# Patient Record
Sex: FEMALE | Race: WHITE | NOT HISPANIC OR LATINO | Employment: OTHER | URBAN - NONMETROPOLITAN AREA
[De-identification: names, ages, dates, MRNs, and addresses within clinical notes are randomized per-mention and may not be internally consistent; named-entity substitution may affect disease eponyms.]

---

## 2017-01-16 ENCOUNTER — APPOINTMENT (OUTPATIENT)
Dept: GENERAL RADIOLOGY | Facility: HOSPITAL | Age: 47
End: 2017-01-16
Attending: FAMILY MEDICINE

## 2017-01-16 PROCEDURE — 71020 HC CHEST PA AND LATERAL: CPT

## 2017-05-17 ENCOUNTER — HOSPITAL ENCOUNTER (INPATIENT)
Age: 47
LOS: 10 days | Discharge: HOME | DRG: 682 | End: 2017-05-27
Attending: EMERGENCY MEDICINE | Admitting: HOSPITALIST
Payer: MEDICARE

## 2017-05-17 ENCOUNTER — APPOINTMENT (OUTPATIENT)
Dept: GENERAL RADIOLOGY | Age: 47
DRG: 682 | End: 2017-05-17
Payer: MEDICARE

## 2017-05-17 DIAGNOSIS — E16.2 HYPOGLYCEMIA: ICD-10-CM

## 2017-05-17 DIAGNOSIS — T68.XXXA HYPOTHERMIA, INITIAL ENCOUNTER: ICD-10-CM

## 2017-05-17 DIAGNOSIS — N18.6 ESRD (END STAGE RENAL DISEASE) (HCC): Primary | ICD-10-CM

## 2017-05-17 PROBLEM — N17.0 ACUTE RENAL FAILURE WITH TUBULAR NECROSIS (HCC): Status: ACTIVE | Noted: 2017-05-17

## 2017-05-17 LAB
ALBUMIN SERPL-MCNC: 3.9 G/DL (ref 3.5–5.2)
ALP BLD-CCNC: 99 U/L (ref 35–104)
ALT SERPL-CCNC: 27 U/L (ref 5–33)
ANION GAP SERPL CALCULATED.3IONS-SCNC: 17 MMOL/L (ref 7–19)
AST SERPL-CCNC: 36 U/L (ref 5–32)
BACTERIA: NEGATIVE /HPF
BASOPHILS ABSOLUTE: 0.1 K/UL (ref 0–0.2)
BASOPHILS RELATIVE PERCENT: 1.6 % (ref 0–1)
BILIRUB SERPL-MCNC: <0.2 MG/DL (ref 0.2–1.2)
BILIRUBIN URINE: NEGATIVE
BLOOD, URINE: NEGATIVE
BUN BLDV-MCNC: 75 MG/DL (ref 6–20)
CALCIUM SERPL-MCNC: 8.8 MG/DL (ref 8.6–10)
CHLORIDE BLD-SCNC: 103 MMOL/L (ref 98–111)
CHP ED QC CHECK: YES
CLARITY: CLEAR
CO2: 19 MMOL/L (ref 22–29)
COLOR: YELLOW
CREAT SERPL-MCNC: 8.5 MG/DL (ref 0.5–0.9)
EOSINOPHILS ABSOLUTE: 0.1 K/UL (ref 0–0.6)
EOSINOPHILS RELATIVE PERCENT: 3.1 % (ref 0–5)
EPITHELIAL CELLS, UA: 1 /HPF (ref 0–5)
GFR NON-AFRICAN AMERICAN: 5
GLUCOSE BLD-MCNC: 109 MG/DL (ref 70–99)
GLUCOSE BLD-MCNC: 115 MG/DL (ref 74–109)
GLUCOSE BLD-MCNC: 117 MG/DL (ref 70–99)
GLUCOSE BLD-MCNC: 136 MG/DL (ref 70–99)
GLUCOSE BLD-MCNC: 152 MG/DL (ref 70–99)
GLUCOSE BLD-MCNC: 59 MG/DL
GLUCOSE BLD-MCNC: 59 MG/DL (ref 70–99)
GLUCOSE URINE: NEGATIVE MG/DL
HCT VFR BLD CALC: 30.1 % (ref 37–47)
HEMOGLOBIN: 9.3 G/DL (ref 12–16)
HYALINE CASTS: 1 /HPF (ref 0–8)
KETONES, URINE: NEGATIVE MG/DL
LEUKOCYTE ESTERASE, URINE: NEGATIVE
LYMPHOCYTES ABSOLUTE: 1.1 K/UL (ref 1.1–4.5)
LYMPHOCYTES RELATIVE PERCENT: 24.5 % (ref 20–40)
MCH RBC QN AUTO: 28.5 PG (ref 27–31)
MCHC RBC AUTO-ENTMCNC: 30.9 G/DL (ref 33–37)
MCV RBC AUTO: 92.3 FL (ref 81–99)
MONOCYTES ABSOLUTE: 0.3 K/UL (ref 0–0.9)
MONOCYTES RELATIVE PERCENT: 6.1 % (ref 0–10)
NEUTROPHILS ABSOLUTE: 2.9 K/UL (ref 1.5–7.5)
NEUTROPHILS RELATIVE PERCENT: 64.5 % (ref 50–65)
NITRITE, URINE: NEGATIVE
PDW BLD-RTO: 13.6 % (ref 11.5–14.5)
PERFORMED ON: ABNORMAL
PERFORMED ON: NORMAL
PH UA: 5.5
PLATELET # BLD: 171 K/UL (ref 130–400)
PMV BLD AUTO: 9.4 FL (ref 7.4–10.4)
POC TROPONIN I: 0.01 NG/ML (ref 0–0.08)
POTASSIUM SERPL-SCNC: 5.2 MMOL/L (ref 3.5–5)
PROTEIN UA: 300 MG/DL
RBC # BLD: 3.26 M/UL (ref 4.2–5.4)
RBC UA: 6 /HPF (ref 0–4)
SODIUM BLD-SCNC: 139 MMOL/L (ref 136–145)
SPECIFIC GRAVITY UA: 1.01
TOTAL PROTEIN: 6.9 G/DL (ref 6.6–8.7)
UROBILINOGEN, URINE: 0.2 E.U./DL
WBC # BLD: 4.5 K/UL (ref 4.8–10.8)
WBC UA: 3 /HPF (ref 0–5)

## 2017-05-17 PROCEDURE — 2580000003 HC RX 258: Performed by: HOSPITALIST

## 2017-05-17 PROCEDURE — 2500000003 HC RX 250 WO HCPCS: Performed by: HOSPITALIST

## 2017-05-17 PROCEDURE — 99285 EMERGENCY DEPT VISIT HI MDM: CPT

## 2017-05-17 PROCEDURE — 6370000000 HC RX 637 (ALT 250 FOR IP): Performed by: EMERGENCY MEDICINE

## 2017-05-17 PROCEDURE — 2000000000 HC ICU R&B

## 2017-05-17 PROCEDURE — 85025 COMPLETE CBC W/AUTO DIFF WBC: CPT

## 2017-05-17 PROCEDURE — 36415 COLL VENOUS BLD VENIPUNCTURE: CPT

## 2017-05-17 PROCEDURE — 81001 URINALYSIS AUTO W/SCOPE: CPT

## 2017-05-17 PROCEDURE — 6360000002 HC RX W HCPCS: Performed by: HOSPITALIST

## 2017-05-17 PROCEDURE — 2700000000 HC OXYGEN THERAPY PER DAY

## 2017-05-17 PROCEDURE — 82948 REAGENT STRIP/BLOOD GLUCOSE: CPT

## 2017-05-17 PROCEDURE — 80053 COMPREHEN METABOLIC PANEL: CPT

## 2017-05-17 PROCEDURE — 99223 1ST HOSP IP/OBS HIGH 75: CPT | Performed by: HOSPITALIST

## 2017-05-17 PROCEDURE — 99284 EMERGENCY DEPT VISIT MOD MDM: CPT | Performed by: EMERGENCY MEDICINE

## 2017-05-17 PROCEDURE — 2580000003 HC RX 258: Performed by: EMERGENCY MEDICINE

## 2017-05-17 PROCEDURE — 93005 ELECTROCARDIOGRAM TRACING: CPT

## 2017-05-17 PROCEDURE — 6370000000 HC RX 637 (ALT 250 FOR IP): Performed by: HOSPITALIST

## 2017-05-17 PROCEDURE — 84484 ASSAY OF TROPONIN QUANT: CPT

## 2017-05-17 PROCEDURE — 71010 XR CHEST PORTABLE: CPT

## 2017-05-17 PROCEDURE — 87070 CULTURE OTHR SPECIMN AEROBIC: CPT

## 2017-05-17 RX ORDER — DEXTROSE MONOHYDRATE 25 G/50ML
25 INJECTION, SOLUTION INTRAVENOUS ONCE
Status: COMPLETED | OUTPATIENT
Start: 2017-05-17 | End: 2017-05-17

## 2017-05-17 RX ORDER — NIFEDIPINE 60 MG/1
60 TABLET, EXTENDED RELEASE ORAL DAILY
Status: DISCONTINUED | OUTPATIENT
Start: 2017-05-17 | End: 2017-05-21

## 2017-05-17 RX ORDER — ATENOLOL 50 MG/1
100 TABLET ORAL 2 TIMES DAILY
Status: DISCONTINUED | OUTPATIENT
Start: 2017-05-17 | End: 2017-05-27 | Stop reason: HOSPADM

## 2017-05-17 RX ORDER — LOSARTAN POTASSIUM 50 MG/1
50 TABLET ORAL 2 TIMES DAILY
Status: DISCONTINUED | OUTPATIENT
Start: 2017-05-17 | End: 2017-05-19

## 2017-05-17 RX ORDER — ENALAPRILAT 2.5 MG/2ML
1.25 INJECTION INTRAVENOUS EVERY 4 HOURS PRN
Status: DISCONTINUED | OUTPATIENT
Start: 2017-05-17 | End: 2017-05-19

## 2017-05-17 RX ORDER — ONDANSETRON 2 MG/ML
4 INJECTION INTRAMUSCULAR; INTRAVENOUS EVERY 4 HOURS PRN
Status: DISCONTINUED | OUTPATIENT
Start: 2017-05-17 | End: 2017-05-27 | Stop reason: HOSPADM

## 2017-05-17 RX ORDER — SIMVASTATIN 40 MG
40 TABLET ORAL DAILY
Status: DISCONTINUED | OUTPATIENT
Start: 2017-05-17 | End: 2017-05-27 | Stop reason: HOSPADM

## 2017-05-17 RX ORDER — LEVOTHYROXINE SODIUM 137 UG/1
137 TABLET ORAL DAILY
Status: DISCONTINUED | OUTPATIENT
Start: 2017-05-17 | End: 2017-05-27 | Stop reason: HOSPADM

## 2017-05-17 RX ORDER — DULOXETIN HYDROCHLORIDE 60 MG/1
60 CAPSULE, DELAYED RELEASE ORAL DAILY
Status: DISCONTINUED | OUTPATIENT
Start: 2017-05-17 | End: 2017-05-27 | Stop reason: HOSPADM

## 2017-05-17 RX ORDER — METOCLOPRAMIDE 5 MG/1
5 TABLET ORAL 4 TIMES DAILY
Status: DISCONTINUED | OUTPATIENT
Start: 2017-05-17 | End: 2017-05-27 | Stop reason: HOSPADM

## 2017-05-17 RX ORDER — HYDRALAZINE HYDROCHLORIDE 20 MG/ML
10 INJECTION INTRAMUSCULAR; INTRAVENOUS EVERY 4 HOURS PRN
Status: DISCONTINUED | OUTPATIENT
Start: 2017-05-17 | End: 2017-05-18

## 2017-05-17 RX ORDER — DEXTROSE MONOHYDRATE 25 G/50ML
12.5 INJECTION, SOLUTION INTRAVENOUS PRN
Status: DISCONTINUED | OUTPATIENT
Start: 2017-05-17 | End: 2017-05-27 | Stop reason: HOSPADM

## 2017-05-17 RX ORDER — ROPINIROLE 2 MG/1
2 TABLET, FILM COATED ORAL NIGHTLY
Status: DISCONTINUED | OUTPATIENT
Start: 2017-05-17 | End: 2017-05-27 | Stop reason: HOSPADM

## 2017-05-17 RX ORDER — FAMOTIDINE 20 MG/1
20 TABLET, FILM COATED ORAL DAILY
Status: DISCONTINUED | OUTPATIENT
Start: 2017-05-17 | End: 2017-05-27 | Stop reason: HOSPADM

## 2017-05-17 RX ORDER — HYDRALAZINE HYDROCHLORIDE 50 MG/1
50 TABLET, FILM COATED ORAL 3 TIMES DAILY
Status: DISCONTINUED | OUTPATIENT
Start: 2017-05-17 | End: 2017-05-20

## 2017-05-17 RX ORDER — ENALAPRILAT 2.5 MG/2ML
1.25 INJECTION INTRAVENOUS ONCE
Status: COMPLETED | OUTPATIENT
Start: 2017-05-17 | End: 2017-05-17

## 2017-05-17 RX ADMIN — SODIUM BICARBONATE: 84 INJECTION INTRAVENOUS at 19:17

## 2017-05-17 RX ADMIN — DULOXETINE HYDROCHLORIDE 60 MG: 60 CAPSULE, DELAYED RELEASE ORAL at 19:14

## 2017-05-17 RX ADMIN — ENALAPRILAT 1.25 MG: 1.25 INJECTION INTRAVENOUS at 17:11

## 2017-05-17 RX ADMIN — HYDRALAZINE HYDROCHLORIDE 50 MG: 50 TABLET, FILM COATED ORAL at 19:42

## 2017-05-17 RX ADMIN — ROPINIROLE HYDROCHLORIDE 2 MG: 2 TABLET, FILM COATED ORAL at 19:41

## 2017-05-17 RX ADMIN — LEVOTHYROXINE SODIUM 137 MCG: 137 TABLET ORAL at 19:14

## 2017-05-17 RX ADMIN — ENALAPRILAT 1.25 MG: 1.25 INJECTION INTRAVENOUS at 18:18

## 2017-05-17 RX ADMIN — ATENOLOL 100 MG: 50 TABLET ORAL at 19:42

## 2017-05-17 RX ADMIN — HYDRALAZINE HYDROCHLORIDE 10 MG: 20 INJECTION INTRAMUSCULAR; INTRAVENOUS at 17:35

## 2017-05-17 RX ADMIN — HYDRALAZINE HYDROCHLORIDE 10 MG: 20 INJECTION INTRAMUSCULAR; INTRAVENOUS at 21:42

## 2017-05-17 RX ADMIN — SIMVASTATIN 40 MG: 40 TABLET, FILM COATED ORAL at 19:14

## 2017-05-17 RX ADMIN — FAMOTIDINE 20 MG: 20 TABLET ORAL at 19:14

## 2017-05-17 RX ADMIN — DEXTROSE MONOHYDRATE 25 G: 25 INJECTION, SOLUTION INTRAVENOUS at 13:54

## 2017-05-17 RX ADMIN — LOSARTAN POTASSIUM 50 MG: 50 TABLET ORAL at 19:42

## 2017-05-17 RX ADMIN — ENALAPRILAT 1.25 MG: 1.25 INJECTION INTRAVENOUS at 23:05

## 2017-05-17 RX ADMIN — Medication 30 ML: at 12:27

## 2017-05-17 RX ADMIN — METOCLOPRAMIDE 5 MG: 5 TABLET ORAL at 19:42

## 2017-05-17 ASSESSMENT — PAIN SCALES - GENERAL
PAINLEVEL_OUTOF10: 0
PAINLEVEL_OUTOF10: 0

## 2017-05-17 ASSESSMENT — ENCOUNTER SYMPTOMS
SHORTNESS OF BREATH: 0
VOMITING: 0

## 2017-05-18 LAB
ALBUMIN SERPL-MCNC: 3 G/DL (ref 3.5–5.2)
ALP BLD-CCNC: 80 U/L (ref 35–104)
ALT SERPL-CCNC: 19 U/L (ref 5–33)
ANION GAP SERPL CALCULATED.3IONS-SCNC: 20 MMOL/L (ref 7–19)
AST SERPL-CCNC: 19 U/L (ref 5–32)
BASOPHILS ABSOLUTE: 0.1 K/UL (ref 0–0.2)
BASOPHILS RELATIVE PERCENT: 1 % (ref 0–1)
BILIRUB SERPL-MCNC: <0.2 MG/DL (ref 0.2–1.2)
BUN BLDV-MCNC: 71 MG/DL (ref 6–20)
CALCIUM SERPL-MCNC: 8.2 MG/DL (ref 8.6–10)
CHLORIDE BLD-SCNC: 101 MMOL/L (ref 98–111)
CHOLESTEROL, TOTAL: 160 MG/DL (ref 160–199)
CO2: 15 MMOL/L (ref 22–29)
CREAT SERPL-MCNC: 8 MG/DL (ref 0.5–0.9)
EOSINOPHILS ABSOLUTE: 0 K/UL (ref 0–0.6)
EOSINOPHILS RELATIVE PERCENT: 0.7 % (ref 0–5)
FERRITIN: 131.9 NG/ML (ref 13–150)
FOLATE: 18.6 NG/ML (ref 4.8–37.3)
GFR NON-AFRICAN AMERICAN: 5
GLUCOSE BLD-MCNC: 165 MG/DL (ref 74–109)
GLUCOSE BLD-MCNC: 171 MG/DL (ref 70–99)
GLUCOSE BLD-MCNC: 175 MG/DL (ref 70–99)
GLUCOSE BLD-MCNC: 223 MG/DL (ref 70–99)
GLUCOSE BLD-MCNC: 294 MG/DL (ref 70–99)
HBA1C MFR BLD: 8.8 %
HCT VFR BLD CALC: 26.4 % (ref 37–47)
HDLC SERPL-MCNC: 48 MG/DL (ref 65–121)
HEMOGLOBIN: 8.4 G/DL (ref 12–16)
IRON SATURATION: 12 % (ref 14–50)
IRON: 24 UG/DL (ref 37–145)
LDL CHOLESTEROL CALCULATED: 94 MG/DL
LYMPHOCYTES ABSOLUTE: 1.1 K/UL (ref 1.1–4.5)
LYMPHOCYTES RELATIVE PERCENT: 19.7 % (ref 20–40)
MAGNESIUM: 2.4 MG/DL (ref 1.6–2.6)
MCH RBC QN AUTO: 28.5 PG (ref 27–31)
MCHC RBC AUTO-ENTMCNC: 31.8 G/DL (ref 33–37)
MCV RBC AUTO: 89.5 FL (ref 81–99)
MONOCYTES ABSOLUTE: 0.4 K/UL (ref 0–0.9)
MONOCYTES RELATIVE PERCENT: 7.6 % (ref 0–10)
NEUTROPHILS ABSOLUTE: 4.1 K/UL (ref 1.5–7.5)
NEUTROPHILS RELATIVE PERCENT: 70.7 % (ref 50–65)
PARATHYROID HORMONE INTACT: 141.5 PG/ML (ref 15–65)
PDW BLD-RTO: 13.6 % (ref 11.5–14.5)
PERFORMED ON: ABNORMAL
PHOSPHORUS: 6.8 MG/DL (ref 2.5–4.5)
PLATELET # BLD: 165 K/UL (ref 130–400)
PMV BLD AUTO: 9.5 FL (ref 7.4–10.4)
POTASSIUM SERPL-SCNC: 5.2 MMOL/L (ref 3.5–5)
RBC # BLD: 2.95 M/UL (ref 4.2–5.4)
RETICULOCYTE ABSOLUTE COUNT: 0.04 M/UL (ref 0.03–0.12)
RETICULOCYTE COUNT PCT: 1.19 % (ref 0.5–1.5)
SODIUM BLD-SCNC: 136 MMOL/L (ref 136–145)
TOTAL IRON BINDING CAPACITY: 195 UG/DL (ref 250–400)
TOTAL PROTEIN: 6 G/DL (ref 6.6–8.7)
TRIGL SERPL-MCNC: 91 MG/DL (ref 150–199)
TSH SERPL DL<=0.05 MIU/L-ACNC: 7.24 UIU/ML (ref 0.27–4.2)
URIC ACID, SERUM: 8.5 MG/DL (ref 2.4–5.7)
VITAMIN B-12: 355 PG/ML (ref 211–946)
VITAMIN D 25-HYDROXY: 9.7 NG/ML
WBC # BLD: 5.8 K/UL (ref 4.8–10.8)

## 2017-05-18 PROCEDURE — 2580000003 HC RX 258: Performed by: HOSPITALIST

## 2017-05-18 PROCEDURE — 83970 ASSAY OF PARATHORMONE: CPT

## 2017-05-18 PROCEDURE — 82948 REAGENT STRIP/BLOOD GLUCOSE: CPT

## 2017-05-18 PROCEDURE — 2500000003 HC RX 250 WO HCPCS: Performed by: HOSPITALIST

## 2017-05-18 PROCEDURE — 36415 COLL VENOUS BLD VENIPUNCTURE: CPT

## 2017-05-18 PROCEDURE — 82607 VITAMIN B-12: CPT

## 2017-05-18 PROCEDURE — 80061 LIPID PANEL: CPT

## 2017-05-18 PROCEDURE — 2000000000 HC ICU R&B

## 2017-05-18 PROCEDURE — 6370000000 HC RX 637 (ALT 250 FOR IP): Performed by: HOSPITALIST

## 2017-05-18 PROCEDURE — 83540 ASSAY OF IRON: CPT

## 2017-05-18 PROCEDURE — 80074 ACUTE HEPATITIS PANEL: CPT

## 2017-05-18 PROCEDURE — 8010000000 HC HEMODIALYSIS ACUTE INPT

## 2017-05-18 PROCEDURE — 6360000002 HC RX W HCPCS: Performed by: HOSPITALIST

## 2017-05-18 PROCEDURE — 83036 HEMOGLOBIN GLYCOSYLATED A1C: CPT

## 2017-05-18 PROCEDURE — 84100 ASSAY OF PHOSPHORUS: CPT

## 2017-05-18 PROCEDURE — 2580000003 HC RX 258: Performed by: INTERNAL MEDICINE

## 2017-05-18 PROCEDURE — 83735 ASSAY OF MAGNESIUM: CPT

## 2017-05-18 PROCEDURE — 99233 SBSQ HOSP IP/OBS HIGH 50: CPT | Performed by: HOSPITALIST

## 2017-05-18 PROCEDURE — 85045 AUTOMATED RETICULOCYTE COUNT: CPT

## 2017-05-18 PROCEDURE — 86706 HEP B SURFACE ANTIBODY: CPT

## 2017-05-18 PROCEDURE — 80053 COMPREHEN METABOLIC PANEL: CPT

## 2017-05-18 PROCEDURE — 82746 ASSAY OF FOLIC ACID SERUM: CPT

## 2017-05-18 PROCEDURE — 85025 COMPLETE CBC W/AUTO DIFF WBC: CPT

## 2017-05-18 PROCEDURE — 5A1D60Z PERFORMANCE OF URINARY FILTRATION, MULTIPLE: ICD-10-PCS | Performed by: INTERNAL MEDICINE

## 2017-05-18 PROCEDURE — 82728 ASSAY OF FERRITIN: CPT

## 2017-05-18 PROCEDURE — 2500000003 HC RX 250 WO HCPCS: Performed by: INTERNAL MEDICINE

## 2017-05-18 PROCEDURE — 82306 VITAMIN D 25 HYDROXY: CPT

## 2017-05-18 PROCEDURE — 83550 IRON BINDING TEST: CPT

## 2017-05-18 PROCEDURE — 84550 ASSAY OF BLOOD/URIC ACID: CPT

## 2017-05-18 PROCEDURE — 2700000000 HC OXYGEN THERAPY PER DAY

## 2017-05-18 PROCEDURE — 84443 ASSAY THYROID STIM HORMONE: CPT

## 2017-05-18 RX ORDER — LIDOCAINE 40 MG/G
CREAM TOPICAL PRN
Status: DISCONTINUED | OUTPATIENT
Start: 2017-05-18 | End: 2017-05-27 | Stop reason: HOSPADM

## 2017-05-18 RX ORDER — HYDRALAZINE HYDROCHLORIDE 20 MG/ML
10 INJECTION INTRAMUSCULAR; INTRAVENOUS ONCE
Status: COMPLETED | OUTPATIENT
Start: 2017-05-18 | End: 2017-05-18

## 2017-05-18 RX ORDER — ERGOCALCIFEROL 1.25 MG/1
50000 CAPSULE ORAL WEEKLY
Status: DISCONTINUED | OUTPATIENT
Start: 2017-05-18 | End: 2017-05-19

## 2017-05-18 RX ORDER — HYDRALAZINE HYDROCHLORIDE 20 MG/ML
20 INJECTION INTRAMUSCULAR; INTRAVENOUS EVERY 4 HOURS PRN
Status: DISCONTINUED | OUTPATIENT
Start: 2017-05-18 | End: 2017-05-27 | Stop reason: HOSPADM

## 2017-05-18 RX ORDER — LABETALOL HYDROCHLORIDE 5 MG/ML
20 INJECTION, SOLUTION INTRAVENOUS EVERY 4 HOURS PRN
Status: DISCONTINUED | OUTPATIENT
Start: 2017-05-18 | End: 2017-05-27 | Stop reason: HOSPADM

## 2017-05-18 RX ADMIN — ATENOLOL 100 MG: 50 TABLET ORAL at 20:57

## 2017-05-18 RX ADMIN — HYDRALAZINE HYDROCHLORIDE 10 MG: 20 INJECTION INTRAMUSCULAR; INTRAVENOUS at 05:35

## 2017-05-18 RX ADMIN — ENALAPRILAT 1.25 MG: 1.25 INJECTION INTRAVENOUS at 11:37

## 2017-05-18 RX ADMIN — ONDANSETRON 4 MG: 2 INJECTION INTRAMUSCULAR; INTRAVENOUS at 07:38

## 2017-05-18 RX ADMIN — FAMOTIDINE 20 MG: 20 TABLET ORAL at 08:46

## 2017-05-18 RX ADMIN — HYDRALAZINE HYDROCHLORIDE 50 MG: 50 TABLET, FILM COATED ORAL at 20:57

## 2017-05-18 RX ADMIN — HYDRALAZINE HYDROCHLORIDE 10 MG: 20 INJECTION INTRAMUSCULAR; INTRAVENOUS at 01:38

## 2017-05-18 RX ADMIN — METOCLOPRAMIDE 5 MG: 5 TABLET ORAL at 08:46

## 2017-05-18 RX ADMIN — LOSARTAN POTASSIUM 50 MG: 50 TABLET ORAL at 08:46

## 2017-05-18 RX ADMIN — ATENOLOL 100 MG: 50 TABLET ORAL at 08:46

## 2017-05-18 RX ADMIN — ROPINIROLE HYDROCHLORIDE 2 MG: 2 TABLET, FILM COATED ORAL at 20:57

## 2017-05-18 RX ADMIN — METOCLOPRAMIDE 5 MG: 5 TABLET ORAL at 20:57

## 2017-05-18 RX ADMIN — HYDRALAZINE HYDROCHLORIDE 20 MG: 20 INJECTION INTRAMUSCULAR; INTRAVENOUS at 11:53

## 2017-05-18 RX ADMIN — INSULIN LISPRO 3 UNITS: 100 INJECTION, SOLUTION INTRAVENOUS; SUBCUTANEOUS at 20:58

## 2017-05-18 RX ADMIN — HYDRALAZINE HYDROCHLORIDE 10 MG: 20 INJECTION INTRAMUSCULAR; INTRAVENOUS at 09:41

## 2017-05-18 RX ADMIN — DEXTROSE MONOHYDRATE 5 MG/HR: 50 INJECTION, SOLUTION INTRAVENOUS at 12:30

## 2017-05-18 RX ADMIN — SODIUM BICARBONATE: 84 INJECTION INTRAVENOUS at 17:12

## 2017-05-18 RX ADMIN — DULOXETINE HYDROCHLORIDE 60 MG: 60 CAPSULE, DELAYED RELEASE ORAL at 08:46

## 2017-05-18 RX ADMIN — SODIUM BICARBONATE: 84 INJECTION INTRAVENOUS at 05:35

## 2017-05-18 RX ADMIN — LABETALOL HYDROCHLORIDE 20 MG: 5 INJECTION, SOLUTION INTRAVENOUS at 10:45

## 2017-05-18 RX ADMIN — ENALAPRILAT 1.25 MG: 1.25 INJECTION INTRAVENOUS at 03:05

## 2017-05-18 RX ADMIN — LABETALOL HYDROCHLORIDE 20 MG: 5 INJECTION, SOLUTION INTRAVENOUS at 22:39

## 2017-05-18 RX ADMIN — INSULIN LISPRO 5 UNITS: 100 INJECTION, SOLUTION INTRAVENOUS; SUBCUTANEOUS at 09:28

## 2017-05-18 RX ADMIN — ONDANSETRON 4 MG: 2 INJECTION INTRAMUSCULAR; INTRAVENOUS at 14:03

## 2017-05-18 RX ADMIN — SIMVASTATIN 40 MG: 40 TABLET, FILM COATED ORAL at 08:46

## 2017-05-18 RX ADMIN — HYDRALAZINE HYDROCHLORIDE 10 MG: 20 INJECTION INTRAMUSCULAR; INTRAVENOUS at 09:59

## 2017-05-18 RX ADMIN — ENALAPRILAT 1.25 MG: 1.25 INJECTION INTRAVENOUS at 07:26

## 2017-05-18 RX ADMIN — HYDRALAZINE HYDROCHLORIDE 50 MG: 50 TABLET, FILM COATED ORAL at 08:45

## 2017-05-18 RX ADMIN — LOSARTAN POTASSIUM 50 MG: 50 TABLET ORAL at 20:57

## 2017-05-18 ASSESSMENT — PAIN SCALES - GENERAL
PAINLEVEL_OUTOF10: 0

## 2017-05-19 LAB
ANION GAP SERPL CALCULATED.3IONS-SCNC: 23 MMOL/L (ref 7–19)
BACTERIA: NEGATIVE /HPF
BILIRUBIN URINE: NEGATIVE
BLOOD, URINE: NEGATIVE
BUN BLDV-MCNC: 50 MG/DL (ref 6–20)
CALCIUM SERPL-MCNC: 7.8 MG/DL (ref 8.6–10)
CHLORIDE BLD-SCNC: 94 MMOL/L (ref 98–111)
CLARITY: ABNORMAL
CO2: 18 MMOL/L (ref 22–29)
COLOR: YELLOW
CREAT SERPL-MCNC: 7.1 MG/DL (ref 0.5–0.9)
EPITHELIAL CELLS, UA: 4 /HPF (ref 0–5)
GFR NON-AFRICAN AMERICAN: 6
GLUCOSE BLD-MCNC: 144 MG/DL (ref 70–99)
GLUCOSE BLD-MCNC: 165 MG/DL (ref 70–99)
GLUCOSE BLD-MCNC: 233 MG/DL (ref 74–109)
GLUCOSE BLD-MCNC: 295 MG/DL (ref 70–99)
GLUCOSE BLD-MCNC: 298 MG/DL (ref 70–99)
GLUCOSE URINE: 250 MG/DL
HAV IGM SER IA-ACNC: NORMAL
HBV SURFACE AB TITR SER: NORMAL MIU/ML
HCT VFR BLD CALC: 24.7 % (ref 37–47)
HEMOGLOBIN: 7.7 G/DL (ref 12–16)
HEPATITIS B CORE IGM ANTIBODY: NORMAL
HEPATITIS B SURFACE ANTIGEN INTERPRETATION: NORMAL
HEPATITIS C ANTIBODY INTERPRETATION: NORMAL
HYALINE CASTS: 11 /HPF (ref 0–8)
KETONES, URINE: ABNORMAL MG/DL
LEUKOCYTE ESTERASE, URINE: ABNORMAL
MCH RBC QN AUTO: 28.1 PG (ref 27–31)
MCHC RBC AUTO-ENTMCNC: 31.2 G/DL (ref 33–37)
MCV RBC AUTO: 90.1 FL (ref 81–99)
MRSA CULTURE ONLY: NORMAL
NITRITE, URINE: NEGATIVE
PDW BLD-RTO: 13.5 % (ref 11.5–14.5)
PERFORMED ON: ABNORMAL
PH UA: 5.5
PLATELET # BLD: 148 K/UL (ref 130–400)
PMV BLD AUTO: 9.3 FL (ref 7.4–10.4)
POTASSIUM SERPL-SCNC: 4.4 MMOL/L (ref 3.5–5)
PROTEIN UA: 300 MG/DL
RBC # BLD: 2.74 M/UL (ref 4.2–5.4)
RBC UA: 6 /HPF (ref 0–4)
SODIUM BLD-SCNC: 135 MMOL/L (ref 136–145)
SPECIFIC GRAVITY UA: 1.02
T3 FREE: 1.2 PG/ML (ref 2–4.4)
T4 FREE: 1.3 NG/ML (ref 0.9–1.7)
TSH SERPL DL<=0.05 MIU/L-ACNC: 13.1 UIU/ML (ref 0.27–4.2)
UROBILINOGEN, URINE: 0.2 E.U./DL
WBC # BLD: 6.4 K/UL (ref 4.8–10.8)
WBC UA: 55 /HPF (ref 0–5)

## 2017-05-19 PROCEDURE — 2500000003 HC RX 250 WO HCPCS: Performed by: HOSPITALIST

## 2017-05-19 PROCEDURE — 84439 ASSAY OF FREE THYROXINE: CPT

## 2017-05-19 PROCEDURE — 6370000000 HC RX 637 (ALT 250 FOR IP): Performed by: INTERNAL MEDICINE

## 2017-05-19 PROCEDURE — 6360000002 HC RX W HCPCS: Performed by: HOSPITALIST

## 2017-05-19 PROCEDURE — 81001 URINALYSIS AUTO W/SCOPE: CPT

## 2017-05-19 PROCEDURE — 1210000000 HC MED SURG R&B

## 2017-05-19 PROCEDURE — 84481 FREE ASSAY (FT-3): CPT

## 2017-05-19 PROCEDURE — 6370000000 HC RX 637 (ALT 250 FOR IP): Performed by: HOSPITALIST

## 2017-05-19 PROCEDURE — 99999 PR OFFICE/OUTPT VISIT,PROCEDURE ONLY: CPT | Performed by: INTERNAL MEDICINE

## 2017-05-19 PROCEDURE — 2580000003 HC RX 258: Performed by: HOSPITALIST

## 2017-05-19 PROCEDURE — 80048 BASIC METABOLIC PNL TOTAL CA: CPT

## 2017-05-19 PROCEDURE — 85027 COMPLETE CBC AUTOMATED: CPT

## 2017-05-19 PROCEDURE — 82948 REAGENT STRIP/BLOOD GLUCOSE: CPT

## 2017-05-19 PROCEDURE — 87040 BLOOD CULTURE FOR BACTERIA: CPT

## 2017-05-19 PROCEDURE — 8010000000 HC HEMODIALYSIS ACUTE INPT

## 2017-05-19 PROCEDURE — 87086 URINE CULTURE/COLONY COUNT: CPT

## 2017-05-19 PROCEDURE — 36415 COLL VENOUS BLD VENIPUNCTURE: CPT

## 2017-05-19 PROCEDURE — 84443 ASSAY THYROID STIM HORMONE: CPT

## 2017-05-19 RX ORDER — ACETAMINOPHEN 325 MG/1
650 TABLET ORAL EVERY 4 HOURS PRN
Status: DISCONTINUED | OUTPATIENT
Start: 2017-05-19 | End: 2017-05-27 | Stop reason: HOSPADM

## 2017-05-19 RX ORDER — HYDRALAZINE HYDROCHLORIDE 25 MG/1
25 TABLET, FILM COATED ORAL EVERY 8 HOURS SCHEDULED
Status: DISCONTINUED | OUTPATIENT
Start: 2017-05-19 | End: 2017-05-20

## 2017-05-19 RX ORDER — ISOSORBIDE MONONITRATE 30 MG/1
30 TABLET, EXTENDED RELEASE ORAL DAILY
Status: DISCONTINUED | OUTPATIENT
Start: 2017-05-19 | End: 2017-05-20

## 2017-05-19 RX ADMIN — HYDRALAZINE HYDROCHLORIDE 25 MG: 50 TABLET, FILM COATED ORAL at 23:58

## 2017-05-19 RX ADMIN — LABETALOL HYDROCHLORIDE 20 MG: 5 INJECTION, SOLUTION INTRAVENOUS at 04:24

## 2017-05-19 RX ADMIN — HYDRALAZINE HYDROCHLORIDE 50 MG: 50 TABLET, FILM COATED ORAL at 08:19

## 2017-05-19 RX ADMIN — LEVOTHYROXINE SODIUM 137 MCG: 137 TABLET ORAL at 05:44

## 2017-05-19 RX ADMIN — METOCLOPRAMIDE 5 MG: 5 TABLET ORAL at 16:59

## 2017-05-19 RX ADMIN — HYDRALAZINE HYDROCHLORIDE 50 MG: 50 TABLET, FILM COATED ORAL at 14:20

## 2017-05-19 RX ADMIN — ROPINIROLE HYDROCHLORIDE 2 MG: 2 TABLET, FILM COATED ORAL at 22:01

## 2017-05-19 RX ADMIN — INSULIN LISPRO 2 UNITS: 100 INJECTION, SOLUTION INTRAVENOUS; SUBCUTANEOUS at 17:03

## 2017-05-19 RX ADMIN — ISOSORBIDE MONONITRATE 30 MG: 30 TABLET, EXTENDED RELEASE ORAL at 11:37

## 2017-05-19 RX ADMIN — SIMVASTATIN 40 MG: 40 TABLET, FILM COATED ORAL at 08:19

## 2017-05-19 RX ADMIN — SODIUM BICARBONATE: 84 INJECTION INTRAVENOUS at 05:44

## 2017-05-19 RX ADMIN — HYDRALAZINE HYDROCHLORIDE 50 MG: 50 TABLET, FILM COATED ORAL at 22:02

## 2017-05-19 RX ADMIN — ACETAMINOPHEN 650 MG: 325 TABLET, FILM COATED ORAL at 00:44

## 2017-05-19 RX ADMIN — ENALAPRILAT 1.25 MG: 1.25 INJECTION INTRAVENOUS at 09:54

## 2017-05-19 RX ADMIN — INSULIN LISPRO 1 UNITS: 100 INJECTION, SOLUTION INTRAVENOUS; SUBCUTANEOUS at 22:05

## 2017-05-19 RX ADMIN — LABETALOL HYDROCHLORIDE 20 MG: 5 INJECTION, SOLUTION INTRAVENOUS at 08:20

## 2017-05-19 RX ADMIN — NIFEDIPINE 60 MG: 60 TABLET, FILM COATED, EXTENDED RELEASE ORAL at 09:30

## 2017-05-19 RX ADMIN — LOSARTAN POTASSIUM 50 MG: 50 TABLET ORAL at 08:25

## 2017-05-19 RX ADMIN — FAMOTIDINE 20 MG: 20 TABLET ORAL at 08:19

## 2017-05-19 RX ADMIN — ATENOLOL 100 MG: 50 TABLET ORAL at 08:19

## 2017-05-19 RX ADMIN — ATENOLOL 100 MG: 50 TABLET ORAL at 22:01

## 2017-05-19 RX ADMIN — METOCLOPRAMIDE 5 MG: 5 TABLET ORAL at 08:19

## 2017-05-19 RX ADMIN — METOCLOPRAMIDE 5 MG: 5 TABLET ORAL at 22:01

## 2017-05-19 RX ADMIN — SODIUM BICARBONATE: 84 INJECTION INTRAVENOUS at 17:26

## 2017-05-19 RX ADMIN — HYDRALAZINE HYDROCHLORIDE 20 MG: 20 INJECTION INTRAMUSCULAR; INTRAVENOUS at 11:40

## 2017-05-19 RX ADMIN — DULOXETINE HYDROCHLORIDE 60 MG: 60 CAPSULE, DELAYED RELEASE ORAL at 08:19

## 2017-05-19 RX ADMIN — METOCLOPRAMIDE 5 MG: 5 TABLET ORAL at 12:37

## 2017-05-19 RX ADMIN — INSULIN LISPRO 12 UNITS: 100 INJECTION, SOLUTION INTRAVENOUS; SUBCUTANEOUS at 11:37

## 2017-05-19 RX ADMIN — INSULIN LISPRO 6 UNITS: 100 INJECTION, SOLUTION INTRAVENOUS; SUBCUTANEOUS at 08:19

## 2017-05-19 ASSESSMENT — PAIN SCALES - GENERAL
PAINLEVEL_OUTOF10: 0

## 2017-05-20 LAB
ABO/RH: NORMAL
ALBUMIN SERPL-MCNC: 2.7 G/DL (ref 3.5–5.2)
ALP BLD-CCNC: 62 U/L (ref 35–104)
ALT SERPL-CCNC: 11 U/L (ref 5–33)
ANION GAP SERPL CALCULATED.3IONS-SCNC: 14 MMOL/L (ref 7–19)
ANTIBODY SCREEN: NORMAL
AST SERPL-CCNC: 11 U/L (ref 5–32)
BASOPHILS ABSOLUTE: 0 K/UL (ref 0–0.2)
BASOPHILS RELATIVE PERCENT: 0.7 % (ref 0–1)
BILIRUB SERPL-MCNC: <0.2 MG/DL (ref 0.2–1.2)
BLOOD BANK DISPENSE STATUS: NORMAL
BLOOD BANK DISPENSE STATUS: NORMAL
BLOOD BANK PRODUCT CODE: NORMAL
BLOOD BANK PRODUCT CODE: NORMAL
BPU ID: NORMAL
BPU ID: NORMAL
BUN BLDV-MCNC: 56 MG/DL (ref 6–20)
CALCIUM SERPL-MCNC: 7.6 MG/DL (ref 8.6–10)
CHLORIDE BLD-SCNC: 94 MMOL/L (ref 98–111)
CO2: 26 MMOL/L (ref 22–29)
CREAT SERPL-MCNC: 7.3 MG/DL (ref 0.5–0.9)
DESCRIPTION BLOOD BANK: NORMAL
DESCRIPTION BLOOD BANK: NORMAL
EOSINOPHILS ABSOLUTE: 0.1 K/UL (ref 0–0.6)
EOSINOPHILS RELATIVE PERCENT: 2.6 % (ref 0–5)
GFR NON-AFRICAN AMERICAN: 6
GLUCOSE BLD-MCNC: 255 MG/DL (ref 70–99)
GLUCOSE BLD-MCNC: 284 MG/DL (ref 70–99)
GLUCOSE BLD-MCNC: 339 MG/DL (ref 70–99)
GLUCOSE BLD-MCNC: 363 MG/DL (ref 74–109)
HCT VFR BLD CALC: 21.6 % (ref 37–47)
HEMOGLOBIN: 6.8 G/DL (ref 12–16)
LYMPHOCYTES ABSOLUTE: 1.5 K/UL (ref 1.1–4.5)
LYMPHOCYTES RELATIVE PERCENT: 27.6 % (ref 20–40)
MAGNESIUM: 2.1 MG/DL (ref 1.6–2.6)
MCH RBC QN AUTO: 28.3 PG (ref 27–31)
MCHC RBC AUTO-ENTMCNC: 31.5 G/DL (ref 33–37)
MCV RBC AUTO: 90 FL (ref 81–99)
MONOCYTES ABSOLUTE: 0.6 K/UL (ref 0–0.9)
MONOCYTES RELATIVE PERCENT: 10.7 % (ref 0–10)
NEUTROPHILS ABSOLUTE: 3.2 K/UL (ref 1.5–7.5)
NEUTROPHILS RELATIVE PERCENT: 58.2 % (ref 50–65)
PDW BLD-RTO: 13.2 % (ref 11.5–14.5)
PERFORMED ON: ABNORMAL
PHOSPHORUS: 5.2 MG/DL (ref 2.5–4.5)
PLATELET # BLD: 132 K/UL (ref 130–400)
PMV BLD AUTO: 9.7 FL (ref 7.4–10.4)
POTASSIUM SERPL-SCNC: 4.6 MMOL/L (ref 3.5–5)
RBC # BLD: 2.4 M/UL (ref 4.2–5.4)
SODIUM BLD-SCNC: 134 MMOL/L (ref 136–145)
TOTAL PROTEIN: 5.3 G/DL (ref 6.6–8.7)
WBC # BLD: 5.4 K/UL (ref 4.8–10.8)

## 2017-05-20 PROCEDURE — 6370000000 HC RX 637 (ALT 250 FOR IP): Performed by: NURSE PRACTITIONER

## 2017-05-20 PROCEDURE — 80053 COMPREHEN METABOLIC PANEL: CPT

## 2017-05-20 PROCEDURE — 86900 BLOOD TYPING SEROLOGIC ABO: CPT

## 2017-05-20 PROCEDURE — 2580000003 HC RX 258: Performed by: HOSPITALIST

## 2017-05-20 PROCEDURE — 8010000000 HC HEMODIALYSIS ACUTE INPT

## 2017-05-20 PROCEDURE — 6370000000 HC RX 637 (ALT 250 FOR IP): Performed by: HOSPITALIST

## 2017-05-20 PROCEDURE — 6370000000 HC RX 637 (ALT 250 FOR IP): Performed by: INTERNAL MEDICINE

## 2017-05-20 PROCEDURE — 83735 ASSAY OF MAGNESIUM: CPT

## 2017-05-20 PROCEDURE — 2500000003 HC RX 250 WO HCPCS: Performed by: HOSPITALIST

## 2017-05-20 PROCEDURE — 85025 COMPLETE CBC W/AUTO DIFF WBC: CPT

## 2017-05-20 PROCEDURE — 1210000000 HC MED SURG R&B

## 2017-05-20 PROCEDURE — 99999 PR OFFICE/OUTPT VISIT,PROCEDURE ONLY: CPT | Performed by: INTERNAL MEDICINE

## 2017-05-20 PROCEDURE — 86850 RBC ANTIBODY SCREEN: CPT

## 2017-05-20 PROCEDURE — 36415 COLL VENOUS BLD VENIPUNCTURE: CPT

## 2017-05-20 PROCEDURE — 86901 BLOOD TYPING SEROLOGIC RH(D): CPT

## 2017-05-20 PROCEDURE — P9016 RBC LEUKOCYTES REDUCED: HCPCS

## 2017-05-20 PROCEDURE — 82948 REAGENT STRIP/BLOOD GLUCOSE: CPT

## 2017-05-20 PROCEDURE — 84100 ASSAY OF PHOSPHORUS: CPT

## 2017-05-20 PROCEDURE — 36430 TRANSFUSION BLD/BLD COMPNT: CPT

## 2017-05-20 RX ORDER — ISOSORBIDE MONONITRATE 60 MG/1
60 TABLET, EXTENDED RELEASE ORAL DAILY
Status: DISCONTINUED | OUTPATIENT
Start: 2017-05-21 | End: 2017-05-21

## 2017-05-20 RX ORDER — ISOSORBIDE MONONITRATE 60 MG/1
30 TABLET, EXTENDED RELEASE ORAL ONCE
Status: COMPLETED | OUTPATIENT
Start: 2017-05-20 | End: 2017-05-20

## 2017-05-20 RX ORDER — 0.9 % SODIUM CHLORIDE 0.9 %
250 INTRAVENOUS SOLUTION INTRAVENOUS ONCE
Status: DISCONTINUED | OUTPATIENT
Start: 2017-05-20 | End: 2017-05-27 | Stop reason: HOSPADM

## 2017-05-20 RX ADMIN — ISOSORBIDE MONONITRATE 30 MG: 30 TABLET, EXTENDED RELEASE ORAL at 10:39

## 2017-05-20 RX ADMIN — SODIUM BICARBONATE: 84 INJECTION INTRAVENOUS at 05:48

## 2017-05-20 RX ADMIN — DULOXETINE HYDROCHLORIDE 60 MG: 60 CAPSULE, DELAYED RELEASE ORAL at 10:39

## 2017-05-20 RX ADMIN — SIMVASTATIN 40 MG: 40 TABLET, FILM COATED ORAL at 10:40

## 2017-05-20 RX ADMIN — ATENOLOL 100 MG: 50 TABLET ORAL at 20:53

## 2017-05-20 RX ADMIN — INSULIN LISPRO 6 UNITS: 100 INJECTION, SOLUTION INTRAVENOUS; SUBCUTANEOUS at 11:10

## 2017-05-20 RX ADMIN — INSULIN LISPRO 8 UNITS: 100 INJECTION, SOLUTION INTRAVENOUS; SUBCUTANEOUS at 16:42

## 2017-05-20 RX ADMIN — ROPINIROLE HYDROCHLORIDE 2 MG: 2 TABLET, FILM COATED ORAL at 20:53

## 2017-05-20 RX ADMIN — METOCLOPRAMIDE 5 MG: 5 TABLET ORAL at 20:53

## 2017-05-20 RX ADMIN — HYDRALAZINE HYDROCHLORIDE 25 MG: 50 TABLET, FILM COATED ORAL at 13:07

## 2017-05-20 RX ADMIN — ISOSORBIDE MONONITRATE 30 MG: 60 TABLET, EXTENDED RELEASE ORAL at 18:40

## 2017-05-20 RX ADMIN — NIFEDIPINE 60 MG: 60 TABLET, FILM COATED, EXTENDED RELEASE ORAL at 10:40

## 2017-05-20 RX ADMIN — INSULIN LISPRO 3 UNITS: 100 INJECTION, SOLUTION INTRAVENOUS; SUBCUTANEOUS at 20:59

## 2017-05-20 RX ADMIN — HYDRALAZINE HYDROCHLORIDE 50 MG: 50 TABLET, FILM COATED ORAL at 13:07

## 2017-05-20 RX ADMIN — FAMOTIDINE 20 MG: 20 TABLET ORAL at 10:40

## 2017-05-20 RX ADMIN — LEVOTHYROXINE SODIUM 137 MCG: 137 TABLET ORAL at 10:40

## 2017-05-20 RX ADMIN — METOCLOPRAMIDE 5 MG: 5 TABLET ORAL at 10:39

## 2017-05-20 RX ADMIN — HYDRALAZINE HYDROCHLORIDE 75 MG: 50 TABLET, FILM COATED ORAL at 20:52

## 2017-05-20 RX ADMIN — HYDRALAZINE HYDROCHLORIDE 50 MG: 50 TABLET, FILM COATED ORAL at 10:43

## 2017-05-20 RX ADMIN — METOCLOPRAMIDE 5 MG: 5 TABLET ORAL at 13:06

## 2017-05-20 RX ADMIN — METOCLOPRAMIDE 5 MG: 5 TABLET ORAL at 16:42

## 2017-05-21 ENCOUNTER — APPOINTMENT (OUTPATIENT)
Dept: CT IMAGING | Age: 47
DRG: 682 | End: 2017-05-21
Payer: MEDICARE

## 2017-05-21 LAB
ALBUMIN SERPL-MCNC: 3 G/DL (ref 3.5–5.2)
ALP BLD-CCNC: 68 U/L (ref 35–104)
ALT SERPL-CCNC: 12 U/L (ref 5–33)
ANION GAP SERPL CALCULATED.3IONS-SCNC: 17 MMOL/L (ref 7–19)
AST SERPL-CCNC: 17 U/L (ref 5–32)
BASOPHILS ABSOLUTE: 0.1 K/UL (ref 0–0.2)
BASOPHILS RELATIVE PERCENT: 1.1 % (ref 0–1)
BILIRUB SERPL-MCNC: 0.7 MG/DL (ref 0.2–1.2)
BUN BLDV-MCNC: 42 MG/DL (ref 6–20)
CALCIUM SERPL-MCNC: 8.4 MG/DL (ref 8.6–10)
CHLORIDE BLD-SCNC: 95 MMOL/L (ref 98–111)
CO2: 26 MMOL/L (ref 22–29)
CREAT SERPL-MCNC: 5.5 MG/DL (ref 0.5–0.9)
EOSINOPHILS ABSOLUTE: 0.2 K/UL (ref 0–0.6)
EOSINOPHILS RELATIVE PERCENT: 3 % (ref 0–5)
GFR NON-AFRICAN AMERICAN: 8
GLUCOSE BLD-MCNC: 196 MG/DL (ref 74–109)
GLUCOSE BLD-MCNC: 256 MG/DL (ref 70–99)
GLUCOSE BLD-MCNC: 288 MG/DL (ref 70–99)
GLUCOSE BLD-MCNC: 299 MG/DL (ref 70–99)
GLUCOSE BLD-MCNC: 341 MG/DL (ref 70–99)
HCT VFR BLD CALC: 27.8 % (ref 37–47)
HEMOGLOBIN: 9.1 G/DL (ref 12–16)
LYMPHOCYTES ABSOLUTE: 1.8 K/UL (ref 1.1–4.5)
LYMPHOCYTES RELATIVE PERCENT: 28.8 % (ref 20–40)
MAGNESIUM: 2 MG/DL (ref 1.6–2.6)
MCH RBC QN AUTO: 29 PG (ref 27–31)
MCHC RBC AUTO-ENTMCNC: 32.7 G/DL (ref 33–37)
MCV RBC AUTO: 88.5 FL (ref 81–99)
MONOCYTES ABSOLUTE: 0.7 K/UL (ref 0–0.9)
MONOCYTES RELATIVE PERCENT: 11 % (ref 0–10)
NEUTROPHILS ABSOLUTE: 3.5 K/UL (ref 1.5–7.5)
NEUTROPHILS RELATIVE PERCENT: 55.8 % (ref 50–65)
PDW BLD-RTO: 13.6 % (ref 11.5–14.5)
PERFORMED ON: ABNORMAL
PHOSPHORUS: 4.2 MG/DL (ref 2.5–4.5)
PLATELET # BLD: 100 K/UL (ref 130–400)
PMV BLD AUTO: 9.4 FL (ref 7.4–10.4)
POTASSIUM SERPL-SCNC: 4.6 MMOL/L (ref 3.5–5)
RBC # BLD: 3.14 M/UL (ref 4.2–5.4)
SODIUM BLD-SCNC: 138 MMOL/L (ref 136–145)
TOTAL PROTEIN: 5.8 G/DL (ref 6.6–8.7)
URINE CULTURE, ROUTINE: NORMAL
WBC # BLD: 6.3 K/UL (ref 4.8–10.8)

## 2017-05-21 PROCEDURE — 82948 REAGENT STRIP/BLOOD GLUCOSE: CPT

## 2017-05-21 PROCEDURE — 99232 SBSQ HOSP IP/OBS MODERATE 35: CPT | Performed by: INTERNAL MEDICINE

## 2017-05-21 PROCEDURE — 6360000002 HC RX W HCPCS: Performed by: HOSPITALIST

## 2017-05-21 PROCEDURE — 6370000000 HC RX 637 (ALT 250 FOR IP): Performed by: HOSPITALIST

## 2017-05-21 PROCEDURE — 70450 CT HEAD/BRAIN W/O DYE: CPT

## 2017-05-21 PROCEDURE — 2580000003 HC RX 258: Performed by: INTERNAL MEDICINE

## 2017-05-21 PROCEDURE — 2100000000 HC CCU R&B

## 2017-05-21 PROCEDURE — 85025 COMPLETE CBC W/AUTO DIFF WBC: CPT

## 2017-05-21 PROCEDURE — 80053 COMPREHEN METABOLIC PANEL: CPT

## 2017-05-21 PROCEDURE — 36415 COLL VENOUS BLD VENIPUNCTURE: CPT

## 2017-05-21 PROCEDURE — 83735 ASSAY OF MAGNESIUM: CPT

## 2017-05-21 PROCEDURE — 2500000003 HC RX 250 WO HCPCS: Performed by: INTERNAL MEDICINE

## 2017-05-21 PROCEDURE — 84100 ASSAY OF PHOSPHORUS: CPT

## 2017-05-21 PROCEDURE — 6370000000 HC RX 637 (ALT 250 FOR IP): Performed by: INTERNAL MEDICINE

## 2017-05-21 RX ORDER — ISOSORBIDE MONONITRATE 60 MG/1
120 TABLET, EXTENDED RELEASE ORAL DAILY
Status: DISCONTINUED | OUTPATIENT
Start: 2017-05-21 | End: 2017-05-27 | Stop reason: HOSPADM

## 2017-05-21 RX ADMIN — ROPINIROLE HYDROCHLORIDE 2 MG: 2 TABLET, FILM COATED ORAL at 20:58

## 2017-05-21 RX ADMIN — ONDANSETRON 4 MG: 2 INJECTION INTRAMUSCULAR; INTRAVENOUS at 11:36

## 2017-05-21 RX ADMIN — LEVOTHYROXINE SODIUM 137 MCG: 137 TABLET ORAL at 05:51

## 2017-05-21 RX ADMIN — ISOSORBIDE MONONITRATE 120 MG: 60 TABLET, EXTENDED RELEASE ORAL at 13:38

## 2017-05-21 RX ADMIN — ONDANSETRON 4 MG: 2 INJECTION INTRAMUSCULAR; INTRAVENOUS at 04:00

## 2017-05-21 RX ADMIN — ATENOLOL 100 MG: 50 TABLET ORAL at 20:58

## 2017-05-21 RX ADMIN — INSULIN LISPRO 6 UNITS: 100 INJECTION, SOLUTION INTRAVENOUS; SUBCUTANEOUS at 11:40

## 2017-05-21 RX ADMIN — HYDRALAZINE HYDROCHLORIDE 20 MG: 20 INJECTION INTRAMUSCULAR; INTRAVENOUS at 07:20

## 2017-05-21 RX ADMIN — INSULIN LISPRO 8 UNITS: 100 INJECTION, SOLUTION INTRAVENOUS; SUBCUTANEOUS at 16:50

## 2017-05-21 RX ADMIN — HYDRALAZINE HYDROCHLORIDE 75 MG: 50 TABLET, FILM COATED ORAL at 20:58

## 2017-05-21 RX ADMIN — LABETALOL HYDROCHLORIDE 20 MG: 5 INJECTION, SOLUTION INTRAVENOUS at 22:42

## 2017-05-21 RX ADMIN — INSULIN LISPRO 3 UNITS: 100 INJECTION, SOLUTION INTRAVENOUS; SUBCUTANEOUS at 20:59

## 2017-05-21 RX ADMIN — DEXTROSE MONOHYDRATE 5 MG/HR: 50 INJECTION, SOLUTION INTRAVENOUS at 09:20

## 2017-05-21 RX ADMIN — FAMOTIDINE 20 MG: 20 TABLET ORAL at 11:36

## 2017-05-21 RX ADMIN — ATENOLOL 100 MG: 50 TABLET ORAL at 11:36

## 2017-05-21 RX ADMIN — METOCLOPRAMIDE 5 MG: 5 TABLET ORAL at 20:59

## 2017-05-21 RX ADMIN — HYDRALAZINE HYDROCHLORIDE 75 MG: 50 TABLET, FILM COATED ORAL at 13:38

## 2017-05-21 ASSESSMENT — PAIN SCALES - GENERAL
PAINLEVEL_OUTOF10: 0
PAINLEVEL_OUTOF10: 0

## 2017-05-22 PROBLEM — E16.2 HYPOGLYCEMIA: Status: ACTIVE | Noted: 2017-05-22

## 2017-05-22 LAB
ALBUMIN SERPL-MCNC: 3 G/DL (ref 3.5–5.2)
ALP BLD-CCNC: 65 U/L (ref 35–104)
ALT SERPL-CCNC: 11 U/L (ref 5–33)
ANION GAP SERPL CALCULATED.3IONS-SCNC: 17 MMOL/L (ref 7–19)
AST SERPL-CCNC: 15 U/L (ref 5–32)
BASOPHILS ABSOLUTE: 0.1 K/UL (ref 0–0.2)
BASOPHILS RELATIVE PERCENT: 0.9 % (ref 0–1)
BILIRUB SERPL-MCNC: 0.3 MG/DL (ref 0.2–1.2)
BUN BLDV-MCNC: 48 MG/DL (ref 6–20)
CALCIUM SERPL-MCNC: 8.6 MG/DL (ref 8.6–10)
CHLORIDE BLD-SCNC: 95 MMOL/L (ref 98–111)
CO2: 26 MMOL/L (ref 22–29)
CREAT SERPL-MCNC: 6.7 MG/DL (ref 0.5–0.9)
EKG P AXIS: 84 DEGREES
EKG P-R INTERVAL: 184 MS
EKG Q-T INTERVAL: 566 MS
EKG QRS DURATION: 106 MS
EKG QTC CALCULATION (BAZETT): 544 MS
EKG T AXIS: 53 DEGREES
EOSINOPHILS ABSOLUTE: 0.2 K/UL (ref 0–0.6)
EOSINOPHILS RELATIVE PERCENT: 2.9 % (ref 0–5)
GFR NON-AFRICAN AMERICAN: 7
GLUCOSE BLD-MCNC: 204 MG/DL (ref 70–99)
GLUCOSE BLD-MCNC: 215 MG/DL (ref 74–109)
GLUCOSE BLD-MCNC: 224 MG/DL (ref 70–99)
GLUCOSE BLD-MCNC: 260 MG/DL (ref 70–99)
GLUCOSE BLD-MCNC: 277 MG/DL (ref 70–99)
GLUCOSE BLD-MCNC: 307 MG/DL (ref 70–99)
HCT VFR BLD CALC: 27.6 % (ref 37–47)
HEMOGLOBIN: 8.8 G/DL (ref 12–16)
LYMPHOCYTES ABSOLUTE: 1.5 K/UL (ref 1.1–4.5)
LYMPHOCYTES RELATIVE PERCENT: 26.2 % (ref 20–40)
MAGNESIUM: 1.9 MG/DL (ref 1.6–2.6)
MCH RBC QN AUTO: 28.8 PG (ref 27–31)
MCHC RBC AUTO-ENTMCNC: 31.9 G/DL (ref 33–37)
MCV RBC AUTO: 90.2 FL (ref 81–99)
MONOCYTES ABSOLUTE: 0.6 K/UL (ref 0–0.9)
MONOCYTES RELATIVE PERCENT: 10.4 % (ref 0–10)
NEUTROPHILS ABSOLUTE: 3.5 K/UL (ref 1.5–7.5)
NEUTROPHILS RELATIVE PERCENT: 59.4 % (ref 50–65)
PDW BLD-RTO: 13.6 % (ref 11.5–14.5)
PERFORMED ON: ABNORMAL
PHOSPHORUS: 4.9 MG/DL (ref 2.5–4.5)
PLATELET # BLD: 109 K/UL (ref 130–400)
PMV BLD AUTO: 9.8 FL (ref 7.4–10.4)
POTASSIUM SERPL-SCNC: 4.6 MMOL/L (ref 3.5–5)
RBC # BLD: 3.06 M/UL (ref 4.2–5.4)
SODIUM BLD-SCNC: 138 MMOL/L (ref 136–145)
TOTAL PROTEIN: 5.7 G/DL (ref 6.6–8.7)
WBC # BLD: 5.9 K/UL (ref 4.8–10.8)

## 2017-05-22 PROCEDURE — 6360000002 HC RX W HCPCS: Performed by: HOSPITALIST

## 2017-05-22 PROCEDURE — 85025 COMPLETE CBC W/AUTO DIFF WBC: CPT

## 2017-05-22 PROCEDURE — 80053 COMPREHEN METABOLIC PANEL: CPT

## 2017-05-22 PROCEDURE — 83735 ASSAY OF MAGNESIUM: CPT

## 2017-05-22 PROCEDURE — 36415 COLL VENOUS BLD VENIPUNCTURE: CPT

## 2017-05-22 PROCEDURE — 6370000000 HC RX 637 (ALT 250 FOR IP): Performed by: INTERNAL MEDICINE

## 2017-05-22 PROCEDURE — 6370000000 HC RX 637 (ALT 250 FOR IP): Performed by: HOSPITALIST

## 2017-05-22 PROCEDURE — 82948 REAGENT STRIP/BLOOD GLUCOSE: CPT

## 2017-05-22 PROCEDURE — 84100 ASSAY OF PHOSPHORUS: CPT

## 2017-05-22 PROCEDURE — 86022 PLATELET ANTIBODIES: CPT

## 2017-05-22 PROCEDURE — C9113 INJ PANTOPRAZOLE SODIUM, VIA: HCPCS | Performed by: HOSPITALIST

## 2017-05-22 PROCEDURE — 2100000000 HC CCU R&B

## 2017-05-22 PROCEDURE — 99233 SBSQ HOSP IP/OBS HIGH 50: CPT | Performed by: HOSPITALIST

## 2017-05-22 PROCEDURE — 8010000000 HC HEMODIALYSIS ACUTE INPT

## 2017-05-22 RX ORDER — PANTOPRAZOLE SODIUM 40 MG/10ML
40 INJECTION, POWDER, LYOPHILIZED, FOR SOLUTION INTRAVENOUS DAILY
Status: DISCONTINUED | OUTPATIENT
Start: 2017-05-22 | End: 2017-05-25 | Stop reason: CLARIF

## 2017-05-22 RX ORDER — SUCRALFATE ORAL 1 G/10ML
1 SUSPENSION ORAL
Status: DISCONTINUED | OUTPATIENT
Start: 2017-05-22 | End: 2017-05-27 | Stop reason: HOSPADM

## 2017-05-22 RX ORDER — HEPARIN SODIUM 5000 [USP'U]/ML
5000 INJECTION, SOLUTION INTRAVENOUS; SUBCUTANEOUS EVERY 8 HOURS SCHEDULED
Status: DISCONTINUED | OUTPATIENT
Start: 2017-05-22 | End: 2017-05-27 | Stop reason: HOSPADM

## 2017-05-22 RX ORDER — HYDRALAZINE HYDROCHLORIDE 50 MG/1
100 TABLET, FILM COATED ORAL EVERY 8 HOURS SCHEDULED
Status: DISCONTINUED | OUTPATIENT
Start: 2017-05-22 | End: 2017-05-27 | Stop reason: HOSPADM

## 2017-05-22 RX ADMIN — SIMVASTATIN 40 MG: 40 TABLET, FILM COATED ORAL at 08:33

## 2017-05-22 RX ADMIN — LEVOTHYROXINE SODIUM 137 MCG: 137 TABLET ORAL at 06:04

## 2017-05-22 RX ADMIN — LABETALOL HYDROCHLORIDE 20 MG: 5 INJECTION, SOLUTION INTRAVENOUS at 10:57

## 2017-05-22 RX ADMIN — DULOXETINE HYDROCHLORIDE 60 MG: 60 CAPSULE, DELAYED RELEASE ORAL at 08:35

## 2017-05-22 RX ADMIN — INSULIN LISPRO 8 UNITS: 100 INJECTION, SOLUTION INTRAVENOUS; SUBCUTANEOUS at 17:53

## 2017-05-22 RX ADMIN — HYDRALAZINE HYDROCHLORIDE 100 MG: 50 TABLET, FILM COATED ORAL at 15:40

## 2017-05-22 RX ADMIN — HYDRALAZINE HYDROCHLORIDE 100 MG: 50 TABLET, FILM COATED ORAL at 21:36

## 2017-05-22 RX ADMIN — HYDRALAZINE HYDROCHLORIDE 20 MG: 20 INJECTION INTRAMUSCULAR; INTRAVENOUS at 00:00

## 2017-05-22 RX ADMIN — INSULIN LISPRO 6 UNITS: 100 INJECTION, SOLUTION INTRAVENOUS; SUBCUTANEOUS at 08:14

## 2017-05-22 RX ADMIN — SUCRALFATE 1 G: 1 SUSPENSION ORAL at 17:09

## 2017-05-22 RX ADMIN — HEPARIN SODIUM 5000 UNITS: 5000 INJECTION, SOLUTION INTRAVENOUS; SUBCUTANEOUS at 21:36

## 2017-05-22 RX ADMIN — HYDRALAZINE HYDROCHLORIDE 20 MG: 20 INJECTION INTRAMUSCULAR; INTRAVENOUS at 04:13

## 2017-05-22 RX ADMIN — FAMOTIDINE 20 MG: 20 TABLET ORAL at 08:33

## 2017-05-22 RX ADMIN — METOCLOPRAMIDE 5 MG: 5 TABLET ORAL at 17:09

## 2017-05-22 RX ADMIN — INSULIN LISPRO 10 UNITS: 100 INJECTION, SOLUTION INTRAVENOUS; SUBCUTANEOUS at 20:17

## 2017-05-22 RX ADMIN — LABETALOL HYDROCHLORIDE 20 MG: 5 INJECTION, SOLUTION INTRAVENOUS at 03:10

## 2017-05-22 RX ADMIN — ATENOLOL 100 MG: 50 TABLET ORAL at 20:17

## 2017-05-22 RX ADMIN — INSULIN LISPRO 5 UNITS: 100 INJECTION, SOLUTION INTRAVENOUS; SUBCUTANEOUS at 15:39

## 2017-05-22 RX ADMIN — METOCLOPRAMIDE 5 MG: 5 TABLET ORAL at 20:17

## 2017-05-22 RX ADMIN — ROPINIROLE HYDROCHLORIDE 2 MG: 2 TABLET, FILM COATED ORAL at 20:17

## 2017-05-22 RX ADMIN — PANTOPRAZOLE SODIUM 40 MG: 40 INJECTION, POWDER, FOR SOLUTION INTRAVENOUS at 08:33

## 2017-05-22 RX ADMIN — HYDRALAZINE HYDROCHLORIDE 75 MG: 50 TABLET, FILM COATED ORAL at 06:04

## 2017-05-22 RX ADMIN — METOCLOPRAMIDE 5 MG: 5 TABLET ORAL at 08:35

## 2017-05-22 RX ADMIN — INSULIN LISPRO 6 UNITS: 100 INJECTION, SOLUTION INTRAVENOUS; SUBCUTANEOUS at 04:22

## 2017-05-22 RX ADMIN — SUCRALFATE 1 G: 1 SUSPENSION ORAL at 20:17

## 2017-05-22 RX ADMIN — ATENOLOL 100 MG: 50 TABLET ORAL at 08:33

## 2017-05-22 RX ADMIN — ISOSORBIDE MONONITRATE 120 MG: 60 TABLET, EXTENDED RELEASE ORAL at 11:36

## 2017-05-22 ASSESSMENT — PAIN SCALES - GENERAL
PAINLEVEL_OUTOF10: 0

## 2017-05-23 PROBLEM — D63.1 ANEMIA IN CHRONIC KIDNEY DISEASE (CKD): Status: ACTIVE | Noted: 2017-05-23

## 2017-05-23 PROBLEM — N18.6 ESRD ON HEMODIALYSIS (HCC): Status: ACTIVE | Noted: 2017-05-23

## 2017-05-23 PROBLEM — Z99.2 ESRD ON HEMODIALYSIS (HCC): Status: ACTIVE | Noted: 2017-05-23

## 2017-05-23 PROBLEM — N18.9 ANEMIA IN CHRONIC KIDNEY DISEASE (CKD): Status: ACTIVE | Noted: 2017-05-23

## 2017-05-23 PROBLEM — E87.5 HYPERKALEMIA: Status: ACTIVE | Noted: 2017-05-23

## 2017-05-23 PROBLEM — E61.1 IRON DEFICIENCY: Status: ACTIVE | Noted: 2017-05-23

## 2017-05-23 LAB
ALBUMIN SERPL-MCNC: 2.8 G/DL (ref 3.5–5.2)
ALP BLD-CCNC: 64 U/L (ref 35–104)
ALT SERPL-CCNC: 11 U/L (ref 5–33)
ANION GAP SERPL CALCULATED.3IONS-SCNC: 13 MMOL/L (ref 7–19)
AST SERPL-CCNC: 16 U/L (ref 5–32)
BASOPHILS ABSOLUTE: 0.1 K/UL (ref 0–0.2)
BASOPHILS RELATIVE PERCENT: 0.9 % (ref 0–1)
BILIRUB SERPL-MCNC: <0.2 MG/DL (ref 0.2–1.2)
BUN BLDV-MCNC: 32 MG/DL (ref 6–20)
CALCIUM SERPL-MCNC: 8.4 MG/DL (ref 8.6–10)
CHLORIDE BLD-SCNC: 97 MMOL/L (ref 98–111)
CO2: 27 MMOL/L (ref 22–29)
CREAT SERPL-MCNC: 4.7 MG/DL (ref 0.5–0.9)
EOSINOPHILS ABSOLUTE: 0.2 K/UL (ref 0–0.6)
EOSINOPHILS RELATIVE PERCENT: 3 % (ref 0–5)
GFR NON-AFRICAN AMERICAN: 10
GLUCOSE BLD-MCNC: 149 MG/DL (ref 74–109)
GLUCOSE BLD-MCNC: 181 MG/DL (ref 70–99)
GLUCOSE BLD-MCNC: 283 MG/DL (ref 70–99)
GLUCOSE BLD-MCNC: 293 MG/DL (ref 70–99)
GLUCOSE BLD-MCNC: 367 MG/DL (ref 70–99)
HCT VFR BLD CALC: 26.1 % (ref 37–47)
HEMOGLOBIN: 8.4 G/DL (ref 12–16)
LYMPHOCYTES ABSOLUTE: 1.6 K/UL (ref 1.1–4.5)
LYMPHOCYTES RELATIVE PERCENT: 27.5 % (ref 20–40)
MAGNESIUM: 1.9 MG/DL (ref 1.6–2.6)
MCH RBC QN AUTO: 29.6 PG (ref 27–31)
MCHC RBC AUTO-ENTMCNC: 32.2 G/DL (ref 33–37)
MCV RBC AUTO: 91.9 FL (ref 81–99)
MONOCYTES ABSOLUTE: 0.7 K/UL (ref 0–0.9)
MONOCYTES RELATIVE PERCENT: 12.3 % (ref 0–10)
NEUTROPHILS ABSOLUTE: 3.2 K/UL (ref 1.5–7.5)
NEUTROPHILS RELATIVE PERCENT: 56.1 % (ref 50–65)
PDW BLD-RTO: 13.2 % (ref 11.5–14.5)
PERFORMED ON: ABNORMAL
PHOSPHORUS: 3.6 MG/DL (ref 2.5–4.5)
PLATELET # BLD: 103 K/UL (ref 130–400)
PMV BLD AUTO: 9.6 FL (ref 7.4–10.4)
POTASSIUM SERPL-SCNC: 4.7 MMOL/L (ref 3.5–5)
RBC # BLD: 2.84 M/UL (ref 4.2–5.4)
SODIUM BLD-SCNC: 137 MMOL/L (ref 136–145)
TOTAL PROTEIN: 5.5 G/DL (ref 6.6–8.7)
WBC # BLD: 5.7 K/UL (ref 4.8–10.8)

## 2017-05-23 PROCEDURE — C9113 INJ PANTOPRAZOLE SODIUM, VIA: HCPCS | Performed by: HOSPITALIST

## 2017-05-23 PROCEDURE — 6360000002 HC RX W HCPCS: Performed by: HOSPITALIST

## 2017-05-23 PROCEDURE — 6370000000 HC RX 637 (ALT 250 FOR IP): Performed by: INTERNAL MEDICINE

## 2017-05-23 PROCEDURE — 6370000000 HC RX 637 (ALT 250 FOR IP): Performed by: HOSPITALIST

## 2017-05-23 PROCEDURE — 36415 COLL VENOUS BLD VENIPUNCTURE: CPT

## 2017-05-23 PROCEDURE — 85025 COMPLETE CBC W/AUTO DIFF WBC: CPT

## 2017-05-23 PROCEDURE — 80053 COMPREHEN METABOLIC PANEL: CPT

## 2017-05-23 PROCEDURE — 82948 REAGENT STRIP/BLOOD GLUCOSE: CPT

## 2017-05-23 PROCEDURE — 2580000003 HC RX 258: Performed by: INTERNAL MEDICINE

## 2017-05-23 PROCEDURE — 83735 ASSAY OF MAGNESIUM: CPT

## 2017-05-23 PROCEDURE — 2500000003 HC RX 250 WO HCPCS: Performed by: INTERNAL MEDICINE

## 2017-05-23 PROCEDURE — 84100 ASSAY OF PHOSPHORUS: CPT

## 2017-05-23 PROCEDURE — 2100000000 HC CCU R&B

## 2017-05-23 PROCEDURE — 99233 SBSQ HOSP IP/OBS HIGH 50: CPT | Performed by: HOSPITALIST

## 2017-05-23 RX ADMIN — ATENOLOL 100 MG: 50 TABLET ORAL at 08:40

## 2017-05-23 RX ADMIN — DEXTROSE MONOHYDRATE 5 MG/HR: 50 INJECTION, SOLUTION INTRAVENOUS at 04:39

## 2017-05-23 RX ADMIN — ISOSORBIDE MONONITRATE 120 MG: 60 TABLET, EXTENDED RELEASE ORAL at 08:39

## 2017-05-23 RX ADMIN — METOCLOPRAMIDE 5 MG: 5 TABLET ORAL at 11:44

## 2017-05-23 RX ADMIN — INSULIN LISPRO 4 UNITS: 100 INJECTION, SOLUTION INTRAVENOUS; SUBCUTANEOUS at 21:02

## 2017-05-23 RX ADMIN — SUCRALFATE 1 G: 1 SUSPENSION ORAL at 16:52

## 2017-05-23 RX ADMIN — DEXTROSE MONOHYDRATE 5 MG/HR: 50 INJECTION, SOLUTION INTRAVENOUS at 22:06

## 2017-05-23 RX ADMIN — FAMOTIDINE 20 MG: 20 TABLET ORAL at 08:39

## 2017-05-23 RX ADMIN — DULOXETINE HYDROCHLORIDE 60 MG: 60 CAPSULE, DELAYED RELEASE ORAL at 08:40

## 2017-05-23 RX ADMIN — LABETALOL HYDROCHLORIDE 20 MG: 5 INJECTION, SOLUTION INTRAVENOUS at 01:34

## 2017-05-23 RX ADMIN — HEPARIN SODIUM 5000 UNITS: 5000 INJECTION, SOLUTION INTRAVENOUS; SUBCUTANEOUS at 16:53

## 2017-05-23 RX ADMIN — METOCLOPRAMIDE 5 MG: 5 TABLET ORAL at 21:02

## 2017-05-23 RX ADMIN — LEVOTHYROXINE SODIUM 137 MCG: 137 TABLET ORAL at 05:35

## 2017-05-23 RX ADMIN — SUCRALFATE 1 G: 1 SUSPENSION ORAL at 21:02

## 2017-05-23 RX ADMIN — HEPARIN SODIUM 5000 UNITS: 5000 INJECTION, SOLUTION INTRAVENOUS; SUBCUTANEOUS at 08:43

## 2017-05-23 RX ADMIN — SUCRALFATE 1 G: 1 SUSPENSION ORAL at 11:44

## 2017-05-23 RX ADMIN — METOCLOPRAMIDE 5 MG: 5 TABLET ORAL at 08:39

## 2017-05-23 RX ADMIN — DEXTROSE MONOHYDRATE 5 MG/HR: 50 INJECTION, SOLUTION INTRAVENOUS at 17:06

## 2017-05-23 RX ADMIN — INSULIN LISPRO 13 UNITS: 100 INJECTION, SOLUTION INTRAVENOUS; SUBCUTANEOUS at 11:43

## 2017-05-23 RX ADMIN — ONDANSETRON 4 MG: 2 INJECTION INTRAMUSCULAR; INTRAVENOUS at 04:06

## 2017-05-23 RX ADMIN — SIMVASTATIN 40 MG: 40 TABLET, FILM COATED ORAL at 08:39

## 2017-05-23 RX ADMIN — ROPINIROLE HYDROCHLORIDE 2 MG: 2 TABLET, FILM COATED ORAL at 21:02

## 2017-05-23 RX ADMIN — INSULIN LISPRO 10 UNITS: 100 INJECTION, SOLUTION INTRAVENOUS; SUBCUTANEOUS at 16:52

## 2017-05-23 RX ADMIN — SUCRALFATE 1 G: 1 SUSPENSION ORAL at 05:35

## 2017-05-23 RX ADMIN — HEPARIN SODIUM 5000 UNITS: 5000 INJECTION, SOLUTION INTRAVENOUS; SUBCUTANEOUS at 22:06

## 2017-05-23 RX ADMIN — HYDRALAZINE HYDROCHLORIDE 20 MG: 20 INJECTION INTRAMUSCULAR; INTRAVENOUS at 03:36

## 2017-05-23 RX ADMIN — HYDRALAZINE HYDROCHLORIDE 100 MG: 50 TABLET, FILM COATED ORAL at 05:35

## 2017-05-23 RX ADMIN — HYDRALAZINE HYDROCHLORIDE 100 MG: 50 TABLET, FILM COATED ORAL at 22:06

## 2017-05-23 RX ADMIN — PANTOPRAZOLE SODIUM 40 MG: 40 INJECTION, POWDER, FOR SOLUTION INTRAVENOUS at 08:39

## 2017-05-23 RX ADMIN — ATENOLOL 100 MG: 50 TABLET ORAL at 21:02

## 2017-05-23 RX ADMIN — METOCLOPRAMIDE 5 MG: 5 TABLET ORAL at 16:52

## 2017-05-23 RX ADMIN — INSULIN LISPRO 9 UNITS: 100 INJECTION, SOLUTION INTRAVENOUS; SUBCUTANEOUS at 05:35

## 2017-05-23 RX ADMIN — HYDRALAZINE HYDROCHLORIDE 100 MG: 50 TABLET, FILM COATED ORAL at 16:52

## 2017-05-23 ASSESSMENT — PAIN SCALES - GENERAL
PAINLEVEL_OUTOF10: 0

## 2017-05-24 LAB
ANION GAP SERPL CALCULATED.3IONS-SCNC: 15 MMOL/L (ref 7–19)
BASOPHILS ABSOLUTE: 0.1 K/UL (ref 0–0.2)
BASOPHILS RELATIVE PERCENT: 0.8 % (ref 0–1)
BLOOD CULTURE, ROUTINE: NORMAL
BUN BLDV-MCNC: 43 MG/DL (ref 6–20)
CALCIUM SERPL-MCNC: 8.5 MG/DL (ref 8.6–10)
CHLORIDE BLD-SCNC: 94 MMOL/L (ref 98–111)
CO2: 25 MMOL/L (ref 22–29)
CREAT SERPL-MCNC: 6 MG/DL (ref 0.5–0.9)
CULTURE, BLOOD 2: NORMAL
EOSINOPHILS ABSOLUTE: 0.3 K/UL (ref 0–0.6)
EOSINOPHILS RELATIVE PERCENT: 3.9 % (ref 0–5)
GFR NON-AFRICAN AMERICAN: 7
GLUCOSE BLD-MCNC: 190 MG/DL (ref 74–109)
GLUCOSE BLD-MCNC: 268 MG/DL (ref 70–99)
GLUCOSE BLD-MCNC: 298 MG/DL (ref 70–99)
GLUCOSE BLD-MCNC: 332 MG/DL (ref 70–99)
HCT VFR BLD CALC: 27.4 % (ref 37–47)
HEMOGLOBIN: 8.7 G/DL (ref 12–16)
LYMPHOCYTES ABSOLUTE: 1.6 K/UL (ref 1.1–4.5)
LYMPHOCYTES RELATIVE PERCENT: 24.9 % (ref 20–40)
MAGNESIUM: 2 MG/DL (ref 1.6–2.6)
MCH RBC QN AUTO: 29.3 PG (ref 27–31)
MCHC RBC AUTO-ENTMCNC: 31.8 G/DL (ref 33–37)
MCV RBC AUTO: 92.3 FL (ref 81–99)
MONOCYTES ABSOLUTE: 0.7 K/UL (ref 0–0.9)
MONOCYTES RELATIVE PERCENT: 10.8 % (ref 0–10)
NEUTROPHILS ABSOLUTE: 3.8 K/UL (ref 1.5–7.5)
NEUTROPHILS RELATIVE PERCENT: 59.3 % (ref 50–65)
PDW BLD-RTO: 13.1 % (ref 11.5–14.5)
PERFORMED ON: ABNORMAL
PHOSPHORUS: 4.1 MG/DL (ref 2.5–4.5)
PLATELET # BLD: 123 K/UL (ref 130–400)
PMV BLD AUTO: 9.7 FL (ref 7.4–10.4)
POTASSIUM SERPL-SCNC: 5.2 MMOL/L (ref 3.5–5)
RBC # BLD: 2.97 M/UL (ref 4.2–5.4)
SODIUM BLD-SCNC: 134 MMOL/L (ref 136–145)
WBC # BLD: 6.4 K/UL (ref 4.8–10.8)

## 2017-05-24 PROCEDURE — 84100 ASSAY OF PHOSPHORUS: CPT

## 2017-05-24 PROCEDURE — 6360000002 HC RX W HCPCS: Performed by: HOSPITALIST

## 2017-05-24 PROCEDURE — 6370000000 HC RX 637 (ALT 250 FOR IP): Performed by: HOSPITALIST

## 2017-05-24 PROCEDURE — 2500000003 HC RX 250 WO HCPCS: Performed by: INTERNAL MEDICINE

## 2017-05-24 PROCEDURE — C9113 INJ PANTOPRAZOLE SODIUM, VIA: HCPCS | Performed by: HOSPITALIST

## 2017-05-24 PROCEDURE — 99233 SBSQ HOSP IP/OBS HIGH 50: CPT | Performed by: HOSPITALIST

## 2017-05-24 PROCEDURE — 85025 COMPLETE CBC W/AUTO DIFF WBC: CPT

## 2017-05-24 PROCEDURE — 6370000000 HC RX 637 (ALT 250 FOR IP): Performed by: INTERNAL MEDICINE

## 2017-05-24 PROCEDURE — 2580000003 HC RX 258: Performed by: INTERNAL MEDICINE

## 2017-05-24 PROCEDURE — 83735 ASSAY OF MAGNESIUM: CPT

## 2017-05-24 PROCEDURE — 82948 REAGENT STRIP/BLOOD GLUCOSE: CPT

## 2017-05-24 PROCEDURE — 80048 BASIC METABOLIC PNL TOTAL CA: CPT

## 2017-05-24 PROCEDURE — 36415 COLL VENOUS BLD VENIPUNCTURE: CPT

## 2017-05-24 PROCEDURE — 2100000000 HC CCU R&B

## 2017-05-24 RX ORDER — INSULIN GLARGINE 100 [IU]/ML
20 INJECTION, SOLUTION SUBCUTANEOUS NIGHTLY
Status: DISCONTINUED | OUTPATIENT
Start: 2017-05-24 | End: 2017-05-25

## 2017-05-24 RX ORDER — CLONIDINE HYDROCHLORIDE 0.1 MG/1
0.1 TABLET ORAL EVERY 4 HOURS PRN
Status: DISCONTINUED | OUTPATIENT
Start: 2017-05-24 | End: 2017-05-26 | Stop reason: SDUPTHER

## 2017-05-24 RX ADMIN — INSULIN LISPRO 10 UNITS: 100 INJECTION, SOLUTION INTRAVENOUS; SUBCUTANEOUS at 21:01

## 2017-05-24 RX ADMIN — FAMOTIDINE 20 MG: 20 TABLET ORAL at 08:17

## 2017-05-24 RX ADMIN — CLONIDINE HYDROCHLORIDE 0.1 MG: 0.1 TABLET ORAL at 22:05

## 2017-05-24 RX ADMIN — ATENOLOL 100 MG: 50 TABLET ORAL at 21:01

## 2017-05-24 RX ADMIN — INSULIN LISPRO 8 UNITS: 100 INJECTION, SOLUTION INTRAVENOUS; SUBCUTANEOUS at 05:16

## 2017-05-24 RX ADMIN — HYDRALAZINE HYDROCHLORIDE 100 MG: 50 TABLET, FILM COATED ORAL at 05:09

## 2017-05-24 RX ADMIN — DULOXETINE HYDROCHLORIDE 60 MG: 60 CAPSULE, DELAYED RELEASE ORAL at 08:17

## 2017-05-24 RX ADMIN — HYDRALAZINE HYDROCHLORIDE 100 MG: 50 TABLET, FILM COATED ORAL at 16:01

## 2017-05-24 RX ADMIN — METOCLOPRAMIDE 5 MG: 5 TABLET ORAL at 18:20

## 2017-05-24 RX ADMIN — CLONIDINE HYDROCHLORIDE 0.1 MG: 0.1 TABLET ORAL at 18:13

## 2017-05-24 RX ADMIN — DEXTROSE MONOHYDRATE 5 MG/HR: 50 INJECTION, SOLUTION INTRAVENOUS at 02:56

## 2017-05-24 RX ADMIN — HEPARIN SODIUM 5000 UNITS: 5000 INJECTION, SOLUTION INTRAVENOUS; SUBCUTANEOUS at 22:04

## 2017-05-24 RX ADMIN — INSULIN GLARGINE 20 UNITS: 100 INJECTION, SOLUTION SUBCUTANEOUS at 21:04

## 2017-05-24 RX ADMIN — SUCRALFATE 1 G: 1 SUSPENSION ORAL at 18:20

## 2017-05-24 RX ADMIN — HYDRALAZINE HYDROCHLORIDE 100 MG: 50 TABLET, FILM COATED ORAL at 22:05

## 2017-05-24 RX ADMIN — METOCLOPRAMIDE 5 MG: 5 TABLET ORAL at 08:17

## 2017-05-24 RX ADMIN — ISOSORBIDE MONONITRATE 120 MG: 60 TABLET, EXTENDED RELEASE ORAL at 08:16

## 2017-05-24 RX ADMIN — METOCLOPRAMIDE 5 MG: 5 TABLET ORAL at 21:01

## 2017-05-24 RX ADMIN — ATENOLOL 100 MG: 50 TABLET ORAL at 08:16

## 2017-05-24 RX ADMIN — HEPARIN SODIUM 5000 UNITS: 5000 INJECTION, SOLUTION INTRAVENOUS; SUBCUTANEOUS at 05:10

## 2017-05-24 RX ADMIN — ONDANSETRON 4 MG: 2 INJECTION INTRAMUSCULAR; INTRAVENOUS at 05:05

## 2017-05-24 RX ADMIN — SIMVASTATIN 40 MG: 40 TABLET, FILM COATED ORAL at 08:16

## 2017-05-24 RX ADMIN — SUCRALFATE 1 G: 1 SUSPENSION ORAL at 05:09

## 2017-05-24 RX ADMIN — PANTOPRAZOLE SODIUM 40 MG: 40 INJECTION, POWDER, FOR SOLUTION INTRAVENOUS at 08:16

## 2017-05-24 RX ADMIN — LEVOTHYROXINE SODIUM 137 MCG: 137 TABLET ORAL at 05:09

## 2017-05-24 RX ADMIN — ROPINIROLE HYDROCHLORIDE 2 MG: 2 TABLET, FILM COATED ORAL at 21:04

## 2017-05-24 RX ADMIN — INSULIN LISPRO 11 UNITS: 100 INJECTION, SOLUTION INTRAVENOUS; SUBCUTANEOUS at 18:20

## 2017-05-24 RX ADMIN — SUCRALFATE 1 G: 1 SUSPENSION ORAL at 21:01

## 2017-05-24 ASSESSMENT — PAIN SCALES - GENERAL
PAINLEVEL_OUTOF10: 0

## 2017-05-25 LAB
ANION GAP SERPL CALCULATED.3IONS-SCNC: 13 MMOL/L (ref 7–19)
BASOPHILS ABSOLUTE: 0.1 K/UL (ref 0–0.2)
BASOPHILS RELATIVE PERCENT: 1 % (ref 0–1)
BUN BLDV-MCNC: 28 MG/DL (ref 6–20)
CALCIUM SERPL-MCNC: 8.5 MG/DL (ref 8.6–10)
CHLORIDE BLD-SCNC: 99 MMOL/L (ref 98–111)
CO2: 27 MMOL/L (ref 22–29)
CREAT SERPL-MCNC: 5 MG/DL (ref 0.5–0.9)
EOSINOPHILS ABSOLUTE: 0.2 K/UL (ref 0–0.6)
EOSINOPHILS RELATIVE PERCENT: 3.9 % (ref 0–5)
GFR NON-AFRICAN AMERICAN: 9
GLUCOSE BLD-MCNC: 147 MG/DL (ref 70–99)
GLUCOSE BLD-MCNC: 149 MG/DL (ref 70–99)
GLUCOSE BLD-MCNC: 237 MG/DL (ref 70–99)
GLUCOSE BLD-MCNC: 35 MG/DL (ref 70–99)
GLUCOSE BLD-MCNC: 36 MG/DL (ref 74–109)
GLUCOSE BLD-MCNC: 62 MG/DL (ref 70–99)
GLUCOSE BLD-MCNC: 80 MG/DL (ref 70–99)
GLUCOSE BLD-MCNC: 94 MG/DL (ref 70–99)
HCT VFR BLD CALC: 26.5 % (ref 37–47)
HEMOGLOBIN: 8.2 G/DL (ref 12–16)
HEPARIN PF4 ANTIBODY: 0.07 OD
LYMPHOCYTES ABSOLUTE: 1.5 K/UL (ref 1.1–4.5)
LYMPHOCYTES RELATIVE PERCENT: 30.2 % (ref 20–40)
MAGNESIUM: 2 MG/DL (ref 1.6–2.6)
MCH RBC QN AUTO: 29 PG (ref 27–31)
MCHC RBC AUTO-ENTMCNC: 30.9 G/DL (ref 33–37)
MCV RBC AUTO: 93.6 FL (ref 81–99)
MONOCYTES ABSOLUTE: 0.6 K/UL (ref 0–0.9)
MONOCYTES RELATIVE PERCENT: 11.8 % (ref 0–10)
NEUTROPHILS ABSOLUTE: 2.6 K/UL (ref 1.5–7.5)
NEUTROPHILS RELATIVE PERCENT: 52.9 % (ref 50–65)
PDW BLD-RTO: 13 % (ref 11.5–14.5)
PERFORMED ON: ABNORMAL
PERFORMED ON: NORMAL
PERFORMED ON: NORMAL
PHOSPHORUS: 3.7 MG/DL (ref 2.5–4.5)
PLATELET # BLD: 111 K/UL (ref 130–400)
PMV BLD AUTO: 9.8 FL (ref 7.4–10.4)
POTASSIUM SERPL-SCNC: 4.8 MMOL/L (ref 3.5–5)
RBC # BLD: 2.83 M/UL (ref 4.2–5.4)
SODIUM BLD-SCNC: 139 MMOL/L (ref 136–145)
WBC # BLD: 4.8 K/UL (ref 4.8–10.8)

## 2017-05-25 PROCEDURE — 6370000000 HC RX 637 (ALT 250 FOR IP): Performed by: HOSPITALIST

## 2017-05-25 PROCEDURE — G8989 SELF CARE D/C STATUS: HCPCS

## 2017-05-25 PROCEDURE — 84100 ASSAY OF PHOSPHORUS: CPT

## 2017-05-25 PROCEDURE — 83735 ASSAY OF MAGNESIUM: CPT

## 2017-05-25 PROCEDURE — G8979 MOBILITY GOAL STATUS: HCPCS

## 2017-05-25 PROCEDURE — C9113 INJ PANTOPRAZOLE SODIUM, VIA: HCPCS | Performed by: HOSPITALIST

## 2017-05-25 PROCEDURE — 99233 SBSQ HOSP IP/OBS HIGH 50: CPT | Performed by: HOSPITALIST

## 2017-05-25 PROCEDURE — 82948 REAGENT STRIP/BLOOD GLUCOSE: CPT

## 2017-05-25 PROCEDURE — 6370000000 HC RX 637 (ALT 250 FOR IP): Performed by: INTERNAL MEDICINE

## 2017-05-25 PROCEDURE — 6360000002 HC RX W HCPCS: Performed by: HOSPITALIST

## 2017-05-25 PROCEDURE — 80048 BASIC METABOLIC PNL TOTAL CA: CPT

## 2017-05-25 PROCEDURE — 97162 PT EVAL MOD COMPLEX 30 MIN: CPT

## 2017-05-25 PROCEDURE — G8980 MOBILITY D/C STATUS: HCPCS

## 2017-05-25 PROCEDURE — G8987 SELF CARE CURRENT STATUS: HCPCS

## 2017-05-25 PROCEDURE — G8988 SELF CARE GOAL STATUS: HCPCS

## 2017-05-25 PROCEDURE — 97165 OT EVAL LOW COMPLEX 30 MIN: CPT

## 2017-05-25 PROCEDURE — G8978 MOBILITY CURRENT STATUS: HCPCS

## 2017-05-25 PROCEDURE — 36415 COLL VENOUS BLD VENIPUNCTURE: CPT

## 2017-05-25 PROCEDURE — 85025 COMPLETE CBC W/AUTO DIFF WBC: CPT

## 2017-05-25 PROCEDURE — 1210000000 HC MED SURG R&B

## 2017-05-25 RX ORDER — INSULIN GLARGINE 100 [IU]/ML
5 INJECTION, SOLUTION SUBCUTANEOUS NIGHTLY
Status: DISCONTINUED | OUTPATIENT
Start: 2017-05-25 | End: 2017-05-27 | Stop reason: HOSPADM

## 2017-05-25 RX ORDER — AMLODIPINE BESYLATE 5 MG/1
5 TABLET ORAL DAILY
Status: DISCONTINUED | OUTPATIENT
Start: 2017-05-25 | End: 2017-05-27 | Stop reason: HOSPADM

## 2017-05-25 RX ORDER — NICOTINE POLACRILEX 4 MG
15 LOZENGE BUCCAL PRN
Status: DISCONTINUED | OUTPATIENT
Start: 2017-05-25 | End: 2017-05-27 | Stop reason: HOSPADM

## 2017-05-25 RX ORDER — DEXTROSE MONOHYDRATE 50 MG/ML
100 INJECTION, SOLUTION INTRAVENOUS PRN
Status: DISCONTINUED | OUTPATIENT
Start: 2017-05-25 | End: 2017-05-26

## 2017-05-25 RX ORDER — DEXTROSE MONOHYDRATE 25 G/50ML
12.5 INJECTION, SOLUTION INTRAVENOUS PRN
Status: DISCONTINUED | OUTPATIENT
Start: 2017-05-25 | End: 2017-05-27 | Stop reason: HOSPADM

## 2017-05-25 RX ORDER — PANTOPRAZOLE SODIUM 40 MG/1
40 TABLET, DELAYED RELEASE ORAL
Status: DISCONTINUED | OUTPATIENT
Start: 2017-05-26 | End: 2017-05-27 | Stop reason: HOSPADM

## 2017-05-25 RX ADMIN — INSULIN LISPRO 9 UNITS: 100 INJECTION, SOLUTION INTRAVENOUS; SUBCUTANEOUS at 11:48

## 2017-05-25 RX ADMIN — HEPARIN SODIUM 5000 UNITS: 5000 INJECTION, SOLUTION INTRAVENOUS; SUBCUTANEOUS at 05:57

## 2017-05-25 RX ADMIN — HYDRALAZINE HYDROCHLORIDE 100 MG: 50 TABLET, FILM COATED ORAL at 22:56

## 2017-05-25 RX ADMIN — HEPARIN SODIUM 5000 UNITS: 5000 INJECTION, SOLUTION INTRAVENOUS; SUBCUTANEOUS at 22:55

## 2017-05-25 RX ADMIN — SUCRALFATE 1 G: 1 SUSPENSION ORAL at 20:22

## 2017-05-25 RX ADMIN — FAMOTIDINE 20 MG: 20 TABLET ORAL at 08:30

## 2017-05-25 RX ADMIN — HYDRALAZINE HYDROCHLORIDE 100 MG: 50 TABLET, FILM COATED ORAL at 05:57

## 2017-05-25 RX ADMIN — LEVOTHYROXINE SODIUM 137 MCG: 137 TABLET ORAL at 05:57

## 2017-05-25 RX ADMIN — ROPINIROLE HYDROCHLORIDE 2 MG: 2 TABLET, FILM COATED ORAL at 20:22

## 2017-05-25 RX ADMIN — METOCLOPRAMIDE 5 MG: 5 TABLET ORAL at 20:22

## 2017-05-25 RX ADMIN — HEPARIN SODIUM 5000 UNITS: 5000 INJECTION, SOLUTION INTRAVENOUS; SUBCUTANEOUS at 14:35

## 2017-05-25 RX ADMIN — DULOXETINE HYDROCHLORIDE 60 MG: 60 CAPSULE, DELAYED RELEASE ORAL at 08:30

## 2017-05-25 RX ADMIN — INSULIN GLARGINE 5 UNITS: 100 INJECTION, SOLUTION SUBCUTANEOUS at 20:23

## 2017-05-25 RX ADMIN — SUCRALFATE 1 G: 1 SUSPENSION ORAL at 17:44

## 2017-05-25 RX ADMIN — ISOSORBIDE MONONITRATE 120 MG: 60 TABLET, EXTENDED RELEASE ORAL at 08:29

## 2017-05-25 RX ADMIN — CLONIDINE HYDROCHLORIDE 0.1 MG: 0.1 TABLET ORAL at 03:07

## 2017-05-25 RX ADMIN — METOCLOPRAMIDE 5 MG: 5 TABLET ORAL at 08:29

## 2017-05-25 RX ADMIN — ATENOLOL 100 MG: 50 TABLET ORAL at 08:30

## 2017-05-25 RX ADMIN — AMLODIPINE BESYLATE 5 MG: 5 TABLET ORAL at 11:47

## 2017-05-25 RX ADMIN — SIMVASTATIN 40 MG: 40 TABLET, FILM COATED ORAL at 08:29

## 2017-05-25 RX ADMIN — SUCRALFATE 1 G: 1 SUSPENSION ORAL at 11:45

## 2017-05-25 RX ADMIN — ATENOLOL 100 MG: 50 TABLET ORAL at 20:22

## 2017-05-25 RX ADMIN — SUCRALFATE 1 G: 1 SUSPENSION ORAL at 05:57

## 2017-05-25 RX ADMIN — METOCLOPRAMIDE 5 MG: 5 TABLET ORAL at 11:44

## 2017-05-25 RX ADMIN — METOCLOPRAMIDE 5 MG: 5 TABLET ORAL at 17:44

## 2017-05-25 RX ADMIN — PANTOPRAZOLE SODIUM 40 MG: 40 INJECTION, POWDER, FOR SOLUTION INTRAVENOUS at 08:30

## 2017-05-25 RX ADMIN — HYDRALAZINE HYDROCHLORIDE 100 MG: 50 TABLET, FILM COATED ORAL at 14:34

## 2017-05-25 ASSESSMENT — PAIN SCALES - GENERAL
PAINLEVEL_OUTOF10: 0

## 2017-05-26 LAB
BASOPHILS ABSOLUTE: 0 K/UL (ref 0–0.2)
BASOPHILS RELATIVE PERCENT: 0.9 % (ref 0–1)
EOSINOPHILS ABSOLUTE: 0.2 K/UL (ref 0–0.6)
EOSINOPHILS RELATIVE PERCENT: 3.5 % (ref 0–5)
GLUCOSE BLD-MCNC: 101 MG/DL (ref 70–99)
GLUCOSE BLD-MCNC: 234 MG/DL (ref 70–99)
GLUCOSE BLD-MCNC: 285 MG/DL (ref 70–99)
GLUCOSE BLD-MCNC: 306 MG/DL (ref 70–99)
HCT VFR BLD CALC: 25.8 % (ref 37–47)
HEMOGLOBIN: 8 G/DL (ref 12–16)
LYMPHOCYTES ABSOLUTE: 1.5 K/UL (ref 1.1–4.5)
LYMPHOCYTES RELATIVE PERCENT: 32.1 % (ref 20–40)
MAGNESIUM: 2.1 MG/DL (ref 1.6–2.6)
MCH RBC QN AUTO: 28.6 PG (ref 27–31)
MCHC RBC AUTO-ENTMCNC: 31 G/DL (ref 33–37)
MCV RBC AUTO: 92.1 FL (ref 81–99)
MONOCYTES ABSOLUTE: 0.5 K/UL (ref 0–0.9)
MONOCYTES RELATIVE PERCENT: 10.2 % (ref 0–10)
NEUTROPHILS ABSOLUTE: 2.5 K/UL (ref 1.5–7.5)
NEUTROPHILS RELATIVE PERCENT: 53.3 % (ref 50–65)
PDW BLD-RTO: 13.1 % (ref 11.5–14.5)
PERFORMED ON: ABNORMAL
PHOSPHORUS: 4.4 MG/DL (ref 2.5–4.5)
PLATELET # BLD: 110 K/UL (ref 130–400)
PMV BLD AUTO: 10 FL (ref 7.4–10.4)
RBC # BLD: 2.8 M/UL (ref 4.2–5.4)
WBC # BLD: 4.6 K/UL (ref 4.8–10.8)

## 2017-05-26 PROCEDURE — 99232 SBSQ HOSP IP/OBS MODERATE 35: CPT | Performed by: INTERNAL MEDICINE

## 2017-05-26 PROCEDURE — 8010000000 HC HEMODIALYSIS ACUTE INPT

## 2017-05-26 PROCEDURE — 82948 REAGENT STRIP/BLOOD GLUCOSE: CPT

## 2017-05-26 PROCEDURE — 6370000000 HC RX 637 (ALT 250 FOR IP): Performed by: HOSPITALIST

## 2017-05-26 PROCEDURE — 85025 COMPLETE CBC W/AUTO DIFF WBC: CPT

## 2017-05-26 PROCEDURE — 84100 ASSAY OF PHOSPHORUS: CPT

## 2017-05-26 PROCEDURE — 1210000000 HC MED SURG R&B

## 2017-05-26 PROCEDURE — 36415 COLL VENOUS BLD VENIPUNCTURE: CPT

## 2017-05-26 PROCEDURE — 83735 ASSAY OF MAGNESIUM: CPT

## 2017-05-26 PROCEDURE — 6370000000 HC RX 637 (ALT 250 FOR IP): Performed by: INTERNAL MEDICINE

## 2017-05-26 PROCEDURE — 6360000002 HC RX W HCPCS: Performed by: HOSPITALIST

## 2017-05-26 RX ORDER — LISINOPRIL 10 MG/1
10 TABLET ORAL DAILY
Status: DISCONTINUED | OUTPATIENT
Start: 2017-05-26 | End: 2017-05-27 | Stop reason: HOSPADM

## 2017-05-26 RX ORDER — CLONIDINE HYDROCHLORIDE 0.1 MG/1
0.1 TABLET ORAL EVERY 4 HOURS PRN
Status: DISCONTINUED | OUTPATIENT
Start: 2017-05-26 | End: 2017-05-27 | Stop reason: HOSPADM

## 2017-05-26 RX ADMIN — METOCLOPRAMIDE 5 MG: 5 TABLET ORAL at 11:05

## 2017-05-26 RX ADMIN — SUCRALFATE 1 G: 1 SUSPENSION ORAL at 06:00

## 2017-05-26 RX ADMIN — LEVOTHYROXINE SODIUM 137 MCG: 137 TABLET ORAL at 06:00

## 2017-05-26 RX ADMIN — ISOSORBIDE MONONITRATE 120 MG: 60 TABLET, EXTENDED RELEASE ORAL at 11:05

## 2017-05-26 RX ADMIN — HEPARIN SODIUM 5000 UNITS: 5000 INJECTION, SOLUTION INTRAVENOUS; SUBCUTANEOUS at 14:30

## 2017-05-26 RX ADMIN — SUCRALFATE 1 G: 1 SUSPENSION ORAL at 17:24

## 2017-05-26 RX ADMIN — HEPARIN SODIUM 5000 UNITS: 5000 INJECTION, SOLUTION INTRAVENOUS; SUBCUTANEOUS at 21:02

## 2017-05-26 RX ADMIN — HEPARIN SODIUM 5000 UNITS: 5000 INJECTION, SOLUTION INTRAVENOUS; SUBCUTANEOUS at 05:52

## 2017-05-26 RX ADMIN — AMLODIPINE BESYLATE 5 MG: 5 TABLET ORAL at 11:04

## 2017-05-26 RX ADMIN — PANTOPRAZOLE SODIUM 40 MG: 40 TABLET, DELAYED RELEASE ORAL at 06:39

## 2017-05-26 RX ADMIN — LISINOPRIL 10 MG: 10 TABLET ORAL at 11:05

## 2017-05-26 RX ADMIN — ATENOLOL 100 MG: 50 TABLET ORAL at 20:49

## 2017-05-26 RX ADMIN — HYDRALAZINE HYDROCHLORIDE 100 MG: 50 TABLET, FILM COATED ORAL at 05:53

## 2017-05-26 RX ADMIN — HYDRALAZINE HYDROCHLORIDE 100 MG: 50 TABLET, FILM COATED ORAL at 20:49

## 2017-05-26 RX ADMIN — INSULIN LISPRO 5 UNITS: 100 INJECTION, SOLUTION INTRAVENOUS; SUBCUTANEOUS at 17:24

## 2017-05-26 RX ADMIN — METOCLOPRAMIDE 5 MG: 5 TABLET ORAL at 16:30

## 2017-05-26 RX ADMIN — DULOXETINE HYDROCHLORIDE 60 MG: 60 CAPSULE, DELAYED RELEASE ORAL at 11:04

## 2017-05-26 RX ADMIN — SUCRALFATE 1 G: 1 SUSPENSION ORAL at 20:48

## 2017-05-26 RX ADMIN — SUCRALFATE 1 G: 1 SUSPENSION ORAL at 11:14

## 2017-05-26 RX ADMIN — HYDRALAZINE HYDROCHLORIDE 100 MG: 50 TABLET, FILM COATED ORAL at 14:30

## 2017-05-26 RX ADMIN — INSULIN LISPRO 5 UNITS: 100 INJECTION, SOLUTION INTRAVENOUS; SUBCUTANEOUS at 12:09

## 2017-05-26 RX ADMIN — INSULIN GLARGINE 5 UNITS: 100 INJECTION, SOLUTION SUBCUTANEOUS at 20:49

## 2017-05-26 RX ADMIN — ROPINIROLE HYDROCHLORIDE 2 MG: 2 TABLET, FILM COATED ORAL at 20:49

## 2017-05-26 RX ADMIN — CLONIDINE HYDROCHLORIDE 0.1 MG: 0.1 TABLET ORAL at 08:50

## 2017-05-26 RX ADMIN — METOCLOPRAMIDE 5 MG: 5 TABLET ORAL at 20:49

## 2017-05-26 RX ADMIN — ATENOLOL 100 MG: 50 TABLET ORAL at 11:04

## 2017-05-26 RX ADMIN — SIMVASTATIN 40 MG: 40 TABLET, FILM COATED ORAL at 11:04

## 2017-05-26 RX ADMIN — FAMOTIDINE 20 MG: 20 TABLET ORAL at 11:05

## 2017-05-27 VITALS
RESPIRATION RATE: 16 BRPM | OXYGEN SATURATION: 98 % | BODY MASS INDEX: 29.29 KG/M2 | WEIGHT: 182.25 LBS | DIASTOLIC BLOOD PRESSURE: 66 MMHG | TEMPERATURE: 97.6 F | HEIGHT: 66 IN | SYSTOLIC BLOOD PRESSURE: 119 MMHG | HEART RATE: 58 BPM

## 2017-05-27 LAB
ALBUMIN SERPL-MCNC: 3.2 G/DL (ref 3.5–5.2)
ALP BLD-CCNC: 63 U/L (ref 35–104)
ALT SERPL-CCNC: 23 U/L (ref 5–33)
ANION GAP SERPL CALCULATED.3IONS-SCNC: 12 MMOL/L (ref 7–19)
AST SERPL-CCNC: 37 U/L (ref 5–32)
BASOPHILS ABSOLUTE: 0.1 K/UL (ref 0–0.2)
BASOPHILS RELATIVE PERCENT: 1.1 % (ref 0–1)
BILIRUB SERPL-MCNC: <0.2 MG/DL (ref 0.2–1.2)
BUN BLDV-MCNC: 25 MG/DL (ref 6–20)
CALCIUM SERPL-MCNC: 8.6 MG/DL (ref 8.6–10)
CHLORIDE BLD-SCNC: 96 MMOL/L (ref 98–111)
CO2: 28 MMOL/L (ref 22–29)
CREAT SERPL-MCNC: 4.4 MG/DL (ref 0.5–0.9)
EOSINOPHILS ABSOLUTE: 0.2 K/UL (ref 0–0.6)
EOSINOPHILS RELATIVE PERCENT: 3.2 % (ref 0–5)
GFR NON-AFRICAN AMERICAN: 11
GLUCOSE BLD-MCNC: 180 MG/DL (ref 74–109)
GLUCOSE BLD-MCNC: 194 MG/DL (ref 70–99)
GLUCOSE BLD-MCNC: 242 MG/DL (ref 70–99)
HCT VFR BLD CALC: 26 % (ref 37–47)
HEMOGLOBIN: 8 G/DL (ref 12–16)
LYMPHOCYTES ABSOLUTE: 1.4 K/UL (ref 1.1–4.5)
LYMPHOCYTES RELATIVE PERCENT: 29.6 % (ref 20–40)
MCH RBC QN AUTO: 28.7 PG (ref 27–31)
MCHC RBC AUTO-ENTMCNC: 30.8 G/DL (ref 33–37)
MCV RBC AUTO: 93.2 FL (ref 81–99)
MONOCYTES ABSOLUTE: 0.6 K/UL (ref 0–0.9)
MONOCYTES RELATIVE PERCENT: 12.7 % (ref 0–10)
NEUTROPHILS ABSOLUTE: 2.5 K/UL (ref 1.5–7.5)
NEUTROPHILS RELATIVE PERCENT: 53.2 % (ref 50–65)
PDW BLD-RTO: 12.9 % (ref 11.5–14.5)
PERFORMED ON: ABNORMAL
PERFORMED ON: ABNORMAL
PLATELET # BLD: 109 K/UL (ref 130–400)
PMV BLD AUTO: 10.2 FL (ref 7.4–10.4)
POTASSIUM SERPL-SCNC: 4.7 MMOL/L (ref 3.5–5)
RBC # BLD: 2.79 M/UL (ref 4.2–5.4)
SODIUM BLD-SCNC: 136 MMOL/L (ref 136–145)
TOTAL PROTEIN: 5.7 G/DL (ref 6.6–8.7)
WBC # BLD: 4.7 K/UL (ref 4.8–10.8)

## 2017-05-27 PROCEDURE — 6370000000 HC RX 637 (ALT 250 FOR IP): Performed by: HOSPITALIST

## 2017-05-27 PROCEDURE — 6360000002 HC RX W HCPCS: Performed by: HOSPITALIST

## 2017-05-27 PROCEDURE — 6370000000 HC RX 637 (ALT 250 FOR IP): Performed by: INTERNAL MEDICINE

## 2017-05-27 PROCEDURE — 80053 COMPREHEN METABOLIC PANEL: CPT

## 2017-05-27 PROCEDURE — 36415 COLL VENOUS BLD VENIPUNCTURE: CPT

## 2017-05-27 PROCEDURE — 85025 COMPLETE CBC W/AUTO DIFF WBC: CPT

## 2017-05-27 PROCEDURE — 82948 REAGENT STRIP/BLOOD GLUCOSE: CPT

## 2017-05-27 PROCEDURE — 99238 HOSP IP/OBS DSCHRG MGMT 30/<: CPT | Performed by: INTERNAL MEDICINE

## 2017-05-27 RX ORDER — HYDRALAZINE HYDROCHLORIDE 100 MG/1
100 TABLET, FILM COATED ORAL 3 TIMES DAILY
Qty: 90 TABLET | Refills: 0 | Status: SHIPPED | OUTPATIENT
Start: 2017-05-27 | End: 2018-08-08

## 2017-05-27 RX ORDER — ISOSORBIDE MONONITRATE 120 MG/1
120 TABLET, EXTENDED RELEASE ORAL DAILY
Qty: 30 TABLET | Refills: 0 | Status: ON HOLD | OUTPATIENT
Start: 2017-05-27 | End: 2021-11-25 | Stop reason: HOSPADM

## 2017-05-27 RX ORDER — AMLODIPINE BESYLATE 5 MG/1
5 TABLET ORAL DAILY
Qty: 30 TABLET | Refills: 0 | Status: ON HOLD | OUTPATIENT
Start: 2017-05-27 | End: 2018-03-08 | Stop reason: HOSPADM

## 2017-05-27 RX ORDER — LISINOPRIL 10 MG/1
10 TABLET ORAL DAILY
Qty: 30 TABLET | Refills: 0 | Status: SHIPPED | OUTPATIENT
Start: 2017-05-27 | End: 2017-08-14 | Stop reason: CLARIF

## 2017-05-27 RX ADMIN — AMLODIPINE BESYLATE 5 MG: 5 TABLET ORAL at 09:15

## 2017-05-27 RX ADMIN — SIMVASTATIN 40 MG: 40 TABLET, FILM COATED ORAL at 09:15

## 2017-05-27 RX ADMIN — LISINOPRIL 10 MG: 10 TABLET ORAL at 09:16

## 2017-05-27 RX ADMIN — HEPARIN SODIUM 5000 UNITS: 5000 INJECTION, SOLUTION INTRAVENOUS; SUBCUTANEOUS at 05:39

## 2017-05-27 RX ADMIN — ISOSORBIDE MONONITRATE 120 MG: 60 TABLET, EXTENDED RELEASE ORAL at 09:15

## 2017-05-27 RX ADMIN — METOCLOPRAMIDE 5 MG: 5 TABLET ORAL at 09:15

## 2017-05-27 RX ADMIN — HYDRALAZINE HYDROCHLORIDE 100 MG: 50 TABLET, FILM COATED ORAL at 05:39

## 2017-05-27 RX ADMIN — ATENOLOL 100 MG: 50 TABLET ORAL at 09:15

## 2017-05-27 RX ADMIN — SUCRALFATE 1 G: 1 SUSPENSION ORAL at 05:39

## 2017-05-27 RX ADMIN — INSULIN LISPRO 5 UNITS: 100 INJECTION, SOLUTION INTRAVENOUS; SUBCUTANEOUS at 09:17

## 2017-05-27 RX ADMIN — DULOXETINE HYDROCHLORIDE 60 MG: 60 CAPSULE, DELAYED RELEASE ORAL at 09:15

## 2017-05-27 RX ADMIN — LEVOTHYROXINE SODIUM 137 MCG: 137 TABLET ORAL at 05:39

## 2017-05-27 RX ADMIN — FAMOTIDINE 20 MG: 20 TABLET ORAL at 09:15

## 2017-05-27 RX ADMIN — PANTOPRAZOLE SODIUM 40 MG: 40 TABLET, DELAYED RELEASE ORAL at 05:39

## 2017-06-08 ENCOUNTER — HOSPITAL ENCOUNTER (OUTPATIENT)
Age: 47
Setting detail: SPECIMEN
Discharge: HOME | End: 2017-06-08
Payer: COMMERCIAL

## 2017-07-11 ENCOUNTER — HOSPITAL ENCOUNTER (OUTPATIENT)
Age: 47
Setting detail: SPECIMEN
Discharge: HOME | End: 2017-07-11
Payer: COMMERCIAL

## 2017-07-11 LAB — HEMOGLOBIN: 7.4 G/DL (ref 12–16)

## 2017-07-11 PROCEDURE — 36415 COLL VENOUS BLD VENIPUNCTURE: CPT

## 2017-07-11 PROCEDURE — 85018 HEMOGLOBIN: CPT

## 2017-07-12 ENCOUNTER — HOSPITAL ENCOUNTER (OUTPATIENT)
Dept: INFUSION THERAPY | Age: 47
Setting detail: INFUSION SERIES
Discharge: HOME | End: 2017-07-12
Payer: MEDICARE

## 2017-07-12 VITALS
SYSTOLIC BLOOD PRESSURE: 152 MMHG | RESPIRATION RATE: 1 BRPM | WEIGHT: 181.22 LBS | HEIGHT: 66 IN | TEMPERATURE: 97.6 F | BODY MASS INDEX: 29.12 KG/M2 | DIASTOLIC BLOOD PRESSURE: 74 MMHG | HEART RATE: 61 BPM

## 2017-07-12 LAB
ABO/RH: NORMAL
ANTIBODY SCREEN: NORMAL
BLOOD BANK DISPENSE STATUS: NORMAL
BLOOD BANK DISPENSE STATUS: NORMAL
BLOOD BANK PRODUCT CODE: NORMAL
BLOOD BANK PRODUCT CODE: NORMAL
BPU ID: NORMAL
BPU ID: NORMAL
DESCRIPTION BLOOD BANK: NORMAL
DESCRIPTION BLOOD BANK: NORMAL

## 2017-07-12 PROCEDURE — 36430 TRANSFUSION BLD/BLD COMPNT: CPT

## 2017-07-12 PROCEDURE — 6360000002 HC RX W HCPCS: Performed by: CLINICAL NURSE SPECIALIST

## 2017-07-12 PROCEDURE — P9016 RBC LEUKOCYTES REDUCED: HCPCS

## 2017-07-12 PROCEDURE — 86901 BLOOD TYPING SEROLOGIC RH(D): CPT

## 2017-07-12 PROCEDURE — 96375 TX/PRO/DX INJ NEW DRUG ADDON: CPT

## 2017-07-12 PROCEDURE — 86900 BLOOD TYPING SEROLOGIC ABO: CPT

## 2017-07-12 PROCEDURE — 2580000003 HC RX 258: Performed by: CLINICAL NURSE SPECIALIST

## 2017-07-12 PROCEDURE — 96374 THER/PROPH/DIAG INJ IV PUSH: CPT

## 2017-07-12 PROCEDURE — 86850 RBC ANTIBODY SCREEN: CPT

## 2017-07-12 PROCEDURE — 6370000000 HC RX 637 (ALT 250 FOR IP): Performed by: CLINICAL NURSE SPECIALIST

## 2017-07-12 RX ORDER — ACETAMINOPHEN 325 MG/1
650 TABLET ORAL ONCE
Status: COMPLETED | OUTPATIENT
Start: 2017-07-12 | End: 2017-07-12

## 2017-07-12 RX ORDER — 0.9 % SODIUM CHLORIDE 0.9 %
250 INTRAVENOUS SOLUTION INTRAVENOUS ONCE
Status: COMPLETED | OUTPATIENT
Start: 2017-07-12 | End: 2017-07-12

## 2017-07-12 RX ORDER — DIPHENHYDRAMINE HYDROCHLORIDE 50 MG/ML
25 INJECTION INTRAMUSCULAR; INTRAVENOUS ONCE
Status: COMPLETED | OUTPATIENT
Start: 2017-07-12 | End: 2017-07-12

## 2017-07-12 RX ADMIN — DIPHENHYDRAMINE HYDROCHLORIDE 25 MG: 50 INJECTION, SOLUTION INTRAMUSCULAR; INTRAVENOUS at 08:37

## 2017-07-12 RX ADMIN — HYDROCORTISONE SODIUM SUCCINATE 100 MG: 100 INJECTION, POWDER, FOR SOLUTION INTRAMUSCULAR; INTRAVENOUS at 08:36

## 2017-07-12 RX ADMIN — ACETAMINOPHEN 650 MG: 325 TABLET, FILM COATED ORAL at 08:37

## 2017-07-12 RX ADMIN — SODIUM CHLORIDE 250 ML: 9 INJECTION, SOLUTION INTRAVENOUS at 08:37

## 2017-07-12 ASSESSMENT — PAIN SCALES - GENERAL: PAINLEVEL_OUTOF10: 0

## 2017-08-14 ENCOUNTER — OFFICE VISIT (OUTPATIENT)
Dept: OBGYN | Age: 47
End: 2017-08-14
Payer: MEDICARE

## 2017-08-14 VITALS
HEIGHT: 66 IN | WEIGHT: 174 LBS | SYSTOLIC BLOOD PRESSURE: 124 MMHG | DIASTOLIC BLOOD PRESSURE: 70 MMHG | BODY MASS INDEX: 27.97 KG/M2

## 2017-08-14 DIAGNOSIS — N93.9 ABNORMAL VAGINAL BLEEDING: Primary | ICD-10-CM

## 2017-08-14 DIAGNOSIS — Z12.4 SCREENING FOR CERVICAL CANCER: ICD-10-CM

## 2017-08-14 DIAGNOSIS — R10.2 PERINEAL PAIN IN FEMALE: ICD-10-CM

## 2017-08-14 DIAGNOSIS — R10.2 PELVIC PAIN IN FEMALE: ICD-10-CM

## 2017-08-14 DIAGNOSIS — D64.9 ANEMIA, UNSPECIFIED TYPE: ICD-10-CM

## 2017-08-14 DIAGNOSIS — Z80.41 FAMILY HISTORY OF OVARIAN CANCER: ICD-10-CM

## 2017-08-14 PROCEDURE — 4004F PT TOBACCO SCREEN RCVD TLK: CPT | Performed by: NURSE PRACTITIONER

## 2017-08-14 PROCEDURE — G8427 DOCREV CUR MEDS BY ELIG CLIN: HCPCS | Performed by: NURSE PRACTITIONER

## 2017-08-14 PROCEDURE — G0101 CA SCREEN;PELVIC/BREAST EXAM: HCPCS | Performed by: NURSE PRACTITIONER

## 2017-08-14 PROCEDURE — G8419 CALC BMI OUT NRM PARAM NOF/U: HCPCS | Performed by: NURSE PRACTITIONER

## 2017-08-14 PROCEDURE — 99214 OFFICE O/P EST MOD 30 MIN: CPT | Performed by: NURSE PRACTITIONER

## 2017-08-14 RX ORDER — NIFEDIPINE 60 MG/1
60 TABLET, FILM COATED, EXTENDED RELEASE ORAL DAILY
Status: ON HOLD | COMMUNITY
End: 2021-11-25 | Stop reason: HOSPADM

## 2017-08-14 RX ORDER — METOPROLOL TARTRATE 100 MG/1
100 TABLET ORAL 2 TIMES DAILY
Status: ON HOLD | COMMUNITY
End: 2019-04-23 | Stop reason: HOSPADM

## 2017-08-14 RX ORDER — VALSARTAN 320 MG/1
320 TABLET ORAL NIGHTLY
COMMUNITY
End: 2019-01-07 | Stop reason: ALTCHOICE

## 2017-08-14 ASSESSMENT — ENCOUNTER SYMPTOMS
DIARRHEA: 0
SHORTNESS OF BREATH: 0
NAUSEA: 0
TROUBLE SWALLOWING: 0
ABDOMINAL PAIN: 0
WHEEZING: 0
APNEA: 0
CONSTIPATION: 0
SORE THROAT: 0

## 2017-08-16 ENCOUNTER — HOSPITAL ENCOUNTER (OUTPATIENT)
Dept: ULTRASOUND IMAGING | Age: 47
Discharge: HOME | End: 2017-08-16
Payer: MEDICARE

## 2017-08-16 DIAGNOSIS — R10.2 PELVIC PAIN IN FEMALE: ICD-10-CM

## 2017-08-16 DIAGNOSIS — N93.9 ABNORMAL VAGINAL BLEEDING: ICD-10-CM

## 2017-08-16 DIAGNOSIS — R10.2 PERINEAL PAIN IN FEMALE: ICD-10-CM

## 2017-08-16 PROCEDURE — 76817 TRANSVAGINAL US OBSTETRIC: CPT

## 2017-08-22 DIAGNOSIS — Z12.4 SCREENING FOR CERVICAL CANCER: ICD-10-CM

## 2017-08-22 DIAGNOSIS — N93.9 ABNORMAL VAGINAL BLEEDING: ICD-10-CM

## 2017-09-07 ENCOUNTER — HOSPITAL ENCOUNTER (EMERGENCY)
Age: 47
Discharge: HOME | End: 2017-09-07
Attending: EMERGENCY MEDICINE
Payer: MEDICARE

## 2017-09-07 VITALS
WEIGHT: 173 LBS | HEIGHT: 66 IN | DIASTOLIC BLOOD PRESSURE: 84 MMHG | RESPIRATION RATE: 18 BRPM | TEMPERATURE: 98.8 F | SYSTOLIC BLOOD PRESSURE: 152 MMHG | BODY MASS INDEX: 27.8 KG/M2 | HEART RATE: 64 BPM | OXYGEN SATURATION: 94 %

## 2017-09-07 DIAGNOSIS — T82.848A PAIN FROM A/V FISTULA (HCC): Primary | ICD-10-CM

## 2017-09-07 PROCEDURE — 99284 EMERGENCY DEPT VISIT MOD MDM: CPT | Performed by: EMERGENCY MEDICINE

## 2017-09-07 PROCEDURE — 93931 UPPER EXTREMITY STUDY: CPT

## 2017-09-07 PROCEDURE — 99284 EMERGENCY DEPT VISIT MOD MDM: CPT

## 2017-09-07 RX ORDER — HYDROCODONE BITARTRATE AND ACETAMINOPHEN 5; 325 MG/1; MG/1
1 TABLET ORAL ONCE
Status: DISCONTINUED | OUTPATIENT
Start: 2017-09-07 | End: 2017-09-07 | Stop reason: HOSPADM

## 2017-09-07 RX ORDER — HYDROCODONE BITARTRATE AND ACETAMINOPHEN 5; 325 MG/1; MG/1
1 TABLET ORAL EVERY 6 HOURS PRN
Qty: 15 TABLET | Refills: 0 | Status: SHIPPED | OUTPATIENT
Start: 2017-09-07 | End: 2017-09-07

## 2017-09-07 RX ORDER — HYDROCODONE BITARTRATE AND ACETAMINOPHEN 5; 325 MG/1; MG/1
1 TABLET ORAL EVERY 6 HOURS PRN
Qty: 5 TABLET | Refills: 0 | Status: SHIPPED | OUTPATIENT
Start: 2017-09-07 | End: 2017-09-14

## 2017-09-07 ASSESSMENT — ENCOUNTER SYMPTOMS
BACK PAIN: 0
VOMITING: 0
NAUSEA: 0
SORE THROAT: 0
DIARRHEA: 0
ABDOMINAL PAIN: 0
RHINORRHEA: 0
SHORTNESS OF BREATH: 0

## 2017-09-07 ASSESSMENT — PAIN DESCRIPTION - PAIN TYPE: TYPE: ACUTE PAIN

## 2017-09-07 ASSESSMENT — PAIN SCALES - GENERAL
PAINLEVEL_OUTOF10: 8
PAINLEVEL_OUTOF10: 0

## 2017-09-12 ENCOUNTER — OFFICE VISIT (OUTPATIENT)
Dept: GASTROENTEROLOGY | Age: 47
End: 2017-09-12
Payer: MEDICARE

## 2017-09-12 VITALS
DIASTOLIC BLOOD PRESSURE: 62 MMHG | RESPIRATION RATE: 18 BRPM | HEIGHT: 66 IN | OXYGEN SATURATION: 98 % | BODY MASS INDEX: 26.84 KG/M2 | WEIGHT: 167 LBS | HEART RATE: 71 BPM | SYSTOLIC BLOOD PRESSURE: 110 MMHG

## 2017-09-12 DIAGNOSIS — R11.2 NAUSEA AND VOMITING, INTRACTABILITY OF VOMITING NOT SPECIFIED, UNSPECIFIED VOMITING TYPE: ICD-10-CM

## 2017-09-12 DIAGNOSIS — R12 HEARTBURN: Primary | ICD-10-CM

## 2017-09-12 DIAGNOSIS — R13.10 DYSPHAGIA, UNSPECIFIED TYPE: ICD-10-CM

## 2017-09-12 DIAGNOSIS — R10.33 PERIUMBILICAL ABDOMINAL PAIN: ICD-10-CM

## 2017-09-12 DIAGNOSIS — R63.4 WEIGHT LOSS: ICD-10-CM

## 2017-09-12 DIAGNOSIS — K59.00 CONSTIPATION, UNSPECIFIED CONSTIPATION TYPE: ICD-10-CM

## 2017-09-12 RX ORDER — LEVOTHYROXINE SODIUM 0.15 MG/1
150 TABLET ORAL DAILY
COMMUNITY

## 2017-09-12 RX ORDER — CLONIDINE HYDROCHLORIDE 0.1 MG/1
0.1 TABLET ORAL 2 TIMES DAILY PRN
Status: ON HOLD | COMMUNITY
End: 2019-04-02 | Stop reason: HOSPADM

## 2017-09-12 RX ORDER — SENNOSIDES 8.6 MG
TABLET ORAL
COMMUNITY
End: 2019-01-07 | Stop reason: ALTCHOICE

## 2017-09-12 ASSESSMENT — ENCOUNTER SYMPTOMS
RECTAL PAIN: 0
VOICE CHANGE: 0
CONSTIPATION: 1
NAUSEA: 1
DIARRHEA: 0
SORE THROAT: 0
BACK PAIN: 1
ABDOMINAL DISTENTION: 0
COUGH: 0
ABDOMINAL PAIN: 1
SHORTNESS OF BREATH: 0
CHEST TIGHTNESS: 0
VOMITING: 1
TROUBLE SWALLOWING: 1
BLOOD IN STOOL: 0

## 2017-09-25 ENCOUNTER — OFFICE VISIT (OUTPATIENT)
Dept: VASCULAR SURGERY | Age: 47
End: 2017-09-25
Payer: MEDICARE

## 2017-09-25 VITALS
RESPIRATION RATE: 18 BRPM | DIASTOLIC BLOOD PRESSURE: 86 MMHG | HEIGHT: 66 IN | SYSTOLIC BLOOD PRESSURE: 189 MMHG | WEIGHT: 170 LBS | BODY MASS INDEX: 27.32 KG/M2

## 2017-09-25 DIAGNOSIS — Z99.2 ESRD ON HEMODIALYSIS (HCC): Primary | ICD-10-CM

## 2017-09-25 DIAGNOSIS — N18.6 ESRD ON HEMODIALYSIS (HCC): Primary | ICD-10-CM

## 2017-09-25 PROCEDURE — 99203 OFFICE O/P NEW LOW 30 MIN: CPT | Performed by: PHYSICIAN ASSISTANT

## 2017-09-25 PROCEDURE — 4004F PT TOBACCO SCREEN RCVD TLK: CPT | Performed by: PHYSICIAN ASSISTANT

## 2017-09-25 PROCEDURE — G8427 DOCREV CUR MEDS BY ELIG CLIN: HCPCS | Performed by: PHYSICIAN ASSISTANT

## 2017-09-25 PROCEDURE — G8419 CALC BMI OUT NRM PARAM NOF/U: HCPCS | Performed by: PHYSICIAN ASSISTANT

## 2017-09-29 ENCOUNTER — TELEPHONE (OUTPATIENT)
Dept: VASCULAR SURGERY | Age: 47
End: 2017-09-29

## 2017-09-29 ENCOUNTER — PREP FOR PROCEDURE (OUTPATIENT)
Dept: VASCULAR SURGERY | Age: 47
End: 2017-09-29

## 2017-09-29 RX ORDER — ONDANSETRON 2 MG/ML
4 INJECTION INTRAMUSCULAR; INTRAVENOUS EVERY 6 HOURS PRN
Status: CANCELLED | OUTPATIENT
Start: 2017-09-29

## 2017-09-29 RX ORDER — SODIUM CHLORIDE 0.9 % (FLUSH) 0.9 %
10 SYRINGE (ML) INJECTION PRN
Status: CANCELLED | OUTPATIENT
Start: 2017-09-29

## 2017-09-29 RX ORDER — SODIUM CHLORIDE 9 MG/ML
INJECTION, SOLUTION INTRAVENOUS CONTINUOUS
Status: CANCELLED | OUTPATIENT
Start: 2017-09-29

## 2017-09-29 RX ORDER — VANCOMYCIN HYDROCHLORIDE 1 G/200ML
1000 INJECTION, SOLUTION INTRAVENOUS
Status: CANCELLED | OUTPATIENT
Start: 2017-09-29 | End: 2017-09-29

## 2017-09-29 RX ORDER — ASPIRIN 81 MG/1
81 TABLET ORAL ONCE
Status: CANCELLED | OUTPATIENT
Start: 2017-09-29 | End: 2017-09-29

## 2017-10-02 ENCOUNTER — HOSPITAL ENCOUNTER (OUTPATIENT)
Dept: INTERVENTIONAL RADIOLOGY/VASCULAR | Age: 47
Discharge: HOME | End: 2017-10-02
Payer: MEDICARE

## 2017-10-02 VITALS
HEIGHT: 66 IN | SYSTOLIC BLOOD PRESSURE: 150 MMHG | OXYGEN SATURATION: 96 % | RESPIRATION RATE: 12 BRPM | DIASTOLIC BLOOD PRESSURE: 75 MMHG | BODY MASS INDEX: 27.32 KG/M2 | WEIGHT: 170 LBS | TEMPERATURE: 99 F | HEART RATE: 74 BPM

## 2017-10-02 DIAGNOSIS — N18.6 ESRD (END STAGE RENAL DISEASE) (HCC): ICD-10-CM

## 2017-10-02 PROCEDURE — 36902 INTRO CATH DIALYSIS CIRCUIT: CPT | Performed by: SURGERY

## 2017-10-02 PROCEDURE — C1725 CATH, TRANSLUMIN NON-LASER: HCPCS

## 2017-10-02 PROCEDURE — 6370000000 HC RX 637 (ALT 250 FOR IP): Performed by: SURGERY

## 2017-10-02 PROCEDURE — 6360000002 HC RX W HCPCS: Performed by: SURGERY

## 2017-10-02 PROCEDURE — 2500000003 HC RX 250 WO HCPCS: Performed by: SURGERY

## 2017-10-02 PROCEDURE — 6360000004 HC RX CONTRAST MEDICATION: Performed by: SURGERY

## 2017-10-02 PROCEDURE — 2580000003 HC RX 258: Performed by: SURGERY

## 2017-10-02 RX ORDER — ASPIRIN 81 MG/1
81 TABLET ORAL ONCE
Status: COMPLETED | OUTPATIENT
Start: 2017-10-02 | End: 2017-10-02

## 2017-10-02 RX ORDER — VANCOMYCIN HYDROCHLORIDE 1 G/200ML
1000 INJECTION, SOLUTION INTRAVENOUS
Status: COMPLETED | OUTPATIENT
Start: 2017-10-02 | End: 2017-10-02

## 2017-10-02 RX ORDER — ONDANSETRON 2 MG/ML
4 INJECTION INTRAMUSCULAR; INTRAVENOUS EVERY 8 HOURS PRN
Status: DISCONTINUED | OUTPATIENT
Start: 2017-10-02 | End: 2017-10-04 | Stop reason: HOSPADM

## 2017-10-02 RX ORDER — ONDANSETRON 2 MG/ML
4 INJECTION INTRAMUSCULAR; INTRAVENOUS EVERY 6 HOURS PRN
Status: DISCONTINUED | OUTPATIENT
Start: 2017-10-02 | End: 2017-10-02 | Stop reason: SDUPTHER

## 2017-10-02 RX ORDER — SODIUM CHLORIDE 9 MG/ML
INJECTION, SOLUTION INTRAVENOUS CONTINUOUS
Status: DISCONTINUED | OUTPATIENT
Start: 2017-10-02 | End: 2017-10-04 | Stop reason: HOSPADM

## 2017-10-02 RX ORDER — MIDAZOLAM HYDROCHLORIDE 1 MG/ML
INJECTION INTRAMUSCULAR; INTRAVENOUS
Status: COMPLETED | OUTPATIENT
Start: 2017-10-02 | End: 2017-10-02

## 2017-10-02 RX ORDER — ACETAMINOPHEN 325 MG/1
650 TABLET ORAL EVERY 4 HOURS PRN
Status: DISCONTINUED | OUTPATIENT
Start: 2017-10-02 | End: 2017-10-04 | Stop reason: HOSPADM

## 2017-10-02 RX ORDER — HEPARIN SODIUM 5000 [USP'U]/ML
INJECTION, SOLUTION INTRAVENOUS; SUBCUTANEOUS
Status: COMPLETED | OUTPATIENT
Start: 2017-10-02 | End: 2017-10-02

## 2017-10-02 RX ORDER — SODIUM CHLORIDE 0.9 % (FLUSH) 0.9 %
10 SYRINGE (ML) INJECTION PRN
Status: DISCONTINUED | OUTPATIENT
Start: 2017-10-02 | End: 2017-10-04 | Stop reason: HOSPADM

## 2017-10-02 RX ORDER — HYDROCODONE BITARTRATE AND ACETAMINOPHEN 5; 325 MG/1; MG/1
2 TABLET ORAL EVERY 4 HOURS PRN
Status: DISCONTINUED | OUTPATIENT
Start: 2017-10-02 | End: 2017-10-04 | Stop reason: HOSPADM

## 2017-10-02 RX ORDER — FENTANYL CITRATE 50 UG/ML
INJECTION, SOLUTION INTRAMUSCULAR; INTRAVENOUS
Status: COMPLETED | OUTPATIENT
Start: 2017-10-02 | End: 2017-10-02

## 2017-10-02 RX ORDER — LIDOCAINE HYDROCHLORIDE 20 MG/ML
INJECTION, SOLUTION INFILTRATION; PERINEURAL
Status: COMPLETED | OUTPATIENT
Start: 2017-10-02 | End: 2017-10-02

## 2017-10-02 RX ORDER — HYDROCODONE BITARTRATE AND ACETAMINOPHEN 5; 325 MG/1; MG/1
1 TABLET ORAL EVERY 4 HOURS PRN
Status: DISCONTINUED | OUTPATIENT
Start: 2017-10-02 | End: 2017-10-04 | Stop reason: HOSPADM

## 2017-10-02 RX ADMIN — MIDAZOLAM HYDROCHLORIDE 1 MG: 1 INJECTION, SOLUTION INTRAMUSCULAR; INTRAVENOUS at 10:49

## 2017-10-02 RX ADMIN — VANCOMYCIN HYDROCHLORIDE 1000 MG: 1 INJECTION, SOLUTION INTRAVENOUS at 10:19

## 2017-10-02 RX ADMIN — ONDANSETRON 4 MG: 2 INJECTION INTRAMUSCULAR; INTRAVENOUS at 09:35

## 2017-10-02 RX ADMIN — IOVERSOL 25 ML: 678 INJECTION INTRA-ARTERIAL; INTRAVENOUS at 10:57

## 2017-10-02 RX ADMIN — SODIUM CHLORIDE: 9 INJECTION, SOLUTION INTRAVENOUS at 09:23

## 2017-10-02 RX ADMIN — FENTANYL CITRATE 25 MCG: 50 INJECTION INTRAMUSCULAR; INTRAVENOUS at 10:49

## 2017-10-02 RX ADMIN — ASPIRIN 81 MG: 81 TABLET, COATED ORAL at 09:36

## 2017-10-02 RX ADMIN — FENTANYL CITRATE 50 MCG: 50 INJECTION INTRAMUSCULAR; INTRAVENOUS at 10:52

## 2017-10-02 RX ADMIN — HEPARIN SODIUM 5000 UNITS: 5000 INJECTION, SOLUTION INTRAVENOUS; SUBCUTANEOUS at 10:43

## 2017-10-02 RX ADMIN — FENTANYL CITRATE 25 MCG: 50 INJECTION INTRAMUSCULAR; INTRAVENOUS at 10:43

## 2017-10-02 RX ADMIN — LIDOCAINE HYDROCHLORIDE 10 ML: 20 INJECTION, SOLUTION INFILTRATION; PERINEURAL at 10:44

## 2017-10-02 RX ADMIN — MIDAZOLAM HYDROCHLORIDE 1 MG: 1 INJECTION, SOLUTION INTRAMUSCULAR; INTRAVENOUS at 10:43

## 2017-10-02 NOTE — IP AVS SNAPSHOT
After Visit Summary  (Discharge Instructions)    Medication List for Home    Based on the information you provided to us as well as any changes during this visit, the following is your updated medication list.  Compare this with your prescription bottles at home. If you have any questions or concerns, contact your primary care physician's office.              Daily Medication List (This medication list can be shared with any healthcare provider who is helping you manage your medications)      These are medications you told us you were taking at home, CONTINUE taking them after you leave the hospital        Last Dose    Next Dose Due AM NOON PM NIGHT    amLODIPine 5 MG tablet   Commonly known as:  NORVASC   Take 1 tablet by mouth daily                  Take today's dose when patient gets home                          cloNIDine 0.1 MG tablet   Commonly known as:  CATAPRES   Take 0.1 mg by mouth                  Take today after discharge                          DULoxetine 60 MG extended release capsule   Commonly known as:  CYMBALTA   Take 60 mg by mouth daily                  Take when home today                       GLUCOSE MANAGEMENT Tabs   Take by mouth                                         hydrALAZINE 100 MG tablet   Commonly known as:  APRESOLINE   Take 1 tablet by mouth 3 times daily                  Resume afternoon and evening doses today                          insulin detemir 100 UNIT/ML injection pen   Commonly known as:  LEVEMIR FLEXTOUCH   Inject 5 Units into the skin nightly                  Take tonight                          isosorbide mononitrate 120 MG extended release tablet   Commonly known as:  IMDUR   Take 1 tablet by mouth daily                  Take today when home                          levothyroxine 150 MCG tablet   Commonly known as:  SYNTHROID   Take 150 mcg by mouth Daily                  Take today when home if haven't already taken it this morning includes details about your hospital/visit stay along with steps you should take to help with your recovery once you leave the hospital.  In this packet, you will find information about the topics listed below:    · Instructions about your medications including a list of your home medications  · A summary of your hospital visit  · Follow-up appointments once you have left the hospital  · Your care plan at home      You may receive a survey regarding the care you received during your stay. Your input is valuable to us. We encourage you to complete and return your survey in the envelope provided. We hope you will choose us in the future for your healthcare needs. Patient Information     Patient Name KLAUS Gonsalez Nurse 1970      Care Provided at:     Name Address Phone       [address] [phone]            Your Visit    Here you will find information about your visit, including the reason for your visit. Please take this sheet with you when you visit your doctor or other health care provider in the future. It will help determine the best possible medical care for you at that time. If you have any questions once you leave the hospital, please call the department phone number listed below. Why you were here     Your primary diagnosis was:  Not on File      Visit Information     Date & Time Department Dept. Phone    10/2/2017 [facility] [phone]       Follow-up Appointments    Below is a list of your follow-up and future appointments. This may not be a complete list as you may have made appointments directly with providers that we are not aware of or your providers may have made some for you. Please call your providers to confirm appointments. It is important to keep your appointments.  Please bring your current insurance card, photo ID, co-pay, and all medication bottles to your ? Review your discharge instructions provided by your caregivers at discharge    Certain functionality such as prescription refills, scheduling appointments or sending messages to your provider are not activated if your provider does not use [facility] in his/her office    For questions regarding your MyChart account call [phone]. If you have a clinical question, please call your doctor's office. The information on all pages of the After Visit Summary has been reviewed with me, the patient and/or responsible adult, by my health care provider(s). I had the opportunity to ask questions regarding this information. I understand I should dispose of my armband safely at home to protect my health information. A complete copy of the After Visit Summary has been given to me, the patient and/or responsible adult.            Patient Signature/Responsible Adult:____________________    Clinician Signature:_____________________    Date:_____________________    Time:_____________________

## 2017-10-02 NOTE — BRIEF OP NOTE
Brief Postoperative Note    Anisha Apgar  YOB: 1970  [id number]    Pre-operative Diagnosis: ESRD, Increased pressure left brachial cephalic fistula    Post-operative Diagnosis: Same    Procedure: Left upper fistulograms/venograms, Balloon angioplasty cephalic vein arch 47I48 conquest    Anesthesia: MAC and local     Surgeons/Assistants: SHAYNA LOZANO    Estimated Blood Loss: less than 50     Complications: None    Specimens: Was Not Obtained    Findings: Proximal cephalic vein stenosis    Electronically signed by Lesleigh Lesches, MD on 10/2/2017 at 10:59 AM

## 2017-10-02 NOTE — IP AVS SNAPSHOT
Patient Information     Patient Name KLAUS    [facility] 1970         This is your updated medication list to keep with you all times      TAKE these medications     amLODIPine 5 MG tablet   Commonly known as:  NORVASC   Take 1 tablet by mouth daily       cloNIDine 0.1 MG tablet   Commonly known as:  CATAPRES       DULoxetine 60 MG extended release capsule   Commonly known as:  CYMBALTA       GLUCOSE MANAGEMENT Tabs       hydrALAZINE 100 MG tablet   Commonly known as:  APRESOLINE   Take 1 tablet by mouth 3 times daily       insulin detemir 100 UNIT/ML injection pen   Commonly known as:  LEVEMIR FLEXTOUCH   Inject 5 Units into the skin nightly       isosorbide mononitrate 120 MG extended release tablet   Commonly known as:  IMDUR   Take 1 tablet by mouth daily       levothyroxine 150 MCG tablet   Commonly known as:  SYNTHROID       metoprolol 100 MG tablet   Commonly known as:  LOPRESSOR       NIFEdipine 60 MG extended release tablet   Commonly known as:  ADALAT CC       NOVOLOG FLEXPEN 100 UNIT/ML injection pen   Generic drug:  insulin aspart       ondansetron 4 MG tablet   Commonly known as:  ZOFRAN       rOPINIRole 2 MG tablet   Commonly known as:  REQUIP       senna 8.6 MG Tabs tablet   Commonly known as:  SENOKOT       simvastatin 40 MG tablet   Commonly known as:  ZOCOR       STOOL SOFTENER 100 MG capsule   Generic drug:  docusate sodium       valsartan 320 MG tablet   Commonly known as:  DIOVAN       Vitamin D3 57591 units Caps

## 2017-10-20 ENCOUNTER — ANESTHESIA (OUTPATIENT)
Dept: ENDOSCOPY | Age: 47
End: 2017-10-20
Payer: MEDICARE

## 2017-10-20 ENCOUNTER — HOSPITAL ENCOUNTER (OUTPATIENT)
Age: 47
Setting detail: OUTPATIENT SURGERY
Discharge: HOME | End: 2017-10-20
Attending: INTERNAL MEDICINE | Admitting: INTERNAL MEDICINE
Payer: MEDICARE

## 2017-10-20 ENCOUNTER — ANESTHESIA EVENT (OUTPATIENT)
Dept: ENDOSCOPY | Age: 47
End: 2017-10-20
Payer: MEDICARE

## 2017-10-20 VITALS
TEMPERATURE: 98.4 F | RESPIRATION RATE: 18 BRPM | HEART RATE: 61 BPM | DIASTOLIC BLOOD PRESSURE: 86 MMHG | BODY MASS INDEX: 27.32 KG/M2 | HEIGHT: 66 IN | OXYGEN SATURATION: 98 % | SYSTOLIC BLOOD PRESSURE: 183 MMHG | WEIGHT: 170 LBS

## 2017-10-20 VITALS
OXYGEN SATURATION: 99 % | SYSTOLIC BLOOD PRESSURE: 192 MMHG | DIASTOLIC BLOOD PRESSURE: 78 MMHG | RESPIRATION RATE: 18 BRPM

## 2017-10-20 PROBLEM — Z80.0 FAMILY HISTORY OF COLON CANCER: Status: ACTIVE | Noted: 2017-10-20

## 2017-10-20 PROBLEM — R63.4 UNINTENTIONAL WEIGHT LOSS: Status: ACTIVE | Noted: 2017-10-20

## 2017-10-20 LAB
ANION GAP SERPL CALCULATED.3IONS-SCNC: 16 MMOL/L (ref 7–19)
BUN BLDV-MCNC: 31 MG/DL (ref 6–20)
CALCIUM SERPL-MCNC: 9.7 MG/DL (ref 8.6–10)
CHLORIDE BLD-SCNC: 92 MMOL/L (ref 98–111)
CO2: 29 MMOL/L (ref 22–29)
CREAT SERPL-MCNC: 7.3 MG/DL (ref 0.5–0.9)
GFR NON-AFRICAN AMERICAN: 6
GLUCOSE BLD-MCNC: 107 MG/DL (ref 70–99)
GLUCOSE BLD-MCNC: 63 MG/DL (ref 74–109)
GLUCOSE BLD-MCNC: 86 MG/DL (ref 70–99)
PERFORMED ON: ABNORMAL
PERFORMED ON: NORMAL
POTASSIUM SERPL-SCNC: 3.1 MMOL/L (ref 3.5–5)
SODIUM BLD-SCNC: 137 MMOL/L (ref 136–145)

## 2017-10-20 PROCEDURE — 2500000003 HC RX 250 WO HCPCS: Performed by: INTERNAL MEDICINE

## 2017-10-20 PROCEDURE — 80048 BASIC METABOLIC PNL TOTAL CA: CPT

## 2017-10-20 PROCEDURE — 45385 COLONOSCOPY W/LESION REMOVAL: CPT | Performed by: INTERNAL MEDICINE

## 2017-10-20 PROCEDURE — 45380 COLONOSCOPY AND BIOPSY: CPT | Performed by: INTERNAL MEDICINE

## 2017-10-20 PROCEDURE — 2580000003 HC RX 258

## 2017-10-20 PROCEDURE — 3609012400 HC EGD TRANSORAL BIOPSY SINGLE/MULTIPLE: Performed by: INTERNAL MEDICINE

## 2017-10-20 PROCEDURE — 43239 EGD BIOPSY SINGLE/MULTIPLE: CPT | Performed by: INTERNAL MEDICINE

## 2017-10-20 PROCEDURE — 82948 REAGENT STRIP/BLOOD GLUCOSE: CPT

## 2017-10-20 PROCEDURE — 3700000000 HC ANESTHESIA ATTENDED CARE: Performed by: INTERNAL MEDICINE

## 2017-10-20 PROCEDURE — 36415 COLL VENOUS BLD VENIPUNCTURE: CPT

## 2017-10-20 PROCEDURE — 2500000003 HC RX 250 WO HCPCS: Performed by: NURSE ANESTHETIST, CERTIFIED REGISTERED

## 2017-10-20 PROCEDURE — 6360000002 HC RX W HCPCS: Performed by: NURSE ANESTHETIST, CERTIFIED REGISTERED

## 2017-10-20 PROCEDURE — 3609010300 HC COLONOSCOPY W/BIOPSY SINGLE/MULTIPLE: Performed by: INTERNAL MEDICINE

## 2017-10-20 PROCEDURE — C1773 RET DEV, INSERTABLE: HCPCS | Performed by: INTERNAL MEDICINE

## 2017-10-20 PROCEDURE — 88305 TISSUE EXAM BY PATHOLOGIST: CPT

## 2017-10-20 PROCEDURE — 7100000010 HC PHASE II RECOVERY - FIRST 15 MIN: Performed by: INTERNAL MEDICINE

## 2017-10-20 PROCEDURE — 7100000011 HC PHASE II RECOVERY - ADDTL 15 MIN: Performed by: INTERNAL MEDICINE

## 2017-10-20 PROCEDURE — 87077 CULTURE AEROBIC IDENTIFY: CPT

## 2017-10-20 PROCEDURE — 3700000001 HC ADD 15 MINUTES (ANESTHESIA): Performed by: INTERNAL MEDICINE

## 2017-10-20 PROCEDURE — 2580000003 HC RX 258: Performed by: INTERNAL MEDICINE

## 2017-10-20 RX ORDER — BETHANECHOL CHLORIDE 25 MG/1
25 TABLET ORAL
Qty: 120 TABLET | Refills: 3 | Status: SHIPPED | OUTPATIENT
Start: 2017-10-20 | End: 2018-02-06 | Stop reason: ALTCHOICE

## 2017-10-20 RX ORDER — LIDOCAINE HYDROCHLORIDE 10 MG/ML
1 INJECTION, SOLUTION EPIDURAL; INFILTRATION; INTRACAUDAL; PERINEURAL ONCE
Status: COMPLETED | OUTPATIENT
Start: 2017-10-20 | End: 2017-10-20

## 2017-10-20 RX ORDER — SODIUM CHLORIDE 450 MG/100ML
INJECTION, SOLUTION INTRAVENOUS CONTINUOUS
Status: DISCONTINUED | OUTPATIENT
Start: 2017-10-20 | End: 2017-10-20 | Stop reason: HOSPADM

## 2017-10-20 RX ORDER — LIDOCAINE HYDROCHLORIDE 10 MG/ML
INJECTION, SOLUTION EPIDURAL; INFILTRATION; INTRACAUDAL; PERINEURAL PRN
Status: DISCONTINUED | OUTPATIENT
Start: 2017-10-20 | End: 2017-10-20 | Stop reason: SDUPTHER

## 2017-10-20 RX ORDER — DEXTROSE MONOHYDRATE 25 G/50ML
INJECTION, SOLUTION INTRAVENOUS
Status: COMPLETED
Start: 2017-10-20 | End: 2017-10-20

## 2017-10-20 RX ORDER — SODIUM CHLORIDE, SODIUM LACTATE, POTASSIUM CHLORIDE, CALCIUM CHLORIDE 600; 310; 30; 20 MG/100ML; MG/100ML; MG/100ML; MG/100ML
INJECTION, SOLUTION INTRAVENOUS CONTINUOUS
Status: DISCONTINUED | OUTPATIENT
Start: 2017-10-20 | End: 2017-10-20

## 2017-10-20 RX ORDER — DEXTROSE MONOHYDRATE 25 G/50ML
25 INJECTION, SOLUTION INTRAVENOUS PRN
Status: DISCONTINUED | OUTPATIENT
Start: 2017-10-20 | End: 2017-10-20 | Stop reason: HOSPADM

## 2017-10-20 RX ORDER — PROPOFOL 10 MG/ML
INJECTION, EMULSION INTRAVENOUS PRN
Status: DISCONTINUED | OUTPATIENT
Start: 2017-10-20 | End: 2017-10-20 | Stop reason: SDUPTHER

## 2017-10-20 RX ADMIN — PROPOFOL 440 MG: 10 INJECTION, EMULSION INTRAVENOUS at 08:36

## 2017-10-20 RX ADMIN — DEXTROSE MONOHYDRATE 12.5 G: 500 INJECTION PARENTERAL at 07:44

## 2017-10-20 RX ADMIN — LIDOCAINE HYDROCHLORIDE 5 ML: 10 INJECTION, SOLUTION EPIDURAL; INFILTRATION; INTRACAUDAL; PERINEURAL at 08:36

## 2017-10-20 RX ADMIN — SODIUM CHLORIDE: 4.5 INJECTION, SOLUTION INTRAVENOUS at 07:31

## 2017-10-20 RX ADMIN — LIDOCAINE HYDROCHLORIDE 1 ML: 10 INJECTION, SOLUTION EPIDURAL; INFILTRATION; INTRACAUDAL; PERINEURAL at 07:31

## 2017-10-20 RX ADMIN — DEXTROSE MONOHYDRATE 12.5 G: 25 INJECTION, SOLUTION INTRAVENOUS at 07:44

## 2017-10-20 ASSESSMENT — PAIN SCALES - GENERAL
PAINLEVEL_OUTOF10: 0

## 2017-10-20 ASSESSMENT — PAIN - FUNCTIONAL ASSESSMENT: PAIN_FUNCTIONAL_ASSESSMENT: 0-10

## 2017-10-20 NOTE — PROGRESS NOTES
1030- Pt states, \"My sister is working, so we need to call my brother-in-law to pick me up. \"  (The pre-procedure nurse was told by the pt that her sister would be available.)  I called the brother-in-law who says he is at a doctor's appointment and can pick her up after the appointment. The pt has been told that she cannot take the cab home. I have left a message with another person that the brother-in-law told me about and left a message for him. The patient has called another person she knows who is not willing to pick her up. 1059- I reached the sister who says that she has arranged someone to  the pt.

## 2017-10-20 NOTE — ANESTHESIA POSTPROCEDURE EVALUATION
Department of Anesthesiology  Postprocedure Note    Patient: Caren Baugh  MRN: [id number]  YOB: 1970  Date of evaluation: 10/20/2017  Time:  9:06 AM     Procedure Summary     Date:  10/20/17 Room / Location:  [facility] 11 / [facility]    Anesthesia Start:  0827 Anesthesia Stop:      Procedures:       COLONOSCOPY WITH BIOPSY (N/A Abdomen)      EGD BIOPSY (N/A ) Diagnosis:  (CONSTIPATION, WL ,N/V, HEARTBURN)    Surgeon:  Amado Uriarte DO Responsible Provider:  Krzysztof Luis CRNA    Anesthesia Type:  general ASA Status:  4          Anesthesia Type: general    Jane Phase I: Jane Score: 10    Jane Phase II:      Last vitals: Reviewed and per EMR flowsheets.        Anesthesia Post Evaluation    Patient location during evaluation: bedside  Patient participation: complete - patient participated  Level of consciousness: sleepy but conscious  Pain score: 0  Airway patency: patent  Nausea & Vomiting: no nausea and no vomiting  Complications: no  Cardiovascular status: hemodynamically stable and blood pressure returned to baseline  Respiratory status: acceptable and nasal cannula  Hydration status: stable

## 2017-10-20 NOTE — OP NOTE
Post Procedure Note    Name of surgeon / : Clif Hay DO    Date of Service: 10/20/17    Withdrawal Time: >6min    Prep Quality: fair     Pre-operative Diagnosis:   Active Hospital Problems    Diagnosis Date Noted    Family history of colon cancer [Z80.0] 10/20/2017    Unintentional weight loss [R63.4] 10/20/2017    Gastroparesis [K31.84]     Non-intractable vomiting with nausea [R11.2] 11/04/2016    Chronic constipation [K59.09] 11/04/2016       Post-operative Diagnosis/Findings: colon polyps    Procedure: Procedure(s):  COLONOSCOPY WITH  Cold BIOPSY polypectomy and cold snare polypectomy  EGD BIOPSY     Anesthesia: Monitor Anesthesia Care    Surgeons/Assistants: Clif Hay DO    Referring Physician: Maria De Jesus Morejon DO    Procedure Note:  After informed consent was obtained, the patient was placed in the left lateral decubitus position and sedated per MAC. A rectal exam was done which was normal.  The colonoscope was inserted into the rectum and retroflexed which was normal.  The colonoscope was then advanced to the cecum under direct visualization. A polyp was seen in the ascending colon. It was small in size. It was removed via removed by cold snare and retrieved for pathology. The appendiceal orifice and ileocecal valve were identified. The colonoscope was withdrawn. A polyp was seen in the transverse colon. It was dimunitive in size. It was removed via removed by cold biopsy and sent for pathology. .  No abnormalities were discovered. The colonoscope was completely withdrawn. The patient tolerated the procedure. The patient was then positioned for an upper endoscopy. The endoscope was advanced down the oropharynx, down the esophagus, through the gastric lumen, into the duodenum. The small bowel appeared normal.  The gastric antrum was normal.  Antral biopsies were obtained for h. Pylori. The gastric body was normal.  Retroflexion was done which showed a normal fundus. The scope was withdrawn. The GE junction was at 38 cm. It was normal.  The remaining esophagus was normal.  The scope was withdrawn. Estimated Blood Loss: Minimal    Complications: None    Specimens:   ID Type Source Tests Collected by Time Destination   1 : SELENA Tissue Stomach SELENA TEST Kimberli Green, DO 10/20/2017 1195    A : ascending colon polyp Tissue Colon SURGICAL PATHOLOGY Kimberli Green, DO 10/20/2017 5776    B : transverse colon polyp Tissue Colon SURGICAL PATHOLOGY Mercedez Helene, DO 10/20/2017 7680        Discussion: The patient had colon polyps and a fair prep. I would recommend repeat colonoscopy in 3 years. Regarding her EGD, if she is positive for h. Pylori, we will treat. I will start her on bethanechol to improve gastric motility for her n/v.  F/u in clinic in 3-4 weeks.     Mercedez Narayan DO  10/20/17  9:10 AM

## 2017-10-20 NOTE — H&P
Consent:  The risks and benefits of the procedure have been discussed with either the patient or if they cannot consent, their representative. Assessment:  Patient examined and appropriate for planned sedation and procedure. Plan:  Proceed with planned sedation and procedure as above.     Pillo Green DO  8:30 AM

## 2017-10-21 LAB — CLOTEST: NEGATIVE

## 2017-10-23 ENCOUNTER — APPOINTMENT (OUTPATIENT)
Dept: GENERAL RADIOLOGY | Age: 47
End: 2017-10-23
Payer: MEDICARE

## 2017-10-23 ENCOUNTER — HOSPITAL ENCOUNTER (EMERGENCY)
Age: 47
Discharge: HOME | End: 2017-10-23
Payer: MEDICARE

## 2017-10-23 ENCOUNTER — APPOINTMENT (OUTPATIENT)
Dept: CT IMAGING | Age: 47
End: 2017-10-23
Payer: MEDICARE

## 2017-10-23 VITALS
WEIGHT: 170 LBS | OXYGEN SATURATION: 99 % | TEMPERATURE: 99.1 F | RESPIRATION RATE: 18 BRPM | SYSTOLIC BLOOD PRESSURE: 183 MMHG | DIASTOLIC BLOOD PRESSURE: 79 MMHG | HEART RATE: 67 BPM | BODY MASS INDEX: 27.32 KG/M2 | HEIGHT: 66 IN

## 2017-10-23 DIAGNOSIS — R19.7 DIARRHEA, UNSPECIFIED TYPE: ICD-10-CM

## 2017-10-23 DIAGNOSIS — Z98.890 S/P COLONOSCOPY WITH POLYPECTOMY: ICD-10-CM

## 2017-10-23 DIAGNOSIS — R10.84 GENERALIZED ABDOMINAL PAIN: Primary | ICD-10-CM

## 2017-10-23 DIAGNOSIS — N18.6 STAGE 5 CHRONIC KIDNEY DISEASE ON CHRONIC DIALYSIS (HCC): ICD-10-CM

## 2017-10-23 DIAGNOSIS — Z99.2 STAGE 5 CHRONIC KIDNEY DISEASE ON CHRONIC DIALYSIS (HCC): ICD-10-CM

## 2017-10-23 DIAGNOSIS — R11.2 NAUSEA AND VOMITING, INTRACTABILITY OF VOMITING NOT SPECIFIED, UNSPECIFIED VOMITING TYPE: ICD-10-CM

## 2017-10-23 LAB
ALBUMIN SERPL-MCNC: 4.1 G/DL (ref 3.5–5.2)
ALP BLD-CCNC: 98 U/L (ref 35–104)
ALT SERPL-CCNC: 12 U/L (ref 5–33)
ANION GAP SERPL CALCULATED.3IONS-SCNC: 20 MMOL/L (ref 7–19)
AST SERPL-CCNC: 18 U/L (ref 5–32)
BACTERIA: ABNORMAL /HPF
BILIRUB SERPL-MCNC: 0.4 MG/DL (ref 0.2–1.2)
BILIRUBIN URINE: NEGATIVE
BLOOD, URINE: ABNORMAL
BUN BLDV-MCNC: 29 MG/DL (ref 6–20)
CALCIUM SERPL-MCNC: 10 MG/DL (ref 8.6–10)
CHLORIDE BLD-SCNC: 88 MMOL/L (ref 98–111)
CLARITY: ABNORMAL
CO2: 30 MMOL/L (ref 22–29)
COLOR: YELLOW
CREAT SERPL-MCNC: 7.4 MG/DL (ref 0.5–0.9)
EPITHELIAL CELLS, UA: 21 /HPF (ref 0–5)
GFR NON-AFRICAN AMERICAN: 6
GLUCOSE BLD-MCNC: 144 MG/DL (ref 74–109)
GLUCOSE URINE: 250 MG/DL
HCT VFR BLD CALC: 40.7 % (ref 37–47)
HEMOGLOBIN: 13 G/DL (ref 12–16)
HYALINE CASTS: 4 /HPF (ref 0–8)
KETONES, URINE: ABNORMAL MG/DL
LEUKOCYTE ESTERASE, URINE: NEGATIVE
MCH RBC QN AUTO: 30.4 PG (ref 27–31)
MCHC RBC AUTO-ENTMCNC: 31.9 G/DL (ref 33–37)
MCV RBC AUTO: 95.3 FL (ref 81–99)
NITRITE, URINE: NEGATIVE
PDW BLD-RTO: 14.2 % (ref 11.5–14.5)
PH UA: 7.5
PLATELET # BLD: 167 K/UL (ref 130–400)
PMV BLD AUTO: 9.3 FL (ref 9.4–12.3)
POTASSIUM SERPL-SCNC: 3.9 MMOL/L (ref 3.5–5)
PROTEIN UA: >=1000 MG/DL
RBC # BLD: 4.27 M/UL (ref 4.2–5.4)
RBC UA: 13 /HPF (ref 0–4)
SODIUM BLD-SCNC: 138 MMOL/L (ref 136–145)
SPECIFIC GRAVITY UA: 1.02
TOTAL PROTEIN: 7.8 G/DL (ref 6.6–8.7)
UROBILINOGEN, URINE: 0.2 E.U./DL
WBC # BLD: 7.1 K/UL (ref 4.8–10.8)
WBC UA: 13 /HPF (ref 0–5)

## 2017-10-23 PROCEDURE — 85027 COMPLETE CBC AUTOMATED: CPT

## 2017-10-23 PROCEDURE — 81001 URINALYSIS AUTO W/SCOPE: CPT

## 2017-10-23 PROCEDURE — 71010 XR CHEST PORTABLE: CPT

## 2017-10-23 PROCEDURE — 87040 BLOOD CULTURE FOR BACTERIA: CPT

## 2017-10-23 PROCEDURE — 80053 COMPREHEN METABOLIC PANEL: CPT

## 2017-10-23 PROCEDURE — 36415 COLL VENOUS BLD VENIPUNCTURE: CPT

## 2017-10-23 PROCEDURE — 96374 THER/PROPH/DIAG INJ IV PUSH: CPT

## 2017-10-23 PROCEDURE — 96375 TX/PRO/DX INJ NEW DRUG ADDON: CPT

## 2017-10-23 PROCEDURE — 99284 EMERGENCY DEPT VISIT MOD MDM: CPT

## 2017-10-23 PROCEDURE — 96376 TX/PRO/DX INJ SAME DRUG ADON: CPT

## 2017-10-23 PROCEDURE — 74176 CT ABD & PELVIS W/O CONTRAST: CPT

## 2017-10-23 PROCEDURE — 6360000002 HC RX W HCPCS: Performed by: NURSE PRACTITIONER

## 2017-10-23 PROCEDURE — 99284 EMERGENCY DEPT VISIT MOD MDM: CPT | Performed by: FAMILY MEDICINE

## 2017-10-23 RX ORDER — PROMETHAZINE HYDROCHLORIDE 25 MG/1
25 SUPPOSITORY RECTAL EVERY 6 HOURS PRN
Qty: 10 SUPPOSITORY | Refills: 0 | Status: SHIPPED | OUTPATIENT
Start: 2017-10-23 | End: 2017-10-30

## 2017-10-23 RX ORDER — ONDANSETRON 4 MG/1
4 TABLET, ORALLY DISINTEGRATING ORAL ONCE
Status: COMPLETED | OUTPATIENT
Start: 2017-10-23 | End: 2017-10-23

## 2017-10-23 RX ORDER — PROMETHAZINE HYDROCHLORIDE 25 MG/ML
12.5 INJECTION, SOLUTION INTRAMUSCULAR; INTRAVENOUS ONCE
Status: COMPLETED | OUTPATIENT
Start: 2017-10-23 | End: 2017-10-23

## 2017-10-23 RX ADMIN — HYDROMORPHONE HYDROCHLORIDE 1 MG: 1 INJECTION, SOLUTION INTRAMUSCULAR; INTRAVENOUS; SUBCUTANEOUS at 18:17

## 2017-10-23 RX ADMIN — ONDANSETRON 4 MG: 4 TABLET, ORALLY DISINTEGRATING ORAL at 19:24

## 2017-10-23 RX ADMIN — HYDROMORPHONE HYDROCHLORIDE 0.5 MG: 1 INJECTION, SOLUTION INTRAMUSCULAR; INTRAVENOUS; SUBCUTANEOUS at 19:24

## 2017-10-23 RX ADMIN — PROMETHAZINE HYDROCHLORIDE 12.5 MG: 25 INJECTION INTRAMUSCULAR; INTRAVENOUS at 19:57

## 2017-10-23 ASSESSMENT — ENCOUNTER SYMPTOMS
VOMITING: 1
ABDOMINAL PAIN: 1
SHORTNESS OF BREATH: 0
HEMATOCHEZIA: 1
COUGH: 0
HEMATEMESIS: 0
DIARRHEA: 1

## 2017-10-23 ASSESSMENT — PAIN SCALES - GENERAL
PAINLEVEL_OUTOF10: 2
PAINLEVEL_OUTOF10: 6
PAINLEVEL_OUTOF10: 10
PAINLEVEL_OUTOF10: 8

## 2017-10-23 ASSESSMENT — PAIN DESCRIPTION - LOCATION
LOCATION: ABDOMEN
LOCATION: ABDOMEN

## 2017-10-23 ASSESSMENT — PAIN DESCRIPTION - PAIN TYPE
TYPE: ACUTE PAIN
TYPE: ACUTE PAIN

## 2017-10-24 ENCOUNTER — APPOINTMENT (OUTPATIENT)
Dept: GENERAL RADIOLOGY | Age: 47
End: 2017-10-24
Payer: MEDICARE

## 2017-10-24 ENCOUNTER — HOSPITAL ENCOUNTER (EMERGENCY)
Age: 47
Discharge: HOME | End: 2017-10-24
Payer: MEDICARE

## 2017-10-24 VITALS
TEMPERATURE: 98.9 F | SYSTOLIC BLOOD PRESSURE: 173 MMHG | HEART RATE: 74 BPM | OXYGEN SATURATION: 94 % | RESPIRATION RATE: 18 BRPM | DIASTOLIC BLOOD PRESSURE: 77 MMHG

## 2017-10-24 DIAGNOSIS — R89.9 ABNORMAL LABORATORY TEST: Primary | ICD-10-CM

## 2017-10-24 LAB
ALBUMIN SERPL-MCNC: 3.9 G/DL (ref 3.5–5.2)
ALP BLD-CCNC: 97 U/L (ref 35–104)
ALT SERPL-CCNC: 12 U/L (ref 5–33)
ANION GAP SERPL CALCULATED.3IONS-SCNC: 13 MMOL/L (ref 7–19)
AST SERPL-CCNC: 21 U/L (ref 5–32)
BACTERIA: ABNORMAL /HPF
BASOPHILS ABSOLUTE: 0.1 K/UL (ref 0–0.2)
BASOPHILS RELATIVE PERCENT: 1.4 % (ref 0–1)
BILIRUB SERPL-MCNC: 0.4 MG/DL (ref 0.2–1.2)
BILIRUBIN URINE: NEGATIVE
BLOOD, URINE: NEGATIVE
BUN BLDV-MCNC: 9 MG/DL (ref 6–20)
CALCIUM SERPL-MCNC: 8.8 MG/DL (ref 8.6–10)
CASTS: ABNORMAL /LPF
CHLORIDE BLD-SCNC: 91 MMOL/L (ref 98–111)
CLARITY: CLEAR
CO2: 33 MMOL/L (ref 22–29)
COLOR: YELLOW
CREAT SERPL-MCNC: 3.5 MG/DL (ref 0.5–0.9)
EOSINOPHILS ABSOLUTE: 0.1 K/UL (ref 0–0.6)
EOSINOPHILS RELATIVE PERCENT: 2.1 % (ref 0–5)
EPITHELIAL CELLS, UA: 9 /HPF (ref 0–5)
EPITHELIAL CELLS, UA: ABNORMAL /HPF
GFR NON-AFRICAN AMERICAN: 14
GLUCOSE BLD-MCNC: 402 MG/DL (ref 74–109)
GLUCOSE URINE: >=1000 MG/DL
HCT VFR BLD CALC: 35.4 % (ref 37–47)
HEMOGLOBIN: 11.6 G/DL (ref 12–16)
HYALINE CASTS: 5 /HPF (ref 0–8)
KETONES, URINE: NEGATIVE MG/DL
LACTIC ACID: 1 MMOL/L (ref 0.5–1.9)
LEUKOCYTE ESTERASE, URINE: NEGATIVE
LIPASE: 37 U/L (ref 13–60)
LYMPHOCYTES ABSOLUTE: 1.1 K/UL (ref 1.1–4.5)
LYMPHOCYTES RELATIVE PERCENT: 19.3 % (ref 20–40)
MCH RBC QN AUTO: 30.9 PG (ref 27–31)
MCHC RBC AUTO-ENTMCNC: 32.8 G/DL (ref 33–37)
MCV RBC AUTO: 94.4 FL (ref 81–99)
MONOCYTES ABSOLUTE: 0.5 K/UL (ref 0–0.9)
MONOCYTES RELATIVE PERCENT: 8.5 % (ref 0–10)
NEUTROPHILS ABSOLUTE: 4 K/UL (ref 1.5–7.5)
NEUTROPHILS RELATIVE PERCENT: 68.4 % (ref 50–65)
NITRITE, URINE: NEGATIVE
PDW BLD-RTO: 14 % (ref 11.5–14.5)
PH UA: 8
PLATELET # BLD: 164 K/UL (ref 130–400)
PMV BLD AUTO: 9.3 FL (ref 9.4–12.3)
POTASSIUM SERPL-SCNC: 3.4 MMOL/L (ref 3.5–5)
PROTEIN UA: >=1000 MG/DL
RBC # BLD: 3.75 M/UL (ref 4.2–5.4)
RBC UA: 10 /HPF (ref 0–4)
SODIUM BLD-SCNC: 137 MMOL/L (ref 136–145)
SPECIFIC GRAVITY UA: 1.02
TOTAL PROTEIN: 7.3 G/DL (ref 6.6–8.7)
UROBILINOGEN, URINE: 0.2 E.U./DL
WBC # BLD: 5.9 K/UL (ref 4.8–10.8)
WBC UA: 7 /HPF (ref 0–5)
WBC UA: ABNORMAL /HPF (ref 0–5)

## 2017-10-24 PROCEDURE — 71020 XR CHEST STANDARD TWO VW: CPT

## 2017-10-24 PROCEDURE — 99283 EMERGENCY DEPT VISIT LOW MDM: CPT

## 2017-10-24 PROCEDURE — 83690 ASSAY OF LIPASE: CPT

## 2017-10-24 PROCEDURE — 99283 EMERGENCY DEPT VISIT LOW MDM: CPT | Performed by: NURSE PRACTITIONER

## 2017-10-24 PROCEDURE — 80053 COMPREHEN METABOLIC PANEL: CPT

## 2017-10-24 PROCEDURE — 36415 COLL VENOUS BLD VENIPUNCTURE: CPT

## 2017-10-24 PROCEDURE — 85025 COMPLETE CBC W/AUTO DIFF WBC: CPT

## 2017-10-24 PROCEDURE — 83605 ASSAY OF LACTIC ACID: CPT

## 2017-10-24 PROCEDURE — 87086 URINE CULTURE/COLONY COUNT: CPT

## 2017-10-24 PROCEDURE — 87040 BLOOD CULTURE FOR BACTERIA: CPT

## 2017-10-24 PROCEDURE — 81001 URINALYSIS AUTO W/SCOPE: CPT

## 2017-10-24 ASSESSMENT — ENCOUNTER SYMPTOMS
VOMITING: 1
NAUSEA: 1
DIARRHEA: 0
BACK PAIN: 0
ABDOMINAL PAIN: 1
SHORTNESS OF BREATH: 0
WHEEZING: 0
COUGH: 0
SORE THROAT: 0
EYE DISCHARGE: 0

## 2017-10-24 NOTE — ED PROVIDER NOTES
[facility]  eMERGENCY dEPARTMENT eNCOUnter      Pt Name: Lynnette Richards  MRN: [id number]  [place] 1970  Date of evaluation: 10/23/2017  Provider: Ayesha Gruber, [address]       Chief Complaint   Patient presents with    Abdominal Pain     PT to ER complaining of abd pain, diarrhea, nausea, vomiting, rectal bleeding and fever since yesterday. Had endo and colonoscopy 3 days ago. HISTORY OF PRESENT ILLNESS   (Location/Symptom, Timing/Onset, Context/Setting, Quality, Duration, Modifying Factors, Severity)  Note limiting factors. Lynnette Richards is a 49 y.o. female who presents to the emergency department with abd pain and chills. She has also been vomiting. Pt had a colonsocopy and endoscopy on Friday (2 days ago). Pt is a dialysis pt and last has dialysis on sat. She is due for it on tues. Pt also states today she started having bright red blood in her stool        Abdominal Pain   Pain location:  Generalized  Pain quality: aching    Pain severity:  Mild  Onset quality:  Sudden  Duration:  2 days  Timing:  Constant  Context: previous surgery and retching    Associated symptoms: chills, diarrhea, fever, hematochezia and vomiting    Associated symptoms: no chest pain, no cough, no dysuria, no hematemesis and no shortness of breath    Risk factors comment:  Dialysis pt      Nursing Notes were reviewed. REVIEW OF SYSTEMS    (2-9 systems for level 4, 10 or more for level 5)     Review of Systems   Constitutional: Positive for chills, diaphoresis and fever. Respiratory: Negative for cough and shortness of breath. Cardiovascular: Negative for chest pain and leg swelling. Gastrointestinal: Positive for abdominal pain, diarrhea, hematochezia and vomiting. Negative for hematemesis. Genitourinary: Negative for dysuria. Neurological: Positive for weakness. Except as noted above the remainder of the review of systems was reviewed and negative.        PAST MEDICAL HISTORY     Past Medical History:   Diagnosis Date    Arthritis     Chronic kidney disease, stage 5, kidney failure ([facility] [address]     DM (diabetes mellitus) type II controlled with renal manifestation ([facility] [address] 06/1993    Gastroparesis     GERD (gastroesophageal reflux disease)     Hypertension     Hypothyroid     Restless leg syndrome          SURGICAL HISTORY       Past Surgical History:   Procedure Laterality Date    BREAST SURGERY      infected milk gland removed    CHOLECYSTECTOMY      COLONOSCOPY      ENDOMETRIAL ABLATION  2012    GALLBLADDER SURGERY      CA COLONOSCOPY W/BIOPSY SINGLE/MULTIPLE N/A 10/20/2017    Dr Whitaker Mediate prep    CA EGD TRANSORAL BIOPSY SINGLE/MULTIPLE N/A 12/27/2016    Dr Mandi Mcfarlane EGD TRANSORAL BIOPSY SINGLE/MULTIPLE N/A 10/20/2017    Dr Sol Mendoza (-)    TUBAL LIGATION      VASCULAR SURGERY Left 2016    fistula by Dr Bebeto Silvestre at [address]  10/02/2017    [facility]. Left upper fistulograms/venograms, balloon angioplasty cephalic vein arch 38J91 conquest.         CURRENT MEDICATIONS       Discharge Medication List as of 10/23/2017  8:23 PM      CONTINUE these medications which have NOT CHANGED    Details   bethanechol (URECHOLINE) 25 MG tablet Take 1 tablet by mouth 4 times daily (with meals and nightly), Disp-120 tablet, R-3Normal      cloNIDine (CATAPRES) 0.1 MG tablet Take 0.1 mg by mouthHistorical Med      Nutritional Supplements (GLUCOSE MANAGEMENT) TABS Take by mouthHistorical Med      senna (SENOKOT) 8.6 MG TABS tablet Take by mouthHistorical Med      levothyroxine (SYNTHROID) 150 MCG tablet Take 150 mcg by mouth DailyHistorical Med      valsartan (DIOVAN) 320 MG tablet Take 320 mg by mouth dailyHistorical Med      metoprolol (LOPRESSOR) 100 MG tablet Take 100 mg by mouth 2 times dailyHistorical Med      NIFEdipine (ADALAT CC) 60 MG extended release tablet Take 60 mg by mouth dailyHistorical Med      isosorbide mononitrate (IMDUR) 120 MG extended release tablet Take 1 tablet by mouth daily, Disp-30 tablet, R-0Normal      hydrALAZINE (APRESOLINE) 100 MG tablet Take 1 tablet by mouth 3 times daily, Disp-90 tablet, R-0Normal      amLODIPine (NORVASC) 5 MG tablet Take 1 tablet by mouth daily, Disp-30 tablet, R-0Normal      insulin detemir (LEVEMIR FLEXTOUCH) 100 UNIT/ML injection pen Inject 5 Units into the skin nightly, Disp-5 Pen, R-3Normal      ondansetron (ZOFRAN) 4 MG tablet Take 4 mg by mouth every 8 hours as needed for Nausea or Vomiting      DULoxetine (CYMBALTA) 60 MG extended release capsule Take 60 mg by mouth daily      rOPINIRole (REQUIP) 2 MG tablet Take 2 mg by mouth nightly      docusate sodium (STOOL SOFTENER) 100 MG capsule Take 300 mg by mouth nightly Historical Med      simvastatin (ZOCOR) 40 MG tablet Take 40 mg by mouth daily      Cholecalciferol (VITAMIN D3) 50210 UNITS CAPS Take 1 capsule by mouth once a week On Historical Med      insulin aspart (NOVOLOG FLEXPEN) 100 UNIT/ML injection pen Inject 5 Units into the skin 3 times daily             ALLERGIES     Lamisil advanced [terbinafine hcl] and Penicillins    FAMILY HISTORY       Family History   Problem Relation Age of Onset    Colon Cancer Mother       at 61    Lung Cancer Father       at 66    Stomach Cancer Sister     Breast Cancer Sister     Depression Daughter 25     committed suicide          SOCIAL HISTORY       Social History     Social History    Marital status:      Spouse name: N/A    Number of children: 2    Years of education: N/A     Social History Main Topics    Smoking status: Current Every Day Smoker     Packs/day: 0.50     Years: 25.00     Types: Cigarettes     Last attempt to quit: 2017    Smokeless tobacco: Never Used    Alcohol use No    Drug use: No    Sexual activity: Not Asked     Other Topics Concern    None     Social History Narrative    None       SCREENINGS    [place] Coma Scale  Eye Opening: Spontaneous  Best Verbal Response: Oriented  Best Motor Response: Obeys commands  [place] Coma Scale Score: 15        PHYSICAL EXAM    (up to 7 for level 4, 8 or more for level 5)     ED Triage Vitals [10/23/17 1345]   BP Temp Temp Source Pulse Resp SpO2 Height Weight   (!) 197/85 97.4 °F (36.3 °C) Oral 61 18 100 % 5' 6\" (1.676 m) 170 lb (77.1 kg)       Physical Exam   Constitutional: Vital signs are normal. She appears well-developed and well-nourished. She has a sickly appearance. HENT:   Head: Normocephalic and atraumatic. Eyes: Right eye exhibits no discharge. Left eye exhibits no discharge. No scleral icterus. Neck: Normal range of motion. Neck supple. Cardiovascular: Normal rate, S1 normal, S2 normal and normal heart sounds. Pulmonary/Chest: No respiratory distress. Abdominal: Soft. Bowel sounds are normal. She exhibits mass. She exhibits no distension. There is no hepatosplenomegaly. There is generalized tenderness. There is no rigidity and no guarding. No hernia. Genitourinary: Rectal exam shows no external hemorrhoid, no fissure, no mass, no tenderness, anal tone normal and guaiac negative stool. Genitourinary Comments: Thin green stool present guaic neg   Neurological: She is alert. Psychiatric: She has a normal mood and affect. Her behavior is normal.   Nursing note and vitals reviewed. DIAGNOSTIC RESULTS     EKG: All EKG's are interpreted by the Emergency Department Physician who either signs or Co-signs this chart in the absence of a cardiologist.        RADIOLOGY:   Non-plain film images such as CT, Ultrasound and MRI are read by the radiologist. Plain radiographic images are visualized and preliminarily interpreted by the emergency physician with the below findings:      Interpretation per the Radiologist below, if available at the time of this note:    CT ABDOMEN PELVIS WO IV CONTRAST Additional Contrast? None   Final Result   1. Diffuse body wall edema, and mild anasarca changes.  Question third 1. Generalized abdominal pain    2. Nausea and vomiting, intractability of vomiting not specified, unspecified vomiting type    3. Diarrhea, unspecified type    4. Stage 5 chronic kidney disease on chronic dialysis ([facility] [address]    5.  S/P colonoscopy with polypectomy          DISPOSITION/PLAN   DISPOSITION     PATIENT REFERRED TO:  ROMINA Chavis  [address]  [phone]            DISCHARGE MEDICATIONS:  Discharge Medication List as of 10/23/2017  8:23 PM      START taking these medications    Details   promethazine (PHENERGAN) 25 MG suppository Place 1 suppository rectally every 6 hours as needed for Nausea, Disp-10 suppository, R-0Print                (Please note that portions of this note were completed with a voice recognition program.  Efforts were made to edit the dictations but occasionally words are mis-transcribed.)    ROMINA Baer (electronically signed)         ROMINA Baer  10/23/17 8343

## 2017-10-24 NOTE — ED PROVIDER NOTES
Attending Supervising [de-identified] Attestation Statement  The patient is a 49 y.o. female. I have performed a history and physical examination of the patient. I discussed the case with the nurse practitioner. I reviewed the patient's Past Medical History, Past Surgical History, Medications, and Allergies. Physical Exam:  Vitals:    10/23/17 1345 10/23/17 1559 10/23/17 1916   BP: (!) 197/85 (!) 210/82 (!) 201/73   Pulse: 61 63 66   Resp: 18 20 18   Temp: 97.4 °F (36.3 °C) 97.6 °F (36.4 °C)    TempSrc: Oral Temporal    SpO2: 100% 94% 95%   Weight: 170 lb (77.1 kg)     Height: 5' 6\" (1.676 m)         Physical Exam   Constitutional: She is well-developed, well-nourished, and in no distress. Cardiovascular: Normal rate and normal heart sounds. Pulmonary/Chest: Effort normal and breath sounds normal.   Abdominal: Soft. She exhibits no distension and no mass. Bowel sounds are hyperactive. There is tenderness in the epigastric area. There is no rigidity, no rebound, no guarding and no CVA tenderness. Impression/Plan  I reviewed and agree with the findings and plan documented in her note .     Electronically signed by Rema Woods MD on 10/23/17 at 8:07 PM        Kiersten Jim MD  10/23/17 7446

## 2017-10-24 NOTE — ED NOTES
[facility] given [facility] [address], [facility]  10/24/17 1925

## 2017-10-25 LAB
BLOOD CULTURE, ROUTINE: ABNORMAL
BLOOD CULTURE, ROUTINE: ABNORMAL
CULTURE, BLOOD 2: ABNORMAL
CULTURE, BLOOD 2: ABNORMAL
ORGANISM: ABNORMAL
ORGANISM: ABNORMAL

## 2017-10-25 NOTE — ED PROVIDER NOTES
[facility]  eMERGENCY dEPARTMENT eNCOUnter      Pt Name: Rody Martinez  MRN: [id number]  [place] 1970  Date of evaluation: 10/24/2017  Provider: ROMINA Vásquez    CHIEF COMPLAINT     No chief complaint on file. HISTORY OF PRESENT ILLNESS  (Location/Symptom, Timing/Onset, Context/Setting, Quality, Duration, Modifying Factors, Severity.)   Rody Martinez is a 49 y.o. female who presents to the emergency department With chief complaint of abnormal blood cultures. Patient states she was notified by Rupal Ye to return back to the ED for further evaluation she had abnormal blood cultures that were drawn yesterday. Patient tells me she presented to the ED yesterday as she has had continued vomiting, abdominal pain, rectal bleeding, fever and chills. Patient states her abdominal pain along with vomiting has been progressing however this is been present for about 3-4 weeks now. Patient states her fevers have been ongoing for a week however she had chills and rigors yesterday. Patient states she had a recent colonoscopy a couple of days ago and she had some associated rectal bleeding last night as this is what mainly prompted her to present to the ED. Patient has a past medical history hemodialysis she dialyzes on Tuesday, Thursday and Saturday. States she has been on dialysis for about five months related to her diabetes. The history is provided by the patient. Nursing Notes were reviewed and I agree. REVIEW OF SYSTEMS    (2-9 systems for level 4, 10 or more for level 5)     Review of Systems   Constitutional: Positive for activity change, appetite change, chills and fever. HENT: Negative for congestion, ear pain and sore throat. Eyes: Negative for discharge. Respiratory: Negative for cough, shortness of breath and wheezing. Cardiovascular: Negative for chest pain and palpitations. Gastrointestinal: Positive for abdominal pain, nausea and vomiting. Negative for diarrhea. Genitourinary: Negative for dysuria, frequency, hematuria and urgency. Musculoskeletal: Negative for back pain and neck pain. Skin: Negative for rash. Neurological: Negative for dizziness and headaches. Except as noted above the remainder of the review of systems was reviewed and negative. PAST MEDICAL HISTORY     Past Medical History:   Diagnosis Date    Arthritis     Chronic kidney disease, stage 5, kidney failure ([facility] [address]     DM (diabetes mellitus) type II controlled with renal manifestation ([facility] [address] 06/1993    Gastroparesis     GERD (gastroesophageal reflux disease)     Hypertension     Hypothyroid     Restless leg syndrome          SURGICAL HISTORY       Past Surgical History:   Procedure Laterality Date    BREAST SURGERY      infected milk gland removed    CHOLECYSTECTOMY      COLONOSCOPY      ENDOMETRIAL ABLATION  2012    GALLBLADDER SURGERY      [place] COLONOSCOPY W/BIOPSY SINGLE/MULTIPLE N/A 10/20/2017    Dr Calvin Hobbs prep-Tubular AP (-) dysplasia x 2--3 yr recall    [place] EGD TRANSORAL BIOPSY SINGLE/MULTIPLE N/A 12/27/2016    Dr Suzanna Grubbs EGD TRANSORAL BIOPSY SINGLE/MULTIPLE N/A 10/20/2017    Dr Anthony Garcia (-)    TUBAL LIGATION      VASCULAR SURGERY Left 2016    fistula by Dr Crow Watters at [address]  10/02/2017    [facility]. Left upper fistulograms/venograms, balloon angioplasty cephalic vein arch 18P48 conquest.         CURRENT MEDICATIONS       Discharge Medication List as of 10/24/2017  9:15 PM      CONTINUE these medications which have NOT CHANGED    Details   promethazine (PHENERGAN) 25 MG suppository Place 1 suppository rectally every 6 hours as needed for Nausea, Disp-10 suppository, R-0Print      bethanechol (URECHOLINE) 25 MG tablet Take 1 tablet by mouth 4 times daily (with meals and nightly), Disp-120 tablet, R-3Normal      cloNIDine (CATAPRES) 0.1 MG tablet Take 0.1 mg by mouthHistorical Med      Nutritional Supplements (GLUCOSE MANAGEMENT) TABS Take by mouthHistorical Med      senna (SENOKOT) 8.6 MG TABS tablet Take by mouthHistorical Med      levothyroxine (SYNTHROID) 150 MCG tablet Take 150 mcg by mouth DailyHistorical Med      valsartan (DIOVAN) 320 MG tablet Take 320 mg by mouth dailyHistorical Med      metoprolol (LOPRESSOR) 100 MG tablet Take 100 mg by mouth 2 times dailyHistorical Med      NIFEdipine (ADALAT CC) 60 MG extended release tablet Take 60 mg by mouth dailyHistorical Med      isosorbide mononitrate (IMDUR) 120 MG extended release tablet Take 1 tablet by mouth daily, Disp-30 tablet, R-0Normal      hydrALAZINE (APRESOLINE) 100 MG tablet Take 1 tablet by mouth 3 times daily, Disp-90 tablet, R-0Normal      amLODIPine (NORVASC) 5 MG tablet Take 1 tablet by mouth daily, Disp-30 tablet, R-0Normal      insulin detemir (LEVEMIR FLEXTOUCH) 100 UNIT/ML injection pen Inject 5 Units into the skin nightly, Disp-5 Pen, R-3Normal      ondansetron (ZOFRAN) 4 MG tablet Take 4 mg by mouth every 8 hours as needed for Nausea or Vomiting      DULoxetine (CYMBALTA) 60 MG extended release capsule Take 60 mg by mouth daily      rOPINIRole (REQUIP) 2 MG tablet Take 2 mg by mouth nightly      docusate sodium (STOOL SOFTENER) 100 MG capsule Take 300 mg by mouth nightly Historical Med      simvastatin (ZOCOR) 40 MG tablet Take 40 mg by mouth daily      Cholecalciferol (VITAMIN D3) 25303 UNITS CAPS Take 1 capsule by mouth once a week On Historical Med      insulin aspart (NOVOLOG FLEXPEN) 100 UNIT/ML injection pen Inject 5 Units into the skin 3 times daily             ALLERGIES     Lamisil advanced [terbinafine hcl] and Penicillins    FAMILY HISTORY       Family History   Problem Relation Age of Onset    Colon Cancer Mother       at 61    Lung Cancer Father       at 66    Stomach Cancer Sister     Breast Cancer Sister     Depression Daughter 25     committed suicide          SOCIAL HISTORY       Social History     Social History    Marital status:      Spouse name: N/A    Number of children: 2    Years of education: N/A     Social History Main Topics    Smoking status: Current Every Day Smoker     Packs/day: 0.50     Years: 25.00     Types: Cigarettes     Last attempt to quit: 5/12/2017    Smokeless tobacco: Never Used    Alcohol use No    Drug use: No    Sexual activity: Not Asked     Other Topics Concern    None     Social History Narrative    None       SCREENINGS    [place] Coma Scale  Eye Opening: Spontaneous  Best Verbal Response: Oriented  Best Motor Response: Obeys commands  [place]uf Coma Scale Score: 15      PHYSICAL EXAM    (up to 7 for level 4, 8 or more for level 5)     ED Triage Vitals [10/24/17 1825]   BP Temp Temp Source Pulse Resp SpO2 Height Weight   (!) 162/78 97.5 °F (36.4 °C) Oral 76 20 96 % -- --       Physical Exam   Constitutional: She is oriented to person, place, and time. She appears well-developed and well-nourished. No distress. HENT:   Head: Normocephalic and atraumatic. Eyes: Conjunctivae and EOM are normal. Pupils are equal, round, and reactive to light. Left eye exhibits no discharge. Neck: Normal range of motion. Neck supple. Cardiovascular: Normal rate and regular rhythm. No murmur heard. Pulmonary/Chest: Effort normal and breath sounds normal. No respiratory distress. She has no wheezes. Abdominal: Soft. Bowel sounds are normal. She exhibits no distension. There is tenderness (Generalized). Musculoskeletal: Normal range of motion. Arms:  Neurological: She is alert and oriented to person, place, and time. No cranial nerve deficit. Coordination normal.   Skin: Skin is warm and dry. No rash noted. She is not diaphoretic. No erythema. No concerns for any abscess or skin breakdown. Psychiatric: She has a normal mood and affect. Nursing note and vitals reviewed.         DIAGNOSTIC RESULTS     RADIOLOGY:   Non-plain film images such as CT, Ultrasound and MRI are read by the radiologist. some abnormal labs. The patient's blood cultures both showed staph I performed a diagnostic workup as I discussed the case with the ED attending Dr. Yeni Crooks as he actually saw her the night before and examined her. Dr. Yeni Crooks feels that this could possibly be a contaminant as her labs today are umremarkable. Her vital signs are stable she does not appear toxic or septic. Her lactate is normal she does not have an elevated white count her urine is better today than it was yesterday. I discussed with the patient that we have repeated her cultures and we will contact her tomorrow regarding her blood cultures if these are not a contaminant she will need to come in for IV antibiotics and we will need to identify a source if these are contaminant this will explain why she looks so good and we have two positive blood cultures. The patient is agreeable with being discharged tonight as she states she feels fine and she tells me the only reason she came to the ER she was told to come back by one of our providers. The patient tells me about 2-3 weeks ago she had a procedure with her AV fistula as there is some sort of a stenosis and she had a balloon procedure over the fistula looks good there is no erythema however if her blood cultures are positive this could be a source. We will follow up with the blood cultures tomorrow and act accordingly. PROCEDURES:    Procedures      FINAL IMPRESSION      1.  Abnormal laboratory test          DISPOSITION/PLAN   DISPOSITION Decision to Discharge    PATIENT REFERRED TO:  ROMINA [facility]  [phone]      If symptoms worsen    [facility] EMERGENCY DEPT  [facility]  [phone]    If symptoms worsen      DISCHARGE MEDICATIONS:  Discharge Medication List as of 10/24/2017  9:15 PM          (Please note that portions of this note were completed with a voice recognition program.  Efforts were made to edit the

## 2017-10-26 LAB — URINE CULTURE, ROUTINE: NORMAL

## 2017-10-29 LAB
BLOOD CULTURE, ROUTINE: NORMAL
CULTURE, BLOOD 2: NORMAL

## 2017-11-04 ENCOUNTER — APPOINTMENT (OUTPATIENT)
Dept: CT IMAGING | Age: 47
DRG: 304 | End: 2017-11-04
Payer: MEDICARE

## 2017-11-04 ENCOUNTER — APPOINTMENT (OUTPATIENT)
Dept: GENERAL RADIOLOGY | Age: 47
DRG: 304 | End: 2017-11-04
Payer: MEDICARE

## 2017-11-04 ENCOUNTER — HOSPITAL ENCOUNTER (INPATIENT)
Age: 47
LOS: 6 days | Discharge: HOME | DRG: 304 | End: 2017-11-10
Attending: EMERGENCY MEDICINE | Admitting: HOSPITALIST
Payer: MEDICARE

## 2017-11-04 DIAGNOSIS — I16.0 HYPERTENSIVE URGENCY: Primary | ICD-10-CM

## 2017-11-04 DIAGNOSIS — Z99.2 ESRD ON HEMODIALYSIS (HCC): ICD-10-CM

## 2017-11-04 DIAGNOSIS — I10 HYPERTENSION, UNSPECIFIED TYPE: ICD-10-CM

## 2017-11-04 DIAGNOSIS — N18.6 ESRD ON HEMODIALYSIS (HCC): ICD-10-CM

## 2017-11-04 LAB
ALBUMIN SERPL-MCNC: 3.4 G/DL (ref 3.5–5.2)
ALP BLD-CCNC: 97 U/L (ref 35–104)
ALT SERPL-CCNC: 24 U/L (ref 5–33)
ANION GAP SERPL CALCULATED.3IONS-SCNC: 8 MMOL/L (ref 7–19)
AST SERPL-CCNC: 23 U/L (ref 5–32)
BASOPHILS ABSOLUTE: 0.1 K/UL (ref 0–0.2)
BASOPHILS RELATIVE PERCENT: 1.1 % (ref 0–1)
BILIRUB SERPL-MCNC: 0.4 MG/DL (ref 0.2–1.2)
BUN BLDV-MCNC: 11 MG/DL (ref 6–20)
CALCIUM SERPL-MCNC: 9.2 MG/DL (ref 8.6–10)
CHLORIDE BLD-SCNC: 90 MMOL/L (ref 98–111)
CO2: 38 MMOL/L (ref 22–29)
CREAT SERPL-MCNC: 2.5 MG/DL (ref 0.5–0.9)
EOSINOPHILS ABSOLUTE: 0.1 K/UL (ref 0–0.6)
EOSINOPHILS RELATIVE PERCENT: 1.7 % (ref 0–5)
GFR NON-AFRICAN AMERICAN: 20
GLUCOSE BLD-MCNC: 78 MG/DL (ref 74–109)
HCT VFR BLD CALC: 28.4 % (ref 37–47)
HEMOGLOBIN: 9.8 G/DL (ref 12–16)
INR BLD: 1 (ref 0.88–1.18)
LYMPHOCYTES ABSOLUTE: 1.4 K/UL (ref 1.1–4.5)
LYMPHOCYTES RELATIVE PERCENT: 26.7 % (ref 20–40)
MCH RBC QN AUTO: 30.6 PG (ref 27–31)
MCHC RBC AUTO-ENTMCNC: 34.5 G/DL (ref 33–37)
MCV RBC AUTO: 88.8 FL (ref 81–99)
MONOCYTES ABSOLUTE: 0.4 K/UL (ref 0–0.9)
MONOCYTES RELATIVE PERCENT: 6.9 % (ref 0–10)
NEUTROPHILS ABSOLUTE: 3.4 K/UL (ref 1.5–7.5)
NEUTROPHILS RELATIVE PERCENT: 63.4 % (ref 50–65)
PDW BLD-RTO: 12.8 % (ref 11.5–14.5)
PLATELET # BLD: 134 K/UL (ref 130–400)
PMV BLD AUTO: 9.5 FL (ref 9.4–12.3)
POTASSIUM SERPL-SCNC: 3.8 MMOL/L (ref 3.5–5)
PRO-BNP: ABNORMAL PG/ML (ref 0–450)
PROTHROMBIN TIME: 13.1 SEC (ref 12–14.6)
RBC # BLD: 3.2 M/UL (ref 4.2–5.4)
SODIUM BLD-SCNC: 136 MMOL/L (ref 136–145)
TOTAL PROTEIN: 6.7 G/DL (ref 6.6–8.7)
TROPONIN: <0.01 NG/ML (ref 0–0.03)
WBC # BLD: 5.4 K/UL (ref 4.8–10.8)

## 2017-11-04 PROCEDURE — 6370000000 HC RX 637 (ALT 250 FOR IP): Performed by: EMERGENCY MEDICINE

## 2017-11-04 PROCEDURE — 70450 CT HEAD/BRAIN W/O DYE: CPT

## 2017-11-04 PROCEDURE — 6370000000 HC RX 637 (ALT 250 FOR IP): Performed by: INTERNAL MEDICINE

## 2017-11-04 PROCEDURE — 80053 COMPREHEN METABOLIC PANEL: CPT

## 2017-11-04 PROCEDURE — 2000000000 HC ICU R&B

## 2017-11-04 PROCEDURE — 85610 PROTHROMBIN TIME: CPT

## 2017-11-04 PROCEDURE — 2500000003 HC RX 250 WO HCPCS: Performed by: EMERGENCY MEDICINE

## 2017-11-04 PROCEDURE — 2580000003 HC RX 258: Performed by: EMERGENCY MEDICINE

## 2017-11-04 PROCEDURE — 36415 COLL VENOUS BLD VENIPUNCTURE: CPT

## 2017-11-04 PROCEDURE — 6360000002 HC RX W HCPCS: Performed by: INTERNAL MEDICINE

## 2017-11-04 PROCEDURE — 6360000002 HC RX W HCPCS: Performed by: EMERGENCY MEDICINE

## 2017-11-04 PROCEDURE — 84484 ASSAY OF TROPONIN QUANT: CPT

## 2017-11-04 PROCEDURE — 83880 ASSAY OF NATRIURETIC PEPTIDE: CPT

## 2017-11-04 PROCEDURE — 99285 EMERGENCY DEPT VISIT HI MDM: CPT

## 2017-11-04 PROCEDURE — 71020 XR CHEST STANDARD TWO VW: CPT

## 2017-11-04 PROCEDURE — 85025 COMPLETE CBC W/AUTO DIFF WBC: CPT

## 2017-11-04 PROCEDURE — 99291 CRITICAL CARE FIRST HOUR: CPT | Performed by: EMERGENCY MEDICINE

## 2017-11-04 PROCEDURE — 93005 ELECTROCARDIOGRAM TRACING: CPT

## 2017-11-04 PROCEDURE — 99223 1ST HOSP IP/OBS HIGH 75: CPT | Performed by: INTERNAL MEDICINE

## 2017-11-04 RX ORDER — MORPHINE SULFATE 10 MG/ML
4 INJECTION, SOLUTION INTRAMUSCULAR; INTRAVENOUS ONCE
Status: COMPLETED | OUTPATIENT
Start: 2017-11-04 | End: 2017-11-04

## 2017-11-04 RX ORDER — LABETALOL HYDROCHLORIDE 5 MG/ML
10 INJECTION, SOLUTION INTRAVENOUS ONCE
Status: COMPLETED | OUTPATIENT
Start: 2017-11-04 | End: 2017-11-04

## 2017-11-04 RX ORDER — ONDANSETRON 2 MG/ML
4 INJECTION INTRAMUSCULAR; INTRAVENOUS ONCE
Status: COMPLETED | OUTPATIENT
Start: 2017-11-04 | End: 2017-11-04

## 2017-11-04 RX ORDER — ONDANSETRON 2 MG/ML
4 INJECTION INTRAMUSCULAR; INTRAVENOUS EVERY 6 HOURS PRN
Status: DISCONTINUED | OUTPATIENT
Start: 2017-11-04 | End: 2017-11-10 | Stop reason: HOSPADM

## 2017-11-04 RX ORDER — SODIUM CHLORIDE 0.9 % (FLUSH) 0.9 %
10 SYRINGE (ML) INJECTION PRN
Status: DISCONTINUED | OUTPATIENT
Start: 2017-11-04 | End: 2017-11-10 | Stop reason: HOSPADM

## 2017-11-04 RX ORDER — NICOTINE 21 MG/24HR
1 PATCH, TRANSDERMAL 24 HOURS TRANSDERMAL DAILY
Status: DISCONTINUED | OUTPATIENT
Start: 2017-11-04 | End: 2017-11-10 | Stop reason: HOSPADM

## 2017-11-04 RX ORDER — ACETAMINOPHEN 325 MG/1
650 TABLET ORAL EVERY 4 HOURS PRN
Status: DISCONTINUED | OUTPATIENT
Start: 2017-11-04 | End: 2017-11-10 | Stop reason: HOSPADM

## 2017-11-04 RX ORDER — ACETAMINOPHEN 325 MG/1
650 TABLET ORAL ONCE
Status: COMPLETED | OUTPATIENT
Start: 2017-11-04 | End: 2017-11-04

## 2017-11-04 RX ORDER — SODIUM CHLORIDE 0.9 % (FLUSH) 0.9 %
10 SYRINGE (ML) INJECTION EVERY 12 HOURS SCHEDULED
Status: DISCONTINUED | OUTPATIENT
Start: 2017-11-04 | End: 2017-11-10 | Stop reason: HOSPADM

## 2017-11-04 RX ORDER — NITROGLYCERIN 0.4 MG/1
0.4 TABLET SUBLINGUAL EVERY 5 MIN PRN
Status: DISCONTINUED | OUTPATIENT
Start: 2017-11-04 | End: 2017-11-10 | Stop reason: HOSPADM

## 2017-11-04 RX ORDER — PROMETHAZINE HYDROCHLORIDE 25 MG/ML
12.5 INJECTION, SOLUTION INTRAMUSCULAR; INTRAVENOUS ONCE
Status: COMPLETED | OUTPATIENT
Start: 2017-11-04 | End: 2017-11-04

## 2017-11-04 RX ADMIN — LABETALOL HYDROCHLORIDE 10 MG: 5 INJECTION INTRAVENOUS at 14:23

## 2017-11-04 RX ADMIN — PROMETHAZINE HYDROCHLORIDE 12.5 MG: 25 INJECTION INTRAMUSCULAR; INTRAVENOUS at 14:41

## 2017-11-04 RX ADMIN — DEXTROSE MONOHYDRATE 3 MG/HR: 50 INJECTION, SOLUTION INTRAVENOUS at 14:59

## 2017-11-04 RX ADMIN — ENOXAPARIN SODIUM 30 MG: 100 INJECTION SUBCUTANEOUS at 18:09

## 2017-11-04 RX ADMIN — ONDANSETRON 4 MG: 2 INJECTION INTRAMUSCULAR; INTRAVENOUS at 13:48

## 2017-11-04 RX ADMIN — NITROGLYCERIN 0.4 MG: 0.4 TABLET SUBLINGUAL at 13:15

## 2017-11-04 RX ADMIN — MORPHINE SULFATE 4 MG: 10 INJECTION, SOLUTION INTRAMUSCULAR; INTRAVENOUS at 13:48

## 2017-11-04 RX ADMIN — MORPHINE SULFATE 4 MG: 10 INJECTION, SOLUTION INTRAMUSCULAR; INTRAVENOUS at 15:38

## 2017-11-04 RX ADMIN — ACETAMINOPHEN 650 MG: 325 TABLET, FILM COATED ORAL at 13:48

## 2017-11-04 ASSESSMENT — PAIN DESCRIPTION - LOCATION
LOCATION: HEAD
LOCATION: HEAD

## 2017-11-04 ASSESSMENT — PAIN SCALES - GENERAL
PAINLEVEL_OUTOF10: 3
PAINLEVEL_OUTOF10: 10
PAINLEVEL_OUTOF10: 8
PAINLEVEL_OUTOF10: 8

## 2017-11-04 ASSESSMENT — ENCOUNTER SYMPTOMS
NAUSEA: 1
VOMITING: 1
COUGH: 0
SHORTNESS OF BREATH: 0
ABDOMINAL PAIN: 0

## 2017-11-04 ASSESSMENT — PAIN DESCRIPTION - PAIN TYPE
TYPE: ACUTE PAIN
TYPE: ACUTE PAIN

## 2017-11-04 NOTE — H&P
November 4, 2017   1700    History and physical    Chief complaint: Headache, high blood pressure    HPI: The patient is a 40-year-old female who was sent to the emergency room today from dialysis unit because of headache and elevated blood pressure. Patient underwent her usual hemodialysis today which was completed with removal of approximately 5 kg fluid. The patient was noted to have high blood pressure and she complained of a severe occipital headache. In the emergency room blood pressure was as high as 228/95. A CT of the head was performed which showed no acute abnormalities. The patient denies any other symptoms such as chest pain or confusion. She did feel little shortness of breath earlier but none now. She has had no focal numbness or weakness. She has no other complaints. In the emergency room patient was started on Cardene drip and current blood pressure is    Past medical history: End-stage renal disease, hypertension, diabetes mellitus type II, restless leg syndrome, GERD    Past surgical history: Breast surgery for mastoiditis, cholecystectomy, colonoscopy, endometrial ablation, EGD, tubal ligation, fistula formation for hemodialysis    Social history: . Smokes one half pack cigarettes per day. No alcohol or illicit drug use.     Family history: Colon cancer, lung cancer, stomach cancer, breast cancer, depression    Review of systems:  Gen.: No recent fever, chills, weight loss or night sweats  HENT: No recent headaches, no recent ear pain or change in hearing, no recent epistaxis or sinus congestion, no recent sores in mouth/ throat or difficulty swallowing  Eyes: No recent eye pain or change in vision  Neck: No swelling or tenderness  Pulmonary: No recent shortness of breath or productive cough  Cardiac: No recent chest pains, palpitations or peripheral edema  Gastrointestinal: No recent abdominal pain, nausea, vomiting, or diarrhea  Neurologic: No recent focal numbness or weakness  Endocrine: No recent polyuria, polydipsia or heat/cold intolerance  Musculoskeletal: No recent joint inflammation. No new arthralgia or myalgia  Psychiatric: No recent feelings of depression or anxiety  Dermatologic: No recent rashes. No nonhealing lesions  Genitourinary: No recent dysuria or change in urine stream.    Physical exam:  VS: T98.0, RR12, P76, EP190/89, O2 saturation 100% on 2 L  Gen. : Female  in bed. In no distress  HENT: Normocephalic. External ears normal clear canals. External nose normal with clear nares. Oral throat mucosa normal  Eyes: Pupils equal, round and reactive to light. Conjunctiva and lids normal.  Neck: Supple. No jugular venous distention or thyromegaly. Pulmonary: Lungs clear bilaterally. Nonlabored breathing. Cardiovascular: Heart regular without murmur. No peripheral edema. Palpable pulses throughout. Gastrointestinal: Abdomen is soft, nondistended, nontender and without organomegaly or masses. Lymphatics: No adenopathy of the axilla or groins  Skin: Warm and dry. No significant lesions. Musculoskeletal: No joint inflammation. No significant muscle atrophy. Psychiatric: Calm with normal behavior  Neurologic: Alert. Cranial nerves III through XII are intact. No focal motor deficits. No focal deficit of light touch. Genitourinary: Deferred due to patient's condition    Laboratories: Electrolytes normal. Serum CO2 38. Pro BNP 35,000. Troponin less than 0.01. Glucose 78. White blood cell count 5400. In the globe and 9.8. Chest x-ray no active disease    Assessment and plan:    Hypertensive urgency: With peak blood pressure apparently being 228/95 with mean arterial pressure of 139 short-term goal will be to lower the blood pressure to around 180/75 with mean arterial pressure of 100-110. Over the next 24 hours we will attempt to control the patient's blood pressure further.  Patient will be continued on her usual antihypertensive use Cardene drip will be

## 2017-11-04 NOTE — ED NOTES
Assessment:  Airway intact. Pt respirations even and unlabored, skin color within normal limits. Skin is warm and dry. Capillary refill less than 2 seconds. Bowel sounds within normal limits. Abdomen soft and nontender. Alert and oriented x 4. Pt is in no acute distress. C/o HA, PERRLA    Call light within reach, pt gowned. Pt on monitor and alarms on.      Sue Jain RN  11/04/17 2509

## 2017-11-04 NOTE — ED PROVIDER NOTES
CLONIDINE (CATAPRES) 0.1 MG TABLET    Take 0.1 mg by mouth    DOCUSATE SODIUM (STOOL SOFTENER) 100 MG CAPSULE    Take 300 mg by mouth nightly     DULOXETINE (CYMBALTA) 60 MG EXTENDED RELEASE CAPSULE    Take 60 mg by mouth daily    HYDRALAZINE (APRESOLINE) 100 MG TABLET    Take 1 tablet by mouth 3 times daily    INSULIN ASPART (NOVOLOG FLEXPEN) 100 UNIT/ML INJECTION PEN    Inject 5 Units into the skin 3 times daily    INSULIN DETEMIR (LEVEMIR FLEXTOUCH) 100 UNIT/ML INJECTION PEN    Inject 5 Units into the skin nightly    ISOSORBIDE MONONITRATE (IMDUR) 120 MG EXTENDED RELEASE TABLET    Take 1 tablet by mouth daily    LEVOTHYROXINE (SYNTHROID) 150 MCG TABLET    Take 150 mcg by mouth Daily    METOPROLOL (LOPRESSOR) 100 MG TABLET    Take 100 mg by mouth 2 times daily    NIFEDIPINE (ADALAT CC) 60 MG EXTENDED RELEASE TABLET    Take 60 mg by mouth daily    NUTRITIONAL SUPPLEMENTS (GLUCOSE MANAGEMENT) TABS    Take by mouth    ONDANSETRON (ZOFRAN) 4 MG TABLET    Take 4 mg by mouth every 8 hours as needed for Nausea or Vomiting    ROPINIROLE (REQUIP) 2 MG TABLET    Take 2 mg by mouth nightly    SENNA (SENOKOT) 8.6 MG TABS TABLET    Take by mouth    SIMVASTATIN (ZOCOR) 40 MG TABLET    Take 40 mg by mouth daily    VALSARTAN (DIOVAN) 320 MG TABLET    Take 320 mg by mouth daily       ALLERGIES     Lamisil advanced [terbinafine hcl] and Penicillins    FAMILY HISTORY       Family History   Problem Relation Age of Onset    Colon Cancer Mother       at 61    Lung Cancer Father       at 66    Stomach Cancer Sister     Breast Cancer Sister     Depression Daughter 25     committed suicide          SOCIAL HISTORY       Social History     Social History    Marital status:      Spouse name: N/A    Number of children: 2    Years of education: N/A     Social History Main Topics    Smoking status: Current Every Day Smoker     Packs/day: 0.50     Years: 25.00     Types: Cigarettes     Last attempt to quit: 2017    Smokeless tobacco: Never Used    Alcohol use No    Drug use: No    Sexual activity: Not Asked     Other Topics Concern    None     Social History Narrative    None       SCREENINGS             PHYSICAL EXAM    (up to 7 for level 4, 8 or more for level 5)     ED Triage Vitals [11/04/17 1149]   BP Temp Temp Source Pulse Resp SpO2 Height Weight   (!) 190/85 98.4 °F (36.9 °C) Oral 71 20 98 % 5' 6\" (1.676 m) 170 lb (77.1 kg)       Physical Exam   Constitutional: She is oriented to person, place, and time. She appears well-developed and well-nourished. Uncomfortable nauseated and vomited once   HENT:   Head: Normocephalic and atraumatic. Eyes: EOM are normal. Pupils are equal, round, and reactive to light. Neck: Normal range of motion. Neck supple. Cardiovascular: Normal rate, regular rhythm and normal heart sounds. Pulmonary/Chest: Effort normal and breath sounds normal. No respiratory distress. Abdominal: Soft. There is no tenderness. Musculoskeletal: She exhibits no tenderness or deformity. Neurological: She is alert and oriented to person, place, and time. No cranial nerve deficit or sensory deficit. She exhibits normal muscle tone. GCS eye subscore is 4. GCS verbal subscore is 5. GCS motor subscore is 6. Skin: Skin is warm and dry. Psychiatric: She has a normal mood and affect. Her behavior is normal.   Nursing note and vitals reviewed. DIAGNOSTIC RESULTS     EKG: All EKG's are interpreted by the Emergency Department Physician who either signs or Co-signs this chart in the absence of a cardiologist.    EKG sinus rhythm no acute changes. RADIOLOGY:   Non-plain film images such as CT, Ultrasound and MRI are read by the radiologist. Plain radiographic images are visualized and preliminarily interpreted by the emergency physician with the below findings:        Interpretation per the Radiologist below, if available at the time of this note:    XR CHEST STANDARD (2 VW)   Final Result   1. Prescriptions    No medications on file          (Please note that portions of this note were completed with a voice recognition program.  Efforts were made to edit the dictations but occasionally words are mis-transcribed.)    Delma Chow MD (electronically signed)  Attending Emergency Physician         Delma Chow MD  17 Professor Craig Womack MD  [phone]

## 2017-11-05 LAB
ANION GAP SERPL CALCULATED.3IONS-SCNC: 9 MMOL/L (ref 7–19)
BASOPHILS ABSOLUTE: 0.1 K/UL (ref 0–0.2)
BASOPHILS RELATIVE PERCENT: 1.1 % (ref 0–1)
BUN BLDV-MCNC: 14 MG/DL (ref 6–20)
CALCIUM SERPL-MCNC: 8.9 MG/DL (ref 8.6–10)
CHLORIDE BLD-SCNC: 87 MMOL/L (ref 98–111)
CO2: 37 MMOL/L (ref 22–29)
CREAT SERPL-MCNC: 4 MG/DL (ref 0.5–0.9)
EOSINOPHILS ABSOLUTE: 0.1 K/UL (ref 0–0.6)
EOSINOPHILS RELATIVE PERCENT: 3 % (ref 0–5)
GFR NON-AFRICAN AMERICAN: 12
GLUCOSE BLD-MCNC: 193 MG/DL (ref 70–99)
GLUCOSE BLD-MCNC: 237 MG/DL (ref 70–99)
GLUCOSE BLD-MCNC: 240 MG/DL (ref 70–99)
GLUCOSE BLD-MCNC: 359 MG/DL (ref 74–109)
GLUCOSE BLD-MCNC: 369 MG/DL (ref 70–99)
GLUCOSE BLD-MCNC: 376 MG/DL (ref 70–99)
HCT VFR BLD CALC: 29.3 % (ref 37–47)
HEMOGLOBIN: 9.6 G/DL (ref 12–16)
LYMPHOCYTES ABSOLUTE: 1.4 K/UL (ref 1.1–4.5)
LYMPHOCYTES RELATIVE PERCENT: 29.9 % (ref 20–40)
MCH RBC QN AUTO: 31.1 PG (ref 27–31)
MCHC RBC AUTO-ENTMCNC: 32.8 G/DL (ref 33–37)
MCV RBC AUTO: 94.8 FL (ref 81–99)
MONOCYTES ABSOLUTE: 0.3 K/UL (ref 0–0.9)
MONOCYTES RELATIVE PERCENT: 6.6 % (ref 0–10)
NEUTROPHILS ABSOLUTE: 2.8 K/UL (ref 1.5–7.5)
NEUTROPHILS RELATIVE PERCENT: 59.2 % (ref 50–65)
PDW BLD-RTO: 13.3 % (ref 11.5–14.5)
PERFORMED ON: ABNORMAL
PLATELET # BLD: 117 K/UL (ref 130–400)
PMV BLD AUTO: 9.2 FL (ref 9.4–12.3)
POTASSIUM SERPL-SCNC: 4.8 MMOL/L (ref 3.5–5)
RBC # BLD: 3.09 M/UL (ref 4.2–5.4)
SODIUM BLD-SCNC: 133 MMOL/L (ref 136–145)
WBC # BLD: 4.7 K/UL (ref 4.8–10.8)

## 2017-11-05 PROCEDURE — 80048 BASIC METABOLIC PNL TOTAL CA: CPT

## 2017-11-05 PROCEDURE — 85025 COMPLETE CBC W/AUTO DIFF WBC: CPT

## 2017-11-05 PROCEDURE — 6370000000 HC RX 637 (ALT 250 FOR IP): Performed by: INTERNAL MEDICINE

## 2017-11-05 PROCEDURE — 82948 REAGENT STRIP/BLOOD GLUCOSE: CPT

## 2017-11-05 PROCEDURE — 36415 COLL VENOUS BLD VENIPUNCTURE: CPT

## 2017-11-05 PROCEDURE — 2500000003 HC RX 250 WO HCPCS: Performed by: INTERNAL MEDICINE

## 2017-11-05 PROCEDURE — 99232 SBSQ HOSP IP/OBS MODERATE 35: CPT | Performed by: INTERNAL MEDICINE

## 2017-11-05 PROCEDURE — 2000000000 HC ICU R&B

## 2017-11-05 PROCEDURE — 2580000003 HC RX 258: Performed by: INTERNAL MEDICINE

## 2017-11-05 PROCEDURE — 6360000002 HC RX W HCPCS: Performed by: INTERNAL MEDICINE

## 2017-11-05 RX ORDER — HYDRALAZINE HYDROCHLORIDE 50 MG/1
100 TABLET, FILM COATED ORAL 3 TIMES DAILY
Status: DISCONTINUED | OUTPATIENT
Start: 2017-11-05 | End: 2017-11-08

## 2017-11-05 RX ORDER — BETHANECHOL CHLORIDE 25 MG/1
25 TABLET ORAL
Status: DISCONTINUED | OUTPATIENT
Start: 2017-11-05 | End: 2017-11-06

## 2017-11-05 RX ORDER — VALSARTAN 160 MG/1
320 TABLET ORAL DAILY
Status: DISCONTINUED | OUTPATIENT
Start: 2017-11-05 | End: 2017-11-08

## 2017-11-05 RX ORDER — METOPROLOL TARTRATE 50 MG/1
100 TABLET, FILM COATED ORAL 2 TIMES DAILY
Status: DISCONTINUED | OUTPATIENT
Start: 2017-11-05 | End: 2017-11-08

## 2017-11-05 RX ORDER — FENTANYL CITRATE 50 UG/ML
25 INJECTION, SOLUTION INTRAMUSCULAR; INTRAVENOUS ONCE
Status: COMPLETED | OUTPATIENT
Start: 2017-11-05 | End: 2017-11-05

## 2017-11-05 RX ORDER — DOCUSATE SODIUM 100 MG/1
300 CAPSULE, LIQUID FILLED ORAL NIGHTLY
Status: DISCONTINUED | OUTPATIENT
Start: 2017-11-05 | End: 2017-11-08

## 2017-11-05 RX ORDER — ISOSORBIDE MONONITRATE 60 MG/1
120 TABLET, EXTENDED RELEASE ORAL DAILY
Status: DISCONTINUED | OUTPATIENT
Start: 2017-11-05 | End: 2017-11-08

## 2017-11-05 RX ORDER — NIFEDIPINE 60 MG/1
60 TABLET, EXTENDED RELEASE ORAL DAILY
Status: DISCONTINUED | OUTPATIENT
Start: 2017-11-05 | End: 2017-11-08

## 2017-11-05 RX ORDER — CLONIDINE HYDROCHLORIDE 0.1 MG/1
0.1 TABLET ORAL 2 TIMES DAILY
Status: DISCONTINUED | OUTPATIENT
Start: 2017-11-05 | End: 2017-11-08

## 2017-11-05 RX ORDER — INSULIN GLARGINE 100 [IU]/ML
5 INJECTION, SOLUTION SUBCUTANEOUS NIGHTLY
Status: DISCONTINUED | OUTPATIENT
Start: 2017-11-05 | End: 2017-11-08

## 2017-11-05 RX ORDER — NIFEDIPINE 60 MG/1
60 TABLET, FILM COATED, EXTENDED RELEASE ORAL DAILY
Status: DISCONTINUED | OUTPATIENT
Start: 2017-11-05 | End: 2017-11-05

## 2017-11-05 RX ORDER — AMLODIPINE BESYLATE 5 MG/1
5 TABLET ORAL DAILY
Status: DISCONTINUED | OUTPATIENT
Start: 2017-11-05 | End: 2017-11-06

## 2017-11-05 RX ORDER — SENNA PLUS 8.6 MG/1
1 TABLET ORAL DAILY
Status: DISCONTINUED | OUTPATIENT
Start: 2017-11-05 | End: 2017-11-08

## 2017-11-05 RX ORDER — ONDANSETRON 4 MG/1
4 TABLET, FILM COATED ORAL EVERY 8 HOURS PRN
Status: DISCONTINUED | OUTPATIENT
Start: 2017-11-05 | End: 2017-11-10 | Stop reason: HOSPADM

## 2017-11-05 RX ORDER — ROPINIROLE 1 MG/1
2 TABLET, FILM COATED ORAL NIGHTLY
Status: DISCONTINUED | OUTPATIENT
Start: 2017-11-05 | End: 2017-11-08

## 2017-11-05 RX ORDER — SIMVASTATIN 40 MG
40 TABLET ORAL DAILY
Status: DISCONTINUED | OUTPATIENT
Start: 2017-11-05 | End: 2017-11-08

## 2017-11-05 RX ORDER — DULOXETIN HYDROCHLORIDE 60 MG/1
60 CAPSULE, DELAYED RELEASE ORAL DAILY
Status: DISCONTINUED | OUTPATIENT
Start: 2017-11-05 | End: 2017-11-08

## 2017-11-05 RX ORDER — LEVOTHYROXINE SODIUM 0.1 MG/1
150 TABLET ORAL DAILY
Status: DISCONTINUED | OUTPATIENT
Start: 2017-11-05 | End: 2017-11-08

## 2017-11-05 RX ORDER — ERGOCALCIFEROL 1.25 MG/1
50000 CAPSULE ORAL WEEKLY
Status: DISCONTINUED | OUTPATIENT
Start: 2017-11-05 | End: 2017-11-08

## 2017-11-05 RX ADMIN — DOCUSATE SODIUM 300 MG: 100 CAPSULE, LIQUID FILLED ORAL at 20:40

## 2017-11-05 RX ADMIN — ENOXAPARIN SODIUM 30 MG: 100 INJECTION SUBCUTANEOUS at 17:18

## 2017-11-05 RX ADMIN — INSULIN LISPRO 5 UNITS: 100 INJECTION, SOLUTION INTRAVENOUS; SUBCUTANEOUS at 11:18

## 2017-11-05 RX ADMIN — ACETAMINOPHEN 650 MG: 325 TABLET, FILM COATED ORAL at 02:48

## 2017-11-05 RX ADMIN — ISOSORBIDE MONONITRATE 120 MG: 60 TABLET, EXTENDED RELEASE ORAL at 09:54

## 2017-11-05 RX ADMIN — METOPROLOL TARTRATE 100 MG: 50 TABLET ORAL at 20:42

## 2017-11-05 RX ADMIN — SENNOSIDES 8.6 MG: 8.6 TABLET, FILM COATED ORAL at 10:08

## 2017-11-05 RX ADMIN — ROPINIROLE HYDROCHLORIDE 2 MG: 1 TABLET, FILM COATED ORAL at 20:39

## 2017-11-05 RX ADMIN — ACETAMINOPHEN 650 MG: 325 TABLET, FILM COATED ORAL at 20:50

## 2017-11-05 RX ADMIN — METOPROLOL TARTRATE 100 MG: 50 TABLET ORAL at 09:54

## 2017-11-05 RX ADMIN — AMLODIPINE BESYLATE 5 MG: 5 TABLET ORAL at 09:54

## 2017-11-05 RX ADMIN — FENTANYL CITRATE 25 MCG: 50 INJECTION, SOLUTION INTRAMUSCULAR; INTRAVENOUS at 22:44

## 2017-11-05 RX ADMIN — LEVOTHYROXINE SODIUM 150 MCG: 100 TABLET ORAL at 09:54

## 2017-11-05 RX ADMIN — HYDRALAZINE HYDROCHLORIDE 100 MG: 50 TABLET, FILM COATED ORAL at 09:54

## 2017-11-05 RX ADMIN — ONDANSETRON 4 MG: 2 INJECTION INTRAMUSCULAR; INTRAVENOUS at 22:45

## 2017-11-05 RX ADMIN — CLONIDINE HYDROCHLORIDE 0.1 MG: 0.1 TABLET ORAL at 20:41

## 2017-11-05 RX ADMIN — NIFEDIPINE 60 MG: 60 TABLET, FILM COATED, EXTENDED RELEASE ORAL at 10:16

## 2017-11-05 RX ADMIN — Medication 10 ML: at 09:55

## 2017-11-05 RX ADMIN — ONDANSETRON 4 MG: 2 INJECTION INTRAMUSCULAR; INTRAVENOUS at 18:56

## 2017-11-05 RX ADMIN — DULOXETINE HYDROCHLORIDE 60 MG: 60 CAPSULE, DELAYED RELEASE ORAL at 10:08

## 2017-11-05 RX ADMIN — HYDRALAZINE HYDROCHLORIDE 100 MG: 50 TABLET, FILM COATED ORAL at 13:43

## 2017-11-05 RX ADMIN — ONDANSETRON 4 MG: 2 INJECTION INTRAMUSCULAR; INTRAVENOUS at 12:42

## 2017-11-05 RX ADMIN — CLONIDINE HYDROCHLORIDE 0.1 MG: 0.1 TABLET ORAL at 09:54

## 2017-11-05 RX ADMIN — SIMVASTATIN 40 MG: 40 TABLET, FILM COATED ORAL at 10:08

## 2017-11-05 RX ADMIN — ACETAMINOPHEN 650 MG: 325 TABLET, FILM COATED ORAL at 16:00

## 2017-11-05 RX ADMIN — DEXTROSE MONOHYDRATE 1.5 MG/HR: 50 INJECTION, SOLUTION INTRAVENOUS at 04:49

## 2017-11-05 RX ADMIN — ERGOCALCIFEROL 50000 UNITS: 1.25 CAPSULE ORAL at 10:08

## 2017-11-05 RX ADMIN — INSULIN GLARGINE 5 UNITS: 100 INJECTION, SOLUTION SUBCUTANEOUS at 20:45

## 2017-11-05 RX ADMIN — INSULIN LISPRO 5 UNITS: 100 INJECTION, SOLUTION INTRAVENOUS; SUBCUTANEOUS at 16:46

## 2017-11-05 RX ADMIN — HYDRALAZINE HYDROCHLORIDE 100 MG: 50 TABLET, FILM COATED ORAL at 20:40

## 2017-11-05 RX ADMIN — VALSARTAN 320 MG: 160 TABLET ORAL at 10:09

## 2017-11-05 ASSESSMENT — PAIN DESCRIPTION - LOCATION: LOCATION: HEAD

## 2017-11-05 ASSESSMENT — PAIN DESCRIPTION - PAIN TYPE: TYPE: ACUTE PAIN

## 2017-11-05 ASSESSMENT — PAIN SCALES - GENERAL
PAINLEVEL_OUTOF10: 4
PAINLEVEL_OUTOF10: 4
PAINLEVEL_OUTOF10: 6
PAINLEVEL_OUTOF10: 7
PAINLEVEL_OUTOF10: 6
PAINLEVEL_OUTOF10: 5
PAINLEVEL_OUTOF10: 8

## 2017-11-06 LAB
ANION GAP SERPL CALCULATED.3IONS-SCNC: 7 MMOL/L (ref 7–19)
BASOPHILS ABSOLUTE: 0.1 K/UL (ref 0–0.2)
BASOPHILS RELATIVE PERCENT: 1 % (ref 0–1)
BUN BLDV-MCNC: 30 MG/DL (ref 6–20)
CALCIUM SERPL-MCNC: 9 MG/DL (ref 8.6–10)
CHLORIDE BLD-SCNC: 90 MMOL/L (ref 98–111)
CO2: 36 MMOL/L (ref 22–29)
CREAT SERPL-MCNC: 6.5 MG/DL (ref 0.5–0.9)
EOSINOPHILS ABSOLUTE: 0.1 K/UL (ref 0–0.6)
EOSINOPHILS RELATIVE PERCENT: 2 % (ref 0–5)
GFR NON-AFRICAN AMERICAN: 7
GLUCOSE BLD-MCNC: 122 MG/DL (ref 70–99)
GLUCOSE BLD-MCNC: 147 MG/DL (ref 70–99)
GLUCOSE BLD-MCNC: 212 MG/DL (ref 70–99)
GLUCOSE BLD-MCNC: 49 MG/DL (ref 74–109)
GLUCOSE BLD-MCNC: 52 MG/DL (ref 70–99)
HCT VFR BLD CALC: 30.1 % (ref 37–47)
HEMOGLOBIN: 9.9 G/DL (ref 12–16)
LYMPHOCYTES ABSOLUTE: 1.2 K/UL (ref 1.1–4.5)
LYMPHOCYTES RELATIVE PERCENT: 20.5 % (ref 20–40)
MCH RBC QN AUTO: 31.3 PG (ref 27–31)
MCHC RBC AUTO-ENTMCNC: 32.9 G/DL (ref 33–37)
MCV RBC AUTO: 95.3 FL (ref 81–99)
MONOCYTES ABSOLUTE: 0.6 K/UL (ref 0–0.9)
MONOCYTES RELATIVE PERCENT: 9.3 % (ref 0–10)
NEUTROPHILS ABSOLUTE: 4 K/UL (ref 1.5–7.5)
NEUTROPHILS RELATIVE PERCENT: 67 % (ref 50–65)
PDW BLD-RTO: 13.1 % (ref 11.5–14.5)
PERFORMED ON: ABNORMAL
PLATELET # BLD: 133 K/UL (ref 130–400)
PMV BLD AUTO: 9.1 FL (ref 9.4–12.3)
POTASSIUM SERPL-SCNC: 5 MMOL/L (ref 3.5–5)
RBC # BLD: 3.16 M/UL (ref 4.2–5.4)
SODIUM BLD-SCNC: 133 MMOL/L (ref 136–145)
WBC # BLD: 6 K/UL (ref 4.8–10.8)

## 2017-11-06 PROCEDURE — 82948 REAGENT STRIP/BLOOD GLUCOSE: CPT

## 2017-11-06 PROCEDURE — 36415 COLL VENOUS BLD VENIPUNCTURE: CPT

## 2017-11-06 PROCEDURE — 6370000000 HC RX 637 (ALT 250 FOR IP): Performed by: INTERNAL MEDICINE

## 2017-11-06 PROCEDURE — 85025 COMPLETE CBC W/AUTO DIFF WBC: CPT

## 2017-11-06 PROCEDURE — 6370000000 HC RX 637 (ALT 250 FOR IP): Performed by: FAMILY MEDICINE

## 2017-11-06 PROCEDURE — 1210000000 HC MED SURG R&B

## 2017-11-06 PROCEDURE — 80048 BASIC METABOLIC PNL TOTAL CA: CPT

## 2017-11-06 PROCEDURE — 2580000003 HC RX 258: Performed by: INTERNAL MEDICINE

## 2017-11-06 PROCEDURE — 99232 SBSQ HOSP IP/OBS MODERATE 35: CPT | Performed by: FAMILY MEDICINE

## 2017-11-06 PROCEDURE — 6360000002 HC RX W HCPCS: Performed by: FAMILY MEDICINE

## 2017-11-06 PROCEDURE — 6360000002 HC RX W HCPCS: Performed by: INTERNAL MEDICINE

## 2017-11-06 RX ORDER — HYDRALAZINE HYDROCHLORIDE 20 MG/ML
10 INJECTION INTRAMUSCULAR; INTRAVENOUS EVERY 6 HOURS PRN
Status: DISCONTINUED | OUTPATIENT
Start: 2017-11-06 | End: 2017-11-10 | Stop reason: HOSPADM

## 2017-11-06 RX ORDER — NIFEDIPINE 60 MG/1
60 TABLET, EXTENDED RELEASE ORAL DAILY
Status: DISCONTINUED | OUTPATIENT
Start: 2017-11-06 | End: 2017-11-06

## 2017-11-06 RX ORDER — DEXTROSE MONOHYDRATE 50 MG/ML
100 INJECTION, SOLUTION INTRAVENOUS PRN
Status: DISCONTINUED | OUTPATIENT
Start: 2017-11-06 | End: 2017-11-10 | Stop reason: HOSPADM

## 2017-11-06 RX ORDER — HYDROCODONE BITARTRATE AND ACETAMINOPHEN 10; 325 MG/1; MG/1
1 TABLET ORAL ONCE
Status: COMPLETED | OUTPATIENT
Start: 2017-11-06 | End: 2017-11-06

## 2017-11-06 RX ORDER — NICOTINE POLACRILEX 4 MG
15 LOZENGE BUCCAL PRN
Status: DISCONTINUED | OUTPATIENT
Start: 2017-11-06 | End: 2017-11-10 | Stop reason: HOSPADM

## 2017-11-06 RX ORDER — DEXTROSE MONOHYDRATE 25 G/50ML
12.5 INJECTION, SOLUTION INTRAVENOUS PRN
Status: DISCONTINUED | OUTPATIENT
Start: 2017-11-06 | End: 2017-11-10 | Stop reason: HOSPADM

## 2017-11-06 RX ORDER — BUMETANIDE 1 MG/1
2 TABLET ORAL DAILY
Status: DISCONTINUED | OUTPATIENT
Start: 2017-11-06 | End: 2017-11-08

## 2017-11-06 RX ORDER — CHOLECALCIFEROL (VITAMIN D3) 10 MCG
1 TABLET ORAL DAILY
Status: DISCONTINUED | OUTPATIENT
Start: 2017-11-06 | End: 2017-11-08

## 2017-11-06 RX ORDER — AMLODIPINE BESYLATE 5 MG/1
5 TABLET ORAL 2 TIMES DAILY
Status: DISCONTINUED | OUTPATIENT
Start: 2017-11-06 | End: 2017-11-08

## 2017-11-06 RX ADMIN — SENNOSIDES 8.6 MG: 8.6 TABLET, FILM COATED ORAL at 07:52

## 2017-11-06 RX ADMIN — ACETAMINOPHEN 650 MG: 325 TABLET, FILM COATED ORAL at 17:34

## 2017-11-06 RX ADMIN — ENOXAPARIN SODIUM 30 MG: 100 INJECTION SUBCUTANEOUS at 17:30

## 2017-11-06 RX ADMIN — CLONIDINE HYDROCHLORIDE 0.1 MG: 0.1 TABLET ORAL at 09:00

## 2017-11-06 RX ADMIN — LEVOTHYROXINE SODIUM 150 MCG: 100 TABLET ORAL at 07:50

## 2017-11-06 RX ADMIN — HYDRALAZINE HYDROCHLORIDE 100 MG: 50 TABLET, FILM COATED ORAL at 21:31

## 2017-11-06 RX ADMIN — VALSARTAN 320 MG: 160 TABLET ORAL at 07:52

## 2017-11-06 RX ADMIN — HYDRALAZINE HYDROCHLORIDE 100 MG: 50 TABLET, FILM COATED ORAL at 08:00

## 2017-11-06 RX ADMIN — NEPHROCAP 1 MG: 1 CAP ORAL at 18:15

## 2017-11-06 RX ADMIN — NIFEDIPINE 60 MG: 60 TABLET, FILM COATED, EXTENDED RELEASE ORAL at 07:53

## 2017-11-06 RX ADMIN — Medication 10 ML: at 21:37

## 2017-11-06 RX ADMIN — DULOXETINE HYDROCHLORIDE 60 MG: 60 CAPSULE, DELAYED RELEASE ORAL at 07:53

## 2017-11-06 RX ADMIN — AMLODIPINE BESYLATE 5 MG: 5 TABLET ORAL at 07:55

## 2017-11-06 RX ADMIN — SIMVASTATIN 40 MG: 40 TABLET, FILM COATED ORAL at 07:55

## 2017-11-06 RX ADMIN — BUMETANIDE 2 MG: 1 TABLET ORAL at 18:15

## 2017-11-06 RX ADMIN — METOPROLOL TARTRATE 100 MG: 50 TABLET ORAL at 21:30

## 2017-11-06 RX ADMIN — BETHANECHOL CHLORIDE 25 MG: 25 TABLET ORAL at 07:52

## 2017-11-06 RX ADMIN — CLONIDINE HYDROCHLORIDE 0.1 MG: 0.1 TABLET ORAL at 21:30

## 2017-11-06 RX ADMIN — ACETAMINOPHEN 650 MG: 325 TABLET, FILM COATED ORAL at 21:32

## 2017-11-06 RX ADMIN — HYDRALAZINE HYDROCHLORIDE 100 MG: 50 TABLET, FILM COATED ORAL at 15:00

## 2017-11-06 RX ADMIN — ONDANSETRON 4 MG: 2 INJECTION INTRAMUSCULAR; INTRAVENOUS at 21:33

## 2017-11-06 RX ADMIN — HYDROCODONE BITARTRATE AND ACETAMINOPHEN 1 TABLET: 10; 325 TABLET ORAL at 02:00

## 2017-11-06 RX ADMIN — ROPINIROLE HYDROCHLORIDE 2 MG: 1 TABLET, FILM COATED ORAL at 21:31

## 2017-11-06 RX ADMIN — ONDANSETRON 4 MG: 2 INJECTION INTRAMUSCULAR; INTRAVENOUS at 15:26

## 2017-11-06 RX ADMIN — AMLODIPINE BESYLATE 5 MG: 5 TABLET ORAL at 21:30

## 2017-11-06 RX ADMIN — INSULIN LISPRO 5 UNITS: 100 INJECTION, SOLUTION INTRAVENOUS; SUBCUTANEOUS at 07:38

## 2017-11-06 RX ADMIN — ISOSORBIDE MONONITRATE 120 MG: 60 TABLET, EXTENDED RELEASE ORAL at 07:51

## 2017-11-06 RX ADMIN — ONDANSETRON 4 MG: 2 INJECTION INTRAMUSCULAR; INTRAVENOUS at 08:33

## 2017-11-06 RX ADMIN — HYDRALAZINE HYDROCHLORIDE 10 MG: 20 INJECTION INTRAMUSCULAR; INTRAVENOUS at 15:26

## 2017-11-06 RX ADMIN — METOPROLOL TARTRATE 100 MG: 50 TABLET ORAL at 08:00

## 2017-11-06 RX ADMIN — DOCUSATE SODIUM 300 MG: 100 CAPSULE, LIQUID FILLED ORAL at 21:29

## 2017-11-06 ASSESSMENT — PAIN SCALES - GENERAL
PAINLEVEL_OUTOF10: 0
PAINLEVEL_OUTOF10: 4
PAINLEVEL_OUTOF10: 8
PAINLEVEL_OUTOF10: 0
PAINLEVEL_OUTOF10: 0
PAINLEVEL_OUTOF10: 7
PAINLEVEL_OUTOF10: 5

## 2017-11-06 NOTE — PROGRESS NOTES
flush  10 mL Intravenous 2 times per day    enoxaparin  30 mg Subcutaneous Daily    nicotine  1 patch Transdermal Daily     hydrALAZINE, ondansetron, nitroGLYCERIN, sodium chloride flush, acetaminophen, magnesium hydroxide, ondansetron  DIET CARB CONTROL; Carb Control: 4 carbs/meal (approximate 1800 kcals/day); Renal     DVT Prophylaxis: Lovenox. Lab and other Data:     Recent Labs      11/04/17 1347 11/05/17   0009   WBC  5.4  4.7*   HGB  9.8*  9.6*   PLT  134  117*     Recent Labs      11/04/17   1347  11/05/17   0009   NA  136  133*   K  3.8  4.8   CL  90*  87*   CO2  38*  37*   BUN  11  14   CREATININE  2.5*  4.0*   GLUCOSE  78  359*     Recent Labs      11/04/17 1347   AST  23   ALT  24   BILITOT  0.4   ALKPHOS  97     Troponin T:   Recent Labs      11/04/17 1347   TROPONINI  <0.01     Pro-BNP: No results for input(s): BNP in the last 72 hours. INR:   Recent Labs      11/04/17 1347   INR  1.00     RAD:   XR CHEST STANDARD (2 VW) [[id number]] Resulted: 11/04/17 1451   1. Borderline cardiomegaly with mild prominence of the pulmonary  vasculature. No interstitial or carie pulmonary edema. 2. No focal lung infiltrates. CT Head WO Contrast [[id number]] Resulted: 11/04/17 1349      Impression:       1. No acute intracranial process.      Patient Active Problem List    Diagnosis Date Noted    Acute renal failure with tubular necrosis ([facility] [address] 05/17/2017     Priority: High     Class: Acute    Family history of colon cancer 10/20/2017    Unintentional weight loss 10/20/2017    ESRD (end stage renal disease) ([facility] [address]     ESRD on hemodialysis ([facility] [address] 05/23/2017    Hyperkalemia 05/23/2017    Anemia in chronic kidney disease (CKD) 05/23/2017    Iron deficiency 05/23/2017    Hypoglycemia 05/22/2017    Chronic kidney disease, stage 5, kidney failure (HCC)     Gastroparesis     GERD (gastroesophageal reflux disease)     Hypertensive emergency     Hypothyroid     Non-intractable vomiting with nausea

## 2017-11-06 NOTE — PROGRESS NOTES
Nutrition Assessment    Type and Reason for Visit: Initial, Positive Nutrition Screen    Nutrition Recommendations: continue current POC    Malnutrition Assessment:  · Malnutrition Status: No malnutrition    Nutrition Diagnosis:   · Problem: Overweight/Obese  · Etiology: related to  (related to excess calories for activity level)     Signs and symptoms:  as evidenced by BMI    Nutrition Assessment:  · Subjective Assessment: Positive nutrition screen for decreased po intake and decreased weight. Patient's appetite has improved with intake now %. Weight in 8/2017 174. · Nutrition-Focused Physical Findings:    · Wound Type: None  · Current Nutrition Therapies:  · Oral Diet Orders: Carb Control 4 Carbs/Meal, Renal   · Oral Diet intake: %  · Oral Nutrition Supplement (ONS) Orders: None    · Anthropometric Measures:  · Ht: 5' 6\" (167.6 cm)   · Current Body Wt: 171 lb 3 oz (77.7 kg)  · Admission Body Wt: 170 lb (77.1 kg)  · Ideal Body Wt: 130 lb (59 kg),   · BMI Classification: BMI 25.0 - 29.9 Overweight    Estimated Intake vs Estimated Needs: Intake Improving    Nutrition Risk Level: Moderate    Nutrition Interventions:   Continue current diet  Continued Inpatient Monitoring    Nutrition Evaluation:   · Evaluation: Goals set   · Goals: meet nutritional needs through po intake. Weight stable or decreased 1-3# per week    · Monitoring: Meal Intake, Diet Tolerance, Skin Integrity, Fluid Balance, Weight, Pertinent Labs    See Adult Nutrition Doc Flowsheet for more detail.      Electronically signed by Josi Fuller MS, RD, LD on 11/6/17 at 12:37 PM    Contact Number: [phone]

## 2017-11-07 ENCOUNTER — APPOINTMENT (OUTPATIENT)
Dept: NUCLEAR MEDICINE | Age: 47
DRG: 304 | End: 2017-11-07
Payer: MEDICARE

## 2017-11-07 LAB
ANION GAP SERPL CALCULATED.3IONS-SCNC: 15 MMOL/L (ref 7–19)
BASOPHILS ABSOLUTE: 0 K/UL (ref 0–0.2)
BASOPHILS RELATIVE PERCENT: 0.9 % (ref 0–1)
BUN BLDV-MCNC: 39 MG/DL (ref 6–20)
CALCIUM SERPL-MCNC: 8.7 MG/DL (ref 8.6–10)
CHLORIDE BLD-SCNC: 87 MMOL/L (ref 98–111)
CO2: 30 MMOL/L (ref 22–29)
CREAT SERPL-MCNC: 7.8 MG/DL (ref 0.5–0.9)
EOSINOPHILS ABSOLUTE: 0.1 K/UL (ref 0–0.6)
EOSINOPHILS RELATIVE PERCENT: 2.7 % (ref 0–5)
GFR NON-AFRICAN AMERICAN: 6
GLUCOSE BLD-MCNC: 221 MG/DL (ref 74–109)
GLUCOSE BLD-MCNC: 243 MG/DL (ref 70–99)
GLUCOSE BLD-MCNC: 269 MG/DL (ref 70–99)
GLUCOSE BLD-MCNC: 283 MG/DL (ref 70–99)
HCT VFR BLD CALC: 30.2 % (ref 37–47)
HEMOGLOBIN: 9.7 G/DL (ref 12–16)
LYMPHOCYTES ABSOLUTE: 1.2 K/UL (ref 1.1–4.5)
LYMPHOCYTES RELATIVE PERCENT: 27.4 % (ref 20–40)
MCH RBC QN AUTO: 30.6 PG (ref 27–31)
MCHC RBC AUTO-ENTMCNC: 32.1 G/DL (ref 33–37)
MCV RBC AUTO: 95.3 FL (ref 81–99)
MONOCYTES ABSOLUTE: 0.4 K/UL (ref 0–0.9)
MONOCYTES RELATIVE PERCENT: 8.4 % (ref 0–10)
NEUTROPHILS ABSOLUTE: 2.7 K/UL (ref 1.5–7.5)
NEUTROPHILS RELATIVE PERCENT: 60.4 % (ref 50–65)
PDW BLD-RTO: 13.2 % (ref 11.5–14.5)
PERFORMED ON: ABNORMAL
PLATELET # BLD: 133 K/UL (ref 130–400)
PMV BLD AUTO: 9.8 FL (ref 9.4–12.3)
POTASSIUM SERPL-SCNC: 6.3 MMOL/L (ref 3.5–5)
RBC # BLD: 3.17 M/UL (ref 4.2–5.4)
SODIUM BLD-SCNC: 132 MMOL/L (ref 136–145)
WBC # BLD: 4.4 K/UL (ref 4.8–10.8)

## 2017-11-07 PROCEDURE — 80048 BASIC METABOLIC PNL TOTAL CA: CPT

## 2017-11-07 PROCEDURE — 1210000000 HC MED SURG R&B

## 2017-11-07 PROCEDURE — 6370000000 HC RX 637 (ALT 250 FOR IP): Performed by: INTERNAL MEDICINE

## 2017-11-07 PROCEDURE — 36415 COLL VENOUS BLD VENIPUNCTURE: CPT

## 2017-11-07 PROCEDURE — 80074 ACUTE HEPATITIS PANEL: CPT

## 2017-11-07 PROCEDURE — 6360000002 HC RX W HCPCS: Performed by: INTERNAL MEDICINE

## 2017-11-07 PROCEDURE — A9541 TC99M SULFUR COLLOID: HCPCS | Performed by: INTERNAL MEDICINE

## 2017-11-07 PROCEDURE — 82948 REAGENT STRIP/BLOOD GLUCOSE: CPT

## 2017-11-07 PROCEDURE — 6370000000 HC RX 637 (ALT 250 FOR IP): Performed by: FAMILY MEDICINE

## 2017-11-07 PROCEDURE — 5A1D70Z PERFORMANCE OF URINARY FILTRATION, INTERMITTENT, LESS THAN 6 HOURS PER DAY: ICD-10-PCS | Performed by: INTERNAL MEDICINE

## 2017-11-07 PROCEDURE — 85025 COMPLETE CBC W/AUTO DIFF WBC: CPT

## 2017-11-07 PROCEDURE — 6360000002 HC RX W HCPCS: Performed by: FAMILY MEDICINE

## 2017-11-07 PROCEDURE — 99232 SBSQ HOSP IP/OBS MODERATE 35: CPT | Performed by: FAMILY MEDICINE

## 2017-11-07 PROCEDURE — 78264 GASTRIC EMPTYING IMG STUDY: CPT

## 2017-11-07 PROCEDURE — 2580000003 HC RX 258: Performed by: INTERNAL MEDICINE

## 2017-11-07 PROCEDURE — 8010000000 HC HEMODIALYSIS ACUTE INPT

## 2017-11-07 PROCEDURE — 3430000000 HC RX DIAGNOSTIC RADIOPHARMACEUTICAL: Performed by: INTERNAL MEDICINE

## 2017-11-07 RX ORDER — MINOXIDIL 2.5 MG/1
2.5 TABLET ORAL DAILY
Status: DISCONTINUED | OUTPATIENT
Start: 2017-11-07 | End: 2017-11-08

## 2017-11-07 RX ORDER — ERYTHROMYCIN ETHYLSUCCINATE 400 MG/1
400 TABLET ORAL
Status: DISCONTINUED | OUTPATIENT
Start: 2017-11-07 | End: 2017-11-08

## 2017-11-07 RX ORDER — SODIUM POLYSTYRENE SULFONATE 15 G/60ML
15 SUSPENSION ORAL; RECTAL ONCE
Status: COMPLETED | OUTPATIENT
Start: 2017-11-07 | End: 2017-11-07

## 2017-11-07 RX ADMIN — NIFEDIPINE 60 MG: 60 TABLET, FILM COATED, EXTENDED RELEASE ORAL at 12:20

## 2017-11-07 RX ADMIN — BUMETANIDE 2 MG: 1 TABLET ORAL at 12:21

## 2017-11-07 RX ADMIN — METOPROLOL TARTRATE 100 MG: 50 TABLET ORAL at 12:21

## 2017-11-07 RX ADMIN — HYDRALAZINE HYDROCHLORIDE 10 MG: 20 INJECTION INTRAMUSCULAR; INTRAVENOUS at 01:28

## 2017-11-07 RX ADMIN — ROPINIROLE HYDROCHLORIDE 2 MG: 1 TABLET, FILM COATED ORAL at 21:07

## 2017-11-07 RX ADMIN — INSULIN LISPRO 5 UNITS: 100 INJECTION, SOLUTION INTRAVENOUS; SUBCUTANEOUS at 17:35

## 2017-11-07 RX ADMIN — MINOXIDIL 2.5 MG: 2.5 TABLET ORAL at 20:22

## 2017-11-07 RX ADMIN — AMLODIPINE BESYLATE 5 MG: 5 TABLET ORAL at 20:22

## 2017-11-07 RX ADMIN — NEPHROCAP 1 MG: 1 CAP ORAL at 12:20

## 2017-11-07 RX ADMIN — INSULIN GLARGINE 5 UNITS: 100 INJECTION, SOLUTION SUBCUTANEOUS at 20:23

## 2017-11-07 RX ADMIN — CLONIDINE HYDROCHLORIDE 0.1 MG: 0.1 TABLET ORAL at 12:20

## 2017-11-07 RX ADMIN — Medication 10 ML: at 20:27

## 2017-11-07 RX ADMIN — SENNOSIDES 8.6 MG: 8.6 TABLET, FILM COATED ORAL at 12:20

## 2017-11-07 RX ADMIN — CLONIDINE HYDROCHLORIDE 0.1 MG: 0.1 TABLET ORAL at 20:23

## 2017-11-07 RX ADMIN — HYDRALAZINE HYDROCHLORIDE 100 MG: 50 TABLET, FILM COATED ORAL at 20:21

## 2017-11-07 RX ADMIN — INSULIN LISPRO 3 UNITS: 100 INJECTION, SOLUTION INTRAVENOUS; SUBCUTANEOUS at 20:26

## 2017-11-07 RX ADMIN — Medication 2 MILLICURIE: at 15:09

## 2017-11-07 RX ADMIN — ONDANSETRON 4 MG: 2 INJECTION INTRAMUSCULAR; INTRAVENOUS at 08:20

## 2017-11-07 RX ADMIN — ACETAMINOPHEN 650 MG: 325 TABLET, FILM COATED ORAL at 21:15

## 2017-11-07 RX ADMIN — Medication 10 ML: at 01:28

## 2017-11-07 RX ADMIN — INSULIN LISPRO 4 UNITS: 100 INJECTION, SOLUTION INTRAVENOUS; SUBCUTANEOUS at 17:35

## 2017-11-07 RX ADMIN — SIMVASTATIN 40 MG: 40 TABLET, FILM COATED ORAL at 12:21

## 2017-11-07 RX ADMIN — ENOXAPARIN SODIUM 30 MG: 100 INJECTION SUBCUTANEOUS at 17:35

## 2017-11-07 RX ADMIN — AMLODIPINE BESYLATE 5 MG: 5 TABLET ORAL at 12:20

## 2017-11-07 RX ADMIN — HYDRALAZINE HYDROCHLORIDE 100 MG: 50 TABLET, FILM COATED ORAL at 12:20

## 2017-11-07 RX ADMIN — LEVOTHYROXINE SODIUM 150 MCG: 100 TABLET ORAL at 06:01

## 2017-11-07 RX ADMIN — VALSARTAN 320 MG: 160 TABLET ORAL at 12:20

## 2017-11-07 RX ADMIN — DULOXETINE HYDROCHLORIDE 60 MG: 60 CAPSULE, DELAYED RELEASE ORAL at 12:21

## 2017-11-07 RX ADMIN — METOPROLOL TARTRATE 100 MG: 50 TABLET ORAL at 20:22

## 2017-11-07 RX ADMIN — SODIUM POLYSTYRENE SULFONATE 15 G: 15 SUSPENSION ORAL; RECTAL at 12:19

## 2017-11-07 RX ADMIN — ERYTHROMYCIN ETHYLSUCCINATE 400 MG: 400 TABLET ORAL at 22:13

## 2017-11-07 RX ADMIN — DOCUSATE SODIUM 300 MG: 100 CAPSULE, LIQUID FILLED ORAL at 20:23

## 2017-11-07 RX ADMIN — ISOSORBIDE MONONITRATE 120 MG: 60 TABLET, EXTENDED RELEASE ORAL at 12:20

## 2017-11-07 ASSESSMENT — PAIN SCALES - GENERAL
PAINLEVEL_OUTOF10: 3
PAINLEVEL_OUTOF10: 8

## 2017-11-07 NOTE — PROGRESS NOTES
60 mg  60 mg Oral Daily Geovanny Estevez MD   60 mg at 11/06/17 0753    nitroGLYCERIN (NITROSTAT) SL tablet 0.4 mg  0.4 mg Sublingual Q5 Min PRN Sera Tomlin MD   0.4 mg at 11/04/17 1315    sodium chloride flush 0.9 % injection 10 mL  10 mL Intravenous 2 times per day Geovanny Estevez MD   10 mL at 11/06/17 2137    sodium chloride flush 0.9 % injection 10 mL  10 mL Intravenous PRN Geovanny Estevez MD   10 mL at 11/07/17 0128    acetaminophen (TYLENOL) tablet 650 mg  650 mg Oral Q4H PRN Geovanny Estevez MD   650 mg at 11/06/17 2132    magnesium hydroxide (MILK OF MAGNESIA) 400 MG/5ML suspension 30 mL  30 mL Oral Daily PRN Geovanny Estevez MD        [facility]etron [facility]) injection 4 mg  4 mg Intravenous Q6H PRN Geovanny Estevez MD   4 mg at 11/07/17 0820    enoxaparin (LOVENOX) injection 30 mg  30 mg Subcutaneous Daily Geovanny Estevez MD   30 mg at 11/06/17 1730    nicotine (NICODERM CQ) 14 MG/24HR 1 patch  1 patch Transdermal Daily Geovanny Estevez MD   1 patch at 11/06/17 1800       Past Medical History:  Past Medical History:   Diagnosis Date    Arthritis     Chronic kidney disease, stage 5, kidney failure ([facility] [address]     DM (diabetes mellitus) type II controlled with renal manifestation ([facility] [address] 06/1993    Gastroparesis     GERD (gastroesophageal reflux disease)     Hypertension     Hypothyroid     Restless leg syndrome        Past Surgical History:  Past Surgical History:   Procedure Laterality Date    BREAST SURGERY      infected milk gland removed    CHOLECYSTECTOMY      COLONOSCOPY      ENDOMETRIAL ABLATION  2012    GALLBLADDER SURGERY      [place] COLONOSCOPY W/BIOPSY SINGLE/MULTIPLE N/A 10/20/2017    Dr Brooke Portillo prep-Tubular AP (-) dysplasia x 2--3 yr recall    [place] EGD TRANSORAL BIOPSY SINGLE/MULTIPLE N/A 12/27/2016    Dr Jose Harp EGD TRANSORAL BIOPSY SINGLE/MULTIPLE N/A 10/20/2017    Dr Therese Manuel (-)    TUBAL LIGATION      VASCULAR SURGERY Left 2016    fistula by  30.1*  30.2*   MCV  94.8  95.3  95.3   PLT  117*  133  133     LIVER PROFILE:   Recent Labs      11/04/17   1347   AST  23   ALT  24   BILITOT  0.4   ALKPHOS  97     PT/INR:   Recent Labs      11/04/17   1347   PROTIME  13.1   INR  1.00     APTT: No results for input(s): APTT in the last 72 hours. BNP:  No results for input(s): BNP in the last 72 hours. Ionized Calcium:No results for input(s): IONCA in the last 72 hours. Magnesium:No results for input(s): MG in the last 72 hours. Phosphorus:No results for input(s): PHOS in the last 72 hours. HgbA1C: No results for input(s): LABA1C in the last 72 hours. Hepatic: Recent Labs      11/04/17   1347   ALKPHOS  97   ALT  24   AST  23   PROT  6.7   BILITOT  0.4   LABALBU  3.4*     Lactic Acid: No results for input(s): LACTA in the last 72 hours. Troponin: No results for input(s): CKTOTAL, CKMB, TROPONINT in the last 72 hours. ABGs: No results for input(s): PH, PCO2, PO2, HCO3, O2SAT in the last 72 hours. CRP:  No results for input(s): CRP in the last 72 hours. Sed Rate:  No results for input(s): SEDRATE in the last 72 hours. Cultures:   No results for input(s): CULTURE in the last 72 hours. No results for input(s): BC, Rodríguez Agreste in the last 72 hours. No results for input(s): CXSURG in the last 72 hours. Radiology reports as per the Radiologist  Radiology: Xr Chest Standard (2 Vw)    Result Date: 11/4/2017  XR CHEST STANDARD TWO VW 11/4/2017 11:30 AM HISTORY: Shortness of breath COMPARISON: Exam dated 10/23/2017. FINDINGS: The lungs are clear. Upper limit of normal for heart size. There is mild prominence of the pulmonary vasculature. No evidence of interstitial or carie pulmonary edema. The osseous structures and surrounding soft tissues demonstrate no acute abnormality. 1. Borderline cardiomegaly with mild prominence of the pulmonary vasculature. No interstitial or carie pulmonary edema. 2. No focal lung infiltrates.  Signed by Dr Arya Bowen on 11/4/2017 1:51 PM    Ct Head Wo Contrast    Result Date: 11/4/2017  CT HEAD WO CONTRAST 11/4/2017 11:15 AM HISTORY: Severe headache COMPARISON: None DOSE LENGTH PRODUCT: 880 mGy cm TECHNIQUE: Helical tomographic images of the brain were obtained without the use of intravenous contrast. FINDINGS: There is no evidence of evolving large vascular territory infarct. No visualized intra-axial or extra-axial hemorrhage. No mass lesion is identified. Normal size and configuration of the ventricular system. The basal cisterns are symmetric. Posterior fossa structures are unremarkable. The included orbits and their contents are unremarkable. The visualized paranasal sinuses, mastoid air cells and middle ear cavities are clear. The visualized osseous structures and overlying soft tissues of the skull and face are unremarkable. 1. No acute intracranial process. Signed by Dr Halle Saba on 11/4/2017 12:49 PM       Assessment     -ESRD  -Type 2 DM with renal disease  -Benign HTN with hypertensive crisis  -Anemia of ckd  -Nausea-likely gastroparesis  -Hyperkalemia    Plan:  Patient likely has diabetic gastroparesis, and delayed gastric emptying causing delay in meds delivery and absorption along with nausea and vomiting. I will add minoxidil 2.5 mg po daily to take along other meds. Will also get a gastric emptying test. 2K bath with dialysis.      Miguel Nelson MD  11/07/17  9:07 AM

## 2017-11-07 NOTE — PLAN OF CARE
Problem: Nausea/Vomiting:  Goal: Absence of nausea/vomiting  Absence of nausea/vomiting  Outcome: Ongoing    Goal: Able to drink  Able to drink  Outcome: Ongoing    Goal: Able to eat  Able to eat  Outcome: Ongoing    Goal: Ability to achieve adequate nutritional intake will improve  Ability to achieve adequate nutritional intake will improve  Outcome: Ongoing

## 2017-11-07 NOTE — PROGRESS NOTES
[facility] Hospitalists      Patient:  Lidia Marin  YOB: 1970  Date of Service: 11/7/2017  MRN: [id number]   Acct: [de-identified]   Primary Care Physician: ROMINA Alex  Advance Directive: Full Code  Admit Date: 11/4/2017       Hospital Day: 3    CHIEF COMPLAINT Nausea    SUBJECTIVE: Ms. Bernardo Palumbo complains of nausea and mild posterior headache. She states headache has been constant since admission but is improved overall. Cumulative Hospital Course:   Ms. Bernardo Palumbo is a 49year old female who was admitted to Hospitalist service after she presented to the emergency department with headache and hypertensive emergency. CT head was unremarkable. She was started on a Cardene drip and Nephrology was consulted for known ESRD. Cardene was weaned off and her home medications were resumed. Review of Systems:   Constitutional / general:  Denies fever / chills / sweats  Head:  + headache /Denies neck stiffness / trauma / visual change  Eyes:  Denies blurry vision / acute visual change or loss / itching / redness  ENT: Denies sore throat / hoarseness / nasal drainage / ear pain  CV:  Denies chest pain / palpitations/ orthopnea   Respiratory:  Denies cough / shortness of breath / sputum / hemoptysis  GI: + nausea / Denies vomiting / abdominal pain / diarrhea / + constipation  :  Denies dysuria / hesitancy / urgency / hematuria   Neuro: Denies paralysis / syncope / seizure / dysphagia / headache / paresthesias  Musculoskeletal:  Denies muscle weakness /joint stiffness / pain  Vascular: Denies edema / claudication / varicosities  Heme / endocrine: Denies easy bruising / bleeding / excessive sweating / heat or cold intolerance  Psychiatric:  Denies depression / anxiety / insomnia / mood changes  Skin:  Denies new rashes / lesions / skin hair or nail changes    14 point review of systems is negative except as specifically addressed above.     Objective:   VITALS:  BP (!) 174/82 Comment: manual  Pulse 70 Temp 98.5 °F (36.9 °C) (Temporal)   Resp 16   Ht 5' 6\" (1.676 m)   Wt 171 lb 3.2 oz (77.7 kg)   SpO2 93%   BMI 27.63 kg/m²   24HR INTAKE/OUTPUT:      Intake/Output Summary (Last 24 hours) at 11/07/17 1057  Last data filed at 11/06/17 1604   Gross per 24 hour   Intake                0 ml   Output              450 ml   Net             -450 ml     General appearance: alert, appears stated age, cooperative and no distress, tolerating dialysis treatment  Head: Normocephalic, without obvious abnormality, atraumatic  Eyes: conjunctivae/corneas clear. PERRL, EOM's intact. Ears: normal external ears and nose, throat without exudate  Neck: no adenopathy, no carotid bruit, no JVD, supple, symmetrical, trachea midline and thyroid not enlarged, symmetric, no tenderness/mass/nodules  Lungs: clear to auscultation bilaterally,no rales or wheezes   Heart: regular rate and rhythm, S1, S2 normal, no murmur  Abdomen:soft, non-tender; non-distended, sluggish bowel sounds no masses, no organomegaly  Extremities:No lower extremity edema,  No erythema, no tenderness to palpation  Skin: Skin color, texture, turgor normal. No rashes or lesions  Lymphatic: No palpable lymph node enlargment  Neurologic: Alert and oriented X 3, normal strength and tone. Normal symmetric reflexes.  Mental status: Alert, oriented, thought content appropriate  Cranial nerves:II-XII Grossly intact Sensory: normal Motor:grossly normal  Psychiatric: Alert and oriented, thought content appropriate, normal insight, mood appropriate    Medications:      dextrose        sodium polystyrene  15 g Oral Once    minoxidil  2.5 mg Oral Daily    amLODIPine  5 mg Oral BID    insulin lispro  0-12 Units Subcutaneous TID WC    insulin lispro  0-6 Units Subcutaneous Nightly    bumetanide  2 mg Oral Daily    b complex-C-folic acid  1 capsule Oral Daily    vitamin D  50,000 Units Oral Weekly    cloNIDine  0.1 mg Oral BID    docusate sodium  300 mg Oral Nightly    DULoxetine  60 mg Oral Daily    hydrALAZINE  100 mg Oral TID    insulin lispro  5 Units Subcutaneous TID     isosorbide mononitrate  120 mg Oral Daily    levothyroxine  150 mcg Oral Daily    metoprolol  100 mg Oral BID    rOPINIRole  2 mg Oral Nightly    senna  1 tablet Oral Daily    simvastatin  40 mg Oral Daily    valsartan  320 mg Oral Daily    insulin glargine  5 Units Subcutaneous Nightly    NIFEdipine  60 mg Oral Daily    sodium chloride flush  10 mL Intravenous 2 times per day    enoxaparin  30 mg Subcutaneous Daily    nicotine  1 patch Transdermal Daily     hydrALAZINE, glucose, dextrose, glucagon (rDNA), dextrose, ondansetron, nitroGLYCERIN, sodium chloride flush, acetaminophen, magnesium hydroxide, ondansetron  Diet NPO Effective Now     Lab and other Data:     Recent Labs      11/05/17   0009  11/06/17   1053  11/07/17   0401   WBC  4.7*  6.0  4.4*   HGB  9.6*  9.9*  9.7*   PLT  117*  133  133     Recent Labs      11/05/17   0009  11/06/17   1053  11/07/17   0401   NA  133*  133*  132*   K  4.8  5.0  6.3*   CL  87*  90*  87*   CO2  37*  36*  30*   BUN  14  30*  39*   CREATININE  4.0*  6.5*  7.8*   GLUCOSE  359*  49*  221*     Recent Labs      11/04/17   1347   AST  23   ALT  24   BILITOT  0.4   ALKPHOS  97     Troponin T:   Recent Labs      11/04/17   1347   TROPONINI  <0.01     INR:   Recent Labs      11/04/17   1347   INR  1.00     RAD:   CXR 11/04/2017  1. Borderline cardiomegaly with mild prominence of the pulmonary vasculature. No interstitial or carie pulmonary edema. 2. No focal lung infiltrates. CT Head 11/04/2017  1. No acute intracranial process.     Assessment/Plan   Principal Problem:    Hypertensive emergency-now weaned off Cardene, Norvasc, Bumex, Hydralazine, Imdur, Lopressor, Nifedipine, Diovan continued     Active Problems:    Gastroparesis-chronic with non-intractable nausea    Hyperkalemia-suspect improvement after dialysis    DM (diabetes mellitus) type II controlled

## 2017-11-08 LAB
ANION GAP SERPL CALCULATED.3IONS-SCNC: 22 MMOL/L (ref 7–19)
BASOPHILS ABSOLUTE: 0 K/UL (ref 0–0.2)
BASOPHILS RELATIVE PERCENT: 0.9 % (ref 0–1)
BUN BLDV-MCNC: 25 MG/DL (ref 6–20)
CALCIUM SERPL-MCNC: 9 MG/DL (ref 8.6–10)
CHLORIDE BLD-SCNC: 90 MMOL/L (ref 98–111)
CO2: 24 MMOL/L (ref 22–29)
CREAT SERPL-MCNC: 5.2 MG/DL (ref 0.5–0.9)
EOSINOPHILS ABSOLUTE: 0.1 K/UL (ref 0–0.6)
EOSINOPHILS RELATIVE PERCENT: 1.6 % (ref 0–5)
GFR NON-AFRICAN AMERICAN: 9
GLUCOSE BLD-MCNC: 139 MG/DL (ref 70–99)
GLUCOSE BLD-MCNC: 177 MG/DL (ref 74–109)
GLUCOSE BLD-MCNC: 200 MG/DL (ref 70–99)
GLUCOSE BLD-MCNC: 218 MG/DL (ref 70–99)
GLUCOSE BLD-MCNC: 329 MG/DL (ref 70–99)
HAV IGM SER IA-ACNC: NORMAL
HCT VFR BLD CALC: 33.4 % (ref 37–47)
HEMOGLOBIN: 10.8 G/DL (ref 12–16)
HEPATITIS B CORE IGM ANTIBODY: NORMAL
HEPATITIS B SURFACE ANTIGEN INTERPRETATION: NORMAL
HEPATITIS C ANTIBODY INTERPRETATION: NORMAL
LYMPHOCYTES ABSOLUTE: 1 K/UL (ref 1.1–4.5)
LYMPHOCYTES RELATIVE PERCENT: 22.9 % (ref 20–40)
MCH RBC QN AUTO: 30.6 PG (ref 27–31)
MCHC RBC AUTO-ENTMCNC: 32.3 G/DL (ref 33–37)
MCV RBC AUTO: 94.6 FL (ref 81–99)
MONOCYTES ABSOLUTE: 0.4 K/UL (ref 0–0.9)
MONOCYTES RELATIVE PERCENT: 9.7 % (ref 0–10)
NEUTROPHILS ABSOLUTE: 2.9 K/UL (ref 1.5–7.5)
NEUTROPHILS RELATIVE PERCENT: 64.7 % (ref 50–65)
PDW BLD-RTO: 13.3 % (ref 11.5–14.5)
PERFORMED ON: ABNORMAL
PLATELET # BLD: 153 K/UL (ref 130–400)
PMV BLD AUTO: 9.6 FL (ref 9.4–12.3)
POTASSIUM SERPL-SCNC: 4.6 MMOL/L (ref 3.5–5)
RBC # BLD: 3.53 M/UL (ref 4.2–5.4)
SODIUM BLD-SCNC: 136 MMOL/L (ref 136–145)
WBC # BLD: 4.4 K/UL (ref 4.8–10.8)

## 2017-11-08 PROCEDURE — 2580000003 HC RX 258: Performed by: INTERNAL MEDICINE

## 2017-11-08 PROCEDURE — 6370000000 HC RX 637 (ALT 250 FOR IP): Performed by: FAMILY MEDICINE

## 2017-11-08 PROCEDURE — 6360000002 HC RX W HCPCS: Performed by: INTERNAL MEDICINE

## 2017-11-08 PROCEDURE — 36415 COLL VENOUS BLD VENIPUNCTURE: CPT

## 2017-11-08 PROCEDURE — 85025 COMPLETE CBC W/AUTO DIFF WBC: CPT

## 2017-11-08 PROCEDURE — 1210000000 HC MED SURG R&B

## 2017-11-08 PROCEDURE — 82948 REAGENT STRIP/BLOOD GLUCOSE: CPT

## 2017-11-08 PROCEDURE — 6370000000 HC RX 637 (ALT 250 FOR IP): Performed by: INTERNAL MEDICINE

## 2017-11-08 PROCEDURE — 6360000002 HC RX W HCPCS: Performed by: FAMILY MEDICINE

## 2017-11-08 PROCEDURE — 2580000003 HC RX 258: Performed by: PHYSICIAN ASSISTANT

## 2017-11-08 PROCEDURE — C9113 INJ PANTOPRAZOLE SODIUM, VIA: HCPCS | Performed by: INTERNAL MEDICINE

## 2017-11-08 PROCEDURE — 80048 BASIC METABOLIC PNL TOTAL CA: CPT

## 2017-11-08 PROCEDURE — 99222 1ST HOSP IP/OBS MODERATE 55: CPT | Performed by: INTERNAL MEDICINE

## 2017-11-08 PROCEDURE — 6360000002 HC RX W HCPCS: Performed by: PHYSICIAN ASSISTANT

## 2017-11-08 PROCEDURE — 99232 SBSQ HOSP IP/OBS MODERATE 35: CPT | Performed by: FAMILY MEDICINE

## 2017-11-08 PROCEDURE — 2500000003 HC RX 250 WO HCPCS: Performed by: PHYSICIAN ASSISTANT

## 2017-11-08 RX ORDER — METOCLOPRAMIDE HYDROCHLORIDE 5 MG/ML
5 INJECTION INTRAMUSCULAR; INTRAVENOUS EVERY 6 HOURS SCHEDULED
Status: DISCONTINUED | OUTPATIENT
Start: 2017-11-08 | End: 2017-11-10

## 2017-11-08 RX ORDER — HYDRALAZINE HYDROCHLORIDE 20 MG/ML
10 INJECTION INTRAMUSCULAR; INTRAVENOUS EVERY 6 HOURS
Status: DISCONTINUED | OUTPATIENT
Start: 2017-11-08 | End: 2017-11-09

## 2017-11-08 RX ORDER — 0.9 % SODIUM CHLORIDE 0.9 %
10 VIAL (ML) INJECTION DAILY
Status: DISCONTINUED | OUTPATIENT
Start: 2017-11-08 | End: 2017-11-10 | Stop reason: HOSPADM

## 2017-11-08 RX ORDER — DIPHENHYDRAMINE HYDROCHLORIDE 50 MG/ML
25 INJECTION INTRAMUSCULAR; INTRAVENOUS EVERY 6 HOURS PRN
Status: DISCONTINUED | OUTPATIENT
Start: 2017-11-08 | End: 2017-11-10 | Stop reason: HOSPADM

## 2017-11-08 RX ORDER — METOPROLOL TARTRATE 5 MG/5ML
5 INJECTION INTRAVENOUS EVERY 6 HOURS PRN
Status: DISCONTINUED | OUTPATIENT
Start: 2017-11-08 | End: 2017-11-10 | Stop reason: HOSPADM

## 2017-11-08 RX ORDER — PANTOPRAZOLE SODIUM 40 MG/10ML
40 INJECTION, POWDER, LYOPHILIZED, FOR SOLUTION INTRAVENOUS 2 TIMES DAILY
Status: DISCONTINUED | OUTPATIENT
Start: 2017-11-08 | End: 2017-11-10 | Stop reason: HOSPADM

## 2017-11-08 RX ORDER — METOCLOPRAMIDE HYDROCHLORIDE 5 MG/ML
10 INJECTION INTRAMUSCULAR; INTRAVENOUS EVERY 6 HOURS
Status: DISCONTINUED | OUTPATIENT
Start: 2017-11-08 | End: 2017-11-08

## 2017-11-08 RX ADMIN — Medication 10 ML: at 01:26

## 2017-11-08 RX ADMIN — HYDRALAZINE HYDROCHLORIDE 10 MG: 20 INJECTION INTRAMUSCULAR; INTRAVENOUS at 13:35

## 2017-11-08 RX ADMIN — INSULIN LISPRO 4 UNITS: 100 INJECTION, SOLUTION INTRAVENOUS; SUBCUTANEOUS at 12:22

## 2017-11-08 RX ADMIN — ERYTHROMYCIN LACTOBIONATE 250 MG: 500 INJECTION, POWDER, LYOPHILIZED, FOR SOLUTION INTRAVENOUS at 12:21

## 2017-11-08 RX ADMIN — METOCLOPRAMIDE 5 MG: 5 INJECTION, SOLUTION INTRAMUSCULAR; INTRAVENOUS at 17:06

## 2017-11-08 RX ADMIN — ONDANSETRON 4 MG: 2 INJECTION INTRAMUSCULAR; INTRAVENOUS at 01:26

## 2017-11-08 RX ADMIN — Medication 10 ML: at 20:38

## 2017-11-08 RX ADMIN — ONDANSETRON 4 MG: 2 INJECTION INTRAMUSCULAR; INTRAVENOUS at 09:32

## 2017-11-08 RX ADMIN — INSULIN LISPRO 5 UNITS: 100 INJECTION, SOLUTION INTRAVENOUS; SUBCUTANEOUS at 09:25

## 2017-11-08 RX ADMIN — METOCLOPRAMIDE 10 MG: 5 INJECTION, SOLUTION INTRAMUSCULAR; INTRAVENOUS at 10:59

## 2017-11-08 RX ADMIN — ERYTHROMYCIN LACTOBIONATE 250 MG: 500 INJECTION, POWDER, LYOPHILIZED, FOR SOLUTION INTRAVENOUS at 20:33

## 2017-11-08 RX ADMIN — ENOXAPARIN SODIUM 30 MG: 100 INJECTION SUBCUTANEOUS at 17:06

## 2017-11-08 RX ADMIN — METOPROLOL TARTRATE 5 MG: 5 INJECTION INTRAVENOUS at 23:40

## 2017-11-08 RX ADMIN — Medication 10 ML: at 06:03

## 2017-11-08 RX ADMIN — PANTOPRAZOLE SODIUM 40 MG: 40 INJECTION, POWDER, FOR SOLUTION INTRAVENOUS at 10:59

## 2017-11-08 RX ADMIN — HYDRALAZINE HYDROCHLORIDE 10 MG: 20 INJECTION INTRAMUSCULAR; INTRAVENOUS at 20:32

## 2017-11-08 RX ADMIN — HYDRALAZINE HYDROCHLORIDE 10 MG: 20 INJECTION INTRAMUSCULAR; INTRAVENOUS at 06:02

## 2017-11-08 RX ADMIN — METOPROLOL TARTRATE 5 MG: 5 INJECTION INTRAVENOUS at 09:33

## 2017-11-08 RX ADMIN — Medication 10 ML: at 10:59

## 2017-11-08 RX ADMIN — PANTOPRAZOLE SODIUM 40 MG: 40 INJECTION, POWDER, FOR SOLUTION INTRAVENOUS at 20:32

## 2017-11-08 RX ADMIN — INSULIN LISPRO 4 UNITS: 100 INJECTION, SOLUTION INTRAVENOUS; SUBCUTANEOUS at 09:24

## 2017-11-08 RX ADMIN — INSULIN LISPRO 4 UNITS: 100 INJECTION, SOLUTION INTRAVENOUS; SUBCUTANEOUS at 20:32

## 2017-11-08 NOTE — PROGRESS NOTES
Pharmacy Renal Adjustment    Ana An is a 49 y.o. female. Pharmacy has renally adjusted Reglan per protocol. Recent Labs      11/07/17   0401  11/08/17   0404   BUN  39*  25*       Recent Labs      11/07/17   0401  11/08/17   0404   CREATININE  7.8*  5.2*       Estimated Creatinine Clearance: 14 mL/min (based on SCr of 5.2 mg/dL).     Height:   Ht Readings from Last 1 Encounters:   11/04/17 5' 6\" (1.676 m)     Weight:  Wt Readings from Last 1 Encounters:   11/07/17 166 lb (75.3 kg)       CKD stage:  ERSD    Plan: Changed Reglan 10 mg IV q6h to 5 mg IV q6h    Electronically signed by Ace Helm RPH on 11/8/2017 at 1:59 PM

## 2017-11-08 NOTE — PROGRESS NOTES
[facility] Hospitalists      Patient:  Moi Dorantes  YOB: 1970  Date of Service: 11/8/2017  MRN: [id number]   Acct: [de-identified]   Primary Care Physician: ROMINA Vidal  Advance Directive: Full Code  Admit Date: 11/4/2017       Hospital Day: 4    CHIEF COMPLAINT Nausea and vomiting    SUBJECTIVE: Ms. Palmira Lazaro states her headache is mildly improved but she complains of intractable nausea and vomiting since last evening, has been constant, worsened with eating/drinking associated with epigastric abdominal pain and continued constipation. She denies hematemesis. Cumulative Hospital Course:   Ms. Palmira Lazaro is a 49year old female who was admitted to Hospitalist service after she presented to the emergency department with headache and hypertensive emergency. CT head was unremarkable. She was started on a Cardene drip and Nephrology was consulted for known ESRD. Cardene was weaned off and her home medications were resumed with addition of Minoxidil. A gastric emptying study was concerning for gastroparesis vs gastric outlet obstruction on 11/07/2017. Erythromycin was added to her medication regimen. She appeared cachectic on 11/07/2017 and had complaint of intractable nausea and vomiting. Gastroenterology was consulted, erythromycin was changed to IV and nursing communication given to hold all oral medications and that she be made NPO for now.      Review of Systems:   Constitutional / general:  Denies fever / chills / sweats  Head:  + headache /Denies neck stiffness / trauma / visual change  Eyes:  Denies blurry vision / acute visual change or loss / itching / redness  ENT: Denies sore throat / hoarseness / nasal drainage / ear pain  CV:  Denies chest pain / palpitations/ orthopnea   Respiratory:  Denies cough / shortness of breath / sputum / hemoptysis  GI: + nausea / + vomiting / +abdominal pain / Denies diarrhea / + constipation  :  Denies dysuria / hesitancy / urgency / hematuria   Neuro: Denies paralysis / syncope / seizure / dysphagia / headache / paresthesias  Musculoskeletal: + muscle weakness /Denies joint stiffness / pain  Vascular: Denies edema / claudication / varicosities  Heme / endocrine: Denies easy bruising / bleeding / excessive sweating / heat or cold intolerance  Psychiatric:  Denies depression / anxiety / insomnia / mood changes  Skin:  Denies new rashes / lesions / skin hair or nail changes    14 point review of systems is negative except as specifically addressed above. Objective:   VITALS:  BP (!) 156/77   Pulse 72   Temp 98.9 °F (37.2 °C) (Temporal)   Resp 16   Ht 5' 6\" (1.676 m)   Wt 166 lb (75.3 kg)   SpO2 95%   BMI 26.79 kg/m²   24HR INTAKE/OUTPUT:      Intake/Output Summary (Last 24 hours) at 11/08/17 1017  Last data filed at 11/08/17 0936   Gross per 24 hour   Intake              360 ml   Output              550 ml   Net             -190 ml     General appearance: alert, appears stated age, cooperative, cachectic appearance, no acute distress  Head: Normocephalic, without obvious abnormality, atraumatic  Eyes: conjunctivae/corneas clear. PERRL, EOM's intact. Ears: normal external ears and nose, throat without exudate  Neck: no adenopathy, no carotid bruit, no JVD, supple, symmetrical, trachea midline and thyroid not enlarged, symmetric, no tenderness/mass/nodules  Lungs: clear to auscultation bilaterally,no rales or wheezes   Heart: regular rate and rhythm, S1, S2 normal, I/VI systolic murmur  Abdomen:soft, mild epigastric tenderness; non-distended, faint, sluggish bowel sounds no masses, no organomegaly  Extremities:No lower extremity edema,  No erythema, no tenderness to palpation  Skin: Skin color, texture, turgor normal. No rashes or lesions  Lymphatic: No palpable lymph node enlargment  Neurologic: Alert and oriented X 3, generalized weakness and normal tone. Normal symmetric reflexes.  Mental status: Alert, oriented, thought content appropriate  Cranial nerves:II-XII Grossly intact Sensory: normal Motor:grossly normal  Psychiatric: Alert and oriented, thought content appropriate, normal insight, mood appropriate    Medications:      dextrose        erythromycin  100 mg Intravenous Q8H    metoclopramide  10 mg Intravenous Q6H    pantoprazole  40 mg Intravenous BID    And    sodium chloride (PF)  10 mL Intravenous Daily    minoxidil  2.5 mg Oral Daily    amLODIPine  5 mg Oral BID    insulin lispro  0-12 Units Subcutaneous TID WC    insulin lispro  0-6 Units Subcutaneous Nightly    bumetanide  2 mg Oral Daily    b complex-C-folic acid  1 capsule Oral Daily    vitamin D  50,000 Units Oral Weekly    cloNIDine  0.1 mg Oral BID    docusate sodium  300 mg Oral Nightly    DULoxetine  60 mg Oral Daily    hydrALAZINE  100 mg Oral TID    insulin lispro  5 Units Subcutaneous TID WC    isosorbide mononitrate  120 mg Oral Daily    levothyroxine  150 mcg Oral Daily    metoprolol  100 mg Oral BID    rOPINIRole  2 mg Oral Nightly    senna  1 tablet Oral Daily    simvastatin  40 mg Oral Daily    valsartan  320 mg Oral Daily    insulin glargine  5 Units Subcutaneous Nightly    NIFEdipine  60 mg Oral Daily    sodium chloride flush  10 mL Intravenous 2 times per day    enoxaparin  30 mg Subcutaneous Daily    nicotine  1 patch Transdermal Daily     metoprolol, technetium sulfur colloid, hydrALAZINE, glucose, dextrose, glucagon (rDNA), dextrose, ondansetron, nitroGLYCERIN, sodium chloride flush, acetaminophen, magnesium hydroxide, ondansetron  Diet NPO Effective Now     Lab and other Data:     Recent Labs      11/06/17   1053  11/07/17   0401  11/08/17   0404   WBC  6.0  4.4*  4.4*   HGB  9.9*  9.7*  10.8*   PLT  133  133  153     Recent Labs      11/06/17   1053  11/07/17   0401  11/08/17   0404   NA  133*  132*  136   K  5.0  6.3*  4.6   CL  90*  87*  90*   CO2  36*  30*  24   BUN  30*  39*  25*   CREATININE  6.5*  7.8*  5.2*   GLUCOSE  49*  221*  177* RAD:   CXR 11/04/2017  1. Borderline cardiomegaly with mild prominence of the pulmonary vasculature. No interstitial or carie pulmonary edema. 2. No focal lung infiltrates. CT Head 11/04/2017  1. No acute intracranial process. Assessment/Plan   Principal Problem:    Hypertensive emergency-now weaned off Cardene, Norvasc 5 BID, Bumex 2 mg daily, Hydralazine 100 TID, Imdur 120 daily, Lopressor 100 BID, Nifedipine 60 daily, Catapres 0.1 mg BID,  Diovan 320 daily, Minoxidil 2.5 did have better control, will hold PO meds for now. Add IV Hydralazine, Lopressor. Active Problems:    Gastroparesis-chronic with intractable nausea and vomiting, change erythromycin to IV check baseline EKG to monitor QT and continue telemetry, consult GI    Hyperkalemia-resolved    DM (diabetes mellitus) type II controlled with renal manifestation (HCC)-SSI, hold Lantus while NPO     Hypothyroid-synthroid to be resumed when taking oral meds, will also check TSH    Anemia in chronic kidney disease (CKD)-stable, monitor     ESRD (end stage renal disease) (HCC)-nephrology following     Hold PO meds for now, IV Hydralazine/Lopressor prn-will monitor closely, consult GI, change erythromycin to IV, check baseline EKG and continue telemetry. Stop Lantus and meal time insulin since she is NPO    DVT Prophylaxis: Lovenox    Vernon Soto PA-C     Patient was examined independently. Chart reviewed and events noted.     No new complaints other than stated above.     Vitals noted. Blood pressure remains elevated. Chest CTAB. S1 S2 RRR. GI bening. Neuro no new deficits. Integumentary no rash.     Labs noted.      Nephrology input noted and agreed. Trial of Erythromycin, nausea is worsening. GI input.     Agree with above assessment and plan.  Will continue to follow.     Tarun Caruso MD  Hospitalist service  11/8/2017

## 2017-11-08 NOTE — PROGRESS NOTES
Nephrology ([facility]) Progress Note    Patient:  Rody Martinez  YOB: 1970  Date of Service: 11/8/2017  MRN: [id number]   Acct: [de-identified]   Primary Care Physician: ROMINA Logan  Advance Directive: Full Code  Admit Date: 11/4/2017       Hospital Day: 4  Referring Provider: Keerthi Dickinson MD    Patient Seen, Chart, Consults notes, Labs, Radiology studies reviewed. Subjective:  Rody Martinez is a 49 y.o. female  whom we were consulted for ESRD management. Patient is admitted with hypertensive crisis, nausea and headache. She is s/p dialysis yesterday. Her BPs have improved some after Minoxidil was added as well as her headaches. Today, she was very nauseated and was vomiting. She appeared very ill.      Allergies:  Lamisil advanced [terbinafine hcl] and Penicillins    Medicines:  Current Facility-Administered Medications   Medication Dose Route Frequency Provider Last Rate Last Dose    minoxidil (LONITEN) tablet 2.5 mg  2.5 mg Oral Daily Chapo Glez MD   2.5 mg at 11/07/17 2022    technetium sulfur colloid (NYCOMED-SC) solution 5 millicurie  5 millicurie Intravenous ONCE PRN Chapo Glez MD        erythromycin ethylsuccinate (EES) tablet 400 mg  400 mg Oral 4x Daily AC & HS Danny Fuller MD   400 mg at 11/07/17 2213    amLODIPine (NORVASC) tablet 5 mg  5 mg Oral BID Danny Fuller MD   5 mg at 11/07/17 2022    hydrALAZINE (APRESOLINE) injection 10 mg  10 mg Intravenous Q6H PRN Danny Fuller MD   10 mg at 11/08/17 0602    glucose (GLUTOSE) 40 % oral gel 15 g  15 g Oral PRN Danny Fuller MD        dextrose 50 % solution 12.5 g  12.5 g Intravenous PRN Danny Fuller MD        glucagon (rDNA) injection 1 mg  1 mg Intramuscular PRN Danny Fuller MD        dextrose 5 % solution  100 mL/hr Intravenous PRN Danny Fuller MD        insulin lispro (HUMALOG) injection vial 0-12 Units  0-12 Units Subcutaneous TID GIANNA Greenfield Aline Jane MD   4 Units at 11/07/17 1735    insulin lispro (HUMALOG) injection vial 0-6 Units  0-6 Units Subcutaneous Nightly Oleksandr Ellis MD   3 Units at 11/07/17 2026    bumetanide (BUMEX) tablet 2 mg  2 mg Oral Daily Stacey Ayoub MD   2 mg at 11/07/17 1221    b complex-C-folic acid (NEPHROCAPS) capsule 1 mg  1 capsule Oral Daily Stacey Ayoub MD   1 mg at 11/07/17 1220    vitamin D (ERGOCALCIFEROL) capsule 50,000 Units  50,000 Units Oral Weekly Edmundo Ramirez MD   50,000 Units at 11/05/17 1008    cloNIDine (CATAPRES) tablet 0.1 mg  0.1 mg Oral BID Edmundo Ramirez MD   0.1 mg at 11/07/17 2023    docusate sodium (COLACE) capsule 300 mg  300 mg Oral Nightly Edmundo Ramirez MD   300 mg at 11/07/17 2023    DULoxetine (CYMBALTA) extended release capsule 60 mg  60 mg Oral Daily Edmundo Ramirez MD   60 mg at 11/07/17 1221    hydrALAZINE (APRESOLINE) tablet 100 mg  100 mg Oral TID Edmundo Ramirez MD   100 mg at 11/07/17 2021    insulin lispro (HUMALOG) injection vial 5 Units  5 Units Subcutaneous TID WC Edmundo Ramirez MD   5 Units at 11/07/17 1735    isosorbide mononitrate (IMDUR) extended release tablet 120 mg  120 mg Oral Daily Edmundo Ramirez MD   120 mg at 11/07/17 1220    levothyroxine (SYNTHROID) tablet 150 mcg  150 mcg Oral Daily Edmundo Ramirez MD   150 mcg at 11/07/17 0601    metoprolol tartrate (LOPRESSOR) tablet 100 mg  100 mg Oral BID Edmundo Ramirez MD   100 mg at 11/07/17 2022    ondansetron (ZOFRAN) tablet 4 mg  4 mg Oral Q8H PRN Edmundo Ramirez MD        rOPINIRole (REQUIP) tablet 2 mg  2 mg Oral Nightly Edmundo Ramirez MD   2 mg at 11/07/17 2107    senna (SENOKOT) tablet 8.6 mg  1 tablet Oral Daily Edmundo Ramirez MD   8.6 mg at 11/07/17 1220    simvastatin (ZOCOR) tablet 40 mg  40 mg Oral Daily Edmundo Ramirez MD   40 mg at 11/07/17 1221    valsartan (DIOVAN) tablet 320 mg  320 mg Oral Daily Edmundo Ramirez MD   320 mg at 11/07/17 1220    oriented x3   Chest:  clear to auscultation bilaterally without respiratory distress  CVS: regular rate and rhythm  Abdominal: soft, non tender to palpation  Extremities: no cyanosis or edema  Skin: warm and dry without rash    Labs:  BMP: Recent Labs      11/06/17   1053  11/07/17   0401  11/08/17   0404   NA  133*  132*  136   K  5.0  6.3*  4.6   CL  90*  87*  90*   CO2  36*  30*  24   BUN  30*  39*  25*   CREATININE  6.5*  7.8*  5.2*   CALCIUM  9.0  8.7  9.0     CBC: Recent Labs      11/06/17   1053  11/07/17   0401  11/08/17   0404   WBC  6.0  4.4*  4.4*   HGB  9.9*  9.7*  10.8*   HCT  30.1*  30.2*  33.4*   MCV  95.3  95.3  94.6   PLT  133  133  153     LIVER PROFILE: No results for input(s): AST, ALT, LIPASE, BILIDIR, BILITOT, ALKPHOS in the last 72 hours. Invalid input(s): AMYLASE,  ALB  PT/INR: No results for input(s): PROTIME, INR in the last 72 hours. APTT: No results for input(s): APTT in the last 72 hours. BNP:  No results for input(s): BNP in the last 72 hours. Ionized Calcium:No results for input(s): IONCA in the last 72 hours. Magnesium:No results for input(s): MG in the last 72 hours. Phosphorus:No results for input(s): PHOS in the last 72 hours. HgbA1C: No results for input(s): LABA1C in the last 72 hours. Hepatic: No results for input(s): ALKPHOS, ALT, AST, PROT, BILITOT, BILIDIR, LABALBU in the last 72 hours. Lactic Acid: No results for input(s): LACTA in the last 72 hours. Troponin: No results for input(s): CKTOTAL, CKMB, TROPONINT in the last 72 hours. ABGs: No results for input(s): PH, PCO2, PO2, HCO3, O2SAT in the last 72 hours. CRP:  No results for input(s): CRP in the last 72 hours. Sed Rate:  No results for input(s): SEDRATE in the last 72 hours. Cultures:   No results for input(s): CULTURE in the last 72 hours.     Radiology reports as per the Radiologist  Radiology: Xr Chest Standard (2 Vw)    Result Date: 11/4/2017  XR CHEST STANDARD TWO VW 11/4/2017 11:30 AM HISTORY:

## 2017-11-09 LAB
ANION GAP SERPL CALCULATED.3IONS-SCNC: 17 MMOL/L (ref 7–19)
BASOPHILS ABSOLUTE: 0.1 K/UL (ref 0–0.2)
BASOPHILS RELATIVE PERCENT: 1.1 % (ref 0–1)
BUN BLDV-MCNC: 34 MG/DL (ref 6–20)
CALCIUM SERPL-MCNC: 8.8 MG/DL (ref 8.6–10)
CHLORIDE BLD-SCNC: 89 MMOL/L (ref 98–111)
CO2: 28 MMOL/L (ref 22–29)
CREAT SERPL-MCNC: 6.9 MG/DL (ref 0.5–0.9)
EKG P AXIS: 72 DEGREES
EKG P AXIS: 73 DEGREES
EKG P-R INTERVAL: 162 MS
EKG P-R INTERVAL: 164 MS
EKG Q-T INTERVAL: 418 MS
EKG Q-T INTERVAL: 432 MS
EKG QRS DURATION: 90 MS
EKG QRS DURATION: 90 MS
EKG QTC CALCULATION (BAZETT): 442 MS
EKG QTC CALCULATION (BAZETT): 453 MS
EKG T AXIS: 72 DEGREES
EKG T AXIS: 80 DEGREES
EOSINOPHILS ABSOLUTE: 0.1 K/UL (ref 0–0.6)
EOSINOPHILS RELATIVE PERCENT: 1.8 % (ref 0–5)
GFR NON-AFRICAN AMERICAN: 6
GLUCOSE BLD-MCNC: 190 MG/DL (ref 70–99)
GLUCOSE BLD-MCNC: 273 MG/DL (ref 74–109)
GLUCOSE BLD-MCNC: 291 MG/DL (ref 70–99)
GLUCOSE BLD-MCNC: 409 MG/DL (ref 70–99)
GLUCOSE BLD-MCNC: 478 MG/DL (ref 70–99)
HCT VFR BLD CALC: 30.3 % (ref 37–47)
HEMOGLOBIN: 9.9 G/DL (ref 12–16)
LYMPHOCYTES ABSOLUTE: 1.3 K/UL (ref 1.1–4.5)
LYMPHOCYTES RELATIVE PERCENT: 28 % (ref 20–40)
MAGNESIUM: 2.3 MG/DL (ref 1.6–2.6)
MCH RBC QN AUTO: 30.8 PG (ref 27–31)
MCHC RBC AUTO-ENTMCNC: 32.7 G/DL (ref 33–37)
MCV RBC AUTO: 94.4 FL (ref 81–99)
MONOCYTES ABSOLUTE: 0.5 K/UL (ref 0–0.9)
MONOCYTES RELATIVE PERCENT: 10.5 % (ref 0–10)
NEUTROPHILS ABSOLUTE: 2.7 K/UL (ref 1.5–7.5)
NEUTROPHILS RELATIVE PERCENT: 58.6 % (ref 50–65)
PDW BLD-RTO: 13.2 % (ref 11.5–14.5)
PERFORMED ON: ABNORMAL
PHOSPHORUS: 5 MG/DL (ref 2.5–4.5)
PLATELET # BLD: 150 K/UL (ref 130–400)
PMV BLD AUTO: 9.4 FL (ref 9.4–12.3)
POTASSIUM SERPL-SCNC: 4 MMOL/L (ref 3.5–5)
RBC # BLD: 3.21 M/UL (ref 4.2–5.4)
SODIUM BLD-SCNC: 134 MMOL/L (ref 136–145)
TSH SERPL DL<=0.05 MIU/L-ACNC: 18.94 UIU/ML (ref 0.27–4.2)
WBC # BLD: 4.6 K/UL (ref 4.8–10.8)

## 2017-11-09 PROCEDURE — 2500000003 HC RX 250 WO HCPCS: Performed by: PHYSICIAN ASSISTANT

## 2017-11-09 PROCEDURE — 6360000002 HC RX W HCPCS: Performed by: PHYSICIAN ASSISTANT

## 2017-11-09 PROCEDURE — 6370000000 HC RX 637 (ALT 250 FOR IP): Performed by: PHYSICIAN ASSISTANT

## 2017-11-09 PROCEDURE — 8010000000 HC HEMODIALYSIS ACUTE INPT

## 2017-11-09 PROCEDURE — 85025 COMPLETE CBC W/AUTO DIFF WBC: CPT

## 2017-11-09 PROCEDURE — 84443 ASSAY THYROID STIM HORMONE: CPT

## 2017-11-09 PROCEDURE — 6370000000 HC RX 637 (ALT 250 FOR IP): Performed by: FAMILY MEDICINE

## 2017-11-09 PROCEDURE — 84100 ASSAY OF PHOSPHORUS: CPT

## 2017-11-09 PROCEDURE — 99232 SBSQ HOSP IP/OBS MODERATE 35: CPT | Performed by: FAMILY MEDICINE

## 2017-11-09 PROCEDURE — 6360000002 HC RX W HCPCS: Performed by: INTERNAL MEDICINE

## 2017-11-09 PROCEDURE — 6360000002 HC RX W HCPCS: Performed by: FAMILY MEDICINE

## 2017-11-09 PROCEDURE — 2580000003 HC RX 258: Performed by: INTERNAL MEDICINE

## 2017-11-09 PROCEDURE — 36415 COLL VENOUS BLD VENIPUNCTURE: CPT

## 2017-11-09 PROCEDURE — 82948 REAGENT STRIP/BLOOD GLUCOSE: CPT

## 2017-11-09 PROCEDURE — C9113 INJ PANTOPRAZOLE SODIUM, VIA: HCPCS | Performed by: INTERNAL MEDICINE

## 2017-11-09 PROCEDURE — 6370000000 HC RX 637 (ALT 250 FOR IP): Performed by: INTERNAL MEDICINE

## 2017-11-09 PROCEDURE — 1210000000 HC MED SURG R&B

## 2017-11-09 PROCEDURE — 83735 ASSAY OF MAGNESIUM: CPT

## 2017-11-09 PROCEDURE — 2580000003 HC RX 258: Performed by: PHYSICIAN ASSISTANT

## 2017-11-09 PROCEDURE — 80048 BASIC METABOLIC PNL TOTAL CA: CPT

## 2017-11-09 RX ORDER — NIFEDIPINE 60 MG/1
60 TABLET, EXTENDED RELEASE ORAL DAILY
Status: DISCONTINUED | OUTPATIENT
Start: 2017-11-09 | End: 2017-11-10 | Stop reason: HOSPADM

## 2017-11-09 RX ORDER — ROPINIROLE 1 MG/1
2 TABLET, FILM COATED ORAL ONCE
Status: COMPLETED | OUTPATIENT
Start: 2017-11-09 | End: 2017-11-09

## 2017-11-09 RX ORDER — HYDRALAZINE HYDROCHLORIDE 20 MG/ML
10 INJECTION INTRAMUSCULAR; INTRAVENOUS EVERY 4 HOURS
Status: DISCONTINUED | OUTPATIENT
Start: 2017-11-09 | End: 2017-11-10

## 2017-11-09 RX ORDER — LEVOTHYROXINE SODIUM ANHYDROUS 100 UG/5ML
75 INJECTION, POWDER, LYOPHILIZED, FOR SOLUTION INTRAVENOUS
Status: DISCONTINUED | OUTPATIENT
Start: 2017-11-09 | End: 2017-11-10

## 2017-11-09 RX ORDER — CLONIDINE HYDROCHLORIDE 0.1 MG/1
0.1 TABLET ORAL 3 TIMES DAILY
Status: DISCONTINUED | OUTPATIENT
Start: 2017-11-09 | End: 2017-11-10 | Stop reason: HOSPADM

## 2017-11-09 RX ORDER — HYDRALAZINE HYDROCHLORIDE 20 MG/ML
10 INJECTION INTRAMUSCULAR; INTRAVENOUS ONCE
Status: COMPLETED | OUTPATIENT
Start: 2017-11-09 | End: 2017-11-09

## 2017-11-09 RX ADMIN — METOCLOPRAMIDE 5 MG: 5 INJECTION, SOLUTION INTRAMUSCULAR; INTRAVENOUS at 00:26

## 2017-11-09 RX ADMIN — PANTOPRAZOLE SODIUM 40 MG: 40 INJECTION, POWDER, FOR SOLUTION INTRAVENOUS at 11:18

## 2017-11-09 RX ADMIN — NIFEDIPINE 60 MG: 60 TABLET, FILM COATED, EXTENDED RELEASE ORAL at 16:48

## 2017-11-09 RX ADMIN — Medication 10 ML: at 11:20

## 2017-11-09 RX ADMIN — ERYTHROMYCIN LACTOBIONATE 250 MG: 500 INJECTION, POWDER, LYOPHILIZED, FOR SOLUTION INTRAVENOUS at 11:17

## 2017-11-09 RX ADMIN — ERYTHROMYCIN LACTOBIONATE 250 MG: 500 INJECTION, POWDER, LYOPHILIZED, FOR SOLUTION INTRAVENOUS at 04:06

## 2017-11-09 RX ADMIN — METOCLOPRAMIDE 5 MG: 5 INJECTION, SOLUTION INTRAMUSCULAR; INTRAVENOUS at 11:18

## 2017-11-09 RX ADMIN — HYDRALAZINE HYDROCHLORIDE 10 MG: 20 INJECTION INTRAMUSCULAR; INTRAVENOUS at 02:27

## 2017-11-09 RX ADMIN — ROPINIROLE HYDROCHLORIDE 2 MG: 1 TABLET, FILM COATED ORAL at 00:51

## 2017-11-09 RX ADMIN — HYDRALAZINE HYDROCHLORIDE 10 MG: 20 INJECTION INTRAMUSCULAR; INTRAVENOUS at 21:47

## 2017-11-09 RX ADMIN — LEVOTHYROXINE SODIUM ANHYDROUS 75 MCG: 100 INJECTION, POWDER, LYOPHILIZED, FOR SOLUTION INTRAVENOUS at 11:19

## 2017-11-09 RX ADMIN — METOCLOPRAMIDE 5 MG: 5 INJECTION, SOLUTION INTRAMUSCULAR; INTRAVENOUS at 05:53

## 2017-11-09 RX ADMIN — HYDRALAZINE HYDROCHLORIDE 10 MG: 20 INJECTION INTRAMUSCULAR; INTRAVENOUS at 05:53

## 2017-11-09 RX ADMIN — HYDRALAZINE HYDROCHLORIDE 10 MG: 20 INJECTION INTRAMUSCULAR; INTRAVENOUS at 14:10

## 2017-11-09 RX ADMIN — CLONIDINE HYDROCHLORIDE 0.1 MG: 0.1 TABLET ORAL at 12:18

## 2017-11-09 RX ADMIN — METOCLOPRAMIDE 5 MG: 5 INJECTION, SOLUTION INTRAMUSCULAR; INTRAVENOUS at 16:38

## 2017-11-09 RX ADMIN — INSULIN LISPRO 2 UNITS: 100 INJECTION, SOLUTION INTRAVENOUS; SUBCUTANEOUS at 11:17

## 2017-11-09 RX ADMIN — HYDRALAZINE HYDROCHLORIDE 10 MG: 20 INJECTION INTRAMUSCULAR; INTRAVENOUS at 16:48

## 2017-11-09 RX ADMIN — HYDRALAZINE HYDROCHLORIDE 10 MG: 20 INJECTION INTRAMUSCULAR; INTRAVENOUS at 11:18

## 2017-11-09 RX ADMIN — Medication 10 ML: at 21:49

## 2017-11-09 RX ADMIN — INSULIN LISPRO 12 UNITS: 100 INJECTION, SOLUTION INTRAVENOUS; SUBCUTANEOUS at 16:38

## 2017-11-09 RX ADMIN — ENOXAPARIN SODIUM 30 MG: 100 INJECTION SUBCUTANEOUS at 16:39

## 2017-11-09 RX ADMIN — INSULIN LISPRO 6 UNITS: 100 INJECTION, SOLUTION INTRAVENOUS; SUBCUTANEOUS at 21:48

## 2017-11-09 RX ADMIN — METOCLOPRAMIDE 5 MG: 5 INJECTION, SOLUTION INTRAMUSCULAR; INTRAVENOUS at 21:47

## 2017-11-09 RX ADMIN — METOPROLOL TARTRATE 5 MG: 5 INJECTION INTRAVENOUS at 11:53

## 2017-11-09 RX ADMIN — CLONIDINE HYDROCHLORIDE 0.1 MG: 0.1 TABLET ORAL at 21:48

## 2017-11-09 RX ADMIN — PANTOPRAZOLE SODIUM 40 MG: 40 INJECTION, POWDER, FOR SOLUTION INTRAVENOUS at 21:47

## 2017-11-09 RX ADMIN — ERYTHROMYCIN LACTOBIONATE 250 MG: 500 INJECTION, POWDER, LYOPHILIZED, FOR SOLUTION INTRAVENOUS at 21:46

## 2017-11-09 NOTE — CONSULTS
[facility] [address]                                   CONSULTATION    PATIENT NAME: Bridget Pedro                     :        1970  MED REC NO:   [id number]                              ROOM:       [facility]  ACCOUNT NO:   [de-identified]                           ADMIT DATE: 2017  PROVIDER:     Hernando Seals DO      CONSULT DATE:  2017    HISTORY OF PRESENT ILLNESS:  The patient is a 79-year-old white female with  intractable nausea and vomiting, she has got a history of gastroparesis,  such has been longstanding. She had recent EGD and colonoscopy both  performed on 10/20, done by Dr. Cameron Cortez. The colonoscopy revealed colon  polyps. She did have H. pylori on her EGD. She was tried on bethanechol  to control her nausea and vomiting. She was originally admitted to the  hospital with a hypertensive crisis and headache. He has had recent  gastroparesis _____ diagnosed by a gastric emptying study done yesterday,  it showed no evidence of gastric emptying. Past medical, past surgical, social history, allergies, family history,  medications, laboratory studies, x-ray studies, vital signs were all  reviewed and available on [employer]. REVIEW OF SYSTEMS:  CONSTITUTIONAL:  Denies night sweats or appetite loss. EYES:  Denies eye pain or double vision. ENT:  Denies frequent nose bleeds or tinnitus. CARDIOVASCULAR:  Denies chest pain or tachyarrhythmia. RESPIRATORY:  Denies hemoptysis or shortness of breath. GASTROINTESTINAL:  Denies abdominal ischemia or vomiting blood. GENITOURINARY:  Denies dysuria or pyuria. MUSCULOSKELETAL:  Denies arthritis or movement difficulties. INTEGUMENTARY:  Denies pruritus or easy bruising. NEUROLOGICAL:  Denies total loss of taste or numbness. PSYCHIATRIC:  Denies episodic change in personality or expansive  personality.   ENDOCRINE:  There is a history of diabetes with gastric
Suzy Sam MD, 60 mg at 11/06/17 0753    hydrALAZINE (APRESOLINE) tablet 100 mg, 100 mg, Oral, TID, Carly Cottrell MD, 100 mg at 11/06/17 1500    insulin lispro (HUMALOG) injection vial 5 Units, 5 Units, Subcutaneous, TID WC, Carly Cottrell MD, 5 Units at 11/06/17 0200    isosorbide mononitrate (IMDUR) extended release tablet 120 mg, 120 mg, Oral, Daily, Carly Cottrell MD, 120 mg at 11/06/17 0751    levothyroxine (SYNTHROID) tablet 150 mcg, 150 mcg, Oral, Daily, Carly Cottrell MD, 150 mcg at 11/06/17 0750    metoprolol tartrate (LOPRESSOR) tablet 100 mg, 100 mg, Oral, BID, Carly Cottrell MD, 100 mg at 11/06/17 0800    ondansetron (ZOFRAN) tablet 4 mg, 4 mg, Oral, Q8H PRN, Carly Cottrell MD    rOPINIRole (REQUIP) tablet 2 mg, 2 mg, Oral, Nightly, Carly Cottrell MD, 2 mg at 11/05/17 2039    senna (SENOKOT) tablet 8.6 mg, 1 tablet, Oral, Daily, Carly Cottrell MD, 8.6 mg at 11/06/17 8874    simvastatin (ZOCOR) tablet 40 mg, 40 mg, Oral, Daily, Carly Cottrell MD, 40 mg at 11/06/17 0755    valsartan (DIOVAN) tablet 320 mg, 320 mg, Oral, Daily, Carly Cottrell MD, 320 mg at 11/06/17 0752    insulin glargine (LANTUS) injection vial 5 Units, 5 Units, Subcutaneous, Nightly, Carly Cottrell MD, 5 Units at 11/05/17 2045    NIFEdipine (PROCARDIA XL) extended release tablet 60 mg, 60 mg, Oral, Daily, Carly Cottrell MD, 60 mg at 11/06/17 0753    nitroGLYCERIN (NITROSTAT) SL tablet 0.4 mg, 0.4 mg, Sublingual, Q5 Min PRN, Donnell Wright MD, 0.4 mg at 11/04/17 1315    sodium chloride flush 0.9 % injection 10 mL, 10 mL, Intravenous, 2 times per day, Carly Cottrell MD, 10 mL at 11/05/17 0955    sodium chloride flush 0.9 % injection 10 mL, 10 mL, Intravenous, PRN, Carly Cottrell MD    acetaminophen (TYLENOL) tablet 650 mg, 650 mg, Oral, Q4H PRN, Carly Cottrell MD, 650 mg at 11/05/17 2050    magnesium hydroxide (MILK OF MAGNESIA) 400 MG/5ML suspension 30 mL, 30 mL, Oral, Daily

## 2017-11-09 NOTE — PROGRESS NOTES
Nephrology ([facility]) Progress Note    Patient:  Leonarda Apgar  YOB: 1970  Date of Service: 2017  MRN: [id number]   Acct: [de-identified]   Primary Care Physician: ROMINA Blanco  Advance Directive: Full Code  Admit Date: 2017       Hospital Day: 5  Referring Provider: Lamar Mendez MD    Patient Seen, Chart, Consults, Notes, Labs, Radiology studies reviewed. Subjective:  Leonarda Apgar is a 49 y.o. female  whom we were consulted for ESRD management. Patient was admitted for hypertensive crisis. She was having intractable nausea and vomiting and it was due to her severe gastroparesis. She was seen by GI and started on IV protonix and Reglan. Today, she was seen and examined on dialysis. She was doing better, mostly denied nausea.         Dialysis   Pt was seen on RRT  Modality: Hemodialysis  Access: Arterial Venous Fistula  Location: left upper  QB: 500  QD: 600  UF: 3 liters    Allergies:  Lamisil advanced [terbinafine hcl] and Penicillins    Medicines:  Current Facility-Administered Medications   Medication Dose Route Frequency Provider Last Rate Last Dose    metoprolol (LOPRESSOR) injection 5 mg  5 mg Intravenous Q6H PRN Lisandro Henderson PA-C   5 mg at 17 2340    pantoprazole (PROTONIX) injection 40 mg  40 mg Intravenous BID Eder Johnson DO   40 mg at 172    And    sodium chloride (PF) 0.9 % injection 10 mL  10 mL Intravenous Daily Eder Johnson DO   10 mL at 17 1059    erythromycin (ERYTHROCIN) 250 mg in sodium chloride 0.9 % 100 mL IVPB  250 mg Intravenous Q8H Lisandro Henderson PA-C   Stopped at 17 0451    hydrALAZINE (APRESOLINE) injection 10 mg  10 mg Intravenous Q6H MICHELLE PrescottC   10 mg at 17 0227    diphenhydrAMINE (BENADRYL) injection 25 mg  25 mg Intravenous Q6H PRN Lisandro Henderson PA-C        metoclopramide (REGLAN) injection 5 mg  5 mg Intravenous 4 times per day Eder Johnson DO   5 mg at 11/09/17 0553    technetium sulfur colloid (NYCOMED-SC) solution 5 millicurie  5 millicurie Intravenous ONCE PRN Jess Nichole MD        hydrALAZINE (APRESOLINE) injection 10 mg  10 mg Intravenous Q6H PRN Agueda Brady MD   10 mg at 11/08/17 0602    glucose (GLUTOSE) 40 % oral gel 15 g  15 g Oral PRN Agueda Brady MD        dextrose 50 % solution 12.5 g  12.5 g Intravenous PRN Agueda Brady MD        glucagon (rDNA) injection 1 mg  1 mg Intramuscular PRN Agueda Brady MD        dextrose 5 % solution  100 mL/hr Intravenous PRN Agueda Brady MD        insulin lispro (HUMALOG) injection vial 0-12 Units  0-12 Units Subcutaneous TID WC Agueda Brady MD   4 Units at 11/08/17 1222    insulin lispro (HUMALOG) injection vial 0-6 Units  0-6 Units Subcutaneous Nightly Agueda Brady MD   4 Units at 11/08/17 2032    ondansetron (ZOFRAN) tablet 4 mg  4 mg Oral Q8H PRN Ladan Ridley MD        nitroGLYCERIN (NITROSTAT) SL tablet 0.4 mg  0.4 mg Sublingual Q5 Min PRN Jorje Lucas MD   0.4 mg at 11/04/17 1315    sodium chloride flush 0.9 % injection 10 mL  10 mL Intravenous 2 times per day Ladan Ridley MD   10 mL at 11/08/17 2038    sodium chloride flush 0.9 % injection 10 mL  10 mL Intravenous PRN Ladan Ridley MD   10 mL at 11/08/17 0603    acetaminophen (TYLENOL) tablet 650 mg  650 mg Oral Q4H PRN Ladan Ridley MD   650 mg at 11/07/17 2115    magnesium hydroxide (MILK OF MAGNESIA) 400 MG/5ML suspension 30 mL  30 mL Oral Daily PRN Ladan Ridley MD        ondansetron [facility]) injection 4 mg  4 mg Intravenous Q6H PRN Ladan Ridley MD   4 mg at 11/08/17 0932    enoxaparin (LOVENOX) injection 30 mg  30 mg Subcutaneous Daily Ladan Ridley MD   30 mg at 11/08/17 1706    nicotine (NICODERM CQ) 14 MG/24HR 1 patch  1 patch Transdermal Daily Ladan Ridley MD   1 patch at 11/07/17 1228       Past Medical History:  Past Medical History:   Diagnosis Date above.      Objective:  /89   HR 85    Intake/Output Summary (Last 24 hours) at 11/09/17 0910  Last data filed at 11/09/17 0839   Gross per 24 hour   Intake              103 ml   Output              300 ml   Net             -197 ml     General: awake/alert   Chest:  clear to auscultation bilaterally without respiratory distress  CVS: regular rate and rhythm  Abdominal: soft, nontender, normal bowel sounds  Extremities: no cyanosis or edema  Skin: warm and dry without rash    Labs:  BMP: Recent Labs      11/07/17   0401 11/08/17 0404 11/09/17 0125   NA  132*  136  134*   K  6.3*  4.6  4.0   CL  87*  90*  89*   CO2  30*  24  28   PHOS   --    --   5.0*   BUN  39*  25*  34*   CREATININE  7.8*  5.2*  6.9*   CALCIUM  8.7  9.0  8.8     CBC: Recent Labs      11/07/17   0401 11/08/17 0404 11/09/17 0125   WBC  4.4*  4.4*  4.6*   HGB  9.7*  10.8*  9.9*   HCT  30.2*  33.4*  30.3*   MCV  95.3  94.6  94.4   PLT  133  153  150     LIVER PROFILE: No results for input(s): AST, ALT, LIPASE, BILIDIR, BILITOT, ALKPHOS in the last 72 hours. Invalid input(s): AMYLASE,  ALB  PT/INR: No results for input(s): PROTIME, INR in the last 72 hours. APTT: No results for input(s): APTT in the last 72 hours. BNP:  No results for input(s): BNP in the last 72 hours. Ionized Calcium:No results for input(s): IONCA in the last 72 hours. Magnesium:  Recent Labs      11/09/17 0125   MG  2.3     Phosphorus:  Recent Labs      11/09/17 0125   PHOS  5.0*     HgbA1C: No results for input(s): LABA1C in the last 72 hours. Hepatic: No results for input(s): ALKPHOS, ALT, AST, PROT, BILITOT, BILIDIR, LABALBU in the last 72 hours. Lactic Acid: No results for input(s): LACTA in the last 72 hours. Troponin: No results for input(s): CKTOTAL, CKMB, TROPONINT in the last 72 hours. ABGs: No results for input(s): PH, PCO2, PO2, HCO3, O2SAT in the last 72 hours. CRP:  No results for input(s): CRP in the last 72 hours.   Sed Rate:  No

## 2017-11-09 NOTE — PROGRESS NOTES
[facility]      Patient:    Lara Robin  YOB: 1970  Date of Service:  11/9/2017  MRN:    [id number]    Room:   [address] [place] [address]   PCP:    ROMINA Francis  Advance Directive:  Full Code  Admit Date:   11/4/2017       Hospital Day:  5    Chief Complaint:  Nausea and vomiting    Subjective:  Patient states she is feeling much better this morning. She is seen in HD and is tolerating well. No vomiting since yesterday morning. She states was on Reglan previously but began having falls. Cumulative Hospital Course:              Ms. Tiana Wetzel is a 49year old female who was admitted to Hospitalist service after she presented to the emergency department with headache and hypertensive emergency. CT head was unremarkable. She was started on a Cardene drip and Nephrology was consulted for known ESRD. Cardene was weaned off and her home medications were resumed with addition of Minoxidil. A gastric emptying study was concerning for gastroparesis vs gastric outlet obstruction on 11/07/2017. Erythromycin was added to her medication regimen. She appeared cachectic on 11/07/2017 and had complaint of intractable nausea and vomiting. Gastroenterology was consulted, erythromycin was changed to IV and nursing communication given to hold all oral medications and that she be made NPO for now. Dr. Bhatt May added IV protonix and IV reglan to medication regimen. The patient had significant improvement in nausea and vomiting. TSH found to be 18.94. She states that it has been weeks since she has been able to digest her medications, frequently vomiting all of her pills. Synthroid IV is ordered. Review of Systems:   Constitutional / general:  Denies fever / chills / sweats  Head:  + Headache, improved today.   Denies neck stiffness / trauma / visual change  Eyes:  Denies blurry vision / acute visual change or loss / itching / redness  ENT: Denies sore throat / hoarseness / nasal drainage / ear enlargment  Neurologic: Generalized weakness, no focal deficits noted. Psychiatric: Alert and oriented, thought content appropriate, normal insight, mood appropriate      Medications:      dextrose        levothyroxine  75 mcg Intravenous QAM AC    pantoprazole  40 mg Intravenous BID    And    sodium chloride (PF)  10 mL Intravenous Daily    erythromycin  250 mg Intravenous Q8H    hydrALAZINE  10 mg Intravenous Q6H    metoclopramide  5 mg Intravenous 4 times per day    insulin lispro  0-12 Units Subcutaneous TID WC    insulin lispro  0-6 Units Subcutaneous Nightly    sodium chloride flush  10 mL Intravenous 2 times per day    enoxaparin  30 mg Subcutaneous Daily    nicotine  1 patch Transdermal Daily     metoprolol, diphenhydrAMINE, technetium sulfur colloid, hydrALAZINE, glucose, dextrose, glucagon (rDNA), dextrose, ondansetron, nitroGLYCERIN, sodium chloride flush, acetaminophen, magnesium hydroxide, ondansetron  Diet NPO Effective Now     Lab and other Data:     Recent Labs      11/07/17   0401  11/08/17   0404  11/09/17   0125   WBC  4.4*  4.4*  4.6*   HGB  9.7*  10.8*  9.9*   PLT  133  153  150     Recent Labs      11/07/17   0401  11/08/17   0404  11/09/17   0125   NA  132*  136  134*   K  6.3*  4.6  4.0   CL  87*  90*  89*   CO2  30*  24  28   BUN  39*  25*  34*   CREATININE  7.8*  5.2*  6.9*   GLUCOSE  221*  177*  273*       RAD:   Gastric Emptying Study - 11/7/17  An abnormal gastric emptying study suggesting a gastric   outlet obstruction or gastroparesis.    Signed by Dr Sol Ochoa on 11/7/2017 3:19 PM       Problem List:     Patient Active Problem List    Diagnosis Date Noted    Acute renal failure with tubular necrosis ([address] 05/17/2017     Priority: High     Class: Acute    Family history of colon cancer 10/20/2017    Unintentional weight loss 10/20/2017    ESRD (end stage renal disease) ([address]     ESRD on hemodialysis ([address] 05/23/2017    Hyperkalemia 05/23/2017    Anemia in chronic kidney disease (CKD) 05/23/2017    Iron deficiency 05/23/2017    Hypoglycemia 05/22/2017    Chronic kidney disease, stage 5, kidney failure (HCC)     Gastroparesis     GERD (gastroesophageal reflux disease)     Hypertensive emergency     Hypothyroid     Non-intractable vomiting with nausea 11/04/2016    Chronic constipation 11/04/2016    DM (diabetes mellitus) type II controlled with renal manifestation ([facility] 06/01/1993       Assessment and Plan:     Principal Problem:    Hypertensive emergency - Was on Cardene. Now on scheduled IV hydralazine and prn IV lopressor. If tolerating oral, resume oral antihypertensives and wean IV. Active Problems:    Gastroparesis - IV reglan per Dr. Dilma Almonte. Chronic constipation - Home medications of colace and Senna. DM (diabetes mellitus) type II controlled with renal manifestation (HCC)    Hypothyroid - TSH 18.94. Has not been able to keep down oral synthroid at home. IV Synthroid until resuming po. Hyperkalemia - Resolved. Anemia in chronic kidney disease (CKD) - Follow H/H. Stable. ESRD (end stage renal disease) ([facility] - Continue routine HD. Start clear liquids. Resume oral medications when tolerating po. AM labs. DVT Prophylaxis:  Lovenox  GI prophylaxis:  Protonix    Derick Del Rio PA-C  11/9/2017     Patient was examined independently. Chart reviewed and events noted.     No new complaints other than stated above.     Vitals noted. Blood pressure remains elevated. Chest CTAB. S1 S2 RRR. GI bening. Neuro no new deficits. Integumentary no rash.     Labs noted.      Nephrology input noted and agreed. Trial of Erythromycin failed. GI input noted. Reglan started with good results. Probably home tomorrow.     Agree with above assessment and plan.  Will continue to follow.     Aracely Buenrostro MD  Hospitalist service  11/9/2017

## 2017-11-09 NOTE — PROGRESS NOTES
Patient's blood pressure elevated all during night. Dr. Violetta Casarez notified and another dose of iv Hydralazine ordered. Patient has been NPO since midnight for possible EGD this am. Bed in low position. Call light within reach.

## 2017-11-09 NOTE — PROGRESS NOTES
Nutrition Assessment    Type and Reason for Visit: Reassess    Nutrition Recommendations: meet nutritional needs through po intake     Malnutrition Assessment:  · Malnutrition Status: At risk for malnutrition    Nutrition Diagnosis:   · Problem: Inadequate oral intake  · Etiology: related to Insufficient energy/nutrient consumption     Signs and symptoms:  as evidenced by NPO status due to medical condition    Nutrition Assessment:  · Subjective Assessment: Pt was carb/renal, now NPO due to possible EGD today. report of difficulty chewing. · Nutrition-Focused Physical Findings:    · Wound Type: None  · Current Nutrition Therapies:  · Oral Diet Orders: NPO   · Oral Diet intake: NPO  · Oral Nutrition Supplement (ONS) Orders: None  · ONS intake:    · Anthropometric Measures:  · Ht: 5' 6\" (167.6 cm)   · Current Body Wt: 161 lb (73 kg)  · Admission Body Wt: 170 lb (77.1 kg)  · Usual Body Wt:    · % Weight Change:  ,     · Ideal Body Wt: 130 lb (59 kg),   · BMI Classification: BMI 25.0 - 29.9 Overweight  · Comparative Standards (Estimated Nutrition Needs):  · Estimated Daily Total Kcal:    · Estimated Daily Protein (g):      Estimated Intake vs Estimated Needs: Intake Less Than Needs    Nutrition Risk Level: Moderate    Nutrition Interventions:   Continue current diet  Continued Inpatient Monitoring    Nutrition Evaluation:   · Evaluation: No progress toward goals   · Goals: meet nutritional needs through po intake    · Monitoring: NPO Status, Chewing/Swallowing, Fluid Balance, Weight, Pertinent Labs    See Adult Nutrition Doc Flowsheet for more detail.      Electronically signed by Tod Macias MS, RD, LD on 11/9/17 at 10:59 AM    Contact Number: [phone]

## 2017-11-10 VITALS
DIASTOLIC BLOOD PRESSURE: 78 MMHG | TEMPERATURE: 97.1 F | WEIGHT: 160.4 LBS | OXYGEN SATURATION: 97 % | BODY MASS INDEX: 25.78 KG/M2 | SYSTOLIC BLOOD PRESSURE: 134 MMHG | RESPIRATION RATE: 16 BRPM | HEIGHT: 66 IN | HEART RATE: 69 BPM

## 2017-11-10 LAB
ANION GAP SERPL CALCULATED.3IONS-SCNC: 14 MMOL/L (ref 7–19)
BASOPHILS ABSOLUTE: 0.1 K/UL (ref 0–0.2)
BASOPHILS RELATIVE PERCENT: 1.2 % (ref 0–1)
BUN BLDV-MCNC: 18 MG/DL (ref 6–20)
CALCIUM SERPL-MCNC: 8.9 MG/DL (ref 8.6–10)
CHLORIDE BLD-SCNC: 93 MMOL/L (ref 98–111)
CO2: 28 MMOL/L (ref 22–29)
CREAT SERPL-MCNC: 4.6 MG/DL (ref 0.5–0.9)
EOSINOPHILS ABSOLUTE: 0.1 K/UL (ref 0–0.6)
EOSINOPHILS RELATIVE PERCENT: 3.2 % (ref 0–5)
GFR NON-AFRICAN AMERICAN: 10
GLUCOSE BLD-MCNC: 180 MG/DL (ref 70–99)
GLUCOSE BLD-MCNC: 204 MG/DL (ref 74–109)
GLUCOSE BLD-MCNC: 221 MG/DL (ref 70–99)
GLUCOSE BLD-MCNC: 303 MG/DL (ref 70–99)
HCT VFR BLD CALC: 30.3 % (ref 37–47)
HEMOGLOBIN: 10 G/DL (ref 12–16)
LYMPHOCYTES ABSOLUTE: 1.3 K/UL (ref 1.1–4.5)
LYMPHOCYTES RELATIVE PERCENT: 29 % (ref 20–40)
MCH RBC QN AUTO: 30.7 PG (ref 27–31)
MCHC RBC AUTO-ENTMCNC: 33 G/DL (ref 33–37)
MCV RBC AUTO: 92.9 FL (ref 81–99)
MONOCYTES ABSOLUTE: 0.5 K/UL (ref 0–0.9)
MONOCYTES RELATIVE PERCENT: 11.4 % (ref 0–10)
NEUTROPHILS ABSOLUTE: 2.4 K/UL (ref 1.5–7.5)
NEUTROPHILS RELATIVE PERCENT: 55 % (ref 50–65)
PDW BLD-RTO: 13.2 % (ref 11.5–14.5)
PERFORMED ON: ABNORMAL
PLATELET # BLD: 158 K/UL (ref 130–400)
PMV BLD AUTO: 9.3 FL (ref 9.4–12.3)
POTASSIUM SERPL-SCNC: 4.1 MMOL/L (ref 3.5–5)
RBC # BLD: 3.26 M/UL (ref 4.2–5.4)
SODIUM BLD-SCNC: 135 MMOL/L (ref 136–145)
WBC # BLD: 4.3 K/UL (ref 4.8–10.8)

## 2017-11-10 PROCEDURE — 6360000002 HC RX W HCPCS: Performed by: PHYSICIAN ASSISTANT

## 2017-11-10 PROCEDURE — C9113 INJ PANTOPRAZOLE SODIUM, VIA: HCPCS | Performed by: INTERNAL MEDICINE

## 2017-11-10 PROCEDURE — 36415 COLL VENOUS BLD VENIPUNCTURE: CPT

## 2017-11-10 PROCEDURE — 85025 COMPLETE CBC W/AUTO DIFF WBC: CPT

## 2017-11-10 PROCEDURE — 2580000003 HC RX 258: Performed by: INTERNAL MEDICINE

## 2017-11-10 PROCEDURE — 6360000002 HC RX W HCPCS: Performed by: INTERNAL MEDICINE

## 2017-11-10 PROCEDURE — 6370000000 HC RX 637 (ALT 250 FOR IP): Performed by: PHYSICIAN ASSISTANT

## 2017-11-10 PROCEDURE — 6370000000 HC RX 637 (ALT 250 FOR IP): Performed by: FAMILY MEDICINE

## 2017-11-10 PROCEDURE — 82948 REAGENT STRIP/BLOOD GLUCOSE: CPT

## 2017-11-10 PROCEDURE — 80048 BASIC METABOLIC PNL TOTAL CA: CPT

## 2017-11-10 PROCEDURE — 99239 HOSP IP/OBS DSCHRG MGMT >30: CPT | Performed by: FAMILY MEDICINE

## 2017-11-10 PROCEDURE — 2500000003 HC RX 250 WO HCPCS: Performed by: PHYSICIAN ASSISTANT

## 2017-11-10 PROCEDURE — 2580000003 HC RX 258: Performed by: PHYSICIAN ASSISTANT

## 2017-11-10 RX ORDER — METOPROLOL TARTRATE 50 MG/1
100 TABLET, FILM COATED ORAL 2 TIMES DAILY
Status: DISCONTINUED | OUTPATIENT
Start: 2017-11-10 | End: 2017-11-10 | Stop reason: HOSPADM

## 2017-11-10 RX ORDER — METOCLOPRAMIDE 10 MG/1
5 TABLET ORAL
Status: DISCONTINUED | OUTPATIENT
Start: 2017-11-10 | End: 2017-11-10 | Stop reason: HOSPADM

## 2017-11-10 RX ORDER — METOCLOPRAMIDE 5 MG/1
5 TABLET ORAL
Qty: 120 TABLET | Refills: 0 | Status: SHIPPED | OUTPATIENT
Start: 2017-11-10 | End: 2018-01-02 | Stop reason: SDUPTHER

## 2017-11-10 RX ORDER — INSULIN GLARGINE 100 [IU]/ML
5 INJECTION, SOLUTION SUBCUTANEOUS NIGHTLY
Status: DISCONTINUED | OUTPATIENT
Start: 2017-11-10 | End: 2017-11-10 | Stop reason: HOSPADM

## 2017-11-10 RX ORDER — LEVOTHYROXINE SODIUM 0.07 MG/1
150 TABLET ORAL DAILY
Status: DISCONTINUED | OUTPATIENT
Start: 2017-11-11 | End: 2017-11-10 | Stop reason: HOSPADM

## 2017-11-10 RX ADMIN — NIFEDIPINE 60 MG: 60 TABLET, FILM COATED, EXTENDED RELEASE ORAL at 08:15

## 2017-11-10 RX ADMIN — METOCLOPRAMIDE 5 MG: 5 INJECTION, SOLUTION INTRAMUSCULAR; INTRAVENOUS at 06:17

## 2017-11-10 RX ADMIN — ERYTHROMYCIN LACTOBIONATE 250 MG: 500 INJECTION, POWDER, LYOPHILIZED, FOR SOLUTION INTRAVENOUS at 02:06

## 2017-11-10 RX ADMIN — INSULIN LISPRO 8 UNITS: 100 INJECTION, SOLUTION INTRAVENOUS; SUBCUTANEOUS at 12:07

## 2017-11-10 RX ADMIN — CLONIDINE HYDROCHLORIDE 0.1 MG: 0.1 TABLET ORAL at 16:27

## 2017-11-10 RX ADMIN — Medication 10 ML: at 08:17

## 2017-11-10 RX ADMIN — PANTOPRAZOLE SODIUM 40 MG: 40 INJECTION, POWDER, FOR SOLUTION INTRAVENOUS at 08:15

## 2017-11-10 RX ADMIN — INSULIN LISPRO 4 UNITS: 100 INJECTION, SOLUTION INTRAVENOUS; SUBCUTANEOUS at 08:13

## 2017-11-10 RX ADMIN — CLONIDINE HYDROCHLORIDE 0.1 MG: 0.1 TABLET ORAL at 08:15

## 2017-11-10 RX ADMIN — INSULIN LISPRO 5 UNITS: 100 INJECTION, SOLUTION INTRAVENOUS; SUBCUTANEOUS at 08:14

## 2017-11-10 RX ADMIN — LEVOTHYROXINE SODIUM ANHYDROUS 75 MCG: 100 INJECTION, POWDER, LYOPHILIZED, FOR SOLUTION INTRAVENOUS at 06:17

## 2017-11-10 RX ADMIN — METOCLOPRAMIDE 5 MG: 5 INJECTION, SOLUTION INTRAMUSCULAR; INTRAVENOUS at 02:07

## 2017-11-10 RX ADMIN — METOPROLOL TARTRATE 100 MG: 50 TABLET ORAL at 08:15

## 2017-11-10 RX ADMIN — ERYTHROMYCIN LACTOBIONATE 250 MG: 500 INJECTION, POWDER, LYOPHILIZED, FOR SOLUTION INTRAVENOUS at 12:06

## 2017-11-10 RX ADMIN — HYDRALAZINE HYDROCHLORIDE 10 MG: 20 INJECTION INTRAMUSCULAR; INTRAVENOUS at 02:06

## 2017-11-10 RX ADMIN — HYDRALAZINE HYDROCHLORIDE 10 MG: 20 INJECTION INTRAMUSCULAR; INTRAVENOUS at 06:17

## 2017-11-10 RX ADMIN — INSULIN LISPRO 5 UNITS: 100 INJECTION, SOLUTION INTRAVENOUS; SUBCUTANEOUS at 12:07

## 2017-11-10 RX ADMIN — METOCLOPRAMIDE HYDROCHLORIDE 5 MG: 10 TABLET ORAL at 12:06

## 2017-11-10 ASSESSMENT — PAIN SCALES - GENERAL: PAINLEVEL_OUTOF10: 0

## 2017-11-10 NOTE — DISCHARGE SUMMARY
[facility]      Hospitalist Discharge Summary    Trent Gracia  :  1970  MRN:  [id number]    Admit date:  2017  Discharge date:  11/10/17    Admitting Physician:  Jono Ron MD    Primary Care Physician:  ROMINA Polk    Consultants:  Dr. Dm Irving, Nephrology; Dr. Dilma Almonte, Gastroenterology    Discharge Diagnoses:  Principal Problem:    Hypertensive emergency  Active Problems:    Chronic constipation    DM (diabetes mellitus) type II controlled with renal manifestation (HCC)    Gastroparesis    Hypothyroid    Hyperkalemia    Anemia in chronic kidney disease (CKD)    ESRD (end stage renal disease) [facility])      Hospital Course: The patient is a pleasant 49year old female with a PMH significant for ESRD on HD, HTN, DM2 with gastroparesis who presented to the hospital with a headache and elevated blood pressure. CT of the head was obtained and was unremarkable. She was admitted to the ICU with hypertensive emergency and placed on Cardene drip. Nephrology was consulted for continuation of hemodialysis. The patient was weaned off of Cardene. Home antihypertensives were resumed. She reports difficulty with nausea and vomiting at home and inability to hold down her oral medications. She reports vomiting undigested pills for several weeks at home. A gastric emptying study was obtained and concerning for gastroparesis vs gastric outlet obstruction. Gastroenterology was consulted. The patient was noted to have intractable nausea and vomiting. Medications were transitioned to IV. The patient was placed on scheduled IV reglan with resolution of nausea and vomiting. She has transitioned to oral reglan and is tolerating well. Home medications were resumed. TSH was obtained and found to be elevated at 18. This is likely elevated due to patient being unable to keep her medications down without vomiting over the last several weeks or more.   She will continue on her current synthroid dose with a repeat TSH in 6 weeks with her PCP. This was discussed with the patient. The patient is tolerating her current diet without difficulty. She is anxious for discharge. Her blood pressure is significantly resumed. Vital signs are stable. She lives with her boyfriend and her son. Home health was discussed, but she declined. She will be discharged today with follow up as instructed. Physical Examination:  VITALS:  /78   Pulse 69   Temp 97.1 °F (36.2 °C) (Temporal)   Resp 16   Ht 5' 6\" (1.676 m)   Wt 160 lb 6.4 oz (72.8 kg)   SpO2 97%   BMI 25.89 kg/m²   24HR INTAKE/OUTPUT:      Intake/Output Summary (Last 24 hours) at 11/10/17 1724  Last data filed at 11/10/17 1404   Gross per 24 hour   Intake             2160 ml   Output              300 ml   Net             1860 ml     Constitutional: 48 y/o female, Well-developed and well-nourished. No distress. HENT:    Head: Normocephalic and atraumatic.    Mouth/Throat: Oropharynx is clear and moist. No oropharyngeal exudate. Eyes: Conjunctivae and EOM are normal. Pupils are equal, round, and reactive to light. No scleral icterus. Neck: Normal range of motion. Neck supple. No JVD present. No tracheal deviation present. No thyromegaly present. Cardiovascular: Normal rate, regular rhythm and normal heart sounds.  Exam reveals no gallop and no friction rub.   No murmur heard. Pulmonary/Chest: Effort normal and breath sounds normal. No stridor. No respiratory distress. No wheezes. No rales. Abdominal: Soft. Bowel sounds are normal. No distension and no mass. There is no tenderness. There is no rebound and no guarding. Musculoskeletal: Moves all extremities. There is no edema or tenderness. Lymphadenopathy: No cervical adenopathy. Neurological: CN II-XII grossly intact. No focal deficits. Skin: Skin is warm and dry. Not diaphoretic. Psychiatric: Normal mood and affect.  Behavior is normal. Judgment and thought content normal.     Significant Diagnostic Studies:    Gastric Emptying - 11/7/17  An abnormal gastric emptying study suggesting a gastric   outlet obstruction or gastroparesis. Signed by Dr Siomara Adhikari on 11/7/2017 3:19 PM     Chest Xray - 11/4/17  1. Borderline cardiomegaly with mild prominence of the pulmonary   vasculature. No interstitial or carie pulmonary edema. 2. No focal lung infiltrates. Signed by Dr Gabriela Camarena on 11/4/2017 1:51 PM     CT Head - 11/4/17  1. No acute intracranial process.    Signed by Dr Gabriela Camarena on 11/4/2017 12:49 PM     Lab Results   Component Value Date    WBC 4.3 (L) 11/10/2017    HGB 10.0 (L) 11/10/2017    HCT 30.3 (L) 11/10/2017    MCV 92.9 11/10/2017     11/10/2017     Lab Results   Component Value Date     11/10/2017    K 4.1 11/10/2017    CL 93 11/10/2017    CO2 28 11/10/2017    BUN 18 11/10/2017    CREATININE 4.6 11/10/2017    GLUCOSE 204 11/10/2017    CALCIUM 8.9 11/10/2017        Discharge Medications:       Ema Tinoco   Home Medication Instructions [id number]    Printed on:11/10/17 7055   Medication Information                      amLODIPine (NORVASC) 5 MG tablet  Take 1 tablet by mouth daily             bethanechol (URECHOLINE) 25 MG tablet  Take 1 tablet by mouth 4 times daily (with meals and nightly)             Cholecalciferol (VITAMIN D3) 45843 UNITS CAPS  Take 1 capsule by mouth once a week On Mondays             cloNIDine (CATAPRES) 0.1 MG tablet  Take 0.1 mg by mouth             docusate sodium (STOOL SOFTENER) 100 MG capsule  Take 300 mg by mouth nightly              DULoxetine (CYMBALTA) 60 MG extended release capsule  Take 60 mg by mouth daily             hydrALAZINE (APRESOLINE) 100 MG tablet  Take 1 tablet by mouth 3 times daily             insulin aspart (NOVOLOG FLEXPEN) 100 UNIT/ML injection pen  Inject 5 Units into the skin 3 times daily             insulin detemir (LEVEMIR FLEXTOUCH) 100 UNIT/ML injection pen  Inject 5 Units into the skin nightly             isosorbide mononitrate (IMDUR) 120 MG extended release tablet  Take 1 tablet by mouth daily             levothyroxine (SYNTHROID) 150 MCG tablet  Take 150 mcg by mouth Daily             metoclopramide (REGLAN) 5 MG tablet  Take 1 tablet by mouth 4 times daily (before meals and nightly)             metoprolol (LOPRESSOR) 100 MG tablet  Take 100 mg by mouth 2 times daily             NIFEdipine (ADALAT CC) 60 MG extended release tablet  Take 60 mg by mouth daily             Nutritional Supplements (GLUCOSE MANAGEMENT) TABS  Take by mouth             ondansetron (ZOFRAN) 4 MG tablet  Take 4 mg by mouth every 8 hours as needed for Nausea or Vomiting             rOPINIRole (REQUIP) 2 MG tablet  Take 2 mg by mouth nightly             senna (SENOKOT) 8.6 MG TABS tablet  Take by mouth             simvastatin (ZOCOR) 40 MG tablet  Take 40 mg by mouth daily             valsartan (DIOVAN) 320 MG tablet  Take 320 mg by mouth daily                 Disposition:   Home    Diet:  Carb Control; Renal Diet    Follow Up Instructions: Follow up with ROMINA Polk in 2-3 days. Follow up with Nephrology at HD clinic as scheduled. Follow up with Dr. Leanne Coates in 3-4 weeks. Signed:  Juan Omer PA-C  11/10/2017     Patient was examined and interviewed independently. Chart reviewed and events noted. No new complaints other than stated above. Nausea resolved with Reglan, tolerated well without any noticeable side effect. Vitals noted. Chest CTAB. S1 S2 RRR. GI benign. Neuro no new deficits. Labs noted. Follow BMP/CBC outpatient per PCP. HD as previously scheduled, blood pressure follow up with PCP. Agree with above assessment and plan. Discharge to home  Discharge time 49 minutes, including chart review, counseling, medication reconciliation, case discussion, patient exam, follow up and discharge planning.      Aracely Buenrostro MD  Hospitalist service  11/10/2017  5:36 PM

## 2017-11-10 NOTE — PROGRESS NOTES
Patient discharged home. Home medication given back to patient. Prescription x 1 given for reglan. Patient to follow up with primary. IV access discontinued/Telemetry discontinued.

## 2017-11-10 NOTE — PROGRESS NOTES
Nephrology ([facility]) Progress Note    Patient:  Ayesha Johnson  YOB: 1970  Date of Service: 11/10/2017  MRN: [id number]   Acct: [de-identified]   Primary Care Physician: ROMINA Cruz  Advance Directive: Full Code  Admit Date: 11/4/2017       Hospital Day: 6  Referring Provider: Melia Nolasco MD    Patient Seen, Chart, Consults notes, Labs, Radiology studies reviewed. Subjective:  Ayesha Johnson is a 49 y.o. female  whom we were consulted for ESRD management. Patient was admitted for hypertensive crisis. She was having intractable nausea and vomiting and it was due to her severe gastroparesis. She was seen by GI and started on IV protonix and Reglan. Today, she was doing better with no nausea or headache and was restarted on her oral BP meds.     Allergies:  Lamisil advanced [terbinafine hcl] and Penicillins    Medicines:  Current Facility-Administered Medications   Medication Dose Route Frequency Provider Last Rate Last Dose    metoprolol tartrate (LOPRESSOR) tablet 100 mg  100 mg Oral BID HECTOR Gonzalez-C   100 mg at 11/10/17 0815    metoclopramide (REGLAN) tablet 5 mg  5 mg Oral 4x Daily AC & HS Tomer Mccarthy PA-C        insulin glargine (LANTUS) injection vial 5 Units  5 Units Subcutaneous Nightly Tomer Mccarthy PA-C        insulin lispro (HUMALOG) injection vial 5 Units  5 Units Subcutaneous TID WC Tomer Mccarthy PA-C   5 Units at 11/10/17 1557    levothyroxine (SYNTHROID) injection 75 mcg  75 mcg Intravenous QAM AC Tomer Mccarthy PA-C   75 mcg at 11/10/17 0382    cloNIDine (CATAPRES) tablet 0.1 mg  0.1 mg Oral TID HECTOR Gonzalez-C   0.1 mg at 11/10/17 0815    NIFEdipine (PROCARDIA XL) extended release tablet 60 mg  60 mg Oral Daily HECTOR Gonzalez-C   60 mg at 11/10/17 0815    metoprolol (LOPRESSOR) injection 5 mg  5 mg Intravenous Q6H PRN HECTOR Christine-C   5 mg at 11/09/17 1153    pantoprazole (PROTONIX) injection 40 mg  40 mg Intravenous BID Neil  Angles, DO   40 mg at 11/10/17 0815    And    sodium chloride (PF) 0.9 % injection 10 mL  10 mL Intravenous Daily Neil  Angles, DO   10 mL at 11/10/17 0817    erythromycin (ERYTHROCIN) 250 mg in sodium chloride 0.9 % 100 mL IVPB  250 mg Intravenous Q8H Josselyn July, SHA   Stopped at 11/10/17 0251    diphenhydrAMINE (BENADRYL) injection 25 mg  25 mg Intravenous Q6H PRN Josselyn JulySHA        technetium sulfur colloid (NYCOMED-SC) solution 5 millicurie  5 millicurie Intravenous ONCE PRN Edu Callaway MD        hydrALAZINE (APRESOLINE) injection 10 mg  10 mg Intravenous Q6H PRN Kelly Alcantara MD   10 mg at 11/09/17 1410    glucose (GLUTOSE) 40 % oral gel 15 g  15 g Oral PRN Kelly Alcantara MD        dextrose 50 % solution 12.5 g  12.5 g Intravenous PRN Kelly Alcantara MD        glucagon (rDNA) injection 1 mg  1 mg Intramuscular PRN Kelly Alcantara MD        dextrose 5 % solution  100 mL/hr Intravenous PRN Kelly Alcantara MD        insulin lispro (HUMALOG) injection vial 0-12 Units  0-12 Units Subcutaneous TID WC Kelly Alcantara MD   4 Units at 11/10/17 0813    insulin lispro (HUMALOG) injection vial 0-6 Units  0-6 Units Subcutaneous Nightly Kelly Alcantara MD   6 Units at 11/09/17 2148    ondansetron (ZOFRAN) tablet 4 mg  4 mg Oral Q8H PRN Silviano Garza MD        nitroGLYCERIN (NITROSTAT) SL tablet 0.4 mg  0.4 mg Sublingual Q5 Min PRN Jake Powell MD   0.4 mg at 11/04/17 1315    sodium chloride flush 0.9 % injection 10 mL  10 mL Intravenous 2 times per day Silviano Garza MD   10 mL at 11/10/17 0817    sodium chloride flush 0.9 % injection 10 mL  10 mL Intravenous PRN Silviano Garza MD   10 mL at 11/08/17 0603    acetaminophen (TYLENOL) tablet 650 mg  650 mg Oral Q4H PRN Silviano Garza MD   650 mg at 11/07/17 2115    magnesium hydroxide (MILK OF MAGNESIA) 400 MG/5ML suspension 30 mL  30 mL Oral Daily PRN Silviano Garza MD  ondansetron (ZOFRAN) injection 4 mg  4 mg Intravenous Q6H PRN Tyrone Partida MD   4 mg at 17 0932    enoxaparin (LOVENOX) injection 30 mg  30 mg Subcutaneous Daily Tyrone Partida MD   30 mg at 17 1639    nicotine (NICODERM CQ) 14 MG/24HR 1 patch  1 patch Transdermal Daily Tyrone Partida MD   1 patch at 17 1639       Past Medical History:  Past Medical History:   Diagnosis Date    Arthritis     [facility] kidney disease, stage 5, [facility] failure [facility] [address]     DM (diabetes mellitus) type II controlled with renal manifestation ([facility] [address] 1993    Gastroparesis     GERD (gastroesophageal reflux disease)     Hypertension     Hypothyroid     Restless leg syndrome        Past Surgical History:  Past Surgical History:   Procedure Laterality Date    BREAST SURGERY      infected milk gland removed    CHOLECYSTECTOMY      COLONOSCOPY      ENDOMETRIAL ABLATION      GALLBLADDER SURGERY      MN COLONOSCOPY W/BIOPSY SINGLE/MULTIPLE N/A 10/20/2017    Dr Ritchie Officer prep-Tubular AP (-) dysplasia x 2--3 yr recall    MN EGD TRANSORAL BIOPSY SINGLE/MULTIPLE N/A 2016    Dr Paula Humphrey EGD TRANSORAL BIOPSY SINGLE/MULTIPLE N/A 10/20/2017    Dr Luis Enrique Henderson (-)    TUBAL LIGATION      VASCULAR SURGERY Left 2016    fistula by Dr Alexey White at [address]  10/02/2017    [facility]. Left upper fistulograms/venograms, balloon angioplasty cephalic vein arch [id number] conquest.       Family History  Family History   Problem Relation Age of Onset    Colon Cancer Mother       at 63    Lung Cancer Father       at 66    Stomach Cancer Sister     Breast Cancer Sister     Depression Daughter 25     committed suicide       Social History  Social History     Social History    Marital status:      Spouse name: N/A    Number of children: 2    Years of education: N/A     Occupational History    Not on file.      Social History Main Topics    Smoking status: Current Every Day Smoker     Packs/day: 0.50     Years: 25.00     Types: Cigarettes     Last attempt to quit: 5/12/2017    Smokeless tobacco: Never Used    Alcohol use No    Drug use: No    Sexual activity: Not on file     Other Topics Concern    Not on file     Social History Narrative    No narrative on file         Review of Systems:  History obtained from chart review and the patient  General ROS: No fever or chills  Respiratory ROS: No cough, shortness of breath, wheezing  Cardiovascular ROS: no chest pain or dyspnea on exertion  Gastrointestinal ROS: No abdominal pain or melena  Genito-Urinary ROS: No dysuria or hematuria  Musculoskeletal ROS: No joint pain or swelling         Objective:  Blood pressure (!) 143/77, pulse 89, temperature 98.2 °F (36.8 °C), temperature source Temporal, resp. rate 16, height 5' 6\" (1.676 m), weight 160 lb 6.4 oz (72.8 kg), SpO2 95 %. Intake/Output Summary (Last 24 hours) at 11/10/17 1004  Last data filed at 11/10/17 0828   Gross per 24 hour   Intake             2620 ml   Output              500 ml   Net             2120 ml     General: alert and oriented x3   Chest:  clear to auscultation bilaterally without respiratory distress  CVS: regular rate and rhythm  Abdominal: soft, nontender, normal bowel sounds  Extremities: no cyanosis or edema  Skin: warm and dry without rash    Labs:  BMP: Recent Labs      11/08/17   0404  11/09/17   0125  11/10/17   0412   NA  136  134*  135*   K  4.6  4.0  4.1   CL  90*  89*  93*   CO2  24  28  28   PHOS   --   5.0*   --    BUN  25*  34*  18   CREATININE  5.2*  6.9*  4.6*   CALCIUM  9.0  8.8  8.9     CBC: Recent Labs      11/08/17   0404  11/09/17   0125  11/10/17   0412   WBC  4.4*  4.6*  4.3*   HGB  10.8*  9.9*  10.0*   HCT  33.4*  30.3*  30.3*   MCV  94.6  94.4  92.9   PLT  153  150  158     LIVER PROFILE: No results for input(s): AST, ALT, LIPASE, BILIDIR, BILITOT, ALKPHOS in the last 72 hours. Invalid input(s):   AMYLASE,  ALB  PT/INR: No results

## 2017-11-10 NOTE — PLAN OF CARE
Problem: Risk for Impaired Skin Integrity  Goal: Tissue integrity - skin and mucous membranes  Structural intactness and normal physiological function of skin and  mucous membranes.    Outcome: Ongoing      Problem: Falls - Risk of  Goal: Absence of falls  Outcome: Ongoing      Problem: Nausea/Vomiting:  Goal: Absence of nausea/vomiting  Absence of nausea/vomiting   Outcome: Ongoing    Goal: Able to drink  Able to drink   Outcome: Ongoing    Goal: Able to eat  Able to eat   Outcome: Ongoing    Goal: Ability to achieve adequate nutritional intake will improve  Ability to achieve adequate nutritional intake will improve   Outcome: Ongoing

## 2017-11-22 ENCOUNTER — OFFICE VISIT (OUTPATIENT)
Dept: GASTROENTEROLOGY | Age: 47
End: 2017-11-22
Payer: MEDICARE

## 2017-11-22 VITALS
HEART RATE: 70 BPM | BODY MASS INDEX: 27.48 KG/M2 | OXYGEN SATURATION: 98 % | DIASTOLIC BLOOD PRESSURE: 80 MMHG | HEIGHT: 66 IN | WEIGHT: 171 LBS | SYSTOLIC BLOOD PRESSURE: 136 MMHG

## 2017-11-22 DIAGNOSIS — R10.13 EPIGASTRIC PAIN: ICD-10-CM

## 2017-11-22 DIAGNOSIS — K31.84 GASTROPARESIS: Primary | ICD-10-CM

## 2017-11-22 DIAGNOSIS — R11.2 NAUSEA AND VOMITING, INTRACTABILITY OF VOMITING NOT SPECIFIED, UNSPECIFIED VOMITING TYPE: ICD-10-CM

## 2017-11-22 PROCEDURE — 99213 OFFICE O/P EST LOW 20 MIN: CPT | Performed by: NURSE PRACTITIONER

## 2017-11-22 PROCEDURE — G8482 FLU IMMUNIZE ORDER/ADMIN: HCPCS | Performed by: NURSE PRACTITIONER

## 2017-11-22 PROCEDURE — 1111F DSCHRG MED/CURRENT MED MERGE: CPT | Performed by: NURSE PRACTITIONER

## 2017-11-22 PROCEDURE — G8419 CALC BMI OUT NRM PARAM NOF/U: HCPCS | Performed by: NURSE PRACTITIONER

## 2017-11-22 PROCEDURE — G8427 DOCREV CUR MEDS BY ELIG CLIN: HCPCS | Performed by: NURSE PRACTITIONER

## 2017-11-22 PROCEDURE — 4004F PT TOBACCO SCREEN RCVD TLK: CPT | Performed by: NURSE PRACTITIONER

## 2017-11-22 ASSESSMENT — ENCOUNTER SYMPTOMS
ABDOMINAL DISTENTION: 0
ABDOMINAL PAIN: 1
CONSTIPATION: 0
VOMITING: 1
BLOOD IN STOOL: 0
TROUBLE SWALLOWING: 0
NAUSEA: 1
COUGH: 1
DIARRHEA: 0
VOICE CHANGE: 0
RECTAL PAIN: 0
SORE THROAT: 1
CHOKING: 0
SHORTNESS OF BREATH: 1

## 2017-11-22 NOTE — PROGRESS NOTES
Sally Ferreira is a 49 y.o. female  Primary Care Provider ROMINA Echeverria  Referral Source No ref. provider found    Chief Complaint:  Chief Complaint   Patient presents with    Follow-up     3wk post EGD/CLN           HPI   S/P colon/EGD-Peter on 10/20/17-Colon polyps 3 year recall EGD-Normal-SELENA-negative. Prescribed Bethanechol 25 mg po AC and HS-patient reports that it worsened her abdominal pain and nausea, stopped 2 days after prescribed. Recently admitted to the hospital for 7 days for hypertensive urgency. GES ordered-Questionable gastric outlet vs gastroparesis. Most likely gastroparesis due to recent EGD/Colon per Angles on GI consult note. She was discharged on Reglan 5 mg po AC and HS, which she states has improved her symptoms to only vomiting 4 times since discharge home on 11/10/17. Does have daily nausea. Worsened by Hydralazine, which she takes TID. Associated symptoms include:heartburn and epigastric pain. She has been able to tolerate some foods and small meals. Gain 4 pounds since clinic visit 9/17. Poor appetite continues. No longer on acid suppression/heartburn relief due to renal disease. Denies melena. Symptoms are currently \"managable\" Continues hemodialysis 3 days weekly. Tolerating well, followed closely by nephrology. Chronic constipation continues to be well controlled with Senna and Colace. She is having daily, regular BM's without difficulty. 3 year colon recall-due 2020.     Pertinent Medical history:  Poorly controlled DM Type II  CKD stage 5 with hemodialysis  Hypothyroidism  Hypertension      Previous Diagnostic Tests:     Op Note  Date of Service: 10/20/2017 8:35 AM  [facility]   Gastroenterology      []Hide copied text  []Evangelist for attribution information  Post Procedure Note     Name of surgeon / : [employer]     Date of Service: 10/20/17     Withdrawal Time: >6min     Prep Quality: fair     Pre-operative Diagnosis:        C/Tila Lema 1106 Problems     Diagnosis Date Noted    Family history of colon cancer [Z80.0] 10/20/2017    Unintentional weight loss [R63.4] 10/20/2017    Gastroparesis [K31.84]      Non-intractable vomiting with nausea [R11.2] 11/04/2016    Chronic constipation [K59.09] 11/04/2016         Post-operative Diagnosis/Findings: colon polyps     Procedure: Procedure(s):  COLONOSCOPY WITH  Cold BIOPSY polypectomy and cold snare polypectomy  EGD BIOPSY      Anesthesia: Monitor Anesthesia Care     Surgeons/Assistants: Carolynn Park DO     Referring Physician: Santiago Griffiths DO     Procedure Note:  After informed consent was obtained, the patient was placed in the left lateral decubitus position and sedated per MAC. A rectal exam was done which was normal.  The colonoscope was inserted into the rectum and retroflexed which was normal.  The colonoscope was then advanced to the cecum under direct visualization. A polyp was seen in the ascending colon. It was small in size. It was removed via removed by cold snare and retrieved for pathology. The appendiceal orifice and ileocecal valve were identified. The colonoscope was withdrawn. A polyp was seen in the transverse colon. It was dimunitive in size. It was removed via removed by cold biopsy and sent for pathology. .  No abnormalities were discovered. The colonoscope was completely withdrawn. The patient tolerated the procedure.      The patient was then positioned for an upper endoscopy. The endoscope was advanced down the oropharynx, down the esophagus, through the gastric lumen, into the duodenum. The small bowel appeared normal.  The gastric antrum was normal.  Antral biopsies were obtained for h. Pylori. The gastric body was normal.  Retroflexion was done which showed a normal fundus. The scope was withdrawn. The GE junction was at 38 cm.   It was normal.  The remaining esophagus was normal.  The scope was withdrawn.          Estimated Blood Loss: - 5.0 % Final 11/10/2017  4:12 AM [facility]   Basophils % 1.2   0.0 - 1.0 % Final 11/10/2017  4:12 AM [facility]   Neutrophils # 2.4  1.5 - 7.5 K/uL Final 11/10/2017  4:12 AM [facility]   Lymphocytes # 1.3  1.1 - 4.5 K/uL Final 11/10/2017  4:12 AM [facility]   Monocytes # 0.50  0.00 - 0.90 K/uL Final 11/10/2017  4:12 AM [facility]   Eosinophils # 0.10  0.00 - 0.60 K/uL Final 11/10/2017  4:12 AM [facility]   Basophils # 0.10  0.00 - 0.20 K/uL Final 11/10/2017  4:12 AM [facility]   Testing Performed By     Argentina James Name Director Address Valid Date Range   [facility] [address] LAB Montez Rebolledo  [facility],[address]  [address] [place] [address] 08/30/17 1233-Present   Lab and Collection     CBC Auto Differential on 11/9/2017     11/10/2017  5:44 AM - Cindy Cardona Incoming Lab Results From [facility]     Component Results     Component Value Ref Range & Units Status Collected Lab   Sodium 135   136 - 145 mmol/L Final 11/10/2017  4:12 AM [facility]   Potassium 4.1  3.5 - 5.0 mmol/L Final 11/10/2017  4:12 AM [facility]   Chloride 93   98 - 111 mmol/L Final 11/10/2017  4:12 AM [facility]   CO2 28  22 - 29 mmol/L Final 11/10/2017  4:12 AM [facility]   Anion Gap 14  7 - 19 mmol/L Final 11/10/2017  4:12 AM [facility]   Glucose 204   74 - 109 mg/dL Final 11/10/2017  4:12 AM [facility]   BUN 18  6 - 20 mg/dL Final 11/10/2017  4:12 AM [facility]   CREATININE 4.6   0.5 - 0.9 mg/dL Final 11/10/2017  4:12 AM [facility]   GFR Non-African American 10   >60 Final 11/10/2017  4:12 AM [facility]   This calculation may be inaccurate for patients under the age of 25 years.    For ages 25 and older, a GFR >60 mL/min/1.73m2 (not corrected for weight) is   valid for GERD (gastroesophageal reflux disease)     Hypertension     Hypothyroid     Restless leg syndrome       Past Surgical History:   Procedure Laterality Date    BREAST SURGERY      infected milk gland removed    CHOLECYSTECTOMY      COLONOSCOPY      ENDOMETRIAL ABLATION      GALLBLADDER SURGERY      VT COLONOSCOPY W/BIOPSY SINGLE/MULTIPLE N/A 10/20/2017    Dr David Mcdonough prep-Tubular AP (-) dysplasia x 2--3 yr recall    VT EGD TRANSORAL BIOPSY SINGLE/MULTIPLE N/A 2016    Dr Libra Griffith EGD TRANSORAL BIOPSY SINGLE/MULTIPLE N/A 10/20/2017    Dr Raymundo Florian (-)    TUBAL LIGATION      VASCULAR SURGERY Left 2016    fistula by Dr Lynell Collet at [address]  10/02/2017    [facility]. Left upper fistulograms/venograms, balloon angioplasty cephalic vein arch [id number] conquest.      Family History   Problem Relation Age of Onset    Colon Cancer Mother       at 63    Lung Cancer Father       at 66    Stomach Cancer Sister     Breast Cancer Sister     Depression Daughter 25     committed suicide      Current Outpatient Prescriptions   Medication Sig Dispense Refill    metoclopramide (REGLAN) 5 MG tablet Take 1 tablet by mouth 4 times daily (before meals and nightly) 120 tablet 0    cloNIDine (CATAPRES) 0.1 MG tablet Take 0.1 mg by mouth      Nutritional Supplements (GLUCOSE MANAGEMENT) TABS Take by mouth      senna (SENOKOT) 8.6 MG TABS tablet Take by mouth      levothyroxine (SYNTHROID) 150 MCG tablet Take 150 mcg by mouth Daily      valsartan (DIOVAN) 320 MG tablet Take 320 mg by mouth daily      metoprolol (LOPRESSOR) 100 MG tablet Take 100 mg by mouth 2 times daily      NIFEdipine (ADALAT CC) 60 MG extended release tablet Take 60 mg by mouth daily      isosorbide mononitrate (IMDUR) 120 MG extended release tablet Take 1 tablet by mouth daily 30 tablet 0    hydrALAZINE (APRESOLINE) 100 MG tablet Take 1 tablet by mouth 3 times daily 90 tablet 0    amLODIPine (NORVASC) 5 MG tablet Take 1 tablet by mouth daily 30 tablet 0    insulin detemir (LEVEMIR FLEXTOUCH) 100 UNIT/ML injection pen Inject 5 Units into the skin nightly 5 Pen 3    ondansetron (ZOFRAN) 4 MG tablet Take 4 mg by mouth every 8 hours as needed for Nausea or Vomiting      DULoxetine (CYMBALTA) 60 MG extended release capsule Take 60 mg by mouth daily      rOPINIRole (REQUIP) 2 MG tablet Take 2 mg by mouth nightly      docusate sodium (STOOL SOFTENER) 100 MG capsule Take 300 mg by mouth nightly       simvastatin (ZOCOR) 40 MG tablet Take 40 mg by mouth daily      Cholecalciferol (VITAMIN D3) 63726 UNITS CAPS Take 1 capsule by mouth once a week On Mondays      insulin aspart (NOVOLOG FLEXPEN) 100 UNIT/ML injection pen Inject 5 Units into the skin 3 times daily      bethanechol (URECHOLINE) 25 MG tablet Take 1 tablet by mouth 4 times daily (with meals and nightly) 120 tablet 3     No current facility-administered medications for this visit. Allergies   Allergen Reactions    Lamisil Advanced [Terbinafine Hcl]     Penicillins       Social History   Substance Use Topics    Smoking status: Current Every Day Smoker     Packs/day: 0.50     Years: 25.00     Types: Cigarettes     Last attempt to quit: 5/12/2017    Smokeless tobacco: Never Used    Alcohol use No         Review of Systems   Constitutional: Positive for appetite change. Negative for fever and unexpected weight change. HENT: Positive for sore throat. Negative for trouble swallowing and voice change. Respiratory: Positive for cough and shortness of breath. Negative for choking. Cardiovascular: Negative for chest pain and palpitations. Gastrointestinal: Positive for abdominal pain, nausea and vomiting. Negative for abdominal distention, blood in stool, constipation, diarrhea and rectal pain. Heartburn   Musculoskeletal: Negative for arthralgias and joint swelling. Skin: Negative for pallor, rash and wound.    Neurological: Negative for weakness and light-headedness. Hematological: Negative for adenopathy. Does not bruise/bleed easily. Physical Exam   Constitutional: She is oriented to person, place, and time. She appears well-developed and well-nourished. No distress. /80   Pulse 70   Ht 5' 6\" (1.676 m)   Wt 171 lb (77.6 kg)   SpO2 98%   BMI 27.60 kg/m²    HENT:   Mouth/Throat: Mucous membranes are normal. Abnormal dentition (Poor dentition). Cardiovascular: Normal rate and regular rhythm. No murmur heard. Pulmonary/Chest: Effort normal. No respiratory distress. She has wheezes (Throughout lung fields to inspiration). She has rales (upon expiration throughout). Abdominal: Soft. Normal appearance. She exhibits no distension and no mass. Bowel sounds are decreased. There is no tenderness. There is no rebound and no guarding. Neurological: She is alert and oriented to person, place, and time. Skin: Skin is warm, dry and intact. No pallor. Vitals reviewed. Assessment   1. Gastroparesis  Amb External Referral To Gastroenterology   2. Nausea and vomiting, intractability of vomiting not specified, unspecified vomiting type  Amb External Referral To Gastroenterology   3. Epigastric pain  Amb External Referral To Gastroenterology       Plan  -Refer to [address] for gastroparesis-2nd opinion per pt request-possible gastric stimulator  -Okay to stop Bethanechol due to worsening of symptoms  -Continue Reglan 5 mg po AC and HS  -Reglan contract signed and scanned into chart. Side effects discussed, including tardive dyskinesia, tremors, etc which may not resolve after medication discontinued. She verbalizes understanding.   -FU in 2 months to discuss symptoms  -Call office with question or refills.  -Discuss possible alternative with nephrology for Hydralazine due to nausea, however, do not DC without instruction.  -Encouraged to see PCP if cough and congestion continue

## 2017-11-22 NOTE — PATIENT INSTRUCTIONS
Patient Education        Gastroparesis: Care Instructions  Your Care Instructions    When you have gastroparesis, your stomach takes a lot longer to empty. This delay can cause belly pain, bloating, and belching. It also can cause hiccups, heartburn, nausea or vomiting. You may not feel like eating. These symptoms may come and go. They most often occur during and after meals. You may feel full after only a few bites of food. This condition occurs when the nerves to the stomach don't work properly. Diabetes is the most common cause of this nerve damage. Gastroparesis can make it harder to control your blood sugar levels. But keeping your blood sugar levels under control may help with your symptoms. Parkinson's disease, stroke, and some medicines can also cause this condition. Home treatment can often help. Follow-up care is a key part of your treatment and safety. Be sure to make and go to all appointments, and call your doctor if you are having problems. It's also a good idea to know your test results and keep a list of the medicines you take. How can you care for yourself at home? · Eat several small meals each day rather than three large meals. · Eat foods that are low in fiber and fat. · If your doctor suggests it, take medicines that help the stomach empty more quickly. These are called motility agents. When should you call for help? Call your doctor now or seek immediate medical care if:  · You have new or worse belly pain. · Your belly pain lasts longer than 24 hours and is not getting better. Watch closely for changes in your health, and be sure to contact your doctor if:  · You have digestive problems that get worse or occur more often. · You are losing weight. Where can you learn more? Go to [email or web address].[email or web address]. org and sign in to your [employer] account. Enter M106 in the [employer] box to learn more about \"Gastroparesis: Care Instructions. \"     If you do not have

## 2017-11-26 ENCOUNTER — APPOINTMENT (OUTPATIENT)
Dept: CT IMAGING | Age: 47
End: 2017-11-26
Payer: MEDICARE

## 2017-11-26 ENCOUNTER — HOSPITAL ENCOUNTER (EMERGENCY)
Age: 47
Discharge: HOME | End: 2017-11-26
Payer: MEDICARE

## 2017-11-26 ENCOUNTER — APPOINTMENT (OUTPATIENT)
Dept: GENERAL RADIOLOGY | Age: 47
End: 2017-11-26
Payer: MEDICARE

## 2017-11-26 VITALS
DIASTOLIC BLOOD PRESSURE: 81 MMHG | HEART RATE: 71 BPM | RESPIRATION RATE: 13 BRPM | OXYGEN SATURATION: 93 % | HEIGHT: 66 IN | SYSTOLIC BLOOD PRESSURE: 158 MMHG | WEIGHT: 160.94 LBS | BODY MASS INDEX: 25.86 KG/M2 | TEMPERATURE: 98.8 F

## 2017-11-26 DIAGNOSIS — I16.0 HYPERTENSIVE URGENCY: Primary | ICD-10-CM

## 2017-11-26 LAB
ALBUMIN SERPL-MCNC: 4.1 G/DL (ref 3.5–5.2)
ALP BLD-CCNC: 94 U/L (ref 35–104)
ALT SERPL-CCNC: 10 U/L (ref 5–33)
ANION GAP SERPL CALCULATED.3IONS-SCNC: 16 MMOL/L (ref 7–19)
AST SERPL-CCNC: 16 U/L (ref 5–32)
BACTERIA: NEGATIVE /HPF
BASOPHILS ABSOLUTE: 0 K/UL (ref 0–0.2)
BASOPHILS RELATIVE PERCENT: 0.7 % (ref 0–1)
BILIRUB SERPL-MCNC: 0.4 MG/DL (ref 0.2–1.2)
BILIRUBIN URINE: NEGATIVE
BLOOD, URINE: ABNORMAL
BUN BLDV-MCNC: 28 MG/DL (ref 6–20)
CALCIUM SERPL-MCNC: 9.2 MG/DL (ref 8.6–10)
CHLORIDE BLD-SCNC: 84 MMOL/L (ref 98–111)
CLARITY: ABNORMAL
CO2: 32 MMOL/L (ref 22–29)
COLOR: YELLOW
CREAT SERPL-MCNC: 5.2 MG/DL (ref 0.5–0.9)
EOSINOPHILS ABSOLUTE: 0.1 K/UL (ref 0–0.6)
EOSINOPHILS RELATIVE PERCENT: 1.4 % (ref 0–5)
EPITHELIAL CELLS, UA: ABNORMAL /HPF (ref 0–5)
GFR NON-AFRICAN AMERICAN: 9
GLUCOSE BLD-MCNC: 252 MG/DL (ref 74–109)
GLUCOSE URINE: 500 MG/DL
HCT VFR BLD CALC: 31.3 % (ref 37–47)
HEMOGLOBIN: 10.7 G/DL (ref 12–16)
HYALINE CASTS: 3 /HPF (ref 0–8)
KETONES, URINE: NEGATIVE MG/DL
LEUKOCYTE ESTERASE, URINE: NEGATIVE
LYMPHOCYTES ABSOLUTE: 1.2 K/UL (ref 1.1–4.5)
LYMPHOCYTES RELATIVE PERCENT: 20.1 % (ref 20–40)
MCH RBC QN AUTO: 31.6 PG (ref 27–31)
MCHC RBC AUTO-ENTMCNC: 34.2 G/DL (ref 33–37)
MCV RBC AUTO: 92.3 FL (ref 81–99)
MONOCYTES ABSOLUTE: 0.6 K/UL (ref 0–0.9)
MONOCYTES RELATIVE PERCENT: 9.8 % (ref 0–10)
NEUTROPHILS ABSOLUTE: 3.9 K/UL (ref 1.5–7.5)
NEUTROPHILS RELATIVE PERCENT: 67.8 % (ref 50–65)
NITRITE, URINE: NEGATIVE
PDW BLD-RTO: 14.3 % (ref 11.5–14.5)
PH UA: 7.5
PLATELET # BLD: 165 K/UL (ref 130–400)
PMV BLD AUTO: 9.4 FL (ref 9.4–12.3)
POTASSIUM SERPL-SCNC: 3.4 MMOL/L (ref 3.5–5)
PROTEIN UA: 300 MG/DL
RBC # BLD: 3.39 M/UL (ref 4.2–5.4)
RBC UA: 13 /HPF (ref 0–4)
SODIUM BLD-SCNC: 132 MMOL/L (ref 136–145)
SPECIFIC GRAVITY UA: 1.01
TOTAL PROTEIN: 7.3 G/DL (ref 6.6–8.7)
UROBILINOGEN, URINE: 0.2 E.U./DL
WBC # BLD: 5.7 K/UL (ref 4.8–10.8)
WBC UA: 15 /HPF (ref 0–5)

## 2017-11-26 PROCEDURE — 99283 EMERGENCY DEPT VISIT LOW MDM: CPT | Performed by: PHYSICIAN ASSISTANT

## 2017-11-26 PROCEDURE — 36415 COLL VENOUS BLD VENIPUNCTURE: CPT

## 2017-11-26 PROCEDURE — 81001 URINALYSIS AUTO W/SCOPE: CPT

## 2017-11-26 PROCEDURE — 93005 ELECTROCARDIOGRAM TRACING: CPT

## 2017-11-26 PROCEDURE — 6360000002 HC RX W HCPCS: Performed by: PHYSICIAN ASSISTANT

## 2017-11-26 PROCEDURE — 85025 COMPLETE CBC W/AUTO DIFF WBC: CPT

## 2017-11-26 PROCEDURE — 99284 EMERGENCY DEPT VISIT MOD MDM: CPT

## 2017-11-26 PROCEDURE — 70450 CT HEAD/BRAIN W/O DYE: CPT

## 2017-11-26 PROCEDURE — 80053 COMPREHEN METABOLIC PANEL: CPT

## 2017-11-26 PROCEDURE — 96374 THER/PROPH/DIAG INJ IV PUSH: CPT

## 2017-11-26 PROCEDURE — 71010 XR CHEST PORTABLE: CPT

## 2017-11-26 PROCEDURE — 6370000000 HC RX 637 (ALT 250 FOR IP): Performed by: PHYSICIAN ASSISTANT

## 2017-11-26 PROCEDURE — 96375 TX/PRO/DX INJ NEW DRUG ADDON: CPT

## 2017-11-26 RX ORDER — LABETALOL HYDROCHLORIDE 5 MG/ML
10 INJECTION, SOLUTION INTRAVENOUS ONCE
Status: DISCONTINUED | OUTPATIENT
Start: 2017-11-26 | End: 2017-11-26

## 2017-11-26 RX ORDER — PROMETHAZINE HYDROCHLORIDE 25 MG/ML
25 INJECTION, SOLUTION INTRAMUSCULAR; INTRAVENOUS ONCE
Status: COMPLETED | OUTPATIENT
Start: 2017-11-26 | End: 2017-11-26

## 2017-11-26 RX ORDER — CLONIDINE HYDROCHLORIDE 0.1 MG/1
0.1 TABLET ORAL ONCE
Status: COMPLETED | OUTPATIENT
Start: 2017-11-26 | End: 2017-11-26

## 2017-11-26 RX ORDER — MORPHINE SULFATE 4 MG/ML
4 INJECTION, SOLUTION INTRAMUSCULAR; INTRAVENOUS ONCE
Status: COMPLETED | OUTPATIENT
Start: 2017-11-26 | End: 2017-11-26

## 2017-11-26 RX ORDER — HYDRALAZINE HYDROCHLORIDE 20 MG/ML
20 INJECTION INTRAMUSCULAR; INTRAVENOUS ONCE
Status: COMPLETED | OUTPATIENT
Start: 2017-11-26 | End: 2017-11-26

## 2017-11-26 RX ADMIN — HYDRALAZINE HYDROCHLORIDE 20 MG: 20 INJECTION INTRAMUSCULAR; INTRAVENOUS at 12:29

## 2017-11-26 RX ADMIN — CLONIDINE HYDROCHLORIDE 0.1 MG: 0.1 TABLET ORAL at 15:04

## 2017-11-26 RX ADMIN — MORPHINE SULFATE 4 MG: 4 INJECTION, SOLUTION INTRAMUSCULAR; INTRAVENOUS at 13:46

## 2017-11-26 RX ADMIN — PROMETHAZINE HYDROCHLORIDE 25 MG: 25 INJECTION INTRAMUSCULAR; INTRAVENOUS at 12:29

## 2017-11-26 ASSESSMENT — PAIN SCALES - GENERAL: PAINLEVEL_OUTOF10: 9

## 2017-11-26 ASSESSMENT — PAIN DESCRIPTION - LOCATION: LOCATION: CHEST

## 2017-11-26 ASSESSMENT — PAIN DESCRIPTION - DESCRIPTORS: DESCRIPTORS: TIGHTNESS

## 2017-11-26 NOTE — ED PROVIDER NOTES
eMERGENCY dEPARTMENT eNCOUnter      Pt Name: Matt Chen  MRN: [id number]  [place] 1970  Date of evaluation: 11/26/2017  Provider: Sina Ventura Dr       Chief Complaint   Patient presents with    Emesis    Hypertension         HISTORY OF PRESENT ILLNESS  (Location/Symptom, Timing/Onset, Context/Setting, Quality, Duration, Modifying Factors, Severity.)   Matt Chen is a 49 y.o. female who presents to the emergency department With nausea vomiting and hypertensive crisis. Patient was seen and evaluated in our ED earlier this month for similar symptoms. She is a dialysis patient and suffers from hypertension. States that she dialyzed as scheduled on Saturday, they removed 5.3 L off. This is normal for her. States that last night she began experiencing a headache. States that she noted her blood pressure was very high. She took all her blood pressure medications as directed this morning however continues to be high and her headache has increased in severity. She is nauseated and has been vomiting. Nursing Notes were reviewed and I agree. REVIEW OF SYSTEMS    (2-9 systems for level 4, 10 or more for level 5)     Review of Systems   Cardiovascular:        Hypertension   Neurological: Positive for headaches. Except as noted above the remainder of the review of systems was reviewed and negative.        PAST MEDICAL HISTORY     Past Medical History:   Diagnosis Date    Arthritis     Chronic kidney disease, stage 5, kidney failure ([address]     DM (diabetes mellitus) type II controlled with renal manifestation ([address] 06/1993    Gastroparesis     GERD (gastroesophageal reflux disease)     Hypertension     Hypothyroid     Restless leg syndrome          SURGICAL HISTORY       Past Surgical History:   Procedure Laterality Date    BREAST SURGERY      infected milk gland removed    CHOLECYSTECTOMY      COLONOSCOPY      ENDOMETRIAL ABLATION  2012    GALLBLADDER SURGERY      AL COLONOSCOPY W/BIOPSY SINGLE/MULTIPLE N/A 10/20/2017    Dr Yessi Bernal prep-Tubular AP (-) dysplasia x 2--3 yr recall    AL EGD TRANSORAL BIOPSY SINGLE/MULTIPLE N/A 12/27/2016    Dr Brielle Barksdale EGD TRANSORAL BIOPSY SINGLE/MULTIPLE N/A 10/20/2017    Dr Prabhakar Ochoa (-)    TUBAL LIGATION      VASCULAR SURGERY Left 2016    fistula by Dr Charmayne Flemings at [address]  10/02/2017    [facility]. Left upper fistulograms/venograms, balloon angioplasty cephalic vein arch [id number] conquest.         CURRENT MEDICATIONS       Discharge Medication List as of 11/26/2017  2:36 PM      CONTINUE these medications which have NOT CHANGED    Details   metoclopramide (REGLAN) 5 MG tablet Take 1 tablet by mouth 4 times daily (before meals and nightly), Disp-120 tablet, R-0Print      bethanechol (URECHOLINE) 25 MG tablet Take 1 tablet by mouth 4 times daily (with meals and nightly), Disp-120 tablet, R-3Normal      cloNIDine (CATAPRES) 0.1 MG tablet Take 0.1 mg by mouthHistorical Med      Nutritional Supplements (GLUCOSE MANAGEMENT) TABS Take by mouthHistorical Med      senna (SENOKOT) 8.6 MG TABS tablet Take by mouthHistorical Med      levothyroxine (SYNTHROID) 150 MCG tablet Take 150 mcg by mouth DailyHistorical Med      valsartan (DIOVAN) 320 MG tablet Take 320 mg by mouth dailyHistorical Med      metoprolol (LOPRESSOR) 100 MG tablet Take 100 mg by mouth 2 times dailyHistorical Med      NIFEdipine (ADALAT CC) 60 MG extended release tablet Take 60 mg by mouth dailyHistorical Med      isosorbide mononitrate (IMDUR) 120 MG extended release tablet Take 1 tablet by mouth daily, Disp-30 tablet, R-0Normal      hydrALAZINE (APRESOLINE) 100 MG tablet Take 1 tablet by mouth 3 times daily, Disp-90 tablet, R-0Normal      amLODIPine (NORVASC) 5 MG tablet Take 1 tablet by mouth daily, Disp-30 tablet, R-0Normal      insulin detemir (LEVEMIR FLEXTOUCH) 100 UNIT/ML injection pen Inject 5 Units into the skin nightly, Disp-5 Pen, R-3Normal      ondansetron (ZOFRAN) 4 MG tablet Take 4 mg by mouth every 8 hours as needed for Nausea or Vomiting      DULoxetine (CYMBALTA) 60 MG extended release capsule Take 60 mg by mouth daily      rOPINIRole (REQUIP) 2 MG tablet Take 2 mg by mouth nightly      docusate sodium (STOOL SOFTENER) 100 MG capsule Take 300 mg by mouth nightly Historical Med      simvastatin (ZOCOR) 40 MG tablet Take 40 mg by mouth daily      Cholecalciferol (VITAMIN D3) 53841 UNITS CAPS Take 1 capsule by mouth once a week On Historical Med      insulin aspart (NOVOLOG FLEXPEN) 100 UNIT/ML injection pen Inject 5 Units into the skin 3 times daily             ALLERGIES     Lamisil advanced [terbinafine hcl] and Penicillins    FAMILY HISTORY       Family History   Problem Relation Age of Onset    Colon Cancer Mother       at 61    Lung Cancer Father       at 66    Stomach Cancer Sister     Breast Cancer Sister     Depression Daughter 25     committed suicide          SOCIAL HISTORY       Social History     Social History    Marital status:      Spouse name: N/A    Number of children: 2    Years of education: N/A     Social History Main Topics    Smoking status: Current Every Day Smoker     Packs/day: 0.50     Years: 25.00     Types: Cigarettes     Last attempt to quit: 2017    Smokeless tobacco: Never Used    Alcohol use No    Drug use: No    Sexual activity: Not Asked     Other Topics Concern    None     Social History Narrative    None       SCREENINGS           PHYSICAL EXAM    (up to 7 for level 4, 8 or more for level 5)     ED Triage Vitals [17 1147]   BP Temp Temp Source Pulse Resp SpO2 Height Weight   (!) 221/78 99.5 °F (37.5 °C) Oral 66 18 96 % 5' 6\" (1.676 m) 160 lb 15 oz (73 kg)       Physical Exam   Constitutional: She is oriented to person, place, and time. She appears well-developed and well-nourished. No distress.    Appears fatigued   HENT:   Head: Normocephalic and following:     Sodium 132 (*)     Potassium 3.4 (*)     Chloride 84 (*)     CO2 32 (*)     Glucose 252 (*)     BUN 28 (*)     CREATININE 5.2 (*)     GFR Non- 9 (*)     All other components within normal limits   URINALYSIS - Abnormal; Notable for the following:     Clarity, UA CLOUDY (*)     Glucose, Ur 500 (*)     Blood, Urine TRACE (*)     All other components within normal limits   MICROSCOPIC URINALYSIS - Abnormal; Notable for the following:     WBC, UA 15 (*)     RBC, UA 13 (*)     Epi Cells TNTC (*)     All other components within normal limits       All other labs were within normal range or not returned as of this dictation. EMERGENCY DEPARTMENT COURSE and DIFFERENTIAL DIAGNOSIS/MDM:   Vitals:    Vitals:    11/26/17 1432 11/26/17 1448 11/26/17 1503 11/26/17 1526   BP: (!) 183/83 (!) 188/83 (!) 161/78 (!) 158/81   Pulse: 72 73 73 71   Resp:   14 13   Temp:    98.8 °F (37.1 °C)   TempSrc:       SpO2: 94% 96% 92% 93%   Weight:       Height:               MDM  Number of Diagnoses or Management Options  Hypertensive urgency:   Diagnosis management comments: Blood pressure is currently 158/81. Headache has resolved. CT of the head was unremarkable. No neurological deficits on examination. I stressed the importance of the patient following up with her primary care doctor tomorrow to discuss her hypertension medication regimen. She is understanding and compliant with care plan. Stable ready for discharge. PROCEDURES:    Procedures      FINAL IMPRESSION      1. Hypertensive urgency          DISPOSITION/PLAN   DISPOSITION Decision to Discharge    Patient was told that if symptoms worsen or new symptoms develop they are to return to the emergency department immediately. Patient was educated on diagnosis and treatment plan. All of patient's questions were answered, and the patient understands the discharge plan. I do not feel the patient has a life-threatening condition at this time. Patient is to be discharged.        PATIENT REFERRED TO:  ROMINA Mendoza  [address]  [phone]    Schedule an appointment as soon as possible for a visit in 1 day  Follow up tomorrow for hypertension control      DISCHARGE MEDICATIONS:  Discharge Medication List as of 11/26/2017  2:36 PM          (Please note that portions of this note were completed with a voice recognition program.  Efforts were made to edit the dictations but occasionally words are mis-transcribed.)    Randal Khalil, [address] [place] [address]  11/26/17 1532

## 2017-11-26 NOTE — ED TRIAGE NOTES
Pt had Dialysis as scheduled on Saturday, per pt yesterday she began to have hypertension and emesis. She also states she has not peed today.

## 2017-11-28 ENCOUNTER — TELEPHONE (OUTPATIENT)
Dept: GASTROENTEROLOGY | Age: 47
End: 2017-11-28

## 2017-11-28 LAB
EKG P AXIS: 72 DEGREES
EKG P-R INTERVAL: 170 MS
EKG Q-T INTERVAL: 456 MS
EKG QRS DURATION: 88 MS
EKG QTC CALCULATION (BAZETT): 459 MS
EKG T AXIS: 83 DEGREES

## 2017-11-28 NOTE — TELEPHONE ENCOUNTER
----- Message from [facility] sent at 11/22/2017  4:34 PM CST -----  KJ CHECK OUT NOTE  Refer to [address] for gastroparesis

## 2017-11-29 NOTE — TELEPHONE ENCOUNTER
I have called and scheduled the patient with Dr. Alesia Oppenheim at [address] for 2/28/18 at 10:20. The patient has been notified of appointment date and time.    All information has been faxed to [place] at [phone]

## 2017-12-08 ENCOUNTER — HOSPITAL ENCOUNTER (INPATIENT)
Age: 47
LOS: 3 days | Discharge: HOME | DRG: 682 | End: 2017-12-12
Attending: EMERGENCY MEDICINE | Admitting: INTERNAL MEDICINE
Payer: MEDICARE

## 2017-12-08 ENCOUNTER — APPOINTMENT (OUTPATIENT)
Dept: CT IMAGING | Age: 47
DRG: 682 | End: 2017-12-08
Payer: MEDICARE

## 2017-12-08 DIAGNOSIS — I16.0 HYPERTENSIVE URGENCY, MALIGNANT: Primary | ICD-10-CM

## 2017-12-08 DIAGNOSIS — G93.40 ACUTE ENCEPHALOPATHY: ICD-10-CM

## 2017-12-08 DIAGNOSIS — N18.5 CHRONIC RENAL FAILURE, STAGE 5 (HCC): ICD-10-CM

## 2017-12-08 DIAGNOSIS — E11.10 DIABETIC KETOACIDOSIS WITHOUT COMA ASSOCIATED WITH TYPE 2 DIABETES MELLITUS (HCC): ICD-10-CM

## 2017-12-08 DIAGNOSIS — Z91.15 NONCOMPLIANCE OF PATIENT WITH RENAL DIALYSIS (HCC): ICD-10-CM

## 2017-12-08 LAB
ACETONE BLOOD: ABNORMAL
ALBUMIN SERPL-MCNC: 3.7 G/DL (ref 3.5–5.2)
ALP BLD-CCNC: 108 U/L (ref 35–104)
ALT SERPL-CCNC: 10 U/L (ref 5–33)
ANION GAP SERPL CALCULATED.3IONS-SCNC: 27 MMOL/L (ref 7–19)
AST SERPL-CCNC: 12 U/L (ref 5–32)
BASE EXCESS ARTERIAL: -7.7 MMOL/L (ref -2–2)
BASOPHILS ABSOLUTE: 0.1 K/UL (ref 0–0.2)
BASOPHILS RELATIVE PERCENT: 1.2 % (ref 0–1)
BILIRUB SERPL-MCNC: 0.3 MG/DL (ref 0.2–1.2)
BUN BLDV-MCNC: 63 MG/DL (ref 6–20)
CALCIUM SERPL-MCNC: 8.7 MG/DL (ref 8.6–10)
CARBOXYHEMOGLOBIN ARTERIAL: 2.1 % (ref 0–5)
CHLORIDE BLD-SCNC: 77 MMOL/L (ref 98–111)
CO2: 17 MMOL/L (ref 22–29)
CREAT SERPL-MCNC: 8.3 MG/DL (ref 0.5–0.9)
EOSINOPHILS ABSOLUTE: 0.1 K/UL (ref 0–0.6)
EOSINOPHILS RELATIVE PERCENT: 1.1 % (ref 0–5)
ETHANOL: <10 MG/DL (ref 0–0.08)
GFR NON-AFRICAN AMERICAN: 5
GLUCOSE BLD-MCNC: 834 MG/DL (ref 74–109)
HCO3 ARTERIAL: 18.8 MMOL/L (ref 22–26)
HCT VFR BLD CALC: 32.7 % (ref 37–47)
HEMOGLOBIN, ART, EXTENDED: 11.3 G/DL (ref 12–16)
HEMOGLOBIN: 11.1 G/DL (ref 12–16)
LYMPHOCYTES ABSOLUTE: 2.2 K/UL (ref 1.1–4.5)
LYMPHOCYTES RELATIVE PERCENT: 29.1 % (ref 20–40)
MAGNESIUM: 2.5 MG/DL (ref 1.6–2.6)
MCH RBC QN AUTO: 31.4 PG (ref 27–31)
MCHC RBC AUTO-ENTMCNC: 33.9 G/DL (ref 33–37)
MCV RBC AUTO: 92.4 FL (ref 81–99)
METHEMOGLOBIN ARTERIAL: 0.7 %
MONOCYTES ABSOLUTE: 0.5 K/UL (ref 0–0.9)
MONOCYTES RELATIVE PERCENT: 7 % (ref 0–10)
NEUTROPHILS ABSOLUTE: 4.6 K/UL (ref 1.5–7.5)
NEUTROPHILS RELATIVE PERCENT: 61.1 % (ref 50–65)
O2 CONTENT ARTERIAL: 14.4 ML/DL
O2 SAT, ARTERIAL: 90.6 %
O2 THERAPY: ABNORMAL
PCO2 ARTERIAL: 41 MMHG (ref 35–45)
PDW BLD-RTO: 13.7 % (ref 11.5–14.5)
PH ARTERIAL: 7.27 (ref 7.35–7.45)
PLATELET # BLD: 256 K/UL (ref 130–400)
PMV BLD AUTO: 9.3 FL (ref 9.4–12.3)
PO2 ARTERIAL: 64 MMHG (ref 80–100)
POTASSIUM SERPL-SCNC: 4.4 MMOL/L (ref 3.5–5)
POTASSIUM, WHOLE BLOOD: 4.1
RBC # BLD: 3.54 M/UL (ref 4.2–5.4)
SODIUM BLD-SCNC: 121 MMOL/L (ref 136–145)
TOTAL PROTEIN: 7 G/DL (ref 6.6–8.7)
WBC # BLD: 7.6 K/UL (ref 4.8–10.8)

## 2017-12-08 PROCEDURE — 2580000003 HC RX 258: Performed by: EMERGENCY MEDICINE

## 2017-12-08 PROCEDURE — 36600 WITHDRAWAL OF ARTERIAL BLOOD: CPT

## 2017-12-08 PROCEDURE — 6370000000 HC RX 637 (ALT 250 FOR IP): Performed by: EMERGENCY MEDICINE

## 2017-12-08 PROCEDURE — 36415 COLL VENOUS BLD VENIPUNCTURE: CPT

## 2017-12-08 PROCEDURE — 2500000003 HC RX 250 WO HCPCS: Performed by: EMERGENCY MEDICINE

## 2017-12-08 PROCEDURE — 80053 COMPREHEN METABOLIC PANEL: CPT

## 2017-12-08 PROCEDURE — 83735 ASSAY OF MAGNESIUM: CPT

## 2017-12-08 PROCEDURE — 85025 COMPLETE CBC W/AUTO DIFF WBC: CPT

## 2017-12-08 PROCEDURE — 93005 ELECTROCARDIOGRAM TRACING: CPT

## 2017-12-08 PROCEDURE — 84132 ASSAY OF SERUM POTASSIUM: CPT

## 2017-12-08 PROCEDURE — 70450 CT HEAD/BRAIN W/O DYE: CPT

## 2017-12-08 PROCEDURE — 82009 KETONE BODYS QUAL: CPT

## 2017-12-08 PROCEDURE — 82803 BLOOD GASES ANY COMBINATION: CPT

## 2017-12-08 PROCEDURE — G0480 DRUG TEST DEF 1-7 CLASSES: HCPCS

## 2017-12-08 PROCEDURE — 99291 CRITICAL CARE FIRST HOUR: CPT

## 2017-12-08 RX ORDER — 0.9 % SODIUM CHLORIDE 0.9 %
1000 INTRAVENOUS SOLUTION INTRAVENOUS ONCE
Status: COMPLETED | OUTPATIENT
Start: 2017-12-08 | End: 2017-12-09

## 2017-12-08 RX ADMIN — SODIUM CHLORIDE 1000 ML: 9 INJECTION, SOLUTION INTRAVENOUS at 23:03

## 2017-12-08 RX ADMIN — SODIUM CHLORIDE 8.85 UNITS/HR: 9 INJECTION, SOLUTION INTRAVENOUS at 23:34

## 2017-12-08 NOTE — Clinical Note
Patient Class: Inpatient [101]   REQUIRED: Diagnosis: DKA, type 2, not at goal [facility]) [[id number]]   Estimated Length of Stay: Estimated stay of more than 2 midnights   Future Attending Provider: Joni Kimbrough [[id number]]   Admitting Provider: Mimi Baker [[id number]]   Telemetry Bed Required?: Yes

## 2017-12-09 PROBLEM — E13.00 HYPEROSMOLARITY DUE TO SECONDARY DIABETES (HCC): Status: ACTIVE | Noted: 2017-12-09

## 2017-12-09 PROBLEM — E11.10 DKA, TYPE 2, NOT AT GOAL (HCC): Status: ACTIVE | Noted: 2017-12-09

## 2017-12-09 LAB
AMPHETAMINE SCREEN, URINE: NEGATIVE
ANION GAP SERPL CALCULATED.3IONS-SCNC: 18 MMOL/L (ref 7–19)
ANION GAP SERPL CALCULATED.3IONS-SCNC: 19 MMOL/L (ref 7–19)
ANION GAP SERPL CALCULATED.3IONS-SCNC: 24 MMOL/L (ref 7–19)
BACTERIA: NEGATIVE /HPF
BARBITURATE SCREEN URINE: NEGATIVE
BENZODIAZEPINE SCREEN, URINE: NEGATIVE
BILIRUBIN URINE: NEGATIVE
BLOOD, URINE: ABNORMAL
BUN BLDV-MCNC: 25 MG/DL (ref 6–20)
BUN BLDV-MCNC: 64 MG/DL (ref 6–20)
BUN BLDV-MCNC: 67 MG/DL (ref 6–20)
CALCIUM SERPL-MCNC: 8.4 MG/DL (ref 8.6–10)
CALCIUM SERPL-MCNC: 8.6 MG/DL (ref 8.6–10)
CALCIUM SERPL-MCNC: 9.2 MG/DL (ref 8.6–10)
CANNABINOID SCREEN URINE: NEGATIVE
CHLORIDE BLD-SCNC: 81 MMOL/L (ref 98–111)
CHLORIDE BLD-SCNC: 87 MMOL/L (ref 98–111)
CHLORIDE BLD-SCNC: 91 MMOL/L (ref 98–111)
CLARITY: CLEAR
CO2: 20 MMOL/L (ref 22–29)
CO2: 24 MMOL/L (ref 22–29)
CO2: 24 MMOL/L (ref 22–29)
COCAINE METABOLITE SCREEN URINE: NEGATIVE
COLOR: YELLOW
CREAT SERPL-MCNC: 4.9 MG/DL (ref 0.5–0.9)
CREAT SERPL-MCNC: 7.9 MG/DL (ref 0.5–0.9)
CREAT SERPL-MCNC: 8.3 MG/DL (ref 0.5–0.9)
EPITHELIAL CELLS, UA: 1 /HPF (ref 0–5)
GFR NON-AFRICAN AMERICAN: 5
GFR NON-AFRICAN AMERICAN: 5
GFR NON-AFRICAN AMERICAN: 9
GLUCOSE BLD-MCNC: 120 MG/DL (ref 70–99)
GLUCOSE BLD-MCNC: 125 MG/DL (ref 70–99)
GLUCOSE BLD-MCNC: 188 MG/DL (ref 70–99)
GLUCOSE BLD-MCNC: 233 MG/DL (ref 74–109)
GLUCOSE BLD-MCNC: 283 MG/DL (ref 70–99)
GLUCOSE BLD-MCNC: 287 MG/DL (ref 70–99)
GLUCOSE BLD-MCNC: 387 MG/DL (ref 70–99)
GLUCOSE BLD-MCNC: 437 MG/DL (ref 70–99)
GLUCOSE BLD-MCNC: 480 MG/DL (ref 70–99)
GLUCOSE BLD-MCNC: 547 MG/DL (ref 74–109)
GLUCOSE BLD-MCNC: 710 MG/DL (ref 74–109)
GLUCOSE BLD-MCNC: 76 MG/DL (ref 74–109)
GLUCOSE BLD-MCNC: 84 MG/DL (ref 70–99)
GLUCOSE BLD-MCNC: >550 MG/DL (ref 70–99)
GLUCOSE URINE: >=1000 MG/DL
HBA1C MFR BLD: 9.9 %
HYALINE CASTS: 1 /HPF (ref 0–8)
KETONES, URINE: ABNORMAL MG/DL
LACTIC ACID: 2.3 MMOL/L (ref 0.5–1.9)
LACTIC ACID: 3.8 MMOL/L (ref 0.5–1.9)
LEUKOCYTE ESTERASE, URINE: NEGATIVE
Lab: NORMAL
NITRITE, URINE: NEGATIVE
OPIATE SCREEN URINE: NEGATIVE
OSMOLALITY: 317 MOSM/KG (ref 275–300)
PERFORMED ON: ABNORMAL
PERFORMED ON: NORMAL
PH UA: 6.5
POTASSIUM SERPL-SCNC: 3.5 MMOL/L (ref 3.5–5)
POTASSIUM SERPL-SCNC: 3.7 MMOL/L (ref 3.5–5)
POTASSIUM SERPL-SCNC: 4.1 MMOL/L (ref 3.5–5)
PROTEIN UA: 300 MG/DL
RBC UA: 9 /HPF (ref 0–4)
SODIUM BLD-SCNC: 125 MMOL/L (ref 136–145)
SODIUM BLD-SCNC: 129 MMOL/L (ref 136–145)
SODIUM BLD-SCNC: 134 MMOL/L (ref 136–145)
SPECIFIC GRAVITY UA: 1.02
TROPONIN: 0.02 NG/ML (ref 0–0.03)
TROPONIN: 0.02 NG/ML (ref 0–0.03)
UROBILINOGEN, URINE: 0.2 E.U./DL
WBC UA: 3 /HPF (ref 0–5)

## 2017-12-09 PROCEDURE — 83930 ASSAY OF BLOOD OSMOLALITY: CPT

## 2017-12-09 PROCEDURE — 87086 URINE CULTURE/COLONY COUNT: CPT

## 2017-12-09 PROCEDURE — 87040 BLOOD CULTURE FOR BACTERIA: CPT

## 2017-12-09 PROCEDURE — 80074 ACUTE HEPATITIS PANEL: CPT

## 2017-12-09 PROCEDURE — 80307 DRUG TEST PRSMV CHEM ANLYZR: CPT

## 2017-12-09 PROCEDURE — 2580000003 HC RX 258: Performed by: INTERNAL MEDICINE

## 2017-12-09 PROCEDURE — 99291 CRITICAL CARE FIRST HOUR: CPT | Performed by: INTERNAL MEDICINE

## 2017-12-09 PROCEDURE — 84484 ASSAY OF TROPONIN QUANT: CPT

## 2017-12-09 PROCEDURE — 6370000000 HC RX 637 (ALT 250 FOR IP): Performed by: INTERNAL MEDICINE

## 2017-12-09 PROCEDURE — 80048 BASIC METABOLIC PNL TOTAL CA: CPT

## 2017-12-09 PROCEDURE — 83605 ASSAY OF LACTIC ACID: CPT

## 2017-12-09 PROCEDURE — 2500000003 HC RX 250 WO HCPCS: Performed by: EMERGENCY MEDICINE

## 2017-12-09 PROCEDURE — 8010000000 HC HEMODIALYSIS ACUTE INPT

## 2017-12-09 PROCEDURE — 6370000000 HC RX 637 (ALT 250 FOR IP): Performed by: FAMILY MEDICINE

## 2017-12-09 PROCEDURE — 82948 REAGENT STRIP/BLOOD GLUCOSE: CPT

## 2017-12-09 PROCEDURE — 87070 CULTURE OTHR SPECIMN AEROBIC: CPT

## 2017-12-09 PROCEDURE — 82947 ASSAY GLUCOSE BLOOD QUANT: CPT

## 2017-12-09 PROCEDURE — 83036 HEMOGLOBIN GLYCOSYLATED A1C: CPT

## 2017-12-09 PROCEDURE — 6360000002 HC RX W HCPCS: Performed by: INTERNAL MEDICINE

## 2017-12-09 PROCEDURE — 81001 URINALYSIS AUTO W/SCOPE: CPT

## 2017-12-09 PROCEDURE — 5A1D70Z PERFORMANCE OF URINARY FILTRATION, INTERMITTENT, LESS THAN 6 HOURS PER DAY: ICD-10-PCS | Performed by: INTERNAL MEDICINE

## 2017-12-09 PROCEDURE — 36415 COLL VENOUS BLD VENIPUNCTURE: CPT

## 2017-12-09 PROCEDURE — 2700000000 HC OXYGEN THERAPY PER DAY

## 2017-12-09 PROCEDURE — 2580000003 HC RX 258: Performed by: EMERGENCY MEDICINE

## 2017-12-09 PROCEDURE — 2000000000 HC ICU R&B

## 2017-12-09 PROCEDURE — 99291 CRITICAL CARE FIRST HOUR: CPT | Performed by: EMERGENCY MEDICINE

## 2017-12-09 PROCEDURE — 86706 HEP B SURFACE ANTIBODY: CPT

## 2017-12-09 RX ORDER — AMLODIPINE BESYLATE 5 MG/1
5 TABLET ORAL DAILY
Status: DISCONTINUED | OUTPATIENT
Start: 2017-12-09 | End: 2017-12-12 | Stop reason: HOSPADM

## 2017-12-09 RX ORDER — BISACODYL 10 MG
10 SUPPOSITORY, RECTAL RECTAL DAILY PRN
Status: DISCONTINUED | OUTPATIENT
Start: 2017-12-09 | End: 2017-12-12 | Stop reason: HOSPADM

## 2017-12-09 RX ORDER — ROPINIROLE 1 MG/1
2 TABLET, FILM COATED ORAL NIGHTLY
Status: DISCONTINUED | OUTPATIENT
Start: 2017-12-09 | End: 2017-12-12 | Stop reason: HOSPADM

## 2017-12-09 RX ORDER — METOCLOPRAMIDE 10 MG/1
5 TABLET ORAL
Status: DISCONTINUED | OUTPATIENT
Start: 2017-12-09 | End: 2017-12-12 | Stop reason: HOSPADM

## 2017-12-09 RX ORDER — SODIUM CHLORIDE 450 MG/100ML
INJECTION, SOLUTION INTRAVENOUS CONTINUOUS
Status: DISCONTINUED | OUTPATIENT
Start: 2017-12-09 | End: 2017-12-09

## 2017-12-09 RX ORDER — DEXTROSE MONOHYDRATE 50 MG/ML
100 INJECTION, SOLUTION INTRAVENOUS PRN
Status: DISCONTINUED | OUTPATIENT
Start: 2017-12-09 | End: 2017-12-12 | Stop reason: HOSPADM

## 2017-12-09 RX ORDER — METOPROLOL TARTRATE 50 MG/1
100 TABLET, FILM COATED ORAL 2 TIMES DAILY
Status: DISCONTINUED | OUTPATIENT
Start: 2017-12-09 | End: 2017-12-12 | Stop reason: HOSPADM

## 2017-12-09 RX ORDER — ACETAMINOPHEN 325 MG/1
650 TABLET ORAL EVERY 4 HOURS PRN
Status: DISCONTINUED | OUTPATIENT
Start: 2017-12-09 | End: 2017-12-12 | Stop reason: HOSPADM

## 2017-12-09 RX ORDER — HEPARIN SODIUM 5000 [USP'U]/ML
5000 INJECTION, SOLUTION INTRAVENOUS; SUBCUTANEOUS EVERY 8 HOURS SCHEDULED
Status: DISCONTINUED | OUTPATIENT
Start: 2017-12-09 | End: 2017-12-12 | Stop reason: HOSPADM

## 2017-12-09 RX ORDER — NICOTINE POLACRILEX 4 MG
15 LOZENGE BUCCAL PRN
Status: DISCONTINUED | OUTPATIENT
Start: 2017-12-09 | End: 2017-12-09 | Stop reason: SDUPTHER

## 2017-12-09 RX ORDER — INSULIN GLARGINE 100 [IU]/ML
5 INJECTION, SOLUTION SUBCUTANEOUS NIGHTLY
Status: DISCONTINUED | OUTPATIENT
Start: 2017-12-09 | End: 2017-12-10

## 2017-12-09 RX ORDER — DEXTROSE MONOHYDRATE 25 G/50ML
12.5 INJECTION, SOLUTION INTRAVENOUS PRN
Status: DISCONTINUED | OUTPATIENT
Start: 2017-12-09 | End: 2017-12-09 | Stop reason: SDUPTHER

## 2017-12-09 RX ORDER — ONDANSETRON 4 MG/1
4 TABLET, FILM COATED ORAL EVERY 8 HOURS PRN
Status: DISCONTINUED | OUTPATIENT
Start: 2017-12-09 | End: 2017-12-12 | Stop reason: HOSPADM

## 2017-12-09 RX ORDER — DULOXETIN HYDROCHLORIDE 60 MG/1
60 CAPSULE, DELAYED RELEASE ORAL DAILY
Status: DISCONTINUED | OUTPATIENT
Start: 2017-12-09 | End: 2017-12-12 | Stop reason: HOSPADM

## 2017-12-09 RX ORDER — ISOSORBIDE MONONITRATE 60 MG/1
120 TABLET, EXTENDED RELEASE ORAL DAILY
Status: DISCONTINUED | OUTPATIENT
Start: 2017-12-09 | End: 2017-12-12 | Stop reason: HOSPADM

## 2017-12-09 RX ORDER — HYDRALAZINE HYDROCHLORIDE 50 MG/1
100 TABLET, FILM COATED ORAL 3 TIMES DAILY
Status: DISCONTINUED | OUTPATIENT
Start: 2017-12-09 | End: 2017-12-12 | Stop reason: HOSPADM

## 2017-12-09 RX ORDER — 0.9 % SODIUM CHLORIDE 0.9 %
250 INTRAVENOUS SOLUTION INTRAVENOUS ONCE
Status: COMPLETED | OUTPATIENT
Start: 2017-12-09 | End: 2017-12-09

## 2017-12-09 RX ORDER — DEXTROSE MONOHYDRATE 25 G/50ML
12.5 INJECTION, SOLUTION INTRAVENOUS PRN
Status: DISCONTINUED | OUTPATIENT
Start: 2017-12-09 | End: 2017-12-12 | Stop reason: HOSPADM

## 2017-12-09 RX ORDER — DEXTROSE MONOHYDRATE 50 MG/ML
100 INJECTION, SOLUTION INTRAVENOUS PRN
Status: DISCONTINUED | OUTPATIENT
Start: 2017-12-09 | End: 2017-12-10

## 2017-12-09 RX ORDER — ONDANSETRON 2 MG/ML
4 INJECTION INTRAMUSCULAR; INTRAVENOUS EVERY 6 HOURS PRN
Status: DISCONTINUED | OUTPATIENT
Start: 2017-12-09 | End: 2017-12-12 | Stop reason: HOSPADM

## 2017-12-09 RX ORDER — SODIUM CHLORIDE 0.9 % (FLUSH) 0.9 %
10 SYRINGE (ML) INJECTION EVERY 12 HOURS SCHEDULED
Status: DISCONTINUED | OUTPATIENT
Start: 2017-12-09 | End: 2017-12-12 | Stop reason: HOSPADM

## 2017-12-09 RX ORDER — NIFEDIPINE 60 MG/1
60 TABLET, EXTENDED RELEASE ORAL DAILY
Status: DISCONTINUED | OUTPATIENT
Start: 2017-12-09 | End: 2017-12-12 | Stop reason: HOSPADM

## 2017-12-09 RX ORDER — SODIUM CHLORIDE 0.9 % (FLUSH) 0.9 %
10 SYRINGE (ML) INJECTION PRN
Status: DISCONTINUED | OUTPATIENT
Start: 2017-12-09 | End: 2017-12-12 | Stop reason: HOSPADM

## 2017-12-09 RX ORDER — SIMVASTATIN 40 MG
40 TABLET ORAL NIGHTLY
Status: DISCONTINUED | OUTPATIENT
Start: 2017-12-09 | End: 2017-12-12 | Stop reason: HOSPADM

## 2017-12-09 RX ORDER — NICOTINE POLACRILEX 4 MG
15 LOZENGE BUCCAL PRN
Status: DISCONTINUED | OUTPATIENT
Start: 2017-12-09 | End: 2017-12-12 | Stop reason: HOSPADM

## 2017-12-09 RX ORDER — SODIUM CHLORIDE 9 MG/ML
INJECTION, SOLUTION INTRAVENOUS CONTINUOUS
Status: DISCONTINUED | OUTPATIENT
Start: 2017-12-09 | End: 2017-12-10

## 2017-12-09 RX ORDER — DOCUSATE SODIUM 100 MG/1
100 CAPSULE, LIQUID FILLED ORAL 2 TIMES DAILY
Status: DISCONTINUED | OUTPATIENT
Start: 2017-12-09 | End: 2017-12-09

## 2017-12-09 RX ORDER — LEVOTHYROXINE SODIUM 0.07 MG/1
150 TABLET ORAL DAILY
Status: DISCONTINUED | OUTPATIENT
Start: 2017-12-09 | End: 2017-12-12 | Stop reason: HOSPADM

## 2017-12-09 RX ORDER — VALSARTAN 160 MG/1
320 TABLET ORAL DAILY
Status: DISCONTINUED | OUTPATIENT
Start: 2017-12-09 | End: 2017-12-12 | Stop reason: HOSPADM

## 2017-12-09 RX ADMIN — HYDRALAZINE HYDROCHLORIDE 100 MG: 50 TABLET, FILM COATED ORAL at 16:46

## 2017-12-09 RX ADMIN — HEPARIN SODIUM 5000 UNITS: 5000 INJECTION INTRAVENOUS; SUBCUTANEOUS at 16:47

## 2017-12-09 RX ADMIN — METOCLOPRAMIDE 5 MG: 10 TABLET ORAL at 20:35

## 2017-12-09 RX ADMIN — METOCLOPRAMIDE 5 MG: 10 TABLET ORAL at 16:46

## 2017-12-09 RX ADMIN — ONDANSETRON 4 MG: 2 INJECTION INTRAMUSCULAR; INTRAVENOUS at 19:57

## 2017-12-09 RX ADMIN — NIFEDIPINE 60 MG: 60 TABLET, FILM COATED, EXTENDED RELEASE ORAL at 16:46

## 2017-12-09 RX ADMIN — SODIUM CHLORIDE 250 ML: 9 INJECTION, SOLUTION INTRAVENOUS at 03:05

## 2017-12-09 RX ADMIN — HEPARIN SODIUM 5000 UNITS: 5000 INJECTION INTRAVENOUS; SUBCUTANEOUS at 06:10

## 2017-12-09 RX ADMIN — ACETAMINOPHEN 650 MG: 325 TABLET ORAL at 23:23

## 2017-12-09 RX ADMIN — INSULIN LISPRO 3 UNITS: 100 INJECTION, SOLUTION INTRAVENOUS; SUBCUTANEOUS at 20:36

## 2017-12-09 RX ADMIN — INSULIN GLARGINE 5 UNITS: 100 INJECTION, SOLUTION SUBCUTANEOUS at 20:35

## 2017-12-09 RX ADMIN — DEXTROSE MONOHYDRATE 5 MG/HR: 50 INJECTION, SOLUTION INTRAVENOUS at 04:21

## 2017-12-09 RX ADMIN — METOPROLOL TARTRATE 100 MG: 50 TABLET ORAL at 20:35

## 2017-12-09 RX ADMIN — Medication 10 ML: at 09:04

## 2017-12-09 RX ADMIN — SODIUM CHLORIDE 9 UNITS/HR: 9 INJECTION, SOLUTION INTRAVENOUS at 09:07

## 2017-12-09 RX ADMIN — SODIUM CHLORIDE: 9 INJECTION, SOLUTION INTRAVENOUS at 18:36

## 2017-12-09 RX ADMIN — VALSARTAN 320 MG: 160 TABLET ORAL at 16:47

## 2017-12-09 RX ADMIN — ROPINIROLE HYDROCHLORIDE 2 MG: 1 TABLET, FILM COATED ORAL at 20:35

## 2017-12-09 RX ADMIN — DEXTROSE MONOHYDRATE 5 MG/HR: 50 INJECTION, SOLUTION INTRAVENOUS at 16:36

## 2017-12-09 RX ADMIN — AMLODIPINE BESYLATE 5 MG: 5 TABLET ORAL at 16:46

## 2017-12-09 RX ADMIN — ACETAMINOPHEN 650 MG: 325 TABLET ORAL at 19:02

## 2017-12-09 RX ADMIN — ISOSORBIDE MONONITRATE 120 MG: 60 TABLET, EXTENDED RELEASE ORAL at 16:46

## 2017-12-09 RX ADMIN — SIMVASTATIN 40 MG: 40 TABLET, FILM COATED ORAL at 20:35

## 2017-12-09 RX ADMIN — Medication 10 ML: at 21:05

## 2017-12-09 RX ADMIN — HYDRALAZINE HYDROCHLORIDE 100 MG: 50 TABLET, FILM COATED ORAL at 20:35

## 2017-12-09 RX ADMIN — DULOXETINE HYDROCHLORIDE 60 MG: 60 CAPSULE, DELAYED RELEASE ORAL at 16:46

## 2017-12-09 RX ADMIN — DEXTROSE MONOHYDRATE 5 MG/HR: 50 INJECTION, SOLUTION INTRAVENOUS at 21:06

## 2017-12-09 RX ADMIN — SODIUM CHLORIDE: 9 INJECTION, SOLUTION INTRAVENOUS at 03:21

## 2017-12-09 RX ADMIN — HEPARIN SODIUM 5000 UNITS: 5000 INJECTION INTRAVENOUS; SUBCUTANEOUS at 22:19

## 2017-12-09 RX ADMIN — LEVOTHYROXINE SODIUM 150 MCG: 75 TABLET ORAL at 16:47

## 2017-12-09 ASSESSMENT — PAIN DESCRIPTION - PAIN TYPE
TYPE: ACUTE PAIN

## 2017-12-09 ASSESSMENT — PAIN SCALES - GENERAL
PAINLEVEL_OUTOF10: 0
PAINLEVEL_OUTOF10: 8
PAINLEVEL_OUTOF10: 3
PAINLEVEL_OUTOF10: 0
PAINLEVEL_OUTOF10: 9
PAINLEVEL_OUTOF10: 0
PAINLEVEL_OUTOF10: 0

## 2017-12-09 ASSESSMENT — PAIN DESCRIPTION - DESCRIPTORS
DESCRIPTORS: ACHING

## 2017-12-09 ASSESSMENT — PAIN DESCRIPTION - ORIENTATION
ORIENTATION: ANTERIOR

## 2017-12-09 ASSESSMENT — PAIN DESCRIPTION - LOCATION
LOCATION: HEAD

## 2017-12-09 NOTE — ED PROVIDER NOTES
[facility]  eMERGENCY dEPARTMENT eNCOUnter      Pt Name: Rody Martinez  MRN: [id number]  [place] 1970  Date of evaluation: 12/8/2017  Provider: Kimberly Barone MD    CHIEF COMPLAINT       Chief Complaint   Patient presents with    Hyperglycemia     too high to read on monitor. responsive to pain on scene per ems. missed dialysis yesterday         HISTORY OF PRESENT ILLNESS   (Location/Symptom, Timing/Onset, Context/Setting, Quality, Duration, Modifying Factors, Severity)  Note limiting factors. Rody Martinez is a 49 y.o. female who presents to the emergency department Via EMS secondary to altered mental status hyperglycemia. Patient's family reports that she has been confused throughout the course of the day, missed her dialysis yesterday as she did not feel well. EMS reported that patient's Accu-Chek was too high to read. On arrival to the ED only minimal history is able to be obtained. Patient is confused and not able to provide a comprehensive history. She denies any pain. I reviewed patient's previous inpatient hospitalization records. It seems that she has a previous history of hypertensive urgency with a recent inpatient hospitalization within the last 1 month for similar events. [facility]    Nursing Notes were reviewed.     REVIEW OF SYSTEMS    (2-9 systems for level 4, 10 or more for level 5)     Review of Systems   Unable to perform ROS: Mental status change            PAST MEDICAL HISTORY     Past Medical History:   Diagnosis Date    Arthritis     Chronic kidney disease, stage 5, kidney failure ([address]     DM (diabetes mellitus) type II controlled with renal manifestation ([address] 06/1993    Gastroparesis     GERD (gastroesophageal reflux disease)     Hypertension     Hypothyroid     Restless leg syndrome          SURGICAL HISTORY       Past Surgical History:   Procedure Laterality Date    BREAST SURGERY      infected milk gland removed    CHOLECYSTECTOMY      COLONOSCOPY      ENDOMETRIAL ABLATION  2012    GALLBLADDER SURGERY      TX COLONOSCOPY W/BIOPSY SINGLE/MULTIPLE N/A 10/20/2017    Dr Bishop Vail prep-Tubular AP (-) dysplasia x 2--3 yr recall    TX EGD TRANSORAL BIOPSY SINGLE/MULTIPLE N/A 12/27/2016    Dr Claudia Ahn EGD TRANSORAL BIOPSY SINGLE/MULTIPLE N/A 10/20/2017    Dr Grecia Aiken (-)    TUBAL LIGATION      VASCULAR SURGERY Left 2016    fistula by Dr David Mckinnon at [address]  10/02/2017    [facility]. Left upper fistulograms/venograms, balloon angioplasty cephalic vein arch [id number] conquest.         CURRENT MEDICATIONS       Previous Medications    AMLODIPINE (NORVASC) 5 MG TABLET    Take 1 tablet by mouth daily    BETHANECHOL (URECHOLINE) 25 MG TABLET    Take 1 tablet by mouth 4 times daily (with meals and nightly)    CHOLECALCIFEROL (VITAMIN D3) 50674 UNITS CAPS    Take 1 capsule by mouth once a week On Mondays    CLONIDINE (CATAPRES) 0.1 MG TABLET    Take 0.1 mg by mouth    DOCUSATE SODIUM (STOOL SOFTENER) 100 MG CAPSULE    Take 300 mg by mouth nightly     DULOXETINE (CYMBALTA) 60 MG EXTENDED RELEASE CAPSULE    Take 60 mg by mouth daily    HYDRALAZINE (APRESOLINE) 100 MG TABLET    Take 1 tablet by mouth 3 times daily    INSULIN ASPART (NOVOLOG FLEXPEN) 100 UNIT/ML INJECTION PEN    Inject 5 Units into the skin 3 times daily    INSULIN DETEMIR (LEVEMIR FLEXTOUCH) 100 UNIT/ML INJECTION PEN    Inject 5 Units into the skin nightly    ISOSORBIDE MONONITRATE (IMDUR) 120 MG EXTENDED RELEASE TABLET    Take 1 tablet by mouth daily    LEVOTHYROXINE (SYNTHROID) 150 MCG TABLET    Take 150 mcg by mouth Daily    METOCLOPRAMIDE (REGLAN) 5 MG TABLET    Take 1 tablet by mouth 4 times daily (before meals and nightly)    METOPROLOL (LOPRESSOR) 100 MG TABLET    Take 100 mg by mouth 2 times daily    NIFEDIPINE (ADALAT CC) 60 MG EXTENDED RELEASE TABLET    Take 60 mg by mouth daily    NUTRITIONAL SUPPLEMENTS (GLUCOSE MANAGEMENT) TABS    Take by mouth    ONDANSETRON (ZOFRAN) 4 MG distress. Abdominal: Soft. She exhibits no distension. There is no tenderness. There is no guarding. Musculoskeletal: She exhibits no edema, tenderness or deformity. Neurological: She is easily aroused. She has normal strength. GCS eye subscore is 3. GCS verbal subscore is 4. GCS motor subscore is 5. A complete and comprehensive neurologic exam is unable to perform secondary to patient's altered mental status. She does seem to be moving upper and lower extremities bilaterally. Skin: No rash noted. No pallor. Capillary refill 3-4 seconds. Nursing note and vitals reviewed. DIAGNOSTIC RESULTS     EKG: All EKG's are interpreted by the Emergency Department Physician who either signs or Co-signs this chart in the absence of a cardiologist.    [phone]: NSR @ 78, no ST changes    RADIOLOGY:   Non-plain film images such as CT, Ultrasound and MRI are read by the radiologist. Plain radiographic images are visualized and preliminarily interpreted by the emergency physician with the below findings:        CT Head WO Contrast    (Results Pending)     CT Brain: No acute intracranial process    CT scan interpretation has been performed by [facility] on a preliminary basis. Medical decision making along with patient disposition has occurred utilizing the above interpretation available to me at the time of patient's ED encounter.         LABS:  Labs Reviewed   BLOOD GAS, ARTERIAL - Abnormal; Notable for the following:        Result Value    pH, Arterial 7.270 (*)     pO2, Arterial 64.0 (*)     HCO3, Arterial 18.8 (*)     Base Excess, Arterial -7.7 (*)     Hemoglobin, Art, Extended 11.3 (*)     All other components within normal limits    Narrative:     CALL  Randy Radford RN ER, 12/08/2017 22:21, by Kajal Radford   COMPREHENSIVE METABOLIC PANEL - Abnormal; Notable for the following:     Sodium 121 (*)     Chloride 77 (*)     CO2 17 (*)     Anion Gap 27 (*)     Glucose 834 (*)     BUN 63 (*)     CREATININE 8.3 (*) GFR Non-African American 5 (*)     Alkaline Phosphatase 108 (*)     All other components within normal limits    Narrative:     Poncho Herrera tel. ,  Chemistry results called to and read back by ROSA M RN ER, 12/08/2017 22:58, by  [facility]  Chemistry results called to and read back by ROSA M RN ER, 12/08/2017 22:58, by  [facility]   CBC WITH AUTO DIFFERENTIAL - Abnormal; Notable for the following:     RBC 3.54 (*)     Hemoglobin 11.1 (*)     Hematocrit 32.7 (*)     MCH 31.4 (*)     MPV 9.3 (*)     Basophils % 1.2 (*)     All other components within normal limits   ACETONE - Abnormal; Notable for the following:     Acetone, Bld Small (*)     All other components within normal limits   URINE CULTURE   POTASSIUM, WHOLE BLOOD   ETHANOL   MAGNESIUM   LACTIC ACID, PLASMA   URINALYSIS   URINE DRUG SCREEN       All other labs were within normal range or not returned as of this dictation.     EMERGENCY DEPARTMENT COURSE and DIFFERENTIAL DIAGNOSIS/MDM:   Vitals:    Vitals:    12/08/17 2212 12/08/17 2219 12/08/17 2233 12/08/17 2246   BP:       Pulse:       Resp:    18   Temp:       TempSrc:       SpO2: 90% (!) 88% 91%    Weight:       Height:           MDM  Number of Diagnoses or Management Options  Acute encephalopathy: new and requires workup  Chronic renal failure, stage 5 ([address]: new and requires workup  Diabetic ketoacidosis without coma associated with type 2 diabetes mellitus ([address]: new and requires workup  Hypertensive urgency, malignant: new and requires workup  Noncompliance of patient with renal dialysis [facility]): new and requires workup     Amount and/or Complexity of Data Reviewed  Clinical lab tests: ordered and reviewed  Tests in the radiology section of CPT®: ordered and reviewed  Tests in the medicine section of CPT®: reviewed and ordered  Discuss the patient with other providers: yes  Independent visualization of images, tracings, or specimens: yes    Risk of Complications, Morbidity, and/or Mortality  Presenting

## 2017-12-09 NOTE — H&P
and nightly)  bethanechol (URECHOLINE) 25 MG tablet, Take 1 tablet by mouth 4 times daily (with meals and nightly)  cloNIDine (CATAPRES) 0.1 MG tablet, Take 0.1 mg by mouth  Nutritional Supplements (GLUCOSE MANAGEMENT) TABS, Take by mouth  senna (SENOKOT) 8.6 MG TABS tablet, Take by mouth  levothyroxine (SYNTHROID) 150 MCG tablet, Take 150 mcg by mouth Daily  valsartan (DIOVAN) 320 MG tablet, Take 320 mg by mouth daily  metoprolol (LOPRESSOR) 100 MG tablet, Take 100 mg by mouth 2 times daily  NIFEdipine (ADALAT CC) 60 MG extended release tablet, Take 60 mg by mouth daily  isosorbide mononitrate (IMDUR) 120 MG extended release tablet, Take 1 tablet by mouth daily  hydrALAZINE (APRESOLINE) 100 MG tablet, Take 1 tablet by mouth 3 times daily  amLODIPine (NORVASC) 5 MG tablet, Take 1 tablet by mouth daily  insulin detemir (LEVEMIR FLEXTOUCH) 100 UNIT/ML injection pen, Inject 5 Units into the skin nightly  ondansetron (ZOFRAN) 4 MG tablet, Take 4 mg by mouth every 8 hours as needed for Nausea or Vomiting  DULoxetine (CYMBALTA) 60 MG extended release capsule, Take 60 mg by mouth daily  rOPINIRole (REQUIP) 2 MG tablet, Take 2 mg by mouth nightly  docusate sodium (STOOL SOFTENER) 100 MG capsule, Take 300 mg by mouth nightly   simvastatin (ZOCOR) 40 MG tablet, Take 40 mg by mouth daily  Cholecalciferol (VITAMIN D3) 00934 UNITS CAPS, Take 1 capsule by mouth once a week On   insulin aspart (NOVOLOG FLEXPEN) 100 UNIT/ML injection pen, Inject 5 Units into the skin 3 times daily    Allergies:  Lamisil advanced [terbinafine hcl] and Penicillins    Social History:   TOBACCO:   reports that she has been smoking Cigarettes. She has a 12.50 pack-year smoking history. She has never used smokeless tobacco.  ETOH:   reports that she does not drink alcohol.   Patient currently lives with family     Family History:       Problem Relation Age of Onset    Colon Cancer Mother       at 63    Lung Cancer Father       at 79    12/08/2017 22:21, by Millie Stevenson   COMPREHENSIVE METABOLIC PANEL - Abnormal; Notable for the following:     Sodium 121 (*)     Chloride 77 (*)     CO2 17 (*)     Anion Gap 27 (*)     Glucose 834 (*)     BUN 63 (*)     CREATININE 8.3 (*)     GFR Non-African American 5 (*)     Alkaline Phosphatase 108 (*)     All other components within normal limits    Narrative:     CALL  [place]  [facility] tel. ,  Chemistry results called to and read back by ROSA M SCHULZ ER, 12/08/2017 22:58, by  [facility]  Chemistry results called to and read back by ROSA M SCHULZ ER, 12/08/2017 22:58, by  [facility]   CBC WITH AUTO DIFFERENTIAL - Abnormal; Notable for the following:     RBC 3.54 (*)     Hemoglobin 11.1 (*)     Hematocrit 32.7 (*)     MCH 31.4 (*)     MPV 9.3 (*)     Basophils % 1.2 (*)     All other components within normal limits   ACETONE - Abnormal; Notable for the following:     Acetone, Bld Small (*)     All other components within normal limits   LACTIC ACID, PLASMA - Abnormal; Notable for the following:     Lactic Acid 3.8 (*)     All other components within normal limits    Narrative:     Katie Guillory tel. ,  Chemistry results called to and read back by Wiley Benson RN ICU, 12/09/2017  04:07, by [facility]  Collection has been rescheduled by AIME at 12/9/17 01:46.  Reason:   Failed   attempt at venipuncture   URINALYSIS - Abnormal; Notable for the following:     Glucose, Ur >=1000 (*)     Ketones, Urine TRACE (*)     Blood, Urine SMALL (*)     All other components within normal limits   MICROSCOPIC URINALYSIS - Abnormal; Notable for the following:     RBC, UA 9 (*)     All other components within normal limits   GLUCOSE - Abnormal; Notable for the following:     Glucose 710 (*)     All other components within normal limits    Narrative:     CALL  [place]  CRISTIBETTINAU tel. ,  Chemistry results called to and read back by Anahi Conte RN ICU, 12/09/2017  01:36, by [facility]   OSMOLALITY, SERUM - Abnormal; Notable for the following:     Osmolality 317 (*)     All other

## 2017-12-09 NOTE — PROGRESS NOTES
BLOOD GAS, ARTERIAL [[id number]] (Abnormal) Collected: 12/08/17 2220     Specimen: Blood gases Updated: 12/08/17 2221      pH, Arterial 7.270 (LL)      pCO2, Arterial 41.0 mmHg       pO2, Arterial 64.0 (L) mmHg       HCO3, Arterial 18.8 (L) mmol/L       Base Excess, Arterial -7.7 (L) mmol/L       Hemoglobin, Art, Extended 11.3 (L) g/dL       O2 Sat, Arterial 90.6 %       Carboxyhgb, Arterial 2.1 %       Methemoglobin, Arterial 0.7 %       O2 Content, Arterial 14.4 mL/dL      Pt on room air  resp rate 16  Right radial puncture AT+

## 2017-12-09 NOTE — CONSULTS
Nephrology ([address]) Consult Note      Patient:  Varinder Stanton  YOB: 1970  Date of Service: 12/9/2017  MRN: [id number]   Acct: [de-identified]   Primary Care Physician: ROMINA Zarco  Advance Directive: Full Code  Admit Date: 12/8/2017       Hospital Day: 0  Referring Provider: Twila Yarbrough MD    Patient independently seen and examined, Chart, Consults, Notes, Operative notes, Labs, Cardiology, and Radiology studies reviewed as able. Subjective:  Varinder Stanton is a 49 y.o. female  whom we were consulted for ESRD. MWF HD pt. Missed HD 12/8. Presented to ED with hyperglycemia/AMS/HTN. Slowly improving per RN. No n/v/d/cp.     Allergies:  Lamisil advanced [terbinafine hcl] and Penicillins    Medicines:  Current Facility-Administered Medications   Medication Dose Route Frequency Provider Last Rate Last Dose    sodium chloride flush 0.9 % injection 10 mL  10 mL Intravenous 2 times per day Daniella Agee MD   10 mL at 12/09/17 0904    sodium chloride flush 0.9 % injection 10 mL  10 mL Intravenous PRN Daniella Agee MD        acetaminophen (TYLENOL) tablet 650 mg  650 mg Oral Q4H PRN Daniella Agee MD        bisacodyl (DULCOLAX) suppository 10 mg  10 mg Rectal Daily PRN Daniella Agee MD        ondansetron (ZOFRAN) injection 4 mg  4 mg Intravenous Q6H PRN Daniel Jacobson MD        heparin (porcine) injection 5,000 Units  5,000 Units Subcutaneous 3 times per day Daniella Agee MD   5,000 Units at 12/09/17 0610    insulin regular (HUMULIN R;NOVOLIN R) 100 Units in sodium chloride 0.9 % 100 mL infusion  0.5 Units/hr Intravenous Continuous Daniella Agee MD   Stopped at 12/09/17 1123    glucose (GLUTOSE) 40 % oral gel 15 g  15 g Oral PRN Daniel Jacobson MD        dextrose 50 % solution 12.5 g  12.5 g Intravenous PRN Daniel Jacobson MD        glucagon (rDNA) injection 1 mg  1 mg Intramuscular PRN Daniel Jacobson MD        dextrose 5 % solution  100 mL/hr Intravenous PRN Priscilla Sanchez MD        0.9 % sodium chloride infusion   Intravenous Continuous Rosa Elena Jacobson MD 50 mL/hr at 17 0321      niCARdipine (CARDENE) 25 mg in dextrose 5 % 250 mL infusion  5 mg/hr Intravenous Continuous Portillo Montenegro .5 mL/hr at 17 1037 5 mcg/kg/min at 17 1037       Past Medical History:  Past Medical History:   Diagnosis Date    Arthritis     Chronic kidney disease, stage 5, kidney failure ([address]     DM (diabetes mellitus) type II controlled with renal manifestation ([address] 1993    Gastroparesis     GERD (gastroesophageal reflux disease)     Hypertension     Hypothyroid     Restless leg syndrome        Past Surgical History:  Past Surgical History:   Procedure Laterality Date    BREAST SURGERY      infected milk gland removed    CHOLECYSTECTOMY      COLONOSCOPY      ENDOMETRIAL ABLATION      GALLBLADDER SURGERY      MO COLONOSCOPY W/BIOPSY SINGLE/MULTIPLE N/A 10/20/2017    Dr Bishop Vail prep-Tubular AP (-) dysplasia x 2--3 yr recall    MO EGD TRANSORAL BIOPSY SINGLE/MULTIPLE N/A 2016    Dr Claudia Ahn EGD TRANSORAL BIOPSY SINGLE/MULTIPLE N/A 10/20/2017    Dr Grecia Aiken (-)    TUBAL LIGATION      VASCULAR SURGERY Left 2016    fistula by Dr David Mckinnon at [address]  10/02/2017    [facility]. Left upper fistulograms/venograms, balloon angioplasty cephalic vein arch 48B22 conquest.       Family History  Family History   Problem Relation Age of Onset    Colon Cancer Mother       at 63    Lung Cancer Father       at 66    Stomach Cancer Sister     Breast Cancer Sister     Depression Daughter 25     committed suicide       Social History  Social History     Social History    Marital status:      Spouse name: N/A    Number of children: 2    Years of education: N/A     Occupational History    Not on file.      Social History Main Topics    Smoking status: Current Every Day Smoker hours.  APTT: No results for input(s): APTT in the last 72 hours. BNP:  No results for input(s): BNP in the last 72 hours. Ionized Calcium:No results for input(s): IONCA in the last 72 hours. Magnesium:  Recent Labs      12/08/17   2214   MG  2.5     Phosphorus:No results for input(s): PHOS in the last 72 hours. HgbA1C:   Recent Labs      12/09/17   0336   LABA1C  9.9*     Hepatic: Recent Labs      12/08/17   2214   ALKPHOS  108*   ALT  10   AST  12   PROT  7.0   BILITOT  0.3   LABALBU  3.7     Lactic Acid:   Recent Labs      12/09/17   0742   LACTA  2.3*     Troponin: No results for input(s): CKTOTAL, CKMB, TROPONINT in the last 72 hours. ABGs: No results for input(s): PH, PCO2, PO2, HCO3, O2SAT in the last 72 hours. CRP:  No results for input(s): CRP in the last 72 hours. Sed Rate:  No results for input(s): SEDRATE in the last 72 hours. Cultures:   No results for input(s): CULTURE in the last 72 hours. No results for input(s): BC, Shyanne Luciano in the last 72 hours. No results for input(s): CXSURG in the last 72 hours. Radiology reports as per the Radiologist  Radiology: Ct Head Wo Contrast    Result Date: 12/9/2017  CT HEAD WO CONTRAST 12/8/2017 10:00 PM History: Decreased alertness Comparisons: 11/26/2017 Technique: Radiation dose equals DLP 1514 mGy cm. AUTOMATED EXPOSURE CONTROL dose reduction technique was implemented CT evaluation of the head without intravenous contrast. 5 mm transaxial images were obtained. 2-D sagittal and coronal reconstruction images were generated. Findings: Examination was sent to statrad for preliminary interpretation There is no intra or extra-axial hemorrhage. There is no mass effect or midline shift. There is no hydrocephalus. There is mild central and cortical atrophy. Mild decreased attenuation in the periventricular white matter particularly in the frontal horn regions question suggesting mild chronic ischemic changes.  Bone window imaging reveals no calvarial

## 2017-12-09 NOTE — ED NOTES
Pt repositioned in bed. She is saying \"what happened, I need help\" repeatedly.  Reoriented pt     Nery Castro RN  12/09/17 2394

## 2017-12-10 LAB
ALBUMIN SERPL-MCNC: 2.5 G/DL (ref 3.5–5.2)
ALP BLD-CCNC: 75 U/L (ref 35–104)
ALT SERPL-CCNC: 7 U/L (ref 5–33)
ANION GAP SERPL CALCULATED.3IONS-SCNC: 16 MMOL/L (ref 7–19)
AST SERPL-CCNC: 13 U/L (ref 5–32)
BASOPHILS ABSOLUTE: 0.1 K/UL (ref 0–0.2)
BASOPHILS RELATIVE PERCENT: 1.2 % (ref 0–1)
BILIRUB SERPL-MCNC: <0.2 MG/DL (ref 0.2–1.2)
BUN BLDV-MCNC: 26 MG/DL (ref 6–20)
CALCIUM SERPL-MCNC: 8.3 MG/DL (ref 8.6–10)
CHLORIDE BLD-SCNC: 93 MMOL/L (ref 98–111)
CO2: 23 MMOL/L (ref 22–29)
CREAT SERPL-MCNC: 5.4 MG/DL (ref 0.5–0.9)
EOSINOPHILS ABSOLUTE: 0.1 K/UL (ref 0–0.6)
EOSINOPHILS RELATIVE PERCENT: 1.3 % (ref 0–5)
GFR NON-AFRICAN AMERICAN: 8
GLUCOSE BLD-MCNC: 102 MG/DL (ref 70–99)
GLUCOSE BLD-MCNC: 129 MG/DL (ref 70–99)
GLUCOSE BLD-MCNC: 202 MG/DL (ref 70–99)
GLUCOSE BLD-MCNC: 227 MG/DL (ref 74–109)
GLUCOSE BLD-MCNC: 329 MG/DL (ref 70–99)
HCT VFR BLD CALC: 27.3 % (ref 37–47)
HEMOGLOBIN: 9.1 G/DL (ref 12–16)
LYMPHOCYTES ABSOLUTE: 1.7 K/UL (ref 1.1–4.5)
LYMPHOCYTES RELATIVE PERCENT: 19.3 % (ref 20–40)
MCH RBC QN AUTO: 31.5 PG (ref 27–31)
MCHC RBC AUTO-ENTMCNC: 33.3 G/DL (ref 33–37)
MCV RBC AUTO: 94.5 FL (ref 81–99)
MONOCYTES ABSOLUTE: 0.8 K/UL (ref 0–0.9)
MONOCYTES RELATIVE PERCENT: 9.3 % (ref 0–10)
MRSA CULTURE ONLY: NORMAL
NEUTROPHILS ABSOLUTE: 5.9 K/UL (ref 1.5–7.5)
NEUTROPHILS RELATIVE PERCENT: 68.6 % (ref 50–65)
PDW BLD-RTO: 14.3 % (ref 11.5–14.5)
PERFORMED ON: ABNORMAL
PLATELET # BLD: 178 K/UL (ref 130–400)
PMV BLD AUTO: 9.1 FL (ref 9.4–12.3)
POTASSIUM SERPL-SCNC: 4.3 MMOL/L (ref 3.5–5)
RBC # BLD: 2.89 M/UL (ref 4.2–5.4)
SODIUM BLD-SCNC: 132 MMOL/L (ref 136–145)
TOTAL PROTEIN: 5.5 G/DL (ref 6.6–8.7)
WBC # BLD: 8.6 K/UL (ref 4.8–10.8)

## 2017-12-10 PROCEDURE — 99233 SBSQ HOSP IP/OBS HIGH 50: CPT | Performed by: FAMILY MEDICINE

## 2017-12-10 PROCEDURE — 2700000000 HC OXYGEN THERAPY PER DAY

## 2017-12-10 PROCEDURE — 1210000000 HC MED SURG R&B

## 2017-12-10 PROCEDURE — 82948 REAGENT STRIP/BLOOD GLUCOSE: CPT

## 2017-12-10 PROCEDURE — 6360000002 HC RX W HCPCS: Performed by: INTERNAL MEDICINE

## 2017-12-10 PROCEDURE — 80053 COMPREHEN METABOLIC PANEL: CPT

## 2017-12-10 PROCEDURE — 6370000000 HC RX 637 (ALT 250 FOR IP): Performed by: INTERNAL MEDICINE

## 2017-12-10 PROCEDURE — 85025 COMPLETE CBC W/AUTO DIFF WBC: CPT

## 2017-12-10 PROCEDURE — 2580000003 HC RX 258: Performed by: INTERNAL MEDICINE

## 2017-12-10 PROCEDURE — 36415 COLL VENOUS BLD VENIPUNCTURE: CPT

## 2017-12-10 PROCEDURE — 6370000000 HC RX 637 (ALT 250 FOR IP): Performed by: FAMILY MEDICINE

## 2017-12-10 RX ORDER — CLONIDINE HYDROCHLORIDE 0.2 MG/1
0.2 TABLET ORAL 3 TIMES DAILY
Status: DISCONTINUED | OUTPATIENT
Start: 2017-12-10 | End: 2017-12-12 | Stop reason: HOSPADM

## 2017-12-10 RX ORDER — INSULIN GLARGINE 100 [IU]/ML
10 INJECTION, SOLUTION SUBCUTANEOUS NIGHTLY
Status: DISCONTINUED | OUTPATIENT
Start: 2017-12-10 | End: 2017-12-12 | Stop reason: HOSPADM

## 2017-12-10 RX ORDER — DOCUSATE SODIUM 100 MG/1
300 CAPSULE, LIQUID FILLED ORAL NIGHTLY
Status: DISCONTINUED | OUTPATIENT
Start: 2017-12-10 | End: 2017-12-12 | Stop reason: HOSPADM

## 2017-12-10 RX ORDER — INSULIN GLARGINE 100 [IU]/ML
10 INJECTION, SOLUTION SUBCUTANEOUS ONCE
Status: COMPLETED | OUTPATIENT
Start: 2017-12-10 | End: 2017-12-10

## 2017-12-10 RX ORDER — HYDRALAZINE HYDROCHLORIDE 20 MG/ML
10 INJECTION INTRAMUSCULAR; INTRAVENOUS EVERY 6 HOURS PRN
Status: DISCONTINUED | OUTPATIENT
Start: 2017-12-10 | End: 2017-12-12 | Stop reason: HOSPADM

## 2017-12-10 RX ADMIN — METOCLOPRAMIDE 5 MG: 10 TABLET ORAL at 22:18

## 2017-12-10 RX ADMIN — CLONIDINE HYDROCHLORIDE 0.2 MG: 0.2 TABLET ORAL at 14:18

## 2017-12-10 RX ADMIN — ROPINIROLE HYDROCHLORIDE 2 MG: 1 TABLET, FILM COATED ORAL at 22:17

## 2017-12-10 RX ADMIN — Medication 10 ML: at 08:24

## 2017-12-10 RX ADMIN — HYDRALAZINE HYDROCHLORIDE 100 MG: 50 TABLET, FILM COATED ORAL at 08:20

## 2017-12-10 RX ADMIN — HEPARIN SODIUM 5000 UNITS: 5000 INJECTION INTRAVENOUS; SUBCUTANEOUS at 22:17

## 2017-12-10 RX ADMIN — DOCUSATE SODIUM 300 MG: 100 CAPSULE, LIQUID FILLED ORAL at 22:17

## 2017-12-10 RX ADMIN — CLONIDINE HYDROCHLORIDE 0.2 MG: 0.2 TABLET ORAL at 22:18

## 2017-12-10 RX ADMIN — METOPROLOL TARTRATE 100 MG: 50 TABLET ORAL at 08:21

## 2017-12-10 RX ADMIN — AMLODIPINE BESYLATE 5 MG: 5 TABLET ORAL at 08:22

## 2017-12-10 RX ADMIN — INSULIN LISPRO 4 UNITS: 100 INJECTION, SOLUTION INTRAVENOUS; SUBCUTANEOUS at 12:39

## 2017-12-10 RX ADMIN — ISOSORBIDE MONONITRATE 120 MG: 60 TABLET, EXTENDED RELEASE ORAL at 08:20

## 2017-12-10 RX ADMIN — HYDRALAZINE HYDROCHLORIDE 100 MG: 50 TABLET, FILM COATED ORAL at 14:18

## 2017-12-10 RX ADMIN — HEPARIN SODIUM 5000 UNITS: 5000 INJECTION INTRAVENOUS; SUBCUTANEOUS at 14:19

## 2017-12-10 RX ADMIN — VALSARTAN 320 MG: 160 TABLET ORAL at 08:20

## 2017-12-10 RX ADMIN — HEPARIN SODIUM 5000 UNITS: 5000 INJECTION INTRAVENOUS; SUBCUTANEOUS at 06:06

## 2017-12-10 RX ADMIN — DULOXETINE HYDROCHLORIDE 60 MG: 60 CAPSULE, DELAYED RELEASE ORAL at 08:21

## 2017-12-10 RX ADMIN — INSULIN GLARGINE 10 UNITS: 100 INJECTION, SOLUTION SUBCUTANEOUS at 22:19

## 2017-12-10 RX ADMIN — NIFEDIPINE 60 MG: 60 TABLET, FILM COATED, EXTENDED RELEASE ORAL at 08:21

## 2017-12-10 RX ADMIN — HYDRALAZINE HYDROCHLORIDE 100 MG: 50 TABLET, FILM COATED ORAL at 22:17

## 2017-12-10 RX ADMIN — METOCLOPRAMIDE 5 MG: 10 TABLET ORAL at 11:47

## 2017-12-10 RX ADMIN — INSULIN LISPRO 8 UNITS: 100 INJECTION, SOLUTION INTRAVENOUS; SUBCUTANEOUS at 07:32

## 2017-12-10 RX ADMIN — METOCLOPRAMIDE 5 MG: 10 TABLET ORAL at 17:24

## 2017-12-10 RX ADMIN — CLONIDINE HYDROCHLORIDE 0.2 MG: 0.2 TABLET ORAL at 11:58

## 2017-12-10 RX ADMIN — INSULIN GLARGINE 10 UNITS: 100 INJECTION, SOLUTION SUBCUTANEOUS at 14:45

## 2017-12-10 RX ADMIN — LEVOTHYROXINE SODIUM 150 MCG: 75 TABLET ORAL at 07:34

## 2017-12-10 RX ADMIN — METOCLOPRAMIDE 5 MG: 10 TABLET ORAL at 07:33

## 2017-12-10 RX ADMIN — METOPROLOL TARTRATE 100 MG: 50 TABLET ORAL at 22:18

## 2017-12-10 RX ADMIN — SIMVASTATIN 40 MG: 40 TABLET, FILM COATED ORAL at 22:18

## 2017-12-10 ASSESSMENT — PAIN SCALES - GENERAL
PAINLEVEL_OUTOF10: 0

## 2017-12-10 NOTE — PROGRESS NOTES
12 Hr chart check done. On coming nurse informed of new SSI orders with insulin drip off today. Pt now wide awake & appropriate. Cardene drip continues.

## 2017-12-10 NOTE — PROGRESS NOTES
Nephrology ([facility]) Progress Note    Patient:  Princess Riley  YOB: 1970  Date of Service: 12/10/2017  MRN: [id number]   Acct: [de-identified]   Primary Care Physician: ROMINA Bales  Advance Directive: Full Code  Admit Date: 12/8/2017       Hospital Day: 1  Referring Provider: Noelle Wright MD    Patient independently seen and examined, Chart, Consults, Notes, Operative notes, Labs, Cardiology, and Radiology studies reviewed as able. Subjective:  Princess Riley is a 49 y.o. female  whom we were consulted for ESRD. MWF HD pt. Missed HD 12/8. Presented to ED with hyperglycemia/AMS/HTN. Much more alert today. No n/v/d/cp. Recalls taking clonidine 0.2mg at home (not on med list).        Allergies:  Lamisil advanced [terbinafine hcl] and Penicillins    Medicines:  Current Facility-Administered Medications   Medication Dose Route Frequency Provider Last Rate Last Dose    cloNIDine (CATAPRES) tablet 0.2 mg  0.2 mg Oral TID Shanika Barnhart MD        sodium chloride flush 0.9 % injection 10 mL  10 mL Intravenous 2 times per day Lyric Harris MD   10 mL at 12/10/17 0824    sodium chloride flush 0.9 % injection 10 mL  10 mL Intravenous PRN Lyric Harris MD        acetaminophen (TYLENOL) tablet 650 mg  650 mg Oral Q4H PRN Lyric Harris MD   650 mg at 12/09/17 2323    bisacodyl (DULCOLAX) suppository 10 mg  10 mg Rectal Daily PRN Lyric Harris MD        ondansetron (ZOFRAN) injection 4 mg  4 mg Intravenous Q6H PRN Daniel Jacobson MD   4 mg at 12/09/17 1957    heparin (porcine) injection 5,000 Units  5,000 Units Subcutaneous 3 times per day Lyric Harris MD   5,000 Units at 12/10/17 0606    dextrose 5 % solution  100 mL/hr Intravenous PRN Lyric Harris MD        0.9 % sodium chloride infusion   Intravenous Continuous Lyric Harris MD   Stopped at 12/10/17 0826    DULoxetine (CYMBALTA) extended release capsule 60 mg  60 mg Oral Daily aNgi Aguilar MD   60 mg at 12/10/17 5747    hydrALAZINE (APRESOLINE) tablet 100 mg  100 mg Oral TID Nagi Aguilra MD   100 mg at 12/10/17 0820    insulin glargine (LANTUS) injection vial 5 Units  5 Units Subcutaneous Nightly Nagi Aguilar MD   5 Units at 12/09/17 2035    levothyroxine (SYNTHROID) tablet 150 mcg  150 mcg Oral Daily Nagi Aguilar MD   150 mcg at 12/10/17 0734    metoclopramide (REGLAN) tablet 5 mg  5 mg Oral 4x Daily AC & HS Nagi Aguilar MD   5 mg at 12/10/17 1147    rOPINIRole (REQUIP) tablet 2 mg  2 mg Oral Nightly Nagi Aguilar MD   2 mg at 12/09/17 2035    amLODIPine (NORVASC) tablet 5 mg  5 mg Oral Daily Nagi Aguilar MD   5 mg at 12/10/17 9333    isosorbide mononitrate (IMDUR) extended release tablet 120 mg  120 mg Oral Daily Nagi Aguilar MD   120 mg at 12/10/17 0820    metoprolol tartrate (LOPRESSOR) tablet 100 mg  100 mg Oral BID Nagi Aguilar MD   100 mg at 12/10/17 2621    NIFEdipine (PROCARDIA XL) extended release tablet 60 mg  60 mg Oral Daily Nagi Aguilar MD   60 mg at 12/10/17 0821    ondansetron (ZOFRAN) tablet 4 mg  4 mg Oral Q8H PRN Nagi Aguilar MD        valsartan (DIOVAN) tablet 320 mg  320 mg Oral Daily Nagi Aguilar MD   320 mg at 12/10/17 0820    simvastatin (ZOCOR) tablet 40 mg  40 mg Oral Nightly Nagi Aguilar MD   40 mg at 12/09/17 2035    glucose (GLUTOSE) 40 % oral gel 15 g  15 g Oral PRN Nagi Aguilar MD        dextrose 50 % solution 12.5 g  12.5 g Intravenous PRN Nagi Aguilar MD        glucagon (rDNA) injection 1 mg  1 mg Intramuscular PRN Nagi Aguilar MD        dextrose 5 % solution  100 mL/hr Intravenous PRN Nagi Aguilar MD        insulin lispro (HUMALOG) injection vial 0-12 Units  0-12 Units Subcutaneous TID WC Nagi Aguilar MD   8 Units at 12/10/17 0732    insulin lispro (HUMALOG) injection vial 0-6 Units  0-6 Units Subcutaneous Nightly Nagi Aguilar MD   3 Units at 17    niCARdipine (CARDENE) 25 mg in dextrose 5 % 250 mL infusion  5 mg/hr Intravenous Continuous Kimberly Barone MD   Stopped at 12/10/17 0302       Past Medical History:  Past Medical History:   Diagnosis Date    Arthritis     Chronic kidney disease, stage 5, kidney failure ([facility] [address]     DM (diabetes mellitus) type II controlled with renal manifestation ([facility] [address] 1993    Gastroparesis     GERD (gastroesophageal reflux disease)     Hypertension     Hypothyroid     Restless leg syndrome        Past Surgical History:  Past Surgical History:   Procedure Laterality Date    BREAST SURGERY      infected milk gland removed    CHOLECYSTECTOMY      COLONOSCOPY      ENDOMETRIAL ABLATION      GALLBLADDER SURGERY      ME COLONOSCOPY W/BIOPSY SINGLE/MULTIPLE N/A 10/20/2017    Dr Shine Lat prep-Tubular AP (-) dysplasia x 2--3 yr recall    ME EGD TRANSORAL BIOPSY SINGLE/MULTIPLE N/A 2016    Dr Partha Meeks EGD TRANSORAL BIOPSY SINGLE/MULTIPLE N/A 10/20/2017    Dr Orozco Squibb (-)    TUBAL LIGATION      VASCULAR SURGERY Left 2016    fistula by Dr Villalobos Back at [address]  10/02/2017    [facility]. Left upper fistulograms/venograms, balloon angioplasty cephalic vein arch [id number] conquest.       Family History  Family History   Problem Relation Age of Onset    Colon Cancer Mother       at 63    Lung Cancer Father       at 66    Stomach Cancer Sister     Breast Cancer Sister     Depression Daughter 25     committed suicide       Social History  Social History     Social History    Marital status:      Spouse name: N/A    Number of children: 2    Years of education: N/A     Occupational History    Not on file.      Social History Main Topics    Smoking status: Current Every Day Smoker     Packs/day: 0.50     Years: 25.00     Types: Cigarettes     Last attempt to quit: 2017    Smokeless tobacco: Never Used    Alcohol use No    Drug use: No    Sexual activity: Not on file     Other Topics Concern    Not on file     Social History Narrative    No narrative on file         Review of Systems:  History obtained from chart review and the patient  General ROS: No fever or chills  Respiratory ROS: No cough, shortness of breath, wheezing  Cardiovascular ROS: no chest pain or dyspnea on exertion  Gastrointestinal ROS: No abdominal pain or melena  Genito-Urinary ROS: No dysuria or hematuria  Musculoskeletal ROS: No joint pain or swelling         Objective:  Blood pressure (!) 190/66, pulse 61, temperature 98.4 °F (36.9 °C), temperature source Temporal, resp. rate 16, height 5' 8\" (1.727 m), weight 169 lb 14.4 oz (77.1 kg), SpO2 (!) 89 %. Intake/Output Summary (Last 24 hours) at 12/10/17 1155  Last data filed at 12/10/17 0850   Gross per 24 hour   Intake           2774.6 ml   Output               70 ml   Net           2704.6 ml     General: awake/alert   Chest:  clear to auscultation bilaterally without respiratory distress  CVS: regular rate and rhythm  Abdominal: soft, nontender, normal bowel sounds  Extremities: no cyanosis or edema  Skin: warm and dry without rash    Labs:  BMP: Recent Labs      12/09/17   1115  12/09/17   1905  12/10/17   0124   NA  129*  134*  132*   K  3.7  4.1  4.3   CL  87*  91*  93*   CO2  24  24  23   BUN  67*  25*  26*   CREATININE  8.3*  4.9*  5.4*   CALCIUM  8.6  8.4*  8.3*     CBC: Recent Labs      12/08/17   2214  12/10/17   0124   WBC  7.6  8.6   HGB  11.1*  9.1*   HCT  32.7*  27.3*   MCV  92.4  94.5   PLT  256  178     LIVER PROFILE:   Recent Labs      12/08/17   2214  12/10/17   0124   AST  12  13   ALT  10  7   BILITOT  0.3  <0.2   ALKPHOS  108*  75     PT/INR: No results for input(s): PROTIME, INR in the last 72 hours. APTT: No results for input(s): APTT in the last 72 hours. BNP:  No results for input(s): BNP in the last 72 hours. Ionized Calcium:No results for input(s): IONCA in the last 72 hours.   Magnesium:  Recent Labs 12/08/17   2214   MG  2.5     Phosphorus:No results for input(s): PHOS in the last 72 hours. HgbA1C:   Recent Labs      12/09/17   0336   LABA1C  9.9*     Hepatic: Recent Labs      12/08/17   2214  12/10/17   0124   ALKPHOS  108*  75   ALT  10  7   AST  12  13   PROT  7.0  5.5*   BILITOT  0.3  <0.2   LABALBU  3.7  2.5*     Lactic Acid:   Recent Labs      12/09/17   0742   LACTA  2.3*     Troponin: No results for input(s): CKTOTAL, CKMB, TROPONINT in the last 72 hours. ABGs: Lab Results   Component Value Date    PHART 7.270 12/08/2017    PO2ART 64.0 12/08/2017    SNT4AGQ 41.0 12/08/2017     CRP:  No results for input(s): CRP in the last 72 hours. Sed Rate:  No results for input(s): SEDRATE in the last 72 hours. Culture Results:   Blood Culture Recent: No results for input(s): BC in the last 720 hours. Urine Culture Recent : Recent Labs      12/08/17   2350   LABURIN  No growth       Radiology reports as per the Radiologist  Radiology: Ct Head Wo Contrast    Result Date: 12/9/2017  CT HEAD WO CONTRAST 12/8/2017 10:00 PM History: Decreased alertness Comparisons: 11/26/2017 Technique: Radiation dose equals DLP 1514 mGy cm. AUTOMATED EXPOSURE CONTROL dose reduction technique was implemented CT evaluation of the head without intravenous contrast. 5 mm transaxial images were obtained. 2-D sagittal and coronal reconstruction images were generated. Findings: Examination was sent to statrad for preliminary interpretation There is no intra or extra-axial hemorrhage. There is no mass effect or midline shift. There is no hydrocephalus. There is mild central and cortical atrophy. Mild decreased attenuation in the periventricular white matter particularly in the frontal horn regions question suggesting mild chronic ischemic changes. Bone window imaging reveals no calvarial abnormality. Paranasal sinuses imaged part are grossly clear. CT appearance of the head is unchanged. Impression: 1.  No CT evidence of an acute intracranial process. Signed by Dr Diana Mejia on 12/9/2017 8:21 AM    Ct Head Wo Contrast    Result Date: 11/26/2017  CT BRAIN without contrast dated 11/26/2017 11:15 AM HISTORY: Hypertensive crisis COMPARISON: November 4, 2017 DOSE LENGTH PRODUCT: 1023 mGy cm TECHNIQUE: Serial axial tomographic images of the brain were obtained without the use of intravenous contrast. FINDINGS: The midline structures are nondisplaced. The ventricles and basilar cisterns are normal in size and configuration. There is no evidence of intracranial hemorrhage or mass-effect. The gray-white matter differentiation is maintained. The sulci have a normal configuration, and there are no abnormal extra-axial fluid collections. The structures of the posterior fossa are unremarkable. The included orbits and their contents are unremarkable. The visualized paranasal sinuses, mastoid air cells and middle ear cavities are clear. The visualized osseous structures and overlying soft tissues of the skull and face are unremarkable. 1. No acute intracranial process. Signed by Dr Aniya Basilio on 11/26/2017 1:31 PM    Xr Chest Portable    Result Date: 11/26/2017  XR CHEST PORTABLE 11/26/2017 11:15 AM HISTORY: Hypertensive crisis COMPARISON: November 4, 2017. FINDINGS: The lungs are clear. Cardiac silhouettes upper limits of normal to mildly enlarged. The osseous structures and surrounding soft tissues demonstrate no acute abnormality. 1. Mild cardiomegaly no evidence of pulmonary vascular congestion. Signed by Dr Aniya Basilio on 11/26/2017 12:39 PM       Assessment   ESRD  DM2 nephropathy on Insulin, uncontrolled  Anemia CKD  HTN, accelerated     Plan:  Try to keeep off cardene drip  HD 12/9 then MWF  restart clonidine  D/w RN.     Rohit Frausto MD  12/10/17  11:55 AM

## 2017-12-10 NOTE — PROGRESS NOTES
deficiency 05/23/2017    Hypoglycemia 05/22/2017    Chronic kidney disease, stage 5, kidney failure (HCC)     Gastroparesis     GERD (gastroesophageal reflux disease)     Hypertensive emergency     Hypothyroid     Non-intractable vomiting with nausea 11/04/2016    Chronic constipation 11/04/2016    DM (diabetes mellitus) type II controlled with renal manifestation ([facility] [address] 06/01/1993       Assessment/plan: Active Problems:    Gastroparesis    Hypothyroid    Anemia in chronic kidney disease (CKD)    DKA, type 2, not at goal [facility])    Hyperosmolarity due to secondary diabetes ([facility] [address]    Hypertensive urgency, malignant    Acute encephalopathy    Chronic renal failure, stage 5 ([facility] [address]    Noncompliance of patient with renal dialysis ([facility] [address]      Plan:     1. Continue care, see above and orders. 2. Resuming oral BP medications. She is tolerating intake, does suffer from severe gastroparesis, Reglan continues to help. 3. Nephrology input noted, continue HD  4. BS out of critical range. Resumed Lantus and sliding scale. 5. Prognosis fair  6. Disposition to medical floor with remote telemetry. Discharge disposition home when BP/BS controlled.       Sammi Shahid MD  Hospitalist Service  12/10/2017  1:15 PM

## 2017-12-11 LAB
GLUCOSE BLD-MCNC: 118 MG/DL (ref 70–99)
GLUCOSE BLD-MCNC: 203 MG/DL (ref 70–99)
GLUCOSE BLD-MCNC: 250 MG/DL (ref 70–99)
GLUCOSE BLD-MCNC: 292 MG/DL (ref 70–99)
GLUCOSE BLD-MCNC: 40 MG/DL (ref 70–99)
GLUCOSE BLD-MCNC: 45 MG/DL (ref 70–99)
HAV IGM SER IA-ACNC: NORMAL
HBV SURFACE AB TITR SER: NORMAL {TITER}
HEPATITIS B CORE IGM ANTIBODY: NORMAL
HEPATITIS B SURFACE ANTIGEN INTERPRETATION: NORMAL
HEPATITIS C ANTIBODY INTERPRETATION: NORMAL
PERFORMED ON: ABNORMAL
URINE CULTURE, ROUTINE: NORMAL

## 2017-12-11 PROCEDURE — 6370000000 HC RX 637 (ALT 250 FOR IP): Performed by: FAMILY MEDICINE

## 2017-12-11 PROCEDURE — 2580000003 HC RX 258: Performed by: FAMILY MEDICINE

## 2017-12-11 PROCEDURE — 2580000003 HC RX 258: Performed by: INTERNAL MEDICINE

## 2017-12-11 PROCEDURE — 82948 REAGENT STRIP/BLOOD GLUCOSE: CPT

## 2017-12-11 PROCEDURE — 6370000000 HC RX 637 (ALT 250 FOR IP): Performed by: INTERNAL MEDICINE

## 2017-12-11 PROCEDURE — 6360000002 HC RX W HCPCS: Performed by: INTERNAL MEDICINE

## 2017-12-11 PROCEDURE — 1210000000 HC MED SURG R&B

## 2017-12-11 PROCEDURE — 99232 SBSQ HOSP IP/OBS MODERATE 35: CPT | Performed by: FAMILY MEDICINE

## 2017-12-11 RX ADMIN — ROPINIROLE HYDROCHLORIDE 2 MG: 1 TABLET, FILM COATED ORAL at 20:11

## 2017-12-11 RX ADMIN — METOCLOPRAMIDE 5 MG: 10 TABLET ORAL at 20:12

## 2017-12-11 RX ADMIN — Medication 10 ML: at 07:45

## 2017-12-11 RX ADMIN — AMLODIPINE BESYLATE 5 MG: 5 TABLET ORAL at 10:52

## 2017-12-11 RX ADMIN — HYDRALAZINE HYDROCHLORIDE 100 MG: 50 TABLET, FILM COATED ORAL at 20:11

## 2017-12-11 RX ADMIN — METOPROLOL TARTRATE 100 MG: 50 TABLET ORAL at 20:11

## 2017-12-11 RX ADMIN — METOCLOPRAMIDE 5 MG: 10 TABLET ORAL at 06:39

## 2017-12-11 RX ADMIN — DOCUSATE SODIUM 300 MG: 100 CAPSULE, LIQUID FILLED ORAL at 20:11

## 2017-12-11 RX ADMIN — LEVOTHYROXINE SODIUM 150 MCG: 75 TABLET ORAL at 06:39

## 2017-12-11 RX ADMIN — CLONIDINE HYDROCHLORIDE 0.2 MG: 0.2 TABLET ORAL at 20:12

## 2017-12-11 RX ADMIN — HYDRALAZINE HYDROCHLORIDE 100 MG: 50 TABLET, FILM COATED ORAL at 14:07

## 2017-12-11 RX ADMIN — NIFEDIPINE 60 MG: 60 TABLET, FILM COATED, EXTENDED RELEASE ORAL at 10:52

## 2017-12-11 RX ADMIN — METOCLOPRAMIDE 5 MG: 10 TABLET ORAL at 14:07

## 2017-12-11 RX ADMIN — INSULIN GLARGINE 10 UNITS: 100 INJECTION, SOLUTION SUBCUTANEOUS at 20:16

## 2017-12-11 RX ADMIN — SIMVASTATIN 40 MG: 40 TABLET, FILM COATED ORAL at 20:12

## 2017-12-11 RX ADMIN — HEPARIN SODIUM 5000 UNITS: 5000 INJECTION INTRAVENOUS; SUBCUTANEOUS at 20:17

## 2017-12-11 RX ADMIN — DEXTROSE MONOHYDRATE 12.5 G: 25 INJECTION, SOLUTION INTRAVENOUS at 07:58

## 2017-12-11 RX ADMIN — VALSARTAN 320 MG: 160 TABLET ORAL at 10:52

## 2017-12-11 RX ADMIN — HEPARIN SODIUM 5000 UNITS: 5000 INJECTION INTRAVENOUS; SUBCUTANEOUS at 14:07

## 2017-12-11 RX ADMIN — METOCLOPRAMIDE 5 MG: 10 TABLET ORAL at 18:54

## 2017-12-11 RX ADMIN — HEPARIN SODIUM 5000 UNITS: 5000 INJECTION INTRAVENOUS; SUBCUTANEOUS at 06:44

## 2017-12-11 RX ADMIN — METOPROLOL TARTRATE 100 MG: 50 TABLET ORAL at 10:52

## 2017-12-11 RX ADMIN — Medication 10 ML: at 20:16

## 2017-12-11 RX ADMIN — INSULIN LISPRO 3 UNITS: 100 INJECTION, SOLUTION INTRAVENOUS; SUBCUTANEOUS at 20:16

## 2017-12-11 RX ADMIN — CLONIDINE HYDROCHLORIDE 0.2 MG: 0.2 TABLET ORAL at 14:07

## 2017-12-11 RX ADMIN — ISOSORBIDE MONONITRATE 120 MG: 60 TABLET, EXTENDED RELEASE ORAL at 10:51

## 2017-12-11 RX ADMIN — DULOXETINE HYDROCHLORIDE 60 MG: 60 CAPSULE, DELAYED RELEASE ORAL at 10:51

## 2017-12-11 RX ADMIN — CLONIDINE HYDROCHLORIDE 0.2 MG: 0.2 TABLET ORAL at 10:51

## 2017-12-11 ASSESSMENT — PAIN DESCRIPTION - PAIN TYPE
TYPE: ACUTE PAIN
TYPE: ACUTE PAIN

## 2017-12-11 ASSESSMENT — PAIN DESCRIPTION - LOCATION: LOCATION: HEAD

## 2017-12-11 ASSESSMENT — PAIN SCALES - GENERAL
PAINLEVEL_OUTOF10: 0
PAINLEVEL_OUTOF10: 0

## 2017-12-11 ASSESSMENT — PAIN DESCRIPTION - DESCRIPTORS: DESCRIPTORS: ACHING

## 2017-12-11 ASSESSMENT — PAIN DESCRIPTION - FREQUENCY: FREQUENCY: INTERMITTENT

## 2017-12-11 NOTE — PROGRESS NOTES
Pt's blood glucose 40. Juice was given. Rechecked to be 45. D50 was pushed by Kristel Young RN. Rechecked to be 118. Pt remained awake, alert and oriented.

## 2017-12-11 NOTE — CARE COORDINATION
SW attempted to visit with Pt; sleeping soundly with lights off.     Electronically signed by RUBEN Law on 12/11/2017 at 5:10 PM

## 2017-12-11 NOTE — PROGRESS NOTES
Nephrology ([facility]) progress Note      Patient:  Rachael Lyman  YOB: 1970  Date of Service: 12/11/2017  MRN: [id number]   Acct: [de-identified]   Primary Care Physician: ROMINA Rowe  Advance Directive: Full Code  Admit Date: 12/8/2017       Hospital Day: 2  Referring Provider: Joselyn Beckman MD    Patient independently seen and examined, Chart, Consults, Notes, Operative notes, Labs, Cardiology, and Radiology studies reviewed as able. Subjective:  Rachael Lyman is a 49 y.o. female  whom we were consulted for end-stage renal disease. Patient has end-stage renal disease secondary to diabetic nephropathy. Patient was presenting to the hospital with severe hypoglycemia and altered mental status. She was treated with intravenous glucose. This morning she is looking more alert and awake. She has no major complaint at this time.     Allergies:  Lamisil advanced [terbinafine hcl] and Penicillins    Medicines:  Current Facility-Administered Medications   Medication Dose Route Frequency Provider Last Rate Last Dose    cloNIDine (CATAPRES) tablet 0.2 mg  0.2 mg Oral TID Petey Millan MD   0.2 mg at 12/10/17 2218    insulin glargine (LANTUS) injection vial 10 Units  10 Units Subcutaneous Nightly Aileen Mobley MD   10 Units at 12/10/17 2219    docusate sodium (COLACE) capsule 300 mg  300 mg Oral Nightly Aileen Mobley MD   300 mg at 12/10/17 2217    hydrALAZINE (APRESOLINE) injection 10 mg  10 mg Intravenous Q6H PRN Aileen Mobley MD        sodium chloride flush 0.9 % injection 10 mL  10 mL Intravenous 2 times per day Brissa Souza MD   10 mL at 12/10/17 0824    sodium chloride flush 0.9 % injection 10 mL  10 mL Intravenous PRN Brissa Souza MD        acetaminophen (TYLENOL) tablet 650 mg  650 mg Oral Q4H PRN Brissa Souza MD   650 mg at 12/09/17 2323    bisacodyl (DULCOLAX) suppository 10 mg  10 mg Rectal Daily PRN Brissa Souza MD  ondansetron (ZOFRAN) injection 4 mg  4 mg Intravenous Q6H PRN Kathey Boeck, MD   4 mg at 12/09/17 1957    heparin (porcine) injection 5,000 Units  5,000 Units Subcutaneous 3 times per day Kathey Boeck, MD   5,000 Units at 12/11/17 0644    DULoxetine (CYMBALTA) extended release capsule 60 mg  60 mg Oral Daily Edilma Emmanuel MD   60 mg at 12/10/17 6170    hydrALAZINE (APRESOLINE) tablet 100 mg  100 mg Oral TID Edilma Emmanuel MD   100 mg at 12/10/17 2217    levothyroxine (SYNTHROID) tablet 150 mcg  150 mcg Oral Daily Edilma Emmanuel MD   150 mcg at 12/11/17 9235    metoclopramide (REGLAN) tablet 5 mg  5 mg Oral 4x Daily AC & HS Edilma Emmanuel MD   5 mg at 12/11/17 4828    rOPINIRole (REQUIP) tablet 2 mg  2 mg Oral Nightly Edilma Emmanuel MD   2 mg at 12/10/17 2217    amLODIPine (NORVASC) tablet 5 mg  5 mg Oral Daily Edilma Emmanuel MD   5 mg at 12/10/17 7759    isosorbide mononitrate (IMDUR) extended release tablet 120 mg  120 mg Oral Daily Edilma Emmanuel MD   120 mg at 12/10/17 0820    metoprolol tartrate (LOPRESSOR) tablet 100 mg  100 mg Oral BID Edilma Emmanuel MD   100 mg at 12/10/17 2218    NIFEdipine (PROCARDIA XL) extended release tablet 60 mg  60 mg Oral Daily Edilma Emmanuel MD   60 mg at 12/10/17 0821    ondansetron (ZOFRAN) tablet 4 mg  4 mg Oral Q8H PRN Edilma Emmanuel MD        valsartan (DIOVAN) tablet 320 mg  320 mg Oral Daily Edilma Emmanuel MD   320 mg at 12/10/17 0820    simvastatin (ZOCOR) tablet 40 mg  40 mg Oral Nightly Edilma Emmanuel MD   40 mg at 12/10/17 2218    glucose (GLUTOSE) 40 % oral gel 15 g  15 g Oral PRN Edilma Emmanuel MD        dextrose 50 % solution 12.5 g  12.5 g Intravenous PRN Edilma Emmanuel MD   12.5 g at 12/11/17 0758    glucagon (rDNA) injection 1 mg  1 mg Intramuscular PRN Edilma Emmanuel MD        dextrose 5 % solution  100 mL/hr Intravenous PRN Edilma Emmanuel MD        insulin lispro (HUMALOG) injection vial 0-12 Units  0-12 Units Subcutaneous TID WC Bobby Cantor MD   4 Units at 12/10/17 1239    insulin lispro (HUMALOG) injection vial 0-6 Units  0-6 Units Subcutaneous Nightly Bobby Cantor MD   3 Units at 17       Past Medical History:  Past Medical History:   Diagnosis Date    Arthritis     Chronic kidney disease, stage 5, kidney failure ([facility] [address]     DM (diabetes mellitus) type II controlled with renal manifestation ([facility] [address] 1993    Gastroparesis     GERD (gastroesophageal reflux disease)     Hypertension     Hypothyroid     Restless leg syndrome        Past Surgical History:  Past Surgical History:   Procedure Laterality Date    BREAST SURGERY      infected milk gland removed    CHOLECYSTECTOMY      COLONOSCOPY      ENDOMETRIAL ABLATION      GALLBLADDER SURGERY      [place] COLONOSCOPY W/BIOPSY SINGLE/MULTIPLE N/A 10/20/2017    Dr Hari Wynn prep-Tubular AP (-) dysplasia x 2--3 yr recall    [place] EGD TRANSORAL BIOPSY SINGLE/MULTIPLE N/A 2016    Dr Lilian Clark EGD TRANSORAL BIOPSY SINGLE/MULTIPLE N/A 10/20/2017    Dr Aide Higgins (-)    TUBAL LIGATION      VASCULAR SURGERY Left 2016    fistula by Dr Haze Duane at [address]  10/02/2017    [facility]. Left upper fistulograms/venograms, balloon angioplasty cephalic vein arch [id number] conquest.       Family History  Family History   Problem Relation Age of Onset    Colon Cancer Mother       at 63    Lung Cancer Father       at 66    Stomach Cancer Sister     Breast Cancer Sister     Depression Daughter 25     committed suicide       Social History  Social History     Social History    Marital status:      Spouse name: N/A    Number of children: 2    Years of education: N/A     Occupational History    Not on file.      Social History Main Topics    Smoking status: Current Every Day Smoker     Packs/day: 0.50     Years: 25.00     Types: Cigarettes     Last attempt to quit: 2017    Smokeless tobacco: Never Used    Alcohol use No    Drug use: No    Sexual activity: Not on file     Other Topics Concern    Not on file     Social History Narrative    No narrative on file         Review of Systems:  History obtained from chart review and the patient  General ROS: No fever or chills  Respiratory ROS: No cough, shortness of breath, wheezing  Cardiovascular ROS: no chest pain or dyspnea on exertion  Gastrointestinal ROS: No abdominal pain or melena  Genito-Urinary ROS: No dysuria or hematuria  Musculoskeletal ROS: No joint pain or swelling   14 point ROS reviewed with the patient and negative except as noted above and in the HPI unless unable to obtain. Objective:  Blood pressure 136/70, pulse 60, temperature 98.5 °F (36.9 °C), temperature source Temporal, resp. rate 16, height 5' 8\" (1.727 m), weight 169 lb 13.9 oz (77.1 kg), SpO2 93 %. Intake/Output Summary (Last 24 hours) at 12/11/17 0913  Last data filed at 12/10/17 1300   Gross per 24 hour   Intake              225 ml   Output                0 ml   Net              225 ml     General: awake/alert    HEENT: Normocephalic atraumatic head  Neck: Supple with no JVD or carotid bruits.   Chest:  clear to auscultation bilaterally without respiratory distress  CVS: regular rate and rhythm  Abdominal: soft, nontender, normal bowel sounds  Extremities: no cyanosis or edema  Skin: warm and dry without rash      Labs:  BMP: Recent Labs      12/09/17   1115  12/09/17   1905  12/10/17   0124   NA  129*  134*  132*   K  3.7  4.1  4.3   CL  87*  91*  93*   CO2  24  24  23   BUN  67*  25*  26*   CREATININE  8.3*  4.9*  5.4*   CALCIUM  8.6  8.4*  8.3*     CBC: Recent Labs      12/08/17   2214  12/10/17   0124   WBC  7.6  8.6   HGB  11.1*  9.1*   HCT  32.7*  27.3*   MCV  92.4  94.5   PLT  256  178     LIVER PROFILE:   Recent Labs      12/08/17   2214  12/10/17   0124   AST  12  13   ALT  10  7   BILITOT  0.3  <0.2   ALKPHOS  108*  75     PT/INR: No results for input(s): PROTIME, INR in the last 72 hours. APTT: No results for input(s): APTT in the last 72 hours. BNP:  No results for input(s): BNP in the last 72 hours. Ionized Calcium:No results for input(s): IONCA in the last 72 hours. Magnesium:  Recent Labs      12/08/17   2214   MG  2.5     Phosphorus:No results for input(s): PHOS in the last 72 hours. HgbA1C:   Recent Labs      12/09/17   0336   LABA1C  9.9*     Hepatic: Recent Labs      12/08/17   2214  12/10/17   0124   ALKPHOS  108*  75   ALT  10  7   AST  12  13   PROT  7.0  5.5*   BILITOT  0.3  <0.2   LABALBU  3.7  2.5*     Lactic Acid:   Recent Labs      12/09/17   0742   LACTA  2.3*     Troponin: No results for input(s): CKTOTAL, CKMB, TROPONINT in the last 72 hours. ABGs: No results for input(s): PH, PCO2, PO2, HCO3, O2SAT in the last 72 hours. CRP:  No results for input(s): CRP in the last 72 hours. Sed Rate:  No results for input(s): SEDRATE in the last 72 hours. Cultures:   No results for input(s): CULTURE in the last 72 hours. Recent Labs      12/09/17   0742  12/09/17   1239   BC   --   No Growth to date. Any change in status will be called. BLOODCULT2  No Growth to date. Any change in status will be called. --      No results for input(s): CXSURG in the last 72 hours. Radiology reports as per the Radiologist  Radiology: Ct Head Wo Contrast    Result Date: 12/9/2017  CT HEAD WO CONTRAST 12/8/2017 10:00 PM History: Decreased alertness Comparisons: 11/26/2017 Technique: Radiation dose equals DLP 1514 mGy cm. AUTOMATED EXPOSURE CONTROL dose reduction technique was implemented CT evaluation of the head without intravenous contrast. 5 mm transaxial images were obtained. 2-D sagittal and coronal reconstruction images were generated. Findings: Examination was sent to statrad for preliminary interpretation There is no intra or extra-axial hemorrhage. There is no mass effect or midline shift. There is no hydrocephalus.  There is mild central and cortical atrophy. Mild decreased attenuation in the periventricular white matter particularly in the frontal horn regions question suggesting mild chronic ischemic changes. Bone window imaging reveals no calvarial abnormality. Paranasal sinuses imaged part are grossly clear. CT appearance of the head is unchanged. Impression: 1. No CT evidence of an acute intracranial process. Signed by Dr Jose Alberto Gong on 12/9/2017 8:21 AM       Assessment   1. End-stage renal disease on dialysis Tuesday Thursday and Saturday. 2. Diabetic nephropathy. 3. Insulin-dependent type II diabetes. 4. Anemia of chronic kidney disease. 5. Secondary hyperparathyroidism. 6. Hypoglycemia with altered mental status, resolving    Plan:  1. Resume hemodialysis treatment in the morning   2. Agree with decreasing insulin dose.       Ashtyn Brady MD  12/11/17  9:13 AM

## 2017-12-12 VITALS
RESPIRATION RATE: 16 BRPM | WEIGHT: 174 LBS | TEMPERATURE: 97.6 F | BODY MASS INDEX: 26.37 KG/M2 | SYSTOLIC BLOOD PRESSURE: 184 MMHG | OXYGEN SATURATION: 95 % | HEART RATE: 68 BPM | HEIGHT: 68 IN | DIASTOLIC BLOOD PRESSURE: 72 MMHG

## 2017-12-12 PROBLEM — Z99.2 TYPE 2 DIABETES MELLITUS WITH CHRONIC KIDNEY DISEASE ON CHRONIC DIALYSIS, WITH LONG-TERM CURRENT USE OF INSULIN (HCC): Chronic | Status: ACTIVE | Noted: 2017-12-09

## 2017-12-12 PROBLEM — E87.5 HYPERKALEMIA: Status: RESOLVED | Noted: 2017-05-23 | Resolved: 2017-12-12

## 2017-12-12 PROBLEM — N18.6 TYPE 2 DIABETES MELLITUS WITH CHRONIC KIDNEY DISEASE ON CHRONIC DIALYSIS, WITH LONG-TERM CURRENT USE OF INSULIN (HCC): Chronic | Status: ACTIVE | Noted: 2017-12-09

## 2017-12-12 PROBLEM — N18.9 ANEMIA IN CHRONIC KIDNEY DISEASE (CKD): Chronic | Status: ACTIVE | Noted: 2017-05-23

## 2017-12-12 PROBLEM — D63.1 ANEMIA IN CHRONIC KIDNEY DISEASE (CKD): Chronic | Status: ACTIVE | Noted: 2017-05-23

## 2017-12-12 PROBLEM — E16.2 HYPOGLYCEMIA: Status: RESOLVED | Noted: 2017-05-22 | Resolved: 2017-12-12

## 2017-12-12 PROBLEM — E13.00 HYPEROSMOLARITY DUE TO SECONDARY DIABETES (HCC): Status: RESOLVED | Noted: 2017-12-09 | Resolved: 2017-12-12

## 2017-12-12 PROBLEM — Z79.4 TYPE 2 DIABETES MELLITUS WITH CHRONIC KIDNEY DISEASE ON CHRONIC DIALYSIS, WITH LONG-TERM CURRENT USE OF INSULIN (HCC): Chronic | Status: ACTIVE | Noted: 2017-12-09

## 2017-12-12 PROBLEM — E11.22 TYPE 2 DIABETES MELLITUS WITH CHRONIC KIDNEY DISEASE ON CHRONIC DIALYSIS, WITH LONG-TERM CURRENT USE OF INSULIN (HCC): Chronic | Status: ACTIVE | Noted: 2017-12-09

## 2017-12-12 LAB
ALBUMIN SERPL-MCNC: 2.8 G/DL (ref 3.5–5.2)
ALP BLD-CCNC: 72 U/L (ref 35–104)
ALT SERPL-CCNC: 6 U/L (ref 5–33)
ANION GAP SERPL CALCULATED.3IONS-SCNC: 16 MMOL/L (ref 7–19)
AST SERPL-CCNC: 11 U/L (ref 5–32)
BILIRUB SERPL-MCNC: <0.2 MG/DL (ref 0.2–1.2)
BUN BLDV-MCNC: 46 MG/DL (ref 6–20)
CALCIUM SERPL-MCNC: 8 MG/DL (ref 8.6–10)
CHLORIDE BLD-SCNC: 94 MMOL/L (ref 98–111)
CO2: 24 MMOL/L (ref 22–29)
CREAT SERPL-MCNC: 8.9 MG/DL (ref 0.5–0.9)
GFR NON-AFRICAN AMERICAN: 5
GLUCOSE BLD-MCNC: 145 MG/DL (ref 70–99)
GLUCOSE BLD-MCNC: 145 MG/DL (ref 74–109)
GLUCOSE BLD-MCNC: 211 MG/DL (ref 70–99)
GLUCOSE BLD-MCNC: 89 MG/DL (ref 70–99)
HCT VFR BLD CALC: 26.3 % (ref 37–47)
HEMOGLOBIN: 8.8 G/DL (ref 12–16)
MCH RBC QN AUTO: 31.7 PG (ref 27–31)
MCHC RBC AUTO-ENTMCNC: 33.5 G/DL (ref 33–37)
MCV RBC AUTO: 94.6 FL (ref 81–99)
PDW BLD-RTO: 14.1 % (ref 11.5–14.5)
PERFORMED ON: ABNORMAL
PERFORMED ON: ABNORMAL
PERFORMED ON: NORMAL
PLATELET # BLD: 156 K/UL (ref 130–400)
PMV BLD AUTO: 10 FL (ref 9.4–12.3)
POTASSIUM SERPL-SCNC: 5.9 MMOL/L (ref 3.5–5)
RBC # BLD: 2.78 M/UL (ref 4.2–5.4)
SODIUM BLD-SCNC: 134 MMOL/L (ref 136–145)
TOTAL PROTEIN: 5 G/DL (ref 6.6–8.7)
WBC # BLD: 6.4 K/UL (ref 4.8–10.8)

## 2017-12-12 PROCEDURE — 8010000000 HC HEMODIALYSIS ACUTE INPT

## 2017-12-12 PROCEDURE — 6370000000 HC RX 637 (ALT 250 FOR IP): Performed by: INTERNAL MEDICINE

## 2017-12-12 PROCEDURE — 82948 REAGENT STRIP/BLOOD GLUCOSE: CPT

## 2017-12-12 PROCEDURE — 6360000002 HC RX W HCPCS: Performed by: INTERNAL MEDICINE

## 2017-12-12 PROCEDURE — 85027 COMPLETE CBC AUTOMATED: CPT

## 2017-12-12 PROCEDURE — 6370000000 HC RX 637 (ALT 250 FOR IP): Performed by: FAMILY MEDICINE

## 2017-12-12 PROCEDURE — 99239 HOSP IP/OBS DSCHRG MGMT >30: CPT | Performed by: FAMILY MEDICINE

## 2017-12-12 PROCEDURE — 6360000002 HC RX W HCPCS: Performed by: FAMILY MEDICINE

## 2017-12-12 PROCEDURE — 36415 COLL VENOUS BLD VENIPUNCTURE: CPT

## 2017-12-12 PROCEDURE — 80053 COMPREHEN METABOLIC PANEL: CPT

## 2017-12-12 RX ADMIN — METOCLOPRAMIDE 5 MG: 10 TABLET ORAL at 06:02

## 2017-12-12 RX ADMIN — ISOSORBIDE MONONITRATE 120 MG: 60 TABLET, EXTENDED RELEASE ORAL at 13:28

## 2017-12-12 RX ADMIN — AMLODIPINE BESYLATE 5 MG: 5 TABLET ORAL at 13:29

## 2017-12-12 RX ADMIN — VALSARTAN 320 MG: 160 TABLET ORAL at 13:34

## 2017-12-12 RX ADMIN — LEVOTHYROXINE SODIUM 150 MCG: 75 TABLET ORAL at 06:02

## 2017-12-12 RX ADMIN — METOPROLOL TARTRATE 100 MG: 50 TABLET ORAL at 13:28

## 2017-12-12 RX ADMIN — CLONIDINE HYDROCHLORIDE 0.2 MG: 0.2 TABLET ORAL at 13:27

## 2017-12-12 RX ADMIN — HYDRALAZINE HYDROCHLORIDE 100 MG: 50 TABLET, FILM COATED ORAL at 13:28

## 2017-12-12 RX ADMIN — METOCLOPRAMIDE 5 MG: 10 TABLET ORAL at 13:29

## 2017-12-12 RX ADMIN — DULOXETINE HYDROCHLORIDE 60 MG: 60 CAPSULE, DELAYED RELEASE ORAL at 13:28

## 2017-12-12 RX ADMIN — HEPARIN SODIUM 5000 UNITS: 5000 INJECTION INTRAVENOUS; SUBCUTANEOUS at 06:02

## 2017-12-12 RX ADMIN — HEPARIN SODIUM 5000 UNITS: 5000 INJECTION INTRAVENOUS; SUBCUTANEOUS at 13:30

## 2017-12-12 RX ADMIN — NIFEDIPINE 60 MG: 60 TABLET, FILM COATED, EXTENDED RELEASE ORAL at 13:28

## 2017-12-12 RX ADMIN — HYDRALAZINE HYDROCHLORIDE 10 MG: 20 INJECTION INTRAMUSCULAR; INTRAVENOUS at 17:56

## 2017-12-12 ASSESSMENT — PAIN SCALES - GENERAL: PAINLEVEL_OUTOF10: 0

## 2017-12-12 NOTE — PROGRESS NOTES
Nephrology ([facility]) Progress Note      Patient:  Agatha Millan  YOB: 1970  Date of Service: 12/12/2017  MRN: [id number]   Acct: [de-identified]   Primary Care Physician: ROMINA Tay  Advance Directive: Full Code  Admit Date: 12/8/2017       Hospital Day: 3  Referring Provider: Tio Medina MD    Patient independently seen and examined, Chart, Consults, Notes, Operative notes, Labs, Cardiology, and Radiology studies reviewed as able. Subjective:  Agatha Millan is a 49 y.o. female  whom we were consulted for end-stage renal disease. Patient has end-stage renal disease secondary to diabetic nephropathy. She presented with severe hypoglycemia associated with mental status change. She was treated with dextrose 50%. She has significant improvement of serum glucose level. Currently seen on hemodialysis  Hemodialysis access: AV fistula  Hemodialysis: 4 hour  Ultrafiltration: 3000 mL  2K bath.     Allergies:  Lamisil advanced [terbinafine hcl] and Penicillins    Medicines:  Current Facility-Administered Medications   Medication Dose Route Frequency Provider Last Rate Last Dose    cloNIDine (CATAPRES) tablet 0.2 mg  0.2 mg Oral TID Emy Chamberlain MD   0.2 mg at 12/11/17 2012    insulin glargine (LANTUS) injection vial 10 Units  10 Units Subcutaneous Nightly Desiree Altamirano MD   10 Units at 12/11/17 2016    docusate sodium (COLACE) capsule 300 mg  300 mg Oral Nightly Desiree Altamirano MD   300 mg at 12/11/17 2011    hydrALAZINE (APRESOLINE) injection 10 mg  10 mg Intravenous Q6H PRN Desiree Alatmirano MD        sodium chloride flush 0.9 % injection 10 mL  10 mL Intravenous 2 times per day Mallory Girard MD   10 mL at 12/11/17 2016    sodium chloride flush 0.9 % injection 10 mL  10 mL Intravenous PRN Mallory Girard MD        acetaminophen (TYLENOL) tablet 650 mg  650 mg Oral Q4H PRN Mallory Girard MD   650 mg at 12/09/17 2323    bisacodyl 25.00     Types: Cigarettes     Last attempt to quit: 5/12/2017    Smokeless tobacco: Never Used    Alcohol use No    Drug use: No    Sexual activity: Not on file     Other Topics Concern    Not on file     Social History Narrative    No narrative on file         Review of Systems:  History obtained from chart review and the patient  General ROS: No fever or chills  Respiratory ROS: No cough, shortness of breath, wheezing  Cardiovascular ROS: no chest pain or dyspnea on exertion  Gastrointestinal ROS: No abdominal pain or melena  Genito-Urinary ROS: No dysuria or hematuria  Musculoskeletal ROS: No joint pain or swelling   14 point ROS reviewed with the patient and negative except as noted above and in the HPI unless unable to obtain. Objective:  Blood pressure (!) 153/76, pulse 60, temperature 97.2 °F (36.2 °C), resp. rate 18, height 5' 8\" (1.727 m), weight 174 lb (78.9 kg), SpO2 96 %.     Intake/Output Summary (Last 24 hours) at 12/12/17 0946  Last data filed at 12/11/17 1311   Gross per 24 hour   Intake              480 ml   Output                0 ml   Net              480 ml     General: awake/alert    HEENT: Normocephalic atraumatic head  Chest:  clear to auscultation bilaterally without respiratory distress  CVS: regular rate and rhythm  Abdominal: soft, nontender, normal bowel sounds  Extremities: no cyanosis or edema  Skin: warm and dry without rash      Labs:  BMP: Recent Labs      12/09/17   1905  12/10/17   0124  12/12/17   0426   NA  134*  132*  134*   K  4.1  4.3  5.9*   CL  91*  93*  94*   CO2  24  23  24   BUN  25*  26*  46*   CREATININE  4.9*  5.4*  8.9*   CALCIUM  8.4*  8.3*  8.0*     CBC: Recent Labs      12/10/17   0124 12/12/17 0426   WBC  8.6  6.4   HGB  9.1*  8.8*   HCT  27.3*  26.3*   MCV  94.5  94.6   PLT  178  156     LIVER PROFILE:   Recent Labs      12/10/17   0124  12/12/17   0426   AST  13  11   ALT  7  6   BILITOT  <0.2  <0.2   ALKPHOS  75  72     PT/INR: No results for particularly in the frontal horn regions question suggesting mild chronic ischemic changes. Bone window imaging reveals no calvarial abnormality. Paranasal sinuses imaged part are grossly clear. CT appearance of the head is unchanged. Impression: 1. No CT evidence of an acute intracranial process. Signed by Dr Tonio Anders on 12/9/2017 8:21 AM       Assessment   1. End-stage renal disease, currently seen on hemodialysis  2. Diabetic nephropathy  3. Hypoglycemia. 4. Type II insulin-dependent diabetes. 5. Anemia of chronic kidney disease. Plan:  1. Patient is tolerating hemodialysis very well. 2. Agree with decreasing doses of insulin. 3. Possible discharge to home today.         Eloisa Panchal MD  12/12/17  9:46 AM

## 2017-12-12 NOTE — DISCHARGE SUMMARY
Discharge Summary    Patient ID: Trent Gracia      Patient's PCP: ROMINA Polk    Admit Date: 12/8/2017     Discharge Date:   12/12/2017    Admitting Physician: Dylon To MD     Discharge Physician: Dr. Opal Morris    Consultants:   1. Dr. Frank Thomas, Nephrology    Discharge Diagnoses:    Principal Problem:    Hypertensive urgency, malignant - Resolved with Hemodialysis and Antihypertensive Medications     Active Problems:    Hyperosmolarity due to secondary diabetes ([address] - Resolved    Acute encephalopathy 2' to Uncontrolled DM - Resolved    Type 2 diabetes mellitus with chronic kidney disease on chronic dialysis, with long-term current use of insulin ([address] Uncontrolled with HgbA1C 9.9     ESRD on hemodialysis ([address] with Past History Noncompliance of patient with renal dialysis ([address] - Stressed Importance of Compliance    Gastroparesis 2' to Uncontrolled DM    Gastroesophageal reflux disease without esophagitis - PPI    Acquired hypothyroidism - On Replacement    Anemia in chronic kidney disease (CKD) - Hgb/Hct Stable         Hospital Course:   Ms. Rose Mary Montalvo is a 49 y. o. female who presented to the emergency room via EMS due to altered mental status and hyperglycemia family says she was confused almost all day she missed dialysis yesterday because she didn't feel good to go.     She was found to have significant elevation in blood pressure and significant elevation in blood sugar around 800 consistent with a hyperosmolar state the patient was admitted to the intensive care unit for further evaluation and workup, and treatment for blood pressure and blood sugar with cardene and insulin drip and fluids respectively.     Blood pressure improved and antihypertensive drips were discontinued. She has tolerated hemodialysis.  She was able to be transferred to the medical floor on 12/10/2017.     Extensive education and importance of strict blood sugar monitoring and diet control discussed and given

## 2017-12-12 NOTE — PROGRESS NOTES
Hospitalist Progress Note  12/11/2017 10:30 AM  Subjective:   Admit Date: 12/8/2017  PCP: ROMINA Logan    Chief Complaint: F/U Decreased LOC    Subjective: Resting. Awakened from sleep. Denies any complaints. Cumulative Hospital Course: The patient is a 49 y. o. female who presented to the emergency room via EMS due to altered mental status and hyperglycemia family says she was confused almost all day she missed dialysis yesterday because she didn't feel good to go. She was found to have significant elevation in blood pressure and significant elevation in blood sugar around 800 consistent with a hyperosmolar state the patient was admitted to the intensive care unit for further evaluation and workup, and treatment for blood pressure and blood sugar with cardene and insulin drip and fluids respectively.     Blood pressure improved and antihypertensive drips were discontinued. She has tolerated hemodialysis. She was able to be transferred to the medical floor on 12/10/2017. Extensive education and importance of strict blood sugar monitoring and diet control discussed and given to patient. She has receptive of information. Review of Systems:   Constitutional: Denies fever or chills. Weak. HEENT: Denies headaches or visual disturbances. Denies difficulty swallowing or sore throat. Respiratory: Denies cough or hoarseness. Denies shortness of breath. Cardiovascular: Denies chest pain or pressure. Denies palpitations. Denies presyncope/syncope. Denies orthopnea/PND. Denies lower extremity edema. Gastrointestinal: Denies abdominal pain. Denies nausea/vomiting. Denies change in bowel habits or history of recent GI tract blood loss. Genitourinary: Denies urinary urgency or frequency. Denies dysuria, hematuria. Musculoskeletal: Denies pain or swelling in joints. Neurological: Denies paresthesias. Denies headache. Denies seizure or stroke symptoms.   Behavioral/Psych: Denies problems with anxiety or depression. All other ROS negative except where stated above. DIET CARB CONTROL;     Intake/Output Summary (Last 24 hours) at 12/12/17 1126  Last data filed at 12/11/17 1311   Gross per 24 hour   Intake              480 ml   Output                0 ml   Net              480 ml     Medications:   dextrose       Current Facility-Administered Medications   Medication Dose Route Frequency Provider Last Rate Last Dose    cloNIDine (CATAPRES) tablet 0.2 mg  0.2 mg Oral TID Petey Millan MD   0.2 mg at 12/11/17 2012    insulin glargine (LANTUS) injection vial 10 Units  10 Units Subcutaneous Nightly Aileen Mobley MD   10 Units at 12/11/17 2016    docusate sodium (COLACE) capsule 300 mg  300 mg Oral Nightly Aileen Mobley MD   300 mg at 12/11/17 2011    hydrALAZINE (APRESOLINE) injection 10 mg  10 mg Intravenous Q6H PRN Aileen Mobley MD        sodium chloride flush 0.9 % injection 10 mL  10 mL Intravenous 2 times per day Brissa Souza MD   10 mL at 12/11/17 2016    sodium chloride flush 0.9 % injection 10 mL  10 mL Intravenous PRN Brissa Souza MD        acetaminophen (TYLENOL) tablet 650 mg  650 mg Oral Q4H PRN Brissa Souza MD   650 mg at 12/09/17 2323    bisacodyl (DULCOLAX) suppository 10 mg  10 mg Rectal Daily PRN Brissa Souza MD        ondansetron (ZOFRAN) injection 4 mg  4 mg Intravenous Q6H PRN Daniel Jacobson MD   4 mg at 12/09/17 1957    heparin (porcine) injection 5,000 Units  5,000 Units Subcutaneous 3 times per day Brissa Souza MD   5,000 Units at 12/12/17 0602    DULoxetine (CYMBALTA) extended release capsule 60 mg  60 mg Oral Daily Aileen Mobley MD   60 mg at 12/11/17 1051    hydrALAZINE (APRESOLINE) tablet 100 mg  100 mg Oral TID Aileen Mobley MD   100 mg at 12/11/17 2011    levothyroxine (SYNTHROID) tablet 150 mcg  150 mcg Oral Daily Aileen Mobley MD   150 mcg at 12/12/17 0602    metoclopramide (REGLAN) tablet 5 mg  5 mg Oral 4x Daily AC & HS Nagi Aguilar MD   5 mg at 12/12/17 0602    rOPINIRole (REQUIP) tablet 2 mg  2 mg Oral Nightly Nagi Aguilar MD   2 mg at 12/11/17 2011    amLODIPine (NORVASC) tablet 5 mg  5 mg Oral Daily Nagi Aguilar MD   5 mg at 12/11/17 1052    isosorbide mononitrate (IMDUR) extended release tablet 120 mg  120 mg Oral Daily Nagi Aguilar MD   120 mg at 12/11/17 1051    metoprolol tartrate (LOPRESSOR) tablet 100 mg  100 mg Oral BID Nagi Aguilar MD   100 mg at 12/11/17 2011    NIFEdipine (PROCARDIA XL) extended release tablet 60 mg  60 mg Oral Daily Nagi Aguilar MD   60 mg at 12/11/17 1052    ondansetron (ZOFRAN) tablet 4 mg  4 mg Oral Q8H PRN Nagi Aguilar MD        valsartan (DIOVAN) tablet 320 mg  320 mg Oral Daily Nagi Aguilar MD   320 mg at 12/11/17 1052    simvastatin (ZOCOR) tablet 40 mg  40 mg Oral Nightly Nagi Aguilar MD   40 mg at 12/11/17 2012    glucose (GLUTOSE) 40 % oral gel 15 g  15 g Oral PRN Nagi Aguilar MD        dextrose 50 % solution 12.5 g  12.5 g Intravenous PRN Nagi Aguilar MD   12.5 g at 12/11/17 0758    glucagon (rDNA) injection 1 mg  1 mg Intramuscular PRN Nagi Aguilar MD        dextrose 5 % solution  100 mL/hr Intravenous PRN Nagi Aguilar MD        insulin lispro (HUMALOG) injection vial 0-12 Units  0-12 Units Subcutaneous TID WC Nagi Aguilar MD   4 Units at 12/10/17 1239    insulin lispro (HUMALOG) injection vial 0-6 Units  0-6 Units Subcutaneous Nightly Nagi Aguilar MD   3 Units at 12/11/17 2016        Labs:     Recent Labs      12/10/17   0124  12/12/17   0426   WBC  8.6  6.4   RBC  2.89*  2.78*   HGB  9.1*  8.8*   HCT  27.3*  26.3*   MCV  94.5  94.6   MCH  31.5*  31.7*   MCHC  33.3  33.5   PLT  178  156     Recent Labs      12/09/17   1905  12/10/17   0124  12/12/17   0426   NA  134*  132*  134*   K  4.1  4.3  5.9*   ANIONGAP  19  16  16   CL  91*  93*  94*   CO2  24  23  24   BUN  25*  26*  46*

## 2017-12-13 LAB
EKG P AXIS: 70 DEGREES
EKG P-R INTERVAL: 164 MS
EKG Q-T INTERVAL: 408 MS
EKG QRS DURATION: 100 MS
EKG QTC CALCULATION (BAZETT): 439 MS
EKG T AXIS: 70 DEGREES

## 2017-12-14 LAB
BLOOD CULTURE, ROUTINE: NORMAL
CULTURE, BLOOD 2: NORMAL

## 2018-01-02 RX ORDER — METOCLOPRAMIDE 5 MG/1
5 TABLET ORAL
Qty: 120 TABLET | Refills: 0 | Status: SHIPPED | OUTPATIENT
Start: 2018-01-02 | End: 2018-02-06 | Stop reason: SDUPTHER

## 2018-02-06 ENCOUNTER — OFFICE VISIT (OUTPATIENT)
Dept: GASTROENTEROLOGY | Age: 48
End: 2018-02-06
Payer: MEDICARE

## 2018-02-06 VITALS
SYSTOLIC BLOOD PRESSURE: 138 MMHG | HEART RATE: 66 BPM | WEIGHT: 158 LBS | OXYGEN SATURATION: 97 % | BODY MASS INDEX: 25.39 KG/M2 | DIASTOLIC BLOOD PRESSURE: 62 MMHG | HEIGHT: 66 IN

## 2018-02-06 DIAGNOSIS — R12 HEARTBURN: ICD-10-CM

## 2018-02-06 DIAGNOSIS — K31.84 GASTROPARESIS DUE TO DM (HCC): Primary | ICD-10-CM

## 2018-02-06 DIAGNOSIS — K59.00 CONSTIPATION, UNSPECIFIED CONSTIPATION TYPE: ICD-10-CM

## 2018-02-06 DIAGNOSIS — E11.43 GASTROPARESIS DUE TO DM (HCC): Primary | ICD-10-CM

## 2018-02-06 PROCEDURE — 99213 OFFICE O/P EST LOW 20 MIN: CPT | Performed by: NURSE PRACTITIONER

## 2018-02-06 PROCEDURE — G8427 DOCREV CUR MEDS BY ELIG CLIN: HCPCS | Performed by: NURSE PRACTITIONER

## 2018-02-06 PROCEDURE — G8419 CALC BMI OUT NRM PARAM NOF/U: HCPCS | Performed by: NURSE PRACTITIONER

## 2018-02-06 PROCEDURE — 4004F PT TOBACCO SCREEN RCVD TLK: CPT | Performed by: NURSE PRACTITIONER

## 2018-02-06 PROCEDURE — 3046F HEMOGLOBIN A1C LEVEL >9.0%: CPT | Performed by: NURSE PRACTITIONER

## 2018-02-06 PROCEDURE — G8482 FLU IMMUNIZE ORDER/ADMIN: HCPCS | Performed by: NURSE PRACTITIONER

## 2018-02-06 RX ORDER — METOCLOPRAMIDE 5 MG/1
5 TABLET ORAL
Qty: 120 TABLET | Refills: 3 | Status: ON HOLD | OUTPATIENT
Start: 2018-02-06 | End: 2018-03-08 | Stop reason: HOSPADM

## 2018-02-06 ASSESSMENT — ENCOUNTER SYMPTOMS
CHOKING: 0
VOMITING: 1
SHORTNESS OF BREATH: 0
VOICE CHANGE: 0
ABDOMINAL DISTENTION: 0
BLOOD IN STOOL: 0
RECTAL PAIN: 0
NAUSEA: 1
COUGH: 0
ABDOMINAL PAIN: 0
TROUBLE SWALLOWING: 0
CONSTIPATION: 1
DIARRHEA: 0

## 2018-02-06 NOTE — PROGRESS NOTES
pallor, rash and wound. Neurological: Negative for weakness and light-headedness. Hematological: Negative for adenopathy. Does not bruise/bleed easily. Physical Exam   Constitutional: She is oriented to person, place, and time. She appears well-developed and well-nourished. No distress. /62   Pulse 66   Ht 5' 6\" (1.676 m)   Wt 158 lb (71.7 kg)   SpO2 97%   BMI 25.50 kg/m²    HENT:   Mouth/Throat: Mucous membranes are normal.   Cardiovascular: Normal rate and regular rhythm. Exam reveals gallop. No murmur heard. Pulmonary/Chest: Effort normal and breath sounds normal. No respiratory distress. Abdominal: Soft. Normal appearance and bowel sounds are normal. She exhibits no distension and no mass. There is no tenderness. There is no rebound and no guarding. Neurological: She is alert and oriented to person, place, and time. Skin: Skin is warm, dry and intact. No pallor. Nursing note and vitals reviewed. Assessment   1. Gastroparesis due to DM ([address]     2. Heartburn     3. Constipation, unspecified constipation type           Plan    Return in about 2 months (around 4/6/2018). -Risks of continued use of Reglan including tarditive dyskinesia, anxiety, and muscular spasticity, especially with use longer than 3 months discussed. She continues to verbalize understanding. (Consent signed in 11/17) She states that the risks are worth the quality of life that it has given her. We will continue Reglan 5 mg po 4 times daily and await opinion from [address] [place] [address] and [place] daily.  -Smoking cessation highly encouraged  -Call office with any signs of neurological symptoms due to Reglan and discontinue  -FU in 2 months.

## 2018-02-06 NOTE — PATIENT INSTRUCTIONS
Patient Education          metoclopramide  Pronunciation:  MET oh Melia stevens  Brand:  Metozolv ODT, Reglan  What is the most important information I should know about metoclopramide? NEVER TAKE METOCLOPRAMIDE IN LARGER AMOUNTS THAN RECOMMENDED, OR FOR LONGER THAN 12 WEEKS. High doses or long-term use of metoclopramide can cause a serious movement disorder that may not be reversible. Symptoms of this disorder include uncontrollable muscle movements of your lips, tongue, eyes, face, arms, or legs. The longer you take metoclopramide, the more likely you are to develop a serious movement disorder. The risk of this side effect is higher in women, diabetics, and older adults. You should not take this medication if you are allergic to metoclopramide, or if you have bleeding or blockage in your stomach or intestines, epilepsy or other seizure disorder, or an adrenal gland tumor (pheochromocytoma). Before you take metoclopramide, tell your doctor if you have kidney or liver disease, congestive heart failure, high blood pressure, diabetes, Parkinson's disease, or a history of depression. Do not drink alcohol. It can increase some of the side effects of metoclopramide. There are many other medicines that can interact with metoclopramide. Tell your doctor about all medications you use. This includes prescription, over-the-counter, vitamin, and herbal products. Do not start a new medication without telling your doctor. Keep a list of all your medicines and show it to any healthcare provider who treats you. Stop using metoclopramide and call your doctor at once if you have tremors or uncontrolled muscle movements, fever, stiff muscles, confusion, sweating, fast or uneven heartbeats, rapid breathing, depressed mood, thoughts of suicide or hurting yourself, hallucinations, anxiety, agitation, seizure, or jaundice (yellowing of your skin or eyes). What is metoclopramide?   Metoclopramide increases muscle contractions in (yellowing of your skin or eyes); or  · seizure (convulsions). Less serious side effects may include:  · feeling restless, drowsy, tired, or dizzy;  · headache, sleep problems (insomnia);  · nausea, vomiting, diarrhea;  · breast tenderness or swelling;  · changes in your menstrual periods; or  · urinating more than usual.  This is not a complete list of side effects and others may occur. Call your doctor for medical advice about side effects. You may report side effects to FDA at 2-158-FDA-3513. What other drugs will affect metoclopramide? Before using metoclopramide, tell your doctor if you regularly use other medicines that make you sleepy (such as cold or allergy medicine, sedatives, narcotic pain medicine, sleeping pills, muscle relaxers, and medicine for seizures, depression, or anxiety). They can add to sleepiness caused by metoclopramide.   Tell your doctor about all other medications you use, especially:  · acetaminophen (Tylenol);  · cyclosporine (Gengraf, Neoral, Sandimmune);  · digoxin (digitalis, Lanoxin);  · glycopyrrolate (Robinul);  · insulin;  · levodopa (Larodopa, Atamet, Parcopa, Sinemet);  · mepenzolate (Cantil);  · tetracycline (Ala-Tet, Brodspec, Panmycin, Sumycin, Tetracap);  · atropine (Donnatal, and others), benztropine (Cogentin), dimenhydrinate (Dramamine), methscopolamine (Pamine), or scopolamine (Transderm-Scop);  · bladder or urinary medications such as darifenacin (Enablex), flavoxate (Urispas), oxybutynin (Ditropan, Oxytrol), tolterodine (Detrol), or solifenacin (Vesicare);  · blood pressure medications;  · bronchodilators such as ipratroprium (Atrovent) or tiotropium (Spiriva);  · irritable bowel medications such as dicyclomine (Bentyl), hyoscyamine (Anaspaz, Cystospaz, Levsin), or propantheline (Pro-Banthine);  · an MAO inhibitor such as furazolidone (Furoxone), isocarboxazid (Marplan), phenelzine (Nardil), rasagiline (Azilect), selegiline (Eldepryl, Emsam, Zelapar), or

## 2018-03-05 ENCOUNTER — HOSPITAL ENCOUNTER (INPATIENT)
Age: 48
LOS: 3 days | Discharge: HOME | DRG: 189 | End: 2018-03-08
Attending: EMERGENCY MEDICINE | Admitting: INTERNAL MEDICINE
Payer: MEDICARE

## 2018-03-05 ENCOUNTER — APPOINTMENT (OUTPATIENT)
Dept: GENERAL RADIOLOGY | Age: 48
DRG: 189 | End: 2018-03-05
Payer: MEDICARE

## 2018-03-05 DIAGNOSIS — J81.0 FLASH PULMONARY EDEMA (HCC): ICD-10-CM

## 2018-03-05 DIAGNOSIS — J96.01 ACUTE RESPIRATORY FAILURE WITH HYPOXIA (HCC): Primary | ICD-10-CM

## 2018-03-05 DIAGNOSIS — N18.6 ESRD ON HEMODIALYSIS (HCC): ICD-10-CM

## 2018-03-05 DIAGNOSIS — I16.1 HYPERTENSIVE EMERGENCY: ICD-10-CM

## 2018-03-05 DIAGNOSIS — Z99.2 ESRD ON HEMODIALYSIS (HCC): ICD-10-CM

## 2018-03-05 PROBLEM — J96.90 RESPIRATORY FAILURE (HCC): Status: ACTIVE | Noted: 2018-03-05

## 2018-03-05 LAB
ALBUMIN SERPL-MCNC: 4.3 G/DL (ref 3.5–5.2)
ALP BLD-CCNC: 129 U/L (ref 35–104)
ALT SERPL-CCNC: 24 U/L (ref 5–33)
ANION GAP SERPL CALCULATED.3IONS-SCNC: 21 MMOL/L (ref 7–19)
AST SERPL-CCNC: 31 U/L (ref 5–32)
BACTERIA: ABNORMAL /HPF
BASE EXCESS ARTERIAL: -5.1 MMOL/L (ref -2–2)
BASE EXCESS ARTERIAL: -6 MMOL/L (ref -2–2)
BASOPHILS ABSOLUTE: 0.1 K/UL (ref 0–0.2)
BASOPHILS RELATIVE PERCENT: 1 % (ref 0–1)
BILIRUB SERPL-MCNC: 0.6 MG/DL (ref 0.2–1.2)
BILIRUBIN URINE: NEGATIVE
BLOOD, URINE: ABNORMAL
BUN BLDV-MCNC: 55 MG/DL (ref 6–20)
CALCIUM SERPL-MCNC: 8.5 MG/DL (ref 8.6–10)
CARBOXYHEMOGLOBIN ARTERIAL: 3.1 % (ref 0–5)
CARBOXYHEMOGLOBIN ARTERIAL: 3.8 % (ref 0–5)
CHLORIDE BLD-SCNC: 72 MMOL/L (ref 98–111)
CLARITY: CLEAR
CO2: 21 MMOL/L (ref 22–29)
COLOR: YELLOW
CREAT SERPL-MCNC: 5.2 MG/DL (ref 0.5–0.9)
EOSINOPHILS ABSOLUTE: 0 K/UL (ref 0–0.6)
EOSINOPHILS RELATIVE PERCENT: 0.1 % (ref 0–5)
GFR NON-AFRICAN AMERICAN: 9
GLUCOSE BLD-MCNC: 1323 MG/DL (ref 74–109)
GLUCOSE BLD-MCNC: 1343 MG/DL (ref 74–109)
GLUCOSE BLD-MCNC: 140 MG/DL (ref 70–99)
GLUCOSE BLD-MCNC: 216 MG/DL (ref 70–99)
GLUCOSE BLD-MCNC: 282 MG/DL (ref 70–99)
GLUCOSE BLD-MCNC: 337 MG/DL (ref 70–99)
GLUCOSE BLD-MCNC: 375 MG/DL (ref 70–99)
GLUCOSE BLD-MCNC: 73 MG/DL (ref 70–99)
GLUCOSE BLD-MCNC: 95 MG/DL (ref 70–99)
GLUCOSE BLD-MCNC: >550 MG/DL (ref 70–99)
GLUCOSE URINE: >=1000 MG/DL
HAV IGM SER IA-ACNC: NORMAL
HBA1C MFR BLD: 10.3 %
HCO3 ARTERIAL: 22.7 MMOL/L (ref 22–26)
HCO3 ARTERIAL: 23.5 MMOL/L (ref 22–26)
HCT VFR BLD CALC: 34.9 % (ref 37–47)
HEMOGLOBIN, ART, EXTENDED: 11.3 G/DL (ref 12–16)
HEMOGLOBIN, ART, EXTENDED: 12.5 G/DL (ref 12–16)
HEMOGLOBIN: 11.1 G/DL (ref 12–16)
HEPATITIS B CORE IGM ANTIBODY: NORMAL
HEPATITIS B SURFACE ANTIGEN INTERPRETATION: NORMAL
HEPATITIS C ANTIBODY INTERPRETATION: NORMAL
INR BLD: 1.05 (ref 0.88–1.18)
KETONES, URINE: NEGATIVE MG/DL
LACTIC ACID: 1.3 MMOL/L (ref 0.5–1.9)
LEUKOCYTE ESTERASE, URINE: NEGATIVE
LIPASE: 80 U/L (ref 13–60)
LYMPHOCYTES ABSOLUTE: 0.9 K/UL (ref 1.1–4.5)
LYMPHOCYTES RELATIVE PERCENT: 10.8 % (ref 20–40)
MCH RBC QN AUTO: 30.2 PG (ref 27–31)
MCHC RBC AUTO-ENTMCNC: 31.8 G/DL (ref 33–37)
MCV RBC AUTO: 95.1 FL (ref 81–99)
METHEMOGLOBIN ARTERIAL: 0.4 %
METHEMOGLOBIN ARTERIAL: 0.7 %
MONOCYTES ABSOLUTE: 0.3 K/UL (ref 0–0.9)
MONOCYTES RELATIVE PERCENT: 3.8 % (ref 0–10)
NEUTROPHILS ABSOLUTE: 7.2 K/UL (ref 1.5–7.5)
NEUTROPHILS RELATIVE PERCENT: 83.8 % (ref 50–65)
NITRITE, URINE: NEGATIVE
O2 CONTENT ARTERIAL: 14.1 ML/DL
O2 CONTENT ARTERIAL: 14.3 ML/DL
O2 SAT, ARTERIAL: 81.6 %
O2 SAT, ARTERIAL: 88.7 %
O2 THERAPY: ABNORMAL
O2 THERAPY: ABNORMAL
PCO2 ARTERIAL: 58 MMHG (ref 35–45)
PCO2 ARTERIAL: 60 MMHG (ref 35–45)
PDW BLD-RTO: 13.5 % (ref 11.5–14.5)
PERFORMED ON: ABNORMAL
PERFORMED ON: NORMAL
PERFORMED ON: NORMAL
PH ARTERIAL: 7.2 (ref 7.35–7.45)
PH ARTERIAL: 7.2 (ref 7.35–7.45)
PH UA: 7
PLATELET # BLD: 173 K/UL (ref 130–400)
PMV BLD AUTO: 10 FL (ref 9.4–12.3)
PO2 ARTERIAL: 50 MMHG (ref 80–100)
PO2 ARTERIAL: 68 MMHG (ref 80–100)
POTASSIUM SERPL-SCNC: 5.5 MMOL/L (ref 3.5–5)
POTASSIUM, WHOLE BLOOD: 5.5
POTASSIUM, WHOLE BLOOD: 6.1
PRO-BNP: ABNORMAL PG/ML (ref 0–450)
PROTEIN UA: >=300 MG/DL
PROTHROMBIN TIME: 13.6 SEC (ref 12–14.6)
RBC # BLD: 3.67 M/UL (ref 4.2–5.4)
RBC UA: ABNORMAL /HPF (ref 0–2)
SODIUM BLD-SCNC: 114 MMOL/L (ref 136–145)
SPECIFIC GRAVITY UA: 1.02
TOTAL PROTEIN: 7.8 G/DL (ref 6.6–8.7)
TROPONIN: 0.02 NG/ML (ref 0–0.03)
UROBILINOGEN, URINE: 0.2 E.U./DL
WBC # BLD: 8.6 K/UL (ref 4.8–10.8)
WBC UA: ABNORMAL /HPF (ref 0–5)

## 2018-03-05 PROCEDURE — 6360000002 HC RX W HCPCS

## 2018-03-05 PROCEDURE — 71045 X-RAY EXAM CHEST 1 VIEW: CPT

## 2018-03-05 PROCEDURE — 36415 COLL VENOUS BLD VENIPUNCTURE: CPT

## 2018-03-05 PROCEDURE — 81001 URINALYSIS AUTO W/SCOPE: CPT

## 2018-03-05 PROCEDURE — 2700000000 HC OXYGEN THERAPY PER DAY

## 2018-03-05 PROCEDURE — 2500000003 HC RX 250 WO HCPCS: Performed by: EMERGENCY MEDICINE

## 2018-03-05 PROCEDURE — 2580000003 HC RX 258: Performed by: EMERGENCY MEDICINE

## 2018-03-05 PROCEDURE — 94660 CPAP INITIATION&MGMT: CPT

## 2018-03-05 PROCEDURE — 99291 CRITICAL CARE FIRST HOUR: CPT | Performed by: EMERGENCY MEDICINE

## 2018-03-05 PROCEDURE — 83880 ASSAY OF NATRIURETIC PEPTIDE: CPT

## 2018-03-05 PROCEDURE — 80074 ACUTE HEPATITIS PANEL: CPT

## 2018-03-05 PROCEDURE — 83036 HEMOGLOBIN GLYCOSYLATED A1C: CPT

## 2018-03-05 PROCEDURE — 36600 WITHDRAWAL OF ARTERIAL BLOOD: CPT

## 2018-03-05 PROCEDURE — 82947 ASSAY GLUCOSE BLOOD QUANT: CPT

## 2018-03-05 PROCEDURE — 80053 COMPREHEN METABOLIC PANEL: CPT

## 2018-03-05 PROCEDURE — 85025 COMPLETE CBC W/AUTO DIFF WBC: CPT

## 2018-03-05 PROCEDURE — 99291 CRITICAL CARE FIRST HOUR: CPT | Performed by: INTERNAL MEDICINE

## 2018-03-05 PROCEDURE — 6370000000 HC RX 637 (ALT 250 FOR IP): Performed by: INTERNAL MEDICINE

## 2018-03-05 PROCEDURE — 83690 ASSAY OF LIPASE: CPT

## 2018-03-05 PROCEDURE — 93005 ELECTROCARDIOGRAM TRACING: CPT

## 2018-03-05 PROCEDURE — 99291 CRITICAL CARE FIRST HOUR: CPT

## 2018-03-05 PROCEDURE — 82948 REAGENT STRIP/BLOOD GLUCOSE: CPT

## 2018-03-05 PROCEDURE — 82803 BLOOD GASES ANY COMBINATION: CPT

## 2018-03-05 PROCEDURE — 5A1D70Z PERFORMANCE OF URINARY FILTRATION, INTERMITTENT, LESS THAN 6 HOURS PER DAY: ICD-10-PCS | Performed by: INTERNAL MEDICINE

## 2018-03-05 PROCEDURE — 84132 ASSAY OF SERUM POTASSIUM: CPT

## 2018-03-05 PROCEDURE — 84484 ASSAY OF TROPONIN QUANT: CPT

## 2018-03-05 PROCEDURE — 2580000003 HC RX 258: Performed by: INTERNAL MEDICINE

## 2018-03-05 PROCEDURE — 2000000000 HC ICU R&B

## 2018-03-05 PROCEDURE — 85610 PROTHROMBIN TIME: CPT

## 2018-03-05 PROCEDURE — 6360000002 HC RX W HCPCS: Performed by: EMERGENCY MEDICINE

## 2018-03-05 PROCEDURE — 83605 ASSAY OF LACTIC ACID: CPT

## 2018-03-05 PROCEDURE — 6370000000 HC RX 637 (ALT 250 FOR IP)

## 2018-03-05 RX ORDER — NICOTINE POLACRILEX 4 MG
15 LOZENGE BUCCAL PRN
Status: DISCONTINUED | OUTPATIENT
Start: 2018-03-05 | End: 2018-03-06 | Stop reason: SDUPTHER

## 2018-03-05 RX ORDER — DEXTROSE MONOHYDRATE 25 G/50ML
12.5 INJECTION, SOLUTION INTRAVENOUS PRN
Status: DISCONTINUED | OUTPATIENT
Start: 2018-03-05 | End: 2018-03-06 | Stop reason: SDUPTHER

## 2018-03-05 RX ORDER — LABETALOL HYDROCHLORIDE 5 MG/ML
10 INJECTION, SOLUTION INTRAVENOUS ONCE
Status: COMPLETED | OUTPATIENT
Start: 2018-03-05 | End: 2018-03-05

## 2018-03-05 RX ORDER — FUROSEMIDE 10 MG/ML
80 INJECTION INTRAMUSCULAR; INTRAVENOUS ONCE
Status: COMPLETED | OUTPATIENT
Start: 2018-03-05 | End: 2018-03-05

## 2018-03-05 RX ORDER — ROPINIROLE 2 MG/1
2 TABLET, FILM COATED ORAL NIGHTLY
Status: DISCONTINUED | OUTPATIENT
Start: 2018-03-05 | End: 2018-03-08 | Stop reason: HOSPADM

## 2018-03-05 RX ORDER — HYDRALAZINE HYDROCHLORIDE 50 MG/1
100 TABLET, FILM COATED ORAL 3 TIMES DAILY
Status: DISCONTINUED | OUTPATIENT
Start: 2018-03-05 | End: 2018-03-06

## 2018-03-05 RX ORDER — MIDAZOLAM HYDROCHLORIDE 1 MG/ML
INJECTION INTRAMUSCULAR; INTRAVENOUS
Status: COMPLETED
Start: 2018-03-05 | End: 2018-03-05

## 2018-03-05 RX ORDER — SODIUM CHLORIDE 0.9 % (FLUSH) 0.9 %
10 SYRINGE (ML) INJECTION PRN
Status: DISCONTINUED | OUTPATIENT
Start: 2018-03-05 | End: 2018-03-08 | Stop reason: HOSPADM

## 2018-03-05 RX ORDER — VALSARTAN 160 MG/1
320 TABLET ORAL DAILY
Status: DISCONTINUED | OUTPATIENT
Start: 2018-03-05 | End: 2018-03-08 | Stop reason: HOSPADM

## 2018-03-05 RX ORDER — SIMVASTATIN 40 MG
40 TABLET ORAL DAILY
Status: DISCONTINUED | OUTPATIENT
Start: 2018-03-05 | End: 2018-03-08 | Stop reason: HOSPADM

## 2018-03-05 RX ORDER — DEXTROSE MONOHYDRATE 50 MG/ML
100 INJECTION, SOLUTION INTRAVENOUS PRN
Status: DISCONTINUED | OUTPATIENT
Start: 2018-03-05 | End: 2018-03-06 | Stop reason: SDUPTHER

## 2018-03-05 RX ORDER — DULOXETIN HYDROCHLORIDE 60 MG/1
60 CAPSULE, DELAYED RELEASE ORAL DAILY
Status: DISCONTINUED | OUTPATIENT
Start: 2018-03-05 | End: 2018-03-08 | Stop reason: HOSPADM

## 2018-03-05 RX ORDER — CLONIDINE HYDROCHLORIDE 0.1 MG/1
0.1 TABLET ORAL 2 TIMES DAILY
Status: DISCONTINUED | OUTPATIENT
Start: 2018-03-05 | End: 2018-03-08 | Stop reason: HOSPADM

## 2018-03-05 RX ORDER — NIFEDIPINE 60 MG/1
60 TABLET, EXTENDED RELEASE ORAL DAILY
Status: DISCONTINUED | OUTPATIENT
Start: 2018-03-05 | End: 2018-03-08 | Stop reason: HOSPADM

## 2018-03-05 RX ORDER — MORPHINE SULFATE 4 MG/ML
2 INJECTION, SOLUTION INTRAMUSCULAR; INTRAVENOUS ONCE
Status: COMPLETED | OUTPATIENT
Start: 2018-03-05 | End: 2018-03-05

## 2018-03-05 RX ORDER — MIDAZOLAM HYDROCHLORIDE 1 MG/ML
0.5 INJECTION INTRAMUSCULAR; INTRAVENOUS ONCE
Status: COMPLETED | OUTPATIENT
Start: 2018-03-05 | End: 2018-03-05

## 2018-03-05 RX ORDER — LEVOTHYROXINE SODIUM 0.07 MG/1
150 TABLET ORAL DAILY
Status: DISCONTINUED | OUTPATIENT
Start: 2018-03-05 | End: 2018-03-08 | Stop reason: HOSPADM

## 2018-03-05 RX ORDER — ONDANSETRON 2 MG/ML
INJECTION INTRAMUSCULAR; INTRAVENOUS
Status: COMPLETED
Start: 2018-03-05 | End: 2018-03-05

## 2018-03-05 RX ORDER — ONDANSETRON 2 MG/ML
4 INJECTION INTRAMUSCULAR; INTRAVENOUS EVERY 6 HOURS PRN
Status: DISCONTINUED | OUTPATIENT
Start: 2018-03-05 | End: 2018-03-08 | Stop reason: HOSPADM

## 2018-03-05 RX ORDER — NITROGLYCERIN 0.4 MG/1
0.4 TABLET SUBLINGUAL EVERY 5 MIN PRN
Status: DISCONTINUED | OUTPATIENT
Start: 2018-03-05 | End: 2018-03-08 | Stop reason: HOSPADM

## 2018-03-05 RX ORDER — ISOSORBIDE MONONITRATE 60 MG/1
120 TABLET, EXTENDED RELEASE ORAL DAILY
Status: DISCONTINUED | OUTPATIENT
Start: 2018-03-05 | End: 2018-03-08 | Stop reason: HOSPADM

## 2018-03-05 RX ORDER — NITROGLYCERIN 0.4 MG/1
TABLET SUBLINGUAL
Status: COMPLETED
Start: 2018-03-05 | End: 2018-03-05

## 2018-03-05 RX ORDER — SODIUM CHLORIDE 0.9 % (FLUSH) 0.9 %
10 SYRINGE (ML) INJECTION EVERY 12 HOURS SCHEDULED
Status: DISCONTINUED | OUTPATIENT
Start: 2018-03-05 | End: 2018-03-08 | Stop reason: HOSPADM

## 2018-03-05 RX ORDER — METOPROLOL TARTRATE 50 MG/1
100 TABLET, FILM COATED ORAL 2 TIMES DAILY
Status: DISCONTINUED | OUTPATIENT
Start: 2018-03-05 | End: 2018-03-08 | Stop reason: HOSPADM

## 2018-03-05 RX ORDER — ONDANSETRON 2 MG/ML
4 INJECTION INTRAMUSCULAR; INTRAVENOUS ONCE
Status: COMPLETED | OUTPATIENT
Start: 2018-03-05 | End: 2018-03-05

## 2018-03-05 RX ADMIN — LEVOTHYROXINE SODIUM 150 MCG: 75 TABLET ORAL at 21:07

## 2018-03-05 RX ADMIN — ONDANSETRON 4 MG: 2 INJECTION INTRAMUSCULAR; INTRAVENOUS at 12:06

## 2018-03-05 RX ADMIN — INSULIN HUMAN 20 UNITS: 100 INJECTION, SOLUTION PARENTERAL at 17:35

## 2018-03-05 RX ADMIN — HYDRALAZINE HYDROCHLORIDE 100 MG: 50 TABLET, FILM COATED ORAL at 21:08

## 2018-03-05 RX ADMIN — SODIUM CHLORIDE 14 UNITS/HR: 9 INJECTION, SOLUTION INTRAVENOUS at 19:04

## 2018-03-05 RX ADMIN — DEXTROSE MONOHYDRATE 3 MG/HR: 50 INJECTION, SOLUTION INTRAVENOUS at 11:44

## 2018-03-05 RX ADMIN — SODIUM CHLORIDE 20 UNITS/HR: 9 INJECTION, SOLUTION INTRAVENOUS at 15:18

## 2018-03-05 RX ADMIN — LABETALOL HYDROCHLORIDE 10 MG: 5 INJECTION INTRAVENOUS at 11:10

## 2018-03-05 RX ADMIN — NITROGLYCERIN 0.4 MG: 0.4 TABLET SUBLINGUAL at 11:45

## 2018-03-05 RX ADMIN — Medication 10 ML: at 21:07

## 2018-03-05 RX ADMIN — MIDAZOLAM 0.5 MG: 1 INJECTION INTRAMUSCULAR; INTRAVENOUS at 11:26

## 2018-03-05 RX ADMIN — SIMVASTATIN 40 MG: 40 TABLET, FILM COATED ORAL at 21:08

## 2018-03-05 RX ADMIN — ISOSORBIDE MONONITRATE 120 MG: 60 TABLET, EXTENDED RELEASE ORAL at 21:06

## 2018-03-05 RX ADMIN — METOPROLOL TARTRATE 100 MG: 50 TABLET ORAL at 21:08

## 2018-03-05 RX ADMIN — DULOXETINE HYDROCHLORIDE 60 MG: 60 CAPSULE, DELAYED RELEASE ORAL at 21:07

## 2018-03-05 RX ADMIN — MIDAZOLAM 0.5 MG: 1 INJECTION INTRAMUSCULAR; INTRAVENOUS at 11:10

## 2018-03-05 RX ADMIN — ROPINIROLE HYDROCHLORIDE 2 MG: 2 TABLET, FILM COATED ORAL at 21:08

## 2018-03-05 RX ADMIN — FUROSEMIDE 80 MG: 10 INJECTION, SOLUTION INTRAMUSCULAR; INTRAVENOUS at 11:10

## 2018-03-05 RX ADMIN — VALSARTAN 320 MG: 160 TABLET ORAL at 21:06

## 2018-03-05 RX ADMIN — MIDAZOLAM HYDROCHLORIDE 0.5 MG: 1 INJECTION, SOLUTION INTRAMUSCULAR; INTRAVENOUS at 11:46

## 2018-03-05 RX ADMIN — Medication 2 MG: at 11:10

## 2018-03-05 RX ADMIN — CLONIDINE HYDROCHLORIDE 0.1 MG: 0.1 TABLET ORAL at 21:08

## 2018-03-05 RX ADMIN — NIFEDIPINE 60 MG: 60 TABLET, FILM COATED, EXTENDED RELEASE ORAL at 21:07

## 2018-03-05 ASSESSMENT — ENCOUNTER SYMPTOMS
SHORTNESS OF BREATH: 1
COUGH: 0

## 2018-03-05 ASSESSMENT — PAIN SCALES - GENERAL: PAINLEVEL_OUTOF10: 0

## 2018-03-05 NOTE — PROGRESS NOTES
Blood Gas, Arterial [[id number]] (Abnormal) Collected: 03/05/18 1106     Specimen: Blood gases Updated: 03/05/18 1108      pH, Arterial 7.200 (LL)      pCO2, Arterial 58.0 (H) mmHg       pO2, Arterial 50.0 (LL) mmHg       HCO3, Arterial 22.7 mmol/L       Base Excess, Arterial -6.0 (L) mmol/L       Hemoglobin, Art, Extended 12.5 g/dL       O2 Sat, Arterial 81.6 (LL) %       Carboxyhgb, Arterial 3.8 %       Methemoglobin, Arterial 0.4 %       O2 Content, Arterial 14.3 mL/dL       O2 Therapy Unknown     Narrative:       CALL  Randy Verma RN, 03/05/2018 11:08, by Margo Mann         Pt on NRB  ABG at RR,   +AT

## 2018-03-05 NOTE — CONSULTS
Inpatient consult to Nephrology  Consult performed by: Rui FOLEY  Consult ordered by: Charlotte Souza  Assessment/Recommendations: Nephrology [facility]) Consult Note      Patient:  Danya Molina  YOB: 1970  Date of Service: 3/5/2018  MRN: [id number]   Acct: [de-identified]   Primary Care Physician: ROMINA Elizabeth  Advance Directive: Prior  Admit Date: 3/5/2018       Hospital Day: 0  Referring Provider: Leslie Jasso MD    Patient independently seen and examined, Chart, Consults, Notes, Operative notes, Labs, Cardiology, and Radiology studies reviewed as able. Result consultation: Pulmonary venous/severe hyponatremia/hyperkalemia and volume overloaded. Subjective:  Danya Molina is a 51 y.o. female  whom we were consulted for severe hyponatremia/pulmonary edema. Patient has end-stage renal disease secondary to diabetic nephropathy. Apparently she has received routine hemodialysis treatment on Saturday. She presented to the emergency room complaining of increasing shortness of breath. She was found to have blood sugar of 1320, serum sodium was 115, potassium of 5.5 with BNP is 35,000. Patient is currently admitted to ICU on BiPAP with 30 L of oxygen. Chest x-ray shows florid pulmonary edema. Her blood pressure is 190/100 mmHg. She is confused and disoriented.     Allergies:  Lamisil advanced (terbinafine hcl) and Penicillins    Medicines:  Current Facility-Administered Medications:  nitroGLYCERIN (NITROSTAT) SL tablet 0.4 mg, 0.4 mg, Sublingual, Q5 Min PRN, Shane Castelan MD, 0.4 mg at 03/05/18 1145  niCARdipine (CARDENE) 25 mg in dextrose 5 % 250 mL infusion, 3 mg/hr, Intravenous, Continuous, Shane Castelan MD, Last Rate: 30 mL/hr at 03/05/18 1144, 3 mg/hr at 03/05/18 1144        Past Medical History:  Past Medical History:  No date: Arthritis  No date: Chronic kidney disease, stage 5, kidney failur*  06/1993: DM (diabetes mellitus) type II controlled

## 2018-03-05 NOTE — ED PROVIDER NOTES
(REGLAN) 5 MG TABLET    Take 1 tablet by mouth 4 times daily (before meals and nightly)    METOPROLOL (LOPRESSOR) 100 MG TABLET    Take 100 mg by mouth 2 times daily    NIFEDIPINE (ADALAT CC) 60 MG EXTENDED RELEASE TABLET    Take 60 mg by mouth daily    NUTRITIONAL SUPPLEMENTS (GLUCOSE MANAGEMENT) TABS    Take by mouth    ONDANSETRON (ZOFRAN) 4 MG TABLET    Take 4 mg by mouth every 8 hours as needed for Nausea or Vomiting    ROPINIROLE (REQUIP) 2 MG TABLET    Take 2 mg by mouth nightly    SENNA (SENOKOT) 8.6 MG TABS TABLET    Take by mouth    SIMVASTATIN (ZOCOR) 40 MG TABLET    Take 40 mg by mouth daily    VALSARTAN (DIOVAN) 320 MG TABLET    Take 320 mg by mouth daily       ALLERGIES     Lamisil advanced [terbinafine hcl] and Penicillins    FAMILY HISTORY       Family History   Problem Relation Age of Onset    Colon Cancer Mother       at 61    Colon Polyps Mother     Lung Cancer Father       at 66    Colon Polyps Father     Stomach Cancer Sister     Breast Cancer Sister     Depression Daughter 25     committed suicide    Liver Cancer Neg Hx     Liver Disease Neg Hx     Esophageal Cancer Neg Hx     Rectal Cancer Neg Hx           SOCIAL HISTORY       Social History     Social History    Marital status:      Spouse name: N/A    Number of children: 2    Years of education: N/A     Social History Main Topics    Smoking status: Current Every Day Smoker     Packs/day: 0.50     Years: 25.00     Types: Cigarettes     Last attempt to quit: 2017    Smokeless tobacco: Never Used    Alcohol use No    Drug use: No    Sexual activity: Not on file     Other Topics Concern    Not on file     Social History Narrative    No narrative on file       SCREENINGS             PHYSICAL EXAM    (up to 7 for level 4, 8 or more for level 5)     ED Triage Vitals   BP Temp Temp Source Pulse Resp SpO2 Height Weight   18 1100 18 1103 18 1103 18 1100 18 1103 18 1100 Narrative:     Gaby Medrano tel. ,  Taina Young RN, 03/05/2018 11:08, by Filippo Russell   CBC WITH AUTO DIFFERENTIAL   COMPREHENSIVE METABOLIC PANEL   LACTIC ACID, PLASMA   LIPASE   BRAIN NATRIURETIC PEPTIDE   PROTIME-INR   TROPONIN       All other labs were within normal range or not returned as of this dictation. EMERGENCY DEPARTMENT COURSE and DIFFERENTIAL DIAGNOSIS/MDM:   Vitals:    Vitals:    03/05/18 1100 03/05/18 1102 03/05/18 1103 03/05/18 1121   BP: (!) 206/103 (!) 224/112 (!) 228/96 (!) 201/121   Pulse: 84 90 102 85   Resp:   30    Temp:   98.2 °F (36.8 °C)    TempSrc:   Oral    SpO2: (!) 87% (!) 80% (!) 83% (!) 87%   Weight:   157 lb (71.2 kg)    Height:   5' 6\" (1.676 m)        MDM  Number of Diagnoses or Management Options  Acute respiratory failure with hypoxia ([facility] [address]: new and requires workup  ESRD on hemodialysis [facility]): new and requires workup  Flash pulmonary edema [facility]): new and requires workup  Hypertensive emergency: new and requires workup  Diagnosis management comments: Patient found to be in respiratory distress in extremis on arrival. Patient with clinical picture of volume overload as well as hypertensive emergency with flash pulmonary edema. Patient end-stage renal disease on hemodialysis. Patient did not tolerate BiPAP well initially she was placed on nonrebreather mask. Patient required some slight aliquots of sedation including Versed multiple times and morphine to help her relax and tolerate the BiPAP. When she tolerated the BiPAP her oxygenation and sat came up into the 90s. The patient had a Sommer catheter placed 80 mg Lasix IV was given. The patient makes urine. The patient has not missed dialysis in the last week. She needs emergent dialysis I discussed this with Dr. Mk Porter they will arrange for this in ICU. The patient also has hypertensive emergency with flash pulmonary edema. While some of this is secondary to her volume overload I do think she needs her blood pressure reduced.  She

## 2018-03-05 NOTE — H&P
Hospital Medicine History & Physical      PCP: ROMINA Andrade    Date of Admission: 3/5/2018    Date of Service: Pt seen/examined on 03/05/2018 and Admitted to Inpatient with expected LOS greater than two midnights due to medical therapy. Chief Complaint:  SOB      History Of Present Illness:    51 y.o. female who presented to ER with worsening SOB today. Stated she did go to dialysis as scheduled on Saturday and had 5 L pulled off. Denies eating any foods high in salt or sugars. States she only ate yogurt this weekend. Found to be hypoxic and worsening respiratory rate. No fevers or chills. No N/V. No chest pain. Also found to be in hypertensive urgency as well. She was started on nicardipine drip in ER and has responded well. Currently undergoing emergent dialysis in ICU. Past Medical History:          Diagnosis Date    Arthritis     Chronic kidney disease, stage 5, kidney failure ([facility] [address]     DM (diabetes mellitus) type II controlled with renal manifestation ([facility] [address] 06/1993    Gastroparesis     GERD (gastroesophageal reflux disease)     Hypertension     Hypothyroid     Restless leg syndrome        Past Surgical History:          Procedure Laterality Date    BREAST SURGERY      infected milk gland removed    CHOLECYSTECTOMY      COLONOSCOPY      ENDOMETRIAL ABLATION  2012    GALLBLADDER SURGERY      WI COLONOSCOPY W/BIOPSY SINGLE/MULTIPLE N/A 10/20/2017    Dr Aleksey Cha prep-Tubular AP (-) dysplasia x 2--3 yr recall    [place] EGD TRANSORAL BIOPSY SINGLE/MULTIPLE N/A 12/27/2016    Dr Roma Astudillo EGD TRANSORAL BIOPSY SINGLE/MULTIPLE N/A 10/20/2017    Dr Sarah Gruber (-)    TUBAL LIGATION      VASCULAR SURGERY Left 2016    fistula by Dr Hari Du at [address]  10/02/2017    [facility]. Left upper fistulograms/venograms, balloon angioplasty cephalic vein arch [id number] conquest.       Medications Prior to Admission:      Prior to Admission medications    Medication deformity. Skin: Skin color, texture, turgor normal.  No rashes or lesions. Neurologic:  Neurovascularly intact without any focal sensory/motor deficits. Cranial nerves: II-XII intact, grossly non-focal.  Generalized weakness. Psychiatric: Alert and oriented, thought content appropriate, normal insight        CXR:   Narrative   Exam:   XR CHEST PORTABLE     Date:  3/5/2018    History:  Female, age  52 years; shortness of breath   COMPARISON:  Chest x-ray dated 11/26/2017. Findings : Moderate cardiomegaly. Bilateral central dense opacities, confluent,   most concerning for pulmonary edema in this clinical setting. . No   definite pleural effusion. No measurable pneumothorax.  The bones show   no acute pathology.         Impression   Impression:   Findings of fluid overload.    Signed by Dr Rachel Whitney on 3/5/2018 11:10 AM         Labs:     Recent Labs      03/05/18   1131   WBC  8.6   HGB  11.1*   HCT  34.9*   PLT  173     Recent Labs      03/05/18   1106  03/05/18   1130  03/05/18   1201   NA   --   114*   --    K  5.5  5.5*  6.1   CL   --   72*   --    CO2   --   21*   --    BUN   --   55*   --    CREATININE   --   5.2*   --    CALCIUM   --   8.5*   --      Recent Labs      03/05/18   1130   AST  31   ALT  24   BILITOT  0.6   ALKPHOS  129*     Recent Labs      03/05/18   1130   INR  1.05     Recent Labs      03/05/18   1130   TROPONINI  0.02       Urinalysis:      Lab Results   Component Value Date    NITRU Negative 12/08/2017    WBCUA 3 12/08/2017    BACTERIA NEGATIVE 12/08/2017    RBCUA 9 12/08/2017    BLOODU SMALL 12/08/2017    SPECGRAV 1.017 12/08/2017    GLUCOSEU >=1000 12/08/2017         ASSESSMENT:    Active Hospital Problems    Diagnosis Date Noted    [facility] [J96.90] 03/05/2018    Type 2 diabetes mellitus with chronic kidney disease on chronic dialysis, with long-term current use of insulin ([facility] [address] [E11.22, N18.6, Z99.2, Z79.4] 12/09/2017    ESRD on hemodialysis ([facility] [address] [N18.6,

## 2018-03-06 ENCOUNTER — APPOINTMENT (OUTPATIENT)
Dept: GENERAL RADIOLOGY | Age: 48
DRG: 189 | End: 2018-03-06
Payer: MEDICARE

## 2018-03-06 LAB
ALBUMIN SERPL-MCNC: 3.4 G/DL (ref 3.5–5.2)
ALP BLD-CCNC: 90 U/L (ref 35–104)
ALT SERPL-CCNC: 17 U/L (ref 5–33)
ANION GAP SERPL CALCULATED.3IONS-SCNC: 15 MMOL/L (ref 7–19)
AST SERPL-CCNC: 19 U/L (ref 5–32)
BASOPHILS ABSOLUTE: 0 K/UL (ref 0–0.2)
BASOPHILS RELATIVE PERCENT: 0.4 % (ref 0–1)
BILIRUB SERPL-MCNC: 0.4 MG/DL (ref 0.2–1.2)
BUN BLDV-MCNC: 29 MG/DL (ref 6–20)
CALCIUM SERPL-MCNC: 9 MG/DL (ref 8.6–10)
CHLORIDE BLD-SCNC: 93 MMOL/L (ref 98–111)
CHOLESTEROL, TOTAL: 159 MG/DL (ref 160–199)
CO2: 28 MMOL/L (ref 22–29)
CREAT SERPL-MCNC: 3.8 MG/DL (ref 0.5–0.9)
EOSINOPHILS ABSOLUTE: 0 K/UL (ref 0–0.6)
EOSINOPHILS RELATIVE PERCENT: 0.3 % (ref 0–5)
GFR NON-AFRICAN AMERICAN: 13
GLUCOSE BLD-MCNC: 101 MG/DL (ref 70–99)
GLUCOSE BLD-MCNC: 113 MG/DL (ref 74–109)
GLUCOSE BLD-MCNC: 125 MG/DL (ref 70–99)
GLUCOSE BLD-MCNC: 138 MG/DL (ref 70–99)
GLUCOSE BLD-MCNC: 213 MG/DL (ref 70–99)
GLUCOSE BLD-MCNC: 241 MG/DL (ref 70–99)
GLUCOSE BLD-MCNC: 248 MG/DL (ref 70–99)
GLUCOSE BLD-MCNC: 253 MG/DL (ref 70–99)
GLUCOSE BLD-MCNC: 61 MG/DL (ref 70–99)
HBA1C MFR BLD: 10.1 %
HCT VFR BLD CALC: 30.9 % (ref 37–47)
HDLC SERPL-MCNC: 92 MG/DL (ref 65–121)
HEMOGLOBIN: 10.3 G/DL (ref 12–16)
LDL CHOLESTEROL CALCULATED: 59 MG/DL
LYMPHOCYTES ABSOLUTE: 1.3 K/UL (ref 1.1–4.5)
LYMPHOCYTES RELATIVE PERCENT: 13.4 % (ref 20–40)
MCH RBC QN AUTO: 30.3 PG (ref 27–31)
MCHC RBC AUTO-ENTMCNC: 33.3 G/DL (ref 33–37)
MCV RBC AUTO: 90.9 FL (ref 81–99)
MONOCYTES ABSOLUTE: 0.9 K/UL (ref 0–0.9)
MONOCYTES RELATIVE PERCENT: 9 % (ref 0–10)
NEUTROPHILS ABSOLUTE: 7.4 K/UL (ref 1.5–7.5)
NEUTROPHILS RELATIVE PERCENT: 76.6 % (ref 50–65)
PDW BLD-RTO: 13 % (ref 11.5–14.5)
PERFORMED ON: ABNORMAL
PLATELET # BLD: 139 K/UL (ref 130–400)
PMV BLD AUTO: 9.7 FL (ref 9.4–12.3)
POTASSIUM SERPL-SCNC: 3.9 MMOL/L (ref 3.5–5)
RBC # BLD: 3.4 M/UL (ref 4.2–5.4)
SODIUM BLD-SCNC: 136 MMOL/L (ref 136–145)
TOTAL PROTEIN: 6.2 G/DL (ref 6.6–8.7)
TRIGL SERPL-MCNC: 39 MG/DL (ref 0–149)
TSH SERPL DL<=0.05 MIU/L-ACNC: 11.25 UIU/ML (ref 0.27–4.2)
WBC # BLD: 9.6 K/UL (ref 4.8–10.8)

## 2018-03-06 PROCEDURE — 1210000000 HC MED SURG R&B

## 2018-03-06 PROCEDURE — 8010000000 HC HEMODIALYSIS ACUTE INPT

## 2018-03-06 PROCEDURE — 80061 LIPID PANEL: CPT

## 2018-03-06 PROCEDURE — 6370000000 HC RX 637 (ALT 250 FOR IP): Performed by: INTERNAL MEDICINE

## 2018-03-06 PROCEDURE — 94664 DEMO&/EVAL PT USE INHALER: CPT

## 2018-03-06 PROCEDURE — 99232 SBSQ HOSP IP/OBS MODERATE 35: CPT | Performed by: INTERNAL MEDICINE

## 2018-03-06 PROCEDURE — 71045 X-RAY EXAM CHEST 1 VIEW: CPT

## 2018-03-06 PROCEDURE — 36415 COLL VENOUS BLD VENIPUNCTURE: CPT

## 2018-03-06 PROCEDURE — 84443 ASSAY THYROID STIM HORMONE: CPT

## 2018-03-06 PROCEDURE — 82948 REAGENT STRIP/BLOOD GLUCOSE: CPT

## 2018-03-06 PROCEDURE — 2580000003 HC RX 258: Performed by: INTERNAL MEDICINE

## 2018-03-06 PROCEDURE — 80053 COMPREHEN METABOLIC PANEL: CPT

## 2018-03-06 PROCEDURE — 83036 HEMOGLOBIN GLYCOSYLATED A1C: CPT

## 2018-03-06 PROCEDURE — 6360000002 HC RX W HCPCS: Performed by: INTERNAL MEDICINE

## 2018-03-06 PROCEDURE — 85025 COMPLETE CBC W/AUTO DIFF WBC: CPT

## 2018-03-06 RX ORDER — NICOTINE POLACRILEX 4 MG
15 LOZENGE BUCCAL PRN
Status: DISCONTINUED | OUTPATIENT
Start: 2018-03-06 | End: 2018-03-08 | Stop reason: HOSPADM

## 2018-03-06 RX ORDER — ACETAMINOPHEN 325 MG/1
650 TABLET ORAL EVERY 4 HOURS PRN
Status: DISCONTINUED | OUTPATIENT
Start: 2018-03-06 | End: 2018-03-08 | Stop reason: HOSPADM

## 2018-03-06 RX ORDER — HYDRALAZINE HYDROCHLORIDE 50 MG/1
50 TABLET, FILM COATED ORAL 3 TIMES DAILY
Status: DISCONTINUED | OUTPATIENT
Start: 2018-03-06 | End: 2018-03-08 | Stop reason: HOSPADM

## 2018-03-06 RX ORDER — DEXTROSE MONOHYDRATE 50 MG/ML
100 INJECTION, SOLUTION INTRAVENOUS PRN
Status: DISCONTINUED | OUTPATIENT
Start: 2018-03-06 | End: 2018-03-08 | Stop reason: HOSPADM

## 2018-03-06 RX ORDER — DEXTROSE MONOHYDRATE 25 G/50ML
12.5 INJECTION, SOLUTION INTRAVENOUS PRN
Status: DISCONTINUED | OUTPATIENT
Start: 2018-03-06 | End: 2018-03-08 | Stop reason: HOSPADM

## 2018-03-06 RX ADMIN — SIMVASTATIN 40 MG: 40 TABLET, FILM COATED ORAL at 09:18

## 2018-03-06 RX ADMIN — NIFEDIPINE 60 MG: 60 TABLET, FILM COATED, EXTENDED RELEASE ORAL at 11:54

## 2018-03-06 RX ADMIN — ACETAMINOPHEN 650 MG: 325 TABLET, FILM COATED ORAL at 14:19

## 2018-03-06 RX ADMIN — Medication 10 ML: at 21:35

## 2018-03-06 RX ADMIN — INSULIN LISPRO 4 UNITS: 100 INJECTION, SOLUTION INTRAVENOUS; SUBCUTANEOUS at 07:30

## 2018-03-06 RX ADMIN — HYDRALAZINE HYDROCHLORIDE 50 MG: 50 TABLET ORAL at 21:35

## 2018-03-06 RX ADMIN — INSULIN LISPRO 2 UNITS: 100 INJECTION, SOLUTION INTRAVENOUS; SUBCUTANEOUS at 21:37

## 2018-03-06 RX ADMIN — LEVOTHYROXINE SODIUM 150 MCG: 75 TABLET ORAL at 06:30

## 2018-03-06 RX ADMIN — ISOSORBIDE MONONITRATE 120 MG: 60 TABLET, EXTENDED RELEASE ORAL at 11:53

## 2018-03-06 RX ADMIN — VALSARTAN 320 MG: 160 TABLET ORAL at 09:19

## 2018-03-06 RX ADMIN — DEXTROSE MONOHYDRATE 12.5 G: 25 INJECTION, SOLUTION INTRAVENOUS at 01:25

## 2018-03-06 RX ADMIN — INSULIN LISPRO 6 UNITS: 100 INJECTION, SOLUTION INTRAVENOUS; SUBCUTANEOUS at 18:05

## 2018-03-06 RX ADMIN — CLONIDINE HYDROCHLORIDE 0.1 MG: 0.1 TABLET ORAL at 11:54

## 2018-03-06 RX ADMIN — METOPROLOL TARTRATE 100 MG: 50 TABLET ORAL at 11:53

## 2018-03-06 RX ADMIN — ROPINIROLE HYDROCHLORIDE 2 MG: 2 TABLET, FILM COATED ORAL at 21:35

## 2018-03-06 RX ADMIN — ONDANSETRON 4 MG: 2 INJECTION INTRAMUSCULAR; INTRAVENOUS at 05:28

## 2018-03-06 RX ADMIN — HYDRALAZINE HYDROCHLORIDE 50 MG: 50 TABLET ORAL at 13:08

## 2018-03-06 RX ADMIN — CLONIDINE HYDROCHLORIDE 0.1 MG: 0.1 TABLET ORAL at 21:35

## 2018-03-06 RX ADMIN — METOPROLOL TARTRATE 100 MG: 50 TABLET ORAL at 21:35

## 2018-03-06 RX ADMIN — DULOXETINE HYDROCHLORIDE 60 MG: 60 CAPSULE, DELAYED RELEASE ORAL at 09:18

## 2018-03-06 ASSESSMENT — PAIN SCALES - GENERAL
PAINLEVEL_OUTOF10: 5
PAINLEVEL_OUTOF10: 3
PAINLEVEL_OUTOF10: 0

## 2018-03-06 NOTE — PROGRESS NOTES
Occupational Therapy    Attempted to see pt x2 this am, pt gone to dialysis will re-attempt OT evaluation as able.      Electronically signed by Maria Ines Brown OT on 3/6/2018 at 11:52 AM

## 2018-03-06 NOTE — PROGRESS NOTES
BILIRUBINUR  Negative   BLOODU  SMALL*   GLUCOSEU  >=1000*       Cultures:  None pending    Films:  CXR:  mpression   Impression:   Decreasing findings of fluid overload. Signed by Dr Rachel Whitney on 3/6/2018 7:23 AM       CXR:  mpression   Impression:   Findings of fluid overload. Signed by Dr Rachel Whitney on 3/5/2018 11:10 AM         Assessment:  Active Hospital Problems    Diagnosis Date Noted    Respiratory failure [facility]) [J96.90] 03/05/2018    Type 2 diabetes mellitus with chronic kidney disease on chronic dialysis, with long-term current use of insulin (HCC) [E11.22, N18.6, Z99.2, Z79.4] 12/09/2017    ESRD on hemodialysis ([facility] [address] [N18.6, Z99.2]     Anemia in chronic kidney disease (CKD) [N18.9, D63.1] 05/23/2017    Gastroparesis [K31.84]     Gastroesophageal reflux disease without esophagitis [K21.9]     Acquired hypothyroidism [E03.9]            Plan:  Blood glucose much improved. SSI, POCT glucose checks, adjust insulin as needed. HD per nephrology. Restart home medications as clinically indicated. Strict I/Os. Monitor BP closely. Ok to floor. Repeat labs in am.  See all orders. Further recommendations per clinical course.             Wayne Aquino DO

## 2018-03-07 PROBLEM — J96.01 ACUTE RESPIRATORY FAILURE WITH HYPOXIA AND HYPERCAPNIA (HCC): Status: ACTIVE | Noted: 2018-03-07

## 2018-03-07 PROBLEM — J96.02 ACUTE RESPIRATORY FAILURE WITH HYPOXIA AND HYPERCAPNIA (HCC): Status: ACTIVE | Noted: 2018-03-07

## 2018-03-07 LAB
ALBUMIN SERPL-MCNC: 3 G/DL (ref 3.5–5.2)
ALP BLD-CCNC: 85 U/L (ref 35–104)
ALT SERPL-CCNC: 17 U/L (ref 5–33)
ANION GAP SERPL CALCULATED.3IONS-SCNC: 18 MMOL/L (ref 7–19)
AST SERPL-CCNC: 27 U/L (ref 5–32)
BILIRUB SERPL-MCNC: 0.4 MG/DL (ref 0.2–1.2)
BUN BLDV-MCNC: 33 MG/DL (ref 6–20)
CALCIUM SERPL-MCNC: 8.8 MG/DL (ref 8.6–10)
CHLORIDE BLD-SCNC: 89 MMOL/L (ref 98–111)
CO2: 22 MMOL/L (ref 22–29)
CREAT SERPL-MCNC: 4.5 MG/DL (ref 0.5–0.9)
GFR NON-AFRICAN AMERICAN: 10
GLUCOSE BLD-MCNC: 224 MG/DL (ref 70–99)
GLUCOSE BLD-MCNC: 229 MG/DL (ref 70–99)
GLUCOSE BLD-MCNC: 307 MG/DL (ref 70–99)
GLUCOSE BLD-MCNC: 408 MG/DL (ref 70–99)
GLUCOSE BLD-MCNC: 423 MG/DL (ref 74–109)
HCT VFR BLD CALC: 30.1 % (ref 37–47)
HEMOGLOBIN: 9.7 G/DL (ref 12–16)
MCH RBC QN AUTO: 30.4 PG (ref 27–31)
MCHC RBC AUTO-ENTMCNC: 32.2 G/DL (ref 33–37)
MCV RBC AUTO: 94.4 FL (ref 81–99)
PDW BLD-RTO: 13.4 % (ref 11.5–14.5)
PERFORMED ON: ABNORMAL
PLATELET # BLD: 130 K/UL (ref 130–400)
PMV BLD AUTO: 10.2 FL (ref 9.4–12.3)
POTASSIUM SERPL-SCNC: 5.5 MMOL/L (ref 3.5–5)
RBC # BLD: 3.19 M/UL (ref 4.2–5.4)
SODIUM BLD-SCNC: 129 MMOL/L (ref 136–145)
TOTAL PROTEIN: 6.2 G/DL (ref 6.6–8.7)
WBC # BLD: 6.2 K/UL (ref 4.8–10.8)

## 2018-03-07 PROCEDURE — G8989 SELF CARE D/C STATUS: HCPCS

## 2018-03-07 PROCEDURE — 99232 SBSQ HOSP IP/OBS MODERATE 35: CPT | Performed by: INTERNAL MEDICINE

## 2018-03-07 PROCEDURE — 6370000000 HC RX 637 (ALT 250 FOR IP): Performed by: INTERNAL MEDICINE

## 2018-03-07 PROCEDURE — 6360000002 HC RX W HCPCS: Performed by: INTERNAL MEDICINE

## 2018-03-07 PROCEDURE — 1210000000 HC MED SURG R&B

## 2018-03-07 PROCEDURE — 82948 REAGENT STRIP/BLOOD GLUCOSE: CPT

## 2018-03-07 PROCEDURE — G8978 MOBILITY CURRENT STATUS: HCPCS

## 2018-03-07 PROCEDURE — G8987 SELF CARE CURRENT STATUS: HCPCS

## 2018-03-07 PROCEDURE — 97164 PT RE-EVAL EST PLAN CARE: CPT

## 2018-03-07 PROCEDURE — G8979 MOBILITY GOAL STATUS: HCPCS

## 2018-03-07 PROCEDURE — 36415 COLL VENOUS BLD VENIPUNCTURE: CPT

## 2018-03-07 PROCEDURE — 80053 COMPREHEN METABOLIC PANEL: CPT

## 2018-03-07 PROCEDURE — 97165 OT EVAL LOW COMPLEX 30 MIN: CPT

## 2018-03-07 PROCEDURE — 97163 PT EVAL HIGH COMPLEX 45 MIN: CPT

## 2018-03-07 PROCEDURE — G8988 SELF CARE GOAL STATUS: HCPCS

## 2018-03-07 PROCEDURE — 85027 COMPLETE CBC AUTOMATED: CPT

## 2018-03-07 PROCEDURE — 2580000003 HC RX 258: Performed by: INTERNAL MEDICINE

## 2018-03-07 RX ORDER — HYDRALAZINE HYDROCHLORIDE 20 MG/ML
10 INJECTION INTRAMUSCULAR; INTRAVENOUS EVERY 6 HOURS PRN
Status: DISCONTINUED | OUTPATIENT
Start: 2018-03-07 | End: 2018-03-08 | Stop reason: HOSPADM

## 2018-03-07 RX ORDER — SODIUM POLYSTYRENE SULFONATE 15 G/60ML
15 SUSPENSION ORAL; RECTAL ONCE
Status: COMPLETED | OUTPATIENT
Start: 2018-03-07 | End: 2018-03-07

## 2018-03-07 RX ORDER — INSULIN GLARGINE 100 [IU]/ML
15 INJECTION, SOLUTION SUBCUTANEOUS NIGHTLY
Status: DISCONTINUED | OUTPATIENT
Start: 2018-03-07 | End: 2018-03-08 | Stop reason: HOSPADM

## 2018-03-07 RX ADMIN — ISOSORBIDE MONONITRATE 120 MG: 60 TABLET, EXTENDED RELEASE ORAL at 08:16

## 2018-03-07 RX ADMIN — INSULIN LISPRO 8 UNITS: 100 INJECTION, SOLUTION INTRAVENOUS; SUBCUTANEOUS at 12:10

## 2018-03-07 RX ADMIN — INSULIN LISPRO 3 UNITS: 100 INJECTION, SOLUTION INTRAVENOUS; SUBCUTANEOUS at 21:26

## 2018-03-07 RX ADMIN — HYDRALAZINE HYDROCHLORIDE 50 MG: 50 TABLET ORAL at 08:46

## 2018-03-07 RX ADMIN — METOPROLOL TARTRATE 100 MG: 50 TABLET ORAL at 08:15

## 2018-03-07 RX ADMIN — ONDANSETRON 4 MG: 2 INJECTION INTRAMUSCULAR; INTRAVENOUS at 00:17

## 2018-03-07 RX ADMIN — HYDRALAZINE HYDROCHLORIDE 10 MG: 20 INJECTION INTRAMUSCULAR; INTRAVENOUS at 00:54

## 2018-03-07 RX ADMIN — CLONIDINE HYDROCHLORIDE 0.1 MG: 0.1 TABLET ORAL at 08:16

## 2018-03-07 RX ADMIN — INSULIN GLARGINE 15 UNITS: 100 INJECTION, SOLUTION SUBCUTANEOUS at 21:27

## 2018-03-07 RX ADMIN — INSULIN LISPRO 6 UNITS: 100 INJECTION, SOLUTION INTRAVENOUS; SUBCUTANEOUS at 17:58

## 2018-03-07 RX ADMIN — NIFEDIPINE 60 MG: 60 TABLET, FILM COATED, EXTENDED RELEASE ORAL at 08:15

## 2018-03-07 RX ADMIN — CLONIDINE HYDROCHLORIDE 0.1 MG: 0.1 TABLET ORAL at 21:24

## 2018-03-07 RX ADMIN — SODIUM POLYSTYRENE SULFONATE 15 G: 15 SUSPENSION ORAL; RECTAL at 12:09

## 2018-03-07 RX ADMIN — VALSARTAN 320 MG: 160 TABLET ORAL at 08:15

## 2018-03-07 RX ADMIN — SIMVASTATIN 40 MG: 40 TABLET, FILM COATED ORAL at 08:15

## 2018-03-07 RX ADMIN — ROPINIROLE HYDROCHLORIDE 2 MG: 2 TABLET, FILM COATED ORAL at 21:24

## 2018-03-07 RX ADMIN — HYDRALAZINE HYDROCHLORIDE 50 MG: 50 TABLET ORAL at 14:11

## 2018-03-07 RX ADMIN — Medication 10 ML: at 21:25

## 2018-03-07 RX ADMIN — LEVOTHYROXINE SODIUM 150 MCG: 75 TABLET ORAL at 06:15

## 2018-03-07 RX ADMIN — METOPROLOL TARTRATE 100 MG: 50 TABLET ORAL at 21:24

## 2018-03-07 RX ADMIN — Medication 10 ML: at 08:45

## 2018-03-07 RX ADMIN — HYDRALAZINE HYDROCHLORIDE 50 MG: 50 TABLET ORAL at 21:24

## 2018-03-07 RX ADMIN — INSULIN LISPRO 12 UNITS: 100 INJECTION, SOLUTION INTRAVENOUS; SUBCUTANEOUS at 08:16

## 2018-03-07 RX ADMIN — DULOXETINE HYDROCHLORIDE 60 MG: 60 CAPSULE, DELAYED RELEASE ORAL at 08:16

## 2018-03-07 ASSESSMENT — PAIN SCALES - GENERAL
PAINLEVEL_OUTOF10: 0
PAINLEVEL_OUTOF10: 0

## 2018-03-07 NOTE — PROGRESS NOTES
Physical Therapy    Facility/Department: [facility] 3 PARUL/VAS/MED  Initial Assessment    NAME: Trevor Perla  : 1970  MRN: [id number]    Date of Service: 3/7/2018    Patient Diagnosis(es): The primary encounter diagnosis was Acute respiratory failure with hypoxia ([facility] [address] Diagnoses of Flash pulmonary edema ([facility] [address] Hypertensive emergency, and ESRD on hemodialysis [facility] were also pertinent to this visit. has a past medical history of Arthritis; Chronic kidney disease, stage 5, kidney failure ([facility] [address] DM (diabetes mellitus) type II controlled with renal manifestation ([facility] [address] Gastroparesis; GERD (gastroesophageal reflux disease); Hypertension; Hypothyroid; and Restless leg syndrome. has a past surgical history that includes Gallbladder surgery; Tubal ligation; pr egd transoral biopsy single/multiple (N/A, 2016); Breast surgery; Cholecystectomy; Colonoscopy; vascular surgery (Left, ); Endometrial ablation (); vascular surgery (10/02/2017); pr colonoscopy w/biopsy single/multiple (N/A, 10/20/2017); and pr egd transoral biopsy single/multiple (N/A, 10/20/2017). Restrictions  Restrictions/Precautions  Restrictions/Precautions: Fall Risk  Required Braces or Orthoses?: No  Position Activity Restriction  Other position/activity restrictions: HD MWF   Vision/Hearing        Subjective  General  Chart Reviewed: Yes  Patient assessed for rehabilitation services?: Yes  Additional Pertinent Hx: pt. with chest pain and shortness of breath  Response To Previous Treatment: Not applicable  Family / Caregiver Present: No  Referring Practitioner: Dr. Eli Bruce  Referral Date : 18  Diagnosis: Acute Respiratory Failure with hypoxia, flash pulmonary edema, hypertensive emergency, ESRD on HD, volume overload, hyperglycemia, hyponatremia  Follows Commands: Within Functional Limits  General Comment  Comments: RNDannie PT. Subjective  Subjective: Pt. willing to get up and walk.   Pain Screening  Patient Currently in

## 2018-03-07 NOTE — PROGRESS NOTES
edema bilaterally. Full range of motion without deformity. Skin: Skin color, texture, turgor normal.  No rashes or lesions. Neurologic:  Neurovascularly intact without any focal sensory/motor deficits. Cranial nerves: II-XII intact, grossly non-focal.  Generalized weakness. Psychiatric: Alert and oriented, thought content appropriate, normal insight        Medications:  Scheduled Meds:   insulin lispro  0-12 Units Subcutaneous TID WC    insulin lispro  0-6 Units Subcutaneous Nightly    hydrALAZINE  50 mg Oral TID    cloNIDine  0.1 mg Oral BID    DULoxetine  60 mg Oral Daily    isosorbide mononitrate  120 mg Oral Daily    levothyroxine  150 mcg Oral Daily    metoprolol  100 mg Oral BID    NIFEdipine  60 mg Oral Daily    rOPINIRole  2 mg Oral Nightly    simvastatin  40 mg Oral Daily    valsartan  320 mg Oral Daily    sodium chloride flush  10 mL Intravenous 2 times per day         PRN Meds:  hydrALAZINE, glucose, dextrose, glucagon (rDNA), dextrose, acetaminophen, nitroGLYCERIN, sodium chloride flush, ondansetron      Results:  CBC:   Recent Labs      03/05/18   1131  03/06/18   0138   WBC  8.6  9.6   HGB  11.1*  10.3*   HCT  34.9*  30.9*   MCV  95.1  90.9   PLT  173  139     BMP:   Recent Labs      03/05/18   1130  03/05/18   1201  03/06/18   0138  03/07/18   0527   NA  114*   --   136  129*   K  5.5*  6.1  3.9  5.5*   CL  72*   --   93*  89*   CO2  21*   --   28  22   BUN  55*   --   29*  33*   CREATININE  5.2*   --   3.8*  4.5*     LIVER PROFILE:   Recent Labs      03/05/18   1130  03/06/18   0138  03/07/18   0527   AST  31  19  27   ALT  24  17  17   LIPASE  80*   --    --    BILITOT  0.6  0.4  0.4   ALKPHOS  129*  90  85     PT/INR:   Recent Labs      03/05/18   1130   PROTIME  13.6   INR  1.05     APTT: No results for input(s): APTT in the last 72 hours.   UA:  Recent Labs      03/05/18   1350   COLORU  Yellow   PHUR  7.0   WBCUA  0-1   RBCUA  3-5*   BACTERIA  trace   CLARITYU  Clear   SPECGRAV

## 2018-03-07 NOTE — PROGRESS NOTES
APTT: No results for input(s): APTT in the last 72 hours. BNP:  No results for input(s): BNP in the last 72 hours. Ionized Calcium:No results for input(s): IONCA in the last 72 hours. Magnesium:No results for input(s): MG in the last 72 hours. Phosphorus:No results for input(s): PHOS in the last 72 hours. HgbA1C:   Recent Labs      03/05/18   1130  03/06/18   0138   LABA1C  10.3*  10.1*     Hepatic:   Recent Labs      03/05/18   1130  03/06/18   0138  03/07/18   0527   ALKPHOS  129*  90  85   ALT  24  17  17   AST  31  19  27   PROT  7.8  6.2*  6.2*   BILITOT  0.6  0.4  0.4   LABALBU  4.3  3.4*  3.0*     Lactic Acid:   Recent Labs      03/05/18   1130   LACTA  1.3     Troponin: No results for input(s): CKTOTAL, CKMB, TROPONINT in the last 72 hours. ABGs: No results for input(s): PH, PCO2, PO2, HCO3, O2SAT in the last 72 hours. CRP:  No results for input(s): CRP in the last 72 hours. Sed Rate:  No results for input(s): SEDRATE in the last 72 hours. Cultures:   No results for input(s): CULTURE in the last 72 hours. No results for input(s): BC, Loura Lasso in the last 72 hours. No results for input(s): CXSURG in the last 72 hours. Radiology reports as per the Radiologist  Radiology: Xr Chest Portable    Result Date: 3/6/2018  Exam:   XR CHEST PORTABLE  Date:  3/6/2018 History:  Female, age  51 years; pulmonary edema COMPARISON:  Chest x-ray dated 3/5/2018. Findings : Myocardial megaly. Findings of fluid overload have decreased with now interstitial edema. No pleural effusion. No measurable pneumothorax The bones show no acute pathology. Impression: Decreasing findings of fluid overload. Signed by Dr Jayda Ho on 3/6/2018 7:23 AM    Xr Chest Portable    Result Date: 3/5/2018  Exam:   XR CHEST PORTABLE  Date:  3/5/2018 History:  Female, age  51 years; shortness of breath COMPARISON:  Chest x-ray dated 11/26/2017. Findings : Moderate cardiomegaly.  Bilateral central dense opacities, confluent, most concerning for pulmonary edema in this clinical setting. . No definite pleural effusion. No measurable pneumothorax. The bones show no acute pathology. Impression: Findings of fluid overload. Signed by Dr Daniele Khalil on 3/5/2018 11:10 AM       Assessment   1. End-stage renal disease. 2. Hyponatremia  3. Severe volume overload/pulmonary edema now resolving. 4. Severe hyperglycemia now resolving. 5. Diabetic nephropathy. 6. Insulin-dependent type II diabetes. 7. Severe systolic and diastolic hypertension  8. Hyperkalemia    Plan:  1. Routine hemodialysis treatment in the morning. 2. Counseling about fluid restriction in between dialysis. 3. Kayexalate 15 g by mouth today.           Santiago Wood MD  03/07/18  8:53 AM

## 2018-03-07 NOTE — PLAN OF CARE
Problem: Discharge Planning:  Goal: Participates in care planning  Participates in care planning   Outcome: Ongoing    Goal: Discharged to appropriate level of care  Discharged to appropriate level of care   Outcome: Ongoing      Problem: Anxiety/Stress:  Goal: Level of anxiety will decrease  Level of anxiety will decrease   Outcome: Ongoing      Problem: Aspiration:  Goal: Absence of aspiration  Absence of aspiration   Outcome: Ongoing      Problem: Fluid Volume - Imbalance:  Goal: Absence of imbalanced fluid volume signs and symptoms  Absence of imbalanced fluid volume signs and symptoms   Outcome: Ongoing      Problem: Mental Status - Impaired:  Goal: Mental status will be restored to baseline  Mental status will be restored to baseline   Outcome: Ongoing      Problem: Nutrition Deficit:  Goal: Ability to achieve adequate nutritional intake will improve  Ability to achieve adequate nutritional intake will improve   Outcome: Ongoing      Problem: Pain:  Goal: Pain level will decrease  Pain level will decrease   Outcome: Ongoing    Goal: Recognizes and communicates pain  Recognizes and communicates pain   Outcome: Ongoing    Goal: Control of acute pain  Control of acute pain   Outcome: Ongoing    Goal: Control of chronic pain  Control of chronic pain   Outcome: Ongoing      Problem: Serum Glucose Level - Abnormal:  Goal: Ability to maintain appropriate glucose levels will improve to within specified parameters  Ability to maintain appropriate glucose levels will improve to within specified parameters   Outcome: Ongoing      Problem: Skin Integrity - Impaired:  Goal: Will show no infection signs and symptoms  Will show no infection signs and symptoms   Outcome: Ongoing    Goal: Absence of new skin breakdown  Absence of new skin breakdown   Outcome: Ongoing      Problem: Risk for Impaired Skin Integrity  Goal: Tissue integrity - skin and mucous membranes  Structural intactness and normal physiological function of skin and  mucous membranes.    Outcome: Ongoing      Problem: Falls - Risk of  Goal: Absence of falls  Outcome: Ongoing

## 2018-03-07 NOTE — PLAN OF CARE
Problem: Discharge Planning:  Goal: Participates in care planning  Participates in care planning   Outcome: Ongoing    Goal: Discharged to appropriate level of care  Discharged to appropriate level of care   Outcome: Ongoing      Problem: Anxiety/Stress:  Goal: Level of anxiety will decrease  Level of anxiety will decrease   Outcome: Ongoing      Problem: Aspiration:  Goal: Absence of aspiration  Absence of aspiration   Outcome: Ongoing      Problem: Fluid Volume - Imbalance:  Goal: Absence of imbalanced fluid volume signs and symptoms  Absence of imbalanced fluid volume signs and symptoms   Outcome: Ongoing      Problem: Mental Status - Impaired:  Goal: Mental status will be restored to baseline  Mental status will be restored to baseline   Outcome: Ongoing      Problem: Nutrition Deficit:  Goal: Ability to achieve adequate nutritional intake will improve  Ability to achieve adequate nutritional intake will improve   Outcome: Ongoing      Problem: Pain:  Goal: Pain level will decrease  Pain level will decrease   Outcome: Ongoing    Goal: Recognizes and communicates pain  Recognizes and communicates pain   Outcome: Ongoing    Goal: Control of acute pain  Control of acute pain   Outcome: Ongoing    Goal: Control of chronic pain  Control of chronic pain   Outcome: Ongoing      Problem: Serum Glucose Level - Abnormal:  Goal: Ability to maintain appropriate glucose levels will improve to within specified parameters  Ability to maintain appropriate glucose levels will improve to within specified parameters   Outcome: Ongoing      Problem: Skin Integrity - Impaired:  Goal: Will show no infection signs and symptoms  Will show no infection signs and symptoms   Outcome: Ongoing    Goal: Absence of new skin breakdown  Absence of new skin breakdown   Outcome: Ongoing      Problem: Risk for Impaired Skin Integrity  Goal: Tissue integrity - skin and mucous membranes  Structural intactness and normal physiological function of

## 2018-03-07 NOTE — PROGRESS NOTES
Occupational Therapy   Occupational Therapy Initial Assessment  Date: 3/7/2018   Patient Name: Prince Drake  MRN: [id number]     : 1970    Patient Diagnosis(es): The primary encounter diagnosis was Acute respiratory failure with hypoxia ([facility] [address] Diagnoses of Flash pulmonary edema ([facility] [address] Hypertensive emergency, and ESRD on hemodialysis [facility]) were also pertinent to this visit. has a past medical history of Arthritis; Chronic kidney disease, stage 5, kidney failure ([facility] [address] DM (diabetes mellitus) type II controlled with renal manifestation ([facility] [address] Gastroparesis; GERD (gastroesophageal reflux disease); Hypertension; Hypothyroid; and Restless leg syndrome. has a past surgical history that includes Gallbladder surgery; Tubal ligation; pr egd transoral biopsy single/multiple (N/A, 2016); Breast surgery; Cholecystectomy; Colonoscopy; vascular surgery (Left, ); Endometrial ablation (); vascular surgery (10/02/2017); pr colonoscopy w/biopsy single/multiple (N/A, 10/20/2017); and pr egd transoral biopsy single/multiple (N/A, 10/20/2017).            Restrictions  Restrictions/Precautions  Restrictions/Precautions: Fall Risk  Position Activity Restriction  Other position/activity restrictions: HD MWF     Subjective   General  Patient assessed for rehabilitation services?: Yes  Additional Pertinent Hx: ED for SOB and severe pulmonary edema   Referring Practitioner: Dr. Alex Hidalgo   Diagnosis: Respiratory Failure   Subjective  Subjective: Pt pleasant and cooperative throughout evaluation   Pain Assessment  Patient Currently in Pain: No  Pain Assessment: 0-10  Pain Level: 0  Pain Intervention(s): Medication (see eMar), Repositioned, Ambulation/Increased activity  Pre Treatment Pain Screening  Pain at present: 0  Scale Used: Numeric Score  Social/Functional History  Social/Functional History  Lives With: Significant other  Type of Home: Trailer  Home Layout: One level  Home Access: Stairs to enter with rails  Entrance

## 2018-03-07 NOTE — PROGRESS NOTES
motion without deformity. Skin: Skin color, texture, turgor normal.  No rashes or lesions. Neurologic:  Neurovascularly intact without any focal sensory/motor deficits. Cranial nerves: II-XII intact, grossly non-focal.  Generalized weakness. Psychiatric: Alert and oriented, thought content appropriate, normal insight        Medications:  Scheduled Meds:   insulin lispro  0-12 Units Subcutaneous TID WC    insulin lispro  0-6 Units Subcutaneous Nightly    hydrALAZINE  50 mg Oral TID    cloNIDine  0.1 mg Oral BID    DULoxetine  60 mg Oral Daily    isosorbide mononitrate  120 mg Oral Daily    levothyroxine  150 mcg Oral Daily    metoprolol  100 mg Oral BID    NIFEdipine  60 mg Oral Daily    rOPINIRole  2 mg Oral Nightly    simvastatin  40 mg Oral Daily    valsartan  320 mg Oral Daily    sodium chloride flush  10 mL Intravenous 2 times per day         PRN Meds:  hydrALAZINE, glucose, dextrose, glucagon (rDNA), dextrose, acetaminophen, nitroGLYCERIN, sodium chloride flush, ondansetron      Results:  CBC:   Recent Labs      03/05/18   1131  03/06/18   0138  03/07/18   0633   WBC  8.6  9.6  6.2   HGB  11.1*  10.3*  9.7*   HCT  34.9*  30.9*  30.1*   MCV  95.1  90.9  94.4   PLT  173  139  130     BMP:   Recent Labs      03/05/18   1130  03/05/18   1201  03/06/18   0138  03/07/18   0527   NA  114*   --   136  129*   K  5.5*  6.1  3.9  5.5*   CL  72*   --   93*  89*   CO2  21*   --   28  22   BUN  55*   --   29*  33*   CREATININE  5.2*   --   3.8*  4.5*     LIVER PROFILE:   Recent Labs      03/05/18   1130  03/06/18   0138  03/07/18   0527   AST  31  19  27   ALT  24  17  17   LIPASE  80*   --    --    BILITOT  0.6  0.4  0.4   ALKPHOS  129*  90  85     PT/INR:   Recent Labs      03/05/18   1130   PROTIME  13.6   INR  1.05     APTT: No results for input(s): APTT in the last 72 hours.   UA:  Recent Labs      03/05/18   1350   COLORU  Yellow   PHUR  7.0   WBCUA  0-1   RBCUA  3-5*   BACTERIA  trace   CLARITYU  Clear

## 2018-03-08 VITALS
DIASTOLIC BLOOD PRESSURE: 80 MMHG | TEMPERATURE: 98.1 F | OXYGEN SATURATION: 95 % | BODY MASS INDEX: 26.03 KG/M2 | RESPIRATION RATE: 17 BRPM | WEIGHT: 162 LBS | SYSTOLIC BLOOD PRESSURE: 175 MMHG | HEART RATE: 63 BPM | HEIGHT: 66 IN

## 2018-03-08 PROBLEM — E87.1 HYPONATREMIA: Status: ACTIVE | Noted: 2018-03-08

## 2018-03-08 LAB
ALBUMIN SERPL-MCNC: 3 G/DL (ref 3.5–5.2)
ALP BLD-CCNC: 77 U/L (ref 35–104)
ALT SERPL-CCNC: 14 U/L (ref 5–33)
ANION GAP SERPL CALCULATED.3IONS-SCNC: 16 MMOL/L (ref 7–19)
AST SERPL-CCNC: 19 U/L (ref 5–32)
BILIRUB SERPL-MCNC: 0.3 MG/DL (ref 0.2–1.2)
BUN BLDV-MCNC: 51 MG/DL (ref 6–20)
CALCIUM SERPL-MCNC: 8.8 MG/DL (ref 8.6–10)
CHLORIDE BLD-SCNC: 89 MMOL/L (ref 98–111)
CO2: 26 MMOL/L (ref 22–29)
CREAT SERPL-MCNC: 6.1 MG/DL (ref 0.5–0.9)
GFR NON-AFRICAN AMERICAN: 7
GLUCOSE BLD-MCNC: 141 MG/DL (ref 70–99)
GLUCOSE BLD-MCNC: 151 MG/DL (ref 74–109)
GLUCOSE BLD-MCNC: 155 MG/DL (ref 70–99)
HCT VFR BLD CALC: 29 % (ref 37–47)
HEMOGLOBIN: 9.5 G/DL (ref 12–16)
MCH RBC QN AUTO: 30.6 PG (ref 27–31)
MCHC RBC AUTO-ENTMCNC: 32.8 G/DL (ref 33–37)
MCV RBC AUTO: 93.5 FL (ref 81–99)
PDW BLD-RTO: 13.2 % (ref 11.5–14.5)
PERFORMED ON: ABNORMAL
PERFORMED ON: ABNORMAL
PLATELET # BLD: 135 K/UL (ref 130–400)
PMV BLD AUTO: 9.8 FL (ref 9.4–12.3)
POTASSIUM SERPL-SCNC: 4.2 MMOL/L (ref 3.5–5)
RBC # BLD: 3.1 M/UL (ref 4.2–5.4)
SODIUM BLD-SCNC: 131 MMOL/L (ref 136–145)
TOTAL PROTEIN: 5.7 G/DL (ref 6.6–8.7)
WBC # BLD: 5.8 K/UL (ref 4.8–10.8)

## 2018-03-08 PROCEDURE — 85027 COMPLETE CBC AUTOMATED: CPT

## 2018-03-08 PROCEDURE — 8010000000 HC HEMODIALYSIS ACUTE INPT

## 2018-03-08 PROCEDURE — APPSS45 APP SPLIT SHARED TIME 31-45 MINUTES: Performed by: NURSE PRACTITIONER

## 2018-03-08 PROCEDURE — 6370000000 HC RX 637 (ALT 250 FOR IP): Performed by: INTERNAL MEDICINE

## 2018-03-08 PROCEDURE — 80053 COMPREHEN METABOLIC PANEL: CPT

## 2018-03-08 PROCEDURE — 36415 COLL VENOUS BLD VENIPUNCTURE: CPT

## 2018-03-08 PROCEDURE — 82948 REAGENT STRIP/BLOOD GLUCOSE: CPT

## 2018-03-08 PROCEDURE — 2580000003 HC RX 258: Performed by: INTERNAL MEDICINE

## 2018-03-08 PROCEDURE — 99239 HOSP IP/OBS DSCHRG MGMT >30: CPT | Performed by: INTERNAL MEDICINE

## 2018-03-08 RX ADMIN — HYDRALAZINE HYDROCHLORIDE 50 MG: 50 TABLET ORAL at 14:14

## 2018-03-08 RX ADMIN — LEVOTHYROXINE SODIUM 150 MCG: 75 TABLET ORAL at 05:55

## 2018-03-08 RX ADMIN — METOPROLOL TARTRATE 100 MG: 50 TABLET ORAL at 14:14

## 2018-03-08 RX ADMIN — NIFEDIPINE 60 MG: 60 TABLET, FILM COATED, EXTENDED RELEASE ORAL at 14:13

## 2018-03-08 RX ADMIN — ISOSORBIDE MONONITRATE 120 MG: 60 TABLET, EXTENDED RELEASE ORAL at 14:13

## 2018-03-08 RX ADMIN — SIMVASTATIN 40 MG: 40 TABLET, FILM COATED ORAL at 14:14

## 2018-03-08 RX ADMIN — Medication 10 ML: at 14:14

## 2018-03-08 RX ADMIN — VALSARTAN 320 MG: 160 TABLET ORAL at 14:14

## 2018-03-08 RX ADMIN — DULOXETINE HYDROCHLORIDE 60 MG: 60 CAPSULE, DELAYED RELEASE ORAL at 14:14

## 2018-03-08 RX ADMIN — CLONIDINE HYDROCHLORIDE 0.1 MG: 0.1 TABLET ORAL at 14:14

## 2018-03-08 ASSESSMENT — PAIN SCALES - GENERAL: PAINLEVEL_OUTOF10: 0

## 2018-03-08 NOTE — CARE COORDINATION
Pt home alone; has family support.    Electronically signed by RUBEN Franklin on 3/8/2018 at 9:14 AM

## 2018-03-08 NOTE — PROGRESS NOTES
 Arthritis     Chronic kidney disease, stage 5, kidney failure (HCC)     DM (diabetes mellitus) type II controlled with renal manifestation (HCC) 1993    Gastroparesis     GERD (gastroesophageal reflux disease)     Hypertension     Hypothyroid     Restless leg syndrome        Past Surgical History:  Past Surgical History:   Procedure Laterality Date    BREAST SURGERY      infected milk gland removed    CHOLECYSTECTOMY      COLONOSCOPY      ENDOMETRIAL ABLATION      GALLBLADDER SURGERY      [place] COLONOSCOPY W/BIOPSY SINGLE/MULTIPLE N/A 10/20/2017    Dr Brooklyn Irvin prep-Tubular AP (-) dysplasia x 2--3 yr recall    [place] EGD TRANSORAL BIOPSY SINGLE/MULTIPLE N/A 2016    Dr Emi Harry EGD TRANSORAL BIOPSY SINGLE/MULTIPLE N/A 10/20/2017    Dr Marie Vera (-)    TUBAL LIGATION      VASCULAR SURGERY Left 2016    fistula by Dr Prasad Talavera at [address]  10/02/2017    [facility]. Left upper fistulograms/venograms, balloon angioplasty cephalic vein arch 43D45 conquest.       Family History  Family History   Problem Relation Age of Onset    Colon Cancer Mother       at 63    Colon Polyps Mother     Lung Cancer Father       at 66    Colon Polyps Father     Stomach Cancer Sister     Breast Cancer Sister     Depression Daughter 25     committed suicide    Liver Cancer Neg Hx     Liver Disease Neg Hx     Esophageal Cancer Neg Hx     Rectal Cancer Neg Hx        Social History  Social History     Social History    Marital status:      Spouse name: N/A    Number of children: 2    Years of education: N/A     Occupational History    Not on file.      Social History Main Topics    Smoking status: Current Every Day Smoker     Packs/day: 0.50     Years: 25.00     Types: Cigarettes     Last attempt to quit: 2017    Smokeless tobacco: Never Used    Alcohol use No    Drug use: No    Sexual activity: Not on file     Other Topics Concern    Not on file     Social History Narrative    No narrative on file         Review of Systems:  History obtained from chart review and the patient  General ROS: No fever or chills  Respiratory ROS: positive for - shortness of breath  Cardiovascular ROS: positive for - shortness of breath  Gastrointestinal ROS: No abdominal pain or melena  Genito-Urinary ROS: No dysuria or hematuria  Musculoskeletal ROS: No joint pain or swelling   14 point ROS reviewed with the patient and negative except as noted above and in the HPI unless unable to obtain. Objective:  Blood pressure (!) 175/79, pulse 65, temperature 98.2 °F (36.8 °C), temperature source Temporal, resp. rate 16, height 5' 6\" (1.676 m), weight 162 lb (73.5 kg), SpO2 94 %.     Intake/Output Summary (Last 24 hours) at 03/08/18 0840  Last data filed at 03/07/18 1637   Gross per 24 hour   Intake             1680 ml   Output                0 ml   Net             1680 ml     General: awake/alert   HEENT: Normocephalic atraumatic head  Neck: Supple with no JVD or carotid bruits  Chest:  decreased breath sounds noted- paloma  CVS: regular rate and rhythm  Abdominal: soft, nontender, normal bowel sounds  Extremities: no cyanosis or edema  Skin: warm and dry without rash      Labs:  BMP:   Recent Labs      03/06/18   0138 03/07/18   0527 03/08/18   0452   NA  136  129*  131*   K  3.9  5.5*  4.2   CL  93*  89*  89*   CO2  28  22  26   BUN  29*  33*  51*   CREATININE  3.8*  4.5*  6.1*   CALCIUM  9.0  8.8  8.8     CBC:   Recent Labs      03/06/18 0138 03/07/18   0633  03/08/18   0452   WBC  9.6  6.2  5.8   HGB  10.3*  9.7*  9.5*   HCT  30.9*  30.1*  29.0*   MCV  90.9  94.4  93.5   PLT  139  130  135     LIVER PROFILE:   Recent Labs      03/05/18   1130  03/06/18 0138 03/07/18   0527  03/08/18   0452   AST  31  19  27  19   ALT  24  17  17  14   LIPASE  80*   --    --    --    BILITOT  0.6  0.4  0.4  0.3   ALKPHOS  129*  90  85  77     PT/INR:   Recent Labs      03/05/18   1130   PROTIME  13.6

## 2018-03-08 NOTE — DISCHARGE SUMMARY
Discharge Summary    Patient ID: Jefm Goes      Patient's PCP: ROMINA Stephenson    Admit Date: 3/5/2018     Discharge Date:   03/08/2018    Admitting Physician: Gabe Mosquera MD     Discharge Physician: Dr. Contreras Hem    Consultants:   1. Dr. Santiago Wood, Nephrology    Discharge Diagnoses:    1. Acute Respiratory Failure with Hypoxia and Hypercapnia - Resolved  2. Fluid Overload 2' to Non-Compliance with Hemodialysis  3. Hyponatremia 2' to Fluid Overload  4. ESRD on Hemodialysis  5. DM, Type 2    6. Hypertensive Urgency with Underlying Essential HTN  7. GERD  8. Acquired Hypothyroidism  9. Gastroparesis  10. Anemia of Chronic Disease       Hospital Course:   Ms. Fabiana Weldon is a 51year old diabetic female with ESRD on hemodialysis who presented to the ER with worsening shortness of breath. She was found to be hypoxic, along with hypertensive urgency. She was started on nicardipine drip, admitted to the unit under the hospitalist service. Nephrology was consulted for management of hemodialysis. Condition improved after dialysis, along with hyponatremia. She was transferred to regular floor on 03/06/2018. No further complications, felt stable for discharge on 03/08/2018 with continuation of regularly scheduled hemodialysis. Exam:   Vital Signs: BP (!) 181/84   Pulse 69   Temp 98.9 °F (37.2 °C) (Temporal)   Resp 16   Ht 5' 6\" (1.676 m)   Wt 162 lb (73.5 kg)   SpO2 95%   BMI 26.15 kg/m²   General appearance: No apparent distress, appears stated age and cooperative. HEENT: Normocephalic. Atraumatic. MONE. Neck: Supple. No JVD. Respiratory:  Normal respiratory effort. Clear to auscultation bilaterally without rales, wheezes, or rhonchi. Cardiovascular: Regular rate and rhythm with normal heart tones without murmurs, rubs or gallops. Abdomen: Soft, non-tender, non-distended with normal bowel sounds. Extremities: No clubbing, cyanosis or edema bilaterally.   Full range of motion without deformity. Neurologic:  Grossly intact. Psychiatric: Alert and oriented, thought content appropriate, normal insight. Activity: Activity as tolerated    Diet: Renal    Labs:  For convenience and continuity at follow-up the following most recent labs are provided:    CBC:   Recent Labs      03/06/18 0138 03/07/18 0633 03/08/18   0452   WBC  9.6  6.2  5.8   HGB  10.3*  9.7*  9.5*   HCT  30.9*  30.1*  29.0*   MCV  90.9  94.4  93.5   PLT  139  130  135     BMP:    Recent Labs      03/06/18 0138 03/07/18   0527 03/08/18   0452   NA  136  129*  131*   K  3.9  5.5*  4.2   CL  93*  89*  89*   CO2  28  22  26   BUN  29*  33*  51*   CREATININE  3.8*  4.5*  6.1*     LIVER PROFILE:   Recent Labs      03/06/18 0138 03/07/18 0527 03/08/18   0452   AST  19  27  19   ALT  17  17  14   BILITOT  0.4  0.4  0.3   ALKPHOS  90  85  77           Discharge Medications:     Current Discharge Medication List           Details   cloNIDine (CATAPRES) 0.1 MG tablet Take 0.1 mg by mouth      senna (SENOKOT) 8.6 MG TABS tablet Take by mouth      levothyroxine (SYNTHROID) 150 MCG tablet Take 150 mcg by mouth Daily      valsartan (DIOVAN) 320 MG tablet Take 320 mg by mouth daily      metoprolol (LOPRESSOR) 100 MG tablet Take 100 mg by mouth 2 times daily      NIFEdipine (ADALAT CC) 60 MG extended release tablet Take 60 mg by mouth daily      isosorbide mononitrate (IMDUR) 120 MG extended release tablet Take 1 tablet by mouth daily  Qty: 30 tablet, Refills: 0      hydrALAZINE (APRESOLINE) 100 MG tablet Take 1 tablet by mouth 3 times daily  Qty: 90 tablet, Refills: 0      insulin detemir (LEVEMIR FLEXTOUCH) 100 UNIT/ML injection pen Inject 5 Units into the skin nightly  Qty: 5 Pen, Refills: 3      ondansetron (ZOFRAN) 4 MG tablet Take 4 mg by mouth every 8 hours as needed for Nausea or Vomiting      DULoxetine (CYMBALTA) 60 MG extended release capsule Take 60 mg by mouth daily      rOPINIRole (REQUIP) 2 MG tablet Take 2 mg by mouth nightly      docusate sodium (STOOL SOFTENER) 100 MG capsule Take 300 mg by mouth nightly       simvastatin (ZOCOR) 40 MG tablet Take 40 mg by mouth daily      Cholecalciferol (VITAMIN D3) 13052 UNITS CAPS Take 1 capsule by mouth once a week On Mondays      insulin aspart (NOVOLOG FLEXPEN) 100 UNIT/ML injection pen Inject 5 Units into the skin 3 times daily               Disposition:  Home    Discharge Instructions/Follow-up:    1. Hospital Follow-up with ROMINA Kam in 1 week. Time Spent on discharge is 32 minutes in the examination, evaluation, counseling and review of medications and discharge plan. ROMINA George    Thank you ROMINA Rhodes for the opportunity to be involved in this patient's care. If you have any questions or concerns please feel free to contact me at [phone]. PHYSICIAN ADDENDUM  Pt seen and examined. Agree with above note as written. Discharge today is appropriate.  Instructed pt to take an additional 0.1 mg clonidine if systolic BP >887 up to twice a day     Electronically signed by Raj Carlson MD on 3/8/18 at 5:43 PM      Electronically signed by Raj Carlson MD on 3/8/18 at 5:43 PM

## 2018-03-09 LAB
EKG P AXIS: 68 DEGREES
EKG P-R INTERVAL: 190 MS
EKG Q-T INTERVAL: 386 MS
EKG QRS DURATION: 100 MS
EKG QTC CALCULATION (BAZETT): 429 MS
EKG T AXIS: 66 DEGREES

## 2018-04-06 ENCOUNTER — OFFICE VISIT (OUTPATIENT)
Dept: GASTROENTEROLOGY | Age: 48
End: 2018-04-06
Payer: MEDICARE

## 2018-04-06 VITALS
WEIGHT: 163 LBS | OXYGEN SATURATION: 96 % | HEIGHT: 66 IN | DIASTOLIC BLOOD PRESSURE: 60 MMHG | HEART RATE: 65 BPM | SYSTOLIC BLOOD PRESSURE: 126 MMHG | BODY MASS INDEX: 26.2 KG/M2

## 2018-04-06 DIAGNOSIS — K59.00 CONSTIPATION, UNSPECIFIED CONSTIPATION TYPE: Primary | ICD-10-CM

## 2018-04-06 DIAGNOSIS — R11.2 NAUSEA AND VOMITING, INTRACTABILITY OF VOMITING NOT SPECIFIED, UNSPECIFIED VOMITING TYPE: ICD-10-CM

## 2018-04-06 DIAGNOSIS — R10.32 LLQ ABDOMINAL PAIN: ICD-10-CM

## 2018-04-06 DIAGNOSIS — K31.84 GASTROPARESIS: ICD-10-CM

## 2018-04-06 PROCEDURE — 99213 OFFICE O/P EST LOW 20 MIN: CPT | Performed by: NURSE PRACTITIONER

## 2018-04-06 PROCEDURE — G8427 DOCREV CUR MEDS BY ELIG CLIN: HCPCS | Performed by: NURSE PRACTITIONER

## 2018-04-06 PROCEDURE — 4004F PT TOBACCO SCREEN RCVD TLK: CPT | Performed by: NURSE PRACTITIONER

## 2018-04-06 PROCEDURE — G8419 CALC BMI OUT NRM PARAM NOF/U: HCPCS | Performed by: NURSE PRACTITIONER

## 2018-04-06 PROCEDURE — 1111F DSCHRG MED/CURRENT MED MERGE: CPT | Performed by: NURSE PRACTITIONER

## 2018-04-06 RX ORDER — METOCLOPRAMIDE 5 MG/1
5 TABLET ORAL 3 TIMES DAILY
Qty: 120 TABLET | Refills: 4 | Status: SHIPPED | OUTPATIENT
Start: 2018-04-06 | End: 2018-12-19 | Stop reason: ALTCHOICE

## 2018-04-11 ASSESSMENT — ENCOUNTER SYMPTOMS
ABDOMINAL DISTENTION: 0
TROUBLE SWALLOWING: 0
BLOOD IN STOOL: 0
COUGH: 0
ABDOMINAL PAIN: 1
RECTAL PAIN: 0
CHOKING: 0
CONSTIPATION: 1
NAUSEA: 1
SHORTNESS OF BREATH: 0
VOMITING: 1
VOICE CHANGE: 0
DIARRHEA: 0

## 2018-04-17 ENCOUNTER — TELEPHONE (OUTPATIENT)
Dept: GASTROENTEROLOGY | Age: 48
End: 2018-04-17

## 2018-06-01 ENCOUNTER — TELEPHONE (OUTPATIENT)
Dept: VASCULAR SURGERY | Age: 48
End: 2018-06-01

## 2018-06-26 ENCOUNTER — PREP FOR PROCEDURE (OUTPATIENT)
Dept: VASCULAR SURGERY | Age: 48
End: 2018-06-26

## 2018-06-26 RX ORDER — VANCOMYCIN HYDROCHLORIDE 1 G/200ML
1000 INJECTION, SOLUTION INTRAVENOUS
Status: CANCELLED | OUTPATIENT
Start: 2018-06-26 | End: 2018-06-26

## 2018-06-26 RX ORDER — SODIUM CHLORIDE 0.9 % (FLUSH) 0.9 %
10 SYRINGE (ML) INJECTION PRN
Status: CANCELLED | OUTPATIENT
Start: 2018-06-26 | End: 2019-06-26

## 2018-06-26 RX ORDER — SODIUM CHLORIDE 9 MG/ML
INJECTION, SOLUTION INTRAVENOUS CONTINUOUS
Status: CANCELLED | OUTPATIENT
Start: 2018-06-26 | End: 2019-06-26

## 2018-06-26 RX ORDER — CLONIDINE HYDROCHLORIDE 0.1 MG/1
0.1 TABLET ORAL PRN
Status: CANCELLED | OUTPATIENT
Start: 2018-06-26 | End: 2019-06-26

## 2018-06-26 RX ORDER — ASPIRIN 81 MG/1
81 TABLET ORAL ONCE
Status: CANCELLED | OUTPATIENT
Start: 2018-06-26 | End: 2018-06-26

## 2018-06-27 ENCOUNTER — HOSPITAL ENCOUNTER (OUTPATIENT)
Dept: INTERVENTIONAL RADIOLOGY/VASCULAR | Age: 48
Discharge: HOME | End: 2018-06-27
Payer: MEDICARE

## 2018-06-27 VITALS
TEMPERATURE: 97.2 F | WEIGHT: 157 LBS | OXYGEN SATURATION: 95 % | SYSTOLIC BLOOD PRESSURE: 136 MMHG | DIASTOLIC BLOOD PRESSURE: 72 MMHG | HEIGHT: 66 IN | RESPIRATION RATE: 13 BRPM | HEART RATE: 62 BPM | BODY MASS INDEX: 25.23 KG/M2

## 2018-06-27 DIAGNOSIS — N18.6 ESRD ON HEMODIALYSIS (HCC): ICD-10-CM

## 2018-06-27 DIAGNOSIS — T82.590A MALFUNCTION OF ARTERIOVENOUS DIALYSIS FISTULA, INITIAL ENCOUNTER (HCC): ICD-10-CM

## 2018-06-27 DIAGNOSIS — Z99.2 ESRD ON HEMODIALYSIS (HCC): ICD-10-CM

## 2018-06-27 PROCEDURE — 2580000003 HC RX 258: Performed by: NURSE PRACTITIONER

## 2018-06-27 PROCEDURE — 6360000002 HC RX W HCPCS: Performed by: SURGERY

## 2018-06-27 PROCEDURE — 36903 INTRO CATH DIALYSIS CIRCUIT: CPT | Performed by: SURGERY

## 2018-06-27 PROCEDURE — 6360000002 HC RX W HCPCS: Performed by: NURSE PRACTITIONER

## 2018-06-27 PROCEDURE — C1894 INTRO/SHEATH, NON-LASER: HCPCS

## 2018-06-27 PROCEDURE — 99999 PR OFFICE/OUTPT VISIT,PROCEDURE ONLY: CPT | Performed by: SURGERY

## 2018-06-27 PROCEDURE — 99153 MOD SED SAME PHYS/QHP EA: CPT | Performed by: SURGERY

## 2018-06-27 PROCEDURE — 99152 MOD SED SAME PHYS/QHP 5/>YRS: CPT | Performed by: SURGERY

## 2018-06-27 PROCEDURE — 36908 STENT PLMT CTR DIALYSIS SEG: CPT | Performed by: SURGERY

## 2018-06-27 PROCEDURE — 6370000000 HC RX 637 (ALT 250 FOR IP): Performed by: NURSE PRACTITIONER

## 2018-06-27 PROCEDURE — 2500000003 HC RX 250 WO HCPCS: Performed by: SURGERY

## 2018-06-27 PROCEDURE — 6360000004 HC RX CONTRAST MEDICATION: Performed by: SURGERY

## 2018-06-27 RX ORDER — ASPIRIN 81 MG/1
81 TABLET ORAL ONCE
Status: COMPLETED | OUTPATIENT
Start: 2018-06-27 | End: 2018-06-27

## 2018-06-27 RX ORDER — FENTANYL CITRATE 50 UG/ML
INJECTION, SOLUTION INTRAMUSCULAR; INTRAVENOUS
Status: COMPLETED | OUTPATIENT
Start: 2018-06-27 | End: 2018-06-27

## 2018-06-27 RX ORDER — CLONIDINE HYDROCHLORIDE 0.1 MG/1
0.1 TABLET ORAL PRN
Status: DISCONTINUED | OUTPATIENT
Start: 2018-06-27 | End: 2018-06-29 | Stop reason: HOSPADM

## 2018-06-27 RX ORDER — SODIUM CHLORIDE 0.9 % (FLUSH) 0.9 %
10 SYRINGE (ML) INJECTION PRN
Status: DISCONTINUED | OUTPATIENT
Start: 2018-06-27 | End: 2018-06-29 | Stop reason: HOSPADM

## 2018-06-27 RX ORDER — HEPARIN SODIUM 5000 [USP'U]/ML
INJECTION, SOLUTION INTRAVENOUS; SUBCUTANEOUS
Status: COMPLETED | OUTPATIENT
Start: 2018-06-27 | End: 2018-06-27

## 2018-06-27 RX ORDER — ONDANSETRON 2 MG/ML
4 INJECTION INTRAMUSCULAR; INTRAVENOUS EVERY 8 HOURS PRN
Status: DISCONTINUED | OUTPATIENT
Start: 2018-06-27 | End: 2018-06-29 | Stop reason: HOSPADM

## 2018-06-27 RX ORDER — HYDROCODONE BITARTRATE AND ACETAMINOPHEN 5; 325 MG/1; MG/1
2 TABLET ORAL EVERY 4 HOURS PRN
Status: DISCONTINUED | OUTPATIENT
Start: 2018-06-27 | End: 2018-06-29 | Stop reason: HOSPADM

## 2018-06-27 RX ORDER — SODIUM CHLORIDE 9 MG/ML
INJECTION, SOLUTION INTRAVENOUS CONTINUOUS
Status: DISCONTINUED | OUTPATIENT
Start: 2018-06-27 | End: 2018-06-29 | Stop reason: HOSPADM

## 2018-06-27 RX ORDER — LIDOCAINE HYDROCHLORIDE 20 MG/ML
INJECTION, SOLUTION INFILTRATION; PERINEURAL
Status: COMPLETED | OUTPATIENT
Start: 2018-06-27 | End: 2018-06-27

## 2018-06-27 RX ORDER — HYDROCODONE BITARTRATE AND ACETAMINOPHEN 5; 325 MG/1; MG/1
1 TABLET ORAL EVERY 4 HOURS PRN
Status: DISCONTINUED | OUTPATIENT
Start: 2018-06-27 | End: 2018-06-29 | Stop reason: HOSPADM

## 2018-06-27 RX ORDER — VANCOMYCIN HYDROCHLORIDE 1 G/200ML
1000 INJECTION, SOLUTION INTRAVENOUS
Status: COMPLETED | OUTPATIENT
Start: 2018-06-27 | End: 2018-06-27

## 2018-06-27 RX ORDER — MIDAZOLAM HYDROCHLORIDE 1 MG/ML
INJECTION INTRAMUSCULAR; INTRAVENOUS
Status: COMPLETED | OUTPATIENT
Start: 2018-06-27 | End: 2018-06-27

## 2018-06-27 RX ORDER — ACETAMINOPHEN 325 MG/1
650 TABLET ORAL EVERY 4 HOURS PRN
Status: DISCONTINUED | OUTPATIENT
Start: 2018-06-27 | End: 2018-06-29 | Stop reason: HOSPADM

## 2018-06-27 RX ADMIN — FENTANYL CITRATE 25 MCG: 50 INJECTION, SOLUTION INTRAMUSCULAR; INTRAVENOUS at 08:19

## 2018-06-27 RX ADMIN — SODIUM CHLORIDE: 9 INJECTION, SOLUTION INTRAVENOUS at 07:25

## 2018-06-27 RX ADMIN — MIDAZOLAM HYDROCHLORIDE 1 MG: 1 INJECTION, SOLUTION INTRAMUSCULAR; INTRAVENOUS at 08:19

## 2018-06-27 RX ADMIN — HEPARIN SODIUM 5000 UNITS: 5000 INJECTION, SOLUTION INTRAVENOUS; SUBCUTANEOUS at 08:09

## 2018-06-27 RX ADMIN — LIDOCAINE HYDROCHLORIDE 10 ML: 20 INJECTION, SOLUTION INFILTRATION; PERINEURAL at 08:09

## 2018-06-27 RX ADMIN — VANCOMYCIN HYDROCHLORIDE 1000 MG: 1 INJECTION, SOLUTION INTRAVENOUS at 07:57

## 2018-06-27 RX ADMIN — MIDAZOLAM HYDROCHLORIDE 1 MG: 1 INJECTION, SOLUTION INTRAMUSCULAR; INTRAVENOUS at 08:08

## 2018-06-27 RX ADMIN — IOPAMIDOL 50 ML: 612 INJECTION, SOLUTION INTRATHECAL at 08:29

## 2018-06-27 RX ADMIN — ASPIRIN 81 MG: 81 TABLET, COATED ORAL at 07:25

## 2018-06-27 RX ADMIN — FENTANYL CITRATE 25 MCG: 50 INJECTION, SOLUTION INTRAMUSCULAR; INTRAVENOUS at 08:08

## 2018-08-08 ENCOUNTER — OFFICE VISIT (OUTPATIENT)
Dept: GASTROENTEROLOGY | Age: 48
End: 2018-08-08
Payer: MEDICARE

## 2018-08-08 VITALS
WEIGHT: 158.6 LBS | SYSTOLIC BLOOD PRESSURE: 140 MMHG | DIASTOLIC BLOOD PRESSURE: 60 MMHG | HEART RATE: 66 BPM | HEIGHT: 66 IN | OXYGEN SATURATION: 94 % | BODY MASS INDEX: 25.49 KG/M2

## 2018-08-08 DIAGNOSIS — K31.84 GASTROPARESIS: Primary | ICD-10-CM

## 2018-08-08 DIAGNOSIS — K59.09 CHRONIC CONSTIPATION: ICD-10-CM

## 2018-08-08 DIAGNOSIS — R11.2 NAUSEA AND VOMITING, INTRACTABILITY OF VOMITING NOT SPECIFIED, UNSPECIFIED VOMITING TYPE: ICD-10-CM

## 2018-08-08 PROCEDURE — G8427 DOCREV CUR MEDS BY ELIG CLIN: HCPCS | Performed by: NURSE PRACTITIONER

## 2018-08-08 PROCEDURE — 4004F PT TOBACCO SCREEN RCVD TLK: CPT | Performed by: NURSE PRACTITIONER

## 2018-08-08 PROCEDURE — 3046F HEMOGLOBIN A1C LEVEL >9.0%: CPT | Performed by: NURSE PRACTITIONER

## 2018-08-08 PROCEDURE — G8419 CALC BMI OUT NRM PARAM NOF/U: HCPCS | Performed by: NURSE PRACTITIONER

## 2018-08-08 PROCEDURE — 99214 OFFICE O/P EST MOD 30 MIN: CPT | Performed by: NURSE PRACTITIONER

## 2018-08-08 PROCEDURE — 2022F DILAT RTA XM EVC RTNOPTHY: CPT | Performed by: NURSE PRACTITIONER

## 2018-08-08 RX ORDER — BETHANECHOL CHLORIDE 25 MG/1
25 TABLET ORAL 4 TIMES DAILY
Qty: 120 TABLET | Refills: 11 | Status: SHIPPED | OUTPATIENT
Start: 2018-08-08 | End: 2018-12-19 | Stop reason: ALTCHOICE

## 2018-08-08 RX ORDER — MINOXIDIL 2.5 MG/1
5 TABLET ORAL NIGHTLY
COMMUNITY
End: 2018-12-19 | Stop reason: ALTCHOICE

## 2018-08-08 ASSESSMENT — ENCOUNTER SYMPTOMS
TROUBLE SWALLOWING: 0
CONSTIPATION: 1
ABDOMINAL DISTENTION: 0
SORE THROAT: 0
COUGH: 0
DIARRHEA: 0
VOICE CHANGE: 0
VOMITING: 1
BACK PAIN: 0
ABDOMINAL PAIN: 1
NAUSEA: 1
ANAL BLEEDING: 0
RECTAL PAIN: 0
SHORTNESS OF BREATH: 0
BLOOD IN STOOL: 0

## 2018-08-08 NOTE — PROGRESS NOTES
previous study for comparison. The review of the source images show radioactivity retained in the   stomach throughout the study is no evidence of radiopharmaceutical in   the duodenum aren't the proximal small bowel. The images obtained up   to 90 minutes show no activity in the duodenum or small bowel. The   time/activity curve is flat suggesting no evidence of gastric   emptying. Impression   An abnormal gastric emptying study suggesting a gastric   outlet obstruction or gastroparesis. EGD 10/2017 ([facility])- (n/v, wt loss, gastroparesis)- normal. Start bethanechol   (1) tomi negative    Colonoscopy 10/2017 ([facility])- (const/wt loss)- polyps, fair prep, 3 yr recall  (1) colon, ascending polyp, polypectomy:  Tubular adenoma. Negative for high-grade dysplasia. (2)  Colon, transverse polyp, polypectomy:  Tubular adenoma. Negative for high-grade dysplasia. Family HX:  Sister had gastric cancer, mother had colon cancer, father had colon polyps  Pt denies family hx of inflammatory bowel dx and esophageal CA.     Past Medical History:   Diagnosis Date    Arthritis     Chronic kidney disease, stage 5, kidney failure ([address]     DM (diabetes mellitus) type II controlled with renal manifestation ([address] 06/1993    Gastroparesis     GERD (gastroesophageal reflux disease)     Hypertension     Hypothyroid     Restless leg syndrome        Past Surgical History:   Procedure Laterality Date    BREAST SURGERY      infected milk gland removed    CHOLECYSTECTOMY      COLONOSCOPY      ENDOMETRIAL ABLATION  2012    GALLBLADDER SURGERY      [place] COLONOSCOPY W/BIOPSY SINGLE/MULTIPLE N/A 10/20/2017    Dr Nel Herrera prep-Tubular AP (-) dysplasia x 2--3 yr recall    [place] EGD TRANSORAL BIOPSY SINGLE/MULTIPLE N/A 12/27/2016    Dr Emerita Luu EGD TRANSORAL BIOPSY SINGLE/MULTIPLE N/A 10/20/2017    Dr Nolia Leventhal (-)    TUBAL LIGATION      VASCULAR SURGERY Left 2016    [facility] Rita Clemente [facility]  10/02/2017    [facility]. Left upper fistulograms/venograms, balloon angioplasty cephalic vein arch 85B12 conquest.       Social History     Social History    Marital status:      Spouse name: N/A    Number of children: 2    Years of education: N/A     Social History Main Topics    Smoking status: Current Every Day Smoker     Packs/day: 0.50     Years: 25.00     Types: Cigarettes     Last attempt to quit: 5/12/2017    Smokeless tobacco: Never Used    Alcohol use No    Drug use: No    Sexual activity: Not Asked     Other Topics Concern    None     Social History Narrative    None       Allergies   Allergen Reactions    Lamisil Advanced [Terbinafine Hcl]     Penicillins     Stadol [Butorphanol] Nausea And Vomiting       Current Outpatient Prescriptions   Medication Sig Dispense Refill    minoxidil (LONITEN) 2.5 MG tablet Take 5 mg by mouth daily      bethanechol (URECHOLINE) 25 MG tablet Take 1 tablet by mouth 4 times daily Take 30 minutes before each meal and at bedtime 120 tablet 11    metoclopramide (REGLAN) 5 MG tablet Take 1 tablet by mouth 3 times daily 120 tablet 4    cloNIDine (CATAPRES) 0.1 MG tablet Take 0.1 mg by mouth      senna (SENOKOT) 8.6 MG TABS tablet Take by mouth      levothyroxine (SYNTHROID) 150 MCG tablet Take 150 mcg by mouth Daily      valsartan (DIOVAN) 320 MG tablet Take 320 mg by mouth daily      metoprolol (LOPRESSOR) 100 MG tablet Take 100 mg by mouth 2 times daily      NIFEdipine (ADALAT CC) 60 MG extended release tablet Take 60 mg by mouth daily      isosorbide mononitrate (IMDUR) 120 MG extended release tablet Take 1 tablet by mouth daily 30 tablet 0    insulin detemir (LEVEMIR FLEXTOUCH) 100 UNIT/ML injection pen Inject 5 Units into the skin nightly 5 Pen 3    ondansetron (ZOFRAN) 4 MG tablet Take 4 mg by mouth every 8 hours as needed for Nausea or Vomiting      DULoxetine (CYMBALTA) 60 MG extended release capsule Take 60 mg by mouth daily  rOPINIRole (REQUIP) 2 MG tablet Take 2 mg by mouth nightly      docusate sodium (STOOL SOFTENER) 100 MG capsule Take 300 mg by mouth nightly       simvastatin (ZOCOR) 40 MG tablet Take 40 mg by mouth daily      insulin aspart (NOVOLOG FLEXPEN) 100 UNIT/ML injection pen Inject 5 Units into the skin 3 times daily       No current facility-administered medications for this visit. Review of Systems   Constitutional: Negative for appetite change, fatigue, fever and unexpected weight change. HENT: Negative for sore throat, trouble swallowing and voice change. Respiratory: Negative for cough and shortness of breath. Cardiovascular: Negative for chest pain, palpitations and leg swelling. Gastrointestinal: Positive for abdominal pain (pressure), constipation (chr/controlled), nausea and vomiting. Negative for abdominal distention, anal bleeding, blood in stool, diarrhea and rectal pain. Genitourinary: Negative for hematuria. Musculoskeletal: Positive for arthralgias. Negative for back pain and neck pain. Neurological: Negative for dizziness, weakness, light-headedness and headaches. Psychiatric/Behavioral: Positive for dysphoric mood (chr/stable per pt). Negative for sleep disturbance. The patient is not nervous/anxious. All other systems reviewed and are negative. Objective:     Physical Exam   Constitutional: She is oriented to person, place, and time. She appears well-developed and well-nourished. BP (!) 140/60   Pulse 66   Ht 5' 6\" (1.676 m)   Wt 158 lb 9.6 oz (71.9 kg)   SpO2 94%   BMI 25.60 kg/m²    Eyes: Conjunctivae and EOM are normal. Pupils are equal, round, and reactive to light. No scleral icterus. Neck: Normal range of motion. Neck supple. No thyromegaly present. Cardiovascular: Normal rate and regular rhythm. Exam reveals no gallop and no friction rub. Murmur (3/6 early systolic murmur auscultated) heard.   Pulmonary/Chest: Effort normal and breath sounds normal. No respiratory distress. Abdominal: Soft. Bowel sounds are normal. She exhibits no distension. There is no tenderness. There is no rebound. Musculoskeletal: Normal range of motion. She exhibits no edema or deformity. Neurological: She is alert and oriented to person, place, and time. No cranial nerve deficit. Psychiatric: She has a normal mood and affect. Judgment normal.   Nursing note and vitals reviewed. Assessment:       Diagnosis Orders   1. Gastroparesis  CT Enterography W Contrast    bethanechol (URECHOLINE) 25 MG tablet   2. Nausea and vomiting, intractability of vomiting not specified, unspecified vomiting type  CT Enterography W Contrast    bethanechol (URECHOLINE) 25 MG tablet   3. Chronic constipation     4. Uncontrolled type 2 diabetes mellitus with complication, with long-term current use of insulin ([address]           Plan:      1. Wean off reglan, she was given instructions on how to do this. Start bethanechol to see if this helps, use it 4x daily. If this doesn't work there isnt any other medicinal regimens to tx her n/v. Instructed her on the importance of good BG control  2. Lynette White notified to follow up on what happened to her referral to Dr [place] Finch and make sure this gets taken care of  3. CTE to assure no small bowel pathology-- will call her with results.   4. F/u in 1 year for med refills, sooner if needed

## 2018-08-08 NOTE — PATIENT INSTRUCTIONS
You need to wean off of your REGLAN. Decrease the reglan to 2x daily starting tomorrow. Then in 1 week decrease to once daily. Then stop it all together a week after that. You may go ahead and start the bethanechol today.

## 2018-08-15 ENCOUNTER — TELEPHONE (OUTPATIENT)
Dept: GASTROENTEROLOGY | Age: 48
End: 2018-08-15

## 2018-08-15 ENCOUNTER — HOSPITAL ENCOUNTER (OUTPATIENT)
Dept: CT IMAGING | Age: 48
Discharge: HOME | End: 2018-08-15
Payer: MEDICARE

## 2018-08-15 DIAGNOSIS — K31.84 GASTROPARESIS: ICD-10-CM

## 2018-08-15 DIAGNOSIS — R11.2 NAUSEA AND VOMITING, INTRACTABILITY OF VOMITING NOT SPECIFIED, UNSPECIFIED VOMITING TYPE: ICD-10-CM

## 2018-08-15 DIAGNOSIS — R93.2 ABNORMAL CT OF LIVER: Primary | ICD-10-CM

## 2018-08-15 DIAGNOSIS — R18.8 OTHER ASCITES: ICD-10-CM

## 2018-08-15 PROCEDURE — 6360000004 HC RX CONTRAST MEDICATION: Performed by: NURSE PRACTITIONER

## 2018-08-15 PROCEDURE — 74177 CT ABD & PELVIS W/CONTRAST: CPT

## 2018-08-15 RX ADMIN — IOPAMIDOL 50 ML: 755 INJECTION, SOLUTION INTRAVENOUS at 10:29

## 2018-08-17 ENCOUNTER — HOSPITAL ENCOUNTER (OUTPATIENT)
Dept: ULTRASOUND IMAGING | Age: 48
Discharge: HOME | End: 2018-08-17
Payer: MEDICARE

## 2018-08-17 VITALS
SYSTOLIC BLOOD PRESSURE: 171 MMHG | DIASTOLIC BLOOD PRESSURE: 78 MMHG | OXYGEN SATURATION: 94 % | RESPIRATION RATE: 17 BRPM | HEART RATE: 72 BPM

## 2018-08-17 VITALS
HEART RATE: 70 BPM | WEIGHT: 158 LBS | TEMPERATURE: 98.2 F | OXYGEN SATURATION: 94 % | DIASTOLIC BLOOD PRESSURE: 76 MMHG | RESPIRATION RATE: 16 BRPM | BODY MASS INDEX: 25.39 KG/M2 | SYSTOLIC BLOOD PRESSURE: 142 MMHG | HEIGHT: 66 IN

## 2018-08-17 DIAGNOSIS — R93.2 ABNORMAL CT OF LIVER: ICD-10-CM

## 2018-08-17 DIAGNOSIS — R18.8 OTHER ASCITES: ICD-10-CM

## 2018-08-17 LAB
ALBUMIN SERPL-MCNC: 4.2 G/DL (ref 3.5–5.2)
ALP BLD-CCNC: 101 U/L (ref 35–104)
ALT SERPL-CCNC: 9 U/L (ref 5–33)
APTT: 25.3 SEC (ref 26–36.2)
AST SERPL-CCNC: 14 U/L (ref 5–32)
BILIRUB SERPL-MCNC: 0.4 MG/DL (ref 0.2–1.2)
BILIRUBIN DIRECT: 0.2 MG/DL (ref 0–0.3)
BILIRUBIN, INDIRECT: 0.2 MG/DL (ref 0.1–1)
INR BLD: 0.96 (ref 0.88–1.18)
PLATELET # BLD: 154 K/UL (ref 130–400)
PROTHROMBIN TIME: 12.7 SEC (ref 12–14.6)
TOTAL PROTEIN: 7.6 G/DL (ref 6.6–8.7)

## 2018-08-17 PROCEDURE — 85049 AUTOMATED PLATELET COUNT: CPT

## 2018-08-17 PROCEDURE — 85730 THROMBOPLASTIN TIME PARTIAL: CPT

## 2018-08-17 PROCEDURE — 2720000000 US BIOPSY LIVER PERCUTANEOUS

## 2018-08-17 PROCEDURE — 88307 TISSUE EXAM BY PATHOLOGIST: CPT

## 2018-08-17 PROCEDURE — 85610 PROTHROMBIN TIME: CPT

## 2018-08-17 PROCEDURE — 88313 SPECIAL STAINS GROUP 2: CPT

## 2018-08-17 PROCEDURE — 80076 HEPATIC FUNCTION PANEL: CPT

## 2018-08-17 ASSESSMENT — PAIN SCALES - GENERAL
PAINLEVEL_OUTOF10: 3
PAINLEVEL_OUTOF10: 2
PAINLEVEL_OUTOF10: 2
PAINLEVEL_OUTOF10: 3
PAINLEVEL_OUTOF10: 4

## 2018-08-17 ASSESSMENT — PAIN - FUNCTIONAL ASSESSMENT
PAIN_FUNCTIONAL_ASSESSMENT: 0-10
PAIN_FUNCTIONAL_ASSESSMENT: 0-10

## 2018-08-20 ENCOUNTER — TELEPHONE (OUTPATIENT)
Dept: GASTROENTEROLOGY | Age: 48
End: 2018-08-20

## 2018-08-21 NOTE — TELEPHONE ENCOUNTER
Please make her an appt in few weeks to discuss results of liver bx and paracentesis. Also, please call radiology, I dont see any results of fluid , was the paracentesis done? Or was there not enough fluid? I dont see any notes from radiology for this. Thanks so much!

## 2018-10-09 ENCOUNTER — TELEPHONE (OUTPATIENT)
Dept: GASTROENTEROLOGY | Age: 48
End: 2018-10-09

## 2018-10-17 ENCOUNTER — OFFICE VISIT (OUTPATIENT)
Dept: VASCULAR SURGERY | Facility: CLINIC | Age: 48
End: 2018-10-17

## 2018-10-17 ENCOUNTER — TELEPHONE (OUTPATIENT)
Dept: VASCULAR SURGERY | Facility: CLINIC | Age: 48
End: 2018-10-17

## 2018-10-17 VITALS
BODY MASS INDEX: 25.23 KG/M2 | SYSTOLIC BLOOD PRESSURE: 118 MMHG | DIASTOLIC BLOOD PRESSURE: 70 MMHG | WEIGHT: 157 LBS | HEART RATE: 73 BPM | OXYGEN SATURATION: 99 % | HEIGHT: 66 IN

## 2018-10-17 DIAGNOSIS — N18.6 END STAGE KIDNEY DISEASE (HCC): Primary | ICD-10-CM

## 2018-10-17 DIAGNOSIS — I15.9 SECONDARY HYPERTENSION: ICD-10-CM

## 2018-10-17 PROCEDURE — 99213 OFFICE O/P EST LOW 20 MIN: CPT | Performed by: NURSE PRACTITIONER

## 2018-10-17 NOTE — TELEPHONE ENCOUNTER
Left message letting Ms Upton know that Georgette had spoke with Dr Castaneda and he would like for her to continue with Dr Uribe since she has been following with Dr Uribe and hasn't seen us in two years. Also advised if she had any questions to please call the office at [phone].    Georgette notified Darcy at [facility] [phone] and let them know she had been following with Dr Uribe and that is who Dr Castaneda would like her to continue to follow. Darcy verbalized understanding and stated they would get her set up with Dr Uribe.

## 2018-10-18 NOTE — PROGRESS NOTES
"10/17/2018      Norm Wiggins, APRN  [address] [place] [address]        Ena A Won  1970    Chief Complaint   Patient presents with   • Follow-up     Per Darcy at Dialysis need Fistula in left arm  looked at. Patient denies any stroke like symptoms.        Dear Norm Wiggins, APRN:    HPI     I had the pleasure of seeing you patient in the office today for follow up.  As you recall, the patient is a 48 y.o. female who we has seen in the past after a creation of left upper extremity AV fistula on 06/29/2016.  She has not been seen in our office since 07/19/2016.  Dialysis called wanting patient to be seen due to difficult access.  Her fistula is large with a thrill noted throughout however is some pulsatility noted mid-fistula.  She has not followed in our office however has had 2 different interventions with [facility], Dr. Uribe.  First was in September 2017 and most recently June 2018.  In June 2018 she did have a fistulogram with a a 5 on stent graft in the proximal cephalic vein extending to the subclavian vein.     /70 (BP Location: Right arm, Patient Position: Sitting, Cuff Size: Adult)   Pulse 73   Ht 167.6 cm (66\")   Wt 71.2 kg (157 lb)   SpO2 99%   BMI 25.34 kg/m²   Physical Exam   Constitutional: She is oriented to person, place, and time. She appears well-developed and well-nourished. No distress.   HENT:   Head: Normocephalic and atraumatic.   Mouth/Throat: Oropharynx is clear and moist.   Eyes: Pupils are equal, round, and reactive to light. No scleral icterus.   Neck: Normal range of motion. Neck supple. No JVD present. Carotid bruit is not present. No thyromegaly present.   Cardiovascular: Normal rate, regular rhythm, S2 normal, normal heart sounds and normal pulses.  Exam reveals no gallop and no friction rub.    No murmur heard.  Left upper extremity AV fistula with thrill noted however some pulsatility mid fistula   Pulmonary/Chest: Effort normal and " breath sounds normal.   Abdominal: Soft. Normal aorta and bowel sounds are normal. There is no hepatosplenomegaly.   Musculoskeletal: Normal range of motion.   Neurological: She is alert and oriented to person, place, and time. No cranial nerve deficit.   Skin: Skin is warm and dry. She is not diaphoretic.   Psychiatric: She has a normal mood and affect. Her behavior is normal. Judgment and thought content normal.   Nursing note and vitals reviewed.      DIAGNOSTIC DATA:    No results found.    Patient Active Problem List   Diagnosis   • CKD stage 4 due to type 2 diabetes mellitus (CMS/HCC)   • Non-ketotic hyperosmolar coma (CMS/HCC)   • HTN (hypertension)   • Intractable nausea and vomiting   • Constipation   • Gastroparesis due to DM (CMS/HCC)   • Hyperglycemia   • Seizure (CMS/HCC)   • End stage kidney disease (CMS/HCC)         ICD-10-CM ICD-9-CM   1. End stage kidney disease (CMS/HCC) N18.6 585.6   2. Secondary hypertension I15.9 405.99       Lab Frequency Next Occurrence       PLAN: After thoroughly evaluating nEa BHATTI Upton, I believe the best course of action is to refer her back to Dr. Uribe.  We did create her access however she has not followed in our office since 2016.  She has had 2 different interventions with [facility].  I did discuss with Dr. Castaneda as well.  I did speak with Darcy at dialysis who is aware to obtain an appointment with Dr. Uribe.  The patient is to continue taking their medications as previously discussed.   This was all discussed in full with complete understanding.  Thank you for allowing me to participate in the care of your patient.  Please do not hesitate to call with any questions or concerns.  We will keep you aware of any further encounters with Ena Upton.      Sincerely Yours,      GAEL Jiang

## 2018-10-19 PROBLEM — N18.6 END STAGE KIDNEY DISEASE (HCC): Status: ACTIVE | Noted: 2018-10-19

## 2018-10-23 ENCOUNTER — TELEPHONE (OUTPATIENT)
Dept: VASCULAR SURGERY | Age: 48
End: 2018-10-23

## 2018-10-26 ENCOUNTER — PREP FOR PROCEDURE (OUTPATIENT)
Dept: VASCULAR SURGERY | Age: 48
End: 2018-10-26

## 2018-10-26 RX ORDER — CLONIDINE HYDROCHLORIDE 0.1 MG/1
0.1 TABLET ORAL PRN
Status: CANCELLED | OUTPATIENT
Start: 2018-10-26

## 2018-10-26 RX ORDER — SODIUM CHLORIDE 9 MG/ML
INJECTION, SOLUTION INTRAVENOUS CONTINUOUS
Status: CANCELLED | OUTPATIENT
Start: 2018-10-26

## 2018-10-26 RX ORDER — SODIUM CHLORIDE 0.9 % (FLUSH) 0.9 %
10 SYRINGE (ML) INJECTION PRN
Status: CANCELLED | OUTPATIENT
Start: 2018-10-26

## 2018-10-26 RX ORDER — ASPIRIN 81 MG/1
81 TABLET ORAL ONCE
Status: CANCELLED | OUTPATIENT
Start: 2018-10-26 | End: 2018-10-26

## 2018-10-29 ENCOUNTER — HOSPITAL ENCOUNTER (OUTPATIENT)
Dept: INTERVENTIONAL RADIOLOGY/VASCULAR | Age: 48
Discharge: HOME | End: 2018-10-29
Payer: MEDICARE

## 2018-10-29 VITALS
BODY MASS INDEX: 25.23 KG/M2 | HEART RATE: 64 BPM | DIASTOLIC BLOOD PRESSURE: 71 MMHG | OXYGEN SATURATION: 100 % | HEIGHT: 66 IN | SYSTOLIC BLOOD PRESSURE: 129 MMHG | TEMPERATURE: 98.3 F | RESPIRATION RATE: 17 BRPM | WEIGHT: 157 LBS

## 2018-10-29 DIAGNOSIS — N18.6 ESRD ON DIALYSIS (HCC): ICD-10-CM

## 2018-10-29 DIAGNOSIS — Z99.2 ESRD ON DIALYSIS (HCC): ICD-10-CM

## 2018-10-29 PROCEDURE — 6360000004 HC RX CONTRAST MEDICATION: Performed by: SURGERY

## 2018-10-29 PROCEDURE — 99152 MOD SED SAME PHYS/QHP 5/>YRS: CPT | Performed by: SURGERY

## 2018-10-29 PROCEDURE — 36901 INTRO CATH DIALYSIS CIRCUIT: CPT | Performed by: SURGERY

## 2018-10-29 PROCEDURE — 2580000003 HC RX 258: Performed by: NURSE PRACTITIONER

## 2018-10-29 PROCEDURE — C1894 INTRO/SHEATH, NON-LASER: HCPCS

## 2018-10-29 PROCEDURE — 6370000000 HC RX 637 (ALT 250 FOR IP): Performed by: NURSE PRACTITIONER

## 2018-10-29 PROCEDURE — 6360000002 HC RX W HCPCS: Performed by: SURGERY

## 2018-10-29 PROCEDURE — 2500000003 HC RX 250 WO HCPCS: Performed by: SURGERY

## 2018-10-29 PROCEDURE — 99153 MOD SED SAME PHYS/QHP EA: CPT | Performed by: SURGERY

## 2018-10-29 RX ORDER — HYDROCODONE BITARTRATE AND ACETAMINOPHEN 5; 325 MG/1; MG/1
2 TABLET ORAL EVERY 4 HOURS PRN
Status: DISCONTINUED | OUTPATIENT
Start: 2018-10-29 | End: 2018-10-31 | Stop reason: HOSPADM

## 2018-10-29 RX ORDER — CEFAZOLIN SODIUM 1 G/50ML
1 INJECTION, SOLUTION INTRAVENOUS
Status: DISCONTINUED | OUTPATIENT
Start: 2018-10-29 | End: 2018-10-29

## 2018-10-29 RX ORDER — HYDROCODONE BITARTRATE AND ACETAMINOPHEN 5; 325 MG/1; MG/1
1 TABLET ORAL EVERY 4 HOURS PRN
Status: DISCONTINUED | OUTPATIENT
Start: 2018-10-29 | End: 2018-10-31 | Stop reason: HOSPADM

## 2018-10-29 RX ORDER — MIDAZOLAM HYDROCHLORIDE 1 MG/ML
INJECTION INTRAMUSCULAR; INTRAVENOUS
Status: COMPLETED | OUTPATIENT
Start: 2018-10-29 | End: 2018-10-29

## 2018-10-29 RX ORDER — HEPARIN SODIUM 5000 [USP'U]/ML
INJECTION, SOLUTION INTRAVENOUS; SUBCUTANEOUS
Status: COMPLETED | OUTPATIENT
Start: 2018-10-29 | End: 2018-10-29

## 2018-10-29 RX ORDER — SODIUM CHLORIDE 0.9 % (FLUSH) 0.9 %
10 SYRINGE (ML) INJECTION PRN
Status: DISCONTINUED | OUTPATIENT
Start: 2018-10-29 | End: 2018-10-31 | Stop reason: HOSPADM

## 2018-10-29 RX ORDER — SODIUM CHLORIDE 9 MG/ML
INJECTION, SOLUTION INTRAVENOUS CONTINUOUS
Status: DISCONTINUED | OUTPATIENT
Start: 2018-10-29 | End: 2018-10-31 | Stop reason: HOSPADM

## 2018-10-29 RX ORDER — CLONIDINE HYDROCHLORIDE 0.1 MG/1
0.1 TABLET ORAL PRN
Status: DISCONTINUED | OUTPATIENT
Start: 2018-10-29 | End: 2018-10-31 | Stop reason: HOSPADM

## 2018-10-29 RX ORDER — ONDANSETRON 2 MG/ML
4 INJECTION INTRAMUSCULAR; INTRAVENOUS EVERY 8 HOURS PRN
Status: DISCONTINUED | OUTPATIENT
Start: 2018-10-29 | End: 2018-10-31 | Stop reason: HOSPADM

## 2018-10-29 RX ORDER — LIDOCAINE HYDROCHLORIDE 20 MG/ML
INJECTION, SOLUTION INFILTRATION; PERINEURAL
Status: COMPLETED | OUTPATIENT
Start: 2018-10-29 | End: 2018-10-29

## 2018-10-29 RX ORDER — ASPIRIN 81 MG/1
81 TABLET ORAL ONCE
Status: COMPLETED | OUTPATIENT
Start: 2018-10-29 | End: 2018-10-29

## 2018-10-29 RX ORDER — FENTANYL CITRATE 50 UG/ML
INJECTION, SOLUTION INTRAMUSCULAR; INTRAVENOUS
Status: COMPLETED | OUTPATIENT
Start: 2018-10-29 | End: 2018-10-29

## 2018-10-29 RX ORDER — VANCOMYCIN HYDROCHLORIDE 1 G/200ML
1000 INJECTION, SOLUTION INTRAVENOUS ONCE
Status: COMPLETED | OUTPATIENT
Start: 2018-10-29 | End: 2018-10-29

## 2018-10-29 RX ORDER — ACETAMINOPHEN 325 MG/1
650 TABLET ORAL EVERY 4 HOURS PRN
Status: DISCONTINUED | OUTPATIENT
Start: 2018-10-29 | End: 2018-10-31 | Stop reason: HOSPADM

## 2018-10-29 RX ADMIN — FENTANYL CITRATE 25 MCG: 50 INJECTION INTRAMUSCULAR; INTRAVENOUS at 13:35

## 2018-10-29 RX ADMIN — MIDAZOLAM 1 MG: 1 INJECTION INTRAMUSCULAR; INTRAVENOUS at 13:35

## 2018-10-29 RX ADMIN — ASPIRIN 81 MG: 81 TABLET ORAL at 13:01

## 2018-10-29 RX ADMIN — VANCOMYCIN HYDROCHLORIDE 1000 MG: 1 INJECTION, SOLUTION INTRAVENOUS at 13:24

## 2018-10-29 RX ADMIN — IOPAMIDOL 15 ML: 612 INJECTION, SOLUTION INTRATHECAL at 13:50

## 2018-10-29 RX ADMIN — SODIUM CHLORIDE: 9 INJECTION, SOLUTION INTRAVENOUS at 13:08

## 2018-10-29 RX ADMIN — HEPARIN SODIUM 5000 UNITS: 5000 INJECTION, SOLUTION INTRAVENOUS; SUBCUTANEOUS at 13:38

## 2018-10-29 RX ADMIN — LIDOCAINE HYDROCHLORIDE 5 ML: 20 INJECTION, SOLUTION INFILTRATION; PERINEURAL at 13:38

## 2018-10-29 NOTE — H&P (VIEW-ONLY)
EGD TRANSORAL BIOPSY SINGLE/MULTIPLE N/A 2016    Dr Chrissie Lutz EGD TRANSORAL BIOPSY SINGLE/MULTIPLE N/A 10/20/2017    Dr Kristina Walker (-)    TUBAL LIGATION      VASCULAR SURGERY Left 2016    fistula by Dr Mirna Gao at [address]  10/02/2017    SJS. Left upper fistulograms/venograms, balloon angioplasty cephalic vein arch 46I57 conquest.      Family History   Problem Relation Age of Onset    Colon Cancer Mother          at 63    Colon Polyps Mother     Lung Cancer Father          at 79    Colon Polyps Father     Stomach Cancer Sister     Breast Cancer Sister     Depression Daughter 25        committed suicide    Liver Cancer Neg Hx     Liver Disease Neg Hx     Esophageal Cancer Neg Hx     Rectal Cancer Neg Hx       Social History   Substance Use Topics    Smoking status: Current Every Day Smoker     Packs/day: 0.50     Years: 25.00     Types: Cigarettes     Last attempt to quit: 2017    Smokeless tobacco: Never Used    Alcohol use No       Permit for fistulogram with possible angioplasty or stent    Risks have been reviewed with the patient including but not limited to heart attack, death, CVA, bleeding, nerve injury, infection, steal, pain, and need for further surgery.       _______________________________________________________________________  Signature     Date    Time

## 2018-10-29 NOTE — PROGRESS NOTES
DR. Tasneem Albert HERE TO TALK WITH PT REGARDING FISTULOGRAM RESULTS. PT VOICED UNDERSTANDINGS. PT OOB WITH RN @ SIDE. PT AMBULATED IN ROOM WITHOUT DIFFICULTY. DENIES PAIN.

## 2018-11-01 NOTE — OP NOTE
subcutaneous tissues in the distal upper arm were anesthetized  with lidocaine. Micropuncture needle was used to cannulate the cephalic  vein without difficulty. Seldinger technique was utilized to place a  4-Pashto Glidesheath. Left upper extremity fistulograms including venography to the superior  vena cava were performed with the above findings. The sheath was removed and pressure applied for hemostasis. The patient  tolerated the procedure well.         Sony Zarate MD    D: 11/01/2018 11:07:41      T: 11/01/2018 12:28:55     [id number]  Job#: [id number]     Doc#: [id number]    CC:  Lizet Eden CNP       [facility], MD Mallory Martinez MD

## 2018-11-12 ENCOUNTER — PREP FOR PROCEDURE (OUTPATIENT)
Dept: VASCULAR SURGERY | Age: 48
End: 2018-11-12

## 2018-11-12 ENCOUNTER — OFFICE VISIT (OUTPATIENT)
Dept: VASCULAR SURGERY | Age: 48
End: 2018-11-12
Payer: MEDICARE

## 2018-11-12 VITALS — SYSTOLIC BLOOD PRESSURE: 138 MMHG | HEART RATE: 72 BPM | DIASTOLIC BLOOD PRESSURE: 78 MMHG | TEMPERATURE: 98 F

## 2018-11-12 DIAGNOSIS — Z99.2 ESRD ON DIALYSIS (HCC): Primary | ICD-10-CM

## 2018-11-12 DIAGNOSIS — N18.6 ESRD ON DIALYSIS (HCC): Primary | ICD-10-CM

## 2018-11-12 PROCEDURE — G8484 FLU IMMUNIZE NO ADMIN: HCPCS | Performed by: NURSE PRACTITIONER

## 2018-11-12 PROCEDURE — 4004F PT TOBACCO SCREEN RCVD TLK: CPT | Performed by: NURSE PRACTITIONER

## 2018-11-12 PROCEDURE — G8419 CALC BMI OUT NRM PARAM NOF/U: HCPCS | Performed by: NURSE PRACTITIONER

## 2018-11-12 PROCEDURE — G8427 DOCREV CUR MEDS BY ELIG CLIN: HCPCS | Performed by: NURSE PRACTITIONER

## 2018-11-12 PROCEDURE — 99213 OFFICE O/P EST LOW 20 MIN: CPT | Performed by: NURSE PRACTITIONER

## 2018-11-12 RX ORDER — SODIUM CHLORIDE 0.9 % (FLUSH) 0.9 %
10 SYRINGE (ML) INJECTION PRN
Status: CANCELLED | OUTPATIENT
Start: 2018-11-12

## 2018-11-12 RX ORDER — CLONIDINE HYDROCHLORIDE 0.1 MG/1
0.1 TABLET ORAL PRN
Status: CANCELLED | OUTPATIENT
Start: 2018-11-12

## 2018-11-12 RX ORDER — ASPIRIN 81 MG/1
81 TABLET ORAL ONCE
Status: CANCELLED | OUTPATIENT
Start: 2018-11-12 | End: 2018-11-12

## 2018-11-12 RX ORDER — SODIUM CHLORIDE 9 MG/ML
INJECTION, SOLUTION INTRAVENOUS CONTINUOUS
Status: CANCELLED | OUTPATIENT
Start: 2018-11-12

## 2018-11-12 NOTE — H&P
disease)     Hypertension     Hypothyroid     Restless leg syndrome      Past Surgical History:   Procedure Laterality Date    BREAST SURGERY      infected milk gland removed    CHOLECYSTECTOMY      COLONOSCOPY      ENDOMETRIAL ABLATION      GALLBLADDER SURGERY      HYSTERECTOMY      PARTIAL HYSTERECTOMY    [place] COLONOSCOPY W/BIOPSY SINGLE/MULTIPLE N/A 10/20/2017    Dr Cheri Gonsalves prep-Tubular AP (-) dysplasia x 2--3 yr recall    [place] EGD TRANSORAL BIOPSY SINGLE/MULTIPLE N/A 2016    Dr Sanchez Narayan EGD TRANSORAL BIOPSY SINGLE/MULTIPLE N/A 10/20/2017    Dr Becki Ribera (-)    TUBAL LIGATION      VASCULAR SURGERY Left 2016    fistula by Dr July Smith at [address]  10/02/2017    [facility]. Left upper fistulograms/venograms, balloon angioplasty cephalic vein arch 48I33 conquest.    VASCULAR SURGERY  2018    FISTULOGRAM    VASCULAR SURGERY  10/29/2018    SJS. Left upper fistulograms/venograms     Family History   Problem Relation Age of Onset    Colon Cancer Mother          at 63    Colon Polyps Mother     Lung Cancer Father          at 66    Colon Polyps Father     Stomach Cancer Sister     Breast Cancer Sister     Depression Daughter 25        committed suicide    Liver Cancer Neg Hx     Liver Disease Neg Hx     Esophageal Cancer Neg Hx     Rectal Cancer Neg Hx      Social History   Substance Use Topics    Smoking status: Current Every Day Smoker     Packs/day: 0.50     Years: 25.00     Types: Cigarettes     Last attempt to quit: 2017    Smokeless tobacco: Never Used    Alcohol use No         Review of Systems    Constitutional - no significant activity change, appetite change, or unexpected weight change. No fever or chills. No diaphoresis or significant fatigue. HENT - no significant rhinorrhea or epistaxis. No tinnitus or significant hearing loss. Eyes - no sudden vision change or amaurosis.   Respiratory - no significant shortness of breath, wheezing, or stridor. No apnea, cough, or chest tightness associated with shortness of breath. Cardiovascular - no chest pain, syncope, or significant dizziness. No palpitations or significant leg swelling. No claudication. Has possible symptoms of steal.  Gastrointestinal - no abdominal swelling or pain. No blood in stool. No severe constipation, diarrhea, nausea, or vomiting. Genitourinary - No dysuria, frequency, or urgency. No flank pain or hematuria. Musculoskeletal - no back pain, gait disturbance, or myalgia. Skin - no color change, rash, pallor, or new wound. Neurologic - no dizziness, facial asymmetry, or light headedness. No seizures. No speech difficulty or lateralizing weakness. History of   Hematologic - no easy bruising or excessive bleeding. Psychiatric - no severe anxiety or nervousness. No confusion. All other review of systems are negative. Physical Exam    /78 (Site: Right Upper Arm)   Pulse 72   Temp 98 °F (36.7 °C)     Constitutional - well developed, well nourished. No diaphoresis or acute distress. HENT - head normocephalic. Right external ear canal appears normal.  Left external ear canal appears normal.  Septum appears midline. Eyes - conjunctiva normal.  EOMS normal.  No exudate. No icterus. Neck- ROM appears normal, no tracheal deviation. Cardiovascular - Regular rate and rhythm. Heart sounds are normal.  No murmur, rub, or gallop. Carotid pulses are 2+ to palpation bilaterally without bruit. Extremities -  No cyanosis, clubbing, or significant edema. No signs atheroembolic event. Bruit audible - yes, thrill palpalbe - yes. Pulmonary - effort appears normal.  No respiratory distress. Lungs - Breath sounds normal. No wheezes or rales. GI - Abdomen - soft, non tender, bowel sounds X 4 quadrants. No guarding or rebound tenderness. No distension or palpable mass. Genitourinary - deferred.   Musculoskeletal - ROM appears normal.  No significant edema. Neurologic - alert and oriented X 3. Physiologic. Skin - warm, dry, and intact. No rash, erythema, or pallor. Psychiatric - mood, affect, and behavior appear normal.  Judgment and thought processes appear normal.      Options have been discussed with the patient including continued medical management and fistulogram with possible a'plasty. Patient has opted to proceed with fistulogram with possible angioplasty. Risks have been discussed with the patient including MI, death, stroke, nerve injury, infection, bleed and steal phenomenon. Assessment    1.  ESRD on dialysis [facility])          Plan    Fistulogram/UE angiogram with possible angioplasty/stent      Patient is ready to proceed

## 2018-11-12 NOTE — PROGRESS NOTES
disease)     Hypertension     Hypothyroid     Restless leg syndrome      Past Surgical History:   Procedure Laterality Date    BREAST SURGERY      infected milk gland removed    CHOLECYSTECTOMY      COLONOSCOPY      ENDOMETRIAL ABLATION      GALLBLADDER SURGERY      HYSTERECTOMY      PARTIAL HYSTERECTOMY    [place] COLONOSCOPY W/BIOPSY SINGLE/MULTIPLE N/A 10/20/2017    Dr Anne Flores prep-Tubular AP (-) dysplasia x 2--3 yr recall    [place] EGD TRANSORAL BIOPSY SINGLE/MULTIPLE N/A 2016    Dr Carole Xiong EGD TRANSORAL BIOPSY SINGLE/MULTIPLE N/A 10/20/2017    Dr Tri Temple (-)    TUBAL LIGATION      VASCULAR SURGERY Left 2016    fistula by Dr Yosef Gonzalez at [address]  10/02/2017    SJS. Left upper fistulograms/venograms, balloon angioplasty cephalic vein arch 73Q15 conquest.    VASCULAR SURGERY  2018    FISTULOGRAM    VASCULAR SURGERY  10/29/2018    SJS. Left upper fistulograms/venograms     Family History   Problem Relation Age of Onset    Colon Cancer Mother          at 63    Colon Polyps Mother     Lung Cancer Father          at 66    Colon Polyps Father     Stomach Cancer Sister     Breast Cancer Sister     Depression Daughter 25        committed suicide    Liver Cancer Neg Hx     Liver Disease Neg Hx     Esophageal Cancer Neg Hx     Rectal Cancer Neg Hx      Social History   Substance Use Topics    Smoking status: Current Every Day Smoker     Packs/day: 0.50     Years: 25.00     Types: Cigarettes     Last attempt to quit: 2017    Smokeless tobacco: Never Used    Alcohol use No         Review of Systems    Constitutional - no significant activity change, appetite change, or unexpected weight change. No fever or chills. No diaphoresis or significant fatigue. HENT - no significant rhinorrhea or epistaxis. No tinnitus or significant hearing loss. Eyes - no sudden vision change or amaurosis.   Respiratory - no significant shortness of breath, wheezing,

## 2018-11-13 ENCOUNTER — TELEPHONE (OUTPATIENT)
Dept: VASCULAR SURGERY | Age: 48
End: 2018-11-13

## 2018-12-05 ENCOUNTER — OFFICE VISIT (OUTPATIENT)
Dept: GASTROENTEROLOGY | Age: 48
End: 2018-12-05
Payer: MEDICARE

## 2018-12-05 VITALS
OXYGEN SATURATION: 98 % | WEIGHT: 158 LBS | HEART RATE: 60 BPM | SYSTOLIC BLOOD PRESSURE: 150 MMHG | DIASTOLIC BLOOD PRESSURE: 60 MMHG | BODY MASS INDEX: 25.39 KG/M2 | HEIGHT: 66 IN

## 2018-12-05 DIAGNOSIS — E83.19 HIGH HEPATIC IRON CONCENTRATION DETERMINED BY BIOPSY OF LIVER: ICD-10-CM

## 2018-12-05 DIAGNOSIS — K31.84 GASTROPARESIS: ICD-10-CM

## 2018-12-05 DIAGNOSIS — R11.2 NAUSEA AND VOMITING, INTRACTABILITY OF VOMITING NOT SPECIFIED, UNSPECIFIED VOMITING TYPE: ICD-10-CM

## 2018-12-05 DIAGNOSIS — E83.19 HIGH HEPATIC IRON CONCENTRATION DETERMINED BY BIOPSY OF LIVER: Primary | ICD-10-CM

## 2018-12-05 DIAGNOSIS — R19.7 DIARRHEA, UNSPECIFIED TYPE: ICD-10-CM

## 2018-12-05 LAB
ALBUMIN SERPL-MCNC: 4.1 G/DL (ref 3.5–5.2)
ALP BLD-CCNC: 130 U/L (ref 35–104)
ALT SERPL-CCNC: 22 U/L (ref 5–33)
AST SERPL-CCNC: 19 U/L (ref 5–32)
BILIRUB SERPL-MCNC: 0.4 MG/DL (ref 0.2–1.2)
BILIRUBIN DIRECT: 0.1 MG/DL (ref 0–0.3)
BILIRUBIN, INDIRECT: 0.3 MG/DL (ref 0.1–1)
FERRITIN: 1054 NG/ML (ref 13–150)
IRON SATURATION: 31 % (ref 14–50)
IRON: 77 UG/DL (ref 37–145)
TOTAL IRON BINDING CAPACITY: 249 UG/DL (ref 250–400)
TOTAL PROTEIN: 8.1 G/DL (ref 6.6–8.7)

## 2018-12-05 PROCEDURE — G8419 CALC BMI OUT NRM PARAM NOF/U: HCPCS | Performed by: NURSE PRACTITIONER

## 2018-12-05 PROCEDURE — G8427 DOCREV CUR MEDS BY ELIG CLIN: HCPCS | Performed by: NURSE PRACTITIONER

## 2018-12-05 PROCEDURE — 4004F PT TOBACCO SCREEN RCVD TLK: CPT | Performed by: NURSE PRACTITIONER

## 2018-12-05 PROCEDURE — G8484 FLU IMMUNIZE NO ADMIN: HCPCS | Performed by: NURSE PRACTITIONER

## 2018-12-05 PROCEDURE — 99214 OFFICE O/P EST MOD 30 MIN: CPT | Performed by: NURSE PRACTITIONER

## 2018-12-05 ASSESSMENT — ENCOUNTER SYMPTOMS
ABDOMINAL PAIN: 0
COUGH: 0
BACK PAIN: 0
VOMITING: 1
DIARRHEA: 1
CONSTIPATION: 0
NAUSEA: 1
ABDOMINAL DISTENTION: 0
ANAL BLEEDING: 0
TROUBLE SWALLOWING: 0
SHORTNESS OF BREATH: 0
RECTAL PAIN: 0
BLOOD IN STOOL: 0
VOICE CHANGE: 0
SORE THROAT: 0

## 2018-12-05 NOTE — PROGRESS NOTES
SINGLE/MULTIPLE N/A 12/27/2016    Dr Ramila Giraldo EGD TRANSORAL BIOPSY SINGLE/MULTIPLE N/A 10/20/2017    Dr Valentín Ball (-)    TUBAL LIGATION      VASCULAR SURGERY Left 2016    fistula by Dr Bailon Quiet at [address]  10/02/2017    SJS. Left upper fistulograms/venograms, balloon angioplasty cephalic vein arch 40X12 conquest.    VASCULAR SURGERY  08/2018    FISTULOGRAM    VASCULAR SURGERY  10/29/2018    SJS. Left upper fistulograms/venograms       Social History     Social History    Marital status:      Spouse name: N/A    Number of children: 2    Years of education: N/A     Social History Main Topics    Smoking status: Current Every Day Smoker     Packs/day: 0.50     Years: 25.00     Types: Cigarettes     Last attempt to quit: 5/12/2017    Smokeless tobacco: Never Used    Alcohol use No    Drug use: No    Sexual activity: Not Currently     Partners: Male     Other Topics Concern    None     Social History Narrative    None       Allergies   Allergen Reactions    Lamisil Advanced [Terbinafine]     Penicillins     Stadol [Butorphanol] Nausea And Vomiting       Current Outpatient Prescriptions   Medication Sig Dispense Refill    minoxidil (LONITEN) 2.5 MG tablet Take 5 mg by mouth nightly       bethanechol (URECHOLINE) 25 MG tablet Take 1 tablet by mouth 4 times daily Take 30 minutes before each meal and at bedtime 120 tablet 11    metoclopramide (REGLAN) 5 MG tablet Take 1 tablet by mouth 3 times daily 120 tablet 4    cloNIDine (CATAPRES) 0.1 MG tablet Take 0.1 mg by mouth      senna (SENOKOT) 8.6 MG TABS tablet Take by mouth      levothyroxine (SYNTHROID) 150 MCG tablet Take 150 mcg by mouth Daily      valsartan (DIOVAN) 320 MG tablet Take 320 mg by mouth nightly       metoprolol (LOPRESSOR) 100 MG tablet Take 100 mg by mouth 2 times daily      NIFEdipine (ADALAT CC) 60 MG extended release tablet Take 60 mg by mouth daily      isosorbide mononitrate (IMDUR) 120 MG extended release tablet Take 1 tablet by mouth daily 30 tablet 0    insulin detemir (LEVEMIR FLEXTOUCH) 100 UNIT/ML injection pen Inject 5 Units into the skin nightly (Patient taking differently: Inject 15 Units into the skin nightly ) 5 Pen 3    ondansetron (ZOFRAN) 4 MG tablet Take 4 mg by mouth every 8 hours as needed for Nausea or Vomiting      DULoxetine (CYMBALTA) 60 MG extended release capsule Take 60 mg by mouth daily      rOPINIRole (REQUIP) 2 MG tablet Take 2 mg by mouth nightly      docusate sodium (STOOL SOFTENER) 100 MG capsule Take 300 mg by mouth nightly       simvastatin (ZOCOR) 40 MG tablet Take 40 mg by mouth daily      insulin aspart (NOVOLOG FLEXPEN) 100 UNIT/ML injection pen Inject 5 Units into the skin 3 times daily       No current facility-administered medications for this visit. Review of Systems   Constitutional: Negative for appetite change, fatigue, fever and unexpected weight change. HENT: Negative for sore throat, trouble swallowing and voice change. Respiratory: Negative for cough and shortness of breath. Cardiovascular: Negative for chest pain, palpitations and leg swelling. Gastrointestinal: Positive for diarrhea, nausea and vomiting. Negative for abdominal distention, abdominal pain, anal bleeding, blood in stool, constipation and rectal pain. Genitourinary: Negative for hematuria. Musculoskeletal: Negative for arthralgias, back pain and neck pain. Neurological: Negative for dizziness, weakness, light-headedness and headaches. Psychiatric/Behavioral: Negative for dysphoric mood and sleep disturbance. The patient is not nervous/anxious. All other systems reviewed and are negative. Objective:     Physical Exam   Constitutional: She is oriented to person, place, and time. She appears well-developed and well-nourished.    BP (!) 150/60   Pulse 60   Ht 5' 6\" (1.676 m)   Wt 158 lb (71.7 kg)   SpO2 98%   BMI 25.50 kg/m²    Eyes: Pupils are equal,

## 2018-12-10 ENCOUNTER — TELEPHONE (OUTPATIENT)
Dept: VASCULAR SURGERY | Age: 48
End: 2018-12-10

## 2018-12-10 ENCOUNTER — TELEPHONE (OUTPATIENT)
Dept: GASTROENTEROLOGY | Age: 48
End: 2018-12-10

## 2018-12-11 LAB
C282Y HEMOCHROMATOSIS MUT: NEGATIVE
H63D HEMOCHROMATOSIS MUT: NORMAL
HEMOCHROMATOSIS GENE ANALYSIS: NORMAL
HFE PCR SPECIMEN: NORMAL
S65C HEMOCHROMATOSIS MUT: NEGATIVE

## 2018-12-12 ENCOUNTER — TELEPHONE (OUTPATIENT)
Dept: GASTROENTEROLOGY | Age: 48
End: 2018-12-12

## 2018-12-12 DIAGNOSIS — E83.19 HYPERFERRITINEMIA CATARACT SYNDROME: Primary | ICD-10-CM

## 2018-12-12 DIAGNOSIS — Q12.0 HYPERFERRITINEMIA CATARACT SYNDROME: Primary | ICD-10-CM

## 2018-12-12 DIAGNOSIS — E83.19 HIGH HEPATIC IRON CONCENTRATION DETERMINED BY BIOPSY OF LIVER: ICD-10-CM

## 2018-12-18 NOTE — TELEPHONE ENCOUNTER
12-18-18 1:45 PM    This patient called today from [phone], she left a VM , it seems she tried to schedule her own OV with 's office and they would not schedule with the patient per the patient? I see where Carin Huston has faxed a referral so I am a little confused here, I will send to [facility] and have her check on this and then contact the patient back to discuss.  [facility]

## 2018-12-18 NOTE — TELEPHONE ENCOUNTER
12/18/18 2:40pm   Called [facility] office s/w Sonja Radford, made Alexandria Fletcher an apt on 12/27/18 @ 2:45 w/Aníbal GARCIA. I called Alexandria Fletcher and told her the time and day of the appt. She voiced understand.  Reed Samaniego

## 2018-12-19 ENCOUNTER — HOSPITAL ENCOUNTER (OUTPATIENT)
Dept: INTERVENTIONAL RADIOLOGY/VASCULAR | Age: 48
Discharge: HOME | End: 2018-12-19
Payer: MEDICARE

## 2018-12-19 VITALS
OXYGEN SATURATION: 94 % | HEART RATE: 63 BPM | HEIGHT: 66 IN | SYSTOLIC BLOOD PRESSURE: 116 MMHG | DIASTOLIC BLOOD PRESSURE: 60 MMHG | BODY MASS INDEX: 24.43 KG/M2 | WEIGHT: 152 LBS | TEMPERATURE: 97 F | RESPIRATION RATE: 19 BRPM

## 2018-12-19 DIAGNOSIS — I73.9 PVD (PERIPHERAL VASCULAR DISEASE) (HCC): ICD-10-CM

## 2018-12-19 DIAGNOSIS — T82.898A STEAL SYNDROME AS COMPLICATION OF DIALYSIS ACCESS, INITIAL ENCOUNTER (HCC): ICD-10-CM

## 2018-12-19 PROCEDURE — 36901 INTRO CATH DIALYSIS CIRCUIT: CPT | Performed by: SURGERY

## 2018-12-19 PROCEDURE — 2500000003 HC RX 250 WO HCPCS: Performed by: SURGERY

## 2018-12-19 PROCEDURE — 2709999900 IR AORTAGRAM ABDOMINAL SERIALOGRAM

## 2018-12-19 PROCEDURE — 2580000003 HC RX 258: Performed by: NURSE PRACTITIONER

## 2018-12-19 PROCEDURE — 6360000004 HC RX CONTRAST MEDICATION: Performed by: SURGERY

## 2018-12-19 PROCEDURE — 6370000000 HC RX 637 (ALT 250 FOR IP): Performed by: NURSE PRACTITIONER

## 2018-12-19 PROCEDURE — 99152 MOD SED SAME PHYS/QHP 5/>YRS: CPT | Performed by: SURGERY

## 2018-12-19 PROCEDURE — 36902 INTRO CATH DIALYSIS CIRCUIT: CPT | Performed by: SURGERY

## 2018-12-19 PROCEDURE — 36215 PLACE CATHETER IN ARTERY: CPT | Performed by: SURGERY

## 2018-12-19 PROCEDURE — 99153 MOD SED SAME PHYS/QHP EA: CPT | Performed by: SURGERY

## 2018-12-19 PROCEDURE — 6360000002 HC RX W HCPCS: Performed by: SURGERY

## 2018-12-19 PROCEDURE — 75710 ARTERY X-RAYS ARM/LEG: CPT | Performed by: SURGERY

## 2018-12-19 RX ORDER — HEPARIN SODIUM 5000 [USP'U]/ML
INJECTION, SOLUTION INTRAVENOUS; SUBCUTANEOUS
Status: COMPLETED | OUTPATIENT
Start: 2018-12-19 | End: 2018-12-19

## 2018-12-19 RX ORDER — LEVOFLOXACIN 500 MG/1
500 TABLET, FILM COATED ORAL DAILY
COMMUNITY
Start: 2017-01-16 | End: 2019-01-07 | Stop reason: ALTCHOICE

## 2018-12-19 RX ORDER — ONDANSETRON 2 MG/ML
4 INJECTION INTRAMUSCULAR; INTRAVENOUS EVERY 8 HOURS PRN
Status: DISCONTINUED | OUTPATIENT
Start: 2018-12-19 | End: 2018-12-21 | Stop reason: HOSPADM

## 2018-12-19 RX ORDER — ASPIRIN 81 MG/1
81 TABLET ORAL ONCE
Status: COMPLETED | OUTPATIENT
Start: 2018-12-19 | End: 2018-12-19

## 2018-12-19 RX ORDER — SODIUM CHLORIDE 9 MG/ML
INJECTION, SOLUTION INTRAVENOUS CONTINUOUS
Status: DISCONTINUED | OUTPATIENT
Start: 2018-12-19 | End: 2018-12-21 | Stop reason: HOSPADM

## 2018-12-19 RX ORDER — LIDOCAINE HYDROCHLORIDE 20 MG/ML
INJECTION, SOLUTION INFILTRATION; PERINEURAL
Status: COMPLETED | OUTPATIENT
Start: 2018-12-19 | End: 2018-12-19

## 2018-12-19 RX ORDER — SODIUM CHLORIDE 0.9 % (FLUSH) 0.9 %
10 SYRINGE (ML) INJECTION PRN
Status: DISCONTINUED | OUTPATIENT
Start: 2018-12-19 | End: 2018-12-21 | Stop reason: HOSPADM

## 2018-12-19 RX ORDER — HYDROCODONE BITARTRATE AND ACETAMINOPHEN 5; 325 MG/1; MG/1
1 TABLET ORAL EVERY 4 HOURS PRN
Status: DISCONTINUED | OUTPATIENT
Start: 2018-12-19 | End: 2018-12-21 | Stop reason: HOSPADM

## 2018-12-19 RX ORDER — FENTANYL CITRATE 50 UG/ML
INJECTION, SOLUTION INTRAMUSCULAR; INTRAVENOUS
Status: COMPLETED | OUTPATIENT
Start: 2018-12-19 | End: 2018-12-19

## 2018-12-19 RX ORDER — ACETAMINOPHEN 325 MG/1
650 TABLET ORAL EVERY 4 HOURS PRN
Status: DISCONTINUED | OUTPATIENT
Start: 2018-12-19 | End: 2018-12-21 | Stop reason: HOSPADM

## 2018-12-19 RX ORDER — MIDAZOLAM HYDROCHLORIDE 1 MG/ML
INJECTION INTRAMUSCULAR; INTRAVENOUS
Status: COMPLETED | OUTPATIENT
Start: 2018-12-19 | End: 2018-12-19

## 2018-12-19 RX ORDER — VANCOMYCIN HYDROCHLORIDE 1 G/200ML
1000 INJECTION, SOLUTION INTRAVENOUS
Status: DISCONTINUED | OUTPATIENT
Start: 2018-12-19 | End: 2018-12-19

## 2018-12-19 RX ORDER — CEFAZOLIN SODIUM 1 G/50ML
1 INJECTION, SOLUTION INTRAVENOUS
Status: DISCONTINUED | OUTPATIENT
Start: 2018-12-19 | End: 2018-12-19

## 2018-12-19 RX ORDER — LOSARTAN POTASSIUM 25 MG/1
25 TABLET ORAL DAILY
Status: ON HOLD | COMMUNITY
End: 2019-04-02 | Stop reason: HOSPADM

## 2018-12-19 RX ORDER — CLONIDINE HYDROCHLORIDE 0.1 MG/1
0.1 TABLET ORAL PRN
Status: DISCONTINUED | OUTPATIENT
Start: 2018-12-19 | End: 2018-12-21 | Stop reason: HOSPADM

## 2018-12-19 RX ORDER — HYDROCODONE BITARTRATE AND ACETAMINOPHEN 5; 325 MG/1; MG/1
2 TABLET ORAL EVERY 4 HOURS PRN
Status: DISCONTINUED | OUTPATIENT
Start: 2018-12-19 | End: 2018-12-21 | Stop reason: HOSPADM

## 2018-12-19 RX ORDER — ALBUTEROL SULFATE 90 UG/1
2 AEROSOL, METERED RESPIRATORY (INHALATION) EVERY 4 HOURS PRN
COMMUNITY

## 2018-12-19 RX ADMIN — IOPAMIDOL 95 ML: 612 INJECTION, SOLUTION INTRATHECAL at 11:51

## 2018-12-19 RX ADMIN — MIDAZOLAM 0.5 MG: 1 INJECTION INTRAMUSCULAR; INTRAVENOUS at 11:18

## 2018-12-19 RX ADMIN — SODIUM CHLORIDE: 9 INJECTION, SOLUTION INTRAVENOUS at 10:13

## 2018-12-19 RX ADMIN — ASPIRIN 81 MG: 81 TABLET, COATED ORAL at 10:25

## 2018-12-19 RX ADMIN — HEPARIN SODIUM 5000 UNITS: 5000 INJECTION, SOLUTION INTRAVENOUS; SUBCUTANEOUS at 11:24

## 2018-12-19 RX ADMIN — LIDOCAINE HYDROCHLORIDE 10 ML: 20 INJECTION, SOLUTION INFILTRATION; PERINEURAL at 11:24

## 2018-12-19 RX ADMIN — FENTANYL CITRATE 25 MCG: 50 INJECTION INTRAMUSCULAR; INTRAVENOUS at 11:18

## 2018-12-19 NOTE — H&P
Gastroesophageal reflux disease without esophagitis      Acquired hypothyroidism      Chronic constipation 11/04/2016             Current Outpatient Prescriptions   Medication Sig Dispense Refill    minoxidil (LONITEN) 2.5 MG tablet Take 5 mg by mouth nightly         bethanechol (URECHOLINE) 25 MG tablet Take 1 tablet by mouth 4 times daily Take 30 minutes before each meal and at bedtime 120 tablet 11    metoclopramide (REGLAN) 5 MG tablet Take 1 tablet by mouth 3 times daily 120 tablet 4    cloNIDine (CATAPRES) 0.1 MG tablet Take 0.1 mg by mouth        senna (SENOKOT) 8.6 MG TABS tablet Take by mouth        levothyroxine (SYNTHROID) 150 MCG tablet Take 150 mcg by mouth Daily        valsartan (DIOVAN) 320 MG tablet Take 320 mg by mouth nightly         metoprolol (LOPRESSOR) 100 MG tablet Take 100 mg by mouth 2 times daily        NIFEdipine (ADALAT CC) 60 MG extended release tablet Take 60 mg by mouth daily        isosorbide mononitrate (IMDUR) 120 MG extended release tablet Take 1 tablet by mouth daily 30 tablet 0    insulin detemir (LEVEMIR FLEXTOUCH) 100 UNIT/ML injection pen Inject 5 Units into the skin nightly (Patient taking differently: Inject 15 Units into the skin nightly ) 5 Pen 3    ondansetron (ZOFRAN) 4 MG tablet Take 4 mg by mouth every 8 hours as needed for Nausea or Vomiting        DULoxetine (CYMBALTA) 60 MG extended release capsule Take 60 mg by mouth daily        rOPINIRole (REQUIP) 2 MG tablet Take 2 mg by mouth nightly        docusate sodium (STOOL SOFTENER) 100 MG capsule Take 300 mg by mouth nightly         simvastatin (ZOCOR) 40 MG tablet Take 40 mg by mouth daily        insulin aspart (NOVOLOG FLEXPEN) 100 UNIT/ML injection pen Inject 5 Units into the skin 3 times daily          No current facility-administered medications for this visit.       Allergies: Lamisil advanced [terbinafine hcl];  Penicillins; and Stadol [butorphanol]       Past Medical History:   Diagnosis Date

## 2018-12-19 NOTE — BRIEF OP NOTE
Brief Postoperative Note    Brenda Fay  YOB: 1970  [id number]    Pre-operative Diagnosis: ESRD, Steal left upper av fistula    Post-operative Diagnosis: Same    Procedure: Arch aortogram, Left upper arteriograms    Anesthesia: local  and moderate sedation    Surgeons/Assistants: SHAYNA LOZANO    Estimated Blood Loss: less than 50     Complications: None    Specimens: Was Not Obtained    Findings: Steal, no obvious arterial stenosis    Electronically signed by Isabelle Armijo MD on 12/19/2018 at 11:51 AM

## 2018-12-19 NOTE — PROGRESS NOTES
Returned post fistulogram.  Awake and alert. Puncture site to left upper arm clear with band aide on place. Denies any c/o pain. Son at bedside.

## 2019-01-07 ENCOUNTER — OFFICE VISIT (OUTPATIENT)
Dept: VASCULAR SURGERY | Age: 49
End: 2019-01-07
Payer: MEDICARE

## 2019-01-07 ENCOUNTER — HOSPITAL ENCOUNTER (OUTPATIENT)
Dept: GENERAL RADIOLOGY | Age: 49
Discharge: HOME | End: 2019-01-07
Payer: MEDICARE

## 2019-01-07 ENCOUNTER — HOSPITAL ENCOUNTER (OUTPATIENT)
Dept: PREADMISSION TESTING | Age: 49
Discharge: HOME | End: 2019-01-11
Payer: MEDICARE

## 2019-01-07 ENCOUNTER — TELEPHONE (OUTPATIENT)
Dept: VASCULAR SURGERY | Age: 49
End: 2019-01-07

## 2019-01-07 VITALS — HEART RATE: 88 BPM | TEMPERATURE: 98 F | SYSTOLIC BLOOD PRESSURE: 138 MMHG | DIASTOLIC BLOOD PRESSURE: 72 MMHG

## 2019-01-07 VITALS — HEIGHT: 66 IN | BODY MASS INDEX: 24.59 KG/M2 | WEIGHT: 153 LBS

## 2019-01-07 DIAGNOSIS — T82.898D STEAL SYNDROME AS COMPLICATION OF DIALYSIS ACCESS, SUBSEQUENT ENCOUNTER: Primary | ICD-10-CM

## 2019-01-07 DIAGNOSIS — Z01.818 PRE-OP TESTING: Primary | ICD-10-CM

## 2019-01-07 DIAGNOSIS — Z01.818 PRE-OP TESTING: ICD-10-CM

## 2019-01-07 PROCEDURE — 87081 CULTURE SCREEN ONLY: CPT

## 2019-01-07 PROCEDURE — G8427 DOCREV CUR MEDS BY ELIG CLIN: HCPCS | Performed by: NURSE PRACTITIONER

## 2019-01-07 PROCEDURE — 4004F PT TOBACCO SCREEN RCVD TLK: CPT | Performed by: NURSE PRACTITIONER

## 2019-01-07 PROCEDURE — G8484 FLU IMMUNIZE NO ADMIN: HCPCS | Performed by: NURSE PRACTITIONER

## 2019-01-07 PROCEDURE — G8420 CALC BMI NORM PARAMETERS: HCPCS | Performed by: NURSE PRACTITIONER

## 2019-01-07 PROCEDURE — 93005 ELECTROCARDIOGRAM TRACING: CPT

## 2019-01-07 PROCEDURE — 71046 X-RAY EXAM CHEST 2 VIEWS: CPT

## 2019-01-07 PROCEDURE — 99213 OFFICE O/P EST LOW 20 MIN: CPT | Performed by: NURSE PRACTITIONER

## 2019-01-08 ENCOUNTER — PREP FOR PROCEDURE (OUTPATIENT)
Dept: VASCULAR SURGERY | Age: 49
End: 2019-01-08

## 2019-01-08 LAB
EKG P AXIS: 71 DEGREES
EKG P-R INTERVAL: 172 MS
EKG Q-T INTERVAL: 420 MS
EKG QRS DURATION: 88 MS
EKG QTC CALCULATION (BAZETT): 434 MS
EKG T AXIS: 61 DEGREES
MRSA CULTURE ONLY: NORMAL

## 2019-01-08 RX ORDER — VANCOMYCIN HYDROCHLORIDE 1 G/200ML
1000 INJECTION, SOLUTION INTRAVENOUS
Status: CANCELLED | OUTPATIENT
Start: 2019-01-08 | End: 2019-01-08

## 2019-01-08 RX ORDER — SODIUM CHLORIDE 0.9 % (FLUSH) 0.9 %
10 SYRINGE (ML) INJECTION PRN
Status: CANCELLED | OUTPATIENT
Start: 2019-01-08

## 2019-01-08 RX ORDER — ASPIRIN 81 MG/1
81 TABLET ORAL ONCE
Status: CANCELLED | OUTPATIENT
Start: 2019-01-08 | End: 2019-01-08

## 2019-01-08 RX ORDER — SODIUM CHLORIDE 0.9 % (FLUSH) 0.9 %
10 SYRINGE (ML) INJECTION EVERY 12 HOURS SCHEDULED
Status: CANCELLED | OUTPATIENT
Start: 2019-01-08

## 2019-01-25 ENCOUNTER — APPOINTMENT (OUTPATIENT)
Dept: INTERVENTIONAL RADIOLOGY/VASCULAR | Age: 49
DRG: 252 | End: 2019-01-25
Attending: SURGERY
Payer: MEDICARE

## 2019-01-25 ENCOUNTER — ANESTHESIA (OUTPATIENT)
Dept: OPERATING ROOM | Age: 49
DRG: 252 | End: 2019-01-25
Payer: MEDICARE

## 2019-01-25 ENCOUNTER — HOSPITAL ENCOUNTER (INPATIENT)
Age: 49
LOS: 2 days | Discharge: HOME | DRG: 252 | End: 2019-01-27
Attending: SURGERY | Admitting: SURGERY
Payer: MEDICARE

## 2019-01-25 ENCOUNTER — ANESTHESIA EVENT (OUTPATIENT)
Dept: OPERATING ROOM | Age: 49
DRG: 252 | End: 2019-01-25
Payer: MEDICARE

## 2019-01-25 VITALS
SYSTOLIC BLOOD PRESSURE: 113 MMHG | RESPIRATION RATE: 17 BRPM | TEMPERATURE: 98.8 F | OXYGEN SATURATION: 100 % | DIASTOLIC BLOOD PRESSURE: 67 MMHG

## 2019-01-25 DIAGNOSIS — T82.590A MALFUNCTION OF ARTERIOVENOUS DIALYSIS FISTULA, INITIAL ENCOUNTER (HCC): Primary | ICD-10-CM

## 2019-01-25 DIAGNOSIS — T82.590D MALFUNCTION OF ARTERIOVENOUS DIALYSIS FISTULA, SUBSEQUENT ENCOUNTER: ICD-10-CM

## 2019-01-25 DIAGNOSIS — T82.898D STEAL SYNDROME AS COMPLICATION OF DIALYSIS ACCESS, SUBSEQUENT ENCOUNTER: ICD-10-CM

## 2019-01-25 PROBLEM — Z99.2 ESRD ON HEMODIALYSIS (HCC): Status: ACTIVE | Noted: 2019-01-25

## 2019-01-25 PROBLEM — N18.6 ESRD ON HEMODIALYSIS (HCC): Status: ACTIVE | Noted: 2019-01-25

## 2019-01-25 LAB
ANION GAP SERPL CALCULATED.3IONS-SCNC: 13 MMOL/L (ref 7–19)
APTT: 25 SEC (ref 26–36.2)
BUN BLDV-MCNC: 35 MG/DL (ref 6–20)
CALCIUM SERPL-MCNC: 9.4 MG/DL (ref 8.6–10)
CHLORIDE BLD-SCNC: 89 MMOL/L (ref 98–111)
CO2: 32 MMOL/L (ref 22–29)
CREAT SERPL-MCNC: 4.2 MG/DL (ref 0.5–0.9)
GFR NON-AFRICAN AMERICAN: 11
GLUCOSE BLD-MCNC: 246 MG/DL (ref 70–99)
GLUCOSE BLD-MCNC: 271 MG/DL (ref 74–109)
GLUCOSE BLD-MCNC: 425 MG/DL (ref 70–99)
HBA1C MFR BLD: 11.2 % (ref 4–6)
HCG(URINE) PREGNANCY TEST: NEGATIVE
HCT VFR BLD CALC: 35.6 % (ref 37–47)
HEMOGLOBIN: 11.5 G/DL (ref 12–16)
INR BLD: 1.03 (ref 0.88–1.18)
MCH RBC QN AUTO: 30.9 PG (ref 27–31)
MCHC RBC AUTO-ENTMCNC: 32.3 G/DL (ref 33–37)
MCV RBC AUTO: 95.7 FL (ref 81–99)
PDW BLD-RTO: 13.6 % (ref 11.5–14.5)
PERFORMED ON: ABNORMAL
PERFORMED ON: ABNORMAL
PLATELET # BLD: 141 K/UL (ref 130–400)
PMV BLD AUTO: 9.5 FL (ref 9.4–12.3)
POTASSIUM REFLEX MAGNESIUM: 4.5 MMOL/L (ref 3.5–4.9)
PROTHROMBIN TIME: 12.9 SEC (ref 12–14.6)
RBC # BLD: 3.72 M/UL (ref 4.2–5.4)
SODIUM BLD-SCNC: 134 MMOL/L (ref 136–145)
WBC # BLD: 5 K/UL (ref 4.8–10.8)

## 2019-01-25 PROCEDURE — 6370000000 HC RX 637 (ALT 250 FOR IP): Performed by: SURGERY

## 2019-01-25 PROCEDURE — 2580000003 HC RX 258: Performed by: NURSE ANESTHETIST, CERTIFIED REGISTERED

## 2019-01-25 PROCEDURE — 2580000003 HC RX 258: Performed by: SURGERY

## 2019-01-25 PROCEDURE — 36415 COLL VENOUS BLD VENIPUNCTURE: CPT

## 2019-01-25 PROCEDURE — 85610 PROTHROMBIN TIME: CPT

## 2019-01-25 PROCEDURE — 7100000000 HC PACU RECOVERY - FIRST 15 MIN: Performed by: SURGERY

## 2019-01-25 PROCEDURE — 0JH63XZ INSERTION OF TUNNELED VASCULAR ACCESS DEVICE INTO CHEST SUBCUTANEOUS TISSUE AND FASCIA, PERCUTANEOUS APPROACH: ICD-10-PCS | Performed by: SURGERY

## 2019-01-25 PROCEDURE — 76937 US GUIDE VASCULAR ACCESS: CPT | Performed by: SURGERY

## 2019-01-25 PROCEDURE — 2500000003 HC RX 250 WO HCPCS: Performed by: NURSE ANESTHETIST, CERTIFIED REGISTERED

## 2019-01-25 PROCEDURE — 6370000000 HC RX 637 (ALT 250 FOR IP): Performed by: NURSE PRACTITIONER

## 2019-01-25 PROCEDURE — 051F0KY BYPASS LEFT CEPHALIC VEIN TO UPPER VEIN WITH NONAUTOLOGOUS TISSUE SUBSTITUTE, OPEN APPROACH: ICD-10-PCS | Performed by: SURGERY

## 2019-01-25 PROCEDURE — 85730 THROMBOPLASTIN TIME PARTIAL: CPT

## 2019-01-25 PROCEDURE — 6360000002 HC RX W HCPCS: Performed by: SURGERY

## 2019-01-25 PROCEDURE — 02HV33Z INSERTION OF INFUSION DEVICE INTO SUPERIOR VENA CAVA, PERCUTANEOUS APPROACH: ICD-10-PCS | Performed by: SURGERY

## 2019-01-25 PROCEDURE — 6360000002 HC RX W HCPCS

## 2019-01-25 PROCEDURE — 80048 BASIC METABOLIC PNL TOTAL CA: CPT

## 2019-01-25 PROCEDURE — 05WY07Z REVISION OF AUTOLOGOUS TISSUE SUBSTITUTE IN UPPER VEIN, OPEN APPROACH: ICD-10-PCS | Performed by: SURGERY

## 2019-01-25 PROCEDURE — 2709999900 HC NON-CHARGEABLE SUPPLY: Performed by: SURGERY

## 2019-01-25 PROCEDURE — 2700000000 HC OXYGEN THERAPY PER DAY

## 2019-01-25 PROCEDURE — 85027 COMPLETE CBC AUTOMATED: CPT

## 2019-01-25 PROCEDURE — 36558 INSERT TUNNELED CV CATH: CPT | Performed by: SURGERY

## 2019-01-25 PROCEDURE — C1750 CATH, HEMODIALYSIS,LONG-TERM: HCPCS | Performed by: SURGERY

## 2019-01-25 PROCEDURE — 94664 DEMO&/EVAL PT USE INHALER: CPT

## 2019-01-25 PROCEDURE — 6360000002 HC RX W HCPCS: Performed by: NURSE ANESTHETIST, CERTIFIED REGISTERED

## 2019-01-25 PROCEDURE — 84703 CHORIONIC GONADOTROPIN ASSAY: CPT

## 2019-01-25 PROCEDURE — 6360000002 HC RX W HCPCS: Performed by: NURSE PRACTITIONER

## 2019-01-25 PROCEDURE — 82948 REAGENT STRIP/BLOOD GLUCOSE: CPT

## 2019-01-25 PROCEDURE — 3700000000 HC ANESTHESIA ATTENDED CARE: Performed by: SURGERY

## 2019-01-25 PROCEDURE — 7100000001 HC PACU RECOVERY - ADDTL 15 MIN: Performed by: SURGERY

## 2019-01-25 PROCEDURE — C1760 CLOSURE DEV, VASC: HCPCS | Performed by: SURGERY

## 2019-01-25 PROCEDURE — 3600000004 HC SURGERY LEVEL 4 BASE: Performed by: SURGERY

## 2019-01-25 PROCEDURE — 83036 HEMOGLOBIN GLYCOSYLATED A1C: CPT

## 2019-01-25 PROCEDURE — C1768 GRAFT, VASCULAR: HCPCS | Performed by: SURGERY

## 2019-01-25 PROCEDURE — 3600000014 HC SURGERY LEVEL 4 ADDTL 15MIN: Performed by: SURGERY

## 2019-01-25 PROCEDURE — 1210000000 HC MED SURG R&B

## 2019-01-25 PROCEDURE — 36832 AV FISTULA REVISION OPEN: CPT | Performed by: SURGERY

## 2019-01-25 PROCEDURE — 3700000001 HC ADD 15 MINUTES (ANESTHESIA): Performed by: SURGERY

## 2019-01-25 PROCEDURE — 77001 FLUOROGUIDE FOR VEIN DEVICE: CPT | Performed by: SURGERY

## 2019-01-25 DEVICE — ANASTOCLIP GC CLOSURE SYSTEM, MEDIUM (M)
Type: IMPLANTABLE DEVICE | Site: ARM | Status: FUNCTIONAL
Brand: ANASTOCLIP GC CLOSURE SYSTEM

## 2019-01-25 DEVICE — GRAFT VASC L30CM ID8MM BOV CAR ART: Type: IMPLANTABLE DEVICE | Site: ARM | Status: FUNCTIONAL

## 2019-01-25 RX ORDER — SODIUM CHLORIDE 0.9 % (FLUSH) 0.9 %
10 SYRINGE (ML) INJECTION EVERY 12 HOURS SCHEDULED
Status: DISCONTINUED | OUTPATIENT
Start: 2019-01-25 | End: 2019-01-25 | Stop reason: HOSPADM

## 2019-01-25 RX ORDER — LABETALOL HYDROCHLORIDE 5 MG/ML
5 INJECTION, SOLUTION INTRAVENOUS EVERY 10 MIN PRN
Status: DISCONTINUED | OUTPATIENT
Start: 2019-01-25 | End: 2019-01-25 | Stop reason: HOSPADM

## 2019-01-25 RX ORDER — MIDAZOLAM HYDROCHLORIDE 1 MG/ML
2 INJECTION INTRAMUSCULAR; INTRAVENOUS
Status: DISCONTINUED | OUTPATIENT
Start: 2019-01-25 | End: 2019-01-25 | Stop reason: HOSPADM

## 2019-01-25 RX ORDER — PROMETHAZINE HYDROCHLORIDE 25 MG/ML
6.25 INJECTION, SOLUTION INTRAMUSCULAR; INTRAVENOUS
Status: DISCONTINUED | OUTPATIENT
Start: 2019-01-25 | End: 2019-01-25 | Stop reason: HOSPADM

## 2019-01-25 RX ORDER — HYDROCODONE BITARTRATE AND ACETAMINOPHEN 5; 325 MG/1; MG/1
2 TABLET ORAL EVERY 4 HOURS PRN
Status: DISCONTINUED | OUTPATIENT
Start: 2019-01-25 | End: 2019-01-27 | Stop reason: HOSPADM

## 2019-01-25 RX ORDER — MORPHINE SULFATE/0.9% NACL/PF 1 MG/ML
4 SYRINGE (ML) INJECTION EVERY 5 MIN PRN
Status: DISCONTINUED | OUTPATIENT
Start: 2019-01-25 | End: 2019-01-25 | Stop reason: HOSPADM

## 2019-01-25 RX ORDER — ENALAPRILAT 2.5 MG/2ML
0.62 INJECTION INTRAVENOUS
Status: DISCONTINUED | OUTPATIENT
Start: 2019-01-25 | End: 2019-01-25 | Stop reason: HOSPADM

## 2019-01-25 RX ORDER — ONDANSETRON 4 MG/1
4 TABLET, FILM COATED ORAL EVERY 8 HOURS PRN
Status: DISCONTINUED | OUTPATIENT
Start: 2019-01-25 | End: 2019-01-27 | Stop reason: HOSPADM

## 2019-01-25 RX ORDER — SIMVASTATIN 40 MG
40 TABLET ORAL NIGHTLY
Status: DISCONTINUED | OUTPATIENT
Start: 2019-01-25 | End: 2019-01-27 | Stop reason: HOSPADM

## 2019-01-25 RX ORDER — HYDRALAZINE HYDROCHLORIDE 20 MG/ML
5 INJECTION INTRAMUSCULAR; INTRAVENOUS EVERY 10 MIN PRN
Status: DISCONTINUED | OUTPATIENT
Start: 2019-01-25 | End: 2019-01-25 | Stop reason: HOSPADM

## 2019-01-25 RX ORDER — EPHEDRINE SULFATE 50 MG/ML
INJECTION, SOLUTION INTRAVENOUS PRN
Status: DISCONTINUED | OUTPATIENT
Start: 2019-01-25 | End: 2019-01-25 | Stop reason: SDUPTHER

## 2019-01-25 RX ORDER — DEXAMETHASONE SODIUM PHOSPHATE 10 MG/ML
INJECTION INTRAMUSCULAR; INTRAVENOUS PRN
Status: DISCONTINUED | OUTPATIENT
Start: 2019-01-25 | End: 2019-01-25 | Stop reason: SDUPTHER

## 2019-01-25 RX ORDER — LOSARTAN POTASSIUM 25 MG/1
25 TABLET ORAL DAILY
Status: DISCONTINUED | OUTPATIENT
Start: 2019-01-26 | End: 2019-01-27

## 2019-01-25 RX ORDER — DEXTROSE MONOHYDRATE 25 G/50ML
12.5 INJECTION, SOLUTION INTRAVENOUS PRN
Status: DISCONTINUED | OUTPATIENT
Start: 2019-01-25 | End: 2019-01-27 | Stop reason: HOSPADM

## 2019-01-25 RX ORDER — LABETALOL HYDROCHLORIDE 5 MG/ML
INJECTION, SOLUTION INTRAVENOUS PRN
Status: DISCONTINUED | OUTPATIENT
Start: 2019-01-25 | End: 2019-01-25 | Stop reason: SDUPTHER

## 2019-01-25 RX ORDER — CLONIDINE HYDROCHLORIDE 0.1 MG/1
0.1 TABLET ORAL 2 TIMES DAILY PRN
Status: DISCONTINUED | OUTPATIENT
Start: 2019-01-25 | End: 2019-01-27 | Stop reason: HOSPADM

## 2019-01-25 RX ORDER — FENTANYL CITRATE 50 UG/ML
25 INJECTION, SOLUTION INTRAMUSCULAR; INTRAVENOUS
Status: DISCONTINUED | OUTPATIENT
Start: 2019-01-25 | End: 2019-01-25 | Stop reason: HOSPADM

## 2019-01-25 RX ORDER — DIPHENHYDRAMINE HYDROCHLORIDE 50 MG/ML
12.5 INJECTION INTRAMUSCULAR; INTRAVENOUS
Status: DISCONTINUED | OUTPATIENT
Start: 2019-01-25 | End: 2019-01-25 | Stop reason: HOSPADM

## 2019-01-25 RX ORDER — SODIUM CHLORIDE 0.9 % (FLUSH) 0.9 %
10 SYRINGE (ML) INJECTION PRN
Status: DISCONTINUED | OUTPATIENT
Start: 2019-01-25 | End: 2019-01-25 | Stop reason: HOSPADM

## 2019-01-25 RX ORDER — METOCLOPRAMIDE HYDROCHLORIDE 5 MG/ML
10 INJECTION INTRAMUSCULAR; INTRAVENOUS ONCE
Status: COMPLETED | OUTPATIENT
Start: 2019-01-25 | End: 2019-01-25

## 2019-01-25 RX ORDER — LIDOCAINE HYDROCHLORIDE 10 MG/ML
INJECTION, SOLUTION EPIDURAL; INFILTRATION; INTRACAUDAL; PERINEURAL PRN
Status: DISCONTINUED | OUTPATIENT
Start: 2019-01-25 | End: 2019-01-25 | Stop reason: SDUPTHER

## 2019-01-25 RX ORDER — ACETAMINOPHEN 325 MG/1
650 TABLET ORAL EVERY 4 HOURS PRN
Status: DISCONTINUED | OUTPATIENT
Start: 2019-01-25 | End: 2019-01-27 | Stop reason: HOSPADM

## 2019-01-25 RX ORDER — SODIUM CHLORIDE 9 MG/ML
INJECTION, SOLUTION INTRAVENOUS CONTINUOUS
Status: DISCONTINUED | OUTPATIENT
Start: 2019-01-25 | End: 2019-01-25

## 2019-01-25 RX ORDER — PROTAMINE SULFATE 10 MG/ML
INJECTION, SOLUTION INTRAVENOUS PRN
Status: DISCONTINUED | OUTPATIENT
Start: 2019-01-25 | End: 2019-01-25 | Stop reason: SDUPTHER

## 2019-01-25 RX ORDER — ONDANSETRON 2 MG/ML
INJECTION INTRAMUSCULAR; INTRAVENOUS PRN
Status: DISCONTINUED | OUTPATIENT
Start: 2019-01-25 | End: 2019-01-25 | Stop reason: SDUPTHER

## 2019-01-25 RX ORDER — MORPHINE SULFATE/0.9% NACL/PF 1 MG/ML
2 SYRINGE (ML) INJECTION EVERY 5 MIN PRN
Status: DISCONTINUED | OUTPATIENT
Start: 2019-01-25 | End: 2019-01-25 | Stop reason: HOSPADM

## 2019-01-25 RX ORDER — METOPROLOL TARTRATE 50 MG/1
100 TABLET, FILM COATED ORAL 2 TIMES DAILY
Status: DISCONTINUED | OUTPATIENT
Start: 2019-01-25 | End: 2019-01-27 | Stop reason: HOSPADM

## 2019-01-25 RX ORDER — ASPIRIN 81 MG/1
81 TABLET ORAL ONCE
Status: COMPLETED | OUTPATIENT
Start: 2019-01-25 | End: 2019-01-25

## 2019-01-25 RX ORDER — FENTANYL CITRATE 50 UG/ML
50 INJECTION, SOLUTION INTRAMUSCULAR; INTRAVENOUS
Status: DISCONTINUED | OUTPATIENT
Start: 2019-01-25 | End: 2019-01-25 | Stop reason: HOSPADM

## 2019-01-25 RX ORDER — SODIUM CHLORIDE 450 MG/100ML
INJECTION, SOLUTION INTRAVENOUS ONCE
Status: COMPLETED | OUTPATIENT
Start: 2019-01-25 | End: 2019-01-25

## 2019-01-25 RX ORDER — METOCLOPRAMIDE HYDROCHLORIDE 5 MG/ML
INJECTION INTRAMUSCULAR; INTRAVENOUS
Status: COMPLETED
Start: 2019-01-25 | End: 2019-01-25

## 2019-01-25 RX ORDER — DULOXETIN HYDROCHLORIDE 60 MG/1
60 CAPSULE, DELAYED RELEASE ORAL NIGHTLY
Status: DISCONTINUED | OUTPATIENT
Start: 2019-01-25 | End: 2019-01-27 | Stop reason: HOSPADM

## 2019-01-25 RX ORDER — SODIUM CHLORIDE, SODIUM LACTATE, POTASSIUM CHLORIDE, CALCIUM CHLORIDE 600; 310; 30; 20 MG/100ML; MG/100ML; MG/100ML; MG/100ML
INJECTION, SOLUTION INTRAVENOUS CONTINUOUS
Status: DISCONTINUED | OUTPATIENT
Start: 2019-01-25 | End: 2019-01-25

## 2019-01-25 RX ORDER — SODIUM CHLORIDE 450 MG/100ML
INJECTION, SOLUTION INTRAVENOUS CONTINUOUS PRN
Status: DISCONTINUED | OUTPATIENT
Start: 2019-01-25 | End: 2019-01-25 | Stop reason: SDUPTHER

## 2019-01-25 RX ORDER — PROPOFOL 10 MG/ML
INJECTION, EMULSION INTRAVENOUS PRN
Status: DISCONTINUED | OUTPATIENT
Start: 2019-01-25 | End: 2019-01-25 | Stop reason: SDUPTHER

## 2019-01-25 RX ORDER — NICOTINE POLACRILEX 4 MG
15 LOZENGE BUCCAL PRN
Status: DISCONTINUED | OUTPATIENT
Start: 2019-01-25 | End: 2019-01-27 | Stop reason: HOSPADM

## 2019-01-25 RX ORDER — ISOSORBIDE MONONITRATE 60 MG/1
120 TABLET, EXTENDED RELEASE ORAL DAILY
Status: DISCONTINUED | OUTPATIENT
Start: 2019-01-26 | End: 2019-01-27 | Stop reason: HOSPADM

## 2019-01-25 RX ORDER — NIFEDIPINE 60 MG/1
60 TABLET, EXTENDED RELEASE ORAL DAILY
Status: DISCONTINUED | OUTPATIENT
Start: 2019-01-26 | End: 2019-01-27 | Stop reason: HOSPADM

## 2019-01-25 RX ORDER — METOCLOPRAMIDE HYDROCHLORIDE 5 MG/ML
10 INJECTION INTRAMUSCULAR; INTRAVENOUS
Status: COMPLETED | OUTPATIENT
Start: 2019-01-25 | End: 2019-01-25

## 2019-01-25 RX ORDER — ONDANSETRON 2 MG/ML
INJECTION INTRAMUSCULAR; INTRAVENOUS
Status: COMPLETED
Start: 2019-01-25 | End: 2019-01-25

## 2019-01-25 RX ORDER — HEPARIN SODIUM 1000 [USP'U]/ML
INJECTION, SOLUTION INTRAVENOUS; SUBCUTANEOUS PRN
Status: DISCONTINUED | OUTPATIENT
Start: 2019-01-25 | End: 2019-01-25 | Stop reason: SDUPTHER

## 2019-01-25 RX ORDER — SUCCINYLCHOLINE CHLORIDE 20 MG/ML
INJECTION INTRAMUSCULAR; INTRAVENOUS PRN
Status: DISCONTINUED | OUTPATIENT
Start: 2019-01-25 | End: 2019-01-25 | Stop reason: SDUPTHER

## 2019-01-25 RX ORDER — MEPERIDINE HYDROCHLORIDE 50 MG/ML
12.5 INJECTION INTRAMUSCULAR; INTRAVENOUS; SUBCUTANEOUS EVERY 5 MIN PRN
Status: DISCONTINUED | OUTPATIENT
Start: 2019-01-25 | End: 2019-01-25 | Stop reason: HOSPADM

## 2019-01-25 RX ORDER — ALBUTEROL SULFATE 90 UG/1
2 AEROSOL, METERED RESPIRATORY (INHALATION) EVERY 6 HOURS PRN
Status: DISCONTINUED | OUTPATIENT
Start: 2019-01-25 | End: 2019-01-27 | Stop reason: HOSPADM

## 2019-01-25 RX ORDER — SODIUM CHLORIDE 0.9 % (FLUSH) 0.9 %
10 SYRINGE (ML) INJECTION PRN
Status: DISCONTINUED | OUTPATIENT
Start: 2019-01-25 | End: 2019-01-27 | Stop reason: HOSPADM

## 2019-01-25 RX ORDER — ROPINIROLE 1 MG/1
2 TABLET, FILM COATED ORAL NIGHTLY
Status: DISCONTINUED | OUTPATIENT
Start: 2019-01-25 | End: 2019-01-27 | Stop reason: HOSPADM

## 2019-01-25 RX ORDER — CLONIDINE HYDROCHLORIDE 0.1 MG/1
0.1 TABLET ORAL
Status: DISCONTINUED | OUTPATIENT
Start: 2019-01-25 | End: 2019-01-27 | Stop reason: HOSPADM

## 2019-01-25 RX ORDER — HYDROCODONE BITARTRATE AND ACETAMINOPHEN 5; 325 MG/1; MG/1
1 TABLET ORAL EVERY 4 HOURS PRN
Status: DISCONTINUED | OUTPATIENT
Start: 2019-01-25 | End: 2019-01-27 | Stop reason: HOSPADM

## 2019-01-25 RX ORDER — MIDAZOLAM HYDROCHLORIDE 1 MG/ML
INJECTION INTRAMUSCULAR; INTRAVENOUS PRN
Status: DISCONTINUED | OUTPATIENT
Start: 2019-01-25 | End: 2019-01-25 | Stop reason: SDUPTHER

## 2019-01-25 RX ORDER — LEVOTHYROXINE SODIUM 0.05 MG/1
150 TABLET ORAL DAILY
Status: DISCONTINUED | OUTPATIENT
Start: 2019-01-26 | End: 2019-01-27 | Stop reason: HOSPADM

## 2019-01-25 RX ORDER — LIDOCAINE HYDROCHLORIDE 10 MG/ML
1 INJECTION, SOLUTION EPIDURAL; INFILTRATION; INTRACAUDAL; PERINEURAL
Status: DISCONTINUED | OUTPATIENT
Start: 2019-01-25 | End: 2019-01-25 | Stop reason: HOSPADM

## 2019-01-25 RX ORDER — HYDRALAZINE HYDROCHLORIDE 20 MG/ML
10 INJECTION INTRAMUSCULAR; INTRAVENOUS
Status: DISCONTINUED | OUTPATIENT
Start: 2019-01-25 | End: 2019-01-27 | Stop reason: HOSPADM

## 2019-01-25 RX ORDER — VANCOMYCIN HYDROCHLORIDE 1 G/200ML
1000 INJECTION, SOLUTION INTRAVENOUS
Status: COMPLETED | OUTPATIENT
Start: 2019-01-25 | End: 2019-01-25

## 2019-01-25 RX ORDER — SODIUM CHLORIDE 0.9 % (FLUSH) 0.9 %
10 SYRINGE (ML) INJECTION EVERY 12 HOURS SCHEDULED
Status: DISCONTINUED | OUTPATIENT
Start: 2019-01-25 | End: 2019-01-27 | Stop reason: HOSPADM

## 2019-01-25 RX ORDER — FENTANYL CITRATE 50 UG/ML
INJECTION, SOLUTION INTRAMUSCULAR; INTRAVENOUS PRN
Status: DISCONTINUED | OUTPATIENT
Start: 2019-01-25 | End: 2019-01-25 | Stop reason: SDUPTHER

## 2019-01-25 RX ORDER — DEXTROSE MONOHYDRATE 50 MG/ML
100 INJECTION, SOLUTION INTRAVENOUS PRN
Status: DISCONTINUED | OUTPATIENT
Start: 2019-01-25 | End: 2019-01-27 | Stop reason: HOSPADM

## 2019-01-25 RX ORDER — ONDANSETRON 2 MG/ML
4 INJECTION INTRAMUSCULAR; INTRAVENOUS EVERY 6 HOURS PRN
Status: DISCONTINUED | OUTPATIENT
Start: 2019-01-25 | End: 2019-01-27 | Stop reason: HOSPADM

## 2019-01-25 RX ORDER — ROCURONIUM BROMIDE 10 MG/ML
INJECTION, SOLUTION INTRAVENOUS PRN
Status: DISCONTINUED | OUTPATIENT
Start: 2019-01-25 | End: 2019-01-25 | Stop reason: SDUPTHER

## 2019-01-25 RX ADMIN — SODIUM CHLORIDE: 4.5 INJECTION, SOLUTION INTRAVENOUS at 10:18

## 2019-01-25 RX ADMIN — SUCCINYLCHOLINE CHLORIDE 100 MG: 20 INJECTION, SOLUTION INTRAMUSCULAR; INTRAVENOUS; PARENTERAL at 13:13

## 2019-01-25 RX ADMIN — SODIUM CHLORIDE: 4.5 INJECTION, SOLUTION INTRAVENOUS at 15:05

## 2019-01-25 RX ADMIN — ONDANSETRON 4 MG: 2 INJECTION INTRAMUSCULAR; INTRAVENOUS at 18:17

## 2019-01-25 RX ADMIN — LABETALOL 20 MG/4 ML (5 MG/ML) INTRAVENOUS SYRINGE 5 MG: at 14:40

## 2019-01-25 RX ADMIN — LABETALOL 20 MG/4 ML (5 MG/ML) INTRAVENOUS SYRINGE 5 MG: at 14:04

## 2019-01-25 RX ADMIN — Medication 10 ML: at 21:20

## 2019-01-25 RX ADMIN — PROPOFOL 150 MG: 10 INJECTION, EMULSION INTRAVENOUS at 13:13

## 2019-01-25 RX ADMIN — HYDROMORPHONE HYDROCHLORIDE 0.5 MG: 1 INJECTION, SOLUTION INTRAMUSCULAR; INTRAVENOUS; SUBCUTANEOUS at 14:45

## 2019-01-25 RX ADMIN — ONDANSETRON HYDROCHLORIDE 4 MG: 2 INJECTION, SOLUTION INTRAMUSCULAR; INTRAVENOUS at 14:50

## 2019-01-25 RX ADMIN — SODIUM CHLORIDE: 4.5 INJECTION, SOLUTION INTRAVENOUS at 13:09

## 2019-01-25 RX ADMIN — HYDROMORPHONE HYDROCHLORIDE 0.5 MG: 1 INJECTION, SOLUTION INTRAMUSCULAR; INTRAVENOUS; SUBCUTANEOUS at 15:55

## 2019-01-25 RX ADMIN — HYDRALAZINE HYDROCHLORIDE 10 MG: 20 INJECTION INTRAMUSCULAR; INTRAVENOUS at 21:18

## 2019-01-25 RX ADMIN — DULOXETINE HYDROCHLORIDE 60 MG: 60 CAPSULE, DELAYED RELEASE ORAL at 21:20

## 2019-01-25 RX ADMIN — LABETALOL 20 MG/4 ML (5 MG/ML) INTRAVENOUS SYRINGE 5 MG: at 13:52

## 2019-01-25 RX ADMIN — METOPROLOL TARTRATE 100 MG: 50 TABLET ORAL at 21:19

## 2019-01-25 RX ADMIN — HYDROMORPHONE HYDROCHLORIDE 0.5 MG: 1 INJECTION, SOLUTION INTRAMUSCULAR; INTRAVENOUS; SUBCUTANEOUS at 16:10

## 2019-01-25 RX ADMIN — FENTANYL CITRATE 50 MCG: 50 INJECTION INTRAMUSCULAR; INTRAVENOUS at 13:13

## 2019-01-25 RX ADMIN — MIDAZOLAM 2 MG: 1 INJECTION INTRAMUSCULAR; INTRAVENOUS at 13:07

## 2019-01-25 RX ADMIN — EPHEDRINE SULFATE 5 MG: 50 INJECTION, SOLUTION INTRAMUSCULAR; INTRAVENOUS; SUBCUTANEOUS at 15:41

## 2019-01-25 RX ADMIN — ROCURONIUM BROMIDE 30 MG: 10 INJECTION INTRAVENOUS at 13:38

## 2019-01-25 RX ADMIN — ASPIRIN 81 MG: 81 TABLET, COATED ORAL at 10:17

## 2019-01-25 RX ADMIN — HYDRALAZINE HYDROCHLORIDE 5 MG: 20 INJECTION INTRAMUSCULAR; INTRAVENOUS at 17:02

## 2019-01-25 RX ADMIN — LABETALOL 20 MG/4 ML (5 MG/ML) INTRAVENOUS SYRINGE 5 MG: at 14:16

## 2019-01-25 RX ADMIN — VANCOMYCIN HYDROCHLORIDE 1000 MG: 1 INJECTION, SOLUTION INTRAVENOUS at 11:52

## 2019-01-25 RX ADMIN — LIDOCAINE HYDROCHLORIDE 50 MG: 10 INJECTION, SOLUTION EPIDURAL; INFILTRATION; INTRACAUDAL; PERINEURAL at 13:13

## 2019-01-25 RX ADMIN — SIMVASTATIN 40 MG: 40 TABLET, FILM COATED ORAL at 21:20

## 2019-01-25 RX ADMIN — PROTAMINE SULFATE 30 MG: 10 INJECTION, SOLUTION INTRAVENOUS at 14:55

## 2019-01-25 RX ADMIN — ROCURONIUM BROMIDE 10 MG: 10 INJECTION INTRAVENOUS at 14:40

## 2019-01-25 RX ADMIN — HYDROCODONE BITARTRATE AND ACETAMINOPHEN 2 TABLET: 5; 325 TABLET ORAL at 21:19

## 2019-01-25 RX ADMIN — DEXAMETHASONE SODIUM PHOSPHATE 10 MG: 10 INJECTION INTRAMUSCULAR; INTRAVENOUS at 13:22

## 2019-01-25 RX ADMIN — METOCLOPRAMIDE 10 MG: 5 INJECTION, SOLUTION INTRAMUSCULAR; INTRAVENOUS at 10:58

## 2019-01-25 RX ADMIN — HYDROMORPHONE HYDROCHLORIDE 0.5 MG: 1 INJECTION, SOLUTION INTRAMUSCULAR; INTRAVENOUS; SUBCUTANEOUS at 15:16

## 2019-01-25 RX ADMIN — METOCLOPRAMIDE HYDROCHLORIDE 10 MG: 5 INJECTION INTRAMUSCULAR; INTRAVENOUS at 10:58

## 2019-01-25 RX ADMIN — ROPINIROLE HYDROCHLORIDE 2 MG: 1 TABLET, FILM COATED ORAL at 21:33

## 2019-01-25 RX ADMIN — LABETALOL 20 MG/4 ML (5 MG/ML) INTRAVENOUS SYRINGE 5 MG: at 15:10

## 2019-01-25 RX ADMIN — HEPARIN SODIUM 3000 UNITS: 1000 INJECTION, SOLUTION INTRAVENOUS; SUBCUTANEOUS at 14:24

## 2019-01-25 RX ADMIN — INSULIN LISPRO 6 UNITS: 100 INJECTION, SOLUTION INTRAVENOUS; SUBCUTANEOUS at 21:21

## 2019-01-25 RX ADMIN — FENTANYL CITRATE 50 MCG: 50 INJECTION INTRAMUSCULAR; INTRAVENOUS at 13:49

## 2019-01-25 RX ADMIN — ROCURONIUM BROMIDE 5 MG: 10 INJECTION INTRAVENOUS at 13:13

## 2019-01-25 RX ADMIN — METOCLOPRAMIDE 10 MG: 5 INJECTION, SOLUTION INTRAMUSCULAR; INTRAVENOUS at 17:29

## 2019-01-25 ASSESSMENT — PAIN DESCRIPTION - PROGRESSION
CLINICAL_PROGRESSION: GRADUALLY WORSENING
CLINICAL_PROGRESSION: GRADUALLY IMPROVING

## 2019-01-25 ASSESSMENT — ENCOUNTER SYMPTOMS: SHORTNESS OF BREATH: 0

## 2019-01-25 ASSESSMENT — PAIN DESCRIPTION - ONSET: ONSET: ON-GOING

## 2019-01-25 ASSESSMENT — PAIN DESCRIPTION - ORIENTATION: ORIENTATION: LEFT

## 2019-01-25 ASSESSMENT — PAIN DESCRIPTION - FREQUENCY: FREQUENCY: CONTINUOUS

## 2019-01-25 ASSESSMENT — PAIN SCALES - GENERAL
PAINLEVEL_OUTOF10: 0
PAINLEVEL_OUTOF10: 3
PAINLEVEL_OUTOF10: 7

## 2019-01-25 ASSESSMENT — LIFESTYLE VARIABLES: SMOKING_STATUS: 1

## 2019-01-25 ASSESSMENT — PAIN DESCRIPTION - PAIN TYPE: TYPE: ACUTE PAIN;SURGICAL PAIN

## 2019-01-25 ASSESSMENT — PAIN DESCRIPTION - LOCATION: LOCATION: ARM

## 2019-01-26 PROBLEM — D64.9 NORMOCYTIC ANEMIA: Status: ACTIVE | Noted: 2019-01-26

## 2019-01-26 PROBLEM — E11.65 HYPERGLYCEMIA DUE TO TYPE 2 DIABETES MELLITUS (HCC): Status: ACTIVE | Noted: 2019-01-26

## 2019-01-26 PROBLEM — E87.1 HYPONATREMIA: Status: ACTIVE | Noted: 2019-01-26

## 2019-01-26 LAB
ANION GAP SERPL CALCULATED.3IONS-SCNC: 14 MMOL/L (ref 7–19)
BUN BLDV-MCNC: 46 MG/DL (ref 6–20)
CALCIUM SERPL-MCNC: 8.7 MG/DL (ref 8.6–10)
CHLORIDE BLD-SCNC: 83 MMOL/L (ref 98–111)
CO2: 26 MMOL/L (ref 22–29)
CREAT SERPL-MCNC: 5.3 MG/DL (ref 0.5–0.9)
GFR NON-AFRICAN AMERICAN: 9
GLUCOSE BLD-MCNC: 169 MG/DL (ref 70–99)
GLUCOSE BLD-MCNC: 178 MG/DL (ref 70–99)
GLUCOSE BLD-MCNC: 418 MG/DL (ref 70–99)
GLUCOSE BLD-MCNC: 447 MG/DL (ref 70–99)
GLUCOSE BLD-MCNC: 527 MG/DL (ref 70–99)
GLUCOSE BLD-MCNC: 540 MG/DL (ref 74–109)
HCT VFR BLD CALC: 31.3 % (ref 37–47)
HEMOGLOBIN: 9.9 G/DL (ref 12–16)
MCH RBC QN AUTO: 30.6 PG (ref 27–31)
MCHC RBC AUTO-ENTMCNC: 31.6 G/DL (ref 33–37)
MCV RBC AUTO: 96.6 FL (ref 81–99)
PDW BLD-RTO: 13.6 % (ref 11.5–14.5)
PERFORMED ON: ABNORMAL
PLATELET # BLD: 144 K/UL (ref 130–400)
PMV BLD AUTO: 10.3 FL (ref 9.4–12.3)
POTASSIUM SERPL-SCNC: 6.3 MMOL/L (ref 3.5–5)
RBC # BLD: 3.24 M/UL (ref 4.2–5.4)
SODIUM BLD-SCNC: 123 MMOL/L (ref 136–145)
WBC # BLD: 6.7 K/UL (ref 4.8–10.8)

## 2019-01-26 PROCEDURE — 1210000000 HC MED SURG R&B

## 2019-01-26 PROCEDURE — 99024 POSTOP FOLLOW-UP VISIT: CPT | Performed by: SURGERY

## 2019-01-26 PROCEDURE — 85027 COMPLETE CBC AUTOMATED: CPT

## 2019-01-26 PROCEDURE — 80048 BASIC METABOLIC PNL TOTAL CA: CPT

## 2019-01-26 PROCEDURE — 6360000002 HC RX W HCPCS: Performed by: SURGERY

## 2019-01-26 PROCEDURE — 6370000000 HC RX 637 (ALT 250 FOR IP): Performed by: CLINICAL NURSE SPECIALIST

## 2019-01-26 PROCEDURE — 6370000000 HC RX 637 (ALT 250 FOR IP): Performed by: FAMILY MEDICINE

## 2019-01-26 PROCEDURE — 8010000000 HC HEMODIALYSIS ACUTE INPT

## 2019-01-26 PROCEDURE — 6360000002 HC RX W HCPCS: Performed by: INTERNAL MEDICINE

## 2019-01-26 PROCEDURE — 5A1D70Z PERFORMANCE OF URINARY FILTRATION, INTERMITTENT, LESS THAN 6 HOURS PER DAY: ICD-10-PCS | Performed by: INTERNAL MEDICINE

## 2019-01-26 PROCEDURE — 36415 COLL VENOUS BLD VENIPUNCTURE: CPT

## 2019-01-26 PROCEDURE — 82948 REAGENT STRIP/BLOOD GLUCOSE: CPT

## 2019-01-26 PROCEDURE — 2580000003 HC RX 258: Performed by: SURGERY

## 2019-01-26 PROCEDURE — 2700000000 HC OXYGEN THERAPY PER DAY

## 2019-01-26 PROCEDURE — 99222 1ST HOSP IP/OBS MODERATE 55: CPT | Performed by: FAMILY MEDICINE

## 2019-01-26 PROCEDURE — 6370000000 HC RX 637 (ALT 250 FOR IP): Performed by: SURGERY

## 2019-01-26 RX ORDER — DEXTROSE MONOHYDRATE 25 G/50ML
12.5 INJECTION, SOLUTION INTRAVENOUS PRN
Status: DISCONTINUED | OUTPATIENT
Start: 2019-01-26 | End: 2019-01-27 | Stop reason: HOSPADM

## 2019-01-26 RX ORDER — INSULIN GLARGINE 100 [IU]/ML
15 INJECTION, SOLUTION SUBCUTANEOUS ONCE
Status: COMPLETED | OUTPATIENT
Start: 2019-01-26 | End: 2019-01-26

## 2019-01-26 RX ORDER — NICOTINE POLACRILEX 4 MG
15 LOZENGE BUCCAL PRN
Status: DISCONTINUED | OUTPATIENT
Start: 2019-01-26 | End: 2019-01-27 | Stop reason: HOSPADM

## 2019-01-26 RX ORDER — DEXTROSE MONOHYDRATE 25 G/50ML
25 INJECTION, SOLUTION INTRAVENOUS ONCE
Status: DISCONTINUED | OUTPATIENT
Start: 2019-01-26 | End: 2019-01-27 | Stop reason: HOSPADM

## 2019-01-26 RX ORDER — HEPARIN SODIUM 1000 [USP'U]/ML
1600 INJECTION, SOLUTION INTRAVENOUS; SUBCUTANEOUS PRN
Status: DISCONTINUED | OUTPATIENT
Start: 2019-01-26 | End: 2019-01-27 | Stop reason: HOSPADM

## 2019-01-26 RX ORDER — DEXTROSE MONOHYDRATE 50 MG/ML
100 INJECTION, SOLUTION INTRAVENOUS PRN
Status: DISCONTINUED | OUTPATIENT
Start: 2019-01-26 | End: 2019-01-27 | Stop reason: HOSPADM

## 2019-01-26 RX ORDER — SODIUM POLYSTYRENE SULFONATE 15 G/60ML
30 SUSPENSION ORAL; RECTAL ONCE
Status: COMPLETED | OUTPATIENT
Start: 2019-01-26 | End: 2019-01-26

## 2019-01-26 RX ORDER — INSULIN GLARGINE 100 [IU]/ML
15 INJECTION, SOLUTION SUBCUTANEOUS NIGHTLY
Status: DISCONTINUED | OUTPATIENT
Start: 2019-01-26 | End: 2019-01-27 | Stop reason: HOSPADM

## 2019-01-26 RX ORDER — HEPARIN SODIUM 5000 [USP'U]/ML
5000 INJECTION, SOLUTION INTRAVENOUS; SUBCUTANEOUS EVERY 8 HOURS SCHEDULED
Status: DISCONTINUED | OUTPATIENT
Start: 2019-01-26 | End: 2019-01-27 | Stop reason: HOSPADM

## 2019-01-26 RX ADMIN — HEPARIN SODIUM 1600 UNITS: 1000 INJECTION, SOLUTION INTRAVENOUS; SUBCUTANEOUS at 15:45

## 2019-01-26 RX ADMIN — Medication 30 G: at 09:08

## 2019-01-26 RX ADMIN — METOPROLOL TARTRATE 100 MG: 50 TABLET ORAL at 21:41

## 2019-01-26 RX ADMIN — ROPINIROLE HYDROCHLORIDE 2 MG: 1 TABLET, FILM COATED ORAL at 21:41

## 2019-01-26 RX ADMIN — INSULIN LISPRO 12 UNITS: 100 INJECTION, SOLUTION INTRAVENOUS; SUBCUTANEOUS at 06:43

## 2019-01-26 RX ADMIN — HYDROCODONE BITARTRATE AND ACETAMINOPHEN 2 TABLET: 5; 325 TABLET ORAL at 03:32

## 2019-01-26 RX ADMIN — HYDRALAZINE HYDROCHLORIDE 10 MG: 20 INJECTION INTRAMUSCULAR; INTRAVENOUS at 05:56

## 2019-01-26 RX ADMIN — Medication 10 ML: at 09:42

## 2019-01-26 RX ADMIN — DULOXETINE HYDROCHLORIDE 60 MG: 60 CAPSULE, DELAYED RELEASE ORAL at 21:41

## 2019-01-26 RX ADMIN — ONDANSETRON 4 MG: 2 INJECTION INTRAMUSCULAR; INTRAVENOUS at 18:57

## 2019-01-26 RX ADMIN — NIFEDIPINE 60 MG: 60 TABLET, FILM COATED, EXTENDED RELEASE ORAL at 16:20

## 2019-01-26 RX ADMIN — SIMVASTATIN 40 MG: 40 TABLET, FILM COATED ORAL at 21:41

## 2019-01-26 RX ADMIN — INSULIN GLARGINE 15 UNITS: 100 INJECTION, SOLUTION SUBCUTANEOUS at 09:41

## 2019-01-26 RX ADMIN — Medication 10 ML: at 19:00

## 2019-01-26 RX ADMIN — ISOSORBIDE MONONITRATE 120 MG: 60 TABLET, EXTENDED RELEASE ORAL at 16:20

## 2019-01-26 RX ADMIN — LOSARTAN POTASSIUM 25 MG: 25 TABLET ORAL at 16:20

## 2019-01-26 RX ADMIN — HEPARIN SODIUM 1600 UNITS: 1000 INJECTION, SOLUTION INTRAVENOUS; SUBCUTANEOUS at 15:46

## 2019-01-26 RX ADMIN — Medication 0.5 MG: at 18:57

## 2019-01-26 RX ADMIN — CLONIDINE HYDROCHLORIDE 0.1 MG: 0.1 TABLET ORAL at 03:32

## 2019-01-26 RX ADMIN — METOPROLOL TARTRATE 100 MG: 50 TABLET ORAL at 16:20

## 2019-01-26 RX ADMIN — HEPARIN SODIUM 5000 UNITS: 5000 INJECTION INTRAVENOUS; SUBCUTANEOUS at 21:40

## 2019-01-26 RX ADMIN — HYDROCODONE BITARTRATE AND ACETAMINOPHEN 1 TABLET: 5; 325 TABLET ORAL at 21:48

## 2019-01-26 RX ADMIN — HYDROCODONE BITARTRATE AND ACETAMINOPHEN 2 TABLET: 5; 325 TABLET ORAL at 16:20

## 2019-01-26 RX ADMIN — INSULIN LISPRO 3 UNITS: 100 INJECTION, SOLUTION INTRAVENOUS; SUBCUTANEOUS at 18:35

## 2019-01-26 ASSESSMENT — PAIN DESCRIPTION - PAIN TYPE
TYPE: ACUTE PAIN;SURGICAL PAIN
TYPE: ACUTE PAIN
TYPE: ACUTE PAIN
TYPE: ACUTE PAIN;SURGICAL PAIN

## 2019-01-26 ASSESSMENT — PAIN SCALES - GENERAL
PAINLEVEL_OUTOF10: 0
PAINLEVEL_OUTOF10: 10
PAINLEVEL_OUTOF10: 2
PAINLEVEL_OUTOF10: 8
PAINLEVEL_OUTOF10: 6
PAINLEVEL_OUTOF10: 7
PAINLEVEL_OUTOF10: 5
PAINLEVEL_OUTOF10: 0

## 2019-01-26 ASSESSMENT — PAIN DESCRIPTION - ORIENTATION
ORIENTATION: LEFT

## 2019-01-26 ASSESSMENT — PAIN DESCRIPTION - LOCATION
LOCATION: ARM

## 2019-01-27 VITALS
OXYGEN SATURATION: 96 % | BODY MASS INDEX: 25 KG/M2 | DIASTOLIC BLOOD PRESSURE: 73 MMHG | WEIGHT: 155.56 LBS | RESPIRATION RATE: 16 BRPM | HEIGHT: 66 IN | HEART RATE: 68 BPM | TEMPERATURE: 99.1 F | SYSTOLIC BLOOD PRESSURE: 179 MMHG

## 2019-01-27 LAB
ANION GAP SERPL CALCULATED.3IONS-SCNC: 14 MMOL/L (ref 7–19)
BUN BLDV-MCNC: 22 MG/DL (ref 6–20)
CALCIUM SERPL-MCNC: 8.7 MG/DL (ref 8.6–10)
CHLORIDE BLD-SCNC: 96 MMOL/L (ref 98–111)
CO2: 27 MMOL/L (ref 22–29)
CREAT SERPL-MCNC: 3.8 MG/DL (ref 0.5–0.9)
GFR NON-AFRICAN AMERICAN: 13
GLUCOSE BLD-MCNC: 167 MG/DL (ref 74–109)
GLUCOSE BLD-MCNC: 168 MG/DL (ref 70–99)
GLUCOSE BLD-MCNC: 282 MG/DL (ref 70–99)
MAGNESIUM: 2.2 MG/DL (ref 1.6–2.6)
PARATHYROID HORMONE INTACT: 206.4 PG/ML (ref 15–65)
PERFORMED ON: ABNORMAL
PERFORMED ON: ABNORMAL
PHOSPHORUS: 5 MG/DL (ref 2.5–4.5)
POTASSIUM SERPL-SCNC: 4.3 MMOL/L (ref 3.5–5)
SODIUM BLD-SCNC: 137 MMOL/L (ref 136–145)
URIC ACID, SERUM: 3.4 MG/DL (ref 2.4–5.7)
VITAMIN D 25-HYDROXY: 20 NG/ML

## 2019-01-27 PROCEDURE — 82948 REAGENT STRIP/BLOOD GLUCOSE: CPT

## 2019-01-27 PROCEDURE — 84550 ASSAY OF BLOOD/URIC ACID: CPT

## 2019-01-27 PROCEDURE — 6360000002 HC RX W HCPCS: Performed by: SURGERY

## 2019-01-27 PROCEDURE — 99024 POSTOP FOLLOW-UP VISIT: CPT | Performed by: SURGERY

## 2019-01-27 PROCEDURE — 6370000000 HC RX 637 (ALT 250 FOR IP): Performed by: FAMILY MEDICINE

## 2019-01-27 PROCEDURE — 82306 VITAMIN D 25 HYDROXY: CPT

## 2019-01-27 PROCEDURE — 84100 ASSAY OF PHOSPHORUS: CPT

## 2019-01-27 PROCEDURE — 80048 BASIC METABOLIC PNL TOTAL CA: CPT

## 2019-01-27 PROCEDURE — 36415 COLL VENOUS BLD VENIPUNCTURE: CPT

## 2019-01-27 PROCEDURE — 83970 ASSAY OF PARATHORMONE: CPT

## 2019-01-27 PROCEDURE — 99238 HOSP IP/OBS DSCHRG MGMT 30/<: CPT | Performed by: FAMILY MEDICINE

## 2019-01-27 PROCEDURE — 6370000000 HC RX 637 (ALT 250 FOR IP): Performed by: SURGERY

## 2019-01-27 PROCEDURE — 83735 ASSAY OF MAGNESIUM: CPT

## 2019-01-27 RX ORDER — HYDROCODONE BITARTRATE AND ACETAMINOPHEN 5; 325 MG/1; MG/1
2 TABLET ORAL EVERY 6 HOURS PRN
Qty: 30 TABLET | Refills: 0
Start: 2019-01-27 | End: 2019-01-30

## 2019-01-27 RX ORDER — LOSARTAN POTASSIUM 50 MG/1
50 TABLET ORAL DAILY
Status: DISCONTINUED | OUTPATIENT
Start: 2019-01-28 | End: 2019-01-27 | Stop reason: HOSPADM

## 2019-01-27 RX ADMIN — HYDROCODONE BITARTRATE AND ACETAMINOPHEN 2 TABLET: 5; 325 TABLET ORAL at 06:22

## 2019-01-27 RX ADMIN — ISOSORBIDE MONONITRATE 120 MG: 60 TABLET, EXTENDED RELEASE ORAL at 09:26

## 2019-01-27 RX ADMIN — HEPARIN SODIUM 5000 UNITS: 5000 INJECTION INTRAVENOUS; SUBCUTANEOUS at 06:22

## 2019-01-27 RX ADMIN — NIFEDIPINE 60 MG: 60 TABLET, FILM COATED, EXTENDED RELEASE ORAL at 09:26

## 2019-01-27 RX ADMIN — LOSARTAN POTASSIUM 25 MG: 25 TABLET ORAL at 09:26

## 2019-01-27 RX ADMIN — METOPROLOL TARTRATE 100 MG: 50 TABLET ORAL at 09:26

## 2019-01-27 RX ADMIN — INSULIN LISPRO 3 UNITS: 100 INJECTION, SOLUTION INTRAVENOUS; SUBCUTANEOUS at 09:27

## 2019-01-27 RX ADMIN — LEVOTHYROXINE SODIUM 150 MCG: 50 TABLET ORAL at 06:22

## 2019-01-27 ASSESSMENT — PAIN SCALES - GENERAL
PAINLEVEL_OUTOF10: 3
PAINLEVEL_OUTOF10: 5
PAINLEVEL_OUTOF10: 7

## 2019-01-27 ASSESSMENT — PAIN DESCRIPTION - ORIENTATION: ORIENTATION: LEFT

## 2019-01-27 ASSESSMENT — PAIN DESCRIPTION - LOCATION: LOCATION: ARM

## 2019-01-27 ASSESSMENT — PAIN DESCRIPTION - PAIN TYPE
TYPE: ACUTE PAIN
TYPE: ACUTE PAIN

## 2019-02-11 ENCOUNTER — OFFICE VISIT (OUTPATIENT)
Dept: VASCULAR SURGERY | Age: 49
End: 2019-02-11

## 2019-02-11 ENCOUNTER — HOSPITAL ENCOUNTER (OUTPATIENT)
Dept: CT IMAGING | Age: 49
Discharge: HOME | End: 2019-02-11
Payer: MEDICARE

## 2019-02-11 VITALS
DIASTOLIC BLOOD PRESSURE: 77 MMHG | TEMPERATURE: 98 F | SYSTOLIC BLOOD PRESSURE: 139 MMHG | RESPIRATION RATE: 18 BRPM | HEART RATE: 66 BPM

## 2019-02-11 DIAGNOSIS — R91.8 LUNG MASS: ICD-10-CM

## 2019-02-11 DIAGNOSIS — N18.6 ESRD ON HEMODIALYSIS (HCC): Primary | ICD-10-CM

## 2019-02-11 DIAGNOSIS — Z99.2 ESRD ON HEMODIALYSIS (HCC): Primary | ICD-10-CM

## 2019-02-11 PROCEDURE — 99024 POSTOP FOLLOW-UP VISIT: CPT | Performed by: NURSE PRACTITIONER

## 2019-02-11 PROCEDURE — 71250 CT THORAX DX C-: CPT

## 2019-02-28 ENCOUNTER — TELEPHONE (OUTPATIENT)
Dept: VASCULAR SURGERY | Age: 49
End: 2019-02-28

## 2019-03-05 ENCOUNTER — PREP FOR PROCEDURE (OUTPATIENT)
Dept: VASCULAR SURGERY | Age: 49
End: 2019-03-05

## 2019-03-05 RX ORDER — LIDOCAINE HYDROCHLORIDE 10 MG/ML
20 INJECTION, SOLUTION EPIDURAL; INFILTRATION; INTRACAUDAL; PERINEURAL ONCE
Status: CANCELLED | OUTPATIENT
Start: 2019-03-05 | End: 2019-03-05

## 2019-03-06 ENCOUNTER — HOSPITAL ENCOUNTER (OUTPATIENT)
Dept: OTHER | Age: 49
Discharge: HOME | End: 2019-03-06
Payer: MEDICARE

## 2019-03-06 VITALS
SYSTOLIC BLOOD PRESSURE: 143 MMHG | HEIGHT: 66 IN | BODY MASS INDEX: 24.11 KG/M2 | DIASTOLIC BLOOD PRESSURE: 65 MMHG | WEIGHT: 150 LBS | TEMPERATURE: 96.1 F | RESPIRATION RATE: 18 BRPM | OXYGEN SATURATION: 97 % | HEART RATE: 62 BPM

## 2019-03-06 PROCEDURE — 36589 REMOVAL TUNNELED CV CATH: CPT | Performed by: SURGERY

## 2019-03-06 PROCEDURE — 6370000000 HC RX 637 (ALT 250 FOR IP): Performed by: SURGERY

## 2019-03-06 PROCEDURE — 2500000003 HC RX 250 WO HCPCS: Performed by: SURGERY

## 2019-03-06 RX ORDER — ACETAMINOPHEN 325 MG/1
650 TABLET ORAL EVERY 4 HOURS PRN
Status: DISCONTINUED | OUTPATIENT
Start: 2019-03-06 | End: 2019-03-08 | Stop reason: HOSPADM

## 2019-03-06 RX ORDER — ONDANSETRON 2 MG/ML
4 INJECTION INTRAMUSCULAR; INTRAVENOUS EVERY 8 HOURS PRN
Status: DISCONTINUED | OUTPATIENT
Start: 2019-03-06 | End: 2019-03-08 | Stop reason: HOSPADM

## 2019-03-06 RX ORDER — LIDOCAINE HYDROCHLORIDE 10 MG/ML
20 INJECTION, SOLUTION EPIDURAL; INFILTRATION; INTRACAUDAL; PERINEURAL ONCE
Status: DISCONTINUED | OUTPATIENT
Start: 2019-03-06 | End: 2019-03-06

## 2019-03-06 RX ORDER — HYDROCODONE BITARTRATE AND ACETAMINOPHEN 5; 325 MG/1; MG/1
1 TABLET ORAL EVERY 4 HOURS PRN
Status: DISCONTINUED | OUTPATIENT
Start: 2019-03-06 | End: 2019-03-08 | Stop reason: HOSPADM

## 2019-03-06 RX ORDER — LIDOCAINE HYDROCHLORIDE 10 MG/ML
30 INJECTION, SOLUTION EPIDURAL; INFILTRATION; INTRACAUDAL; PERINEURAL ONCE
Status: COMPLETED | OUTPATIENT
Start: 2019-03-06 | End: 2019-03-06

## 2019-03-06 RX ORDER — HYDROCODONE BITARTRATE AND ACETAMINOPHEN 5; 325 MG/1; MG/1
2 TABLET ORAL EVERY 4 HOURS PRN
Status: DISCONTINUED | OUTPATIENT
Start: 2019-03-06 | End: 2019-03-08 | Stop reason: HOSPADM

## 2019-03-06 RX ADMIN — HYDROCODONE BITARTRATE AND ACETAMINOPHEN 2 TABLET: 5; 325 TABLET ORAL at 08:16

## 2019-03-06 RX ADMIN — LIDOCAINE HYDROCHLORIDE 10 ML: 10 INJECTION, SOLUTION EPIDURAL; INFILTRATION; INTRACAUDAL; PERINEURAL at 07:30

## 2019-03-06 ASSESSMENT — PAIN SCALES - GENERAL: PAINLEVEL_OUTOF10: 8

## 2019-03-12 ENCOUNTER — TELEPHONE (OUTPATIENT)
Dept: GASTROENTEROLOGY | Age: 49
End: 2019-03-12

## 2019-03-24 ENCOUNTER — APPOINTMENT (OUTPATIENT)
Dept: GENERAL RADIOLOGY | Age: 49
DRG: 640 | End: 2019-03-24
Payer: MEDICARE

## 2019-03-24 ENCOUNTER — HOSPITAL ENCOUNTER (EMERGENCY)
Age: 49
Discharge: HOME | DRG: 640 | End: 2019-03-25
Attending: EMERGENCY MEDICINE
Payer: MEDICARE

## 2019-03-24 VITALS
DIASTOLIC BLOOD PRESSURE: 88 MMHG | HEART RATE: 71 BPM | TEMPERATURE: 99.2 F | OXYGEN SATURATION: 94 % | SYSTOLIC BLOOD PRESSURE: 186 MMHG | RESPIRATION RATE: 20 BRPM

## 2019-03-24 DIAGNOSIS — I10 ESSENTIAL HYPERTENSION: ICD-10-CM

## 2019-03-24 DIAGNOSIS — N18.6 ESRD (END STAGE RENAL DISEASE) (HCC): Primary | ICD-10-CM

## 2019-03-24 DIAGNOSIS — F41.1 ANXIETY STATE: ICD-10-CM

## 2019-03-24 LAB
ALBUMIN SERPL-MCNC: 4.4 G/DL (ref 3.5–5.2)
ALP BLD-CCNC: 171 U/L (ref 35–104)
ALT SERPL-CCNC: 40 U/L (ref 5–33)
ANION GAP SERPL CALCULATED.3IONS-SCNC: 21 MMOL/L (ref 7–19)
AST SERPL-CCNC: 41 U/L (ref 5–32)
BASOPHILS ABSOLUTE: 0.1 K/UL (ref 0–0.2)
BASOPHILS RELATIVE PERCENT: 1.5 % (ref 0–1)
BILIRUB SERPL-MCNC: 0.6 MG/DL (ref 0.2–1.2)
BUN BLDV-MCNC: 42 MG/DL (ref 6–20)
CALCIUM SERPL-MCNC: 10.2 MG/DL (ref 8.6–10)
CHLORIDE BLD-SCNC: 86 MMOL/L (ref 98–111)
CO2: 23 MMOL/L (ref 22–29)
CREAT SERPL-MCNC: 4.9 MG/DL (ref 0.5–0.9)
D DIMER: 0.72 UG/ML FEU (ref 0–0.48)
EOSINOPHILS ABSOLUTE: 0.1 K/UL (ref 0–0.6)
EOSINOPHILS RELATIVE PERCENT: 1.9 % (ref 0–5)
GFR NON-AFRICAN AMERICAN: 9
GLUCOSE BLD-MCNC: 274 MG/DL (ref 74–109)
HCT VFR BLD CALC: 38.9 % (ref 37–47)
HEMOGLOBIN: 12.7 G/DL (ref 12–16)
LYMPHOCYTES ABSOLUTE: 1.8 K/UL (ref 1.1–4.5)
LYMPHOCYTES RELATIVE PERCENT: 28.4 % (ref 20–40)
MCH RBC QN AUTO: 29.9 PG (ref 27–31)
MCHC RBC AUTO-ENTMCNC: 32.6 G/DL (ref 33–37)
MCV RBC AUTO: 91.5 FL (ref 81–99)
MONOCYTES ABSOLUTE: 0.4 K/UL (ref 0–0.9)
MONOCYTES RELATIVE PERCENT: 5.7 % (ref 0–10)
NEUTROPHILS ABSOLUTE: 3.9 K/UL (ref 1.5–7.5)
NEUTROPHILS RELATIVE PERCENT: 62.3 % (ref 50–65)
PDW BLD-RTO: 13.3 % (ref 11.5–14.5)
PLATELET # BLD: 170 K/UL (ref 130–400)
PMV BLD AUTO: 10.6 FL (ref 9.4–12.3)
POTASSIUM SERPL-SCNC: 4.5 MMOL/L (ref 3.5–5)
PRO-BNP: ABNORMAL PG/ML (ref 0–900)
RBC # BLD: 4.25 M/UL (ref 4.2–5.4)
SODIUM BLD-SCNC: 130 MMOL/L (ref 136–145)
TOTAL PROTEIN: 8 G/DL (ref 6.6–8.7)
TROPONIN: 0.02 NG/ML (ref 0–0.03)
TROPONIN: 0.02 NG/ML (ref 0–0.03)
WBC # BLD: 6.2 K/UL (ref 4.8–10.8)

## 2019-03-24 PROCEDURE — 99285 EMERGENCY DEPT VISIT HI MDM: CPT

## 2019-03-24 PROCEDURE — 6370000000 HC RX 637 (ALT 250 FOR IP): Performed by: EMERGENCY MEDICINE

## 2019-03-24 PROCEDURE — 71045 X-RAY EXAM CHEST 1 VIEW: CPT

## 2019-03-24 PROCEDURE — 36415 COLL VENOUS BLD VENIPUNCTURE: CPT

## 2019-03-24 PROCEDURE — 2500000003 HC RX 250 WO HCPCS: Performed by: EMERGENCY MEDICINE

## 2019-03-24 PROCEDURE — 6370000000 HC RX 637 (ALT 250 FOR IP)

## 2019-03-24 PROCEDURE — 84484 ASSAY OF TROPONIN QUANT: CPT

## 2019-03-24 PROCEDURE — 96375 TX/PRO/DX INJ NEW DRUG ADDON: CPT

## 2019-03-24 PROCEDURE — 83880 ASSAY OF NATRIURETIC PEPTIDE: CPT

## 2019-03-24 PROCEDURE — 6360000002 HC RX W HCPCS: Performed by: EMERGENCY MEDICINE

## 2019-03-24 PROCEDURE — 85025 COMPLETE CBC W/AUTO DIFF WBC: CPT

## 2019-03-24 PROCEDURE — 85379 FIBRIN DEGRADATION QUANT: CPT

## 2019-03-24 PROCEDURE — 93005 ELECTROCARDIOGRAM TRACING: CPT

## 2019-03-24 PROCEDURE — 96374 THER/PROPH/DIAG INJ IV PUSH: CPT

## 2019-03-24 PROCEDURE — 80053 COMPREHEN METABOLIC PANEL: CPT

## 2019-03-24 RX ORDER — ACETAMINOPHEN 325 MG/1
TABLET ORAL
Status: COMPLETED
Start: 2019-03-24 | End: 2019-03-24

## 2019-03-24 RX ORDER — LABETALOL HYDROCHLORIDE 5 MG/ML
20 INJECTION, SOLUTION INTRAVENOUS ONCE
Status: COMPLETED | OUTPATIENT
Start: 2019-03-24 | End: 2019-03-24

## 2019-03-24 RX ORDER — CLONIDINE HYDROCHLORIDE 0.1 MG/1
0.1 TABLET ORAL ONCE
Status: COMPLETED | OUTPATIENT
Start: 2019-03-24 | End: 2019-03-24

## 2019-03-24 RX ORDER — ACETAMINOPHEN 325 MG/1
650 TABLET ORAL ONCE
Status: COMPLETED | OUTPATIENT
Start: 2019-03-24 | End: 2019-03-24

## 2019-03-24 RX ORDER — LORAZEPAM 2 MG/ML
1 INJECTION INTRAMUSCULAR ONCE
Status: COMPLETED | OUTPATIENT
Start: 2019-03-24 | End: 2019-03-24

## 2019-03-24 RX ADMIN — ACETAMINOPHEN 650 MG: 325 TABLET ORAL at 20:41

## 2019-03-24 RX ADMIN — LORAZEPAM 1 MG: 2 INJECTION INTRAMUSCULAR; INTRAVENOUS at 21:40

## 2019-03-24 RX ADMIN — LABETALOL 20 MG/4 ML (5 MG/ML) INTRAVENOUS SYRINGE 20 MG: at 23:44

## 2019-03-24 RX ADMIN — CLONIDINE HYDROCHLORIDE 0.1 MG: 0.1 TABLET ORAL at 21:39

## 2019-03-24 ASSESSMENT — PAIN SCALES - GENERAL: PAINLEVEL_OUTOF10: 9

## 2019-03-24 NOTE — ED NOTES
Bed: 05  Expected date:   Expected time:   Means of arrival:   Comments:  EMS     Shaheen Augustine RN  03/24/19 [id number]

## 2019-03-25 ENCOUNTER — HOSPITAL ENCOUNTER (EMERGENCY)
Facility: HOSPITAL | Age: 49
Discharge: HOME | End: 2019-03-26
Attending: EMERGENCY MEDICINE | Admitting: EMERGENCY MEDICINE

## 2019-03-25 DIAGNOSIS — R51.9 NONINTRACTABLE HEADACHE, UNSPECIFIED CHRONICITY PATTERN, UNSPECIFIED HEADACHE TYPE: Primary | ICD-10-CM

## 2019-03-25 PROCEDURE — 99284 EMERGENCY DEPT VISIT MOD MDM: CPT

## 2019-03-25 PROCEDURE — 99284 EMERGENCY DEPT VISIT MOD MDM: CPT | Performed by: EMERGENCY MEDICINE

## 2019-03-25 RX ORDER — METOCLOPRAMIDE HYDROCHLORIDE 5 MG/ML
10 INJECTION INTRAMUSCULAR; INTRAVENOUS ONCE
Status: COMPLETED | OUTPATIENT
Start: 2019-03-25 | End: 2019-03-26

## 2019-03-25 RX ORDER — DIPHENHYDRAMINE HYDROCHLORIDE 50 MG/ML
25 INJECTION INTRAMUSCULAR; INTRAVENOUS ONCE
Status: COMPLETED | OUTPATIENT
Start: 2019-03-25 | End: 2019-03-26

## 2019-03-25 ASSESSMENT — ENCOUNTER SYMPTOMS
COUGH: 1
NAUSEA: 0
VOMITING: 0
SHORTNESS OF BREATH: 1
ABDOMINAL PAIN: 0

## 2019-03-25 NOTE — ED PROVIDER NOTES
dialysis on tues, thur, and sat at [facility]    Hypertension     Hypothyroid     Nasal congestion     recent    Restless leg syndrome          SURGICAL HISTORY       Past Surgical History:   Procedure Laterality Date    BREAST SURGERY      infected milk gland removed    CHOLECYSTECTOMY      COLONOSCOPY      DIALYSIS FISTULA CREATION Left 1/25/2019    REVISION LEFT UPPER EXTREMITY AV ACCESS WITH LEFT BRACHIAL ARTERY TO LEFT AXILLARY VEIN WITH  INTERPOSITIONAL ARTEGRAFT   performed by Celeste Parks MD at [address]  2012    GALLBLADDER SURGERY      HC DIALYSIS CATHETER Right 1/25/2019    INSERTION OF RIGHT INTERNAL JUGULAR HEMODIALYSIS CATHETER performed by Celeste Parks MD at [facility]      pt denies hyst; states ablation    [place] COLONOSCOPY W/BIOPSY SINGLE/MULTIPLE N/A 10/20/2017    Dr Cynthia Oliveira prep-Tubular AP (-) dysplasia x 2--3 yr recall    [place] EGD TRANSORAL BIOPSY SINGLE/MULTIPLE N/A 12/27/2016    Dr Magdi Asencio EGD TRANSORAL BIOPSY SINGLE/MULTIPLE N/A 10/20/2017    Dr Ritchie Barrera (-)    TUBAL LIGATION      VASCULAR SURGERY Left 2016    fistula by Dr Zakiya Patel at [address]  10/02/2017    SJS. Left upper fistulograms/venograms, balloon angioplasty cephalic vein arch 62B23 conquest.    VASCULAR SURGERY  08/2018    FISTULOGRAM    VASCULAR SURGERY  10/29/2018    SJS. Left upper fistulograms/venograms    VASCULAR SURGERY  12/19/2018    SJS. Arch aortogram,left upper arteriograms.  VASCULAR SURGERY  03/06/2019    SJS. Removal of tunneled dialyis catheter right internal jugular vein.          CURRENT MEDICATIONS     Previous Medications    ALBUTEROL SULFATE  (90 BASE) MCG/ACT INHALER    Inhale 2 puffs into the lungs every 4 hours as needed    CLONIDINE (CATAPRES) 0.1 MG TABLET    Take 0.1 mg by mouth 2 times daily as needed     DULOXETINE (CYMBALTA) 60 MG EXTENDED RELEASE CAPSULE    Take 60 mg by mouth daily    INSULIN ASPART quittin.8    Smokeless tobacco: Never Used   Substance and Sexual Activity    Alcohol use: No    Drug use: No    Sexual activity: Not Currently     Partners: Male   Lifestyle    Physical activity:     Days per week: None     Minutes per session: None    Stress: None   Relationships    Social connections:     Talks on phone: None     Gets together: None     Attends Nondenominational service: None     Active member of club or organization: None     Attends meetings of clubs or organizations: None     Relationship status: None    Intimate partner violence:     Fear of current or ex partner: None     Emotionally abused: None     Physically abused: None     Forced sexual activity: None   Other Topics Concern    None   Social History Narrative    None       SCREENINGS    [place] Coma Scale  Eye Opening: Spontaneous  Best Verbal Response: Oriented  Best Motor Response: Obeys commands  [place] Coma Scale Score: 15        PHYSICAL EXAM    (up to 7 for level 4, 8 or more for level 5)     ED Triage Vitals   BP Temp Temp Source Pulse Resp SpO2 Height Weight   19 1733 19 1733 19 1733 19 1733 19 2041 19 1733 -- --   139/65 97.6 °F (36.4 °C) Oral 66 17 (!) 88 %         Physical Exam   Constitutional: She is oriented to person, place, and time. She is cooperative. HENT:   Head: Atraumatic. Mouth/Throat: Oropharynx is clear and moist. Mucous membranes are not dry. No posterior oropharyngeal erythema. Eyes: Pupils are equal, round, and reactive to light. No scleral icterus. Neck: No tracheal deviation present. Cardiovascular: Normal rate, regular rhythm, normal heart sounds and intact distal pulses. No murmur heard. Pulmonary/Chest: Effort normal and breath sounds normal. No stridor. No respiratory distress. Abdominal: Soft. She exhibits no distension. There is no tenderness. There is no guarding. Neurological: She is alert and oriented to person, place, and time.  She has normal strength. Skin: Capillary refill takes less than 2 seconds. No rash noted. No pallor. Nursing note and vitals reviewed. DIAGNOSTIC RESULTS     EKG: All EKG's areinterpreted by the Emergency Department Physician who either signs or Co-signs this chart in the absence of a cardiologist.    2040: NSR @ 68, no St elevation noted    RADIOLOGY:  Non-plain film images such as CT, Ultrasound and MRI are read by the radiologist. Plain radiographic images are visualized and preliminarily interpreted bythe emergency physician with the below findings:        XR CHEST PORTABLE   Final Result   Suspect tiny left pleural effusion. No overt edema/overload. Signed by Dr Chiqui Jasso on 3/24/2019 7:51 PM              LABS:  Labs Reviewed   CBC WITH AUTO DIFFERENTIAL - Abnormal; Notable for the following components:       Result Value    MCHC 32.6 (*)     Basophils % 1.5 (*)     All other components within normal limits   COMPREHENSIVE METABOLIC PANEL - Abnormal; Notable for the following components:    Sodium 130 (*)     Chloride 86 (*)     Anion Gap 21 (*)     Glucose 274 (*)     BUN 42 (*)     CREATININE 4.9 (*)     GFR Non-African American 9 (*)     Calcium 10.2 (*)     Alkaline Phosphatase 171 (*)     ALT 40 (*)     AST 41 (*)     All other components within normal limits   BRAIN NATRIURETIC PEPTIDE - Abnormal; Notable for the following components:    Pro-BNP 35,000 (*)     All other components within normal limits   D-DIMER, QUANTITATIVE - Abnormal; Notable for the following components:    D-Dimer, Quant 0.72 (*)     All other components within normal limits   TROPONIN   TROPONIN       All other labs were within normal range or not returned as of this dictation.     EMERGENCY DEPARTMENT COURSE and DIFFERENTIAL DIAGNOSIS/MDM:   Vitals:    Vitals:    03/24/19 2135 03/24/19 2215 03/24/19 2248 03/24/19 2347   BP: (!) 195/89 (!) 224/100 (!) 220/90 (!) 186/88   Pulse: 67 69 67 71   Resp: 15  20 20   Temp:   99.2 °F (37.3

## 2019-03-26 ENCOUNTER — APPOINTMENT (OUTPATIENT)
Dept: GENERAL RADIOLOGY | Facility: HOSPITAL | Age: 49
End: 2019-03-26

## 2019-03-26 ENCOUNTER — APPOINTMENT (OUTPATIENT)
Dept: CT IMAGING | Facility: HOSPITAL | Age: 49
End: 2019-03-26

## 2019-03-26 VITALS
TEMPERATURE: 99.4 F | HEART RATE: 71 BPM | SYSTOLIC BLOOD PRESSURE: 168 MMHG | RESPIRATION RATE: 16 BRPM | HEIGHT: 66 IN | WEIGHT: 150 LBS | DIASTOLIC BLOOD PRESSURE: 78 MMHG | OXYGEN SATURATION: 100 % | BODY MASS INDEX: 24.11 KG/M2

## 2019-03-26 LAB
ANION GAP SERPL CALCULATED.3IONS-SCNC: 21 MMOL/L (ref 4–13)
APTT PPP: 27.1 SECONDS (ref 24.1–34.8)
ATMOSPHERIC PRESS: 758 MMHG
BASE EXCESS BLDV CALC-SCNC: 1.2 MMOL/L (ref 0–2)
BASOPHILS # BLD AUTO: 0.1 10*3/MM3 (ref 0–0.2)
BASOPHILS NFR BLD AUTO: 1.9 % (ref 0–2)
BDY SITE: NORMAL
BODY TEMPERATURE: 37 C
BUN BLD-MCNC: 60 MG/DL (ref 5–21)
BUN/CREAT SERPL: 7.8 (ref 7–25)
CALCIUM SPEC-SCNC: 9 MG/DL (ref 8.4–10.4)
CHLORIDE SERPL-SCNC: 89 MMOL/L (ref 98–110)
CO2 SERPL-SCNC: 23 MMOL/L (ref 24–31)
CREAT BLD-MCNC: 7.65 MG/DL (ref 0.5–1.4)
DEPRECATED RDW RBC AUTO: 45.2 FL (ref 40–54)
EKG P AXIS: 72 DEGREES
EKG P AXIS: 83 DEGREES
EKG P-R INTERVAL: 160 MS
EKG P-R INTERVAL: 170 MS
EKG Q-T INTERVAL: 448 MS
EKG Q-T INTERVAL: 482 MS
EKG QRS DURATION: 92 MS
EKG QRS DURATION: 98 MS
EKG QTC CALCULATION (BAZETT): 460 MS
EKG QTC CALCULATION (BAZETT): 489 MS
EKG T AXIS: 82 DEGREES
EKG T AXIS: 94 DEGREES
EOSINOPHIL # BLD AUTO: 0.11 10*3/MM3 (ref 0–0.7)
EOSINOPHIL NFR BLD AUTO: 2.1 % (ref 0–4)
ERYTHROCYTE [DISTWIDTH] IN BLOOD BY AUTOMATED COUNT: 13.6 % (ref 12–15)
FLUAV AG NPH QL: NEGATIVE
FLUBV AG NPH QL IA: NEGATIVE
GFR SERPL CREATININE-BSD FRML MDRD: 6 ML/MIN/1.73
GFR SERPL CREATININE-BSD FRML MDRD: ABNORMAL ML/MIN/1.73
GLUCOSE BLD-MCNC: 245 MG/DL (ref 70–100)
HCO3 BLDV-SCNC: 26.7 MMOL/L (ref 22–28)
HCT VFR BLD AUTO: 32.6 % (ref 37–47)
HGB BLD-MCNC: 11 G/DL (ref 12–16)
IMM GRANULOCYTES # BLD AUTO: 0.01 10*3/MM3 (ref 0–0.05)
IMM GRANULOCYTES NFR BLD AUTO: 0.2 % (ref 0–5)
INR PPP: 0.93 (ref 0.91–1.09)
LYMPHOCYTES # BLD AUTO: 1.21 10*3/MM3 (ref 0.72–4.86)
LYMPHOCYTES NFR BLD AUTO: 22.7 % (ref 15–45)
Lab: NORMAL
MCH RBC QN AUTO: 30.3 PG (ref 28–32)
MCHC RBC AUTO-ENTMCNC: 33.7 G/DL (ref 33–36)
MCV RBC AUTO: 89.8 FL (ref 82–98)
MODALITY: NORMAL
MONOCYTES # BLD AUTO: 0.43 10*3/MM3 (ref 0.19–1.3)
MONOCYTES NFR BLD AUTO: 8.1 % (ref 4–12)
NEUTROPHILS # BLD AUTO: 3.47 10*3/MM3 (ref 1.87–8.4)
NEUTROPHILS NFR BLD AUTO: 65 % (ref 39–78)
NRBC BLD AUTO-RTO: 0 /100 WBC (ref 0–0)
PCO2 BLDV: 45 MM HG (ref 41–51)
PH BLDV: 7.38 PH UNITS (ref 7.32–7.42)
PLATELET # BLD AUTO: 134 10*3/MM3 (ref 130–400)
PMV BLD AUTO: 10.3 FL (ref 6–12)
PO2 BLDV: 43.1 MM HG (ref 27–53)
POTASSIUM BLD-SCNC: 4.3 MMOL/L (ref 3.5–5.3)
PROTHROMBIN TIME: 12.7 SECONDS (ref 11.9–14.6)
RBC # BLD AUTO: 3.63 10*6/MM3 (ref 4.2–5.4)
SAO2 % BLDCOV: 75 % (ref 45–75)
SODIUM BLD-SCNC: 133 MMOL/L (ref 135–145)
VENTILATOR MODE: NORMAL
WBC NRBC COR # BLD: 5.33 10*3/MM3 (ref 4.8–10.8)

## 2019-03-26 PROCEDURE — 96374 THER/PROPH/DIAG INJ IV PUSH: CPT

## 2019-03-26 PROCEDURE — 85730 THROMBOPLASTIN TIME PARTIAL: CPT | Performed by: EMERGENCY MEDICINE

## 2019-03-26 PROCEDURE — 25010000002 METOCLOPRAMIDE PER 10 MG: Performed by: EMERGENCY MEDICINE

## 2019-03-26 PROCEDURE — 80048 BASIC METABOLIC PNL TOTAL CA: CPT | Performed by: EMERGENCY MEDICINE

## 2019-03-26 PROCEDURE — 82803 BLOOD GASES ANY COMBINATION: CPT

## 2019-03-26 PROCEDURE — 85610 PROTHROMBIN TIME: CPT | Performed by: EMERGENCY MEDICINE

## 2019-03-26 PROCEDURE — 70450 CT HEAD/BRAIN W/O DYE: CPT

## 2019-03-26 PROCEDURE — 71045 X-RAY EXAM CHEST 1 VIEW: CPT

## 2019-03-26 PROCEDURE — 87804 INFLUENZA ASSAY W/OPTIC: CPT | Performed by: EMERGENCY MEDICINE

## 2019-03-26 PROCEDURE — 85025 COMPLETE CBC W/AUTO DIFF WBC: CPT | Performed by: EMERGENCY MEDICINE

## 2019-03-26 PROCEDURE — 96375 TX/PRO/DX INJ NEW DRUG ADDON: CPT

## 2019-03-26 PROCEDURE — 25010000002 DIPHENHYDRAMINE PER 50 MG: Performed by: EMERGENCY MEDICINE

## 2019-03-26 RX ORDER — ACETAMINOPHEN 500 MG
1000 TABLET ORAL ONCE
Status: COMPLETED | OUTPATIENT
Start: 2019-03-26 | End: 2019-03-26

## 2019-03-26 RX ADMIN — DIPHENHYDRAMINE HYDROCHLORIDE 25 MG: 50 INJECTION INTRAMUSCULAR; INTRAVENOUS at 00:20

## 2019-03-26 RX ADMIN — METOCLOPRAMIDE 10 MG: 5 INJECTION, SOLUTION INTRAMUSCULAR; INTRAVENOUS at 00:18

## 2019-03-26 RX ADMIN — ACETAMINOPHEN 1000 MG: 500 TABLET ORAL at 04:01

## 2019-03-27 ENCOUNTER — APPOINTMENT (OUTPATIENT)
Dept: CT IMAGING | Age: 49
DRG: 640 | End: 2019-03-27
Payer: MEDICARE

## 2019-03-27 ENCOUNTER — APPOINTMENT (OUTPATIENT)
Dept: GENERAL RADIOLOGY | Age: 49
DRG: 640 | End: 2019-03-27
Payer: MEDICARE

## 2019-03-27 ENCOUNTER — HOSPITAL ENCOUNTER (INPATIENT)
Age: 49
LOS: 6 days | Discharge: HOME | DRG: 640 | End: 2019-04-02
Attending: EMERGENCY MEDICINE | Admitting: INTERNAL MEDICINE
Payer: MEDICARE

## 2019-03-27 DIAGNOSIS — R73.9 HYPERGLYCEMIA: ICD-10-CM

## 2019-03-27 DIAGNOSIS — I16.1 HYPERTENSIVE EMERGENCY: ICD-10-CM

## 2019-03-27 DIAGNOSIS — N18.9 CHRONIC RENAL FAILURE, UNSPECIFIED CKD STAGE: ICD-10-CM

## 2019-03-27 DIAGNOSIS — E87.1 HYPONATREMIA: ICD-10-CM

## 2019-03-27 DIAGNOSIS — J81.0 ACUTE PULMONARY EDEMA (HCC): Primary | ICD-10-CM

## 2019-03-27 PROBLEM — E87.70 VOLUME OVERLOAD: Status: ACTIVE | Noted: 2019-03-27

## 2019-03-27 LAB
ALBUMIN SERPL-MCNC: 4.2 G/DL (ref 3.5–5.2)
ALP BLD-CCNC: 172 U/L (ref 35–104)
ALT SERPL-CCNC: 67 U/L (ref 5–33)
ANION GAP SERPL CALCULATED.3IONS-SCNC: 13 MMOL/L (ref 7–19)
ANION GAP SERPL CALCULATED.3IONS-SCNC: 14 MMOL/L (ref 7–19)
AST SERPL-CCNC: 91 U/L (ref 5–32)
BILIRUB SERPL-MCNC: 0.6 MG/DL (ref 0.2–1.2)
BUN BLDV-MCNC: 21 MG/DL (ref 6–20)
BUN BLDV-MCNC: 31 MG/DL (ref 6–20)
CALCIUM SERPL-MCNC: 8.6 MG/DL (ref 8.6–10)
CALCIUM SERPL-MCNC: 8.9 MG/DL (ref 8.6–10)
CHLORIDE BLD-SCNC: 84 MMOL/L (ref 98–111)
CHLORIDE BLD-SCNC: 91 MMOL/L (ref 98–111)
CO2: 28 MMOL/L (ref 22–29)
CO2: 29 MMOL/L (ref 22–29)
CREAT SERPL-MCNC: 3.1 MG/DL (ref 0.5–0.9)
CREAT SERPL-MCNC: 4.4 MG/DL (ref 0.5–0.9)
D DIMER: 1 UG/ML FEU (ref 0–0.48)
GFR NON-AFRICAN AMERICAN: 11
GFR NON-AFRICAN AMERICAN: 16
GLUCOSE BLD-MCNC: 179 MG/DL (ref 70–99)
GLUCOSE BLD-MCNC: 225 MG/DL (ref 74–109)
GLUCOSE BLD-MCNC: 337 MG/DL (ref 74–109)
GLUCOSE BLD-MCNC: 370 MG/DL (ref 70–99)
HCT VFR BLD CALC: 32.6 % (ref 37–47)
HEMOGLOBIN: 10.7 G/DL (ref 12–16)
LIPASE: 65 U/L (ref 13–60)
LV EF: 57 %
LVEF MODALITY: NORMAL
MCH RBC QN AUTO: 30.4 PG (ref 27–31)
MCHC RBC AUTO-ENTMCNC: 32.8 G/DL (ref 33–37)
MCV RBC AUTO: 92.6 FL (ref 81–99)
PDW BLD-RTO: 13.5 % (ref 11.5–14.5)
PERFORMED ON: ABNORMAL
PERFORMED ON: ABNORMAL
PLATELET # BLD: 114 K/UL (ref 130–400)
PMV BLD AUTO: 10.7 FL (ref 9.4–12.3)
POTASSIUM SERPL-SCNC: 3.1 MMOL/L (ref 3.5–5)
POTASSIUM SERPL-SCNC: 3.6 MMOL/L (ref 3.5–5)
PRO-BNP: ABNORMAL PG/ML (ref 0–900)
RAPID INFLUENZA  B AGN: NEGATIVE
RAPID INFLUENZA A AGN: NEGATIVE
RBC # BLD: 3.52 M/UL (ref 4.2–5.4)
SODIUM BLD-SCNC: 118 MMOL/L (ref 136–145)
SODIUM BLD-SCNC: 127 MMOL/L (ref 136–145)
SODIUM BLD-SCNC: 132 MMOL/L (ref 136–145)
TOTAL PROTEIN: 7.5 G/DL (ref 6.6–8.7)
TROPONIN: 0.02 NG/ML (ref 0–0.03)
TROPONIN: 0.02 NG/ML (ref 0–0.03)
TROPONIN: 0.03 NG/ML (ref 0–0.03)
WBC # BLD: 4.7 K/UL (ref 4.8–10.8)

## 2019-03-27 PROCEDURE — 8010000000 HC HEMODIALYSIS ACUTE INPT

## 2019-03-27 PROCEDURE — 99291 CRITICAL CARE FIRST HOUR: CPT | Performed by: EMERGENCY MEDICINE

## 2019-03-27 PROCEDURE — 96374 THER/PROPH/DIAG INJ IV PUSH: CPT

## 2019-03-27 PROCEDURE — 6360000004 HC RX CONTRAST MEDICATION: Performed by: EMERGENCY MEDICINE

## 2019-03-27 PROCEDURE — 6370000000 HC RX 637 (ALT 250 FOR IP): Performed by: INTERNAL MEDICINE

## 2019-03-27 PROCEDURE — 2500000003 HC RX 250 WO HCPCS: Performed by: EMERGENCY MEDICINE

## 2019-03-27 PROCEDURE — 84484 ASSAY OF TROPONIN QUANT: CPT

## 2019-03-27 PROCEDURE — 2580000003 HC RX 258: Performed by: EMERGENCY MEDICINE

## 2019-03-27 PROCEDURE — 85379 FIBRIN DEGRADATION QUANT: CPT

## 2019-03-27 PROCEDURE — 99223 1ST HOSP IP/OBS HIGH 75: CPT | Performed by: INTERNAL MEDICINE

## 2019-03-27 PROCEDURE — 80053 COMPREHEN METABOLIC PANEL: CPT

## 2019-03-27 PROCEDURE — 84295 ASSAY OF SERUM SODIUM: CPT

## 2019-03-27 PROCEDURE — 71275 CT ANGIOGRAPHY CHEST: CPT

## 2019-03-27 PROCEDURE — 2580000003 HC RX 258: Performed by: INTERNAL MEDICINE

## 2019-03-27 PROCEDURE — 5A1D70Z PERFORMANCE OF URINARY FILTRATION, INTERMITTENT, LESS THAN 6 HOURS PER DAY: ICD-10-PCS | Performed by: INTERNAL MEDICINE

## 2019-03-27 PROCEDURE — 36415 COLL VENOUS BLD VENIPUNCTURE: CPT

## 2019-03-27 PROCEDURE — 93306 TTE W/DOPPLER COMPLETE: CPT

## 2019-03-27 PROCEDURE — 2700000000 HC OXYGEN THERAPY PER DAY

## 2019-03-27 PROCEDURE — 82948 REAGENT STRIP/BLOOD GLUCOSE: CPT

## 2019-03-27 PROCEDURE — 83880 ASSAY OF NATRIURETIC PEPTIDE: CPT

## 2019-03-27 PROCEDURE — 85027 COMPLETE CBC AUTOMATED: CPT

## 2019-03-27 PROCEDURE — 6360000002 HC RX W HCPCS: Performed by: EMERGENCY MEDICINE

## 2019-03-27 PROCEDURE — 93005 ELECTROCARDIOGRAM TRACING: CPT

## 2019-03-27 PROCEDURE — 71046 X-RAY EXAM CHEST 2 VIEWS: CPT

## 2019-03-27 PROCEDURE — 87804 INFLUENZA ASSAY W/OPTIC: CPT

## 2019-03-27 PROCEDURE — 2000000000 HC ICU R&B

## 2019-03-27 PROCEDURE — 83690 ASSAY OF LIPASE: CPT

## 2019-03-27 PROCEDURE — 99285 EMERGENCY DEPT VISIT HI MDM: CPT

## 2019-03-27 RX ORDER — ROPINIROLE 2 MG/1
2 TABLET, FILM COATED ORAL NIGHTLY
Status: DISCONTINUED | OUTPATIENT
Start: 2019-03-27 | End: 2019-04-02 | Stop reason: HOSPADM

## 2019-03-27 RX ORDER — ACETAMINOPHEN 325 MG/1
650 TABLET ORAL EVERY 6 HOURS PRN
Status: DISCONTINUED | OUTPATIENT
Start: 2019-03-27 | End: 2019-04-02 | Stop reason: HOSPADM

## 2019-03-27 RX ORDER — SIMVASTATIN 40 MG
40 TABLET ORAL NIGHTLY
Status: DISCONTINUED | OUTPATIENT
Start: 2019-03-27 | End: 2019-04-02 | Stop reason: HOSPADM

## 2019-03-27 RX ORDER — NIFEDIPINE 60 MG/1
60 TABLET, EXTENDED RELEASE ORAL DAILY
Status: DISCONTINUED | OUTPATIENT
Start: 2019-03-27 | End: 2019-03-27

## 2019-03-27 RX ORDER — ONDANSETRON 2 MG/ML
4 INJECTION INTRAMUSCULAR; INTRAVENOUS EVERY 6 HOURS PRN
Status: DISCONTINUED | OUTPATIENT
Start: 2019-03-27 | End: 2019-04-02 | Stop reason: HOSPADM

## 2019-03-27 RX ORDER — LEVOTHYROXINE SODIUM 0.07 MG/1
150 TABLET ORAL DAILY
Status: DISCONTINUED | OUTPATIENT
Start: 2019-03-28 | End: 2019-04-02 | Stop reason: HOSPADM

## 2019-03-27 RX ORDER — SODIUM CHLORIDE 0.9 % (FLUSH) 0.9 %
10 SYRINGE (ML) INJECTION PRN
Status: DISCONTINUED | OUTPATIENT
Start: 2019-03-27 | End: 2019-04-02 | Stop reason: HOSPADM

## 2019-03-27 RX ORDER — INSULIN GLARGINE 100 [IU]/ML
10 INJECTION, SOLUTION SUBCUTANEOUS NIGHTLY
Status: DISCONTINUED | OUTPATIENT
Start: 2019-03-27 | End: 2019-04-02 | Stop reason: HOSPADM

## 2019-03-27 RX ORDER — METOPROLOL TARTRATE 50 MG/1
100 TABLET, FILM COATED ORAL 2 TIMES DAILY
Status: DISCONTINUED | OUTPATIENT
Start: 2019-03-27 | End: 2019-04-02 | Stop reason: HOSPADM

## 2019-03-27 RX ORDER — CLONIDINE HYDROCHLORIDE 0.1 MG/1
0.1 TABLET ORAL 2 TIMES DAILY
Status: DISCONTINUED | OUTPATIENT
Start: 2019-03-27 | End: 2019-03-28

## 2019-03-27 RX ORDER — NICOTINE POLACRILEX 4 MG
15 LOZENGE BUCCAL PRN
Status: DISCONTINUED | OUTPATIENT
Start: 2019-03-27 | End: 2019-04-02 | Stop reason: HOSPADM

## 2019-03-27 RX ORDER — LOSARTAN POTASSIUM 25 MG/1
25 TABLET ORAL DAILY
Status: DISCONTINUED | OUTPATIENT
Start: 2019-03-27 | End: 2019-03-27

## 2019-03-27 RX ORDER — DEXTROSE MONOHYDRATE 25 G/50ML
12.5 INJECTION, SOLUTION INTRAVENOUS PRN
Status: DISCONTINUED | OUTPATIENT
Start: 2019-03-27 | End: 2019-04-02 | Stop reason: HOSPADM

## 2019-03-27 RX ORDER — LISINOPRIL 20 MG/1
20 TABLET ORAL DAILY
Status: DISCONTINUED | OUTPATIENT
Start: 2019-03-27 | End: 2019-04-02

## 2019-03-27 RX ORDER — SODIUM CHLORIDE 0.9 % (FLUSH) 0.9 %
10 SYRINGE (ML) INJECTION EVERY 12 HOURS SCHEDULED
Status: DISCONTINUED | OUTPATIENT
Start: 2019-03-27 | End: 2019-04-02 | Stop reason: HOSPADM

## 2019-03-27 RX ORDER — DEXTROSE MONOHYDRATE 50 MG/ML
100 INJECTION, SOLUTION INTRAVENOUS PRN
Status: DISCONTINUED | OUTPATIENT
Start: 2019-03-27 | End: 2019-04-02 | Stop reason: HOSPADM

## 2019-03-27 RX ORDER — ISOSORBIDE MONONITRATE 60 MG/1
120 TABLET, EXTENDED RELEASE ORAL DAILY
Status: DISCONTINUED | OUTPATIENT
Start: 2019-03-27 | End: 2019-04-02 | Stop reason: HOSPADM

## 2019-03-27 RX ORDER — ONDANSETRON 2 MG/ML
4 INJECTION INTRAMUSCULAR; INTRAVENOUS ONCE
Status: COMPLETED | OUTPATIENT
Start: 2019-03-27 | End: 2019-03-27

## 2019-03-27 RX ADMIN — INSULIN LISPRO 1 UNITS: 100 INJECTION, SOLUTION INTRAVENOUS; SUBCUTANEOUS at 20:40

## 2019-03-27 RX ADMIN — ACETAMINOPHEN 650 MG: 325 TABLET, FILM COATED ORAL at 21:30

## 2019-03-27 RX ADMIN — INSULIN LISPRO 10 UNITS: 100 INJECTION, SOLUTION INTRAVENOUS; SUBCUTANEOUS at 16:48

## 2019-03-27 RX ADMIN — METOPROLOL TARTRATE 100 MG: 50 TABLET, FILM COATED ORAL at 20:40

## 2019-03-27 RX ADMIN — LISINOPRIL 20 MG: 20 TABLET ORAL at 18:30

## 2019-03-27 RX ADMIN — DEXTROSE MONOHYDRATE 5 MG/HR: 50 INJECTION, SOLUTION INTRAVENOUS at 11:44

## 2019-03-27 RX ADMIN — SIMVASTATIN 40 MG: 40 TABLET, FILM COATED ORAL at 20:39

## 2019-03-27 RX ADMIN — ONDANSETRON 4 MG: 2 INJECTION INTRAMUSCULAR; INTRAVENOUS at 12:16

## 2019-03-27 RX ADMIN — INSULIN GLARGINE 10 UNITS: 100 INJECTION, SOLUTION SUBCUTANEOUS at 20:40

## 2019-03-27 RX ADMIN — CLONIDINE HYDROCHLORIDE 0.1 MG: 0.1 TABLET ORAL at 20:40

## 2019-03-27 RX ADMIN — ISOSORBIDE MONONITRATE 120 MG: 60 TABLET, EXTENDED RELEASE ORAL at 15:57

## 2019-03-27 RX ADMIN — DEXTROSE MONOHYDRATE 5 MG/HR: 50 INJECTION, SOLUTION INTRAVENOUS at 17:02

## 2019-03-27 RX ADMIN — DEXTROSE MONOHYDRATE 2.5 MG/HR: 50 INJECTION, SOLUTION INTRAVENOUS at 22:45

## 2019-03-27 RX ADMIN — Medication 10 ML: at 20:40

## 2019-03-27 RX ADMIN — IOPAMIDOL 90 ML: 755 INJECTION, SOLUTION INTRAVENOUS at 12:25

## 2019-03-27 RX ADMIN — ROPINIROLE HYDROCHLORIDE 2 MG: 2 TABLET, FILM COATED ORAL at 20:40

## 2019-03-27 RX ADMIN — ACETAMINOPHEN 650 MG: 325 TABLET, FILM COATED ORAL at 15:57

## 2019-03-27 RX ADMIN — LOSARTAN POTASSIUM 25 MG: 25 TABLET ORAL at 15:57

## 2019-03-27 RX ADMIN — DEXTROSE MONOHYDRATE 2.5 MG/HR: 50 INJECTION, SOLUTION INTRAVENOUS at 22:46

## 2019-03-27 ASSESSMENT — PAIN DESCRIPTION - LOCATION: LOCATION: HEAD

## 2019-03-27 ASSESSMENT — PAIN SCALES - GENERAL
PAINLEVEL_OUTOF10: 8
PAINLEVEL_OUTOF10: 8
PAINLEVEL_OUTOF10: 0
PAINLEVEL_OUTOF10: 5

## 2019-03-27 ASSESSMENT — ENCOUNTER SYMPTOMS
DIARRHEA: 0
VOMITING: 1
SHORTNESS OF BREATH: 1
EYE PAIN: 0
ABDOMINAL PAIN: 0

## 2019-03-27 ASSESSMENT — PAIN DESCRIPTION - PAIN TYPE: TYPE: ACUTE PAIN

## 2019-03-27 ASSESSMENT — PAIN DESCRIPTION - PROGRESSION: CLINICAL_PROGRESSION: GRADUALLY IMPROVING

## 2019-03-27 NOTE — CONSULTS
Inpatient consult to Nephrology  Consult performed by: Glenny Bush MD  Consult ordered by: Rosy White MD  Assessment/Recommendations: Nephrology [facility]) Consult Note      Patient:  Eileen Morales  YOB: 1970  Date of Service: 3/27/2019  MRN: [id number]   Acct: [de-identified]   Primary Care Physician: ROMINA Edwards  Advance Directive: Full Code  Admit Date: 3/27/2019       Hospital Day: 0  Referring Provider: Racheal Su MD    Patient independently seen and examined, Chart, Consults, Notes, Operative notes, Labs, Cardiology, and Radiology studies reviewed as able. Chief complaint: Shortness of breath. Subjective:  Eileen Morales is a 47 y.o. female  whom we were consulted for end-stage renal disease secondary to diabetic nephropathy. She goes to [facility] clinic on Tuesday Thursday Saturday and has received full treatment yesterday but only removed 1000 mL of fluid. She presented with increasing shortness breath, dyspnea on exertion. She was also found to have severe systolic and diastolic hypertension, currently on Cardene and now admitted to ICU. She denies any excessive fluid intake. She has insulin-dependent type II diabetes and congestive heart failure. She is currently getting 2-D echocardiogram. Chest x-ray shows pulmonary edema. Allergies:  Penicillins;  Lamisil advanced (terbinafine); and Stadol (butorphanol)    Medicines:  Current Facility-Administered Medications:  niCARdipine (CARDENE) 25 mg in dextrose 5 % 250 mL infusion, 5 mg/hr, Intravenous, Continuous, Rosy White MD, Last Rate: 50 mL/hr at 03/27/19 1144, 5 mg/hr at 03/27/19 1144  sodium chloride flush 0.9 % injection 10 mL, 10 mL, Intravenous, 2 times per day, Racheal Su MD  sodium chloride flush 0.9 % injection 10 mL, 10 mL, Intravenous, PRN, Racheal Su MD  magnesium hydroxide (MILK OF MAGNESIA) 400 MG/5ML suspension 30 mL, 30 mL, Oral, Daily PRN, Bill Nobles MD  ondansetron [facility]) injection 4 mg, 4 mg, Intravenous, Q6H PRN, Bill Nobles MD  cloNIDine (CATAPRES) tablet 0.1 mg, 0.1 mg, Oral, BID, Bill Nobles MD   insulin glargine (LANTUS) injection vial 10 Units, 10 Units, Subcutaneous, Nightly, Bill Nobles MD  isosorbide mononitrate (IMDUR) extended release tablet 120 mg, 120 mg, Oral, Daily, Bill Nobles MD, 120 mg at 03/27/19 1557  (START ON 3/28/2019) levothyroxine (SYNTHROID) tablet 150 mcg, 150 mcg, Oral, Daily, Bill Nobles MD  losartan (COZAAR) tablet 25 mg, 25 mg, Oral, Daily, Bill Nobles MD, 25 mg at 03/27/19 1557  metoprolol tartrate (LOPRESSOR) tablet 100 mg, 100 mg, Oral, BID, Bill Nobles MD  rOPINIRole (REQUIP) tablet 2 mg, 2 mg, Oral, Nightly, Bill Nobles MD  simvastatin (ZOCOR) tablet 40 mg, 40 mg, Oral, Nightly, Bill Nobles MD  glucose (GLUTOSE) 40 % oral gel 15 g, 15 g, Oral, PRN, Bill Nobles MD   dextrose 50 % solution 12.5 g, 12.5 g, Intravenous, PRN, Bill Nobles MD  glucagon (rDNA) injection 1 mg, 1 mg, Intramuscular, PRN, Bill Nobles MD  dextrose 5 % solution, 100 mL/hr, Intravenous, PRN, Bill Nobles MD  insulin lispro (HUMALOG) injection vial 0-12 Units, 0-12 Units, Subcutaneous, TID WC, Bill Nobles MD, 10 Units at 03/27/19 1648  insulin lispro (HUMALOG) injection vial 0-6 Units, 0-6 Units, Subcutaneous, Nightly, Bill Nobles MD  acetaminophen (TYLENOL) tablet 650 mg, 650 mg, Oral, Q6H PRN, Bill Nobles MD, 650 mg at 03/27/19 1557        Past Medical History:  Past Medical History:  No date: Arthritis  No date: [facility] kidney disease, stage 5[facility]  06/1993: DM (diabetes mellitus) type II controlled with renal   manifestation (HCC)  No date: Gastroparesis  No date: GERD (gastroesophageal reflux disease)  No date: Hemodialysis patient [facility])      Comment:  dialysis on tues, thur, and [facility]  No date: Hypertension  No date: Hypothyroid  No date: Nasal congestion      Comment:  recent  No date: Restless leg syndrome    Past Surgical History:  Past Surgical History:  No date: BREAST SURGERY      Comment:  infected milk gland removed  No date: CHOLECYSTECTOMY  No date: COLONOSCOPY  1/25/2019: DIALYSIS FISTULA CREATION; Left      Comment:  REVISION LEFT UPPER EXTREMITY AV ACCESS WITH LEFT                BRACHIAL ARTERY TO LEFT AXILLARY VEIN WITH                 INTERPOSITIONAL ARTEGRAFT   performed by Landry Toledo MD at [address]  2012: ENDOMETRIAL ABLATION  No date: GALLBLADDER SURGERY  1/25/2019: [address]; Right      Comment:  INSERTION OF RIGHT INTERNAL JUGULAR HEMODIALYSIS                CATHETER performed by Landry Toledo MD at [address]  No date: HYSTERECTOMY      Comment:  pt denies hyst; states ablation  10/20/2017: [place] COLONOSCOPY W/BIOPSY SINGLE/MULTIPLE; N/A      Comment:  Dr Mackenzie Fritz prep-Tubular AP (-) dysplasia x 2--3 yr                recall  12/27/2016: [place] EGD TRANSORAL BIOPSY SINGLE/MULTIPLE; N/A      Comment:  Dr Navarro Martel  10/20/2017: [place] EGD TRANSORAL BIOPSY SINGLE/MULTIPLE; N/A      Comment:  Dr Danielle Beck (-)  No date: TUBAL LIGATION  2016: VASCULAR SURGERY; Left      Comment:  fistula by Dr Jonathan Muse at [facility]  10/02/2017: VASCULAR SURGERY      Comment:  JEN. Left upper fistulograms/venograms, balloon                angioplasty cephalic vein arch 46Q37 conquest.  08/2018: VASCULAR SURGERY      Comment:  FISTULOGRAM  10/29/2018: VASCULAR SURGERY      Comment:  SJS. Left upper fistulograms/venograms  12/19/2018: VASCULAR SURGERY      Comment:  JEN. Arch aortogram,left upper arteriograms. 03/06/2019: VASCULAR SURGERY      Comment:  SJS. Removal of tunneled dialyis catheter right internal                jugular vein.     Family History  Review of patient's family history indicates:  Problem: Colon Cancer      Relation: Mother          Age of Onset: (Not Specified)          Comment:  at 61  Problem: Colon Polyps      Relation: Mother          Age of Onset: (Not Specified)  Problem: Lung Cancer      Relation: Father          Age of Onset: (Not Specified)          Comment:  at 66  Problem: Colon Polyps      Relation: Father          Age of Onset: (Not Specified)  Problem: Stomach Cancer      Relation: Sister          Age of Onset: (Not Specified)  Problem: Breast Cancer      Relation: Sister          Age of Onset: (Not Specified)  Problem: Depression      Relation: Daughter          Age of Onset: 25          Comment: committed suicide  Problem: Liver Cancer      Relation: Neg Hx          Age of Onset: (Not Specified)  Problem: Liver Disease      Relation: Neg Hx          Age of Onset: (Not Specified)  Problem: Esophageal Cancer      Relation: Neg Hx          Age of Onset: (Not Specified)  Problem: Rectal Cancer      Relation: Neg Hx          Age of Onset: (Not Specified)      Social History  Social History    Socioeconomic History      Marital status:       Spouse name: Not on file      Number of children: 2      Years of education: Not on file      Highest education level: Not on file    Occupational History      Not on file    Social Needs      Financial resource strain: Not on file      Food insecurity:        Worry: Not on file        Inability: Not on file      Transportation needs:        Medical: Not on file        Non-medical: Not on file    Tobacco Use      Smoking status: Current Every Day Smoker        Packs/day: 0.50        Years: 25.00        Pack years: 12.5        Types: Cigarettes        Quit date: 2017        Years since quittin.8      Smokeless tobacco: Never Used    Substance and Sexual Activity      Alcohol use: No      Drug use: No      Sexual activity: Not Currently        Partners: Male    Lifestyle      Physical activity:        Days per week: Not on file        Minutes per session: Not on file      Stress: Not on Weight:150 lb (68 kg)  Intake/Output Summary (Last 24 hours) at 03/27/19 1658  Last data filed at 03/27/19 1500          Gross per 24 hour  Intake:           163.33 ml  Output:                   --  Net   :           163.33 ml  General: awake/alert   HEENT: Normocephalic atraumatic. Neck: Supple with no JVD or carotid bruits. Chest:  rales present- at the bases of both lung fields. CVS: regular rate and rhythm  Abdominal: soft, nontender, normal bowel sounds  Extremities: no cyanosis or edema  Skin: warm and dry without rash      Labs:  BMP:                 03/24/19 03/27/19                       1739          1032          NA           130*         127*          K            4.5          3.6           CL           86*          84*           CO2          23           29            BUN          42*          31*           CREATININE   4.9*         4.4*          CALCIUM      10.2*        8.9           CBC:                 03/24/19 03/27/19                       1739          1032          WBC          6.2          4.7*          HGB          12.7         10.7*         HCT          38.9         32.6*         MCV          91.5         92.6          PLT          170          114*          LIVER PROFILE:                 03/24/19 03/27/19                       1739          1032          AST          41*          91*           ALT          40*          67*           LIPASE        --          65*           BILITOT      0.6          0.6           ALKPHOS      171*         172*          PT/INR: No results for input(s): PROTIME, INR in the last 72 hours. APTT: No results for input(s): APTT in the last 72 hours. BNP:  No results for input(s): BNP in the last 72 hours. Ionized Calcium:No results for input(s): IONCA in the last 72 hours. Magnesium:No results for input(s): MG in the last 72 hours. Phosphorus:No results for input(s): PHOS in the last 72 hours.   HgbA1C: No results for input(s): LABA1C in the last 72 hours. Hepatic:                 03/24/19 03/27/19                       1739          1032          ALKPHOS      171*         172*          ALT          40*          67*           AST          41*          91*           PROT         8.0          7.5           BILITOT      0.6          0.6           LABALBU      4.4          4.2           Lactic Acid: No results for input(s): LACTA in the last 72 hours. Troponin: No results for input(s): CKTOTAL, CKMB, TROPONINT in the last 72 hours. ABGs: No results for input(s): PH, PCO2, PO2, HCO3, O2SAT in the last 72 hours. CRP:  No results for input(s): CRP in the last 72 hours. Sed Rate:  No results for input(s): SEDRATE in the last 72 hours. Cultures:   No results for input(s): CULTURE in the last 72 hours. No results for input(s): BC, José Magic in the last 72 hours. No results for input(s): CXSURG in the last 72 hours. Radiology reports as per the Radiologist  Radiology: Xr Chest Standard (2 Vw)    Result Date: 3/27/2019  History: 43-year-old with dyspnea. Reference: Chest radiograph March 24, 2019. Findings: Frontal and lateral chest radiographs performed. Heart mildly enlarged. From 3 days ago there is new interstitial edema predominantly at the bases. No significant pleural effusion. No pneumothorax. Left subclavian vascular stent. No acute osseous abnormalities. Development of interstitial pulmonary edema compared to 3 days prior. Signed by Dr Ghazala Romeo on 3/27/2019 12:08 PM    Cta Pulmonary W Contrast    Result Date: 3/27/2019  CTA PULMONARY W CONTRAST 3/27/2019 10:45 AM HISTORY: Dyspnea, clinical concerns for pulmonary embolus Comparison: 2/11/2019 chest CT Technique: CT evaluation of the chest was performed with intravenous contrast. 2 mm transaxial images were obtained. Maximum intensity projection reconstruction angiographic images were generated .  Two-dimensional Coronal and sagittal reconstruction images of the pulmonary arteries, aorta Pulmonary edema. 4. Insulin-dependent type II diabetes. 5. Severe systolic and diastolic hypertension  6. Anemia of chronic kidney disease. Plan:  1. Patient has received routine hemodialysis treatment yesterday  2. I will get short hemodialysis treatment to remove at least 2 L of fluid  3. Try to wean off Cardene. 4. She will get full hemodialysis treatment tomorrow. Thank you for the consult, we appreciate the opportunity to provide care to your patients. Feel free to contact me if I can be of any further assistance.       Bailey Hughes MD  03/27/19  4:58 PM

## 2019-03-27 NOTE — ED PROVIDER NOTES
Hemodialysis patient [facility])     dialysis on tues, thur, and sat at [facility] clinic    Hypertension     Hypothyroid     Nasal congestion     recent    Restless leg syndrome          SURGICAL HISTORY       Past Surgical History:   Procedure Laterality Date    BREAST SURGERY      infected milk gland removed    CHOLECYSTECTOMY      COLONOSCOPY      DIALYSIS FISTULA CREATION Left 1/25/2019    REVISION LEFT UPPER EXTREMITY AV ACCESS WITH LEFT BRACHIAL ARTERY TO LEFT AXILLARY VEIN WITH  INTERPOSITIONAL ARTEGRAFT   performed by Aleena Murray MD at [address]  2012    GALLBLADDER SURGERY      HC DIALYSIS CATHETER Right 1/25/2019    INSERTION OF RIGHT INTERNAL JUGULAR HEMODIALYSIS CATHETER performed by [facility]ena [facility] MD at [facility]      pt denies hyst; states ablation    [place] COLONOSCOPY W/BIOPSY SINGLE/MULTIPLE N/A 10/20/2017    Dr Michele Babb prep-Tubular AP (-) dysplasia x 2--3 yr recall    [place] EGD TRANSORAL BIOPSY SINGLE/MULTIPLE N/A 12/27/2016    Dr Andrae Bolton EGD TRANSORAL BIOPSY SINGLE/MULTIPLE N/A 10/20/2017    Dr Ana Adkins (-)    TUBAL LIGATION      VASCULAR SURGERY Left 2016    fistula by Dr Dena Hale at [address]  10/02/2017    SJS. Left upper fistulograms/venograms, balloon angioplasty cephalic vein arch 83M36 conquest.    VASCULAR SURGERY  08/2018    FISTULOGRAM    VASCULAR SURGERY  10/29/2018    SJS. Left upper fistulograms/venograms    VASCULAR SURGERY  12/19/2018    SJS. Arch aortogram,left upper arteriograms.  VASCULAR SURGERY  03/06/2019    SJS. Removal of tunneled dialyis catheter right internal jugular vein.          CURRENT MEDICATIONS       Previous Medications    ALBUTEROL SULFATE  (90 BASE) MCG/ACT INHALER    Inhale 2 puffs into the lungs every 4 hours as needed    CLONIDINE (CATAPRES) 0.1 MG TABLET    Take 0.1 mg by mouth 2 times daily as needed     DULOXETINE (CYMBALTA) 60 MG EXTENDED RELEASE CAPSULE    Take 60 mg by mouth daily    INSULIN ASPART (NOVOLOG FLEXPEN) 100 UNIT/ML INJECTION PEN    Inject 5 Units into the skin 3 times daily     INSULIN DETEMIR (LEVEMIR) 100 UNIT/ML INJECTION VIAL    Inject 15 Units into the skin nightly    ISOSORBIDE MONONITRATE (IMDUR) 120 MG EXTENDED RELEASE TABLET    Take 1 tablet by mouth daily    LEVOTHYROXINE (SYNTHROID) 150 MCG TABLET    Take 150 mcg by mouth Daily    LOSARTAN (COZAAR) 25 MG TABLET    Take 25 mg by mouth daily     METOPROLOL (LOPRESSOR) 100 MG TABLET    Take 100 mg by mouth 2 times daily     NIFEDIPINE (ADALAT CC) 60 MG EXTENDED RELEASE TABLET    Take 60 mg by mouth daily    ONDANSETRON (ZOFRAN) 4 MG TABLET    Take 4 mg by mouth every 8 hours as needed for Nausea or Vomiting    ROPINIROLE (REQUIP) 2 MG TABLET    Take 2 mg by mouth nightly     SIMVASTATIN (ZOCOR) 40 MG TABLET    Take 40 mg by mouth nightly        ALLERGIES     Penicillins;  Lamisil advanced [terbinafine]; and Stadol [butorphanol]    FAMILY HISTORY       Family History   Problem Relation Age of Onset    Colon Cancer Mother          at 63    Colon Polyps Mother     Lung Cancer Father          at 66    Colon Polyps Father     Stomach Cancer Sister     Breast Cancer Sister     Depression Daughter 25        committed suicide    Liver Cancer Neg Hx     Liver Disease Neg Hx     Esophageal Cancer Neg Hx     Rectal Cancer Neg Hx           SOCIAL HISTORY       Social History     Socioeconomic History    Marital status:      Spouse name: None    Number of children: 2    Years of education: None    Highest education level: None   Occupational History    None   Social Needs    Financial resource strain: None    Food insecurity:     Worry: None     Inability: None    Transportation needs:     Medical: None     Non-medical: None   Tobacco Use    Smoking status: Current Every Day Smoker     Packs/day: 0.50     Years: 25.00     Pack years: 12.50     Types: Cigarettes     Last attempt to quit: 2017     Years since quittin.8    Smokeless tobacco: Never Used   Substance and Sexual Activity    Alcohol use: No    Drug use: No    Sexual activity: Not Currently     Partners: Male   Lifestyle    Physical activity:     Days per week: None     Minutes per session: None    Stress: None   Relationships    Social connections:     Talks on phone: None     Gets together: None     Attends Mandaen service: None     Active member of club or organization: None     Attends meetings of clubs or organizations: None     Relationship status: None    Intimate partner violence:     Fear of current or ex partner: None     Emotionally abused: None     Physically abused: None     Forced sexual activity: None   Other Topics Concern    None   Social History Narrative    None       SCREENINGS    [place] Coma Scale  Eye Opening: Spontaneous  Best Verbal Response: Oriented  Best Motor Response: Obeys commands  [place]uf Coma Scale Score: 15        PHYSICAL EXAM    (up to 7 for level 4, 8 or more for level 5)     ED Triage Vitals [19 1024]   BP Temp Temp Source Pulse Resp SpO2 Height Weight   (!) 238/86 98 °F (36.7 °C) Oral 65 18 93 % 5' 6\" (1.676 m) 150 lb (68 kg)       Physical Exam   Constitutional: She is oriented to person, place, and time. She appears well-developed. No distress. HENT:   Head: Normocephalic and atraumatic. Eyes: Pupils are equal, round, and reactive to light. No scleral icterus. Neck: Normal range of motion. Neck supple. No JVD present. Cardiovascular: Normal rate, regular rhythm, normal heart sounds and intact distal pulses. Pulmonary/Chest: Effort normal. No respiratory distress. She has rales. Abdominal: Soft. She exhibits no distension. There is no tenderness. Musculoskeletal: She exhibits no edema or tenderness. Neurological: She is alert and oriented to person, place, and time. Skin: Skin is warm and dry. Psychiatric: She has a normal mood and affect.  Her behavior is normal.   Vitals reviewed. DIAGNOSTIC RESULTS     EKG: All EKG's are interpreted by the Emergency Department Physician who either signs or Co-signs this chart in the absence of a cardiologist.    Normal sinus rhythm. Normal QT interval. Borderline T-wave changes in several leads. RADIOLOGY:   Non-plain film images such as CT, Ultrasound and MRI are read by the radiologist. Carie Burrows images are visualized and preliminarily interpreted by the emergency physician with the below findings:    Interpretation per the Radiologist below, if available at the time of this note:    CTA PULMONARY W CONTRAST   Final Result   Impression:   1. No CT angiographic evidence of pulmonary embolus or acute aortic   pathology. 2. Small to moderate-sized pleural effusions with reticular   interstitial/peribronchial thickening with diffuse patchy groundglass   opacities. Cardiomegaly with subcutaneous edema/anasarca change. This   finding suggest pulmonary edema, congestive heart failure/volume   overload, correlate with patient presentation. Signed by Dr Tigist Hayes on 3/27/2019 1:27 PM      XR CHEST STANDARD (2 VW)   Final Result   Development of interstitial pulmonary edema compared to 3 days prior.    Signed by Dr Bethanie Wood on 3/27/2019 12:08 PM          LABS:  Labs Reviewed   CBC - Abnormal; Notable for the following components:       Result Value    WBC 4.7 (*)     RBC 3.52 (*)     Hemoglobin 10.7 (*)     Hematocrit 32.6 (*)     MCHC 32.8 (*)     Platelets 766 (*)     All other components within normal limits   COMPREHENSIVE METABOLIC PANEL - Abnormal; Notable for the following components:    Sodium 127 (*)     Chloride 84 (*)     Glucose 337 (*)     BUN 31 (*)     CREATININE 4.4 (*)     GFR Non-African American 11 (*)     Alkaline Phosphatase 172 (*)     ALT 67 (*)     AST 91 (*)     All other components within normal limits   LIPASE - Abnormal; Notable for the following components:    Lipase 65 (*)     All other components within normal limits   D-DIMER, QUANTITATIVE - Abnormal; Notable for the following components:    D-Dimer, Quant 1.00 (*)     All other components within normal limits   BRAIN NATRIURETIC PEPTIDE - Abnormal; Notable for the following components:    Pro-BNP 35,000 (*)     All other components within normal limits   TROPONIN       All other labs were within normal range or not returned as of this dictation. EMERGENCY DEPARTMENT COURSE and DIFFERENTIALDIAGNOSIS/MDM:   Vitals:    Vitals:    03/27/19 1130 03/27/19 1217 03/27/19 1239 03/27/19 1300   BP: (!) 249/88  (!) 178/78 (!) 170/81   Pulse: 68  69 70   Resp: 17 18 18 18   Temp:       TempSrc:       SpO2: 90%  100% 90%   Weight:       Height:           MDM  Number of Diagnoses or Management Options  Acute pulmonary edema (HCC):   Chronic renal failure, unspecified CKD stage:   Hypertensive emergency:   Critical Care  Total time providing critical care: 30-74 minutes    Patient Progress  Patient progress: improved    Patient's nontoxic on exam and in no distress at this time. She has been started on a Cardene drip and her blood pressure is improving. Currently 171/78 which is much better. Case discussed with hospitalist, Dr. Mariana Reece, who is agreeable with plan of care and admission to ICU. Consult also placed to nephrology with plans for dialysis. CONSULTS:  IP CONSULT TO NEPHROLOGY    PROCEDURES:  Unless otherwise notedbelow, none     Procedures    FINAL IMPRESSION     1. Acute pulmonary edema (HCC)    2. Chronic renal failure, unspecified CKD stage    3. Hypertensive emergency    4. Hyperglycemia    5.  Hyponatremia          DISPOSITION/PLAN   DISPOSITION        PATIENT REFERRED TO:  @FUP@    DISCHARGE MEDICATIONS:  New Prescriptions    No medications on file          (Please note that portions of this note were completed with a voice recognition program.  Efforts were made to edit the dictations butoccasionally words are mis-transcribed.)    Ayleen Patel MD (electronically signed)  AttendingEmergency Physician        Ayleen Patel MD  03/27/19 4084

## 2019-03-27 NOTE — PROGRESS NOTES
Lane Dahl arrived to room # 146. Presented with: Fluid overload  Mental Status: Patient is alert and oriented. Vitals:    03/27/19 1500   BP: (!) 152/64   Pulse: 71   Resp: 16   Temp: 99.2 °F (37.3 °C)   SpO2: 91%     Patient safety contract and falls prevention contract reviewed with patient Yes. Oriented Patient to room. Call light within reach. Yes.   Needs, issues or concerns expressed at this time: no.      Electronically signed by Germain De Oliveira RN on 3/27/2019 at 3:25 PM

## 2019-03-27 NOTE — ED NOTES
Bed: 06  Expected date:   Expected time:   Means of arrival:   Comments:  ems     Darnell Huggins RN  03/27/19 1024

## 2019-03-27 NOTE — H&P
Hospital Medicine  History and Physical    Patient:  Celestino Lai  MRN: [id number]    CHIEF COMPLAINT:  SOB    History Obtained From: Patient    PCP: RMOINA Huerta    HISTORY OF PRESENT ILLNESS:  Ms. Conrad Bobby is a 46 yo female with significant PMH ESRD, IDDM, HTN, hypothyroid, that presents to ER for SOB over the past few days which acutely worsened last night and this AM.  She states that it is worse when laying flat. The patient has not missed any dialysis. She endorses having chills , nausea, diarrhea over the past few days as well and chest pain, substernal, 8/10, substernal, does not radiate, pressure that started today. In the ER, patient was found to have vascular congestion on CXR and SBP > 200. She was started on cardene and transferred to ICU for further care. REVIEW OF SYSTEMS:  14 systems reviewed and negative except as noted above.     Past Medical History:      Diagnosis Date    Arthritis     Chronic kidney disease, stage 5, kidney failure (HCC)     DM (diabetes mellitus) type II controlled with renal manifestation (HCC) 06/1993    Gastroparesis     GERD (gastroesophageal reflux disease)     Hemodialysis patient ([facility] [address]     dialysis on tues, thur, and sat at [facility] clinic    Hypertension     Hypothyroid     Nasal congestion     recent    Restless leg syndrome        Past Surgical History:      Procedure Laterality Date    BREAST SURGERY      infected milk gland removed    CHOLECYSTECTOMY      COLONOSCOPY      DIALYSIS FISTULA CREATION Left 1/25/2019    REVISION LEFT UPPER EXTREMITY AV ACCESS WITH LEFT BRACHIAL ARTERY TO LEFT AXILLARY VEIN WITH  INTERPOSITIONAL ARTEGRAFT   performed by Emely Oliveira MD at [address]  2012    GALLBLADDER SURGERY      [address] Right 1/25/2019    INSERTION OF RIGHT INTERNAL JUGULAR HEMODIALYSIS CATHETER performed by [facility]ely [facility]ira MD at [facility]      pt denies hyst; states ablation    FL COLONOSCOPY W/BIOPSY SINGLE/MULTIPLE N/A 10/20/2017    Dr Ana Langston prep-Tubular AP (-) dysplasia x 2--3 yr recall    NM EGD TRANSORAL BIOPSY SINGLE/MULTIPLE N/A 12/27/2016    Dr Fausto Jo EGD TRANSORAL BIOPSY SINGLE/MULTIPLE N/A 10/20/2017    Dr Ada Messina (-)    TUBAL LIGATION      VASCULAR SURGERY Left 2016    fistula by Dr Sosa Deshpande at [address]  10/02/2017    [facility]. Left upper fistulograms/venograms, balloon angioplasty cephalic vein arch 13F33 conquest.    VASCULAR SURGERY  08/2018    FISTULOGRAM    VASCULAR SURGERY  10/29/2018    [facility]. Left upper fistulograms/venograms    VASCULAR SURGERY  12/19/2018    [facility]. Arch aortogram,left upper arteriograms.  VASCULAR SURGERY  03/06/2019    [facility]. Removal of tunneled dialyis catheter right internal jugular vein. Medications Prior to Admission:    Prior to Admission medications    Medication Sig Start Date End Date Taking?  Authorizing Provider   insulin detemir (LEVEMIR) 100 UNIT/ML injection vial Inject 15 Units into the skin nightly   Yes Historical Provider, MD   losartan (COZAAR) 25 MG tablet Take 25 mg by mouth daily    Yes Historical Provider, MD   albuterol sulfate  (90 Base) MCG/ACT inhaler Inhale 2 puffs into the lungs every 4 hours as needed   Yes Historical Provider, MD   cloNIDine (CATAPRES) 0.1 MG tablet Take 0.1 mg by mouth 2 times daily as needed    Yes Historical Provider, MD   levothyroxine (SYNTHROID) 150 MCG tablet Take 150 mcg by mouth Daily   Yes Historical Provider, MD   metoprolol (LOPRESSOR) 100 MG tablet Take 100 mg by mouth 2 times daily    Yes Historical Provider, MD   NIFEdipine (ADALAT CC) 60 MG extended release tablet Take 60 mg by mouth daily   Yes Historical Provider, MD   isosorbide mononitrate (IMDUR) 120 MG extended release tablet Take 1 tablet by mouth daily 5/27/17  Yes Wayne Aquino,    ondansetron (ZOFRAN) 4 MG tablet Take 4 mg by mouth every 8 hours as needed for Nausea or Vomiting   Yes clubbing, atraumatic, no cyanosis or edema  Neurologic: No focal neurologic deficits, normal sensation, alert and oriented, affect and mood appropriate. Skin: no rashes, nodules. CBC:   Recent Labs     03/24/19 1739 03/27/19  1032   WBC 6.2 4.7*   HGB 12.7 10.7*    114*     BMP:    Recent Labs     03/24/19 1739 03/27/19  1032   * 127*   K 4.5 3.6   CL 86* 84*   CO2 23 29   BUN 42* 31*   CREATININE 4.9* 4.4*   GLUCOSE 274* 337*     Hepatic:   Recent Labs     03/24/19 1739 03/27/19  1032   AST 41* 91*   ALT 40* 67*   BILITOT 0.6 0.6   ALKPHOS 171* 172*     Troponin T:   Recent Labs     03/24/19  2040 03/27/19  1032 03/27/19  1514   TROPONINI 0.02 0.02 0.03     Pro-BNP: No results for input(s): BNP in the last 72 hours. Lipids: No results for input(s): CHOL, HDL in the last 72 hours. Invalid input(s): LDLCALCU  ABGs: No results for input(s): PHART, NXU1JTD, PO2ART, OCI3GEM, BEART, HGBAE, T9OFCDOG, CARBOXHGBART, 02THERAPY in the last 72 hours. INR: No results for input(s): INR in the last 72 hours. A1c:Invalid input(s):  HEMOGLOBIN A1C      Assessment and Plan   # HTN urgency with pulmonary edema  - continue cardene  - resume home meds and wean cardene as able  - nephro consult for fluid management  - Strict Is and Os  - check TTE  - trend trop  - monitor on tele    # Hyponatremia  - monitor serial labs  - nephro consult    # IDDM  - on 15 units levemir and 5 units short acting with meals at home  - will give 10 units long acting and place on SSI  - monitor requirements and adjust as needed    # hypothyroid  - resume home synthroid    # RLS  - resume home Jerrye Cowden, MD  Admitting Hospitalist

## 2019-03-27 NOTE — ED TRIAGE NOTES
Dialysis yesterday begin to feel Sob around 2330 yesterday.  Pt states \"I feel like Im still full of fluid\"

## 2019-03-27 NOTE — ED NOTES
Pt placed on cardiac monitor, nibp, cont o2 sat and warm blanket provided       Zafar Rhodes RN  03/27/19 7689

## 2019-03-28 LAB
EKG P AXIS: 67 DEGREES
EKG P-R INTERVAL: 174 MS
EKG Q-T INTERVAL: 440 MS
EKG QRS DURATION: 90 MS
EKG QTC CALCULATION (BAZETT): 450 MS
EKG T AXIS: 94 DEGREES
GLUCOSE BLD-MCNC: 181 MG/DL (ref 70–99)
GLUCOSE BLD-MCNC: 189 MG/DL (ref 70–99)
GLUCOSE BLD-MCNC: 210 MG/DL (ref 70–99)
GLUCOSE BLD-MCNC: 388 MG/DL (ref 70–99)
GLUCOSE BLD-MCNC: 74 MG/DL (ref 70–99)
HCT VFR BLD CALC: 31.9 % (ref 37–47)
HEMOGLOBIN: 10.1 G/DL (ref 12–16)
LACTIC ACID: 1.1 MMOL/L (ref 0.5–1.9)
MAGNESIUM: 2.5 MG/DL (ref 1.6–2.6)
MCH RBC QN AUTO: 30.6 PG (ref 27–31)
MCHC RBC AUTO-ENTMCNC: 31.7 G/DL (ref 33–37)
MCV RBC AUTO: 96.7 FL (ref 81–99)
PDW BLD-RTO: 13.4 % (ref 11.5–14.5)
PERFORMED ON: ABNORMAL
PERFORMED ON: NORMAL
PLATELET # BLD: 115 K/UL (ref 130–400)
PMV BLD AUTO: 10.5 FL (ref 9.4–12.3)
POTASSIUM REFLEX MAGNESIUM: 5.4 MMOL/L (ref 3.5–5)
RBC # BLD: 3.3 M/UL (ref 4.2–5.4)
SODIUM BLD-SCNC: 133 MMOL/L (ref 136–145)
WBC # BLD: 4.6 K/UL (ref 4.8–10.8)

## 2019-03-28 PROCEDURE — 82948 REAGENT STRIP/BLOOD GLUCOSE: CPT

## 2019-03-28 PROCEDURE — 6370000000 HC RX 637 (ALT 250 FOR IP): Performed by: INTERNAL MEDICINE

## 2019-03-28 PROCEDURE — 6360000002 HC RX W HCPCS: Performed by: INTERNAL MEDICINE

## 2019-03-28 PROCEDURE — 2580000003 HC RX 258: Performed by: INTERNAL MEDICINE

## 2019-03-28 PROCEDURE — 99233 SBSQ HOSP IP/OBS HIGH 50: CPT | Performed by: INTERNAL MEDICINE

## 2019-03-28 PROCEDURE — 83735 ASSAY OF MAGNESIUM: CPT

## 2019-03-28 PROCEDURE — 84295 ASSAY OF SERUM SODIUM: CPT

## 2019-03-28 PROCEDURE — 83605 ASSAY OF LACTIC ACID: CPT

## 2019-03-28 PROCEDURE — 2580000003 HC RX 258: Performed by: EMERGENCY MEDICINE

## 2019-03-28 PROCEDURE — 1210000000 HC MED SURG R&B

## 2019-03-28 PROCEDURE — 6370000000 HC RX 637 (ALT 250 FOR IP)

## 2019-03-28 PROCEDURE — 8010000000 HC HEMODIALYSIS ACUTE INPT

## 2019-03-28 PROCEDURE — 85027 COMPLETE CBC AUTOMATED: CPT

## 2019-03-28 PROCEDURE — 2700000000 HC OXYGEN THERAPY PER DAY

## 2019-03-28 PROCEDURE — 80053 COMPREHEN METABOLIC PANEL: CPT

## 2019-03-28 PROCEDURE — 36415 COLL VENOUS BLD VENIPUNCTURE: CPT

## 2019-03-28 PROCEDURE — 2500000003 HC RX 250 WO HCPCS: Performed by: EMERGENCY MEDICINE

## 2019-03-28 RX ORDER — OXYCODONE HYDROCHLORIDE AND ACETAMINOPHEN 5; 325 MG/1; MG/1
1 TABLET ORAL EVERY 6 HOURS PRN
Status: DISCONTINUED | OUTPATIENT
Start: 2019-03-28 | End: 2019-04-02 | Stop reason: HOSPADM

## 2019-03-28 RX ORDER — HYDRALAZINE HYDROCHLORIDE 20 MG/ML
10 INJECTION INTRAMUSCULAR; INTRAVENOUS EVERY 6 HOURS PRN
Status: DISCONTINUED | OUTPATIENT
Start: 2019-03-28 | End: 2019-04-02 | Stop reason: HOSPADM

## 2019-03-28 RX ORDER — HEPARIN SODIUM 5000 [USP'U]/ML
5000 INJECTION, SOLUTION INTRAVENOUS; SUBCUTANEOUS 2 TIMES DAILY
Status: DISCONTINUED | OUTPATIENT
Start: 2019-03-28 | End: 2019-04-02 | Stop reason: HOSPADM

## 2019-03-28 RX ORDER — NIFEDIPINE 60 MG/1
60 TABLET, EXTENDED RELEASE ORAL DAILY
Status: DISCONTINUED | OUTPATIENT
Start: 2019-03-28 | End: 2019-04-02 | Stop reason: HOSPADM

## 2019-03-28 RX ORDER — FLUTICASONE PROPIONATE 50 MCG
1 SPRAY, SUSPENSION (ML) NASAL DAILY
Status: DISCONTINUED | OUTPATIENT
Start: 2019-03-28 | End: 2019-04-02 | Stop reason: HOSPADM

## 2019-03-28 RX ORDER — CLONIDINE HYDROCHLORIDE 0.1 MG/1
0.1 TABLET ORAL 3 TIMES DAILY
Status: DISCONTINUED | OUTPATIENT
Start: 2019-03-28 | End: 2019-03-30

## 2019-03-28 RX ORDER — CLONIDINE HYDROCHLORIDE 0.1 MG/1
TABLET ORAL
Status: COMPLETED
Start: 2019-03-28 | End: 2019-03-28

## 2019-03-28 RX ORDER — LABETALOL HYDROCHLORIDE 5 MG/ML
10 INJECTION, SOLUTION INTRAVENOUS EVERY 6 HOURS PRN
Status: DISCONTINUED | OUTPATIENT
Start: 2019-03-28 | End: 2019-03-28

## 2019-03-28 RX ADMIN — OXYCODONE AND ACETAMINOPHEN 1 TABLET: 5; 325 TABLET ORAL at 14:20

## 2019-03-28 RX ADMIN — INSULIN LISPRO 2 UNITS: 100 INJECTION, SOLUTION INTRAVENOUS; SUBCUTANEOUS at 19:47

## 2019-03-28 RX ADMIN — OXYCODONE AND ACETAMINOPHEN 1 TABLET: 5; 325 TABLET ORAL at 07:51

## 2019-03-28 RX ADMIN — CLONIDINE HYDROCHLORIDE 0.1 MG: 0.1 TABLET ORAL at 11:38

## 2019-03-28 RX ADMIN — ACETAMINOPHEN 650 MG: 325 TABLET, FILM COATED ORAL at 13:14

## 2019-03-28 RX ADMIN — CLONIDINE HYDROCHLORIDE 0.1 MG: 0.1 TABLET ORAL at 07:36

## 2019-03-28 RX ADMIN — NIFEDIPINE 60 MG: 60 TABLET, FILM COATED, EXTENDED RELEASE ORAL at 07:51

## 2019-03-28 RX ADMIN — HEPARIN SODIUM 5000 UNITS: 5000 INJECTION INTRAVENOUS; SUBCUTANEOUS at 19:47

## 2019-03-28 RX ADMIN — SIMVASTATIN 40 MG: 40 TABLET, FILM COATED ORAL at 19:46

## 2019-03-28 RX ADMIN — FLUTICASONE PROPIONATE 1 SPRAY: 50 SPRAY, METERED NASAL at 11:39

## 2019-03-28 RX ADMIN — CLONIDINE HYDROCHLORIDE 0.1 MG: 0.1 TABLET ORAL at 19:46

## 2019-03-28 RX ADMIN — INSULIN LISPRO 3 UNITS: 100 INJECTION, SOLUTION INTRAVENOUS; SUBCUTANEOUS at 13:25

## 2019-03-28 RX ADMIN — LEVOTHYROXINE SODIUM 150 MCG: 75 TABLET ORAL at 05:41

## 2019-03-28 RX ADMIN — ACETAMINOPHEN 650 MG: 325 TABLET, FILM COATED ORAL at 07:36

## 2019-03-28 RX ADMIN — Medication 10 ML: at 19:46

## 2019-03-28 RX ADMIN — ROPINIROLE HYDROCHLORIDE 2 MG: 2 TABLET, FILM COATED ORAL at 19:46

## 2019-03-28 RX ADMIN — METOPROLOL TARTRATE 100 MG: 50 TABLET, FILM COATED ORAL at 19:54

## 2019-03-28 RX ADMIN — HEPARIN SODIUM 5000 UNITS: 5000 INJECTION INTRAVENOUS; SUBCUTANEOUS at 07:51

## 2019-03-28 RX ADMIN — INSULIN LISPRO 2 UNITS: 100 INJECTION, SOLUTION INTRAVENOUS; SUBCUTANEOUS at 17:44

## 2019-03-28 RX ADMIN — INSULIN LISPRO 10 UNITS: 100 INJECTION, SOLUTION INTRAVENOUS; SUBCUTANEOUS at 07:51

## 2019-03-28 RX ADMIN — ACETAMINOPHEN 650 MG: 325 TABLET, FILM COATED ORAL at 19:46

## 2019-03-28 RX ADMIN — HYDRALAZINE HYDROCHLORIDE 10 MG: 20 INJECTION INTRAMUSCULAR; INTRAVENOUS at 09:06

## 2019-03-28 RX ADMIN — INSULIN LISPRO 3 UNITS: 100 INJECTION, SOLUTION INTRAVENOUS; SUBCUTANEOUS at 17:45

## 2019-03-28 RX ADMIN — CLONIDINE HYDROCHLORIDE 0.1 MG: 0.1 TABLET ORAL at 14:05

## 2019-03-28 RX ADMIN — LISINOPRIL 20 MG: 20 TABLET ORAL at 07:36

## 2019-03-28 RX ADMIN — HYDRALAZINE HYDROCHLORIDE 10 MG: 20 INJECTION INTRAMUSCULAR; INTRAVENOUS at 16:01

## 2019-03-28 RX ADMIN — ISOSORBIDE MONONITRATE 120 MG: 60 TABLET, EXTENDED RELEASE ORAL at 07:36

## 2019-03-28 RX ADMIN — METOPROLOL TARTRATE 100 MG: 50 TABLET, FILM COATED ORAL at 07:36

## 2019-03-28 RX ADMIN — OXYCODONE AND ACETAMINOPHEN 1 TABLET: 5; 325 TABLET ORAL at 21:30

## 2019-03-28 RX ADMIN — DEXTROSE MONOHYDRATE 5 MG/HR: 50 INJECTION, SOLUTION INTRAVENOUS at 05:40

## 2019-03-28 RX ADMIN — INSULIN GLARGINE 10 UNITS: 100 INJECTION, SOLUTION SUBCUTANEOUS at 21:28

## 2019-03-28 ASSESSMENT — PAIN SCALES - GENERAL
PAINLEVEL_OUTOF10: 3
PAINLEVEL_OUTOF10: 7
PAINLEVEL_OUTOF10: 9
PAINLEVEL_OUTOF10: 8
PAINLEVEL_OUTOF10: 3
PAINLEVEL_OUTOF10: 5
PAINLEVEL_OUTOF10: 0
PAINLEVEL_OUTOF10: 8
PAINLEVEL_OUTOF10: 3
PAINLEVEL_OUTOF10: 5
PAINLEVEL_OUTOF10: 6
PAINLEVEL_OUTOF10: 9
PAINLEVEL_OUTOF10: 9

## 2019-03-28 ASSESSMENT — PAIN DESCRIPTION - LOCATION
LOCATION: HEAD
LOCATION: HEAD

## 2019-03-28 ASSESSMENT — PAIN DESCRIPTION - FREQUENCY: FREQUENCY: CONTINUOUS

## 2019-03-28 ASSESSMENT — PAIN - FUNCTIONAL ASSESSMENT: PAIN_FUNCTIONAL_ASSESSMENT: ACTIVITIES ARE NOT PREVENTED

## 2019-03-28 ASSESSMENT — PAIN DESCRIPTION - DESCRIPTORS
DESCRIPTORS: HEADACHE
DESCRIPTORS: HEADACHE

## 2019-03-28 ASSESSMENT — PAIN DESCRIPTION - PAIN TYPE
TYPE: ACUTE PAIN
TYPE: ACUTE PAIN

## 2019-03-28 ASSESSMENT — PAIN DESCRIPTION - ONSET: ONSET: ON-GOING

## 2019-03-28 ASSESSMENT — PAIN DESCRIPTION - PROGRESSION: CLINICAL_PROGRESSION: GRADUALLY WORSENING

## 2019-03-28 NOTE — PROGRESS NOTES
Nephrology ([facility]) progress Note      Patient:  Hai Elizabeth  YOB: 1970  Date of Service: 3/28/2019  MRN: [id number]   Acct: [de-identified]   Primary Care Physician: ROMINA Tijerina  Advance Directive: Full Code  Admit Date: 3/27/2019       Hospital Day: 1  Referring Provider: Jluis Quiros MD    Patient independently seen and examined, Chart, Consults, Notes, Operative notes, Labs, Cardiology, and Radiology studies reviewed as able. Chief complaint: End-stage renal disease/volume overload. Subjective:  Hai Elizabeth is a 47 y.o. female  whom we were consulted for end-stage renal disease. She patient has end-stage renal disease secondary to diabetic nephropathy and goes to [facility] clinic on Tuesday Thursday and Saturday. She presented yesterday with severe shortness of breath/pulmonary edema and severe hypertension. Patient was admitted to ICU, needing emergent dialysis for correction of pulmonary edema. She was also treated with intravenous Cardene which has been weaned off. Currently seen in ICU on dialysis. Hemodialysis access: AV fistula  Hemodialysis: 3 hour 45 minute  Ultrafiltration: 3000 mL  2K bath  Blood flow rate is 450 mL/m. Allergies:  Penicillins;  Lamisil advanced [terbinafine]; and Stadol [butorphanol]    Medicines:  Current Facility-Administered Medications   Medication Dose Route Frequency Provider Last Rate Last Dose    NIFEdipine (PROCARDIA XL) extended release tablet 60 mg  60 mg Oral Daily Julis Quiros MD   60 mg at 03/28/19 0751    oxyCODONE-acetaminophen (PERCOCET) 5-325 MG per tablet 1 tablet  1 tablet Oral Q6H PRN Jluis Quiros MD   1 tablet at 03/28/19 0751    heparin (porcine) injection 5,000 Units  5,000 Units Subcutaneous BID Jluis Quiros MD   5,000 Units at 03/28/19 0751    hydrALAZINE (APRESOLINE) injection 10 mg  10 mg Intravenous Q6H PRN Jluis Quiros MD   10 mg at 03/28/19 0906    insulin lispro (HUMALOG) injection vial 3 Units  3 Units Subcutaneous TID WC Shawanda Thompson MD        niCARdipine (CARDENE) 25 mg in dextrose 5 % 250 mL infusion  5 mg/hr Intravenous Continuous Lawyer Cecilio MD   Stopped at 03/28/19 0847    sodium chloride flush 0.9 % injection 10 mL  10 mL Intravenous 2 times per day Shawanda Thompson MD   10 mL at 03/27/19 2040    sodium chloride flush 0.9 % injection 10 mL  10 mL Intravenous PRN Shawanda Thompson MD        magnesium hydroxide (MILK OF MAGNESIA) 400 MG/5ML suspension 30 mL  30 mL Oral Daily PRN Shawanda Thompson MD        ondansetron [facility]) injection 4 mg  4 mg Intravenous Q6H PRN Shawanda Thompson MD        cloNIDine (CATAPRES) tablet 0.1 mg  0.1 mg Oral BID Shawanda Thompson MD   0.1 mg at 03/28/19 0736    insulin glargine (LANTUS) injection vial 10 Units  10 Units Subcutaneous Nightly Shawanda Thompson MD   10 Units at 03/27/19 2040    isosorbide mononitrate (IMDUR) extended release tablet 120 mg  120 mg Oral Daily Shawanda Thompson MD   120 mg at 03/28/19 0736    levothyroxine (SYNTHROID) tablet 150 mcg  150 mcg Oral Daily Shawanda Thompson MD   150 mcg at 03/28/19 0541    metoprolol tartrate (LOPRESSOR) tablet 100 mg  100 mg Oral BID Shawanda Thompson MD   100 mg at 03/28/19 0736    rOPINIRole (REQUIP) tablet 2 mg  2 mg Oral Nightly Shawanda Thompson MD   2 mg at 03/27/19 2040    simvastatin (ZOCOR) tablet 40 mg  40 mg Oral Nightly Shawanda Thompson MD   40 mg at 03/27/19 2039    glucose (GLUTOSE) 40 % oral gel 15 g  15 g Oral PRN Shawanda Thompson MD        dextrose 50 % solution 12.5 g  12.5 g Intravenous PRN Shawanda Thompson MD        glucagon (rDNA) injection 1 mg  1 mg Intramuscular PRN Shawanda Thompson MD        dextrose 5 % solution  100 mL/hr Intravenous PRN Shawanda Thompson MD        insulin lispro (HUMALOG) injection vial 0-12 Units  0-12 Units Subcutaneous TID [facility] Shawanda Thompson MD   10 Units at 03/28/19 8631    insulin lispro (HUMALOG) injection vial 0-6 Units  0-6 Units Subcutaneous Nightly Julieta Girard MD   1 Units at 03/27/19 2040    acetaminophen (TYLENOL) tablet 650 mg  650 mg Oral Q6H PRN Julieta Girard MD   650 mg at 03/28/19 0736    lisinopril (PRINIVIL;ZESTRIL) tablet 20 mg  20 mg Oral Daily Fatou Hancock MD   20 mg at 03/28/19 9730       Past Medical History:  Past Medical History:   Diagnosis Date    Arthritis     Chronic kidney disease, stage 5, kidney failure (HCC)     DM (diabetes mellitus) type II controlled with renal manifestation (HCC) 06/1993    Gastroparesis     GERD (gastroesophageal reflux disease)     Hemodialysis patient ([facility] [address]     dialysis on tues, thur, and sat at [facility]    Hypertension     Hypothyroid     Nasal congestion     recent    Restless leg syndrome        Past Surgical History:  Past Surgical History:   Procedure Laterality Date    BREAST SURGERY      infected milk gland removed    CHOLECYSTECTOMY      COLONOSCOPY      DIALYSIS FISTULA CREATION Left 1/25/2019    REVISION LEFT UPPER EXTREMITY AV ACCESS WITH LEFT BRACHIAL ARTERY TO LEFT AXILLARY VEIN WITH  INTERPOSITIONAL ARTEGRAFT   performed by Rosa Maria Altman MD at [address]  2012    GALLBLADDER SURGERY      HC DIALYSIS CATHETER Right 1/25/2019    INSERTION OF RIGHT INTERNAL JUGULAR HEMODIALYSIS CATHETER performed [facility] at [facility] denies hyst; states ablation    MS COLONOSCOPY W/BIOPSY SINGLE/MULTIPLE N/A 10/20/2017    Dr Jemal Hernandes prep-Tubular AP (-) dysplasia x 2--3 yr recall    MS EGD TRANSORAL BIOPSY SINGLE/MULTIPLE N/A 12/27/2016    Dr Natalie Delgadillo EGD TRANSORAL BIOPSY SINGLE/MULTIPLE N/A 10/20/2017    Dr Shraddha Valles (-)    TUBAL LIGATION      VASCULAR SURGERY Left 2016    fistula by Dr Loc Childers at [address]  10/02/2017    [facility]. Left upper fistulograms/venograms, balloon angioplasty cephalic vein arch 10x80 conquest.    VASCULAR SURGERY  2018    FISTULOGRAM    VASCULAR SURGERY  10/29/2018    [facility]. Left upper fistulograms/venograms    VASCULAR SURGERY  2018    [facility]. Arch aortogram,left upper arteriograms.  VASCULAR SURGERY  2019    [facility]. Removal of tunneled dialyis catheter right internal jugular vein.        Family History  Family History   Problem Relation Age of Onset    Colon Cancer Mother          at 63    Colon Polyps Mother     Lung Cancer Father          at 66    Colon Polyps Father     Stomach Cancer Sister     Breast Cancer Sister     Depression Daughter 25        committed suicide    Liver Cancer Neg Hx     Liver Disease Neg Hx     Esophageal Cancer Neg Hx     Rectal Cancer Neg Hx        Social History  Social History     Socioeconomic History    Marital status:      Spouse name: Not on file    Number of children: 2    Years of education: Not on file    Highest education level: Not on file   Occupational History    Not on file   Social Needs    Financial resource strain: Not on file    Food insecurity:     Worry: Not on file     Inability: Not on file    Transportation needs:     Medical: Not on file     Non-medical: Not on file   Tobacco Use    Smoking status: Current Every Day Smoker     Packs/day: 0.50     Years: 25.00     Pack years: 12.50     Types: Cigarettes     Last attempt to quit: 2017     Years since quittin.8    Smokeless tobacco: Never Used   Substance and Sexual Activity    Alcohol use: No    Drug use: No    Sexual activity: Not Currently     Partners: Male   Lifestyle    Physical activity:     Days per week: Not on file     Minutes per session: Not on file    Stress: Not on file   Relationships    Social connections:     Talks on phone: Not on file     Gets together: Not on file     Attends Buddhism service: Not on file     Active member of club or organization: Not on file     Attends meetings of clubs or -- --   03/27/19 1130 (!) 249/88 -- -- 68 17 90 % -- --   03/27/19 1100 (!) 233/88 -- -- 71 16 95 % -- --   03/27/19 1030 (!) 239/88 -- -- 66 18 94 % -- --   03/27/19 1024 (!) 238/86 98 °F (36.7 °C) Oral 65 18 93 % 5' 6\" (1.676 m) 150 lb (68 kg)       Intake/Output Summary (Last 24 hours) at 3/28/2019 0957  Last data filed at 3/28/2019 0800  Gross per 24 hour   Intake 1446.67 ml   Output 0 ml   Net 1446.67 ml     General: awake/alert   HEENT: Normocephalic atraumatic head  Neck: Supple with no JVD or carotid bruits. Chest:  clear to auscultation bilaterally without respiratory distress  CVS: regular rate and rhythm  Abdominal: soft, nontender, normal bowel sounds  Extremities: no cyanosis or edema  Skin: warm and dry without rash      Labs:  BMP:   Recent Labs     03/27/19  1032 03/27/19  1808 03/27/19  1912 03/28/19  0125 03/28/19  0652   * 118* 132* 133*  --    K 3.6  --  3.1*  --  5.4*   CL 84*  --  91*  --   --    CO2 29  --  28  --   --    BUN 31*  --  21*  --   --    CREATININE 4.4*  --  3.1*  --   --    CALCIUM 8.9  --  8.6  --   --      CBC:   Recent Labs     03/27/19  1032 03/28/19  0125   WBC 4.7* 4.6*   HGB 10.7* 10.1*   HCT 32.6* 31.9*   MCV 92.6 96.7   * 115*     LIVER PROFILE:   Recent Labs     03/27/19  1032   AST 91*   ALT 67*   LIPASE 65*   BILITOT 0.6   ALKPHOS 172*     PT/INR: No results for input(s): PROTIME, INR in the last 72 hours. APTT: No results for input(s): APTT in the last 72 hours. BNP:  No results for input(s): BNP in the last 72 hours. Ionized Calcium:No results for input(s): IONCA in the last 72 hours. Magnesium:  Recent Labs     03/28/19  0652   MG 2.5     Phosphorus:No results for input(s): PHOS in the last 72 hours. HgbA1C: No results for input(s): LABA1C in the last 72 hours.   Hepatic:   Recent Labs     03/27/19  1032   ALKPHOS 172*   ALT 67*   AST 91*   PROT 7.5   BILITOT 0.6   LABALBU 4.2     Lactic Acid:   Recent Labs     03/28/19  0125   LACTA 1.1

## 2019-03-28 NOTE — PROGRESS NOTES
Eli Walton received from 146 to room # 318 . Mental Status: Patient is oriented, alert, coherent, logical and thought processes intact. Vitals:    03/28/19 1601   BP: (!) 158/77   Pulse:    Resp:    Temp:    SpO2:      Placed on cardiac monitor: Yes. Box # [id number]. Belongings: Clothing with patient  Family at bedside No.  Oriented Patient to room. Call light within reach. Yes. Transfer was: Well tolerated by patient. .    Electronically signed by Mirian Lopez RN on 3/28/2019 at 5:09 PM

## 2019-03-28 NOTE — PLAN OF CARE
Problem: Falls - Risk of:  Goal: Will remain free from falls  Description  Will remain free from falls  Outcome: Ongoing  Goal: Absence of physical injury  Description  Absence of physical injury  Outcome: Ongoing     Problem: Pain:  Goal: Pain level will decrease  Description  Pain level will decrease  Outcome: Ongoing  Goal: Control of acute pain  Description  Control of acute pain  Outcome: Ongoing  Goal: Control of chronic pain  Description  Control of chronic pain  Outcome: Ongoing     Problem: Fluid Volume:  Goal: Hemodynamic stability will improve  Description  Hemodynamic stability will improve  Outcome: Ongoing  Goal: Ability to maintain a balanced intake and output will improve  Description  Ability to maintain a balanced intake and output will improve  Outcome: Ongoing     Problem: Respiratory:  Goal: Respiratory status will improve  Description  Respiratory status will improve  Outcome: Ongoing

## 2019-03-28 NOTE — PROGRESS NOTES
4 Eyes Skin Assessment    Sincere Skinner is being assessed upon: Transfer to New Unit    I agree that Hira Branham, along with Jordi Pelayo (either 2 RN's or 1 LPN and 1 RN) have performed a thorough Head to Toe Skin Assessment on the patient. ALL assessment sites listed below have been assessed. Areas assessed by both nurses:     [x]   Head, Face, and Ears   [x]   Shoulders, Back, and Chest  [x]   Arms, Elbows, and Hands   [x]   Coccyx, Sacrum, and Ischium  [x]   Legs, Feet, and Heels    Does the Patient have Skin Breakdown?  No    Marek Prevention initiated: No  Wound Care Orders initiated: No    WOC nurse consulted for Pressure Injury (Stage 3,4, Unstageable, DTI, NWPT, and Complex wounds) and New or Established Ostomies: No        Primary Nurse eSignature: Nancy Ordonez RN on 3/28/2019 at 5:11 PM      Co-Signer eSignature: Electronically signed by Jordi Pelayo RN on 3/29/19 at 8:22 AM

## 2019-03-28 NOTE — PROGRESS NOTES
Zac Pride transferred to 318 from 146 via wheelchair. Reason for transfer: lower level of care   Explained reason for transfer to Patient. Belongings: None with patient at bedside . Soft chart transferred with patient: Yes. Telemetry box number  transferred with patient: yes. Report given to: Mitchell Armenta, via telephone.       Electronically signed by Kaci Queen RN on 3/28/2019 at 5:20 PM

## 2019-03-28 NOTE — PROGRESS NOTES
Hospitalist Progress Note  3/28/2019 7:57 AM  Subjective:   Admit Date: 3/27/2019  PCP: ROMINA Belcher    Chief Complaint: SOB    Subjective: Patient seen and examined. Complains of HA. States breathing has improved. Denies CP. Place back on cardene this AM.    Cumulative Hospital History:   Ms. Galina Turner is a 46 yo female with significant PMH ESRD, IDDM, HTN, hypothyroid, that presents to ER for SOB over the past few days which acutely worsened last night and this AM.  She states that it is worse when laying flat. The patient has not missed any dialysis. She endorses having chills , nausea, diarrhea over the past few days as well and chest pain, substernal, 8/10, substernal, does not radiate, pressure that started today. In the ER, patient was found to have vascular congestion on CXR and SBP > 200. She was started on cardene and transferred to ICU for further care. 03/28: Received HD yesterday and planned again this AM.  Was placed back on cardene this AM.  Will add back home nifedipine and PRN hydralazine to attempt to wean cardene. Add percocet for pain. Possibly transfer to floor after HD if BP stable. Awaiting TTE results. ROS: 14 point review of systems is negative except as specifically addressed above.     DIET CARDIAC; Carb Control: 4 carb choices (60 gms)/meal; No Caffeine, Renal    Intake/Output Summary (Last 24 hours) at 3/28/2019 0757  Last data filed at 3/28/2019 0600  Gross per 24 hour   Intake 1250.5 ml   Output --   Net 1250.5 ml     Medications:   niCARdipine 4 mg/hr (03/28/19 0737)    dextrose       Current Facility-Administered Medications   Medication Dose Route Frequency Provider Last Rate Last Dose    NIFEdipine (PROCARDIA XL) extended release tablet 60 mg  60 mg Oral Daily Jessie Moon MD   60 mg at 03/28/19 0751    oxyCODONE-acetaminophen (PERCOCET) 5-325 MG per tablet 1 tablet  1 tablet Oral Q6H PRN Jessie Moon MD   1 tablet at 03/28/19 0751    heparin (porcine) injection 5,000 Units  5,000 Units Subcutaneous BID Opal Ferrell MD   5,000 Units at 03/28/19 0751    hydrALAZINE (APRESOLINE) injection 10 mg  10 mg Intravenous Q6H PRN Opal Ferrell MD        insulin lispro (HUMALOG) injection vial 3 Units  3 Units Subcutaneous TID WC Opal Ferrell MD        niCARdipine (CARDENE) 25 mg in dextrose 5 % 250 mL infusion  5 mg/hr Intravenous Continuous Ayden Lopez MD 40 mL/hr at 03/28/19 0737 4 mg/hr at 03/28/19 0737    sodium chloride flush 0.9 % injection 10 mL  10 mL Intravenous 2 times per day Opal Ferrell MD   10 mL at 03/27/19 2040    sodium chloride flush 0.9 % injection 10 mL  10 mL Intravenous PRN Opal Ferrell MD        magnesium hydroxide (MILK OF MAGNESIA) 400 MG/5ML suspension 30 mL  30 mL Oral Daily PRN Opal Ferrell MD        ondansetron [facility]) injection 4 mg  4 mg Intravenous Q6H PRN Opal Ferrell MD        cloNIDine (CATAPRES) tablet 0.1 mg  0.1 mg Oral BID Opal Ferrell MD   0.1 mg at 03/28/19 0736    insulin glargine (LANTUS) injection vial 10 Units  10 Units Subcutaneous Nightly Opal Ferrell MD   10 Units at 03/27/19 2040    isosorbide mononitrate (IMDUR) extended release tablet 120 mg  120 mg Oral Daily Opal Ferrell MD   120 mg at 03/28/19 0736    levothyroxine (SYNTHROID) tablet 150 mcg  150 mcg Oral Daily Opal Ferrell MD   150 mcg at 03/28/19 0541    metoprolol tartrate (LOPRESSOR) tablet 100 mg  100 mg Oral BID Opal Ferrell MD   100 mg at 03/28/19 0736    rOPINIRole (REQUIP) tablet 2 mg  2 mg Oral Nightly Opal Ferrell MD   2 mg at 03/27/19 2040    simvastatin (ZOCOR) tablet 40 mg  40 mg Oral Nightly Opal Ferrell MD   40 mg at 03/27/19 2039    glucose (GLUTOSE) 40 % oral gel 15 g  15 g Oral PRN Opal Ferrell MD        dextrose 50 % solution 12.5 g  12.5 g Intravenous PRN Opal Ferrell MD        glucagon (rDNA) injection 1 mg  1 mg Intramuscular PRN Anabell Denson MD        dextrose 5 % solution  100 mL/hr Intravenous PRN Anabell Denson MD        insulin lispro (HUMALOG) injection vial 0-12 Units  0-12 Units Subcutaneous TID [facility] Anabell Denson MD   10 Units at 03/28/19 0751    insulin lispro (HUMALOG) injection vial 0-6 Units  0-6 Units Subcutaneous Nightly Anabell Denson MD   1 Units at 03/27/19 2040    acetaminophen (TYLENOL) tablet 650 mg  650 mg Oral Q6H PRN Anabell Denson MD   650 mg at 03/28/19 0736    lisinopril (PRINIVIL;ZESTRIL) tablet 20 mg  20 mg Oral Daily Bart Johnston MD   20 mg at 03/28/19 0736        Labs:     Recent Labs     03/27/19  1032 03/28/19  0125   WBC 4.7* 4.6*   RBC 3.52* 3.30*   HGB 10.7* 10.1*   HCT 32.6* 31.9*   MCV 92.6 96.7   MCH 30.4 30.6   MCHC 32.8* 31.7*   * 115*     Recent Labs     03/27/19  1032  03/27/19  1912 03/28/19  0125 03/28/19  0652   *   < > 132* 133* 133*   K 3.6  --  3.1*  --  5.4*   ANIONGAP 14  --  13  --  14   CL 84*  --  91*  --  93*   CO2 29  --  28  --  26   BUN 31*  --  21*  --  34*   CREATININE 4.4*  --  3.1*  --  4.7*   GLUCOSE 337*  --  225*  --  379*   CALCIUM 8.9  --  8.6  --  9.0    < > = values in this interval not displayed. Recent Labs     03/28/19  0652   MG 2.5     Recent Labs     03/27/19  1032 03/28/19  0652   AST 91* 42*   ALT 67* 44*   BILITOT 0.6 0.4   ALKPHOS 172* 143*     ABGs:No results for input(s): PHART, QIP8QQF, PO2ART, JBQ3MLE, BEART, HGBAE, E9FAHNZE, CARBOXHGBART, 02THERAPY in the last 72 hours. HgBA1c: No results for input(s): LABA1C in the last 72 hours. FLP:    Lab Results   Component Value Date    TRIG 39 03/06/2018    HDL 92 03/06/2018    LDLCALC 59 03/06/2018     TSH:    Lab Results   Component Value Date    TSH 11.250 03/06/2018     Troponin T:   Recent Labs     03/27/19  1032 03/27/19  1514 03/27/19  1808   TROPONINI 0.02 0.03 0.02     INR: No results for input(s): INR in the last 72 hours.     Objective:   Vitals: BP (!) 151/65   Pulse 66   Temp 99.2 °F (37.3 °C) (Temporal)   Resp 12   Ht 5' 6\" (1.676 m)   Wt 150 lb (68 kg)   SpO2 99%   BMI 24.21 kg/m²   24HR INTAKE/OUTPUT:      Intake/Output Summary (Last 24 hours) at 3/28/2019 0757  Last data filed at 3/28/2019 0600  Gross per 24 hour   Intake 1250.5 ml   Output --   Net 1250.5 ml     General appearance: alert and cooperative with exam  HEENT: atraumatic, eyes with clear conjunctiva and normal lids, pupils and irises normal, external ears and nose are normal, lips normal.  Neck without masses, lympadenopathy, bruit, thyroid normal  Lungs: no increased work of breathing, diminished breath sounds bilaterally  Heart: regular rate and rhythm and S1, S2 normal  Abdomen: soft, non-tender; bowel sounds normal; no masses,  no organomegaly  Extremities: extremities normal, atraumatic, no cyanosis or edema  Neurologic: No focal neurologic deficits, normal sensation, alert and oriented, affect and mood appropriate. Skin: no rashes, nodules.     Assessment and Plan:   # HTN urgency with pulmonary edema  - continue cardene PRN, wean as tolerated  - resume home nifedipine  - PRN hydralazine  - nephro consult for fluid management  - Strict Is and Os  - check TTE, pending  - trend trop, WNL  - monitor on tele     # Hyponatremia: improved  - monitor serial labs  - nephro consult     # IDDM  - on 15 units levemir and 5 units short acting with meals at home  - Continue 10 units long acting, add 3 units premeal and cover on SSI  - monitor requirements and adjust as needed     # hypothyroid  - resume home synthroid     # RLS  - resume home requip          Advance Directive: Full Code    DVT prophylaxis: heparin subq    Discharge planning: TBD      Michael Grijalva MD  [facility] Hospitalist

## 2019-03-29 LAB
ALBUMIN SERPL-MCNC: 3.3 G/DL (ref 3.5–5.2)
ALP BLD-CCNC: 121 U/L (ref 35–104)
ALT SERPL-CCNC: 31 U/L (ref 5–33)
ANION GAP SERPL CALCULATED.3IONS-SCNC: 12 MMOL/L (ref 7–19)
AST SERPL-CCNC: 24 U/L (ref 5–32)
BASOPHILS ABSOLUTE: 0.1 K/UL (ref 0–0.2)
BASOPHILS RELATIVE PERCENT: 1.8 % (ref 0–1)
BILIRUB SERPL-MCNC: <0.2 MG/DL (ref 0.2–1.2)
BUN BLDV-MCNC: 27 MG/DL (ref 6–20)
CALCIUM SERPL-MCNC: 8.7 MG/DL (ref 8.6–10)
CHLORIDE BLD-SCNC: 93 MMOL/L (ref 98–111)
CO2: 27 MMOL/L (ref 22–29)
CREAT SERPL-MCNC: 4 MG/DL (ref 0.5–0.9)
EOSINOPHILS ABSOLUTE: 0.2 K/UL (ref 0–0.6)
EOSINOPHILS RELATIVE PERCENT: 5 % (ref 0–5)
GFR NON-AFRICAN AMERICAN: 12
GLUCOSE BLD-MCNC: 105 MG/DL (ref 70–99)
GLUCOSE BLD-MCNC: 240 MG/DL (ref 70–99)
GLUCOSE BLD-MCNC: 283 MG/DL (ref 70–99)
GLUCOSE BLD-MCNC: 291 MG/DL (ref 74–109)
GLUCOSE BLD-MCNC: 315 MG/DL (ref 70–99)
HCT VFR BLD CALC: 31 % (ref 37–47)
HEMOGLOBIN: 9.7 G/DL (ref 12–16)
LYMPHOCYTES ABSOLUTE: 1.2 K/UL (ref 1.1–4.5)
LYMPHOCYTES RELATIVE PERCENT: 36.4 % (ref 20–40)
MCH RBC QN AUTO: 29.9 PG (ref 27–31)
MCHC RBC AUTO-ENTMCNC: 31.3 G/DL (ref 33–37)
MCV RBC AUTO: 95.7 FL (ref 81–99)
MONOCYTES ABSOLUTE: 0.3 K/UL (ref 0–0.9)
MONOCYTES RELATIVE PERCENT: 10.1 % (ref 0–10)
NEUTROPHILS ABSOLUTE: 1.6 K/UL (ref 1.5–7.5)
NEUTROPHILS RELATIVE PERCENT: 46.4 % (ref 50–65)
PDW BLD-RTO: 13.7 % (ref 11.5–14.5)
PERFORMED ON: ABNORMAL
PLATELET # BLD: 102 K/UL (ref 130–400)
PMV BLD AUTO: 10.8 FL (ref 9.4–12.3)
POTASSIUM SERPL-SCNC: 4.9 MMOL/L (ref 3.5–5)
RBC # BLD: 3.24 M/UL (ref 4.2–5.4)
SODIUM BLD-SCNC: 132 MMOL/L (ref 136–145)
TOTAL PROTEIN: 5.9 G/DL (ref 6.6–8.7)
WBC # BLD: 3.4 K/UL (ref 4.8–10.8)

## 2019-03-29 PROCEDURE — 6360000002 HC RX W HCPCS: Performed by: INTERNAL MEDICINE

## 2019-03-29 PROCEDURE — 1210000000 HC MED SURG R&B

## 2019-03-29 PROCEDURE — 80053 COMPREHEN METABOLIC PANEL: CPT

## 2019-03-29 PROCEDURE — 6370000000 HC RX 637 (ALT 250 FOR IP): Performed by: INTERNAL MEDICINE

## 2019-03-29 PROCEDURE — 2580000003 HC RX 258: Performed by: INTERNAL MEDICINE

## 2019-03-29 PROCEDURE — 36415 COLL VENOUS BLD VENIPUNCTURE: CPT

## 2019-03-29 PROCEDURE — 82948 REAGENT STRIP/BLOOD GLUCOSE: CPT

## 2019-03-29 PROCEDURE — 2700000000 HC OXYGEN THERAPY PER DAY

## 2019-03-29 PROCEDURE — 85025 COMPLETE CBC W/AUTO DIFF WBC: CPT

## 2019-03-29 PROCEDURE — 99233 SBSQ HOSP IP/OBS HIGH 50: CPT | Performed by: INTERNAL MEDICINE

## 2019-03-29 RX ADMIN — Medication 10 ML: at 08:05

## 2019-03-29 RX ADMIN — CLONIDINE HYDROCHLORIDE 0.1 MG: 0.1 TABLET ORAL at 08:05

## 2019-03-29 RX ADMIN — CLONIDINE HYDROCHLORIDE 0.1 MG: 0.1 TABLET ORAL at 21:59

## 2019-03-29 RX ADMIN — INSULIN LISPRO 3 UNITS: 100 INJECTION, SOLUTION INTRAVENOUS; SUBCUTANEOUS at 08:10

## 2019-03-29 RX ADMIN — INSULIN LISPRO 6 UNITS: 100 INJECTION, SOLUTION INTRAVENOUS; SUBCUTANEOUS at 17:47

## 2019-03-29 RX ADMIN — ROPINIROLE HYDROCHLORIDE 2 MG: 2 TABLET, FILM COATED ORAL at 21:59

## 2019-03-29 RX ADMIN — METOPROLOL TARTRATE 100 MG: 50 TABLET, FILM COATED ORAL at 21:54

## 2019-03-29 RX ADMIN — HYDRALAZINE HYDROCHLORIDE 10 MG: 20 INJECTION INTRAMUSCULAR; INTRAVENOUS at 19:06

## 2019-03-29 RX ADMIN — ISOSORBIDE MONONITRATE 120 MG: 60 TABLET, EXTENDED RELEASE ORAL at 08:05

## 2019-03-29 RX ADMIN — INSULIN LISPRO 2 UNITS: 100 INJECTION, SOLUTION INTRAVENOUS; SUBCUTANEOUS at 22:00

## 2019-03-29 RX ADMIN — INSULIN GLARGINE 10 UNITS: 100 INJECTION, SOLUTION SUBCUTANEOUS at 21:59

## 2019-03-29 RX ADMIN — HEPARIN SODIUM 5000 UNITS: 5000 INJECTION INTRAVENOUS; SUBCUTANEOUS at 13:52

## 2019-03-29 RX ADMIN — HYDRALAZINE HYDROCHLORIDE 10 MG: 20 INJECTION INTRAMUSCULAR; INTRAVENOUS at 12:25

## 2019-03-29 RX ADMIN — OXYCODONE AND ACETAMINOPHEN 1 TABLET: 5; 325 TABLET ORAL at 08:04

## 2019-03-29 RX ADMIN — METOPROLOL TARTRATE 100 MG: 50 TABLET, FILM COATED ORAL at 08:05

## 2019-03-29 RX ADMIN — NIFEDIPINE 60 MG: 60 TABLET, FILM COATED, EXTENDED RELEASE ORAL at 08:04

## 2019-03-29 RX ADMIN — CLONIDINE HYDROCHLORIDE 0.1 MG: 0.1 TABLET ORAL at 13:54

## 2019-03-29 RX ADMIN — OXYCODONE AND ACETAMINOPHEN 1 TABLET: 5; 325 TABLET ORAL at 19:17

## 2019-03-29 RX ADMIN — INSULIN LISPRO 8 UNITS: 100 INJECTION, SOLUTION INTRAVENOUS; SUBCUTANEOUS at 08:10

## 2019-03-29 RX ADMIN — SIMVASTATIN 40 MG: 40 TABLET, FILM COATED ORAL at 21:59

## 2019-03-29 RX ADMIN — LISINOPRIL 20 MG: 20 TABLET ORAL at 08:05

## 2019-03-29 RX ADMIN — INSULIN LISPRO 3 UNITS: 100 INJECTION, SOLUTION INTRAVENOUS; SUBCUTANEOUS at 17:47

## 2019-03-29 RX ADMIN — LEVOTHYROXINE SODIUM 150 MCG: 75 TABLET ORAL at 06:54

## 2019-03-29 RX ADMIN — FLUTICASONE PROPIONATE 1 SPRAY: 50 SPRAY, METERED NASAL at 08:04

## 2019-03-29 RX ADMIN — Medication 10 ML: at 19:06

## 2019-03-29 RX ADMIN — ONDANSETRON 4 MG: 2 INJECTION INTRAMUSCULAR; INTRAVENOUS at 12:25

## 2019-03-29 RX ADMIN — HEPARIN SODIUM 5000 UNITS: 5000 INJECTION INTRAVENOUS; SUBCUTANEOUS at 21:58

## 2019-03-29 ASSESSMENT — PAIN DESCRIPTION - PROGRESSION: CLINICAL_PROGRESSION: GRADUALLY IMPROVING

## 2019-03-29 ASSESSMENT — PAIN DESCRIPTION - DESCRIPTORS: DESCRIPTORS: HEADACHE

## 2019-03-29 ASSESSMENT — PAIN DESCRIPTION - PAIN TYPE: TYPE: ACUTE PAIN

## 2019-03-29 ASSESSMENT — PAIN SCALES - GENERAL
PAINLEVEL_OUTOF10: 4
PAINLEVEL_OUTOF10: 4
PAINLEVEL_OUTOF10: 8
PAINLEVEL_OUTOF10: 8

## 2019-03-29 ASSESSMENT — PAIN DESCRIPTION - LOCATION: LOCATION: HEAD

## 2019-03-29 NOTE — PROGRESS NOTES
MD        [facility]) [facility] 4 mg  4 mg Intravenous Q6H PRN Kinsey Henley MD        insulin glargine (LANTUS) injection vial 10 Units  10 Units Subcutaneous Nightly Kinsey Henley MD   10 Units at 03/28/19 2128    isosorbide mononitrate (IMDUR) extended release tablet 120 mg  120 mg Oral Daily Kinsey Henley MD   120 mg at 03/29/19 0805    levothyroxine (SYNTHROID) tablet 150 mcg  150 mcg Oral Daily Kinsey Henley MD   150 mcg at 03/29/19 0654    metoprolol tartrate (LOPRESSOR) tablet 100 mg  100 mg Oral BID Kinsey Henley MD   100 mg at 03/29/19 0805    rOPINIRole (REQUIP) tablet 2 mg  2 mg Oral Nightly Kinsey Henley MD   2 mg at 03/28/19 1946    simvastatin (ZOCOR) tablet 40 mg  40 mg Oral Nightly Kinsey Henley MD   40 mg at 03/28/19 1946    glucose (GLUTOSE) 40 % oral gel 15 g  15 g Oral PRN Kinsey Henley MD        dextrose 50 % solution 12.5 g  12.5 g Intravenous PRN Kinsey Henley MD        glucagon (rDNA) injection 1 mg  1 mg Intramuscular PRN Kinsey Henley MD        dextrose 5 % solution  100 mL/hr Intravenous PRN Kinsey Henley MD        insulin lispro (HUMALOG) injection vial 0-12 Units  0-12 Units Subcutaneous TID [facility] Kinsey Henley MD   8 Units at 03/29/19 0810    insulin lispro (HUMALOG) injection vial 0-6 Units  0-6 Units Subcutaneous Nightly Kinsey Henley MD   2 Units at 03/28/19 1947    acetaminophen (TYLENOL) tablet 650 mg  650 mg Oral Q6H PRN Kinsey Henley MD   650 mg at 03/28/19 1946    lisinopril (PRINIVIL;ZESTRIL) tablet 20 mg  20 mg Oral Daily Kinsey Henley MD   20 mg at 03/29/19 0805        Labs:     Recent Labs     03/27/19  1032 03/28/19  0125 03/29/19  0419   WBC 4.7* 4.6* 3.4*   RBC 3.52* 3.30* 3.24*   HGB 10.7* 10.1* 9.7*   HCT 32.6* 31.9* 31.0*   MCV 92.6 96.7 95.7   MCH 30.4 30.6 29.9   MCHC 32.8* 31.7* 31.3*   * 115* 102*     Recent Labs     03/27/19  1032  03/27/19  1912 03/28/19  0125 low-attenuation changes  without definite lymphadenopathy. Peribronchial thickening noted in  the hilar regions without significant lymph node enlargement. Calcified left hilar lymph nodes appreciated. There is mild reflux of contrast material into the inferior vena cava  and hepatic veins suggesting elevated right heart pressures. The pulmonary arteries opacify without iodinated contrast without  intraluminal filling defects or CT angiographic evidence of pulmonary  embolus. Aorta is normal in caliber, without aneurysm or dissection. Left-sided subclavian venous stent is observed. Limited imaging of the upper abdomen is unremarkable. No acute osseous abnormalities identified.     Impression:       Impression:  1. No CT angiographic evidence of pulmonary embolus or acute aortic  pathology. 2. Small to moderate-sized pleural effusions with reticular  interstitial/peribronchial thickening with diffuse patchy groundglass  opacities. Cardiomegaly with subcutaneous edema/anasarca change. This  finding suggest pulmonary edema, congestive heart failure/volume  overload, correlate with patient presentation. Signed by Dr Shahbaz Reich on 3/27/2019 1:27 PM     XR CHEST STANDARD (2 VW) [[id number]] Resulted: 03/27/19 1208     Order Status: Completed Specimen: Chest Updated: 03/27/19 1210     Narrative:       History:  47year-old with dyspnea. Reference:  Chest radiograph March 24, 2019. Findings:  Frontal and lateral chest radiographs performed. Heart mildly enlarged. From 3 days ago there is new interstitial edema  predominantly at the bases. No significant pleural effusion. No  pneumothorax. Left subclavian vascular stent. No acute osseous  abnormalities.     Impression:       Development of interstitial pulmonary edema compared to 3 days prior.   Signed by Dr Ling Charlton on 3/27/2019 12:08 PM           Objective:   Vitals: BP (!) 187/75   Pulse 62   Temp 98.3 °F (36.8 °C) (Temporal)   Resp 18   Ht 5' 6\" (1.676

## 2019-03-29 NOTE — PROGRESS NOTES
lispro (HUMALOG) injection vial 3 Units  3 Units Subcutaneous TID WC Prasanna Lomas MD   3 Units at 03/29/19 0810    fluticasone (FLONASE) 50 MCG/ACT nasal spray 1 spray  1 spray Each Nare Daily Prasanna Lomas MD   1 spray at 03/29/19 0804    cloNIDine (CATAPRES) tablet 0.1 mg  0.1 mg Oral TID Prasanna Lomas MD   0.1 mg at 03/29/19 0805    sodium chloride flush 0.9 % injection 10 mL  10 mL Intravenous 2 times per day Prasanna Lomas MD   10 mL at 03/29/19 0805    sodium chloride flush 0.9 % injection 10 mL  10 mL Intravenous PRN Prasanna Lomas MD        magnesium hydroxide (MILK OF MAGNESIA) 400 MG/5ML suspension 30 mL  30 mL Oral Daily PRN Prasanna Lomas MD        ondansetron [facility]) injection 4 mg  4 mg Intravenous Q6H PRN Prasanna Lomas MD        insulin glargine (LANTUS) injection vial 10 Units  10 Units Subcutaneous Nightly Prasanna Lomas MD   10 Units at 03/28/19 2128    isosorbide mononitrate (IMDUR) extended release tablet 120 mg  120 mg Oral Daily Prasanna Lomas MD   120 mg at 03/29/19 0805    levothyroxine (SYNTHROID) tablet 150 mcg  150 mcg Oral Daily Prasanna Lomas MD   150 mcg at 03/29/19 0654    metoprolol tartrate (LOPRESSOR) tablet 100 mg  100 mg Oral BID Prasanna Lomas MD   100 mg at 03/29/19 0805    rOPINIRole (REQUIP) tablet 2 mg  2 mg Oral Nightly Prasanna Lomas MD   2 mg at 03/28/19 1946    simvastatin (ZOCOR) tablet 40 mg  40 mg Oral Nightly Prasanna Lomas MD   40 mg at 03/28/19 1946    glucose (GLUTOSE) 40 % oral gel 15 g  15 g Oral PRN Prasanna Lomas MD        dextrose 50 % solution 12.5 g  12.5 g Intravenous PRN Prasanna Lomas MD        glucagon (rDNA) injection 1 mg  1 mg Intramuscular PRN Prasanna Lomas MD        dextrose 5 % solution  100 mL/hr Intravenous PRN Prasanna Lomas MD        insulin lispro (HUMALOG) injection vial 0-12 Units  0-12 Units Subcutaneous TID  Prasanna Lomas MD   8 Units at 03/29/19 0810    insulin lispro (HUMALOG) injection vial 0-6 Units  0-6 Units Subcutaneous Nightly Claude Chirinos MD   2 Units at 03/28/19 1947    acetaminophen (TYLENOL) tablet 650 mg  650 mg Oral Q6H PRN Claude Chirinos MD   650 mg at 03/28/19 1946    lisinopril (PRINIVIL;ZESTRIL) tablet 20 mg  20 mg Oral Daily Claude Chirinos MD   20 mg at 03/29/19 0805       Past Medical History:  Past Medical History:   Diagnosis Date    Arthritis     [facility] kidney disease, stage 5, [facility] failure [facility] [address]     DM (diabetes mellitus) type II controlled with renal manifestation ([facility] [address] 06/1993    Gastroparesis     GERD (gastroesophageal reflux disease)     Hemodialysis patient ([facility] [address]     dialysis on tues, thur, and sat at [facility]    Hypertension     Hypothyroid     Nasal congestion     recent    Restless leg syndrome        Past Surgical History:  Past Surgical History:   Procedure Laterality Date    BREAST SURGERY      infected milk gland removed    CHOLECYSTECTOMY      COLONOSCOPY      DIALYSIS FISTULA CREATION Left 1/25/2019    REVISION LEFT UPPER EXTREMITY AV ACCESS WITH LEFT BRACHIAL ARTERY TO LEFT AXILLARY VEIN WITH  INTERPOSITIONAL ARTEGRAFT   performed by Meri Contreras MD at [address]  2012    GALLBLADDER SURGERY      HC DIALYSIS CATHETER Right 1/25/2019    INSERTION OF RIGHT INTERNAL JUGULAR HEMODIALYSIS CATHETER [facility] states [facility]    AR COLONOSCOPY W/BIOPSY SINGLE/MULTIPLE N/A 10/20/2017    Dr Mathieu Beckwith prep-Tubular AP (-) dysplasia x 2--3 yr recall    AR EGD TRANSORAL BIOPSY SINGLE/MULTIPLE N/A 12/27/2016    Dr Jaylon Duran EGD TRANSORAL BIOPSY SINGLE/MULTIPLE N/A 10/20/2017    Dr Maru Oliveira (-)    TUBAL LIGATION      VASCULAR SURGERY Left 2016    fistula by Dr Korene Phoenix at [address]  10/02/2017    [facility]. Left upper fistulograms/venograms, balloon angioplasty cephalic vein arch 05W70 conquest.    VASCULAR SURGERY  2018    FISTULOGRAM    VASCULAR SURGERY  10/29/2018    [facility]. Left upper fistulograms/venograms    VASCULAR SURGERY  2018    [facility]. Arch aortogram,left upper arteriograms.  VASCULAR SURGERY  2019    [facility]. Removal of tunneled dialyis catheter right internal jugular vein.        Family History  Family History   Problem Relation Age of Onset    Colon Cancer Mother          at 63    Colon Polyps Mother     Lung Cancer Father          at 66    Colon Polyps Father     Stomach Cancer Sister     Breast Cancer Sister     Depression Daughter 25        committed suicide    Liver Cancer Neg Hx     Liver Disease Neg Hx     Esophageal Cancer Neg Hx     Rectal Cancer Neg Hx        Social History  Social History     Socioeconomic History    Marital status:      Spouse name: Not on file    Number of children: 2    Years of education: Not on file    Highest education level: Not on file   Occupational History    Not on file   Social Needs    Financial resource strain: Not on file    Food insecurity:     Worry: Not on file     Inability: Not on file    Transportation needs:     Medical: Not on file     Non-medical: Not on file   Tobacco Use    Smoking status: Current Every Day Smoker     Packs/day: 0.50     Years: 25.00     Pack years: 12.50     Types: Cigarettes     Last attempt to quit: 2017     Years since quittin.8    Smokeless tobacco: Never Used   Substance and Sexual Activity    Alcohol use: No    Drug use: No    Sexual activity: Not Currently     Partners: Male   Lifestyle    Physical activity:     Days per week: Not on file     Minutes per session: Not on file    Stress: Not on file   Relationships    Social connections:     Talks on phone: Not on file     Gets together: Not on file     Attends [employer] service: Not on file     Active member of club or organization: Not on file     Attends meetings of clubs HEENT: Normocephalic atraumatic head  Neck: Supple with no JVD or carotid bruits. Chest:  clear to auscultation bilaterally without respiratory distress  CVS: regular rate and rhythm  Abdominal: soft, nontender, normal bowel sounds  Extremities: no cyanosis or edema  Skin: warm and dry without rash      Labs:  BMP:   Recent Labs     03/27/19  1032  03/27/19  1912 03/28/19  0125 03/28/19  0652 03/29/19  0419   *   < > 132* 133*  --  132*   K 3.6  --  3.1*  --  5.4* 4.9   CL 84*  --  91*  --   --  93*   CO2 29  --  28  --   --  27   BUN 31*  --  21*  --   --  27*   CREATININE 4.4*  --  3.1*  --   --  4.0*   CALCIUM 8.9  --  8.6  --   --  8.7    < > = values in this interval not displayed. CBC:   Recent Labs     03/27/19 1032 03/28/19  0125 03/29/19  0419   WBC 4.7* 4.6* 3.4*   HGB 10.7* 10.1* 9.7*   HCT 32.6* 31.9* 31.0*   MCV 92.6 96.7 95.7   * 115* 102*     LIVER PROFILE:   Recent Labs     03/27/19 1032 03/29/19  0419   AST 91* 24   ALT 67* 31   LIPASE 65*  --    BILITOT 0.6 <0.2   ALKPHOS 172* 121*     PT/INR: No results for input(s): PROTIME, INR in the last 72 hours. APTT: No results for input(s): APTT in the last 72 hours. BNP:  No results for input(s): BNP in the last 72 hours. Ionized Calcium:No results for input(s): IONCA in the last 72 hours. Magnesium:  Recent Labs     03/28/19  0652   MG 2.5     Phosphorus:No results for input(s): PHOS in the last 72 hours. HgbA1C: No results for input(s): LABA1C in the last 72 hours. Hepatic:   Recent Labs     03/27/19  1032 03/29/19  0419   ALKPHOS 172* 121*   ALT 67* 31   AST 91* 24   PROT 7.5 5.9*   BILITOT 0.6 <0.2   LABALBU 4.2 3.3*     Lactic Acid:   Recent Labs     03/28/19  0125   LACTA 1.1     Troponin: No results for input(s): CKTOTAL, CKMB, TROPONINT in the last 72 hours. ABGs: No results for input(s): PH, PCO2, PO2, HCO3, O2SAT in the last 72 hours. CRP:  No results for input(s): CRP in the last 72 hours.   Sed Rate:  No results for input(s): SEDRATE in the last 72 hours. Cultures:   No results for input(s): CULTURE in the last 72 hours. No results for input(s): BC, Jesus Lama in the last 72 hours. No results for input(s): CXSURG in the last 72 hours. Radiology reports as per the Radiologist  Radiology: Xr Chest Standard (2 Vw)    Result Date: 3/27/2019  History: 58-year-old with dyspnea. Reference: Chest radiograph March 24, 2019. Findings: Frontal and lateral chest radiographs performed. Heart mildly enlarged. From 3 days ago there is new interstitial edema predominantly at the bases. No significant pleural effusion. No pneumothorax. Left subclavian vascular stent. No acute osseous abnormalities. Development of interstitial pulmonary edema compared to 3 days prior. Signed by Dr Evan Casas on 3/27/2019 12:08 PM    Cta Pulmonary W Contrast    Result Date: 3/27/2019  CTA PULMONARY W CONTRAST 3/27/2019 10:45 AM HISTORY: Dyspnea, clinical concerns for pulmonary embolus Comparison: 2/11/2019 chest CT Technique: CT evaluation of the chest was performed with intravenous contrast. 2 mm transaxial images were obtained. Maximum intensity projection reconstruction angiographic images were generated . Two-dimensional Coronal and sagittal reconstruction images of the pulmonary arteries, aorta and great vessels were also generated. .Radiation dose equals  mGy cm. AUTOMATED EXPOSURE CONTROL dose reduction technique was implemented Findings: There are small to moderate-sized bilateral pleural effusions, layering posteriorly. There is reticular nodular interstitial changes diffusely in the lungs with peribronchial cuffing. There are patchy groundglass opacities in the upper lobes with more confluent groundglass opacities in the lower lobes. There is cardiomegaly with panchamber enlargement. There is diffuse body wall edema with anasarca change. Differential considerations include pulmonary edema/congestive heart failure-volume overload. The mediastinum appears edematous with diffuse low-attenuation changes without definite lymphadenopathy. Peribronchial thickening noted in the hilar regions without significant lymph node enlargement. Calcified left hilar lymph nodes appreciated. There is mild reflux of contrast material into the inferior vena cava and hepatic veins suggesting elevated right heart pressures. The pulmonary arteries opacify without iodinated contrast without intraluminal filling defects or CT angiographic evidence of pulmonary embolus. Aorta is normal in caliber, without aneurysm or dissection. Left-sided subclavian venous stent is observed. Limited imaging of the upper abdomen is unremarkable. No acute osseous abnormalities identified. Impression: 1. No CT angiographic evidence of pulmonary embolus or acute aortic pathology. 2. Small to moderate-sized pleural effusions with reticular interstitial/peribronchial thickening with diffuse patchy groundglass opacities. Cardiomegaly with subcutaneous edema/anasarca change. This finding suggest pulmonary edema, congestive heart failure/volume overload, correlate with patient presentation. Signed by Dr Lourdes Monk on 3/27/2019 1:27 PM       Assessment   1. End-stage renal disease  2. Diabetic nephropathy. 3. Insulin-dependent type II diabetes. 4. Pulmonary edema now resolved. 5. Congestive heart failure exacerbation. 6. Hyponatremia. 7. Pancytopenia. 8. Secondary hyperparathyroidism. 9. Chronic obstructive pulmonary disease/active tobacco use      Plan:  1. Routine hemodialysis treatment in the morning. 2. Counseling about fluid restriction in between treatments.         Tej Carr MD  03/29/19  10:06 AM

## 2019-03-29 NOTE — PROGRESS NOTES
Patient c/o trouble breathing and a headache, she describes as having a hard time getting a \"deep breath\", she appears like she is anxious, but denies any feelings of anxiety. Vitals:    NSR 66 per tele      Temp (°F) 98.2  Pulse 64  Resp 16   /64   SpO2 (%) 100 on 2L via NC    Lung sounds clear    Stated she was very hungry, gave patient sandwich. Provided fan.   Will continue to monitor and notify hospitalist.    Currently sleeping

## 2019-03-30 LAB
ALBUMIN SERPL-MCNC: 3.4 G/DL (ref 3.5–5.2)
ALP BLD-CCNC: 130 U/L (ref 35–104)
ALT SERPL-CCNC: 28 U/L (ref 5–33)
ANION GAP SERPL CALCULATED.3IONS-SCNC: 15 MMOL/L (ref 7–19)
AST SERPL-CCNC: 24 U/L (ref 5–32)
BASOPHILS ABSOLUTE: 0.1 K/UL (ref 0–0.2)
BASOPHILS RELATIVE PERCENT: 1.7 % (ref 0–1)
BILIRUB SERPL-MCNC: 0.3 MG/DL (ref 0.2–1.2)
BUN BLDV-MCNC: 47 MG/DL (ref 6–20)
CALCIUM SERPL-MCNC: 9.2 MG/DL (ref 8.6–10)
CHLORIDE BLD-SCNC: 91 MMOL/L (ref 98–111)
CO2: 23 MMOL/L (ref 22–29)
CREAT SERPL-MCNC: 5.5 MG/DL (ref 0.5–0.9)
EOSINOPHILS ABSOLUTE: 0.2 K/UL (ref 0–0.6)
EOSINOPHILS RELATIVE PERCENT: 5.9 % (ref 0–5)
GFR NON-AFRICAN AMERICAN: 8
GLUCOSE BLD-MCNC: 149 MG/DL (ref 74–109)
GLUCOSE BLD-MCNC: 233 MG/DL (ref 70–99)
GLUCOSE BLD-MCNC: 362 MG/DL (ref 70–99)
HCT VFR BLD CALC: 31.8 % (ref 37–47)
HEMOGLOBIN: 9.9 G/DL (ref 12–16)
LYMPHOCYTES ABSOLUTE: 1.3 K/UL (ref 1.1–4.5)
LYMPHOCYTES RELATIVE PERCENT: 31.7 % (ref 20–40)
MCH RBC QN AUTO: 30.1 PG (ref 27–31)
MCHC RBC AUTO-ENTMCNC: 31.1 G/DL (ref 33–37)
MCV RBC AUTO: 96.7 FL (ref 81–99)
MONOCYTES ABSOLUTE: 0.4 K/UL (ref 0–0.9)
MONOCYTES RELATIVE PERCENT: 8.8 % (ref 0–10)
NEUTROPHILS ABSOLUTE: 2.1 K/UL (ref 1.5–7.5)
NEUTROPHILS RELATIVE PERCENT: 51.7 % (ref 50–65)
PDW BLD-RTO: 13.6 % (ref 11.5–14.5)
PERFORMED ON: ABNORMAL
PERFORMED ON: ABNORMAL
PLATELET # BLD: 106 K/UL (ref 130–400)
PMV BLD AUTO: 10.8 FL (ref 9.4–12.3)
POTASSIUM REFLEX MAGNESIUM: 5.2 MMOL/L (ref 3.5–5)
POTASSIUM SERPL-SCNC: 5.2 MMOL/L (ref 3.5–5)
RBC # BLD: 3.29 M/UL (ref 4.2–5.4)
SODIUM BLD-SCNC: 129 MMOL/L (ref 136–145)
TOTAL PROTEIN: 6.2 G/DL (ref 6.6–8.7)
WBC # BLD: 4.1 K/UL (ref 4.8–10.8)

## 2019-03-30 PROCEDURE — 6370000000 HC RX 637 (ALT 250 FOR IP): Performed by: INTERNAL MEDICINE

## 2019-03-30 PROCEDURE — 80053 COMPREHEN METABOLIC PANEL: CPT

## 2019-03-30 PROCEDURE — 36415 COLL VENOUS BLD VENIPUNCTURE: CPT

## 2019-03-30 PROCEDURE — 85025 COMPLETE CBC W/AUTO DIFF WBC: CPT

## 2019-03-30 PROCEDURE — 8010000000 HC HEMODIALYSIS ACUTE INPT

## 2019-03-30 PROCEDURE — 99233 SBSQ HOSP IP/OBS HIGH 50: CPT | Performed by: INTERNAL MEDICINE

## 2019-03-30 PROCEDURE — 2580000003 HC RX 258: Performed by: INTERNAL MEDICINE

## 2019-03-30 PROCEDURE — 6360000002 HC RX W HCPCS: Performed by: INTERNAL MEDICINE

## 2019-03-30 PROCEDURE — 82948 REAGENT STRIP/BLOOD GLUCOSE: CPT

## 2019-03-30 PROCEDURE — 1210000000 HC MED SURG R&B

## 2019-03-30 RX ORDER — CLONIDINE HYDROCHLORIDE 0.1 MG/1
0.2 TABLET ORAL 3 TIMES DAILY
Status: DISCONTINUED | OUTPATIENT
Start: 2019-03-30 | End: 2019-04-02 | Stop reason: HOSPADM

## 2019-03-30 RX ADMIN — HYDRALAZINE HYDROCHLORIDE 10 MG: 20 INJECTION INTRAMUSCULAR; INTRAVENOUS at 11:46

## 2019-03-30 RX ADMIN — INSULIN GLARGINE 10 UNITS: 100 INJECTION, SOLUTION SUBCUTANEOUS at 21:28

## 2019-03-30 RX ADMIN — HYDRALAZINE HYDROCHLORIDE 10 MG: 20 INJECTION INTRAMUSCULAR; INTRAVENOUS at 01:56

## 2019-03-30 RX ADMIN — ROPINIROLE HYDROCHLORIDE 2 MG: 2 TABLET, FILM COATED ORAL at 21:28

## 2019-03-30 RX ADMIN — INSULIN LISPRO 10 UNITS: 100 INJECTION, SOLUTION INTRAVENOUS; SUBCUTANEOUS at 16:46

## 2019-03-30 RX ADMIN — ONDANSETRON 4 MG: 2 INJECTION INTRAMUSCULAR; INTRAVENOUS at 11:46

## 2019-03-30 RX ADMIN — Medication 10 ML: at 11:47

## 2019-03-30 RX ADMIN — OXYCODONE AND ACETAMINOPHEN 1 TABLET: 5; 325 TABLET ORAL at 01:56

## 2019-03-30 RX ADMIN — CLONIDINE HYDROCHLORIDE 0.2 MG: 0.1 TABLET ORAL at 11:46

## 2019-03-30 RX ADMIN — LISINOPRIL 20 MG: 20 TABLET ORAL at 11:47

## 2019-03-30 RX ADMIN — METOPROLOL TARTRATE 100 MG: 50 TABLET, FILM COATED ORAL at 21:28

## 2019-03-30 RX ADMIN — INSULIN LISPRO 3 UNITS: 100 INJECTION, SOLUTION INTRAVENOUS; SUBCUTANEOUS at 16:48

## 2019-03-30 RX ADMIN — METOPROLOL TARTRATE 100 MG: 50 TABLET, FILM COATED ORAL at 11:46

## 2019-03-30 RX ADMIN — ISOSORBIDE MONONITRATE 120 MG: 60 TABLET, EXTENDED RELEASE ORAL at 11:47

## 2019-03-30 RX ADMIN — CLONIDINE HYDROCHLORIDE 0.2 MG: 0.1 TABLET ORAL at 21:28

## 2019-03-30 RX ADMIN — HEPARIN SODIUM 5000 UNITS: 5000 INJECTION INTRAVENOUS; SUBCUTANEOUS at 21:28

## 2019-03-30 RX ADMIN — INSULIN LISPRO 2 UNITS: 100 INJECTION, SOLUTION INTRAVENOUS; SUBCUTANEOUS at 21:29

## 2019-03-30 RX ADMIN — SIMVASTATIN 40 MG: 40 TABLET, FILM COATED ORAL at 21:28

## 2019-03-30 RX ADMIN — NIFEDIPINE 60 MG: 60 TABLET, FILM COATED, EXTENDED RELEASE ORAL at 11:47

## 2019-03-30 RX ADMIN — Medication 10 ML: at 21:29

## 2019-03-30 ASSESSMENT — PAIN DESCRIPTION - PAIN TYPE: TYPE: ACUTE PAIN

## 2019-03-30 ASSESSMENT — PAIN SCALES - GENERAL
PAINLEVEL_OUTOF10: 8
PAINLEVEL_OUTOF10: 3

## 2019-03-30 ASSESSMENT — PAIN DESCRIPTION - PROGRESSION: CLINICAL_PROGRESSION: GRADUALLY IMPROVING

## 2019-03-30 ASSESSMENT — PAIN DESCRIPTION - DESCRIPTORS: DESCRIPTORS: HEADACHE

## 2019-03-30 NOTE — PROGRESS NOTES
tablet 60 mg  60 mg Oral Daily Jeffry Nunez MD   60 mg at 03/29/19 0804    oxyCODONE-acetaminophen (PERCOCET) 5-325 MG per tablet 1 tablet  1 tablet Oral Q6H PRN Jeffry Nunez MD   1 tablet at 03/30/19 0156    heparin (porcine) injection 5,000 Units  5,000 Units Subcutaneous BID Jeffry Nunez MD   5,000 Units at 03/29/19 2158    hydrALAZINE (APRESOLINE) injection 10 mg  10 mg Intravenous Q6H PRN Jeffry Nunez MD   10 mg at 03/30/19 0156    insulin lispro (HUMALOG) injection vial 3 Units  3 Units Subcutaneous TID WC Jeffry Nunez MD   3 Units at 03/29/19 1747    fluticasone (FLONASE) 50 MCG/ACT nasal spray 1 spray  1 spray Each Nare Daily Jeffry Nunez MD   1 spray at 03/29/19 0804    sodium chloride flush 0.9 % injection 10 mL  10 mL Intravenous 2 times per day Jeffry Nunez MD   10 mL at 03/29/19 1906    sodium chloride flush 0.9 % injection 10 mL  10 mL Intravenous PRN Jeffry Nunez MD        magnesium hydroxide (MILK OF MAGNESIA) 400 MG/5ML suspension 30 mL  30 mL Oral Daily PRN Jeffry Nunez MD        ondansetron [facility]) injection 4 mg  4 mg Intravenous Q6H PRN Jeffry Nunez MD   4 mg at 03/29/19 1225    insulin glargine (LANTUS) injection vial 10 Units  10 Units Subcutaneous Nightly Jeffry Nunez MD   10 Units at 03/29/19 2159    isosorbide mononitrate (IMDUR) extended release tablet 120 mg  120 mg Oral Daily Jeffry Nunez MD   120 mg at 03/29/19 0805    levothyroxine (SYNTHROID) tablet 150 mcg  150 mcg Oral Daily Jeffry Nunez MD   150 mcg at 03/29/19 0654    metoprolol tartrate (LOPRESSOR) tablet 100 mg  100 mg Oral BID Jeffry Nunez MD   100 mg at 03/29/19 2154    rOPINIRole (REQUIP) tablet 2 mg  2 mg Oral Nightly Jeffry Nunez MD   2 mg at 03/29/19 2159    simvastatin (ZOCOR) tablet 40 mg  40 mg Oral Nightly Mickeal MD Enrique   40 mg at 03/29/19 2159    glucose (GLUTOSE) 40 % oral gel 15 g  15 g Oral PRN Alonso Wiley Marlo Bailey MD        dextrose 50 % solution 12.5 g  12.5 g Intravenous PRN Raphael Forbes MD        glucagon (rDNA) injection 1 mg  1 mg Intramuscular PRN Raphael Forbes MD        dextrose 5 % solution  100 mL/hr Intravenous PRN Raphael Forbes MD        insulin lispro (HUMALOG) injection vial 0-12 Units  0-12 Units Subcutaneous TID [facility] Raphael Forbes MD   6 Units at 03/29/19 1747    insulin lispro (HUMALOG) injection vial 0-6 Units  0-6 Units Subcutaneous Nightly Raphael Forbes MD   2 Units at 03/29/19 2200    acetaminophen (TYLENOL) tablet 650 mg  650 mg Oral Q6H PRN Raphael Forbes MD   650 mg at 03/28/19 1946    lisinopril (PRINIVIL;ZESTRIL) tablet 20 mg  20 mg Oral Daily Raphael Forbes MD   20 mg at 03/29/19 0805        Labs:     Recent Labs     03/28/19  0125 03/29/19  0419 03/30/19  0350   WBC 4.6* 3.4* 4.1*   RBC 3.30* 3.24* 3.29*   HGB 10.1* 9.7* 9.9*   HCT 31.9* 31.0* 31.8*   MCV 96.7 95.7 96.7   MCH 30.6 29.9 30.1   MCHC 31.7* 31.3* 31.1*   * 102* 106*     Recent Labs     03/27/19  1912 03/28/19  0125  03/29/19  0419 03/30/19  0350   * 133*  --  132* 129*   K 3.1*  --    < > 4.9 5.2*  5.2*   ANIONGAP 13  --   --  12 15   CL 91*  --   --  93* 91*   CO2 28  --   --  27 23   BUN 21*  --   --  27* 47*   CREATININE 3.1*  --   --  4.0* 5.5*   GLUCOSE 225*  --   --  291* 149*   CALCIUM 8.6  --   --  8.7 9.2    < > = values in this interval not displayed. Recent Labs     03/28/19  0652   MG 2.5     Recent Labs     03/27/19  1032 03/29/19  0419 03/30/19  0350   AST 91* 24 24   ALT 67* 31 28   BILITOT 0.6 <0.2 0.3   ALKPHOS 172* 121* 130*     ABGs:No results for input(s): PHART, NHG9TDG, PO2ART, UVN7NGY, BEART, HGBAE, C4SGEUKV, CARBOXHGBART, 02THERAPY in the last 72 hours. HgBA1c: No results for input(s): LABA1C in the last 72 hours.   FLP:    Lab Results   Component Value Date    TRIG 39 03/06/2018    HDL 92 03/06/2018    LDLCALC 59 03/06/2018     TSH:    Lab Results   Component Value Date    TSH 11.250 03/06/2018     Troponin T:   Recent Labs     03/27/19  1032 03/27/19  1514 03/27/19  1808   TROPONINI 0.02 0.03 0.02     INR: No results for input(s): INR in the last 72 hours. Objective:   Vitals: BP (!) 188/80   Pulse 60   Temp 97 °F (36.1 °C) (Temporal)   Resp 16   Ht 5' 6\" (1.676 m)   Wt 156 lb 4 oz (70.9 kg)   SpO2 99%   BMI 25.22 kg/m²   24HR INTAKE/OUTPUT:      Intake/Output Summary (Last 24 hours) at 3/30/2019 0757  Last data filed at 3/29/2019 2179  Gross per 24 hour   Intake 980 ml   Output --   Net 980 ml     General appearance: alert and cooperative with exam, age appropriate  HEENT: atraumatic, eyes with clear conjunctiva and normal lids, pupils and irises normal, external ears and nose are normal, lips normal.  Neck without masses, lympadenopathy, bruit, thyroid normal  Lungs: no increased work of breathing, diminished breath sounds bilaterally  Heart: regular rate and rhythm and S1, S2 normal  Abdomen: soft, non-tender; bowel sounds normal; no masses,  no organomegaly  Extremities: extremities normal, atraumatic, no cyanosis or edema  Neurologic: No focal neurologic deficits, normal sensation, alert and oriented, affect and mood appropriate. Skin: no rashes, nodules.     Assessment and Plan:   # HTN urgency with pulmonary edema  - weaned off cardene  - continue home meds, clonidine increased  - PRN hydralazine  - nephro consult for fluid management  - Strict Is and Os  - check TTE, as below  - trend trop, WNL  - monitor on tele     # Hyponatremia: improved  - monitor serial labs  - nephro consult     # IDDM  - on 15 units levemir and 5 units short acting with meals at home  - Continue 10 units long acting, 3 units premeal and cover on SSI  - monitor requirements and adjust as needed     # hypothyroid  - resume home synthroid     # RLS  - resume home requip    TTE:  Summary   Mitral valve leaflets are mildly thickened with preserved leaflet   mobility.   Trace mitral regurgitation.   Aortic valve leaflets are moderately thickened.   Aortic valve appears to be tricuspid.   Moderate aortic stenosis.   The aortic valve area is 1.61 cm2 with a maximum gradient of 38 mmHg and a   mean gradient of 21 mmHg.   Mild tricuspid regurgitation.   Severely dilated left atrium.   Normal left ventricular size and function.   Mild concentric left ventricular hypertrophy.   Left ventricular ejection fraction is estimated at 57%.   Markedly enlarged right atrium size.   Moderately enlarged right ventricle cavity.  IVC dilated.       Advance Directive: Full Code    DVT prophylaxis: heparin subq    Discharge planning: Possibly home tomorrow if remains afebrile and BP better controlled      Michael Grijalva MD  [facility] Hospitalist

## 2019-03-30 NOTE — PROGRESS NOTES
angioplasty cephalic vein arch [id number] conquest.    VASCULAR SURGERY  2018    FISTULOGRAM    VASCULAR SURGERY  10/29/2018    [facility]. Left upper fistulograms/venograms    VASCULAR SURGERY  2018    [facility]. Arch aortogram,left upper arteriograms.  VASCULAR SURGERY  2019    [facility]. Removal of tunneled dialyis catheter right internal jugular vein.        Family History  Family History   Problem Relation Age of Onset    Colon Cancer Mother          at 63    Colon Polyps Mother     Lung Cancer Father          at 66    Colon Polyps Father     Stomach Cancer Sister     Breast Cancer Sister     Depression Daughter 25        committed suicide    Liver Cancer Neg Hx     Liver Disease Neg Hx     Esophageal Cancer Neg Hx     Rectal Cancer Neg Hx        Social History  Social History     Socioeconomic History    Marital status:      Spouse name: Not on file    Number of children: 2    Years of education: Not on file    Highest education level: Not on file   Occupational History    Not on file   Social Needs    Financial resource strain: Not on file    Food insecurity:     Worry: Not on file     Inability: Not on file    Transportation needs:     Medical: Not on file     Non-medical: Not on file   Tobacco Use    Smoking status: Current Every Day Smoker     Packs/day: 0.50     Years: 25.00     Pack years: 12.50     Types: Cigarettes     Last attempt to quit: 2017     Years since quittin.8    Smokeless tobacco: Never Used   Substance and Sexual Activity    Alcohol use: No    Drug use: No    Sexual activity: Not Currently     Partners: Male   Lifestyle    Physical activity:     Days per week: Not on file     Minutes per session: Not on file    Stress: Not on file   Relationships    Social connections:     Talks on phone: Not on file     Gets together: Not on file     Attends Shinto service: Not on file     Active member of club or organization: Not on file     Attends --  93* 91*   CO2 28  --   --  27 23   BUN 21*  --   --  27* 47*   CREATININE 3.1*  --   --  4.0* 5.5*   CALCIUM 8.6  --   --  8.7 9.2    < > = values in this interval not displayed. CBC:   Recent Labs     03/28/19  0125 03/29/19  0419 03/30/19  0350   WBC 4.6* 3.4* 4.1*   HGB 10.1* 9.7* 9.9*   HCT 31.9* 31.0* 31.8*   MCV 96.7 95.7 96.7   * 102* 106*     LIVER PROFILE:   Recent Labs     03/27/19  1032 03/29/19  0419 03/30/19  0350   AST 91* 24 24   ALT 67* 31 28   LIPASE 65*  --   --    BILITOT 0.6 <0.2 0.3   ALKPHOS 172* 121* 130*     PT/INR: No results for input(s): PROTIME, INR in the last 72 hours. APTT: No results for input(s): APTT in the last 72 hours. BNP:  No results for input(s): BNP in the last 72 hours. Ionized Calcium:No results for input(s): IONCA in the last 72 hours. Magnesium:  Recent Labs     03/28/19  0652   MG 2.5     Phosphorus:No results for input(s): PHOS in the last 72 hours. HgbA1C: No results for input(s): LABA1C in the last 72 hours. Hepatic:   Recent Labs     03/27/19  1032 03/29/19  0419 03/30/19  0350   ALKPHOS 172* 121* 130*   ALT 67* 31 28   AST 91* 24 24   PROT 7.5 5.9* 6.2*   BILITOT 0.6 <0.2 0.3   LABALBU 4.2 3.3* 3.4*     Lactic Acid:   Recent Labs     03/28/19  0125   LACTA 1.1     Troponin: No results for input(s): CKTOTAL, CKMB, TROPONINT in the last 72 hours. ABGs:   Lab Results   Component Value Date    PHART 7.200 03/05/2018    PO2ART 68.0 03/05/2018    YER1KIE 60.0 03/05/2018     CRP:  No results for input(s): CRP in the last 72 hours. Sed Rate:  No results for input(s): SEDRATE in the last 72 hours. Culture Results:   Blood Culture Recent: No results for input(s): BC in the last 720 hours. Urine Culture Recent : No results for input(s): LABURIN in the last 720 hours. Radiology reports as per the Radiologist  Radiology: Xr Chest Standard (2 Vw)    Result Date: 3/27/2019  History: 79-year-old with dyspnea.  Reference: Chest radiograph March 24, 2019. Findings: Frontal and lateral chest radiographs performed. Heart mildly enlarged. From 3 days ago there is new interstitial edema predominantly at the bases. No significant pleural effusion. No pneumothorax. Left subclavian vascular stent. No acute osseous abnormalities. Development of interstitial pulmonary edema compared to 3 days prior. Signed by Dr Yany Champion on 3/27/2019 12:08 PM    Xr Chest Portable    Result Date: 3/24/2019  History: 39-year-old with shortness of breath. Reference: Chest radiograph January 2019. Findings: Frontal chest radiograph performed. Mild cardiomegaly. The lungs are clear. I suspect tiny left pleural effusion. There is a left subclavian vascular stent. No acute osseous findings. Suspect tiny left pleural effusion. No overt edema/overload. Signed by Dr Yany Champion on 3/24/2019 7:51 PM    Cta Pulmonary W Contrast    Result Date: 3/27/2019  CTA PULMONARY W CONTRAST 3/27/2019 10:45 AM HISTORY: Dyspnea, clinical concerns for pulmonary embolus Comparison: 2/11/2019 chest CT Technique: CT evaluation of the chest was performed with intravenous contrast. 2 mm transaxial images were obtained. Maximum intensity projection reconstruction angiographic images were generated . Two-dimensional Coronal and sagittal reconstruction images of the pulmonary arteries, aorta and great vessels were also generated. .Radiation dose equals  mGy cm. AUTOMATED EXPOSURE CONTROL dose reduction technique was implemented Findings: There are small to moderate-sized bilateral pleural effusions, layering posteriorly. There is reticular nodular interstitial changes diffusely in the lungs with peribronchial cuffing. There are patchy groundglass opacities in the upper lobes with more confluent groundglass opacities in the lower lobes. There is cardiomegaly with panchamber enlargement. There is diffuse body wall edema with anasarca change.  Differential considerations include pulmonary edema/congestive heart failure-volume overload. The mediastinum appears edematous with diffuse low-attenuation changes without definite lymphadenopathy. Peribronchial thickening noted in the hilar regions without significant lymph node enlargement. Calcified left hilar lymph nodes appreciated. There is mild reflux of contrast material into the inferior vena cava and hepatic veins suggesting elevated right heart pressures. The pulmonary arteries opacify without iodinated contrast without intraluminal filling defects or CT angiographic evidence of pulmonary embolus. Aorta is normal in caliber, without aneurysm or dissection. Left-sided subclavian venous stent is observed. Limited imaging of the upper abdomen is unremarkable. No acute osseous abnormalities identified. Impression: 1. No CT angiographic evidence of pulmonary embolus or acute aortic pathology. 2. Small to moderate-sized pleural effusions with reticular interstitial/peribronchial thickening with diffuse patchy groundglass opacities. Cardiomegaly with subcutaneous edema/anasarca change. This finding suggest pulmonary edema, congestive heart failure/volume overload, correlate with patient presentation.  Signed by Dr Lourdes Monk on 3/27/2019 1:27 PM       Assessment   ESRD  DM2 with nephropathy on insulin  HTN  Hyponatremia  Secondary Hyperparathyroidism  Anemia CKD      Plan:  HD TTS  Fluid counseling   bp meds adjusted    Amy Hartley MD  03/30/19  9:55 AM

## 2019-03-31 LAB
ANION GAP SERPL CALCULATED.3IONS-SCNC: 15 MMOL/L (ref 7–19)
BASOPHILS ABSOLUTE: 0.1 K/UL (ref 0–0.2)
BASOPHILS RELATIVE PERCENT: 2.3 % (ref 0–1)
BUN BLDV-MCNC: 32 MG/DL (ref 6–20)
CALCIUM SERPL-MCNC: 8.9 MG/DL (ref 8.6–10)
CHLORIDE BLD-SCNC: 92 MMOL/L (ref 98–111)
CO2: 24 MMOL/L (ref 22–29)
CREAT SERPL-MCNC: 4.6 MG/DL (ref 0.5–0.9)
EOSINOPHILS ABSOLUTE: 0.2 K/UL (ref 0–0.6)
EOSINOPHILS RELATIVE PERCENT: 5.4 % (ref 0–5)
GFR NON-AFRICAN AMERICAN: 10
GLUCOSE BLD-MCNC: 171 MG/DL (ref 70–99)
GLUCOSE BLD-MCNC: 178 MG/DL (ref 70–99)
GLUCOSE BLD-MCNC: 256 MG/DL (ref 70–99)
GLUCOSE BLD-MCNC: 297 MG/DL (ref 70–99)
GLUCOSE BLD-MCNC: 340 MG/DL (ref 74–109)
GLUCOSE BLD-MCNC: 344 MG/DL (ref 70–99)
HCT VFR BLD CALC: 31.3 % (ref 37–47)
HEMOGLOBIN: 9.8 G/DL (ref 12–16)
LYMPHOCYTES ABSOLUTE: 1.4 K/UL (ref 1.1–4.5)
LYMPHOCYTES RELATIVE PERCENT: 35 % (ref 20–40)
MCH RBC QN AUTO: 30.2 PG (ref 27–31)
MCHC RBC AUTO-ENTMCNC: 31.3 G/DL (ref 33–37)
MCV RBC AUTO: 96.6 FL (ref 81–99)
MONOCYTES ABSOLUTE: 0.4 K/UL (ref 0–0.9)
MONOCYTES RELATIVE PERCENT: 10.6 % (ref 0–10)
NEUTROPHILS ABSOLUTE: 1.8 K/UL (ref 1.5–7.5)
NEUTROPHILS RELATIVE PERCENT: 46.7 % (ref 50–65)
PDW BLD-RTO: 13.5 % (ref 11.5–14.5)
PERFORMED ON: ABNORMAL
PLATELET # BLD: 114 K/UL (ref 130–400)
PMV BLD AUTO: 10.8 FL (ref 9.4–12.3)
POTASSIUM REFLEX MAGNESIUM: 5.1 MMOL/L (ref 3.5–5)
RBC # BLD: 3.24 M/UL (ref 4.2–5.4)
SODIUM BLD-SCNC: 131 MMOL/L (ref 136–145)
WBC # BLD: 3.9 K/UL (ref 4.8–10.8)

## 2019-03-31 PROCEDURE — 85025 COMPLETE CBC W/AUTO DIFF WBC: CPT

## 2019-03-31 PROCEDURE — 99233 SBSQ HOSP IP/OBS HIGH 50: CPT | Performed by: INTERNAL MEDICINE

## 2019-03-31 PROCEDURE — 6370000000 HC RX 637 (ALT 250 FOR IP): Performed by: INTERNAL MEDICINE

## 2019-03-31 PROCEDURE — 36415 COLL VENOUS BLD VENIPUNCTURE: CPT

## 2019-03-31 PROCEDURE — 82948 REAGENT STRIP/BLOOD GLUCOSE: CPT

## 2019-03-31 PROCEDURE — 1210000000 HC MED SURG R&B

## 2019-03-31 PROCEDURE — 80048 BASIC METABOLIC PNL TOTAL CA: CPT

## 2019-03-31 PROCEDURE — 2580000003 HC RX 258: Performed by: INTERNAL MEDICINE

## 2019-03-31 PROCEDURE — 6360000002 HC RX W HCPCS: Performed by: INTERNAL MEDICINE

## 2019-03-31 RX ORDER — HYDRALAZINE HYDROCHLORIDE 50 MG/1
50 TABLET, FILM COATED ORAL EVERY 8 HOURS SCHEDULED
Status: DISCONTINUED | OUTPATIENT
Start: 2019-03-31 | End: 2019-04-02 | Stop reason: HOSPADM

## 2019-03-31 RX ADMIN — ONDANSETRON 4 MG: 2 INJECTION INTRAMUSCULAR; INTRAVENOUS at 03:47

## 2019-03-31 RX ADMIN — FLUTICASONE PROPIONATE 1 SPRAY: 50 SPRAY, METERED NASAL at 09:31

## 2019-03-31 RX ADMIN — CLONIDINE HYDROCHLORIDE 0.2 MG: 0.1 TABLET ORAL at 14:36

## 2019-03-31 RX ADMIN — Medication 10 ML: at 09:31

## 2019-03-31 RX ADMIN — INSULIN LISPRO 3 UNITS: 100 INJECTION, SOLUTION INTRAVENOUS; SUBCUTANEOUS at 21:51

## 2019-03-31 RX ADMIN — Medication 10 ML: at 21:53

## 2019-03-31 RX ADMIN — INSULIN LISPRO 2 UNITS: 100 INJECTION, SOLUTION INTRAVENOUS; SUBCUTANEOUS at 12:03

## 2019-03-31 RX ADMIN — MAGNESIUM HYDROXIDE 30 ML: 400 SUSPENSION ORAL at 22:06

## 2019-03-31 RX ADMIN — INSULIN LISPRO 3 UNITS: 100 INJECTION, SOLUTION INTRAVENOUS; SUBCUTANEOUS at 09:36

## 2019-03-31 RX ADMIN — NIFEDIPINE 60 MG: 60 TABLET, FILM COATED, EXTENDED RELEASE ORAL at 09:30

## 2019-03-31 RX ADMIN — INSULIN LISPRO 2 UNITS: 100 INJECTION, SOLUTION INTRAVENOUS; SUBCUTANEOUS at 17:34

## 2019-03-31 RX ADMIN — METOPROLOL TARTRATE 100 MG: 50 TABLET, FILM COATED ORAL at 09:30

## 2019-03-31 RX ADMIN — INSULIN LISPRO 8 UNITS: 100 INJECTION, SOLUTION INTRAVENOUS; SUBCUTANEOUS at 09:32

## 2019-03-31 RX ADMIN — HEPARIN SODIUM 5000 UNITS: 5000 INJECTION INTRAVENOUS; SUBCUTANEOUS at 21:51

## 2019-03-31 RX ADMIN — CLONIDINE HYDROCHLORIDE 0.2 MG: 0.1 TABLET ORAL at 09:30

## 2019-03-31 RX ADMIN — LEVOTHYROXINE SODIUM 150 MCG: 75 TABLET ORAL at 09:37

## 2019-03-31 RX ADMIN — LISINOPRIL 20 MG: 20 TABLET ORAL at 09:30

## 2019-03-31 RX ADMIN — ROPINIROLE HYDROCHLORIDE 2 MG: 2 TABLET, FILM COATED ORAL at 21:52

## 2019-03-31 RX ADMIN — INSULIN LISPRO 3 UNITS: 100 INJECTION, SOLUTION INTRAVENOUS; SUBCUTANEOUS at 12:03

## 2019-03-31 RX ADMIN — HYDRALAZINE HYDROCHLORIDE 50 MG: 50 TABLET, FILM COATED ORAL at 21:53

## 2019-03-31 RX ADMIN — HYDRALAZINE HYDROCHLORIDE 10 MG: 20 INJECTION INTRAMUSCULAR; INTRAVENOUS at 11:16

## 2019-03-31 RX ADMIN — SIMVASTATIN 40 MG: 40 TABLET, FILM COATED ORAL at 21:53

## 2019-03-31 RX ADMIN — HEPARIN SODIUM 5000 UNITS: 5000 INJECTION INTRAVENOUS; SUBCUTANEOUS at 09:31

## 2019-03-31 RX ADMIN — HYDRALAZINE HYDROCHLORIDE 50 MG: 50 TABLET, FILM COATED ORAL at 14:36

## 2019-03-31 RX ADMIN — INSULIN GLARGINE 10 UNITS: 100 INJECTION, SOLUTION SUBCUTANEOUS at 21:51

## 2019-03-31 RX ADMIN — ISOSORBIDE MONONITRATE 120 MG: 60 TABLET, EXTENDED RELEASE ORAL at 09:30

## 2019-03-31 RX ADMIN — CLONIDINE HYDROCHLORIDE 0.2 MG: 0.1 TABLET ORAL at 21:53

## 2019-03-31 RX ADMIN — METOPROLOL TARTRATE 100 MG: 50 TABLET, FILM COATED ORAL at 21:52

## 2019-03-31 RX ADMIN — INSULIN LISPRO 3 UNITS: 100 INJECTION, SOLUTION INTRAVENOUS; SUBCUTANEOUS at 17:35

## 2019-03-31 ASSESSMENT — PAIN SCALES - GENERAL
PAINLEVEL_OUTOF10: 0
PAINLEVEL_OUTOF10: 0

## 2019-03-31 ASSESSMENT — PAIN DESCRIPTION - PROGRESSION: CLINICAL_PROGRESSION: GRADUALLY IMPROVING

## 2019-03-31 NOTE — PROGRESS NOTES
Nephrology ([address] [facility]) Progress Note    Patient:  Celestino Lai  YOB: 1970  Date of Service: 3/31/2019  MRN: [id number]   Acct: [de-identified]   Primary Care Physician: ROMINA Huerta  Advance Directive: Full Code  Admit Date: 3/27/2019       Hospital Day: 4  Referring Provider: Nati Hay DO    Patient independently seen and examined, Chart, Consults, Notes, Operative notes, Labs, Cardiology, and Radiology studies reviewed as able. Subjective:  Celestino Lai is a 47 y.o. female  whom we were consulted for end-stage renal disease. She has end-stage renal disease secondary to diabetic nephropathy and goes to the [facility] on Tuesday, Thursday, and Saturday. She presented  with severe shortness of breath, pulmonary edema, and severe hypertension. Patient was admitted to ICU needing emergent dialysis for correction of pulmonary edema. She was also treated with intravenous Cardene which has been weaned off. She was moved from ICU to the regular floor.      Today, breathing much better. Tolerating HD so far. No n/v. Asking for PT. Allergies:  Penicillins;  Lamisil advanced [terbinafine]; and Stadol [butorphanol]    Medicines:  Current Facility-Administered Medications   Medication Dose Route Frequency Provider Last Rate Last Dose    hydrALAZINE (APRESOLINE) tablet 50 mg  50 mg Oral 3 times per day Nati Hay DO        cloNIDine (CATAPRES) tablet 0.2 mg  0.2 mg Oral TID Deyanira Glass MD   0.2 mg at 03/31/19 0930    NIFEdipine (PROCARDIA XL) extended release tablet 60 mg  60 mg Oral Daily Deyanira Glass MD   60 mg at 03/31/19 0930    oxyCODONE-acetaminophen (PERCOCET) 5-325 MG per tablet 1 tablet  1 tablet Oral Q6H PRN Deyanira Glass MD   1 tablet at 03/30/19 0156    heparin (porcine) injection 5,000 Units  5,000 Units Subcutaneous BID Deyanira Glass MD   5,000 Units at 03/31/19 0931    hydrALAZINE (APRESOLINE) 8 Units at 03/31/19 0932    insulin lispro (HUMALOG) injection vial 0-6 Units  0-6 Units Subcutaneous Nightly Minh Alvarez MD   2 Units at 03/30/19 2129    acetaminophen (TYLENOL) tablet 650 mg  650 mg Oral Q6H PRN Minh Alvarez MD   650 mg at 03/28/19 1946    lisinopril (PRINIVIL;ZESTRIL) tablet 20 mg  20 mg Oral Daily Minh Alvarez MD   20 mg at 03/31/19 0930       Past Medical History:  Past Medical History:   Diagnosis Date    Arthritis     Chronic kidney disease, stage 5, kidney failure [facility] [address]     DM (diabetes mellitus) type II controlled with renal manifestation ([facility] [address] 06/1993    Gastroparesis     GERD (gastroesophageal reflux disease)     Hemodialysis patient ([facility] [address]     dialysis on tues, thur, and sat at [facility]    Hypertension     Hypothyroid     Nasal congestion     recent    Restless leg syndrome        Past Surgical History:  Past Surgical History:   Procedure Laterality Date    BREAST SURGERY      infected milk gland removed    CHOLECYSTECTOMY      COLONOSCOPY      DIALYSIS FISTULA CREATION Left 1/25/2019    REVISION LEFT UPPER EXTREMITY AV ACCESS WITH LEFT BRACHIAL ARTERY TO LEFT AXILLARY VEIN WITH  INTERPOSITIONAL ARTEGRAFT   performed by Celeste Parks MD at [address]  2012    GALLBLADDER SURGERY      HC DIALYSIS CATHETER Right 1/25/2019    INSERTION OF RIGHT INTERNAL JUGULAR HEMODIALYSIS CATHETER performed by Celeste [facility] at [facility] denies hyst; states ablation    RI COLONOSCOPY W/BIOPSY SINGLE/MULTIPLE N/A 10/20/2017    Dr Cynthia Oliveira prep-Tubular AP (-) dysplasia x 2--3 yr recall    RI EGD TRANSORAL BIOPSY SINGLE/MULTIPLE N/A 12/27/2016    Dr Magdi Asencio EGD TRANSORAL BIOPSY SINGLE/MULTIPLE N/A 10/20/2017    Dr Ritchie Barrera (-)    TUBAL LIGATION      VASCULAR SURGERY Left 2016    fistula by Dr Zakiya Patel at [address]  10/02/2017    [facility]. Left upper fistulograms/venograms, balloon angioplasty cephalic vein arch 14I21 conquest.    VASCULAR SURGERY  2018    FISTULOGRAM    VASCULAR SURGERY  10/29/2018    [facility]. Left upper fistulograms/venograms    VASCULAR SURGERY  2018    [facility]. Arch aortogram,left upper arteriograms.  VASCULAR SURGERY  2019    [facility]. Removal of tunneled dialyis catheter right internal jugular vein.        Family History  Family History   Problem Relation Age of Onset    Colon Cancer Mother          at 63    Colon Polyps Mother     Lung Cancer Father          at 66    Colon Polyps Father     Stomach Cancer Sister     Breast Cancer Sister     Depression Daughter 25        committed suicide    Liver Cancer Neg Hx     Liver Disease Neg Hx     Esophageal Cancer Neg Hx     Rectal Cancer Neg Hx        Social History  Social History     Socioeconomic History    Marital status:      Spouse name: Not on file    Number of children: 2    Years of education: Not on file    Highest education level: Not on file   Occupational History    Not on file   Social Needs    Financial resource strain: Not on file    Food insecurity:     Worry: Not on file     Inability: Not on file    Transportation needs:     Medical: Not on file     Non-medical: Not on file   Tobacco Use    Smoking status: Current Every Day Smoker     Packs/day: 0.50     Years: 25.00     Pack years: 12.50     Types: Cigarettes     Last attempt to quit: 2017     Years since quittin.8    Smokeless tobacco: Never Used   Substance and Sexual Activity    Alcohol use: No    Drug use: No    Sexual activity: Not Currently     Partners: Male   Lifestyle    Physical activity:     Days per week: Not on file     Minutes per session: Not on file    Stress: Not on file   Relationships    Social connections:     Talks on phone: Not on file     Gets together: Not on file     Attends [employer] service: Not on file     Active member of club or organization: Not on file     Attends meetings of clubs or organizations: Not on file     Relationship status: Not on file    Intimate partner violence:     Fear of current or ex partner: Not on file     Emotionally abused: Not on file     Physically abused: Not on file     Forced sexual activity: Not on file   Other Topics Concern    Not on file   Social History Narrative    Not on file         Review of Systems:  History obtained from chart review and the patient  General ROS: No fever or chills  Respiratory ROS: No cough, shortness of breath, wheezing  Cardiovascular ROS: No chest pain or palpitations  Gastrointestinal ROS: No abdominal pain or melena  Genito-Urinary ROS: No dysuria or hematuria  Musculoskeletal ROS: No joint pain or swelling         Objective:  Patient Vitals for the past 24 hrs:   BP Temp Temp src Pulse Resp SpO2 Weight   03/31/19 1155 139/62 99 °F (37.2 °C) -- 60 16 99 % --   03/31/19 1039 (!) 180/72 -- -- -- -- -- --   03/31/19 0707 (!) 173/71 98.2 °F (36.8 °C) Temporal 53 17 98 % --   03/31/19 0538 -- -- -- -- -- -- 150 lb 9 oz (68.3 kg)   03/30/19 1920 136/70 97.6 °F (36.4 °C) Temporal 59 14 99 % --       Intake/Output Summary (Last 24 hours) at 3/31/2019 1157  Last data filed at 3/31/2019 0939  Gross per 24 hour   Intake 480 ml   Output 75 ml   Net 405 ml     General: awake/alert   Chest:  clear to auscultation bilaterally without respiratory distress  CVS: regular rate and rhythm  Abdominal: soft, nontender, normal bowel sounds  Extremities: no cyanosis or edema  Skin: warm and dry without rash    Labs:  BMP:   Recent Labs     03/29/19 0419 03/30/19  0350 03/31/19  0327   * 129* 131*   K 4.9 5.2*  5.2* 5.1*   CL 93* 91* 92*   CO2 27 23 24   BUN 27* 47* 32*   CREATININE 4.0* 5.5* 4.6*   CALCIUM 8.7 9.2 8.9     CBC:   Recent Labs     03/29/19 0419 03/30/19  0350 03/31/19  0327   WBC 3.4* 4.1* 3.9*   HGB 9.7* 9.9* 9.8*   HCT 31.0* 31.8* 31.3*   MCV 95.7 96.7 96.6   * 106* 114*     LIVER PROFILE:   Recent Labs 03/29/19 0419 03/30/19 0350   AST 24 24   ALT 31 28   BILITOT <0.2 0.3   ALKPHOS 121* 130*     PT/INR: No results for input(s): PROTIME, INR in the last 72 hours. APTT: No results for input(s): APTT in the last 72 hours. BNP:  No results for input(s): BNP in the last 72 hours. Ionized Calcium:No results for input(s): IONCA in the last 72 hours. Magnesium:  No results for input(s): MG in the last 72 hours. Phosphorus:No results for input(s): PHOS in the last 72 hours. HgbA1C: No results for input(s): LABA1C in the last 72 hours. Hepatic:   Recent Labs     03/29/19 0419 03/30/19 0350   ALKPHOS 121* 130*   ALT 31 28   AST 24 24   PROT 5.9* 6.2*   BILITOT <0.2 0.3   LABALBU 3.3* 3.4*     Lactic Acid:   No results for input(s): LACTA in the last 72 hours. Troponin: No results for input(s): CKTOTAL, CKMB, TROPONINT in the last 72 hours. ABGs:   Lab Results   Component Value Date    PHART 7.200 03/05/2018    PO2ART 68.0 03/05/2018    FLF3BHH 60.0 03/05/2018     CRP:  No results for input(s): CRP in the last 72 hours. Sed Rate:  No results for input(s): SEDRATE in the last 72 hours. Culture Results:   Blood Culture Recent: No results for input(s): BC in the last 720 hours. Urine Culture Recent : No results for input(s): LABURIN in the last 720 hours. Radiology reports as per the Radiologist  Radiology: Xr Chest Standard (2 Vw)    Result Date: 3/27/2019  History: 58-year-old with dyspnea. Reference: Chest radiograph March 24, 2019. Findings: Frontal and lateral chest radiographs performed. Heart mildly enlarged. From 3 days ago there is new interstitial edema predominantly at the bases. No significant pleural effusion. No pneumothorax. Left subclavian vascular stent. No acute osseous abnormalities. Development of interstitial pulmonary edema compared to 3 days prior.  Signed by Dr Maurice Shepherd on 3/27/2019 12:08 PM    Xr Chest Portable    Result Date: 3/24/2019  History: 58-year-old with shortness of breath. Reference: Chest radiograph January 2019. Findings: Frontal chest radiograph performed. Mild cardiomegaly. The lungs are clear. I suspect tiny left pleural effusion. There is a left subclavian vascular stent. No acute osseous findings. Suspect tiny left pleural effusion. No overt edema/overload. Signed by Dr Paulette Hodges on 3/24/2019 7:51 PM    Cta Pulmonary W Contrast    Result Date: 3/27/2019  CTA PULMONARY W CONTRAST 3/27/2019 10:45 AM HISTORY: Dyspnea, clinical concerns for pulmonary embolus Comparison: 2/11/2019 chest CT Technique: CT evaluation of the chest was performed with intravenous contrast. 2 mm transaxial images were obtained. Maximum intensity projection reconstruction angiographic images were generated . Two-dimensional Coronal and sagittal reconstruction images of the pulmonary arteries, aorta and great vessels were also generated. .Radiation dose equals  mGy cm. AUTOMATED EXPOSURE CONTROL dose reduction technique was implemented Findings: There are small to moderate-sized bilateral pleural effusions, layering posteriorly. There is reticular nodular interstitial changes diffusely in the lungs with peribronchial cuffing. There are patchy groundglass opacities in the upper lobes with more confluent groundglass opacities in the lower lobes. There is cardiomegaly with panchamber enlargement. There is diffuse body wall edema with anasarca change. Differential considerations include pulmonary edema/congestive heart failure-volume overload. The mediastinum appears edematous with diffuse low-attenuation changes without definite lymphadenopathy. Peribronchial thickening noted in the hilar regions without significant lymph node enlargement. Calcified left hilar lymph nodes appreciated. There is mild reflux of contrast material into the inferior vena cava and hepatic veins suggesting elevated right heart pressures.  The pulmonary arteries opacify without iodinated contrast without intraluminal filling defects or CT angiographic evidence of pulmonary embolus. Aorta is normal in caliber, without aneurysm or dissection. Left-sided subclavian venous stent is observed. Limited imaging of the upper abdomen is unremarkable. No acute osseous abnormalities identified. Impression: 1. No CT angiographic evidence of pulmonary embolus or acute aortic pathology. 2. Small to moderate-sized pleural effusions with reticular interstitial/peribronchial thickening with diffuse patchy groundglass opacities. Cardiomegaly with subcutaneous edema/anasarca change. This finding suggest pulmonary edema, congestive heart failure/volume overload, correlate with patient presentation.  Signed by Dr Odilia Junior on 3/27/2019 1:27 PM       Assessment   ESRD  DM2 with nephropathy on insulin  HTN  Hyponatremia  Secondary Hyperparathyroidism  Anemia CKD      Plan:  HD TTS  Fluid counseling   bp meds adjusted again  D/w RN/IM    Maddie Croft MD  03/31/19  11:57 AM

## 2019-03-31 NOTE — PROGRESS NOTES
Progress Note  3/31/2019 10:41 AM  Subjective:   Admit Date: 3/27/2019  PCP: ROMINA Wright    CC: sob    Subjective: pt seen and examined, says overall she feels better but it still hard for her to breathe at times. Denies any chest pain. No n/v.     ROS: 14 point review of systems is negative except as specifically addressed above.     DIET CARDIAC; Carb Control: 4 carb choices (60 gms)/meal; No Caffeine, Renal    Intake/Output Summary (Last 24 hours) at 3/31/2019 1041  Last data filed at 3/31/2019 6658  Gross per 24 hour   Intake 480 ml   Output 75 ml   Net 405 ml     Medications:   dextrose       Current Facility-Administered Medications   Medication Dose Route Frequency Provider Last Rate Last Dose    hydrALAZINE (APRESOLINE) tablet 50 mg  50 mg Oral 3 times per day Nba Kyle DO        cloNIDine (CATAPRES) tablet 0.2 mg  0.2 mg Oral TID Stephany Hatfield MD   0.2 mg at 03/31/19 0930    NIFEdipine (PROCARDIA XL) extended release tablet 60 mg  60 mg Oral Daily Stephany Hatfield MD   60 mg at 03/31/19 0930    oxyCODONE-acetaminophen (PERCOCET) 5-325 MG per tablet 1 tablet  1 tablet Oral Q6H PRN Stephany Hatfield MD   1 tablet at 03/30/19 0156    heparin (porcine) injection 5,000 Units  5,000 Units Subcutaneous BID Stephany Hatfield MD   5,000 Units at 03/31/19 0931    hydrALAZINE (APRESOLINE) injection 10 mg  10 mg Intravenous Q6H PRN Stephany Hatfield MD   10 mg at 03/30/19 1146    insulin lispro (HUMALOG) injection vial 3 Units  3 Units Subcutaneous TID WC Stephany Hatfield MD   3 Units at 03/31/19 0936    fluticasone (FLONASE) 50 MCG/ACT nasal spray 1 spray  1 spray Each Nare Daily Stephany Hatfield MD   1 spray at 03/31/19 0931    sodium chloride flush 0.9 % injection 10 mL  10 mL Intravenous 2 times per day Stephany Hatfield MD   10 mL at 03/31/19 0931    sodium chloride flush 0.9 % injection 10 mL  10 mL Intravenous PRN Stephany Hatfield MD        magnesium hydroxide (MILK OF MAGNESIA) 400 MG/5ML suspension 30 mL  30 mL Oral Daily PRN Mago Vallejo MD        [facility]dansetron [facility] injection 4 mg  4 mg Intravenous Q6H PRN Mago Vallejo MD   4 mg at 03/31/19 0347    insulin glargine (LANTUS) injection vial 10 Units  10 Units Subcutaneous Nightly Mago Vallejo MD   10 Units at 03/30/19 2128    isosorbide mononitrate (IMDUR) extended release tablet 120 mg  120 mg Oral Daily Mago Vallejo MD   120 mg at 03/31/19 0930    levothyroxine (SYNTHROID) tablet 150 mcg  150 mcg Oral Daily Mago Vallejo MD   150 mcg at 03/31/19 7310    metoprolol tartrate (LOPRESSOR) tablet 100 mg  100 mg Oral BID Mago Vallejo MD   100 mg at 03/31/19 0930    rOPINIRole (REQUIP) tablet 2 mg  2 mg Oral Nightly Mago Vallejo MD   2 mg at 03/30/19 2128    simvastatin (ZOCOR) tablet 40 mg  40 mg Oral Nightly Mago Vallejo MD   40 mg at 03/30/19 2128    glucose (GLUTOSE) 40 % oral gel 15 g  15 g Oral PRN Mago Vallejo MD        dextrose 50 % solution 12.5 g  12.5 g Intravenous PRN Mago Vallejo MD        glucagon (rDNA) injection 1 mg  1 mg Intramuscular PRN Mago Vallejo MD        dextrose 5 % solution  100 mL/hr Intravenous PRN Mago Vallejo MD        insulin lispro (HUMALOG) injection vial 0-12 Units  0-12 Units Subcutaneous TID [facility] Mago Vallejo MD   8 Units at 03/31/19 0932    insulin lispro (HUMALOG) injection vial 0-6 Units  0-6 Units Subcutaneous Nightly Mago Vallejo MD   2 Units at 03/30/19 2129    acetaminophen (TYLENOL) tablet 650 mg  650 mg Oral Q6H PRN Mago Vallejo MD   650 mg at 03/28/19 1946    lisinopril (PRINIVIL;ZESTRIL) tablet 20 mg  20 mg Oral Daily Mago Vallejo MD   20 mg at 03/31/19 0930        Labs:     Recent Labs     03/29/19  0419 03/30/19  0350 03/31/19  0327   WBC 3.4* 4.1* 3.9*   RBC 3.24* 3.29* 3.24*   HGB 9.7* 9.9* 9.8*   HCT 31.0* 31.8* 31.3*   MCV 95.7 96.7 96.6   MCH 29.9 30.1 30.2   MCHC 31.3* 31.1* 31.3*   * 106* 114*     Recent Labs     03/29/19 0419 03/30/19  0350 03/31/19  0327   * 129* 131*   K 4.9 5.2*  5.2* 5.1*   ANIONGAP 12 15 15   CL 93* 91* 92*   CO2 27 23 24   BUN 27* 47* 32*   CREATININE 4.0* 5.5* 4.6*   GLUCOSE 291* 149* 340*   CALCIUM 8.7 9.2 8.9     No results for input(s): MG, PHOS in the last 72 hours. Recent Labs     03/29/19 0419 03/30/19  0350   AST 24 24   ALT 31 28   BILITOT <0.2 0.3   ALKPHOS 121* 130*     Troponin T:   No results for input(s): TROPONINI in the last 72 hours. CTA PULMONARY W CONTRAST [[id number]] Resulted: 03/27/19 1327      Order Status: Completed Updated: 03/27/19 1329     Narrative:       CTA PULMONARY W CONTRAST 3/27/2019 10:45 AM  HISTORY: Dyspnea, clinical concerns for pulmonary embolus  Comparison: 2/11/2019 chest CT  Technique:  CT evaluation of the chest was performed with intravenous contrast. 2  mm transaxial images were obtained. Maximum intensity projection  reconstruction angiographic images were generated . Two-dimensional  Coronal and sagittal reconstruction images of the pulmonary arteries,  aorta and great vessels were also generated. .Radiation dose equals  mGy cm. AUTOMATED EXPOSURE CONTROL dose  reduction technique was implemented  Findings: There are small to moderate-sized bilateral pleural effusions,  layering posteriorly. There is reticular nodular interstitial changes diffusely in the lungs  with peribronchial cuffing. There are patchy groundglass opacities in  the upper lobes with more confluent groundglass opacities in the lower  lobes. There is cardiomegaly with panchamber enlargement. There is  diffuse body wall edema with anasarca change. Differential  considerations include pulmonary edema/congestive heart failure-volume  overload. The mediastinum appears edematous with diffuse low-attenuation changes  without definite lymphadenopathy.  Peribronchial thickening noted in  the hilar regions without significant lymph node enlargement. Calcified left hilar lymph nodes appreciated. There is mild reflux of contrast material into the inferior vena cava  and hepatic veins suggesting elevated right heart pressures. The pulmonary arteries opacify without iodinated contrast without  intraluminal filling defects or CT angiographic evidence of pulmonary  embolus. Aorta is normal in caliber, without aneurysm or dissection. Left-sided subclavian venous stent is observed. Limited imaging of the upper abdomen is unremarkable. No acute osseous abnormalities identified.     Impression:       Impression:  1. No CT angiographic evidence of pulmonary embolus or acute aortic  pathology. 2. Small to moderate-sized pleural effusions with reticular  interstitial/peribronchial thickening with diffuse patchy groundglass  opacities. Cardiomegaly with subcutaneous edema/anasarca change. This  finding suggest pulmonary edema, congestive heart failure/volume  overload, correlate with patient presentation. Signed by Dr Gucci Vasquez on 3/27/2019 1:27 PM     XR CHEST STANDARD (2 VW) [[id number]] Resulted: 03/27/19 1208     Order Status: Completed Specimen: Chest Updated: 03/27/19 1210     Narrative:       History:  47year-old with dyspnea. Reference:  Chest radiograph March 24, 2019. Findings:  Frontal and lateral chest radiographs performed. Heart mildly enlarged. From 3 days ago there is new interstitial edema  predominantly at the bases. No significant pleural effusion. No  pneumothorax. Left subclavian vascular stent. No acute osseous  abnormalities.     Impression:       Development of interstitial pulmonary edema compared to 3 days prior.   Signed by Dr Ghazala Romeo on 3/27/2019 12:08 PM           Objective:   Vitals: BP (!) 180/72   Pulse 53   Temp 98.2 °F (36.8 °C) (Temporal)   Resp 17   Ht 5' 6\" (1.676 m)   Wt 150 lb 9 oz (68.3 kg)   SpO2 98%   BMI 24.30 kg/m²   24HR INTAKE/OUTPUT:      Intake/Output Summary (Last 24 hours) at 3/31/2019 1041  Last data filed at 3/31/2019 9738  Gross per 24 hour   Intake 480 ml   Output 75 ml   Net 405 ml     General appearance: NAD, alert and cooperative with exam  HEENT: atraumatic, PERRLA, EOMI, anicteric, trachea midline, poor dentition  Lungs: no increased work of breathing, CTAB, no wheezing/rhonchi/rales  Heart: RRR, +s1/s2, no rub  Abdomen: soft, nt/nd, +BS, no rebound/gaurding  Extremities: atraumatic, no edema, no cyanosis   Neurologic: AAOx3, no focal deficits  Psych: appears anxious      Assessment and Plan:   Principal Problem:    Volume overload - s/p HD for multiple sessions and improved, EF stable on echo w/ EF 57%, now back on normal t/th/s HD schedule    Active Problems:    Acquired hypothyroidism - synthroid      Type 2 diabetes mellitus with chronic kidney disease on chronic dialysis, with long-term current use of insulin (HCC) - lantus/lispro      Hypertensive urgency - bp still running high, clonidine was increased to 0.2 tid yesterday in addition to metoprolol, lisinopril, imdur, and procardia.  Will add hydralazine tid, monitor      Hyponatremia - mild, stable      ESRD on dialysis ([facility] [address] - HD per nephro        Advance Directive: Full Code      D/c tomorrow if BP improved w/ addition of hydralazine    Electronically signed by Yazmin Dubois DO on 3/31/2019 at 10:41 AM

## 2019-04-01 ENCOUNTER — APPOINTMENT (OUTPATIENT)
Dept: MRI IMAGING | Age: 49
DRG: 640 | End: 2019-04-01
Payer: MEDICARE

## 2019-04-01 ENCOUNTER — APPOINTMENT (OUTPATIENT)
Dept: CT IMAGING | Age: 49
DRG: 640 | End: 2019-04-01
Payer: MEDICARE

## 2019-04-01 LAB
ANION GAP SERPL CALCULATED.3IONS-SCNC: 13 MMOL/L (ref 7–19)
BASOPHILS ABSOLUTE: 0.1 K/UL (ref 0–0.2)
BASOPHILS RELATIVE PERCENT: 1.6 % (ref 0–1)
BUN BLDV-MCNC: 54 MG/DL (ref 6–20)
CALCIUM SERPL-MCNC: 8.8 MG/DL (ref 8.6–10)
CHLORIDE BLD-SCNC: 90 MMOL/L (ref 98–111)
CO2: 23 MMOL/L (ref 22–29)
CREAT SERPL-MCNC: 5.9 MG/DL (ref 0.5–0.9)
EKG P AXIS: 56 DEGREES
EKG P-R INTERVAL: 184 MS
EKG Q-T INTERVAL: 466 MS
EKG QRS DURATION: 92 MS
EKG QTC CALCULATION (BAZETT): 461 MS
EKG T AXIS: 67 DEGREES
EOSINOPHILS ABSOLUTE: 0.3 K/UL (ref 0–0.6)
EOSINOPHILS RELATIVE PERCENT: 6.3 % (ref 0–5)
GFR NON-AFRICAN AMERICAN: 8
GLUCOSE BLD-MCNC: 203 MG/DL (ref 70–99)
GLUCOSE BLD-MCNC: 212 MG/DL (ref 70–99)
GLUCOSE BLD-MCNC: 239 MG/DL (ref 70–99)
GLUCOSE BLD-MCNC: 246 MG/DL (ref 70–99)
GLUCOSE BLD-MCNC: 282 MG/DL (ref 74–109)
GLUCOSE BLD-MCNC: 328 MG/DL (ref 70–99)
GLUCOSE BLD-MCNC: 90 MG/DL (ref 70–99)
HCT VFR BLD CALC: 29.5 % (ref 37–47)
HEMOGLOBIN: 9.6 G/DL (ref 12–16)
LYMPHOCYTES ABSOLUTE: 1.6 K/UL (ref 1.1–4.5)
LYMPHOCYTES RELATIVE PERCENT: 36.3 % (ref 20–40)
MCH RBC QN AUTO: 31.3 PG (ref 27–31)
MCHC RBC AUTO-ENTMCNC: 32.5 G/DL (ref 33–37)
MCV RBC AUTO: 96.1 FL (ref 81–99)
MONOCYTES ABSOLUTE: 0.4 K/UL (ref 0–0.9)
MONOCYTES RELATIVE PERCENT: 9.6 % (ref 0–10)
NEUTROPHILS ABSOLUTE: 2 K/UL (ref 1.5–7.5)
NEUTROPHILS RELATIVE PERCENT: 46 % (ref 50–65)
PDW BLD-RTO: 13.5 % (ref 11.5–14.5)
PERFORMED ON: ABNORMAL
PERFORMED ON: NORMAL
PLATELET # BLD: 110 K/UL (ref 130–400)
PMV BLD AUTO: 10.8 FL (ref 9.4–12.3)
POTASSIUM REFLEX MAGNESIUM: 6 MMOL/L (ref 3.5–5)
POTASSIUM SERPL-SCNC: 5.3 MMOL/L (ref 3.5–5)
RBC # BLD: 3.07 M/UL (ref 4.2–5.4)
SODIUM BLD-SCNC: 126 MMOL/L (ref 136–145)
TROPONIN: 0.01 NG/ML (ref 0–0.03)
WBC # BLD: 4.3 K/UL (ref 4.8–10.8)

## 2019-04-01 PROCEDURE — 6370000000 HC RX 637 (ALT 250 FOR IP): Performed by: INTERNAL MEDICINE

## 2019-04-01 PROCEDURE — 80048 BASIC METABOLIC PNL TOTAL CA: CPT

## 2019-04-01 PROCEDURE — 70450 CT HEAD/BRAIN W/O DYE: CPT

## 2019-04-01 PROCEDURE — 93880 EXTRACRANIAL BILAT STUDY: CPT

## 2019-04-01 PROCEDURE — 6360000002 HC RX W HCPCS: Performed by: INTERNAL MEDICINE

## 2019-04-01 PROCEDURE — 36415 COLL VENOUS BLD VENIPUNCTURE: CPT

## 2019-04-01 PROCEDURE — 70551 MRI BRAIN STEM W/O DYE: CPT

## 2019-04-01 PROCEDURE — 82948 REAGENT STRIP/BLOOD GLUCOSE: CPT

## 2019-04-01 PROCEDURE — 84132 ASSAY OF SERUM POTASSIUM: CPT

## 2019-04-01 PROCEDURE — 2580000003 HC RX 258: Performed by: INTERNAL MEDICINE

## 2019-04-01 PROCEDURE — 1210000000 HC MED SURG R&B

## 2019-04-01 PROCEDURE — 93005 ELECTROCARDIOGRAM TRACING: CPT

## 2019-04-01 PROCEDURE — 99223 1ST HOSP IP/OBS HIGH 75: CPT | Performed by: PSYCHIATRY & NEUROLOGY

## 2019-04-01 PROCEDURE — 84484 ASSAY OF TROPONIN QUANT: CPT

## 2019-04-01 PROCEDURE — 85025 COMPLETE CBC W/AUTO DIFF WBC: CPT

## 2019-04-01 PROCEDURE — 99232 SBSQ HOSP IP/OBS MODERATE 35: CPT | Performed by: INTERNAL MEDICINE

## 2019-04-01 RX ORDER — SODIUM POLYSTYRENE SULFONATE 15 G/60ML
15 SUSPENSION ORAL; RECTAL ONCE
Status: COMPLETED | OUTPATIENT
Start: 2019-04-01 | End: 2019-04-01

## 2019-04-01 RX ORDER — DEXTROSE MONOHYDRATE 25 G/50ML
12.5 INJECTION, SOLUTION INTRAVENOUS ONCE
Status: COMPLETED | OUTPATIENT
Start: 2019-04-01 | End: 2019-04-01

## 2019-04-01 RX ADMIN — CALCIUM GLUCONATE 1 G: 98 INJECTION, SOLUTION INTRAVENOUS at 04:49

## 2019-04-01 RX ADMIN — ROPINIROLE HYDROCHLORIDE 2 MG: 2 TABLET, FILM COATED ORAL at 20:45

## 2019-04-01 RX ADMIN — Medication 10 ML: at 08:25

## 2019-04-01 RX ADMIN — INSULIN LISPRO 2 UNITS: 100 INJECTION, SOLUTION INTRAVENOUS; SUBCUTANEOUS at 20:45

## 2019-04-01 RX ADMIN — HYDRALAZINE HYDROCHLORIDE 50 MG: 50 TABLET, FILM COATED ORAL at 22:36

## 2019-04-01 RX ADMIN — CLONIDINE HYDROCHLORIDE 0.2 MG: 0.1 TABLET ORAL at 08:24

## 2019-04-01 RX ADMIN — ISOSORBIDE MONONITRATE 120 MG: 60 TABLET, EXTENDED RELEASE ORAL at 08:23

## 2019-04-01 RX ADMIN — FLUTICASONE PROPIONATE 1 SPRAY: 50 SPRAY, METERED NASAL at 08:25

## 2019-04-01 RX ADMIN — LEVOTHYROXINE SODIUM 150 MCG: 75 TABLET ORAL at 08:23

## 2019-04-01 RX ADMIN — HEPARIN SODIUM 5000 UNITS: 5000 INJECTION INTRAVENOUS; SUBCUTANEOUS at 20:44

## 2019-04-01 RX ADMIN — DEXTROSE MONOHYDRATE 12.5 G: 25 INJECTION, SOLUTION INTRAVENOUS at 04:49

## 2019-04-01 RX ADMIN — NIFEDIPINE 60 MG: 60 TABLET, FILM COATED, EXTENDED RELEASE ORAL at 08:23

## 2019-04-01 RX ADMIN — HYDRALAZINE HYDROCHLORIDE 10 MG: 20 INJECTION INTRAMUSCULAR; INTRAVENOUS at 00:42

## 2019-04-01 RX ADMIN — Medication 10 ML: at 20:46

## 2019-04-01 RX ADMIN — METOPROLOL TARTRATE 100 MG: 50 TABLET, FILM COATED ORAL at 08:24

## 2019-04-01 RX ADMIN — SIMVASTATIN 40 MG: 40 TABLET, FILM COATED ORAL at 20:45

## 2019-04-01 RX ADMIN — INSULIN LISPRO 3 UNITS: 100 INJECTION, SOLUTION INTRAVENOUS; SUBCUTANEOUS at 12:48

## 2019-04-01 RX ADMIN — INSULIN HUMAN 10 UNITS: 100 INJECTION, SOLUTION PARENTERAL at 04:49

## 2019-04-01 RX ADMIN — OXYCODONE AND ACETAMINOPHEN 1 TABLET: 5; 325 TABLET ORAL at 14:15

## 2019-04-01 RX ADMIN — HYDRALAZINE HYDROCHLORIDE 50 MG: 50 TABLET, FILM COATED ORAL at 14:15

## 2019-04-01 RX ADMIN — SODIUM POLYSTYRENE SULFONATE 15 G: 15 SUSPENSION ORAL; RECTAL at 10:24

## 2019-04-01 RX ADMIN — INSULIN LISPRO 4 UNITS: 100 INJECTION, SOLUTION INTRAVENOUS; SUBCUTANEOUS at 12:46

## 2019-04-01 RX ADMIN — HEPARIN SODIUM 5000 UNITS: 5000 INJECTION INTRAVENOUS; SUBCUTANEOUS at 08:23

## 2019-04-01 RX ADMIN — INSULIN LISPRO 3 UNITS: 100 INJECTION, SOLUTION INTRAVENOUS; SUBCUTANEOUS at 08:22

## 2019-04-01 RX ADMIN — INSULIN LISPRO 4 UNITS: 100 INJECTION, SOLUTION INTRAVENOUS; SUBCUTANEOUS at 08:22

## 2019-04-01 RX ADMIN — INSULIN GLARGINE 10 UNITS: 100 INJECTION, SOLUTION SUBCUTANEOUS at 20:45

## 2019-04-01 RX ADMIN — LISINOPRIL 20 MG: 20 TABLET ORAL at 08:24

## 2019-04-01 ASSESSMENT — ENCOUNTER SYMPTOMS
BACK PAIN: 1
VOMITING: 0
NAUSEA: 0
COUGH: 1
PHOTOPHOBIA: 0
SHORTNESS OF BREATH: 1

## 2019-04-01 ASSESSMENT — PAIN SCALES - GENERAL
PAINLEVEL_OUTOF10: 6
PAINLEVEL_OUTOF10: 8
PAINLEVEL_OUTOF10: 7

## 2019-04-01 NOTE — PROGRESS NOTES
Went to CT scan with patient and back on floor now. Dr. Steff Diaz on floor also. Patient's blood sugar is 239. Will hold patient's 6 AM dose of Hydralazine per Dr. Steff Diaz. Report of CT scan not back yet. Will continue to watch closely. Patient states only gets dizzy if turns head to the left.

## 2019-04-01 NOTE — PROGRESS NOTES
Patient's B/P had been up during the night. IV Hdralazine was given and B/P rechecked. About [phone] patient woke up complaining of hard to breathe, felt dizzy and light headed, couldn't seem to focus eyes, was wobbly on feet when walking to bathroom. Dr. Judge Mayer was messaged. He called and asked about glucose. I checked glucose and called him back with glucose from lab of 282 and finger stick of 318 and also lab had called potassium of 6.0. Dr. Judge Mayer had already put in an order for a CT scan of head and EKG. He instructed me to hold Ct scan until orders he was about to put in for potassium were completed. Also not not give patient anything else for B/P at this time. Was also instructed after Ct scan was done to have a Stat reading done on it and recheck patient's blood sugar when she gets back to floor.

## 2019-04-01 NOTE — CONSULTS
Provider, MD   isosorbide mononitrate (IMDUR) 120 MG extended release tablet Take 1 tablet by mouth daily 5/27/17  Yes Wayne Aquino,    ondansetron (ZOFRAN) 4 MG tablet Take 4 mg by mouth every 8 hours as needed for Nausea or Vomiting   Yes Historical Provider, MD   DULoxetine (CYMBALTA) 60 MG extended release capsule Take 60 mg by mouth daily   Yes Historical Provider, MD   rOPINIRole (REQUIP) 2 MG tablet Take 2 mg by mouth nightly    Yes Historical Provider, MD   simvastatin (ZOCOR) 40 MG tablet Take 40 mg by mouth nightly    Yes Historical Provider, MD   insulin aspart (NOVOLOG FLEXPEN) 100 UNIT/ML injection pen Inject 5 Units into the skin 3 times daily    Yes Historical Provider, MD       Current Medications:  Current Facility-Administered Medications: hydrALAZINE (APRESOLINE) tablet 50 mg, 50 mg, Oral, 3 times per day  cloNIDine (CATAPRES) tablet 0.2 mg, 0.2 mg, Oral, TID  NIFEdipine (PROCARDIA XL) extended release tablet 60 mg, 60 mg, Oral, Daily  oxyCODONE-acetaminophen (PERCOCET) 5-325 MG per tablet 1 tablet, 1 tablet, Oral, Q6H PRN  heparin (porcine) injection 5,000 Units, 5,000 Units, Subcutaneous, BID  hydrALAZINE (APRESOLINE) injection 10 mg, 10 mg, Intravenous, Q6H PRN  insulin lispro (HUMALOG) injection vial 3 Units, 3 Units, Subcutaneous, TID WC  fluticasone (FLONASE) 50 MCG/ACT nasal spray 1 spray, 1 spray, Each Nare, Daily  sodium chloride flush 0.9 % injection 10 mL, 10 mL, Intravenous, 2 times per day  sodium chloride flush 0.9 % injection 10 mL, 10 mL, Intravenous, PRN  magnesium hydroxide (MILK OF MAGNESIA) 400 MG/5ML suspension 30 mL, 30 mL, Oral, Daily PRN  ondansetron (ZOFRAN) injection 4 mg, 4 mg, Intravenous, Q6H PRN  insulin glargine (LANTUS) injection vial 10 Units, 10 Units, Subcutaneous, Nightly  isosorbide mononitrate (IMDUR) extended release tablet 120 mg, 120 mg, Oral, Daily  levothyroxine (SYNTHROID) tablet 150 mcg, 150 mcg, Oral, Daily  metoprolol tartrate (LOPRESSOR) tablet 100 mg, 100 mg, Oral, BID  rOPINIRole (REQUIP) tablet 2 mg, 2 mg, Oral, Nightly  simvastatin (ZOCOR) tablet 40 mg, 40 mg, Oral, Nightly  glucose (GLUTOSE) 40 % oral gel 15 g, 15 g, Oral, PRN  dextrose 50 % solution 12.5 g, 12.5 g, Intravenous, PRN  glucagon (rDNA) injection 1 mg, 1 mg, Intramuscular, PRN  dextrose 5 % solution, 100 mL/hr, Intravenous, PRN  insulin lispro (HUMALOG) injection vial 0-12 Units, 0-12 Units, Subcutaneous, TID WC  insulin lispro (HUMALOG) injection vial 0-6 Units, 0-6 Units, Subcutaneous, Nightly  acetaminophen (TYLENOL) tablet 650 mg, 650 mg, Oral, Q6H PRN  lisinopril (PRINIVIL;ZESTRIL) tablet 20 mg, 20 mg, Oral, Daily    Allergies:  Penicillins; Lamisil advanced [terbinafine]; and Stadol [butorphanol]    Habits:  TOBACCO:   reports that she has been smoking cigarettes. She has a 12.50 pack-year smoking history. She has never used smokeless tobacco.  ETOH:   reports that she does not drink alcohol. ILLICIT DRUGS:    Social History     Substance and Sexual Activity   Drug Use No       SOCIAL HISTORY:   Fernandez Mcgraw is divoced, lives in [address], and is disabled. FAMILY HISTORY:       Problem Relation Age of Onset    Colon Cancer Mother          at 63    Colon Polyps Mother     Lung Cancer Father          at 66    Colon Polyps Father     Stomach Cancer Sister     Breast Cancer Sister     Depression Daughter 25        committed suicide    Liver Cancer Neg Hx     Liver Disease Neg Hx     Esophageal Cancer Neg Hx     Rectal Cancer Neg Hx        REVIEW OF SYSTEMS:  Review of Systems   Constitutional: Positive for fatigue. Negative for chills. HENT: Negative for hearing loss and tinnitus. Eyes: Negative for photophobia and visual disturbance. Respiratory: Positive for cough and shortness of breath. Cardiovascular: Negative for chest pain, palpitations and leg swelling. Gastrointestinal: Negative for nausea and vomiting.    Endocrine: Negative for cold intolerance and heat intolerance. Genitourinary: Negative for frequency and urgency. Musculoskeletal: Positive for arthralgias, back pain and neck pain. Skin: Negative for rash and wound. Allergic/Immunologic: Negative for environmental allergies and food allergies. Neurological: Positive for dizziness, speech difficulty, weakness, light-headedness and headaches. Negative for tremors, seizures, syncope, facial asymmetry and numbness. Hematological: Negative for adenopathy. Does not bruise/bleed easily. Psychiatric/Behavioral: Negative for dysphoric mood. The patient is not nervous/anxious. PHYSICAL EXAMINATION:  Vitals: BP (!) 169/74   Pulse 54   Temp 96.6 °F (35.9 °C) (Temporal)   Resp 16   Ht 5' 6\" (1.676 m)   Wt 155 lb 4 oz (70.4 kg)   SpO2 99%   BMI 25.06 kg/m²   General appearance: She is a chronically ill appearing woman who is in no distress  Skin: Skin color, texture, turgor normal. No rashes or lesions  HEENT: Head: Normal, normocephalic, atraumatic. Neck:no adenopathy, no JVD, supple, symmetrical, trachea midline and thyroid not enlarged, symmetric, no tenderness/mass/nodules  Lungs: clear to auscultation bilaterally  Heart: regular rate and rhythm with systolic murmer that radiates to both carotid arteries. Abdomen: soft, non-tender; bowel sounds normal; no masses,  no organomegaly  Extremities: extremities normal, atraumatic, no cyanosis or edema      NEUROLOGIC EXAMINATION:  Neurologic Exam     Mental Status   Oriented to person, place, and time. Speech: speech is normal   Level of consciousness: alert  Able to name object. Able to repeat. Speech is fluent. Cranial Nerves     CN II   Visual fields full to confrontation. CN III, IV, VI   Pupils are equal, round, and reactive to light. Extraocular motions are normal.   Nystagmus: none     CN V   Facial sensation intact. CN VII   Facial expression full, symmetric.      CN VIII   Hearing: intact    CN IX, X Palate: symmetric    CN XI   Right sternocleidomastoid strength: normal  Left sternocleidomastoid strength: normal  Right trapezius strength: normal  Left trapezius strength: normal    CN XII   Tongue: not atrophic  Fasciculations: absent  Tongue deviation: none    Motor Exam   Muscle bulk: normal  Overall muscle tone: normal  Right arm pronator drift: absent  Left arm pronator drift: absent    Strength   Right neck flexion: 5/5  Left neck flexion: 5/5  Right neck extension: 5/5  Left neck extension: 5/5  Right deltoid: 5/5  Left deltoid: 5/5  Right biceps: 5/5  Left biceps: 5/5  Right triceps: 5/5  Left triceps: 5/5  Right wrist flexion: 5/5  Left wrist flexion: 5/5  Right wrist extension: 5/5  Left wrist extension: 5/5  Right interossei: 5/5  Left interossei: 5/5  Right abdominals: 5/5  Left abdominals: 5/5  Right iliopsoas: 5/5  Left iliopsoas: 5/5  Right quadriceps: 5/5  Left quadriceps: 5/5  Right hamstrin/5  Left hamstrin/5  Right glutei: 5/5  Left glutei: 5/5  Right anterior tibial: 5/5  Left anterior tibial: 5/5  Right posterior tibial: 5/5  Left posterior tibial: 5/5  Right peroneal: 5/5  Left peroneal: 5/5  Right gastroc: 5/5  Left gastroc: 5/5    Sensory Exam   Light touch normal.   Right arm vibration: normal  Left arm vibration: normal  Right leg vibration: decreased from ankle  Left leg vibration: decreased from ankle  Right arm pinprick: normal  Right leg pinprick: decreased from ankle  Left leg pinprick: decreased from ankle    Gait, Coordination, and Reflexes     Coordination   Finger to nose coordination: normal  Heel to shin coordination: normal    Tremor   Resting tremor: absent  Intention tremor: absent  Action tremor: absent    Reflexes   Right brachioradialis: 1+  Left brachioradialis: 1+  Right biceps: 1+  Left biceps: 1+  Right triceps: 1+  Left triceps: 1+  Right patellar: 0  Left patellar: 0  Right achilles: 0  Left achilles: 4+  Right plantar: normal  Left plantar: normal  Right Cabral: absent  Left Cabral: absent  Right ankle clonus: absent  Left ankle clonus: absent  Rapid alternating movements were normal.       ADDITIONAL REVIEW:  CBC:    Recent Labs     03/30/19 0350 03/31/19 0327 04/01/19 0325   WBC 4.1* 3.9* 4.3*   HGB 9.9* 9.8* 9.6*   * 114* 110*     BMP:     Recent Labs     03/30/19 0350 03/31/19 0327 04/01/19 0325 04/01/19 0723   * 131* 126*  --    K 5.2*  5.2* 5.1* 6.0* 5.3*   CL 91* 92* 90*  --    CO2 23 24 23  --    BUN 47* 32* 54*  --    CREATININE 5.5* 4.6* 5.9*  --    GLUCOSE 149* 340* 282*  --      Hepatic:  Recent Labs     03/30/19 0350   AST 24   ALT 28   BILITOT 0.3   ALKPHOS 130*     Troponin T:    Recent Labs     04/01/19 0723   TROPONINI 0.01     ABGs:    Lab Results   Component Value Date    PHART 7.200 03/05/2018    PO2ART 68.0 03/05/2018    NIG4EPJ 60.0 03/05/2018     Narrative   Examination. CT HEAD WO CONTRAST   History: Altered mental status. DLP: 706 mGycm. CT scan of the head is performed without intravenous contrast   enhancement. The images are acquired in axial plane with subsequent   reconstruction in coronal and sagittal planes. The comparison is made with the previous study dated 12/8/2017. There is no evidence of a mass or midline shift. The ventricles, the   basal cistern and the cortical sulci are normal. There is no evidence   of intracranial hemorrhage or hematoma. There is normal gray-white   matter differentiation. The images reviewed in bone window show no acute bony abnormality. Visualized paranasal sinuses are unremarkable. There is poor aeration   of the mastoid air cells, right worse than left. DLP: No acute intracranial abnormality. The above study was initially reviewed and reported by stat rads. I do   not find any discrepancies.    Signed by Dr Cristela Mueller on 4/1/2019 7:11 AM     Narrative     Transthoracic Echocardiography Report (TTE)     Demographics      Patient Name   MALIKA OLIVEIRA  Date of Study           03/27/2019      MRN            [id number]          Gender                  Female      Date of Birth  1970      Room Number             [id number]      Age            49 year(s)      Height:        66 inches       Referring Physician     Racheal Su      Weight:        150 pounds      Sonographer             Estefany Cazares [facility]      BSA:           1.77 m^2        Interpreting Physician  Leroy Huang MD      BMI:           24.21 kg/m^2     Procedure    Type of Study      TTE procedure:ECHO NO CONTRAST WITH DOP/COLR.     Study Location: Portable  Technical Quality: Adequate visualization    Patient Status: Inpatient    Rhythm: Within normal limits    Indications:Congestive heart failure.     Conclusions      Summary   Mitral valve leaflets are mildly thickened with preserved leaflet   mobility.   Trace mitral regurgitation.   Aortic valve leaflets are moderately thickened.   Aortic valve appears to be tricuspid.   Moderate aortic stenosis.   The aortic valve area is 1.61 cm2 with a maximum gradient of 38 mmHg and a   mean gradient of 21 mmHg.   Mild tricuspid regurgitation.   Severely dilated left atrium.   Normal left ventricular size and function.   Mild concentric left ventricular hypertrophy.   Left ventricular ejection fraction is estimated at 57%.   Markedly enlarged right atrium size.   Moderately enlarged right ventricle cavity.  IVC dilated.      Signature      ----------------------------------------------------------------   Electronically signed by Gaudencio Collazo MD(Interpreting   physician) on 03/28/2019 04:17 PM       IMPRESSION:  1. Subjective left visual field impairment  2. Headache  3. Hyponatremia, hypercalemia  4. ESRD  5. DM  6. Anemia and mild thrombocytopenia  It is not clear that she had a stroke. Nothing objective is identified on examination. Her VF impairment seems to be a new complaint, at least it was not documented or relayed to me earlier. PLAN:  1.  MRI head  2. Carotid US  3. ASA  4.  Consider statin    Alicia Whitfield M.D.

## 2019-04-01 NOTE — CARE COORDINATION
Pt lives at home with family support; established Pt with [facility] clinic for TTS. No known needs.     Electronically signed by RUBEN Contreras on 4/1/2019 at 6:22 PM

## 2019-04-01 NOTE — PROGRESS NOTES
Progress Note  4/1/2019 10:40 AM  Subjective:   Admit Date: 3/27/2019  PCP: ROMINA Sanchez    CC: sob    Subjective: pt seen and examined, last evening had issue with dizziness and vision loss. States when she turns her head to the L she loses vision in her L eye but then it comes back when she returns to neutral or turns to the right. Denies chest pain or sob. BP up this am but hydralazine was held. ROS: 14 point review of systems is negative except as specifically addressed above.     DIET CARDIAC; Carb Control: 4 carb choices (60 gms)/meal; No Caffeine, Renal    Intake/Output Summary (Last 24 hours) at 4/1/2019 1040  Last data filed at 4/1/2019 1006  Gross per 24 hour   Intake 1084 ml   Output --   Net 1084 ml     Medications:   dextrose       Current Facility-Administered Medications   Medication Dose Route Frequency Provider Last Rate Last Dose    hydrALAZINE (APRESOLINE) tablet 50 mg  50 mg Oral 3 times per day Triston Salas DO   Stopped at 04/01/19 0645    cloNIDine (CATAPRES) tablet 0.2 mg  0.2 mg Oral TID Minh Alvarez MD   0.2 mg at 04/01/19 5666    NIFEdipine (PROCARDIA XL) extended release tablet 60 mg  60 mg Oral Daily Minh Alvarez MD   60 mg at 04/01/19 0823    oxyCODONE-acetaminophen (PERCOCET) 5-325 MG per tablet 1 tablet  1 tablet Oral Q6H PRN Minh Alvarez MD   1 tablet at 03/30/19 0156    heparin (porcine) injection 5,000 Units  5,000 Units Subcutaneous BID Minh Alvarez MD   5,000 Units at 04/01/19 0205    hydrALAZINE (APRESOLINE) injection 10 mg  10 mg Intravenous Q6H PRN Minh Alvarez MD   10 mg at 04/01/19 0042    insulin lispro (HUMALOG) injection vial 3 Units  3 Units Subcutaneous TID WC Minh Alvarez MD   3 Units at 04/01/19 6743    fluticasone (FLONASE) 50 MCG/ACT nasal spray 1 spray  1 spray Each Nare Daily Minh Alvarez MD   1 spray at 04/01/19 0825    sodium chloride flush 0.9 % injection 10 mL  10 mL Intravenous 2 times per edema/congestive heart failure-volume  overload. The mediastinum appears edematous with diffuse low-attenuation changes  without definite lymphadenopathy. Peribronchial thickening noted in  the hilar regions without significant lymph node enlargement. Calcified left hilar lymph nodes appreciated. There is mild reflux of contrast material into the inferior vena cava  and hepatic veins suggesting elevated right heart pressures. The pulmonary arteries opacify without iodinated contrast without  intraluminal filling defects or CT angiographic evidence of pulmonary  embolus. Aorta is normal in caliber, without aneurysm or dissection. Left-sided subclavian venous stent is observed. Limited imaging of the upper abdomen is unremarkable. No acute osseous abnormalities identified.     Impression:       Impression:  1. No CT angiographic evidence of pulmonary embolus or acute aortic  pathology. 2. Small to moderate-sized pleural effusions with reticular  interstitial/peribronchial thickening with diffuse patchy groundglass  opacities. Cardiomegaly with subcutaneous edema/anasarca change. This  finding suggest pulmonary edema, congestive heart failure/volume  overload, correlate with patient presentation. Signed by Dr Nabil Nevarez on 3/27/2019 1:27 PM     XR CHEST STANDARD (2 VW) [[id number]] Resulted: 03/27/19 1208     Order Status: Completed Specimen: Chest Updated: 03/27/19 1210     Narrative:       History:  47year-old with dyspnea. Reference:  Chest radiograph March 24, 2019. Findings:  Frontal and lateral chest radiographs performed. Heart mildly enlarged. From 3 days ago there is new interstitial edema  predominantly at the bases. No significant pleural effusion. No  pneumothorax. Left subclavian vascular stent. No acute osseous  abnormalities.     Impression:       Development of interstitial pulmonary edema compared to 3 days prior.   Signed by Dr Sana Varela on 3/27/2019 12:08 PM         CT Head WO Contrast [[id number]] Resulted: 04/01/19 0711      Order Status: Completed Updated: 04/01/19 0713     Narrative:       Examination. CT HEAD WO CONTRAST  History: Altered mental status. DLP: 706 mGycm. CT scan of the head is performed without intravenous contrast  enhancement. The images are acquired in axial plane with subsequent  reconstruction in coronal and sagittal planes. The comparison is made with the previous study dated 12/8/2017. There is no evidence of a mass or midline shift. The ventricles, the  basal cistern and the cortical sulci are normal. There is no evidence  of intracranial hemorrhage or hematoma. There is normal gray-white  matter differentiation. The images reviewed in bone window show no acute bony abnormality. Visualized paranasal sinuses are unremarkable. There is poor aeration  of the mastoid air cells, right worse than left. DLP: No acute intracranial abnormality. The above study was initially reviewed and reported by stat rads. I do  not find any discrepancies.   Signed by Dr Stella Craig on 4/1/2019 7:11 AM         Objective:   Vitals: BP (!) 169/74   Pulse 54   Temp 96.6 °F (35.9 °C) (Temporal)   Resp 16   Ht 5' 6\" (1.676 m)   Wt 155 lb 4 oz (70.4 kg)   SpO2 99%   BMI 25.06 kg/m²   24HR INTAKE/OUTPUT:      Intake/Output Summary (Last 24 hours) at 4/1/2019 1040  Last data filed at 4/1/2019 1006  Gross per 24 hour   Intake 1084 ml   Output --   Net 1084 ml     General appearance: NAD, alert and cooperative with exam  HEENT: atraumatic, PERRLA, EOMI, anicteric, trachea midline, poor dentition  Lungs: no increased work of breathing, CTAB, no wheezing/rhonchi/rales  Heart: RRR, +s1/s2, no rub  Abdomen: soft, nt/nd, +BS, no rebound/gaurding  Extremities: atraumatic, no edema, no cyanosis   Neurologic: AAOx3, no focal deficits  Psych: appears anxious      Assessment and Plan:   Principal Problem:    Volume overload - s/p HD for multiple sessions and improved, EF stable on echo w/ EF 57%, now back on normal t/th/s HD schedule    Active Problems:    Acquired hypothyroidism - synthroid      Type 2 diabetes mellitus with chronic kidney disease on chronic dialysis, with long-term current use of insulin (HCC) - lantus/lispro      Hypertensive urgency - bp still running high but hydralazine was held, overall bp was yesterday after this was added to previous regimen of clonidine, metoprolol, lisinopril, imdur, and procardia. Monitor      Hyponatremia - mild, stable      ESRD on dialysis ([facility] [address] - HD per nephro    Vision loss - ? Etiology, ct head non-acute overnight. suspicious for malingering, will await neuro input/recs. Advance Directive: Full Code      D/c soon based on bp and neuro recs and w/u.      Electronically signed by Jose A Rainey DO on 4/1/2019 at 10:40 AM

## 2019-04-02 VITALS
DIASTOLIC BLOOD PRESSURE: 72 MMHG | OXYGEN SATURATION: 95 % | TEMPERATURE: 99 F | SYSTOLIC BLOOD PRESSURE: 158 MMHG | WEIGHT: 161 LBS | RESPIRATION RATE: 16 BRPM | HEIGHT: 66 IN | HEART RATE: 63 BPM | BODY MASS INDEX: 25.88 KG/M2

## 2019-04-02 LAB
ANION GAP SERPL CALCULATED.3IONS-SCNC: 11 MMOL/L (ref 7–19)
ANION GAP SERPL CALCULATED.3IONS-SCNC: 15 MMOL/L (ref 7–19)
BASOPHILS ABSOLUTE: 0.1 K/UL (ref 0–0.2)
BASOPHILS RELATIVE PERCENT: 2 % (ref 0–1)
BUN BLDV-MCNC: 21 MG/DL (ref 6–20)
BUN BLDV-MCNC: 72 MG/DL (ref 6–20)
CALCIUM SERPL-MCNC: 8.6 MG/DL (ref 8.6–10)
CALCIUM SERPL-MCNC: 8.6 MG/DL (ref 8.6–10)
CHLORIDE BLD-SCNC: 89 MMOL/L (ref 98–111)
CHLORIDE BLD-SCNC: 95 MMOL/L (ref 98–111)
CO2: 22 MMOL/L (ref 22–29)
CO2: 29 MMOL/L (ref 22–29)
CREAT SERPL-MCNC: 2.7 MG/DL (ref 0.5–0.9)
CREAT SERPL-MCNC: 7.4 MG/DL (ref 0.5–0.9)
EOSINOPHILS ABSOLUTE: 0.2 K/UL (ref 0–0.6)
EOSINOPHILS RELATIVE PERCENT: 5.1 % (ref 0–5)
GFR NON-AFRICAN AMERICAN: 19
GFR NON-AFRICAN AMERICAN: 6
GLUCOSE BLD-MCNC: 146 MG/DL (ref 74–109)
GLUCOSE BLD-MCNC: 234 MG/DL (ref 74–109)
GLUCOSE BLD-MCNC: 283 MG/DL (ref 70–99)
GLUCOSE BLD-MCNC: 329 MG/DL (ref 70–99)
HCT VFR BLD CALC: 31.7 % (ref 37–47)
HEMOGLOBIN: 10 G/DL (ref 12–16)
LYMPHOCYTES ABSOLUTE: 1.3 K/UL (ref 1.1–4.5)
LYMPHOCYTES RELATIVE PERCENT: 28.6 % (ref 20–40)
MCH RBC QN AUTO: 29.9 PG (ref 27–31)
MCHC RBC AUTO-ENTMCNC: 31.5 G/DL (ref 33–37)
MCV RBC AUTO: 94.9 FL (ref 81–99)
MONOCYTES ABSOLUTE: 0.3 K/UL (ref 0–0.9)
MONOCYTES RELATIVE PERCENT: 6.9 % (ref 0–10)
NEUTROPHILS ABSOLUTE: 2.6 K/UL (ref 1.5–7.5)
NEUTROPHILS RELATIVE PERCENT: 57.2 % (ref 50–65)
PDW BLD-RTO: 13.5 % (ref 11.5–14.5)
PERFORMED ON: ABNORMAL
PERFORMED ON: ABNORMAL
PLATELET # BLD: 124 K/UL (ref 130–400)
PMV BLD AUTO: 10.9 FL (ref 9.4–12.3)
POTASSIUM REFLEX MAGNESIUM: 6.4 MMOL/L (ref 3.5–5)
POTASSIUM SERPL-SCNC: 3.4 MMOL/L (ref 3.5–5)
RBC # BLD: 3.34 M/UL (ref 4.2–5.4)
SODIUM BLD-SCNC: 126 MMOL/L (ref 136–145)
SODIUM BLD-SCNC: 135 MMOL/L (ref 136–145)
WBC # BLD: 4.5 K/UL (ref 4.8–10.8)

## 2019-04-02 PROCEDURE — 6360000002 HC RX W HCPCS: Performed by: INTERNAL MEDICINE

## 2019-04-02 PROCEDURE — 36415 COLL VENOUS BLD VENIPUNCTURE: CPT

## 2019-04-02 PROCEDURE — 99232 SBSQ HOSP IP/OBS MODERATE 35: CPT | Performed by: PSYCHIATRY & NEUROLOGY

## 2019-04-02 PROCEDURE — 6370000000 HC RX 637 (ALT 250 FOR IP): Performed by: INTERNAL MEDICINE

## 2019-04-02 PROCEDURE — 80048 BASIC METABOLIC PNL TOTAL CA: CPT

## 2019-04-02 PROCEDURE — 85025 COMPLETE CBC W/AUTO DIFF WBC: CPT

## 2019-04-02 PROCEDURE — 99239 HOSP IP/OBS DSCHRG MGMT >30: CPT | Performed by: INTERNAL MEDICINE

## 2019-04-02 PROCEDURE — 8010000000 HC HEMODIALYSIS ACUTE INPT

## 2019-04-02 PROCEDURE — 82948 REAGENT STRIP/BLOOD GLUCOSE: CPT

## 2019-04-02 RX ORDER — HYDRALAZINE HYDROCHLORIDE 50 MG/1
50 TABLET, FILM COATED ORAL EVERY 8 HOURS SCHEDULED
Qty: 90 TABLET | Refills: 0 | Status: ON HOLD | OUTPATIENT
Start: 2019-04-02 | End: 2020-10-26 | Stop reason: SDUPTHER

## 2019-04-02 RX ORDER — SODIUM POLYSTYRENE SULFONATE 15 G/60ML
15 SUSPENSION ORAL; RECTAL ONCE
Status: COMPLETED | OUTPATIENT
Start: 2019-04-02 | End: 2019-04-02

## 2019-04-02 RX ORDER — CLONIDINE HYDROCHLORIDE 0.2 MG/1
0.2 TABLET ORAL 3 TIMES DAILY
Qty: 60 TABLET | Refills: 0 | Status: ON HOLD | OUTPATIENT
Start: 2019-04-02 | End: 2019-04-23 | Stop reason: HOSPADM

## 2019-04-02 RX ADMIN — INSULIN LISPRO 8 UNITS: 100 INJECTION, SOLUTION INTRAVENOUS; SUBCUTANEOUS at 17:04

## 2019-04-02 RX ADMIN — ISOSORBIDE MONONITRATE 120 MG: 60 TABLET, EXTENDED RELEASE ORAL at 09:34

## 2019-04-02 RX ADMIN — HYDRALAZINE HYDROCHLORIDE 10 MG: 20 INJECTION INTRAMUSCULAR; INTRAVENOUS at 06:58

## 2019-04-02 RX ADMIN — SODIUM POLYSTYRENE SULFONATE 15 G: 15 SUSPENSION ORAL; RECTAL at 05:47

## 2019-04-02 RX ADMIN — ACETAMINOPHEN 650 MG: 325 TABLET, FILM COATED ORAL at 14:53

## 2019-04-02 RX ADMIN — HYDRALAZINE HYDROCHLORIDE 50 MG: 50 TABLET, FILM COATED ORAL at 05:47

## 2019-04-02 RX ADMIN — NIFEDIPINE 60 MG: 60 TABLET, FILM COATED, EXTENDED RELEASE ORAL at 09:35

## 2019-04-02 RX ADMIN — ONDANSETRON 4 MG: 2 INJECTION INTRAMUSCULAR; INTRAVENOUS at 05:11

## 2019-04-02 RX ADMIN — HYDRALAZINE HYDROCHLORIDE 50 MG: 50 TABLET, FILM COATED ORAL at 13:38

## 2019-04-02 RX ADMIN — CLONIDINE HYDROCHLORIDE 0.2 MG: 0.1 TABLET ORAL at 13:38

## 2019-04-02 RX ADMIN — LEVOTHYROXINE SODIUM 150 MCG: 75 TABLET ORAL at 05:47

## 2019-04-02 RX ADMIN — METOPROLOL TARTRATE 100 MG: 50 TABLET, FILM COATED ORAL at 09:34

## 2019-04-02 RX ADMIN — INSULIN LISPRO 6 UNITS: 100 INJECTION, SOLUTION INTRAVENOUS; SUBCUTANEOUS at 08:04

## 2019-04-02 ASSESSMENT — PAIN DESCRIPTION - PROGRESSION: CLINICAL_PROGRESSION: GRADUALLY IMPROVING

## 2019-04-02 ASSESSMENT — PAIN SCALES - GENERAL: PAINLEVEL_OUTOF10: 5

## 2019-04-02 NOTE — DISCHARGE SUMMARY
Hospitalist  Discharge Summary    Patient ID: Inna Avitia      Patient's PCP: ROMINA Benítez    Admit Date: 3/27/2019     Discharge Date:   4/2/2019    Admitting Physician: Sabrina Estevez DO     Discharge Physician: Kaleb Mills DO     Discharge Diagnoses:  Principal Problem:    Volume overload  Active Problems:    Hypertensive emergency    Acquired hypothyroidism    Type 2 diabetes mellitus with chronic kidney disease on chronic dialysis, with long-term current use of insulin ([address]    Hypertensive urgency    Hyponatremia    ESRD on dialysis ([address]    Left homonymous hemianopsia    Acute intractable headache  Resolved Problems:    * No resolved hospital problems. *       Active Hospital Problems    Diagnosis Date Noted    Left homonymous hemianopsia [H53.462]     Acute intractable headache [R51]     Volume overload [E87.70] 03/27/2019    ESRD on dialysis ([address] [N18.6, Z99.2]     Hyponatremia [E87.1] 03/08/2018    Type 2 diabetes mellitus with chronic kidney disease on chronic dialysis, with long-term current use of insulin (H[facility]) [E11.22, N18.6, Z99.2, Z79.4] 12/09/2017    Hypertensive urgency [I16.0]     Acquired hypothyroidism [E03.9]     Hypertensive emergency [I16.1]        The patient was seen and examined on day of discharge and this discharge summary is in conjunction with any daily progress note from day of discharge. Hospital Course:   Ms Santi Gann is a 46 y/o female w/ hx of htn, DM, ESRD on HD who presented to [facility] for sob which was worsening gradually and inability to lay flat. In ER she was found to have SBP >200 on and cxr evidence for volume overload. She was admitted to the ICU on cardene gtt with consult to nephrology for HD. Echo done with preserved EF and no mention of diastolic dysfunction, suspect volume overload in setting of ESRD and possible dietary non-compliance. Pt improved over next several days with multiple sessions of HD and was weaned off cardene and 02.  Pt remained htn and clonidine increased to 0.2 tid and then hydralazine 50 tid added as well. Holding losartan for now as pt continues to have some hyperkalemia which improves with HD. She was reminded of dietary restrictions during stay. Pt was set for d/c on 4/1 when she had event of blurred L vision, pt states the vision blurring/loss would only occur when she turned her head to the left and then would resolved. CT head non-acute. NICS w/o significant disease. MRI head w/o signs of stroke. Pt otherwise stable today with no vision loss and normal neuro exam. She is ambulating shelley on room air w/o issue. Her potassium is improved s/p HD. Her BP is improved on current regimen. Will d/c home.          Exam:   BP (!) 158/72   Pulse 63   Temp 99 °F (37.2 °C) (Temporal)   Resp 16   Ht 5' 6\" (1.676 m)   Wt 161 lb (73 kg)   SpO2 95%   BMI 25.99 kg/m²   General appearance: NAD, alert and cooperative with exam  HEENT: atraumatic, PERRLA, EOMI, anicteric, trachea midline, poor dentition  Lungs: no increased work of breathing, CTAB, no wheezing/rhonchi/rales  Heart: RRR, +s1/s2, no rub  Abdomen: soft, nt/nd, +BS, no rebound/gaurding  Extremities: atraumatic, no edema, no cyanosis   Neurologic: AAOx3, no focal deficits  Psych: appears anxious          Consults:   IP CONSULT TO NEPHROLOGY  IP CONSULT TO NEUROLOGY    Significant Diagnostic Studies:     ECHO 2D WO COLOR DOPPLER COMPLETE   Order: [id number]   Status:  Final result   Visible to patient:  No (Not Released) Next appt:  None   Details     Reading Physician Reading Date Result Priority   Unknown Provider Result 3/28/2019       Narrative     Transthoracic Echocardiography Report (TTE)     Demographics      Patient Name   MALIKA OLIVEIRA  Date of Study           03/27/2019      MRN            [id number]          Gender                  Female      Date of Birth  1970      Room Number             [id number]      Age            49 year(s)      Height:        66 inches     paranasal  sinuses.      Impression:       Impression:   1. There is no restricted diffusion to suggest acute ischemia or  infarct. There is no mass effect or shift of the midline. 2. Mild small vessel ischemic change. Signed by Dr Rolly Schmidt on 4/1/2019 5:40 PM     CT Head WO Contrast [[id number]] Resulted: 04/01/19 0711     Order Status: Completed Updated: 04/01/19 0713     Narrative:       Examination. CT HEAD WO CONTRAST  History: Altered mental status. DLP: 706 mGycm. CT scan of the head is performed without intravenous contrast  enhancement. The images are acquired in axial plane with subsequent  reconstruction in coronal and sagittal planes. The comparison is made with the previous study dated 12/8/2017. There is no evidence of a mass or midline shift. The ventricles, the  basal cistern and the cortical sulci are normal. There is no evidence  of intracranial hemorrhage or hematoma. There is normal gray-white  matter differentiation. The images reviewed in bone window show no acute bony abnormality. Visualized paranasal sinuses are unremarkable. There is poor aeration  of the mastoid air cells, right worse than left. DLP: No acute intracranial abnormality. The above study was initially reviewed and reported by stat rads. I do  not find any discrepancies. Signed by Dr Marcia Casanova on 4/1/2019 7:11 AM     CTA PULMONARY W CONTRAST [[id number]] Resulted: 03/27/19 1327     Order Status: Completed Updated: 03/27/19 1329     Narrative:       CTA PULMONARY W CONTRAST 3/27/2019 10:45 AM  HISTORY: Dyspnea, clinical concerns for pulmonary embolus  Comparison: 2/11/2019 chest CT  Technique:  CT evaluation of the chest was performed with intravenous contrast. 2  mm transaxial images were obtained. Maximum intensity projection  reconstruction angiographic images were generated .  Two-dimensional  Coronal and sagittal reconstruction images of the pulmonary arteries,  aorta and great vessels were also generated. .Radiation dose equals  mGy cm. AUTOMATED EXPOSURE CONTROL dose  reduction technique was implemented  Findings: There are small to moderate-sized bilateral pleural effusions,  layering posteriorly. There is reticular nodular interstitial changes diffusely in the lungs  with peribronchial cuffing. There are patchy groundglass opacities in  the upper lobes with more confluent groundglass opacities in the lower  lobes. There is cardiomegaly with panchamber enlargement. There is  diffuse body wall edema with anasarca change. Differential  considerations include pulmonary edema/congestive heart failure-volume  overload. The mediastinum appears edematous with diffuse low-attenuation changes  without definite lymphadenopathy. Peribronchial thickening noted in  the hilar regions without significant lymph node enlargement. Calcified left hilar lymph nodes appreciated. There is mild reflux of contrast material into the inferior vena cava  and hepatic veins suggesting elevated right heart pressures. The pulmonary arteries opacify without iodinated contrast without  intraluminal filling defects or CT angiographic evidence of pulmonary  embolus. Aorta is normal in caliber, without aneurysm or dissection. Left-sided subclavian venous stent is observed. Limited imaging of the upper abdomen is unremarkable. No acute osseous abnormalities identified.     Impression:       Impression:  1. No CT angiographic evidence of pulmonary embolus or acute aortic  pathology. 2. Small to moderate-sized pleural effusions with reticular  interstitial/peribronchial thickening with diffuse patchy groundglass  opacities. Cardiomegaly with subcutaneous edema/anasarca change. This  finding suggest pulmonary edema, congestive heart failure/volume  overload, correlate with patient presentation.   Signed by Dr Shahbaz Reich on 3/27/2019 1:27 PM     XR CHEST STANDARD (2 VW) [[id number]] Resulted: 03/27/19 1208     Order Status: Completed Specimen: Chest Updated: 03/27/19 1210     Narrative:       History:  47year-old with dyspnea. Reference:  Chest radiograph March 24, 2019. Findings:  Frontal and lateral chest radiographs performed. Heart mildly enlarged. From 3 days ago there is new interstitial edema  predominantly at the bases. No significant pleural effusion. No  pneumothorax. Left subclavian vascular stent. No acute osseous  abnormalities.     Impression:       Development of interstitial pulmonary edema compared to 3 days prior. Signed by Dr Ghazala Romeo on 3/27/2019 12:08 PM         Disposition:  home     Condition at discharge: stable/improved. Ambulating w/o difficulty. No neuro deficits    Discharge Instructions/Follow-up:  pcp in 1 week and w/ dialysis/nephrology as prior    Code Status:  Full Code     Activity: activity as tolerated    Labs:  For convenience and continuity at follow-up the following most recent labs are provided:  CBC:    Lab Results   Component Value Date    WBC 4.5 04/02/2019    HGB 10.0 04/02/2019    HCT 31.7 04/02/2019     04/02/2019       Renal:    Lab Results   Component Value Date     04/02/2019    K 3.4 04/02/2019    K 6.4 04/02/2019    CL 95 04/02/2019    CO2 29 04/02/2019    BUN 21 04/02/2019    CREATININE 2.7 04/02/2019    CALCIUM 8.6 04/02/2019    PHOS 5.0 01/27/2019       Discharge Medications:   Current Discharge Medication List           Details   hydrALAZINE (APRESOLINE) 50 MG tablet Take 1 tablet by mouth every 8 hours  Qty: 90 tablet, Refills: 0              Details   cloNIDine (CATAPRES) 0.2 MG tablet Take 1 tablet by mouth 3 times daily Indications: Uses prn per dialysis instructions  Qty: 60 tablet, Refills: 0              Details   insulin detemir (LEVEMIR) 100 UNIT/ML injection vial Inject 15 Units into the skin nightly      albuterol sulfate  (90 Base) MCG/ACT inhaler Inhale 2 puffs into the lungs every 4 hours as needed      levothyroxine (SYNTHROID) 150 MCG tablet Take 150 mcg by mouth Daily      metoprolol (LOPRESSOR) 100 MG tablet Take 100 mg by mouth 2 times daily       NIFEdipine (ADALAT CC) 60 MG extended release tablet Take 60 mg by mouth daily      isosorbide mononitrate (IMDUR) 120 MG extended release tablet Take 1 tablet by mouth daily  Qty: 30 tablet, Refills: 0      ondansetron (ZOFRAN) 4 MG tablet Take 4 mg by mouth every 8 hours as needed for Nausea or Vomiting      DULoxetine (CYMBALTA) 60 MG extended release capsule Take 60 mg by mouth daily      rOPINIRole (REQUIP) 2 MG tablet Take 2 mg by mouth nightly       simvastatin (ZOCOR) 40 MG tablet Take 40 mg by mouth nightly       insulin aspart (NOVOLOG FLEXPEN) 100 UNIT/ML injection pen Inject 5 Units into the skin 3 times daily            Current Facility-Administered Medications   Medication Dose Route Frequency Provider Last Rate Last Dose    hydrALAZINE (APRESOLINE) tablet 50 mg  50 mg Oral 3 times per day Diego Hill DO   50 mg at 04/02/19 1338    cloNIDine (CATAPRES) tablet 0.2 mg  0.2 mg Oral TID Carlos Conway MD   0.2 mg at 04/02/19 1338    NIFEdipine (PROCARDIA XL) extended release tablet 60 mg  60 mg Oral Daily Carlos Conway MD   60 mg at 04/02/19 0935    oxyCODONE-acetaminophen (PERCOCET) 5-325 MG per tablet 1 tablet  1 tablet Oral Q6H PRN Carlos Conway MD   1 tablet at 04/01/19 1415    heparin (porcine) injection 5,000 Units  5,000 Units Subcutaneous BID Carlos Conway MD   5,000 Units at 04/01/19 2044    hydrALAZINE (APRESOLINE) injection 10 mg  10 mg Intravenous Q6H PRN Carlos Conway MD   10 mg at 04/02/19 0658    insulin lispro (HUMALOG) injection vial 3 Units  3 Units Subcutaneous TID WC Carlos Conway MD   3 Units at 04/01/19 1248    fluticasone (FLONASE) 50 MCG/ACT nasal spray 1 spray  1 spray Each Nare Daily Carlos Conway MD   Stopped at 04/02/19 0933    sodium chloride flush 0.9 % injection 10 mL  10 mL Intravenous 2 times per day Montserrat Walton Yajaira Stevenson MD   10 mL at 04/01/19 2046    sodium chloride flush 0.9 % injection 10 mL  10 mL Intravenous PRN John Gamboa MD        magnesium hydroxide (MILK OF MAGNESIA) 400 MG/5ML suspension 30 mL  30 mL Oral Daily PRN John Gamboa MD   30 mL at 03/31/19 2206    ondansetron (ZOFRAN) injection 4 mg  4 mg Intravenous Q6H PRN John Gamboa MD   4 mg at 04/02/19 0511    insulin glargine (LANTUS) injection vial 10 Units  10 Units Subcutaneous Nightly John Gamboa MD   10 Units at 04/01/19 2045    isosorbide mononitrate (IMDUR) extended release tablet 120 mg  120 mg Oral Daily John Gamboa MD   120 mg at 04/02/19 1380    levothyroxine (SYNTHROID) tablet 150 mcg  150 mcg Oral Daily John Gamboa MD   150 mcg at 04/02/19 0547    metoprolol tartrate (LOPRESSOR) tablet 100 mg  100 mg Oral BID John Gamboa MD   100 mg at 04/02/19 0934    rOPINIRole (REQUIP) tablet 2 mg  2 mg Oral Nightly John Gamboa MD   2 mg at 04/01/19 2045    simvastatin (ZOCOR) tablet 40 mg  40 mg Oral Nightly John Gamboa MD   40 mg at 04/01/19 2045    glucose (GLUTOSE) 40 % oral gel 15 g  15 g Oral PRN John Gamboa MD        dextrose 50 % solution 12.5 g  12.5 g Intravenous PRN John Gamboa MD        glucagon (rDNA) injection 1 mg  1 mg Intramuscular PRN John Gamboa MD        dextrose 5 % solution  100 mL/hr Intravenous ANUPN John Gamboa MD        insulin lispro (HUMALOG) injection vial 0-12 Units  0-12 Units Subcutaneous TID [facility] John Gamboa MD   6 Units at 04/02/19 0804    insulin lispro (HUMALOG) injection vial 0-6 Units  0-6 Units Subcutaneous Nightly John Gamboa MD   2 Units at 04/01/19 2045    acetaminophen (TYLENOL) tablet 650 mg  650 mg Oral Q6H PRN John Gamboa MD   650 mg at 04/02/19 1453       Time Spent on discharge is more than 35 minutes in the examination, evaluation, counseling and review of medications and discharge plan.    Hemal Judd D.O. Internal Medicine Hospitalist    Thank you ROMINA Sahu for the opportunity to be involved in this patient's care.  If you have any questions or concerns please feel free to contact me at [phone]

## 2019-04-02 NOTE — PROGRESS NOTES
Nutrition Assessment    Type and Reason for Visit: Initial    Nutrition Recommendations: continue current POC    Nutrition Assessment: Following for LOS x 6 days. PO intake has improved and is now %. Sending evening snack per pt request.  Not at nutritional risk at this time. Malnutrition Assessment:  · Malnutrition Status: No malnutrition  · Context: Acute illness or injury  · Findings of the 6 clinical characteristics of malnutrition (Minimum of 2 out of 6 clinical characteristics is required to make the diagnosis of moderate or severe Protein Calorie Malnutrition based on AND/ASPEN Guidelines):  1. Energy Intake- ,      2. Weight Loss-No significant weight loss,    3. Fat Loss-No significant subcutaneous fat loss,    4. Muscle Loss-No significant muscle mass loss,    5. Fluid Accumulation-No significant fluid accumulation, Extremities  6.  Strength-Not measured    Nutrition Risk Level: Low    Nutrition Diagnosis:   · Problem: Altered nutrition-related lab values  · Etiology: related to Renal dysfunction     Signs and symptoms:  as evidenced by Lab values    Objective Information:  · Nutrition-Focused Physical Findings: well nourished appearing female--missing front teeth, has plate  · Wound Type: None  · Current Nutrition Therapies:  · Oral Diet Orders: Carb Control 4 Carbs/Meal, Cardiac, Renal(No caffeine)   · Oral Diet intake: %  · Oral Nutrition Supplement (ONS) Orders: None    · Anthropometric Measures:  · Ht: 5' 6\" (167.6 cm)   · Current Body Wt: 161 lb (73 kg)  · Admission Body Wt: 150 lb (68 kg)(stated)  · Ideal Body Wt: 130 lb (59 kg), % Ideal Body 123.85%  · BMI Classification: BMI 25.0 - 29.9 Overweight    Nutrition Interventions:   Continue current diet, Start ONS  Continued Inpatient Monitoring    Nutrition Evaluation:   · Evaluation: Goals set   · Goals: Meet nutritional needs with po intake. Accuchek's under 200.     · Monitoring: Meal Intake, Diet Tolerance, Skin Integrity, I&O, Weight, Pertinent Labs      Electronically signed by Jaimie Domínguez, MS, RD, LD on 4/2/19 at 10:38 AM    Contact Number: [phone]

## 2019-04-02 NOTE — PROGRESS NOTES
Nephrology ([facility]) Progress Note    Patient:  Bia Vernon  YOB: 1970  Date of Service: 4/2/2019  MRN: [id number]   Acct: [de-identified]   Primary Care Physician: ROMINA Rodriguez  Advance Directive: Full Code  Admit Date: 3/27/2019       Hospital Day: 6  Referring Provider: Dayla Mode, DO    Patient independently seen and examined, Chart, Consults, Notes, Operative notes, Labs, Cardiology, and Radiology studies reviewed as able. Subjective:  Bia Vernon is a 47 y.o. female  whom we were consulted for end-stage renal disease. She has end-stage renal disease secondary to diabetic nephropathy and goes to the [facility] on Tuesday, Thursday, and Saturday. She presented  with severe shortness of breath, pulmonary edema, and severe hypertension. Patient was admitted to ICU needing emergent dialysis for correction of pulmonary edema. She was also treated with intravenous Cardene which has been weaned off. She was moved from ICU to the regular floor.      Today, denies high K diet. Tolerating HD so far. No n/v.      Dialysis   Pt was seen on RRT  Modality: Hemodialysis  Access: Arterial Venous Fistula  Location: left upper  QB: 450  QD: 700  UF: 1140              Allergies:  Penicillins;  Lamisil advanced [terbinafine]; and Stadol [butorphanol]    Medicines:  Current Facility-Administered Medications   Medication Dose Route Frequency Provider Last Rate Last Dose    hydrALAZINE (APRESOLINE) tablet 50 mg  50 mg Oral 3 times per day Dayla Mode, DO   50 mg at 04/02/19 0547    cloNIDine (CATAPRES) tablet 0.2 mg  0.2 mg Oral TID Qasim Fischer MD   Stopped at 04/01/19 2031    NIFEdipine (PROCARDIA XL) extended release tablet 60 mg  60 mg Oral Daily Qasim Fischer MD   Stopped at 04/02/19 0935    oxyCODONE-acetaminophen (PERCOCET) 5-325 MG per tablet 1 tablet  1 tablet Oral Q6H PRN Qasim Fischer MD   1 tablet at 04/01/19 1415    heparin (porcine) injection 5,000 Units  5,000 Units Subcutaneous BID Kinsey Henley MD   5,000 Units at 04/01/19 2044    hydrALAZINE (APRESOLINE) injection 10 mg  10 mg Intravenous Q6H PRN Kinsey Henley MD   10 mg at 04/02/19 0658    insulin lispro (HUMALOG) injection vial 3 Units  3 Units Subcutaneous TID WC Kinsey Helney MD   3 Units at 04/01/19 1248    fluticasone (FLONASE) 50 MCG/ACT nasal spray 1 spray  1 spray Each Nare Daily Kinsey Henley MD   Stopped at 04/02/19 0933    sodium chloride flush 0.9 % injection 10 mL  10 mL Intravenous 2 times per day Kinsey Henley MD   10 mL at 04/01/19 2046    sodium chloride flush 0.9 % injection 10 mL  10 mL Intravenous PRN Kinsey Henley MD        magnesium hydroxide (MILK OF MAGNESIA) 400 MG/5ML suspension 30 mL  30 mL Oral Daily PRN Kinsey Henley MD   30 mL at 03/31/19 2206    ondansetron (ZOFRAN) injection 4 mg  4 mg Intravenous Q6H PRN Kinsey Henley MD   4 mg at 04/02/19 0511    insulin glargine (LANTUS) injection vial 10 Units  10 Units Subcutaneous Nightly Kinsey Henley MD   10 Units at 04/01/19 2045    isosorbide mononitrate (IMDUR) extended release tablet 120 mg  120 mg Oral Daily Kinsey Henley MD   Stopped at 04/02/19 3781    levothyroxine (SYNTHROID) tablet 150 mcg  150 mcg Oral Daily Kinsey Henley MD   150 mcg at 04/02/19 0547    metoprolol tartrate (LOPRESSOR) tablet 100 mg  100 mg Oral BID Kinsey Henley MD   Stopped at 04/01/19 2031    rOPINIRole (REQUIP) tablet 2 mg  2 mg Oral Nightly Kinsey Henley MD   2 mg at 04/01/19 2045    simvastatin (ZOCOR) tablet 40 mg  40 mg Oral Nightly Kinsey Henley MD   40 mg at 04/01/19 2045    glucose (GLUTOSE) 40 % oral gel 15 g  15 g Oral PRN Kinsey Henley MD        dextrose 50 % solution 12.5 g  12.5 g Intravenous PRN Kinsey Henley MD        glucagon (rDNA) injection 1 mg  1 mg Intramuscular PRN Kinsey Henley MD        dextrose 5 % solution  100 mL/hr Intravenous PRN Anabell Denson MD        insulin lispro (HUMALOG) injection vial 0-12 Units  0-12 Units Subcutaneous TID [facility] Anabell Denson MD   6 Units at 04/02/19 0804    insulin lispro (HUMALOG) injection vial 0-6 Units  0-6 Units Subcutaneous Nightly Anabell Denson MD   2 Units at 04/01/19 2045    acetaminophen (TYLENOL) tablet 650 mg  650 mg Oral Q6H PRN Anabell Denson MD   650 mg at 03/28/19 1946       Past Medical History:  Past Medical History:   Diagnosis Date    Arthritis     [facility] kidney [facility], stage 5[facility] failure [address]     DM (diabetes mellitus) type II controlled with renal manifestation ([address] 06/1993    Gastroparesis     GERD (gastroesophageal reflux disease)     Hemodialysis patient ([address]     dialysis on tues, thur, and sat at [facility]    Hypertension     Hypothyroid     Nasal congestion     recent    Restless leg syndrome        Past Surgical History:  Past Surgical History:   Procedure Laterality Date    BREAST SURGERY      infected milk gland removed    CHOLECYSTECTOMY      COLONOSCOPY      DIALYSIS FISTULA CREATION Left 1/25/2019    REVISION LEFT UPPER EXTREMITY AV ACCESS WITH LEFT BRACHIAL ARTERY TO LEFT AXILLARY VEIN WITH  INTERPOSITIONAL ARTEGRAFT   performed by Oz Champion MD at [address]  2012    GALLBLADDER SURGERY      HC DIALYSIS CATHETER Right 1/25/2019    INSERTION OF RIGHT INTERNAL JUGULAR HEMODIALYSIS CATHETER performed by Oz Champion MD at [address]      pt denies hyst; states ablation    DC COLONOSCOPY W/BIOPSY SINGLE/MULTIPLE N/A 10/20/2017    Dr Nasreen Morel prep-Tubular AP (-) dysplasia x 2--3 yr recall    DC EGD TRANSORAL BIOPSY SINGLE/MULTIPLE N/A 12/27/2016    Dr Marco Villaseñor    DC EGD TRANSORAL BIOPSY SINGLE/MULTIPLE N/A 10/20/2017    Dr Olinda Rizvi (-)    TUBAL LIGATION      VASCULAR SURGERY Left 2016    fistula by Dr Eamon Hartman at [address] 10/02/2017    [facility]. Left upper fistulograms/venograms, balloon angioplasty cephalic vein arch 40I43 conquest.    VASCULAR SURGERY  2018    FISTULOGRAM    VASCULAR SURGERY  10/29/2018    [facility]. Left upper fistulograms/venograms    VASCULAR SURGERY  2018    [facility]. Arch aortogram,left upper arteriograms.  VASCULAR SURGERY  2019    [facility]. Removal of tunneled dialyis catheter right internal jugular vein.        Family History  Family History   Problem Relation Age of Onset    Colon Cancer Mother          at 63    Colon Polyps Mother     Lung Cancer Father          at 66    Colon Polyps Father     Stomach Cancer Sister     Breast Cancer Sister     Depression Daughter 25        committed suicide    Liver Cancer Neg Hx     Liver Disease Neg Hx     Esophageal Cancer Neg Hx     Rectal Cancer Neg Hx        Social History  Social History     Socioeconomic History    Marital status:      Spouse name: Not on file    Number of children: 2    Years of education: Not on file    Highest education level: Not on file   Occupational History    Not on file   Social Needs    Financial resource strain: Not on file    Food insecurity:     Worry: Not on file     Inability: Not on file    Transportation needs:     Medical: Not on file     Non-medical: Not on file   Tobacco Use    Smoking status: Current Every Day Smoker     Packs/day: 0.50     Years: 25.00     Pack years: 12.50     Types: Cigarettes     Last attempt to quit: 2017     Years since quittin.8    Smokeless tobacco: Never Used   Substance and Sexual Activity    Alcohol use: No    Drug use: No    Sexual activity: Not Currently     Partners: Male   Lifestyle    Physical activity:     Days per week: Not on file     Minutes per session: Not on file    Stress: Not on file   Relationships    Social connections:     Talks on phone: Not on file     Gets together: Not on file     Attends Rastafarian service: Not on file     Active member of club or organization: Not on file     Attends meetings of clubs or organizations: Not on file     Relationship status: Not on file    Intimate partner violence:     Fear of current or ex partner: Not on file     Emotionally abused: Not on file     Physically abused: Not on file     Forced sexual activity: Not on file   Other Topics Concern    Not on file   Social History Narrative    Not on file         Review of Systems:  History obtained from chart review and the patient  General ROS: No fever or chills  Respiratory ROS: No cough, shortness of breath, wheezing  Cardiovascular ROS: No chest pain or palpitations  Gastrointestinal ROS: No abdominal pain or melena  Genito-Urinary ROS: No dysuria or hematuria  Musculoskeletal ROS: No joint pain or swelling         Objective:  Patient Vitals for the past 24 hrs:   BP Temp Temp src Pulse Resp SpO2 Weight   04/02/19 0631 (!) 172/68 98.2 °F (36.8 °C) Temporal 53 18 95 % --   04/02/19 0403 (!) 157/69 98.1 °F (36.7 °C) Temporal 53 18 98 % 161 lb (73 kg)   04/01/19 2216 117/64 -- -- 51 18 98 % --   04/01/19 2015 (!) 108/58 -- -- 54 -- -- --   04/01/19 1951 -- 97.8 °F (36.6 °C) -- 51 18 -- --   04/01/19 1950 (!) 107/58 97.8 °F (36.6 °C) Temporal 50 16 99 % --   04/01/19 1352 122/62 98.5 °F (36.9 °C) Temporal 52 16 99 % --       Intake/Output Summary (Last 24 hours) at 4/2/2019 1145  Last data filed at 4/2/2019 0917  Gross per 24 hour   Intake 475 ml   Output --   Net 475 ml     General: awake/alert   Chest:  clear to auscultation bilaterally without respiratory distress  CVS: regular rate and rhythm  Abdominal: soft, nontender, normal bowel sounds  Extremities: no cyanosis or edema  Skin: warm and dry without rash    Labs:  BMP:   Recent Labs     03/31/19  0327 04/01/19  0325 04/01/19  0723 04/02/19  0354   * 126*  --  126*   K 5.1* 6.0* 5.3* 6.4*   CL 92* 90*  --  89*   CO2 24 23  --  22   BUN 32* 54*  --  72*   CREATININE 4.6* 5.9*  --  7.4*   CALCIUM 8.9 abnormalities. Development of interstitial pulmonary edema compared to 3 days prior. Signed by Dr Mariana Carmona on 3/27/2019 12:08 PM    Xr Chest Portable    Result Date: 3/24/2019  History: 79-year-old with shortness of breath. Reference: Chest radiograph January 2019. Findings: Frontal chest radiograph performed. Mild cardiomegaly. The lungs are clear. I suspect tiny left pleural effusion. There is a left subclavian vascular stent. No acute osseous findings. Suspect tiny left pleural effusion. No overt edema/overload. Signed by Dr Mariana Carmona on 3/24/2019 7:51 PM    Cta Pulmonary W Contrast    Result Date: 3/27/2019  CTA PULMONARY W CONTRAST 3/27/2019 10:45 AM HISTORY: Dyspnea, clinical concerns for pulmonary embolus Comparison: 2/11/2019 chest CT Technique: CT evaluation of the chest was performed with intravenous contrast. 2 mm transaxial images were obtained. Maximum intensity projection reconstruction angiographic images were generated . Two-dimensional Coronal and sagittal reconstruction images of the pulmonary arteries, aorta and great vessels were also generated. .Radiation dose equals  mGy cm. AUTOMATED EXPOSURE CONTROL dose reduction technique was implemented Findings: There are small to moderate-sized bilateral pleural effusions, layering posteriorly. There is reticular nodular interstitial changes diffusely in the lungs with peribronchial cuffing. There are patchy groundglass opacities in the upper lobes with more confluent groundglass opacities in the lower lobes. There is cardiomegaly with panchamber enlargement. There is diffuse body wall edema with anasarca change. Differential considerations include pulmonary edema/congestive heart failure-volume overload. The mediastinum appears edematous with diffuse low-attenuation changes without definite lymphadenopathy. Peribronchial thickening noted in the hilar regions without significant lymph node enlargement.  Calcified left hilar lymph nodes

## 2019-04-02 NOTE — PROGRESS NOTES
[facility] Neurology  [address] [place] [address] Drive, [address]  [place], [place] [address]  Phone [phone]     Neurology Progress Note  2019 9:26 AM  Information:   Patient Name: Elliot Montalvo  :   1970  Age:   47 y.o. MRN:   [id number]  Account #:  [de-identified]  Admit Date:   3/27/2019  Today:  19     ADMIT DX:   Volume overload    Subjective:     Elliot Montalvo is a 47y.o. year old woman with a history of diabetes, ESRD, on hemodialysis who was admitted 3/27 with fluid overload and hyperkalemia and had a spell yesterday with headache and visual impairment. Interval History:   No further headache. She says she still cannot see off to the right. She does relate a history of diabetic retinopathy and has had to have injections in the past.  Her ophthalmologist is Dr. Tulio Du. She complains of generalized weakness and gets lightheaded when she stands.     Objective:     Past Medical History:  Past Medical History:   Diagnosis Date    Arthritis     Chronic kidney disease, stage 5, [facility] failure [address]     DM (diabetes mellitus) type II controlled with renal manifestation ([facility] [facility] 1993    Gastroparesis     GERD (gastroesophageal reflux disease)     Hemodialysis patient ([facility] [facility]     dialysis on , and sat at [facility]    Hypertension     Hypothyroid     Nasal congestion     recent    Restless leg syndrome        Past Surgical History:   Procedure Laterality Date    BREAST SURGERY      infected milk gland removed    CHOLECYSTECTOMY      COLONOSCOPY      DIALYSIS FISTULA CREATION Left 2019    REVISION LEFT UPPER EXTREMITY AV ACCESS WITH LEFT BRACHIAL ARTERY TO LEFT AXILLARY VEIN WITH  INTERPOSITIONAL ARTEGRAFT   performed by Jayla Barney MD at [address]      GALLBLADDER SURGERY      [address] Right 2019    INSERTION OF RIGHT INTERNAL JUGULAR HEMODIALYSIS CATHETER performed by [facility]la [facility]ney MD at [facility] denies hyst; states ablation    TN COLONOSCOPY W/BIOPSY SINGLE/MULTIPLE N/A 10/20/2017    Dr Dieter Quiñonez prep-Tubular AP (-) dysplasia x 2--3 yr recall    TN EGD TRANSORAL BIOPSY SINGLE/MULTIPLE N/A 2016    Dr Shala Joe EGD TRANSORAL BIOPSY SINGLE/MULTIPLE N/A 10/20/2017    Dr Hinton Dust (-)    TUBAL LIGATION      VASCULAR SURGERY Left     fistula by Dr Mary Ellen Porter at [address]  10/02/2017    [facility]. Left upper fistulograms/venograms, balloon angioplasty cephalic vein arch 28J56 conquest.    VASCULAR SURGERY  2018    FISTULOGRAM    VASCULAR SURGERY  10/29/2018    [facility]. Left upper fistulograms/venograms    VASCULAR SURGERY  2018    [facility]. Arch aortogram,left upper arteriograms.  VASCULAR SURGERY  2019    [facility]. Removal of tunneled dialyis catheter right internal jugular vein.        Family History   Problem Relation Age of Onset    Colon Cancer Mother          at 63    Colon Polyps Mother     Lung Cancer Father          at 66    Colon Polyps Father     Stomach Cancer Sister     Breast Cancer Sister     Depression Daughter 25        committed suicide    Liver Cancer Neg Hx     Liver Disease Neg Hx     Esophageal Cancer Neg Hx     Rectal Cancer Neg Hx        Social History     Socioeconomic History    Marital status:      Spouse name: Not on file    Number of children: 2    Years of education: Not on file    Highest education level: Not on file   Occupational History    Not on file   Social Needs    Financial resource strain: Not on file    Food insecurity:     Worry: Not on file     Inability: Not on file    Transportation needs:     Medical: Not on file     Non-medical: Not on file   Tobacco Use    Smoking status: Current Every Day Smoker     Packs/day: 0.50     Years: 25.00     Pack years: 12.50     Types: Cigarettes     Last attempt to quit: 2017     Years since quittin.8    Smokeless tobacco: Never Used   Substance and Sexual Activity    Glucose 212 (H) 70 - 99 mg/dl    Performed on [employer]    CBC Auto Differential    Collection Time: 04/02/19  3:54 AM   Result Value Ref Range    WBC 4.5 (L) 4.8 - 10.8 K/uL    RBC 3.34 (L) 4.20 - 5.40 M/uL    Hemoglobin 10.0 (L) 12.0 - 16.0 g/dL    Hematocrit 31.7 (L) 37.0 - 47.0 %    MCV 94.9 81.0 - 99.0 fL    MCH 29.9 27.0 - 31.0 pg    MCHC 31.5 (L) 33.0 - 37.0 g/dL    RDW 13.5 11.5 - 14.5 %    Platelets 704 (L) 766 - 400 K/uL    MPV 10.9 9.4 - 12.3 fL    Neutrophils % 57.2 50.0 - 65.0 %    Lymphocytes % 28.6 20.0 - 40.0 %    Monocytes % 6.9 0.0 - 10.0 %    Eosinophils % 5.1 (H) 0.0 - 5.0 %    Basophils % 2.0 (H) 0.0 - 1.0 %    Neutrophils # 2.6 1.5 - 7.5 K/uL    Lymphocytes # 1.3 1.1 - 4.5 K/uL    Monocytes # 0.30 0.00 - 0.90 K/uL    Eosinophils # 0.20 0.00 - 0.60 K/uL    Basophils # 0.10 0.00 - 0.20 K/uL   Basic Metabolic Panel w/ Reflex to MG    Collection Time: 04/02/19  3:54 AM   Result Value Ref Range    Sodium 126 (L) 136 - 145 mmol/L    Potassium reflex Magnesium 6.4 (HH) 3.5 - 5.0 mmol/L    Chloride 89 (L) 98 - 111 mmol/L    CO2 22 22 - 29 mmol/L    Anion Gap 15 7 - 19 mmol/L    Glucose 234 (H) 74 - 109 mg/dL    BUN 72 (H) 6 - 20 mg/dL    CREATININE 7.4 (H) 0.5 - 0.9 mg/dL    GFR Non- 6 (A) >60    Calcium 8.6 8.6 - 10.0 mg/dL   POCT Glucose    Collection Time: 04/02/19  6:31 AM   Result Value Ref Range    POC Glucose 283 (H) 70 - 99 mg/dl    Performed on [employer]      Narrative   MRI BRAIN WO CONTRAST 4/1/2019 12:15 PM   HISTORY: Stroke symptoms. Left eye visual changes and generalized   weakness. Comparison: None.     Technique: Multiplanar imaging of the brain was performed in a routine   fashion. Findings:    Midline structures are nondisplaced. Ventricular system is normal.   There is no significant mass effect or hydrocephalus. Basilar cisterns   are preserved.  Mild small vessel ischemic changes are present with   increased T2 signal involving the periventricular and higher white   matter tracts, particularly in the region of the atrial trigone. . No   abnormal extra axial fluid collections are noted. No restriction of   diffusion is present. Proximal cervical spinal cord, brainstem, and cerebellum are   unremarkable. Normal cerebrovascular flow voids are seen. Bilateral   globes and orbits are normal in appearance. No abnormal signal is noted in the mastoid air cells or paranasal   sinuses.        Impression   Impression:    1. There is no restricted diffusion to suggest acute ischemia or   infarct. There is no mass effect or shift of the midline. 2. Mild small vessel ischemic change.     Signed by Dr Shyam Negron on 4/1/2019 5:40 PM     Narrative   Vascular Carotid Procedure        Demographics        Patient Name     MALIKA OLIVEIRA Age                    49        Patient Number   [id number]         Gender                 Female        Visit Number     [id number]      Interpreting Physician Derick Allred MD        Date of Birth    1970     Referring Physician   Pat Luther        Accession Number [id number]      Sonographer            ELZA Hayes       Procedure   Type of Study:        Cerebral:Carotid, VL CAROTID BILATERAL.       Indications for Study:Stroke like symptoms and Bruit.       Risk Factors         - The patient's risk factor(s) include: diabetes mellitus and arterial       hypertension.         - The patient has a current/recent (within 1 year) tobacco history.        Impression        There is smooth plaque visualized in the right internal carotid    artery(ies).    There is no stenosis in the right internal carotid artery.   Astrid Last is heterogeneous plaque visualized in the left internal carotid    artery(ies).    There is less than 50% stenosis of the left internal carotid artery.    There is normal antegrade flow in the bilateral vertebral artery(ies).        Signature        ----------------------------------------------------------------  Electronically signed by Shemar Jamil MD(Interpreting    physician) on 04/02/2019 07:06 AM       Diet:  DIET CARDIAC; Carb Control: 4 carb choices (60 gms)/meal; No Caffeine, Renal    Examination:  Vitals: BP (!) 172/68   Pulse 53   Temp 98.2 °F (36.8 °C) (Temporal)   Resp 18   Ht 5' 6\" (1.676 m)   Wt 161 lb (73 kg)   SpO2 95%   BMI 25.99 kg/m²      Intake/Output Summary (Last 24 hours) at 4/2/2019 0926  Last data filed at 4/2/2019 6532  Gross per 24 hour   Intake 725 ml   Output --   Net 725 ml     General appearance: She is a chronically ill appearing woman who is in no distress  Skin: Skin color, texture, turgor normal. No rashes or lesions  HEENT: Head: Normal, normocephalic, atraumatic. Neck:no adenopathy, no JVD, supple, symmetrical, trachea midline and thyroid not enlarged, symmetric, no tenderness/mass/nodules  Lungs: clear to auscultation bilaterally  Heart: regular rate and rhythm with systolic murmer that radiates to both carotid arteries. Abdomen: soft, non-tender; bowel sounds normal; no masses,  no organomegaly  Extremities: extremities normal, atraumatic, no cyanosis or edema  Neurologic:  Alert, oriented. No dysarthria. Speech is fluent. EOMI, PERRL. VFs show some impairment in left field of both eyes. No facial weakness. Limb strength is normal.  No pronator drift. Rapid alternating movements are normal.  Finger to nose testing shows no dysmetria. Assessment:   1. Subjective left visual field impairment  2. Headache  3. Hyponatremia, hyperkalemia  4. ESRD  5. DM  6. Anemia and mild thrombocytopenia  Nothing objective to indicate stroke    Plan:   1. ASA 81 mg chris  2. FU with Dr. Dimitry Diaz for VF testing and dilated eye examination  3. Signing off. Neuro FU PRN       Discharge planning:   Home    Reviewed treatment plans with the patient and/or family. 25 minutes spent in face to face interaction and coordination of care.

## 2019-04-02 NOTE — PLAN OF CARE
Problem: Falls - Risk of:  Goal: Will remain free from falls  Description  Will remain free from falls  4/2/2019 0037 by Shawnee Schlatter, RN  Outcome: Ongoing  4/1/2019 1826 by Gage Davidson RN  Outcome: Ongoing  Goal: Absence of physical injury  Description  Absence of physical injury  4/2/2019 0037 by Shawnee Schlatter, RN  Outcome: Ongoing  4/1/2019 1826 by Gage Davidson RN  Outcome: Ongoing     Problem: Pain:  Goal: Pain level will decrease  Description  Pain level will decrease  4/2/2019 0037 by Shawnee Schlatter, RN  Outcome: Ongoing  4/1/2019 1826 by Gage Davidson RN  Outcome: Ongoing  Goal: Control of acute pain  Description  Control of acute pain  4/2/2019 0037 by Shawnee Schlatter, RN  Outcome: Ongoing  4/1/2019 1826 by Gage Davidson RN  Outcome: Ongoing  Goal: Control of chronic pain  Description  Control of chronic pain  4/2/2019 0037 by Shawnee Schlatter, RN  Outcome: Ongoing  4/1/2019 1826 by Gage Davidson RN  Outcome: Ongoing     Problem: Fluid Volume:  Goal: Hemodynamic stability will improve  Description  Hemodynamic stability will improve  4/2/2019 0037 by Shawnee Schlatter, RN  Outcome: Ongoing  4/1/2019 1826 by Gage Davidson RN  Outcome: Ongoing  Goal: Ability to maintain a balanced intake and output will improve  Description  Ability to maintain a balanced intake and output will improve  4/2/2019 0037 by Shawnee Schlatter, RN  Outcome: Ongoing  4/1/2019 1826 by Gage Davidson RN  Outcome: Ongoing     Problem: Respiratory:  Goal: Respiratory status will improve  Description  Respiratory status will improve  4/2/2019 0037 by Shawnee Schlatter, RN  Outcome: Ongoing  4/1/2019 1826 by Gage Davidson RN  Outcome: Ongoing   Electronically signed by Shawnee Schlatter, RN on 4/2/2019 at 12:38 AM

## 2019-04-19 ENCOUNTER — APPOINTMENT (OUTPATIENT)
Dept: GENERAL RADIOLOGY | Age: 49
DRG: 077 | End: 2019-04-19
Payer: MEDICARE

## 2019-04-19 ENCOUNTER — HOSPITAL ENCOUNTER (INPATIENT)
Age: 49
LOS: 3 days | Discharge: HOME | DRG: 077 | End: 2019-04-23
Attending: EMERGENCY MEDICINE | Admitting: FAMILY MEDICINE
Payer: MEDICARE

## 2019-04-19 DIAGNOSIS — E87.20 LACTIC ACIDOSIS: ICD-10-CM

## 2019-04-19 DIAGNOSIS — R40.0 SOMNOLENCE: Primary | ICD-10-CM

## 2019-04-19 DIAGNOSIS — R10.84 GENERALIZED ABDOMINAL PAIN: ICD-10-CM

## 2019-04-19 DIAGNOSIS — R73.9 HYPERGLYCEMIA: ICD-10-CM

## 2019-04-19 DIAGNOSIS — Z99.2 STAGE 5 CHRONIC KIDNEY DISEASE ON CHRONIC DIALYSIS (HCC): ICD-10-CM

## 2019-04-19 DIAGNOSIS — K56.41 FECAL IMPACTION IN RECTUM (HCC): ICD-10-CM

## 2019-04-19 DIAGNOSIS — I16.1 HYPERTENSIVE EMERGENCY: ICD-10-CM

## 2019-04-19 DIAGNOSIS — N18.6 STAGE 5 CHRONIC KIDNEY DISEASE ON CHRONIC DIALYSIS (HCC): ICD-10-CM

## 2019-04-19 LAB
ALBUMIN SERPL-MCNC: 4.4 G/DL (ref 3.5–5.2)
ALP BLD-CCNC: 154 U/L (ref 35–104)
ALT SERPL-CCNC: 15 U/L (ref 5–33)
AMMONIA: 23 UMOL/L (ref 11–51)
ANION GAP SERPL CALCULATED.3IONS-SCNC: 25 MMOL/L (ref 7–19)
AST SERPL-CCNC: 21 U/L (ref 5–32)
BASE EXCESS ARTERIAL: 2.9 MMOL/L (ref -2–2)
BASOPHILS ABSOLUTE: 0.1 K/UL (ref 0–0.2)
BASOPHILS RELATIVE PERCENT: 1.5 % (ref 0–1)
BILIRUB SERPL-MCNC: 0.6 MG/DL (ref 0.2–1.2)
BUN BLDV-MCNC: 41 MG/DL (ref 6–20)
CALCIUM SERPL-MCNC: 9.8 MG/DL (ref 8.6–10)
CARBOXYHEMOGLOBIN ARTERIAL: 1.2 % (ref 0–5)
CHLORIDE BLD-SCNC: 85 MMOL/L (ref 98–111)
CO2: 22 MMOL/L (ref 22–29)
CREAT SERPL-MCNC: 5.1 MG/DL (ref 0.5–0.9)
EOSINOPHILS ABSOLUTE: 0.1 K/UL (ref 0–0.6)
EOSINOPHILS RELATIVE PERCENT: 1.8 % (ref 0–5)
ETHANOL: <10 MG/DL (ref 0–0.08)
GFR NON-AFRICAN AMERICAN: 9
GLUCOSE BLD-MCNC: 331 MG/DL (ref 74–109)
HCO3 ARTERIAL: 21.4 MMOL/L (ref 22–26)
HCT VFR BLD CALC: 39.2 % (ref 37–47)
HEMOGLOBIN, ART, EXTENDED: 13.3 G/DL (ref 12–16)
HEMOGLOBIN: 12.9 G/DL (ref 12–16)
LACTIC ACID: 3.7 MMOL/L (ref 0.5–1.9)
LIPASE: 48 U/L (ref 13–60)
LYMPHOCYTES ABSOLUTE: 1.5 K/UL (ref 1.1–4.5)
LYMPHOCYTES RELATIVE PERCENT: 22 % (ref 20–40)
MCH RBC QN AUTO: 30.9 PG (ref 27–31)
MCHC RBC AUTO-ENTMCNC: 32.9 G/DL (ref 33–37)
MCV RBC AUTO: 93.8 FL (ref 81–99)
METHEMOGLOBIN ARTERIAL: 1 %
MONOCYTES ABSOLUTE: 0.7 K/UL (ref 0–0.9)
MONOCYTES RELATIVE PERCENT: 10.5 % (ref 0–10)
NEUTROPHILS ABSOLUTE: 4.3 K/UL (ref 1.5–7.5)
NEUTROPHILS RELATIVE PERCENT: 63.9 % (ref 50–65)
O2 CONTENT ARTERIAL: 17.7 ML/DL
O2 SAT, ARTERIAL: 94.6 %
O2 THERAPY: ABNORMAL
PCO2 ARTERIAL: 19 MMHG (ref 35–45)
PDW BLD-RTO: 13.6 % (ref 11.5–14.5)
PH ARTERIAL: 7.66 (ref 7.35–7.45)
PLATELET # BLD: 176 K/UL (ref 130–400)
PMV BLD AUTO: 10.3 FL (ref 9.4–12.3)
PO2 ARTERIAL: 64 MMHG (ref 80–100)
POTASSIUM SERPL-SCNC: 4.5 MMOL/L (ref 3.5–5)
POTASSIUM, WHOLE BLOOD: 4.2
RBC # BLD: 4.18 M/UL (ref 4.2–5.4)
SODIUM BLD-SCNC: 132 MMOL/L (ref 136–145)
TOTAL PROTEIN: 7.4 G/DL (ref 6.6–8.7)
WBC # BLD: 6.7 K/UL (ref 4.8–10.8)

## 2019-04-19 PROCEDURE — 2580000003 HC RX 258: Performed by: EMERGENCY MEDICINE

## 2019-04-19 PROCEDURE — 36600 WITHDRAWAL OF ARTERIAL BLOOD: CPT

## 2019-04-19 PROCEDURE — 93005 ELECTROCARDIOGRAM TRACING: CPT

## 2019-04-19 PROCEDURE — 99285 EMERGENCY DEPT VISIT HI MDM: CPT

## 2019-04-19 PROCEDURE — 71045 X-RAY EXAM CHEST 1 VIEW: CPT

## 2019-04-19 RX ORDER — SODIUM CHLORIDE, SODIUM LACTATE, POTASSIUM CHLORIDE, AND CALCIUM CHLORIDE .6; .31; .03; .02 G/100ML; G/100ML; G/100ML; G/100ML
1000 INJECTION, SOLUTION INTRAVENOUS ONCE
Status: COMPLETED | OUTPATIENT
Start: 2019-04-19 | End: 2019-04-20

## 2019-04-19 RX ADMIN — SODIUM CHLORIDE, POTASSIUM CHLORIDE, SODIUM LACTATE AND CALCIUM CHLORIDE 1000 ML: 600; 310; 30; 20 INJECTION, SOLUTION INTRAVENOUS at 23:55

## 2019-04-20 ENCOUNTER — APPOINTMENT (OUTPATIENT)
Dept: CT IMAGING | Age: 49
DRG: 077 | End: 2019-04-20
Payer: MEDICARE

## 2019-04-20 PROBLEM — R41.0 DELIRIUM: Status: ACTIVE | Noted: 2019-04-20

## 2019-04-20 PROBLEM — E11.10 DIABETIC KETOACIDOSIS WITHOUT COMA ASSOCIATED WITH TYPE 2 DIABETES MELLITUS (HCC): Status: ACTIVE | Noted: 2019-04-20

## 2019-04-20 PROBLEM — I10 ACCELERATED HYPERTENSION: Status: ACTIVE | Noted: 2019-04-20

## 2019-04-20 PROBLEM — I67.4 HYPERTENSIVE ENCEPHALOPATHY: Status: ACTIVE | Noted: 2019-04-20

## 2019-04-20 PROBLEM — Z91.199 MEDICALLY NONCOMPLIANT: Chronic | Status: ACTIVE | Noted: 2019-04-20

## 2019-04-20 LAB
ACETONE BLOOD: ABNORMAL
AMPHETAMINE SCREEN, URINE: NEGATIVE
ANION GAP SERPL CALCULATED.3IONS-SCNC: 15 MMOL/L (ref 7–19)
ANION GAP SERPL CALCULATED.3IONS-SCNC: 17 MMOL/L (ref 7–19)
BACTERIA: NEGATIVE /HPF
BARBITURATE SCREEN URINE: NEGATIVE
BENZODIAZEPINE SCREEN, URINE: NEGATIVE
BILIRUBIN URINE: NEGATIVE
BLOOD, URINE: ABNORMAL
BUN BLDV-MCNC: 18 MG/DL (ref 6–20)
BUN BLDV-MCNC: 49 MG/DL (ref 6–20)
CALCIUM SERPL-MCNC: 10 MG/DL (ref 8.6–10)
CALCIUM SERPL-MCNC: 9.1 MG/DL (ref 8.6–10)
CANNABINOID SCREEN URINE: NEGATIVE
CHLORIDE BLD-SCNC: 86 MMOL/L (ref 98–111)
CHLORIDE BLD-SCNC: 93 MMOL/L (ref 98–111)
CLARITY: CLEAR
CO2: 26 MMOL/L (ref 22–29)
CO2: 27 MMOL/L (ref 22–29)
COCAINE METABOLITE SCREEN URINE: NEGATIVE
COLOR: YELLOW
CREAT SERPL-MCNC: 3.1 MG/DL (ref 0.5–0.9)
CREAT SERPL-MCNC: 6.5 MG/DL (ref 0.5–0.9)
EPITHELIAL CELLS, UA: 1 /HPF (ref 0–5)
GFR NON-AFRICAN AMERICAN: 16
GFR NON-AFRICAN AMERICAN: 7
GLUCOSE BLD-MCNC: 193 MG/DL (ref 70–99)
GLUCOSE BLD-MCNC: 215 MG/DL (ref 70–99)
GLUCOSE BLD-MCNC: 215 MG/DL (ref 70–99)
GLUCOSE BLD-MCNC: 260 MG/DL (ref 74–109)
GLUCOSE BLD-MCNC: 291 MG/DL (ref 70–99)
GLUCOSE BLD-MCNC: 293 MG/DL (ref 70–99)
GLUCOSE BLD-MCNC: 321 MG/DL (ref 74–109)
GLUCOSE BLD-MCNC: 388 MG/DL (ref 70–99)
GLUCOSE BLD-MCNC: 409 MG/DL (ref 70–99)
GLUCOSE BLD-MCNC: 436 MG/DL (ref 70–99)
GLUCOSE BLD-MCNC: 508 MG/DL (ref 70–99)
GLUCOSE BLD-MCNC: 537 MG/DL (ref 70–99)
GLUCOSE BLD-MCNC: 545 MG/DL (ref 70–99)
GLUCOSE URINE: 250 MG/DL
HBA1C MFR BLD: 10.7 % (ref 4–6)
HYALINE CASTS: 1 /HPF (ref 0–8)
KETONES, URINE: NEGATIVE MG/DL
LACTIC ACID: 2.3 MMOL/L (ref 0.5–1.9)
LACTIC ACID: 3 MMOL/L (ref 0.5–1.9)
LEUKOCYTE ESTERASE, URINE: NEGATIVE
Lab: NORMAL
MAGNESIUM: 2 MG/DL (ref 1.6–2.6)
MAGNESIUM: 2.2 MG/DL (ref 1.6–2.6)
NITRITE, URINE: NEGATIVE
OPIATE SCREEN URINE: NEGATIVE
PERFORMED ON: ABNORMAL
PH UA: 8 (ref 5–8)
PHOSPHORUS: 3.1 MG/DL (ref 2.5–4.5)
POTASSIUM SERPL-SCNC: 3.9 MMOL/L (ref 3.5–5)
POTASSIUM SERPL-SCNC: 4.2 MMOL/L (ref 3.5–5)
PROTEIN UA: 300 MG/DL
RBC UA: 10 /HPF (ref 0–4)
SODIUM BLD-SCNC: 128 MMOL/L (ref 136–145)
SODIUM BLD-SCNC: 136 MMOL/L (ref 136–145)
SPECIFIC GRAVITY UA: 1.01 (ref 1–1.03)
TOTAL CK: 39 U/L (ref 26–192)
TROPONIN: 0.02 NG/ML (ref 0–0.03)
URINE REFLEX TO CULTURE: YES
UROBILINOGEN, URINE: 0.2 E.U./DL
WBC UA: 1 /HPF (ref 0–5)

## 2019-04-20 PROCEDURE — 81001 URINALYSIS AUTO W/SCOPE: CPT

## 2019-04-20 PROCEDURE — 74176 CT ABD & PELVIS W/O CONTRAST: CPT

## 2019-04-20 PROCEDURE — 2500000003 HC RX 250 WO HCPCS: Performed by: EMERGENCY MEDICINE

## 2019-04-20 PROCEDURE — 84100 ASSAY OF PHOSPHORUS: CPT

## 2019-04-20 PROCEDURE — 83036 HEMOGLOBIN GLYCOSYLATED A1C: CPT

## 2019-04-20 PROCEDURE — 6360000002 HC RX W HCPCS: Performed by: EMERGENCY MEDICINE

## 2019-04-20 PROCEDURE — 36415 COLL VENOUS BLD VENIPUNCTURE: CPT

## 2019-04-20 PROCEDURE — 2580000003 HC RX 258: Performed by: INTERNAL MEDICINE

## 2019-04-20 PROCEDURE — 80307 DRUG TEST PRSMV CHEM ANLYZR: CPT

## 2019-04-20 PROCEDURE — 96375 TX/PRO/DX INJ NEW DRUG ADDON: CPT

## 2019-04-20 PROCEDURE — 2580000003 HC RX 258: Performed by: EMERGENCY MEDICINE

## 2019-04-20 PROCEDURE — 2700000000 HC OXYGEN THERAPY PER DAY

## 2019-04-20 PROCEDURE — 6360000002 HC RX W HCPCS: Performed by: INTERNAL MEDICINE

## 2019-04-20 PROCEDURE — 2500000003 HC RX 250 WO HCPCS: Performed by: HOSPITALIST

## 2019-04-20 PROCEDURE — 6370000000 HC RX 637 (ALT 250 FOR IP): Performed by: EMERGENCY MEDICINE

## 2019-04-20 PROCEDURE — 83605 ASSAY OF LACTIC ACID: CPT

## 2019-04-20 PROCEDURE — 6370000000 HC RX 637 (ALT 250 FOR IP): Performed by: HOSPITALIST

## 2019-04-20 PROCEDURE — 83735 ASSAY OF MAGNESIUM: CPT

## 2019-04-20 PROCEDURE — 84132 ASSAY OF SERUM POTASSIUM: CPT

## 2019-04-20 PROCEDURE — 82803 BLOOD GASES ANY COMBINATION: CPT

## 2019-04-20 PROCEDURE — 8010000000 HC HEMODIALYSIS ACUTE INPT

## 2019-04-20 PROCEDURE — 96374 THER/PROPH/DIAG INJ IV PUSH: CPT

## 2019-04-20 PROCEDURE — 70450 CT HEAD/BRAIN W/O DYE: CPT

## 2019-04-20 PROCEDURE — 96376 TX/PRO/DX INJ SAME DRUG ADON: CPT

## 2019-04-20 PROCEDURE — 82948 REAGENT STRIP/BLOOD GLUCOSE: CPT

## 2019-04-20 PROCEDURE — 2000000000 HC ICU R&B

## 2019-04-20 PROCEDURE — 6360000002 HC RX W HCPCS: Performed by: HOSPITALIST

## 2019-04-20 PROCEDURE — 99291 CRITICAL CARE FIRST HOUR: CPT | Performed by: EMERGENCY MEDICINE

## 2019-04-20 PROCEDURE — 2580000003 HC RX 258: Performed by: HOSPITALIST

## 2019-04-20 PROCEDURE — 82140 ASSAY OF AMMONIA: CPT

## 2019-04-20 PROCEDURE — 85025 COMPLETE CBC W/AUTO DIFF WBC: CPT

## 2019-04-20 PROCEDURE — G0480 DRUG TEST DEF 1-7 CLASSES: HCPCS

## 2019-04-20 PROCEDURE — 99291 CRITICAL CARE FIRST HOUR: CPT | Performed by: HOSPITALIST

## 2019-04-20 PROCEDURE — 82009 KETONE BODYS QUAL: CPT

## 2019-04-20 PROCEDURE — 80053 COMPREHEN METABOLIC PANEL: CPT

## 2019-04-20 PROCEDURE — 82550 ASSAY OF CK (CPK): CPT

## 2019-04-20 PROCEDURE — 87081 CULTURE SCREEN ONLY: CPT

## 2019-04-20 PROCEDURE — 5A1D70Z PERFORMANCE OF URINARY FILTRATION, INTERMITTENT, LESS THAN 6 HOURS PER DAY: ICD-10-PCS | Performed by: INTERNAL MEDICINE

## 2019-04-20 PROCEDURE — 84484 ASSAY OF TROPONIN QUANT: CPT

## 2019-04-20 PROCEDURE — 99292 CRITICAL CARE ADDL 30 MIN: CPT | Performed by: HOSPITALIST

## 2019-04-20 PROCEDURE — 83690 ASSAY OF LIPASE: CPT

## 2019-04-20 PROCEDURE — 6370000000 HC RX 637 (ALT 250 FOR IP): Performed by: INTERNAL MEDICINE

## 2019-04-20 RX ORDER — ISOSORBIDE MONONITRATE 60 MG/1
120 TABLET, EXTENDED RELEASE ORAL DAILY
Status: DISCONTINUED | OUTPATIENT
Start: 2019-04-20 | End: 2019-04-20

## 2019-04-20 RX ORDER — ACETAMINOPHEN 325 MG/1
650 TABLET ORAL EVERY 8 HOURS PRN
Status: DISCONTINUED | OUTPATIENT
Start: 2019-04-20 | End: 2019-04-20

## 2019-04-20 RX ORDER — DEXTROSE MONOHYDRATE 50 MG/ML
100 INJECTION, SOLUTION INTRAVENOUS PRN
Status: DISCONTINUED | OUTPATIENT
Start: 2019-04-20 | End: 2019-04-23 | Stop reason: HOSPADM

## 2019-04-20 RX ORDER — DEXTROSE MONOHYDRATE 25 G/50ML
12.5 INJECTION, SOLUTION INTRAVENOUS PRN
Status: DISCONTINUED | OUTPATIENT
Start: 2019-04-20 | End: 2019-04-23 | Stop reason: HOSPADM

## 2019-04-20 RX ORDER — HYDRALAZINE HYDROCHLORIDE 50 MG/1
50 TABLET, FILM COATED ORAL EVERY 8 HOURS SCHEDULED
Status: DISCONTINUED | OUTPATIENT
Start: 2019-04-20 | End: 2019-04-20

## 2019-04-20 RX ORDER — ISOSORBIDE DINITRATE 10 MG/1
40 TABLET ORAL 3 TIMES DAILY
Status: DISCONTINUED | OUTPATIENT
Start: 2019-04-20 | End: 2019-04-21

## 2019-04-20 RX ORDER — SIMVASTATIN 40 MG
40 TABLET ORAL NIGHTLY
Status: DISCONTINUED | OUTPATIENT
Start: 2019-04-20 | End: 2019-04-23 | Stop reason: HOSPADM

## 2019-04-20 RX ORDER — NIFEDIPINE 10 MG/1
20 CAPSULE ORAL EVERY 8 HOURS SCHEDULED
Status: DISCONTINUED | OUTPATIENT
Start: 2019-04-20 | End: 2019-04-21

## 2019-04-20 RX ORDER — DEXTROSE AND SODIUM CHLORIDE 5; .45 G/100ML; G/100ML
INJECTION, SOLUTION INTRAVENOUS CONTINUOUS PRN
Status: DISCONTINUED | OUTPATIENT
Start: 2019-04-20 | End: 2019-04-23 | Stop reason: HOSPADM

## 2019-04-20 RX ORDER — MAGNESIUM SULFATE 1 G/100ML
1 INJECTION INTRAVENOUS PRN
Status: DISCONTINUED | OUTPATIENT
Start: 2019-04-20 | End: 2019-04-21

## 2019-04-20 RX ORDER — SODIUM CHLORIDE 0.9 % (FLUSH) 0.9 %
10 SYRINGE (ML) INJECTION EVERY 12 HOURS SCHEDULED
Status: DISCONTINUED | OUTPATIENT
Start: 2019-04-20 | End: 2019-04-23 | Stop reason: HOSPADM

## 2019-04-20 RX ORDER — HYDRALAZINE HYDROCHLORIDE 20 MG/ML
25 INJECTION INTRAMUSCULAR; INTRAVENOUS EVERY 8 HOURS SCHEDULED
Status: DISCONTINUED | OUTPATIENT
Start: 2019-04-20 | End: 2019-04-21

## 2019-04-20 RX ORDER — SODIUM CHLORIDE 0.9 % (FLUSH) 0.9 %
10 SYRINGE (ML) INJECTION PRN
Status: DISCONTINUED | OUTPATIENT
Start: 2019-04-20 | End: 2019-04-23 | Stop reason: HOSPADM

## 2019-04-20 RX ORDER — DULOXETIN HYDROCHLORIDE 60 MG/1
60 CAPSULE, DELAYED RELEASE ORAL DAILY
Status: DISCONTINUED | OUTPATIENT
Start: 2019-04-20 | End: 2019-04-23 | Stop reason: HOSPADM

## 2019-04-20 RX ORDER — NIFEDIPINE 60 MG/1
60 TABLET, EXTENDED RELEASE ORAL DAILY
Status: DISCONTINUED | OUTPATIENT
Start: 2019-04-20 | End: 2019-04-20

## 2019-04-20 RX ORDER — ACETAMINOPHEN 325 MG/1
650 TABLET ORAL EVERY 8 HOURS PRN
Status: DISCONTINUED | OUTPATIENT
Start: 2019-04-20 | End: 2019-04-23 | Stop reason: HOSPADM

## 2019-04-20 RX ORDER — 0.9 % SODIUM CHLORIDE 0.9 %
15 INTRAVENOUS SOLUTION INTRAVENOUS ONCE
Status: DISCONTINUED | OUTPATIENT
Start: 2019-04-20 | End: 2019-04-20

## 2019-04-20 RX ORDER — SODIUM CHLORIDE 450 MG/100ML
INJECTION, SOLUTION INTRAVENOUS CONTINUOUS
Status: DISCONTINUED | OUTPATIENT
Start: 2019-04-20 | End: 2019-04-20

## 2019-04-20 RX ORDER — ROPINIROLE 2 MG/1
2 TABLET, FILM COATED ORAL NIGHTLY
Status: DISCONTINUED | OUTPATIENT
Start: 2019-04-20 | End: 2019-04-23 | Stop reason: HOSPADM

## 2019-04-20 RX ORDER — INSULIN GLARGINE 100 [IU]/ML
15 INJECTION, SOLUTION SUBCUTANEOUS NIGHTLY
Status: DISCONTINUED | OUTPATIENT
Start: 2019-04-20 | End: 2019-04-23 | Stop reason: HOSPADM

## 2019-04-20 RX ORDER — ONDANSETRON 2 MG/ML
4 INJECTION INTRAMUSCULAR; INTRAVENOUS EVERY 6 HOURS PRN
Status: DISCONTINUED | OUTPATIENT
Start: 2019-04-20 | End: 2019-04-23 | Stop reason: HOSPADM

## 2019-04-20 RX ORDER — LEVOTHYROXINE SODIUM 0.07 MG/1
150 TABLET ORAL DAILY
Status: DISCONTINUED | OUTPATIENT
Start: 2019-04-20 | End: 2019-04-23 | Stop reason: HOSPADM

## 2019-04-20 RX ORDER — METOPROLOL TARTRATE 5 MG/5ML
10 INJECTION INTRAVENOUS EVERY 6 HOURS
Status: DISCONTINUED | OUTPATIENT
Start: 2019-04-20 | End: 2019-04-21

## 2019-04-20 RX ORDER — ONDANSETRON 2 MG/ML
4 INJECTION INTRAMUSCULAR; INTRAVENOUS ONCE
Status: COMPLETED | OUTPATIENT
Start: 2019-04-20 | End: 2019-04-20

## 2019-04-20 RX ORDER — HEPARIN SODIUM 5000 [USP'U]/ML
5000 INJECTION, SOLUTION INTRAVENOUS; SUBCUTANEOUS EVERY 8 HOURS SCHEDULED
Status: DISCONTINUED | OUTPATIENT
Start: 2019-04-20 | End: 2019-04-23 | Stop reason: HOSPADM

## 2019-04-20 RX ORDER — LORAZEPAM 2 MG/ML
1 INJECTION INTRAMUSCULAR ONCE
Status: COMPLETED | OUTPATIENT
Start: 2019-04-20 | End: 2019-04-20

## 2019-04-20 RX ORDER — NICOTINE POLACRILEX 4 MG
15 LOZENGE BUCCAL PRN
Status: DISCONTINUED | OUTPATIENT
Start: 2019-04-20 | End: 2019-04-23 | Stop reason: HOSPADM

## 2019-04-20 RX ORDER — METOCLOPRAMIDE HYDROCHLORIDE 5 MG/ML
10 INJECTION INTRAMUSCULAR; INTRAVENOUS EVERY 6 HOURS PRN
Status: DISCONTINUED | OUTPATIENT
Start: 2019-04-20 | End: 2019-04-21 | Stop reason: DRUGHIGH

## 2019-04-20 RX ORDER — 0.9 % SODIUM CHLORIDE 0.9 %
1 INTRAVENOUS SOLUTION INTRAVENOUS ONCE
Status: COMPLETED | OUTPATIENT
Start: 2019-04-20 | End: 2019-04-20

## 2019-04-20 RX ORDER — POTASSIUM CHLORIDE 7.45 MG/ML
10 INJECTION INTRAVENOUS PRN
Status: DISCONTINUED | OUTPATIENT
Start: 2019-04-20 | End: 2019-04-21

## 2019-04-20 RX ORDER — CLONIDINE HYDROCHLORIDE 0.2 MG/1
0.2 TABLET ORAL 3 TIMES DAILY
Status: DISCONTINUED | OUTPATIENT
Start: 2019-04-20 | End: 2019-04-23 | Stop reason: HOSPADM

## 2019-04-20 RX ORDER — ALBUTEROL SULFATE 2.5 MG/3ML
2.5 SOLUTION RESPIRATORY (INHALATION)
Status: DISCONTINUED | OUTPATIENT
Start: 2019-04-20 | End: 2019-04-23 | Stop reason: HOSPADM

## 2019-04-20 RX ORDER — SODIUM CHLORIDE, SODIUM LACTATE, POTASSIUM CHLORIDE, CALCIUM CHLORIDE 600; 310; 30; 20 MG/100ML; MG/100ML; MG/100ML; MG/100ML
INJECTION, SOLUTION INTRAVENOUS CONTINUOUS
Status: DISCONTINUED | OUTPATIENT
Start: 2019-04-20 | End: 2019-04-20

## 2019-04-20 RX ADMIN — SODIUM CHLORIDE: 4.5 INJECTION, SOLUTION INTRAVENOUS at 11:15

## 2019-04-20 RX ADMIN — INSULIN LISPRO 2 UNITS: 100 INJECTION, SOLUTION INTRAVENOUS; SUBCUTANEOUS at 23:54

## 2019-04-20 RX ADMIN — INSULIN HUMAN 10 UNITS: 100 INJECTION, SOLUTION PARENTERAL at 05:34

## 2019-04-20 RX ADMIN — ISOSORBIDE DINITRATE 40 MG: 10 TABLET ORAL at 20:39

## 2019-04-20 RX ADMIN — SIMVASTATIN 40 MG: 40 TABLET, FILM COATED ORAL at 20:39

## 2019-04-20 RX ADMIN — HYDRALAZINE HYDROCHLORIDE 25 MG: 20 INJECTION INTRAMUSCULAR; INTRAVENOUS at 14:47

## 2019-04-20 RX ADMIN — INSULIN LISPRO 3 UNITS: 100 INJECTION, SOLUTION INTRAVENOUS; SUBCUTANEOUS at 20:42

## 2019-04-20 RX ADMIN — DEXTROSE MONOHYDRATE 7.5 MG/HR: 50 INJECTION, SOLUTION INTRAVENOUS at 07:15

## 2019-04-20 RX ADMIN — ONDANSETRON 4 MG: 2 INJECTION INTRAMUSCULAR; INTRAVENOUS at 14:46

## 2019-04-20 RX ADMIN — INSULIN LISPRO 20 UNITS: 100 INJECTION, SOLUTION INTRAVENOUS; SUBCUTANEOUS at 09:15

## 2019-04-20 RX ADMIN — INSULIN GLARGINE 15 UNITS: 100 INJECTION, SOLUTION SUBCUTANEOUS at 20:41

## 2019-04-20 RX ADMIN — SODIUM CHLORIDE 59 ML: 9 INJECTION, SOLUTION INTRAVENOUS at 11:12

## 2019-04-20 RX ADMIN — HEPARIN SODIUM 5000 UNITS: 5000 INJECTION INTRAVENOUS; SUBCUTANEOUS at 20:42

## 2019-04-20 RX ADMIN — NIFEDIPINE 20 MG: 10 CAPSULE ORAL at 20:39

## 2019-04-20 RX ADMIN — ONDANSETRON 4 MG: 2 INJECTION INTRAMUSCULAR; INTRAVENOUS at 01:52

## 2019-04-20 RX ADMIN — INSULIN LISPRO 15 UNITS: 100 INJECTION, SOLUTION INTRAVENOUS; SUBCUTANEOUS at 08:16

## 2019-04-20 RX ADMIN — HEPARIN SODIUM 5000 UNITS: 5000 INJECTION INTRAVENOUS; SUBCUTANEOUS at 17:00

## 2019-04-20 RX ADMIN — Medication 10 ML: at 20:40

## 2019-04-20 RX ADMIN — DEXTROSE MONOHYDRATE 5 MG/HR: 50 INJECTION, SOLUTION INTRAVENOUS at 01:52

## 2019-04-20 RX ADMIN — CLONIDINE HYDROCHLORIDE 0.2 MG: 0.2 TABLET ORAL at 20:43

## 2019-04-20 RX ADMIN — DEXTROSE MONOHYDRATE 5 MG/HR: 50 INJECTION, SOLUTION INTRAVENOUS at 12:03

## 2019-04-20 RX ADMIN — CLONIDINE HYDROCHLORIDE 0.2 MG: 0.2 TABLET ORAL at 17:00

## 2019-04-20 RX ADMIN — HYDRALAZINE HYDROCHLORIDE 25 MG: 20 INJECTION INTRAMUSCULAR; INTRAVENOUS at 20:40

## 2019-04-20 RX ADMIN — ROPINIROLE HYDROCHLORIDE 2 MG: 2 TABLET, FILM COATED ORAL at 20:39

## 2019-04-20 RX ADMIN — METOCLOPRAMIDE 10 MG: 5 INJECTION, SOLUTION INTRAMUSCULAR; INTRAVENOUS at 20:40

## 2019-04-20 RX ADMIN — NIFEDIPINE 20 MG: 10 CAPSULE ORAL at 17:00

## 2019-04-20 RX ADMIN — METOPROLOL TARTRATE 10 MG: 5 INJECTION, SOLUTION INTRAVENOUS at 14:46

## 2019-04-20 RX ADMIN — LORAZEPAM 1 MG: 2 INJECTION INTRAMUSCULAR; INTRAVENOUS at 02:11

## 2019-04-20 RX ADMIN — LORAZEPAM 1 MG: 2 INJECTION INTRAMUSCULAR; INTRAVENOUS at 00:44

## 2019-04-20 RX ADMIN — METOPROLOL TARTRATE 10 MG: 5 INJECTION, SOLUTION INTRAVENOUS at 20:40

## 2019-04-20 RX ADMIN — ISOSORBIDE DINITRATE 40 MG: 10 TABLET ORAL at 17:00

## 2019-04-20 ASSESSMENT — PAIN SCALES - WONG BAKER
WONGBAKER_NUMERICALRESPONSE: 0
WONGBAKER_NUMERICALRESPONSE: 0

## 2019-04-20 ASSESSMENT — PAIN SCALES - GENERAL
PAINLEVEL_OUTOF10: 0
PAINLEVEL_OUTOF10: 0

## 2019-04-20 NOTE — PROGRESS NOTES
Speech Language Pathology  Facility/Department: [facility] ICU  Daily Treatment Note  NAME: Haile Monae  : 1970  MRN: [id number]     Received order to assess patient swallow function. However, patient considered lethargic and PO trials were not administered. Will attempt as alertness increases.      Electronically signed by JANN Mae on 2019 at 11:31 AM

## 2019-04-20 NOTE — ED PROVIDER NOTES
[facility]  eMERGENCY dEPARTMENT eNCOUnter      Pt Name: Elizabeth Uriarte  MRN: [id number]  [place] 1970  Date of evaluation: 4/19/2019  Provider: Joey Garcia MD    [address] [place] [address]       Chief Complaint   Patient presents with    Tremors     tremors, N/V today    Jaundice         HISTORY OF PRESENT ILLNESS   (Location/Symptom, Timing/Onset,Context/Setting, Quality, Duration, Modifying Factors, Severity)  Note limiting factors. Elizabeth Uriarte is a 47 y.o. female who presents to the emergency department for tremors and weakness. EMS tells us that pt called her son this evening. Pt will not tell me this evning what her issue is and why she needed to come to the ED. Pt has a hx of renal dz. Pt has a tremor. She told the RN that she has been drinking. Other than this I am unable to get any further information from her at this time. HPI    NursingNotes were reviewed.     REVIEW OF SYSTEMS    (2-9 systems for level 4, 10 or more for level 5)     Review of Systems   Unable to perform ROS: Mental status change            PAST MEDICALHISTORY     Past Medical History:   Diagnosis Date    Arthritis     Chronic kidney disease, stage 5, kidney failure ([address]     DM (diabetes mellitus) type II controlled with renal manifestation ([address] 06/1993    Gastroparesis     GERD (gastroesophageal reflux disease)     Hemodialysis patient ([address]     dialysis on tues, thur, and sat at [facility]    Hypertension     Hypothyroid     Nasal congestion     recent    Restless leg syndrome          SURGICAL HISTORY       Past Surgical History:   Procedure Laterality Date    BREAST SURGERY      infected milk gland removed    CHOLECYSTECTOMY      COLONOSCOPY      DIALYSIS FISTULA CREATION Left 1/25/2019    REVISION LEFT UPPER EXTREMITY AV ACCESS WITH LEFT BRACHIAL ARTERY TO LEFT AXILLARY VEIN WITH  INTERPOSITIONAL ARTEGRAFT   performed by Landry Toledo MD at [address]  2012    GALLBLADDER SURGERY      HC DIALYSIS CATHETER Right 1/25/2019    INSERTION OF RIGHT INTERNAL JUGULAR HEMODIALYSIS CATHETER performed [facility] at [facility]      pt denies hyst; states ablation    NC COLONOSCOPY W/BIOPSY SINGLE/MULTIPLE N/A 10/20/2017    Dr Otf Vaughan prep-Tubular AP (-) dysplasia x 2--3 yr recall    NC EGD TRANSORAL BIOPSY SINGLE/MULTIPLE N/A 12/27/2016    Dr Yaneth Matute EGD TRANSORAL BIOPSY SINGLE/MULTIPLE N/A 10/20/2017    Dr Robin Mabry (-)    TUBAL LIGATION      VASCULAR SURGERY Left 2016    fistula by Dr Willams Patient at [address]  10/02/2017    [facility]. Left upper fistulograms/venograms, balloon angioplasty cephalic vein arch [id number] conquest.    VASCULAR SURGERY  08/2018    FISTULOGRAM    VASCULAR SURGERY  10/29/2018    [facility]. Left upper fistulograms/venograms    VASCULAR SURGERY  12/19/2018    [facility]. Arch aortogram,left upper arteriograms.  VASCULAR SURGERY  03/06/2019    [facility]. Removal of tunneled dialyis catheter right internal jugular vein.          CURRENT MEDICATIONS     Previous Medications    ALBUTEROL SULFATE  (90 BASE) MCG/ACT INHALER    Inhale 2 puffs into the lungs every 4 hours as needed    CLONIDINE (CATAPRES) 0.2 MG TABLET    Take 1 tablet by mouth 3 times daily Indications: Uses prn per dialysis instructions    DULOXETINE (CYMBALTA) 60 MG EXTENDED RELEASE CAPSULE    Take 60 mg by mouth daily    HYDRALAZINE (APRESOLINE) 50 MG TABLET    Take 1 tablet by mouth every 8 hours    INSULIN ASPART (NOVOLOG FLEXPEN) 100 UNIT/ML INJECTION PEN    Inject 5 Units into the skin 3 times daily     INSULIN DETEMIR (LEVEMIR) 100 UNIT/ML INJECTION VIAL    Inject 15 Units into the skin nightly    ISOSORBIDE MONONITRATE (IMDUR) 120 MG EXTENDED RELEASE TABLET    Take 1 tablet by mouth daily    LEVOTHYROXINE (SYNTHROID) 150 MCG TABLET    Take 150 mcg by mouth Daily    METOPROLOL (LOPRESSOR) 100 MG TABLET    Take 100 mg by mouth 2 times daily NIFEDIPINE (ADALAT CC) 60 MG EXTENDED RELEASE TABLET    Take 60 mg by mouth daily    ONDANSETRON (ZOFRAN) 4 MG TABLET    Take 4 mg by mouth every 8 hours as needed for Nausea or Vomiting    ROPINIROLE (REQUIP) 2 MG TABLET    Take 2 mg by mouth nightly     SIMVASTATIN (ZOCOR) 40 MG TABLET    Take 40 mg by mouth nightly        ALLERGIES     Penicillins;  Lamisil advanced [terbinafine]; and Stadol [butorphanol]    FAMILY HISTORY       Family History   Problem Relation Age of Onset    Colon Cancer Mother          at 63    Colon Polyps Mother    Nick [place] Father          at 66    Colon Polyps Father     Stomach Cancer Sister     Breast Cancer Sister     Depression Daughter 25        committed suicide    Liver Cancer Neg Hx     Liver Disease Neg Hx     Esophageal Cancer Neg Hx     Rectal Cancer Neg Hx           SOCIAL HISTORY       Social History     Socioeconomic History    Marital status:      Spouse name: None    Number of children: 2    Years of education: None    Highest education level: None   Occupational History    None   Social Needs    Financial resource strain: None    Food insecurity:     Worry: None     Inability: None    Transportation needs:     Medical: None     Non-medical: None   Tobacco Use    Smoking status: Current Every Day Smoker     Packs/day: 0.50     Years: 25.00     Pack years: 12.50     Types: Cigarettes     Last attempt to quit: 2017     Years since quittin.9    Smokeless tobacco: Never Used   Substance and Sexual Activity    Alcohol use: No    Drug use: No    Sexual activity: Not Currently     Partners: Male   Lifestyle    Physical activity:     Days per week: None     Minutes per session: None    Stress: None   Relationships    Social connections:     Talks on phone: None     Gets together: None     Attends Mandaeism service: None     Active member of club or organization: None     Attends meetings of clubs or organizations: None Relationship status: None    Intimate partner violence:     Fear of current or ex partner: None     Emotionally abused: None     Physically abused: None     Forced sexual activity: None   Other Topics Concern    None   Social History Narrative    None       SCREENINGS             PHYSICAL EXAM    (up to 7 for level 4, 8 or more for level 5)     ED Triage Vitals [04/19/19 2253]   BP Temp Temp src Pulse Resp SpO2 Height Weight   -- -- -- -- -- -- 5' 6\" (1.676 m) 161 lb (73 kg)       Physical Exam   Constitutional: She appears well-developed. She appears lethargic. She appears ill. disheveled    HENT:   Head: Normocephalic and atraumatic. Mouth/Throat: Oropharynx is clear and moist.   Eyes: Pupils are equal, round, and reactive to light. Conjunctivae and EOM are normal.   Neck: Normal range of motion. Neck supple. No JVD present. Cardiovascular: Normal rate and regular rhythm. Murmur heard. Systolic murmur is present with a grade of 4/6. Pulmonary/Chest: Effort normal and breath sounds normal. She has no wheezes. She has no rales. Abdominal: Soft. Normal appearance and bowel sounds are normal. She exhibits no distension. There is generalized tenderness. There is guarding. There is no rigidity and no rebound. Patient is still not talking to me. When I examine her abdomen she appears to guard. She appears to be tender in all quadrants. Musculoskeletal: She exhibits no edema or deformity. Neurological: She has normal strength. She appears lethargic. No cranial nerve deficit or sensory deficit. Pt will move all extremities when we roll her to the side when she tells us that she needs to use the bathroom. Pt can be woken and will speak a few words to me, but is not able to tell me exactly why she is here. She tries to follow commands. Skin: Skin is warm and dry. Capillary refill takes less than 2 seconds.    Psychiatric:   Unable to eval 2/2 change in mental status   Nursing note and vitals reviewed. DIAGNOSTIC RESULTS     EKG: All EKG's areinterpreted by the Emergency Department Physician who either signs or Co-signs this chart in the absence of a cardiologist.    Sinus rhythm with a rate 83, left axis deviation, no acute ST elevations or depressions. RADIOLOGY:  Non-plain film images such as CT, Ultrasound and MRI are read by the radiologist. Plain radiographic images are visualized and preliminarily interpreted bythe emergency physician with the below findings:    CXR:  Normal cardiac silhouette, clear lung fields bilaterally, no pneumonia or pneumothorax. No edema    CT HEAD:    Comparison: 4/1/19    No acute findings or substantial change    Mild age-related generalized brain volume loss and chronic small vessel ischemic changes. CT ABDOMEN & PELVIS Without Contrast:    Comparison 10/23/17. No  film. There is some artifact on this exam.  There is distention of the distal colon with dense feces suggesting impaction. Given degree of distention, would question whether there is some mural thickening of the rectum. Stercoral colitis not excluded. There is a small amount intracystic air. Correlate for recent catheterization or other explanation for this finding. No bowel obstruction. Much of bowel is underdistended limiting evaluation for mural thickening. Do not feel that mural thickening can be excluded in underdistended bowel. No focal perienteric inflammatory changes appreciated. No urinary tract calculi or hydronephrosis. Appendix is some density within its lumen as on prior but no significant periappendiceal inflammatory changes or other convincing evidence for appendicitis. Caliber of appendix mildly ectatic measuring up to about 8 mm in caliber though this is unchanged compared to prior. Body wall edema. Cholecystectomy, old granulomatous disease and other nonemergent/incidentals.     XR CHEST PORTABLE    (Results Pending)   CT Head WO Contrast    (Results Pending)   CT Reviewed  Decide to obtain previous medical records or to obtain history from someone other than the patient: yes    Critical Care  Total time providing critical care: 30-74 minutes    Patient Progress  Patient progress: stable      Reassessment      CONSULTS:  IP CONSULT TO NEPHROLOGY    PROCEDURES:  Unless otherwise noted below, none     Procedures    FINAL IMPRESSION      1. Somnolence    2. Hypertensive emergency    3. Generalized abdominal pain    4. Lactic acidosis    5. Stage 5 chronic kidney disease on chronic dialysis ([address]    6. Hyperglycemia    7.  Fecal impaction in rectum [facility])          DISPOSITION/PLAN   DISPOSITION Admitted 04/20/2019 05:22:17 AM      PATIENT REFERRED TO:  ROMINA Faulkner  [address]  [phone]            DISCHARGE MEDICATIONS:  New Prescriptions    No medications on file          (Please note that portions of this note were completed with a voice recognition program.  Efforts were made to edit thedictations but occasionally words are mis-transcribed.)    Consuelo Muniz MD (electronically signed)  Attending Emergency Physician          Yakelin Chávez MD  04/20/19 9108

## 2019-04-20 NOTE — CONSULTS
Consult to Nephrology  Consult performed by: Alina Pichardo MD  Consult ordered by: Nelda Cornejo MD  Assessment/Recommendations: Nephrology [facility]) Consult Note      Patient:  Hai Elizabeth  YOB: 1970  Date of Service: 4/20/2019  MRN: [id number]   Acct: [de-identified]   Primary Care Physician: ROMINA Tijerina  Advance Directive: Full Code  Admit Date: 4/19/2019       Hospital Day: 0  Referring Provider: Sindhu Cerda MD    Patient independently seen and examined, Chart, Consults, Notes, Operative notes, Labs, Cardiology, and Radiology studies reviewed as able. Chief complaint: End-stage renal disease. Subjective:  Hai Elizabeth is a 47 y.o. female  whom we were consulted for end-stage renal disease. Patient has diabetic nephropathy. 2 end-stage renal disease and gets dialysis on Tuesday, Thursday and Saturday. She was admitted last night from the emergency room as she was found to be disoriented, confused/encephalopathy. In ICU she was found to have diabetic ketoacidosis, treated with IV fluid and insulin drip. She has severe systolic and diastolic hypertension. Apparently she is a noncompliant with insulin and medication use leading to very poorly controlled diabetes. This morning she is more alert and awake but remained in ICU. She is scheduled to have hemodialysis treatment today. Allergies:  Penicillins;  Lamisil advanced (terbinafine); and Stadol (butorphanol)    Medicines:  Current Facility-Administered Medications:  niCARdipine (CARDENE) 25 mg in dextrose 5 % 250 mL infusion, 5 mg/hr, Intravenous, Continuous, Chris Manzo MD, Last Rate: 50 mL/hr at 04/20/19 0152, 5 mg/hr at 04/20/19 0152  glucose (GLUTOSE) 40 % oral gel 15 g, 15 g, Oral, PRN, Daniel Jacobson MD  dextrose 50 % solution 12.5 g, 12.5 g, Intravenous, PRN, Daniel Jacobson MD   glucagon (rDNA) injection 1 mg, 1 mg, Intramuscular, PRN, Daniel Jacobson MD  dextrose 5 % solution, 100 mL/hr, Intravenous, PRN, Naldo Gracia MD  (Held by provider) insulin lispro (HUMALOG) injection vial 0-6 Units, 0-6 Units, Subcutaneous, Q4H, Daniel Jacobson MD  sodium chloride flush 0.9 % injection 10 mL, 10 mL, Intravenous, 2 times per day, Naldo Gracia MD  sodium chloride flush 0.9 % injection 10 mL, 10 mL, Intravenous, PRN, Daniel Jacobson MD  ondansetron (ZOFRAN) injection 4 mg, 4 mg, Intravenous, Q6H PRN, Daniel Jacobson MD  heparin (porcine) injection 5,000 Units, 5,000 Units, Subcutaneous, 3 times per day, Naldo Gracia MD  acetaminophen (TYLENOL) tablet 650 mg, 650 mg, Oral, Q8H PRN, Carlotta Calvo MD  cloNIDine (CATAPRES) tablet 0.2 mg, 0.2 mg, Oral, TID, Carlotta Calvo MD  DULoxetine (CYMBALTA) extended release capsule 60 mg, 60 mg, Oral, Daily, Carlotta Calvo MD  (Held by provider) insulin lispro (HUMALOG) injection vial 5 Units, 5 Units, Subcutaneous, TID WC, Carlotta Calvo MD  (Held by provider) insulin glargine (LANTUS) injection vial 15 Units, 15 Units, Subcutaneous, Nightly, Carlotta Calvo MD  levothyroxine (SYNTHROID) tablet 150 mcg, 150 mcg, Oral, Daily, Carlotta Calvo MD  isosorbide mononitrate (IMDUR) extended release tablet 120 mg, 120 mg, Oral, Daily, Carlotta Calvo MD  metoprolol (LOPRESSOR) injection 10 mg, 10 mg, Intravenous, Q6H, Carlotta Calvo MD  NIFEdipine (PROCARDIA XL) extended release tablet 60 mg, 60 mg, Oral, Daily, Carlotta Calvo MD  rOPINIRole (REQUIP) tablet 2 mg, 2 mg, Oral, Nightly, Carlotta Calvo MD  simvastatin (ZOCOR) tablet 40 mg, 40 mg, Oral, Nightly, Carlotta Calvo MD  albuterol (PROVENTIL) nebulizer solution 2.5 mg, 2.5 mg, Nebulization, Q1H PRN, Carlotta Calvo MD  hydrALAZINE (APRESOLINE) injection 25 mg, 25 mg, Intravenous, 3 times per day, Carlotta Calvo MD  dextrose 50 % solution 12.5 g, 12.5 g, Intravenous, PRN, Carlotta Calvo MD  potassium chloride 10 mEq/100 mL IVPB (Peripheral Line), 10 mEq, Intravenous, PRN, Carlotta Calvo MD  magnesium sulfate 1 g in dextrose 5% 100 mL IVPB, 1 g, Intravenous, PRN, Mathieu Gr MD  sodium phosphate 10 mmol in dextrose 5 % 250 mL IVPB, 10 mmol, Intravenous, PRN, Mathieu Gr MD    Or  sodium phosphate 15 mmol in dextrose 5 % 250 mL IVPB, 15 mmol, Intravenous, PRN, Mathieu Gr MD    Or  sodium phosphate 20 mmol in dextrose 5 % 500 mL IVPB, 20 mmol, Intravenous, PRN, Mathieu Gr MD  dextrose 5 % and 0.45 % sodium chloride infusion, , Intravenous, Continuous PRN, Mathieu Gr MD  0.9 % sodium chloride bolus, 1 mL/kg (Ideal), Intravenous, Once, Mathieu Gr MD    Followed by  0.45 % sodium chloride infusion, , Intravenous, Continuous, Mathieu Gr MD        Past Medical History:  Past Medical History:  No date: Arthritis  No date: Chronic kidney disease, stage 5, kidney failure ([facility] [address]  06/1993: DM (diabetes mellitus) type II controlled with renal   manifestation (HCC)  No date: Gastroparesis  No date: GERD (gastroesophageal reflux disease)  No date: Hemodialysis patient [facility])      Comment:  dialysis on tues, thur, and sat at [facility] clinic  No date: Hypertension  No date: Hypothyroid  No date: Nasal congestion      Comment:  recent  No date: Noncompliance with medications  No date: Restless leg syndrome    Past Surgical History:  Past Surgical History:  No date: BREAST SURGERY      Comment:  infected milk gland removed  No date: CHOLECYSTECTOMY  No date: COLONOSCOPY  1/25/2019: DIALYSIS FISTULA CREATION;  Left      Comment:  REVISION LEFT UPPER EXTREMITY AV ACCESS WITH LEFT                BRACHIAL ARTERY TO LEFT AXILLARY VEIN WITH                 INTERPOSITIONAL ARTEGRAFT   performed by Romain Murray MD at [facility]  2012: ENDOMETRIAL ABLATION  No date: GALLBLADDER SURGERY  1/25/2019: [facility] DIALYSIS CATHETER; Right      Comment:  INSERTION OF RIGHT INTERNAL JUGULAR HEMODIALYSIS                CATHETER performed by Romain Murray MD at [facility]  No date: HYSTERECTOMY      Comment:  pt denies hyst; states ablation  10/20/2017: SC COLONOSCOPY W/BIOPSY SINGLE/MULTIPLE; N/A      Comment:  Dr Homer Germain prep-Tubular AP (-) dysplasia x 2--3 yr                recall  2016: SC EGD TRANSORAL BIOPSY SINGLE/MULTIPLE; N/A      Comment:  Dr Shira Cerrato  10/20/2017: SC EGD TRANSORAL BIOPSY SINGLE/MULTIPLE; N/A      Comment:  Dr Noelle Dance (-)  No date: TUBAL LIGATION  2016: VASCULAR SURGERY; Left      Comment:  fistula by Dr Benita Stewart at [facility]  10/02/2017: VASCULAR SURGERY      Comment:  [facility]. Left upper fistulograms/venograms, balloon                angioplasty cephalic vein arch [id number] conquest.  2018: VASCULAR SURGERY      Comment:  FISTULOGRAM  10/29/2018: VASCULAR SURGERY      Comment:  [facility]. Left upper fistulograms/venograms  2018: VASCULAR SURGERY      Comment:  [facility]. Arch aortogram,left upper arteriograms. 2019: VASCULAR SURGERY      Comment:  [facility]. Removal of tunneled dialyis catheter right internal                jugular vein.     Family History  Review of patient's family history indicates:  Problem: Colon Cancer      Relation: Mother          Age of Onset: (Not Specified)          Comment:  at 61  Problem: Colon Polyps      Relation: Mother          Age of Onset: (Not Specified)  Problem: Lung Cancer      Relation: Father          Age of Onset: (Not Specified)          Comment:  at 66  Problem: Colon Polyps      Relation: Father          Age of Onset: (Not Specified)  Problem: Stomach Cancer      Relation: Sister          Age of Onset: (Not Specified)  Problem: Breast Cancer      Relation: Sister          Age of Onset: (Not Specified)  Problem: Depression      Relation: Daughter          Age of Onset: 25          Comment: committed suicide  Problem: Liver Cancer      Relation: Neg Hx          Age of Onset: (Not Specified)  Problem: Liver Disease      Relation: Neg Hx          Age of Onset: (Not Specified)  Problem: Esophageal Cancer      Relation: Neg Hx          Age of Onset: (Not Specified)  Problem: Rectal Cancer      Relation: Neg Hx          Age of Onset: (Not Specified)      Social History  Social History    Socioeconomic History      Marital status:       Spouse name: Not on file      Number of children: 2      Years of education: Not on file      Highest education level: Not on file    Occupational History      Not on file    Social Needs      Financial resource strain: Not on file      Food insecurity:        Worry: Not on file        Inability: Not on file      Transportation needs:        Medical: Not on file        Non-medical: Not on file    Tobacco Use      Smoking status: Current Every Day Smoker        Packs/day: 0.50        Years: 25.00        Pack years: 12.5        Types: Cigarettes        Quit date: 2017        Years since quittin.9      Smokeless tobacco: Never Used    Substance and Sexual Activity      Alcohol use: No      Drug use: No      Sexual activity: Not Currently        Partners: Male    Lifestyle      Physical activity:        Days per week: Not on file        Minutes per session: Not on file      Stress: Not on file    Relationships      Social connections:        Talks on phone: Not on file        Gets together: Not on file        Attends Congregational service: Not on file        Active member of club or organization: Not on file        Attends meetings of clubs or organizations: Not on file        Relationship status: Not on file      Intimate partner violence:        Fear of current or ex partner: Not on file        Emotionally abused: Not on file        Physically abused: Not on file        Forced sexual activity: Not on file    Other Topics      Concerns:        Not on file    Social History Narrative      Not on file        Review of Systems:  History obtained from chart review and the patient  General ROS: No fever or chills  Respiratory ROS: No cough, shortness of breath, wheezing  Cardiovascular ROS: No chest pain or palpitations  Gastrointestinal ROS: No abdominal pain or melena  Genito-Urinary ROS: No dysuria or hematuria  Musculoskeletal ROS: No joint pain or swelling   14 point ROS reviewed with the patient and negative except as noted above and in the HPI unless unable to obtain. Objective:  Patient Vitals in the past 24 hrs:  04/20/19 0700, BP:(!) 208/84, Pulse:90, Resp:22, SpO2:98 %  04/20/19 0630, BP:(!) 186/74, Temp:99.2 °F (37.3 °C), Temp src:Temporal, Pulse:104, Resp:25, SpO2:99 %, Height:5' 6\" (1.676 m), Weight:147 lb 3.2 oz (66.8 kg)  04/20/19 0533, BP:(!) 180/80  04/20/19 0529, BP:(!) 169/80, Temp:99 °F (37.2 °C), Temp src:Temporal, Pulse:100, Resp:19, SpO2:99 %  04/20/19 0433, BP:(!) 165/75, Temp:98.7 °F (37.1 °C), Pulse:100, Resp:24, SpO2:100 %  04/20/19 0403, BP:(!) 176/75, Pulse:98, Resp:20, SpO2:99 %  04/20/19 0332, BP:(!) 188/78, Pulse:93, Resp:19, SpO2:94 %  04/20/19 0301, BP:(!) 185/85, Pulse:95, Resp:17, SpO2:97 %  04/20/19 0233, BP:(!) 191/85, Pulse:91, Resp:21, SpO2:93 %  04/20/19 0200, BP:(!) 230/83, Pulse:82, Resp:17, SpO2:100 %  04/20/19 0100, BP:(!) 198/117, Pulse:73, Resp:15, SpO2:90 %  04/20/19 0001, BP:(!) 193/136, Pulse:77, SpO2:100 %  04/19/19 2335, SpO2:99 %  04/19/19 2304, BP:(!) 147/116, Temp:98.9 °F (37.2 °C), Pulse:76, Resp:29, SpO2:97 %  04/19/19 2253, Height:5' 6\" (1.676 m), Weight:161 lb (73 kg)  No intake or output data in the 24 hours ending 04/20/19 1022  General: awake/alert   HEENT: Normocephalic atraumatic head  Neck: Supple with no JVD or carotid bruits.   Chest:  clear to auscultation bilaterally without respiratory distress  CVS: regular rate and rhythm  Abdominal: soft, nontender, normal bowel sounds  Extremities: no cyanosis or edema  Skin: warm and dry without rash      Labs:  BMP:                 04/19/19 04/19/19                       2300          2333          NA           132*          --           K            4.5          4.2           CL           85* --           CO2          22            --           BUN          41*           --           CREATININE   5.1*          --           CALCIUM      9.8           --           CBC:                 04/19/19 2300          WBC          6.7           HGB          12.9          HCT          39.2          MCV          93.8          PLT          176           LIVER PROFILE:                 04/19/19 2300          AST          21            ALT          15            LIPASE       48            BILITOT      0.6           ALKPHOS      154*          PT/INR: No results for input(s): PROTIME, INR in the last 72 hours. APTT: No results for input(s): APTT in the last 72 hours. BNP:  No results for input(s): BNP in the last 72 hours. Ionized Calcium:No results for input(s): IONCA in the last 72 hours. Magnesium:No results for input(s): MG in the last 72 hours. Phosphorus:No results for input(s): PHOS in the last 72 hours. HgbA1C:                 04/19/19                       2315          LABA1C       10.7*         Hepatic:                 04/19/19 2300          ALKPHOS      154*          ALT          15            AST          21            PROT         7.4           BILITOT      0.6           LABALBU      4.4           Lactic Acid:                 04/20/19                       0656          LACTA        3.0*          Troponin:                 04/19/19 2300          CKTOTAL      39            ABGs: No results for input(s): PH, PCO2, PO2, HCO3, O2SAT in the last 72 hours. CRP:  No results for input(s): CRP in the last 72 hours. Sed Rate:  No results for input(s): SEDRATE in the last 72 hours. Cultures:   No results for input(s): CULTURE in the last 72 hours. No results for input(s): BC, Celena Ruben in the last 72 hours. No results for input(s): CXSURG in the last 72 hours.     Radiology reports as per the Radiologist  Radiology: Ct report. Signed by Dr Sarabjit Bradford on 4/20/2019 7:50 AM    Ct Head Wo Contrast    Result Date: 4/20/2019  EXAMINATION: CT HEAD WO CONTRAST 4/20/2019 7:16 AM HISTORY: Acute encephalopathy. DOSE: 760 mGycm. Automatic exposure control was utilized in an effort to use as little radiation as possible, without compromising image quality. REPORT: Spiral CT of head was performed without contrast, reconstructed coronal and sagittal images are also reviewed. COMPARISON: CT of the head without contrast 4/1/2019. No intracranial hemorrhage, mass or mass effect is identified. The ventricles and basal cisterns are within normal limits. There is mildly decreased attenuation of the periventricular white matter tracts, likely related to mild chronic small vessel white matter ischemic disease. Review of bone windows shows no fractures. There is normal aeration of the visualized paranasal sinuses and mastoid air cells. No acute intracranial abnormality. A preliminary report was provided by the [facility] radiology service. There is no significant discrepancy with the preliminary report. Signed by Dr Sarabjit Bradford on 4/20/2019 7:17 AM    Xr Chest Portable    Result Date: 4/20/2019  EXAMINATION: XR CHEST PORTABLE 4/20/2019 7:18 AM HISTORY: Shortness of breath. Report: Comparison is made with the chest x-ray on 3/27/2019. One-view chest: Frontal view of the chest was obtained. The lungs are clear and normally expanded. The cardiac and mediastinal silhouettes are within normal limits. The visualized bony thorax and upper abdomen are unremarkable. A vascular stent is noted in the left subclavian region as before. No active disease. Signed by Dr Sarabjit Bradford on 4/20/2019 7:19 AM       Assessment  1. End-stage renal disease on maintenance dialysis. 2. Diabetic nephropathy.   3. Insulin-dependent type II diabetes. 4. Poorly controlled diabetes. 5. Diabetic ketoacidosis is resolving. 6. Acute encephalopathy resolving. 7. Severe systolic and diastolic hypertension improving    Plan:  1. Resume hemodialysis treatment today. 2. Adjust blood pressure medications. 3. Plan was discussed with hospitalist group. Thank you for the consult, we appreciate the opportunity to provide care to your patients. Feel free to contact me if I can be of any further assistance.       Nitesh Villela MD  04/20/19  10:22 AM

## 2019-04-20 NOTE — PROGRESS NOTES
4 Eyes Skin Assessment    Thi Nieto is being assessed upon: Admission    I agree that I, Vanessa Adame, along with Claudean Enter RN have performed a thorough Head to Toe Skin Assessment on the patient. ALL assessment sites listed below have been assessed. Areas assessed by both nurses:     [x]   Head, Face, and Ears   [x]   Shoulders, Back, and Chest  [x]   Arms, Elbows, and Hands   [x]   Coccyx, Sacrum, and Ischium  [x]   Legs, Feet, and Heels    Does the Patient have Skin Breakdown?  No    Marek Prevention initiated: Yes  Wound Care Orders initiated: No    [facility] nurse consulted for Pressure Injury (Stage 3,4, Unstageable, DTI, NWPT, and Complex wounds) and New or Established Ostomies: No        Primary Nurse eSignature: Vanessa Adame RN on 4/20/2019 at 6:06 PM      Co-Signer eSignature: Electronically signed by James Honeycutt RN on 4/20/19 at 6:08 PM

## 2019-04-20 NOTE — PROGRESS NOTES
4/19/2019 11:34 PM - Brendan Kyin Incoming Lab Results From [employer] Value Ref Range & Units Status Collected Lab   pH, Arterial 7.660High Panic   7.350 - 7.450 Final 04/19/2019 11:33 PM [facility] Lab   pCO2, Arterial 19. 0Low Panic   35.0 - 45.0 mmHg Final 04/19/2019 11:33 PM [address] [place] [address] Lab   pO2, Arterial 64. 0Low   80.0 - 100.0 mmHg Final 04/19/2019 11:33 PM [address] [place] [address] Lab   HCO3, Arterial 21.4Low   22.0 - 26.0 mmol/L Final 04/19/2019 11:33 PM [facility] Excess, Arterial 2.9High   -2.0 - 2.0 mmol/L Final 04/19/2019 11:33 PM [address] [place] [address] Lab   Hemoglobin, Art, Extended 13.3  12.0 - 16.0 g/dL Final 04/19/2019 11:33 PM [address] [place] [address] Lab   O2 Sat, Arterial 94.6  >92 % Final 04/19/2019 11:33 PM [facility] Lab   Carboxyhgb, Arterial 1.2  0.0 - 5.0 % Final 04/19/2019 11:33 PM [facility] Lab        0.0-1.5   (Smokers 1.5-5.0)    Methemoglobin, Arterial 1.0  <1.5 % Final 04/19/2019 11:33 PM [address] [place] [address] Lab   O2 Content, Arterial 17.7  Not Established mL/dL Final 04/19/2019 11:33 PM [address] [place] [address] Lab     Pt on room air  resp rate 40  Right brachial puncture

## 2019-04-20 NOTE — PROGRESS NOTES
Malou Crain arrived to room # 153. Presented with: AMS, nausea & vomiting  Mental Status: Patient is disoriented. Vitals:    04/20/19 0533   BP: (!) 180/80   Pulse:    Resp:    Temp:    SpO2:      Patient safety contract and falls prevention contract reviewed with patient No.  Unable to orient patient to room. Patient mental status is altered at this time. Call light within reach. Yes.   Needs, issues or concerns expressed at this time: no.      Electronically signed by Brook Johnson RN on 4/20/2019 at 6:31 AM

## 2019-04-20 NOTE — H&P
ADMITTED: [id number]    PCP:  ROMINA Valencia    CODE STATUS: Full Code        SUBJECTIVE:    Chief Complaint   Patient presents with    Tremors     tremors, N/V today    Jaundice     HPI:  Mrs. Lane Dahl is a 47year old  american lady well known to both the ED and ICU staff. She has had multiple admissions for uncontrolled HTN developeing in to HTN Urgency and Emergency, as well as admission for DKA. She was last admitted here a few weeks ago. Her son had called EMS as she was having tremors and weakness. She reportedly told her D RN that she as drinking. She was noncooperative with the ED staff, and is remaining noncooperative to answer questions to me at this time.      ROS:   Not possible to gather as she is noncooperative    Past Medical History:   Diagnosis Date    Arthritis     Chronic kidney disease, stage 5, kidney failure ([address]     DM (diabetes mellitus) type II controlled with renal manifestation ([address] 06/1993    Gastroparesis     GERD (gastroesophageal reflux disease)     Hemodialysis patient ([address]     dialysis on tues, thur, and sat at [facility]    Hypertension     Hypothyroid     Nasal congestion     recent    Restless leg syndrome      Past Psychiatric History:  Not documented in EMR    Past Surgical History:   Procedure Laterality Date    BREAST SURGERY      infected milk gland removed    CHOLECYSTECTOMY      COLONOSCOPY      DIALYSIS FISTULA CREATION Left 1/25/2019    REVISION LEFT UPPER EXTREMITY AV ACCESS WITH LEFT BRACHIAL ARTERY TO LEFT AXILLARY VEIN WITH  INTERPOSITIONAL ARTEGRAFT   performed by Beth Lorenzana MD at [address]  2012    GALLBLADDER SURGERY      [address] Right 1/25/2019    INSERTION OF RIGHT INTERNAL JUGULAR HEMODIALYSIS CATHETER performed by Beth Lorenzana MD at [facility]      pt denies hyst; states ablation    [place] COLONOSCOPY W/BIOPSY SINGLE/MULTIPLE N/A 10/20/2017    Dr Bakari Liu prep-Tubular AP (-) dysplasia x 2--3 yr recall    [place] EGD TRANSORAL BIOPSY SINGLE/MULTIPLE N/A 2016    Dr Jaylon Duran EGD TRANSORAL BIOPSY SINGLE/MULTIPLE N/A 10/20/2017    Dr Maru Oliveira (-)    TUBAL LIGATION      VASCULAR SURGERY Left     fistula by Dr Korene Phoenix at [address]  10/02/2017    [facility]. Left upper fistulograms/venograms, balloon angioplasty cephalic vein arch 73R03 conquest.    VASCULAR SURGERY  2018    FISTULOGRAM    VASCULAR SURGERY  10/29/2018    [facility]. Left upper fistulograms/venograms    VASCULAR SURGERY  2018    [facility]. Arch aortogram,left upper arteriograms.  VASCULAR SURGERY  2019    [facility]. Removal of tunneled dialyis catheter right internal jugular vein.      Social History     Tobacco History     Smoking Status  Current Every Day Smoker Last attempt to quit  2017 Smoking Frequency  0.5 packs/day for 25 years (12.5 pk yrs) Smoking Tobacco Type  Cigarettes    Smokeless Tobacco Use  Never Used          Alcohol History     Alcohol Use Status  No          Drug Use     Drug Use Status  No          Sexual Activity     Sexually Active  Not Currently Partners  Male              Family History   Problem Relation Age of Onset    Colon Cancer Mother          at 61    Colon Polyps Mother     Lung Cancer Father          at 66    Colon Polyps Father     Stomach Cancer Sister     Breast Cancer Sister     Depression Daughter 25        committed suicide    Liver Cancer Neg Hx     Liver Disease Neg Hx     Esophageal Cancer Neg Hx     Rectal Cancer Neg Hx      Allergies   Allergen Reactions    Penicillins Shortness Of Breath     Sob and hives    Lamisil Advanced [Terbinafine] Nausea And Vomiting    Stadol [Butorphanol] Nausea And Vomiting     And made me feel crazy     Current Facility-Administered Medications   Medication Dose Route Frequency Provider Last Rate Last Dose    niCARdipine (CARDENE) 25 mg in dextrose 5 % 250 mL infusion  5 mg/hr (PROVENTIL) nebulizer solution 2.5 mg  2.5 mg Nebulization Q1H PRN Carlotta Calvo MD        hydrALAZINE (APRESOLINE) injection 25 mg  25 mg Intravenous 3 times per day Carlotta Calvo MD        insulin lispro (HUMALOG) injection vial 15 Units  15 Units Subcutaneous Once Carlotta Calvo MD        dextrose 50 % solution 12.5 g  12.5 g Intravenous PRN Carlotta Calvo MD        potassium chloride 10 mEq/100 mL IVPB (Peripheral Line)  10 mEq Intravenous PRN Carlotta Calvo MD        magnesium sulfate 1 g in dextrose 5% 100 mL IVPB  1 g Intravenous PRN Carlotta Calvo MD        sodium phosphate 10 mmol in dextrose 5 % 250 mL IVPB  10 mmol Intravenous PRN Carlotta Calvo MD        Or    sodium phosphate 15 mmol in dextrose 5 % 250 mL IVPB  15 mmol Intravenous PRN Carlotta Calvo MD        Or    sodium phosphate 20 mmol in dextrose 5 % 500 mL IVPB  20 mmol Intravenous PRN Carlotta Calvo MD        0.9 % sodium chloride bolus  15 mL/kg Intravenous Once Carlotta Calvo MD        Followed by   Tarri Bruno 0.45 % sodium chloride infusion   Intravenous Continuous Carlotta Calvo MD        dextrose 5 % and 0.45 % sodium chloride infusion   Intravenous Continuous PRN Carlotta Calvo MD             OBJECTIVE:    Vitals:    04/20/19 0700   BP: (!) 208/84   Pulse: 90   Resp: 22   Temp:    SpO2: 98%   breathing on NC at 2LPM    Physical Exam   Constitutional: She appears well-developed and well-nourished. She appears lethargic. No distress. HENT:   Head: Normocephalic and atraumatic. No LAD   Eyes: Right eye exhibits no discharge. Left eye exhibits no discharge. Neck: Neck supple. No tracheal deviation present. Cardiovascular: Normal rate and regular rhythm. Exam reveals no gallop and no friction rub. No murmur heard. Pulmonary/Chest: Effort normal and breath sounds normal. No stridor. No respiratory distress. She has no wheezes. She has no rhonchi. She has no rales. She exhibits no tenderness. Abdominal: Soft.  Bowel sounds are normal. There is no tenderness. There is no rebound and no guarding. Lymphadenopathy:        Head (right side): No submental, no submandibular, no tonsillar, no preauricular, no posterior auricular and no occipital adenopathy present. Head (left side): No submental, no submandibular, no tonsillar, no preauricular, no posterior auricular and no occipital adenopathy present. Right cervical: No superficial cervical adenopathy present. Left cervical: No superficial cervical adenopathy present. Left: No supraclavicular adenopathy present. Neurological: She appears lethargic. Skin: Skin is warm. She is not diaphoretic. Psychiatric:   noncooperative She is attentive. Vitals reviewed. LABS:  Results for Carlos Chávez (MRN [id number]) as of 4/20/2019 07:44   Ref.  Range 4/19/2019 23:00   Sodium Latest Ref Range: 136 - 145 mmol/L 132 (L)   Potassium Latest Ref Range: 3.5 - 5.0 mmol/L 4.5   Chloride Latest Ref Range: 98 - 111 mmol/L 85 (L)   CO2 Latest Ref Range: 22 - 29 mmol/L 22   BUN Latest Ref Range: 6 - 20 mg/dL 41 (H)   Creatinine Latest Ref Range: 0.5 - 0.9 mg/dL 5.1 (H)   Anion Gap Latest Ref Range: 7 - 19 mmol/L 25 (H)   GFR Non- Latest Ref Range: >60  9 (A)   Glucose Latest Ref Range: 74 - 109 mg/dL 331 (H)   Calcium Latest Ref Range: 8.6 - 10.0 mg/dL 9.8   Total Protein Latest Ref Range: 6.6 - 8.7 g/dL 7.4   Total CK Latest Ref Range: 26 - 192 U/L 39   Albumin Latest Ref Range: 3.5 - 5.2 g/dL 4.4   Alk Phos Latest Ref Range: 35 - 104 U/L 154 (H)   ALT Latest Ref Range: 5 - 33 U/L 15   Ammonia Latest Ref Range: 11 - 51 umol/L 23   AST Latest Ref Range: 5 - 32 U/L 21   Bilirubin Latest Ref Range: 0.2 - 1.2 mg/dL 0.6   Lipase Latest Ref Range: 13 - 60 U/L 48   Ethanol Lvl Latest Units: mg/dL <10   WBC Latest Ref Range: 4.8 - 10.8 K/uL 6.7   RBC Latest Ref Range: 4.20 - 5.40 M/uL 4.18 (L)   Hemoglobin Quant Latest Ref Range: 12.0 - 16.0 g/dL 12.9   Hematocrit Latest Ref Range: 37.0 - 47.0 % 39.2   MCV Latest Ref Range: 81.0 - 99.0 fL 93.8   MCH Latest Ref Range: 27.0 - 31.0 pg 30.9   MCHC Latest Ref Range: 33.0 - 37.0 g/dL 32.9 (L)   MPV Latest Ref Range: 9.4 - 12.3 fL 10.3   RDW Latest Ref Range: 11.5 - 14.5 % 13.6   Platelet Count Latest Ref Range: 130 - 400 K/uL 176   Neutrophils % Latest Ref Range: 50.0 - 65.0 % 63.9   Lymphocyte % Latest Ref Range: 20.0 - 40.0 % 22.0   Monocytes % Latest Ref Range: 0.0 - 10.0 % 10.5 (H)   Eosinophils % Latest Ref Range: 0.0 - 5.0 % 1.8   Basophils % Latest Ref Range: 0.0 - 1.0 % 1.5 (H)   Neutrophils # Latest Ref Range: 1.5 - 7.5 K/uL 4.3   Lymphocytes # Latest Ref Range: 1.1 - 4.5 K/uL 1.5   Monocytes # Latest Ref Range: 0.00 - 0.90 K/uL 0.70   Eosinophils # Latest Ref Range: 0.00 - 0.60 K/uL 0.10   Basophils # Latest Ref Range: 0.00 - 0.20 K/uL 0.10   Results for Luis Martinez (MRN [id number]) as of 4/20/2019 07:44   Ref. Range 4/19/2019 23:33   Hemoglobin, Art, Extended Latest Ref Range: 12.0 - 16.0 g/dL 13.3   pH, Arterial Latest Ref Range: 7.350 - 7.450  7.660 (HH)   pCO2, Arterial Latest Ref Range: 35.0 - 45.0 mmHg 19.0 (LL)   pO2, Arterial Latest Ref Range: 80.0 - 100.0 mmHg 64.0 (L)   HCO3, Arterial Latest Ref Range: 22.0 - 26.0 mmol/L 21.4 (L)   Base Excess, Arterial Latest Ref Range: -2.0 - 2.0 mmol/L 2.9 (H)   O2 Sat, Arterial Latest Ref Range: >92 % 94.6   O2 Content, Arterial Latest Ref Range: Not Established mL/dL 17.7   Methemoglobin, Arterial Latest Ref Range: <1.5 % 1.0   Carboxyhgb, Arterial Latest Ref Range: 0.0 - 5.0 % 1.2   Results for Kaci OLIVEIRA (MRN [id number]) as of 4/20/2019 07:44   Ref.  Range 4/20/2019 00:23   Urine Reflex to Culture Unknown YES   Color, UA Latest Ref Range: Straw/Yellow  YELLOW   Clarity, UA Latest Ref Range: Clear  Clear   Glucose, UA Latest Ref Range: Negative mg/dL 250 (A)   Bilirubin, Urine Latest Ref Range: Negative  Negative   Ketones, Urine Latest Ref Range: Negative mg/dL Negative Specific Oil Trough, UA Latest Ref Range: 1.005 - 1.030  1.011   Blood, Urine Latest Ref Range: Negative  TRACE (A)   pH, UA Latest Ref Range: 5.0 - 8.0  8.0   Protein, UA Latest Ref Range: Negative mg/dL 300   Urobilinogen, Urine Latest Ref Range: <2.0 E.U./dL 0.2   Nitrite, Urine Latest Ref Range: Negative  Negative   Leukocyte Esterase, Urine Latest Ref Range: Negative  Negative   Hyaline Casts, UA Latest Ref Range: 0 - 8 /HPF 1   WBC, UA Latest Ref Range: 0 - 5 /HPF 1   RBC, UA Latest Ref Range: 0 - 4 /HPF 10 (H)   Epi Cells Latest Ref Range: 0 - 5 /HPF 1   Bacteria, UA Latest Units: /HPF NEGATIVE   Results for Janet Lo (MRN [id number]) as of 4/20/2019 07:44   Ref. Range 4/20/2019 00:23   Amphetamine Screen, Urine Latest Ref Range: Negative <1000 ng/mL  Negative   Barbiturate Screen, Ur Latest Ref Range: Negative < 200 ng/mL  Negative   Benzodiazepine Screen, Urine Latest Ref Range: Negative <100 ng/mL  Negative   Cannabinoid Scrn, Ur Latest Ref Range: Negative <50 ng/mL  Negative   Opiate Scrn, Ur Latest Ref Range: Negative < 300 ng/mL  Negative   Cocaine Metabolite Screen, Urine Latest Ref Range: Negative <300 ng/mL  Negative   Results for Janet Lo (MRN [id number]) as of 4/20/2019 07:44   Ref. Range 4/19/2019 23:15   Hemoglobin A1C Latest Ref Range: 4.0 - 6.0 % 10.7 (H)   Results for Janet Lo (MRN [id number]) as of 4/20/2019 07:44   Ref. Range 3/27/2019 10:32 3/27/2019 15:14 3/27/2019 18:08 4/1/2019 07:23 4/20/2019 06:56   Troponin Latest Ref Range: 0.00 - 0.03 ng/mL 0.02 0.03 0.02 0.01 0.02   Results for Janet Lo (MRN [id number]) as of 4/20/2019 07:44   Ref. Range 3/31/2019 03:27 4/1/2019 03:25 4/2/2019 03:54 4/2/2019 12:15 4/19/2019 23:00   GFR Non- Latest Ref Range: >60  10 (A) 8 (A) 6 (A) 19 (A) 9 (A)   Results for Janet Lo (MRN [id number]) as of 4/20/2019 08:04   Ref.  Range 4/20/2019 06:52   Acetone, Bld Latest Ref Range: Negative  Small (A)     DIAGNOSTICS:  Narrative EXAMINATION: XR CHEST PORTABLE 4/20/2019 7:18 AM   HISTORY: Shortness of breath. Report: Comparison is made with the chest x-ray on 3/27/2019. One-view chest: Frontal view of the chest was obtained. The lungs are   clear and normally expanded. The cardiac and mediastinal silhouettes   are within normal limits. The visualized bony thorax and upper abdomen   are unremarkable. A vascular stent is noted in the left subclavian   region as before.                                                                                                                                                            Impression             No active disease.       Signed by Dr Lawyer Elissa Bradford on 4/20/2019 7:19 AM     Narrative   EXAMINATION: CT HEAD WO CONTRAST 4/20/2019 7:16 AM   HISTORY: Acute encephalopathy. DOSE: 760 mGycm. Automatic exposure control was utilized in an effort   to use as little radiation as possible, without compromising image   quality. REPORT: Spiral CT of head was performed without contrast,   reconstructed coronal and sagittal images are also reviewed. COMPARISON: CT of the head without contrast 4/1/2019. No intracranial hemorrhage, mass or mass effect is identified. The   ventricles and basal cisterns are within normal limits. There is   mildly decreased attenuation of the periventricular white matter   tracts, likely related to mild chronic small vessel white matter   ischemic disease. Review of bone windows shows no fractures. There is   normal aeration of the visualized paranasal sinuses and mastoid air   cells.       Impression   No acute intracranial abnormality. A preliminary report was provided by the [facility] radiology service. There is no significant discrepancy with the preliminary report.       Signed by Dr Lawyer Rowland. Sanchez on 4/20/2019 7:17 AM     Narrative   EXAMINATION: CT ABDOMEN PELVIS WO CONTRAST 4/20/2019 7:44 AM   HISTORY: Acute abdominal pain. DOSE: 1234 mGycm.  Automatic exposure control was utilized in an effort   to use as little radiation as possible, without compromising image   quality. REPORT: Spiral CT of the abdomen and pelvis was performed without   contrast from the lung bases through the pubic symphysis. Reconstructed coronal and sagittal images are also reviewed. COMPARISON: CT of the abdomen and pelvis without contrast 10/23/2017. Review of lung windows demonstrates mild bibasilar atelectasis. There   is respiratory motion artifact. Cholecystectomy clips are present. The   liver and spleen are grossly normal on this unenhanced study. The   stomach is poorly distended. There is advanced renal atrophy   bilaterally, no hydronephrosis is identified. There is increased   attenuation of body wall fat planes diffusely compatible with third   spacing of fluid in mild anasarca. The rectum is dilated due to a   large stool ball. Fecal impaction is not excluded. There is at least   mild rectal wall thickening. No evidence of bowel obstruction is   identified. Bowel loops are difficult to evaluate due to motion and   the lack of oral contrast. No evidence of appendicitis is identified. There is no free fluid or free air. Review of bone windows is   unremarkable.       Impression   1. Limited study due to motion artifact and the lack of contrast.   Fecal impaction at the rectal level with mild thickening of the wall   the rectum. No evidence of bowel obstruction. No free fluid or free   air. 2. No evidence of appendicitis. 3. Increased attenuation of body wall fat planes compatible with   generalized volume overload and mild anasarca. Moderate renal atrophy   as before. 4. Evidence of previous cholecystectomy. A preliminary report was provided by the [facility] radiology service. There is no significant discrepancy with the preliminary report. Signed by Dr Isaiah Lynn.  Sanchez on 4/20/2019 7:50 AM         ASESSMENTS & PLANS:    DKA on underlying DM type 2 complicated by ESRD on HD all secondary to Medication Noncompliance  HTN Encephelopathy due to HTN Urgency due to Medication Noncompliance  Nephro consulted for HD  DKA Protocol with fluids greatly reduced as per her ESRD with near Anuria  Insulin GGT as per DKA protocol  Fingersticks as per DKA protocol  LABS as per DKA protocol  Cardene GGT  Restarting meds - was bale to covrt metoprolol (10mg IV Q6h instead of 100mg succinate PO QDay) and hydralazine to IV  (25mg IV Q8h in place of 50mg PO Q8h)  Continue IMDUR 120mg PO QDay ONLY when able to swallow  Continue Levothyroxine 150mcg PO QAC   Continue Nifedipene XL 60mg PO QDay PNLY when able to swallow  Continue Simvastatin 40mg pO QHS  Continue Clonidine 0.2mg PO TID    Lethargy alternating with periods of agitation:  Will need 1:1 sitter (or 1:1 RN)    DVT PPx: Heparin 5,000units Q Q8h    Depression versus Anxiety versus Neuropathy:  Continue cymbalta 60mg PO QDay    Restless Leg Syndrome:  Continue Ropinerole 2mg PO QHS    Patient Active Problem List   Diagnosis    Chronic constipation    Gastroparesis    Gastroesophageal reflux disease without esophagitis    Hypertensive emergency    Acquired hypothyroidism    Hyperkalemia    Anemia in chronic kidney disease (CKD)    Iron deficiency    Family history of colon cancer    Unintentional weight loss    Type 2 diabetes mellitus with chronic kidney disease on chronic dialysis, with long-term current use of insulin ([address]    Hypertensive urgency    Noncompliance of patient with renal dialysis ([address]    Respiratory failure ([address]    Acute respiratory failure with hypoxia and hypercapnia (HCC)    Hyponatremia    Dialysis AV fistula malfunction (HCC)    Gastroparesis    Nausea and vomiting    Chronic constipation    Uncontrolled type 2 diabetes mellitus with complication, with long-term current use of insulin (HCC)    Abnormal CT of liver    Other ascites    ESRD on dialysis (HCC)    High hepatic iron concentration determined by biopsy of liver    Steal syndrome as complication of dialysis access ([address]    PVD (peripheral vascular disease) ([address]    ESRD on hemodialysis ([address]    Hyponatremia    Hyperglycemia due to type 2 diabetes mellitus (HCC)    Normocytic anemia    Volume overload    Left homonymous hemianopsia    Acute intractable headache    Accelerated hypertension       CC rime of >90 minutes    ADDENDUM:  Case d/w Nephrology  Will give more fluids but not bolus as already had one  HD will remove excess fluids  We concur Encephalopathy is Hypertensive    ADDENDUM:  Replacing imdur 120 With isordil 40 TID  Procardia XL 60 being replaced with regular nifedipine 20 Q8h

## 2019-04-21 LAB
ALBUMIN SERPL-MCNC: 3.4 G/DL (ref 3.5–5.2)
ALP BLD-CCNC: 116 U/L (ref 35–104)
ALT SERPL-CCNC: 10 U/L (ref 5–33)
ANION GAP SERPL CALCULATED.3IONS-SCNC: 15 MMOL/L (ref 7–19)
AST SERPL-CCNC: 14 U/L (ref 5–32)
BASOPHILS ABSOLUTE: 0.1 K/UL (ref 0–0.2)
BASOPHILS RELATIVE PERCENT: 1.1 % (ref 0–1)
BILIRUB SERPL-MCNC: 0.3 MG/DL (ref 0.2–1.2)
BUN BLDV-MCNC: 21 MG/DL (ref 6–20)
CALCIUM SERPL-MCNC: 9.2 MG/DL (ref 8.6–10)
CHLORIDE BLD-SCNC: 93 MMOL/L (ref 98–111)
CO2: 28 MMOL/L (ref 22–29)
CREAT SERPL-MCNC: 4.2 MG/DL (ref 0.5–0.9)
EKG P AXIS: 76 DEGREES
EKG P-R INTERVAL: 158 MS
EKG Q-T INTERVAL: 398 MS
EKG QRS DURATION: 88 MS
EKG QTC CALCULATION (BAZETT): 436 MS
EKG T AXIS: 65 DEGREES
EOSINOPHILS ABSOLUTE: 0.1 K/UL (ref 0–0.6)
EOSINOPHILS RELATIVE PERCENT: 1.5 % (ref 0–5)
GFR NON-AFRICAN AMERICAN: 11
GLUCOSE BLD-MCNC: 100 MG/DL (ref 74–109)
GLUCOSE BLD-MCNC: 103 MG/DL (ref 70–99)
GLUCOSE BLD-MCNC: 107 MG/DL (ref 70–99)
GLUCOSE BLD-MCNC: 108 MG/DL (ref 70–99)
GLUCOSE BLD-MCNC: 123 MG/DL (ref 70–99)
GLUCOSE BLD-MCNC: 92 MG/DL (ref 70–99)
HCT VFR BLD CALC: 32.5 % (ref 37–47)
HEMOGLOBIN: 10.5 G/DL (ref 12–16)
LYMPHOCYTES ABSOLUTE: 1.5 K/UL (ref 1.1–4.5)
LYMPHOCYTES RELATIVE PERCENT: 20.1 % (ref 20–40)
MCH RBC QN AUTO: 31 PG (ref 27–31)
MCHC RBC AUTO-ENTMCNC: 32.3 G/DL (ref 33–37)
MCV RBC AUTO: 95.9 FL (ref 81–99)
MONOCYTES ABSOLUTE: 0.8 K/UL (ref 0–0.9)
MONOCYTES RELATIVE PERCENT: 11.4 % (ref 0–10)
MRSA CULTURE ONLY: NORMAL
NEUTROPHILS ABSOLUTE: 4.8 K/UL (ref 1.5–7.5)
NEUTROPHILS RELATIVE PERCENT: 65.8 % (ref 50–65)
PDW BLD-RTO: 14.5 % (ref 11.5–14.5)
PERFORMED ON: ABNORMAL
PERFORMED ON: NORMAL
PHOSPHORUS: 4.7 MG/DL (ref 2.5–4.5)
PLATELET # BLD: 162 K/UL (ref 130–400)
PMV BLD AUTO: 9.9 FL (ref 9.4–12.3)
POTASSIUM SERPL-SCNC: 4 MMOL/L (ref 3.5–5)
RBC # BLD: 3.39 M/UL (ref 4.2–5.4)
SODIUM BLD-SCNC: 136 MMOL/L (ref 136–145)
T4 FREE: 1 NG/DL (ref 0.9–1.7)
TOTAL PROTEIN: 6.3 G/DL (ref 6.6–8.7)
TSH REFLEX FT4: 23.56 UIU/ML (ref 0.35–5.5)
WBC # BLD: 7.1 K/UL (ref 4.8–10.8)

## 2019-04-21 PROCEDURE — 82948 REAGENT STRIP/BLOOD GLUCOSE: CPT

## 2019-04-21 PROCEDURE — 6360000002 HC RX W HCPCS: Performed by: INTERNAL MEDICINE

## 2019-04-21 PROCEDURE — 2580000003 HC RX 258: Performed by: HOSPITALIST

## 2019-04-21 PROCEDURE — 2580000003 HC RX 258: Performed by: INTERNAL MEDICINE

## 2019-04-21 PROCEDURE — 6370000000 HC RX 637 (ALT 250 FOR IP): Performed by: HOSPITALIST

## 2019-04-21 PROCEDURE — 80053 COMPREHEN METABOLIC PANEL: CPT

## 2019-04-21 PROCEDURE — 84439 ASSAY OF FREE THYROXINE: CPT

## 2019-04-21 PROCEDURE — 6360000002 HC RX W HCPCS: Performed by: HOSPITALIST

## 2019-04-21 PROCEDURE — 99291 CRITICAL CARE FIRST HOUR: CPT | Performed by: HOSPITALIST

## 2019-04-21 PROCEDURE — 6370000000 HC RX 637 (ALT 250 FOR IP): Performed by: INTERNAL MEDICINE

## 2019-04-21 PROCEDURE — 1210000000 HC MED SURG R&B

## 2019-04-21 PROCEDURE — 85025 COMPLETE CBC W/AUTO DIFF WBC: CPT

## 2019-04-21 PROCEDURE — 2700000000 HC OXYGEN THERAPY PER DAY

## 2019-04-21 PROCEDURE — 84100 ASSAY OF PHOSPHORUS: CPT

## 2019-04-21 PROCEDURE — 36415 COLL VENOUS BLD VENIPUNCTURE: CPT

## 2019-04-21 PROCEDURE — 84443 ASSAY THYROID STIM HORMONE: CPT

## 2019-04-21 RX ORDER — SENNA PLUS 8.6 MG/1
1 TABLET ORAL 2 TIMES DAILY
Status: DISCONTINUED | OUTPATIENT
Start: 2019-04-21 | End: 2019-04-23 | Stop reason: HOSPADM

## 2019-04-21 RX ORDER — HYDRALAZINE HYDROCHLORIDE 50 MG/1
50 TABLET, FILM COATED ORAL EVERY 8 HOURS SCHEDULED
Status: DISCONTINUED | OUTPATIENT
Start: 2019-04-21 | End: 2019-04-23 | Stop reason: HOSPADM

## 2019-04-21 RX ORDER — METOCLOPRAMIDE HYDROCHLORIDE 5 MG/ML
5 INJECTION INTRAMUSCULAR; INTRAVENOUS EVERY 6 HOURS PRN
Status: DISCONTINUED | OUTPATIENT
Start: 2019-04-21 | End: 2019-04-23 | Stop reason: HOSPADM

## 2019-04-21 RX ORDER — LISINOPRIL 20 MG/1
20 TABLET ORAL 2 TIMES DAILY
Status: DISCONTINUED | OUTPATIENT
Start: 2019-04-21 | End: 2019-04-23 | Stop reason: HOSPADM

## 2019-04-21 RX ORDER — NIFEDIPINE 60 MG/1
60 TABLET, EXTENDED RELEASE ORAL DAILY
Status: DISCONTINUED | OUTPATIENT
Start: 2019-04-21 | End: 2019-04-22

## 2019-04-21 RX ORDER — ISOSORBIDE MONONITRATE 60 MG/1
120 TABLET, EXTENDED RELEASE ORAL DAILY
Status: DISCONTINUED | OUTPATIENT
Start: 2019-04-21 | End: 2019-04-23 | Stop reason: HOSPADM

## 2019-04-21 RX ORDER — METOPROLOL SUCCINATE 50 MG/1
100 TABLET, EXTENDED RELEASE ORAL DAILY
Status: DISCONTINUED | OUTPATIENT
Start: 2019-04-21 | End: 2019-04-22

## 2019-04-21 RX ADMIN — HEPARIN SODIUM 5000 UNITS: 5000 INJECTION INTRAVENOUS; SUBCUTANEOUS at 06:10

## 2019-04-21 RX ADMIN — DULOXETINE HYDROCHLORIDE 60 MG: 60 CAPSULE, DELAYED RELEASE ORAL at 08:50

## 2019-04-21 RX ADMIN — ROPINIROLE HYDROCHLORIDE 2 MG: 2 TABLET, FILM COATED ORAL at 20:48

## 2019-04-21 RX ADMIN — INSULIN GLARGINE 15 UNITS: 100 INJECTION, SOLUTION SUBCUTANEOUS at 20:49

## 2019-04-21 RX ADMIN — INSULIN LISPRO 5 UNITS: 100 INJECTION, SOLUTION INTRAVENOUS; SUBCUTANEOUS at 17:43

## 2019-04-21 RX ADMIN — HEPARIN SODIUM 5000 UNITS: 5000 INJECTION INTRAVENOUS; SUBCUTANEOUS at 14:05

## 2019-04-21 RX ADMIN — HYDRALAZINE HYDROCHLORIDE 25 MG: 20 INJECTION INTRAMUSCULAR; INTRAVENOUS at 06:10

## 2019-04-21 RX ADMIN — ONDANSETRON 4 MG: 2 INJECTION INTRAMUSCULAR; INTRAVENOUS at 07:47

## 2019-04-21 RX ADMIN — NIFEDIPINE 20 MG: 10 CAPSULE ORAL at 06:09

## 2019-04-21 RX ADMIN — HEPARIN SODIUM 5000 UNITS: 5000 INJECTION INTRAVENOUS; SUBCUTANEOUS at 20:49

## 2019-04-21 RX ADMIN — ISOSORBIDE MONONITRATE 120 MG: 60 TABLET, EXTENDED RELEASE ORAL at 15:31

## 2019-04-21 RX ADMIN — NIFEDIPINE 60 MG: 60 TABLET, FILM COATED, EXTENDED RELEASE ORAL at 15:31

## 2019-04-21 RX ADMIN — Medication 10 ML: at 22:10

## 2019-04-21 RX ADMIN — METOPROLOL SUCCINATE 100 MG: 50 TABLET, EXTENDED RELEASE ORAL at 17:42

## 2019-04-21 RX ADMIN — INSULIN LISPRO 5 UNITS: 100 INJECTION, SOLUTION INTRAVENOUS; SUBCUTANEOUS at 08:46

## 2019-04-21 RX ADMIN — Medication 8.6 MG: at 20:48

## 2019-04-21 RX ADMIN — NIFEDIPINE 20 MG: 10 CAPSULE ORAL at 13:57

## 2019-04-21 RX ADMIN — INSULIN LISPRO 5 UNITS: 100 INJECTION, SOLUTION INTRAVENOUS; SUBCUTANEOUS at 13:41

## 2019-04-21 RX ADMIN — CLONIDINE HYDROCHLORIDE 0.2 MG: 0.2 TABLET ORAL at 13:58

## 2019-04-21 RX ADMIN — Medication 10 ML: at 08:51

## 2019-04-21 RX ADMIN — LISINOPRIL 20 MG: 20 TABLET ORAL at 10:38

## 2019-04-21 RX ADMIN — SIMVASTATIN 40 MG: 40 TABLET, FILM COATED ORAL at 20:48

## 2019-04-21 RX ADMIN — CLONIDINE HYDROCHLORIDE 0.2 MG: 0.2 TABLET ORAL at 21:58

## 2019-04-21 RX ADMIN — ISOSORBIDE DINITRATE 40 MG: 10 TABLET ORAL at 13:57

## 2019-04-21 RX ADMIN — LEVOTHYROXINE SODIUM 150 MCG: 75 TABLET ORAL at 06:09

## 2019-04-21 RX ADMIN — Medication 8.6 MG: at 13:57

## 2019-04-21 RX ADMIN — CLONIDINE HYDROCHLORIDE 0.2 MG: 0.2 TABLET ORAL at 08:50

## 2019-04-21 RX ADMIN — LISINOPRIL 20 MG: 20 TABLET ORAL at 20:48

## 2019-04-21 RX ADMIN — ISOSORBIDE DINITRATE 40 MG: 10 TABLET ORAL at 08:50

## 2019-04-21 RX ADMIN — HYDRALAZINE HYDROCHLORIDE 50 MG: 50 TABLET, FILM COATED ORAL at 17:42

## 2019-04-21 ASSESSMENT — PAIN SCALES - WONG BAKER
WONGBAKER_NUMERICALRESPONSE: 0

## 2019-04-21 ASSESSMENT — PAIN SCALES - GENERAL
PAINLEVEL_OUTOF10: 0

## 2019-04-21 NOTE — PROGRESS NOTES
Pharmacy Renal Adjustment    Haile Monae is a 47 y.o. female. Pharmacy has renally adjusted medications per protocol. Recent Labs     04/20/19  1905 04/21/19  0343   BUN 18 21*       Recent Labs     04/20/19  1905 04/21/19  0343   CREATININE 3.1* 4.2*       Estimated Creatinine Clearance: 15 mL/min (A) (based on SCr of 4.2 mg/dL (H)). Hemodialysis dependant patient TUTa    Height:   Ht Readings from Last 1 Encounters:   04/20/19 5' 6\" (1.676 m)     Weight:  Wt Readings from Last 1 Encounters:   04/21/19 144 lb (65.3 kg)         Plan: Adjust the following medications based on renal function:          Reglan to 5mg IV q6hr prn nausea not relieved by Zofran.     Electronically signed by Natalia Spangler, [address] [place] [address] on 4/21/2019 at 4:20 PM

## 2019-04-21 NOTE — PROGRESS NOTES
4 Eyes Skin Assessment    Sincere Skinner is being assessed upon: Transfer to New Unit    I agree that Bethene Pro, along with Jordi Pelayo (either 2 RN's or 1 LPN and 1 RN) have performed a thorough Head to Toe Skin Assessment on the patient. ALL assessment sites listed below have been assessed. Areas assessed by both nurses:     [x]   Head, Face, and Ears   [x]   Shoulders, Back, and Chest  [x]   Arms, Elbows, and Hands   [x]   Coccyx, Sacrum, and Ischium  [x]   Legs, Feet, and Heels    Does the Patient have Skin Breakdown?  No    Marek Prevention initiated: No  Wound Care Orders initiated: NA    [facility] nurse consulted for Pressure Injury (Stage 3,4, Unstageable, DTI, NWPT, and Complex wounds) and New or Established Ostomies: NA        Primary Nurse eSignature: Claudette Myers, RN on 4/21/2019 at 6:31 PM      Co-Signer eSignature: {Esignature:[id number]}

## 2019-04-21 NOTE — PLAN OF CARE
Problem: Falls - Risk of:  Goal: Will remain free from falls  Description  Will remain free from falls  4/21/2019 0429 by Qamar Aguilera RN  Outcome: Ongoing  4/20/2019 1935 by Jessi Reynoso RN  Outcome: Ongoing  Goal: Absence of physical injury  Description  Absence of physical injury  4/21/2019 0429 by Qamar Aguilera RN  Outcome: Ongoing  4/20/2019 1935 by Jessi Reynoso RN  Outcome: Ongoing     Problem: Risk for Impaired Skin Integrity  Goal: Tissue integrity - skin and mucous membranes  Description  Structural intactness and normal physiological function of skin and  mucous membranes.   Outcome: Ongoing

## 2019-04-21 NOTE — PROGRESS NOTES
Nephrology ([address] [facility]) Progress Note      Patient:  Malou Crain  YOB: 1970  Date of Service: 4/21/2019  MRN: [id number]   Acct: [de-identified]   Primary Care Physician: ROMINA Posadas  Advance Directive: Full Code  Admit Date: 4/19/2019       Hospital Day: 1  Referring Provider: Bharat Tanner MD    Patient independently seen and examined, Chart, Consults, Notes, Operative notes, Labs, Cardiology, and Radiology studies reviewed as able. Chief complaint: ESRD. Subjective:  Malou Crain is a 47 y.o. female  whom we were consulted for end-stage renal disease. Patient has diabetic nephropathy and gets Dialysis on Tuesday Thursday Saturday. She presented with severe encephalopathy and severe hyperglycemia, DKA. She also has severe systolic and diastolic hypertension which has improved. Patient was initially treated with insulin and IV fluid. Currently seen in ICU and feels very well. Allergies:  Penicillins;  Lamisil advanced [terbinafine]; and Stadol [butorphanol]    Medicines:  Current Facility-Administered Medications   Medication Dose Route Frequency Provider Last Rate Last Dose    niCARdipine (CARDENE) 25 mg in dextrose 5 % 250 mL infusion  5 mg/hr Intravenous Continuous Blanca Coyne MD   Stopped at 04/20/19 1750    glucose (GLUTOSE) 40 % oral gel 15 g  15 g Oral PRN Daniel Jacobson MD        dextrose 50 % solution 12.5 g  12.5 g Intravenous PRN Isela Trammell MD        glucagon (rDNA) injection 1 mg  1 mg Intramuscular PRN Daniel Jacobson MD        dextrose 5 % solution  100 mL/hr Intravenous PRN Daniel Jacobson MD        insulin lispro (HUMALOG) injection vial 0-6 Units  0-6 Units Subcutaneous Q4H Daniel Jacobson MD   2 Units at 04/20/19 9454    sodium chloride flush 0.9 % injection 10 mL  10 mL Intravenous 2 times per day Isela Trammell MD   10 mL at 04/21/19 0851    sodium chloride flush 0.9 % injection 10 mL  10 mL Intravenous PRN Sully Herrmann MD        Or    sodium phosphate 20 mmol in dextrose 5 % 500 mL IVPB  20 mmol Intravenous PRN Sully Herrmann MD        dextrose 5 % and 0.45 % sodium chloride infusion   Intravenous Continuous PRN Sully Herrmann MD        isosorbide dinitrate (ISORDIL) tablet 40 mg  40 mg Oral TID Sully Herrmann MD   40 mg at 04/21/19 0850    NIFEdipine (PROCARDIA) capsule 20 mg  20 mg Oral 3 times per day Sully Herrmann MD   20 mg at 04/21/19 6845    metoclopramide (REGLAN) injection 10 mg  10 mg Intravenous Q6H PRN Sully Herrmann MD   10 mg at 04/20/19 2040       Past Medical History:  Past Medical History:   Diagnosis Date    Arthritis     Chronic kidney disease, stage 5, [facility] failure [address]     DM (diabetes mellitus) type II controlled with renal manifestation ([address] 06/1993    Gastroparesis     GERD (gastroesophageal reflux disease)     Hemodialysis patient ([address]     dialysis on tues, thur, and sat at [facility]    Hypertension     Hypothyroid     Nasal congestion     recent    Noncompliance with medications     Restless leg syndrome        Past Surgical History:  Past Surgical History:   Procedure Laterality Date    BREAST SURGERY      infected milk gland removed    CHOLECYSTECTOMY      COLONOSCOPY      DIALYSIS FISTULA CREATION Left 1/25/2019    REVISION LEFT UPPER EXTREMITY AV ACCESS WITH LEFT BRACHIAL ARTERY TO LEFT AXILLARY VEIN WITH  INTERPOSITIONAL ARTEGRAFT   performed by Landry Toledo MD at [address]  2012    GALLBLADDER SURGERY      [address] Right 1/25/2019    INSERTION OF RIGHT INTERNAL JUGULAR HEMODIALYSIS CATHETER performed by Landry Toledo MD at [facility] denies hyst; states ablation    TX COLONOSCOPY W/BIOPSY SINGLE/MULTIPLE N/A 10/20/2017    Dr Mackenzie Fritz prep-Tubular AP (-) dysplasia x 2--3 yr recall    TX EGD TRANSORAL BIOPSY SINGLE/MULTIPLE N/A 12/27/2016    Dr Emerita Ramírez EGD TRANSORAL BIOPSY Relationships    Social connections:     Talks on phone: Not on file     Gets together: Not on file     Attends [employer] service: Not on file     Active member of club or organization: Not on file     Attends meetings of clubs or organizations: Not on file     Relationship status: Not on file    Intimate partner violence:     Fear of current or ex partner: Not on file     Emotionally abused: Not on file     Physically abused: Not on file     Forced sexual activity: Not on file   Other Topics Concern    Not on file   Social History Narrative    Not on file         Review of Systems:  History obtained from chart review and the patient  General ROS: No fever or chills  Respiratory ROS: No cough, shortness of breath, wheezing  Cardiovascular ROS: No chest pain or palpitations  Gastrointestinal ROS: No abdominal pain or melena  Genito-Urinary ROS: No dysuria or hematuria  Musculoskeletal ROS: No joint pain or swelling   14 point ROS reviewed with the patient and negative except as noted above and in the HPI unless unable to obtain.     Objective:  Patient Vitals for the past 24 hrs:   BP Temp Temp src Pulse Resp SpO2 Weight   04/21/19 0700 (!) 119/57 99.3 °F (37.4 °C) Temporal 70 19 95 % --   04/21/19 0600 118/60 -- -- 63 15 96 % --   04/21/19 0530 -- -- -- -- -- -- 144 lb (65.3 kg)   04/21/19 0500 117/64 -- -- 67 15 97 % --   04/21/19 0400 116/62 98.4 °F (36.9 °C) Temporal 67 14 97 % --   04/21/19 0300 (!) 113/57 -- -- 65 20 99 % --   04/21/19 0200 135/63 -- -- 70 26 100 % --   04/21/19 0100 (!) 105/49 -- -- 67 20 99 % --   04/21/19 0000 (!) 123/54 99.4 °F (37.4 °C) Temporal 73 16 99 % --   04/20/19 2300 (!) 110/54 -- -- 77 21 99 % --   04/20/19 2200 (!) 107/49 -- -- 82 16 99 % --   04/20/19 2159 -- 100.8 °F (38.2 °C) Temporal -- -- -- --   04/20/19 2100 (!) 147/60 100.8 °F (38.2 °C) Temporal 83 19 98 % --   04/20/19 2000 138/61 -- -- 89 19 98 % --   04/20/19 1908 -- -- -- -- 17 99 % --   04/20/19 1900 (!) 137/58 -- -- 89 20 99 % --   04/20/19 1830 (!) 151/64 -- -- 82 18 -- --   04/20/19 1800 (!) 147/60 -- -- 84 17 -- --   04/20/19 1745 (!) 114/54 -- -- 84 16 -- --   04/20/19 1730 (!) 123/57 -- -- 86 17 -- --   04/20/19 1700 (!) 152/76 -- -- 87 16 99 % --   04/20/19 1600 (!) 153/61 -- -- 85 20 99 % --   04/20/19 1530 132/61 -- -- 78 17 -- --   04/20/19 1500 (!) 151/64 -- -- 78 17 100 % --   04/20/19 1430 (!) 167/67 -- -- 84 20 -- --   04/20/19 1400 (!) 164/66 -- -- 88 18 100 % --   04/20/19 1330 (!) 159/66 -- -- 87 16 -- --   04/20/19 1300 (!) 162/66 -- -- 87 19 100 % --   04/20/19 1230 (!) 158/69 -- -- 88 22 -- --   04/20/19 1200 (!) 161/68 98.7 °F (37.1 °C) Temporal 87 21 100 % --   04/20/19 1130 (!) 158/66 -- -- 90 20 -- --   04/20/19 1100 (!) 170/65 -- -- 95 17 100 % --   04/20/19 1030 (!) 149/54 -- -- 90 22 -- --   04/20/19 1000 (!) 157/55 -- -- 90 21 99 % --       Intake/Output Summary (Last 24 hours) at 4/21/2019 0940  Last data filed at 4/21/2019 0900  Gross per 24 hour   Intake 1168.33 ml   Output 150 ml   Net 1018.33 ml     General: awake/alert   HEENT: Normocephalic atraumatic head  Neck: Supple with no JVD or carotid bruits.   Chest:  clear to auscultation bilaterally without respiratory distress  CVS: regular rate and rhythm  Abdominal: soft, nontender, normal bowel sounds  Extremities: no cyanosis or edema  Skin: warm and dry without rash      Labs:  BMP:   Recent Labs     04/20/19  1240 04/20/19  1905 04/21/19  0343   * 136 136   K 4.2 3.9 4.0   CL 86* 93* 93*   CO2 27 26 28   PHOS 3.1  --  4.7*   BUN 49* 18 21*   CREATININE 6.5* 3.1* 4.2*   CALCIUM 10.0 9.1 9.2     CBC:   Recent Labs     04/19/19  2300 04/21/19  0343   WBC 6.7 7.1   HGB 12.9 10.5*   HCT 39.2 32.5*   MCV 93.8 95.9    162     LIVER PROFILE:   Recent Labs     04/19/19  2300 04/21/19  0343   AST 21 14   ALT 15 10   LIPASE 48  --    BILITOT 0.6 0.3   ALKPHOS 154* 116*     PT/INR: No results for input(s): PROTIME, INR in the last 72 hours.  APTT: No results for input(s): APTT in the last 72 hours. BNP:  No results for input(s): BNP in the last 72 hours. Ionized Calcium:No results for input(s): IONCA in the last 72 hours. Magnesium:  Recent Labs     04/20/19  1240 04/20/19  1905   MG 2.2 2.0     Phosphorus:  Recent Labs     04/20/19  1240 04/21/19  0343   PHOS 3.1 4.7*     HgbA1C:   Recent Labs     04/19/19  2315   LABA1C 10.7*     Hepatic:   Recent Labs     04/19/19  2300 04/21/19  0343   ALKPHOS 154* 116*   ALT 15 10   AST 21 14   PROT 7.4 6.3*   BILITOT 0.6 0.3   LABALBU 4.4 3.4*     Lactic Acid:   Recent Labs     04/20/19  1240   LACTA 2.3*     Troponin:   Recent Labs     04/19/19  2300   CKTOTAL 39     ABGs: No results for input(s): PH, PCO2, PO2, HCO3, O2SAT in the last 72 hours. CRP:  No results for input(s): CRP in the last 72 hours. Sed Rate:  No results for input(s): SEDRATE in the last 72 hours. Cultures:   No results for input(s): CULTURE in the last 72 hours. No results for input(s): BC, Eugene Left in the last 72 hours. No results for input(s): CXSURG in the last 72 hours. Radiology reports as per the Radiologist  Radiology: Ct Abdomen Pelvis Wo Contrast Additional Contrast? None    Result Date: 4/20/2019  EXAMINATION: CT ABDOMEN PELVIS WO CONTRAST 4/20/2019 7:44 AM HISTORY: Acute abdominal pain. DOSE: 1234 mGycm. Automatic exposure control was utilized in an effort to use as little radiation as possible, without compromising image quality. REPORT: Spiral CT of the abdomen and pelvis was performed without contrast from the lung bases through the pubic symphysis. Reconstructed coronal and sagittal images are also reviewed. COMPARISON: CT of the abdomen and pelvis without contrast 10/23/2017. Review of lung windows demonstrates mild bibasilar atelectasis. There is respiratory motion artifact. Cholecystectomy clips are present. The liver and spleen are grossly normal on this unenhanced study.  The stomach is poorly distended. There is advanced renal atrophy bilaterally, no hydronephrosis is identified. There is increased attenuation of body wall fat planes diffusely compatible with third spacing of fluid in mild anasarca. The rectum is dilated due to a large stool ball. Fecal impaction is not excluded. There is at least mild rectal wall thickening. No evidence of bowel obstruction is identified. Bowel loops are difficult to evaluate due to motion and the lack of oral contrast. No evidence of appendicitis is identified. There is no free fluid or free air. Review of bone windows is unremarkable. 1. Limited study due to motion artifact and the lack of contrast. Fecal impaction at the rectal level with mild thickening of the wall the rectum. No evidence of bowel obstruction. No free fluid or free air. 2. No evidence of appendicitis. 3. Increased attenuation of body wall fat planes compatible with generalized volume overload and mild anasarca. Moderate renal atrophy as before. 4. Evidence of previous cholecystectomy. A preliminary report was provided by the [facility] radiology service. There is no significant discrepancy with the preliminary report. Signed by Dr Beena Cleaning. Sanchez on 4/20/2019 7:50 AM    Ct Head Wo Contrast    Result Date: 4/20/2019  EXAMINATION: CT HEAD WO CONTRAST 4/20/2019 7:16 AM HISTORY: Acute encephalopathy. DOSE: 760 mGycm. Automatic exposure control was utilized in an effort to use as little radiation as possible, without compromising image quality. REPORT: Spiral CT of head was performed without contrast, reconstructed coronal and sagittal images are also reviewed. COMPARISON: CT of the head without contrast 4/1/2019. No intracranial hemorrhage, mass or mass effect is identified. The ventricles and basal cisterns are within normal limits. There is mildly decreased attenuation of the periventricular white matter tracts, likely related to mild chronic small vessel white matter ischemic disease.  Review of bone windows shows no fractures. There is normal aeration of the visualized paranasal sinuses and mastoid air cells. No acute intracranial abnormality. A preliminary report was provided by the [facility] radiology service. There is no significant discrepancy with the preliminary report. Signed by Dr Kaleigh Bradford on 4/20/2019 7:17 AM    Xr Chest Portable    Result Date: 4/20/2019  EXAMINATION: XR CHEST PORTABLE 4/20/2019 7:18 AM HISTORY: Shortness of breath. Report: Comparison is made with the chest x-ray on 3/27/2019. One-view chest: Frontal view of the chest was obtained. The lungs are clear and normally expanded. The cardiac and mediastinal silhouettes are within normal limits. The visualized bony thorax and upper abdomen are unremarkable. A vascular stent is noted in the left subclavian region as before. No active disease. Signed by Dr Kaleigh Bradford on 4/20/2019 7:19 AM       Assessment   1. End-stage renal disease on maintenance dialysis  2. Diabetic nephropathy. 3. Insulin-dependent type II diabetes. 4. DKA now resolved. 5. Severe systolic and diastolic hypertension. 6. Poorly controlled diabetes. 7. Acute encephalopathy now resolved. 8. Anemia of chronic kidney disease. Plan:  1. Try to wean off Cardene  2. Adjust by mouth blood pressure medications. 3. Possible transfer to regular floor once she is off Cardene.     Jeremy Licea MD  04/21/19  9:40 AM

## 2019-04-21 NOTE — PROGRESS NOTES
Pt c/o nausea with turning from side to side. Zofran 4 mg IVP given. Pt states nausea has subsided. Pt A & O. No signs of confusion at this time.

## 2019-04-21 NOTE — PROGRESS NOTES
mg Oral TID    DULoxetine  60 mg Oral Daily    insulin lispro  5 Units Subcutaneous TID     insulin glargine  15 Units Subcutaneous Nightly    levothyroxine  150 mcg Oral Daily    metoprolol  10 mg Intravenous Q6H    rOPINIRole  2 mg Oral Nightly    simvastatin  40 mg Oral Nightly    hydrALAZINE  25 mg Intravenous 3 times per day    isosorbide dinitrate  40 mg Oral TID    NIFEdipine  20 mg Oral 3 times per day     PRN Infusions:   dextrose      dextrose 5 % and 0.45 % NaCl       PRN Meds:glucose, dextrose, glucagon (rDNA), dextrose, sodium chloride flush, ondansetron, acetaminophen, albuterol, dextrose, dextrose 5 % and 0.45 % NaCl, metoclopramide        Objective:     Vitals reviewed. I/O last 3 completed shifts: In: 1373.8 [P.O.:265; I.V.:1108.8]  Out: 150 [Urine:150]  I/O this shift:  In: 120 [P.O.:120]  Out: -     BP (!) 119/59   Pulse 79   Temp 99.3 °F (37.4 °C) (Temporal)   Resp 26   Ht 5' 6\" (1.676 m)   Wt 144 lb (65.3 kg)   SpO2 96%   BMI 23.24 kg/m²     Physical Exam   Constitutional: She appears well-developed and well-nourished. She appears alert. No distress. Head: Normocephalic and atraumatic. Eyes: Right eye exhibits no discharge. Left eye exhibits no discharge. Neck: Neck supple. No tracheal deviation present. Cardiovascular: Normal rate and regular rhythm. Exam reveals no gallop and no friction rub. No murmur heard. Pulmonary/Chest: Effort normal and breath sounds normal. No stridor. No respiratory distress. She has no wheezes. She has no rhonchi. She has no rales. She exhibits no tenderness. Abdominal: Soft. Bowel sounds are normal. There is no tenderness. There is no rebound and no guarding. Neurological: She appears alert and oriented. Skin: Skin is warm. She is not diaphoretic. Psychiatric: appropriate affect, answers and asks questions appropriately, attentive  Vitals reviewed for last 24 hours.     LABS:  Results for Jahaira Paul (MRN [id number]) as of 4/21/2019 13:58   Ref.  Range 4/21/2019 03:43   Sodium Latest Ref Range: 136 - 145 mmol/L 136   Potassium Latest Ref Range: 3.5 - 5.0 mmol/L 4.0   Chloride Latest Ref Range: 98 - 111 mmol/L 93 (L)   CO2 Latest Ref Range: 22 - 29 mmol/L 28   BUN Latest Ref Range: 6 - 20 mg/dL 21 (H)   Creatinine Latest Ref Range: 0.5 - 0.9 mg/dL 4.2 (H)   Anion Gap Latest Ref Range: 7 - 19 mmol/L 15   GFR Non- Latest Ref Range: >60  11 (A)   Glucose Latest Ref Range: 74 - 109 mg/dL 100   Calcium Latest Ref Range: 8.6 - 10.0 mg/dL 9.2   Phosphorus Latest Ref Range: 2.5 - 4.5 mg/dL 4.7 (H)   Total Protein Latest Ref Range: 6.6 - 8.7 g/dL 6.3 (L)   Albumin Latest Ref Range: 3.5 - 5.2 g/dL 3.4 (L)   Alk Phos Latest Ref Range: 35 - 104 U/L 116 (H)   ALT Latest Ref Range: 5 - 33 U/L 10   AST Latest Ref Range: 5 - 32 U/L 14   Bilirubin Latest Ref Range: 0.2 - 1.2 mg/dL 0.3   WBC Latest Ref Range: 4.8 - 10.8 K/uL 7.1   RBC Latest Ref Range: 4.20 - 5.40 M/uL 3.39 (L)   Hemoglobin Quant Latest Ref Range: 12.0 - 16.0 g/dL 10.5 (L)   Hematocrit Latest Ref Range: 37.0 - 47.0 % 32.5 (L)   MCV Latest Ref Range: 81.0 - 99.0 fL 95.9   MCH Latest Ref Range: 27.0 - 31.0 pg 31.0   MCHC Latest Ref Range: 33.0 - 37.0 g/dL 32.3 (L)   MPV Latest Ref Range: 9.4 - 12.3 fL 9.9   RDW Latest Ref Range: 11.5 - 14.5 % 14.5   Platelet Count Latest Ref Range: 130 - 400 K/uL 162   Neutrophils % Latest Ref Range: 50.0 - 65.0 % 65.8 (H)   Lymphocyte % Latest Ref Range: 20.0 - 40.0 % 20.1   Monocytes % Latest Ref Range: 0.0 - 10.0 % 11.4 (H)   Eosinophils % Latest Ref Range: 0.0 - 5.0 % 1.5   Basophils % Latest Ref Range: 0.0 - 1.0 % 1.1 (H)   Neutrophils # Latest Ref Range: 1.5 - 7.5 K/uL 4.8   Lymphocytes # Latest Ref Range: 1.1 - 4.5 K/uL 1.5   Monocytes # Latest Ref Range: 0.00 - 0.90 K/uL 0.80   Eosinophils # Latest Ref Range: 0.00 - 0.60 K/uL 0.10   Basophils # Latest Ref Range: 0.00 - 0.20 K/uL 0.10         Assessment:     Active Problems: Hypertensive emergency    Type 2 diabetes mellitus with chronic kidney disease on chronic dialysis, with long-term current use of insulin (HCC)    Hypertensive urgency, malignant    Noncompliance of patient with renal dialysis ([facility]    Nausea and vomiting    Uncontrolled type 2 diabetes mellitus with complication, with long-term current use of insulin (HCC)    ES[facility] dialysis ([facility]    ESRD on hemodialysis ([facility]    Accelerated hypertension    Diabetic ketoacidosis without coma associated with type 2 diabetes mellitus ([facility]    Medically noncompliant    Delirium    Noncompliance with medications    Hypertensive encephalopathy  Resolved Problems:    ESRD on hemodialysis ([facility]      Plan:     DKA (RESOLVED) on underlying DM type 2 complicated by ESRD on HD all secondary to Medication Noncompliance:  HTN Encephelopathy (RESOLVED) due to HTN Urgency (RESOLVED) due to Medication Noncompliance  Nephro consulted for HD and comanaging - help appreciated  ESRD with near Anuria  Restarting 100mg succinate PO QDay  Restarting hydralazine 50mg PO Q8h  Continue IMDUR 120mg PO QDay   Continue Levothyroxine 150mcg PO QAC   Continue Nifedipene XL 60mg PO QDay   Continue Simvastatin 40mg pO QHS  Continue Clonidine 0.2mg PO TID  Lisinopril 20mg PO BiD - started by Nephro today  -  Is interested in assisted living  Needs: CM, SW, PT, and OT consults     Lethargy alternating with periods of agitation: (resolved)     DVT PPx: Heparin 5,000units Q Q8h     Depression versus Anxiety versus Neuropathy:  Continue cymbalta 60mg PO QDay     Restless Leg Syndrome:  Continue Ropinerole 2mg PO QHS    DVT PPx: Heparin

## 2019-04-22 LAB
ALBUMIN SERPL-MCNC: 3.3 G/DL (ref 3.5–5.2)
ALP BLD-CCNC: 99 U/L (ref 35–104)
ALT SERPL-CCNC: 9 U/L (ref 5–33)
ANION GAP SERPL CALCULATED.3IONS-SCNC: 15 MMOL/L (ref 7–19)
AST SERPL-CCNC: 16 U/L (ref 5–32)
BASOPHILS ABSOLUTE: 0.1 K/UL (ref 0–0.2)
BASOPHILS RELATIVE PERCENT: 1.6 % (ref 0–1)
BILIRUB SERPL-MCNC: 0.3 MG/DL (ref 0.2–1.2)
BUN BLDV-MCNC: 35 MG/DL (ref 6–20)
CALCIUM SERPL-MCNC: 8.7 MG/DL (ref 8.6–10)
CHLORIDE BLD-SCNC: 92 MMOL/L (ref 98–111)
CO2: 26 MMOL/L (ref 22–29)
CREAT SERPL-MCNC: 6.1 MG/DL (ref 0.5–0.9)
EOSINOPHILS ABSOLUTE: 0.3 K/UL (ref 0–0.6)
EOSINOPHILS RELATIVE PERCENT: 6 % (ref 0–5)
GFR NON-AFRICAN AMERICAN: 7
GLUCOSE BLD-MCNC: 116 MG/DL (ref 70–99)
GLUCOSE BLD-MCNC: 120 MG/DL (ref 70–99)
GLUCOSE BLD-MCNC: 152 MG/DL (ref 74–109)
GLUCOSE BLD-MCNC: 180 MG/DL (ref 70–99)
GLUCOSE BLD-MCNC: 274 MG/DL (ref 70–99)
GLUCOSE BLD-MCNC: 311 MG/DL (ref 70–99)
GLUCOSE BLD-MCNC: 95 MG/DL (ref 70–99)
HCT VFR BLD CALC: 30 % (ref 37–47)
HEMOGLOBIN: 9.4 G/DL (ref 12–16)
LYMPHOCYTES ABSOLUTE: 1.7 K/UL (ref 1.1–4.5)
LYMPHOCYTES RELATIVE PERCENT: 29.6 % (ref 20–40)
MCH RBC QN AUTO: 30.5 PG (ref 27–31)
MCHC RBC AUTO-ENTMCNC: 31.3 G/DL (ref 33–37)
MCV RBC AUTO: 97.4 FL (ref 81–99)
MONOCYTES ABSOLUTE: 0.7 K/UL (ref 0–0.9)
MONOCYTES RELATIVE PERCENT: 12.2 % (ref 0–10)
NEUTROPHILS ABSOLUTE: 2.8 K/UL (ref 1.5–7.5)
NEUTROPHILS RELATIVE PERCENT: 50.2 % (ref 50–65)
PDW BLD-RTO: 14.6 % (ref 11.5–14.5)
PERFORMED ON: ABNORMAL
PERFORMED ON: NORMAL
PLATELET # BLD: 139 K/UL (ref 130–400)
PMV BLD AUTO: 10.4 FL (ref 9.4–12.3)
POTASSIUM SERPL-SCNC: 4.6 MMOL/L (ref 3.5–5)
RBC # BLD: 3.08 M/UL (ref 4.2–5.4)
SODIUM BLD-SCNC: 133 MMOL/L (ref 136–145)
TOTAL PROTEIN: 6.1 G/DL (ref 6.6–8.7)
WBC # BLD: 5.6 K/UL (ref 4.8–10.8)

## 2019-04-22 PROCEDURE — 6360000002 HC RX W HCPCS: Performed by: HOSPITALIST

## 2019-04-22 PROCEDURE — 6370000000 HC RX 637 (ALT 250 FOR IP): Performed by: HOSPITALIST

## 2019-04-22 PROCEDURE — 36415 COLL VENOUS BLD VENIPUNCTURE: CPT

## 2019-04-22 PROCEDURE — 92610 EVALUATE SWALLOWING FUNCTION: CPT

## 2019-04-22 PROCEDURE — 92523 SPEECH SOUND LANG COMPREHEN: CPT

## 2019-04-22 PROCEDURE — 1210000000 HC MED SURG R&B

## 2019-04-22 PROCEDURE — 99233 SBSQ HOSP IP/OBS HIGH 50: CPT | Performed by: FAMILY MEDICINE

## 2019-04-22 PROCEDURE — 80053 COMPREHEN METABOLIC PANEL: CPT

## 2019-04-22 PROCEDURE — 2580000003 HC RX 258: Performed by: HOSPITALIST

## 2019-04-22 PROCEDURE — 85025 COMPLETE CBC W/AUTO DIFF WBC: CPT

## 2019-04-22 PROCEDURE — 97161 PT EVAL LOW COMPLEX 20 MIN: CPT

## 2019-04-22 PROCEDURE — 82948 REAGENT STRIP/BLOOD GLUCOSE: CPT

## 2019-04-22 RX ORDER — NIFEDIPINE 60 MG/1
60 TABLET, EXTENDED RELEASE ORAL
Status: DISCONTINUED | OUTPATIENT
Start: 2019-04-23 | End: 2019-04-23 | Stop reason: HOSPADM

## 2019-04-22 RX ORDER — METOPROLOL SUCCINATE 50 MG/1
100 TABLET, EXTENDED RELEASE ORAL
Status: DISCONTINUED | OUTPATIENT
Start: 2019-04-23 | End: 2019-04-23 | Stop reason: HOSPADM

## 2019-04-22 RX ADMIN — INSULIN LISPRO 5 UNITS: 100 INJECTION, SOLUTION INTRAVENOUS; SUBCUTANEOUS at 09:58

## 2019-04-22 RX ADMIN — Medication 10 ML: at 05:51

## 2019-04-22 RX ADMIN — CLONIDINE HYDROCHLORIDE 0.2 MG: 0.2 TABLET ORAL at 14:24

## 2019-04-22 RX ADMIN — CLONIDINE HYDROCHLORIDE 0.2 MG: 0.2 TABLET ORAL at 21:52

## 2019-04-22 RX ADMIN — INSULIN LISPRO 3 UNITS: 100 INJECTION, SOLUTION INTRAVENOUS; SUBCUTANEOUS at 00:48

## 2019-04-22 RX ADMIN — DULOXETINE HYDROCHLORIDE 60 MG: 60 CAPSULE, DELAYED RELEASE ORAL at 09:54

## 2019-04-22 RX ADMIN — LISINOPRIL 20 MG: 20 TABLET ORAL at 22:01

## 2019-04-22 RX ADMIN — INSULIN LISPRO 4 UNITS: 100 INJECTION, SOLUTION INTRAVENOUS; SUBCUTANEOUS at 21:53

## 2019-04-22 RX ADMIN — NIFEDIPINE 60 MG: 60 TABLET, FILM COATED, EXTENDED RELEASE ORAL at 09:54

## 2019-04-22 RX ADMIN — LISINOPRIL 20 MG: 20 TABLET ORAL at 09:54

## 2019-04-22 RX ADMIN — HEPARIN SODIUM 5000 UNITS: 5000 INJECTION INTRAVENOUS; SUBCUTANEOUS at 21:52

## 2019-04-22 RX ADMIN — HEPARIN SODIUM 5000 UNITS: 5000 INJECTION INTRAVENOUS; SUBCUTANEOUS at 14:24

## 2019-04-22 RX ADMIN — LEVOTHYROXINE SODIUM 150 MCG: 75 TABLET ORAL at 05:50

## 2019-04-22 RX ADMIN — METOPROLOL SUCCINATE 100 MG: 50 TABLET, EXTENDED RELEASE ORAL at 09:54

## 2019-04-22 RX ADMIN — CLONIDINE HYDROCHLORIDE 0.2 MG: 0.2 TABLET ORAL at 09:54

## 2019-04-22 RX ADMIN — ISOSORBIDE MONONITRATE 120 MG: 60 TABLET, EXTENDED RELEASE ORAL at 09:54

## 2019-04-22 RX ADMIN — Medication 10 ML: at 09:55

## 2019-04-22 RX ADMIN — HYDRALAZINE HYDROCHLORIDE 50 MG: 50 TABLET, FILM COATED ORAL at 21:52

## 2019-04-22 RX ADMIN — INSULIN GLARGINE 15 UNITS: 100 INJECTION, SOLUTION SUBCUTANEOUS at 21:53

## 2019-04-22 RX ADMIN — HYDRALAZINE HYDROCHLORIDE 50 MG: 50 TABLET, FILM COATED ORAL at 14:24

## 2019-04-22 RX ADMIN — Medication 8.6 MG: at 21:52

## 2019-04-22 RX ADMIN — HEPARIN SODIUM 5000 UNITS: 5000 INJECTION INTRAVENOUS; SUBCUTANEOUS at 05:51

## 2019-04-22 RX ADMIN — Medication 8.6 MG: at 09:54

## 2019-04-22 RX ADMIN — ONDANSETRON 4 MG: 2 INJECTION INTRAMUSCULAR; INTRAVENOUS at 05:50

## 2019-04-22 RX ADMIN — Medication 10 ML: at 21:52

## 2019-04-22 RX ADMIN — ROPINIROLE HYDROCHLORIDE 2 MG: 2 TABLET, FILM COATED ORAL at 21:52

## 2019-04-22 RX ADMIN — HYDRALAZINE HYDROCHLORIDE 50 MG: 50 TABLET, FILM COATED ORAL at 05:50

## 2019-04-22 RX ADMIN — SIMVASTATIN 40 MG: 40 TABLET, FILM COATED ORAL at 21:52

## 2019-04-22 NOTE — PROGRESS NOTES
Spoke with Dr. Marce Sapp about pts multiple antihypertensive meds and Dr. Audi Freire order to not give them all together. Advised that pt states she takes these same medications at home on this schedule. Dr. Marce Sapp states that he will round on pt and assess. Advises that nursing staff could space medications out one hour before and one hour after morning med schedule. Advised pts primary nurse/charge nurse.

## 2019-04-22 NOTE — PROGRESS NOTES
the areas of attention, auditory comprehension, verbal expression, orientation, memory, or reasoning/problem solving. Patient also exhibited functional oral and pharyngeal stages of swallowing with no outward S/S penetration/aspiration observed with any regular solid consistency trial or thin liquid trial administered during evaluation this date. At this time, recommend continuation current diet. Self-feed. No further acute speech therapy is felt to be warranted. Patient to be D/C. Subjective:  General  Chart Reviewed: Yes  Patient assessed for rehabilitation services?: Yes  Family / Caregiver Present: No    Objective:  Oral Motor:  (Structures and related functions were considered to be [facility].)    Auditory Comprehension  Comprehension:  (Patient demonstrated ability to answer yes/no questions of increased complexity and to follow complex 1 step commands at independent level and with adequate processing speed.)    Expression  Primary Mode of Expression:  (Confrontation naming of items in room was considered to be [facility]. Structured responsive speech was considered to be functional. Responses in natural conversation were considered to be timely and appropriate.)    Motor Speech:  (SLP ranked functional intelligibility of speech for unfamiliar listeners at 100% in verbalizations.)    Overall Orientation Status:  (Patient demonstrated ability to verbalize name, birthday, age, address, phone number, city, state, hospital, floor, month, ALEIDA, and date at independent level.)    Attention:  (Patient demonstrated appropriate select and sustained attention during assessment.)    Memory:  (Patient demonstrated appropriate immediate and short-term/working memory during assessment.)    Problem Solving:  (Patient verbalized appropriate solutions to situations that could occur during activities of daily living. )    Additional Assessments:  Assessed patient's swallowing function.  Patient exhibited functional oral prep and

## 2019-04-22 NOTE — PROGRESS NOTES
Nutrition Education    Type and Reason for Visit: Initial, Consult    Patient stated I just don't understand how to put it all together    · Verbally reviewed following information with Patient  · Written educational materials provided. Carb Control (modified), Low Phosphorus, Low Sodium, Renal  · Contact name and number provided. · Refer to Patient Education activity for more details.     Electronically signed by Frank Sifuentes MS, RD, LD on 4/22/19 at 2:39 PM    Contact Number:[phone]

## 2019-04-22 NOTE — PROGRESS NOTES
Hospitalist Progress Note  4/22/2019 3:53 PM  Subjective:   Admit Date: 4/19/2019  PCP: ROMINA Faulkner    Chief Complaint: hypertensive emergency    Subjective: Patient is doing well, denies new problems. Wants to speak with  about assisted living facility placement. Denies headache. Blood pressure remains labile with highs and lows but no acute symptomatology. History is otherwise unchanged. Cumulative Hospital History:   4-20: Mrs. Elliot Montalvo is a 47year old  american lady well known to both the ED and ICU staff. She has had multiple admissions for uncontrolled HTN developeing in to HTN Urgency and Emergency, as well as admission for DKA. She was last admitted here a few weeks ago. Her son had called EMS as she was having tremors and weakness. She reportedly told her D RN that she as drinking. She was noncooperative with the ED staff, and is remaining noncooperative to answer questions to me at this time. 4-21: Mrs. William Rose is a pleasant 47year old  american lady who has had resolution of her hypertensive emergency and associated hypertensive encephalopathy. Her diabetic ketoacidosis has fully resolved. She has been a pleasant and well behaved patient here in the MICU following the resolution of these two problems. She expresses a lack of knowledge and minimal education having been provided to her in the past as to how she should be eating, and as to what she should be doing to manage her diabetes effectively. She has been diabetic since about age 24. She had smoked for a long time but states that she has been completely tobacco free. 4-22: Doing well. Changed administration times of metoprolol and nifedipine. Likely home in the morning after dialysis. ROS: 14 point review of systems is negative except as specifically addressed above. DIET RENAL; Carb Control: 3 carb choices (45 gms)/meal; Low Sodium (2 GM);  Daily Fluid Restriction: 1000 ml; Low Phosphorus, Low Caffeine, Low Cholesterol    Intake/Output Summary (Last 24 hours) at 4/22/2019 1553  Last data filed at 4/22/2019 1250  Gross per 24 hour   Intake 360 ml   Output 260 ml   Net 100 ml     Medications:   dextrose      dextrose 5 % and 0.45 % NaCl       Current Facility-Administered Medications   Medication Dose Route Frequency Provider Last Rate Last Dose    [START ON 4/23/2019] metoprolol succinate (TOPROL XL) extended release tablet 100 mg  100 mg Oral QAM AC [facility], DO        [START ON 4/23/2019] NIFEdipine (PROCARDIA XL) extended release tablet 60 mg  60 mg Oral Daily before lunch [facility], DO        lisinopril (PRINIVIL;ZESTRIL) tablet 20 mg  20 mg Oral BID Stefanie Pedersen MD   20 mg at 04/22/19 0954    senna (SENOKOT) tablet 8.6 mg  1 tablet Oral BID Stefanie Pedersen MD   8.6 mg at 04/22/19 0954    hydrALAZINE (APRESOLINE) tablet 50 mg  50 mg Oral 3 times per day Stefanie Pedersen MD   50 mg at 04/22/19 1424    isosorbide mononitrate (IMDUR) extended release tablet 120 mg  120 mg Oral Daily Stefanie Pedersen MD   120 mg at 04/22/19 0954    metoclopramide (REGLAN) injection 5 mg  5 mg Intravenous Q6H PRN Stefanie Pedersen MD        glucose (GLUTOSE) 40 % oral gel 15 g  15 g Oral PRN Stefanie Pedersen MD        dextrose 50 % solution 12.5 g  12.5 g Intravenous PRN Stefanie Pedersen MD        glucagon (rDNA) injection 1 mg  1 mg Intramuscular PRN Stefanie Pedersen MD        dextrose 5 % solution  100 mL/hr Intravenous PRN Stefanie Pedersen MD        insulin lispro (HUMALOG) injection vial 0-6 Units  0-6 Units Subcutaneous Q4H Stefanie Pedersen MD   3 Units at 04/22/19 0048    sodium chloride flush 0.9 % injection 10 mL  10 mL Intravenous 2 times per day Stefanie Pedersen MD   10 mL at 04/22/19 0955    sodium chloride flush 0.9 % injection 10 mL  10 mL Intravenous PRN Stefanie Pedersen MD   10 mL at 04/22/19 0551    ondansetron (ZOFRAN) injection 4 mg  4 mg Intravenous Q6H PRN Stefanie Pedersen MD   4 mg at 04/22/19 0558    heparin (porcine) injection 5,000 Units  5,000 Units Subcutaneous 3 times per day Bryce Lopez MD   5,000 Units at 04/22/19 1424    acetaminophen (TYLENOL) tablet 650 mg  650 mg Oral Q8H PRN Bryce Lopez MD        cloNIDine (CATAPRES) tablet 0.2 mg  0.2 mg Oral TID Bryce Lopez MD   0.2 mg at 04/22/19 1424    DULoxetine (CYMBALTA) extended release capsule 60 mg  60 mg Oral Daily Bryce Lopez MD   60 mg at 04/22/19 0954    insulin lispro (HUMALOG) injection vial 5 Units  5 Units Subcutaneous TID WC Bryce Lopez MD   5 Units at 04/22/19 0958    insulin glargine (LANTUS) injection vial 15 Units  15 Units Subcutaneous Nightly Bryce Lopez MD   15 Units at 04/21/19 2049    levothyroxine (SYNTHROID) tablet 150 mcg  150 mcg Oral Daily Bryce Lopez MD   150 mcg at 04/22/19 0550    rOPINIRole (REQUIP) tablet 2 mg  2 mg Oral Nightly Bryce Lopez MD   2 mg at 04/21/19 2048    simvastatin (ZOCOR) tablet 40 mg  40 mg Oral Nightly Bryce Lopez MD   40 mg at 04/21/19 2048    albuterol (PROVENTIL) nebulizer solution 2.5 mg  2.5 mg Nebulization Q1H PRN Bryce Lopez MD        dextrose 50 % solution 12.5 g  12.5 g Intravenous PRN Bryce Lopez MD        dextrose 5 % and 0.45 % sodium chloride infusion   Intravenous Continuous PRN Bryce Lopez MD            Labs:     Recent Labs     04/19/19  2300 04/21/19  0343 04/22/19  0318   WBC 6.7 7.1 5.6   RBC 4.18* 3.39* 3.08*   HGB 12.9 10.5* 9.4*   HCT 39.2 32.5* 30.0*   MCV 93.8 95.9 97.4   MCH 30.9 31.0 30.5   MCHC 32.9* 32.3* 31.3*    162 139     Recent Labs     04/20/19  1905 04/21/19  0343 04/22/19  0318    136 133*   K 3.9 4.0 4.6   ANIONGAP 17 15 15   CL 93* 93* 92*   CO2 26 28 26   BUN 18 21* 35*   CREATININE 3.1* 4.2* 6.1*   GLUCOSE 260* 100 152*   CALCIUM 9.1 9.2 8.7     Recent Labs     04/20/19  1240 04/20/19  1905 04/21/19  0343   MG 2.2 2.0  --    PHOS 3.1  --  4.7*     Recent Labs     04/19/19  2300 04/21/19  0343 04/22/19  8385 AST 21 14 16   ALT 15 10 9   BILITOT 0.6 0.3 0.3   ALKPHOS 154* 116* 99     ABGs:No results for input(s): PH, PO2, PCO2, HCO3, BE, O2SAT in the last 72 hours. Troponin T:   Recent Labs     04/20/19  0656   TROPONINI 0.02     INR: No results for input(s): INR in the last 72 hours.   Lactic Acid:   Recent Labs     04/20/19  1240   LACTA 2.3*       Objective:   Vitals: BP (!) 145/78   Pulse 54   Temp 97 °F (36.1 °C) (Temporal)   Resp 18   Ht 5' 6\" (1.676 m)   Wt 140 lb (63.5 kg)   SpO2 100%   BMI 22.60 kg/m²   24HR INTAKE/OUTPUT:      Intake/Output Summary (Last 24 hours) at 4/22/2019 1553  Last data filed at 4/22/2019 1250  Gross per 24 hour   Intake 360 ml   Output 260 ml   Net 100 ml     General appearance: alert and cooperative with exam  HEENT: atraumatic, eyes with clear conjunctiva and normal lids, pupils and irises normal, external ears and nose are normal, lips normal  Neck without masses, lympadenopathy, bruit, thyroid normal  Lungs: no increased work of breathing, diminished breath sounds bilaterally  Heart: regular rate and rhythm, S1, S2 normal, no murmur, click, rub or gallop  Abdomen: soft, non-tender; bowel sounds normal; no masses,  no organomegaly  Extremities: extremities normal, atraumatic, no cyanosis or edema  Neurologic: no focal neurologic deficits, normal sensation, alert and oriented, affect and mood appropriate  Skin: no rashes, nodules    Assessment and Plan:   Principal Problem:    Hypertensive emergency  Active Problems:    Type 2 diabetes mellitus with chronic kidney disease on chronic dialysis, with long-term current use of insulin (HCC)    Hypertensive urgency, malignant    Noncompliance of patient with renal dialysis (HCC)    Nausea and vomiting    Uncontrolled type 2 diabetes mellitus with complication, with long-term current use of insulin (HCC)    ESRD on dialysis ([facility] [address]    [facility]    Accelerated hypertension    Diabetic ketoacidosis without coma associated with type 2 diabetes mellitus ([facility] [address]    Medically noncompliant    Delirium    Noncompliance with medications    Hypertensive encephalopathy  Resolved Problems:    ESRD on hemodialysis ([facility] [address]    Change administration times of metoprolol and nifedipine to prevent the patient receiving all her antihypertensives at the same time each morning. Likely home on clonidine patch tomorrow after hemodialysis. Symptoms cleared after hypertension was treated. Advance Directive: Full Code    DVT prophylaxis: heparin    Discharge planning:  To home pending assisted living      Andrés Dexter, [id number]

## 2019-04-22 NOTE — PLAN OF CARE
Problem: Falls - Risk of:  Goal: Will remain free from falls  Description  Will remain free from falls  4/22/2019 1132 by Purnima Jasso RN  Outcome: Ongoing  4/22/2019 0138 by Marely Keating RN  Outcome: Ongoing  Goal: Absence of physical injury  Description  Absence of physical injury  4/22/2019 1132 by Purnima Jasso RN  Outcome: Ongoing  4/22/2019 0138 by Marely Keating RN  Outcome: Ongoing     Problem: Risk for Impaired Skin Integrity  Goal: Tissue integrity - skin and mucous membranes  Description  Structural intactness and normal physiological function of skin and  mucous membranes.   4/22/2019 1132 by Purnima Jasso RN  Outcome: Ongoing  4/22/2019 0138 by Marely Keating RN  Outcome: Ongoing

## 2019-04-22 NOTE — PROGRESS NOTES
Nephrology ([facility]) Progress Note    Patient:  Jose Armando Figueroa  YOB: 1970  Date of Service: 4/22/2019  MRN: [id number]   Acct: [de-identified]   Primary Care Physician: ROMINA Roman  Advance Directive: Full Code  Admit Date: 4/19/2019       Hospital Day: 2  Referring Provider: Yasmin Rivera DO    Patient independently seen and examined, Chart, Consults, Notes, Operative notes, Labs, Cardiology, and Radiology studies reviewed as able. Subjective:  Jose Armando Figueroa is a 47 y.o. female  whom we were consulted for end-stage renal disease. Patient has diabetic nephropathy and gets dialysis on Tuesday, Thursday, and Saturday. She presented with severe encephalopathy and severe hyperglycemia and DKA. She also has severe systolic and diastolic hypertension which has improved. Patient was initially treated with insulin and IV fluid.     Currently seen on the floor. Anxious for d/c. No f/c/n/v.             Allergies:  Penicillins;  Lamisil advanced [terbinafine]; and Stadol [butorphanol]    Medicines:  Current Facility-Administered Medications   Medication Dose Route Frequency Provider Last Rate Last Dose    lisinopril (PRINIVIL;ZESTRIL) tablet 20 mg  20 mg Oral BID Uriel Clemons MD   20 mg at 04/22/19 0954    senna (SENOKOT) tablet 8.6 mg  1 tablet Oral BID Uriel Clemons MD   8.6 mg at 04/22/19 0954    metoprolol succinate (TOPROL XL) extended release tablet 100 mg  100 mg Oral Daily Uriel Clemons MD   100 mg at 04/22/19 0954    hydrALAZINE (APRESOLINE) tablet 50 mg  50 mg Oral 3 times per day Uriel Clemons MD   50 mg at 04/22/19 0550    isosorbide mononitrate (IMDUR) extended release tablet 120 mg  120 mg Oral Daily Uriel Clemons MD   120 mg at 04/22/19 0954    NIFEdipine (PROCARDIA XL) extended release tablet 60 mg  60 mg Oral Daily Uriel Clemons MD   60 mg at 04/22/19 0954    metoclopramide (REGLAN) injection 5 mg  5 mg Intravenous Q6H PRN MD Erica Stewart glucose (GLUTOSE) 40 % oral gel 15 g  15 g Oral PRN Hernan Turner MD        dextrose 50 % solution 12.5 g  12.5 g Intravenous PRN Hernan Turner MD        glucagon (rDNA) injection 1 mg  1 mg Intramuscular PRN Hernan Turner MD        dextrose 5 % solution  100 mL/hr Intravenous PRN Hernan Turner MD        insulin lispro (HUMALOG) injection vial 0-6 Units  0-6 Units Subcutaneous Q4H Hernan Turner MD   3 Units at 04/22/19 0048    sodium chloride flush 0.9 % injection 10 mL  10 mL Intravenous 2 times per day Hernan Turner MD   10 mL at 04/22/19 0955    sodium chloride flush 0.9 % injection 10 mL  10 mL Intravenous PRN Hernan Turner MD   10 mL at 04/22/19 0551    ondansetron (ZOFRAN) injection 4 mg  4 mg Intravenous Q6H PRN Hernan Turner MD   4 mg at 04/22/19 0550    heparin (porcine) injection 5,000 Units  5,000 Units Subcutaneous 3 times per day Hernan Turner MD   5,000 Units at 04/22/19 0551    acetaminophen (TYLENOL) tablet 650 mg  650 mg Oral Q8H PRN Hernan Turner MD        cloNIDine (CATAPRES) tablet 0.2 mg  0.2 mg Oral TID Hernan Turner MD   0.2 mg at 04/22/19 0954    DULoxetine (CYMBALTA) extended release capsule 60 mg  60 mg Oral Daily Hernan Turner MD   60 mg at 04/22/19 0954    insulin lispro (HUMALOG) injection vial 5 Units  5 Units Subcutaneous TID WC Hernan Turner MD   5 Units at 04/22/19 0958    insulin glargine (LANTUS) injection vial 15 Units  15 Units Subcutaneous Nightly Hernan Turner MD   15 Units at 04/21/19 2049    levothyroxine (SYNTHROID) tablet 150 mcg  150 mcg Oral Daily Hernan Turner MD   150 mcg at 04/22/19 0550    rOPINIRole (REQUIP) tablet 2 mg  2 mg Oral Nightly Hernan Turner MD   2 mg at 04/21/19 2048    simvastatin (ZOCOR) tablet 40 mg  40 mg Oral Nightly Hernan Turner MD   40 mg at 04/21/19 2048    albuterol (PROVENTIL) nebulizer solution 2.5 mg  2.5 mg Nebulization Q1H PRN Hernan Turner MD        dextrose 50 % solution 12.5 g  12.5 g Intravenous PRN Jean Daniels MD        dextrose 5 % and 0.45 % sodium chloride infusion   Intravenous Continuous PRN Jean Daniels MD           Past Medical History:  Past Medical History:   Diagnosis Date    Arthritis     Chronic kidney disease, stage 5, kidney failure (HCC)     DM (diabetes mellitus) type II controlled with renal manifestation ([facility] [address] 06/1993    Gastroparesis     GERD (gastroesophageal reflux disease)     Hemodialysis patient ([facility] [address]     dialysis on tues, thur, and sat [facility]    Hypertension     Hypothyroid     Nasal congestion     recent    Noncompliance with medications     Restless leg syndrome        Past Surgical History:  Past Surgical History:   Procedure Laterality Date    BREAST SURGERY      infected milk gland removed    CHOLECYSTECTOMY      COLONOSCOPY      DIALYSIS FISTULA CREATION Left 1/25/2019    REVISION LEFT UPPER EXTREMITY AV ACCESS WITH LEFT BRACHIAL ARTERY TO LEFT AXILLARY VEIN WITH  INTERPOSITIONAL ARTEGRAFT   performed by Meryle Hodgkins, MD at [address]  2012    GALLBLADDER SURGERY      HC DIALYSIS CATHETER Right 1/25/2019    INSERTION OF RIGHT INTERNAL JUGULAR HEMODIALYSIS CATHETER performed by Meryle Hodgkins, MD at [facility] denies hyst; states ablation    VT COLONOSCOPY W/BIOPSY SINGLE/MULTIPLE N/A 10/20/2017    Dr Rocael Norwood prep-Tubular AP (-) dysplasia x 2--3 yr recall    VT EGD TRANSORAL BIOPSY SINGLE/MULTIPLE N/A 12/27/2016    Dr Lea Jin EGD TRANSORAL BIOPSY SINGLE/MULTIPLE N/A 10/20/2017    Dr Michael Frances (-)    TUBAL LIGATION      VASCULAR SURGERY Left 2016    fistula by Dr Nanci Harrell at [address]  10/02/2017    [facility]. Left upper fistulograms/venograms, balloon angioplasty cephalic vein arch [id number] conquest.    VASCULAR SURGERY  08/2018    FISTULOGRAM    VASCULAR SURGERY  10/29/2018    [facility]. Left upper fistulograms/venograms    VASCULAR SURGERY  12/19/2018    [facility]. Arch abused: Not on file     Forced sexual activity: Not on file   Other Topics Concern    Not on file   Social History Narrative    Not on file         Review of Systems:  History obtained from chart review and the patient  General ROS: No fever or chills  Respiratory ROS: No cough, shortness of breath, wheezing  Cardiovascular ROS: No chest pain or palpitations  Gastrointestinal ROS: No abdominal pain or melena  Genito-Urinary ROS: No dysuria or hematuria  Musculoskeletal ROS: No joint pain or swelling         Objective:  Patient Vitals for the past 24 hrs:   BP Temp Temp src Pulse Resp SpO2 Weight   04/22/19 0810 (!) 145/78 97 °F (36.1 °C) Temporal 54 18 100 % 140 lb (63.5 kg)   04/22/19 0700 (!) 125/58 98.1 °F (36.7 °C) -- 53 18 98 % --   04/22/19 0524 -- -- -- -- -- -- 140 lb (63.5 kg)   04/22/19 0429 134/65 97.6 °F (36.4 °C) Temporal 57 16 97 % --   04/22/19 0010 128/64 98.9 °F (37.2 °C) Temporal 66 16 96 % --   04/21/19 2153 (!) 148/66 97.5 °F (36.4 °C) Temporal 73 16 97 % --   04/21/19 1902 (!) 109/59 97.6 °F (36.4 °C) Temporal 60 18 99 % --   04/21/19 1700 127/63 -- -- 61 14 -- --   04/21/19 1630 (!) 115/56 -- -- 61 13 -- --   04/21/19 1500 (!) 114/58 -- -- 73 13 -- --   04/21/19 1357 (!) 152/68 -- -- -- -- -- --   04/21/19 1200 (!) 119/59 99.3 °F (37.4 °C) Temporal -- -- -- --       Intake/Output Summary (Last 24 hours) at 4/22/2019 1045  Last data filed at 4/22/2019 0904  Gross per 24 hour   Intake 600 ml   Output 160 ml   Net 440 ml     General: awake/alert   Chest:  clear to auscultation bilaterally without respiratory distress  CVS: regular rate and rhythm  Abdominal: soft, nontender, normal bowel sounds  Extremities: no cyanosis or edema  Skin: warm and dry without rash    Labs:  BMP:   Recent Labs     04/20/19  1240 04/20/19  1905 04/21/19  0343 04/22/19  0318   * 136 136 133*   K 4.2 3.9 4.0 4.6   CL 86* 93* 93* 92*   CO2 27 26 28 26   PHOS 3.1  --  4.7*  --    BUN 49* 18 21* 35*   CREATININE 6. 5* 3.1* 4.2* 6.1*   CALCIUM 10.0 9.1 9.2 8.7     CBC:   Recent Labs     04/19/19  2300 04/21/19  0343 04/22/19  0318   WBC 6.7 7.1 5.6   HGB 12.9 10.5* 9.4*   HCT 39.2 32.5* 30.0*   MCV 93.8 95.9 97.4    162 139     LIVER PROFILE:   Recent Labs     04/19/19  2300 04/21/19  0343 04/22/19  0318   AST 21 14 16   ALT 15 10 9   LIPASE 48  --   --    BILITOT 0.6 0.3 0.3   ALKPHOS 154* 116* 99     PT/INR: No results for input(s): PROTIME, INR in the last 72 hours. APTT: No results for input(s): APTT in the last 72 hours. BNP:  No results for input(s): BNP in the last 72 hours. Ionized Calcium:No results for input(s): IONCA in the last 72 hours. Magnesium:  Recent Labs     04/20/19  1240 04/20/19  1905   MG 2.2 2.0     Phosphorus:  Recent Labs     04/20/19  1240 04/21/19  0343   PHOS 3.1 4.7*     HgbA1C:   Recent Labs     04/19/19  2315   LABA1C 10.7*     Hepatic:   Recent Labs     04/19/19  2300 04/21/19  0343 04/22/19  0318   ALKPHOS 154* 116* 99   ALT 15 10 9   AST 21 14 16   PROT 7.4 6.3* 6.1*   BILITOT 0.6 0.3 0.3   LABALBU 4.4 3.4* 3.3*     Lactic Acid:   Recent Labs     04/20/19  1240   LACTA 2.3*     Troponin:   Recent Labs     04/19/19  2300   CKTOTAL 39     ABGs:   Lab Results   Component Value Date    PHART 7.660 04/19/2019    PO2ART 64.0 04/19/2019    ZOW2DOK 19.0 04/19/2019     CRP:  No results for input(s): CRP in the last 72 hours. Sed Rate:  No results for input(s): SEDRATE in the last 72 hours. Culture Results:   Blood Culture Recent: No results for input(s): BC in the last 720 hours. Urine Culture Recent : No results for input(s): LABURIN in the last 720 hours. Radiology reports as per the Radiologist  Radiology: Ct Abdomen Pelvis Wo Contrast Additional Contrast? None    Result Date: 4/20/2019  EXAMINATION: CT ABDOMEN PELVIS WO CONTRAST 4/20/2019 7:44 AM HISTORY: Acute abdominal pain. DOSE: 1234 mGycm.  Automatic exposure control was utilized in an effort to use as little radiation as From 3 days ago there is new interstitial edema predominantly at the bases. No significant pleural effusion. No pneumothorax. Left subclavian vascular stent. No acute osseous abnormalities. Development of interstitial pulmonary edema compared to 3 days prior. Signed by Dr Mariana Carmona on 3/27/2019 12:08 PM    Ct Head Wo Contrast    Result Date: 4/20/2019  EXAMINATION: CT HEAD WO CONTRAST 4/20/2019 7:16 AM HISTORY: Acute encephalopathy. DOSE: 760 mGycm. Automatic exposure control was utilized in an effort to use as little radiation as possible, without compromising image quality. REPORT: Spiral CT of head was performed without contrast, reconstructed coronal and sagittal images are also reviewed. COMPARISON: CT of the head without contrast 4/1/2019. No intracranial hemorrhage, mass or mass effect is identified. The ventricles and basal cisterns are within normal limits. There is mildly decreased attenuation of the periventricular white matter tracts, likely related to mild chronic small vessel white matter ischemic disease. Review of bone windows shows no fractures. There is normal aeration of the visualized paranasal sinuses and mastoid air cells. No acute intracranial abnormality. A preliminary report was provided by the [facility] radiology service. There is no significant discrepancy with the preliminary report. Signed by Dr Kuldeep Kumar. Sanchez on 4/20/2019 7:17 AM    Ct Head Wo Contrast    Result Date: 4/1/2019  Examination. CT HEAD WO CONTRAST History: Altered mental status. DLP: 706 mGycm. CT scan of the head is performed without intravenous contrast enhancement. The images are acquired in axial plane with subsequent reconstruction in coronal and sagittal planes. The comparison is made with the previous study dated 12/8/2017. There is no evidence of a mass or midline shift. The ventricles, the basal cistern and the cortical sulci are normal. There is no evidence of intracranial hemorrhage or hematoma.  There is normal gray-white matter differentiation. The images reviewed in bone window show no acute bony abnormality. Visualized paranasal sinuses are unremarkable. There is poor aeration of the mastoid air cells, right worse than left. DLP: No acute intracranial abnormality. The above study was initially reviewed and reported by [facility]. I do not find any discrepancies. Signed by Dr Tomas Rico on 4/1/2019 7:11 AM    Xr Chest Portable    Result Date: 4/20/2019  EXAMINATION: XR CHEST PORTABLE 4/20/2019 7:18 AM HISTORY: Shortness of breath. Report: Comparison is made with the chest x-ray on 3/27/2019. One-view chest: Frontal view of the chest was obtained. The lungs are clear and normally expanded. The cardiac and mediastinal silhouettes are within normal limits. The visualized bony thorax and upper abdomen are unremarkable. A vascular stent is noted in the left subclavian region as before. No active disease. Signed by Dr Stella Vinson. Sanchez on 4/20/2019 7:19 AM    Xr Chest Portable    Result Date: 3/24/2019  History: 47year-old with shortness of breath. Reference: Chest radiograph January 2019. Findings: Frontal chest radiograph performed. Mild cardiomegaly. The lungs are clear. I suspect tiny left pleural effusion. There is a left subclavian vascular stent. No acute osseous findings. Suspect tiny left pleural effusion. No overt edema/overload.  Signed by Dr Raymond on 3/24/2019 7:51 PM    Vl Dup Carotid Bilateral    Result Date: 4/2/2019  Vascular Carotid Procedure  Demographics   Patient Name     Kevon OLIVEIRA Age                    47   Patient Number   [id number]         Gender                 Female   Visit Number     [id number]      Interpreting Physician Opal Narvaez MD   Date of Birth    1970     Referring Physician    Zander Lacy   Accession Number [id number]      DVIUNRGPHRE            IFLKNH, RVT Erma  Procedure Type of Study:   Cerebral:Carotid, VL CAROTID BILATERAL. Indications for Study:Stroke like symptoms and Bruit. Risk Factors   - The patient's risk factor(s) include: diabetes mellitus and arterial     hypertension.   - The patient has a current/recent (within 1 year) tobacco history. Impression   There is smooth plaque visualized in the right internal carotid  artery(ies). There is no stenosis in the right internal carotid artery. There is heterogeneous plaque visualized in the left internal carotid  artery(ies). There is less than 50% stenosis of the left internal carotid artery. There is normal antegrade flow in the bilateral vertebral artery(ies). Signature   ----------------------------------------------------------------  Electronically signed by Cecile Schrader MD(Interpreting  physician) on 04/02/2019 07:06 AM  ----------------------------------------------------------------  Blood Pressure:Right arm 111/60 mmHg. Velocities are measured in cm/s ; Diameters are measured in mm Carotid Right Measurements +------------+-------+-------+--------+-------+------------+---------------+ ! Location    ! PSV    ! EDV    ! Angle   ! RI     !%Stenosis   ! Tortuosity     ! +------------+-------+-------+--------+-------+------------+---------------+ ! Prox CCA    !102    !8.21   ! 60      !0.92   !            !               ! +------------+-------+-------+--------+-------+------------+---------------+ ! Mid CCA     !93.8   !11.1   ! 60      !0.88   !            !               ! +------------+-------+-------+--------+-------+------------+---------------+ ! Prox ICA    !51.9   !11.8   !60      !0.77   !            !               ! +------------+-------+-------+--------+-------+------------+---------------+ ! Mid ICA     !71.1   !19.3   !60      !0.73   !            !               ! +------------+-------+-------+--------+-------+------------+---------------+ vertebral flow noted on the left side. - Additional Measurements:ICAPSV/CCAPSV 1. ICAEDV/CCAEDV 1.65. Cta Pulmonary W Contrast    Result Date: 3/27/2019  CTA PULMONARY W CONTRAST 3/27/2019 10:45 AM HISTORY: Dyspnea, clinical concerns for pulmonary embolus Comparison: 2/11/2019 chest CT Technique: CT evaluation of the chest was performed with intravenous contrast. 2 mm transaxial images were obtained. Maximum intensity projection reconstruction angiographic images were generated . Two-dimensional Coronal and sagittal reconstruction images of the pulmonary arteries, aorta and great vessels were also generated. .Radiation dose equals  mGy cm. AUTOMATED EXPOSURE CONTROL dose reduction technique was implemented Findings: There are small to moderate-sized bilateral pleural effusions, layering posteriorly. There is reticular nodular interstitial changes diffusely in the lungs with peribronchial cuffing. There are patchy groundglass opacities in the upper lobes with more confluent groundglass opacities in the lower lobes. There is cardiomegaly with panchamber enlargement. There is diffuse body wall edema with anasarca change. Differential considerations include pulmonary edema/congestive heart failure-volume overload. The mediastinum appears edematous with diffuse low-attenuation changes without definite lymphadenopathy. Peribronchial thickening noted in the hilar regions without significant lymph node enlargement. Calcified left hilar lymph nodes appreciated. There is mild reflux of contrast material into the inferior vena cava and hepatic veins suggesting elevated right heart pressures. The pulmonary arteries opacify without iodinated contrast without intraluminal filling defects or CT angiographic evidence of pulmonary embolus. Aorta is normal in caliber, without aneurysm or dissection. Left-sided subclavian venous stent is observed. Limited imaging of the upper abdomen is unremarkable.  No acute osseous

## 2019-04-22 NOTE — PROGRESS NOTES
and vascular surgery (03/06/2019). Restrictions     Vision/Hearing  Vision: Within Functional Limits  Hearing: Within functional limits     Subjective  General  Chart Reviewed: Yes  Patient assessed for rehabilitation services?: Yes  Additional Pertinent Hx: DM  Family / Caregiver Present: No  Diagnosis: Accelerated hypertension  Follows Commands: Within Functional Limits  Subjective  Subjective: pt was agreeable and willing to participate  Pain Screening  Patient Currently in Pain: Denies  Vital Signs  Patient Currently in Pain: Denies       Orientation  Orientation  Overall Orientation Status: Within Normal Limits  Social/Functional History  Social/Functional History  Lives With: Significant other  Type of Home: Trailer  Home Layout: One level  Home Access: Stairs to enter without rails  Entrance Stairs - Number of Steps: 3  Bathroom Shower/Tub: Tub/Shower unit  Bathroom Equipment: Grab bars in shower  Home Equipment: Cane(shares a cane with SO)  Receives Help From: Family  ADL Assistance: Independent  Ambulation Assistance: Independent  Transfer Assistance: Independent  Additional Comments: pt states she has been using a cane when she cane to ambulate for stability especially in the uneven surroundings around home but has to share with her SO.   Cognition   Cognition  Overall Cognitive Status: WFL    Objective          AROM RLE (degrees)  RLE AROM: WFL  AROM LLE (degrees)  LLE AROM : WFL  Strength RLE  Strength RLE: WFL  Strength LLE  Strength LLE: WFL     Sensation  Overall Sensation Status: Impaired  Light Touch: Partial deficits in the RLE;Partial deficits in the LLE(decreased sensation in B feet)  Bed mobility  Comment: pt presented up in chair finishing breakfast; pt staes she was able to get up on own and take a shower this morning just requiring increased time due to dizziness with postional changes  Transfers  Sit to Stand: Stand by assistance  Stand to sit: Stand by assistance  Comment: pt demonstrated

## 2019-04-22 NOTE — PROGRESS NOTES
BMI 18.5 - 24.9 Normal Weight    Nutrition Interventions:   Continue current diet  Continued Inpatient Monitoring, Education Initiated, Education Completed    Nutrition Evaluation:   · Evaluation: Goals set   · Goals: be able to plan meal using educational material given    · Monitoring: Meal Intake, Diet Tolerance, Skin Integrity, Weight, Pertinent Labs, Patient/Family Education      Electronically signed by Roger Arana MS, RD, LD on 4/22/19 at 2:38 PM    Contact Number: [phone]

## 2019-04-23 VITALS
HEIGHT: 66 IN | BODY MASS INDEX: 24.19 KG/M2 | TEMPERATURE: 98.1 F | SYSTOLIC BLOOD PRESSURE: 151 MMHG | WEIGHT: 150.5 LBS | RESPIRATION RATE: 16 BRPM | DIASTOLIC BLOOD PRESSURE: 73 MMHG | HEART RATE: 56 BPM | OXYGEN SATURATION: 97 %

## 2019-04-23 PROBLEM — I67.4 HYPERTENSIVE ENCEPHALOPATHY: Status: RESOLVED | Noted: 2019-04-20 | Resolved: 2019-04-23

## 2019-04-23 PROBLEM — R41.0 DELIRIUM: Status: RESOLVED | Noted: 2019-04-20 | Resolved: 2019-04-23

## 2019-04-23 PROBLEM — E11.10 DIABETIC KETOACIDOSIS WITHOUT COMA ASSOCIATED WITH TYPE 2 DIABETES MELLITUS (HCC): Status: RESOLVED | Noted: 2019-04-20 | Resolved: 2019-04-23

## 2019-04-23 LAB
ANION GAP SERPL CALCULATED.3IONS-SCNC: 15 MMOL/L (ref 7–19)
BASOPHILS ABSOLUTE: 0.1 K/UL (ref 0–0.2)
BASOPHILS RELATIVE PERCENT: 1.4 % (ref 0–1)
BUN BLDV-MCNC: 48 MG/DL (ref 6–20)
CALCIUM SERPL-MCNC: 8.9 MG/DL (ref 8.6–10)
CHLORIDE BLD-SCNC: 91 MMOL/L (ref 98–111)
CO2: 27 MMOL/L (ref 22–29)
CREAT SERPL-MCNC: 8.2 MG/DL (ref 0.5–0.9)
EOSINOPHILS ABSOLUTE: 0.4 K/UL (ref 0–0.6)
EOSINOPHILS RELATIVE PERCENT: 8.9 % (ref 0–5)
GFR NON-AFRICAN AMERICAN: 5
GLUCOSE BLD-MCNC: 138 MG/DL (ref 70–99)
GLUCOSE BLD-MCNC: 140 MG/DL (ref 74–109)
GLUCOSE BLD-MCNC: 150 MG/DL (ref 70–99)
GLUCOSE BLD-MCNC: 309 MG/DL (ref 70–99)
GLUCOSE BLD-MCNC: 67 MG/DL (ref 70–99)
HCT VFR BLD CALC: 30 % (ref 37–47)
HEMOGLOBIN: 9.4 G/DL (ref 12–16)
LYMPHOCYTES ABSOLUTE: 1.6 K/UL (ref 1.1–4.5)
LYMPHOCYTES RELATIVE PERCENT: 31.8 % (ref 20–40)
MCH RBC QN AUTO: 30.1 PG (ref 27–31)
MCHC RBC AUTO-ENTMCNC: 31.3 G/DL (ref 33–37)
MCV RBC AUTO: 96.2 FL (ref 81–99)
MONOCYTES ABSOLUTE: 0.6 K/UL (ref 0–0.9)
MONOCYTES RELATIVE PERCENT: 11.5 % (ref 0–10)
NEUTROPHILS ABSOLUTE: 2.3 K/UL (ref 1.5–7.5)
NEUTROPHILS RELATIVE PERCENT: 46.2 % (ref 50–65)
PDW BLD-RTO: 14.5 % (ref 11.5–14.5)
PERFORMED ON: ABNORMAL
PLATELET # BLD: 123 K/UL (ref 130–400)
PMV BLD AUTO: 10.1 FL (ref 9.4–12.3)
POTASSIUM SERPL-SCNC: 5 MMOL/L (ref 3.5–5)
RBC # BLD: 3.12 M/UL (ref 4.2–5.4)
SODIUM BLD-SCNC: 133 MMOL/L (ref 136–145)
WBC # BLD: 5 K/UL (ref 4.8–10.8)

## 2019-04-23 PROCEDURE — 80048 BASIC METABOLIC PNL TOTAL CA: CPT

## 2019-04-23 PROCEDURE — 6370000000 HC RX 637 (ALT 250 FOR IP): Performed by: HOSPITALIST

## 2019-04-23 PROCEDURE — 8010000000 HC HEMODIALYSIS ACUTE INPT

## 2019-04-23 PROCEDURE — 82948 REAGENT STRIP/BLOOD GLUCOSE: CPT

## 2019-04-23 PROCEDURE — 36415 COLL VENOUS BLD VENIPUNCTURE: CPT

## 2019-04-23 PROCEDURE — 6360000002 HC RX W HCPCS: Performed by: INTERNAL MEDICINE

## 2019-04-23 PROCEDURE — 85025 COMPLETE CBC W/AUTO DIFF WBC: CPT

## 2019-04-23 PROCEDURE — 99238 HOSP IP/OBS DSCHRG MGMT 30/<: CPT | Performed by: FAMILY MEDICINE

## 2019-04-23 PROCEDURE — 6370000000 HC RX 637 (ALT 250 FOR IP): Performed by: FAMILY MEDICINE

## 2019-04-23 PROCEDURE — 6360000002 HC RX W HCPCS: Performed by: HOSPITALIST

## 2019-04-23 RX ORDER — CLONIDINE 0.3 MG/24H
1 PATCH, EXTENDED RELEASE TRANSDERMAL
Qty: 4 PATCH | Refills: 0 | Status: SHIPPED | OUTPATIENT
Start: 2019-04-23 | End: 2019-08-28

## 2019-04-23 RX ORDER — HYDRALAZINE HYDROCHLORIDE 20 MG/ML
5 INJECTION INTRAMUSCULAR; INTRAVENOUS EVERY 6 HOURS PRN
Status: DISCONTINUED | OUTPATIENT
Start: 2019-04-23 | End: 2019-04-23 | Stop reason: HOSPADM

## 2019-04-23 RX ORDER — METOPROLOL SUCCINATE 100 MG/1
100 TABLET, EXTENDED RELEASE ORAL
Qty: 30 TABLET | Refills: 0 | Status: ON HOLD | OUTPATIENT
Start: 2019-04-24 | End: 2021-11-25 | Stop reason: HOSPADM

## 2019-04-23 RX ORDER — LISINOPRIL 20 MG/1
20 TABLET ORAL 2 TIMES DAILY
Qty: 30 TABLET | Refills: 0 | Status: ON HOLD | OUTPATIENT
Start: 2019-04-23 | End: 2022-04-05 | Stop reason: HOSPADM

## 2019-04-23 RX ADMIN — HEPARIN SODIUM 5000 UNITS: 5000 INJECTION INTRAVENOUS; SUBCUTANEOUS at 05:27

## 2019-04-23 RX ADMIN — ISOSORBIDE MONONITRATE 120 MG: 60 TABLET, EXTENDED RELEASE ORAL at 11:47

## 2019-04-23 RX ADMIN — HYDRALAZINE HYDROCHLORIDE 5 MG: 20 INJECTION INTRAMUSCULAR; INTRAVENOUS at 00:58

## 2019-04-23 RX ADMIN — ACETAMINOPHEN 650 MG: 325 TABLET ORAL at 11:48

## 2019-04-23 RX ADMIN — NIFEDIPINE 60 MG: 60 TABLET, FILM COATED, EXTENDED RELEASE ORAL at 11:48

## 2019-04-23 RX ADMIN — LEVOTHYROXINE SODIUM 150 MCG: 75 TABLET ORAL at 05:27

## 2019-04-23 RX ADMIN — DULOXETINE HYDROCHLORIDE 60 MG: 60 CAPSULE, DELAYED RELEASE ORAL at 11:48

## 2019-04-23 RX ADMIN — LISINOPRIL 20 MG: 20 TABLET ORAL at 05:27

## 2019-04-23 RX ADMIN — Medication 8.6 MG: at 11:48

## 2019-04-23 RX ADMIN — HYDRALAZINE HYDROCHLORIDE 50 MG: 50 TABLET, FILM COATED ORAL at 05:27

## 2019-04-23 RX ADMIN — CLONIDINE HYDROCHLORIDE 0.2 MG: 0.2 TABLET ORAL at 11:48

## 2019-04-23 RX ADMIN — INSULIN LISPRO 4 UNITS: 100 INJECTION, SOLUTION INTRAVENOUS; SUBCUTANEOUS at 00:13

## 2019-04-23 ASSESSMENT — PAIN SCALES - GENERAL
PAINLEVEL_OUTOF10: 9
PAINLEVEL_OUTOF10: 6

## 2019-04-23 NOTE — DISCHARGE SUMMARY
Lars Tian  :  1970  MRN:  [id number]    Admit date:  2019  Discharge date:      Admitting Physician:  Kamilah Zarate DO    Advance Directive: Full Code    Consults: nephrology    Primary Care Physician:  ROMINA Cohen    Discharge Diagnoses:  Principal Problem (Resolved): Hypertensive emergency  Active Problems:    Type 2 diabetes mellitus with chronic kidney disease on chronic dialysis, with long-term current use of insulin ([address]    Noncompliance of patient with renal dialysis ([address]    Nausea and vomiting    Uncontrolled type 2 diabetes mellitus with complication, with long-term current use of insulin (HCC)    ESRD on dialysis ([address]    ESRD on hemodialysis ([address]    Accelerated hypertension    Medically noncompliant    Noncompliance with medications  Resolved Problems:    Hypertensive urgency, malignant    ESRD on hemodialysis ([address]    Diabetic ketoacidosis without coma associated with type 2 diabetes mellitus ([address]    Delirium    Hypertensive encephalopathy      Significant Diagnostic Studies:   Ct Abdomen Pelvis Wo Contrast Additional Contrast? None    Result Date: 2019  EXAMINATION: CT ABDOMEN PELVIS WO CONTRAST 2019 7:44 AM HISTORY: Acute abdominal pain. DOSE: 1234 mGycm. Automatic exposure control was utilized in an effort to use as little radiation as possible, without compromising image quality. REPORT: Spiral CT of the abdomen and pelvis was performed without contrast from the lung bases through the pubic symphysis. Reconstructed coronal and sagittal images are also reviewed. COMPARISON: CT of the abdomen and pelvis without contrast 10/23/2017. Review of lung windows demonstrates mild bibasilar atelectasis. There is respiratory motion artifact. Cholecystectomy clips are present. The liver and spleen are grossly normal on this unenhanced study. The stomach is poorly distended. There is advanced renal atrophy bilaterally, no hydronephrosis is identified.  There is increased attenuation of body wall fat planes diffusely compatible with third spacing of fluid in mild anasarca. The rectum is dilated due to a large stool ball. Fecal impaction is not excluded. There is at least mild rectal wall thickening. No evidence of bowel obstruction is identified. Bowel loops are difficult to evaluate due to motion and the lack of oral contrast. No evidence of appendicitis is identified. There is no free fluid or free air. Review of bone windows is unremarkable. 1. Limited study due to motion artifact and the lack of contrast. Fecal impaction at the rectal level with mild thickening of the wall the rectum. No evidence of bowel obstruction. No free fluid or free air. 2. No evidence of appendicitis. 3. Increased attenuation of body wall fat planes compatible with generalized volume overload and mild anasarca. Moderate renal atrophy as before. 4. Evidence of previous cholecystectomy. A preliminary report was provided by the [facility] radiology service. There is no significant discrepancy with the preliminary report. Signed by Dr Herberth Bradford on 4/20/2019 7:50 AM    Ct Head Wo Contrast    Result Date: 4/20/2019  EXAMINATION: CT HEAD WO CONTRAST 4/20/2019 7:16 AM HISTORY: Acute encephalopathy. DOSE: 760 mGycm. Automatic exposure control was utilized in an effort to use as little radiation as possible, without compromising image quality. REPORT: Spiral CT of head was performed without contrast, reconstructed coronal and sagittal images are also reviewed. COMPARISON: CT of the head without contrast 4/1/2019. No intracranial hemorrhage, mass or mass effect is identified. The ventricles and basal cisterns are within normal limits. There is mildly decreased attenuation of the periventricular white matter tracts, likely related to mild chronic small vessel white matter ischemic disease. Review of bone windows shows no fractures.  There is normal aeration of the visualized paranasal sinuses and mastoid air cells. No acute intracranial abnormality. A preliminary report was provided by the [facility] radiology service. There is no significant discrepancy with the preliminary report. Signed by Dr Valerie Bradford on 4/20/2019 7:17 AM    Xr Chest Portable    Result Date: 4/20/2019  EXAMINATION: XR CHEST PORTABLE 4/20/2019 7:18 AM HISTORY: Shortness of breath. Report: Comparison is made with the chest x-ray on 3/27/2019. One-view chest: Frontal view of the chest was obtained. The lungs are clear and normally expanded. The cardiac and mediastinal silhouettes are within normal limits. The visualized bony thorax and upper abdomen are unremarkable. A vascular stent is noted in the left subclavian region as before. No active disease. Signed by Dr Valerie Marin. Sanchez on 4/20/2019 7:19 AM      Pertinent Labs:   CBC:   Recent Labs     04/21/19  0343 04/22/19  0318 04/23/19  0330   WBC 7.1 5.6 5.0   HGB 10.5* 9.4* 9.4*    139 123*     BMP:    Recent Labs     04/21/19  0343 04/22/19  0318 04/23/19  0330    133* 133*   K 4.0 4.6 5.0   CL 93* 92* 91*   CO2 28 26 27   BUN 21* 35* 48*   CREATININE 4.2* 6.1* 8.2*   GLUCOSE 100 152* 140*     INR: No results for input(s): INR in the last 72 hours. ABGs:No results for input(s): PH, PO2, PCO2, HCO3, BE, O2SAT in the last 72 hours. Lactic Acid:  Recent Labs     04/20/19  1240   LACTA 2.3*       Procedures: None performed. Hospital Course:   4-20: Mrs. Celestino Lai is a 47year old  american lady well known to both the ED and ICU staff. She has had multiple admissions for uncontrolled HTN developeing in to HTN Urgency and Emergency, as well as admission for DKA. She was last admitted here a few weeks ago. Her son had called EMS as she was having tremors and weakness. She reportedly told her D RN that she as drinking. DIET RENAL; Carb Control: 3 carb choices (45 gms)/meal; Low Sodium (2 GM); Daily Fluid Restriction: 1000 ml; Low Phosphorus, Low Caffeine, Low Cholesterol     Disposition: Patient is medically stable and will be discharged Home. Time spent on discharge less than 30 minutes.     Signed:  [facility]

## 2019-04-23 NOTE — PROGRESS NOTES
CLINICAL PHARMACY NOTE: MEDS TO [address] Select Patient?: No  Total # of Prescriptions Filled: 3   The following medications were delivered to the patient:  · Lisinopril 20mg  · Clonidine 0.3mg/day patch  · Metoprolol succ ER 100mg   Total # of Interventions Completed: 0  Time Spent (min): 30     Additional Documentation:

## 2019-04-23 NOTE — PLAN OF CARE
Problem: Falls - Risk of:  Goal: Will remain free from falls  Description  Will remain free from falls  Outcome: Ongoing  Goal: Absence of physical injury  Description  Absence of physical injury  Outcome: Ongoing     Problem: Risk for Impaired Skin Integrity  Goal: Tissue integrity - skin and mucous membranes  Description  Structural intactness and normal physiological function of skin and  mucous membranes.   Outcome: Ongoing   Electronically signed by Reena Rosas RN on 4/23/2019 at 2:18 AM

## 2019-04-23 NOTE — CARE COORDINATION
RUPERTO placed call to [employer] ([phone])  to set up transit home for Pt today as she is being d/c'd.    RUPERTO spoke with Gamaliel Pate to establish ride; pick-up time set for 1:00PM at [facility] entrance; they will call [phone] when they arrive; confirmation #[id number]    Electronically signed by Diana Butcher on 4/23/2019 at 11:44 AM

## 2019-04-23 NOTE — CARE COORDINATION
SW attempted visit with Pt this AM to discuss [facility] as she is interested in this option for after she goes home.   Pt still in dialysis; will f/u when she returns to her room    Electronically signed by Diana Lopes on 4/23/2019 at 11:34 AM

## 2019-04-23 NOTE — PROGRESS NOTES
Nephrology ([facility]) Progress Note    Patient:  Jc Ndiaye  YOB: 1970  Date of Service: 4/23/2019  MRN: [id number]   Acct: [de-identified]   Primary Care Physician: Sarajane Mcardle, APRN  Advance Directive: Full Code  Admit Date: 4/19/2019       Hospital Day: 3  Referring Provider: Randall Ji DO    Patient independently seen and examined, Chart, Consults, Notes, Operative notes, Labs, Cardiology, and Radiology studies reviewed as able. Subjective:  Jc Ndiaye is a 47 y.o. female  whom we were consulted for end-stage renal disease. Patient has diabetic nephropathy and gets dialysis on Tuesday, Thursday, and Saturday. She presented with severe encephalopathy and severe hyperglycemia and DKA. She also has severe systolic and diastolic hypertension which has improved. Patient was initially treated with insulin and IV fluid.     Currently seen on HD. Anxious for d/c. No f/c/n/v. Dialysis   Pt was seen on RRT  Modality: Hemodialysis  Access: Arterial Venous Fistula  Location: left upper  QB: 450  QD: 700  UF: 1000             Allergies:  Penicillins;  Lamisil advanced [terbinafine]; and Stadol [butorphanol]    Medicines:  Current Facility-Administered Medications   Medication Dose Route Frequency Provider Last Rate Last Dose    hydrALAZINE (APRESOLINE) injection 5 mg  5 mg Intravenous Q6H PRN Tucker Ramírez MD   5 mg at 04/23/19 0058    metoprolol succinate (TOPROL XL) extended release tablet 100 mg  100 mg Oral QAM AC Paul Padilla, DO        NIFEdipine (PROCARDIA XL) extended release tablet 60 mg  60 mg Oral Daily before lunch Paul Padilla, DO        lisinopril (PRINIVIL;ZESTRIL) tablet 20 mg  20 mg Oral BID Radhika Espinoza MD   20 mg at 04/23/19 5555    senna (SENOKOT) tablet 8.6 mg  1 tablet Oral BID Radhika Espinoza MD   8.6 mg at 04/22/19 5248    hydrALAZINE (APRESOLINE) tablet 50 mg  50 mg Oral 3 times per day Radhika Espinoza MD   50 mg at 04/23/19 1258    isosorbide mononitrate (IMDUR) extended release tablet 120 mg  120 mg Oral Daily Tereso Wilson MD   120 mg at 04/22/19 0954    metoclopramide (REGLAN) injection 5 mg  5 mg Intravenous Q6H PRN Tereso Wilson MD        glucose (GLUTOSE) 40 % oral gel 15 g  15 g Oral PRN Tereso Wilson MD        dextrose 50 % solution 12.5 g  12.5 g Intravenous PRN Tereso Wilson MD        glucagon (rDNA) injection 1 mg  1 mg Intramuscular PRN Tereso Wilson MD        dextrose 5 % solution  100 mL/hr Intravenous PRN Tereso Wilson MD        insulin lispro (HUMALOG) injection vial 0-6 Units  0-6 Units Subcutaneous Q4H Tereso Wilson MD   4 Units at 04/23/19 0013    sodium chloride flush 0.9 % injection 10 mL  10 mL Intravenous 2 times per day Tereso Wilson MD   10 mL at 04/22/19 2152    sodium chloride flush 0.9 % injection 10 mL  10 mL Intravenous PRN Tereso Wilson MD   10 mL at 04/22/19 0551    ondansetron (ZOFRAN) injection 4 mg  4 mg Intravenous Q6H PRN Tereso Wilson MD   4 mg at 04/22/19 0550    heparin (porcine) injection 5,000 Units  5,000 Units Subcutaneous 3 times per day Tereso Wilson MD   5,000 Units at 04/23/19 0527    acetaminophen (TYLENOL) tablet 650 mg  650 mg Oral Q8H PRN Tereso Wilson MD        cloNIDine (CATAPRES) tablet 0.2 mg  0.2 mg Oral TID Tereso Wilson MD   0.2 mg at 04/22/19 2152    DULoxetine (CYMBALTA) extended release capsule 60 mg  60 mg Oral Daily Tereso Wilson MD   60 mg at 04/22/19 0954    insulin lispro (HUMALOG) injection vial 5 Units  5 Units Subcutaneous TID WC Tereso Wilson MD   5 Units at 04/22/19 0958    insulin glargine (LANTUS) injection vial 15 Units  15 Units Subcutaneous Nightly Tereso Wilson MD   15 Units at 04/22/19 2153    levothyroxine (SYNTHROID) tablet 150 mcg  150 mcg Oral Daily Tereso Wilson MD   150 mcg at 04/23/19 0527    rOPINIRole (REQUIP) tablet 2 mg  2 mg Oral Nightly Tereso Wilson MD   2 mg at 04/22/19 5871    simvastatin (ZOCOR) tablet 40 mg  40 angioplasty cephalic vein arch [id number] conquest.    VASCULAR SURGERY  2018    FISTULOGRAM    VASCULAR SURGERY  10/29/2018    [facility]. Left upper fistulograms/venograms    VASCULAR SURGERY  2018    [facility]. Arch aortogram,left upper arteriograms.  VASCULAR SURGERY  2019    [facility]. Removal of tunneled dialyis catheter right internal jugular vein.        Family History  Family History   Problem Relation Age of Onset    Colon Cancer Mother          at 63    Colon Polyps Mother     Lung Cancer Father          at 66    Colon Polyps Father     Stomach Cancer Sister     Breast Cancer Sister     Depression Daughter 25        committed suicide    Liver Cancer Neg Hx     Liver Disease Neg Hx     Esophageal Cancer Neg Hx     Rectal Cancer Neg Hx        Social History  Social History     Socioeconomic History    Marital status:      Spouse name: Not on file    Number of children: 2    Years of education: Not on file    Highest education level: Not on file   Occupational History    Not on file   Social Needs    Financial resource strain: Not on file    Food insecurity:     Worry: Not on file     Inability: Not on file    Transportation needs:     Medical: Not on file     Non-medical: Not on file   Tobacco Use    Smoking status: Current Every Day Smoker     Packs/day: 0.50     Years: 25.00     Pack years: 12.50     Types: Cigarettes     Last attempt to quit: 2017     Years since quittin.9    Smokeless tobacco: Never Used   Substance and Sexual Activity    Alcohol use: No    Drug use: No    Sexual activity: Not Currently     Partners: Male   Lifestyle    Physical activity:     Days per week: Not on file     Minutes per session: Not on file    Stress: Not on file   Relationships    Social connections:     Talks on phone: Not on file     Gets together: Not on file     Attends Gnosticism service: Not on file     Active member of club or organization: Not on file     Attends meetings of clubs or organizations: Not on file     Relationship status: Not on file    Intimate partner violence:     Fear of current or ex partner: Not on file     Emotionally abused: Not on file     Physically abused: Not on file     Forced sexual activity: Not on file   Other Topics Concern    Not on file   Social History Narrative    Not on file         Review of Systems:  History obtained from chart review and the patient  General ROS: No fever or chills  Respiratory ROS: No cough, shortness of breath, wheezing  Cardiovascular ROS: No chest pain or palpitations  Gastrointestinal ROS: No abdominal pain or melena  Genito-Urinary ROS: No dysuria or hematuria  Musculoskeletal ROS: No joint pain or swelling         Objective:  Patient Vitals for the past 24 hrs:   BP Temp Temp src Pulse Resp SpO2 Weight   04/23/19 0634 (!) 151/73 98.1 °F (36.7 °C) Temporal 58 16 97 % --   04/23/19 0524 (!) 176/72 98.1 °F (36.7 °C) Temporal 60 16 98 % 150 lb 8 oz (68.3 kg)   04/23/19 0158 (!) 198/82 -- -- 63 14 97 % --   04/23/19 0037 (!) 210/82 98.6 °F (37 °C) Temporal 66 16 98 % --   04/22/19 2023 (!) 168/72 98 °F (36.7 °C) Temporal 63 14 98 % --       Intake/Output Summary (Last 24 hours) at 4/23/2019 1103  Last data filed at 4/22/2019 1250  Gross per 24 hour   Intake --   Output 100 ml   Net -100 ml     General: awake/alert   Chest:  clear to auscultation bilaterally without respiratory distress  CVS: regular rate and rhythm  Abdominal: soft, nontender, normal bowel sounds  Extremities: no cyanosis or edema  Skin: warm and dry without rash    Labs:  BMP:   Recent Labs     04/20/19  1240  04/21/19  0343 04/22/19  0318 04/23/19  0330   *   < > 136 133* 133*   K 4.2   < > 4.0 4.6 5.0   CL 86*   < > 93* 92* 91*   CO2 27   < > 28 26 27   PHOS 3.1  --  4.7*  --   --    BUN 49*   < > 21* 35* 48*   CREATININE 6.5*   < > 4.2* 6.1* 8.2*   CALCIUM 10.0   < > 9.2 8.7 8.9    < > = values in this interval not displayed.      CBC: Recent Labs     04/21/19  0343 04/22/19  0318 04/23/19  0330   WBC 7.1 5.6 5.0   HGB 10.5* 9.4* 9.4*   HCT 32.5* 30.0* 30.0*   MCV 95.9 97.4 96.2    139 123*     LIVER PROFILE:   Recent Labs     04/21/19  0343 04/22/19  0318   AST 14 16   ALT 10 9   BILITOT 0.3 0.3   ALKPHOS 116* 99     PT/INR: No results for input(s): PROTIME, INR in the last 72 hours. APTT: No results for input(s): APTT in the last 72 hours. BNP:  No results for input(s): BNP in the last 72 hours. Ionized Calcium:No results for input(s): IONCA in the last 72 hours. Magnesium:  Recent Labs     04/20/19  1240 04/20/19  1905   MG 2.2 2.0     Phosphorus:  Recent Labs     04/20/19  1240 04/21/19  0343   PHOS 3.1 4.7*     HgbA1C:   No results for input(s): LABA1C in the last 72 hours. Hepatic:   Recent Labs     04/21/19  0343 04/22/19  0318   ALKPHOS 116* 99   ALT 10 9   AST 14 16   PROT 6.3* 6.1*   BILITOT 0.3 0.3   LABALBU 3.4* 3.3*     Lactic Acid:   Recent Labs     04/20/19  1240   LACTA 2.3*     Troponin:   No results for input(s): CKTOTAL, CKMB, TROPONINT in the last 72 hours. ABGs:   Lab Results   Component Value Date    PHART 7.660 04/19/2019    PO2ART 64.0 04/19/2019    VMR8MVD 19.0 04/19/2019     CRP:  No results for input(s): CRP in the last 72 hours. Sed Rate:  No results for input(s): SEDRATE in the last 72 hours. Culture Results:   Blood Culture Recent: No results for input(s): BC in the last 720 hours. Urine Culture Recent : No results for input(s): LABURIN in the last 720 hours. Radiology reports as per the Radiologist  Radiology: Ct Abdomen Pelvis Wo Contrast Additional Contrast? None    Result Date: 4/20/2019  EXAMINATION: CT ABDOMEN PELVIS WO CONTRAST 4/20/2019 7:44 AM HISTORY: Acute abdominal pain. DOSE: 1234 mGycm. Automatic exposure control was utilized in an effort to use as little radiation as possible, without compromising image quality.  REPORT: Spiral CT of the abdomen and pelvis was performed without contrast from the lung bases through the pubic symphysis. Reconstructed coronal and sagittal images are also reviewed. COMPARISON: CT of the abdomen and pelvis without contrast 10/23/2017. Review of lung windows demonstrates mild bibasilar atelectasis. There is respiratory motion artifact. Cholecystectomy clips are present. The liver and spleen are grossly normal on this unenhanced study. The stomach is poorly distended. There is advanced renal atrophy bilaterally, no hydronephrosis is identified. There is increased attenuation of body wall fat planes diffusely compatible with third spacing of fluid in mild anasarca. The rectum is dilated due to a large stool ball. Fecal impaction is not excluded. There is at least mild rectal wall thickening. No evidence of bowel obstruction is identified. Bowel loops are difficult to evaluate due to motion and the lack of oral contrast. No evidence of appendicitis is identified. There is no free fluid or free air. Review of bone windows is unremarkable. 1. Limited study due to motion artifact and the lack of contrast. Fecal impaction at the rectal level with mild thickening of the wall the rectum. No evidence of bowel obstruction. No free fluid or free air. 2. No evidence of appendicitis. 3. Increased attenuation of body wall fat planes compatible with generalized volume overload and mild anasarca. Moderate renal atrophy as before. 4. Evidence of previous cholecystectomy. A preliminary report [email or web address]. There is no significant discrepancy with the preliminary report. Signed by Dr Rosalba Looney. Sanchez on 4/20/2019 7:50 AM    Xr Chest Standard (2 Vw)    Result Date: 3/27/2019  History: 47year-old with dyspnea. Reference: Chest radiograph March 24, 2019. Findings: Frontal and lateral chest radiographs performed. Heart mildly enlarged. From 3 days ago there is new interstitial edema predominantly at the bases. No significant pleural effusion.  No Visualized paranasal sinuses are unremarkable. There is poor aeration of the mastoid air cells, right worse than left. DLP: No acute intracranial abnormality. The above study was initially reviewed and reported by stat [facility]. I do not find any discrepancies. Signed by Dr Kendrick Clayton on 4/1/2019 7:11 AM    Xr Chest Portable    Result Date: 4/20/2019  EXAMINATION: XR CHEST PORTABLE 4/20/2019 7:18 AM HISTORY: Shortness of breath. Report: Comparison is made with the chest x-ray on 3/27/2019. One-view chest: Frontal view of the chest was obtained. The lungs are clear and normally expanded. The cardiac and mediastinal silhouettes are within normal limits. The visualized bony thorax and upper abdomen are unremarkable. A vascular stent is noted in the left subclavian region as before. No active disease. Signed by Dr Poncho Estrada. Sanchez on 4/20/2019 7:19 AM    Xr Chest Portable    Result Date: 3/24/2019  History: 47year-old with shortness of breath. Reference: Chest radiograph January 2019. Findings: Frontal chest radiograph performed. Mild cardiomegaly. The lungs are clear. I suspect tiny left pleural effusion. There is a left subclavian vascular stent. No acute osseous findings. Suspect tiny left pleural effusion. No overt edema/overload.  Signed by Dr Luann Carvajal on 3/24/2019 7:51 PM    Vl Dup Carotid Bilateral    Result Date: 4/2/2019  Vascular Carotid Procedure  Demographics   Patient Name     Abi OLIVEIRA Age                    47   Patient Number   [id number]         Gender                 Female   Visit Number     [id number]      Interpreting Physician Sabine Camarena MD   Date of Birth    1970     Referring Physician    Dania Robert   Accession Number [id number]      [id number], [place]  Procedure Type of Study:   Cerebral:Carotid, VL CAROTID BILATERAL. Indications for Study:Stroke like symptoms and Bruit. Risk Factors   - The patient's risk factor(s) include: diabetes mellitus and arterial     hypertension.   - The patient has a current/recent (within 1 year) tobacco history. Impression   There is smooth plaque visualized in the right internal carotid  artery(ies). There is no stenosis in the right internal carotid artery. There is heterogeneous plaque visualized in the left internal carotid  artery(ies). There is less than 50% stenosis of the left internal carotid artery. There is normal antegrade flow in the bilateral vertebral artery(ies). Signature   ----------------------------------------------------------------  Electronically signed by Lianna Roger MD(Interpreting  physician) on 04/02/2019 07:06 AM  ----------------------------------------------------------------  Blood Pressure:Right arm 111/60 mmHg. Velocities are measured in cm/s ; Diameters are measured in mm Carotid Right Measurements +------------+-------+-------+--------+-------+------------+---------------+ ! Location    ! PSV    ! EDV    ! Angle   ! RI     !%Stenosis   ! Tortuosity     ! +------------+-------+-------+--------+-------+------------+---------------+ ! Prox CCA    !102    !8.21   ! 60      !0.92   !            !               ! +------------+-------+-------+--------+-------+------------+---------------+ ! Mid CCA     !93.8   !11.1   ! 60      !0.88   !            !               ! +------------+-------+-------+--------+-------+------------+---------------+ ! Prox ICA    !51.9   !11.8   !60      !0.77   !            !               ! +------------+-------+-------+--------+-------+------------+---------------+ ! Mid ICA     !71.1   !19.3   !60      !0.73   !            !               ! +------------+-------+-------+--------+-------+------------+---------------+ ! Dist ICA    ! 46.7   !14.2   !0       !0.7    !             !               ! +------------+-------+-------+--------+-------+------------+---------------+ ! Prox ECA    !010    !       !61      !       !            !               ! +------------+-------+-------+--------+-------+------------+---------------+ ! Vertebral   !68.6   !21.1   ! 60      !0.69   !            !               ! +------------+-------+-------+--------+-------+------------+---------------+   - There is antegrade vertebral flow noted on the right side. - Additional Measurements:ICAPSV/CCAPSV 0.7. ICAEDV/CCAEDV 2.35. Carotid Left Measurements +------------+-------+-------+--------+-------+------------+---------------+ ! Location    ! PSV    ! EDV    ! Angle   ! RI     !%Stenosis   ! Tortuosity     ! +------------+-------+-------+--------+-------+------------+---------------+ ! Prox CCA    !84.4   !13.5   !60      !0.84   !            !               ! +------------+-------+-------+--------+-------+------------+---------------+ ! Mid CCA     !107    !15.8   !60      !0.85   !            !               ! +------------+-------+-------+--------+-------+------------+---------------+ ! Prox ICA    ! 84.4   !22.3   !60      !0.74   !            !               ! +------------+-------+-------+--------+-------+------------+---------------+ ! Mid ICA     !75.6   !21.7   !60      !0.71   !            !               ! +------------+-------+-------+--------+-------+------------+---------------+ ! Dist ICA    !82.1   !21.7   !60      !0.74   !            !               ! +------------+-------+-------+--------+-------+------------+---------------+ ! Prox ECA    !358    !       !61      !       !            !               ! +------------+-------+-------+--------+-------+------------+---------------+ ! Vertebral   !45.9   !6.43   !60      !0.86   !            !               ! +------------+-------+-------+--------+-------+------------+---------------+   - There is antegrade vertebral flow noted on the left side.    - Additional Measurements:ICAPSV/CCAPSV 1. ICAEDV/CCAEDV 1.65. Cta Pulmonary W Contrast    Result Date: 3/27/2019  CTA PULMONARY W CONTRAST 3/27/2019 10:45 AM HISTORY: Dyspnea, clinical concerns for pulmonary embolus Comparison: 2/11/2019 chest CT Technique: CT evaluation of the chest was performed with intravenous contrast. 2 mm transaxial images were obtained. Maximum intensity projection reconstruction angiographic images were generated . Two-dimensional Coronal and sagittal reconstruction images of the pulmonary arteries, aorta and great vessels were also generated. .Radiation dose equals  mGy cm. AUTOMATED EXPOSURE CONTROL dose reduction technique was implemented Findings: There are small to moderate-sized bilateral pleural effusions, layering posteriorly. There is reticular nodular interstitial changes diffusely in the lungs with peribronchial cuffing. There are patchy groundglass opacities in the upper lobes with more confluent groundglass opacities in the lower lobes. There is cardiomegaly with panchamber enlargement. There is diffuse body wall edema with anasarca change. Differential considerations include pulmonary edema/congestive heart failure-volume overload. The mediastinum appears edematous with diffuse low-attenuation changes without definite lymphadenopathy. Peribronchial thickening noted in the hilar regions without significant lymph node enlargement. Calcified left hilar lymph nodes appreciated. There is mild reflux of contrast material into the inferior vena cava and hepatic veins suggesting elevated right heart pressures. The pulmonary arteries opacify without iodinated contrast without intraluminal filling defects or CT angiographic evidence of pulmonary embolus. Aorta is normal in caliber, without aneurysm or dissection. Left-sided subclavian venous stent is observed. Limited imaging of the upper abdomen is unremarkable. No acute osseous abnormalities identified. Impression: 1.  No CT angiographic evidence of pulmonary embolus or acute aortic pathology. 2. Small to moderate-sized pleural effusions with reticular interstitial/peribronchial thickening with diffuse patchy groundglass opacities. Cardiomegaly with subcutaneous edema/anasarca change. This finding suggest pulmonary edema, congestive heart failure/volume overload, correlate with patient presentation. Signed by Dr Ravi Nelson on 3/27/2019 1:27 PM    Mri Brain Wo Contrast    Result Date: 4/1/2019  MRI BRAIN WO CONTRAST 4/1/2019 12:15 PM HISTORY: Stroke symptoms. Left eye visual changes and generalized weakness. Comparison: None. Technique: Multiplanar imaging of the brain was performed in a routine fashion. Findings: Midline structures are nondisplaced. Ventricular system is normal. There is no significant mass effect or hydrocephalus. Basilar cisterns are preserved. Mild small vessel ischemic changes are present with increased T2 signal involving the periventricular and higher white matter tracts, particularly in the region of the atrial trigone. . No abnormal extra axial fluid collections are noted. No restriction of diffusion is present. Proximal cervical spinal cord, brainstem, and cerebellum are unremarkable. Normal cerebrovascular flow voids are seen. Bilateral globes and orbits are normal in appearance. No abnormal signal is noted in the mastoid air cells or paranasal sinuses. Impression: 1. There is no restricted diffusion to suggest acute ischemia or infarct. There is no mass effect or shift of the midline. 2. Mild small vessel ischemic change.  Signed by Dr Dayna Raza on 4/1/2019 5:40 PM       Assessment   ESRD  DM2 with nephropathy on insulin  HTN  Hyponatremia  Secondary Hyperparathyroidism  Anemia CKD      Plan:  HD TTS  Fluid counseling   Monitor bp trends, may be able to dc if trends adequate post HD    Gwen Wilcox MD  04/23/19  11:03 AM

## 2019-06-03 ENCOUNTER — HOSPITAL ENCOUNTER (EMERGENCY)
Age: 49
Discharge: HOME | End: 2019-06-03
Payer: MEDICARE

## 2019-06-03 ENCOUNTER — APPOINTMENT (OUTPATIENT)
Dept: GENERAL RADIOLOGY | Age: 49
End: 2019-06-03
Payer: MEDICARE

## 2019-06-03 VITALS
TEMPERATURE: 97.4 F | HEART RATE: 67 BPM | RESPIRATION RATE: 20 BRPM | DIASTOLIC BLOOD PRESSURE: 77 MMHG | WEIGHT: 150 LBS | HEIGHT: 66 IN | SYSTOLIC BLOOD PRESSURE: 161 MMHG | BODY MASS INDEX: 24.11 KG/M2 | OXYGEN SATURATION: 98 %

## 2019-06-03 DIAGNOSIS — S42.201A CLOSED FRACTURE OF PROXIMAL END OF RIGHT HUMERUS, UNSPECIFIED FRACTURE MORPHOLOGY, INITIAL ENCOUNTER: Primary | ICD-10-CM

## 2019-06-03 PROCEDURE — 73090 X-RAY EXAM OF FOREARM: CPT

## 2019-06-03 PROCEDURE — 71101 X-RAY EXAM UNILAT RIBS/CHEST: CPT

## 2019-06-03 PROCEDURE — 90471 IMMUNIZATION ADMIN: CPT | Performed by: NURSE PRACTITIONER

## 2019-06-03 PROCEDURE — 6360000002 HC RX W HCPCS: Performed by: NURSE PRACTITIONER

## 2019-06-03 PROCEDURE — 73030 X-RAY EXAM OF SHOULDER: CPT

## 2019-06-03 PROCEDURE — 99284 EMERGENCY DEPT VISIT MOD MDM: CPT

## 2019-06-03 PROCEDURE — 99284 EMERGENCY DEPT VISIT MOD MDM: CPT | Performed by: NURSE PRACTITIONER

## 2019-06-03 PROCEDURE — 96372 THER/PROPH/DIAG INJ SC/IM: CPT

## 2019-06-03 PROCEDURE — 90715 TDAP VACCINE 7 YRS/> IM: CPT | Performed by: NURSE PRACTITIONER

## 2019-06-03 PROCEDURE — 73110 X-RAY EXAM OF WRIST: CPT

## 2019-06-03 PROCEDURE — 73060 X-RAY EXAM OF HUMERUS: CPT

## 2019-06-03 RX ORDER — HYDROCODONE BITARTRATE AND ACETAMINOPHEN 7.5; 325 MG/1; MG/1
1 TABLET ORAL EVERY 6 HOURS PRN
Qty: 12 TABLET | Refills: 0 | Status: SHIPPED | OUTPATIENT
Start: 2019-06-03 | End: 2019-06-06

## 2019-06-03 RX ORDER — MORPHINE SULFATE 10 MG/ML
4 INJECTION, SOLUTION INTRAMUSCULAR; INTRAVENOUS ONCE
Status: COMPLETED | OUTPATIENT
Start: 2019-06-03 | End: 2019-06-03

## 2019-06-03 RX ORDER — ONDANSETRON 2 MG/ML
4 INJECTION INTRAMUSCULAR; INTRAVENOUS ONCE
Status: COMPLETED | OUTPATIENT
Start: 2019-06-03 | End: 2019-06-03

## 2019-06-03 RX ADMIN — MORPHINE SULFATE 4 MG: 10 INJECTION INTRAVENOUS at 18:48

## 2019-06-03 RX ADMIN — TETANUS TOXOID, REDUCED DIPHTHERIA TOXOID AND ACELLULAR PERTUSSIS VACCINE, ADSORBED 0.5 ML: 5; 2.5; 8; 8; 2.5 SUSPENSION INTRAMUSCULAR at 20:54

## 2019-06-03 RX ADMIN — ONDANSETRON 4 MG: 2 INJECTION INTRAMUSCULAR; INTRAVENOUS at 18:48

## 2019-06-03 ASSESSMENT — PAIN SCALES - GENERAL
PAINLEVEL_OUTOF10: 10
PAINLEVEL_OUTOF10: 10

## 2019-06-03 ASSESSMENT — ENCOUNTER SYMPTOMS
VOMITING: 0
TROUBLE SWALLOWING: 0
CONSTIPATION: 0
SINUS PAIN: 0
BACK PAIN: 0
EYE PAIN: 0
ABDOMINAL PAIN: 0
PHOTOPHOBIA: 0
SORE THROAT: 0
ABDOMINAL DISTENTION: 0
COLOR CHANGE: 0
DIARRHEA: 0
WHEEZING: 0
EYE REDNESS: 0
SHORTNESS OF BREATH: 0
STRIDOR: 0
BLOOD IN STOOL: 0
CHEST TIGHTNESS: 0
EYE DISCHARGE: 0
EYE ITCHING: 0
NAUSEA: 0
ALLERGIC/IMMUNOLOGIC NEGATIVE: 1

## 2019-06-03 NOTE — ED NOTES
Bed: RME02  Expected date:   Expected time:   Means of arrival:   Comments:  [address]  06/03/19 1831

## 2019-06-04 NOTE — ED NOTES
Dr Rubio Damico at bedside speaking with pt     Ana Teran, JAZZ  03/24/19 6286 Yes go ahead and I will sign it.

## 2019-08-15 ENCOUNTER — TELEPHONE (OUTPATIENT)
Dept: VASCULAR SURGERY | Age: 49
End: 2019-08-15

## 2019-08-27 ENCOUNTER — PREP FOR PROCEDURE (OUTPATIENT)
Dept: VASCULAR SURGERY | Age: 49
End: 2019-08-27

## 2019-08-27 RX ORDER — SODIUM CHLORIDE 0.9 % (FLUSH) 0.9 %
10 SYRINGE (ML) INJECTION PRN
Status: CANCELLED | OUTPATIENT
Start: 2019-08-27

## 2019-08-27 RX ORDER — SODIUM CHLORIDE 9 MG/ML
INJECTION, SOLUTION INTRAVENOUS CONTINUOUS
Status: CANCELLED | OUTPATIENT
Start: 2019-08-27

## 2019-08-27 RX ORDER — ASPIRIN 81 MG/1
81 TABLET ORAL ONCE
Status: CANCELLED | OUTPATIENT
Start: 2019-08-27 | End: 2019-08-27

## 2019-08-27 RX ORDER — CLONIDINE HYDROCHLORIDE 0.1 MG/1
0.1 TABLET ORAL PRN
Status: CANCELLED | OUTPATIENT
Start: 2019-08-27

## 2019-08-27 NOTE — H&P
Breast Cancer Sister     Depression Daughter 25        committed suicide    Liver Cancer Neg Hx     Liver Disease Neg Hx     Esophageal Cancer Neg Hx     Rectal Cancer Neg Hx       Social History     Tobacco Use    Smoking status: Current Every Day Smoker     Packs/day: 0.50     Years: 25.00     Pack years: 12.50     Types: Cigarettes     Last attempt to quit: 2017     Years since quittin.2    Smokeless tobacco: Never Used   Substance Use Topics    Alcohol use: No     PE:  NAD  Left upper extremity av fistula pulsatile    A/P:    ESRD with pain and pulsatility left upper arm av fistula    Permit for fistulogram with possible angioplasty or stent    Risks have been reviewed with the patient including but not limited to heart attack, death, CVA, bleeding, nerve injury, infection, steal, pain, and need for further surgery.       _______________________________________________________________________  Signature     Date    Time

## 2019-08-27 NOTE — H&P (VIEW-ONLY)
Patient Care Team:  ROMINA Douglas as PCP - General (Family Nurse Practitioner)  ROMINA Lama as Advanced Practice Nurse (Nurse Practitioner)        Landry Steven 47 y.o. female with a history of renal failure requiring hemodialysis access. Patient is currently having pain at access site    Patient Active Problem List   Diagnosis    Chronic constipation    Gastroparesis    Gastroesophageal reflux disease without esophagitis    Acquired hypothyroidism    Hyperkalemia    Anemia in chronic kidney disease (CKD)    Iron deficiency    Family history of colon cancer    Unintentional weight loss    Type 2 diabetes mellitus with chronic kidney disease on chronic dialysis, with long-term current use of insulin (HCC)    Noncompliance of patient with renal dialysis ([address]    Respiratory failure ([address]    Acute respiratory failure with hypoxia and hypercapnia (HCC)    Hyponatremia    Dialysis AV fistula malfunction (HCC)    Gastroparesis    Nausea and vomiting    Chronic constipation    Uncontrolled type 2 diabetes mellitus with complication, with long-term current use of insulin (HCC)    Abnormal CT of liver    Other ascites    ESRD on dialysis ([address]    High hepatic iron concentration determined by biopsy of liver    Steal syndrome as complication of dialysis access ([address]    PVD (peripheral vascular disease) ([address]    ESRD on hemodialysis ([address]    Hyponatremia    Hyperglycemia due to type 2 diabetes mellitus (HCC)    Normocytic anemia    Volume overload    Left homonymous hemianopsia    Acute intractable headache    Accelerated hypertension    Medically noncompliant    Noncompliance with medications      See attached meds  Allergies:  Penicillins;  Lamisil advanced [terbinafine]; and Stadol [butorphanol]  Past Medical History:   Diagnosis Date    Arthritis     Chronic kidney disease, stage 5, kidney failure ([address]     DM (diabetes mellitus) type II controlled with renal

## 2019-08-28 ENCOUNTER — HOSPITAL ENCOUNTER (OUTPATIENT)
Dept: INTERVENTIONAL RADIOLOGY/VASCULAR | Age: 49
Discharge: HOME | End: 2019-08-28
Payer: MEDICARE

## 2019-08-28 VITALS
OXYGEN SATURATION: 95 % | SYSTOLIC BLOOD PRESSURE: 139 MMHG | WEIGHT: 140.43 LBS | RESPIRATION RATE: 14 BRPM | TEMPERATURE: 98.7 F | HEIGHT: 66 IN | BODY MASS INDEX: 22.57 KG/M2 | HEART RATE: 71 BPM | DIASTOLIC BLOOD PRESSURE: 66 MMHG

## 2019-08-28 DIAGNOSIS — N18.6 ESRD ON DIALYSIS (HCC): ICD-10-CM

## 2019-08-28 DIAGNOSIS — Z99.2 ESRD ON DIALYSIS (HCC): ICD-10-CM

## 2019-08-28 PROCEDURE — 36903 INTRO CATH DIALYSIS CIRCUIT: CPT | Performed by: SURGERY

## 2019-08-28 PROCEDURE — 6360000002 HC RX W HCPCS: Performed by: SURGERY

## 2019-08-28 PROCEDURE — 99152 MOD SED SAME PHYS/QHP 5/>YRS: CPT | Performed by: SURGERY

## 2019-08-28 PROCEDURE — 36907 BALO ANGIOP CTR DIALYSIS SEG: CPT | Performed by: SURGERY

## 2019-08-28 PROCEDURE — 99153 MOD SED SAME PHYS/QHP EA: CPT | Performed by: SURGERY

## 2019-08-28 PROCEDURE — C1769 GUIDE WIRE: HCPCS

## 2019-08-28 PROCEDURE — 2580000003 HC RX 258: Performed by: NURSE PRACTITIONER

## 2019-08-28 PROCEDURE — 6370000000 HC RX 637 (ALT 250 FOR IP): Performed by: NURSE PRACTITIONER

## 2019-08-28 PROCEDURE — 6360000004 HC RX CONTRAST MEDICATION: Performed by: SURGERY

## 2019-08-28 RX ORDER — MIDAZOLAM HYDROCHLORIDE 1 MG/ML
INJECTION INTRAMUSCULAR; INTRAVENOUS
Status: COMPLETED | OUTPATIENT
Start: 2019-08-28 | End: 2019-08-28

## 2019-08-28 RX ORDER — HYDROCODONE BITARTRATE AND ACETAMINOPHEN 5; 325 MG/1; MG/1
2 TABLET ORAL EVERY 4 HOURS PRN
Status: DISCONTINUED | OUTPATIENT
Start: 2019-08-28 | End: 2019-08-30 | Stop reason: HOSPADM

## 2019-08-28 RX ORDER — HYDROCODONE BITARTRATE AND ACETAMINOPHEN 5; 325 MG/1; MG/1
1 TABLET ORAL EVERY 4 HOURS PRN
Status: DISCONTINUED | OUTPATIENT
Start: 2019-08-28 | End: 2019-08-30 | Stop reason: HOSPADM

## 2019-08-28 RX ORDER — ACETAMINOPHEN 325 MG/1
650 TABLET ORAL EVERY 4 HOURS PRN
Status: DISCONTINUED | OUTPATIENT
Start: 2019-08-28 | End: 2019-08-30 | Stop reason: HOSPADM

## 2019-08-28 RX ORDER — ONDANSETRON 2 MG/ML
4 INJECTION INTRAMUSCULAR; INTRAVENOUS EVERY 8 HOURS PRN
Status: DISCONTINUED | OUTPATIENT
Start: 2019-08-28 | End: 2019-08-30 | Stop reason: HOSPADM

## 2019-08-28 RX ORDER — FENTANYL CITRATE 50 UG/ML
INJECTION, SOLUTION INTRAMUSCULAR; INTRAVENOUS
Status: COMPLETED | OUTPATIENT
Start: 2019-08-28 | End: 2019-08-28

## 2019-08-28 RX ORDER — SODIUM CHLORIDE 0.9 % (FLUSH) 0.9 %
10 SYRINGE (ML) INJECTION PRN
Status: DISCONTINUED | OUTPATIENT
Start: 2019-08-28 | End: 2019-08-30 | Stop reason: HOSPADM

## 2019-08-28 RX ORDER — ASPIRIN 81 MG/1
81 TABLET ORAL ONCE
Status: COMPLETED | OUTPATIENT
Start: 2019-08-28 | End: 2019-08-28

## 2019-08-28 RX ORDER — HEPARIN SODIUM 5000 [USP'U]/ML
INJECTION, SOLUTION INTRAVENOUS; SUBCUTANEOUS
Status: COMPLETED | OUTPATIENT
Start: 2019-08-28 | End: 2019-08-28

## 2019-08-28 RX ORDER — CLONIDINE HYDROCHLORIDE 0.1 MG/1
0.1 TABLET ORAL PRN
Status: DISCONTINUED | OUTPATIENT
Start: 2019-08-28 | End: 2019-08-30 | Stop reason: HOSPADM

## 2019-08-28 RX ORDER — SODIUM CHLORIDE 9 MG/ML
INJECTION, SOLUTION INTRAVENOUS CONTINUOUS
Status: DISCONTINUED | OUTPATIENT
Start: 2019-08-28 | End: 2019-08-30 | Stop reason: HOSPADM

## 2019-08-28 RX ORDER — SEVELAMER CARBONATE 800 MG/1
1 TABLET, FILM COATED ORAL
COMMUNITY

## 2019-08-28 RX ADMIN — SODIUM CHLORIDE: 9 INJECTION, SOLUTION INTRAVENOUS at 07:24

## 2019-08-28 RX ADMIN — IOPAMIDOL 40 ML: 612 INJECTION, SOLUTION INTRATHECAL at 08:33

## 2019-08-28 RX ADMIN — ASPIRIN 81 MG: 81 TABLET, COATED ORAL at 07:25

## 2019-08-28 RX ADMIN — FENTANYL CITRATE 25 MCG: 50 INJECTION INTRAMUSCULAR; INTRAVENOUS at 08:13

## 2019-08-28 RX ADMIN — HEPARIN SODIUM 5000 UNITS: 5000 INJECTION, SOLUTION INTRAVENOUS; SUBCUTANEOUS at 08:10

## 2019-08-28 RX ADMIN — MIDAZOLAM 1 MG: 1 INJECTION INTRAMUSCULAR; INTRAVENOUS at 08:13

## 2019-08-28 NOTE — OP NOTE
Preprocedure diagnosis:  1. End-stage renal disease on hemodialysis  2. Pain in the left upper arm over the arteriovenous fistula with increased pulsatility (left upper extremity arteriovenous fistula malfunction)    Post procedure diagnosis: Same    Procedure:  1. Left upper extremity fistulogram's including venography to the superior vena cava  2. Balloon angioplasty left subclavian vein with 10 mm x 40 mm conquest balloon  3. Balloon angioplasty mid/proximal upper arm cephalic vein with 10 mm x 40 mm conquest balloon  4. Venograms after balloon angioplasty  5. Stent left mid/proximal upper arm cephalic vein stenosis (fluency 10 mm x 60 mm self-expanding covered stent)  6. Balloon angioplasty stent with 10 mm x 40 mm conquest balloon  7. Completion venograms left upper extremity    Surgeon: Reginaldo Hull MD    Anesthesia intravenous/moderate sedation plus local    Estimated blood loss: Less than 50 Millers    Findings:  1. Patient has a patent left brachial artery to cephalic vein arteriovenous fistula. The arteriovenous anastomosis is widely patent. In the mid/proximal upper arm cephalic vein, there is a 70% stenosis. The cephalic vein proximal to and distal to this is around 10 to 12 mm in diameter. There is a stent in the left cephalic arch extending into the subclavian vein. The subclavian end of the stent has a fairly focal 70% stenosis. The innominate vein superior vena cava are both widely patent. Procedure in detail:    After the patient was consented and given intravenous antibiotics, she was brought to angiography and placed on the angiography suite table supine position. Intravenous/moderate sedation was administered in the patient's left arm was prepped and draped in usual sterile procedure.     I was personally responsible for the administration of moderate sedation services during the procedure performed and I confirm requirements described in CPT section on moderate sedation were

## 2019-10-08 ENCOUNTER — HOSPITAL ENCOUNTER (INPATIENT)
Age: 49
LOS: 5 days | Discharge: HOME | DRG: 637 | End: 2019-10-13
Attending: EMERGENCY MEDICINE | Admitting: INTERNAL MEDICINE
Payer: MEDICARE

## 2019-10-08 ENCOUNTER — APPOINTMENT (OUTPATIENT)
Dept: CT IMAGING | Age: 49
DRG: 637 | End: 2019-10-08
Payer: MEDICARE

## 2019-10-08 ENCOUNTER — APPOINTMENT (OUTPATIENT)
Dept: GENERAL RADIOLOGY | Age: 49
DRG: 637 | End: 2019-10-08
Payer: MEDICARE

## 2019-10-08 DIAGNOSIS — R56.9 SEIZURE (HCC): Primary | ICD-10-CM

## 2019-10-08 DIAGNOSIS — E11.01 HYPERGLYCEMIC HYPEROSMOLAR NONKETOTIC COMA (HCC): ICD-10-CM

## 2019-10-08 DIAGNOSIS — E87.20 LACTIC ACIDOSIS: ICD-10-CM

## 2019-10-08 DIAGNOSIS — I16.9 HYPERTENSIVE CRISIS: ICD-10-CM

## 2019-10-08 DIAGNOSIS — E03.9 HYPOTHYROIDISM, UNSPECIFIED TYPE: ICD-10-CM

## 2019-10-08 LAB
ACETAMINOPHEN LEVEL: <15 UG/ML
ALBUMIN SERPL-MCNC: 4.1 G/DL (ref 3.5–5.2)
ALP BLD-CCNC: 176 U/L (ref 35–104)
ALT SERPL-CCNC: 31 U/L (ref 5–33)
AMMONIA: 14 UMOL/L (ref 11–51)
AMMONIA: 26 UMOL/L (ref 11–51)
AMPHETAMINE SCREEN, URINE: NEGATIVE
ANION GAP SERPL CALCULATED.3IONS-SCNC: 19 MMOL/L (ref 7–19)
APTT: 24.2 SEC (ref 26–36.2)
AST SERPL-CCNC: 22 U/L (ref 5–32)
BACTERIA: ABNORMAL /HPF
BARBITURATE SCREEN URINE: NEGATIVE
BASE EXCESS ARTERIAL: -0.9 MMOL/L (ref -2–2)
BASE EXCESS ARTERIAL: -2.1 MMOL/L (ref -2–2)
BASOPHILS ABSOLUTE: 0.1 K/UL (ref 0–0.2)
BASOPHILS RELATIVE PERCENT: 1.6 % (ref 0–1)
BENZODIAZEPINE SCREEN, URINE: NEGATIVE
BILIRUB SERPL-MCNC: 0.4 MG/DL (ref 0.2–1.2)
BILIRUBIN URINE: NEGATIVE
BLOOD, URINE: ABNORMAL
BUN BLDV-MCNC: 40 MG/DL (ref 6–20)
CALCIUM SERPL-MCNC: 8.5 MG/DL (ref 8.6–10)
CANNABINOID SCREEN URINE: POSITIVE
CARBOXYHEMOGLOBIN ARTERIAL: 3 % (ref 0–5)
CARBOXYHEMOGLOBIN ARTERIAL: 6.4 % (ref 0–5)
CHLORIDE BLD-SCNC: 82 MMOL/L (ref 98–111)
CLARITY: CLEAR
CO2: 24 MMOL/L (ref 22–29)
COCAINE METABOLITE SCREEN URINE: NEGATIVE
COLOR: YELLOW
CORTISOL TOTAL: 30.1 UG/DL
CREAT SERPL-MCNC: 6.5 MG/DL (ref 0.5–0.9)
EOSINOPHILS ABSOLUTE: 0.2 K/UL (ref 0–0.6)
EOSINOPHILS RELATIVE PERCENT: 3 % (ref 0–5)
EPITHELIAL CELLS, UA: ABNORMAL /HPF
ETHANOL: <10 MG/DL (ref 0–0.08)
GFR NON-AFRICAN AMERICAN: 7
GLUCOSE BLD-MCNC: 116 MG/DL (ref 70–99)
GLUCOSE BLD-MCNC: 155 MG/DL (ref 70–99)
GLUCOSE BLD-MCNC: 217 MG/DL (ref 70–99)
GLUCOSE BLD-MCNC: 319 MG/DL (ref 70–99)
GLUCOSE BLD-MCNC: 457 MG/DL (ref 70–99)
GLUCOSE BLD-MCNC: 758 MG/DL (ref 74–109)
GLUCOSE BLD-MCNC: 78 MG/DL (ref 70–99)
GLUCOSE BLD-MCNC: 80 MG/DL (ref 70–99)
GLUCOSE BLD-MCNC: 85 MG/DL (ref 70–99)
GLUCOSE BLD-MCNC: 93 MG/DL (ref 70–99)
GLUCOSE BLD-MCNC: >550 MG/DL (ref 70–99)
GLUCOSE URINE: >=1000 MG/DL
HBA1C MFR BLD: 13.7 % (ref 4–6)
HCO3 ARTERIAL: 24.6 MMOL/L (ref 22–26)
HCO3 ARTERIAL: 25.3 MMOL/L (ref 22–26)
HCT VFR BLD CALC: 33.1 % (ref 37–47)
HEMOGLOBIN, ART, EXTENDED: 10.6 G/DL (ref 12–16)
HEMOGLOBIN, ART, EXTENDED: 10.8 G/DL (ref 12–16)
HEMOGLOBIN: 10.8 G/DL (ref 12–16)
IMMATURE GRANULOCYTES #: 0 K/UL
INR BLD: 1.1 (ref 0.88–1.18)
KETONES, URINE: NEGATIVE MG/DL
LACTIC ACID: 3.5 MMOL/L (ref 0.5–1.9)
LEUKOCYTE ESTERASE, URINE: NEGATIVE
LYMPHOCYTES ABSOLUTE: 1.4 K/UL (ref 1.1–4.5)
LYMPHOCYTES RELATIVE PERCENT: 21.7 % (ref 20–40)
Lab: ABNORMAL
MCH RBC QN AUTO: 31 PG (ref 27–31)
MCHC RBC AUTO-ENTMCNC: 32.6 G/DL (ref 33–37)
MCV RBC AUTO: 95.1 FL (ref 81–99)
METHEMOGLOBIN ARTERIAL: 0.5 %
METHEMOGLOBIN ARTERIAL: 0.8 %
MONOCYTES ABSOLUTE: 0.5 K/UL (ref 0–0.9)
MONOCYTES RELATIVE PERCENT: 7.5 % (ref 0–10)
NEUTROPHILS ABSOLUTE: 4.1 K/UL (ref 1.5–7.5)
NEUTROPHILS RELATIVE PERCENT: 65.9 % (ref 50–65)
NITRITE, URINE: NEGATIVE
O2 CONTENT ARTERIAL: 13.8 ML/DL
O2 CONTENT ARTERIAL: 14.7 ML/DL
O2 SAT, ARTERIAL: 92.3 %
O2 SAT, ARTERIAL: 95.1 %
O2 THERAPY: ABNORMAL
O2 THERAPY: ABNORMAL
OPIATE SCREEN URINE: NEGATIVE
PCO2 ARTERIAL: 48 MMHG (ref 35–45)
PCO2 ARTERIAL: 50 MMHG (ref 35–45)
PDW BLD-RTO: 13.4 % (ref 11.5–14.5)
PERFORMED ON: ABNORMAL
PERFORMED ON: NORMAL
PH ARTERIAL: 7.3 (ref 7.35–7.45)
PH ARTERIAL: 7.33 (ref 7.35–7.45)
PH UA: 6.5 (ref 5–8)
PLATELET # BLD: 134 K/UL (ref 130–400)
PMV BLD AUTO: 11.1 FL (ref 9.4–12.3)
PO2 ARTERIAL: 126 MMHG (ref 80–100)
PO2 ARTERIAL: 72 MMHG (ref 80–100)
POTASSIUM SERPL-SCNC: 4.7 MMOL/L (ref 3.5–5)
POTASSIUM, WHOLE BLOOD: 3.7
POTASSIUM, WHOLE BLOOD: 4.6
PROTEIN UA: 100 MG/DL
PROTHROMBIN TIME: 13.6 SEC (ref 12–14.6)
RBC # BLD: 3.48 M/UL (ref 4.2–5.4)
RBC UA: ABNORMAL /HPF (ref 0–2)
SALICYLATE, SERUM: <3 MG/DL (ref 3–10)
SODIUM BLD-SCNC: 125 MMOL/L (ref 136–145)
SPECIFIC GRAVITY UA: 1.02 (ref 1–1.03)
T3 FREE: 1.1 PG/ML (ref 2–4.4)
T4 FREE: 1.2 NG/DL (ref 0.9–1.7)
TOTAL PROTEIN: 6.9 G/DL (ref 6.6–8.7)
TROPONIN: 0.01 NG/ML (ref 0–0.03)
TSH SERPL DL<=0.05 MIU/L-ACNC: 17.11 UIU/ML (ref 0.27–4.2)
URINE REFLEX TO CULTURE: ABNORMAL
UROBILINOGEN, URINE: 0.2 E.U./DL
VITAMIN B-12: 442 PG/ML (ref 211–946)
WBC # BLD: 6.3 K/UL (ref 4.8–10.8)
WBC UA: ABNORMAL /HPF (ref 0–5)

## 2019-10-08 PROCEDURE — 70450 CT HEAD/BRAIN W/O DYE: CPT

## 2019-10-08 PROCEDURE — 99223 1ST HOSP IP/OBS HIGH 75: CPT | Performed by: PSYCHIATRY & NEUROLOGY

## 2019-10-08 PROCEDURE — 2500000003 HC RX 250 WO HCPCS: Performed by: EMERGENCY MEDICINE

## 2019-10-08 PROCEDURE — 71045 X-RAY EXAM CHEST 1 VIEW: CPT

## 2019-10-08 PROCEDURE — 95819 EEG AWAKE AND ASLEEP: CPT | Performed by: PSYCHIATRY & NEUROLOGY

## 2019-10-08 PROCEDURE — 80307 DRUG TEST PRSMV CHEM ANLYZR: CPT

## 2019-10-08 PROCEDURE — 6370000000 HC RX 637 (ALT 250 FOR IP): Performed by: HOSPITALIST

## 2019-10-08 PROCEDURE — G0480 DRUG TEST DEF 1-7 CLASSES: HCPCS

## 2019-10-08 PROCEDURE — 83605 ASSAY OF LACTIC ACID: CPT

## 2019-10-08 PROCEDURE — 6370000000 HC RX 637 (ALT 250 FOR IP): Performed by: PSYCHIATRY & NEUROLOGY

## 2019-10-08 PROCEDURE — 8010000000 HC HEMODIALYSIS ACUTE INPT

## 2019-10-08 PROCEDURE — 81001 URINALYSIS AUTO W/SCOPE: CPT

## 2019-10-08 PROCEDURE — 85025 COMPLETE CBC W/AUTO DIFF WBC: CPT

## 2019-10-08 PROCEDURE — 6360000002 HC RX W HCPCS: Performed by: EMERGENCY MEDICINE

## 2019-10-08 PROCEDURE — 6370000000 HC RX 637 (ALT 250 FOR IP): Performed by: EMERGENCY MEDICINE

## 2019-10-08 PROCEDURE — 85610 PROTHROMBIN TIME: CPT

## 2019-10-08 PROCEDURE — 82948 REAGENT STRIP/BLOOD GLUCOSE: CPT

## 2019-10-08 PROCEDURE — 80053 COMPREHEN METABOLIC PANEL: CPT

## 2019-10-08 PROCEDURE — 92610 EVALUATE SWALLOWING FUNCTION: CPT

## 2019-10-08 PROCEDURE — 2580000003 HC RX 258: Performed by: EMERGENCY MEDICINE

## 2019-10-08 PROCEDURE — 95816 EEG AWAKE AND DROWSY: CPT

## 2019-10-08 PROCEDURE — 83036 HEMOGLOBIN GLYCOSYLATED A1C: CPT

## 2019-10-08 PROCEDURE — 82533 TOTAL CORTISOL: CPT

## 2019-10-08 PROCEDURE — 99291 CRITICAL CARE FIRST HOUR: CPT

## 2019-10-08 PROCEDURE — 84132 ASSAY OF SERUM POTASSIUM: CPT

## 2019-10-08 PROCEDURE — 96375 TX/PRO/DX INJ NEW DRUG ADDON: CPT

## 2019-10-08 PROCEDURE — 2580000003 HC RX 258: Performed by: HOSPITALIST

## 2019-10-08 PROCEDURE — 99221 1ST HOSP IP/OBS SF/LOW 40: CPT | Performed by: NURSE PRACTITIONER

## 2019-10-08 PROCEDURE — 82607 VITAMIN B-12: CPT

## 2019-10-08 PROCEDURE — 5A1D70Z PERFORMANCE OF URINARY FILTRATION, INTERMITTENT, LESS THAN 6 HOURS PER DAY: ICD-10-PCS | Performed by: INTERNAL MEDICINE

## 2019-10-08 PROCEDURE — 87040 BLOOD CULTURE FOR BACTERIA: CPT

## 2019-10-08 PROCEDURE — 84443 ASSAY THYROID STIM HORMONE: CPT

## 2019-10-08 PROCEDURE — 82140 ASSAY OF AMMONIA: CPT

## 2019-10-08 PROCEDURE — 93005 ELECTROCARDIOGRAM TRACING: CPT | Performed by: EMERGENCY MEDICINE

## 2019-10-08 PROCEDURE — 6360000002 HC RX W HCPCS: Performed by: HOSPITALIST

## 2019-10-08 PROCEDURE — 82803 BLOOD GASES ANY COMBINATION: CPT

## 2019-10-08 PROCEDURE — 84481 FREE ASSAY (FT-3): CPT

## 2019-10-08 PROCEDURE — 84484 ASSAY OF TROPONIN QUANT: CPT

## 2019-10-08 PROCEDURE — 96365 THER/PROPH/DIAG IV INF INIT: CPT

## 2019-10-08 PROCEDURE — 36600 WITHDRAWAL OF ARTERIAL BLOOD: CPT

## 2019-10-08 PROCEDURE — 36415 COLL VENOUS BLD VENIPUNCTURE: CPT

## 2019-10-08 PROCEDURE — 85730 THROMBOPLASTIN TIME PARTIAL: CPT

## 2019-10-08 PROCEDURE — 2000000000 HC ICU R&B

## 2019-10-08 PROCEDURE — 2500000003 HC RX 250 WO HCPCS: Performed by: HOSPITALIST

## 2019-10-08 PROCEDURE — 84439 ASSAY OF FREE THYROXINE: CPT

## 2019-10-08 RX ORDER — DEXTROSE MONOHYDRATE 25 G/50ML
12.5 INJECTION, SOLUTION INTRAVENOUS PRN
Status: DISCONTINUED | OUTPATIENT
Start: 2019-10-08 | End: 2019-10-13 | Stop reason: HOSPADM

## 2019-10-08 RX ORDER — LEVETIRACETAM 5 MG/ML
500 INJECTION INTRAVASCULAR EVERY 12 HOURS
Status: DISCONTINUED | OUTPATIENT
Start: 2019-10-08 | End: 2019-10-09

## 2019-10-08 RX ORDER — LEVOTHYROXINE SODIUM 0.07 MG/1
150 TABLET ORAL DAILY
Status: DISCONTINUED | OUTPATIENT
Start: 2019-10-08 | End: 2019-10-13 | Stop reason: HOSPADM

## 2019-10-08 RX ORDER — ROPINIROLE 1 MG/1
1 TABLET, FILM COATED ORAL ONCE
Status: COMPLETED | OUTPATIENT
Start: 2019-10-08 | End: 2019-10-08

## 2019-10-08 RX ORDER — 0.9 % SODIUM CHLORIDE 0.9 %
1000 INTRAVENOUS SOLUTION INTRAVENOUS ONCE
Status: COMPLETED | OUTPATIENT
Start: 2019-10-08 | End: 2019-10-08

## 2019-10-08 RX ORDER — ONDANSETRON 2 MG/ML
4 INJECTION INTRAMUSCULAR; INTRAVENOUS EVERY 6 HOURS PRN
Status: DISCONTINUED | OUTPATIENT
Start: 2019-10-08 | End: 2019-10-13 | Stop reason: HOSPADM

## 2019-10-08 RX ORDER — SODIUM CHLORIDE 0.9 % (FLUSH) 0.9 %
10 SYRINGE (ML) INJECTION PRN
Status: DISCONTINUED | OUTPATIENT
Start: 2019-10-08 | End: 2019-10-13 | Stop reason: HOSPADM

## 2019-10-08 RX ORDER — LEVETIRACETAM 5 MG/ML
500 INJECTION INTRAVASCULAR EVERY 12 HOURS
Status: DISCONTINUED | OUTPATIENT
Start: 2019-10-08 | End: 2019-10-08

## 2019-10-08 RX ORDER — DULOXETIN HYDROCHLORIDE 60 MG/1
60 CAPSULE, DELAYED RELEASE ORAL DAILY
Status: DISCONTINUED | OUTPATIENT
Start: 2019-10-08 | End: 2019-10-13 | Stop reason: HOSPADM

## 2019-10-08 RX ORDER — METOPROLOL SUCCINATE 50 MG/1
100 TABLET, EXTENDED RELEASE ORAL
Status: DISCONTINUED | OUTPATIENT
Start: 2019-10-08 | End: 2019-10-13 | Stop reason: HOSPADM

## 2019-10-08 RX ORDER — LEVOTHYROXINE SODIUM ANHYDROUS 100 UG/5ML
400 INJECTION, POWDER, LYOPHILIZED, FOR SOLUTION INTRAVENOUS ONCE
Status: DISCONTINUED | OUTPATIENT
Start: 2019-10-08 | End: 2019-10-08

## 2019-10-08 RX ORDER — LEVETIRACETAM 5 MG/ML
500 INJECTION INTRAVASCULAR ONCE
Status: COMPLETED | OUTPATIENT
Start: 2019-10-08 | End: 2019-10-08

## 2019-10-08 RX ORDER — PROMETHAZINE HYDROCHLORIDE 12.5 MG/1
12.5 TABLET ORAL ONCE
Status: COMPLETED | OUTPATIENT
Start: 2019-10-08 | End: 2019-10-08

## 2019-10-08 RX ORDER — LORAZEPAM 2 MG/ML
2 INJECTION INTRAMUSCULAR EVERY 4 HOURS PRN
Status: DISCONTINUED | OUTPATIENT
Start: 2019-10-08 | End: 2019-10-13 | Stop reason: HOSPADM

## 2019-10-08 RX ORDER — SODIUM CHLORIDE 0.9 % (FLUSH) 0.9 %
10 SYRINGE (ML) INJECTION EVERY 12 HOURS SCHEDULED
Status: DISCONTINUED | OUTPATIENT
Start: 2019-10-08 | End: 2019-10-13 | Stop reason: HOSPADM

## 2019-10-08 RX ORDER — NIFEDIPINE 60 MG/1
60 TABLET, EXTENDED RELEASE ORAL DAILY
Status: DISCONTINUED | OUTPATIENT
Start: 2019-10-08 | End: 2019-10-09

## 2019-10-08 RX ORDER — SENNA PLUS 8.6 MG/1
1 TABLET ORAL DAILY PRN
Status: DISCONTINUED | OUTPATIENT
Start: 2019-10-08 | End: 2019-10-13 | Stop reason: HOSPADM

## 2019-10-08 RX ORDER — LIOTHYRONINE SODIUM 10 UG/ML
20 INJECTION, SOLUTION INTRAVENOUS ONCE
Status: COMPLETED | OUTPATIENT
Start: 2019-10-08 | End: 2019-10-08

## 2019-10-08 RX ORDER — ACETAMINOPHEN 325 MG/1
650 TABLET ORAL EVERY 4 HOURS PRN
Status: DISCONTINUED | OUTPATIENT
Start: 2019-10-08 | End: 2019-10-13 | Stop reason: HOSPADM

## 2019-10-08 RX ORDER — DEXTROSE MONOHYDRATE 50 MG/ML
100 INJECTION, SOLUTION INTRAVENOUS PRN
Status: DISCONTINUED | OUTPATIENT
Start: 2019-10-08 | End: 2019-10-10

## 2019-10-08 RX ORDER — SODIUM CHLORIDE 9 MG/ML
INJECTION, SOLUTION INTRAVENOUS CONTINUOUS
Status: DISCONTINUED | OUTPATIENT
Start: 2019-10-08 | End: 2019-10-10

## 2019-10-08 RX ORDER — LORAZEPAM 2 MG/ML
INJECTION INTRAMUSCULAR
Status: DISPENSED
Start: 2019-10-08 | End: 2019-10-08

## 2019-10-08 RX ORDER — SIMVASTATIN 40 MG
40 TABLET ORAL NIGHTLY
Status: DISCONTINUED | OUTPATIENT
Start: 2019-10-08 | End: 2019-10-13 | Stop reason: HOSPADM

## 2019-10-08 RX ORDER — SEVELAMER CARBONATE 800 MG/1
800 TABLET, FILM COATED ORAL
Status: DISCONTINUED | OUTPATIENT
Start: 2019-10-08 | End: 2019-10-13 | Stop reason: HOSPADM

## 2019-10-08 RX ORDER — LEVOTHYROXINE SODIUM ANHYDROUS 100 UG/5ML
160 INJECTION, POWDER, LYOPHILIZED, FOR SOLUTION INTRAVENOUS ONCE
Status: COMPLETED | OUTPATIENT
Start: 2019-10-08 | End: 2019-10-08

## 2019-10-08 RX ORDER — NICOTINE POLACRILEX 4 MG
15 LOZENGE BUCCAL PRN
Status: DISCONTINUED | OUTPATIENT
Start: 2019-10-08 | End: 2019-10-13 | Stop reason: HOSPADM

## 2019-10-08 RX ORDER — LORAZEPAM 2 MG/ML
2 INJECTION INTRAMUSCULAR ONCE
Status: COMPLETED | OUTPATIENT
Start: 2019-10-08 | End: 2019-10-08

## 2019-10-08 RX ADMIN — LEVOTHYROXINE SODIUM ANHYDROUS 160 MCG: 100 INJECTION, POWDER, LYOPHILIZED, FOR SOLUTION INTRAVENOUS at 07:01

## 2019-10-08 RX ADMIN — SEVELAMER CARBONATE 800 MG: 800 TABLET, FILM COATED ORAL at 12:09

## 2019-10-08 RX ADMIN — Medication 10 ML: at 21:24

## 2019-10-08 RX ADMIN — Medication 10 ML: at 10:13

## 2019-10-08 RX ADMIN — SODIUM CHLORIDE: 9 INJECTION, SOLUTION INTRAVENOUS at 21:18

## 2019-10-08 RX ADMIN — DEXTROSE MONOHYDRATE 7.5 MG/HR: 50 INJECTION, SOLUTION INTRAVENOUS at 23:41

## 2019-10-08 RX ADMIN — LEVOTHYROXINE SODIUM 150 MCG: 75 TABLET ORAL at 10:03

## 2019-10-08 RX ADMIN — INSULIN HUMAN 10 UNITS: 100 INJECTION, SOLUTION PARENTERAL at 05:26

## 2019-10-08 RX ADMIN — SODIUM CHLORIDE 0.1 UNITS/KG/HR: 9 INJECTION, SOLUTION INTRAVENOUS at 05:33

## 2019-10-08 RX ADMIN — NIFEDIPINE 60 MG: 60 TABLET, FILM COATED, EXTENDED RELEASE ORAL at 10:00

## 2019-10-08 RX ADMIN — METOPROLOL SUCCINATE 100 MG: 50 TABLET, EXTENDED RELEASE ORAL at 10:03

## 2019-10-08 RX ADMIN — SODIUM CHLORIDE: 9 INJECTION, SOLUTION INTRAVENOUS at 09:46

## 2019-10-08 RX ADMIN — INSULIN LISPRO 3 UNITS: 100 INJECTION, SOLUTION INTRAVENOUS; SUBCUTANEOUS at 21:24

## 2019-10-08 RX ADMIN — ENOXAPARIN SODIUM 30 MG: 30 INJECTION SUBCUTANEOUS at 10:00

## 2019-10-08 RX ADMIN — DEXTROSE MONOHYDRATE 5 MG/HR: 50 INJECTION, SOLUTION INTRAVENOUS at 19:22

## 2019-10-08 RX ADMIN — LEVETIRACETAM 500 MG: 5 INJECTION INTRAVENOUS at 18:22

## 2019-10-08 RX ADMIN — ONDANSETRON 4 MG: 2 INJECTION INTRAMUSCULAR; INTRAVENOUS at 18:21

## 2019-10-08 RX ADMIN — LIOTHYRONINE SODIUM 2 ML: 10 INJECTION INTRAVENOUS at 06:55

## 2019-10-08 RX ADMIN — LEVETIRACETAM 500 MG: 5 INJECTION INTRAVENOUS at 06:15

## 2019-10-08 RX ADMIN — DEXTROSE MONOHYDRATE 5 MG/HR: 50 INJECTION, SOLUTION INTRAVENOUS at 11:07

## 2019-10-08 RX ADMIN — LORAZEPAM 2 MG: 2 INJECTION INTRAMUSCULAR; INTRAVENOUS at 04:57

## 2019-10-08 RX ADMIN — SODIUM CHLORIDE 1000 ML: 9 INJECTION, SOLUTION INTRAVENOUS at 05:17

## 2019-10-08 RX ADMIN — PROMETHAZINE HYDROCHLORIDE 12.5 MG: 12.5 TABLET ORAL at 21:18

## 2019-10-08 RX ADMIN — ROPINIROLE HYDROCHLORIDE 1 MG: 1 TABLET, FILM COATED ORAL at 15:39

## 2019-10-08 RX ADMIN — DULOXETINE HYDROCHLORIDE 60 MG: 60 CAPSULE, DELAYED RELEASE ORAL at 10:00

## 2019-10-08 RX ADMIN — SIMVASTATIN 40 MG: 40 TABLET, FILM COATED ORAL at 21:18

## 2019-10-08 RX ADMIN — DEXTROSE MONOHYDRATE 5 MG/HR: 50 INJECTION, SOLUTION INTRAVENOUS at 05:33

## 2019-10-08 ASSESSMENT — ENCOUNTER SYMPTOMS
NAUSEA: 0
SHORTNESS OF BREATH: 0
BACK PAIN: 1
PHOTOPHOBIA: 0
VOMITING: 0
COUGH: 0

## 2019-10-09 ENCOUNTER — APPOINTMENT (OUTPATIENT)
Dept: MRI IMAGING | Age: 49
DRG: 637 | End: 2019-10-09
Payer: MEDICARE

## 2019-10-09 LAB
ANION GAP SERPL CALCULATED.3IONS-SCNC: 14 MMOL/L (ref 7–19)
BUN BLDV-MCNC: 19 MG/DL (ref 6–20)
CALCIUM SERPL-MCNC: 8.6 MG/DL (ref 8.6–10)
CHLORIDE BLD-SCNC: 93 MMOL/L (ref 98–111)
CO2: 24 MMOL/L (ref 22–29)
CREAT SERPL-MCNC: 4.2 MG/DL (ref 0.5–0.9)
EKG P AXIS: 75 DEGREES
EKG P-R INTERVAL: 172 MS
EKG Q-T INTERVAL: 432 MS
EKG QRS DURATION: 92 MS
EKG QTC CALCULATION (BAZETT): 446 MS
EKG T AXIS: 77 DEGREES
GFR NON-AFRICAN AMERICAN: 11
GLUCOSE BLD-MCNC: 160 MG/DL (ref 70–99)
GLUCOSE BLD-MCNC: 176 MG/DL (ref 70–99)
GLUCOSE BLD-MCNC: 178 MG/DL (ref 74–109)
GLUCOSE BLD-MCNC: 237 MG/DL (ref 70–99)
GLUCOSE BLD-MCNC: 313 MG/DL (ref 70–99)
HCT VFR BLD CALC: 33.7 % (ref 37–47)
HEMOGLOBIN: 10.8 G/DL (ref 12–16)
MCH RBC QN AUTO: 30.9 PG (ref 27–31)
MCHC RBC AUTO-ENTMCNC: 32 G/DL (ref 33–37)
MCV RBC AUTO: 96.6 FL (ref 81–99)
PDW BLD-RTO: 13.5 % (ref 11.5–14.5)
PERFORMED ON: ABNORMAL
PLATELET # BLD: 138 K/UL (ref 130–400)
PMV BLD AUTO: 10.7 FL (ref 9.4–12.3)
POTASSIUM REFLEX MAGNESIUM: 3.6 MMOL/L (ref 3.5–5)
RBC # BLD: 3.49 M/UL (ref 4.2–5.4)
SODIUM BLD-SCNC: 131 MMOL/L (ref 136–145)
WBC # BLD: 6.8 K/UL (ref 4.8–10.8)

## 2019-10-09 PROCEDURE — 85027 COMPLETE CBC AUTOMATED: CPT

## 2019-10-09 PROCEDURE — 6370000000 HC RX 637 (ALT 250 FOR IP): Performed by: HOSPITALIST

## 2019-10-09 PROCEDURE — 82948 REAGENT STRIP/BLOOD GLUCOSE: CPT

## 2019-10-09 PROCEDURE — 2580000003 HC RX 258: Performed by: HOSPITALIST

## 2019-10-09 PROCEDURE — 93010 ELECTROCARDIOGRAM REPORT: CPT | Performed by: INTERNAL MEDICINE

## 2019-10-09 PROCEDURE — 36415 COLL VENOUS BLD VENIPUNCTURE: CPT

## 2019-10-09 PROCEDURE — 2500000003 HC RX 250 WO HCPCS: Performed by: HOSPITALIST

## 2019-10-09 PROCEDURE — 99232 SBSQ HOSP IP/OBS MODERATE 35: CPT | Performed by: PSYCHIATRY & NEUROLOGY

## 2019-10-09 PROCEDURE — 99232 SBSQ HOSP IP/OBS MODERATE 35: CPT | Performed by: NURSE PRACTITIONER

## 2019-10-09 PROCEDURE — 6360000002 HC RX W HCPCS: Performed by: HOSPITALIST

## 2019-10-09 PROCEDURE — 2000000000 HC ICU R&B

## 2019-10-09 PROCEDURE — 6370000000 HC RX 637 (ALT 250 FOR IP): Performed by: PSYCHIATRY & NEUROLOGY

## 2019-10-09 PROCEDURE — 70551 MRI BRAIN STEM W/O DYE: CPT

## 2019-10-09 PROCEDURE — 80048 BASIC METABOLIC PNL TOTAL CA: CPT

## 2019-10-09 RX ORDER — LEVETIRACETAM 500 MG/1
500 TABLET ORAL 2 TIMES DAILY
Status: DISCONTINUED | OUTPATIENT
Start: 2019-10-09 | End: 2019-10-13 | Stop reason: HOSPADM

## 2019-10-09 RX ORDER — HYDRALAZINE HYDROCHLORIDE 25 MG/1
25 TABLET, FILM COATED ORAL EVERY 8 HOURS SCHEDULED
Status: DISCONTINUED | OUTPATIENT
Start: 2019-10-09 | End: 2019-10-10

## 2019-10-09 RX ORDER — HYDRALAZINE HYDROCHLORIDE 20 MG/ML
10 INJECTION INTRAMUSCULAR; INTRAVENOUS EVERY 6 HOURS PRN
Status: DISCONTINUED | OUTPATIENT
Start: 2019-10-09 | End: 2019-10-13 | Stop reason: HOSPADM

## 2019-10-09 RX ADMIN — NIFEDIPINE 60 MG: 60 TABLET, FILM COATED, EXTENDED RELEASE ORAL at 09:39

## 2019-10-09 RX ADMIN — ENOXAPARIN SODIUM 30 MG: 30 INJECTION SUBCUTANEOUS at 09:40

## 2019-10-09 RX ADMIN — DEXTROSE MONOHYDRATE 5 MG/HR: 50 INJECTION, SOLUTION INTRAVENOUS at 04:35

## 2019-10-09 RX ADMIN — LEVETIRACETAM 500 MG: 500 TABLET, FILM COATED ORAL at 21:20

## 2019-10-09 RX ADMIN — LEVOTHYROXINE SODIUM 150 MCG: 75 TABLET ORAL at 09:39

## 2019-10-09 RX ADMIN — DULOXETINE HYDROCHLORIDE 60 MG: 60 CAPSULE, DELAYED RELEASE ORAL at 09:39

## 2019-10-09 RX ADMIN — SIMVASTATIN 40 MG: 40 TABLET, FILM COATED ORAL at 21:20

## 2019-10-09 RX ADMIN — INSULIN LISPRO 6 UNITS: 100 INJECTION, SOLUTION INTRAVENOUS; SUBCUTANEOUS at 13:03

## 2019-10-09 RX ADMIN — DEXTROSE MONOHYDRATE 5 MG/HR: 50 INJECTION, SOLUTION INTRAVENOUS at 08:58

## 2019-10-09 RX ADMIN — NIFEDIPINE 30 MG: 60 TABLET, FILM COATED, EXTENDED RELEASE ORAL at 12:49

## 2019-10-09 RX ADMIN — HYDRALAZINE HYDROCHLORIDE 25 MG: 25 TABLET, FILM COATED ORAL at 10:37

## 2019-10-09 RX ADMIN — INSULIN LISPRO 2 UNITS: 100 INJECTION, SOLUTION INTRAVENOUS; SUBCUTANEOUS at 21:29

## 2019-10-09 RX ADMIN — INSULIN LISPRO 3 UNITS: 100 INJECTION, SOLUTION INTRAVENOUS; SUBCUTANEOUS at 17:51

## 2019-10-09 RX ADMIN — METOPROLOL SUCCINATE 100 MG: 50 TABLET, EXTENDED RELEASE ORAL at 09:39

## 2019-10-09 RX ADMIN — INSULIN LISPRO 12 UNITS: 100 INJECTION, SOLUTION INTRAVENOUS; SUBCUTANEOUS at 08:01

## 2019-10-09 RX ADMIN — Medication 10 ML: at 21:20

## 2019-10-09 RX ADMIN — SODIUM CHLORIDE: 9 INJECTION, SOLUTION INTRAVENOUS at 15:40

## 2019-10-09 RX ADMIN — SEVELAMER CARBONATE 800 MG: 800 TABLET, FILM COATED ORAL at 17:44

## 2019-10-09 RX ADMIN — Medication 10 ML: at 13:12

## 2019-10-09 RX ADMIN — HYDRALAZINE HYDROCHLORIDE 25 MG: 25 TABLET, FILM COATED ORAL at 21:20

## 2019-10-09 RX ADMIN — VANCOMYCIN HYDROCHLORIDE 1000 MG: 10 INJECTION, POWDER, LYOPHILIZED, FOR SOLUTION INTRAVENOUS at 09:55

## 2019-10-09 RX ADMIN — LEVETIRACETAM 500 MG: 500 TABLET, FILM COATED ORAL at 09:39

## 2019-10-09 RX ADMIN — SEVELAMER CARBONATE 800 MG: 800 TABLET, FILM COATED ORAL at 08:10

## 2019-10-09 ASSESSMENT — ENCOUNTER SYMPTOMS
GASTROINTESTINAL NEGATIVE: 1
EYES NEGATIVE: 1
RESPIRATORY NEGATIVE: 1

## 2019-10-10 PROBLEM — Z51.5 PALLIATIVE CARE PATIENT: Status: ACTIVE | Noted: 2019-10-08

## 2019-10-10 LAB
AMPHETAMINE SCREEN, URINE: NEGATIVE
ANION GAP SERPL CALCULATED.3IONS-SCNC: 17 MMOL/L (ref 7–19)
BARBITURATE SCREEN URINE: NEGATIVE
BENZODIAZEPINE SCREEN, URINE: NEGATIVE
BLOOD CULTURE, ROUTINE: ABNORMAL
BLOOD CULTURE, ROUTINE: ABNORMAL
BUN BLDV-MCNC: 26 MG/DL (ref 6–20)
CALCIUM SERPL-MCNC: 8.6 MG/DL (ref 8.6–10)
CANNABINOID SCREEN URINE: NEGATIVE
CHLORIDE BLD-SCNC: 92 MMOL/L (ref 98–111)
CO2: 21 MMOL/L (ref 22–29)
COCAINE METABOLITE SCREEN URINE: NEGATIVE
CREAT SERPL-MCNC: 5.7 MG/DL (ref 0.5–0.9)
GFR NON-AFRICAN AMERICAN: 8
GLUCOSE BLD-MCNC: 209 MG/DL (ref 70–99)
GLUCOSE BLD-MCNC: 209 MG/DL (ref 74–109)
GLUCOSE BLD-MCNC: 254 MG/DL (ref 70–99)
GLUCOSE BLD-MCNC: 256 MG/DL (ref 70–99)
HCT VFR BLD CALC: 31.8 % (ref 37–47)
HEMOGLOBIN: 10.2 G/DL (ref 12–16)
Lab: NORMAL
MCH RBC QN AUTO: 30.8 PG (ref 27–31)
MCHC RBC AUTO-ENTMCNC: 32.1 G/DL (ref 33–37)
MCV RBC AUTO: 96.1 FL (ref 81–99)
OPIATE SCREEN URINE: NEGATIVE
ORGANISM: ABNORMAL
PDW BLD-RTO: 14 % (ref 11.5–14.5)
PERFORMED ON: ABNORMAL
PLATELET # BLD: 147 K/UL (ref 130–400)
PMV BLD AUTO: 10.2 FL (ref 9.4–12.3)
POTASSIUM REFLEX MAGNESIUM: 4.6 MMOL/L (ref 3.5–5)
RBC # BLD: 3.31 M/UL (ref 4.2–5.4)
SODIUM BLD-SCNC: 130 MMOL/L (ref 136–145)
TROPONIN: 0.02 NG/ML (ref 0–0.03)
VANCOMYCIN RANDOM: 7.4 UG/ML
WBC # BLD: 5.7 K/UL (ref 4.8–10.8)

## 2019-10-10 PROCEDURE — 2580000003 HC RX 258: Performed by: HOSPITALIST

## 2019-10-10 PROCEDURE — 80202 ASSAY OF VANCOMYCIN: CPT

## 2019-10-10 PROCEDURE — 85027 COMPLETE CBC AUTOMATED: CPT

## 2019-10-10 PROCEDURE — 99232 SBSQ HOSP IP/OBS MODERATE 35: CPT | Performed by: PSYCHIATRY & NEUROLOGY

## 2019-10-10 PROCEDURE — 82948 REAGENT STRIP/BLOOD GLUCOSE: CPT

## 2019-10-10 PROCEDURE — 93005 ELECTROCARDIOGRAM TRACING: CPT | Performed by: INTERNAL MEDICINE

## 2019-10-10 PROCEDURE — 2500000003 HC RX 250 WO HCPCS: Performed by: INTERNAL MEDICINE

## 2019-10-10 PROCEDURE — 80048 BASIC METABOLIC PNL TOTAL CA: CPT

## 2019-10-10 PROCEDURE — 6370000000 HC RX 637 (ALT 250 FOR IP): Performed by: HOSPITALIST

## 2019-10-10 PROCEDURE — 6360000002 HC RX W HCPCS: Performed by: HOSPITALIST

## 2019-10-10 PROCEDURE — 36415 COLL VENOUS BLD VENIPUNCTURE: CPT

## 2019-10-10 PROCEDURE — 84484 ASSAY OF TROPONIN QUANT: CPT

## 2019-10-10 PROCEDURE — 92523 SPEECH SOUND LANG COMPREHEN: CPT

## 2019-10-10 PROCEDURE — 6370000000 HC RX 637 (ALT 250 FOR IP): Performed by: INTERNAL MEDICINE

## 2019-10-10 PROCEDURE — 92526 ORAL FUNCTION THERAPY: CPT

## 2019-10-10 PROCEDURE — 80307 DRUG TEST PRSMV CHEM ANLYZR: CPT

## 2019-10-10 PROCEDURE — 87040 BLOOD CULTURE FOR BACTERIA: CPT

## 2019-10-10 PROCEDURE — 2580000003 HC RX 258: Performed by: INTERNAL MEDICINE

## 2019-10-10 PROCEDURE — 8010000000 HC HEMODIALYSIS ACUTE INPT

## 2019-10-10 PROCEDURE — 2000000000 HC ICU R&B

## 2019-10-10 RX ORDER — LISINOPRIL 20 MG/1
40 TABLET ORAL DAILY
Status: DISCONTINUED | OUTPATIENT
Start: 2019-10-11 | End: 2019-10-13 | Stop reason: HOSPADM

## 2019-10-10 RX ORDER — LISINOPRIL 20 MG/1
20 TABLET ORAL 2 TIMES DAILY
Status: DISCONTINUED | OUTPATIENT
Start: 2019-10-10 | End: 2019-10-10

## 2019-10-10 RX ORDER — ISOSORBIDE MONONITRATE 60 MG/1
60 TABLET, EXTENDED RELEASE ORAL DAILY
Status: DISCONTINUED | OUTPATIENT
Start: 2019-10-10 | End: 2019-10-11

## 2019-10-10 RX ORDER — CLONIDINE HYDROCHLORIDE 0.1 MG/1
0.2 TABLET ORAL EVERY 8 HOURS SCHEDULED
Status: DISCONTINUED | OUTPATIENT
Start: 2019-10-10 | End: 2019-10-11

## 2019-10-10 RX ORDER — HYDRALAZINE HYDROCHLORIDE 50 MG/1
50 TABLET, FILM COATED ORAL EVERY 8 HOURS SCHEDULED
Status: DISCONTINUED | OUTPATIENT
Start: 2019-10-10 | End: 2019-10-11

## 2019-10-10 RX ADMIN — VANCOMYCIN HYDROCHLORIDE 1250 MG: 10 INJECTION, POWDER, LYOPHILIZED, FOR SOLUTION INTRAVENOUS at 16:54

## 2019-10-10 RX ADMIN — CLONIDINE HYDROCHLORIDE 0.2 MG: 0.1 TABLET ORAL at 22:24

## 2019-10-10 RX ADMIN — LEVETIRACETAM 500 MG: 500 TABLET, FILM COATED ORAL at 20:25

## 2019-10-10 RX ADMIN — HYDRALAZINE HYDROCHLORIDE 50 MG: 50 TABLET, FILM COATED ORAL at 15:31

## 2019-10-10 RX ADMIN — Medication 10 ML: at 20:25

## 2019-10-10 RX ADMIN — SEVELAMER CARBONATE 800 MG: 800 TABLET, FILM COATED ORAL at 16:54

## 2019-10-10 RX ADMIN — INSULIN LISPRO 5 UNITS: 100 INJECTION, SOLUTION INTRAVENOUS; SUBCUTANEOUS at 20:26

## 2019-10-10 RX ADMIN — DEXTROSE MONOHYDRATE 5 MG/HR: 50 INJECTION, SOLUTION INTRAVENOUS at 19:18

## 2019-10-10 RX ADMIN — INSULIN LISPRO 9 UNITS: 100 INJECTION, SOLUTION INTRAVENOUS; SUBCUTANEOUS at 17:01

## 2019-10-10 RX ADMIN — INSULIN LISPRO 6 UNITS: 100 INJECTION, SOLUTION INTRAVENOUS; SUBCUTANEOUS at 10:18

## 2019-10-10 RX ADMIN — METOPROLOL SUCCINATE 100 MG: 50 TABLET, EXTENDED RELEASE ORAL at 07:05

## 2019-10-10 RX ADMIN — ACETAMINOPHEN 650 MG: 325 TABLET ORAL at 14:30

## 2019-10-10 RX ADMIN — HYDRALAZINE HYDROCHLORIDE 10 MG: 20 INJECTION INTRAMUSCULAR; INTRAVENOUS at 12:41

## 2019-10-10 RX ADMIN — ISOSORBIDE MONONITRATE 60 MG: 60 TABLET, EXTENDED RELEASE ORAL at 16:54

## 2019-10-10 RX ADMIN — SIMVASTATIN 40 MG: 40 TABLET, FILM COATED ORAL at 20:25

## 2019-10-10 RX ADMIN — CLONIDINE HYDROCHLORIDE 0.2 MG: 0.1 TABLET ORAL at 16:55

## 2019-10-10 RX ADMIN — LISINOPRIL 20 MG: 20 TABLET ORAL at 14:30

## 2019-10-10 RX ADMIN — HYDRALAZINE HYDROCHLORIDE 25 MG: 25 TABLET, FILM COATED ORAL at 07:05

## 2019-10-10 RX ADMIN — HYDRALAZINE HYDROCHLORIDE 50 MG: 50 TABLET, FILM COATED ORAL at 22:24

## 2019-10-10 RX ADMIN — HYDRALAZINE HYDROCHLORIDE 10 MG: 20 INJECTION INTRAMUSCULAR; INTRAVENOUS at 14:30

## 2019-10-10 RX ADMIN — LEVETIRACETAM 500 MG: 500 TABLET, FILM COATED ORAL at 10:10

## 2019-10-10 RX ADMIN — LEVOTHYROXINE SODIUM 150 MCG: 75 TABLET ORAL at 07:05

## 2019-10-10 ASSESSMENT — PAIN SCALES - GENERAL
PAINLEVEL_OUTOF10: 0

## 2019-10-11 LAB
ALBUMIN SERPL-MCNC: 3.2 G/DL (ref 3.5–5.2)
ALP BLD-CCNC: 115 U/L (ref 35–104)
ALT SERPL-CCNC: 15 U/L (ref 5–33)
ANION GAP SERPL CALCULATED.3IONS-SCNC: 12 MMOL/L (ref 7–19)
AST SERPL-CCNC: 16 U/L (ref 5–32)
BASOPHILS ABSOLUTE: 0.1 K/UL (ref 0–0.2)
BASOPHILS RELATIVE PERCENT: 1.4 % (ref 0–1)
BILIRUB SERPL-MCNC: 0.3 MG/DL (ref 0.2–1.2)
BUN BLDV-MCNC: 17 MG/DL (ref 6–20)
C-REACTIVE PROTEIN: 0.97 MG/DL (ref 0–0.5)
CALCIUM SERPL-MCNC: 8.4 MG/DL (ref 8.6–10)
CHLORIDE BLD-SCNC: 95 MMOL/L (ref 98–111)
CO2: 26 MMOL/L (ref 22–29)
CREAT SERPL-MCNC: 4.1 MG/DL (ref 0.5–0.9)
EOSINOPHILS ABSOLUTE: 0.1 K/UL (ref 0–0.6)
EOSINOPHILS RELATIVE PERCENT: 2.7 % (ref 0–5)
GFR NON-AFRICAN AMERICAN: 11
GLUCOSE BLD-MCNC: 143 MG/DL (ref 70–99)
GLUCOSE BLD-MCNC: 196 MG/DL (ref 74–109)
GLUCOSE BLD-MCNC: 307 MG/DL (ref 70–99)
GLUCOSE BLD-MCNC: 356 MG/DL (ref 70–99)
GLUCOSE BLD-MCNC: 72 MG/DL (ref 70–99)
HCT VFR BLD CALC: 26.9 % (ref 37–47)
HEMOGLOBIN: 8.6 G/DL (ref 12–16)
IMMATURE GRANULOCYTES #: 0 K/UL
LYMPHOCYTES ABSOLUTE: 1 K/UL (ref 1.1–4.5)
LYMPHOCYTES RELATIVE PERCENT: 22.1 % (ref 20–40)
MCH RBC QN AUTO: 31 PG (ref 27–31)
MCHC RBC AUTO-ENTMCNC: 32 G/DL (ref 33–37)
MCV RBC AUTO: 97.1 FL (ref 81–99)
MONOCYTES ABSOLUTE: 0.4 K/UL (ref 0–0.9)
MONOCYTES RELATIVE PERCENT: 9.1 % (ref 0–10)
NEUTROPHILS ABSOLUTE: 2.8 K/UL (ref 1.5–7.5)
NEUTROPHILS RELATIVE PERCENT: 64.7 % (ref 50–65)
PDW BLD-RTO: 14.3 % (ref 11.5–14.5)
PERFORMED ON: ABNORMAL
PERFORMED ON: NORMAL
PLATELET # BLD: 122 K/UL (ref 130–400)
PMV BLD AUTO: 10.4 FL (ref 9.4–12.3)
POTASSIUM SERPL-SCNC: 3.9 MMOL/L (ref 3.5–5)
RBC # BLD: 2.77 M/UL (ref 4.2–5.4)
SEDIMENTATION RATE, ERYTHROCYTE: 14 MM/HR (ref 0–25)
SODIUM BLD-SCNC: 133 MMOL/L (ref 136–145)
TOTAL PROTEIN: 5.7 G/DL (ref 6.6–8.7)
TROPONIN: 0.01 NG/ML (ref 0–0.03)
TROPONIN: 0.02 NG/ML (ref 0–0.03)
WBC # BLD: 4.4 K/UL (ref 4.8–10.8)

## 2019-10-11 PROCEDURE — 80053 COMPREHEN METABOLIC PANEL: CPT

## 2019-10-11 PROCEDURE — 85652 RBC SED RATE AUTOMATED: CPT

## 2019-10-11 PROCEDURE — 6370000000 HC RX 637 (ALT 250 FOR IP): Performed by: INTERNAL MEDICINE

## 2019-10-11 PROCEDURE — 92507 TX SP LANG VOICE COMM INDIV: CPT

## 2019-10-11 PROCEDURE — 6370000000 HC RX 637 (ALT 250 FOR IP): Performed by: HOSPITALIST

## 2019-10-11 PROCEDURE — 84484 ASSAY OF TROPONIN QUANT: CPT

## 2019-10-11 PROCEDURE — 36415 COLL VENOUS BLD VENIPUNCTURE: CPT

## 2019-10-11 PROCEDURE — 6360000002 HC RX W HCPCS: Performed by: HOSPITALIST

## 2019-10-11 PROCEDURE — 85025 COMPLETE CBC W/AUTO DIFF WBC: CPT

## 2019-10-11 PROCEDURE — 86140 C-REACTIVE PROTEIN: CPT

## 2019-10-11 PROCEDURE — 82948 REAGENT STRIP/BLOOD GLUCOSE: CPT

## 2019-10-11 PROCEDURE — 97127 HC SP THER IVNTJ W/FOCUS COG FUNCJ: CPT

## 2019-10-11 PROCEDURE — 1210000000 HC MED SURG R&B

## 2019-10-11 PROCEDURE — 92526 ORAL FUNCTION THERAPY: CPT

## 2019-10-11 PROCEDURE — 2580000003 HC RX 258: Performed by: HOSPITALIST

## 2019-10-11 RX ORDER — INSULIN GLARGINE 100 [IU]/ML
10 INJECTION, SOLUTION SUBCUTANEOUS NIGHTLY
Status: DISCONTINUED | OUTPATIENT
Start: 2019-10-11 | End: 2019-10-12

## 2019-10-11 RX ORDER — HYDROCHLOROTHIAZIDE 25 MG/1
25 TABLET ORAL DAILY
Status: DISCONTINUED | OUTPATIENT
Start: 2019-10-11 | End: 2019-10-13 | Stop reason: HOSPADM

## 2019-10-11 RX ORDER — ISOSORBIDE MONONITRATE 60 MG/1
120 TABLET, EXTENDED RELEASE ORAL DAILY
Status: DISCONTINUED | OUTPATIENT
Start: 2019-10-12 | End: 2019-10-13 | Stop reason: HOSPADM

## 2019-10-11 RX ORDER — ISOSORBIDE MONONITRATE 60 MG/1
60 TABLET, EXTENDED RELEASE ORAL ONCE
Status: COMPLETED | OUTPATIENT
Start: 2019-10-11 | End: 2019-10-11

## 2019-10-11 RX ADMIN — CLONIDINE HYDROCHLORIDE 0.3 MG: 0.2 TABLET ORAL at 21:32

## 2019-10-11 RX ADMIN — HYDROCHLOROTHIAZIDE 25 MG: 25 TABLET ORAL at 14:23

## 2019-10-11 RX ADMIN — ISOSORBIDE MONONITRATE 60 MG: 60 TABLET, EXTENDED RELEASE ORAL at 14:19

## 2019-10-11 RX ADMIN — LEVOTHYROXINE SODIUM 150 MCG: 75 TABLET ORAL at 06:11

## 2019-10-11 RX ADMIN — SEVELAMER CARBONATE 800 MG: 800 TABLET, FILM COATED ORAL at 08:04

## 2019-10-11 RX ADMIN — INSULIN LISPRO 15 UNITS: 100 INJECTION, SOLUTION INTRAVENOUS; SUBCUTANEOUS at 12:17

## 2019-10-11 RX ADMIN — METOPROLOL SUCCINATE 100 MG: 50 TABLET, EXTENDED RELEASE ORAL at 07:26

## 2019-10-11 RX ADMIN — LEVETIRACETAM 500 MG: 500 TABLET, FILM COATED ORAL at 20:30

## 2019-10-11 RX ADMIN — ACETAMINOPHEN 650 MG: 325 TABLET ORAL at 20:17

## 2019-10-11 RX ADMIN — HYDRALAZINE HYDROCHLORIDE 75 MG: 50 TABLET, FILM COATED ORAL at 11:50

## 2019-10-11 RX ADMIN — HYDRALAZINE HYDROCHLORIDE 50 MG: 50 TABLET, FILM COATED ORAL at 06:11

## 2019-10-11 RX ADMIN — CLONIDINE HYDROCHLORIDE 0.2 MG: 0.1 TABLET ORAL at 06:11

## 2019-10-11 RX ADMIN — LISINOPRIL 40 MG: 20 TABLET ORAL at 08:04

## 2019-10-11 RX ADMIN — HYDRALAZINE HYDROCHLORIDE 75 MG: 50 TABLET, FILM COATED ORAL at 17:36

## 2019-10-11 RX ADMIN — ONDANSETRON 4 MG: 2 INJECTION INTRAMUSCULAR; INTRAVENOUS at 19:58

## 2019-10-11 RX ADMIN — INSULIN GLARGINE 10 UNITS: 100 INJECTION, SOLUTION SUBCUTANEOUS at 20:05

## 2019-10-11 RX ADMIN — SEVELAMER CARBONATE 800 MG: 800 TABLET, FILM COATED ORAL at 11:50

## 2019-10-11 RX ADMIN — DULOXETINE HYDROCHLORIDE 60 MG: 60 CAPSULE, DELAYED RELEASE ORAL at 08:04

## 2019-10-11 RX ADMIN — INSULIN LISPRO 15 UNITS: 100 INJECTION, SOLUTION INTRAVENOUS; SUBCUTANEOUS at 08:55

## 2019-10-11 RX ADMIN — ISOSORBIDE MONONITRATE 60 MG: 60 TABLET, EXTENDED RELEASE ORAL at 08:04

## 2019-10-11 RX ADMIN — CLONIDINE HYDROCHLORIDE 0.3 MG: 0.2 TABLET ORAL at 14:19

## 2019-10-11 RX ADMIN — LEVETIRACETAM 500 MG: 500 TABLET, FILM COATED ORAL at 08:04

## 2019-10-11 RX ADMIN — ACETAMINOPHEN 650 MG: 325 TABLET ORAL at 15:53

## 2019-10-11 RX ADMIN — Medication 10 ML: at 08:06

## 2019-10-11 RX ADMIN — ENOXAPARIN SODIUM 30 MG: 30 INJECTION SUBCUTANEOUS at 08:04

## 2019-10-11 RX ADMIN — SIMVASTATIN 40 MG: 40 TABLET, FILM COATED ORAL at 20:16

## 2019-10-11 RX ADMIN — Medication 10 ML: at 20:18

## 2019-10-11 RX ADMIN — SEVELAMER CARBONATE 800 MG: 800 TABLET, FILM COATED ORAL at 17:36

## 2019-10-11 ASSESSMENT — PAIN SCALES - GENERAL
PAINLEVEL_OUTOF10: 0
PAINLEVEL_OUTOF10: 0
PAINLEVEL_OUTOF10: 8
PAINLEVEL_OUTOF10: 0
PAINLEVEL_OUTOF10: 0
PAINLEVEL_OUTOF10: 8
PAINLEVEL_OUTOF10: 0
PAINLEVEL_OUTOF10: 9
PAINLEVEL_OUTOF10: 4

## 2019-10-11 ASSESSMENT — PAIN DESCRIPTION - DESCRIPTORS: DESCRIPTORS: ACHING

## 2019-10-11 ASSESSMENT — PAIN DESCRIPTION - PAIN TYPE
TYPE: ACUTE PAIN
TYPE: ACUTE PAIN

## 2019-10-11 ASSESSMENT — PAIN DESCRIPTION - LOCATION
LOCATION: HEAD
LOCATION: HEAD

## 2019-10-12 LAB
ALBUMIN SERPL-MCNC: 3.2 G/DL (ref 3.5–5.2)
ALP BLD-CCNC: 101 U/L (ref 35–104)
ALT SERPL-CCNC: 13 U/L (ref 5–33)
ANION GAP SERPL CALCULATED.3IONS-SCNC: 13 MMOL/L (ref 7–19)
AST SERPL-CCNC: 15 U/L (ref 5–32)
BASOPHILS ABSOLUTE: 0.1 K/UL (ref 0–0.2)
BASOPHILS RELATIVE PERCENT: 1.4 % (ref 0–1)
BILIRUB SERPL-MCNC: 0.3 MG/DL (ref 0.2–1.2)
BUN BLDV-MCNC: 32 MG/DL (ref 6–20)
CALCIUM SERPL-MCNC: 8.4 MG/DL (ref 8.6–10)
CHLORIDE BLD-SCNC: 93 MMOL/L (ref 98–111)
CO2: 23 MMOL/L (ref 22–29)
CREAT SERPL-MCNC: 4.8 MG/DL (ref 0.5–0.9)
EOSINOPHILS ABSOLUTE: 0.2 K/UL (ref 0–0.6)
EOSINOPHILS RELATIVE PERCENT: 3.6 % (ref 0–5)
GFR NON-AFRICAN AMERICAN: 10
GLUCOSE BLD-MCNC: 145 MG/DL (ref 70–99)
GLUCOSE BLD-MCNC: 304 MG/DL (ref 70–99)
GLUCOSE BLD-MCNC: 385 MG/DL (ref 70–99)
GLUCOSE BLD-MCNC: 65 MG/DL (ref 74–109)
GLUCOSE BLD-MCNC: 66 MG/DL (ref 70–99)
GLUCOSE BLD-MCNC: 66 MG/DL (ref 70–99)
HCT VFR BLD CALC: 27.2 % (ref 37–47)
HEMOGLOBIN: 8.6 G/DL (ref 12–16)
IMMATURE GRANULOCYTES #: 0 K/UL
LYMPHOCYTES ABSOLUTE: 1 K/UL (ref 1.1–4.5)
LYMPHOCYTES RELATIVE PERCENT: 23.1 % (ref 20–40)
MCH RBC QN AUTO: 31.2 PG (ref 27–31)
MCHC RBC AUTO-ENTMCNC: 31.6 G/DL (ref 33–37)
MCV RBC AUTO: 98.6 FL (ref 81–99)
MONOCYTES ABSOLUTE: 0.5 K/UL (ref 0–0.9)
MONOCYTES RELATIVE PERCENT: 10.8 % (ref 0–10)
NEUTROPHILS ABSOLUTE: 2.5 K/UL (ref 1.5–7.5)
NEUTROPHILS RELATIVE PERCENT: 60.9 % (ref 50–65)
PDW BLD-RTO: 14.5 % (ref 11.5–14.5)
PERFORMED ON: ABNORMAL
PLATELET # BLD: 112 K/UL (ref 130–400)
PMV BLD AUTO: 10.9 FL (ref 9.4–12.3)
POTASSIUM SERPL-SCNC: 4.9 MMOL/L (ref 3.5–5)
RBC # BLD: 2.76 M/UL (ref 4.2–5.4)
SODIUM BLD-SCNC: 129 MMOL/L (ref 136–145)
TOTAL PROTEIN: 5.5 G/DL (ref 6.6–8.7)
VANCOMYCIN RANDOM: 12.8 UG/ML
WBC # BLD: 4.2 K/UL (ref 4.8–10.8)

## 2019-10-12 PROCEDURE — 6360000002 HC RX W HCPCS: Performed by: HOSPITALIST

## 2019-10-12 PROCEDURE — 80053 COMPREHEN METABOLIC PANEL: CPT

## 2019-10-12 PROCEDURE — 2580000003 HC RX 258: Performed by: HOSPITALIST

## 2019-10-12 PROCEDURE — 6370000000 HC RX 637 (ALT 250 FOR IP): Performed by: INTERNAL MEDICINE

## 2019-10-12 PROCEDURE — 82948 REAGENT STRIP/BLOOD GLUCOSE: CPT

## 2019-10-12 PROCEDURE — 6370000000 HC RX 637 (ALT 250 FOR IP): Performed by: HOSPITALIST

## 2019-10-12 PROCEDURE — 8010000000 HC HEMODIALYSIS ACUTE INPT

## 2019-10-12 PROCEDURE — 1210000000 HC MED SURG R&B

## 2019-10-12 PROCEDURE — 85025 COMPLETE CBC W/AUTO DIFF WBC: CPT

## 2019-10-12 PROCEDURE — 36415 COLL VENOUS BLD VENIPUNCTURE: CPT

## 2019-10-12 PROCEDURE — 80202 ASSAY OF VANCOMYCIN: CPT

## 2019-10-12 RX ORDER — HEPARIN SODIUM 1000 [USP'U]/ML
3600 INJECTION, SOLUTION INTRAVENOUS; SUBCUTANEOUS
Status: DISCONTINUED | OUTPATIENT
Start: 2019-10-12 | End: 2019-10-12

## 2019-10-12 RX ADMIN — SEVELAMER CARBONATE 800 MG: 800 TABLET, FILM COATED ORAL at 17:12

## 2019-10-12 RX ADMIN — LEVETIRACETAM 500 MG: 500 TABLET, FILM COATED ORAL at 19:46

## 2019-10-12 RX ADMIN — LEVOTHYROXINE SODIUM 150 MCG: 75 TABLET ORAL at 06:13

## 2019-10-12 RX ADMIN — CLONIDINE HYDROCHLORIDE 0.3 MG: 0.2 TABLET ORAL at 06:12

## 2019-10-12 RX ADMIN — SEVELAMER CARBONATE 800 MG: 800 TABLET, FILM COATED ORAL at 12:16

## 2019-10-12 RX ADMIN — CLONIDINE HYDROCHLORIDE 0.3 MG: 0.2 TABLET ORAL at 20:07

## 2019-10-12 RX ADMIN — LISINOPRIL 40 MG: 20 TABLET ORAL at 12:17

## 2019-10-12 RX ADMIN — CLONIDINE HYDROCHLORIDE 0.3 MG: 0.2 TABLET ORAL at 12:17

## 2019-10-12 RX ADMIN — HYDRALAZINE HYDROCHLORIDE 75 MG: 50 TABLET, FILM COATED ORAL at 12:17

## 2019-10-12 RX ADMIN — INSULIN LISPRO 7 UNITS: 100 INJECTION, SOLUTION INTRAVENOUS; SUBCUTANEOUS at 20:54

## 2019-10-12 RX ADMIN — VANCOMYCIN HYDROCHLORIDE 1250 MG: 10 INJECTION, POWDER, LYOPHILIZED, FOR SOLUTION INTRAVENOUS at 17:11

## 2019-10-12 RX ADMIN — Medication 10 ML: at 12:17

## 2019-10-12 RX ADMIN — ISOSORBIDE MONONITRATE 120 MG: 60 TABLET, EXTENDED RELEASE ORAL at 12:16

## 2019-10-12 RX ADMIN — HYDRALAZINE HYDROCHLORIDE 75 MG: 50 TABLET, FILM COATED ORAL at 17:12

## 2019-10-12 RX ADMIN — SIMVASTATIN 40 MG: 40 TABLET, FILM COATED ORAL at 19:46

## 2019-10-12 RX ADMIN — METOPROLOL SUCCINATE 100 MG: 50 TABLET, EXTENDED RELEASE ORAL at 06:13

## 2019-10-12 RX ADMIN — ACETAMINOPHEN 650 MG: 325 TABLET ORAL at 02:54

## 2019-10-12 RX ADMIN — LEVETIRACETAM 500 MG: 500 TABLET, FILM COATED ORAL at 12:17

## 2019-10-12 RX ADMIN — HYDROCHLOROTHIAZIDE 25 MG: 25 TABLET ORAL at 12:16

## 2019-10-12 RX ADMIN — HYDRALAZINE HYDROCHLORIDE 75 MG: 50 TABLET, FILM COATED ORAL at 00:45

## 2019-10-12 RX ADMIN — DULOXETINE HYDROCHLORIDE 60 MG: 60 CAPSULE, DELAYED RELEASE ORAL at 12:17

## 2019-10-12 RX ADMIN — HYDRALAZINE HYDROCHLORIDE 75 MG: 50 TABLET, FILM COATED ORAL at 06:13

## 2019-10-12 RX ADMIN — HYDRALAZINE HYDROCHLORIDE 75 MG: 50 TABLET, FILM COATED ORAL at 23:37

## 2019-10-12 RX ADMIN — Medication 10 ML: at 19:47

## 2019-10-12 ASSESSMENT — PAIN DESCRIPTION - DESCRIPTORS: DESCRIPTORS: ACHING

## 2019-10-12 ASSESSMENT — PAIN SCALES - GENERAL
PAINLEVEL_OUTOF10: 0
PAINLEVEL_OUTOF10: 8
PAINLEVEL_OUTOF10: 0

## 2019-10-12 ASSESSMENT — PAIN DESCRIPTION - ONSET: ONSET: GRADUAL

## 2019-10-12 ASSESSMENT — PAIN DESCRIPTION - ORIENTATION: ORIENTATION: ANTERIOR;MID

## 2019-10-12 ASSESSMENT — PAIN DESCRIPTION - FREQUENCY: FREQUENCY: INTERMITTENT

## 2019-10-12 ASSESSMENT — PAIN DESCRIPTION - PAIN TYPE: TYPE: ACUTE PAIN

## 2019-10-12 ASSESSMENT — PAIN DESCRIPTION - LOCATION: LOCATION: HEAD

## 2019-10-12 ASSESSMENT — PAIN - FUNCTIONAL ASSESSMENT: PAIN_FUNCTIONAL_ASSESSMENT: ACTIVITIES ARE NOT PREVENTED

## 2019-10-12 ASSESSMENT — PAIN DESCRIPTION - PROGRESSION: CLINICAL_PROGRESSION: GRADUALLY IMPROVING

## 2019-10-13 VITALS
RESPIRATION RATE: 18 BRPM | SYSTOLIC BLOOD PRESSURE: 178 MMHG | WEIGHT: 168.7 LBS | BODY MASS INDEX: 27.11 KG/M2 | TEMPERATURE: 98.1 F | DIASTOLIC BLOOD PRESSURE: 75 MMHG | HEART RATE: 57 BPM | HEIGHT: 66 IN | OXYGEN SATURATION: 96 %

## 2019-10-13 LAB
ALBUMIN SERPL-MCNC: 3.2 G/DL (ref 3.5–5.2)
ALP BLD-CCNC: 116 U/L (ref 35–104)
ALT SERPL-CCNC: 12 U/L (ref 5–33)
ANION GAP SERPL CALCULATED.3IONS-SCNC: 13 MMOL/L (ref 7–19)
AST SERPL-CCNC: 16 U/L (ref 5–32)
BASOPHILS ABSOLUTE: 0.1 K/UL (ref 0–0.2)
BASOPHILS RELATIVE PERCENT: 1.7 % (ref 0–1)
BILIRUB SERPL-MCNC: 0.3 MG/DL (ref 0.2–1.2)
BUN BLDV-MCNC: 32 MG/DL (ref 6–20)
CALCIUM SERPL-MCNC: 8.8 MG/DL (ref 8.6–10)
CHLORIDE BLD-SCNC: 91 MMOL/L (ref 98–111)
CO2: 23 MMOL/L (ref 22–29)
CREAT SERPL-MCNC: 4.7 MG/DL (ref 0.5–0.9)
CULTURE, BLOOD 2: NORMAL
EKG P AXIS: 58 DEGREES
EKG P-R INTERVAL: 110 MS
EKG Q-T INTERVAL: 394 MS
EKG QRS DURATION: 96 MS
EKG QTC CALCULATION (BAZETT): 420 MS
EKG T AXIS: 87 DEGREES
EOSINOPHILS ABSOLUTE: 0.2 K/UL (ref 0–0.6)
EOSINOPHILS RELATIVE PERCENT: 4.8 % (ref 0–5)
GFR NON-AFRICAN AMERICAN: 10
GLUCOSE BLD-MCNC: 353 MG/DL (ref 70–99)
GLUCOSE BLD-MCNC: 376 MG/DL (ref 74–109)
HCT VFR BLD CALC: 30.3 % (ref 37–47)
HEMOGLOBIN: 9.2 G/DL (ref 12–16)
IMMATURE GRANULOCYTES #: 0 K/UL
LACTIC ACID: 1.8 MMOL/L (ref 0.5–1.9)
LYMPHOCYTES ABSOLUTE: 0.8 K/UL (ref 1.1–4.5)
LYMPHOCYTES RELATIVE PERCENT: 16.7 % (ref 20–40)
MCH RBC QN AUTO: 31.3 PG (ref 27–31)
MCHC RBC AUTO-ENTMCNC: 30.4 G/DL (ref 33–37)
MCV RBC AUTO: 103.1 FL (ref 81–99)
MONOCYTES ABSOLUTE: 0.5 K/UL (ref 0–0.9)
MONOCYTES RELATIVE PERCENT: 9.8 % (ref 0–10)
NEUTROPHILS ABSOLUTE: 3.2 K/UL (ref 1.5–7.5)
NEUTROPHILS RELATIVE PERCENT: 66.8 % (ref 50–65)
PDW BLD-RTO: 14.4 % (ref 11.5–14.5)
PERFORMED ON: ABNORMAL
PLATELET # BLD: 123 K/UL (ref 130–400)
PMV BLD AUTO: 10.5 FL (ref 9.4–12.3)
POTASSIUM SERPL-SCNC: 5 MMOL/L (ref 3.5–5)
RBC # BLD: 2.94 M/UL (ref 4.2–5.4)
SODIUM BLD-SCNC: 127 MMOL/L (ref 136–145)
TOTAL PROTEIN: 6.1 G/DL (ref 6.6–8.7)
WBC # BLD: 4.8 K/UL (ref 4.8–10.8)

## 2019-10-13 PROCEDURE — 6360000002 HC RX W HCPCS: Performed by: HOSPITALIST

## 2019-10-13 PROCEDURE — 2580000003 HC RX 258: Performed by: HOSPITALIST

## 2019-10-13 PROCEDURE — 36415 COLL VENOUS BLD VENIPUNCTURE: CPT

## 2019-10-13 PROCEDURE — 6370000000 HC RX 637 (ALT 250 FOR IP): Performed by: INTERNAL MEDICINE

## 2019-10-13 PROCEDURE — 83605 ASSAY OF LACTIC ACID: CPT

## 2019-10-13 PROCEDURE — 6370000000 HC RX 637 (ALT 250 FOR IP): Performed by: HOSPITALIST

## 2019-10-13 PROCEDURE — 93010 ELECTROCARDIOGRAM REPORT: CPT | Performed by: INTERNAL MEDICINE

## 2019-10-13 PROCEDURE — 80053 COMPREHEN METABOLIC PANEL: CPT

## 2019-10-13 PROCEDURE — 85025 COMPLETE CBC W/AUTO DIFF WBC: CPT

## 2019-10-13 PROCEDURE — 82948 REAGENT STRIP/BLOOD GLUCOSE: CPT

## 2019-10-13 RX ORDER — HYDROCHLOROTHIAZIDE 25 MG/1
25 TABLET ORAL DAILY
Qty: 30 TABLET | Refills: 3 | Status: ON HOLD | OUTPATIENT
Start: 2019-10-14 | End: 2020-10-26 | Stop reason: SDUPTHER

## 2019-10-13 RX ORDER — CLONIDINE HYDROCHLORIDE 0.3 MG/1
0.3 TABLET ORAL EVERY 8 HOURS SCHEDULED
Qty: 60 TABLET | Refills: 3 | Status: ON HOLD | OUTPATIENT
Start: 2019-10-13 | End: 2021-11-25 | Stop reason: HOSPADM

## 2019-10-13 RX ORDER — AMLODIPINE BESYLATE 10 MG/1
10 TABLET ORAL DAILY
Status: DISCONTINUED | OUTPATIENT
Start: 2019-10-13 | End: 2019-10-13 | Stop reason: HOSPADM

## 2019-10-13 RX ORDER — LEVETIRACETAM 500 MG/1
500 TABLET ORAL 2 TIMES DAILY
Qty: 60 TABLET | Refills: 3 | Status: SHIPPED | OUTPATIENT
Start: 2019-10-13 | End: 2020-08-08 | Stop reason: ALTCHOICE

## 2019-10-13 RX ADMIN — AMLODIPINE BESYLATE 10 MG: 10 TABLET ORAL at 09:16

## 2019-10-13 RX ADMIN — HYDROCHLOROTHIAZIDE 25 MG: 25 TABLET ORAL at 07:50

## 2019-10-13 RX ADMIN — DULOXETINE HYDROCHLORIDE 60 MG: 60 CAPSULE, DELAYED RELEASE ORAL at 07:50

## 2019-10-13 RX ADMIN — LEVETIRACETAM 500 MG: 500 TABLET, FILM COATED ORAL at 07:50

## 2019-10-13 RX ADMIN — INSULIN LISPRO 15 UNITS: 100 INJECTION, SOLUTION INTRAVENOUS; SUBCUTANEOUS at 07:50

## 2019-10-13 RX ADMIN — LEVOTHYROXINE SODIUM 150 MCG: 75 TABLET ORAL at 06:00

## 2019-10-13 RX ADMIN — SEVELAMER CARBONATE 800 MG: 800 TABLET, FILM COATED ORAL at 07:50

## 2019-10-13 RX ADMIN — LISINOPRIL 40 MG: 20 TABLET ORAL at 07:50

## 2019-10-13 RX ADMIN — Medication 10 ML: at 00:26

## 2019-10-13 RX ADMIN — METOPROLOL SUCCINATE 100 MG: 50 TABLET, EXTENDED RELEASE ORAL at 06:00

## 2019-10-13 RX ADMIN — ENOXAPARIN SODIUM 30 MG: 30 INJECTION SUBCUTANEOUS at 07:50

## 2019-10-13 RX ADMIN — ISOSORBIDE MONONITRATE 120 MG: 60 TABLET, EXTENDED RELEASE ORAL at 07:50

## 2019-10-13 RX ADMIN — HYDRALAZINE HYDROCHLORIDE 75 MG: 50 TABLET, FILM COATED ORAL at 05:39

## 2019-10-13 RX ADMIN — HYDRALAZINE HYDROCHLORIDE 10 MG: 20 INJECTION INTRAMUSCULAR; INTRAVENOUS at 00:25

## 2019-10-13 RX ADMIN — CLONIDINE HYDROCHLORIDE 0.3 MG: 0.2 TABLET ORAL at 05:38

## 2019-10-13 RX ADMIN — Medication 10 ML: at 07:53

## 2019-10-13 ASSESSMENT — PAIN SCALES - GENERAL: PAINLEVEL_OUTOF10: 0

## 2019-10-14 ENCOUNTER — TELEPHONE (OUTPATIENT)
Dept: NEUROLOGY | Age: 49
End: 2019-10-14

## 2019-10-15 LAB
BLOOD CULTURE, ROUTINE: NORMAL
CULTURE, BLOOD 2: NORMAL

## 2019-10-22 ENCOUNTER — HOSPITAL ENCOUNTER (OUTPATIENT)
Dept: WOMENS IMAGING | Age: 49
Discharge: HOME | End: 2019-10-22
Payer: MEDICARE

## 2019-10-22 DIAGNOSIS — Z12.31 ENCOUNTER FOR SCREENING MAMMOGRAM FOR MALIGNANT NEOPLASM OF BREAST: ICD-10-CM

## 2019-10-22 PROCEDURE — 77063 BREAST TOMOSYNTHESIS BI: CPT

## 2019-11-07 ENCOUNTER — OFFICE VISIT (OUTPATIENT)
Dept: NEUROLOGY | Age: 49
End: 2019-11-07
Payer: MEDICARE

## 2019-11-07 VITALS
WEIGHT: 140.4 LBS | BODY MASS INDEX: 22.66 KG/M2 | RESPIRATION RATE: 16 BRPM | HEART RATE: 68 BPM | SYSTOLIC BLOOD PRESSURE: 139 MMHG | DIASTOLIC BLOOD PRESSURE: 81 MMHG

## 2019-11-07 DIAGNOSIS — R56.9 SEIZURE (HCC): Primary | ICD-10-CM

## 2019-11-07 DIAGNOSIS — H53.483 BILATERAL VISUAL FIELD CONSTRICTION: ICD-10-CM

## 2019-11-07 PROCEDURE — 3017F COLORECTAL CA SCREEN DOC REV: CPT | Performed by: PSYCHIATRY & NEUROLOGY

## 2019-11-07 PROCEDURE — G8484 FLU IMMUNIZE NO ADMIN: HCPCS | Performed by: PSYCHIATRY & NEUROLOGY

## 2019-11-07 PROCEDURE — G8427 DOCREV CUR MEDS BY ELIG CLIN: HCPCS | Performed by: PSYCHIATRY & NEUROLOGY

## 2019-11-07 PROCEDURE — 4004F PT TOBACCO SCREEN RCVD TLK: CPT | Performed by: PSYCHIATRY & NEUROLOGY

## 2019-11-07 PROCEDURE — G8420 CALC BMI NORM PARAMETERS: HCPCS | Performed by: PSYCHIATRY & NEUROLOGY

## 2019-11-07 PROCEDURE — 1111F DSCHRG MED/CURRENT MED MERGE: CPT | Performed by: PSYCHIATRY & NEUROLOGY

## 2019-11-07 PROCEDURE — 99213 OFFICE O/P EST LOW 20 MIN: CPT | Performed by: PSYCHIATRY & NEUROLOGY

## 2020-03-02 ENCOUNTER — HOSPITAL ENCOUNTER (OUTPATIENT)
Dept: NON INVASIVE DIAGNOSTICS | Age: 50
Discharge: HOME | End: 2020-03-02
Payer: MEDICARE

## 2020-03-02 LAB
EKG P AXIS: 74 DEGREES
EKG P-R INTERVAL: 176 MS
EKG Q-T INTERVAL: 440 MS
EKG QRS DURATION: 90 MS
EKG QTC CALCULATION (BAZETT): 445 MS
EKG T AXIS: 57 DEGREES

## 2020-03-02 PROCEDURE — 93005 ELECTROCARDIOGRAM TRACING: CPT | Performed by: NURSE PRACTITIONER

## 2020-03-02 PROCEDURE — 93010 ELECTROCARDIOGRAM REPORT: CPT | Performed by: INTERNAL MEDICINE

## 2020-03-09 ENCOUNTER — TELEPHONE (OUTPATIENT)
Dept: VASCULAR SURGERY | Age: 50
End: 2020-03-09

## 2020-03-09 NOTE — TELEPHONE ENCOUNTER
I spoke with the Unit charge nurse and made her aware that we received their Fax about Sascha Roca having pain in her access arm during Tx. I spoke with GREGORIO [facility] about this and we decided to have her come into the office when Dr. Rubio Alcocer is here so he can see the patient. Nayeli's appt. spots are completely booked, so we have her under Stefania Martinez is aware of this and is Ok with putting the patient on her schedule so Dr. Pita Leyva can see her. I was unable to get a hold of the patient, she is due to be in dialysis tomorrow for treatment. I faxed an appt. Card to her dialysis clinic for them to give to her tomorrow.

## 2020-03-12 ENCOUNTER — OFFICE VISIT (OUTPATIENT)
Dept: VASCULAR SURGERY | Age: 50
End: 2020-03-12
Payer: MEDICARE

## 2020-03-12 VITALS
BODY MASS INDEX: 22.99 KG/M2 | WEIGHT: 143.08 LBS | SYSTOLIC BLOOD PRESSURE: 189 MMHG | HEART RATE: 68 BPM | OXYGEN SATURATION: 98 % | TEMPERATURE: 100.1 F | DIASTOLIC BLOOD PRESSURE: 76 MMHG | HEIGHT: 66 IN | RESPIRATION RATE: 16 BRPM

## 2020-03-12 PROCEDURE — G8427 DOCREV CUR MEDS BY ELIG CLIN: HCPCS | Performed by: PHYSICIAN ASSISTANT

## 2020-03-12 PROCEDURE — 99212 OFFICE O/P EST SF 10 MIN: CPT | Performed by: PHYSICIAN ASSISTANT

## 2020-03-12 PROCEDURE — 3017F COLORECTAL CA SCREEN DOC REV: CPT | Performed by: PHYSICIAN ASSISTANT

## 2020-03-12 PROCEDURE — 4004F PT TOBACCO SCREEN RCVD TLK: CPT | Performed by: PHYSICIAN ASSISTANT

## 2020-03-12 PROCEDURE — G8420 CALC BMI NORM PARAMETERS: HCPCS | Performed by: PHYSICIAN ASSISTANT

## 2020-03-12 PROCEDURE — G8484 FLU IMMUNIZE NO ADMIN: HCPCS | Performed by: PHYSICIAN ASSISTANT

## 2020-03-12 NOTE — PROGRESS NOTES
 Hyponatremia 03/08/2018    Acute respiratory failure with hypoxia and hypercapnia ([facility] 03/07/2018    Respiratory failure ([facility] 03/05/2018    Type 2 diabetes mellitus with chronic kidney disease on chronic dialysis, with long-term current use of insulin ([facility] 12/09/2017    Noncompliance of patient with renal dialysis ([facility]     Family history of colon cancer 10/20/2017    Unintentional weight loss 10/20/2017    Hyperkalemia 05/23/2017    Anemia in chronic kidney disease (CKD) 05/23/2017    Iron deficiency 05/23/2017    Gastroparesis     Gastroesophageal reflux disease without esophagitis     Acquired hypothyroidism     Chronic constipation 11/04/2016     Current Outpatient Medications   Medication Sig Dispense Refill    cloNIDine (CATAPRES) 0.3 MG tablet Take 1 tablet by mouth every 8 hours 60 tablet 3    hydrochlorothiazide (HYDRODIURIL) 25 MG tablet Take 1 tablet by mouth daily 30 tablet 3    sevelamer (RENVELA) 800 MG tablet Take 1 tablet by mouth 3 times daily (with meals)      lisinopril (PRINIVIL;ZESTRIL) 20 MG tablet Take 1 tablet by mouth 2 times daily 30 tablet 0    metoprolol succinate (TOPROL XL) 100 MG extended release tablet Take 1 tablet by mouth every morning (before breakfast) 30 tablet 0    hydrALAZINE (APRESOLINE) 50 MG tablet Take 1 tablet by mouth every 8 hours 90 tablet 0    insulin detemir (LEVEMIR) 100 UNIT/ML injection vial Inject 10 Units into the skin nightly Indications: Diabetes       albuterol sulfate  (90 Base) MCG/ACT inhaler Inhale 2 puffs into the lungs every 4 hours as needed      levothyroxine (SYNTHROID) 150 MCG tablet Take 150 mcg by mouth Daily      NIFEdipine (ADALAT CC) 60 MG extended release tablet Take 60 mg by mouth daily      isosorbide mononitrate (IMDUR) 120 MG extended release tablet Take 1 tablet by mouth daily 30 tablet 0    ondansetron (ZOFRAN) 4 MG tablet Take 4 mg by mouth every 8 hours as needed for Nausea or Vomiting      DULoxetine (CYMBALTA) 60 MG extended release capsule Take 60 mg by mouth daily      rOPINIRole (REQUIP) 2 MG tablet Take 2 mg by mouth nightly       simvastatin (ZOCOR) 40 MG tablet Take 40 mg by mouth nightly       insulin aspart (NOVOLOG FLEXPEN) 100 UNIT/ML injection pen Inject 5 Units into the skin 3 times daily       levETIRAcetam (KEPPRA) 500 MG tablet Take 1 tablet by mouth 2 times daily (Patient not taking: Reported on 3/12/2020) 60 tablet 3     No current facility-administered medications for this visit. Allergies: Penicillins;  Lamisil advanced [terbinafine]; and Stadol [butorphanol]  Past Medical History:   Diagnosis Date    Arthritis     Chronic kidney disease, stage 5, kidney failure (HCC)     Closed fracture of right shoulder girdle, with routine healing, subsequent encounter     DM (diabetes mellitus) type II controlled with renal manifestation ([address] 06/1993    Gastroparesis     GERD (gastroesophageal reflux disease)     Hemodialysis patient ([address]     dialysis on tues, thur, and sat at [facility] clinic    Hypertension     Hypothyroid     Nasal congestion     recent    Noncompliance with medications     Palliative care patient 10/08/2019    Restless leg syndrome      Past Surgical History:   Procedure Laterality Date    BREAST SURGERY      infected milk gland removed    CHOLECYSTECTOMY      COLONOSCOPY      DIALYSIS FISTULA CREATION Left 1/25/2019    REVISION LEFT UPPER EXTREMITY AV ACCESS WITH LEFT BRACHIAL ARTERY TO LEFT AXILLARY VEIN WITH  INTERPOSITIONAL ARTEGRAFT   performed by Jasper Miranda MD at [facility]  2012    GALLBLADDER SURGERY      HC DIALYSIS CATHETER Right 1/25/2019    INSERTION OF RIGHT INTERNAL JUGULAR HEMODIALYSIS CATHETER performed [facility] at [facility]      pt denies hyst; states ablation    OR COLONOSCOPY W/BIOPSY SINGLE/MULTIPLE N/A 10/20/2017    Dr Elan Agarwal prep-Tubular AP (-) dysplasia x 2--3 yr get nicotine patches but insurance wouldnt cover   Substance Use Topics    Alcohol use: No       Review of Systems    Constitutional - no significant activity change, appetite change, or unexpected weight change. No fever or chills. No diaphoresis or significant fatigue. HENT - no significant rhinorrhea or epistaxis. No tinnitus or significant hearing loss. Eyes - no sudden vision change or amaurosis. Respiratory - no significant shortness of breath, wheezing, or stridor. No apnea, cough, or chest tightness associated with shortness of breath. Cardiovascular - no chest pain, syncope, or significant dizziness. No palpitations or significant leg swelling. No claudication. (see HPI)  Gastrointestinal - no abdominal swelling or pain. No blood in stool. No severe constipation, diarrhea, nausea, or vomiting. Genitourinary - No dysuria, frequency, or urgency. No flank pain or hematuria. Musculoskeletal - no back pain, gait disturbance, or myalgia. Skin - no color change, rash, pallor, or new wound. Neurologic - no dizziness, facial asymmetry, or light headedness. No seizures. No speech difficulty or lateralizing weakness. Hematologic - no easy bruising or excessive bleeding. Psychiatric - no severe anxiety or nervousness. No confusion. All other review of systems are negative. Physical Exam    BP (!) 189/76 (Site: Right Upper Arm, Position: Sitting, Cuff Size: Medium Adult)   Pulse 68   Temp 100.1 °F (37.8 °C) (Temporal)   Resp 16   Ht 5' 6\" (1.676 m)   Wt 143 lb 1.3 oz (64.9 kg)   SpO2 98%   BMI 23.09 kg/m²     Constitutional - well developed, well nourished. No diaphoresis or acute distress. HENT - head normocephalic. Right external ear canal appears normal.  Left external ear canal appears normal.  Septum appears midline. Eyes - conjunctiva normal.  EOMS normal.  No exudate. No icterus. Neck- ROM appears normal, no tracheal deviation. Cardiovascular - Regular rate and rhythm.

## 2020-03-25 PROBLEM — E87.1 HYPONATREMIA: Status: RESOLVED | Noted: 2018-03-08 | Resolved: 2020-03-24

## 2020-03-25 PROBLEM — K31.84 GASTROPARESIS: Status: RESOLVED | Noted: 2020-03-25 | Resolved: 2020-03-24

## 2020-06-10 ENCOUNTER — VIRTUAL VISIT (OUTPATIENT)
Dept: VASCULAR SURGERY | Age: 50
End: 2020-06-10

## 2020-06-10 NOTE — PROGRESS NOTES
Judy Montgomery is a 45 y.o. female evaluated via telephone on 6/10/2020. Consent:  She and/or health care decision maker is aware that that she may receive a bill for this telephone service, depending on her insurance coverage, and has provided verbal consent to proceed: Yes      Documentation:  I communicated with the patient and/or health care decision maker about due to the covid-19 outbreak, we are trying to limit exposures to our patients whom have elective follow ups. We are calling each individual patient to make sure they are doing okay.         Judy Montgomery is a 45 y.o. female with the following history as recorded in [facility]:  Patient Active Problem List    Diagnosis Date Noted    Type 2 diabetes mellitus with hyperosmolar coma, with long-term current use of insulin ([address]     Seizure ([address] 10/08/2019    Palliative care patient 10/08/2019    Hyperglycemic hyperosmolar nonketotic coma ([address]     Accelerated hypertension 04/20/2019    Medically noncompliant 04/20/2019    Noncompliance with medications     Left homonymous hemianopsia     Acute intractable headache     Volume overload 03/27/2019    Hyponatremia 01/26/2019    Hyperglycemia due to type 2 diabetes mellitus ([address] 01/26/2019    Normocytic anemia 01/26/2019    ESRD on hemodialysis ([address] 01/25/2019    Steal syndrome as complication of dialysis access ([address] 12/19/2018    PVD (peripheral vascular disease) ([address]     High hepatic iron concentration determined by biopsy of liver 12/05/2018    ESRD (end stage renal disease) on dialysis ([address]     Abnormal CT of liver 08/15/2018    Other ascites 08/15/2018    Gastroparesis 08/08/2018    Nausea and vomiting 08/08/2018    Chronic constipation 08/08/2018    Uncontrolled type 2 diabetes mellitus with complication, with long-term current use of insulin ([address] 08/08/2018    Dialysis AV fistula malfunction ([address] 06/27/2018    Acute respiratory failure with hypoxia and hypercapnia advice provided: PMH, medications and allergies reviewed. Mrs. Palmira Lazaro is a 46 yo female who has a history of stage V CKD on HD. She has not had any problems with excessive bleeding during or post treatment, any problems with access flow rates, any problems with venous or arterial pressures, infiltrations, pain or swelling in access arm/hand. Assessment/Plan -      1. Stage V CKD requiring chronic dialysis      I will have [facility] check with her dialysis unit to make sure they are not having any problems with her AV access  Follow up PRN      I affirm this is a Patient Initiated Episode with a Patient who has not had a related appointment within my department in the past 7 days or scheduled within the next 24 hours.     Patient identification was verified at the start of the visit: Yes    Total Time: minutes: 5-10 minutes    Note: not billable if this call serves to triage the patient into an appointment for the relevant concern      Teresa Frey

## 2020-06-24 ENCOUNTER — HOSPITAL ENCOUNTER (INPATIENT)
Age: 50
LOS: 3 days | Discharge: HOME | DRG: 286 | End: 2020-06-27
Attending: EMERGENCY MEDICINE | Admitting: HOSPITALIST
Payer: MEDICARE

## 2020-06-24 ENCOUNTER — APPOINTMENT (OUTPATIENT)
Dept: GENERAL RADIOLOGY | Age: 50
DRG: 286 | End: 2020-06-24
Payer: MEDICARE

## 2020-06-24 PROBLEM — I16.1 HYPERTENSIVE EMERGENCY: Status: ACTIVE | Noted: 2020-06-24

## 2020-06-24 LAB
ALBUMIN SERPL-MCNC: 3.6 G/DL (ref 3.5–5.2)
ALP BLD-CCNC: 128 U/L (ref 35–104)
ALT SERPL-CCNC: 26 U/L (ref 5–33)
ANION GAP SERPL CALCULATED.3IONS-SCNC: 12 MMOL/L (ref 7–19)
APTT: 27.5 SEC (ref 26–36.2)
AST SERPL-CCNC: 30 U/L (ref 5–32)
BASOPHILS ABSOLUTE: 0.1 K/UL (ref 0–0.2)
BASOPHILS RELATIVE PERCENT: 1.8 % (ref 0–1)
BILIRUB SERPL-MCNC: 0.4 MG/DL (ref 0.2–1.2)
BUN BLDV-MCNC: 27 MG/DL (ref 6–20)
CALCIUM SERPL-MCNC: 8.6 MG/DL (ref 8.6–10)
CHLORIDE BLD-SCNC: 93 MMOL/L (ref 98–111)
CO2: 30 MMOL/L (ref 22–29)
CREAT SERPL-MCNC: 4.3 MG/DL (ref 0.5–0.9)
EOSINOPHILS ABSOLUTE: 0.1 K/UL (ref 0–0.6)
EOSINOPHILS RELATIVE PERCENT: 1.8 % (ref 0–5)
GFR NON-AFRICAN AMERICAN: 11
GLUCOSE BLD-MCNC: 174 MG/DL (ref 74–109)
HCT VFR BLD CALC: 31.7 % (ref 37–47)
HEMOGLOBIN: 10.2 G/DL (ref 12–16)
IMMATURE GRANULOCYTES #: 0 K/UL
INR BLD: 1.07 (ref 0.88–1.18)
LYMPHOCYTES ABSOLUTE: 1 K/UL (ref 1.1–4.5)
LYMPHOCYTES RELATIVE PERCENT: 19.6 % (ref 20–40)
MCH RBC QN AUTO: 31.2 PG (ref 27–31)
MCHC RBC AUTO-ENTMCNC: 32.2 G/DL (ref 33–37)
MCV RBC AUTO: 96.9 FL (ref 81–99)
MONOCYTES ABSOLUTE: 0.2 K/UL (ref 0–0.9)
MONOCYTES RELATIVE PERCENT: 4.5 % (ref 0–10)
NEUTROPHILS ABSOLUTE: 3.7 K/UL (ref 1.5–7.5)
NEUTROPHILS RELATIVE PERCENT: 71.9 % (ref 50–65)
PDW BLD-RTO: 13.3 % (ref 11.5–14.5)
PLATELET # BLD: 113 K/UL (ref 130–400)
PMV BLD AUTO: 10.3 FL (ref 9.4–12.3)
POTASSIUM SERPL-SCNC: 4.2 MMOL/L (ref 3.5–5)
PRO-BNP: ABNORMAL PG/ML (ref 0–900)
PROTHROMBIN TIME: 13.8 SEC (ref 12–14.6)
RBC # BLD: 3.27 M/UL (ref 4.2–5.4)
SODIUM BLD-SCNC: 135 MMOL/L (ref 136–145)
TOTAL PROTEIN: 6 G/DL (ref 6.6–8.7)
TROPONIN: 0.04 NG/ML (ref 0–0.03)
WBC # BLD: 5.1 K/UL (ref 4.8–10.8)

## 2020-06-24 PROCEDURE — 2100000000 HC CCU R&B

## 2020-06-24 PROCEDURE — 2500000003 HC RX 250 WO HCPCS: Performed by: EMERGENCY MEDICINE

## 2020-06-24 PROCEDURE — 93005 ELECTROCARDIOGRAM TRACING: CPT | Performed by: EMERGENCY MEDICINE

## 2020-06-24 PROCEDURE — 6360000002 HC RX W HCPCS: Performed by: EMERGENCY MEDICINE

## 2020-06-24 PROCEDURE — 99285 EMERGENCY DEPT VISIT HI MDM: CPT

## 2020-06-24 PROCEDURE — 71045 X-RAY EXAM CHEST 1 VIEW: CPT

## 2020-06-24 PROCEDURE — 96374 THER/PROPH/DIAG INJ IV PUSH: CPT

## 2020-06-24 RX ORDER — NITROGLYCERIN 20 MG/100ML
5 INJECTION INTRAVENOUS CONTINUOUS
Status: DISCONTINUED | OUTPATIENT
Start: 2020-06-24 | End: 2020-06-27 | Stop reason: HOSPADM

## 2020-06-24 RX ORDER — FUROSEMIDE 10 MG/ML
80 INJECTION INTRAMUSCULAR; INTRAVENOUS ONCE
Status: COMPLETED | OUTPATIENT
Start: 2020-06-24 | End: 2020-06-24

## 2020-06-24 RX ADMIN — FUROSEMIDE 80 MG: 10 INJECTION, SOLUTION INTRAMUSCULAR; INTRAVENOUS at 23:23

## 2020-06-24 RX ADMIN — NITROGLYCERIN 5 MCG/MIN: 20 INJECTION INTRAVENOUS at 23:24

## 2020-06-25 ENCOUNTER — TELEPHONE (OUTPATIENT)
Dept: CARDIOLOGY | Age: 50
End: 2020-06-25

## 2020-06-25 LAB
ANION GAP SERPL CALCULATED.3IONS-SCNC: 14 MMOL/L (ref 7–19)
BASE EXCESS ARTERIAL: 10.3 MMOL/L (ref -2–2)
BUN BLDV-MCNC: 29 MG/DL (ref 6–20)
CALCIUM SERPL-MCNC: 8.3 MG/DL (ref 8.6–10)
CARBOXYHEMOGLOBIN ARTERIAL: 3.5 % (ref 0–5)
CHLORIDE BLD-SCNC: 94 MMOL/L (ref 98–111)
CO2: 28 MMOL/L (ref 22–29)
CREAT SERPL-MCNC: 4.4 MG/DL (ref 0.5–0.9)
GFR NON-AFRICAN AMERICAN: 11
GLUCOSE BLD-MCNC: 100 MG/DL (ref 70–99)
GLUCOSE BLD-MCNC: 135 MG/DL (ref 70–99)
GLUCOSE BLD-MCNC: 149 MG/DL (ref 74–109)
GLUCOSE BLD-MCNC: 159 MG/DL (ref 70–99)
GLUCOSE BLD-MCNC: 255 MG/DL (ref 70–99)
HCO3 ARTERIAL: 33 MMOL/L (ref 22–26)
HEMOGLOBIN, ART, EXTENDED: 10 G/DL (ref 12–16)
LACTIC ACID: 0.2 MMOL/L (ref 0.5–1.9)
MAGNESIUM: 2.3 MG/DL (ref 1.6–2.6)
METHEMOGLOBIN ARTERIAL: 0.5 %
O2 CONTENT ARTERIAL: 12.5 ML/DL
O2 SAT, ARTERIAL: 88.5 %
O2 THERAPY: ABNORMAL
PCO2 ARTERIAL: 36 MMHG (ref 35–45)
PERFORMED ON: ABNORMAL
PH ARTERIAL: 7.57 (ref 7.35–7.45)
PHOSPHORUS: 4.3 MG/DL (ref 2.5–4.5)
PO2 ARTERIAL: 50 MMHG (ref 80–100)
POTASSIUM REFLEX MAGNESIUM: 3.9 MMOL/L (ref 3.5–5)
POTASSIUM, WHOLE BLOOD: 4.1
SARS-COV-2, NAAT: NOT DETECTED
SODIUM BLD-SCNC: 136 MMOL/L (ref 136–145)
T4 FREE: 1.67 NG/DL (ref 0.93–1.7)
TROPONIN: 0.03 NG/ML (ref 0–0.03)
TROPONIN: 0.04 NG/ML (ref 0–0.03)
TROPONIN: 0.04 NG/ML (ref 0–0.03)
TROPONIN: 0.05 NG/ML (ref 0–0.03)
TSH REFLEX FT4: 4.96 UIU/ML (ref 0.35–5.5)
VANCOMYCIN RANDOM: 7.9 UG/ML

## 2020-06-25 PROCEDURE — 2100000000 HC CCU R&B

## 2020-06-25 PROCEDURE — 84443 ASSAY THYROID STIM HORMONE: CPT

## 2020-06-25 PROCEDURE — 2580000003 HC RX 258: Performed by: HOSPITALIST

## 2020-06-25 PROCEDURE — 6360000002 HC RX W HCPCS: Performed by: HOSPITALIST

## 2020-06-25 PROCEDURE — 2580000003 HC RX 258: Performed by: EMERGENCY MEDICINE

## 2020-06-25 PROCEDURE — 36415 COLL VENOUS BLD VENIPUNCTURE: CPT

## 2020-06-25 PROCEDURE — 99221 1ST HOSP IP/OBS SF/LOW 40: CPT | Performed by: INTERNAL MEDICINE

## 2020-06-25 PROCEDURE — 94660 CPAP INITIATION&MGMT: CPT

## 2020-06-25 PROCEDURE — 84132 ASSAY OF SERUM POTASSIUM: CPT

## 2020-06-25 PROCEDURE — 6370000000 HC RX 637 (ALT 250 FOR IP): Performed by: HOSPITALIST

## 2020-06-25 PROCEDURE — 83605 ASSAY OF LACTIC ACID: CPT

## 2020-06-25 PROCEDURE — 2700000000 HC OXYGEN THERAPY PER DAY

## 2020-06-25 PROCEDURE — 85730 THROMBOPLASTIN TIME PARTIAL: CPT

## 2020-06-25 PROCEDURE — 84439 ASSAY OF FREE THYROXINE: CPT

## 2020-06-25 PROCEDURE — 6360000002 HC RX W HCPCS: Performed by: EMERGENCY MEDICINE

## 2020-06-25 PROCEDURE — 85025 COMPLETE CBC W/AUTO DIFF WBC: CPT

## 2020-06-25 PROCEDURE — 82803 BLOOD GASES ANY COMBINATION: CPT

## 2020-06-25 PROCEDURE — 85610 PROTHROMBIN TIME: CPT

## 2020-06-25 PROCEDURE — 83880 ASSAY OF NATRIURETIC PEPTIDE: CPT

## 2020-06-25 PROCEDURE — 2500000003 HC RX 250 WO HCPCS: Performed by: EMERGENCY MEDICINE

## 2020-06-25 PROCEDURE — 80202 ASSAY OF VANCOMYCIN: CPT

## 2020-06-25 PROCEDURE — 84100 ASSAY OF PHOSPHORUS: CPT

## 2020-06-25 PROCEDURE — 82947 ASSAY GLUCOSE BLOOD QUANT: CPT

## 2020-06-25 PROCEDURE — 8010000000 HC HEMODIALYSIS ACUTE INPT

## 2020-06-25 PROCEDURE — 87040 BLOOD CULTURE FOR BACTERIA: CPT

## 2020-06-25 PROCEDURE — 83735 ASSAY OF MAGNESIUM: CPT

## 2020-06-25 PROCEDURE — 5A1D70Z PERFORMANCE OF URINARY FILTRATION, INTERMITTENT, LESS THAN 6 HOURS PER DAY: ICD-10-PCS | Performed by: INTERNAL MEDICINE

## 2020-06-25 PROCEDURE — 80053 COMPREHEN METABOLIC PANEL: CPT

## 2020-06-25 PROCEDURE — 84484 ASSAY OF TROPONIN QUANT: CPT

## 2020-06-25 PROCEDURE — U0002 COVID-19 LAB TEST NON-CDC: HCPCS

## 2020-06-25 RX ORDER — CEFEPIME HYDROCHLORIDE 2 G/50ML
2 INJECTION, SOLUTION INTRAVENOUS ONCE
Status: DISCONTINUED | OUTPATIENT
Start: 2020-06-25 | End: 2020-06-25 | Stop reason: SDUPTHER

## 2020-06-25 RX ORDER — HYDRALAZINE HYDROCHLORIDE 50 MG/1
50 TABLET, FILM COATED ORAL EVERY 8 HOURS SCHEDULED
Status: DISCONTINUED | OUTPATIENT
Start: 2020-06-25 | End: 2020-06-26

## 2020-06-25 RX ORDER — HYDROCHLOROTHIAZIDE 25 MG/1
25 TABLET ORAL DAILY
Status: DISCONTINUED | OUTPATIENT
Start: 2020-06-25 | End: 2020-06-27 | Stop reason: HOSPADM

## 2020-06-25 RX ORDER — ROPINIROLE 2 MG/1
2 TABLET, FILM COATED ORAL NIGHTLY
Status: DISCONTINUED | OUTPATIENT
Start: 2020-06-25 | End: 2020-06-27 | Stop reason: HOSPADM

## 2020-06-25 RX ORDER — METOPROLOL SUCCINATE 50 MG/1
100 TABLET, EXTENDED RELEASE ORAL
Status: DISCONTINUED | OUTPATIENT
Start: 2020-06-25 | End: 2020-06-27 | Stop reason: HOSPADM

## 2020-06-25 RX ORDER — LISINOPRIL 20 MG/1
20 TABLET ORAL 2 TIMES DAILY
Status: DISCONTINUED | OUTPATIENT
Start: 2020-06-25 | End: 2020-06-27 | Stop reason: HOSPADM

## 2020-06-25 RX ORDER — ACETAMINOPHEN 650 MG/1
650 SUPPOSITORY RECTAL EVERY 6 HOURS PRN
Status: DISCONTINUED | OUTPATIENT
Start: 2020-06-25 | End: 2020-06-27 | Stop reason: HOSPADM

## 2020-06-25 RX ORDER — INSULIN GLARGINE 100 [IU]/ML
15 INJECTION, SOLUTION SUBCUTANEOUS NIGHTLY
Status: DISCONTINUED | OUTPATIENT
Start: 2020-06-25 | End: 2020-06-27 | Stop reason: HOSPADM

## 2020-06-25 RX ORDER — DULOXETIN HYDROCHLORIDE 60 MG/1
60 CAPSULE, DELAYED RELEASE ORAL DAILY
Status: DISCONTINUED | OUTPATIENT
Start: 2020-06-25 | End: 2020-06-27 | Stop reason: HOSPADM

## 2020-06-25 RX ORDER — HYDRALAZINE HYDROCHLORIDE 20 MG/ML
10 INJECTION INTRAMUSCULAR; INTRAVENOUS EVERY 4 HOURS PRN
Status: DISCONTINUED | OUTPATIENT
Start: 2020-06-25 | End: 2020-06-26

## 2020-06-25 RX ORDER — ATORVASTATIN CALCIUM 20 MG/1
20 TABLET, FILM COATED ORAL DAILY
Status: DISCONTINUED | OUTPATIENT
Start: 2020-06-25 | End: 2020-06-27 | Stop reason: HOSPADM

## 2020-06-25 RX ORDER — SEVELAMER CARBONATE 800 MG/1
800 TABLET, FILM COATED ORAL
Status: DISCONTINUED | OUTPATIENT
Start: 2020-06-25 | End: 2020-06-27 | Stop reason: HOSPADM

## 2020-06-25 RX ORDER — SODIUM CHLORIDE 0.9 % (FLUSH) 0.9 %
10 SYRINGE (ML) INJECTION EVERY 12 HOURS SCHEDULED
Status: DISCONTINUED | OUTPATIENT
Start: 2020-06-25 | End: 2020-06-26 | Stop reason: SDUPTHER

## 2020-06-25 RX ORDER — ONDANSETRON 2 MG/ML
4 INJECTION INTRAMUSCULAR; INTRAVENOUS EVERY 6 HOURS PRN
Status: DISCONTINUED | OUTPATIENT
Start: 2020-06-25 | End: 2020-06-27 | Stop reason: HOSPADM

## 2020-06-25 RX ORDER — NIFEDIPINE 60 MG/1
60 TABLET, EXTENDED RELEASE ORAL DAILY
Status: DISCONTINUED | OUTPATIENT
Start: 2020-06-25 | End: 2020-06-26

## 2020-06-25 RX ORDER — HEPARIN SODIUM 5000 [USP'U]/ML
5000 INJECTION, SOLUTION INTRAVENOUS; SUBCUTANEOUS EVERY 8 HOURS SCHEDULED
Status: DISCONTINUED | OUTPATIENT
Start: 2020-06-25 | End: 2020-06-27 | Stop reason: HOSPADM

## 2020-06-25 RX ORDER — LEVOTHYROXINE SODIUM 0.07 MG/1
150 TABLET ORAL DAILY
Status: DISCONTINUED | OUTPATIENT
Start: 2020-06-25 | End: 2020-06-27 | Stop reason: HOSPADM

## 2020-06-25 RX ORDER — ISOSORBIDE MONONITRATE 60 MG/1
120 TABLET, EXTENDED RELEASE ORAL DAILY
Status: DISCONTINUED | OUTPATIENT
Start: 2020-06-25 | End: 2020-06-27 | Stop reason: HOSPADM

## 2020-06-25 RX ORDER — ACETAMINOPHEN 325 MG/1
650 TABLET ORAL EVERY 6 HOURS PRN
Status: DISCONTINUED | OUTPATIENT
Start: 2020-06-25 | End: 2020-06-27 | Stop reason: HOSPADM

## 2020-06-25 RX ORDER — ROPINIROLE 2 MG/1
2 TABLET, FILM COATED ORAL ONCE
Status: COMPLETED | OUTPATIENT
Start: 2020-06-25 | End: 2020-06-25

## 2020-06-25 RX ORDER — ALBUTEROL SULFATE 2.5 MG/3ML
2.5 SOLUTION RESPIRATORY (INHALATION)
Status: DISCONTINUED | OUTPATIENT
Start: 2020-06-25 | End: 2020-06-27 | Stop reason: HOSPADM

## 2020-06-25 RX ORDER — SODIUM CHLORIDE 0.9 % (FLUSH) 0.9 %
10 SYRINGE (ML) INJECTION PRN
Status: DISCONTINUED | OUTPATIENT
Start: 2020-06-25 | End: 2020-06-26 | Stop reason: SDUPTHER

## 2020-06-25 RX ORDER — NICOTINE POLACRILEX 4 MG
15 LOZENGE BUCCAL PRN
Status: DISCONTINUED | OUTPATIENT
Start: 2020-06-25 | End: 2020-06-27 | Stop reason: HOSPADM

## 2020-06-25 RX ORDER — ONDANSETRON 4 MG/1
4 TABLET, ORALLY DISINTEGRATING ORAL EVERY 8 HOURS PRN
Status: DISCONTINUED | OUTPATIENT
Start: 2020-06-25 | End: 2020-06-27 | Stop reason: HOSPADM

## 2020-06-25 RX ADMIN — ACETAMINOPHEN 650 MG: 325 TABLET, FILM COATED ORAL at 05:48

## 2020-06-25 RX ADMIN — NITROGLYCERIN 25 MCG/MIN: 20 INJECTION INTRAVENOUS at 02:31

## 2020-06-25 RX ADMIN — SODIUM CHLORIDE, PRESERVATIVE FREE 10 ML: 5 INJECTION INTRAVENOUS at 09:34

## 2020-06-25 RX ADMIN — NITROGLYCERIN 15 MCG/MIN: 20 INJECTION INTRAVENOUS at 02:04

## 2020-06-25 RX ADMIN — HYDROCHLOROTHIAZIDE 25 MG: 25 TABLET ORAL at 09:33

## 2020-06-25 RX ADMIN — DULOXETINE 60 MG: 60 CAPSULE, DELAYED RELEASE ORAL at 09:32

## 2020-06-25 RX ADMIN — CLONIDINE HYDROCHLORIDE 0.3 MG: 0.1 TABLET ORAL at 17:18

## 2020-06-25 RX ADMIN — CEFEPIME HYDROCHLORIDE 2 G: 2 INJECTION, SOLUTION INTRAVENOUS at 02:57

## 2020-06-25 RX ADMIN — LISINOPRIL 20 MG: 20 TABLET ORAL at 09:33

## 2020-06-25 RX ADMIN — ONDANSETRON 4 MG: 2 INJECTION INTRAMUSCULAR; INTRAVENOUS at 02:27

## 2020-06-25 RX ADMIN — NITROGLYCERIN 20 MCG/MIN: 20 INJECTION INTRAVENOUS at 02:18

## 2020-06-25 RX ADMIN — CEFEPIME HYDROCHLORIDE 2 G: 2 INJECTION, POWDER, FOR SOLUTION INTRAVENOUS at 00:48

## 2020-06-25 RX ADMIN — NIFEDIPINE 60 MG: 60 TABLET, FILM COATED, EXTENDED RELEASE ORAL at 09:32

## 2020-06-25 RX ADMIN — NITROGLYCERIN 30 MCG/MIN: 20 INJECTION INTRAVENOUS at 02:55

## 2020-06-25 RX ADMIN — METOPROLOL SUCCINATE 100 MG: 50 TABLET, EXTENDED RELEASE ORAL at 09:32

## 2020-06-25 RX ADMIN — SEVELAMER CARBONATE 800 MG: 800 TABLET, FILM COATED ORAL at 09:43

## 2020-06-25 RX ADMIN — HYDRALAZINE HYDROCHLORIDE 10 MG: 20 INJECTION INTRAMUSCULAR; INTRAVENOUS at 23:47

## 2020-06-25 RX ADMIN — ROPINIROLE HYDROCHLORIDE 2 MG: 2 TABLET, FILM COATED ORAL at 05:48

## 2020-06-25 RX ADMIN — HYDRALAZINE HYDROCHLORIDE 50 MG: 50 TABLET, FILM COATED ORAL at 17:17

## 2020-06-25 RX ADMIN — Medication 1000 MG: at 01:16

## 2020-06-25 RX ADMIN — CLONIDINE HYDROCHLORIDE 0.3 MG: 0.1 TABLET ORAL at 22:09

## 2020-06-25 RX ADMIN — SEVELAMER CARBONATE 800 MG: 800 TABLET, FILM COATED ORAL at 17:19

## 2020-06-25 RX ADMIN — HEPARIN SODIUM 5000 UNITS: 5000 INJECTION INTRAVENOUS; SUBCUTANEOUS at 05:48

## 2020-06-25 RX ADMIN — HYDRALAZINE HYDROCHLORIDE 50 MG: 50 TABLET, FILM COATED ORAL at 22:09

## 2020-06-25 RX ADMIN — HEPARIN SODIUM 5000 UNITS: 5000 INJECTION INTRAVENOUS; SUBCUTANEOUS at 17:24

## 2020-06-25 RX ADMIN — ATORVASTATIN CALCIUM 20 MG: 20 TABLET, FILM COATED ORAL at 09:33

## 2020-06-25 RX ADMIN — CLONIDINE HYDROCHLORIDE 0.3 MG: 0.1 TABLET ORAL at 05:48

## 2020-06-25 RX ADMIN — ISOSORBIDE MONONITRATE 120 MG: 60 TABLET, EXTENDED RELEASE ORAL at 09:32

## 2020-06-25 RX ADMIN — HEPARIN SODIUM 5000 UNITS: 5000 INJECTION INTRAVENOUS; SUBCUTANEOUS at 22:09

## 2020-06-25 RX ADMIN — NITROGLYCERIN 35 MCG/MIN: 20 INJECTION INTRAVENOUS at 03:10

## 2020-06-25 RX ADMIN — HYDRALAZINE HYDROCHLORIDE 50 MG: 50 TABLET, FILM COATED ORAL at 05:47

## 2020-06-25 RX ADMIN — LISINOPRIL 20 MG: 20 TABLET ORAL at 20:23

## 2020-06-25 RX ADMIN — DEXTROSE MONOHYDRATE 1000 MG: 5 INJECTION, SOLUTION INTRAVENOUS at 20:23

## 2020-06-25 RX ADMIN — ROPINIROLE HYDROCHLORIDE 2 MG: 2 TABLET, FILM COATED ORAL at 20:23

## 2020-06-25 RX ADMIN — LEVOTHYROXINE SODIUM 150 MCG: 75 TABLET ORAL at 05:48

## 2020-06-25 RX ADMIN — SODIUM CHLORIDE, PRESERVATIVE FREE 10 ML: 5 INJECTION INTRAVENOUS at 20:24

## 2020-06-25 ASSESSMENT — ENCOUNTER SYMPTOMS
SHORTNESS OF BREATH: 1
COUGH: 0
SHORTNESS OF BREATH: 0
ABDOMINAL DISTENTION: 0
VOMITING: 1
WHEEZING: 0
DIARRHEA: 0
RHINORRHEA: 0
SORE THROAT: 0
BLOOD IN STOOL: 0
CONSTIPATION: 0
BACK PAIN: 0
EYE DISCHARGE: 0
ABDOMINAL PAIN: 0
NAUSEA: 1

## 2020-06-25 ASSESSMENT — PAIN SCALES - GENERAL: PAINLEVEL_OUTOF10: 9

## 2020-06-25 NOTE — PROGRESS NOTES
normal. No stridor. No respiratory distress. No wheezes. No rales. Abdominal: Soft. Bowel sounds are normal. No distension and no mass. There is no tenderness. There is no rebound and no guarding. Musculoskeletal: Normal range of motion. There is no edema or tenderness. Extremities: No edema  Neurological: Alert and oriented to person, place, and time. No cranial nerve deficit. Skin: Skin is warm and dry. No rash noted. Not diaphoretic. No erythema. No pallor. Psychiatric: Normal mood and affect.  Behavior is normal. Judgment and thought content normal.     Medications:      nitroGLYCERIN 35 mcg/min (06/25/20 0553)      sodium chloride flush  10 mL Intravenous 2 times per day    heparin (porcine)  5,000 Units Subcutaneous 3 times per day    vancomycin (VANCOCIN) intermittent dosing (placeholder)   Other RX Placeholder    cloNIDine  0.3 mg Oral 3 times per day    DULoxetine  60 mg Oral Daily    hydrALAZINE  50 mg Oral 3 times per day    hydroCHLOROthiazide  25 mg Oral Daily    insulin glargine  15 Units Subcutaneous Nightly    isosorbide mononitrate  120 mg Oral Daily    levothyroxine  150 mcg Oral Daily    lisinopril  20 mg Oral BID    metoprolol succinate  100 mg Oral QAM AC    NIFEdipine  60 mg Oral Daily    rOPINIRole  2 mg Oral Nightly    sevelamer  800 mg Oral TID WC    atorvastatin  20 mg Oral Daily    insulin lispro  0-6 Units Subcutaneous TID WC    insulin lispro  0-3 Units Subcutaneous Nightly     sodium chloride flush, acetaminophen **OR** acetaminophen, ondansetron **OR** ondansetron, glucose, glucagon (rDNA), albuterol  DIET CARDIAC;         Lab and other Data:     Recent Labs     06/24/20  2320   WBC 5.1   HGB 10.2*   *     Recent Labs     06/24/20  2320 06/24/20  2357 06/25/20  0618   *  --  136   K 4.2 4.1 3.9   CL 93*  --  94*   CO2 30*  --  28   BUN 27*  --  29*   CREATININE 4.3*  --  4.4*   GLUCOSE 174*  --  149*     Recent Labs     06/24/20 2320   AST 30 ALT 26   BILITOT 0.4   ALKPHOS 128*     Troponin T:   Recent Labs     06/24/20  2320 06/25/20  0223 06/25/20  0618   TROPONINI 0.04* 0.03 0.04*     Pro-BNP: No results for input(s): BNP in the last 72 hours. INR:   Recent Labs     06/24/20  2320   INR 1.07     ABGs:   Lab Results   Component Value Date    PHART 7.570 06/24/2020    PO2ART 50.0 06/24/2020    OQS2GRN 36.0 06/24/2020     UA:No results for input(s): NITRITE, COLORU, PHUR, LABCAST, WBCUA, RBCUA, MUCUS, TRICHOMONAS, YEAST, BACTERIA, CLARITYU, SPECGRAV, LEUKOCYTESUR, UROBILINOGEN, BILIRUBINUR, BLOODU, GLUCOSEU, AMORPHOUS in the last 72 hours. Invalid input(s): KETONESU    Imaging:   XR CHEST PORTABLE   Final Result   1. Bilateral infiltrates may represent pneumonia or pulmonary edema. There is minimal right pleural effusion. 2. Mild cardiomegaly. 3. Venous stents. Signed by Dr Abelino Kyle on 6/25/2020 7:08 AM        Micro:   Blood Culture, Routine   Date Value Ref Range Status   10/10/2019 No growth after 5 days of incubation.   Final   , No components found for: LABURINE  Patient Active Problem List    Diagnosis Date Noted    Hypertensive emergency 06/24/2020    Type 2 diabetes mellitus with hyperosmolar coma, with long-term current use of insulin ([address]     Seizure ([address] 10/08/2019    Palliative care patient 10/08/2019    Hyperglycemic hyperosmolar nonketotic coma ([address]     Accelerated hypertension 04/20/2019    Medically noncompliant 04/20/2019    Noncompliance with medications     Left homonymous hemianopsia     Acute intractable headache     Volume overload 03/27/2019    Hyponatremia 01/26/2019    Hyperglycemia due to type 2 diabetes mellitus ([address] 01/26/2019    Normocytic anemia 01/26/2019    ESRD on hemodialysis ([address] 01/25/2019    Steal syndrome as complication of dialysis access ([address] 12/19/2018    PVD (peripheral vascular disease) ([address]     High hepatic iron concentration determined by biopsy of liver 12/05/2018    ESRD (end stage renal disease) on dialysis ([address]     Abnormal CT of liver 08/15/2018    Other ascites 08/15/2018    Gastroparesis 08/08/2018    Nausea and vomiting 08/08/2018    Chronic constipation 08/08/2018    Uncontrolled type 2 diabetes mellitus with complication, with long-term current use of insulin ([address] 08/08/2018    Dialysis AV fistula malfunction ([address] 06/27/2018    Acute respiratory failure with hypoxia and hypercapnia (HCC) 03/07/2018    Respiratory failure ([address] 03/05/2018    Type 2 diabetes mellitus with chronic kidney disease on chronic dialysis, with long-term current use of insulin ([address] 12/09/2017    Noncompliance of patient with renal dialysis ([address]     Family history of colon cancer 10/20/2017    Unintentional weight loss 10/20/2017    Hyperkalemia 05/23/2017    Anemia in chronic kidney disease (CKD) 05/23/2017    Iron deficiency 05/23/2017    Gastroesophageal reflux disease without esophagitis     Acquired hypothyroidism        Assessment/plan:    Active Problems:    Hypertensive emergency    CKD on HD      Plan:   HTN Urgency:  Admit to [address] ICU under Hospitalist Team  Tele  Cardio consult in AM  Nitroglycerin GGT      CKD on HD  -nephro consulted for routine HD  DVT Prophylaxis: heparin       Billy Tomlin MD  Hospitalist Service  6/25/2020  8:22 AM

## 2020-06-25 NOTE — CONSULTS
Palliative Care:  Pt known to Palliative Care. She presents to ED with hypertensive urgency. She has ESRD and on dialysis TTS. She is currently receiving a treatment. Resting in bed, with oxygen on via n/c.   Brief visit and will follow up for ACP. Pt continue to live at home with her significant other. Pt is noncompliant with care and continues to smoke. Past Medical History:        Past Medical History:   Diagnosis Date    Arthritis     Chronic kidney disease, stage 5, kidney failure (HCC)     Closed fracture of right shoulder girdle, with routine healing, subsequent encounter     DM (diabetes mellitus) type II controlled with renal manifestation ([address] 06/1993    Gastroparesis     GERD (gastroesophageal reflux disease)     Hemod[facility] ([address]     dialysis on tues, thur, and sat at [facility] clinic    Hypertension     Hypothyroid     Nasal congestion     recent    Noncompliance with medications     Palliative care patient 10/08/2019    Restless leg syndrome        Advance Directives:    Full code  Encouraged pt to complete living will document last admission, she says she is interested in reviewing them again. Plan:    Continue medical treatment and monitoring    Meet with pt and family to review goals of care.           Electronically signed by Marah Chavez RN on 6/25/2020 at 2:00 PM

## 2020-06-25 NOTE — PROGRESS NOTES
6/25/2020 12:09 AM - Cindy Cardona Incoming Lab Results From [employer] Value Ref Range & Units Status Collected Lab   pH, Arterial 7.570High Panic   7.350 - 7.450 Final 06/24/2020 11:57 PM [facility] Lab   pCO2, Arterial 36.0  35.0 - 45.0 mmHg Final 06/24/2020 11:57 PM [facility] Lab   HCO3, Arterial 33. 0High   22.0 - 26.0 mmol/L Final 06/24/2020 11:57 PM [facility] Excess, Arterial 10. 3High   -2.0 - 2.0 mmol/L Final 06/24/2020 11:57 PM [facility]   Hemoglobin, Art, Extended 10. 0Low   12.0 - 16.0 g/dL Final 06/24/2020 11:57 PM [facility]   O2 Sat, Arterial 88.5Low   >92 % Final 06/24/2020 11:57 PM [facility] Lab   Carboxyhgb, Arterial 3.5  0.0 - 5.0 % Final 06/24/2020 11:57 PM [facility] Lab        0.0-1.5   (Smokers 1.5-5.0)    Methemoglobin, Arterial 0.5  <1.5 % Final 06/24/2020 11:57 PM [facility] Lab   O2 Content, Arterial 12.5  Not Established mL/dL Final 06/24/2020 11:57 PM [facility]     Pt on room air  resp rate 30  Right radial puncture AT+

## 2020-06-25 NOTE — PROGRESS NOTES
Destiney Brewer arrived to room # [phone]. Presented with: hypertensive crisis  Mental Status: Patient is oriented, alert, coherent, logical, thought processes intact and able to concentrate and follow conversation. Vitals:    06/25/20 0136   BP: (!) 180/54   Pulse:    Resp:    Temp:    SpO2:      Patient safety contract and falls prevention contract reviewed with patient Yes. Oriented Patient to room. Call light within reach. Yes.   Needs, issues or concerns expressed at this time: no.      Electronically signed by Sobeida Godfrey RN on 6/25/2020 at 2:12 AM

## 2020-06-25 NOTE — ED PROVIDER NOTES
Gastroparesis     GERD (gastroesophageal reflux disease)     Hemodialysis patient ([address]     dialysis on tues, thur, and sat at [facility]    Hypertension     Hypothyroid     Nasal congestion     recent    Noncompliance with medications     Palliative care patient 10/08/2019    Restless leg syndrome          SURGICAL HISTORY       Past Surgical History:   Procedure Laterality Date    BREAST SURGERY      infected milk gland removed, 2004    CHOLECYSTECTOMY, LAPAROSCOPIC      2008    DIALYSIS FISTULA CREATION Left 1/25/2019    REVISION LEFT UPPER EXTREMITY AV ACCESS WITH LEFT BRACHIAL ARTERY TO LEFT AXILLARY VEIN WITH  INTERPOSITIONAL ARTEGRAFT   performed by Altagracia Huffman MD at [address]  2012    [address] Right 1/25/2019    INSERTION OF RIGHT INTERNAL JUGULAR HEMODIALYSIS CATHETER performed by Altagracia Huffamn MD at [facility]      pt denies hyst; states ablation    RI COLONOSCOPY W/BIOPSY SINGLE/MULTIPLE N/A 10/20/2017    Dr Frantz Baird prep-Tubular AP (-) dysplasia x 2--3 yr recall    RI EGD TRANSORAL BIOPSY SINGLE/MULTIPLE N/A 12/27/2016    Dr Stone Coello EGD TRANSORAL BIOPSY SINGLE/MULTIPLE N/A 10/20/2017    Dr Virginia Nicole (-)    TUBAL LIGATION      1993    VASCULAR SURGERY Left 2016    fistula by Dr Fred Kearns at [address]  10/02/2017    SJS. Left upper fistulograms/venograms, balloon angioplasty cephalic vein arch [id number] conquest.    VASCULAR SURGERY  08/2018    FISTULOGRAM    VASCULAR SURGERY  10/29/2018    SJS. Left upper fistulograms/venograms    VASCULAR SURGERY  12/19/2018    SJS. Arch aortogram,left upper arteriograms.  VASCULAR SURGERY  03/06/2019    SJS. Removal of tunneled dialyis catheter right internal jugular vein.  VASCULAR SURGERY  08/28/2019    SJS. Left upper extremity fistulogram including venography to the superior vena cava. Balloon angioplasty left subclavian vein with 10mm x 40mm conquest simvastatin (ZOCOR) 40 MG tablet Take 40 mg by mouth nightly       insulin aspart (NOVOLOG FLEXPEN) 100 UNIT/ML injection pen Inject 5 Units into the skin 3 times daily       levETIRAcetam (KEPPRA) 500 MG tablet Take 1 tablet by mouth 2 times daily  Qty: 60 tablet, Refills: 3             ALLERGIES     Penicillins;  Lamisil advanced [terbinafine]; and Stadol [butorphanol]    FAMILY HISTORY       Family History   Problem Relation Age of Onset    Colon Cancer Mother          at 63    Colon Polyps Mother     Lung Cancer Father          at 66    Colon Polyps Father     Stomach Cancer Sister     Breast Cancer Sister     Depression Daughter 25        committed suicide    Liver Cancer Neg Hx     Liver Disease Neg Hx     Esophageal Cancer Neg Hx     Rectal Cancer Neg Hx           SOCIAL HISTORY       Social History     Socioeconomic History    Marital status: Single     Spouse name: None    Number of children: 2    Years of education: None    Highest education level: None   Occupational History    None   Social Needs    Financial resource strain: None    Food insecurity     Worry: None     Inability: None    Transportation needs     Medical: None     Non-medical: None   Tobacco Use    Smoking status: Current Every Day Smoker     Packs/day: 2.00     Years: 33.00     Pack years: 66.00     Types: Cigarettes    Smokeless tobacco: Never Used    Tobacco comment: NOW at 1/4 PD, started at age 24 and has averaged 2 PPD   Substance and Sexual Activity    Alcohol use: No    Drug use: Not Currently     Types: Marijuana    Sexual activity: Not Currently     Partners: Male   Lifestyle    Physical activity     Days per week: None     Minutes per session: None    Stress: None   Relationships    Social connections     Talks on phone: None     Gets together: None     Attends Mandaeism service: None     Active member of club or organization: None     Attends meetings of clubs or organizations: None Relationship status: None    Intimate partner violence     Fear of current or ex partner: None     Emotionally abused: None     Physically abused: None     Forced sexual activity: None   Other Topics Concern    None   Social History Narrative    None       SCREENINGS    Yousuf Coma Scale  Eye Opening: Spontaneous  Best Verbal Response: Oriented  Best Motor Response: Obeys commands  Yousuf Coma Scale Score: 15        PHYSICAL EXAM    (up to 7 for level 4, 8 or more for level 5)     ED Triage Vitals [06/24/20 2317]   BP Temp Temp Source Pulse Resp SpO2 Height Weight   (!) 221/71 98.7 °F (37.1 °C) Oral 70 (!) 34 (!) 86 % 5' 6\" (1.676 m) 161 lb 6 oz (73.2 kg)       Physical Exam  Vitals signs and nursing note reviewed. Constitutional:       General: She is not in acute distress. Appearance: She is well-developed. She is not diaphoretic. HENT:      Head: Normocephalic and atraumatic. Eyes:      Pupils: Pupils are equal, round, and reactive to light. Neck:      Musculoskeletal: Normal range of motion and neck supple. Comments: JVD  Cardiovascular:      Rate and Rhythm: Normal rate and regular rhythm. Heart sounds: Normal heart sounds. Pulmonary:      Effort: Pulmonary effort is normal. Tachypnea present. No respiratory distress. Breath sounds: Rales present. Abdominal:      General: Bowel sounds are normal. There is no distension. Palpations: Abdomen is soft. Tenderness: There is no abdominal tenderness. Musculoskeletal: Normal range of motion. Right lower leg: Edema present. Left lower leg: Edema present. Comments: 3+ pitting edema bilaterally   Skin:     General: Skin is warm and dry. Findings: No rash. Neurological:      Mental Status: She is alert and oriented to person, place, and time. Cranial Nerves: No cranial nerve deficit. Motor: No abnormal muscle tone.       Coordination: Coordination normal.   Psychiatric:         Behavior: Arterial 7.570 (*)     pO2, Arterial 50.0 (*)     HCO3, Arterial 33.0 (*)     Base Excess, Arterial 10.3 (*)     Hemoglobin, Art, Extended 10.0 (*)     O2 Sat, Arterial 88.5 (*)     All other components within normal limits    Narrative:     CALL  [place]  Zahra Gastelum RN ER, 06/25/2020 03:52, by Isabel Branham RN ER, 06/25/2020 00:00, by Marianne Berad   CULTURE, BLOOD 1   CULTURE, BLOOD 2   APTT   PROTIME-INR   MAGNESIUM   PHOSPHORUS   TSH WITH REFLEX TO FT4   POTASSIUM, WHOLE BLOOD    Narrative:     CALL  [place]  Zahra Gastelum RN ER, 06/25/2020 00:00, by Marianne Beard   COVID-19   TROPONIN   T4, FREE   BLOOD GAS, ARTERIAL   POTASSIUM, WHOLE BLOOD   POTASSIUM, WHOLE BLOOD   POTASSIUM, WHOLE BLOOD   BASIC METABOLIC PANEL W/ REFLEX TO MG FOR LOW K   TROPONIN   POCT GLUCOSE   POCT GLUCOSE   POCT GLUCOSE       All other labs were within normal range or not returned as of this dictation. EMERGENCY DEPARTMENT COURSE and DIFFERENTIALDIAGNOSIS/MDM:   Vitals:    Vitals:    06/25/20 0117 06/25/20 0136 06/25/20 0230 06/25/20 0235   BP: (!) 195/80 (!) 180/54     Pulse: 67  66    Resp: 21   18   Temp:       TempSrc:       SpO2: 97%      Weight:       Height:           MDM   systolic, pulm congestion on CXR. ? RLL PNA. Started nitro gtt, lasix. Dose of broad spectrum abx. Placed on bipap    D/w Dr Heather Reese for admission    CONSULTS:  [place] 81 TO NEPHROLOGY  IP CONSULT TO CARDIOLOGY    PROCEDURES:  Unless otherwise notedbelow, none     Procedures    FINAL IMPRESSION     1. Hypertensive emergency    2. Acute respiratory failure with hypoxia (HCC)    3. Acute pulmonary edema ([address]          DISPOSITION/PLAN   DISPOSITION Decision To Admit 06/25/2020 12:06:00 AM      PATIENT REFERRED TO:  @FUP@    DISCHARGE MEDICATIONS:  Current Discharge Medication List            CRITICAL CARE TIME   Total Critical Care time was 35 minutes, excluding separately reportable procedures.  Time includes direct

## 2020-06-25 NOTE — CONSULTS
Rayna Muniz MD        ondansetron (ZOFRAN-ODT) disintegrating tablet 4 mg  4 mg Oral Q8H PRN Manas Zamarripa MD        Or    [facility]ron [facility]) injection 4 mg  4 mg Intravenous Q6H PRN Manas Zamarripa MD   4 mg at 06/25/20 0227    heparin (porcine) injection 5,000 Units  5,000 Units Subcutaneous 3 times per day Manas Zamarripa MD   5,000 Units at 06/25/20 0548    vancomycin (VANCOCIN) intermittent dosing (placeholder)   Other RX Placeholder Sandi Stovall MD        cloNIDine (CATAPRES) tablet 0.3 mg  0.3 mg Oral 3 times per day Manas Zamarripa MD   0.3 mg at 06/25/20 0548    DULoxetine (CYMBALTA) extended release capsule 60 mg  60 mg Oral Daily Manas Zamarripa MD   60 mg at 06/25/20 0932    hydrALAZINE (APRESOLINE) tablet 50 mg  50 mg Oral 3 times per day Manas Zamarripa MD   50 mg at 06/25/20 0547    hydroCHLOROthiazide (HYDRODIURIL) tablet 25 mg  25 mg Oral Daily Manas Zamarripa MD   25 mg at 06/25/20 0933    insulin glargine (LANTUS) injection vial 15 Units  15 Units Subcutaneous Nightly Manas Zamarripa MD        isosorbide mononitrate (IMDUR) extended release tablet 120 mg  120 mg Oral Daily Manas Zamarripa MD   120 mg at 06/25/20 0932    levothyroxine (SYNTHROID) tablet 150 mcg  150 mcg Oral Daily Manas Zamarripa MD   150 mcg at 06/25/20 0548    lisinopril (PRINIVIL;ZESTRIL) tablet 20 mg  20 mg Oral BID Manas Zamarripa MD   20 mg at 06/25/20 0933    metoprolol succinate (TOPROL XL) extended release tablet 100 mg  100 mg Oral QAM AC Manas Zamarripa MD   100 mg at 06/25/20 0932    NIFEdipine (PROCARDIA XL) extended release tablet 60 mg  60 mg Oral Daily Manas Zamarripa MD   60 mg at 06/25/20 0932    rOPINIRole (REQUIP) tablet 2 mg  2 mg Oral Nightly Manas Zamarripa MD        sevelamer (RENVELA) tablet 800 mg  800 mg Oral TID  Manas Zamarripa MD   800 mg at 06/25/20 0943    atorvastatin (LIPITOR) tablet 20 mg  20 mg Oral Daily Manas Zamarripa MD   20 mg at 06/25/20 0933    insulin lispro (HUMALOG) injection vial 0-6 Units  0-6 Units Subcutaneous TID  Xochitl Bennett MD        insulin lispro (HUMALOG) injection vial 0-3 Units  0-3 Units Subcutaneous Nightly Xochitl Bennett MD        glucose (GLUTOSE) 40 % oral gel 15 g  15 g Oral PRN Xochitl Bennett MD        glucagon (rDNA) injection 1 mg  1 mg Intramuscular PRN Xochitl Bennett MD        albuterol (PROVENTIL) nebulizer solution 2.5 mg  2.5 mg Nebulization Q1H PRN Xochitl Bennett MD        nitroGLYCERIN 50 mg in dextrose 5% 250 mL infusion  5 mcg/min Intravenous Continuous Angeline Ansari MD 10.5 mL/hr at 06/25/20 0553 35 mcg/min at 06/25/20 0553       Past Medical History:  Past Medical History:   Diagnosis Date    Arthritis     Chronic kidney disease, stage 5, [facility] failure [address]     Closed fracture of right shoulder girdle, with routine healing, subsequent encounter     DM (diabetes mellitus) type II controlled with renal manifestation ([address] 06/1993    Gastroparesis     GERD (gastroesophageal reflux disease)     Hemodialysis patient ([address]     dialysis on tues, thur, and sat at [facility]    Hypertension     Hypothyroid     Nasal congestion     recent    Noncompliance with medications     Palliative care patient 10/08/2019    Restless leg syndrome        Past Surgical History:  Past Surgical History:   Procedure Laterality Date    BREAST SURGERY      infected milk gland removed, 2004    CHOLECYSTECTOMY, LAPAROSCOPIC      2008    DIALYSIS FISTULA CREATION Left 1/25/2019    REVISION LEFT UPPER EXTREMITY AV ACCESS WITH LEFT BRACHIAL ARTERY TO LEFT AXILLARY VEIN WITH  INTERPOSITIONAL ARTEGRAFT   performed by Myla Alvarez MD at [facility]  2012    [address] Right 1/25/2019    INSERTION OF RIGHT INTERNAL JUGULAR HEMODIALYSIS CATHETER performed by Myla Alvarez MD at [facility]      pt denies hyst; states ablation    [place] COLONOSCOPY W/BIOPSY SINGLE/MULTIPLE N/A 10/20/2017    Dr Lynda Shi prep-Tubular AP (-) dysplasia x 2--3 yr recall    [place] EGD TRANSORAL BIOPSY SINGLE/MULTIPLE N/A 2016    Dr Lorenzo Sizer EGD TRANSORAL BIOPSY SINGLE/MULTIPLE N/A 10/20/2017    Dr Jordan Eagle (-)    TUBAL LIGATION          VASCULAR SURGERY Left 2016    fistula by Dr Rowe People at [address]  10/02/2017    SJS. Left upper fistulograms/venograms, balloon angioplasty cephalic vein arch [id number] conquest.    VASCULAR SURGERY  2018    FISTULOGRAM    VASCULAR SURGERY  10/29/2018    SJS. Left upper fistulograms/venograms    VASCULAR SURGERY  2018    SJS. Arch aortogram,left upper arteriograms.  VASCULAR SURGERY  2019    SJS. Removal of tunneled dialyis catheter right internal jugular vein.  VASCULAR SURGERY  2019    SJS. Left upper extremity fistulogram including venography to the superior vena cava. Balloon angioplasty left subclavian vein with 10mm x 40mm conquest balloon. Balloon angioplasty mid/proximal upper arm cephalic vein with 67BK x 40mm conquest balloon. Venograms after balloon angioplasty. Stent left mid/proximal upper arm cephalic vein stenosis fluency 10mm x 60mm self-expanding covered stent. Balloon     VASCULAR SURGERY  2019    cont angioplasty stent with 10mm x 40mm conquest balloon. Completion venograms left upper extremity.        Family History  Family History   Problem Relation Age of Onset    Colon Cancer Mother          at 63    Colon Polyps Mother    Angel Romero Father          at 66    Colon Polyps Father     Stomach Cancer Sister     Breast Cancer Sister     Depression Daughter 25        committed suicide    Liver Cancer Neg Hx     Liver Disease Neg Hx     Esophageal Cancer Neg Hx     Rectal Cancer Neg Hx        Social History  Social History     Socioeconomic History    Marital status: Single     Spouse name: Not on file    Number of children: 2    Years of education: Not on file    Highest education level: Not on file   Occupational History    Not on file   Social Needs    Financial resource strain: Not on file    Food insecurity     Worry: Not on file     Inability: Not on file    Transportation needs     Medical: Not on file     Non-medical: Not on file   Tobacco Use    Smoking status: Current Every Day Smoker     Packs/day: 2.00     Years: 33.00     Pack years: 66.00     Types: Cigarettes    Smokeless tobacco: Never Used    Tobacco comment: NOW at 1/4 PD, started at age 24 and has averaged 2 PPD   Substance and Sexual Activity    Alcohol use: No    Drug use: Not Currently     Types: Marijuana    Sexual activity: Not Currently     Partners: Male   Lifestyle    Physical activity     Days per week: Not on file     Minutes per session: Not on file    Stress: Not on file   Relationships    Social connections     Talks on phone: Not on file     Gets together: Not on file     Attends [employer] service: Not on file     Active member of club or organization: Not on file     Attends meetings of clubs or organizations: Not on file     Relationship status: Not on file    Intimate partner violence     Fear of current or ex partner: Not on file     Emotionally abused: Not on file     Physically abused: Not on file     Forced sexual activity: Not on file   Other Topics Concern    Not on file   Social History Narrative    Not on file         Review of Systems:  History obtained from chart review and the patient  General ROS: No fever or chills  Respiratory ROS: positive for - shortness of breath  Cardiovascular ROS: No chest pain or palpitations  Gastrointestinal ROS: No abdominal pain or melena  Genito-Urinary ROS: No dysuria or hematuria  Musculoskeletal ROS: No joint pain or swelling   14 point ROS reviewed with the patient and negative except as noted above and in the HPI unless unable to obtain.     Objective:  Patient Vitals for the past 24 hrs:   BP Temp Temp src Pulse Resp SpO2 Height Weight   06/25/20 0932 (!) 154/71 -- -- 61 -- -- 27.5     BNP:  No results for input(s): BNP in the last 72 hours. Ionized Calcium:No results for input(s): IONCA in the last 72 hours. Magnesium:  Recent Labs     06/24/20  2320   MG 2.3     Phosphorus:  Recent Labs     06/24/20  2320   PHOS 4.3     HgbA1C: No results for input(s): LABA1C in the last 72 hours. Hepatic:   Recent Labs     06/24/20  2320   ALKPHOS 128*   ALT 26   AST 30   PROT 6.0*   BILITOT 0.4   LABALBU 3.6     Lactic Acid:   Recent Labs     06/24/20  2320   LACTA 0.2*     Troponin: No results for input(s): CKTOTAL, CKMB, TROPONINT in the last 72 hours. ABGs: No results for input(s): PH, PCO2, PO2, HCO3, O2SAT in the last 72 hours. CRP:  No results for input(s): CRP in the last 72 hours. Sed Rate:  No results for input(s): SEDRATE in the last 72 hours. Cultures:   No results for input(s): CULTURE in the last 72 hours. No results for input(s): BC, Torres Severin in the last 72 hours. No results for input(s): CXSURG in the last 72 hours. Radiology reports as per the Radiologist  Radiology: Xr Chest Portable    Result Date: 6/25/2020  EXAMINATION:  XR CHEST PORTABLE  6/25/2020 7:07 AM HISTORY: Shortness of air. COMPARISON: 7/8/2019. FINDINGS:  There are bilateral infiltrates in the mid and lower lung zones. There is mild blunting of the right costophrenic angle. There is mild cardiomegaly. There is a left subclavian and axillary vein venous stent. There is another partially imaged venous stent in the left arm. No acute bony abnormality is seen. 1. Bilateral infiltrates may represent pneumonia or pulmonary edema. There is minimal right pleural effusion. 2. Mild cardiomegaly. 3. Venous stents. Signed by Dr Diana Vieira on 6/25/2020 7:08 AM       Assessment   1. End-stage renal disease on maintenance dialysis. 2.  Type II diabetic nephropathy. 3.  Clinically volume overload/pulmonary edema. 4.  Noncompliant with fluid restriction. 5.  Anemia of chronic kidney disease.   6.  Secondary

## 2020-06-26 LAB
ALBUMIN SERPL-MCNC: 3.2 G/DL (ref 3.5–5.2)
ALP BLD-CCNC: 107 U/L (ref 35–104)
ALT SERPL-CCNC: 17 U/L (ref 5–33)
ANION GAP SERPL CALCULATED.3IONS-SCNC: 11 MMOL/L (ref 7–19)
AST SERPL-CCNC: 17 U/L (ref 5–32)
BASOPHILS ABSOLUTE: 0.1 K/UL (ref 0–0.2)
BASOPHILS RELATIVE PERCENT: 2.4 % (ref 0–1)
BILIRUB SERPL-MCNC: 0.3 MG/DL (ref 0.2–1.2)
BUN BLDV-MCNC: 19 MG/DL (ref 6–20)
CALCIUM SERPL-MCNC: 8.4 MG/DL (ref 8.6–10)
CHLORIDE BLD-SCNC: 92 MMOL/L (ref 98–111)
CO2: 31 MMOL/L (ref 22–29)
CREAT SERPL-MCNC: 3.6 MG/DL (ref 0.5–0.9)
EKG P AXIS: 68 DEGREES
EKG P-R INTERVAL: 172 MS
EKG Q-T INTERVAL: 428 MS
EKG QRS DURATION: 88 MS
EKG QTC CALCULATION (BAZETT): 440 MS
EKG T AXIS: 90 DEGREES
EOSINOPHILS ABSOLUTE: 0.2 K/UL (ref 0–0.6)
EOSINOPHILS RELATIVE PERCENT: 5.7 % (ref 0–5)
GFR NON-AFRICAN AMERICAN: 13
GLUCOSE BLD-MCNC: 154 MG/DL (ref 70–99)
GLUCOSE BLD-MCNC: 259 MG/DL (ref 70–99)
GLUCOSE BLD-MCNC: 299 MG/DL (ref 74–109)
GLUCOSE BLD-MCNC: 311 MG/DL (ref 70–99)
HCT VFR BLD CALC: 29.2 % (ref 37–47)
HEMOGLOBIN: 9.5 G/DL (ref 12–16)
HOURS COLLECTED: NORMAL
IMMATURE GRANULOCYTES #: 0 K/UL
LYMPHOCYTES ABSOLUTE: 0.9 K/UL (ref 1.1–4.5)
LYMPHOCYTES RELATIVE PERCENT: 24.7 % (ref 20–40)
MCH RBC QN AUTO: 31.9 PG (ref 27–31)
MCHC RBC AUTO-ENTMCNC: 32.5 G/DL (ref 33–37)
MCV RBC AUTO: 98 FL (ref 81–99)
MONOCYTES ABSOLUTE: 0.3 K/UL (ref 0–0.9)
MONOCYTES RELATIVE PERCENT: 8.2 % (ref 0–10)
NEUTROPHILS ABSOLUTE: 2.2 K/UL (ref 1.5–7.5)
NEUTROPHILS RELATIVE PERCENT: 58.7 % (ref 50–65)
PDW BLD-RTO: 13.2 % (ref 11.5–14.5)
PERFORMED ON: ABNORMAL
PLATELET # BLD: 94 K/UL (ref 130–400)
PMV BLD AUTO: 10.9 FL (ref 9.4–12.3)
POTASSIUM REFLEX MAGNESIUM: 3.8 MMOL/L (ref 3.5–5)
POTASSIUM SERPL-SCNC: 3.8 MMOL/L (ref 3.5–5)
RBC # BLD: 2.98 M/UL (ref 4.2–5.4)
SODIUM BLD-SCNC: 134 MMOL/L (ref 136–145)
TOTAL PROTEIN: 5.4 G/DL (ref 6.6–8.7)
URINE TOTAL VOLUME: NORMAL
VANCOMYCIN RANDOM: 12.6 UG/ML
WBC # BLD: 3.7 K/UL (ref 4.8–10.8)

## 2020-06-26 PROCEDURE — 75625 CONTRAST EXAM ABDOMINL AORTA: CPT

## 2020-06-26 PROCEDURE — 85025 COMPLETE CBC W/AUTO DIFF WBC: CPT

## 2020-06-26 PROCEDURE — 2580000003 HC RX 258: Performed by: INTERNAL MEDICINE

## 2020-06-26 PROCEDURE — 6360000004 HC RX CONTRAST MEDICATION: Performed by: HOSPITALIST

## 2020-06-26 PROCEDURE — 6360000002 HC RX W HCPCS: Performed by: HOSPITALIST

## 2020-06-26 PROCEDURE — 6370000000 HC RX 637 (ALT 250 FOR IP): Performed by: HOSPITALIST

## 2020-06-26 PROCEDURE — 2500000003 HC RX 250 WO HCPCS: Performed by: EMERGENCY MEDICINE

## 2020-06-26 PROCEDURE — 80053 COMPREHEN METABOLIC PANEL: CPT

## 2020-06-26 PROCEDURE — 82384 ASSAY THREE CATECHOLAMINES: CPT

## 2020-06-26 PROCEDURE — 94660 CPAP INITIATION&MGMT: CPT

## 2020-06-26 PROCEDURE — 6360000002 HC RX W HCPCS: Performed by: EMERGENCY MEDICINE

## 2020-06-26 PROCEDURE — 93010 ELECTROCARDIOGRAM REPORT: CPT | Performed by: INTERNAL MEDICINE

## 2020-06-26 PROCEDURE — 2709999900 HC NON-CHARGEABLE SUPPLY

## 2020-06-26 PROCEDURE — 6370000000 HC RX 637 (ALT 250 FOR IP): Performed by: INTERNAL MEDICINE

## 2020-06-26 PROCEDURE — B2151ZZ FLUOROSCOPY OF LEFT HEART USING LOW OSMOLAR CONTRAST: ICD-10-PCS | Performed by: INTERNAL MEDICINE

## 2020-06-26 PROCEDURE — 4A023N7 MEASUREMENT OF CARDIAC SAMPLING AND PRESSURE, LEFT HEART, PERCUTANEOUS APPROACH: ICD-10-PCS | Performed by: INTERNAL MEDICINE

## 2020-06-26 PROCEDURE — 2500000003 HC RX 250 WO HCPCS: Performed by: INTERNAL MEDICINE

## 2020-06-26 PROCEDURE — B2111ZZ FLUOROSCOPY OF MULTIPLE CORONARY ARTERIES USING LOW OSMOLAR CONTRAST: ICD-10-PCS | Performed by: INTERNAL MEDICINE

## 2020-06-26 PROCEDURE — 36415 COLL VENOUS BLD VENIPUNCTURE: CPT

## 2020-06-26 PROCEDURE — 36252 INS CATH REN ART 1ST BILAT: CPT | Performed by: INTERNAL MEDICINE

## 2020-06-26 PROCEDURE — 99152 MOD SED SAME PHYS/QHP 5/>YRS: CPT | Performed by: INTERNAL MEDICINE

## 2020-06-26 PROCEDURE — 2100000000 HC CCU R&B

## 2020-06-26 PROCEDURE — 6360000002 HC RX W HCPCS

## 2020-06-26 PROCEDURE — 93458 L HRT ARTERY/VENTRICLE ANGIO: CPT | Performed by: INTERNAL MEDICINE

## 2020-06-26 PROCEDURE — 2500000003 HC RX 250 WO HCPCS

## 2020-06-26 PROCEDURE — 82947 ASSAY GLUCOSE BLOOD QUANT: CPT

## 2020-06-26 PROCEDURE — 2580000003 HC RX 258: Performed by: EMERGENCY MEDICINE

## 2020-06-26 PROCEDURE — C1894 INTRO/SHEATH, NON-LASER: HCPCS

## 2020-06-26 PROCEDURE — 99152 MOD SED SAME PHYS/QHP 5/>YRS: CPT

## 2020-06-26 PROCEDURE — 2700000000 HC OXYGEN THERAPY PER DAY

## 2020-06-26 PROCEDURE — C1769 GUIDE WIRE: HCPCS

## 2020-06-26 PROCEDURE — 80202 ASSAY OF VANCOMYCIN: CPT

## 2020-06-26 PROCEDURE — 8010000000 HC HEMODIALYSIS ACUTE INPT

## 2020-06-26 PROCEDURE — 99153 MOD SED SAME PHYS/QHP EA: CPT

## 2020-06-26 PROCEDURE — 6370000000 HC RX 637 (ALT 250 FOR IP)

## 2020-06-26 PROCEDURE — 93458 L HRT ARTERY/VENTRICLE ANGIO: CPT

## 2020-06-26 RX ORDER — IODIXANOL 320 MG/ML
150 INJECTION, SOLUTION INTRAVASCULAR
Status: COMPLETED | OUTPATIENT
Start: 2020-06-26 | End: 2020-06-26

## 2020-06-26 RX ORDER — SODIUM CHLORIDE 0.9 % (FLUSH) 0.9 %
10 SYRINGE (ML) INJECTION PRN
Status: DISCONTINUED | OUTPATIENT
Start: 2020-06-26 | End: 2020-06-26 | Stop reason: SDUPTHER

## 2020-06-26 RX ORDER — SODIUM CHLORIDE 9 MG/ML
INJECTION, SOLUTION INTRAVENOUS CONTINUOUS
Status: DISCONTINUED | OUTPATIENT
Start: 2020-06-26 | End: 2020-06-26

## 2020-06-26 RX ORDER — HYDRALAZINE HYDROCHLORIDE 50 MG/1
100 TABLET, FILM COATED ORAL EVERY 8 HOURS SCHEDULED
Status: DISCONTINUED | OUTPATIENT
Start: 2020-06-26 | End: 2020-06-27 | Stop reason: HOSPADM

## 2020-06-26 RX ORDER — ACETAMINOPHEN 325 MG/1
650 TABLET ORAL EVERY 4 HOURS PRN
Status: DISCONTINUED | OUTPATIENT
Start: 2020-06-26 | End: 2020-06-27 | Stop reason: HOSPADM

## 2020-06-26 RX ORDER — SODIUM CHLORIDE 0.9 % (FLUSH) 0.9 %
10 SYRINGE (ML) INJECTION PRN
Status: DISCONTINUED | OUTPATIENT
Start: 2020-06-26 | End: 2020-06-27 | Stop reason: HOSPADM

## 2020-06-26 RX ORDER — ONDANSETRON 2 MG/ML
4 INJECTION INTRAMUSCULAR; INTRAVENOUS EVERY 6 HOURS PRN
Status: DISCONTINUED | OUTPATIENT
Start: 2020-06-26 | End: 2020-06-26 | Stop reason: SDUPTHER

## 2020-06-26 RX ORDER — HYDRALAZINE HYDROCHLORIDE 20 MG/ML
20 INJECTION INTRAMUSCULAR; INTRAVENOUS EVERY 4 HOURS PRN
Status: DISCONTINUED | OUTPATIENT
Start: 2020-06-26 | End: 2020-06-27 | Stop reason: HOSPADM

## 2020-06-26 RX ORDER — SODIUM CHLORIDE 9 MG/ML
INJECTION, SOLUTION INTRAVENOUS CONTINUOUS
Status: DISCONTINUED | OUTPATIENT
Start: 2020-06-26 | End: 2020-06-27 | Stop reason: HOSPADM

## 2020-06-26 RX ORDER — SODIUM CHLORIDE 0.9 % (FLUSH) 0.9 %
10 SYRINGE (ML) INJECTION EVERY 12 HOURS SCHEDULED
Status: DISCONTINUED | OUTPATIENT
Start: 2020-06-26 | End: 2020-06-26 | Stop reason: SDUPTHER

## 2020-06-26 RX ORDER — SODIUM CHLORIDE 0.9 % (FLUSH) 0.9 %
10 SYRINGE (ML) INJECTION EVERY 12 HOURS SCHEDULED
Status: DISCONTINUED | OUTPATIENT
Start: 2020-06-26 | End: 2020-06-27 | Stop reason: HOSPADM

## 2020-06-26 RX ADMIN — LISINOPRIL 20 MG: 20 TABLET ORAL at 09:02

## 2020-06-26 RX ADMIN — INSULIN LISPRO 4 UNITS: 100 INJECTION, SOLUTION INTRAVENOUS; SUBCUTANEOUS at 09:06

## 2020-06-26 RX ADMIN — ATORVASTATIN CALCIUM 20 MG: 20 TABLET, FILM COATED ORAL at 09:04

## 2020-06-26 RX ADMIN — IODIXANOL 110 ML: 320 INJECTION, SOLUTION INTRAVASCULAR at 14:07

## 2020-06-26 RX ADMIN — ISOSORBIDE MONONITRATE 120 MG: 60 TABLET, EXTENDED RELEASE ORAL at 09:04

## 2020-06-26 RX ADMIN — HYDRALAZINE HYDROCHLORIDE 20 MG: 20 INJECTION INTRAMUSCULAR; INTRAVENOUS at 09:01

## 2020-06-26 RX ADMIN — CLONIDINE HYDROCHLORIDE 0.3 MG: 0.1 TABLET ORAL at 14:50

## 2020-06-26 RX ADMIN — INSULIN LISPRO 3 UNITS: 100 INJECTION, SOLUTION INTRAVENOUS; SUBCUTANEOUS at 14:50

## 2020-06-26 RX ADMIN — LISINOPRIL 20 MG: 20 TABLET ORAL at 20:41

## 2020-06-26 RX ADMIN — ROPINIROLE HYDROCHLORIDE 2 MG: 2 TABLET, FILM COATED ORAL at 20:41

## 2020-06-26 RX ADMIN — HYDRALAZINE HYDROCHLORIDE 100 MG: 50 TABLET, FILM COATED ORAL at 14:49

## 2020-06-26 RX ADMIN — CLONIDINE HYDROCHLORIDE 0.3 MG: 0.1 TABLET ORAL at 04:33

## 2020-06-26 RX ADMIN — HYDRALAZINE HYDROCHLORIDE 75 MG: 50 TABLET, FILM COATED ORAL at 04:33

## 2020-06-26 RX ADMIN — ONDANSETRON 4 MG: 2 INJECTION INTRAMUSCULAR; INTRAVENOUS at 19:37

## 2020-06-26 RX ADMIN — LEVOTHYROXINE SODIUM 150 MCG: 75 TABLET ORAL at 09:03

## 2020-06-26 RX ADMIN — SODIUM CHLORIDE, PRESERVATIVE FREE 10 ML: 5 INJECTION INTRAVENOUS at 09:05

## 2020-06-26 RX ADMIN — HEPARIN SODIUM 5000 UNITS: 5000 INJECTION INTRAVENOUS; SUBCUTANEOUS at 20:53

## 2020-06-26 RX ADMIN — NITROGLYCERIN 25 MCG/MIN: 20 INJECTION INTRAVENOUS at 06:29

## 2020-06-26 RX ADMIN — DEXTROSE MONOHYDRATE 5 MG/HR: 50 INJECTION, SOLUTION INTRAVENOUS at 16:04

## 2020-06-26 RX ADMIN — NIFEDIPINE 90 MG: 60 TABLET, FILM COATED, EXTENDED RELEASE ORAL at 09:03

## 2020-06-26 RX ADMIN — HYDRALAZINE HYDROCHLORIDE 20 MG: 20 INJECTION INTRAMUSCULAR; INTRAVENOUS at 02:07

## 2020-06-26 RX ADMIN — VANCOMYCIN HYDROCHLORIDE 1000 MG: 10 INJECTION, POWDER, LYOPHILIZED, FOR SOLUTION INTRAVENOUS at 20:52

## 2020-06-26 RX ADMIN — METOPROLOL SUCCINATE 100 MG: 50 TABLET, EXTENDED RELEASE ORAL at 06:13

## 2020-06-26 RX ADMIN — CLONIDINE HYDROCHLORIDE 0.3 MG: 0.1 TABLET ORAL at 20:40

## 2020-06-26 RX ADMIN — INSULIN GLARGINE 15 UNITS: 100 INJECTION, SOLUTION SUBCUTANEOUS at 20:54

## 2020-06-26 RX ADMIN — INSULIN LISPRO 1 UNITS: 100 INJECTION, SOLUTION INTRAVENOUS; SUBCUTANEOUS at 20:56

## 2020-06-26 RX ADMIN — SODIUM CHLORIDE, PRESERVATIVE FREE 10 ML: 5 INJECTION INTRAVENOUS at 20:41

## 2020-06-26 RX ADMIN — ACETAMINOPHEN 650 MG: 325 TABLET, FILM COATED ORAL at 09:04

## 2020-06-26 RX ADMIN — HYDROCHLOROTHIAZIDE 25 MG: 25 TABLET ORAL at 09:04

## 2020-06-26 RX ADMIN — HYDRALAZINE HYDROCHLORIDE 100 MG: 50 TABLET, FILM COATED ORAL at 20:40

## 2020-06-26 RX ADMIN — DULOXETINE 60 MG: 60 CAPSULE, DELAYED RELEASE ORAL at 09:05

## 2020-06-26 RX ADMIN — DEXTROSE MONOHYDRATE 8 MG/HR: 50 INJECTION, SOLUTION INTRAVENOUS at 20:15

## 2020-06-26 RX ADMIN — ACETAMINOPHEN 650 MG: 325 TABLET ORAL at 17:06

## 2020-06-26 ASSESSMENT — PAIN DESCRIPTION - PAIN TYPE
TYPE: ACUTE PAIN

## 2020-06-26 ASSESSMENT — PAIN DESCRIPTION - LOCATION
LOCATION: HEAD
LOCATION: HEAD
LOCATION: GENERALIZED
LOCATION: HEAD

## 2020-06-26 ASSESSMENT — PAIN SCALES - GENERAL
PAINLEVEL_OUTOF10: 0
PAINLEVEL_OUTOF10: 9
PAINLEVEL_OUTOF10: 7
PAINLEVEL_OUTOF10: 8
PAINLEVEL_OUTOF10: 10
PAINLEVEL_OUTOF10: 8

## 2020-06-26 ASSESSMENT — PAIN DESCRIPTION - FREQUENCY: FREQUENCY: CONTINUOUS

## 2020-06-26 ASSESSMENT — PAIN DESCRIPTION - PROGRESSION: CLINICAL_PROGRESSION: RAPIDLY WORSENING

## 2020-06-26 ASSESSMENT — PAIN DESCRIPTION - DESCRIPTORS
DESCRIPTORS: SHARP;SHOOTING;HEADACHE
DESCRIPTORS: HEADACHE;POUNDING
DESCRIPTORS: CRAMPING;ACHING;SHOOTING

## 2020-06-26 ASSESSMENT — PAIN - FUNCTIONAL ASSESSMENT: PAIN_FUNCTIONAL_ASSESSMENT: PREVENTS OR INTERFERES SOME ACTIVE ACTIVITIES AND ADLS

## 2020-06-26 ASSESSMENT — PAIN DESCRIPTION - ONSET: ONSET: ON-GOING

## 2020-06-26 NOTE — CONSULTS
[id number] [facility]  Cardiology Consult      Requesting MD:  Yessica Caruso MD   Admit Status:  Inpatient [101]       History obtained from:   ? Patient  ?  Other (specify):     PROBLEM LIST:    Patient Active Problem List    Diagnosis Date Noted    Hypertensive emergency 06/24/2020     Priority: Low    Type 2 diabetes mellitus with hyperosmolar coma, with long-term current use of insulin ([address]      Priority: Low    Seizure ([address] 10/08/2019     Priority: Low    Palliative care patient 10/08/2019     Priority: Low    Hyperglycemic hyperosmolar nonketotic coma ([address]      Priority: Low    Accelerated hypertension 04/20/2019     Priority: Low    Medically noncompliant 04/20/2019     Priority: Low    Noncompliance with medications      Priority: Low    Left homonymous hemianopsia      Priority: Low    Acute intractable headache      Priority: Low    Volume overload 03/27/2019     Priority: Low    Hyponatremia 01/26/2019     Priority: Low    Hyperglycemia due to type 2 diabetes mellitus ([address] 01/26/2019     Priority: Low    Normocytic anemia 01/26/2019     Priority: Low    ESRD on hemodialysis ([address] 01/25/2019     Priority: Low    Steal syndrome as complication of dialysis access ([address] 12/19/2018     Priority: Low    PVD (peripheral vascular disease) ([address]      Priority: Low    High hepatic iron concentration determined by biopsy of liver 12/05/2018     Priority: Low    ESRD (end stage renal disease) on dialysis [facility])      Priority: Low    Abnormal CT of liver 08/15/2018     Priority: Low    Other ascites 08/15/2018     Priority: Low    Gastroparesis 08/08/2018     Priority: Low    Nausea and vomiting 08/08/2018     Priority: Low    Chronic constipation 08/08/2018     Priority: Low    Uncontrolled type 2 diabetes mellitus with complication, with long-term current use of insulin ([address] 08/08/2018     Priority: Low    Dialysis AV fistula malfunction ([address] 06/27/2018     Priority: lisinopril (PRINIVIL;ZESTRIL) 20 MG tablet Take 1 tablet by mouth 2 times daily 4/23/19  Yes Maude Queen,    metoprolol succinate (TOPROL XL) 100 MG extended release tablet Take 1 tablet by mouth every morning (before breakfast) 4/24/19  Yes Maude Queen DO   hydrALAZINE (APRESOLINE) 50 MG tablet Take 1 tablet by mouth every 8 hours 4/2/19  Yes René Ann,    insulin detemir (LEVEMIR) 100 UNIT/ML injection vial Indications: Diabetes Through insulin pump.    Yes Historical Provider, MD   albuterol sulfate  (90 Base) MCG/ACT inhaler Inhale 2 puffs into the lungs every 4 hours as needed   Yes Historical Provider, MD   levothyroxine (SYNTHROID) 150 MCG tablet Take 150 mcg by mouth Daily   Yes Historical Provider, MD   NIFEdipine (ADALAT CC) 60 MG extended release tablet Take 60 mg by mouth daily   Yes Historical Provider, MD   isosorbide mononitrate (IMDUR) 120 MG extended release tablet Take 1 tablet by mouth daily 5/27/17  Yes Wayne Aquino,    ondansetron (ZOFRAN) 4 MG tablet Take 4 mg by mouth every 8 hours as needed for Nausea or Vomiting   Yes Historical Provider, MD   DULoxetine (CYMBALTA) 60 MG extended release capsule Take 60 mg by mouth daily   Yes Historical Provider, MD   rOPINIRole (REQUIP) 2 MG tablet Take 2 mg by mouth nightly    Yes Historical Provider, MD   simvastatin (ZOCOR) 40 MG tablet Take 40 mg by mouth nightly    Yes Historical Provider, MD   insulin aspart (NOVOLOG FLEXPEN) 100 UNIT/ML injection pen Inject 5 Units into the skin 3 times daily    Yes Historical Provider, MD   levETIRAcetam (KEPPRA) 500 MG tablet Take 1 tablet by mouth 2 times daily 10/13/19   Lei Bernard MD       Current Meds:   sodium chloride flush  10 mL Intravenous 2 times per day    heparin (porcine)  5,000 Units Subcutaneous 3 times per day    vancomycin (VANCOCIN) intermittent dosing (placeholder)   Other RX Placeholder    cloNIDine  0.3 mg Oral 3 times per day    DULoxetine  60 mg Oral Daily    heard  Pulmonary:      Effort: Pulmonary effort is normal. No respiratory distress. Breath sounds: Normal breath sounds. No wheezing or rales. Comments: Some basal crepitations noted  Chest:      Chest wall: No tenderness. Abdominal:      General: Bowel sounds are normal. There is no distension. Palpations: Abdomen is soft. There is no mass. Tenderness: There is no abdominal tenderness. There is no guarding or rebound. Hernia: No hernia is present. Comments: No palpable organomegaly   Musculoskeletal: Normal range of motion. Skin:     General: Skin is warm. Coloration: Skin is not pale. Findings: No rash. Neurological:      General: No focal deficit present. Mental Status: She is alert and oriented to person, place, and time. Cranial Nerves: No cranial nerve deficit. Deep Tendon Reflexes: Reflexes normal.           Labs:  Recent Labs     06/24/20  2320   WBC 5.1   HGB 10.2*   *       Recent Labs     06/24/20  2320 06/24/20  2357 06/25/20  0618   *  --  136   K 4.2 4.1 3.9   CL 93*  --  94*   CO2 30*  --  28   BUN 27*  --  29*   CREATININE 4.3*  --  4.4*   LABGLOM 11*  --  11*   MG 2.3  --   --    CALCIUM 8.6  --  8.3*   PHOS 4.3  --   --        CK, CKMB, Troponin: @LABRCNT (CKTOTAL:3, CKMB:3, TROPONINI:3)@    Last 3 BNP:          IMAGING:  Xr Chest Portable    Result Date: 6/25/2020  EXAMINATION:  XR CHEST PORTABLE  6/25/2020 7:07 AM HISTORY: Shortness of air. COMPARISON: 7/8/2019. FINDINGS:  There are bilateral infiltrates in the mid and lower lung zones. There is mild blunting of the right costophrenic angle. There is mild cardiomegaly. There is a left subclavian and axillary vein venous stent. There is another partially imaged venous stent in the left arm. No acute bony abnormality is seen. 1. Bilateral infiltrates may represent pneumonia or pulmonary edema. There is minimal right pleural effusion. 2. Mild cardiomegaly. 3. Venous stents.

## 2020-06-26 NOTE — PROGRESS NOTES
[facility] Hospitalists      Patient:  Sun Jones  YOB: 1970  Date of Service: 6/26/2020  MRN: [id number]   Acct: [de-identified]   Primary Care Physician: ROMINA Lugo  Advance Directive: Full Code  Admit Date: 6/24/2020       Hospital Day: 2    Chief Complaint:   Chief Complaint   Patient presents with    Shortness of Breath   Sun Jones is a 45 y.o. female  whom we were consulted for end-stage renal disease. She has end-stage renal disease secondary to diabetic nephropathy and goes to [facility] dialysis clinic on Tuesday Thursday Saturday. Presenting to the emergency room complaining of increasing shortness of breath and not feeling well. He also reports some increasing edema. Her systolic and diastolic blood pressure was extremely high, admitted to CCU to start intravenous Cardene. Subjective: pt seen and examined, feeling better. sched for heart cath later today. BS uncontrolled- insulin pump ran out. Pt npo on sliding scale. BP improved off nitrogtt. Objective:   VITALS:  BP (!) 220/80   Pulse 65   Temp 99 °F (37.2 °C) (Temporal)   Resp 13   Ht 5' 6\" (1.676 m)   Wt 156 lb 1.6 oz (70.8 kg)   SpO2 100%   BMI 25.20 kg/m²   24HR INTAKE/OUTPUT:      Intake/Output Summary (Last 24 hours) at 6/26/2020 1042  Last data filed at 6/26/2020 0901  Gross per 24 hour   Intake 420 ml   Output 4000 ml   Net -3580 ml     Constitutional: Oriented to person, place, and time. Well-developed and well-nourished. No distress. HENT:    Head: Normocephalic and atraumatic. Mouth/Throat: Oropharynx is clear and moist. No oropharyngeal exudate. Eyes: Conjunctivae and EOM are normal. Pupils are equal, round, and reactive to light. No scleral icterus. Neck: Normal range of motion. Neck supple. No JVD present. No tracheal deviation present. No thyromegaly present. Cardiovascular: Normal rate, regular rhythm and normal heart sounds. Exam reveals no gallop and no friction rub. Pulmonary/Chest: Effort normal and breath sounds normal. No stridor. No respiratory distress. No wheezes. No rales. Abdominal: Soft. Bowel sounds are normal. No distension and no mass. There is no tenderness. There is no rebound and no guarding. Musculoskeletal: Normal range of motion. There is no edema or tenderness. Extremities: No edema  Neurological: Alert and oriented to person, place, and time. No cranial nerve deficit. Skin: Skin is warm and dry. No rash noted. Not diaphoretic. No erythema. No pallor. Psychiatric: Normal mood and affect. Behavior is normal. Judgment and thought content normal.     Medications:      sodium chloride      nitroGLYCERIN 35 mcg/min (06/26/20 0645)      sodium chloride flush  10 mL Intravenous 2 times per day    NIFEdipine  90 mg Oral Daily    hydrALAZINE  100 mg Oral 3 times per day    heparin (porcine)  5,000 Units Subcutaneous 3 times per day    vancomycin (VANCOCIN) intermittent dosing (placeholder)   Other RX Placeholder    cloNIDine  0.3 mg Oral 3 times per day    DULoxetine  60 mg Oral Daily    hydroCHLOROthiazide  25 mg Oral Daily    insulin glargine  15 Units Subcutaneous Nightly    isosorbide mononitrate  120 mg Oral Daily    levothyroxine  150 mcg Oral Daily    lisinopril  20 mg Oral BID    metoprolol succinate  100 mg Oral QAM AC    rOPINIRole  2 mg Oral Nightly    sevelamer  800 mg Oral TID WC    atorvastatin  20 mg Oral Daily    insulin lispro  0-6 Units Subcutaneous TID WC    insulin lispro  0-3 Units Subcutaneous Nightly     sodium chloride flush, hydrALAZINE, acetaminophen **OR** acetaminophen, ondansetron **OR** ondansetron, glucose, glucagon (rDNA), albuterol  DIET CARDIAC;   Diet NPO Time Specified Exceptions are: Sips with Meds         Lab and other Data:     Recent Labs     06/24/20 2320 06/26/20  0216   WBC 5.1 3.7*   HGB 10.2* 9.5*   * 94*     Recent Labs     06/24/20 2320 06/25/20 0618 06/26/20  0216   *  -- 136 134*   K 4.2   < > 3.9 3.8  3.8   CL 93*  --  94* 92*   CO2 30*  --  28 31*   BUN 27*  --  29* 19   CREATININE 4.3*  --  4.4* 3.6*   GLUCOSE 174*  --  149* 299*    < > = values in this interval not displayed. Recent Labs     06/24/20  2320 06/26/20  0216   AST 30 17   ALT 26 17   BILITOT 0.4 0.3   ALKPHOS 128* 107*     Troponin T:   Recent Labs     06/25/20  0618 06/25/20  1143 06/25/20  1700   TROPONINI 0.04* 0.04* 0.05*     Pro-BNP: No results for input(s): BNP in the last 72 hours. INR:   Recent Labs     06/24/20 2320   INR 1.07     ABGs:   Lab Results   Component Value Date    PHART 7.570 06/24/2020    PO2ART 50.0 06/24/2020    JAO7RAB 36.0 06/24/2020     UA:No results for input(s): NITRITE, COLORU, PHUR, LABCAST, WBCUA, RBCUA, MUCUS, TRICHOMONAS, YEAST, BACTERIA, CLARITYU, SPECGRAV, LEUKOCYTESUR, UROBILINOGEN, BILIRUBINUR, BLOODU, GLUCOSEU, AMORPHOUS in the last 72 hours. Invalid input(s): KETONESU    Imaging:   XR CHEST PORTABLE   Final Result   1. Bilateral infiltrates may represent pneumonia or pulmonary edema. There is minimal right pleural effusion. 2. Mild cardiomegaly. 3. Venous stents. Signed by Dr Cristiana Glass on 6/25/2020 7:08 AM        Micro:   Blood Culture, Routine   Date Value Ref Range Status   06/24/2020   Preliminary    No Growth to date.   Any change in status will be called.   , No components found for: LABURINE  Patient Active Problem List    Diagnosis Date Noted    Hypertensive emergency 06/24/2020    Type 2 diabetes mellitus with hyperosmolar coma, with long-term current use of insulin ([address]     Seizure ([address] 10/08/2019    Palliative care patient 10/08/2019    Hyperglycemic hyperosmolar nonketotic coma ([address]     Accelerated hypertension 04/20/2019    Medically noncompliant 04/20/2019    Noncompliance with medications     Left homonymous hemianopsia     Acute intractable headache     Volume overload 03/27/2019    Hyponatremia 01/26/2019    Hyperglycemia

## 2020-06-26 NOTE — PROGRESS NOTES
Nephrology ([facility]) Progress Note      Patient:  Gertrudis Thomas  YOB: 1970  Date of Service: 6/26/2020  MRN: [id number]   Acct: [de-identified]   Primary Care Physician: ROMINA Talamantes  Advance Directive: Full Code  Admit Date: 6/24/2020       Hospital Day: 2  Referring Provider: Shadi Hylton MD    Patient independently seen and examined, Chart, Consults, Notes, Operative notes, Labs, Cardiology, and Radiology studies reviewed as able. Chief complaint: End Stage renal disease/shortness of breath    Subjective:  Gertrudis Thomas is a 45 y.o. female  whom we were consulted for end-stage renal disease. She has end-stage renal disease secondary to diabetic nephropathy and goes to [facility] dialysis clinic on Tuesday Thursday Saturday. Presenting to the emergency room complaining of increasing shortness of breath and not feeling well. He also reports some increasing edema. Her systolic and diastolic blood pressure was extremely high, admitted to CCU to start intravenous Cardene. She is scheduled to have routine hemodialysis treatment today. However she is blaming dialysis staff not removing enough fluid. In fact she is noncompliant with fluid restriction, does not understand dialysis rules. On June 25, she underwent dialysis treatment with large ultrafiltration. This morning she is breathing better her blood pressure still very high. She is scheduled to have cardiac catheterization today. Allergies:  Penicillins;  Lamisil advanced [terbinafine]; and Stadol [butorphanol]    Medicines:  Current Facility-Administered Medications   Medication Dose Route Frequency Provider Last Rate Last Dose    0.9 % sodium chloride infusion   Intravenous Continuous Lisbet Manuel MD        sodium chloride flush 0.9 % injection 10 mL  10 mL Intravenous 2 times per day Lisbet Manuel MD   10 mL at 06/26/20 0905    sodium chloride flush 0.9 % injection 10 mL  10 mL Intravenous PRN Lisbet Manuel MD        hydrALAZINE (APRESOLINE) injection 20 mg  20 mg Intravenous Q4H PRN Sandoval Palacios MD   20 mg at 06/26/20 0901    hydrALAZINE (APRESOLINE) tablet 75 mg  75 mg Oral 3 times per day Sandoval Palacios MD   75 mg at 06/26/20 0433    NIFEdipine (PROCARDIA XL) extended release tablet 90 mg  90 mg Oral Daily Sandoval Palacios MD   90 mg at 06/26/20 0903    acetaminophen (TYLENOL) tablet 650 mg  650 mg Oral Q6H PRN Sandoval Palacios MD   650 mg at 06/26/20 4781    Or    acetaminophen (TYLENOL) suppository 650 mg  650 mg Rectal Q6H PRN Sandoval Palacios MD        ondansetron (ZOFRAN-ODT) disintegrating tablet 4 mg  4 mg Oral Q8H PRN Sandoval Palacios MD        Or    ondansetron [facility]) injection 4 mg  4 mg Intravenous Q6H PRN Sandoval Palacios MD   4 mg at 06/25/20 0065    heparin (porcine) injection 5,000 Units  5,000 Units Subcutaneous 3 times per day Sandoval Palacios MD   5,000 Units at 06/25/20 2209    vancomycin (VANCOCIN) intermittent dosing (placeholder)   Other RX Placeholder Soni Rizvi MD        cloNIDine (CATAPRES) tablet 0.3 mg  0.3 mg Oral 3 times per day Sandoval Palacios MD   0.3 mg at 06/26/20 0433    DULoxetine (CYMBALTA) extended release capsule 60 mg  60 mg Oral Daily Sandoval Palacios MD   60 mg at 06/26/20 0905    hydroCHLOROthiazide (HYDRODIURIL) tablet 25 mg  25 mg Oral Daily Sandoval Palacios MD   25 mg at 06/26/20 9239    insulin glargine (LANTUS) injection vial 15 Units  15 Units Subcutaneous Nightly Sandoval Palacios MD        isosorbide mononitrate (IMDUR) extended release tablet 120 mg  120 mg Oral Daily Sandoval Palacios MD   120 mg at 06/26/20 5437    levothyroxine (SYNTHROID) tablet 150 mcg  150 mcg Oral Daily Sandoval Palacios MD   150 mcg at 06/26/20 0903    lisinopril (PRINIVIL;ZESTRIL) tablet 20 mg  20 mg Oral BID Sandoval Palacios MD   20 mg at 06/26/20 0902    metoprolol succinate (TOPROL XL) extended release tablet 100 mg  100 mg Oral QAM  Sandoval Palacios MD   100 mg at 06/26/20 5552

## 2020-06-26 NOTE — PROGRESS NOTES
Vancomycin Day: 2  Current Dosing: pulse    Temp max:  99    Recent Labs     06/25/20  0618 06/26/20  0216   BUN 29* 19       Recent Labs     06/25/20  0618 06/26/20  0216   CREATININE 4.4* 3.6*       Recent Labs     06/24/20  2320 06/26/20  0216   WBC 5.1 3.7*         Intake/Output Summary (Last 24 hours) at 6/26/2020 1132  Last data filed at 6/26/2020 0901  Gross per 24 hour   Intake 420 ml   Output 4000 ml   Net -3580 ml       Culture Date Source Results   06/25/20 Blood x 2 No Growth                 Ht Readings from Last 1 Encounters:   06/24/20 5' 6\" (1.676 m)        Wt Readings from Last 1 Encounters:   06/26/20 156 lb 1.6 oz (70.8 kg)         Body mass index is 25.2 kg/m². Estimated Creatinine Clearance: 17 mL/min (A) (based on SCr of 3.6 mg/dL (H)). Level ordered for: after next dialysis    Assessment/Plan:  After next dialysis treatment pharmacy will order a random Vancomycin level and evaluate for further dosing.     Electronically signed by Jonas William [address] [place] [address] on 6/26/2020 at 11:32 AM

## 2020-06-26 NOTE — PROGRESS NOTES
Breath sounds: Normal breath sounds. No wheezing or rales. Chest:      Chest wall: No tenderness. Abdominal:      General: Bowel sounds are normal. There is no distension. Palpations: Abdomen is soft. There is no mass. Tenderness: There is no abdominal tenderness. There is no guarding or rebound. Hernia: No hernia is present. Comments: No organomegaly palpable   Musculoskeletal: Normal range of motion. Skin:     General: Skin is warm. Coloration: Skin is not pale. Findings: No rash. Neurological:      Mental Status: She is alert and oriented to person, place, and time. Cranial Nerves: No cranial nerve deficit. Deep Tendon Reflexes: Reflexes normal.                 Lab Data:  CBC:   Recent Labs     06/24/20 2320 06/26/20 0216   WBC 5.1 3.7*   HGB 10.2* 9.5*   HCT 31.7* 29.2*   MCV 96.9 98.0   * 94*     BMP:   Recent Labs     06/24/20 2320 06/25/20 0618 06/26/20 0216   *  --  136 134*   K 4.2   < > 3.9 3.8  3.8   CL 93*  --  94* 92*   CO2 30*  --  28 31*   PHOS 4.3  --   --   --    BUN 27*  --  29* 19   CREATININE 4.3*  --  4.4* 3.6*    < > = values in this interval not displayed. LIVER PROFILE:   Recent Labs     06/24/20 2320 06/26/20 0216   AST 30 17   ALT 26 17   BILITOT 0.4 0.3   ALKPHOS 128* 107*     PT/INR:   Recent Labs     06/24/20 2320   PROTIME 13.8   INR 1.07     APTT:   Recent Labs     06/24/20 2320   APTT 27.5     BNP:  No results for input(s): BNP in the last 72 hours. CK, CKMB, Troponin: @LABRCNT (CKTOTAL:3, CKMB:3, TROPONINI:3)@    IMAGING:  Xr Chest Portable    Result Date: 6/25/2020  EXAMINATION:  XR CHEST PORTABLE  6/25/2020 7:07 AM HISTORY: Shortness of air. COMPARISON: 7/8/2019. FINDINGS:  There are bilateral infiltrates in the mid and lower lung zones. There is mild blunting of the right costophrenic angle. There is mild cardiomegaly. There is a left subclavian and axillary vein venous stent.  There is another

## 2020-06-26 NOTE — PROGRESS NOTES
Michelle Sandoval transferred to inpatient dialysis from 705 via bed. Reason for transfer: dialysis   Explained reason for transfer to Patient. Belongings: clothing, purse, glasses, cellphone,  with patient at bedside . Soft chart transferred with patient: No.  Telemetry box number SP [id number]  transferred with patient: yes, per Dr Siri Melchor and Dr Tacos Sylvester orders. This RN at bedside monitoring pt TR band and Radial cath site.         Electronically signed by Huyen Morrison RN on 6/26/2020 at 3:08 PM

## 2020-06-27 VITALS
SYSTOLIC BLOOD PRESSURE: 158 MMHG | HEIGHT: 66 IN | BODY MASS INDEX: 24.93 KG/M2 | OXYGEN SATURATION: 95 % | WEIGHT: 155.1 LBS | HEART RATE: 55 BPM | DIASTOLIC BLOOD PRESSURE: 63 MMHG | TEMPERATURE: 98.7 F | RESPIRATION RATE: 15 BRPM

## 2020-06-27 LAB
ANION GAP SERPL CALCULATED.3IONS-SCNC: 9 MMOL/L (ref 7–19)
BASOPHILS ABSOLUTE: 0.1 K/UL (ref 0–0.2)
BASOPHILS RELATIVE PERCENT: 2.8 % (ref 0–1)
BUN BLDV-MCNC: 12 MG/DL (ref 6–20)
CALCIUM SERPL-MCNC: 8.2 MG/DL (ref 8.6–10)
CHLORIDE BLD-SCNC: 94 MMOL/L (ref 98–111)
CO2: 29 MMOL/L (ref 22–29)
CREAT SERPL-MCNC: 2.9 MG/DL (ref 0.5–0.9)
EOSINOPHILS ABSOLUTE: 0.2 K/UL (ref 0–0.6)
EOSINOPHILS RELATIVE PERCENT: 4.2 % (ref 0–5)
GFR NON-AFRICAN AMERICAN: 17
GLUCOSE BLD-MCNC: 255 MG/DL (ref 70–99)
GLUCOSE BLD-MCNC: 268 MG/DL (ref 74–109)
GLUCOSE BLD-MCNC: 88 MG/DL (ref 70–99)
HCT VFR BLD CALC: 30.7 % (ref 37–47)
HEMOGLOBIN: 9.9 G/DL (ref 12–16)
IMMATURE GRANULOCYTES #: 0 K/UL
LYMPHOCYTES ABSOLUTE: 0.7 K/UL (ref 1.1–4.5)
LYMPHOCYTES RELATIVE PERCENT: 20.4 % (ref 20–40)
MCH RBC QN AUTO: 31.5 PG (ref 27–31)
MCHC RBC AUTO-ENTMCNC: 32.2 G/DL (ref 33–37)
MCV RBC AUTO: 97.8 FL (ref 81–99)
MONOCYTES ABSOLUTE: 0.3 K/UL (ref 0–0.9)
MONOCYTES RELATIVE PERCENT: 7.9 % (ref 0–10)
NEUTROPHILS ABSOLUTE: 2.3 K/UL (ref 1.5–7.5)
NEUTROPHILS RELATIVE PERCENT: 64.4 % (ref 50–65)
PDW BLD-RTO: 13.2 % (ref 11.5–14.5)
PERFORMED ON: ABNORMAL
PERFORMED ON: NORMAL
PLATELET # BLD: 99 K/UL (ref 130–400)
PMV BLD AUTO: 10.9 FL (ref 9.4–12.3)
POTASSIUM REFLEX MAGNESIUM: 4 MMOL/L (ref 3.5–5)
RBC # BLD: 3.14 M/UL (ref 4.2–5.4)
SODIUM BLD-SCNC: 132 MMOL/L (ref 136–145)
VANCOMYCIN RANDOM: 16.1 UG/ML
WBC # BLD: 3.5 K/UL (ref 4.8–10.8)

## 2020-06-27 PROCEDURE — 85025 COMPLETE CBC W/AUTO DIFF WBC: CPT

## 2020-06-27 PROCEDURE — 80048 BASIC METABOLIC PNL TOTAL CA: CPT

## 2020-06-27 PROCEDURE — 6370000000 HC RX 637 (ALT 250 FOR IP): Performed by: HOSPITALIST

## 2020-06-27 PROCEDURE — 2580000003 HC RX 258: Performed by: INTERNAL MEDICINE

## 2020-06-27 PROCEDURE — 94660 CPAP INITIATION&MGMT: CPT

## 2020-06-27 PROCEDURE — 36415 COLL VENOUS BLD VENIPUNCTURE: CPT

## 2020-06-27 PROCEDURE — 8010000000 HC HEMODIALYSIS ACUTE INPT

## 2020-06-27 PROCEDURE — 6360000002 HC RX W HCPCS: Performed by: HOSPITALIST

## 2020-06-27 PROCEDURE — 93306 TTE W/DOPPLER COMPLETE: CPT

## 2020-06-27 PROCEDURE — 99232 SBSQ HOSP IP/OBS MODERATE 35: CPT | Performed by: INTERNAL MEDICINE

## 2020-06-27 PROCEDURE — 80202 ASSAY OF VANCOMYCIN: CPT

## 2020-06-27 PROCEDURE — 82947 ASSAY GLUCOSE BLOOD QUANT: CPT

## 2020-06-27 RX ADMIN — HYDRALAZINE HYDROCHLORIDE 100 MG: 50 TABLET, FILM COATED ORAL at 13:03

## 2020-06-27 RX ADMIN — HEPARIN SODIUM 5000 UNITS: 5000 INJECTION INTRAVENOUS; SUBCUTANEOUS at 13:03

## 2020-06-27 RX ADMIN — SEVELAMER CARBONATE 800 MG: 800 TABLET, FILM COATED ORAL at 07:58

## 2020-06-27 RX ADMIN — HYDROCHLOROTHIAZIDE 25 MG: 25 TABLET ORAL at 07:58

## 2020-06-27 RX ADMIN — DULOXETINE 60 MG: 60 CAPSULE, DELAYED RELEASE ORAL at 07:58

## 2020-06-27 RX ADMIN — SODIUM CHLORIDE, PRESERVATIVE FREE 10 ML: 5 INJECTION INTRAVENOUS at 08:35

## 2020-06-27 RX ADMIN — ATORVASTATIN CALCIUM 20 MG: 20 TABLET, FILM COATED ORAL at 07:58

## 2020-06-27 RX ADMIN — CLONIDINE HYDROCHLORIDE 0.3 MG: 0.1 TABLET ORAL at 06:20

## 2020-06-27 RX ADMIN — INSULIN LISPRO 3 UNITS: 100 INJECTION, SOLUTION INTRAVENOUS; SUBCUTANEOUS at 08:35

## 2020-06-27 RX ADMIN — LEVOTHYROXINE SODIUM 150 MCG: 75 TABLET ORAL at 06:20

## 2020-06-27 RX ADMIN — METOPROLOL SUCCINATE 100 MG: 50 TABLET, EXTENDED RELEASE ORAL at 06:20

## 2020-06-27 RX ADMIN — LISINOPRIL 20 MG: 20 TABLET ORAL at 07:58

## 2020-06-27 RX ADMIN — SEVELAMER CARBONATE 800 MG: 800 TABLET, FILM COATED ORAL at 13:03

## 2020-06-27 RX ADMIN — CLONIDINE HYDROCHLORIDE 0.3 MG: 0.1 TABLET ORAL at 13:03

## 2020-06-27 RX ADMIN — ISOSORBIDE MONONITRATE 120 MG: 60 TABLET, EXTENDED RELEASE ORAL at 07:58

## 2020-06-27 RX ADMIN — HYDRALAZINE HYDROCHLORIDE 100 MG: 50 TABLET, FILM COATED ORAL at 06:20

## 2020-06-27 ASSESSMENT — PAIN SCALES - GENERAL
PAINLEVEL_OUTOF10: 0

## 2020-06-27 NOTE — PROGRESS NOTES
Cardiology Daily Note Renee Ortiz MD      Patient:  Wm Keene  [id number]    Patient Active Problem List    Diagnosis Date Noted    Hypertensive emergency 06/24/2020     Priority: Low    Type 2 diabetes mellitus with hyperosmolar coma, with long-term current use of insulin ([address]      Priority: Low    Seizure ([address] 10/08/2019     Priority: Low    Palliative care patient 10/08/2019     Priority: Low    Hyperglycemic hyperosmolar nonketotic coma ([address]      Priority: Low    Accelerated hypertension 04/20/2019     Priority: Low    Medically noncompliant 04/20/2019     Priority: Low    Noncompliance with medications      Priority: Low    Left homonymous hemianopsia      Priority: Low    Acute intractable headache      Priority: Low    Volume overload 03/27/2019     Priority: Low    Hyponatremia 01/26/2019     Priority: Low    Hyperglycemia due to type 2 diabetes mellitus ([address] 01/26/2019     Priority: Low    Normocytic anemia 01/26/2019     Priority: Low    ESRD on hemodialysis ([address] 01/25/2019     Priority: Low    Steal syndrome as complication of dialysis access ([address] 12/19/2018     Priority: Low    PVD (peripheral vascular disease) ([address]      Priority: Low    High hepatic iron concentration determined by biopsy of liver 12/05/2018     Priority: Low    ESRD (end stage renal disease) on dialysis [facility])      Priority: Low    Abnormal CT of liver 08/15/2018     Priority: Low    Other ascites 08/15/2018     Priority: Low    Gastroparesis 08/08/2018     Priority: Low    Nausea and vomiting 08/08/2018     Priority: Low    Chronic constipation 08/08/2018     Priority: Low    Uncontrolled type 2 diabetes mellitus with complication, with long-term current use of insulin ([address] 08/08/2018     Priority: Low    Dialysis AV fistula malfunction ([address] 06/27/2018     Priority: Low    Acute respiratory failure with hypoxia and hypercapnia ([address] 03/07/2018     Priority: Low    Respiratory failure ([address]

## 2020-06-27 NOTE — DISCHARGE SUMMARY
Hospitalist   Discharge Summary    Adriana Murray  :  1970  MRN:  [id number]    Admit date:  2020  Discharge date:  2020    Admitting Physician:  Sarah Agosto MD    Advance Directive: Full Code    Consults: cardiology and nephrology    Primary Care Physician:  ROMINA Vinson    Discharge Diagnoses: Active Problems:    Hypertensive emergency  Resolved Problems:    * No resolved hospital problems. *      Significant Diagnostic Studies:   Xr Chest Portable    Result Date: 2020  EXAMINATION:  XR CHEST PORTABLE  2020 7:07 AM HISTORY: Shortness of air. COMPARISON: 2019. FINDINGS:  There are bilateral infiltrates in the mid and lower lung zones. There is mild blunting of the right costophrenic angle. There is mild cardiomegaly. There is a left subclavian and axillary vein venous stent. There is another partially imaged venous stent in the left arm. No acute bony abnormality is seen. 1. Bilateral infiltrates may represent pneumonia or pulmonary edema. There is minimal right pleural effusion. 2. Mild cardiomegaly. 3. Venous stents. Signed by Dr Garvin Proflane on 2020 7:08 AM      Labs:    CBC:   Recent Labs     206 20  0306   WBC 5.1 3.7* 3.5*   HGB 10.2* 9.5* 9.9*   * 94* 99*     BMP:    Recent Labs     2018 20  0216 20  0306    134* 132*   K 3.9 3.8  3.8 4.0   CL 94* 92* 94*   CO2 28 31* 29   BUN 29* 19 12   CREATININE 4.4* 3.6* 2.9*   GLUCOSE 149* 299* 268*     Hepatic:   Recent Labs     20  2320 20  0216   AST 30 17   ALT 26 17   BILITOT 0.4 0.3   ALKPHOS 128* 107*     Troponin:   Recent Labs     20  0618 20  1143 20  1700   TROPONINI 0.04* 0.04* 0.05*     BNP: No results for input(s): BNP in the last 72 hours. Lipids: No results for input(s): CHOL, HDL in the last 72 hours.     Invalid input(s): LDLCALCU  INR:   Recent Labs     20  2320   INR 1.07     ABG:   Recent Labs

## 2020-06-27 NOTE — PROGRESS NOTES
sodium chloride infusion   Intravenous Continuous Jamal Freeman MD   Stopped at 06/26/20 1613    sodium chloride flush 0.9 % injection 10 mL  10 mL Intravenous 2 times per day Jamal Freeman MD   10 mL at 06/27/20 0835    sodium chloride flush 0.9 % injection 10 mL  10 mL Intravenous PRN Jamal Freeman MD        acetaminophen (TYLENOL) tablet 650 mg  650 mg Oral Q4H PRN Jamal Freeman MD   650 mg at 06/26/20 1706    niCARdipine (CARDENE) 25 mg in dextrose 5 % 250 mL infusion  5 mg/hr Intravenous Continuous Jamal Freeman MD   Stopped at 06/27/20 0800    acetaminophen (TYLENOL) tablet 650 mg  650 mg Oral Q6H PRN Yessica Caruso MD   650 mg at 06/26/20 0260    Or    acetaminophen (TYLENOL) suppository 650 mg  650 mg Rectal Q6H PRN Yessica Caruso MD        ondansetron (ZOFRAN-ODT) disintegrating tablet 4 mg  4 mg Oral Q8H PRN Yessica Caruso MD        Or    ondansetron [facility]) injection 4 mg  4 mg Intravenous Q6H PRN Yessica Caruso MD   4 mg at 06/26/20 1937    heparin (porcine) injection 5,000 Units  5,000 Units Subcutaneous 3 times per day Yessica Caruso MD   Stopped at 06/27/20 6724    vancomycin (VANCOCIN) intermittent dosing (placeholder)   Other RX Placeholder Yessica Caruso MD        cloNIDine (CATAPRES) tablet 0.3 mg  0.3 mg Oral 3 times per day Yessica Caruso MD   0.3 mg at 06/27/20 0620    DULoxetine (CYMBALTA) extended release capsule 60 mg  60 mg Oral Daily Yessica Caruso MD   60 mg at 06/27/20 0758    hydroCHLOROthiazide (HYDRODIURIL) tablet 25 mg  25 mg Oral Daily Yessica Caruso MD   25 mg at 06/27/20 0758    insulin glargine (LANTUS) injection vial 15 Units  15 Units Subcutaneous Nightly Yessica Caruso MD   15 Units at 06/26/20 2054    isosorbide mononitrate (IMDUR) extended release tablet 120 mg  120 mg Oral Daily Yessica Caruso MD   120 mg at 06/27/20 0758    levothyroxine (SYNTHROID) tablet 150 mcg  150 mcg Oral Daily Yessica Caruso MD   150 mcg at 06/27/20 0620    lisinopril (PRINIVIL;ZESTRIL) tablet 20 mg  20 mg Oral BID Leslie Koehler MD   20 mg at 06/27/20 0758    metoprolol succinate (TOPROL XL) extended release tablet 100 mg  100 mg Oral QAM AC Leslie Koehler MD   100 mg at 06/27/20 3367    rOPINIRole (REQUIP) tablet 2 mg  2 mg Oral Nightly Leslie Koehler MD   2 mg at 06/26/20 2041    sevelamer (RENVELA) tablet 800 mg  800 mg Oral TID  Leslie Koehler MD   800 mg at 06/27/20 0758    atorvastatin (LIPITOR) tablet 20 mg  20 mg Oral Daily Leslie Koehler MD   20 mg at 06/27/20 0758    insulin lispro (HUMALOG) injection vial 0-6 Units  0-6 Units Subcutaneous TID  Leslie Koehler MD   3 Units at 06/27/20 0835    insulin lispro (HUMALOG) injection vial 0-3 Units  0-3 Units Subcutaneous Nightly Leslie Koehler MD   1 Units at 06/26/20 2056    glucose (GLUTOSE) 40 % oral gel 15 g  15 g Oral PRN Leslie Koehler MD        glucagon (rDNA) injection 1 mg  1 mg Intramuscular PRN Leslie Koehler MD        albuterol (PROVENTIL) nebulizer solution 2.5 mg  2.5 mg Nebulization Q1H PRN Leslie Koehler MD        nitroGLYCERIN 50 mg in dextrose 5% 250 mL infusion  5 mcg/min Intravenous Continuous Leslie Koehler MD   Stopped at 06/26/20 1655       Past Medical History:  Past Medical History:   Diagnosis Date    Arthritis     Chronic kidney disease, stage 5, kidney failure ([facility] [address]     Closed fracture of right shoulder girdle, with routine healing, subsequent encounter     DM (diabetes mellitus) type II controlled with renal manifestation ([facility] [address] 06/1993    Gastroparesis     GERD (gastroesophageal reflux disease)     Hemodialysis patient ([facility] [address]     dialysis on tues, thur, and sat [facility]    Hypertension     Hypothyroid     Nasal congestion     recent    Noncompliance with medications     Palliative care patient 10/08/2019    Restless leg syndrome        Past Surgical History:  Past Surgical History:   Procedure Laterality Date    BREAST SURGERY      infected milk gland removed, 2004    CHOLECYSTECTOMY,  at 60    Colon Polyps Mother     Lung Cancer Father          at 66    Colon Polyps Father     Stomach Cancer Sister     Breast Cancer Sister     Depression Daughter 25        committed suicide    Liver Cancer Neg Hx     Liver Disease Neg Hx     Esophageal Cancer Neg Hx     Rectal Cancer Neg Hx        Social History  Social History     Socioeconomic History    Marital status: Single     Spouse name: Not on file    Number of children: 2    Years of education: Not on file    Highest education level: Not on file   Occupational History    Not on file   Social Needs    Financial resource strain: Not on file    Food insecurity     Worry: Not on file     Inability: Not on file    Transportation needs     Medical: Not on file     Non-medical: Not on file   Tobacco Use    Smoking status: Current Every Day Smoker     Packs/day: 2.00     Years: 33.00     Pack years: 66.00     Types: Cigarettes    Smokeless tobacco: Never Used    Tobacco comment: NOW at 1/4 PD, started at age 24 and has averaged 2 PPD   Substance and Sexual Activity    Alcohol use: No    Drug use: Not Currently     Types: Marijuana    Sexual activity: Not Currently     Partners: Male   Lifestyle    Physical activity     Days per week: Not on file     Minutes per session: Not on file    Stress: Not on file   Relationships    Social connections     Talks on phone: Not on file     Gets together: Not on file     Attends Hoahaoism service: Not on file     Active member of club or organization: Not on file     Attends meetings of clubs or organizations: Not on file     Relationship status: Not on file    Intimate partner violence     Fear of current or ex partner: Not on file     Emotionally abused: Not on file     Physically abused: Not on file     Forced sexual activity: Not on file   Other Topics Concern    Not on file   Social History Narrative    Not on file         Review of Systems:  History obtained from chart review and the patient  General ROS: No fever or chills  Respiratory ROS: positive for - shortness of breath  Cardiovascular ROS: No chest pain or palpitations  Gastrointestinal ROS: No abdominal pain or melena  Genito-Urinary ROS: No dysuria or hematuria  Musculoskeletal ROS: No joint pain or swelling   14 point ROS reviewed with the patient and negative except as noted above and in the HPI unless unable to obtain.     Objective:  Patient Vitals for the past 24 hrs:   BP Temp Temp src Pulse Resp SpO2 Weight   06/27/20 1000 (!) 165/55 -- -- 54 13 98 % --   06/27/20 0902 (!) 162/53 -- -- 51 10 98 % --   06/27/20 0900 -- -- -- -- -- 98 % --   06/27/20 0800 (!) 140/64 98.2 °F (36.8 °C) Temporal 53 15 98 % --   06/27/20 0758 (!) 148/65 -- -- -- -- -- --   06/27/20 0700 (!) 140/47 -- -- 51 11 96 % --   06/27/20 0612 -- -- -- -- 12 95 % --   06/27/20 0600 (!) 142/49 -- -- 53 15 98 % --   06/27/20 0500 (!) 143/59 -- -- 53 14 97 % --   06/27/20 0400 (!) 144/55 98.5 °F (36.9 °C) Temporal 54 15 100 % 155 lb 1.6 oz (70.4 kg)   06/27/20 0300 (!) 154/59 -- -- 55 14 99 % --   06/27/20 0200 (!) 148/58 -- -- 56 9 98 % --   06/27/20 0100 (!) 143/54 -- -- 56 17 98 % --   06/27/20 0000 (!) 142/48 98 °F (36.7 °C) Temporal 58 18 100 % --   06/26/20 2300 (!) 144/58 -- -- 59 16 100 % --   06/26/20 2200 (!) 151/58 -- -- 59 16 96 % --   06/26/20 2145 (!) 162/56 -- -- 60 17 100 % --   06/26/20 2100 (!) 175/71 -- -- 67 15 95 % --   06/26/20 2045 -- 97.4 °F (36.3 °C) -- -- -- -- --   06/26/20 2015 (!) 255/165 -- -- 59 16 99 % --   06/26/20 2005 (!) 236/117 -- -- 58 22 98 % --   06/26/20 2000 (!) 131/54 97.4 °F (36.3 °C) Temporal 58 16 99 % --   06/26/20 1955 -- -- -- -- 10 95 % --   06/26/20 1936 (!) 153/92 -- -- 62 12 100 % --   06/26/20 1530 (!) 239/104 -- -- 62 -- -- --   06/26/20 1520 (!) 213/104 -- -- 63 -- -- --   06/26/20 1435 (!) 224/101 -- -- 65 16 100 % --   06/26/20 1430 (!) 216/109 98.5 °F (36.9 °C) Temporal 66 15 98 % --   06/26/20 1235 (!)

## 2020-06-27 NOTE — PROGRESS NOTES
Pharmacy Vancomycin Consult     Vancomycin Day: 2  Current Dosing: Vancomycin pulse dosing    Temp max:  99    Recent Labs     06/25/20  0618 06/26/20  0216   BUN 29* 19       Recent Labs     06/25/20  0618 06/26/20  0216   CREATININE 4.4* 3.6*       Recent Labs     06/24/20  2320 06/26/20  0216   WBC 5.1 3.7*         Intake/Output Summary (Last 24 hours) at 6/26/2020 2009  Last data filed at 6/26/2020 1700  Gross per 24 hour   Intake 438.05 ml   Output 200 ml   Net 238.05 ml       Culture Date Source Results   06/25/20 Blood x 2  No growth to date        Ht Readings from Last 1 Encounters:   06/24/20 5' 6\" (1.676 m)        Wt Readings from Last 1 Encounters:   06/26/20 156 lb 1.6 oz (70.8 kg)         Body mass index is 25.2 kg/m². Estimated Creatinine Clearance: 17 mL/min (A) (based on SCr of 3.6 mg/dL (H)). Random: 12.6    Assessment/Plan: Good level. Give Vancomycin 1000 mg IV x 1 dose now. Draw a random level after next dialysis.      Electronically signed by Jaron Leos, [facility] on 6/26/2020 at 8:09 PM

## 2020-06-27 NOTE — PROGRESS NOTES
Pharmacy Vancomycin Consult     Vancomycin Day: 2  Current Dosing: Vancomycin pulse dosing    Temp max:  99    Recent Labs     06/25/20  0618 06/26/20  0216   BUN 29* 19       Recent Labs     06/25/20  0618 06/26/20  0216   CREATININE 4.4* 3.6*       Recent Labs     06/24/20  2320 06/26/20  0216   WBC 5.1 3.7*         Intake/Output Summary (Last 24 hours) at 6/26/2020 2009  Last data filed at 6/26/2020 1700  Gross per 24 hour   Intake 438.05 ml   Output 200 ml   Net 238.05 ml       Culture Date Source Results   06/25/20 Blood x 2  No growth to date        Ht Readings from Last 1 Encounters:   06/24/20 5' 6\" (1.676 m)        Wt Readings from Last 1 Encounters:   06/26/20 156 lb 1.6 oz (70.8 kg)         Body mass index is 25.2 kg/m². Estimated Creatinine Clearance: 17 mL/min (A) (based on SCr of 3.6 mg/dL (H)). Random: 12.6    Assessment/Plan: Good level. Give Vancomycin 1000 mg IV x 1 dose now. Draw a random level after next dialysis.      Electronically signed by Olivia Diaz, [facility] on 6/26/2020 at 8:09 PM

## 2020-06-29 LAB
CATECHOLAMINE INTERPRETATION, PLASMA: ABNORMAL
DOPAMINE PLASMA: 37 PG/ML (ref 0–20)
EPINEPHRINE PLASMA: 20 PG/ML (ref 10–200)
NOREPINEPHRINE: 109 PG/ML (ref 80–520)

## 2020-06-30 LAB
BLOOD CULTURE, ROUTINE: NORMAL
CULTURE, BLOOD 2: NORMAL

## 2020-08-08 ENCOUNTER — HOSPITAL ENCOUNTER (EMERGENCY)
Age: 50
Discharge: HOME | End: 2020-08-08
Attending: EMERGENCY MEDICINE
Payer: MEDICARE

## 2020-08-08 ENCOUNTER — APPOINTMENT (OUTPATIENT)
Dept: CT IMAGING | Age: 50
End: 2020-08-08
Payer: MEDICARE

## 2020-08-08 VITALS
RESPIRATION RATE: 20 BRPM | HEART RATE: 68 BPM | HEIGHT: 66 IN | WEIGHT: 136.91 LBS | BODY MASS INDEX: 22 KG/M2 | SYSTOLIC BLOOD PRESSURE: 176 MMHG | DIASTOLIC BLOOD PRESSURE: 78 MMHG | OXYGEN SATURATION: 94 % | TEMPERATURE: 98.5 F

## 2020-08-08 LAB
ALBUMIN SERPL-MCNC: 4.2 G/DL (ref 3.5–5.2)
ALP BLD-CCNC: 108 U/L (ref 35–104)
ALT SERPL-CCNC: 11 U/L (ref 5–33)
ANION GAP SERPL CALCULATED.3IONS-SCNC: 19 MMOL/L (ref 7–19)
AST SERPL-CCNC: 19 U/L (ref 5–32)
BACTERIA: ABNORMAL /HPF
BASOPHILS ABSOLUTE: 0.1 K/UL (ref 0–0.2)
BASOPHILS RELATIVE PERCENT: 1.1 % (ref 0–1)
BILIRUB SERPL-MCNC: 0.5 MG/DL (ref 0.2–1.2)
BILIRUBIN URINE: NEGATIVE
BLOOD, URINE: NEGATIVE
BUN BLDV-MCNC: 35 MG/DL (ref 6–20)
CALCIUM SERPL-MCNC: 9.4 MG/DL (ref 8.6–10)
CHLORIDE BLD-SCNC: 91 MMOL/L (ref 98–111)
CLARITY: ABNORMAL
CO2: 26 MMOL/L (ref 22–29)
COLOR: YELLOW
CREAT SERPL-MCNC: 6 MG/DL (ref 0.5–0.9)
CRYSTALS, UA: ABNORMAL /HPF
EOSINOPHILS ABSOLUTE: 0.1 K/UL (ref 0–0.6)
EOSINOPHILS RELATIVE PERCENT: 1.2 % (ref 0–5)
EPITHELIAL CELLS, UA: 19 /HPF (ref 0–5)
GFR AFRICAN AMERICAN: 9
GFR NON-AFRICAN AMERICAN: 7
GLUCOSE BLD-MCNC: 324 MG/DL (ref 74–109)
GLUCOSE URINE: 500 MG/DL
HCT VFR BLD CALC: 37.5 % (ref 37–47)
HEMOGLOBIN: 12.6 G/DL (ref 12–16)
HYALINE CASTS: 5 /HPF (ref 0–8)
IMMATURE GRANULOCYTES #: 0 K/UL
KETONES, URINE: NEGATIVE MG/DL
LEUKOCYTE ESTERASE, URINE: ABNORMAL
LIPASE: 27 U/L (ref 13–60)
LYMPHOCYTES ABSOLUTE: 0.5 K/UL (ref 1.1–4.5)
LYMPHOCYTES RELATIVE PERCENT: 7.8 % (ref 20–40)
MCH RBC QN AUTO: 31.2 PG (ref 27–31)
MCHC RBC AUTO-ENTMCNC: 33.6 G/DL (ref 33–37)
MCV RBC AUTO: 92.8 FL (ref 81–99)
MONOCYTES ABSOLUTE: 0.6 K/UL (ref 0–0.9)
MONOCYTES RELATIVE PERCENT: 8.3 % (ref 0–10)
NEUTROPHILS ABSOLUTE: 5.4 K/UL (ref 1.5–7.5)
NEUTROPHILS RELATIVE PERCENT: 81.4 % (ref 50–65)
NITRITE, URINE: NEGATIVE
PDW BLD-RTO: 13.1 % (ref 11.5–14.5)
PH UA: 7.5 (ref 5–8)
PLATELET # BLD: 121 K/UL (ref 130–400)
PMV BLD AUTO: 9.8 FL (ref 9.4–12.3)
POTASSIUM SERPL-SCNC: 4.2 MMOL/L (ref 3.5–5)
PROTEIN UA: 300 MG/DL
RBC # BLD: 4.04 M/UL (ref 4.2–5.4)
RBC UA: 4 /HPF (ref 0–4)
SODIUM BLD-SCNC: 136 MMOL/L (ref 136–145)
SPECIFIC GRAVITY UA: 1.01 (ref 1–1.03)
TOTAL PROTEIN: 7 G/DL (ref 6.6–8.7)
UROBILINOGEN, URINE: 0.2 E.U./DL
WBC # BLD: 6.6 K/UL (ref 4.8–10.8)
WBC UA: 24 /HPF (ref 0–5)

## 2020-08-08 PROCEDURE — 83690 ASSAY OF LIPASE: CPT

## 2020-08-08 PROCEDURE — 99284 EMERGENCY DEPT VISIT MOD MDM: CPT

## 2020-08-08 PROCEDURE — 2580000003 HC RX 258: Performed by: EMERGENCY MEDICINE

## 2020-08-08 PROCEDURE — 6360000002 HC RX W HCPCS: Performed by: EMERGENCY MEDICINE

## 2020-08-08 PROCEDURE — 96375 TX/PRO/DX INJ NEW DRUG ADDON: CPT

## 2020-08-08 PROCEDURE — 81001 URINALYSIS AUTO W/SCOPE: CPT

## 2020-08-08 PROCEDURE — 74177 CT ABD & PELVIS W/CONTRAST: CPT

## 2020-08-08 PROCEDURE — 85025 COMPLETE CBC W/AUTO DIFF WBC: CPT

## 2020-08-08 PROCEDURE — 96376 TX/PRO/DX INJ SAME DRUG ADON: CPT

## 2020-08-08 PROCEDURE — 80053 COMPREHEN METABOLIC PANEL: CPT

## 2020-08-08 PROCEDURE — 6360000004 HC RX CONTRAST MEDICATION: Performed by: EMERGENCY MEDICINE

## 2020-08-08 PROCEDURE — 87086 URINE CULTURE/COLONY COUNT: CPT

## 2020-08-08 PROCEDURE — 96365 THER/PROPH/DIAG IV INF INIT: CPT

## 2020-08-08 PROCEDURE — 36415 COLL VENOUS BLD VENIPUNCTURE: CPT

## 2020-08-08 RX ORDER — ONDANSETRON 4 MG/1
4 TABLET, ORALLY DISINTEGRATING ORAL EVERY 8 HOURS PRN
Qty: 30 TABLET | Refills: 0 | Status: SHIPPED | OUTPATIENT
Start: 2020-08-08 | End: 2020-08-17 | Stop reason: ALTCHOICE

## 2020-08-08 RX ORDER — HYDROCODONE BITARTRATE AND ACETAMINOPHEN 5; 325 MG/1; MG/1
1 TABLET ORAL EVERY 6 HOURS PRN
Qty: 10 TABLET | Refills: 0 | Status: SHIPPED | OUTPATIENT
Start: 2020-08-08 | End: 2020-08-11

## 2020-08-08 RX ORDER — MORPHINE SULFATE 4 MG/ML
4 INJECTION, SOLUTION INTRAMUSCULAR; INTRAVENOUS ONCE
Status: COMPLETED | OUTPATIENT
Start: 2020-08-08 | End: 2020-08-08

## 2020-08-08 RX ORDER — ONDANSETRON 2 MG/ML
4 INJECTION INTRAMUSCULAR; INTRAVENOUS ONCE
Status: COMPLETED | OUTPATIENT
Start: 2020-08-08 | End: 2020-08-08

## 2020-08-08 RX ADMIN — MORPHINE SULFATE 4 MG: 4 INJECTION, SOLUTION INTRAMUSCULAR; INTRAVENOUS at 08:51

## 2020-08-08 RX ADMIN — IOPAMIDOL 90 ML: 755 INJECTION, SOLUTION INTRAVENOUS at 06:52

## 2020-08-08 RX ADMIN — MORPHINE SULFATE 4 MG: 4 INJECTION, SOLUTION INTRAMUSCULAR; INTRAVENOUS at 07:04

## 2020-08-08 RX ADMIN — CEFTRIAXONE 1 G: 1 INJECTION, POWDER, FOR SOLUTION INTRAMUSCULAR; INTRAVENOUS at 10:59

## 2020-08-08 RX ADMIN — ONDANSETRON 4 MG: 2 INJECTION INTRAMUSCULAR; INTRAVENOUS at 07:04

## 2020-08-08 ASSESSMENT — PAIN DESCRIPTION - DESCRIPTORS: DESCRIPTORS: BURNING;THROBBING;STABBING

## 2020-08-08 ASSESSMENT — ENCOUNTER SYMPTOMS
ABDOMINAL PAIN: 1
VOMITING: 1
BACK PAIN: 0
NAUSEA: 1
SHORTNESS OF BREATH: 0

## 2020-08-08 ASSESSMENT — PAIN SCALES - GENERAL
PAINLEVEL_OUTOF10: 9
PAINLEVEL_OUTOF10: 7
PAINLEVEL_OUTOF10: 7

## 2020-08-08 ASSESSMENT — PAIN DESCRIPTION - LOCATION: LOCATION: ABDOMEN

## 2020-08-08 ASSESSMENT — PAIN DESCRIPTION - ORIENTATION: ORIENTATION: RIGHT;LOWER

## 2020-08-08 NOTE — ED NOTES
Bed: 07  Expected date:   Expected time:   Means of arrival: [facility]  Comments:  EMS: roselia Chen RN  08/08/20 7393

## 2020-08-08 NOTE — ED PROVIDER NOTES
Hypertension     Hypothyroid     Nasal congestion     recent    Noncompliance with medications     Palliative care patient 10/08/2019    Restless leg syndrome          SURGICAL HISTORY       Past Surgical History:   Procedure Laterality Date    BREAST SURGERY      infected milk gland removed, 2004    CHOLECYSTECTOMY, LAPAROSCOPIC      2008    DIALYSIS FISTULA CREATION Left 1/25/2019    REVISION LEFT UPPER EXTREMITY AV ACCESS WITH LEFT BRACHIAL ARTERY TO LEFT AXILLARY VEIN WITH  INTERPOSITIONAL ARTEGRAFT   performed by Ludmila Hannah MD at [address]  2012    [address] Right 1/25/2019    INSERTION OF RIGHT INTERNAL JUGULAR HEMODIALYSIS CATHETER performed by Ludmila Hannah MD at [address]      pt denies hyst; states ablation    TX COLONOSCOPY W/BIOPSY SINGLE/MULTIPLE N/A 10/20/2017    Dr Shira Key prep-Tubular AP (-) dysplasia x 2--3 yr recall    TX EGD TRANSORAL BIOPSY SINGLE/MULTIPLE N/A 12/27/2016    Dr Nessa Sandy EGD TRANSORAL BIOPSY SINGLE/MULTIPLE N/A 10/20/2017    Dr Max Vazquez (-)    TUBAL LIGATION      1993    VASCULAR SURGERY Left 2016    fistula by Dr Leonie Hardin at [address]  10/02/2017    [facility]. Left upper fistulograms/venograms, balloon angioplasty cephalic vein arch [id number] conquest.    VASCULAR SURGERY  08/2018    FISTULOGRAM    VASCULAR SURGERY  10/29/2018    [facility]. Left upper fistulograms/venograms    VASCULAR SURGERY  12/19/2018    [facility]. Arch aortogram,left upper arteriograms.  VASCULAR SURGERY  03/06/2019    [facility]. Removal of tunneled dialyis catheter right internal jugular vein.  VASCULAR SURGERY  08/28/2019    [facility]. Left upper extremity fistulogram including venography to the superior vena cava. Balloon angioplasty left subclavian vein with 10mm x 40mm conquest balloon. Balloon angioplasty mid/proximal upper arm cephalic vein with 35HR x 40mm conquest balloon. Venograms after balloon angioplasty. Stent left mid/proximal upper arm cephalic vein stenosis fluency 10mm x 60mm self-expanding covered stent. Balloon     VASCULAR SURGERY  08/28/2019    cont angioplasty stent with 10mm x 40mm conquest balloon. Completion venograms left upper extremity. CURRENT MEDICATIONS       Discharge Medication List as of 8/8/2020 11:31 AM      CONTINUE these medications which have NOT CHANGED    Details   cloNIDine (CATAPRES) 0.3 MG tablet Take 1 tablet by mouth every 8 hours, Disp-60 tablet, R-3Normal      hydrochlorothiazide (HYDRODIURIL) 25 MG tablet Take 1 tablet by mouth daily, Disp-30 tablet, R-3Normal      sevelamer (RENVELA) 800 MG tablet Take 1 tablet by mouth 3 times daily (with meals)Historical Med      lisinopril (PRINIVIL;ZESTRIL) 20 MG tablet Take 1 tablet by mouth 2 times daily, Disp-30 tablet, R-0Normal      metoprolol succinate (TOPROL XL) 100 MG extended release tablet Take 1 tablet by mouth every morning (before breakfast), Disp-30 tablet, R-0Normal      hydrALAZINE (APRESOLINE) 50 MG tablet Take 1 tablet by mouth every 8 hours, Disp-90 tablet, R-0Normal      insulin detemir (LEVEMIR) 100 UNIT/ML injection vial Indications: Diabetes Through insulin pump. Historical Med      albuterol sulfate  (90 Base) MCG/ACT inhaler Inhale 2 puffs into the lungs every 4 hours as neededHistorical Med      levothyroxine (SYNTHROID) 150 MCG tablet Take 150 mcg by mouth DailyHistorical Med      NIFEdipine (ADALAT CC) 60 MG extended release tablet Take 60 mg by mouth dailyHistorical Med      isosorbide mononitrate (IMDUR) 120 MG extended release tablet Take 1 tablet by mouth daily, Disp-30 tablet, R-0Normal      ondansetron (ZOFRAN) 4 MG tablet Take 4 mg by mouth every 8 hours as needed for Nausea or VomitingHistorical Med      DULoxetine (CYMBALTA) 60 MG extended release capsule Take 60 mg by mouth dailyHistorical Med      rOPINIRole (REQUIP) 2 MG tablet Take 2 mg by mouth nightly Historical Med      simvastatin (ZOCOR) 40 MG tablet Take 40 mg by mouth nightly Historical Med      insulin aspart (NOVOLOG FLEXPEN) 100 UNIT/ML injection pen Inject 5 Units into the skin 3 times daily Historical Med             ALLERGIES     Penicillins;  Lamisil advanced [terbinafine]; and Stadol [butorphanol]    FAMILY HISTORY       Family History   Problem Relation Age of Onset    Colon Cancer Mother          at 63    Colon Polyps Mother     Lung Cancer Father          at 66    Colon Polyps Father     Stomach Cancer Sister     Breast Cancer Sister     Depression Daughter 25        committed suicide    Liver Cancer Neg Hx     Liver Disease Neg Hx     Esophageal Cancer Neg Hx     Rectal Cancer Neg Hx           SOCIAL HISTORY       Social History     Socioeconomic History    Marital status: Single     Spouse name: None    Number of children: 2    Years of education: None    Highest education level: None   Occupational History    None   Social Needs    Financial resource strain: None    Food insecurity     Worry: None     Inability: None    Transportation needs     Medical: None     Non-medical: None   Tobacco Use    Smoking status: Current Every Day Smoker     Packs/day: 2.00     Years: 33.00     Pack years: 66.00     Types: Cigarettes    Smokeless tobacco: Never Used    Tobacco comment: NOW at 1/4 PD, started at age 24 and has averaged 2 PPD   Substance and Sexual Activity    Alcohol use: No    Drug use: Not Currently     Types: Marijuana    Sexual activity: Not Currently     Partners: Male   Lifestyle    Physical activity     Days per week: None     Minutes per session: None    Stress: None   Relationships    Social connections     Talks on phone: None     Gets together: None     Attends Christian service: None     Active member of club or organization: None     Attends meetings of clubs or organizations: None     Relationship status: None    Intimate partner violence     Fear of current or ex partner: None     Emotionally abused: such as CT, Ultrasound and MRI are read by the radiologist. Eleni Becerra images are visualized and preliminarily interpreted by the emergency physician with the below findings:        Interpretation per the Radiologist below, if available at the time of this note:    CT ABDOMEN PELVIS W IV CONTRAST Additional Contrast? None   Final Result   1. Enhancement of the gastric lining with low density of the gastric   wall, which does not appear markedly thickened. Gastritis would be a   consideration. 2. There is a 5 mm round low density at the pancreatic head/neck   junction. Differential diagnosis includes invaginating fat, pseudocyst   or intraductal papillary mucinous neoplasm (IPMN). Consider   nonemergent MRI abdomen without and with contrast (pancreas protocol)   for follow-up. 3. Bilateral renal atrophy. 4. Calcified aortic atherosclerosis. 5. Diffuse body wall edema.    Signed by Dr Pope Going on 8/8/2020 7:40 AM            ED BEDSIDE ULTRASOUND:   Performed by ED Physician - none    LABS:  Labs Reviewed   CBC WITH AUTO DIFFERENTIAL - Abnormal; Notable for the following components:       Result Value    RBC 4.04 (*)     MCH 31.2 (*)     Platelets 743 (*)     Neutrophils % 81.4 (*)     Lymphocytes % 7.8 (*)     Basophils % 1.1 (*)     Lymphocytes Absolute 0.5 (*)     All other components within normal limits   COMPREHENSIVE METABOLIC PANEL - Abnormal; Notable for the following components:    Chloride 91 (*)     Glucose 324 (*)     BUN 35 (*)     CREATININE 6.0 (*)     GFR Non- 7 (*)     GFR African American 9 (*)     Alkaline Phosphatase 108 (*)     All other components within normal limits   URINE RT REFLEX TO CULTURE - Abnormal; Notable for the following components:    Clarity, UA CLOUDY (*)     Glucose, Ur 500 (*)     Leukocyte Esterase, Urine SMALL (*)     All other components within normal limits   MICROSCOPIC URINALYSIS - Abnormal; Notable for the following components: Bacteria, UA 1+ (*)     Crystals, UA NEG (*)     WBC, UA 24 (*)     All other components within normal limits   CULTURE, URINE   LIPASE       All other labs were within normal range or not returned as of this dictation. EMERGENCY DEPARTMENT COURSE and DIFFERENTIALDIAGNOSIS/MDM:   Vitals:    Vitals:    08/08/20 0459 08/08/20 0900 08/08/20 1200   BP: (!) 159/83 (!) 178/88 (!) 176/78   Pulse: 72  68   Resp: 18  20   Temp: 98.5 °F (36.9 °C)     TempSrc: Oral     SpO2: 99% 91% 94%   Weight: 136 lb 14.5 oz (62.1 kg)     Height: 5' 6\" (1.676 m)         MDM  Number of Diagnoses or Management Options  ESRD on hemodialysis ([facility] [address]:   Non-intractable vomiting with nausea, unspecified vomiting type:   Diagnosis management comments: Patient CT scan was negative for acute appendicitis it was normal on the scan. There is really nothing acute she may have a little gastritis however the patient had nonbloody emesis as she generally hurt there either. Patient stable for discharge observe her for many hours she no longer threw up. I gave her medications. She felt better. She may have a slight UTI give her some medication. She did develop some petechiae in her right upper extremity distal to where the blood pressure cuff with squeezing. Her platelets are chronically slightly low. The patient did not have anaphylaxis or urticaria to the medication. Patient is stable for discharge I arrange for her to go to hemodialysis today. She was no longer throwing up. She was back at her baseline. We got her a taxicab to take her to her dialysis treatment. And arranged a new time. Amount and/or Complexity of Data Reviewed  Clinical lab tests: reviewed and ordered  Tests in the radiology section of CPT®: reviewed and ordered    Patient Progress  Patient progress: improved        CONSULTS:  None    PROCEDURES:  Unless otherwise notedbelow, none     Procedures    FINAL IMPRESSION     1.  Non-intractable vomiting with nausea, unspecified vomiting type    2. ESRD on hemodialysis [facility])          DISPOSITION/PLAN   DISPOSITION Decision To Discharge 08/08/2020 11:29:18 AM      PATIENT REFERRED TO:  dialysis today            DISCHARGE MEDICATIONS:  Discharge Medication List as of 8/8/2020 11:31 AM      START taking these medications    Details   ondansetron (ZOFRAN ODT) 4 MG disintegrating tablet Take 1 tablet by mouth every 8 hours as needed for Nausea or Vomiting, Disp-30 tablet,R-0Print      HYDROcodone-acetaminophen (NORCO) 5-325 MG per tablet Take 1 tablet by mouth every 6 hours as needed for Pain for up to 3 days. Intended supply: 3 days. Take lowest dose possible to manage pain, Disp-10 tablet,R-0Print           Attestation: The Prescription Monitoring Report for this patient was reviewed today. Irene Malloy [id number]) Igor Mccauley MD)  Periodic Controlled Substance Monitoring: Possible medication side effects, risk of tolerance/dependence & alternative treatments discussed., No signs of potential drug abuse or diversion identified.  Igor Mccauley MD)    (Please note that portions of this note were completed with a voice recognition program.  Efforts were made to edit the dictations butoccasionally words are mis-transcribed.)    Igor Mccauley MD (electronically signed)  AttendingEmergency Physician          Igor Mccauley MD  08/08/20 7590

## 2020-08-10 ENCOUNTER — CARE COORDINATION (OUTPATIENT)
Dept: CARE COORDINATION | Age: 50
End: 2020-08-10

## 2020-08-10 LAB
ORGANISM: ABNORMAL
URINE CULTURE, ROUTINE: ABNORMAL
URINE CULTURE, ROUTINE: ABNORMAL

## 2020-08-10 NOTE — CARE COORDINATION
Patient contacted regarding recent discharge and COVID-19 risk. Discussed COVID-19 related testing which was not done at this time. Test results were not done. Patient informed of results, if available?      Care Transition Nurse/ Ambulatory Care Manager contacted the patient by telephone to perform post discharge assessment. Verified name and  with patient as identifiers. Patient has following risk factors of: diabetes and chronic kidney disease. CTN/ACM reviewed discharge instructions, medical action plan and red flags related to discharge diagnosis. Reviewed and educated them on any new and changed medications related to discharge diagnosis. Advised obtaining a 90-day supply of all daily and as-needed medications. Ted Calderon said she does not feel much better today. She is using the Zofran but has continues to vomit yesterday and today. In addition, pt stated she has not had a BM for about 5 days. Normal routine is every other day. Pt said that despite dialysis, she usually urinates about twice a day and urine is pale and clear. She has a 32 ounce daily fluid intake limit. Pt has not contacted her PCP office or her GI. ACM assisted to schedule pt an appt with her PCP for Thursday at 1:15 pm. Pt has an appt with [facility] on Wednesday at 10:10 am. ACM suggested pt utilize popsicles, jello and small amounts of Gatorade or Pedialyte to stay hydrated. Pt was encouraged to monitor her symptoms and to return to the ED if she becomes severely weakened, SOA or unable to perform routine activities of daily living for herself. Pt voiced understanding. ACM will follow up with patient next week. Education provided regarding infection prevention, and signs and symptoms of COVID-19 and when to seek medical attention with patient who verbalized understanding.  Discussed exposure protocols and quarantine from [address] you at higher risk for severe illness  and given an opportunity for questions and concerns. The patient agrees to contact the COVID-19 hotline 503-518-1909 or PCP office for questions related to their healthcare. CTN/ACM provided contact information for future reference. From CDC: Are you at higher risk for severe illness?  Wash your hands often.  Avoid close contact (6 feet, which is about two arm lengths) with people who are sick.  Put distance between yourself and other people if COVID-19 is spreading in your community.  Clean and disinfect frequently touched surfaces.  Avoid all cruise travel and non-essential air travel.  Call your healthcare professional if you have concerns about COVID-19 and your underlying condition or if you are sick. For more information on steps you can take to protect yourself, see CDC's How to [address] [place] [address] for follow-up call in 5-7 days based on severity of symptoms and risk factors.

## 2020-08-11 ENCOUNTER — APPOINTMENT (OUTPATIENT)
Dept: CT IMAGING | Age: 50
End: 2020-08-11
Payer: MEDICARE

## 2020-08-11 ENCOUNTER — HOSPITAL ENCOUNTER (EMERGENCY)
Age: 50
Discharge: HOME | End: 2020-08-12
Attending: EMERGENCY MEDICINE
Payer: MEDICARE

## 2020-08-11 LAB
ALBUMIN SERPL-MCNC: 3.9 G/DL (ref 3.5–5.2)
ALP BLD-CCNC: 95 U/L (ref 35–104)
ALT SERPL-CCNC: 12 U/L (ref 5–33)
ANION GAP SERPL CALCULATED.3IONS-SCNC: 14 MMOL/L (ref 7–19)
AST SERPL-CCNC: 23 U/L (ref 5–32)
BASOPHILS ABSOLUTE: 0.1 K/UL (ref 0–0.2)
BASOPHILS RELATIVE PERCENT: 1.6 % (ref 0–1)
BILIRUB SERPL-MCNC: 0.4 MG/DL (ref 0.2–1.2)
BUN BLDV-MCNC: 12 MG/DL (ref 6–20)
CALCIUM SERPL-MCNC: 9.1 MG/DL (ref 8.6–10)
CHLORIDE BLD-SCNC: 88 MMOL/L (ref 98–111)
CO2: 33 MMOL/L (ref 22–29)
CREAT SERPL-MCNC: 3.7 MG/DL (ref 0.5–0.9)
EOSINOPHILS ABSOLUTE: 0.1 K/UL (ref 0–0.6)
EOSINOPHILS RELATIVE PERCENT: 1.6 % (ref 0–5)
GFR AFRICAN AMERICAN: 16
GFR NON-AFRICAN AMERICAN: 13
GLUCOSE BLD-MCNC: 134 MG/DL
GLUCOSE BLD-MCNC: 134 MG/DL (ref 70–99)
GLUCOSE BLD-MCNC: 139 MG/DL (ref 74–109)
HCT VFR BLD CALC: 39.7 % (ref 37–47)
HEMOGLOBIN: 13.4 G/DL (ref 12–16)
IMMATURE GRANULOCYTES #: 0 K/UL
LIPASE: 16 U/L (ref 13–60)
LYMPHOCYTES ABSOLUTE: 0.9 K/UL (ref 1.1–4.5)
LYMPHOCYTES RELATIVE PERCENT: 16.9 % (ref 20–40)
MAGNESIUM: 2 MG/DL (ref 1.6–2.6)
MCH RBC QN AUTO: 30.9 PG (ref 27–31)
MCHC RBC AUTO-ENTMCNC: 33.8 G/DL (ref 33–37)
MCV RBC AUTO: 91.5 FL (ref 81–99)
MONOCYTES ABSOLUTE: 0.5 K/UL (ref 0–0.9)
MONOCYTES RELATIVE PERCENT: 9.6 % (ref 0–10)
NEUTROPHILS ABSOLUTE: 3.6 K/UL (ref 1.5–7.5)
NEUTROPHILS RELATIVE PERCENT: 70.1 % (ref 50–65)
PDW BLD-RTO: 13 % (ref 11.5–14.5)
PERFORMED ON: ABNORMAL
PLATELET # BLD: 108 K/UL (ref 130–400)
PMV BLD AUTO: 10.1 FL (ref 9.4–12.3)
POTASSIUM REFLEX MAGNESIUM: 3.4 MMOL/L (ref 3.5–5)
RBC # BLD: 4.34 M/UL (ref 4.2–5.4)
SODIUM BLD-SCNC: 135 MMOL/L (ref 136–145)
TOTAL PROTEIN: 7 G/DL (ref 6.6–8.7)
WBC # BLD: 5.1 K/UL (ref 4.8–10.8)

## 2020-08-11 PROCEDURE — 83690 ASSAY OF LIPASE: CPT

## 2020-08-11 PROCEDURE — 80053 COMPREHEN METABOLIC PANEL: CPT

## 2020-08-11 PROCEDURE — 82947 ASSAY GLUCOSE BLOOD QUANT: CPT

## 2020-08-11 PROCEDURE — 83735 ASSAY OF MAGNESIUM: CPT

## 2020-08-11 PROCEDURE — 96375 TX/PRO/DX INJ NEW DRUG ADDON: CPT

## 2020-08-11 PROCEDURE — 6360000002 HC RX W HCPCS: Performed by: EMERGENCY MEDICINE

## 2020-08-11 PROCEDURE — 74177 CT ABD & PELVIS W/CONTRAST: CPT

## 2020-08-11 PROCEDURE — 36415 COLL VENOUS BLD VENIPUNCTURE: CPT

## 2020-08-11 PROCEDURE — 6360000004 HC RX CONTRAST MEDICATION: Performed by: EMERGENCY MEDICINE

## 2020-08-11 PROCEDURE — 96374 THER/PROPH/DIAG INJ IV PUSH: CPT

## 2020-08-11 PROCEDURE — 96376 TX/PRO/DX INJ SAME DRUG ADON: CPT

## 2020-08-11 PROCEDURE — 85025 COMPLETE CBC W/AUTO DIFF WBC: CPT

## 2020-08-11 PROCEDURE — 99999 PR OFFICE/OUTPT VISIT,PROCEDURE ONLY: CPT | Performed by: EMERGENCY MEDICINE

## 2020-08-11 PROCEDURE — 99285 EMERGENCY DEPT VISIT HI MDM: CPT

## 2020-08-11 RX ORDER — HALOPERIDOL 5 MG/ML
5 INJECTION INTRAMUSCULAR ONCE
Status: COMPLETED | OUTPATIENT
Start: 2020-08-11 | End: 2020-08-12

## 2020-08-11 RX ORDER — METOCLOPRAMIDE HYDROCHLORIDE 5 MG/ML
10 INJECTION INTRAMUSCULAR; INTRAVENOUS ONCE
Status: COMPLETED | OUTPATIENT
Start: 2020-08-11 | End: 2020-08-11

## 2020-08-11 RX ORDER — PROMETHAZINE HYDROCHLORIDE 25 MG/ML
12.5 INJECTION, SOLUTION INTRAMUSCULAR; INTRAVENOUS ONCE
Status: COMPLETED | OUTPATIENT
Start: 2020-08-11 | End: 2020-08-12

## 2020-08-11 RX ORDER — PROMETHAZINE HYDROCHLORIDE 25 MG/ML
12.5 INJECTION, SOLUTION INTRAMUSCULAR; INTRAVENOUS ONCE
Status: COMPLETED | OUTPATIENT
Start: 2020-08-11 | End: 2020-08-11

## 2020-08-11 RX ADMIN — IOPAMIDOL 90 ML: 755 INJECTION, SOLUTION INTRAVENOUS at 23:34

## 2020-08-11 RX ADMIN — METOCLOPRAMIDE 10 MG: 5 INJECTION, SOLUTION INTRAMUSCULAR; INTRAVENOUS at 22:24

## 2020-08-11 RX ADMIN — PROMETHAZINE HYDROCHLORIDE 12.5 MG: 25 INJECTION INTRAMUSCULAR; INTRAVENOUS at 22:24

## 2020-08-11 ASSESSMENT — ENCOUNTER SYMPTOMS
NAUSEA: 1
EYE REDNESS: 0
RHINORRHEA: 0
VOICE CHANGE: 0
VOMITING: 1
ABDOMINAL PAIN: 1
DIARRHEA: 0
EYE PAIN: 0
COUGH: 0
SHORTNESS OF BREATH: 0

## 2020-08-11 ASSESSMENT — PAIN SCALES - GENERAL: PAINLEVEL_OUTOF10: 8

## 2020-08-12 VITALS
SYSTOLIC BLOOD PRESSURE: 178 MMHG | BODY MASS INDEX: 21.51 KG/M2 | HEIGHT: 66 IN | OXYGEN SATURATION: 98 % | TEMPERATURE: 98.9 F | DIASTOLIC BLOOD PRESSURE: 84 MMHG | RESPIRATION RATE: 20 BRPM | HEART RATE: 72 BPM | WEIGHT: 133.82 LBS

## 2020-08-12 LAB
BACTERIA: ABNORMAL /HPF
BILIRUBIN URINE: NEGATIVE
BLOOD, URINE: NEGATIVE
CLARITY: ABNORMAL
COLOR: YELLOW
EPITHELIAL CELLS, UA: ABNORMAL /HPF
GLUCOSE URINE: 250 MG/DL
KETONES, URINE: NEGATIVE MG/DL
LEUKOCYTE ESTERASE, URINE: ABNORMAL
NITRITE, URINE: NEGATIVE
PH UA: 7.5 (ref 5–8)
PROTEIN UA: 300 MG/DL
SPECIFIC GRAVITY UA: 1.02 (ref 1–1.03)
UROBILINOGEN, URINE: 0.2 E.U./DL
WBC UA: ABNORMAL /HPF (ref 0–5)
YEAST: PRESENT /HPF

## 2020-08-12 PROCEDURE — 6360000002 HC RX W HCPCS: Performed by: EMERGENCY MEDICINE

## 2020-08-12 PROCEDURE — 81001 URINALYSIS AUTO W/SCOPE: CPT

## 2020-08-12 PROCEDURE — 87086 URINE CULTURE/COLONY COUNT: CPT

## 2020-08-12 PROCEDURE — 6370000000 HC RX 637 (ALT 250 FOR IP): Performed by: EMERGENCY MEDICINE

## 2020-08-12 RX ORDER — PROMETHAZINE HYDROCHLORIDE 25 MG/1
25 TABLET ORAL EVERY 6 HOURS PRN
Qty: 12 TABLET | Refills: 0 | Status: SHIPPED | OUTPATIENT
Start: 2020-08-12 | End: 2020-08-19

## 2020-08-12 RX ADMIN — HALOPERIDOL LACTATE 5 MG: 5 INJECTION INTRAMUSCULAR at 00:00

## 2020-08-12 RX ADMIN — PROMETHAZINE HYDROCHLORIDE 12.5 MG: 25 INJECTION INTRAMUSCULAR; INTRAVENOUS at 01:29

## 2020-08-12 RX ADMIN — LIDOCAINE HYDROCHLORIDE: 20 SOLUTION ORAL; TOPICAL at 01:28

## 2020-08-12 NOTE — ED PROVIDER NOTES
[facility]  EMERGENCY DEPARTMENT ENCOUNTER      Pt Name: Dyan Leger  MRN: [id number]  [place] 1970  Date of evaluation: 8/11/2020  Provider: Shirley Martinez MD    CHIEF COMPLAINT       Chief Complaint   Patient presents with    Emesis     pt presents to ED with c/o nausea and vomiting x 7 days. HISTORY OF PRESENT ILLNESS   (Location/Symptom, Timing/Onset,Context/Setting, Quality, Duration, Modifying Factors, Severity)  Note limiting factors. Dyan Leger is a 45 y.o. female who presents to the emergency department with complaint of generalized abdominal pain associated with persistent nausea and vomiting over the last week with constipation. Reports a history of gastroparesis. Patient has end-stage renal disease and is on dialysis secondary to history of well-controlled type 2 diabetes. States that he discussed kidney and pancreatic transplant with her in the past.  Last dialysis session was today. States symptoms are worse than what she is experienced with gastroparesis in the past.  States she has been taking Zofran but it never works and she needs Phenergan. Patient was seen here 3 days ago for the same symptoms. CT at that time showed no acute findings but did show a low density at the pancreatic head. HPI    NursingNotes were reviewed. REVIEW OF SYSTEMS    (2-9 systems for level 4, 10 or more for level 5)     Review of Systems   Constitutional: Negative for fatigue and fever. HENT: Negative for congestion, rhinorrhea and voice change. Eyes: Negative for pain and redness. Respiratory: Negative for cough and shortness of breath. Cardiovascular: Negative for chest pain. Gastrointestinal: Positive for abdominal pain, nausea and vomiting. Negative for diarrhea. Endocrine: Negative. Genitourinary: Negative. Musculoskeletal: Negative for arthralgias and gait problem. Skin: Negative for rash and wound. Neurological: Negative for weakness and headaches. Hematological: Negative. Psychiatric/Behavioral: Negative. All other systems reviewed and are negative. A complete review of systems was performed and is negative except as noted above in the HPI. PAST MEDICAL HISTORY     Past Medical History:   Diagnosis Date    Arthritis     Chronic kidney disease, stage 5, kidney failure [address]     Closed fracture of right shoulder girdle, with routine healing, subsequent encounter     DM (diabetes mellitus) type II controlled with renal manifestation ([address] 06/1993    Gastroparesis     GERD (gastroesophageal reflux disease)     Hemodialysis patient ([address]     dialysis on tues, thur, and sat [facility]    Hypertension     Hypothyroid     Nasal congestion     recent    Noncompliance with medications     Palliative care patient 10/08/2019    Restless leg syndrome          SURGICAL HISTORY       Past Surgical History:   Procedure Laterality Date    BREAST SURGERY      infected milk gland removed, 2004    CHOLECYSTECTOMY, LAPAROSCOPIC      2008    DIALYSIS FISTULA CREATION Left 1/25/2019    REVISION LEFT UPPER EXTREMITY AV ACCESS WITH LEFT BRACHIAL ARTERY TO LEFT AXILLARY VEIN WITH  INTERPOSITIONAL ARTEGRAFT   performed by Stone Kaye MD at [facility]  2012    [address] Right 1/25/2019    INSERTION OF RIGHT INTERNAL JUGULAR HEMODIALYSIS CATHETER performed by [facility] at [facility]      pt denies hyst; states ablation    SD COLONOSCOPY W/BIOPSY SINGLE/MULTIPLE N/A 10/20/2017    Dr Twin Bee prep-Tubular AP (-) dysplasia x 2--3 yr recall    SD EGD TRANSORAL BIOPSY SINGLE/MULTIPLE N/A 12/27/2016    Dr Delon Blanton EGD TRANSORAL BIOPSY SINGLE/MULTIPLE N/A 10/20/2017    Dr Gabriel Chirinos (-)    TUBAL LIGATION      1993    VASCULAR SURGERY Left 2016    fistula by Dr Mary Ann Woods at [address]  10/02/2017    [facility]. Left upper fistulograms/venograms, balloon angioplasty cephalic vein arch [id number] conquest.    VASCULAR SURGERY  08/2018    FISTULOGRAM    VASCULAR SURGERY  10/29/2018    [facility]. Left upper fistulograms/venograms    VASCULAR SURGERY  12/19/2018    [facility]. Arch aortogram,left upper arteriograms.  VASCULAR SURGERY  03/06/2019    [facility]. Removal of tunneled dialyis catheter right internal jugular vein.  VASCULAR SURGERY  08/28/2019    [facility]. Left upper extremity fistulogram including venography to the superior vena cava. Balloon angioplasty left subclavian vein with 10mm x 40mm conquest balloon. Balloon angioplasty mid/proximal upper arm cephalic vein with 01ZX x 40mm conquest balloon. Venograms after balloon angioplasty. Stent left mid/proximal upper arm cephalic vein stenosis fluency 10mm x 60mm self-expanding covered stent. Balloon     VASCULAR SURGERY  08/28/2019    cont angioplasty stent with 10mm x 40mm conquest balloon. Completion venograms left upper extremity. CURRENT MEDICATIONS       Previous Medications    ALBUTEROL SULFATE  (90 BASE) MCG/ACT INHALER    Inhale 2 puffs into the lungs every 4 hours as needed    CLONIDINE (CATAPRES) 0.3 MG TABLET    Take 1 tablet by mouth every 8 hours    DULOXETINE (CYMBALTA) 60 MG EXTENDED RELEASE CAPSULE    Take 60 mg by mouth daily    HYDRALAZINE (APRESOLINE) 50 MG TABLET    Take 1 tablet by mouth every 8 hours    HYDROCHLOROTHIAZIDE (HYDRODIURIL) 25 MG TABLET    Take 1 tablet by mouth daily    INSULIN ASPART (NOVOLOG FLEXPEN) 100 UNIT/ML INJECTION PEN    Inject 5 Units into the skin 3 times daily     INSULIN DETEMIR (LEVEMIR) 100 UNIT/ML INJECTION VIAL    Indications: Diabetes Through insulin pump.     ISOSORBIDE MONONITRATE (IMDUR) 120 MG EXTENDED RELEASE TABLET    Take 1 tablet by mouth daily    LEVOTHYROXINE (SYNTHROID) 150 MCG TABLET    Take 150 mcg by mouth Daily    LISINOPRIL (PRINIVIL;ZESTRIL) 20 MG TABLET    Take 1 tablet by mouth 2 times daily    METOPROLOL SUCCINATE (TOPROL XL) 100 MG EXTENDED RELEASE TABLET Take 1 tablet by mouth every morning (before breakfast)    NIFEDIPINE (ADALAT CC) 60 MG EXTENDED RELEASE TABLET    Take 60 mg by mouth daily    ONDANSETRON (ZOFRAN ODT) 4 MG DISINTEGRATING TABLET    Take 1 tablet by mouth every 8 hours as needed for Nausea or Vomiting    ONDANSETRON (ZOFRAN) 4 MG TABLET    Take 4 mg by mouth every 8 hours as needed for Nausea or Vomiting    ROPINIROLE (REQUIP) 2 MG TABLET    Take 2 mg by mouth nightly     SEVELAMER (RENVELA) 800 MG TABLET    Take 1 tablet by mouth 3 times daily (with meals)    SIMVASTATIN (ZOCOR) 40 MG TABLET    Take 40 mg by mouth nightly        ALLERGIES     Penicillins;  Lamisil advanced [terbinafine]; and Stadol [butorphanol]    FAMILY HISTORY       Family History   Problem Relation Age of Onset    Colon Cancer Mother          at 63    Colon Polyps Mother    Emi Watkins Father          at 66    Colon Polyps Father     Stomach Cancer Sister     Breast Cancer Sister     Depression Daughter 25        committed suicide    Liver Cancer Neg Hx     Liver Disease Neg Hx     Esophageal Cancer Neg Hx     Rectal Cancer Neg Hx           SOCIAL HISTORY       Social History     Socioeconomic History    Marital status: Single     Spouse name: Not on file    Number of children: 2    Years of education: Not on file    Highest education level: Not on file   Occupational History    Not on file   Social Needs    Financial resource strain: Not on file    Food insecurity     Worry: Not on file     Inability: Not on file   [employer] needs     Medical: Not on file     Non-medical: Not on file   Tobacco Use    Smoking status: Current Every Day Smoker     Packs/day: 2.00     Years: 33.00     Pack years: 66.00     Types: Cigarettes    Smokeless tobacco: Never Used    Tobacco comment: NOW at 1/4 PD, started at age 24 and has averaged 2 PPD   Substance and Sexual Activity    Alcohol use: No    Drug use: Not Currently     Types: Marijuana    Sexual activity: Not Currently     Partners: Male   Lifestyle    Physical activity     Days per week: Not on file     Minutes per session: Not on file    Stress: Not on file   Relationships    Social connections     Talks on phone: Not on file     Gets together: Not on file     Attends Sikhism service: Not on file     Active member of club or organization: Not on file     Attends meetings of clubs or organizations: Not on file     Relationship status: Not on file    Intimate partner violence     Fear of current or ex partner: Not on file     Emotionally abused: Not on file     Physically abused: Not on file     Forced sexual activity: Not on file   Other Topics Concern    Not on file   Social History Narrative    Not on file       SCREENINGS    [place]prise Coma Scale  Eye Opening: Spontaneous  Best Verbal Response: Oriented  Best Motor Response: Obeys commands  Y[place]uf Coma Scale Score: 15        PHYSICAL EXAM    (up to 7 for level 4, 8 or more for level 5)     ED Triage Vitals [08/11/20 2106]   BP Temp Temp Source Pulse Resp SpO2 Height Weight   (!) 157/90 98.9 °F (37.2 °C) Oral 72 22 95 % 5' 6\" (1.676 m) 133 lb 13.1 oz (60.7 kg)       Physical Exam  Vitals signs and nursing note reviewed. Constitutional:       General: She is not in acute distress. Appearance: Normal appearance. She is well-developed. She is not diaphoretic. HENT:      Head: Normocephalic and atraumatic. Mouth/Throat:      Pharynx: No oropharyngeal exudate. Eyes:      General: No scleral icterus. Pupils: Pupils are equal, round, and reactive to light. Neck:      Musculoskeletal: Normal range of motion. Trachea: No tracheal deviation. Cardiovascular:      Rate and Rhythm: Normal rate. Pulses: Normal pulses. Heart sounds: Normal heart sounds. Pulmonary:      Effort: Pulmonary effort is normal.      Breath sounds: Normal breath sounds. No stridor. No wheezing or rhonchi.    Abdominal:      General: There is no distension. Palpations: Abdomen is soft. Abdomen is not rigid. Tenderness: There is generalized abdominal tenderness. Hernia: No hernia is present. Comments: Patient jumps dramatically with minimal palpation of the abdominal wall, but abdominal tenderness is distractable   Musculoskeletal:         General: No deformity. Skin:     General: Skin is warm and dry. Findings: No rash. Neurological:      Mental Status: She is alert and oriented to person, place, and time. Cranial Nerves: No cranial nerve deficit.       Coordination: Coordination normal.   Psychiatric:         Behavior: Behavior normal.         DIAGNOSTIC RESULTS     EKG: All EKG's are interpreted by the Emergency Department Physician who either signs or Co-signs this chart in the absence of a cardiologist.      RADIOLOGY:   Non-plain film images such as CT, Ultrasound and MRI are read by the radiologist. Marzette Fay images are visualized and preliminarily interpreted by the emergency physician with the below findings:      Interpretation per the Radiologist below, if available at the time of this note:    CT ABDOMEN PELVIS W IV CONTRAST Additional Contrast? Oral    (Results Pending)         ED BEDSIDE ULTRASOUND:   Performed by ED Physician - none    LABS:  Labs Reviewed   CBC WITH AUTO DIFFERENTIAL - Abnormal; Notable for the following components:       Result Value    Platelets 395 (*)     Neutrophils % 70.1 (*)     Lymphocytes % 16.9 (*)     Basophils % 1.6 (*)     Lymphocytes Absolute 0.9 (*)     All other components within normal limits   COMPREHENSIVE METABOLIC PANEL W/ REFLEX TO MG FOR LOW K - Abnormal; Notable for the following components:    Sodium 135 (*)     Potassium reflex Magnesium 3.4 (*)     Chloride 88 (*)     CO2 33 (*)     Glucose 139 (*)     CREATININE 3.7 (*)     GFR Non- 13 (*)     GFR  16 (*)     All other components within normal limits   URINE RT REFLEX TO CULTURE - Abnormal; Notable for the following components:    Clarity, UA CLOUDY (*)     Glucose, Ur 250 (*)     Leukocyte Esterase, Urine MODERATE (*)     All other components within normal limits   MICROSCOPIC URINALYSIS - Abnormal; Notable for the following components:    Bacteria, UA Rare (*)     Yeast, UA Present (*)     All other components within normal limits   POCT GLUCOSE - Abnormal; Notable for the following components:    POC Glucose 134 (*)     All other components within normal limits   POCT GLUCOSE - Normal   CULTURE, URINE   LIPASE   MAGNESIUM       All other labs were within normal range or not returned as of this dictation. Medications   metoclopramide (REGLAN) injection 10 mg (10 mg Intravenous Given 8/11/20 2224)   promethazine (PHENERGAN) injection 12.5 mg (12.5 mg Intravenous Given 8/11/20 2224)   iopamidol (ISOVUE-370) 76 % injection 90 mL (90 mLs Intravenous Given 8/11/20 2334)   haloperidol lactate (HALDOL) injection 5 mg (5 mg Intravenous Given 8/12/20 0000)   promethazine (PHENERGAN) injection 12.5 mg (12.5 mg Intravenous Given 8/12/20 0129)   aluminum & magnesium hydroxide-simethicone (MAALOX) 30 mL, lidocaine viscous hcl (XYLOCAINE) 5 mL (GI COCKTAIL) ( Oral Given 8/12/20 0128)       EMERGENCY DEPARTMENT COURSE and DIFFERENTIALDIAGNOSIS/MDM:   Vitals:    Vitals:    08/11/20 2106 08/11/20 2359 08/12/20 0050 08/12/20 0132   BP: (!) 157/90 (!) 162/74 (!) 146/78 (!) 185/76   Pulse: 72 69 78 68   Resp: 22 20 20 20   Temp: 98.9 °F (37.2 °C)      TempSrc: Oral      SpO2: 95% 97% 98% 98%   Weight: 133 lb 13.1 oz (60.7 kg)      Height: 5' 6\" (1.676 m)          MDM    Patient had no further episodes of vomiting in the emergency department after treatment here. Laboratory evaluation was unremarkable.   CT was performed with oral and IV contrast and shows no acute findings    Evaluation and work-up here revealed no signs of emergent or life-threatening pathology that would necessitate admission for further work-up or management at this time. It is felt to be stable for discharge home with return precautions for worsening of the condition or development of new concerning symptoms. Patient was encouraged to follow-up with their primary care doctor in the appropriate timeframe. Necessary prescriptions and information have been provided for treatment at home. Patient voices understanding and agreement with the plan. CONSULTS:  None    PROCEDURES:  Unless otherwise notedbelow, none     Procedures      FINAL IMPRESSION     1. Non-intractable vomiting with nausea, unspecified vomiting type    2. ESRD on dialysis ([address]    3. Generalized abdominal pain    4.  History of diabetic gastroparesis          DISPOSITION/PLAN   DISPOSITION Decision To Discharge 08/12/2020 02:05:03 AM      PATIENT REFERRED TO:  [facility] [place] [facility] EMERGENCY DEPT  Harvey Holman  [phone]    If symptoms worsen    ROMINA Rey [address]  [phone]      As needed      DISCHARGE MEDICATIONS:  New Prescriptions    PROMETHAZINE (PHENERGAN) 25 MG TABLET    Take 1 tablet by mouth every 6 hours as needed for Nausea          (Please note that portions of this note were completed with a voice recognition program.  Efforts were made to edit the dictations butoccasionally words are mis-transcribed.)    Tyra Vargas MD (electronically signed)  AttendingEmergency Physician          Tyra Vargas., MD  08/12/20 [id number]

## 2020-08-12 NOTE — ED NOTES
Bed: 03  Expected date: 8/11/20  Expected time:   Means of arrival: [facility]:  [facility] n/v/x 1 wk     Deepthi Rice, [place]  08/11/20 6709

## 2020-08-14 LAB — URINE CULTURE, ROUTINE: NORMAL

## 2020-08-17 ENCOUNTER — CARE COORDINATION (OUTPATIENT)
Dept: CARE COORDINATION | Age: 50
End: 2020-08-17

## 2020-08-17 RX ORDER — DOXYCYCLINE HYCLATE 100 MG/1
100 CAPSULE ORAL 2 TIMES DAILY
COMMUNITY
Start: 2020-08-15 | End: 2020-08-24

## 2020-08-17 NOTE — CARE COORDINATION
COVID-19 Screening Follow-up Note    Patient contacted regarding COVID-19 risk and screening. Care Transition Nurse/ Ambulatory Care Manager contacted the patient by telephone to perform follow-up assessment. Verified name and  with patient as identifiers. Patient has following risk factors of: ESRD, DM        Symptoms reviewed with patient who verbalized the following symptoms: no new/worsening symptoms       Due to no new or worsening symptoms encounter was not routed to provider for escalation. Patient has seen her primary care provider and is scheduled for GI appt on . She stated she is using Phenergan for nausea mgmt and this is working for her. Pt was placed on antibiotic by her PCP for ear infection and she is taking this. Pt agreed to final f/u call with ACM later this week. Education provided regarding infection prevention, and signs and symptoms of COVID-19 and when to seek medical attention with patient who verbalized understanding. Discussed exposure protocols and quarantine from [address] you at higher risk for severe illness  and given an opportunity for questions and concerns. The patient agrees to contact the COVID-19 hotline [phone] or PCP office for questions related to their healthcare. CTN/ACM provided contact information for future reference. From CDC: Are you at higher risk for severe illness?  Wash your hands often.  Avoid close contact (6 feet, which is about two arm lengths) with people who are sick.  Put distance between yourself and other people if COVID-19 is spreading in your community.  Clean and disinfect frequently touched surfaces.  Avoid all cruise travel and non-essential air travel.  Call your healthcare professional if you have concerns about COVID-19 and your underlying condition or if you are sick.     For more information on steps you can take to protect yourself, see CDC's How to [place] for follow-up call in 3-5 days based on severity of symptoms and risk factors

## 2020-08-18 ENCOUNTER — TELEPHONE (OUTPATIENT)
Dept: GASTROENTEROLOGY | Age: 50
End: 2020-08-18

## 2020-08-18 ENCOUNTER — OFFICE VISIT (OUTPATIENT)
Dept: GASTROENTEROLOGY | Age: 50
End: 2020-08-18
Payer: MEDICARE

## 2020-08-18 VITALS
SYSTOLIC BLOOD PRESSURE: 179 MMHG | OXYGEN SATURATION: 99 % | HEIGHT: 66 IN | DIASTOLIC BLOOD PRESSURE: 70 MMHG | BODY MASS INDEX: 22.18 KG/M2 | HEART RATE: 86 BPM | WEIGHT: 138 LBS

## 2020-08-18 PROBLEM — Z86.010 PERSONAL HISTORY OF COLONIC POLYPS: Status: ACTIVE | Noted: 2020-08-18

## 2020-08-18 PROBLEM — R10.13 CHRONIC EPIGASTRIC PAIN: Status: ACTIVE | Noted: 2020-08-18

## 2020-08-18 PROBLEM — G89.29 CHRONIC EPIGASTRIC PAIN: Status: ACTIVE | Noted: 2020-08-18

## 2020-08-18 PROBLEM — R93.5 ABNORMAL CT OF THE ABDOMEN: Status: ACTIVE | Noted: 2020-08-18

## 2020-08-18 PROBLEM — R63.0 POOR APPETITE: Status: ACTIVE | Noted: 2020-08-18

## 2020-08-18 PROBLEM — R11.10 CHRONIC VOMITING: Status: ACTIVE | Noted: 2020-08-18

## 2020-08-18 PROBLEM — R11.0 CHRONIC NAUSEA: Status: ACTIVE | Noted: 2020-08-18

## 2020-08-18 PROCEDURE — 4004F PT TOBACCO SCREEN RCVD TLK: CPT | Performed by: NURSE PRACTITIONER

## 2020-08-18 PROCEDURE — 99215 OFFICE O/P EST HI 40 MIN: CPT | Performed by: NURSE PRACTITIONER

## 2020-08-18 PROCEDURE — 3017F COLORECTAL CA SCREEN DOC REV: CPT | Performed by: NURSE PRACTITIONER

## 2020-08-18 PROCEDURE — G8427 DOCREV CUR MEDS BY ELIG CLIN: HCPCS | Performed by: NURSE PRACTITIONER

## 2020-08-18 PROCEDURE — G8420 CALC BMI NORM PARAMETERS: HCPCS | Performed by: NURSE PRACTITIONER

## 2020-08-18 RX ORDER — ONDANSETRON 4 MG/1
4 TABLET, ORALLY DISINTEGRATING ORAL EVERY 8 HOURS PRN
COMMUNITY
End: 2021-04-29 | Stop reason: ALTCHOICE

## 2020-08-18 RX ORDER — ERGOCALCIFEROL (VITAMIN D2) 10 MCG
1.25 TABLET ORAL
COMMUNITY

## 2020-08-18 RX ORDER — SENNA PLUS 8.6 MG/1
1 TABLET ORAL DAILY
COMMUNITY

## 2020-08-18 RX ORDER — NEOMYCIN SULFATE, POLYMYXIN B SULFATE, HYDROCORTISONE 3.5; 10000; 1 MG/ML; [USP'U]/ML; MG/ML
3 SOLUTION/ DROPS AURICULAR (OTIC) 3 TIMES DAILY
Status: ON HOLD | COMMUNITY
End: 2020-09-16 | Stop reason: ALTCHOICE

## 2020-08-18 ASSESSMENT — ENCOUNTER SYMPTOMS
COUGH: 1
ABDOMINAL DISTENTION: 0
ANAL BLEEDING: 0
RECTAL PAIN: 0
BLOOD IN STOOL: 0
PHOTOPHOBIA: 0
VOMITING: 1
TROUBLE SWALLOWING: 0
BACK PAIN: 0
SORE THROAT: 0
DIARRHEA: 0
SHORTNESS OF BREATH: 0
VOICE CHANGE: 0
NAUSEA: 1
ABDOMINAL PAIN: 1
CONSTIPATION: 1

## 2020-08-18 NOTE — TELEPHONE ENCOUNTER
----- Message from Annika Dorsey sent at 8/18/2020  2:33 PM CDT -----  Regarding: referral  Check out comments:  She was supposed to be referred to Dr Amber Reyna in 2017 abdn 2018 and she still hasnt heard anything!  Please find out what is going on, she needs this referral

## 2020-08-18 NOTE — TELEPHONE ENCOUNTER
8/18/20  Called [phone] opt 5, lvm on [employer] voice mail asking what happen in scheduling this pt? Also fax cover sheet w/ the same question and asked them to call us back.

## 2020-08-18 NOTE — PROGRESS NOTES
Subjective:      Mathieu Mcclendon is a49 y.o. female  Chief Complaint   Patient presents with    Abdominal Pain    Nausea       HPI  PCP: ROMINA Carmona  Pt is here today with c/o upper abd pain and nausea. These are chronic complaints for >25 years. Has known gastroparesis. Vomits pretty much daily. The BABATUNDE pain is described as \"pressure. \"  Has tried and failed bethanechol and zofran. And is allergic to Reglan. She has uncontrolled DM, reports her HgA1C is 9.4, she has an insulin pump. Currently using phenergan prn, the only route it provides much relief is IV form, she goes to the ED to get this. Reports she went to the ED 8/11/2020 for the phenergan shot, had a CT scan, and was advised she had a spot on her pancreas that needs further evaluation. She was referred to Dr Ganga Hardin in 2017 as well as 2018 for the gastroparesis, she never heard back from that office with either referral so she never went. Has lost 20 pounds in 2 years, unintentionally. Associated with a poor appetite. Has constipation. Chronic all of her adult life. Takes 5 senna daily. And she has a soft BM every few days. But oftentimes feels she never empties. Reports she has tried and failed all OTC meds in the past.  She wants to try something by RX so she doesn't have to take so much Senna every day. Has ESRD, on dialysis, on a 32 ounce fluid restriction daily. GI scope reports in history per MA per OV policy, I reviewed this. Family HX:  Father had colon polyps, mother had colon cancer, sister had gastric cancer  Pt denies family hx of inflammatory bowel dx, and esophageal CA.     Past Medical History:   Diagnosis Date    Arthritis     Chronic kidney disease, stage 5, kidney failure (HCC)     Closed fracture of right shoulder girdle, with routine healing, subsequent encounter     DM (diabetes mellitus) type II controlled with renal manifestation ([facility] [address] 06/1993    Gastroparesis     GERD (gastroesophageal  VASCULAR SURGERY  08/28/2019    [facility]. Left upper extremity fistulogram including venography to the superior vena cava. Balloon angioplasty left subclavian vein with 10mm x 40mm conquest balloon. Balloon angioplasty mid/proximal upper arm cephalic vein with 59WK x 40mm conquest balloon. Venograms after balloon angioplasty. Stent left mid/proximal upper arm cephalic vein stenosis fluency 10mm x 60mm self-expanding covered stent. Balloon     VASCULAR SURGERY  08/28/2019    cont angioplasty stent with 10mm x 40mm conquest balloon. Completion venograms left upper extremity.        Social History     Socioeconomic History    Marital status: Single     Spouse name: None    Number of children: 2    Years of education: None    Highest education level: None   Occupational History    None   Social Needs    Financial resource strain: None    Food insecurity     Worry: None     Inability: None    Transportation needs     Medical: None     Non-medical: None   Tobacco Use    Smoking status: Current Every Day Smoker     Packs/day: 2.00     Years: 33.00     Pack years: 66.00     Types: Cigarettes    Smokeless tobacco: Never Used    Tobacco comment: NOW at 1/4 PD, started at age 24 and has averaged 2 PPD   Substance and Sexual Activity    Alcohol use: No    Drug use: Not Currently     Types: Marijuana    Sexual activity: Not Currently     Partners: Male   Lifestyle    Physical activity     Days per week: None     Minutes per session: None    Stress: None   Relationships    Social connections     Talks on phone: None     Gets together: None     Attends Jain service: None     Active member of club or organization: None     Attends meetings of clubs or organizations: None     Relationship status: None    Intimate partner violence     Fear of current or ex partner: None     Emotionally abused: None     Physically abused: None     Forced sexual activity: None   Other Topics Concern    None   Social History Narrative  None       Allergies   Allergen Reactions    Penicillins Hives and Shortness Of Breath    Lamisil Advanced [Terbinafine] Nausea And Vomiting    Stadol [Butorphanol] Nausea And Vomiting     And made me feel crazy    Reglan [Metoclopramide] Other (See Comments)     Body twitching and crawling out of her skin feeling       Current Outpatient Medications   Medication Sig Dispense Refill    ondansetron (ZOFRAN-ODT) 4 MG disintegrating tablet Take 4 mg by mouth every 8 hours as needed for Nausea or Vomiting      neomycin-polymyxin-hydrocortisone 1 % SOLN otic solution Place 3 drops into both ears 3 times daily In affected ear(s)      Ergocalciferol (VITAMIN D2) 10 MCG (400 UNIT) TABS Take 1.25 mg by mouth every 14 days      senna (SENNA-LAX) 8.6 MG tablet Take 1 tablet by mouth daily      linaclotide (LINZESS) 290 MCG CAPS capsule Take 1 capsule by mouth every morning (before breakfast) 30 capsule 11    doxycycline hyclate (VIBRAMYCIN) 100 MG capsule Take 100 mg by mouth 2 times daily      promethazine (PHENERGAN) 25 MG tablet Take 1 tablet by mouth every 6 hours as needed for Nausea 12 tablet 0    cloNIDine (CATAPRES) 0.3 MG tablet Take 1 tablet by mouth every 8 hours 60 tablet 3    hydrochlorothiazide (HYDRODIURIL) 25 MG tablet Take 1 tablet by mouth daily 30 tablet 3    sevelamer (RENVELA) 800 MG tablet Take 1 tablet by mouth 3 times daily (with meals)      lisinopril (PRINIVIL;ZESTRIL) 20 MG tablet Take 1 tablet by mouth 2 times daily 30 tablet 0    metoprolol succinate (TOPROL XL) 100 MG extended release tablet Take 1 tablet by mouth every morning (before breakfast) 30 tablet 0    hydrALAZINE (APRESOLINE) 50 MG tablet Take 1 tablet by mouth every 8 hours 90 tablet 0    insulin detemir (LEVEMIR) 100 UNIT/ML injection vial Indications: Diabetes Through insulin pump.       albuterol sulfate  (90 Base) MCG/ACT inhaler Inhale 2 puffs into the lungs every 4 hours as needed      levothyroxine (SYNTHROID) 150 MCG tablet Take 150 mcg by mouth Daily      NIFEdipine (ADALAT CC) 60 MG extended release tablet Take 60 mg by mouth daily      isosorbide mononitrate (IMDUR) 120 MG extended release tablet Take 1 tablet by mouth daily 30 tablet 0    DULoxetine (CYMBALTA) 60 MG extended release capsule Take 60 mg by mouth daily      rOPINIRole (REQUIP) 2 MG tablet Take 4 mg by mouth nightly Indications: Restless Leg Syndrome       simvastatin (ZOCOR) 40 MG tablet Take 40 mg by mouth nightly       insulin aspart (NOVOLOG FLEXPEN) 100 UNIT/ML injection pen Inject 5 Units into the skin 3 times daily        No current facility-administered medications for this visit. Review of Systems   Constitutional: Positive for appetite change, fatigue and unexpected weight change. Negative for fever. HENT: Negative for sore throat, trouble swallowing and voice change. Eyes: Negative for photophobia and visual disturbance. Respiratory: Positive for cough. Negative for shortness of breath. Cardiovascular: Negative for chest pain, palpitations and leg swelling. Gastrointestinal: Positive for abdominal pain, constipation, nausea and vomiting. Negative for abdominal distention, anal bleeding, blood in stool, diarrhea and rectal pain. Genitourinary: Negative for hematuria. Musculoskeletal: Negative for arthralgias, back pain and neck pain. Allergic/Immunologic: Negative for immunocompromised state. Neurological: Negative for syncope and weakness. Hematological: Negative for adenopathy. Does not bruise/bleed easily. Psychiatric/Behavioral: Negative for dysphoric mood and sleep disturbance. The patient is not nervous/anxious. All other systems reviewed and are negative. Objective:     Physical Exam  Vitals signs and nursing note reviewed. Constitutional:       General: She is not in acute distress. Appearance: She is well-developed.       Comments: BP (!) 179/70   Pulse 86   Ht 5' 6\" (1.676 m)   Wt 138 lb (62.6 kg)   SpO2 99%   BMI 22.27 kg/m²    HENT:      Head: Normocephalic and atraumatic. Right Ear: External ear normal.      Left Ear: External ear normal.   Eyes:      General: No scleral icterus. Conjunctiva/sclera: Conjunctivae normal.      Pupils: Pupils are equal, round, and reactive to light. Neck:      Musculoskeletal: Normal range of motion and neck supple. Thyroid: No thyromegaly. Cardiovascular:      Rate and Rhythm: Normal rate and regular rhythm. Heart sounds: Normal heart sounds. No murmur. No friction rub. No gallop. Comments: No edema noted to paloma lower extremities   Pulmonary:      Effort: Pulmonary effort is normal. No respiratory distress. Breath sounds: Normal breath sounds. Abdominal:      General: Bowel sounds are normal. There is no distension. Palpations: Abdomen is soft. Tenderness: There is no abdominal tenderness. There is no rebound. Comments: Hepatosplenomegaly not appreciated   Musculoskeletal: Normal range of motion. General: No deformity. Comments: Normal gait on exam   Neurological:      Mental Status: She is alert and oriented to person, place, and time. Cranial Nerves: No cranial nerve deficit. Psychiatric:         Judgment: Judgment normal.           Assessment:       Diagnosis Orders   1. Chronic epigastric pain  ESOPHAGOSCOPY / EGD   2. Chronic nausea  ESOPHAGOSCOPY / EGD   3. Chronic vomiting  ESOPHAGOSCOPY / EGD   4. Gastroparesis  ESOPHAGOSCOPY / EGD   5. Abnormal CT of the abdomen  ENDOSCOPIC ULTRASOUND (UPPER)   6. Personal history of colonic polyps  COLONOSCOPY W/ OR W/O BIOPSY   7. Chronic constipation  COLONOSCOPY W/ OR W/O BIOPSY    linaclotide (LINZESS) 290 MCG CAPS capsule   8. Unintentional weight loss  COLONOSCOPY W/ OR W/O BIOPSY    ESOPHAGOSCOPY / EGD    ENDOSCOPIC ULTRASOUND (UPPER)   9. Poor appetite  ESOPHAGOSCOPY / EGD   10.  ESRD (end stage renal disease) on dialysis [facility])           Plan:      1. Not sure she will be a candidate for gastric stilumulator with her hx of ESRD, however will make referral and they can review her chart to see if she is a candidate. If not, she knows she must get better control of her DM. 2. escribed linzess 290mcg daily. F/u in 3-4 weeks if not effective  3. Schedule outpatient endoscopy with EUS. Patient advised no Aspirin, Fish Oil, Vit E or NSAIDs 5 (five) days before procedure. Follow-up Visit: per Dr. Emma Forrester  Pt Education:   Risks, benefits, and alternatives to endoscopy were discussed. Patient voices understanding of risks of, but not limited to, perforation, bleeding, and infection. The risk of perforation is increased with esophageal dilatation. All questions answered to patient's satisfaction. Patient is agreable to proceed. 4. Schedule outpatient colonoscopy with prep clearance per nephrology. Patient advised no Aspirin, Fish Oil, Vit E or NSAIDs 5 (five) days before procedure. Follow-up Visit: per Dr Emma Forrester  Pt education:  Risks, benefits, and alternatives to colonoscopy were discussed. Risks of colonoscopy include, but are not limited to, perforation, bleeding, and infection. We discussed that the risk for perforation is 1-3 in 5,000  at the time of colonoscopy;   and 1-2% risk of bleeding post-polypectomy. All questions answered to the satisfaction of the patient. Pt is agreeable to proceed.

## 2020-08-18 NOTE — PATIENT INSTRUCTIONS
MEDICATION(S) FOR HIGH BLOOD PRESSURE, THYROID, SEIZURES, AND HEART THE MORNING OF THE PROCEDURE WITH A SIP OF WATER  AT LEAST 2 HOURS PRIOR TO ARRIVAL TIME.   YOU MAY ALSO TAKE ANY INHALERS YOU ARE PRESCRIBED. You will not be able to drive for 24 hours after the procedure due to sedation. Bring a  with you the day of the procedure. No aspirin, ibuprofen, naproxen, fish oil or vitamin E for 5 days before procedure. If you are on blood thinners, clearance from the prescribing physician will be obtained before your procedure is scheduled. [email or web address] may be associated with discontinuation of your blood thinner and include, but not limited to, stroke, TIA, or cardiac event. If polyps are removed during the procedure they will be sent to a pathologist for analysis. You will be notified by mail of the pathology results in 2-3 weeks. Your physician may also schedule a follow up appointment with the nurse practitioner to discuss pathology, symptoms or to check if you have had any problems related to your procedure. If you prefer not to return to the office after your procedure please discuss this with your physician on the day of your colonoscopy. Final recommendations are based on the pathologist report. Your diet before a colonoscopy bowel preparation is very important to ensure a successful colon exam. It is recommended to consider certain changes to your diet three to four days prior to the procedure. Remember that your bowels need to be empty for the exam.    What foods are good to eat? Cut down on heavy solid foods three to four days before the procedure and start introducing lighter meals to your diet. The following food suggestions are a good part of your diet before a colonoscopy bowel preparation.   Light meat that is easily digestible such as chicken (without the skin)   Potatoes without skin   Cheese   Eggs   A light meal of steamed white fish   Light clear soups    Foods and drinks to avoid  Avoid foods that contain too much fiber. Stay clear of dark colored beverages. They can stick to the walls of the digestive tract and make it difficult to differentiate from blood. Some of these foods are:  Red meat, rice, nuts and vegetables   Milk, other milk based fluids and cream   Most fruit and puddings   Whole grain pasta   Cereals, bran and seeds   Colored beverages, especially those that are red or purple in color   Red colored Jell-O   On the day before the colonoscopy, continue to drink plenty of clear fluids. It is important   to keep yourself hydrated before the exam.     Please follow all instructions as provided for cleansing the bowel. Failure to have an adequately prepped colon may cause you to have incomplete exam with further testing required.

## 2020-08-20 ENCOUNTER — CARE COORDINATION (OUTPATIENT)
Dept: CARE COORDINATION | Age: 50
End: 2020-08-20

## 2020-08-20 NOTE — CARE COORDINATION
Your Patient resolved from the Care Transitions episode on 8/20/2020  Discussed COVID-19 related testing which was not done at this time. Test results were not done. Patient informed of results, if available? NA    Patient/family has been provided the following resources and education related to COVID-19:                         Signs, symptoms and red flags related to COVID-19            CDC exposure and quarantine guidelines            [facility] exposure contact - [phone]            Contact for their local Department of Health                 Patient currently reports that the following symptoms have improved:  no new/worsening symptoms   Kaleb Birmingham has seen her GI specialist for f/u on her ER visit and symptoms. She has had improvement of her nausea with use of Phenergan. Pt plans to  her newest prescription after her dialysis appt today. She has some upcoming GI procedures and is expecting a referral to another specialist in [place]. Kaleb Birmingham has no CP, SOA, cough, fever or other concerning symptoms at this time. She is back to her baseline. No further outreach scheduled with this CTN/ACM. Episode of Care resolved. Patient has this CTN/ACM contact information if future needs arise.

## 2020-08-21 NOTE — TELEPHONE ENCOUNTER
8/21/20  RECEIVED A FAX FROM [facility] STATING THEY NEVER RECEIVED THE REFERRAL. NOT TRUE BC FROM THE LAST NOTE ON THIS REFERRAL BACK IN 2018 FROM [facility], SHE WAS STILL ON WAIT LIST. SO I RE FAXED THE REFERRAL W/ THEIR NEW REFERRAL FORM -[phone]. ATTACHED FAX CONFIRMATION TO THIS ENCOUNTER.

## 2020-09-12 ENCOUNTER — OFFICE VISIT (OUTPATIENT)
Age: 50
End: 2020-09-12

## 2020-09-12 VITALS — TEMPERATURE: 98.9 F | HEART RATE: 66 BPM | OXYGEN SATURATION: 98 %

## 2020-09-12 PROCEDURE — 99999 PR OFFICE/OUTPT VISIT,PROCEDURE ONLY: CPT | Performed by: NURSE PRACTITIONER

## 2020-09-15 LAB — SARS-COV-2, NAA: NOT DETECTED

## 2020-09-16 ENCOUNTER — ANESTHESIA (OUTPATIENT)
Dept: ICU | Age: 50
DRG: 637 | End: 2020-09-16
Payer: MEDICARE

## 2020-09-16 ENCOUNTER — ANESTHESIA EVENT (OUTPATIENT)
Dept: ICU | Age: 50
DRG: 637 | End: 2020-09-16
Payer: MEDICARE

## 2020-09-16 ENCOUNTER — HOSPITAL ENCOUNTER (INPATIENT)
Age: 50
LOS: 2 days | Discharge: HOME | DRG: 637 | End: 2020-09-18
Attending: INTERNAL MEDICINE | Admitting: INTERNAL MEDICINE
Payer: MEDICARE

## 2020-09-16 PROBLEM — E87.0 HYPEROSMOLAR SYNDROME: Status: ACTIVE | Noted: 2020-09-16

## 2020-09-16 LAB
ALBUMIN SERPL-MCNC: 3.4 G/DL (ref 3.5–5.2)
ALP BLD-CCNC: 107 U/L (ref 35–104)
ALT SERPL-CCNC: 21 U/L (ref 5–33)
ANION GAP SERPL CALCULATED.3IONS-SCNC: 12 MMOL/L (ref 7–19)
ANION GAP SERPL CALCULATED.3IONS-SCNC: 13 MMOL/L (ref 7–19)
ANION GAP SERPL CALCULATED.3IONS-SCNC: 14 MMOL/L (ref 7–19)
ANION GAP SERPL CALCULATED.3IONS-SCNC: 15 MMOL/L (ref 7–19)
AST SERPL-CCNC: 20 U/L (ref 5–32)
BILIRUB SERPL-MCNC: 0.5 MG/DL (ref 0.2–1.2)
BUN BLDV-MCNC: 10 MG/DL (ref 6–20)
BUN BLDV-MCNC: 11 MG/DL (ref 6–20)
BUN BLDV-MCNC: 19 MG/DL (ref 6–20)
BUN BLDV-MCNC: 20 MG/DL (ref 6–20)
CALCIUM SERPL-MCNC: 8.1 MG/DL (ref 8.6–10)
CALCIUM SERPL-MCNC: 8.2 MG/DL (ref 8.6–10)
CALCIUM SERPL-MCNC: 8.5 MG/DL (ref 8.6–10)
CALCIUM SERPL-MCNC: 9 MG/DL (ref 8.6–10)
CHLORIDE BLD-SCNC: 76 MMOL/L (ref 98–111)
CHLORIDE BLD-SCNC: 79 MMOL/L (ref 98–111)
CHLORIDE BLD-SCNC: 91 MMOL/L (ref 98–111)
CHLORIDE BLD-SCNC: 93 MMOL/L (ref 98–111)
CO2: 27 MMOL/L (ref 22–29)
CO2: 27 MMOL/L (ref 22–29)
CO2: 28 MMOL/L (ref 22–29)
CO2: 29 MMOL/L (ref 22–29)
CREAT SERPL-MCNC: 1.9 MG/DL (ref 0.5–0.9)
CREAT SERPL-MCNC: 2.6 MG/DL (ref 0.5–0.9)
CREAT SERPL-MCNC: 3.4 MG/DL (ref 0.5–0.9)
CREAT SERPL-MCNC: 3.4 MG/DL (ref 0.5–0.9)
GFR AFRICAN AMERICAN: 17
GFR AFRICAN AMERICAN: 17
GFR AFRICAN AMERICAN: 23
GFR AFRICAN AMERICAN: 34
GFR NON-AFRICAN AMERICAN: 14
GFR NON-AFRICAN AMERICAN: 14
GFR NON-AFRICAN AMERICAN: 19
GFR NON-AFRICAN AMERICAN: 28
GLUCOSE BLD-MCNC: 1020 MG/DL (ref 74–109)
GLUCOSE BLD-MCNC: 1035 MG/DL (ref 74–109)
GLUCOSE BLD-MCNC: 123 MG/DL (ref 70–99)
GLUCOSE BLD-MCNC: 319 MG/DL (ref 70–99)
GLUCOSE BLD-MCNC: 447 MG/DL (ref 74–109)
GLUCOSE BLD-MCNC: 45 MG/DL (ref 70–99)
GLUCOSE BLD-MCNC: 53 MG/DL (ref 70–99)
GLUCOSE BLD-MCNC: 60 MG/DL (ref 74–109)
GLUCOSE BLD-MCNC: 83 MG/DL (ref 70–99)
GLUCOSE BLD-MCNC: >550 MG/DL (ref 70–99)
HCG(URINE) PREGNANCY TEST: NEGATIVE
HCT VFR BLD CALC: 27.5 % (ref 37–47)
HEMOGLOBIN: 9.1 G/DL (ref 12–16)
MAGNESIUM: 2.4 MG/DL (ref 1.6–2.6)
MAGNESIUM: 2.4 MG/DL (ref 1.6–2.6)
MAGNESIUM: 3 MG/DL (ref 1.6–2.6)
MCH RBC QN AUTO: 32 PG (ref 27–31)
MCHC RBC AUTO-ENTMCNC: 33.1 G/DL (ref 33–37)
MCV RBC AUTO: 96.8 FL (ref 81–99)
PARATHYROID HORMONE INTACT: 160.8 PG/ML (ref 15–65)
PDW BLD-RTO: 14.1 % (ref 11.5–14.5)
PERFORMED ON: ABNORMAL
PERFORMED ON: NORMAL
PHOSPHORUS: 1.3 MG/DL (ref 2.5–4.5)
PHOSPHORUS: 1.4 MG/DL (ref 2.5–4.5)
PHOSPHORUS: 3.6 MG/DL (ref 2.5–4.5)
PLATELET # BLD: 95 K/UL (ref 130–400)
PMV BLD AUTO: 10.3 FL (ref 9.4–12.3)
POTASSIUM REFLEX MAGNESIUM: 4.9 MMOL/L (ref 3.5–5)
POTASSIUM SERPL-SCNC: 2.8 MMOL/L (ref 3.5–5)
POTASSIUM SERPL-SCNC: 3 MMOL/L (ref 3.5–5)
POTASSIUM SERPL-SCNC: 4.3 MMOL/L (ref 3.5–5)
RBC # BLD: 2.84 M/UL (ref 4.2–5.4)
SODIUM BLD-SCNC: 119 MMOL/L (ref 136–145)
SODIUM BLD-SCNC: 120 MMOL/L (ref 136–145)
SODIUM BLD-SCNC: 132 MMOL/L (ref 136–145)
SODIUM BLD-SCNC: 133 MMOL/L (ref 136–145)
TOTAL PROTEIN: 5.9 G/DL (ref 6.6–8.7)
WBC # BLD: 5.6 K/UL (ref 4.8–10.8)

## 2020-09-16 PROCEDURE — 84100 ASSAY OF PHOSPHORUS: CPT

## 2020-09-16 PROCEDURE — 2500000003 HC RX 250 WO HCPCS: Performed by: INTERNAL MEDICINE

## 2020-09-16 PROCEDURE — 80053 COMPREHEN METABOLIC PANEL: CPT

## 2020-09-16 PROCEDURE — 6360000002 HC RX W HCPCS: Performed by: INTERNAL MEDICINE

## 2020-09-16 PROCEDURE — 2580000003 HC RX 258: Performed by: INTERNAL MEDICINE

## 2020-09-16 PROCEDURE — 6370000000 HC RX 637 (ALT 250 FOR IP): Performed by: INTERNAL MEDICINE

## 2020-09-16 PROCEDURE — 8010000000 HC HEMODIALYSIS ACUTE INPT

## 2020-09-16 PROCEDURE — 84703 CHORIONIC GONADOTROPIN ASSAY: CPT

## 2020-09-16 PROCEDURE — 83970 ASSAY OF PARATHORMONE: CPT

## 2020-09-16 PROCEDURE — 85027 COMPLETE CBC AUTOMATED: CPT

## 2020-09-16 PROCEDURE — 83735 ASSAY OF MAGNESIUM: CPT

## 2020-09-16 PROCEDURE — 2000000000 HC ICU R&B

## 2020-09-16 PROCEDURE — 36415 COLL VENOUS BLD VENIPUNCTURE: CPT

## 2020-09-16 PROCEDURE — 82947 ASSAY GLUCOSE BLOOD QUANT: CPT

## 2020-09-16 RX ORDER — ONDANSETRON 2 MG/ML
4 INJECTION INTRAMUSCULAR; INTRAVENOUS EVERY 6 HOURS PRN
Status: DISCONTINUED | OUTPATIENT
Start: 2020-09-16 | End: 2020-09-18 | Stop reason: HOSPADM

## 2020-09-16 RX ORDER — SODIUM CHLORIDE, SODIUM LACTATE, POTASSIUM CHLORIDE, CALCIUM CHLORIDE 600; 310; 30; 20 MG/100ML; MG/100ML; MG/100ML; MG/100ML
INJECTION, SOLUTION INTRAVENOUS CONTINUOUS
Status: DISCONTINUED | OUTPATIENT
Start: 2020-09-16 | End: 2020-09-16 | Stop reason: CLARIF

## 2020-09-16 RX ORDER — SODIUM CHLORIDE 9 MG/ML
INJECTION, SOLUTION INTRAVENOUS CONTINUOUS
Status: DISCONTINUED | OUTPATIENT
Start: 2020-09-16 | End: 2020-09-16

## 2020-09-16 RX ORDER — LEVOTHYROXINE SODIUM 0.07 MG/1
150 TABLET ORAL DAILY
Status: DISCONTINUED | OUTPATIENT
Start: 2020-09-17 | End: 2020-09-18 | Stop reason: HOSPADM

## 2020-09-16 RX ORDER — SODIUM CHLORIDE 450 MG/100ML
INJECTION, SOLUTION INTRAVENOUS CONTINUOUS
Status: DISCONTINUED | OUTPATIENT
Start: 2020-09-16 | End: 2020-09-16

## 2020-09-16 RX ORDER — ACETAMINOPHEN 325 MG/1
650 TABLET ORAL EVERY 6 HOURS PRN
Status: DISCONTINUED | OUTPATIENT
Start: 2020-09-16 | End: 2020-09-18 | Stop reason: HOSPADM

## 2020-09-16 RX ORDER — PROMETHAZINE HYDROCHLORIDE 12.5 MG/1
12.5 TABLET ORAL EVERY 6 HOURS PRN
Status: DISCONTINUED | OUTPATIENT
Start: 2020-09-16 | End: 2020-09-18 | Stop reason: HOSPADM

## 2020-09-16 RX ORDER — DULOXETIN HYDROCHLORIDE 60 MG/1
60 CAPSULE, DELAYED RELEASE ORAL DAILY
Status: DISCONTINUED | OUTPATIENT
Start: 2020-09-16 | End: 2020-09-18 | Stop reason: HOSPADM

## 2020-09-16 RX ORDER — NIFEDIPINE 60 MG/1
60 TABLET, EXTENDED RELEASE ORAL DAILY
Status: DISCONTINUED | OUTPATIENT
Start: 2020-09-17 | End: 2020-09-18 | Stop reason: HOSPADM

## 2020-09-16 RX ORDER — SODIUM CHLORIDE 0.9 % (FLUSH) 0.9 %
10 SYRINGE (ML) INJECTION PRN
Status: DISCONTINUED | OUTPATIENT
Start: 2020-09-16 | End: 2020-09-18 | Stop reason: HOSPADM

## 2020-09-16 RX ORDER — LABETALOL HYDROCHLORIDE 5 MG/ML
20 INJECTION, SOLUTION INTRAVENOUS EVERY 4 HOURS PRN
Status: DISCONTINUED | OUTPATIENT
Start: 2020-09-16 | End: 2020-09-18 | Stop reason: HOSPADM

## 2020-09-16 RX ORDER — SODIUM CHLORIDE 9 MG/ML
INJECTION, SOLUTION INTRAVENOUS CONTINUOUS
Status: DISCONTINUED | OUTPATIENT
Start: 2020-09-16 | End: 2020-09-17

## 2020-09-16 RX ORDER — HYDRALAZINE HYDROCHLORIDE 20 MG/ML
10 INJECTION INTRAMUSCULAR; INTRAVENOUS EVERY 6 HOURS PRN
Status: DISCONTINUED | OUTPATIENT
Start: 2020-09-16 | End: 2020-09-18 | Stop reason: HOSPADM

## 2020-09-16 RX ORDER — POTASSIUM CHLORIDE 7.45 MG/ML
10 INJECTION INTRAVENOUS PRN
Status: DISCONTINUED | OUTPATIENT
Start: 2020-09-16 | End: 2020-09-18 | Stop reason: HOSPADM

## 2020-09-16 RX ORDER — ROPINIROLE 4 MG/1
4 TABLET, FILM COATED ORAL NIGHTLY
Status: DISCONTINUED | OUTPATIENT
Start: 2020-09-16 | End: 2020-09-18 | Stop reason: HOSPADM

## 2020-09-16 RX ORDER — HYDRALAZINE HYDROCHLORIDE 50 MG/1
50 TABLET, FILM COATED ORAL EVERY 8 HOURS SCHEDULED
Status: DISCONTINUED | OUTPATIENT
Start: 2020-09-16 | End: 2020-09-18 | Stop reason: HOSPADM

## 2020-09-16 RX ORDER — METOPROLOL SUCCINATE 50 MG/1
100 TABLET, EXTENDED RELEASE ORAL
Status: DISCONTINUED | OUTPATIENT
Start: 2020-09-17 | End: 2020-09-18 | Stop reason: HOSPADM

## 2020-09-16 RX ORDER — HYDROCHLOROTHIAZIDE 25 MG/1
25 TABLET ORAL DAILY
Status: DISCONTINUED | OUTPATIENT
Start: 2020-09-16 | End: 2020-09-18 | Stop reason: HOSPADM

## 2020-09-16 RX ORDER — SEVELAMER CARBONATE 800 MG/1
800 TABLET, FILM COATED ORAL
Status: DISCONTINUED | OUTPATIENT
Start: 2020-09-16 | End: 2020-09-18 | Stop reason: HOSPADM

## 2020-09-16 RX ORDER — SODIUM CHLORIDE 0.9 % (FLUSH) 0.9 %
10 SYRINGE (ML) INJECTION EVERY 12 HOURS SCHEDULED
Status: DISCONTINUED | OUTPATIENT
Start: 2020-09-16 | End: 2020-09-18 | Stop reason: HOSPADM

## 2020-09-16 RX ORDER — ATORVASTATIN CALCIUM 20 MG/1
20 TABLET, FILM COATED ORAL DAILY
Status: DISCONTINUED | OUTPATIENT
Start: 2020-09-17 | End: 2020-09-18 | Stop reason: HOSPADM

## 2020-09-16 RX ORDER — IPRATROPIUM BROMIDE AND ALBUTEROL SULFATE 2.5; .5 MG/3ML; MG/3ML
1 SOLUTION RESPIRATORY (INHALATION) EVERY 4 HOURS PRN
Status: DISCONTINUED | OUTPATIENT
Start: 2020-09-16 | End: 2020-09-18 | Stop reason: HOSPADM

## 2020-09-16 RX ORDER — LISINOPRIL 20 MG/1
20 TABLET ORAL 2 TIMES DAILY
Status: DISCONTINUED | OUTPATIENT
Start: 2020-09-16 | End: 2020-09-18 | Stop reason: HOSPADM

## 2020-09-16 RX ORDER — ONDANSETRON 4 MG/1
4 TABLET, ORALLY DISINTEGRATING ORAL EVERY 8 HOURS PRN
Status: DISCONTINUED | OUTPATIENT
Start: 2020-09-16 | End: 2020-09-18 | Stop reason: HOSPADM

## 2020-09-16 RX ORDER — DEXTROSE MONOHYDRATE 25 G/50ML
12.5 INJECTION, SOLUTION INTRAVENOUS PRN
Status: DISCONTINUED | OUTPATIENT
Start: 2020-09-16 | End: 2020-09-18 | Stop reason: HOSPADM

## 2020-09-16 RX ORDER — DEXTROSE, SODIUM CHLORIDE, AND POTASSIUM CHLORIDE 5; .45; .15 G/100ML; G/100ML; G/100ML
INJECTION INTRAVENOUS CONTINUOUS PRN
Status: DISCONTINUED | OUTPATIENT
Start: 2020-09-16 | End: 2020-09-18 | Stop reason: HOSPADM

## 2020-09-16 RX ORDER — ACETAMINOPHEN 650 MG/1
650 SUPPOSITORY RECTAL EVERY 6 HOURS PRN
Status: DISCONTINUED | OUTPATIENT
Start: 2020-09-16 | End: 2020-09-18 | Stop reason: HOSPADM

## 2020-09-16 RX ORDER — ISOSORBIDE MONONITRATE 60 MG/1
120 TABLET, EXTENDED RELEASE ORAL DAILY
Status: DISCONTINUED | OUTPATIENT
Start: 2020-09-16 | End: 2020-09-18 | Stop reason: HOSPADM

## 2020-09-16 RX ORDER — MAGNESIUM SULFATE 1 G/100ML
1 INJECTION INTRAVENOUS PRN
Status: DISCONTINUED | OUTPATIENT
Start: 2020-09-16 | End: 2020-09-18 | Stop reason: HOSPADM

## 2020-09-16 RX ADMIN — CLONIDINE HYDROCHLORIDE 0.3 MG: 0.1 TABLET ORAL at 15:41

## 2020-09-16 RX ADMIN — DEXTROSE MONOHYDRATE 12.5 G: 25 INJECTION, SOLUTION INTRAVENOUS at 22:31

## 2020-09-16 RX ADMIN — SODIUM CHLORIDE 13 UNITS/HR: 9 INJECTION, SOLUTION INTRAVENOUS at 20:15

## 2020-09-16 RX ADMIN — ROPINIROLE HYDROCHLORIDE 4 MG: 4 TABLET, FILM COATED ORAL at 20:52

## 2020-09-16 RX ADMIN — POTASSIUM CHLORIDE 10 MEQ: 7.46 INJECTION, SOLUTION INTRAVENOUS at 19:01

## 2020-09-16 RX ADMIN — POTASSIUM CHLORIDE 10 MEQ: 7.46 INJECTION, SOLUTION INTRAVENOUS at 17:49

## 2020-09-16 RX ADMIN — HYDRALAZINE HYDROCHLORIDE 50 MG: 50 TABLET, FILM COATED ORAL at 22:11

## 2020-09-16 RX ADMIN — LISINOPRIL 20 MG: 20 TABLET ORAL at 20:52

## 2020-09-16 RX ADMIN — INSULIN LISPRO 20 UNITS: 100 INJECTION, SOLUTION INTRAVENOUS; SUBCUTANEOUS at 15:06

## 2020-09-16 RX ADMIN — POTASSIUM CHLORIDE 10 MEQ: 7.46 INJECTION, SOLUTION INTRAVENOUS at 20:52

## 2020-09-16 RX ADMIN — ONDANSETRON 4 MG: 2 INJECTION INTRAMUSCULAR; INTRAVENOUS at 22:36

## 2020-09-16 RX ADMIN — POTASSIUM CHLORIDE 10 MEQ: 7.46 INJECTION, SOLUTION INTRAVENOUS at 22:12

## 2020-09-16 RX ADMIN — POTASSIUM CHLORIDE 10 MEQ: 7.46 INJECTION, SOLUTION INTRAVENOUS at 23:11

## 2020-09-16 RX ADMIN — DEXTROSE MONOHYDRATE 12.5 G: 25 INJECTION, SOLUTION INTRAVENOUS at 23:43

## 2020-09-16 RX ADMIN — HYDRALAZINE HYDROCHLORIDE 50 MG: 50 TABLET, FILM COATED ORAL at 15:45

## 2020-09-16 RX ADMIN — CLONIDINE HYDROCHLORIDE 0.3 MG: 0.1 TABLET ORAL at 22:12

## 2020-09-16 RX ADMIN — HYDROCHLOROTHIAZIDE 25 MG: 25 TABLET ORAL at 15:45

## 2020-09-16 RX ADMIN — SODIUM CHLORIDE: 4.5 INJECTION, SOLUTION INTRAVENOUS at 10:15

## 2020-09-16 RX ADMIN — SODIUM CHLORIDE 10 MG/HR: 9 INJECTION, SOLUTION INTRAVENOUS at 21:38

## 2020-09-16 RX ADMIN — HYDRALAZINE HYDROCHLORIDE 10 MG: 20 INJECTION INTRAMUSCULAR; INTRAVENOUS at 15:19

## 2020-09-16 RX ADMIN — Medication 20 MG: at 17:46

## 2020-09-16 RX ADMIN — SODIUM CHLORIDE 10 MG/HR: 9 INJECTION, SOLUTION INTRAVENOUS at 18:46

## 2020-09-16 RX ADMIN — DULOXETINE 60 MG: 60 CAPSULE, DELAYED RELEASE ORAL at 15:42

## 2020-09-16 RX ADMIN — ISOSORBIDE MONONITRATE 120 MG: 60 TABLET, EXTENDED RELEASE ORAL at 15:41

## 2020-09-16 RX ADMIN — SODIUM CHLORIDE, PRESERVATIVE FREE 10 ML: 5 INJECTION INTRAVENOUS at 21:00

## 2020-09-16 RX ADMIN — SODIUM CHLORIDE: 9 INJECTION, SOLUTION INTRAVENOUS at 14:45

## 2020-09-16 RX ADMIN — SODIUM CHLORIDE 28.8 UNITS/HR: 9 INJECTION, SOLUTION INTRAVENOUS at 15:33

## 2020-09-16 RX ADMIN — SEVELAMER CARBONATE 800 MG: 800 TABLET, FILM COATED ORAL at 15:42

## 2020-09-16 ASSESSMENT — PAIN SCALES - GENERAL
PAINLEVEL_OUTOF10: 0

## 2020-09-16 ASSESSMENT — PAIN - FUNCTIONAL ASSESSMENT: PAIN_FUNCTIONAL_ASSESSMENT: 0-10

## 2020-09-16 NOTE — CONSULTS
Nephrology ([facility]) Consult Note      Patient:  Robyn Dia  YOB: 1970  Date of Service: 9/16/2020  MRN: [id number]   Acct: [de-identified]   Primary Care Physician: ROMINA Ramos  Advance Directive: Full Code  Admit Date: 9/16/2020       Hospital Day: 0  Referring Provider: Reji Hassan MD    Patient independently seen and examined, Chart, Consults, Notes, Operative notes, Labs, Cardiology, and Radiology studies reviewed as available. Chief complaint: Severe hyponatremia/ESRD. Subjective:  Robyn Dia is a 45 y.o. female  whom we were consulted for end-stage renal disease. Patient goes to [facility] dialysis clinic on Tuesday Thursday Saturday. She was electively scheduled for colonoscopy and has drank 64 ounces of GoLYTELY which has very high blood sugar. Incidental finding on blood sugar fingersticks more than 1000 mg of blood sugar. She is currently admitted to ICU. Patient also has serum sodium of 120 mmol. She is currently receiving insulin drip for management of severe hyperglycemia. Patient has stable hemodynamics and feels very well very upset about high blood sugar. Allergies:  Penicillins;  Lamisil advanced [terbinafine]; Stadol [butorphanol]; and Reglan [metoclopramide]    Medicines:  Current Facility-Administered Medications   Medication Dose Route Frequency Provider Last Rate Last Dose    hydrALAZINE (APRESOLINE) injection 10 mg  10 mg Intravenous Q6H PRN Reji Hassan MD        labetalol (NORMODYNE;TRANDATE) injection 20 mg  20 mg Intravenous Q4H PRN Reji Hassan MD        0.9 % sodium chloride infusion   Intravenous Continuous Reji Hassan MD        enoxaparin (LOVENOX) injection 30 mg  30 mg Subcutaneous Q24H Reji Hassan MD        dextrose 50 % IV solution  12.5 g Intravenous PRN Reji Hassan MD        potassium chloride 10 mEq/100 mL IVPB (Peripheral Line)  10 mEq Intravenous PRN Reji Hassan MD        magnesium sulfate 1 g in dextrose 5% 100 mL IVPB  1 g Intravenous PRN Kike Vera MD        sodium phosphate 10 mmol in dextrose 5 % 250 mL IVPB  10 mmol Intravenous PRN Kike Vera MD        Or   [facility] sodium phosphate 15 mmol in dextrose 5 % 250 mL IVPB  15 mmol Intravenous PRN Kike Vera MD        Or   [facility] sodium phosphate 20 mmol in dextrose 5 % 500 mL IVPB  20 mmol Intravenous PRN Kike Vera MD        0.9 % sodium chloride infusion   Intravenous Continuous Kike Vera MD        dextrose 5 % and 0.45 % NaCl with KCl 20 mEq infusion   Intravenous Continuous PRN Kike Vera MD        0.9 % sodium chloride infusion   Intravenous Continuous Kike Vera MD           Past Medical History:  Past Medical History:   Diagnosis Date    Arthritis     [facility] stage 5[facility] [facility]     Closed fracture of right shoulder girdle, with routine healing, subsequent encounter     DM (diabetes mellitus) type II controlled with renal manifestation ([facility] [address] 06/1993    Gastroparesis     GERD (gastroesophageal reflux disease)     Hemodialysis patient ([facility] [address]     dialysis on tues, thur, and sat at [facility]    Hypertension     Hypothyroid     Nasal congestion     recent    Noncompliance with medications     Palliative care patient 10/08/2019    Restless leg syndrome        Past Surgical History:  Past Surgical History:   Procedure Laterality Date    BREAST SURGERY      infected milk gland removed, 2004    CHOLECYSTECTOMY, LAPAROSCOPIC      2008    DIALYSIS FISTULA CREATION Left 1/25/2019    REVISION LEFT UPPER EXTREMITY AV ACCESS WITH LEFT BRACHIAL ARTERY TO LEFT AXILLARY VEIN WITH  INTERPOSITIONAL ARTEGRAFT   performed by Cameron Colon MD [address]  [address]    [address] Right 1/25/2019    INSERTION OF RIGHT INTERNAL JUGULAR HEMODIALYSIS CATHETER performed by Cameron Colon MD at [address]  08/17/2018    1.  Moderately increased stainable iron identified by special stain in Kupffer cells and occasional hepatocytes. Negative for evidence of significant portal or lobular inflammation. Negative for evidence of significant fibrosis    [place] COLONOSCOPY W/BIOPSY SINGLE/MULTIPLE N/A 10/20/2017    Dr Pranay Espinal prep-Tubular AP (-) dysplasia x 2--3 yr recall    [place] EGD TRANSORAL BIOPSY SINGLE/MULTIPLE N/A 2016    Dr Chiqui Thorpe EGD TRANSORAL BIOPSY SINGLE/MULTIPLE N/A 10/20/2017    Dr Sylvester Moran (-)   [address] [facility] Left 2016    fistula by Dr Ahn Current at [address]  10/02/2017    [facility]. Left upper fistulograms/venograms, balloon angioplasty cephalic vein arch [id number] conquest.    VASCULAR SURGERY  2018    FISTULOGRAM    VASCULAR SURGERY  10/29/2018    [facility]JS. Left upper fistulograms/venograms    VASCULAR SURGERY  2018    [facility]JS. Arch aortogram,left upper arteriograms.  VASCULAR SURGERY  2019    SJS. Removal of tunneled dialyis catheter right internal jugular vein.  VASCULAR SURGERY  2019    [facility]JS. Left upper extremity fistulogram including venography to the superior vena cava. Balloon angioplasty left subclavian vein with 10mm x 40mm conquest balloon. Balloon angioplasty mid/proximal upper arm cephalic vein with 62WY x 40mm conquest balloon. Venograms after balloon angioplasty. Stent left mid/proximal upper arm cephalic vein stenosis fluency 10mm x 60mm self-expanding covered stent. Balloon     VASCULAR SURGERY  2019    cont angioplasty stent with 10mm x 40mm conquest balloon. Completion venograms left upper extremity.        Family History  Family History   Problem Relation Age of Onset    Colon Cancer Mother          at 63    Colon Polyps Mother     Lung Cancer Father          at 66    Colon Polyps Father     Stomach Cancer Sister     Breast Cancer Sister     Depression Daughter 25        committed suicide    Liver Cancer Neg Hx     Liver Disease Neg Hx     Esophageal Cancer Neg Hx     Rectal Cancer Neg Hx        Social History  Social History     Socioeconomic History    Marital status: Single     Spouse name: Not on file    Number of children: 2    Years of education: Not on file    Highest education level: Not on file   Occupational History    Not on file   Social Needs    Financial resource strain: Not on file    Food insecurity     Worry: Not on file     Inability: Not on file   [employer] needs     Medical: Not on file     Non-medical: Not on file   Tobacco Use    Smoking status: Current Every Day Smoker     Packs/day: 2.00     Years: 33.00     Pack years: 66.00     Types: Cigarettes    Smokeless tobacco: Never Used    Tobacco comment: NOW at 1/4 PD, started at age 24 and has averaged 2 PPD   Substance and Sexual Activity    Alcohol use: No    Drug use: Not Currently     Types: Marijuana    Sexual activity: Not Currently     Partners: Male   Lifestyle    Physical activity     Days per week: Not on file     Minutes per session: Not on file    Stress: Not on file   Relationships    Social connections     Talks on phone: Not on file     Gets together: Not on file     Attends Worship service: Not on file     Active member of club or organization: Not on file     Attends meetings of clubs or organizations: Not on file     Relationship status: Not on file    Intimate partner violence     Fear of current or ex partner: Not on file     Emotionally abused: Not on file     Physically abused: Not on file     Forced sexual activity: Not on file   Other Topics Concern    Not on file   Social History Narrative    Not on file         Review of Systems:  History obtained from chart review and the patient  General ROS: No fever or chills  Respiratory ROS: No cough, shortness of breath, wheezing  Cardiovascular ROS: No chest pain or palpitations  Gastrointestinal ROS: No abdominal pain or melena  Genito-Urinary ROS: No dysuria or hematuria  Musculoskeletal ROS: No joint pain or swelling   14 point ROS reviewed with the patient and negative except as noted above and in the HPI unless unable to obtain. Objective:  Patient Vitals for the past 24 hrs:   BP Temp Temp src Pulse Resp SpO2 Height Weight   09/16/20 1353 (!) 169/72 98.9 °F (37.2 °C) Temporal 67 16 97 % -- --   09/16/20 1339 (!) 174/68 -- -- 67 22 98 % -- --   09/16/20 1337 (!) 158/81 -- Temporal 67 30 97 % -- 150 lb 4.8 oz (68.2 kg)   09/16/20 1012 (!) 155/78 98.4 °F (36.9 °C) Temporal 65 18 100 % 5' 6\" (1.676 m) 145 lb (65.8 kg)     No intake or output data in the 24 hours ending 09/16/20 1359  General: awake/alert   HEENT: Normocephalic atraumatic head  Neck: Supple with no JVD or carotid bruits. Chest:  clear to auscultation bilaterally  CVS: regular rate and rhythm  Abdominal: soft, nontender, normal bowel sounds  Extremities: no cyanosis or edema  Skin: warm and dry without rash      Labs:  BMP:   Recent Labs     09/16/20  1010   *   K 4.3   CL 76*   CO2 29   BUN 19   CREATININE 3.4*   CALCIUM 9.0     CBC: No results for input(s): WBC, HGB, HCT, MCV, PLT in the last 72 hours. LIVER PROFILE: No results for input(s): AST, ALT, LIPASE, BILIDIR, BILITOT, ALKPHOS in the last 72 hours. Invalid input(s): AMYLASE,  ALB  PT/INR: No results for input(s): PROTIME, INR in the last 72 hours. APTT: No results for input(s): APTT in the last 72 hours. BNP:  No results for input(s): BNP in the last 72 hours. Ionized Calcium:No results for input(s): IONCA in the last 72 hours. Magnesium:No results for input(s): MG in the last 72 hours. Phosphorus:No results for input(s): PHOS in the last 72 hours. HgbA1C: No results for input(s): LABA1C in the last 72 hours. Hepatic: No results for input(s): ALKPHOS, ALT, AST, PROT, BILITOT, BILIDIR, LABALBU in the last 72 hours. Lactic Acid: No results for input(s): LACTA in the last 72 hours.   Troponin: No results for input(s): CKTOTAL, CKMB, TROPONINT in the last 72 hours. ABGs: No results for input(s): PH, PCO2, PO2, HCO3, O2SAT in the last 72 hours. CRP:  No results for input(s): CRP in the last 72 hours. Sed Rate:  No results for input(s): SEDRATE in the last 72 hours. Cultures:   No results for input(s): CULTURE in the last 72 hours. No results for input(s): BC, Kandi Furry in the last 72 hours. No results for input(s): CXSURG in the last 72 hours. Radiology reports as per the Radiologist  Radiology: No results found. Assessment   1. End stage renal disease on maintenance dialysis. 2.  Severe hyperglycemia  3. Pseudohyponatremia. 4.  Type II diabetic nephropathy. 5.  Anemia of chronic kidney disease. 6.  Poorly controlled insulin-dependent diabetes. Plan:  1. Agree with insulin drip. 2.  IV fluid normal saline. 3.  If no improvement of hyper natremia with insulin drip, will consider short dialysis. Thank you for the consult, we appreciate the opportunity to provide care to your patients. Feel free to contact me if I can be of any further assistance.       Alfred Strauss MD  09/16/20  1:59 PM

## 2020-09-16 NOTE — PROGRESS NOTES
Received pt to  from outpatient endoscopy. Bedside report from [facility]. Pt admitted to ICU with a blood glucose of 1,035, sodium 120. Pt states she had sixty-four ounces of GoLytely yesterday for bowel prep. She was scheduled for an outpatient EGD, EUS, and C-scope. Alvaro Cherry, and Thee Yates notified of pt's arrival to unit. Pt is alert and oriented x 4 and without confusion. New labs drawn on arrival to unit. HTN noted, prn bp med orders and orders for insulin gtt received. Other VSS. Will continue to monitor.

## 2020-09-16 NOTE — PROGRESS NOTES
BP  223/58, oral home meds and prn bp meds given. Dr. Julien Bansal updated on bp and some newly resulted labs. Cardene gtt ordered in NS, replacing electrolytes potassium and phos. 2. 5 L removed via HD.

## 2020-09-16 NOTE — PROGRESS NOTES
Pharmacy Renal Adjustment    Jenny Chaidez is a 45 y.o. female. Pharmacy has renally adjusted medications per protocol. Recent Labs     09/16/20  1010   BUN 19       Recent Labs     09/16/20  1010   CREATININE 3.4*       Estimated Creatinine Clearance: 18 mL/min (A) (based on SCr of 3.4 mg/dL (H)). Height:   Ht Readings from Last 1 Encounters:   09/16/20 5' 6\" (1.676 m)     Weight:  Wt Readings from Last 1 Encounters:   09/16/20 150 lb 4.8 oz (68.2 kg)       Plan: Adjust the following medications based on renal function:  Change from Lovenox 40mg sq daily to Lovenox 30mg sq daily.     SHREE BARNES, PHARM D, 9/16/2020, 1:57 PM

## 2020-09-16 NOTE — SIGNIFICANT EVENT
Patient presented today for outpatient endoscopies. Pre-op labs showed significantly elevated Glc and severe hyponatremia. No acidosis noted no AG on BMP. Patient largely asymptomatic other than fatigue.     - D/w Anesthesia regarding risk of proceeding - pls see their note. - I d/w Hospitalist service who recommend admission to ICU for insulin gtt and electrolyte monitoring. We will re-address tomorrow or Friday regarding possibility of procedures as she is already prepped and Covid testing.

## 2020-09-16 NOTE — PLAN OF CARE
Problem: Falls - Risk of:  Goal: Will remain free from falls  Description: Will remain free from falls  Outcome: Ongoing  Goal: Absence of physical injury  Description: Absence of physical injury  Outcome: Ongoing     Problem: Skin Integrity:  Goal: Will show no infection signs and symptoms  Description: Will show no infection signs and symptoms  Outcome: Ongoing  Goal: Absence of new skin breakdown  Description: Absence of new skin breakdown  Outcome: Ongoing     Problem: Discharge Planning:  Goal: Participates in care planning  Description: Participates in care planning  Outcome: Ongoing  Goal: Discharged to appropriate level of care  Description: Discharged to appropriate level of care  Outcome: Ongoing     Problem: Fluid Volume - Imbalance:  Goal: Absence of imbalanced fluid volume signs and symptoms  Description: Absence of imbalanced fluid volume signs and symptoms  Outcome: Ongoing     Problem: Nutrition Deficit:  Goal: Ability to achieve adequate nutritional intake will improve  Description: Ability to achieve adequate nutritional intake will improve  Outcome: Ongoing     Problem: Serum Glucose Level - Abnormal:  Goal: Ability to maintain appropriate glucose levels will improve to within specified parameters  Description: Ability to maintain appropriate glucose levels will improve to within specified parameters  Outcome: Ongoing

## 2020-09-16 NOTE — H&P
[address]    Patient: Ema Montes   : 1970   MRN: [id number]  Code Status: Full Code  PCP: ROMINA Marshall  Date of Service: 2020    Chief Complaint:   Elevated blood sugars    History of Present Illness:   39-year-old female with past medical history as listed below initially presented as an outpatient for routine EGD/colonoscopy. Lab work at that time demonstrated elevated blood sugars and a low sodium level. Endoscopies were canceled and patient was admitted to the intensive care unit. Upon interview and examination, patient states her blood sugars usually run between 180 and 250 so she is not sure what happened this time. Patient states her insulin pump has been functioning without issue. No current complaints.     Review of Systems:   Constitutional: positive for fatigue    Past Medical History:     Past Medical History:   Diagnosis Date    Arthritis     Chronic kidney disease, stage 5, kidney failure ([facility] [address]     Closed fracture of right shoulder girdle, with routine healing, subsequent encounter     DM (diabetes mellitus) type II controlled with renal manifestation ([facility] [address] 1993    Gastroparesis     GERD (gastroesophageal reflux disease)     Hemodialysis patient ([facility] [address]     dialysis on tues, thur, and sat at [facility]    Hypertension     Hypothyroid     Nasal congestion     recent    Noncompliance with medications     Palliative care patient 10/08/2019    Restless leg syndrome          Past Surgical History:     Past Surgical History:   Procedure Laterality Date    BREAST SURGERY      infected milk gland removed, 2004    CHOLECYSTECTOMY, LAPAROSCOPIC          DIALYSIS FISTULA CREATION Left 2019    REVISION LEFT UPPER EXTREMITY AV ACCESS WITH LEFT BRACHIAL ARTERY TO LEFT AXILLARY VEIN WITH  INTERPOSITIONAL ARTEGRAFT   [facility] [address]      [employer] CATHETER Right 2019    INSERTION OF RIGHT INTERNAL JUGULAR HEMODIALYSIS CATHETER performed by Esme Maier MD at [address] [facility] LIVER BIOPSY  2018    1.  Moderately increased stainable iron identified by special stain in Kupffer cells and occasional hepatocytes. Negative for evidence of significant portal or lobular inflammation. Negative for evidence of significant fibrosis    [place] COLONOSCOPY W/BIOPSY SINGLE/MULTIPLE N/A 10/20/2017    Dr Rand Koroma prep-Tubular AP (-) dysplasia x 2--3 yr recall    [place] EGD TRANSORAL BIOPSY SINGLE/MULTIPLE N/A 2016    Dr Lesley Young EGD TRANSORAL BIOPSY SINGLE/MULTIPLE N/A 10/20/2017    Dr Dinesh Silva (-)   [address] [facility] VASCULAR SURGERY Left 2016    fistula by Dr Rigo Arroyo at [address]  10/02/2017    [facility]. Left upper fistulograms/venograms, balloon angioplasty cephalic vein arch [id number] conquest.    VASCULAR SURGERY  2018    FISTULOGRAM    VASCULAR SURGERY  10/29/2018    SJS. Left upper fistulograms/venograms    VASCULAR SURGERY  2018    SJS. Arch aortogram,left upper arteriograms.  VASCULAR SURGERY  2019    SJS. Removal of tunneled dialyis catheter right internal jugular vein.  VASCULAR SURGERY  2019    [facility]JS. Left upper extremity fistulogram including venography to the superior vena cava. Balloon angioplasty left subclavian vein with 10mm x 40mm conquest balloon. Balloon angioplasty mid/proximal upper arm cephalic vein with 29RQ x 40mm conquest balloon. Venograms after balloon angioplasty. Stent left mid/proximal upper arm cephalic vein stenosis fluency 10mm x 60mm self-expanding covered stent. Balloon     VASCULAR SURGERY  2019    cont angioplasty stent with 10mm x 40mm conquest balloon. Completion venograms left upper extremity.         Family History:     Family History   Problem Relation Age of Onset    Colon Cancer Mother          at 63    Colon Polyps Mother     Lung Cancer Father          at 66    Colon Polyps Father     Stomach Cancer Sister     Breast Cancer Sister     Depression Daughter 25        committed suicide    Liver Cancer Neg Hx     Liver Disease Neg Hx     Esophageal Cancer Neg Hx     Rectal Cancer Neg Hx         Social History:     Social History     Socioeconomic History    Marital status: Single     Spouse name: None    Number of children: 2    Years of education: None    Highest education level: None   Occupational History    None   Social Needs    Financial resource strain: None    Food insecurity     Worry: None     Inability: None    Transportation needs     Medical: None     Non-medical: None   Tobacco Use    Smoking status: Current Every Day Smoker     Packs/day: 2.00     Years: 33.00     Pack years: 66.00     Types: Cigarettes    Smokeless tobacco: Never Used    Tobacco comment: NOW at 1/4 PD, started at age 24 and has averaged 2 PPD   Substance and Sexual Activity    Alcohol use: No    Drug use: Not Currently     Types: Marijuana    Sexual activity: Not Currently     Partners: Male   Lifestyle    Physical activity     Days per week: None     Minutes per session: None    Stress: None   Relationships    Social connections     Talks on phone: None     Gets together: None     Attends Taoist service: None     Active member of club or organization: None     Attends meetings of clubs or organizations: None     Relationship status: None    Intimate partner violence     Fear of current or ex partner: None     Emotionally abused: None     Physically abused: None     Forced sexual activity: None   Other Topics Concern    None   Social History Narrative    None       Prior to Admission Medications:   Medications Prior to Admission: linaclotide (LINZESS) 290 MCG CAPS capsule, Take 1 capsule by mouth every morning (before breakfast)  sevelamer (RENVELA) 800 MG tablet, Take 1 tablet by mouth 3 times daily (with meals)  lisinopril (PRINIVIL;ZESTRIL) 20 MG SpO2 97%   BMI 24.26 kg/m²     General: no acute distress  HEENT: normocephalic, atraumatic, dry oral mucosa   Neck: supple, symmetrical, trachea midline   Lungs: clear to auscultation bilaterally   Cardiovascular: s1 and s2 normal  Abdomen: soft, positive bowel sounds, nondistended, nontender  Extremities: no edema or cyanosis   Neuro: aaox3, no focal deficits   Skin: normal color and texture     Recent Results (from the past 72 hour(s))   Pregnancy, Urine    Collection Time: 09/16/20  9:25 AM   Result Value Ref Range    HCG(Urine) Pregnancy Test Negative    Basic Metabolic Panel    Collection Time: 09/16/20 10:10 AM   Result Value Ref Range    Sodium 120 (LL) 136 - 145 mmol/L    Potassium 4.3 3.5 - 5.0 mmol/L    Chloride 76 (L) 98 - 111 mmol/L    CO2 29 22 - 29 mmol/L    Anion Gap 15 7 - 19 mmol/L    Glucose 1,035 (HH) 74 - 109 mg/dL    BUN 19 6 - 20 mg/dL    CREATININE 3.4 (H) 0.5 - 0.9 mg/dL    GFR Non-African American 14 (A) >60    GFR  17 (L) >59    Calcium 9.0 8.6 - 10.0 mg/dL   POCT Glucose    Collection Time: 09/16/20 10:59 AM   Result Value Ref Range    POC Glucose >550 (H) 70 - 99 mg/dl    Performed on [employer]    Comprehensive Metabolic Panel w/ Reflex to MG    Collection Time: 09/16/20  1:50 PM   Result Value Ref Range    Sodium 119 (LL) 136 - 145 mmol/L    Potassium reflex Magnesium 4.9 3.5 - 5.0 mmol/L    Chloride 79 (L) 98 - 111 mmol/L    CO2 27 22 - 29 mmol/L    Anion Gap 13 7 - 19 mmol/L    Glucose 1,020 (HH) 74 - 109 mg/dL    BUN 20 6 - 20 mg/dL    CREATININE 3.4 (H) 0.5 - 0.9 mg/dL    GFR Non-African American 14 (A) >60    GFR  17 (L) >59    Calcium 8.1 (L) 8.6 - 10.0 mg/dL    Total Protein 5.9 (L) 6.6 - 8.7 g/dL    Alb 3.4 (L) 3.5 - 5.2 g/dL    Total Bilirubin 0.5 0.2 - 1.2 mg/dL    Alkaline Phosphatase 107 (H) 35 - 104 U/L    ALT 21 5 - 33 U/L    AST 20 5 - 32 U/L   Magnesium    Collection Time: 09/16/20  1:50 PM   Result Value Ref Range    Magnesium 3.0 (H) 1.6 - 2.6 mg/dL   Phosphorus    Collection Time: 09/16/20  1:50 PM   Result Value Ref Range    Phosphorus 3.6 2.5 - 4.5 mg/dL   CBC    Collection Time: 09/16/20  1:50 PM   Result Value Ref Range    WBC 5.6 4.8 - 10.8 K/uL    RBC 2.84 (L) 4.20 - 5.40 M/uL    Hemoglobin 9.1 (L) 12.0 - 16.0 g/dL    Hematocrit 27.5 (L) 37.0 - 47.0 %    MCV 96.8 81.0 - 99.0 fL    MCH 32.0 (H) 27.0 - 31.0 pg    MCHC 33.1 33.0 - 37.0 g/dL    RDW 14.1 11.5 - 14.5 %    Platelets 95 (L) 941 - 400 K/uL    MPV 10.3 9.4 - 12.3 fL   PTH, Intact    Collection Time: 09/16/20  1:50 PM   Result Value Ref Range    .8 (H) 15.0 - 65.0 pg/mL       No intake/output data recorded. No results found.     Assessment and Plan:   1) hyperosmolar nonketotic hyperglycemia  -insulin gtt  -ivf   -bmp/mg/phos q4h  -frequent accuchecks  -insulin pump off at this time     2) hyponatremia  -2/2 above  -corrected na 135  -follow     3) esrd   -renal following  -ongoing hd  -follow renal fxn/uop/lytes  -avoid offending agents     4) dvt ppx  -scds    Total time spent managing the care of this patient: 70 minutes    Jen Steven MD  9/16/2020 3:19 PM

## 2020-09-17 ENCOUNTER — ANESTHESIA (OUTPATIENT)
Dept: ENDOSCOPY | Age: 50
DRG: 637 | End: 2020-09-17
Payer: MEDICARE

## 2020-09-17 ENCOUNTER — ANESTHESIA EVENT (OUTPATIENT)
Dept: ENDOSCOPY | Age: 50
DRG: 637 | End: 2020-09-17
Payer: MEDICARE

## 2020-09-17 VITALS
OXYGEN SATURATION: 95 % | SYSTOLIC BLOOD PRESSURE: 151 MMHG | DIASTOLIC BLOOD PRESSURE: 57 MMHG | RESPIRATION RATE: 23 BRPM

## 2020-09-17 LAB
ALBUMIN SERPL-MCNC: 3.1 G/DL (ref 3.5–5.2)
ALP BLD-CCNC: 89 U/L (ref 35–104)
ALT SERPL-CCNC: 16 U/L (ref 5–33)
ANION GAP SERPL CALCULATED.3IONS-SCNC: 9 MMOL/L (ref 7–19)
AST SERPL-CCNC: 16 U/L (ref 5–32)
BASOPHILS ABSOLUTE: 0.1 K/UL (ref 0–0.2)
BASOPHILS RELATIVE PERCENT: 1.2 % (ref 0–1)
BILIRUB SERPL-MCNC: 0.3 MG/DL (ref 0.2–1.2)
BUN BLDV-MCNC: 12 MG/DL (ref 6–20)
CALCIUM SERPL-MCNC: 8.1 MG/DL (ref 8.6–10)
CHLORIDE BLD-SCNC: 94 MMOL/L (ref 98–111)
CO2: 28 MMOL/L (ref 22–29)
CREAT SERPL-MCNC: 2.9 MG/DL (ref 0.5–0.9)
EOSINOPHILS ABSOLUTE: 0.2 K/UL (ref 0–0.6)
EOSINOPHILS RELATIVE PERCENT: 2.5 % (ref 0–5)
GFR AFRICAN AMERICAN: 21
GFR NON-AFRICAN AMERICAN: 17
GLUCOSE BLD-MCNC: 109 MG/DL (ref 70–99)
GLUCOSE BLD-MCNC: 117 MG/DL (ref 70–99)
GLUCOSE BLD-MCNC: 121 MG/DL (ref 70–99)
GLUCOSE BLD-MCNC: 128 MG/DL (ref 74–109)
GLUCOSE BLD-MCNC: 129 MG/DL (ref 70–99)
GLUCOSE BLD-MCNC: 133 MG/DL (ref 70–99)
GLUCOSE BLD-MCNC: 136 MG/DL (ref 70–99)
GLUCOSE BLD-MCNC: 154 MG/DL (ref 70–99)
GLUCOSE BLD-MCNC: 250 MG/DL (ref 70–99)
GLUCOSE BLD-MCNC: 62 MG/DL (ref 70–99)
GLUCOSE BLD-MCNC: 74 MG/DL (ref 70–99)
GLUCOSE BLD-MCNC: 81 MG/DL (ref 70–99)
HBA1C MFR BLD: 11.8 % (ref 4–6)
HCT VFR BLD CALC: 26 % (ref 37–47)
HEMOGLOBIN: 9 G/DL (ref 12–16)
IMMATURE GRANULOCYTES #: 0 K/UL
LYMPHOCYTES ABSOLUTE: 1.1 K/UL (ref 1.1–4.5)
LYMPHOCYTES RELATIVE PERCENT: 16.3 % (ref 20–40)
MAGNESIUM: 2.5 MG/DL (ref 1.6–2.6)
MCH RBC QN AUTO: 32.7 PG (ref 27–31)
MCHC RBC AUTO-ENTMCNC: 34.6 G/DL (ref 33–37)
MCV RBC AUTO: 94.5 FL (ref 81–99)
MONOCYTES ABSOLUTE: 0.4 K/UL (ref 0–0.9)
MONOCYTES RELATIVE PERCENT: 6.5 % (ref 0–10)
NEUTROPHILS ABSOLUTE: 4.8 K/UL (ref 1.5–7.5)
NEUTROPHILS RELATIVE PERCENT: 73.3 % (ref 50–65)
PDW BLD-RTO: 14.4 % (ref 11.5–14.5)
PERFORMED ON: ABNORMAL
PERFORMED ON: NORMAL
PERFORMED ON: NORMAL
PHOSPHORUS: 3.1 MG/DL (ref 2.5–4.5)
PLATELET # BLD: 101 K/UL (ref 130–400)
PMV BLD AUTO: 9.5 FL (ref 9.4–12.3)
POTASSIUM SERPL-SCNC: 4.8 MMOL/L (ref 3.5–5)
RBC # BLD: 2.75 M/UL (ref 4.2–5.4)
SODIUM BLD-SCNC: 131 MMOL/L (ref 136–145)
TOTAL PROTEIN: 5.4 G/DL (ref 6.6–8.7)
WBC # BLD: 6.5 K/UL (ref 4.8–10.8)

## 2020-09-17 PROCEDURE — 3609027000 HC COLONOSCOPY: Performed by: INTERNAL MEDICINE

## 2020-09-17 PROCEDURE — 80053 COMPREHEN METABOLIC PANEL: CPT

## 2020-09-17 PROCEDURE — 2000000000 HC ICU R&B

## 2020-09-17 PROCEDURE — 6360000002 HC RX W HCPCS: Performed by: NURSE ANESTHETIST, CERTIFIED REGISTERED

## 2020-09-17 PROCEDURE — 6370000000 HC RX 637 (ALT 250 FOR IP): Performed by: INTERNAL MEDICINE

## 2020-09-17 PROCEDURE — 83036 HEMOGLOBIN GLYCOSYLATED A1C: CPT

## 2020-09-17 PROCEDURE — 99221 1ST HOSP IP/OBS SF/LOW 40: CPT | Performed by: INTERNAL MEDICINE

## 2020-09-17 PROCEDURE — 2500000003 HC RX 250 WO HCPCS: Performed by: NURSE ANESTHETIST, CERTIFIED REGISTERED

## 2020-09-17 PROCEDURE — 2580000003 HC RX 258: Performed by: INTERNAL MEDICINE

## 2020-09-17 PROCEDURE — 2500000003 HC RX 250 WO HCPCS: Performed by: INTERNAL MEDICINE

## 2020-09-17 PROCEDURE — 36415 COLL VENOUS BLD VENIPUNCTURE: CPT

## 2020-09-17 PROCEDURE — 8010000000 HC HEMODIALYSIS ACUTE INPT

## 2020-09-17 PROCEDURE — 2580000003 HC RX 258: Performed by: NURSE ANESTHETIST, CERTIFIED REGISTERED

## 2020-09-17 PROCEDURE — 2709999900 HC NON-CHARGEABLE SUPPLY: Performed by: INTERNAL MEDICINE

## 2020-09-17 PROCEDURE — 82947 ASSAY GLUCOSE BLOOD QUANT: CPT

## 2020-09-17 PROCEDURE — 3700000001 HC ADD 15 MINUTES (ANESTHESIA): Performed by: INTERNAL MEDICINE

## 2020-09-17 PROCEDURE — 6360000002 HC RX W HCPCS: Performed by: INTERNAL MEDICINE

## 2020-09-17 PROCEDURE — 85025 COMPLETE CBC W/AUTO DIFF WBC: CPT

## 2020-09-17 PROCEDURE — 2580000003 HC RX 258: Performed by: HOSPITALIST

## 2020-09-17 PROCEDURE — 84100 ASSAY OF PHOSPHORUS: CPT

## 2020-09-17 PROCEDURE — 0DJD8ZZ INSPECTION OF LOWER INTESTINAL TRACT, VIA NATURAL OR ARTIFICIAL OPENING ENDOSCOPIC: ICD-10-PCS | Performed by: INTERNAL MEDICINE

## 2020-09-17 PROCEDURE — 83735 ASSAY OF MAGNESIUM: CPT

## 2020-09-17 PROCEDURE — 3700000000 HC ANESTHESIA ATTENDED CARE: Performed by: INTERNAL MEDICINE

## 2020-09-17 RX ORDER — PROMETHAZINE HYDROCHLORIDE 25 MG/ML
6.25 INJECTION, SOLUTION INTRAMUSCULAR; INTRAVENOUS
Status: DISCONTINUED | OUTPATIENT
Start: 2020-09-17 | End: 2020-09-17 | Stop reason: HOSPADM

## 2020-09-17 RX ORDER — SODIUM CHLORIDE 9 MG/ML
INJECTION, SOLUTION INTRAVENOUS CONTINUOUS PRN
Status: DISCONTINUED | OUTPATIENT
Start: 2020-09-17 | End: 2020-09-17 | Stop reason: SDUPTHER

## 2020-09-17 RX ORDER — DEXTROSE MONOHYDRATE 25 G/50ML
12.5 INJECTION, SOLUTION INTRAVENOUS PRN
Status: DISCONTINUED | OUTPATIENT
Start: 2020-09-17 | End: 2020-09-18 | Stop reason: HOSPADM

## 2020-09-17 RX ORDER — LIDOCAINE HYDROCHLORIDE 10 MG/ML
INJECTION, SOLUTION EPIDURAL; INFILTRATION; INTRACAUDAL; PERINEURAL PRN
Status: DISCONTINUED | OUTPATIENT
Start: 2020-09-17 | End: 2020-09-17 | Stop reason: SDUPTHER

## 2020-09-17 RX ORDER — DEXTROSE AND SODIUM CHLORIDE 5; .9 G/100ML; G/100ML
INJECTION, SOLUTION INTRAVENOUS CONTINUOUS
Status: DISCONTINUED | OUTPATIENT
Start: 2020-09-17 | End: 2020-09-17

## 2020-09-17 RX ORDER — DEXTROSE MONOHYDRATE 50 MG/ML
100 INJECTION, SOLUTION INTRAVENOUS PRN
Status: DISCONTINUED | OUTPATIENT
Start: 2020-09-17 | End: 2020-09-18 | Stop reason: HOSPADM

## 2020-09-17 RX ORDER — DIPHENHYDRAMINE HYDROCHLORIDE 50 MG/ML
12.5 INJECTION INTRAMUSCULAR; INTRAVENOUS
Status: DISCONTINUED | OUTPATIENT
Start: 2020-09-17 | End: 2020-09-17 | Stop reason: HOSPADM

## 2020-09-17 RX ORDER — INSULIN GLARGINE 100 [IU]/ML
5 INJECTION, SOLUTION SUBCUTANEOUS NIGHTLY
Status: DISCONTINUED | OUTPATIENT
Start: 2020-09-17 | End: 2020-09-18 | Stop reason: HOSPADM

## 2020-09-17 RX ORDER — PROPOFOL 10 MG/ML
INJECTION, EMULSION INTRAVENOUS PRN
Status: DISCONTINUED | OUTPATIENT
Start: 2020-09-17 | End: 2020-09-17 | Stop reason: SDUPTHER

## 2020-09-17 RX ORDER — NICOTINE POLACRILEX 4 MG
15 LOZENGE BUCCAL PRN
Status: DISCONTINUED | OUTPATIENT
Start: 2020-09-17 | End: 2020-09-18 | Stop reason: HOSPADM

## 2020-09-17 RX ORDER — ONDANSETRON 2 MG/ML
4 INJECTION INTRAMUSCULAR; INTRAVENOUS
Status: DISCONTINUED | OUTPATIENT
Start: 2020-09-17 | End: 2020-09-17 | Stop reason: HOSPADM

## 2020-09-17 RX ADMIN — SODIUM CHLORIDE: 9 INJECTION, SOLUTION INTRAVENOUS at 15:36

## 2020-09-17 RX ADMIN — PROPOFOL 100 MG: 10 INJECTION, EMULSION INTRAVENOUS at 15:49

## 2020-09-17 RX ADMIN — PROPOFOL 200 MG: 10 INJECTION, EMULSION INTRAVENOUS at 15:38

## 2020-09-17 RX ADMIN — SEVELAMER CARBONATE 800 MG: 800 TABLET, FILM COATED ORAL at 12:14

## 2020-09-17 RX ADMIN — LISINOPRIL 20 MG: 20 TABLET ORAL at 21:07

## 2020-09-17 RX ADMIN — DEXTROSE MONOHYDRATE 12.5 G: 25 INJECTION, SOLUTION INTRAVENOUS at 03:10

## 2020-09-17 RX ADMIN — SODIUM CHLORIDE: 9 INJECTION, SOLUTION INTRAVENOUS at 02:26

## 2020-09-17 RX ADMIN — ONDANSETRON 4 MG: 2 INJECTION INTRAMUSCULAR; INTRAVENOUS at 06:14

## 2020-09-17 RX ADMIN — SEVELAMER CARBONATE 800 MG: 800 TABLET, FILM COATED ORAL at 17:08

## 2020-09-17 RX ADMIN — SODIUM PHOSPHATE, MONOBASIC, MONOHYDRATE 20 MMOL: 276; 142 INJECTION, SOLUTION INTRAVENOUS at 00:05

## 2020-09-17 RX ADMIN — POTASSIUM CHLORIDE 10 MEQ: 7.46 INJECTION, SOLUTION INTRAVENOUS at 00:15

## 2020-09-17 RX ADMIN — LIDOCAINE HYDROCHLORIDE 5 ML: 10 INJECTION, SOLUTION EPIDURAL; INFILTRATION; INTRACAUDAL; PERINEURAL at 15:38

## 2020-09-17 RX ADMIN — DULOXETINE 60 MG: 60 CAPSULE, DELAYED RELEASE ORAL at 12:16

## 2020-09-17 RX ADMIN — METOPROLOL SUCCINATE 100 MG: 50 TABLET, EXTENDED RELEASE ORAL at 06:14

## 2020-09-17 RX ADMIN — ATORVASTATIN CALCIUM 20 MG: 20 TABLET, FILM COATED ORAL at 12:13

## 2020-09-17 RX ADMIN — ISOSORBIDE MONONITRATE 120 MG: 60 TABLET, EXTENDED RELEASE ORAL at 12:15

## 2020-09-17 RX ADMIN — POTASSIUM CHLORIDE 10 MEQ: 7.46 INJECTION, SOLUTION INTRAVENOUS at 02:24

## 2020-09-17 RX ADMIN — DEXTROSE AND SODIUM CHLORIDE: 5; 900 INJECTION, SOLUTION INTRAVENOUS at 03:54

## 2020-09-17 RX ADMIN — HYDRALAZINE HYDROCHLORIDE 50 MG: 50 TABLET, FILM COATED ORAL at 21:07

## 2020-09-17 RX ADMIN — SODIUM CHLORIDE 5 MG/HR: 9 INJECTION, SOLUTION INTRAVENOUS at 19:46

## 2020-09-17 RX ADMIN — NIFEDIPINE 60 MG: 60 TABLET, FILM COATED, EXTENDED RELEASE ORAL at 12:14

## 2020-09-17 RX ADMIN — PROPOFOL 100 MG: 10 INJECTION, EMULSION INTRAVENOUS at 16:00

## 2020-09-17 RX ADMIN — HYDROCHLOROTHIAZIDE 25 MG: 25 TABLET ORAL at 12:14

## 2020-09-17 RX ADMIN — LISINOPRIL 20 MG: 20 TABLET ORAL at 12:15

## 2020-09-17 RX ADMIN — SODIUM CHLORIDE, PRESERVATIVE FREE 10 ML: 5 INJECTION INTRAVENOUS at 12:15

## 2020-09-17 RX ADMIN — CLONIDINE HYDROCHLORIDE 0.3 MG: 0.1 TABLET ORAL at 21:07

## 2020-09-17 RX ADMIN — CLONIDINE HYDROCHLORIDE 0.3 MG: 0.1 TABLET ORAL at 06:13

## 2020-09-17 RX ADMIN — LEVOTHYROXINE SODIUM 150 MCG: 75 TABLET ORAL at 06:13

## 2020-09-17 RX ADMIN — SODIUM CHLORIDE 5 MG/HR: 9 INJECTION, SOLUTION INTRAVENOUS at 07:55

## 2020-09-17 RX ADMIN — SODIUM CHLORIDE 7.5 MG/HR: 9 INJECTION, SOLUTION INTRAVENOUS at 00:19

## 2020-09-17 RX ADMIN — Medication 5 UNITS: at 12:24

## 2020-09-17 RX ADMIN — HYDRALAZINE HYDROCHLORIDE 50 MG: 50 TABLET, FILM COATED ORAL at 17:08

## 2020-09-17 RX ADMIN — POTASSIUM CHLORIDE 10 MEQ: 7.46 INJECTION, SOLUTION INTRAVENOUS at 01:18

## 2020-09-17 RX ADMIN — ROPINIROLE HYDROCHLORIDE 4 MG: 4 TABLET, FILM COATED ORAL at 21:07

## 2020-09-17 RX ADMIN — CLONIDINE HYDROCHLORIDE 0.3 MG: 0.1 TABLET ORAL at 17:07

## 2020-09-17 RX ADMIN — INSULIN LISPRO 2 UNITS: 100 INJECTION, SOLUTION INTRAVENOUS; SUBCUTANEOUS at 21:07

## 2020-09-17 RX ADMIN — SODIUM CHLORIDE, PRESERVATIVE FREE 10 ML: 5 INJECTION INTRAVENOUS at 21:10

## 2020-09-17 RX ADMIN — SODIUM CHLORIDE 5 MG/HR: 9 INJECTION, SOLUTION INTRAVENOUS at 14:25

## 2020-09-17 RX ADMIN — HYDRALAZINE HYDROCHLORIDE 50 MG: 50 TABLET, FILM COATED ORAL at 06:14

## 2020-09-17 ASSESSMENT — PAIN SCALES - GENERAL
PAINLEVEL_OUTOF10: 0

## 2020-09-17 ASSESSMENT — LIFESTYLE VARIABLES: SMOKING_STATUS: 1

## 2020-09-17 NOTE — PROGRESS NOTES
4 Eyes Skin Assessment    Zeeshan Nicolas is being assessed upon: Transfer to New Unit    I agree that I, Britney Avalos, along with Kathe Vences RN (either 2 RN's or 1 LPN and 1 RN) have performed a thorough Head to Toe Skin Assessment on the patient. ALL assessment sites listed below have been assessed. Areas assessed by both nurses:     [x]   Head, Face, and Ears   [x]   Shoulders, Back, and Chest  [x]   Arms, Elbows, and Hands   [x]   Coccyx, Sacrum, and Ischium  [x]   Legs, Feet, and Heels    Does the Patient have Skin Breakdown? No    Marek Prevention initiated: Yes  Wound Care Orders initiated: No    WOC nurse consulted for Pressure Injury (Stage 3,4, Unstageable, DTI, NWPT, and Complex wounds) and New or Established Ostomies: No        Primary Nurse eSignature:  Britney Avalos RN on 9/16/2020 at 7:08 PM      Co-Signer eSignature: {Esignature:[id number]}

## 2020-09-17 NOTE — ANESTHESIA PRE PROCEDURE
every 8 hours as needed for Nausea or Vomiting    Historical Provider, MD   Ergocalciferol (VITAMIN D2) 10 MCG (400 UNIT) TABS Take 1.25 mg by mouth every 14 days    Historical Provider, MD   senna (SENNA-LAX) 8.6 MG tablet Take 1 tablet by mouth daily    Historical Provider, MD   cloNIDine (CATAPRES) 0.3 MG tablet Take 1 tablet by mouth every 8 hours 10/13/19   Britney Nielsen MD   hydrochlorothiazide (HYDRODIURIL) 25 MG tablet Take 1 tablet by mouth daily 10/14/19   Britney Nielsen MD   insulin detemir (LEVEMIR) 100 UNIT/ML injection vial Indications: Diabetes Through insulin pump.     Historical Provider, MD   insulin aspart (NOVOLOG FLEXPEN) 100 UNIT/ML injection pen Inject 5 Units into the skin 3 times daily     Historical Provider, MD       Current medications:    Current Facility-Administered Medications   Medication Dose Route Frequency Provider Last Rate Last Dose    insulin glargine (LANTUS) injection vial 5 Units  5 Units Subcutaneous Nightly Austin Galicia MD   5 Units at 09/17/20 1224    glucose (GLUTOSE) 40 % oral gel 15 g  15 g Oral PRN Austin Galicia MD        dextrose 50 % IV solution  12.5 g Intravenous PRN Austin Galicia MD        glucagon (rDNA) injection 1 mg  1 mg Intramuscular PRN Austin Galicia MD        dextrose 5 % solution  100 mL/hr Intravenous PRN Austin Galicia MD        insulin lispro (HUMALOG) injection vial 3 Units  3 Units Subcutaneous TID GIANNA Galicia MD        insulin lispro (HUMALOG) injection vial 0-6 Units  0-6 Units Subcutaneous TID GIANNA Galicia MD        insulin lispro (HUMALOG) injection vial 0-3 Units  0-3 Units Subcutaneous Nightly Austin Galicia MD        hydrALAZINE (APRESOLINE) injection 10 mg  10 mg Intravenous Q6H PRN Austin Galicia MD   10 mg at 09/16/20 1519    labetalol (NORMODYNE;TRANDATE) injection 20 mg  20 mg Intravenous Q4H PRN Austin Galicia MD   20 mg at 09/16/20 1746    dextrose 50 % IV solution  12.5 g Intravenous PRN Austin Galicia MD 12.5 g at 09/17/20 0310    potassium chloride 10 mEq/100 mL IVPB (Peripheral Line)  10 mEq Intravenous PRN Keiko Barbour  mL/hr at 09/17/20 0224 10 mEq at 09/17/20 0224    magnesium sulfate 1 g in dextrose 5% 100 mL IVPB  1 g Intravenous PRN Keiko Barbour MD        sodium phosphate 10 mmol in dextrose 5 % 250 mL IVPB  10 mmol Intravenous PRN Keiko Barbour MD        Or    sodium phosphate 15 mmol in dextrose 5 % 250 mL IVPB  15 mmol Intravenous PRN Keiko Barbour MD        Or    sodium phosphate 20 mmol in dextrose 5 % 500 mL IVPB  20 mmol Intravenous PRN Keiko Barbour MD   Stopped at 09/17/20 0605    dextrose 5 % and 0.45 % NaCl with KCl 20 mEq infusion   Intravenous Continuous PRN Keiko aBrbour MD        ipratropium-albuterol (DUONEB) nebulizer solution 1 ampule  1 ampule Inhalation Q4H PRN Keiko Barbour MD        cloNIDine (CATAPRES) tablet 0.3 mg  0.3 mg Oral 3 times per day Keiko Barbour MD   0.3 mg at 09/17/20 7375    DULoxetine (CYMBALTA) extended release capsule 60 mg  60 mg Oral Daily Keiko Barbour MD   60 mg at 09/17/20 1216    hydrALAZINE (APRESOLINE) tablet 50 mg  50 mg Oral 3 times per day Keiko Barbour MD   50 mg at 09/17/20 9877    isosorbide mononitrate (IMDUR) extended release tablet 120 mg  120 mg Oral Daily Keiko Barbour MD   120 mg at 09/17/20 1215    levothyroxine (SYNTHROID) tablet 150 mcg  150 mcg Oral Daily Keiko Barbour MD   150 mcg at 09/17/20 1096    lisinopril (PRINIVIL;ZESTRIL) tablet 20 mg  20 mg Oral BID Keiko Barbour MD   20 mg at 09/17/20 1215    metoprolol succinate (TOPROL XL) extended release tablet 100 mg  100 mg Oral QAM AC Keiko Barbour MD   100 mg at 09/17/20 9023    NIFEdipine (PROCARDIA XL) extended release tablet 60 mg  60 mg Oral Daily Keiko Barbour MD   60 mg at 09/17/20 1214    ondansetron (ZOFRAN-ODT) disintegrating tablet 4 mg  4 mg Oral Q8H PRN Keiko Barbour MD        rOPINIRole (REQUIP) tablet 4 mg  4 mg Oral Nightly Thmoas Jamsion Franklin Deluca MD   4 mg at 09/16/20 2052    sevelamer (RENVELA) tablet 800 mg  800 mg Oral TID WC Nas Aguilar MD   Stopped at 09/17/20 1217    atorvastatin (LIPITOR) tablet 20 mg  20 mg Oral Daily Nas Aguilar MD   20 mg at 09/17/20 1213    hydroCHLOROthiazide (HYDRODIURIL) tablet 25 mg  25 mg Oral Daily Nas Aguilar MD   25 mg at 09/17/20 1214    sodium chloride flush 0.9 % injection 10 mL  10 mL Intravenous 2 times per day Nas Aguilar MD   10 mL at 09/17/20 1215    sodium chloride flush 0.9 % injection 10 mL  10 mL Intravenous PRN Nas Aguilar MD        acetaminophen (TYLENOL) tablet 650 mg  650 mg Oral Q6H PRN Nas Aguilar MD        Or   Kvng [place] acetaminophen (TYLENOL) suppository 650 mg  650 mg Rectal Q6H PRN Nas Aguilar MD        promethazine (PHENERGAN) tablet 12.5 mg  12.5 mg Oral Q6H PRN Nas Aguilar MD        Or    ondansetron [facility]) injection 4 mg  4 mg Intravenous Q6H PRN Nas Aguilar MD   4 mg at 09/17/20 3218    niCARdipine (CARDENE) 25 mg in sodium chloride 0.9 % 250 mL infusion  5 mg/hr Intravenous PRN Nas Aguilar  mL/hr at 09/17/20 1523 10 mg/hr at 09/17/20 1523       Allergies:     Allergies   Allergen Reactions    Penicillins Hives and Shortness Of Breath    Lamisil Advanced [Terbinafine] Nausea And Vomiting    Stadol [Butorphanol] Nausea And Vomiting     And made me feel crazy    Reglan [Metoclopramide] Other (See Comments)     Body twitching and crawling out of her skin feeling       Problem List:    Patient Active Problem List   Diagnosis Code    Gastroesophageal reflux disease without esophagitis K21.9    Acquired hypothyroidism E03.9    Hyperkalemia E87.5    Anemia in chronic kidney disease (CKD) N18.9, D63.1    Iron deficiency E61.1    Family history of colon cancer Z80.0    Unintentional weight loss R63.4    Type 2 diabetes mellitus with chronic kidney disease on chronic dialysis, with long-term current use of insulin (HCC) E11.22, N18.6, Z99.2, Z79.4  Noncompliance of patient with renal dialysis ([address] Z91.15    Respiratory failure (HCC) J96.90    Acute respiratory failure with hypoxia and hypercapnia (HCC) J96.01, J96.02    Dialysis AV fistula malfunction (HCC) T82.590A    Gastroparesis K31.84    Nausea and vomiting R11.2    Chronic constipation K59.09    Uncontrolled type 2 diabetes mellitus with complication, with long-term current use of insulin (HCC) E11.8, E11.65, Z79.4    Abnormal CT of liver R93.2    Other ascites R18.8    ESRD (end stage renal disease) on dialysis (HCC) N18.6, Z99.2    High hepatic iron concentration determined by biopsy of liver E83.19    Steal syndrome as complication of dialysis access (HCC) S45.956Y    PVD (peripheral vascular disease) (HCC) I73.9    ESRD on hemodialysis (HCC) N18.6, Z99.2    Hyponatremia E87.1    Hyperglycemia due to type 2 diabetes mellitus (HCC) E11.65    Normocytic anemia D64.9    Volume overload E87.70    Left homonymous hemianopsia H53.462    Acute intractable headache R51    Accelerated hypertension I10    Medically noncompliant Z91.19    Noncompliance with medications Z91.14    Seizure ([facility] [address] R56.9    Hyperglycemic hyperosmolar nonketotic coma (HCC) E11.01    Type 2 diabetes mellitus with hyperosmolar coma, with long-term current use of insulin (HCC) E11.01, Z79.4    Palliative care patient Z51.5    Hypertensive emergency I16.1    Chronic epigastric pain R10.13, G89.29    Chronic nausea R11.0    Chronic vomiting R11.10    Abnormal CT of the abdomen R93.5    Poor appetite R63.0    Personal history of colonic polyps Z86.010    Hyperosmolar syndrome E87.0       Past Medical History:        Diagnosis Date    Arthritis     Chronic kidney disease, stage 5, kidney failure (HCC)     Closed fracture of right shoulder girdle, with routine healing, subsequent encounter     DM (diabetes mellitus) type II controlled with renal manifestation ([facility] [address] 06/1993    Gastroparesis     GERD (gastroesophageal reflux disease)     Hemodialysis patient ([facility] [address]     dialysis on tues, thur, and sat at [address] Hypertension     Hypothyroid     Nasal congestion     recent    Noncompliance with medications     Palliative care patient 10/08/2019    Restless leg syndrome        Past Surgical History:        Procedure Laterality Date    BREAST SURGERY      infected milk gland removed, 2004    CHOLECYSTECTOMY, LAPAROSCOPIC      2008    DIALYSIS FISTULA CREATION Left 1/25/2019    REVISION LEFT UPPER EXTREMITY AV ACCESS WITH LEFT BRACHIAL ARTERY TO LEFT AXILLARY VEIN WITH  INTERPOSITIONAL ARTEGRAFT   performed by Rocael Franco MD at [facility]  2012    [address] Right 1/25/2019    INSERTION OF RIGHT INTERNAL JUGULAR HEMODIALYSIS CATHETER performed by Rocael Franco MD at [address] [facility]  08/17/2018    1.  Moderately increased stainable iron identified by special stain in Kupffer cells and occasional hepatocytes. Negative for evidence of significant portal or lobular inflammation. Negative for evidence of significant fibrosis    AR COLONOSCOPY W/BIOPSY SINGLE/MULTIPLE N/A 10/20/2017    Dr Naranjo Nails prep-Tubular AP (-) dysplasia x 2--3 yr recall    AR EGD TRANSORAL BIOPSY SINGLE/MULTIPLE N/A 12/27/2016    Dr Alcocer Fearing EGD TRANSORAL BIOPSY SINGLE/MULTIPLE N/A 10/20/2017    Dr Desmond Downs (-)   [facility] VASCULAR SURGERY Left 2016    fistula by Dr Cresencio Bucio at [address]  10/02/2017    SJS. Left upper fistulograms/venograms, balloon angioplasty cephalic vein arch 21K40 conquest.    VASCULAR SURGERY  08/2018    FISTULOGRAM    VASCULAR SURGERY  10/29/2018    [facility]. Left upper fistulograms/venograms    VASCULAR SURGERY  12/19/2018    SJS. Arch aortogram,left upper arteriograms.  VASCULAR SURGERY  03/06/2019    SJS. Removal of tunneled dialyis catheter right internal jugular vein.     VASCULAR SURGERY  08/28/2019 SJS.Left upper extremity fistulogram including venography to the superior vena cava. Balloon angioplasty left subclavian vein with 10mm x 40mm conquest balloon. Balloon angioplasty mid/proximal upper arm cephalic vein with 20KN x 40mm conquest balloon. Venograms after balloon angioplasty. Stent left mid/proximal upper arm cephalic vein stenosis fluency 10mm x 60mm self-expanding covered stent. Balloon     VASCULAR SURGERY  08/28/2019    cont angioplasty stent with 10mm x 40mm conquest balloon. Completion venograms left upper extremity. Social History:    Social History     Tobacco Use    Smoking status: Current Every Day Smoker     Packs/day: 2.00     Years: 33.00     Pack years: 66.00     Types: Cigarettes    Smokeless tobacco: Never Used    Tobacco comment: NOW at 1/4 PD, started at age 24 and has averaged 2 PPD   Substance Use Topics    Alcohol use: No                                Ready to quit: Not Answered  Counseling given: Not Answered  Comment: NOW at 1/4 PD, started at age 24 and has averaged 2 PPD      Vital Signs (Current):   Vitals:    09/17/20 1200 09/17/20 1302 09/17/20 1400 09/17/20 1504   BP: (!) 157/56 (!) 148/59 (!) 145/53 (!) 156/62   Pulse: 62 62 63 62   Resp: 19 17 17 29   Temp: 98.4 °F (36.9 °C)      TempSrc: Temporal      SpO2: 97% 94% 95% 91%   Weight:       Height:                                                  BP Readings from Last 3 Encounters:   09/17/20 (!) 156/62   08/18/20 (!) 179/70   08/12/20 (!) 178/84       NPO Status: Time of last liquid consumption: 0600                        Time of last solid consumption: 1800                        Date of last liquid consumption: 09/16/20                        Date of last solid food consumption: 09/14/20    BMI:   Wt Readings from Last 3 Encounters:   09/17/20 158 lb (71.7 kg)   08/18/20 138 lb (62.6 kg)   08/11/20 133 lb 13.1 oz (60.7 kg)     Body mass index is 25.5 kg/m².     CBC:   Lab Results   Component Value Date    WBC 6.5 09/17/2020    RBC 2.75 09/17/2020    HGB 9.0 09/17/2020    HCT 26.0 09/17/2020    MCV 94.5 09/17/2020    RDW 14.4 09/17/2020     09/17/2020       CMP:   Lab Results   Component Value Date     09/17/2020    K 4.8 09/17/2020    K 4.9 09/16/2020    CL 94 09/17/2020    CO2 28 09/17/2020    BUN 12 09/17/2020    CREATININE 2.9 09/17/2020    GFRAA 21 09/17/2020    LABGLOM 17 09/17/2020    GLUCOSE 128 09/17/2020    PROT 5.4 09/17/2020    CALCIUM 8.1 09/17/2020    BILITOT 0.3 09/17/2020    ALKPHOS 89 09/17/2020    AST 16 09/17/2020    ALT 16 09/17/2020       POC Tests:   Recent Labs     09/17/20  1222   POCGLU 154*       Coags:   Lab Results   Component Value Date    PROTIME 13.8 06/24/2020    INR 1.07 06/24/2020    APTT 27.5 06/24/2020       HCG (If Applicable):   Lab Results   Component Value Date    PREGTESTUR Negative 09/16/2020        ABGs:   Lab Results   Component Value Date    PHART 7.570 06/24/2020    PO2ART 50.0 06/24/2020    WYQ3QZQ 36.0 06/24/2020    QLZ8SGP 33.0 06/24/2020    BEART 10.3 06/24/2020    P0ZJTNBU 88.5 06/24/2020        Type & Screen (If Applicable):  No results found for: LABABO, LABRH    Drug/Infectious Status (If Applicable):  No results found for: HIV, HEPCAB    COVID-19 Screening (If Applicable):   Lab Results   Component Value Date    COVID19 NOT DETECTED 09/12/2020         Anesthesia Evaluation  Patient summary reviewed and Nursing notes reviewed  Airway: Mallampati: II     Neck ROM: full   Dental:          Pulmonary:   (+) current smoker          Patient did not smoke on day of surgery.                  Cardiovascular:  Exercise tolerance: good (>4 METS),   (+) hypertension:,          Beta Blocker:  Dose within 24 Hrs         Neuro/Psych:   (+) seizures: well controlled, headaches:,             GI/Hepatic/Renal:   (+) GERD:, renal disease: ESRD and dialysis,           Endo/Other:    (+) DiabetesType II DM, , hypothyroidism::., .                 Abdominal:           Vascular: Anesthesia Plan      MAC and TIVA     ASA 4       Induction: intravenous. Anesthetic plan and risks discussed with patient.                       ROMINA Gleason - CRNA   9/17/2020

## 2020-09-17 NOTE — PROGRESS NOTES
Hospitalist Progress Note  [facility]     Patient: Sparkle Murguia  : 1970  MRN: [id number]  Code Status: Full Code    Hospital Day: 1   Date of Service: 2020    Subjective:   Pt seen and examined. Ongoing HD. Patient feeling little bit better today. No current complaints. Past Medical History:   Diagnosis Date    Arthritis     Chronic kidney disease, stage 5, kidney failure (HCC)     Closed fracture of right shoulder girdle, with routine healing, subsequent encounter     DM (diabetes mellitus) type II controlled with renal manifestation ([facility] [address] 1993    Gastroparesis     GERD (gastroesophageal reflux disease)     Hemodialysis patient ([facility] [address]     dialysis on tues, th, and sat at [facility]    Hypertension     Hypothyroid     Nasal congestion     recent    Noncompliance with medications     Palliative care patient 10/08/2019    Restless leg syndrome        Past Surgical History:   Procedure Laterality Date    BREAST SURGERY      infected milk gland removed,     CHOLECYSTECTOMY, LAPAROSCOPIC          DIALYSIS FISTULA CREATION Left 2019    REVISION LEFT UPPER EXTREMITY AV ACCESS WITH LEFT BRACHIAL ARTERY TO LEFT AXILLARY VEIN WITH  INTERPOSITIONAL ARTEGRAFT   performed by Lisa Dao MD at [address]  2012    [address] Right 2019    INSERTION OF RIGHT INTERNAL JUGULAR HEMODIALYSIS CATHETER performed by Lisa Dao MD at [address] [facility]  2018    1.  Moderately increased stainable iron identified by special stain in Kupffer cells and occasional hepatocytes. Negative for evidence of significant portal or lobular inflammation.  Negative for evidence of significant fibrosis    IN COLONOSCOPY W/BIOPSY SINGLE/MULTIPLE N/A 10/20/2017    Dr Bob Kessler prep-Tubular AP (-) dysplasia x 2--3 yr recall    IN EGD TRANSORAL BIOPSY SINGLE/MULTIPLE N/A 2016    Dr Millie Gomez EGD TRANSORAL BIOPSY SINGLE/MULTIPLE N/A 10/20/2017    Dr Maldonado Bullock (-)    TUBAL LIGATION          VASCULAR SURGERY Left 2016    fistula by Dr Kaleb Maher at [address]  10/02/2017    [facility]JS. Left upper fistulograms/venograms, balloon angioplasty cephalic vein arch 25S29 conquest.    VASCULAR SURGERY  2018    FISTULOGRAM    VASCULAR SURGERY  10/29/2018    SJS. Left upper fistulograms/venograms    VASCULAR SURGERY  2018    SJS. Arch aortogram,left upper arteriograms.  VASCULAR SURGERY  2019    SJS. Removal of tunneled dialyis catheter right internal jugular vein.  VASCULAR SURGERY  2019    SJS. Left upper extremity fistulogram including venography to the superior vena cava. Balloon angioplasty left subclavian vein with 10mm x 40mm conquest balloon. Balloon angioplasty mid/proximal upper arm cephalic vein with 53ZD x 40mm conquest balloon. Venograms after balloon angioplasty. Stent left mid/proximal upper arm cephalic vein stenosis fluency 10mm x 60mm self-expanding covered stent. Balloon     VASCULAR SURGERY  2019    cont angioplasty stent with 10mm x 40mm conquest balloon. Completion venograms left upper extremity.        Family History   Problem Relation Age of Onset    Colon Cancer Mother          at 63    Colon Polyps Mother     Lung Cancer Father          at 66    Colon Polyps Father     Stomach Cancer Sister     Breast Cancer Sister     Depression Daughter 25        committed suicide    Liver Cancer Neg Hx     Liver Disease Neg Hx     Esophageal Cancer Neg Hx     Rectal Cancer Neg Hx        Social History     Socioeconomic History    Marital status: Single     Spouse name: Not on file    Number of children: 2    Years of education: Not on file    Highest education level: Not on file   Occupational History    Not on file   Social Needs    Financial resource strain: Not on file    Food insecurity     Worry: Not on file     Inability: Not on file   Charito Glass extended release tablet 100 mg  100 mg Oral QAM AC Nirav Conner MD   100 mg at 09/17/20 8198    NIFEdipine (PROCARDIA XL) extended release tablet 60 mg  60 mg Oral Daily Nirav Conner MD        ondansetron (ZOFRAN-ODT) disintegrating tablet 4 mg  4 mg Oral Q8H PRN Nirav Conner MD        rOPINIRole (REQUIP) tablet 4 mg  4 mg Oral Nightly Nirav Conner MD   4 mg at 09/16/20 2052    sevelamer (RENVELA) tablet 800 mg  800 mg Oral TID WC Nirav Conner MD   800 mg at 09/16/20 1542    atorvastatin (LIPITOR) tablet 20 mg  20 mg Oral Daily Nirav Conner MD        hydroCHLOROthiazide (HYDRODIURIL) tablet 25 mg  25 mg Oral Daily Nirav Conner MD   25 mg at 09/16/20 1545    sodium chloride flush 0.9 % injection 10 mL  10 mL Intravenous 2 times per day Nirav Conner MD   10 mL at 09/16/20 2100    sodium chloride flush 0.9 % injection 10 mL  10 mL Intravenous PRN Nirav Conner MD        acetaminophen (TYLENOL) tablet 650 mg  650 mg Oral Q6H PRN Nirav Conner MD        Or   Arben Filter acetaminophen (TYLENOL) suppository 650 mg  650 mg Rectal Q6H PRN Nirav Conner MD        promethazine (PHENERGAN) tablet 12.5 mg  12.5 mg Oral Q6H PRN Nirav Conner MD        Or    ondansetron [facility]) injection 4 mg  4 mg Intravenous Q6H PRN Nirav Conner MD   4 mg at 09/17/20 5091    niCARdipine (CARDENE) 25 mg in sodium chloride 0.9 % 250 mL infusion  5 mg/hr Intravenous PRN Nirav Conner MD   Stopped at 09/17/20 0815         dextrose      dextrose 5% and 0.45% NaCl with KCl 20 mEq          Objective:   BP (!) 166/61   Pulse 63   Temp 97.9 °F (36.6 °C) (Temporal)   Resp 17   Ht 5' 6\" (1.676 m)   Wt 158 lb (71.7 kg)   SpO2 96%   BMI 25.50 kg/m²     General: no acute distress  HEENT: normocephalic, atraumatic  Neck: supple, symmetrical, trachea midline   Lungs: clear to auscultation bilaterally   Cardiovascular: s1 and s2 normal  Abdomen: soft, positive bowel sounds, nondistended, nontender  Extremities: no edema or cyanosis   Neuro: aaox3, no focal deficits   Skin: normal color and texture     Recent Labs     09/16/20  1350 09/17/20  0504   WBC 5.6 6.5   RBC 2.84* 2.75*   HGB 9.1* 9.0*   HCT 27.5* 26.0*   MCV 96.8 94.5   MCH 32.0* 32.7*   MCHC 33.1 34.6   PLT 95* 101*     Recent Labs     09/16/20  1637 09/16/20  2211 09/17/20  0504   * 133* 131*   K 2.8* 3.0* 4.8   ANIONGAP 14 12 9   CL 91* 93* 94*   CO2 27 28 28   BUN 10 11 12   CREATININE 1.9* 2.6* 2.9*   GLUCOSE 447* 60* 128*   CALCIUM 8.2* 8.5* 8.1*     Recent Labs     09/16/20  1637 09/16/20  2211 09/17/20  0504   MG 2.4 2.4 2.5   PHOS 1.4* 1.3* 3.1     Recent Labs     09/16/20  1350 09/17/20  0504   AST 20 16   ALT 21 16   BILITOT 0.5 0.3   ALKPHOS 107* 89     No results for input(s): PH, PO2, PCO2, HCO3, BE, O2SAT in the last 72 hours. No results for input(s): TROPONINI in the last 72 hours. No results for input(s): INR in the last 72 hours. No results for input(s): LACTA in the last 72 hours. Intake/Output Summary (Last 24 hours) at 9/17/2020 0850  Last data filed at 9/17/2020 0600  Gross per 24 hour   Intake 2805.44 ml   Output 750 ml   Net 2055.44 ml       No results found.      Assessment and Plan:   1) hyperosmolar nonketotic hyperglycemia  -insulin gtt, transitioned to sc insulin 9/17/2020  -ivf   -follow lytes  -frequent accuchecks  -insulin pump remains off at this time      2) pseudohyponatremia  -2/2 above  -improved      3) esrd   -renal following  -ongoing hd  -follow renal fxn/uop/lytes  -avoid offending agents      4) dvt ppx  -scds    Reji Hassan MD   9/17/2020 8:50 AM

## 2020-09-17 NOTE — CONSULTS
Palliative Care:   Pt known to palliative care team.  Pt was scheduled for EGD yesterday and found to have a very elevated BS. She was sent to ICU for insulin gtt and electrolyte monitoring. Initiated with report from nurse and notes. Pt resting and receiving HD at present time. Past Medical History:        Past Medical History:   Diagnosis Date    Arthritis     Chronic kidney disease, stage 5, kidney failure (HCC)     Closed fracture of right shoulder girdle, with routine healing, subsequent encounter     DM [facility]di[facility] mellitus) type [facility]    Gastroparesis     GERD (gastroesophageal reflux disease)     Hemodialysis patient ([facility] [address]     dialysis on tues, thur, and sat [facility]    Hypertension     Hypothyroid     Nasal congestion     recent    Noncompliance with medications     Palliative care patient 10/08/2019    Restless leg syndrome        Advance Directives:   Full Code will address AD when pt is not getting HD. Pain/Other Symptoms:   Appears comfortable, resting eyes closed. Activity:   As xavier          Psychological/Spiritual:                Plan:    Monitor electrolytes, HD    Patient/family discussion r/t goals: Will review goals with pt at later time. Nurse reports pt will possible go for EGD after HD complete. Palliative will follow up with visit and ACP discussion.                Electronically signed by Franklin Quintanilla RN on 9/17/2020 at 10:42 AM

## 2020-09-17 NOTE — CONSULTS
GI Consult Note    Pt Name: Sparkle Murguia  MRN: [id number]  [id number]  YOB: 1970  Admit Date: 9/16/2020  9:23 AM  Date of evaluation: 9/17/2020  Primary Care Physician: ROMINA Patrick   [id number]       CC: Abdominal pain, Hx colon polyps    HPI: 50yr female with PMHx ESRD on HD, GERD, HTN, hypothyroidism, and gastroparesis was scheduled for EGD/EUS/Colonoscopy yesterday, but on arrival her glucose was noted to be greater than 1000. She completed her bowel prep prior to coming for her procedure. She was admitted to the ICU for insulin therapy for hyperosmolar nonketotic hyperglycemia. Her glucose levels are now under control. She received dialysis this morning. Since she required hospital setting for her colonoscopy and is already prepped, patient has agreed to proceed with her colonoscopy today. She denies emesis, fever, chills, BRBPR, and melena. She admits to some abdominal discomfort and has a history of colon polyps previously.          Past Medical History:        Diagnosis Date    Arthritis     Chronic kidney disease, stage 5, kidney failure (HCC)     Closed fracture of right shoulder girdle, with routine healing, subsequent encounter     DM (diabetes mellitus) type II controlled with renal manifestation ([facility] [address] 06/1993    Gastroparesis     GERD (gastroesophageal reflux disease)     Hemodialysis patient ([facility] [address]     dialysis on tues, thur, and sat at [facility]    Hypertension     Hypothyroid     Nasal congestion     recent    Noncompliance with medications     Palliative care patient 10/08/2019    Restless leg syndrome      Past Surgical History:        Procedure Laterality Date    BREAST SURGERY      infected milk gland removed, 2004    CHOLECYSTECTOMY, LAPAROSCOPIC      2008    DIALYSIS FISTULA CREATION Left 1/25/2019    REVISION LEFT UPPER EXTREMITY AV ACCESS WITH LEFT BRACHIAL ARTERY TO LEFT AXILLARY VEIN WITH  INTERPOSITIONAL ARTEGRAFT   performed by Pita Vilchis Maddison Aiken MD at [address]  2012    [address] Right 1/25/2019    INSERTION OF RIGHT INTERNAL JUGULAR HEMODIALYSIS CATHETER performed by Nely Talamantes MD at [address]  08/17/2018    1.  Moderately increased stainable iron identified by special stain in Kupffer cells and occasional hepatocytes. Negative for evidence of significant portal or lobular inflammation. Negative for evidence of significant fibrosis    NV COLONOSCOPY W/BIOPSY SINGLE/MULTIPLE N/A 10/20/2017    Dr Pranay Espinal prep-Tubular AP (-) dysplasia x 2--3 yr recall    NV EGD TRANSORAL BIOPSY SINGLE/MULTIPLE N/A 12/27/2016    Dr Chiqui Thorpe EGD TRANSORAL BIOPSY SINGLE/MULTIPLE N/A 10/20/2017    Dr Sylvester Moran (-)   [facility] VASCULAR SURGERY Left 2016    fistula by Dr Anabelle Chaudhary at [address]  10/02/2017    SJS. Left upper fistulograms/venograms, balloon angioplasty cephalic vein arch [id number] conquest.    VASCULAR SURGERY  08/2018    FISTULOGRAM    VASCULAR SURGERY  10/29/2018    SJS. Left upper fistulograms/venograms    VASCULAR SURGERY  12/19/2018    SJS. Arch aortogram,left upper arteriograms.  VASCULAR SURGERY  03/06/2019    SJS. Removal of tunneled dialyis catheter right internal jugular vein.  VASCULAR SURGERY  08/28/2019    SJS. Left upper extremity fistulogram including venography to the superior vena cava. Balloon angioplasty left subclavian vein with 10mm x 40mm conquest balloon. Balloon angioplasty mid/proximal upper arm cephalic vein with 93QW x 40mm conquest balloon. Venograms after balloon angioplasty. Stent left mid/proximal upper arm cephalic vein stenosis fluency 10mm x 60mm self-expanding covered stent. Balloon     VASCULAR SURGERY  08/28/2019    cont angioplasty stent with 10mm x 40mm conquest balloon. Completion venograms left upper extremity.      Social History:   Social History     Tobacco Use    Smoking status: Current Every Day Smoker Packs/day: 2.00     Years: 33.00     Pack years: 66.00     Types: Cigarettes    Smokeless tobacco: Never Used    Tobacco comment: NOW at 1/4 PD, started at age 24 and has averaged 2 PPD   Substance Use Topics    Alcohol use: No     Family History:   Family History   Problem Relation Age of Onset    Colon Cancer Mother          at 61    Colon Polyps Mother     Lung Cancer Father          at 66    Colon Polyps Father     Stomach Cancer Sister     Breast Cancer Sister     Depression Daughter 25        committed suicide    Liver Cancer Neg Hx     Liver Disease Neg Hx     Esophageal Cancer Neg Hx     Rectal Cancer Neg Hx      Home Meds:  Prior to Admission medications    Medication Sig Start Date End Date Taking?  Authorizing Provider   linaclotide Sebas Chance) 290 MCG CAPS capsule Take 1 capsule by mouth every morning (before breakfast) 20  Yes ROMINA Constantino   sevelamer (RENVELA) 800 MG tablet Take 1 tablet by mouth 3 times daily (with meals)   Yes Historical Provider, MD   lisinopril (PRINIVIL;ZESTRIL) 20 MG tablet Take 1 tablet by mouth 2 times daily 19  Yes Bess Unger,    metoprolol succinate (TOPROL XL) 100 MG extended release tablet Take 1 tablet by mouth every morning (before breakfast) 19  Yes Bess Unger DO   hydrALAZINE (APRESOLINE) 50 MG tablet Take 1 tablet by mouth every 8 hours 19  Yes Pancho Ann, DO   albuterol sulfate  (90 Base) MCG/ACT inhaler Inhale 2 puffs into the lungs every 4 hours as needed   Yes Historical Provider, MD   levothyroxine (SYNTHROID) 150 MCG tablet Take 150 mcg by mouth Daily   Yes Historical Provider, MD   NIFEdipine (ADALAT CC) 60 MG extended release tablet Take 60 mg by mouth daily   Yes Historical Provider, MD   isosorbide mononitrate (IMDUR) 120 MG extended release tablet Take 1 tablet by mouth daily 17  Yes Wayne Aquino, DO   DULoxetine (CYMBALTA) 60 MG extended release capsule Take 60 mg by mouth daily   Yes Historical Provider, MD   rOPINIRole (REQUIP) 2 MG tablet Take 4 mg by mouth nightly Indications: Restless Leg Syndrome    Yes Historical Provider, MD   simvastatin (ZOCOR) 40 MG tablet Take 40 mg by mouth nightly    Yes Historical Provider, MD   ondansetron (ZOFRAN-ODT) 4 MG disintegrating tablet Take 4 mg by mouth every 8 hours as needed for Nausea or Vomiting    Historical Provider, MD   Ergocalciferol (VITAMIN D2) 10 MCG (400 UNIT) TABS Take 1.25 mg by mouth every 14 days    Historical Provider, MD   senna (SENNA-LAX) 8.6 MG tablet Take 1 tablet by mouth daily    Historical Provider, MD   cloNIDine (CATAPRES) 0.3 MG tablet Take 1 tablet by mouth every 8 hours 10/13/19   Abner Estrada MD   hydrochlorothiazide (HYDRODIURIL) 25 MG tablet Take 1 tablet by mouth daily 10/14/19   Abner Estrada MD   insulin detemir (LEVEMIR) 100 UNIT/ML injection vial Indications: Diabetes Through insulin pump. Historical Provider, MD   insulin aspart (NOVOLOG FLEXPEN) 100 UNIT/ML injection pen Inject 5 Units into the skin 3 times daily     Historical Provider, MD      Allergies:  Penicillins;  Lamisil advanced [terbinafine]; Stadol [butorphanol]; and Reglan [metoclopramide]      Current Meds:      insulin glargine  5 Units Subcutaneous Nightly    insulin lispro  3 Units Subcutaneous TID WC    insulin lispro  0-6 Units Subcutaneous TID WC    insulin lispro  0-3 Units Subcutaneous Nightly    cloNIDine  0.3 mg Oral 3 times per day    DULoxetine  60 mg Oral Daily    hydrALAZINE  50 mg Oral 3 times per day    isosorbide mononitrate  120 mg Oral Daily    levothyroxine  150 mcg Oral Daily    lisinopril  20 mg Oral BID    metoprolol succinate  100 mg Oral QAM AC    NIFEdipine  60 mg Oral Daily    rOPINIRole  4 mg Oral Nightly    sevelamer  800 mg Oral TID WC    atorvastatin  20 mg Oral Daily    hydroCHLOROthiazide  25 mg Oral Daily    sodium chloride flush  10 mL Intravenous 2 times per day        dextrose      dextrose 5% and 0.45% NaCl with KCl 20 mEq         PRN Meds:  glucose, dextrose, glucagon (rDNA), dextrose, hydrALAZINE, labetalol, dextrose, potassium chloride, magnesium sulfate, sodium phosphate IVPB **OR** sodium phosphate IVPB **OR** sodium phosphate IVPB, dextrose 5% and 0.45% NaCl with KCl 20 mEq, ipratropium-albuterol, ondansetron, sodium chloride flush, acetaminophen **OR** acetaminophen, promethazine **OR** ondansetron, niCARdipine        ROS:  ROS NEGATIVE EXCEPT THOSE MARKED WITH AN \"X\"    GENERAL: [] Fevers, [] chills, [x] generalized weakness, [] weight loss, []weight gain, [] anorexia  Skin/Breast: [] jaundice, [] new rashes, [] itching   Eyes/Ears/Nose/Mouth/Throat: [] change in vision, [] double vision, [] light headiness, [] vertigo  CARDIOVASCULAR: [] chest pain, [] palpitations, [] syncope, [] dyspnea on exertion, [] orthopnea  RESPIRATORY: [] SOB, [] cough, [] wheezing, [] hemoptysis  GI: [] dark stools, [] bloody stools, [] BRBPR, [x] abdominal pain, [] GERD like symptoms, [] nausea, [] vomiting, [] hematemesis, [] jaundice, [] constipation, [] diarrhea, [] hemorrhoids, [] change in bowel habits, [] bowel incontinence  : [] Dysuria, [] urgency, [] frequency, [] change in urine color, [] discharge  MUSCULOSKELETAL: [] muscle pain, [] muscle swelling, [] joint pain, [] muscle weakness  Neurological/Psychiatric: [] Sensory disturbances, [] motor disturbances, [] difficulty with speech, [] paresthesias, [] paralysis, [] depression, [] anxiety   Allergy/Immunological/Lymphatic/Endocrine: [x] anemia, [] rashes, [] polyuria, [] polydypsia      Physical Exam:  Vitals:    09/17/20 1102 09/17/20 1116 09/17/20 1200 09/17/20 1302   BP: (!) 151/58 (!) 151/59 (!) 157/56 (!) 148/59   Pulse: 59 58 62 62   Resp: 18 23 19 17   Temp:   98.4 °F (36.9 °C)    TempSrc:   Temporal    SpO2: 96% 98% 97% 94%   Weight:       Height:           Constitutional: [x] NAD, [x] of stated age, [x] well nourished  Eyes: [x] conjunctiva clear, [x] Non inflamed irises, [x] no scleral icterus   ENT/Mouth: [x]  Nares patent with pink mucosa, [x] oropharynx clear without exudates or erythema, [x] hearing grossly normal  Head/Neck: [x] symmetrical, [x] supple  Lungs: [x] respirations non labored with good effort, [x] no respiratory distress, [x]  Equal air entry bilaterally  Heart: [x] normal S1S2, [x]  Regular rate, [x] pedal pulses preserved 2/4 bilaterally, [x] no LE edema  Abdomen: [x] +BSx4, [x] ND, [x] soft, [x] no guarding, [x] mild mid abdominal tenderness, [x] no peritoneal signs  Muscuoskeletal: [x]  Normal nails bilaterally, [x] Normal digits bilaterally, [x] no muscle atrophy  Skin/SubQ: [x] No jaundice, [x] warm, dry skin, [x] no rashes on inspection  Neurologic: [x]  Sensation grossly intact, [x] no slurred speech, [x]  No focal deficits  Psychiatric: [x]  Orientated to person, place, and time; [x] mood and affect unremarkable, [x] memory recent and remote intact      Labs:     Recent Labs     09/16/20  1350 09/17/20  0504   WBC 5.6 6.5   RBC 2.84* 2.75*   HGB 9.1* 9.0*   HCT 27.5* 26.0*   MCV 96.8 94.5   MCH 32.0* 32.7*   MCHC 33.1 34.6   PLT 95* 101*     Recent Labs     09/16/20  1637 09/16/20 2211 09/17/20  0504   * 133* 131*   K 2.8* 3.0* 4.8   ANIONGAP 14 12 9   CL 91* 93* 94*   CO2 27 28 28   BUN 10 11 12   CREATININE 1.9* 2.6* 2.9*   GLUCOSE 447* 60* 128*   CALCIUM 8.2* 8.5* 8.1*     Recent Labs     09/16/20  1637 09/16/20 2211 09/17/20  0504   MG 2.4 2.4 2.5   PHOS 1.4* 1.3* 3.1     Recent Labs     09/16/20  1350 09/17/20  0504   AST 20 16   ALT 21 16   BILITOT 0.5 0.3   ALKPHOS 107* 89     HgBA1c:  No components found for: HGBA1C  FLP:    Lab Results   Component Value Date    TRIG 39 03/06/2018    HDL 92 03/06/2018    LDLCALC 59 03/06/2018     TSH:    Lab Results   Component Value Date    TSH 17.110 10/08/2019     Troponin T: No results for input(s): TROPONINI in the last 72 hours.   INR: No results for input(s): INR in the last 72 hours. No results for input(s): LIPASE, AMYLASE in the last 72 hours. Radiology:  No results found. Assessment:  1. Hyperosmolar nonketotic hyperglycemia  2. Hx colon polyps  3. Abdominal pain    Plan:  - Pt is resting in the ICU and has not had anything to eat since prior to her colonoscopy prep. Appreciate nursing staff assistance with ensuring she did not have anything to eat which will allow the patient to have a colonoscopy today.  Pt has a history of colon polyps in the past and requires a hospital setting for her colonoscopies, therefore will proceed with colonoscopy today  - continue Harvey Bartlett, DO

## 2020-09-17 NOTE — ANESTHESIA POSTPROCEDURE EVALUATION
Department of Anesthesiology  Postprocedure Note    Patient: Elba Gupta  MRN: [id number]  YOB: 1970  Date of evaluation: 9/17/2020  Time:  4:15 PM     Procedure Summary     Date:  09/17/20 Room / Location:  [address]    Anesthesia Start:  7797 Anesthesia Stop:  7500    Procedure:  COLONOSCOPY DIAGNOSTIC ATTEMPTED 9-17-20 Dr. Rivka Guerrero Abdominal pain (Left ) Diagnosis:  (abdominal pain)    Surgeon:  Olga Holguin DO Responsible Provider:  ROMINA Arellano CRNA    Anesthesia Type:  MAC, TIVA ASA Status:  4          Anesthesia Type: MAC, TIVA    Jane Phase I: Jane Score: 10    Jane Phase II:      Last vitals: Reviewed and per EMR flowsheets.        Anesthesia Post Evaluation    Patient location during evaluation: ICU  Patient participation: waiting for patient participation  Level of consciousness: responsive to physical stimuli  Airway patency: patent  Nausea & Vomiting: no vomiting and no nausea  Complications: no  Cardiovascular status: hemodynamically stable  Respiratory status: spontaneous ventilation and nasal cannula  Hydration status: stable

## 2020-09-17 NOTE — PROGRESS NOTES
Nephrology ([facility]) Progress Note      Patient:  Cheyanne Vick  YOB: 1970  Date of Service: 9/17/2020  MRN: [id number]   Acct: [de-identified]   Primary Care Physician: ROMINA Chou  Advance Directive: Full Code  Admit Date: 9/16/2020       Hospital Day: 1  Referring Provider: Yuliet Melgoza MD    Patient independently seen and examined, Chart, Consults, Notes, Operative notes, Labs, Cardiology, and Radiology studies reviewed as available. Chief complaint: Severe hyponatremia/ESRD. Subjective:  Cheyanne Vick is a 45 y.o. female  whom we were consulted for end-stage renal disease. Patient goes to [facility] dialysis clinic on Tuesday Thursday Saturday. She was electively scheduled for colonoscopy and has drank 64 ounces of GoLYTELY which has very high blood sugar. Incidental finding on blood sugar fingersticks more than 1000 mg of blood sugar. She is currently admitted to ICU. Patient also has serum sodium of 120 mmol. She is currently receiving insulin drip for management of severe hyperglycemia. Patient has stable hemodynamics and feels very well very upset about high blood sugar. She has excellent glucose control and currently off insulin drip. She has received missed hemodialysis treatment yesterday. Today she is getting regular treatment. Currently seen on hemodialysis  Hemodialysis access: AV fistula  Hemodialysis: 3-1/2-hour  Ultrafiltration: 2000 cc  2K bath  Blood flow rate is 450 cc/min. Allergies:  Penicillins;  Lamisil advanced [terbinafine]; Stadol [butorphanol]; and Reglan [metoclopramide]    Medicines:  Current Facility-Administered Medications   Medication Dose Route Frequency Provider Last Rate Last Dose    insulin glargine (LANTUS) injection vial 5 Units  5 Units Subcutaneous Nightly Yuliet Melgoza MD        glucose (GLUTOSE) 40 % oral gel 15 g  15 g Oral PRN Yuliet Melgoza MD        dextrose 50 % IV solution  12.5 g Intravenous day Cathi Beal MD   50 mg at 09/17/20 6521    isosorbide mononitrate (IMDUR) extended release tablet 120 mg  120 mg Oral Daily Cathi Beal MD   120 mg at 09/16/20 1541    levothyroxine (SYNTHROID) tablet 150 mcg  150 mcg Oral Daily Cathi Beal MD   150 mcg at 09/17/20 4741    lisinopril (PRINIVIL;ZESTRIL) tablet 20 mg  20 mg Oral BID Cathi Beal MD   20 mg at 09/16/20 2052    metoprolol succinate (TOPROL XL) extended release tablet 100 mg  100 mg Oral QAM AC Cathi Beal MD   100 mg at 09/17/20 7297    NIFEdipine (PROCARDIA XL) extended release tablet 60 mg  60 mg Oral Daily Cathi Beal MD        ondansetron (ZOFRAN-ODT) disintegrating tablet 4 mg  4 mg Oral Q8H PRN Cathi Beal MD        rOPINIRole (REQUIP) tablet 4 mg  4 mg Oral Nightly Cathi Beal MD   4 mg at 09/16/20 2052    sevelamer (RENVELA) tablet 800 mg  800 mg Oral TID WC Cathi Beal MD   800 mg at 09/16/20 1542    atorvastatin (LIPITOR) tablet 20 mg  20 mg Oral Daily Cathi Beal MD        hydroCHLOROthiazide (HYDRODIURIL) tablet 25 mg  25 mg Oral Daily Cathi Beal MD   25 mg at 09/16/20 1545    sodium chloride flush 0.9 % injection 10 mL  10 mL Intravenous 2 times per day Cathi Beal MD   10 mL at 09/16/20 2100    sodium chloride flush 0.9 % injection 10 mL  10 mL Intravenous PRN Cathi Beal MD        acetaminophen (TYLENOL) tablet 650 mg  650 mg Oral Q6H PRN Cathi Beal MD        Or   Huang acetaminophen (TYLENOL) suppository 650 mg  650 mg Rectal Q6H PRN Cathi Beal MD        promethazine (PHENERGAN) tablet 12.5 mg  12.5 mg Oral Q6H PRN Cathi Beal MD        Or    ondansetron [facility]) injection 4 mg  4 mg Intravenous Q6H PRN Cathi Beal MD   4 mg at 09/17/20 0761    niCARdipine (CARDENE) 25 mg in sodium chloride 0.9 % 250 mL infusion  5 mg/hr Intravenous PRN Cathi Beal MD   Stopped at 09/17/20 0815       Past Medical History:  Past Medical History:   Diagnosis Date    Arthritis     2 PPD   Substance and Sexual Activity    Alcohol use: No    Drug use: Not Currently     Types: Marijuana    Sexual activity: Not Currently     Partners: Male   Lifestyle    Physical activity     Days per week: Not on file     Minutes per session: Not on file    Stress: Not on file   Relationships    Social connections     Talks on phone: Not on file     Gets together: Not on file     Attends [employer] service: Not on file     Active member of club or organization: Not on file     Attends meetings of clubs or organizations: Not on file     Relationship status: Not on file    Intimate partner violence     Fear of current or ex partner: Not on file     Emotionally abused: Not on file     Physically abused: Not on file     Forced sexual activity: Not on file   Other Topics Concern    Not on file   Social History Narrative    Not on file         Review of Systems:  History obtained from chart review and the patient  General ROS: No fever or chills  Respiratory ROS: No cough, shortness of breath, wheezing  Cardiovascular ROS: No chest pain or palpitations  Gastrointestinal ROS: No abdominal pain or melena  Genito-Urinary ROS: No dysuria or hematuria  Musculoskeletal ROS: No joint pain or swelling   14 point ROS reviewed with the patient and negative except as noted above and in the HPI unless unable to obtain.     Objective:  Patient Vitals for the past 24 hrs:   BP Temp Temp src Pulse Resp SpO2 Height Weight   09/17/20 0630 (!) 166/61 -- -- 63 17 96 % -- --   09/17/20 0615 (!) 161/65 -- -- 64 19 96 % -- --   09/17/20 0613 (!) 176/64 -- -- 66 -- -- -- --   09/17/20 0600 (!) 176/64 -- -- 63 20 97 % -- 158 lb (71.7 kg)   09/17/20 0545 (!) 171/64 -- -- 60 17 97 % -- --   09/17/20 0530 (!) 153/62 -- -- 58 17 97 % -- --   09/17/20 0515 (!) 169/61 -- -- 58 17 97 % -- --   09/17/20 0500 (!) 153/64 -- -- 59 17 97 % -- --   09/17/20 0445 (!) 146/66 -- -- 59 20 97 % -- --   09/17/20 0430 (!) 155/69 -- -- 58 17 96 % -- -- 09/17/20 0415 (!) 150/56 -- -- 58 18 96 % -- --   09/17/20 0400 (!) 146/56 -- -- 58 18 97 % -- --   09/17/20 0345 (!) 140/57 -- -- 58 17 97 % -- --   09/17/20 0330 (!) 154/56 -- -- 60 19 97 % -- --   09/17/20 0315 (!) 147/57 -- -- 65 19 97 % -- --   09/17/20 0300 (!) 135/59 -- -- 64 16 96 % -- --   09/17/20 0245 (!) 146/56 -- -- 61 17 95 % -- --   09/17/20 0230 (!) 155/61 -- -- 61 24 95 % -- --   09/17/20 0215 (!) 137/55 -- -- 61 17 95 % -- --   09/17/20 0200 (!) 140/52 -- -- 62 28 96 % -- --   09/17/20 0145 (!) 140/56 -- -- 60 16 95 % -- --   09/17/20 0130 (!) 135/54 -- -- 60 16 95 % -- --   09/17/20 0115 (!) 144/55 -- -- 62 17 95 % -- --   09/17/20 0100 (!) 145/45 97.9 °F (36.6 °C) Temporal 61 19 95 % -- --   09/17/20 0045 (!) 139/56 -- -- 67 16 96 % -- --   09/17/20 0030 (!) 128/55 -- -- 62 16 94 % -- --   09/17/20 0015 (!) 135/55 -- -- 64 19 93 % -- --   09/17/20 0000 (!) 133/54 -- -- 64 18 93 % -- --   09/16/20 2345 (!) 158/58 -- -- 71 26 91 % -- --   09/16/20 2330 (!) 143/56 -- -- 70 21 94 % -- --   09/16/20 2315 (!) 146/57 -- -- 68 26 93 % -- --   09/16/20 2300 (!) 150/59 -- -- 69 22 94 % -- --   09/16/20 2245 (!) 156/57 -- -- 70 20 92 % -- --   09/16/20 2230 (!) 200/73 -- -- 85 21 96 % -- --   09/16/20 2220 (!) 150/55 -- -- 73 19 94 % -- --   09/16/20 2211 (!) 151/64 -- -- -- -- -- -- --   09/16/20 2200 (!) 151/64 -- -- 74 23 96 % -- --   09/16/20 2150 (!) 156/61 -- -- 74 21 94 % -- --   09/16/20 2130 (!) 164/61 -- -- 74 22 96 % -- --   09/16/20 2120 (!) 149/64 -- -- 77 24 97 % -- --   09/16/20 2100 (!) 160/66 -- -- 73 25 94 % -- --   09/16/20 2052 (!) 154/59 -- -- -- -- -- -- --   09/16/20 2050 (!) 154/59 -- -- 77 25 93 % -- --   09/16/20 2030 (!) 154/62 -- -- 76 23 96 % -- --   09/16/20 2020 (!) 143/56 -- -- 79 19 94 % -- --   09/16/20 2000 (!) 151/53 98.9 °F (37.2 °C) Temporal 76 20 94 % -- --   09/16/20 1950 (!) 159/62 -- -- 76 19 94 % -- --   09/16/20 1930 (!) 159/65 -- -- 76 25 97 % -- --   09/16/20 1920 (!) 171/69 -- -- 78 20 97 % -- --   09/16/20 1900 (!) 176/77 -- -- 77 21 96 % -- --   09/16/20 1600 (!) 234/75 98.6 °F (37 °C) Temporal 67 18 100 % -- --   09/16/20 1545 (!) 222/82 -- -- -- -- -- -- --   09/16/20 1541 (!) 222/82 -- -- -- -- -- -- --   09/16/20 1519 (!) 254/84 -- -- -- -- -- -- --   09/16/20 1500 (!) 243/79 -- -- 64 22 98 % -- --   09/16/20 1430 (!) 230/79 -- -- 65 25 98 % -- --   09/16/20 1400 (!) 169/72 -- -- 67 20 97 % -- --   09/16/20 1353 (!) 169/72 98.9 °F (37.2 °C) Temporal 67 16 97 % -- --   09/16/20 1339 (!) 174/68 -- -- 67 22 98 % -- --   09/16/20 1337 (!) 158/81 -- Temporal 67 30 97 % -- 150 lb 4.8 oz (68.2 kg)   09/16/20 1012 (!) 155/78 98.4 °F (36.9 °C) Temporal 65 18 100 % 5' 6\" (1.676 m) 145 lb (65.8 kg)       Intake/Output Summary (Last 24 hours) at 9/17/2020 0908  Last data filed at 9/17/2020 0600  Gross per 24 hour   Intake 2805.44 ml   Output 750 ml   Net 2055.44 ml     General: awake/alert   HEENT: Normocephalic atraumatic head  Neck: Supple with no JVD or carotid bruits. Chest:  clear to auscultation bilaterally  CVS: regular rate and rhythm  Abdominal: soft, nontender, normal bowel sounds  Extremities: no cyanosis or edema  Skin: warm and dry without rash      Labs:  BMP:   Recent Labs     09/16/20  1637 09/16/20  2211 09/17/20  0504   * 133* 131*   K 2.8* 3.0* 4.8   CL 91* 93* 94*   CO2 27 28 28   PHOS 1.4* 1.3* 3.1   BUN 10 11 12   CREATININE 1.9* 2.6* 2.9*   CALCIUM 8.2* 8.5* 8.1*     CBC:   Recent Labs     09/16/20  1350 09/17/20  0504   WBC 5.6 6.5   HGB 9.1* 9.0*   HCT 27.5* 26.0*   MCV 96.8 94.5   PLT 95* 101*     LIVER PROFILE:   Recent Labs     09/16/20  1350 09/17/20  0504   AST 20 16   ALT 21 16   BILITOT 0.5 0.3   ALKPHOS 107* 89     PT/INR: No results for input(s): PROTIME, INR in the last 72 hours. APTT: No results for input(s): APTT in the last 72 hours. BNP:  No results for input(s): BNP in the last 72 hours.   Ionized Calcium:No results for

## 2020-09-18 VITALS
SYSTOLIC BLOOD PRESSURE: 150 MMHG | HEART RATE: 71 BPM | BODY MASS INDEX: 25.09 KG/M2 | OXYGEN SATURATION: 97 % | TEMPERATURE: 98.9 F | DIASTOLIC BLOOD PRESSURE: 67 MMHG | WEIGHT: 156.1 LBS | RESPIRATION RATE: 20 BRPM | HEIGHT: 66 IN

## 2020-09-18 LAB
ALBUMIN SERPL-MCNC: 3.1 G/DL (ref 3.5–5.2)
ALP BLD-CCNC: 90 U/L (ref 35–104)
ALT SERPL-CCNC: 15 U/L (ref 5–33)
ANION GAP SERPL CALCULATED.3IONS-SCNC: 9 MMOL/L (ref 7–19)
AST SERPL-CCNC: 20 U/L (ref 5–32)
BASOPHILS ABSOLUTE: 0.1 K/UL (ref 0–0.2)
BASOPHILS RELATIVE PERCENT: 1.2 % (ref 0–1)
BILIRUB SERPL-MCNC: <0.2 MG/DL (ref 0.2–1.2)
BUN BLDV-MCNC: 8 MG/DL (ref 6–20)
CALCIUM SERPL-MCNC: 8.2 MG/DL (ref 8.6–10)
CHLORIDE BLD-SCNC: 95 MMOL/L (ref 98–111)
CO2: 30 MMOL/L (ref 22–29)
CREAT SERPL-MCNC: 3.1 MG/DL (ref 0.5–0.9)
EOSINOPHILS ABSOLUTE: 0.3 K/UL (ref 0–0.6)
EOSINOPHILS RELATIVE PERCENT: 3.6 % (ref 0–5)
GFR AFRICAN AMERICAN: 19
GFR NON-AFRICAN AMERICAN: 16
GLUCOSE BLD-MCNC: 182 MG/DL (ref 74–109)
GLUCOSE BLD-MCNC: 325 MG/DL (ref 70–99)
HCT VFR BLD CALC: 26.9 % (ref 37–47)
HEMOGLOBIN: 8.8 G/DL (ref 12–16)
IMMATURE GRANULOCYTES #: 0 K/UL
LYMPHOCYTES ABSOLUTE: 1.1 K/UL (ref 1.1–4.5)
LYMPHOCYTES RELATIVE PERCENT: 15.7 % (ref 20–40)
MAGNESIUM: 2.4 MG/DL (ref 1.6–2.6)
MCH RBC QN AUTO: 31.4 PG (ref 27–31)
MCHC RBC AUTO-ENTMCNC: 32.7 G/DL (ref 33–37)
MCV RBC AUTO: 96.1 FL (ref 81–99)
MONOCYTES ABSOLUTE: 0.4 K/UL (ref 0–0.9)
MONOCYTES RELATIVE PERCENT: 5.9 % (ref 0–10)
NEUTROPHILS ABSOLUTE: 5.1 K/UL (ref 1.5–7.5)
NEUTROPHILS RELATIVE PERCENT: 73.3 % (ref 50–65)
PDW BLD-RTO: 14.4 % (ref 11.5–14.5)
PERFORMED ON: ABNORMAL
PLATELET # BLD: 97 K/UL (ref 130–400)
PMV BLD AUTO: 10.1 FL (ref 9.4–12.3)
POTASSIUM SERPL-SCNC: 4.2 MMOL/L (ref 3.5–5)
RBC # BLD: 2.8 M/UL (ref 4.2–5.4)
SODIUM BLD-SCNC: 134 MMOL/L (ref 136–145)
TOTAL PROTEIN: 5.1 G/DL (ref 6.6–8.7)
WBC # BLD: 6.9 K/UL (ref 4.8–10.8)

## 2020-09-18 PROCEDURE — 2500000003 HC RX 250 WO HCPCS: Performed by: INTERNAL MEDICINE

## 2020-09-18 PROCEDURE — 36415 COLL VENOUS BLD VENIPUNCTURE: CPT

## 2020-09-18 PROCEDURE — 6370000000 HC RX 637 (ALT 250 FOR IP): Performed by: INTERNAL MEDICINE

## 2020-09-18 PROCEDURE — 80053 COMPREHEN METABOLIC PANEL: CPT

## 2020-09-18 PROCEDURE — 6360000002 HC RX W HCPCS: Performed by: INTERNAL MEDICINE

## 2020-09-18 PROCEDURE — 2580000003 HC RX 258: Performed by: INTERNAL MEDICINE

## 2020-09-18 PROCEDURE — 85025 COMPLETE CBC W/AUTO DIFF WBC: CPT

## 2020-09-18 PROCEDURE — 82947 ASSAY GLUCOSE BLOOD QUANT: CPT

## 2020-09-18 PROCEDURE — 83735 ASSAY OF MAGNESIUM: CPT

## 2020-09-18 RX ADMIN — INSULIN LISPRO 4 UNITS: 100 INJECTION, SOLUTION INTRAVENOUS; SUBCUTANEOUS at 07:18

## 2020-09-18 RX ADMIN — Medication 20 MG: at 03:33

## 2020-09-18 RX ADMIN — LEVOTHYROXINE SODIUM 150 MCG: 75 TABLET ORAL at 06:51

## 2020-09-18 RX ADMIN — SODIUM CHLORIDE, PRESERVATIVE FREE 10 ML: 5 INJECTION INTRAVENOUS at 07:22

## 2020-09-18 RX ADMIN — ISOSORBIDE MONONITRATE 120 MG: 60 TABLET, EXTENDED RELEASE ORAL at 07:21

## 2020-09-18 RX ADMIN — HYDRALAZINE HYDROCHLORIDE 10 MG: 20 INJECTION INTRAMUSCULAR; INTRAVENOUS at 01:41

## 2020-09-18 RX ADMIN — HYDRALAZINE HYDROCHLORIDE 50 MG: 50 TABLET, FILM COATED ORAL at 05:12

## 2020-09-18 RX ADMIN — LISINOPRIL 20 MG: 20 TABLET ORAL at 07:22

## 2020-09-18 RX ADMIN — CLONIDINE HYDROCHLORIDE 0.3 MG: 0.1 TABLET ORAL at 05:11

## 2020-09-18 RX ADMIN — HYDROCHLOROTHIAZIDE 25 MG: 25 TABLET ORAL at 07:21

## 2020-09-18 RX ADMIN — METOPROLOL SUCCINATE 100 MG: 50 TABLET, EXTENDED RELEASE ORAL at 06:50

## 2020-09-18 RX ADMIN — SEVELAMER CARBONATE 800 MG: 800 TABLET, FILM COATED ORAL at 07:21

## 2020-09-18 RX ADMIN — DULOXETINE 60 MG: 60 CAPSULE, DELAYED RELEASE ORAL at 07:21

## 2020-09-18 RX ADMIN — NIFEDIPINE 60 MG: 60 TABLET, FILM COATED, EXTENDED RELEASE ORAL at 07:21

## 2020-09-18 RX ADMIN — ATORVASTATIN CALCIUM 20 MG: 20 TABLET, FILM COATED ORAL at 07:21

## 2020-09-18 ASSESSMENT — PAIN SCALES - GENERAL
PAINLEVEL_OUTOF10: 0

## 2020-09-18 NOTE — CARE COORDINATION
Cab voucher sent to ICU as Pt doesn't have a ride home.     Electronically signed by RUBEN Brooks on 9/18/2020 at 9:11 AM

## 2020-09-18 NOTE — DISCHARGE SUMMARY
Hospitalist Discharge Summary  [address]    Patient: Hari Koroma  : 1970  MRN: [id number]  Code Status: Full Code  PCP: ROMINA Pritchett  Attending: Marley Ponce MD  Admission Date: 2020  9:23 AM  Discharge Date:  2020  Length of Stay: 2 days    Discharge Diagnoses:   1) hyperosmolar nonketotic hyperglycemia  2) pseudohyponatremia  3) esrd     Discharge Medications:      Massimoashlie Light   Home Medication Instructions QUB:[id number]    Printed on:20 0750   Medication Information                      albuterol sulfate  (90 Base) MCG/ACT inhaler  Inhale 2 puffs into the lungs every 4 hours as needed             cloNIDine (CATAPRES) 0.3 MG tablet  Take 1 tablet by mouth every 8 hours             DULoxetine (CYMBALTA) 60 MG extended release capsule  Take 60 mg by mouth daily             Ergocalciferol (VITAMIN D2) 10 MCG (400 UNIT) TABS  Take 1.25 mg by mouth every 14 days             hydrALAZINE (APRESOLINE) 50 MG tablet  Take 1 tablet by mouth every 8 hours             hydrochlorothiazide (HYDRODIURIL) 25 MG tablet  Take 1 tablet by mouth daily             insulin aspart (NOVOLOG FLEXPEN) 100 UNIT/ML injection pen  Inject 5 Units into the skin 3 times daily              insulin detemir (LEVEMIR) 100 UNIT/ML injection vial  Indications: Diabetes Through insulin pump.             isosorbide mononitrate (IMDUR) 120 MG extended release tablet  Take 1 tablet by mouth daily             levothyroxine (SYNTHROID) 150 MCG tablet  Take 150 mcg by mouth Daily             linaclotide (LINZESS) 290 MCG CAPS capsule  Take 1 capsule by mouth every morning (before breakfast)             lisinopril (PRINIVIL;ZESTRIL) 20 MG tablet  Take 1 tablet by mouth 2 times daily             metoprolol succinate (TOPROL XL) 100 MG extended release tablet  Take 1 tablet by mouth every morning (before breakfast)             NIFEdipine (ADALAT CC) 60 MG extended release tablet  Take 60 mg by mouth daily             ondansetron (ZOFRAN-ODT) 4 MG disintegrating tablet  Take 4 mg by mouth every 8 hours as needed for Nausea or Vomiting             rOPINIRole (REQUIP) 2 MG tablet  Take 4 mg by mouth nightly Indications: Restless Leg Syndrome              senna (SENNA-LAX) 8.6 MG tablet  Take 1 tablet by mouth daily             sevelamer (RENVELA) 800 MG tablet  Take 1 tablet by mouth 3 times daily (with meals)             simvastatin (ZOCOR) 40 MG tablet  Take 40 mg by mouth nightly                  Discharge Instructions:    Activity: activity as tolerated  Diet: cardiac diet, diabetic diet and renal diet    Recommended Follow Up:  Ashley Michelle, APRN  [address] [facility]  [address] [facility]  [address] [facility]  [address] [facility] [place] [address]  [phone]  Schedule an appointment as soon as possible for a visit      Renee Crawford MD  [place] [address]  [phone]    Schedule an appointment as soon as possible for a visit      [facility]  [phone]  Go to  As needed, If symptoms worsen      Future Appointments Scheduled at Time of Discharge:  Future Appointments   Date Time Provider Dax Spain   10/21/2020 10:00 AM [address] Course:   1) hyperosmolar nonketotic hyperglycemia  -resolved   -insulin gtt, transitioned to sc insulin 9/17/2020  -resume insulin pump  -ivf administered    -followed lytes/accuchecks    2) pseudohyponatremia  -resolved   -2/2 above     3) esrd   -stable   -renal following  -hd per renal   -avoid offending agents     -pt clinically back to baseline  -pt denies any complaints currently  -pt requesting discharge  -gi/renal state pt stable for discharge  -pt medically stable for discharge  -pt aware to f/u with gi, renal, and her pcp as an outpt  -pt aware to return to er immediately with any concerning signs or symptoms    Discharge Exam:   BP (!) 146/63   Pulse 74   Temp 98.5 °F (36.9 °C) (Temporal)   Resp 19   Ht 5' 6\" (1.676 m)   Wt 156 lb 1.6 oz (70.8 kg)   SpO2 98%   BMI 25.20 kg/m²     General: no acute distress  HEENT: normocephalic, atraumatic  Neck: supple, symmetrical, trachea midline   Lungs: clear to auscultation bilaterally   Cardiovascular: s1 and s2 normal  Abdomen: soft, positive bowel sounds, nondistended, nontender  Extremities: no edema or cyanosis   Neuro: aaox3, no focal deficits   Skin: normal color and texture    Recent Labs     09/16/20  1350 09/17/20  0504 09/18/20  0340   WBC 5.6 6.5 6.9   HGB 9.1* 9.0* 8.8*   PLT 95* 101* 97*     Recent Labs     09/16/20  2211 09/17/20  0504 09/18/20  0340   * 131* 134*   K 3.0* 4.8 4.2   CL 93* 94* 95*   CO2 28 28 30*   BUN 11 12 8   CREATININE 2.6* 2.9* 3.1*   GLUCOSE 60* 128* 182*     Recent Labs     09/16/20  1350 09/17/20  0504 09/18/20  0340   AST 20 16 20   ALT 21 16 15   BILITOT 0.5 0.3 <0.2   ALKPHOS 107* 89 90     Troponin T: No results for input(s): TROPONINI in the last 72 hours. Pro-BNP: No results for input(s): BNP in the last 72 hours. INR: No results for input(s): INR in the last 72 hours. UA:No results for input(s): NITRITE, COLORU, PHUR, LABCAST, WBCUA, RBCUA, MUCUS, TRICHOMONAS, YEAST, BACTERIA, CLARITYU, SPECGRAV, LEUKOCYTESUR, UROBILINOGEN, BILIRUBINUR, BLOODU, GLUCOSEU, AMORPHOUS in the last 72 hours. A1C:   Recent Labs     09/17/20  0504   LABA1C 11.8*     ABG:No results for input(s): PHART, RYP8ODZ, PO2ART, ENJ1IKB, BEART, HGBAE, Z6COMLMD, CARBOXHGBART in the last 72 hours. No results found. Condition on Discharge: completely resolved  Discharge Disposition: Home    Time Spent on Discharge: 40 minutes were spent in patient examination, evaluation, counseling, medication reconciliation, prescriptions for required medications, discharge plan, and follow up.     Rosalva Pettit MD  9/18/2020 7:50 AM

## 2020-09-18 NOTE — PROGRESS NOTES
Nephrology ([address] [facility]) Progress Note      Patient:  Cheyanne Vick  YOB: 1970  Date of Service: 9/18/2020  MRN: [id number]   Acct: [de-identified]   Primary Care Physician: ROMINA Chou  Advance Directive: Full Code  Admit Date: 9/16/2020       Hospital Day: 2  Referring Provider: Yuliet Melgoza MD    Patient independently seen and examined, Chart, Consults, Notes, Operative notes, Labs, Cardiology, and Radiology studies reviewed as available. Chief complaint: Severe hyponatremia/ESRD. Subjective:  Cheyanne Vick is a 45 y.o. female  whom we were consulted for end-stage renal disease. Patient goes to [facility] dialysis clinic on Tuesday Thursday Saturday. She was electively scheduled for colonoscopy and has drank 64 ounces of GoLYTELY which has very high blood sugar. Incidental finding on blood sugar fingersticks more than 1000 mg of blood sugar. She is currently admitted to ICU. Patient also has serum sodium of 120 mmol. She is currently receiving insulin drip for management of severe hyperglycemia. Patient has stable hemodynamics and feels very well very upset about high blood sugar. She has excellent glucose control and currently off insulin drip. This morning she feels well and hoping to go home in few hours. Allergies:  Penicillins;  Lamisil advanced [terbinafine]; Stadol [butorphanol]; and Reglan [metoclopramide]    Medicines:  Current Facility-Administered Medications   Medication Dose Route Frequency Provider Last Rate Last Dose    insulin glargine (LANTUS) injection vial 5 Units  5 Units Subcutaneous Nightly Kevin Baker, DO   5 Units at 09/17/20 1224    glucose (GLUTOSE) 40 % oral gel 15 g  15 g Oral PRN Kevin Chew Baker, DO        dextrose 50 % IV solution  12.5 g Intravenous PRN Kevin Baker, DO        glucagon (rDNA) injection 1 mg  1 mg Intramuscular PRN Kevin Chew Samuel, DO        dextrose 5 % solution  100 mL/hr Intravenous PRN [facility] Route, DO        insulin lispro (HUMALOG) injection vial 3 Units  3 Units Subcutaneous TID  Sandyjeffjosiane Lander Baker, DO        insulin lispro (HUMALOG) injection vial 0-6 Units  0-6 Units Subcutaneous TID  Sandyjeffjosiane Lander Baker, DO   4 Units at 09/18/20 1842    insulin lispro (HUMALOG) injection vial 0-3 Units  0-3 Units Subcutaneous Nightly Sandyjeffjosiane Baker, DO   2 Units at 09/17/20 2107    hydrALAZINE (APRESOLINE) injection 10 mg  10 mg Intravenous Q6H PRN Charlie Lander Baker, DO   10 mg at 09/18/20 0141    labetalol (NORMODYNE;TRANDATE) injection 20 mg  20 mg Intravenous Q4H PRN Shadi Lander Baker, DO   20 mg at 09/18/20 5310    dextrose 50 % IV solution  12.5 g Intravenous PRN [facility], DO   12.5 g at 09/17/20 0310    potassium chloride 10 mEq/100 mL IVPB (Peripheral Line)  10 mEq Intravenous PRN Claudell Route,  mL/hr at 09/17/20 0224 10 mEq at 09/17/20 0224    magnesium sulfate 1 g in dextrose 5% 100 mL IVPB  1 g Intravenous PRN Shadi Lander Baker, DO        sodium phosphate 10 mmol in dextrose 5 % 250 mL IVPB  10 mmol Intravenous PRN Claudell Route, DO        Or    sodium phosphate 15 mmol in dextrose 5 % 250 mL IVPB  15 mmol Intravenous PRN Claudell Route, DO        Or    sodium phosphate 20 mmol in dextrose 5 % 500 mL IVPB  20 mmol Intravenous PRN Claudell Route, DO   Stopped at 09/17/20 9004    dextrose 5 % and 0.45 % NaCl with KCl 20 mEq infusion   Intravenous Continuous PRN Claudell Route, DO        ipratropium-albuterol (DUONEB) nebulizer solution 1 ampule  1 ampule Inhalation Q4H PRN Claudell Route, DO        cloNIDine (CATAPRES) tablet 0.3 mg  0.3 mg Oral 3 times per day Claudell Route, DO   0.3 mg at 09/18/20 0511    DULoxetine (CYMBALTA) extended release capsule 60 mg  60 mg Oral Daily [facility] Lander Baker, DO   60 mg at 09/18/20 6299    hydrALAZINE (APRESOLINE) tablet 50 mg  50 mg Oral 3 times per day Charlie Baker DO   50 mg at 09/18/20 0842    isosorbide mononitrate (IMDUR) extended release tablet 120 mg  120 mg Oral Daily Femi Baker, DO   120 mg at 09/18/20 9394    levothyroxine (SYNTHROID) tablet 150 mcg  150 mcg Oral Daily Femi Baker, DO   150 mcg at 09/18/20 1244    lisinopril (PRINIVIL;ZESTRIL) tablet 20 mg  20 mg Oral BID Femi Baker, DO   20 mg at 09/18/20 8537    metoprolol succinate (TOPROL XL) extended release tablet 100 mg  100 mg Oral QAM AC Femi Baker, DO   100 mg at 09/18/20 2605    NIFEdipine (PROCARDIA XL) extended release tablet 60 mg  60 mg Oral Daily Femi Baker, DO   60 mg at 09/18/20 6267    ondansetron (ZOFRAN-ODT) disintegrating tablet 4 mg  4 mg Oral Q8H PRN López Bryanius, DO        rOPINIRole (REQUIP) tablet 4 mg  4 mg Oral Nightly Femi Baker, DO   4 mg at 09/17/20 2107    sevelamer (RENVELA) tablet 800 mg  800 mg Oral TID WC Femi Baker, DO   800 mg at 09/18/20 3635    atorvastatin (LIPITOR) tablet 20 mg  20 mg Oral Daily Femi Baker, DO   20 mg at 09/18/20 2145    hydroCHLOROthiazide (HYDRODIURIL) tablet 25 mg  25 mg Oral Daily Femi Baker, DO   25 mg at 09/18/20 9581    sodium chloride flush 0.9 % injection 10 mL  10 mL Intravenous 2 times per day López Bryanius, DO   10 mL at 09/18/20 1328    sodium chloride flush 0.9 % injection 10 mL  10 mL Intravenous PRN Femi Baker, DO        acetaminophen (TYLENOL) tablet 650 mg  650 mg Oral Q6H PRN López Sartorius, DO        Or    acetaminophen (TYLENOL) suppository 650 mg  650 mg Rectal Q6H PRN López Sartorius, DO        promethazine (PHENERGAN) tablet 12.5 mg  12.5 mg Oral Q6H PRN Femi Baker, DO        Or    [facility]ansetron [facility]) injection 4 mg  4 mg Intravenous Q6H PRN Femi Baker, DO   4 mg at 09/17/20 7401    niCARdipine (CARDENE) 25 mg in sodium chloride 0.9 % 250 mL infusion  5 mg/hr Intravenous PRN López Rowland DO   Stopped at 09/18/20 0100       Past Medical History:  Past Medical History:   Diagnosis Date    Arthritis     Chronic kidney disease, stage 5, [facility] failure [facility] [facility]     Closed fracture of right shoulder girdle, with routine healing, subsequent encounter     DM (diabetes mellitus) type II controlled with renal manifestation ([facility] [address] 06/1993    Gastroparesis     GERD (gastroesophageal reflux disease)     Hemodialysis patient ([facility] [address]     dialysis on tues, thur, and sat [facility]    Hypertension     Hypothyroid     Nasal congestion     recent    Noncompliance with medications     Palliative care patient 10/08/2019    Restless leg syndrome        Past Surgical History:  Past Surgical History:   Procedure Laterality Date    BREAST SURGERY      infected milk gland removed, 2004    CHOLECYSTECTOMY, LAPAROSCOPIC      2008    COLONOSCOPY Left 9/17/2020    Dr Nathalia Mendosa hepatic flexure-Severe tortuosity with severe spasming, 1 yr recall    DIALYSIS FISTULA CREATION Left 1/25/2019    REVISION LEFT UPPER EXTREMITY AV ACCESS WITH LEFT BRACHIAL ARTERY TO LEFT AXILLARY VEIN WITH  INTERPOSITIONAL ARTEGRAFT   performed by Paola Merritt MD at [address]  2012    [address] Right 1/25/2019    INSERTION OF RIGHT INTERNAL JUGULAR HEMODIALYSIS CATHETER performed by Paola Merritt MD at [address]  08/17/2018    1.  Moderately increased stainable iron identified by special stain in Kupffer cells and occasional hepatocytes. Negative for evidence of significant portal or lobular inflammation. Negative for evidence of significant fibrosis    ND COLONOSCOPY W/BIOPSY SINGLE/MULTIPLE N/A 10/20/2017    Dr Cassie Duarte prep-Tubular AP (-) dysplasia x 2--3 yr recall    ND EGD TRANSORAL BIOPSY SINGLE/MULTIPLE N/A 12/27/2016    Dr Dewey Wolfe EGD TRANSORAL BIOPSY SINGLE/MULTIPLE N/A 10/20/2017    Dr Particia Bui (-)   [facility] VASCULAR SURGERY Left 2016    fistula by Dr Danielle Dickerson at [address]  10/02/2017    [facility]. Left upper fistulograms/venograms, balloon angioplasty cephalic vein arch 10x80 conquest.    VASCULAR SURGERY  2018    FISTULOGRAM    VASCULAR SURGERY  10/29/2018    [facility]. Left upper fistulograms/venograms    VASCULAR SURGERY  2018    [facility]. Arch aortogram,left upper arteriograms.  VASCULAR SURGERY  2019    [facility]. Removal of tunneled dialyis catheter right internal jugular vein.  VASCULAR SURGERY  2019    [facility]. Left upper extremity fistulogram including venography to the superior vena cava. Balloon angioplasty left subclavian vein with 10mm x 40mm conquest balloon. Balloon angioplasty mid/proximal upper arm cephalic vein with 13DC x 40mm conquest balloon. Venograms after balloon angioplasty. Stent left mid/proximal upper arm cephalic vein stenosis fluency 10mm x 60mm self-expanding covered stent. Balloon     VASCULAR SURGERY  2019    cont angioplasty stent with 10mm x 40mm conquest balloon. Completion venograms left upper extremity.        Family History  Family History   Problem Relation Age of Onset    Colon Cancer Mother          at 63    Colon Polyps Mother    Sherly Suggs Father          at 66    Colon Polyps Father     Stomach Cancer Sister     Breast Cancer Sister     Depression Daughter 25        committed suicide    Liver Cancer Neg Hx     Liver Disease Neg Hx     Esophageal Cancer Neg Hx     Rectal Cancer Neg Hx        Social History  Social History     Socioeconomic History    Marital status: Single     Spouse name: Not on file    Number of children: 2    Years of education: Not on file    Highest education level: Not on file   Occupational History    Not on file   Social Needs    Financial resource strain: Not on file    Food insecurity     Worry: Not on file     Inability: Not on file    Transportation needs     Medical: Not on file     Non-medical: Not on file   Tobacco Use    Smoking status: Current Every Day Smoker     Packs/day: 2.00     Years: 33.00     Pack years: 66.00     Types: Cigarettes    Smokeless tobacco: Never Used    Tobacco comment: NOW at 1/4 PD, started at age 24 and has averaged 2 PPD   Substance and Sexual Activity    Alcohol use: No    Drug use: Not Currently     Types: Marijuana    Sexual activity: Not Currently     Partners: Male   Lifestyle    Physical activity     Days per week: Not on file     Minutes per session: Not on file    Stress: Not on file   Relationships    Social connections     Talks on phone: Not on file     Gets together: Not on file     Attends Religion service: Not on file     Active member of club or organization: Not on file     Attends meetings of clubs or organizations: Not on file     Relationship status: Not on file    Intimate partner violence     Fear of current or ex partner: Not on file     Emotionally abused: Not on file     Physically abused: Not on file     Forced sexual activity: Not on file   Other Topics Concern    Not on file   Social History Narrative    Not on file         Review of Systems:  History obtained from chart review and the patient  General ROS: No fever or chills  Respiratory ROS: No cough, shortness of breath, wheezing  Cardiovascular ROS: No chest pain or palpitations  Gastrointestinal ROS: No abdominal pain or melena  Genito-Urinary ROS: No dysuria or hematuria  Musculoskeletal ROS: No joint pain or swelling   14 point ROS reviewed with the patient and negative except as noted above and in the HPI unless unable to obtain.     Objective:  Patient Vitals for the past 24 hrs:   BP Temp Temp src Pulse Resp SpO2 Weight   09/18/20 0800 (!) 146/63 98.9 °F (37.2 °C) Temporal 75 17 99 % --   09/18/20 0700 (!) 148/63 -- -- 76 14 98 % --   09/18/20 0650 (!) 146/63 -- -- 74 -- -- --   09/18/20 0647 (!) 146/63 -- -- 74 19 98 % --   09/18/20 0631 (!) 146/60 -- -- 76 18 97 % --   09/18/20 0616 (!) 142/63 -- -- 74 19 97 % --   09/18/20 0601 (!) 143/60 -- -- 74 19 97 % --   09/18/20 0551 138/60 -- -- 74 20 98 % --   09/18/20 0534 (!) 173/62 -- -- 75 27 98 % -- 09/18/20 0517 (!) 175/76 -- -- 74 23 97 % --   09/18/20 0511 (!) 167/67 -- -- -- -- -- --   09/18/20 0501 (!) 167/67 -- -- 74 19 97 % 156 lb 1.6 oz (70.8 kg)   09/18/20 0447 (!) 175/68 -- -- 73 (!) 31 97 % --   09/18/20 0432 (!) 176/66 -- -- 73 22 98 % --   09/18/20 0417 (!) 169/69 -- -- 74 19 97 % --   09/18/20 0401 (!) 172/66 -- -- 72 18 97 % --   09/18/20 0400 -- -- -- 72 20 97 % --   09/18/20 0346 (!) 166/65 -- -- 73 17 96 % --   09/18/20 0335 (!) 170/68 -- -- 65 10 97 % --   09/18/20 0332 (!) 176/71 -- -- 65 20 97 % --   09/18/20 0317 (!) 194/71 -- -- 71 15 98 % --   09/18/20 0302 (!) 154/63 -- -- 65 17 96 % --   09/18/20 0246 (!) 161/60 -- -- 64 18 97 % --   09/18/20 0232 (!) 156/62 -- -- 64 17 97 % --   09/18/20 0217 (!) 158/61 -- -- 64 18 97 % --   09/18/20 0202 (!) 156/61 -- -- 62 17 98 % --   09/18/20 0200 -- -- -- 62 19 99 % --   09/18/20 0147 (!) 154/60 -- -- 60 16 98 % --   09/18/20 0141 (!) 164/55 -- -- -- -- -- --   09/18/20 0132 (!) 164/55 -- -- 59 10 99 % --   09/18/20 0117 (!) 146/60 -- -- 59 14 98 % --   09/18/20 0102 (!) 153/89 -- -- 63 (!) 31 100 % --   09/18/20 0100 -- -- -- 61 22 99 % --   09/18/20 0047 (!) 155/55 -- -- 59 17 98 % --   09/18/20 0032 (!) 164/51 -- -- 58 18 98 % --   09/18/20 0017 (!) 150/55 -- -- 59 18 97 % --   09/18/20 0002 (!) 146/56 98.5 °F (36.9 °C) Temporal 60 23 97 % --   09/18/20 0000 -- -- -- 59 -- -- --   09/17/20 2347 (!) 145/58 -- -- 62 20 97 % --   09/17/20 2332 (!) 158/55 -- -- 61 22 97 % --   09/17/20 2316 (!) 142/60 -- -- 62 (!) 49 99 % --   09/17/20 2301 (!) 146/58 -- -- 62 20 98 % --   09/17/20 2247 (!) 146/58 -- -- 62 18 98 % --   09/17/20 2232 (!) 152/52 -- -- 64 18 98 % --   09/17/20 2217 (!) 149/58 -- -- 63 24 98 % --   09/17/20 2202 (!) 143/56 -- -- 64 23 99 % --   09/17/20 2200 -- -- -- 63 15 98 % --   09/17/20 2146 (!) 164/59 -- -- 65 17 98 % --   09/17/20 2132 (!) 168/56 -- -- 65 10 94 % --   09/17/20 2116 (!) 174/61 -- -- 69 26 98 % --   09/17/20 2107 (!) 158/62 -- -- -- -- -- --   09/17/20 2047 (!) 157/58 -- -- 69 19 97 % --   09/17/20 2032 (!) 157/55 -- -- 63 16 97 % --   09/17/20 2018 (!) 150/58 -- -- 64 16 97 % --   09/17/20 2002 (!) 157/57 99.2 °F (37.3 °C) Temporal 66 18 97 % --   09/17/20 2000 -- -- -- 66 18 97 % --   09/17/20 1947 (!) 150/59 -- -- 66 20 97 % --   09/17/20 1932 (!) 151/57 -- -- 65 19 97 % --   09/17/20 1917 (!) 150/53 -- -- 66 18 96 % --   09/17/20 1900 (!) 150/57 -- -- 65 16 97 % --   09/17/20 1800 (!) 150/75 -- -- 62 15 97 % --   09/17/20 1700 (!) 144/62 -- -- 62 15 99 % --   09/17/20 1630 (!) 154/61 98.2 °F (36.8 °C) Temporal 61 21 94 % --   09/17/20 1504 (!) 156/62 -- -- 62 29 91 % --   09/17/20 1400 (!) 145/53 -- -- 63 17 95 % --   09/17/20 1302 (!) 148/59 -- -- 62 17 94 % --   09/17/20 1200 (!) 157/56 98.4 °F (36.9 °C) Temporal 62 19 97 % --   09/17/20 1116 (!) 151/59 -- -- 58 23 98 % --   09/17/20 1102 (!) 151/58 -- -- 59 18 96 % --   09/17/20 1032 (!) 149/62 -- -- 60 19 98 % --   09/17/20 1019 (!) 158/64 -- -- 59 19 98 % --   09/17/20 1006 (!) 174/68 -- -- 57 18 98 % --   09/17/20 1004 -- -- -- 57 22 97 % --   09/17/20 0910 (!) 158/62 -- -- 57 -- -- --       Intake/Output Summary (Last 24 hours) at 9/18/2020 0842  Last data filed at 9/18/2020 0800  Gross per 24 hour   Intake 1968.3 ml   Output --   Net 1968.3 ml     General: awake/alert   HEENT: Normocephalic atraumatic head  Neck: Supple with no JVD or carotid bruits.   Chest:  clear to auscultation bilaterally  CVS: regular rate and rhythm  Abdominal: soft, nontender, normal bowel sounds  Extremities: no cyanosis or edema  Skin: warm and dry without rash      Labs:  BMP:   Recent Labs     09/16/20  1637 09/16/20  2211 09/17/20  0504 09/18/20  0340   * 133* 131* 134*   K 2.8* 3.0* 4.8 4.2   CL 91* 93* 94* 95*   CO2 27 28 28 30*   PHOS 1.4* 1.3* 3.1  --    BUN 10 11 12 8   CREATININE 1.9* 2.6* 2.9* 3.1*   CALCIUM 8.2* 8.5* 8.1* 8.2*     CBC:   Recent Labs [facility]  3.   Plan was discussed with Dr. Lo Figueroa MD  09/18/20  8:42 AM

## 2020-10-21 ENCOUNTER — APPOINTMENT (OUTPATIENT)
Dept: CT IMAGING | Age: 50
DRG: 637 | End: 2020-10-21
Payer: MEDICARE

## 2020-10-21 ENCOUNTER — HOSPITAL ENCOUNTER (INPATIENT)
Age: 50
LOS: 5 days | Discharge: HOME | DRG: 637 | End: 2020-10-26
Attending: EMERGENCY MEDICINE | Admitting: INTERNAL MEDICINE
Payer: MEDICARE

## 2020-10-21 PROBLEM — E11.10 DKA, TYPE 2, NOT AT GOAL (HCC): Status: ACTIVE | Noted: 2020-10-21

## 2020-10-21 PROBLEM — I16.0 HYPERTENSIVE URGENCY: Status: ACTIVE | Noted: 2020-10-21

## 2020-10-21 PROBLEM — R41.82 ALTERED MENTAL STATE: Status: ACTIVE | Noted: 2020-10-21

## 2020-10-21 LAB
ACETONE BLOOD: ABNORMAL
ALBUMIN SERPL-MCNC: 4.2 G/DL (ref 3.5–5.2)
ALP BLD-CCNC: 119 U/L (ref 35–104)
ALT SERPL-CCNC: 14 U/L (ref 5–33)
AMPHETAMINE SCREEN, URINE: NEGATIVE
ANION GAP SERPL CALCULATED.3IONS-SCNC: 12 MMOL/L (ref 7–19)
ANION GAP SERPL CALCULATED.3IONS-SCNC: 13 MMOL/L (ref 7–19)
ANION GAP SERPL CALCULATED.3IONS-SCNC: 16 MMOL/L (ref 7–19)
AST SERPL-CCNC: 25 U/L (ref 5–32)
BACTERIA: ABNORMAL /HPF
BARBITURATE SCREEN URINE: NEGATIVE
BASE EXCESS VENOUS: 0 MMOL/L
BASOPHILS ABSOLUTE: 0.1 K/UL (ref 0–0.2)
BASOPHILS RELATIVE PERCENT: 2.2 % (ref 0–1)
BENZODIAZEPINE SCREEN, URINE: NEGATIVE
BILIRUB SERPL-MCNC: 0.3 MG/DL (ref 0.2–1.2)
BILIRUBIN URINE: NEGATIVE
BLOOD, URINE: NEGATIVE
BUN BLDV-MCNC: 24 MG/DL (ref 6–20)
BUN BLDV-MCNC: 27 MG/DL (ref 6–20)
BUN BLDV-MCNC: 28 MG/DL (ref 6–20)
CALCIUM SERPL-MCNC: 8.6 MG/DL (ref 8.6–10)
CALCIUM SERPL-MCNC: 8.7 MG/DL (ref 8.6–10)
CALCIUM SERPL-MCNC: 9 MG/DL (ref 8.6–10)
CANNABINOID SCREEN URINE: NEGATIVE
CARBOXYHEMOGLOBIN: 6 %
CHLORIDE BLD-SCNC: 81 MMOL/L (ref 98–111)
CHLORIDE BLD-SCNC: 91 MMOL/L (ref 98–111)
CHLORIDE BLD-SCNC: 92 MMOL/L (ref 98–111)
CHP ED QC CHECK: NORMAL
CLARITY: CLEAR
CO2: 24 MMOL/L (ref 22–29)
CO2: 25 MMOL/L (ref 22–29)
CO2: 26 MMOL/L (ref 22–29)
COCAINE METABOLITE SCREEN URINE: NEGATIVE
COLOR: YELLOW
CREAT SERPL-MCNC: 2.9 MG/DL (ref 0.5–0.9)
CREAT SERPL-MCNC: 3.3 MG/DL (ref 0.5–0.9)
CREAT SERPL-MCNC: 3.6 MG/DL (ref 0.5–0.9)
CRYSTALS, UA: ABNORMAL /HPF
EKG P AXIS: 80 DEGREES
EKG P-R INTERVAL: 176 MS
EKG Q-T INTERVAL: 424 MS
EKG QRS DURATION: 100 MS
EKG QTC CALCULATION (BAZETT): 448 MS
EKG T AXIS: 77 DEGREES
EOSINOPHILS ABSOLUTE: 0.2 K/UL (ref 0–0.6)
EOSINOPHILS RELATIVE PERCENT: 3.2 % (ref 0–5)
EPITHELIAL CELLS, UA: ABNORMAL /HPF
GFR AFRICAN AMERICAN: 16
GFR AFRICAN AMERICAN: 18
GFR AFRICAN AMERICAN: 21
GFR NON-AFRICAN AMERICAN: 13
GFR NON-AFRICAN AMERICAN: 15
GFR NON-AFRICAN AMERICAN: 17
GLUCOSE BLD-MCNC: 1010 MG/DL (ref 74–109)
GLUCOSE BLD-MCNC: 327 MG/DL (ref 70–99)
GLUCOSE BLD-MCNC: 469 MG/DL (ref 70–99)
GLUCOSE BLD-MCNC: 513 MG/DL (ref 70–99)
GLUCOSE BLD-MCNC: 657 MG/DL (ref 74–109)
GLUCOSE BLD-MCNC: 848 MG/DL (ref 74–109)
GLUCOSE BLD-MCNC: 982 MG/DL (ref 74–109)
GLUCOSE BLD-MCNC: >550 MG/DL (ref 70–99)
GLUCOSE BLD-MCNC: >550 MG/DL (ref 70–99)
GLUCOSE URINE: =>1000 MG/DL
HCO3 VENOUS: 26 MMOL/L (ref 23–29)
HCT VFR BLD CALC: 28.5 % (ref 37–47)
HEMOGLOBIN: 9.7 G/DL (ref 12–16)
IMMATURE GRANULOCYTES #: 0 K/UL
KETONES, URINE: ABNORMAL MG/DL
LACTIC ACID: 1.3 MMOL/L (ref 0.5–1.9)
LEUKOCYTE ESTERASE, URINE: NEGATIVE
LYMPHOCYTES ABSOLUTE: 1.1 K/UL (ref 1.1–4.5)
LYMPHOCYTES RELATIVE PERCENT: 19.6 % (ref 20–40)
Lab: NORMAL
MAGNESIUM: 2 MG/DL (ref 1.6–2.6)
MAGNESIUM: 2.1 MG/DL (ref 1.6–2.6)
MCH RBC QN AUTO: 32.4 PG (ref 27–31)
MCHC RBC AUTO-ENTMCNC: 34 G/DL (ref 33–37)
MCV RBC AUTO: 95.3 FL (ref 81–99)
MONOCYTES ABSOLUTE: 0.5 K/UL (ref 0–0.9)
MONOCYTES RELATIVE PERCENT: 9.3 % (ref 0–10)
NEUTROPHILS ABSOLUTE: 3.5 K/UL (ref 1.5–7.5)
NEUTROPHILS RELATIVE PERCENT: 65.3 % (ref 50–65)
NITRITE, URINE: NEGATIVE
O2 CONTENT, VEN: 12 ML/DL
O2 SAT, VEN: 88 %
OPIATE SCREEN URINE: NEGATIVE
OSMOLALITY: 325 MOSM/KG (ref 275–300)
PCO2, VEN: 50 MMHG (ref 40–50)
PDW BLD-RTO: 13.8 % (ref 11.5–14.5)
PERFORMED ON: ABNORMAL
PH UA: 6 (ref 5–8)
PH VENOUS: 7.33 (ref 7.35–7.45)
PHOSPHORUS: 1.5 MG/DL (ref 2.5–4.5)
PHOSPHORUS: 1.8 MG/DL (ref 2.5–4.5)
PLATELET # BLD: 170 K/UL (ref 130–400)
PMV BLD AUTO: 11.7 FL (ref 9.4–12.3)
PO2, VEN: 61 MMHG
POTASSIUM SERPL-SCNC: 3 MMOL/L (ref 3.5–5)
POTASSIUM SERPL-SCNC: 3 MMOL/L (ref 3.5–5)
POTASSIUM SERPL-SCNC: 4.2 MMOL/L (ref 3.5–5)
PROTEIN UA: 100 MG/DL
RBC # BLD: 2.99 M/UL (ref 4.2–5.4)
RBC UA: ABNORMAL /HPF (ref 0–2)
SODIUM BLD-SCNC: 121 MMOL/L (ref 136–145)
SODIUM BLD-SCNC: 129 MMOL/L (ref 136–145)
SODIUM BLD-SCNC: 130 MMOL/L (ref 136–145)
SPECIFIC GRAVITY UA: 1.02 (ref 1–1.03)
TOTAL PROTEIN: 7.1 G/DL (ref 6.6–8.7)
UROBILINOGEN, URINE: 0.2 E.U./DL
WBC # BLD: 5.4 K/UL (ref 4.8–10.8)
WBC UA: ABNORMAL /HPF (ref 0–5)

## 2020-10-21 PROCEDURE — 85025 COMPLETE CBC W/AUTO DIFF WBC: CPT

## 2020-10-21 PROCEDURE — 2700000000 HC OXYGEN THERAPY PER DAY

## 2020-10-21 PROCEDURE — 2500000003 HC RX 250 WO HCPCS: Performed by: HOSPITALIST

## 2020-10-21 PROCEDURE — 99285 EMERGENCY DEPT VISIT HI MDM: CPT

## 2020-10-21 PROCEDURE — 82009 KETONE BODYS QUAL: CPT

## 2020-10-21 PROCEDURE — 96375 TX/PRO/DX INJ NEW DRUG ADDON: CPT

## 2020-10-21 PROCEDURE — 6360000002 HC RX W HCPCS: Performed by: EMERGENCY MEDICINE

## 2020-10-21 PROCEDURE — 2500000003 HC RX 250 WO HCPCS: Performed by: EMERGENCY MEDICINE

## 2020-10-21 PROCEDURE — 6360000002 HC RX W HCPCS: Performed by: HOSPITALIST

## 2020-10-21 PROCEDURE — 96374 THER/PROPH/DIAG INJ IV PUSH: CPT

## 2020-10-21 PROCEDURE — 83605 ASSAY OF LACTIC ACID: CPT

## 2020-10-21 PROCEDURE — 51702 INSERT TEMP BLADDER CATH: CPT

## 2020-10-21 PROCEDURE — 83735 ASSAY OF MAGNESIUM: CPT

## 2020-10-21 PROCEDURE — 82803 BLOOD GASES ANY COMBINATION: CPT

## 2020-10-21 PROCEDURE — 2500000003 HC RX 250 WO HCPCS: Performed by: INTERNAL MEDICINE

## 2020-10-21 PROCEDURE — 2580000003 HC RX 258: Performed by: HOSPITALIST

## 2020-10-21 PROCEDURE — 95819 EEG AWAKE AND ASLEEP: CPT

## 2020-10-21 PROCEDURE — 87086 URINE CULTURE/COLONY COUNT: CPT

## 2020-10-21 PROCEDURE — 2500000003 HC RX 250 WO HCPCS

## 2020-10-21 PROCEDURE — 2000000000 HC ICU R&B

## 2020-10-21 PROCEDURE — 84100 ASSAY OF PHOSPHORUS: CPT

## 2020-10-21 PROCEDURE — 6370000000 HC RX 637 (ALT 250 FOR IP): Performed by: INTERNAL MEDICINE

## 2020-10-21 PROCEDURE — 6370000000 HC RX 637 (ALT 250 FOR IP): Performed by: EMERGENCY MEDICINE

## 2020-10-21 PROCEDURE — 96361 HYDRATE IV INFUSION ADD-ON: CPT

## 2020-10-21 PROCEDURE — 36415 COLL VENOUS BLD VENIPUNCTURE: CPT

## 2020-10-21 PROCEDURE — 82947 ASSAY GLUCOSE BLOOD QUANT: CPT

## 2020-10-21 PROCEDURE — 6370000000 HC RX 637 (ALT 250 FOR IP): Performed by: HOSPITALIST

## 2020-10-21 PROCEDURE — 99223 1ST HOSP IP/OBS HIGH 75: CPT | Performed by: PSYCHIATRY & NEUROLOGY

## 2020-10-21 PROCEDURE — 5A1D70Z PERFORMANCE OF URINARY FILTRATION, INTERMITTENT, LESS THAN 6 HOURS PER DAY: ICD-10-PCS | Performed by: INTERNAL MEDICINE

## 2020-10-21 PROCEDURE — 2580000003 HC RX 258: Performed by: INTERNAL MEDICINE

## 2020-10-21 PROCEDURE — 99999 PR OFFICE/OUTPT VISIT,PROCEDURE ONLY: CPT | Performed by: EMERGENCY MEDICINE

## 2020-10-21 PROCEDURE — 36600 WITHDRAWAL OF ARTERIAL BLOOD: CPT

## 2020-10-21 PROCEDURE — 95816 EEG AWAKE AND DROWSY: CPT | Performed by: PSYCHIATRY & NEUROLOGY

## 2020-10-21 PROCEDURE — 81001 URINALYSIS AUTO W/SCOPE: CPT

## 2020-10-21 PROCEDURE — 70450 CT HEAD/BRAIN W/O DYE: CPT

## 2020-10-21 PROCEDURE — 2580000003 HC RX 258: Performed by: EMERGENCY MEDICINE

## 2020-10-21 PROCEDURE — 83930 ASSAY OF BLOOD OSMOLALITY: CPT

## 2020-10-21 PROCEDURE — 80307 DRUG TEST PRSMV CHEM ANLYZR: CPT

## 2020-10-21 PROCEDURE — 80053 COMPREHEN METABOLIC PANEL: CPT

## 2020-10-21 PROCEDURE — 93005 ELECTROCARDIOGRAM TRACING: CPT | Performed by: EMERGENCY MEDICINE

## 2020-10-21 PROCEDURE — 6360000002 HC RX W HCPCS

## 2020-10-21 RX ORDER — INSULIN GLARGINE 100 [IU]/ML
15 INJECTION, SOLUTION SUBCUTANEOUS NIGHTLY
Status: DISCONTINUED | OUTPATIENT
Start: 2020-10-21 | End: 2020-10-21 | Stop reason: DRUGHIGH

## 2020-10-21 RX ORDER — POTASSIUM CHLORIDE 7.45 MG/ML
10 INJECTION INTRAVENOUS PRN
Status: DISCONTINUED | OUTPATIENT
Start: 2020-10-21 | End: 2020-10-26 | Stop reason: HOSPADM

## 2020-10-21 RX ORDER — CLONIDINE HYDROCHLORIDE 0.1 MG/1
0.3 TABLET ORAL EVERY 8 HOURS SCHEDULED
Status: DISCONTINUED | OUTPATIENT
Start: 2020-10-21 | End: 2020-10-21

## 2020-10-21 RX ORDER — METOPROLOL TARTRATE 5 MG/5ML
10 INJECTION INTRAVENOUS EVERY 6 HOURS
Status: DISCONTINUED | OUTPATIENT
Start: 2020-10-21 | End: 2020-10-22

## 2020-10-21 RX ORDER — HEPARIN SODIUM 5000 [USP'U]/ML
5000 INJECTION, SOLUTION INTRAVENOUS; SUBCUTANEOUS EVERY 8 HOURS
Status: DISCONTINUED | OUTPATIENT
Start: 2020-10-21 | End: 2020-10-26 | Stop reason: HOSPADM

## 2020-10-21 RX ORDER — DEXTROSE MONOHYDRATE 25 G/50ML
12.5 INJECTION, SOLUTION INTRAVENOUS PRN
Status: DISCONTINUED | OUTPATIENT
Start: 2020-10-21 | End: 2020-10-26 | Stop reason: HOSPADM

## 2020-10-21 RX ORDER — ATORVASTATIN CALCIUM 20 MG/1
20 TABLET, FILM COATED ORAL NIGHTLY
Status: DISCONTINUED | OUTPATIENT
Start: 2020-10-21 | End: 2020-10-21

## 2020-10-21 RX ORDER — LABETALOL HYDROCHLORIDE 5 MG/ML
20 INJECTION, SOLUTION INTRAVENOUS ONCE
Status: COMPLETED | OUTPATIENT
Start: 2020-10-21 | End: 2020-10-21

## 2020-10-21 RX ORDER — SEVELAMER CARBONATE 800 MG/1
800 TABLET, FILM COATED ORAL
Status: DISCONTINUED | OUTPATIENT
Start: 2020-10-21 | End: 2020-10-21

## 2020-10-21 RX ORDER — ALBUTEROL SULFATE 2.5 MG/3ML
2.5 SOLUTION RESPIRATORY (INHALATION) EVERY 4 HOURS PRN
Status: DISCONTINUED | OUTPATIENT
Start: 2020-10-21 | End: 2020-10-26 | Stop reason: HOSPADM

## 2020-10-21 RX ORDER — DEXMEDETOMIDINE HYDROCHLORIDE 4 UG/ML
0.2 INJECTION, SOLUTION INTRAVENOUS CONTINUOUS
Status: DISCONTINUED | OUTPATIENT
Start: 2020-10-21 | End: 2020-10-22

## 2020-10-21 RX ORDER — LORAZEPAM 2 MG/ML
INJECTION INTRAMUSCULAR
Status: COMPLETED
Start: 2020-10-21 | End: 2020-10-21

## 2020-10-21 RX ORDER — 0.9 % SODIUM CHLORIDE 0.9 %
1000 INTRAVENOUS SOLUTION INTRAVENOUS ONCE
Status: COMPLETED | OUTPATIENT
Start: 2020-10-21 | End: 2020-10-21

## 2020-10-21 RX ORDER — HYDRALAZINE HYDROCHLORIDE 50 MG/1
50 TABLET, FILM COATED ORAL EVERY 8 HOURS SCHEDULED
Status: DISCONTINUED | OUTPATIENT
Start: 2020-10-21 | End: 2020-10-21

## 2020-10-21 RX ORDER — SENNA PLUS 8.6 MG/1
1 TABLET ORAL DAILY
Status: DISCONTINUED | OUTPATIENT
Start: 2020-10-21 | End: 2020-10-21

## 2020-10-21 RX ORDER — HYDROCHLOROTHIAZIDE 25 MG/1
25 TABLET ORAL DAILY
Status: DISCONTINUED | OUTPATIENT
Start: 2020-10-21 | End: 2020-10-21

## 2020-10-21 RX ORDER — LEVETIRACETAM 5 MG/ML
500 INJECTION INTRAVASCULAR EVERY 24 HOURS
Status: DISCONTINUED | OUTPATIENT
Start: 2020-10-22 | End: 2020-10-23

## 2020-10-21 RX ORDER — INSULIN GLARGINE 100 [IU]/ML
15 INJECTION, SOLUTION SUBCUTANEOUS ONCE
Status: DISCONTINUED | OUTPATIENT
Start: 2020-10-21 | End: 2020-10-21 | Stop reason: DRUGHIGH

## 2020-10-21 RX ORDER — INSULIN GLARGINE 100 [IU]/ML
30 INJECTION, SOLUTION SUBCUTANEOUS 2 TIMES DAILY
Status: DISCONTINUED | OUTPATIENT
Start: 2020-10-21 | End: 2020-10-23

## 2020-10-21 RX ORDER — SODIUM CHLORIDE 450 MG/100ML
INJECTION, SOLUTION INTRAVENOUS CONTINUOUS
Status: DISCONTINUED | OUTPATIENT
Start: 2020-10-21 | End: 2020-10-21

## 2020-10-21 RX ORDER — DULOXETIN HYDROCHLORIDE 60 MG/1
60 CAPSULE, DELAYED RELEASE ORAL DAILY
Status: DISCONTINUED | OUTPATIENT
Start: 2020-10-21 | End: 2020-10-21

## 2020-10-21 RX ORDER — METOPROLOL TARTRATE 5 MG/5ML
INJECTION INTRAVENOUS
Status: COMPLETED
Start: 2020-10-21 | End: 2020-10-21

## 2020-10-21 RX ORDER — SODIUM CHLORIDE 9 MG/ML
INJECTION, SOLUTION INTRAVENOUS CONTINUOUS
Status: DISCONTINUED | OUTPATIENT
Start: 2020-10-21 | End: 2020-10-22

## 2020-10-21 RX ORDER — METOPROLOL SUCCINATE 50 MG/1
100 TABLET, EXTENDED RELEASE ORAL
Status: DISCONTINUED | OUTPATIENT
Start: 2020-10-21 | End: 2020-10-21

## 2020-10-21 RX ORDER — LEVOTHYROXINE SODIUM 0.07 MG/1
150 TABLET ORAL DAILY
Status: DISCONTINUED | OUTPATIENT
Start: 2020-10-21 | End: 2020-10-21

## 2020-10-21 RX ORDER — NIFEDIPINE 60 MG/1
60 TABLET, EXTENDED RELEASE ORAL DAILY
Status: DISCONTINUED | OUTPATIENT
Start: 2020-10-21 | End: 2020-10-21

## 2020-10-21 RX ORDER — LORAZEPAM 2 MG/ML
1 INJECTION INTRAMUSCULAR ONCE
Status: COMPLETED | OUTPATIENT
Start: 2020-10-21 | End: 2020-10-21

## 2020-10-21 RX ORDER — LORAZEPAM 2 MG/ML
INJECTION INTRAMUSCULAR
Status: DISCONTINUED
Start: 2020-10-21 | End: 2020-10-21 | Stop reason: WASHOUT

## 2020-10-21 RX ORDER — DEXTROSE, SODIUM CHLORIDE, AND POTASSIUM CHLORIDE 5; .45; .15 G/100ML; G/100ML; G/100ML
INJECTION INTRAVENOUS CONTINUOUS PRN
Status: DISCONTINUED | OUTPATIENT
Start: 2020-10-21 | End: 2020-10-21

## 2020-10-21 RX ORDER — MAGNESIUM SULFATE 1 G/100ML
1 INJECTION INTRAVENOUS PRN
Status: DISCONTINUED | OUTPATIENT
Start: 2020-10-21 | End: 2020-10-26 | Stop reason: HOSPADM

## 2020-10-21 RX ORDER — ISOSORBIDE MONONITRATE 60 MG/1
120 TABLET, EXTENDED RELEASE ORAL DAILY
Status: DISCONTINUED | OUTPATIENT
Start: 2020-10-21 | End: 2020-10-21

## 2020-10-21 RX ORDER — LEVETIRACETAM 10 MG/ML
1000 INJECTION INTRAVASCULAR ONCE
Status: COMPLETED | OUTPATIENT
Start: 2020-10-21 | End: 2020-10-21

## 2020-10-21 RX ORDER — ROPINIROLE 4 MG/1
4 TABLET, FILM COATED ORAL NIGHTLY
Status: DISCONTINUED | OUTPATIENT
Start: 2020-10-21 | End: 2020-10-21

## 2020-10-21 RX ORDER — LISINOPRIL 10 MG/1
20 TABLET ORAL 2 TIMES DAILY
Status: DISCONTINUED | OUTPATIENT
Start: 2020-10-21 | End: 2020-10-21

## 2020-10-21 RX ADMIN — DEXMEDETOMIDINE HYDROCHLORIDE 0.6 MCG/KG/HR: 4 INJECTION, SOLUTION INTRAVENOUS at 21:30

## 2020-10-21 RX ADMIN — METOPROLOL TARTRATE 10 MG: 1 INJECTION, SOLUTION INTRAVENOUS at 20:02

## 2020-10-21 RX ADMIN — SODIUM CHLORIDE 20 UNITS/HR: 9 INJECTION, SOLUTION INTRAVENOUS at 08:22

## 2020-10-21 RX ADMIN — INSULIN LISPRO 18 UNITS: 100 INJECTION, SOLUTION INTRAVENOUS; SUBCUTANEOUS at 12:20

## 2020-10-21 RX ADMIN — DEXMEDETOMIDINE HYDROCHLORIDE 0.4 MCG/KG/HR: 4 INJECTION, SOLUTION INTRAVENOUS at 14:31

## 2020-10-21 RX ADMIN — HEPARIN SODIUM 5000 UNITS: 5000 INJECTION INTRAVENOUS; SUBCUTANEOUS at 15:50

## 2020-10-21 RX ADMIN — Medication 30 UNITS: at 20:03

## 2020-10-21 RX ADMIN — HEPARIN SODIUM 5000 UNITS: 5000 INJECTION INTRAVENOUS; SUBCUTANEOUS at 08:40

## 2020-10-21 RX ADMIN — INSULIN LISPRO 6 UNITS: 100 INJECTION, SOLUTION INTRAVENOUS; SUBCUTANEOUS at 20:03

## 2020-10-21 RX ADMIN — Medication 20 MG: at 05:58

## 2020-10-21 RX ADMIN — SODIUM CHLORIDE 5 MG/HR: 9 INJECTION, SOLUTION INTRAVENOUS at 08:15

## 2020-10-21 RX ADMIN — SODIUM CHLORIDE 6 MG/HR: 9 INJECTION, SOLUTION INTRAVENOUS at 13:38

## 2020-10-21 RX ADMIN — INSULIN LISPRO 18 UNITS: 100 INJECTION, SOLUTION INTRAVENOUS; SUBCUTANEOUS at 17:12

## 2020-10-21 RX ADMIN — SODIUM CHLORIDE 8 MG/HR: 9 INJECTION, SOLUTION INTRAVENOUS at 20:15

## 2020-10-21 RX ADMIN — SODIUM CHLORIDE 1000 ML: 9 INJECTION, SOLUTION INTRAVENOUS at 04:05

## 2020-10-21 RX ADMIN — SODIUM PHOSPHATE, MONOBASIC, MONOHYDRATE 20 MMOL: 276; 142 INJECTION, SOLUTION INTRAVENOUS at 14:10

## 2020-10-21 RX ADMIN — POTASSIUM CHLORIDE 10 MEQ: 7.46 INJECTION, SOLUTION INTRAVENOUS at 12:21

## 2020-10-21 RX ADMIN — METOPROLOL TARTRATE 10 MG: 1 INJECTION, SOLUTION INTRAVENOUS at 08:00

## 2020-10-21 RX ADMIN — SODIUM CHLORIDE: 9 INJECTION, SOLUTION INTRAVENOUS at 08:20

## 2020-10-21 RX ADMIN — METOPROLOL TARTRATE 10 MG: 1 INJECTION, SOLUTION INTRAVENOUS at 12:20

## 2020-10-21 RX ADMIN — HEPARIN SODIUM 5000 UNITS: 5000 INJECTION INTRAVENOUS; SUBCUTANEOUS at 23:37

## 2020-10-21 RX ADMIN — LORAZEPAM 1 MG: 2 INJECTION INTRAMUSCULAR; INTRAVENOUS at 05:49

## 2020-10-21 RX ADMIN — SODIUM CHLORIDE 1000 ML: 9 INJECTION, SOLUTION INTRAVENOUS at 05:46

## 2020-10-21 RX ADMIN — LORAZEPAM 1 MG: 2 INJECTION INTRAMUSCULAR at 05:49

## 2020-10-21 RX ADMIN — LEVETIRACETAM 1000 MG: 10 INJECTION INTRAVENOUS at 06:09

## 2020-10-21 RX ADMIN — SODIUM CHLORIDE 0.1 UNITS/KG/HR: 9 INJECTION, SOLUTION INTRAVENOUS at 06:14

## 2020-10-21 RX ADMIN — Medication 30 UNITS: at 10:26

## 2020-10-21 RX ADMIN — INSULIN LISPRO 18 UNITS: 100 INJECTION, SOLUTION INTRAVENOUS; SUBCUTANEOUS at 09:01

## 2020-10-21 NOTE — H&P
Hospital Medicine H&P    Patient:  Ioana Payne  MRN: [id number]    Consulting Physician: Clint Iglesias DO  Reason for Consult: Medical Management  Primacy Care Physician: ROMINA Schultz    HISTORY OF PRESENT ILLNESS:   The patient is a 45 y.o. female presents to the ER with altered mental status. Her blood sugar was shown to be over thousand. She is in end-stage renal disease patient and receives maintenance dialysis. While in the emergency department, she had 2 seizures. Her blood pressure is 800 systolic on my assessment in the ICU. We will start her on a Cardene drip and ask nephrology and neurology to see her.     Past Medical History:        Diagnosis Date    Arthritis     Chronic kidney disease, stage 5, kidney failure (HCC)     Closed fracture of right shoulder girdle, with routine healing, subsequent encounter     DM (diabetes mellitus) type II controlled with renal manifestation ([facility] [address] 06/1993    Gastroparesis     GERD (gastroesophageal reflux disease)     Hemodialysis patient ([facility] [address]     dialysis on tues, thur, and sat at [facility]    Hypertension     Hypothyroid     Nasal congestion     recent    Noncompliance with medications     Palliative care patient 10/08/2019    Restless leg syndrome        Past Surgical History:        Procedure Laterality Date    BREAST SURGERY      infected milk gland removed, 2004    CHOLECYSTECTOMY, LAPAROSCOPIC      2008    COLONOSCOPY Left 9/17/2020    Dr Derick Leija hepatic flexure-Severe tortuosity with severe spasming, 1 yr recall    DIALYSIS FISTULA CREATION Left 1/25/2019    REVISION LEFT UPPER EXTREMITY AV ACCESS WITH LEFT BRACHIAL ARTERY TO LEFT AXILLARY VEIN WITH  INTERPOSITIONAL ARTEGRAFT   performed by Judit Tinoco MD at [address]  2012    [address] Right 1/25/2019    INSERTION OF RIGHT INTERNAL JUGULAR HEMODIALYSIS CATHETER performed by Judit Tinoco MD at [address] [facility] 08/17/2018    1.  Moderately increased stainable iron identified by special stain in Kupffer cells and occasional hepatocytes. Negative for evidence of significant portal or lobular inflammation. Negative for evidence of significant fibrosis    HI COLONOSCOPY W/BIOPSY SINGLE/MULTIPLE N/A 10/20/2017    Dr Radha Collazo prep-Tubular AP (-) dysplasia x 2--3 yr recall    HI EGD TRANSORAL BIOPSY SINGLE/MULTIPLE N/A 12/27/2016    Dr Bia Pitts EGD TRANSORAL BIOPSY SINGLE/MULTIPLE N/A 10/20/2017    Dr Lizy Prieto (-)   [address] [facility] VASCULAR SURGERY Left 2016    fistula by Dr Dick Bacon at [address]  10/02/2017    SJS. Left upper fistulograms/venograms, balloon angioplasty cephalic vein arch 42P55 conquest.    VASCULAR SURGERY  08/2018    FISTULOGRAM    VASCULAR SURGERY  10/29/2018    SJS. Left upper fistulograms/venograms    VASCULAR SURGERY  12/19/2018    SJS. Arch aortogram,left upper arteriograms.  VASCULAR SURGERY  03/06/2019    SJS. Removal of tunneled dialyis catheter right internal jugular vein.  VASCULAR SURGERY  08/28/2019    SJS. Left upper extremity fistulogram including venography to the superior vena cava. Balloon angioplasty left subclavian vein with 10mm x 40mm conquest balloon. Balloon angioplasty mid/proximal upper arm cephalic vein with 27UF x 40mm conquest balloon. Venograms after balloon angioplasty. Stent left mid/proximal upper arm cephalic vein stenosis fluency 10mm x 60mm self-expanding covered stent. Balloon     VASCULAR SURGERY  08/28/2019    cont angioplasty stent with 10mm x 40mm conquest balloon. Completion venograms left upper extremity.        Medications: Scheduled Meds:   hydroCHLOROthiazide  25 mg Oral Daily    hydrALAZINE  50 mg Oral 3 times per day    DULoxetine  60 mg Oral Daily    cloNIDine  0.3 mg Oral 3 times per day    isosorbide mononitrate  120 mg Oral Daily    levothyroxine  150 mcg Oral Daily    linaclotide  290 mcg Oral QAM AC   

## 2020-10-21 NOTE — PROCEDURES
Patient:   Tyler Almonte  MR#:    [id number]   Room:    1968/745-76   YOB: 1970  Date of Progress Note: 10/21/2020  Time of Note                           12:41 PM  Consulting Physician:   Nathaly Mclaughlin M.D. Attending Physician:  Twin Queen DO       This is a multichannel digital EEG recording using the international 10-20 placement system. Technical Summary:     Background EEG activity: The occipital dominant rhythm is 6-7 Hz. There is much low voltage 5-7 Hz activity seen in a generalized distribution intermixed with low voltage 18-22 Hz activity. There is low to moderate voltage 1.5-3 Hz activity seen intermittently. Photic Stimulation: No abnormal waveforms are noted with various frequencies of flickering light. IMPRESSION:   This is an abnormal EEG due to the mild slowing of the background. This finding is consistent with a mild diffuse disturbance in cerebral function. No clear epileptiform activity was noted during the recording period.        Dr Dayanna Sharpe Certified in Neurology  Board Certified in Clinical Neurophysiology  Fellowship trained in [address] [place] [address] Time 20 minutes

## 2020-10-21 NOTE — ED PROVIDER NOTES
[facility] EMERGENCY DEPT  eMERGENCY dEPARTMENT eNCOUnter      Pt Name: Kevyn Mathur  MRN: [id number]  [place] 1970  Date of evaluation: 10/21/2020  Provider: Tea Mendez MD    [address] [place] [address]       Chief Complaint   Patient presents with    Altered Mental Status         HISTORY OF PRESENT ILLNESS   (Location/Symptom, Timing/Onset,Context/Setting, Quality, Duration, Modifying Factors, Severity)  Note limiting factors. Kevyn Mathur is a 45 y.o. female who presents to the emergency department for evaluation of altered mental status. Patient is arrived here to the ED via EMS. EMS reported upon their arrival patient was confused and not really able to provide much history. In review of her previous records it appears that she just recently had an inpatient hospitalization a little over 1 month ago for hyperosmolar nonketotic hyperglycemia. She has a known previous history of diabetes. She is not really able to answer any questions at this time upon arrival to the ED and so therefore overall history is very limited. HPI    NursingNotes were reviewed.     REVIEW OF SYSTEMS    (2-9 systems for level 4, 10 or more for level 5)     Review of Systems   Unable to perform ROS: Mental status change            PAST MEDICALHISTORY     Past Medical History:   Diagnosis Date    Arthritis     Chronic kidney disease, stage 5, kidney failure (HCC)     Closed fracture of right shoulder girdle, with routine healing, subsequent encounter     DM (diabetes mellitus) type II controlled with renal manifestation ([facility] [address] 06/1993    Gastroparesis     GERD (gastroesophageal reflux disease)     Hemodialysis patient ([facility] [address]     dialysis on tues, thur, and sat at [facility] clinic    Hypertension     Hypothyroid     Nasal congestion     recent    Noncompliance with medications     Palliative care patient 10/08/2019    Restless leg syndrome          SURGICAL HISTORY       Past Surgical History:   Procedure Laterality Date    BREAST SURGERY      infected milk gland removed, 2004    CHOLECYSTECTOMY, LAPAROSCOPIC      2008    COLONOSCOPY Left 9/17/2020    Dr Alvarez No hepatic flexure-Severe tortuosity with severe spasming, 1 yr recall    DIALYSIS FISTULA CREATION Left 1/25/2019    REVISION LEFT UPPER EXTREMITY AV ACCESS WITH LEFT BRACHIAL ARTERY TO LEFT AXILLARY VEIN WITH  INTERPOSITIONAL ARTEGRAFT   performed by Kyle Barrientos MD at [address]  2012    [address] Right 1/25/2019    INSERTION OF RIGHT INTERNAL JUGULAR HEMODIALYSIS CATHETER performed by Kyle Barrientos MD at [facility]  08/17/2018    1.  Moderately increased stainable iron identified by special stain in Kupffer cells and occasional hepatocytes. Negative for evidence of significant portal or lobular inflammation. Negative for evidence of significant fibrosis    [place] COLONOSCOPY W/BIOPSY SINGLE/MULTIPLE N/A 10/20/2017    Dr [place] Double prep-Tubular AP (-) dysplasia x 2--3 yr recall    [place] EGD TRANSORAL BIOPSY SINGLE/MULTIPLE N/A 12/27/2016    Dr Ruthann Marie EGD TRANSORAL BIOPSY SINGLE/MULTIPLE N/A 10/20/2017    Dr Sosa Vaca (-)   [facility] VASCULAR SURGERY Left 2016    fistula by Dr Merlos Reasons at [address]  10/02/2017    SJS. Left upper fistulograms/venograms, balloon angioplasty cephalic vein arch 20E47 conquest.    VASCULAR SURGERY  08/2018    FISTULOGRAM    VASCULAR SURGERY  10/29/2018    SJS. Left upper fistulograms/venograms    VASCULAR SURGERY  12/19/2018    SJS. Arch aortogram,left upper arteriograms.  VASCULAR SURGERY  03/06/2019    SJS. Removal of tunneled dialyis catheter right internal jugular vein.  VASCULAR SURGERY  08/28/2019    SJS. Left upper extremity fistulogram including venography to the superior vena cava. Balloon angioplasty left subclavian vein with 10mm x 40mm conquest balloon. Balloon angioplasty mid/proximal upper arm cephalic vein with 42WT x 40mm conquest balloon. Venograms after balloon angioplasty. Stent left mid/proximal upper arm cephalic vein stenosis fluency 10mm x 60mm self-expanding covered stent. Balloon     VASCULAR SURGERY  08/28/2019    cont angioplasty stent with 10mm x 40mm conquest balloon. Completion venograms left upper extremity. CURRENT MEDICATIONS     Previous Medications    ALBUTEROL SULFATE  (90 BASE) MCG/ACT INHALER    Inhale 2 puffs into the lungs every 4 hours as needed    CLONIDINE (CATAPRES) 0.3 MG TABLET    Take 1 tablet by mouth every 8 hours    DULOXETINE (CYMBALTA) 60 MG EXTENDED RELEASE CAPSULE    Take 60 mg by mouth daily    ERGOCALCIFEROL (VITAMIN D2) 10 MCG (400 UNIT) TABS    Take 1.25 mg by mouth every 14 days    HYDRALAZINE (APRESOLINE) 50 MG TABLET    Take 1 tablet by mouth every 8 hours    HYDROCHLOROTHIAZIDE (HYDRODIURIL) 25 MG TABLET    Take 1 tablet by mouth daily    INSULIN ASPART (NOVOLOG FLEXPEN) 100 UNIT/ML INJECTION PEN    Inject 5 Units into the skin 3 times daily     INSULIN DETEMIR (LEVEMIR) 100 UNIT/ML INJECTION VIAL    Indications: Diabetes Through insulin pump.     ISOSORBIDE MONONITRATE (IMDUR) 120 MG EXTENDED RELEASE TABLET    Take 1 tablet by mouth daily    LEVOTHYROXINE (SYNTHROID) 150 MCG TABLET    Take 150 mcg by mouth Daily    LINACLOTIDE (LINZESS) 290 MCG CAPS CAPSULE    Take 1 capsule by mouth every morning (before breakfast)    LISINOPRIL (PRINIVIL;ZESTRIL) 20 MG TABLET    Take 1 tablet by mouth 2 times daily    METOPROLOL SUCCINATE (TOPROL XL) 100 MG EXTENDED RELEASE TABLET    Take 1 tablet by mouth every morning (before breakfast)    NIFEDIPINE (ADALAT CC) 60 MG EXTENDED RELEASE TABLET    Take 60 mg by mouth daily    ONDANSETRON (ZOFRAN-ODT) 4 MG DISINTEGRATING TABLET    Take 4 mg by mouth every 8 hours as needed for Nausea or Vomiting    ROPINIROLE (REQUIP) 2 MG TABLET    Take 4 mg by mouth nightly Indications: Restless Leg Syndrome     SENNA (SENNA-LAX) 8.6 MG TABLET    Take 1 tablet by None     Emotionally abused: None     Physically abused: None     Forced sexual activity: None   Other Topics Concern    None   Social History Narrative    None       SCREENINGS    [place] Coma Scale  Eye Opening: To speech  Best Verbal Response: None  Best Motor Response: Localizes pain  [place] Coma Scale Score: 9        PHYSICAL EXAM    (up to 7 for level 4, 8 or more for level 5)     ED Triage Vitals [10/21/20 0326]   BP Temp Temp src Pulse Resp SpO2 Height Weight   (!) 204/80 98 °F (36.7 °C) -- 76 18 94 % 5' 7\" (1.702 m) 170 lb (77.1 kg)       Physical Exam  Vitals signs and nursing note reviewed. HENT:      Head: Atraumatic. Mouth/Throat:      Mouth: Mucous membranes are dry. Pharynx: No posterior oropharyngeal erythema. Eyes:      General: No scleral icterus. Pupils: Pupils are equal, round, and reactive to light. Neck:      Trachea: No tracheal deviation. Cardiovascular:      Rate and Rhythm: Normal rate and regular rhythm. Heart sounds: Normal heart sounds. No murmur. Pulmonary:      Effort: Pulmonary effort is normal. No respiratory distress. Breath sounds: Normal breath sounds. No stridor. Abdominal:      General: There is no distension. Palpations: Abdomen is soft. Tenderness: There is no abdominal tenderness. There is no guarding. Musculoskeletal:      Right lower leg: No edema. Left lower leg: No edema. Skin:     Capillary Refill: Capillary refill takes more than 3 seconds. Coloration: Skin is not pale. Findings: No rash. Neurological:      Mental Status: She is lethargic. Comments: Examination is very limited. Patient does withdraw to noxious stimuli. She is not answering questions or following commands at this time.    Psychiatric:      Comments: Unable to assess         DIAGNOSTIC RESULTS     EKG: All EKG's areinterpreted by the Emergency Department Physician who either signs or Co-signs this chart in the absence of a cardiologist.    0372: NSR @ 74, no ST elevation    RADIOLOGY:  Non-plain film images such as CT, Ultrasound and MRI are read by the radiologist. Plain radiographic images are visualized and preliminarily interpreted bythe emergency physician with the below findings:    CT HEAD:    Comparison: 10./8/2019. No ICH, mass effect or edema. No evidence of acute cortical stroke. Visualized sinuses and mastoid air cells are clear.         CT HEAD WO CONTRAST    (Results Pending)           LABS:  Labs Reviewed   COMPREHENSIVE METABOLIC PANEL - Abnormal; Notable for the following components:       Result Value    Sodium 121 (*)     Chloride 81 (*)     Glucose 1,010 (*)     BUN 24 (*)     CREATININE 2.9 (*)     GFR Non- 17 (*)     GFR African American 21 (*)     Alkaline Phosphatase 119 (*)     All other components within normal limits    Narrative:     CALL  [place]  [facility] tel. ,  Chemistry results called to and read back by Alex Reeder RN in ED, 10/21/2020  04:15, by [facility]   CBC WITH AUTO DIFFERENTIAL - Abnormal; Notable for the following components:    RBC 2.99 (*)     Hemoglobin 9.7 (*)     Hematocrit 28.5 (*)     MCH 32.4 (*)     Neutrophils % 65.3 (*)     Lymphocytes % 19.6 (*)     Basophils % 2.2 (*)     All other components within normal limits   URINE RT REFLEX TO CULTURE - Abnormal; Notable for the following components:    Ketones, Urine TRACE (*)     Protein,  (*)     All other components within normal limits   ACETONE - Abnormal; Notable for the following components:    Acetone, Bld Small (*)     All other components within normal limits   VENOUS BLD GAS - Abnormal; Notable for the following components:    pH, Manish 7.33 (*)     All other components within normal limits   MICROSCOPIC URINALYSIS - Abnormal; Notable for the following components:    RBC, UA 3-5 (*)     Bacteria, UA 1+ (*)     Crystals, UA NEG (*)     All other components within normal limits   POCT GLUCOSE - Normal   CULTURE, URINE URINE DRUG SCREEN   LACTIC ACID, PLASMA   URINE DRUG SCREEN   OSMOLALITY, SERUM       All other labs were within normal range or not returned as of this dictation. EMERGENCY DEPARTMENT COURSE and DIFFERENTIAL DIAGNOSIS/MDM:   Vitals:    Vitals:    10/21/20 0405 10/21/20 0411 10/21/20 0418 10/21/20 0515   BP: (!) 190/90 (!) 225/79 (!) 213/75 (!) 230/108   Pulse: 71 77 70 75   Resp: 16 20 30 20   Temp:       SpO2: 92% 100% 100% 92%   Weight:       Height:           MDM    Reassessment    Patient serum glucose is greater than 1000. She is altered and appears dehydrated. Suspect that she is again presenting with hyperosmolar hyperglycemic state. I have started her on fluid resuscitation with normal saline. Serum pH is 7.33. Patient did have 2 episodes of tonic-clonic seizure here in the ED. The first event lasted approximately 20 seconds and resolved without intervention. Difficult to tell if she ever actually returned to baseline due to her altered mental status upon arrival.  She then had another event that lasted approximately 1 minute and resolved after administration of Ativan 1 mg IV. I went ahead and gave her Keppra 1 g at this time. CT brain does not reveal any acute pathology. Patient has completed 1 L of IV fluid infusion. We will plan to start a second liter and start her on insulin infusion. She will require inpatient admission to the intensive care unit. CONSULTS:    Case was discussed with Dr. Mac Aguero regarding inpatient admission to the hospitalist service to the intensive care unit. PROCEDURES:  Unless otherwise noted below, none     Procedures     CRITICAL CARE TIME   Total Critical Care time was 44 minutes, excluding separately reportable procedures. There was a high probability of clinically significant/life threatening deterioration in the patient's condition which required my urgent intervention.   Patient arrived to the ED with altered mental status and unstable vital signs.  Multiple reexaminations were made throughout her ED course of care. Time was spent reviewing laboratory studies, reviewing previous inpatient admission. Additional time spent directing ED care and discussion with the ED nursing staff regarding fluid resuscitation, insulin infusion and blood pressure management. FINAL IMPRESSION      1. Hyperosmolar hyperglycemic state (HHS) ([facility] [address]    2. Acute encephalopathy    3. Seizure ([facility] [address]    4.  Chronic kidney disease, unspecified CKD stage          DISPOSITION/PLAN   DISPOSITION Decision To Admit 10/21/2020 05:50:31 AM    (Please note that portions of this note were completed with a voice recognition program.  Efforts were made to edit thedictations but occasionally words are mis-transcribed.)    Anjana Clemons MD (electronically signed)  Attending Emergency Physician         Anjana Clemons MD  10/21/20 [id number]       Anjana Clemons MD  10/21/20 2847

## 2020-10-21 NOTE — CONSULTS
[facility] Neurology Consult        Patient:   Gertrude Emmanuel  MR#:    [id number]  Account Number:                   [id number]      Room:    [address] [place] [address]   YOB: 1970  Date of Progress Note: 10/21/2020  Time of Note                           11:14 AM  Attending Physician:  Ramses Robles DO  Consulting Physician:   Sara Busch M.D.        [address]:  Seizure x 2 / altered mental status       HISTORY OF PRESENT ILLNESS:   This 45 y.o. female with a past medical history significant for uncontrolled diabetes, HTN and ESRD on hemodialysis. The history is obtained from the chart and nursing as the patient is unable to provide a history due to her mental status. The patient was admitted to the hospital with altered mental status with seizure x 2, HTN urgency with blood pressure up to 020 systolic and severely elevated blood sugar> 1000. She has had pervious admissions to the hospital with hyperglycemia and HTN urgency with no significant change in her mental status. On the morning of admission the family went to wake her and they were unable to do so. In the Er she was confused and witnessed to have 2 brief seizures and given ativan and Keppra and transferred to ICU where she continued to be stuporous. No recent illnesses as per nursing. Afebrile with a normal white count of 5.4.     REVIEW OF SYSTEMS:  Unable to obtain due to mental status      Past Medical History:      Diagnosis Date    Arthritis     Chronic kidney disease, stage 5, kidney failure [address]     Closed fracture of right shoulder girdle, with routine healing, subsequent encounter     DM (diabetes mellitus) type II controlled with renal manifestation ([address] 06/1993    Gastroparesis     GERD (gastroesophageal reflux disease)     Hemodialysis patient ([address]     dialysis on tues, thur, and sat at [facility] clinic    Hypertension     Hypothyroid     Nasal congestion     recent    Noncompliance with medications     Palliative care patient 10/08/2019    Restless leg syndrome        Past Surgical History:      Procedure Laterality Date    BREAST SURGERY      infected milk gland removed, 2004    CHOLECYSTECTOMY, LAPAROSCOPIC      2008    COLONOSCOPY Left 9/17/2020    Dr Dany Tirado hepatic flexure-Severe tortuosity with severe spasming, 1 yr recall    DIALYSIS FISTULA CREATION Left 1/25/2019    REVISION LEFT UPPER EXTREMITY AV ACCESS WITH LEFT BRACHIAL ARTERY TO LEFT AXILLARY VEIN WITH  INTERPOSITIONAL ARTEGRAFT   performed by Ani Griffith MD at [address]  2012    [address] Right 1/25/2019    INSERTION OF RIGHT INTERNAL JUGULAR HEMODIALYSIS CATHETER performed by Ani Griffith MD at [address] [facility]  08/17/2018    1.  Moderately increased stainable iron identified by special stain in Kupffer cells and occasional hepatocytes. Negative for evidence of significant portal or lobular inflammation. Negative for evidence of significant fibrosis    [place] COLONOSCOPY W/BIOPSY SINGLE/MULTIPLE N/A 10/20/2017    Dr Payal Lemos prep-Tubular AP (-) dysplasia x 2--3 yr recall    [place] EGD TRANSORAL BIOPSY SINGLE/MULTIPLE N/A 12/27/2016    Dr Carlson Quiet EGD TRANSORAL BIOPSY SINGLE/MULTIPLE N/A 10/20/2017    Dr Gamaliel White (-)   [address] [facility] VASCULAR SURGERY Left 2016    fistula by Dr Maddison Rolon at [address]  10/02/2017    [facility]. Left upper fistulograms/venograms, balloon angioplasty cephalic vein arch [id number] conquest.    VASCULAR SURGERY  08/2018    FISTULOGRAM    VASCULAR SURGERY  10/29/2018    [facility]. Left upper fistulograms/venograms    VASCULAR SURGERY  12/19/2018    [facility]. Arch aortogram,left upper arteriograms.  VASCULAR SURGERY  03/06/2019    [facility]. Removal of tunneled dialyis catheter right internal jugular vein.  VASCULAR SURGERY  08/28/2019    [facility]. Left upper extremity fistulogram including venography to the superior vena cava. Balloon angioplasty left subclavian vein with 10mm x 40mm conquest balloon. Balloon angioplasty mid/proximal upper arm cephalic vein with 79RG x 40mm conquest balloon. Venograms after balloon angioplasty. Stent left mid/proximal upper arm cephalic vein stenosis fluency 10mm x 60mm self-expanding covered stent. Balloon     VASCULAR SURGERY  08/28/2019    cont angioplasty stent with 10mm x 40mm conquest balloon. Completion venograms left upper extremity.        Medications in Hospital:      Current Facility-Administered Medications:     albuterol (PROVENTIL) nebulizer solution 2.5 mg, 2.5 mg, Nebulization, Q4H PRN, Beena Dc MD    dextrose 50 % IV solution, 12.5 g, Intravenous, PRN, Beena Dc MD    potassium chloride 10 mEq/100 mL IVPB (Peripheral Line), 10 mEq, Intravenous, PRN, Beena Dc MD    magnesium sulfate 1 g in dextrose 5% 100 mL IVPB, 1 g, Intravenous, PRN, Beena Dc MD    sodium phosphate 10 mmol in dextrose 5 % 250 mL IVPB, 10 mmol, Intravenous, PRN **OR** sodium phosphate 15 mmol in dextrose 5 % 250 mL IVPB, 15 mmol, Intravenous, PRN **OR** sodium phosphate 20 mmol in dextrose 5 % 500 mL IVPB, 20 mmol, Intravenous, PRN, Beena Dc MD    heparin (porcine) injection 5,000 Units, 5,000 Units, Subcutaneous, Q8H, Beena Dc MD, 5,000 Units at 10/21/20 0840    niCARdipine (CARDENE) 50 mg in sodium chloride 0.9 % 250 mL infusion, 5 mg/hr, Intravenous, Continuous, Jani Marking, DO, Last Rate: 50 mL/hr at 10/21/20 0838, 10 mg/hr at 10/21/20 0838    metoprolol (LOPRESSOR) injection 10 mg, 10 mg, Intravenous, Q6H, Jani Marking, DO, 10 mg at 10/21/20 0800    0.9 % sodium chloride infusion, , Intravenous, Continuous, Jani Marking, DO, Last Rate: 15 mL/hr at 10/21/20 0820    [START ON 10/22/2020] levetiracetam (KEPPRA) 500 mg/100 mL IVPB, 500 mg, Intravenous, Q24H, Jani Marking, DO    insulin glargine (LANTUS) injection vial 30 Units, 30 Units, Subcutaneous, BID, Jani Marking, DO, 30 Units at 10/21/20 1026    insulin lispro (HUMALOG) injection vial 0-18 Units, 0-18 Units, Subcutaneous, TID WC, Graeme Omer DO, 18 Units at 10/21/20 0901    insulin lispro (HUMALOG) injection vial 0-9 Units, 0-9 Units, Subcutaneous, Nightly, Graeme Omer DO    Allergies:  Penicillins; Lamisil advanced [terbinafine]; Stadol [butorphanol]; and Reglan [metoclopramide]    Social History:   TOBACCO:   reports that she has been smoking cigarettes. She has a 66.00 pack-year smoking history. She has never used smokeless tobacco.  ETOH:   reports no history of alcohol use. Family History:       Problem Relation Age of Onset    Colon Cancer Mother          at 63    Colon Polyps Mother     Lung Cancer Father          at 66    Colon Polyps Father     Stomach Cancer Sister     Breast Cancer Sister     Depression Daughter 25        committed suicide    Liver Cancer Neg Hx     Liver Disease Neg Hx     Esophageal Cancer Neg Hx     Rectal Cancer Neg Hx            Physical Exam:    Vitals: BP (!) 148/94   Pulse 78   Temp 98.3 °F (36.8 °C) (Temporal)   Resp 23   Ht 5' 7\" (1.702 m)   Wt 170 lb (77.1 kg)   SpO2 94%   BMI 26.63 kg/m²     Constitutional - well developed, well nourished. Eyes - conjunctiva normal.  Pupils 3 mm rxt  Ear, nose, throat -hearing unable to test No scars, masses, or lesions over external nose or ears, no atrophy of tongue  Neck-symmetric, no masses noted, no jugular vein distension  Respiration- chest wall appears symmetric, good expansion,   normal effort without use of accessory muscles  Musculoskeletal - no significant wasting of muscles noted, no bony deformities  Extremities-no clubbing, cyanosis or edema  Skin - warm, dry, and intact. No rash, erythema, or pallor.   Psychiatric - mood, affect, and behavior unable to test      Neurological exam  Eyes closed non verbal moves head side to side resists eye opening  No follow commands   Conjugate eye movements  spontaneous movement of extremities         CBC:   Recent Labs     10/21/20  0330   WBC 5.4   HGB 9.7*          BMP:    Recent Labs     10/21/20  0340 10/21/20  0759   *  --    K 4.2  --    CL 81*  --    CO2 24  --    BUN 24*  --    CREATININE 2.9*  --    GLUCOSE 1,010* 982*       Hepatic:   Recent Labs     10/21/20  0340   AST 25   ALT 14   BILITOT 0.3   ALKPHOS 119*       Lipids: No results for input(s): CHOL, HDL in the last 72 hours. Invalid input(s): LDLCALCU    INR: No results for input(s): INR in the last 72 hours. CT HEAD WO CONTRAST [[id number]]      Resulted: 10/21/20 0648     Updated: 10/21/20 0650     Narrative:      Examination. CT HEAD WO CONTRAST 10/21/2020 3:15 AM   History: Altered mental status. DLP: 1723 mGycm. The CT scan of the head is performed without intravenous contrast   enhancement. The images are acquired in axial plane with subsequent   reconstruction in coronal and sagittal planes. The comparison is made with the previous study dated 10/8/2019. There are imaging artifacts due to patient motion which lowers the   diagnostic yield of the study. There is no evidence of a mass. There is no evidence of intracranial   hemorrhage or hematoma. The ventricles, the basal cistern and the   cortical sulci and unremarkable. Moderate chronic white matter   ischemic changes are similar to the previous study. The gray-white   matter differentiation is maintained. The images reviewed in bone window show no acute bony abnormality. The   visualized paranasal sinuses and mastoid air cells are unremarkable. Impression:      No acute intracranial abnormality. The above study was initially reviewed and reported by stat rads. I do   not find any discrepancies.           Assessment and Plan     Seizure x 2/ altered mental status-most likely triggered by severe hyperglycemia and HTN urgency  Hyponatremia/renal failure/anemia are contributing factors    Currently stuporous post seizure x 2/ativan and Keppra  EEG reviewed mildly slow no epileptiform activity noted  CT head no acute changes    Would continue Keppra for now as increased risk for subsequent seizures with underling medical issues    HTN urgency- Cardene drip  Severe Hyperglycemia-on insulin   DVT prophylaxis-Lovenox  ESRD-HD    Supportive care      Please feel free to call with any questions   [phone] (cell phone)    Dr Herrera Her certified in Neurology  Board Certified in Clinical Neurophysiology  Fellowship Trained in [place] [address] Neurophysiology    EMR Dragon/transcription disclaimer:Significant part of this  encounter note is electronic transcription/translation of spoken language to printed text. The electronic translation of spoken language may be erroneous, or at times, nonsensical words or phrases may be inadvertently transcribed.  Although I have reviewed the note for such errors, some may still exist.

## 2020-10-21 NOTE — CONSULTS
Renal Consult Note    Thank you to requesting provider: Dr Jacklyn Pemberton, for asking us to see Cuauhtemoc Cortez    Reason for consultation:  ESRD    Chief Complaint:  Change in mental status    History of Presenting Illness      Patient is a 45 y.o. woman who presents to the ER with altered mental status. Her blood sugar was over thousand. She has end-stage renal disease patient and receives maintenance dialysis (TTS) and is s/p dialysis on 10/20. At the emergency department, she had 2 seizures. Her blood pressure is 464 systolic and she was moved to [facility]. She was started on Cardene drip and nephrology was consulted to manage her ESRD. She is oliguric. No further history could be obtained except from medical chart and nursing record. She was obtunded, unresponsive.     Past Medical/Surgical History      Active Ambulatory Problems     Diagnosis Date Noted    Gastroesophageal reflux disease without esophagitis     Acquired hypothyroidism     Hyperkalemia 05/23/2017    Anemia in chronic kidney disease (CKD) 05/23/2017    Iron deficiency 05/23/2017    Family history of colon cancer 10/20/2017    Unintentional weight loss 10/20/2017    Type 2 diabetes mellitus with chronic kidney disease on chronic dialysis, with long-term current use of insulin ([address] 12/09/2017    Noncompliance of patient with renal dialysis ([address]     Respiratory failure ([address] 03/05/2018    Acute respiratory failure with hypoxia and hypercapnia ([address] 03/07/2018    Dialysis AV fistula malfunction (HCC) 06/27/2018    Gastroparesis 08/08/2018    Nausea and vomiting 08/08/2018    Chronic constipation 08/08/2018    Uncontrolled type 2 diabetes mellitus with complication, with long-term current use of insulin ([address] 08/08/2018    Abnormal CT of liver 08/15/2018    Other ascites 08/15/2018    ESRD (end stage renal disease) on dialysis ([address]     High hepatic iron concentration determined by biopsy of liver 12/05/2018    Steal syndrome as complication of dialysis access [facility]) 12/19/2018    PVD (peripheral vascular disease) ([address]     ESRD on hemodialysis ([address] 01/25/2019    Hyponatremia 01/26/2019    Hyperglycemia due to type 2 diabetes mellitus ([address] 01/26/2019    Normocytic anemia 01/26/2019    Volume overload 03/27/2019    Left homonymous hemianopsia     Acute intractable headache     Accelerated hypertension 04/20/2019    Medically noncompliant 04/20/2019    Noncompliance with medications     Seizure ([address] 10/08/2019    Hyperglycemic hyperosmolar nonketotic coma ([address]     Type 2 diabetes mellitus with hyperosmolar coma, with long-term current use of insulin ([address]     Palliative care patient 10/08/2019    Hypertensive emergency 06/24/2020    Chronic epigastric pain 08/18/2020    Chronic nausea 08/18/2020    Chronic vomiting 08/18/2020    Abnormal CT of the abdomen 08/18/2020    Poor appetite 08/18/2020    Personal history of colonic polyps 08/18/2020    Hyperosmolar syndrome 09/16/2020     Resolved Ambulatory Problems     Diagnosis Date Noted    Generalized abdominal pain 11/04/2016    Non-intractable vomiting with nausea 11/04/2016    Chronic constipation 11/04/2016    Dysphagia 11/04/2016    Heartburn 11/04/2016    Gastroparesis 03/25/2020    Hypertensive emergency     Hypoglycemia 05/22/2017    Hyperosmolarity due to secondary diabetes ([address] 12/09/2017    Hypertensive urgency, malignant     Acute encephalopathy     ESRD on hemodialysis ([address]     Hyponatremia 03/08/2018    Diabetic ketoacidosis without coma associated with type 2 diabetes mellitus ([address] 04/20/2019    Delirium 04/20/2019    Hypertensive encephalopathy 04/20/2019     Past Medical History:   Diagnosis Date    Arthritis     Chronic kidney disease, stage 5, kidney failure (HCC)     Closed fracture of right shoulder girdle, with routine healing, subsequent encounter     DM (diabetes mellitus) type II controlled with renal manifestation ([address] 06/1993    GERD (gastroesophageal reflux disease)     Hemod[facility] ([facility]     Hypertension     Hypothyroid     Nasal congestion     Restless leg syndrome          Review of Systems     Unable to obtain    Medications        Current Facility-Administered Medications:     albuterol (PROVENTIL) nebulizer solution 2.5 mg, 2.5 mg, Nebulization, Q4H PRN, Lorenzo Eldridge MD    dextrose 50 % IV solution, 12.5 g, Intravenous, PRN, Lorenzo Eldridge MD    potassium chloride 10 mEq/100 mL IVPB (Peripheral Line), 10 mEq, Intravenous, PRN, Lorenzo Eldridge MD    magnesium sulfate 1 g in dextrose 5% 100 mL IVPB, 1 g, Intravenous, PRN, Lorenzo Eldridge MD    sodium phosphate 10 mmol in dextrose 5 % 250 mL IVPB, 10 mmol, Intravenous, PRN **OR** sodium phosphate 15 mmol in dextrose 5 % 250 mL IVPB, 15 mmol, Intravenous, PRN **OR** sodium phosphate 20 mmol in dextrose 5 % 500 mL IVPB, 20 mmol, Intravenous, PRN, Lorenzo Eldridge MD    heparin (porcine) injection 5,000 Units, 5,000 Units, Subcutaneous, Q8H, Lorenzo Eldridge MD, 5,000 Units at 10/21/20 0840    niCARdipine (CARDENE) 50 mg in sodium chloride 0.9 % 250 mL infusion, 5 mg/hr, Intravenous, Continuous, Sherly Gander, DO, Last Rate: 50 mL/hr at 10/21/20 0838, 10 mg/hr at 10/21/20 0838    metoprolol (LOPRESSOR) injection 10 mg, 10 mg, Intravenous, Q6H, Sherly Gander, DO, 10 mg at 10/21/20 0800    0.9 % sodium chloride infusion, , Intravenous, Continuous, Sherly Gander, DO, Last Rate: 15 mL/hr at 10/21/20 0820    [START ON 10/22/2020] levetiracetam (KEPPRA) 500 mg/100 mL IVPB, 500 mg, Intravenous, Q24H, Sherly Gander, DO    insulin glargine (LANTUS) injection vial 30 Units, 30 Units, Subcutaneous, BID, Sherly Gander, DO, 30 Units at 10/21/20 1026    insulin lispro (HUMALOG) injection vial 0-18 Units, 0-18 Units, Subcutaneous, TID WC, Sherly Khan DO, 18 Units at 10/21/20 0901    insulin lispro (HUMALOG) injection vial 0-9 Units, 0-9 Units, Subcutaneous, Nightly, Rui Swanson,   No outpatient medications have been marked as taking for the 10/21/20 encounter [facility] Encounter). Allergies   Penicillins; Lamisil advanced [terbinafine]; Stadol [butorphanol]; and Reglan [metoclopramide]    Family History       Family History   Problem Relation Age of Onset    Colon Cancer Mother          at 63    Colon Polyps Mother     Lung Cancer Father          at 66    Colon Polyps Father     Stomach Cancer Sister     Breast Cancer Sister     Depression Daughter 25        committed suicide    Liver Cancer Neg Hx     Liver Disease Neg Hx     Esophageal Cancer Neg Hx     Rectal Cancer Neg Hx      Family history negative for kidney disease.      Social History      Social History     Socioeconomic History    Marital status: Single     Spouse name: None    Number of children: 2    Years of education: None    Highest education level: None   Occupational History    None   Social Needs    Financial resource strain: None    Food insecurity     Worry: None     Inability: None    Transportation needs     Medical: None     Non-medical: None   Tobacco Use    Smoking status: Current Every Day Smoker     Packs/day: 2.00     Years: 33.00     Pack years: 66.00     Types: Cigarettes    Smokeless tobacco: Never Used    Tobacco comment: NOW at 1/4 PD, started at age 24 and has averaged 2 PPD   Substance and Sexual Activity    Alcohol use: No    Drug use: Not Currently     Types: Marijuana    Sexual activity: Not Currently     Partners: Male   Lifestyle    Physical activity     Days per week: None     Minutes per session: None    Stress: None   Relationships    Social connections     Talks on phone: None     Gets together: None     Attends Holiness service: None     Active member of club or organization: None     Attends meetings of clubs or organizations: None     Relationship status: None    Intimate partner violence

## 2020-10-21 NOTE — PLAN OF CARE
Problem: Falls - Risk of:  Goal: Will remain free from falls  Description: Will remain free from falls  Outcome: Ongoing  Goal: Absence of physical injury  Description: Absence of physical injury  Outcome: Ongoing     Problem: Cardiac:  Goal: Ability to maintain an adequate cardiac output will improve  Description: Ability to maintain an adequate cardiac output will improve  Outcome: Ongoing  Goal: Hemodynamic stability will improve  Description: Hemodynamic stability will improve  Outcome: Ongoing     Problem: Breathing Pattern - Ineffective:  Goal: Ability to achieve and maintain a regular respiratory rate will improve  Description: Ability to achieve and maintain a regular respiratory rate will improve  Outcome: Ongoing

## 2020-10-21 NOTE — CONSULTS
Palliative Care:   Pt is known to palliative care team.  Presents to ED with AMS. Per ED notes pt had BG over 1000, BP in ICU this a.m. 174 systolic. Attempted to call pt son, no answer, no VM. Called and spoke with pt significant other for information on pt. Past Medical History:        Past Medical History:   Diagnosis Date    Arthritis     Chronic kidney disease, stage 5, kidney failure (HCC)     Closed fracture of right shoulder girdle, with routine healing, subsequent encounter     DM (diabetes mellitus) type II controlled with renal manifestation ([facility] 06/1993    Gastroparesis     GERD (gastroesophageal reflux disease)     Hemodialysis patient ([facility]     dialysis on tues, thur, and sat [facility]    Hypertension     Hypothyroid     Nasal congestion     recent    Noncompliance with medications     Palliative care patient 10/08/2019    Restless leg syndrome        Advance Directives: No AD on file. SO states he does not know if pt has AD. Asked if he knew if she had paperwork naming anyone a her HCS, he states \"I don't have anything like that\". Explained process for decision making, currently this would be son's responsibility. Unable to ACP at this time d/t pt confusion/AMS. Pain/Other Symptoms:    Pt is restless, thrashing legs. In restraints to protect IV lines. Activity:  Assist           Psychological/Spiritual:  Pt lives in the home with her SO. Does not have a [facility] home at this time. Plan:  Cardene and insulin gtt initiated, Nephrology and Neurology consulted      Patient/family discussion r/t goals:  Spoke with pt SO to obtain information. He tells me pt has c/o of feeling \"weak and wobbly\" in her legs and      Has started using a cane for amb. He reports pt was confused prompting the call to EMS. He states pt did have HD on Tuesday. Pt is a T,TH,Sat HD. Palliative following for supportive care.                 Electronically signed by Adore Lawrence Nicole Bergeron on 10/21/2020 at 11:33 AM

## 2020-10-22 LAB
ANION GAP SERPL CALCULATED.3IONS-SCNC: 14 MMOL/L (ref 7–19)
BASOPHILS ABSOLUTE: 0.1 K/UL (ref 0–0.2)
BASOPHILS RELATIVE PERCENT: 0.9 % (ref 0–1)
BUN BLDV-MCNC: 29 MG/DL (ref 6–20)
CALCIUM SERPL-MCNC: 8.1 MG/DL (ref 8.6–10)
CHLORIDE BLD-SCNC: 98 MMOL/L (ref 98–111)
CO2: 22 MMOL/L (ref 22–29)
CREAT SERPL-MCNC: 3.7 MG/DL (ref 0.5–0.9)
EKG P AXIS: 75 DEGREES
EKG P-R INTERVAL: 174 MS
EKG Q-T INTERVAL: 424 MS
EKG QRS DURATION: 94 MS
EKG QTC CALCULATION (BAZETT): 446 MS
EKG T AXIS: 69 DEGREES
EOSINOPHILS ABSOLUTE: 0.1 K/UL (ref 0–0.6)
EOSINOPHILS RELATIVE PERCENT: 0.9 % (ref 0–5)
GFR AFRICAN AMERICAN: 16
GFR NON-AFRICAN AMERICAN: 13
GLUCOSE BLD-MCNC: 112 MG/DL (ref 70–99)
GLUCOSE BLD-MCNC: 34 MG/DL (ref 70–99)
GLUCOSE BLD-MCNC: 43 MG/DL (ref 70–99)
GLUCOSE BLD-MCNC: 53 MG/DL (ref 70–99)
GLUCOSE BLD-MCNC: 53 MG/DL (ref 70–99)
GLUCOSE BLD-MCNC: 64 MG/DL (ref 70–99)
GLUCOSE BLD-MCNC: 67 MG/DL (ref 70–99)
GLUCOSE BLD-MCNC: 68 MG/DL (ref 74–109)
GLUCOSE BLD-MCNC: 71 MG/DL (ref 70–99)
GLUCOSE BLD-MCNC: 77 MG/DL (ref 70–99)
GLUCOSE BLD-MCNC: 83 MG/DL (ref 70–99)
GLUCOSE BLD-MCNC: 87 MG/DL (ref 70–99)
HCT VFR BLD CALC: 29.8 % (ref 37–47)
HEMOGLOBIN: 10.6 G/DL (ref 12–16)
IMMATURE GRANULOCYTES #: 0 K/UL
LYMPHOCYTES ABSOLUTE: 1.1 K/UL (ref 1.1–4.5)
LYMPHOCYTES RELATIVE PERCENT: 12.6 % (ref 20–40)
MAGNESIUM: 1.7 MG/DL (ref 1.6–2.6)
MCH RBC QN AUTO: 31.9 PG (ref 27–31)
MCHC RBC AUTO-ENTMCNC: 35.6 G/DL (ref 33–37)
MCV RBC AUTO: 89.8 FL (ref 81–99)
MONOCYTES ABSOLUTE: 0.5 K/UL (ref 0–0.9)
MONOCYTES RELATIVE PERCENT: 6 % (ref 0–10)
NEUTROPHILS ABSOLUTE: 6.8 K/UL (ref 1.5–7.5)
NEUTROPHILS RELATIVE PERCENT: 79.4 % (ref 50–65)
PDW BLD-RTO: 12.9 % (ref 11.5–14.5)
PERFORMED ON: ABNORMAL
PERFORMED ON: NORMAL
PHOSPHORUS: 2.5 MG/DL (ref 2.5–4.5)
PHOSPHORUS: 2.6 MG/DL (ref 2.5–4.5)
PLATELET # BLD: 122 K/UL (ref 130–400)
PMV BLD AUTO: 10.5 FL (ref 9.4–12.3)
POTASSIUM SERPL-SCNC: 2.7 MMOL/L (ref 3.5–5)
POTASSIUM SERPL-SCNC: 3.3 MMOL/L (ref 3.5–5)
POTASSIUM SERPL-SCNC: 3.6 MMOL/L (ref 3.5–5)
RBC # BLD: 3.32 M/UL (ref 4.2–5.4)
SODIUM BLD-SCNC: 134 MMOL/L (ref 136–145)
WBC # BLD: 8.6 K/UL (ref 4.8–10.8)

## 2020-10-22 PROCEDURE — 99233 SBSQ HOSP IP/OBS HIGH 50: CPT | Performed by: PSYCHIATRY & NEUROLOGY

## 2020-10-22 PROCEDURE — 82947 ASSAY GLUCOSE BLOOD QUANT: CPT

## 2020-10-22 PROCEDURE — 84132 ASSAY OF SERUM POTASSIUM: CPT

## 2020-10-22 PROCEDURE — 80048 BASIC METABOLIC PNL TOTAL CA: CPT

## 2020-10-22 PROCEDURE — 2580000003 HC RX 258: Performed by: INTERNAL MEDICINE

## 2020-10-22 PROCEDURE — 6370000000 HC RX 637 (ALT 250 FOR IP): Performed by: INTERNAL MEDICINE

## 2020-10-22 PROCEDURE — 8010000000 HC HEMODIALYSIS ACUTE INPT

## 2020-10-22 PROCEDURE — 6360000002 HC RX W HCPCS: Performed by: INTERNAL MEDICINE

## 2020-10-22 PROCEDURE — 85025 COMPLETE CBC W/AUTO DIFF WBC: CPT

## 2020-10-22 PROCEDURE — 2500000003 HC RX 250 WO HCPCS: Performed by: INTERNAL MEDICINE

## 2020-10-22 PROCEDURE — 83735 ASSAY OF MAGNESIUM: CPT

## 2020-10-22 PROCEDURE — 6360000002 HC RX W HCPCS: Performed by: HOSPITALIST

## 2020-10-22 PROCEDURE — 36415 COLL VENOUS BLD VENIPUNCTURE: CPT

## 2020-10-22 PROCEDURE — 84100 ASSAY OF PHOSPHORUS: CPT

## 2020-10-22 PROCEDURE — 2700000000 HC OXYGEN THERAPY PER DAY

## 2020-10-22 PROCEDURE — 2500000003 HC RX 250 WO HCPCS: Performed by: HOSPITALIST

## 2020-10-22 PROCEDURE — 1210000000 HC MED SURG R&B

## 2020-10-22 PROCEDURE — 2580000003 HC RX 258: Performed by: HOSPITALIST

## 2020-10-22 RX ORDER — NICOTINE POLACRILEX 4 MG
15 LOZENGE BUCCAL PRN
Status: DISCONTINUED | OUTPATIENT
Start: 2020-10-22 | End: 2020-10-26 | Stop reason: HOSPADM

## 2020-10-22 RX ORDER — DEXTROSE MONOHYDRATE 25 G/50ML
12.5 INJECTION, SOLUTION INTRAVENOUS PRN
Status: DISCONTINUED | OUTPATIENT
Start: 2020-10-22 | End: 2020-10-26 | Stop reason: HOSPADM

## 2020-10-22 RX ORDER — DEXTROSE MONOHYDRATE 50 MG/ML
100 INJECTION, SOLUTION INTRAVENOUS PRN
Status: DISCONTINUED | OUTPATIENT
Start: 2020-10-22 | End: 2020-10-26 | Stop reason: HOSPADM

## 2020-10-22 RX ORDER — NIFEDIPINE 60 MG/1
60 TABLET, EXTENDED RELEASE ORAL DAILY
Status: DISCONTINUED | OUTPATIENT
Start: 2020-10-22 | End: 2020-10-26 | Stop reason: HOSPADM

## 2020-10-22 RX ORDER — POTASSIUM CHLORIDE 7.45 MG/ML
10 INJECTION INTRAVENOUS
Status: COMPLETED | OUTPATIENT
Start: 2020-10-22 | End: 2020-10-22

## 2020-10-22 RX ORDER — ISOSORBIDE MONONITRATE 60 MG/1
120 TABLET, EXTENDED RELEASE ORAL DAILY
Status: DISCONTINUED | OUTPATIENT
Start: 2020-10-22 | End: 2020-10-26 | Stop reason: HOSPADM

## 2020-10-22 RX ORDER — METOPROLOL SUCCINATE 50 MG/1
100 TABLET, EXTENDED RELEASE ORAL DAILY
Status: DISCONTINUED | OUTPATIENT
Start: 2020-10-22 | End: 2020-10-26 | Stop reason: HOSPADM

## 2020-10-22 RX ORDER — LISINOPRIL 20 MG/1
20 TABLET ORAL 2 TIMES DAILY
Status: DISCONTINUED | OUTPATIENT
Start: 2020-10-22 | End: 2020-10-26 | Stop reason: HOSPADM

## 2020-10-22 RX ORDER — HYDRALAZINE HYDROCHLORIDE 50 MG/1
50 TABLET, FILM COATED ORAL EVERY 8 HOURS SCHEDULED
Status: DISCONTINUED | OUTPATIENT
Start: 2020-10-22 | End: 2020-10-23

## 2020-10-22 RX ADMIN — POTASSIUM CHLORIDE 10 MEQ: 7.46 INJECTION, SOLUTION INTRAVENOUS at 02:51

## 2020-10-22 RX ADMIN — DEXTROSE MONOHYDRATE 12.5 G: 500 INJECTION PARENTERAL at 12:25

## 2020-10-22 RX ADMIN — METOPROLOL TARTRATE 10 MG: 1 INJECTION, SOLUTION INTRAVENOUS at 02:16

## 2020-10-22 RX ADMIN — LEVETIRACETAM 500 MG: 5 INJECTION INTRAVENOUS at 05:04

## 2020-10-22 RX ADMIN — NIFEDIPINE 60 MG: 60 TABLET, FILM COATED, EXTENDED RELEASE ORAL at 12:10

## 2020-10-22 RX ADMIN — DEXTROSE MONOHYDRATE 12.5 G: 500 INJECTION PARENTERAL at 02:49

## 2020-10-22 RX ADMIN — DEXTROSE MONOHYDRATE 12.5 G: 25 INJECTION, SOLUTION INTRAVENOUS at 21:28

## 2020-10-22 RX ADMIN — HYDRALAZINE HYDROCHLORIDE 50 MG: 50 TABLET, FILM COATED ORAL at 18:26

## 2020-10-22 RX ADMIN — ISOSORBIDE MONONITRATE 120 MG: 60 TABLET, EXTENDED RELEASE ORAL at 12:10

## 2020-10-22 RX ADMIN — HYDRALAZINE HYDROCHLORIDE 50 MG: 50 TABLET, FILM COATED ORAL at 21:28

## 2020-10-22 RX ADMIN — POTASSIUM CHLORIDE 10 MEQ: 7.46 INJECTION, SOLUTION INTRAVENOUS at 04:00

## 2020-10-22 RX ADMIN — LISINOPRIL 20 MG: 20 TABLET ORAL at 21:28

## 2020-10-22 RX ADMIN — SODIUM CHLORIDE 10 MG/HR: 9 INJECTION, SOLUTION INTRAVENOUS at 01:19

## 2020-10-22 RX ADMIN — DEXTROSE MONOHYDRATE 12.5 G: 500 INJECTION PARENTERAL at 04:10

## 2020-10-22 RX ADMIN — DEXTROSE MONOHYDRATE 12.5 G: 500 INJECTION PARENTERAL at 03:15

## 2020-10-22 RX ADMIN — HEPARIN SODIUM 5000 UNITS: 5000 INJECTION INTRAVENOUS; SUBCUTANEOUS at 21:29

## 2020-10-22 RX ADMIN — HEPARIN SODIUM 5000 UNITS: 5000 INJECTION INTRAVENOUS; SUBCUTANEOUS at 09:17

## 2020-10-22 RX ADMIN — METOPROLOL SUCCINATE 100 MG: 50 TABLET, EXTENDED RELEASE ORAL at 09:18

## 2020-10-22 RX ADMIN — DEXTROSE MONOHYDRATE 12.5 G: 500 INJECTION PARENTERAL at 07:51

## 2020-10-22 RX ADMIN — SODIUM PHOSPHATE, MONOBASIC, MONOHYDRATE 10 MMOL: 276; 142 INJECTION, SOLUTION INTRAVENOUS at 05:39

## 2020-10-22 RX ADMIN — LISINOPRIL 20 MG: 20 TABLET ORAL at 12:10

## 2020-10-22 RX ADMIN — SODIUM CHLORIDE 4 MG/HR: 9 INJECTION, SOLUTION INTRAVENOUS at 07:23

## 2020-10-22 ASSESSMENT — ENCOUNTER SYMPTOMS
NAUSEA: 0
COLOR CHANGE: 0
CONSTIPATION: 0
VOMITING: 0
VOICE CHANGE: 0
BACK PAIN: 0
RHINORRHEA: 0
DIARRHEA: 0
SHORTNESS OF BREATH: 0
COUGH: 0

## 2020-10-22 NOTE — PROGRESS NOTES
Hospitalist Progress Note    Patient:  Walter Escalante  YOB: 1970  Date of Service: 10/22/2020  MRN: [id number]   Acct: [de-identified]   Primary Care Physician: ROMINA Ruvalcaba  Advance Directive: Full Code  Admit Date: 10/21/2020       Hospital Day: 1  Referring Provider: Elda Dickerson DO    Patient Seen, Chart, Consults, Notes, Labs, Radiology studies reviewed. Subjective:  Walter Escalante is a 45 y.o. female  whom we are following for end-stage renal disease, malignant hypertension, new onset seizures. Neurology is following. It is felt most likely the seizures were triggered by severe hyperglycemia and hypertensive urgency. She is clinically much improved today. She is receiving hemodialysis. She is able to take oral medications. We will transition her back to her oral antihypertensives. We will also plan for transfer out of ICU. Allergies:  Penicillins;  Lamisil advanced [terbinafine]; Stadol [butorphanol]; and Reglan [metoclopramide]    Medicines:  Current Facility-Administered Medications   Medication Dose Route Frequency Provider Last Rate Last Dose    hydrALAZINE (APRESOLINE) tablet 50 mg  50 mg Oral 3 times per day Elda Dickerson,         metoprolol succinate (TOPROL XL) extended release tablet 100 mg  100 mg Oral Daily Elda Dickerson, DO        lisinopril (PRINIVIL;ZESTRIL) tablet 20 mg  20 mg Oral BID [facility]        NIFEdipine (PROCARDIA XL) extended release tablet 60 mg  60 mg Oral Daily [facility], DO        isosorbide mononitrate (IMDUR) extended release tablet 120 mg  120 mg Oral Daily [facility]        albuterol (PROVENTIL) nebulizer solution 2.5 mg  2.5 mg Nebulization Q4H PRN Makenna Salmeron MD        dextrose 50 % IV solution  12.5 g Intravenous PRN Makenna Salmeron MD   12.5 g at 10/22/20 0751    potassium chloride 10 mEq/100 mL IVPB (Peripheral Line)  10 mEq Intravenous PRN Makenna Salmeron  mL/hr at 10/21/20 1221 10 mEq at 10/21/20 1221    magnesium sulfate 1 g in dextrose 5% 100 mL IVPB  1 g Intravenous PRN Joy Ware MD        sodium phosphate 10 mmol in dextrose 5 % 250 mL IVPB  10 mmol Intravenous PRN Joy Ware MD 62.5 mL/hr at 10/22/20 0539 10 mmol at 10/22/20 0539    Or    sodium phosphate 15 mmol in dextrose 5 % 250 mL IVPB  15 mmol Intravenous PRN Joy Ware MD        Or    sodium phosphate 20 mmol in dextrose 5 % 500 mL IVPB  20 mmol Intravenous PRN Joy Ware MD   Stopped at 10/21/20 2147    heparin (porcine) injection 5,000 Units  5,000 Units Subcutaneous Q8H Joy Ware MD   5,000 Units at 10/21/20 2337    levetiracetam (KEPPRA) 500 mg/100 mL IVPB  500 mg Intravenous Q24H Norrine Bakes, DO   Stopped at 10/22/20 0553    insulin glargine (LANTUS) injection vial 30 Units  30 Units Subcutaneous BID Norrine Bakes, DO   30 Units at 10/21/20 2003    insulin lispro (HUMALOG) injection vial 0-18 Units  0-18 Units Subcutaneous TID WC Norrine Bakes, DO   18 Units at 10/21/20 1712    insulin lispro (HUMALOG) injection vial 0-9 Units  0-9 Units Subcutaneous Nightly Norrine Bakes, DO   6 Units at 10/21/20 2003       Past Medical History:  Past Medical History:   Diagnosis Date    Arthritis     Chronic kidney disease, stage 5, kidney failure ([facility] [facility]     Closed fracture of right shoulder girdle, with routine healing, subsequent encounter     DM (diabetes mellitus) type II controlled with renal manifestation ([facility] [address] 06/1993    Gastroparesis     GERD (gastroesophageal reflux disease)     Hemodialysis patient ([facility] [address]     dialysis on tues, thur, and sat at [facility]    Hypertension     Hypothyroid     Nasal congestion     recent    Noncompliance with medications     Palliative care patient 10/08/2019    Restless leg syndrome        Past Surgical History:  Past Surgical History:   Procedure Laterality Date    BREAST SURGERY      infected milk gland removed, 2004    CHOLECYSTECTOMY, LAPAROSCOPIC      2008    COLONOSCOPY Left 9/17/2020    Dr Gaurav Weeks hepatic flexure-Severe tortuosity with severe spasming, 1 yr recall    DIALYSIS FISTULA CREATION Left 1/25/2019    REVISION LEFT UPPER EXTREMITY AV ACCESS WITH LEFT BRACHIAL ARTERY TO LEFT AXILLARY VEIN WITH  INTERPOSITIONAL ARTEGRAFT   performed by Katerin Catherine MD at [address]  2012    [address] Right 1/25/2019    INSERTION OF RIGHT INTERNAL JUGULAR HEMODIALYSIS CATHETER performed by Katerin Catherine MD at [address] [facility]  08/17/2018    1.  Moderately increased stainable iron identified by special stain in Kupffer cells and occasional hepatocytes. Negative for evidence of significant portal or lobular inflammation. Negative for evidence of significant fibrosis    [place] COLONOSCOPY W/BIOPSY SINGLE/MULTIPLE N/A 10/20/2017    Dr Bobby Swenson prep-Tubular AP (-) dysplasia x 2--3 yr recall    [place] EGD TRANSORAL BIOPSY SINGLE/MULTIPLE N/A 12/27/2016    Dr Ana Maria Vicente EGD TRANSORAL BIOPSY SINGLE/MULTIPLE N/A 10/20/2017    Dr Hayden Pat (-)   [facility] VASCULAR SURGERY Left 2016    fistula by Dr Jorge Barclay at [address]  10/02/2017    SJS. Left upper fistulograms/venograms, balloon angioplasty cephalic vein arch 99T56 conquest.    VASCULAR SURGERY  08/2018    FISTULOGRAM    VASCULAR SURGERY  10/29/2018    SJS. Left upper fistulograms/venograms    VASCULAR SURGERY  12/19/2018    SJS. Arch aortogram,left upper arteriograms.  VASCULAR SURGERY  03/06/2019    SJS. Removal of tunneled dialyis catheter right internal jugular vein.  VASCULAR SURGERY  08/28/2019    SJS. Left upper extremity fistulogram including venography to the superior vena cava. Balloon angioplasty left subclavian vein with 10mm x 40mm conquest balloon. Balloon angioplasty mid/proximal upper arm cephalic vein with 94VS x 40mm conquest balloon. Venograms after balloon Relationship status: Not on file    Intimate partner violence     Fear of current or ex partner: Not on file     Emotionally abused: Not on file     Physically abused: Not on file     Forced sexual activity: Not on file   Other Topics Concern    Not on file   Social History Narrative    Not on file         Review of Systems:    Review of Systems   Constitutional: Negative for activity change and fatigue. HENT: Negative for rhinorrhea and voice change. Eyes: Negative for visual disturbance. Respiratory: Negative for cough and shortness of breath. Cardiovascular: Negative for chest pain and leg swelling. Gastrointestinal: Negative for constipation, diarrhea, nausea and vomiting. Endocrine: Negative for polyuria. Genitourinary: Negative for difficulty urinating and dysuria. Musculoskeletal: Negative for arthralgias and back pain. Skin: Negative for color change. Allergic/Immunologic: Negative for immunocompromised state. Neurological: Negative for dizziness and headaches. Psychiatric/Behavioral: Negative for confusion. Objective:  Blood pressure (!) 143/58, pulse 65, temperature 96.6 °F (35.9 °C), temperature source Temporal, resp. rate 21, height 5' 7\" (1.702 m), weight 156 lb 3.2 oz (70.9 kg), SpO2 96 %. Intake/Output Summary (Last 24 hours) at 10/22/2020 0901  Last data filed at 10/22/2020 4191  Gross per 24 hour   Intake 2512.86 ml   Output 70 ml   Net 2442.86 ml       Physical Exam  Vitals signs and nursing note reviewed. HENT:      Head: Normocephalic and atraumatic. Right Ear: External ear normal.      Left Ear: External ear normal.      Nose: Nose normal.      Mouth/Throat:      Mouth: Mucous membranes are moist.   Eyes:      Conjunctiva/sclera: Conjunctivae normal.      Pupils: Pupils are equal, round, and reactive to light. Neck:      Musculoskeletal: Neck supple. No neck rigidity or muscular tenderness.    Cardiovascular:      Rate and Rhythm: Normal rate and SEDRATE in the last 72 hours. Cultures:   No results for input(s): CULTURE in the last 72 hours. No results for input(s): BC, April  in the last 72 hours. No results for input(s): CXSURG in the last 72 hours. Radiology reports as per the Radiologist  Radiology: Ct Head Wo Contrast    Result Date: 10/21/2020  Examination. CT HEAD WO CONTRAST 10/21/2020 3:15 AM History: Altered mental status. DLP: 1723 mGycm. The CT scan of the head is performed without intravenous contrast enhancement. The images are acquired in axial plane with subsequent reconstruction in coronal and sagittal planes. The comparison is made with the previous study dated 10/8/2019. There are imaging artifacts due to patient motion which lowers the diagnostic yield of the study. There is no evidence of a mass. There is no evidence of intracranial hemorrhage or hematoma. The ventricles, the basal cistern and the cortical sulci and unremarkable. Moderate chronic white matter ischemic changes are similar to the previous study. The gray-white matter differentiation is maintained. The images reviewed in bone window show no acute bony abnormality. The visualized paranasal sinuses and mastoid air cells are unremarkable. No acute intracranial abnormality. The above study was initially reviewed and reported by stat rads. I do not find any discrepancies. Signed by Dr Fili Olsen on 10/21/2020 6:48 AM       Assessment     Nonketotic hyperosmolar hyperglycemia. Resolved.     Malignant hypertension. Resolved. DC Cardene. Resume oral medications.     New onset seizure. Possibly related to #1 & 2. Continue Keppra.     End-stage renal disease.   Continue maintenance dialysis.     Please document 40 minutes of critical care time for patient assessment, chart review, , documentation.       Kelsey Martínez DO

## 2020-10-22 NOTE — PROGRESS NOTES
Per nurse pt is improved today. Currently undergoing HD. Will move to floor today. More alert and oriented today. Palliative will follow.   Electronically signed by Reed Beck RN on 10/22/2020 at 10:08 AM

## 2020-10-22 NOTE — PLAN OF CARE
Problem: Falls - Risk of:  Goal: Will remain free from falls  Description: Will remain free from falls  10/22/2020 0859 by Gucci Barker RN  Outcome: Ongoing  10/22/2020 0011 by Jumana Silvestre RN  Outcome: Ongoing  Goal: Absence of physical injury  Description: Absence of physical injury  10/22/2020 0859 by Gucci Barker RN  Outcome: Ongoing  10/22/2020 0011 by Jumana Silvestre RN  Outcome: Ongoing     Problem: Cardiac:  Goal: Ability to maintain an adequate cardiac output will improve  Description: Ability to maintain an adequate cardiac output will improve  10/22/2020 0859 by Gucci Barker RN  Outcome: Ongoing  10/22/2020 0011 by Jumana Silvestre RN  Outcome: Ongoing  Goal: Hemodynamic stability will improve  Description: Hemodynamic stability will improve  10/22/2020 0859 by Gucci Barker RN  Outcome: Ongoing  10/22/2020 0011 by Jumana Silvestre RN  Outcome: Ongoing     Problem: Breathing Pattern - Ineffective:  Goal: Ability to achieve and maintain a regular respiratory rate will improve  Description: Ability to achieve and maintain a regular respiratory rate will improve  10/22/2020 0859 by Gucci Barker RN  Outcome: Ongoing  10/22/2020 0011 by Jumana Silvestre RN  Outcome: Ongoing     Problem: Restraint Use - Nonviolent/Non-Self-Destructive Behavior:  Goal: Absence of restraint indications  Description: Absence of restraint indications  10/22/2020 0859 by Gucci Barker RN  Outcome: Ongoing  10/22/2020 0011 by Jumana Silvestre RN  Outcome: Ongoing  Goal: Absence of restraint-related injury  Description: Absence of restraint-related injury  10/22/2020 0859 by Gucci Barker RN  Outcome: Ongoing  10/22/2020 0011 by Jumana Silvestre RN  Outcome: Ongoing     Problem: Skin Integrity:  Goal: Will show no infection signs and symptoms  Description: Will show no infection signs and symptoms  10/22/2020 0859 by Gucci Barker RN  Outcome: Ongoing  10/22/2020 0011 by Jumana Silvestre RN  Outcome: Ongoing  Goal: Absence of new skin breakdown  Description: Absence of new skin breakdown  10/22/2020 0859 by Mauricio Corrales RN  Outcome: Ongoing  10/22/2020 0011 by Amna Hameed RN  Outcome: Ongoing

## 2020-10-22 NOTE — PROGRESS NOTES
Nephrology ([address] [facility]) Progress Note    Patient:  Walter Escalante  YOB: 1970  Date of Service: 10/22/2020  MRN: [id number]   Acct: [de-identified]   Primary Care Physician: ROMINA Ruvalcaba  Advance Directive: Full Code  Admit Date: 10/21/2020       Hospital Day: 1  Referring Provider: Elda Dickerson DO    Patient Seen, Chart, Consults, Notes, Labs, Radiology studies reviewed. Subjective:  Patient is a 53 y. o. woman who presents to the ER with altered mental status. Kady Lemuel blood sugar was over thousand. Fredi Diehl has end-stage renal disease patient and receives maintenance dialysis (TTS) and is s/p dialysis on 10/20.  At the emergency department, she had 2 seizures. Kady Lemuel blood pressure is 455 systolic and she was [facility] [place] [facility]. Fredi Diehl was started on Cardene drip and nephrology was consulted to manage her ESRD. She is oliguric. Patient has then responded to IV insulin. She was hypoglycemic this AM. She was seen and examined on dialysis. She was awake, responsive. Has been doing better. She remains on IV Cardene for severe hypertension. Dialysis   Pt was seen on RRT  Modality: Hemodialysis  Access: AVf  Location: left upper  QB:400  QD: 600  UF: 2.5 Liters    Allergies:  Penicillins;  Lamisil advanced [terbinafine]; Stadol [butorphanol]; and Reglan [metoclopramide]    Medicines:  Current Facility-Administered Medications   Medication Dose Route Frequency Provider Last Rate Last Dose    hydrALAZINE (APRESOLINE) tablet 50 mg  50 mg Oral 3 times per day Elda Dickerson,         metoprolol succinate (TOPROL XL) extended release tablet 100 mg  100 mg Oral Daily Elda Dickerson, DO        lisinopril (PRINIVIL;ZESTRIL) tablet 20 mg  20 mg Oral BID Elda Steelf, DO        NIFEdipine (PROCARDIA XL) extended release tablet 60 mg  60 mg Oral Daily Ramumon Hof, DO        isosorbide mononitrate (IMDUR) extended release tablet 120 mg  120 mg Oral Daily dexmedetomidine (PRECEDEX) 400 mcg in sodium chloride 0.9 % 100 mL infusion  0.2 mcg/kg/hr Intravenous Continuous Emiliirlanda DO Leigh   Stopped at 10/22/20 8915       Past Medical History:  Past Medical History:   Diagnosis Date    Arthritis     Chronic kidney disease, stage 5, kidney failure (HCC)     Closed fracture of right shoulder girdle, with routine healing, subsequent encounter     DM (diabetes mellitus) type II controlled with renal manifestation ([facility] [address] 06/1993    Gastroparesis     GERD (gastroesophageal reflux disease)     Hemodialysis patient ([facility] [address]     dialysis on tues, thur, and sat at [facility]    Hypertension     Hypothyroid     Nasal congestion     recent    Noncompliance with medications     Palliative care patient 10/08/2019    Restless leg syndrome        Past Surgical History:  Past Surgical History:   Procedure Laterality Date    BREAST SURGERY      infected milk gland removed, 2004    CHOLECYSTECTOMY, LAPAROSCOPIC      2008    COLONOSCOPY Left 9/17/2020    Dr Christina Tristan hepatic flexure-Severe tortuosity with severe spasming, 1 yr recall    DIALYSIS FISTULA CREATION Left 1/25/2019    REVISION LEFT UPPER EXTREMITY AV ACCESS WITH LEFT BRACHIAL ARTERY TO LEFT AXILLARY VEIN WITH  INTERPOSITIONAL ARTEGRAFT   performed by Kelsy Land MD at [address]  [address]    [address] Right 1/25/2019    INSERTION OF RIGHT INTERNAL JUGULAR HEMODIALYSIS CATHETER performed by Kelsy Land MD at [address] [facility]  08/17/2018    1.  Moderately increased stainable iron identified by special stain in Kupffer cells and occasional hepatocytes. Negative for evidence of significant portal or lobular inflammation.  Negative for evidence of significant fibrosis    [place] COLONOSCOPY W/BIOPSY SINGLE/MULTIPLE N/A 10/20/2017    Dr Ted Yu prep-Tubular AP (-) dysplasia x 2--3 yr recall    [place] EGD TRANSORAL BIOPSY SINGLE/MULTIPLE N/A 12/27/2016     Peter-normal    [place] EGD TRANSORAL BIOPSY SINGLE/MULTIPLE N/A 10/20/2017    Dr Lynda Shah (-)    TUBAL LIGATION          VASCULAR SURGERY Left 2016    fistula by Dr Santiago Senior at [address]  10/02/2017    SJS. Left upper fistulograms/venograms, balloon angioplasty cephalic vein arch 85Q48 conquest.    VASCULAR SURGERY  2018    FISTULOGRAM    VASCULAR SURGERY  10/29/2018    SJS. Left upper fistulograms/venograms    VASCULAR SURGERY  2018    SJS. Arch aortogram,left upper arteriograms.  VASCULAR SURGERY  2019    SJS. Removal of tunneled dialyis catheter right internal jugular vein.  VASCULAR SURGERY  2019    SJS. Left upper extremity fistulogram including venography to the superior vena cava. Balloon angioplasty left subclavian vein with 10mm x 40mm conquest balloon. Balloon angioplasty mid/proximal upper arm cephalic vein with 16NG x 40mm conquest balloon. Venograms after balloon angioplasty. Stent left mid/proximal upper arm cephalic vein stenosis fluency 10mm x 60mm self-expanding covered stent. Balloon     VASCULAR SURGERY  2019    cont angioplasty stent with 10mm x 40mm conquest balloon. Completion venograms left upper extremity.        Family History  Family History   Problem Relation Age of Onset    Colon Cancer Mother          at 63    Colon Polyps Mother     Lung Cancer Father          at 66    Colon Polyps Father     Stomach Cancer Sister     Breast Cancer Sister     Depression Daughter 25        committed suicide    Liver Cancer Neg Hx     Liver Disease Neg Hx     Esophageal Cancer Neg Hx     Rectal Cancer Neg Hx        Social History  Social History     Socioeconomic History    Marital status: Single     Spouse name: Not on file    Number of children: 2    Years of education: Not on file    Highest education level: Not on file   Occupational History    Not on file   Social Needs    Financial resource strain: Not on file    Food insecurity Worry: Not on file     Inability: Not on file    Transportation needs     Medical: Not on file     Non-medical: Not on file   Tobacco Use    Smoking status: Current Every Day Smoker     Packs/day: 2.00     Years: 33.00     Pack years: 66.00     Types: Cigarettes    Smokeless tobacco: Never Used    Tobacco comment: NOW at 1/4 PD, started at age 24 and has averaged 2 PPD   Substance and Sexual Activity    Alcohol use: No    Drug use: Not Currently     Types: Marijuana    Sexual activity: Not Currently     Partners: Male   Lifestyle    Physical activity     Days per week: Not on file     Minutes per session: Not on file    Stress: Not on file   Relationships    Social connections     Talks on phone: Not on file     Gets together: Not on file     Attends Jehovah's witness service: Not on file     Active member of club or organization: Not on file     Attends meetings of clubs or organizations: Not on file     Relationship status: Not on file    Intimate partner violence     Fear of current or ex partner: Not on file     Emotionally abused: Not on file     Physically abused: Not on file     Forced sexual activity: Not on file   Other Topics Concern    Not on file   Social History Narrative    Not on file         Review of Systems:  Reviewed with the patient at the bedside, 6 points reviewed and negative except as noted above.       Objective:  BP: 145/58   HR: 65  General: awake/alert   Chest:  clear to auscultation bilaterally without respiratory distress  CVS: regular rate and rhythm  Abdominal: soft, nontender, normal bowel sounds  Extremities: no cyanosis or edema  Skin: warm and dry without rash    Labs:  BMP:   Recent Labs     10/21/20  1045 10/21/20  1405 10/22/20  0151 10/22/20  0537   * 130* 134*  --    K 3.0* 3.0* 2.7* 3.3*   CL 91* 92* 98  --    CO2 26 25 22  --    PHOS 1.8* 1.5* 2.6  --    BUN 27* 28* 29*  --    CREATININE 3.6* 3.3* 3.7*  --    CALCIUM 8.7 9.0 8.1*  --      CBC:   Recent Labs 10/21/20  0330 10/22/20  0151   WBC 5.4 8.6   HGB 9.7* 10.6*   HCT 28.5* 29.8*   MCV 95.3 89.8    122*     LIVER PROFILE:   Recent Labs     10/21/20  0340   AST 25   ALT 14   BILITOT 0.3   ALKPHOS 119*     PT/INR: No results for input(s): PROTIME, INR in the last 72 hours. APTT: No results for input(s): APTT in the last 72 hours. BNP:  No results for input(s): BNP in the last 72 hours. Ionized Calcium:No results for input(s): IONCA in the last 72 hours. Magnesium:  Recent Labs     10/21/20  1045 10/21/20  1405 10/22/20  0151   MG 2.0 2.1 1.7     Phosphorus:  Recent Labs     10/21/20  1045 10/21/20  1405 10/22/20  0151   PHOS 1.8* 1.5* 2.6     HgbA1C: No results for input(s): LABA1C in the last 72 hours. Hepatic:   Recent Labs     10/21/20  0340   ALKPHOS 119*   ALT 14   AST 25   PROT 7.1   BILITOT 0.3   LABALBU 4.2     Lactic Acid:   Recent Labs     10/21/20  0330   LACTA 1.3     Troponin: No results for input(s): CKTOTAL, CKMB, TROPONINT in the last 72 hours. ABGs: No results for input(s): PH, PCO2, PO2, HCO3, O2SAT in the last 72 hours. CRP:  No results for input(s): CRP in the last 72 hours. Sed Rate:  No results for input(s): SEDRATE in the last 72 hours. Cultures:   No results for input(s): CULTURE in the last 72 hours. No results for input(s): BC, Caralee Roller in the last 72 hours. No results for input(s): CXSURG in the last 72 hours. Radiology reports as per the Radiologist  Radiology: Ct Head Wo Contrast    Result Date: 10/21/2020  Examination. CT HEAD WO CONTRAST 10/21/2020 3:15 AM History: Altered mental status. DLP: 1723 mGycm. The CT scan of the head is performed without intravenous contrast enhancement. The images are acquired in axial plane with subsequent reconstruction in coronal and sagittal planes. The comparison is made with the previous study dated 10/8/2019. There are imaging artifacts due to patient motion which lowers the diagnostic yield of the study.  There is no evidence of a mass. There is no evidence of intracranial hemorrhage or hematoma. The ventricles, the basal cistern and the cortical sulci and unremarkable. Moderate chronic white matter ischemic changes are similar to the previous study. The gray-white matter differentiation is maintained. The images reviewed in bone window show no acute bony abnormality. The visualized paranasal sinuses and mastoid air cells are unremarkable. No acute intracranial abnormality. The above study was initially reviewed and reported by stat rads. I do not find any discrepancies. Signed by Dr Mami Beck on 10/21/2020 6:48 AM       Assessment   1. ESRD  2. Type 2 DM with nephropathy, with severe hyperglycemia and hyperosmolarity  3. Seizures activities  4. Encephalopathy  5. Severe hypertension  6. UTI  7. Pseudohyponatremia  8. Anemia of CKD      Plan:  Dialysis as above. Wean off Cardene. Correct P, K. Follow up labs.       Marcos Baird MD  10/22/20  8:43 AM

## 2020-10-22 NOTE — PROGRESS NOTES
Called report to [facility]. Patient belongings sent with transport. Patient left unit via wheelchair.

## 2020-10-22 NOTE — PROGRESS NOTES
Cuauhtemoc Cortez received from ICU to room # 325 . Mental Status: Patient is oriented, alert, coherent, logical, thought processes intact and able to concentrate and follow conversation. Vitals:    10/22/20 1300   BP: (!) 159/64   Pulse: 75   Resp: 22   Temp:    SpO2: 94%     Placed on cardiac monitor: No.  Belongings: bag of clothing, etc.  with patient at bedside . Family at bedside No.  Oriented Patient to room. Call light within reach. Yes. Transfer was: Well tolerated by patient. .    Electronically signed by Bea Chin RN on 10/22/2020 at 2:07 PM

## 2020-10-22 NOTE — PROGRESS NOTES
Patient:   Ketan White  MR#:    [id number]   Room:    1248/652-15   YOB: 1970  Date of Progress Note: 10/22/2020  Time of Note                           7:36 AM  Consulting Physician:   Jp Randle M.D. Attending Physician:  Mikey Abraham DO     Chief complaint seizure AMS    S:This 45 y.o. female  with a past medical history significant for uncontrolled diabetes, HTN and ESRD on hemodialysis. The history is obtained from the chart and nursing as the patient is unable to provide a history due to her mental status. The patient was admitted to the hospital with altered mental status with seizure x 2, HTN urgency with blood pressure up to 146 systolic and severely elevated blood sugar> 1000. She has had pervious admissions to the hospital with hyperglycemia and HTN urgency with no significant change in her mental status. On the morning of admission the family went to wake her and they were unable to do so. In the Er she was confused and witnessed to have 2 brief seizures and given ativan and Keppra and transferred to ICU where she continued to be stuporous. No recent illnesses as per nursing. Afebrile with a normal white count of 5. 4. Overnight doing much better. Alert and oriented.      REVIEW OF SYSTEMS:  Constitutional: No fevers No chills  Neck:No stiffness  Respiratory: No shortness of breath  Cardiovascular: No chest pain No palpitations  Gastrointestinal: No abdominal pain    Genitourinary: No Dysuria  Neurological: No headache, no confusion    Past Medical History:      Diagnosis Date    Arthritis     Chronic kidney disease, stage 5, kidney failure (HCC)     Closed fracture of right shoulder girdle, with routine healing, subsequent encounter     DM (diabetes mellitus) type II controlled with renal manifestation [facility] 06/1993    Gastroparesis     GERD (gastroesophageal reflux disease)     [facility]     [facility] on tues, thur, and sat [facility]    Hypertension     Hypothyroid     Nasal congestion     recent    Noncompliance with medications     Palliative care patient 10/08/2019    Restless leg syndrome        Past Surgical History:      Procedure Laterality Date    BREAST SURGERY      infected milk gland removed, 2004    CHOLECYSTECTOMY, LAPAROSCOPIC      2008    COLONOSCOPY Left 9/17/2020    Dr Esa Montes hepatic flexure-Severe tortuosity with severe spasming, 1 yr recall    DIALYSIS FISTULA CREATION Left 1/25/2019    REVISION LEFT UPPER EXTREMITY AV ACCESS WITH LEFT BRACHIAL ARTERY TO LEFT AXILLARY VEIN WITH  INTERPOSITIONAL ARTEGRAFT   performed by Alejandro Donato MD at [address]  2012    [address] Right 1/25/2019    INSERTION OF RIGHT INTERNAL JUGULAR HEMODIALYSIS CATHETER performed by Alejandro Donato MD at [address] [facility]  08/17/2018    1.  Moderately increased stainable iron identified by special stain in Kupffer cells and occasional hepatocytes. Negative for evidence of significant portal or lobular inflammation. Negative for evidence of significant fibrosis    NV COLONOSCOPY W/BIOPSY SINGLE/MULTIPLE N/A 10/20/2017    Dr Eric García prep-Tubular AP (-) dysplasia x 2--3 yr recall    NV EGD TRANSORAL BIOPSY SINGLE/MULTIPLE N/A 12/27/2016    Dr Herr Sow EGD TRANSORAL BIOPSY SINGLE/MULTIPLE N/A 10/20/2017    Dr Joseph Rivera (-)   [address] [facility] VASCULAR SURGERY Left 2016    fistula by Dr Chester gM at [address]  10/02/2017    [facility]. Left upper fistulograms/venograms, balloon angioplasty cephalic vein arch [id number] conquest.    VASCULAR SURGERY  08/2018    FISTULOGRAM    VASCULAR SURGERY  10/29/2018    [facility]. Left upper fistulograms/venograms    VASCULAR SURGERY  12/19/2018    [facility]. Arch aortogram,left upper arteriograms.  VASCULAR SURGERY  03/06/2019    [facility]. Removal of tunneled dialyis catheter right internal jugular vein.  VASCULAR SURGERY  08/28/2019    [facility]. Left upper extremity fistulogram including venography to the superior vena cava. Balloon angioplasty left subclavian vein with 10mm x 40mm conquest balloon. Balloon angioplasty mid/proximal upper arm cephalic vein with 25HL x 40mm conquest balloon. Venograms after balloon angioplasty. Stent left mid/proximal upper arm cephalic vein stenosis fluency 10mm x 60mm self-expanding covered stent. Balloon     VASCULAR SURGERY  08/28/2019    cont angioplasty stent with 10mm x 40mm conquest balloon. Completion venograms left upper extremity.        Medications in Hospital:      Current Facility-Administered Medications:     albuterol (PROVENTIL) nebulizer solution 2.5 mg, 2.5 mg, Nebulization, Q4H PRN, Laci Jones MD    dextrose 50 % IV solution, 12.5 g, Intravenous, PRN, Laci Jones MD, 12.5 g at 10/22/20 0410    potassium chloride 10 mEq/100 mL IVPB (Peripheral Line), 10 mEq, Intravenous, PRN, Laci Jones MD, Last Rate: 100 mL/hr at 10/21/20 1221, 10 mEq at 10/21/20 1221    magnesium sulfate 1 g in dextrose 5% 100 mL IVPB, 1 g, Intravenous, PRN, Laci Jones MD    sodium phosphate 10 mmol in dextrose 5 % 250 mL IVPB, 10 mmol, Intravenous, PRN, Last Rate: 62.5 mL/hr at 10/22/20 0539, 10 mmol at 10/22/20 0539 **OR** sodium phosphate 15 mmol in dextrose 5 % 250 mL IVPB, 15 mmol, Intravenous, PRN **OR** sodium phosphate 20 mmol in dextrose 5 % 500 mL IVPB, 20 mmol, Intravenous, PRN, Laci Jones MD, Stopped at 10/21/20 2147    heparin (porcine) injection 5,000 Units, 5,000 Units, Subcutaneous, Q8H, Laci Jones MD, 5,000 Units at 10/21/20 2337    niCARdipine (CARDENE) 50 mg in sodium chloride 0.9 % 250 mL infusion, 5 mg/hr, Intravenous, Continuous, Tristian Clines, DO, Last Rate: 20 mL/hr at 10/22/20 0723, 4 mg/hr at 10/22/20 0723    metoprolol (LOPRESSOR) injection 10 mg, 10 mg, Intravenous, Q6H, Tristian Hubbard DO, 10 mg at 10/22/20 0216    0.9 % sodium chloride infusion, , Intravenous, Continuous, Dheeraj Giraldo DO, Last Rate: 15 mL/hr at 10/22/20 0600    levetiracetam (KEPPRA) 500 mg/100 mL IVPB, 500 mg, Intravenous, Q24H, Dheeraj Giraldo DO, Stopped at 10/22/20 0526    insulin glargine (LANTUS) injection vial 30 Units, 30 Units, Subcutaneous, BID, Dheeraj Giraldo DO, 30 Units at 10/21/20 2003    insulin lispro (HUMALOG) injection vial 0-18 Units, 0-18 Units, Subcutaneous, TID WC, Dheeraj Giraldo DO, 18 Units at 10/21/20 1712    insulin lispro (HUMALOG) injection vial 0-9 Units, 0-9 Units, Subcutaneous, Nightly, Dheeraj Giraldo DO, 6 Units at 10/21/20 2003    dexmedetomidine (PRECEDEX) 400 mcg in sodium chloride 0.9 % 100 mL infusion, 0.2 mcg/kg/hr, Intravenous, Continuous, Dheeraj Giraldo DO, Stopped at 10/22/20 3609    Allergies:  Penicillins; Lamisil advanced [terbinafine]; Stadol [butorphanol]; and Reglan [metoclopramide]    Social History:   TOBACCO:   reports that she has been smoking cigarettes. She has a 66.00 pack-year smoking history. She has never used smokeless tobacco.  ETOH:   reports no history of alcohol use. Family History:       Problem Relation Age of Onset    Colon Cancer Mother          at 63    Colon Polyps Mother     Lung Cancer Father          at 66    Colon Polyps Father     Stomach Cancer Sister     Breast Cancer Sister     Depression Daughter 25        committed suicide    Liver Cancer Neg Hx     Liver Disease Neg Hx     Esophageal Cancer Neg Hx     Rectal Cancer Neg Hx          PHYSICAL EXAM:  BP (!) 188/63   Pulse 84   Temp 98.3 °F (36.8 °C) (Temporal)   Resp 23   Ht 5' 7\" (1.702 m)   Wt 156 lb 3.2 oz (70.9 kg)   SpO2 94%   BMI 24.46 kg/m²     Constitutional - well developed, well nourished.    Eyes - conjunctiva normal.   Ear, nose, throat - No scars, masses, or lesions over external nose or ears, no atrophy of tongue  Neck-symmetric, no masses noted, no jugular vein distension  Respiration- chest wall appears symmetric, good expansion,   normal effort without use of accessory muscles  Musculoskeletal - no significant wasting of muscles noted, no bony deformities  Extremities-no clubbing, cyanosis or edema  Skin - warm, dry, and intact. No rash, erythema, or pallor. Psychiatric - mood, affect, and behavior appear normal.      Neurological exam  Awake, alert, fluent oriented x 3 appropriate affect  Attention and concentration appear appropriate  Recent and remote memory appears unremarkable  Speech normal without dysarthria  No clear issues with language of fund of knowledge     Cranial Nerve Exam     CN III, IV,VI-EOMI, No nystagmus, conjugate eye movements, no ptosis    CN VII-no facial assymetry       Motor Exam  antigravity throughout upper and lower extremities bilaterally      Tremors- no tremors in hands or head noted     Gait  Not tested      Nursing/pcp notes, imaging,labs and vitals reviewed.      PT,OT and/or speech notes reviewed    Lab Results   Component Value Date    WBC 8.6 10/22/2020    HGB 10.6 (L) 10/22/2020    HCT 29.8 (L) 10/22/2020    MCV 89.8 10/22/2020     (L) 10/22/2020     Lab Results   Component Value Date     (L) 10/22/2020    K 3.3 (L) 10/22/2020    CL 98 10/22/2020    CO2 22 10/22/2020    BUN 29 (H) 10/22/2020    CREATININE 3.7 (H) 10/22/2020    GLUCOSE 68 (L) 10/22/2020    CALCIUM 8.1 (L) 10/22/2020    PROT 7.1 10/21/2020    LABALBU 4.2 10/21/2020    BILITOT 0.3 10/21/2020    ALKPHOS 119 (H) 10/21/2020    AST 25 10/21/2020    ALT 14 10/21/2020    LABGLOM 13 (A) 10/22/2020    GFRAA 16 (L) 10/22/2020     Lab Results   Component Value Date    INR 1.07 06/24/2020    INR 1.10 10/08/2019    INR 1.03 01/25/2019    PROTIME 13.8 06/24/2020    PROTIME 13.6 10/08/2019    PROTIME 12.9 01/25/2019             RECORD REVIEW: Previous medical records, medications were reviewed at today's visit    IMPRESSION:    Seizure x 2/ altered mental status-most likely triggered by severe hyperglycemia and

## 2020-10-22 NOTE — PLAN OF CARE
Problem: Falls - Risk of:  Goal: Will remain free from falls  Description: Will remain free from falls  10/22/2020 0011 by Agatha Henriquez RN  Outcome: Ongoing  10/21/2020 1035 by Kathe Servin RN  Outcome: Ongoing  Goal: Absence of physical injury  Description: Absence of physical injury  10/22/2020 0011 by Agatha Henriquez RN  Outcome: Ongoing  10/21/2020 1035 by Kathe Servin RN  Outcome: Ongoing     Problem: Cardiac:  Goal: Ability to maintain an adequate cardiac output will improve  Description: Ability to maintain an adequate cardiac output will improve  10/22/2020 0011 by Agatha Henriquez RN  Outcome: Ongoing  10/21/2020 1035 by Kathe Servin RN  Outcome: Ongoing  Goal: Hemodynamic stability will improve  Description: Hemodynamic stability will improve  10/22/2020 0011 by Agatha Henriquez RN  Outcome: Ongoing  10/21/2020 1035 by Kathe Servin RN  Outcome: Ongoing     Problem: Breathing Pattern - Ineffective:  Goal: Ability to achieve and maintain a regular respiratory rate will improve  Description: Ability to achieve and maintain a regular respiratory rate will improve  10/22/2020 0011 by Agatha Henriquez RN  Outcome: Ongoing  10/21/2020 1035 by Kathe Servin RN  Outcome: Ongoing     Problem: Restraint Use - Nonviolent/Non-Self-Destructive Behavior:  Goal: Absence of restraint indications  Description: Absence of restraint indications  Outcome: Ongoing  Goal: Absence of restraint-related injury  Description: Absence of restraint-related injury  Outcome: Ongoing     Problem: Skin Integrity:  Goal: Will show no infection signs and symptoms  Description: Will show no infection signs and symptoms  Outcome: Ongoing  Goal: Absence of new skin breakdown  Description: Absence of new skin breakdown  Outcome: Ongoing

## 2020-10-23 ENCOUNTER — APPOINTMENT (OUTPATIENT)
Dept: GENERAL RADIOLOGY | Age: 50
DRG: 637 | End: 2020-10-23
Payer: MEDICARE

## 2020-10-23 LAB
ANION GAP SERPL CALCULATED.3IONS-SCNC: 12 MMOL/L (ref 7–19)
ANION GAP SERPL CALCULATED.3IONS-SCNC: 9 MMOL/L (ref 7–19)
BASOPHILS ABSOLUTE: 0.1 K/UL (ref 0–0.2)
BASOPHILS RELATIVE PERCENT: 1.3 % (ref 0–1)
BUN BLDV-MCNC: 24 MG/DL (ref 6–20)
BUN BLDV-MCNC: 25 MG/DL (ref 6–20)
CALCIUM SERPL-MCNC: 8.4 MG/DL (ref 8.6–10)
CALCIUM SERPL-MCNC: 8.6 MG/DL (ref 8.6–10)
CHLORIDE BLD-SCNC: 96 MMOL/L (ref 98–111)
CHLORIDE BLD-SCNC: 96 MMOL/L (ref 98–111)
CO2: 27 MMOL/L (ref 22–29)
CO2: 28 MMOL/L (ref 22–29)
CREAT SERPL-MCNC: 3.3 MG/DL (ref 0.5–0.9)
CREAT SERPL-MCNC: 3.6 MG/DL (ref 0.5–0.9)
EKG P AXIS: 72 DEGREES
EKG P-R INTERVAL: 140 MS
EKG Q-T INTERVAL: 382 MS
EKG QRS DURATION: 90 MS
EKG QTC CALCULATION (BAZETT): 425 MS
EKG T AXIS: 111 DEGREES
EOSINOPHILS ABSOLUTE: 0.3 K/UL (ref 0–0.6)
EOSINOPHILS RELATIVE PERCENT: 3.5 % (ref 0–5)
GFR AFRICAN AMERICAN: 16
GFR AFRICAN AMERICAN: 18
GFR NON-AFRICAN AMERICAN: 13
GFR NON-AFRICAN AMERICAN: 15
GLUCOSE BLD-MCNC: 156 MG/DL (ref 70–99)
GLUCOSE BLD-MCNC: 193 MG/DL (ref 70–99)
GLUCOSE BLD-MCNC: 312 MG/DL (ref 70–99)
GLUCOSE BLD-MCNC: 79 MG/DL (ref 74–109)
GLUCOSE BLD-MCNC: 81 MG/DL (ref 70–99)
GLUCOSE BLD-MCNC: 94 MG/DL (ref 70–99)
GLUCOSE BLD-MCNC: 94 MG/DL (ref 74–109)
HCT VFR BLD CALC: 26.3 % (ref 37–47)
HEMOGLOBIN: 9 G/DL (ref 12–16)
IMMATURE GRANULOCYTES #: 0 K/UL
LYMPHOCYTES ABSOLUTE: 1.2 K/UL (ref 1.1–4.5)
LYMPHOCYTES RELATIVE PERCENT: 16.2 % (ref 20–40)
MAGNESIUM: 2.1 MG/DL (ref 1.6–2.6)
MAGNESIUM: 2.1 MG/DL (ref 1.6–2.6)
MCH RBC QN AUTO: 32.1 PG (ref 27–31)
MCHC RBC AUTO-ENTMCNC: 34.2 G/DL (ref 33–37)
MCV RBC AUTO: 93.9 FL (ref 81–99)
MONOCYTES ABSOLUTE: 0.5 K/UL (ref 0–0.9)
MONOCYTES RELATIVE PERCENT: 6.8 % (ref 0–10)
NEUTROPHILS ABSOLUTE: 5.2 K/UL (ref 1.5–7.5)
NEUTROPHILS RELATIVE PERCENT: 71.9 % (ref 50–65)
PDW BLD-RTO: 13.2 % (ref 11.5–14.5)
PERFORMED ON: ABNORMAL
PERFORMED ON: NORMAL
PERFORMED ON: NORMAL
PHOSPHORUS: 3.2 MG/DL (ref 2.5–4.5)
PLATELET # BLD: 106 K/UL (ref 130–400)
PMV BLD AUTO: 10.6 FL (ref 9.4–12.3)
POTASSIUM REFLEX MAGNESIUM: 4.5 MMOL/L (ref 3.5–5)
POTASSIUM SERPL-SCNC: 4.4 MMOL/L (ref 3.5–5)
PRO-BNP: ABNORMAL PG/ML (ref 0–900)
RBC # BLD: 2.8 M/UL (ref 4.2–5.4)
SODIUM BLD-SCNC: 133 MMOL/L (ref 136–145)
SODIUM BLD-SCNC: 135 MMOL/L (ref 136–145)
TROPONIN: 0.07 NG/ML (ref 0–0.03)
TROPONIN: 0.07 NG/ML (ref 0–0.03)
TROPONIN: 0.08 NG/ML (ref 0–0.03)
URINE CULTURE, ROUTINE: NORMAL
WBC # BLD: 7.2 K/UL (ref 4.8–10.8)

## 2020-10-23 PROCEDURE — 93010 ELECTROCARDIOGRAM REPORT: CPT | Performed by: INTERNAL MEDICINE

## 2020-10-23 PROCEDURE — 85025 COMPLETE CBC W/AUTO DIFF WBC: CPT

## 2020-10-23 PROCEDURE — 1210000000 HC MED SURG R&B

## 2020-10-23 PROCEDURE — 2580000003 HC RX 258: Performed by: INTERNAL MEDICINE

## 2020-10-23 PROCEDURE — 84100 ASSAY OF PHOSPHORUS: CPT

## 2020-10-23 PROCEDURE — 83735 ASSAY OF MAGNESIUM: CPT

## 2020-10-23 PROCEDURE — 6360000002 HC RX W HCPCS: Performed by: INTERNAL MEDICINE

## 2020-10-23 PROCEDURE — 6370000000 HC RX 637 (ALT 250 FOR IP): Performed by: INTERNAL MEDICINE

## 2020-10-23 PROCEDURE — 93005 ELECTROCARDIOGRAM TRACING: CPT | Performed by: INTERNAL MEDICINE

## 2020-10-23 PROCEDURE — 71045 X-RAY EXAM CHEST 1 VIEW: CPT

## 2020-10-23 PROCEDURE — 84484 ASSAY OF TROPONIN QUANT: CPT

## 2020-10-23 PROCEDURE — 83880 ASSAY OF NATRIURETIC PEPTIDE: CPT

## 2020-10-23 PROCEDURE — 82947 ASSAY GLUCOSE BLOOD QUANT: CPT

## 2020-10-23 PROCEDURE — 36415 COLL VENOUS BLD VENIPUNCTURE: CPT

## 2020-10-23 PROCEDURE — 99232 SBSQ HOSP IP/OBS MODERATE 35: CPT | Performed by: PSYCHIATRY & NEUROLOGY

## 2020-10-23 PROCEDURE — 2500000003 HC RX 250 WO HCPCS: Performed by: INTERNAL MEDICINE

## 2020-10-23 PROCEDURE — 80048 BASIC METABOLIC PNL TOTAL CA: CPT

## 2020-10-23 PROCEDURE — 6370000000 HC RX 637 (ALT 250 FOR IP): Performed by: HOSPITALIST

## 2020-10-23 PROCEDURE — 6370000000 HC RX 637 (ALT 250 FOR IP)

## 2020-10-23 PROCEDURE — 8010000000 HC HEMODIALYSIS ACUTE INPT

## 2020-10-23 RX ORDER — ONDANSETRON 2 MG/ML
4 INJECTION INTRAMUSCULAR; INTRAVENOUS EVERY 6 HOURS PRN
Status: DISCONTINUED | OUTPATIENT
Start: 2020-10-23 | End: 2020-10-26 | Stop reason: HOSPADM

## 2020-10-23 RX ORDER — LEVETIRACETAM 500 MG/1
500 TABLET ORAL DAILY
Status: DISCONTINUED | OUTPATIENT
Start: 2020-10-24 | End: 2020-10-26

## 2020-10-23 RX ORDER — NITROGLYCERIN 0.4 MG/1
0.4 TABLET SUBLINGUAL EVERY 5 MIN PRN
Status: DISCONTINUED | OUTPATIENT
Start: 2020-10-23 | End: 2020-10-26 | Stop reason: HOSPADM

## 2020-10-23 RX ORDER — HYDRALAZINE HYDROCHLORIDE 25 MG/1
25 TABLET, FILM COATED ORAL EVERY 4 HOURS PRN
Status: DISCONTINUED | OUTPATIENT
Start: 2020-10-23 | End: 2020-10-23

## 2020-10-23 RX ORDER — HYDRALAZINE HYDROCHLORIDE 20 MG/ML
5 INJECTION INTRAMUSCULAR; INTRAVENOUS EVERY 4 HOURS PRN
Status: DISCONTINUED | OUTPATIENT
Start: 2020-10-23 | End: 2020-10-24

## 2020-10-23 RX ORDER — LABETALOL HYDROCHLORIDE 5 MG/ML
10 INJECTION, SOLUTION INTRAVENOUS ONCE
Status: DISCONTINUED | OUTPATIENT
Start: 2020-10-23 | End: 2020-10-26 | Stop reason: HOSPADM

## 2020-10-23 RX ORDER — NITROGLYCERIN 0.4 MG/1
TABLET SUBLINGUAL
Status: COMPLETED
Start: 2020-10-23 | End: 2020-10-23

## 2020-10-23 RX ORDER — HYDRALAZINE HYDROCHLORIDE 25 MG/1
75 TABLET, FILM COATED ORAL EVERY 8 HOURS SCHEDULED
Status: DISCONTINUED | OUTPATIENT
Start: 2020-10-23 | End: 2020-10-26 | Stop reason: HOSPADM

## 2020-10-23 RX ORDER — LABETALOL HYDROCHLORIDE 5 MG/ML
INJECTION, SOLUTION INTRAVENOUS
Status: DISPENSED
Start: 2020-10-23 | End: 2020-10-23

## 2020-10-23 RX ADMIN — HYDRALAZINE HYDROCHLORIDE 75 MG: 25 TABLET, FILM COATED ORAL at 05:25

## 2020-10-23 RX ADMIN — NITROGLYCERIN: 0.4 TABLET SUBLINGUAL at 08:45

## 2020-10-23 RX ADMIN — LISINOPRIL 20 MG: 20 TABLET ORAL at 21:45

## 2020-10-23 RX ADMIN — ISOSORBIDE MONONITRATE 120 MG: 60 TABLET, EXTENDED RELEASE ORAL at 10:22

## 2020-10-23 RX ADMIN — LISINOPRIL 20 MG: 20 TABLET ORAL at 10:23

## 2020-10-23 RX ADMIN — HYDRALAZINE HYDROCHLORIDE 75 MG: 25 TABLET, FILM COATED ORAL at 21:45

## 2020-10-23 RX ADMIN — METOPROLOL SUCCINATE 100 MG: 50 TABLET, EXTENDED RELEASE ORAL at 10:23

## 2020-10-23 RX ADMIN — HEPARIN SODIUM 5000 UNITS: 5000 INJECTION INTRAVENOUS; SUBCUTANEOUS at 10:24

## 2020-10-23 RX ADMIN — INSULIN LISPRO 6 UNITS: 100 INJECTION, SOLUTION INTRAVENOUS; SUBCUTANEOUS at 21:46

## 2020-10-23 RX ADMIN — INSULIN LISPRO 3 UNITS: 100 INJECTION, SOLUTION INTRAVENOUS; SUBCUTANEOUS at 12:06

## 2020-10-23 RX ADMIN — LEVETIRACETAM 500 MG: 5 INJECTION INTRAVENOUS at 05:24

## 2020-10-23 RX ADMIN — NIFEDIPINE 60 MG: 60 TABLET, FILM COATED, EXTENDED RELEASE ORAL at 10:23

## 2020-10-23 RX ADMIN — DEXTROSE MONOHYDRATE 5 MG/HR: 50 INJECTION, SOLUTION INTRAVENOUS at 10:02

## 2020-10-23 NOTE — PROGRESS NOTES
Pt is going to dialysis and then to Rm 308. Pt's Cardene gtt is off. She has had no more chest pain since coming to CCU this am.  Pt's only complaint has been \"hunger. \"  She does have a temp of 100.2.

## 2020-10-23 NOTE — PROGRESS NOTES
Hospitalist Progress Note  10/23/2020 7:59 AM  Subjective:   Admit Date: 10/21/2020  PCP: ROMINA Wilkins    Subjective:   Rapid response this morning for chest pain. BP elevated up to 209/84. Evaluated her at bedside. She was complaining of centralized chest pain (10/10), feels like she cannot breathe. Increasing hypoxia of 78% on room air, currently saturating in low to mid 90s on 5 L nasal cannula. Denies focal neuro deficits. Given 0.4 mg of sublingual nitro with mild improvement of blood pressure to 302 systolic. This did improve her chest pain to 5/5. Then given 10 mg of labetalol with little response. Given another 0.4 sublingual nitro with improvement of chest pain to 1/10. Her EKG has no changes compared with prior. Ordered stat BMP, BNP, troponin, Mg. Stat CXR done with final read pending but does show pulm edema on my review. BP still 784 systolic. Will place her back on nicardipine gtt and transfer back to ICU. RN to notify nephrology. ROS: Six point review of systems is negative except as specifically addressed above.     DIET RENAL; Carb Control: 3 carb choices (45 gms)/meal    Intake/Output Summary (Last 24 hours) at 10/23/2020 0759  Last data filed at 10/22/2020 2103  Gross per 24 hour   Intake 554.17 ml   Output 2500 ml   Net -1945.83 ml     Medications:   niCARdipine      dextrose       Current Facility-Administered Medications   Medication Dose Route Frequency Provider Last Rate Last Dose    hydrALAZINE (APRESOLINE) tablet 75 mg  75 mg Oral 3 times per day Kuldeep Hernandez MD   75 mg at 10/23/20 0525    hydrALAZINE (APRESOLINE) tablet 25 mg  25 mg Oral Q4H PRN Kuldeep Hernandez MD        nitroGLYCERIN (NITROSTAT) 0.4 MG SL tablet             labetalol (NORMODYNE;TRANDATE) 5 MG/ML injection             labetalol (NORMODYNE;TRANDATE) injection 10 mg  10 mg Intravenous Once Camron Lin MD        nitroGLYCERIN (NITROSTAT) SL tablet 0.4 mg  0.4 mg Sublingual Q5 Min PRN Ru Lemos Stacy Miller MD        niCARdipine (CARDENE) 25 mg in dextrose 5 % 250 mL infusion  5 mg/hr Intravenous Continuous Mily Barrios MD        metoprolol succinate (TOPROL XL) extended release tablet 100 mg  100 mg Oral Daily Reatha Bark, DO   100 mg at 10/22/20 3295    lisinopril (PRINIVIL;ZESTRIL) tablet 20 mg  20 mg Oral BID Reatha Bark, DO   20 mg at 10/22/20 2128    NIFEdipine (PROCARDIA XL) extended release tablet 60 mg  60 mg Oral Daily Reatha Bark, DO   60 mg at 10/22/20 1210    isosorbide mononitrate (IMDUR) extended release tablet 120 mg  120 mg Oral Daily Reatha Bark, DO   120 mg at 10/22/20 1210    glucose (GLUTOSE) 40 % oral gel 15 g  15 g Oral PRN Reatha Bark, DO        dextrose 50 % IV solution  12.5 g Intravenous PRN Reatha Bark, DO   12.5 g at 10/22/20 2128    glucagon (rDNA) injection 1 mg  1 mg Intramuscular PRN Reatha Bark, DO        dextrose 5 % solution  100 mL/hr Intravenous PRN Reatha Bark, DO        albuterol (PROVENTIL) nebulizer solution 2.5 mg  2.5 mg Nebulization Q4H PRN Reatha Bark, DO        dextrose 50 % IV solution  12.5 g Intravenous PRN Reatha Bark, DO   12.5 g at 10/22/20 1225    potassium chloride 10 mEq/100 mL IVPB (Peripheral Line)  10 mEq Intravenous PRN Reatha Bark,  mL/hr at 10/21/20 1221 10 mEq at 10/21/20 1221    magnesium sulfate 1 g in dextrose 5% 100 mL IVPB  1 g Intravenous PRN Reatha Bark, DO        sodium phosphate 10 mmol in dextrose 5 % 250 mL IVPB  10 mmol Intravenous PRN Reatha Bark, DO   Stopped at 10/22/20 2038    Or    sodium phosphate 15 mmol in dextrose 5 % 250 mL IVPB  15 mmol Intravenous PRN Reatha Bark, DO        Or    sodium phosphate 20 mmol in dextrose 5 % 500 mL IVPB  20 mmol Intravenous PRN Reatha Bark, DO   Stopped at 10/21/20 2147    heparin (porcine) injection 5,000 Units  5,000 Units Subcutaneous Q8H Zac Martínez [place] Spire, DO   5,000 Units at 10/22/20 2129    levetiracetam (KEPPRA) 500 mg/100 mL IVPB  500 mg Intravenous Q24H Kelsey Martínez, DO   Stopped at 10/23/20 6651    insulin glargine (LANTUS) injection vial 30 Units  30 Units Subcutaneous BID Kelsey Martínez, DO   30 Units at 10/21/20 2003    insulin lispro (HUMALOG) injection vial 0-18 Units  0-18 Units Subcutaneous TID WC Kelsey Martínez, DO   18 Units at 10/21/20 1712    insulin lispro (HUMALOG) injection vial 0-9 Units  0-9 Units Subcutaneous Nightly Kelsey Martínez, DO   6 Units at 10/21/20 2003        Labs:     Recent Labs     10/21/20  0330 10/22/20  0151 10/23/20  0511   WBC 5.4 8.6 7.2   RBC 2.99* 3.32* 2.80*   HGB 9.7* 10.6* 9.0*   HCT 28.5* 29.8* 26.3*   MCV 95.3 89.8 93.9   MCH 32.4* 31.9* 32.1*   MCHC 34.0 35.6 34.2    122* 106*     Recent Labs     10/21/20  1405 10/22/20  0151 10/22/20  0537 10/22/20  1446 10/23/20  0511   * 134*  --   --  135*   K 3.0* 2.7* 3.3* 3.6 4.4   ANIONGAP 13 14  --   --  12   CL 92* 98  --   --  96*   CO2 25 22  --   --  27   BUN 28* 29*  --   --  24*   CREATININE 3.3* 3.7*  --   --  3.3*   GLUCOSE 657* 68*  --   --  79   CALCIUM 9.0 8.1*  --   --  8.4*     Recent Labs     10/21/20  1405 10/22/20  0151 10/22/20  1446 10/23/20  0511   MG 2.1 1.7  --  2.1   PHOS 1.5* 2.6 2.5 3.2     Recent Labs     10/21/20  0340   AST 25   ALT 14   BILITOT 0.3   ALKPHOS 119*     ABGs:No results for input(s): PH, PO2, PCO2, HCO3, BE, O2SAT in the last 72 hours. Troponin T: No results for input(s): TROPONINI in the last 72 hours. INR: No results for input(s): INR in the last 72 hours.   Lactic Acid:   Recent Labs     10/21/20  0330   LACTA 1.3       Objective:   Vitals: BP (!) 169/81   Pulse 91   Temp 100.4 °F (38 °C) (Temporal)   Resp 18   Ht 5' 7\" (1.702 m)   Wt 162 lb 1 oz (73.5 kg)   SpO2 94%   BMI 25.38 kg/m²   24HR INTAKE/OUTPUT:      Intake/Output Summary (Last 24 hours) at 10/23/2020 0759  Last for patient assessment, chart review, , documentation, discussion with staff/ other providers. Signed:   Tammie Alexandre MD 10/23/2020 7:59 AM  Hospitalist

## 2020-10-23 NOTE — PROGRESS NOTES
Patient:   Wayne Walker  MR#:    [id number]   Room:    5580/842-04   YOB: 1970  Date of Progress Note: 10/23/2020  Time of Note                           8:03 AM  Consulting Physician:   Sparkle Wolff M.D. Attending Physician: Remi Rowell MD     Chief complaint seizure AMS    S:This 45 y.o. female  with a past medical history significant for uncontrolled diabetes, HTN and ESRD on hemodialysis. The history is obtained from the chart and nursing as the patient is unable to provide a history due to her mental status. The patient was admitted to the hospital with altered mental status with seizure x 2, HTN urgency with blood pressure up to 245 systolic and severely elevated blood sugar> 1000. She has had pervious admissions to the hospital with hyperglycemia and HTN urgency with no significant change in her mental status. On the morning of admission the family went to wake her and they were unable to do so. In the Er she was confused and witnessed to have 2 brief seizures and given ativan and Keppra and transferred to ICU where she continued to be stuporous. No recent illnesses as per nursing. Afebrile with a normal white count of 5. 4. No new issues.       REVIEW OF SYSTEMS:  Constitutional: No fevers No chills  Neck:No stiffness  Respiratory: No shortness of breath  Cardiovascular: No chest pain No palpitations  Gastrointestinal: No abdominal pain    Genitourinary: No Dysuria  Neurological: No headache, no confusion    Past Medical History:      Diagnosis Date    Arthritis     Chronic kidney disease, stage 5, kidney failure (HCC)     Closed fracture of right shoulder girdle, with routine healing, subsequent encounter     DM (diabetes mellitus) type II controlled with renal manifestation ([facility] 06/1993    Gastroparesis     GERD (gastroesophageal reflux disease)     Hemodialysis patient ([facility]     dialysis on tues, thur, and sat at [facility] clinic    Hypertension     Hypothyroid     Nasal congestion     recent    Noncompliance with medications     Palliative care patient 10/08/2019    Restless leg syndrome        Past Surgical History:      Procedure Laterality Date    BREAST SURGERY      infected milk gland removed, 2004    CHOLECYSTECTOMY, LAPAROSCOPIC      2008    COLONOSCOPY Left 9/17/2020    Dr Chip Coffey hepatic flexure-Severe tortuosity with severe spasming, 1 yr recall    DIALYSIS FISTULA CREATION Left 1/25/2019    REVISION LEFT UPPER EXTREMITY AV ACCESS WITH LEFT BRACHIAL ARTERY TO LEFT AXILLARY VEIN WITH  INTERPOSITIONAL ARTEGRAFT   performed by Caitlin Parham MD at [address]  2012    [address] Right 1/25/2019    INSERTION OF RIGHT INTERNAL JUGULAR HEMODIALYSIS CATHETER performed by Caitlin Parham MD at [address] [facility]  08/17/2018    1.  Moderately increased stainable iron identified by special stain in Kupffer cells and occasional hepatocytes. Negative for evidence of significant portal or lobular inflammation. Negative for evidence of significant fibrosis    [place] COLONOSCOPY W/BIOPSY SINGLE/MULTIPLE N/A 10/20/2017    Dr James Pedersen prep-Tubular AP (-) dysplasia x 2--3 yr recall    [place] EGD TRANSORAL BIOPSY SINGLE/MULTIPLE N/A 12/27/2016    Dr [facility] EGD TRANSORAL BIOPSY SINGLE/MULTIPLE N/A 10/20/2017    Dr Del Cid [facility] (-)   [address] VASCULAR SURGERY Left 2016    fistula by Dr Leonard Sylvester at [address]  10/02/2017    [facility]. Left upper fistulograms/venograms, balloon angioplasty cephalic vein arch 25H38 conquest.    VASCULAR SURGERY  08/2018    FISTULOGRAM    VASCULAR SURGERY  10/29/2018    [facility]. Left upper fistulograms/venograms    VASCULAR SURGERY  12/19/2018    SJS. Arch aortogram,left upper arteriograms.  VASCULAR SURGERY  03/06/2019    [facility]. Removal of tunneled dialyis catheter right internal jugular vein.  VASCULAR SURGERY  08/28/2019    [facility]JS. Left upper extremity fistulogram including venography to the superior vena cava. Balloon angioplasty left subclavian vein with 10mm x 40mm conquest balloon. Balloon angioplasty mid/proximal upper arm cephalic vein with 95AY x 40mm conquest balloon. Venograms after balloon angioplasty. Stent left mid/proximal upper arm cephalic vein stenosis fluency 10mm x 60mm self-expanding covered stent. Balloon     VASCULAR SURGERY  08/28/2019    cont angioplasty stent with 10mm x 40mm conquest balloon. Completion venograms left upper extremity.        Medications in Hospital:      Current Facility-Administered Medications:     hydrALAZINE (APRESOLINE) tablet 75 mg, 75 mg, Oral, 3 times per day, Tanmay Edmond MD, 75 mg at 10/23/20 0525    hydrALAZINE (APRESOLINE) tablet 25 mg, 25 mg, Oral, Q4H PRN, Tanmay Edmond MD    nitroGLYCERIN (NITROSTAT) 0.4 MG SL tablet, , , ,     labetalol (NORMODYNE;TRANDATE) 5 MG/ML injection, , , ,     labetalol (NORMODYNE;TRANDATE) injection 10 mg, 10 mg, Intravenous, Once, Verito Woodson MD    nitroGLYCERIN (NITROSTAT) SL tablet 0.4 mg, 0.4 mg, Sublingual, Q5 Min PRN, Verito Woodson MD    niCARdipine (CARDENE) 25 mg in dextrose 5 % 250 mL infusion, 5 mg/hr, Intravenous, Continuous, Verito Woodson MD    metoprolol succinate (TOPROL XL) extended release tablet 100 mg, 100 mg, Oral, Daily, Marisela Heman, DO, 100 mg at 10/22/20 5968    lisinopril (PRINIVIL;ZESTRIL) tablet 20 mg, 20 mg, Oral, BID, Marisela Heman, DO, 20 mg at 10/22/20 2124    NIFEdipine (PROCARDIA XL) extended release tablet 60 mg, 60 mg, Oral, Daily, Marisela Heman, DO, 60 mg at 10/22/20 1210    isosorbide mononitrate (IMDUR) extended release tablet 120 mg, 120 mg, Oral, Daily, Marisela Heman, DO, 120 mg at 10/22/20 1210    glucose (GLUTOSE) 40 % oral gel 15 g, 15 g, Oral, PRN, Marisela Newsome, DO    dextrose 50 % IV solution, 12.5 g, Intravenous, PRN, Marisela Heman, DO, 12.5 g at 10/22/20 2128    glucagon (rDNA) injection 1 mg, 1 mg, Intramuscular, PRN, Blima Brightly, DO    dextrose 5 % solution, 100 mL/hr, Intravenous, PRN, Blima Brightly, DO    albuterol (PROVENTIL) nebulizer solution 2.5 mg, 2.5 mg, Nebulization, Q4H PRN, Blima Brightly, DO    dextrose 50 % IV solution, 12.5 g, Intravenous, PRN, Blima Brightly, DO, 12.5 g at 10/22/20 1225    potassium chloride 10 mEq/100 mL IVPB (Peripheral Line), 10 mEq, Intravenous, PRN, Blima Brightly, DO, Last Rate: 100 mL/hr at 10/21/20 1221, 10 mEq at 10/21/20 1221    magnesium sulfate 1 g in dextrose 5% 100 mL IVPB, 1 g, Intravenous, PRN, Blima Brightly, DO    sodium phosphate 10 mmol in dextrose 5 % 250 mL IVPB, 10 mmol, Intravenous, PRN, Stopped at 10/22/20 0940 **OR** sodium phosphate 15 mmol in dextrose 5 % 250 mL IVPB, 15 mmol, Intravenous, PRN **OR** sodium phosphate 20 mmol in dextrose 5 % 500 mL IVPB, 20 mmol, Intravenous, PRN, Blima Brightly, DO, Stopped at 10/21/20 2147    heparin (porcine) injection 5,000 Units, 5,000 Units, Subcutaneous, Q8H, Blima Brightly, DO, 5,000 Units at 10/22/20 2129    levetiracetam (KEPPRA) 500 mg/100 mL IVPB, 500 mg, Intravenous, Q24H, Blima Brightly, DO, Stopped at 10/23/20 0539    insulin glargine (LANTUS) injection vial 30 Units, 30 Units, Subcutaneous, BID, Blima Brightly, DO, 30 Units at 10/21/20 2003    insulin lispro (HUMALOG) injection vial 0-18 Units, 0-18 Units, Subcutaneous, TID WC, Blima Brightly, DO, 18 Units at 10/21/20 1712    insulin lispro (HUMALOG) injection vial 0-9 Units, 0-9 Units, Subcutaneous, Nightly, Blima Brightly, DO, 6 Units at 10/21/20 2003    Allergies:  Penicillins; Lamisil advanced [terbinafine]; Stadol [butorphanol]; and Reglan [metoclopramide]    Social History:   TOBACCO:   reports that she has been smoking cigarettes. She has a 66.00 pack-year smoking history. She has never used smokeless tobacco.  ETOH:   reports no history of alcohol use.     Family History:       Problem Relation Age of Onset    Colon Cancer Mother          at 63    Colon Polyps Mother     Lung Cancer Father          at 66    Colon Polyps Father     Stomach Cancer Sister     Breast Cancer Sister     Depression Daughter 25        committed suicide    Liver Cancer Neg Hx     Liver Disease Neg Hx     Esophageal Cancer Neg Hx     Rectal Cancer Neg Hx          PHYSICAL EXAM:  BP (!) 169/81   Pulse 91   Temp 100.4 °F (38 °C) (Temporal)   Resp 18   Ht 5' 7\" (1.702 m)   Wt 162 lb 1 oz (73.5 kg)   SpO2 94%   BMI 25.38 kg/m²     Constitutional - well developed, well nourished. Eyes - conjunctiva normal.   Ear, nose, throat - No scars, masses, or lesions over external nose or ears, no atrophy of tongue  Neck-symmetric, no masses noted, no jugular vein distension  Respiration- chest wall appears symmetric, good expansion,   normal effort without use of accessory muscles  Musculoskeletal - no significant wasting of muscles noted, no bony deformities  Extremities-no clubbing, cyanosis or edema  Skin - warm, dry, and intact. No rash, erythema, or pallor. Psychiatric - mood, affect, and behavior appear normal.      Neurological exam  Awake, alert, fluent oriented x 3 appropriate affect  Attention and concentration appear appropriate  Recent and remote memory appears unremarkable  Speech normal without dysarthria  No clear issues with language of fund of knowledge     Cranial Nerve Exam     CN III, IV,VI-EOMI, No nystagmus, conjugate eye movements, no ptosis    CN VII-no facial assymetry       Motor Exam  antigravity throughout upper and lower extremities bilaterally      Tremors- no tremors in hands or head noted     Gait  Not tested      Nursing/pcp notes, imaging,labs and vitals reviewed.      PT,OT and/or speech notes reviewed    Lab Results   Component Value Date    WBC 7.2 10/23/2020    HGB 9.0 (L) 10/23/2020    HCT 26.3 (L) 10/23/2020    MCV 93.9 10/23/2020     (L)

## 2020-10-23 NOTE — PROGRESS NOTES
Physician Progress Note      Fabrice Edmond  TRAVIS #:                  [id number]  :                       1970  ADMIT DATE:       10/21/2020 3:22 AM  DISCH DATE:  RESPONDING  PROVIDER #:        Stephen Herron [facility]         QUERY TEXT:    Type of Encephalopathy: Please provide further specificity, if known. Options provided:  -- Anoxic/hypoxic encephalopathy  -- Metabolic encephalopathy  -- Toxic encephalopathy  -- Hepatic encephalopathy  -- Hypertensive encephalopathy        PROVIDER RESPONSE TEXT:    The patient has metabolic encephalopathy.       Electronically signed by:  Stephen Herron [facility] 10/23/2020 10:39 AM

## 2020-10-23 NOTE — PROGRESS NOTES
Nephrology ([address] [facility]) Progress Note    Patient:  Tyler Almonte  YOB: 1970  Date of Service: 10/23/2020  MRN: [id number]   Acct: [de-identified]   Primary Care Physician: ROMINA Bustos  Advance Directive: Full Code  Admit Date: 10/21/2020       Hospital Day: 2  Referring Provider: Tre Cantor MD    Patient Seen, Chart, Consults notes, Labs, Radiology studies reviewed. Subjective:  Patient is a 53 y. o. woman who presents to the ER with altered mental status. Bridget Calderón blood sugar was over thousand.  She has end-stage renal disease patient and receives maintenance dialysis (TTS) and is s/p dialysis on 10/20.  At the emergency department, she had 2 seizures. Bridget Calderón blood pressure is 023 systolic and she was moved to [facility] ICU. Lara Cano was started on Cardene drip and nephrology was consulted to manage her ESRD. She is oliguric. Patient has then responded to IV insulin. She was downgraded to  medical floor but this AM she started having chest pain and her BPs were very elevated. She was transferred to CCU and was started on IV Cardene. Her chest xray is in favor for pulmonary edema. Allergies:  Penicillins;  Lamisil advanced [terbinafine]; Stadol [butorphanol]; and Reglan [metoclopramide]    Medicines:  Current Facility-Administered Medications   Medication Dose Route Frequency Provider Last Rate Last Dose    hydrALAZINE (APRESOLINE) tablet 75 mg  75 mg Oral 3 times per day Krista Bravo MD   75 mg at 10/23/20 0525    hydrALAZINE (APRESOLINE) tablet 25 mg  25 mg Oral Q4H PRN Krista Bravo MD        nitroGLYCERIN (NITROSTAT) 0.4 MG SL tablet             labetalol (NORMODYNE;TRANDATE) 5 MG/ML injection             labetalol (NORMODYNE;TRANDATE) injection 10 mg  10 mg Intravenous Once Tre Cantor MD        nitroGLYCERIN (NITROSTAT) SL tablet 0.4 mg  0.4 mg Sublingual Q5 Min PRN Tre Cantor MD        niCARdipine (CARDENE) 25 mg in dextrose 5 % 250 mL infusion  5 mg/hr Intravenous Continuous Shimon Martínez MD 50 mL/hr at 10/23/20 1002 5 mg/hr at 10/23/20 1002    [START ON 10/24/2020] levETIRAcetam (KEPPRA) tablet 500 mg  500 mg Oral Daily Donita Lomeli MD        metoprolol succinate (TOPROL XL) extended release tablet 100 mg  100 mg Oral Daily Billye Banks, DO   100 mg at 10/23/20 1023    lisinopril (PRINIVIL;ZESTRIL) tablet 20 mg  20 mg Oral BID Billye Banks, DO   20 mg at 10/23/20 1023    NIFEdipine (PROCARDIA XL) extended release tablet 60 mg  60 mg Oral Daily Billye Banks, DO   60 mg at 10/23/20 1023    isosorbide mononitrate (IMDUR) extended release tablet 120 mg  120 mg Oral Daily Billye Banks, DO   120 mg at 10/23/20 1022    glucose (GLUTOSE) 40 % oral gel 15 g  15 g Oral PRN Billye Banks, DO        dextrose 50 % IV solution  12.5 g Intravenous PRN Billye Banks, DO   12.5 g at 10/22/20 2128    glucagon (rDNA) injection 1 mg  1 mg Intramuscular PRN Billye Banks, DO        dextrose 5 % solution  100 mL/hr Intravenous PRN Billye Iglesias, DO        albuterol (PROVENTIL) nebulizer solution 2.5 mg  2.5 mg Nebulization Q4H PRN Billye Iglesias, DO        dextrose 50 % IV solution  12.5 g Intravenous PRN Billye Banks, DO   12.5 g at 10/22/20 1225    potassium chloride 10 mEq/100 mL IVPB (Peripheral Line)  10 mEq Intravenous PRN Billye Iglesias,  mL/hr at 10/21/20 1221 10 mEq at 10/21/20 1221    magnesium sulfate 1 g in dextrose 5% 100 mL IVPB  1 g Intravenous PRN Billye Iglesias, DO        sodium phosphate 10 mmol in dextrose 5 % 250 mL IVPB  10 mmol Intravenous PRN Billye Iglesias, DO   Stopped at 10/22/20 0175    Or    sodium phosphate 15 mmol in dextrose 5 % 250 mL IVPB  15 mmol Intravenous PRN Billye Iglesias, DO        Or    sodium phosphate 20 mmol in dextrose 5 % 500 mL IVPB  20 mmol Intravenous PRN Billye Iglesias, DO   Stopped at 10/21/20 2147    heparin (porcine) injection 5,000 Units  5,000 Units Subcutaneous Q8H Gabby Forth, DO   5,000 Units at 10/23/20 1024    insulin lispro (HUMALOG) injection vial 0-18 Units  0-18 Units Subcutaneous TID WC Gabby Forth, DO   18 Units at 10/21/20 1712    insulin lispro (HUMALOG) injection vial 0-9 Units  0-9 Units Subcutaneous Nightly Gabby Forth, DO   6 Units at 10/21/20 2003       Past Medical History:  Past Medical History:   Diagnosis Date    Arthritis     Chronic kidney disease, stage 5, kidney failure (HCC)     Closed fracture of right shoulder girdle, with routine healing, subsequent encounter     DM (diabetes mellitus) type II controlled with renal manifestation ([facility] 06/1993    Gastroparesis     GERD (gastroesophageal reflux disease)     Hemodialysis patient ([facility]     dialysis on tues, thur, and [facility]    Hypertension     Hypothyroid     Nasal congestion     recent    Noncompliance with medications     Palliative care patient 10/08/2019    Restless leg syndrome        Past Surgical History:  Past Surgical History:   Procedure Laterality Date    BREAST SURGERY      infected milk gland removed, 2004    CHOLECYSTECTOMY, LAPAROSCOPIC      2008    COLONOSCOPY Left 9/17/2020    Dr Doretha Alcocer hepatic flexure-Severe tortuosity with severe spasming, 1 yr recall    DIALYSIS FISTULA CREATION Left 1/25/2019    REVISION LEFT UPPER EXTREMITY AV ACCESS WITH LEFT BRACHIAL ARTERY TO LEFT AXILLARY VEIN WITH  INTERPOSITIONAL ARTEGRAFT   performed by Kate Goss MD at [address]  [address]    [address] Right 1/25/2019    INSERTION OF RIGHT INTERNAL JUGULAR HEMODIALYSIS CATHETER performed by Kate Goss MD at [facility]  08/17/2018    1.  Moderately increased stainable iron identified by special stain in Kupffer cells and occasional hepatocytes. Negative for evidence of significant portal or lobular inflammation.  Negative for evidence of significant fibrosis    [place] COLONOSCOPY W/BIOPSY SINGLE/MULTIPLE N/A 10/20/2017    Dr Paige Francois prep-Tubular AP (-) dysplasia x 2--3 yr recall    [place] EGD TRANSORAL BIOPSY SINGLE/MULTIPLE N/A 2016    Dr Genevieve Garcia EGD TRANSORAL BIOPSY SINGLE/MULTIPLE N/A 10/20/2017    Dr Leon Gagnon (-)    TUBAL LIGATION          VASCULAR SURGERY Left 2016    fistula by Dr Tino Shahid at [address]  10/02/2017    [facility]. Left upper fistulograms/venograms, balloon angioplasty cephalic vein arch 30L99 conquest.    VASCULAR SURGERY  2018    FISTULOGRAM    VASCULAR SURGERY  10/29/2018    SJS. Left upper fistulograms/venograms    VASCULAR SURGERY  2018    [facility]. Arch aortogram,left upper arteriograms.  VASCULAR SURGERY  2019    SJS. Removal of tunneled dialyis catheter right internal jugular vein.  VASCULAR SURGERY  2019    [facility]. Left upper extremity fistulogram including venography to the superior vena cava. Balloon angioplasty left subclavian vein with 10mm x 40mm conquest balloon. Balloon angioplasty mid/proximal upper arm cephalic vein with 71AP x 40mm conquest balloon. Venograms after balloon angioplasty. Stent left mid/proximal upper arm cephalic vein stenosis fluency 10mm x 60mm self-expanding covered stent. Balloon     VASCULAR SURGERY  2019    cont angioplasty stent with 10mm x 40mm conquest balloon. Completion venograms left upper extremity.        Family History  Family History   Problem Relation Age of Onset    Colon Cancer Mother          at 63    Colon Polyps Mother    Thornell Leola Father          at 79    Colon Polyps Father     Stomach Cancer Sister     Breast Cancer Sister     Depression Daughter 25        committed suicide    Liver Cancer Neg Hx     Liver Disease Neg Hx     Esophageal Cancer Neg Hx     Rectal Cancer Neg Hx        Social History  Social History     Socioeconomic History    Marital status: Single     Spouse name: Not on file    Number of children: 2    Years of education: Not on file    Highest education level: Not on file   Occupational History    Not on file   Social Needs    Financial resource strain: Not on file    Food insecurity     Worry: Not on file     Inability: Not on file    Transportation needs     Medical: Not on file     Non-medical: Not on file   Tobacco Use    Smoking status: Current Every Day Smoker     Packs/day: 2.00     Years: 33.00     Pack years: 66.00     Types: Cigarettes    Smokeless tobacco: Never Used    Tobacco comment: NOW at 1/4 PD, started at age 24 and has averaged 2 PPD   Substance and Sexual Activity    Alcohol use: No    Drug use: Not Currently     Types: Marijuana    Sexual activity: Not Currently     Partners: Male   Lifestyle    Physical activity     Days per week: Not on file     Minutes per session: Not on file    Stress: Not on file   Relationships    Social connections     Talks on phone: Not on file     Gets together: Not on file     Attends Mosque service: Not on file     Active member of club or organization: Not on file     Attends meetings of clubs or organizations: Not on file     Relationship status: Not on file    Intimate partner violence     Fear of current or ex partner: Not on file     Emotionally abused: Not on file     Physically abused: Not on file     Forced sexual activity: Not on file   Other Topics Concern    Not on file   Social History Narrative    Not on file         Review of Systems:  History obtained from chart review and the patient  General ROS: No fever or chills  Respiratory ROS: No cough, +shortness of breath, -wheezing  Cardiovascular ROS: no chest pain but dyspnea on exertion  Gastrointestinal ROS: No abdominal pain or melena  Genito-Urinary ROS: No dysuria or hematuria  Musculoskeletal ROS: No joint pain or swelling         Objective:  Blood pressure (!) 190/93, pulse 89, temperature 100.4 °F (38 °C), temperature source Temporal, resp. rate 18, height 5' 7\" (1.702 m), weight 162 lb 1 oz (73.5 kg), SpO2 94 %. Intake/Output Summary (Last 24 hours) at 10/23/2020 1121  Last data filed at 10/23/2020 0930  Gross per 24 hour   Intake 440 ml   Output 2600 ml   Net -2160 ml     General: alert and oriented x3   Chest: bilateral wheezes  CVS: regular rate and rhythm  Abdominal: soft, nontender, normal bowel sounds  Extremities: no cyanosis or edema  Skin: warm and dry without rash    Labs:  BMP:   Recent Labs     10/22/20  0151  10/22/20  1446 10/23/20  0511 10/23/20  0742   *  --   --  135* 133*   K 2.7*   < > 3.6 4.4 4.5   CL 98  --   --  96* 96*   CO2 22  --   --  27 28   PHOS 2.6  --  2.5 3.2  --    BUN 29*  --   --  24* 25*   CREATININE 3.7*  --   --  3.3* 3.6*   CALCIUM 8.1*  --   --  8.4* 8.6    < > = values in this interval not displayed. CBC:   Recent Labs     10/21/20  0330 10/22/20  0151 10/23/20  0511   WBC 5.4 8.6 7.2   HGB 9.7* 10.6* 9.0*   HCT 28.5* 29.8* 26.3*   MCV 95.3 89.8 93.9    122* 106*     LIVER PROFILE:   Recent Labs     10/21/20  0340   AST 25   ALT 14   BILITOT 0.3   ALKPHOS 119*     PT/INR: No results for input(s): PROTIME, INR in the last 72 hours. APTT: No results for input(s): APTT in the last 72 hours. BNP:  No results for input(s): BNP in the last 72 hours. Ionized Calcium:No results for input(s): IONCA in the last 72 hours. Magnesium:  Recent Labs     10/22/20  0151 10/23/20  0511 10/23/20  0742   MG 1.7 2.1 2.1     Phosphorus:  Recent Labs     10/22/20  0151 10/22/20  1446 10/23/20  0511   PHOS 2.6 2.5 3.2     HgbA1C: No results for input(s): LABA1C in the last 72 hours. Hepatic:   Recent Labs     10/21/20  0340   ALKPHOS 119*   ALT 14   AST 25   PROT 7.1   BILITOT 0.3   LABALBU 4.2     Lactic Acid:   Recent Labs     10/21/20  0330   LACTA 1.3     Troponin: No results for input(s): CKTOTAL, CKMB, TROPONINT in the last 72 hours.   ABGs: No results for input(s): PH, PCO2, PO2, HCO3, O2SAT in the last

## 2020-10-23 NOTE — PLAN OF CARE
resources and services will improve  Outcome: Ongoing  Goal: Identification of resources available to assist in meeting health care needs will improve  Description: Identification of resources available to assist in meeting health care needs will improve  Outcome: Ongoing     Problem: Nutritional:  Goal: Ability to identify appropriate dietary choices will improve  Description: Ability to identify appropriate dietary choices will improve  Outcome: Ongoing  Goal: Maintenance of adequate nutrition will improve  Description: Maintenance of adequate nutrition will improve  Outcome: Ongoing  Goal: Progress toward achieving an optimal weight will improve  Description: Progress toward achieving an optimal weight will improve  Outcome: Ongoing  Goal: Ability to identify appropriate dietary choices will improve  Description: Ability to identify appropriate dietary choices will improve  Outcome: Ongoing  Goal: Nutritional status will improve  Description: Nutritional status will improve  Outcome: Ongoing     Problem: Physical Regulation:  Goal: Complications related to the disease process, condition or treatment will be avoided or minimized  Description: Complications related to the disease process, condition or treatment will be avoided or minimized  Outcome: Ongoing  Goal: Ability to maintain clinical measurements within normal limits will improve  Description: Ability to maintain clinical measurements within normal limits will improve  Outcome: Ongoing  Goal: Will show no signs and symptoms of electrolyte imbalance  Description: Will show no signs and symptoms of electrolyte imbalance  Outcome: Ongoing  Goal: Diagnostic test results will improve  Description: Diagnostic test results will improve  Outcome: Ongoing  Goal: Will remain free from infection  Description: Will remain free from infection  Outcome: Ongoing  Goal: Ability to maintain vital signs within normal range will improve  Description: Ability to maintain vital signs within normal range will improve  Outcome: Ongoing     Problem: Sensory:  Goal: General experience of comfort will improve  Description: General experience of comfort will improve  Outcome: Ongoing     Problem: Tissue Perfusion - Renal, Altered:  Goal: Electrolytes within specified parameters  Description: Electrolytes within specified parameters  Outcome: Ongoing  Goal: Urine creatinine clearance will be within specified parameters  Description: Urine creatinine clearance will be within specified parameters  Outcome: Ongoing  Goal: Serum creatinine will be within specified parameters  Description: Serum creatinine will be within specified parameters  Outcome: Ongoing     Problem: Metabolic:  Goal: Ability to maintain appropriate glucose levels will improve  Description: Ability to maintain appropriate glucose levels will improve  Outcome: Ongoing     Problem: Tissue Perfusion:  Goal: Adequacy of tissue perfusion will improve  Description: Adequacy of tissue perfusion will improve  Outcome: Ongoing     Problem: Discharge Planning:  Goal: Discharged to appropriate level of care  Description: Discharged to appropriate level of care  Outcome: Ongoing     Problem: Serum Glucose Level - Abnormal:  Goal: Ability to maintain appropriate glucose levels will improve  Description: Ability to maintain appropriate glucose levels will improve  Outcome: Ongoing     Problem: Sensory Perception - Impaired:  Goal: Ability to maintain a stable neurologic state will improve  Description: Ability to maintain a stable neurologic state will improve  Outcome: Ongoing     Problem: Respiratory:  Goal: Ability to maintain normal respiratory secretions will improve  Description: Ability to maintain normal respiratory secretions will improve  Outcome: Ongoing     Problem: Bleeding:  Goal: Will show no signs and symptoms of excessive bleeding  Description: Will show no signs and symptoms of excessive bleeding  Outcome: Ongoing

## 2020-10-23 NOTE — PROGRESS NOTES
Patient called out to go to the bathroom. When entered room, patient sat up in bed and c/o dizziness and requested bedpan. Put pt on bedpan and pt c/o difficulty breathing. Got vitals and o2 sat was 77%, placed on 5L o2 nasal cannula and sat quickly improved to 91% but pt still c/o difficulty breathing. Pt was also hypertensive and had a fever. Upon further questioning, patient stated her chest was hurting and had been for about 2 hours. A rapid response chest pain was called.

## 2020-10-24 ENCOUNTER — APPOINTMENT (OUTPATIENT)
Dept: GENERAL RADIOLOGY | Age: 50
DRG: 637 | End: 2020-10-24
Payer: MEDICARE

## 2020-10-24 LAB
ANION GAP SERPL CALCULATED.3IONS-SCNC: 9 MMOL/L (ref 7–19)
BASOPHILS ABSOLUTE: 0.1 K/UL (ref 0–0.2)
BASOPHILS RELATIVE PERCENT: 1.4 % (ref 0–1)
BUN BLDV-MCNC: 28 MG/DL (ref 6–20)
CALCIUM SERPL-MCNC: 8.6 MG/DL (ref 8.6–10)
CHLORIDE BLD-SCNC: 94 MMOL/L (ref 98–111)
CO2: 29 MMOL/L (ref 22–29)
CREAT SERPL-MCNC: 3.1 MG/DL (ref 0.5–0.9)
EOSINOPHILS ABSOLUTE: 0.2 K/UL (ref 0–0.6)
EOSINOPHILS RELATIVE PERCENT: 3.1 % (ref 0–5)
GFR AFRICAN AMERICAN: 19
GFR NON-AFRICAN AMERICAN: 16
GLUCOSE BLD-MCNC: 145 MG/DL (ref 70–99)
GLUCOSE BLD-MCNC: 216 MG/DL (ref 70–99)
GLUCOSE BLD-MCNC: 322 MG/DL (ref 74–109)
GLUCOSE BLD-MCNC: 335 MG/DL (ref 70–99)
GLUCOSE BLD-MCNC: 338 MG/DL (ref 70–99)
GLUCOSE BLD-MCNC: 344 MG/DL (ref 70–99)
HCT VFR BLD CALC: 25.2 % (ref 37–47)
HEMOGLOBIN: 8.2 G/DL (ref 12–16)
IMMATURE GRANULOCYTES #: 0 K/UL
LYMPHOCYTES ABSOLUTE: 1 K/UL (ref 1.1–4.5)
LYMPHOCYTES RELATIVE PERCENT: 16 % (ref 20–40)
MAGNESIUM: 2.3 MG/DL (ref 1.6–2.6)
MCH RBC QN AUTO: 31.5 PG (ref 27–31)
MCHC RBC AUTO-ENTMCNC: 32.5 G/DL (ref 33–37)
MCV RBC AUTO: 96.9 FL (ref 81–99)
MONOCYTES ABSOLUTE: 0.4 K/UL (ref 0–0.9)
MONOCYTES RELATIVE PERCENT: 6.8 % (ref 0–10)
NEUTROPHILS ABSOLUTE: 4.7 K/UL (ref 1.5–7.5)
NEUTROPHILS RELATIVE PERCENT: 72.2 % (ref 50–65)
PDW BLD-RTO: 13.6 % (ref 11.5–14.5)
PERFORMED ON: ABNORMAL
PHOSPHORUS: 3.1 MG/DL (ref 2.5–4.5)
PLATELET # BLD: 94 K/UL (ref 130–400)
PMV BLD AUTO: 10.9 FL (ref 9.4–12.3)
POTASSIUM SERPL-SCNC: 4.7 MMOL/L (ref 3.5–5)
PROCALCITONIN: 0.39 NG/ML (ref 0–0.09)
RBC # BLD: 2.6 M/UL (ref 4.2–5.4)
SODIUM BLD-SCNC: 132 MMOL/L (ref 136–145)
WBC # BLD: 6.5 K/UL (ref 4.8–10.8)

## 2020-10-24 PROCEDURE — 6360000002 HC RX W HCPCS: Performed by: INTERNAL MEDICINE

## 2020-10-24 PROCEDURE — 2500000003 HC RX 250 WO HCPCS: Performed by: INTERNAL MEDICINE

## 2020-10-24 PROCEDURE — 8010000000 HC HEMODIALYSIS ACUTE INPT

## 2020-10-24 PROCEDURE — 1210000000 HC MED SURG R&B

## 2020-10-24 PROCEDURE — 6370000000 HC RX 637 (ALT 250 FOR IP): Performed by: INTERNAL MEDICINE

## 2020-10-24 PROCEDURE — 84145 PROCALCITONIN (PCT): CPT

## 2020-10-24 PROCEDURE — 6370000000 HC RX 637 (ALT 250 FOR IP): Performed by: PSYCHIATRY & NEUROLOGY

## 2020-10-24 PROCEDURE — 71045 X-RAY EXAM CHEST 1 VIEW: CPT

## 2020-10-24 PROCEDURE — 82947 ASSAY GLUCOSE BLOOD QUANT: CPT

## 2020-10-24 PROCEDURE — 83735 ASSAY OF MAGNESIUM: CPT

## 2020-10-24 PROCEDURE — 85025 COMPLETE CBC W/AUTO DIFF WBC: CPT

## 2020-10-24 PROCEDURE — 6360000002 HC RX W HCPCS: Performed by: HOSPITALIST

## 2020-10-24 PROCEDURE — 80048 BASIC METABOLIC PNL TOTAL CA: CPT

## 2020-10-24 PROCEDURE — 36514 APHERESIS PLASMA: CPT

## 2020-10-24 PROCEDURE — 84100 ASSAY OF PHOSPHORUS: CPT

## 2020-10-24 PROCEDURE — 2700000000 HC OXYGEN THERAPY PER DAY

## 2020-10-24 PROCEDURE — 6370000000 HC RX 637 (ALT 250 FOR IP): Performed by: HOSPITALIST

## 2020-10-24 PROCEDURE — 2580000003 HC RX 258: Performed by: INTERNAL MEDICINE

## 2020-10-24 PROCEDURE — 36415 COLL VENOUS BLD VENIPUNCTURE: CPT

## 2020-10-24 RX ORDER — CLONIDINE HYDROCHLORIDE 0.1 MG/1
0.1 TABLET ORAL 3 TIMES DAILY
Status: DISCONTINUED | OUTPATIENT
Start: 2020-10-24 | End: 2020-10-24

## 2020-10-24 RX ORDER — LABETALOL HYDROCHLORIDE 5 MG/ML
20 INJECTION, SOLUTION INTRAVENOUS EVERY 4 HOURS PRN
Status: DISCONTINUED | OUTPATIENT
Start: 2020-10-24 | End: 2020-10-26 | Stop reason: HOSPADM

## 2020-10-24 RX ORDER — LABETALOL HYDROCHLORIDE 5 MG/ML
5 INJECTION, SOLUTION INTRAVENOUS EVERY 4 HOURS PRN
Status: DISCONTINUED | OUTPATIENT
Start: 2020-10-24 | End: 2020-10-24

## 2020-10-24 RX ORDER — ENALAPRILAT 2.5 MG/2ML
1.25 INJECTION INTRAVENOUS EVERY 6 HOURS PRN
Status: DISCONTINUED | OUTPATIENT
Start: 2020-10-24 | End: 2020-10-26 | Stop reason: HOSPADM

## 2020-10-24 RX ORDER — LEVOFLOXACIN 5 MG/ML
500 INJECTION, SOLUTION INTRAVENOUS
Status: DISCONTINUED | OUTPATIENT
Start: 2020-10-26 | End: 2020-10-26 | Stop reason: HOSPADM

## 2020-10-24 RX ORDER — CLONIDINE 0.3 MG/24H
1 PATCH, EXTENDED RELEASE TRANSDERMAL WEEKLY
Status: DISCONTINUED | OUTPATIENT
Start: 2020-10-24 | End: 2020-10-26 | Stop reason: HOSPADM

## 2020-10-24 RX ORDER — LABETALOL HYDROCHLORIDE 5 MG/ML
10 INJECTION, SOLUTION INTRAVENOUS EVERY 4 HOURS PRN
Status: DISCONTINUED | OUTPATIENT
Start: 2020-10-24 | End: 2020-10-24

## 2020-10-24 RX ORDER — HYDRALAZINE HYDROCHLORIDE 20 MG/ML
10 INJECTION INTRAMUSCULAR; INTRAVENOUS EVERY 4 HOURS PRN
Status: DISCONTINUED | OUTPATIENT
Start: 2020-10-24 | End: 2020-10-24

## 2020-10-24 RX ORDER — LEVOFLOXACIN 5 MG/ML
750 INJECTION, SOLUTION INTRAVENOUS ONCE
Status: COMPLETED | OUTPATIENT
Start: 2020-10-24 | End: 2020-10-24

## 2020-10-24 RX ORDER — CLONIDINE 0.3 MG/24H
1 PATCH, EXTENDED RELEASE TRANSDERMAL WEEKLY
Status: DISCONTINUED | OUTPATIENT
Start: 2020-10-24 | End: 2020-10-24

## 2020-10-24 RX ORDER — INSULIN GLARGINE 100 [IU]/ML
20 INJECTION, SOLUTION SUBCUTANEOUS 2 TIMES DAILY
Status: DISCONTINUED | OUTPATIENT
Start: 2020-10-24 | End: 2020-10-25

## 2020-10-24 RX ADMIN — ENALAPRILAT 1.25 MG: 1.25 INJECTION INTRAVENOUS at 12:18

## 2020-10-24 RX ADMIN — ENALAPRILAT 1.25 MG: 1.25 INJECTION INTRAVENOUS at 22:09

## 2020-10-24 RX ADMIN — INSULIN LISPRO 6 UNITS: 100 INJECTION, SOLUTION INTRAVENOUS; SUBCUTANEOUS at 12:28

## 2020-10-24 RX ADMIN — ONDANSETRON 4 MG: 2 INJECTION INTRAMUSCULAR; INTRAVENOUS at 01:12

## 2020-10-24 RX ADMIN — HYDRALAZINE HYDROCHLORIDE 75 MG: 25 TABLET, FILM COATED ORAL at 05:56

## 2020-10-24 RX ADMIN — HEPARIN SODIUM 5000 UNITS: 5000 INJECTION INTRAVENOUS; SUBCUTANEOUS at 15:10

## 2020-10-24 RX ADMIN — HYDRALAZINE HYDROCHLORIDE 75 MG: 25 TABLET, FILM COATED ORAL at 22:09

## 2020-10-24 RX ADMIN — Medication 20 MG: at 17:59

## 2020-10-24 RX ADMIN — CLONIDINE HYDROCHLORIDE 0.1 MG: 0.1 TABLET ORAL at 04:08

## 2020-10-24 RX ADMIN — ONDANSETRON 4 MG: 2 INJECTION INTRAMUSCULAR; INTRAVENOUS at 12:18

## 2020-10-24 RX ADMIN — HYDRALAZINE HYDROCHLORIDE 75 MG: 25 TABLET, FILM COATED ORAL at 15:10

## 2020-10-24 RX ADMIN — NIFEDIPINE 60 MG: 60 TABLET, FILM COATED, EXTENDED RELEASE ORAL at 09:29

## 2020-10-24 RX ADMIN — LEVETIRACETAM 500 MG: 500 TABLET ORAL at 07:08

## 2020-10-24 RX ADMIN — INSULIN LISPRO 12 UNITS: 100 INJECTION, SOLUTION INTRAVENOUS; SUBCUTANEOUS at 17:54

## 2020-10-24 RX ADMIN — HYDRALAZINE HYDROCHLORIDE 5 MG: 20 INJECTION INTRAMUSCULAR; INTRAVENOUS at 03:12

## 2020-10-24 RX ADMIN — HEPARIN SODIUM 5000 UNITS: 5000 INJECTION INTRAVENOUS; SUBCUTANEOUS at 22:09

## 2020-10-24 RX ADMIN — INSULIN GLARGINE 20 UNITS: 100 INJECTION, SOLUTION SUBCUTANEOUS at 12:40

## 2020-10-24 RX ADMIN — ISOSORBIDE MONONITRATE 120 MG: 60 TABLET, EXTENDED RELEASE ORAL at 09:31

## 2020-10-24 RX ADMIN — METOPROLOL SUCCINATE 100 MG: 50 TABLET, EXTENDED RELEASE ORAL at 09:31

## 2020-10-24 RX ADMIN — LISINOPRIL 20 MG: 20 TABLET ORAL at 09:31

## 2020-10-24 RX ADMIN — INSULIN LISPRO 2 UNITS: 100 INJECTION, SOLUTION INTRAVENOUS; SUBCUTANEOUS at 23:22

## 2020-10-24 RX ADMIN — LISINOPRIL 20 MG: 20 TABLET ORAL at 22:36

## 2020-10-24 RX ADMIN — HEPARIN SODIUM 5000 UNITS: 5000 INJECTION INTRAVENOUS; SUBCUTANEOUS at 01:12

## 2020-10-24 RX ADMIN — VANCOMYCIN HYDROCHLORIDE 1000 MG: 10 INJECTION, POWDER, LYOPHILIZED, FOR SOLUTION INTRAVENOUS at 15:10

## 2020-10-24 RX ADMIN — INSULIN GLARGINE 20 UNITS: 100 INJECTION, SOLUTION SUBCUTANEOUS at 23:22

## 2020-10-24 RX ADMIN — LEVOFLOXACIN 750 MG: 5 INJECTION, SOLUTION INTRAVENOUS at 12:27

## 2020-10-24 NOTE — PLAN OF CARE
Problem: Falls - Risk of:  Goal: Will remain free from falls  Description: Will remain free from falls  Outcome: Ongoing  Goal: Absence of physical injury  Description: Absence of physical injury  Outcome: Ongoing     Problem: Cardiac:  Goal: Ability to maintain an adequate cardiac output will improve  Description: Ability to maintain an adequate cardiac output will improve  Outcome: Ongoing  Goal: Hemodynamic stability will improve  Description: Hemodynamic stability will improve  Outcome: Ongoing  Goal: Complications related to the disease process, condition or treatment will be avoided or minimized  Description: Complications related to the disease process, condition or treatment will be avoided or minimized  Outcome: Ongoing  Goal: Cerebral tissue perfusion will improve  Outcome: Ongoing     Problem: Breathing Pattern - Ineffective:  Goal: Ability to achieve and maintain a regular respiratory rate will improve  Description: Ability to achieve and maintain a regular respiratory rate will improve  Outcome: Ongoing     Problem: Skin Integrity:  Goal: Will show no infection signs and symptoms  Description: Will show no infection signs and symptoms  Outcome: Ongoing  Goal: Absence of new skin breakdown  Description: Absence of new skin breakdown  Outcome: Ongoing  Goal: Status of oral mucous membranes will improve  Description: Status of oral mucous membranes will improve  Outcome: Ongoing  Goal: Skin integrity will be maintained  Description: Skin integrity will be maintained  Outcome: Ongoing  Goal: Risk for impaired skin integrity will decrease  Description: Risk for impaired skin integrity will decrease  Outcome: Ongoing  Goal: Demonstration of wound healing without infection will improve  Description: Demonstration of wound healing without infection will improve  Outcome: Ongoing  Goal: Complications related to intravenous access or infusion will be avoided or minimized  Description: Complications related to intravenous access or infusion will be avoided or minimized  Outcome: Ongoing     Problem:  Activity:  Goal: Fatigue will decrease  Description: Fatigue will decrease  Outcome: Ongoing  Goal: Risk for activity intolerance will decrease  Description: Risk for activity intolerance will decrease  Outcome: Ongoing  Goal: Ability to tolerate increased activity will improve  Description: Ability to tolerate increased activity will improve  Outcome: Ongoing     Problem: Coping:  Goal: Ability to cope will improve  Description: Ability to cope will improve  Outcome: Ongoing  Goal: Ability to adjust to condition or change in health will improve  Description: Ability to adjust to condition or change in health will improve  Outcome: Ongoing  Goal: Ability to identify and develop effective coping behavior will improve  Description: Ability to identify and develop effective coping behavior will improve  Outcome: Ongoing     Problem: Fluid Volume:  Goal: Will show no signs or symptoms of fluid imbalance  Description: Will show no signs or symptoms of fluid imbalance  Outcome: Ongoing  Goal: Ability to achieve a balanced intake and output will improve  Description: Ability to achieve a balanced intake and output will improve  Outcome: Ongoing  Goal: Ability to maintain a balanced intake and output will improve  Description: Ability to maintain a balanced intake and output will improve  Outcome: Ongoing     Problem: Health Behavior:  Goal: Ability to manage health-related needs will improve  Description: Ability to manage health-related needs will improve  Outcome: Ongoing  Goal: Identification of resources available to assist in meeting health care needs will improve  Description: Identification of resources available to assist in meeting health care needs will improve  Outcome: Ongoing  Goal: Ability to identify and utilize available resources and services will improve  Description: Ability to identify and utilize available vital signs within normal range will improve  Outcome: Ongoing     Problem: Sensory:  Goal: General experience of comfort will improve  Description: General experience of comfort will improve  Outcome: Ongoing     Problem: Tissue Perfusion - Renal, Altered:  Goal: Electrolytes within specified parameters  Description: Electrolytes within specified parameters  Outcome: Ongoing  Goal: Urine creatinine clearance will be within specified parameters  Description: Urine creatinine clearance will be within specified parameters  Outcome: Ongoing  Goal: Serum creatinine will be within specified parameters  Description: Serum creatinine will be within specified parameters  Outcome: Ongoing     Problem: Metabolic:  Goal: Ability to maintain appropriate glucose levels will improve  Description: Ability to maintain appropriate glucose levels will improve  Outcome: Ongoing     Problem: Tissue Perfusion:  Goal: Adequacy of tissue perfusion will improve  Description: Adequacy of tissue perfusion will improve  Outcome: Ongoing

## 2020-10-24 NOTE — PROGRESS NOTES
Pharmacy Consult      Cuauhtemoc Cortez is a 45 y.o. female for whom pharmacy has been consulted to dose levofloxacin. Patient Active Problem List   Diagnosis    Gastroesophageal reflux disease without esophagitis    Acquired hypothyroidism    Hyperkalemia    Anemia in chronic kidney disease (CKD)    Iron deficiency    Family history of colon cancer    Unintentional weight loss    Type 2 diabetes mellitus with chronic kidney disease on chronic dialysis, with long-term current use of insulin ([address]    Noncompliance of patient with renal dialysis ([address]    Respiratory failure ([address]    Acute respiratory failure with hypoxia and hypercapnia (HCC)    Dialysis AV fistula malfunction (HCC)    Gastroparesis    Nausea and vomiting    Chronic constipation    Uncontrolled type 2 diabetes mellitus with complication, with long-term current use of insulin (HCC)    Abnormal CT of liver    Other ascites    ESRD (end stage renal disease) on dialysis (HCC)    High hepatic iron concentration determined by biopsy of liver    Steal syndrome as complication of dialysis access ([address]    PVD (peripheral vascular disease) (HCC)    ESRD on hemodialysis ([address]    Hyponatremia    Hyperglycemia due to type 2 diabetes mellitus (HCC)    Normocytic anemia    Volume overload    Left homonymous hemianopsia    Acute intractable headache    Accelerated hypertension    Medically noncompliant    Noncompliance with medications    Seizure ([address]    Hyperglycemic hyperosmolar nonketotic coma ([address]    Type 2 diabetes mellitus with hyperosmolar coma, with long-term current use of insulin ([address]    Palliative care patient    Hypertensive emergency    Chronic epigastric pain    Chronic nausea    Chronic vomiting    Abnormal CT of the abdomen    Poor appetite    Personal history of colonic polyps    Hyperosmolar syndrome    DKA, type 2, not at goal [facility])    Hypertensive urgency    Altered mental state       Allergies:  Penicillins;  Lamisil advanced [terbinafine]; Stadol [butorphanol]; and Reglan [metoclopramide]     Recent Labs     10/23/20  0742 10/24/20  0246   CREATININE 3.6* 3.1*       Ht/Wt:   Ht Readings from Last 1 Encounters:   10/21/20 5' 7\" (1.702 m)        Wt Readings from Last 1 Encounters:   10/23/20 162 lb 1 oz (73.5 kg)         Estimated Creatinine Clearance: 21 mL/min (A) (based on SCr of 3.1 mg/dL (H)). Assessment/Plan:    Levofloxacin 750 mg initial then 500 mg iv every 48 hours    Thank you for the consult. Will continue to follow.     Electronically signed by Mike Josue, [address] [facility] [address] on 10/24/2020 at 10:52 AM

## 2020-10-24 NOTE — PROGRESS NOTES
Krzysztof Joshi MD        nitroGLYCERIN (NITROSTAT) SL tablet 0.4 mg  0.4 mg Sublingual Q5 Min PRN Mita Monzon MD        levETIRAcetam (KEPPRA) tablet 500 mg  500 mg Oral Daily Kirsten Hart MD   500 mg at 10/24/20 0708    ondansetron (ZOFRAN) injection 4 mg  4 mg Intravenous Q6H PRN Sonny Bro MD   4 mg at 10/24/20 0112    metoprolol succinate (TOPROL XL) extended release tablet 100 mg  100 mg Oral Daily Mita Monzon MD   100 mg at 10/24/20 0931    lisinopril (PRINIVIL;ZESTRIL) tablet 20 mg  20 mg Oral BID Mita Monzon MD   20 mg at 10/24/20 0931    NIFEdipine (PROCARDIA XL) extended release tablet 60 mg  60 mg Oral Daily Mita Monzon MD   60 mg at 10/24/20 4426    isosorbide mononitrate (IMDUR) extended release tablet 120 mg  120 mg Oral Daily Mita Monzon MD   120 mg at 10/24/20 0931    glucose (GLUTOSE) 40 % oral gel 15 g  15 g Oral PRN Mita Monzon MD        dextrose 50 % IV solution  12.5 g Intravenous PRN Mita Monzon MD   12.5 g at 10/22/20 2128    glucagon (rDNA) injection 1 mg  1 mg Intramuscular PRN Miat Monzon MD        dextrose 5 % solution  100 mL/hr Intravenous PRN Mita Monzon MD        albuterol (PROVENTIL) nebulizer solution 2.5 mg  2.5 mg Nebulization Q4H PRN Mita Monzon MD        dextrose 50 % IV solution  12.5 g Intravenous PRN Mita Monzon MD   12.5 g at 10/22/20 1225    potassium chloride 10 mEq/100 mL IVPB (Peripheral Line)  10 mEq Intravenous PRN Mita Monzon  mL/hr at 10/21/20 1221 10 mEq at 10/21/20 1221    magnesium sulfate 1 g in dextrose 5% 100 mL IVPB  1 g Intravenous PRN Mita Monzon MD        sodium phosphate 10 mmol in dextrose 5 % 250 mL IVPB  10 mmol Intravenous PRN Mita Monzon MD   Stopped at 10/22/20 0940    Or    sodium phosphate 15 mmol in dextrose 5 % 250 mL IVPB  15 mmol Intravenous PRN Mita Monzon MD        Or    sodium phosphate 20 mmol in dextrose 5 % 500 mL IVPB  20 mmol Intravenous PRN Bhavya Love Lois [place] MD   Stopped at 10/21/20 2147    heparin (porcine) injection 5,000 Units  5,000 Units Subcutaneous Q8H Ana Tao MD   5,000 Units at 10/24/20 0112    insulin lispro (HUMALOG) injection vial 0-18 Units  0-18 Units Subcutaneous TID WC Ana Tao MD   3 Units at 10/23/20 1206    insulin lispro (HUMALOG) injection vial 0-9 Units  0-9 Units Subcutaneous Nightly Ana Tao MD   6 Units at 10/23/20 2146       Past Medical History:  Past Medical History:   Diagnosis Date    Arthritis     [facility], stage 5, [address]     Closed fracture of right shoulder girdle, with routine healing, subsequent encounter     DM (diabetes mellitus) type II controlled with renal manifestation ([facility] 06/1993    Gastroparesis     GERD (gastroesophageal reflux disease)     Hemodialysis patient ([facility]     dialysis on tues, thur, and sat at [facility]    Hypertension     Hypothyroid     Nasal congestion     recent    Noncompliance with medications     Palliative care patient 10/08/2019    Restless leg syndrome        Past Surgical History:  Past Surgical History:   Procedure Laterality Date    BREAST SURGERY      infected milk gland removed, 2004    CHOLECYSTECTOMY, LAPAROSCOPIC      2008    COLONOSCOPY Left 9/17/2020    Dr Kim Walton hepatic flexure-Severe tortuosity with severe spasming, 1 yr recall    DIALYSIS FISTULA CREATION Left 1/25/2019    REVISION LEFT UPPER EXTREMITY AV ACCESS WITH LEFT BRACHIAL ARTERY TO LEFT AXILLARY VEIN WITH  INTERPOSITIONAL ARTEGRAFT   performed by Kyle Barrientos MD at [address]  2012    [address] Right 1/25/2019    INSERTION OF RIGHT INTERNAL JUGULAR HEMODIALYSIS CATHETER performed by Kyle Barrientos MD at [facility] [facility]  08/17/2018    1.  Moderately increased stainable iron identified by special stain in Kupffer cells and occasional hepatocytes.  Negative for evidence of significant portal or lobular inflammation. Negative for evidence of significant fibrosis    MT COLONOSCOPY W/BIOPSY SINGLE/MULTIPLE N/A 10/20/2017    Dr Eric García prep-Tubular AP (-) dysplasia x 2--3 yr recall    MT EGD TRANSORAL BIOPSY SINGLE/MULTIPLE N/A 2016    Dr Carmenza Romero EGD TRANSORAL BIOPSY SINGLE/MULTIPLE N/A 10/20/2017    Dr Joseph Rivera (-)   [address] [facility] VASCULAR SURGERY Left 2016    fistula by Dr Chester Mg at [address]  10/02/2017    [facility]. Left upper fistulograms/venograms, balloon angioplasty cephalic vein arch 24V49 conquest.    VASCULAR SURGERY  2018    FISTULOGRAM    VASCULAR SURGERY  10/29/2018    [facility]. Left upper fistulograms/venograms    VASCULAR SURGERY  2018    [facility]. Arch aortogram,left upper arteriograms.  VASCULAR SURGERY  2019    [facility]. Removal of tunneled dialyis catheter right internal jugular vein.  VASCULAR SURGERY  2019    [facility]. Left upper extremity fistulogram including venography to the superior vena cava. Balloon angioplasty left subclavian vein with 10mm x 40mm conquest balloon. Balloon angioplasty mid/proximal upper arm cephalic vein with 68JL x 40mm conquest balloon. Venograms after balloon angioplasty. Stent left mid/proximal upper arm cephalic vein stenosis fluency 10mm x 60mm self-expanding covered stent. Balloon     VASCULAR SURGERY  2019    cont angioplasty stent with 10mm x 40mm conquest balloon. Completion venograms left upper extremity.        Family History  Family History   Problem Relation Age of Onset    Colon Cancer Mother          at 63    Colon Polyps Mother     Lung Cancer Father          at 79    Colon Polyps Father     Stomach Cancer Sister     Breast Cancer Sister     Depression Daughter 25        committed suicide    Liver Cancer Neg Hx     Liver Disease Neg Hx     Esophageal Cancer Neg Hx     Rectal Cancer Neg Hx        Social History  Social History     Socioeconomic History    Marital status: Single     Spouse name: Not on file    Number of children: 2    Years of education: Not on file    Highest education level: Not on file   Occupational History    Not on file   Social Needs    Financial resource strain: Not on file    Food insecurity     Worry: Not on file     Inability: Not on file    Transportation needs     Medical: Not on file     Non-medical: Not on file   Tobacco Use    Smoking status: Current Every Day Smoker     Packs/day: 2.00     Years: 33.00     Pack years: 66.00     Types: Cigarettes    Smokeless tobacco: Never Used    Tobacco comment: NOW at 1/4 PD, started at age 24 and has averaged 2 PPD   Substance and Sexual Activity    Alcohol use: No    Drug use: Not Currently     Types: Marijuana    Sexual activity: Not Currently     Partners: Male   Lifestyle    Physical activity     Days per week: Not on file     Minutes per session: Not on file    Stress: Not on file   Relationships    Social connections     Talks on phone: Not on file     Gets together: Not on file     Attends Latter day service: Not on file     Active member of club or organization: Not on file     Attends meetings of clubs or organizations: Not on file     Relationship status: Not on file    Intimate partner violence     Fear of current or ex partner: Not on file     Emotionally abused: Not on file     Physically abused: Not on file     Forced sexual activity: Not on file   Other Topics Concern    Not on file   Social History Narrative    Not on file         Review of Systems:  Reviewed with the patient at the bedside, 6 points reviewed and negative except as noted above.       Objective:  BP: 238/107   HR: 75  General: awake/alert   Chest:  clear to auscultation bilaterally without respiratory distress  CVS: regular rate and rhythm  Abdominal: soft, nontender, normal bowel sounds  Extremities: no cyanosis or edema  Skin: warm and dry without rash    Labs:  BMP:   Recent Labs     10/22/20  1446 10/23/20  0511 10/23/20  0742 10/24/20  0246   NA  --  135* 133* 132*   K 3.6 4.4 4.5 4.7   CL  --  96* 96* 94*   CO2  --  27 28 29   PHOS 2.5 3.2  --  3.1   BUN  --  24* 25* 28*   CREATININE  --  3.3* 3.6* 3.1*   CALCIUM  --  8.4* 8.6 8.6     CBC:   Recent Labs     10/22/20  0151 10/23/20  0511 10/24/20  0246   WBC 8.6 7.2 6.5   HGB 10.6* 9.0* 8.2*   HCT 29.8* 26.3* 25.2*   MCV 89.8 93.9 96.9   * 106* 94*     LIVER PROFILE:   No results for input(s): AST, ALT, LIPASE, BILIDIR, BILITOT, ALKPHOS in the last 72 hours. Invalid input(s): AMYLASE,  ALB  PT/INR: No results for input(s): PROTIME, INR in the last 72 hours. APTT: No results for input(s): APTT in the last 72 hours. BNP:  No results for input(s): BNP in the last 72 hours. Ionized Calcium:No results for input(s): IONCA in the last 72 hours. Magnesium:  Recent Labs     10/23/20  0511 10/23/20  0742 10/24/20  0246   MG 2.1 2.1 2.3     Phosphorus:  Recent Labs     10/22/20  1446 10/23/20  0511 10/24/20  0246   PHOS 2.5 3.2 3.1     HgbA1C: No results for input(s): LABA1C in the last 72 hours. Hepatic:   No results for input(s): ALKPHOS, ALT, AST, PROT, BILITOT, BILIDIR, LABALBU in the last 72 hours. Lactic Acid:   No results for input(s): LACTA in the last 72 hours. Troponin: No results for input(s): CKTOTAL, CKMB, TROPONINT in the last 72 hours. ABGs: No results for input(s): PH, PCO2, PO2, HCO3, O2SAT in the last 72 hours. CRP:  No results for input(s): CRP in the last 72 hours. Sed Rate:  No results for input(s): SEDRATE in the last 72 hours. Cultures:   No results for input(s): CULTURE in the last 72 hours. No results for input(s): BC, Gevena Elgin in the last 72 hours. No results for input(s): CXSURG in the last 72 hours. Radiology reports as per the Radiologist  Radiology: Ct Head Wo Contrast    Result Date: 10/21/2020  Examination. CT HEAD WO CONTRAST 10/21/2020 3:15 AM History: Altered mental status. DLP: 1723 mGycm.  The CT scan of the head is performed without intravenous contrast enhancement. The images are acquired in axial plane with subsequent reconstruction in coronal and sagittal planes. The comparison is made with the previous study dated 10/8/2019. There are imaging artifacts due to patient motion which lowers the diagnostic yield of the study. There is no evidence of a mass. There is no evidence of intracranial hemorrhage or hematoma. The ventricles, the basal cistern and the cortical sulci and unremarkable. Moderate chronic white matter ischemic changes are similar to the previous study. The gray-white matter differentiation is maintained. The images reviewed in bone window show no acute bony abnormality. The visualized paranasal sinuses and mastoid air cells are unremarkable. No acute intracranial abnormality. The above study was initially reviewed and reported by [facility]. I do not find any discrepancies. Signed by Dr Salina Schilder on 10/21/2020 6:48 AM       Assessment   1. ESRD  2. Type 2 DM with nephropathy, with severe hyperglycemia and hyperosmolarity  3. Seizures activities  4. Encephalopathy  5. Severe hypertension  6. UTI  7. Pseudohyponatremia  8. Anemia of CKD      Plan:  Dialysis as above. Resume oral BP meds. Follow up labs.       Lali Meyer MD  10/24/20  9:42 AM

## 2020-10-24 NOTE — PROGRESS NOTES
Hospitalist Progress Note  10/24/2020 9:51 AM  Subjective:   Admit Date: 10/21/2020  PCP: ROMINA Jordan    Subjective: Weaned off nicaridipine gtt and moved out of ICU yesterday. BP then elevated again overnight. Received some PRNs. RN tells me they attempted to transfer her but no beds available. Also desat to 60%. Currently on 2L NC. She is currently receiving HD with  systolic. She tells me she is short of breath and wants antibiotics for the fluid in her lungs. Denies chest pain. Has cough. ROS: Six point review of systems is negative except as specifically addressed above.     DIET RENAL; Carb Control: 3 carb choices (45 gms)/meal    Intake/Output Summary (Last 24 hours) at 10/24/2020 0951  Last data filed at 10/23/2020 2018  Gross per 24 hour   Intake 875.66 ml   Output --   Net 875.66 ml     Medications:   dextrose       Current Facility-Administered Medications   Medication Dose Route Frequency Provider Last Rate Last Dose    cloNIDine (CATAPRES) 0.3 MG/24HR 1 patch  1 patch Transdermal Weekly Sonny Bro MD   1 patch at 10/24/20 0556    labetalol (NORMODYNE;TRANDATE) injection 20 mg  20 mg Intravenous Q4H PRN Mita Monzon MD        enalaprilat (VASOTEC) injection 1.25 mg  1.25 mg Intravenous Q6H PRN Mita Monzon MD        hydrALAZINE (APRESOLINE) tablet 75 mg  75 mg Oral 3 times per day Mita Monzon MD   75 mg at 10/24/20 0556    labetalol (NORMODYNE;TRANDATE) injection 10 mg  10 mg Intravenous Once Mita Monzon MD        nitroGLYCERIN (NITROSTAT) SL tablet 0.4 mg  0.4 mg Sublingual Q5 Min PRN Mita Monzon MD        levETIRAcetam (KEPPRA) tablet 500 mg  500 mg Oral Daily Kirsten Hart MD   500 mg at 10/24/20 0708    ondansetron (ZOFRAN) injection 4 mg  4 mg Intravenous Q6H PRN Sonny Bro MD   4 mg at 10/24/20 0112    metoprolol succinate (TOPROL XL) extended release tablet 100 mg  100 mg Oral Daily Mita Monzon MD   100 mg at 10/24/20 0931    lisinopril (PRINIVIL;ZESTRIL) tablet 20 mg  20 mg Oral BID Malia Feliciano MD   20 mg at 10/24/20 0931    NIFEdipine (PROCARDIA XL) extended release tablet 60 mg  60 mg Oral Daily Malia Feliciano MD   60 mg at 10/24/20 0745    isosorbide mononitrate (IMDUR) extended release tablet 120 mg  120 mg Oral Daily Malia Feliciano MD   120 mg at 10/24/20 0931    glucose (GLUTOSE) 40 % oral gel 15 g  15 g Oral PRN Malia Feliciano MD        dextrose 50 % IV solution  12.5 g Intravenous PRN Malia Feliciano MD   12.5 g at 10/22/20 2128    glucagon (rDNA) injection 1 mg  1 mg Intramuscular PRN Malia Feliciano MD        dextrose 5 % solution  100 mL/hr Intravenous PRN Malia Feliciano MD        albuterol (PROVENTIL) nebulizer solution 2.5 mg  2.5 mg Nebulization Q4H PRN Malia Feliciano MD        dextrose 50 % IV solution  12.5 g Intravenous PRN Malia Feliciano MD   12.5 g at 10/22/20 1225    potassium chloride 10 mEq/100 mL IVPB (Peripheral Line)  10 mEq Intravenous PRN Malia Feliciano  mL/hr at 10/21/20 1221 10 mEq at 10/21/20 1221    magnesium sulfate 1 g in dextrose 5% 100 mL IVPB  1 g Intravenous PRN Malia Feliciano MD        sodium phosphate 10 mmol in dextrose 5 % 250 mL IVPB  10 mmol Intravenous PRN Malia Feliciano MD   Stopped at 10/22/20 0940    Or    sodium phosphate 15 mmol in dextrose 5 % 250 mL IVPB  15 mmol Intravenous PRN Malia Feliciano MD        Or    sodium phosphate 20 mmol in dextrose 5 % 500 mL IVPB  20 mmol Intravenous PRDAVI Feliciano MD   Stopped at 10/21/20 2147    heparin (porcine) injection 5,000 Units  5,000 Units Subcutaneous Q8H Malia Feliciano MD   5,000 Units at 10/24/20 0112    insulin lispro (HUMALOG) injection vial 0-18 Units  0-18 Units Subcutaneous TID WC Malia Feliciano MD   3 Units at 10/23/20 1206    insulin lispro (HUMALOG) injection vial 0-9 Units  0-9 Units Subcutaneous Nightly Malia Feliciano MD   6 Units at 10/23/20 2146        Labs:     Recent Labs     10/22/20  0151 10/23/20  0511 10/24/20  0246   WBC 8.6 7.2 6.5   RBC 3.32* 2.80* 2.60*   HGB 10.6* 9.0* 8.2*   HCT 29.8* 26.3* 25.2*   MCV 89.8 93.9 96.9   MCH 31.9* 32.1* 31.5*   MCHC 35.6 34.2 32.5*   * 106* 94*     Recent Labs     10/23/20  0511 10/23/20  0742 10/24/20  0246   * 133* 132*   K 4.4 4.5 4.7   ANIONGAP 12 9 9   CL 96* 96* 94*   CO2 27 28 29   BUN 24* 25* 28*   CREATININE 3.3* 3.6* 3.1*   GLUCOSE 79 94 322*   CALCIUM 8.4* 8.6 8.6     Recent Labs     10/22/20  1446 10/23/20  0511 10/23/20  0742 10/24/20  0246   MG  --  2.1 2.1 2.3   PHOS 2.5 3.2  --  3.1     No results for input(s): AST, ALT, ALB, BILITOT, ALKPHOS, ALB in the last 72 hours. ABGs:No results for input(s): PH, PO2, PCO2, HCO3, BE, O2SAT in the last 72 hours. Troponin T:   Recent Labs     10/23/20  0742 10/23/20  1357 10/23/20  1935   TROPONINI 0.07* 0.07* 0.08*     INR: No results for input(s): INR in the last 72 hours. Lactic Acid:   No results for input(s): LACTA in the last 72 hours.     Objective:   Vitals: BP (!) 209/88   Pulse 74   Temp 97.6 °F (36.4 °C) (Temporal)   Resp 18   Ht 5' 7\" (1.702 m)   Wt 162 lb 1 oz (73.5 kg)   SpO2 94%   BMI 25.38 kg/m²   24HR INTAKE/OUTPUT:      Intake/Output Summary (Last 24 hours) at 10/24/2020 0951  Last data filed at 10/23/2020 2018  Gross per 24 hour   Intake 875.66 ml   Output --   Net 875.66 ml     General appearance: laying in bed in HD with towel over her head   Head: NC/AT   Eyes: normal sclera  Mouth: MMM   Lungs: tachypnea, anterior rhonchi   Heart: tachycardia  Abdomen: soft, non-tender; bowel sounds normal; no masses,  no organomegaly  Extremities: extremities normal, atraumatic, no cyanosis or edema  Neurologic: no focal neurologic deficits, normal sensation, alert and oriented, affect and mood appropriate  Skin: no rashes, nodules    Assessment and Plan:   Principal Problem:    DKA, type 2, not at goal [facility]  Active Problems:    Acquired hypothyroidism Anemia in chronic kidney disease (CKD)    Type 2 diabetes mellitus with chronic kidney disease on chronic dialysis, with long-term current use of insulin ([address]    Noncompliance of patient with renal dialysis ([facility] [address]    Uncontrolled type 2 diabetes mellitus with complication, with long-term current use of insulin ([address]    ESRD on hemodialysis ([address]    Seizure ([facility] [address]    Hypertensive urgency    Altered mental state  Resolved Problems:    * No resolved hospital problems. [facility] emergency  Acute hypoxic respiratory failure, Suspect flash pulm edema (sat 78% on RA)  - has been on / off nicardipine gtt and still uncontrolled. - receiving HD right now per nephrology   - continue home BP meds  - increasing PRN labetalol to 20mg Q4H. Adding PRN enalapril. Hydralazine on backorder, unfortunately    Bilateral airspace opacities on CXR - ?HAP with low grade fevers. No leukocytosis. Also could be pulm edema. - check procal  - start HAP tx with levofloxacin (PCN allergy), vanc     Nonketotic hyperosmolar hyperglycemia. -Resolved. Uncontrolled type II DM  - restart lantus with high BS, 20 units BID   - high correction dosing  - continue to follow and adjust     ESRD,anemia of CKD - per nephrology    new onset seizures - likely related to malignant HTN, non-ketotic hyperosmolar hyperglycemia. Neurology following. On keppra. DVT ppx - heparin     Advance Directive: Full Code      Signed:   Sera West MD 10/24/2020 9:51 AM  Hospitalist

## 2020-10-24 NOTE — PROGRESS NOTES
Pharmacy Note  Vancomycin Consult    Neel Wagoner is a 45 y.o. female started on Vancomycin for HAP; consult received from Dr. Neal Holland to manage therapy. Also receiving the following antibiotics: levofloxacin. Principal Problem:    DKA, type 2, not at goal [facility])  Active Problems:    Acquired hypothyroidism    Anemia in chronic kidney disease (CKD)    Type 2 diabetes mellitus with chronic kidney disease on chronic dialysis, with long-term current use of insulin ([facility] [address]    Noncompliance of patient with renal dialysis ([facility] [address]    Uncontrolled type 2 diabetes mellitus with complication, with long-term current use of insulin ([facility] [address]    ESRD on hemodialysis ([facility] [address]    Seizure ([facility] [address]    Hypertensive urgency    Altered mental state  Resolved Problems:    * No resolved hospital problems. *      Allergies:  Penicillins; Lamisil advanced [terbinafine]; Stadol [butorphanol]; and Reglan [metoclopramide]     Temp max: 97.6    Recent Labs     10/23/20  0742 10/24/20  0246   BUN 25* 28*       Recent Labs     10/23/20  0742 10/24/20  0246   CREATININE 3.6* 3.1*       Recent Labs     10/23/20  0511 10/24/20  0246   WBC 7.2 6.5         Intake/Output Summary (Last 24 hours) at 10/24/2020 1207  Last data filed at 10/24/2020 1150  Gross per 24 hour   Intake 875.66 ml   Output 4000 ml   Net -3124.34 ml       Culture Date Source Results   10/21/20 urine No growth at 36-48 hours                 Ht Readings from Last 1 Encounters:   10/21/20 5' 7\" (1.702 m)        Wt Readings from Last 1 Encounters:   10/23/20 162 lb 1 oz (73.5 kg)         Body mass index is 25.38 kg/m². Estimated Creatinine Clearance: 21 mL/min (A) (based on SCr of 3.1 mg/dL (H)). Assessment/Plan:  Will initiate vancomycin 1000 mg IV x1. Timing of trough level will be determined based on culture results, renal function, and clinical response. Thank you for the consult. Will continue to follow.     Electronically signed by Lisa Mcginnis, [address] [place] [address] on 10/24/2020 at 12:07 PM

## 2020-10-25 LAB
ANION GAP SERPL CALCULATED.3IONS-SCNC: 11 MMOL/L (ref 7–19)
BASOPHILS ABSOLUTE: 0.1 K/UL (ref 0–0.2)
BASOPHILS RELATIVE PERCENT: 1.2 % (ref 0–1)
BUN BLDV-MCNC: 28 MG/DL (ref 6–20)
CALCIUM SERPL-MCNC: 8.5 MG/DL (ref 8.6–10)
CHLORIDE BLD-SCNC: 97 MMOL/L (ref 98–111)
CO2: 26 MMOL/L (ref 22–29)
CREAT SERPL-MCNC: 3.1 MG/DL (ref 0.5–0.9)
EOSINOPHILS ABSOLUTE: 0.2 K/UL (ref 0–0.6)
EOSINOPHILS RELATIVE PERCENT: 4.9 % (ref 0–5)
GFR AFRICAN AMERICAN: 19
GFR NON-AFRICAN AMERICAN: 16
GLUCOSE BLD-MCNC: 103 MG/DL (ref 70–99)
GLUCOSE BLD-MCNC: 125 MG/DL (ref 74–109)
GLUCOSE BLD-MCNC: 165 MG/DL (ref 70–99)
GLUCOSE BLD-MCNC: 184 MG/DL (ref 70–99)
GLUCOSE BLD-MCNC: 194 MG/DL (ref 70–99)
GLUCOSE BLD-MCNC: 200 MG/DL (ref 70–99)
GLUCOSE BLD-MCNC: 222 MG/DL (ref 70–99)
GLUCOSE BLD-MCNC: 51 MG/DL (ref 70–99)
GLUCOSE BLD-MCNC: 60 MG/DL (ref 70–99)
HCT VFR BLD CALC: 23.6 % (ref 37–47)
HEMOGLOBIN: 7.7 G/DL (ref 12–16)
IMMATURE GRANULOCYTES #: 0 K/UL
LYMPHOCYTES ABSOLUTE: 1.2 K/UL (ref 1.1–4.5)
LYMPHOCYTES RELATIVE PERCENT: 23.7 % (ref 20–40)
MAGNESIUM: 2 MG/DL (ref 1.6–2.6)
MCH RBC QN AUTO: 32.5 PG (ref 27–31)
MCHC RBC AUTO-ENTMCNC: 32.6 G/DL (ref 33–37)
MCV RBC AUTO: 99.6 FL (ref 81–99)
MONOCYTES ABSOLUTE: 0.4 K/UL (ref 0–0.9)
MONOCYTES RELATIVE PERCENT: 9.1 % (ref 0–10)
NEUTROPHILS ABSOLUTE: 2.9 K/UL (ref 1.5–7.5)
NEUTROPHILS RELATIVE PERCENT: 60.7 % (ref 50–65)
PDW BLD-RTO: 13.2 % (ref 11.5–14.5)
PERFORMED ON: ABNORMAL
PHOSPHORUS: 2.7 MG/DL (ref 2.5–4.5)
PLATELET # BLD: 79 K/UL (ref 130–400)
PMV BLD AUTO: 10.6 FL (ref 9.4–12.3)
POTASSIUM SERPL-SCNC: 4.6 MMOL/L (ref 3.5–5)
RBC # BLD: 2.37 M/UL (ref 4.2–5.4)
SODIUM BLD-SCNC: 134 MMOL/L (ref 136–145)
WBC # BLD: 4.9 K/UL (ref 4.8–10.8)

## 2020-10-25 PROCEDURE — 6370000000 HC RX 637 (ALT 250 FOR IP): Performed by: INTERNAL MEDICINE

## 2020-10-25 PROCEDURE — 87081 CULTURE SCREEN ONLY: CPT

## 2020-10-25 PROCEDURE — 80048 BASIC METABOLIC PNL TOTAL CA: CPT

## 2020-10-25 PROCEDURE — 6360000002 HC RX W HCPCS: Performed by: INTERNAL MEDICINE

## 2020-10-25 PROCEDURE — 2700000000 HC OXYGEN THERAPY PER DAY

## 2020-10-25 PROCEDURE — 85025 COMPLETE CBC W/AUTO DIFF WBC: CPT

## 2020-10-25 PROCEDURE — 36415 COLL VENOUS BLD VENIPUNCTURE: CPT

## 2020-10-25 PROCEDURE — 6370000000 HC RX 637 (ALT 250 FOR IP): Performed by: PSYCHIATRY & NEUROLOGY

## 2020-10-25 PROCEDURE — 2500000003 HC RX 250 WO HCPCS: Performed by: INTERNAL MEDICINE

## 2020-10-25 PROCEDURE — 1210000000 HC MED SURG R&B

## 2020-10-25 PROCEDURE — 83735 ASSAY OF MAGNESIUM: CPT

## 2020-10-25 PROCEDURE — 82947 ASSAY GLUCOSE BLOOD QUANT: CPT

## 2020-10-25 PROCEDURE — 84100 ASSAY OF PHOSPHORUS: CPT

## 2020-10-25 RX ORDER — INSULIN GLARGINE 100 [IU]/ML
15 INJECTION, SOLUTION SUBCUTANEOUS NIGHTLY
Status: DISCONTINUED | OUTPATIENT
Start: 2020-10-25 | End: 2020-10-26 | Stop reason: HOSPADM

## 2020-10-25 RX ADMIN — LISINOPRIL 20 MG: 20 TABLET ORAL at 12:17

## 2020-10-25 RX ADMIN — LEVETIRACETAM 500 MG: 500 TABLET ORAL at 12:30

## 2020-10-25 RX ADMIN — INSULIN LISPRO 3 UNITS: 100 INJECTION, SOLUTION INTRAVENOUS; SUBCUTANEOUS at 16:43

## 2020-10-25 RX ADMIN — HEPARIN SODIUM 5000 UNITS: 5000 INJECTION INTRAVENOUS; SUBCUTANEOUS at 16:42

## 2020-10-25 RX ADMIN — INSULIN LISPRO 3 UNITS: 100 INJECTION, SOLUTION INTRAVENOUS; SUBCUTANEOUS at 12:27

## 2020-10-25 RX ADMIN — NIFEDIPINE 60 MG: 60 TABLET, FILM COATED, EXTENDED RELEASE ORAL at 12:17

## 2020-10-25 RX ADMIN — HEPARIN SODIUM 5000 UNITS: 5000 INJECTION INTRAVENOUS; SUBCUTANEOUS at 12:17

## 2020-10-25 RX ADMIN — METOPROLOL SUCCINATE 100 MG: 50 TABLET, EXTENDED RELEASE ORAL at 12:16

## 2020-10-25 RX ADMIN — HYDRALAZINE HYDROCHLORIDE 75 MG: 25 TABLET, FILM COATED ORAL at 14:50

## 2020-10-25 RX ADMIN — ENALAPRILAT 1.25 MG: 1.25 INJECTION INTRAVENOUS at 16:35

## 2020-10-25 RX ADMIN — INSULIN LISPRO 3 UNITS: 100 INJECTION, SOLUTION INTRAVENOUS; SUBCUTANEOUS at 22:41

## 2020-10-25 RX ADMIN — ISOSORBIDE MONONITRATE 120 MG: 60 TABLET, EXTENDED RELEASE ORAL at 12:17

## 2020-10-25 RX ADMIN — HYDRALAZINE HYDROCHLORIDE 75 MG: 25 TABLET, FILM COATED ORAL at 21:00

## 2020-10-25 RX ADMIN — HYDRALAZINE HYDROCHLORIDE 75 MG: 25 TABLET, FILM COATED ORAL at 06:32

## 2020-10-25 RX ADMIN — HEPARIN SODIUM 5000 UNITS: 5000 INJECTION INTRAVENOUS; SUBCUTANEOUS at 11:37

## 2020-10-25 RX ADMIN — Medication 15 UNITS: at 22:41

## 2020-10-25 RX ADMIN — LISINOPRIL 20 MG: 20 TABLET ORAL at 21:00

## 2020-10-25 ASSESSMENT — PAIN SCALES - GENERAL: PAINLEVEL_OUTOF10: 0

## 2020-10-25 NOTE — PROGRESS NOTES
Hospitalist Progress Note  10/25/2020 8:48 AM  Subjective:   Admit Date: 10/21/2020  PCP: ROMINA Denny    Subjective:   Low BG at 60. BP still high. Says she doesn't feel well. Says coughing up blood. Per RN it is actually in nasal drainage. I examined tissue at bedside- small amount of blood mixed in with mucous. Denies SOB. ROS: Six point review of systems is negative except as specifically addressed above.     DIET RENAL; Carb Control: 3 carb choices (45 gms)/meal    Intake/Output Summary (Last 24 hours) at 10/25/2020 0848  Last data filed at 10/24/2020 2224  Gross per 24 hour   Intake 480 ml   Output 4000 ml   Net -3520 ml     Medications:   dextrose       Current Facility-Administered Medications   Medication Dose Route Frequency Provider Last Rate Last Dose    insulin glargine (LANTUS) injection vial 15 Units  15 Units Subcutaneous Nightly Malia Feliciano MD        cloNIDine (CATAPRES) 0.3 MG/24HR 1 patch  1 patch Transdermal Weekly Juanna Saint, MD   1 patch at 10/24/20 0556    labetalol (NORMODYNE;TRANDATE) injection 20 mg  20 mg Intravenous Q4H PRN Malia Feliciano MD   20 mg at 10/24/20 1759    enalaprilat (VASOTEC) injection 1.25 mg  1.25 mg Intravenous Q6H PRN Malia Feliciano MD   1.25 mg at 10/24/20 2209    [START ON 10/26/2020] levoFLOXacin (LEVAQUIN) 500 MG/100ML infusion 500 mg  500 mg Intravenous Q48H Malia Feliciano MD        [facility]) intermittent dosing (placeholder)   Other RX Placeholder Malia Feliciano MD        hydrALAZINE (APRESOLINE) tablet 75 mg  75 mg Oral 3 times per day Malia Feliciano MD   75 mg at 10/25/20 7554    labetalol (NORMODYNE;TRANDATE) injection 10 mg  10 mg Intravenous Once Malia Feliciano MD        nitroGLYCERIN (NITROSTAT) SL tablet 0.4 mg  0.4 mg Sublingual Q5 Min PRN Malia Feliciano MD        levETIRAcetam (KEPPRA) tablet 500 mg  500 mg Oral Daily Ramon Jay MD   500 mg at 10/24/20 0708    ondansetron [facility] injection 4 mg  4 mg Intravenous Q6H PRN Tejinder Anand MD   4 mg at 10/24/20 1218    metoprolol succinate (TOPROL XL) extended release tablet 100 mg  100 mg Oral Daily Yuko Garcia MD   100 mg at 10/24/20 0931    lisinopril (PRINIVIL;ZESTRIL) tablet 20 mg  20 mg Oral BID Yuko Garcia MD   20 mg at 10/24/20 2236    NIFEdipine (PROCARDIA XL) extended release tablet 60 mg  60 mg Oral Daily Yuko Garcia MD   60 mg at 10/24/20 9034    isosorbide mononitrate (IMDUR) extended release tablet 120 mg  120 mg Oral Daily Yuko Garcia MD   120 mg at 10/24/20 0931    glucose (GLUTOSE) 40 % oral gel 15 g  15 g Oral PRN Yuko Garcia MD        dextrose 50 % IV solution  12.5 g Intravenous PRN Yuko Garcia MD   12.5 g at 10/22/20 2128    glucagon (rDNA) injection 1 mg  1 mg Intramuscular PRN Yuko Garcia MD        dextrose 5 % solution  100 mL/hr Intravenous PRN Yuko Garcia MD        albuterol (PROVENTIL) nebulizer solution 2.5 mg  2.5 mg Nebulization Q4H PRN Yuko Garcia MD        dextrose 50 % IV solution  12.5 g Intravenous PRN Yuko Garcia MD   12.5 g at 10/22/20 1225    potassium chloride 10 mEq/100 mL IVPB (Peripheral Line)  10 mEq Intravenous ANUPN Yuko Garcia  mL/hr at 10/21/20 1221 10 mEq at 10/21/20 1221    magnesium sulfate 1 g in dextrose 5% 100 mL IVPB  1 g Intravenous PRN Yuko Garcia MD        sodium phosphate 10 mmol in dextrose 5 % 250 mL IVPB  10 mmol Intravenous ANUPN Yuko Garcia MD   Stopped at 10/22/20 0940    Or    sodium phosphate 15 mmol in dextrose 5 % 250 mL IVPB  15 mmol Intravenous PRDAVI Garcia MD        Or    sodium phosphate 20 mmol in dextrose 5 % 500 mL IVPB  20 mmol Intravenous PRN Yuko Garcia MD   Stopped at 10/21/20 2147    heparin (porcine) injection 5,000 Units  5,000 Units Subcutaneous Q8H Yuko Garcia MD   5,000 Units at 10/24/20 2203    insulin lispro (HUMALOG) injection vial 0-18 Units  0-18 Units Subcutaneous TID WC Ana Tao MD   12 Units at 10/24/20 1754    insulin lispro (HUMALOG) injection vial 0-9 Units  0-9 Units Subcutaneous Nightly Ana Tao MD   2 Units at 10/24/20 2322        Labs:     Recent Labs     10/23/20  0511 10/24/20  0246 10/25/20  0144   WBC 7.2 6.5 4.9   RBC 2.80* 2.60* 2.37*   HGB 9.0* 8.2* 7.7*   HCT 26.3* 25.2* 23.6*   MCV 93.9 96.9 99.6*   MCH 32.1* 31.5* 32.5*   MCHC 34.2 32.5* 32.6*   * 94* 79*     Recent Labs     10/23/20  0742 10/24/20  0246 10/25/20  0144   * 132* 134*   K 4.5 4.7 4.6   ANIONGAP 9 9 11   CL 96* 94* 97*   CO2 28 29 26   BUN 25* 28* 28*   CREATININE 3.6* 3.1* 3.1*   GLUCOSE 94 322* 125*   CALCIUM 8.6 8.6 8.5*     Recent Labs     10/23/20  0511 10/23/20  0742 10/24/20  0246 10/25/20  0144   MG 2.1 2.1 2.3 2.0   PHOS 3.2  --  3.1 2.7     No results for input(s): AST, ALT, ALB, BILITOT, ALKPHOS, ALB in the last 72 hours. ABGs:No results for input(s): PH, PO2, PCO2, HCO3, BE, O2SAT in the last 72 hours. Troponin T:   Recent Labs     10/23/20  0742 10/23/20  1357 10/23/20  1935   TROPONINI 0.07* 0.07* 0.08*     INR: No results for input(s): INR in the last 72 hours. Lactic Acid:   No results for input(s): LACTA in the last 72 hours. Objective:   Vitals: BP (!) 197/89 Comment: meds given  Pulse 66   Temp 97 °F (36.1 °C) (Temporal)   Resp 18   Ht 5' 7\" (1.702 m)   Wt 162 lb 1 oz (73.5 kg)   SpO2 92%   BMI 25.38 kg/m²   24HR INTAKE/OUTPUT:      Intake/Output Summary (Last 24 hours) at 10/25/2020 0848  Last data filed at 10/24/2020 2224  Gross per 24 hour   Intake 480 ml   Output 4000 ml   Net -3520 ml     General appearance: laying in bed in HD with towel over her head   Head: NC/AT   Eyes: normal sclera  Mouth: MMM   Lungs: bibasilar crackles with LLL wheeze. Non labored. On RA.    Heart: RRR  Abdomen: soft, non-tender; bowel sounds normal; no masses,  no organomegaly  Extremities: extremities normal, atraumatic, no cyanosis or edema  Neurologic: no focal neurologic deficits, normal sensation, alert and oriented, affect and mood appropriate  Skin: no rashes, nodules    Assessment and Plan:   Principal Problem:    DKA, type 2, not at goal [facility])  Active Problems:    Acquired hypothyroidism    Anemia in chronic kidney disease (CKD)    Type 2 diabetes mellitus with chronic kidney disease on chronic dialysis, with long-term current use of insulin ([address]    Noncompliance of patient with renal dialysis ([facility] [address]    Uncontrolled type 2 diabetes mellitus with complication, with long-term current use of insulin ([address]    ESRD on hemodialysis ([facility] [address]    Seizure ([facility] [address]    Hypertensive urgency    Altered mental state  Resolved Problems:    * No resolved hospital problems. [facility] emergency  Acute hypoxic respiratory failure, Suspect flash pulm edema (sat 78% on RA)  - has been on / off nicardipine gtt and still uncontrolled. - receiving HD per nephrology   - continue home BP meds  - PRN labetalol, enalapril    Bilateral airspace opacities on CXR - ?HAP with low grade fevers. No leukocytosis. Also could be pulm edema. - continue HAP tx with levofloxacin (PCN allergy), vanc   - send MRSA swab     Nonketotic hyperosmolar hyperglycemia. -Resolved. Uncontrolled type II DM  - very labile. Reduced lantus to 15 unts QHS   - high correction dosing  - continue to follow and adjust     ESRD,anemia of CKD - per nephrology    new onset seizures - likely related to malignant HTN, non-ketotic hyperosmolar hyperglycemia. Neurology following. On keppra. DVT ppx - heparin     Advance Directive: Full Code      Signed:   Carmelita Smith MD 10/25/2020 8:48 AM  Hospitalist

## 2020-10-25 NOTE — PROGRESS NOTES
vancomycin (VANCOCIN) intermittent dosing (placeholder)   Other RX Placeholder Shirley Lorenzana MD        hydrALAZINE (APRESOLINE) tablet 75 mg  75 mg Oral 3 times per day Shirley Lorenzana MD   75 mg at 10/25/20 8210    labetalol (NORMODYNE;TRANDATE) injection 10 mg  10 mg Intravenous Once Shirley Lorenzana MD        nitroGLYCERIN (NITROSTAT) SL tablet 0.4 mg  0.4 mg Sublingual Q5 Min PRN Shirley Lorenzana MD        levETIRAcetam (KEPPRA) tablet 500 mg  500 mg Oral Daily Shane Lopez MD   500 mg at 10/24/20 0708    ondansetron (ZOFRAN) injection 4 mg  4 mg Intravenous Q6H PRN Twyla Sun MD   4 mg at 10/24/20 1218    metoprolol succinate (TOPROL XL) extended release tablet 100 mg  100 mg Oral Daily Shirley Lorenzana MD   100 mg at 10/24/20 0931    lisinopril (PRINIVIL;ZESTRIL) tablet 20 mg  20 mg Oral BID Shirley Lorenzana MD   20 mg at 10/24/20 2236    NIFEdipine (PROCARDIA XL) extended release tablet 60 mg  60 mg Oral Daily Shirley Lorenzana MD   60 mg at 10/24/20 1206    isosorbide mononitrate (IMDUR) extended release tablet 120 mg  120 mg Oral Daily Shirley Lorenzana MD   120 mg at 10/24/20 0931    glucose (GLUTOSE) 40 % oral gel 15 g  15 g Oral PRN Shirley Lorenzana MD        dextrose 50 % IV solution  12.5 g Intravenous PRN Shirley Lorenzana MD   12.5 g at 10/22/20 2128    glucagon (rDNA) injection 1 mg  1 mg Intramuscular PRN Shirley Lorenzana MD        dextrose 5 % solution  100 mL/hr Intravenous PRN Shirley Lorenzana MD        albuterol (PROVENTIL) nebulizer solution 2.5 mg  2.5 mg Nebulization Q4H PRN Shirley Lorenzana MD        dextrose 50 % IV solution  12.5 g Intravenous PRN Shirley Lorenzana MD   12.5 g at 10/22/20 1225    potassium chloride 10 mEq/100 mL IVPB (Peripheral Line)  10 mEq Intravenous PRN Shirley Lorenzana  mL/hr at 10/21/20 1221 10 mEq at 10/21/20 1221    magnesium sulfate 1 g in dextrose 5% 100 mL IVPB  1 g Intravenous PRN Shirley Lorenzana MD        sodium phosphate 10 mmol in dextrose 5 % 250 mL IVPB  10 mmol Intravenous PRN Shirley Lorenzana MD   Stopped at 10/22/20 0940    Or    sodium phosphate 15 mmol in dextrose 5 % 250 mL IVPB  15 mmol Intravenous PRN Shirley Lorenzana MD        Or    sodium phosphate 20 mmol in dextrose 5 % 500 mL IVPB  20 mmol Intravenous PRN Shirley Lorenzana MD   Stopped at 10/21/20 2147    heparin (porcine) injection 5,000 Units  5,000 Units Subcutaneous Q8H Shirley Lorenzana MD   5,000 Units at 10/24/20 2209    insulin lispro (HUMALOG) injection vial 0-18 Units  0-18 Units Subcutaneous TID WC Shirley Lorenzana MD   12 Units at 10/24/20 1754    insulin lispro (HUMALOG) injection vial 0-9 Units  0-9 Units Subcutaneous Nightly Shirley Lorenzana MD   2 Units at 10/24/20 2322       Past Medical History:  Past Medical History:   Diagnosis Date    Arthritis     Chronic kidney disease, stage 5, kidney failure ([address]     Closed fracture of right shoulder girdle, with routine healing, subsequent encounter     DM (diabetes mellitus) type II controlled with renal manifestation ([facility] [address] 06/1993    Gastroparesis     GERD (gastroesophageal reflux disease)     Hemodialysis patient ([facility] [address]     dialysis on tues, thur, and sat at [facility]    Hypertension     Hypothyroid     Nasal congestion     recent    Noncompliance with medications     Palliative care patient 10/08/2019    Restless leg syndrome        Past Surgical History:  Past Surgical History:   Procedure Laterality Date    BREAST SURGERY      infected milk gland removed, 2004    CHOLECYSTECTOMY, LAPAROSCOPIC      2008    COLONOSCOPY Left 9/17/2020    Dr Peg Araya hepatic flexure-Severe tortuosity with severe spasming, 1 yr recall    DIALYSIS FISTULA CREATION Left 1/25/2019    REVISION LEFT UPPER EXTREMITY AV ACCESS WITH LEFT BRACHIAL ARTERY TO LEFT AXILLARY VEIN WITH  INTERPOSITIONAL ARTEGRAFT   performed by Josephine Garcia MD at [address]  2012    [address] Right 1/25/2019 AST, PROT, BILITOT, BILIDIR, LABALBU in the last 72 hours. Lactic Acid:   No results for input(s): LACTA in the last 72 hours. Troponin: No results for input(s): CKTOTAL, CKMB, TROPONINT in the last 72 hours. ABGs: No results for input(s): PH, PCO2, PO2, HCO3, O2SAT in the last 72 hours. CRP:  No results for input(s): CRP in the last 72 hours. Sed Rate:  No results for input(s): SEDRATE in the last 72 hours. Cultures:   No results for input(s): CULTURE in the last 72 hours. Radiology reports as per the Radiologist  Radiology: Ct Head Wo Contrast    Result Date: 10/21/2020  Examination. CT HEAD WO CONTRAST 10/21/2020 3:15 AM History: Altered mental status. DLP: 1723 mGycm. The CT scan of the head is performed without intravenous contrast enhancement. The images are acquired in axial plane with subsequent reconstruction in coronal and sagittal planes. The comparison is made with the previous study dated 10/8/2019. There are imaging artifacts due to patient motion which lowers the diagnostic yield of the study. There is no evidence of a mass. There is no evidence of intracranial hemorrhage or hematoma. The ventricles, the basal cistern and the cortical sulci and unremarkable. Moderate chronic white matter ischemic changes are similar to the previous study. The gray-white matter differentiation is maintained. The images reviewed in bone window show no acute bony abnormality. The visualized paranasal sinuses and mastoid air cells are unremarkable. No acute intracranial abnormality. The above study was initially reviewed and reported by [facility]. I do not find any discrepancies. Signed by Dr Juani Santana on 10/21/2020 6:48 AM       Assessment     1. ESRD  2. Type 2 DM with nephropathy, with severe hyperglycemia and hyperosmolarity  3. Seizures activities  4. Encephalopathy  5. Severe hypertension  6. UTI  7. Pseudohyponatremia  8. Anemia of CKD  9.  Nephrogenic pulmonary edema with resp distress    Plan: Will provide more dialysis on 10/27. Follow up labs. Continue antibiotics.

## 2020-10-26 VITALS
TEMPERATURE: 97 F | HEIGHT: 67 IN | RESPIRATION RATE: 14 BRPM | WEIGHT: 164.6 LBS | OXYGEN SATURATION: 97 % | HEART RATE: 80 BPM | BODY MASS INDEX: 25.83 KG/M2 | DIASTOLIC BLOOD PRESSURE: 84 MMHG | SYSTOLIC BLOOD PRESSURE: 181 MMHG

## 2020-10-26 LAB
ANION GAP SERPL CALCULATED.3IONS-SCNC: 13 MMOL/L (ref 7–19)
BASOPHILS ABSOLUTE: 0.1 K/UL (ref 0–0.2)
BASOPHILS RELATIVE PERCENT: 1.6 % (ref 0–1)
BUN BLDV-MCNC: 46 MG/DL (ref 6–20)
CALCIUM SERPL-MCNC: 8.9 MG/DL (ref 8.6–10)
CHLORIDE BLD-SCNC: 90 MMOL/L (ref 98–111)
CO2: 24 MMOL/L (ref 22–29)
CREAT SERPL-MCNC: 4.1 MG/DL (ref 0.5–0.9)
EOSINOPHILS ABSOLUTE: 0.3 K/UL (ref 0–0.6)
EOSINOPHILS RELATIVE PERCENT: 4.4 % (ref 0–5)
GFR AFRICAN AMERICAN: 14
GFR NON-AFRICAN AMERICAN: 11
GLUCOSE BLD-MCNC: 145 MG/DL (ref 70–99)
GLUCOSE BLD-MCNC: 262 MG/DL (ref 74–109)
GLUCOSE BLD-MCNC: 297 MG/DL (ref 70–99)
HCT VFR BLD CALC: 25.7 % (ref 37–47)
HEMOGLOBIN: 8.4 G/DL (ref 12–16)
IMMATURE GRANULOCYTES #: 0 K/UL
LYMPHOCYTES ABSOLUTE: 1.4 K/UL (ref 1.1–4.5)
LYMPHOCYTES RELATIVE PERCENT: 24.9 % (ref 20–40)
MAGNESIUM: 2.3 MG/DL (ref 1.6–2.6)
MCH RBC QN AUTO: 32.1 PG (ref 27–31)
MCHC RBC AUTO-ENTMCNC: 32.7 G/DL (ref 33–37)
MCV RBC AUTO: 98.1 FL (ref 81–99)
MONOCYTES ABSOLUTE: 0.6 K/UL (ref 0–0.9)
MONOCYTES RELATIVE PERCENT: 11 % (ref 0–10)
NEUTROPHILS ABSOLUTE: 3.3 K/UL (ref 1.5–7.5)
NEUTROPHILS RELATIVE PERCENT: 57.9 % (ref 50–65)
PDW BLD-RTO: 13 % (ref 11.5–14.5)
PERFORMED ON: ABNORMAL
PERFORMED ON: ABNORMAL
PHOSPHORUS: 3.1 MG/DL (ref 2.5–4.5)
PLATELET # BLD: 106 K/UL (ref 130–400)
PMV BLD AUTO: 10.8 FL (ref 9.4–12.3)
POTASSIUM SERPL-SCNC: 5.3 MMOL/L (ref 3.5–5)
RBC # BLD: 2.62 M/UL (ref 4.2–5.4)
SODIUM BLD-SCNC: 127 MMOL/L (ref 136–145)
WBC # BLD: 5.6 K/UL (ref 4.8–10.8)

## 2020-10-26 PROCEDURE — 99232 SBSQ HOSP IP/OBS MODERATE 35: CPT | Performed by: PSYCHIATRY & NEUROLOGY

## 2020-10-26 PROCEDURE — 36415 COLL VENOUS BLD VENIPUNCTURE: CPT

## 2020-10-26 PROCEDURE — 82947 ASSAY GLUCOSE BLOOD QUANT: CPT

## 2020-10-26 PROCEDURE — 6360000002 HC RX W HCPCS: Performed by: INTERNAL MEDICINE

## 2020-10-26 PROCEDURE — 2500000003 HC RX 250 WO HCPCS: Performed by: INTERNAL MEDICINE

## 2020-10-26 PROCEDURE — 2700000000 HC OXYGEN THERAPY PER DAY

## 2020-10-26 PROCEDURE — 6370000000 HC RX 637 (ALT 250 FOR IP): Performed by: PSYCHIATRY & NEUROLOGY

## 2020-10-26 PROCEDURE — 84100 ASSAY OF PHOSPHORUS: CPT

## 2020-10-26 PROCEDURE — 6370000000 HC RX 637 (ALT 250 FOR IP): Performed by: INTERNAL MEDICINE

## 2020-10-26 PROCEDURE — 83735 ASSAY OF MAGNESIUM: CPT

## 2020-10-26 PROCEDURE — 85025 COMPLETE CBC W/AUTO DIFF WBC: CPT

## 2020-10-26 PROCEDURE — 80048 BASIC METABOLIC PNL TOTAL CA: CPT

## 2020-10-26 RX ORDER — INSULIN GLARGINE 100 [IU]/ML
26 INJECTION, SOLUTION SUBCUTANEOUS DAILY
Qty: 5 PEN | Refills: 3 | Status: ON HOLD
Start: 2020-10-26 | End: 2021-02-25 | Stop reason: SDUPTHER

## 2020-10-26 RX ORDER — LEVOFLOXACIN 500 MG/1
500 TABLET, FILM COATED ORAL
Qty: 2 TABLET | Refills: 0 | Status: SHIPPED | OUTPATIENT
Start: 2020-10-26 | End: 2020-10-29

## 2020-10-26 RX ORDER — LEVETIRACETAM 500 MG/1
500 TABLET ORAL DAILY
Qty: 60 TABLET | Refills: 3 | Status: SHIPPED | OUTPATIENT
Start: 2020-10-27 | End: 2021-04-29 | Stop reason: ALTCHOICE

## 2020-10-26 RX ORDER — HYDRALAZINE HYDROCHLORIDE 50 MG/1
75 TABLET, FILM COATED ORAL EVERY 8 HOURS SCHEDULED
Qty: 90 TABLET | Refills: 0 | Status: ON HOLD | OUTPATIENT
Start: 2020-10-26 | End: 2021-11-25 | Stop reason: HOSPADM

## 2020-10-26 RX ORDER — HYDROCHLOROTHIAZIDE 25 MG/1
50 TABLET ORAL DAILY
Qty: 30 TABLET | Refills: 3 | Status: ON HOLD | OUTPATIENT
Start: 2020-10-26 | End: 2022-01-02

## 2020-10-26 RX ORDER — NICOTINE 21 MG/24HR
1 PATCH, TRANSDERMAL 24 HOURS TRANSDERMAL DAILY
Qty: 14 PATCH | Refills: 5 | Status: SHIPPED | OUTPATIENT
Start: 2020-10-26 | End: 2021-04-29 | Stop reason: ALTCHOICE

## 2020-10-26 RX ADMIN — INSULIN LISPRO 9 UNITS: 100 INJECTION, SOLUTION INTRAVENOUS; SUBCUTANEOUS at 08:53

## 2020-10-26 RX ADMIN — HYDRALAZINE HYDROCHLORIDE 75 MG: 25 TABLET, FILM COATED ORAL at 06:21

## 2020-10-26 RX ADMIN — ISOSORBIDE MONONITRATE 120 MG: 60 TABLET, EXTENDED RELEASE ORAL at 08:53

## 2020-10-26 RX ADMIN — INSULIN LISPRO 3 UNITS: 100 INJECTION, SOLUTION INTRAVENOUS; SUBCUTANEOUS at 12:07

## 2020-10-26 RX ADMIN — METOPROLOL SUCCINATE 100 MG: 50 TABLET, EXTENDED RELEASE ORAL at 08:52

## 2020-10-26 RX ADMIN — NIFEDIPINE 60 MG: 60 TABLET, FILM COATED, EXTENDED RELEASE ORAL at 08:52

## 2020-10-26 RX ADMIN — ENALAPRILAT 1.25 MG: 1.25 INJECTION INTRAVENOUS at 10:38

## 2020-10-26 RX ADMIN — LEVETIRACETAM 500 MG: 500 TABLET ORAL at 08:52

## 2020-10-26 RX ADMIN — Medication 20 MG: at 11:28

## 2020-10-26 RX ADMIN — HEPARIN SODIUM 5000 UNITS: 5000 INJECTION INTRAVENOUS; SUBCUTANEOUS at 08:53

## 2020-10-26 RX ADMIN — LISINOPRIL 20 MG: 20 TABLET ORAL at 08:52

## 2020-10-26 NOTE — PROGRESS NOTES
 Liver Disease Neg Hx     Esophageal Cancer Neg Hx     Rectal Cancer Neg Hx        Social History  Social History     Socioeconomic History    Marital status: Single     Spouse name: Not on file    Number of children: 2    Years of education: Not on file    Highest education level: Not on file   Occupational History    Not on file   Social Needs    Financial resource strain: Not on file    Food insecurity     Worry: Not on file     Inability: Not on file   [employer] needs     Medical: Not on file     Non-medical: Not on file   Tobacco Use    Smoking status: Current Every Day Smoker     Packs/day: 2.00     Years: 33.00     Pack years: 66.00     Types: Cigarettes    Smokeless tobacco: Never Used    Tobacco comment: NOW at 1/4 PD, started at age 24 and has averaged 2 PPD   Substance and Sexual Activity    Alcohol use: No    Drug use: Not Currently     Types: Marijuana    Sexual activity: Not Currently     Partners: Male   Lifestyle    Physical activity     Days per week: Not on file     Minutes per session: Not on file    Stress: Not on file   Relationships    Social connections     Talks on phone: Not on file     Gets together: Not on file     Attends Religion service: Not on file     Active member of club or organization: Not on file     Attends meetings of clubs or organizations: Not on file     Relationship status: Not on file    Intimate partner violence     Fear of current or ex partner: Not on file     Emotionally abused: Not on file     Physically abused: Not on file     Forced sexual activity: Not on file   Other Topics Concern    Not on file   Social History Narrative    Not on file         Review of Systems:  History obtained from chart review and the patient  General ROS: No fever or chills  Respiratory ROS: No cough, +shortness of breath, -wheezing  Cardiovascular ROS: no chest pain but dyspnea on exertion  Gastrointestinal ROS: No abdominal pain or melena  Genito-Urinary ROS: No dysuria or hematuria  Musculoskeletal ROS: No joint pain or swelling         Objective:  Blood pressure (!) 193/83, pulse 80, temperature 97 °F (36.1 °C), temperature source Temporal, resp. rate 14, height 5' 7\" (1.702 m), weight 164 lb 9.6 oz (74.7 kg), SpO2 97 %. Intake/Output Summary (Last 24 hours) at 10/26/2020 1047  Last data filed at 10/26/2020 1038  Gross per 24 hour   Intake 1080 ml   Output --   Net 1080 ml     General: alert and oriented x3   Chest: bilateral wheezes  CVS: regular rate and rhythm  Abdominal: soft, nontender, normal bowel sounds  Extremities: no cyanosis or edema  Skin: warm and dry without rash    Labs:  BMP:   Recent Labs     10/24/20  0246 10/25/20  0144 10/26/20  0118   * 134* 127*   K 4.7 4.6 5.3*   CL 94* 97* 90*   CO2 29 26 24   PHOS 3.1 2.7 3.1   BUN 28* 28* 46*   CREATININE 3.1* 3.1* 4.1*   CALCIUM 8.6 8.5* 8.9     CBC:   Recent Labs     10/24/20  0246 10/25/20  0144 10/26/20  0118   WBC 6.5 4.9 5.6   HGB 8.2* 7.7* 8.4*   HCT 25.2* 23.6* 25.7*   MCV 96.9 99.6* 98.1   PLT 94* 79* 106*     LIVER PROFILE:   No results for input(s): AST, ALT, LIPASE, BILIDIR, BILITOT, ALKPHOS in the last 72 hours. Invalid input(s): AMYLASE,  ALB  PT/INR: No results for input(s): PROTIME, INR in the last 72 hours. APTT: No results for input(s): APTT in the last 72 hours. BNP:  No results for input(s): BNP in the last 72 hours. Ionized Calcium:No results for input(s): IONCA in the last 72 hours. Magnesium:  Recent Labs     10/24/20  0246 10/25/20  0144 10/26/20  0118   MG 2.3 2.0 2.3     Phosphorus:  Recent Labs     10/24/20  0246 10/25/20  0144 10/26/20  0118   PHOS 3.1 2.7 3.1     HgbA1C: No results for input(s): LABA1C in the last 72 hours. Hepatic:   No results for input(s): ALKPHOS, ALT, AST, PROT, BILITOT, BILIDIR, LABALBU in the last 72 hours. Lactic Acid:   No results for input(s): LACTA in the last 72 hours.   Troponin: No results for input(s): CKTOTAL, CKMB, TROPONINT

## 2020-10-26 NOTE — DISCHARGE SUMMARY
Discharge Summary    NAME: Evelyn Jacobsen  :  1970  MRN:  [id number]    Admit date:  10/21/2020  Discharge date:  10/26/2020    Admitting Physician: Tremayne Kearney MD    Advance Directive: Prior    Consults: IP CONSULT TO NEPHROLOGY  PALLIATIVE CARE EVAL  IP CONSULT TO NEUROLOGY  IP CONSULT TO SOCIAL WORK  IP CONSULT TO PHARMACY  PHARMACY TO DOSE MEDICATION    Primary Care Physician:  Rad Craig, APRN    Discharge Diagnoses:  Principal Problem:    DKA, type 2, not at goal [facility])  Active Problems:    Acquired hypothyroidism    Anemia in chronic kidney disease (CKD)    Type 2 diabetes mellitus with chronic kidney disease on chronic dialysis, with long-term current use of insulin ([address]    Noncompliance of patient with renal dialysis ([address]    Uncontrolled type 2 diabetes mellitus with complication, with long-term current use of insulin ([address]    ESRD on hemodialysis ([address]    Seizure ([address]    Hypertensive urgency    Altered mental state  Resolved Problems:    * No resolved hospital problems. *      HPI:  The patient is a 45 y.o. female presents to the ER with altered mental status. Her blood sugar was shown to be over thousand. She is in end-stage renal disease patient and receives maintenance dialysis. While in the emergency department, she had 2 seizures. Her blood pressure is 075 systolic on my assessment in the ICU. We will start her on a Cardene drip and ask nephrology and neurology to see her. Hospital Course:  [facility] emergency  Acute hypoxic respiratory failure, Suspect flash pulm edema (sat 78% on RA initially)  - on / off nicardipine gtt throughout admission. Nephrology managed her with extra sessions of HD.  - continue home BP meds. Hydralazine was increased to 75 mg 3 times daily, hydrochlorothiazide to 50 mg daily. - still higher overall, needs further adjustments by PCP and nephrology but renal was okay with her discharging today.  Will have HD in the AM.  - now on RA    Bilateral airspace opacities on CXR - ?HAP with low grade fevers. No leukocytosis. Also could be pulm edema. - continue HAP tx with levofloxacin (PCN allergy), vanc -> levofloxacin (renally dosed) on discharge      Nonketotic hyperosmolar hyperglycemia. -Resolved.     Uncontrolled type II DM  - very labile.   -Says that her insulin pump has not been working at home. She does have Lantus insulin pen. Advised her to continue using that until she can get back in with her PCP for management of insulin pump.     ESRD,anemia of CKD - per nephrology     new onset seizures - likely related to malignant HTN, non-ketotic hyperosmolar hyperglycemia. Neurology saw her. started On keppra.        Significant Diagnostic Studies:   Ct Head Wo Contrast    Result Date: 10/21/2020  Examination. CT HEAD WO CONTRAST 10/21/2020 3:15 AM History: Altered mental status. DLP: 1723 mGycm. The CT scan of the head is performed without intravenous contrast enhancement. The images are acquired in axial plane with subsequent reconstruction in coronal and sagittal planes. The comparison is made with the previous study dated 10/8/2019. There are imaging artifacts due to patient motion which lowers the diagnostic yield of the study. There is no evidence of a mass. There is no evidence of intracranial hemorrhage or hematoma. The ventricles, the basal cistern and the cortical sulci and unremarkable. Moderate chronic white matter ischemic changes are similar to the previous study. The gray-white matter differentiation is maintained. The images reviewed in bone window show no acute bony abnormality. The visualized paranasal sinuses and mastoid air cells are unremarkable. No acute intracranial abnormality. The above study was initially reviewed and reported by stat rads. I do not find any discrepancies.  Signed by Dr Olympia Lesches on 10/21/2020 6:48 AM      Pertinent Labs:   CBC:   Recent Labs     10/24/20  0246 10/25/20  0144 10/26/20  0118   WBC 6.5 4.9 5.6 tablet  Take 1 tablet by mouth daily             levETIRAcetam (KEPPRA) 500 MG tablet  Take 1 tablet by mouth daily             levoFLOXacin (LEVAQUIN) 500 MG tablet  Take 1 tablet by mouth every 48 hours for 2 doses             levothyroxine (SYNTHROID) 150 MCG tablet  Take 150 mcg by mouth Daily             linaclotide (LINZESS) 290 MCG CAPS capsule  Take 1 capsule by mouth every morning (before breakfast)             lisinopril (PRINIVIL;ZESTRIL) 20 MG tablet  Take 1 tablet by mouth 2 times daily             metoprolol succinate (TOPROL XL) 100 MG extended release tablet  Take 1 tablet by mouth every morning (before breakfast)             nicotine (NICODERM CQ) 14 MG/24HR  Place 1 patch onto the skin daily for 14 days             NIFEdipine (ADALAT CC) 60 MG extended release tablet  Take 60 mg by mouth daily             ondansetron (ZOFRAN-ODT) 4 MG disintegrating tablet  Take 4 mg by mouth every 8 hours as needed for Nausea or Vomiting             rOPINIRole (REQUIP) 2 MG tablet  Take 4 mg by mouth nightly Indications: Restless Leg Syndrome              senna (SENNA-LAX) 8.6 MG tablet  Take 1 tablet by mouth daily             sevelamer (RENVELA) 800 MG tablet  Take 1 tablet by mouth 3 times daily (with meals)             simvastatin (ZOCOR) 40 MG tablet  Take 40 mg by mouth nightly                  Hospital Follow-up:  Next dialysis is due on 10/27. PCP for blood sugar management. Diet: No diet orders on file   Activity: Resume as tolerated     Disposition: Patient is medically stable and will be discharged home. Time spent on discharge > 35 minutes. Signed:   Wanda Noble MD

## 2020-10-26 NOTE — PROGRESS NOTES
Patient:   Ketan White  MR#:    [id number]   Room:    0308/308-01   YOB: 1970  Date of Progress Note: 10/26/2020  Time of Note                           12:09 PM  Consulting Physician:   Jp Randle M.D. Attending Physician: Camron Lin MD     Chief complaint seizure AMS    S:This 45 y.o. female  with a past medical history significant for uncontrolled diabetes, HTN and ESRD on hemodialysis. The history is obtained from the chart and nursing as the patient is unable to provide a history due to her mental status. The patient was admitted to the hospital with altered mental status with seizure x 2, HTN urgency with blood pressure up to 136 systolic and severely elevated blood sugar> 1000. She has had pervious admissions to the hospital with hyperglycemia and HTN urgency with no significant change in her mental status. On the morning of admission the family went to wake her and they were unable to do so. In the Er she was confused and witnessed to have 2 brief seizures and given ativan and Keppra and transferred to ICU where she continued to be stuporous. No recent illnesses as per nursing. Afebrile with a normal white count of 5. 4. Feeling better and wants to go home.     REVIEW OF SYSTEMS:  Constitutional: No fevers No chills  Neck:No stiffness  Respiratory: No shortness of breath  Cardiovascular: No chest pain No palpitations  Gastrointestinal: No abdominal pain    Genitourinary: No Dysuria  Neurological: No headache, no confusion    Past Medical History:      Diagnosis Date    Arthritis     Chronic kidney disease, stage 5, kidney failure (HCC)     Closed fracture of right shoulder girdle, with routine healing, subsequent encounter     DM (diabetes mellitus) type II controlled with [facility] 06/1993    Gastroparesis     GERD (gastroesophageal reflux disease)     Hemodialysis patient ([facility]     dialysis on tues, thur, and sat at [facility]    Hypertension     Hypothyroid     Nasal congestion     recent    Noncompliance with medications     Palliative care patient 10/08/2019    Restless leg syndrome        Past Surgical History:      Procedure Laterality Date    BREAST SURGERY      infected milk gland removed, 2004    CHOLECYSTECTOMY, LAPAROSCOPIC      2008    COLONOSCOPY Left 9/17/2020    Dr Peg Araya hepatic flexure-Severe tortuosity with severe spasming, 1 yr recall    DIALYSIS FISTULA CREATION Left 1/25/2019    REVISION LEFT UPPER EXTREMITY AV ACCESS WITH LEFT BRACHIAL ARTERY TO LEFT AXILLARY VEIN WITH  INTERPOSITIONAL ARTEGRAFT   performed by Josephine Garcia MD at [address]  2012    [address] Right 1/25/2019    INSERTION OF RIGHT INTERNAL JUGULAR HEMODIALYSIS CATHETER performed by Josephine Garcia MD at [address] [facility]  08/17/2018    1.  Moderately increased stainable iron identified by special stain in Kupffer cells and occasional hepatocytes. Negative for evidence of significant portal or lobular inflammation. Negative for evidence of significant fibrosis    ME COLONOSCOPY W/BIOPSY SINGLE/MULTIPLE N/A 10/20/2017    Dr Tristan Mancini prep-Tubular AP (-) dysplasia x 2--3 yr recall    ME EGD TRANSORAL BIOPSY SINGLE/MULTIPLE N/A 12/27/2016    Dr Leandor Hill EGD TRANSORAL BIOPSY SINGLE/MULTIPLE N/A 10/20/2017    Dr Caitlin Phillips (-)   [address] [facility] VASCULAR SURGERY Left 2016    fistula by Dr Parley Aase at [address]  10/02/2017    [facility]. Left upper fistulograms/venograms, balloon angioplasty cephalic vein arch 51I08 conquest.    VASCULAR SURGERY  08/2018    FISTULOGRAM    VASCULAR SURGERY  10/29/2018    [facility]. Left upper fistulograms/venograms    VASCULAR SURGERY  12/19/2018    [facility]. Arch aortogram,left upper arteriograms.  VASCULAR SURGERY  03/06/2019    [facility]. Removal of tunneled dialyis catheter right internal jugular vein.  VASCULAR SURGERY  08/28/2019    [facility]. Left upper extremity fistulogram including venography to the superior vena cava. Balloon angioplasty left subclavian vein with 10mm x 40mm conquest balloon. Balloon angioplasty mid/proximal upper arm cephalic vein with 50MS x 40mm conquest balloon. Venograms after balloon angioplasty. Stent left mid/proximal upper arm cephalic vein stenosis fluency 10mm x 60mm self-expanding covered stent. Balloon     VASCULAR SURGERY  08/28/2019    cont angioplasty stent with 10mm x 40mm conquest balloon. Completion venograms left upper extremity.        Medications in Hospital:      Current Facility-Administered Medications:     insulin glargine (LANTUS) injection vial 15 Units, 15 Units, Subcutaneous, Nightly, Lucian Hidalgo MD, 15 Units at 10/25/20 2241    cloNIDine (CATAPRES) 0.3 MG/24HR 1 patch, 1 patch, Transdermal, Weekly, Karlene Hadley MD, 1 patch at 10/24/20 0556    labetalol (NORMODYNE;TRANDATE) injection 20 mg, 20 mg, Intravenous, Q4H PRN, Lucian Hidalgo MD, 20 mg at 10/26/20 1128    enalaprilat (VASOTEC) injection 1.25 mg, 1.25 mg, Intravenous, Q6H PRN, Lucian Hidalgo MD, 1.25 mg at 10/26/20 1038    levoFLOXacin (LEVAQUIN) 500 MG/100ML infusion 500 mg, 500 mg, Intravenous, Q48H, Lucian Hidalgo MD    hydrALAZINE (APRESOLINE) tablet 75 mg, 75 mg, Oral, 3 times per day, Lucian Hidalgo MD, 75 mg at 10/26/20 1681    labetalol (NORMODYNE;TRANDATE) injection 10 mg, 10 mg, Intravenous, Once, Lucian Hidalgo MD    nitroGLYCERIN (NITROSTAT) SL tablet 0.4 mg, 0.4 mg, Sublingual, Q5 Min PRN, Lucian Hidalgo MD    [facility]etron [facility]) injection 4 mg, 4 mg, Intravenous, Q6H PRN, Karlene Hadley MD, 4 mg at 10/24/20 1218    metoprolol succinate (TOPROL XL) extended release tablet 100 mg, 100 mg, Oral, Daily, Lucian Hidalgo MD, 100 mg at 10/26/20 0852    lisinopril (PRINIVIL;ZESTRIL) tablet 20 mg, 20 mg, Oral, BID, Lucian Hidalgo MD, 20 mg at 10/26/20 0852    NIFEdipine (PROCARDIA XL) extended release tablet 60 mg, 60 mg, Oral, Daily, Nica Patricio MD, 60 mg at 10/26/20 5701    isosorbide mononitrate (IMDUR) extended release tablet 120 mg, 120 mg, Oral, Daily, Nica Patricio MD, 120 mg at 10/26/20 0853    glucose (GLUTOSE) 40 % oral gel 15 g, 15 g, Oral, PRN, Nica Patricio MD    dextrose 50 % IV solution, 12.5 g, Intravenous, PRN, Nica Patricio MD, 12.5 g at 10/22/20 2128    glucagon (rDNA) injection 1 mg, 1 mg, Intramuscular, PRN, Nica Patricio MD    dextrose 5 % solution, 100 mL/hr, Intravenous, PRN, Nica Patricio MD    albuterol (PROVENTIL) nebulizer solution 2.5 mg, 2.5 mg, Nebulization, Q4H PRN, Nica Patricio MD    dextrose 50 % IV solution, 12.5 g, Intravenous, PRN, Nica Patricio MD, 12.5 g at 10/22/20 1225    potassium chloride 10 mEq/100 mL IVPB (Peripheral Line), 10 mEq, Intravenous, PRN, Nica Patricio MD, Last Rate: 100 mL/hr at 10/21/20 1221, 10 mEq at 10/21/20 1221    magnesium sulfate 1 g in dextrose 5% 100 mL IVPB, 1 g, Intravenous, PRN, Nica Patricio MD    sodium phosphate 10 mmol in dextrose 5 % 250 mL IVPB, 10 mmol, Intravenous, PRN, Stopped at 10/22/20 0940 **OR** sodium phosphate 15 mmol in dextrose 5 % 250 mL IVPB, 15 mmol, Intravenous, PRN **OR** sodium phosphate 20 mmol in dextrose 5 % 500 mL IVPB, 20 mmol, Intravenous, PRN, Nica Patricio MD, Stopped at 10/21/20 2147    heparin (porcine) injection 5,000 Units, 5,000 Units, Subcutaneous, Q8H, Nica Patricio MD, 5,000 Units at 10/26/20 0853    insulin lispro (HUMALOG) injection vial 0-18 Units, 0-18 Units, Subcutaneous, TID WC, Nica Patricio MD, 9 Units at 10/26/20 0853    insulin lispro (HUMALOG) injection vial 0-9 Units, 0-9 Units, Subcutaneous, Nightly, Nica Patricio MD, 3 Units at 10/25/20 0915    Allergies:  Penicillins; Lamisil advanced [terbinafine]; Stadol [butorphanol]; and Reglan [metoclopramide]    Social History:   TOBACCO:   reports that she has been smoking cigarettes. She has a 66.00 pack-year smoking history. She has never used smokeless tobacco.  ETOH:   reports no history of alcohol use. Family History:       Problem Relation Age of Onset    Colon Cancer Mother          at 63    Colon Polyps Mother     Lung Cancer Father          at 66    Colon Polyps Father     Stomach Cancer Sister     Breast Cancer Sister     Depression Daughter 25        committed suicide    Liver Cancer Neg Hx     Liver Disease Neg Hx     Esophageal Cancer Neg Hx     Rectal Cancer Neg Hx          PHYSICAL EXAM:  BP (!) 181/84   Pulse 80   Temp 97 °F (36.1 °C) (Temporal)   Resp 14   Ht 5' 7\" (1.702 m)   Wt 164 lb 9.6 oz (74.7 kg)   SpO2 97%   BMI 25.78 kg/m²     Constitutional - well developed, well nourished. Eyes - conjunctiva normal.   Ear, nose, throat - No scars, masses, or lesions over external nose or ears, no atrophy of tongue  Neck-symmetric, no masses noted, no jugular vein distension  Respiration- chest wall appears symmetric, good expansion,   normal effort without use of accessory muscles  Musculoskeletal - no significant wasting of muscles noted, no bony deformities  Extremities-no clubbing, cyanosis or edema  Skin - warm, dry, and intact. No rash, erythema, or pallor. Psychiatric - mood, affect, and behavior appear normal.      Neurological exam  Awake, alert, fluent oriented x 3 appropriate affect  Attention and concentration appear appropriate  Recent and remote memory appears unremarkable  Speech normal without dysarthria  No clear issues with language of fund of knowledge     Cranial Nerve Exam     CN III, IV,VI-EOMI, No nystagmus, conjugate eye movements, no ptosis    CN VII-no facial assymetry       Motor Exam  antigravity throughout upper and lower extremities bilaterally      Tremors- no tremors in hands or head noted     Gait  Not tested      Nursing/pcp notes, imaging,labs and vitals reviewed.      PT,OT and/or speech notes reviewed    Lab Results   Component Value Date    WBC 5.6 10/26/2020 HGB 8.4 (L) 10/26/2020    HCT 25.7 (L) 10/26/2020    MCV 98.1 10/26/2020     (L) 10/26/2020     Lab Results   Component Value Date     (L) 10/26/2020    K 5.3 (H) 10/26/2020    CL 90 (L) 10/26/2020    CO2 24 10/26/2020    BUN 46 (H) 10/26/2020    CREATININE 4.1 (H) 10/26/2020    GLUCOSE 262 (H) 10/26/2020    CALCIUM 8.9 10/26/2020    PROT 7.1 10/21/2020    LABALBU 4.2 10/21/2020    BILITOT 0.3 10/21/2020    ALKPHOS 119 (H) 10/21/2020    AST 25 10/21/2020    ALT 14 10/21/2020    LABGLOM 11 (A) 10/26/2020    GFRAA 14 (L) 10/26/2020     Lab Results   Component Value Date    INR 1.07 06/24/2020    INR 1.10 10/08/2019    INR 1.03 01/25/2019    PROTIME 13.8 06/24/2020    PROTIME 13.6 10/08/2019    PROTIME 12.9 01/25/2019             RECORD REVIEW: Previous medical records, medications were reviewed at today's visit    IMPRESSION:   Seizure x 2/ altered mental status-most likely triggered by severe hyperglycemia and HTN urgency-much improved  Hyponatremia/renal failure/anemia are contributing factors     Initial exam stuporous post seizure x 2/ativan and Keppra  EEG mildly slow no epileptiform activity noted  CT head no acute changes     DC Keppra as seizures most likely provoked     HTN urgency- improved  Severe Hyperglycemia-on insulin   DVT prophylaxis-Lovenox  ESRD-HD     Supportive care    Ok to DC from neuro standpoint  F/u PRN         CALL WITH ANY QUESTIONS  [phone] CELL  Dr Santa Galvan

## 2020-10-27 LAB — MRSA CULTURE ONLY: NORMAL

## 2020-12-27 ENCOUNTER — HOSPITAL ENCOUNTER (EMERGENCY)
Age: 50
Discharge: HOME | End: 2020-12-27
Payer: MEDICARE

## 2020-12-27 ENCOUNTER — APPOINTMENT (OUTPATIENT)
Dept: GENERAL RADIOLOGY | Age: 50
End: 2020-12-27
Payer: MEDICARE

## 2020-12-27 VITALS
WEIGHT: 140 LBS | TEMPERATURE: 98.2 F | BODY MASS INDEX: 22.5 KG/M2 | DIASTOLIC BLOOD PRESSURE: 74 MMHG | RESPIRATION RATE: 18 BRPM | HEIGHT: 66 IN | SYSTOLIC BLOOD PRESSURE: 156 MMHG | HEART RATE: 76 BPM | OXYGEN SATURATION: 96 %

## 2020-12-27 LAB
ALBUMIN SERPL-MCNC: 4.2 G/DL (ref 3.5–5.2)
ALP BLD-CCNC: 159 U/L (ref 35–104)
ALT SERPL-CCNC: 12 U/L (ref 5–33)
ANION GAP SERPL CALCULATED.3IONS-SCNC: 15 MMOL/L (ref 7–19)
AST SERPL-CCNC: 17 U/L (ref 5–32)
BASOPHILS ABSOLUTE: 0.1 K/UL (ref 0–0.2)
BASOPHILS RELATIVE PERCENT: 1.1 % (ref 0–1)
BILIRUB SERPL-MCNC: 0.3 MG/DL (ref 0.2–1.2)
BUN BLDV-MCNC: 20 MG/DL (ref 6–20)
CALCIUM SERPL-MCNC: 9.3 MG/DL (ref 8.6–10)
CHLORIDE BLD-SCNC: 86 MMOL/L (ref 98–111)
CO2: 29 MMOL/L (ref 22–29)
CREAT SERPL-MCNC: 2.5 MG/DL (ref 0.5–0.9)
D DIMER: 0.66 UG/ML FEU (ref 0–0.48)
EOSINOPHILS ABSOLUTE: 0.1 K/UL (ref 0–0.6)
EOSINOPHILS RELATIVE PERCENT: 1.8 % (ref 0–5)
GFR AFRICAN AMERICAN: 24
GFR NON-AFRICAN AMERICAN: 20
GLUCOSE BLD-MCNC: 396 MG/DL (ref 74–109)
HCT VFR BLD CALC: 39.8 % (ref 37–47)
HEMOGLOBIN: 13.1 G/DL (ref 12–16)
IMMATURE GRANULOCYTES #: 0 K/UL
INR BLD: 0.91 (ref 0.88–1.18)
LYMPHOCYTES ABSOLUTE: 0.7 K/UL (ref 1.1–4.5)
LYMPHOCYTES RELATIVE PERCENT: 12.3 % (ref 20–40)
MCH RBC QN AUTO: 30.8 PG (ref 27–31)
MCHC RBC AUTO-ENTMCNC: 32.9 G/DL (ref 33–37)
MCV RBC AUTO: 93.6 FL (ref 81–99)
MONOCYTES ABSOLUTE: 0.4 K/UL (ref 0–0.9)
MONOCYTES RELATIVE PERCENT: 7.4 % (ref 0–10)
NEUTROPHILS ABSOLUTE: 4.3 K/UL (ref 1.5–7.5)
NEUTROPHILS RELATIVE PERCENT: 77.2 % (ref 50–65)
PDW BLD-RTO: 12.5 % (ref 11.5–14.5)
PLATELET # BLD: 154 K/UL (ref 130–400)
PMV BLD AUTO: 9.4 FL (ref 9.4–12.3)
POTASSIUM REFLEX MAGNESIUM: 3.9 MMOL/L (ref 3.5–5)
PROTHROMBIN TIME: 12.1 SEC (ref 12–14.6)
RBC # BLD: 4.25 M/UL (ref 4.2–5.4)
SODIUM BLD-SCNC: 130 MMOL/L (ref 136–145)
TOTAL PROTEIN: 8.2 G/DL (ref 6.6–8.7)
WBC # BLD: 5.5 K/UL (ref 4.8–10.8)

## 2020-12-27 PROCEDURE — 85025 COMPLETE CBC W/AUTO DIFF WBC: CPT

## 2020-12-27 PROCEDURE — 99999 PR OFFICE/OUTPT VISIT,PROCEDURE ONLY: CPT | Performed by: NURSE PRACTITIONER

## 2020-12-27 PROCEDURE — 6360000002 HC RX W HCPCS: Performed by: NURSE PRACTITIONER

## 2020-12-27 PROCEDURE — 73502 X-RAY EXAM HIP UNI 2-3 VIEWS: CPT

## 2020-12-27 PROCEDURE — 80053 COMPREHEN METABOLIC PANEL: CPT

## 2020-12-27 PROCEDURE — 96374 THER/PROPH/DIAG INJ IV PUSH: CPT

## 2020-12-27 PROCEDURE — 99283 EMERGENCY DEPT VISIT LOW MDM: CPT

## 2020-12-27 PROCEDURE — 93971 EXTREMITY STUDY: CPT

## 2020-12-27 PROCEDURE — 36415 COLL VENOUS BLD VENIPUNCTURE: CPT

## 2020-12-27 PROCEDURE — 85379 FIBRIN DEGRADATION QUANT: CPT

## 2020-12-27 PROCEDURE — 85610 PROTHROMBIN TIME: CPT

## 2020-12-27 PROCEDURE — 72100 X-RAY EXAM L-S SPINE 2/3 VWS: CPT

## 2020-12-27 RX ORDER — HYDROCODONE BITARTRATE AND ACETAMINOPHEN 5; 325 MG/1; MG/1
1 TABLET ORAL EVERY 6 HOURS PRN
Qty: 12 TABLET | Refills: 0 | Status: SHIPPED | OUTPATIENT
Start: 2020-12-27 | End: 2020-12-30

## 2020-12-27 RX ORDER — MORPHINE SULFATE 4 MG/ML
4 INJECTION, SOLUTION INTRAMUSCULAR; INTRAVENOUS ONCE
Status: COMPLETED | OUTPATIENT
Start: 2020-12-27 | End: 2020-12-27

## 2020-12-27 RX ADMIN — MORPHINE SULFATE 4 MG: 4 INJECTION, SOLUTION INTRAMUSCULAR; INTRAVENOUS at 12:21

## 2020-12-27 ASSESSMENT — PAIN SCALES - GENERAL: PAINLEVEL_OUTOF10: 5

## 2020-12-27 NOTE — ED NOTES
Bed: 07  Expected date:   Expected time:   Means of arrival:   Comments:  cristian Beckwith RN  12/27/20 5912

## 2020-12-27 NOTE — PROGRESS NOTES
Vascular lab preliminary. Left leg venous scan completed. No evidence for DVT, SVT, or reflux noted at this time. Final report pending.

## 2020-12-27 NOTE — ED PROVIDER NOTES
[facility] EMERGENCY DEPT  eMERGENCY dEPARTMENT eNCOUnter      Pt Name: Octavio Rosas  MRN: [id number]  [place] 1970  Date of evaluation: 12/27/2020  Provider: Rowena Galeano, [address]       Chief Complaint   Patient presents with    Leg Pain     presents with c/o lt leg pain          HISTORY OF PRESENT ILLNESS   (Location/Symptom, Timing/Onset,Context/Setting, Quality, Duration, Modifying Factors, Severity)  Note limiting factors. Zafar Orourke a 45 y.o. female who presents to the emergency department for evaluation of leg pain. Pt tells me that she has had left upper leg pain over past 2-3 months with worsening over past 2 weeks associated with left sided low back and hip pain. She denies history of dvt. She relates that she has numbness of left leg over past few months. She has been using a walker due to left leg pain and numbness over past several weeks. She denies fevers, abdominal pain as well as saddle anesthesia. She has had no changes in bowel or bladder. She denies previous evaluation for similar problems. She related that she discussed problem with dialysis nurse today who urged her to be evaluated. She tells me that she received usual hemodialysis treatment today giving history of ESRD. She has had no chest pain or shortness of breath. HPI    Nursing Notes were reviewed. REVIEW OF SYSTEMS    (2-9 systems for level 4, 10 or more for level 5)     Review of Systems   Musculoskeletal: Positive for myalgias (left leg). Neurological: Positive for numbness (left leg). All other systems reviewed and are negative. A complete review of systems was performed and is negative except as noted above in the HPI.        PAST MEDICAL HISTORY     Past Medical History:   Diagnosis Date    Arthritis     Chronic kidney disease, stage 5, kidney failure (HCC)     Closed fracture of right shoulder girdle, with routine healing, subsequent encounter     DM (diabetes mellitus) type II controlled with renal manifestation ([facility] [address] 06/1993    Gastroparesis     GERD (gastroesophageal reflux disease)     Hemodialysis patient ([facility] [address]     dialysis on tues, thur, and sat at [facility]    Hypertension     Hypothyroid     Nasal congestion     recent    Noncompliance with medications     Palliative care patient 10/08/2019    Restless leg syndrome          SURGICAL HISTORY       Past Surgical History:   Procedure Laterality Date    BREAST SURGERY      infected milk gland removed, 2004    CHOLECYSTECTOMY, LAPAROSCOPIC      2008    COLONOSCOPY Left 9/17/2020    Dr Duane Azul hepatic flexure-Severe tortuosity with severe spasming, 1 yr recall    DIALYSIS FISTULA CREATION Left 1/25/2019    REVISION LEFT UPPER EXTREMITY AV ACCESS WITH LEFT BRACHIAL ARTERY TO LEFT AXILLARY VEIN WITH  INTERPOSITIONAL ARTEGRAFT   performed by Dilip Sylvester MD at [address]  2012    [address] Right 1/25/2019    INSERTION OF RIGHT INTERNAL JUGULAR HEMODIALYSIS CATHETER performed by Dilip Sylvester MD at [facility] [facility]  08/17/2018    1.  Moderately increased stainable iron identified by special stain in Kupffer cells and occasional hepatocytes. Negative for evidence of significant portal or lobular inflammation. Negative for evidence of significant fibrosis    [facility] COLONOSCOPY W/BIOPSY SINGLE/MULTIPLE N/A 10/20/2017    Dr Venkata Goode prep-Tubular AP (-) dysplasia x 2--3 yr recall    [facility] EGD TRANSORAL BIOPSY SINGLE/MULTIPLE N/A 12/27/2016    Dr Brown Cooks EGD TRANSORAL BIOPSY SINGLE/MULTIPLE N/A 10/20/2017    Dr Jaspal Martin (-)   [address] [facility] VASCULAR SURGERY Left 2016    fistula by Dr Denny Cat at [address]  10/02/2017    [facility]. Left upper fistulograms/venograms, balloon angioplasty cephalic vein arch [id number] conquest.    VASCULAR SURGERY  08/2018    FISTULOGRAM    VASCULAR SURGERY  10/29/2018    [facility]. Left upper fistulograms/venograms    VASCULAR SURGERY  12/19/2018    [facility]. Arch aortogram,left upper arteriograms.  VASCULAR SURGERY  03/06/2019    [facility]. Removal of tunneled dialyis catheter right internal jugular vein.  VASCULAR SURGERY  08/28/2019    [facility]. Left upper extremity fistulogram including venography to the superior vena cava. Balloon angioplasty left subclavian vein with 10mm x 40mm conquest balloon. Balloon angioplasty mid/proximal upper arm cephalic vein with 72PO x 40mm conquest balloon. Venograms after balloon angioplasty. Stent left mid/proximal upper arm cephalic vein stenosis fluency 10mm x 60mm self-expanding covered stent. Balloon     VASCULAR SURGERY  08/28/2019    cont angioplasty stent with 10mm x 40mm conquest balloon. Completion venograms left upper extremity.          CURRENT MEDICATIONS       Previous Medications    ALBUTEROL SULFATE  (90 BASE) MCG/ACT INHALER    Inhale 2 puffs into the lungs every 4 hours as needed    CLONIDINE (CATAPRES) 0.3 MG TABLET    Take 1 tablet by mouth every 8 hours    DULOXETINE (CYMBALTA) 60 MG EXTENDED RELEASE CAPSULE    Take 60 mg by mouth daily    ERGOCALCIFEROL (VITAMIN D2) 10 MCG (400 UNIT) TABS    Take 1.25 mg by mouth every 14 days    HYDRALAZINE (APRESOLINE) 50 MG TABLET    Take 1.5 tablets by mouth every 8 hours    HYDROCHLOROTHIAZIDE (HYDRODIURIL) 25 MG TABLET    Take 2 tablets by mouth daily    INSULIN ASPART (NOVOLOG FLEXPEN) 100 UNIT/ML INJECTION PEN    Inject 5 Units into the skin 3 times daily     INSULIN GLARGINE (LANTUS SOLOSTAR) 100 UNIT/ML INJECTION PEN    Inject 26 Units into the skin daily    ISOSORBIDE MONONITRATE (IMDUR) 120 MG EXTENDED RELEASE TABLET    Take 1 tablet by mouth daily    LEVETIRACETAM (KEPPRA) 500 MG TABLET    Take 1 tablet by mouth daily    LEVOTHYROXINE (SYNTHROID) 150 MCG TABLET    Take 150 mcg by mouth Daily    LINACLOTIDE (LINZESS) 290 MCG CAPS CAPSULE    Take 1 capsule by mouth every morning (before breakfast)    LISINOPRIL (PRINIVIL;ZESTRIL) 20 MG TABLET at age 24 and has averaged 2 PPD   Substance and Sexual Activity    Alcohol use: No    Drug use: Not Currently     Types: Marijuana    Sexual activity: Not Currently     Partners: Male   Lifestyle    Physical activity     Days per week: None     Minutes per session: None    Stress: None   Relationships    Social connections     Talks on phone: None     Gets together: None     Attends [employer] service: None     Active member of club or organization: None     Attends meetings of clubs or organizations: None     Relationship status: None    Intimate partner violence     Fear of current or ex partner: None     Emotionally abused: None     Physically abused: None     Forced sexual activity: None   Other Topics Concern    None   Social History Narrative    None       SCREENINGS    [place] Coma Scale  Eye Opening: Spontaneous  Best Verbal Response: Oriented  Best Motor Response: Obeys commands  [place] Coma Scale Score: 15        PHYSICAL EXAM    (up to 7 for level 4, 8 or more for level 5)     ED Triage Vitals [12/27/20 1140]   BP Temp Temp src Pulse Resp SpO2 Height Weight   (!) 163/75 98.2 °F (36.8 °C) -- 74 18 95 % 5' 6\" (1.676 m) 140 lb (63.5 kg)       Physical Exam  Vitals signs reviewed. HENT:      Head: Normocephalic. Right Ear: External ear normal.      Left Ear: External ear normal.   Eyes:      Conjunctiva/sclera: Conjunctivae normal.      Pupils: Pupils are equal, round, and reactive to light. Neck:      Musculoskeletal: Normal range of motion. Cardiovascular:      Rate and Rhythm: Normal rate and regular rhythm. Heart sounds: Normal heart sounds. Pulmonary:      Effort: Pulmonary effort is normal.      Breath sounds: Normal breath sounds. Abdominal:      General: Bowel sounds are normal.      Palpations: Abdomen is soft. Musculoskeletal: Normal range of motion.       Comments: Painful flexion/extension of left hip  No obvious swelling of left lower extremity  Compartments of left lower extremity are soft  CMS intact bilateral lower extremities  No direct ttp lumbar spine   Skin:     General: Skin is warm and dry. Neurological:      Mental Status: She is alert and oriented to person, place, and time. DIAGNOSTIC RESULTS     EKG: All EKG's are interpreted by the Emergency Department Physician who either signs or Co-signs this chart in the absence of acardiologist.        RADIOLOGY:   Non-plain film images such as CT, Ultrasound andMRI are read by the radiologist. Plain radiographic images are visualized and preliminarily interpreted by the emergency physician with the below findings:        Interpretation per the Radiologist below, if available at the time of this note:    XR LUMBAR SPINE (2-3 VIEWS)   Final Result   No acute osseous abnormality. Signed by Dr Loly Bradford on 12/27/2020 12:40 PM      XR HIP 2-3 VW W PELVIS LEFT   Final Result   No acute osseous abnormality. Signed by Dr Loly Rodriguez.  Sanchez on 12/27/2020 12:38 PM      VL Extremity Venous Left               ED BEDSIDE ULTRASOUND:   Performed by ED Physician - none    LABS:  Labs Reviewed   CBC WITH AUTO DIFFERENTIAL - Abnormal; Notable for the following components:       Result Value    MCHC 32.9 (*)     Neutrophils % 77.2 (*)     Lymphocytes % 12.3 (*)     Basophils % 1.1 (*)     Lymphocytes Absolute 0.7 (*)     All other components within normal limits   COMPREHENSIVE METABOLIC PANEL W/ REFLEX TO MG FOR LOW K - Abnormal; Notable for the following components:    Sodium 130 (*)     Chloride 86 (*)     Glucose 396 (*)     CREATININE 2.5 (*)     GFR Non- 20 (*)     GFR African American 24 (*)     Alkaline Phosphatase 159 (*)     All other components within normal limits   D-DIMER, QUANTITATIVE - Abnormal; Notable for the following components:    D-Dimer, Quant 0.66 (*)     All other components within normal limits   PROTIME-INR       All other labs were within normal range or not returned as of this dictation. RE-ASSESSMENT           EMERGENCY DEPARTMENT COURSE and DIFFERENTIALDIAGNOSIS/MDM:   Vitals:    Vitals:    12/27/20 1140 12/27/20 1300   BP: (!) 163/75 (!) 156/74   Pulse: 74 76   Resp: 18 18   Temp: 98.2 °F (36.8 °C)    SpO2: 95% 96%   Weight: 140 lb (63.5 kg)    Height: 5' 6\" (1.676 m)        MDM      CONSULTS:  None    PROCEDURES:  Unless otherwise notedbelow, none     Procedures    FINAL IMPRESSION     1. Left leg pain    2. Sciatica of left side          DISPOSITION/PLAN   DISPOSITION        PATIENT REFERRED TO:  Yaneth Barrera MD  [address] [place] [address]  [phone]    Schedule an appointment as soon as possible for a visit         DISCHARGE MEDICATIONS:    Attestation: The Prescription Monitoring Report for this patient was reviewed today. ([id number] ) ROMINA Daniel)  Periodic Controlled Substance Monitoring: Possible medication side effects, risk of tolerance/dependence & alternative treatments discussed., No signs of potential drug abuse or diversion identified. ROMINA Daniel)  Current Discharge Medication List           Medication List      START taking these medications    HYDROcodone-acetaminophen 5-325 MG per tablet  Commonly known as: Norco  Take 1 tablet by mouth every 6 hours as needed for Pain for up to 3 days. Intended supply: 3 days.  Take lowest dose possible to manage pain        ASK your doctor about these medications    albuterol sulfate  (90 Base) MCG/ACT inhaler     cloNIDine 0.3 MG tablet  Commonly known as: CATAPRES  Take 1 tablet by mouth every 8 hours     DULoxetine 60 MG extended release capsule  Commonly known as: CYMBALTA     hydrALAZINE 50 MG tablet  Commonly known as: APRESOLINE  Take 1.5 tablets by mouth every 8 hours     hydroCHLOROthiazide 25 MG tablet  Commonly known as: HYDRODIURIL  Take 2 tablets by mouth daily     isosorbide mononitrate 120 MG extended release tablet  Commonly known as: IMDUR  Take 1 tablet by mouth daily     Lantus SoloStar 100 UNIT/ML injection pen  Generic drug: insulin glargine  Inject 26 Units into the skin daily     levETIRAcetam 500 MG tablet  Commonly known as: KEPPRA  Take 1 tablet by mouth daily     levothyroxine 150 MCG tablet  Commonly known as: SYNTHROID     linaclotide 290 MCG Caps capsule  Commonly known as: LINZESS  Take 1 capsule by mouth every morning (before breakfast)     lisinopril 20 MG tablet  Commonly known as: PRINIVIL;ZESTRIL  Take 1 tablet by mouth 2 times daily     metoprolol succinate 100 MG extended release tablet  Commonly known as: TOPROL XL  Take 1 tablet by mouth every morning (before breakfast)     nicotine 14 MG/24HR  Commonly known as: NICODERM CQ  Place 1 patch onto the skin daily for 14 days     NIFEdipine 60 MG extended release tablet  Commonly known as: ADALAT CC     NovoLOG FlexPen 100 UNIT/ML injection pen  Generic drug: insulin aspart     ondansetron 4 MG disintegrating tablet  Commonly known as: ZOFRAN-ODT     rOPINIRole 2 MG tablet  Commonly known as: REQUIP     Senna-Lax 8.6 MG tablet  Generic drug: senna     sevelamer 800 MG tablet  Commonly known as: RENVELA     simvastatin 40 MG tablet  Commonly known as: ZOCOR     Vitamin D2 10 MCG (400 UNIT) Tabs           Where to Get Your Medications      You can get these medications from any pharmacy    Bring a paper prescription for each of these medications  · HYDROcodone-acetaminophen 5-325 MG per tablet           (Pleasenote that portions of this note were completed with a voice recognition program.  Efforts were made to edit the dictations but occasionally words are mis-transcribed.)              Walter Schrader, APRN  12/27/20 [id number]

## 2021-01-01 ENCOUNTER — READMISSION MANAGEMENT (OUTPATIENT)
Dept: CALL CENTER | Facility: HOSPITAL | Age: 51
End: 2021-01-01

## 2021-01-01 ENCOUNTER — APPOINTMENT (OUTPATIENT)
Dept: GENERAL RADIOLOGY | Facility: HOSPITAL | Age: 51
End: 2021-01-01

## 2021-01-01 ENCOUNTER — APPOINTMENT (OUTPATIENT)
Dept: CT IMAGING | Facility: HOSPITAL | Age: 51
End: 2021-01-01

## 2021-01-01 ENCOUNTER — LAB REQUISITION (OUTPATIENT)
Dept: LAB | Facility: HOSPITAL | Age: 51
End: 2021-01-01

## 2021-01-01 ENCOUNTER — LAB (OUTPATIENT)
Dept: LAB | Facility: HOSPITAL | Age: 51
End: 2021-01-01

## 2021-01-01 ENCOUNTER — TRANSCRIBE ORDERS (OUTPATIENT)
Dept: ADMINISTRATIVE | Facility: HOSPITAL | Age: 51
End: 2021-01-01

## 2021-01-01 ENCOUNTER — APPOINTMENT (OUTPATIENT)
Dept: MRI IMAGING | Facility: HOSPITAL | Age: 51
End: 2021-01-01

## 2021-01-01 ENCOUNTER — DOCUMENTATION (OUTPATIENT)
Dept: CT IMAGING | Facility: HOSPITAL | Age: 51
End: 2021-01-01

## 2021-01-01 ENCOUNTER — APPOINTMENT (OUTPATIENT)
Dept: ULTRASOUND IMAGING | Facility: HOSPITAL | Age: 51
End: 2021-01-01

## 2021-01-01 ENCOUNTER — HOSPITAL ENCOUNTER (INPATIENT)
Facility: HOSPITAL | Age: 51
LOS: 7 days | Discharge: NURSING, REHAB OR LONG-TERM CARE | End: 2021-10-12
Attending: EMERGENCY MEDICINE | Admitting: INTERNAL MEDICINE

## 2021-01-01 ENCOUNTER — ANESTHESIA EVENT (OUTPATIENT)
Dept: PERIOP | Facility: HOSPITAL | Age: 51
End: 2021-01-01

## 2021-01-01 ENCOUNTER — HOSPITAL ENCOUNTER (OUTPATIENT)
Facility: HOSPITAL | Age: 51
Setting detail: HOSPITAL OUTPATIENT SURGERY
Discharge: HOME | End: 2021-08-30
Attending: ORTHOPAEDIC SURGERY | Admitting: ORTHOPAEDIC SURGERY

## 2021-01-01 ENCOUNTER — HOSPITAL ENCOUNTER (INPATIENT)
Facility: HOSPITAL | Age: 51
LOS: 10 days | Discharge: NURSING, REHAB OR LONG-TERM CARE | End: 2021-06-29
Attending: INTERNAL MEDICINE | Admitting: INTERNAL MEDICINE

## 2021-01-01 ENCOUNTER — TRANSCRIBE ORDERS (OUTPATIENT)
Dept: LAB | Facility: HOSPITAL | Age: 51
End: 2021-01-01

## 2021-01-01 ENCOUNTER — ANESTHESIA (OUTPATIENT)
Dept: PERIOP | Facility: HOSPITAL | Age: 51
End: 2021-01-01

## 2021-01-01 ENCOUNTER — HOSPITAL ENCOUNTER (INPATIENT)
Facility: HOSPITAL | Age: 51
LOS: 2 days | Discharge: HOME | End: 2021-09-13
Attending: EMERGENCY MEDICINE | Admitting: INTERNAL MEDICINE

## 2021-01-01 ENCOUNTER — HOSPITAL ENCOUNTER (INPATIENT)
Facility: HOSPITAL | Age: 51
LOS: 2 days | Discharge: HOME | End: 2021-09-18
Attending: EMERGENCY MEDICINE | Admitting: FAMILY MEDICINE

## 2021-01-01 ENCOUNTER — HOSPITAL ENCOUNTER (INPATIENT)
Facility: HOSPITAL | Age: 51
LOS: 3 days | Discharge: HOME | End: 2021-08-20
Attending: EMERGENCY MEDICINE | Admitting: FAMILY MEDICINE

## 2021-01-01 VITALS
HEIGHT: 62 IN | BODY MASS INDEX: 32.92 KG/M2 | RESPIRATION RATE: 18 BRPM | HEART RATE: 63 BPM | TEMPERATURE: 97.4 F | DIASTOLIC BLOOD PRESSURE: 66 MMHG | WEIGHT: 178.9 LBS | OXYGEN SATURATION: 100 % | SYSTOLIC BLOOD PRESSURE: 154 MMHG

## 2021-01-01 VITALS
TEMPERATURE: 97.5 F | OXYGEN SATURATION: 97 % | WEIGHT: 167.55 LBS | HEART RATE: 68 BPM | SYSTOLIC BLOOD PRESSURE: 139 MMHG | HEIGHT: 65 IN | BODY MASS INDEX: 27.92 KG/M2 | RESPIRATION RATE: 18 BRPM | DIASTOLIC BLOOD PRESSURE: 62 MMHG

## 2021-01-01 VITALS
OXYGEN SATURATION: 94 % | TEMPERATURE: 98.1 F | DIASTOLIC BLOOD PRESSURE: 70 MMHG | HEIGHT: 63 IN | SYSTOLIC BLOOD PRESSURE: 168 MMHG | WEIGHT: 169 LBS | RESPIRATION RATE: 16 BRPM | HEART RATE: 64 BPM | BODY MASS INDEX: 29.95 KG/M2

## 2021-01-01 VITALS
OXYGEN SATURATION: 96 % | HEIGHT: 65 IN | RESPIRATION RATE: 20 BRPM | DIASTOLIC BLOOD PRESSURE: 92 MMHG | WEIGHT: 157.19 LBS | TEMPERATURE: 98.6 F | BODY MASS INDEX: 26.19 KG/M2 | HEART RATE: 81 BPM | SYSTOLIC BLOOD PRESSURE: 234 MMHG

## 2021-01-01 VITALS
WEIGHT: 133.1 LBS | OXYGEN SATURATION: 98 % | TEMPERATURE: 97.9 F | SYSTOLIC BLOOD PRESSURE: 137 MMHG | HEIGHT: 67 IN | HEART RATE: 64 BPM | RESPIRATION RATE: 16 BRPM | BODY MASS INDEX: 20.89 KG/M2 | DIASTOLIC BLOOD PRESSURE: 70 MMHG

## 2021-01-01 VITALS
TEMPERATURE: 98.2 F | WEIGHT: 152.6 LBS | HEIGHT: 66 IN | OXYGEN SATURATION: 93 % | BODY MASS INDEX: 24.53 KG/M2 | RESPIRATION RATE: 16 BRPM | DIASTOLIC BLOOD PRESSURE: 79 MMHG | SYSTOLIC BLOOD PRESSURE: 165 MMHG | HEART RATE: 65 BPM

## 2021-01-01 DIAGNOSIS — F11.90 CHRONIC NARCOTIC USE: ICD-10-CM

## 2021-01-01 DIAGNOSIS — N19 UREMIA: ICD-10-CM

## 2021-01-01 DIAGNOSIS — R53.1 GENERALIZED WEAKNESS: ICD-10-CM

## 2021-01-01 DIAGNOSIS — R13.10 DYSPHAGIA, UNSPECIFIED TYPE: ICD-10-CM

## 2021-01-01 DIAGNOSIS — I15.9 SECONDARY HYPERTENSION: ICD-10-CM

## 2021-01-01 DIAGNOSIS — I16.1 HYPERTENSIVE EMERGENCY: Primary | ICD-10-CM

## 2021-01-01 DIAGNOSIS — R56.9 SEIZURE (HCC): ICD-10-CM

## 2021-01-01 DIAGNOSIS — R73.9 HYPERGLYCEMIA: ICD-10-CM

## 2021-01-01 DIAGNOSIS — R73.9 HYPERGLYCEMIA: Primary | ICD-10-CM

## 2021-01-01 DIAGNOSIS — E16.2 HYPOGLYCEMIA: ICD-10-CM

## 2021-01-01 DIAGNOSIS — E11.01: ICD-10-CM

## 2021-01-01 DIAGNOSIS — E87.20 METABOLIC ACIDOSIS: ICD-10-CM

## 2021-01-01 DIAGNOSIS — E87.5 HYPERKALEMIA: ICD-10-CM

## 2021-01-01 DIAGNOSIS — R93.89 ABNORMAL CHEST X-RAY: ICD-10-CM

## 2021-01-01 DIAGNOSIS — E11.22 CKD STAGE 4 DUE TO TYPE 2 DIABETES MELLITUS (HCC): ICD-10-CM

## 2021-01-01 DIAGNOSIS — M84.48XS SACRAL INSUFFICIENCY FRACTURE, SEQUELA: ICD-10-CM

## 2021-01-01 DIAGNOSIS — R41.82 ALTERED MENTAL STATUS, UNSPECIFIED ALTERED MENTAL STATUS TYPE: Primary | ICD-10-CM

## 2021-01-01 DIAGNOSIS — D63.8 ANEMIA OF CHRONIC DISEASE: ICD-10-CM

## 2021-01-01 DIAGNOSIS — N18.5 CHRONIC RENAL FAILURE, STAGE 5 (HCC): ICD-10-CM

## 2021-01-01 DIAGNOSIS — N18.9 CHRONIC KIDNEY DISEASE, UNSPECIFIED CKD STAGE: ICD-10-CM

## 2021-01-01 DIAGNOSIS — Z74.09 IMPAIRED MOBILITY: ICD-10-CM

## 2021-01-01 DIAGNOSIS — E11.00 HYPEROSMOLAR HYPERGLYCEMIC STATE (HHS) (HCC): Primary | ICD-10-CM

## 2021-01-01 DIAGNOSIS — R10.9 ABDOMINAL PAIN, UNSPECIFIED ABDOMINAL LOCATION: Primary | ICD-10-CM

## 2021-01-01 DIAGNOSIS — E11.43 GASTROPARESIS DUE TO DM (HCC): ICD-10-CM

## 2021-01-01 DIAGNOSIS — Z78.9 DECREASED ACTIVITIES OF DAILY LIVING (ADL): ICD-10-CM

## 2021-01-01 DIAGNOSIS — E13.649 UNCONTROLLED DIABETES MELLITUS OF OTHER TYPE WITH HYPOGLYCEMIA, UNSPECIFIED HYPOGLYCEMIA COMA STATUS (HCC): ICD-10-CM

## 2021-01-01 DIAGNOSIS — E87.1 HYPONATREMIA: ICD-10-CM

## 2021-01-01 DIAGNOSIS — G93.40 ENCEPHALOPATHY: ICD-10-CM

## 2021-01-01 DIAGNOSIS — J81.0 ACUTE PULMONARY EDEMA (HCC): ICD-10-CM

## 2021-01-01 DIAGNOSIS — Z00.00 ENCOUNTER FOR GENERAL ADULT MEDICAL EXAMINATION WITHOUT ABNORMAL FINDINGS: ICD-10-CM

## 2021-01-01 DIAGNOSIS — E87.29 INCREASED ANION GAP METABOLIC ACIDOSIS: ICD-10-CM

## 2021-01-01 DIAGNOSIS — R41.82 ALTERED MENTAL STATUS, UNSPECIFIED ALTERED MENTAL STATUS TYPE: ICD-10-CM

## 2021-01-01 DIAGNOSIS — R41.89 COGNITIVE CHANGES: ICD-10-CM

## 2021-01-01 DIAGNOSIS — N18.6 END STAGE KIDNEY DISEASE (HCC): ICD-10-CM

## 2021-01-01 DIAGNOSIS — J96.01 ACUTE RESPIRATORY FAILURE WITH HYPOXIA (HCC): ICD-10-CM

## 2021-01-01 DIAGNOSIS — R11.2 INTRACTABLE NAUSEA AND VOMITING: ICD-10-CM

## 2021-01-01 DIAGNOSIS — K59.00 CONSTIPATION, UNSPECIFIED CONSTIPATION TYPE: ICD-10-CM

## 2021-01-01 DIAGNOSIS — Z11.59 SCREENING FOR VIRAL DISEASE: Primary | ICD-10-CM

## 2021-01-01 DIAGNOSIS — K31.84 GASTROPARESIS DUE TO DM (HCC): ICD-10-CM

## 2021-01-01 DIAGNOSIS — E87.3 METABOLIC ALKALOSIS: ICD-10-CM

## 2021-01-01 DIAGNOSIS — N18.4 CKD STAGE 4 DUE TO TYPE 2 DIABETES MELLITUS (HCC): ICD-10-CM

## 2021-01-01 DIAGNOSIS — E87.6 HYPOKALEMIA: ICD-10-CM

## 2021-01-01 DIAGNOSIS — M84.48XA SACRAL INSUFFICIENCY FRACTURE, INITIAL ENCOUNTER: Primary | ICD-10-CM

## 2021-01-01 DIAGNOSIS — J18.9 PNEUMONIA DUE TO INFECTIOUS ORGANISM, UNSPECIFIED LATERALITY, UNSPECIFIED PART OF LUNG: ICD-10-CM

## 2021-01-01 DIAGNOSIS — I16.0 HYPERTENSIVE URGENCY: ICD-10-CM

## 2021-01-01 DIAGNOSIS — E08.65 DIABETES MELLITUS DUE TO UNDERLYING CONDITION, UNCONTROLLED, WITH HYPERGLYCEMIA (HCC): ICD-10-CM

## 2021-01-01 DIAGNOSIS — R26.9 GAIT ABNORMALITY: ICD-10-CM

## 2021-01-01 DIAGNOSIS — R77.8 TROPONIN LEVEL ELEVATED: ICD-10-CM

## 2021-01-01 LAB
A-A DO2: 31.6 MMHG
A-A DO2: 544.6 MMHG
ABO GROUP BLD: NORMAL
ABO GROUP BLD: NORMAL
ACETONE BLD QL: ABNORMAL
ACETONE BLD QL: NEGATIVE
ALBUMIN SERPL-MCNC: 2.8 G/DL (ref 3.5–5.2)
ALBUMIN SERPL-MCNC: 2.8 G/DL (ref 3.5–5.2)
ALBUMIN SERPL-MCNC: 3.1 G/DL (ref 3.5–5.2)
ALBUMIN SERPL-MCNC: 3.3 G/DL (ref 3.5–5.2)
ALBUMIN SERPL-MCNC: 3.6 G/DL (ref 3.5–5.2)
ALBUMIN SERPL-MCNC: 3.6 G/DL (ref 3.5–5.2)
ALBUMIN SERPL-MCNC: 3.8 G/DL (ref 3.5–5.2)
ALBUMIN SERPL-MCNC: 3.8 G/DL (ref 3.5–5.2)
ALBUMIN SERPL-MCNC: 4.1 G/DL (ref 3.5–5.2)
ALBUMIN/GLOB SERPL: 0.8 G/DL
ALBUMIN/GLOB SERPL: 0.9 G/DL
ALBUMIN/GLOB SERPL: 1 G/DL
ALBUMIN/GLOB SERPL: 1 G/DL
ALBUMIN/GLOB SERPL: 1.1 G/DL
ALBUMIN/GLOB SERPL: 1.2 G/DL
ALBUMIN/GLOB SERPL: 1.3 G/DL
ALBUMIN/GLOB SERPL: 1.3 G/DL
ALBUMIN/GLOB SERPL: 1.4 G/DL
ALP SERPL-CCNC: 102 U/L (ref 39–117)
ALP SERPL-CCNC: 102 U/L (ref 39–117)
ALP SERPL-CCNC: 108 U/L (ref 39–117)
ALP SERPL-CCNC: 116 U/L (ref 39–117)
ALP SERPL-CCNC: 116 U/L (ref 39–117)
ALP SERPL-CCNC: 117 U/L (ref 39–117)
ALP SERPL-CCNC: 122 U/L (ref 39–117)
ALP SERPL-CCNC: 123 U/L (ref 39–117)
ALP SERPL-CCNC: 125 U/L (ref 39–117)
ALP SERPL-CCNC: 134 U/L (ref 39–117)
ALP SERPL-CCNC: 143 U/L (ref 39–117)
ALP SERPL-CCNC: 144 U/L (ref 39–117)
ALP SERPL-CCNC: 167 U/L (ref 39–117)
ALP SERPL-CCNC: 205 U/L (ref 39–117)
ALP SERPL-CCNC: 88 U/L (ref 39–117)
ALP SERPL-CCNC: 99 U/L (ref 39–117)
ALT SERPL W P-5'-P-CCNC: 10 U/L (ref 1–33)
ALT SERPL W P-5'-P-CCNC: 11 U/L (ref 1–33)
ALT SERPL W P-5'-P-CCNC: 21 U/L (ref 1–33)
ALT SERPL W P-5'-P-CCNC: 25 U/L (ref 1–33)
ALT SERPL W P-5'-P-CCNC: 40 U/L (ref 1–33)
ALT SERPL W P-5'-P-CCNC: 5 U/L (ref 1–33)
ALT SERPL W P-5'-P-CCNC: 6 U/L (ref 1–33)
ALT SERPL W P-5'-P-CCNC: 7 U/L (ref 1–33)
ALT SERPL W P-5'-P-CCNC: 7 U/L (ref 1–33)
ALT SERPL W P-5'-P-CCNC: 8 U/L (ref 1–33)
ALT SERPL W P-5'-P-CCNC: 8 U/L (ref 1–33)
ALT SERPL W P-5'-P-CCNC: 9 U/L (ref 1–33)
AMMONIA BLD-SCNC: 11 UMOL/L (ref 11–51)
AMMONIA BLD-SCNC: 13 UMOL/L (ref 11–51)
AMMONIA BLD-SCNC: <10 UMOL/L (ref 11–51)
AMPHET+METHAMPHET UR QL: NEGATIVE
AMPHETAMINES UR QL: NEGATIVE
ANION GAP SERPL CALCULATED.3IONS-SCNC: 10 MMOL/L (ref 5–15)
ANION GAP SERPL CALCULATED.3IONS-SCNC: 11 MMOL/L (ref 5–15)
ANION GAP SERPL CALCULATED.3IONS-SCNC: 11 MMOL/L (ref 5–15)
ANION GAP SERPL CALCULATED.3IONS-SCNC: 12 MMOL/L (ref 5–15)
ANION GAP SERPL CALCULATED.3IONS-SCNC: 12 MMOL/L (ref 5–15)
ANION GAP SERPL CALCULATED.3IONS-SCNC: 13 MMOL/L (ref 5–15)
ANION GAP SERPL CALCULATED.3IONS-SCNC: 14 MMOL/L (ref 5–15)
ANION GAP SERPL CALCULATED.3IONS-SCNC: 15 MMOL/L (ref 5–15)
ANION GAP SERPL CALCULATED.3IONS-SCNC: 16 MMOL/L (ref 5–15)
ANION GAP SERPL CALCULATED.3IONS-SCNC: 17 MMOL/L (ref 5–15)
ANION GAP SERPL CALCULATED.3IONS-SCNC: 18 MMOL/L (ref 5–15)
ANION GAP SERPL CALCULATED.3IONS-SCNC: 18 MMOL/L (ref 5–15)
ANION GAP SERPL CALCULATED.3IONS-SCNC: 19 MMOL/L (ref 5–15)
ANION GAP SERPL CALCULATED.3IONS-SCNC: 20 MMOL/L (ref 5–15)
ANION GAP SERPL CALCULATED.3IONS-SCNC: 21 MMOL/L (ref 5–15)
ANION GAP SERPL CALCULATED.3IONS-SCNC: 23 MMOL/L (ref 5–15)
ANION GAP SERPL CALCULATED.3IONS-SCNC: 9 MMOL/L (ref 5–15)
APAP SERPL-MCNC: <5 MCG/ML (ref 0–30)
APTT PPP: 30.3 SECONDS (ref 24.1–35)
APTT PPP: 30.9 SECONDS (ref 24.1–35)
APTT PPP: 32.1 SECONDS (ref 24.1–35)
APTT PPP: 33.1 SECONDS (ref 24.1–35)
ARTERIAL PATENCY WRIST A: ABNORMAL
ARTERIAL PATENCY WRIST A: POSITIVE
AST SERPL-CCNC: 10 U/L (ref 1–32)
AST SERPL-CCNC: 11 U/L (ref 1–32)
AST SERPL-CCNC: 11 U/L (ref 1–32)
AST SERPL-CCNC: 12 U/L (ref 1–32)
AST SERPL-CCNC: 12 U/L (ref 1–32)
AST SERPL-CCNC: 14 U/L (ref 1–32)
AST SERPL-CCNC: 14 U/L (ref 1–32)
AST SERPL-CCNC: 15 U/L (ref 1–32)
AST SERPL-CCNC: 16 U/L (ref 1–32)
AST SERPL-CCNC: 17 U/L (ref 1–32)
AST SERPL-CCNC: 21 U/L (ref 1–32)
AST SERPL-CCNC: 22 U/L (ref 1–32)
AST SERPL-CCNC: 27 U/L (ref 1–32)
AST SERPL-CCNC: 81 U/L (ref 1–32)
ATMOSPHERIC PRESS: 746 MMHG
ATMOSPHERIC PRESS: 748 MMHG
ATMOSPHERIC PRESS: 750 MMHG
ATMOSPHERIC PRESS: 751 MMHG
ATMOSPHERIC PRESS: 752 MMHG
BACTERIA SPEC AEROBE CULT: NORMAL
BACTERIA UR QL AUTO: ABNORMAL /HPF
BARBITURATES UR QL SCN: NEGATIVE
BASE EXCESS BLDA CALC-SCNC: -10.7 MMOL/L (ref 0–2)
BASE EXCESS BLDA CALC-SCNC: -3.4 MMOL/L (ref 0–2)
BASE EXCESS BLDA CALC-SCNC: -3.7 MMOL/L (ref 0–2)
BASE EXCESS BLDA CALC-SCNC: 1.7 MMOL/L (ref 0–2)
BASE EXCESS BLDV CALC-SCNC: 5.4 MMOL/L (ref 0–2)
BASOPHILS # BLD AUTO: 0.04 10*3/MM3 (ref 0–0.2)
BASOPHILS # BLD AUTO: 0.05 10*3/MM3 (ref 0–0.2)
BASOPHILS # BLD AUTO: 0.06 10*3/MM3 (ref 0–0.2)
BASOPHILS # BLD AUTO: 0.07 10*3/MM3 (ref 0–0.2)
BASOPHILS # BLD AUTO: 0.08 10*3/MM3 (ref 0–0.2)
BASOPHILS # BLD AUTO: 0.09 10*3/MM3 (ref 0–0.2)
BASOPHILS # BLD AUTO: 0.1 10*3/MM3 (ref 0–0.2)
BASOPHILS NFR BLD AUTO: 0.5 % (ref 0–1.5)
BASOPHILS NFR BLD AUTO: 0.6 % (ref 0–1.5)
BASOPHILS NFR BLD AUTO: 0.7 % (ref 0–1.5)
BASOPHILS NFR BLD AUTO: 0.9 % (ref 0–1.5)
BASOPHILS NFR BLD AUTO: 0.9 % (ref 0–1.5)
BASOPHILS NFR BLD AUTO: 1 % (ref 0–1.5)
BASOPHILS NFR BLD AUTO: 1.1 % (ref 0–1.5)
BASOPHILS NFR BLD AUTO: 1.2 % (ref 0–1.5)
BASOPHILS NFR BLD AUTO: 1.2 % (ref 0–1.5)
BASOPHILS NFR BLD AUTO: 1.3 % (ref 0–1.5)
BASOPHILS NFR BLD AUTO: 1.4 % (ref 0–1.5)
BASOPHILS NFR BLD AUTO: 1.6 % (ref 0–1.5)
BASOPHILS NFR BLD AUTO: 1.7 % (ref 0–1.5)
BASOPHILS NFR BLD AUTO: 2 % (ref 0–1.5)
BDY SITE: ABNORMAL
BENZODIAZ UR QL SCN: NEGATIVE
BILIRUB SERPL-MCNC: 0.2 MG/DL (ref 0–1.2)
BILIRUB SERPL-MCNC: 0.3 MG/DL (ref 0–1.2)
BILIRUB SERPL-MCNC: 0.4 MG/DL (ref 0–1.2)
BILIRUB SERPL-MCNC: 0.4 MG/DL (ref 0–1.2)
BILIRUB SERPL-MCNC: 0.5 MG/DL (ref 0–1.2)
BILIRUB SERPL-MCNC: 0.6 MG/DL (ref 0–1.2)
BILIRUB UR QL STRIP: NEGATIVE
BLD GP AB SCN SERPL QL: NEGATIVE
BLD GP AB SCN SERPL QL: NEGATIVE
BODY TEMPERATURE: 37 C
BUN SERPL-MCNC: 16 MG/DL (ref 6–20)
BUN SERPL-MCNC: 17 MG/DL (ref 6–20)
BUN SERPL-MCNC: 18 MG/DL (ref 6–20)
BUN SERPL-MCNC: 22 MG/DL (ref 6–20)
BUN SERPL-MCNC: 23 MG/DL (ref 6–20)
BUN SERPL-MCNC: 24 MG/DL (ref 6–20)
BUN SERPL-MCNC: 24 MG/DL (ref 6–20)
BUN SERPL-MCNC: 27 MG/DL (ref 6–20)
BUN SERPL-MCNC: 28 MG/DL (ref 6–20)
BUN SERPL-MCNC: 29 MG/DL (ref 6–20)
BUN SERPL-MCNC: 31 MG/DL (ref 6–20)
BUN SERPL-MCNC: 34 MG/DL (ref 6–20)
BUN SERPL-MCNC: 37 MG/DL (ref 6–20)
BUN SERPL-MCNC: 40 MG/DL (ref 6–20)
BUN SERPL-MCNC: 41 MG/DL (ref 6–20)
BUN SERPL-MCNC: 42 MG/DL (ref 6–20)
BUN SERPL-MCNC: 42 MG/DL (ref 6–20)
BUN SERPL-MCNC: 44 MG/DL (ref 6–20)
BUN SERPL-MCNC: 45 MG/DL (ref 6–20)
BUN SERPL-MCNC: 47 MG/DL (ref 6–20)
BUN SERPL-MCNC: 76 MG/DL (ref 6–20)
BUN SERPL-MCNC: 86 MG/DL (ref 6–20)
BUN/CREAT SERPL: 10 (ref 7–25)
BUN/CREAT SERPL: 12.1 (ref 7–25)
BUN/CREAT SERPL: 16.2 (ref 7–25)
BUN/CREAT SERPL: 16.5 (ref 7–25)
BUN/CREAT SERPL: 4.9 (ref 7–25)
BUN/CREAT SERPL: 4.9 (ref 7–25)
BUN/CREAT SERPL: 5 (ref 7–25)
BUN/CREAT SERPL: 5.1 (ref 7–25)
BUN/CREAT SERPL: 5.7 (ref 7–25)
BUN/CREAT SERPL: 5.8 (ref 7–25)
BUN/CREAT SERPL: 6.2 (ref 7–25)
BUN/CREAT SERPL: 6.3 (ref 7–25)
BUN/CREAT SERPL: 6.3 (ref 7–25)
BUN/CREAT SERPL: 6.5 (ref 7–25)
BUN/CREAT SERPL: 6.6 (ref 7–25)
BUN/CREAT SERPL: 7 (ref 7–25)
BUN/CREAT SERPL: 7.5 (ref 7–25)
BUN/CREAT SERPL: 7.8 (ref 7–25)
BUN/CREAT SERPL: 7.9 (ref 7–25)
BUN/CREAT SERPL: 7.9 (ref 7–25)
BUN/CREAT SERPL: 8.4 (ref 7–25)
BUN/CREAT SERPL: 8.4 (ref 7–25)
BUN/CREAT SERPL: 8.5 (ref 7–25)
BUN/CREAT SERPL: 9 (ref 7–25)
BUN/CREAT SERPL: 9.1 (ref 7–25)
BUN/CREAT SERPL: 9.1 (ref 7–25)
BUN/CREAT SERPL: 9.2 (ref 7–25)
BUN/CREAT SERPL: 9.2 (ref 7–25)
BUN/CREAT SERPL: 9.3 (ref 7–25)
BUN/CREAT SERPL: 9.3 (ref 7–25)
BUN/CREAT SERPL: 9.5 (ref 7–25)
BUN/CREAT SERPL: 9.7 (ref 7–25)
BUN/CREAT SERPL: 9.8 (ref 7–25)
BUPRENORPHINE SERPL-MCNC: NEGATIVE NG/ML
CALCIUM SPEC-SCNC: 7.6 MG/DL (ref 8.6–10.5)
CALCIUM SPEC-SCNC: 7.7 MG/DL (ref 8.6–10.5)
CALCIUM SPEC-SCNC: 7.8 MG/DL (ref 8.6–10.5)
CALCIUM SPEC-SCNC: 7.8 MG/DL (ref 8.6–10.5)
CALCIUM SPEC-SCNC: 7.9 MG/DL (ref 8.6–10.5)
CALCIUM SPEC-SCNC: 8 MG/DL (ref 8.6–10.5)
CALCIUM SPEC-SCNC: 8.1 MG/DL (ref 8.6–10.5)
CALCIUM SPEC-SCNC: 8.2 MG/DL (ref 8.6–10.5)
CALCIUM SPEC-SCNC: 8.3 MG/DL (ref 8.6–10.5)
CALCIUM SPEC-SCNC: 8.4 MG/DL (ref 8.6–10.5)
CALCIUM SPEC-SCNC: 8.5 MG/DL (ref 8.6–10.5)
CALCIUM SPEC-SCNC: 8.6 MG/DL (ref 8.6–10.5)
CALCIUM SPEC-SCNC: 8.7 MG/DL (ref 8.6–10.5)
CALCIUM SPEC-SCNC: 8.8 MG/DL (ref 8.6–10.5)
CALCIUM SPEC-SCNC: 8.9 MG/DL (ref 8.6–10.5)
CALCIUM SPEC-SCNC: 8.9 MG/DL (ref 8.6–10.5)
CALCIUM SPEC-SCNC: 9 MG/DL (ref 8.6–10.5)
CALCIUM SPEC-SCNC: 9 MG/DL (ref 8.6–10.5)
CALCIUM SPEC-SCNC: 9.1 MG/DL (ref 8.6–10.5)
CALCIUM SPEC-SCNC: 9.2 MG/DL (ref 8.6–10.5)
CANNABINOIDS SERPL QL: NEGATIVE
CHLORIDE SERPL-SCNC: 67 MMOL/L (ref 98–107)
CHLORIDE SERPL-SCNC: 71 MMOL/L (ref 98–107)
CHLORIDE SERPL-SCNC: 72 MMOL/L (ref 98–107)
CHLORIDE SERPL-SCNC: 75 MMOL/L (ref 98–107)
CHLORIDE SERPL-SCNC: 79 MMOL/L (ref 98–107)
CHLORIDE SERPL-SCNC: 82 MMOL/L (ref 98–107)
CHLORIDE SERPL-SCNC: 83 MMOL/L (ref 98–107)
CHLORIDE SERPL-SCNC: 84 MMOL/L (ref 98–107)
CHLORIDE SERPL-SCNC: 89 MMOL/L (ref 98–107)
CHLORIDE SERPL-SCNC: 89 MMOL/L (ref 98–107)
CHLORIDE SERPL-SCNC: 90 MMOL/L (ref 98–107)
CHLORIDE SERPL-SCNC: 90 MMOL/L (ref 98–107)
CHLORIDE SERPL-SCNC: 91 MMOL/L (ref 98–107)
CHLORIDE SERPL-SCNC: 92 MMOL/L (ref 98–107)
CHLORIDE SERPL-SCNC: 93 MMOL/L (ref 98–107)
CHLORIDE SERPL-SCNC: 94 MMOL/L (ref 98–107)
CHLORIDE SERPL-SCNC: 95 MMOL/L (ref 98–107)
CHLORIDE SERPL-SCNC: 96 MMOL/L (ref 98–107)
CHLORIDE SERPL-SCNC: 97 MMOL/L (ref 98–107)
CHLORIDE SERPL-SCNC: 97 MMOL/L (ref 98–107)
CHLORIDE SERPL-SCNC: 98 MMOL/L (ref 98–107)
CHLORIDE SERPL-SCNC: 99 MMOL/L (ref 98–107)
CHOLEST SERPL-MCNC: 130 MG/DL (ref 0–200)
CHOLEST SERPL-MCNC: 157 MG/DL (ref 0–200)
CK SERPL-CCNC: 95 U/L (ref 20–180)
CLARITY UR: CLEAR
CO2 SERPL-SCNC: 14 MMOL/L (ref 22–29)
CO2 SERPL-SCNC: 14 MMOL/L (ref 22–29)
CO2 SERPL-SCNC: 16 MMOL/L (ref 22–29)
CO2 SERPL-SCNC: 16 MMOL/L (ref 22–29)
CO2 SERPL-SCNC: 17 MMOL/L (ref 22–29)
CO2 SERPL-SCNC: 17 MMOL/L (ref 22–29)
CO2 SERPL-SCNC: 18 MMOL/L (ref 22–29)
CO2 SERPL-SCNC: 20 MMOL/L (ref 22–29)
CO2 SERPL-SCNC: 21 MMOL/L (ref 22–29)
CO2 SERPL-SCNC: 21 MMOL/L (ref 22–29)
CO2 SERPL-SCNC: 22 MMOL/L (ref 22–29)
CO2 SERPL-SCNC: 23 MMOL/L (ref 22–29)
CO2 SERPL-SCNC: 24 MMOL/L (ref 22–29)
CO2 SERPL-SCNC: 25 MMOL/L (ref 22–29)
CO2 SERPL-SCNC: 25 MMOL/L (ref 22–29)
CO2 SERPL-SCNC: 26 MMOL/L (ref 22–29)
CO2 SERPL-SCNC: 27 MMOL/L (ref 22–29)
CO2 SERPL-SCNC: 28 MMOL/L (ref 22–29)
CO2 SERPL-SCNC: 28 MMOL/L (ref 22–29)
CO2 SERPL-SCNC: 29 MMOL/L (ref 22–29)
CO2 SERPL-SCNC: 31 MMOL/L (ref 22–29)
COCAINE UR QL: NEGATIVE
COHGB MFR BLD: 1.3 % (ref 0–5)
COHGB MFR BLD: 4 % (ref 0–5)
COLOR UR: YELLOW
CREAT SERPL-MCNC: 2.43 MG/DL (ref 0.57–1)
CREAT SERPL-MCNC: 2.58 MG/DL (ref 0.57–1)
CREAT SERPL-MCNC: 2.62 MG/DL (ref 0.57–1)
CREAT SERPL-MCNC: 2.83 MG/DL (ref 0.57–1)
CREAT SERPL-MCNC: 2.84 MG/DL (ref 0.57–1)
CREAT SERPL-MCNC: 2.88 MG/DL (ref 0.57–1)
CREAT SERPL-MCNC: 3.16 MG/DL (ref 0.57–1)
CREAT SERPL-MCNC: 3.18 MG/DL (ref 0.57–1)
CREAT SERPL-MCNC: 3.19 MG/DL (ref 0.57–1)
CREAT SERPL-MCNC: 3.43 MG/DL (ref 0.57–1)
CREAT SERPL-MCNC: 3.45 MG/DL (ref 0.57–1)
CREAT SERPL-MCNC: 3.47 MG/DL (ref 0.57–1)
CREAT SERPL-MCNC: 3.48 MG/DL (ref 0.57–1)
CREAT SERPL-MCNC: 3.52 MG/DL (ref 0.57–1)
CREAT SERPL-MCNC: 3.64 MG/DL (ref 0.57–1)
CREAT SERPL-MCNC: 3.79 MG/DL (ref 0.57–1)
CREAT SERPL-MCNC: 3.91 MG/DL (ref 0.57–1)
CREAT SERPL-MCNC: 3.91 MG/DL (ref 0.57–1)
CREAT SERPL-MCNC: 3.98 MG/DL (ref 0.57–1)
CREAT SERPL-MCNC: 4.21 MG/DL (ref 0.57–1)
CREAT SERPL-MCNC: 4.52 MG/DL (ref 0.57–1)
CREAT SERPL-MCNC: 4.62 MG/DL (ref 0.57–1)
CREAT SERPL-MCNC: 4.68 MG/DL (ref 0.57–1)
CREAT SERPL-MCNC: 4.75 MG/DL (ref 0.57–1)
CREAT SERPL-MCNC: 4.75 MG/DL (ref 0.57–1)
CREAT SERPL-MCNC: 4.82 MG/DL (ref 0.57–1)
CREAT SERPL-MCNC: 4.87 MG/DL (ref 0.57–1)
CREAT SERPL-MCNC: 4.87 MG/DL (ref 0.57–1)
CREAT SERPL-MCNC: 4.89 MG/DL (ref 0.57–1)
CREAT SERPL-MCNC: 4.9 MG/DL (ref 0.57–1)
CREAT SERPL-MCNC: 4.95 MG/DL (ref 0.57–1)
CREAT SERPL-MCNC: 4.99 MG/DL (ref 0.57–1)
CREAT SERPL-MCNC: 5.02 MG/DL (ref 0.57–1)
CREAT SERPL-MCNC: 5.2 MG/DL (ref 0.57–1)
CREAT SERPL-MCNC: 5.22 MG/DL (ref 0.57–1)
D DIMER PPP FEU-MCNC: 1.22 MG/L (FEU) (ref 0–0.5)
D-LACTATE SERPL-SCNC: 1 MMOL/L (ref 0.5–2)
D-LACTATE SERPL-SCNC: 1.4 MMOL/L (ref 0.5–2)
DEPRECATED RDW RBC AUTO: 43.1 FL (ref 37–54)
DEPRECATED RDW RBC AUTO: 45 FL (ref 37–54)
DEPRECATED RDW RBC AUTO: 46 FL (ref 37–54)
DEPRECATED RDW RBC AUTO: 46.5 FL (ref 37–54)
DEPRECATED RDW RBC AUTO: 48 FL (ref 37–54)
DEPRECATED RDW RBC AUTO: 50.1 FL (ref 37–54)
DEPRECATED RDW RBC AUTO: 52.6 FL (ref 37–54)
DEPRECATED RDW RBC AUTO: 53.1 FL (ref 37–54)
DEPRECATED RDW RBC AUTO: 53.3 FL (ref 37–54)
DEPRECATED RDW RBC AUTO: 53.4 FL (ref 37–54)
DEPRECATED RDW RBC AUTO: 54 FL (ref 37–54)
DEPRECATED RDW RBC AUTO: 54 FL (ref 37–54)
DEPRECATED RDW RBC AUTO: 54.1 FL (ref 37–54)
DEPRECATED RDW RBC AUTO: 54.3 FL (ref 37–54)
DEPRECATED RDW RBC AUTO: 54.4 FL (ref 37–54)
DEPRECATED RDW RBC AUTO: 54.5 FL (ref 37–54)
DEPRECATED RDW RBC AUTO: 54.5 FL (ref 37–54)
DEPRECATED RDW RBC AUTO: 54.6 FL (ref 37–54)
DEPRECATED RDW RBC AUTO: 54.8 FL (ref 37–54)
DEPRECATED RDW RBC AUTO: 55.3 FL (ref 37–54)
DEPRECATED RDW RBC AUTO: 55.4 FL (ref 37–54)
DEPRECATED RDW RBC AUTO: 56.2 FL (ref 37–54)
DEPRECATED RDW RBC AUTO: 56.3 FL (ref 37–54)
DEPRECATED RDW RBC AUTO: 56.6 FL (ref 37–54)
DEPRECATED RDW RBC AUTO: 57.1 FL (ref 37–54)
EOSINOPHIL # BLD AUTO: 0.01 10*3/MM3 (ref 0–0.4)
EOSINOPHIL # BLD AUTO: 0.02 10*3/MM3 (ref 0–0.4)
EOSINOPHIL # BLD AUTO: 0.02 10*3/MM3 (ref 0–0.4)
EOSINOPHIL # BLD AUTO: 0.03 10*3/MM3 (ref 0–0.4)
EOSINOPHIL # BLD AUTO: 0.04 10*3/MM3 (ref 0–0.4)
EOSINOPHIL # BLD AUTO: 0.1 10*3/MM3 (ref 0–0.4)
EOSINOPHIL # BLD AUTO: 0.12 10*3/MM3 (ref 0–0.4)
EOSINOPHIL # BLD AUTO: 0.14 10*3/MM3 (ref 0–0.4)
EOSINOPHIL # BLD AUTO: 0.15 10*3/MM3 (ref 0–0.4)
EOSINOPHIL # BLD AUTO: 0.16 10*3/MM3 (ref 0–0.4)
EOSINOPHIL # BLD AUTO: 0.19 10*3/MM3 (ref 0–0.4)
EOSINOPHIL # BLD AUTO: 0.26 10*3/MM3 (ref 0–0.4)
EOSINOPHIL # BLD AUTO: 0.28 10*3/MM3 (ref 0–0.4)
EOSINOPHIL # BLD AUTO: 0.29 10*3/MM3 (ref 0–0.4)
EOSINOPHIL # BLD AUTO: 0.34 10*3/MM3 (ref 0–0.4)
EOSINOPHIL # BLD AUTO: 0.35 10*3/MM3 (ref 0–0.4)
EOSINOPHIL # BLD AUTO: 0.43 10*3/MM3 (ref 0–0.4)
EOSINOPHIL # BLD AUTO: 0.45 10*3/MM3 (ref 0–0.4)
EOSINOPHIL NFR BLD AUTO: 0.1 % (ref 0.3–6.2)
EOSINOPHIL NFR BLD AUTO: 0.3 % (ref 0.3–6.2)
EOSINOPHIL NFR BLD AUTO: 0.3 % (ref 0.3–6.2)
EOSINOPHIL NFR BLD AUTO: 0.4 % (ref 0.3–6.2)
EOSINOPHIL NFR BLD AUTO: 0.6 % (ref 0.3–6.2)
EOSINOPHIL NFR BLD AUTO: 1.3 % (ref 0.3–6.2)
EOSINOPHIL NFR BLD AUTO: 1.6 % (ref 0.3–6.2)
EOSINOPHIL NFR BLD AUTO: 1.6 % (ref 0.3–6.2)
EOSINOPHIL NFR BLD AUTO: 2.6 % (ref 0.3–6.2)
EOSINOPHIL NFR BLD AUTO: 2.7 % (ref 0.3–6.2)
EOSINOPHIL NFR BLD AUTO: 2.9 % (ref 0.3–6.2)
EOSINOPHIL NFR BLD AUTO: 3.1 % (ref 0.3–6.2)
EOSINOPHIL NFR BLD AUTO: 3.2 % (ref 0.3–6.2)
EOSINOPHIL NFR BLD AUTO: 3.5 % (ref 0.3–6.2)
EOSINOPHIL NFR BLD AUTO: 4.8 % (ref 0.3–6.2)
EOSINOPHIL NFR BLD AUTO: 6.1 % (ref 0.3–6.2)
EOSINOPHIL NFR BLD AUTO: 6.5 % (ref 0.3–6.2)
EOSINOPHIL NFR BLD AUTO: 7.5 % (ref 0.3–6.2)
EOSINOPHIL NFR BLD AUTO: 7.9 % (ref 0.3–6.2)
EOSINOPHIL NFR BLD AUTO: 8 % (ref 0.3–6.2)
EPAP: 8
ERYTHROCYTE [DISTWIDTH] IN BLOOD BY AUTOMATED COUNT: 13.9 % (ref 12.3–15.4)
ERYTHROCYTE [DISTWIDTH] IN BLOOD BY AUTOMATED COUNT: 14.3 % (ref 12.3–15.4)
ERYTHROCYTE [DISTWIDTH] IN BLOOD BY AUTOMATED COUNT: 14.4 % (ref 12.3–15.4)
ERYTHROCYTE [DISTWIDTH] IN BLOOD BY AUTOMATED COUNT: 14.4 % (ref 12.3–15.4)
ERYTHROCYTE [DISTWIDTH] IN BLOOD BY AUTOMATED COUNT: 14.8 % (ref 12.3–15.4)
ERYTHROCYTE [DISTWIDTH] IN BLOOD BY AUTOMATED COUNT: 14.9 % (ref 12.3–15.4)
ERYTHROCYTE [DISTWIDTH] IN BLOOD BY AUTOMATED COUNT: 15.5 % (ref 12.3–15.4)
ERYTHROCYTE [DISTWIDTH] IN BLOOD BY AUTOMATED COUNT: 15.6 % (ref 12.3–15.4)
ERYTHROCYTE [DISTWIDTH] IN BLOOD BY AUTOMATED COUNT: 15.6 % (ref 12.3–15.4)
ERYTHROCYTE [DISTWIDTH] IN BLOOD BY AUTOMATED COUNT: 15.7 % (ref 12.3–15.4)
ERYTHROCYTE [DISTWIDTH] IN BLOOD BY AUTOMATED COUNT: 15.9 % (ref 12.3–15.4)
ERYTHROCYTE [DISTWIDTH] IN BLOOD BY AUTOMATED COUNT: 16 % (ref 12.3–15.4)
ERYTHROCYTE [DISTWIDTH] IN BLOOD BY AUTOMATED COUNT: 16 % (ref 12.3–15.4)
ERYTHROCYTE [DISTWIDTH] IN BLOOD BY AUTOMATED COUNT: 16.1 % (ref 12.3–15.4)
ERYTHROCYTE [DISTWIDTH] IN BLOOD BY AUTOMATED COUNT: 16.2 % (ref 12.3–15.4)
ERYTHROCYTE [DISTWIDTH] IN BLOOD BY AUTOMATED COUNT: 16.2 % (ref 12.3–15.4)
ERYTHROCYTE [DISTWIDTH] IN BLOOD BY AUTOMATED COUNT: 16.5 % (ref 12.3–15.4)
ERYTHROCYTE [DISTWIDTH] IN BLOOD BY AUTOMATED COUNT: 16.8 % (ref 12.3–15.4)
ERYTHROCYTE [DISTWIDTH] IN BLOOD BY AUTOMATED COUNT: 16.9 % (ref 12.3–15.4)
ETHANOL UR QL: <0.01 %
FERRITIN SERPL-MCNC: 1224 NG/ML (ref 13–150)
FOLATE SERPL-MCNC: 5.98 NG/ML (ref 4.78–24.2)
GAS FLOW AIRWAY: 3 LPM
GFR SERPL CREATININE-BSD FRML MDRD: 10 ML/MIN/1.73
GFR SERPL CREATININE-BSD FRML MDRD: 11 ML/MIN/1.73
GFR SERPL CREATININE-BSD FRML MDRD: 12 ML/MIN/1.73
GFR SERPL CREATININE-BSD FRML MDRD: 13 ML/MIN/1.73
GFR SERPL CREATININE-BSD FRML MDRD: 13 ML/MIN/1.73
GFR SERPL CREATININE-BSD FRML MDRD: 14 ML/MIN/1.73
GFR SERPL CREATININE-BSD FRML MDRD: 15 ML/MIN/1.73
GFR SERPL CREATININE-BSD FRML MDRD: 17 ML/MIN/1.73
GFR SERPL CREATININE-BSD FRML MDRD: 17 ML/MIN/1.73
GFR SERPL CREATININE-BSD FRML MDRD: 18 ML/MIN/1.73
GFR SERPL CREATININE-BSD FRML MDRD: 19 ML/MIN/1.73
GFR SERPL CREATININE-BSD FRML MDRD: 20 ML/MIN/1.73
GFR SERPL CREATININE-BSD FRML MDRD: 21 ML/MIN/1.73
GFR SERPL CREATININE-BSD FRML MDRD: 9 ML/MIN/1.73
GFR SERPL CREATININE-BSD FRML MDRD: ABNORMAL ML/MIN/{1.73_M2}
GLOBULIN UR ELPH-MCNC: 2.4 GM/DL
GLOBULIN UR ELPH-MCNC: 2.9 GM/DL
GLOBULIN UR ELPH-MCNC: 3 GM/DL
GLOBULIN UR ELPH-MCNC: 3 GM/DL
GLOBULIN UR ELPH-MCNC: 3.1 GM/DL
GLOBULIN UR ELPH-MCNC: 3.2 GM/DL
GLOBULIN UR ELPH-MCNC: 3.2 GM/DL
GLOBULIN UR ELPH-MCNC: 3.3 GM/DL
GLOBULIN UR ELPH-MCNC: 3.5 GM/DL
GLOBULIN UR ELPH-MCNC: 3.5 GM/DL
GLOBULIN UR ELPH-MCNC: 3.7 GM/DL
GLUCOSE BLDC GLUCOMTR-MCNC: 100 MG/DL (ref 70–130)
GLUCOSE BLDC GLUCOMTR-MCNC: 102 MG/DL (ref 70–130)
GLUCOSE BLDC GLUCOMTR-MCNC: 102 MG/DL (ref 70–130)
GLUCOSE BLDC GLUCOMTR-MCNC: 104 MG/DL (ref 70–130)
GLUCOSE BLDC GLUCOMTR-MCNC: 105 MG/DL (ref 70–130)
GLUCOSE BLDC GLUCOMTR-MCNC: 105 MG/DL (ref 70–130)
GLUCOSE BLDC GLUCOMTR-MCNC: 106 MG/DL (ref 70–130)
GLUCOSE BLDC GLUCOMTR-MCNC: 109 MG/DL (ref 70–130)
GLUCOSE BLDC GLUCOMTR-MCNC: 113 MG/DL (ref 70–130)
GLUCOSE BLDC GLUCOMTR-MCNC: 115 MG/DL (ref 70–130)
GLUCOSE BLDC GLUCOMTR-MCNC: 116 MG/DL (ref 70–130)
GLUCOSE BLDC GLUCOMTR-MCNC: 117 MG/DL (ref 70–130)
GLUCOSE BLDC GLUCOMTR-MCNC: 119 MG/DL (ref 70–130)
GLUCOSE BLDC GLUCOMTR-MCNC: 119 MG/DL (ref 70–130)
GLUCOSE BLDC GLUCOMTR-MCNC: 120 MG/DL (ref 70–130)
GLUCOSE BLDC GLUCOMTR-MCNC: 122 MG/DL (ref 70–130)
GLUCOSE BLDC GLUCOMTR-MCNC: 123 MG/DL (ref 70–130)
GLUCOSE BLDC GLUCOMTR-MCNC: 124 MG/DL (ref 70–130)
GLUCOSE BLDC GLUCOMTR-MCNC: 127 MG/DL (ref 70–130)
GLUCOSE BLDC GLUCOMTR-MCNC: 129 MG/DL (ref 70–130)
GLUCOSE BLDC GLUCOMTR-MCNC: 131 MG/DL (ref 70–130)
GLUCOSE BLDC GLUCOMTR-MCNC: 137 MG/DL (ref 70–130)
GLUCOSE BLDC GLUCOMTR-MCNC: 139 MG/DL (ref 70–130)
GLUCOSE BLDC GLUCOMTR-MCNC: 140 MG/DL (ref 70–130)
GLUCOSE BLDC GLUCOMTR-MCNC: 141 MG/DL (ref 70–130)
GLUCOSE BLDC GLUCOMTR-MCNC: 141 MG/DL (ref 70–130)
GLUCOSE BLDC GLUCOMTR-MCNC: 144 MG/DL (ref 70–130)
GLUCOSE BLDC GLUCOMTR-MCNC: 144 MG/DL (ref 70–130)
GLUCOSE BLDC GLUCOMTR-MCNC: 149 MG/DL (ref 70–130)
GLUCOSE BLDC GLUCOMTR-MCNC: 150 MG/DL (ref 70–130)
GLUCOSE BLDC GLUCOMTR-MCNC: 151 MG/DL (ref 70–130)
GLUCOSE BLDC GLUCOMTR-MCNC: 152 MG/DL (ref 70–130)
GLUCOSE BLDC GLUCOMTR-MCNC: 155 MG/DL (ref 70–130)
GLUCOSE BLDC GLUCOMTR-MCNC: 155 MG/DL (ref 70–130)
GLUCOSE BLDC GLUCOMTR-MCNC: 156 MG/DL (ref 70–130)
GLUCOSE BLDC GLUCOMTR-MCNC: 157 MG/DL (ref 70–130)
GLUCOSE BLDC GLUCOMTR-MCNC: 158 MG/DL (ref 70–130)
GLUCOSE BLDC GLUCOMTR-MCNC: 163 MG/DL (ref 70–130)
GLUCOSE BLDC GLUCOMTR-MCNC: 164 MG/DL (ref 70–130)
GLUCOSE BLDC GLUCOMTR-MCNC: 164 MG/DL (ref 70–130)
GLUCOSE BLDC GLUCOMTR-MCNC: 166 MG/DL (ref 70–130)
GLUCOSE BLDC GLUCOMTR-MCNC: 176 MG/DL (ref 70–130)
GLUCOSE BLDC GLUCOMTR-MCNC: 180 MG/DL (ref 70–130)
GLUCOSE BLDC GLUCOMTR-MCNC: 180 MG/DL (ref 70–130)
GLUCOSE BLDC GLUCOMTR-MCNC: 181 MG/DL (ref 70–130)
GLUCOSE BLDC GLUCOMTR-MCNC: 183 MG/DL (ref 70–130)
GLUCOSE BLDC GLUCOMTR-MCNC: 185 MG/DL (ref 70–130)
GLUCOSE BLDC GLUCOMTR-MCNC: 188 MG/DL (ref 70–130)
GLUCOSE BLDC GLUCOMTR-MCNC: 189 MG/DL (ref 70–130)
GLUCOSE BLDC GLUCOMTR-MCNC: 189 MG/DL (ref 70–130)
GLUCOSE BLDC GLUCOMTR-MCNC: 194 MG/DL (ref 70–130)
GLUCOSE BLDC GLUCOMTR-MCNC: 199 MG/DL (ref 70–130)
GLUCOSE BLDC GLUCOMTR-MCNC: 201 MG/DL (ref 70–130)
GLUCOSE BLDC GLUCOMTR-MCNC: 202 MG/DL (ref 70–130)
GLUCOSE BLDC GLUCOMTR-MCNC: 206 MG/DL (ref 70–130)
GLUCOSE BLDC GLUCOMTR-MCNC: 207 MG/DL (ref 70–130)
GLUCOSE BLDC GLUCOMTR-MCNC: 215 MG/DL (ref 70–130)
GLUCOSE BLDC GLUCOMTR-MCNC: 216 MG/DL (ref 70–130)
GLUCOSE BLDC GLUCOMTR-MCNC: 216 MG/DL (ref 70–130)
GLUCOSE BLDC GLUCOMTR-MCNC: 217 MG/DL (ref 70–130)
GLUCOSE BLDC GLUCOMTR-MCNC: 218 MG/DL (ref 70–130)
GLUCOSE BLDC GLUCOMTR-MCNC: 219 MG/DL (ref 70–130)
GLUCOSE BLDC GLUCOMTR-MCNC: 220 MG/DL (ref 70–130)
GLUCOSE BLDC GLUCOMTR-MCNC: 222 MG/DL (ref 70–130)
GLUCOSE BLDC GLUCOMTR-MCNC: 223 MG/DL (ref 70–130)
GLUCOSE BLDC GLUCOMTR-MCNC: 225 MG/DL (ref 70–130)
GLUCOSE BLDC GLUCOMTR-MCNC: 225 MG/DL (ref 70–130)
GLUCOSE BLDC GLUCOMTR-MCNC: 234 MG/DL (ref 70–130)
GLUCOSE BLDC GLUCOMTR-MCNC: 234 MG/DL (ref 70–130)
GLUCOSE BLDC GLUCOMTR-MCNC: 236 MG/DL (ref 70–130)
GLUCOSE BLDC GLUCOMTR-MCNC: 239 MG/DL (ref 70–130)
GLUCOSE BLDC GLUCOMTR-MCNC: 240 MG/DL (ref 70–130)
GLUCOSE BLDC GLUCOMTR-MCNC: 242 MG/DL (ref 70–130)
GLUCOSE BLDC GLUCOMTR-MCNC: 244 MG/DL (ref 70–130)
GLUCOSE BLDC GLUCOMTR-MCNC: 247 MG/DL (ref 70–130)
GLUCOSE BLDC GLUCOMTR-MCNC: 249 MG/DL (ref 70–130)
GLUCOSE BLDC GLUCOMTR-MCNC: 255 MG/DL (ref 70–130)
GLUCOSE BLDC GLUCOMTR-MCNC: 257 MG/DL (ref 70–130)
GLUCOSE BLDC GLUCOMTR-MCNC: 261 MG/DL (ref 70–130)
GLUCOSE BLDC GLUCOMTR-MCNC: 262 MG/DL (ref 70–130)
GLUCOSE BLDC GLUCOMTR-MCNC: 269 MG/DL (ref 70–130)
GLUCOSE BLDC GLUCOMTR-MCNC: 271 MG/DL (ref 70–130)
GLUCOSE BLDC GLUCOMTR-MCNC: 274 MG/DL (ref 70–130)
GLUCOSE BLDC GLUCOMTR-MCNC: 280 MG/DL (ref 70–130)
GLUCOSE BLDC GLUCOMTR-MCNC: 293 MG/DL (ref 70–130)
GLUCOSE BLDC GLUCOMTR-MCNC: 301 MG/DL (ref 70–130)
GLUCOSE BLDC GLUCOMTR-MCNC: 302 MG/DL (ref 70–130)
GLUCOSE BLDC GLUCOMTR-MCNC: 329 MG/DL (ref 70–130)
GLUCOSE BLDC GLUCOMTR-MCNC: 330 MG/DL (ref 70–130)
GLUCOSE BLDC GLUCOMTR-MCNC: 331 MG/DL (ref 70–130)
GLUCOSE BLDC GLUCOMTR-MCNC: 333 MG/DL (ref 70–130)
GLUCOSE BLDC GLUCOMTR-MCNC: 339 MG/DL (ref 70–130)
GLUCOSE BLDC GLUCOMTR-MCNC: 34 MG/DL (ref 70–130)
GLUCOSE BLDC GLUCOMTR-MCNC: 342 MG/DL (ref 70–130)
GLUCOSE BLDC GLUCOMTR-MCNC: 342 MG/DL (ref 70–130)
GLUCOSE BLDC GLUCOMTR-MCNC: 344 MG/DL (ref 70–130)
GLUCOSE BLDC GLUCOMTR-MCNC: 35 MG/DL (ref 70–130)
GLUCOSE BLDC GLUCOMTR-MCNC: 354 MG/DL (ref 70–130)
GLUCOSE BLDC GLUCOMTR-MCNC: 401 MG/DL (ref 70–130)
GLUCOSE BLDC GLUCOMTR-MCNC: 41 MG/DL (ref 70–130)
GLUCOSE BLDC GLUCOMTR-MCNC: 414 MG/DL (ref 70–130)
GLUCOSE BLDC GLUCOMTR-MCNC: 445 MG/DL (ref 70–130)
GLUCOSE BLDC GLUCOMTR-MCNC: 446 MG/DL (ref 70–130)
GLUCOSE BLDC GLUCOMTR-MCNC: 518 MG/DL (ref 70–130)
GLUCOSE BLDC GLUCOMTR-MCNC: 535 MG/DL (ref 70–130)
GLUCOSE BLDC GLUCOMTR-MCNC: 541 MG/DL (ref 70–130)
GLUCOSE BLDC GLUCOMTR-MCNC: 59 MG/DL (ref 70–130)
GLUCOSE BLDC GLUCOMTR-MCNC: 61 MG/DL (ref 70–130)
GLUCOSE BLDC GLUCOMTR-MCNC: 62 MG/DL (ref 70–130)
GLUCOSE BLDC GLUCOMTR-MCNC: 71 MG/DL (ref 70–130)
GLUCOSE BLDC GLUCOMTR-MCNC: 72 MG/DL (ref 70–130)
GLUCOSE BLDC GLUCOMTR-MCNC: 72 MG/DL (ref 70–130)
GLUCOSE BLDC GLUCOMTR-MCNC: 79 MG/DL (ref 70–130)
GLUCOSE BLDC GLUCOMTR-MCNC: 81 MG/DL (ref 70–130)
GLUCOSE BLDC GLUCOMTR-MCNC: 84 MG/DL (ref 70–130)
GLUCOSE BLDC GLUCOMTR-MCNC: 85 MG/DL (ref 70–130)
GLUCOSE BLDC GLUCOMTR-MCNC: 87 MG/DL (ref 70–130)
GLUCOSE BLDC GLUCOMTR-MCNC: 87 MG/DL (ref 70–130)
GLUCOSE BLDC GLUCOMTR-MCNC: 88 MG/DL (ref 70–130)
GLUCOSE BLDC GLUCOMTR-MCNC: 88 MG/DL (ref 70–130)
GLUCOSE BLDC GLUCOMTR-MCNC: 91 MG/DL (ref 70–130)
GLUCOSE BLDC GLUCOMTR-MCNC: 92 MG/DL (ref 70–130)
GLUCOSE BLDC GLUCOMTR-MCNC: 96 MG/DL (ref 70–130)
GLUCOSE BLDC GLUCOMTR-MCNC: 97 MG/DL (ref 70–130)
GLUCOSE BLDC GLUCOMTR-MCNC: 98 MG/DL (ref 70–130)
GLUCOSE BLDC GLUCOMTR-MCNC: 99 MG/DL (ref 70–130)
GLUCOSE SERPL-MCNC: 106 MG/DL (ref 65–99)
GLUCOSE SERPL-MCNC: 1072 MG/DL (ref 65–99)
GLUCOSE SERPL-MCNC: 1126 MG/DL (ref 65–99)
GLUCOSE SERPL-MCNC: 1135 MG/DL (ref 65–99)
GLUCOSE SERPL-MCNC: 118 MG/DL (ref 65–99)
GLUCOSE SERPL-MCNC: 1207 MG/DL (ref 65–99)
GLUCOSE SERPL-MCNC: 122 MG/DL (ref 65–99)
GLUCOSE SERPL-MCNC: 1273 MG/DL (ref 65–99)
GLUCOSE SERPL-MCNC: 1315 MG/DL (ref 65–99)
GLUCOSE SERPL-MCNC: 132 MG/DL (ref 65–99)
GLUCOSE SERPL-MCNC: 134 MG/DL (ref 65–99)
GLUCOSE SERPL-MCNC: 1383 MG/DL (ref 65–99)
GLUCOSE SERPL-MCNC: 1448 MG/DL (ref 65–99)
GLUCOSE SERPL-MCNC: 1486 MG/DL (ref 65–99)
GLUCOSE SERPL-MCNC: 157 MG/DL (ref 65–99)
GLUCOSE SERPL-MCNC: 175 MG/DL (ref 65–99)
GLUCOSE SERPL-MCNC: 179 MG/DL (ref 65–99)
GLUCOSE SERPL-MCNC: 182 MG/DL (ref 65–99)
GLUCOSE SERPL-MCNC: 190 MG/DL (ref 65–99)
GLUCOSE SERPL-MCNC: 210 MG/DL (ref 65–99)
GLUCOSE SERPL-MCNC: 236 MG/DL (ref 65–99)
GLUCOSE SERPL-MCNC: 273 MG/DL (ref 65–99)
GLUCOSE SERPL-MCNC: 295 MG/DL (ref 65–99)
GLUCOSE SERPL-MCNC: 336 MG/DL (ref 65–99)
GLUCOSE SERPL-MCNC: 347 MG/DL (ref 65–99)
GLUCOSE SERPL-MCNC: 356 MG/DL (ref 65–99)
GLUCOSE SERPL-MCNC: 363 MG/DL (ref 65–99)
GLUCOSE SERPL-MCNC: 373 MG/DL (ref 65–99)
GLUCOSE SERPL-MCNC: 40 MG/DL (ref 65–99)
GLUCOSE SERPL-MCNC: 447 MG/DL (ref 65–99)
GLUCOSE SERPL-MCNC: 47 MG/DL (ref 65–99)
GLUCOSE SERPL-MCNC: 550 MG/DL (ref 65–99)
GLUCOSE SERPL-MCNC: 566 MG/DL (ref 65–99)
GLUCOSE SERPL-MCNC: 567 MG/DL (ref 65–99)
GLUCOSE SERPL-MCNC: 67 MG/DL (ref 65–99)
GLUCOSE SERPL-MCNC: 684 MG/DL (ref 65–99)
GLUCOSE SERPL-MCNC: 81 MG/DL (ref 65–99)
GLUCOSE SERPL-MCNC: 860 MG/DL (ref 65–99)
GLUCOSE SERPL-MCNC: 911 MG/DL (ref 65–99)
GLUCOSE SERPL-MCNC: 977 MG/DL (ref 65–99)
GLUCOSE SERPL-MCNC: 99 MG/DL (ref 65–99)
GLUCOSE SERPL-MCNC: >1500 MG/DL (ref 65–99)
GLUCOSE SERPL-MCNC: >1500 MG/DL (ref 65–99)
GLUCOSE UR STRIP-MCNC: ABNORMAL MG/DL
HBA1C MFR BLD: 10.6 % (ref 4.8–5.6)
HBA1C MFR BLD: 11.4 % (ref 4.8–5.6)
HBA1C MFR BLD: 11.5 % (ref 4.8–5.6)
HCG INTACT+B SERPL-ACNC: 6.49 MIU/ML
HCG INTACT+B SERPL-ACNC: 6.57 MIU/ML
HCG SERPL QL: POSITIVE
HCO3 BLDA-SCNC: 15.4 MMOL/L (ref 20–26)
HCO3 BLDA-SCNC: 20.3 MMOL/L (ref 20–26)
HCO3 BLDA-SCNC: 21.2 MMOL/L (ref 20–26)
HCO3 BLDA-SCNC: 27 MMOL/L (ref 20–26)
HCO3 BLDV-SCNC: 31 MMOL/L (ref 22–28)
HCT VFR BLD AUTO: 22.7 % (ref 34–46.6)
HCT VFR BLD AUTO: 23 % (ref 34–46.6)
HCT VFR BLD AUTO: 23.1 % (ref 34–46.6)
HCT VFR BLD AUTO: 23.5 % (ref 34–46.6)
HCT VFR BLD AUTO: 23.5 % (ref 34–46.6)
HCT VFR BLD AUTO: 23.8 % (ref 34–46.6)
HCT VFR BLD AUTO: 25.8 % (ref 34–46.6)
HCT VFR BLD AUTO: 25.8 % (ref 34–46.6)
HCT VFR BLD AUTO: 26.4 % (ref 34–46.6)
HCT VFR BLD AUTO: 26.9 % (ref 34–46.6)
HCT VFR BLD AUTO: 28 % (ref 34–46.6)
HCT VFR BLD AUTO: 28.2 % (ref 34–46.6)
HCT VFR BLD AUTO: 28.3 % (ref 34–46.6)
HCT VFR BLD AUTO: 28.7 % (ref 34–46.6)
HCT VFR BLD AUTO: 29.2 % (ref 34–46.6)
HCT VFR BLD AUTO: 30.5 % (ref 34–46.6)
HCT VFR BLD AUTO: 30.6 % (ref 34–46.6)
HCT VFR BLD AUTO: 31 % (ref 34–46.6)
HCT VFR BLD AUTO: 32.7 % (ref 34–46.6)
HCT VFR BLD AUTO: 33.9 % (ref 34–46.6)
HCT VFR BLD AUTO: 34.4 % (ref 34–46.6)
HCT VFR BLD AUTO: 35.9 % (ref 34–46.6)
HCT VFR BLD AUTO: 36.4 % (ref 34–46.6)
HCT VFR BLD AUTO: 36.4 % (ref 34–46.6)
HCT VFR BLD AUTO: 37.3 % (ref 34–46.6)
HCT VFR BLD CALC: 26.7 % (ref 38–51)
HCT VFR BLD CALC: 38.7 % (ref 38–51)
HDLC SERPL-MCNC: 59 MG/DL (ref 40–60)
HDLC SERPL-MCNC: 60 MG/DL (ref 40–60)
HGB BLD-MCNC: 10.4 G/DL (ref 12–15.9)
HGB BLD-MCNC: 10.6 G/DL (ref 12–15.9)
HGB BLD-MCNC: 10.9 G/DL (ref 12–15.9)
HGB BLD-MCNC: 11 G/DL (ref 12–15.9)
HGB BLD-MCNC: 11.2 G/DL (ref 12–15.9)
HGB BLD-MCNC: 11.2 G/DL (ref 12–15.9)
HGB BLD-MCNC: 11.4 G/DL (ref 12–15.9)
HGB BLD-MCNC: 11.4 G/DL (ref 12–15.9)
HGB BLD-MCNC: 7 G/DL (ref 12–15.9)
HGB BLD-MCNC: 7.1 G/DL (ref 12–15.9)
HGB BLD-MCNC: 7.2 G/DL (ref 12–15.9)
HGB BLD-MCNC: 7.3 G/DL (ref 12–15.9)
HGB BLD-MCNC: 7.3 G/DL (ref 12–15.9)
HGB BLD-MCNC: 8 G/DL (ref 12–15.9)
HGB BLD-MCNC: 8.2 G/DL (ref 12–15.9)
HGB BLD-MCNC: 8.4 G/DL (ref 12–15.9)
HGB BLD-MCNC: 8.5 G/DL (ref 12–15.9)
HGB BLD-MCNC: 8.6 G/DL (ref 12–15.9)
HGB BLD-MCNC: 8.7 G/DL (ref 12–15.9)
HGB BLD-MCNC: 8.8 G/DL (ref 12–15.9)
HGB BLD-MCNC: 8.9 G/DL (ref 12–15.9)
HGB BLD-MCNC: 9.1 G/DL (ref 12–15.9)
HGB BLD-MCNC: 9.5 G/DL (ref 12–15.9)
HGB BLD-MCNC: 9.7 G/DL (ref 12–15.9)
HGB BLD-MCNC: 9.9 G/DL (ref 12–15.9)
HGB BLDA-MCNC: 12.6 G/DL (ref 12–16)
HGB BLDA-MCNC: 8.7 G/DL (ref 12–16)
HGB UR QL STRIP.AUTO: ABNORMAL
HGB UR QL STRIP.AUTO: NEGATIVE
HGB UR QL STRIP.AUTO: NEGATIVE
HOLD SPECIMEN: NORMAL
HYALINE CASTS UR QL AUTO: ABNORMAL /LPF
IMM GRANULOCYTES # BLD AUTO: 0 10*3/MM3 (ref 0–0.05)
IMM GRANULOCYTES # BLD AUTO: 0.01 10*3/MM3 (ref 0–0.05)
IMM GRANULOCYTES # BLD AUTO: 0.02 10*3/MM3 (ref 0–0.05)
IMM GRANULOCYTES # BLD AUTO: 0.03 10*3/MM3 (ref 0–0.05)
IMM GRANULOCYTES NFR BLD AUTO: 0 % (ref 0–0.5)
IMM GRANULOCYTES NFR BLD AUTO: 0.2 % (ref 0–0.5)
IMM GRANULOCYTES NFR BLD AUTO: 0.3 % (ref 0–0.5)
IMM GRANULOCYTES NFR BLD AUTO: 0.4 % (ref 0–0.5)
IMM GRANULOCYTES NFR BLD AUTO: 0.5 % (ref 0–0.5)
IMM GRANULOCYTES NFR BLD AUTO: 0.6 % (ref 0–0.5)
INHALED O2 CONCENTRATION: 100 %
INR PPP: 1.02 (ref 0.91–1.09)
INR PPP: 1.04 (ref 0.91–1.09)
INR PPP: 1.16 (ref 0.91–1.09)
INR PPP: 1.27 (ref 0.91–1.09)
IPAP: 16
IRON 24H UR-MRATE: 48 MCG/DL (ref 37–145)
IRON SATN MFR SERPL: 25 % (ref 20–50)
KETONES UR QL STRIP: ABNORMAL
KETONES UR QL STRIP: NEGATIVE
KETONES UR QL STRIP: NEGATIVE
LDLC SERPL CALC-MCNC: 60 MG/DL (ref 0–100)
LDLC SERPL CALC-MCNC: 86 MG/DL (ref 0–100)
LDLC/HDLC SERPL: 1.02 {RATIO}
LDLC/HDLC SERPL: 1.46 {RATIO}
LEUKOCYTE ESTERASE UR QL STRIP.AUTO: NEGATIVE
LIPASE SERPL-CCNC: 13 U/L (ref 13–60)
LIPASE SERPL-CCNC: 84 U/L (ref 13–60)
LYMPHOCYTES # BLD AUTO: 0.31 10*3/MM3 (ref 0.7–3.1)
LYMPHOCYTES # BLD AUTO: 0.36 10*3/MM3 (ref 0.7–3.1)
LYMPHOCYTES # BLD AUTO: 0.41 10*3/MM3 (ref 0.7–3.1)
LYMPHOCYTES # BLD AUTO: 0.74 10*3/MM3 (ref 0.7–3.1)
LYMPHOCYTES # BLD AUTO: 0.75 10*3/MM3 (ref 0.7–3.1)
LYMPHOCYTES # BLD AUTO: 0.77 10*3/MM3 (ref 0.7–3.1)
LYMPHOCYTES # BLD AUTO: 0.86 10*3/MM3 (ref 0.7–3.1)
LYMPHOCYTES # BLD AUTO: 0.93 10*3/MM3 (ref 0.7–3.1)
LYMPHOCYTES # BLD AUTO: 0.95 10*3/MM3 (ref 0.7–3.1)
LYMPHOCYTES # BLD AUTO: 1 10*3/MM3 (ref 0.7–3.1)
LYMPHOCYTES # BLD AUTO: 1.04 10*3/MM3 (ref 0.7–3.1)
LYMPHOCYTES # BLD AUTO: 1.07 10*3/MM3 (ref 0.7–3.1)
LYMPHOCYTES # BLD AUTO: 1.11 10*3/MM3 (ref 0.7–3.1)
LYMPHOCYTES # BLD AUTO: 1.14 10*3/MM3 (ref 0.7–3.1)
LYMPHOCYTES # BLD AUTO: 1.2 10*3/MM3 (ref 0.7–3.1)
LYMPHOCYTES # BLD AUTO: 1.22 10*3/MM3 (ref 0.7–3.1)
LYMPHOCYTES # BLD AUTO: 1.25 10*3/MM3 (ref 0.7–3.1)
LYMPHOCYTES # BLD AUTO: 1.34 10*3/MM3 (ref 0.7–3.1)
LYMPHOCYTES # BLD AUTO: 1.34 10*3/MM3 (ref 0.7–3.1)
LYMPHOCYTES # BLD AUTO: 1.56 10*3/MM3 (ref 0.7–3.1)
LYMPHOCYTES NFR BLD AUTO: 11 % (ref 19.6–45.3)
LYMPHOCYTES NFR BLD AUTO: 11.2 % (ref 19.6–45.3)
LYMPHOCYTES NFR BLD AUTO: 12.2 % (ref 19.6–45.3)
LYMPHOCYTES NFR BLD AUTO: 14.3 % (ref 19.6–45.3)
LYMPHOCYTES NFR BLD AUTO: 14.7 % (ref 19.6–45.3)
LYMPHOCYTES NFR BLD AUTO: 15.3 % (ref 19.6–45.3)
LYMPHOCYTES NFR BLD AUTO: 16.7 % (ref 19.6–45.3)
LYMPHOCYTES NFR BLD AUTO: 17.7 % (ref 19.6–45.3)
LYMPHOCYTES NFR BLD AUTO: 17.8 % (ref 19.6–45.3)
LYMPHOCYTES NFR BLD AUTO: 19.4 % (ref 19.6–45.3)
LYMPHOCYTES NFR BLD AUTO: 19.5 % (ref 19.6–45.3)
LYMPHOCYTES NFR BLD AUTO: 20 % (ref 19.6–45.3)
LYMPHOCYTES NFR BLD AUTO: 20.1 % (ref 19.6–45.3)
LYMPHOCYTES NFR BLD AUTO: 23.1 % (ref 19.6–45.3)
LYMPHOCYTES NFR BLD AUTO: 23.9 % (ref 19.6–45.3)
LYMPHOCYTES NFR BLD AUTO: 24.5 % (ref 19.6–45.3)
LYMPHOCYTES NFR BLD AUTO: 25.4 % (ref 19.6–45.3)
LYMPHOCYTES NFR BLD AUTO: 4.9 % (ref 19.6–45.3)
LYMPHOCYTES NFR BLD AUTO: 5 % (ref 19.6–45.3)
LYMPHOCYTES NFR BLD AUTO: 5.3 % (ref 19.6–45.3)
Lab: ABNORMAL
MAGNESIUM SERPL-MCNC: 1.7 MG/DL (ref 1.6–2.6)
MAGNESIUM SERPL-MCNC: 1.8 MG/DL (ref 1.6–2.6)
MAGNESIUM SERPL-MCNC: 1.9 MG/DL (ref 1.6–2.6)
MAGNESIUM SERPL-MCNC: 2.1 MG/DL (ref 1.6–2.6)
MAGNESIUM SERPL-MCNC: 2.1 MG/DL (ref 1.6–2.6)
MAGNESIUM SERPL-MCNC: 2.2 MG/DL (ref 1.6–2.6)
MAGNESIUM SERPL-MCNC: 2.3 MG/DL (ref 1.6–2.6)
MAGNESIUM SERPL-MCNC: 2.4 MG/DL (ref 1.6–2.6)
MAGNESIUM SERPL-MCNC: 2.4 MG/DL (ref 1.6–2.6)
MAGNESIUM SERPL-MCNC: 2.6 MG/DL (ref 1.6–2.6)
MAGNESIUM SERPL-MCNC: 2.7 MG/DL (ref 1.6–2.6)
MCH RBC QN AUTO: 28.4 PG (ref 26.6–33)
MCH RBC QN AUTO: 28.7 PG (ref 26.6–33)
MCH RBC QN AUTO: 28.8 PG (ref 26.6–33)
MCH RBC QN AUTO: 28.9 PG (ref 26.6–33)
MCH RBC QN AUTO: 28.9 PG (ref 26.6–33)
MCH RBC QN AUTO: 29.1 PG (ref 26.6–33)
MCH RBC QN AUTO: 29.2 PG (ref 26.6–33)
MCH RBC QN AUTO: 29.3 PG (ref 26.6–33)
MCH RBC QN AUTO: 29.4 PG (ref 26.6–33)
MCH RBC QN AUTO: 29.5 PG (ref 26.6–33)
MCH RBC QN AUTO: 29.6 PG (ref 26.6–33)
MCH RBC QN AUTO: 29.6 PG (ref 26.6–33)
MCH RBC QN AUTO: 29.7 PG (ref 26.6–33)
MCH RBC QN AUTO: 29.9 PG (ref 26.6–33)
MCH RBC QN AUTO: 30.2 PG (ref 26.6–33)
MCH RBC QN AUTO: 30.4 PG (ref 26.6–33)
MCHC RBC AUTO-ENTMCNC: 30.2 G/DL (ref 31.5–35.7)
MCHC RBC AUTO-ENTMCNC: 30.3 G/DL (ref 31.5–35.7)
MCHC RBC AUTO-ENTMCNC: 30.4 G/DL (ref 31.5–35.7)
MCHC RBC AUTO-ENTMCNC: 30.4 G/DL (ref 31.5–35.7)
MCHC RBC AUTO-ENTMCNC: 30.6 G/DL (ref 31.5–35.7)
MCHC RBC AUTO-ENTMCNC: 30.6 G/DL (ref 31.5–35.7)
MCHC RBC AUTO-ENTMCNC: 30.7 G/DL (ref 31.5–35.7)
MCHC RBC AUTO-ENTMCNC: 30.8 G/DL (ref 31.5–35.7)
MCHC RBC AUTO-ENTMCNC: 31.3 G/DL (ref 31.5–35.7)
MCHC RBC AUTO-ENTMCNC: 31.6 G/DL (ref 31.5–35.7)
MCHC RBC AUTO-ENTMCNC: 31.7 G/DL (ref 31.5–35.7)
MCHC RBC AUTO-ENTMCNC: 31.8 G/DL (ref 31.5–35.7)
MCHC RBC AUTO-ENTMCNC: 32.2 G/DL (ref 31.5–35.7)
MCHC RBC AUTO-ENTMCNC: 32.2 G/DL (ref 31.5–35.7)
MCHC RBC AUTO-ENTMCNC: 32.3 G/DL (ref 31.5–35.7)
MCHC RBC AUTO-ENTMCNC: 32.4 G/DL (ref 31.5–35.7)
MCHC RBC AUTO-ENTMCNC: 32.5 G/DL (ref 31.5–35.7)
MCHC RBC AUTO-ENTMCNC: 32.6 G/DL (ref 31.5–35.7)
MCHC RBC AUTO-ENTMCNC: 34 G/DL (ref 31.5–35.7)
MCHC RBC AUTO-ENTMCNC: 34.1 G/DL (ref 31.5–35.7)
MCHC RBC AUTO-ENTMCNC: 34.8 G/DL (ref 31.5–35.7)
MCV RBC AUTO: 84.3 FL (ref 79–97)
MCV RBC AUTO: 84.5 FL (ref 79–97)
MCV RBC AUTO: 86.3 FL (ref 79–97)
MCV RBC AUTO: 89.6 FL (ref 79–97)
MCV RBC AUTO: 90.1 FL (ref 79–97)
MCV RBC AUTO: 90.3 FL (ref 79–97)
MCV RBC AUTO: 90.9 FL (ref 79–97)
MCV RBC AUTO: 91 FL (ref 79–97)
MCV RBC AUTO: 91.6 FL (ref 79–97)
MCV RBC AUTO: 92.2 FL (ref 79–97)
MCV RBC AUTO: 92.5 FL (ref 79–97)
MCV RBC AUTO: 92.9 FL (ref 79–97)
MCV RBC AUTO: 93 FL (ref 79–97)
MCV RBC AUTO: 93.2 FL (ref 79–97)
MCV RBC AUTO: 93.3 FL (ref 79–97)
MCV RBC AUTO: 93.7 FL (ref 79–97)
MCV RBC AUTO: 93.9 FL (ref 79–97)
MCV RBC AUTO: 94.1 FL (ref 79–97)
MCV RBC AUTO: 94.6 FL (ref 79–97)
MCV RBC AUTO: 94.7 FL (ref 79–97)
MCV RBC AUTO: 95.9 FL (ref 79–97)
MCV RBC AUTO: 96.3 FL (ref 79–97)
MCV RBC AUTO: 96.4 FL (ref 79–97)
MCV RBC AUTO: 97.1 FL (ref 79–97)
MCV RBC AUTO: 98.3 FL (ref 79–97)
METHADONE UR QL SCN: NEGATIVE
METHGB BLD QL: 0.6 % (ref 0–3)
METHGB BLD QL: 1 % (ref 0–3)
MODALITY: ABNORMAL
MONOCYTES # BLD AUTO: 0.15 10*3/MM3 (ref 0.1–0.9)
MONOCYTES # BLD AUTO: 0.35 10*3/MM3 (ref 0.1–0.9)
MONOCYTES # BLD AUTO: 0.35 10*3/MM3 (ref 0.1–0.9)
MONOCYTES # BLD AUTO: 0.42 10*3/MM3 (ref 0.1–0.9)
MONOCYTES # BLD AUTO: 0.45 10*3/MM3 (ref 0.1–0.9)
MONOCYTES # BLD AUTO: 0.49 10*3/MM3 (ref 0.1–0.9)
MONOCYTES # BLD AUTO: 0.49 10*3/MM3 (ref 0.1–0.9)
MONOCYTES # BLD AUTO: 0.5 10*3/MM3 (ref 0.1–0.9)
MONOCYTES # BLD AUTO: 0.54 10*3/MM3 (ref 0.1–0.9)
MONOCYTES # BLD AUTO: 0.54 10*3/MM3 (ref 0.1–0.9)
MONOCYTES # BLD AUTO: 0.56 10*3/MM3 (ref 0.1–0.9)
MONOCYTES # BLD AUTO: 0.61 10*3/MM3 (ref 0.1–0.9)
MONOCYTES # BLD AUTO: 0.63 10*3/MM3 (ref 0.1–0.9)
MONOCYTES # BLD AUTO: 0.64 10*3/MM3 (ref 0.1–0.9)
MONOCYTES # BLD AUTO: 0.68 10*3/MM3 (ref 0.1–0.9)
MONOCYTES # BLD AUTO: 0.7 10*3/MM3 (ref 0.1–0.9)
MONOCYTES # BLD AUTO: 0.74 10*3/MM3 (ref 0.1–0.9)
MONOCYTES # BLD AUTO: 0.76 10*3/MM3 (ref 0.1–0.9)
MONOCYTES # BLD AUTO: 0.94 10*3/MM3 (ref 0.1–0.9)
MONOCYTES # BLD AUTO: 1 10*3/MM3 (ref 0.1–0.9)
MONOCYTES NFR BLD AUTO: 10.1 % (ref 5–12)
MONOCYTES NFR BLD AUTO: 10.4 % (ref 5–12)
MONOCYTES NFR BLD AUTO: 10.5 % (ref 5–12)
MONOCYTES NFR BLD AUTO: 10.6 % (ref 5–12)
MONOCYTES NFR BLD AUTO: 11.1 % (ref 5–12)
MONOCYTES NFR BLD AUTO: 11.4 % (ref 5–12)
MONOCYTES NFR BLD AUTO: 11.6 % (ref 5–12)
MONOCYTES NFR BLD AUTO: 13.1 % (ref 5–12)
MONOCYTES NFR BLD AUTO: 13.4 % (ref 5–12)
MONOCYTES NFR BLD AUTO: 14.3 % (ref 5–12)
MONOCYTES NFR BLD AUTO: 2.1 % (ref 5–12)
MONOCYTES NFR BLD AUTO: 4.5 % (ref 5–12)
MONOCYTES NFR BLD AUTO: 6.4 % (ref 5–12)
MONOCYTES NFR BLD AUTO: 6.5 % (ref 5–12)
MONOCYTES NFR BLD AUTO: 7.9 % (ref 5–12)
MONOCYTES NFR BLD AUTO: 8 % (ref 5–12)
MONOCYTES NFR BLD AUTO: 8.3 % (ref 5–12)
MONOCYTES NFR BLD AUTO: 8.8 % (ref 5–12)
MONOCYTES NFR BLD AUTO: 9.3 % (ref 5–12)
MONOCYTES NFR BLD AUTO: 9.4 % (ref 5–12)
NEUTROPHILS NFR BLD AUTO: 2.36 10*3/MM3 (ref 1.7–7)
NEUTROPHILS NFR BLD AUTO: 2.58 10*3/MM3 (ref 1.7–7)
NEUTROPHILS NFR BLD AUTO: 2.61 10*3/MM3 (ref 1.7–7)
NEUTROPHILS NFR BLD AUTO: 2.89 10*3/MM3 (ref 1.7–7)
NEUTROPHILS NFR BLD AUTO: 3.14 10*3/MM3 (ref 1.7–7)
NEUTROPHILS NFR BLD AUTO: 3.15 10*3/MM3 (ref 1.7–7)
NEUTROPHILS NFR BLD AUTO: 3.15 10*3/MM3 (ref 1.7–7)
NEUTROPHILS NFR BLD AUTO: 3.42 10*3/MM3 (ref 1.7–7)
NEUTROPHILS NFR BLD AUTO: 3.62 10*3/MM3 (ref 1.7–7)
NEUTROPHILS NFR BLD AUTO: 4.04 10*3/MM3 (ref 1.7–7)
NEUTROPHILS NFR BLD AUTO: 4.08 10*3/MM3 (ref 1.7–7)
NEUTROPHILS NFR BLD AUTO: 5.25 10*3/MM3 (ref 1.7–7)
NEUTROPHILS NFR BLD AUTO: 5.33 10*3/MM3 (ref 1.7–7)
NEUTROPHILS NFR BLD AUTO: 5.51 10*3/MM3 (ref 1.7–7)
NEUTROPHILS NFR BLD AUTO: 5.53 10*3/MM3 (ref 1.7–7)
NEUTROPHILS NFR BLD AUTO: 54.2 % (ref 42.7–76)
NEUTROPHILS NFR BLD AUTO: 54.8 % (ref 42.7–76)
NEUTROPHILS NFR BLD AUTO: 58.8 % (ref 42.7–76)
NEUTROPHILS NFR BLD AUTO: 58.8 % (ref 42.7–76)
NEUTROPHILS NFR BLD AUTO: 59.3 % (ref 42.7–76)
NEUTROPHILS NFR BLD AUTO: 6.01 10*3/MM3 (ref 1.7–7)
NEUTROPHILS NFR BLD AUTO: 6.7 10*3/MM3 (ref 1.7–7)
NEUTROPHILS NFR BLD AUTO: 6.87 10*3/MM3 (ref 1.7–7)
NEUTROPHILS NFR BLD AUTO: 6.99 10*3/MM3 (ref 1.7–7)
NEUTROPHILS NFR BLD AUTO: 60.4 % (ref 42.7–76)
NEUTROPHILS NFR BLD AUTO: 61.8 % (ref 42.7–76)
NEUTROPHILS NFR BLD AUTO: 63.7 % (ref 42.7–76)
NEUTROPHILS NFR BLD AUTO: 67.2 % (ref 42.7–76)
NEUTROPHILS NFR BLD AUTO: 68.8 % (ref 42.7–76)
NEUTROPHILS NFR BLD AUTO: 69.8 % (ref 42.7–76)
NEUTROPHILS NFR BLD AUTO: 7.61 10*3/MM3 (ref 1.7–7)
NEUTROPHILS NFR BLD AUTO: 70.8 % (ref 42.7–76)
NEUTROPHILS NFR BLD AUTO: 73.4 % (ref 42.7–76)
NEUTROPHILS NFR BLD AUTO: 75.3 % (ref 42.7–76)
NEUTROPHILS NFR BLD AUTO: 75.8 % (ref 42.7–76)
NEUTROPHILS NFR BLD AUTO: 78 % (ref 42.7–76)
NEUTROPHILS NFR BLD AUTO: 79 % (ref 42.7–76)
NEUTROPHILS NFR BLD AUTO: 84.6 % (ref 42.7–76)
NEUTROPHILS NFR BLD AUTO: 88.7 % (ref 42.7–76)
NEUTROPHILS NFR BLD AUTO: 92.1 % (ref 42.7–76)
NITRITE UR QL STRIP: NEGATIVE
NOTE: ABNORMAL
NOTE: ABNORMAL
NRBC BLD AUTO-RTO: 0 /100 WBC (ref 0–0.2)
NT-PROBNP SERPL-MCNC: ABNORMAL PG/ML (ref 0–900)
OPIATES UR QL: NEGATIVE
OPIATES UR QL: NEGATIVE
OPIATES UR QL: POSITIVE
OSMOLALITY SERPL: 267 MOSM/KG (ref 275–295)
OSMOLALITY SERPL: 331 MOSM/KG (ref 275–295)
OSMOLALITY SERPL: 333 MOSM/KG (ref 275–295)
OXYCODONE UR QL SCN: NEGATIVE
OXYHGB MFR BLDV: 93.2 % (ref 94–99)
OXYHGB MFR BLDV: 94.2 % (ref 94–99)
PCO2 BLDA: 32 MM HG (ref 35–45)
PCO2 BLDA: 34.5 MM HG (ref 35–45)
PCO2 BLDA: 36.8 MM HG (ref 35–45)
PCO2 BLDA: 45.1 MM HG (ref 35–45)
PCO2 BLDV: 50.3 MM HG (ref 41–51)
PCO2 TEMP ADJ BLD: 32 MM HG (ref 35–45)
PCO2 TEMP ADJ BLD: 34.5 MM HG (ref 35–45)
PCO2 TEMP ADJ BLD: 36.8 MM HG (ref 35–45)
PCO2 TEMP ADJ BLD: 45.1 MM HG (ref 35–45)
PCP UR QL SCN: NEGATIVE
PH BLDA: 7.26 PH UNITS (ref 7.35–7.45)
PH BLDA: 7.37 PH UNITS (ref 7.35–7.45)
PH BLDA: 7.38 PH UNITS (ref 7.35–7.45)
PH BLDA: 7.41 PH UNITS (ref 7.35–7.45)
PH BLDV: 7.4 PH UNITS (ref 7.32–7.42)
PH UR STRIP.AUTO: 5.5 [PH] (ref 5–8)
PH UR STRIP.AUTO: 6 [PH] (ref 5–8)
PH UR STRIP.AUTO: 7 [PH] (ref 5–8)
PH, TEMP CORRECTED: 7.26 PH UNITS (ref 7.35–7.45)
PH, TEMP CORRECTED: 7.37 PH UNITS (ref 7.35–7.45)
PH, TEMP CORRECTED: 7.38 PH UNITS (ref 7.35–7.45)
PH, TEMP CORRECTED: 7.41 PH UNITS (ref 7.35–7.45)
PHOSPHATE SERPL-MCNC: 2.1 MG/DL (ref 2.5–4.5)
PHOSPHATE SERPL-MCNC: 2.6 MG/DL (ref 2.5–4.5)
PHOSPHATE SERPL-MCNC: 3.4 MG/DL (ref 2.5–4.5)
PHOSPHATE SERPL-MCNC: 3.5 MG/DL (ref 2.5–4.5)
PHOSPHATE SERPL-MCNC: 3.5 MG/DL (ref 2.5–4.5)
PHOSPHATE SERPL-MCNC: 3.6 MG/DL (ref 2.5–4.5)
PHOSPHATE SERPL-MCNC: 3.8 MG/DL (ref 2.5–4.5)
PHOSPHATE SERPL-MCNC: 4.2 MG/DL (ref 2.5–4.5)
PHOSPHATE SERPL-MCNC: 5.4 MG/DL (ref 2.5–4.5)
PHOSPHATE SERPL-MCNC: 5.4 MG/DL (ref 2.5–4.5)
PHOSPHATE SERPL-MCNC: 6 MG/DL (ref 2.5–4.5)
PHOSPHATE SERPL-MCNC: 7.2 MG/DL (ref 2.5–4.5)
PLATELET # BLD AUTO: 101 10*3/MM3 (ref 140–450)
PLATELET # BLD AUTO: 101 10*3/MM3 (ref 140–450)
PLATELET # BLD AUTO: 103 10*3/MM3 (ref 140–450)
PLATELET # BLD AUTO: 106 10*3/MM3 (ref 140–450)
PLATELET # BLD AUTO: 116 10*3/MM3 (ref 140–450)
PLATELET # BLD AUTO: 120 10*3/MM3 (ref 140–450)
PLATELET # BLD AUTO: 125 10*3/MM3 (ref 140–450)
PLATELET # BLD AUTO: 128 10*3/MM3 (ref 140–450)
PLATELET # BLD AUTO: 128 10*3/MM3 (ref 140–450)
PLATELET # BLD AUTO: 137 10*3/MM3 (ref 140–450)
PLATELET # BLD AUTO: 148 10*3/MM3 (ref 140–450)
PLATELET # BLD AUTO: 156 10*3/MM3 (ref 140–450)
PLATELET # BLD AUTO: 157 10*3/MM3 (ref 140–450)
PLATELET # BLD AUTO: 160 10*3/MM3 (ref 140–450)
PLATELET # BLD AUTO: 162 10*3/MM3 (ref 140–450)
PLATELET # BLD AUTO: 163 10*3/MM3 (ref 140–450)
PLATELET # BLD AUTO: 170 10*3/MM3 (ref 140–450)
PLATELET # BLD AUTO: 177 10*3/MM3 (ref 140–450)
PLATELET # BLD AUTO: 184 10*3/MM3 (ref 140–450)
PLATELET # BLD AUTO: 185 10*3/MM3 (ref 140–450)
PLATELET # BLD AUTO: 191 10*3/MM3 (ref 140–450)
PLATELET # BLD AUTO: 197 10*3/MM3 (ref 140–450)
PLATELET # BLD AUTO: 217 10*3/MM3 (ref 140–450)
PLATELET # BLD AUTO: 220 10*3/MM3 (ref 140–450)
PLATELET # BLD AUTO: 91 10*3/MM3 (ref 140–450)
PMV BLD AUTO: 10 FL (ref 6–12)
PMV BLD AUTO: 10.3 FL (ref 6–12)
PMV BLD AUTO: 10.3 FL (ref 6–12)
PMV BLD AUTO: 10.4 FL (ref 6–12)
PMV BLD AUTO: 10.8 FL (ref 6–12)
PMV BLD AUTO: 10.8 FL (ref 6–12)
PMV BLD AUTO: 10.9 FL (ref 6–12)
PMV BLD AUTO: 11.1 FL (ref 6–12)
PMV BLD AUTO: 11.1 FL (ref 6–12)
PMV BLD AUTO: 8.9 FL (ref 6–12)
PMV BLD AUTO: 9.3 FL (ref 6–12)
PMV BLD AUTO: 9.4 FL (ref 6–12)
PMV BLD AUTO: 9.5 FL (ref 6–12)
PMV BLD AUTO: 9.5 FL (ref 6–12)
PMV BLD AUTO: 9.6 FL (ref 6–12)
PMV BLD AUTO: 9.7 FL (ref 6–12)
PMV BLD AUTO: 9.7 FL (ref 6–12)
PMV BLD AUTO: 9.8 FL (ref 6–12)
PMV BLD AUTO: 9.8 FL (ref 6–12)
PMV BLD AUTO: 9.9 FL (ref 6–12)
PO2 BLDA: 113 MM HG (ref 83–108)
PO2 BLDA: 61.6 MM HG (ref 83–108)
PO2 BLDA: 71 MM HG (ref 83–108)
PO2 BLDA: 79.1 MM HG (ref 83–108)
PO2 BLDV: 49 MM HG (ref 27–53)
PO2 TEMP ADJ BLD: 113 MM HG (ref 83–108)
PO2 TEMP ADJ BLD: 61.6 MM HG (ref 83–108)
PO2 TEMP ADJ BLD: 71 MM HG (ref 83–108)
PO2 TEMP ADJ BLD: 79.1 MM HG (ref 83–108)
POTASSIUM BLDA-SCNC: 3 MMOL/L (ref 3.5–5.2)
POTASSIUM BLDA-SCNC: 4.9 MMOL/L (ref 3.5–5.2)
POTASSIUM SERPL-SCNC: 2.3 MMOL/L (ref 3.5–5.2)
POTASSIUM SERPL-SCNC: 2.5 MMOL/L (ref 3.5–5.2)
POTASSIUM SERPL-SCNC: 2.9 MMOL/L (ref 3.5–5.2)
POTASSIUM SERPL-SCNC: 3.3 MMOL/L (ref 3.5–5.2)
POTASSIUM SERPL-SCNC: 3.4 MMOL/L (ref 3.5–5.2)
POTASSIUM SERPL-SCNC: 3.5 MMOL/L (ref 3.5–5.2)
POTASSIUM SERPL-SCNC: 3.6 MMOL/L (ref 3.5–5.2)
POTASSIUM SERPL-SCNC: 3.8 MMOL/L (ref 3.5–5.2)
POTASSIUM SERPL-SCNC: 3.8 MMOL/L (ref 3.5–5.2)
POTASSIUM SERPL-SCNC: 3.9 MMOL/L (ref 3.5–5.2)
POTASSIUM SERPL-SCNC: 4.1 MMOL/L (ref 3.5–5.2)
POTASSIUM SERPL-SCNC: 4.2 MMOL/L (ref 3.5–5.2)
POTASSIUM SERPL-SCNC: 4.2 MMOL/L (ref 3.5–5.2)
POTASSIUM SERPL-SCNC: 4.3 MMOL/L (ref 3.5–5.2)
POTASSIUM SERPL-SCNC: 4.4 MMOL/L (ref 3.5–5.2)
POTASSIUM SERPL-SCNC: 4.5 MMOL/L (ref 3.5–5.2)
POTASSIUM SERPL-SCNC: 4.7 MMOL/L (ref 3.5–5.2)
POTASSIUM SERPL-SCNC: 4.8 MMOL/L (ref 3.5–5.2)
POTASSIUM SERPL-SCNC: 4.9 MMOL/L (ref 3.5–5.2)
POTASSIUM SERPL-SCNC: 5 MMOL/L (ref 3.5–5.2)
POTASSIUM SERPL-SCNC: 5.1 MMOL/L (ref 3.5–5.2)
POTASSIUM SERPL-SCNC: 5.2 MMOL/L (ref 3.5–5.2)
POTASSIUM SERPL-SCNC: 5.3 MMOL/L (ref 3.5–5.2)
POTASSIUM SERPL-SCNC: 5.6 MMOL/L (ref 3.5–5.2)
POTASSIUM SERPL-SCNC: 7.3 MMOL/L (ref 3.5–5.2)
PROCALCITONIN SERPL-MCNC: 0.4 NG/ML (ref 0–0.25)
PROCALCITONIN SERPL-MCNC: 0.42 NG/ML (ref 0–0.25)
PROPOXYPH UR QL: NEGATIVE
PROT SERPL-MCNC: 5.7 G/DL (ref 6–8.5)
PROT SERPL-MCNC: 5.9 G/DL (ref 6–8.5)
PROT SERPL-MCNC: 6.1 G/DL (ref 6–8.5)
PROT SERPL-MCNC: 6.2 G/DL (ref 6–8.5)
PROT SERPL-MCNC: 6.3 G/DL (ref 6–8.5)
PROT SERPL-MCNC: 6.4 G/DL (ref 6–8.5)
PROT SERPL-MCNC: 6.4 G/DL (ref 6–8.5)
PROT SERPL-MCNC: 6.6 G/DL (ref 6–8.5)
PROT SERPL-MCNC: 6.7 G/DL (ref 6–8.5)
PROT SERPL-MCNC: 7 G/DL (ref 6–8.5)
PROT SERPL-MCNC: 7.1 G/DL (ref 6–8.5)
PROT SERPL-MCNC: 7.2 G/DL (ref 6–8.5)
PROT SERPL-MCNC: 7.3 G/DL (ref 6–8.5)
PROT SERPL-MCNC: 7.8 G/DL (ref 6–8.5)
PROT UR QL STRIP: ABNORMAL
PROTHROMBIN TIME: 12.6 SECONDS (ref 11.5–13.4)
PROTHROMBIN TIME: 13.2 SECONDS (ref 11.9–14.6)
PROTHROMBIN TIME: 14.4 SECONDS (ref 11.9–14.6)
PROTHROMBIN TIME: 15.4 SECONDS (ref 11.9–14.6)
QT INTERVAL: 394 MS
QT INTERVAL: 396 MS
QT INTERVAL: 424 MS
QT INTERVAL: 444 MS
QT INTERVAL: 450 MS
QT INTERVAL: 492 MS
QT INTERVAL: 514 MS
QT INTERVAL: 610 MS
QTC INTERVAL: 471 MS
QTC INTERVAL: 474 MS
QTC INTERVAL: 489 MS
QTC INTERVAL: 492 MS
QTC INTERVAL: 495 MS
QTC INTERVAL: 504 MS
QTC INTERVAL: 519 MS
QTC INTERVAL: 583 MS
RBC # BLD AUTO: 2.38 10*6/MM3 (ref 3.77–5.28)
RBC # BLD AUTO: 2.39 10*6/MM3 (ref 3.77–5.28)
RBC # BLD AUTO: 2.44 10*6/MM3 (ref 3.77–5.28)
RBC # BLD AUTO: 2.45 10*6/MM3 (ref 3.77–5.28)
RBC # BLD AUTO: 2.47 10*6/MM3 (ref 3.77–5.28)
RBC # BLD AUTO: 2.78 10*6/MM3 (ref 3.77–5.28)
RBC # BLD AUTO: 2.79 10*6/MM3 (ref 3.77–5.28)
RBC # BLD AUTO: 2.86 10*6/MM3 (ref 3.77–5.28)
RBC # BLD AUTO: 2.93 10*6/MM3 (ref 3.77–5.28)
RBC # BLD AUTO: 2.95 10*6/MM3 (ref 3.77–5.28)
RBC # BLD AUTO: 2.96 10*6/MM3 (ref 3.77–5.28)
RBC # BLD AUTO: 2.99 10*6/MM3 (ref 3.77–5.28)
RBC # BLD AUTO: 3.08 10*6/MM3 (ref 3.77–5.28)
RBC # BLD AUTO: 3.1 10*6/MM3 (ref 3.77–5.28)
RBC # BLD AUTO: 3.26 10*6/MM3 (ref 3.77–5.28)
RBC # BLD AUTO: 3.28 10*6/MM3 (ref 3.77–5.28)
RBC # BLD AUTO: 3.41 10*6/MM3 (ref 3.77–5.28)
RBC # BLD AUTO: 3.52 10*6/MM3 (ref 3.77–5.28)
RBC # BLD AUTO: 3.62 10*6/MM3 (ref 3.77–5.28)
RBC # BLD AUTO: 3.7 10*6/MM3 (ref 3.77–5.28)
RBC # BLD AUTO: 3.78 10*6/MM3 (ref 3.77–5.28)
RBC # BLD AUTO: 3.81 10*6/MM3 (ref 3.77–5.28)
RBC # BLD AUTO: 3.83 10*6/MM3 (ref 3.77–5.28)
RBC # BLD AUTO: 3.94 10*6/MM3 (ref 3.77–5.28)
RBC # BLD AUTO: 3.95 10*6/MM3 (ref 3.77–5.28)
RBC # UR: ABNORMAL /HPF
REF LAB TEST METHOD: ABNORMAL
RH BLD: POSITIVE
RH BLD: POSITIVE
SALICYLATES SERPL-MCNC: <0.3 MG/DL
SAO2 % BLDCOA: 89.8 % (ref 94–99)
SAO2 % BLDCOA: 93.6 % (ref 94–99)
SAO2 % BLDCOA: 95 % (ref 94–99)
SAO2 % BLDCOA: 99.1 % (ref 94–99)
SAO2 % BLDCOV: 83.3 % (ref 45–75)
SARS-COV-2 ORF1AB RESP QL NAA+PROBE: NOT DETECTED
SARS-COV-2 RNA PNL SPEC NAA+PROBE: NOT DETECTED
SODIUM BLDA-SCNC: 134 MMOL/L (ref 136–145)
SODIUM BLDA-SCNC: 135 MMOL/L (ref 136–145)
SODIUM SERPL-SCNC: 104 MMOL/L (ref 136–145)
SODIUM SERPL-SCNC: 108 MMOL/L (ref 136–145)
SODIUM SERPL-SCNC: 110 MMOL/L (ref 136–145)
SODIUM SERPL-SCNC: 110 MMOL/L (ref 136–145)
SODIUM SERPL-SCNC: 111 MMOL/L (ref 136–145)
SODIUM SERPL-SCNC: 112 MMOL/L (ref 136–145)
SODIUM SERPL-SCNC: 114 MMOL/L (ref 136–145)
SODIUM SERPL-SCNC: 117 MMOL/L (ref 136–145)
SODIUM SERPL-SCNC: 121 MMOL/L (ref 136–145)
SODIUM SERPL-SCNC: 122 MMOL/L (ref 136–145)
SODIUM SERPL-SCNC: 125 MMOL/L (ref 136–145)
SODIUM SERPL-SCNC: 127 MMOL/L (ref 136–145)
SODIUM SERPL-SCNC: 127 MMOL/L (ref 136–145)
SODIUM SERPL-SCNC: 128 MMOL/L (ref 136–145)
SODIUM SERPL-SCNC: 129 MMOL/L (ref 136–145)
SODIUM SERPL-SCNC: 129 MMOL/L (ref 136–145)
SODIUM SERPL-SCNC: 131 MMOL/L (ref 136–145)
SODIUM SERPL-SCNC: 131 MMOL/L (ref 136–145)
SODIUM SERPL-SCNC: 132 MMOL/L (ref 136–145)
SODIUM SERPL-SCNC: 133 MMOL/L (ref 136–145)
SODIUM SERPL-SCNC: 134 MMOL/L (ref 136–145)
SODIUM SERPL-SCNC: 136 MMOL/L (ref 136–145)
SODIUM SERPL-SCNC: 136 MMOL/L (ref 136–145)
SODIUM SERPL-SCNC: 137 MMOL/L (ref 136–145)
SODIUM SERPL-SCNC: 138 MMOL/L (ref 136–145)
SODIUM SERPL-SCNC: 138 MMOL/L (ref 136–145)
SODIUM SERPL-SCNC: 141 MMOL/L (ref 136–145)
SP GR UR STRIP: 1.02 (ref 1–1.03)
SQUAMOUS #/AREA URNS HPF: ABNORMAL /HPF
T&S EXPIRATION DATE: NORMAL
T&S EXPIRATION DATE: NORMAL
T4 FREE SERPL-MCNC: 0.77 NG/DL (ref 0.93–1.7)
T4 FREE SERPL-MCNC: 0.9 NG/DL (ref 0.93–1.7)
T4 FREE SERPL-MCNC: 1.01 NG/DL (ref 0.93–1.7)
TIBC SERPL-MCNC: 189 MCG/DL (ref 298–536)
TRANSFERRIN SERPL-MCNC: 127 MG/DL (ref 200–360)
TRICYCLICS UR QL SCN: NEGATIVE
TRIGL SERPL-MCNC: 43 MG/DL (ref 0–150)
TRIGL SERPL-MCNC: 60 MG/DL (ref 0–150)
TROPONIN T SERPL-MCNC: 0.14 NG/ML (ref 0–0.03)
TROPONIN T SERPL-MCNC: 0.15 NG/ML (ref 0–0.03)
TROPONIN T SERPL-MCNC: 0.17 NG/ML (ref 0–0.03)
TROPONIN T SERPL-MCNC: 0.17 NG/ML (ref 0–0.03)
TROPONIN T SERPL-MCNC: 0.19 NG/ML (ref 0–0.03)
TROPONIN T SERPL-MCNC: 0.19 NG/ML (ref 0–0.03)
TSH SERPL DL<=0.05 MIU/L-ACNC: 20.33 UIU/ML (ref 0.27–4.2)
TSH SERPL DL<=0.05 MIU/L-ACNC: 20.33 UIU/ML (ref 0.27–4.2)
TSH SERPL DL<=0.05 MIU/L-ACNC: 31.56 UIU/ML (ref 0.27–4.2)
TSH SERPL DL<=0.05 MIU/L-ACNC: 7.37 UIU/ML (ref 0.27–4.2)
UROBILINOGEN UR QL STRIP: ABNORMAL
VENTILATOR MODE: ABNORMAL
VIT B12 BLD-MCNC: 536 PG/ML (ref 211–946)
VLDLC SERPL-MCNC: 10 MG/DL (ref 5–40)
VLDLC SERPL-MCNC: 12 MG/DL (ref 5–40)
WBC # BLD AUTO: 10.03 10*3/MM3 (ref 3.4–10.8)
WBC # BLD AUTO: 3.65 10*3/MM3 (ref 3.4–10.8)
WBC # BLD AUTO: 3.95 10*3/MM3 (ref 3.4–10.8)
WBC # BLD AUTO: 4.02 10*3/MM3 (ref 3.4–10.8)
WBC # BLD AUTO: 4.41 10*3/MM3 (ref 3.4–10.8)
WBC # BLD AUTO: 4.76 10*3/MM3 (ref 3.4–10.8)
WBC # BLD AUTO: 4.95 10*3/MM3 (ref 3.4–10.8)
WBC # BLD AUTO: 5.1 10*3/MM3 (ref 3.4–10.8)
WBC # BLD AUTO: 5.27 10*3/MM3 (ref 3.4–10.8)
WBC # BLD AUTO: 5.35 10*3/MM3 (ref 3.4–10.8)
WBC # BLD AUTO: 5.37 10*3/MM3 (ref 3.4–10.8)
WBC # BLD AUTO: 5.37 10*3/MM3 (ref 3.4–10.8)
WBC # BLD AUTO: 6 10*3/MM3 (ref 3.4–10.8)
WBC # BLD AUTO: 6.06 10*3/MM3 (ref 3.4–10.8)
WBC # BLD AUTO: 6.21 10*3/MM3 (ref 3.4–10.8)
WBC # BLD AUTO: 6.75 10*3/MM3 (ref 3.4–10.8)
WBC # BLD AUTO: 6.79 10*3/MM3 (ref 3.4–10.8)
WBC # BLD AUTO: 7.28 10*3/MM3 (ref 3.4–10.8)
WBC # BLD AUTO: 7.53 10*3/MM3 (ref 3.4–10.8)
WBC # BLD AUTO: 7.74 10*3/MM3 (ref 3.4–10.8)
WBC # BLD AUTO: 7.78 10*3/MM3 (ref 3.4–10.8)
WBC # BLD AUTO: 7.78 10*3/MM3 (ref 3.4–10.8)
WBC # BLD AUTO: 8.75 10*3/MM3 (ref 3.4–10.8)
WBC # BLD AUTO: 8.95 10*3/MM3 (ref 3.4–10.8)
WBC NRBC COR # BLD: 4.5 10*3/MM3 (ref 3.4–10.8)
WBC UR QL AUTO: ABNORMAL /HPF
WHOLE BLOOD HOLD SPECIMEN: NORMAL

## 2021-01-01 PROCEDURE — 80048 BASIC METABOLIC PNL TOTAL CA: CPT | Performed by: INTERNAL MEDICINE

## 2021-01-01 PROCEDURE — 63710000001 INSULIN LISPRO (HUMAN) PER 5 UNITS: Performed by: NURSE PRACTITIONER

## 2021-01-01 PROCEDURE — 85025 COMPLETE CBC W/AUTO DIFF WBC: CPT | Performed by: FAMILY MEDICINE

## 2021-01-01 PROCEDURE — 84703 CHORIONIC GONADOTROPIN ASSAY: CPT | Performed by: FAMILY MEDICINE

## 2021-01-01 PROCEDURE — 63710000001 INSULIN REGULAR HUMAN PER 5 UNITS: Performed by: EMERGENCY MEDICINE

## 2021-01-01 PROCEDURE — 5A1D70Z PERFORMANCE OF URINARY FILTRATION, INTERMITTENT, LESS THAN 6 HOURS PER DAY: ICD-10-PCS | Performed by: FAMILY MEDICINE

## 2021-01-01 PROCEDURE — 25010000002 ENOXAPARIN PER 10 MG: Performed by: FAMILY MEDICINE

## 2021-01-01 PROCEDURE — 85027 COMPLETE CBC AUTOMATED: CPT | Performed by: INTERNAL MEDICINE

## 2021-01-01 PROCEDURE — 83880 ASSAY OF NATRIURETIC PEPTIDE: CPT | Performed by: EMERGENCY MEDICINE

## 2021-01-01 PROCEDURE — 0 IOPAMIDOL PER 1 ML: Performed by: INTERNAL MEDICINE

## 2021-01-01 PROCEDURE — 83735 ASSAY OF MAGNESIUM: CPT | Performed by: PHYSICIAN ASSISTANT

## 2021-01-01 PROCEDURE — 84466 ASSAY OF TRANSFERRIN: CPT | Performed by: FAMILY MEDICINE

## 2021-01-01 PROCEDURE — 83690 ASSAY OF LIPASE: CPT | Performed by: EMERGENCY MEDICINE

## 2021-01-01 PROCEDURE — 25010000003 INSULIN REGULAR HUMAN PER 5 UNITS: Performed by: EMERGENCY MEDICINE

## 2021-01-01 PROCEDURE — 63710000001 INSULIN LISPRO (HUMAN) PER 5 UNITS: Performed by: FAMILY MEDICINE

## 2021-01-01 PROCEDURE — 97535 SELF CARE MNGMENT TRAINING: CPT | Performed by: OCCUPATIONAL THERAPIST

## 2021-01-01 PROCEDURE — 63710000001 INSULIN LISPRO (HUMAN) PER 5 UNITS: Performed by: INTERNAL MEDICINE

## 2021-01-01 PROCEDURE — 25010000002 ONDANSETRON PER 1 MG: Performed by: INTERNAL MEDICINE

## 2021-01-01 PROCEDURE — 87040 BLOOD CULTURE FOR BACTERIA: CPT | Performed by: PHYSICIAN ASSISTANT

## 2021-01-01 PROCEDURE — 99232 SBSQ HOSP IP/OBS MODERATE 35: CPT | Performed by: CLINICAL NURSE SPECIALIST

## 2021-01-01 PROCEDURE — 70450 CT HEAD/BRAIN W/O DYE: CPT

## 2021-01-01 PROCEDURE — 63710000001 INSULIN DETEMIR PER 5 UNITS: Performed by: INTERNAL MEDICINE

## 2021-01-01 PROCEDURE — 97163 PT EVAL HIGH COMPLEX 45 MIN: CPT

## 2021-01-01 PROCEDURE — 80053 COMPREHEN METABOLIC PANEL: CPT | Performed by: INTERNAL MEDICINE

## 2021-01-01 PROCEDURE — B548ZZA ULTRASONOGRAPHY OF SUPERIOR VENA CAVA, GUIDANCE: ICD-10-PCS | Performed by: EMERGENCY MEDICINE

## 2021-01-01 PROCEDURE — 97110 THERAPEUTIC EXERCISES: CPT

## 2021-01-01 PROCEDURE — 84484 ASSAY OF TROPONIN QUANT: CPT | Performed by: EMERGENCY MEDICINE

## 2021-01-01 PROCEDURE — 94799 UNLISTED PULMONARY SVC/PX: CPT

## 2021-01-01 PROCEDURE — 85730 THROMBOPLASTIN TIME PARTIAL: CPT | Performed by: EMERGENCY MEDICINE

## 2021-01-01 PROCEDURE — 97535 SELF CARE MNGMENT TRAINING: CPT

## 2021-01-01 PROCEDURE — 92610 EVALUATE SWALLOWING FUNCTION: CPT | Performed by: SPEECH-LANGUAGE PATHOLOGIST

## 2021-01-01 PROCEDURE — 82375 ASSAY CARBOXYHB QUANT: CPT

## 2021-01-01 PROCEDURE — 25010000002 HYDRALAZINE PER 20 MG: Performed by: EMERGENCY MEDICINE

## 2021-01-01 PROCEDURE — 82962 GLUCOSE BLOOD TEST: CPT

## 2021-01-01 PROCEDURE — 25010000002 POTASSIUM CHLORIDE PER 2 MEQ: Performed by: EMERGENCY MEDICINE

## 2021-01-01 PROCEDURE — 84100 ASSAY OF PHOSPHORUS: CPT | Performed by: FAMILY MEDICINE

## 2021-01-01 PROCEDURE — 83605 ASSAY OF LACTIC ACID: CPT | Performed by: PHYSICIAN ASSISTANT

## 2021-01-01 PROCEDURE — 80053 COMPREHEN METABOLIC PANEL: CPT | Performed by: PHYSICIAN ASSISTANT

## 2021-01-01 PROCEDURE — 84443 ASSAY THYROID STIM HORMONE: CPT | Performed by: INTERNAL MEDICINE

## 2021-01-01 PROCEDURE — 82805 BLOOD GASES W/O2 SATURATION: CPT

## 2021-01-01 PROCEDURE — P9612 CATHETERIZE FOR URINE SPEC: HCPCS

## 2021-01-01 PROCEDURE — 97162 PT EVAL MOD COMPLEX 30 MIN: CPT

## 2021-01-01 PROCEDURE — 25010000002 HYDRALAZINE PER 20 MG: Performed by: INTERNAL MEDICINE

## 2021-01-01 PROCEDURE — 93005 ELECTROCARDIOGRAM TRACING: CPT | Performed by: EMERGENCY MEDICINE

## 2021-01-01 PROCEDURE — C1751 CATH, INF, PER/CENT/MIDLINE: HCPCS

## 2021-01-01 PROCEDURE — 25010000002 ONDANSETRON PER 1 MG

## 2021-01-01 PROCEDURE — 93010 ELECTROCARDIOGRAM REPORT: CPT | Performed by: INTERNAL MEDICINE

## 2021-01-01 PROCEDURE — 99285 EMERGENCY DEPT VISIT HI MDM: CPT

## 2021-01-01 PROCEDURE — 80306 DRUG TEST PRSMV INSTRMNT: CPT | Performed by: EMERGENCY MEDICINE

## 2021-01-01 PROCEDURE — 84100 ASSAY OF PHOSPHORUS: CPT | Performed by: EMERGENCY MEDICINE

## 2021-01-01 PROCEDURE — 99232 SBSQ HOSP IP/OBS MODERATE 35: CPT | Performed by: NEUROLOGICAL SURGERY

## 2021-01-01 PROCEDURE — 83735 ASSAY OF MAGNESIUM: CPT | Performed by: EMERGENCY MEDICINE

## 2021-01-01 PROCEDURE — 25010000002 LORAZEPAM PER 2 MG: Performed by: INTERNAL MEDICINE

## 2021-01-01 PROCEDURE — 25010000003 HYDROMORPHONE 1 MG/ML SOLUTION: Performed by: INTERNAL MEDICINE

## 2021-01-01 PROCEDURE — 85610 PROTHROMBIN TIME: CPT | Performed by: EMERGENCY MEDICINE

## 2021-01-01 PROCEDURE — 83735 ASSAY OF MAGNESIUM: CPT | Performed by: INTERNAL MEDICINE

## 2021-01-01 PROCEDURE — 82803 BLOOD GASES ANY COMBINATION: CPT

## 2021-01-01 PROCEDURE — 25010000002 ENOXAPARIN PER 10 MG: Performed by: INTERNAL MEDICINE

## 2021-01-01 PROCEDURE — 80179 DRUG ASSAY SALICYLATE: CPT | Performed by: PHYSICIAN ASSISTANT

## 2021-01-01 PROCEDURE — 02HV33Z INSERTION OF INFUSION DEVICE INTO SUPERIOR VENA CAVA, PERCUTANEOUS APPROACH: ICD-10-PCS | Performed by: EMERGENCY MEDICINE

## 2021-01-01 PROCEDURE — 71045 X-RAY EXAM CHEST 1 VIEW: CPT

## 2021-01-01 PROCEDURE — 85025 COMPLETE CBC W/AUTO DIFF WBC: CPT | Performed by: INTERNAL MEDICINE

## 2021-01-01 PROCEDURE — 63710000001 INSULIN DETEMIR PER 5 UNITS: Performed by: FAMILY MEDICINE

## 2021-01-01 PROCEDURE — 82947 ASSAY GLUCOSE BLOOD QUANT: CPT | Performed by: FAMILY MEDICINE

## 2021-01-01 PROCEDURE — 84145 PROCALCITONIN (PCT): CPT | Performed by: PHYSICIAN ASSISTANT

## 2021-01-01 PROCEDURE — 93005 ELECTROCARDIOGRAM TRACING: CPT

## 2021-01-01 PROCEDURE — 25010000002 HYDROMORPHONE PER 4 MG: Performed by: EMERGENCY MEDICINE

## 2021-01-01 PROCEDURE — 25010000002 ORPHENADRINE CITRATE PER 60 MG: Performed by: INTERNAL MEDICINE

## 2021-01-01 PROCEDURE — 86850 RBC ANTIBODY SCREEN: CPT | Performed by: EMERGENCY MEDICINE

## 2021-01-01 PROCEDURE — 83930 ASSAY OF BLOOD OSMOLALITY: CPT | Performed by: EMERGENCY MEDICINE

## 2021-01-01 PROCEDURE — 81001 URINALYSIS AUTO W/SCOPE: CPT | Performed by: EMERGENCY MEDICINE

## 2021-01-01 PROCEDURE — 84484 ASSAY OF TROPONIN QUANT: CPT | Performed by: INTERNAL MEDICINE

## 2021-01-01 PROCEDURE — 86900 BLOOD TYPING SEROLOGIC ABO: CPT | Performed by: EMERGENCY MEDICINE

## 2021-01-01 PROCEDURE — 97530 THERAPEUTIC ACTIVITIES: CPT

## 2021-01-01 PROCEDURE — 36415 COLL VENOUS BLD VENIPUNCTURE: CPT | Performed by: INTERNAL MEDICINE

## 2021-01-01 PROCEDURE — 84443 ASSAY THYROID STIM HORMONE: CPT | Performed by: FAMILY MEDICINE

## 2021-01-01 PROCEDURE — 25010000002 EPOETIN ALFA-EPBX 3000 UNIT/ML SOLUTION 1 ML VIAL: Performed by: FAMILY MEDICINE

## 2021-01-01 PROCEDURE — 5A1D70Z PERFORMANCE OF URINARY FILTRATION, INTERMITTENT, LESS THAN 6 HOURS PER DAY: ICD-10-PCS | Performed by: INTERNAL MEDICINE

## 2021-01-01 PROCEDURE — 83605 ASSAY OF LACTIC ACID: CPT | Performed by: EMERGENCY MEDICINE

## 2021-01-01 PROCEDURE — 80061 LIPID PANEL: CPT | Performed by: FAMILY MEDICINE

## 2021-01-01 PROCEDURE — 97165 OT EVAL LOW COMPLEX 30 MIN: CPT

## 2021-01-01 PROCEDURE — 25010000002 GLUCAGON (HUMAN RECOMBINANT) 1 MG RECONSTITUTED SOLUTION: Performed by: EMERGENCY MEDICINE

## 2021-01-01 PROCEDURE — 63710000001 INSULIN DETEMIR PER 5 UNITS: Performed by: NURSE PRACTITIONER

## 2021-01-01 PROCEDURE — 84439 ASSAY OF FREE THYROXINE: CPT | Performed by: FAMILY MEDICINE

## 2021-01-01 PROCEDURE — 93010 ELECTROCARDIOGRAM REPORT: CPT | Performed by: EMERGENCY MEDICINE

## 2021-01-01 PROCEDURE — 82077 ASSAY SPEC XCP UR&BREATH IA: CPT | Performed by: PHYSICIAN ASSISTANT

## 2021-01-01 PROCEDURE — 73552 X-RAY EXAM OF FEMUR 2/>: CPT

## 2021-01-01 PROCEDURE — 74177 CT ABD & PELVIS W/CONTRAST: CPT

## 2021-01-01 PROCEDURE — 80053 COMPREHEN METABOLIC PANEL: CPT | Performed by: FAMILY MEDICINE

## 2021-01-01 PROCEDURE — 02HV33Z INSERTION OF INFUSION DEVICE INTO SUPERIOR VENA CAVA, PERCUTANEOUS APPROACH: ICD-10-PCS | Performed by: INTERNAL MEDICINE

## 2021-01-01 PROCEDURE — 87635 SARS-COV-2 COVID-19 AMP PRB: CPT | Performed by: INTERNAL MEDICINE

## 2021-01-01 PROCEDURE — 84439 ASSAY OF FREE THYROXINE: CPT | Performed by: NURSE PRACTITIONER

## 2021-01-01 PROCEDURE — 82009 KETONE BODYS QUAL: CPT | Performed by: PHYSICIAN ASSISTANT

## 2021-01-01 PROCEDURE — 25010000002 NALOXONE PER 1 MG: Performed by: EMERGENCY MEDICINE

## 2021-01-01 PROCEDURE — 82077 ASSAY SPEC XCP UR&BREATH IA: CPT | Performed by: EMERGENCY MEDICINE

## 2021-01-01 PROCEDURE — 86901 BLOOD TYPING SEROLOGIC RH(D): CPT | Performed by: EMERGENCY MEDICINE

## 2021-01-01 PROCEDURE — 94640 AIRWAY INHALATION TREATMENT: CPT

## 2021-01-01 PROCEDURE — 83540 ASSAY OF IRON: CPT | Performed by: FAMILY MEDICINE

## 2021-01-01 PROCEDURE — 36415 COLL VENOUS BLD VENIPUNCTURE: CPT

## 2021-01-01 PROCEDURE — 25010000002 PROCHLORPERAZINE 10 MG/2ML SOLUTION: Performed by: EMERGENCY MEDICINE

## 2021-01-01 PROCEDURE — 25010000002 CEFTRIAXONE PER 250 MG: Performed by: PHYSICIAN ASSISTANT

## 2021-01-01 PROCEDURE — 87635 SARS-COV-2 COVID-19 AMP PRB: CPT | Performed by: EMERGENCY MEDICINE

## 2021-01-01 PROCEDURE — 85025 COMPLETE CBC W/AUTO DIFF WBC: CPT | Performed by: EMERGENCY MEDICINE

## 2021-01-01 PROCEDURE — 83735 ASSAY OF MAGNESIUM: CPT | Performed by: FAMILY MEDICINE

## 2021-01-01 PROCEDURE — 82947 ASSAY GLUCOSE BLOOD QUANT: CPT | Performed by: INTERNAL MEDICINE

## 2021-01-01 PROCEDURE — 80048 BASIC METABOLIC PNL TOTAL CA: CPT | Performed by: FAMILY MEDICINE

## 2021-01-01 PROCEDURE — 84443 ASSAY THYROID STIM HORMONE: CPT | Performed by: NURSE PRACTITIONER

## 2021-01-01 PROCEDURE — 82550 ASSAY OF CK (CPK): CPT | Performed by: EMERGENCY MEDICINE

## 2021-01-01 PROCEDURE — 80053 COMPREHEN METABOLIC PANEL: CPT | Performed by: EMERGENCY MEDICINE

## 2021-01-01 PROCEDURE — 84100 ASSAY OF PHOSPHORUS: CPT | Performed by: PHYSICIAN ASSISTANT

## 2021-01-01 PROCEDURE — 99284 EMERGENCY DEPT VISIT MOD MDM: CPT

## 2021-01-01 PROCEDURE — 83036 HEMOGLOBIN GLYCOSYLATED A1C: CPT | Performed by: EMERGENCY MEDICINE

## 2021-01-01 PROCEDURE — 82140 ASSAY OF AMMONIA: CPT | Performed by: EMERGENCY MEDICINE

## 2021-01-01 PROCEDURE — C9803 HOPD COVID-19 SPEC COLLECT: HCPCS | Performed by: ORTHOPAEDIC SURGERY

## 2021-01-01 PROCEDURE — 82607 VITAMIN B-12: CPT | Performed by: FAMILY MEDICINE

## 2021-01-01 PROCEDURE — 25010000002 DROPERIDOL PER 5 MG: Performed by: EMERGENCY MEDICINE

## 2021-01-01 PROCEDURE — 80053 COMPREHEN METABOLIC PANEL: CPT | Performed by: NURSE PRACTITIONER

## 2021-01-01 PROCEDURE — 84484 ASSAY OF TROPONIN QUANT: CPT | Performed by: PHYSICIAN ASSISTANT

## 2021-01-01 PROCEDURE — 87040 BLOOD CULTURE FOR BACTERIA: CPT | Performed by: EMERGENCY MEDICINE

## 2021-01-01 PROCEDURE — 82009 KETONE BODYS QUAL: CPT | Performed by: EMERGENCY MEDICINE

## 2021-01-01 PROCEDURE — 94660 CPAP INITIATION&MGMT: CPT

## 2021-01-01 PROCEDURE — 94644 CONT INHLJ TX 1ST HOUR: CPT

## 2021-01-01 PROCEDURE — 85379 FIBRIN DEGRADATION QUANT: CPT | Performed by: EMERGENCY MEDICINE

## 2021-01-01 PROCEDURE — 25010000002 ONDANSETRON PER 1 MG: Performed by: FAMILY MEDICINE

## 2021-01-01 PROCEDURE — 84702 CHORIONIC GONADOTROPIN TEST: CPT | Performed by: INTERNAL MEDICINE

## 2021-01-01 PROCEDURE — 85730 THROMBOPLASTIN TIME PARTIAL: CPT | Performed by: PHYSICIAN ASSISTANT

## 2021-01-01 PROCEDURE — 36600 WITHDRAWAL OF ARTERIAL BLOOD: CPT

## 2021-01-01 PROCEDURE — 25010000002 HYDRALAZINE PER 20 MG: Performed by: PHYSICIAN ASSISTANT

## 2021-01-01 PROCEDURE — 97165 OT EVAL LOW COMPLEX 30 MIN: CPT | Performed by: OCCUPATIONAL THERAPIST

## 2021-01-01 PROCEDURE — 87040 BLOOD CULTURE FOR BACTERIA: CPT | Performed by: FAMILY MEDICINE

## 2021-01-01 PROCEDURE — 82728 ASSAY OF FERRITIN: CPT | Performed by: FAMILY MEDICINE

## 2021-01-01 PROCEDURE — 97116 GAIT TRAINING THERAPY: CPT

## 2021-01-01 PROCEDURE — 25010000002 LORAZEPAM PER 2 MG

## 2021-01-01 PROCEDURE — 25010000002 DROPERIDOL PER 5 MG: Performed by: INTERNAL MEDICINE

## 2021-01-01 PROCEDURE — 85610 PROTHROMBIN TIME: CPT | Performed by: PHYSICIAN ASSISTANT

## 2021-01-01 PROCEDURE — 82140 ASSAY OF AMMONIA: CPT | Performed by: PHYSICIAN ASSISTANT

## 2021-01-01 PROCEDURE — 99223 1ST HOSP IP/OBS HIGH 75: CPT | Performed by: CLINICAL NURSE SPECIALIST

## 2021-01-01 PROCEDURE — 83050 HGB METHEMOGLOBIN QUAN: CPT

## 2021-01-01 PROCEDURE — 25010000002 EPOETIN ALFA-EPBX 2000 UNIT/ML SOLUTION 1 ML VIAL: Performed by: FAMILY MEDICINE

## 2021-01-01 PROCEDURE — 25010000002 MORPHINE SULFATE (PF) 2 MG/ML SOLUTION: Performed by: EMERGENCY MEDICINE

## 2021-01-01 PROCEDURE — 87635 SARS-COV-2 COVID-19 AMP PRB: CPT | Performed by: PHYSICIAN ASSISTANT

## 2021-01-01 PROCEDURE — G0463 HOSPITAL OUTPT CLINIC VISIT: HCPCS | Performed by: ORTHOPAEDIC SURGERY

## 2021-01-01 PROCEDURE — 83930 ASSAY OF BLOOD OSMOLALITY: CPT | Performed by: FAMILY MEDICINE

## 2021-01-01 PROCEDURE — U0004 COV-19 TEST NON-CDC HGH THRU: HCPCS | Performed by: ORTHOPAEDIC SURGERY

## 2021-01-01 PROCEDURE — 25010000002 POTASSIUM CHLORIDE PER 2 MEQ: Performed by: PHYSICIAN ASSISTANT

## 2021-01-01 PROCEDURE — 85027 COMPLETE CBC AUTOMATED: CPT | Performed by: FAMILY MEDICINE

## 2021-01-01 PROCEDURE — 76856 US EXAM PELVIC COMPLETE: CPT

## 2021-01-01 PROCEDURE — 84100 ASSAY OF PHOSPHORUS: CPT | Performed by: INTERNAL MEDICINE

## 2021-01-01 PROCEDURE — 72192 CT PELVIS W/O DYE: CPT

## 2021-01-01 PROCEDURE — 25010000002 CALCIUM GLUCONATE PER 10 ML: Performed by: EMERGENCY MEDICINE

## 2021-01-01 PROCEDURE — 87086 URINE CULTURE/COLONY COUNT: CPT | Performed by: EMERGENCY MEDICINE

## 2021-01-01 PROCEDURE — 85025 COMPLETE CBC W/AUTO DIFF WBC: CPT | Performed by: PHYSICIAN ASSISTANT

## 2021-01-01 PROCEDURE — 85025 COMPLETE CBC W/AUTO DIFF WBC: CPT

## 2021-01-01 PROCEDURE — 93005 ELECTROCARDIOGRAM TRACING: CPT | Performed by: PHYSICIAN ASSISTANT

## 2021-01-01 PROCEDURE — 93005 ELECTROCARDIOGRAM TRACING: CPT | Performed by: INTERNAL MEDICINE

## 2021-01-01 PROCEDURE — 83036 HEMOGLOBIN GLYCOSYLATED A1C: CPT | Performed by: FAMILY MEDICINE

## 2021-01-01 PROCEDURE — 25010000002 LEVOFLOXACIN PER 250 MG: Performed by: EMERGENCY MEDICINE

## 2021-01-01 PROCEDURE — 72148 MRI LUMBAR SPINE W/O DYE: CPT

## 2021-01-01 PROCEDURE — 83036 HEMOGLOBIN GLYCOSYLATED A1C: CPT | Performed by: INTERNAL MEDICINE

## 2021-01-01 PROCEDURE — 25010000003 INSULIN REGULAR HUMAN PER 5 UNITS: Performed by: FAMILY MEDICINE

## 2021-01-01 PROCEDURE — 80048 BASIC METABOLIC PNL TOTAL CA: CPT

## 2021-01-01 PROCEDURE — 82746 ASSAY OF FOLIC ACID SERUM: CPT | Performed by: FAMILY MEDICINE

## 2021-01-01 PROCEDURE — 81001 URINALYSIS AUTO W/SCOPE: CPT | Performed by: PHYSICIAN ASSISTANT

## 2021-01-01 PROCEDURE — 80143 DRUG ASSAY ACETAMINOPHEN: CPT | Performed by: PHYSICIAN ASSISTANT

## 2021-01-01 PROCEDURE — 99223 1ST HOSP IP/OBS HIGH 75: CPT | Performed by: INTERNAL MEDICINE

## 2021-01-01 PROCEDURE — 92523 SPEECH SOUND LANG COMPREHEN: CPT | Performed by: SPEECH-LANGUAGE PATHOLOGIST

## 2021-01-01 PROCEDURE — 94664 DEMO&/EVAL PT USE INHALER: CPT

## 2021-01-01 PROCEDURE — 92526 ORAL FUNCTION THERAPY: CPT | Performed by: SPEECH-LANGUAGE PATHOLOGIST

## 2021-01-01 PROCEDURE — 84145 PROCALCITONIN (PCT): CPT | Performed by: EMERGENCY MEDICINE

## 2021-01-01 PROCEDURE — 83690 ASSAY OF LIPASE: CPT | Performed by: PHYSICIAN ASSISTANT

## 2021-01-01 PROCEDURE — 36415 COLL VENOUS BLD VENIPUNCTURE: CPT | Performed by: FAMILY MEDICINE

## 2021-01-01 PROCEDURE — 25010000002 ONDANSETRON PER 1 MG: Performed by: EMERGENCY MEDICINE

## 2021-01-01 PROCEDURE — 85025 COMPLETE CBC W/AUTO DIFF WBC: CPT | Performed by: NURSE PRACTITIONER

## 2021-01-01 PROCEDURE — 80306 DRUG TEST PRSMV INSTRMNT: CPT | Performed by: PHYSICIAN ASSISTANT

## 2021-01-01 PROCEDURE — 97166 OT EVAL MOD COMPLEX 45 MIN: CPT

## 2021-01-01 PROCEDURE — 25010000002 POTASSIUM CHLORIDE PER 2 MEQ: Performed by: INTERNAL MEDICINE

## 2021-01-01 PROCEDURE — 25010000002 SODIUM CHLORIDE 0.9 % WITH KCL 20 MEQ 20-0.9 MEQ/L-% SOLUTION: Performed by: FAMILY MEDICINE

## 2021-01-01 PROCEDURE — 83880 ASSAY OF NATRIURETIC PEPTIDE: CPT | Performed by: PHYSICIAN ASSISTANT

## 2021-01-01 PROCEDURE — 25010000002 EPOETIN ALFA-EPBX 10000 UNIT/ML SOLUTION: Performed by: INTERNAL MEDICINE

## 2021-01-01 PROCEDURE — 73562 X-RAY EXAM OF KNEE 3: CPT

## 2021-01-01 PROCEDURE — 97161 PT EVAL LOW COMPLEX 20 MIN: CPT | Performed by: PHYSICAL THERAPIST

## 2021-01-01 RX ORDER — HYDROCODONE BITARTRATE AND ACETAMINOPHEN 5; 325 MG/1; MG/1
1 TABLET ORAL EVERY 6 HOURS PRN
Status: DISCONTINUED | OUTPATIENT
Start: 2021-01-01 | End: 2021-01-01 | Stop reason: HOSPADM

## 2021-01-01 RX ORDER — FAMOTIDINE 10 MG/ML
20 INJECTION, SOLUTION INTRAVENOUS DAILY
Status: DISCONTINUED | OUTPATIENT
Start: 2021-01-01 | End: 2021-01-01 | Stop reason: HOSPADM

## 2021-01-01 RX ORDER — LORAZEPAM 2 MG/ML
INJECTION INTRAMUSCULAR
Status: COMPLETED
Start: 2021-01-01 | End: 2021-01-01

## 2021-01-01 RX ORDER — LORAZEPAM 2 MG/ML
1 INJECTION INTRAMUSCULAR ONCE AS NEEDED
Status: COMPLETED | OUTPATIENT
Start: 2021-01-01 | End: 2021-01-01

## 2021-01-01 RX ORDER — SODIUM CHLORIDE 0.9 % (FLUSH) 0.9 %
10 SYRINGE (ML) INJECTION AS NEEDED
Status: DISCONTINUED | OUTPATIENT
Start: 2021-01-01 | End: 2021-01-01 | Stop reason: HOSPADM

## 2021-01-01 RX ORDER — LEVOFLOXACIN 5 MG/ML
750 INJECTION, SOLUTION INTRAVENOUS ONCE
Status: COMPLETED | OUTPATIENT
Start: 2021-01-01 | End: 2021-01-01

## 2021-01-01 RX ORDER — CARVEDILOL 25 MG/1
25 TABLET ORAL 2 TIMES DAILY WITH MEALS
Status: DISCONTINUED | OUTPATIENT
Start: 2021-01-01 | End: 2021-01-01 | Stop reason: HOSPADM

## 2021-01-01 RX ORDER — DIPHENHYDRAMINE HCL 25 MG
25 CAPSULE ORAL EVERY 4 HOURS PRN
Status: CANCELLED | OUTPATIENT
Start: 2021-01-01

## 2021-01-01 RX ORDER — NIFEDIPINE 60 MG/1
60 TABLET, EXTENDED RELEASE ORAL
Status: DISCONTINUED | OUTPATIENT
Start: 2021-01-01 | End: 2021-01-01 | Stop reason: HOSPADM

## 2021-01-01 RX ORDER — LEVOTHYROXINE SODIUM 0.03 MG/1
25 TABLET ORAL DAILY
Status: ON HOLD | COMMUNITY
End: 2021-01-01 | Stop reason: SDUPTHER

## 2021-01-01 RX ORDER — CARVEDILOL 6.25 MG/1
6.25 TABLET ORAL 2 TIMES DAILY WITH MEALS
Status: DISCONTINUED | OUTPATIENT
Start: 2021-01-01 | End: 2021-01-01

## 2021-01-01 RX ORDER — SENNA PLUS 8.6 MG/1
1 TABLET ORAL NIGHTLY
Status: DISCONTINUED | OUTPATIENT
Start: 2021-01-01 | End: 2021-01-01 | Stop reason: HOSPADM

## 2021-01-01 RX ORDER — NIFEDIPINE 90 MG/1
90 TABLET, EXTENDED RELEASE ORAL
Status: DISCONTINUED | OUTPATIENT
Start: 2021-01-01 | End: 2021-01-01 | Stop reason: HOSPADM

## 2021-01-01 RX ORDER — METOPROLOL SUCCINATE 100 MG/1
100 TABLET, EXTENDED RELEASE ORAL 2 TIMES DAILY
COMMUNITY
End: 2021-01-01 | Stop reason: HOSPADM

## 2021-01-01 RX ORDER — ROPINIROLE 1 MG/1
4 TABLET, FILM COATED ORAL NIGHTLY
Status: DISCONTINUED | OUTPATIENT
Start: 2021-01-01 | End: 2021-01-01 | Stop reason: HOSPADM

## 2021-01-01 RX ORDER — ISOSORBIDE MONONITRATE 60 MG/1
120 TABLET, EXTENDED RELEASE ORAL DAILY
Status: DISCONTINUED | OUTPATIENT
Start: 2021-01-01 | End: 2021-01-01

## 2021-01-01 RX ORDER — CARVEDILOL 6.25 MG/1
12.5 TABLET ORAL 2 TIMES DAILY WITH MEALS
Status: DISCONTINUED | OUTPATIENT
Start: 2021-01-01 | End: 2021-01-01 | Stop reason: HOSPADM

## 2021-01-01 RX ORDER — LEVOTHYROXINE SODIUM 0.03 MG/1
25 TABLET ORAL
Status: DISCONTINUED | OUTPATIENT
Start: 2021-01-01 | End: 2021-01-01 | Stop reason: HOSPADM

## 2021-01-01 RX ORDER — DEXTROSE AND SODIUM CHLORIDE 5; .45 G/100ML; G/100ML
150 INJECTION, SOLUTION INTRAVENOUS CONTINUOUS PRN
Status: DISCONTINUED | OUTPATIENT
Start: 2021-01-01 | End: 2021-01-01

## 2021-01-01 RX ORDER — CLONIDINE HYDROCHLORIDE 0.2 MG/1
0.2 TABLET ORAL EVERY 8 HOURS SCHEDULED
Status: ON HOLD
Start: 2021-01-01 | End: 2021-01-01 | Stop reason: SDUPTHER

## 2021-01-01 RX ORDER — FAMOTIDINE 10 MG/ML
40 INJECTION, SOLUTION INTRAVENOUS ONCE
Status: COMPLETED | OUTPATIENT
Start: 2021-01-01 | End: 2021-01-01

## 2021-01-01 RX ORDER — LISINOPRIL 20 MG/1
20 TABLET ORAL 2 TIMES DAILY
Status: ON HOLD | COMMUNITY
End: 2021-01-01 | Stop reason: SDUPTHER

## 2021-01-01 RX ORDER — OXYCODONE HCL 10 MG/1
10 TABLET, FILM COATED, EXTENDED RELEASE ORAL EVERY 12 HOURS SCHEDULED
Qty: 6 TABLET | Refills: 0 | Status: SHIPPED | OUTPATIENT
Start: 2021-01-01 | End: 2021-01-01

## 2021-01-01 RX ORDER — DEXTROSE MONOHYDRATE 25 G/50ML
25 INJECTION, SOLUTION INTRAVENOUS
Status: DISCONTINUED | OUTPATIENT
Start: 2021-01-01 | End: 2021-01-01 | Stop reason: HOSPADM

## 2021-01-01 RX ORDER — ACETAMINOPHEN 650 MG/1
650 SUPPOSITORY RECTAL EVERY 4 HOURS PRN
Status: DISCONTINUED | OUTPATIENT
Start: 2021-01-01 | End: 2021-01-01 | Stop reason: HOSPADM

## 2021-01-01 RX ORDER — DEXTROSE, SODIUM CHLORIDE, AND POTASSIUM CHLORIDE 5; .45; .15 G/100ML; G/100ML; G/100ML
150 INJECTION INTRAVENOUS CONTINUOUS PRN
Status: DISCONTINUED | OUTPATIENT
Start: 2021-01-01 | End: 2021-01-01

## 2021-01-01 RX ORDER — ONDANSETRON 2 MG/ML
4 INJECTION INTRAMUSCULAR; INTRAVENOUS
Status: CANCELLED | OUTPATIENT
Start: 2021-01-01

## 2021-01-01 RX ORDER — FAMOTIDINE 20 MG/1
20 TABLET, FILM COATED ORAL DAILY
COMMUNITY

## 2021-01-01 RX ORDER — CARVEDILOL 6.25 MG/1
12.5 TABLET ORAL 2 TIMES DAILY WITH MEALS
Status: DISCONTINUED | OUTPATIENT
Start: 2021-01-01 | End: 2021-01-01

## 2021-01-01 RX ORDER — PREGABALIN 75 MG/1
75 CAPSULE ORAL EVERY 12 HOURS SCHEDULED
Status: DISCONTINUED | OUTPATIENT
Start: 2021-01-01 | End: 2021-01-01 | Stop reason: HOSPADM

## 2021-01-01 RX ORDER — ORPHENADRINE CITRATE 100 MG/1
100 TABLET, EXTENDED RELEASE ORAL EVERY 12 HOURS SCHEDULED
Qty: 6 TABLET | Refills: 0 | Status: ON HOLD | OUTPATIENT
Start: 2021-01-01 | End: 2021-01-01

## 2021-01-01 RX ORDER — CALCIUM GLUCONATE 94 MG/ML
1 INJECTION, SOLUTION INTRAVENOUS ONCE
Status: DISCONTINUED | OUTPATIENT
Start: 2021-01-01 | End: 2021-01-01 | Stop reason: SDUPTHER

## 2021-01-01 RX ORDER — SODIUM CHLORIDE AND POTASSIUM CHLORIDE 300; 900 MG/100ML; MG/100ML
250 INJECTION, SOLUTION INTRAVENOUS CONTINUOUS PRN
Status: DISCONTINUED | OUTPATIENT
Start: 2021-01-01 | End: 2021-01-01

## 2021-01-01 RX ORDER — ONDANSETRON 2 MG/ML
4 INJECTION INTRAMUSCULAR; INTRAVENOUS EVERY 6 HOURS PRN
Status: DISCONTINUED | OUTPATIENT
Start: 2021-01-01 | End: 2021-01-01 | Stop reason: HOSPADM

## 2021-01-01 RX ORDER — NICOTINE POLACRILEX 4 MG
15 LOZENGE BUCCAL
Status: DISCONTINUED | OUTPATIENT
Start: 2021-01-01 | End: 2021-01-01 | Stop reason: SDUPTHER

## 2021-01-01 RX ORDER — NICOTINE POLACRILEX 4 MG
15 LOZENGE BUCCAL
Status: DISCONTINUED | OUTPATIENT
Start: 2021-01-01 | End: 2021-01-01

## 2021-01-01 RX ORDER — SODIUM CHLORIDE 0.9 % (FLUSH) 0.9 %
10 SYRINGE (ML) INJECTION EVERY 12 HOURS SCHEDULED
Status: DISCONTINUED | OUTPATIENT
Start: 2021-01-01 | End: 2021-01-01 | Stop reason: HOSPADM

## 2021-01-01 RX ORDER — PREGABALIN 75 MG/1
75 CAPSULE ORAL 2 TIMES DAILY
Status: ON HOLD | COMMUNITY
End: 2021-01-01

## 2021-01-01 RX ORDER — LISINOPRIL 20 MG/1
20 TABLET ORAL EVERY 12 HOURS SCHEDULED
Status: DISCONTINUED | OUTPATIENT
Start: 2021-01-01 | End: 2021-01-01 | Stop reason: HOSPADM

## 2021-01-01 RX ORDER — SODIUM CHLORIDE AND POTASSIUM CHLORIDE 150; 450 MG/100ML; MG/100ML
250 INJECTION, SOLUTION INTRAVENOUS CONTINUOUS PRN
Status: DISCONTINUED | OUTPATIENT
Start: 2021-01-01 | End: 2021-01-01

## 2021-01-01 RX ORDER — OXYCODONE HYDROCHLORIDE 5 MG/1
10 TABLET ORAL EVERY 6 HOURS PRN
Status: DISCONTINUED | OUTPATIENT
Start: 2021-01-01 | End: 2021-01-01

## 2021-01-01 RX ORDER — ISOSORBIDE MONONITRATE 60 MG/1
60 TABLET, EXTENDED RELEASE ORAL
Status: DISCONTINUED | OUTPATIENT
Start: 2021-01-01 | End: 2021-01-01 | Stop reason: HOSPADM

## 2021-01-01 RX ORDER — LISINOPRIL 20 MG/1
20 TABLET ORAL 2 TIMES DAILY
Status: DISCONTINUED | OUTPATIENT
Start: 2021-01-01 | End: 2021-01-01 | Stop reason: HOSPADM

## 2021-01-01 RX ORDER — GABAPENTIN 300 MG/1
300 CAPSULE ORAL 3 TIMES DAILY
Status: DISCONTINUED | OUTPATIENT
Start: 2021-01-01 | End: 2021-01-01

## 2021-01-01 RX ORDER — SODIUM CHLORIDE AND POTASSIUM CHLORIDE 150; 900 MG/100ML; MG/100ML
250 INJECTION, SOLUTION INTRAVENOUS CONTINUOUS PRN
Status: DISCONTINUED | OUTPATIENT
Start: 2021-01-01 | End: 2021-01-01

## 2021-01-01 RX ORDER — SODIUM CHLORIDE, SODIUM LACTATE, POTASSIUM CHLORIDE, CALCIUM CHLORIDE 600; 310; 30; 20 MG/100ML; MG/100ML; MG/100ML; MG/100ML
1000 INJECTION, SOLUTION INTRAVENOUS CONTINUOUS
Status: DISCONTINUED | OUTPATIENT
Start: 2021-01-01 | End: 2021-01-01 | Stop reason: HOSPADM

## 2021-01-01 RX ORDER — NICOTINE POLACRILEX 4 MG
15 LOZENGE BUCCAL
Status: DISCONTINUED | OUTPATIENT
Start: 2021-01-01 | End: 2021-01-01 | Stop reason: HOSPADM

## 2021-01-01 RX ORDER — LACTULOSE 20 G/30ML
20 SOLUTION ORAL 3 TIMES DAILY
Status: DISCONTINUED | OUTPATIENT
Start: 2021-01-01 | End: 2021-01-01 | Stop reason: HOSPADM

## 2021-01-01 RX ORDER — DEXTROSE, SODIUM CHLORIDE, AND POTASSIUM CHLORIDE 5; .45; .3 G/100ML; G/100ML; G/100ML
150 INJECTION INTRAVENOUS CONTINUOUS PRN
Status: DISCONTINUED | OUTPATIENT
Start: 2021-01-01 | End: 2021-01-01

## 2021-01-01 RX ORDER — LEVOTHYROXINE SODIUM 0.05 MG/1
50 TABLET ORAL
Status: DISCONTINUED | OUTPATIENT
Start: 2021-01-01 | End: 2021-01-01 | Stop reason: HOSPADM

## 2021-01-01 RX ORDER — IPRATROPIUM BROMIDE AND ALBUTEROL SULFATE 2.5; .5 MG/3ML; MG/3ML
3 SOLUTION RESPIRATORY (INHALATION)
Status: DISCONTINUED | OUTPATIENT
Start: 2021-01-01 | End: 2021-01-01

## 2021-01-01 RX ORDER — SODIUM CHLORIDE 450 MG/100ML
250 INJECTION, SOLUTION INTRAVENOUS CONTINUOUS PRN
Status: DISCONTINUED | OUTPATIENT
Start: 2021-01-01 | End: 2021-01-01

## 2021-01-01 RX ORDER — SODIUM CHLORIDE 0.9 % (FLUSH) 0.9 %
3 SYRINGE (ML) INJECTION AS NEEDED
Status: DISCONTINUED | OUTPATIENT
Start: 2021-01-01 | End: 2021-01-01 | Stop reason: HOSPADM

## 2021-01-01 RX ORDER — LISINOPRIL 20 MG/1
20 TABLET ORAL 2 TIMES DAILY
Status: DISCONTINUED | OUTPATIENT
Start: 2021-01-01 | End: 2021-01-01

## 2021-01-01 RX ORDER — GABAPENTIN 300 MG/1
300 CAPSULE ORAL NIGHTLY
Status: DISCONTINUED | OUTPATIENT
Start: 2021-01-01 | End: 2021-01-01

## 2021-01-01 RX ORDER — NIFEDIPINE 30 MG/1
30 TABLET, EXTENDED RELEASE ORAL ONCE
Status: COMPLETED | OUTPATIENT
Start: 2021-01-01 | End: 2021-01-01

## 2021-01-01 RX ORDER — LEVOTHYROXINE SODIUM 0.05 MG/1
50 TABLET ORAL DAILY
Qty: 30 TABLET | Refills: 3 | Status: ON HOLD | OUTPATIENT
Start: 2021-01-01 | End: 2022-01-01

## 2021-01-01 RX ORDER — CLONIDINE HYDROCHLORIDE 0.1 MG/1
0.1 TABLET ORAL EVERY 8 HOURS PRN
Status: DISCONTINUED | OUTPATIENT
Start: 2021-01-01 | End: 2021-01-01

## 2021-01-01 RX ORDER — ATORVASTATIN CALCIUM 10 MG/1
10 TABLET, FILM COATED ORAL DAILY
Status: DISCONTINUED | OUTPATIENT
Start: 2021-01-01 | End: 2021-01-01 | Stop reason: HOSPADM

## 2021-01-01 RX ORDER — DIPHENHYDRAMINE HYDROCHLORIDE 50 MG/ML
25 INJECTION INTRAMUSCULAR; INTRAVENOUS EVERY 4 HOURS PRN
Status: CANCELLED | OUTPATIENT
Start: 2021-01-01

## 2021-01-01 RX ORDER — SODIUM CHLORIDE 9 MG/ML
250 INJECTION, SOLUTION INTRAVENOUS CONTINUOUS PRN
Status: DISCONTINUED | OUTPATIENT
Start: 2021-01-01 | End: 2021-01-01

## 2021-01-01 RX ORDER — HYDRALAZINE HYDROCHLORIDE 20 MG/ML
10 INJECTION INTRAMUSCULAR; INTRAVENOUS EVERY 8 HOURS
Status: DISCONTINUED | OUTPATIENT
Start: 2021-01-01 | End: 2021-01-01

## 2021-01-01 RX ORDER — ORPHENADRINE CITRATE 100 MG/1
100 TABLET, EXTENDED RELEASE ORAL EVERY 12 HOURS SCHEDULED
Status: DISCONTINUED | OUTPATIENT
Start: 2021-01-01 | End: 2021-01-01 | Stop reason: HOSPADM

## 2021-01-01 RX ORDER — HYDRALAZINE HYDROCHLORIDE 50 MG/1
50 TABLET, FILM COATED ORAL 3 TIMES DAILY
Status: DISCONTINUED | OUTPATIENT
Start: 2021-01-01 | End: 2021-01-01 | Stop reason: HOSPADM

## 2021-01-01 RX ORDER — MORPHINE SULFATE 2 MG/ML
2 INJECTION, SOLUTION INTRAMUSCULAR; INTRAVENOUS ONCE
Status: COMPLETED | OUTPATIENT
Start: 2021-01-01 | End: 2021-01-01

## 2021-01-01 RX ORDER — LACTULOSE 10 G/15ML
20 SOLUTION ORAL 3 TIMES DAILY
COMMUNITY
End: 2021-01-01 | Stop reason: HOSPADM

## 2021-01-01 RX ORDER — PREGABALIN 75 MG/1
75 CAPSULE ORAL NIGHTLY
Status: DISCONTINUED | OUTPATIENT
Start: 2021-01-01 | End: 2021-01-01

## 2021-01-01 RX ORDER — FAMOTIDINE 10 MG/ML
20 INJECTION, SOLUTION INTRAVENOUS DAILY
Status: DISCONTINUED | OUTPATIENT
Start: 2021-01-01 | End: 2021-01-01

## 2021-01-01 RX ORDER — GABAPENTIN 300 MG/1
300 CAPSULE ORAL NIGHTLY
Status: DISCONTINUED | OUTPATIENT
Start: 2021-01-01 | End: 2021-01-01 | Stop reason: HOSPADM

## 2021-01-01 RX ORDER — SPIRONOLACTONE 25 MG/1
25 TABLET ORAL DAILY
Status: DISCONTINUED | OUTPATIENT
Start: 2021-01-01 | End: 2021-01-01 | Stop reason: HOSPADM

## 2021-01-01 RX ORDER — DEXTROSE MONOHYDRATE 25 G/50ML
25 INJECTION, SOLUTION INTRAVENOUS ONCE
Status: COMPLETED | OUTPATIENT
Start: 2021-01-01 | End: 2021-01-01

## 2021-01-01 RX ORDER — ALBUMIN (HUMAN) 12.5 G/50ML
12.5 SOLUTION INTRAVENOUS AS NEEDED
Status: CANCELLED | OUTPATIENT
Start: 2021-01-01 | End: 2021-01-01

## 2021-01-01 RX ORDER — LACTULOSE 20 G/30ML
20 SOLUTION ORAL 3 TIMES DAILY
Status: DISCONTINUED | OUTPATIENT
Start: 2021-01-01 | End: 2021-01-01

## 2021-01-01 RX ORDER — NICOTINE 21 MG/24HR
1 PATCH, TRANSDERMAL 24 HOURS TRANSDERMAL
Status: DISCONTINUED | OUTPATIENT
Start: 2021-01-01 | End: 2021-01-01 | Stop reason: HOSPADM

## 2021-01-01 RX ORDER — CLONIDINE HYDROCHLORIDE 0.2 MG/1
0.2 TABLET ORAL EVERY 8 HOURS SCHEDULED
Status: DISCONTINUED | OUTPATIENT
Start: 2021-01-01 | End: 2021-01-01 | Stop reason: HOSPADM

## 2021-01-01 RX ORDER — OXYCODONE HYDROCHLORIDE 5 MG/1
5 TABLET ORAL ONCE AS NEEDED
Status: COMPLETED | OUTPATIENT
Start: 2021-01-01 | End: 2021-01-01

## 2021-01-01 RX ORDER — DULOXETIN HYDROCHLORIDE 30 MG/1
60 CAPSULE, DELAYED RELEASE ORAL DAILY
Status: DISCONTINUED | OUTPATIENT
Start: 2021-01-01 | End: 2021-01-01 | Stop reason: HOSPADM

## 2021-01-01 RX ORDER — ONDANSETRON 2 MG/ML
INJECTION INTRAMUSCULAR; INTRAVENOUS
Status: COMPLETED
Start: 2021-01-01 | End: 2021-01-01

## 2021-01-01 RX ORDER — IPRATROPIUM BROMIDE AND ALBUTEROL SULFATE 2.5; .5 MG/3ML; MG/3ML
3 SOLUTION RESPIRATORY (INHALATION) EVERY 6 HOURS PRN
Status: DISCONTINUED | OUTPATIENT
Start: 2021-01-01 | End: 2021-01-01 | Stop reason: HOSPADM

## 2021-01-01 RX ORDER — DEXTROSE MONOHYDRATE 50 MG/ML
50 INJECTION, SOLUTION INTRAVENOUS CONTINUOUS
Status: DISPENSED | OUTPATIENT
Start: 2021-01-01 | End: 2021-01-01

## 2021-01-01 RX ORDER — POTASSIUM CHLORIDE 14.9 MG/ML
20 INJECTION INTRAVENOUS ONCE
Status: COMPLETED | OUTPATIENT
Start: 2021-01-01 | End: 2021-01-01

## 2021-01-01 RX ORDER — HYDRALAZINE HYDROCHLORIDE 50 MG/1
50 TABLET, FILM COATED ORAL 3 TIMES DAILY
Status: DISCONTINUED | OUTPATIENT
Start: 2021-01-01 | End: 2021-01-01

## 2021-01-01 RX ORDER — NALOXONE HCL 0.4 MG/ML
0.4 VIAL (ML) INJECTION ONCE
Status: COMPLETED | OUTPATIENT
Start: 2021-01-01 | End: 2021-01-01

## 2021-01-01 RX ORDER — DEXTROSE MONOHYDRATE 25 G/50ML
12.5 INJECTION, SOLUTION INTRAVENOUS AS NEEDED
Status: DISCONTINUED | OUTPATIENT
Start: 2021-01-01 | End: 2021-01-01

## 2021-01-01 RX ORDER — OXYCODONE HCL 20 MG/1
20 TABLET, FILM COATED, EXTENDED RELEASE ORAL ONCE
Status: DISCONTINUED | OUTPATIENT
Start: 2021-01-01 | End: 2021-01-01 | Stop reason: HOSPADM

## 2021-01-01 RX ORDER — OXYCODONE HCL 10 MG/1
10 TABLET, FILM COATED, EXTENDED RELEASE ORAL EVERY 12 HOURS SCHEDULED
Status: DISCONTINUED | OUTPATIENT
Start: 2021-01-01 | End: 2021-01-01 | Stop reason: HOSPADM

## 2021-01-01 RX ORDER — ATORVASTATIN CALCIUM 10 MG/1
20 TABLET, FILM COATED ORAL DAILY
Status: DISCONTINUED | OUTPATIENT
Start: 2021-01-01 | End: 2021-01-01 | Stop reason: HOSPADM

## 2021-01-01 RX ORDER — DROPERIDOL 2.5 MG/ML
2.5 INJECTION, SOLUTION INTRAMUSCULAR; INTRAVENOUS ONCE
Status: COMPLETED | OUTPATIENT
Start: 2021-01-01 | End: 2021-01-01

## 2021-01-01 RX ORDER — ALBUTEROL SULFATE 2.5 MG/3ML
10 SOLUTION RESPIRATORY (INHALATION)
Status: COMPLETED | OUTPATIENT
Start: 2021-01-01 | End: 2021-01-01

## 2021-01-01 RX ORDER — METOPROLOL SUCCINATE 100 MG/1
100 TABLET, EXTENDED RELEASE ORAL 2 TIMES DAILY
Status: ON HOLD | COMMUNITY
End: 2021-01-01

## 2021-01-01 RX ORDER — ONDANSETRON 2 MG/ML
4 INJECTION INTRAMUSCULAR; INTRAVENOUS ONCE
Status: COMPLETED | OUTPATIENT
Start: 2021-01-01 | End: 2021-01-01

## 2021-01-01 RX ORDER — LEVOTHYROXINE SODIUM 0.05 MG/1
50 TABLET ORAL
Status: DISCONTINUED | OUTPATIENT
Start: 2021-01-01 | End: 2021-01-01

## 2021-01-01 RX ORDER — ORPHENADRINE CITRATE 30 MG/ML
60 INJECTION INTRAMUSCULAR; INTRAVENOUS EVERY 12 HOURS
Status: DISCONTINUED | OUTPATIENT
Start: 2021-01-01 | End: 2021-01-01

## 2021-01-01 RX ORDER — GABAPENTIN 300 MG/1
300 CAPSULE ORAL 3 TIMES DAILY
COMMUNITY
End: 2021-01-01 | Stop reason: HOSPADM

## 2021-01-01 RX ORDER — HYDROXYZINE HYDROCHLORIDE 25 MG/1
25 TABLET, FILM COATED ORAL ONCE
Status: COMPLETED | OUTPATIENT
Start: 2021-01-01 | End: 2021-01-01

## 2021-01-01 RX ORDER — INSULIN ASPART 100 [IU]/ML
10 INJECTION, SOLUTION INTRAVENOUS; SUBCUTANEOUS
Status: ON HOLD | COMMUNITY
End: 2021-01-01

## 2021-01-01 RX ORDER — POTASSIUM CHLORIDE, DEXTROSE MONOHYDRATE AND SODIUM CHLORIDE 300; 5; 900 MG/100ML; G/100ML; MG/100ML
150 INJECTION, SOLUTION INTRAVENOUS CONTINUOUS PRN
Status: DISCONTINUED | OUTPATIENT
Start: 2021-01-01 | End: 2021-01-01

## 2021-01-01 RX ORDER — LABETALOL HYDROCHLORIDE 5 MG/ML
20 INJECTION, SOLUTION INTRAVENOUS EVERY 6 HOURS PRN
Status: DISCONTINUED | OUTPATIENT
Start: 2021-01-01 | End: 2021-01-01 | Stop reason: HOSPADM

## 2021-01-01 RX ORDER — ISOSORBIDE MONONITRATE 60 MG/1
60 TABLET, EXTENDED RELEASE ORAL
Status: ON HOLD
Start: 2021-01-01 | End: 2022-01-01

## 2021-01-01 RX ORDER — LIDOCAINE 50 MG/G
1 PATCH TOPICAL
Status: DISCONTINUED | OUTPATIENT
Start: 2021-01-01 | End: 2021-01-01 | Stop reason: HOSPADM

## 2021-01-01 RX ORDER — SODIUM CHLORIDE 9 MG/ML
10 INJECTION, SOLUTION INTRAVENOUS CONTINUOUS PRN
Status: DISCONTINUED | OUTPATIENT
Start: 2021-01-01 | End: 2021-01-01 | Stop reason: HOSPADM

## 2021-01-01 RX ORDER — CLONIDINE 0.3 MG/24H
1 PATCH, EXTENDED RELEASE TRANSDERMAL WEEKLY
Status: ON HOLD | COMMUNITY
End: 2021-01-01

## 2021-01-01 RX ORDER — LISINOPRIL 20 MG/1
20 TABLET ORAL 2 TIMES DAILY
Status: ON HOLD | COMMUNITY
End: 2022-01-01

## 2021-01-01 RX ORDER — NICOTINE 21 MG/24HR
1 PATCH, TRANSDERMAL 24 HOURS TRANSDERMAL
Qty: 30 PATCH | Refills: 2 | Status: ON HOLD | OUTPATIENT
Start: 2021-01-01 | End: 2021-01-01

## 2021-01-01 RX ORDER — ROPINIROLE 1 MG/1
2 TABLET, FILM COATED ORAL NIGHTLY
Status: DISCONTINUED | OUTPATIENT
Start: 2021-01-01 | End: 2021-01-01 | Stop reason: HOSPADM

## 2021-01-01 RX ORDER — DEXTROSE MONOHYDRATE 100 MG/ML
100 INJECTION, SOLUTION INTRAVENOUS CONTINUOUS
Status: DISCONTINUED | OUTPATIENT
Start: 2021-01-01 | End: 2021-01-01

## 2021-01-01 RX ORDER — NIFEDIPINE 60 MG/1
60 TABLET, EXTENDED RELEASE ORAL 2 TIMES DAILY
Status: ON HOLD | COMMUNITY
End: 2022-01-01

## 2021-01-01 RX ORDER — IPRATROPIUM BROMIDE AND ALBUTEROL SULFATE 2.5; .5 MG/3ML; MG/3ML
3 SOLUTION RESPIRATORY (INHALATION) EVERY 6 HOURS PRN
Status: DISCONTINUED | OUTPATIENT
Start: 2021-01-01 | End: 2021-01-01 | Stop reason: SDUPTHER

## 2021-01-01 RX ORDER — FAMOTIDINE 20 MG/1
20 TABLET, FILM COATED ORAL DAILY
Status: DISCONTINUED | OUTPATIENT
Start: 2021-01-01 | End: 2021-01-01 | Stop reason: HOSPADM

## 2021-01-01 RX ORDER — ROPINIROLE 1 MG/1
4 TABLET, FILM COATED ORAL ONCE
Status: COMPLETED | OUTPATIENT
Start: 2021-01-01 | End: 2021-01-01

## 2021-01-01 RX ORDER — HYDRALAZINE HYDROCHLORIDE 20 MG/ML
40 INJECTION INTRAMUSCULAR; INTRAVENOUS ONCE
Status: COMPLETED | OUTPATIENT
Start: 2021-01-01 | End: 2021-01-01

## 2021-01-01 RX ORDER — CLONIDINE HYDROCHLORIDE 0.1 MG/1
0.1 TABLET ORAL ONCE
Status: COMPLETED | OUTPATIENT
Start: 2021-01-01 | End: 2021-01-01

## 2021-01-01 RX ORDER — ACETAMINOPHEN 325 MG/1
650 TABLET ORAL EVERY 6 HOURS PRN
Status: DISCONTINUED | OUTPATIENT
Start: 2021-01-01 | End: 2021-01-01 | Stop reason: HOSPADM

## 2021-01-01 RX ORDER — ONDANSETRON 2 MG/ML
4 INJECTION INTRAMUSCULAR; INTRAVENOUS ONCE
Status: DISCONTINUED | OUTPATIENT
Start: 2021-01-01 | End: 2021-01-01

## 2021-01-01 RX ORDER — LIDOCAINE HYDROCHLORIDE 10 MG/ML
0.5 INJECTION, SOLUTION EPIDURAL; INFILTRATION; INTRACAUDAL; PERINEURAL ONCE AS NEEDED
Status: DISCONTINUED | OUTPATIENT
Start: 2021-01-01 | End: 2021-01-01 | Stop reason: HOSPADM

## 2021-01-01 RX ORDER — DEXTROSE MONOHYDRATE 25 G/50ML
25 INJECTION, SOLUTION INTRAVENOUS
Status: DISCONTINUED | OUTPATIENT
Start: 2021-01-01 | End: 2021-01-01

## 2021-01-01 RX ORDER — ISOSORBIDE MONONITRATE 120 MG/1
120 TABLET, EXTENDED RELEASE ORAL DAILY
COMMUNITY
End: 2021-01-01 | Stop reason: HOSPADM

## 2021-01-01 RX ORDER — HYDRALAZINE HYDROCHLORIDE 50 MG/1
100 TABLET, FILM COATED ORAL 3 TIMES DAILY
Status: DISCONTINUED | OUTPATIENT
Start: 2021-01-01 | End: 2021-01-01 | Stop reason: HOSPADM

## 2021-01-01 RX ORDER — CLONIDINE HYDROCHLORIDE 0.1 MG/1
0.2 TABLET ORAL EVERY 8 HOURS PRN
Status: DISCONTINUED | OUTPATIENT
Start: 2021-01-01 | End: 2021-01-01 | Stop reason: HOSPADM

## 2021-01-01 RX ORDER — ALBUTEROL SULFATE 2.5 MG/3ML
2.5 SOLUTION RESPIRATORY (INHALATION) EVERY 4 HOURS PRN
Status: DISCONTINUED | OUTPATIENT
Start: 2021-01-01 | End: 2021-01-01 | Stop reason: HOSPADM

## 2021-01-01 RX ORDER — PREGABALIN 75 MG/1
75 CAPSULE ORAL DAILY
Status: DISCONTINUED | OUTPATIENT
Start: 2021-01-01 | End: 2021-01-01 | Stop reason: HOSPADM

## 2021-01-01 RX ORDER — HYDRALAZINE HYDROCHLORIDE 20 MG/ML
20 INJECTION INTRAMUSCULAR; INTRAVENOUS ONCE
Status: COMPLETED | OUTPATIENT
Start: 2021-01-01 | End: 2021-01-01

## 2021-01-01 RX ORDER — LISINOPRIL 10 MG/1
10 TABLET ORAL
Status: DISCONTINUED | OUTPATIENT
Start: 2021-01-01 | End: 2021-01-01 | Stop reason: HOSPADM

## 2021-01-01 RX ORDER — OXYCODONE HYDROCHLORIDE 5 MG/1
5 TABLET ORAL EVERY 4 HOURS PRN
Status: DISCONTINUED | OUTPATIENT
Start: 2021-01-01 | End: 2021-01-01

## 2021-01-01 RX ORDER — DEXTROSE, SODIUM CHLORIDE, AND POTASSIUM CHLORIDE 5; .9; .15 G/100ML; G/100ML; G/100ML
150 INJECTION INTRAVENOUS CONTINUOUS PRN
Status: DISCONTINUED | OUTPATIENT
Start: 2021-01-01 | End: 2021-01-01

## 2021-01-01 RX ORDER — NITROGLYCERIN 20 MG/100ML
100 INJECTION INTRAVENOUS
Status: DISCONTINUED | OUTPATIENT
Start: 2021-01-01 | End: 2021-01-01 | Stop reason: HOSPADM

## 2021-01-01 RX ORDER — CLONIDINE HYDROCHLORIDE 0.1 MG/1
0.1 TABLET ORAL EVERY 8 HOURS SCHEDULED
Status: DISCONTINUED | OUTPATIENT
Start: 2021-01-01 | End: 2021-01-01

## 2021-01-01 RX ORDER — ATENOLOL 50 MG/1
50 TABLET ORAL
Status: DISCONTINUED | OUTPATIENT
Start: 2021-01-01 | End: 2021-01-01

## 2021-01-01 RX ORDER — HYDROMORPHONE HYDROCHLORIDE 1 MG/ML
0.5 INJECTION, SOLUTION INTRAMUSCULAR; INTRAVENOUS; SUBCUTANEOUS ONCE
Status: COMPLETED | OUTPATIENT
Start: 2021-01-01 | End: 2021-01-01

## 2021-01-01 RX ORDER — HYDRALAZINE HYDROCHLORIDE 100 MG/1
100 TABLET, FILM COATED ORAL 3 TIMES DAILY
Status: ON HOLD
Start: 2021-01-01 | End: 2021-01-01

## 2021-01-01 RX ORDER — GABAPENTIN 300 MG/1
300 CAPSULE ORAL NIGHTLY
Qty: 3 CAPSULE | Refills: 0 | Status: ON HOLD | OUTPATIENT
Start: 2021-01-01 | End: 2021-01-01

## 2021-01-01 RX ORDER — DROPERIDOL 2.5 MG/ML
1.25 INJECTION, SOLUTION INTRAMUSCULAR; INTRAVENOUS ONCE
Status: COMPLETED | OUTPATIENT
Start: 2021-01-01 | End: 2021-01-01

## 2021-01-01 RX ORDER — HYDRALAZINE HYDROCHLORIDE 50 MG/1
50 TABLET, FILM COATED ORAL EVERY 8 HOURS SCHEDULED
Status: DISCONTINUED | OUTPATIENT
Start: 2021-01-01 | End: 2021-01-01

## 2021-01-01 RX ORDER — LISINOPRIL 20 MG/1
40 TABLET ORAL
Status: DISCONTINUED | OUTPATIENT
Start: 2021-01-01 | End: 2021-01-01 | Stop reason: HOSPADM

## 2021-01-01 RX ORDER — CLONIDINE HYDROCHLORIDE 0.1 MG/1
0.1 TABLET ORAL 2 TIMES DAILY PRN
Status: DISCONTINUED | OUTPATIENT
Start: 2021-01-01 | End: 2021-01-01

## 2021-01-01 RX ORDER — HYDROCODONE BITARTRATE AND ACETAMINOPHEN 5; 325 MG/1; MG/1
1 TABLET ORAL EVERY 6 HOURS PRN
COMMUNITY
End: 2021-01-01 | Stop reason: HOSPADM

## 2021-01-01 RX ORDER — NIFEDIPINE 30 MG/1
30 TABLET, EXTENDED RELEASE ORAL
Status: DISCONTINUED | OUTPATIENT
Start: 2021-01-01 | End: 2021-01-01

## 2021-01-01 RX ORDER — FAMOTIDINE 20 MG/1
20 TABLET, FILM COATED ORAL 2 TIMES DAILY
Status: DISCONTINUED | OUTPATIENT
Start: 2021-01-01 | End: 2021-01-01

## 2021-01-01 RX ORDER — ACETAMINOPHEN 325 MG/1
650 TABLET ORAL EVERY 4 HOURS PRN
Status: DISCONTINUED | OUTPATIENT
Start: 2021-01-01 | End: 2021-01-01 | Stop reason: HOSPADM

## 2021-01-01 RX ORDER — INSULIN GLARGINE 100 [IU]/ML
70 INJECTION, SOLUTION SUBCUTANEOUS NIGHTLY
COMMUNITY
End: 2022-01-01 | Stop reason: HOSPADM

## 2021-01-01 RX ORDER — NIFEDIPINE 60 MG/1
60 TABLET, EXTENDED RELEASE ORAL 2 TIMES DAILY
COMMUNITY
End: 2021-01-01 | Stop reason: HOSPADM

## 2021-01-01 RX ORDER — FAMOTIDINE 20 MG/1
20 TABLET, FILM COATED ORAL DAILY
Status: DISCONTINUED | OUTPATIENT
Start: 2021-01-01 | End: 2021-01-01 | Stop reason: SDUPTHER

## 2021-01-01 RX ORDER — DEXTROSE MONOHYDRATE 25 G/50ML
25 INJECTION, SOLUTION INTRAVENOUS
Status: DISCONTINUED | OUTPATIENT
Start: 2021-01-01 | End: 2021-01-01 | Stop reason: SDUPTHER

## 2021-01-01 RX ORDER — LIDOCAINE 50 MG/G
1 PATCH TOPICAL
Qty: 3 PATCH | Refills: 0 | Status: SHIPPED | OUTPATIENT
Start: 2021-01-01 | End: 2021-01-01

## 2021-01-01 RX ORDER — PROCHLORPERAZINE EDISYLATE 5 MG/ML
10 INJECTION INTRAMUSCULAR; INTRAVENOUS ONCE
Status: COMPLETED | OUTPATIENT
Start: 2021-01-01 | End: 2021-01-01

## 2021-01-01 RX ORDER — NIFEDIPINE 60 MG/1
60 TABLET, EXTENDED RELEASE ORAL
Status: DISCONTINUED | OUTPATIENT
Start: 2021-01-01 | End: 2021-01-01

## 2021-01-01 RX ORDER — HYDRALAZINE HYDROCHLORIDE 50 MG/1
75 TABLET, FILM COATED ORAL 3 TIMES DAILY
Qty: 120 TABLET | Refills: 3 | Status: ON HOLD | OUTPATIENT
Start: 2021-01-01 | End: 2022-01-01

## 2021-01-01 RX ORDER — CLONIDINE 0.3 MG/24H
1 PATCH, EXTENDED RELEASE TRANSDERMAL WEEKLY
COMMUNITY
End: 2021-01-01 | Stop reason: HOSPADM

## 2021-01-01 RX ORDER — SODIUM CHLORIDE, SODIUM LACTATE, POTASSIUM CHLORIDE, CALCIUM CHLORIDE 600; 310; 30; 20 MG/100ML; MG/100ML; MG/100ML; MG/100ML
100 INJECTION, SOLUTION INTRAVENOUS CONTINUOUS PRN
Status: DISCONTINUED | OUTPATIENT
Start: 2021-01-01 | End: 2021-01-01 | Stop reason: HOSPADM

## 2021-01-01 RX ORDER — DEXTROSE MONOHYDRATE 25 G/50ML
25-50 INJECTION, SOLUTION INTRAVENOUS
Status: DISCONTINUED | OUTPATIENT
Start: 2021-01-01 | End: 2021-01-01

## 2021-01-01 RX ORDER — CARVEDILOL 25 MG/1
25 TABLET ORAL 2 TIMES DAILY WITH MEALS
Qty: 60 TABLET | Refills: 3 | Status: ON HOLD | OUTPATIENT
Start: 2021-01-01 | End: 2022-01-01

## 2021-01-01 RX ORDER — FAMOTIDINE 20 MG/1
20 TABLET, FILM COATED ORAL DAILY
Qty: 90 TABLET | Refills: 0 | Status: ON HOLD | OUTPATIENT
Start: 2021-01-01 | End: 2021-01-01

## 2021-01-01 RX ORDER — ACETAMINOPHEN 325 MG/1
650 TABLET ORAL EVERY 6 HOURS PRN
Start: 2021-01-01 | End: 2022-01-01 | Stop reason: HOSPADM

## 2021-01-01 RX ORDER — ROPINIROLE 1 MG/1
4 TABLET, FILM COATED ORAL NIGHTLY
Status: DISCONTINUED | OUTPATIENT
Start: 2021-01-01 | End: 2021-01-01

## 2021-01-01 RX ORDER — CARVEDILOL 6.25 MG/1
6.25 TABLET ORAL ONCE
Status: COMPLETED | OUTPATIENT
Start: 2021-01-01 | End: 2021-01-01

## 2021-01-01 RX ORDER — METOPROLOL TARTRATE 5 MG/5ML
2.5 INJECTION INTRAVENOUS EVERY 6 HOURS SCHEDULED
Status: DISCONTINUED | OUTPATIENT
Start: 2021-01-01 | End: 2021-01-01

## 2021-01-01 RX ORDER — DEXTROSE MONOHYDRATE 25 G/50ML
12.5 INJECTION, SOLUTION INTRAVENOUS AS NEEDED
Status: DISCONTINUED | OUTPATIENT
Start: 2021-01-01 | End: 2021-01-01 | Stop reason: HOSPADM

## 2021-01-01 RX ORDER — NITROGLYCERIN 20 MG/100ML
INJECTION INTRAVENOUS
Status: COMPLETED
Start: 2021-01-01 | End: 2021-01-01

## 2021-01-01 RX ORDER — LISINOPRIL 20 MG/1
20 TABLET ORAL
Status: DISCONTINUED | OUTPATIENT
Start: 2021-01-01 | End: 2021-01-01

## 2021-01-01 RX ORDER — LACTULOSE 10 G/15ML
20 SOLUTION ORAL 2 TIMES DAILY
Status: DISCONTINUED | OUTPATIENT
Start: 2021-01-01 | End: 2021-01-01 | Stop reason: HOSPADM

## 2021-01-01 RX ORDER — SODIUM CHLORIDE 0.9 % (FLUSH) 0.9 %
3 SYRINGE (ML) INJECTION EVERY 12 HOURS SCHEDULED
Status: DISCONTINUED | OUTPATIENT
Start: 2021-01-01 | End: 2021-01-01 | Stop reason: HOSPADM

## 2021-01-01 RX ORDER — ASPIRIN 81 MG/1
324 TABLET, CHEWABLE ORAL ONCE
Status: DISCONTINUED | OUTPATIENT
Start: 2021-01-01 | End: 2021-01-01

## 2021-01-01 RX ORDER — METOPROLOL TARTRATE 5 MG/5ML
5 INJECTION INTRAVENOUS EVERY 6 HOURS
Status: DISCONTINUED | OUTPATIENT
Start: 2021-01-01 | End: 2021-01-01

## 2021-01-01 RX ORDER — NICOTINE 21 MG/24HR
1 PATCH, TRANSDERMAL 24 HOURS TRANSDERMAL EVERY 24 HOURS
Status: ON HOLD | COMMUNITY
End: 2022-01-01

## 2021-01-01 RX ORDER — NITROGLYCERIN 20 MG/100ML
50 INJECTION INTRAVENOUS
Status: DISCONTINUED | OUTPATIENT
Start: 2021-01-01 | End: 2021-01-01

## 2021-01-01 RX ORDER — SODIUM CHLORIDE 0.9 % (FLUSH) 0.9 %
10 SYRINGE (ML) INJECTION ONCE AS NEEDED
Status: DISCONTINUED | OUTPATIENT
Start: 2021-01-01 | End: 2021-01-01 | Stop reason: HOSPADM

## 2021-01-01 RX ORDER — HYDRALAZINE HYDROCHLORIDE 50 MG/1
50 TABLET, FILM COATED ORAL 3 TIMES DAILY
Status: ON HOLD | COMMUNITY
End: 2021-01-01 | Stop reason: SDUPTHER

## 2021-01-01 RX ORDER — SPIRONOLACTONE 25 MG/1
25 TABLET ORAL DAILY
Status: ON HOLD
Start: 2021-01-01 | End: 2022-01-01

## 2021-01-01 RX ORDER — CARVEDILOL 12.5 MG/1
12.5 TABLET ORAL 2 TIMES DAILY WITH MEALS
Status: ON HOLD
Start: 2021-01-01 | End: 2021-01-01 | Stop reason: SDUPTHER

## 2021-01-01 RX ORDER — LEVOTHYROXINE SODIUM 0.03 MG/1
25 TABLET ORAL
Status: DISCONTINUED | OUTPATIENT
Start: 2021-01-01 | End: 2021-01-01

## 2021-01-01 RX ORDER — ACETAMINOPHEN 500 MG
1000 TABLET ORAL EVERY 4 HOURS PRN
Status: CANCELLED | OUTPATIENT
Start: 2021-01-01

## 2021-01-01 RX ORDER — CLONIDINE HYDROCHLORIDE 0.2 MG/1
0.2 TABLET ORAL EVERY 8 HOURS SCHEDULED
Status: DISCONTINUED | OUTPATIENT
Start: 2021-01-01 | End: 2021-01-01

## 2021-01-01 RX ORDER — IPRATROPIUM BROMIDE AND ALBUTEROL SULFATE 2.5; .5 MG/3ML; MG/3ML
3 SOLUTION RESPIRATORY (INHALATION)
Status: DISCONTINUED | OUTPATIENT
Start: 2021-01-01 | End: 2021-01-01 | Stop reason: HOSPADM

## 2021-01-01 RX ORDER — DEXTROSE AND SODIUM CHLORIDE 5; .9 G/100ML; G/100ML
150 INJECTION, SOLUTION INTRAVENOUS CONTINUOUS PRN
Status: DISCONTINUED | OUTPATIENT
Start: 2021-01-01 | End: 2021-01-01

## 2021-01-01 RX ORDER — NIFEDIPINE 60 MG/1
60 TABLET, EXTENDED RELEASE ORAL 2 TIMES DAILY
Status: DISCONTINUED | OUTPATIENT
Start: 2021-01-01 | End: 2021-01-01 | Stop reason: HOSPADM

## 2021-01-01 RX ORDER — ONDANSETRON 2 MG/ML
4 INJECTION INTRAMUSCULAR; INTRAVENOUS EVERY 6 HOURS PRN
Status: DISCONTINUED | OUTPATIENT
Start: 2021-01-01 | End: 2021-01-01

## 2021-01-01 RX ORDER — ALBUTEROL SULFATE 2.5 MG/3ML
2.5 SOLUTION RESPIRATORY (INHALATION) EVERY 6 HOURS PRN
Status: DISCONTINUED | OUTPATIENT
Start: 2021-01-01 | End: 2021-01-01 | Stop reason: HOSPADM

## 2021-01-01 RX ORDER — HYDRALAZINE HYDROCHLORIDE 25 MG/1
25 TABLET, FILM COATED ORAL ONCE
Status: COMPLETED | OUTPATIENT
Start: 2021-01-01 | End: 2021-01-01

## 2021-01-01 RX ORDER — CLONIDINE HYDROCHLORIDE 0.2 MG/1
0.2 TABLET ORAL EVERY 12 HOURS SCHEDULED
Status: DISCONTINUED | OUTPATIENT
Start: 2021-01-01 | End: 2021-01-01

## 2021-01-01 RX ORDER — CARVEDILOL 25 MG/1
25 TABLET ORAL 2 TIMES DAILY WITH MEALS
Status: DISCONTINUED | OUTPATIENT
Start: 2021-01-01 | End: 2021-01-01

## 2021-01-01 RX ORDER — LEVOTHYROXINE SODIUM 137 UG/1
137 TABLET ORAL DAILY
Status: ON HOLD
Start: 2021-01-01 | End: 2021-01-01

## 2021-01-01 RX ORDER — CLONIDINE HYDROCHLORIDE 0.3 MG/1
0.3 TABLET ORAL EVERY 8 HOURS SCHEDULED
Qty: 90 TABLET | Refills: 3 | Status: ON HOLD | OUTPATIENT
Start: 2021-01-01 | End: 2022-01-01

## 2021-01-01 RX ORDER — LISINOPRIL 40 MG/1
40 TABLET ORAL DAILY
Qty: 30 TABLET | Refills: 3 | Status: ON HOLD | OUTPATIENT
Start: 2021-01-01 | End: 2021-01-01

## 2021-01-01 RX ORDER — OXYCODONE HYDROCHLORIDE 5 MG/1
10 TABLET ORAL EVERY 4 HOURS PRN
Status: DISCONTINUED | OUTPATIENT
Start: 2021-01-01 | End: 2021-01-01

## 2021-01-01 RX ORDER — NIFEDIPINE 90 MG/1
90 TABLET, EXTENDED RELEASE ORAL
Status: ON HOLD
Start: 2021-01-01 | End: 2021-01-01

## 2021-01-01 RX ORDER — SODIUM POLYSTYRENE SULFONATE 15 G/60ML
15 SUSPENSION ORAL; RECTAL ONCE
Status: DISCONTINUED | OUTPATIENT
Start: 2021-01-01 | End: 2021-01-01 | Stop reason: HOSPADM

## 2021-01-01 RX ORDER — LORAZEPAM 2 MG/ML
1 INJECTION INTRAMUSCULAR ONCE
Status: COMPLETED | OUTPATIENT
Start: 2021-01-01 | End: 2021-01-01

## 2021-01-01 RX ORDER — DULOXETIN HYDROCHLORIDE 30 MG/1
30 CAPSULE, DELAYED RELEASE ORAL DAILY
Status: DISCONTINUED | OUTPATIENT
Start: 2021-01-01 | End: 2021-01-01 | Stop reason: HOSPADM

## 2021-01-01 RX ADMIN — DULOXETINE HYDROCHLORIDE 60 MG: 30 CAPSULE, DELAYED RELEASE ORAL at 08:50

## 2021-01-01 RX ADMIN — LABETALOL HYDROCHLORIDE 20 MG: 5 INJECTION, SOLUTION INTRAVENOUS at 16:56

## 2021-01-01 RX ADMIN — SODIUM CHLORIDE, PRESERVATIVE FREE 10 ML: 5 INJECTION INTRAVENOUS at 20:40

## 2021-01-01 RX ADMIN — NIFEDIPINE 60 MG: 60 TABLET, FILM COATED, EXTENDED RELEASE ORAL at 08:35

## 2021-01-01 RX ADMIN — CLONIDINE HYDROCHLORIDE 0.2 MG: 0.2 TABLET ORAL at 14:33

## 2021-01-01 RX ADMIN — CARVEDILOL 25 MG: 25 TABLET, FILM COATED ORAL at 17:03

## 2021-01-01 RX ADMIN — LISINOPRIL 20 MG: 20 TABLET ORAL at 23:21

## 2021-01-01 RX ADMIN — MORPHINE SULFATE 2 MG: 2 INJECTION, SOLUTION INTRAMUSCULAR; INTRAVENOUS at 16:22

## 2021-01-01 RX ADMIN — INSULIN DETEMIR 5 UNITS: 100 INJECTION, SOLUTION SUBCUTANEOUS at 11:17

## 2021-01-01 RX ADMIN — SPIRONOLACTONE 25 MG: 25 TABLET ORAL at 08:07

## 2021-01-01 RX ADMIN — CARVEDILOL 25 MG: 25 TABLET, FILM COATED ORAL at 07:56

## 2021-01-01 RX ADMIN — HYDRALAZINE HYDROCHLORIDE 100 MG: 50 TABLET ORAL at 06:31

## 2021-01-01 RX ADMIN — ONDANSETRON 4 MG: 2 INJECTION INTRAMUSCULAR; INTRAVENOUS at 04:57

## 2021-01-01 RX ADMIN — ONDANSETRON 4 MG: 2 INJECTION INTRAMUSCULAR; INTRAVENOUS at 16:21

## 2021-01-01 RX ADMIN — ONDANSETRON 4 MG: 2 INJECTION INTRAMUSCULAR; INTRAVENOUS at 05:55

## 2021-01-01 RX ADMIN — SODIUM CHLORIDE 5 MG/HR: 9 INJECTION, SOLUTION INTRAVENOUS at 12:02

## 2021-01-01 RX ADMIN — INSULIN LISPRO 4 UNITS: 100 INJECTION, SOLUTION INTRAVENOUS; SUBCUTANEOUS at 07:30

## 2021-01-01 RX ADMIN — ATORVASTATIN CALCIUM 20 MG: 10 TABLET, FILM COATED ORAL at 08:28

## 2021-01-01 RX ADMIN — ROPINIROLE HYDROCHLORIDE 4 MG: 1 TABLET, FILM COATED ORAL at 21:31

## 2021-01-01 RX ADMIN — SODIUM CHLORIDE 15 MG/HR: 9 INJECTION, SOLUTION INTRAVENOUS at 19:17

## 2021-01-01 RX ADMIN — IPRATROPIUM BROMIDE AND ALBUTEROL SULFATE 3 ML: 2.5; .5 SOLUTION RESPIRATORY (INHALATION) at 23:41

## 2021-01-01 RX ADMIN — DULOXETINE HYDROCHLORIDE 60 MG: 30 CAPSULE, DELAYED RELEASE ORAL at 08:13

## 2021-01-01 RX ADMIN — ACETAMINOPHEN 650 MG: 325 TABLET, FILM COATED ORAL at 01:48

## 2021-01-01 RX ADMIN — SODIUM CHLORIDE 5 MG/HR: 9 INJECTION, SOLUTION INTRAVENOUS at 10:51

## 2021-01-01 RX ADMIN — INSULIN LISPRO 2 UNITS: 100 INJECTION, SOLUTION INTRAVENOUS; SUBCUTANEOUS at 17:26

## 2021-01-01 RX ADMIN — LEVOTHYROXINE SODIUM 137 MCG: 112 TABLET ORAL at 08:29

## 2021-01-01 RX ADMIN — ROPINIROLE HYDROCHLORIDE 2 MG: 1 TABLET, FILM COATED ORAL at 20:15

## 2021-01-01 RX ADMIN — SODIUM CHLORIDE 7 UNITS/HR: 9 INJECTION, SOLUTION INTRAVENOUS at 14:01

## 2021-01-01 RX ADMIN — FAMOTIDINE 20 MG: 20 TABLET, FILM COATED ORAL at 11:47

## 2021-01-01 RX ADMIN — CLONIDINE HYDROCHLORIDE 0.2 MG: 0.2 TABLET ORAL at 15:03

## 2021-01-01 RX ADMIN — ALBUTEROL SULFATE 10 MG: 2.5 SOLUTION RESPIRATORY (INHALATION) at 15:53

## 2021-01-01 RX ADMIN — SODIUM CHLORIDE, PRESERVATIVE FREE 3 ML: 5 INJECTION INTRAVENOUS at 20:43

## 2021-01-01 RX ADMIN — FAMOTIDINE 20 MG: 10 INJECTION INTRAVENOUS at 08:33

## 2021-01-01 RX ADMIN — ORPHENADRINE CITRATE 60 MG: 60 INJECTION INTRAMUSCULAR; INTRAVENOUS at 08:12

## 2021-01-01 RX ADMIN — ONDANSETRON 4 MG: 2 INJECTION INTRAMUSCULAR; INTRAVENOUS at 09:42

## 2021-01-01 RX ADMIN — SODIUM CHLORIDE 7.5 MG/HR: 9 INJECTION, SOLUTION INTRAVENOUS at 09:31

## 2021-01-01 RX ADMIN — FAMOTIDINE 20 MG: 10 INJECTION INTRAVENOUS at 08:35

## 2021-01-01 RX ADMIN — GABAPENTIN 300 MG: 300 CAPSULE ORAL at 11:26

## 2021-01-01 RX ADMIN — INSULIN LISPRO 6 UNITS: 100 INJECTION, SOLUTION INTRAVENOUS; SUBCUTANEOUS at 18:02

## 2021-01-01 RX ADMIN — SODIUM CHLORIDE, PRESERVATIVE FREE 10 ML: 5 INJECTION INTRAVENOUS at 08:01

## 2021-01-01 RX ADMIN — ENOXAPARIN SODIUM 30 MG: 30 INJECTION SUBCUTANEOUS at 20:23

## 2021-01-01 RX ADMIN — INSULIN LISPRO 4 UNITS: 100 INJECTION, SOLUTION INTRAVENOUS; SUBCUTANEOUS at 18:01

## 2021-01-01 RX ADMIN — INSULIN DETEMIR 10 UNITS: 100 INJECTION, SOLUTION SUBCUTANEOUS at 20:23

## 2021-01-01 RX ADMIN — OXYCODONE HYDROCHLORIDE 10 MG: 10 TABLET, FILM COATED, EXTENDED RELEASE ORAL at 12:31

## 2021-01-01 RX ADMIN — NIFEDIPINE 60 MG: 60 TABLET, FILM COATED, EXTENDED RELEASE ORAL at 08:04

## 2021-01-01 RX ADMIN — HYDRALAZINE HYDROCHLORIDE 50 MG: 50 TABLET ORAL at 20:39

## 2021-01-01 RX ADMIN — SODIUM CHLORIDE 5 MG/HR: 9 INJECTION, SOLUTION INTRAVENOUS at 03:38

## 2021-01-01 RX ADMIN — SODIUM CHLORIDE, PRESERVATIVE FREE 10 ML: 5 INJECTION INTRAVENOUS at 21:22

## 2021-01-01 RX ADMIN — LEVOTHYROXINE SODIUM 137 MCG: 112 TABLET ORAL at 12:39

## 2021-01-01 RX ADMIN — INSULIN DETEMIR 15 UNITS: 100 INJECTION, SOLUTION SUBCUTANEOUS at 08:15

## 2021-01-01 RX ADMIN — HYDRALAZINE HYDROCHLORIDE 75 MG: 50 TABLET ORAL at 08:14

## 2021-01-01 RX ADMIN — HYDROCODONE BITARTRATE AND ACETAMINOPHEN 1 TABLET: 5; 325 TABLET ORAL at 18:05

## 2021-01-01 RX ADMIN — SODIUM CHLORIDE, PRESERVATIVE FREE 10 ML: 5 INJECTION INTRAVENOUS at 08:05

## 2021-01-01 RX ADMIN — CLONIDINE HYDROCHLORIDE 0.3 MG: 0.2 TABLET ORAL at 22:23

## 2021-01-01 RX ADMIN — LACTULOSE 20 G: 20 SOLUTION ORAL at 21:46

## 2021-01-01 RX ADMIN — INSULIN LISPRO 4 UNITS: 100 INJECTION, SOLUTION INTRAVENOUS; SUBCUTANEOUS at 11:25

## 2021-01-01 RX ADMIN — INSULIN LISPRO 8 UNITS: 100 INJECTION, SOLUTION INTRAVENOUS; SUBCUTANEOUS at 23:10

## 2021-01-01 RX ADMIN — FAMOTIDINE 20 MG: 20 TABLET, FILM COATED ORAL at 08:04

## 2021-01-01 RX ADMIN — LACTULOSE 20 G: 20 SOLUTION ORAL at 08:50

## 2021-01-01 RX ADMIN — ENOXAPARIN SODIUM 30 MG: 30 INJECTION SUBCUTANEOUS at 20:20

## 2021-01-01 RX ADMIN — STANDARDIZED SENNA CONCENTRATE 1 TABLET: 8.6 TABLET ORAL at 21:47

## 2021-01-01 RX ADMIN — NIFEDIPINE 90 MG: 90 TABLET, EXTENDED RELEASE ORAL at 12:33

## 2021-01-01 RX ADMIN — ATORVASTATIN CALCIUM 20 MG: 10 TABLET, FILM COATED ORAL at 13:15

## 2021-01-01 RX ADMIN — NIFEDIPINE 60 MG: 60 TABLET, FILM COATED, EXTENDED RELEASE ORAL at 21:31

## 2021-01-01 RX ADMIN — ONDANSETRON 4 MG: 2 INJECTION INTRAMUSCULAR; INTRAVENOUS at 05:40

## 2021-01-01 RX ADMIN — LEVOTHYROXINE SODIUM 50 MCG: 50 TABLET ORAL at 05:34

## 2021-01-01 RX ADMIN — ONDANSETRON 4 MG: 2 INJECTION INTRAMUSCULAR; INTRAVENOUS at 15:08

## 2021-01-01 RX ADMIN — INSULIN LISPRO 2 UNITS: 100 INJECTION, SOLUTION INTRAVENOUS; SUBCUTANEOUS at 17:14

## 2021-01-01 RX ADMIN — ROPINIROLE HYDROCHLORIDE 2 MG: 1 TABLET, FILM COATED ORAL at 21:47

## 2021-01-01 RX ADMIN — SODIUM CHLORIDE 250 ML/HR: 9 INJECTION, SOLUTION INTRAVENOUS at 02:50

## 2021-01-01 RX ADMIN — GABAPENTIN 300 MG: 300 CAPSULE ORAL at 20:27

## 2021-01-01 RX ADMIN — HYDRALAZINE HYDROCHLORIDE 100 MG: 50 TABLET ORAL at 22:25

## 2021-01-01 RX ADMIN — CALCIUM GLUCONATE 1 G: 98 INJECTION, SOLUTION INTRAVENOUS at 15:42

## 2021-01-01 RX ADMIN — LACTULOSE 20 G: 20 SOLUTION ORAL at 15:28

## 2021-01-01 RX ADMIN — DULOXETINE HYDROCHLORIDE 30 MG: 30 CAPSULE, DELAYED RELEASE ORAL at 10:16

## 2021-01-01 RX ADMIN — HYDRALAZINE HYDROCHLORIDE 10 MG: 20 INJECTION INTRAMUSCULAR; INTRAVENOUS at 12:02

## 2021-01-01 RX ADMIN — CARVEDILOL 12.5 MG: 6.25 TABLET, FILM COATED ORAL at 17:58

## 2021-01-01 RX ADMIN — LEVOTHYROXINE SODIUM 137 MCG: 112 TABLET ORAL at 08:01

## 2021-01-01 RX ADMIN — IPRATROPIUM BROMIDE AND ALBUTEROL SULFATE 3 ML: 2.5; .5 SOLUTION RESPIRATORY (INHALATION) at 02:38

## 2021-01-01 RX ADMIN — LACTULOSE 20 G: 20 SOLUTION ORAL at 20:36

## 2021-01-01 RX ADMIN — OXYCODONE HYDROCHLORIDE 10 MG: 5 TABLET ORAL at 11:51

## 2021-01-01 RX ADMIN — FAMOTIDINE 20 MG: 20 TABLET, FILM COATED ORAL at 12:33

## 2021-01-01 RX ADMIN — ONDANSETRON 4 MG: 2 INJECTION INTRAMUSCULAR; INTRAVENOUS at 01:04

## 2021-01-01 RX ADMIN — SODIUM CHLORIDE, PRESERVATIVE FREE 3 ML: 5 INJECTION INTRAVENOUS at 08:05

## 2021-01-01 RX ADMIN — HYDRALAZINE HYDROCHLORIDE 40 MG: 20 INJECTION INTRAMUSCULAR; INTRAVENOUS at 00:52

## 2021-01-01 RX ADMIN — INSULIN LISPRO 24 UNITS: 100 INJECTION, SOLUTION INTRAVENOUS; SUBCUTANEOUS at 10:40

## 2021-01-01 RX ADMIN — INSULIN LISPRO 6 UNITS: 100 INJECTION, SOLUTION INTRAVENOUS; SUBCUTANEOUS at 17:04

## 2021-01-01 RX ADMIN — NALOXONE HYDROCHLORIDE 0.4 MG: 0.4 INJECTION, SOLUTION INTRAMUSCULAR; INTRAVENOUS; SUBCUTANEOUS at 00:32

## 2021-01-01 RX ADMIN — IPRATROPIUM BROMIDE AND ALBUTEROL SULFATE 3 ML: 2.5; .5 SOLUTION RESPIRATORY (INHALATION) at 07:15

## 2021-01-01 RX ADMIN — LEVOTHYROXINE SODIUM 50 MCG: 50 TABLET ORAL at 05:03

## 2021-01-01 RX ADMIN — OXYCODONE HYDROCHLORIDE 5 MG: 5 TABLET ORAL at 16:17

## 2021-01-01 RX ADMIN — ATORVASTATIN CALCIUM 10 MG: 10 TABLET, FILM COATED ORAL at 13:47

## 2021-01-01 RX ADMIN — HYDRALAZINE HYDROCHLORIDE 75 MG: 50 TABLET ORAL at 05:02

## 2021-01-01 RX ADMIN — LEVOFLOXACIN 750 MG: 750 INJECTION, SOLUTION INTRAVENOUS at 00:32

## 2021-01-01 RX ADMIN — ONDANSETRON 4 MG: 2 INJECTION INTRAMUSCULAR; INTRAVENOUS at 16:33

## 2021-01-01 RX ADMIN — INSULIN DETEMIR 15 UNITS: 100 INJECTION, SOLUTION SUBCUTANEOUS at 08:05

## 2021-01-01 RX ADMIN — DULOXETINE HYDROCHLORIDE 60 MG: 30 CAPSULE, DELAYED RELEASE ORAL at 08:33

## 2021-01-01 RX ADMIN — SODIUM CHLORIDE, PRESERVATIVE FREE 10 ML: 5 INJECTION INTRAVENOUS at 21:31

## 2021-01-01 RX ADMIN — LIDOCAINE 1 PATCH: 50 PATCH CUTANEOUS at 20:35

## 2021-01-01 RX ADMIN — ENOXAPARIN SODIUM 30 MG: 30 INJECTION SUBCUTANEOUS at 21:31

## 2021-01-01 RX ADMIN — CARVEDILOL 25 MG: 25 TABLET, FILM COATED ORAL at 17:17

## 2021-01-01 RX ADMIN — ENOXAPARIN SODIUM 30 MG: 30 INJECTION SUBCUTANEOUS at 20:54

## 2021-01-01 RX ADMIN — CARVEDILOL 12.5 MG: 6.25 TABLET, FILM COATED ORAL at 18:02

## 2021-01-01 RX ADMIN — HYDRALAZINE HYDROCHLORIDE 100 MG: 50 TABLET ORAL at 15:49

## 2021-01-01 RX ADMIN — SODIUM CHLORIDE, PRESERVATIVE FREE 10 ML: 5 INJECTION INTRAVENOUS at 08:14

## 2021-01-01 RX ADMIN — FAMOTIDINE 20 MG: 10 INJECTION INTRAVENOUS at 11:50

## 2021-01-01 RX ADMIN — INSULIN DETEMIR 15 UNITS: 100 INJECTION, SOLUTION SUBCUTANEOUS at 08:54

## 2021-01-01 RX ADMIN — SODIUM CHLORIDE, PRESERVATIVE FREE 10 ML: 5 INJECTION INTRAVENOUS at 20:41

## 2021-01-01 RX ADMIN — SODIUM CHLORIDE 5 MG/HR: 9 INJECTION, SOLUTION INTRAVENOUS at 18:14

## 2021-01-01 RX ADMIN — STANDARDIZED SENNA CONCENTRATE 1 TABLET: 8.6 TABLET ORAL at 20:34

## 2021-01-01 RX ADMIN — SODIUM CHLORIDE, PRESERVATIVE FREE 10 ML: 5 INJECTION INTRAVENOUS at 20:37

## 2021-01-01 RX ADMIN — ATORVASTATIN CALCIUM 20 MG: 10 TABLET, FILM COATED ORAL at 08:18

## 2021-01-01 RX ADMIN — IPRATROPIUM BROMIDE AND ALBUTEROL SULFATE 3 ML: 2.5; .5 SOLUTION RESPIRATORY (INHALATION) at 00:02

## 2021-01-01 RX ADMIN — SODIUM CHLORIDE 5 MG/HR: 9 INJECTION, SOLUTION INTRAVENOUS at 17:20

## 2021-01-01 RX ADMIN — INSULIN DETEMIR 5 UNITS: 100 INJECTION, SOLUTION SUBCUTANEOUS at 00:58

## 2021-01-01 RX ADMIN — ATENOLOL 50 MG: 50 TABLET ORAL at 08:18

## 2021-01-01 RX ADMIN — HYDRALAZINE HYDROCHLORIDE 50 MG: 50 TABLET ORAL at 20:26

## 2021-01-01 RX ADMIN — LISINOPRIL 20 MG: 20 TABLET ORAL at 10:16

## 2021-01-01 RX ADMIN — OXYCODONE HYDROCHLORIDE 5 MG: 5 TABLET ORAL at 14:38

## 2021-01-01 RX ADMIN — CLONIDINE HYDROCHLORIDE 0.1 MG: 0.1 TABLET ORAL at 04:11

## 2021-01-01 RX ADMIN — SODIUM CHLORIDE, PRESERVATIVE FREE 10 ML: 5 INJECTION INTRAVENOUS at 20:17

## 2021-01-01 RX ADMIN — HYDROCODONE BITARTRATE AND ACETAMINOPHEN 1 TABLET: 5; 325 TABLET ORAL at 12:48

## 2021-01-01 RX ADMIN — CLONIDINE HYDROCHLORIDE 0.1 MG: 0.1 TABLET ORAL at 18:33

## 2021-01-01 RX ADMIN — CLONIDINE HYDROCHLORIDE 0.3 MG: 0.2 TABLET ORAL at 21:18

## 2021-01-01 RX ADMIN — CARVEDILOL 12.5 MG: 6.25 TABLET, FILM COATED ORAL at 11:13

## 2021-01-01 RX ADMIN — SODIUM CHLORIDE, PRESERVATIVE FREE 3 ML: 5 INJECTION INTRAVENOUS at 08:04

## 2021-01-01 RX ADMIN — ORPHENADRINE CITRATE 60 MG: 60 INJECTION INTRAMUSCULAR; INTRAVENOUS at 08:13

## 2021-01-01 RX ADMIN — CARVEDILOL 12.5 MG: 6.25 TABLET, FILM COATED ORAL at 11:17

## 2021-01-01 RX ADMIN — HYDRALAZINE HYDROCHLORIDE 50 MG: 50 TABLET ORAL at 16:56

## 2021-01-01 RX ADMIN — NIFEDIPINE 30 MG: 30 TABLET, FILM COATED, EXTENDED RELEASE ORAL at 13:11

## 2021-01-01 RX ADMIN — SODIUM CHLORIDE, PRESERVATIVE FREE 10 ML: 5 INJECTION INTRAVENOUS at 20:27

## 2021-01-01 RX ADMIN — NIFEDIPINE 30 MG: 30 TABLET, FILM COATED, EXTENDED RELEASE ORAL at 13:15

## 2021-01-01 RX ADMIN — HYDRALAZINE HYDROCHLORIDE 100 MG: 50 TABLET ORAL at 14:27

## 2021-01-01 RX ADMIN — HYDRALAZINE HYDROCHLORIDE 50 MG: 50 TABLET ORAL at 11:21

## 2021-01-01 RX ADMIN — ROPINIROLE HYDROCHLORIDE 2 MG: 1 TABLET, FILM COATED ORAL at 20:47

## 2021-01-01 RX ADMIN — DEXTROSE MONOHYDRATE 12.5 G: 500 INJECTION PARENTERAL at 20:57

## 2021-01-01 RX ADMIN — ROPINIROLE HYDROCHLORIDE 4 MG: 1 TABLET, FILM COATED ORAL at 20:40

## 2021-01-01 RX ADMIN — INSULIN DETEMIR 15 UNITS: 100 INJECTION, SOLUTION SUBCUTANEOUS at 11:51

## 2021-01-01 RX ADMIN — HYDRALAZINE HYDROCHLORIDE 10 MG: 20 INJECTION INTRAMUSCULAR; INTRAVENOUS at 04:33

## 2021-01-01 RX ADMIN — OXYCODONE HYDROCHLORIDE 5 MG: 5 TABLET ORAL at 02:40

## 2021-01-01 RX ADMIN — LISINOPRIL 20 MG: 20 TABLET ORAL at 21:31

## 2021-01-01 RX ADMIN — INSULIN LISPRO 4 UNITS: 100 INJECTION, SOLUTION INTRAVENOUS; SUBCUTANEOUS at 08:13

## 2021-01-01 RX ADMIN — FAMOTIDINE 20 MG: 20 TABLET, FILM COATED ORAL at 08:07

## 2021-01-01 RX ADMIN — SODIUM CHLORIDE 5 MG/HR: 9 INJECTION, SOLUTION INTRAVENOUS at 22:24

## 2021-01-01 RX ADMIN — HYDRALAZINE HYDROCHLORIDE 50 MG: 50 TABLET ORAL at 10:16

## 2021-01-01 RX ADMIN — OXYCODONE HYDROCHLORIDE 10 MG: 5 TABLET ORAL at 15:35

## 2021-01-01 RX ADMIN — GABAPENTIN 300 MG: 300 CAPSULE ORAL at 20:42

## 2021-01-01 RX ADMIN — FAMOTIDINE 20 MG: 10 INJECTION INTRAVENOUS at 16:41

## 2021-01-01 RX ADMIN — OXYCODONE HYDROCHLORIDE 5 MG: 5 TABLET ORAL at 21:19

## 2021-01-01 RX ADMIN — LIDOCAINE 1 PATCH: 50 PATCH CUTANEOUS at 20:15

## 2021-01-01 RX ADMIN — LACTULOSE 20 G: 20 SOLUTION ORAL at 22:24

## 2021-01-01 RX ADMIN — CARVEDILOL 12.5 MG: 6.25 TABLET, FILM COATED ORAL at 17:26

## 2021-01-01 RX ADMIN — HYDRALAZINE HYDROCHLORIDE 100 MG: 50 TABLET ORAL at 14:50

## 2021-01-01 RX ADMIN — OXYCODONE HYDROCHLORIDE 10 MG: 5 TABLET ORAL at 16:53

## 2021-01-01 RX ADMIN — INSULIN HUMAN 6 UNITS: 100 INJECTION, SOLUTION PARENTERAL at 06:09

## 2021-01-01 RX ADMIN — LISINOPRIL 20 MG: 20 TABLET ORAL at 08:04

## 2021-01-01 RX ADMIN — SODIUM CHLORIDE, PRESERVATIVE FREE 10 ML: 5 INJECTION INTRAVENOUS at 21:21

## 2021-01-01 RX ADMIN — INSULIN DETEMIR 5 UNITS: 100 INJECTION, SOLUTION SUBCUTANEOUS at 20:21

## 2021-01-01 RX ADMIN — INSULIN LISPRO 2 UNITS: 100 INJECTION, SOLUTION INTRAVENOUS; SUBCUTANEOUS at 11:52

## 2021-01-01 RX ADMIN — FAMOTIDINE 20 MG: 20 TABLET, FILM COATED ORAL at 08:14

## 2021-01-01 RX ADMIN — DEXTROSE AND SODIUM CHLORIDE 50 ML/HR: 5; 450 INJECTION, SOLUTION INTRAVENOUS at 03:13

## 2021-01-01 RX ADMIN — PREGABALIN 75 MG: 75 CAPSULE ORAL at 08:42

## 2021-01-01 RX ADMIN — SODIUM CHLORIDE, PRESERVATIVE FREE 3 ML: 5 INJECTION INTRAVENOUS at 08:18

## 2021-01-01 RX ADMIN — FAMOTIDINE 20 MG: 10 INJECTION INTRAVENOUS at 08:02

## 2021-01-01 RX ADMIN — ACETAMINOPHEN 650 MG: 325 TABLET, FILM COATED ORAL at 16:55

## 2021-01-01 RX ADMIN — SODIUM CHLORIDE 5 MG/HR: 9 INJECTION, SOLUTION INTRAVENOUS at 17:42

## 2021-01-01 RX ADMIN — PREGABALIN 75 MG: 75 CAPSULE ORAL at 11:12

## 2021-01-01 RX ADMIN — SODIUM CHLORIDE 12.5 MG/HR: 9 INJECTION, SOLUTION INTRAVENOUS at 03:11

## 2021-01-01 RX ADMIN — ATENOLOL 50 MG: 50 TABLET ORAL at 10:45

## 2021-01-01 RX ADMIN — LACTULOSE 20 G: 20 SOLUTION ORAL at 08:35

## 2021-01-01 RX ADMIN — HYDRALAZINE HYDROCHLORIDE 50 MG: 50 TABLET ORAL at 08:14

## 2021-01-01 RX ADMIN — LACTULOSE 20 G: 20 SOLUTION ORAL at 20:26

## 2021-01-01 RX ADMIN — ATORVASTATIN CALCIUM 20 MG: 10 TABLET, FILM COATED ORAL at 08:07

## 2021-01-01 RX ADMIN — CLONIDINE HYDROCHLORIDE 0.2 MG: 0.2 TABLET ORAL at 03:58

## 2021-01-01 RX ADMIN — CARVEDILOL 6.25 MG: 6.25 TABLET, FILM COATED ORAL at 08:13

## 2021-01-01 RX ADMIN — CLONIDINE HYDROCHLORIDE 0.3 MG: 0.2 TABLET ORAL at 22:25

## 2021-01-01 RX ADMIN — HYDRALAZINE HYDROCHLORIDE 75 MG: 50 TABLET ORAL at 21:18

## 2021-01-01 RX ADMIN — HYDRALAZINE HYDROCHLORIDE 10 MG: 20 INJECTION INTRAMUSCULAR; INTRAVENOUS at 12:31

## 2021-01-01 RX ADMIN — HYDRALAZINE HYDROCHLORIDE 100 MG: 50 TABLET ORAL at 21:48

## 2021-01-01 RX ADMIN — OXYCODONE HYDROCHLORIDE 10 MG: 10 TABLET, FILM COATED, EXTENDED RELEASE ORAL at 08:06

## 2021-01-01 RX ADMIN — LISINOPRIL 20 MG: 20 TABLET ORAL at 22:23

## 2021-01-01 RX ADMIN — LACTULOSE 20 G: 20 SOLUTION ORAL at 08:45

## 2021-01-01 RX ADMIN — OXYCODONE HYDROCHLORIDE 10 MG: 10 TABLET, FILM COATED, EXTENDED RELEASE ORAL at 21:47

## 2021-01-01 RX ADMIN — INSULIN LISPRO 7 UNITS: 100 INJECTION, SOLUTION INTRAVENOUS; SUBCUTANEOUS at 18:15

## 2021-01-01 RX ADMIN — ONDANSETRON 4 MG: 2 INJECTION INTRAMUSCULAR; INTRAVENOUS at 21:05

## 2021-01-01 RX ADMIN — LEVOTHYROXINE SODIUM 50 MCG: 50 TABLET ORAL at 05:20

## 2021-01-01 RX ADMIN — HYDRALAZINE HYDROCHLORIDE 10 MG: 20 INJECTION INTRAMUSCULAR; INTRAVENOUS at 20:04

## 2021-01-01 RX ADMIN — STANDARDIZED SENNA CONCENTRATE 1 TABLET: 8.6 TABLET ORAL at 20:35

## 2021-01-01 RX ADMIN — FAMOTIDINE 20 MG: 20 TABLET, FILM COATED ORAL at 08:18

## 2021-01-01 RX ADMIN — ONDANSETRON 4 MG: 2 INJECTION INTRAMUSCULAR; INTRAVENOUS at 08:12

## 2021-01-01 RX ADMIN — IPRATROPIUM BROMIDE AND ALBUTEROL SULFATE 3 ML: 2.5; .5 SOLUTION RESPIRATORY (INHALATION) at 18:47

## 2021-01-01 RX ADMIN — ORPHENADRINE CITRATE 100 MG: 100 TABLET, EXTENDED RELEASE ORAL at 20:37

## 2021-01-01 RX ADMIN — CLONIDINE HYDROCHLORIDE 0.3 MG: 0.2 TABLET ORAL at 21:10

## 2021-01-01 RX ADMIN — HYDRALAZINE HYDROCHLORIDE 100 MG: 50 TABLET ORAL at 21:19

## 2021-01-01 RX ADMIN — OXYCODONE HYDROCHLORIDE 5 MG: 5 TABLET ORAL at 10:45

## 2021-01-01 RX ADMIN — INSULIN LISPRO 10 UNITS: 100 INJECTION, SOLUTION INTRAVENOUS; SUBCUTANEOUS at 08:32

## 2021-01-01 RX ADMIN — ATENOLOL 50 MG: 50 TABLET ORAL at 08:14

## 2021-01-01 RX ADMIN — LISINOPRIL 20 MG: 20 TABLET ORAL at 10:41

## 2021-01-01 RX ADMIN — SODIUM CHLORIDE, PRESERVATIVE FREE 10 ML: 5 INJECTION INTRAVENOUS at 08:15

## 2021-01-01 RX ADMIN — CARVEDILOL 25 MG: 25 TABLET, FILM COATED ORAL at 08:33

## 2021-01-01 RX ADMIN — ROPINIROLE HYDROCHLORIDE 2 MG: 1 TABLET, FILM COATED ORAL at 20:28

## 2021-01-01 RX ADMIN — ATORVASTATIN CALCIUM 10 MG: 10 TABLET, FILM COATED ORAL at 20:40

## 2021-01-01 RX ADMIN — CARVEDILOL 25 MG: 25 TABLET, FILM COATED ORAL at 17:05

## 2021-01-01 RX ADMIN — LISINOPRIL 20 MG: 20 TABLET ORAL at 08:36

## 2021-01-01 RX ADMIN — DULOXETINE HYDROCHLORIDE 60 MG: 30 CAPSULE, DELAYED RELEASE ORAL at 08:29

## 2021-01-01 RX ADMIN — CARVEDILOL 25 MG: 25 TABLET, FILM COATED ORAL at 18:02

## 2021-01-01 RX ADMIN — CLONIDINE HYDROCHLORIDE 0.2 MG: 0.2 TABLET ORAL at 21:59

## 2021-01-01 RX ADMIN — NIFEDIPINE 30 MG: 30 TABLET, FILM COATED, EXTENDED RELEASE ORAL at 08:13

## 2021-01-01 RX ADMIN — CLONIDINE HYDROCHLORIDE 0.1 MG: 0.1 TABLET ORAL at 13:51

## 2021-01-01 RX ADMIN — ROPINIROLE HYDROCHLORIDE 2 MG: 1 TABLET, FILM COATED ORAL at 21:22

## 2021-01-01 RX ADMIN — HYDRALAZINE HYDROCHLORIDE 75 MG: 50 TABLET ORAL at 22:23

## 2021-01-01 RX ADMIN — LEVOTHYROXINE SODIUM 137 MCG: 112 TABLET ORAL at 13:15

## 2021-01-01 RX ADMIN — CARVEDILOL 12.5 MG: 6.25 TABLET, FILM COATED ORAL at 17:02

## 2021-01-01 RX ADMIN — HYDRALAZINE HYDROCHLORIDE 100 MG: 50 TABLET ORAL at 15:25

## 2021-01-01 RX ADMIN — HYDRALAZINE HYDROCHLORIDE 50 MG: 50 TABLET ORAL at 08:35

## 2021-01-01 RX ADMIN — ATORVASTATIN CALCIUM 20 MG: 10 TABLET, FILM COATED ORAL at 08:02

## 2021-01-01 RX ADMIN — ATORVASTATIN CALCIUM 20 MG: 10 TABLET, FILM COATED ORAL at 08:13

## 2021-01-01 RX ADMIN — NIFEDIPINE 90 MG: 90 TABLET, EXTENDED RELEASE ORAL at 11:16

## 2021-01-01 RX ADMIN — ATORVASTATIN CALCIUM 10 MG: 10 TABLET, FILM COATED ORAL at 08:42

## 2021-01-01 RX ADMIN — INSULIN LISPRO 4 UNITS: 100 INJECTION, SOLUTION INTRAVENOUS; SUBCUTANEOUS at 08:50

## 2021-01-01 RX ADMIN — CARVEDILOL 25 MG: 25 TABLET, FILM COATED ORAL at 18:26

## 2021-01-01 RX ADMIN — FAMOTIDINE 20 MG: 20 TABLET, FILM COATED ORAL at 08:43

## 2021-01-01 RX ADMIN — INSULIN DETEMIR 15 UNITS: 100 INJECTION, SOLUTION SUBCUTANEOUS at 08:14

## 2021-01-01 RX ADMIN — HYDRALAZINE HYDROCHLORIDE 20 MG: 20 INJECTION INTRAMUSCULAR; INTRAVENOUS at 15:16

## 2021-01-01 RX ADMIN — SODIUM CHLORIDE, PRESERVATIVE FREE 10 ML: 5 INJECTION INTRAVENOUS at 10:17

## 2021-01-01 RX ADMIN — ORPHENADRINE CITRATE 60 MG: 60 INJECTION INTRAMUSCULAR; INTRAVENOUS at 07:59

## 2021-01-01 RX ADMIN — SODIUM CHLORIDE, PRESERVATIVE FREE 10 ML: 5 INJECTION INTRAVENOUS at 21:47

## 2021-01-01 RX ADMIN — OXYCODONE HYDROCHLORIDE 10 MG: 5 TABLET ORAL at 23:35

## 2021-01-01 RX ADMIN — OXYCODONE HYDROCHLORIDE 10 MG: 5 TABLET ORAL at 08:12

## 2021-01-01 RX ADMIN — CARVEDILOL 12.5 MG: 6.25 TABLET, FILM COATED ORAL at 08:29

## 2021-01-01 RX ADMIN — CEFTRIAXONE SODIUM 2 G: 2 INJECTION, POWDER, FOR SOLUTION INTRAMUSCULAR; INTRAVENOUS at 23:19

## 2021-01-01 RX ADMIN — INSULIN LISPRO 10 UNITS: 100 INJECTION, SOLUTION INTRAVENOUS; SUBCUTANEOUS at 11:39

## 2021-01-01 RX ADMIN — CLONIDINE HYDROCHLORIDE 0.2 MG: 0.2 TABLET ORAL at 22:05

## 2021-01-01 RX ADMIN — GABAPENTIN 300 MG: 300 CAPSULE ORAL at 21:47

## 2021-01-01 RX ADMIN — GABAPENTIN 300 MG: 300 CAPSULE ORAL at 08:28

## 2021-01-01 RX ADMIN — HYDRALAZINE HYDROCHLORIDE 75 MG: 50 TABLET ORAL at 13:51

## 2021-01-01 RX ADMIN — ATORVASTATIN CALCIUM 20 MG: 10 TABLET, FILM COATED ORAL at 08:14

## 2021-01-01 RX ADMIN — GABAPENTIN 300 MG: 300 CAPSULE ORAL at 20:14

## 2021-01-01 RX ADMIN — OXYCODONE HYDROCHLORIDE 10 MG: 5 TABLET ORAL at 11:35

## 2021-01-01 RX ADMIN — LEVOTHYROXINE SODIUM 50 MCG: 50 TABLET ORAL at 06:23

## 2021-01-01 RX ADMIN — POTASSIUM CHLORIDE 20 MEQ: 14.9 INJECTION, SOLUTION INTRAVENOUS at 06:55

## 2021-01-01 RX ADMIN — HYDRALAZINE HYDROCHLORIDE 50 MG: 50 TABLET ORAL at 20:20

## 2021-01-01 RX ADMIN — OXYCODONE HYDROCHLORIDE 5 MG: 5 TABLET ORAL at 14:49

## 2021-01-01 RX ADMIN — INSULIN LISPRO 2 UNITS: 100 INJECTION, SOLUTION INTRAVENOUS; SUBCUTANEOUS at 11:51

## 2021-01-01 RX ADMIN — HYDROXYZINE HYDROCHLORIDE 25 MG: 25 TABLET ORAL at 18:12

## 2021-01-01 RX ADMIN — NIFEDIPINE 90 MG: 90 TABLET, EXTENDED RELEASE ORAL at 12:38

## 2021-01-01 RX ADMIN — SODIUM CHLORIDE, PRESERVATIVE FREE 10 ML: 5 INJECTION INTRAVENOUS at 20:06

## 2021-01-01 RX ADMIN — INSULIN DETEMIR 15 UNITS: 100 INJECTION, SOLUTION SUBCUTANEOUS at 12:31

## 2021-01-01 RX ADMIN — INSULIN LISPRO 2 UNITS: 100 INJECTION, SOLUTION INTRAVENOUS; SUBCUTANEOUS at 06:46

## 2021-01-01 RX ADMIN — ENOXAPARIN SODIUM 30 MG: 30 INJECTION SUBCUTANEOUS at 21:46

## 2021-01-01 RX ADMIN — OXYCODONE HYDROCHLORIDE 5 MG: 5 TABLET ORAL at 20:39

## 2021-01-01 RX ADMIN — CARVEDILOL 12.5 MG: 6.25 TABLET, FILM COATED ORAL at 10:16

## 2021-01-01 RX ADMIN — ATORVASTATIN CALCIUM 20 MG: 10 TABLET, FILM COATED ORAL at 08:50

## 2021-01-01 RX ADMIN — STANDARDIZED SENNA CONCENTRATE 1 TABLET: 8.6 TABLET ORAL at 21:22

## 2021-01-01 RX ADMIN — SODIUM CHLORIDE 4 UNITS/HR: 9 INJECTION, SOLUTION INTRAVENOUS at 15:46

## 2021-01-01 RX ADMIN — HYDRALAZINE HYDROCHLORIDE 75 MG: 50 TABLET ORAL at 21:31

## 2021-01-01 RX ADMIN — CLONIDINE HYDROCHLORIDE 0.3 MG: 0.2 TABLET ORAL at 06:23

## 2021-01-01 RX ADMIN — SODIUM CHLORIDE 1000 ML: 9 INJECTION, SOLUTION INTRAVENOUS at 23:47

## 2021-01-01 RX ADMIN — SODIUM CHLORIDE, PRESERVATIVE FREE 10 ML: 5 INJECTION INTRAVENOUS at 20:47

## 2021-01-01 RX ADMIN — HYDRALAZINE HYDROCHLORIDE 75 MG: 50 TABLET ORAL at 05:34

## 2021-01-01 RX ADMIN — LEVOTHYROXINE SODIUM 25 MCG: 25 TABLET ORAL at 06:00

## 2021-01-01 RX ADMIN — HYDRALAZINE HYDROCHLORIDE 75 MG: 50 TABLET ORAL at 15:39

## 2021-01-01 RX ADMIN — SODIUM CHLORIDE, PRESERVATIVE FREE 10 ML: 5 INJECTION INTRAVENOUS at 21:37

## 2021-01-01 RX ADMIN — ISOSORBIDE MONONITRATE 60 MG: 60 TABLET, EXTENDED RELEASE ORAL at 15:39

## 2021-01-01 RX ADMIN — CARVEDILOL 12.5 MG: 6.25 TABLET, FILM COATED ORAL at 18:55

## 2021-01-01 RX ADMIN — INSULIN LISPRO 2 UNITS: 100 INJECTION, SOLUTION INTRAVENOUS; SUBCUTANEOUS at 08:10

## 2021-01-01 RX ADMIN — LABETALOL HYDROCHLORIDE 20 MG: 5 INJECTION, SOLUTION INTRAVENOUS at 09:14

## 2021-01-01 RX ADMIN — ORPHENADRINE CITRATE 60 MG: 60 INJECTION INTRAMUSCULAR; INTRAVENOUS at 21:19

## 2021-01-01 RX ADMIN — HYDRALAZINE HYDROCHLORIDE 100 MG: 50 TABLET ORAL at 05:40

## 2021-01-01 RX ADMIN — INSULIN DETEMIR 10 UNITS: 100 INJECTION, SOLUTION SUBCUTANEOUS at 22:27

## 2021-01-01 RX ADMIN — CARVEDILOL 12.5 MG: 6.25 TABLET, FILM COATED ORAL at 17:12

## 2021-01-01 RX ADMIN — HYDRALAZINE HYDROCHLORIDE 50 MG: 50 TABLET ORAL at 21:22

## 2021-01-01 RX ADMIN — SODIUM CHLORIDE, PRESERVATIVE FREE 10 ML: 5 INJECTION INTRAVENOUS at 20:15

## 2021-01-01 RX ADMIN — ORPHENADRINE CITRATE 100 MG: 100 TABLET, EXTENDED RELEASE ORAL at 08:30

## 2021-01-01 RX ADMIN — INSULIN LISPRO 4 UNITS: 100 INJECTION, SOLUTION INTRAVENOUS; SUBCUTANEOUS at 08:28

## 2021-01-01 RX ADMIN — DULOXETINE HYDROCHLORIDE 60 MG: 30 CAPSULE, DELAYED RELEASE ORAL at 12:38

## 2021-01-01 RX ADMIN — CLONIDINE HYDROCHLORIDE 0.3 MG: 0.2 TABLET ORAL at 13:52

## 2021-01-01 RX ADMIN — ENOXAPARIN SODIUM 30 MG: 30 INJECTION SUBCUTANEOUS at 20:17

## 2021-01-01 RX ADMIN — CLONIDINE HYDROCHLORIDE 0.2 MG: 0.2 TABLET ORAL at 15:12

## 2021-01-01 RX ADMIN — ORPHENADRINE CITRATE 100 MG: 100 TABLET, EXTENDED RELEASE ORAL at 21:46

## 2021-01-01 RX ADMIN — CLONIDINE HYDROCHLORIDE 0.2 MG: 0.2 TABLET ORAL at 05:58

## 2021-01-01 RX ADMIN — FAMOTIDINE 20 MG: 20 TABLET, FILM COATED ORAL at 11:17

## 2021-01-01 RX ADMIN — SODIUM CHLORIDE, PRESERVATIVE FREE 10 ML: 5 INJECTION INTRAVENOUS at 22:31

## 2021-01-01 RX ADMIN — LACTULOSE 20 G: 20 SOLUTION ORAL at 15:51

## 2021-01-01 RX ADMIN — DULOXETINE HYDROCHLORIDE 60 MG: 30 CAPSULE, DELAYED RELEASE ORAL at 08:07

## 2021-01-01 RX ADMIN — SODIUM CHLORIDE, PRESERVATIVE FREE 10 ML: 5 INJECTION INTRAVENOUS at 08:16

## 2021-01-01 RX ADMIN — CLONIDINE HYDROCHLORIDE 0.2 MG: 0.2 TABLET ORAL at 21:48

## 2021-01-01 RX ADMIN — SODIUM CHLORIDE, PRESERVATIVE FREE 10 ML: 5 INJECTION INTRAVENOUS at 08:33

## 2021-01-01 RX ADMIN — DEXTROSE MONOHYDRATE 25 G: 500 INJECTION PARENTERAL at 01:00

## 2021-01-01 RX ADMIN — CLONIDINE HYDROCHLORIDE 0.2 MG: 0.2 TABLET ORAL at 06:20

## 2021-01-01 RX ADMIN — FAMOTIDINE 20 MG: 20 TABLET, FILM COATED ORAL at 08:03

## 2021-01-01 RX ADMIN — HYDRALAZINE HYDROCHLORIDE 75 MG: 50 TABLET ORAL at 20:28

## 2021-01-01 RX ADMIN — IOPAMIDOL 100 ML: 755 INJECTION, SOLUTION INTRAVENOUS at 05:58

## 2021-01-01 RX ADMIN — STANDARDIZED SENNA CONCENTRATE 1 TABLET: 8.6 TABLET ORAL at 20:28

## 2021-01-01 RX ADMIN — POTASSIUM CHLORIDE AND SODIUM CHLORIDE 250 ML/HR: 900; 150 INJECTION, SOLUTION INTRAVENOUS at 08:15

## 2021-01-01 RX ADMIN — NIFEDIPINE 60 MG: 60 TABLET, FILM COATED, EXTENDED RELEASE ORAL at 08:14

## 2021-01-01 RX ADMIN — HYDRALAZINE HYDROCHLORIDE 75 MG: 50 TABLET ORAL at 16:08

## 2021-01-01 RX ADMIN — LISINOPRIL 20 MG: 20 TABLET ORAL at 20:16

## 2021-01-01 RX ADMIN — DULOXETINE HYDROCHLORIDE 60 MG: 30 CAPSULE, DELAYED RELEASE ORAL at 13:15

## 2021-01-01 RX ADMIN — NICOTINE 1 PATCH: 14 PATCH, EXTENDED RELEASE TRANSDERMAL at 09:42

## 2021-01-01 RX ADMIN — HYDRALAZINE HYDROCHLORIDE 75 MG: 50 TABLET ORAL at 13:48

## 2021-01-01 RX ADMIN — OXYCODONE HYDROCHLORIDE 10 MG: 5 TABLET ORAL at 09:09

## 2021-01-01 RX ADMIN — ACETAMINOPHEN 650 MG: 325 TABLET ORAL at 21:31

## 2021-01-01 RX ADMIN — LISINOPRIL 10 MG: 10 TABLET ORAL at 08:07

## 2021-01-01 RX ADMIN — DULOXETINE HYDROCHLORIDE 30 MG: 30 CAPSULE, DELAYED RELEASE ORAL at 11:17

## 2021-01-01 RX ADMIN — ROPINIROLE HYDROCHLORIDE 2 MG: 1 TABLET, FILM COATED ORAL at 20:34

## 2021-01-01 RX ADMIN — CARVEDILOL 12.5 MG: 6.25 TABLET, FILM COATED ORAL at 08:34

## 2021-01-01 RX ADMIN — PREGABALIN 75 MG: 75 CAPSULE ORAL at 10:46

## 2021-01-01 RX ADMIN — OXYCODONE HYDROCHLORIDE 10 MG: 5 TABLET ORAL at 21:18

## 2021-01-01 RX ADMIN — CLONIDINE HYDROCHLORIDE 0.2 MG: 0.2 TABLET ORAL at 05:04

## 2021-01-01 RX ADMIN — HYDRALAZINE HYDROCHLORIDE 100 MG: 50 TABLET ORAL at 21:20

## 2021-01-01 RX ADMIN — INSULIN DETEMIR 10 UNITS: 100 INJECTION, SOLUTION SUBCUTANEOUS at 21:30

## 2021-01-01 RX ADMIN — CARVEDILOL 25 MG: 25 TABLET, FILM COATED ORAL at 17:00

## 2021-01-01 RX ADMIN — HYDRALAZINE HYDROCHLORIDE 50 MG: 50 TABLET ORAL at 06:23

## 2021-01-01 RX ADMIN — LEVOTHYROXINE SODIUM 25 MCG: 25 TABLET ORAL at 08:44

## 2021-01-01 RX ADMIN — CLONIDINE HYDROCHLORIDE 0.2 MG: 0.2 TABLET ORAL at 08:07

## 2021-01-01 RX ADMIN — NITROGLYCERIN 100 MCG/MIN: 20 INJECTION INTRAVENOUS at 04:35

## 2021-01-01 RX ADMIN — PREGABALIN 75 MG: 75 CAPSULE ORAL at 08:34

## 2021-01-01 RX ADMIN — FAMOTIDINE 20 MG: 20 TABLET, FILM COATED ORAL at 20:40

## 2021-01-01 RX ADMIN — CLONIDINE HYDROCHLORIDE 0.1 MG: 0.1 TABLET ORAL at 03:56

## 2021-01-01 RX ADMIN — NICOTINE 1 PATCH: 14 PATCH, EXTENDED RELEASE TRANSDERMAL at 08:43

## 2021-01-01 RX ADMIN — SODIUM CHLORIDE, PRESERVATIVE FREE 3 ML: 5 INJECTION INTRAVENOUS at 08:03

## 2021-01-01 RX ADMIN — NIFEDIPINE 60 MG: 60 TABLET, FILM COATED, EXTENDED RELEASE ORAL at 11:47

## 2021-01-01 RX ADMIN — SODIUM CHLORIDE 5 MG/HR: 9 INJECTION, SOLUTION INTRAVENOUS at 16:39

## 2021-01-01 RX ADMIN — STANDARDIZED SENNA CONCENTRATE 1 TABLET: 8.6 TABLET ORAL at 20:40

## 2021-01-01 RX ADMIN — SODIUM CHLORIDE, PRESERVATIVE FREE 3 ML: 5 INJECTION INTRAVENOUS at 21:12

## 2021-01-01 RX ADMIN — CLONIDINE HYDROCHLORIDE 0.3 MG: 0.2 TABLET ORAL at 13:00

## 2021-01-01 RX ADMIN — SODIUM CHLORIDE 2000 ML: 9 INJECTION, SOLUTION INTRAVENOUS at 00:58

## 2021-01-01 RX ADMIN — CLONIDINE HYDROCHLORIDE 0.3 MG: 0.2 TABLET ORAL at 13:48

## 2021-01-01 RX ADMIN — ROPINIROLE HYDROCHLORIDE 4 MG: 1 TABLET, FILM COATED ORAL at 22:31

## 2021-01-01 RX ADMIN — DULOXETINE HYDROCHLORIDE 60 MG: 30 CAPSULE, DELAYED RELEASE ORAL at 13:48

## 2021-01-01 RX ADMIN — LISINOPRIL 10 MG: 10 TABLET ORAL at 12:32

## 2021-01-01 RX ADMIN — ROPINIROLE HYDROCHLORIDE 4 MG: 1 TABLET, FILM COATED ORAL at 20:26

## 2021-01-01 RX ADMIN — ROPINIROLE HYDROCHLORIDE 2 MG: 1 TABLET, FILM COATED ORAL at 20:35

## 2021-01-01 RX ADMIN — NITROGLYCERIN 95 MCG/MIN: 20 INJECTION INTRAVENOUS at 10:22

## 2021-01-01 RX ADMIN — LACTULOSE 20 G: 20 SOLUTION ORAL at 22:31

## 2021-01-01 RX ADMIN — SPIRONOLACTONE 25 MG: 25 TABLET ORAL at 08:19

## 2021-01-01 RX ADMIN — CLONIDINE HYDROCHLORIDE 0.3 MG: 0.2 TABLET ORAL at 13:05

## 2021-01-01 RX ADMIN — SPIRONOLACTONE 25 MG: 25 TABLET ORAL at 08:04

## 2021-01-01 RX ADMIN — HYDRALAZINE HYDROCHLORIDE 75 MG: 50 TABLET ORAL at 22:24

## 2021-01-01 RX ADMIN — CLONIDINE HYDROCHLORIDE 0.1 MG: 0.1 TABLET ORAL at 03:10

## 2021-01-01 RX ADMIN — GABAPENTIN 300 MG: 300 CAPSULE ORAL at 15:28

## 2021-01-01 RX ADMIN — POTASSIUM CHLORIDE, DEXTROSE MONOHYDRATE AND SODIUM CHLORIDE 150 ML/HR: 150; 5; 900 INJECTION, SOLUTION INTRAVENOUS at 16:40

## 2021-01-01 RX ADMIN — LIDOCAINE 1 PATCH: 50 PATCH CUTANEOUS at 21:47

## 2021-01-01 RX ADMIN — HYDRALAZINE HYDROCHLORIDE 75 MG: 50 TABLET ORAL at 12:47

## 2021-01-01 RX ADMIN — IPRATROPIUM BROMIDE AND ALBUTEROL SULFATE 3 ML: 2.5; .5 SOLUTION RESPIRATORY (INHALATION) at 06:41

## 2021-01-01 RX ADMIN — SODIUM CHLORIDE, PRESERVATIVE FREE 10 ML: 5 INJECTION INTRAVENOUS at 08:57

## 2021-01-01 RX ADMIN — SODIUM CHLORIDE, PRESERVATIVE FREE 10 ML: 5 INJECTION INTRAVENOUS at 21:34

## 2021-01-01 RX ADMIN — DEXTROSE 15 G: 15 GEL ORAL at 13:47

## 2021-01-01 RX ADMIN — HYDRALAZINE HYDROCHLORIDE 75 MG: 50 TABLET ORAL at 17:03

## 2021-01-01 RX ADMIN — IPRATROPIUM BROMIDE AND ALBUTEROL SULFATE 3 ML: 2.5; .5 SOLUTION RESPIRATORY (INHALATION) at 11:33

## 2021-01-01 RX ADMIN — ATORVASTATIN CALCIUM 10 MG: 10 TABLET, FILM COATED ORAL at 12:33

## 2021-01-01 RX ADMIN — CARVEDILOL 25 MG: 25 TABLET, FILM COATED ORAL at 08:18

## 2021-01-01 RX ADMIN — CLONIDINE HYDROCHLORIDE 0.3 MG: 0.2 TABLET ORAL at 05:34

## 2021-01-01 RX ADMIN — DEXTROSE MONOHYDRATE 25 G: 500 INJECTION PARENTERAL at 03:11

## 2021-01-01 RX ADMIN — ENOXAPARIN SODIUM 30 MG: 30 INJECTION SUBCUTANEOUS at 20:39

## 2021-01-01 RX ADMIN — NIFEDIPINE 60 MG: 60 TABLET, FILM COATED, EXTENDED RELEASE ORAL at 09:18

## 2021-01-01 RX ADMIN — LACTULOSE 20 G: 20 SOLUTION ORAL at 13:36

## 2021-01-01 RX ADMIN — INSULIN LISPRO 4 UNITS: 100 INJECTION, SOLUTION INTRAVENOUS; SUBCUTANEOUS at 17:25

## 2021-01-01 RX ADMIN — CARVEDILOL 12.5 MG: 6.25 TABLET, FILM COATED ORAL at 08:50

## 2021-01-01 RX ADMIN — SODIUM CHLORIDE 6 UNITS/HR: 9 INJECTION, SOLUTION INTRAVENOUS at 17:16

## 2021-01-01 RX ADMIN — DULOXETINE HYDROCHLORIDE 30 MG: 30 CAPSULE, DELAYED RELEASE ORAL at 12:33

## 2021-01-01 RX ADMIN — INSULIN LISPRO 6 UNITS: 100 INJECTION, SOLUTION INTRAVENOUS; SUBCUTANEOUS at 11:54

## 2021-01-01 RX ADMIN — ROPINIROLE HYDROCHLORIDE 4 MG: 1 TABLET, FILM COATED ORAL at 18:12

## 2021-01-01 RX ADMIN — ENOXAPARIN SODIUM 30 MG: 30 INJECTION SUBCUTANEOUS at 22:23

## 2021-01-01 RX ADMIN — CLONIDINE HYDROCHLORIDE 0.3 MG: 0.2 TABLET ORAL at 06:01

## 2021-01-01 RX ADMIN — ORPHENADRINE CITRATE 100 MG: 100 TABLET, EXTENDED RELEASE ORAL at 12:38

## 2021-01-01 RX ADMIN — ATORVASTATIN CALCIUM 10 MG: 10 TABLET, FILM COATED ORAL at 11:17

## 2021-01-01 RX ADMIN — GLUCAGON HYDROCHLORIDE 1 MG: KIT at 01:05

## 2021-01-01 RX ADMIN — INSULIN LISPRO 5 UNITS: 100 INJECTION, SOLUTION INTRAVENOUS; SUBCUTANEOUS at 07:54

## 2021-01-01 RX ADMIN — SODIUM CHLORIDE, PRESERVATIVE FREE 10 ML: 5 INJECTION INTRAVENOUS at 08:31

## 2021-01-01 RX ADMIN — HYDRALAZINE HYDROCHLORIDE 50 MG: 50 TABLET ORAL at 05:31

## 2021-01-01 RX ADMIN — SODIUM CHLORIDE 15 MG/HR: 9 INJECTION, SOLUTION INTRAVENOUS at 21:00

## 2021-01-01 RX ADMIN — HYDRALAZINE HYDROCHLORIDE 50 MG: 50 TABLET ORAL at 13:45

## 2021-01-01 RX ADMIN — LISINOPRIL 10 MG: 10 TABLET ORAL at 08:29

## 2021-01-01 RX ADMIN — LISINOPRIL 20 MG: 20 TABLET ORAL at 08:42

## 2021-01-01 RX ADMIN — SODIUM CHLORIDE 5 MG/HR: 9 INJECTION, SOLUTION INTRAVENOUS at 02:30

## 2021-01-01 RX ADMIN — METOPROLOL TARTRATE 2.5 MG: 5 INJECTION INTRAVENOUS at 05:23

## 2021-01-01 RX ADMIN — METOPROLOL TARTRATE 5 MG: 5 INJECTION INTRAVENOUS at 18:29

## 2021-01-01 RX ADMIN — LEVOTHYROXINE SODIUM 137 MCG: 112 TABLET ORAL at 08:13

## 2021-01-01 RX ADMIN — METOPROLOL TARTRATE 5 MG: 5 INJECTION INTRAVENOUS at 00:53

## 2021-01-01 RX ADMIN — HYDRALAZINE HYDROCHLORIDE 100 MG: 50 TABLET ORAL at 13:52

## 2021-01-01 RX ADMIN — CARVEDILOL 12.5 MG: 6.25 TABLET, FILM COATED ORAL at 08:07

## 2021-01-01 RX ADMIN — ROPINIROLE HYDROCHLORIDE 2 MG: 1 TABLET, FILM COATED ORAL at 20:39

## 2021-01-01 RX ADMIN — NIFEDIPINE 60 MG: 60 TABLET, FILM COATED, EXTENDED RELEASE ORAL at 08:03

## 2021-01-01 RX ADMIN — NIFEDIPINE 30 MG: 30 TABLET, FILM COATED, EXTENDED RELEASE ORAL at 12:24

## 2021-01-01 RX ADMIN — SODIUM CHLORIDE 10 MG/HR: 9 INJECTION, SOLUTION INTRAVENOUS at 07:58

## 2021-01-01 RX ADMIN — ROPINIROLE HYDROCHLORIDE 2 MG: 1 TABLET, FILM COATED ORAL at 20:20

## 2021-01-01 RX ADMIN — CLONIDINE HYDROCHLORIDE 0.3 MG: 0.2 TABLET ORAL at 05:03

## 2021-01-01 RX ADMIN — ATORVASTATIN CALCIUM 20 MG: 10 TABLET, FILM COATED ORAL at 12:38

## 2021-01-01 RX ADMIN — CLONIDINE HYDROCHLORIDE 0.2 MG: 0.2 TABLET ORAL at 06:02

## 2021-01-01 RX ADMIN — LEVOTHYROXINE SODIUM 137 MCG: 112 TABLET ORAL at 11:52

## 2021-01-01 RX ADMIN — SODIUM CHLORIDE 10 MG/HR: 9 INJECTION, SOLUTION INTRAVENOUS at 04:53

## 2021-01-01 RX ADMIN — NIFEDIPINE 60 MG: 60 TABLET, FILM COATED, EXTENDED RELEASE ORAL at 08:13

## 2021-01-01 RX ADMIN — ENOXAPARIN SODIUM 30 MG: 30 INJECTION SUBCUTANEOUS at 20:14

## 2021-01-01 RX ADMIN — CARVEDILOL 12.5 MG: 6.25 TABLET, FILM COATED ORAL at 08:42

## 2021-01-01 RX ADMIN — INSULIN LISPRO 4 UNITS: 100 INJECTION, SOLUTION INTRAVENOUS; SUBCUTANEOUS at 11:31

## 2021-01-01 RX ADMIN — HYDRALAZINE HYDROCHLORIDE 50 MG: 50 TABLET ORAL at 10:45

## 2021-01-01 RX ADMIN — ROPINIROLE HYDROCHLORIDE 4 MG: 1 TABLET, FILM COATED ORAL at 22:24

## 2021-01-01 RX ADMIN — INSULIN LISPRO 2 UNITS: 100 INJECTION, SOLUTION INTRAVENOUS; SUBCUTANEOUS at 06:32

## 2021-01-01 RX ADMIN — LIDOCAINE 1 PATCH: 50 PATCH CUTANEOUS at 20:36

## 2021-01-01 RX ADMIN — CLONIDINE HYDROCHLORIDE 0.2 MG: 0.2 TABLET ORAL at 15:26

## 2021-01-01 RX ADMIN — LORAZEPAM 1 MG: 2 INJECTION INTRAMUSCULAR; INTRAVENOUS at 09:23

## 2021-01-01 RX ADMIN — CLONIDINE HYDROCHLORIDE 0.2 MG: 0.2 TABLET ORAL at 05:40

## 2021-01-01 RX ADMIN — OXYCODONE HYDROCHLORIDE 10 MG: 10 TABLET, FILM COATED, EXTENDED RELEASE ORAL at 20:35

## 2021-01-01 RX ADMIN — INSULIN LISPRO 2 UNITS: 100 INJECTION, SOLUTION INTRAVENOUS; SUBCUTANEOUS at 20:39

## 2021-01-01 RX ADMIN — CLONIDINE HYDROCHLORIDE 0.3 MG: 0.2 TABLET ORAL at 21:31

## 2021-01-01 RX ADMIN — OXYCODONE HYDROCHLORIDE 10 MG: 5 TABLET ORAL at 18:19

## 2021-01-01 RX ADMIN — SODIUM CHLORIDE 5 MG/HR: 9 INJECTION, SOLUTION INTRAVENOUS at 19:03

## 2021-01-01 RX ADMIN — SODIUM CHLORIDE 5 MG/HR: 9 INJECTION, SOLUTION INTRAVENOUS at 13:11

## 2021-01-01 RX ADMIN — INSULIN LISPRO 4 UNITS: 100 INJECTION, SOLUTION INTRAVENOUS; SUBCUTANEOUS at 12:12

## 2021-01-01 RX ADMIN — INSULIN LISPRO 2 UNITS: 100 INJECTION, SOLUTION INTRAVENOUS; SUBCUTANEOUS at 11:24

## 2021-01-01 RX ADMIN — CARVEDILOL 12.5 MG: 6.25 TABLET, FILM COATED ORAL at 17:29

## 2021-01-01 RX ADMIN — CLONIDINE HYDROCHLORIDE 0.3 MG: 0.2 TABLET ORAL at 13:53

## 2021-01-01 RX ADMIN — HYDRALAZINE HYDROCHLORIDE 100 MG: 50 TABLET ORAL at 14:24

## 2021-01-01 RX ADMIN — INSULIN LISPRO 6 UNITS: 100 INJECTION, SOLUTION INTRAVENOUS; SUBCUTANEOUS at 12:53

## 2021-01-01 RX ADMIN — SODIUM CHLORIDE, PRESERVATIVE FREE 10 ML: 5 INJECTION INTRAVENOUS at 23:07

## 2021-01-01 RX ADMIN — NIFEDIPINE 60 MG: 60 TABLET, FILM COATED, EXTENDED RELEASE ORAL at 08:18

## 2021-01-01 RX ADMIN — DEXTROSE MONOHYDRATE 12.5 G: 500 INJECTION PARENTERAL at 23:14

## 2021-01-01 RX ADMIN — METOPROLOL TARTRATE 5 MG: 5 INJECTION INTRAVENOUS at 12:31

## 2021-01-01 RX ADMIN — INSULIN LISPRO 4 UNITS: 100 INJECTION, SOLUTION INTRAVENOUS; SUBCUTANEOUS at 07:55

## 2021-01-01 RX ADMIN — NIFEDIPINE 60 MG: 60 TABLET, FILM COATED, EXTENDED RELEASE ORAL at 08:07

## 2021-01-01 RX ADMIN — HYDRALAZINE HYDROCHLORIDE 50 MG: 50 TABLET ORAL at 15:12

## 2021-01-01 RX ADMIN — NIFEDIPINE 90 MG: 90 TABLET, EXTENDED RELEASE ORAL at 10:16

## 2021-01-01 RX ADMIN — DROPERIDOL 2.5 MG: 2.5 INJECTION, SOLUTION INTRAMUSCULAR; INTRAVENOUS at 00:06

## 2021-01-01 RX ADMIN — LISINOPRIL 10 MG: 10 TABLET ORAL at 08:50

## 2021-01-01 RX ADMIN — CLONIDINE HYDROCHLORIDE 0.1 MG: 0.1 TABLET ORAL at 11:52

## 2021-01-01 RX ADMIN — LISINOPRIL 20 MG: 20 TABLET ORAL at 08:07

## 2021-01-01 RX ADMIN — NICOTINE 1 PATCH: 14 PATCH, EXTENDED RELEASE TRANSDERMAL at 20:40

## 2021-01-01 RX ADMIN — POTASSIUM CHLORIDE 20 MEQ: 14.9 INJECTION, SOLUTION INTRAVENOUS at 18:59

## 2021-01-01 RX ADMIN — OXYCODONE HYDROCHLORIDE 10 MG: 10 TABLET, FILM COATED, EXTENDED RELEASE ORAL at 08:50

## 2021-01-01 RX ADMIN — LISINOPRIL 20 MG: 20 TABLET ORAL at 12:33

## 2021-01-01 RX ADMIN — LISINOPRIL 20 MG: 20 TABLET ORAL at 13:57

## 2021-01-01 RX ADMIN — CARVEDILOL 25 MG: 25 TABLET, FILM COATED ORAL at 16:55

## 2021-01-01 RX ADMIN — LACTULOSE 20 G: 20 SOLUTION ORAL at 15:32

## 2021-01-01 RX ADMIN — SODIUM CHLORIDE, PRESERVATIVE FREE 10 ML: 5 INJECTION INTRAVENOUS at 08:18

## 2021-01-01 RX ADMIN — CLONIDINE HYDROCHLORIDE 0.1 MG: 0.1 TABLET ORAL at 09:18

## 2021-01-01 RX ADMIN — LACTULOSE 20 G: 20 SOLUTION ORAL at 08:29

## 2021-01-01 RX ADMIN — DULOXETINE HYDROCHLORIDE 60 MG: 30 CAPSULE, DELAYED RELEASE ORAL at 08:43

## 2021-01-01 RX ADMIN — INSULIN LISPRO 15 UNITS: 100 INJECTION, SOLUTION INTRAVENOUS; SUBCUTANEOUS at 17:24

## 2021-01-01 RX ADMIN — PREGABALIN 75 MG: 75 CAPSULE ORAL at 21:31

## 2021-01-01 RX ADMIN — LEVOTHYROXINE SODIUM 25 MCG: 25 TABLET ORAL at 06:01

## 2021-01-01 RX ADMIN — OXYCODONE HYDROCHLORIDE 10 MG: 5 TABLET ORAL at 05:15

## 2021-01-01 RX ADMIN — GABAPENTIN 300 MG: 300 CAPSULE ORAL at 08:50

## 2021-01-01 RX ADMIN — NICOTINE 1 PATCH: 14 PATCH, EXTENDED RELEASE TRANSDERMAL at 10:17

## 2021-01-01 RX ADMIN — LACTULOSE 20 G: 20 SOLUTION ORAL at 21:31

## 2021-01-01 RX ADMIN — INSULIN DETEMIR 5 UNITS: 100 INJECTION, SOLUTION SUBCUTANEOUS at 12:34

## 2021-01-01 RX ADMIN — HYDRALAZINE HYDROCHLORIDE 100 MG: 50 TABLET ORAL at 21:47

## 2021-01-01 RX ADMIN — SODIUM CHLORIDE, PRESERVATIVE FREE 10 ML: 5 INJECTION INTRAVENOUS at 20:43

## 2021-01-01 RX ADMIN — LISINOPRIL 20 MG: 20 TABLET ORAL at 08:14

## 2021-01-01 RX ADMIN — INSULIN LISPRO 5 UNITS: 100 INJECTION, SOLUTION INTRAVENOUS; SUBCUTANEOUS at 17:12

## 2021-01-01 RX ADMIN — CLONIDINE HYDROCHLORIDE 0.3 MG: 0.2 TABLET ORAL at 16:03

## 2021-01-01 RX ADMIN — DEXTROSE MONOHYDRATE 12.5 G: 500 INJECTION PARENTERAL at 22:08

## 2021-01-01 RX ADMIN — ACETAMINOPHEN 650 MG: 325 TABLET, FILM COATED ORAL at 12:34

## 2021-01-01 RX ADMIN — CLONIDINE HYDROCHLORIDE 0.2 MG: 0.2 TABLET ORAL at 21:46

## 2021-01-01 RX ADMIN — INSULIN LISPRO 2 UNITS: 100 INJECTION, SOLUTION INTRAVENOUS; SUBCUTANEOUS at 07:59

## 2021-01-01 RX ADMIN — CLONIDINE HYDROCHLORIDE 0.3 MG: 0.2 TABLET ORAL at 21:13

## 2021-01-01 RX ADMIN — CLONIDINE HYDROCHLORIDE 0.3 MG: 0.2 TABLET ORAL at 05:02

## 2021-01-01 RX ADMIN — SODIUM CHLORIDE 7.5 MG/HR: 9 INJECTION, SOLUTION INTRAVENOUS at 08:23

## 2021-01-01 RX ADMIN — LEVOTHYROXINE SODIUM 137 MCG: 112 TABLET ORAL at 08:14

## 2021-01-01 RX ADMIN — SODIUM CHLORIDE 500 ML: 9 INJECTION, SOLUTION INTRAVENOUS at 15:44

## 2021-01-01 RX ADMIN — DEXTROSE MONOHYDRATE 100 ML/HR: 100 INJECTION, SOLUTION INTRAVENOUS at 00:46

## 2021-01-01 RX ADMIN — CARVEDILOL 25 MG: 25 TABLET, FILM COATED ORAL at 11:47

## 2021-01-01 RX ADMIN — CARVEDILOL 12.5 MG: 6.25 TABLET, FILM COATED ORAL at 17:42

## 2021-01-01 RX ADMIN — INSULIN DETEMIR 15 UNITS: 100 INJECTION, SOLUTION SUBCUTANEOUS at 08:01

## 2021-01-01 RX ADMIN — CLONIDINE HYDROCHLORIDE 0.3 MG: 0.2 TABLET ORAL at 05:20

## 2021-01-01 RX ADMIN — SODIUM CHLORIDE, PRESERVATIVE FREE 10 ML: 5 INJECTION INTRAVENOUS at 19:56

## 2021-01-01 RX ADMIN — IPRATROPIUM BROMIDE AND ALBUTEROL SULFATE 3 ML: 2.5; .5 SOLUTION RESPIRATORY (INHALATION) at 06:11

## 2021-01-01 RX ADMIN — INSULIN DETEMIR 15 UNITS: 100 INJECTION, SOLUTION SUBCUTANEOUS at 11:34

## 2021-01-01 RX ADMIN — FAMOTIDINE 20 MG: 20 TABLET, FILM COATED ORAL at 08:42

## 2021-01-01 RX ADMIN — EPOETIN ALFA-EPBX 10000 UNITS: 10000 INJECTION, SOLUTION INTRAVENOUS; SUBCUTANEOUS at 17:58

## 2021-01-01 RX ADMIN — IPRATROPIUM BROMIDE AND ALBUTEROL SULFATE 3 ML: 2.5; .5 SOLUTION RESPIRATORY (INHALATION) at 06:45

## 2021-01-01 RX ADMIN — OXYCODONE HYDROCHLORIDE 10 MG: 5 TABLET ORAL at 05:39

## 2021-01-01 RX ADMIN — SODIUM CHLORIDE 12.5 MG/HR: 9 INJECTION, SOLUTION INTRAVENOUS at 00:53

## 2021-01-01 RX ADMIN — LISINOPRIL 20 MG: 20 TABLET ORAL at 08:18

## 2021-01-01 RX ADMIN — STANDARDIZED SENNA CONCENTRATE 1 TABLET: 8.6 TABLET ORAL at 20:47

## 2021-01-01 RX ADMIN — INSULIN LISPRO 4 UNITS: 100 INJECTION, SOLUTION INTRAVENOUS; SUBCUTANEOUS at 06:32

## 2021-01-01 RX ADMIN — HYDROCODONE BITARTRATE AND ACETAMINOPHEN 1 TABLET: 5; 325 TABLET ORAL at 18:46

## 2021-01-01 RX ADMIN — CARVEDILOL 6.25 MG: 6.25 TABLET, FILM COATED ORAL at 20:47

## 2021-01-01 RX ADMIN — SODIUM CHLORIDE, PRESERVATIVE FREE 10 ML: 5 INJECTION INTRAVENOUS at 20:35

## 2021-01-01 RX ADMIN — ENOXAPARIN SODIUM 30 MG: 30 INJECTION SUBCUTANEOUS at 21:19

## 2021-01-01 RX ADMIN — ORPHENADRINE CITRATE 100 MG: 100 TABLET, EXTENDED RELEASE ORAL at 20:14

## 2021-01-01 RX ADMIN — ENOXAPARIN SODIUM 30 MG: 30 INJECTION SUBCUTANEOUS at 20:29

## 2021-01-01 RX ADMIN — CLONIDINE HYDROCHLORIDE 0.2 MG: 0.2 TABLET ORAL at 14:50

## 2021-01-01 RX ADMIN — LISINOPRIL 20 MG: 20 TABLET ORAL at 20:40

## 2021-01-01 RX ADMIN — DULOXETINE HYDROCHLORIDE 60 MG: 30 CAPSULE, DELAYED RELEASE ORAL at 08:14

## 2021-01-01 RX ADMIN — LORAZEPAM 1 MG: 2 INJECTION INTRAMUSCULAR; INTRAVENOUS at 18:55

## 2021-01-01 RX ADMIN — LISINOPRIL 20 MG: 20 TABLET ORAL at 08:03

## 2021-01-01 RX ADMIN — CARVEDILOL 25 MG: 25 TABLET, FILM COATED ORAL at 07:13

## 2021-01-01 RX ADMIN — ATORVASTATIN CALCIUM 10 MG: 10 TABLET, FILM COATED ORAL at 08:35

## 2021-01-01 RX ADMIN — SODIUM CHLORIDE, PRESERVATIVE FREE 10 ML: 5 INJECTION INTRAVENOUS at 12:33

## 2021-01-01 RX ADMIN — LORAZEPAM 1 MG: 2 INJECTION INTRAMUSCULAR at 09:23

## 2021-01-01 RX ADMIN — GABAPENTIN 300 MG: 300 CAPSULE ORAL at 16:39

## 2021-01-01 RX ADMIN — ORPHENADRINE CITRATE 100 MG: 100 TABLET, EXTENDED RELEASE ORAL at 20:35

## 2021-01-01 RX ADMIN — METOPROLOL TARTRATE 5 MG: 5 INJECTION INTRAVENOUS at 12:01

## 2021-01-01 RX ADMIN — CARVEDILOL 6.25 MG: 6.25 TABLET, FILM COATED ORAL at 12:24

## 2021-01-01 RX ADMIN — OXYCODONE HYDROCHLORIDE 10 MG: 5 TABLET ORAL at 03:25

## 2021-01-01 RX ADMIN — NIFEDIPINE 60 MG: 60 TABLET, FILM COATED, EXTENDED RELEASE ORAL at 22:31

## 2021-01-01 RX ADMIN — ENOXAPARIN SODIUM 30 MG: 30 INJECTION SUBCUTANEOUS at 21:13

## 2021-01-01 RX ADMIN — OXYCODONE HYDROCHLORIDE 10 MG: 5 TABLET ORAL at 13:55

## 2021-01-01 RX ADMIN — CLONIDINE HYDROCHLORIDE 0.2 MG: 0.2 TABLET ORAL at 21:50

## 2021-01-01 RX ADMIN — LIDOCAINE 1 PATCH: 50 PATCH CUTANEOUS at 20:46

## 2021-01-01 RX ADMIN — ORPHENADRINE CITRATE 100 MG: 100 TABLET, EXTENDED RELEASE ORAL at 08:06

## 2021-01-01 RX ADMIN — STANDARDIZED SENNA CONCENTRATE 1 TABLET: 8.6 TABLET ORAL at 21:31

## 2021-01-01 RX ADMIN — SODIUM CHLORIDE 2.5 MG/HR: 9 INJECTION, SOLUTION INTRAVENOUS at 13:45

## 2021-01-01 RX ADMIN — SODIUM BICARBONATE 50 MEQ: 84 INJECTION INTRAVENOUS at 15:41

## 2021-01-01 RX ADMIN — CLONIDINE HYDROCHLORIDE 0.2 MG: 0.2 TABLET ORAL at 17:21

## 2021-01-01 RX ADMIN — CARVEDILOL 6.25 MG: 6.25 TABLET, FILM COATED ORAL at 09:31

## 2021-01-01 RX ADMIN — STANDARDIZED SENNA CONCENTRATE 1 TABLET: 8.6 TABLET ORAL at 20:14

## 2021-01-01 RX ADMIN — IPRATROPIUM BROMIDE AND ALBUTEROL SULFATE 3 ML: 2.5; .5 SOLUTION RESPIRATORY (INHALATION) at 19:30

## 2021-01-01 RX ADMIN — LISINOPRIL 20 MG: 20 TABLET ORAL at 22:31

## 2021-01-01 RX ADMIN — OXYCODONE HYDROCHLORIDE 10 MG: 5 TABLET ORAL at 01:09

## 2021-01-01 RX ADMIN — IPRATROPIUM BROMIDE AND ALBUTEROL SULFATE 3 ML: 2.5; .5 SOLUTION RESPIRATORY (INHALATION) at 13:53

## 2021-01-01 RX ADMIN — HYDROMORPHONE HYDROCHLORIDE 1 MG: 1 INJECTION, SOLUTION INTRAMUSCULAR; INTRAVENOUS; SUBCUTANEOUS at 15:19

## 2021-01-01 RX ADMIN — METOPROLOL TARTRATE 5 MG: 5 INJECTION INTRAVENOUS at 05:28

## 2021-01-01 RX ADMIN — OXYCODONE HYDROCHLORIDE 10 MG: 5 TABLET ORAL at 19:27

## 2021-01-01 RX ADMIN — SODIUM CHLORIDE 7 UNITS/HR: 9 INJECTION, SOLUTION INTRAVENOUS at 01:45

## 2021-01-01 RX ADMIN — OXYCODONE HYDROCHLORIDE 5 MG: 5 TABLET ORAL at 01:19

## 2021-01-01 RX ADMIN — SODIUM CHLORIDE 7.5 MG/HR: 9 INJECTION, SOLUTION INTRAVENOUS at 05:55

## 2021-01-01 RX ADMIN — ATENOLOL 50 MG: 50 TABLET ORAL at 20:20

## 2021-01-01 RX ADMIN — ROPINIROLE HYDROCHLORIDE 2 MG: 1 TABLET, FILM COATED ORAL at 21:19

## 2021-01-01 RX ADMIN — ORPHENADRINE CITRATE 100 MG: 100 TABLET, EXTENDED RELEASE ORAL at 08:50

## 2021-01-01 RX ADMIN — NIFEDIPINE 60 MG: 60 TABLET, FILM COATED, EXTENDED RELEASE ORAL at 13:47

## 2021-01-01 RX ADMIN — CLONIDINE HYDROCHLORIDE 0.2 MG: 0.2 TABLET ORAL at 14:24

## 2021-01-01 RX ADMIN — INSULIN LISPRO 4 UNITS: 100 INJECTION, SOLUTION INTRAVENOUS; SUBCUTANEOUS at 16:36

## 2021-01-01 RX ADMIN — DEXTROSE MONOHYDRATE 50 ML/HR: 50 INJECTION, SOLUTION INTRAVENOUS at 00:53

## 2021-01-01 RX ADMIN — DEXTROSE MONOHYDRATE 12.5 G: 500 INJECTION PARENTERAL at 02:58

## 2021-01-01 RX ADMIN — INSULIN LISPRO 4 UNITS: 100 INJECTION, SOLUTION INTRAVENOUS; SUBCUTANEOUS at 11:51

## 2021-01-01 RX ADMIN — ACETAMINOPHEN 650 MG: 325 TABLET, FILM COATED ORAL at 06:36

## 2021-01-01 RX ADMIN — SODIUM CHLORIDE, PRESERVATIVE FREE 3 ML: 5 INJECTION INTRAVENOUS at 11:50

## 2021-01-01 RX ADMIN — IPRATROPIUM BROMIDE AND ALBUTEROL SULFATE 3 ML: 2.5; .5 SOLUTION RESPIRATORY (INHALATION) at 12:10

## 2021-01-01 RX ADMIN — CLONIDINE HYDROCHLORIDE 0.2 MG: 0.2 TABLET ORAL at 23:21

## 2021-01-01 RX ADMIN — INSULIN LISPRO 5 UNITS: 100 INJECTION, SOLUTION INTRAVENOUS; SUBCUTANEOUS at 11:34

## 2021-01-01 RX ADMIN — HYDRALAZINE HYDROCHLORIDE 100 MG: 50 TABLET ORAL at 05:15

## 2021-01-01 RX ADMIN — SPIRONOLACTONE 25 MG: 25 TABLET ORAL at 17:58

## 2021-01-01 RX ADMIN — CARVEDILOL 12.5 MG: 6.25 TABLET, FILM COATED ORAL at 12:37

## 2021-01-01 RX ADMIN — SODIUM CHLORIDE, PRESERVATIVE FREE 10 ML: 5 INJECTION INTRAVENOUS at 20:26

## 2021-01-01 RX ADMIN — INSULIN LISPRO 4 UNITS: 100 INJECTION, SOLUTION INTRAVENOUS; SUBCUTANEOUS at 08:12

## 2021-01-01 RX ADMIN — CLONIDINE HYDROCHLORIDE 0.2 MG: 0.2 TABLET ORAL at 06:03

## 2021-01-01 RX ADMIN — INSULIN LISPRO 2 UNITS: 100 INJECTION, SOLUTION INTRAVENOUS; SUBCUTANEOUS at 20:29

## 2021-01-01 RX ADMIN — INSULIN DETEMIR 15 UNITS: 100 INJECTION, SOLUTION SUBCUTANEOUS at 08:28

## 2021-01-01 RX ADMIN — HYDRALAZINE HYDROCHLORIDE 25 MG: 25 TABLET, FILM COATED ORAL at 15:39

## 2021-01-01 RX ADMIN — ATENOLOL 50 MG: 50 TABLET ORAL at 08:15

## 2021-01-01 RX ADMIN — POTASSIUM CHLORIDE AND SODIUM CHLORIDE 250 ML/HR: 900; 150 INJECTION, SOLUTION INTRAVENOUS at 03:25

## 2021-01-01 RX ADMIN — ONDANSETRON 4 MG: 2 INJECTION INTRAMUSCULAR; INTRAVENOUS at 05:27

## 2021-01-01 RX ADMIN — ENOXAPARIN SODIUM 30 MG: 30 INJECTION SUBCUTANEOUS at 20:36

## 2021-01-01 RX ADMIN — HYDRALAZINE HYDROCHLORIDE 50 MG: 50 TABLET ORAL at 21:32

## 2021-01-01 RX ADMIN — NIFEDIPINE 60 MG: 60 TABLET, FILM COATED, EXTENDED RELEASE ORAL at 08:42

## 2021-01-01 RX ADMIN — CLONIDINE HYDROCHLORIDE 0.3 MG: 0.2 TABLET ORAL at 12:49

## 2021-01-01 RX ADMIN — INSULIN LISPRO 6 UNITS: 100 INJECTION, SOLUTION INTRAVENOUS; SUBCUTANEOUS at 06:12

## 2021-01-01 RX ADMIN — ACETAMINOPHEN 650 MG: 325 TABLET ORAL at 17:20

## 2021-01-01 RX ADMIN — INSULIN LISPRO 4 UNITS: 100 INJECTION, SOLUTION INTRAVENOUS; SUBCUTANEOUS at 11:33

## 2021-01-01 RX ADMIN — IPRATROPIUM BROMIDE AND ALBUTEROL SULFATE 3 ML: 2.5; .5 SOLUTION RESPIRATORY (INHALATION) at 00:03

## 2021-01-01 RX ADMIN — HYDRALAZINE HYDROCHLORIDE 75 MG: 50 TABLET ORAL at 16:03

## 2021-01-01 RX ADMIN — HYDRALAZINE HYDROCHLORIDE 100 MG: 50 TABLET ORAL at 13:26

## 2021-01-01 RX ADMIN — LIDOCAINE 1 PATCH: 50 PATCH CUTANEOUS at 21:20

## 2021-01-01 RX ADMIN — GABAPENTIN 300 MG: 300 CAPSULE ORAL at 21:34

## 2021-01-01 RX ADMIN — OXYCODONE HYDROCHLORIDE 5 MG: 5 TABLET ORAL at 06:40

## 2021-01-01 RX ADMIN — HYDRALAZINE HYDROCHLORIDE 100 MG: 50 TABLET ORAL at 05:57

## 2021-01-01 RX ADMIN — OXYCODONE HYDROCHLORIDE 10 MG: 5 TABLET ORAL at 23:31

## 2021-01-01 RX ADMIN — HYDRALAZINE HYDROCHLORIDE 100 MG: 50 TABLET ORAL at 22:04

## 2021-01-01 RX ADMIN — SODIUM CHLORIDE 7.5 MG/HR: 9 INJECTION, SOLUTION INTRAVENOUS at 00:00

## 2021-01-01 RX ADMIN — INSULIN LISPRO 2 UNITS: 100 INJECTION, SOLUTION INTRAVENOUS; SUBCUTANEOUS at 11:43

## 2021-01-01 RX ADMIN — ORPHENADRINE CITRATE 60 MG: 60 INJECTION INTRAMUSCULAR; INTRAVENOUS at 20:47

## 2021-01-01 RX ADMIN — ISOSORBIDE MONONITRATE 60 MG: 60 TABLET, EXTENDED RELEASE ORAL at 08:07

## 2021-01-01 RX ADMIN — LEVOTHYROXINE SODIUM 137 MCG: 112 TABLET ORAL at 08:07

## 2021-01-01 RX ADMIN — INSULIN LISPRO 2 UNITS: 100 INJECTION, SOLUTION INTRAVENOUS; SUBCUTANEOUS at 17:21

## 2021-01-01 RX ADMIN — POTASSIUM CHLORIDE 20 MEQ: 14.9 INJECTION, SOLUTION INTRAVENOUS at 16:24

## 2021-01-01 RX ADMIN — LEVOTHYROXINE SODIUM 137 MCG: 112 TABLET ORAL at 08:50

## 2021-01-01 RX ADMIN — ENOXAPARIN SODIUM 30 MG: 30 INJECTION SUBCUTANEOUS at 20:06

## 2021-01-01 RX ADMIN — OXYCODONE HYDROCHLORIDE 5 MG: 5 TABLET ORAL at 15:50

## 2021-01-01 RX ADMIN — CARVEDILOL 12.5 MG: 6.25 TABLET, FILM COATED ORAL at 17:20

## 2021-01-01 RX ADMIN — HYDRALAZINE HYDROCHLORIDE 50 MG: 50 TABLET ORAL at 12:33

## 2021-01-01 RX ADMIN — PREGABALIN 75 MG: 75 CAPSULE ORAL at 22:31

## 2021-01-01 RX ADMIN — SODIUM CHLORIDE, PRESERVATIVE FREE 10 ML: 5 INJECTION INTRAVENOUS at 08:04

## 2021-01-01 RX ADMIN — LACTULOSE 20 G: 20 SOLUTION ORAL at 08:07

## 2021-01-01 RX ADMIN — ATORVASTATIN CALCIUM 10 MG: 10 TABLET, FILM COATED ORAL at 08:43

## 2021-01-01 RX ADMIN — DULOXETINE HYDROCHLORIDE 60 MG: 30 CAPSULE, DELAYED RELEASE ORAL at 11:52

## 2021-01-01 RX ADMIN — LACTULOSE 20 G: 20 SOLUTION ORAL at 20:15

## 2021-01-01 RX ADMIN — SODIUM CHLORIDE 5 MG/HR: 9 INJECTION, SOLUTION INTRAVENOUS at 13:34

## 2021-01-01 RX ADMIN — ISOSORBIDE MONONITRATE 60 MG: 60 TABLET, EXTENDED RELEASE ORAL at 08:19

## 2021-01-01 RX ADMIN — HYDRALAZINE HYDROCHLORIDE 50 MG: 50 TABLET ORAL at 16:17

## 2021-01-01 RX ADMIN — HYDRALAZINE HYDROCHLORIDE 50 MG: 50 TABLET ORAL at 13:57

## 2021-01-01 RX ADMIN — CLONIDINE HYDROCHLORIDE 0.2 MG: 0.2 TABLET ORAL at 22:24

## 2021-01-01 RX ADMIN — HYDRALAZINE HYDROCHLORIDE 50 MG: 50 TABLET ORAL at 21:59

## 2021-01-01 RX ADMIN — OXYCODONE HYDROCHLORIDE 10 MG: 5 TABLET ORAL at 01:38

## 2021-01-01 RX ADMIN — OXYCODONE HYDROCHLORIDE 10 MG: 5 TABLET ORAL at 05:40

## 2021-01-01 RX ADMIN — HYDRALAZINE HYDROCHLORIDE 50 MG: 50 TABLET ORAL at 08:18

## 2021-01-01 RX ADMIN — SODIUM CHLORIDE, PRESERVATIVE FREE 10 ML: 5 INJECTION INTRAVENOUS at 08:20

## 2021-01-01 RX ADMIN — LIDOCAINE 1 PATCH: 50 PATCH CUTANEOUS at 21:22

## 2021-01-01 RX ADMIN — LISINOPRIL 20 MG: 20 TABLET ORAL at 20:23

## 2021-01-01 RX ADMIN — IPRATROPIUM BROMIDE AND ALBUTEROL SULFATE 3 ML: 2.5; .5 SOLUTION RESPIRATORY (INHALATION) at 09:22

## 2021-01-01 RX ADMIN — LISINOPRIL 40 MG: 20 TABLET ORAL at 13:48

## 2021-01-01 RX ADMIN — DULOXETINE HYDROCHLORIDE 60 MG: 30 CAPSULE, DELAYED RELEASE ORAL at 08:42

## 2021-01-01 RX ADMIN — STANDARDIZED SENNA CONCENTRATE 1 TABLET: 8.6 TABLET ORAL at 20:26

## 2021-01-01 RX ADMIN — HYDRALAZINE HYDROCHLORIDE 50 MG: 50 TABLET ORAL at 21:11

## 2021-01-01 RX ADMIN — POTASSIUM CHLORIDE 20 MEQ: 14.9 INJECTION, SOLUTION INTRAVENOUS at 21:36

## 2021-01-01 RX ADMIN — SODIUM CHLORIDE, PRESERVATIVE FREE 10 ML: 5 INJECTION INTRAVENOUS at 08:13

## 2021-01-01 RX ADMIN — POTASSIUM CHLORIDE 20 MEQ: 14.9 INJECTION, SOLUTION INTRAVENOUS at 02:15

## 2021-01-01 RX ADMIN — SODIUM CHLORIDE 15 MG/HR: 9 INJECTION, SOLUTION INTRAVENOUS at 22:30

## 2021-01-01 RX ADMIN — SODIUM CHLORIDE, PRESERVATIVE FREE 10 ML: 5 INJECTION INTRAVENOUS at 11:50

## 2021-01-01 RX ADMIN — INSULIN DETEMIR 15 UNITS: 100 INJECTION, SOLUTION SUBCUTANEOUS at 08:13

## 2021-01-01 RX ADMIN — INSULIN DETEMIR 10 UNITS: 100 INJECTION, SOLUTION SUBCUTANEOUS at 21:10

## 2021-01-01 RX ADMIN — HYDROMORPHONE HYDROCHLORIDE 0.5 MG: 1 INJECTION, SOLUTION INTRAMUSCULAR; INTRAVENOUS; SUBCUTANEOUS at 16:22

## 2021-01-01 RX ADMIN — INSULIN LISPRO 5 UNITS: 100 INJECTION, SOLUTION INTRAVENOUS; SUBCUTANEOUS at 08:10

## 2021-01-01 RX ADMIN — HYDRALAZINE HYDROCHLORIDE 100 MG: 50 TABLET ORAL at 05:39

## 2021-01-01 RX ADMIN — HYDRALAZINE HYDROCHLORIDE 50 MG: 50 TABLET ORAL at 15:32

## 2021-01-01 RX ADMIN — PREGABALIN 75 MG: 75 CAPSULE ORAL at 13:52

## 2021-01-01 RX ADMIN — ATORVASTATIN CALCIUM 10 MG: 10 TABLET, FILM COATED ORAL at 08:14

## 2021-01-01 RX ADMIN — ENOXAPARIN SODIUM 30 MG: 30 INJECTION SUBCUTANEOUS at 20:41

## 2021-01-01 RX ADMIN — OXYCODONE HYDROCHLORIDE 10 MG: 10 TABLET, FILM COATED, EXTENDED RELEASE ORAL at 08:28

## 2021-01-01 RX ADMIN — LISINOPRIL 20 MG: 20 TABLET ORAL at 17:20

## 2021-01-01 RX ADMIN — CARVEDILOL 25 MG: 25 TABLET, FILM COATED ORAL at 08:07

## 2021-01-01 RX ADMIN — NIFEDIPINE 90 MG: 90 TABLET, EXTENDED RELEASE ORAL at 08:50

## 2021-01-01 RX ADMIN — IPRATROPIUM BROMIDE AND ALBUTEROL SULFATE 3 ML: 2.5; .5 SOLUTION RESPIRATORY (INHALATION) at 19:20

## 2021-01-01 RX ADMIN — SODIUM CHLORIDE, PRESERVATIVE FREE 3 ML: 5 INJECTION INTRAVENOUS at 20:16

## 2021-01-01 RX ADMIN — LIDOCAINE 1 PATCH: 50 PATCH CUTANEOUS at 01:20

## 2021-01-01 RX ADMIN — STANDARDIZED SENNA CONCENTRATE 1 TABLET: 8.6 TABLET ORAL at 21:19

## 2021-01-01 RX ADMIN — INSULIN DETEMIR 10 UNITS: 100 INJECTION, SOLUTION SUBCUTANEOUS at 22:22

## 2021-01-01 RX ADMIN — ONDANSETRON 4 MG: 2 INJECTION INTRAMUSCULAR; INTRAVENOUS at 05:19

## 2021-01-01 RX ADMIN — INSULIN DETEMIR 10 UNITS: 100 INJECTION, SOLUTION SUBCUTANEOUS at 21:34

## 2021-01-01 RX ADMIN — LIDOCAINE 1 PATCH: 50 PATCH CUTANEOUS at 20:28

## 2021-01-01 RX ADMIN — SODIUM CHLORIDE 10 MG/HR: 9 INJECTION, SOLUTION INTRAVENOUS at 07:34

## 2021-01-01 RX ADMIN — LABETALOL HYDROCHLORIDE 20 MG: 5 INJECTION, SOLUTION INTRAVENOUS at 14:04

## 2021-01-01 RX ADMIN — LEVOTHYROXINE SODIUM 25 MCG: 25 TABLET ORAL at 06:12

## 2021-01-01 RX ADMIN — HYDRALAZINE HYDROCHLORIDE 50 MG: 50 TABLET ORAL at 20:27

## 2021-01-01 RX ADMIN — SODIUM CHLORIDE 500 ML: 9 INJECTION, SOLUTION INTRAVENOUS at 15:32

## 2021-01-01 RX ADMIN — CARVEDILOL 12.5 MG: 6.25 TABLET, FILM COATED ORAL at 08:14

## 2021-01-01 RX ADMIN — INSULIN LISPRO 2 UNITS: 100 INJECTION, SOLUTION INTRAVENOUS; SUBCUTANEOUS at 08:12

## 2021-01-01 RX ADMIN — SODIUM CHLORIDE 5 MG/HR: 9 INJECTION, SOLUTION INTRAVENOUS at 23:08

## 2021-01-01 RX ADMIN — ACETAMINOPHEN 650 MG: 325 TABLET, FILM COATED ORAL at 10:52

## 2021-01-01 RX ADMIN — SODIUM CHLORIDE, PRESERVATIVE FREE 10 ML: 5 INJECTION INTRAVENOUS at 08:32

## 2021-01-01 RX ADMIN — SODIUM CHLORIDE, PRESERVATIVE FREE 10 ML: 5 INJECTION INTRAVENOUS at 08:35

## 2021-01-01 RX ADMIN — SODIUM CHLORIDE, PRESERVATIVE FREE 10 ML: 5 INJECTION INTRAVENOUS at 08:02

## 2021-01-01 RX ADMIN — CLONIDINE HYDROCHLORIDE 0.2 MG: 0.2 TABLET ORAL at 05:27

## 2021-01-01 RX ADMIN — METOPROLOL TARTRATE 2.5 MG: 5 INJECTION INTRAVENOUS at 00:55

## 2021-01-01 RX ADMIN — SODIUM CHLORIDE 5 MG/HR: 9 INJECTION, SOLUTION INTRAVENOUS at 17:58

## 2021-01-01 RX ADMIN — EPOETIN ALFA-EPBX 5000 UNITS: 3000 INJECTION, SOLUTION INTRAVENOUS; SUBCUTANEOUS at 10:47

## 2021-01-01 RX ADMIN — ISOSORBIDE MONONITRATE 60 MG: 60 TABLET, EXTENDED RELEASE ORAL at 08:04

## 2021-01-01 RX ADMIN — INSULIN DETEMIR 5 UNITS: 100 INJECTION, SOLUTION SUBCUTANEOUS at 20:07

## 2021-01-01 RX ADMIN — NIFEDIPINE 90 MG: 90 TABLET, EXTENDED RELEASE ORAL at 08:30

## 2021-01-01 RX ADMIN — HYDRALAZINE HYDROCHLORIDE 50 MG: 50 TABLET ORAL at 13:00

## 2021-01-01 RX ADMIN — ACETAMINOPHEN 650 MG: 325 TABLET, FILM COATED ORAL at 12:47

## 2021-01-01 RX ADMIN — PREGABALIN 75 MG: 75 CAPSULE ORAL at 08:17

## 2021-01-01 RX ADMIN — INSULIN LISPRO 5 UNITS: 100 INJECTION, SOLUTION INTRAVENOUS; SUBCUTANEOUS at 07:39

## 2021-01-01 RX ADMIN — FAMOTIDINE 40 MG: 10 INJECTION INTRAVENOUS at 04:59

## 2021-01-01 RX ADMIN — HYDRALAZINE HYDROCHLORIDE 50 MG: 50 TABLET ORAL at 15:03

## 2021-01-01 RX ADMIN — ORPHENADRINE CITRATE 60 MG: 60 INJECTION INTRAMUSCULAR; INTRAVENOUS at 19:55

## 2021-01-01 RX ADMIN — INSULIN LISPRO 2 UNITS: 100 INJECTION, SOLUTION INTRAVENOUS; SUBCUTANEOUS at 12:39

## 2021-01-01 RX ADMIN — SODIUM CHLORIDE 5 MG/HR: 9 INJECTION, SOLUTION INTRAVENOUS at 08:42

## 2021-01-01 RX ADMIN — CARVEDILOL 25 MG: 25 TABLET, FILM COATED ORAL at 17:37

## 2021-01-01 RX ADMIN — PROCHLORPERAZINE EDISYLATE 10 MG: 5 INJECTION INTRAMUSCULAR; INTRAVENOUS at 05:19

## 2021-01-01 RX ADMIN — LABETALOL HYDROCHLORIDE 20 MG: 5 INJECTION, SOLUTION INTRAVENOUS at 02:17

## 2021-01-01 RX ADMIN — LACTULOSE 20 G: 20 SOLUTION ORAL at 20:40

## 2021-01-01 RX ADMIN — DEXTROSE MONOHYDRATE 100 ML/HR: 100 INJECTION, SOLUTION INTRAVENOUS at 10:17

## 2021-01-01 RX ADMIN — HYDROCODONE BITARTRATE AND ACETAMINOPHEN 1 TABLET: 5; 325 TABLET ORAL at 10:46

## 2021-01-01 RX ADMIN — SODIUM CHLORIDE, PRESERVATIVE FREE 10 ML: 5 INJECTION INTRAVENOUS at 08:06

## 2021-01-01 RX ADMIN — ACETAMINOPHEN 650 MG: 325 TABLET ORAL at 14:46

## 2021-01-01 RX ADMIN — HYDRALAZINE HYDROCHLORIDE 100 MG: 50 TABLET ORAL at 06:02

## 2021-01-01 RX ADMIN — POTASSIUM CHLORIDE AND SODIUM CHLORIDE 250 ML/HR: 900; 150 INJECTION, SOLUTION INTRAVENOUS at 12:53

## 2021-01-01 RX ADMIN — DROPERIDOL 1.25 MG: 2.5 INJECTION, SOLUTION INTRAMUSCULAR; INTRAVENOUS at 03:25

## 2021-01-01 RX ADMIN — SODIUM CHLORIDE 5 MG/HR: 9 INJECTION, SOLUTION INTRAVENOUS at 04:20

## 2021-01-01 RX ADMIN — NIFEDIPINE 90 MG: 90 TABLET, EXTENDED RELEASE ORAL at 08:06

## 2021-01-01 RX ADMIN — SODIUM CHLORIDE 5 MG/HR: 9 INJECTION, SOLUTION INTRAVENOUS at 05:09

## 2021-01-01 RX ADMIN — ATENOLOL 50 MG: 50 TABLET ORAL at 08:02

## 2021-01-01 RX ADMIN — LEVOTHYROXINE SODIUM 50 MCG: 50 TABLET ORAL at 05:02

## 2021-01-01 RX ADMIN — INSULIN LISPRO 2 UNITS: 100 INJECTION, SOLUTION INTRAVENOUS; SUBCUTANEOUS at 18:29

## 2021-01-01 RX ADMIN — ATORVASTATIN CALCIUM 10 MG: 10 TABLET, FILM COATED ORAL at 10:16

## 2021-01-01 RX ADMIN — CLONIDINE HYDROCHLORIDE 0.2 MG: 0.2 TABLET ORAL at 03:32

## 2021-01-01 RX ADMIN — CLONIDINE HYDROCHLORIDE 0.2 MG: 0.2 TABLET ORAL at 15:50

## 2021-01-01 RX ADMIN — INSULIN LISPRO 7 UNITS: 100 INJECTION, SOLUTION INTRAVENOUS; SUBCUTANEOUS at 07:12

## 2021-01-01 RX ADMIN — CARVEDILOL 12.5 MG: 6.25 TABLET, FILM COATED ORAL at 21:32

## 2021-01-01 RX ADMIN — SODIUM CHLORIDE, PRESERVATIVE FREE 3 ML: 5 INJECTION INTRAVENOUS at 20:41

## 2021-01-01 RX ADMIN — CLONIDINE HYDROCHLORIDE 0.2 MG: 0.2 TABLET ORAL at 12:24

## 2021-01-01 RX ADMIN — GABAPENTIN 300 MG: 300 CAPSULE ORAL at 20:35

## 2021-01-01 RX ADMIN — SODIUM CHLORIDE, PRESERVATIVE FREE 10 ML: 5 INJECTION INTRAVENOUS at 21:13

## 2021-01-01 RX ADMIN — CARVEDILOL 12.5 MG: 6.25 TABLET, FILM COATED ORAL at 05:45

## 2021-01-01 RX ADMIN — INSULIN LISPRO 2 UNITS: 100 INJECTION, SOLUTION INTRAVENOUS; SUBCUTANEOUS at 11:45

## 2021-01-01 RX ADMIN — SODIUM CHLORIDE, PRESERVATIVE FREE 3 ML: 5 INJECTION INTRAVENOUS at 20:05

## 2021-01-01 RX ADMIN — DULOXETINE HYDROCHLORIDE 60 MG: 30 CAPSULE, DELAYED RELEASE ORAL at 08:02

## 2021-01-01 RX ADMIN — INSULIN LISPRO 4 UNITS: 100 INJECTION, SOLUTION INTRAVENOUS; SUBCUTANEOUS at 11:20

## 2021-01-01 RX ADMIN — ENOXAPARIN SODIUM 30 MG: 30 INJECTION SUBCUTANEOUS at 20:35

## 2021-01-01 RX ADMIN — SODIUM CHLORIDE, PRESERVATIVE FREE 10 ML: 5 INJECTION INTRAVENOUS at 08:42

## 2021-01-01 RX ADMIN — INSULIN LISPRO 4 UNITS: 100 INJECTION, SOLUTION INTRAVENOUS; SUBCUTANEOUS at 16:49

## 2021-01-01 RX ADMIN — LACTULOSE 20 G: 20 SOLUTION ORAL at 17:19

## 2021-01-01 RX ADMIN — HYDRALAZINE HYDROCHLORIDE 75 MG: 50 TABLET ORAL at 05:20

## 2021-01-01 RX ADMIN — FAMOTIDINE 20 MG: 20 TABLET, FILM COATED ORAL at 10:16

## 2021-01-01 RX ADMIN — OXYCODONE HYDROCHLORIDE 10 MG: 10 TABLET, FILM COATED, EXTENDED RELEASE ORAL at 20:14

## 2021-01-01 RX ADMIN — NICOTINE 1 PATCH: 14 PATCH, EXTENDED RELEASE TRANSDERMAL at 08:13

## 2021-01-03 ENCOUNTER — APPOINTMENT (OUTPATIENT)
Dept: CT IMAGING | Age: 51
End: 2021-01-03
Payer: MEDICARE

## 2021-01-03 ENCOUNTER — APPOINTMENT (OUTPATIENT)
Dept: GENERAL RADIOLOGY | Age: 51
End: 2021-01-03
Payer: MEDICARE

## 2021-01-03 ENCOUNTER — HOSPITAL ENCOUNTER (EMERGENCY)
Age: 51
Discharge: HOME | End: 2021-01-03
Attending: EMERGENCY MEDICINE
Payer: MEDICARE

## 2021-01-03 VITALS
OXYGEN SATURATION: 96 % | WEIGHT: 140 LBS | BODY MASS INDEX: 22.5 KG/M2 | HEIGHT: 66 IN | SYSTOLIC BLOOD PRESSURE: 140 MMHG | TEMPERATURE: 99 F | HEART RATE: 69 BPM | DIASTOLIC BLOOD PRESSURE: 76 MMHG | RESPIRATION RATE: 15 BRPM

## 2021-01-03 DIAGNOSIS — M25.552 LEFT HIP PAIN: ICD-10-CM

## 2021-01-03 DIAGNOSIS — R73.9 HYPERGLYCEMIA: ICD-10-CM

## 2021-01-03 DIAGNOSIS — M54.50 LUMBAR BACK PAIN: Primary | ICD-10-CM
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
## 2021-01-03 LAB
ALBUMIN SERPL-MCNC: 3.8 G/DL (ref 3.5–5.2)
ALP BLD-CCNC: 119 U/L (ref 35–104)
ALT SERPL-CCNC: 11 U/L (ref 5–33)
ANION GAP SERPL CALCULATED.3IONS-SCNC: 15 MMOL/L (ref 7–19)
AST SERPL-CCNC: 19 U/L (ref 5–32)
BASOPHILS ABSOLUTE: 0.1 K/UL (ref 0–0.2)
BASOPHILS RELATIVE PERCENT: 1.2 % (ref 0–1)
BILIRUB SERPL-MCNC: 0.3 MG/DL (ref 0.2–1.2)
BUN BLDV-MCNC: 21 MG/DL (ref 6–20)
CALCIUM SERPL-MCNC: 9.7 MG/DL (ref 8.6–10)
CHLORIDE BLD-SCNC: 86 MMOL/L (ref 98–111)
CO2: 29 MMOL/L (ref 22–29)
CREAT SERPL-MCNC: 2.5 MG/DL (ref 0.5–0.9)
EOSINOPHILS ABSOLUTE: 0.2 K/UL (ref 0–0.6)
EOSINOPHILS RELATIVE PERCENT: 2.6 % (ref 0–5)
GFR AFRICAN AMERICAN: 24
GFR NON-AFRICAN AMERICAN: 20
GLUCOSE BLD-MCNC: 432 MG/DL (ref 74–109)
HCT VFR BLD CALC: 38.8 % (ref 37–47)
HEMOGLOBIN: 12.8 G/DL (ref 12–16)
IMMATURE GRANULOCYTES #: 0 K/UL
LYMPHOCYTES ABSOLUTE: 0.7 K/UL (ref 1.1–4.5)
LYMPHOCYTES RELATIVE PERCENT: 12.4 % (ref 20–40)
MCH RBC QN AUTO: 31 PG (ref 27–31)
MCHC RBC AUTO-ENTMCNC: 33 G/DL (ref 33–37)
MCV RBC AUTO: 93.9 FL (ref 81–99)
MONOCYTES ABSOLUTE: 0.5 K/UL (ref 0–0.9)
MONOCYTES RELATIVE PERCENT: 8.2 % (ref 0–10)
NEUTROPHILS ABSOLUTE: 4.4 K/UL (ref 1.5–7.5)
NEUTROPHILS RELATIVE PERCENT: 75.4 % (ref 50–65)
PDW BLD-RTO: 12.4 % (ref 11.5–14.5)
PLATELET # BLD: 164 K/UL (ref 130–400)
PMV BLD AUTO: 9.4 FL (ref 9.4–12.3)
POTASSIUM SERPL-SCNC: 3.7 MMOL/L (ref 3.5–5)
RBC # BLD: 4.13 M/UL (ref 4.2–5.4)
SODIUM BLD-SCNC: 130 MMOL/L (ref 136–145)
TOTAL PROTEIN: 8.1 G/DL (ref 6.6–8.7)
WBC # BLD: 5.9 K/UL (ref 4.8–10.8)

## 2021-01-03 PROCEDURE — 72131 CT LUMBAR SPINE W/O DYE: CPT

## 2021-01-03 PROCEDURE — 96376 TX/PRO/DX INJ SAME DRUG ADON: CPT

## 2021-01-03 PROCEDURE — 99283 EMERGENCY DEPT VISIT LOW MDM: CPT

## 2021-01-03 PROCEDURE — 36415 COLL VENOUS BLD VENIPUNCTURE: CPT

## 2021-01-03 PROCEDURE — 73552 X-RAY EXAM OF FEMUR 2/>: CPT

## 2021-01-03 PROCEDURE — 96374 THER/PROPH/DIAG INJ IV PUSH: CPT

## 2021-01-03 PROCEDURE — 6360000002 HC RX W HCPCS: Performed by: EMERGENCY MEDICINE

## 2021-01-03 PROCEDURE — 96375 TX/PRO/DX INJ NEW DRUG ADDON: CPT

## 2021-01-03 PROCEDURE — 80053 COMPREHEN METABOLIC PANEL: CPT

## 2021-01-03 PROCEDURE — 72170 X-RAY EXAM OF PELVIS: CPT

## 2021-01-03 PROCEDURE — 85025 COMPLETE CBC W/AUTO DIFF WBC: CPT

## 2021-01-03 RX ORDER — ONDANSETRON 2 MG/ML
4 INJECTION INTRAMUSCULAR; INTRAVENOUS ONCE
Status: COMPLETED | OUTPATIENT
Start: 2021-01-03 | End: 2021-01-03

## 2021-01-03 RX ORDER — MORPHINE SULFATE 4 MG/ML
4 INJECTION, SOLUTION INTRAMUSCULAR; INTRAVENOUS ONCE
Status: COMPLETED | OUTPATIENT
Start: 2021-01-03 | End: 2021-01-03

## 2021-01-03 RX ADMIN — ONDANSETRON HYDROCHLORIDE 4 MG: 2 SOLUTION INTRAMUSCULAR; INTRAVENOUS at 14:06

## 2021-01-03 RX ADMIN — MORPHINE SULFATE 4 MG: 4 INJECTION, SOLUTION INTRAMUSCULAR; INTRAVENOUS at 15:57

## 2021-01-03 RX ADMIN — MORPHINE SULFATE 4 MG: 4 INJECTION, SOLUTION INTRAMUSCULAR; INTRAVENOUS at 14:06

## 2021-01-03 ASSESSMENT — PAIN SCALES - GENERAL
PAINLEVEL_OUTOF10: 9
PAINLEVEL_OUTOF10: 9

## 2021-01-03 ASSESSMENT — ENCOUNTER SYMPTOMS
SHORTNESS OF BREATH: 0
DIARRHEA: 0
NAUSEA: 0
RHINORRHEA: 0
ABDOMINAL PAIN: 0
VOMITING: 0
SORE THROAT: 0
BACK PAIN: 1
COUGH: 0

## 2021-01-03 NOTE — ED PROVIDER NOTES
with renal manifestation ([facility] 06/1993    Gastroparesis     GERD (gastroesophageal reflux disease)     Hemodialysis patient ([facility] [address] [address]     dialysis on tues, thur, and sat at [facility]    Hypertension     Hypothyroid     Nasal congestion     recent    Noncompliance with medications     Palliative care patient 10/08/2019    Restless leg syndrome          SURGICAL HISTORY       Past Surgical History:   Procedure Laterality Date    BREAST SURGERY      infected milk gland removed, 2004    CHOLECYSTECTOMY, LAPAROSCOPIC      2008    COLONOSCOPY Left 9/17/2020    Dr Nelson Ora hepatic flexure-Severe tortuosity with severe spasming, 1 yr recall    DIALYSIS FISTULA CREATION Left 1/25/2019    REVISION LEFT UPPER EXTREMITY AV ACCESS WITH LEFT BRACHIAL ARTERY TO LEFT AXILLARY VEIN WITH  INTERPOSITIONAL ARTEGRAFT   performed by Neelam Oliver MD at [address]  2012    [address] Right 1/25/2019    INSERTION OF RIGHT INTERNAL JUGULAR HEMODIALYSIS CATHETER performed by Neelam Oliver MD at [address] [facility]  08/17/2018    1.  Moderately increased stainable iron identified by special stain in Kupffer cells and occasional hepatocytes. Negative for evidence of significant portal or lobular inflammation. Negative for evidence of significant fibrosis    KS COLONOSCOPY W/BIOPSY SINGLE/MULTIPLE N/A 10/20/2017    Dr Maury Dickinson prep-Tubular AP (-) dysplasia x 2--3 yr recall    [place] EGD TRANSORAL BIOPSY SINGLE/MULTIPLE N/A 12/27/2016    Dr Molly Mark EGD TRANSORAL BIOPSY SINGLE/MULTIPLE N/A 10/20/2017    Dr Gunnar Myrick (-)   [address] [facility] VASCULAR SURGERY Left 2016    fistula by Dr Glory Allan at [address]  10/02/2017    SJS. Left upper fistulograms/venograms, balloon angioplasty cephalic vein arch 85W06 conquest.    VASCULAR SURGERY  08/2018    FISTULOGRAM    VASCULAR SURGERY  10/29/2018    SJS. Left upper fistulograms/venograms    VASCULAR SURGERY  12/19/2018    SJS. Arch aortogram,left upper arteriograms.  VASCULAR SURGERY  03/06/2019    SJS. Removal of tunneled dialyis catheter right internal jugular vein.  VASCULAR SURGERY  08/28/2019    SJS. Left upper extremity fistulogram including venography to the superior vena cava. Balloon angioplasty left subclavian vein with 10mm x 40mm conquest balloon. Balloon angioplasty mid/proximal upper arm cephalic vein with 49LH x 40mm conquest balloon. Venograms after balloon angioplasty. Stent left mid/proximal upper arm cephalic vein stenosis fluency 10mm x 60mm self-expanding covered stent. Balloon     VASCULAR SURGERY  08/28/2019    cont angioplasty stent with 10mm x 40mm conquest balloon. Completion venograms left upper extremity.          CURRENT MEDICATIONS     Previous Medications    ALBUTEROL SULFATE  (90 BASE) MCG/ACT INHALER    Inhale 2 puffs into the lungs every 4 hours as needed    CLONIDINE (CATAPRES) 0.3 MG TABLET    Take 1 tablet by mouth every 8 hours    DULOXETINE (CYMBALTA) 60 MG EXTENDED RELEASE CAPSULE    Take 60 mg by mouth daily    ERGOCALCIFEROL (VITAMIN D2) 10 MCG (400 UNIT) TABS    Take 1.25 mg by mouth every 14 days    HYDRALAZINE (APRESOLINE) 50 MG TABLET    Take 1.5 tablets by mouth every 8 hours    HYDROCHLOROTHIAZIDE (HYDRODIURIL) 25 MG TABLET    Take 2 tablets by mouth daily    INSULIN ASPART (NOVOLOG FLEXPEN) 100 UNIT/ML INJECTION PEN    Inject 5 Units into the skin 3 times daily     INSULIN GLARGINE (LANTUS SOLOSTAR) 100 UNIT/ML INJECTION PEN    Inject 26 Units into the skin daily    ISOSORBIDE MONONITRATE (IMDUR) 120 MG EXTENDED RELEASE TABLET    Take 1 tablet by mouth daily    LEVETIRACETAM (KEPPRA) 500 MG TABLET    Take 1 tablet by mouth daily    LEVOTHYROXINE (SYNTHROID) 150 MCG TABLET    Take 150 mcg by mouth Daily    LINACLOTIDE (LINZESS) 290 MCG CAPS CAPSULE    Take 1 capsule by mouth every morning (before breakfast)    LISINOPRIL (PRINIVIL;ZESTRIL) 20 MG TABLET    Take 1 tablet by mouth 2 times daily    METOPROLOL SUCCINATE (TOPROL XL) 100 MG EXTENDED RELEASE TABLET    Take 1 tablet by mouth every morning (before breakfast)    NICOTINE (NICODERM CQ) 14 MG/24HR    Place 1 patch onto the skin daily for 14 days    NIFEDIPINE (ADALAT CC) 60 MG EXTENDED RELEASE TABLET    Take 60 mg by mouth daily    ONDANSETRON (ZOFRAN-ODT) 4 MG DISINTEGRATING TABLET    Take 4 mg by mouth every 8 hours as needed for Nausea or Vomiting    ROPINIROLE (REQUIP) 2 MG TABLET    Take 4 mg by mouth nightly Indications: Restless Leg Syndrome     SENNA (SENNA-LAX) 8.6 MG TABLET    Take 1 tablet by mouth daily    SEVELAMER (RENVELA) 800 MG TABLET    Take 1 tablet by mouth 3 times daily (with meals)    SIMVASTATIN (ZOCOR) 40 MG TABLET    Take 40 mg by mouth nightly        ALLERGIES     Penicillins, Lamisil advanced [terbinafine], Stadol [butorphanol], and Reglan [metoclopramide]    FAMILY HISTORY       Family History   Problem Relation Age of Onset    Colon Cancer Mother          at 61    Colon Polyps Mother     Lung Cancer Father          at 66    Colon Polyps Father     Stomach Cancer Sister     Breast Cancer Sister     Depression Daughter 25        committed suicide    Liver Cancer Neg Hx     Liver Disease Neg Hx     Esophageal Cancer Neg Hx     Rectal Cancer Neg Hx           SOCIAL HISTORY       Social History     Socioeconomic History    Marital status: Single     Spouse name: None    Number of children: 2    Years of education: None    Highest education level: None   Occupational History    None   Social Needs    Financial resource strain: None    Food insecurity     Worry: None     Inability: None    Transportation needs     Medical: None     Non-medical: None   Tobacco Use    Smoking status: Current Every Day Smoker     Packs/day: 0.50     Years: 33.00     Pack years: 16.50     Types: Cigarettes    Smokeless tobacco: Never Used    Tobacco comment: NOW at /4 PD, started at age 17 and has averaged 2 PPD   Substance and Sexual Activity    Alcohol use: No    Drug use: Not Currently     Types: Marijuana    Sexual activity: Not Currently     Partners: Male   Lifestyle    Physical activity     Days per week: None     Minutes per session: None    Stress: None   Relationships    Social connections     Talks on phone: None     Gets together: None     Attends Sikh service: None     Active member of club or organization: None     Attends meetings of clubs or organizations: None     Relationship status: None    Intimate partner violence     Fear of current or ex partner: None     Emotionally abused: None     Physically abused: None     Forced sexual activity: None   Other Topics Concern    None   Social History Narrative    None       SCREENINGS             PHYSICAL EXAM    (up to 7 for level 4, 8 or more for level 5)     ED Triage Vitals [01/03/21 1257]   BP Temp Temp src Pulse Resp SpO2 Height Weight   (!) 180/73 99 °F (37.2 °C) -- 71 18 100 % 5' 6\" (1.676 m) 140 lb (63.5 kg)       Physical Exam  Vitals signs and nursing note reviewed. Constitutional:       General: She is not in acute distress. Appearance: She is well-developed. She is not ill-appearing or diaphoretic. HENT:      Head: Normocephalic and atraumatic. Right Ear: External ear normal.      Left Ear: External ear normal.      Nose: Nose normal.      Mouth/Throat:      Mouth: Mucous membranes are moist.   Eyes:      Conjunctiva/sclera: Conjunctivae normal.   Neck:      Musculoskeletal: Normal range of motion. Trachea: No tracheal deviation. Cardiovascular:      Rate and Rhythm: Normal rate and regular rhythm. Pulses: Normal pulses. Heart sounds: Normal heart sounds. No murmur. Pulmonary:      Effort: Pulmonary effort is normal. No respiratory distress. Breath sounds: Normal breath sounds. No wheezing or rales. Abdominal:      Palpations: Abdomen is soft. There is no mass. Tenderness: There is no abdominal tenderness. Musculoskeletal: Normal range of motion. Right hip: She exhibits normal range of motion, no bony tenderness and no deformity. Left hip: She exhibits bony tenderness. She exhibits normal range of motion and no deformity. Cervical back: She exhibits no bony tenderness. Thoracic back: She exhibits no bony tenderness. Lumbar back: She exhibits bony tenderness. Comments: Does have some pain when ranging left hip but no obvious deformities and she is able to lift both legs off the bed unassisted    LUE fistula with palpable thrill,no bleeding   Skin:     General: Skin is warm and dry. Neurological:      Mental Status: She is alert and oriented to person, place, and time. GCS: GCS eye subscore is 4. GCS verbal subscore is 5. GCS motor subscore is 6. DIAGNOSTIC RESULTS         RADIOLOGY:  Non-plain film images such as CT, Ultrasound and MRI are read by the radiologist. Plain radiographic images are visualized and preliminarily interpreted bythe emergency physician with the below findings:      [facility]   Final Result   1. No acute osseous posttraumatic findings. Signed by Dr Joann Gutierrez on 1/3/2021 2:36 PM      XR PELVIS (1-2 VIEWS)   Final Result   Impression:   No acute osseous pathology. Signed by Dr Joann Gutierrez on 1/3/2021 2:04 PM      XR FEMUR LEFT (MIN 2 VIEWS)   Final Result   Impression:   No acute osseous pathology.    Signed by Dr Joann Gutierrez on 1/3/2021 2:04 PM              LABS:  Labs Reviewed   CBC WITH AUTO DIFFERENTIAL - Abnormal; Notable for the following components:       Result Value    RBC 4.13 (*)     Neutrophils % 75.4 (*)     Lymphocytes % 12.4 (*)     Basophils % 1.2 (*)     Lymphocytes Absolute 0.7 (*)     All other components within normal limits   COMPREHENSIVE METABOLIC PANEL - Abnormal; Notable for the following components:    Sodium 130 (*)     Chloride 86 (*)     Glucose 432 (*)     BUN 21 (*)     CREATININE 2.5 (*)     GFR Non- 20 (*)     GFR African American 24 (*)     Alkaline Phosphatase 119 (*)     All other components within normal limits       All other labs were within normal range or not returned as of this dictation. EMERGENCY DEPARTMENT COURSE and DIFFERENTIAL DIAGNOSIS/MDM:   Vitals:    Vitals:    01/03/21 1257   BP: (!) 180/73   Pulse: 71   Resp: 18   Temp: 99 °F (37.2 °C)   SpO2: 100%   Weight: 140 lb (63.5 kg)   Height: 5' 6\" (1.676 m)       MDM  Number of Diagnoses or Management Options     Amount and/or Complexity of Data Reviewed  Clinical lab tests: ordered and reviewed  Tests in the radiology section of CPT®: ordered and reviewed  Independent visualization of images, tracings, or specimens: yes      Pt well appearing, no obvious deformities and can range extremities, no acute findings on imaging today, will ambulate here and can follow up as outpt, understands return precautions      CONSULTS:  None    PROCEDURES:  Unless otherwise noted below, none     Procedures    FINAL IMPRESSION      1. Lumbar back pain    2. Left hip pain    3. Fall, initial encounter    4.  Hyperglycemia          DISPOSITION/PLAN   DISPOSITION Discharge - Pending Orders Complete 01/03/2021 02:43:25 PM      PATIENT REFERRED TO:  Melinda Stoddard, ROMINA  [address]  [phone]    Schedule an appointment as soon as possible for a visit in 1 week      [facility] EMERGENCY DEPT  Harvey Holman  [phone]    As needed, If symptoms worsen      DISCHARGE MEDICATIONS:  New Prescriptions    No medications on file          (Please note that portions of this note were completed with a voice recognition program.  Efforts were made to edit thedictations but occasionally words are mis-transcribed.)    Ajith Clancy MD (electronically signed)  Attending Emergency Physician        Saurav Rowe MD  01/03/21 6824

## 2021-01-03 NOTE — ED NOTES
Bed: 06  Expected date:   Expected time:   Means of arrival:   Comments:  jeison Membreno RN  01/03/21 2004

## 2021-01-13 ENCOUNTER — OFFICE VISIT (OUTPATIENT)
Dept: NEUROSURGERY | Age: 51
End: 2021-01-13
Payer: MEDICARE

## 2021-01-13 VITALS
HEART RATE: 63 BPM | HEIGHT: 66 IN | DIASTOLIC BLOOD PRESSURE: 79 MMHG | WEIGHT: 140 LBS | BODY MASS INDEX: 22.5 KG/M2 | SYSTOLIC BLOOD PRESSURE: 188 MMHG

## 2021-01-13 DIAGNOSIS — M46.1 SACROILIITIS (HCC): ICD-10-CM

## 2021-01-13 DIAGNOSIS — M54.16 LUMBAR RADICULOPATHY: ICD-10-CM

## 2021-01-13 PROCEDURE — 99204 OFFICE O/P NEW MOD 45 MIN: CPT | Performed by: NEUROLOGICAL SURGERY

## 2021-01-13 RX ORDER — METHYLPREDNISOLONE 4 MG/1
TABLET ORAL
Qty: 1 KIT | Refills: 0 | Status: SHIPPED | OUTPATIENT
Start: 2021-01-13 | End: 2021-01-19

## 2021-01-13 NOTE — PROGRESS NOTES
[id number] [facility] Neurosurgery  Office Visit        Chief Complaint   Patient presents with    Follow-up     back pain. HISTORY OF PRESENT ILLNESS:      The patient is a 45 y.o. female who presents as a referral from the [facility] ED for neurosurgical evaluation of left-sided lower back, hip and leg pain. She awoke with the pain several days after Eileen but cannot recall an inciting injury or event. She describes pain mostly in her left sacroiliac region that radiates to her right lateral thigh. She also describes numbness in her feet, though this is chronic for her and she does have a history of complicated diabetes, neuropathy and dialysis-dependent renal failure. Prior to this, she denies any significant back pain. She is now forced to walk with a cane which she has not done previously. She is currently attempting to control her pain with ibuprofen and pain pills that were left over from a previous dental surgery. She has not been prescribed any steroids. She has not done any physical therapy, seen pain management or had injections. When asked to localize the pain, she points to the left SI joint.       MEDICAL HISTORY:             Past Medical History:   Diagnosis Date    Arthritis     Chronic kidney disease, stage 5, kidney failure ([facility] [address]     Closed fracture of right shoulder girdle, with routine healing, subsequent encounter     DM (diabetes mellitus) type II controlled with renal manifestation ([address] 06/1993    Gastroparesis     GERD (gastroesophageal reflux disease)     Hemodialysis patient ([facility] [address]     dialysis on tues, thur, and sat at [facility]    Hypertension     Hypothyroid     Nasal congestion     recent    Noncompliance with medications     Palliative care patient 10/08/2019    Restless leg syndrome        Past Surgical History:   Procedure Laterality Date    BREAST SURGERY      infected milk gland removed, 2004    CHOLECYSTECTOMY, LAPAROSCOPIC      2008    COLONOSCOPY Left 9/17/2020    Dr Mancuso Cea hepatic flexure-Severe tortuosity with severe spasming, 1 yr recall    DIALYSIS FISTULA CREATION Left 1/25/2019    REVISION LEFT UPPER EXTREMITY AV ACCESS WITH LEFT BRACHIAL ARTERY TO LEFT AXILLARY VEIN WITH  INTERPOSITIONAL ARTEGRAFT   performed by Kelsea Gonsalves MD at [address]  2012    [address] Right 1/25/2019    INSERTION OF RIGHT INTERNAL JUGULAR HEMODIALYSIS CATHETER performed by Kelsea Gonsalves MD at [address]  08/17/2018    1.  Moderately increased stainable iron identified by special stain in Kupffer cells and occasional hepatocytes. Negative for evidence of significant portal or lobular inflammation. Negative for evidence of significant fibrosis    MI COLONOSCOPY W/BIOPSY SINGLE/MULTIPLE N/A 10/20/2017    Dr Chandler Bell prep-Tubular AP (-) dysplasia x 2--3 yr recall    MI EGD TRANSORAL BIOPSY SINGLE/MULTIPLE N/A 12/27/2016    Dr Mike Melchor EGD TRANSORAL BIOPSY SINGLE/MULTIPLE N/A 10/20/2017    Dr Sarabjit Cheung (-)   [address] [facility] VASCULAR SURGERY Left 2016    fistula by Dr Darrell Siegel at [address]  10/02/2017    SJS. Left upper fistulograms/venograms, balloon angioplasty cephalic vein arch [id number] conquest.    VASCULAR SURGERY  08/2018    FISTULOGRAM    VASCULAR SURGERY  10/29/2018    SJS. Left upper fistulograms/venograms    VASCULAR SURGERY  12/19/2018    SJS. Arch aortogram,left upper arteriograms.  VASCULAR SURGERY  03/06/2019    SJS. Removal of tunneled dialyis catheter right internal jugular vein.  VASCULAR SURGERY  08/28/2019    SJS. Left upper extremity fistulogram including venography to the superior vena cava. Balloon angioplasty left subclavian vein with 10mm x 40mm conquest balloon. Balloon angioplasty mid/proximal upper arm cephalic vein with 01JN x 40mm conquest balloon. Venograms after balloon angioplasty. Stent left mid/proximal upper arm cephalic vein stenosis fluency 10mm x 60mm self-expanding covered stent. Balloon     VASCULAR SURGERY  08/28/2019    cont angioplasty stent with 10mm x 40mm conquest balloon. Completion venograms left upper extremity.          Current Outpatient Medications:     methylPREDNISolone (MEDROL, THUY,) 4 MG tablet, Take by mouth., Disp: 1 kit, Rfl: 0    levETIRAcetam (KEPPRA) 500 MG tablet, Take 1 tablet by mouth daily, Disp: 60 tablet, Rfl: 3    hydrALAZINE (APRESOLINE) 50 MG tablet, Take 1.5 tablets by mouth every 8 hours, Disp: 90 tablet, Rfl: 0    hydroCHLOROthiazide (HYDRODIURIL) 25 MG tablet, Take 2 tablets by mouth daily, Disp: 30 tablet, Rfl: 3    insulin glargine (LANTUS SOLOSTAR) 100 UNIT/ML injection pen, Inject 26 Units into the skin daily, Disp: 5 pen, Rfl: 3    ondansetron (ZOFRAN-ODT) 4 MG disintegrating tablet, Take 4 mg by mouth every 8 hours as needed for Nausea or Vomiting, Disp: , Rfl:     Ergocalciferol (VITAMIN D2) 10 MCG (400 UNIT) TABS, Take 1.25 mg by mouth every 14 days, Disp: , Rfl:     senna (SENNA-LAX) 8.6 MG tablet, Take 1 tablet by mouth daily, Disp: , Rfl:     linaclotide (LINZESS) 290 MCG CAPS capsule, Take 1 capsule by mouth every morning (before breakfast), Disp: 30 capsule, Rfl: 11    cloNIDine (CATAPRES) 0.3 MG tablet, Take 1 tablet by mouth every 8 hours, Disp: 60 tablet, Rfl: 3    sevelamer (RENVELA) 800 MG tablet, Take 1 tablet by mouth 3 times daily (with meals), Disp: , Rfl:     lisinopril (PRINIVIL;ZESTRIL) 20 MG tablet, Take 1 tablet by mouth 2 times daily, Disp: 30 tablet, Rfl: 0    metoprolol succinate (TOPROL XL) 100 MG extended release tablet, Take 1 tablet by mouth every morning (before breakfast), Disp: 30 tablet, Rfl: 0    albuterol sulfate  (90 Base) MCG/ACT inhaler, Inhale 2 puffs into the lungs every 4 hours as needed, Disp: , Rfl:     levothyroxine (SYNTHROID) 150 MCG tablet, Take 150 mcg by mouth Daily, Disp: , Rfl:     NIFEdipine (ADALAT CC) 60 MG extended release tablet, Take 60 mg by mouth daily, Disp: , Rfl:     isosorbide mononitrate (IMDUR) 120 MG extended release tablet, Take 1 tablet by mouth daily, Disp: 30 tablet, Rfl: 0    DULoxetine (CYMBALTA) 60 MG extended release capsule, Take 60 mg by mouth daily, Disp: , Rfl:     rOPINIRole (REQUIP) 2 MG tablet, Take 4 mg by mouth nightly Indications: Restless Leg Syndrome , Disp: , Rfl:     simvastatin (ZOCOR) 40 MG tablet, Take 40 mg by mouth nightly , Disp: , Rfl:     insulin aspart (NOVOLOG FLEXPEN) 100 UNIT/ML injection pen, Inject 5 Units into the skin 3 times daily , Disp: , Rfl:     nicotine (NICODERM CQ) 14 MG/24HR, Place 1 patch onto the skin daily for 14 days, Disp: 14 patch, Rfl: 5    Allergies:  Penicillins, Lamisil advanced [terbinafine], Stadol [butorphanol], and Reglan [metoclopramide]    Social History:   Social History     Tobacco Use   Smoking Status Current Every Day Smoker    Packs/day: 0.50    Years: 33.00    Pack years: 16.50    Types: Cigarettes   Smokeless Tobacco Never Used   Tobacco Comment    NOW at 1/4 PD, started at age 24 and has averaged 2 PPD     Social History     Substance and Sexual Activity   Alcohol Use No         Family History:   Family History   Problem Relation Age of Onset    Colon Cancer Mother          at 61    Colon Polyps Mother     Lung Cancer Father          at 66    Colon Polyps Father     Stomach Cancer Sister     Breast Cancer Sister     Depression Daughter 25        committed suicide    Liver Cancer Neg Hx     Liver Disease Neg Hx     Esophageal Cancer Neg Hx     Rectal Cancer Neg Hx        REVIEW OF SYSTEMS:    Constitutional: Negative. HENT: Negative. Eyes: Negative. Respiratory: Negative. Cardiovascular: Negative. Gastrointestinal: Negative. Genitourinary: Negative. Musculoskeletal: Positive for back pain. Skin: Negative. Neurological: Negative. Endo/Heme/Allergies: Negative. Psychiatric/Behavioral: Negative.       Review of Systems was obtained by the medical assistant and reviewed by myself. PHYSICAL EXAM:    Vitals:    01/13/21 0914   BP: (!) 188/79   Pulse: 63       Constitutional: Appears thin and frail. Eyes - conjunctiva normal.  Pupils react to light  Ear, nose,throat -Normal pinna and tragus, No scars, or lesions over external nose or ears, no obvious atrophy of tongue  Neck- symmetric, trachea midline, no jugular vein distension  Respiration- chest wall symmetric, normal effort without use of accessory muscles  Musculoskeletal - Exquisitely tender to palpation over the left SI joint. Pelvic compression localizes pain to the left SI joint. ALBAN is positive on the left with pain in the sacroiliac region and medial hip. Extremities- no clubbing, cyanosis or edema  Skin - warm, dry, and intact. No rash,erythema, or pallor.   Psychiatric - mood, affect, and behavior appear normal.       NEUROLOGIC EXAM:    MENTAL STATUS: Alert, oriented, thought content appropriate    CRANIAL NERVES: PERRL, EOMI, symmetric facies, tongue midline    MOTOR:     Right Upper Extremity:    Deltoid: 5/5  Biceps: 5/5  Triceps: 5/5  Wrist Extension: 5/5  Finger Abduction: 5/5    Left Upper Extremity:    Deltoid: 5/5  Biceps: 5/5  Triceps: 5/5  Wrist Extension: 5/5  Finger Abduction: 5/5    Right Lower Extremity:    Hip Flexion: 5/5  Knee Extension: 5/5  Ankle Plantarflexion: 5/5  Ankle Dorsiflexion: 5/5      Left Lower Extremity:    Hip Flexion: 5/5  Knee Extension: 5/5  Ankle Plantarflexion: 5/5  Ankle Dorsiflexion: 5/5      SOMATOSENSORY:     Right Upper Extremity: normal light touch sensation  Left Upper Extremity: normal light touch sensation  Right Lower Extremity: Decreased to light touch in the foot in a non-dermatomal distribution  Left Lower Extremity: Decreased to light touch in the foot in a non-dermatomal distribution      REFLEXES:    Biceps: 1+  Patella: 1+  Ankle Jerk: 1+    Cabral's: Negative      COORDINATION and GAIT:    Gait:

## 2021-01-22 ENCOUNTER — TELEPHONE (OUTPATIENT)
Dept: NEUROSURGERY | Age: 51
End: 2021-01-22

## 2021-01-22 NOTE — TELEPHONE ENCOUNTER
Patient called stating that she would like the number to pain management so she can call and make her an appointment. Patient states that the number she has is not valid. I provided the patient with the correct number and she voiced that she would be calling them today.

## 2021-02-04 ENCOUNTER — HOSPITAL ENCOUNTER (OUTPATIENT)
Dept: WOMENS IMAGING | Age: 51
Discharge: HOME | End: 2021-02-04
Payer: MEDICARE

## 2021-02-04 DIAGNOSIS — N64.4 MASTODYNIA: ICD-10-CM

## 2021-02-04 DIAGNOSIS — N64.52 NIPPLE DISCHARGE: ICD-10-CM

## 2021-02-04 PROCEDURE — 76642 ULTRASOUND BREAST LIMITED: CPT

## 2021-02-04 PROCEDURE — 77066 DX MAMMO INCL CAD BI: CPT

## 2021-02-10 ENCOUNTER — TELEPHONE (OUTPATIENT)
Dept: NEUROSURGERY | Age: 51
End: 2021-02-10

## 2021-02-10 NOTE — TELEPHONE ENCOUNTER
Patient called and voiced that she has been in a lot of pain. She states that she can barely walk on her left leg, and is having trouble sleeping and eating due to the nausea from the pain. Patient is wanting to know if Dr Nabil Coffman will call her in something for the pain. Please advise, thank you.

## 2021-02-12 NOTE — TELEPHONE ENCOUNTER
Bart Crouch MD  You 2 days ago     I only prescribe pain medications in the context of surgery or planned surgery.  Medication requests will need to be routed to her PCP. Message text        Called patient back to let her know what Dr Ernesto Tan suggests. There was no answer and her mailbox was full, so I could not leave a message.

## 2021-02-19 ENCOUNTER — TELEPHONE (OUTPATIENT)
Dept: OTHER | Age: 51
End: 2021-02-19

## 2021-02-24 ENCOUNTER — OFFICE VISIT (OUTPATIENT)
Dept: NEUROSURGERY | Age: 51
End: 2021-02-24
Payer: MEDICARE

## 2021-02-24 ENCOUNTER — HOSPITAL ENCOUNTER (OUTPATIENT)
Age: 51
Setting detail: OBSERVATION
Discharge: HOME | End: 2021-02-25
Attending: EMERGENCY MEDICINE | Admitting: INTERNAL MEDICINE
Payer: MEDICARE

## 2021-02-24 ENCOUNTER — APPOINTMENT (OUTPATIENT)
Dept: GENERAL RADIOLOGY | Age: 51
End: 2021-02-24
Payer: MEDICARE

## 2021-02-24 VITALS
WEIGHT: 140 LBS | SYSTOLIC BLOOD PRESSURE: 147 MMHG | BODY MASS INDEX: 23.32 KG/M2 | HEART RATE: 66 BPM | HEIGHT: 65 IN | DIASTOLIC BLOOD PRESSURE: 74 MMHG

## 2021-02-24 DIAGNOSIS — M17.12 PRIMARY OSTEOARTHRITIS OF LEFT KNEE: ICD-10-CM

## 2021-02-24 DIAGNOSIS — E87.5 HYPERKALEMIA: ICD-10-CM

## 2021-02-24 DIAGNOSIS — E87.1 HYPONATREMIA: ICD-10-CM

## 2021-02-24 DIAGNOSIS — N18.6 END STAGE RENAL FAILURE ON DIALYSIS (HCC): ICD-10-CM

## 2021-02-24 DIAGNOSIS — M79.605 LEFT LEG PAIN: ICD-10-CM

## 2021-02-24 DIAGNOSIS — M79.652 LEFT THIGH PAIN: ICD-10-CM

## 2021-02-24 DIAGNOSIS — M16.12 PRIMARY OSTEOARTHRITIS OF LEFT HIP: Primary | ICD-10-CM

## 2021-02-24 DIAGNOSIS — R73.9 HYPERGLYCEMIA: ICD-10-CM

## 2021-02-24 DIAGNOSIS — Z99.2 END STAGE RENAL FAILURE ON DIALYSIS (HCC): ICD-10-CM

## 2021-02-24 DIAGNOSIS — M79.89 LEFT LEG SWELLING: ICD-10-CM

## 2021-02-24 DIAGNOSIS — R26.2 UNABLE TO AMBULATE: ICD-10-CM

## 2021-02-24 DIAGNOSIS — M46.1 SACROILIITIS (HCC): ICD-10-CM

## 2021-02-24 LAB
ALBUMIN SERPL-MCNC: 3.6 G/DL (ref 3.5–5.2)
ALP BLD-CCNC: 119 U/L (ref 35–104)
ALT SERPL-CCNC: 11 U/L (ref 5–33)
ANION GAP SERPL CALCULATED.3IONS-SCNC: 10 MMOL/L (ref 7–19)
APTT: 35.7 SEC (ref 26–36.2)
AST SERPL-CCNC: 14 U/L (ref 5–32)
BASOPHILS ABSOLUTE: 0.1 K/UL (ref 0–0.2)
BASOPHILS RELATIVE PERCENT: 0.6 % (ref 0–1)
BILIRUB SERPL-MCNC: 0.4 MG/DL (ref 0.2–1.2)
BUN BLDV-MCNC: 56 MG/DL (ref 6–20)
CALCIUM SERPL-MCNC: 9.5 MG/DL (ref 8.6–10)
CHLORIDE BLD-SCNC: 79 MMOL/L (ref 98–111)
CO2: 32 MMOL/L (ref 22–29)
CREAT SERPL-MCNC: 3.5 MG/DL (ref 0.5–0.9)
EOSINOPHILS ABSOLUTE: 0.2 K/UL (ref 0–0.6)
EOSINOPHILS RELATIVE PERCENT: 1.4 % (ref 0–5)
GFR AFRICAN AMERICAN: 17
GFR NON-AFRICAN AMERICAN: 14
GLUCOSE BLD-MCNC: 271 MG/DL (ref 70–99)
GLUCOSE BLD-MCNC: 548 MG/DL (ref 70–99)
GLUCOSE BLD-MCNC: 721 MG/DL (ref 74–109)
HCT VFR BLD CALC: 26.7 % (ref 37–47)
HEMOGLOBIN: 8.6 G/DL (ref 12–16)
IMMATURE GRANULOCYTES #: 0.1 K/UL
INR BLD: 1.01 (ref 0.88–1.18)
LYMPHOCYTES ABSOLUTE: 0.8 K/UL (ref 1.1–4.5)
LYMPHOCYTES RELATIVE PERCENT: 7.2 % (ref 20–40)
MCH RBC QN AUTO: 31 PG (ref 27–31)
MCHC RBC AUTO-ENTMCNC: 32.2 G/DL (ref 33–37)
MCV RBC AUTO: 96.4 FL (ref 81–99)
MONOCYTES ABSOLUTE: 0.7 K/UL (ref 0–0.9)
MONOCYTES RELATIVE PERCENT: 6.7 % (ref 0–10)
NEUTROPHILS ABSOLUTE: 9 K/UL (ref 1.5–7.5)
NEUTROPHILS RELATIVE PERCENT: 83.6 % (ref 50–65)
PDW BLD-RTO: 13.9 % (ref 11.5–14.5)
PERFORMED ON: ABNORMAL
PERFORMED ON: ABNORMAL
PLATELET # BLD: 174 K/UL (ref 130–400)
PMV BLD AUTO: 9.9 FL (ref 9.4–12.3)
POTASSIUM REFLEX MAGNESIUM: 6 MMOL/L (ref 3.5–5)
PRO-BNP: ABNORMAL PG/ML (ref 0–900)
PROTHROMBIN TIME: 13.2 SEC (ref 12–14.6)
RBC # BLD: 2.77 M/UL (ref 4.2–5.4)
S PYO AG THROAT QL: NEGATIVE
SARS-COV-2, NAAT: NOT DETECTED
SODIUM BLD-SCNC: 121 MMOL/L (ref 136–145)
TOTAL PROTEIN: 6.9 G/DL (ref 6.6–8.7)
WBC # BLD: 10.8 K/UL (ref 4.8–10.8)

## 2021-02-24 PROCEDURE — 73560 X-RAY EXAM OF KNEE 1 OR 2: CPT

## 2021-02-24 PROCEDURE — G0378 HOSPITAL OBSERVATION PER HR: HCPCS

## 2021-02-24 PROCEDURE — 99283 EMERGENCY DEPT VISIT LOW MDM: CPT

## 2021-02-24 PROCEDURE — 6370000000 HC RX 637 (ALT 250 FOR IP): Performed by: EMERGENCY MEDICINE

## 2021-02-24 PROCEDURE — 36415 COLL VENOUS BLD VENIPUNCTURE: CPT

## 2021-02-24 PROCEDURE — 6360000002 HC RX W HCPCS: Performed by: PHYSICIAN ASSISTANT

## 2021-02-24 PROCEDURE — 87880 STREP A ASSAY W/OPTIC: CPT

## 2021-02-24 PROCEDURE — 87635 SARS-COV-2 COVID-19 AMP PRB: CPT

## 2021-02-24 PROCEDURE — 80053 COMPREHEN METABOLIC PANEL: CPT

## 2021-02-24 PROCEDURE — 6360000002 HC RX W HCPCS: Performed by: EMERGENCY MEDICINE

## 2021-02-24 PROCEDURE — 6370000000 HC RX 637 (ALT 250 FOR IP): Performed by: HOSPITALIST

## 2021-02-24 PROCEDURE — 85730 THROMBOPLASTIN TIME PARTIAL: CPT

## 2021-02-24 PROCEDURE — 87081 CULTURE SCREEN ONLY: CPT

## 2021-02-24 PROCEDURE — 96375 TX/PRO/DX INJ NEW DRUG ADDON: CPT

## 2021-02-24 PROCEDURE — 94640 AIRWAY INHALATION TREATMENT: CPT

## 2021-02-24 PROCEDURE — 73502 X-RAY EXAM HIP UNI 2-3 VIEWS: CPT

## 2021-02-24 PROCEDURE — 2500000003 HC RX 250 WO HCPCS: Performed by: EMERGENCY MEDICINE

## 2021-02-24 PROCEDURE — 96365 THER/PROPH/DIAG IV INF INIT: CPT

## 2021-02-24 PROCEDURE — 96376 TX/PRO/DX INJ SAME DRUG ADON: CPT

## 2021-02-24 PROCEDURE — 85025 COMPLETE CBC W/AUTO DIFF WBC: CPT

## 2021-02-24 PROCEDURE — 99214 OFFICE O/P EST MOD 30 MIN: CPT | Performed by: NEUROLOGICAL SURGERY

## 2021-02-24 PROCEDURE — 2580000003 HC RX 258: Performed by: EMERGENCY MEDICINE

## 2021-02-24 PROCEDURE — 2580000003 HC RX 258: Performed by: PHYSICIAN ASSISTANT

## 2021-02-24 PROCEDURE — 8010000000 HC HEMODIALYSIS ACUTE INPT

## 2021-02-24 PROCEDURE — 6370000000 HC RX 637 (ALT 250 FOR IP): Performed by: INTERNAL MEDICINE

## 2021-02-24 PROCEDURE — 96374 THER/PROPH/DIAG INJ IV PUSH: CPT

## 2021-02-24 PROCEDURE — 93971 EXTREMITY STUDY: CPT

## 2021-02-24 PROCEDURE — 85610 PROTHROMBIN TIME: CPT

## 2021-02-24 PROCEDURE — 93005 ELECTROCARDIOGRAM TRACING: CPT | Performed by: PHYSICIAN ASSISTANT

## 2021-02-24 PROCEDURE — 83880 ASSAY OF NATRIURETIC PEPTIDE: CPT

## 2021-02-24 PROCEDURE — 82947 ASSAY GLUCOSE BLOOD QUANT: CPT

## 2021-02-24 RX ORDER — ONDANSETRON 4 MG/1
4 TABLET, ORALLY DISINTEGRATING ORAL EVERY 8 HOURS PRN
Status: DISCONTINUED | OUTPATIENT
Start: 2021-02-24 | End: 2021-02-25 | Stop reason: HOSPADM

## 2021-02-24 RX ORDER — ROPINIROLE 4 MG/1
4 TABLET, FILM COATED ORAL NIGHTLY
Status: DISCONTINUED | OUTPATIENT
Start: 2021-02-24 | End: 2021-02-25 | Stop reason: HOSPADM

## 2021-02-24 RX ORDER — METOPROLOL SUCCINATE 50 MG/1
100 TABLET, EXTENDED RELEASE ORAL
Status: DISCONTINUED | OUTPATIENT
Start: 2021-02-25 | End: 2021-02-25 | Stop reason: HOSPADM

## 2021-02-24 RX ORDER — LEVOTHYROXINE SODIUM 0.07 MG/1
150 TABLET ORAL DAILY
Status: DISCONTINUED | OUTPATIENT
Start: 2021-02-25 | End: 2021-02-25 | Stop reason: HOSPADM

## 2021-02-24 RX ORDER — LEVETIRACETAM 500 MG/1
500 TABLET ORAL DAILY
Status: DISCONTINUED | OUTPATIENT
Start: 2021-02-24 | End: 2021-02-25 | Stop reason: HOSPADM

## 2021-02-24 RX ORDER — CLONIDINE HYDROCHLORIDE 0.3 MG/1
0.3 TABLET ORAL EVERY 8 HOURS SCHEDULED
Status: DISCONTINUED | OUTPATIENT
Start: 2021-02-24 | End: 2021-02-25 | Stop reason: HOSPADM

## 2021-02-24 RX ORDER — HYDROCHLOROTHIAZIDE 25 MG/1
50 TABLET ORAL DAILY
Status: DISCONTINUED | OUTPATIENT
Start: 2021-02-24 | End: 2021-02-25 | Stop reason: HOSPADM

## 2021-02-24 RX ORDER — ISOSORBIDE MONONITRATE 60 MG/1
120 TABLET, EXTENDED RELEASE ORAL DAILY
Status: DISCONTINUED | OUTPATIENT
Start: 2021-02-24 | End: 2021-02-25 | Stop reason: HOSPADM

## 2021-02-24 RX ORDER — LISINOPRIL 20 MG/1
20 TABLET ORAL 2 TIMES DAILY
Status: DISCONTINUED | OUTPATIENT
Start: 2021-02-24 | End: 2021-02-25 | Stop reason: HOSPADM

## 2021-02-24 RX ORDER — LIDOCAINE 4 G/G
1 PATCH TOPICAL ONCE
Status: COMPLETED | OUTPATIENT
Start: 2021-02-24 | End: 2021-02-25

## 2021-02-24 RX ORDER — IPRATROPIUM BROMIDE AND ALBUTEROL SULFATE 2.5; .5 MG/3ML; MG/3ML
1 SOLUTION RESPIRATORY (INHALATION) 4 TIMES DAILY
Status: DISCONTINUED | OUTPATIENT
Start: 2021-02-24 | End: 2021-02-25 | Stop reason: HOSPADM

## 2021-02-24 RX ORDER — ERGOCALCIFEROL 1.25 MG/1
50000 CAPSULE ORAL
Status: DISCONTINUED | OUTPATIENT
Start: 2021-02-24 | End: 2021-02-25 | Stop reason: HOSPADM

## 2021-02-24 RX ORDER — MORPHINE SULFATE 4 MG/ML
4 INJECTION, SOLUTION INTRAMUSCULAR; INTRAVENOUS ONCE
Status: COMPLETED | OUTPATIENT
Start: 2021-02-24 | End: 2021-02-24

## 2021-02-24 RX ORDER — DULOXETIN HYDROCHLORIDE 30 MG/1
60 CAPSULE, DELAYED RELEASE ORAL DAILY
Status: DISCONTINUED | OUTPATIENT
Start: 2021-02-24 | End: 2021-02-25 | Stop reason: HOSPADM

## 2021-02-24 RX ORDER — NIFEDIPINE 60 MG/1
60 TABLET, EXTENDED RELEASE ORAL DAILY
Status: DISCONTINUED | OUTPATIENT
Start: 2021-02-24 | End: 2021-02-25 | Stop reason: HOSPADM

## 2021-02-24 RX ORDER — INSULIN GLARGINE 100 [IU]/ML
36 INJECTION, SOLUTION SUBCUTANEOUS DAILY
Status: DISCONTINUED | OUTPATIENT
Start: 2021-02-24 | End: 2021-02-25 | Stop reason: HOSPADM

## 2021-02-24 RX ORDER — ACETAMINOPHEN 325 MG/1
650 TABLET ORAL EVERY 4 HOURS PRN
Status: DISCONTINUED | OUTPATIENT
Start: 2021-02-24 | End: 2021-02-25 | Stop reason: HOSPADM

## 2021-02-24 RX ORDER — SEVELAMER CARBONATE 800 MG/1
800 TABLET, FILM COATED ORAL
Status: DISCONTINUED | OUTPATIENT
Start: 2021-02-24 | End: 2021-02-25 | Stop reason: HOSPADM

## 2021-02-24 RX ORDER — NICOTINE 21 MG/24HR
1 PATCH, TRANSDERMAL 24 HOURS TRANSDERMAL DAILY
Status: DISCONTINUED | OUTPATIENT
Start: 2021-02-24 | End: 2021-02-25 | Stop reason: HOSPADM

## 2021-02-24 RX ORDER — SENNA PLUS 8.6 MG/1
1 TABLET ORAL DAILY
Status: DISCONTINUED | OUTPATIENT
Start: 2021-02-24 | End: 2021-02-25 | Stop reason: HOSPADM

## 2021-02-24 RX ORDER — ALBUTEROL SULFATE 2.5 MG/3ML
2.5 SOLUTION RESPIRATORY (INHALATION) EVERY 6 HOURS PRN
Status: DISCONTINUED | OUTPATIENT
Start: 2021-02-24 | End: 2021-02-24

## 2021-02-24 RX ORDER — ATORVASTATIN CALCIUM 40 MG/1
40 TABLET, FILM COATED ORAL DAILY
Status: DISCONTINUED | OUTPATIENT
Start: 2021-02-24 | End: 2021-02-25 | Stop reason: HOSPADM

## 2021-02-24 RX ORDER — 0.9 % SODIUM CHLORIDE 0.9 %
1000 INTRAVENOUS SOLUTION INTRAVENOUS ONCE
Status: COMPLETED | OUTPATIENT
Start: 2021-02-24 | End: 2021-02-24

## 2021-02-24 RX ORDER — CALCIUM GLUCONATE 94 MG/ML
1000 INJECTION, SOLUTION INTRAVENOUS ONCE
Status: COMPLETED | OUTPATIENT
Start: 2021-02-24 | End: 2021-02-24

## 2021-02-24 RX ORDER — ONDANSETRON 2 MG/ML
4 INJECTION INTRAMUSCULAR; INTRAVENOUS ONCE
Status: COMPLETED | OUTPATIENT
Start: 2021-02-24 | End: 2021-02-24

## 2021-02-24 RX ORDER — ALBUTEROL SULFATE 2.5 MG/3ML
2.5 SOLUTION RESPIRATORY (INHALATION) ONCE
Status: COMPLETED | OUTPATIENT
Start: 2021-02-24 | End: 2021-02-24

## 2021-02-24 RX ADMIN — HYDROCHLOROTHIAZIDE 50 MG: 25 TABLET ORAL at 21:42

## 2021-02-24 RX ADMIN — LISINOPRIL 20 MG: 20 TABLET ORAL at 21:42

## 2021-02-24 RX ADMIN — SODIUM CHLORIDE 6 UNITS/HR: 9 INJECTION, SOLUTION INTRAVENOUS at 16:10

## 2021-02-24 RX ADMIN — ROPINIROLE HYDROCHLORIDE 4 MG: 4 TABLET, FILM COATED ORAL at 21:42

## 2021-02-24 RX ADMIN — ALBUTEROL SULFATE 2.5 MG: 2.5 SOLUTION RESPIRATORY (INHALATION) at 14:39

## 2021-02-24 RX ADMIN — CALCIUM GLUCONATE 1000 MG: 98 INJECTION, SOLUTION INTRAVENOUS at 13:19

## 2021-02-24 RX ADMIN — IPRATROPIUM BROMIDE AND ALBUTEROL SULFATE 1 AMPULE: 2.5; .5 SOLUTION RESPIRATORY (INHALATION) at 22:07

## 2021-02-24 RX ADMIN — ACETAMINOPHEN 650 MG: 325 TABLET ORAL at 21:43

## 2021-02-24 RX ADMIN — DULOXETINE HYDROCHLORIDE 60 MG: 30 CAPSULE, DELAYED RELEASE ORAL at 21:42

## 2021-02-24 RX ADMIN — ONDANSETRON HYDROCHLORIDE 4 MG: 2 SOLUTION INTRAMUSCULAR; INTRAVENOUS at 11:46

## 2021-02-24 RX ADMIN — CLONIDINE HYDROCHLORIDE 0.3 MG: 0.3 TABLET ORAL at 21:42

## 2021-02-24 RX ADMIN — INSULIN LISPRO 3 UNITS: 100 INJECTION, SOLUTION INTRAVENOUS; SUBCUTANEOUS at 23:18

## 2021-02-24 RX ADMIN — NIFEDIPINE 60 MG: 60 TABLET, FILM COATED, EXTENDED RELEASE ORAL at 21:42

## 2021-02-24 RX ADMIN — MORPHINE SULFATE 4 MG: 4 INJECTION, SOLUTION INTRAMUSCULAR; INTRAVENOUS at 11:47

## 2021-02-24 RX ADMIN — STANDARDIZED SENNA CONCENTRATE 8.6 MG: 8.6 TABLET ORAL at 21:43

## 2021-02-24 RX ADMIN — LEVETIRACETAM 500 MG: 500 TABLET ORAL at 21:43

## 2021-02-24 RX ADMIN — SODIUM BICARBONATE 50 MEQ: 84 INJECTION, SOLUTION INTRAVENOUS at 13:18

## 2021-02-24 RX ADMIN — ISOSORBIDE MONONITRATE 120 MG: 60 TABLET, EXTENDED RELEASE ORAL at 21:43

## 2021-02-24 RX ADMIN — INSULIN HUMAN 10 UNITS: 100 INJECTION, SOLUTION PARENTERAL at 13:24

## 2021-02-24 RX ADMIN — ERGOCALCIFEROL 50000 UNITS: 1.25 CAPSULE ORAL at 21:43

## 2021-02-24 RX ADMIN — SODIUM CHLORIDE 1000 ML: 9 INJECTION, SOLUTION INTRAVENOUS at 11:45

## 2021-02-24 RX ADMIN — HYDRALAZINE HYDROCHLORIDE 75 MG: 25 TABLET, FILM COATED ORAL at 21:42

## 2021-02-24 RX ADMIN — INSULIN GLARGINE 36 UNITS: 100 INJECTION, SOLUTION SUBCUTANEOUS at 23:16

## 2021-02-24 RX ADMIN — ATORVASTATIN CALCIUM 40 MG: 40 TABLET, FILM COATED ORAL at 21:42

## 2021-02-24 ASSESSMENT — ENCOUNTER SYMPTOMS
DIARRHEA: 0
ABDOMINAL PAIN: 0
EYE DISCHARGE: 0
APNEA: 0
COUGH: 0
VOMITING: 1
VOMITING: 0
COLOR CHANGE: 0
NAUSEA: 1
BACK PAIN: 0
BACK PAIN: 1
VOICE CHANGE: 0
CONSTIPATION: 0
ABDOMINAL DISTENTION: 0
RHINORRHEA: 0
PHOTOPHOBIA: 0
NAUSEA: 0
SORE THROAT: 0
SHORTNESS OF BREATH: 0
EYE PAIN: 0

## 2021-02-24 ASSESSMENT — PAIN DESCRIPTION - PAIN TYPE
TYPE: ACUTE PAIN
TYPE: ACUTE PAIN

## 2021-02-24 ASSESSMENT — PAIN DESCRIPTION - LOCATION
LOCATION: LEG
LOCATION: LEG

## 2021-02-24 ASSESSMENT — PAIN DESCRIPTION - FREQUENCY: FREQUENCY: CONTINUOUS

## 2021-02-24 ASSESSMENT — PAIN SCALES - GENERAL
PAINLEVEL_OUTOF10: 8
PAINLEVEL_OUTOF10: 0

## 2021-02-24 ASSESSMENT — PAIN DESCRIPTION - PROGRESSION: CLINICAL_PROGRESSION: NOT CHANGED

## 2021-02-24 ASSESSMENT — PAIN DESCRIPTION - ONSET: ONSET: ON-GOING

## 2021-02-24 ASSESSMENT — PAIN DESCRIPTION - ORIENTATION
ORIENTATION: LEFT
ORIENTATION: LEFT

## 2021-02-24 ASSESSMENT — PAIN DESCRIPTION - DESCRIPTORS: DESCRIPTORS: ACHING

## 2021-02-24 NOTE — PROGRESS NOTES
Patient transferred to 3rd floor dialysis and bedside report given to [facility], RN. Nursing report given to 3rd floor JAZZ Caba via telephone. Pt to go to  after dialysis.      Electronically signed by Chiki Nguyen RN on 2/24/2021 at 5:26 PM

## 2021-02-24 NOTE — ED PROVIDER NOTES
[facility] 3 PARUL/VAS/MED  eMERGENCYdEPARTMENT eNCOUnter      Pt Name: Wendy Lim  MRN: [id number]  [place] 1970  Date of evaluation: 2/24/2021  Provider:HECTOR Marks    CHIEF COMPLAINT       Chief Complaint   Patient presents with    Leg Swelling         HISTORY OF PRESENT ILLNESS  (Location/Symptom, Timing/Onset, Context/Setting, Quality, Duration, Modifying Factors, Severity.)   Wendy Lim is a 45 y.o. female who presents to the emergency department with complaints of gradual onset worsening of lower left extremity pain it is in her buttock as well as in her groin thigh and calf she has edema history of respiratory failure she is very jaundiced I do not see any cirrhotic diagnosis she does have an abnormal CT of her liver in the past history of her regular vascular disease no blood thinners. No injury. Patient was sent here after seeing Dr. Reynaldo Glass today. Neurosurgeon sent here for imaging to rule out blood clot. Has been treating her for sciatica giving her injections and recommended orthopedic referral if no findings avascular leg is warm pulsatile. Demanding narcotics. Emmie Silveira pending. Emesis x 2 from the pain. No abdominal pain or involvement. [facility] dialysis T Th and Sat  Denies blood in stool to me. Hx of gastroporesis causing chronic NV    Emmie Silveira request #[id number]    Active cumulative morphine equivalent 0    HPI    Nursing Notes were reviewed and I agree. REVIEW OF SYSTEMS    (2-9 systems for level 4, 10 or more for level 5)     Review of Systems   Constitutional: Negative for activity change, appetite change, chills and fever. HENT: Negative for congestion, postnasal drip, rhinorrhea and sore throat. Eyes: Negative for photophobia, pain, discharge and visual disturbance. Respiratory: Negative for apnea, cough and shortness of breath. Cardiovascular: Negative for chest pain and leg swelling. Gastrointestinal: Positive for nausea and vomiting.  Negative for abdominal distention and abdominal pain. Genitourinary: Negative for vaginal bleeding. Musculoskeletal: Positive for arthralgias, joint swelling and myalgias. Negative for back pain, neck pain and neck stiffness. Skin: Negative for color change and rash. Neurological: Negative for dizziness, syncope, facial asymmetry and headaches. Hematological: Negative for adenopathy. Does not bruise/bleed easily. Psychiatric/Behavioral: Negative for agitation, behavioral problems and confusion. Except as noted above the remainder of the review of systems was reviewed and negative. PAST MEDICAL HISTORY     Past Medical History:   Diagnosis Date    Arthritis     Chronic kidney disease, stage 5, kidney failure [address]     Closed fracture of right shoulder girdle, with routine healing, subsequent encounter     DM (diabetes mellitus) type II controlled with renal manifestation ([facility] [address] [facility] 06/1993    Gastroparesis     GERD (gastroesophageal reflux disease)     Hemodialysis patient ([facility] [address] [facility]     dialysis on tues[facility] thur, and sat [facility]    Hypertension     Hypothyroid     Nasal congestion     recent    Noncompliance with medications     Palliative care patient 10/08/2019    Restless leg syndrome          SURGICAL HISTORY       Past Surgical History:   Procedure Laterality Date    BREAST SURGERY Left 2008    infected milk gland removed    CHOLECYSTECTOMY, LAPAROSCOPIC      2008    COLONOSCOPY Left 9/17/2020    Dr Laverne Greene hepatic flexure-Severe tortuosity with severe spasming, 1 yr recall    DIALYSIS FISTULA CREATION Left 1/25/2019    REVISION LEFT UPPER EXTREMITY AV ACCESS WITH LEFT BRACHIAL ARTERY TO LEFT AXILLARY VEIN WITH  INTERPOSITIONAL ARTEGRAFT   performed by Wendy Chan MD at [address]  2012    [address] Right 1/25/2019    INSERTION OF RIGHT INTERNAL JUGULAR HEMODIALYSIS CATHETER performed by Wendy Chan MD at [address] [facility] LIVER BIOPSY  08/17/2018    1.  Moderately increased stainable iron identified by special stain in Kupffer cells and occasional hepatocytes. Negative for evidence of significant portal or lobular inflammation. Negative for evidence of significant fibrosis    [place] COLONOSCOPY W/BIOPSY SINGLE/MULTIPLE N/A 10/20/2017    Dr Lexx Greene prep-Tubular AP (-) dysplasia x 2--3 yr recall    [place] EGD TRANSORAL BIOPSY SINGLE/MULTIPLE N/A 12/27/2016    Dr Cheyanne Celis EGD TRANSORAL BIOPSY SINGLE/MULTIPLE N/A 10/20/2017    Dr Daylin Renee (-)   [address] [facility] VASCULAR SURGERY Left 2016    fistula by Dr Ofe Clay at [address]  10/02/2017    [facility]. Left upper fistulograms/venograms, balloon angioplasty cephalic vein arch [id number] conquest.    VASCULAR SURGERY  08/2018    FISTULOGRAM    VASCULAR SURGERY  10/29/2018    [facility]. Left upper fistulograms/venograms    VASCULAR SURGERY  12/19/2018    [facility]. Arch aortogram,left upper arteriograms.  VASCULAR SURGERY  03/06/2019    [facility]. Removal of tunneled dialyis catheter right internal jugular vein.  VASCULAR SURGERY  08/28/2019    [facility]. Left upper extremity fistulogram including venography to the superior vena cava. Balloon angioplasty left subclavian vein with 10mm x 40mm conquest balloon. Balloon angioplasty mid/proximal upper arm cephalic vein with 32RY x 40mm conquest balloon. Venograms after balloon angioplasty. Stent left mid/proximal upper arm cephalic vein stenosis fluency 10mm x 60mm self-expanding covered stent. Balloon     VASCULAR SURGERY  08/28/2019    cont angioplasty stent with 10mm x 40mm conquest balloon. Completion venograms left upper extremity.          CURRENT MEDICATIONS       Current Discharge Medication List      CONTINUE these medications which have NOT CHANGED    Details   levETIRAcetam (KEPPRA) 500 MG tablet Take 1 tablet by mouth daily  Qty: 60 tablet, Refills: 3      hydrALAZINE (APRESOLINE) 50 MG tablet Take 1.5 tablets by mouth every 8 hours  Qty: 90 tablet, Refills: 0 hydroCHLOROthiazide (HYDRODIURIL) 25 MG tablet Take 2 tablets by mouth daily  Qty: 30 tablet, Refills: 3      insulin glargine (LANTUS SOLOSTAR) 100 UNIT/ML injection pen Inject 26 Units into the skin daily  Qty: 5 pen, Refills: 3      nicotine (NICODERM CQ) 14 MG/24HR Place 1 patch onto the skin daily for 14 days  Qty: 14 patch, Refills: 5      ondansetron (ZOFRAN-ODT) 4 MG disintegrating tablet Take 4 mg by mouth every 8 hours as needed for Nausea or Vomiting      Ergocalciferol (VITAMIN D2) 10 MCG (400 UNIT) TABS Take 1.25 mg by mouth every 14 days      senna (SENNA-LAX) 8.6 MG tablet Take 1 tablet by mouth daily      linaclotide (LINZESS) 290 MCG CAPS capsule Take 1 capsule by mouth every morning (before breakfast)  Qty: 30 capsule, Refills: 11    Associated Diagnoses: Chronic constipation      cloNIDine (CATAPRES) 0.3 MG tablet Take 1 tablet by mouth every 8 hours  Qty: 60 tablet, Refills: 3      sevelamer (RENVELA) 800 MG tablet Take 1 tablet by mouth 3 times daily (with meals)      lisinopril (PRINIVIL;ZESTRIL) 20 MG tablet Take 1 tablet by mouth 2 times daily  Qty: 30 tablet, Refills: 0      metoprolol succinate (TOPROL XL) 100 MG extended release tablet Take 1 tablet by mouth every morning (before breakfast)  Qty: 30 tablet, Refills: 0      albuterol sulfate  (90 Base) MCG/ACT inhaler Inhale 2 puffs into the lungs every 4 hours as needed      levothyroxine (SYNTHROID) 150 MCG tablet Take 150 mcg by mouth Daily      NIFEdipine (ADALAT CC) 60 MG extended release tablet Take 60 mg by mouth daily      isosorbide mononitrate (IMDUR) 120 MG extended release tablet Take 1 tablet by mouth daily  Qty: 30 tablet, Refills: 0      DULoxetine (CYMBALTA) 60 MG extended release capsule Take 60 mg by mouth daily      rOPINIRole (REQUIP) 2 MG tablet Take 4 mg by mouth nightly Indications: Restless Leg Syndrome       simvastatin (ZOCOR) 40 MG tablet Take 40 mg by mouth nightly       insulin aspart (NOVOLOG FLEXPEN) 100 UNIT/ML injection pen Inject 5 Units into the skin 3 times daily              ALLERGIES     Penicillins, Lamisil advanced [terbinafine], Stadol [butorphanol], and Reglan [metoclopramide]    FAMILY HISTORY       Family History   Problem Relation Age of Onset    Colon Cancer Mother          at 61    Colon Polyps Mother     Lung Cancer Father          at 66    Colon Polyps Father     Stomach Cancer Sister     Breast Cancer Sister 27    Depression Daughter 25        committed suicide    Liver Cancer Neg Hx     Liver Disease Neg Hx     Esophageal Cancer Neg Hx     Rectal Cancer Neg Hx           SOCIAL HISTORY       Social History     Socioeconomic History    Marital status: Single     Spouse name: None    Number of children: 2    Years of education: None    Highest education level: None   Occupational History    None   Social Needs    Financial resource strain: None    Food insecurity     Worry: None     Inability: None    Transportation needs     Medical: None     Non-medical: None   Tobacco Use    Smoking status: Current Every Day Smoker     Packs/day: 0.50     Years: 33.00     Pack years: 16.50     Types: Cigarettes    Smokeless tobacco: Never Used    Tobacco comment: NOW at 1/4 PD, started at age 24 and has averaged 2 PPD   Substance and Sexual Activity    Alcohol use: No    Drug use: Not Currently     Types: Marijuana    Sexual activity: Not Currently     Partners: Male   Lifestyle    Physical activity     Days per week: None     Minutes per session: None    Stress: None   Relationships    Social connections     Talks on phone: None     Gets together: None     Attends Tenriism service: None     Active member of club or organization: None     Attends meetings of clubs or organizations: None     Relationship status: None    Intimate partner violence     Fear of current or ex partner: None     Emotionally abused: None     Physically abused: None     Forced sexual activity: None   Other Topics Concern    None   Social History Narrative    None       SCREENINGS           PHYSICAL EXAM    (up to 7 forlevel 4, 8 or more for level 5)     ED Triage Vitals [02/24/21 0938]   BP Temp Temp Source Pulse Resp SpO2 Height Weight   (!) 195/71 97.9 °F (36.6 °C) Oral 73 16 100 % 5' 5\" (1.651 m) 140 lb (63.5 kg)       Physical Exam  Vitals signs and nursing note reviewed. Constitutional:       General: She is not in acute distress. Appearance: Normal appearance. She is well-developed. She is not diaphoretic. HENT:      Head: Normocephalic and atraumatic. Right Ear: External ear normal.      Left Ear: External ear normal.      Nose: Nose normal.      Mouth/Throat:      Mouth: Mucous membranes are moist.      Pharynx: No oropharyngeal exudate. Eyes:      General:         Right eye: No discharge. Left eye: No discharge. Pupils: Pupils are equal, round, and reactive to light. Neck:      Musculoskeletal: Normal range of motion and neck supple. Thyroid: No thyromegaly. Cardiovascular:      Rate and Rhythm: Normal rate and regular rhythm. Pulses: Normal pulses. Heart sounds: Normal heart sounds. No murmur. No friction rub. Pulmonary:      Effort: Pulmonary effort is normal. No respiratory distress. Breath sounds: Normal breath sounds. No stridor. No wheezing. Abdominal:      General: Abdomen is flat. Bowel sounds are normal. There is no distension. Palpations: Abdomen is soft. Tenderness: There is no abdominal tenderness. Genitourinary:     Rectum: Normal. Guaiac result negative. Musculoskeletal:         General: Swelling and tenderness present. No deformity or signs of injury. Right lower leg: No edema. Left lower leg: Edema present. Legs:    Skin:     General: Skin is warm and dry. Capillary Refill: Capillary refill takes less than 2 seconds. Coloration: Skin is jaundiced. Findings: No rash. Neurological:      Mental Status: She is alert and oriented to person, place, and time. Cranial Nerves: No cranial nerve deficit. Sensory: No sensory deficit. Coordination: Coordination normal.   Psychiatric:         Mood and Affect: Mood normal.         Behavior: Behavior normal.         Thought Content: Thought content normal.         Judgment: Judgment normal.           DIAGNOSTIC RESULTS     RADIOLOGY:   Non-plain film images such as CT, Ultrasound and MRI are read by the radiologist. Plain radiographic images are visualized and preliminarilyinterpreted by Kayley Marinelli DO with the below findings:      Interpretation per the Radiologist below, if available at the time of this note:    VL Extremity Venous Left   Final Result      XR HIP 2-3 VW W PELVIS LEFT   Final Result   Osteopenia. No acute osseous abnormality identified. Signed by Dr Pily Palm on 2/24/2021 10:59 AM      XR KNEE LEFT (1-2 VIEWS)   Final Result   No acute bony abnormality identified. Tricompartmental   osteoarthritis.    Signed by Dr Pily Palm on 2/24/2021 10:57 AM          LABS:  Labs Reviewed   CBC WITH AUTO DIFFERENTIAL - Abnormal; Notable for the following components:       Result Value    RBC 2.77 (*)     Hemoglobin 8.6 (*)     Hematocrit 26.7 (*)     MCHC 32.2 (*)     Neutrophils % 83.6 (*)     Lymphocytes % 7.2 (*)     Neutrophils Absolute 9.0 (*)     Lymphocytes Absolute 0.8 (*)     All other components within normal limits   COMPREHENSIVE METABOLIC PANEL W/ REFLEX TO MG FOR LOW K - Abnormal; Notable for the following components:    Sodium 121 (*)     Potassium reflex Magnesium 6.0 (*)     Chloride 79 (*)     CO2 32 (*)     Glucose 721 (*)     BUN 56 (*)     CREATININE 3.5 (*)     GFR Non- 14 (*)     GFR African American 17 (*)     Alkaline Phosphatase 119 (*)     All other components within normal limits    Narrative:     CALL  Randy FULLER tel. ,  Chemistry results called to and read back by aSmantha Lin in ED, 02/24/2021  12:35, by [address] - Abnormal; Notable for the following components:    Pro-BNP 35,000 (*)     All other components within normal limits   POCT GLUCOSE - Abnormal; Notable for the following components:    POC Glucose 548 (*)     All other components within normal limits   POCT GLUCOSE - Abnormal; Notable for the following components:    POC Glucose 271 (*)     All other components within normal limits   RAPID STREP SCREEN   COVID-19, RAPID    Narrative:     ORDER WAS CANCELLED 02/24/2021 14:08, wrong order. CULTURE, BETA STREP CONFIRM PLATES   PROTIME-INR   APTT   BASIC METABOLIC PANEL W/ REFLEX TO MG FOR LOW K   POCT GLUCOSE   POCT GLUCOSE   POCT GLUCOSE   POCT GLUCOSE       All other labs were within normal range or notreturned as of this dictation. RE-ASSESSMENT        EMERGENCY DEPARTMENT COURSE and DIFFERENTIAL DIAGNOSIS/MDM:   Vitals:    Vitals:    02/24/21 1328 02/24/21 1403 02/24/21 1438 02/24/21 1533   BP: (!) 176/77 (!) 172/73  (!) 180/78   Pulse: 71 74  75   Resp: 17 18 17 16   Temp:       TempSrc:       SpO2: 95% 98% 98% 92%   Weight:       Height:           MDM  Patient has hyperkalemia critical plan will be to treat this she also has a dialysis history that will need nephrology consult my attending is spoken with nephrology on-call in that regard. Dr Dayan Chou is on call today. Plan will be to admit to hospitalist services. No acute findings for DVT today which is why she came in likely baker cysts base on severe OA in knee unfortunately she is critical levels on potassium with elevated. Sodium bolus erroneously started based on initial encounter with nausea chronic vomiting and dehydration concerns. PROCEDURES:    Procedures      FINAL IMPRESSION      1. Primary osteoarthritis of left hip    2. Primary osteoarthritis of left knee    3. Hyperkalemia    4. Hyperglycemia    5.  Hyponatremia          DISPOSITION/PLAN   DISPOSITION Admitted 02/24/2021 03:04:41 PM      PATIENT REFERRED TO:  No follow-up provider specified.     DISCHARGE MEDICATIONS:  Current Discharge Medication List          (Please note that portions of this note were completed with a voice recognition program.  Efforts were made to edit the dictations but occasionallywords are mis-transcribed.)    Bety Joshi [address] [place] [address]  02/24/21 2596

## 2021-02-24 NOTE — PROGRESS NOTES
NEUROSURGERY FOLLOW UP      Chief Complaint:   Chief Complaint   Patient presents with    Follow-up     back pain. Interval Update: Unplanned follow up for worsening left hip and leg pain. She states she cannot bear weight on her left leg. She also complains of a large palpable lump in her left posterior thigh. She complains of nausea and poor appetite and has an emesis bag with her. She was transported into the exam room in a wheelchair because she is unable to walk. She states she discussed this with her PCP and a DVT scan was recommended but has not yet been performed. She states the pain begins in her left buttock and involves the whole left leg. She cannot flex her hip or extend her knee without severe leg pain. At her last visit, she was referred to pain management for a left-sided SI joint injection as well as physical therapy but the injection could not be performed because she was on antibiotics for an infection in her breast.  She has not yet started physical therapy either. HPI: The patient is a 45 y.o. female who presents as a referral from the [facility] ED for neurosurgical evaluation of left-sided lower back, hip and leg pain. She awoke with the pain several days after [place] but cannot recall an inciting injury or event. She describes pain mostly in her left sacroiliac region that radiates to her right lateral thigh. She also describes numbness in her feet, though this is chronic for her and she does have a history of complicated diabetes, neuropathy and dialysis-dependent renal failure. Prior to this, she denies any significant back pain. She is now forced to walk with a cane which she has not done previously. She is currently attempting to control her pain with ibuprofen and pain pills that were left over from a previous dental surgery. She has not been prescribed any steroids. She has not done any physical therapy, seen pain management or had injections.   When asked to localize the pain, she points to the left SI joint. ROS:    Constitutional: Negative. HENT: Negative. Eyes: Negative. Respiratory: Negative. Cardiovascular: Negative. Gastrointestinal: Negative. Genitourinary: Negative. Musculoskeletal: Positive for back pain and joint pain. Negative for neck pain. Skin: Negative. Neurological: Positive for weakness. Endo/Heme/Allergies: Negative. Psychiatric/Behavioral: Negative.          Review of systems was obtained by the medical assistant and reviewed by myself. Objective:    BP (!) 147/74   Pulse 66   Ht 5' 5\" (1.651 m)   Wt 140 lb (63.5 kg)   BMI 23.30 kg/m²         Physical Exam:    General: alert, cooperative, crying, mild distress  Cardiorespiratory: unlabored breathing      Neurologic Exam:    Mental Status: Alert, oriented, thought content appropriate  Cranial Nerves: PERRL, EOMI, symmetric facies, tongue midline  Motor: 5/5 BUE, RLE, pain limited movement of all muscles in RLE, 3-4/5  Somatosensory: normal light touch sensation        Assessment and Plan:  46 y/o F returns for unplanned follow up complaining of swelling and severe pain in her left leg. She does have palpable asymmetry in the left posterior thigh of unclear significance, and her leg is quite tender to palpation and she has severe pain with movement. The etiology of her pain is unclear, but given the possibility of infection and DVT, the severity of her pain and her inability to ambulate, I have recommended that she be evaluated in the ED. It does not appear that her pain is related to SI joint dysfunction or is spinal in origin so she may need to be evaluated by other specialists pending imaging and testing results.         Electronically signed by Mike Flores MD on 2/24/2021 at 9:27 AM

## 2021-02-24 NOTE — PROGRESS NOTES
Vascular preliminary report:  Left venous duplex scan  No evidence of DVT, SVT, or reflux noted at this time. Technically limited study due to edema and patient movement. Final report pending.

## 2021-02-24 NOTE — ED PROVIDER NOTES
Attending Supervisory Note/Shared Visit   I have personally performed a face to face diagnostic evaluation on this patient. I have reviewed the mid-levels findings and agree. EKG: Normal sinus rhythm. ST elevation anteriorly which looks consistent with repolarization and looks similar to prior EKG. T waves are little bigger today than prior. Borderline T wave change aVL. I do not see evidence of acute ischemia but T waves are little more peaked in some leads and QRS might be slightly widened. Briefly, patient's 25-year-old female presents complaining of left lower extremity pain from her buttock down to her calf for over a month. Sent here for further evaluation by Dr. Kalani Chau, neurosurgeon, who has been seeing her for what was thought to be sciatica. On exam patient's only complaint is pain in her left leg. Said she did vomit a few times from the pain but no abdominal pain. Just says that the left leg hurts and has been hurting for over a month now. No real focal tenderness anywhere in the left leg. Normal range of motion of the joints. Neurovascular intact distally. Palpable pulse in the foot. Normal cap refill. Has a small area of soft tissue swelling in the distal posterior thigh. Uncertain exactly what this is. X-ray showed arthritis. Vascular studies did not show evidence of DVT. Do not know if this might be a Baker's cyst but will need further evaluation. Patient is nontoxic on exam.  Lungs clear. Regular rate and rhythm on cardiac auscultation. Abdomen soft nontender. Moving all 4 extremities equally. Patient has history of renal failure and is on dialysis. Before I had seen the patient PA had ordered fluids because of her vomiting. She was given an entire liter fluids which is probably more than she needed but she seems to be tolerating it fine. No shortness of breath. Not hypoxic. Lungs clear. Looks like with her labs she will need dialysis today.   iMchael Even that she had

## 2021-02-24 NOTE — H&P
[facility] Medicine H&P    Patient:  Randy Silveira  MRN: [id number]    Consulting Physician: Moncho Blanca MD  Reason for Consult: Medical Management  Primacy Care Physician: ROMINA Martin    HISTORY OF PRESENT ILLNESS:   The patient is a 45 y.o. female was seen this morning in neurosurgery clinic. She being evaluated for left hip and leg pain. She has been referred to pain management for possible SI joint injection. She was complaining of a large palpable lump on her left posterior thigh. There is concern that she could have a DVT. She was sent over to the emergency department for evaluation. Venous scan did not show evidence of a DVT, however, blood work shows a blood glucose of 700 and a potassium of 6. She does have end-stage renal disease and is on maintenance dialysis. Initially it was recommended that she go to the ICU, however, she is resting comfortably on my initial assessment. I feel she will be stable to go to third floor for dialysis and then to the third floor for observation.     Past Medical History:        Diagnosis Date    Arthritis     Chronic kidney disease, stage 5, kidney failure (HCC)     Closed fracture of right shoulder girdle, with routine healing, subsequent encounter     DM (diabetes mellitus) type II controlled with renal manifestation ([facility] [address] 06/1993    Gastroparesis     GERD (gastroesophageal reflux disease)     Hemodialysis patient ([facility] [address]     dialysis on tues, thur, and sat at [facility]    Hypertension     Hypothyroid     Nasal congestion     recent    Noncompliance with medications     Palliative care patient 10/08/2019    Restless leg syndrome        Past Surgical History:        Procedure Laterality Date    BREAST SURGERY Left 2008    infected milk gland removed    CHOLECYSTECTOMY, LAPAROSCOPIC      2008    COLONOSCOPY Left 9/17/2020    Dr Alin Carr hepatic flexure-Severe tortuosity with severe spasming, 1 yr recall    DIALYSIS FISTULA CREATION Left 1/25/2019    REVISION LEFT UPPER EXTREMITY AV ACCESS WITH LEFT BRACHIAL ARTERY TO LEFT AXILLARY VEIN WITH  INTERPOSITIONAL ARTEGRAFT   performed by Inocente Reese MD at [address]  2012    [address] Right 1/25/2019    INSERTION OF RIGHT INTERNAL JUGULAR HEMODIALYSIS CATHETER performed by Inocente Reese MD at [address] [facility]  08/17/2018    1.  Moderately increased stainable iron identified by special stain in Kupffer cells and occasional hepatocytes. Negative for evidence of significant portal or lobular inflammation. Negative for evidence of significant fibrosis    [place] COLONOSCOPY W/BIOPSY SINGLE/MULTIPLE N/A 10/20/2017    Dr Nasim Valentine prep-Tubular AP (-) dysplasia x 2--3 yr recall    [place] EGD TRANSORAL BIOPSY SINGLE/MULTIPLE N/A 12/27/2016    Dr Wilhelmina Councilman EGD TRANSORAL BIOPSY SINGLE/MULTIPLE N/A 10/20/2017    Dr Liliya Syed (-)   [address] [facility] [address] [facility] VASCULAR SURGERY Left 2016    fistula by Dr Cristina Chandra at [address]  10/02/2017    [facility]. Left upper fistulograms/venograms, balloon angioplasty cephalic vein arch 69O07 conquest.    VASCULAR SURGERY  08/2018    FISTULOGRAM    VASCULAR SURGERY  10/29/2018    [facility]. Left upper fistulograms/venograms    VASCULAR SURGERY  12/19/2018    [facility]. Arch aortogram,left upper arteriograms.  VASCULAR SURGERY  03/06/2019    [facility]. Removal of tunneled dialyis catheter right internal jugular vein.  VASCULAR SURGERY  08/28/2019    [facility]. Left upper extremity fistulogram including venography to the superior vena cava. Balloon angioplasty left subclavian vein with 10mm x 40mm conquest balloon. Balloon angioplasty mid/proximal upper arm cephalic vein with 26VV x 40mm conquest balloon. Venograms after balloon angioplasty. Stent left mid/proximal upper arm cephalic vein stenosis fluency 10mm x 60mm self-expanding covered stent. Balloon     VASCULAR SURGERY  08/28/2019    cont angioplasty stent with 10mm x 40mm conquest balloon. Completion venograms left upper extremity. Medications: Scheduled Meds:  Continuous Infusions:   insulin       PRN Meds:. Allergies:  Penicillins, Lamisil advanced [terbinafine], Stadol [butorphanol], and Reglan [metoclopramide]    Social History:   TOBACCO:   reports that she has been smoking cigarettes. She has a 16.50 pack-year smoking history. She has never used smokeless tobacco.  ETOH:   reports no history of alcohol use. Family History:       Problem Relation Age of Onset    Colon Cancer Mother          at 63    Colon Polyps Mother     Lung Cancer Father          at 79    Colon Polyps Father     Stomach Cancer Sister     Breast Cancer Sister 27    Depression Daughter 25        committed suicide    Liver Cancer Neg Hx     Liver Disease Neg Hx     Esophageal Cancer Neg Hx     Rectal Cancer Neg Hx        ROS:  Review of Systems   Constitutional: Positive for activity change. Negative for fatigue. HENT: Negative for rhinorrhea and voice change. Eyes: Negative for visual disturbance. Respiratory: Negative for cough and shortness of breath. Cardiovascular: Negative for chest pain and leg swelling. Gastrointestinal: Negative for constipation, diarrhea, nausea and vomiting. Endocrine: Negative for polyuria. Genitourinary: Negative for difficulty urinating and dysuria. Musculoskeletal: Positive for arthralgias, back pain and gait problem. Skin: Negative for color change. Allergic/Immunologic: Negative for immunocompromised state. Neurological: Positive for weakness. Negative for dizziness and headaches. Psychiatric/Behavioral: Negative for confusion. Physical Exam:    Vitals: BP (!) 176/77   Pulse 71   Temp 97.9 °F (36.6 °C) (Oral)   Resp 17   Ht 5' 5\" (1.651 m)   Wt 140 lb (63.5 kg)   SpO2 98%   BMI 23.30 kg/m²     Physical Exam  Vitals signs and nursing note reviewed. Constitutional:       Appearance: She is ill-appearing. HENT:      Head: Normocephalic and atraumatic. Right Ear: External ear normal.      Left Ear: External ear normal.      Nose: Nose normal.      Mouth/Throat:      Mouth: Mucous membranes are moist.   Eyes:      Conjunctiva/sclera: Conjunctivae normal.      Pupils: Pupils are equal, round, and reactive to light. Neck:      Musculoskeletal: Neck supple. No neck rigidity or muscular tenderness. Cardiovascular:      Rate and Rhythm: Normal rate and regular rhythm. Heart sounds: Normal heart sounds. Pulmonary:      Effort: Pulmonary effort is normal.      Breath sounds: Normal breath sounds. Abdominal:      General: Abdomen is flat. Palpations: Abdomen is soft. Skin:     General: Skin is warm and dry. Neurological:      Mental Status: She is alert and oriented to person, place, and time. Psychiatric:         Mood and Affect: Mood normal.         CBC:   Recent Labs     02/24/21  1135   WBC 10.8   HGB 8.6*        BMP:    Recent Labs     02/24/21  1135   *   K 6.0*   CL 79*   CO2 32*   BUN 56*   CREATININE 3.5*   GLUCOSE 721*     Hepatic:   Recent Labs     02/24/21  1135   AST 14   ALT 11   BILITOT 0.4   ALKPHOS 119*     Troponin: No results for input(s): TROPONINI in the last 72 hours. BNP: No results for input(s): BNP in the last 72 hours. Lipids: No results for input(s): CHOL, HDL in the last 72 hours. Invalid input(s): LDLCALCU  ABGs:   Lab Results   Component Value Date    PHART 7.570 06/24/2020    PO2ART 50.0 06/24/2020    BHC3TLR 36.0 06/24/2020     INR:   Recent Labs     02/24/21  1135   INR 1.01     -----------------------------------------------------------------  Xr Knee Left (1-2 Views)    Result Date: 2/24/2021  EXAMINATION: XR KNEE LEFT (1-2 VIEWS) 2/24/2021 10:56 AM HISTORY: Gradual left knee pain COMPARISON: None FINDINGS: Medial and lateral compartment narrowing are evident. Patellofemoral degenerative spurring is noted.  There is no evidence of acute fracture or dislocation. Patient appears osteopenic. No lytic or sclerotic lesions are identified. No joint effusion is seen. Vascular calcifications are present. No acute bony abnormality identified. Tricompartmental osteoarthritis. Signed by Dr Radha Harris on 2/24/2021 10:57 AM    Vl Extremity Venous Left    Result Date: 2/24/2021  Vascular Lower Extremities DVT Study Procedure  Demographics   Patient Name     Tirso OLIVEIRA Age                   45   Patient Number   [id number]         Gender                Female   Visit Number     [id number]      Interpreting          Brittany Murray MD                                  Physician   Date of Birth    1970     Referring Physician   Goyo Murguia   Accession Number [id number]     [facility]  Procedure Type of Study:   Veins:Lower Extremities DVT Study, VL EXTREMITY VENOUS DUPLEX LEFT. Indications for Study:Edema, left lower extremity. Allergies   - Penicillins.   - Stadol.   - Other allergy:(Lamisil Advanced). Technical Quality:Limited visualization due to edema. Impression   There is no evidence of deep venous thrombosis (DVT) in the left lower  extremity(ies). There is no evidence of superficial thrombophlebitis of the left lower  extremity(ies). The exam is technically limited due to edema ; therefore the left tibial  veins were not well visualized. Signature   ----------------------------------------------------------------  Electronically signed by Brittany Murray MD(Interpreting  physician) on 02/24/2021 02:12 PM  ----------------------------------------------------------------  Velocities are measured in cm/s ; Diameters are measured in mm Right Lower Extremities DVT Study Measurements Right 2D Measurements +------------------------------------+----------+---------------+----------+ ! Location                            ! Visualized! Compressibility! Thrombosis! +------------------------------------+----------+---------------+----------+ ! Sapheno Femoral Junction            ! Yes       ! Yes            ! None      ! +------------------------------------+----------+---------------+----------+ ! Common Femoral                      !Yes       ! Yes            ! None      ! +------------------------------------+----------+---------------+----------+ Left Lower Extremities DVT Study Measurements Left 2D Measurements +------------------------------------+----------+---------------+----------+ ! Location                            ! Visualized! Compressibility! Thrombosis! +------------------------------------+----------+---------------+----------+ ! Sapheno Femoral Junction            ! Yes       ! Yes            ! None      ! +------------------------------------+----------+---------------+----------+ ! Common Femoral                      !Yes       ! Yes            ! None      ! +------------------------------------+----------+---------------+----------+ ! Prox Femoral                        !Yes       ! Yes            ! None      ! +------------------------------------+----------+---------------+----------+ ! Mid Femoral                         !Yes       ! Yes            ! None      ! +------------------------------------+----------+---------------+----------+ ! Dist Femoral                        !Yes       ! Yes            ! None      ! +------------------------------------+----------+---------------+----------+ ! Deep Femoral                        !Yes       ! Yes            ! None      ! +------------------------------------+----------+---------------+----------+ ! Popliteal                           !Yes       ! Yes            ! None      ! +------------------------------------+----------+---------------+----------+ ! SSV                                 ! Yes       ! Yes            ! None      ! +------------------------------------+----------+---------------+----------+ ! Gastroc !Yes       !Yes            ! None      ! +------------------------------------+----------+---------------+----------+ ! PTV                                 ! Partial   !Yes            ! None      ! +------------------------------------+----------+---------------+----------+ ! GSV                                 ! Yes       ! Yes            ! None      ! +------------------------------------+----------+---------------+----------+ ! ATV                                 ! Partial   !Yes            ! None      ! +------------------------------------+----------+---------------+----------+ ! Peroneal                            !Partial   !Yes            ! None      ! +------------------------------------+----------+---------------+----------+    [facility] Digital Diagnostic W Or Wo Cad Bilateral    Result Date: 2/4/2021  EXAMINATION: [facility] DIGITAL DIAGNOSTIC W OR WO CAD BILATERAL 2/4/2021 12:27 PM HISTORY: 48-year-old female complaining of 3 weeks of bloody right nipple discharge and right breast pain. Report: Today's examination consists of craniocaudal and oblique digital views of both breasts, with 3-D fidel synthesis. In addition, targeted ultrasound of the right breast was performed in the retroareolar region. COMPARISON: Bilateral screening mammograms 10/22/2019 and 11/3/2016. There are scattered parenchymal densities, in a Pattern B parenchymal pattern. No dominant mass or parenchymal distortion is identified. There are no suspicious calcifications, skin thickening or nipple retraction. Targeted ultrasound of the right breast today demonstrates multiple slightly dilated ducts in the right retroareolar region, which contain debris but no discrete nodule or mass. This may be related to mastitis and clinical correlation is recommended. Impression: 1. Today's mammograms are stable and unremarkable.  Targeted ultrasound of the right retroareolar breast demonstrates multiple mildly dilated ducts, which contains debris but no discrete nodule or mass. This may represent acute mastitis. Clinical correlation is recommended. Consider repeat ultrasound in 3 months. BI-RADS Category 3, probably benign. 1. A negative x-ray report should not delay biopsy if a dominant or clinically suspicious mass is present. Four to eight percent of cancers are not identified by x-ray. 2. A negative report reinforce clinical impression. 3. Adenosis and dense breasts may obscure an underlying neoplasm. 4. False positive reports average eight percent nationally. 5. The mammograms were evaluated using computer aided detection. Signed by Dr Orlin Bradford on 2/4/2021 12:45 PM    Us Breast Limited Right    Result Date: 2/4/2021  EXAMINATION: US BREAST LIMITED RIGHT 2/4/2021 12:45 PM HISTORY: Right breast pain with bloody nipple discharge. COMPARISON: Diagnostic mammograms today, screening mammograms 10/22/2019 and 11/3/2016. Targeted ultrasound of the right breast was performed in the retroareolar region and demonstrates multiple slightly dilated ducts, which contain isoechoic debris but no discrete nodule or mass. This may be related to acute mastitis and clinical correlation is recommended. Diagnostic mammograms today are stable compared with prior mammograms with no evidence of malignancy. Impression: 1. Today's mammograms are stable and unremarkable. Targeted ultrasound of the right retroareolar breast demonstrates multiple mildly dilated ducts, which contains debris but no discrete nodule or mass. This may represent acute mastitis. Clinical correlation is recommended. Consider repeat ultrasound in 3 months. BI-RADS Category 3, probably benign. Signed by Dr Orlin Bradford on 2/4/2021 12:47 PM    Xr Hip 2-3 Vw W Pelvis Left    Result Date: 2/24/2021  EXAMINATION: XR HIP 2-3 VW W PELVIS LEFT 2/24/2021 10:57 AM HISTORY: Pain with rotation COMPARISON: 1/3/2021 FINDINGS: The patient appears osteopenic.  The femoral heads appear appropriately located in the acetabula bilaterally. No lytic or sclerotic lesions are identified. No soft tissue abnormality is seen. Vascular calcifications are noted in the pelvis. Osteopenia. No acute osseous abnormality identified. Signed by Dr Sammy Velázquez on 2/24/2021 10:59 AM        Assessment and Plan     Hyperkalemia. Consultation with nephrology. Hemodialysis. Hyperglycemia. Intensify Lantus regimen. Place in observation.       [facility], DO

## 2021-02-24 NOTE — PROGRESS NOTES
Pt arrived to  A&O and placed on ICU monitors. Dr. Michael Harry at bedside, states to d/c insulin gtt and patient will go to 3rd floor dialysis. Transport en route to get patient. Pt to go to room 307 after dialysis.      Electronically signed by Zack Ledbetter RN on 2/24/2021 at 4:56 PM

## 2021-02-25 VITALS
DIASTOLIC BLOOD PRESSURE: 61 MMHG | OXYGEN SATURATION: 99 % | HEART RATE: 69 BPM | SYSTOLIC BLOOD PRESSURE: 107 MMHG | HEIGHT: 65 IN | WEIGHT: 143 LBS | BODY MASS INDEX: 23.82 KG/M2 | TEMPERATURE: 98.3 F | RESPIRATION RATE: 20 BRPM

## 2021-02-25 PROBLEM — E87.5 HYPERKALEMIA: Status: RESOLVED | Noted: 2017-05-23 | Resolved: 2021-02-25

## 2021-02-25 LAB
ANION GAP SERPL CALCULATED.3IONS-SCNC: 13 MMOL/L (ref 7–19)
BUN BLDV-MCNC: 30 MG/DL (ref 6–20)
CALCIUM SERPL-MCNC: 8.7 MG/DL (ref 8.6–10)
CHLORIDE BLD-SCNC: 92 MMOL/L (ref 98–111)
CO2: 28 MMOL/L (ref 22–29)
CREAT SERPL-MCNC: 2.8 MG/DL (ref 0.5–0.9)
GFR AFRICAN AMERICAN: 21
GFR NON-AFRICAN AMERICAN: 18
GLUCOSE BLD-MCNC: 109 MG/DL (ref 70–99)
GLUCOSE BLD-MCNC: 357 MG/DL (ref 70–99)
GLUCOSE BLD-MCNC: 463 MG/DL (ref 74–109)
PERFORMED ON: ABNORMAL
PERFORMED ON: ABNORMAL
POTASSIUM REFLEX MAGNESIUM: 4.6 MMOL/L (ref 3.5–5)
SODIUM BLD-SCNC: 133 MMOL/L (ref 136–145)

## 2021-02-25 PROCEDURE — 82947 ASSAY GLUCOSE BLOOD QUANT: CPT

## 2021-02-25 PROCEDURE — 8010000000 HC HEMODIALYSIS ACUTE INPT

## 2021-02-25 PROCEDURE — 6370000000 HC RX 637 (ALT 250 FOR IP): Performed by: HOSPITALIST

## 2021-02-25 PROCEDURE — 36415 COLL VENOUS BLD VENIPUNCTURE: CPT

## 2021-02-25 PROCEDURE — G0378 HOSPITAL OBSERVATION PER HR: HCPCS

## 2021-02-25 PROCEDURE — 94640 AIRWAY INHALATION TREATMENT: CPT

## 2021-02-25 PROCEDURE — 80048 BASIC METABOLIC PNL TOTAL CA: CPT

## 2021-02-25 PROCEDURE — 6370000000 HC RX 637 (ALT 250 FOR IP): Performed by: INTERNAL MEDICINE

## 2021-02-25 RX ORDER — INSULIN GLARGINE 100 [IU]/ML
40 INJECTION, SOLUTION SUBCUTANEOUS DAILY
Qty: 5 PEN | Refills: 3 | Status: ON HOLD | OUTPATIENT
Start: 2021-02-25 | End: 2021-05-16 | Stop reason: SDUPTHER

## 2021-02-25 RX ORDER — HYDROCODONE BITARTRATE AND ACETAMINOPHEN 7.5; 325 MG/1; MG/1
1 TABLET ORAL EVERY 6 HOURS PRN
Qty: 12 TABLET | Refills: 0 | Status: SHIPPED | OUTPATIENT
Start: 2021-02-25 | End: 2021-02-28

## 2021-02-25 RX ADMIN — ACETAMINOPHEN 650 MG: 325 TABLET ORAL at 12:24

## 2021-02-25 RX ADMIN — LISINOPRIL 20 MG: 20 TABLET ORAL at 12:14

## 2021-02-25 RX ADMIN — CLONIDINE HYDROCHLORIDE 0.3 MG: 0.3 TABLET ORAL at 06:01

## 2021-02-25 RX ADMIN — NIFEDIPINE 60 MG: 60 TABLET, FILM COATED, EXTENDED RELEASE ORAL at 12:14

## 2021-02-25 RX ADMIN — IPRATROPIUM BROMIDE AND ALBUTEROL SULFATE 1 AMPULE: 2.5; .5 SOLUTION RESPIRATORY (INHALATION) at 11:51

## 2021-02-25 RX ADMIN — LEVOTHYROXINE SODIUM 150 MCG: 75 TABLET ORAL at 06:00

## 2021-02-25 RX ADMIN — HYDROCHLOROTHIAZIDE 50 MG: 25 TABLET ORAL at 12:14

## 2021-02-25 RX ADMIN — STANDARDIZED SENNA CONCENTRATE 8.6 MG: 8.6 TABLET ORAL at 12:15

## 2021-02-25 RX ADMIN — ISOSORBIDE MONONITRATE 120 MG: 60 TABLET, EXTENDED RELEASE ORAL at 12:15

## 2021-02-25 RX ADMIN — LINACLOTIDE 290 MCG: 145 CAPSULE, GELATIN COATED ORAL at 06:01

## 2021-02-25 RX ADMIN — ACETAMINOPHEN 650 MG: 325 TABLET ORAL at 06:01

## 2021-02-25 RX ADMIN — SEVELAMER CARBONATE 800 MG: 800 TABLET, FILM COATED ORAL at 12:14

## 2021-02-25 RX ADMIN — DULOXETINE HYDROCHLORIDE 60 MG: 30 CAPSULE, DELAYED RELEASE ORAL at 12:14

## 2021-02-25 RX ADMIN — METOPROLOL SUCCINATE 100 MG: 50 TABLET, EXTENDED RELEASE ORAL at 06:01

## 2021-02-25 RX ADMIN — LEVETIRACETAM 500 MG: 500 TABLET ORAL at 12:15

## 2021-02-25 RX ADMIN — INSULIN LISPRO 10 UNITS: 100 INJECTION, SOLUTION INTRAVENOUS; SUBCUTANEOUS at 12:25

## 2021-02-25 RX ADMIN — IPRATROPIUM BROMIDE AND ALBUTEROL SULFATE 1 AMPULE: 2.5; .5 SOLUTION RESPIRATORY (INHALATION) at 07:05

## 2021-02-25 RX ADMIN — ATORVASTATIN CALCIUM 40 MG: 40 TABLET, FILM COATED ORAL at 12:14

## 2021-02-25 RX ADMIN — HYDRALAZINE HYDROCHLORIDE 75 MG: 25 TABLET, FILM COATED ORAL at 13:17

## 2021-02-25 RX ADMIN — HYDRALAZINE HYDROCHLORIDE 75 MG: 25 TABLET, FILM COATED ORAL at 06:00

## 2021-02-25 ASSESSMENT — PAIN DESCRIPTION - PAIN TYPE: TYPE: ACUTE PAIN

## 2021-02-25 ASSESSMENT — PAIN DESCRIPTION - DESCRIPTORS: DESCRIPTORS: ACHING

## 2021-02-25 ASSESSMENT — PAIN SCALES - GENERAL
PAINLEVEL_OUTOF10: 3
PAINLEVEL_OUTOF10: 3

## 2021-02-25 ASSESSMENT — PAIN DESCRIPTION - ONSET: ONSET: ON-GOING

## 2021-02-25 ASSESSMENT — PAIN DESCRIPTION - FREQUENCY: FREQUENCY: CONTINUOUS

## 2021-02-25 NOTE — PLAN OF CARE
Problem: Fluid Volume:  Goal: Ability to achieve a balanced intake and output will improve  Description: Ability to achieve a balanced intake and output will improve  Outcome: Ongoing  Goal: Will show no signs or symptoms of fluid imbalance  Description: Will show no signs or symptoms of fluid imbalance  Outcome: Ongoing  Goal: Ability to maintain a balanced intake and output will improve  Description: Ability to maintain a balanced intake and output will improve  Outcome: Ongoing     Problem: Physical Regulation:  Goal: Ability to maintain clinical measurements within normal limits will improve  Description: Ability to maintain clinical measurements within normal limits will improve  Outcome: Ongoing  Goal: Will show no signs and symptoms of electrolyte imbalance  Description: Will show no signs and symptoms of electrolyte imbalance  Outcome: Ongoing  Goal: Complications related to the disease process, condition or treatment will be avoided or minimized  Description: Complications related to the disease process, condition or treatment will be avoided or minimized  Outcome: Ongoing  Goal: Diagnostic test results will improve  Description: Diagnostic test results will improve  Outcome: Ongoing     Problem: Tissue Perfusion - Renal, Altered:  Goal: Electrolytes within specified parameters  Description: Electrolytes within specified parameters  Outcome: Ongoing  Goal: Urine creatinine clearance will be within specified parameters  Description: Urine creatinine clearance will be within specified parameters  Outcome: Ongoing  Goal: Serum creatinine will be within specified parameters  Description: Serum creatinine will be within specified parameters  Outcome: Ongoing     Problem:  Activity:  Goal: Fatigue will decrease  Description: Fatigue will decrease  Outcome: Ongoing  Goal: Risk for activity intolerance will decrease  Description: Risk for activity intolerance will decrease  Outcome: Ongoing  Goal: Ability to tolerate increased activity will improve  Description: Ability to tolerate increased activity will improve  Outcome: Ongoing     Problem: Coping:  Goal: Ability to cope will improve  Description: Ability to cope will improve  Outcome: Ongoing  Goal: Ability to adjust to condition or change in health will improve  Description: Ability to adjust to condition or change in health will improve  Outcome: Ongoing  Goal: Ability to identify and develop effective coping behavior will improve  Description: Ability to identify and develop effective coping behavior will improve  Outcome: Ongoing     Problem: Health Behavior:  Goal: Ability to manage health-related needs will improve  Description: Ability to manage health-related needs will improve  Outcome: Ongoing  Goal: Identification of resources available to assist in meeting health care needs will improve  Description: Identification of resources available to assist in meeting health care needs will improve  Outcome: Ongoing  Goal: Ability to identify and utilize available resources and services will improve  Description: Ability to identify and utilize available resources and services will improve  Outcome: Ongoing  Goal: Identification of resources available to assist in meeting health care needs will improve  Description: Identification of resources available to assist in meeting health care needs will improve  Outcome: Ongoing     Problem: Nutritional:  Goal: Ability to identify appropriate dietary choices will improve  Description: Ability to identify appropriate dietary choices will improve  Outcome: Ongoing  Goal: Maintenance of adequate nutrition will improve  Description: Maintenance of adequate nutrition will improve  Outcome: Ongoing  Goal: Progress toward achieving an optimal weight will improve  Description: Progress toward achieving an optimal weight will improve  Outcome: Ongoing  Goal: Ability to identify appropriate dietary choices will improve  Description: Ability to identify appropriate dietary choices will improve  Outcome: Ongoing     Problem: Sensory:  Goal: General experience of comfort will improve  Description: General experience of comfort will improve  Outcome: Ongoing     Problem: Skin Integrity:  Goal: Status of oral mucous membranes will improve  Description: Status of oral mucous membranes will improve  Outcome: Ongoing  Goal: Skin integrity will be maintained  Description: Skin integrity will be maintained  Outcome: Ongoing  Goal: Risk for impaired skin integrity will decrease  Description: Risk for impaired skin integrity will decrease  Outcome: Ongoing     Problem: SAFETY  Goal: Free from accidental physical injury  Outcome: Ongoing  Goal: Free from intentional harm  Outcome: Ongoing     Problem: DAILY CARE  Goal: Daily care needs are met  Outcome: Ongoing     Problem: PAIN  Goal: Patient's pain/discomfort is manageable  Outcome: Ongoing     Problem: SKIN INTEGRITY  Goal: Skin integrity is maintained or improved  Outcome: Ongoing     Problem: KNOWLEDGE DEFICIT  Goal: Patient/S.O. demonstrates understanding of disease process, treatment plan, medications, and discharge instructions.   Outcome: Ongoing     Problem: DISCHARGE BARRIERS  Goal: Patient's continuum of care needs are met  Outcome: Ongoing     Problem: Discharge Planning:  Goal: Discharged to appropriate level of care  Description: Discharged to appropriate level of care  Outcome: Ongoing     Problem: Falls - Risk of:  Goal: Will remain free from falls  Description: Will remain free from falls  Outcome: Ongoing  Goal: Absence of physical injury  Description: Absence of physical injury  Outcome: Ongoing     Problem: Metabolic:  Goal: Ability to maintain appropriate glucose levels will improve  Description: Ability to maintain appropriate glucose levels will improve  Outcome: Ongoing     Problem: Tissue Perfusion:  Goal: Adequacy of tissue perfusion will improve  Description: Adequacy of tissue perfusion will improve  Outcome: Ongoing     Problem: Serum Glucose Level - Abnormal:  Goal: Ability to maintain appropriate glucose levels will improve  Description: Ability to maintain appropriate glucose levels will improve  Outcome: Ongoing     Problem: Sensory Perception - Impaired:  Goal: Ability to maintain a stable neurologic state will improve  Description: Ability to maintain a stable neurologic state will improve  Outcome: Ongoing     Problem: Pain:  Description: Pain management should include both nonpharmacologic and pharmacologic interventions.   Goal: Pain level will decrease  Description: Pain level will decrease  Outcome: Ongoing  Goal: Control of acute pain  Description: Control of acute pain  Outcome: Ongoing  Goal: Control of chronic pain  Description: Control of chronic pain  Outcome: Ongoing     Problem: ABCDS Injury Assessment  Goal: Absence of physical injury  Outcome: Ongoing     Problem: Cardiac:  Goal: Complications related to the disease process, condition or treatment will be avoided or minimized  Description: Complications related to the disease process, condition or treatment will be avoided or minimized  Outcome: Ongoing  Goal: Hemodynamic stability will improve  Description: Hemodynamic stability will improve  Outcome: Ongoing  Goal: Cerebral tissue perfusion will improve  Description: Cerebral tissue perfusion will improve  Outcome: Ongoing

## 2021-02-25 NOTE — DISCHARGE SUMMARY
Discharge Summary    Jose Guadalupe Billing  :  1970  MRN:  [id number]    Admit date:  2021  Discharge date: 2021     Admitting Physician:  Hodan Han DO    Advance Directive: Prior    Consults: nephrology    Primary Care Physician:  ROMINA Khalil    Discharge Diagnoses: Active Problems:    * No active hospital problems. *  Resolved Problems:    Hyperkalemia      Significant Diagnostic Studies:   Xr Knee Left (1-2 Views)    Result Date: 2021  EXAMINATION: XR KNEE LEFT (1-2 VIEWS) 2021 10:56 AM HISTORY: Gradual left knee pain COMPARISON: None FINDINGS: Medial and lateral compartment narrowing are evident. Patellofemoral degenerative spurring is noted. There is no evidence of acute fracture or dislocation. Patient appears osteopenic. No lytic or sclerotic lesions are identified. No joint effusion is seen. Vascular calcifications are present. No acute bony abnormality identified. Tricompartmental osteoarthritis. Signed by Dr Radha Houston on 2021 10:57 AM    Vl Extremity Venous Left    Result Date: 2021  Vascular Lower Extremities DVT Study Procedure  Demographics   Patient Name     Nader OLIVEIRA Age                   45   Patient Number   [id number]         Gender                Female   Visit Number     [id number]      Interpreting          Abida Sylvester MD                                  Physician   Date of Birth    1970     Referring Physician   Natalie Haskins   Accession Number [id number]     [address] [place] [address]  Procedure Type of Study:   Veins:Lower Extremities DVT Study, VL EXTREMITY VENOUS DUPLEX LEFT. Indications for Study:Edema, left lower extremity. Allergies   - Penicillins.   - Stadol.   - Other allergy:(Lamisil Advanced). Technical Quality:Limited visualization due to edema. Impression   There is no evidence of deep venous thrombosis (DVT) in the left lower  extremity(ies).   There is no evidence of superficial thrombophlebitis of the left lower  extremity(ies). The exam is technically limited due to edema ; therefore the left tibial  veins were not well visualized. Signature   ----------------------------------------------------------------  Electronically signed by Garret Ornelas MD(Interpreting  physician) on 02/24/2021 02:12 PM  ----------------------------------------------------------------  Velocities are measured in cm/s ; Diameters are measured in mm Right Lower Extremities DVT Study Measurements Right 2D Measurements +------------------------------------+----------+---------------+----------+ ! Location                            ! Visualized! Compressibility! Thrombosis! +------------------------------------+----------+---------------+----------+ ! Sapheno Femoral Junction            ! Yes       ! Yes            ! None      ! +------------------------------------+----------+---------------+----------+ ! Common Femoral                      !Yes       ! Yes            ! None      ! +------------------------------------+----------+---------------+----------+ Left Lower Extremities DVT Study Measurements Left 2D Measurements +------------------------------------+----------+---------------+----------+ ! Location                            ! Visualized! Compressibility! Thrombosis! +------------------------------------+----------+---------------+----------+ ! Sapheno Femoral Junction            ! Yes       ! Yes            ! None      ! +------------------------------------+----------+---------------+----------+ ! Common Femoral                      !Yes       ! Yes            ! None      ! +------------------------------------+----------+---------------+----------+ ! Prox Femoral                        !Yes       ! Yes            ! None      ! +------------------------------------+----------+---------------+----------+ ! Mid Femoral                         !Yes       ! Yes            ! None      ! +------------------------------------+----------+---------------+----------+ ! Dist Femoral                        !Yes       ! Yes            ! None      ! +------------------------------------+----------+---------------+----------+ ! Deep Femoral                        !Yes       ! Yes            ! None      ! +------------------------------------+----------+---------------+----------+ ! Popliteal                           !Yes       ! Yes            ! None      ! +------------------------------------+----------+---------------+----------+ ! SSV                                 ! Yes       ! Yes            ! None      ! +------------------------------------+----------+---------------+----------+ ! Gastroc                             ! Yes       ! Yes            ! None      ! +------------------------------------+----------+---------------+----------+ ! PTV                                 ! Partial   !Yes            ! None      ! +------------------------------------+----------+---------------+----------+ ! GSV                                 ! Yes       ! Yes            ! None      ! +------------------------------------+----------+---------------+----------+ ! ATV                                 ! Partial   !Yes            ! None      ! +------------------------------------+----------+---------------+----------+ ! Peroneal                            !Partial   !Yes            ! None      ! +------------------------------------+----------+---------------+----------+    Xr Hip 2-3 Vw W Pelvis Left    Result Date: 2/24/2021  EXAMINATION: XR HIP 2-3 VW W PELVIS LEFT 2/24/2021 10:57 AM HISTORY: Pain with rotation COMPARISON: 1/3/2021 FINDINGS: The patient appears osteopenic. The femoral heads appear appropriately located in the acetabula bilaterally. No lytic or sclerotic lesions are identified. No soft tissue abnormality is seen. Vascular calcifications are noted in the pelvis. Osteopenia. No acute osseous abnormality identified.  Signed by  Martha Cancer on 2/24/2021 10:59 AM      CBC:   Recent Labs     02/24/21  1135   WBC 10.8   HGB 8.6*        BMP:    Recent Labs     02/24/21  1135 02/25/21  0213   * 133*   K 6.0* 4.6   CL 79* 92*   CO2 32* 28   BUN 56* 30*   CREATININE 3.5* 2.8*   GLUCOSE 721* 463*     INR:   Recent Labs     02/24/21  1135   INR 1.01     Lipids: No results for input(s): CHOL, HDL in the last 72 hours. Invalid input(s): LDLCALCU  ABGs:No results for input(s): PHART, OLO9DYB, PO2ART, GEI1HJJ, BEART, HGBAE, X5OHXWUW, CARBOXHGBART, 02THERAPY in the last 72 hours. HgBA1c:  No results for input(s): LABA1C in the last 72 hours. Procedures: Hemodialysis  Hospital Course:  was admitted on 2/24 because of hyperkalemia. Consultation was obtained with nephrology. She underwent hemodialysis on 2/24, and 2/25. Her blood glucose and potassium was stabilized. She was complaining of left hip pain. This is chronic in nature. She has been referred to pain management for left SI joint injection. We will also make arrangements for her to have an outpatient evaluation with orthopedic surgery. She will be discharged after her hemodialysis on 2/25. Physical Exam:  Vital Signs: BP (!) 154/72   Pulse 73   Temp 98.5 °F (36.9 °C) (Temporal)   Resp 18   Ht 5' 5\" (1.651 m)   Wt 143 lb (64.9 kg)   SpO2 97%   BMI 23.80 kg/m²   General appearance:. Alert and Cooperative   HEENT: Normocephalic. Chest: clear to auscultation bilaterally without wheezes or rhonchi. Cardiac: Normal heart tones with regular rate and rhythm, S1, S2 normal. No murmurs, gallops, or rubs auscultated. Abdomen:soft, non-tender; normal bowel sounds, no masses, no organomegaly. Extremities: No clubbing or cyanosis. No peripheral edema. Peripheral pulses palpable. Neurologic: Grossly intact.     Discharge Medications:       Gisselle Velasquez   Home Medication Instructions VQQ:[id number]    Printed on:02/25/21 0818   Medication Information albuterol sulfate  (90 Base) MCG/ACT inhaler  Inhale 2 puffs into the lungs every 4 hours as needed             cloNIDine (CATAPRES) 0.3 MG tablet  Take 1 tablet by mouth every 8 hours             DULoxetine (CYMBALTA) 60 MG extended release capsule  Take 60 mg by mouth daily             Ergocalciferol (VITAMIN D2) 10 MCG (400 UNIT) TABS  Take 1.25 mg by mouth every 14 days             hydrALAZINE (APRESOLINE) 50 MG tablet  Take 1.5 tablets by mouth every 8 hours             hydroCHLOROthiazide (HYDRODIURIL) 25 MG tablet  Take 2 tablets by mouth daily             HYDROcodone-acetaminophen (NORCO) 7.5-325 MG per tablet  Take 1 tablet by mouth every 6 hours as needed for Pain for up to 3 days. Intended supply: 3 days.  Take lowest dose possible to manage pain             insulin aspart (NOVOLOG FLEXPEN) 100 UNIT/ML injection pen  Inject 5 Units into the skin 3 times daily              insulin glargine (LANTUS SOLOSTAR) 100 UNIT/ML injection pen  Inject 40 Units into the skin daily             isosorbide mononitrate (IMDUR) 120 MG extended release tablet  Take 1 tablet by mouth daily             levETIRAcetam (KEPPRA) 500 MG tablet  Take 1 tablet by mouth daily             levothyroxine (SYNTHROID) 150 MCG tablet  Take 150 mcg by mouth Daily             linaclotide (LINZESS) 290 MCG CAPS capsule  Take 1 capsule by mouth every morning (before breakfast)             lisinopril (PRINIVIL;ZESTRIL) 20 MG tablet  Take 1 tablet by mouth 2 times daily             metoprolol succinate (TOPROL XL) 100 MG extended release tablet  Take 1 tablet by mouth every morning (before breakfast)             nicotine (NICODERM CQ) 14 MG/24HR  Place 1 patch onto the skin daily for 14 days             NIFEdipine (ADALAT CC) 60 MG extended release tablet  Take 60 mg by mouth daily             ondansetron (ZOFRAN-ODT) 4 MG disintegrating tablet  Take 4 mg by mouth every 8 hours as needed for Nausea or Vomiting rOPINIRole (REQUIP) 2 MG tablet  Take 4 mg by mouth nightly Indications: Restless Leg Syndrome              senna (SENNA-LAX) 8.6 MG tablet  Take 1 tablet by mouth daily             sevelamer (RENVELA) 800 MG tablet  Take 1 tablet by mouth 3 times daily (with meals)             simvastatin (ZOCOR) 40 MG tablet  Take 40 mg by mouth nightly                  Discharge Instructions: Follow up with ROMINA Redmond in 3-5 days. Take medications as directed. Resume activity as tolerated. Diet: DIET GENERAL;     Disposition: Patient is medically stable and will be discharged to home. Time spent on discharge greater than 30 minutes.     Signed:  Parul Mejía DO

## 2021-02-25 NOTE — DISCHARGE INSTR - DIET

## 2021-02-25 NOTE — PROGRESS NOTES
Nephrology ([address]) Progress Note    Patient:  Carlen Cowden  YOB: 1970  Date of Service: 2/24/2021  MRN: [id number]   Acct: [de-identified]   Primary Care Physician: ROMINA Redmond  Advance Directive: Prior  Admit Date: 2/24/2021       Hospital Day: 0  Referring Provider: Parul Mejía DO    Patient Seen, Chart, Consults, Notes, Labs, Radiology studies reviewed. Subjective:  Patient is a 44 yo pleasant woman who has HTN and ESRD (on maintenance hemodialysis on Tuesday Thursday and Saturday at [facility] dialysis unit). Patient was having increasing pain in her left hip and was diagnosed with advanced arthritis. She was assessed today at the neurosurgery clinic. She was complaining of large palpable lump on her left posterior thigh. With concerns of DVT she was sent to the emergency room for further evaluation. She ruled out for DVT the patient was having severe hyperglycemia at 700 and her serum potassium was elevated at 6. Patient has received dialysis on 2/23 has shortened her treatment by half an hour. She denies any major dyspnea. She does still complain of severe pain in her left hip. Renal service was consulted to consider urgent dialysis and further management of her ESRD. Seen and examined again on dialysis.       Dialysis   Pt was seen on RRT  Modality: Hemodialysis  Access: Arterial Venous Fistula  Location: left upper  QB: 450  QD: 700  UF: 2.5 Liters    Allergies:  Penicillins, Lamisil advanced [terbinafine], Stadol [butorphanol], and Reglan [metoclopramide]    Medicines:  Current Facility-Administered Medications   Medication Dose Route Frequency Provider Last Rate Last Admin    insulin lispro (HUMALOG) injection vial 0-12 Units  0-12 Units Subcutaneous TID WC Parul Mejía DO        insulin lispro (HUMALOG) injection vial 0-6 Units  0-6 Units Subcutaneous Nightly Parul Mejía DO           Past Medical History:  Past Medical History:   Diagnosis Date    Arthritis     Chronic kidney disease, stage 5, kidney failure (HCC)     Closed fracture of right shoulder girdle, with routine healing, subsequent encounter     DM (diabetes mellitus) type II controlled with renal manifestation ([facility] [address] 06/1993    Gastroparesis     GERD (gastroesophageal reflux disease)     Hemodialysis patient ([facility] [address]     dialysis on tues, thur, and sat at [facility]    Hypertension     Hypothyroid     Nasal congestion     recent    Noncompliance with medications     Palliative care patient 10/08/2019    Restless leg syndrome        Past Surgical History:  Past Surgical History:   Procedure Laterality Date    BREAST SURGERY Left 2008    infected milk gland removed    CHOLECYSTECTOMY, LAPAROSCOPIC      2008    COLONOSCOPY Left 9/17/2020    Dr Higinio Dee hepatic flexure-Severe tortuosity with severe spasming, 1 yr recall    DIALYSIS FISTULA CREATION Left 1/25/2019    REVISION LEFT UPPER EXTREMITY AV ACCESS WITH LEFT BRACHIAL ARTERY TO LEFT AXILLARY VEIN WITH  INTERPOSITIONAL ARTEGRAFT   performed by Zeeshan Real MD at [address]  2012    [address] Right 1/25/2019    INSERTION OF RIGHT INTERNAL JUGULAR HEMODIALYSIS CATHETER performed by Zeeshan Real MD at [address] [facility]  08/17/2018    1.  Moderately increased stainable iron identified by special stain in Kupffer cells and occasional hepatocytes. Negative for evidence of significant portal or lobular inflammation.  Negative for evidence of significant fibrosis    MN COLONOSCOPY W/BIOPSY SINGLE/MULTIPLE N/A 10/20/2017    Dr Quezada Risk prep-Tubular AP (-) dysplasia x 2--3 yr recall    MN EGD TRANSORAL BIOPSY SINGLE/MULTIPLE N/A 12/27/2016    Dr Tristan Ruiz EGD TRANSORAL BIOPSY SINGLE/MULTIPLE N/A 10/20/2017    Dr Giorgi Cedeno (-)    TUBAL LIGATION      1993    VASCULAR SURGERY Left 2016    fistula by Dr Deirdre Cheatham at [address] 10/02/2017    [facility]. Left upper fistulograms/venograms, balloon angioplasty cephalic vein arch 83B21 conquest.    VASCULAR SURGERY  2018    FISTULOGRAM    VASCULAR SURGERY  10/29/2018    [facility]. Left upper fistulograms/venograms    VASCULAR SURGERY  2018    [facility]. Arch aortogram,left upper arteriograms.  VASCULAR SURGERY  2019    [facility]. Removal of tunneled dialyis catheter right internal jugular vein.  VASCULAR SURGERY  2019    [facility]. Left upper extremity fistulogram including venography to the superior vena cava. Balloon angioplasty left subclavian vein with 10mm x 40mm conquest balloon. Balloon angioplasty mid/proximal upper arm cephalic vein with 39ZH x 40mm conquest balloon. Venograms after balloon angioplasty. Stent left mid/proximal upper arm cephalic vein stenosis fluency 10mm x 60mm self-expanding covered stent. Balloon     VASCULAR SURGERY  2019    cont angioplasty stent with 10mm x 40mm conquest balloon. Completion venograms left upper extremity.        Family History  Family History   Problem Relation Age of Onset    Colon Cancer Mother          at 63    Colon Polyps Mother     Lung Cancer Father          at 79    Colon Polyps Father     Stomach Cancer Sister     Breast Cancer Sister 27    Depression Daughter 25        committed suicide    Liver Cancer Neg Hx     Liver Disease Neg Hx     Esophageal Cancer Neg Hx     Rectal Cancer Neg Hx        Social History  Social History     Socioeconomic History    Marital status: Single     Spouse name: Not on file    Number of children: 2    Years of education: Not on file    Highest education level: Not on file   Occupational History    Not on file   Social Needs    Financial resource strain: Not on file    Food insecurity     Worry: Not on file     Inability: Not on file    Transportation needs     Medical: Not on file     Non-medical: Not on file   Tobacco Use    Smoking status: Current Every Day Smoker     Packs/day: 0.50     Years: 33.00     Pack years: 16.50     Types: Cigarettes    Smokeless tobacco: Never Used    Tobacco comment: NOW at 1/4 PD, started at age 24 and has averaged 2 PPD   Substance and Sexual Activity    Alcohol use: No    Drug use: Not Currently     Types: Marijuana    Sexual activity: Not Currently     Partners: Male   Lifestyle    Physical activity     Days per week: Not on file     Minutes per session: Not on file    Stress: Not on file   Relationships    Social connections     Talks on phone: Not on file     Gets together: Not on file     Attends [employer] service: Not on file     Active member of club or organization: Not on file     Attends meetings of clubs or organizations: Not on file     Relationship status: Not on file    Intimate partner violence     Fear of current or ex partner: Not on file     Emotionally abused: Not on file     Physically abused: Not on file     Forced sexual activity: Not on file   Other Topics Concern    Not on file   Social History Narrative    Not on file         Review of Systems:  Reviewed with the patient at the bedside, 6 points reviewed and negative except as noted above.       Objective:    BP: 205/85   HR: 70  General: awake/alert   Chest:  clear to auscultation bilaterally without respiratory distress  CVS: regular rate and rhythm  Abdominal: soft, nontender, normal bowel sounds  Extremities: no cyanosis , left leg edema  Skin: warm and dry without rash    Labs:  BMP:   Recent Labs     02/24/21  1135   *   K 6.0*   CL 79*   CO2 32*   BUN 56*   CREATININE 3.5*   CALCIUM 9.5     CBC:   Recent Labs     02/24/21  1135   WBC 10.8   HGB 8.6*   HCT 26.7*   MCV 96.4        LIVER PROFILE:   Recent Labs     02/24/21  1135   AST 14   ALT 11   BILITOT 0.4   ALKPHOS 119*     PT/INR:   Recent Labs     02/24/21  1135   PROTIME 13.2   INR 1.01     APTT:   Recent Labs     02/24/21  1135   APTT 35.7     BNP:  No results for input(s): BNP in the last 72 hours. Ionized Calcium:No results for input(s): IONCA in the last 72 hours. Magnesium:No results for input(s): MG in the last 72 hours. Phosphorus:No results for input(s): PHOS in the last 72 hours. HgbA1C: No results for input(s): LABA1C in the last 72 hours. Hepatic:   Recent Labs     02/24/21  1135   ALKPHOS 119*   ALT 11   AST 14   PROT 6.9   BILITOT 0.4   LABALBU 3.6     Lactic Acid: No results for input(s): LACTA in the last 72 hours. Troponin: No results for input(s): CKTOTAL, CKMB, TROPONINT in the last 72 hours. ABGs: No results for input(s): PH, PCO2, PO2, HCO3, O2SAT in the last 72 hours. CRP:  No results for input(s): CRP in the last 72 hours. Sed Rate:  No results for input(s): SEDRATE in the last 72 hours. Cultures:   No results for input(s): CULTURE in the last 72 hours. No results for input(s): BC, Banda Haste in the last 72 hours. No results for input(s): CXSURG in the last 72 hours. Radiology reports as per the Radiologist  Radiology: Xr Knee Left (1-2 Views)    Result Date: 2/24/2021  EXAMINATION: XR KNEE LEFT (1-2 VIEWS) 2/24/2021 10:56 AM HISTORY: Gradual left knee pain COMPARISON: None FINDINGS: Medial and lateral compartment narrowing are evident. Patellofemoral degenerative spurring is noted. There is no evidence of acute fracture or dislocation. Patient appears osteopenic. No lytic or sclerotic lesions are identified. No joint effusion is seen. Vascular calcifications are present. No acute bony abnormality identified. Tricompartmental osteoarthritis.  Signed by Dr Thomas Cancer on 2/24/2021 10:57 AM    Vl Extremity Venous Left    Result Date: 2/24/2021  Vascular Lower Extremities DVT Study Procedure  Demographics   Patient Name     Patience OLIVEIRA Age                   45   Patient Number   [id number]         Gender                Female   Visit Number     [id number]      Interpreting          Deysi Landrum MD                                  Physician   Date of Birth 1970     Referring Physician   Elen Forte   Accession Number [id number]     [address] [place], [place]  Procedure Type of Study:   Veins:Lower Extremities DVT Study, VL EXTREMITY VENOUS DUPLEX LEFT. Indications for Study:Edema, left lower extremity. Allergies   - Penicillins.   - Stadol.   - Other allergy:(Lamisil Advanced). Technical Quality:Limited visualization due to edema. Impression   There is no evidence of deep venous thrombosis (DVT) in the left lower  extremity(ies). There is no evidence of superficial thrombophlebitis of the left lower  extremity(ies). The exam is technically limited due to edema ; therefore the left tibial  veins were not well visualized. Signature   ----------------------------------------------------------------  Electronically signed by Gio Hurd MD(Interpreting  physician) on 02/24/2021 02:12 PM  ----------------------------------------------------------------  Velocities are measured in cm/s ; Diameters are measured in mm Right Lower Extremities DVT Study Measurements Right 2D Measurements +------------------------------------+----------+---------------+----------+ ! Location                            ! Visualized! Compressibility! Thrombosis! +------------------------------------+----------+---------------+----------+ ! Sapheno Femoral Junction            ! Yes       ! Yes            ! None      ! +------------------------------------+----------+---------------+----------+ ! Common Femoral                      !Yes       ! Yes            ! None      ! +------------------------------------+----------+---------------+----------+ Left Lower Extremities DVT Study Measurements Left 2D Measurements +------------------------------------+----------+---------------+----------+ ! Location                            ! Visualized! Compressibility! Thrombosis! +------------------------------------+----------+---------------+----------+ ! Sapheno Femoral Junction !Yes       !Yes            ! None      ! +------------------------------------+----------+---------------+----------+ ! Common Femoral                      !Yes       ! Yes            ! None      ! +------------------------------------+----------+---------------+----------+ ! Prox Femoral                        !Yes       ! Yes            ! None      ! +------------------------------------+----------+---------------+----------+ ! Mid Femoral                         !Yes       ! Yes            ! None      ! +------------------------------------+----------+---------------+----------+ ! Dist Femoral                        !Yes       ! Yes            ! None      ! +------------------------------------+----------+---------------+----------+ ! Deep Femoral                        !Yes       ! Yes            ! None      ! +------------------------------------+----------+---------------+----------+ ! Popliteal                           !Yes       ! Yes            ! None      ! +------------------------------------+----------+---------------+----------+ ! SSV                                 ! Yes       ! Yes            ! None      ! +------------------------------------+----------+---------------+----------+ ! Gastroc                             ! Yes       ! Yes            ! None      ! +------------------------------------+----------+---------------+----------+ ! PTV                                 ! Partial   !Yes            ! None      ! +------------------------------------+----------+---------------+----------+ ! GSV                                 ! Yes       ! Yes            ! None      ! +------------------------------------+----------+---------------+----------+ ! ATV                                 ! Partial   !Yes            ! None      ! +------------------------------------+----------+---------------+----------+ ! Peroneal                            !Partial   !Yes            ! None      ! +------------------------------------+----------+---------------+----------+    Xr Hip 2-3 Vw W Pelvis Left    Result Date: 2/24/2021  EXAMINATION: XR HIP 2-3 VW W PELVIS LEFT 2/24/2021 10:57 AM HISTORY: Pain with rotation COMPARISON: 1/3/2021 FINDINGS: The patient appears osteopenic. The femoral heads appear appropriately located in the acetabula bilaterally. No lytic or sclerotic lesions are identified. No soft tissue abnormality is seen. Vascular calcifications are noted in the pelvis. Osteopenia. No acute osseous abnormality identified. Signed by Dr Sabrina Jiménez on 2/24/2021 10:59 AM       Assessment   1. End-stage renal disease  2. Hypertensive nephropathy  3. Osteoarthritis of the hip  4. Hyperkalemia  5. Anemia of chronic kidney disease  6. Type 2 diabetes     Plan:  Dialysis as above. Follow-up labs.     Jeremías Bell MD  02/24/21  6:16 PM

## 2021-02-25 NOTE — PROGRESS NOTES
Nephrology ([facility]) Progress Note    Patient:  Jose Guadalupe Billing  YOB: 1970  Date of Service: 2/25/2021  MRN: [id number]   Acct: [de-identified]   Primary Care Physician: ROMINA Khalil  Advance Directive: Prior  Admit Date: 2/24/2021       Hospital Day: 0  Referring Provider: Hodan Han DO    Patient Seen, Chart, Consults, Notes, Labs, Radiology studies reviewed. Subjective:  Patient is a 46 yo pleasant woman who has HTN and ESRD (on maintenance hemodialysis on Tuesday Thursday and Saturday at [facility] dialysis unit). Patient was having increasing pain in her left hip and was diagnosed with advanced arthritis. She was assessed today at the neurosurgery clinic. She was complaining of large palpable lump on her left posterior thigh. With concerns of DVT she was sent to the emergency room for further evaluation. She ruled out for DVT the patient was having severe hyperglycemia at 700 and her serum potassium was elevated at 6. Patient has received dialysis on 2/23 has shortened her treatment by half an hour. She denies any major dyspnea. She does still complain of severe pain in her left hip. Renal service was consulted and urgent dialysis was provided. Seen and examined again on dialysis. She was still having severe pain in her left hip.       Dialysis   Pt was seen on RRT  Modality: Hemodialysis  Access: Arterial Venous Fistula  Location: left upper  QB: 450  QD: 700  UF: 3 Liters    Allergies:  Penicillins, Lamisil advanced [terbinafine], Stadol [butorphanol], and Reglan [metoclopramide]    Medicines:  Current Facility-Administered Medications   Medication Dose Route Frequency Provider Last Rate Last Admin    DULoxetine (CYMBALTA) extended release capsule 60 mg  60 mg Oral Daily Hodan Han, DO   60 mg at 02/24/21 2142    rOPINIRole (REQUIP) tablet 4 mg  4 mg Oral Nightly Lakeshaviwilber Ban, DO   4 mg at 02/24/21 2142    atorvastatin (LIPITOR) tablet 40 mg  40 mg Oral Daily Darice Roads, DO   40 mg at 02/24/21 2142    insulin lispro (HUMALOG) injection vial 10 Units  10 Units Subcutaneous TID  Darice Roads, DO        isosorbide mononitrate (IMDUR) extended release tablet 120 mg  120 mg Oral Daily Darice Roads, DO   120 mg at 02/24/21 2143    NIFEdipine (PROCARDIA XL) extended release tablet 60 mg  60 mg Oral Daily Darice Roads, DO   60 mg at 02/24/21 2142    levothyroxine (SYNTHROID) tablet 150 mcg  150 mcg Oral Daily Darice Roads, DO   150 mcg at 02/25/21 0600    ipratropium-albuterol (DUONEB) nebulizer solution 1 ampule  1 ampule Inhalation 4x daily Darice Roads, DO   1 ampule at 02/25/21 8133    lisinopril (PRINIVIL;ZESTRIL) tablet 20 mg  20 mg Oral BID Darice Roads, DO   20 mg at 02/24/21 2142    metoprolol succinate (TOPROL XL) extended release tablet 100 mg  100 mg Oral QAM  Darice Roads, DO   100 mg at 02/25/21 0601    sevelamer (RENVELA) tablet 800 mg  800 mg Oral TID  Darice Roads, DO        cloNIDine (CATAPRES) tablet 0.3 mg  0.3 mg Oral 3 times per day Darice Roads, DO   0.3 mg at 02/25/21 8767    ondansetron (ZOFRAN-ODT) disintegrating tablet 4 mg  4 mg Oral Q8H PRN Darice Roads, DO        vitamin D (ERGOCALCIFEROL) capsule 50,000 Units  50,000 Units Oral Q14 Days Darice Roads, DO   50,000 Units at 02/24/21 2143    senna (SENOKOT) tablet 8.6 mg  1 tablet Oral Daily Darice Roads, DO   8.6 mg at 02/24/21 2143    linaclotide (LINZESS) capsule 290 mcg  290 mcg Oral QAM  Darice Roads, DO   290 mcg at 02/25/21 0601    levETIRAcetam (KEPPRA) tablet 500 mg  500 mg Oral Daily Darice Roads, DO   500 mg at 02/24/21 2143    hydrALAZINE (APRESOLINE) tablet 75 mg  75 mg Oral 3 times per day Darice Roads, DO   75 mg at 02/25/21 0600    hydroCHLOROthiazide (HYDRODIURIL) tablet 50 mg  50 mg Oral Daily Jesi Shall Berna Salaam, DO   50 mg at 02/24/21 2142    insulin glargine (LANTUS) injection vial 36 Units  36 Units Subcutaneous Daily Shelvia Ban, DO   36 Units at 02/24/21 2316    nicotine (NICODERM CQ) 14 MG/24HR 1 patch  1 patch Transdermal Daily Shelvia Ban, DO   1 patch at 02/24/21 2143    insulin lispro (HUMALOG) injection vial 0-12 Units  0-12 Units Subcutaneous TID WC Shelvia Ban, DO        insulin lispro (HUMALOG) injection vial 0-6 Units  0-6 Units Subcutaneous Nightly Shelvia Ban, DO   3 Units at 02/24/21 2318    lidocaine 4 % external patch 1 patch  1 patch Transdermal Once You Johnson MD   1 patch at 02/24/21 2143    acetaminophen (TYLENOL) tablet 650 mg  650 mg Oral Q4H PRN Yuo Johnson MD   650 mg at 02/25/21 0601       Past Medical History:  Past Medical History:   Diagnosis Date    Arthritis     Chronic kidney disease, stage 5, kidney failure (HCC)     Closed fracture of right shoulder girdle, with routine healing, subsequent encounter     DM (diabetes mellitus) type II controlled with renal manifestation ([address] 06/1993    Gastroparesis     GERD (gastroesophageal reflux disease)     Hemodialysis patient ([address]     dialysis on tues, thur, and sat at [facility] clinic    Hypertension     Hypothyroid     Nasal congestion     recent    Noncompliance with medications     Palliative care patient 10/08/2019    Restless leg syndrome        Past Surgical History:  Past Surgical History:   Procedure Laterality Date    BREAST SURGERY Left 2008    infected milk gland removed    CHOLECYSTECTOMY, LAPAROSCOPIC      2008    COLONOSCOPY Left 9/17/2020    Dr Duc Ramires hepatic flexure-Severe tortuosity with severe spasming, 1 yr recall    DIALYSIS FISTULA CREATION Left 1/25/2019    REVISION LEFT UPPER EXTREMITY AV ACCESS WITH LEFT BRACHIAL ARTERY TO LEFT AXILLARY VEIN WITH  INTERPOSITIONAL ARTEGRAFT   performed by Maurice Kee MD at [address]  2012  HC DIALYSIS CATHETER Right 2019    INSERTION OF RIGHT INTERNAL JUGULAR HEMODIALYSIS CATHETER performed by Ninfa Barnes MD at [facility]  2018    1.  Moderately increased stainable iron identified by special stain in Kupffer cells and occasional hepatocytes. Negative for evidence of significant portal or lobular inflammation. Negative for evidence of significant fibrosis    [place] COLONOSCOPY W/BIOPSY SINGLE/MULTIPLE N/A 10/20/2017    Dr Smitha Becker prep-Tubular AP (-) dysplasia x 2--3 yr recall    [place] EGD TRANSORAL BIOPSY SINGLE/MULTIPLE N/A 2016    Dr Adrienne Lopes EGD TRANSORAL BIOPSY SINGLE/MULTIPLE N/A 10/20/2017    Dr Demetris Herrera (-)   [address]    fistula by Dr Christin Bustamante at [address]  10/02/2017    [facility]. Left upper fistulograms/venograms, balloon angioplasty cephalic vein arch 11H02 conquest.    VASCULAR SURGERY  2018    FISTULOGRAM    VASCULAR SURGERY  10/29/2018    [facility]. Left upper fistulograms/venograms    VASCULAR SURGERY  2018    [facility]. Arch aortogram,left upper arteriograms.  VASCULAR SURGERY  2019    [facility]. Removal of tunneled dialyis catheter right internal jugular vein.  VASCULAR SURGERY  2019    [facility]. Left upper extremity fistulogram including venography to the superior vena cava. Balloon angioplasty left subclavian vein with 10mm x 40mm conquest balloon. Balloon angioplasty mid/proximal upper arm cephalic vein with 88KG x 40mm conquest balloon. Venograms after balloon angioplasty. Stent left mid/proximal upper arm cephalic vein stenosis fluency 10mm x 60mm self-expanding covered stent. Balloon     VASCULAR SURGERY  2019    cont angioplasty stent with 10mm x 40mm conquest balloon. Completion venograms left upper extremity.        Family History  Family History   Problem Relation Age of Onset    Colon Cancer Mother          at 63    Colon Polyps Mother    Geoff Mast Father  at 66    Colon Polyps Father     Stomach Cancer Sister     Breast Cancer Sister 27    Depression Daughter 25        committed suicide    Liver Cancer Neg Hx     Liver Disease Neg Hx     Esophageal Cancer Neg Hx     Rectal Cancer Neg Hx        Social History  Social History     Socioeconomic History    Marital status: Single     Spouse name: Not on file    Number of children: 2    Years of education: Not on file    Highest education level: Not on file   Occupational History    Not on file   Social Needs    Financial resource strain: Not on file    Food insecurity     Worry: Not on file     Inability: Not on file    Transportation needs     Medical: Not on file     Non-medical: Not on file   Tobacco Use    Smoking status: Current Every Day Smoker     Packs/day: 0.50     Years: 33.00     Pack years: 16.50     Types: Cigarettes    Smokeless tobacco: Never Used    Tobacco comment: NOW at 1/4 PD, started at age 24 and has averaged 2 PPD   Substance and Sexual Activity    Alcohol use: No    Drug use: Not Currently     Types: Marijuana    Sexual activity: Not Currently     Partners: Male   Lifestyle    Physical activity     Days per week: Not on file     Minutes per session: Not on file    Stress: Not on file   Relationships    Social connections     Talks on phone: Not on file     Gets together: Not on file     Attends Mu-ism service: Not on file     Active member of club or organization: Not on file     Attends meetings of clubs or organizations: Not on file     Relationship status: Not on file    Intimate partner violence     Fear of current or ex partner: Not on file     Emotionally abused: Not on file     Physically abused: Not on file     Forced sexual activity: Not on file   Other Topics Concern    Not on file   Social History Narrative    Not on file         Review of Systems:  Reviewed with the patient at the bedside, 6 points reviewed and negative except as noted above.      Objective:    BP: 126/67  HR: 82  General: awake/alert   Chest:  clear to auscultation bilaterally without respiratory distress  CVS: regular rate and rhythm  Abdominal: soft, nontender, normal bowel sounds  Extremities: no cyanosis , left leg edema  Skin: warm and dry without rash    Labs:  BMP:   Recent Labs     02/24/21  1135 02/25/21  0213   * 133*   K 6.0* 4.6   CL 79* 92*   CO2 32* 28   BUN 56* 30*   CREATININE 3.5* 2.8*   CALCIUM 9.5 8.7     CBC:   Recent Labs     02/24/21  1135   WBC 10.8   HGB 8.6*   HCT 26.7*   MCV 96.4        LIVER PROFILE:   Recent Labs     02/24/21  1135   AST 14   ALT 11   BILITOT 0.4   ALKPHOS 119*     PT/INR:   Recent Labs     02/24/21  1135   PROTIME 13.2   INR 1.01     APTT:   Recent Labs     02/24/21  1135   APTT 35.7     BNP:  No results for input(s): BNP in the last 72 hours. Ionized Calcium:No results for input(s): IONCA in the last 72 hours. Magnesium:No results for input(s): MG in the last 72 hours. Phosphorus:No results for input(s): PHOS in the last 72 hours. HgbA1C: No results for input(s): LABA1C in the last 72 hours. Hepatic:   Recent Labs     02/24/21  1135   ALKPHOS 119*   ALT 11   AST 14   PROT 6.9   BILITOT 0.4   LABALBU 3.6     Lactic Acid: No results for input(s): LACTA in the last 72 hours. Troponin: No results for input(s): CKTOTAL, CKMB, TROPONINT in the last 72 hours. ABGs: No results for input(s): PH, PCO2, PO2, HCO3, O2SAT in the last 72 hours. CRP:  No results for input(s): CRP in the last 72 hours. Sed Rate:  No results for input(s): SEDRATE in the last 72 hours. Cultures:   No results for input(s): CULTURE in the last 72 hours. No results for input(s): BC, Jayjay Geronimo in the last 72 hours. No results for input(s): CXSURG in the last 72 hours.     Radiology reports as per the Radiologist  Radiology: Xr Knee Left (1-2 Views)    Result Date: 2/24/2021  EXAMINATION: XR KNEE LEFT (1-2 VIEWS) 2/24/2021 10:56 AM HISTORY: Gradual left knee pain COMPARISON: None FINDINGS: Medial and lateral compartment narrowing are evident. Patellofemoral degenerative spurring is noted. There is no evidence of acute fracture or dislocation. Patient appears osteopenic. No lytic or sclerotic lesions are identified. No joint effusion is seen. Vascular calcifications are present. No acute bony abnormality identified. Tricompartmental osteoarthritis. Signed by Dr Sabrina Jiménez on 2/24/2021 10:57 AM    Vl Extremity Venous Left    Result Date: 2/24/2021  Vascular Lower Extremities DVT Study Procedure  Demographics   Patient Name     Erika OLIVEIRA Age                   45   Patient Number   [id number]         Gender                Female   Visit Number     [id number]      Interpreting          Twin Chau MD                                  Physician   Date of Birth    1970     Referring Physician   Tutu Flores   Accession Number [id number]     [address] [place] [address]  Procedure Type of Study:   Veins:Lower Extremities DVT Study, VL EXTREMITY VENOUS DUPLEX LEFT. Indications for Study:Edema, left lower extremity. Allergies   - Penicillins.   - Stadol.   - Other allergy:(Lamisil Advanced). Technical Quality:Limited visualization due to edema. Impression   There is no evidence of deep venous thrombosis (DVT) in the left lower  extremity(ies). There is no evidence of superficial thrombophlebitis of the left lower  extremity(ies). The exam is technically limited due to edema ; therefore the left tibial  veins were not well visualized.    Signature   ----------------------------------------------------------------  Electronically signed by Twin Chau MD(Interpreting  physician) on 02/24/2021 02:12 PM  ----------------------------------------------------------------  Velocities are measured in cm/s ; Diameters are measured in mm Right Lower Extremities DVT Study Measurements Right 2D Measurements !Yes       !Yes            ! None      ! +------------------------------------+----------+---------------+----------+ ! Gastroc                             ! Yes       ! Yes            ! None      ! +------------------------------------+----------+---------------+----------+ ! PTV                                 ! Partial   !Yes            ! None      ! +------------------------------------+----------+---------------+----------+ ! GSV                                 ! Yes       ! Yes            ! None      ! +------------------------------------+----------+---------------+----------+ ! ATV                                 ! Partial   !Yes            ! None      ! +------------------------------------+----------+---------------+----------+ ! Peroneal                            !Partial   !Yes            ! None      ! +------------------------------------+----------+---------------+----------+    Xr Hip 2-3 Vw W Pelvis Left    Result Date: 2/24/2021  EXAMINATION: XR HIP 2-3 VW W PELVIS LEFT 2/24/2021 10:57 AM HISTORY: Pain with rotation COMPARISON: 1/3/2021 FINDINGS: The patient appears osteopenic. The femoral heads appear appropriately located in the acetabula bilaterally. No lytic or sclerotic lesions are identified. No soft tissue abnormality is seen. Vascular calcifications are noted in the pelvis. Osteopenia. No acute osseous abnormality identified. Signed by Dr Madiha Nieto on 2/24/2021 10:59 AM       Assessment   1. End-stage renal disease  2. Hypertensive nephropathy  3. Osteoarthritis of the hip  4. Hyperkalemia  5. Anemia of chronic kidney disease  6. Type 2 diabetes     Plan:  Dialysis as above. Follow-up labs. Ok to discharge from renal standpoint and follow up at her outpatient dialysis unit.      Maribel Atkins MD  02/25/21  9:11 AM

## 2021-02-25 NOTE — CONSULTS
Renal Consult Note    Thank you to requesting provider: Dr Corina Roach, for asking us to see Richard Reardon    Reason for consultation:  ESRD    Chief Complaint:  Hyperkalemia    History of Presenting Illness      Patient is a 44 yo pleasant woman who has HTN, DM2 and ESRD (on maintenance hemodialysis on Tuesday Thursday and Saturday at [facility] dialysis unit). Patient was having increasing pain in her left hip and was diagnosed with advanced arthritis. She was assessed today at the neurosurgery clinic. She was complaining of large palpable lump on her left posterior thigh. With concerns of DVT she was sent to the emergency room for further evaluation. She ruled out for DVT the patient was having severe hyperglycemia at 700 and her serum potassium was elevated at 6. Patient has received dialysis on 2/23 has shortened her treatment by half an hour. She denies any major dyspnea. She does still complain of severe pain in her left hip. Renal service was consulted to consider urgent dialysis and further management of her ESRD.     Past Medical/Surgical History      Active Ambulatory Problems     Diagnosis Date Noted    Gastroesophageal reflux disease without esophagitis     Acquired hypothyroidism     Hyperkalemia 05/23/2017    Anemia in chronic kidney disease (CKD) 05/23/2017    Iron deficiency 05/23/2017    Family history of colon cancer 10/20/2017    Unintentional weight loss 10/20/2017    Type 2 diabetes mellitus with chronic kidney disease on chronic dialysis, with long-term current use of insulin ([address] 12/09/2017    Noncompliance of patient with renal dialysis ([address]     Respiratory failure ([address] 03/05/2018    Acute respiratory failure with hypoxia and hypercapnia ([address] 03/07/2018    Dialysis AV fistula malfunction (HCC) 06/27/2018    Gastroparesis 08/08/2018    Nausea and vomiting 08/08/2018    Chronic constipation 08/08/2018    Uncontrolled type 2 diabetes mellitus with complication, with long-term current use of insulin ([address] 08/08/2018    Abnormal CT of liver 08/15/2018    Other ascites 08/15/2018    ESRD (end stage renal disease) on dialysis ([address]     High hepatic iron concentration determined by biopsy of liver 12/05/2018    Steal syndrome as complication of dialysis access ([address] 12/19/2018    PVD (peripheral vascular disease) ([address]     ESRD on hemodialysis ([address] 01/25/2019    Hyponatremia 01/26/2019    Hyperglycemia due to type 2 diabetes mellitus ([address] 01/26/2019    Normocytic anemia 01/26/2019    Volume overload 03/27/2019    Left homonymous hemianopsia     Acute intractable headache     Accelerated hypertension 04/20/2019    Medically noncompliant 04/20/2019    Noncompliance with medications     Seizure ([address] 10/08/2019    Hyperglycemic hyperosmolar nonketotic coma ([address]     Type 2 diabetes mellitus with hyperosmolar coma, with long-term current use of insulin ([address]     Palliative care patient 10/08/2019    Hypertensive emergency 06/24/2020    Chronic epigastric pain 08/18/2020    Chronic nausea 08/18/2020    Chronic vomiting 08/18/2020    Abnormal CT of the abdomen 08/18/2020    Poor appetite 08/18/2020    Personal history of colonic polyps 08/18/2020    Hyperosmolar syndrome 09/16/2020    DKA, type 2, not at goal [facility]) 10/21/2020    Hypertensive urgency 10/21/2020    Altered mental state 10/21/2020    Sacroiliitis ([address] 01/13/2021    Lumbar radiculopathy 01/13/2021    Left leg pain 02/24/2021    Left leg swelling 02/24/2021    Unable to ambulate 02/24/2021     Resolved Ambulatory Problems     Diagnosis Date Noted    Generalized abdominal pain 11/04/2016    Non-intractable vomiting with nausea 11/04/2016    Chronic constipation 11/04/2016    Dysphagia 11/04/2016    Heartburn 11/04/2016    Gastroparesis 03/25/2020    Hypertensive emergency     Hypoglycemia 05/22/2017    Hyperosmolarity due to secondary diabetes ([address] 12/09/2017    Hypertensive urgency, malignant     Acute encephalopathy     ESRD [facility] hemodialysis ([facility]     Hyponatremia 2018    Diabetic ketoacidosis without coma associated with type 2 diabetes mellitus ([facility] 2019    Delirium 2019    Hypertensive encephalopathy 2019     Past Medical History:   Diagnosis Date    Arthritis     Chronic kidney disease, stage 5, kidney failure (HCC)     Closed fracture of right shoulder girdle, with routine healing, subsequent encounter     DM (diabetes mellitus) type II controlled with renal manifestation ([facility] 1993    GERD (gastroesophageal reflux disease)     Hemodialysis patient ([facility]     Hypertension     Hypothyroid     Nasal congestion     Restless leg syndrome          Review of Systems     Constitutional:  No weight loss, no fever/chills  Eyes:  No eye pain, no eye redness  Cardiovascular:  No chest pain, no worsening of edema  Respiratory:  No hemoptysis, no stidor  Gastrointestinal:  No blood in stool, no n/v, no diarrhea  Genitoruinary:  No hematuria, no difficulty with urination  Musculoskeletal:  No joint swelling, no redness  Integumentary:  No Rash, no itching  Neurological:  No focal weakness, No new sensory deficit  Psychiatric:  No depression, no confusion  Endocrine:  No polyuria, no polydipsia       Medications        Current Facility-Administered Medications:     insulin lispro (HUMALOG) injection vial 0-12 Units, 0-12 Units, Subcutaneous, TID WC, Ariela Flores, DO    insulin lispro (HUMALOG) injection vial 0-6 Units, 0-6 Units, Subcutaneous, Nightly, Ariela Flores, DO  No outpatient medications have been marked as taking for the 21 encounter [facility] Encounter).        Allergies   Penicillins, Lamisil advanced [terbinafine], Stadol [butorphanol], and Reglan [metoclopramide]    Family History       Family History   Problem Relation Age of Onset    Colon Cancer Mother          at 61    Colon Polyps Mother     Lung Cancer Father          at 66    Colon Polyps Father     Stomach Cancer Sister     Breast Cancer Sister 27    Depression Daughter 25        committed suicide    Liver Cancer Neg Hx     Liver Disease Neg Hx     Esophageal Cancer Neg Hx     Rectal Cancer Neg Hx      Family history negative for kidney disease. Social History      Social History     Socioeconomic History    Marital status: Single     Spouse name: None    Number of children: 2    Years of education: None    Highest education level: None   Occupational History    None   Social Needs    Financial resource strain: None    Food insecurity     Worry: None     Inability: None    Transportation needs     Medical: None     Non-medical: None   Tobacco Use    Smoking status: Current Every Day Smoker     Packs/day: 0.50     Years: 33.00     Pack years: 16.50     Types: Cigarettes    Smokeless tobacco: Never Used    Tobacco comment: NOW at 1/4 PD, started at age 24 and has averaged 2 PPD   Substance and Sexual Activity    Alcohol use: No    Drug use: Not Currently     Types: Marijuana    Sexual activity: Not Currently     Partners: Male   Lifestyle    Physical activity     Days per week: None     Minutes per session: None    Stress: None   Relationships    Social connections     Talks on phone: None     Gets together: None     Attends Hindu service: None     Active member of club or organization: None     Attends meetings of clubs or organizations: None     Relationship status: None    Intimate partner violence     Fear of current or ex partner: None     Emotionally abused: None     Physically abused: None     Forced sexual activity: None   Other Topics Concern    None   Social History Narrative    None       Physical Exam     Blood pressure (!) 180/78, pulse 75, temperature 97.9 °F (36.6 °C), temperature source Oral, resp. rate 16, height 5' 5\" (1.651 m), weight 140 lb (63.5 kg), SpO2 92 %.     General:  NAD, A+Ox3, ill-appearing, normal body habitus  HEENT:  PERRL, EOMI  Neck:  Supple, normal range of movement  Chest:  CTAB, good respiratory effort, good air movement  CV:  RRR, no murmurs   Abdomen:  NTND, soft, +BS, no hepatosplenomegaly  Extremities:  Left leg peripheral edema  Neurological:  Moving all four extremities  Lymphatics:  No palpable lymph nodes   Skin:  No rash, no jaundice  Psychiatric:  Normal insight and judgement, good recall    Data     Recent Labs     02/24/21  1135   WBC 10.8   HGB 8.6*   HCT 26.7*   MCV 96.4        Recent Labs     02/24/21  1135   *   K 6.0*   CL 79*   CO2 32*   GLUCOSE 721*   BUN 56*   CREATININE 3.5*   LABGLOM 14*   GFRAA 17*       Assessment:  1. End-stage renal disease  2. Hypertensive nephropathy  3. Osteoarthritis of the hip  4. Hyperkalemia  5. Anemia of chronic kidney disease  6. Type 2 diabetes      Plan:  · At this stage, the patient will receive dialysis with 2 and half hours tonight with 2K bath. We will continue to follow-up her labs. She will be assessed at the pain management as outpatient. She also received diabetes management. Resume home blood pressure medications.     Thank you for asking us to participate in the management of your patient, please do not hesitate to contact me for any concerns regarding my recommendations as outlined above.    -----------------------------  Electronically signed by Purnima Beck MD on 2/24/21 at 6:10 PM CST

## 2021-02-26 ENCOUNTER — OFFICE VISIT (OUTPATIENT)
Dept: SURGERY | Age: 51
End: 2021-02-26
Payer: MEDICARE

## 2021-02-26 VITALS
WEIGHT: 140 LBS | HEART RATE: 62 BPM | TEMPERATURE: 98.2 F | SYSTOLIC BLOOD PRESSURE: 115 MMHG | DIASTOLIC BLOOD PRESSURE: 66 MMHG | BODY MASS INDEX: 22.5 KG/M2 | HEIGHT: 66 IN

## 2021-02-26 DIAGNOSIS — N64.52 BLOODY DISCHARGE FROM NIPPLE: Primary | ICD-10-CM

## 2021-02-26 LAB — S PYO THROAT QL CULT: NORMAL

## 2021-02-26 PROCEDURE — 99203 OFFICE O/P NEW LOW 30 MIN: CPT | Performed by: PHYSICIAN ASSISTANT

## 2021-02-26 RX ORDER — DOXYCYCLINE HYCLATE 100 MG
100 TABLET ORAL 2 TIMES DAILY
Qty: 20 TABLET | Refills: 0 | Status: SHIPPED | OUTPATIENT
Start: 2021-02-26 | End: 2021-03-08

## 2021-02-26 ASSESSMENT — ENCOUNTER SYMPTOMS: COLOR CHANGE: 1

## 2021-02-26 NOTE — PROGRESS NOTES
Subjective:      Patient ID: Marlene Yeung is a 45 y.o. female. HPI  Ms. Ginger Gillespie presents to establish care for pain and bloody nipple drainage on the right. She states she sometimes has pus drain as well. She has been treated with a round of cipro by her PCP with no improvement. She states several years ago she had to have a duct excised on the left for something similar. This was done in [place]. She has kidney failure and is on dialysis. She was discharged from the hospital yesterday for hyperkalemia. EXAMINATION: [facility] DIGITAL DIAGNOSTIC W OR WO CAD BILATERAL 2/4/2021   12:27 PM   HISTORY: 25-year-old female complaining of 3 weeks of bloody right   nipple discharge and right breast pain. Report: Today's examination consists of craniocaudal and oblique   digital views of both breasts, with 3-D fidel synthesis. In addition,   targeted ultrasound of the right breast was performed in the   retroareolar region. COMPARISON: Bilateral screening mammograms 10/22/2019 and 11/3/2016. There are scattered parenchymal densities, in a Pattern B parenchymal   pattern. No dominant mass or parenchymal distortion is identified. There are no suspicious calcifications, skin thickening or nipple   retraction. Targeted ultrasound of the right breast today demonstrates multiple   slightly dilated ducts in the right retroareolar region, which contain   debris but no discrete nodule or mass. This may be related to mastitis   and clinical correlation is recommended.       Impression   Impression:    1. Today's mammograms are stable and unremarkable. Targeted ultrasound   of the right retroareolar breast demonstrates multiple mildly dilated   ducts, which contains debris but no discrete nodule or mass. This may   represent acute mastitis. Clinical correlation is recommended. Consider repeat ultrasound in 3 months. BI-RADS Category 3, probably   benign. Review of Systems   Skin: Positive for color change. All other systems reviewed and are negative. Objective:   Physical Exam  Constitutional:       Appearance: She is ill-appearing. HENT:      Head: Normocephalic and atraumatic. Eyes:      Extraocular Movements: Extraocular movements intact. Pupils: Pupils are equal, round, and reactive to light. Comments: Bilateral jaundiced conjunctivae. Chest:      Breasts:         Right: Nipple discharge present. No inverted nipple, mass or skin change. Left: No inverted nipple, mass or skin change. Skin:     General: Skin is warm and dry. Coloration: Skin is jaundiced. Neurological:      General: No focal deficit present. Mental Status: She is alert and oriented to person, place, and time. Psychiatric:         Mood and Affect: Mood normal.         Behavior: Behavior normal.         Thought Content: Thought content normal.         Judgment: Judgment normal.         Assessment:      Right nipple drainage      Plan:      I am concerned about an intraductal papilloma, but it is reasonable this is infection too with the patient's tenderness and history of purulent drainage from her nipple. I will place her on doxycycline and have her follow up with Dr. Abdulkadir Cole in 2 weeks. I am concerned about her jaundiced skin and conjunctivae. She states this is not new for her. Her liver enzymes were normal on recent CMP. She should discuss this with her PCP.           Michelle Augustine PA-C

## 2021-02-27 LAB
EKG P AXIS: 75 DEGREES
EKG P-R INTERVAL: 188 MS
EKG Q-T INTERVAL: 418 MS
EKG QRS DURATION: 104 MS
EKG QTC CALCULATION (BAZETT): 430 MS
EKG T AXIS: 90 DEGREES

## 2021-02-27 PROCEDURE — 93010 ELECTROCARDIOGRAM REPORT: CPT | Performed by: INTERNAL MEDICINE

## 2021-03-12 ENCOUNTER — HOSPITAL ENCOUNTER (EMERGENCY)
Age: 51
Discharge: HOME | End: 2021-03-13
Attending: EMERGENCY MEDICINE
Payer: MEDICARE

## 2021-03-12 ENCOUNTER — APPOINTMENT (OUTPATIENT)
Dept: GENERAL RADIOLOGY | Age: 51
End: 2021-03-12
Payer: MEDICARE

## 2021-03-12 DIAGNOSIS — I10 ESSENTIAL HYPERTENSION: ICD-10-CM

## 2021-03-12 DIAGNOSIS — M25.552 LEFT HIP PAIN: Primary | ICD-10-CM

## 2021-03-12 PROCEDURE — 96374 THER/PROPH/DIAG INJ IV PUSH: CPT

## 2021-03-12 PROCEDURE — 99285 EMERGENCY DEPT VISIT HI MDM: CPT

## 2021-03-12 PROCEDURE — 6370000000 HC RX 637 (ALT 250 FOR IP): Performed by: EMERGENCY MEDICINE

## 2021-03-12 PROCEDURE — 73502 X-RAY EXAM HIP UNI 2-3 VIEWS: CPT

## 2021-03-12 RX ORDER — OXYCODONE HYDROCHLORIDE AND ACETAMINOPHEN 5; 325 MG/1; MG/1
1 TABLET ORAL ONCE
Status: COMPLETED | OUTPATIENT
Start: 2021-03-12 | End: 2021-03-12

## 2021-03-12 RX ADMIN — OXYCODONE AND ACETAMINOPHEN 1 TABLET: 5; 325 TABLET ORAL at 22:49

## 2021-03-12 ASSESSMENT — PAIN SCALES - GENERAL
PAINLEVEL_OUTOF10: 10
PAINLEVEL_OUTOF10: 10

## 2021-03-13 ENCOUNTER — APPOINTMENT (OUTPATIENT)
Dept: CT IMAGING | Age: 51
End: 2021-03-13
Payer: MEDICARE

## 2021-03-13 VITALS
SYSTOLIC BLOOD PRESSURE: 193 MMHG | HEIGHT: 66 IN | RESPIRATION RATE: 18 BRPM | HEART RATE: 74 BPM | OXYGEN SATURATION: 96 % | TEMPERATURE: 97.9 F | BODY MASS INDEX: 22.5 KG/M2 | WEIGHT: 140 LBS | DIASTOLIC BLOOD PRESSURE: 85 MMHG

## 2021-03-13 PROCEDURE — 6360000002 HC RX W HCPCS: Performed by: EMERGENCY MEDICINE

## 2021-03-13 PROCEDURE — 6370000000 HC RX 637 (ALT 250 FOR IP): Performed by: EMERGENCY MEDICINE

## 2021-03-13 PROCEDURE — 73700 CT LOWER EXTREMITY W/O DYE: CPT

## 2021-03-13 RX ORDER — OXYCODONE HYDROCHLORIDE AND ACETAMINOPHEN 5; 325 MG/1; MG/1
1 TABLET ORAL ONCE
Status: COMPLETED | OUTPATIENT
Start: 2021-03-13 | End: 2021-03-13

## 2021-03-13 RX ORDER — LISINOPRIL 10 MG/1
20 TABLET ORAL ONCE
Status: COMPLETED | OUTPATIENT
Start: 2021-03-13 | End: 2021-03-13

## 2021-03-13 RX ORDER — HYDROCODONE BITARTRATE AND ACETAMINOPHEN 7.5; 325 MG/1; MG/1
1 TABLET ORAL EVERY 6 HOURS PRN
Qty: 12 TABLET | Refills: 0 | Status: SHIPPED | OUTPATIENT
Start: 2021-03-13 | End: 2021-03-13 | Stop reason: SDUPTHER

## 2021-03-13 RX ORDER — ORPHENADRINE CITRATE 100 MG/1
100 TABLET, EXTENDED RELEASE ORAL 2 TIMES DAILY
Qty: 20 TABLET | Refills: 0 | Status: SHIPPED | OUTPATIENT
Start: 2021-03-13 | End: 2021-03-23

## 2021-03-13 RX ORDER — CLONIDINE HYDROCHLORIDE 0.1 MG/1
0.3 TABLET ORAL ONCE
Status: DISCONTINUED | OUTPATIENT
Start: 2021-03-13 | End: 2021-03-13 | Stop reason: HOSPADM

## 2021-03-13 RX ORDER — HYDROCODONE BITARTRATE AND ACETAMINOPHEN 7.5; 325 MG/1; MG/1
1 TABLET ORAL EVERY 6 HOURS PRN
Qty: 12 TABLET | Refills: 0 | Status: SHIPPED | OUTPATIENT
Start: 2021-03-13 | End: 2021-03-16

## 2021-03-13 RX ORDER — LORAZEPAM 2 MG/ML
1 INJECTION INTRAMUSCULAR ONCE
Status: COMPLETED | OUTPATIENT
Start: 2021-03-13 | End: 2021-03-13

## 2021-03-13 RX ADMIN — LORAZEPAM 1 MG: 2 INJECTION INTRAMUSCULAR; INTRAVENOUS at 00:37

## 2021-03-13 RX ADMIN — LISINOPRIL 20 MG: 10 TABLET ORAL at 03:31

## 2021-03-13 RX ADMIN — OXYCODONE HYDROCHLORIDE AND ACETAMINOPHEN 1 TABLET: 5; 325 TABLET ORAL at 05:27

## 2021-03-13 RX ADMIN — HYDRALAZINE HYDROCHLORIDE 75 MG: 50 TABLET, FILM COATED ORAL at 03:31

## 2021-03-13 ASSESSMENT — ENCOUNTER SYMPTOMS
COUGH: 0
COLOR CHANGE: 0
TROUBLE SWALLOWING: 0
BACK PAIN: 0
RHINORRHEA: 0
PHOTOPHOBIA: 0
VOMITING: 0
NAUSEA: 0
DIARRHEA: 0
ABDOMINAL DISTENTION: 0
WHEEZING: 0
SORE THROAT: 0
ABDOMINAL PAIN: 0
SHORTNESS OF BREATH: 0
CHEST TIGHTNESS: 0
EYE PAIN: 0
CONSTIPATION: 0

## 2021-03-13 ASSESSMENT — PAIN SCALES - GENERAL: PAINLEVEL_OUTOF10: 9

## 2021-03-13 NOTE — ED PROVIDER NOTES
[facility] EMERGENCY DEPT  EMERGENCY DEPARTMENT ENCOUNTER      Pt Name: Randy Silveira  MRN: [id number]  [place] 1970  Date of evaluation: 3/12/2021  Provider: Gunnar Chisholm MD    [address]       Chief Complaint   Patient presents with    Hip Pain     s/p fall, no loc         HISTORY OF PRESENT ILLNESS   (Location/Symptom, Timing/Onset,Context/Setting, Quality, Duration, Modifying Factors, Severity)  Note limiting factors. Randy Silveira is a 45 y.o. female who presents to the emergency department for left hip pain following a fall. Pt hurt her hip back around [place]. Pt was sweeping tonight and tripped over her dogs. Pt fell and landed on her backside. Patient had immediate pain. She was not able to move the extremity secondary to the pain. HPI    NursingNotes were reviewed. REVIEW OF SYSTEMS    (2-9 systems for level 4, 10 or more for level 5)     Review of Systems   Constitutional: Negative for activity change, appetite change, chills, fatigue and fever. HENT: Negative for congestion, ear pain, rhinorrhea, sore throat and trouble swallowing. Eyes: Negative for photophobia, pain and visual disturbance. Respiratory: Negative for cough, chest tightness, shortness of breath and wheezing. Cardiovascular: Negative for chest pain, palpitations and leg swelling. Gastrointestinal: Negative for abdominal distention, abdominal pain, constipation, diarrhea, nausea and vomiting. Genitourinary: Negative for difficulty urinating, dysuria, flank pain, urgency, vaginal bleeding and vaginal discharge. Musculoskeletal: Positive for arthralgias (left hip pain). Negative for back pain, myalgias and neck stiffness. Skin: Negative for color change, pallor and rash. Neurological: Negative for dizziness, weakness, light-headedness, numbness and headaches. Psychiatric/Behavioral: Negative for agitation, behavioral problems, confusion, hallucinations and suicidal ideas.             PAST MEDICALHISTORY     Past Medical History:   Diagnosis Date    Arthritis     Chronic kidney disease, stage 5, kidney failure (HCC)     Closed fracture of right shoulder girdle, with routine healing, subsequent encounter     DM (diabetes mellitus) type II controlled with renal manifestation ([facility] 06/1993    Gastroparesis     GERD (gastroesophageal reflux disease)     Hemodialysis patient ([facility] [address]     dialysis on tues, thur, and sat at [facility]    Hypertension     Hypothyroid     Nasal congestion     recent    Noncompliance with medications     Palliative care patient 10/08/2019    Restless leg syndrome          SURGICAL HISTORY       Past Surgical History:   Procedure Laterality Date    BREAST SURGERY Left 2008    infected milk gland removed    CHOLECYSTECTOMY, LAPAROSCOPIC      2008    COLONOSCOPY Left 9/17/2020    Dr Alix Castleman hepatic flexure-Severe tortuosity with severe spasming, 1 yr recall    DIALYSIS FISTULA CREATION Left 1/25/2019    REVISION LEFT UPPER EXTREMITY AV ACCESS WITH LEFT BRACHIAL ARTERY TO LEFT AXILLARY VEIN WITH  INTERPOSITIONAL ARTEGRAFT   performed by Thierry Elizabeth MD at [address]  2012    [address] Right 1/25/2019    INSERTION OF RIGHT INTERNAL JUGULAR HEMODIALYSIS CATHETER performed by Thierry Elizabeth MD at [facility]  08/17/2018    1.  Moderately increased stainable iron identified by special stain in Kupffer cells and occasional hepatocytes. Negative for evidence of significant portal or lobular inflammation.  Negative for evidence of significant fibrosis    PA COLONOSCOPY W/BIOPSY SINGLE/MULTIPLE N/A 10/20/2017    Dr Marni Grijalva prep-Tubular AP (-) dysplasia x 2--3 yr recall    PA EGD TRANSORAL BIOPSY SINGLE/MULTIPLE N/A 12/27/2016    Dr Alivia Albert EGD TRANSORAL BIOPSY SINGLE/MULTIPLE N/A 10/20/2017    Dr Briceño Clamp (-)    TUBAL LIGATION      1993    VASCULAR SURGERY Left 2016    fistula by Dr Dano Huff at [facility]    VASCULAR SURGERY  10/02/2017    [facility]. Left upper fistulograms/venograms, balloon angioplasty cephalic vein arch [id number] conquest.    VASCULAR SURGERY  08/2018    FISTULOGRAM    VASCULAR SURGERY  10/29/2018    SJS. Left upper fistulograms/venograms    VASCULAR SURGERY  12/19/2018    SJS. Arch aortogram,left upper arteriograms.  VASCULAR SURGERY  03/06/2019    [facility]JS. Removal of tunneled dialyis catheter right internal jugular vein.  VASCULAR SURGERY  08/28/2019    SJS. Left upper extremity fistulogram including venography to the superior vena cava. Balloon angioplasty left subclavian vein with 10mm x 40mm conquest balloon. Balloon angioplasty mid/proximal upper arm cephalic vein with 42ML x 40mm conquest balloon. Venograms after balloon angioplasty. Stent left mid/proximal upper arm cephalic vein stenosis fluency 10mm x 60mm self-expanding covered stent. Balloon     VASCULAR SURGERY  08/28/2019    cont angioplasty stent with 10mm x 40mm conquest balloon. Completion venograms left upper extremity.          CURRENT MEDICATIONS     Discharge Medication List as of 3/13/2021  5:40 AM      CONTINUE these medications which have NOT CHANGED    Details   insulin glargine (LANTUS SOLOSTAR) 100 UNIT/ML injection pen Inject 40 Units into the skin daily, Disp-5 pen, R-3Normal      levETIRAcetam (KEPPRA) 500 MG tablet Take 1 tablet by mouth daily, Disp-60 tablet,R-3Normal      hydrALAZINE (APRESOLINE) 50 MG tablet Take 1.5 tablets by mouth every 8 hours, Disp-90 tablet,R-0Normal      hydroCHLOROthiazide (HYDRODIURIL) 25 MG tablet Take 2 tablets by mouth daily, Disp-30 tablet,R-3Normal      nicotine (NICODERM CQ) 14 MG/24HR Place 1 patch onto the skin daily for 14 days, Disp-14 patch,R-5Normal      ondansetron (ZOFRAN-ODT) 4 MG disintegrating tablet Take 4 mg by mouth every 8 hours as needed for Nausea or VomitingHistorical Med      Ergocalciferol (VITAMIN D2) 10 MCG (400 UNIT) TABS Take 1.25 mg by mouth every 14 daysHistorical Med senna (SENNA-LAX) 8.6 MG tablet Take 1 tablet by mouth dailyHistorical Med      linaclotide (LINZESS) 290 MCG CAPS capsule Take 1 capsule by mouth every morning (before breakfast), Disp-30 capsule,R-11Normal      cloNIDine (CATAPRES) 0.3 MG tablet Take 1 tablet by mouth every 8 hours, Disp-60 tablet, R-3Normal      sevelamer (RENVELA) 800 MG tablet Take 1 tablet by mouth 3 times daily (with meals)Historical Med      lisinopril (PRINIVIL;ZESTRIL) 20 MG tablet Take 1 tablet by mouth 2 times daily, Disp-30 tablet, R-0Normal      metoprolol succinate (TOPROL XL) 100 MG extended release tablet Take 1 tablet by mouth every morning (before breakfast), Disp-30 tablet, R-0Normal      albuterol sulfate  (90 Base) MCG/ACT inhaler Inhale 2 puffs into the lungs every 4 hours as neededHistorical Med      levothyroxine (SYNTHROID) 150 MCG tablet Take 150 mcg by mouth DailyHistorical Med      NIFEdipine (ADALAT CC) 60 MG extended release tablet Take 60 mg by mouth dailyHistorical Med      isosorbide mononitrate (IMDUR) 120 MG extended release tablet Take 1 tablet by mouth daily, Disp-30 tablet, R-0Normal      DULoxetine (CYMBALTA) 60 MG extended release capsule Take 60 mg by mouth dailyHistorical Med      rOPINIRole (REQUIP) 2 MG tablet Take 4 mg by mouth nightly Indications: Restless Leg Syndrome Historical Med      simvastatin (ZOCOR) 40 MG tablet Take 40 mg by mouth nightly Historical Med      insulin aspart (NOVOLOG FLEXPEN) 100 UNIT/ML injection pen Inject 5 Units into the skin 3 times daily Historical Med             ALLERGIES     Penicillins, Lamisil advanced [terbinafine], Stadol [butorphanol], and Reglan [metoclopramide]    FAMILY HISTORY       Family History   Problem Relation Age of Onset    Colon Cancer Mother          at 61    Colon Polyps Mother     Lung Cancer Father          at 66    Colon Polyps Father     Stomach Cancer Sister     Breast Cancer Sister 27    Depression Daughter 25 Vitals   BP Temp Temp src Pulse Resp SpO2 Height Weight   -- -- -- -- -- -- -- --       Physical Exam  Vitals signs and nursing note reviewed. Constitutional:       Appearance: Normal appearance. She is well-developed. She is not ill-appearing. HENT:      Head: Normocephalic and atraumatic. Mouth/Throat:      Mouth: Mucous membranes are moist.      Pharynx: Oropharynx is clear. Eyes:      Conjunctiva/sclera: Conjunctivae normal.      Pupils: Pupils are equal, round, and reactive to light. Neck:      Musculoskeletal: Normal range of motion and neck supple. Cardiovascular:      Rate and Rhythm: Normal rate and regular rhythm. Heart sounds: Normal heart sounds. No murmur. No friction rub. No gallop. Pulmonary:      Effort: Pulmonary effort is normal.      Breath sounds: Normal breath sounds. No wheezing, rhonchi or rales. Abdominal:      General: Abdomen is flat. Bowel sounds are normal. There is no distension. Palpations: Abdomen is soft. Tenderness: There is no abdominal tenderness. There is no guarding or rebound. Musculoskeletal:         General: Tenderness and signs of injury present. No swelling or deformity. Left hip: She exhibits decreased range of motion (Secondary to pain), tenderness and bony tenderness. She exhibits normal strength. Comments: Range of motion of patient's lower extremity is limited secondary to pain. She has a hard time with active movement. She has somewhat better range of motion with passive attempts. Skin:     General: Skin is warm and dry. Capillary Refill: Capillary refill takes less than 2 seconds. Neurological:      General: No focal deficit present. Mental Status: She is alert and oriented to person, place, and time. Cranial Nerves: No cranial nerve deficit. Psychiatric:         Mood and Affect: Mood normal.         Behavior: Behavior normal.         Thought Content:  Thought content normal.         DIAGNOSTIC RESULTS     EKG: All EKG's areinterpreted by the Emergency Department Physician who either signs or Co-signs this chart in the absence of a cardiologist.        RADIOLOGY:  Non-plain film images such as CT, Ultrasound and MRI are read by the radiologist. Plain radiographic images are visualized and preliminarily interpreted bythe emergency physician with the below findings:    CT LEFT HIP:    No acute fracture. Anasarca. XR left hip:  No acute fx/dislocation    XR HIP 2-3 VW W PELVIS LEFT    (Results Pending)   CT HIP LEFT WO CONTRAST    (Results Pending)           LABS:  Labs Reviewed - No data to display    All other labs were within normal range or not returned as of this dictation. EMERGENCY DEPARTMENT COURSE and DIFFERENTIAL DIAGNOSIS/MDM:   Vitals:    Vitals:    03/13/21 0245 03/13/21 0345 03/13/21 0400 03/13/21 0500   BP: (!) 206/84 (!) 220/90 (!) 196/78 (!) 193/85   Pulse: 68 71 66 74   Resp: 18 18 18 18   Temp:       TempSrc:       SpO2: 93% 94% 95% 96%   Weight:       Height:           MDM  Number of Diagnoses or Management Options  Left hip pain: new and requires workup  Diagnosis management comments: Work-up this evening following patient's fall was essentially unremarkable. The original x-ray did not show an acute fracture. Patient was extremely tender and range of motion was severely reduced secondary to the pain. This led to the CT. CT was unremarkable did not show an acute fracture either. Given this suspect patient has sustained a contusion to her left hip. I will have her follow-up outpatient with PCP and with Ortho as needed.      Patient Progress  Patient progress: stable      Reassessment      Medications   cloNIDine (CATAPRES) tablet 0.3 mg (0 mg Oral Held 3/13/21 0330)   hydrALAZINE (APRESOLINE) tablet 75 mg (75 mg Oral Given 3/13/21 0331)   oxyCODONE-acetaminophen (PERCOCET) 5-325 MG per tablet 1 tablet (1 tablet Oral Given 3/12/21 2249)   LORazepam (ATIVAN) injection 1 mg (1 mg Intravenous Given 3/13/21 0037)   lisinopril (PRINIVIL;ZESTRIL) tablet 20 mg (20 mg Oral Given 3/13/21 0331)   oxyCODONE-acetaminophen (PERCOCET) 5-325 MG per tablet 1 tablet (1 tablet Oral Given 3/13/21 9018)       CONSULTS:  None:  Unless otherwise noted below, none     Procedures    FINAL IMPRESSION      1. Left hip pain    2. Essential hypertension          DISPOSITION/PLAN   DISPOSITION Decision To Discharge 03/13/2021 03:12:10 AM      PATIENT REFERRED TO:  ROMINA England  [address]   [phone]    Call in 2 days  For follow up    Tiana Vallejo MD  [address]  [phone]            DISCHARGE MEDICATIONS:  Discharge Medication List as of 3/13/2021  5:40 AM      START taking these medications    Details   orphenadrine (NORFLEX) 100 MG extended release tablet Take 1 tablet by mouth 2 times daily for 10 days, Disp-20 tablet, R-0Normal      HYDROcodone-acetaminophen (NORCO) 7.5-325 MG per tablet Take 1 tablet by mouth every 6 hours as needed for Pain for up to 3 days. Intended supply: 3 days.  Take lowest dose possible to manage pain, Disp-12 tablet, R-0Normal                (Please note that portions of this note were completed with a voice recognition program.  Efforts were made to edit thedictations but occasionally words are mis-transcribed.)    Shyanne Tang MD (electronically signed)Emergency Physician          Garett Herrera MD  03/13/21 3989

## 2021-03-13 NOTE — ED NOTES
Physician aware of pt BP and pt supposed to be going to dialysis this morning. Dr. Ibarra Nicely comfortable with pt being discharged to go to dialysis.      Jules, [address] [facility]  03/13/21 8915

## 2021-03-13 NOTE — ED NOTES
Cab called for pt ride to dialysis. ETA of 15 min.      [facility]  03/13/21 0815

## 2021-04-05 ENCOUNTER — HOSPITAL ENCOUNTER (OUTPATIENT)
Dept: WOMENS IMAGING | Age: 51
Discharge: HOME | End: 2021-04-05
Payer: MEDICARE

## 2021-04-05 DIAGNOSIS — N64.52 BLOODY DISCHARGE FROM NIPPLE: ICD-10-CM

## 2021-04-05 PROCEDURE — 76642 ULTRASOUND BREAST LIMITED: CPT

## 2021-04-12 NOTE — PROGRESS NOTES
Medically noncompliant 04/20/2019    Noncompliance with medications     Left homonymous hemianopsia     Acute intractable headache     Volume overload 03/27/2019    Hyponatremia 01/26/2019    Hyperglycemia due to type 2 diabetes mellitus ([address] 01/26/2019    Normocytic anemia 01/26/2019    ESRD on hemodialysis ([address] 01/25/2019    Steal syndrome as complication of dialysis access ([address] 12/19/2018    PVD (peripheral vascular disease) ([address]     High hepatic iron concentration determined by biopsy of liver 12/05/2018    ESRD (end stage renal disease) on dialysis [facility])     Abnormal CT of liver 08/15/2018    Other ascites 08/15/2018    Gastroparesis 08/08/2018    Nausea and vomiting 08/08/2018    Chronic constipation 08/08/2018    Uncontrolled type 2 diabetes mellitus with complication, with long-term current use of insulin ([address] 08/08/2018    Dialysis AV fistula malfunction ([address] 06/27/2018    Acute respiratory failure with hypoxia and hypercapnia (HCC) 03/07/2018    Respiratory failure ([address] 03/05/2018    Type 2 diabetes mellitus with chronic kidney disease on chronic dialysis, with long-term current use of insulin ([address] 12/09/2017    Noncompliance of patient with renal dialysis ([address]     Family history of colon cancer 10/20/2017    Unintentional weight loss 10/20/2017    Anemia in chronic kidney disease (CKD) 05/23/2017    Iron deficiency 05/23/2017    Gastroesophageal reflux disease without esophagitis     Acquired hypothyroidism      Current Outpatient Medications   Medication Sig Dispense Refill    insulin glargine (LANTUS SOLOSTAR) 100 UNIT/ML injection pen Inject 40 Units into the skin daily 5 pen 3    levETIRAcetam (KEPPRA) 500 MG tablet Take 1 tablet by mouth daily 60 tablet 3    hydrALAZINE (APRESOLINE) 50 MG tablet Take 1.5 tablets by mouth every 8 hours 90 tablet 0    hydroCHLOROthiazide (HYDRODIURIL) 25 MG tablet Take 2 tablets by mouth daily 30 tablet 3    ondansetron (ZOFRAN-ODT) 4 MG disintegrating tablet Take 4 mg by mouth every 8 hours as needed for Nausea or Vomiting      Ergocalciferol (VITAMIN D2) 10 MCG (400 UNIT) TABS Take 1.25 mg by mouth every 14 days      senna (SENNA-LAX) 8.6 MG tablet Take 1 tablet by mouth daily      linaclotide (LINZESS) 290 MCG CAPS capsule Take 1 capsule by mouth every morning (before breakfast) 30 capsule 11    cloNIDine (CATAPRES) 0.3 MG tablet Take 1 tablet by mouth every 8 hours 60 tablet 3    sevelamer (RENVELA) 800 MG tablet Take 1 tablet by mouth 3 times daily (with meals)      lisinopril (PRINIVIL;ZESTRIL) 20 MG tablet Take 1 tablet by mouth 2 times daily 30 tablet 0    metoprolol succinate (TOPROL XL) 100 MG extended release tablet Take 1 tablet by mouth every morning (before breakfast) 30 tablet 0    albuterol sulfate  (90 Base) MCG/ACT inhaler Inhale 2 puffs into the lungs every 4 hours as needed      levothyroxine (SYNTHROID) 150 MCG tablet Take 150 mcg by mouth Daily      NIFEdipine (ADALAT CC) 60 MG extended release tablet Take 60 mg by mouth daily      isosorbide mononitrate (IMDUR) 120 MG extended release tablet Take 1 tablet by mouth daily 30 tablet 0    DULoxetine (CYMBALTA) 60 MG extended release capsule Take 60 mg by mouth daily      rOPINIRole (REQUIP) 2 MG tablet Take 4 mg by mouth nightly Indications: Restless Leg Syndrome       simvastatin (ZOCOR) 40 MG tablet Take 40 mg by mouth nightly       insulin aspart (NOVOLOG FLEXPEN) 100 UNIT/ML injection pen Inject 5 Units into the skin 3 times daily       nicotine (NICODERM CQ) 14 MG/24HR Place 1 patch onto the skin daily for 14 days 14 patch 5     No current facility-administered medications for this visit.       Allergies: Penicillins, Lamisil advanced [terbinafine], Stadol [butorphanol], and Reglan [metoclopramide]  Past Medical History:   Diagnosis Date    Arthritis     Chronic kidney disease, stage 5, kidney failure (HCC)     Closed fracture of right shoulder girdle, with routine healing, subsequent encounter     DM (diabetes mellitus) type II controlled with renal manifestation ([facility] [address] 06/1993    Gastroparesis     GERD (gastroesophageal reflux disease)     Hemodialysis patient ([facility] [address]     dialysis on tues, thur, and sat [facility]    Hypertension     Hypothyroid     Nasal congestion     recent    Noncompliance with medications     Palliative care patient 10/08/2019    Restless leg syndrome      Past Surgical History:   Procedure Laterality Date    BREAST SURGERY Left 2008    infected milk gland removed    CHOLECYSTECTOMY, LAPAROSCOPIC      2008    COLONOSCOPY Left 9/17/2020    Dr Rosales Kebede hepatic flexure-Severe tortuosity with severe spasming, 1 yr recall    DIALYSIS FISTULA CREATION Left 1/25/2019    REVISION LEFT UPPER EXTREMITY AV ACCESS WITH LEFT BRACHIAL ARTERY TO LEFT AXILLARY VEIN WITH  INTERPOSITIONAL ARTEGRAFT   performed by Lizett Gutierrez MD at [address]  2012    [address] Right 1/25/2019    INSERTION OF RIGHT INTERNAL JUGULAR HEMODIALYSIS CATHETER performed by Lizett Gutierrez MD at [facility]  08/17/2018    1.  Moderately increased stainable iron identified by special stain in Kupffer cells and occasional hepatocytes. Negative for evidence of significant portal or lobular inflammation. Negative for evidence of significant fibrosis    VT COLONOSCOPY W/BIOPSY SINGLE/MULTIPLE N/A 10/20/2017    Dr Jenny Parra prep-Tubular AP (-) dysplasia x 2--3 yr recall    VT EGD TRANSORAL BIOPSY SINGLE/MULTIPLE N/A 12/27/2016    Dr Art Cox EGD TRANSORAL BIOPSY SINGLE/MULTIPLE N/A 10/20/2017    Dr Arminda Dutta (-)   [facility] Left 2016    fistula by Dr Rivka Greenfield at [address]  10/02/2017    SJS. Left upper fistulograms/venograms, balloon angioplasty cephalic vein arch [id number] conquest.    VASCULAR SURGERY  08/2018    FISTULOGRAM    VASCULAR SURGERY  10/29/2018    SJS. Left upper fistulograms/venograms    VASCULAR SURGERY  2018    SJS. Arch aortogram,left upper arteriograms.  VASCULAR SURGERY  2019    SJS. Removal of tunneled dialyis catheter right internal jugular vein.  VASCULAR SURGERY  2019    SJS. Left upper extremity fistulogram including venography to the superior vena cava. Balloon angioplasty left subclavian vein with 10mm x 40mm conquest balloon. Balloon angioplasty mid/proximal upper arm cephalic vein with 86XH x 40mm conquest balloon. Venograms after balloon angioplasty. Stent left mid/proximal upper arm cephalic vein stenosis fluency 10mm x 60mm self-expanding covered stent. Balloon     VASCULAR SURGERY  2019    cont angioplasty stent with 10mm x 40mm conquest balloon. Completion venograms left upper extremity. Family History   Problem Relation Age of Onset    Colon Cancer Mother          at 63    Colon Polyps Mother     Lung Cancer Father          at 79    Colon Polyps Father     Stomach Cancer Sister     Breast Cancer Sister 27    Depression Daughter 25        committed suicide    Liver Cancer Neg Hx     Liver Disease Neg Hx     Esophageal Cancer Neg Hx     Rectal Cancer Neg Hx      Social History     Tobacco Use    Smoking status: Current Every Day Smoker     Packs/day: 0.50     Years: 33.00     Pack years: 16.50     Types: Cigarettes    Smokeless tobacco: Never Used    Tobacco comment: NOW at 1/4 PD, started at age 24 and has averaged 2 PPD   Substance Use Topics    Alcohol use: No       ROS:  14 point review of systems is negative except for the above. PHYSICAL EXAM:    The patient is a 45 y.o. female  in no acute distress. She appears much older than her stated age. She is alert oriented and cooperative. Mood and affect are appropriate. Skin is warm and dry without rashes.   She is quite chronically ill-appearing    BP (!) 90/50 (Site: Right Upper Arm, Position: Sitting, Cuff Size: Medium Adult)   Pulse 62       HEENT: Normocephalic and atraumatic. EOMs intact. Pupils equal and round and reactive to light and accommodation. External ears and nose are normal.  Sclera nonicteric. Conjunctiva normal  Oropharynx without masses or lesions. Neck: Neck is supple without masses or thyromegaly    Chest: Lungs are clear to auscultation. Respiratory effort normal    Cardiac: Regular rate and rhythm without rubs, murmurs, or gallops    Breasts: Carlos Saleh  has unremarkable fibrocystic changes throughout both breasts. There are no dominant masses, no skin or nipple changes and no axillary adenopathy. I see nothing suspicious on physical examination. She does have 6 some milky drainage from multiple ducts in the right breast but serous drainage from a single duct which appears pathologic. Abdomen: The abdomen is soft and nontender with no hepatosplenomegaly. There are no abdominal hernias noted. Extremities: The extremities are normal. There are no signs of clubbing, cyanosis, or edema. She has a dialysis fistula in her left upper arm    IMPRESSION:   Right Nipple Drainage      PLAN: Apply New Skin for one month. And we will plan for surgery on or about May 14. Wedge excision of a right breast duct with lacrimal duct probes    I have seen, examined and reviewed this patient medication list, appropriate labs and imaging studies. I reviewed relevant medical records and others physicians notes. I discussed the plans of care with the patient. I answered all the questions to the patients satisfaction. I, Dr Remy James, personally performed the services described in this documentation as scribed by Caleb Will MA in my presence and is both accurate and complete.      (Please note that portions of this note were completed with a voice recognition program. Efforts were made to edit the dictations but occasionally words are mis-transcribed.)  Over 50% of the total visit time of 25 minutes in face to face encounter with the patient, out of which more than 50% of the time was spent in counseling patient or family and coordination of care. Counseling included but was not limited to time spent reviewing labs, imaging studies/ treatment plan and answering questions.

## 2021-04-14 ENCOUNTER — OFFICE VISIT (OUTPATIENT)
Dept: SURGERY | Age: 51
End: 2021-04-14
Payer: MEDICARE

## 2021-04-14 VITALS — DIASTOLIC BLOOD PRESSURE: 50 MMHG | SYSTOLIC BLOOD PRESSURE: 90 MMHG | HEART RATE: 62 BPM

## 2021-04-14 DIAGNOSIS — N64.52 DISCHARGE FROM NIPPLE: ICD-10-CM

## 2021-04-14 PROCEDURE — 99214 OFFICE O/P EST MOD 30 MIN: CPT | Performed by: SURGERY

## 2021-04-29 ENCOUNTER — APPOINTMENT (OUTPATIENT)
Dept: GENERAL RADIOLOGY | Age: 51
DRG: 304 | End: 2021-04-29
Payer: MEDICARE

## 2021-04-29 ENCOUNTER — APPOINTMENT (OUTPATIENT)
Dept: CT IMAGING | Age: 51
DRG: 304 | End: 2021-04-29
Payer: MEDICARE

## 2021-04-29 ENCOUNTER — HOSPITAL ENCOUNTER (INPATIENT)
Age: 51
LOS: 2 days | Discharge: HOME | DRG: 304 | End: 2021-05-01
Attending: EMERGENCY MEDICINE | Admitting: HOSPITALIST
Payer: MEDICARE

## 2021-04-29 DIAGNOSIS — R94.31 ABNORMAL EKG: ICD-10-CM

## 2021-04-29 DIAGNOSIS — N18.6 END STAGE RENAL DISEASE (HCC): ICD-10-CM

## 2021-04-29 DIAGNOSIS — R77.8 ELEVATED TROPONIN: ICD-10-CM

## 2021-04-29 DIAGNOSIS — R73.9 HYPERGLYCEMIA: ICD-10-CM

## 2021-04-29 DIAGNOSIS — S22.42XA CLOSED FRACTURE OF MULTIPLE RIBS OF LEFT SIDE, INITIAL ENCOUNTER: Primary | ICD-10-CM

## 2021-04-29 LAB
ALBUMIN SERPL-MCNC: 3.6 G/DL (ref 3.5–5.2)
ALP BLD-CCNC: 101 U/L (ref 35–104)
ALT SERPL-CCNC: 9 U/L (ref 5–33)
ANION GAP SERPL CALCULATED.3IONS-SCNC: 18 MMOL/L (ref 7–19)
AST SERPL-CCNC: 11 U/L (ref 5–32)
BASOPHILS ABSOLUTE: 0.1 K/UL (ref 0–0.2)
BASOPHILS RELATIVE PERCENT: 1.3 % (ref 0–1)
BILIRUB SERPL-MCNC: 0.4 MG/DL (ref 0.2–1.2)
BUN BLDV-MCNC: 60 MG/DL (ref 6–20)
CALCIUM SERPL-MCNC: 10 MG/DL (ref 8.6–10)
CHLORIDE BLD-SCNC: 81 MMOL/L (ref 98–111)
CO2: 24 MMOL/L (ref 22–29)
CREAT SERPL-MCNC: 6.3 MG/DL (ref 0.5–0.9)
EOSINOPHILS ABSOLUTE: 0.1 K/UL (ref 0–0.6)
EOSINOPHILS RELATIVE PERCENT: 1.5 % (ref 0–5)
GFR AFRICAN AMERICAN: 8
GFR NON-AFRICAN AMERICAN: 7
GLUCOSE BLD-MCNC: 179 MG/DL (ref 70–99)
GLUCOSE BLD-MCNC: 352 MG/DL (ref 70–99)
GLUCOSE BLD-MCNC: 528 MG/DL (ref 70–99)
GLUCOSE BLD-MCNC: 695 MG/DL (ref 74–109)
HCT VFR BLD CALC: 33.3 % (ref 37–47)
HEMOGLOBIN: 10.2 G/DL (ref 12–16)
IMMATURE GRANULOCYTES #: 0 K/UL
LYMPHOCYTES ABSOLUTE: 0.7 K/UL (ref 1.1–4.5)
LYMPHOCYTES RELATIVE PERCENT: 11.3 % (ref 20–40)
MCH RBC QN AUTO: 29.5 PG (ref 27–31)
MCHC RBC AUTO-ENTMCNC: 30.6 G/DL (ref 33–37)
MCV RBC AUTO: 96.2 FL (ref 81–99)
MONOCYTES ABSOLUTE: 0.5 K/UL (ref 0–0.9)
MONOCYTES RELATIVE PERCENT: 7.7 % (ref 0–10)
NEUTROPHILS ABSOLUTE: 4.8 K/UL (ref 1.5–7.5)
NEUTROPHILS RELATIVE PERCENT: 77.7 % (ref 50–65)
PDW BLD-RTO: 18.9 % (ref 11.5–14.5)
PERFORMED ON: ABNORMAL
PLATELET # BLD: 191 K/UL (ref 130–400)
PMV BLD AUTO: 9.6 FL (ref 9.4–12.3)
POTASSIUM REFLEX MAGNESIUM: 4.5 MMOL/L (ref 3.5–5)
RBC # BLD: 3.46 M/UL (ref 4.2–5.4)
SARS-COV-2, NAAT: NOT DETECTED
SODIUM BLD-SCNC: 123 MMOL/L (ref 136–145)
TOTAL PROTEIN: 7.4 G/DL (ref 6.6–8.7)
TROPONIN: 0.2 NG/ML (ref 0–0.03)
TROPONIN: 0.22 NG/ML (ref 0–0.03)
WBC # BLD: 6.1 K/UL (ref 4.8–10.8)

## 2021-04-29 PROCEDURE — 87635 SARS-COV-2 COVID-19 AMP PRB: CPT

## 2021-04-29 PROCEDURE — 74177 CT ABD & PELVIS W/CONTRAST: CPT

## 2021-04-29 PROCEDURE — 6370000000 HC RX 637 (ALT 250 FOR IP)

## 2021-04-29 PROCEDURE — 85025 COMPLETE CBC W/AUTO DIFF WBC: CPT

## 2021-04-29 PROCEDURE — 2580000003 HC RX 258: Performed by: HOSPITALIST

## 2021-04-29 PROCEDURE — 2500000003 HC RX 250 WO HCPCS: Performed by: EMERGENCY MEDICINE

## 2021-04-29 PROCEDURE — 36415 COLL VENOUS BLD VENIPUNCTURE: CPT

## 2021-04-29 PROCEDURE — G0378 HOSPITAL OBSERVATION PER HR: HCPCS

## 2021-04-29 PROCEDURE — 84484 ASSAY OF TROPONIN QUANT: CPT

## 2021-04-29 PROCEDURE — 1210000000 HC MED SURG R&B

## 2021-04-29 PROCEDURE — 96375 TX/PRO/DX INJ NEW DRUG ADDON: CPT

## 2021-04-29 PROCEDURE — 6360000002 HC RX W HCPCS: Performed by: EMERGENCY MEDICINE

## 2021-04-29 PROCEDURE — 71260 CT THORAX DX C+: CPT

## 2021-04-29 PROCEDURE — 93005 ELECTROCARDIOGRAM TRACING: CPT | Performed by: EMERGENCY MEDICINE

## 2021-04-29 PROCEDURE — 80053 COMPREHEN METABOLIC PANEL: CPT

## 2021-04-29 PROCEDURE — 82947 ASSAY GLUCOSE BLOOD QUANT: CPT

## 2021-04-29 PROCEDURE — 8010000000 HC HEMODIALYSIS ACUTE INPT

## 2021-04-29 PROCEDURE — 96374 THER/PROPH/DIAG INJ IV PUSH: CPT

## 2021-04-29 PROCEDURE — 96376 TX/PRO/DX INJ SAME DRUG ADON: CPT

## 2021-04-29 PROCEDURE — 96365 THER/PROPH/DIAG IV INF INIT: CPT

## 2021-04-29 PROCEDURE — 96366 THER/PROPH/DIAG IV INF ADDON: CPT

## 2021-04-29 PROCEDURE — 6370000000 HC RX 637 (ALT 250 FOR IP): Performed by: HOSPITALIST

## 2021-04-29 PROCEDURE — 73560 X-RAY EXAM OF KNEE 1 OR 2: CPT

## 2021-04-29 PROCEDURE — 6370000000 HC RX 637 (ALT 250 FOR IP): Performed by: EMERGENCY MEDICINE

## 2021-04-29 PROCEDURE — 6360000002 HC RX W HCPCS: Performed by: HOSPITALIST

## 2021-04-29 PROCEDURE — 99284 EMERGENCY DEPT VISIT MOD MDM: CPT

## 2021-04-29 PROCEDURE — 73502 X-RAY EXAM HIP UNI 2-3 VIEWS: CPT

## 2021-04-29 PROCEDURE — 6360000004 HC RX CONTRAST MEDICATION: Performed by: EMERGENCY MEDICINE

## 2021-04-29 PROCEDURE — 6370000000 HC RX 637 (ALT 250 FOR IP): Performed by: INTERNAL MEDICINE

## 2021-04-29 RX ORDER — ISOSORBIDE MONONITRATE 60 MG/1
120 TABLET, EXTENDED RELEASE ORAL DAILY
Status: DISCONTINUED | OUTPATIENT
Start: 2021-04-29 | End: 2021-05-01 | Stop reason: HOSPADM

## 2021-04-29 RX ORDER — SODIUM CHLORIDE 9 MG/ML
25 INJECTION, SOLUTION INTRAVENOUS PRN
Status: DISCONTINUED | OUTPATIENT
Start: 2021-04-29 | End: 2021-05-01

## 2021-04-29 RX ORDER — NITROGLYCERIN 20 MG/100ML
5-200 INJECTION INTRAVENOUS CONTINUOUS
Status: DISCONTINUED | OUTPATIENT
Start: 2021-04-29 | End: 2021-04-29

## 2021-04-29 RX ORDER — SODIUM CHLORIDE 0.9 % (FLUSH) 0.9 %
5-40 SYRINGE (ML) INJECTION EVERY 12 HOURS SCHEDULED
Status: DISCONTINUED | OUTPATIENT
Start: 2021-04-29 | End: 2021-05-01

## 2021-04-29 RX ORDER — LISINOPRIL 10 MG/1
20 TABLET ORAL 2 TIMES DAILY
Status: DISCONTINUED | OUTPATIENT
Start: 2021-04-29 | End: 2021-05-01 | Stop reason: HOSPADM

## 2021-04-29 RX ORDER — SEVELAMER CARBONATE 800 MG/1
800 TABLET, FILM COATED ORAL
Status: DISCONTINUED | OUTPATIENT
Start: 2021-04-29 | End: 2021-05-01 | Stop reason: HOSPADM

## 2021-04-29 RX ORDER — SENNA PLUS 8.6 MG/1
1 TABLET ORAL DAILY
Status: DISCONTINUED | OUTPATIENT
Start: 2021-04-29 | End: 2021-05-01 | Stop reason: HOSPADM

## 2021-04-29 RX ORDER — POLYETHYLENE GLYCOL 3350 17 G/17G
17 POWDER, FOR SOLUTION ORAL DAILY PRN
Status: DISCONTINUED | OUTPATIENT
Start: 2021-04-29 | End: 2021-05-01 | Stop reason: HOSPADM

## 2021-04-29 RX ORDER — METOPROLOL SUCCINATE 50 MG/1
100 TABLET, EXTENDED RELEASE ORAL
Status: DISCONTINUED | OUTPATIENT
Start: 2021-04-30 | End: 2021-05-01 | Stop reason: HOSPADM

## 2021-04-29 RX ORDER — LACTULOSE 10 G/15ML
20 SOLUTION ORAL 3 TIMES DAILY
Status: DISCONTINUED | OUTPATIENT
Start: 2021-04-29 | End: 2021-05-01 | Stop reason: HOSPADM

## 2021-04-29 RX ORDER — NITROGLYCERIN 0.4 MG/1
0.4 TABLET SUBLINGUAL EVERY 5 MIN PRN
Status: DISCONTINUED | OUTPATIENT
Start: 2021-04-29 | End: 2021-05-01 | Stop reason: HOSPADM

## 2021-04-29 RX ORDER — HEPARIN SODIUM 5000 [USP'U]/ML
5000 INJECTION, SOLUTION INTRAVENOUS; SUBCUTANEOUS EVERY 8 HOURS SCHEDULED
Status: DISCONTINUED | OUTPATIENT
Start: 2021-04-29 | End: 2021-05-01 | Stop reason: HOSPADM

## 2021-04-29 RX ORDER — MORPHINE SULFATE 4 MG/ML
4 INJECTION, SOLUTION INTRAMUSCULAR; INTRAVENOUS ONCE
Status: COMPLETED | OUTPATIENT
Start: 2021-04-29 | End: 2021-04-29

## 2021-04-29 RX ORDER — DULOXETIN HYDROCHLORIDE 30 MG/1
60 CAPSULE, DELAYED RELEASE ORAL DAILY
Status: DISCONTINUED | OUTPATIENT
Start: 2021-04-29 | End: 2021-05-01 | Stop reason: HOSPADM

## 2021-04-29 RX ORDER — DEXTROSE MONOHYDRATE 50 MG/ML
100 INJECTION, SOLUTION INTRAVENOUS PRN
Status: DISCONTINUED | OUTPATIENT
Start: 2021-04-29 | End: 2021-05-01 | Stop reason: HOSPADM

## 2021-04-29 RX ORDER — ONDANSETRON 2 MG/ML
4 INJECTION INTRAMUSCULAR; INTRAVENOUS EVERY 6 HOURS PRN
Status: DISCONTINUED | OUTPATIENT
Start: 2021-04-29 | End: 2021-05-01 | Stop reason: HOSPADM

## 2021-04-29 RX ORDER — NICOTINE POLACRILEX 4 MG
15 LOZENGE BUCCAL PRN
Status: DISCONTINUED | OUTPATIENT
Start: 2021-04-29 | End: 2021-05-01 | Stop reason: HOSPADM

## 2021-04-29 RX ORDER — DEXTROSE MONOHYDRATE 25 G/50ML
12.5 INJECTION, SOLUTION INTRAVENOUS PRN
Status: DISCONTINUED | OUTPATIENT
Start: 2021-04-29 | End: 2021-05-01 | Stop reason: HOSPADM

## 2021-04-29 RX ORDER — NIFEDIPINE 60 MG/1
60 TABLET, EXTENDED RELEASE ORAL DAILY
Status: DISCONTINUED | OUTPATIENT
Start: 2021-04-29 | End: 2021-05-01 | Stop reason: HOSPADM

## 2021-04-29 RX ORDER — ROPINIROLE 4 MG/1
4 TABLET, FILM COATED ORAL NIGHTLY
Status: DISCONTINUED | OUTPATIENT
Start: 2021-04-29 | End: 2021-05-01 | Stop reason: HOSPADM

## 2021-04-29 RX ORDER — LEVOTHYROXINE SODIUM 0.07 MG/1
150 TABLET ORAL DAILY
Status: DISCONTINUED | OUTPATIENT
Start: 2021-04-30 | End: 2021-05-01 | Stop reason: HOSPADM

## 2021-04-29 RX ORDER — ONDANSETRON 2 MG/ML
4 INJECTION INTRAMUSCULAR; INTRAVENOUS ONCE
Status: COMPLETED | OUTPATIENT
Start: 2021-04-29 | End: 2021-04-29

## 2021-04-29 RX ORDER — NICOTINE 21 MG/24HR
1 PATCH, TRANSDERMAL 24 HOURS TRANSDERMAL DAILY
Status: DISCONTINUED | OUTPATIENT
Start: 2021-04-29 | End: 2021-05-01 | Stop reason: HOSPADM

## 2021-04-29 RX ORDER — ACETAMINOPHEN 325 MG/1
650 TABLET ORAL
Status: COMPLETED | OUTPATIENT
Start: 2021-04-29 | End: 2021-04-29

## 2021-04-29 RX ORDER — PROMETHAZINE HYDROCHLORIDE 25 MG/1
12.5 TABLET ORAL EVERY 6 HOURS PRN
Status: DISCONTINUED | OUTPATIENT
Start: 2021-04-29 | End: 2021-05-01 | Stop reason: HOSPADM

## 2021-04-29 RX ORDER — ATORVASTATIN CALCIUM 20 MG/1
20 TABLET, FILM COATED ORAL DAILY
Status: DISCONTINUED | OUTPATIENT
Start: 2021-04-29 | End: 2021-05-01 | Stop reason: HOSPADM

## 2021-04-29 RX ORDER — HYDROCODONE BITARTRATE AND ACETAMINOPHEN 5; 325 MG/1; MG/1
1 TABLET ORAL EVERY 6 HOURS PRN
Status: DISCONTINUED | OUTPATIENT
Start: 2021-04-29 | End: 2021-05-01 | Stop reason: HOSPADM

## 2021-04-29 RX ORDER — SODIUM CHLORIDE 0.9 % (FLUSH) 0.9 %
5-40 SYRINGE (ML) INJECTION PRN
Status: DISCONTINUED | OUTPATIENT
Start: 2021-04-29 | End: 2021-05-01

## 2021-04-29 RX ORDER — INSULIN GLARGINE 100 [IU]/ML
20 INJECTION, SOLUTION SUBCUTANEOUS NIGHTLY
Status: DISCONTINUED | OUTPATIENT
Start: 2021-04-29 | End: 2021-04-30

## 2021-04-29 RX ORDER — ACETAMINOPHEN 325 MG/1
650 TABLET ORAL EVERY 6 HOURS PRN
Status: DISCONTINUED | OUTPATIENT
Start: 2021-04-29 | End: 2021-05-01 | Stop reason: HOSPADM

## 2021-04-29 RX ORDER — MAGNESIUM SULFATE IN WATER 40 MG/ML
2000 INJECTION, SOLUTION INTRAVENOUS PRN
Status: DISCONTINUED | OUTPATIENT
Start: 2021-04-29 | End: 2021-05-01 | Stop reason: HOSPADM

## 2021-04-29 RX ORDER — HYDROCHLOROTHIAZIDE 25 MG/1
50 TABLET ORAL DAILY
Status: DISCONTINUED | OUTPATIENT
Start: 2021-04-29 | End: 2021-05-01 | Stop reason: HOSPADM

## 2021-04-29 RX ORDER — ACETAMINOPHEN 650 MG/1
650 SUPPOSITORY RECTAL EVERY 6 HOURS PRN
Status: DISCONTINUED | OUTPATIENT
Start: 2021-04-29 | End: 2021-05-01 | Stop reason: HOSPADM

## 2021-04-29 RX ORDER — HYDROCODONE BITARTRATE AND ACETAMINOPHEN 5; 325 MG/1; MG/1
TABLET ORAL
Status: COMPLETED
Start: 2021-04-29 | End: 2021-04-29

## 2021-04-29 RX ORDER — IPRATROPIUM BROMIDE AND ALBUTEROL SULFATE 2.5; .5 MG/3ML; MG/3ML
1 SOLUTION RESPIRATORY (INHALATION)
Status: DISCONTINUED | OUTPATIENT
Start: 2021-04-29 | End: 2021-05-01 | Stop reason: HOSPADM

## 2021-04-29 RX ADMIN — ACETAMINOPHEN 650 MG: 325 TABLET ORAL at 16:07

## 2021-04-29 RX ADMIN — HYDRALAZINE HYDROCHLORIDE 75 MG: 50 TABLET, FILM COATED ORAL at 21:19

## 2021-04-29 RX ADMIN — IOPAMIDOL 90 ML: 755 INJECTION, SOLUTION INTRAVENOUS at 07:27

## 2021-04-29 RX ADMIN — HYDROCODONE BITARTRATE AND ACETAMINOPHEN 1 TABLET: 5; 325 TABLET ORAL at 18:32

## 2021-04-29 RX ADMIN — LACTULOSE 20 G: 20 SOLUTION ORAL at 21:20

## 2021-04-29 RX ADMIN — NITROGLYCERIN 0.4 MG: 0.4 TABLET, ORALLY DISINTEGRATING SUBLINGUAL at 08:11

## 2021-04-29 RX ADMIN — NITROGLYCERIN 5 MCG/MIN: 20 INJECTION INTRAVENOUS at 11:14

## 2021-04-29 RX ADMIN — INSULIN HUMAN 10 UNITS: 100 INJECTION, SOLUTION PARENTERAL at 10:34

## 2021-04-29 RX ADMIN — INSULIN GLARGINE 20 UNITS: 100 INJECTION, SOLUTION SUBCUTANEOUS at 22:40

## 2021-04-29 RX ADMIN — LISINOPRIL 20 MG: 10 TABLET ORAL at 21:20

## 2021-04-29 RX ADMIN — HEPARIN SODIUM 5000 UNITS: 5000 INJECTION INTRAVENOUS; SUBCUTANEOUS at 21:21

## 2021-04-29 RX ADMIN — HYDROCHLOROTHIAZIDE 50 MG: 25 TABLET ORAL at 21:20

## 2021-04-29 RX ADMIN — INSULIN LISPRO 7 UNITS: 100 INJECTION, SOLUTION INTRAVENOUS; SUBCUTANEOUS at 22:40

## 2021-04-29 RX ADMIN — Medication 10 ML: at 21:21

## 2021-04-29 RX ADMIN — DULOXETINE HYDROCHLORIDE 60 MG: 30 CAPSULE, DELAYED RELEASE ORAL at 21:19

## 2021-04-29 RX ADMIN — INSULIN HUMAN 10 UNITS: 100 INJECTION, SOLUTION PARENTERAL at 08:08

## 2021-04-29 RX ADMIN — STANDARDIZED SENNA CONCENTRATE 8.6 MG: 8.6 TABLET ORAL at 21:19

## 2021-04-29 RX ADMIN — ISOSORBIDE MONONITRATE 120 MG: 60 TABLET, EXTENDED RELEASE ORAL at 21:20

## 2021-04-29 RX ADMIN — ONDANSETRON HYDROCHLORIDE 4 MG: 2 SOLUTION INTRAMUSCULAR; INTRAVENOUS at 07:13

## 2021-04-29 RX ADMIN — SEVELAMER CARBONATE 800 MG: 800 TABLET, FILM COATED ORAL at 21:19

## 2021-04-29 RX ADMIN — MORPHINE SULFATE 4 MG: 4 INJECTION, SOLUTION INTRAMUSCULAR; INTRAVENOUS at 07:15

## 2021-04-29 RX ADMIN — ROPINIROLE HYDROCHLORIDE 4 MG: 4 TABLET, FILM COATED ORAL at 21:20

## 2021-04-29 RX ADMIN — ONDANSETRON HYDROCHLORIDE 4 MG: 2 SOLUTION INTRAMUSCULAR; INTRAVENOUS at 21:21

## 2021-04-29 RX ADMIN — NIFEDIPINE 60 MG: 60 TABLET, EXTENDED RELEASE ORAL at 21:20

## 2021-04-29 RX ADMIN — ATORVASTATIN CALCIUM 20 MG: 20 TABLET, FILM COATED ORAL at 21:20

## 2021-04-29 ASSESSMENT — ENCOUNTER SYMPTOMS
DIARRHEA: 0
VOMITING: 1
SHORTNESS OF BREATH: 1
ABDOMINAL PAIN: 1
RHINORRHEA: 0
NAUSEA: 1
BACK PAIN: 0
SORE THROAT: 0

## 2021-04-29 ASSESSMENT — PAIN SCALES - GENERAL
PAINLEVEL_OUTOF10: 8
PAINLEVEL_OUTOF10: 10

## 2021-04-29 NOTE — ED NOTES
Spoke with Dr Misael Alvarado, pt not on nitro drip at this time and bp stable in the 150's SBP.   As long as pt receives regular bp meds after HD pt can go to regular room     Jeancarlos Caruso RN  04/29/21 2696

## 2021-04-29 NOTE — H&P
right shoulder girdle, with routine healing, subsequent encounter     DM (diabetes mellitus) type II controlled with renal manifestation ([facility] [address] 06/1993    Gastroparesis     GERD (gastroesophageal reflux disease)     Hemodialysis patient ([facility] [address]     dialysis on tues, thur, and sat at [facility]    Hypertension     Hypothyroid     Nasal congestion     recent    Noncompliance with medications     Palliative care patient 10/08/2019    Restless leg syndrome         Past Surgical History     Past Surgical History:   Procedure Laterality Date    BREAST SURGERY Left 2008    infected milk gland removed    CHOLECYSTECTOMY, LAPAROSCOPIC      2008    COLONOSCOPY Left 9/17/2020    Dr Edouard Pinto hepatic flexure-Severe tortuosity with severe spasming, 1 yr recall    DIALYSIS FISTULA CREATION Left 1/25/2019    REVISION LEFT UPPER EXTREMITY AV ACCESS WITH LEFT BRACHIAL ARTERY TO LEFT AXILLARY VEIN WITH  INTERPOSITIONAL ARTEGRAFT   performed by Homer Rosado MD at [address]  2012    [address] Right 1/25/2019    INSERTION OF RIGHT INTERNAL JUGULAR HEMODIALYSIS CATHETER performed by Homer Rosado MD at [address] [facility]  08/17/2018    1.  Moderately increased stainable iron identified by special stain in Kupffer cells and occasional hepatocytes. Negative for evidence of significant portal or lobular inflammation. Negative for evidence of significant fibrosis    CT COLONOSCOPY W/BIOPSY SINGLE/MULTIPLE N/A 10/20/2017    Dr Tamika Hayes prep-Tubular AP (-) dysplasia x 2--3 yr recall    CT EGD TRANSORAL BIOPSY SINGLE/MULTIPLE N/A 12/27/2016    Dr Raman Oconnor EGD TRANSORAL BIOPSY SINGLE/MULTIPLE N/A 10/20/2017    Dr Salazar Ly (-)   [facility] VASCULAR SURGERY Left 2016    fistula by Dr Radha Rubio at [address]  10/02/2017    [facility]. Left upper fistulograms/venograms, balloon angioplasty cephalic vein arch 42H54 conquest.    VASCULAR SURGERY 08/2018    FISTULOGRAM    VASCULAR SURGERY  10/29/2018    [facility]. Left upper fistulograms/venograms    VASCULAR SURGERY  12/19/2018    [facility]. Arch aortogram,left upper arteriograms.  VASCULAR SURGERY  03/06/2019    [facility]. Removal of tunneled dialyis catheter right internal jugular vein.  VASCULAR SURGERY  08/28/2019    [facility]. Left upper extremity fistulogram including venography to the superior vena cava. Balloon angioplasty left subclavian vein with 10mm x 40mm conquest balloon. Balloon angioplasty mid/proximal upper arm cephalic vein with 11LM x 40mm conquest balloon. Venograms after balloon angioplasty. Stent left mid/proximal upper arm cephalic vein stenosis fluency 10mm x 60mm self-expanding covered stent. Balloon     VASCULAR SURGERY  08/28/2019    cont angioplasty stent with 10mm x 40mm conquest balloon. Completion venograms left upper extremity. Medications Prior to Admission     Prior to Admission medications    Medication Sig Start Date End Date Taking?  Authorizing Provider   insulin glargine (LANTUS SOLOSTAR) 100 UNIT/ML injection pen Inject 40 Units into the skin daily 2/25/21  Yes Ekta Do, DO   hydrALAZINE (APRESOLINE) 50 MG tablet Take 1.5 tablets by mouth every 8 hours 10/26/20  Yes Lorrie Zabala MD   hydroCHLOROthiazide (HYDRODIURIL) 25 MG tablet Take 2 tablets by mouth daily 10/26/20  Yes Lorrie Zabala MD   Ergocalciferol (VITAMIN D2) 10 MCG (400 UNIT) TABS Take 1.25 mg by mouth every 14 days   Yes Historical Provider, MD   senna (SENNA-LAX) 8.6 MG tablet Take 1 tablet by mouth daily   Yes Historical Provider, MD   linaclotide (LINZESS) 290 MCG CAPS capsule Take 1 capsule by mouth every morning (before breakfast) 8/18/20  Yes ROMINA Diallo   cloNIDine (CATAPRES) 0.3 MG tablet Take 1 tablet by mouth every 8 hours 10/13/19  Yes Eliseo Gonzalez MD   sevelamer (RENVELA) 800 MG tablet Take 1 tablet by mouth 3 times daily (with meals)   Yes Historical Provider, MD   lisinopril (PRINIVIL;ZESTRIL) 20 MG tablet Take 1 tablet by mouth 2 times daily 19  Yes Ramírez Horse, DO   metoprolol succinate (TOPROL XL) 100 MG extended release tablet Take 1 tablet by mouth every morning (before breakfast) 19  Yes Ramírez Horse, DO   albuterol sulfate  (90 Base) MCG/ACT inhaler Inhale 2 puffs into the lungs every 4 hours as needed   Yes Historical Provider, MD   levothyroxine (SYNTHROID) 150 MCG tablet Take 150 mcg by mouth Daily   Yes Historical Provider, MD   NIFEdipine (ADALAT CC) 60 MG extended release tablet Take 60 mg by mouth daily   Yes Historical Provider, MD   isosorbide mononitrate (IMDUR) 120 MG extended release tablet Take 1 tablet by mouth daily 17  Yes Wayne Aquino,    DULoxetine (CYMBALTA) 60 MG extended release capsule Take 60 mg by mouth daily   Yes Historical Provider, MD   rOPINIRole (REQUIP) 2 MG tablet Take 4 mg by mouth nightly Indications: Restless Leg Syndrome    Yes Historical Provider, MD   simvastatin (ZOCOR) 40 MG tablet Take 40 mg by mouth nightly    Yes Historical Provider, MD   insulin aspart (NOVOLOG FLEXPEN) 100 UNIT/ML injection pen Inject 5 Units into the skin 3 times daily    Yes Historical Provider, MD        Allergies   Penicillins, Lamisil advanced [terbinafine], Stadol [butorphanol], and Reglan [metoclopramide]    Social History     Social History     Tobacco History     Smoking Status  Current Every Day Smoker Smoking Frequency  0.5 packs/day for 33 years (16.5 pk yrs) Smoking Tobacco Type  Cigarettes    Smokeless Tobacco Use  Never Used    Tobacco Comment  NOW at 1 PD, started at age 24 and has averaged 2 PPD          Alcohol History     Alcohol Use Status  No          Drug Use     Drug Use Status  Not Currently Types  Marijuana          Sexual Activity     Sexually Active  Not Currently Partners  Male                Family History     Family History   Problem Relation Age of Onset    Colon Cancer Mother          at 61    Colon Polyps Mother    Ev Motta Father          at 79    Colon Polyps Father     Stomach Cancer Sister     Breast Cancer Sister 27    Depression Daughter 25        committed suicide    Liver Cancer Neg Hx     Liver Disease Neg Hx     Esophageal Cancer Neg Hx     Rectal Cancer Neg Hx        Review of Systems   Review of Systems: 16 point system reviewed and negative except listed above. Physical Exam   BP (!) 163/83   Pulse 90   Temp 97.4 °F (36.3 °C)   Resp 14   Ht 5' 6\" (1.676 m)   Wt 140 lb (63.5 kg)   SpO2 99%   BMI 22.60 kg/m²       Physical Exam  General: Alert, well-developed, no acute distress lying comfortably in bed. HEENT: Atraumatic normocephalic, range of motion normal, no JVD, no tracheal deviation noted, head bobbing  Cardiac: Normal C8-L9 with mild systolic murmur  Respiratory: Effort normal, breath sounds normal, clear To auscultation bilaterally, no rhonchi or rales, no wheezing  Abdomen: Soft, positive bowel sounds in all quadrants, no distention, nontender to palpation, no organomegaly noted, no rebound noted, no CVA tenderness. MSK/extremities: no tenderness, no edema, no deformity, moves all extremities  Skin: Warm, no erythema noted, no bruising and no lesions noted. Psych: Affect normal and good eye contact, behavioral normal, thought content normal.  Neurological: No focal deficits, alert and conversant, no formal disorientation noted. no abnormal coordination.         Labs      Recent Results (from the past 24 hour(s))   CBC Auto Differential    Collection Time: 21  6:46 AM   Result Value Ref Range    WBC 6.1 4.8 - 10.8 K/uL    RBC 3.46 (L) 4.20 - 5.40 M/uL    Hemoglobin 10.2 (L) 12.0 - 16.0 g/dL    Hematocrit 33.3 (L) 37.0 - 47.0 %    MCV 96.2 81.0 - 99.0 fL    MCH 29.5 27.0 - 31.0 pg    MCHC 30.6 (L) 33.0 - 37.0 g/dL    RDW 18.9 (H) 11.5 - 14.5 %    Platelets 176 668 - 210 K/uL    MPV 9.6 9.4 - 12.3 fL    Neutrophils % 77.7 (H) 50.0 - 65.0 %    Lymphocytes % 11.3 (L) 20.0 - 40.0 %    Monocytes % 7.7 0.0 - 10.0 %    Eosinophils % 1.5 0.0 - 5.0 %    Basophils % 1.3 (H) 0.0 - 1.0 %    Neutrophils Absolute 4.8 1.5 - 7.5 K/uL    Immature Granulocytes # 0.0 K/uL    Lymphocytes Absolute 0.7 (L) 1.1 - 4.5 K/uL    Monocytes Absolute 0.50 0.00 - 0.90 K/uL    Eosinophils Absolute 0.10 0.00 - 0.60 K/uL    Basophils Absolute 0.10 0.00 - 0.20 K/uL   Comprehensive Metabolic Panel w/ Reflex to MG    Collection Time: 04/29/21  6:46 AM   Result Value Ref Range    Sodium 123 (L) 136 - 145 mmol/L    Potassium reflex Magnesium 4.5 3.5 - 5.0 mmol/L    Chloride 81 (L) 98 - 111 mmol/L    CO2 24 22 - 29 mmol/L    Anion Gap 18 7 - 19 mmol/L    Glucose 695 (HH) 74 - 109 mg/dL    BUN 60 (H) 6 - 20 mg/dL    CREATININE 6.3 (H) 0.5 - 0.9 mg/dL    GFR Non-African American 7 (A) >60    GFR  8 (L) >59    Calcium 10.0 8.6 - 10.0 mg/dL    Total Protein 7.4 6.6 - 8.7 g/dL    Albumin 3.6 3.5 - 5.2 g/dL    Total Bilirubin 0.4 0.2 - 1.2 mg/dL    Alkaline Phosphatase 101 35 - 104 U/L    ALT 9 5 - 33 U/L    AST 11 5 - 32 U/L   Troponin    Collection Time: 04/29/21  6:46 AM   Result Value Ref Range    Troponin 0.22 (HH) 0.00 - 0.03 ng/mL   Troponin    Collection Time: 04/29/21  9:05 AM   Result Value Ref Range    Troponin 0.20 (HH) 0.00 - 0.03 ng/mL   POCT Glucose    Collection Time: 04/29/21  9:55 AM   Result Value Ref Range    POC Glucose 528 (H) 70 - 99 mg/dl    Performed on [employer]    POCT Glucose    Collection Time: 04/29/21 11:33 AM   Result Value Ref Range    POC Glucose 352 (H) 70 - 99 mg/dl    Performed on [employer]    COVID-19, Rapid    Collection Time: 04/29/21 12:07 PM    Specimen: Nasopharyngeal Swab   Result Value Ref Range    SARS-CoV-2, NAAT Not Detected Not Detected        Imaging/Diagnostics Last 24 Hours   Xr Knee Left (1-2 Views)    Result Date: 4/29/2021  Examination. XR KNEE LEFT (1-2 VIEWS) 4/29/2021 6:14 AM History: The patient fell and complains of pain in the left knee. Frontal and lateral views of the left knee are compared with the previous study dated 2/24/2021. There is no evidence of recent fracture or dislocation. There is moderate diffuse osteopenia. No focal bony erosion. No soft tissue swelling or a mass. There is moderate diffuse narrowing of the tibiofemoral joint space similar to the previous study. No significant osteophytes. No joint effusion. The severe atheromatous changes of the popliteal and tibial arteries are seen. No acute bony abnormality. A moderate diffuse osteopenia. A mild to moderate chronic osteoarthritis. Signed by Dr Pablo Cha on 4/29/2021 7:37 AM    Ct Chest W Contrast    Result Date: 4/29/2021  Examination. CT CHEST W CONTRAST 4/29/2021 6:42 AM History: The patient fell and complains of left-sided chest pain. DLP: 205 mGycm. The CT scan of the chest is performed after intravenous contrast enhancement. The images are acquired in axial plane and subsequent reconstruction in coronal and sagittal planes. There is no previous similar study for comparison. The correlation is made with chest radiograph dated 10/24/2020. The lungs are moderately well-expanded. There is an ill-defined groundglass infiltrate in the left lower lobe. There are atelectatic changes in the lingular segment of the left upper lobe. There is an area of pleural thickening involving the right lower lung laterally, in the lateral part of the inferolateral right major fissure which appears to be present a chronic process/inflammation/scarring. There is another ill-defined density in the right middle lobe posteriorly, adjacent and hit into the right major fissure which also represent a of scarring. No discrete solid complements/nodule. No areas of focal consolidation or infiltrate. No pneumothorax. The limited included soft tissues of the neck. Unremarkable.  There is enlargement of the cardiac lobe of the thyroid gland which is extending posteriorly adjacent and to the left of the esophagus. There is an ill-defined nodule in this part of the thyroid gland. There is no axillary lymphadenopathy. Atheromatous changes of the thoracic aorta are seen. No aneurysmal dilatation. A moderate diffuse dilatation of the main pulmonary artery and right and left pulmonary arteries. No filling defect in the opacified pulmonary arterial bed. Atheromatous changes of coronary arteries are seen. There are a few borderline prominent mediastinal lymph nodes. A precarinal lymph node measures 1.1 cm in short axis. A level 4L lymph node measures 1.2 cm in short axis. No abnormal hilar lymph nodes are seen. The limited visualized liver and spleen are unremarkable. The gallbladder is surgically absent. The adrenal glands are unremarkable. The pancreas and kidneys are incompletely visualized and not evaluated. The images reviewed in bone window show deformity of the left second rib anteriorly which is represent an old healed fracture. No acute fracture or acute bony abnormality seen. A prominent Schmorl node is seen in the superior endplate of vertebra L2. An ill-defined groundglass infiltrate in the left lower lung may represent an evolving pneumonia/inflammatory process. Further follow-up is suggested. The scattered areas of pleural thickening and scarring representing a chronic process. Pulmonary arterial hypertension. Right thyroid nodule may be further correlated with sonography. No acute bony abnormality. Signed by Dr Juvenal Keating on 4/29/2021 8:00 AM    Ct Abdomen Pelvis W Iv Contrast Additional Contrast? None    Result Date: 4/29/2021  CT ABDOMEN PELVIS W IV CONTRAST 4/29/2021 6:27 AM HISTORY: Left rib pain after fall COMPARISON: None. DLP: 205 mGy cm. Automated exposure control was utilized to diminish patient radiation dose. TECHNIQUE: Following the intravenous administration of contrast, helical CT tomographic images of the abdomen and pelvis were acquired.  Coronal reformatted images were also provided for review. FINDINGS: Mild left basilar subsegmental atelectasis is present. Nondisplaced fractures are noted in the left anterolateral sixth, seventh and eighth ribs. No evidence of basilar contusion or pneumothorax. The base of the heart is unremarkable. Harriet Young LIVER: No focal liver lesion. The hepatic vasculature is patent. BILIARY SYSTEM: The gallbladder has been surgically removed. No evidence of intra or extrahepatic biliary dilatation. Harriet Young PANCREAS: No focal pancreatic lesion. SPLEEN: Splenic granulomas are present. There is no evidence of splenomegaly. Harriet Young KIDNEYS AND ADRENALS: The adrenals are unremarkable. Both kidneys are atrophic in appearance. There is some mild heterogeneous enhancement of the upper pole of the left kidney does raise a differential of pyelonephritis. No perinephric fluid collections are present. There is no evidence of nephrolithiasis. . The ureters are difficult to trace secondary to the lack of intra-abdominal fat but I do not see evidence of obstructive uropathy or discrete ureteral stone. Harriet Young RETROPERITONEUM: No mass, lymphadenopathy or hemorrhage. GI TRACT: No evidence of obstruction or bowel wall thickening. The appendix is not well-visualized. . OTHER: There is no mesenteric mass, lymphadenopathy or fluid collection. The abdominopelvic vasculature is patent. The osseous structures and soft tissues demonstrate no worrisome lesions. No evidence of free fluid or hemoperitoneum. There is some mild stranding within the subcutaneous tissues rather diffusely suggesting an element of anasarca/volume overload. PELVIS: No mass lesion, fluid collection or significant lymphadenopathy is seen in the pelvis. The urinary bladder is normal in appearance. 1. Nondisplaced fractures are noted of the left anterolateral sixth, seventh and eighth ribs. Mild left basilar atelectasis is present. 2. Both kidneys are atrophic in appearance.  There is some heterogeneous enhancement involving the mid and upper pole of the left kidney perhaps representing pyelonephritis. No perinephric fluid collections are present. No evidence of obstructive uropathy. 3. No evidence of soft tissue organ injury or hemoperitoneum. There is a normal bowel gas pattern. 4. Mild stranding noted rather diffusely of the subcutaneous fat within both the abdomen and pelvis and lower chest. This would suggest an element of anasarca/body wall edema. . Signed by Dr Ness Lim on 4/29/2021 8:22 AM    Xr Hip 2-3 Vw W Pelvis Left    Result Date: 4/29/2021  History: Fall with pain Left hip: Frontal view the pelvis is obtained with frontal and frog-leg lateral views left hip. COMPARISON: 3/12/2021 FINDINGS: There is osteopenia. No fracture or dislocation identified. Hip joint spaces are preserved. Diffuse vascular calcifications. Small pelvic phleboliths. 1. Osteopenia with no acute osseous pathology identified. Signed by Dr Yvonne Berman on 4/29/2021 7:34 AM      Assessment      Hospital Problems           Last Modified POA    Hypertensive urgency 4/29/2021 Yes          Plan       Hypertensive urgency  Continue nitro drip  Reinitiate patient's chronic antihypertensive medications. End-stage renal disease on dialysis  Nephrology consultation for in-house dialysis sessions. Continue Renvela. Constipation  Initiate lactulose. Type 2 diabetes  Lantus 20 units twice daily  Lispro 7 units mealtime coverage and bedtime. Insulin sliding scale  Hypoglycemia protocol. Elevated troponins in setting of hypertensive urgency and end-stage renal disease. No concerns of chest pain at this time. Continue to monitor if develops symptoms. Nondisplaced left rib 6th, 7th, 8th fracture. Norco 5/325 mg q. 6 as needed    Hypothyroidism  Continue Synthroid  Obtain TSH levels. Hyperlipidemia: Continue statin. Code: Full  DVT prophylaxis: Heparin  Diet: Renal/diabetic  Disposition: Pending improvement in above clinical status.           Consultations Ordered:  IP CONSULT TO NEPHROLOGY    Electronically signed by Phillip Griffiths MD on 4/29/21 at 3:13 PM CDT

## 2021-04-29 NOTE — CONSULTS
Renal Consult Note    Thank you to requesting provider: Dr Ceasar Cheng, for asking us to see Shivani Al    Reason for consultation:  ESRD    Chief Complaint:  Nausea, dyspepsia    History of Presenting Illness      Patient is a 44 yo pleasant woman who has HTN, Dm2, and ESRD. She is on chronic maintenance HD TIW. She recently sustained a fall with rib fractures. She was taking pain meds. She was having leg numbness. She denied head trauma. Since 4/28, she has been having upset stomach with nausea and vomiting. Today, she was sent to ED form her dialysis unit when she looked bad to them. Renal service was consulted to manage her ESRD. She has no fever, no dysuria. Patient has not received any dialysis today. She was severely hypertensive on admission (/85).      Past Medical/Surgical History      Active Ambulatory Problems     Diagnosis Date Noted    Gastroesophageal reflux disease without esophagitis     Acquired hypothyroidism     Anemia in chronic kidney disease (CKD) 05/23/2017    Iron deficiency 05/23/2017    Family history of colon cancer 10/20/2017    Unintentional weight loss 10/20/2017    Type 2 diabetes mellitus with chronic kidney disease on chronic dialysis, with long-term current use of insulin ([address] 12/09/2017    Noncompliance of patient with renal dialysis ([address]     Respiratory failure ([address] 03/05/2018    Acute respiratory failure with hypoxia and hypercapnia ([address] 03/07/2018    Dialysis AV fistula malfunction (HCC) 06/27/2018    Gastroparesis 08/08/2018    Nausea and vomiting 08/08/2018    Chronic constipation 08/08/2018    Uncontrolled type 2 diabetes mellitus with complication, with long-term current use of insulin ([address] 08/08/2018    Abnormal CT of liver 08/15/2018    Other ascites 08/15/2018    ESRD (end stage renal disease) on dialysis ([address]     High hepatic iron concentration determined by biopsy of liver 12/05/2018    Steal syndrome as complication of dialysis access ([address] 12/19/2018    PVD (peripheral vascular disease) ([address]     ESRD on hemodialysis ([address] 01/25/2019    Hyponatremia 01/26/2019    Hyperglycemia due to type 2 diabetes mellitus ([address] 01/26/2019    Normocytic anemia 01/26/2019    Volume overload 03/27/2019    Left homonymous hemianopsia     Acute intractable headache     Accelerated hypertension 04/20/2019    Medically noncompliant 04/20/2019    Noncompliance with medications     Seizure ([address] 10/08/2019    Hyperglycemic hyperosmolar nonketotic coma ([address]     Type 2 diabetes mellitus with hyperosmolar coma, with long-term current use of insulin ([address]     Palliative care patient 10/08/2019    Hypertensive emergency 06/24/2020    Chronic epigastric pain 08/18/2020    Chronic nausea 08/18/2020    Chronic vomiting 08/18/2020    Abnormal CT of the abdomen 08/18/2020    Poor appetite 08/18/2020    Personal history of colonic polyps 08/18/2020    Hyperosmolar syndrome 09/16/2020    DKA, type 2, not at goal [facility]) 10/21/2020    Hypertensive urgency 10/21/2020    Altered mental state 10/21/2020    Sacroiliitis ([address] 01/13/2021    Lumbar radiculopathy 01/13/2021    Left leg pain 02/24/2021    Left leg swelling 02/24/2021    Unable to ambulate 02/24/2021    [facility]charge from [facility] 04/14/2021     Resolved Ambulatory Problems     Diagnosis Date Noted    Generalized abdominal pain 11/04/2016    Non-intractable vomiting with nausea 11/04/2016    Chronic constipation 11/04/2016    Dysphagia 11/04/2016    Heartburn 11/04/2016    Gastroparesis 03/25/2020    Hypertensive emergency     Hypoglycemia 05/22/2017    Hyperkalemia 05/23/2017    Hyperosmolarity due to secondary diabetes ([address] 12/09/2017    Hypertensive urgency, malignant     Acute encephalopathy     ESRD on hemodialysis ([address]     Hyponatremia 03/08/2018    Diabetic ketoacidosis without coma associated with type 2 diabetes mellitus ([address] 04/20/2019    Delirium 04/20/2019    Rfl:     senna (SENNA-LAX) 8.6 MG tablet, Take 1 tablet by mouth daily, Disp: , Rfl:     linaclotide (LINZESS) 290 MCG CAPS capsule, Take 1 capsule by mouth every morning (before breakfast), Disp: 30 capsule, Rfl: 11    cloNIDine (CATAPRES) 0.3 MG tablet, Take 1 tablet by mouth every 8 hours, Disp: 60 tablet, Rfl: 3    sevelamer (RENVELA) 800 MG tablet, Take 1 tablet by mouth 3 times daily (with meals), Disp: , Rfl:     lisinopril (PRINIVIL;ZESTRIL) 20 MG tablet, Take 1 tablet by mouth 2 times daily, Disp: 30 tablet, Rfl: 0    metoprolol succinate (TOPROL XL) 100 MG extended release tablet, Take 1 tablet by mouth every morning (before breakfast), Disp: 30 tablet, Rfl: 0    albuterol sulfate  (90 Base) MCG/ACT inhaler, Inhale 2 puffs into the lungs every 4 hours as needed, Disp: , Rfl:     levothyroxine (SYNTHROID) 150 MCG tablet, Take 150 mcg by mouth Daily, Disp: , Rfl:     NIFEdipine (ADALAT CC) 60 MG extended release tablet, Take 60 mg by mouth daily, Disp: , Rfl:     isosorbide mononitrate (IMDUR) 120 MG extended release tablet, Take 1 tablet by mouth daily, Disp: 30 tablet, Rfl: 0    DULoxetine (CYMBALTA) 60 MG extended release capsule, Take 60 mg by mouth daily, Disp: , Rfl:     rOPINIRole (REQUIP) 2 MG tablet, Take 4 mg by mouth nightly Indications: Restless Leg Syndrome , Disp: , Rfl:     simvastatin (ZOCOR) 40 MG tablet, Take 40 mg by mouth nightly , Disp: , Rfl:     insulin aspart (NOVOLOG FLEXPEN) 100 UNIT/ML injection pen, Inject 5 Units into the skin 3 times daily , Disp: , Rfl:   Outpatient Medications Marked as Taking for the 4/29/21 encounter [facility] Encounter)   Medication Sig Dispense Refill    insulin glargine (LANTUS SOLOSTAR) 100 UNIT/ML injection pen Inject 40 Units into the skin daily 5 pen 3    hydrALAZINE (APRESOLINE) 50 MG tablet Take 1.5 tablets by mouth every 8 hours 90 tablet 0    hydroCHLOROthiazide (HYDRODIURIL) 25 MG tablet Take 2 tablets by mouth daily 30 tablet 3    Ergocalciferol (VITAMIN D2) 10 MCG (400 UNIT) TABS Take 1.25 mg by mouth every 14 days      senna (SENNA-LAX) 8.6 MG tablet Take 1 tablet by mouth daily      linaclotide (LINZESS) 290 MCG CAPS capsule Take 1 capsule by mouth every morning (before breakfast) 30 capsule 11    cloNIDine (CATAPRES) 0.3 MG tablet Take 1 tablet by mouth every 8 hours 60 tablet 3    sevelamer (RENVELA) 800 MG tablet Take 1 tablet by mouth 3 times daily (with meals)      lisinopril (PRINIVIL;ZESTRIL) 20 MG tablet Take 1 tablet by mouth 2 times daily 30 tablet 0    metoprolol succinate (TOPROL XL) 100 MG extended release tablet Take 1 tablet by mouth every morning (before breakfast) 30 tablet 0    albuterol sulfate  (90 Base) MCG/ACT inhaler Inhale 2 puffs into the lungs every 4 hours as needed      levothyroxine (SYNTHROID) 150 MCG tablet Take 150 mcg by mouth Daily      NIFEdipine (ADALAT CC) 60 MG extended release tablet Take 60 mg by mouth daily      isosorbide mononitrate (IMDUR) 120 MG extended release tablet Take 1 tablet by mouth daily 30 tablet 0    DULoxetine (CYMBALTA) 60 MG extended release capsule Take 60 mg by mouth daily      rOPINIRole (REQUIP) 2 MG tablet Take 4 mg by mouth nightly Indications: Restless Leg Syndrome       simvastatin (ZOCOR) 40 MG tablet Take 40 mg by mouth nightly       insulin aspart (NOVOLOG FLEXPEN) 100 UNIT/ML injection pen Inject 5 Units into the skin 3 times daily          Allergies   Penicillins, Lamisil advanced [terbinafine], Stadol [butorphanol], and Reglan [metoclopramide]    Family History       Family History   Problem Relation Age of Onset    Colon Cancer Mother          at 61    Colon Polyps Mother     Lung Cancer Father          at 66    Colon Polyps Father     Stomach Cancer Sister     Breast Cancer Sister 27    Depression Daughter 25        committed suicide    Liver Cancer Neg Hx     Liver Disease Neg Hx     Esophageal Cancer Neg Hx     Rectal Cancer Neg Hx      Family history negative for kidney disease. Social History      Social History     Socioeconomic History    Marital status: Single     Spouse name: None    Number of children: 2    Years of education: None    Highest education level: None   Occupational History    None   Social Needs    Financial resource strain: None    Food insecurity     Worry: None     Inability: None    Transportation needs     Medical: None     Non-medical: None   Tobacco Use    Smoking status: Current Every Day Smoker     Packs/day: 0.50     Years: 33.00     Pack years: 16.50     Types: Cigarettes    Smokeless tobacco: Never Used    Tobacco comment: NOW at 1/4 PD, started at age 24 and has averaged 2 PPD   Substance and Sexual Activity    Alcohol use: No    Drug use: Not Currently     Types: Marijuana    Sexual activity: Not Currently     Partners: Male   Lifestyle    Physical activity     Days per week: None     Minutes per session: None    Stress: None   Relationships    Social connections     Talks on phone: None     Gets together: None     Attends Anabaptism service: None     Active member of club or organization: None     Attends meetings of clubs or organizations: None     Relationship status: None    Intimate partner violence     Fear of current or ex partner: None     Emotionally abused: None     Physically abused: None     Forced sexual activity: None   Other Topics Concern    None   Social History Narrative    None       Physical Exam     Blood pressure (!) 163/83, pulse 90, temperature 97.4 °F (36.3 °C), resp. rate 14, height 5' 6\" (1.676 m), weight 140 lb (63.5 kg), SpO2 99 %.     General:  NAD, A+Ox3, ill-appearing, normal body habitus  HEENT:  PERRL, EOMI  Neck:  Supple, normal range of movement  Chest: Bilateral air entry, scattered bilateral rhonchi  CV:  RRR, soft systolic murmur  Abdomen:  NTND, soft, +BS, no hepatosplenomegaly  Extremities:  No peripheral edema  Neurological: Moving all four extremities  Lymphatics:  No palpable lymph nodes   Skin:  No rash, no jaundice  Psychiatric:  Normal insight and judgement, good recall    Data     Recent Labs     04/29/21  0646   WBC 6.1   HGB 10.2*   HCT 33.3*   MCV 96.2        Recent Labs     04/29/21  0646   *   K 4.5   CL 81*   CO2 24   GLUCOSE 695*   BUN 60*   CREATININE 6.3*   LABGLOM 7*   GFRAA 8*       Assessment:  1. ESRD  2. Type 2 DM with renal disease  3. Hypertension  4. Abdominal pain  5. [place] /vomiting   6. S/p recent fall  7. COPD  8. Hyonatremia      Plan:  · Will provide dialysis and attempt removal of 4 Liters. Follow up l0abs. Provide anti-emetics. Continue BP meds.      Thank you for asking us to participate in the management of your patient, please do not hesitate to contact me for any concerns regarding my recommendations as outlined above.    -----------------------------  Electronically signed by Jagdish Claire MD on 4/29/21 at 4:40 PM CDT

## 2021-04-29 NOTE — ED PROVIDER NOTES
[facility] [place] EMERGENCY DEPT  eMERGENCY dEPARTMENT eNCOUnter      Pt Name: Jerica Gonsalves  MRN: [id number]  [place] 1970  Date of evaluation: 4/29/2021  Provider: Damien Brady MD    [address] [place] [address]       Chief Complaint   Patient presents with    Nausea & Vomiting     since yesterday    Constipation     on narcotic pain medication and stool softeners    Rib Pain     R sided, since fall         HISTORY OF PRESENT ILLNESS   (Location/Symptom, Timing/Onset,Context/Setting, Quality, Duration, Modifying Factors, Severity)  Note limiting factors. Jerica Gonsalves is a 45 y.o. female who presents to the emergency department with nausea vomiting and constipation. Patient states her last bowel movement was yesterday but it was just a small amount. She has been taking a full Percocet for the last couple of days due to rib pain. She had a fall 4 days ago. She reports that her feet are numb bilaterally and she trips often. She said she kind of rolled onto her left knee and hip and ribs. She did not hit her head or  lose consciousness. Reports no new neck or back pain. She reports that her feet have been numb for approximately a month or so. She has been evaluated and had an MRI of her lumbar spine per her report. She has follow-up with orthopedics. The patient has been having some nausea and vomiting since yesterday. She has had no fever. She was sent from dialysis today due to the vomiting. Her blood sugar was reading high with EMS. Her blood pressure was high as well. She has no headache    HPI    NursingNotes were reviewed. REVIEW OF SYSTEMS    (2-9 systems for level 4, 10 or more for level 5)     Review of Systems   Constitutional: Positive for activity change and appetite change. Negative for chills and fever. HENT: Negative for rhinorrhea and sore throat. Respiratory: Positive for shortness of breath. SOA due to pain with inspiration   Cardiovascular: Positive for chest pain.  Negative for leg swelling. Chest wall pain   Gastrointestinal: Positive for abdominal pain, nausea and vomiting. Negative for diarrhea. Genitourinary: Negative for dysuria. Musculoskeletal: Positive for gait problem and joint swelling. Negative for back pain and neck pain. Skin: Negative for rash. Neurological: Positive for weakness and numbness. Negative for headaches. Psychiatric/Behavioral: Negative for confusion. All other systems reviewed and are negative. A complete review of systems was performed and is negative except as noted above in the HPI.        PAST MEDICAL HISTORY     Past Medical History:   Diagnosis Date    Arthritis     Chronic kidney disease, stage 5, kidney failure ([address]     Closed fracture of right shoulder girdle, with routine healing, subsequent encounter     DM (diabetes mellitus) type II controlled with renal manifestation ([facility] 06/1993    Gastroparesis     GERD (gastroesophageal reflux disease)     Hemodialysis patient ([facility]     dialysis on tues, thur, and sat at [facility]    Hypertension     Hypothyroid     Nasal congestion     recent    Noncompliance with medications     Palliative care patient 10/08/2019    Restless leg syndrome          SURGICAL HISTORY       Past Surgical History:   Procedure Laterality Date    BREAST SURGERY Left 2008    infected milk gland removed    CHOLECYSTECTOMY, LAPAROSCOPIC      2008    COLONOSCOPY Left 9/17/2020    Dr Andrew Og hepatic flexure-Severe tortuosity with severe spasming, 1 yr recall    DIALYSIS FISTULA CREATION Left 1/25/2019    REVISION LEFT UPPER EXTREMITY AV ACCESS WITH LEFT BRACHIAL ARTERY TO LEFT AXILLARY VEIN WITH  INTERPOSITIONAL ARTEGRAFT   performed by Haim Watson MD at [address]  2012    [address] Right 1/25/2019    INSERTION OF RIGHT INTERNAL JUGULAR HEMODIALYSIS CATHETER performed by Haim Watson MD at [facility]  08/17/2018    1.  Moderately increased stainable iron identified by special stain in Kupffer cells and occasional hepatocytes. Negative for evidence of significant portal or lobular inflammation. Negative for evidence of significant fibrosis    IN COLONOSCOPY W/BIOPSY SINGLE/MULTIPLE N/A 10/20/2017    Dr Efrain Patel prep-Tubular AP (-) dysplasia x 2--3 yr recall    IN EGD TRANSORAL BIOPSY SINGLE/MULTIPLE N/A 12/27/2016    Dr Alvarez Pry EGD TRANSORAL BIOPSY SINGLE/MULTIPLE N/A 10/20/2017    Dr Flory Covarrubias (-)   [address] VASCULAR SURGERY Left 2016    fistula by Dr Baez Winona at [address]  10/02/2017    [facility]. Left upper fistulograms/venograms, balloon angioplasty cephalic vein arch [id number] conquest.    VASCULAR SURGERY  08/2018    FISTULOGRAM    VASCULAR SURGERY  10/29/2018    [facility]. Left upper fistulograms/venograms    VASCULAR SURGERY  12/19/2018    [facility]. Arch aortogram,left upper arteriograms.  VASCULAR SURGERY  03/06/2019    [facility]. Removal of tunneled dialyis catheter right internal jugular vein.  VASCULAR SURGERY  08/28/2019    [facility]. Left upper extremity fistulogram including venography to the superior vena cava. Balloon angioplasty left subclavian vein with 10mm x 40mm conquest balloon. Balloon angioplasty mid/proximal upper arm cephalic vein with 57FS x 40mm conquest balloon. Venograms after balloon angioplasty. Stent left mid/proximal upper arm cephalic vein stenosis fluency 10mm x 60mm self-expanding covered stent. Balloon     VASCULAR SURGERY  08/28/2019    cont angioplasty stent with 10mm x 40mm conquest balloon. Completion venograms left upper extremity.          CURRENT MEDICATIONS       Previous Medications    ALBUTEROL SULFATE  (90 BASE) MCG/ACT INHALER    Inhale 2 puffs into the lungs every 4 hours as needed    CLONIDINE (CATAPRES) 0.3 MG TABLET    Take 1 tablet by mouth every 8 hours    DULOXETINE (CYMBALTA) 60 MG EXTENDED RELEASE CAPSULE    Take 60 mg by mouth daily    ERGOCALCIFEROL (VITAMIN D2) 10 MCG (400 UNIT) TABS    Take 1.25 mg by mouth every 14 days    HYDRALAZINE (APRESOLINE) 50 MG TABLET    Take 1.5 tablets by mouth every 8 hours    HYDROCHLOROTHIAZIDE (HYDRODIURIL) 25 MG TABLET    Take 2 tablets by mouth daily    INSULIN ASPART (NOVOLOG FLEXPEN) 100 UNIT/ML INJECTION PEN    Inject 5 Units into the skin 3 times daily     INSULIN GLARGINE (LANTUS SOLOSTAR) 100 UNIT/ML INJECTION PEN    Inject 40 Units into the skin daily    ISOSORBIDE MONONITRATE (IMDUR) 120 MG EXTENDED RELEASE TABLET    Take 1 tablet by mouth daily    LEVOTHYROXINE (SYNTHROID) 150 MCG TABLET    Take 150 mcg by mouth Daily    LINACLOTIDE (LINZESS) 290 MCG CAPS CAPSULE    Take 1 capsule by mouth every morning (before breakfast)    LISINOPRIL (PRINIVIL;ZESTRIL) 20 MG TABLET    Take 1 tablet by mouth 2 times daily    METOPROLOL SUCCINATE (TOPROL XL) 100 MG EXTENDED RELEASE TABLET    Take 1 tablet by mouth every morning (before breakfast)    NIFEDIPINE (ADALAT CC) 60 MG EXTENDED RELEASE TABLET    Take 60 mg by mouth daily    ROPINIROLE (REQUIP) 2 MG TABLET    Take 4 mg by mouth nightly Indications: Restless Leg Syndrome     SENNA (SENNA-LAX) 8.6 MG TABLET    Take 1 tablet by mouth daily    SEVELAMER (RENVELA) 800 MG TABLET    Take 1 tablet by mouth 3 times daily (with meals)    SIMVASTATIN (ZOCOR) 40 MG TABLET    Take 40 mg by mouth nightly        ALLERGIES     Penicillins, Lamisil advanced [terbinafine], Stadol [butorphanol], and Reglan [metoclopramide]    FAMILY HISTORY       Family History   Problem Relation Age of Onset    Colon Cancer Mother          at 61    Colon Polyps Mother     Lung Cancer Father          at 66    Colon Polyps Father     Stomach Cancer Sister     Breast Cancer Sister 27    Depression Daughter 25        committed suicide    Liver Cancer Neg Hx     Liver Disease Neg Hx     Esophageal Cancer Neg Hx     Rectal Cancer Neg Hx           SOCIAL HISTORY       Social History to light. Neck:      Musculoskeletal: Normal range of motion and neck supple. Cardiovascular:      Rate and Rhythm: Normal rate and regular rhythm. Heart sounds: Normal heart sounds. Pulmonary:      Effort: Pulmonary effort is normal. No respiratory distress. Breath sounds: Normal breath sounds. Comments: Left chest wall ttp  Chest:      Chest wall: Tenderness present. Abdominal:      General: Bowel sounds are normal. There is no distension. Palpations: Abdomen is soft. Tenderness: There is no abdominal tenderness. Musculoskeletal: Normal range of motion. General: Tenderness and signs of injury present. Comments: ttp over left knee, large scab over left knee   Skin:     General: Skin is warm and dry. Findings: No rash. Neurological:      Mental Status: She is alert and oriented to person, place, and time. Cranial Nerves: No cranial nerve deficit. Motor: No abnormal muscle tone. Coordination: Coordination normal.   Psychiatric:         Behavior: Behavior normal.         DIAGNOSTIC RESULTS     EKG: All EKG's are interpreted by the Emergency Department Physician who either signs or Co-signs this chart in the absence of a cardiologist.  NSR rate 87 inferior and lateral st depression  nsr rate 99 inferolateral st depression    RADIOLOGY:   Non-plain film images such as CT, Ultrasound and MRI are read by the radiologist. [facility] images are visualized and preliminarily interpreted by the emergency physician with the below findings:        Interpretation per the Radiologist below, if available at the time of this note:    CT ABDOMEN PELVIS W IV CONTRAST Additional Contrast? None   Final Result   1. Nondisplaced fractures are noted of the left anterolateral sixth,   seventh and eighth ribs. Mild left basilar atelectasis is present. 2. Both kidneys are atrophic in appearance.  There is some   heterogeneous enhancement involving the mid and upper pole of the left   kidney perhaps representing pyelonephritis. No perinephric fluid   collections are present. No evidence of obstructive uropathy. 3. No evidence of soft tissue organ injury or hemoperitoneum. There is   a normal bowel gas pattern. 4. Mild stranding noted rather diffusely of the subcutaneous fat   within both the abdomen and pelvis and lower chest. This would suggest   an element of anasarca/body wall edema. .    Signed by Dr Sabine Damian on 4/29/2021 8:22 AM      CT CHEST W CONTRAST   Final Result   An ill-defined groundglass infiltrate in the left lower   lung may represent an evolving pneumonia/inflammatory process. Further   follow-up is suggested. The scattered areas of pleural thickening and scarring representing a   chronic process. Pulmonary arterial hypertension. Right thyroid nodule may be further correlated with sonography. No acute bony abnormality. Signed by Dr Rosemary Gaxiola on 4/29/2021 8:00 AM      XR HIP 2-3 VW W PELVIS LEFT   Final Result   1. Osteopenia with no acute osseous pathology identified. Signed by Dr Niurka Sandoval on 4/29/2021 7:34 AM      XR KNEE LEFT (1-2 VIEWS)   Final Result   No acute bony abnormality. A moderate diffuse osteopenia. A mild to moderate chronic osteoarthritis.    Signed by Dr Rosemary Gaxiola on 4/29/2021 7:37 AM            ED BEDSIDE ULTRASOUND:   Performed by ED Physician - none    LABS:  Labs Reviewed   CBC WITH AUTO DIFFERENTIAL - Abnormal; Notable for the following components:       Result Value    RBC 3.46 (*)     Hemoglobin 10.2 (*)     Hematocrit 33.3 (*)     MCHC 30.6 (*)     RDW 18.9 (*)     Neutrophils % 77.7 (*)     Lymphocytes % 11.3 (*)     Basophils % 1.3 (*)     Lymphocytes Absolute 0.7 (*)     All other components within normal limits   COMPREHENSIVE METABOLIC PANEL W/ REFLEX TO MG FOR LOW K - Abnormal; Notable for the following components:    Sodium 123 (*)     Chloride 81 (*)     Glucose 695 (*)     BUN 60 (*) CREATININE 6.3 (*)     GFR Non- 7 (*)     GFR  8 (*)     All other components within normal limits    Narrative:     Waylon Klein tel. ,  Chemistry results called to and read back by kadeem haddad at [facility], 04/29/2021  07:36, by ANTONIO   TROPONIN - Abnormal; Notable for the following components:    Troponin 0.22 (*)     All other components within normal limits    Narrative:     Waylon Klein tel. ,  Chemistry results called to and read back by kadeem haddad at [facility], 04/29/2021  07:36, by ANTONIO   TROPONIN - Abnormal; Notable for the following components:    Troponin 0.20 (*)     All other components within normal limits    Narrative:     Waylon Klein tel. ,  Chemistry results called to and read back by Alphonso Pedro, 04/29/2021  09:42, by ANTONIO   POCT GLUCOSE - Abnormal; Notable for the following components:    POC Glucose 528 (*)     All other components within normal limits   URINE RT REFLEX TO CULTURE   POCT GLUCOSE   POCT GLUCOSE       All other labs were within normal range or not returned as of this dictation. EMERGENCY DEPARTMENT COURSE and DIFFERENTIALDIAGNOSIS/MDM:   Vitals:    Vitals:    04/29/21 0642 04/29/21 0715 04/29/21 0810 04/29/21 1035   BP:  (!) 205/85 (!) 215/87 (!) 202/94   Pulse:  85 90    Resp:  20 14    Temp:  97.4 °F (36.3 °C)     SpO2:  100% 99%    Weight: 140 lb (63.5 kg)      Height: 5' 6\" (1.676 m)          MDM     Corrected sodium 133    Cath: 6/2020:  Conclusions      Peripheral Procedure Description      Nonobstructive coronary artery disease. Normal LV systolic function. Subocclusive left renal artery distally with atretic right renal   vasculature and no dye retention in renal pelvises consistent with   nonfunctional kidneys. Recommendations      Medical management.       Signatures      ----------------------------------------------------------------   Electronically signed by Venkat Pedro MD(Performing Physician) on   06/26/2020 14:31 ----------------------------------------------------------------      Angiographic Findings      Cardiac Arteries and Lesion Findings     LMCA: Left main patent.     LAD: LAD proximal 40% stenosis, mid 50% stenosis. Lesion on Prox LAD: 40% stenosis 9 mm length. Lesion on Mid LAD: 50% stenosis 6 mm length. LCx: Circumflex small vessel with moderate luminal irregularities     RCA: RCA proximal 40% stenosis, ulcerated lesion. Lesion on Prox RCA: 40% stenosis 20 mm length. Ramus: Ramus is a large vessel with moderate severe global irregularities.       CONSULTS:  None  D/w dr Jeremy Issa. Trop likely dialysis, bp related. Can admit and trend. Has seen dr Colin Goldsmith in the past, can consult him on admissin    bp was down to 160s after nitro, but now back at 200. D/w dr guardado and wanted to go ahead and start nitro infusion. PROCEDURES:  Unless otherwise notedbelow, none     Procedures    FINAL IMPRESSION     1. Closed fracture of multiple ribs of left side, initial encounter    2. Elevated troponin    3. Hyperglycemia    4. Abnormal EKG    5.  End stage renal disease (HCC)          DISPOSITION/PLAN   DISPOSITION        PATIENT REFERRED TO:  @FUP@    DISCHARGE MEDICATIONS:  New Prescriptions    No medications on file          (Please note that portions of this note were completed with a voice recognition program.  Efforts were made to edit the dictations butoccasionally words are mis-transcribed.)    Esperanza Rodriguez MD (electronically signed)  AttendingEmergency Physician         Esperanza Rodriguez MD  04/29/21 1693

## 2021-04-29 NOTE — ED NOTES
Pt taken to dialysis report given to [facility] in dialysis     Agustin Jaramillo RN  04/29/21 9724

## 2021-04-29 NOTE — ED NOTES
Pt does not urinate frequently states maybe every 2 to 3 days if that     [facility]  04/29/21 5861

## 2021-04-29 NOTE — PROGRESS NOTES
Nephrology ([facility]) Progress Note    Patient:  Becki Anderson  YOB: 1970  Date of Service: 4/29/2021  MRN: [id number]   Acct: [de-identified]   Primary Care Physician: ROMINA Kc  Advance Directive: Prior  Admit Date: 4/29/2021       Hospital Day: 0  Referring Provider: Karen King MD    Patient Seen, Chart, Consults, Notes, Labs, Radiology studies reviewed. Subjective:     Patient is a 44 yo pleasant woman who has HTN, Dm2, and ESRD. She is on chronic maintenance HD TIW. She recently sustained a fall with rib fractures. She was taking pain meds. She was having leg numbness. She denied head trauma. Since 4/28, she has been having upset stomach with nausea and vomiting. Today, she was sent to ED form her dialysis unit when she looked bad to them. Renal service was consulted to manage her ESRD. She has no fever, no dysuria. Patient has not received any dialysis today. She was severely hypertensive on admission (/85).        Dialysis   Pt was seen on RRT  Modality: Hemodialysis  Access: Arterial Venous Fistula  Location: left upper  QB: 400  QD: 600  UF: 4 Liters    Allergies:  Penicillins, Lamisil advanced [terbinafine], Stadol [butorphanol], and Reglan [metoclopramide]    Medicines:  Current Facility-Administered Medications   Medication Dose Route Frequency Provider Last Rate Last Admin    nitroGLYCERIN (NITROSTAT) SL tablet 0.4 mg  0.4 mg Sublingual Q5 Min PRN Karen King MD   0.4 mg at 04/29/21 0811    nitroGLYCERIN 50 mg in dextrose 5% 250 mL infusion  5-200 mcg/min Intravenous Continuous Karen King MD   Stopped at 04/29/21 1516    nicotine (NICODERM CQ) 21 MG/24HR 1 patch  1 patch Transdermal Daily Kayleigh Washington MD         Current Outpatient Medications   Medication Sig Dispense Refill    insulin glargine (LANTUS SOLOSTAR) 100 UNIT/ML injection pen Inject 40 Units into the skin daily 5 pen 3    hydrALAZINE (APRESOLINE) 50 MG tablet Take 1.5 tablets by mouth every 8 hours 90 tablet 0    hydroCHLOROthiazide (HYDRODIURIL) 25 MG tablet Take 2 tablets by mouth daily 30 tablet 3    Ergocalciferol (VITAMIN D2) 10 MCG (400 UNIT) TABS Take 1.25 mg by mouth every 14 days      senna (SENNA-LAX) 8.6 MG tablet Take 1 tablet by mouth daily      linaclotide (LINZESS) 290 MCG CAPS capsule Take 1 capsule by mouth every morning (before breakfast) 30 capsule 11    cloNIDine (CATAPRES) 0.3 MG tablet Take 1 tablet by mouth every 8 hours 60 tablet 3    sevelamer (RENVELA) 800 MG tablet Take 1 tablet by mouth 3 times daily (with meals)      lisinopril (PRINIVIL;ZESTRIL) 20 MG tablet Take 1 tablet by mouth 2 times daily 30 tablet 0    metoprolol succinate (TOPROL XL) 100 MG extended release tablet Take 1 tablet by mouth every morning (before breakfast) 30 tablet 0    albuterol sulfate  (90 Base) MCG/ACT inhaler Inhale 2 puffs into the lungs every 4 hours as needed      levothyroxine (SYNTHROID) 150 MCG tablet Take 150 mcg by mouth Daily      NIFEdipine (ADALAT CC) 60 MG extended release tablet Take 60 mg by mouth daily      isosorbide mononitrate (IMDUR) 120 MG extended release tablet Take 1 tablet by mouth daily 30 tablet 0    DULoxetine (CYMBALTA) 60 MG extended release capsule Take 60 mg by mouth daily      rOPINIRole (REQUIP) 2 MG tablet Take 4 mg by mouth nightly Indications: Restless Leg Syndrome       simvastatin (ZOCOR) 40 MG tablet Take 40 mg by mouth nightly       insulin aspart (NOVOLOG FLEXPEN) 100 UNIT/ML injection pen Inject 5 Units into the skin 3 times daily          Past Medical History:  Past Medical History:   Diagnosis Date    Arthritis     Chronic kidney disease, stage 5, kidney failure (HCC)     Closed fracture of right shoulder girdle, with routine healing, subsequent encounter     DM ([facility]abetes mellitus) type II controlled [facility] 06[facility]    Gastroparesis     GERD (gastroesophageal reflux disease)     Hemodialysis patient [facility])     dialysis on tues, thur, and sat at [facility] Hypertension     Hypothyroid     Nasal congestion     recent    Noncompliance with medications     Palliative care patient 10/08/2019    Restless leg syndrome        Past Surgical History:  Past Surgical History:   Procedure Laterality Date    BREAST SURGERY Left 2008    infected milk gland removed    CHOLECYSTECTOMY, LAPAROSCOPIC      2008    COLONOSCOPY Left 9/17/2020    Dr Mery Almanza hepatic flexure-Severe tortuosity with severe spasming, 1 yr recall    DIALYSIS FISTULA CREATION Left 1/25/2019    REVISION LEFT UPPER EXTREMITY AV ACCESS WITH LEFT BRACHIAL ARTERY TO LEFT AXILLARY VEIN WITH  INTERPOSITIONAL ARTEGRAFT   performed by Christi Deras MD at [address]  2012    [address] Right 1/25/2019    INSERTION OF RIGHT INTERNAL JUGULAR HEMODIALYSIS CATHETER performed by Christi Deras MD at [address] [facility]  08/17/2018    1.  Moderately increased stainable iron identified by special stain in Kupffer cells and occasional hepatocytes. Negative for evidence of significant portal or lobular inflammation. Negative for evidence of significant fibrosis    [place] COLONOSCOPY W/BIOPSY SINGLE/MULTIPLE N/A 10/20/2017    Dr Zuniga Jorge prep-Tubular AP (-) dysplasia x 2--3 yr recall    [place] EGD TRANSORAL BIOPSY SINGLE/MULTIPLE N/A 12/27/2016    Dr Alvarez Pry EGD TRANSORAL BIOPSY SINGLE/MULTIPLE N/A 10/20/2017    Dr Flory Covarrubias (-)   [address] [facility] VASCULAR SURGERY Left 2016    fistula by Dr Baez [place] at [address]  10/02/2017    [facility]. Left upper fistulograms/venograms, balloon angioplasty cephalic vein arch 67G10 conquest.    VASCULAR SURGERY  08/2018    FISTULOGRAM    VASCULAR SURGERY  10/29/2018    [facility]. Left upper fistulograms/venograms    VASCULAR SURGERY  12/19/2018    [facility]. Arch aortogram,left upper arteriograms.     VASCULAR SURGERY  03/06/2019 [facility].Removal of tunneled dialyis catheter right internal jugular vein.  VASCULAR SURGERY  2019    [facility]. Left upper extremity fistulogram including venography to the superior vena cava. Balloon angioplasty left subclavian vein with 10mm x 40mm conquest balloon. Balloon angioplasty mid/proximal upper arm cephalic vein with 26UX x 40mm conquest balloon. Venograms after balloon angioplasty. Stent left mid/proximal upper arm cephalic vein stenosis fluency 10mm x 60mm self-expanding covered stent. Balloon     VASCULAR SURGERY  2019    cont angioplasty stent with 10mm x 40mm conquest balloon. Completion venograms left upper extremity.        Family History  Family History   Problem Relation Age of Onset    Colon Cancer Mother          at 63    Colon Polyps Mother     Lung Cancer Father          at 79    Colon Polyps Father     Stomach Cancer Sister     Breast Cancer Sister 27    Depression Daughter 25        committed suicide    Liver Cancer Neg Hx     Liver Disease Neg Hx     Esophageal Cancer Neg Hx     Rectal Cancer Neg Hx        Social History  Social History     Socioeconomic History    Marital status: Single     Spouse name: Not on file    Number of children: 2    Years of education: Not on file    Highest education level: Not on file   Occupational History    Not on file   Social Needs    Financial resource strain: Not on file    Food insecurity     Worry: Not on file     Inability: Not on file    Transportation needs     Medical: Not on file     Non-medical: Not on file   Tobacco Use    Smoking status: Current Every Day Smoker     Packs/day: 0.50     Years: 33.00     Pack years: 16.50     Types: Cigarettes    Smokeless tobacco: Never Used    Tobacco comment: NOW at 1/4 PD, started at age 24 and has averaged 2 PPD   Substance and Sexual Activity    Alcohol use: No    Drug use: Not Currently     Types: Marijuana    Sexual activity: Not Currently     Partners: Male   Lifestyle hours.  Phosphorus:No results for input(s): PHOS in the last 72 hours. HgbA1C: No results for input(s): LABA1C in the last 72 hours. Hepatic:   Recent Labs     04/29/21  0646   ALKPHOS 101   ALT 9   AST 11   PROT 7.4   BILITOT 0.4   LABALBU 3.6     Lactic Acid: No results for input(s): LACTA in the last 72 hours. Troponin: No results for input(s): CKTOTAL, CKMB, TROPONINT in the last 72 hours. ABGs: No results for input(s): PH, PCO2, PO2, HCO3, O2SAT in the last 72 hours. CRP:  No results for input(s): CRP in the last 72 hours. Sed Rate:  No results for input(s): SEDRATE in the last 72 hours. Cultures:   No results for input(s): CULTURE in the last 72 hours. No results for input(s): BC, Lawrnce Diones in the last 72 hours. No results for input(s): CXSURG in the last 72 hours. Radiology reports as per the Radiologist  Radiology: Xr Knee Left (1-2 Views)    Result Date: 4/29/2021  Examination. XR KNEE LEFT (1-2 VIEWS) 4/29/2021 6:14 AM History: The patient fell and complains of pain in the left knee. Frontal and lateral views of the left knee are compared with the previous study dated 2/24/2021. There is no evidence of recent fracture or dislocation. There is moderate diffuse osteopenia. No focal bony erosion. No soft tissue swelling or a mass. There is moderate diffuse narrowing of the tibiofemoral joint space similar to the previous study. No significant osteophytes. No joint effusion. The severe atheromatous changes of the popliteal and tibial arteries are seen. No acute bony abnormality. A moderate diffuse osteopenia. A mild to moderate chronic osteoarthritis. Signed by Dr Leda Clarke on 4/29/2021 7:37 AM    Ct Chest W Contrast    Result Date: 4/29/2021  Examination. CT CHEST W CONTRAST 4/29/2021 6:42 AM History: The patient fell and complains of left-sided chest pain. DLP: 205 mGycm. The CT scan of the chest is performed after intravenous contrast enhancement.  The images are acquired in axial plane and subsequent reconstruction in coronal and sagittal planes. There is no previous similar study for comparison. The correlation is made with chest radiograph dated 10/24/2020. The lungs are moderately well-expanded. There is an ill-defined groundglass infiltrate in the left lower lobe. There are atelectatic changes in the lingular segment of the left upper lobe. There is an area of pleural thickening involving the right lower lung laterally, in the lateral part of the inferolateral right major fissure which appears to be present a chronic process/inflammation/scarring. There is another ill-defined density in the right middle lobe posteriorly, adjacent and hit into the right major fissure which also represent a of scarring. No discrete solid complements/nodule. No areas of focal consolidation or infiltrate. No pneumothorax. The limited included soft tissues of the neck. Unremarkable. There is enlargement of the cardiac lobe of the thyroid gland which is extending posteriorly adjacent and to the left of the esophagus. There is an ill-defined nodule in this part of the thyroid gland. There is no axillary lymphadenopathy. Atheromatous changes of the thoracic aorta are seen. No aneurysmal dilatation. A moderate diffuse dilatation of the main pulmonary artery and right and left pulmonary arteries. No filling defect in the opacified pulmonary arterial bed. Atheromatous changes of coronary arteries are seen. There are a few borderline prominent mediastinal lymph nodes. A precarinal lymph node measures 1.1 cm in short axis. A level 4L lymph node measures 1.2 cm in short axis. No abnormal hilar lymph nodes are seen. The limited visualized liver and spleen are unremarkable. The gallbladder is surgically absent. The adrenal glands are unremarkable. The pancreas and kidneys are incompletely visualized and not evaluated.  The images reviewed in bone window show deformity of the left second rib anteriorly which is represent lack of intra-abdominal fat but I do not see evidence of obstructive uropathy or discrete ureteral stone. Oberlin Kung RETROPERITONEUM: No mass, lymphadenopathy or hemorrhage. GI TRACT: No evidence of obstruction or bowel wall thickening. The appendix is not well-visualized. . OTHER: There is no mesenteric mass, lymphadenopathy or fluid collection. The abdominopelvic vasculature is patent. The osseous structures and soft tissues demonstrate no worrisome lesions. No evidence of free fluid or hemoperitoneum. There is some mild stranding within the subcutaneous tissues rather diffusely suggesting an element of anasarca/volume overload. PELVIS: No mass lesion, fluid collection or significant lymphadenopathy is seen in the pelvis. The urinary bladder is normal in appearance. 1. Nondisplaced fractures are noted of the left anterolateral sixth, seventh and eighth ribs. Mild left basilar atelectasis is present. 2. Both kidneys are atrophic in appearance. There is some heterogeneous enhancement involving the mid and upper pole of the left kidney perhaps representing pyelonephritis. No perinephric fluid collections are present. No evidence of obstructive uropathy. 3. No evidence of soft tissue organ injury or hemoperitoneum. There is a normal bowel gas pattern. 4. Mild stranding noted rather diffusely of the subcutaneous fat within both the abdomen and pelvis and lower chest. This would suggest an element of anasarca/body wall edema. . Signed by Dr Alexandria Ngo on 4/29/2021 8:22 AM    Xr Hip 2-3 Vw W Pelvis Left    Result Date: 4/29/2021  History: Fall with pain Left hip: Frontal view the pelvis is obtained with frontal and frog-leg lateral views left hip. COMPARISON: 3/12/2021 FINDINGS: There is osteopenia. No fracture or dislocation identified. Hip joint spaces are preserved. Diffuse vascular calcifications. Small pelvic phleboliths. 1. Osteopenia with no acute osseous pathology identified.  Signed by Dr Rhoda Bay on 4/29/2021 7:34 AM       Assessment   1. ESRD  2. Type 2 DM with renal disease  3. Hypertension  4. Abdominal pain  5. [place] /vomiting   6. S/p recent fall  7. COPD  8. Hyonatremia       Plan:  Dialysis as above. Follow up labs. Provide antiemetics.       Camila Lang MD  04/29/21  4:57 PM

## 2021-04-29 NOTE — CARE COORDINATION
Date / Time of Evaluation: 4/29/2021 11:22 AM  Assessment Completed by: Sahra Durant    Patient Admission Status: Inpatient [101]    [address]    [phone] (home)   Telephone Information:   Mobile [phone]       (Best Practice:  Have patient / caregiver verify above address and phone number by stating out loud their current address and reachable phone number.)  Is above information correct? yes      Current PCP:  ROMINA Loyd  PCP verified? yes    Initial Assessment Completed at bedside with:  yes    Emergency Contacts:  Extended Emergency Contact Information  Primary Emergency Contact: Garett Melo [address] Phone: [phone]  Mobile Phone: [phone]  Relation: Other  Secondary Emergency Contact: Angela Villafana. Home Phone: [phone]  Mobile Phone: [phone]  Relation: Child    Advance Directives: Code Status:  Prior    Financial:  Payor: [employer] / Plan: [employer]  / Product Type: *No Product type* /     Pre-Cert required for SNF:  yes    Have Long Term Care Insurance:  no    Pharmacy:   [id number] - [id number] [address]anda [address]una [address] [facility] [address] [facility] [address]  Phone: [phone] Fax: [facility], [address] [place] [address] [phone] [facility] [phone]  [address] [place] [address] [place] [address]  [address] [facility] [address]  Phone: [phone] Fax: [phone]      Potential assistance purchasing medications? No. Stated her medications are delivered through mail    ADLS:  Support System:   Able to do everything alone.  Local family support - sister lives 2 houses down    Current Home Environment:  Lives alone  Steps:  yes    Plans to RETURN to current housing: yes  Barriers to RETURNING to current housing:  no    Currently ACTIVE with Home Health CARE:  no  [facility]:  no    DME Provider:  no    Has a pulse oximetry unit at home: no    Had HOME OXYGEN prior to admission:  no  Oxygen Company:  no  Informed of need to bring portable home O2 tank to hospital on day of DISCHARGE:  no  Name of person committed to bringing portable tank at discharge:  no    Active with HD/PD prior to admission: yes  Nephrologist:  Dr. John Riggs:  [facility]    Transition Plan:       Transportation PLAN for Discharge:  \"I can't promise nothing, I may need a cab\" When asked if someone could come pick her up at dc    Factors facilitating achievement of predicted outcomes: none    Barriers to discharge:  none      Additional CM/SW Notes: SW met with pt at bedside. Pt stated she had fall and now has closed fractures on multiple ribs on her left side. Denied any needs at dc. Ming Corrales and/or her family were provided with choice of provider.         [place]   Care Management Department  Ph:  [phone]  Fax: [phone]

## 2021-04-30 LAB
ANION GAP SERPL CALCULATED.3IONS-SCNC: 15 MMOL/L (ref 7–19)
BUN BLDV-MCNC: 28 MG/DL (ref 6–20)
CALCIUM SERPL-MCNC: 9.6 MG/DL (ref 8.6–10)
CHLORIDE BLD-SCNC: 94 MMOL/L (ref 98–111)
CO2: 28 MMOL/L (ref 22–29)
CREAT SERPL-MCNC: 4.3 MG/DL (ref 0.5–0.9)
GFR AFRICAN AMERICAN: 13
GFR NON-AFRICAN AMERICAN: 11
GLUCOSE BLD-MCNC: 126 MG/DL (ref 70–99)
GLUCOSE BLD-MCNC: 230 MG/DL (ref 70–99)
GLUCOSE BLD-MCNC: 282 MG/DL (ref 70–99)
GLUCOSE BLD-MCNC: 295 MG/DL (ref 74–109)
GLUCOSE BLD-MCNC: 42 MG/DL (ref 70–99)
GLUCOSE BLD-MCNC: 49 MG/DL (ref 70–99)
GLUCOSE BLD-MCNC: 53 MG/DL (ref 70–99)
GLUCOSE BLD-MCNC: 61 MG/DL (ref 70–99)
GLUCOSE BLD-MCNC: 68 MG/DL (ref 70–99)
GLUCOSE BLD-MCNC: 70 MG/DL (ref 70–99)
GLUCOSE BLD-MCNC: 83 MG/DL (ref 70–99)
GLUCOSE BLD-MCNC: 91 MG/DL (ref 70–99)
HCT VFR BLD CALC: 33.7 % (ref 37–47)
HEMOGLOBIN: 10.3 G/DL (ref 12–16)
MCH RBC QN AUTO: 29 PG (ref 27–31)
MCHC RBC AUTO-ENTMCNC: 30.6 G/DL (ref 33–37)
MCV RBC AUTO: 94.9 FL (ref 81–99)
PDW BLD-RTO: 18.9 % (ref 11.5–14.5)
PERFORMED ON: ABNORMAL
PERFORMED ON: NORMAL
PLATELET # BLD: 203 K/UL (ref 130–400)
PMV BLD AUTO: 9.6 FL (ref 9.4–12.3)
POTASSIUM REFLEX MAGNESIUM: 3.7 MMOL/L (ref 3.5–5)
RBC # BLD: 3.55 M/UL (ref 4.2–5.4)
SODIUM BLD-SCNC: 137 MMOL/L (ref 136–145)
T4 FREE: 1.06 NG/DL (ref 0.93–1.7)
TSH REFLEX FT4: 5.54 UIU/ML (ref 0.35–5.5)
WBC # BLD: 5.5 K/UL (ref 4.8–10.8)

## 2021-04-30 PROCEDURE — G0378 HOSPITAL OBSERVATION PER HR: HCPCS

## 2021-04-30 PROCEDURE — 96367 TX/PROPH/DG ADDL SEQ IV INF: CPT

## 2021-04-30 PROCEDURE — 84439 ASSAY OF FREE THYROXINE: CPT

## 2021-04-30 PROCEDURE — 36415 COLL VENOUS BLD VENIPUNCTURE: CPT

## 2021-04-30 PROCEDURE — 85027 COMPLETE CBC AUTOMATED: CPT

## 2021-04-30 PROCEDURE — 82947 ASSAY GLUCOSE BLOOD QUANT: CPT

## 2021-04-30 PROCEDURE — 6360000002 HC RX W HCPCS: Performed by: HOSPITALIST

## 2021-04-30 PROCEDURE — 6370000000 HC RX 637 (ALT 250 FOR IP): Performed by: HOSPITALIST

## 2021-04-30 PROCEDURE — 96376 TX/PRO/DX INJ SAME DRUG ADON: CPT

## 2021-04-30 PROCEDURE — 2580000003 HC RX 258: Performed by: HOSPITALIST

## 2021-04-30 PROCEDURE — 80048 BASIC METABOLIC PNL TOTAL CA: CPT

## 2021-04-30 PROCEDURE — 96366 THER/PROPH/DIAG IV INF ADDON: CPT

## 2021-04-30 PROCEDURE — 84443 ASSAY THYROID STIM HORMONE: CPT

## 2021-04-30 PROCEDURE — 1210000000 HC MED SURG R&B

## 2021-04-30 PROCEDURE — 94640 AIRWAY INHALATION TREATMENT: CPT

## 2021-04-30 RX ORDER — LACTULOSE 10 G/15ML
20 SOLUTION ORAL 3 TIMES DAILY
Qty: 2700 ML | Refills: 1 | Status: SHIPPED | OUTPATIENT
Start: 2021-04-30 | End: 2021-05-30

## 2021-04-30 RX ORDER — INSULIN GLARGINE 100 [IU]/ML
10 INJECTION, SOLUTION SUBCUTANEOUS NIGHTLY
Status: DISCONTINUED | OUTPATIENT
Start: 2021-04-30 | End: 2021-04-30

## 2021-04-30 RX ADMIN — HEPARIN SODIUM 5000 UNITS: 5000 INJECTION INTRAVENOUS; SUBCUTANEOUS at 21:02

## 2021-04-30 RX ADMIN — IPRATROPIUM BROMIDE AND ALBUTEROL SULFATE 1 AMPULE: .5; 3 SOLUTION RESPIRATORY (INHALATION) at 06:50

## 2021-04-30 RX ADMIN — DULOXETINE HYDROCHLORIDE 60 MG: 30 CAPSULE, DELAYED RELEASE ORAL at 07:40

## 2021-04-30 RX ADMIN — METOPROLOL SUCCINATE 100 MG: 50 TABLET, EXTENDED RELEASE ORAL at 05:04

## 2021-04-30 RX ADMIN — HYDRALAZINE HYDROCHLORIDE 75 MG: 50 TABLET, FILM COATED ORAL at 04:37

## 2021-04-30 RX ADMIN — HEPARIN SODIUM 5000 UNITS: 5000 INJECTION INTRAVENOUS; SUBCUTANEOUS at 04:37

## 2021-04-30 RX ADMIN — ROPINIROLE HYDROCHLORIDE 4 MG: 4 TABLET, FILM COATED ORAL at 21:02

## 2021-04-30 RX ADMIN — NIFEDIPINE 60 MG: 60 TABLET, EXTENDED RELEASE ORAL at 07:40

## 2021-04-30 RX ADMIN — LACTULOSE 20 G: 20 SOLUTION ORAL at 07:40

## 2021-04-30 RX ADMIN — DEXTROSE MONOHYDRATE 100 ML/HR: 50 INJECTION, SOLUTION INTRAVENOUS at 17:36

## 2021-04-30 RX ADMIN — ONDANSETRON HYDROCHLORIDE 4 MG: 2 SOLUTION INTRAMUSCULAR; INTRAVENOUS at 04:37

## 2021-04-30 RX ADMIN — SEVELAMER CARBONATE 800 MG: 800 TABLET, FILM COATED ORAL at 07:40

## 2021-04-30 RX ADMIN — SEVELAMER CARBONATE 800 MG: 800 TABLET, FILM COATED ORAL at 11:59

## 2021-04-30 RX ADMIN — LINACLOTIDE 290 MCG: 145 CAPSULE, GELATIN COATED ORAL at 05:04

## 2021-04-30 RX ADMIN — IPRATROPIUM BROMIDE AND ALBUTEROL SULFATE 1 AMPULE: .5; 3 SOLUTION RESPIRATORY (INHALATION) at 18:31

## 2021-04-30 RX ADMIN — HYDROCHLOROTHIAZIDE 50 MG: 25 TABLET ORAL at 07:40

## 2021-04-30 RX ADMIN — ISOSORBIDE MONONITRATE 120 MG: 60 TABLET, EXTENDED RELEASE ORAL at 07:40

## 2021-04-30 RX ADMIN — HYDROCODONE BITARTRATE AND ACETAMINOPHEN 1 TABLET: 5; 325 TABLET ORAL at 18:12

## 2021-04-30 RX ADMIN — LEVOTHYROXINE SODIUM 150 MCG: 75 TABLET ORAL at 05:04

## 2021-04-30 RX ADMIN — LISINOPRIL 20 MG: 10 TABLET ORAL at 07:40

## 2021-04-30 RX ADMIN — HYDROCODONE BITARTRATE AND ACETAMINOPHEN 1 TABLET: 5; 325 TABLET ORAL at 04:38

## 2021-04-30 RX ADMIN — ONDANSETRON HYDROCHLORIDE 4 MG: 2 SOLUTION INTRAMUSCULAR; INTRAVENOUS at 12:23

## 2021-04-30 RX ADMIN — HYDRALAZINE HYDROCHLORIDE 75 MG: 50 TABLET, FILM COATED ORAL at 21:01

## 2021-04-30 RX ADMIN — Medication 10 ML: at 07:40

## 2021-04-30 RX ADMIN — ATORVASTATIN CALCIUM 20 MG: 20 TABLET, FILM COATED ORAL at 07:40

## 2021-04-30 RX ADMIN — STANDARDIZED SENNA CONCENTRATE 8.6 MG: 8.6 TABLET ORAL at 07:40

## 2021-04-30 RX ADMIN — SEVELAMER CARBONATE 800 MG: 800 TABLET, FILM COATED ORAL at 17:29

## 2021-04-30 RX ADMIN — ONDANSETRON HYDROCHLORIDE 4 MG: 2 SOLUTION INTRAMUSCULAR; INTRAVENOUS at 21:02

## 2021-04-30 RX ADMIN — LISINOPRIL 20 MG: 10 TABLET ORAL at 21:01

## 2021-04-30 ASSESSMENT — PAIN SCALES - GENERAL
PAINLEVEL_OUTOF10: 6
PAINLEVEL_OUTOF10: 10

## 2021-04-30 NOTE — PROGRESS NOTES
Marli Brown arrived to room # 317. Presented with: hypertension  Mental Status: Patient is oriented and alert. Vitals:    04/30/21 0035   BP: (!) 148/78   Pulse: 106   Resp: 16   Temp: 97.6 °F (36.4 °C)   SpO2: 94%     Patient safety contract and falls prevention contract reviewed with patient Yes. Oriented Patient to room. Call light within reach. Yes.   Needs, issues or concerns expressed at this time: no.      Electronically signed by Anastasiia Castillo RN on 4/30/2021 at 1:29 AM

## 2021-04-30 NOTE — DISCHARGE SUMMARY
noted, rebound noted. Extremities: no tenderness, no edema, moves all extremities  Psych: Affect normal and good eye contact, behavioral normal.          Consultants:   IP CONSULT TO NEPHROLOGY  IP CONSULT TO NEPHROLOGY  PALLIATIVE CARE EVAL    Time Spent on Discharge:  40 minutes were spent in patient examination, evaluation, counseling as well as medication reconciliation, prescriptions for required medications, discharge plan and follow up. Surgeries/Procedures Performed:  NONE      Significant Diagnostic Studies:   Recent Labs:  CBC:   Lab Results   Component Value Date    WBC 5.5 04/30/2021    RBC 3.55 04/30/2021    HGB 10.3 04/30/2021    HCT 33.7 04/30/2021    MCV 94.9 04/30/2021    MCH 29.0 04/30/2021    MCHC 30.6 04/30/2021    RDW 18.9 04/30/2021     04/30/2021     BMP:    Lab Results   Component Value Date    GLUCOSE 295 04/30/2021     04/30/2021    K 3.7 04/30/2021    CL 94 04/30/2021    CO2 28 04/30/2021    ANIONGAP 15 04/30/2021    BUN 28 04/30/2021    CREATININE 4.3 04/30/2021    CALCIUM 9.6 04/30/2021    LABGLOM 11 04/30/2021    GFRAA 13 04/30/2021       Radiology Last 7 Days:  Xr Knee Left (1-2 Views)    Result Date: 4/29/2021  No acute bony abnormality. A moderate diffuse osteopenia. A mild to moderate chronic osteoarthritis. Signed by Dr Radhames Mccray on 4/29/2021 7:37 AM    Ct Chest W Contrast    Result Date: 4/29/2021  An ill-defined groundglass infiltrate in the left lower lung may represent an evolving pneumonia/inflammatory process. Further follow-up is suggested. The scattered areas of pleural thickening and scarring representing a chronic process. Pulmonary arterial hypertension. Right thyroid nodule may be further correlated with sonography. No acute bony abnormality. Signed by Dr Radhames Mccray on 4/29/2021 8:00 AM    Ct Abdomen Pelvis W Iv Contrast Additional Contrast? None    Result Date: 4/29/2021  1.  Nondisplaced fractures are noted of the left anterolateral sixth, seventh and eighth ribs. Mild left basilar atelectasis is present. 2. Both kidneys are atrophic in appearance. There is some heterogeneous enhancement involving the mid and upper pole of the left kidney perhaps representing pyelonephritis. No perinephric fluid collections are present. No evidence of obstructive uropathy. 3. No evidence of soft tissue organ injury or hemoperitoneum. There is a normal bowel gas pattern. 4. Mild stranding noted rather diffusely of the subcutaneous fat within both the abdomen and pelvis and lower chest. This would suggest an element of anasarca/body wall edema. . Signed by Dr Lynne Oliver on 4/29/2021 8:22 AM    Xr Hip 2-3 Vw W Pelvis Left    Result Date: 4/29/2021  1. Osteopenia with no acute osseous pathology identified.  Signed by Dr Maryann Arnold on 4/29/2021 7:34 AM      Discharge Plan   Disposition: Home    Provider Follow-Up:   ROMINA Cyr  [address] [facility]  [phone]    Go on 5/7/2021  Hospital follow-up on 5/7/21 @ 10:30am.          Patient Instructions   Diet: diabetic diet and renal diet    Activity: activity as tolerated      Discharge Medications         Medication List      START taking these medications    lactulose 10 GM/15ML solution  Commonly known as: CHRONULAC  Take 30 mLs by mouth 3 times daily        CONTINUE taking these medications    albuterol sulfate  (90 Base) MCG/ACT inhaler     cloNIDine 0.3 MG tablet  Commonly known as: CATAPRES  Take 1 tablet by mouth every 8 hours     DULoxetine 60 MG extended release capsule  Commonly known as: CYMBALTA     hydrALAZINE 50 MG tablet  Commonly known as: APRESOLINE  Take 1.5 tablets by mouth every 8 hours     hydroCHLOROthiazide 25 MG tablet  Commonly known as: HYDRODIURIL  Take 2 tablets by mouth daily     isosorbide mononitrate 120 MG extended release tablet  Commonly known as: IMDUR  Take 1 tablet by mouth daily     Lantus SoloStar 100 UNIT/ML injection pen  Generic drug: insulin glargine  Inject 40 Units into the skin daily     levothyroxine 150 MCG tablet  Commonly known as: SYNTHROID     linaclotide 290 MCG Caps capsule  Commonly known as: LINZESS  Take 1 capsule by mouth every morning (before breakfast)     lisinopril 20 MG tablet  Commonly known as: PRINIVIL;ZESTRIL  Take 1 tablet by mouth 2 times daily     metoprolol succinate 100 MG extended release tablet  Commonly known as: TOPROL XL  Take 1 tablet by mouth every morning (before breakfast)     NIFEdipine 60 MG extended release tablet  Commonly known as: ADALAT CC     NovoLOG FlexPen 100 UNIT/ML injection pen  Generic drug: insulin aspart     rOPINIRole 2 MG tablet  Commonly known as: REQUIP     Senna-Lax 8.6 MG tablet  Generic drug: senna     sevelamer 800 MG tablet  Commonly known as: RENVELA     simvastatin 40 MG tablet  Commonly known as: ZOCOR     Vitamin D2 10 MCG (400 UNIT) Tabs        STOP taking these medications    levETIRAcetam 500 MG tablet  Commonly known as: KEPPRA     nicotine 14 MG/24HR  Commonly known as: NICODERM CQ     ondansetron 4 MG disintegrating tablet  Commonly known as: ZOFRAN-ODT           Where to Get Your Medications      These medications were sent to [place] [id number] #[id number] [facility], [address] [facility] [place]  [address] [facility], [address] [place]    Phone: [phone]   · lactulose 10 GM/15ML solution         Electronically signed by Bo Agarwal MD on 4/30/21 at 12:02 PM CDT

## 2021-04-30 NOTE — PLAN OF CARE
Problem: Skin Integrity:  Goal: Will show no infection signs and symptoms  Description: Will show no infection signs and symptoms  Outcome: Ongoing  Goal: Absence of new skin breakdown  Description: Absence of new skin breakdown  Outcome: Ongoing  Goal: Status of oral mucous membranes will improve  Description: Status of oral mucous membranes will improve  Outcome: Ongoing  Goal: Skin integrity will be maintained  Description: Skin integrity will be maintained  Outcome: Ongoing     Problem: Falls - Risk of:  Goal: Will remain free from falls  Description: Will remain free from falls  Outcome: Ongoing  Goal: Absence of physical injury  Description: Absence of physical injury  Outcome: Ongoing     Problem:  Activity:  Goal: Fatigue will decrease  Description: Fatigue will decrease  Outcome: Ongoing  Goal: Risk for activity intolerance will decrease  Description: Risk for activity intolerance will decrease  Outcome: Ongoing     Problem: Coping:  Goal: Ability to cope will improve  Description: Ability to cope will improve  Outcome: Ongoing     Problem: Fluid Volume:  Goal: Will show no signs or symptoms of fluid imbalance  Description: Will show no signs or symptoms of fluid imbalance  Outcome: Ongoing     Problem: Health Behavior:  Goal: Ability to manage health-related needs will improve  Description: Ability to manage health-related needs will improve  Outcome: Ongoing  Goal: Identification of resources available to assist in meeting health care needs will improve  Description: Identification of resources available to assist in meeting health care needs will improve  Outcome: Ongoing     Problem: Nutritional:  Goal: Ability to identify appropriate dietary choices will improve  Description: Ability to identify appropriate dietary choices will improve  Outcome: Ongoing     Problem: Physical Regulation:  Goal: Ability to maintain clinical measurements within normal limits will improve  Description: Ability to maintain clinical measurements within normal limits will improve  Outcome: Ongoing  Goal: Complications related to the disease process, condition or treatment will be avoided or minimized  Description: Complications related to the disease process, condition or treatment will be avoided or minimized  Outcome: Ongoing     Problem: Sensory:  Goal: General experience of comfort will improve  Description: General experience of comfort will improve  Outcome: Ongoing     Problem: Pain:  Goal: Pain level will decrease  Description: Pain level will decrease  Outcome: Ongoing  Goal: Control of acute pain  Description: Control of acute pain  Outcome: Ongoing  Goal: Control of chronic pain  Description: Control of chronic pain  Outcome: Ongoing   Electronically signed by Madeleine Bloom RN on 4/30/2021 at 1:44 AM

## 2021-04-30 NOTE — ACP (ADVANCE CARE PLANNING)
Advance Care Planning     Advance Care Planning Activator (Inpatient)  Conversation Note      Date of ACP Conversation: 4/30/2021    Conversation Conducted with: Pt: Sharifa Cruz    ACP Activator: Nate1 N Luis/Richie Cruz Decision Maker:     Current Designated Health Care Decision Maker:     Primary Decision Maker: Aden Yoandy - Child - [phone]    Supplemental (Other) Decision Maker: Kathie Salmeron - Other - [phone]    Supplemental (Other) Decision Maker: Bhumika Ortez - Brother/Sister - [phone]    Care Preferences    Ventilation: \"If you were in your present state of health and suddenly became very ill and were unable to breathe on your own, what would your preference be about the use of a ventilator (breathing machine) if it were available to you? \"      Would the patient desire the use of ventilator (breathing machine)?:  YES          Resuscitation  \"CPR works best to restart the heart when there is a sudden event, like a heart attack, in someone who is otherwise healthy. Unfortunately, CPR does not typically restart the heart for people who have serious health conditions or who are very sick. \"    \"In the event your heart stopped as a result of an underlying serious health condition, would you want attempts to be made to restart your heart (answer \"yes\" for attempt to resuscitate) or would you prefer a natural death (answer \"no\" for do not attempt to resuscitate)? \"   YES           Conversation Outcomes:  [x] ACP discussion completed

## 2021-04-30 NOTE — PROGRESS NOTES
Nephrology ([facility]) Progress Note    Patient:  Almita Delcid  YOB: 1970  Date of Service: 4/30/2021  MRN: [id number]   Acct: [de-identified]   Primary Care Physician: Beronica Mijares APRDAVI  Advance Directive: Full Code  Admit Date: 4/29/2021       Hospital Day: 1  Referring Provider: Julito Collazo MD    Patient Seen, Chart, Consults notes, Labs, Radiology studies reviewed. Subjective:  Patient is a 46 yo pleasant woman who has HTN, Dm2, and ESRD. She is on chronic maintenance HD TIW. She recently sustained a fall with rib fractures. She was taking pain meds. She was having leg numbness. She denied head trauma. Since 4/28, she has been having upset stomach with nausea and vomiting. Today, she was sent to ED from her dialysis unit when she looked bad to them. Renal service was consulted to manage her ESRD. She has no fever, no dysuria. Patient was severely hypertensive on admission (/85). Dialysis was administered on April 29. Patient's course was benign. Her blood pressure improved. Today besides mild dyspnea that is chronic for her, she has been feeling well.     Allergies:  Penicillins, Lamisil advanced [terbinafine], Stadol [butorphanol], and Reglan [metoclopramide]    Medicines:  Current Facility-Administered Medications   Medication Dose Route Frequency Provider Last Rate Last Admin    insulin lispro (HUMALOG) injection vial 10 Units  10 Units Subcutaneous 4x Daily AC & HS Julito Collazo MD        nitroGLYCERIN (NITROSTAT) SL tablet 0.4 mg  0.4 mg Sublingual Q5 Min PRN Loco Casarez MD   0.4 mg at 04/29/21 0811    ipratropium-albuterol (DUONEB) nebulizer solution 1 ampule  1 ampule Inhalation Q4H WA Julito Collazo MD   1 ampule at 04/30/21 0650    DULoxetine (CYMBALTA) extended release capsule 60 mg  60 mg Oral Daily Julito Collazo MD   60 mg at 04/30/21 0740    hydrALAZINE (APRESOLINE) tablet 75 mg  75 mg Oral 3 times per day Julito Collazo MD 75 mg at 04/30/21 0437    hydroCHLOROthiazide (HYDRODIURIL) tablet 50 mg  50 mg Oral Daily Stacy Senior MD   50 mg at 04/30/21 0740    isosorbide mononitrate (IMDUR) extended release tablet 120 mg  120 mg Oral Daily Stacy Senior MD   120 mg at 04/30/21 0740    levothyroxine (SYNTHROID) tablet 150 mcg  150 mcg Oral Daily Stacy Senior MD   150 mcg at 04/30/21 0504    linaclotide (LINZESS) capsule 290 mcg  290 mcg Oral QAM AC Stacy Senior MD   290 mcg at 04/30/21 0504    lisinopril (PRINIVIL;ZESTRIL) tablet 20 mg  20 mg Oral BID Stacy Senior MD   20 mg at 04/30/21 0740    metoprolol succinate (TOPROL XL) extended release tablet 100 mg  100 mg Oral QAM AC Stacy Senior MD   100 mg at 04/30/21 0504    NIFEdipine (PROCARDIA XL) extended release tablet 60 mg  60 mg Oral Daily Stacy Senior MD   60 mg at 04/30/21 0740    rOPINIRole (REQUIP) tablet 4 mg  4 mg Oral Nightly Stacy Senior MD   4 mg at 04/29/21 2120    senna (SENOKOT) tablet 8.6 mg  1 tablet Oral Daily Stacy Senior MD   8.6 mg at 04/30/21 0740    sevelamer (RENVELA) tablet 800 mg  800 mg Oral TID WC Stacy Senior MD   800 mg at 04/30/21 0740    atorvastatin (LIPITOR) tablet 20 mg  20 mg Oral Daily Stacy Senior MD   20 mg at 04/30/21 0740    sodium chloride flush 0.9 % injection 5-40 mL  5-40 mL Intravenous 2 times per day Stacy Senoir MD   10 mL at 04/30/21 0740    sodium chloride flush 0.9 % injection 5-40 mL  5-40 mL Intravenous PRN Stacy Senior MD        0.9 % sodium chloride infusion  25 mL Intravenous PRN Stacy Senior MD        heparin (porcine) injection 5,000 Units  5,000 Units Subcutaneous 3 times per day Stacy Senior MD   5,000 Units at 04/30/21 0437    promethazine (PHENERGAN) tablet 12.5 mg  12.5 mg Oral Q6H PRN Stacy Senior MD        Or    ondansetron [facility]) injection 4 mg  4 mg Intravenous Q6H PRN Stacy Senior MD   4 mg at 04/30/21 6274    polyethylene glycol (GLYCOLAX) packet 17 g  17 g Oral Daily PRN Rebeca Lugo MD        acetaminophen (TYLENOL) tablet 650 mg  650 mg Oral Q6H PRN Rebeca Lugo MD        Or    acetaminophen (TYLENOL) suppository 650 mg  650 mg Rectal Q6H PRN Rebeca Lugo MD        magnesium sulfate 2000 mg in 50 mL IVPB premix  2,000 mg Intravenous PRN Rebeca Lugo MD        nicotine (NICODERM CQ) 21 MG/24HR 1 patch  1 patch Transdermal Daily Rebeca Lugo MD   1 patch at 04/30/21 0740    lactulose (CHRONULAC) 10 GM/15ML solution 20 g  20 g Oral TID Rebeca Lugo MD   20 g at 04/30/21 0740    insulin glargine (LANTUS) injection vial 20 Units  20 Units Subcutaneous Nightly Rebeca Lugo MD   20 Units at 04/29/21 2240    insulin lispro (HUMALOG) injection vial 0-12 Units  0-12 Units Subcutaneous TID WC Rebeca Lugo MD   6 Units at 04/30/21 0741    insulin lispro (HUMALOG) injection vial 0-6 Units  0-6 Units Subcutaneous Nightly Rebeca Lugo MD   1 Units at 04/29/21 2241    glucose (GLUTOSE) 40 % oral gel 15 g  15 g Oral PRN Rebeca Lugo MD        dextrose 50 % IV solution  12.5 g Intravenous PRN Rebeca Lugo MD        glucagon (rDNA) injection 1 mg  1 mg Intramuscular PRN Rebeca Lugo MD        dextrose 5 % solution  100 mL/hr Intravenous PRN Rebeca Lugo MD        HYDROcodone-acetaminophen [facility]) 5-325 MG per tablet 1 tablet  1 tablet Oral Q6H PRN Rebeca Lugo MD   1 tablet at 04/30/21 3161       Past Medical History:  Past Medical History:   Diagnosis Date    Arthritis     Chronic kidney disease, stage 5, kidney failure ([facility] [address]     Closed fracture of right shoulder girdle, with routine healing, subsequent encounter     DM (diabetes mellitus) type II controlled with renal manifestation ([facility] [address] 06/1993    Gastroparesis     GERD (gastroesophageal reflux disease)     Hemodialysis patient ([facility] [address]     dialysis on tues, thur, and sat at [facility]    Hypertension     Hypothyroid     fistulogram including venography to the superior vena cava. Balloon angioplasty left subclavian vein with 10mm x 40mm conquest balloon. Balloon angioplasty mid/proximal upper arm cephalic vein with 78LX x 40mm conquest balloon. Venograms after balloon angioplasty. Stent left mid/proximal upper arm cephalic vein stenosis fluency 10mm x 60mm self-expanding covered stent. Balloon     VASCULAR SURGERY  2019    cont angioplasty stent with 10mm x 40mm conquest balloon. Completion venograms left upper extremity.        Family History  Family History   Problem Relation Age of Onset    Colon Cancer Mother          at 63    Colon Polyps Mother     Lung Cancer Father          at 79    Colon Polyps Father     Stomach Cancer Sister     Breast Cancer Sister 27    Depression Daughter 25        committed suicide    Liver Cancer Neg Hx     Liver Disease Neg Hx     Esophageal Cancer Neg Hx     Rectal Cancer Neg Hx        Social History  Social History     Socioeconomic History    Marital status: Single     Spouse name: Not on file    Number of children: 2    Years of education: Not on file    Highest education level: Not on file   Occupational History    Not on file   Social Needs    Financial resource strain: Not on file    Food insecurity     Worry: Not on file     Inability: Not on file    Transportation needs     Medical: Not on file     Non-medical: Not on file   Tobacco Use    Smoking status: Current Every Day Smoker     Packs/day: 0.50     Years: 33.00     Pack years: 16.50     Types: Cigarettes    Smokeless tobacco: Never Used    Tobacco comment: NOW at 1/4 PD, started at age 24 and has averaged 2 PPD   Substance and Sexual Activity    Alcohol use: No    Drug use: Not Currently     Types: Marijuana    Sexual activity: Not Currently     Partners: Male   Lifestyle    Physical activity     Days per week: Not on file     Minutes per session: Not on file    Stress: Not on file   Relationships    osteoarthritis. Signed by Dr Radhames Mccray on 4/29/2021 7:37 AM    Ct Chest W Contrast    Result Date: 4/29/2021  Examination. CT CHEST W CONTRAST 4/29/2021 6:42 AM History: The patient fell and complains of left-sided chest pain. DLP: 205 mGycm. The CT scan of the chest is performed after intravenous contrast enhancement. The images are acquired in axial plane and subsequent reconstruction in coronal and sagittal planes. There is no previous similar study for comparison. The correlation is made with chest radiograph dated 10/24/2020. The lungs are moderately well-expanded. There is an ill-defined groundglass infiltrate in the left lower lobe. There are atelectatic changes in the lingular segment of the left upper lobe. There is an area of pleural thickening involving the right lower lung laterally, in the lateral part of the inferolateral right major fissure which appears to be present a chronic process/inflammation/scarring. There is another ill-defined density in the right middle lobe posteriorly, adjacent and hit into the right major fissure which also represent a of scarring. No discrete solid complements/nodule. No areas of focal consolidation or infiltrate. No pneumothorax. The limited included soft tissues of the neck. Unremarkable. There is enlargement of the cardiac lobe of the thyroid gland which is extending posteriorly adjacent and to the left of the esophagus. There is an ill-defined nodule in this part of the thyroid gland. There is no axillary lymphadenopathy. Atheromatous changes of the thoracic aorta are seen. No aneurysmal dilatation. A moderate diffuse dilatation of the main pulmonary artery and right and left pulmonary arteries. No filling defect in the opacified pulmonary arterial bed. Atheromatous changes of coronary arteries are seen. There are a few borderline prominent mediastinal lymph nodes. A precarinal lymph node measures 1.1 cm in short axis.  A level 4L lymph node measures 1.2 cm in short axis. No abnormal hilar lymph nodes are seen. The limited visualized liver and spleen are unremarkable. The gallbladder is surgically absent. The adrenal glands are unremarkable. The pancreas and kidneys are incompletely visualized and not evaluated. The images reviewed in bone window show deformity of the left second rib anteriorly which is represent an old healed fracture. No acute fracture or acute bony abnormality seen. A prominent Schmorl node is seen in the superior endplate of vertebra L2. An ill-defined groundglass infiltrate in the left lower lung may represent an evolving pneumonia/inflammatory process. Further follow-up is suggested. The scattered areas of pleural thickening and scarring representing a chronic process. Pulmonary arterial hypertension. Right thyroid nodule may be further correlated with sonography. No acute bony abnormality. Signed by Dr Ling Jarquin on 4/29/2021 8:00 AM    Ct Abdomen Pelvis W Iv Contrast Additional Contrast? None    Result Date: 4/29/2021  CT ABDOMEN PELVIS W IV CONTRAST 4/29/2021 6:27 AM HISTORY: Left rib pain after fall COMPARISON: None. DLP: 205 mGy cm. Automated exposure control was utilized to diminish patient radiation dose. TECHNIQUE: Following the intravenous administration of contrast, helical CT tomographic images of the abdomen and pelvis were acquired. Coronal reformatted images were also provided for review. FINDINGS: Mild left basilar subsegmental atelectasis is present. Nondisplaced fractures are noted in the left anterolateral sixth, seventh and eighth ribs. No evidence of basilar contusion or pneumothorax. The base of the heart is unremarkable. Ivan Francesco LIVER: No focal liver lesion. The hepatic vasculature is patent. BILIARY SYSTEM: The gallbladder has been surgically removed. No evidence of intra or extrahepatic biliary dilatation. Ivan Francesco PANCREAS: No focal pancreatic lesion. SPLEEN: Splenic granulomas are present. There is no evidence of splenomegaly. Ivan Francesco KIDNEYS AND ADRENALS: The adrenals are unremarkable. Both kidneys are atrophic in appearance. There is some mild heterogeneous enhancement of the upper pole of the left kidney does raise a differential of pyelonephritis. No perinephric fluid collections are present. There is no evidence of nephrolithiasis. . The ureters are difficult to trace secondary to the lack of intra-abdominal fat but I do not see evidence of obstructive uropathy or discrete ureteral stone. Dusty Rich RETROPERITONEUM: No mass, lymphadenopathy or hemorrhage. GI TRACT: No evidence of obstruction or bowel wall thickening. The appendix is not well-visualized. . OTHER: There is no mesenteric mass, lymphadenopathy or fluid collection. The abdominopelvic vasculature is patent. The osseous structures and soft tissues demonstrate no worrisome lesions. No evidence of free fluid or hemoperitoneum. There is some mild stranding within the subcutaneous tissues rather diffusely suggesting an element of anasarca/volume overload. PELVIS: No mass lesion, fluid collection or significant lymphadenopathy is seen in the pelvis. The urinary bladder is normal in appearance. 1. Nondisplaced fractures are noted of the left anterolateral sixth, seventh and eighth ribs. Mild left basilar atelectasis is present. 2. Both kidneys are atrophic in appearance. There is some heterogeneous enhancement involving the mid and upper pole of the left kidney perhaps representing pyelonephritis. No perinephric fluid collections are present. No evidence of obstructive uropathy. 3. No evidence of soft tissue organ injury or hemoperitoneum. There is a normal bowel gas pattern. 4. Mild stranding noted rather diffusely of the subcutaneous fat within both the abdomen and pelvis and lower chest. This would suggest an element of anasarca/body wall edema. . Signed by Dr Stephanie Melo on 4/29/2021 8:22 AM    Xr Hip 2-3 Vw W Pelvis Left    Result Date: 4/29/2021  History: Fall with pain Left hip: Frontal view the pelvis is obtained with frontal and frog-leg lateral views left hip. COMPARISON: 3/12/2021 FINDINGS: There is osteopenia. No fracture or dislocation identified. Hip joint spaces are preserved. Diffuse vascular calcifications. Small pelvic phleboliths. 1. Osteopenia with no acute osseous pathology identified. Signed by Dr Gina Davison on 4/29/2021 7:34 AM       Assessment   1. ESRD  2. Type 2 DM with renal disease  3. Hypertension  4. Abdominal pain  5. [place] /vomiting   6. S/p recent fall  7. COPD  8. Hyonatremia    Plan:  Patient is due for dialysis next on May 1. Today she is not too symptomatic. Her nausea and upset stomach have improved. If she is being discharged, she may follow-up with her outpatient dialysis unit as scheduled.

## 2021-04-30 NOTE — PROGRESS NOTES
4 Eyes Skin Assessment    Naveed Almaguer is being assessed upon: Admission    I agree that I, Tenzin Espinal, along with Sung Anger    (either 2 RN's or 1 LPN and 1 RN) have performed a thorough Head to Toe Skin Assessment on the patient. ALL assessment sites listed below have been assessed. Areas assessed by both nurses:     [x]   Head, Face, and Ears   [x]   Shoulders, Back, and Chest  [x]   Arms, Elbows, and Hands   [x]   Coccyx, Sacrum, and Ischium  [x]   Legs, Feet, and Heels     Does the Patient have Skin Breakdown?  No     Sacral redness    Marek Prevention initiated: Yes  Wound Care Orders initiated: No    [facility] nurse consulted for Pressure Injury (Stage 3,4, Unstageable, DTI, NWPT, and Complex wounds) and New or Established Ostomies: No        Primary Nurse eSignature: Tenzin Espinal RN on 4/30/2021 at 1:29 AM      Co-Signer eSignature: Electronically signed by Avi Ortiz RN on 4/30/21 at 1:30 AM CDT

## 2021-04-30 NOTE — PROGRESS NOTES
Post meal BS with D5 gtt going at 100cc/hr is 68mg/dl. Dr. Gavin Garrett notified, insulin orders cancelled and verbal orders given to continue dextrose gtt at 75cc/hr and to continue to monitor.      Electronically signed by Joselin Gunter RN on 4/30/2021 at 6:27 PM

## 2021-04-30 NOTE — CONSULTS
Palliative Care:   Pt known to Palliative Care. She presented yesterday from dialysis with n/v and elevated blood pressure. Pt had a recent fall with rib fractures and has been taking pain medication and is constipated. Pt received dialysis after arriving to the hospital.  This morning she states she feels better. Nausea controlled and is being treated for constipation. Pt lives at home alone, manages her own care. Has transportation to dialysis and using a cane for ambulation. Past Medical History:        Past Medical History:   Diagnosis Date    Arthritis     Chronic kidney disease, stage 5, kidney failure (HCC)     Closed fracture of right shoulder girdle, with routine healing, subsequent encounter     DM (diabetes mellitus) type II controlled with renal manifestation ([facility] [address] 06/1993    Gastroparesis     GERD (gastroesophageal reflux disease)     Hemodialysis patient ([facility] [address]     dialysis on tues, thur, and sat at [facility]    Hypertension     Hypothyroid     Nasal congestion     recent    Noncompliance with medications     Palliative care patient 10/08/2019    Restless leg syndrome        Advance Directives:    Full code  ACP note completed     Plan:   Discharge home. Plan for next treatment May 1.       Offered encouragement and support        Electronically signed by Gogo Vasquez RN on 4/30/2021 at 10:58 AM

## 2021-04-30 NOTE — CARE COORDINATION
Date / Time of Evaluation: 4/30/2021 1:21 PM  Assessment Completed by: Salas Berkowitz    Patient Admission Status: Inpatient [101]    [address]  [address]    [phone] (home)   Telephone Information:   Mobile [phone]       (Best Practice:  Have patient / caregiver verify above address and phone number by stating out loud their current address and reachable phone number.)  Is above information correct? yes      Current PCP:  ROMINA Nemwan  PCP verified? yes    Initial Assessment Completed at bedside with:  patient    Emergency Contacts:  Extended Emergency Contact Information  Primary Emergency Contact: April [address] Phone: [phone]  Mobile Phone: [phone]  Relation: Other  Secondary Emergency Contact: Toña Kee. Home Phone: [phone]  Mobile Phone: [phone]  Relation: Child    Advance Directives: Code Status:  Full Code    Financial:  Payor: Lori Going / Plan: Lori Going  / Product Type: *No Product type* /     Pre-Cert required for SNF:  na    Have Long Term Care Insurance:  na    Pharmacy:   [id number], [address] [phone] - F [phone]  [address] [facility] [address]  Phone: [phone] Fax: [facility], [address] [place] [address] [address]  [address]  [facility] [address]  Phone: [phone] Fax: [phone]      Potential assistance purchasing medications? no    ADLS:  Support System:  Family Members, Friends/Neighbors    Current Home Environment:  Home alone   Steps:  no    Plans to RETURN to current housing: yes  Barriers to RETURNING to current housing:      Currently ACTIVE with Home Health CARE:  no  [facility]:      DME Provider:  Has a cane at bedside- has a wc that she borrowed she can use if needed.     Has a pulse oximetry unit at home: no    Had [facility] prior to admission:  no  Oxygen Company:    Informed of need to bring portable home O2 tank to hospital on day of DISCHARGE:    Name of person committed to bringing portable tank at discharge: Active with HD/PD prior to admission: no  Nephrologist:    HD Center:      Transition Plan:       Transportation PLAN for Discharge:  [facility] has money    Factors facilitating achievement of predicted outcomes:     Barriers to discharge: Additional CM/SW Notes: patient normally lives at home alone, but has a friend Benjamin Narayan that is going to come stay with her for a few days. Has a sister and friends that help her as needed. Pt states she is still able to drive but doesn't have a car, uses public transportation or her sister will take her where she needs to go. Pt denies any needs currently. Has dc orders for today. Narcisa Chisholm and/or her family were provided with choice of provider.         Bryant Baker RN  [facility]  Care Management Department  Ph:  [phone]   Fax:   Electronically signed by Bryant Baker RN on 4/30/2021 at 1:28 PM

## 2021-05-01 VITALS
TEMPERATURE: 98.1 F | BODY MASS INDEX: 19.72 KG/M2 | HEIGHT: 66 IN | DIASTOLIC BLOOD PRESSURE: 69 MMHG | SYSTOLIC BLOOD PRESSURE: 122 MMHG | WEIGHT: 122.7 LBS | HEART RATE: 67 BPM | RESPIRATION RATE: 18 BRPM | OXYGEN SATURATION: 100 %

## 2021-05-01 LAB
ANION GAP SERPL CALCULATED.3IONS-SCNC: 16 MMOL/L (ref 7–19)
BUN BLDV-MCNC: 41 MG/DL (ref 6–20)
CALCIUM SERPL-MCNC: 9.2 MG/DL (ref 8.6–10)
CHLORIDE BLD-SCNC: 87 MMOL/L (ref 98–111)
CO2: 25 MMOL/L (ref 22–29)
CREAT SERPL-MCNC: 5 MG/DL (ref 0.5–0.9)
GFR AFRICAN AMERICAN: 11
GFR NON-AFRICAN AMERICAN: 9
GLUCOSE BLD-MCNC: 201 MG/DL (ref 70–99)
GLUCOSE BLD-MCNC: 223 MG/DL (ref 70–99)
GLUCOSE BLD-MCNC: 284 MG/DL (ref 74–109)
GLUCOSE BLD-MCNC: 289 MG/DL (ref 70–99)
GLUCOSE BLD-MCNC: 312 MG/DL (ref 70–99)
HCT VFR BLD CALC: 32 % (ref 37–47)
HEMOGLOBIN: 9.8 G/DL (ref 12–16)
MCH RBC QN AUTO: 28.9 PG (ref 27–31)
MCHC RBC AUTO-ENTMCNC: 30.6 G/DL (ref 33–37)
MCV RBC AUTO: 94.4 FL (ref 81–99)
PDW BLD-RTO: 18.6 % (ref 11.5–14.5)
PERFORMED ON: ABNORMAL
PLATELET # BLD: 186 K/UL (ref 130–400)
PMV BLD AUTO: 9.5 FL (ref 9.4–12.3)
POTASSIUM REFLEX MAGNESIUM: 4.5 MMOL/L (ref 3.5–5)
RBC # BLD: 3.39 M/UL (ref 4.2–5.4)
SODIUM BLD-SCNC: 128 MMOL/L (ref 136–145)
WBC # BLD: 6.1 K/UL (ref 4.8–10.8)

## 2021-05-01 PROCEDURE — 6360000002 HC RX W HCPCS: Performed by: HOSPITALIST

## 2021-05-01 PROCEDURE — G0378 HOSPITAL OBSERVATION PER HR: HCPCS

## 2021-05-01 PROCEDURE — 82947 ASSAY GLUCOSE BLOOD QUANT: CPT

## 2021-05-01 PROCEDURE — 85027 COMPLETE CBC AUTOMATED: CPT

## 2021-05-01 PROCEDURE — 6370000000 HC RX 637 (ALT 250 FOR IP): Performed by: HOSPITALIST

## 2021-05-01 PROCEDURE — 8010000000 HC HEMODIALYSIS ACUTE INPT

## 2021-05-01 PROCEDURE — 96376 TX/PRO/DX INJ SAME DRUG ADON: CPT

## 2021-05-01 PROCEDURE — 80048 BASIC METABOLIC PNL TOTAL CA: CPT

## 2021-05-01 PROCEDURE — 36415 COLL VENOUS BLD VENIPUNCTURE: CPT

## 2021-05-01 PROCEDURE — 94640 AIRWAY INHALATION TREATMENT: CPT

## 2021-05-01 RX ORDER — 0.9 % SODIUM CHLORIDE 0.9 %
1000 INTRAVENOUS SOLUTION INTRAVENOUS PRN
Status: DISCONTINUED | OUTPATIENT
Start: 2021-05-01 | End: 2021-05-01 | Stop reason: HOSPADM

## 2021-05-01 RX ADMIN — STANDARDIZED SENNA CONCENTRATE 8.6 MG: 8.6 TABLET ORAL at 15:38

## 2021-05-01 RX ADMIN — LISINOPRIL 20 MG: 10 TABLET ORAL at 12:24

## 2021-05-01 RX ADMIN — HEPARIN SODIUM 5000 UNITS: 5000 INJECTION INTRAVENOUS; SUBCUTANEOUS at 06:22

## 2021-05-01 RX ADMIN — IPRATROPIUM BROMIDE AND ALBUTEROL SULFATE 1 AMPULE: .5; 3 SOLUTION RESPIRATORY (INHALATION) at 10:42

## 2021-05-01 RX ADMIN — LEVOTHYROXINE SODIUM 150 MCG: 75 TABLET ORAL at 06:23

## 2021-05-01 RX ADMIN — LINACLOTIDE 290 MCG: 145 CAPSULE, GELATIN COATED ORAL at 06:23

## 2021-05-01 RX ADMIN — HYDROCODONE BITARTRATE AND ACETAMINOPHEN 1 TABLET: 5; 325 TABLET ORAL at 02:27

## 2021-05-01 RX ADMIN — HYDROCODONE BITARTRATE AND ACETAMINOPHEN 1 TABLET: 5; 325 TABLET ORAL at 12:24

## 2021-05-01 RX ADMIN — IPRATROPIUM BROMIDE AND ALBUTEROL SULFATE 1 AMPULE: .5; 3 SOLUTION RESPIRATORY (INHALATION) at 06:30

## 2021-05-01 RX ADMIN — HYDROCHLOROTHIAZIDE 50 MG: 25 TABLET ORAL at 12:24

## 2021-05-01 RX ADMIN — SEVELAMER CARBONATE 800 MG: 800 TABLET, FILM COATED ORAL at 12:24

## 2021-05-01 RX ADMIN — ATORVASTATIN CALCIUM 20 MG: 20 TABLET, FILM COATED ORAL at 12:24

## 2021-05-01 RX ADMIN — HYDRALAZINE HYDROCHLORIDE 75 MG: 50 TABLET, FILM COATED ORAL at 06:22

## 2021-05-01 RX ADMIN — NIFEDIPINE 60 MG: 60 TABLET, EXTENDED RELEASE ORAL at 12:24

## 2021-05-01 RX ADMIN — ISOSORBIDE MONONITRATE 120 MG: 60 TABLET, EXTENDED RELEASE ORAL at 12:24

## 2021-05-01 RX ADMIN — METOPROLOL SUCCINATE 100 MG: 50 TABLET, EXTENDED RELEASE ORAL at 06:22

## 2021-05-01 RX ADMIN — DULOXETINE HYDROCHLORIDE 60 MG: 30 CAPSULE, DELAYED RELEASE ORAL at 12:24

## 2021-05-01 RX ADMIN — ONDANSETRON HYDROCHLORIDE 4 MG: 2 SOLUTION INTRAMUSCULAR; INTRAVENOUS at 06:23

## 2021-05-01 NOTE — PROGRESS NOTES
Nephrology ([facility]) Progress Note    Patient:  Jad Nru  YOB: 1970  Date of Service: 5/1/2021  MRN: [id number]   Acct: [de-identified]   Primary Care Physician: ROMINA Cyr  Advance Directive: Full Code  Admit Date: 4/29/2021       Hospital Day: 2  Referring Provider: Raeanne Severin, MD    Patient Seen, Chart, Consults, Notes, Labs, Radiology studies reviewed. Subjective:     Patient is a 46 yo pleasant woman who has HTN, Dm2, and ESRD. She is on chronic maintenance HD TIW. She recently sustained a fall with rib fractures. She was taking pain meds. She was having leg numbness. She denied head trauma. Since 4/28, she has been having upset stomach with nausea and vomiting. She was sent to ED from her dialysis unit when she looked bad to them. Renal service was consulted to manage her ESRD. She had no fever, no dysuria. She was severely hypertensive on admission (/85). Patient has then done well but prior to discharge she was having hypoglycemia. Patient was kept overnight and today she was seen and examined on dialysis. She was doing better.       Dialysis   Pt was seen on RRT  Modality: Hemodialysis  Access: Arterial Venous Fistula  Location: left upper  QB: 400  QD: 600  UF: 4 Liters    Allergies:  Penicillins, Lamisil advanced [terbinafine], Stadol [butorphanol], and Reglan [metoclopramide]    Medicines:  Current Facility-Administered Medications   Medication Dose Route Frequency Provider Last Rate Last Admin    0.9 % sodium chloride bolus  1,000 mL Intravenous PRN [place] MD        nitroGLYCERIN (NITROSTAT) SL tablet 0.4 mg  0.4 mg Sublingual Q5 Min PRN Maddie Martin MD   0.4 mg at 04/29/21 0811    ipratropium-albuterol (DUONEB) nebulizer solution 1 ampule  1 ampule Inhalation Q4H WA Raeanne Severin, MD   1 ampule at 05/01/21 0630    DULoxetine (CYMBALTA) extended release capsule 60 mg  60 mg Oral Daily Raeanne Severin, MD   60 mg at 04/30/21 0740    hydrALAZINE (APRESOLINE) tablet 75 mg  75 mg Oral 3 times per day Zach Avitia MD   75 mg at 05/01/21 0622    hydroCHLOROthiazide (HYDRODIURIL) tablet 50 mg  50 mg Oral Daily Zach Avitia MD   50 mg at 04/30/21 0740    isosorbide mononitrate (IMDUR) extended release tablet 120 mg  120 mg Oral Daily Zach Avitia MD   120 mg at 04/30/21 0740    levothyroxine (SYNTHROID) tablet 150 mcg  150 mcg Oral Daily Zach Avitia MD   150 mcg at 05/01/21 8321    linaclotide (LINZESS) capsule 290 mcg  290 mcg Oral QAM  Zach Avitia MD   290 mcg at 05/01/21 5613    lisinopril (PRINIVIL;ZESTRIL) tablet 20 mg  20 mg Oral BID Zach Avitia MD   20 mg at 04/30/21 2101    metoprolol succinate (TOPROL XL) extended release tablet 100 mg  100 mg Oral QAM  Zach Avitia MD   100 mg at 05/01/21 0622    NIFEdipine (PROCARDIA XL) extended release tablet 60 mg  60 mg Oral Daily Zach Avitia MD   60 mg at 04/30/21 0740    rOPINIRole (REQUIP) tablet 4 mg  4 mg Oral Nightly Zach Avitia MD   4 mg at 04/30/21 2102    senna (SENOKOT) tablet 8.6 mg  1 tablet Oral Daily Zach Avitia MD   8.6 mg at 04/30/21 0740    sevelamer (RENVELA) tablet 800 mg  800 mg Oral TID  Zach Avitia MD   800 mg at 04/30/21 1729    atorvastatin (LIPITOR) tablet 20 mg  20 mg Oral Daily Zach Avitia MD   20 mg at 04/30/21 0740    heparin (porcine) injection 5,000 Units  5,000 Units Subcutaneous 3 times per day Zach Avitia MD   5,000 Units at 05/01/21 0622    promethazine (PHENERGAN) tablet 12.5 mg  12.5 mg Oral Q6H PRN Zach Avitia MD        Or    ondansetron [facility]) injection 4 mg  4 mg Intravenous Q6H PRN Zach Avitia MD   4 mg at 05/01/21 0999    polyethylene glycol (GLYCOLAX) packet 17 g  17 g Oral Daily PRN Zach Avitia MD        acetaminophen (TYLENOL) tablet 650 mg  650 mg Oral Q6H PRN Zach Avitia MD        Or    acetaminophen (TYLENOL) suppository 650 mg  650 mg Rectal Q6H PRN Carlos Webb MD        magnesium sulfate 2000 mg in 50 mL IVPB premix  2,000 mg Intravenous PRN Carlos Webb MD        nicotine (NICODERM CQ) 21 MG/24HR 1 patch  1 patch Transdermal Daily Carlos Webb MD   1 patch at 04/30/21 0740    lactulose (CHRONULAC) 10 GM/15ML solution 20 g  20 g Oral TID Carlos Webb MD   20 g at 04/30/21 0740    insulin lispro (HUMALOG) injection vial 0-6 Units  0-6 Units Subcutaneous Nightly Carlos Webb MD   1 Units at 04/29/21 2241    glucose (GLUTOSE) 40 % oral gel 15 g  15 g Oral PRN Carlos Webb MD        dextrose 50 % IV solution  12.5 g Intravenous PRN Carlos Webb MD        glucagon (rDNA) injection 1 mg  1 mg Intramuscular PRN Carlos Webb MD        dextrose 5 % solution  100 mL/hr Intravenous PRN Carlos Webb MD   Stopped at 05/01/21 0000    HYDROcodone-acetaminophen (Jose Mc) 5-325 MG per tablet 1 tablet  1 tablet Oral Q6H PRN Carlos Webb MD   1 tablet at 05/01/21 0227       Past Medical History:  Past Medical History:   Diagnosis Date    Arthritis     [facility] kidney disease, stage 5, [facility] failure [address]     Closed fracture of right shoulder girdle, with routine healing, subsequent encounter     DM (diabetes mellitus) type II controlled with renal manifestation ([facility] 06/1993    Gastroparesis     GERD (gastroesophageal reflux disease)     Hemodialysis patient ([facility]     dialysis on tues, thur, and sat at [facility]    Hypertension     Hypothyroid     Nasal congestion     recent    Noncompliance with medications     Palliative care patient 10/08/2019    Restless leg syndrome        Past Surgical History:  Past Surgical History:   Procedure Laterality Date    BREAST SURGERY Left 2008    infected milk gland removed    CHOLECYSTECTOMY, LAPAROSCOPIC      2008    COLONOSCOPY Left 9/17/2020    Dr Elisa Hammonds hepatic flexure-Severe tortuosity with severe spasming, 1 yr recall    DIALYSIS FISTULA CREATION Left 1/25/2019    REVISION LEFT UPPER EXTREMITY AV ACCESS WITH LEFT BRACHIAL ARTERY TO LEFT AXILLARY VEIN WITH  INTERPOSITIONAL ARTEGRAFT   performed by Janee Echeverria MD at [address]  2012    [address] Right 1/25/2019    INSERTION OF RIGHT INTERNAL JUGULAR HEMODIALYSIS CATHETER performed by Janee Echeverria MD at [facility]  08/17/2018    1.  Moderately increased stainable iron identified by special stain in Kupffer cells and occasional hepatocytes. Negative for evidence of significant portal or lobular inflammation. Negative for evidence of significant fibrosis    SC COLONOSCOPY W/BIOPSY SINGLE/MULTIPLE N/A 10/20/2017    Dr Joni Mcgrath prep-Tubular AP (-) dysplasia x 2--3 yr recall    SC EGD TRANSORAL BIOPSY SINGLE/MULTIPLE N/A 12/27/2016    Dr Oropeza Cassette EGD TRANSORAL BIOPSY SINGLE/MULTIPLE N/A 10/20/2017    Dr Emelyn Cortez (-)   [address] [facility]    [facility]  [facility]    [facility]. Left upper fistulograms/venograms, balloon angioplasty cephalic vein arch 65H94 conquest.    VASCULAR SURGERY  08/2018    FISTULOGRAM    VASCULAR SURGERY  10/29/2018    SJS. Left upper fistulograms/venograms    VASCULAR SURGERY  12/19/2018    SJS. Arch aortogram,left upper arteriograms.  VASCULAR SURGERY  03/06/2019    SJS. Removal of tunneled dialyis catheter right internal jugular vein.  VASCULAR SURGERY  08/28/2019    SJS. Left upper extremity fistulogram including venography to the superior vena cava. Balloon angioplasty left subclavian vein with 10mm x 40mm conquest balloon. Balloon angioplasty mid/proximal upper arm cephalic vein with 49CR x 40mm conquest balloon. Venograms after balloon angioplasty. Stent left mid/proximal upper arm cephalic vein stenosis fluency 10mm x 60mm self-expanding covered stent. Balloon     VASCULAR SURGERY  08/28/2019    cont angioplasty stent with 10mm x 40mm conquest balloon. Completion venograms left upper extremity.        Family History  Family History   Problem Relation Age of Onset    Colon Cancer Mother          at 63    Colon Polyps Mother     Lung Cancer Father          at 79    Colon Polyps Father     Stomach Cancer Sister     Breast Cancer Sister 27    Depression Daughter 25        committed suicide    Liver Cancer Neg Hx     Liver Disease Neg Hx     Esophageal Cancer Neg Hx     Rectal Cancer Neg Hx        Social History  Social History     Socioeconomic History    Marital status: Single     Spouse name: Not on file    Number of children: 2    Years of education: Not on file    Highest education level: Not on file   Occupational History    Not on file   Social Needs    Financial resource strain: Not on file    Food insecurity     Worry: Not on file     Inability: Not on file    Transportation needs     Medical: Not on file     Non-medical: Not on file   Tobacco Use    Smoking status: Current Every Day Smoker     Packs/day: 0.50     Years: 33.00     Pack years: 16.50     Types: Cigarettes    Smokeless tobacco: Never Used    Tobacco comment: NOW at 1/4 PD, started at age 24 and has averaged 2 PPD   Substance and Sexual Activity    Alcohol use: No    Drug use: Not Currently     Types: Marijuana    Sexual activity: Not Currently     Partners: Male   Lifestyle    Physical activity     Days per week: Not on file     Minutes per session: Not on file    Stress: Not on file   Relationships    Social connections     Talks on phone: Not on file     Gets together: Not on file     Attends Jainism service: Not on file     Active member of club or organization: Not on file     Attends meetings of clubs or organizations: Not on file     Relationship status: Not on file    Intimate partner violence     Fear of current or ex partner: Not on file     Emotionally abused: Not on file     Physically abused: Not on file     Forced sexual activity: Not on file   Other Topics Concern    Not on file   Social History Narrative    Not on file         Review of Systems:  Reviewed with the patient at the bedside, 6 points reviewed and negative except as noted above. Objective:  BP:196/87   Hr: 72  General:  NAD, A+Ox3, ill-appearing, normal body habitus  Neck:  Supple, normal range of movement  Chest: Bilateral air entry, scattered bilateral rhonchi  CV:  RRR, soft systolic murmur  Abdomen:  NTND, soft, +BS, no hepatosplenomegaly  Extremities:  No peripheral edema  Neurological:  Moving all four extremities  Skin:  No rash, no jaundice      Labs:  BMP:   Recent Labs     04/29/21 0646 04/30/21 0450 05/01/21  0135   * 137 128*   K 4.5 3.7 4.5   CL 81* 94* 87*   CO2 24 28 25   BUN 60* 28* 41*   CREATININE 6.3* 4.3* 5.0*   CALCIUM 10.0 9.6 9.2     CBC:   Recent Labs     04/29/21 0646 04/30/21 0450 05/01/21  0135   WBC 6.1 5.5 6.1   HGB 10.2* 10.3* 9.8*   HCT 33.3* 33.7* 32.0*   MCV 96.2 94.9 94.4    203 186     LIVER PROFILE:   Recent Labs     04/29/21 0646   AST 11   ALT 9   BILITOT 0.4   ALKPHOS 101     PT/INR: No results for input(s): PROTIME, INR in the last 72 hours. APTT: No results for input(s): APTT in the last 72 hours. BNP:  No results for input(s): BNP in the last 72 hours. Ionized Calcium:No results for input(s): IONCA in the last 72 hours. Magnesium:No results for input(s): MG in the last 72 hours. Phosphorus:No results for input(s): PHOS in the last 72 hours. HgbA1C: No results for input(s): LABA1C in the last 72 hours. Hepatic:   Recent Labs     04/29/21 0646   ALKPHOS 101   ALT 9   AST 11   PROT 7.4   BILITOT 0.4   LABALBU 3.6     Lactic Acid: No results for input(s): LACTA in the last 72 hours. Troponin: No results for input(s): CKTOTAL, CKMB, TROPONINT in the last 72 hours. ABGs: No results for input(s): PH, PCO2, PO2, HCO3, O2SAT in the last 72 hours.   CRP:  No results for input(s): CRP in the last 72 hours. Sed Rate:  No results for input(s): SEDRATE in the last 72 hours. Cultures:   No results for input(s): CULTURE in the last 72 hours. No results for input(s): BC, Doug Bennetts in the last 72 hours. No results for input(s): CXSURG in the last 72 hours. Radiology reports as per the Radiologist  Radiology: Xr Knee Left (1-2 Views)    Result Date: 4/29/2021  Examination. XR KNEE LEFT (1-2 VIEWS) 4/29/2021 6:14 AM History: The patient fell and complains of pain in the left knee. Frontal and lateral views of the left knee are compared with the previous study dated 2/24/2021. There is no evidence of recent fracture or dislocation. There is moderate diffuse osteopenia. No focal bony erosion. No soft tissue swelling or a mass. There is moderate diffuse narrowing of the tibiofemoral joint space similar to the previous study. No significant osteophytes. No joint effusion. The severe atheromatous changes of the popliteal and tibial arteries are seen. No acute bony abnormality. A moderate diffuse osteopenia. A mild to moderate chronic osteoarthritis. Signed by Dr Roddy Sahu on 4/29/2021 7:37 AM    Ct Chest W Contrast    Result Date: 4/29/2021  Examination. CT CHEST W CONTRAST 4/29/2021 6:42 AM History: The patient fell and complains of left-sided chest pain. DLP: 205 mGycm. The CT scan of the chest is performed after intravenous contrast enhancement. The images are acquired in axial plane and subsequent reconstruction in coronal and sagittal planes. There is no previous similar study for comparison. The correlation is made with chest radiograph dated 10/24/2020. The lungs are moderately well-expanded. There is an ill-defined groundglass infiltrate in the left lower lobe. There are atelectatic changes in the lingular segment of the left upper lobe.  There is an area of pleural thickening involving the right lower lung laterally, in the lateral part of the inferolateral right major fissure which appears to be present a chronic process/inflammation/scarring. There is another ill-defined density in the right middle lobe posteriorly, adjacent and hit into the right major fissure which also represent a of scarring. No discrete solid complements/nodule. No areas of focal consolidation or infiltrate. No pneumothorax. The limited included soft tissues of the neck. Unremarkable. There is enlargement of the cardiac lobe of the thyroid gland which is extending posteriorly adjacent and to the left of the esophagus. There is an ill-defined nodule in this part of the thyroid gland. There is no axillary lymphadenopathy. Atheromatous changes of the thoracic aorta are seen. No aneurysmal dilatation. A moderate diffuse dilatation of the main pulmonary artery and right and left pulmonary arteries. No filling defect in the opacified pulmonary arterial bed. Atheromatous changes of coronary arteries are seen. There are a few borderline prominent mediastinal lymph nodes. A precarinal lymph node measures 1.1 cm in short axis. A level 4L lymph node measures 1.2 cm in short axis. No abnormal hilar lymph nodes are seen. The limited visualized liver and spleen are unremarkable. The gallbladder is surgically absent. The adrenal glands are unremarkable. The pancreas and kidneys are incompletely visualized and not evaluated. The images reviewed in bone window show deformity of the left second rib anteriorly which is represent an old healed fracture. No acute fracture or acute bony abnormality seen. A prominent Schmorl node is seen in the superior endplate of vertebra L2. An ill-defined groundglass infiltrate in the left lower lung may represent an evolving pneumonia/inflammatory process. Further follow-up is suggested. The scattered areas of pleural thickening and scarring representing a chronic process. Pulmonary arterial hypertension. Right thyroid nodule may be further correlated with sonography. No acute bony abnormality.  Signed by Dr Gavin Ralph Anwer on 4/29/2021 8:00 AM    Ct Abdomen Pelvis W Iv Contrast Additional Contrast? None    Result Date: 4/29/2021  CT ABDOMEN PELVIS W IV CONTRAST 4/29/2021 6:27 AM HISTORY: Left rib pain after fall COMPARISON: None. DLP: 205 mGy cm. Automated exposure control was utilized to diminish patient radiation dose. TECHNIQUE: Following the intravenous administration of contrast, helical CT tomographic images of the abdomen and pelvis were acquired. Coronal reformatted images were also provided for review. FINDINGS: Mild left basilar subsegmental atelectasis is present. Nondisplaced fractures are noted in the left anterolateral sixth, seventh and eighth ribs. No evidence of basilar contusion or pneumothorax. The base of the heart is unremarkable. Chloetta Smimadie LIVER: No focal liver lesion. The hepatic vasculature is patent. BILIARY SYSTEM: The gallbladder has been surgically removed. No evidence of intra or extrahepatic biliary dilatation. Melaniea Nishi PANCREAS: No focal pancreatic lesion. SPLEEN: Splenic granulomas are present. There is no evidence of splenomegaly. Jovita Almodovar KIDNEYS AND ADRENALS: The adrenals are unremarkable. Both kidneys are atrophic in appearance. There is some mild heterogeneous enhancement of the upper pole of the left kidney does raise a differential of pyelonephritis. No perinephric fluid collections are present. There is no evidence of nephrolithiasis. . The ureters are difficult to trace secondary to the lack of intra-abdominal fat but I do not see evidence of obstructive uropathy or discrete ureteral stone. Jovita Almodovar RETROPERITONEUM: No mass, lymphadenopathy or hemorrhage. GI TRACT: No evidence of obstruction or bowel wall thickening. The appendix is not well-visualized. . OTHER: There is no mesenteric mass, lymphadenopathy or fluid collection. The abdominopelvic vasculature is patent. The osseous structures and soft tissues demonstrate no worrisome lesions. No evidence of free fluid or hemoperitoneum.  There is some mild stranding within

## 2021-05-01 NOTE — DISCHARGE SUMMARY
Discharge Summary      Date:5/1/2021        Patient Juan Smith     YOB: 1970     Age:51 y.o. Admit Date:4/29/2021   Admission Condition:fair   Discharged Condition:stable  Discharge Date: 05/01/21       Discharge Diagnoses   Active Problems:    Hypertensive urgency  Resolved Problems:    * No resolved hospital problems. [facility] Stay   Narrative of Hospital Course:     50-year-old lady with a history of end-stage renal disease on dialysis, arthritis, GERD, gastroparesis, restless leg syndrome, noncompliance to medications, presenting to the hospital from dialysis with concerns of emesis. Patient stated she developed constipation after taking pain medications at home and try to use laxative for relief however was having emesis. In the ED was noted to have elevated blood sugar close to 700, with sodium levels of 123, troponin elevation of 0.2. CT chest showing nondisplaced fractures of the left sixth seventh and eighth ribs. Blood pressure was elevated greater than 203 systolic, requiring initiation of nitro drip. Patient was quickly weaned off nitro drip, seen by nephrology and dialysis section was provided on day of admission. Patient blood pressure now back to normal range, patient denied any further emesis or constipation. She is requesting discharge home today she is back to her baseline, however was symptomatic from hypoglycemia after insulin administration. Patient was monitored in-house requiring D5 drip, blood sugar now improved. Was given regular runs of dialysis in-house on Saturday prior to discharge from the hospital. Encouraged to follow-up outpatient with PCP as well as nephrology for continuous management of her chronic medical problems. Also emphasized importance of compliance with medical regimen. Physical Examination:  General: Well-developed, no acute distress lying comfortably in bed.   HEENT: Atraumatic normocephalic, range of motion normal, no JVD  Cardiac: Normal A7-T7 with mild systolic murmur  Respiratory: clear To auscultation bilaterally, no rhonchi or rales, no wheezing  Abdomen: Soft, positive bowel sounds in all quadrants, no distention, nontender to palpation, no organomegaly noted, rebound noted. Extremities: no tenderness, no edema, moves all extremities  Psych: Affect normal and good eye contact, behavioral normal.          Consultants:   IP CONSULT TO NEPHROLOGY  IP CONSULT TO NEPHROLOGY  PALLIATIVE CARE EVAL    Time Spent on Discharge:  40 minutes were spent in patient examination, evaluation, counseling as well as medication reconciliation, prescriptions for required medications, discharge plan and follow up. Surgeries/Procedures Performed:  NONE      Significant Diagnostic Studies:   Recent Labs:  CBC:   Lab Results   Component Value Date    WBC 6.1 05/01/2021    RBC 3.39 05/01/2021    HGB 9.8 05/01/2021    HCT 32.0 05/01/2021    MCV 94.4 05/01/2021    MCH 28.9 05/01/2021    MCHC 30.6 05/01/2021    RDW 18.6 05/01/2021     05/01/2021     BMP:    Lab Results   Component Value Date    GLUCOSE 284 05/01/2021     05/01/2021    K 4.5 05/01/2021    CL 87 05/01/2021    CO2 25 05/01/2021    ANIONGAP 16 05/01/2021    BUN 41 05/01/2021    CREATININE 5.0 05/01/2021    CALCIUM 9.2 05/01/2021    LABGLOM 9 05/01/2021    GFRAA 11 05/01/2021       Radiology Last 7 Days:  Xr Knee Left (1-2 Views)    Result Date: 4/29/2021  No acute bony abnormality. A moderate diffuse osteopenia. A mild to moderate chronic osteoarthritis. Signed by Dr Ed Gleason on 4/29/2021 7:37 AM    Ct Chest W Contrast    Result Date: 4/29/2021  An ill-defined groundglass infiltrate in the left lower lung may represent an evolving pneumonia/inflammatory process. Further follow-up is suggested. The scattered areas of pleural thickening and scarring representing a chronic process. Pulmonary arterial hypertension.  Right thyroid nodule may be further correlated with sonography. No acute bony abnormality. Signed by Dr Ling Jarquin on 4/29/2021 8:00 AM    Ct Abdomen Pelvis W Iv Contrast Additional Contrast? None    Result Date: 4/29/2021  1. Nondisplaced fractures are noted of the left anterolateral sixth, seventh and eighth ribs. Mild left basilar atelectasis is present. 2. Both kidneys are atrophic in appearance. There is some heterogeneous enhancement involving the mid and upper pole of the left kidney perhaps representing pyelonephritis. No perinephric fluid collections are present. No evidence of obstructive uropathy. 3. No evidence of soft tissue organ injury or hemoperitoneum. There is a normal bowel gas pattern. 4. Mild stranding noted rather diffusely of the subcutaneous fat within both the abdomen and pelvis and lower chest. This would suggest an element of anasarca/body wall edema. . Signed by Dr Saint Mays on 4/29/2021 8:22 AM    Xr Hip 2-3 Vw W Pelvis Left    Result Date: 4/29/2021  1. Osteopenia with no acute osseous pathology identified.  Signed by Dr Pj Nickerson on 4/29/2021 7:34 AM      Discharge Plan   Disposition: Home    Provider Follow-Up:   ROMINA Cohen  [address]  [phone]    Go on 5/7/2021  Hospital follow-up on 5/7/21 @ 10:30am.          Patient Instructions   Diet: diabetic diet and renal diet    Activity: activity as tolerated      Discharge Medications         Medication List      START taking these medications    lactulose 10 GM/15ML solution  Commonly known as: CHRONULAC  Take 30 mLs by mouth 3 times daily        CONTINUE taking these medications    albuterol sulfate  (90 Base) MCG/ACT inhaler     cloNIDine 0.3 MG tablet  Commonly known as: CATAPRES  Take 1 tablet by mouth every 8 hours     DULoxetine 60 MG extended release capsule  Commonly known as: CYMBALTA     hydrALAZINE 50 MG tablet  Commonly known as: APRESOLINE  Take 1.5 tablets by mouth every 8 hours     hydroCHLOROthiazide 25 MG tablet  Commonly known as: HYDRODIURIL  Take 2 tablets by mouth daily     isosorbide mononitrate 120 MG extended release tablet  Commonly known as: IMDUR  Take 1 tablet by mouth daily     Lantus SoloStar 100 UNIT/ML injection pen  Generic drug: insulin glargine  Inject 40 Units into the skin daily     levothyroxine 150 MCG tablet  Commonly known as: SYNTHROID     linaclotide 290 MCG Caps capsule  Commonly known as: LINZESS  Take 1 capsule by mouth every morning (before breakfast)     lisinopril 20 MG tablet  Commonly known as: PRINIVIL;ZESTRIL  Take 1 tablet by mouth 2 times daily     metoprolol succinate 100 MG extended release tablet  Commonly known as: TOPROL XL  Take 1 tablet by mouth every morning (before breakfast)     NIFEdipine 60 MG extended release tablet  Commonly known as: ADALAT CC     NovoLOG FlexPen 100 UNIT/ML injection pen  Generic drug: insulin aspart     rOPINIRole 2 MG tablet  Commonly known as: REQUIP     Senna-Lax 8.6 MG tablet  Generic drug: senna     sevelamer 800 MG tablet  Commonly known as: RENVELA     simvastatin 40 MG tablet  Commonly known as: ZOCOR     Vitamin D2 10 MCG (400 UNIT) Tabs        STOP taking these medications    levETIRAcetam 500 MG tablet  Commonly known as: KEPPRA     nicotine 14 MG/24HR  Commonly known as: NICODERM CQ     ondansetron 4 MG disintegrating tablet  Commonly known as: ZOFRAN-ODT           Where to Get Your Medications      These medications were sent to [id number], [phone] [facility] [place]  [id number] [facility], [id number] [place] [address]    Phone: [phone]   · lactulose 10 GM/15ML solution         Electronically signed by Dominique Ware MD on 4/30/21 at 12:02 PM CDT

## 2021-05-02 LAB
EKG P AXIS: 57 DEGREES
EKG P AXIS: 68 DEGREES
EKG P-R INTERVAL: 194 MS
EKG P-R INTERVAL: 196 MS
EKG Q-T INTERVAL: 360 MS
EKG Q-T INTERVAL: 390 MS
EKG QRS DURATION: 102 MS
EKG QRS DURATION: 104 MS
EKG QTC CALCULATION (BAZETT): 428 MS
EKG QTC CALCULATION (BAZETT): 435 MS
EKG T AXIS: 52 DEGREES
EKG T AXIS: 76 DEGREES

## 2021-05-02 PROCEDURE — 93010 ELECTROCARDIOGRAM REPORT: CPT | Performed by: INTERNAL MEDICINE

## 2021-05-13 ENCOUNTER — TELEPHONE (OUTPATIENT)
Dept: SURGERY | Age: 51
End: 2021-05-13

## 2021-05-13 ENCOUNTER — HOSPITAL ENCOUNTER (OUTPATIENT)
Dept: PREADMISSION TESTING | Age: 51
Discharge: HOME | End: 2021-05-17
Payer: MEDICARE

## 2021-05-13 LAB
ANION GAP SERPL CALCULATED.3IONS-SCNC: 14 MMOL/L (ref 7–19)
BASOPHILS ABSOLUTE: 0.1 K/UL (ref 0–0.2)
BASOPHILS RELATIVE PERCENT: 1.6 % (ref 0–1)
BUN BLDV-MCNC: 6 MG/DL (ref 6–20)
CALCIUM SERPL-MCNC: 9.7 MG/DL (ref 8.6–10)
CHLORIDE BLD-SCNC: 91 MMOL/L (ref 98–111)
CO2: 26 MMOL/L (ref 22–29)
CREAT SERPL-MCNC: 2.1 MG/DL (ref 0.5–0.9)
EOSINOPHILS ABSOLUTE: 0.1 K/UL (ref 0–0.6)
EOSINOPHILS RELATIVE PERCENT: 1.9 % (ref 0–5)
GFR AFRICAN AMERICAN: 30
GFR NON-AFRICAN AMERICAN: 25
GLUCOSE BLD-MCNC: 432 MG/DL (ref 74–109)
HCT VFR BLD CALC: 42.4 % (ref 37–47)
HEMOGLOBIN: 13.3 G/DL (ref 12–16)
IMMATURE GRANULOCYTES #: 0 K/UL
LYMPHOCYTES ABSOLUTE: 0.7 K/UL (ref 1.1–4.5)
LYMPHOCYTES RELATIVE PERCENT: 11.2 % (ref 20–40)
MCH RBC QN AUTO: 28.6 PG (ref 27–31)
MCHC RBC AUTO-ENTMCNC: 31.4 G/DL (ref 33–37)
MCV RBC AUTO: 91.2 FL (ref 81–99)
MONOCYTES ABSOLUTE: 0.4 K/UL (ref 0–0.9)
MONOCYTES RELATIVE PERCENT: 6.1 % (ref 0–10)
NEUTROPHILS ABSOLUTE: 4.9 K/UL (ref 1.5–7.5)
NEUTROPHILS RELATIVE PERCENT: 78.9 % (ref 50–65)
PDW BLD-RTO: 17.5 % (ref 11.5–14.5)
PLATELET # BLD: 194 K/UL (ref 130–400)
PMV BLD AUTO: 10.1 FL (ref 9.4–12.3)
POTASSIUM SERPL-SCNC: 2.2 MMOL/L (ref 3.5–5)
RBC # BLD: 4.65 M/UL (ref 4.2–5.4)
SARS-COV-2, NAAT: NOT DETECTED
SODIUM BLD-SCNC: 131 MMOL/L (ref 136–145)
WBC # BLD: 6.3 K/UL (ref 4.8–10.8)

## 2021-05-13 PROCEDURE — 87635 SARS-COV-2 COVID-19 AMP PRB: CPT

## 2021-05-13 PROCEDURE — 80048 BASIC METABOLIC PNL TOTAL CA: CPT

## 2021-05-13 PROCEDURE — 85025 COMPLETE CBC W/AUTO DIFF WBC: CPT

## 2021-05-13 RX ORDER — GABAPENTIN 300 MG/1
300 CAPSULE ORAL ONCE
Status: CANCELLED | OUTPATIENT
Start: 2021-05-14

## 2021-05-13 RX ORDER — CELECOXIB 200 MG/1
200 CAPSULE ORAL ONCE
Status: CANCELLED | OUTPATIENT
Start: 2021-05-14

## 2021-05-13 RX ORDER — ACETAMINOPHEN 325 MG/1
975 TABLET ORAL ONCE
Status: CANCELLED | OUTPATIENT
Start: 2021-05-14

## 2021-05-13 NOTE — TELEPHONE ENCOUNTER
Lab called and patient's potassium level is critical at 2.2. Dr. Ebony Frazier spoke with anesthesia and the decision was made to cancel surgery. Spoke with pt and let her know. [facility] and they told me that they do not handle that and directed me to call her dialysis center. [facility] and told them the lab was done after her treatment this morning. They will page the NP on call and get in touch with patient for recommendations. Per Dr. Ebony Frazier please reschedule her surgery for 1-2 weeks from now.

## 2021-05-14 ENCOUNTER — ANESTHESIA (OUTPATIENT)
Dept: OPERATING ROOM | Age: 51
End: 2021-05-14
Payer: MEDICARE

## 2021-05-14 ENCOUNTER — ANESTHESIA EVENT (OUTPATIENT)
Dept: OPERATING ROOM | Age: 51
End: 2021-05-14
Payer: MEDICARE

## 2021-05-15 ENCOUNTER — HOSPITAL ENCOUNTER (INPATIENT)
Age: 51
LOS: 1 days | Discharge: HOME | DRG: 637 | End: 2021-05-16
Attending: EMERGENCY MEDICINE | Admitting: STUDENT IN AN ORGANIZED HEALTH CARE EDUCATION/TRAINING PROGRAM
Payer: MEDICARE

## 2021-05-15 DIAGNOSIS — Z79.4 TYPE 2 DIABETES MELLITUS WITH HYPERGLYCEMIA, WITH LONG-TERM CURRENT USE OF INSULIN (HCC): Primary | ICD-10-CM

## 2021-05-15 DIAGNOSIS — N18.6 ESRD ON HEMODIALYSIS (HCC): ICD-10-CM

## 2021-05-15 DIAGNOSIS — B37.0 THRUSH, ORAL: ICD-10-CM

## 2021-05-15 DIAGNOSIS — Z99.2 ESRD ON HEMODIALYSIS (HCC): ICD-10-CM

## 2021-05-15 DIAGNOSIS — E11.65 TYPE 2 DIABETES MELLITUS WITH HYPERGLYCEMIA, WITH LONG-TERM CURRENT USE OF INSULIN (HCC): Primary | ICD-10-CM

## 2021-05-15 LAB
ALBUMIN SERPL-MCNC: 4.3 G/DL (ref 3.5–5.2)
ALP BLD-CCNC: 134 U/L (ref 35–104)
ALT SERPL-CCNC: 9 U/L (ref 5–33)
ANION GAP SERPL CALCULATED.3IONS-SCNC: 12 MMOL/L (ref 7–19)
ANION GAP SERPL CALCULATED.3IONS-SCNC: 15 MMOL/L (ref 7–19)
AST SERPL-CCNC: 13 U/L (ref 5–32)
BASOPHILS ABSOLUTE: 0.1 K/UL (ref 0–0.2)
BASOPHILS RELATIVE PERCENT: 1 % (ref 0–1)
BILIRUB SERPL-MCNC: 0.4 MG/DL (ref 0.2–1.2)
BUN BLDV-MCNC: 28 MG/DL (ref 6–20)
BUN BLDV-MCNC: 7 MG/DL (ref 6–20)
CALCIUM SERPL-MCNC: 10.1 MG/DL (ref 8.6–10)
CALCIUM SERPL-MCNC: 9.1 MG/DL (ref 8.6–10)
CHLORIDE BLD-SCNC: 77 MMOL/L (ref 98–111)
CHLORIDE BLD-SCNC: 93 MMOL/L (ref 98–111)
CO2: 21 MMOL/L (ref 22–29)
CO2: 26 MMOL/L (ref 22–29)
CREAT SERPL-MCNC: 1.9 MG/DL (ref 0.5–0.9)
CREAT SERPL-MCNC: 4.6 MG/DL (ref 0.5–0.9)
EOSINOPHILS ABSOLUTE: 0.1 K/UL (ref 0–0.6)
EOSINOPHILS RELATIVE PERCENT: 0.8 % (ref 0–5)
GFR AFRICAN AMERICAN: 12
GFR AFRICAN AMERICAN: 34
GFR NON-AFRICAN AMERICAN: 10
GFR NON-AFRICAN AMERICAN: 28
GLUCOSE BLD-MCNC: 1038 MG/DL (ref 74–109)
GLUCOSE BLD-MCNC: 280 MG/DL (ref 74–109)
GLUCOSE BLD-MCNC: 362 MG/DL (ref 70–99)
GLUCOSE BLD-MCNC: NORMAL MG/DL
HCT VFR BLD CALC: 38.2 % (ref 37–47)
HEMOGLOBIN: 12.2 G/DL (ref 12–16)
IMMATURE GRANULOCYTES #: 0 K/UL
LYMPHOCYTES ABSOLUTE: 0.8 K/UL (ref 1.1–4.5)
LYMPHOCYTES RELATIVE PERCENT: 8.6 % (ref 20–40)
MCH RBC QN AUTO: 29.4 PG (ref 27–31)
MCHC RBC AUTO-ENTMCNC: 31.9 G/DL (ref 33–37)
MCV RBC AUTO: 92 FL (ref 81–99)
MONOCYTES ABSOLUTE: 0.7 K/UL (ref 0–0.9)
MONOCYTES RELATIVE PERCENT: 8.1 % (ref 0–10)
NEUTROPHILS ABSOLUTE: 7.1 K/UL (ref 1.5–7.5)
NEUTROPHILS RELATIVE PERCENT: 81 % (ref 50–65)
PDW BLD-RTO: 17.5 % (ref 11.5–14.5)
PERFORMED ON: ABNORMAL
PLATELET # BLD: 148 K/UL (ref 130–400)
PMV BLD AUTO: 9.8 FL (ref 9.4–12.3)
POTASSIUM SERPL-SCNC: 3.2 MMOL/L (ref 3.5–5)
POTASSIUM SERPL-SCNC: 4.3 MMOL/L (ref 3.5–5)
RBC # BLD: 4.15 M/UL (ref 4.2–5.4)
SARS-COV-2, NAAT: NOT DETECTED
SODIUM BLD-SCNC: 113 MMOL/L (ref 136–145)
SODIUM BLD-SCNC: 131 MMOL/L (ref 136–145)
TOTAL PROTEIN: 7.9 G/DL (ref 6.6–8.7)
WBC # BLD: 8.8 K/UL (ref 4.8–10.8)

## 2021-05-15 PROCEDURE — 82947 ASSAY GLUCOSE BLOOD QUANT: CPT

## 2021-05-15 PROCEDURE — G0378 HOSPITAL OBSERVATION PER HR: HCPCS

## 2021-05-15 PROCEDURE — 6360000002 HC RX W HCPCS: Performed by: STUDENT IN AN ORGANIZED HEALTH CARE EDUCATION/TRAINING PROGRAM

## 2021-05-15 PROCEDURE — 6370000000 HC RX 637 (ALT 250 FOR IP): Performed by: STUDENT IN AN ORGANIZED HEALTH CARE EDUCATION/TRAINING PROGRAM

## 2021-05-15 PROCEDURE — 36415 COLL VENOUS BLD VENIPUNCTURE: CPT

## 2021-05-15 PROCEDURE — 5A1D70Z PERFORMANCE OF URINARY FILTRATION, INTERMITTENT, LESS THAN 6 HOURS PER DAY: ICD-10-PCS | Performed by: INTERNAL MEDICINE

## 2021-05-15 PROCEDURE — 99284 EMERGENCY DEPT VISIT MOD MDM: CPT

## 2021-05-15 PROCEDURE — 96372 THER/PROPH/DIAG INJ SC/IM: CPT

## 2021-05-15 PROCEDURE — 87635 SARS-COV-2 COVID-19 AMP PRB: CPT

## 2021-05-15 PROCEDURE — 2580000003 HC RX 258: Performed by: STUDENT IN AN ORGANIZED HEALTH CARE EDUCATION/TRAINING PROGRAM

## 2021-05-15 PROCEDURE — 80053 COMPREHEN METABOLIC PANEL: CPT

## 2021-05-15 PROCEDURE — 85025 COMPLETE CBC W/AUTO DIFF WBC: CPT

## 2021-05-15 PROCEDURE — 6370000000 HC RX 637 (ALT 250 FOR IP): Performed by: EMERGENCY MEDICINE

## 2021-05-15 PROCEDURE — 1210000000 HC MED SURG R&B

## 2021-05-15 PROCEDURE — 8010000000 HC HEMODIALYSIS ACUTE INPT

## 2021-05-15 RX ORDER — DULOXETIN HYDROCHLORIDE 60 MG/1
60 CAPSULE, DELAYED RELEASE ORAL DAILY
Status: DISCONTINUED | OUTPATIENT
Start: 2021-05-16 | End: 2021-05-16 | Stop reason: HOSPADM

## 2021-05-15 RX ORDER — IPRATROPIUM BROMIDE AND ALBUTEROL SULFATE 2.5; .5 MG/3ML; MG/3ML
1 SOLUTION RESPIRATORY (INHALATION) EVERY 4 HOURS PRN
Status: DISCONTINUED | OUTPATIENT
Start: 2021-05-15 | End: 2021-05-16 | Stop reason: HOSPADM

## 2021-05-15 RX ORDER — ACETAMINOPHEN 325 MG/1
650 TABLET ORAL EVERY 6 HOURS PRN
Status: DISCONTINUED | OUTPATIENT
Start: 2021-05-15 | End: 2021-05-16 | Stop reason: HOSPADM

## 2021-05-15 RX ORDER — ERGOCALCIFEROL 1.25 MG/1
50000 CAPSULE ORAL
Status: DISCONTINUED | OUTPATIENT
Start: 2021-05-15 | End: 2021-05-16 | Stop reason: HOSPADM

## 2021-05-15 RX ORDER — SEVELAMER CARBONATE 800 MG/1
800 TABLET, FILM COATED ORAL
Status: DISCONTINUED | OUTPATIENT
Start: 2021-05-16 | End: 2021-05-16 | Stop reason: HOSPADM

## 2021-05-15 RX ORDER — ROPINIROLE 4 MG/1
4 TABLET, FILM COATED ORAL NIGHTLY
Status: DISCONTINUED | OUTPATIENT
Start: 2021-05-15 | End: 2021-05-16 | Stop reason: HOSPADM

## 2021-05-15 RX ORDER — SODIUM CHLORIDE 0.9 % (FLUSH) 0.9 %
5-40 SYRINGE (ML) INJECTION EVERY 12 HOURS SCHEDULED
Status: DISCONTINUED | OUTPATIENT
Start: 2021-05-15 | End: 2021-05-16 | Stop reason: HOSPADM

## 2021-05-15 RX ORDER — ISOSORBIDE MONONITRATE 60 MG/1
120 TABLET, EXTENDED RELEASE ORAL DAILY
Status: DISCONTINUED | OUTPATIENT
Start: 2021-05-16 | End: 2021-05-16 | Stop reason: HOSPADM

## 2021-05-15 RX ORDER — SENNA PLUS 8.6 MG/1
1 TABLET ORAL DAILY
Status: DISCONTINUED | OUTPATIENT
Start: 2021-05-16 | End: 2021-05-16 | Stop reason: HOSPADM

## 2021-05-15 RX ORDER — SODIUM CHLORIDE 0.9 % (FLUSH) 0.9 %
5-40 SYRINGE (ML) INJECTION PRN
Status: DISCONTINUED | OUTPATIENT
Start: 2021-05-15 | End: 2021-05-16 | Stop reason: HOSPADM

## 2021-05-15 RX ORDER — HEPARIN SODIUM 5000 [USP'U]/ML
5000 INJECTION, SOLUTION INTRAVENOUS; SUBCUTANEOUS EVERY 8 HOURS SCHEDULED
Status: DISCONTINUED | OUTPATIENT
Start: 2021-05-15 | End: 2021-05-16 | Stop reason: HOSPADM

## 2021-05-15 RX ORDER — LEVOTHYROXINE SODIUM 0.05 MG/1
150 TABLET ORAL DAILY
Status: DISCONTINUED | OUTPATIENT
Start: 2021-05-16 | End: 2021-05-16 | Stop reason: HOSPADM

## 2021-05-15 RX ORDER — INSULIN GLARGINE 100 [IU]/ML
20 INJECTION, SOLUTION SUBCUTANEOUS 2 TIMES DAILY
Status: DISCONTINUED | OUTPATIENT
Start: 2021-05-15 | End: 2021-05-16

## 2021-05-15 RX ORDER — ATORVASTATIN CALCIUM 20 MG/1
20 TABLET, FILM COATED ORAL DAILY
Status: DISCONTINUED | OUTPATIENT
Start: 2021-05-16 | End: 2021-05-16 | Stop reason: HOSPADM

## 2021-05-15 RX ORDER — POLYETHYLENE GLYCOL 3350 17 G/17G
17 POWDER, FOR SOLUTION ORAL DAILY PRN
Status: DISCONTINUED | OUTPATIENT
Start: 2021-05-15 | End: 2021-05-16 | Stop reason: HOSPADM

## 2021-05-15 RX ORDER — INSULIN GLARGINE 100 [IU]/ML
20 INJECTION, SOLUTION SUBCUTANEOUS ONCE
Status: COMPLETED | OUTPATIENT
Start: 2021-05-15 | End: 2021-05-15

## 2021-05-15 RX ORDER — LISINOPRIL 20 MG/1
20 TABLET ORAL 2 TIMES DAILY
Status: DISCONTINUED | OUTPATIENT
Start: 2021-05-15 | End: 2021-05-16 | Stop reason: HOSPADM

## 2021-05-15 RX ORDER — ONDANSETRON 2 MG/ML
4 INJECTION INTRAMUSCULAR; INTRAVENOUS EVERY 6 HOURS PRN
Status: DISCONTINUED | OUTPATIENT
Start: 2021-05-15 | End: 2021-05-16 | Stop reason: HOSPADM

## 2021-05-15 RX ORDER — PROMETHAZINE HYDROCHLORIDE 12.5 MG/1
12.5 TABLET ORAL EVERY 6 HOURS PRN
Status: DISCONTINUED | OUTPATIENT
Start: 2021-05-15 | End: 2021-05-16 | Stop reason: HOSPADM

## 2021-05-15 RX ORDER — HYDRALAZINE HYDROCHLORIDE 25 MG/1
75 TABLET, FILM COATED ORAL EVERY 8 HOURS SCHEDULED
Status: DISCONTINUED | OUTPATIENT
Start: 2021-05-15 | End: 2021-05-16 | Stop reason: HOSPADM

## 2021-05-15 RX ORDER — LACTULOSE 10 G/15ML
20 SOLUTION ORAL 3 TIMES DAILY
Status: DISCONTINUED | OUTPATIENT
Start: 2021-05-15 | End: 2021-05-16 | Stop reason: HOSPADM

## 2021-05-15 RX ORDER — HYDROCHLOROTHIAZIDE 25 MG/1
50 TABLET ORAL DAILY
Status: DISCONTINUED | OUTPATIENT
Start: 2021-05-16 | End: 2021-05-16 | Stop reason: HOSPADM

## 2021-05-15 RX ORDER — ACETAMINOPHEN 650 MG/1
650 SUPPOSITORY RECTAL EVERY 6 HOURS PRN
Status: DISCONTINUED | OUTPATIENT
Start: 2021-05-15 | End: 2021-05-16 | Stop reason: HOSPADM

## 2021-05-15 RX ORDER — SODIUM CHLORIDE 9 MG/ML
25 INJECTION, SOLUTION INTRAVENOUS PRN
Status: DISCONTINUED | OUTPATIENT
Start: 2021-05-15 | End: 2021-05-16 | Stop reason: HOSPADM

## 2021-05-15 RX ORDER — NIFEDIPINE 60 MG/1
60 TABLET, EXTENDED RELEASE ORAL DAILY
Status: DISCONTINUED | OUTPATIENT
Start: 2021-05-16 | End: 2021-05-16 | Stop reason: HOSPADM

## 2021-05-15 RX ORDER — METOPROLOL SUCCINATE 50 MG/1
100 TABLET, EXTENDED RELEASE ORAL
Status: DISCONTINUED | OUTPATIENT
Start: 2021-05-16 | End: 2021-05-16 | Stop reason: HOSPADM

## 2021-05-15 RX ADMIN — NYSTATIN 500000 UNITS: 100000 SUSPENSION ORAL at 21:59

## 2021-05-15 RX ADMIN — ACETAMINOPHEN 650 MG: 325 TABLET ORAL at 22:29

## 2021-05-15 RX ADMIN — ERGOCALCIFEROL 50000 UNITS: 1.25 CAPSULE ORAL at 22:34

## 2021-05-15 RX ADMIN — CLONIDINE HYDROCHLORIDE 0.3 MG: 0.2 TABLET ORAL at 22:32

## 2021-05-15 RX ADMIN — SODIUM CHLORIDE, PRESERVATIVE FREE 10 ML: 5 INJECTION INTRAVENOUS at 22:32

## 2021-05-15 RX ADMIN — NYSTATIN 500000 UNITS: 100000 SUSPENSION ORAL at 08:06

## 2021-05-15 RX ADMIN — HYDRALAZINE HYDROCHLORIDE 75 MG: 25 TABLET, FILM COATED ORAL at 22:30

## 2021-05-15 RX ADMIN — NYSTATIN 500000 UNITS: 100000 SUSPENSION ORAL at 16:28

## 2021-05-15 RX ADMIN — INSULIN GLARGINE 20 UNITS: 100 INJECTION, SOLUTION SUBCUTANEOUS at 11:11

## 2021-05-15 RX ADMIN — INSULIN HUMAN 10 UNITS: 100 INJECTION, SOLUTION PARENTERAL at 08:44

## 2021-05-15 RX ADMIN — ROPINIROLE 4 MG: 4 TABLET ORAL at 22:35

## 2021-05-15 RX ADMIN — INSULIN LISPRO 6 UNITS: 100 INJECTION, SOLUTION INTRAVENOUS; SUBCUTANEOUS at 20:30

## 2021-05-15 RX ADMIN — LACTULOSE 20 G: 10 SOLUTION ORAL at 22:35

## 2021-05-15 RX ADMIN — LISINOPRIL 20 MG: 20 TABLET ORAL at 22:35

## 2021-05-15 RX ADMIN — INSULIN LISPRO 10 UNITS: 100 INJECTION, SOLUTION INTRAVENOUS; SUBCUTANEOUS at 16:28

## 2021-05-15 RX ADMIN — PROMETHAZINE HYDROCHLORIDE 12.5 MG: 12.5 TABLET ORAL at 22:30

## 2021-05-15 RX ADMIN — HEPARIN SODIUM 5000 UNITS: 5000 INJECTION INTRAVENOUS; SUBCUTANEOUS at 16:36

## 2021-05-15 RX ADMIN — HEPARIN SODIUM 5000 UNITS: 5000 INJECTION INTRAVENOUS; SUBCUTANEOUS at 21:59

## 2021-05-15 ASSESSMENT — ENCOUNTER SYMPTOMS
SHORTNESS OF BREATH: 0
ABDOMINAL PAIN: 0
VOMITING: 0
SORE THROAT: 1

## 2021-05-15 ASSESSMENT — PAIN SCALES - GENERAL
PAINLEVEL_OUTOF10: 8
PAINLEVEL_OUTOF10: 8

## 2021-05-15 NOTE — H&P
Hypoglycemia: History and Physical    Date: 5/15/2021 Time: 9:33 AM    Name: Barbra Kaiser : 1970 MRN: [id number]    Code Status: Full Code No additional code details    PCP: ROMINA Bryan    Patient's Chief Complaint Is:    Malaise, oral lesions    HPI: Patient is a 45 y.o. female with ESRD on dialysis Tuesday/Thursday/Saturday, hypertension, diabetes mellitus, gastroparesis, GERD, hypothyroidism, history of medical noncompliance, recently hospitalized  after presenting with emesis with hypertensive urgency, CT chest showed nondisplaced fractures of the left sixth and seventh/eighth ribs, required nitro drip, also developed hypoglycemia following insulin administration requiring D5 drip in setting of poor oral intake, eventually discharged on . Today, she presented to the ED with complaint of oral lesions consistent with thrush that started over the preceding 2 days. She has also had generalized malaise and was sent to the ED after presenting to dialysis clinic this morning due to feeling ill. She did not undergo dialysis. Further work-up in the ED revealed significant hyperglycemia with blood glucose over 1000 with hyponatremia--corrected sodium is approximately 128. Patient with serum bicarb 21, no evidence of significant acidosis or increased anion gap. Patient without any significant mental status changes. No evidence of DKA or HHS.           Past Medical History:   Diagnosis Date    Arthritis     Chronic kidney disease, stage 5, kidney failure (HCC)     Closed fracture of right shoulder girdle, with routine healing, subsequent encounter     DM (diabetes mellitus) type II controlled with renal manifestation ([facility] 1993    Gastroparesis     GERD (gastroesophageal reflux disease)     Hemodialysis patient ([facility]     dialysis on , and sat at [facility] clinic    Hypertension     Hypothyroid     Nasal congestion     recent    Noncompliance with medications     Palliative care patient 10/08/2019    Restless leg syndrome      Past Surgical History:   Procedure Laterality Date    BREAST SURGERY Left 2008    infected milk gland removed    CHOLECYSTECTOMY, LAPAROSCOPIC      2008    COLONOSCOPY Left 9/17/2020    Dr Ingrid Mg hepatic flexure-Severe tortuosity with severe spasming, 1 yr recall    DIALYSIS FISTULA CREATION Left 1/25/2019    REVISION LEFT UPPER EXTREMITY AV ACCESS WITH LEFT BRACHIAL ARTERY TO LEFT AXILLARY VEIN WITH  INTERPOSITIONAL ARTEGRAFT   performed by Isaac Herrera MD at [address]  2012    [address] Right 1/25/2019    INSERTION OF RIGHT INTERNAL JUGULAR HEMODIALYSIS CATHETER performed by Isaac Herrera MD at [address] [facility]  08/17/2018    1.  Moderately increased stainable iron identified by special stain in Kupffer cells and occasional hepatocytes. Negative for evidence of significant portal or lobular inflammation. Negative for evidence of significant fibrosis    RI COLONOSCOPY W/BIOPSY SINGLE/MULTIPLE N/A 10/20/2017    Dr Davian Frederick prep-Tubular AP (-) dysplasia x 2--3 yr recall    RI EGD TRANSORAL BIOPSY SINGLE/MULTIPLE N/A 12/27/2016    Dr Ashtyn Victoria EGD TRANSORAL BIOPSY SINGLE/MULTIPLE N/A 10/20/2017    Dr Maribel Chacon (-)   [address] [facility] VASCULAR SURGERY Left 2016    fistula by Dr Edilma Tian at [address]  10/02/2017    [facility]. Left upper fistulograms/venograms, balloon angioplasty cephalic vein arch 05E68 conquest.    VASCULAR SURGERY  08/2018    FISTULOGRAM    VASCULAR SURGERY  10/29/2018    [facility]. Left upper fistulograms/venograms    VASCULAR SURGERY  12/19/2018    [facility]. Arch aortogram,left upper arteriograms.  VASCULAR SURGERY  03/06/2019    [facility]. Removal of tunneled dialyis catheter right internal jugular vein.  VASCULAR SURGERY  08/28/2019    [facility]. Left upper extremity fistulogram including venography to the superior vena cava. Balloon angioplasty left subclavian vein with 10mm x 40mm conquest balloon. Balloon angioplasty mid/proximal upper arm cephalic vein with 59UT x 40mm conquest balloon. Venograms after balloon angioplasty. Stent left mid/proximal upper arm cephalic vein stenosis fluency 10mm x 60mm self-expanding covered stent. Balloon     VASCULAR SURGERY  2019    cont angioplasty stent with 10mm x 40mm conquest balloon. Completion venograms left upper extremity. Family History   Problem Relation Age of Onset    Colon Cancer Mother          at 63    Colon Polyps Mother     Lung Cancer Father          at 79    Colon Polyps Father     Stomach Cancer Sister     Breast Cancer Sister 27    Depression Daughter 25        committed suicide    Liver Cancer Neg Hx     Liver Disease Neg Hx     Esophageal Cancer Neg Hx     Rectal Cancer Neg Hx      Social History     Socioeconomic History    Marital status: Single     Spouse name: Not on file    Number of children: 2    Years of education: Not on file    Highest education level: Not on file   Occupational History    Not on file   Tobacco Use    Smoking status: Current Every Day Smoker     Packs/day: 0.50     Years: 33.00     Pack years: 16.50     Types: Cigarettes    Smokeless tobacco: Never Used    Tobacco comment: NOW at 1/4 PD, started at age 24 and has averaged 2 PPD   Vaping Use    Vaping Use: Never used   Substance and Sexual Activity    Alcohol use: No    Drug use: Not Currently     Types: Marijuana    Sexual activity: Not Currently     Partners: Male   Other Topics Concern    Not on file   Social History Narrative    Not on file     Social Determinants of Health     Financial Resource Strain:     Difficulty of Paying Living Expenses:    Food Insecurity:     Worried About Running Out of Food in the Last Year:     Ran Out of Food in the Last Year:    Transportation Needs:     Lack of Transportation (Medical):      Lack of Transportation (Non-Medical):    Physical Activity:     Days of Exercise per Week:     Minutes of Exercise per Session:    Stress:     Feeling of Stress :    Social Connections:     Frequency of Communication with Friends and Family:     Frequency of Social Gatherings with Friends and Family:     Attends Taoism Services:     Active Member of Clubs or Organizations:     Attends Club or Organization Meetings:     Marital Status:    Intimate Partner Violence:     Fear of Current or Ex-Partner:     Emotionally Abused:     Physically Abused:     Sexually Abused: Allergies   Allergen Reactions    Penicillins Hives and Shortness Of Breath    Lamisil Advanced [Terbinafine] Nausea And Vomiting    Stadol [Butorphanol] Nausea And Vomiting     And made me feel crazy    Reglan [Metoclopramide] Other (See Comments)     Body twitching and crawling out of her skin feeling     Prior to Admission medications    Medication Sig Start Date End Date Taking?  Authorizing Provider   lactulose (CHRONULAC) 10 GM/15ML solution Take 30 mLs by mouth 3 times daily 4/30/21 5/30/21 Yes Jeanine Ortiz MD   hydrALAZINE (APRESOLINE) 50 MG tablet Take 1.5 tablets by mouth every 8 hours 10/26/20  Yes Jeff Thao MD   hydroCHLOROthiazide (HYDRODIURIL) 25 MG tablet Take 2 tablets by mouth daily 10/26/20  Yes Jeff Thao MD   Ergocalciferol (VITAMIN D2) 10 MCG (400 UNIT) TABS Take 1.25 mg by mouth every 14 days   Yes Historical Provider, MD   senna (SENNA-LAX) 8.6 MG tablet Take 1 tablet by mouth daily   Yes Historical Provider, MD   cloNIDine (CATAPRES) 0.3 MG tablet Take 1 tablet by mouth every 8 hours 10/13/19  Yes Shane Schrader MD   sevelamer (RENVELA) 800 MG tablet Take 1 tablet by mouth 3 times daily (with meals)   Yes Historical Provider, MD   lisinopril (PRINIVIL;ZESTRIL) 20 MG tablet Take 1 tablet by mouth 2 times daily 4/23/19  Yes Harvey Peña DO   metoprolol succinate (TOPROL XL) 100 MG extended release tablet Take 1 tablet by mouth every morning (before breakfast) 4/24/19  Yes Amandeep Oar, DO   albuterol sulfate  (90 Base) MCG/ACT inhaler Inhale 2 puffs into the lungs every 4 hours as needed   Yes Historical Provider, MD   levothyroxine (SYNTHROID) 150 MCG tablet Take 150 mcg by mouth Daily   Yes Historical Provider, MD   NIFEdipine (ADALAT CC) 60 MG extended release tablet Take 60 mg by mouth daily   Yes Historical Provider, MD   isosorbide mononitrate (IMDUR) 120 MG extended release tablet Take 1 tablet by mouth daily 5/27/17  Yes Wayne Aquino DO   DULoxetine (CYMBALTA) 60 MG extended release capsule Take 60 mg by mouth daily   Yes Historical Provider, MD   rOPINIRole (REQUIP) 2 MG tablet Take 4 mg by mouth nightly Indications: Restless Leg Syndrome    Yes Historical Provider, MD   simvastatin (ZOCOR) 40 MG tablet Take 40 mg by mouth nightly    Yes Historical Provider, MD   insulin aspart (NOVOLOG FLEXPEN) 100 UNIT/ML injection pen Inject 10 Units into the skin 3 times daily Indications: Diabetes    Yes Historical Provider, MD   insulin glargine (LANTUS SOLOSTAR) 100 UNIT/ML injection pen Inject 40 Units into the skin daily  Patient taking differently: Inject 18 Units into the skin daily Indications: sliding scale  2/25/21   Thomas Wise,        I have reviewed all pertinent history. Prior medical records and laboratory evaluation reviewed. Imaging independently reviewed. Review of Systems:   As per HPI, otherwise all other ROS performed and found to be negative at this time. Physical Exam:  Vitals:    05/15/21 0916   BP: (!) 149/75   Pulse: 91   Resp: 16   Temp: 96.7 °F (35.9 °C)   SpO2: 100%     CONSTITUTIONAL: Appears ill, chronically ill-appearing  PSYCH: Alert, normal mood  EYES: IMANI, symmetrical. Conjunctiva are moist, non-icteric. Lids are normal  ENT: Oral thrush noted  NECK: Trachea is midline. Nontender  Head: Normocephalic, atraumatic  LUNGS: Normal respiratory effort, no intercostal retractions or accessory muscle use. Clear to auscultation bilaterally with no crackles, wheezes or rales  CARDIOVASCULAR: Regular rate and rhythm, no murmurs, rubs or gallops. No JVD. Pulses are equal bilaterally. GI/ABDOMEN: Soft, non-tender, non-distended, no guarding or rebound. Bowel sounds are normal.   SKIN: Warm and dry.    NEUROLOGICAL: No focal lateralizing weakness  EXTREMITIES: No clubbing or cyanosis    Labs:   Recent Results (from the past 72 hour(s))   COVID-19, Rapid    Collection Time: 05/13/21 12:40 PM    Specimen: Nasopharyngeal Swab   Result Value Ref Range    SARS-CoV-2, NAAT Not Detected Not Detected   CBC Auto Differential    Collection Time: 05/13/21 12:45 PM   Result Value Ref Range    WBC 6.3 4.8 - 10.8 K/uL    RBC 4.65 4.20 - 5.40 M/uL    Hemoglobin 13.3 12.0 - 16.0 g/dL    Hematocrit 42.4 37.0 - 47.0 %    MCV 91.2 81.0 - 99.0 fL    MCH 28.6 27.0 - 31.0 pg    MCHC 31.4 (L) 33.0 - 37.0 g/dL    RDW 17.5 (H) 11.5 - 14.5 %    Platelets 251 080 - 120 K/uL    MPV 10.1 9.4 - 12.3 fL    Neutrophils % 78.9 (H) 50.0 - 65.0 %    Lymphocytes % 11.2 (L) 20.0 - 40.0 %    Monocytes % 6.1 0.0 - 10.0 %    Eosinophils % 1.9 0.0 - 5.0 %    Basophils % 1.6 (H) 0.0 - 1.0 %    Neutrophils Absolute 4.9 1.5 - 7.5 K/uL    Immature Granulocytes # 0.0 K/uL    Lymphocytes Absolute 0.7 (L) 1.1 - 4.5 K/uL    Monocytes Absolute 0.40 0.00 - 0.90 K/uL    Eosinophils Absolute 0.10 0.00 - 0.60 K/uL    Basophils Absolute 0.10 0.00 - 0.20 K/uL   Basic Metabolic Panel    Collection Time: 05/13/21 12:45 PM   Result Value Ref Range    Sodium 131 (L) 136 - 145 mmol/L    Potassium 2.2 (LL) 3.5 - 5.0 mmol/L    Chloride 91 (L) 98 - 111 mmol/L    CO2 26 22 - 29 mmol/L    Anion Gap 14 7 - 19 mmol/L    Glucose 432 (H) 74 - 109 mg/dL    BUN 6 6 - 20 mg/dL    CREATININE 2.1 (H) 0.5 - 0.9 mg/dL    GFR Non-African American 25 (A) >60    GFR  30 (L) >59    Calcium 9.7 8.6 - 10.0 mg/dL   Comprehensive Metabolic Panel    Collection Time: 05/15/21  7:29 AM Result Value Ref Range    Sodium 113 (LL) 136 - 145 mmol/L    Potassium 4.3 3.5 - 5.0 mmol/L    Chloride 77 (L) 98 - 111 mmol/L    CO2 21 (L) 22 - 29 mmol/L    Anion Gap 15 7 - 19 mmol/L    Glucose 1,038 (HH) 74 - 109 mg/dL    BUN 28 (H) 6 - 20 mg/dL    CREATININE 4.6 (H) 0.5 - 0.9 mg/dL    GFR Non-African American 10 (A) >60    GFR  12 (L) >59    Calcium 10.1 (H) 8.6 - 10.0 mg/dL    Total Protein 7.9 6.6 - 8.7 g/dL    Albumin 4.3 3.5 - 5.2 g/dL    Total Bilirubin 0.4 0.2 - 1.2 mg/dL    Alkaline Phosphatase 134 (H) 35 - 104 U/L    ALT 9 5 - 33 U/L    AST 13 5 - 32 U/L   CBC Auto Differential    Collection Time: 05/15/21  7:29 AM   Result Value Ref Range    WBC 8.8 4.8 - 10.8 K/uL    RBC 4.15 (L) 4.20 - 5.40 M/uL    Hemoglobin 12.2 12.0 - 16.0 g/dL    Hematocrit 38.2 37.0 - 47.0 %    MCV 92.0 81.0 - 99.0 fL    MCH 29.4 27.0 - 31.0 pg    MCHC 31.9 (L) 33.0 - 37.0 g/dL    RDW 17.5 (H) 11.5 - 14.5 %    Platelets 403 162 - 337 K/uL    MPV 9.8 9.4 - 12.3 fL    Neutrophils % 81.0 (H) 50.0 - 65.0 %    Lymphocytes % 8.6 (L) 20.0 - 40.0 %    Monocytes % 8.1 0.0 - 10.0 %    Eosinophils % 0.8 0.0 - 5.0 %    Basophils % 1.0 0.0 - 1.0 %    Neutrophils Absolute 7.1 1.5 - 7.5 K/uL    Immature Granulocytes # 0.0 K/uL    Lymphocytes Absolute 0.8 (L) 1.1 - 4.5 K/uL    Monocytes Absolute 0.70 0.00 - 0.90 K/uL    Eosinophils Absolute 0.10 0.00 - 0.60 K/uL    Basophils Absolute 0.10 0.00 - 0.20 K/uL   POCT GLUCOSE    Collection Time: 05/15/21  7:38 AM   Result Value Ref Range    Glucose  mg/dL   COVID-19, Rapid    Collection Time: 05/15/21  8:30 AM    Specimen: Nasopharyngeal Swab   Result Value Ref Range    SARS-CoV-2, NAAT Not Detected Not Detected       Imaging: XR KNEE LEFT (1-2 VIEWS)    Result Date: 4/29/2021  Examination. XR KNEE LEFT (1-2 VIEWS) 4/29/2021 6:14 AM History: The patient fell and complains of pain in the left knee.  Frontal and lateral views of the left knee are compared with the previous study dated 2/24/2021. There is no evidence of recent fracture or dislocation. There is moderate diffuse osteopenia. No focal bony erosion. No soft tissue swelling or a mass. There is moderate diffuse narrowing of the tibiofemoral joint space similar to the previous study. No significant osteophytes. No joint effusion. The severe atheromatous changes of the popliteal and tibial arteries are seen. No acute bony abnormality. A moderate diffuse osteopenia. A mild to moderate chronic osteoarthritis. Signed by Dr Ruby White on 4/29/2021 7:37 AM    CT CHEST W CONTRAST    Result Date: 4/29/2021  Examination. CT CHEST W CONTRAST 4/29/2021 6:42 AM History: The patient fell and complains of left-sided chest pain. DLP: 205 mGycm. The CT scan of the chest is performed after intravenous contrast enhancement. The images are acquired in axial plane and subsequent reconstruction in coronal and sagittal planes. There is no previous similar study for comparison. The correlation is made with chest radiograph dated 10/24/2020. The lungs are moderately well-expanded. There is an ill-defined groundglass infiltrate in the left lower lobe. There are atelectatic changes in the lingular segment of the left upper lobe. There is an area of pleural thickening involving the right lower lung laterally, in the lateral part of the inferolateral right major fissure which appears to be present a chronic process/inflammation/scarring. There is another ill-defined density in the right middle lobe posteriorly, adjacent and hit into the right major fissure which also represent a of scarring. No discrete solid complements/nodule. No areas of focal consolidation or infiltrate. No pneumothorax. The limited included soft tissues of the neck. Unremarkable. There is enlargement of the cardiac lobe of the thyroid gland which is extending posteriorly adjacent and to the left of the esophagus. There is an ill-defined nodule in this part of the thyroid gland.  There is no axillary lymphadenopathy. Atheromatous changes of the thoracic aorta are seen. No aneurysmal dilatation. A moderate diffuse dilatation of the main pulmonary artery and right and left pulmonary arteries. No filling defect in the opacified pulmonary arterial bed. Atheromatous changes of coronary arteries are seen. There are a few borderline prominent mediastinal lymph nodes. A precarinal lymph node measures 1.1 cm in short axis. A level 4L lymph node measures 1.2 cm in short axis. No abnormal hilar lymph nodes are seen. The limited visualized liver and spleen are unremarkable. The gallbladder is surgically absent. The adrenal glands are unremarkable. The pancreas and kidneys are incompletely visualized and not evaluated. The images reviewed in bone window show deformity of the left second rib anteriorly which is represent an old healed fracture. No acute fracture or acute bony abnormality seen. A prominent Schmorl node is seen in the superior endplate of vertebra L2. An ill-defined groundglass infiltrate in the left lower lung may represent an evolving pneumonia/inflammatory process. Further follow-up is suggested. The scattered areas of pleural thickening and scarring representing a chronic process. Pulmonary arterial hypertension. Right thyroid nodule may be further correlated with sonography. No acute bony abnormality. Signed by Dr Alek Harrell on 4/29/2021 8:00 AM    CT ABDOMEN PELVIS W IV CONTRAST Additional Contrast? None    Result Date: 4/29/2021  CT ABDOMEN PELVIS W IV CONTRAST 4/29/2021 6:27 AM HISTORY: Left rib pain after fall COMPARISON: None. DLP: 205 mGy cm. Automated exposure control was utilized to diminish patient radiation dose. TECHNIQUE: Following the intravenous administration of contrast, helical CT tomographic images of the abdomen and pelvis were acquired. Coronal reformatted images were also provided for review. FINDINGS: Mild left basilar subsegmental atelectasis is present. collections are present. No evidence of obstructive uropathy. 3. No evidence of soft tissue organ injury or hemoperitoneum. There is a normal bowel gas pattern. 4. Mild stranding noted rather diffusely of the subcutaneous fat within both the abdomen and pelvis and lower chest. This would suggest an element of anasarca/body wall edema. . Signed by Dr Ludmila Casanova on 4/29/2021 8:22 AM    XR HIP 2-3 VW W PELVIS LEFT    Result Date: 4/29/2021  History: Fall with pain Left hip: Frontal view the pelvis is obtained with frontal and frog-leg lateral views left hip. COMPARISON: 3/12/2021 FINDINGS: There is osteopenia. No fracture or dislocation identified. Hip joint spaces are preserved. Diffuse vascular calcifications. Small pelvic phleboliths. 1. Osteopenia with no acute osseous pathology identified. Signed by Dr Binta Chapa on 4/29/2021 7:34 AM      Assessment/Plan:     Principal Problem:    Severe hyperglycemia due to diabetes mellitus ([address]  Active Problems:    Gastroesophageal reflux disease without esophagitis    Gastroparesis    Chronic constipation    Uncontrolled type 2 diabetes mellitus with complication, with long-term current use of insulin ([facility])    PVD (peripheral vascular disease) ([address]    ESRD on hemodialysis ([address]    Medically noncompliant    Chronic nausea  Resolved Problems:    * No resolved hospital problems.  *       Severe hyperglycemia due to diabetes mellitus, blood glucose over 1000-present on admission  Frequent POC glucose monitoring with sliding scale insulin correction  Adjust basal insulin  Continue prandial insulin 10 units with meals    Hyponatremia-corrected sodium 128  Monitor with dialysis and correction of hyperglycemia    ESRD on dialysis  Nephrology consult to manage dialytic needs    Uncontrolled hypertension  Resume home antihypertensive regimen, adjust medications as needed    Oral thrush  Treat with oral nystatin 4 times daily    Chronic constipation  Bowel

## 2021-05-15 NOTE — PROGRESS NOTES
4 Eyes Skin Assessment    John Mercedes is being assessed upon: Admission    I agree that I, Matty Ribera RN, along with Afshan RN (either 2 RN's or 1 LPN and 1 RN) have performed a thorough Head to Toe Skin Assessment on the patient. ALL assessment sites listed below have been assessed. Areas assessed by both nurses:     [x]   Head, Face, and Ears   [x]   Shoulders, Back, and Chest  [x]   Arms, Elbows, and Hands   [x]   Coccyx, Sacrum, and Ischium  [x]   Legs, Feet, and Heels    Does the Patient have Skin Breakdown? Yes, wound(s) noted upon assessment. It is the responsibility of the Primary Nurse to assure that the following documentation, preventions, orders, and consults are complete on the above noted wound(s): Wound LDA initiated. LDA Flowsheet Documentation includes the Tracie-wound, Wound Assessment, Measurements, Dressing Treatment, Drainage, and Color.     Marek Prevention initiated: Yes  Wound Care Orders initiated: No    WOC nurse consulted for Pressure Injury (Stage 3,4, Unstageable, DTI, NWPT, and Complex wounds) and New or Established Ostomies: No        Primary Nurse eSignature: Matty Ribera RN on 5/15/2021 at 3:59 PM      Co-Signer eSignature: Electronically signed by Matty Ribera RN on 5/15/21 at 4:00 PM CDT

## 2021-05-15 NOTE — CONSULTS
Nephrology ([facility]) Consult Note      Patient:  John Mercedes  YOB: 1970  Date of Service: 5/15/2021  MRN: [id number]   Acct: [de-identified]   Primary Care Physician: ROMINA Mcdermott  Advance Directive: Full Code  Admit Date: 5/15/2021       Hospital Day: 0  Referring Provider: Woody Sotomayor MD    Patient independently seen and examined, Chart, Consults, Notes, Operative notes, Labs, Cardiology, and Radiology studies reviewed as available. Chief complaint: Profound weakness/ESRD/severe hyperglycemia. Subjective:  John Mercedes is a 45 y.o. female  whom we were consulted for end-stage renal disease. Presented to the emergency room as she has severe oral thrush. Patient has end-stage renal disease, goes to [facility] dialysis on Tuesday Thursday Saturday. She decided to come to the emergency room as she was not feeling well. Her blood sugar was more than 1000. Now admitted for the treatment of severe hyperglycemia/oral thrush/need for dialysis. Routine lab data-showed serum sodium is 113. Currently seen on hemodialysis  Hemodialysis access: 3-hour 45-minute  Ultrafiltration: 5000 cc  2K bath  Blood flow rate is 450 cc/min.     Allergies:  Penicillins, Lamisil advanced [terbinafine], Stadol [butorphanol], and Reglan [metoclopramide]    Medicines:  Current Facility-Administered Medications   Medication Dose Route Frequency Provider Last Rate Last Admin    insulin lispro (HUMALOG) injection vial 0-12 Units  0-12 Units Subcutaneous Q4H Woody Sotomayor MD        insulin glargine (LANTUS) injection vial 20 Units  20 Units Subcutaneous Once Woody Sotomayor MD        nystatin (MYCOSTATIN) 153479 UNIT/ML suspension 500,000 Units  5 mL Oral 4x Daily Woody Sotomayor MD        sodium chloride flush 0.9 % injection 5-40 mL  5-40 mL Intravenous 2 times per day Woody Sotomayor MD        sodium chloride flush 0.9 % injection 5-40 mL  5-40 mL Intravenous PRN Kassie Reyez MD John        0.9 % sodium chloride infusion  25 mL Intravenous PRN Ying Chavez MD        heparin (porcine) injection 5,000 Units  5,000 Units Subcutaneous 3 times per day Ying Chavez MD        promethazine (PHENERGAN) tablet 12.5 mg  12.5 mg Oral Q6H PRN Ying Chavez MD        Or    [facility]dans[facility]ron [facility] injection 4 mg  4 mg Intravenous Q6H PRN Ying Chavez MD        polyethylene glycol (GLYCOLAX) packet 17 g  17 g Oral Daily PRN Ying Chavez MD        acetaminophen (TYLENOL) tablet 650 mg  650 mg Oral Q6H PRN Ying Chavez MD        Or   Huang acetaminophen (TYLENOL) suppository 650 mg  650 mg Rectal Q6H PRN Ying Chavez MD           Past Medical History:  Past Medical History:   Diagnosis Date    Arthritis     Chronic kidney disease, stage 5, [facility] failure [address]     Closed fracture of right shoulder girdle, with routine healing, subsequent encounter     DM (diabetes mellitus) type II controlled with renal manifestation ([facility] 06/1993    Gastroparesis     GERD (gastroesophageal reflux disease)     Hemodialysis patient ([facility]     dialysis on tues, thur, and sat at [facility]    Hypertension     Hypothyroid     Nasal congestion     recent    Noncompliance with medications     Palliative care patient 10/08/2019    Restless leg syndrome        Past Surgical History:  Past Surgical History:   Procedure Laterality Date    BREAST SURGERY Left 2008    infected milk gland removed    CHOLECYSTECTOMY, LAPAROSCOPIC      2008    COLONOSCOPY Left 9/17/2020    Dr Hernández Perish hepatic flexure-Severe tortuosity with severe spasming, 1 yr recall    DIALYSIS FISTULA CREATION Left 1/25/2019    REVISION LEFT UPPER EXTREMITY AV ACCESS WITH LEFT BRACHIAL ARTERY TO LEFT AXILLARY VEIN WITH  INTERPOSITIONAL ARTEGRAFT   performed by Parish Sherman MD at [address]  2012    [address] Right 1/25/2019    INSERTION OF RIGHT INTERNAL JUGULAR HEMODIALYSIS CATHETER performed by Janee Echeverria MD at [facility]  2018    1.  Moderately increased stainable iron identified by special stain in Kupffer cells and occasional hepatocytes. Negative for evidence of significant portal or lobular inflammation. Negative for evidence of significant fibrosis    IA COLONOSCOPY W/BIOPSY SINGLE/MULTIPLE N/A 10/20/2017    Dr Joni Mcgrath prep-Tubular AP (-) dysplasia x 2--3 yr recall    IA EGD TRANSORAL BIOPSY SINGLE/MULTIPLE N/A 2016    Dr Oropeza Cassette EGD TRANSORAL BIOPSY SINGLE/MULTIPLE N/A 10/20/2017    Dr Emelyn Cortez (-)   [address] [facility] VASCULAR SURGERY Left 2016    fistula by Dr Christine Strauss at [address]  10/02/2017    SJS. Left upper fistulograms/venograms, balloon angioplasty cephalic vein arch 38G33 conquest.    VASCULAR SURGERY  2018    FISTULOGRAM    VASCULAR SURGERY  10/29/2018    SJS. Left upper fistulograms/venograms    VASCULAR SURGERY  2018    SJS. Arch aortogram,left upper arteriograms.  VASCULAR SURGERY  2019    SJS. Removal of tunneled dialyis catheter right internal jugular vein.  VASCULAR SURGERY  2019    SJS. Left upper extremity fistulogram including venography to the superior vena cava. Balloon angioplasty left subclavian vein with 10mm x 40mm conquest balloon. Balloon angioplasty mid/proximal upper arm cephalic vein with 62FI x 40mm conquest balloon. Venograms after balloon angioplasty. Stent left mid/proximal upper arm cephalic vein stenosis fluency 10mm x 60mm self-expanding covered stent. Balloon     VASCULAR SURGERY  2019    cont angioplasty stent with 10mm x 40mm conquest balloon. Completion venograms left upper extremity.        Family History  Family History   Problem Relation Age of Onset    Colon Cancer Mother          at 63    Colon Polyps Mother     Lung Cancer Father          at 66    Colon Polyps Father     Stomach Cancer Sister     Breast Cancer Sister 27    Depression Daughter 25        committed suicide    Liver Cancer Neg Hx     Liver Disease Neg Hx     Esophageal Cancer Neg Hx     Rectal Cancer Neg Hx        Social History  Social History     Socioeconomic History    Marital status: Single     Spouse name: Not on file    Number of children: 2    Years of education: Not on file    Highest education level: Not on file   Occupational History    Not on file   Tobacco Use    Smoking status: Current Every Day Smoker     Packs/day: 0.50     Years: 33.00     Pack years: 16.50     Types: Cigarettes    Smokeless tobacco: Never Used    Tobacco comment: NOW at 1/4 PD, started at age 24 and has averaged 2 PPD   Vaping Use    Vaping Use: Never used   Substance and Sexual Activity    Alcohol use: No    Drug use: Not Currently     Types: Marijuana    Sexual activity: Not Currently     Partners: Male   Other Topics Concern    Not on file   Social History Narrative    Not on file     Social Determinants of Health     Financial Resource Strain:     Difficulty of Paying Living Expenses:    Food Insecurity:     Worried About Running Out of Food in the Last Year:     Ran Out of Food in the Last Year:    Transportation Needs:     Lack of Transportation (Medical):      Lack of Transportation (Non-Medical):    Physical Activity:     Days of Exercise per Week:     Minutes of Exercise per Session:    Stress:     Feeling of Stress :    Social Connections:     Frequency of Communication with Friends and Family:     Frequency of Social Gatherings with Friends and Family:     Attends Judaism Services:     Active Member of Clubs or Organizations:     Attends Club or Organization Meetings:     Marital Status:    Intimate Partner Violence:     Fear of Current or Ex-Partner:     Emotionally Abused:     Physically Abused:     Sexually Abused:          Review of Systems:  History obtained from chart review and the patient  General ROS: No fever or chills  Respiratory ROS: No cough, shortness of breath, wheezing  Cardiovascular ROS: No chest pain or palpitations  Gastrointestinal ROS: No abdominal pain or melena  Genito-Urinary ROS: No dysuria or hematuria  Musculoskeletal ROS: No joint pain or swelling   14 point ROS reviewed with the patient and negative except as noted above and in the HPI unless unable to obtain. Objective:  Patient Vitals for the past 24 hrs:   BP Temp Pulse Resp SpO2 Height Weight   05/15/21 0916 (!) 149/75 96.7 °F (35.9 °C) 91 16 100 % -- --   05/15/21 0730 (!) 159/74 -- 94 18 93 % -- --   05/15/21 0611 (!) 162/78 98 °F (36.7 °C) 90 20 93 % 5' 6\" (1.676 m) 152 lb (68.9 kg)     No intake or output data in the 24 hours ending 05/15/21 1048  General: awake/alert   HEENT: Normocephalic atraumatic  Neck: Supple with no JVD or carotid bruits. Chest:  clear to auscultation bilaterally  CVS: regular rate and rhythm  Abdominal: soft, nontender, normal bowel sounds  Extremities: no cyanosis or edema  Skin: warm and dry without rash      Labs:  BMP:   Recent Labs     05/13/21  1245 05/15/21  0729   * 113*   K 2.2* 4.3   CL 91* 77*   CO2 26 21*   BUN 6 28*   CREATININE 2.1* 4.6*   CALCIUM 9.7 10.1*     CBC:   Recent Labs     05/13/21  1245 05/15/21  0729   WBC 6.3 8.8   HGB 13.3 12.2   HCT 42.4 38.2   MCV 91.2 92.0    148     LIVER PROFILE:   Recent Labs     05/15/21  0729   AST 13   ALT 9   BILITOT 0.4   ALKPHOS 134*     PT/INR: No results for input(s): PROTIME, INR in the last 72 hours. APTT: No results for input(s): APTT in the last 72 hours. BNP:  No results for input(s): BNP in the last 72 hours. Ionized Calcium:No results for input(s): IONCA in the last 72 hours. Magnesium:No results for input(s): MG in the last 72 hours. Phosphorus:No results for input(s): PHOS in the last 72 hours. HgbA1C: No results for input(s): LABA1C in the last 72 hours.   Hepatic:   Recent Labs     05/15/21  0729   ALKPHOS 134*   ALT 9   AST 13 PROT 7.9   BILITOT 0.4   LABALBU 4.3     Lactic Acid: No results for input(s): LACTA in the last 72 hours. Troponin: No results for input(s): CKTOTAL, CKMB, TROPONINT in the last 72 hours. ABGs: No results for input(s): PH, PCO2, PO2, HCO3, O2SAT in the last 72 hours. CRP:  No results for input(s): CRP in the last 72 hours. Sed Rate:  No results for input(s): SEDRATE in the last 72 hours. Cultures:   No results for input(s): CULTURE in the last 72 hours. No results for input(s): BC, Eppie Julienne in the last 72 hours. No results for input(s): CXSURG in the last 72 hours. Radiology reports as per the Radiologist  Radiology: No results found. Assessment   1. End-stage renal disease/currently seen on dialysis. 2.  Severe hyponatremia. 3.  Type II diabetic nephropathy. 4.  Poorly controlled insulin-dependent type 2 diabetes. 5.  Oral thrush. 6.  Noncompliant with fluid restriction. Plan:  1. Tolerating dialysis well. 2.  Ultrafiltration up to 5000 cc  3. Nystatin swish and swallow      Thank you for the consult, we appreciate the opportunity to provide care to your patients. Feel free to contact me if I can be of any further assistance.       Zacarias Howell MD  05/15/21  10:48 AM

## 2021-05-15 NOTE — ED PROVIDER NOTES
[facility]  eMERGENCY dEPARTMENT eNCOUnter      Pt Name: Frank Downs  MRN: [id number]  [place] 1970  Date of evaluation: 5/15/2021  Provider: Agustina Nelson MD    [address] [place] [address]       Chief Complaint   Patient presents with    Mouth Lesions     pain started thursday tthrush to tongue         HISTORY OF PRESENT ILLNESS   (Location/Symptom, Timing/Onset,Context/Setting, Quality, Duration, Modifying Factors, Severity)  Note limiting factors. Frank Downs is a 45 y.o. female who presents to the emergency department with mouth lesions that started on Thursday. The patient has thrush. The patient dialyzes. She also diabetic she tells me she took her insulin this morning. She is asking for a glucose check. It was 300 something earlier. The patient also complains of bilateral ear pain. She is awake alert. The patient was just discharged from the hospital 2 weeks ago. Patient dialyzes Tuesday Thursday Saturday she states she went Tuesday and Thursday she went to dialysis outside this morning he told her to come to the ER since she was \"sickly. \"    The history is provided by the patient and medical records. NursingNotes were reviewed. REVIEW OF SYSTEMS    (2-9 systems for level 4, 10 or more for level 5)     Review of Systems   Constitutional: Negative for fever. HENT: Positive for ear pain and sore throat. Respiratory: Negative for shortness of breath. Cardiovascular: Negative for chest pain. Gastrointestinal: Negative for abdominal pain and vomiting. Neurological: Negative for syncope. Psychiatric/Behavioral: Negative for confusion. A complete review of systems was performed and is negative except as noted above in the HPI.        PAST MEDICAL HISTORY     Past Medical History:   Diagnosis Date    Arthritis     Chronic kidney disease, stage 5, kidney failure ([address]     Closed fracture of right shoulder girdle, with routine healing, subsequent encounter     DM (diabetes mellitus) type II controlled with renal manifestation ([facility] [address] [address] 06/1993    Gastroparesis     GERD (gastroesophageal reflux disease)     Hemodialysis patient ([facility] [address]     dialysis on tues, thur, and sat at [facility]    Hypertension     Hypothyroid     Nasal congestion     recent    Noncompliance with medications     Palliative care patient 10/08/2019    Restless leg syndrome          SURGICAL HISTORY       Past Surgical History:   Procedure Laterality Date    BREAST SURGERY Left 2008    infected milk gland removed    CHOLECYSTECTOMY, LAPAROSCOPIC      2008    COLONOSCOPY Left 9/17/2020    Dr Cason Fragmin hepatic flexure-Severe tortuosity with severe spasming, 1 yr recall    DIALYSIS FISTULA CREATION Left 1/25/2019    REVISION LEFT UPPER EXTREMITY AV ACCESS WITH LEFT BRACHIAL ARTERY TO LEFT AXILLARY VEIN WITH  INTERPOSITIONAL ARTEGRAFT   performed by Jayla Mott MD at [address]  2012    [address] Right 1/25/2019    INSERTION OF RIGHT INTERNAL JUGULAR HEMODIALYSIS CATHETER performed by Jayla Mott MD at [address] [facility]  08/17/2018    1.  Moderately increased stainable iron identified by special stain in Kupffer cells and occasional hepatocytes. Negative for evidence of significant portal or lobular inflammation. Negative for evidence of significant fibrosis    CT COLONOSCOPY W/BIOPSY SINGLE/MULTIPLE N/A 10/20/2017    Dr Lacey Hem prep-Tubular AP (-) dysplasia x 2--3 yr recall    CT EGD TRANSORAL BIOPSY SINGLE/MULTIPLE N/A 12/27/2016    Dr Ben Mendoza EGD TRANSORAL BIOPSY SINGLE/MULTIPLE N/A 10/20/2017    Dr Woodroe Nissen (-)   [facility] VASCULAR SURGERY Left 2016    fistula by Dr Nany Link at [address]  10/02/2017    SJS. Left upper fistulograms/venograms, balloon angioplasty cephalic vein arch 38T81 conquest.    VASCULAR SURGERY  08/2018    FISTULOGRAM    VASCULAR SURGERY  10/29/2018    SJS. Left upper fistulograms/venograms    VASCULAR SURGERY  12/19/2018    SJS. Arch aortogram,left upper arteriograms.  VASCULAR SURGERY  03/06/2019    SJS. Removal of tunneled dialyis catheter right internal jugular vein.  VASCULAR SURGERY  08/28/2019    SJS. Left upper extremity fistulogram including venography to the superior vena cava. Balloon angioplasty left subclavian vein with 10mm x 40mm conquest balloon. Balloon angioplasty mid/proximal upper arm cephalic vein with 46KG x 40mm conquest balloon. Venograms after balloon angioplasty. Stent left mid/proximal upper arm cephalic vein stenosis fluency 10mm x 60mm self-expanding covered stent. Balloon     VASCULAR SURGERY  08/28/2019    cont angioplasty stent with 10mm x 40mm conquest balloon. Completion venograms left upper extremity.          CURRENT MEDICATIONS       Previous Medications    ALBUTEROL SULFATE  (90 BASE) MCG/ACT INHALER    Inhale 2 puffs into the lungs every 4 hours as needed    CLONIDINE (CATAPRES) 0.3 MG TABLET    Take 1 tablet by mouth every 8 hours    DULOXETINE (CYMBALTA) 60 MG EXTENDED RELEASE CAPSULE    Take 60 mg by mouth daily    ERGOCALCIFEROL (VITAMIN D2) 10 MCG (400 UNIT) TABS    Take 1.25 mg by mouth every 14 days    HYDRALAZINE (APRESOLINE) 50 MG TABLET    Take 1.5 tablets by mouth every 8 hours    HYDROCHLOROTHIAZIDE (HYDRODIURIL) 25 MG TABLET    Take 2 tablets by mouth daily    INSULIN ASPART (NOVOLOG FLEXPEN) 100 UNIT/ML INJECTION PEN    Inject 10 Units into the skin 3 times daily Indications: Diabetes     INSULIN GLARGINE (LANTUS SOLOSTAR) 100 UNIT/ML INJECTION PEN    Inject 40 Units into the skin daily    ISOSORBIDE MONONITRATE (IMDUR) 120 MG EXTENDED RELEASE TABLET    Take 1 tablet by mouth daily    LACTULOSE (CHRONULAC) 10 GM/15ML SOLUTION    Take 30 mLs by mouth 3 times daily    LEVOTHYROXINE (SYNTHROID) 150 MCG TABLET    Take 150 mcg by mouth Daily    LISINOPRIL (PRINIVIL;ZESTRIL) 20 MG TABLET    Take 1 tablet by mouth 2 times daily Insecurity:     Worried About Running Out of Food in the Last Year:     [address] in the Last Year:    Transportation Needs:     Lack of Transportation (Medical):  Lack of Transportation (Non-Medical):    Physical Activity:     Days of Exercise per Week:     Minutes of Exercise per Session:    Stress:     Feeling of Stress :    Social Connections:     Frequency of Communication with Friends and Family:     Frequency of Social Gatherings with Friends and Family:     Attends Protestant Services:     Active Member of Clubs or Organizations:     Attends Club or Organization Meetings:     Marital Status:    Intimate Partner Violence:     Fear of Current or Ex-Partner:     Emotionally Abused:     Physically Abused:     Sexually Abused:        SCREENINGS             PHYSICAL EXAM    (up to 7 for level 4, 8 or more for level 5)     ED Triage Vitals [05/15/21 0611]   BP Temp Temp src Pulse Resp SpO2 Height Weight   (!) 162/78 98 °F (36.7 °C) -- 90 20 93 % 5' 6\" (1.676 m) 152 lb (68.9 kg)       Physical Exam  Vitals and nursing note reviewed. Constitutional:       General: She is not in acute distress. Appearance: She is not ill-appearing. HENT:      Head: Normocephalic and atraumatic. Comments: Bilateral TMs are not perforated. There is no sign of obvious otitis media. There is some fluid behind the TMs bilaterally. Mouth/Throat:      Comments: Patient has thrush caked on her tongue and posterior oropharynx. Cardiovascular:      Rate and Rhythm: Normal rate and regular rhythm. Pulmonary:      Effort: Pulmonary effort is normal. No respiratory distress. Abdominal:      General: Abdomen is flat. Tenderness: There is no abdominal tenderness. Musculoskeletal:      Cervical back: Normal range of motion and neck supple. Skin:     General: Skin is warm. Neurological:      General: No focal deficit present.       Mental Status: She is alert and oriented to person, place, and time.      GCS: GCS eye subscore is 4. GCS verbal subscore is 5. GCS motor subscore is 6. Psychiatric:         Mood and Affect: Mood normal.         Behavior: Behavior normal.         DIAGNOSTIC RESULTS     EKG: All EKG's are interpreted by the Emergency Department Physician who either signs or Co-signs this chart in the absence of a cardiologist.        RADIOLOGY:   Non-plain film images such as CT, Ultrasound and MRI are read by the radiologist. Kena Mort images are visualized and preliminarily interpreted by the emergency physician with the below findings:        Interpretation per the Radiologist below, if available at the time of this note:    No orders to display         ED BEDSIDE ULTRASOUND:   Performed by ED Physician - none    LABS:  Labs Reviewed   COMPREHENSIVE METABOLIC PANEL - Abnormal; Notable for the following components:       Result Value    Sodium 113 (*)     Chloride 77 (*)     CO2 21 (*)     Glucose 1,038 (*)     BUN 28 (*)     CREATININE 4.6 (*)     GFR Non- 10 (*)     GFR  12 (*)     Calcium 10.1 (*)     Alkaline Phosphatase 134 (*)     All other components within normal limits    Narrative:     [address] tel[address] ,  Chemistry results called to and read back by [facility]-ELMA MALDONADO RN AT [address], 05/15/2021  08:24, by ANTONIO   CBC WITH AUTO DIFFERENTIAL - Abnormal; Notable for the following components:    RBC 4.15 (*)     MCHC 31.9 (*)     RDW 17.5 (*)     Neutrophils % 81.0 (*)     Lymphocytes % 8.6 (*)     Lymphocytes Absolute 0.8 (*)     All other components within normal limits   POCT GLUCOSE - Normal   COVID-19, RAPID       All other labs were within normal range or not returned as of this dictation.     EMERGENCY DEPARTMENT COURSE and DIFFERENTIALDIAGNOSIS/MDM:   Vitals:    Vitals:    05/15/21 0611 05/15/21 0730   BP: (!) 162/78 (!) 159/74   Pulse: 90 94   Resp: 20 18   Temp: 98 °F (36.7 °C)    SpO2: 93% 93%   Weight: 152 lb (68.9 kg)    Height: 5' 6\" (1.676 m) MDM  Number of Diagnoses or Management Options  ESRD on hemodialysis ([facility] [address]  Thrush, oral  Type 2 diabetes mellitus with hyperglycemia, with long-term current use of insulin ([facility] [address]  Diagnosis management comments:   Patient with oral thrush. Treat. Patient tells me that her sugar seems high. B MP was sent after her bedside glucose was 500+. 8:40 AM CDT  Sodium corrects to like 128. Found to have sugar over thousand. She is really noncompliant with her insulin she tells me she took it she did not take her insulin. She needs dialysis today as well. Admit to hospitalist service nephrology consult placed. Patient at high risk for morbidity and mortality secondary to noncompliance with her diabetic regimen. Amount and/or Complexity of Data Reviewed  Clinical lab tests: ordered and reviewed  Decide to obtain previous medical records or to obtain history from someone other than the patient: yes  Discuss the patient with other providers: yes    Risk of Complications, Morbidity, and/or Mortality  Presenting problems: high    Patient Progress  Patient progress: stable        CONSULTS:  IP CONSULT TO NEPHROLOGY     Discussed with Reza Estevez on-call for nephrology to get patient dialyzed. PROCEDURES:  Unless otherwise notedbelow, none     Procedures    FINAL IMPRESSION     1. Thrush, oral    2. Type 2 diabetes mellitus with hyperglycemia, with long-term current use of insulin (HCC)    3. ESRD on hemodialysis ([facility] [address]          DISPOSITION/PLAN   DISPOSITION        PATIENT REFERRED TO:  No follow-up provider specified.     DISCHARGE MEDICATIONS:  New Prescriptions    No medications on file          (Please note that portions of this note were completed with a voice recognition program.  Efforts were made to edit the dictations butoccasionally words are mis-transcribed.)    Carlos Eduardo Romero MD (electronically signed)  AttendingEmerMcGehee Hospitalcy Physician         Carlos Eduardo Romero MD  05/15/21 4637

## 2021-05-16 VITALS
OXYGEN SATURATION: 100 % | WEIGHT: 152 LBS | TEMPERATURE: 96.5 F | HEIGHT: 66 IN | DIASTOLIC BLOOD PRESSURE: 84 MMHG | HEART RATE: 74 BPM | BODY MASS INDEX: 24.43 KG/M2 | SYSTOLIC BLOOD PRESSURE: 198 MMHG | RESPIRATION RATE: 18 BRPM

## 2021-05-16 LAB
ANION GAP SERPL CALCULATED.3IONS-SCNC: 14 MMOL/L (ref 7–19)
BASOPHILS ABSOLUTE: 0.1 K/UL (ref 0–0.2)
BASOPHILS RELATIVE PERCENT: 1.5 % (ref 0–1)
BUN BLDV-MCNC: 15 MG/DL (ref 6–20)
CALCIUM SERPL-MCNC: 8.8 MG/DL (ref 8.6–10)
CHLORIDE BLD-SCNC: 92 MMOL/L (ref 98–111)
CO2: 24 MMOL/L (ref 22–29)
CREAT SERPL-MCNC: 3.2 MG/DL (ref 0.5–0.9)
EOSINOPHILS ABSOLUTE: 0.2 K/UL (ref 0–0.6)
EOSINOPHILS RELATIVE PERCENT: 3.1 % (ref 0–5)
GFR AFRICAN AMERICAN: 18
GFR NON-AFRICAN AMERICAN: 15
GLUCOSE BLD-MCNC: 124 MG/DL (ref 70–99)
GLUCOSE BLD-MCNC: 174 MG/DL (ref 74–109)
GLUCOSE BLD-MCNC: 222 MG/DL (ref 70–99)
GLUCOSE BLD-MCNC: 66 MG/DL (ref 70–99)
GLUCOSE BLD-MCNC: 86 MG/DL (ref 70–99)
HCT VFR BLD CALC: 35 % (ref 37–47)
HEMOGLOBIN: 11.1 G/DL (ref 12–16)
IMMATURE GRANULOCYTES #: 0 K/UL
LYMPHOCYTES ABSOLUTE: 1.3 K/UL (ref 1.1–4.5)
LYMPHOCYTES RELATIVE PERCENT: 18.3 % (ref 20–40)
MCH RBC QN AUTO: 28.6 PG (ref 27–31)
MCHC RBC AUTO-ENTMCNC: 31.7 G/DL (ref 33–37)
MCV RBC AUTO: 90.2 FL (ref 81–99)
MONOCYTES ABSOLUTE: 0.8 K/UL (ref 0–0.9)
MONOCYTES RELATIVE PERCENT: 10.5 % (ref 0–10)
NEUTROPHILS ABSOLUTE: 4.7 K/UL (ref 1.5–7.5)
NEUTROPHILS RELATIVE PERCENT: 66.5 % (ref 50–65)
PDW BLD-RTO: 17.9 % (ref 11.5–14.5)
PERFORMED ON: ABNORMAL
PERFORMED ON: NORMAL
PLATELET # BLD: 133 K/UL (ref 130–400)
PMV BLD AUTO: 9.4 FL (ref 9.4–12.3)
POTASSIUM REFLEX MAGNESIUM: 3.9 MMOL/L (ref 3.5–5)
RBC # BLD: 3.88 M/UL (ref 4.2–5.4)
SODIUM BLD-SCNC: 130 MMOL/L (ref 136–145)
WBC # BLD: 7.1 K/UL (ref 4.8–10.8)

## 2021-05-16 PROCEDURE — 82947 ASSAY GLUCOSE BLOOD QUANT: CPT

## 2021-05-16 PROCEDURE — 2580000003 HC RX 258: Performed by: STUDENT IN AN ORGANIZED HEALTH CARE EDUCATION/TRAINING PROGRAM

## 2021-05-16 PROCEDURE — 6360000002 HC RX W HCPCS: Performed by: STUDENT IN AN ORGANIZED HEALTH CARE EDUCATION/TRAINING PROGRAM

## 2021-05-16 PROCEDURE — 96372 THER/PROPH/DIAG INJ SC/IM: CPT

## 2021-05-16 PROCEDURE — 6370000000 HC RX 637 (ALT 250 FOR IP): Performed by: STUDENT IN AN ORGANIZED HEALTH CARE EDUCATION/TRAINING PROGRAM

## 2021-05-16 PROCEDURE — 36415 COLL VENOUS BLD VENIPUNCTURE: CPT

## 2021-05-16 PROCEDURE — 80048 BASIC METABOLIC PNL TOTAL CA: CPT

## 2021-05-16 PROCEDURE — 96374 THER/PROPH/DIAG INJ IV PUSH: CPT

## 2021-05-16 PROCEDURE — G0378 HOSPITAL OBSERVATION PER HR: HCPCS

## 2021-05-16 PROCEDURE — 85025 COMPLETE CBC W/AUTO DIFF WBC: CPT

## 2021-05-16 RX ORDER — NICOTINE POLACRILEX 4 MG
15 LOZENGE BUCCAL PRN
Status: DISCONTINUED | OUTPATIENT
Start: 2021-05-16 | End: 2021-05-16 | Stop reason: HOSPADM

## 2021-05-16 RX ORDER — DEXTROSE MONOHYDRATE 25 G/50ML
12.5 INJECTION, SOLUTION INTRAVENOUS PRN
Status: DISCONTINUED | OUTPATIENT
Start: 2021-05-16 | End: 2021-05-16 | Stop reason: HOSPADM

## 2021-05-16 RX ORDER — NYSTATIN 100000 [USP'U]/G
POWDER TOPICAL
Qty: 15 G | Refills: 0 | Status: SHIPPED | OUTPATIENT
Start: 2021-05-16 | End: 2021-05-16 | Stop reason: HOSPADM

## 2021-05-16 RX ORDER — DEXTROSE MONOHYDRATE 50 MG/ML
100 INJECTION, SOLUTION INTRAVENOUS PRN
Status: DISCONTINUED | OUTPATIENT
Start: 2021-05-16 | End: 2021-05-16 | Stop reason: HOSPADM

## 2021-05-16 RX ORDER — INSULIN GLARGINE 100 [IU]/ML
30 INJECTION, SOLUTION SUBCUTANEOUS NIGHTLY
Status: DISCONTINUED | OUTPATIENT
Start: 2021-05-16 | End: 2021-05-16 | Stop reason: HOSPADM

## 2021-05-16 RX ORDER — NYSTATIN 100000 U/G
CREAM TOPICAL
Qty: 15 G | Refills: 0 | Status: ON HOLD | OUTPATIENT
Start: 2021-05-16 | End: 2021-12-18 | Stop reason: HOSPADM

## 2021-05-16 RX ORDER — INSULIN ASPART 100 [IU]/ML
10 INJECTION, SOLUTION INTRAVENOUS; SUBCUTANEOUS 3 TIMES DAILY
Qty: 5 PEN | Refills: 3 | Status: SHIPPED | OUTPATIENT
Start: 2021-05-16 | End: 2022-01-10

## 2021-05-16 RX ORDER — INSULIN GLARGINE 100 [IU]/ML
18 INJECTION, SOLUTION SUBCUTANEOUS DAILY
Qty: 5 PEN | Refills: 1 | Status: ON HOLD | OUTPATIENT
Start: 2021-05-16 | End: 2021-05-30 | Stop reason: HOSPADM

## 2021-05-16 RX ADMIN — HYDRALAZINE HYDROCHLORIDE 75 MG: 25 TABLET, FILM COATED ORAL at 06:05

## 2021-05-16 RX ADMIN — CLONIDINE HYDROCHLORIDE 0.3 MG: 0.2 TABLET ORAL at 06:06

## 2021-05-16 RX ADMIN — DULOXETINE 60 MG: 60 CAPSULE, DELAYED RELEASE ORAL at 08:22

## 2021-05-16 RX ADMIN — SEVELAMER CARBONATE 800 MG: 800 TABLET, FILM COATED ORAL at 08:25

## 2021-05-16 RX ADMIN — LEVOTHYROXINE SODIUM 150 MCG: 50 TABLET ORAL at 06:05

## 2021-05-16 RX ADMIN — METOPROLOL SUCCINATE 100 MG: 50 TABLET, EXTENDED RELEASE ORAL at 06:05

## 2021-05-16 RX ADMIN — ATORVASTATIN CALCIUM 20 MG: 20 TABLET, FILM COATED ORAL at 08:22

## 2021-05-16 RX ADMIN — LACTULOSE 20 G: 10 SOLUTION ORAL at 08:22

## 2021-05-16 RX ADMIN — INSULIN LISPRO 4 UNITS: 100 INJECTION, SOLUTION INTRAVENOUS; SUBCUTANEOUS at 01:52

## 2021-05-16 RX ADMIN — HYDROCHLOROTHIAZIDE 50 MG: 25 TABLET ORAL at 08:22

## 2021-05-16 RX ADMIN — NYSTATIN 500000 UNITS: 100000 SUSPENSION ORAL at 08:25

## 2021-05-16 RX ADMIN — INSULIN GLARGINE 20 UNITS: 100 INJECTION, SOLUTION SUBCUTANEOUS at 01:44

## 2021-05-16 RX ADMIN — LISINOPRIL 20 MG: 20 TABLET ORAL at 08:25

## 2021-05-16 RX ADMIN — HEPARIN SODIUM 5000 UNITS: 5000 INJECTION INTRAVENOUS; SUBCUTANEOUS at 06:06

## 2021-05-16 RX ADMIN — ONDANSETRON 4 MG: 2 INJECTION INTRAMUSCULAR; INTRAVENOUS at 09:38

## 2021-05-16 RX ADMIN — SENNOSIDES 8.6 MG: 8.6 TABLET, FILM COATED ORAL at 08:22

## 2021-05-16 RX ADMIN — NIFEDIPINE 60 MG: 60 TABLET, EXTENDED RELEASE ORAL at 08:22

## 2021-05-16 RX ADMIN — SODIUM CHLORIDE, PRESERVATIVE FREE 10 ML: 5 INJECTION INTRAVENOUS at 08:22

## 2021-05-16 RX ADMIN — ISOSORBIDE MONONITRATE 120 MG: 60 TABLET, EXTENDED RELEASE ORAL at 08:22

## 2021-05-16 NOTE — DISCHARGE SUMMARY
electrolytes. Patient improved and stable for discharge on 5/16 with prescription for nystatin swish and swallow and counseled on need for compliance with her prescribed basal and prandial insulin. Provided prescription refills for her insulin. Patient verbalized understanding. Follow-up with PCP within 2 to 3 days. Physical Exam:  Vital Signs: BP (!) 198/84 Comment: manual  Pulse 73   Temp 96.5 °F (35.8 °C) (Temporal)   Resp 18   Ht 5' 6\" (1.676 m)   Wt 152 lb (68.9 kg)   SpO2 100%   BMI 24.53 kg/m²   General appearance:. Alert and Cooperative, chronically ill-appearing  HEENT: Normocephalic. Atraumatic, oral thrush noted  Chest: clear to auscultation bilaterally without wheezes or rhonchi. Cardiac: Normal heart tones with regular rate and rhythm, S1, S2 normal. No murmurs, gallops, or rubs auscultated. Abdomen:soft, non-tender; normal bowel sounds  Extremities: No clubbing or cyanosis. Peripheral pulses palpable. Neurologic: Grossly intact.     Discharge Medications:       Kathaleen Dance   Home Medication Instructions [id number]    Printed on:05/16/21 5010   Medication Information                      albuterol sulfate  (90 Base) MCG/ACT inhaler  Inhale 2 puffs into the lungs every 4 hours as needed             cloNIDine (CATAPRES) 0.3 MG tablet  Take 1 tablet by mouth every 8 hours             DULoxetine (CYMBALTA) 60 MG extended release capsule  Take 60 mg by mouth daily             Ergocalciferol (VITAMIN D2) 10 MCG (400 UNIT) TABS  Take 1.25 mg by mouth every 14 days             hydrALAZINE (APRESOLINE) 50 MG tablet  Take 1.5 tablets by mouth every 8 hours             hydroCHLOROthiazide (HYDRODIURIL) 25 MG tablet  Take 2 tablets by mouth daily             insulin aspart (NOVOLOG FLEXPEN) 100 UNIT/ML injection pen  Inject 10 Units into the skin 3 times daily Indications: Diabetes             insulin glargine (LANTUS SOLOSTAR) 100 UNIT/ML injection pen  Inject 18 Units into the skin daily Indications: sliding scale             isosorbide mononitrate (IMDUR) 120 MG extended release tablet  Take 1 tablet by mouth daily             lactulose (CHRONULAC) 10 GM/15ML solution  Take 30 mLs by mouth 3 times daily             levothyroxine (SYNTHROID) 150 MCG tablet  Take 150 mcg by mouth Daily             lisinopril (PRINIVIL;ZESTRIL) 20 MG tablet  Take 1 tablet by mouth 2 times daily             metoprolol succinate (TOPROL XL) 100 MG extended release tablet  Take 1 tablet by mouth every morning (before breakfast)             NIFEdipine (ADALAT CC) 60 MG extended release tablet  Take 60 mg by mouth daily             nystatin (MYCOSTATIN) 893417 UNIT/GM cream  Apply topically 2 times daily. nystatin (MYCOSTATIN) 015202 UNIT/ML suspension  Take 5 mLs by mouth 4 times daily             rOPINIRole (REQUIP) 2 MG tablet  Take 4 mg by mouth nightly Indications: Restless Leg Syndrome              senna (SENNA-LAX) 8.6 MG tablet  Take 1 tablet by mouth daily             sevelamer (RENVELA) 800 MG tablet  Take 1 tablet by mouth 3 times daily (with meals)             simvastatin (ZOCOR) 40 MG tablet  Take 40 mg by mouth nightly                  Discharge Instructions: Follow up with ROMINA Dejesus in 2-3 days. Take medications as directed. Resume activity as tolerated. Diet: DIET RENAL; Carb Control: 3 carb choices (45 gms)/meal; Renal     Disposition: Patient is medically stable and will be discharged home. Time spent on discharge 37 minutes.       Signed:  Vianca Miller MD  5/16/2021 8:08 AM

## 2021-05-16 NOTE — PROGRESS NOTES
Nephrology ([facility]) Progress Note      Patient:  Mikael Martinez  YOB: 1970  Date of Service: 5/16/2021  MRN: [id number]   Acct: [de-identified]   Primary Care Physician: ROMINA Szymanski  Advance Directive: Full Code  Admit Date: 5/15/2021       Hospital Day: 1  Referring Provider: Brooklyn Wood MD    Patient independently seen and examined, Chart, Consults, Notes, Operative notes, Labs, Cardiology, and Radiology studies reviewed as available. Chief complaint: Profound weakness/ESRD/severe hyperglycemia. Subjective:  Mikael Martinez is a 45 y.o. female  whom we were consulted for end-stage renal disease. Presented to the emergency room as she has severe oral thrush. Patient has end-stage renal disease, goes to [facility] dialysis on Tuesday Thursday Saturday. She decided to come to the emergency room as she was not feeling well. Her blood sugar was more than 1000. Now admitted for the treatment of severe hyperglycemia/oral thrush/need for dialysis. Routine lab data-showed serum sodium is 113. On May 15, patient underwent emergent hemodialysis treatment for correction of hyponatremia and volume status. Her serum sodium has improved so as her blood sugar. This morning she wants to go home.     Allergies:  Penicillins, Lamisil advanced [terbinafine], Stadol [butorphanol], and Reglan [metoclopramide]    Medicines:  Current Facility-Administered Medications   Medication Dose Route Frequency Provider Last Rate Last Admin    insulin glargine (LANTUS) injection vial 30 Units  30 Units Subcutaneous Nightly Brooklyn Wood MD        glucose (GLUTOSE) 40 % oral gel 15 g  15 g Oral PRN Brooklyn Wood MD        dextrose 50 % IV solution  12.5 g Intravenous PRN Brooklyn Wood MD        glucagon (rDNA) injection 1 mg  1 mg Intramuscular PRN Brooklyn Wood MD        dextrose 5 % solution  100 mL/hr Intravenous PRN Brooklyn Wood MD        insulin lispro (HUMALOG) injection vial 0-12 Units  0-12 Units Subcutaneous Q6H Sherie Duverney, MD        ipratropium-albuterol (DUONEB) nebulizer solution 1 ampule  1 ampule Inhalation Q4H PRN Sherie Duverney, MD        cloNIDine (CATAPRES) tablet 0.3 mg  0.3 mg Oral 3 times per day Sherie Duverney, MD   0.3 mg at 05/16/21 0606    DULoxetine (CYMBALTA) extended release capsule 60 mg  60 mg Oral Daily Sherie Duverney, MD   60 mg at 05/16/21 8816    vitamin D (ERGOCALCIFEROL) capsule 50,000 Units  50,000 Units Oral Q14 Days Sherie Duverney, MD   50,000 Units at 05/15/21 2234    hydrALAZINE (APRESOLINE) tablet 75 mg  75 mg Oral 3 times per day Sherie Duverney, MD   75 mg at 05/16/21 8263    hydroCHLOROthiazide (HYDRODIURIL) tablet 50 mg  50 mg Oral Daily Sherie Duverney, MD   50 mg at 05/16/21 1327    insulin lispro (HUMALOG) injection vial 10 Units  10 Units Subcutaneous TID WC Sherie Duverney, MD        isosorbide mononitrate (IMDUR) extended release tablet 120 mg  120 mg Oral Daily Sherie Duverney, MD   120 mg at 05/16/21 6126    lactulose (CHRONULAC) 10 GM/15ML solution 20 g  20 g Oral TID Sherie Duverney, MD   20 g at 05/16/21 0050    levothyroxine (SYNTHROID) tablet 150 mcg  150 mcg Oral Daily Sherie Duverney, MD   150 mcg at 05/16/21 3157    lisinopril (PRINIVIL;ZESTRIL) tablet 20 mg  20 mg Oral BID Sherie Duverney, MD   20 mg at 05/16/21 0825    metoprolol succinate (TOPROL XL) extended release tablet 100 mg  100 mg Oral QAM AC Sherie Duverney, MD   100 mg at 05/16/21 1829    NIFEdipine (PROCARDIA XL) extended release tablet 60 mg  60 mg Oral Daily Sherie Duverney, MD   60 mg at 05/16/21 7941    rOPINIRole (REQUIP) tablet 4 mg  4 mg Oral Nightly Sherie Duverney, MD   4 mg at 05/15/21 2235    senna (SENOKOT) tablet 8.6 mg  1 tablet Oral Daily Sherie Duverney, MD   8.6 mg at 05/16/21 3089    sevelamer (RENVELA) tablet 800 mg  800 mg Oral TID WC Sherie Duverney, MD   800 mg at 05/16/21 0802    atorvastatin 2008    infected milk gland removed    CHOLECYSTECTOMY, LAPAROSCOPIC      2008    COLONOSCOPY Left 9/17/2020    Dr Mami Hartman hepatic flexure-Severe tortuosity with severe spasming, 1 yr recall    DIALYSIS FISTULA CREATION Left 1/25/2019    REVISION LEFT UPPER EXTREMITY AV ACCESS WITH LEFT BRACHIAL ARTERY TO LEFT AXILLARY VEIN WITH  INTERPOSITIONAL ARTEGRAFT   performed by Dylon Solis MD at [address]  2012    [address] Right 1/25/2019    INSERTION OF RIGHT INTERNAL JUGULAR HEMODIALYSIS CATHETER performed by Dylon Solis MD at [address] [facility]  08/17/2018    1.  Moderately increased stainable iron identified by special stain in Kupffer cells and occasional hepatocytes. Negative for evidence of significant portal or lobular inflammation. Negative for evidence of significant fibrosis    [place] COLONOSCOPY W/BIOPSY SINGLE/MULTIPLE N/A 10/20/2017    Dr Raúl Castellanos prep-Tubular AP (-) dysplasia x 2--3 yr recall    MN EGD TRANSORAL BIOPSY SINGLE/MULTIPLE N/A 12/27/2016    Dr Sergei Barron EGD TRANSORAL BIOPSY SINGLE/MULTIPLE N/A 10/20/2017    Dr Chapin Dino (-)   [facility] VASCULAR SURGERY Left 2016    fistula by Dr Rayne Mdadox at [address]  10/02/2017    [facility]. Left upper fistulograms/venograms, balloon angioplasty cephalic vein arch [id number] conquest.    VASCULAR SURGERY  08/2018    FISTULOGRAM    VASCULAR SURGERY  10/29/2018    SJS. Left upper fistulograms/venograms    VASCULAR SURGERY  12/19/2018    SJS. Arch aortogram,left upper arteriograms.  VASCULAR SURGERY  03/06/2019    SJS. Removal of tunneled dialyis catheter right internal jugular vein.  VASCULAR SURGERY  08/28/2019    SJS. Left upper extremity fistulogram including venography to the superior vena cava. Balloon angioplasty left subclavian vein with 10mm x 40mm conquest balloon. Balloon angioplasty mid/proximal upper arm cephalic vein with 25BB x 40mm conquest balloon. Venograms Social Connections:     Frequency of Communication with Friends and Family:     Frequency of Social Gatherings with Friends and Family:     Attends Denominational Services:     Active Member of Clubs or Organizations:     Attends Club or Organization Meetings:     Marital Status:    Intimate Partner Violence:     Fear of Current or Ex-Partner:     Emotionally Abused:     Physically Abused:     Sexually Abused:          Review of Systems:  History obtained from chart review and the patient  General ROS: No fever or chills  Respiratory ROS: No cough, shortness of breath, wheezing  Cardiovascular ROS: No chest pain or palpitations  Gastrointestinal ROS: No abdominal pain or melena  Genito-Urinary ROS: No dysuria or hematuria  Musculoskeletal ROS: No joint pain or swelling   14 point ROS reviewed with the patient and negative except as noted above and in the HPI unless unable to obtain. Objective:  Patient Vitals for the past 24 hrs:   BP Temp Temp src Pulse Resp SpO2   05/16/21 0800 -- -- -- 74 -- --   05/16/21 0736 (!) 198/84 -- -- -- -- --   05/16/21 0735 (!) 178/80 96.5 °F (35.8 °C) Temporal 73 18 100 %   05/16/21 0302 (!) 162/80 -- -- -- -- --   05/16/21 0219 (!) 188/90 97.9 °F (36.6 °C) Temporal 79 18 96 %   05/15/21 1930 (!) 178/79 98.5 °F (36.9 °C) Temporal 86 18 97 %   05/15/21 1400 (!) 154/74 -- -- 85 -- --       Intake/Output Summary (Last 24 hours) at 5/16/2021 1136  Last data filed at 5/16/2021 8406  Gross per 24 hour   Intake 480 ml   Output 5000 ml   Net -4520 ml     General: awake/alert   HEENT: Normocephalic atraumatic  Neck: Supple with no JVD or carotid bruits.   Chest:  clear to auscultation bilaterally  CVS: regular rate and rhythm  Abdominal: soft, nontender, normal bowel sounds  Extremities: no cyanosis or edema  Skin: warm and dry without rash      Labs:  BMP:   Recent Labs     05/15/21  0729 05/15/21  1345 05/16/21  0125   * 131* 130*   K 4.3 3.2* 3.9   CL 77* 93* 92*   CO2 21* 26

## 2021-05-16 NOTE — PROGRESS NOTES
Pt discharged home at 1233. Left in private vehicle driven by neighbor. Prescriptions called into [facility] pharmacy. Patient verbalizes understanding of discharge instructions including medications and need to call and schedule a PCP appointment.     Electronically signed by Ochoa Skelton RN on 5/16/2021 at 12:39 PM

## 2021-05-16 NOTE — PLAN OF CARE
Problem: Skin Integrity:  Goal: Will show no infection signs and symptoms  Description: Will show no infection signs and symptoms  Outcome: Completed  Goal: Absence of new skin breakdown  Description: Absence of new skin breakdown  Outcome: Completed     Problem: Falls - Risk of:  Goal: Will remain free from falls  Description: Will remain free from falls  Outcome: Completed  Goal: Absence of physical injury  Description: Absence of physical injury  Outcome: Completed     Problem: Discharge Planning:  Goal: Discharged to appropriate level of care  Description: Discharged to appropriate level of care  Outcome: Completed     Problem: Serum Glucose Level - Abnormal:  Goal: Ability to maintain appropriate glucose levels will improve  Description: Ability to maintain appropriate glucose levels will improve  Outcome: Completed     Problem: Sensory Perception - Impaired:  Goal: Ability to maintain a stable neurologic state will improve  Description: Ability to maintain a stable neurologic state will improve  Outcome: Completed

## 2021-05-17 ENCOUNTER — TELEPHONE (OUTPATIENT)
Dept: SURGERY | Age: 51
End: 2021-05-17

## 2021-05-21 ENCOUNTER — HOSPITAL ENCOUNTER (OUTPATIENT)
Dept: PREADMISSION TESTING | Age: 51
Discharge: HOME | End: 2021-05-25
Payer: MEDICARE

## 2021-05-21 LAB — SARS-COV-2, PCR: NOT DETECTED

## 2021-05-21 PROCEDURE — U0003 INFECTIOUS AGENT DETECTION BY NUCLEIC ACID (DNA OR RNA); SEVERE ACUTE RESPIRATORY SYNDROME CORONAVIRUS 2 (SARS-COV-2) (CORONAVIRUS DISEASE [COVID-19]), AMPLIFIED PROBE TECHNIQUE, MAKING USE OF HIGH THROUGHPUT TECHNOLOGIES AS DESCRIBED BY CMS-2020-01-R: HCPCS

## 2021-05-21 PROCEDURE — U0005 INFEC AGEN DETEC AMPLI PROBE: HCPCS

## 2021-05-21 RX ORDER — INSULIN GLARGINE 100 [IU]/ML
70 INJECTION, SOLUTION SUBCUTANEOUS NIGHTLY
Status: ON HOLD | COMMUNITY
End: 2022-01-05 | Stop reason: SDUPTHER

## 2021-05-25 ENCOUNTER — HOSPITAL ENCOUNTER (OUTPATIENT)
Age: 51
Setting detail: OUTPATIENT SURGERY
Discharge: HOME | End: 2021-05-25
Attending: SURGERY | Admitting: SURGERY
Payer: MEDICARE

## 2021-05-25 VITALS
WEIGHT: 140.65 LBS | SYSTOLIC BLOOD PRESSURE: 173 MMHG | RESPIRATION RATE: 18 BRPM | DIASTOLIC BLOOD PRESSURE: 77 MMHG | BODY MASS INDEX: 22.6 KG/M2 | OXYGEN SATURATION: 95 % | TEMPERATURE: 98.6 F | HEIGHT: 66 IN | HEART RATE: 70 BPM

## 2021-05-25 VITALS
SYSTOLIC BLOOD PRESSURE: 128 MMHG | TEMPERATURE: 97 F | RESPIRATION RATE: 13 BRPM | OXYGEN SATURATION: 93 % | DIASTOLIC BLOOD PRESSURE: 58 MMHG

## 2021-05-25 DIAGNOSIS — N64.52 DISCHARGE FROM NIPPLE: Primary | ICD-10-CM

## 2021-05-25 LAB
ANION GAP SERPL CALCULATED.3IONS-SCNC: 16 MMOL/L (ref 7–19)
APTT: 26 SEC (ref 26–36.2)
BUN BLDV-MCNC: 46 MG/DL (ref 6–20)
CALCIUM SERPL-MCNC: 8.9 MG/DL (ref 8.6–10)
CHLORIDE BLD-SCNC: 89 MMOL/L (ref 98–111)
CO2: 25 MMOL/L (ref 22–29)
CREAT SERPL-MCNC: 3.9 MG/DL (ref 0.5–0.9)
GFR AFRICAN AMERICAN: 15
GFR NON-AFRICAN AMERICAN: 12
GLUCOSE BLD-MCNC: 317 MG/DL (ref 74–109)
HCT VFR BLD CALC: 35.2 % (ref 37–47)
HEMOGLOBIN: 11.1 G/DL (ref 12–16)
INR BLD: 0.99 (ref 0.88–1.18)
MCH RBC QN AUTO: 29.8 PG (ref 27–31)
MCHC RBC AUTO-ENTMCNC: 31.5 G/DL (ref 33–37)
MCV RBC AUTO: 94.4 FL (ref 81–99)
PDW BLD-RTO: 17.6 % (ref 11.5–14.5)
PLATELET # BLD: 122 K/UL (ref 130–400)
PMV BLD AUTO: 9.2 FL (ref 9.4–12.3)
POTASSIUM SERPL-SCNC: 5.3 MMOL/L (ref 3.5–4.9)
PROTHROMBIN TIME: 13 SEC (ref 12–14.6)
RBC # BLD: 3.73 M/UL (ref 4.2–5.4)
SODIUM BLD-SCNC: 130 MMOL/L (ref 136–145)
WBC # BLD: 8.5 K/UL (ref 4.8–10.8)

## 2021-05-25 PROCEDURE — 88329 PATH CONSLTJ DRG SURG: CPT

## 2021-05-25 PROCEDURE — 6360000002 HC RX W HCPCS: Performed by: SURGERY

## 2021-05-25 PROCEDURE — 7100000010 HC PHASE II RECOVERY - FIRST 15 MIN: Performed by: SURGERY

## 2021-05-25 PROCEDURE — 85027 COMPLETE CBC AUTOMATED: CPT

## 2021-05-25 PROCEDURE — 2580000003 HC RX 258: Performed by: SURGERY

## 2021-05-25 PROCEDURE — 85730 THROMBOPLASTIN TIME PARTIAL: CPT

## 2021-05-25 PROCEDURE — 19285 PERQ DEV BREAST 1ST US IMAG: CPT | Performed by: SURGERY

## 2021-05-25 PROCEDURE — 2709999900 HC NON-CHARGEABLE SUPPLY: Performed by: SURGERY

## 2021-05-25 PROCEDURE — 3700000001 HC ADD 15 MINUTES (ANESTHESIA): Performed by: SURGERY

## 2021-05-25 PROCEDURE — 88305 TISSUE EXAM BY PATHOLOGIST: CPT

## 2021-05-25 PROCEDURE — 64421 NJX AA&/STRD NTRCOST NRV EA: CPT | Performed by: SURGERY

## 2021-05-25 PROCEDURE — 3600000014 HC SURGERY LEVEL 4 ADDTL 15MIN: Performed by: SURGERY

## 2021-05-25 PROCEDURE — 2500000003 HC RX 250 WO HCPCS: Performed by: NURSE ANESTHETIST, CERTIFIED REGISTERED

## 2021-05-25 PROCEDURE — 6370000000 HC RX 637 (ALT 250 FOR IP): Performed by: ANESTHESIOLOGY

## 2021-05-25 PROCEDURE — 2580000003 HC RX 258: Performed by: ANESTHESIOLOGY

## 2021-05-25 PROCEDURE — 36415 COLL VENOUS BLD VENIPUNCTURE: CPT

## 2021-05-25 PROCEDURE — 3600000004 HC SURGERY LEVEL 4 BASE: Performed by: SURGERY

## 2021-05-25 PROCEDURE — 85610 PROTHROMBIN TIME: CPT

## 2021-05-25 PROCEDURE — 3700000000 HC ANESTHESIA ATTENDED CARE: Performed by: SURGERY

## 2021-05-25 PROCEDURE — 6360000002 HC RX W HCPCS: Performed by: NURSE ANESTHETIST, CERTIFIED REGISTERED

## 2021-05-25 PROCEDURE — 7100000001 HC PACU RECOVERY - ADDTL 15 MIN: Performed by: SURGERY

## 2021-05-25 PROCEDURE — 7100000011 HC PHASE II RECOVERY - ADDTL 15 MIN: Performed by: SURGERY

## 2021-05-25 PROCEDURE — 7100000000 HC PACU RECOVERY - FIRST 15 MIN: Performed by: SURGERY

## 2021-05-25 PROCEDURE — 6370000000 HC RX 637 (ALT 250 FOR IP): Performed by: SURGERY

## 2021-05-25 PROCEDURE — 2580000003 HC RX 258: Performed by: NURSE ANESTHETIST, CERTIFIED REGISTERED

## 2021-05-25 PROCEDURE — 19125 EXCISION BREAST LESION: CPT | Performed by: SURGERY

## 2021-05-25 PROCEDURE — 80048 BASIC METABOLIC PNL TOTAL CA: CPT

## 2021-05-25 RX ORDER — LIDOCAINE HYDROCHLORIDE 10 MG/ML
1 INJECTION, SOLUTION EPIDURAL; INFILTRATION; INTRACAUDAL; PERINEURAL
Status: DISCONTINUED | OUTPATIENT
Start: 2021-05-25 | End: 2021-05-25 | Stop reason: HOSPADM

## 2021-05-25 RX ORDER — FENTANYL CITRATE 50 UG/ML
50 INJECTION, SOLUTION INTRAMUSCULAR; INTRAVENOUS
Status: DISCONTINUED | OUTPATIENT
Start: 2021-05-25 | End: 2021-05-25 | Stop reason: HOSPADM

## 2021-05-25 RX ORDER — HYDROCODONE BITARTRATE AND ACETAMINOPHEN 5; 325 MG/1; MG/1
1 TABLET ORAL EVERY 6 HOURS PRN
Qty: 10 TABLET | Refills: 0 | Status: ON HOLD | OUTPATIENT
Start: 2021-05-25 | End: 2021-12-22 | Stop reason: SDUPTHER

## 2021-05-25 RX ORDER — HYDRALAZINE HYDROCHLORIDE 20 MG/ML
5 INJECTION INTRAMUSCULAR; INTRAVENOUS EVERY 10 MIN PRN
Status: DISCONTINUED | OUTPATIENT
Start: 2021-05-25 | End: 2021-05-25 | Stop reason: HOSPADM

## 2021-05-25 RX ORDER — MIDAZOLAM HYDROCHLORIDE 1 MG/ML
INJECTION INTRAMUSCULAR; INTRAVENOUS PRN
Status: DISCONTINUED | OUTPATIENT
Start: 2021-05-25 | End: 2021-05-25 | Stop reason: SDUPTHER

## 2021-05-25 RX ORDER — FENTANYL CITRATE 50 UG/ML
INJECTION, SOLUTION INTRAMUSCULAR; INTRAVENOUS PRN
Status: DISCONTINUED | OUTPATIENT
Start: 2021-05-25 | End: 2021-05-25 | Stop reason: SDUPTHER

## 2021-05-25 RX ORDER — SODIUM CHLORIDE 9 MG/ML
25 INJECTION, SOLUTION INTRAVENOUS PRN
Status: DISCONTINUED | OUTPATIENT
Start: 2021-05-25 | End: 2021-05-25 | Stop reason: HOSPADM

## 2021-05-25 RX ORDER — SODIUM CHLORIDE, SODIUM LACTATE, POTASSIUM CHLORIDE, CALCIUM CHLORIDE 600; 310; 30; 20 MG/100ML; MG/100ML; MG/100ML; MG/100ML
INJECTION, SOLUTION INTRAVENOUS CONTINUOUS
Status: DISCONTINUED | OUTPATIENT
Start: 2021-05-25 | End: 2021-05-25 | Stop reason: HOSPADM

## 2021-05-25 RX ORDER — MORPHINE SULFATE 4 MG/ML
4 INJECTION, SOLUTION INTRAMUSCULAR; INTRAVENOUS EVERY 5 MIN PRN
Status: DISCONTINUED | OUTPATIENT
Start: 2021-05-25 | End: 2021-05-25 | Stop reason: HOSPADM

## 2021-05-25 RX ORDER — SODIUM CHLORIDE 450 MG/100ML
INJECTION, SOLUTION INTRAVENOUS CONTINUOUS PRN
Status: DISCONTINUED | OUTPATIENT
Start: 2021-05-25 | End: 2021-05-25 | Stop reason: SDUPTHER

## 2021-05-25 RX ORDER — PROMETHAZINE HYDROCHLORIDE 25 MG/ML
6.25 INJECTION, SOLUTION INTRAMUSCULAR; INTRAVENOUS
Status: DISCONTINUED | OUTPATIENT
Start: 2021-05-25 | End: 2021-05-25 | Stop reason: HOSPADM

## 2021-05-25 RX ORDER — DIPHENHYDRAMINE HYDROCHLORIDE 50 MG/ML
12.5 INJECTION INTRAMUSCULAR; INTRAVENOUS
Status: DISCONTINUED | OUTPATIENT
Start: 2021-05-25 | End: 2021-05-25 | Stop reason: HOSPADM

## 2021-05-25 RX ORDER — HYDROMORPHONE HYDROCHLORIDE 1 MG/ML
0.5 INJECTION, SOLUTION INTRAMUSCULAR; INTRAVENOUS; SUBCUTANEOUS EVERY 5 MIN PRN
Status: DISCONTINUED | OUTPATIENT
Start: 2021-05-25 | End: 2021-05-25 | Stop reason: HOSPADM

## 2021-05-25 RX ORDER — SCOLOPAMINE TRANSDERMAL SYSTEM 1 MG/1
1 PATCH, EXTENDED RELEASE TRANSDERMAL ONCE
Status: DISCONTINUED | OUTPATIENT
Start: 2021-05-25 | End: 2021-05-25 | Stop reason: HOSPADM

## 2021-05-25 RX ORDER — MIDAZOLAM HYDROCHLORIDE 1 MG/ML
2 INJECTION INTRAMUSCULAR; INTRAVENOUS
Status: DISCONTINUED | OUTPATIENT
Start: 2021-05-25 | End: 2021-05-25 | Stop reason: HOSPADM

## 2021-05-25 RX ORDER — MORPHINE SULFATE 4 MG/ML
2 INJECTION, SOLUTION INTRAMUSCULAR; INTRAVENOUS EVERY 5 MIN PRN
Status: DISCONTINUED | OUTPATIENT
Start: 2021-05-25 | End: 2021-05-25 | Stop reason: HOSPADM

## 2021-05-25 RX ORDER — LABETALOL HYDROCHLORIDE 5 MG/ML
5 INJECTION, SOLUTION INTRAVENOUS EVERY 10 MIN PRN
Status: DISCONTINUED | OUTPATIENT
Start: 2021-05-25 | End: 2021-05-25 | Stop reason: HOSPADM

## 2021-05-25 RX ORDER — SODIUM CHLORIDE 0.9 % (FLUSH) 0.9 %
5-40 SYRINGE (ML) INJECTION EVERY 12 HOURS SCHEDULED
Status: DISCONTINUED | OUTPATIENT
Start: 2021-05-25 | End: 2021-05-25 | Stop reason: HOSPADM

## 2021-05-25 RX ORDER — DEXMEDETOMIDINE HYDROCHLORIDE 100 UG/ML
INJECTION, SOLUTION INTRAVENOUS PRN
Status: DISCONTINUED | OUTPATIENT
Start: 2021-05-25 | End: 2021-05-25 | Stop reason: SDUPTHER

## 2021-05-25 RX ORDER — GABAPENTIN 300 MG/1
300 CAPSULE ORAL ONCE
Status: COMPLETED | OUTPATIENT
Start: 2021-05-25 | End: 2021-05-25

## 2021-05-25 RX ORDER — MORPHINE SULFATE 4 MG/ML
4 INJECTION, SOLUTION INTRAMUSCULAR; INTRAVENOUS
Status: DISCONTINUED | OUTPATIENT
Start: 2021-05-25 | End: 2021-05-25 | Stop reason: HOSPADM

## 2021-05-25 RX ORDER — ACETAMINOPHEN 325 MG/1
975 TABLET ORAL ONCE
Status: COMPLETED | OUTPATIENT
Start: 2021-05-25 | End: 2021-05-25

## 2021-05-25 RX ORDER — CELECOXIB 200 MG/1
200 CAPSULE ORAL ONCE
Status: DISCONTINUED | OUTPATIENT
Start: 2021-05-25 | End: 2021-05-25

## 2021-05-25 RX ORDER — HYDROMORPHONE HYDROCHLORIDE 1 MG/ML
0.25 INJECTION, SOLUTION INTRAMUSCULAR; INTRAVENOUS; SUBCUTANEOUS EVERY 5 MIN PRN
Status: DISCONTINUED | OUTPATIENT
Start: 2021-05-25 | End: 2021-05-25 | Stop reason: HOSPADM

## 2021-05-25 RX ORDER — PROPOFOL 10 MG/ML
INJECTION, EMULSION INTRAVENOUS CONTINUOUS PRN
Status: DISCONTINUED | OUTPATIENT
Start: 2021-05-25 | End: 2021-05-25 | Stop reason: SDUPTHER

## 2021-05-25 RX ORDER — MEPERIDINE HYDROCHLORIDE 50 MG/ML
12.5 INJECTION INTRAMUSCULAR; INTRAVENOUS; SUBCUTANEOUS EVERY 5 MIN PRN
Status: DISCONTINUED | OUTPATIENT
Start: 2021-05-25 | End: 2021-05-25 | Stop reason: HOSPADM

## 2021-05-25 RX ORDER — SODIUM CHLORIDE 0.9 % (FLUSH) 0.9 %
5-40 SYRINGE (ML) INJECTION PRN
Status: DISCONTINUED | OUTPATIENT
Start: 2021-05-25 | End: 2021-05-25 | Stop reason: HOSPADM

## 2021-05-25 RX ADMIN — DEXMEDETOMIDINE HYDROCHLORIDE 5 MCG: 100 INJECTION, SOLUTION, CONCENTRATE INTRAVENOUS at 12:18

## 2021-05-25 RX ADMIN — INSULIN HUMAN 6 UNITS: 100 INJECTION, SOLUTION PARENTERAL at 11:05

## 2021-05-25 RX ADMIN — ACETAMINOPHEN 975 MG: 325 TABLET ORAL at 10:23

## 2021-05-25 RX ADMIN — DEXMEDETOMIDINE HYDROCHLORIDE 10 MCG: 100 INJECTION, SOLUTION, CONCENTRATE INTRAVENOUS at 12:03

## 2021-05-25 RX ADMIN — MIDAZOLAM 2 MG: 1 INJECTION INTRAMUSCULAR; INTRAVENOUS at 11:58

## 2021-05-25 RX ADMIN — PROPOFOL 140 MCG/KG/MIN: 10 INJECTION, EMULSION INTRAVENOUS at 12:01

## 2021-05-25 RX ADMIN — Medication 1000 MG: at 11:16

## 2021-05-25 RX ADMIN — FENTANYL CITRATE 50 MCG: 50 INJECTION, SOLUTION INTRAMUSCULAR; INTRAVENOUS at 12:06

## 2021-05-25 RX ADMIN — GABAPENTIN 300 MG: 300 CAPSULE ORAL at 10:23

## 2021-05-25 RX ADMIN — SODIUM CHLORIDE: 4.5 INJECTION, SOLUTION INTRAVENOUS at 11:59

## 2021-05-25 ASSESSMENT — PAIN - FUNCTIONAL ASSESSMENT: PAIN_FUNCTIONAL_ASSESSMENT: 0-10

## 2021-05-25 ASSESSMENT — PAIN SCALES - GENERAL
PAINLEVEL_OUTOF10: 0

## 2021-05-25 ASSESSMENT — LIFESTYLE VARIABLES: SMOKING_STATUS: 0

## 2021-05-25 ASSESSMENT — ENCOUNTER SYMPTOMS: SHORTNESS OF BREATH: 0

## 2021-05-25 NOTE — BRIEF OP NOTE
Brief Postoperative Note      DATE OF PROCEDURE: 5/25/2021     SURGEON: Spring Gaines MD    PREOPERATIVE DIAGNOSIS:  N64.52    POSTOPERATIVE DIAGNOSIS: Same     OPERATION: Procedure(s):  WEDGE EXCISION RIGHT BREAST DUCT WITH LACRIMAL DUCT PROBE, PEC BLOCK    ANESTHESIA: Monitor Anesthesia Care    ESTIMATED BLOOD LOSS: Minimal    COMPLICATIONS: None. SPECIMENS:   ID Type Source Tests Collected by Time Destination   A : right breast duct Tissue Breast SURGICAL PATHOLOGY Spring Gaines MD 5/25/2021 1228        DRAINS: None    The patient tolerated the procedure well.     Electronically signed by Spring Gaines MD  on 5/25/2021 at 12:50 PM

## 2021-05-25 NOTE — H&P
Expand AllCollapse All    HISTORY OF PRESENT ILLNESS:     Ms. Lara Schultz is a 45 y.o.  female who presents today for a 6 week follow up Unilateral ultrasound from bloody discharge from her right breast nipple. She seen Christiane Broussard and was placed on doxycycline.         She has a history of tenderness and purulent drainage from her nipple. She also several years ago had to have a duct excised on the left for something similar that was done in [place].     She has a family history of breast cancer in her sister at the age of 27     Unilateral Right Ultrasound-4/5/2021  FINDINGS:  This compared prior study February 4, 2021. Again multiple ducts are visualized. Echogenic contents present in the   ducts. 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 Impression multiple filling defects in the dilated ducts in the   retroareolar region.  This is most consistent with papillomatosis   Signed by Dr Claire Diaz on 4/5/2021 4:23 PM      Darryl Uriarte is a 45 y.o. female with the following history as recorded in [facility]:       Patient Active Problem List     Diagnosis Date Noted    Discharge from nipple 04/14/2021    Left leg pain 02/24/2021    Left leg swelling 02/24/2021    Unable to ambulate 02/24/2021    Sacroiliitis ([address] 01/13/2021    Lumbar radiculopathy 01/13/2021    DKA, type 2, not at goal [facility]) 10/21/2020    Hypertensive urgency 10/21/2020    Altered mental state 10/21/2020    Hyperosmolar syndrome 09/16/2020    Chronic epigastric pain 08/18/2020    Chronic nausea 08/18/2020    Chronic vomiting 08/18/2020    Abnormal CT of the abdomen 08/18/2020    Poor appetite 08/18/2020    Personal history of colonic polyps 08/18/2020    Hypertensive emergency 06/24/2020    Type 2 diabetes mellitus with hyperosmolar coma, with long-term current use of insulin ([address]      Seizure ([address] 10/08/2019    Palliative care patient 10/08/2019    Hyperglycemic hyperosmolar nonketotic coma ([address]      hydroCHLOROthiazide (HYDRODIURIL) 25 MG tablet Take 2 tablets by mouth daily 30 tablet 3    ondansetron (ZOFRAN-ODT) 4 MG disintegrating tablet Take 4 mg by mouth every 8 hours as needed for Nausea or Vomiting        Ergocalciferol (VITAMIN D2) 10 MCG (400 UNIT) TABS Take 1.25 mg by mouth every 14 days        senna (SENNA-LAX) 8.6 MG tablet Take 1 tablet by mouth daily        linaclotide (LINZESS) 290 MCG CAPS capsule Take 1 capsule by mouth every morning (before breakfast) 30 capsule 11    cloNIDine (CATAPRES) 0.3 MG tablet Take 1 tablet by mouth every 8 hours 60 tablet 3    sevelamer (RENVELA) 800 MG tablet Take 1 tablet by mouth 3 times daily (with meals)        lisinopril (PRINIVIL;ZESTRIL) 20 MG tablet Take 1 tablet by mouth 2 times daily 30 tablet 0    metoprolol succinate (TOPROL XL) 100 MG extended release tablet Take 1 tablet by mouth every morning (before breakfast) 30 tablet 0    albuterol sulfate  (90 Base) MCG/ACT inhaler Inhale 2 puffs into the lungs every 4 hours as needed        levothyroxine (SYNTHROID) 150 MCG tablet Take 150 mcg by mouth Daily        NIFEdipine (ADALAT CC) 60 MG extended release tablet Take 60 mg by mouth daily        isosorbide mononitrate (IMDUR) 120 MG extended release tablet Take 1 tablet by mouth daily 30 tablet 0    DULoxetine (CYMBALTA) 60 MG extended release capsule Take 60 mg by mouth daily        rOPINIRole (REQUIP) 2 MG tablet Take 4 mg by mouth nightly Indications: Restless Leg Syndrome         simvastatin (ZOCOR) 40 MG tablet Take 40 mg by mouth nightly         insulin aspart (NOVOLOG FLEXPEN) 100 UNIT/ML injection pen Inject 5 Units into the skin 3 times daily         nicotine (NICODERM CQ) 14 MG/24HR Place 1 patch onto the skin daily for 14 days 14 patch 5      No current facility-administered medications for this visit.          Allergies: Penicillins, Lamisil advanced [terbinafine], Stadol [butorphanol], and Reglan [metoclopramide]  Past Medical History        Past Medical History:   Diagnosis Date    Arthritis      Chronic kidney disease, stage 5, kidney failure (HCC)      Closed fracture of right shoulder girdle, with routine healing, subsequent encounter      DM (diabetes mellitus) type II controlled with renal manifestation ([facility] 06/1993    Gastroparesis      GERD (gastroesophageal reflux disease)      Hemodialysis patient ([facility]       dialysis on tues, thur, and sat at [facility]    Hypertension      Hypothyroid      Nasal congestion       recent    Noncompliance with medications      Palliative care patient 10/08/2019    Restless leg syndrome           Past Surgical History         Past Surgical History:   Procedure Laterality Date    BREAST SURGERY Left 2008     infected milk gland removed    CHOLECYSTECTOMY, LAPAROSCOPIC         2008    COLONOSCOPY Left 9/17/2020     Dr Mery Almanza hepatic flexure-Severe tortuosity with severe spasming, 1 yr recall    DIALYSIS FISTULA CREATION Left 1/25/2019     REVISION LEFT UPPER EXTREMITY AV ACCESS WITH LEFT BRACHIAL ARTERY TO LEFT AXILLARY VEIN WITH  INTERPOSITIONAL ARTEGRAFT   performed by Christi Deras MD at [address]   2012    [address] Right 1/25/2019     INSERTION OF RIGHT INTERNAL JUGULAR HEMODIALYSIS CATHETER performed by Christi Deras MD at [facility]   08/17/2018     1.  Moderately increased stainable iron identified by special stain in Kupffer cells and occasional hepatocytes. Negative for evidence of significant portal or lobular inflammation.  Negative for evidence of significant fibrosis    [place] COLONOSCOPY W/BIOPSY SINGLE/MULTIPLE N/A 10/20/2017     Dr Efrain Patel prep-Tubular AP (-) dysplasia x 2--3 yr recall    [place] EGD TRANSORAL BIOPSY SINGLE/MULTIPLE N/A 12/27/2016     Dr Alvarez Pry EGD TRANSORAL BIOPSY SINGLE/MULTIPLE N/A 10/20/2017     Dr Flory Covarrubias (-)    TUBAL LIGATION         1993    VASCULAR SURGERY Left 2016     fistula by Dr Jacqlyn Gottron at [address]   10/02/2017     [facility]. Left upper fistulograms/venograms, balloon angioplasty cephalic vein arch [id number] conquest.    VASCULAR SURGERY   2018     FISTULOGRAM    VASCULAR SURGERY   10/29/2018     SJS. Left upper fistulograms/venograms    VASCULAR SURGERY   2018     SJS. Arch aortogram,left upper arteriograms.  VASCULAR SURGERY   2019     SJS. Removal of tunneled dialyis catheter right internal jugular vein.  VASCULAR SURGERY   2019     SJS. Left upper extremity fistulogram including venography to the superior vena cava. Balloon angioplasty left subclavian vein with 10mm x 40mm conquest balloon. Balloon angioplasty mid/proximal upper arm cephalic vein with 77UG x 40mm conquest balloon. Venograms after balloon angioplasty. Stent left mid/proximal upper arm cephalic vein stenosis fluency 10mm x 60mm self-expanding covered stent. Balloon     VASCULAR SURGERY   2019     cont angioplasty stent with 10mm x 40mm conquest balloon. Completion venograms left upper extremity.         Family History   Family History   Problem Relation Age of Onset    Colon Cancer Mother            at 63    Colon Polyps Mother      Lung Cancer Father            at 66    Colon Polyps Father      Stomach Cancer Sister      Breast Cancer Sister 27    Depression Daughter 25         committed suicide    Liver Cancer Neg Hx      Liver Disease Neg Hx      Esophageal Cancer Neg Hx      Rectal Cancer Neg Hx           Social History            Tobacco Use    Smoking status: Current Every Day Smoker       Packs/day: 0.50       Years: 33.00       Pack years: 16.50       Types: Cigarettes    Smokeless tobacco: Never Used    Tobacco comment: NOW at 1/4 PD, started at age 24 and has averaged 2 PPD   Substance Use Topics    Alcohol use:  No         ROS:  14 point review of systems is negative except for the above.          PHYSICAL EXAM:     The patient is a 45 y.o. female  in no acute distress. She appears much older than her stated age. She is alert oriented and cooperative. Mood and affect are appropriate. Skin is warm and dry without rashes. She is quite chronically ill-appearing     BP (!) 90/50 (Site: Right Upper Arm, Position: Sitting, Cuff Size: Medium Adult)   Pulse 62         HEENT: Normocephalic and atraumatic. EOMs intact. Pupils equal and round and reactive to light and accommodation. External ears and nose are normal.  Sclera nonicteric. Conjunctiva normal  Oropharynx without masses or lesions.     Neck: Neck is supple without masses or thyromegaly     Chest: Lungs are clear to auscultation. Respiratory effort normal     Cardiac: Regular rate and rhythm without rubs, murmurs, or gallops     Breasts: Cori Narayanan  has unremarkable fibrocystic changes throughout both breasts. There are no dominant masses, no skin or nipple changes and no axillary adenopathy. I see nothing suspicious on physical examination. She does have 6 some milky drainage from multiple ducts in the right breast but serous drainage from a single duct which appears pathologic.     Abdomen: The abdomen is soft and nontender with no hepatosplenomegaly. There are no abdominal hernias noted.     Extremities: The extremities are normal. There are no signs of clubbing, cyanosis, or edema. She has a dialysis fistula in her left upper arm     IMPRESSION:   Right Nipple Drainage      PLAN: Apply New Skin for one month. And we will plan for surgery on or about May 14. Wedge excision of a right breast duct with lacrimal duct probes     I have seen, examined and reviewed this patient medication list, appropriate labs and imaging studies. I reviewed relevant medical records and others physicians notes. I discussed the plans of care with the patient. I answered all the questions to the patients satisfaction.   I, Dr Niranjan Eduardo, personally performed the services described in this documentation as scribed by Caleb Will MA in my presence and is both accurate and complete.     (Please note that portions of this note were completed with a voice recognition program. Efforts were made to edit the dictations but occasionally words are mis-transcribed.)  Over 50% of the total visit time of 25 minutes in face to face encounter with the patient, out of which more than 50% of the time was spent in counseling patient or family and coordination of care.  Counseling included but was not limited to time spent reviewing labs, imaging studies/ treatment plan and answering questions.

## 2021-05-25 NOTE — PROGRESS NOTES
A/ox4, skin p/w/d, no acute distress noted, patient friendly and talkative, patient to call packs bus for transport home, family at bedside.

## 2021-05-25 NOTE — ANESTHESIA PRE PROCEDURE
chronic kidney disease (CKD) N18.9, D63.1    Iron deficiency E61.1    Family history of colon cancer Z80.0    Unintentional weight loss R63.4    Type 2 diabetes mellitus with chronic kidney disease on chronic dialysis, with long-term current use of insulin (HCC) E11.22, N18.6, Z99.2, Z79.4    Noncompliance of patient with renal dialysis ([facility] [address] Z91.15    Respiratory failure (HCC) J96.90    Acute respiratory failure with hypoxia and hypercapnia (HCC) J96.01, J96.02    Dialysis AV fistula malfunction (HCC) T82.590A    Gastroparesis K31.84    Nausea and vomiting R11.2    Chronic constipation K59.09    Uncontrolled type 2 diabetes mellitus with complication, with long-term current use of insulin (HCC) E11.8, E11.65, Z79.4    Abnormal CT of liver R93.2    Other ascites R18.8    ESRD (end stage renal disease) on dialysis (HCC) N18.6, Z99.2    High hepatic iron concentration determined by biopsy of liver E83.19    Steal syndrome as complication of dialysis access (HCC) Y59.072J    PVD (peripheral vascular disease) (HCC) I73.9    ESRD on hemodialysis (HCC) N18.6, Z99.2    Hyponatremia E87.1    Hyperglycemia due to type 2 diabetes mellitus (HCC) E11.65    Normocytic anemia D64.9    Volume overload E87.70    Left homonymous hemianopsia H53.462    Acute intractable headache R51.9    Accelerated hypertension I10    Medically noncompliant Z91.19    Noncompliance with medications Z91.14    Seizure ([facility] [address] R56.9    Hyperglycemic hyperosmolar nonketotic coma (HCC) E11.01    Type 2 diabetes mellitus with hyperosmolar coma, with long-term current use of insulin ([facility] [address] E11.01, Z79.4    Palliative care patient Z51.5    Hypertensive emergency I16.1    Chronic epigastric pain R10.13, G89.29    Chronic nausea R11.0    Chronic vomiting R11.10    Abnormal CT of the abdomen R93.5    Poor appetite R63.0    Personal history of colonic polyps Z86.010    Hyperosmolar syndrome E87.0    DKA, type 2, not at goal ([address] E11.10    Hypertensive urgency I16.0    Altered mental state R41.82    Sacroiliitis ([address] M46.1    Lumbar radiculopathy M54.16    Left leg pain M79.605    Left leg swelling M79.89    Unable to ambulate R26.2    Discharge from nipple N64.52    Severe hyperglycemia due to diabetes mellitus ([address] E11.65       Past Medical History:        Diagnosis Date    Arthritis     Chronic kidney disease, stage 5, kidney failure (HCC)     Closed fracture of right shoulder girdle, with routine healing, subsequent encounter     DM (diabetes mellitus) type II controlled with renal manifestation ([address] 06/1993    Gastroparesis     GERD (gastroesophageal reflux disease)     Hemodialysis patient ([facility] [address]     dialysis on tues, thur, and sat at [facility]    Hypertension     Hypothyroid     Nasal congestion     recent    Noncompliance with medications     Palliative care patient 10/08/2019    Restless leg syndrome        Past Surgical History:        Procedure Laterality Date    BREAST SURGERY Left 2008    infected milk gland removed    CHOLECYSTECTOMY, LAPAROSCOPIC      2008    COLONOSCOPY Left 9/17/2020    Dr Esther Martinez hepatic flexure-Severe tortuosity with severe spasming, 1 yr recall    DIALYSIS FISTULA CREATION Left 1/25/2019    REVISION LEFT UPPER EXTREMITY AV ACCESS WITH LEFT BRACHIAL ARTERY TO LEFT AXILLARY VEIN WITH  INTERPOSITIONAL ARTEGRAFT   performed by Kiarra Clemons MD at [address]  2012    [address] Right 1/25/2019    INSERTION OF RIGHT INTERNAL JUGULAR HEMODIALYSIS CATHETER performed by Kiarra Clemons MD at [address] [facility]  08/17/2018    1.  Moderately increased stainable iron identified by special stain in Kupffer cells and occasional hepatocytes. Negative for evidence of significant portal or lobular inflammation.  Negative for evidence of significant fibrosis    [facility] COLONOSCOPY W/BIOPSY SINGLE/MULTIPLE N/A 10/20/2017    Dr Belkis Velasco 95%   Weight: 140 lb 10.5 oz (63.8 kg)   Height: 5' 6\" (1.676 m)                                              BP Readings from Last 3 Encounters:   05/25/21 (!) 157/77   05/16/21 (!) 198/84   05/01/21 122/69       NPO Status:                                                                                 BMI:   Wt Readings from Last 3 Encounters:   05/25/21 140 lb 10.5 oz (63.8 kg)   05/15/21 152 lb (68.9 kg)   04/30/21 122 lb 11.2 oz (55.7 kg)     Body mass index is 22.7 kg/m². CBC:   Lab Results   Component Value Date    WBC 7.1 05/16/2021    RBC 3.88 05/16/2021    HGB 11.1 05/16/2021    HCT 35.0 05/16/2021    MCV 90.2 05/16/2021    RDW 17.9 05/16/2021     05/16/2021       CMP:   Lab Results   Component Value Date     05/16/2021    K 3.9 05/16/2021    CL 92 05/16/2021    CO2 24 05/16/2021    BUN 15 05/16/2021    CREATININE 3.2 05/16/2021    GFRAA 18 05/16/2021    LABGLOM 15 05/16/2021    GLUCOSE 174 05/16/2021    PROT 7.9 05/15/2021    CALCIUM 8.8 05/16/2021    BILITOT 0.4 05/15/2021    ALKPHOS 134 05/15/2021    AST 13 05/15/2021    ALT 9 05/15/2021       POC Tests: No results for input(s): POCGLU, POCNA, POCK, POCCL, POCBUN, POCHEMO, POCHCT in the last 72 hours.     Coags:   Lab Results   Component Value Date    PROTIME 13.2 02/24/2021    INR 1.01 02/24/2021    APTT 35.7 02/24/2021       HCG (If Applicable):   Lab Results   Component Value Date    PREGTESTUR Negative 09/16/2020        ABGs:   Lab Results   Component Value Date    PHART 7.570 06/24/2020    PO2ART 50.0 06/24/2020    XJS9MES 36.0 06/24/2020    AVT7HSN 33.0 06/24/2020    BEART 10.3 06/24/2020    S7PMQQFE 88.5 06/24/2020        Type & Screen (If Applicable):  No results found for: LABABO, LABRH    Drug/Infectious Status (If Applicable):  No results found for: HIV, HEPCAB    COVID-19 Screening (If Applicable):   Lab Results   Component Value Date    COVID19 Not Detected 05/21/2021           Anesthesia Evaluation  Patient summary reviewed and Nursing notes reviewed no history of anesthetic complications:   Airway: Mallampati: II  TM distance: >3 FB   Neck ROM: full  Mouth opening: > = 3 FB Dental: normal exam         Pulmonary:Negative Pulmonary ROS and normal exam  breath sounds clear to auscultation      (-) shortness of breath and not a current smoker          Patient did not smoke on day of surgery. Cardiovascular:    (+) hypertension:,     (-) CAD,  angina and  CHF    NYHA Classification: I  ECG reviewed  Rhythm: regular  Rate: normal           Beta Blocker:  Not on Beta Blocker         Neuro/Psych:   Negative Neuro/Psych ROS  (+) neuromuscular disease:,    (-) seizures, CVA and depression/anxiety            GI/Hepatic/Renal: Neg GI/Hepatic/Renal ROS  (+) GERD:, renal disease: ESRD,      (-) hiatal hernia       Endo/Other: Negative Endo/Other ROS   (+) DiabetesType II DM, , hypothyroidism: arthritis:., electrolyte abnormalities, malignancy/cancer. Pt had PAT visit. Abdominal:       Abdomen: soft. Vascular:   + PVD, aortic or cerebral, . Anesthesia Plan      general     ASA 4     (Iv zofran within 30 min of closing )  Induction: intravenous. BIS  MIPS: Postoperative opioids intended and Prophylactic antiemetics administered. Anesthetic plan and risks discussed with patient. Use of blood products discussed with patient whom. Plan discussed with CRNA.     Attending anesthesiologist reviewed and agrees with Pre Eval content              Dania Vance MD   5/25/2021

## 2021-05-25 NOTE — ANESTHESIA POSTPROCEDURE EVALUATION
Department of Anesthesiology  Postprocedure Note    Patient: Katy Contreras  MRN: [id number]  YOB: 1970  Date of evaluation: 5/25/2021  Time:  12:57 PM     Procedure Summary     Date: 05/25/21 Room / Location: [address]    Anesthesia Start: 6491 Anesthesia Stop: 1257    Procedure: WEDGE EXCISION RIGHT BREAST DUCT WITH LACRIMAL DUCT PROBE, PEC BLOCK (Right Breast) Diagnosis: (E18.09)    Surgeons: Luh Medrano MD Responsible Provider: ROMINA Ortiz CRNA    Anesthesia Type: general ASA Status: 4          Anesthesia Type: general    Jane Phase I: Jane Score: 10    Jane Phase II:      Last vitals: Reviewed and per EMR flowsheets.        Anesthesia Post Evaluation    Patient location during evaluation: PACU  Patient participation: complete - patient participated  Level of consciousness: responsive to physical stimuli  Pain score: 0  Airway patency: patent  Nausea & Vomiting: no nausea and no vomiting  Complications: no  Cardiovascular status: hemodynamically stable  Respiratory status: acceptable, spontaneous ventilation, face mask and oral airway  Hydration status: euvolemic

## 2021-05-25 NOTE — PROGRESS NOTES
Right breast dressing d/i. Pt denies any pain.  Eating crackers and drinking diet 7-up without any n/v.

## 2021-05-26 NOTE — OP NOTE
[facility] ELENI Chowdary [address], [address] [facility]                                OPERATIVE REPORT    PATIENT NAME: Rickey Bear                     :        1970  MED REC NO:   [id number]                              ROOM:  ACCOUNT NO:   [de-identified]                           ADMIT DATE: 2021  PROVIDER:     Sarah Pantoja MD    DATE OF PROCEDURE:  2021    PREOPERATIVE DIAGNOSIS:  Right-sided bloody nipple drainage. POSTOPERATIVE DIAGNOSIS:  Right-sided bloody nipple drainage, probable  intraductal papilloma. PROCEDURE PERFORMED:  1. Right pectoral block. 2.  Wire localization with lacrimal duct probes. 3.  Wedge excision of right breast duct. SURGEON:  Sarah Pantoja MD    ASSISTANT:  Nikolas Mills PA-C    ANESTHESIA:  Pectoral block with TIVA. INDICATIONS:  The patient is a 50-year-old woman with bloody nipple  drainage. Ultrasound and mammograms did not show anything. We  discussed the risks and benefits of excision of the duct. She  understood and was agreeable. OPERATIVE PROCEDURE:  Today, she was brought to the operating room,  adequately sedated, prepped the skin with chlorhexidine and proceeded  with the right-sided pectoral block. We utilized a solution of 50 mL of  0.2% ropivacaine, 8 mg of Decadron and 50 mL of saline. We injected the  intercostal perforators at the lateral sternal border under ultrasound  guidance. We then anesthetized the infraclavicular area and the  inframammary crease. We then went laterally and injected the  interpectoral space and the lateral intercostal perforators again under  ultrasound guidance. We then reprepped and redraped, and identified the  duct of interest and inserted a wire into that duct down into the breast  parenchyma. We then excised a wedge of breast tissue containing the  previously placed wire.   Once this was accomplished, we irrigated  copiously, obtained good hemostasis and closed with 2-0 and 3-0 Vicryl  and 4-0 Stratafix for the skin. I reviewed the specimen with Pathology. Grossly, there appeared to be a small intraductal papilloma. Sterile  dressings were applied. Estimated blood loss, minimal.  Complications,  none. She tolerated the procedure well.         Jackie Fleming MD    D: 05/25/2021 14:54:47      T: 05/25/2021 17:14:08     [email or web address]  Job#: [id number]     Doc#: [id number]    CC:

## 2021-05-29 ENCOUNTER — APPOINTMENT (OUTPATIENT)
Dept: CT IMAGING | Age: 51
DRG: 189 | End: 2021-05-29
Payer: MEDICARE

## 2021-05-29 ENCOUNTER — HOSPITAL ENCOUNTER (INPATIENT)
Age: 51
LOS: 1 days | Discharge: HOME | DRG: 189 | End: 2021-05-30
Attending: EMERGENCY MEDICINE
Payer: MEDICARE

## 2021-05-29 ENCOUNTER — APPOINTMENT (OUTPATIENT)
Dept: GENERAL RADIOLOGY | Age: 51
DRG: 189 | End: 2021-05-29
Payer: MEDICARE

## 2021-05-29 DIAGNOSIS — Z99.2 ESRD ON DIALYSIS (HCC): ICD-10-CM

## 2021-05-29 DIAGNOSIS — J81.0 ACUTE PULMONARY EDEMA (HCC): ICD-10-CM

## 2021-05-29 DIAGNOSIS — J96.01 ACUTE RESPIRATORY FAILURE WITH HYPOXIA (HCC): Primary | ICD-10-CM

## 2021-05-29 DIAGNOSIS — J44.1 CHRONIC OBSTRUCTIVE PULMONARY DISEASE WITH ACUTE EXACERBATION (HCC): ICD-10-CM

## 2021-05-29 DIAGNOSIS — N18.6 ESRD ON DIALYSIS (HCC): ICD-10-CM

## 2021-05-29 DIAGNOSIS — I10 ESSENTIAL HYPERTENSION: ICD-10-CM

## 2021-05-29 LAB
ADENOVIRUS BY PCR: NOT DETECTED
ALBUMIN SERPL-MCNC: 3.8 G/DL (ref 3.5–5.2)
ALP BLD-CCNC: 179 U/L (ref 35–104)
ALT SERPL-CCNC: 68 U/L (ref 5–33)
ANION GAP SERPL CALCULATED.3IONS-SCNC: 18 MMOL/L (ref 7–19)
AST SERPL-CCNC: 90 U/L (ref 5–32)
BASE EXCESS ARTERIAL: 2.2 MMOL/L (ref -2–2)
BASOPHILS ABSOLUTE: 0.1 K/UL (ref 0–0.2)
BASOPHILS RELATIVE PERCENT: 1.2 % (ref 0–1)
BILIRUB SERPL-MCNC: 0.5 MG/DL (ref 0.2–1.2)
BORDETELLA PARAPERTUSSIS BY PCR: NOT DETECTED
BORDETELLA PERTUSSIS BY PCR: NOT DETECTED
BUN BLDV-MCNC: 29 MG/DL (ref 6–20)
CALCIUM SERPL-MCNC: 8.5 MG/DL (ref 8.6–10)
CARBOXYHEMOGLOBIN ARTERIAL: 2.4 % (ref 0–5)
CHLAMYDOPHILIA PNEUMONIAE BY PCR: NOT DETECTED
CHLORIDE BLD-SCNC: 91 MMOL/L (ref 98–111)
CO2: 23 MMOL/L (ref 22–29)
CORONAVIRUS 229E BY PCR: NOT DETECTED
CORONAVIRUS HKU1 BY PCR: NOT DETECTED
CORONAVIRUS NL63 BY PCR: NOT DETECTED
CORONAVIRUS OC43 BY PCR: NOT DETECTED
CREAT SERPL-MCNC: 2.5 MG/DL (ref 0.5–0.9)
EOSINOPHILS ABSOLUTE: 0.1 K/UL (ref 0–0.6)
EOSINOPHILS RELATIVE PERCENT: 0.9 % (ref 0–5)
GFR AFRICAN AMERICAN: 24
GFR NON-AFRICAN AMERICAN: 20
GLUCOSE BLD-MCNC: 291 MG/DL (ref 70–99)
GLUCOSE BLD-MCNC: 399 MG/DL (ref 74–109)
GLUCOSE BLD-MCNC: 429 MG/DL (ref 70–99)
HCO3 ARTERIAL: 25 MMOL/L (ref 22–26)
HCT VFR BLD CALC: 34 % (ref 37–47)
HEMOGLOBIN, ART, EXTENDED: 11 G/DL (ref 12–16)
HEMOGLOBIN: 10.7 G/DL (ref 12–16)
HUMAN METAPNEUMOVIRUS BY PCR: NOT DETECTED
HUMAN RHINOVIRUS/ENTEROVIRUS BY PCR: NOT DETECTED
IMMATURE GRANULOCYTES #: 0 K/UL
INFLUENZA A BY PCR: NOT DETECTED
INFLUENZA B BY PCR: NOT DETECTED
INR BLD: 1.06 (ref 0.88–1.18)
LACTIC ACID: 0.8 MMOL/L (ref 0.5–1.9)
LYMPHOCYTES ABSOLUTE: 0.6 K/UL (ref 1.1–4.5)
LYMPHOCYTES RELATIVE PERCENT: 6.2 % (ref 20–40)
MCH RBC QN AUTO: 29.2 PG (ref 27–31)
MCHC RBC AUTO-ENTMCNC: 31.5 G/DL (ref 33–37)
MCV RBC AUTO: 92.6 FL (ref 81–99)
METHEMOGLOBIN ARTERIAL: 0.6 %
MONOCYTES ABSOLUTE: 0.6 K/UL (ref 0–0.9)
MONOCYTES RELATIVE PERCENT: 6.9 % (ref 0–10)
MYCOPLASMA PNEUMONIAE BY PCR: NOT DETECTED
NEUTROPHILS ABSOLUTE: 7.6 K/UL (ref 1.5–7.5)
NEUTROPHILS RELATIVE PERCENT: 84.5 % (ref 50–65)
O2 CONTENT ARTERIAL: 13 ML/DL
O2 SAT, ARTERIAL: 83.8 %
O2 THERAPY: ABNORMAL
PARAINFLUENZA VIRUS 1 BY PCR: NOT DETECTED
PARAINFLUENZA VIRUS 2 BY PCR: NOT DETECTED
PARAINFLUENZA VIRUS 3 BY PCR: NOT DETECTED
PARAINFLUENZA VIRUS 4 BY PCR: NOT DETECTED
PCO2 ARTERIAL: 32 MMHG (ref 35–45)
PDW BLD-RTO: 17.1 % (ref 11.5–14.5)
PERFORMED ON: ABNORMAL
PERFORMED ON: ABNORMAL
PH ARTERIAL: 7.5 (ref 7.35–7.45)
PLATELET # BLD: 116 K/UL (ref 130–400)
PMV BLD AUTO: 9.6 FL (ref 9.4–12.3)
PO2 ARTERIAL: 48 MMHG (ref 80–100)
POTASSIUM REFLEX MAGNESIUM: 4 MMOL/L (ref 3.5–5)
POTASSIUM, WHOLE BLOOD: 3.9
PRO-BNP: ABNORMAL PG/ML (ref 0–900)
PROTHROMBIN TIME: 13.7 SEC (ref 12–14.6)
RBC # BLD: 3.67 M/UL (ref 4.2–5.4)
RESPIRATORY SYNCYTIAL VIRUS BY PCR: NOT DETECTED
SARS-COV-2, PCR: NOT DETECTED
SODIUM BLD-SCNC: 132 MMOL/L (ref 136–145)
TOTAL PROTEIN: 7.4 G/DL (ref 6.6–8.7)
TROPONIN: 0.11 NG/ML (ref 0–0.03)
TROPONIN: 0.11 NG/ML (ref 0–0.03)
TROPONIN: 0.13 NG/ML (ref 0–0.03)
WBC # BLD: 9 K/UL (ref 4.8–10.8)

## 2021-05-29 PROCEDURE — 82803 BLOOD GASES ANY COMBINATION: CPT

## 2021-05-29 PROCEDURE — 85025 COMPLETE CBC W/AUTO DIFF WBC: CPT

## 2021-05-29 PROCEDURE — 71045 X-RAY EXAM CHEST 1 VIEW: CPT

## 2021-05-29 PROCEDURE — 93005 ELECTROCARDIOGRAM TRACING: CPT | Performed by: NURSE PRACTITIONER

## 2021-05-29 PROCEDURE — 87040 BLOOD CULTURE FOR BACTERIA: CPT

## 2021-05-29 PROCEDURE — 36600 WITHDRAWAL OF ARTERIAL BLOOD: CPT

## 2021-05-29 PROCEDURE — 83880 ASSAY OF NATRIURETIC PEPTIDE: CPT

## 2021-05-29 PROCEDURE — 2000000000 HC ICU R&B

## 2021-05-29 PROCEDURE — 99285 EMERGENCY DEPT VISIT HI MDM: CPT

## 2021-05-29 PROCEDURE — 6360000004 HC RX CONTRAST MEDICATION: Performed by: NURSE PRACTITIONER

## 2021-05-29 PROCEDURE — 6370000000 HC RX 637 (ALT 250 FOR IP): Performed by: INTERNAL MEDICINE

## 2021-05-29 PROCEDURE — 80053 COMPREHEN METABOLIC PANEL: CPT

## 2021-05-29 PROCEDURE — 2700000000 HC OXYGEN THERAPY PER DAY

## 2021-05-29 PROCEDURE — 84484 ASSAY OF TROPONIN QUANT: CPT

## 2021-05-29 PROCEDURE — 83605 ASSAY OF LACTIC ACID: CPT

## 2021-05-29 PROCEDURE — 84132 ASSAY OF SERUM POTASSIUM: CPT

## 2021-05-29 PROCEDURE — 82947 ASSAY GLUCOSE BLOOD QUANT: CPT

## 2021-05-29 PROCEDURE — 85610 PROTHROMBIN TIME: CPT

## 2021-05-29 PROCEDURE — 94660 CPAP INITIATION&MGMT: CPT

## 2021-05-29 PROCEDURE — 36415 COLL VENOUS BLD VENIPUNCTURE: CPT

## 2021-05-29 PROCEDURE — 0202U NFCT DS 22 TRGT SARS-COV-2: CPT

## 2021-05-29 PROCEDURE — 6360000002 HC RX W HCPCS: Performed by: INTERNAL MEDICINE

## 2021-05-29 PROCEDURE — 71275 CT ANGIOGRAPHY CHEST: CPT

## 2021-05-29 RX ORDER — LABETALOL HYDROCHLORIDE 5 MG/ML
20 INJECTION, SOLUTION INTRAVENOUS EVERY 4 HOURS PRN
Status: DISCONTINUED | OUTPATIENT
Start: 2021-05-29 | End: 2021-05-30 | Stop reason: HOSPADM

## 2021-05-29 RX ORDER — NIFEDIPINE 60 MG/1
60 TABLET, EXTENDED RELEASE ORAL DAILY
Status: DISCONTINUED | OUTPATIENT
Start: 2021-05-29 | End: 2021-05-30 | Stop reason: HOSPADM

## 2021-05-29 RX ORDER — HYDROCODONE BITARTRATE AND ACETAMINOPHEN 5; 325 MG/1; MG/1
1 TABLET ORAL EVERY 6 HOURS PRN
Status: DISCONTINUED | OUTPATIENT
Start: 2021-05-29 | End: 2021-05-30 | Stop reason: HOSPADM

## 2021-05-29 RX ORDER — LISINOPRIL 10 MG/1
20 TABLET ORAL 2 TIMES DAILY
Status: DISCONTINUED | OUTPATIENT
Start: 2021-05-29 | End: 2021-05-30 | Stop reason: HOSPADM

## 2021-05-29 RX ORDER — ACETAMINOPHEN 650 MG/1
650 SUPPOSITORY RECTAL EVERY 6 HOURS PRN
Status: DISCONTINUED | OUTPATIENT
Start: 2021-05-29 | End: 2021-05-30 | Stop reason: HOSPADM

## 2021-05-29 RX ORDER — HYDROCHLOROTHIAZIDE 25 MG/1
50 TABLET ORAL DAILY
Status: DISCONTINUED | OUTPATIENT
Start: 2021-05-29 | End: 2021-05-30 | Stop reason: HOSPADM

## 2021-05-29 RX ORDER — INSULIN GLARGINE 100 [IU]/ML
40 INJECTION, SOLUTION SUBCUTANEOUS NIGHTLY
Status: DISCONTINUED | OUTPATIENT
Start: 2021-05-29 | End: 2021-05-30 | Stop reason: HOSPADM

## 2021-05-29 RX ORDER — ALBUTEROL SULFATE 2.5 MG/3ML
2.5 SOLUTION RESPIRATORY (INHALATION) EVERY 6 HOURS PRN
Status: DISCONTINUED | OUTPATIENT
Start: 2021-05-29 | End: 2021-05-30 | Stop reason: HOSPADM

## 2021-05-29 RX ORDER — LACTULOSE 10 G/15ML
20 SOLUTION ORAL 3 TIMES DAILY
Status: DISCONTINUED | OUTPATIENT
Start: 2021-05-29 | End: 2021-05-30 | Stop reason: HOSPADM

## 2021-05-29 RX ORDER — ATORVASTATIN CALCIUM 20 MG/1
20 TABLET, FILM COATED ORAL DAILY
Status: DISCONTINUED | OUTPATIENT
Start: 2021-05-29 | End: 2021-05-30 | Stop reason: HOSPADM

## 2021-05-29 RX ORDER — ISOSORBIDE MONONITRATE 60 MG/1
120 TABLET, EXTENDED RELEASE ORAL DAILY
Status: DISCONTINUED | OUTPATIENT
Start: 2021-05-29 | End: 2021-05-30 | Stop reason: HOSPADM

## 2021-05-29 RX ORDER — DULOXETIN HYDROCHLORIDE 60 MG/1
60 CAPSULE, DELAYED RELEASE ORAL DAILY
Status: DISCONTINUED | OUTPATIENT
Start: 2021-05-30 | End: 2021-05-30 | Stop reason: HOSPADM

## 2021-05-29 RX ORDER — LEVOTHYROXINE SODIUM 0.07 MG/1
150 TABLET ORAL DAILY
Status: DISCONTINUED | OUTPATIENT
Start: 2021-05-29 | End: 2021-05-30 | Stop reason: HOSPADM

## 2021-05-29 RX ORDER — CLONIDINE HYDROCHLORIDE 0.1 MG/1
0.3 TABLET ORAL EVERY 8 HOURS SCHEDULED
Status: DISCONTINUED | OUTPATIENT
Start: 2021-05-29 | End: 2021-05-30 | Stop reason: HOSPADM

## 2021-05-29 RX ORDER — ACETAMINOPHEN 325 MG/1
650 TABLET ORAL EVERY 6 HOURS PRN
Status: DISCONTINUED | OUTPATIENT
Start: 2021-05-29 | End: 2021-05-30 | Stop reason: HOSPADM

## 2021-05-29 RX ORDER — ROPINIROLE 4 MG/1
4 TABLET, FILM COATED ORAL NIGHTLY
Status: DISCONTINUED | OUTPATIENT
Start: 2021-05-29 | End: 2021-05-30 | Stop reason: HOSPADM

## 2021-05-29 RX ORDER — HYDRALAZINE HYDROCHLORIDE 20 MG/ML
10 INJECTION INTRAMUSCULAR; INTRAVENOUS EVERY 6 HOURS PRN
Status: DISCONTINUED | OUTPATIENT
Start: 2021-05-29 | End: 2021-05-30 | Stop reason: HOSPADM

## 2021-05-29 RX ORDER — ERGOCALCIFEROL 1.25 MG/1
50000 CAPSULE ORAL
Status: DISCONTINUED | OUTPATIENT
Start: 2021-05-29 | End: 2021-05-30 | Stop reason: HOSPADM

## 2021-05-29 RX ORDER — SENNA PLUS 8.6 MG/1
1 TABLET ORAL DAILY
Status: DISCONTINUED | OUTPATIENT
Start: 2021-05-29 | End: 2021-05-30 | Stop reason: HOSPADM

## 2021-05-29 RX ORDER — ALBUTEROL SULFATE 90 UG/1
2 AEROSOL, METERED RESPIRATORY (INHALATION) EVERY 6 HOURS PRN
Status: DISCONTINUED | OUTPATIENT
Start: 2021-05-29 | End: 2021-05-29 | Stop reason: CLARIF

## 2021-05-29 RX ORDER — METOPROLOL SUCCINATE 50 MG/1
100 TABLET, EXTENDED RELEASE ORAL
Status: DISCONTINUED | OUTPATIENT
Start: 2021-05-30 | End: 2021-05-30 | Stop reason: HOSPADM

## 2021-05-29 RX ORDER — SEVELAMER CARBONATE 800 MG/1
800 TABLET, FILM COATED ORAL
Status: DISCONTINUED | OUTPATIENT
Start: 2021-05-29 | End: 2021-05-30 | Stop reason: HOSPADM

## 2021-05-29 RX ORDER — HEPARIN SODIUM 5000 [USP'U]/ML
5000 INJECTION, SOLUTION INTRAVENOUS; SUBCUTANEOUS EVERY 8 HOURS SCHEDULED
Status: DISCONTINUED | OUTPATIENT
Start: 2021-05-29 | End: 2021-05-30 | Stop reason: HOSPADM

## 2021-05-29 RX ADMIN — HYDROCHLOROTHIAZIDE 50 MG: 25 TABLET ORAL at 18:36

## 2021-05-29 RX ADMIN — INSULIN GLARGINE 40 UNITS: 100 INJECTION, SOLUTION SUBCUTANEOUS at 20:26

## 2021-05-29 RX ADMIN — ISOSORBIDE MONONITRATE 120 MG: 60 TABLET, EXTENDED RELEASE ORAL at 18:36

## 2021-05-29 RX ADMIN — LACTULOSE 20 G: 20 SOLUTION ORAL at 18:37

## 2021-05-29 RX ADMIN — IOPAMIDOL 90 ML: 755 INJECTION, SOLUTION INTRAVENOUS at 14:51

## 2021-05-29 RX ADMIN — HYDRALAZINE HYDROCHLORIDE 10 MG: 20 INJECTION INTRAMUSCULAR; INTRAVENOUS at 18:37

## 2021-05-29 RX ADMIN — ATORVASTATIN CALCIUM 20 MG: 20 TABLET, FILM COATED ORAL at 18:37

## 2021-05-29 RX ADMIN — ROPINIROLE HYDROCHLORIDE 4 MG: 4 TABLET, FILM COATED ORAL at 20:26

## 2021-05-29 RX ADMIN — STANDARDIZED SENNA CONCENTRATE 8.6 MG: 8.6 TABLET ORAL at 18:36

## 2021-05-29 RX ADMIN — SEVELAMER CARBONATE 800 MG: 800 TABLET, FILM COATED ORAL at 18:36

## 2021-05-29 RX ADMIN — LEVOTHYROXINE SODIUM 150 MCG: 75 TABLET ORAL at 18:36

## 2021-05-29 RX ADMIN — NIFEDIPINE 60 MG: 60 TABLET, FILM COATED, EXTENDED RELEASE ORAL at 18:47

## 2021-05-29 RX ADMIN — INSULIN LISPRO 10 UNITS: 100 INJECTION, SOLUTION INTRAVENOUS; SUBCUTANEOUS at 18:48

## 2021-05-29 RX ADMIN — HEPARIN SODIUM 5000 UNITS: 5000 INJECTION INTRAVENOUS; SUBCUTANEOUS at 18:39

## 2021-05-29 RX ADMIN — CLONIDINE HYDROCHLORIDE 0.3 MG: 0.1 TABLET ORAL at 18:37

## 2021-05-29 RX ADMIN — LISINOPRIL 20 MG: 10 TABLET ORAL at 20:26

## 2021-05-29 RX ADMIN — ERGOCALCIFEROL 50000 UNITS: 1.25 CAPSULE ORAL at 18:36

## 2021-05-29 RX ADMIN — HYDRALAZINE HYDROCHLORIDE 75 MG: 50 TABLET, FILM COATED ORAL at 18:47

## 2021-05-29 ASSESSMENT — ENCOUNTER SYMPTOMS
COUGH: 1
SHORTNESS OF BREATH: 1

## 2021-05-29 ASSESSMENT — PAIN SCALES - GENERAL: PAINLEVEL_OUTOF10: 8

## 2021-05-29 NOTE — PROGRESS NOTES
4 Eyes Skin Assessment    Shane Tatum is being assessed upon: Admission    I agree that Princess Franklin RN, along with Marielle Jennings RN have performed a thorough Head to Toe Skin Assessment on the patient. ALL assessment sites listed below have been assessed. Areas assessed by both nurses:     [x]   Head, Face, and Ears   [x]   Shoulders, Back, and Chest  [x]   Arms, Elbows, and Hands   [x]   Coccyx, Sacrum, and Ischium  [x]   Legs, Feet, and Heels    Does the Patient have Skin Breakdown? Yes, wound(s) noted upon assessment. It is the responsibility of the Primary Nurse to assure that the following documentation, preventions, orders, and consults are complete on the above noted wound(s): Wound LDA initiated. LDA Flowsheet Documentation includes the Tracie-wound, Wound Assessment, Measurements, Dressing Treatment, Drainage, and Color. Previous surgical incision to R breast, sutures and dressing is in place, no drainage noted.   Marek Prevention initiated: No  Wound Care Orders initiated: No    WOC nurse consulted for Pressure Injury (Stage 3,4, Unstageable, DTI, NWPT, and Complex wounds) and New or Established Ostomies: No        Primary Nurse eSignature: Adrián Hooker RN on 5/29/2021 at 5:43 PM      Co-Signer eSignature: Electronically signed by Berton Cushing, RN on 5/30/21 at 10:43 AM CDT

## 2021-05-29 NOTE — ED PROVIDER NOTES
livan awad, and sat at [facility]    Hypertension     Hypothyroid     Nasal congestion     recent    Noncompliance with medications     Palliative care patient 10/08/2019    Restless leg syndrome          SURGICAL HISTORY       Past Surgical History:   Procedure Laterality Date    BREAST SURGERY Left 2008    infected milk gland removed    BREAST SURGERY Right 5/25/2021    WEDGE EXCISION RIGHT BREAST DUCT WITH LACRIMAL DUCT PROBE, PEC BLOCK performed by Melanie Jane MD at [address], [facility]      2008    COLONOSCOPY Left 9/17/2020    Dr Ingrid Mg hepatic flexure-Severe tortuosity with severe spasming, 1 yr recall    DIALYSIS FISTULA CREATION Left 1/25/2019    REVISION LEFT UPPER EXTREMITY AV ACCESS WITH LEFT BRACHIAL ARTERY TO LEFT AXILLARY VEIN WITH  INTERPOSITIONAL ARTEGRAFT   performed by Isaac Herrera MD at [address]  2012    [address] Right 1/25/2019    INSERTION OF RIGHT INTERNAL JUGULAR HEMODIALYSIS CATHETER performed by Isaac Herrera MD at [address] [facility]  08/17/2018    1.  Moderately increased stainable iron identified by special stain in Kupffer cells and occasional hepatocytes. Negative for evidence of significant portal or lobular inflammation. Negative for evidence of significant fibrosis    DE COLONOSCOPY W/BIOPSY SINGLE/MULTIPLE N/A 10/20/2017    Dr Davian Frederick prep-Tubular AP (-) dysplasia x 2--3 yr recall    DE EGD TRANSORAL BIOPSY SINGLE/MULTIPLE N/A 12/27/2016    Dr Ashtyn Victoria EGD TRANSORAL BIOPSY SINGLE/MULTIPLE N/A 10/20/2017    Dr Maribel Chacon (-)   [address] [facility] VASCULAR SURGERY Left 2016    fistula by Dr Edilma Tian at [address]  10/02/2017    [facility]. Left upper fistulograms/venograms, balloon angioplasty cephalic vein arch 78V14 conquest.    VASCULAR SURGERY  08/2018    FISTULOGRAM    VASCULAR SURGERY  10/29/2018    SJS. Left upper fistulograms/venograms    VASCULAR SURGERY  12/19/2018    S[facility]. Arch aortogram,left upper arteriograms.  VASCULAR SURGERY  03/06/2019    SJS. Removal of tunneled dialyis catheter right internal jugular vein.  VASCULAR SURGERY  08/28/2019    SJS. Left upper extremity fistulogram including venography to the superior vena cava. Balloon angioplasty left subclavian vein with 10mm x 40mm conquest balloon. Balloon angioplasty mid/proximal upper arm cephalic vein with 92AX x 40mm conquest balloon. Venograms after balloon angioplasty. Stent left mid/proximal upper arm cephalic vein stenosis fluency 10mm x 60mm self-expanding covered stent. Balloon     VASCULAR SURGERY  08/28/2019    cont angioplasty stent with 10mm x 40mm conquest balloon. Completion venograms left upper extremity. CURRENT MEDICATIONS       Current Discharge Medication List      CONTINUE these medications which have NOT CHANGED    Details   HYDROcodone-acetaminophen (NORCO) 5-325 MG per tablet Take 1 tablet by mouth every 6 hours as needed for Pain. Qty: 10 tablet, Refills: 0    Associated Diagnoses: Discharge from nipple      !! insulin glargine (BASAGLAR KWIKPEN) 100 UNIT/ML injection pen Inject 40 Units into the skin nightly Indications: Diabetes      nystatin (MYCOSTATIN) 448352 UNIT/ML suspension Take 5 mLs by mouth 4 times daily  Qty: 1 Bottle, Refills: 1      nystatin (MYCOSTATIN) 710742 UNIT/GM cream Apply topically 2 times daily.   Qty: 15 g, Refills: 0      !! insulin glargine (LANTUS SOLOSTAR) 100 UNIT/ML injection pen Inject 18 Units into the skin daily Indications: sliding scale  Qty: 5 pen, Refills: 1      insulin aspart (NOVOLOG FLEXPEN) 100 UNIT/ML injection pen Inject 10 Units into the skin 3 times daily Indications: Diabetes  Qty: 5 pen, Refills: 3      lactulose (CHRONULAC) 10 GM/15ML solution Take 30 mLs by mouth 3 times daily  Qty: 2700 mL, Refills: 1      hydrALAZINE (APRESOLINE) 50 MG tablet Take 1.5 tablets by mouth every 8 hours  Qty: 90 tablet, Refills: 0 hydroCHLOROthiazide (HYDRODIURIL) 25 MG tablet Take 2 tablets by mouth daily  Qty: 30 tablet, Refills: 3      senna (SENNA-LAX) 8.6 MG tablet Take 1 tablet by mouth daily      cloNIDine (CATAPRES) 0.3 MG tablet Take 1 tablet by mouth every 8 hours  Qty: 60 tablet, Refills: 3      sevelamer (RENVELA) 800 MG tablet Take 1 tablet by mouth 3 times daily (with meals)      lisinopril (PRINIVIL;ZESTRIL) 20 MG tablet Take 1 tablet by mouth 2 times daily  Qty: 30 tablet, Refills: 0      metoprolol succinate (TOPROL XL) 100 MG extended release tablet Take 1 tablet by mouth every morning (before breakfast)  Qty: 30 tablet, Refills: 0      albuterol sulfate  (90 Base) MCG/ACT inhaler Inhale 2 puffs into the lungs every 4 hours as needed      levothyroxine (SYNTHROID) 150 MCG tablet Take 150 mcg by mouth Daily      NIFEdipine (ADALAT CC) 60 MG extended release tablet Take 60 mg by mouth daily      isosorbide mononitrate (IMDUR) 120 MG extended release tablet Take 1 tablet by mouth daily  Qty: 30 tablet, Refills: 0      DULoxetine (CYMBALTA) 60 MG extended release capsule Take 60 mg by mouth daily      rOPINIRole (REQUIP) 2 MG tablet Take 4 mg by mouth nightly Indications: Restless Leg Syndrome       simvastatin (ZOCOR) 40 MG tablet Take 40 mg by mouth nightly       Ergocalciferol (VITAMIN D2) 10 MCG (400 UNIT) TABS Take 1.25 mg by mouth every 14 days       ! ! - Potential duplicate medications found. Please discuss with provider.           ALLERGIES     Penicillins, Lamisil advanced [terbinafine], Stadol [butorphanol], and Reglan [metoclopramide]    FAMILY HISTORY       Family History   Problem Relation Age of Onset    Colon Cancer Mother          at 63    Colon Polyps Mother     Lung Cancer Father          at 66    Colon Polyps Father     Stomach Cancer Sister     Breast Cancer Sister 27    Depression Daughter 25        committed suicide    Liver Cancer Neg Hx     Liver Disease Neg Hx     Esophageal Cancer Neg Hx     Rectal Cancer Neg Hx           SOCIAL HISTORY       Social History     Socioeconomic History    Marital status: Single     Spouse name: Not on file    Number of children: 2    Years of education: Not on file    Highest education level: Not on file   Occupational History    Not on file   Tobacco Use    Smoking status: Current Every Day Smoker     Packs/day: 0.50     Years: 33.00     Pack years: 16.50     Types: Cigarettes    Smokeless tobacco: Never Used    Tobacco comment: NOW at 1/4 PD, started at age 24 and has averaged 2 PPD   Vaping Use    Vaping Use: Never used   Substance and Sexual Activity    Alcohol use: No    Drug use: Not Currently     Types: Marijuana    Sexual activity: Not Currently     Partners: Male   Other Topics Concern    Not on file   Social History Narrative    Not on file     Social Determinants of Health     Financial Resource Strain:     Difficulty of Paying Living Expenses:    Food Insecurity:     Worried About Running Out of Food in the Last Year:     Ran Out of Food in the Last Year:    Transportation Needs:     Lack of Transportation (Medical):      Lack of Transportation (Non-Medical):    Physical Activity:     Days of Exercise per Week:     Minutes of Exercise per Session:    Stress:     Feeling of Stress :    Social Connections:     Frequency of Communication with Friends and Family:     Frequency of Social Gatherings with Friends and Family:     Attends [employer] Services:     Active Member of Clubs or Organizations:     Attends Club or Organization Meetings:     Marital Status:    Intimate Partner Violence:     Fear of Current or Ex-Partner:     Emotionally Abused:     Physically Abused:     Sexually Abused:        SCREENINGS    Yousuf Coma Scale  Eye Opening: Spontaneous  Best Verbal Response: Oriented  Best Motor Response: Obeys commands  Yousuf Coma Scale Score: 15        PHYSICAL EXAM    (up to 7 for level 4, 8 or more for level 5)     ED Triage Vitals [05/29/21 1320]   BP Temp Temp Source Pulse Resp SpO2 Height Weight   (!) 184/75 98.1 °F (36.7 °C) Oral 96 20 93 % 5' 6\" (1.676 m) 155 lb 6.8 oz (70.5 kg)       Physical Exam  Vitals reviewed. HENT:      Head: Normocephalic. Right Ear: External ear normal.      Left Ear: External ear normal.   Eyes:      Conjunctiva/sclera: Conjunctivae normal.      Pupils: Pupils are equal, round, and reactive to light. Cardiovascular:      Rate and Rhythm: Normal rate and regular rhythm. Heart sounds: Normal heart sounds. Pulmonary:      Effort: Pulmonary effort is normal.      Breath sounds: Decreased breath sounds present. Abdominal:      General: Bowel sounds are normal.      Palpations: Abdomen is soft. Musculoskeletal:         General: Normal range of motion. Cervical back: Normal range of motion. Skin:     General: Skin is warm and dry. Neurological:      Mental Status: She is alert and oriented to person, place, and time. DIAGNOSTIC RESULTS     EKG: All EKG's are interpreted by the Emergency Department Physician who either signs or Co-signs this chart in the absence of acardiologist.    There is a regular rate and rhythm. SR w/LVH similar appearance to old ekg Normal WA interval and normal P waves. Normal QRS interval. Normal QT interval. No obvious ST elevation or ST depression. Repeat 2hr ekg There is a regular rate and rhythm. SR w/artifact hr 94b/min Normal WA interval and normal P waves. Normal QRS interval. Normal QT interval. No obvious ST elevation or ST depression. RADIOLOGY:   Non-plain film images such as CT, Ultrasound andMRI are read by the radiologist. Plain radiographic images are visualized and preliminarily interpreted by the emergency physician with the below findings:        Interpretation per the Radiologist below, if available at the time of this note:    CTA PULMONARY W CONTRAST   Final Result   1. No evidence of pulmonary embolus. 2.  Bilateral interlobular septal thickening, groundglass opacity, and   consolidation. Findings are favored to represent moderate-severe   pulmonary edema. 3.  Small pleural effusions. 4.  Diffuse body wall soft tissue edema. Signed by Dr Phillip Mclaughlin on 5/29/2021 4:15 PM      XR CHEST PORTABLE   Final Result   Bilateral interstitial and airspace opacity.  Favor moderate pulmonary   edema but correlate with history and labs as infectious process is   also within the differential.   Signed by Dr Phillip Mclaughlin on 5/29/2021 3:23 PM            ED BEDSIDE ULTRASOUND:   Performed by ED Physician - none    LABS:  Labs Reviewed   CBC WITH AUTO DIFFERENTIAL - Abnormal; Notable for the following components:       Result Value    RBC 3.67 (*)     Hemoglobin 10.7 (*)     Hematocrit 34.0 (*)     MCHC 31.5 (*)     RDW 17.1 (*)     Platelets 149 (*)     Neutrophils % 84.5 (*)     Lymphocytes % 6.2 (*)     Basophils % 1.2 (*)     Neutrophils Absolute 7.6 (*)     Lymphocytes Absolute 0.6 (*)     All other components within normal limits   COMPREHENSIVE METABOLIC PANEL W/ REFLEX TO MG FOR LOW K - Abnormal; Notable for the following components:    Sodium 132 (*)     Chloride 91 (*)     Glucose 399 (*)     BUN 29 (*)     CREATININE 2.5 (*)     GFR Non- 20 (*)     GFR  24 (*)     Calcium 8.5 (*)     Alkaline Phosphatase 179 (*)     ALT 68 (*)     AST 90 (*)     All other components within normal limits   BLOOD GAS, ARTERIAL - Abnormal; Notable for the following components:    pH, Arterial 7.500 (*)     pCO2, Arterial 32.0 (*)     pO2, Arterial 48.0 (*)     Base Excess, Arterial 2.2 (*)     Hemoglobin, Art, Extended 11.0 (*)     O2 Sat, Arterial 83.8 (*)     All other components within normal limits    Narrative:     CALL  Randy FULLER tel. ,  heide phillips rn, 05/29/2021 14:28, by RORO   TROPONIN - Abnormal; Notable for the following components:    Troponin 0.13 (*)     All other components within

## 2021-05-29 NOTE — H&P
[address]    Patient: Rachel Armendariz   : 1970   MRN: [id number]  Code Status: Full Code  PCP: ROMINA Rodrigues  Date of Service: 2021    Chief Complaint:   Shortness of breath    History of Present Illness:   62-year-old female with past medical history as listed below presented to ER with shortness of breath. History of ESRD on HD. States compliance with HD today however noncompliant with home medications. Denies any further complaints. Work-up in ER demonstrated likely moderate-severe pulmonary edema per CT imaging.     Review of Systems:   A comprehensive review of systems was negative except for: Respiratory: positive for shortness of breath    Past Medical History:     Past Medical History:   Diagnosis Date    Arthritis     Chronic kidney disease, stage 5, kidney failure [address]     Closed fracture of right shoulder girdle, with routine healing, subsequent encounter     DM (diabetes mellitus) type II controlled with renal manifestation ([facility] 1993    Gastroparesis     GERD (gastroesophageal reflux disease)     Hemodialysis patient ([facility] [address] [facility]     dialysis on tues, thur, and sat at [facility]    Hypertension     Hypothyroid     Nasal congestion     recent    Noncompliance with medications     Palliative care patient 10/08/2019    Restless leg syndrome          Past Surgical History:     Past Surgical History:   Procedure Laterality Date    BREAST SURGERY Left     infected milk gland removed    BREAST SURGERY Right 2021    WEDGE EXCISION RIGHT BREAST DUCT WITH LACRIMAL DUCT PROBE, PEC BLOCK performed by Erika Jeffery MD at [address] [place] [address], [facility]          COLONOSCOPY Left 2020    Dr Deanna Del Toro hepatic flexure-Severe tortuosity with severe spasming, 1 yr recall    DIALYSIS FISTULA CREATION Left 2019    REVISION LEFT UPPER EXTREMITY AV ACCESS WITH LEFT BRACHIAL ARTERY TO LEFT AXILLARY VEIN WITH  INTERPOSITIONAL ARTEGRAFT   performed by Ar Hubbard MD at [address]  2012    [address] Right 1/25/2019    INSERTION OF RIGHT INTERNAL JUGULAR HEMODIALYSIS CATHETER performed by Ar Hubbard MD at [facility] [facility]  08/17/2018    1.  Moderately increased stainable iron identified by special stain in Kupffer cells and occasional hepatocytes. Negative for evidence of significant portal or lobular inflammation. Negative for evidence of significant fibrosis    CA COLONOSCOPY W/BIOPSY SINGLE/MULTIPLE N/A 10/20/2017    Dr Chrissy Stapleton prep-Tubular AP (-) dysplasia x 2--3 yr recall    CA EGD TRANSORAL BIOPSY SINGLE/MULTIPLE N/A 12/27/2016    Dr Carin Chan EGD TRANSORAL BIOPSY SINGLE/MULTIPLE N/A 10/20/2017    Dr Best Mg (-)   [address] [facility] VASCULAR SURGERY Left 2016    fistula by Dr Sandeep Lombardo at [address]  10/02/2017    [facility]. Left upper fistulograms/venograms, balloon angioplasty cephalic vein arch 21W49 conquest.    VASCULAR SURGERY  08/2018    FISTULOGRAM    VASCULAR SURGERY  10/29/2018    SJS. Left upper fistulograms/venograms    VASCULAR SURGERY  12/19/2018    SJS. Arch aortogram,left upper arteriograms.  VASCULAR SURGERY  03/06/2019    SJS. Removal of tunneled dialyis catheter right internal jugular vein.  VASCULAR SURGERY  08/28/2019    SJS. Left upper extremity fistulogram including venography to the superior vena cava. Balloon angioplasty left subclavian vein with 10mm x 40mm conquest balloon. Balloon angioplasty mid/proximal upper arm cephalic vein with 13HO x 40mm conquest balloon. Venograms after balloon angioplasty. Stent left mid/proximal upper arm cephalic vein stenosis fluency 10mm x 60mm self-expanding covered stent. Balloon     VASCULAR SURGERY  08/28/2019    cont angioplasty stent with 10mm x 40mm conquest balloon. Completion venograms left upper extremity.         Family History:     Family History Problem Relation Age of Onset    Colon Cancer Mother          at 63    Colon Polyps Mother     Lung Cancer Father          at 79    Colon Polyps Father     Stomach Cancer Sister     Breast Cancer Sister 27    Depression Daughter 25        committed suicide    Liver Cancer Neg Hx     Liver Disease Neg Hx     Esophageal Cancer Neg Hx     Rectal Cancer Neg Hx         Social History:     Social History     Socioeconomic History    Marital status: Single     Spouse name: Not on file    Number of children: 2    Years of education: Not on file    Highest education level: Not on file   Occupational History    Not on file   Tobacco Use    Smoking status: Current Every Day Smoker     Packs/day: 0.50     Years: 33.00     Pack years: 16.50     Types: Cigarettes    Smokeless tobacco: Never Used    Tobacco comment: NOW at 1/4 PD, started at age 24 and has averaged 2 PPD   Vaping Use    Vaping Use: Never used   Substance and Sexual Activity    Alcohol use: No    Drug use: Not Currently     Types: Marijuana    Sexual activity: Not Currently     Partners: Male   Other Topics Concern    Not on file   Social History Narrative    Not on file     Social Determinants of Health     Financial Resource Strain:     Difficulty of Paying Living Expenses:    Food Insecurity:     Worried About Running Out of Food in the Last Year:     Ran Out of Food in the Last Year:    Transportation Needs:     Lack of Transportation (Medical):      Lack of Transportation (Non-Medical):    Physical Activity:     Days of Exercise per Week:     Minutes of Exercise per Session:    Stress:     Feeling of Stress :    Social Connections:     Frequency of Communication with Friends and Family:     Frequency of Social Gatherings with Friends and Family:     Attends Temple Services:     Active Member of Clubs or Organizations:     Attends Club or Organization Meetings:     Marital Status:    Intimate Partner Violence:  Fear of Current or Ex-Partner:     Emotionally Abused:     Physically Abused:     Sexually Abused:        Prior to Admission Medications:   Not in a hospital admission. Allergies:      Allergies   Allergen Reactions    Penicillins Hives and Shortness Of Breath    Lamisil Advanced [Terbinafine] Nausea And Vomiting    Stadol [Butorphanol] Nausea And Vomiting     And made me feel crazy    Reglan [Metoclopramide] Other (See Comments)     Body twitching and crawling out of her skin feeling         Physical Exam:   BP (!) 194/81   Pulse 94   Temp 98.1 °F (36.7 °C) (Oral)   Resp 22   Ht 5' 6\" (1.676 m)   Wt 155 lb 6.8 oz (70.5 kg)   SpO2 93%   BMI 25.09 kg/m²     General: no acute distress  HEENT: normocephalic, atraumatic  Neck: supple, symmetrical, trachea midline   Lungs: bilateral crackles  Cardiovascular: s1 and s2 normal  Abdomen: soft, positive bowel sounds  Extremities: 1+ pitting edema in lower extremities bilaterally  Neuro: aaox3, no focal deficits   Skin: normal color and texture     Recent Results (from the past 72 hour(s))   CBC Auto Differential    Collection Time: 05/29/21  1:50 PM   Result Value Ref Range    WBC 9.0 4.8 - 10.8 K/uL    RBC 3.67 (L) 4.20 - 5.40 M/uL    Hemoglobin 10.7 (L) 12.0 - 16.0 g/dL    Hematocrit 34.0 (L) 37.0 - 47.0 %    MCV 92.6 81.0 - 99.0 fL    MCH 29.2 27.0 - 31.0 pg    MCHC 31.5 (L) 33.0 - 37.0 g/dL    RDW 17.1 (H) 11.5 - 14.5 %    Platelets 968 (L) 137 - 400 K/uL    MPV 9.6 9.4 - 12.3 fL    Neutrophils % 84.5 (H) 50.0 - 65.0 %    Lymphocytes % 6.2 (L) 20.0 - 40.0 %    Monocytes % 6.9 0.0 - 10.0 %    Eosinophils % 0.9 0.0 - 5.0 %    Basophils % 1.2 (H) 0.0 - 1.0 %    Neutrophils Absolute 7.6 (H) 1.5 - 7.5 K/uL    Immature Granulocytes # 0.0 K/uL    Lymphocytes Absolute 0.6 (L) 1.1 - 4.5 K/uL    Monocytes Absolute 0.60 0.00 - 0.90 K/uL    Eosinophils Absolute 0.10 0.00 - 0.60 K/uL    Basophils Absolute 0.10 0.00 - 0.20 K/uL   Comprehensive Metabolic Panel w/ Influenza A by PCR Not Detected Not Detected    Influenza B by PCR Not Detected Not Detected    Mycoplasma pneumoniae by PCR Not Detected Not Detected    Parainfluenza Virus 1 by PCR Not Detected Not Detected    Parainfluenza Virus 2 by PCR Not Detected Not Detected    Parainfluenza Virus 3 by PCR Not Detected Not Detected    Parainfluenza Virus 4 by PCR Not Detected Not Detected    Respiratory Syncytial Virus by PCR Not Detected Not Detected   BLOOD GAS, ARTERIAL    Collection Time: 05/29/21  2:26 PM   Result Value Ref Range    pH, Arterial 7.500 (H) 7.350 - 7.450    pCO2, Arterial 32.0 (L) 35.0 - 45.0 mmHg    pO2, Arterial 48.0 (LL) 80.0 - 100.0 mmHg    HCO3, Arterial 25.0 22.0 - 26.0 mmol/L    Base Excess, Arterial 2.2 (H) -2.0 - 2.0 mmol/L    Hemoglobin, Art, Extended 11.0 (L) 12.0 - 16.0 g/dL    O2 Sat, Arterial 83.8 (LL) >92 %    Carboxyhgb, Arterial 2.4 0.0 - 5.0 %    Methemoglobin, Arterial 0.6 <1.5 %    O2 Content, Arterial 13.0 Not Established mL/dL    O2 Therapy Unknown    Potassium, Whole Blood    Collection Time: 05/29/21  2:26 PM   Result Value Ref Range    Potassium, Whole Blood 3.9        No intake/output data recorded. XR CHEST PORTABLE    Result Date: 5/29/2021  Bilateral interstitial and airspace opacity. Favor moderate pulmonary edema but correlate with history and labs as infectious process is also within the differential. Signed by Dr Marshall Maradiaga on 5/29/2021 3:23 PM    CTA PULMONARY W CONTRAST    Result Date: 5/29/2021  1. No evidence of pulmonary embolus. 2.  Bilateral interlobular septal thickening, groundglass opacity, and consolidation. Findings are favored to represent moderate-severe pulmonary edema. 3.  Small pleural effusions. 4.  Diffuse body wall soft tissue edema. Signed by Dr Marshall Maradiaga on 5/29/2021 4:15 PM      Assessment and Plan:   Acute pulmonary edema/ESRD on HD  S/p HD today  Admits noncompliance with all home meds  CTA chest 5/29/2021:  Moderate-severe pulmonary

## 2021-05-29 NOTE — PROGRESS NOTES
Results for Rosangela Tom (MRN [id number]) as of 5/29/2021 14:31   Ref.  Range 5/29/2021 14:26   Hemoglobin, Art, Extended Latest Ref Range: 12.0 - 16.0 g/dL 11.0 (L)   pH, Arterial Latest Ref Range: 7.350 - 7.450  7.500 (H)   pCO2, Arterial Latest Ref Range: 35.0 - 45.0 mmHg 32.0 (L)   pO2, Arterial Latest Ref Range: 80.0 - 100.0 mmHg 48.0 (LL)   HCO3, Arterial Latest Ref Range: 22.0 - 26.0 mmol/L 25.0   Base Excess, Arterial Latest Ref Range: -2.0 - 2.0 mmol/L 2.2 (H)   O2 Sat, Arterial Latest Ref Range: >92 % 83.8 (LL)   O2 Content, Arterial Latest Ref Range: Not Established mL/dL 13.0   Methemoglobin, Arterial Latest Ref Range: <1.5 % 0.6   Carboxyhgb, Arterial Latest Ref Range: 0.0 - 5.0 % 2.4   Pt on 3 lpm nc, RR, AT+

## 2021-05-29 NOTE — PROGRESS NOTES
John Henry arrived to room # 152. Presented with: Acute hypoxia  Mental Status: Patient is oriented, alert, coherent, logical, thought processes intact and able to concentrate and follow conversation. Vitals:    05/29/21 1534   BP: (!) 194/81   Pulse:    Resp:    Temp:    SpO2: 93%     Patient safety contract and falls prevention contract reviewed with patient Yes. Oriented Patient to room. Call light within reach. Yes.   Needs, issues or concerns expressed at this time: no.      Electronically signed by Charan Lo RN on 5/29/2021 at 5:41 PM

## 2021-05-30 VITALS
WEIGHT: 155.42 LBS | RESPIRATION RATE: 16 BRPM | HEART RATE: 63 BPM | SYSTOLIC BLOOD PRESSURE: 114 MMHG | HEIGHT: 66 IN | TEMPERATURE: 97.6 F | DIASTOLIC BLOOD PRESSURE: 54 MMHG | BODY MASS INDEX: 24.98 KG/M2 | OXYGEN SATURATION: 94 %

## 2021-05-30 LAB
ANION GAP SERPL CALCULATED.3IONS-SCNC: 12 MMOL/L (ref 7–19)
BASOPHILS ABSOLUTE: 0.1 K/UL (ref 0–0.2)
BASOPHILS RELATIVE PERCENT: 1.9 % (ref 0–1)
BUN BLDV-MCNC: 44 MG/DL (ref 6–20)
CALCIUM SERPL-MCNC: 8.3 MG/DL (ref 8.6–10)
CHLORIDE BLD-SCNC: 96 MMOL/L (ref 98–111)
CO2: 27 MMOL/L (ref 22–29)
CREAT SERPL-MCNC: 3.7 MG/DL (ref 0.5–0.9)
EOSINOPHILS ABSOLUTE: 0.1 K/UL (ref 0–0.6)
EOSINOPHILS RELATIVE PERCENT: 1.9 % (ref 0–5)
GFR AFRICAN AMERICAN: 16
GFR NON-AFRICAN AMERICAN: 13
GLUCOSE BLD-MCNC: 101 MG/DL (ref 70–99)
GLUCOSE BLD-MCNC: 113 MG/DL (ref 70–99)
GLUCOSE BLD-MCNC: 125 MG/DL (ref 70–99)
GLUCOSE BLD-MCNC: 233 MG/DL (ref 70–99)
GLUCOSE BLD-MCNC: 37 MG/DL (ref 74–109)
GLUCOSE BLD-MCNC: 39 MG/DL (ref 70–99)
GLUCOSE BLD-MCNC: 65 MG/DL (ref 70–99)
GLUCOSE BLD-MCNC: 90 MG/DL (ref 70–99)
HBA1C MFR BLD: 9.8 % (ref 4–6)
HCT VFR BLD CALC: 30.3 % (ref 37–47)
HCT VFR BLD CALC: 31.8 % (ref 37–47)
HEMOGLOBIN: 9.2 G/DL (ref 12–16)
HEMOGLOBIN: 9.9 G/DL (ref 12–16)
IMMATURE GRANULOCYTES #: 0 K/UL
LYMPHOCYTES ABSOLUTE: 1.1 K/UL (ref 1.1–4.5)
LYMPHOCYTES RELATIVE PERCENT: 16.7 % (ref 20–40)
MAGNESIUM: 2.5 MG/DL (ref 1.6–2.6)
MCH RBC QN AUTO: 28.5 PG (ref 27–31)
MCHC RBC AUTO-ENTMCNC: 30.4 G/DL (ref 33–37)
MCV RBC AUTO: 93.8 FL (ref 81–99)
MONOCYTES ABSOLUTE: 0.8 K/UL (ref 0–0.9)
MONOCYTES RELATIVE PERCENT: 11.3 % (ref 0–10)
NEUTROPHILS ABSOLUTE: 4.6 K/UL (ref 1.5–7.5)
NEUTROPHILS RELATIVE PERCENT: 67.9 % (ref 50–65)
PARATHYROID HORMONE INTACT: 225.4 PG/ML (ref 15–65)
PDW BLD-RTO: 17.2 % (ref 11.5–14.5)
PERFORMED ON: ABNORMAL
PERFORMED ON: NORMAL
PHOSPHORUS: 3.6 MG/DL (ref 2.5–4.5)
PLATELET # BLD: 124 K/UL (ref 130–400)
PMV BLD AUTO: 9.4 FL (ref 9.4–12.3)
POTASSIUM REFLEX MAGNESIUM: 3.8 MMOL/L (ref 3.5–5)
RBC # BLD: 3.23 M/UL (ref 4.2–5.4)
SODIUM BLD-SCNC: 135 MMOL/L (ref 136–145)
TROPONIN: 0.13 NG/ML (ref 0–0.03)
VITAMIN D 25-HYDROXY: 39.3 NG/ML
WBC # BLD: 6.7 K/UL (ref 4.8–10.8)

## 2021-05-30 PROCEDURE — 82306 VITAMIN D 25 HYDROXY: CPT

## 2021-05-30 PROCEDURE — 6360000002 HC RX W HCPCS: Performed by: INTERNAL MEDICINE

## 2021-05-30 PROCEDURE — 83036 HEMOGLOBIN GLYCOSYLATED A1C: CPT

## 2021-05-30 PROCEDURE — 83970 ASSAY OF PARATHORMONE: CPT

## 2021-05-30 PROCEDURE — 85018 HEMOGLOBIN: CPT

## 2021-05-30 PROCEDURE — 84100 ASSAY OF PHOSPHORUS: CPT

## 2021-05-30 PROCEDURE — 2700000000 HC OXYGEN THERAPY PER DAY

## 2021-05-30 PROCEDURE — 6370000000 HC RX 637 (ALT 250 FOR IP): Performed by: INTERNAL MEDICINE

## 2021-05-30 PROCEDURE — 85025 COMPLETE CBC W/AUTO DIFF WBC: CPT

## 2021-05-30 PROCEDURE — 36415 COLL VENOUS BLD VENIPUNCTURE: CPT

## 2021-05-30 PROCEDURE — 94761 N-INVAS EAR/PLS OXIMETRY MLT: CPT

## 2021-05-30 PROCEDURE — 83735 ASSAY OF MAGNESIUM: CPT

## 2021-05-30 PROCEDURE — 82947 ASSAY GLUCOSE BLOOD QUANT: CPT

## 2021-05-30 PROCEDURE — 85014 HEMATOCRIT: CPT

## 2021-05-30 PROCEDURE — 80048 BASIC METABOLIC PNL TOTAL CA: CPT

## 2021-05-30 PROCEDURE — 84484 ASSAY OF TROPONIN QUANT: CPT

## 2021-05-30 RX ORDER — DEXTROSE MONOHYDRATE 50 MG/ML
100 INJECTION, SOLUTION INTRAVENOUS PRN
Status: DISCONTINUED | OUTPATIENT
Start: 2021-05-30 | End: 2021-05-30 | Stop reason: HOSPADM

## 2021-05-30 RX ORDER — NICOTINE POLACRILEX 4 MG
15 LOZENGE BUCCAL PRN
Status: DISCONTINUED | OUTPATIENT
Start: 2021-05-30 | End: 2021-05-30 | Stop reason: HOSPADM

## 2021-05-30 RX ORDER — DEXTROSE MONOHYDRATE 25 G/50ML
12.5 INJECTION, SOLUTION INTRAVENOUS PRN
Status: DISCONTINUED | OUTPATIENT
Start: 2021-05-30 | End: 2021-05-30 | Stop reason: HOSPADM

## 2021-05-30 RX ADMIN — LEVOTHYROXINE SODIUM 150 MCG: 75 TABLET ORAL at 06:41

## 2021-05-30 RX ADMIN — STANDARDIZED SENNA CONCENTRATE 8.6 MG: 8.6 TABLET ORAL at 08:03

## 2021-05-30 RX ADMIN — LACTULOSE 20 G: 20 SOLUTION ORAL at 13:43

## 2021-05-30 RX ADMIN — CLONIDINE HYDROCHLORIDE 0.3 MG: 0.1 TABLET ORAL at 13:43

## 2021-05-30 RX ADMIN — HEPARIN SODIUM 5000 UNITS: 5000 INJECTION INTRAVENOUS; SUBCUTANEOUS at 05:36

## 2021-05-30 RX ADMIN — CLONIDINE HYDROCHLORIDE 0.3 MG: 0.1 TABLET ORAL at 05:35

## 2021-05-30 RX ADMIN — NIFEDIPINE 60 MG: 60 TABLET, FILM COATED, EXTENDED RELEASE ORAL at 08:03

## 2021-05-30 RX ADMIN — HYDRALAZINE HYDROCHLORIDE 75 MG: 50 TABLET, FILM COATED ORAL at 13:43

## 2021-05-30 RX ADMIN — DULOXETINE 60 MG: 60 CAPSULE, DELAYED RELEASE ORAL at 09:08

## 2021-05-30 RX ADMIN — ATORVASTATIN CALCIUM 20 MG: 20 TABLET, FILM COATED ORAL at 08:02

## 2021-05-30 RX ADMIN — SEVELAMER CARBONATE 800 MG: 800 TABLET, FILM COATED ORAL at 11:37

## 2021-05-30 RX ADMIN — SEVELAMER CARBONATE 800 MG: 800 TABLET, FILM COATED ORAL at 17:45

## 2021-05-30 RX ADMIN — ISOSORBIDE MONONITRATE 120 MG: 60 TABLET, EXTENDED RELEASE ORAL at 08:02

## 2021-05-30 RX ADMIN — HEPARIN SODIUM 5000 UNITS: 5000 INJECTION INTRAVENOUS; SUBCUTANEOUS at 13:43

## 2021-05-30 RX ADMIN — METOPROLOL SUCCINATE 100 MG: 50 TABLET, EXTENDED RELEASE ORAL at 06:41

## 2021-05-30 RX ADMIN — LISINOPRIL 20 MG: 10 TABLET ORAL at 08:02

## 2021-05-30 RX ADMIN — LACTULOSE 20 G: 20 SOLUTION ORAL at 08:03

## 2021-05-30 RX ADMIN — SEVELAMER CARBONATE 800 MG: 800 TABLET, FILM COATED ORAL at 08:03

## 2021-05-30 RX ADMIN — HYDROCHLOROTHIAZIDE 50 MG: 25 TABLET ORAL at 08:02

## 2021-05-30 RX ADMIN — HYDRALAZINE HYDROCHLORIDE 10 MG: 20 INJECTION INTRAMUSCULAR; INTRAVENOUS at 00:13

## 2021-05-30 RX ADMIN — HYDRALAZINE HYDROCHLORIDE 75 MG: 50 TABLET, FILM COATED ORAL at 05:36

## 2021-05-30 ASSESSMENT — PAIN SCALES - GENERAL: PAINLEVEL_OUTOF10: 0

## 2021-05-30 NOTE — DISCHARGE INSTR - DIET

## 2021-05-30 NOTE — PROGRESS NOTES
Nephrology ([facility]) Consult Note      Patient:  Ruddy Navarrete  YOB: 1970  Date of Service: 5/30/2021  MRN: [id number]   Acct: [de-identified]   Primary Care Physician: ROMINA Taylor  Advance Directive: Full Code  Admit Date: 5/29/2021       Hospital Day: 1  Referring Provider: Yousif Woods MD    Patient independently seen and examined, Chart, Consults, Notes, Operative notes, Labs, Cardiology, and Radiology studies reviewed as available. Subjective:  Ruddy Navarrete is a 45 y.o. female  whom we were consulted for ESRD. [facility]. Went to HD apt today without issue per pt and developed worsening soa. Presented to ED where pulmonary edema was noted.  +soa/cough. No missed HD but hadn't had meds including clonidine today. No f/c/n/v/d/cp. Today, no overnight events. She is feeling much better. Up in chair. She is completely off any oxygen supplementation. She denies current chest pain, shortness of air at rest, nausea or vomiting. She is anxious for discharge.     Allergies:  Penicillins, Lamisil advanced [terbinafine], Stadol [butorphanol], and Reglan [metoclopramide]    Medicines:  Current Facility-Administered Medications   Medication Dose Route Frequency Provider Last Rate Last Admin    glucose (GLUTOSE) 40 % oral gel 15 g  15 g Oral PRN Yousif Woods MD        dextrose 50 % IV solution  12.5 g Intravenous PRN Yousif Woods MD        glucagon (rDNA) injection 1 mg  1 mg Intramuscular PRN Yousif Woods MD        dextrose 5 % solution  100 mL/hr Intravenous PRN Yousif Woods MD        insulin lispro (HUMALOG) injection vial 5 Units  5 Units Subcutaneous TID  Yousif Woods MD        hydrALAZINE (APRESOLINE) injection 10 mg  10 mg Intravenous Q6H PRN Donna Stephen MD   10 mg at 05/30/21 0013    labetalol (NORMODYNE;TRANDATE) injection 20 mg  20 mg Intravenous Q4H PRN Donna Stephen MD        cloNIDine (CATAPRES) tablet 0.3 mg  0.3 mg Oral 3 times per day Daniele Allen MD   0.3 mg at 05/30/21 1343    DULoxetine (CYMBALTA) extended release capsule 60 mg  60 mg Oral Daily Daniele Allen MD   60 mg at 05/30/21 0908    vitamin D (ERGOCALCIFEROL) capsule 50,000 Units  50,000 Units Oral Q14 Days Daniele Allen MD   50,000 Units at 05/29/21 1836    hydrALAZINE (APRESOLINE) tablet 75 mg  75 mg Oral 3 times per day Daniele Allen MD   75 mg at 05/30/21 1343    hydroCHLOROthiazide (HYDRODIURIL) tablet 50 mg  50 mg Oral Daily Daniele Allen MD   50 mg at 05/30/21 0802    HYDROcodone-acetaminophen (Ad Silver) 5-325 MG per tablet 1 tablet  1 tablet Oral Q6H PRN Daniele Allen MD        insulin glargine (LANTUS) injection vial 40 Units  40 Units Subcutaneous Nightly Daniele Allen MD   40 Units at 05/29/21 2026    isosorbide mononitrate (IMDUR) extended release tablet 120 mg  120 mg Oral Daily Daniele Allen MD   120 mg at 05/30/21 0802    lactulose (CHRONULAC) 10 GM/15ML solution 20 g  20 g Oral TID Daniele Allen MD   20 g at 05/30/21 1343    levothyroxine (SYNTHROID) tablet 150 mcg  150 mcg Oral Daily Daniele Allen MD   150 mcg at 05/30/21 0641    lisinopril (PRINIVIL;ZESTRIL) tablet 20 mg  20 mg Oral BID Daniele Allen MD   20 mg at 05/30/21 0802    metoprolol succinate (TOPROL XL) extended release tablet 100 mg  100 mg Oral QAM AC Daniele Allen MD   100 mg at 05/30/21 0641    NIFEdipine (PROCARDIA XL) extended release tablet 60 mg  60 mg Oral Daily Daniele Allen MD   60 mg at 05/30/21 0803    rOPINIRole (REQUIP) tablet 4 mg  4 mg Oral Nightly Daniele Allen MD   4 mg at 05/29/21 2026    senna (SENOKOT) tablet 8.6 mg  1 tablet Oral Daily Daniele Allen MD   8.6 mg at 05/30/21 0803    sevelamer (RENVELA) tablet 800 mg  800 mg Oral TID  Daniele Allen MD   800 mg at 05/30/21 1137    atorvastatin (LIPITOR) tablet 20 mg  20 mg Oral Daily Daniele Allen MD   20 mg at 05/30/21 0802    acetaminophen (TYLENOL) tablet 650 mg  650 mg Oral Q6H PRN Jade Denny MD        Or    acetaminophen (TYLENOL) suppository 650 mg  650 mg Rectal Q6H PRN Jade Denny MD        heparin (porcine) injection 5,000 Units  5,000 Units Subcutaneous 3 times per day Jade Denny MD   5,000 Units at 05/30/21 1343    insulin lispro (HUMALOG) injection vial 0-12 Units  0-12 Units Subcutaneous TID WC Jade Denny MD   10 Units at 05/29/21 1848    insulin lispro (HUMALOG) injection vial 0-6 Units  0-6 Units Subcutaneous Nightly Jade Denny MD   3 Units at 05/29/21 2027    albuterol (PROVENTIL) nebulizer solution 2.5 mg  2.5 mg Nebulization Q6H PRN Jade Denny MD           Past Medical History:  Past Medical History:   Diagnosis Date    Arthritis     Chronic kidney disease, stage 5, kidney failure ([address]     Closed fracture of right shoulder girdle, with routine healing, subsequent encounter     DM (diabetes mellitus) type II controlled with renal manifestation ([facility] [address] [facility] 06/1993    Gastroparesis     GERD (gastroesophageal reflux disease)     Hemodialysis patient ([facility] [address] [facility]     dialysis on tues, thur, and sat at [facility] clinic    Hypertension     Hypothyroid     Nasal congestion     recent    Noncompliance with medications     Palliative care patient 10/08/2019    Restless leg syndrome        Past Surgical History:  Past Surgical History:   Procedure Laterality Date    BREAST SURGERY Left 2008    infected milk gland removed    BREAST SURGERY Right 5/25/2021    WEDGE EXCISION RIGHT BREAST DUCT WITH LACRIMAL DUCT PROBE, PEC BLOCK performed by Vida Kurtz MD at [facility], LAPAROSCOPIC      2008    COLONOSCOPY Left 9/17/2020    Dr Amando Richards hepatic flexure-Severe tortuosity with severe spasming, 1 yr recall    DIALYSIS FISTULA CREATION Left 1/25/2019    REVISION LEFT UPPER EXTREMITY AV ACCESS WITH LEFT BRACHIAL ARTERY TO LEFT AXILLARY VEIN WITH  INTERPOSITIONAL ARTEGRAFT   performed by Elly Whipple MD at [address] ENDOMETRIAL ABLATION      HC DIALYSIS CATHETER Right 2019    INSERTION OF RIGHT INTERNAL JUGULAR HEMODIALYSIS CATHETER performed by Helen Siddiqi MD at [facility] LIVER BIOPSY  2018    1.  Moderately increased stainable iron identified by special stain in Kupffer cells and occasional hepatocytes. Negative for evidence of significant portal or lobular inflammation. Negative for evidence of significant fibrosis    MA COLONOSCOPY W/BIOPSY SINGLE/MULTIPLE N/A 10/20/2017    Dr Meliton Caldwell prep-Tubular AP (-) dysplasia x 2--3 yr recall    MA EGD TRANSORAL BIOPSY SINGLE/MULTIPLE N/A 2016    Dr Carolynn Beal EGD TRANSORAL BIOPSY SINGLE/MULTIPLE N/A 10/20/2017    Dr Fercho Aguilar (-)   [address] [facility] VASCULAR SURGERY Left 2016    fistula by Dr João Fraser at [address]  10/02/2017    [facility]. Left upper fistulograms/venograms, balloon angioplasty cephalic vein arch 35I72 conquest.    VASCULAR SURGERY  2018    FISTULOGRAM    VASCULAR SURGERY  10/29/2018    [facility]. Left upper fistulograms/venograms    VASCULAR SURGERY  2018    [facility]. Arch aortogram,left upper arteriograms.  VASCULAR SURGERY  2019    [facility]. Removal of tunneled dialyis catheter right internal jugular vein.  VASCULAR SURGERY  2019    [facility]. Left upper extremity fistulogram including venography to the superior vena cava. Balloon angioplasty left subclavian vein with 10mm x 40mm conquest balloon. Balloon angioplasty mid/proximal upper arm cephalic vein with 68EI x 40mm conquest balloon. Venograms after balloon angioplasty. Stent left mid/proximal upper arm cephalic vein stenosis fluency 10mm x 60mm self-expanding covered stent. Balloon     VASCULAR SURGERY  2019    cont angioplasty stent with 10mm x 40mm conquest balloon. Completion venograms left upper extremity.        Family History  Family History   Problem Relation Age of Onset    Colon Cancer Mother          at 61    Colon Polyps Mother    Fabiano Beebe Father          at 66    Colon Polyps Father     Stomach Cancer Sister     Breast Cancer Sister 27    Depression Daughter 25        committed suicide    Liver Cancer Neg Hx     Liver Disease Neg Hx     Esophageal Cancer Neg Hx     Rectal Cancer Neg Hx        Social History  Social History     Socioeconomic History    Marital status: Single     Spouse name: Not on file    Number of children: 2    Years of education: Not on file    Highest education level: Not on file   Occupational History    Not on file   Tobacco Use    Smoking status: Current Every Day Smoker     Packs/day: 0.50     Years: 33.00     Pack years: 16.50     Types: Cigarettes    Smokeless tobacco: Never Used    Tobacco comment: NOW at 1/4 PD, started at age 24 and has averaged 2 PPD   Vaping Use    Vaping Use: Never used   Substance and Sexual Activity    Alcohol use: No    Drug use: Not Currently     Types: Marijuana    Sexual activity: Not Currently     Partners: Male   Other Topics Concern    Not on file   Social History Narrative    Not on file     Social Determinants of Health     Financial Resource Strain:     Difficulty of Paying Living Expenses:    Food Insecurity:     Worried About Running Out of Food in the Last Year:     Ran Out of Food in the Last Year:    Transportation Needs:     Lack of Transportation (Medical):      Lack of Transportation (Non-Medical):    Physical Activity:     Days of Exercise per Week:     Minutes of Exercise per Session:    Stress:     Feeling of Stress :    Social Connections:     Frequency of Communication with Friends and Family:     Frequency of Social Gatherings with Friends and Family:     Attends [employer] Services:     Active Member of Clubs or Organizations:     Attends Club or Organization Meetings:     Marital Status:    Intimate Partner Violence:     Fear of Current or Ex-Partner:     Emotionally Abused:     Physically Abused:     05/29/21 1930 (!) 175/67 -- -- 84 22 96 %   05/29/21 1915 (!) 189/68 -- -- 86 (!) 4 97 %   05/29/21 1900 (!) 211/74 -- -- 87 (!) 4 96 %       Intake/Output Summary (Last 24 hours) at 5/30/2021 1713  Last data filed at 5/30/2021 1321  Gross per 24 hour   Intake 2148 ml   Output 0 ml   Net 2148 ml     General: awake/alert   Chest: bibasilar crackles  CVS: regular rate and rhythm  Abdominal: soft, nontender, normal bowel sounds  Extremities: tr ble edema  Skin: warm and dry without rash      Labs:  BMP:   Recent Labs     05/29/21  1350 05/29/21  1426 05/30/21  0248   *  --  135*   K 4.0 3.9 3.8   CL 91*  --  96*   CO2 23  --  27   PHOS  --   --  3.6   BUN 29*  --  44*   CREATININE 2.5*  --  3.7*   CALCIUM 8.5*  --  8.3*     CBC:   Recent Labs     05/29/21  1350 05/30/21  0248 05/30/21  1453   WBC 9.0 6.7  --    HGB 10.7* 9.2* 9.9*   HCT 34.0* 30.3* 31.8*   MCV 92.6 93.8  --    * 124*  --      LIVER PROFILE:   Recent Labs     05/29/21  1350   AST 90*   ALT 68*   BILITOT 0.5   ALKPHOS 179*     PT/INR:   Recent Labs     05/29/21  1350   PROTIME 13.7   INR 1.06     APTT: No results for input(s): APTT in the last 72 hours. BNP:  No results for input(s): BNP in the last 72 hours. Ionized Calcium:No results for input(s): IONCA in the last 72 hours. Magnesium:  Recent Labs     05/30/21  0248   MG 2.5     Phosphorus:  Recent Labs     05/30/21  0248   PHOS 3.6     HgbA1C:   Recent Labs     05/30/21  0248   LABA1C 9.8*     Hepatic:   Recent Labs     05/29/21  1350   ALKPHOS 179*   ALT 68*   AST 90*   PROT 7.4   BILITOT 0.5   LABALBU 3.8     Lactic Acid:   Recent Labs     05/29/21  1350   LACTA 0.8     Troponin: No results for input(s): CKTOTAL, CKMB, TROPONINT in the last 72 hours. ABGs: No results for input(s): PH, PCO2, PO2, HCO3, O2SAT in the last 72 hours. CRP:  No results for input(s): CRP in the last 72 hours. Sed Rate:  No results for input(s): SEDRATE in the last 72 hours.       Cultures:   No results for input(s): CULTURE in the last 72 hours. Recent Labs     05/29/21  1350   BC No Growth to date. Any change in status will be called. No results for input(s): CXSURG in the last 72 hours. Radiology reports as per the Radiologist  Radiology: XR CHEST PORTABLE    Result Date: 5/29/2021  Exam: XR CHEST PORTABLE - 5/29/2021 1:55 PM Indication: Shortness of air Comparison: 10/24/2020 Findings: Cardiac silhouette is mildly enlarged but stable. Central vascular congestion and bilateral interstitial and airspace opacity. No large pleural effusion. No visible pneumothorax. LEFT subclavian/axillary graft. No acute osseous finding. Bilateral interstitial and airspace opacity. Favor moderate pulmonary edema but correlate with history and labs as infectious process is also within the differential. Signed by Dr Padmini Hightower on 5/29/2021 3:23 PM    CTA PULMONARY W CONTRAST    Result Date: 5/29/2021  Exam: CT chest angiography with 3D MIP images with IV contrast - 5/29/2021 2:50 PM Indication: Shortness of air, chest pain, hemoptysis Comparison: 4/29/2021 DLP: 676 mGy cm. In order to have a CT radiation dose as low as reasonably achievable, Automated Exposure Control was utilized for adjustment of the mA and/or KV according to patient size. Findings: No evidence of pulmonary embolus. Main, artery is at the at the upper limits of normal in caliber. Thoracic aorta is nonaneurysmal. No evidence of aortic dissection. Heart is mildly enlarged. No pericardial effusion. Central airways are clear. Small bilateral pleural effusions. Bilateral interlobular septal thickening with patchy groundglass opacity and consolidation. No pneumothorax. No enlarged axillary or supraclavicular lymph nodes. Mild mediastinal adenopathy is likely reactive. There is a RIGHT posterior thyroid nodule. Diffuse body wall soft tissue edema. Small amount of soft tissue gas in the medial RIGHT breast soft tissues, related to recent procedure.  No acute finding in the partially imaged upper abdomen. LEFT subclavian vein stent. 1.  No evidence of pulmonary embolus. 2.  Bilateral interlobular septal thickening, groundglass opacity, and consolidation. Findings are favored to represent moderate-severe pulmonary edema. 3.  Small pleural effusions. 4.  Diffuse body wall soft tissue edema. Signed by Dr Marshall Maradiaga on 5/29/2021 4:15 PM       Assessment   ESRD  DM2 with nephropathy on long-term insulin  HTN  Hyponatremia  Secondary Hyperparathyroidism  Anemia CKD  Seizure disorder  Hypothyroidism  Acute hypoxemic respiratory failure  Acute pulmonary edema    Plan:  HD TTS with extra treatment planned for tomorrow in the outpatient clinic already. Reassess for need for extra urgent treatment in AM  Monitor labs  D/w pt/nursing  Encouraged compliance with meds/hd  CTA demonstrated no PE  If patient remains inpatient overnight will do extra treatment tomorrow May 31, if able to be discharged and she can follow-up with her treatment as an outpatient on the same day then resume normal Tuesday Thursday Saturday schedule    Thank you for the consult, we appreciate the opportunity to provide care to your patients. Feel free to contact me if I can be of any further assistance.       Celsa Bear MD  05/30/21  5:13 PM

## 2021-05-30 NOTE — FLOWSHEET NOTE
05/29/21 2020   Provider Notification   Reason for Communication Critical Value (comment)  (trop. 0.11)   Provider Name eveline   Provider Notification Physician   Method of Communication Other (Comment)   Response Other (Comment)   Notification Time 2020   not notified at this time. Trop unchanged from previous draw. Pt is pain free.  Electronically signed by Fam Delcid RN on 5/30/2021 at 3:55 AM

## 2021-05-30 NOTE — CONSULTS
2027    albuterol (PROVENTIL) nebulizer solution 2.5 mg  2.5 mg Nebulization Q6H PRN Corina Jones MD           Past Medical History:  Past Medical History:   Diagnosis Date    Arthritis     Chronic kidney disease, stage 5, kidney failure (HCC)     Closed fracture of right shoulder girdle, with routine healing, subsequent encounter     DM (diabetes mellitus) type II controlled with renal manifestation ([facility] [address] 06/1993    Gastroparesis     GERD (gastroesophageal reflux disease)     Hemodialysis patient ([facility] [address]     dialysis on tues, thur, and sat at [facility]    Hypertension     Hypothyroid     Nasal congestion     recent    Noncompliance with medications     Palliative care patient 10/08/2019    Restless leg syndrome        Past Surgical History:  Past Surgical History:   Procedure Laterality Date    BREAST SURGERY Left 2008    infected milk gland removed    BREAST SURGERY Right 5/25/2021    WEDGE EXCISION RIGHT BREAST DUCT WITH LACRIMAL DUCT PROBE, PEC BLOCK performed by María Davey MD at [facility], LAPAROSCOPIC      2008    COLONOSCOPY Left 9/17/2020    Dr Elisa Hammonds hepatic flexure-Severe tortuosity with severe spasming, 1 yr recall    DIALYSIS FISTULA CREATION Left 1/25/2019    REVISION LEFT UPPER EXTREMITY AV ACCESS WITH LEFT BRACHIAL ARTERY TO LEFT AXILLARY VEIN WITH  INTERPOSITIONAL ARTEGRAFT   performed by Ru Leiva MD at [address]  2012    [address] Right 1/25/2019    INSERTION OF RIGHT INTERNAL JUGULAR HEMODIALYSIS CATHETER performed by Ru Leiva MD at [address] [facility]  08/17/2018    1.  Moderately increased stainable iron identified by special stain in Kupffer cells and occasional hepatocytes. Negative for evidence of significant portal or lobular inflammation.  Negative for evidence of significant fibrosis    [place] COLONOSCOPY W/BIOPSY SINGLE/MULTIPLE N/A 10/20/2017    Dr Benton Sanchez prep-Tubular AP (-) dysplasia x 2--3 yr recall    [place] EGD TRANSORAL BIOPSY SINGLE/MULTIPLE N/A 2016    Dr Vanessa Hewitt EGD TRANSORAL BIOPSY SINGLE/MULTIPLE N/A 10/20/2017    Dr Tracie Chaves (-)    TUBAL LIGATION          VASCULAR SURGERY Left 2016    fistula by Dr Miryam Luther at [address]  10/02/2017    [facility]. Left upper fistulograms/venograms, balloon angioplasty cephalic vein arch [id number] conquest.    VASCULAR SURGERY  2018    FISTULOGRAM    VASCULAR SURGERY  10/29/2018    [facility]. Left upper fistulograms/venograms    VASCULAR SURGERY  2018    [facility]. Arch aortogram,left upper arteriograms.  VASCULAR SURGERY  2019    [facility]. Removal of tunneled dialyis catheter right internal jugular vein.  VASCULAR SURGERY  2019    [facility]. Left upper extremity fistulogram including venography to the superior vena cava. Balloon angioplasty left subclavian vein with 10mm x 40mm conquest balloon. Balloon angioplasty mid/proximal upper arm cephalic vein with 43FQ x 40mm conquest balloon. Venograms after balloon angioplasty. Stent left mid/proximal upper arm cephalic vein stenosis fluency 10mm x 60mm self-expanding covered stent. Balloon     VASCULAR SURGERY  2019    cont angioplasty stent with 10mm x 40mm conquest balloon. Completion venograms left upper extremity.        Family History  Family History   Problem Relation Age of Onset    Colon Cancer Mother          at 63    Colon Polyps Mother     Lung Cancer Father          at 79    Colon Polyps Father     Stomach Cancer Sister     Breast Cancer Sister 27    Depression Daughter 25        committed suicide    Liver Cancer Neg Hx     Liver Disease Neg Hx     Esophageal Cancer Neg Hx     Rectal Cancer Neg Hx        Social History  Social History     Socioeconomic History    Marital status: Single     Spouse name: Not on file    Number of children: 2    Years of education: Not on file    Highest education level: Not on file   Occupational History    Not on file   Tobacco Use    Smoking status: Current Every Day Smoker     Packs/day: 0.50     Years: 33.00     Pack years: 16.50     Types: Cigarettes    Smokeless tobacco: Never Used    Tobacco comment: NOW at 1/4 PD, started at age 24 and has averaged 2 PPD   Vaping Use    Vaping Use: Never used   Substance and Sexual Activity    Alcohol use: No    Drug use: Not Currently     Types: Marijuana    Sexual activity: Not Currently     Partners: Male   Other Topics Concern    Not on file   Social History Narrative    Not on file     Social Determinants of Health     Financial Resource Strain:     Difficulty of Paying Living Expenses:    Food Insecurity:     Worried About Running Out of Food in the Last Year:     Ran Out of Food in the Last Year:    Transportation Needs:     Lack of Transportation (Medical):  Lack of Transportation (Non-Medical):    Physical Activity:     Days of Exercise per Week:     Minutes of Exercise per Session:    Stress:     Feeling of Stress :    Social Connections:     Frequency of Communication with Friends and Family:     Frequency of Social Gatherings with Friends and Family:     Attends Restorationism Services:     Active Member of Clubs or Organizations:     Attends Club or Organization Meetings:     Marital Status:    Intimate Partner Violence:     Fear of Current or Ex-Partner:     Emotionally Abused:     Physically Abused:     Sexually Abused:          Review of Systems:  History obtained from chart review and the patient  General ROS: No fever or chills  Respiratory ROS: +cough, shortness of breath  Cardiovascular ROS: No chest pain or palpitations  Gastrointestinal ROS: No abdominal pain or melena  Genito-Urinary ROS: No dysuria or hematuria  Musculoskeletal ROS: No joint pain or swelling   14 point ROS reviewed with the patient and negative except as noted above and in the HPI unless unable to obtain.     Objective:  Patient Vitals for the past 24 hrs:   BP Temp Temp src Pulse Resp SpO2 Height Weight   05/29/21 2200 (!) 155/56 -- -- 80 23 93 % -- --   05/29/21 2130 (!) 150/62 -- -- 79 20 93 % -- --   05/29/21 2030 (!) 181/68 97.7 °F (36.5 °C) Tympanic 81 22 95 % -- --   05/29/21 2015 (!) 175/66 -- -- 80 11 95 % -- --   05/29/21 2000 (!) 180/61 -- -- 82 18 95 % -- --   05/29/21 1945 (!) 176/69 -- -- 80 22 97 % -- --   05/29/21 1930 (!) 175/67 -- -- 84 22 96 % -- --   05/29/21 1915 (!) 189/68 -- -- 86 (!) 4 97 % -- --   05/29/21 1900 (!) 211/74 -- -- 87 (!) 4 96 % -- --   05/29/21 1534 (!) 194/81 -- -- -- -- 93 % -- --   05/29/21 1507 (!) 189/82 -- -- 94 22 (!) 89 % -- --   05/29/21 1320 (!) 184/75 98.1 °F (36.7 °C) Oral 96 20 93 % 5' 6\" (1.676 m) 155 lb 6.8 oz (70.5 kg)     No intake or output data in the 24 hours ending 05/29/21 2306  General: awake/alert   Chest: bibasilar crackles  CVS: regular rate and rhythm  Abdominal: soft, nontender, normal bowel sounds  Extremities: tr ble edema  Skin: warm and dry without rash      Labs:  BMP:   Recent Labs     05/29/21  1350 05/29/21  1426   *  --    K 4.0 3.9   CL 91*  --    CO2 23  --    BUN 29*  --    CREATININE 2.5*  --    CALCIUM 8.5*  --      CBC:   Recent Labs     05/29/21  1350   WBC 9.0   HGB 10.7*   HCT 34.0*   MCV 92.6   *     LIVER PROFILE:   Recent Labs     05/29/21  1350   AST 90*   ALT 68*   BILITOT 0.5   ALKPHOS 179*     PT/INR:   Recent Labs     05/29/21  1350   PROTIME 13.7   INR 1.06     APTT: No results for input(s): APTT in the last 72 hours. BNP:  No results for input(s): BNP in the last 72 hours. Ionized Calcium:No results for input(s): IONCA in the last 72 hours. Magnesium:No results for input(s): MG in the last 72 hours. Phosphorus:No results for input(s): PHOS in the last 72 hours. HgbA1C: No results for input(s): LABA1C in the last 72 hours.   Hepatic:   Recent Labs     05/29/21  1350   ALKPHOS 179*   ALT 68*   AST 90*   PROT 7.4   BILITOT 0.5   LABALBU 3.8     Lactic Acid:   Recent Labs 05/29/21  1350   LACTA 0.8     Troponin: No results for input(s): CKTOTAL, CKMB, TROPONINT in the last 72 hours. ABGs: No results for input(s): PH, PCO2, PO2, HCO3, O2SAT in the last 72 hours. CRP:  No results for input(s): CRP in the last 72 hours. Sed Rate:  No results for input(s): SEDRATE in the last 72 hours. Cultures:   No results for input(s): CULTURE in the last 72 hours. No results for input(s): BC, Raquel Longest in the last 72 hours. No results for input(s): CXSURG in the last 72 hours. Radiology reports as per the Radiologist  Radiology: XR CHEST PORTABLE    Result Date: 5/29/2021  Exam: XR CHEST PORTABLE - 5/29/2021 1:55 PM Indication: Shortness of air Comparison: 10/24/2020 Findings: Cardiac silhouette is mildly enlarged but stable. Central vascular congestion and bilateral interstitial and airspace opacity. No large pleural effusion. No visible pneumothorax. LEFT subclavian/axillary graft. No acute osseous finding. Bilateral interstitial and airspace opacity. Favor moderate pulmonary edema but correlate with history and labs as infectious process is also within the differential. Signed by Dr Moshe Irvin on 5/29/2021 3:23 PM    CTA PULMONARY W CONTRAST    Result Date: 5/29/2021  Exam: CT chest angiography with 3D MIP images with IV contrast - 5/29/2021 2:50 PM Indication: Shortness of air, chest pain, hemoptysis Comparison: 4/29/2021 DLP: 676 mGy cm. In order to have a CT radiation dose as low as reasonably achievable, Automated Exposure Control was utilized for adjustment of the mA and/or KV according to patient size. Findings: No evidence of pulmonary embolus. Main, artery is at the at the upper limits of normal in caliber. Thoracic aorta is nonaneurysmal. No evidence of aortic dissection. Heart is mildly enlarged. No pericardial effusion. Central airways are clear. Small bilateral pleural effusions.  Bilateral interlobular septal thickening with patchy groundglass opacity and

## 2021-05-30 NOTE — CONSULTS
**Physician Signature**  This document was electronically signed by: Almaz Zhang MD  2021   10:12 PM    **Consult Information**  Member Facility: [address] [place] [address] MRN: [id number]  Visit/Encounter Number: [id number]  Consult ID: [id number]  Facility Time Zone: CT  Date and Time of Request: 2021 09:15 PM  Requesting Clinician: Ricky Eduardo MD  Patient Name: Lesly Metcalf  YOB: 1970  Gender: Female  Patient identity was confirmed at the beginning of the consult with the   patient/family/staff using two personal identifiers: Patient name and       **Admission**  Admission Date: 2021  Chief reason for ICU admission: Respiratory Failure    **Core Metrics**  General orienting sentence for patient: 46 y/o with ESRD, was had HD, and had   dyspnea. Was placed on BiPAP. Was hypertensive on arrival. Blood sugars   high. BNP 28347. BP now better. HD finished today.   Chief physiologic deterioration: None - Stable patient  Is the patient on DVT prophylaxis?: Yes  Is the patient on GI prophylaxis?: Yes  Has this patient reached their nutritional goal?: Yes  Are there current issues with pain management in this patient?:   No  Are there issues with skin integrity?: No  Are there issues with delirium?: No  Has the patient been mobilized?: No  Is this patient currently intubated?: No  Are there ethical or care philosophy or family issues?: No  Do you recommend an in depth evaluation?: No  Do you recommend the patient should: : Continue ICU level of care

## 2021-05-30 NOTE — PROGRESS NOTES
4 Eyes Skin Assessment    Cici Uriarte is being assessed upon:Transfer to New Unit    I agree that I, Singh Mckeon RN, along with Manda Armendariz (either 2 RN's or 1 LPN and 1 RN) have performed a thorough Head to Toe Skin Assessment on the patient. ALL assessment sites listed below have been assessed. Areas assessed by both nurses:     [x]   Head, Face, and Ears   [x]   Shoulders, Back, and Chest  [x]   Arms, Elbows, and Hands   [x]   Coccyx, Sacrum, and Ischium  [x]   Legs, Feet, and Heels    Does the Patient have Skin Breakdown?  No    Marek Prevention initiated: Yes  Wound Care Orders initiated: No    WOC nurse consulted for Pressure Injury (Stage 3,4, Unstageable, DTI, NWPT, and Complex wounds) and New or Established Ostomies: NA        Primary Nurse eSignature: Singh Mckeon RN on 5/30/2021 at 10:47 AM      Co-Signer eSignature: Electronically signed by Manda Armendariz RN on 5/30/21 at 11:24 AM CDT

## 2021-05-30 NOTE — PROGRESS NOTES
spo2 on ra @ rest - 98%  spo2 on ra with exertion - 97%  Pt does not qualify for home o2 @ this time.

## 2021-05-30 NOTE — PROGRESS NOTES
Progress Note  Date:2021       Room:0152/152-01  Patient Name:Rebecca A Rhoda Cranker     YOB: 1970     Age:51 y.o.    1st interaction with patient 2021 as patient has been stepdown from ICU attending. Subjective    Subjective   Patient seen and examined remains in ICU room 152. Patient is conversational awake alert and oriented x3. States her shortness of breath is vastly improved. Case reviewed with nursing staff. Awaiting decision on possible dialysis today or resumption of current  schedule. Patient will be transition to medical floor. Patient denies any headache, change in vision, chest pain, abdominal pain, fevers or chills. Cumulative Hospital course: Patient was admitted 1129, 59-year-old  female with a past medical history of ESRD on maintenance dialysis, noncompliance with several home medications, uncontrolled type 2 diabetes, patient presented to the emergency room with severe shortness of breath, work-up in the emergency room revealed moderate to severe pulmonary edema per CT imaging. Nephrology services were consulted for possible emergent dialysis needs. Patient was placed on BiPAP, as well as nasal cannula oxygen. Improvement seen in respiratory status. CTA chest was obtained which ruled out pulmonary embolus, there were findings of bilateral interlobar septal thickening, groundglass opacities and consolidation. Small pleural effusions, diffuse body wall soft tissue edema. Continue to renally dose medications as well as avoid nephrotoxic agents. Review of Systems   ROS: 14 point review of systems is negative except as specifically addressed above.   Objective         Vitals Last 24 Hours:  TEMPERATURE:  Temp  Av.4 °F (36.3 °C)  Min: 96.9 °F (36.1 °C)  Max: 98.1 °F (36.7 °C)  RESPIRATIONS RANGE: Resp  Av.4  Min: 4  Max: 47  PULSE OXIMETRY RANGE: SpO2  Av.7 %  Min: 89 %  Max: 100 %  PULSE RANGE: Pulse  Av.9 Min: 59  Max: 96  BLOOD PRESSURE RANGE: Systolic (62YTP), GVV:883 , Min:122 , LNP:063   ; Diastolic (54FFP), KFK:33, Min:55, Max:82    I/O (24Hr): Intake/Output Summary (Last 24 hours) at 5/30/2021 1013  Last data filed at 5/30/2021 0856  Gross per 24 hour   Intake 1908 ml   Output 0 ml   Net 1908 ml     Physical Exam  Vitals reviewed. Constitutional:       Comments: Fatigued appearing   HENT:      Head: Normocephalic and atraumatic. Nose: Nose normal.   Eyes:      General:         Right eye: No discharge. Left eye: No discharge. Conjunctiva/sclera: Conjunctivae normal.   Cardiovascular:      Rate and Rhythm: Normal rate and regular rhythm. Pulses: Normal pulses. Pulmonary:      Comments: Nasal cannula oxygen in place  Abdominal:      Tenderness: There is no abdominal tenderness. There is no guarding or rebound. Musculoskeletal:      Cervical back: Normal range of motion. Right lower leg: No edema. Left lower leg: No edema. Skin:     General: Skin is warm. Neurological:      Mental Status: She is alert and oriented to person, place, and time. Mental status is at baseline. Psychiatric:         Mood and Affect: Mood normal.         Labs/Imaging/Diagnostics    Labs:  CBC:  Recent Labs     05/29/21  1350 05/30/21  0248   WBC 9.0 6.7   RBC 3.67* 3.23*   HGB 10.7* 9.2*   HCT 34.0* 30.3*   MCV 92.6 93.8   RDW 17.1* 17.2*   * 124*     CHEMISTRIES:  Recent Labs     05/29/21  1350 05/29/21  1426 05/30/21  0248   *  --  135*   K 4.0 3.9 3.8   CL 91*  --  96*   CO2 23  --  27   BUN 29*  --  44*   CREATININE 2.5*  --  3.7*   GLUCOSE 399*  --  37*   PHOS  --   --  3.6   MG  --   --  2.5     PT/INR:  Recent Labs     05/29/21  1350   PROTIME 13.7   INR 1.06     APTT:No results for input(s): APTT in the last 72 hours.   LIVER PROFILE:  Recent Labs     05/29/21  1350   AST 90*   ALT 68*   BILITOT 0.5   ALKPHOS 179*       Imaging Last 24 Hours:  XR CHEST PORTABLE    Result Date: 5/29/2021  Exam: XR CHEST PORTABLE - 5/29/2021 1:55 PM Indication: Shortness of air Comparison: 10/24/2020 Findings: Cardiac silhouette is mildly enlarged but stable. Central vascular congestion and bilateral interstitial and airspace opacity. No large pleural effusion. No visible pneumothorax. LEFT subclavian/axillary graft. No acute osseous finding. Bilateral interstitial and airspace opacity. Favor moderate pulmonary edema but correlate with history and labs as infectious process is also within the differential. Signed by Dr Janelle Parmar on 5/29/2021 3:23 PM    CTA PULMONARY W CONTRAST    Result Date: 5/29/2021  Exam: CT chest angiography with 3D MIP images with IV contrast - 5/29/2021 2:50 PM Indication: Shortness of air, chest pain, hemoptysis Comparison: 4/29/2021 DLP: 676 mGy cm. In order to have a CT radiation dose as low as reasonably achievable, Automated Exposure Control was utilized for adjustment of the mA and/or KV according to patient size. Findings: No evidence of pulmonary embolus. Main, artery is at the at the upper limits of normal in caliber. Thoracic aorta is nonaneurysmal. No evidence of aortic dissection. Heart is mildly enlarged. No pericardial effusion. Central airways are clear. Small bilateral pleural effusions. Bilateral interlobular septal thickening with patchy groundglass opacity and consolidation. No pneumothorax. No enlarged axillary or supraclavicular lymph nodes. Mild mediastinal adenopathy is likely reactive. There is a RIGHT posterior thyroid nodule. Diffuse body wall soft tissue edema. Small amount of soft tissue gas in the medial RIGHT breast soft tissues, related to recent procedure. No acute finding in the partially imaged upper abdomen. LEFT subclavian vein stent. 1.  No evidence of pulmonary embolus. 2.  Bilateral interlobular septal thickening, groundglass opacity, and consolidation. Findings are favored to represent moderate-severe pulmonary edema.  3.  Small

## 2021-05-30 NOTE — PROGRESS NOTES
Katy Contreras transferred to 317 from [facility] via wheelchair. Reason for transfer: lower level of care   Explained reason for transfer to Patient. Belongings: phone, purse, cane, shoes and clothing with patient at bedside . Soft chart transferred with patient: Yes. Telemetry box number N/A transferred with patient: yes. Report given to: Scott Boyce via telephone.       Electronically signed by Finn Gonsalez RN on 5/30/2021 at 10:37 AM

## 2021-05-30 NOTE — DISCHARGE SUMMARY
Discharge Summary  Date:5/30/2021        Patient Mohsen Ahumada     YOB: 1970     Age:51 y.o. Admit Date:5/29/2021   Admission Condition:poor   Discharged Condition:fair  Discharge Date: 05/30/21     Discharge Diagnoses   Active Problems:    Gastroesophageal reflux disease without esophagitis    Acquired hypothyroidism    Anemia in chronic kidney disease (CKD)    Type 2 diabetes mellitus with chronic kidney disease on chronic dialysis, with long-term current use of insulin ([address]    Noncompliance of patient with renal dialysis ([address]    Uncontrolled type 2 diabetes mellitus with complication, with long-term current use of insulin ([address]    Acute hypoxemic respiratory failure ([address]  Resolved Problems:    * No resolved hospital problems. [facility] Stay   Narrative of Hospital Course: Patient was admitted 1129, 58-year-old  female with a past medical history of ESRD on maintenance dialysis, noncompliance with several home medications, uncontrolled type 2 diabetes, patient presented to the emergency room with severe shortness of breath, work-up in the emergency room revealed moderate to severe pulmonary edema per CT imaging. Nephrology services were consulted for possible emergent dialysis needs. Patient was placed on BiPAP, as well as nasal cannula oxygen. Improvement seen in respiratory status. CTA chest was obtained which ruled out pulmonary embolus, there were findings of bilateral interlobar septal thickening, groundglass opacities and consolidation. Small pleural effusions, diffuse body wall soft tissue edema. Patient was stepdown from intensive care unit to medical floor. Blood sugar improved, patient able to tolerate a meal.  Seen by nephrology attending, patient set up for outpatient dialysis tomorrow. Home O2 evaluation was completed prior to patient's discharge for today since discharge which patient did not qualify for home O2.   Patient to follow-up with PCP in 1 week for hospital discharge was ongoing coordination of care. Educational modalities have been attached after visit summary. Consultants:   IP CONSULT TO NEPHROLOGY    Time Spent on Discharge:  45 minutes were spent in patient examination, evaluation, counseling as well as medication reconciliation, prescriptions for required medications, discharge plan and follow up.       Surgeries/Procedures Performed:       Treatments:   analgesia: acetaminophen and Norco, cardiac meds: Lipitor, clonidine, hydralazine, hydrochlorothiazide, Imdur, labetalol, lisinopril, Toprol-XL, Procardia, anticoagulation: heparin, insulin: Humalog and Lantus, respiratory therapy: O2 and electrolyte monitoring and stabilization    Significant Diagnostic Studies:   Recent Labs:  CBC:   Lab Results   Component Value Date    WBC 6.7 05/30/2021    RBC 3.23 05/30/2021    HGB 9.9 05/30/2021    HCT 31.8 05/30/2021    MCV 93.8 05/30/2021    MCH 28.5 05/30/2021    MCHC 30.4 05/30/2021    RDW 17.2 05/30/2021     05/30/2021     BMP:    Lab Results   Component Value Date    GLUCOSE 37 05/30/2021     05/30/2021    K 3.8 05/30/2021    CL 96 05/30/2021    CO2 27 05/30/2021    ANIONGAP 12 05/30/2021    BUN 44 05/30/2021    CREATININE 3.7 05/30/2021    CALCIUM 8.3 05/30/2021    LABGLOM 13 05/30/2021    GFRAA 16 05/30/2021     HFP:    Lab Results   Component Value Date    PROT 7.4 05/29/2021     CMP:    Lab Results   Component Value Date    GLUCOSE 37 05/30/2021     05/30/2021    K 3.8 05/30/2021    CL 96 05/30/2021    CO2 27 05/30/2021    BUN 44 05/30/2021    CREATININE 3.7 05/30/2021    ANIONGAP 12 05/30/2021    ALKPHOS 179 05/29/2021    ALT 68 05/29/2021    AST 90 05/29/2021    BILITOT 0.5 05/29/2021    LABALBU 3.8 05/29/2021    LABGLOM 13 05/30/2021    GFRAA 16 05/30/2021    PROT 7.4 05/29/2021    CALCIUM 8.3 05/30/2021     PT/INR:    Lab Results   Component Value Date    PROTIME 13.7 05/29/2021    INR 1.06 05/29/2021     PTT:   Lab Results   Component Value Date    APTT 26.0 05/25/2021     FLP:    Lab Results   Component Value Date    CHOL 159 03/06/2018    TRIG 39 03/06/2018    HDL 92 03/06/2018     U/A:    Lab Results   Component Value Date    COLORU YELLOW 10/21/2020    SPECGRAV 1.020 10/21/2020    PHUR 6.0 10/21/2020    PROTEINU 100 10/21/2020    GLUCOSEU =>1000 10/21/2020    KETUA TRACE 10/21/2020    BILIRUBINUR Negative 10/21/2020    UROBILINOGEN 0.2 10/21/2020    NITRU Negative 10/21/2020    LEUKOCYTESUR Negative 10/21/2020     TSH:    Lab Results   Component Value Date    TSH 17.110 10/08/2019       Radiology Last 7 Days:  XR CHEST PORTABLE    Result Date: 5/29/2021  Bilateral interstitial and airspace opacity. Favor moderate pulmonary edema but correlate with history and labs as infectious process is also within the differential. Signed by Dr Oneta Hammans on 5/29/2021 3:23 PM    CTA PULMONARY W CONTRAST    Result Date: 5/29/2021  1. No evidence of pulmonary embolus. 2.  Bilateral interlobular septal thickening, groundglass opacity, and consolidation. Findings are favored to represent moderate-severe pulmonary edema. 3.  Small pleural effusions. 4.  Diffuse body wall soft tissue edema. Signed by Dr Oneta Hammans on 5/29/2021 4:15 PM    Physical Exam  Vitals reviewed. Constitutional:       Comments: Fatigued appearing   HENT:      Head: Normocephalic and atraumatic. Nose: Nose normal.   Eyes:      General:         Right eye: No discharge. Left eye: No discharge. Conjunctiva/sclera: Conjunctivae normal.   Cardiovascular:      Rate and Rhythm: Normal rate and regular rhythm. Pulses: Normal pulses. Pulmonary:      Comments: Nasal cannula oxygen in place  Abdominal:      Tenderness: There is no abdominal tenderness. There is no guarding or rebound. Musculoskeletal:      Cervical back: Normal range of motion. Right lower leg: No edema. Left lower leg: No edema. Skin:     General: Skin is warm. extended release tablet  Commonly known as: TOPROL XL  Take 1 tablet by mouth every morning (before breakfast)     NIFEdipine 60 MG extended release tablet  Commonly known as: ADALAT CC     NovoLOG FlexPen 100 UNIT/ML injection pen  Generic drug: insulin aspart  Inject 10 Units into the skin 3 times daily Indications: Diabetes     nystatin 605256 UNIT/GM cream  Commonly known as: MYCOSTATIN  Apply topically 2 times daily. nystatin 500445 UNIT/ML suspension  Commonly known as: MYCOSTATIN  Take 5 mLs by mouth 4 times daily     rOPINIRole 2 MG tablet  Commonly known as: REQUIP     Senna-Lax 8.6 MG tablet  Generic drug: senna     sevelamer 800 MG tablet  Commonly known as: RENVELA     simvastatin 40 MG tablet  Commonly known as: ZOCOR     Vitamin D2 10 MCG (400 UNIT) Tabs            EMR Dragon/Transcription disclaimer:   Much of this encounter note is an electronic transcription/translation of spoken language to printed text.  The electronic translation of spoken language may permit erroneous, or at times, nonsensical words or phrases to be inadvertently transcribed; although attempts have made to review the note for such errors, some may still exist.    Electronically signed by   Marilyn Benson MD   Internal Medicine Hospitalist  On 5/30/2021  At 5:44 PM

## 2021-05-30 NOTE — PROGRESS NOTES
Pt stating that she is feeling better. Is still eating her crackers and drinking her milk. Will recheck fsb at 0400. Pt had had 2 sandwiches earlier in the night after receiving her insulin doses.  Electronically signed by Giselle Pickard RN on 5/30/2021 at 3:52 AM

## 2021-05-30 NOTE — PROGRESS NOTES
fsb now 90. No other needs voiced.  Electronically signed by Leanora Brunner, RN on 5/30/2021 at 5:04 AM

## 2021-06-01 LAB
EKG P AXIS: 70 DEGREES
EKG P AXIS: 75 DEGREES
EKG P-R INTERVAL: 162 MS
EKG P-R INTERVAL: 168 MS
EKG Q-T INTERVAL: 352 MS
EKG Q-T INTERVAL: 366 MS
EKG QRS DURATION: 88 MS
EKG QRS DURATION: 92 MS
EKG QTC CALCULATION (BAZETT): 411 MS
EKG QTC CALCULATION (BAZETT): 427 MS
EKG T AXIS: 100 DEGREES
EKG T AXIS: 97 DEGREES

## 2021-06-01 PROCEDURE — 93010 ELECTROCARDIOGRAM REPORT: CPT | Performed by: INTERNAL MEDICINE

## 2021-06-03 LAB
BLOOD CULTURE, ROUTINE: NORMAL
CULTURE, BLOOD 2: NORMAL

## 2021-06-15 ENCOUNTER — APPOINTMENT (OUTPATIENT)
Dept: GENERAL RADIOLOGY | Age: 51
End: 2021-06-15
Payer: MEDICARE

## 2021-06-15 ENCOUNTER — HOSPITAL ENCOUNTER (EMERGENCY)
Age: 51
Discharge: HOME | End: 2021-06-16
Attending: EMERGENCY MEDICINE
Payer: MEDICARE
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
## 2021-06-15 DIAGNOSIS — S70.01XA CONTUSION OF RIGHT HIP, INITIAL ENCOUNTER: ICD-10-CM

## 2021-06-15 PROCEDURE — 6360000002 HC RX W HCPCS: Performed by: EMERGENCY MEDICINE

## 2021-06-15 PROCEDURE — 73502 X-RAY EXAM HIP UNI 2-3 VIEWS: CPT

## 2021-06-15 PROCEDURE — 99285 EMERGENCY DEPT VISIT HI MDM: CPT

## 2021-06-15 PROCEDURE — 96374 THER/PROPH/DIAG INJ IV PUSH: CPT

## 2021-06-15 RX ORDER — MORPHINE SULFATE 4 MG/ML
4 INJECTION, SOLUTION INTRAMUSCULAR; INTRAVENOUS ONCE
Status: COMPLETED | OUTPATIENT
Start: 2021-06-15 | End: 2021-06-15

## 2021-06-15 RX ORDER — ONDANSETRON 2 MG/ML
4 INJECTION INTRAMUSCULAR; INTRAVENOUS ONCE
Status: COMPLETED | OUTPATIENT
Start: 2021-06-15 | End: 2021-06-15

## 2021-06-15 RX ADMIN — MORPHINE SULFATE 4 MG: 4 INJECTION, SOLUTION INTRAMUSCULAR; INTRAVENOUS at 23:03

## 2021-06-15 RX ADMIN — ONDANSETRON 4 MG: 2 INJECTION INTRAMUSCULAR; INTRAVENOUS at 23:03

## 2021-06-15 ASSESSMENT — PAIN SCALES - GENERAL
PAINLEVEL_OUTOF10: 10
PAINLEVEL_OUTOF10: 10

## 2021-06-15 ASSESSMENT — PAIN DESCRIPTION - LOCATION: LOCATION: PELVIS

## 2021-06-15 ASSESSMENT — PAIN DESCRIPTION - ORIENTATION: ORIENTATION: LEFT

## 2021-06-16 ENCOUNTER — APPOINTMENT (OUTPATIENT)
Dept: CT IMAGING | Age: 51
End: 2021-06-16
Payer: MEDICARE

## 2021-06-16 VITALS
HEIGHT: 66 IN | WEIGHT: 155 LBS | RESPIRATION RATE: 20 BRPM | SYSTOLIC BLOOD PRESSURE: 186 MMHG | HEART RATE: 72 BPM | DIASTOLIC BLOOD PRESSURE: 70 MMHG | OXYGEN SATURATION: 93 % | TEMPERATURE: 98.6 F | BODY MASS INDEX: 24.91 KG/M2

## 2021-06-16 PROCEDURE — 72192 CT PELVIS W/O DYE: CPT

## 2021-06-16 PROCEDURE — 6360000002 HC RX W HCPCS: Performed by: EMERGENCY MEDICINE

## 2021-06-16 RX ORDER — MORPHINE SULFATE 4 MG/ML
4 INJECTION, SOLUTION INTRAMUSCULAR; INTRAVENOUS ONCE
Status: COMPLETED | OUTPATIENT
Start: 2021-06-16 | End: 2021-06-16

## 2021-06-16 RX ADMIN — MORPHINE SULFATE 4 MG: 4 INJECTION, SOLUTION INTRAMUSCULAR; INTRAVENOUS at 01:51

## 2021-06-16 ASSESSMENT — PAIN SCALES - GENERAL: PAINLEVEL_OUTOF10: 8

## 2021-06-16 NOTE — ED PROVIDER NOTES
disease)     Hemodialysis patient ([facility] [address]     dialysis on tues, thur, and sat at [address] Hypertension     Hypothyroid     Nasal congestion     recent    Neuropathy     Noncompliance with medications     Palliative care patient 10/08/2019    Restless leg syndrome          SURGICAL HISTORY       Past Surgical History:   Procedure Laterality Date    BREAST SURGERY Left 2008    infected milk gland removed    BREAST SURGERY Right 5/25/2021    WEDGE EXCISION RIGHT BREAST DUCT WITH LACRIMAL DUCT PROBE, PEC BLOCK performed by Bia Allen MD at [address], [facility]      2008    COLONOSCOPY Left 9/17/2020    Dr Altagracia Bales hepatic flexure-Severe tortuosity with severe spasming, 1 yr recall    DIALYSIS FISTULA CREATION Left 1/25/2019    REVISION LEFT UPPER EXTREMITY AV ACCESS WITH LEFT BRACHIAL ARTERY TO LEFT AXILLARY VEIN WITH  INTERPOSITIONAL ARTEGRAFT   performed by Ar Hubbard MD at [address]  2012    [address] Right 1/25/2019    INSERTION OF RIGHT INTERNAL JUGULAR HEMODIALYSIS CATHETER performed by Ar Hubbard MD at [address] [facility]  08/17/2018    1.  Moderately increased stainable iron identified by special stain in Kupffer cells and occasional hepatocytes. Negative for evidence of significant portal or lobular inflammation. Negative for evidence of significant fibrosis    [facility] COLONOSCOPY W/BIOPSY SINGLE/MULTIPLE N/A 10/20/2017    Dr Chrissy Stapleton prep-Tubular AP (-) dysplasia x 2--3 yr recall    [facility] EGD TRANSORAL BIOPSY SINGLE/MULTIPLE N/A 12/27/2016    Dr Carin Chan EGD TRANSORAL BIOPSY SINGLE/MULTIPLE N/A 10/20/2017    Dr Best Mg (-)   [facility] VASCULAR SURGERY Left 2016    fistula by Dr Sandeep Lombardo at [address]  10/02/2017    [facility]. Left upper fistulograms/venograms, balloon angioplasty cephalic vein arch 31W54 conquest.    VASCULAR SURGERY  08/2018    FISTULOGRAM    VASCULAR mg by mouth every 14 daysHistorical Med      senna (SENNA-LAX) 8.6 MG tablet Take 1 tablet by mouth dailyHistorical Med      sevelamer (RENVELA) 800 MG tablet Take 1 tablet by mouth 3 times daily (with meals)Historical Med      lisinopril (PRINIVIL;ZESTRIL) 20 MG tablet Take 1 tablet by mouth 2 times daily, Disp-30 tablet, R-0Normal      metoprolol succinate (TOPROL XL) 100 MG extended release tablet Take 1 tablet by mouth every morning (before breakfast), Disp-30 tablet, R-0Normal      levothyroxine (SYNTHROID) 150 MCG tablet Take 150 mcg by mouth DailyHistorical Med      NIFEdipine (ADALAT CC) 60 MG extended release tablet Take 60 mg by mouth dailyHistorical Med      isosorbide mononitrate (IMDUR) 120 MG extended release tablet Take 1 tablet by mouth daily, Disp-30 tablet, R-0Normal      DULoxetine (CYMBALTA) 60 MG extended release capsule Take 60 mg by mouth dailyHistorical Med      rOPINIRole (REQUIP) 2 MG tablet Take 4 mg by mouth nightly Indications: Restless Leg Syndrome Historical Med      simvastatin (ZOCOR) 40 MG tablet Take 40 mg by mouth nightly Historical Med      cloNIDine (CATAPRES) 0.3 MG tablet Take 1 tablet by mouth every 8 hours, Disp-60 tablet, R-3Normal      albuterol sulfate  (90 Base) MCG/ACT inhaler Inhale 2 puffs into the lungs every 4 hours as neededHistorical Med             ALLERGIES     Penicillins, Lamisil advanced [terbinafine], Stadol [butorphanol], and Reglan [metoclopramide]    FAMILY HISTORY       Family History   Problem Relation Age of Onset    Colon Cancer Mother          at 61    Colon Polyps Mother     Lung Cancer Father          at 66    Colon Polyps Father     Stomach Cancer Sister     Breast Cancer Sister 27    Depression Daughter 25        committed suicide    Liver Cancer Neg Hx     Liver Disease Neg Hx     Esophageal Cancer Neg Hx     Rectal Cancer Neg Hx           SOCIAL HISTORY       Social History     Socioeconomic History    Marital status: Single     Spouse name: None    Number of children: 2    Years of education: None    Highest education level: None   Occupational History    None   Tobacco Use    Smoking status: Current Every Day Smoker     Packs/day: 0.50     Years: 33.00     Pack years: 16.50     Types: Cigarettes    Smokeless tobacco: Never Used    Tobacco comment: NOW at 1/4 PD, started at age 24 and has averaged 2 PPD   Vaping Use    Vaping Use: Never used   Substance and Sexual Activity    Alcohol use: No    Drug use: Not Currently     Types: Marijuana    Sexual activity: Not Currently     Partners: Male   Other Topics Concern    None   Social History Narrative    None     Social Determinants of Health     Financial Resource Strain:     Difficulty of Paying Living Expenses:    Food Insecurity:     Worried About Running Out of Food in the Last Year:     Ran Out of Food in the Last Year:    Transportation Needs:     Lack of Transportation (Medical):      Lack of Transportation (Non-Medical):    Physical Activity:     Days of Exercise per Week:     Minutes of Exercise per Session:    Stress:     Feeling of Stress :    Social Connections:     Frequency of Communication with Friends and Family:     Frequency of Social Gatherings with Friends and Family:     Attends Yarsanism Services:     Active Member of Clubs or Organizations:     Attends Club or Organization Meetings:     Marital Status:    Intimate Partner Violence:     Fear of Current or Ex-Partner:     Emotionally Abused:     Physically Abused:     Sexually Abused:        SCREENINGS    Yousuf Coma Scale  Eye Opening: Spontaneous  Best Verbal Response: Oriented  Best Motor Response: Obeys commands  Yousuf Coma Scale Score: 15        PHYSICAL EXAM    (up to 7 for level 4, 8 or more for level 5)     ED Triage Vitals   BP Temp Temp Source Pulse Resp SpO2 Height Weight   06/15/21 2040 06/15/21 2037 06/15/21 2037 06/15/21 2041 06/15/21 2041 06/15/21 2040 06/15/21 2041 06/15/21 2041   (!) 213/78 98.6 °F (37 °C) Oral 84 18 94 % 5' 6\" (1.676 m) 155 lb (70.3 kg)       Physical Exam  Vitals and nursing note reviewed. HENT:      Head: Atraumatic. Mouth/Throat:      Mouth: Mucous membranes are not dry. Pharynx: No posterior oropharyngeal erythema. Eyes:      General: No scleral icterus. Pupils: Pupils are equal, round, and reactive to light. Neck:      Trachea: No tracheal deviation. Cardiovascular:      Rate and Rhythm: Normal rate and regular rhythm. Heart sounds: Normal heart sounds. No murmur heard. Pulmonary:      Effort: Pulmonary effort is normal. No respiratory distress. Breath sounds: Normal breath sounds. No stridor. Abdominal:      General: There is no distension. Palpations: Abdomen is soft. Tenderness: There is no abdominal tenderness. There is no guarding. Musculoskeletal:      Right shoulder: No tenderness. Left shoulder: No tenderness. Thoracic back: No tenderness. Lumbar back: No tenderness. Right hip: No tenderness. Normal range of motion. Left hip: Tenderness present. Normal range of motion. Right knee: No tenderness. Left knee: No tenderness. Skin:     Coloration: Skin is not pale. Findings: No rash. Neurological:      Mental Status: She is alert and oriented to person, place, and time. Psychiatric:         Behavior: Behavior is cooperative. DIAGNOSTIC RESULTS       RADIOLOGY:  Non-plain film images such as CT, Ultrasound and MRI are read by the radiologist. Plain radiographic images are visualized and preliminarily interpreted bythe emergency physician with the below findings:          CT PELVIS WO CONTRAST Additional Contrast? None   Final Result   1. No evidence of acute pelvic or hip fracture. The hip joint spaces   are symmetric and well maintained. 2. Degenerative changes of the lower lumbar spine. 3. Body wall edema.    4. Other findings, as discussed above. Signed by Dr Roberto Morris      XR HIP 2-3 VW W PELVIS RIGHT   Final Result   1. No acute bony abnormality. Signed by Dr Mary Porter        CT PELVIS:  Comparison: CT abdomen and pelvis from 4-29-21      Osteopenia. Moderate degenerative changes. No fracture or dislocation. No significant hematoma. Moderate anasarca. Appendix is mildly dilated to 7 mm in maximal diameter, filled with inspissated stool, without surrounding mesenteric inflammation, unchanged, likely patient's previous. Moderate amount of atherosclerotic calcifications. LABS:  Labs Reviewed - No data to display    All other labs were within normal range or not returned as of this dictation. EMERGENCY DEPARTMENT COURSE and DIFFERENTIAL DIAGNOSIS/MDM:   Vitals:    Vitals:    06/16/21 0057 06/16/21 0235 06/16/21 0317 06/16/21 0605   BP: (!) 209/80 (!) 200/73 (!) 186/82 (!) 186/70   Pulse: 77 72 75 72   Resp: 18 20 20    Temp:       TempSrc:       SpO2: 96% 93% 96% 93%   Weight:       Height:           MDM    Reassessment    Patient's plain film radiograph does not demonstrate evidence of fracture or obvious abnormality. When attempting to ambulate she continues to complain of significant pain is having trouble putting weight for more than 2-3 steps. We will plan to obtain a CT scan to exclude occult fracture. CT scan is without evidence of fracture. We will plan to discharge her home once her ride is available in the a.m. PROCEDURES:  Unless otherwise noted below, none     Procedures    FINAL IMPRESSION      1. Fall, initial encounter    2.  Contusion of right hip, initial encounter          DISPOSITION/PLAN   DISPOSITION Decision To Discharge 06/16/2021 04:27:24 AM      PATIENT REFERRED TO:  ROMINA Bryan. [address]  [phone]            DISCHARGE MEDICATIONS:  Discharge Medication List as of 6/16/2021  7:20 AM             (Please note that portions of this note were completed with a voice recognition program.  Efforts were made to edit thedictations but occasionally words are mis-transcribed.)    Lyric Hinson MD (electronically signed)  Attending Emergency Physician         Lyric Hinson MD  06/22/21 [id number] no

## 2021-06-16 NOTE — ED NOTES
Patient states that she got ahold of her ride. Pt states that her ride does not get off until 8am. Pt states that they will be here to get her after that.      Fouzia Mooney RN  06/16/21 [id number]

## 2021-06-16 NOTE — ED NOTES
Assisted pt with getting out of bed to ambulate in the hallway. Pt states that she walks with a cane. Pt able to stand. Pt only able to take 3 steps. Pt sobbing in pain while walking. Dr. Rios Degree notified.       Myrna Jauregui RN  06/16/21 1075

## 2021-06-19 ENCOUNTER — HOSPITAL ENCOUNTER (EMERGENCY)
Age: 51
Discharge: HOME | End: 2021-06-19
Attending: EMERGENCY MEDICINE
Payer: MEDICARE

## 2021-06-19 ENCOUNTER — APPOINTMENT (OUTPATIENT)
Dept: CT IMAGING | Age: 51
End: 2021-06-19
Payer: MEDICARE

## 2021-06-19 ENCOUNTER — APPOINTMENT (OUTPATIENT)
Dept: GENERAL RADIOLOGY | Age: 51
End: 2021-06-19
Payer: MEDICARE

## 2021-06-19 VITALS
WEIGHT: 150 LBS | RESPIRATION RATE: 20 BRPM | TEMPERATURE: 98 F | SYSTOLIC BLOOD PRESSURE: 188 MMHG | HEIGHT: 66 IN | DIASTOLIC BLOOD PRESSURE: 68 MMHG | HEART RATE: 80 BPM | BODY MASS INDEX: 24.11 KG/M2 | OXYGEN SATURATION: 93 %

## 2021-06-19 DIAGNOSIS — R03.0 ELEVATED BLOOD PRESSURE READING: ICD-10-CM

## 2021-06-19 DIAGNOSIS — M25.552 PELVIC JOINT PAIN, LEFT: ICD-10-CM

## 2021-06-19 DIAGNOSIS — M79.652 LEFT THIGH PAIN: ICD-10-CM

## 2021-06-19 DIAGNOSIS — N18.9 CHRONIC RENAL FAILURE, UNSPECIFIED CKD STAGE: Primary | ICD-10-CM

## 2021-06-19 DIAGNOSIS — R73.9 HYPERGLYCEMIA: ICD-10-CM

## 2021-06-19 DIAGNOSIS — R10.32 ABDOMINAL PAIN, LEFT LOWER QUADRANT: ICD-10-CM

## 2021-06-19 DIAGNOSIS — M25.552 LEFT HIP PAIN: ICD-10-CM

## 2021-06-19 PROBLEM — E11.9 DM2 (DIABETES MELLITUS, TYPE 2) (HCC): Status: ACTIVE | Noted: 2021-01-01

## 2021-06-19 PROBLEM — I16.0 HYPERTENSIVE URGENCY: Status: ACTIVE | Noted: 2021-01-01

## 2021-06-19 PROBLEM — M84.48XA SACRAL INSUFFICIENCY FRACTURE: Status: ACTIVE | Noted: 2021-01-01

## 2021-06-19 LAB
ANION GAP SERPL CALCULATED.3IONS-SCNC: 14 MMOL/L (ref 7–19)
BUN BLDV-MCNC: 41 MG/DL (ref 6–20)
CALCIUM SERPL-MCNC: 8 MG/DL (ref 8.6–10)
CHLORIDE BLD-SCNC: 89 MMOL/L (ref 98–111)
CO2: 26 MMOL/L (ref 22–29)
CREAT SERPL-MCNC: 4.9 MG/DL (ref 0.5–0.9)
GFR AFRICAN AMERICAN: 11
GFR NON-AFRICAN AMERICAN: 9
GLUCOSE BLD-MCNC: 359 MG/DL (ref 74–109)
HCT VFR BLD CALC: 31.2 % (ref 37–47)
HEMOGLOBIN: 10.2 G/DL (ref 12–16)
MAGNESIUM: 2.3 MG/DL (ref 1.6–2.6)
MCH RBC QN AUTO: 29.3 PG (ref 27–31)
MCHC RBC AUTO-ENTMCNC: 32.7 G/DL (ref 33–37)
MCV RBC AUTO: 89.7 FL (ref 81–99)
PDW BLD-RTO: 15.8 % (ref 11.5–14.5)
PLATELET # BLD: 151 K/UL (ref 130–400)
PMV BLD AUTO: 9.6 FL (ref 9.4–12.3)
POTASSIUM REFLEX MAGNESIUM: 3.3 MMOL/L (ref 3.5–5)
RBC # BLD: 3.48 M/UL (ref 4.2–5.4)
SODIUM BLD-SCNC: 129 MMOL/L (ref 136–145)
WBC # BLD: 6.3 K/UL (ref 4.8–10.8)

## 2021-06-19 PROCEDURE — 85027 COMPLETE CBC AUTOMATED: CPT

## 2021-06-19 PROCEDURE — 72131 CT LUMBAR SPINE W/O DYE: CPT

## 2021-06-19 PROCEDURE — 6360000004 HC RX CONTRAST MEDICATION: Performed by: EMERGENCY MEDICINE

## 2021-06-19 PROCEDURE — 74177 CT ABD & PELVIS W/CONTRAST: CPT

## 2021-06-19 PROCEDURE — 80048 BASIC METABOLIC PNL TOTAL CA: CPT

## 2021-06-19 PROCEDURE — 36415 COLL VENOUS BLD VENIPUNCTURE: CPT

## 2021-06-19 PROCEDURE — 83735 ASSAY OF MAGNESIUM: CPT

## 2021-06-19 PROCEDURE — 73552 X-RAY EXAM OF FEMUR 2/>: CPT

## 2021-06-19 PROCEDURE — 99284 EMERGENCY DEPT VISIT MOD MDM: CPT

## 2021-06-19 RX ADMIN — IOPAMIDOL 90 ML: 755 INJECTION, SOLUTION INTRAVENOUS at 05:35

## 2021-06-19 ASSESSMENT — PAIN SCALES - GENERAL: PAINLEVEL_OUTOF10: 10

## 2021-06-19 ASSESSMENT — PAIN DESCRIPTION - LOCATION: LOCATION: BUTTOCKS

## 2021-06-19 NOTE — ED NOTES
Pt moaning with pain when dressing pt.   Pt able to stand with walker     Julito Collazo RN  06/19/21 [id number]

## 2021-06-19 NOTE — ED PROVIDER NOTES
[facility]  eMERGENCY dEPARTMENT eNCOUnter      Pt Name: Todd Ly  MRN: [id number]  [place] 1970  Date of evaluation: 6/19/2021  Provider: Brionna Viramontes MD    [address] [place] [address]       Chief Complaint   Patient presents with    Tailbone Pain     fell tuesday \"It feels like my insides are coming out my fay-ha. I hurt so bad\"    Hip Pain     left         HISTORY OF PRESENT ILLNESS   (Location/Symptom, Timing/Onset,Context/Setting, Quality, Duration, Modifying Factors, Severity)  Note limiting factors. Todd Ly is a 45 y.o. female who presents to the emergency department due to left sided hip/pelvic/abdominal pain. Tells me she had a mechanical fall 2 days ago. Tells me she slipped and fell and landed on her left hip. Was seen here and had negative imaging and discharged home. Said that she has had trouble getting around home since because of the pain. Pain is in the left lower abdomen hip and left upper thigh. Has numbness in her feet chronically due to neuropathy. No new numbness or weakness. Does not make any urine. No bowel incontinence or retention. On exam she has tenderness in the left lower abdomen and pelvis and hip. Also has some mild tenderness in the left thigh. Denies any other injuries. No headache neck or back pain. No pain in the chest.  Pain in the abdomen localized to the left lower quadrant and seems to be much more localized to the hip. Other than pain in the left hip/thigh has no pain or injury in the extremities. Other than her chronic numbness from neuropathy no new numbness or weakness. HPI    NursingNotes were reviewed. REVIEW OF SYSTEMS    (2-9 systems for level 4, 10 or more for level 5)     Review of Systems   Constitutional: Negative for fever. Eyes: Negative for pain. Respiratory: Negative for shortness of breath. Cardiovascular: Negative for chest pain and palpitations.    Gastrointestinal: Negative for abdominal pain, diarrhea and vomiting. Genitourinary: Negative for dysuria and flank pain. Musculoskeletal: Negative for back pain and neck pain. Skin: Negative for rash. Neurological: Positive for numbness (chronic in feet, baseline). Negative for weakness and headaches. All other systems reviewed and are negative. A complete review of systems was performed and is negative except as noted above in the HPI.        PAST MEDICAL HISTORY     Past Medical History:   Diagnosis Date    Arthritis     Chronic kidney disease, stage 5, kidney failure ([facility] [address]     Closed fracture of right shoulder girdle, with routine healing, subsequent encounter     DM (diabetes mellitus) type II controlled with renal manifestation ([facility] [address] 06/1993    Gastroparesis     GERD (gastroesophageal reflux disease)     Hemodialysis patient ([facility] [address]     dialysis on tues, thur, and sat at [facility]    Hypertension     Hypothyroid     Nasal congestion     recent    Neuropathy     Noncompliance with medications     Palliative care patient 10/08/2019    Restless leg syndrome          SURGICAL HISTORY       Past Surgical History:   Procedure Laterality Date    BREAST SURGERY Left 2008    infected milk gland removed    BREAST SURGERY Right 5/25/2021    WEDGE EXCISION RIGHT BREAST DUCT WITH LACRIMAL DUCT PROBE, PEC BLOCK performed by Nehal Miller MD at [facility]      2008    COLONOSCOPY Left 9/17/2020    Dr Mami Hartman hepatic flexure-Severe tortuosity with severe spasming, 1 yr recall    DIALYSIS FISTULA CREATION Left 1/25/2019    REVISION LEFT UPPER EXTREMITY AV ACCESS WITH LEFT BRACHIAL ARTERY TO LEFT AXILLARY VEIN WITH  INTERPOSITIONAL ARTEGRAFT   performed by Dylon Solis MD at [address]  2012    [address] Right 1/25/2019    INSERTION OF RIGHT INTERNAL JUGULAR HEMODIALYSIS CATHETER performed by Dylon Solis MD at [facility]  08/17/2018    1.  Moderately increased stainable iron identified by special stain in Kupffer cells and occasional hepatocytes. Negative for evidence of significant portal or lobular inflammation. Negative for evidence of significant fibrosis    [place] COLONOSCOPY W/BIOPSY SINGLE/MULTIPLE N/A 10/20/2017    Dr Hilario Hull prep-Tubular AP (-) dysplasia x 2--3 yr recall    [place] EGD TRANSORAL BIOPSY SINGLE/MULTIPLE N/A 12/27/2016    Dr Anton Covarrubias EGD TRANSORAL BIOPSY SINGLE/MULTIPLE N/A 10/20/2017    Dr Cynthia Cordova (-)   [address] [facility] VASCULAR SURGERY Left 2016    fistula by Dr Son [place] at [address]  10/02/2017    [facility]. Left upper fistulograms/venograms, balloon angioplasty cephalic vein arch 07A84 conquest.    VASCULAR SURGERY  08/2018    FISTULOGRAM    VASCULAR SURGERY  10/29/2018    [facility]. Left upper fistulograms/venograms    VASCULAR SURGERY  12/19/2018    [facility]. Arch aortogram,left upper arteriograms.  VASCULAR SURGERY  03/06/2019    [facility]. Removal of tunneled dialyis catheter right internal jugular vein.  VASCULAR SURGERY  08/28/2019    [facility]. Left upper extremity fistulogram including venography to the superior vena cava. Balloon angioplasty left subclavian vein with 10mm x 40mm conquest balloon. Balloon angioplasty mid/proximal upper arm cephalic vein with 73PN x 40mm conquest balloon. Venograms after balloon angioplasty. Stent left mid/proximal upper arm cephalic vein stenosis fluency 10mm x 60mm self-expanding covered stent. Balloon     VASCULAR SURGERY  08/28/2019    cont angioplasty stent with 10mm x 40mm conquest balloon. Completion venograms left upper extremity.          CURRENT MEDICATIONS       Discharge Medication List as of 6/19/2021  8:03 AM      CONTINUE these medications which have NOT CHANGED    Details   insulin glargine (BASAGLAR KWIKPEN) 100 UNIT/ML injection pen Inject 40 Units into the skin nightly Indications: DiabetesHistorical Med      insulin aspart (NOVOLOG FLEXPEN) 100 UNIT/ML injection pen Inject 10 Units into the skin 3 times daily Indications: Diabetes, Disp-5 pen, R-3Normal      hydrALAZINE (APRESOLINE) 50 MG tablet Take 1.5 tablets by mouth every 8 hours, Disp-90 tablet,R-0Normal      hydroCHLOROthiazide (HYDRODIURIL) 25 MG tablet Take 2 tablets by mouth daily, Disp-30 tablet,R-3Normal      Ergocalciferol (VITAMIN D2) 10 MCG (400 UNIT) TABS Take 1.25 mg by mouth every 14 daysHistorical Med      senna (SENNA-LAX) 8.6 MG tablet Take 1 tablet by mouth dailyHistorical Med      cloNIDine (CATAPRES) 0.3 MG tablet Take 1 tablet by mouth every 8 hours, Disp-60 tablet, R-3Normal      sevelamer (RENVELA) 800 MG tablet Take 1 tablet by mouth 3 times daily (with meals)Historical Med      lisinopril (PRINIVIL;ZESTRIL) 20 MG tablet Take 1 tablet by mouth 2 times daily, Disp-30 tablet, R-0Normal      metoprolol succinate (TOPROL XL) 100 MG extended release tablet Take 1 tablet by mouth every morning (before breakfast), Disp-30 tablet, R-0Normal      levothyroxine (SYNTHROID) 150 MCG tablet Take 150 mcg by mouth DailyHistorical Med      NIFEdipine (ADALAT CC) 60 MG extended release tablet Take 60 mg by mouth dailyHistorical Med      isosorbide mononitrate (IMDUR) 120 MG extended release tablet Take 1 tablet by mouth daily, Disp-30 tablet, R-0Normal      DULoxetine (CYMBALTA) 60 MG extended release capsule Take 60 mg by mouth dailyHistorical Med      rOPINIRole (REQUIP) 2 MG tablet Take 4 mg by mouth nightly Indications: Restless Leg Syndrome Historical Med      simvastatin (ZOCOR) 40 MG tablet Take 40 mg by mouth nightly Historical Med      HYDROcodone-acetaminophen (NORCO) 5-325 MG per tablet Take 1 tablet by mouth every 6 hours as needed for Pain., Disp-10 tablet, R-0Normal      nystatin (MYCOSTATIN) 659457 UNIT/ML suspension Take 5 mLs by mouth 4 times daily, Oral, 4 TIMES DAILY Starting Sun 5/16/2021, Disp-1 Bottle, R-1, Normal      nystatin (MYCOSTATIN) 461614 UNIT/GM cream Apply topically 2 times daily. , Disp-15 g, R-0, Normal      albuterol sulfate  (90 Base) MCG/ACT inhaler Inhale 2 puffs into the lungs every 4 hours as neededHistorical Med             ALLERGIES     Penicillins, Lamisil advanced [terbinafine], Stadol [butorphanol], and Reglan [metoclopramide]    FAMILY HISTORY       Family History   Problem Relation Age of Onset    Colon Cancer Mother          at 61    Colon Polyps Mother     Lung Cancer Father          at 66    Colon Polyps Father     Stomach Cancer Sister     Breast Cancer Sister 27    Depression Daughter 25        committed suicide    Liver Cancer Neg Hx     Liver Disease Neg Hx     Esophageal Cancer Neg Hx     Rectal Cancer Neg Hx           SOCIAL HISTORY       Social History     Socioeconomic History    Marital status: Single     Spouse name: None    Number of children: 2    Years of education: None    Highest education level: None   Occupational History    None   Tobacco Use    Smoking status: Current Every Day Smoker     Packs/day: 0.50     Years: 33.00     Pack years: 16.50     Types: Cigarettes    Smokeless tobacco: Never Used    Tobacco comment: NOW at 1/4 PD, started at age 24 and has averaged 2 PPD   Vaping Use    Vaping Use: Never used   Substance and Sexual Activity    Alcohol use: No    Drug use: Not Currently     Types: Marijuana    Sexual activity: Not Currently     Partners: Male   Other Topics Concern    None   Social History Narrative    None     Social Determinants of Health     Financial Resource Strain:     Difficulty of Paying Living Expenses:    Food Insecurity:     Worried About Running Out of Food in the Last Year:     Ran Out of Food in the Last Year:    Transportation Needs:     Lack of Transportation (Medical):      Lack of Transportation (Non-Medical):    Physical Activity:     Days of Exercise per Week:     Minutes of Exercise per Session:    Stress:     Feeling of Stress :    Social Connections:     Frequency of Tenderness present. No deformity or crepitus. Normal range of motion. Normal strength. Left upper leg: Tenderness (mild in upper thigh) present. No deformity. Left knee: No deformity or bony tenderness. Normal range of motion. Left lower leg: No deformity or tenderness. Left ankle: No deformity. No tenderness. Normal range of motion. Left foot: Normal range of motion and normal capillary refill. No deformity, bony tenderness or crepitus. Normal pulse. Skin:     General: Skin is warm and dry. Neurological:      Mental Status: She is alert and oriented to person, place, and time. Psychiatric:         Behavior: Behavior normal.         DIAGNOSTIC RESULTS       RADIOLOGY:   Non-plain film images such as CT, Ultrasound and MRI are read by the radiologist. McLendon-Chisholm Borer images are visualized and preliminarily interpreted by the emergency physician with the below findings:    Left femur: No fracture or malalignment    Interpretation per the Radiologist below, if available at the time of this note:    CT ABDOMEN & PELVIS With Contrast:  Impression:Generalized bodywall anasarca. Tinybilateral pleural effusions and bibasilar groundglass  attenuation mayrepresent congestive failure. Findings:Bonystructures appear unremarkable. Solid abdominal organs appear unremarkable. No  free fluid or free air. Right kidneyappears atrophic. Atherosclerotic vascular calcification. GI tract  appears grosslyunremarkable. Radiologist: Skylar Ortiz M.D.     CT L SPINE:  Impression:  No acute fracture or malalignment.   Findings:None additional.  Radiologist: Skylar Ortiz M.D.         Yolis Gallegos:  Labs Reviewed   CBC - Abnormal; Notable for the following components:       Result Value    RBC 3.48 (*)     Hemoglobin 10.2 (*)     Hematocrit 31.2 (*)     MCHC 32.7 (*)     RDW 15.8 (*)     All other components within normal limits   BASIC METABOLIC PANEL W/ REFLEX TO MG FOR LOW K - Abnormal; Notable for the following components:    Sodium 129 (*)     Potassium reflex Magnesium 3.3 (*)     Chloride 89 (*)     Glucose 359 (*)     BUN 41 (*)     CREATININE 4.9 (*)     GFR Non- 9 (*)     GFR  11 (*)     Calcium 8.0 (*)     All other components within normal limits   MAGNESIUM       All other labs were within normal range or not returned as of this dictation. EMERGENCY DEPARTMENT COURSE and DIFFERENTIALDIAGNOSIS/MDM:   Vitals:    Vitals:    06/19/21 0444 06/19/21 0600 06/19/21 0634   BP: (!) 199/69 (!) 180/69 (!) 188/68   Pulse: 66 68 80   Resp: 20 20 20   Temp: 98 °F (36.7 °C)     SpO2: 94%  93%   Weight: 150 lb (68 kg)     Height: 5' 6\" (1.676 m)         MDM  Patient has some signs of congestive failure on imaging but she has no respiratory symptoms. This is probably related to her chronic renal failure. She is due for dialysis this morning. We have contacted dialysis and she will be going from the ER directly to dialysis for her dialysis. She is able to stand and bear weight with walker and has walker and wheelchair at home. No signs of any fractures on imaging. I do not see any indication for admission at this time. She will be discharged and sent to dialysis and instructed to follow-up with primary care for further evaluation and return to ER for change worsening symptoms or new concerns. CONSULTS:  None    PROCEDURES:  Unless otherwise notedbelow, none     Procedures    FINAL IMPRESSION     1. Chronic renal failure, unspecified CKD stage    2. Left hip pain    3. Pelvic joint pain, left    4. Elevated blood pressure reading    5. Left thigh pain    6. Hyperglycemia    7.  Abdominal pain, left lower quadrant          DISPOSITION/PLAN   DISPOSITION Decision To Discharge 06/19/2021 06:28:25 AM      PATIENT REFERRED TO:  @FUP@    DISCHARGE MEDICATIONS:  Discharge Medication List as of 6/19/2021  8:03 AM             (Please note that portions of this note were completed with a voice

## 2021-06-19 NOTE — ED NOTES
Bed: 07  Expected date: 6/19/21  Expected time:   Means of arrival: [facility]  Comments:  [address] fall 2 days ago     [place]  06/19/21 [id number]

## 2021-06-22 ASSESSMENT — ENCOUNTER SYMPTOMS
BACK PAIN: 0
BACK PAIN: 0
SHORTNESS OF BREATH: 0
ABDOMINAL PAIN: 0
EYE PAIN: 0
NAUSEA: 0
VOMITING: 0
SHORTNESS OF BREATH: 0
ABDOMINAL PAIN: 0
DIARRHEA: 0
VOMITING: 0
DIARRHEA: 0

## 2021-08-16 NOTE — PROGRESS NOTES
A notification letter was sent to the patient explaining that a recent imaging exam showed an incidental finding, for which follow-up testing was recommended.

## 2021-08-17 PROBLEM — IMO0002 UNCONTROLLED DIABETES MELLITUS: Status: ACTIVE | Noted: 2021-01-01

## 2021-08-17 PROBLEM — E87.5 HYPERKALEMIA: Status: ACTIVE | Noted: 2021-01-01

## 2021-08-17 NOTE — CONSULTS
Nephrology ([facility]) Consult Note      Patient:  Ena Upton  YOB: 1970  Date of Service: 8/17/2021  MRN: [id number]   Acct: [id number]   Primary Care Physician: Norm Wiggins APRN  Advance Directive:   There are no questions and answers to display.     Admit Date: 8/17/2021       Hospital Day: 0  Referring Provider: Marcello Lagos MD      Patient personally seen and examined.  Complete chart including Consults, Notes, Operative Reports, Labs, Cardiology, and Radiology studies reviewed as able.        Subjective:  Ena Upton is a 51 y.o. female  whom we were consulted for end-stage renal disease.  Patient routinely receives her dialysis Tuesday Thursday Saturday.  Last treatment on Saturday without issue.  She had a doctor's appointment scheduled for today so elected to not proceed with her routine dialysis on schedule.  She later developed weakness and presented to emergency room for evaluation.  She was found to have blood glucose greater than 1000 along with hyponatremia, hyperkalemia.  Patient admitted to the hospitalist service and we were consulted for the metabolic derangements.  Patient admitted to shortness of air and weakness.  Denied chest pain, nausea or vomiting.    Dialysis   Pt was seen on RRT at 1740  Modality: Hemodialysis  Access: Arterial Venous Fistula  Location: left upper  QB: 350  QD: 500  UF: 1300        Allergies:  Lamisil [terbinafine], Penicillins, Reglan [metoclopramide], and Stadol [butorphanol]    Home Meds:  (Not in a hospital admission)      Medicines:  Current Facility-Administered Medications   Medication Dose Route Frequency Provider Last Rate Last Admin   • dextrose (D50W) 25 g/ 50mL Intravenous Solution 25-50 mL  25-50 mL Intravenous Q30 Min PRN Marcello Lagos MD       • insulin regular (HumuLIN R,NovoLIN R) 100 Units in sodium chloride 0.9 % 100 mL (1 Units/mL) infusion  1-20 Units/hr Intravenous Titrated  Marcello Lagos MD 4 mL/hr at 08/17/21 1546 4 Units/hr at 08/17/21 1546   • insulin regular (humuLIN R,novoLIN R) injection 7 Units  7 Units Intravenous TID AC Marcello Lagos MD       • sodium chloride 0.9 % flush 10 mL  10 mL Intravenous PRN Marcello Lagos MD         Current Outpatient Medications   Medication Sig Dispense Refill   • albuterol (PROVENTIL HFA;VENTOLIN HFA) 108 (90 BASE) MCG/ACT inhaler Inhale 2 puffs Every 4 (Four) Hours As Needed for wheezing.     • carvedilol (COREG) 12.5 MG tablet Take 1 tablet by mouth 2 (Two) Times a Day With Meals.     • cloNIDine (CATAPRES) 0.2 MG tablet Take 1 tablet by mouth Every 8 (Eight) Hours.     • DULoxetine (CYMBALTA) 60 MG capsule Take 60 mg by mouth daily.     • gabapentin (NEURONTIN) 300 MG capsule Take 1 capsule by mouth Every Night for 3 days. 3 capsule 0   • hydrALAZINE (APRESOLINE) 100 MG tablet Take 1 tablet by mouth 3 (Three) Times a Day.     • insulin detemir (LEVEMIR) 100 UNIT/ML injection Inject 15 Units under the skin into the appropriate area as directed Daily.  12   • lactulose (CHRONULAC) 10 GM/15ML solution Take 20 g by mouth 3 (Three) Times a Day.     • levothyroxine (SYNTHROID, LEVOTHROID) 137 MCG tablet Take 1 tablet by mouth Daily.     • lisinopril (PRINIVIL,ZESTRIL) 20 MG tablet Take 20 mg by mouth 2 (two) times a day.     • NIFEdipine XL (PROCARDIA XL) 90 MG 24 hr tablet Take 1 tablet by mouth Daily.     • ondansetron (ZOFRAN) 4 MG tablet Take 4 mg by mouth Every 8 (Eight) Hours As Needed for nausea or vomiting.     • orphenadrine (NORFLEX) 100 MG 12 hr tablet Take 1 tablet by mouth Every 12 (Twelve) Hours for 3 days. 6 tablet 0   • rOPINIRole (REQUIP) 4 MG tablet Take 4 mg by mouth Every Night.     • senna (SENOKOT) 8.6 MG tablet tablet Take 1 tablet by mouth Every Night.     • simvastatin (ZOCOR) 20 MG tablet Take 20 mg by mouth Daily.         Past Medical History:  Past Medical History:   Diagnosis Date   • Anxiety     • Arthritis    • Chronic kidney disease     STAGE V RENAL FAILURE   • Depression    • Diabetes mellitus (CMS/HCC)    • HTN (hypertension)    • Hypothyroidism    • Obesity    • Tremors of nervous system        Past Surgical History:  Past Surgical History:   Procedure Laterality Date   • ARTERIOVENOUS FISTULA     • BREAST BIOPSY     • CARPAL TUNNEL RELEASE     • CHOLECYSTECTOMY     • TUBAL ABDOMINAL LIGATION         Family History  Family History   Problem Relation Age of Onset   • Cancer Other    • Hypertension Other    • Kidney disease Other    • Heart disease Other    • Diabetes Other    • Diabetes Mother    • Diabetes Maternal Aunt    • Diabetes Maternal Grandmother        Social History  Social History     Socioeconomic History   • Marital status:      Spouse name: Not on file   • Number of children: Not on file   • Years of education: Not on file   • Highest education level: Not on file   Tobacco Use   • Smoking status: Current Every Day Smoker     Packs/day: 0.50   • Smokeless tobacco: Never Used   • Tobacco comment: CUTTING BACK AND TRYING TO STOP SMOKING   Substance and Sexual Activity   • Alcohol use: No   • Drug use: No   • Sexual activity: Defer         Review of Systems:  History obtained from chart review and the patient  General ROS: No fever or chills  Respiratory ROS: +cough, shortness of breath  Cardiovascular ROS: No chest pain or palpitations  Gastrointestinal ROS: No abdominal pain or melena  Genito-Urinary ROS: No dysuria or hematuria  14 point ROS reviewed with the patient and negative except as noted above and in the HPI unless unable to obtain.    Objective:  Patient Vitals for the past 24 hrs:   BP Temp Temp src Pulse Resp SpO2 Height Weight   08/17/21 1616 155/90 -- -- 63 -- 100 % -- --   08/17/21 1601 127/58 -- -- 60 -- 100 % -- --   08/17/21 1553 -- -- -- 61 18 100 % -- --   08/17/21 1434 112/70 97.4 °F (36.3 °C) Oral -- -- -- -- --   08/17/21 1429 -- -- -- -- 18 -- -- --  "  08/17/21 1428 -- -- -- 58 -- 98 % 167.6 cm (66\") 63.5 kg (140 lb)       Intake/Output Summary (Last 24 hours) at 8/17/2021 1629  Last data filed at 8/17/2021 1623  Gross per 24 hour   Intake 1100 ml   Output --   Net 1100 ml     General: awake/alert   Chest:  Bilateral crackles  CVS: regular rate and rhythm  Abdominal: soft, nontender, positive bowel sounds  Extremities: ble edema  Skin: warm and dry without rash      Labs:  Results from last 7 days   Lab Units 08/17/21  1431   WBC 10*3/mm3 6.75   HEMOGLOBIN g/dL 8.8*   HEMATOCRIT % 25.8*   PLATELETS 10*3/mm3 137*         Results from last 7 days   Lab Units 08/17/21  1431   SODIUM mmol/L 110*   POTASSIUM mmol/L 7.3*   CHLORIDE mmol/L 72*   CO2 mmol/L 18.0*   BUN mg/dL 86*   CREATININE mg/dL 5.22*   CALCIUM mg/dL 8.9   BILIRUBIN mg/dL 0.6   ALK PHOS U/L 116   ALT (SGPT) U/L 8   AST (SGOT) U/L 12   GLUCOSE mg/dL 1,126*       Radiology:   Imaging Results (Last 72 Hours)     Procedure Component Value Units Date/Time    XR Chest 1 View [[id number]] Collected: 08/17/21 1608     Updated: 08/17/21 1615    Narrative:      EXAM: XR CHEST 1 VW-     HISTORY: weakness; R73.9-Hyperglycemia, unspecified; E87.5-Hyperkalemia;  E87.7-Pbww-udwqxugswa and hyponatremia; R53.1-Weakness       COMPARISON: June 19, 2021.     TECHNIQUE: Frontal radiograph of the chest     FINDINGS:   Bilateral interstitial infiltrates are present. This has appearance of  pulmonary vascular congestion.. Cardiac silhouette is mildly enlarged.  Left subclavian endovascular stent graft is present..      The osseous structures and surrounding soft tissues demonstrate no acute  abnormality.          Impression:      1. Cardiomegaly with bilateral interstitial infiltrates most likely  representing mild or early changes of pulmonary vascular congestion.        This report was finalized on 08/17/2021 16:11 by Dr. Lazaro Martinez MD.          Culture:  No results found for: BLOODCX, URINECX, WOUNDCX, MRSACX, RESPCX, " STOOLCX      Assessment   End-stage renal disease  Hypertension  Hyponatremia  Hyperkalemia  Anemia chronic kidney disease  Secondary hyperparathyroidism  Acute pulmonary edema    Plan:  Discussed with patient, nursing, primary  Work-up ordered and ongoing  Awaiting ICU bed for initiation of dialysis, nursing aware of critical nature  Monitor labs  Medically manage potassium until dialysis available    Thank you for the consult, we appreciate the opportunity to provide care to your patients.  Feel free to contact me if I can be of any further assistance.      Surjit Avitia MD  8/17/2021  16:29 CDT

## 2021-08-17 NOTE — H&P
[facility]  HISTORY AND PHYSICAL    Date of Admission: 8/17/2021  Primary Care Physician: Norm Wiggins APRN    Subjective     Chief Complaint: Hyperkalemia.  Renal failure on dialysis.  Hyperglycemia.    History of Present Illness  Patient is a 51-year-old  female came to the ER for evaluation of hyperglycemia and weakness.  Patient has missed his dialysis appointment.  ER physician discussed plan for dialysis today.  Patient is a chronic diabetes, glucose was in the thousand in ER.  Patient will have dialysis Tuesday Thursday and Saturday.  Patient missed Tuesday only.  Patient states she feels weak all day long and check her sugar is too high to read.  Patient was so weak to get around family proceeded to call 911.  Patient was brought to ER to be evaluated.  Patient is on chronic dialysis.    Review of Systems   Constitutional: Positive for activity change, appetite change and fatigue. Negative for chills and fever.   HENT: Negative for hearing loss, nosebleeds, tinnitus and trouble swallowing.    Eyes: Negative for visual disturbance.   Respiratory: Positive for shortness of breath and wheezing. Negative for cough and chest tightness.    Cardiovascular: Positive for leg swelling. Negative for chest pain and palpitations.   Gastrointestinal: Negative for abdominal distention, abdominal pain, blood in stool, constipation, diarrhea, nausea and vomiting.   Endocrine: Negative for cold intolerance, heat intolerance, polydipsia, polyphagia and polyuria.   Genitourinary: Negative for decreased urine volume, difficulty urinating, dysuria, flank pain, frequency and hematuria.   Musculoskeletal: Negative for arthralgias, joint swelling and myalgias.   Skin: Negative for rash.   Allergic/Immunologic: Negative for immunocompromised state.   Neurological: Positive for weakness. Negative for dizziness, syncope, light-headedness and headaches.    Hematological: Negative for adenopathy. Does not bruise/bleed easily.   Psychiatric/Behavioral: Negative for confusion and sleep disturbance. The patient is not nervous/anxious.         Otherwise complete ROS reviewed and negative except as mentioned in the HPI.      Past Medical History:   Past Medical History:   Diagnosis Date   • Anxiety    • Arthritis    • Chronic kidney disease     STAGE V RENAL FAILURE   • Depression    • Diabetes mellitus (CMS/HCC)    • HTN (hypertension)    • Hypothyroidism    • Obesity    • Tremors of nervous system        Past Surgical History:  Past Surgical History:   Procedure Laterality Date   • ARTERIOVENOUS FISTULA     • BREAST BIOPSY     • CARPAL TUNNEL RELEASE     • CHOLECYSTECTOMY     • TUBAL ABDOMINAL LIGATION         Family History: family history includes Cancer in an other family member; Diabetes in her maternal aunt, maternal grandmother, mother, and another family member; Heart disease in an other family member; Hypertension in an other family member; Kidney disease in an other family member.    Social History:  reports that she has been smoking. She has been smoking about 0.50 packs per day. She has never used smokeless tobacco. She reports that she does not drink alcohol and does not use drugs.    Medications:  Prior to Admission medications    Medication Sig Start Date End Date Taking? Authorizing Provider   albuterol (PROVENTIL HFA;VENTOLIN HFA) 108 (90 BASE) MCG/ACT inhaler Inhale 2 puffs Every 4 (Four) Hours As Needed for wheezing.    ProviderBlanche MD   carvedilol (COREG) 12.5 MG tablet Take 1 tablet by mouth 2 (Two) Times a Day With Meals. 6/29/21   Adán Merrill, DO   cloNIDine (CATAPRES) 0.2 MG tablet Take 1 tablet by mouth Every 8 (Eight) Hours. 6/29/21   Adán Merrill,    DULoxetine (CYMBALTA) 60 MG capsule Take 60 mg by mouth daily.    Provider, MD Blanche   gabapentin (NEURONTIN) 300 MG capsule Take 1 capsule by mouth Every Night for 3  "days. 6/29/21 7/2/21  Adán Merrill DO   hydrALAZINE (APRESOLINE) 100 MG tablet Take 1 tablet by mouth 3 (Three) Times a Day. 6/29/21   Adán Merrill DO   insulin detemir (LEVEMIR) 100 UNIT/ML injection Inject 15 Units under the skin into the appropriate area as directed Daily. 6/30/21   Adán Merrill DO   lactulose (CHRONULAC) 10 GM/15ML solution Take 20 g by mouth 3 (Three) Times a Day.    Blanche Neil MD   levothyroxine (SYNTHROID, LEVOTHROID) 137 MCG tablet Take 1 tablet by mouth Daily. 6/30/21   Adán Merrill DO   lisinopril (PRINIVIL,ZESTRIL) 20 MG tablet Take 20 mg by mouth 2 (two) times a day.    Blanche Neil MD   NIFEdipine XL (PROCARDIA XL) 90 MG 24 hr tablet Take 1 tablet by mouth Daily. 6/30/21   Adán Merrill DO   ondansetron (ZOFRAN) 4 MG tablet Take 4 mg by mouth Every 8 (Eight) Hours As Needed for nausea or vomiting.    Blanche Neil MD   orphenadrine (NORFLEX) 100 MG 12 hr tablet Take 1 tablet by mouth Every 12 (Twelve) Hours for 3 days. 6/29/21 7/2/21  Adán Merrill DO   rOPINIRole (REQUIP) 4 MG tablet Take 4 mg by mouth Every Night.    Blanche Neil MD   senna (SENOKOT) 8.6 MG tablet tablet Take 1 tablet by mouth Every Night.    Blanche Neil MD   simvastatin (ZOCOR) 20 MG tablet Take 20 mg by mouth Daily.    Blanche Neil MD     Allergies:  Allergies   Allergen Reactions   • Lamisil [Terbinafine]    • Penicillins    • Reglan [Metoclopramide] GI Intolerance   • Stadol [Butorphanol] Nausea And Vomiting       Objective     Vital Signs: /90   Pulse 63   Temp 97.4 °F (36.3 °C) (Oral)   Resp 18   Ht 167.6 cm (66\")   Wt 63.5 kg (140 lb)   LMP  (LMP Unknown)   SpO2 100%   BMI 22.60 kg/m²   Physical Exam  Vitals and nursing note reviewed.   Constitutional:       Appearance: She is well-developed.   HENT:      Head: Normocephalic.   Eyes:      Conjunctiva/sclera: Conjunctivae normal.      Pupils: Pupils are equal, " round, and reactive to light.   Neck:      Vascular: No JVD.   Cardiovascular:      Rate and Rhythm: Normal rate and regular rhythm.      Heart sounds: Normal heart sounds. No murmur heard.   No friction rub. No gallop.    Pulmonary:      Effort: No respiratory distress.      Breath sounds: No wheezing or rales.      Comments: Diminished breath sound bilateral, clear  Chest:      Chest wall: No tenderness.   Abdominal:      General: Bowel sounds are normal. There is no distension.      Palpations: Abdomen is soft.      Tenderness: There is no abdominal tenderness. There is no guarding or rebound.   Musculoskeletal:         General: No tenderness or deformity. Normal range of motion.      Cervical back: Neck supple.   Skin:     General: Skin is warm and dry.      Capillary Refill: Capillary refill takes 2 to 3 seconds.      Findings: No rash.   Neurological:      Mental Status: She is alert and oriented to person, place, and time.      Cranial Nerves: No cranial nerve deficit.      Motor: Weakness present. No abnormal muscle tone.      Deep Tendon Reflexes: Reflexes normal.   Psychiatric:         Behavior: Behavior normal.         Thought Content: Thought content normal.         Judgment: Judgment normal.              Results Reviewed:    Lab Results (last 24 hours)     Procedure Component Value Units Date/Time    COVID-19,[facility] IN-HOUSE,Nasal Swab No Transport Media 3-4 HR TAT - Swab, Nasal Cavity [[id number]]  (Normal) Collected: 08/17/21 1548    Specimen: Swab from Nasal Cavity Updated: 08/17/21 1642     COVID19 Not Detected    Narrative:      Fact sheet for providers: https://www.fda.gov/media/196246/download     Fact sheet for patients: https://www.fda.gov/media/296597/download    Test performed by PCR.    Consider negative results in combination with clinical observations, patient history, and epidemiological information.    Blood Gas, Arterial - [[id number]]  (Abnormal) Collected: 08/17/21 1550    Specimen:  Arterial Blood Updated: 08/17/21 1559     Site Right Radial     Aaron's Test N/A     pH, Arterial 7.370 pH units      pCO2, Arterial 36.8 mm Hg      pO2, Arterial 61.6 mm Hg      Comment: 84 Value below reference range        HCO3, Arterial 21.2 mmol/L      Base Excess, Arterial -3.7 mmol/L      Comment: 84 Value below reference range        O2 Saturation, Arterial 89.8 %      Comment: 84 Value below reference range        Temperature 37.0 C      Barometric Pressure for Blood Gas 748 mmHg      Modality Room Air     Ventilator Mode NA     Collected by [id number]     Comment: Meter: [id number]     :  [id number]        pCO2, Temperature Corrected 36.8 mm Hg      pH, Temp Corrected 7.370 pH Units      pO2, Temperature Corrected 61.6 mm Hg     Osmolality, Serum [[id number]]  (Abnormal) Collected: 08/17/21 1432    Specimen: Blood Updated: 08/17/21 1555     Osmolality 331 mOsm/kg     Phosphorus [[id number]]  (Abnormal) Collected: 08/17/21 1431    Specimen: Blood Updated: 08/17/21 1554     Phosphorus 5.4 mg/dL     [place] Draw [[id number]] Collected: 08/17/21 1431    Specimen: Blood Updated: 08/17/21 1545    Narrative:      The following orders were created for panel order [place] [facility].  Procedure                               Abnormality         Status                     ---------                               -----------         ------                     Green Top (Gel)[[id number]]                                  Final result               Lavender Top[[id number]]                                     Final result               Red Top[[id number]]                                          Final result                 Please view results for these tests on the individual orders.    Green Top (Gel) [[id number]] Collected: 08/17/21 1431    Specimen: Blood Updated: 08/17/21 1545     Extra Tube Hold for add-ons.     Comment: Auto resulted.       Red Top [[id number]] Collected: 08/17/21 1432    Specimen: Blood Updated: 08/17/21  1545     Extra Tube Hold for add-ons.     Comment: Auto resulted.       Lavender Top [[id number]] Collected: 08/17/21 1431    Specimen: Blood Updated: 08/17/21 1545     Extra Tube hold for add-on     Comment: Auto resulted       Comprehensive Metabolic Panel [[id number]]  (Abnormal) Collected: 08/17/21 1431    Specimen: Blood Updated: 08/17/21 1522     Glucose 1,126 mg/dL      BUN 86 mg/dL      Creatinine 5.22 mg/dL      Sodium 110 mmol/L      Potassium 7.3 mmol/L      Chloride 72 mmol/L      CO2 18.0 mmol/L      Calcium 8.9 mg/dL      Total Protein 7.2 g/dL      Albumin 4.10 g/dL      ALT (SGPT) 8 U/L      AST (SGOT) 12 U/L      Alkaline Phosphatase 116 U/L      Total Bilirubin 0.6 mg/dL      eGFR Non African Amer 9 mL/min/1.73      Comment: <15 Indicative of kidney failure.        eGFR   Amer --     Comment: <15 Indicative of kidney failure.        Globulin 3.1 gm/dL      A/G Ratio 1.3 g/dL      BUN/Creatinine Ratio 16.5     Anion Gap 20.0 mmol/L     Narrative:      GFR Normal >60  Chronic Kidney Disease <60  Kidney Failure <15      Troponin [[id number]]  (Abnormal) Collected: 08/17/21 1431    Specimen: Blood Updated: 08/17/21 1509     Troponin T 0.186 ng/mL     Narrative:      Troponin T Reference Range:  <= 0.03 ng/mL-   Negative for AMI  >0.03 ng/mL-     Abnormal for myocardial necrosis.  Clinicians would have to utilize clinical acumen, EKG, Troponin and serial changes to determine if it is an Acute Myocardial Infarction or myocardial injury due to an underlying chronic condition.       Results may be falsely decreased if patient taking Biotin.      Magnesium [[id number]]  (Normal) Collected: 08/17/21 1431    Specimen: Blood Updated: 08/17/21 1502     Magnesium 2.6 mg/dL     CBC & Differential [[id number]]  (Abnormal) Collected: 08/17/21 1431    Specimen: Blood Updated: 08/17/21 1455    Narrative:      The following orders were created for panel order CBC & Differential.  Procedure                                Abnormality         Status                     ---------                               -----------         ------                     CBC Auto Differential[[id number]]        Abnormal            Final result                 Please view results for these tests on the individual orders.    CBC Auto Differential [[id number]]  (Abnormal) Collected: 08/17/21 1431    Specimen: Blood Updated: 08/17/21 1455     WBC 6.75 10*3/mm3      RBC 2.99 10*6/mm3      Hemoglobin 8.8 g/dL      Hematocrit 25.8 %      MCV 86.3 fL      MCH 29.4 pg      MCHC 34.1 g/dL      RDW 14.3 %      RDW-SD 45.0 fl      MPV 10.8 fL      Platelets 137 10*3/mm3      Neutrophil % 79.0 %      Lymphocyte % 11.0 %      Monocyte % 8.0 %      Eosinophil % 0.4 %      Basophil % 1.3 %      Immature Grans % 0.3 %      Neutrophils, Absolute 5.33 10*3/mm3      Lymphocytes, Absolute 0.74 10*3/mm3      Monocytes, Absolute 0.54 10*3/mm3      Eosinophils, Absolute 0.03 10*3/mm3      Basophils, Absolute 0.09 10*3/mm3      Immature Grans, Absolute 0.02 10*3/mm3      nRBC 0.0 /100 WBC            Radiology Data:    Imaging Results (Last 24 Hours)     Procedure Component Value Units Date/Time    XR Chest 1 View [[id number]] Collected: 08/17/21 1608     Updated: 08/17/21 1615    Narrative:      EXAM: XR CHEST 1 VW-     HISTORY: weakness; R73.9-Hyperglycemia, unspecified; E87.5-Hyperkalemia;  E87.3-Chzm-biwgqejger and hyponatremia; R53.1-Weakness       COMPARISON: June 19, 2021.     TECHNIQUE: Frontal radiograph of the chest     FINDINGS:   Bilateral interstitial infiltrates are present. This has appearance of  pulmonary vascular congestion.. Cardiac silhouette is mildly enlarged.  Left subclavian endovascular stent graft is present..      The osseous structures and surrounding soft tissues demonstrate no acute  abnormality.          Impression:      1. Cardiomegaly with bilateral interstitial infiltrates most likely  representing mild or early changes of pulmonary vascular  congestion.        This report was finalized on 08/17/2021 16:11 by Dr. Lazaro Martinez MD.          I have personally reviewed and interpreted the radiology studies and ECG obtained at time of admission.     Assessment / Plan      Assessment & Plan  Active Hospital Problems    Diagnosis    • Hyperkalemia    • Uncontrolled diabetes mellitus (CMS/HCC)    • End stage kidney disease (CMS/HCC)    • Hyperglycemia    • Gastroparesis due to DM (CMS/HCC)    • Intractable nausea and vomiting      Plan.    Hyperglycemia.  Plan for dialysis now stat.  Patient start insulin drip in ER.    Chronic renal failure on dialysis.  Plan for dialysis today.    Hyperkalemia.  After dialysis now stat.    Nausea/vomiting.  Pepcid.  Zofran.    Hypertension/hyperlipidemia.  Coreg.  Catapres.  Hydralazine.  Hold lisinopril due to hyperkalemia.  Procardia.  Zocor.    Anxiety/depression.  Cymbalta.    Neuropathy.  Neurontin.  Requip at night.    Chronic smoker.  Nicotine patch.  Discussed patient cutting back and stopping.    Constipation . Lactulose.  Senokot.    Hypothyroidism.  Synthroid.    Code Status: full code     I discussed the patient's findings and my recommendations with: patient    Estimated length of stay: 2-4 days.     Electronically signed by Narinder Madrid MD, 08/17/21, 4:17 PM CDT.

## 2021-08-17 NOTE — ED PROVIDER NOTES
"Subjective   51-year-old female presents to the emergency department with complaint of generalized weakness hyperglycemia.  She has a history of diabetes, stage renal disease on dialysis Tuesday Thursday Saturday, missed dialysis today(Tuesday) because she had an appointment with the surgeon who plans to operate on her back.  She also has a history of hypertension and hypothyroidism and peripheral neuropathy.  Patient states she is been feeling generally weak throughout the day.  She checked her blood glucose but it read \"high\".  After patient returned home from her doctor's appointment she was so weak she states all she could do was lay in the floor and could not get up so she called 911.  She does continue to make small amount of urine.  No dysuria hematuria, no fever, no cough or congestion, no chest pain or shortness of breath.  Rates her symptoms as moderate to severe with no aggravating alleviating factors.      History provided by:  Patient      Review of Systems   All other systems reviewed and are negative.      Past Medical History:   Diagnosis Date   • Anxiety    • Arthritis    • Chronic kidney disease     STAGE V RENAL FAILURE   • Depression    • Diabetes mellitus (CMS/HCC)    • HTN (hypertension)    • Hypothyroidism    • Obesity    • Tremors of nervous system        Allergies   Allergen Reactions   • Lamisil [Terbinafine]    • Penicillins    • Reglan [Metoclopramide] GI Intolerance   • Stadol [Butorphanol] Nausea And Vomiting       Past Surgical History:   Procedure Laterality Date   • ARTERIOVENOUS FISTULA     • BREAST BIOPSY     • CARPAL TUNNEL RELEASE     • CHOLECYSTECTOMY     • TUBAL ABDOMINAL LIGATION         Family History   Problem Relation Age of Onset   • Cancer Other    • Hypertension Other    • Kidney disease Other    • Heart disease Other    • Diabetes Other    • Diabetes Mother    • Diabetes Maternal Aunt    • Diabetes Maternal Grandmother        Social History     Socioeconomic History   • " Marital status:      Spouse name: Not on file   • Number of children: Not on file   • Years of education: Not on file   • Highest education level: Not on file   Tobacco Use   • Smoking status: Current Every Day Smoker     Packs/day: 0.50   • Smokeless tobacco: Never Used   • Tobacco comment: CUTTING BACK AND TRYING TO STOP SMOKING   Substance and Sexual Activity   • Alcohol use: No   • Drug use: No   • Sexual activity: Defer           Objective   Physical Exam  Vitals and nursing note reviewed.   Constitutional:       Appearance: She is normal weight. She is ill-appearing.      Comments: Generally weak appearing female who appears older than her stated age who appears ill but is in no distress   HENT:      Head: Normocephalic and atraumatic.      Nose: Nose normal.      Mouth/Throat:      Mouth: Mucous membranes are moist.      Pharynx: Oropharynx is clear. No oropharyngeal exudate or posterior oropharyngeal erythema.   Eyes:      Extraocular Movements: Extraocular movements intact.      Conjunctiva/sclera: Conjunctivae normal.      Pupils: Pupils are equal, round, and reactive to light.   Cardiovascular:      Rate and Rhythm: Normal rate and regular rhythm.      Pulses: Normal pulses.      Heart sounds: Normal heart sounds.   Pulmonary:      Effort: Pulmonary effort is normal.      Breath sounds: Normal breath sounds. No wheezing, rhonchi or rales.   Abdominal:      General: Abdomen is flat. Bowel sounds are normal. There is no distension.      Palpations: Abdomen is soft.      Tenderness: There is no abdominal tenderness.   Musculoskeletal:         General: No tenderness. Normal range of motion.      Cervical back: Normal range of motion and neck supple. No rigidity. No muscular tenderness.      Right lower leg: No edema.      Left lower leg: No edema.   Skin:     General: Skin is warm and dry.      Capillary Refill: Capillary refill takes less than 2 seconds.      Findings: No rash.   Neurological:       General: No focal deficit present.      Mental Status: She is alert and oriented to person, place, and time. Mental status is at baseline.      Cranial Nerves: No cranial nerve deficit.      Sensory: No sensory deficit.      Motor: No weakness.   Psychiatric:         Mood and Affect: Mood normal.         Behavior: Behavior normal.         Thought Content: Thought content normal.         ECG 12 Lead      Date/Time: 8/17/2021 3:26 PM  Performed by: Marcello Lagos MD  Authorized by: Marcello Lagos MD   Comments: Sinus bradycardia at rate 58, first-degree AV block, left axis deviation, peaked T waves concerning for hyperkalemia, QRS duration normal, no acute ischemic changes, abnormal EKG             Lab Results (last 24 hours)     Procedure Component Value Units Date/Time    CBC & Differential [[id number]]  (Abnormal) Collected: 08/17/21 1431    Specimen: Blood Updated: 08/17/21 1455    Narrative:      The following orders were created for panel order CBC & Differential.  Procedure                               Abnormality         Status                     ---------                               -----------         ------                     CBC Auto Differential[[id number]]        Abnormal            Final result                 Please view results for these tests on the individual orders.    Magnesium [[id number]]  (Normal) Collected: 08/17/21 1431    Specimen: Blood Updated: 08/17/21 1502     Magnesium 2.6 mg/dL     Comprehensive Metabolic Panel [[id number]]  (Abnormal) Collected: 08/17/21 1431    Specimen: Blood Updated: 08/17/21 1522     Glucose 1,126 mg/dL      BUN 86 mg/dL      Creatinine 5.22 mg/dL      Sodium 110 mmol/L      Potassium 7.3 mmol/L      Chloride 72 mmol/L      CO2 18.0 mmol/L      Calcium 8.9 mg/dL      Total Protein 7.2 g/dL      Albumin 4.10 g/dL      ALT (SGPT) 8 U/L      AST (SGOT) 12 U/L      Alkaline Phosphatase 116 U/L      Total Bilirubin 0.6 mg/dL      eGFR Non  Amer  9 mL/min/1.73      Comment: <15 Indicative of kidney failure.        eGFR   Amer --     Comment: <15 Indicative of kidney failure.        Globulin 3.1 gm/dL      A/G Ratio 1.3 g/dL      BUN/Creatinine Ratio 16.5     Anion Gap 20.0 mmol/L     Narrative:      GFR Normal >60  Chronic Kidney Disease <60  Kidney Failure <15      Troponin [[id number]]  (Abnormal) Collected: 08/17/21 1431    Specimen: Blood Updated: 08/17/21 1509     Troponin T 0.186 ng/mL     Narrative:      Troponin T Reference Range:  <= 0.03 ng/mL-   Negative for AMI  >0.03 ng/mL-     Abnormal for myocardial necrosis.  Clinicians would have to utilize clinical acumen, EKG, Troponin and serial changes to determine if it is an Acute Myocardial Infarction or myocardial injury due to an underlying chronic condition.       Results may be falsely decreased if patient taking Biotin.      CBC Auto Differential [[id number]]  (Abnormal) Collected: 08/17/21 1431    Specimen: Blood Updated: 08/17/21 1455     WBC 6.75 10*3/mm3      RBC 2.99 10*6/mm3      Hemoglobin 8.8 g/dL      Hematocrit 25.8 %      MCV 86.3 fL      MCH 29.4 pg      MCHC 34.1 g/dL      RDW 14.3 %      RDW-SD 45.0 fl      MPV 10.8 fL      Platelets 137 10*3/mm3      Neutrophil % 79.0 %      Lymphocyte % 11.0 %      Monocyte % 8.0 %      Eosinophil % 0.4 %      Basophil % 1.3 %      Immature Grans % 0.3 %      Neutrophils, Absolute 5.33 10*3/mm3      Lymphocytes, Absolute 0.74 10*3/mm3      Monocytes, Absolute 0.54 10*3/mm3      Eosinophils, Absolute 0.03 10*3/mm3      Basophils, Absolute 0.09 10*3/mm3      Immature Grans, Absolute 0.02 10*3/mm3      nRBC 0.0 /100 WBC     Phosphorus [[id number]] Collected: 08/17/21 1431    Specimen: Blood Updated: 08/17/21 1544    Osmolality, Serum [[id number]] Collected: 08/17/21 1432    Specimen: Blood Updated: 08/17/21 1529      No Radiology Exams Resulted Within Past 24 Hours    ED Course  ED Course as of Aug 17 1552   Tue Aug 17, 2021   1535 51-year-old  "female with history of end-stage renal disease on hemodialysis Tuesday Thursday Saturday but skipped dialysis today(Tuesday), diabetes, hypertension who presents emergency department with complaint of generalized weakness and a glucose reading of \"high\" on her glucometer at home.    Will need admission for hyperglycemia, end-stage renal disease with hyperkalemia to 7.3.    On arrival to the ER, patient generally weak appearing but was not in any acute distress.  EKG with peaked T waves but no QRS widening.   CMP reveals glucose 1126, sodium 110(corrects to 128-135), potassium 7.3, BUN 86, creatinine 5.22.  She does have a metabolic acidosis, bicarb was 18, anion gap 20.  Troponin elevated but near baseline, likely not related to ACS in setting of end-stage renal disease.    Discussed case with hospitalist, will admit the patient to the intensive care unit.  We have called nephrology for emergency dialysis, return call pending.  In the interim, the patient's hyperkalemia, will go ahead and give bicarb, insulin, calcium gluconate and hour-long neb treatment.      [AW]   1552 Discussed case with nephrology, Dr. Avitia.  Will take patient for emergency dialysis.    [AW]      ED Course User Index  [AW] Marcello Lagos MD                                           MDM  Number of Diagnoses or Management Options  Generalized weakness: new and requires workup  Hyperglycemia: new and requires workup  Hyperkalemia: new and requires workup  Hyponatremia: new and requires workup     Amount and/or Complexity of Data Reviewed  Clinical lab tests: reviewed  Tests in the radiology section of CPT®: reviewed  Decide to obtain previous medical records or to obtain history from someone other than the patient: yes    Risk of Complications, Morbidity, and/or Mortality  Presenting problems: high  Diagnostic procedures: high  Management options: high    Patient Progress  Patient progress: stable      Final diagnoses:   Hyperkalemia "   Hyperglycemia   Hyponatremia   Generalized weakness       ED Disposition  ED Disposition     ED Disposition Condition Comment    Decision to Admit  Level of Care: Critical Care [6]   Diagnosis: Hyperglycemia [137478]   Admitting Physician: ROMAN PRIETO [[id number]]   Attending Physician: ROMAN PRIETO [[id number]]   Isolate for COVID?: No [0]   Certification: I Certify That Inpatient Hospital Services Are Medically Necessary For Greater Than 2 Midnights            No follow-up provider specified.       Medication List      No changes were made to your prescriptions during this visit.          Marcello Lagos MD  08/17/21 [id number]

## 2021-08-18 NOTE — THERAPY EVALUATION
Patient Name: Ena Upton  : 1970    MRN: [id number]                              Today's Date: 2021       Admit Date: 2021    Visit Dx:     ICD-10-CM ICD-9-CM   1. Hyperglycemia  R73.9 790.29   2. Hyperkalemia  E87.5 276.7   3. Hyponatremia  E87.1 276.1   4. Generalized weakness  R53.1 780.79   5. Impaired mobility  Z74.09 799.89   6. 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)   • Non-ketotic hyperosmolar coma (CMS/HCC)   • HTN (hypertension)   • Intractable nausea and vomiting   • Constipation   • Gastroparesis due to DM (CMS/HCC)   • Hyperglycemia   • Seizure (CMS/HCC)   • End stage kidney disease (CMS/HCC)   • Sacral insufficiency fracture   • Hypertensive urgency   • Uncontrolled diabetes mellitus (CMS/HCC)   • Hyperkalemia     Past Medical History:   Diagnosis Date   • Anxiety    • Arthritis    • Chronic kidney disease     STAGE V RENAL FAILURE   • Depression    • Diabetes mellitus (CMS/HCC)    • HTN (hypertension)    • Hypothyroidism    • Obesity    • Tremors of nervous system      Past Surgical History:   Procedure Laterality Date   • ARTERIOVENOUS FISTULA     • BREAST BIOPSY     • CARPAL TUNNEL RELEASE     • CHOLECYSTECTOMY     • TUBAL ABDOMINAL LIGATION       General Information     Row Name 21 1406          OT Time and Intention    Document Type  evaluation Hyperglycemic/weakness, Cardiomegaly with B intertistial infiltratres, Gastroparesis, ESRD with dialysis, Pulmonary/vascular congestion  -JJ     Mode of Treatment  occupational therapy  -JJ     Row Name 21 1406 21 1405       General Information    Patient Profile Reviewed  yes  -JJ  --    Prior Level of Function  independent:;all household mobility;transfer;bed mobility;ADL's  -JJ  --    Existing Precautions/Restrictions  fall  -JJ  fall  -JJ    Barriers to Rehab  medically complex;environmental  barriers Pt states she lives alone which is a barrier to her health  -  --    Row Name 08/18/21 1406 08/18/21 1405       Living Environment    Lives With  alone  -  alone  -    Row Name 08/18/21 1406          Home Main Entrance    Number of Stairs, Main Entrance  four  -     Stair Railings, Main Entrance  railings on both sides of stairs  -     Row Name 08/18/21 1406          Stairs Within Home, Primary    Stairs, Within Home, Primary  No stairs inside the home, Pt has a shower tub that she states is an issue. pt desires a shower chair.  -     Row Name 08/18/21 1406          Cognition    Orientation Status (Cognition)  oriented x 4  -     Row Name 08/18/21 1406          Safety Issues, Functional Mobility    Safety Issues Affecting Function (Mobility)  safety precaution awareness;safety precautions follow-through/compliance  -     Impairments Affecting Function (Mobility)  balance;cognition;pain;range of motion (ROM);sensation/sensory awareness;shortness of breath;strength  -     Cognitive Impairments, Mobility Safety/Performance  awareness, need for assistance  -       User Key  (r) = Recorded By, (t) = Taken By, (c) = Cosigned By    Initials Name Provider Type     Desire Keita, KLAUDIAR/L Occupational Therapist          Mobility/ADL's     Row Name 08/18/21 1406          Bed Mobility    Comment (Bed Mobility)  up in chair upon entry to room  -     Row Name 08/18/21 1406          Transfers    Transfers  sit-stand transfer  -     Comment (Transfers)  sit <> stand x4 reps  -     Sit-Stand [place] (Transfers)  1 person assist;minimum assist (75% patient effort)  -     Row Name 08/18/21 1406          Sit-Stand Transfer    Assistive Device (Sit-Stand Transfers)  walker, front-wheeled  -     Row Name 08/18/21 1406          Functional Mobility    Functional Mobility- Ind. Level  minimum assist (75% patient effort);1 person  -     Functional Mobility- Device  rolling walker  -      Functional Mobility- Comment  in room. Pt fatigued quickly with activity.  -     Row Name 08/18/21 1406          Activities of Daily Living    BADL Assessment/Intervention  toileting;upper body dressing;lower body dressing  -[facility] Name 08/18/21 1406          Toileting Assessment/Training    [place] Level (Toileting)  perform perineal hygiene;change pad/brief;maximum assist (25% patient effort)  -J     Position (Toileting)  supported standing;supported sitting  -     Comment (Toileting)  incontinent of stool during session requiring Max A.  -[facility] Name 08/18/21 1406          Upper Body Dressing Assessment/Training    [place] Level (Upper Body Dressing)  don;doff;minimum assist (75% patient effort)  -JJ     Position (Upper Body Dressing)  supported sitting  -JJ     Comment (Upper Body Dressing)  hospital gown  -     Row Name 08/18/21 1406          Lower Body Dressing Assessment/Training    [place] Level (Lower Body Dressing)  don;doff;socks;maximum assist (25% patient effort)  -JJ     Position (Lower Body Dressing)  supported sitting  -       User Key  (r) = Recorded By, (t) = Taken By, (c) = Cosigned By    Initials Name Provider Type    JDesire Kearns, OTR/L Occupational Therapist        Obj/Interventions     [facility] Name 08/18/21 1406          Sensory Interventions    Comment, Sensory Intervention  Pt reports n/t inf fingertips of B hands.  -[facility] Name 08/18/21 1406          Range of Motion Comprehensive    General Range of Motion  bilateral upper extremity ROM WFL  -JJ     Row Name 08/18/21 1406          Strength Comprehensive (MMT)    Comment, General Manual Muscle Testing (MMT) Assessment  B UE strength grossly 4/5  -[facility] Name 08/18/21 1406          Balance    Balance Assessment  sitting static balance;standing static balance  -     Static Sitting Balance  WFL;sitting in chair;supported  -     Static Standing Balance  mild impairment;supported;standing  -        User Key  (r) = Recorded By, (t) = Taken By, (c) = Cosigned By    Initials Name Provider Type    Desire Villa OTR/L Occupational Therapist        Goals/Plan     Row Name 08/18/21 1405          Dressing Goal 1 (OT)    Activity/Device (Dressing Goal 1, OT)  dressing skills, all  -JJ     Lares/Cues Needed (Dressing Goal 1, OT)  contact guard assist  -JJ     Time Frame (Dressing Goal 1, OT)  long term goal (LTG);by discharge  -JJ     Progress/Outcome (Dressing Goal 1, OT)  goal ongoing  -JJ     Row Name 08/18/21 1405          Toileting Goal 1 (OT)    Activity/Device (Toileting Goal 1, OT)  toileting skills, all  -JJ     [place] Level/Cues Needed (Toileting Goal 1, OT)  independent  -JJ     Time Frame (Toileting Goal 1, OT)  long term goal (LTG);by discharge  -JJ     Progress/Outcome (Toileting Goal 1, OT)  goal ongoing  -J     Row Name 08/18/21 1405          Therapy Assessment/Plan (OT)    Planned Therapy Interventions (OT)  activity tolerance training;adaptive equipment training;BADL retraining;functional balance retraining;occupation/activity based interventions;patient/caregiver education/training;transfer/mobility retraining;strengthening exercise  -JJ       User Key  (r) = Recorded By, (t) = Taken By, (c) = Cosigned By    Initials Name Provider Type    Desire Villa OTR/L Occupational Therapist        Clinical Impression     Row Name 08/18/21 1406          Pain Assessment    Additional Documentation  Pain Scale: FACES Pre/Post-Treatment (Group)  -JJ     Row Name 08/18/21 1406          Pain Scale: FACES Pre/Post-Treatment    Pain: FACES Scale, Pretreatment  2-->hurts little bit  -JJ     Posttreatment Pain Rating  2-->hurts little bit  -JJ     Pain Location - Side  Left  -JJ     Pain Location - Orientation  posterior  -JJ     Pain Location  back  -JJ     Row Name 08/18/21 140          Plan of Care Review    Plan of Care Reviewed With  patient  -JJ     Outcome Summary  OT eval  completed. Pt is alert and oriented x4, seated in chair upon entry to room. Pt reports that at baseline she is independent with all mobility and ADLs. Today, she presents with decreased strength, balance, activity tolerance, sensation and increased pain. Pt incontinent of bowel during session required Max A for perineal hygiene, clothing mgt. Min A for donning/doffing gown. Max A for donning/doffing socks. Min A for sit <> stand t/f from chair, complete x4 reps and for small steps in room with rwx. Pt would benefit from skilled OT services to address these deficits. Pt is at a high risk for falls at this time and she reports that she has had four falls since d/c from SNF earlier this month. Recommend d/c back to SNF for continued therapy services.  -     Row Name 08/18/21 1406          Therapy Assessment/Plan (OT)    Patient/Family Therapy Goal Statement (OT)  return home  -     Rehab Potential (OT)  good, to achieve stated therapy goals  -     Criteria for Skilled Therapeutic Interventions Met (OT)  yes;skilled treatment is necessary  -     Therapy Frequency (OT)  5 times/wk  -     Predicted Duration of Therapy Intervention (OT)  10  -J     Row Name 08/18/21 1406          Therapy Plan Review/Discharge Plan (OT)    Anticipated Discharge Disposition (OT)  [facility] nursing facility  -     Row Name 08/18/21 1406          Vital Signs    Pre Patient Position  Sitting  -JJ     Intra Patient Position  Standing  -JJ     Post Patient Position  Sitting  -J     Row Name 08/18/21 1406          Positioning and Restraints    Pre-Treatment Position  sitting in chair/recliner  -JJ     Post Treatment Position  chair  -JJ     In Chair  reclined;call light within reach;encouraged to call for assist;with family/caregiver  -       User Key  (r) = Recorded By, (t) = Taken By, (c) = Cosigned By    Initials Name Provider Type    Desire Villa OTR/L Occupational Therapist        Outcome Measures     Row Name 08/18/21  1406          How much help from another is currently needed...    Putting on and taking off regular lower body clothing?  2  -JJ     Bathing (including washing, rinsing, and drying)  2  -JJ     Toileting (which includes using toilet bed pan or urinal)  2  -JJ     Putting on and taking off regular upper body clothing  3  -JJ     Taking care of personal grooming (such as brushing teeth)  4  -JJ     Eating meals  4  -JJ     AM-PAC 6 Clicks Score (OT)  17  -JJ     Row Name 08/18/21 1405          How much help from another person do you currently need...    Turning from your back to your side while in flat bed without using bedrails?  3  -BACILIO (r) PH (t) BACILIO (c)     Moving from lying on back to sitting on the side of a flat bed without bedrails?  3  -BACILIO (r) PH (t) BACILIO (c)     Moving to and from a bed to a chair (including a wheelchair)?  3  -BACILIO (r) PH (t) BACILIO (c)     Standing up from a chair using your arms (e.g., wheelchair, bedside chair)?  3  -BACILIO (r) PH (t) BACILIO (c)     Climbing 3-5 steps with a railing?  2  -BACILIO (r) PH (t) BACILIO (c)     To walk in hospital room?  3  -BACILIO (r) PH (t) BACILIO (c)     AM-PAC 6 Clicks Score (PT)  17  -BACILIO (r) PH (t)     Row Name 08/18/21 1406 08/18/21 1405       Functional Assessment    Outcome Measure Options  AM-PAC 6 Clicks Daily Activity (OT)  -  AM-PAC 6 Clicks Basic Mobility (PT)  -BACILIO (r) PH (t) BACILIO (c)      User Key  (r) = Recorded By, (t) = Taken By, (c) = Cosigned By    Initials Name Provider Type    Ran Ponce, PT DPT Physical Therapist    Desire Villa, OTR/L Occupational Therapist    Elinor Taylor, PT Student PT Student          Occupational Therapy Education                 Title: PT OT SLP Therapies (In Progress)     Topic: Occupational Therapy (In Progress)     Point: ADL training (Done)     Description:   Instruct learner(s) on proper safety adaptation and remediation techniques during self care or transfers.   Instruct in proper use of assistive devices.               Learning Progress Summary           Patient Acceptance, E, VU by SAM at 8/18/2021 1544                   Point: Home exercise program (Not Started)     Description:   Instruct learner(s) on appropriate technique for monitoring, assisting and/or progressing therapeutic exercises/activities.              Learner Progress:  Not documented in this visit.          Point: Precautions (Done)     Description:   Instruct learner(s) on prescribed precautions during self-care and functional transfers.              Learning Progress Summary           Patient Acceptance, E, VU by SAM at 8/18/2021 1544                   Point: Body mechanics (Not Started)     Description:   Instruct learner(s) on proper positioning and spine alignment during self-care, functional mobility activities and/or exercises.              Learner Progress:  Not documented in this visit.                      User Key     Initials Effective Dates Name Provider Type Discipline    SAM 06/16/21 -  Desire Keita OTR/L Occupational Therapist OT              OT Recommendation and Plan  Planned Therapy Interventions (OT): activity tolerance training, adaptive equipment training, BADL retraining, functional balance retraining, occupation/activity based interventions, patient/caregiver education/training, transfer/mobility retraining, strengthening exercise  Therapy Frequency (OT): 5 times/wk  Plan of Care Review  Plan of Care Reviewed With: patient  Outcome Summary: OT eval completed. Pt is alert and oriented x4, seated in chair upon entry to room. Pt reports that at baseline she is independent with all mobility and ADLs. Today, she presents with decreased strength, balance, activity tolerance, sensation and increased pain. Pt incontinent of bowel during session required Max A for perineal hygiene, clothing mgt. Min A for donning/doffing gown. Max A for donning/doffing socks. Min A for sit <> stand t/f from chair, complete x4 reps and for small steps in room  with rwx. Pt would benefit from skilled OT services to address these deficits. Pt is at a high risk for falls at this time and she reports that she has had four falls since d/c from SNF earlier this month. Recommend d/c back to SNF for continued therapy services.     Time Calculation:   Time Calculation- OT     Row Name 08/18/21 1545             Time Calculation- OT    OT Start Time  1405  -JJ      OT Stop Time  1449  -J      OT Time Calculation (min)  44 min  -      OT Received On  08/18/21  -      OT Goal Re-Cert Due Date  08/28/21  -        User Key  (r) = Recorded By, (t) = Taken By, (c) = Cosigned By    Initials Name Provider Type    Desire Villa OTR/L Occupational Therapist        Therapy Charges for Today     Code Description Service Date Service Provider Modifiers Qty    [id number] HC OT EVAL LOW COMPLEXITY 3 8/18/2021 Desire Keita OTR/L GO 1               DIANE Summers/RAMSEY  8/18/2021

## 2021-08-18 NOTE — PLAN OF CARE
Goal Outcome Evaluation:  Plan of Care Reviewed With: patient           Outcome Summary: This pt was admitted on 8/17 for weakness and hyperglycemia. The patient was found to have extremely elevated glucose levels into the [place]. The patient suffers from ESRD in which she receives dialysis for. The pt was found to have cardiomegaly with intersititual infiltrates. Pt suffers from gastroparesis in which she states that she is unable to control bowel or bladder movements. Pt states that her doctors are aware of this at this time. The patient is scheduled to have a microdiscectomy in this facility on 8/30/21 in which she states she still plans to have the surgery. The pt presents with 4/5 strength on the R LE and 3-/5 strength on the L LE. Pt also has decerased ROM on the L due to weakness/back pain. The patient has absent sensation on the B LE to light touch and deep pressure. Pt states that she lived in a SNF after discharging from this facility earlier in ther year, but she soon moved back home where she lived independently. Pt states that she utilized a walker at home to perform mobility and that she was independent in all ADLs. PT will continue to treat the patient's deficits in order to increase functional mobility/safety awareness. PT recommends a SNF to be the patients discharge destination at this time.

## 2021-08-18 NOTE — PROGRESS NOTES
[facility] Medicine Services  INPATIENT PROGRESS NOTE    Length of Stay: 1  Date of Admission: 8/17/2021  Primary Care Physician: Norm Wiggins APRN    Subjective   Chief Complaint: Weakness.    HPI   Hyperkalemia resolved.  Ongoing dialysis today 2.  Sugar stable.  Patient having chest pain.  Patient no sign of acute distress.  Patient is on room air.    Review of Systems   Constitutional: Positive for activity change, appetite change and fatigue. Negative for chills and fever.   HENT: Negative for hearing loss, nosebleeds, tinnitus and trouble swallowing.    Eyes: Negative for visual disturbance.   Respiratory: Negative for cough, chest tightness, shortness of breath and wheezing.    Cardiovascular: Negative for chest pain, palpitations and leg swelling.   Gastrointestinal: Negative for abdominal distention, abdominal pain, blood in stool, constipation, diarrhea, nausea and vomiting.   Endocrine: Negative for cold intolerance, heat intolerance, polydipsia, polyphagia and polyuria.   Genitourinary: Negative for decreased urine volume, difficulty urinating, dysuria, flank pain, frequency and hematuria.   Musculoskeletal: Positive for arthralgias, gait problem and myalgias. Negative for joint swelling.   Skin: Negative for rash.   Allergic/Immunologic: Negative for immunocompromised state.   Neurological: Positive for weakness. Negative for dizziness, syncope, light-headedness and headaches.   Hematological: Negative for adenopathy. Does not bruise/bleed easily.   Psychiatric/Behavioral: Negative for confusion and sleep disturbance. The patient is not nervous/anxious.           All pertinent negatives and positives are as above. All other systems have been reviewed and are negative unless otherwise stated.     Objective    Temp:  [97.4 °F (36.3 °C)-98.4 °F (36.9 °C)] 98.1 °F (36.7 °C)  Heart Rate:  [58-77] 68  Resp:  [16-18] 16  BP: (112-177)/(58-95) 148/76    Intake/Output  Summary (Last 24 hours) at 8/18/2021 0826  Last data filed at 8/18/2021 0000  Gross per 24 hour   Intake 1165.47 ml   Output --   Net 1165.47 ml     Physical Exam  Vitals and nursing note reviewed.   Constitutional:       Appearance: She is well-developed.   HENT:      Head: Normocephalic.   Eyes:      Conjunctiva/sclera: Conjunctivae normal.      Pupils: Pupils are equal, round, and reactive to light.   Neck:      Vascular: No JVD.   Cardiovascular:      Rate and Rhythm: Normal rate and regular rhythm.      Heart sounds: Normal heart sounds. No murmur heard.   No friction rub. No gallop.    Pulmonary:      Effort: No respiratory distress.      Breath sounds: No wheezing or rales.      Comments: Diminished breath sound, clear .  Chest:      Chest wall: No tenderness.   Abdominal:      General: Bowel sounds are normal. There is no distension.      Palpations: Abdomen is soft.      Tenderness: There is no abdominal tenderness. There is no guarding or rebound.   Musculoskeletal:         General: No tenderness or deformity. Normal range of motion.      Cervical back: Neck supple.   Skin:     General: Skin is warm and dry.      Capillary Refill: Capillary refill takes 2 to 3 seconds.      Findings: No rash.   Neurological:      General: No focal deficit present.      Mental Status: She is alert and oriented to person, place, and time.      Cranial Nerves: No cranial nerve deficit.      Motor: Weakness present. No abnormal muscle tone.      Coordination: Coordination abnormal.      Gait: Gait abnormal.      Deep Tendon Reflexes: Reflexes normal.   Psychiatric:         Behavior: Behavior normal.         Thought Content: Thought content normal.         Results Review:  Lab Results (last 24 hours)     Procedure Component Value Units Date/Time    POC Glucose Once [[id number]]  (Normal) Collected: 08/18/21 0621    Specimen: Blood Updated: 08/18/21 0632     Glucose 105 mg/dL      Comment: : [id number] Benita CranstonMeter ID:  [id number]       Hemoglobin A1c [[id number]]  (Abnormal) Collected: 08/18/21 0032    Specimen: Blood Updated: 08/18/21 0532     Hemoglobin A1C 10.60 %     Narrative:      Hemoglobin A1C Ranges:    Increased Risk for Diabetes  5.7% to 6.4%  Diabetes                     >= 6.5%  Diabetic Goal                < 7.0%    POC Glucose Once [[id number]]  (Normal) Collected: 08/18/21 0459    Specimen: Blood Updated: 08/18/21 0511     Glucose 79 mg/dL      Comment: : [id number] Benita CheyipaiMeter ID: [id number]       Comprehensive Metabolic Panel [[id number]]  (Abnormal) Collected: 08/18/21 0246    Specimen: Blood Updated: 08/18/21 0406     Glucose 67 mg/dL      BUN 42 mg/dL      Creatinine 3.47 mg/dL      Sodium 133 mmol/L      Potassium 4.3 mmol/L      Chloride 96 mmol/L      CO2 27.0 mmol/L      Calcium 9.2 mg/dL      Total Protein 6.6 g/dL      Albumin 3.60 g/dL      ALT (SGPT) 11 U/L      AST (SGOT) 21 U/L      Alkaline Phosphatase 102 U/L      Total Bilirubin 0.4 mg/dL      eGFR Non African Amer 14 mL/min/1.73      Comment: <15 Indicative of kidney failure.        eGFR   Amer --     Comment: <15 Indicative of kidney failure.        Globulin 3.0 gm/dL      A/G Ratio 1.2 g/dL      BUN/Creatinine Ratio 12.1     Anion Gap 10.0 mmol/L     Narrative:      GFR Normal >60  Chronic Kidney Disease <60  Kidney Failure <15      Phosphorus [[id number]]  (Normal) Collected: 08/18/21 0246    Specimen: Blood Updated: 08/18/21 0406     Phosphorus 3.8 mg/dL     POC Glucose Once [[id number]]  (Normal) Collected: 08/18/21 0355    Specimen: Blood Updated: 08/18/21 0405     Glucose 96 mg/dL      Comment: : [id number] Benita Recipharm ID: [id number]       Magnesium [[id number]]  (Normal) Collected: 08/18/21 0246    Specimen: Blood Updated: 08/18/21 0327     Magnesium 2.3 mg/dL     POC Glucose Once [[id number]]  (Abnormal) Collected: 08/18/21 0257    Specimen: Blood Updated: 08/18/21 0308     Glucose 59 mg/dL      Comment: :  [id number] Benita FrostMeter ID: [id number]       POC Glucose Once [[id number]]  (Normal) Collected: 08/18/21 0200    Specimen: Blood Updated: 08/18/21 0211     Glucose 104 mg/dL      Comment: : [id number] Benita FrostMeter ID: [id number]       TSH [[id number]]  (Abnormal) Collected: 08/18/21 0032    Specimen: Blood Updated: 08/18/21 0111     TSH 20.330 uIU/mL     POC Glucose Once [[id number]]  (Normal) Collected: 08/18/21 0059    Specimen: Blood Updated: 08/18/21 0110     Glucose 87 mg/dL      Comment: : [id number] Benita FrostMeter ID: [id number]       Lipid Panel [[id number]] Collected: 08/18/21 0032    Specimen: Blood Updated: 08/18/21 0108     Total Cholesterol 157 mg/dL      Triglycerides 60 mg/dL      HDL Cholesterol 59 mg/dL      LDL Cholesterol  86 mg/dL      VLDL Cholesterol 12 mg/dL      LDL/HDL Ratio 1.46    Narrative:      Cholesterol Reference Ranges  (U.S. Department of Health and Human Services ATP III Classifications)    Desirable          <200 mg/dL  Borderline High    200-239 mg/dL  High Risk          >240 mg/dL      Triglyceride Reference Ranges  (U.S. Department of Health and Human Services ATP III Classifications)    Normal           <150 mg/dL  Borderline High  150-199 mg/dL  High             200-499 mg/dL  Very High        >500 mg/dL    HDL Reference Ranges  (U.S. Department of Health and Human Services ATP III Classifcations)    Low     <40 mg/dl (major risk factor for CHD)  High    >60 mg/dl ('negative' risk factor for CHD)        LDL Reference Ranges  (U.S. Department of Health and Human Services ATP III Classifcations)    Optimal          <100 mg/dL  Near Optimal     100-129 mg/dL  Borderline High  130-159 mg/dL  High             160-189 mg/dL  Very High        >189 mg/dL    Magnesium [[id number]]  (Normal) Collected: 08/18/21 0032    Specimen: Blood Updated: 08/18/21 0100     Magnesium 2.3 mg/dL     CBC Auto Differential [[id number]]  (Abnormal) Collected: 08/18/21 0032    Specimen:  Blood Updated: 08/18/21 0044     WBC 5.37 10*3/mm3      RBC 2.78 10*6/mm3      Hemoglobin 8.0 g/dL      Hematocrit 23.5 %      MCV 84.5 fL      MCH 28.8 pg      MCHC 34.0 g/dL      RDW 13.9 %      RDW-SD 43.1 fl      MPV 10.0 fL      Platelets 128 10*3/mm3      Neutrophil % 75.3 %      Lymphocyte % 14.3 %      Monocyte % 6.5 %      Eosinophil % 2.6 %      Basophil % 0.9 %      Immature Grans % 0.4 %      Neutrophils, Absolute 4.04 10*3/mm3      Lymphocytes, Absolute 0.77 10*3/mm3      Monocytes, Absolute 0.35 10*3/mm3      Eosinophils, Absolute 0.14 10*3/mm3      Basophils, Absolute 0.05 10*3/mm3      Immature Grans, Absolute 0.02 10*3/mm3      nRBC 0.0 /100 WBC     POC Glucose Once [[id number]]  (Abnormal) Collected: 08/17/21 2355    Specimen: Blood Updated: 08/18/21 0006     Glucose 157 mg/dL      Comment: : [id number] Benita FrostMeter ID: [id number]       POC Glucose Once [[id number]]  (Abnormal) Collected: 08/17/21 2253    Specimen: Blood Updated: 08/17/21 2304     Glucose 218 mg/dL      Comment: : [id number] Benita BluetectorMeter ID: [id number]       POC Glucose Once [[id number]]  (Abnormal) Collected: 08/17/21 2148    Specimen: Blood Updated: 08/17/21 2159     Glucose 302 mg/dL      Comment: : [id number] Benita BluetectorMeter ID: [id number]       POC Glucose Once [[id number]]  (Abnormal) Collected: 08/17/21 2055    Specimen: Blood Updated: 08/17/21 2106     Glucose 342 mg/dL      Comment: : [id number] Benita BluetectorMeter ID: [id number]       Glucose, Random [[id number]]  (Abnormal) Collected: 08/17/21 2024    Specimen: Blood Updated: 08/17/21 2051     Glucose 373 mg/dL     Basic Metabolic Panel [[id number]]  (Abnormal) Collected: 08/17/21 1743    Specimen: Blood Updated: 08/17/21 1840     Glucose 911 mg/dL      BUN 76 mg/dL      Creatinine 4.68 mg/dL      Sodium 117 mmol/L      Potassium 5.6 mmol/L      Chloride 79 mmol/L      CO2 21.0 mmol/L      Calcium 8.7 mg/dL      eGFR   Amdemetrius --     Comment:  <15 Indicative of kidney failure.        eGFR Non African Amer 10 mL/min/1.73      Comment: <15 Indicative of kidney failure.        BUN/Creatinine Ratio 16.2     Anion Gap 17.0 mmol/L     Narrative:      GFR Normal >60  Chronic Kidney Disease <60  Kidney Failure <15      Troponin [[id number]]  (Abnormal) Collected: 08/17/21 1651    Specimen: Blood Updated: 08/17/21 1724     Troponin T 0.173 ng/mL     Narrative:      Troponin T Reference Range:  <= 0.03 ng/mL-   Negative for AMI  >0.03 ng/mL-     Abnormal for myocardial necrosis.  Clinicians would have to utilize clinical acumen, EKG, Troponin and serial changes to determine if it is an Acute Myocardial Infarction or myocardial injury due to an underlying chronic condition.       Results may be falsely decreased if patient taking Biotin.      COVID-19,[facility] IN-HOUSE,Nasal Swab No Transport Media 3-4 HR TAT - Swab, Nasal Cavity [[id number]]  (Normal) Collected: 08/17/21 1548    Specimen: Swab from Nasal Cavity Updated: 08/17/21 1642     COVID19 Not Detected    Narrative:      Fact sheet for providers: https://www.fda.gov/media/924289/download     Fact sheet for patients: https://www.fda.gov/media/698768/download    Test performed by PCR.    Consider negative results in combination with clinical observations, patient history, and epidemiological information.    Blood Gas, Arterial - [[id number]]  (Abnormal) Collected: 08/17/21 1550    Specimen: Arterial Blood Updated: 08/17/21 1559     Site Right Radial     Aaron's Test N/A     pH, Arterial 7.370 pH units      pCO2, Arterial 36.8 mm Hg      pO2, Arterial 61.6 mm Hg      Comment: 84 Value below reference range        HCO3, Arterial 21.2 mmol/L      Base Excess, Arterial -3.7 mmol/L      Comment: 84 Value below reference range        O2 Saturation, Arterial 89.8 %      Comment: 84 Value below reference range        Temperature 37.0 C      Barometric Pressure for Blood Gas 748 mmHg      Modality Room Air     Ventilator  Mode NA     Collected by [id number]     Comment: Meter: [id number]     :  [id number]        pCO2, Temperature Corrected 36.8 mm Hg      pH, Temp Corrected 7.370 pH Units      pO2, Temperature Corrected 61.6 mm Hg     Osmolality, Serum [[id number]]  (Abnormal) Collected: 08/17/21 1432    Specimen: Blood Updated: 08/17/21 1555     Osmolality 331 mOsm/kg     Phosphorus [[id number]]  (Abnormal) Collected: 08/17/21 1431    Specimen: Blood Updated: 08/17/21 1554     Phosphorus 5.4 mg/dL     [facility] [[id number]] Collected: 08/17/21 1431    Specimen: Blood Updated: 08/17/21 1545    Narrative:      The following orders were created for panel order [facility].  Procedure                               Abnormality         Status                     ---------                               -----------         ------                     Green Top (Gel)[[id number]]                                  Final result               Lavender Top[[id number]]                                     Final result               Red Top[[id number]]                                          Final result                 Please view results for these tests on the individual orders.    Green Top (Gel) [[id number]] Collected: 08/17/21 1431    Specimen: Blood Updated: 08/17/21 1545     Extra Tube Hold for add-ons.     Comment: Auto resulted.       Red Top [[id number]] Collected: 08/17/21 1432    Specimen: Blood Updated: 08/17/21 1545     Extra Tube Hold for add-ons.     Comment: Auto resulted.       Lavender Top [[id number]] Collected: 08/17/21 1431    Specimen: Blood Updated: 08/17/21 1545     Extra Tube hold for add-on     Comment: Auto resulted       Comprehensive Metabolic Panel [[id number]]  (Abnormal) Collected: 08/17/21 1431    Specimen: Blood Updated: 08/17/21 1522     Glucose 1,126 mg/dL      BUN 86 mg/dL      Creatinine 5.22 mg/dL      Sodium 110 mmol/L      Potassium 7.3 mmol/L      Chloride 72 mmol/L      CO2 18.0 mmol/L      Calcium 8.9  mg/dL      Total Protein 7.2 g/dL      Albumin 4.10 g/dL      ALT (SGPT) 8 U/L      AST (SGOT) 12 U/L      Alkaline Phosphatase 116 U/L      Total Bilirubin 0.6 mg/dL      eGFR Non African Amer 9 mL/min/1.73      Comment: <15 Indicative of kidney failure.        eGFR   Amer --     Comment: <15 Indicative of kidney failure.        Globulin 3.1 gm/dL      A/G Ratio 1.3 g/dL      BUN/Creatinine Ratio 16.5     Anion Gap 20.0 mmol/L     Narrative:      GFR Normal >60  Chronic Kidney Disease <60  Kidney Failure <15      Troponin [[id number]]  (Abnormal) Collected: 08/17/21 1431    Specimen: Blood Updated: 08/17/21 1509     Troponin T 0.186 ng/mL     Narrative:      Troponin T Reference Range:  <= 0.03 ng/mL-   Negative for AMI  >0.03 ng/mL-     Abnormal for myocardial necrosis.  Clinicians would have to utilize clinical acumen, EKG, Troponin and serial changes to determine if it is an Acute Myocardial Infarction or myocardial injury due to an underlying chronic condition.       Results may be falsely decreased if patient taking Biotin.      Magnesium [[id number]]  (Normal) Collected: 08/17/21 1431    Specimen: Blood Updated: 08/17/21 1502     Magnesium 2.6 mg/dL     CBC & Differential [[id number]]  (Abnormal) Collected: 08/17/21 1431    Specimen: Blood Updated: 08/17/21 8875    Narrative:      The following orders were created for panel order CBC & Differential.  Procedure                               Abnormality         Status                     ---------                               -----------         ------                     CBC Auto Differential[[id number]]        Abnormal            Final result                 Please view results for these tests on the individual orders.    CBC Auto Differential [[id number]]  (Abnormal) Collected: 08/17/21 1431    Specimen: Blood Updated: 08/17/21 1455     WBC 6.75 10*3/mm3      RBC 2.99 10*6/mm3      Hemoglobin 8.8 g/dL      Hematocrit 25.8 %      MCV 86.3 fL      MCH 29.4  pg      MCHC 34.1 g/dL      RDW 14.3 %      RDW-SD 45.0 fl      MPV 10.8 fL      Platelets 137 10*3/mm3      Neutrophil % 79.0 %      Lymphocyte % 11.0 %      Monocyte % 8.0 %      Eosinophil % 0.4 %      Basophil % 1.3 %      Immature Grans % 0.3 %      Neutrophils, Absolute 5.33 10*3/mm3      Lymphocytes, Absolute 0.74 10*3/mm3      Monocytes, Absolute 0.54 10*3/mm3      Eosinophils, Absolute 0.03 10*3/mm3      Basophils, Absolute 0.09 10*3/mm3      Immature Grans, Absolute 0.02 10*3/mm3      nRBC 0.0 /100 WBC            Cultures:  No results found for: BLOODCX, URINECX, WOUNDCX, MRSACX, RESPCX, STOOLCX    Radiology Data:    Imaging Results (Last 24 Hours)     Procedure Component Value Units Date/Time    XR Chest 1 View [[id number]] Collected: 08/17/21 1608     Updated: 08/17/21 1615    Narrative:      EXAM: XR CHEST 1 VW-     HISTORY: weakness; R73.9-Hyperglycemia, unspecified; E87.5-Hyperkalemia;  E87.7-Vffv-dsbsukhzfa and hyponatremia; R53.1-Weakness       COMPARISON: June 19, 2021.     TECHNIQUE: Frontal radiograph of the chest     FINDINGS:   Bilateral interstitial infiltrates are present. This has appearance of  pulmonary vascular congestion.. Cardiac silhouette is mildly enlarged.  Left subclavian endovascular stent graft is present..      The osseous structures and surrounding soft tissues demonstrate no acute  abnormality.          Impression:      1. Cardiomegaly with bilateral interstitial infiltrates most likely  representing mild or early changes of pulmonary vascular congestion.        This report was finalized on 08/17/2021 16:11 by Dr. Lazaro Martinez MD.          Allergies   Allergen Reactions   • Lamisil [Terbinafine]    • Penicillins    • Reglan [Metoclopramide] GI Intolerance   • Stadol [Butorphanol] Nausea And Vomiting       Scheduled meds:   atorvastatin, 10 mg, Oral, Daily  carvedilol, 12.5 mg, Oral, BID With Meals  cloNIDine, 0.2 mg, Oral, Q8H  DULoxetine, 30 mg, Oral, Daily  famotidine, 20  mg, Oral, BID  gabapentin, 300 mg, Oral, Nightly  hydrALAZINE, 50 mg, Oral, TID  insulin detemir, 5 Units, Subcutaneous, Daily  insulin lispro, 2-7 Units, Subcutaneous, 4x Daily With Meals & Nightly  lactulose, 20 g, Oral, TID  nicotine, 1 patch, Transdermal, Q24H  NIFEdipine XL, 90 mg, Oral, Q24H  rOPINIRole, 4 mg, Oral, Nightly  senna, 1 tablet, Oral, Nightly  sodium chloride, 10 mL, Intravenous, Q12H        PRN meds:  •  albuterol  •  dextrose  •  dextrose  •  dextrose  •  dextrose  •  dextrose  •  dextrose 5 % and sodium chloride 0.45 %  •  dextrose 5 % and sodium chloride 0.45 % with KCl 20 mEq/L  •  dextrose 5 % and sodium chloride 0.45 % with KCl 40 mEq/L  •  dextrose 5 % and sodium chloride 0.9 %  •  dextrose 5 % and sodium chloride 0.9 % with KCl 20 mEq  •  dextrose 5% and sodium chloride 0.9% with KCl 40 mEq/L  •  glucagon (human recombinant)  •  glucagon (human recombinant)  •  ondansetron  •  sodium chloride  •  sodium chloride 0.45 % with KCl 20 mEq  •  [COMPLETED] Insert peripheral IV **AND** sodium chloride  •  sodium chloride  •  sodium chloride 0.9 % with KCl 20 mEq  •  sodium chloride 0.9 % with KCl 40 mEq/L    Assessment/Plan       Intractable nausea and vomiting    Gastroparesis due to DM (CMS/[facility])    Hyperglycemia    End stage kidney disease (CMS/[facility])    Uncontrolled diabetes mellitus (CMS/[facility])    Hyperkalemia      Plan:  DM/Hyperglycemia.  Sugar stable.  Hemoglobin A1c 10.6.  Sliding scale.  Levemir.     Chronic renal failure on dialysis.    Nephrology consult.  Dialysis per nephrology.     Hyperkalemia.    Resolved.     Nausea/vomiting.  Pepcid.  Zofran.     Hypertension/hyperlipidemia.  Coreg.  Catapres.  Hydralazine.  Hold lisinopril due to hyperkalemia.  Procardia.  Zocor.    COPD.  Albuterol nebulizer as needed.  Chest x-ray-Cardiomegaly with bilateral interstitial infiltrates most likely representing mild or early changes of pulmonary vascular congestion.    Anemia.  Slight decrease in  hemoglobin.  No sign of acute bleed.    TSH elevated.  Free T4 pending.    Anxiety/depression.  Cymbalta.     Neuropathy.  Neurontin.  Requip at night.     Chronic smoker.  Nicotine patch.  Discussed patient cutting back and stopping.     Constipation . Lactulose.  Senokot.     Hypothyroidism.  Synthroid.    Deconditioning.  PT and OT consult.    Covid-19-negative.     Discharge Plannin-4 days    Electronically signed by Narinder Madrid MD, 21, 8:26 AM CDT.

## 2021-08-18 NOTE — THERAPY EVALUATION
Patient Name: Ena Upton  : 1970    MRN: [id number]                              Today's Date: 2021       Admit Date: 2021    Visit Dx:     ICD-10-CM ICD-9-CM   1. Hyperglycemia  R73.9 790.29   2. Hyperkalemia  E87.5 276.7   3. Hyponatremia  E87.1 276.1   4. Generalized weakness  R53.1 780.79     Patient Active Problem List   Diagnosis   • CKD stage 4 due to type 2 diabetes mellitus (CMS/HCC)   • Non-ketotic hyperosmolar coma (CMS/HCC)   • HTN (hypertension)   • Intractable nausea and vomiting   • Constipation   • Gastroparesis due to DM (CMS/HCC)   • Hyperglycemia   • Seizure (CMS/HCC)   • End stage kidney disease (CMS/HCC)   • Sacral insufficiency fracture   • Hypertensive urgency   • Uncontrolled diabetes mellitus (CMS/HCC)   • Hyperkalemia     Past Medical History:   Diagnosis Date   • Anxiety    • Arthritis    • Chronic kidney disease     STAGE V RENAL FAILURE   • Depression    • Diabetes mellitus (CMS/HCC)    • HTN (hypertension)    • Hypothyroidism    • Obesity    • Tremors of nervous system      Past Surgical History:   Procedure Laterality Date   • ARTERIOVENOUS FISTULA     • BREAST BIOPSY     • CARPAL TUNNEL RELEASE     • CHOLECYSTECTOMY     • TUBAL ABDOMINAL LIGATION       General Information     Row Name 21 2886          Physical Therapy Time and Intention    Document Type  evaluation Hyperglycemic/weakness, Cardiomegaly with B intertistial infiltratres, Gastroparesis, ESRD with dialysis, Pulmonary/vascular congestion   -BACILIO (r) PH (t) BACILIO (c)     Mode of Treatment  physical therapy  -BACILIO (r) PH (t) BACILIO (c)     Row Name 21 5503          General Information    Patient Profile Reviewed  yes  -BACILIO (r) PH (t) BACILIO (c)     Prior Level of Function  independent:;gait;transfer;bed mobility;ADL's  -BACILIO (r) PH (t) BACILIO (c)     Barriers to Rehab  medically complex;environmental barriers Pt states she lives alone which is a barrier to her health  -BACILIO (r) PH (t) BACILIO (c)     Row Name 21  1405          Home Main Entrance    Number of Stairs, Main Entrance  four  -BACILIO (r) PH (t) BACILIO (c)     Stair Railings, Main Entrance  railings on both sides of stairs  -BACILIO (r) PH (t) BACILIO (c)     Row Name 08/18/21 1405          Stairs Within Home, Primary    Stairs, Within Home, Primary  No stairs inside the home, Pt has a shower tub that she states is an issue. pt desires a shower chair.  -BACILIO (r) PH (t) BACILIO (c)     Row Name 08/18/21 1405          Cognition    Orientation Status (Cognition)  oriented x 4  -BACILIO (r) PH (t) BACILIO (c)     Row Name 08/18/21 1405          Safety Issues, Functional Mobility    Safety Issues Affecting Function (Mobility)  safety precaution awareness;safety precautions follow-through/compliance  -BACILIO (r) PH (t) BACILIO (c)     Impairments Affecting Function (Mobility)  balance;cognition;pain;range of motion (ROM);sensation/sensory awareness;shortness of breath;strength  -BACILIO (r) PH (t) BACILIO (c)     Cognitive Impairments, Mobility Safety/Performance  awareness, need for assistance  -BACILIO (r) PH (t) BACILIO (c)       User Key  (r) = Recorded By, (t) = Taken By, (c) = Cosigned By    Initials Name Provider Type    Ran Ponce, PT DPT Physical Therapist    Elinor Taylor, PT Student PT Student        Mobility     Row Name 08/18/21 1400          Bed Mobility    Comment (Bed Mobility)  Pt seen in chair when PT entered  -BACILIO (r) PH (t) BACILIO (c)     Row Name 08/18/21 1408          Sit-Stand Transfer    Sit-Stand [place] (Transfers)  1 person assist;minimum assist (75% patient effort)  -BACILIO (r) PH (t) BACILIO (c)     Assistive Device (Sit-Stand Transfers)  walker, front-wheeled  -BACILIO (r) PH (t) BACILIO (c)     Row Name 08/18/21 1403          Gait/Stairs (Locomotion)    [place] Level (Gait)  contact guard;1 person assist  -BACILIO (r) PH (t) BACILIO (c)     Assistive Device (Gait)  walker, 4-wheeled  -BACILIO (r) PH (t) BACILIO (c)     Distance in Feet (Gait)  15'  -BACILIO (r) PH (t) BACILIO (c)     Deviations/Abnormal Patterns (Gait)  flavio  decreased;gait speed decreased;festinating/shuffling  -BACILIO (r) PH (t) BACILIO (c)     Comment (Gait/Stairs)  Pt has absent sensation in the B LE which could inhibit gait  -BACILIO (r) PH (t) BACILIO (c)       User Key  (r) = Recorded By, (t) = Taken By, (c) = Cosigned By    Initials Name Provider Type    Ran Ponce PT DPT Physical Therapist    Elinor Taylor, PT Student PT Student        Obj/Interventions     Row Name 08/18/21 1405          Range of Motion Comprehensive    Comment, General Range of Motion  WFL on the R LE, pt completes 75% of the ROM on the L LE  -BACILIO (r) PH (t) BACILIO (c)     Row Name 08/18/21 1405          Strength Comprehensive (MMT)    Comment, General Manual Muscle Testing (MMT) Assessment  R LE 4/5; L LE 3-/5  -BACILIO (r) PH (t) BACILIO (c)     Row Name 08/18/21 1405          Balance    Balance Assessment  sitting static balance;standing static balance  -BACILIO (r) PH (t) BACILIO (c)     Static Sitting Balance  WFL;sitting in chair  -BACILIO (r) PH (t) BACILIO (c)     Static Standing Balance  mild impairment;standing;supported  -BACILIO (r) PH (t) BACILIO (c)     Comment, Balance  The patient has a strength deficit on the L LE that inhibits her abilty to maintain trunk control during standing.  -BACILIO (r) PH (t) BACILIO (c)     Row Name 08/18/21 1405          Sensory Assessment (Somatosensory)    Sensory Assessment (Somatosensory)  other (see comments) The patient has absent sensation to light touch and deep pressure below the waist.  -BACILIO (r) PH (t) BACILIO (c)       User Key  (r) = Recorded By, (t) = Taken By, (c) = Cosigned By    Initials Name Provider Type    Ran Ponce PT DPT Physical Therapist    Elinor Taylor, PT Student PT Student        Goals/Plan     Row Name 08/18/21 1401          Bed Mobility Goal 1 (PT)    Activity/Assistive Device (Bed Mobility Goal 1, PT)  sit to supine/supine to sit  -BACILIO (r) PH (t) BACILIO (c)     [place] Level/Cues Needed (Bed Mobility Goal 1, PT)  independent  -BACILIO (r) PH (t) BACILIO (c)     Time Frame (Bed  Mobility Goal 1, PT)  long term goal (LTG);10 days  -BACILIO (r) PH (t) BACILIO (c)     Progress/Outcomes (Bed Mobility Goal 1, PT)  goal ongoing  -BACILIO (r) PH (t) BACILIO (c)     Row Name 08/18/21 1405          Transfer Goal 1 (PT)    Activity/Assistive Device (Transfer Goal 1, PT)  sit-to-stand/stand-to-sit;bed-to-chair/chair-to-bed  -BACILIO (r) PH (t) BACILIO (c)     [place] Level/Cues Needed (Transfer Goal 1, PT)  supervision required  -BACILIO (r) PH (t) BACILIO (c)     Time Frame (Transfer Goal 1, PT)  long term goal (LTG);10 days  -BACILIO (r) PH (t) BACILIO (c)     Progress/Outcome (Transfer Goal 1, PT)  goal ongoing  -BACILIO (r) PH (t) BACILIO (c)     Row Name 08/18/21 1406          Gait Training Goal 1 (PT)    Activity/Assistive Device (Gait Training Goal 1, PT)  gait (walking locomotion);decrease fall risk  -BACILIO (r) PH (t) BACILIO (c)     [place] Level (Gait Training Goal 1, PT)  supervision required;1 person assist  -BACILIO (r) PH (t) BACILIO (c)     Distance (Gait Training Goal 1, PT)  100  -BACILIO (r) PH (t) BACILIO (c)     Time Frame (Gait Training Goal 1, PT)  long term goal (LTG);10 days  -BACILIO (r) PH (t) BACILIO (c)     Strategies/Barriers (Gait Training Goal 1, PT)  Pt has weakness that inhibits her activity tolerance.  -BACILIO (r) PH (t) BACILIO (c)     Progress/Outcome (Gait Training Goal 1, PT)  goal ongoing  -BACILIO (r) PH (t) BACILIO (c)     Row Name 08/18/21 140          Stairs Goal 1 (PT)    Activity/Assistive Device (Stairs Goal 1, PT)  stairs, all skills  -BACILIO (r) PH (t) BACILIO (c)     [place] Level/Cues Needed (Stairs Goal 1, PT)  supervision required  -BACILIO (r) PH (t) BACILIO (c)     Number of Stairs (Stairs Goal 1, PT)  5  -BACILIO (r) PH (t) BACILIO (c)     Time Frame (Stairs Goal 1, PT)  long term goal (LTG);10 days  -BACILIO (r) PH (t) BACILIO (c)     Strategies/Barriers (Stairs Goal 1, PT)  Pt presents with weakness that inhibits her ability to perform stairs at this time.  -BACILIO (r) PH (t) BACILIO (c)     Progress/Outcome (Stairs Goal 1, PT)  goal ongoing  -BACILIO (r) PH (t) BACILIO (c)       User Key  (r) =  Recorded By, (t) = Taken By, (c) = Cosigned By    Initials Name Provider Type    Ran Ponce, PT DPT Physical Therapist    PH Elinor Lovell, PT Student PT Student        Clinical Impression     Row Name 08/18/21 2658          Pain    Additional Documentation  Pain Scale: FACES Pre/Post-Treatment (Group)  -BACILIO (r) PH (t) BACILIO (c)     Row Name 08/18/21 1451          Pain Scale: Numbers Pre/Post-Treatment    Pain Intervention(s)  Ambulation/increased activity;Repositioned  -BACILIO (r) PH (t) BACILIO (c)     Row Name 08/18/21 1455          Pain Scale: FACES Pre/Post-Treatment    Pain: FACES Scale, Pretreatment  2-->hurts little bit  -BACILIO (r) PH (t) BACILIO (c)     Posttreatment Pain Rating  2-->hurts little bit  -BACILIO (r) PH (t) BACILIO (c)     Pain Location - Side  Left  -BACILIO (r) PH (t) BACILIO (c)     Pain Location - Orientation  posterior  -BACILIO (r) PH (t) BACILIO (c)     Pain Location  back  -BACILIO (r) PH (t) BACILIO (c)     Pre/Posttreatment Pain Comment  The patient has increased pain with AROM on the L LE/during gait.  -BACILIO (r) PH (t) BACILIO (c)     Row Name 08/18/21 1452          Plan of Care Review    Plan of Care Reviewed With  patient  -BACILIO (r) PH (t) BACILIO (c)     Outcome Summary  This pt was admitted on 8/17 for weakness and hyperglycemia. The patient was found to have extremely elevated glucose levels into the one thousand sean. The patient suffers from ESRD in which she receives dialysis for. The pt was found to have cardiomegaly with intersititual infiltrates. Pt suffers from gastroparesis in which she states that she is unable to control bowel or bladder movements. Pt states that her doctors are aware of this at this time. The patient is scheduled to have a microdiscectomy in this facility on 8/30/21 in which she states she still plans to have the surgery. The pt presents with 4/5 strength on the R LE and 3-/5 strength on the L LE. Pt also has decerased ROM on the L due to weakness/back pain. The patient has absent sensation on the B LE to light  touch and deep pressure. Pt states that she lived in a SNF after discharging from this facility earlier in ther year, but she soon moved back home where she lived independently. Pt states that she utilized a walker at home to perform mobility and that she was independent in all ADLs. PT will continue to treat the patient's deficits in order to increase functional mobility/safety awareness. PT recommends a SNF to be the patients discharge destination at this time.  -BACILIO (r) PH (t) BACILIO (c)     Row Name 08/18/21 5237          Therapy Assessment/Plan (PT)    Patient/Family Therapy Goals Statement (PT)  To increase independent mobility  -BACILIO (r) PH (t) BACILIO (c)     Rehab Potential (PT)  fair, will monitor progress closely  -BACILIO (r) PH (t) BACILIO (c)     Criteria for Skilled Interventions Met (PT)  skilled treatment is necessary  -BACILIO (r) PH (t) BACILIO (c)     Predicted Duration of Therapy Intervention (PT)  until D.c.  -BACILIO (r) PH (t) BACILIO (c)     Row Name 08/18/21 7871          Positioning and Restraints    Pre-Treatment Position  sitting in chair/recliner  -BACILIO (r) PH (t) BACILIO (c)     Post Treatment Position  chair  -BACILIO (r) PH (t) BACILIO (c)     In Chair  reclined;sitting;call light within reach;encouraged to call for assist  -BACILIO (r) PH (t) BACILIO (c)       User Key  (r) = Recorded By, (t) = Taken By, (c) = Cosigned By    Initials Name Provider Type    Ran Ponce, PT DPT Physical Therapist    PH Elinor Lovell, PT Student PT Student        Outcome Measures     Row Name 08/18/21 2189          How much help from another person do you currently need...    Turning from your back to your side while in flat bed without using bedrails?  3  -BACILIO (r) PH (t) BACILIO (c)     Moving from lying on back to sitting on the side of a flat bed without bedrails?  3  -BACILIO (r) PH (t) BACILIO (c)     Moving to and from a bed to a chair (including a wheelchair)?  3  -BACILIO (r) PH (t) BACILIO (c)     Standing up from a chair using your arms (e.g., wheelchair, bedside chair)?  3   -BACILIO (r) PH (t) BACILIO (c)     Climbing 3-5 steps with a railing?  2  -BACILIO (r) PH (t) BACILIO (c)     To walk in hospital room?  3  -BACILIO (r) PH (t) BACILIO (c)     AM-PAC 6 Clicks Score (PT)  17  -BACILIO (r) PH (t)     Row Name 08/18/21 1405          Functional Assessment    Outcome Measure Options  AM-PAC 6 Clicks Basic Mobility (PT)  -BACILIO (r) PH (t) BACILIO (c)       User Key  (r) = Recorded By, (t) = Taken By, (c) = Cosigned By    Initials Name Provider Type    Ran Ponce, PT DPT Physical Therapist     Elinor Lovell, PT Student PT Student                       Physical Therapy Education                 Title: PT OT SLP Therapies (In Progress)     Topic: Physical Therapy (In Progress)     Point: Mobility training (Done)     Learning Progress Summary           Patient Acceptance, E, VU,DU by  at 8/18/2021 1405    Comment: Pt educated on safety with mobility. Pt educated to not perform mobility by herself due to decreased sensation in the LE.                   Point: Home exercise program (Not Started)     Learner Progress:  Not documented in this visit.          Point: Body mechanics (Not Started)     Learner Progress:  Not documented in this visit.          Point: Precautions (Not Started)     Learner Progress:  Not documented in this visit.                      User Key     Initials Effective Dates Name Provider Type [facility] 08/04/21 -  Elinor Lovell, PT Student PT Student PT              PT Recommendation and Plan  Planned Therapy Interventions (PT): balance training, bed mobility training, gait training, home exercise program, lumbar stabilization, patient/family education, ROM (range of motion), stair training, strengthening, stretching, transfer training  Plan of Care Reviewed With: patient  Outcome Summary: This pt was admitted on 8/17 for weakness and hyperglycemia. The patient was found to have extremely elevated glucose levels into the one thousand sean. The patient suffers from ESRD in which she receives  dialysis for. The pt was found to have cardiomegaly with intersititual infiltrates. Pt suffers from gastroparesis in which she states that she is unable to control bowel or bladder movements. Pt states that her doctors are aware of this at this time. The patient is scheduled to have a microdiscectomy in this facility on 8/30/21 in which she states she still plans to have the surgery. The pt presents with 4/5 strength on the R LE and 3-/5 strength on the L LE. Pt also has decerased ROM on the L due to weakness/back pain. The patient has absent sensation on the B LE to light touch and deep pressure. Pt states that she lived in a SNF after discharging from this facility earlier in ther year, but she soon moved back home where she lived independently. Pt states that she utilized a walker at home to perform mobility and that she was independent in all ADLs. PT will continue to treat the patient's deficits in order to increase functional mobility/safety awareness. PT recommends a SNF to be the patients discharge destination at this time.     Time Calculation:   PT Charges     Row Name 08/18/21 1521             Time Calculation    Start Time  1405  -BACILIO (r) PH (t) BACILIO (c)      Stop Time  1445  -BACILIO (r) PH (t) BACILIO (c)      Time Calculation (min)  40 min  -BACILIO (r) PH (t)      PT Received On  08/18/21  -BACILIO (r) PH (t) BACILIO (c)      PT Goal Re-Cert Due Date  08/28/21  -BACILIO (r) PH (t) BACILIO (c)         Time Calculation- PT    Total Timed Code Minutes- PT  -- +17 minutes for Chart Review ([id number])  -BACILIO (r) PH (t) BACILIO (c)        User Key  (r) = Recorded By, (t) = Taken By, (c) = Cosigned By    Initials Name Provider Type    Ran Ponce, PT DPT Physical Therapist    Elinor Taylor, PT Student PT Student            PT G-Codes  Outcome Measure Options: AM-PAC 6 Clicks Basic Mobility (PT)  AM-PAC 6 Clicks Score (PT): 17    Elinor Lovell PT Student  8/18/2021

## 2021-08-18 NOTE — PROGRESS NOTES
Nephrology ([facility]) Progress Note      Patient:  Ena Upton  YOB: 1970  Date of Service: 8/18/2021  MRN: [id number]   Acct: [id number]   Primary Care Physician: Norm Wiggins APRN  Advance Directive:   Code Status and Medical Interventions:   Ordered at: 08/17/21 1653     Level Of Support Discussed With:    Patient     Code Status:    CPR     Medical Interventions (Level of Support Prior to Arrest):    Full     Admit Date: 8/17/2021       Hospital Day: 1  Referring Provider: Marcello Lagos MD      Patient personally seen and examined.  Complete chart including Consults, Notes, Operative Reports, Labs, Cardiology, and Radiology studies reviewed as able.        Subjective:  Ena Upton is a 51 y.o. female  whom we were consulted for end stage renal disease. Maintenance hemodialysis on Tuesday Thursday Saturday. Did not go to outpatient treatment on Tuesday, 8/17 due to conflict with a doctor's appointment. Presented to ER later on that same day due to weakness. Found to have blood glucose >1000, potassium 7.3.  Admitted for emergent hemodialysis. Received HD late on 8/17 with significant improvement post treatment    Today is seen during dialysis. Feeling better this morning, not as weak. Overnight was given D5W IV fluids for low blood sugar.   Dialysis   Pt was seen on RRT. Tolerating well  Modality: Hemodialysis  Access: Arterial Venous Fistula  Location: left upper  QB: 450  QD: 700  UF: [id number] as tolerated     Allergies:  Lamisil [terbinafine], Penicillins, Reglan [metoclopramide], and Stadol [butorphanol]    Home Meds:  Medications Prior to Admission   Medication Sig Dispense Refill Last Dose   • albuterol (PROVENTIL HFA;VENTOLIN HFA) 108 (90 BASE) MCG/ACT inhaler Inhale 2 puffs Every 4 (Four) Hours As Needed for wheezing.      • carvedilol (COREG) 12.5 MG tablet Take 1 tablet by mouth 2 (Two) Times a Day With Meals.      • cloNIDine (CATAPRES) 0.2  MG tablet Take 1 tablet by mouth Every 8 (Eight) Hours.      • DULoxetine (CYMBALTA) 60 MG capsule Take 60 mg by mouth daily.      • gabapentin (NEURONTIN) 300 MG capsule Take 1 capsule by mouth Every Night for 3 days. 3 capsule 0    • hydrALAZINE (APRESOLINE) 100 MG tablet Take 1 tablet by mouth 3 (Three) Times a Day.      • insulin detemir (LEVEMIR) 100 UNIT/ML injection Inject 15 Units under the skin into the appropriate area as directed Daily.  12    • lactulose (CHRONULAC) 10 GM/15ML solution Take 20 g by mouth 3 (Three) Times a Day.      • levothyroxine (SYNTHROID, LEVOTHROID) 137 MCG tablet Take 1 tablet by mouth Daily.      • lisinopril (PRINIVIL,ZESTRIL) 20 MG tablet Take 20 mg by mouth 2 (two) times a day.      • NIFEdipine XL (PROCARDIA XL) 90 MG 24 hr tablet Take 1 tablet by mouth Daily.      • ondansetron (ZOFRAN) 4 MG tablet Take 4 mg by mouth Every 8 (Eight) Hours As Needed for nausea or vomiting.      • orphenadrine (NORFLEX) 100 MG 12 hr tablet Take 1 tablet by mouth Every 12 (Twelve) Hours for 3 days. 6 tablet 0    • rOPINIRole (REQUIP) 4 MG tablet Take 4 mg by mouth Every Night.      • senna (SENOKOT) 8.6 MG tablet tablet Take 1 tablet by mouth Every Night.      • simvastatin (ZOCOR) 20 MG tablet Take 20 mg by mouth Daily.          Medicines:  Current Facility-Administered Medications   Medication Dose Route Frequency Provider Last Rate Last Admin   • albuterol (PROVENTIL) nebulizer solution 0.083% 2.5 mg/3mL  2.5 mg Nebulization Q6H PRN Narinder Madrid MD       • atorvastatin (LIPITOR) tablet 10 mg  10 mg Oral Daily Narinder Madrid MD   10 mg at 08/17/21 2040   • carvedilol (COREG) tablet 12.5 mg  12.5 mg Oral BID With Meals Narinder Madrid MD       • cloNIDine (CATAPRES) tablet 0.2 mg  0.2 mg Oral Q8H Narinder Madrid MD   0.2 mg at 08/18/21 0620   • dextrose (D50W) 25 g/ 50mL Intravenous Solution 25 g  25 g Intravenous Q15 Min PRN Ken Moon MD       • dextrose (GLUTOSE) oral gel  15 g  15 g Oral Q15 Min PRN Ken Moon MD       • DULoxetine (CYMBALTA) DR capsule 30 mg  30 mg Oral Daily Narinder Madrid MD       • famotidine (PEPCID) tablet 20 mg  20 mg Oral BID Narinder Madrid MD   20 mg at 08/17/21 2040   • gabapentin (NEURONTIN) capsule 300 mg  300 mg Oral Nightly Narinder Madrid MD   300 mg at 08/17/21 2042   • glucagon (human recombinant) (GLUCAGEN DIAGNOSTIC) injection 1 mg  1 mg Subcutaneous Q15 Min PRN Ken Moon MD       • hydrALAZINE (APRESOLINE) tablet 50 mg  50 mg Oral TID Narinder Madrid MD   50 mg at 08/17/21 2039   • insulin detemir (LEVEMIR) injection 5 Units  5 Units Subcutaneous Daily Ken Moon MD       • insulin lispro (humaLOG) injection 2-7 Units  2-7 Units Subcutaneous 4x Daily With Meals & Nightly Ken Moon MD       • lactulose solution 20 g  20 g Oral TID Narinder Madrid MD   20 g at 08/17/21 2040   • nicotine (NICODERM CQ) 14 MG/24HR patch 1 patch  1 patch Transdermal Q24H Narinder Madrid MD   1 patch at 08/17/21 2040   • NIFEdipine XL (PROCARDIA XL) 24 hr tablet 90 mg  90 mg Oral Q24H Narinder Madrid MD       • ondansetron (ZOFRAN) injection 4 mg  4 mg Intravenous Q6H PRN Narinder Madrid MD       • rOPINIRole (REQUIP) tablet 4 mg  4 mg Oral Nightly Narinder Madrid MD   4 mg at 08/17/21 2040   • senna (SENOKOT) tablet 1 tablet  1 tablet Oral Nightly Narinder Madrid MD   1 tablet at 08/17/21 2040   • sodium chloride 0.9 % flush 10 mL  10 mL Intravenous PRN Narinder Madrid MD       • sodium chloride 0.9 % flush 10 mL  10 mL Intravenous Q12H Narinder Madrid MD   10 mL at 08/18/21 0816   • sodium chloride 0.9 % flush 10 mL  10 mL Intravenous PRN Narinder Madrid MD           Past Medical History:  Past Medical History:   Diagnosis Date   • Anxiety    • Arthritis    • Chronic kidney disease     STAGE V RENAL FAILURE   • Depression    • Diabetes mellitus (CMS/HCC)    • HTN (hypertension)    • Hypothyroidism    • Obesity     • Tremors of nervous system        Past Surgical History:  Past Surgical History:   Procedure Laterality Date   • ARTERIOVENOUS FISTULA     • BREAST BIOPSY     • CARPAL TUNNEL RELEASE     • CHOLECYSTECTOMY     • TUBAL ABDOMINAL LIGATION         Family History  Family History   Problem Relation Age of Onset   • Cancer Other    • Hypertension Other    • Kidney disease Other    • Heart disease Other    • Diabetes Other    • Diabetes Mother    • Diabetes Maternal Aunt    • Diabetes Maternal Grandmother        Social History  Social History     Socioeconomic History   • Marital status:      Spouse name: Not on file   • Number of children: Not on file   • Years of education: Not on file   • Highest education level: Not on file   Tobacco Use   • Smoking status: Current Every Day Smoker     Packs/day: 0.50   • Smokeless tobacco: Never Used   • Tobacco comment: CUTTING BACK AND TRYING TO STOP SMOKING   Substance and Sexual Activity   • Alcohol use: No   • Drug use: No   • Sexual activity: Defer         Review of Systems:  History obtained from chart review and the patient  General ROS: No fever or chills  Respiratory ROS: No cough, shortness of breath, wheezing  Cardiovascular ROS: No chest pain or palpitations  Gastrointestinal ROS: No abdominal pain or melena  Genito-Urinary ROS: No dysuria or hematuria    Objective:  Patient Vitals for the past 24 hrs:   BP Temp Temp src Pulse Resp SpO2 Height Weight   08/18/21 0800 148/76 -- -- 68 -- 91 % -- --   08/18/21 0745 177/73 -- -- 67 -- 93 % -- --   08/18/21 0730 161/75 -- -- 67 -- 100 % -- --   08/18/21 0715 164/84 -- -- 68 -- 99 % -- --   08/18/21 0700 159/76 98.1 °F (36.7 °C) Oral 69 -- 98 % -- --   08/18/21 0620 141/72 -- -- 69 -- -- -- --   08/18/21 0615 141/72 -- -- 67 -- 95 % -- --   08/18/21 0600 134/74 -- -- 72 -- 97 % -- --   08/18/21 0545 151/78 -- -- 69 -- 97 % -- --   08/18/21 0530 149/83 -- -- 68 -- 100 % -- --   08/18/21 0515 164/86 -- -- 68 -- 98 % --  66.4 kg (146 lb 4.8 oz)   08/18/21 0500 142/70 -- -- 67 -- 96 % -- --   08/18/21 0445 143/86 -- -- 68 -- 94 % -- --   08/18/21 0430 163/73 -- -- 67 -- 100 % -- --   08/18/21 0415 167/88 -- -- 66 -- 96 % -- --   08/18/21 0400 -- 98.4 °F (36.9 °C) Oral -- -- -- -- --   08/18/21 0345 150/78 -- -- 67 -- 96 % -- --   08/18/21 0330 159/79 -- -- 67 -- 96 % -- --   08/18/21 0315 163/79 -- -- 67 16 95 % -- --   08/18/21 0300 159/81 -- -- 77 -- 100 % -- --   08/18/21 0245 150/83 -- -- 68 -- 100 % -- --   08/18/21 0230 154/89 -- -- 69 -- 97 % -- --   08/18/21 0215 156/80 -- -- 68 -- 98 % -- --   08/18/21 0200 148/75 -- -- 68 -- 97 % -- --   08/18/21 0145 140/80 -- -- 69 -- 96 % -- --   08/18/21 0130 146/77 -- -- 68 -- 97 % -- --   08/18/21 0115 151/76 -- -- 70 -- 96 % -- --   08/18/21 0100 141/76 -- -- 69 -- 93 % -- --   08/18/21 0045 144/81 -- -- 70 -- 96 % -- --   08/18/21 0030 -- -- -- 70 -- 97 % -- --   08/18/21 0015 156/91 -- -- 71 -- 95 % -- --   08/18/21 0000 157/84 98.3 °F (36.8 °C) Oral 71 -- 92 % -- --   08/17/21 2345 163/81 -- -- 70 -- 91 % -- --   08/17/21 2330 145/78 -- -- 69 -- 94 % -- --   08/17/21 2315 147/75 -- -- 69 -- 94 % -- --   08/17/21 2300 139/76 -- -- 69 -- 93 % -- --   08/17/21 2245 132/74 -- -- 69 -- 92 % -- --   08/17/21 2230 143/74 -- -- 69 -- 91 % -- --   08/17/21 2215 136/78 -- -- 70 -- 92 % -- --   08/17/21 2200 144/95 -- -- 71 -- 92 % -- --   08/17/21 2150 148/78 -- -- 71 -- -- -- --   08/17/21 2145 148/78 -- -- 71 -- 90 % -- --   08/17/21 2130 160/81 -- -- 72 -- (!) 88 % -- --   08/17/21 2115 152/82 -- -- 73 -- 94 % -- --   08/17/21 2100 152/86 -- -- 72 -- 95 % -- --   08/17/21 2045 -- -- -- 72 -- 96 % -- --   08/17/21 2039 147/79 -- -- 72 -- -- -- --   08/17/21 2030 147/79 -- -- 73 -- 97 % -- --   08/17/21 2015 140/74 -- -- 73 -- 92 % -- --   08/17/21 2000 132/69 98 °F (36.7 °C) Oral 72 -- 98 % -- --   08/17/21 1800 133/78 -- -- 71 -- 95 % -- --   08/17/21 1745 128/73 -- -- 69 -- 95 % -- --  "  08/17/21 1700 135/68 -- -- 71 17 98 % -- --   08/17/21 1645 137/72 97.5 °F (36.4 °C) -- 69 16 94 % 170.2 cm (67\") 66.1 kg (145 lb 11.2 oz)   08/17/21 1616 155/90 -- -- 63 -- 100 % -- --   08/17/21 1601 127/58 -- -- 60 -- 100 % -- --   08/17/21 1553 -- -- -- 61 18 100 % -- --   08/17/21 1434 112/70 97.4 °F (36.3 °C) Oral -- -- -- -- --   08/17/21 1429 -- -- -- -- 18 -- -- --   08/17/21 1428 -- -- -- 58 -- 98 % 167.6 cm (66\") 63.5 kg (140 lb)       Intake/Output Summary (Last 24 hours) at 8/18/2021 0850  Last data filed at 8/18/2021 0000  Gross per 24 hour   Intake 1165.47 ml   Output --   Net 1165.47 ml     General: awake/alert    Neck: supple, no JVD  Chest:  clear to auscultation bilaterally without respiratory distress  CVS: regular rate and rhythm  Abdominal: soft, nontender, positive bowel sounds  Extremities: no cyanosis or edema  Skin: warm and dry without rash   Neuro: no focal motor deficits      Labs:  Results from last 7 days   Lab Units 08/18/21  0032 08/17/21  1431   WBC 10*3/mm3 5.37 6.75   HEMOGLOBIN g/dL 8.0* 8.8*   HEMATOCRIT % 23.5* 25.8*   PLATELETS 10*3/mm3 128* 137*         Results from last 7 days   Lab Units 08/18/21  0246 08/17/21 2024 08/17/21  1743 08/17/21  1431 08/17/21  1431   SODIUM mmol/L 133*  --  117*  --  110*   POTASSIUM mmol/L 4.3  --  5.6*  --  7.3*   CHLORIDE mmol/L 96*  --  79*  --  72*   CO2 mmol/L 27.0  --  21.0*  --  18.0*   BUN mg/dL 42*  --  76*  --  86*   CREATININE mg/dL 3.47*  --  4.68*  --  5.22*   CALCIUM mg/dL 9.2  --  8.7  --  8.9   BILIRUBIN mg/dL 0.4  --   --   --  0.6   ALK PHOS U/L 102  --   --   --  116   ALT (SGPT) U/L 11  --   --   --  8   AST (SGOT) U/L 21  --   --   --  12   GLUCOSE mg/dL 67 373* 911*   < > 1,126*    < > = values in this interval not displayed.       Radiology:   Imaging Results (Last 72 Hours)     Procedure Component Value Units Date/Time    XR Chest 1 View [[id number]] Collected: 08/17/21 1608     Updated: 08/17/21 1615    Narrative:   "    EXAM: XR CHEST 1 VW-     HISTORY: weakness; R73.9-Hyperglycemia, unspecified; E87.5-Hyperkalemia;  E87.2-Snay-eoelzssecp and hyponatremia; R53.1-Weakness       COMPARISON: June 19, 2021.     TECHNIQUE: Frontal radiograph of the chest     FINDINGS:   Bilateral interstitial infiltrates are present. This has appearance of  pulmonary vascular congestion.. Cardiac silhouette is mildly enlarged.  Left subclavian endovascular stent graft is present..      The osseous structures and surrounding soft tissues demonstrate no acute  abnormality.          Impression:      1. Cardiomegaly with bilateral interstitial infiltrates most likely  representing mild or early changes of pulmonary vascular congestion.        This report was finalized on 08/17/2021 16:11 by Dr. Lazaro Martinez MD.          Culture:  No results found for: BLOODCX, URINECX, WOUNDCX, MRSACX, RESPCX, STOOLCX      Assessment   1.  End stage renal disease on HD   2.  Essential hypertension  3.  Hyponatremia--improved  4.  Severe hyperkalemia--resolved  5.  Anemia of CKD  6.  Acute pulmonary edema--improved  7.  Secondary hyperparathyroidism  8.  Hyperglycemia--improved    Plan:  1.  Dialysis today  2.  Recommend to avoid large volume hypotonic fluids in ESRD patient  3.  Monitor labs      Delmar Salazar, GAEL  8/18/2021  08:50 CDT

## 2021-08-18 NOTE — PAYOR COMM NOTE
"ADMIT INPT 8-17-21  UR  [phone]  INSULIN GTT    Chemo Upton (51 y.o. Female)     Date of Birth Social Security Number Address Home Phone MRN    [id number]  [id number] [facility] [address] [phone] [id number]    Mandaeism Marital Status          Other        Admission Date Admission Type Admitting Provider Attending Provider Department, Room/Bed    8/17/21 Emergency Narinder Madrid MD Truong, Khai C, MD [facility] CARDIAC CARE, C007/1    Discharge Date Discharge Disposition Discharge Destination                       Attending Provider: Narinder Madrid MD    Allergies: Lamisil [Terbinafine], Penicillins, Reglan [Metoclopramide], Stadol [Butorphanol]    Isolation: None   Infection: None   Code Status: CPR    Ht: 170.2 cm (67\")   Wt: 66.4 kg (146 lb 4.8 oz)    Admission Cmt: None   Principal Problem: None                Active Insurance as of 8/17/2021     Primary Coverage     Payor Plan Insurance Group Employer/Plan Group    [facility] MEDICARE REPLACEMENT [employer] MEDICARE REPLACEMENT      Payor Plan Address Payor Plan Phone Number Payor Plan Fax Number Effective Dates    [address] [address] [phone]  6/1/2021 - None Entered    [facility] [id number]       Subscriber Name Subscriber Birth Date Member ID       CHEMO UPTON [id number]                 Emergency Contacts      (Rel.) Home Phone Work Phone Mobile Phone    Dmitriy Galvez (Friend) -- -- [phone]    Kassandra Paez (Sister) [phone] -- [phone]               History & Physical      Narinder Madrid MD at 08/17/21 1617              [facility] Medicine Services  HISTORY AND PHYSICAL    Date of Admission: 8/17/2021  Primary Care Physician: Norm Wiggins APRN    Subjective     Chief Complaint: Hyperkalemia.  Renal failure on dialysis.  Hyperglycemia.    History of Present Illness  Patient is a 51-year-old  female came to the ER for evaluation of " hyperglycemia and weakness.  Patient has missed his dialysis appointment.  ER physician discussed plan for dialysis today.  Patient is a chronic diabetes, glucose was in the thousand in ER.  Patient will have dialysis Tuesday Thursday and Saturday.  Patient missed Tuesday only.  Patient states she feels weak all day long and check her sugar is too high to read.  Patient was so weak to get around family proceeded to call 911.  Patient was brought to ER to be evaluated.  Patient is on chronic dialysis.    Review of Systems   Constitutional: Positive for activity change, appetite change and fatigue. Negative for chills and fever.   HENT: Negative for hearing loss, nosebleeds, tinnitus and trouble swallowing.    Eyes: Negative for visual disturbance.   Respiratory: Positive for shortness of breath and wheezing. Negative for cough and chest tightness.    Cardiovascular: Positive for leg swelling. Negative for chest pain and palpitations.   Gastrointestinal: Negative for abdominal distention, abdominal pain, blood in stool, constipation, diarrhea, nausea and vomiting.   Endocrine: Negative for cold intolerance, heat intolerance, polydipsia, polyphagia and polyuria.   Genitourinary: Negative for decreased urine volume, difficulty urinating, dysuria, flank pain, frequency and hematuria.   Musculoskeletal: Negative for arthralgias, joint swelling and myalgias.   Skin: Negative for rash.   Allergic/Immunologic: Negative for immunocompromised state.   Neurological: Positive for weakness. Negative for dizziness, syncope, light-headedness and headaches.   Hematological: Negative for adenopathy. Does not bruise/bleed easily.   Psychiatric/Behavioral: Negative for confusion and sleep disturbance. The patient is not nervous/anxious.         Otherwise complete ROS reviewed and negative except as mentioned in the HPI.      Past Medical History:   Past Medical History:   Diagnosis Date   • Anxiety    • Arthritis    • Chronic kidney  disease     STAGE V RENAL FAILURE   • Depression    • Diabetes mellitus (CMS/HCC)    • HTN (hypertension)    • Hypothyroidism    • Obesity    • Tremors of nervous system        Past Surgical History:  Past Surgical History:   Procedure Laterality Date   • ARTERIOVENOUS FISTULA     • BREAST BIOPSY     • CARPAL TUNNEL RELEASE     • CHOLECYSTECTOMY     • TUBAL ABDOMINAL LIGATION         Family History: family history includes Cancer in an other family member; Diabetes in her maternal aunt, maternal grandmother, mother, and another family member; Heart disease in an other family member; Hypertension in an other family member; Kidney disease in an other family member.    Social History:  reports that she has been smoking. She has been smoking about 0.50 packs per day. She has never used smokeless tobacco. She reports that she does not drink alcohol and does not use drugs.    Medications:  Prior to Admission medications    Medication Sig Start Date End Date Taking? Authorizing Provider   albuterol (PROVENTIL HFA;VENTOLIN HFA) 108 (90 BASE) MCG/ACT inhaler Inhale 2 puffs Every 4 (Four) Hours As Needed for wheezing.    Provider, MD Blanche   carvedilol (COREG) 12.5 MG tablet Take 1 tablet by mouth 2 (Two) Times a Day With Meals. 6/29/21   Adán Merrill,    cloNIDine (CATAPRES) 0.2 MG tablet Take 1 tablet by mouth Every 8 (Eight) Hours. 6/29/21   Adán Merrill DO   DULoxetine (CYMBALTA) 60 MG capsule Take 60 mg by mouth daily.    Provider, MD Blanche   gabapentin (NEURONTIN) 300 MG capsule Take 1 capsule by mouth Every Night for 3 days. 6/29/21 7/2/21  Adán Merrill DO   hydrALAZINE (APRESOLINE) 100 MG tablet Take 1 tablet by mouth 3 (Three) Times a Day. 6/29/21   Adán Merrill DO   insulin detemir (LEVEMIR) 100 UNIT/ML injection Inject 15 Units under the skin into the appropriate area as directed Daily. 6/30/21   Adán Merrill DO   lactulose (CHRONULAC) 10 GM/15ML solution Take 20 g by mouth 3  "(Three) Times a Day.    Blanche Neil MD   levothyroxine (SYNTHROID, LEVOTHROID) 137 MCG tablet Take 1 tablet by mouth Daily. 6/30/21   Adán Merrill DO   lisinopril (PRINIVIL,ZESTRIL) 20 MG tablet Take 20 mg by mouth 2 (two) times a day.    Blanche Neil MD   NIFEdipine XL (PROCARDIA XL) 90 MG 24 hr tablet Take 1 tablet by mouth Daily. 6/30/21   Adán Merrill DO   ondansetron (ZOFRAN) 4 MG tablet Take 4 mg by mouth Every 8 (Eight) Hours As Needed for nausea or vomiting.    Blanche Neil MD   orphenadrine (NORFLEX) 100 MG 12 hr tablet Take 1 tablet by mouth Every 12 (Twelve) Hours for 3 days. 6/29/21 7/2/21  Adán Merrill DO   rOPINIRole (REQUIP) 4 MG tablet Take 4 mg by mouth Every Night.    Blanche Neil MD   senna (SENOKOT) 8.6 MG tablet tablet Take 1 tablet by mouth Every Night.    Blanche Neil MD   simvastatin (ZOCOR) 20 MG tablet Take 20 mg by mouth Daily.    Blanche Neil MD     Allergies:  Allergies   Allergen Reactions   • Lamisil [Terbinafine]    • Penicillins    • Reglan [Metoclopramide] GI Intolerance   • Stadol [Butorphanol] Nausea And Vomiting       Objective     Vital Signs: /90   Pulse 63   Temp 97.4 °F (36.3 °C) (Oral)   Resp 18   Ht 167.6 cm (66\")   Wt 63.5 kg (140 lb)   LMP  (LMP Unknown)   SpO2 100%   BMI 22.60 kg/m²   Physical Exam  Vitals and nursing note reviewed.   Constitutional:       Appearance: She is well-developed.   HENT:      Head: Normocephalic.   Eyes:      Conjunctiva/sclera: Conjunctivae normal.      Pupils: Pupils are equal, round, and reactive to light.   Neck:      Vascular: No JVD.   Cardiovascular:      Rate and Rhythm: Normal rate and regular rhythm.      Heart sounds: Normal heart sounds. No murmur heard.   No friction rub. No gallop.    Pulmonary:      Effort: No respiratory distress.      Breath sounds: No wheezing or rales.      Comments: Diminished breath sound bilateral, clear  Chest:      Chest " wall: No tenderness.   Abdominal:      General: Bowel sounds are normal. There is no distension.      Palpations: Abdomen is soft.      Tenderness: There is no abdominal tenderness. There is no guarding or rebound.   Musculoskeletal:         General: No tenderness or deformity. Normal range of motion.      Cervical back: Neck supple.   Skin:     General: Skin is warm and dry.      Capillary Refill: Capillary refill takes 2 to 3 seconds.      Findings: No rash.   Neurological:      Mental Status: She is alert and oriented to person, place, and time.      Cranial Nerves: No cranial nerve deficit.      Motor: Weakness present. No abnormal muscle tone.      Deep Tendon Reflexes: Reflexes normal.   Psychiatric:         Behavior: Behavior normal.         Thought Content: Thought content normal.         Judgment: Judgment normal.              Results Reviewed:    Lab Results (last 24 hours)     Procedure Component Value Units Date/Time    COVID-19,[facility] IN-HOUSE,Nasal Swab No Transport Media 3-4 HR TAT - Swab, Nasal Cavity [[id number]]  (Normal) Collected: 08/17/21 1548    Specimen: Swab from Nasal Cavity Updated: 08/17/21 1642     COVID19 Not Detected    Narrative:      Fact sheet for providers: https://www.fda.gov/media/021322/download     Fact sheet for patients: https://www.fda.gov/media/134305/download    Test performed by PCR.    Consider negative results in combination with clinical observations, patient history, and epidemiological information.    Blood Gas, Arterial - [[id number]]  (Abnormal) Collected: 08/17/21 1550    Specimen: Arterial Blood Updated: 08/17/21 1559     Site Right Radial     Aaron's Test N/A     pH, Arterial 7.370 pH units      pCO2, Arterial 36.8 mm Hg      pO2, Arterial 61.6 mm Hg      Comment: 84 Value below reference range        HCO3, Arterial 21.2 mmol/L      Base Excess, Arterial -3.7 mmol/L      Comment: 84 Value below reference range        O2 Saturation, Arterial 89.8 %      Comment:  84 Value below reference range        Temperature 37.0 C      Barometric Pressure for Blood Gas 748 mmHg      Modality Room Air     Ventilator Mode NA     Collected by [id number]     Comment: Meter: [id number]     :  [id number]        pCO2, Temperature Corrected 36.8 mm Hg      pH, Temp Corrected 7.370 pH Units      pO2, Temperature Corrected 61.6 mm Hg     Osmolality, Serum [[id number]]  (Abnormal) Collected: 08/17/21 1432    Specimen: Blood Updated: 08/17/21 1555     Osmolality 331 mOsm/kg     Phosphorus [[id number]]  (Abnormal) Collected: 08/17/21 1431    Specimen: Blood Updated: 08/17/21 1554     Phosphorus 5.4 mg/dL     [place] Draw [[id number]] Collected: 08/17/21 1431    Specimen: Blood Updated: 08/17/21 1545    Narrative:      The following orders were created for panel order [place] Draw.  Procedure                               Abnormality         Status                     ---------                               -----------         ------                     Green Top (Gel)[[id number]]                                  Final result               Lavender Top[[id number]]                                     Final result               Red Top[[id number]]                                          Final result                 Please view results for these tests on the individual orders.    Green Top (Gel) [[id number]] Collected: 08/17/21 1431    Specimen: Blood Updated: 08/17/21 1545     Extra Tube Hold for add-ons.     Comment: Auto resulted.       Red Top [[id number]] Collected: 08/17/21 1432    Specimen: Blood Updated: 08/17/21 1545     Extra Tube Hold for add-ons.     Comment: Auto resulted.       Lavender Top [[id number]] Collected: 08/17/21 1431    Specimen: Blood Updated: 08/17/21 1545     Extra Tube hold for add-on     Comment: Auto resulted       Comprehensive Metabolic Panel [[id number]]  (Abnormal) Collected: 08/17/21 1431    Specimen: Blood Updated: 08/17/21 1522     Glucose 1,126 mg/dL      BUN 86  mg/dL      Creatinine 5.22 mg/dL      Sodium 110 mmol/L      Potassium 7.3 mmol/L      Chloride 72 mmol/L      CO2 18.0 mmol/L      Calcium 8.9 mg/dL      Total Protein 7.2 g/dL      Albumin 4.10 g/dL      ALT (SGPT) 8 U/L      AST (SGOT) 12 U/L      Alkaline Phosphatase 116 U/L      Total Bilirubin 0.6 mg/dL      eGFR Non African Amer 9 mL/min/1.73      Comment: <15 Indicative of kidney failure.        eGFR   Amer --     Comment: <15 Indicative of kidney failure.        Globulin 3.1 gm/dL      A/G Ratio 1.3 g/dL      BUN/Creatinine Ratio 16.5     Anion Gap 20.0 mmol/L     Narrative:      GFR Normal >60  Chronic Kidney Disease <60  Kidney Failure <15      Troponin [[id number]]  (Abnormal) Collected: 08/17/21 1431    Specimen: Blood Updated: 08/17/21 1509     Troponin T 0.186 ng/mL     Narrative:      Troponin T Reference Range:  <= 0.03 ng/mL-   Negative for AMI  >0.03 ng/mL-     Abnormal for myocardial necrosis.  Clinicians would have to utilize clinical acumen, EKG, Troponin and serial changes to determine if it is an Acute Myocardial Infarction or myocardial injury due to an underlying chronic condition.       Results may be falsely decreased if patient taking Biotin.      Magnesium [[id number]]  (Normal) Collected: 08/17/21 1431    Specimen: Blood Updated: 08/17/21 1502     Magnesium 2.6 mg/dL     CBC & Differential [[id number]]  (Abnormal) Collected: 08/17/21 1431    Specimen: Blood Updated: 08/17/21 0733    Narrative:      The following orders were created for panel order CBC & Differential.  Procedure                               Abnormality         Status                     ---------                               -----------         ------                     CBC Auto Differential[[id number]]        Abnormal            Final result                 Please view results for these tests on the individual orders.    CBC Auto Differential [[id number]]  (Abnormal) Collected: 08/17/21 1431    Specimen: Blood  Updated: 08/17/21 1455     WBC 6.75 10*3/mm3      RBC 2.99 10*6/mm3      Hemoglobin 8.8 g/dL      Hematocrit 25.8 %      MCV 86.3 fL      MCH 29.4 pg      MCHC 34.1 g/dL      RDW 14.3 %      RDW-SD 45.0 fl      MPV 10.8 fL      Platelets 137 10*3/mm3      Neutrophil % 79.0 %      Lymphocyte % 11.0 %      Monocyte % 8.0 %      Eosinophil % 0.4 %      Basophil % 1.3 %      Immature Grans % 0.3 %      Neutrophils, Absolute 5.33 10*3/mm3      Lymphocytes, Absolute 0.74 10*3/mm3      Monocytes, Absolute 0.54 10*3/mm3      Eosinophils, Absolute 0.03 10*3/mm3      Basophils, Absolute 0.09 10*3/mm3      Immature Grans, Absolute 0.02 10*3/mm3      nRBC 0.0 /100 WBC            Radiology Data:    Imaging Results (Last 24 Hours)     Procedure Component Value Units Date/Time    XR Chest 1 View [[id number]] Collected: 08/17/21 1608     Updated: 08/17/21 1615    Narrative:      EXAM: XR CHEST 1 VW-     HISTORY: weakness; R73.9-Hyperglycemia, unspecified; E87.5-Hyperkalemia;  E87.4-Cwun-ssjiatqnuy and hyponatremia; R53.1-Weakness       COMPARISON: June 19, 2021.     TECHNIQUE: Frontal radiograph of the chest     FINDINGS:   Bilateral interstitial infiltrates are present. This has appearance of  pulmonary vascular congestion.. Cardiac silhouette is mildly enlarged.  Left subclavian endovascular stent graft is present..      The osseous structures and surrounding soft tissues demonstrate no acute  abnormality.          Impression:      1. Cardiomegaly with bilateral interstitial infiltrates most likely  representing mild or early changes of pulmonary vascular congestion.        This report was finalized on 08/17/2021 16:11 by Dr. Lazaro Martinez MD.          I have personally reviewed and interpreted the radiology studies and ECG obtained at time of admission.     Assessment / Plan      Assessment & Plan  Active Hospital Problems    Diagnosis    • Hyperkalemia    • Uncontrolled diabetes mellitus (CMS/HCC)    • End stage kidney disease  "(CMS/[facility])    • Hyperglycemia    • Gastroparesis due to DM (CMS/[facility])    • Intractable nausea and vomiting      Plan.    Hyperglycemia.  Plan for dialysis now stat.  Patient start insulin drip in ER.    Chronic renal failure on dialysis.  Plan for dialysis today.    Hyperkalemia.  After dialysis now stat.    Nausea/vomiting.  Pepcid.  Zofran.    Hypertension/hyperlipidemia.  Coreg.  Catapres.  Hydralazine.  Hold lisinopril due to hyperkalemia.  Procardia.  Zocor.    Anxiety/depression.  Cymbalta.    Neuropathy.  Neurontin.  Requip at night.    Chronic smoker.  Nicotine patch.  Discussed patient cutting back and stopping.    Constipation . Lactulose.  Senokot.    Hypothyroidism.  Synthroid.    Code Status: full code     I discussed the patient's findings and my recommendations with: patient    Estimated length of stay: 2-4 days.     Electronically signed by Narinder Madrid MD, 08/17/21, 4:17 PM CDT.              Electronically signed by Narinder Madrid MD at 08/17/21 6835          Emergency Department Notes      Marcello Lagos MD at 08/17/21 1521      Procedure Orders    1. ECG 12 Lead [[id number]] ordered by Marcello Lagos MD               Subjective   51-year-old female presents to the emergency department with complaint of generalized weakness hyperglycemia.  She has a history of diabetes, stage renal disease on dialysis Tuesday Thursday Saturday, missed dialysis today(Tuesday) because she had an appointment with the surgeon who plans to operate on her back.  She also has a history of hypertension and hypothyroidism and peripheral neuropathy.  Patient states she is been feeling generally weak throughout the day.  She checked her blood glucose but it read \"high\".  After patient returned home from her doctor's appointment she was so weak she states all she could do was lay in the floor and could not get up so she called 911.  She does continue to make small amount of urine.  No dysuria hematuria, no " fever, no cough or congestion, no chest pain or shortness of breath.  Rates her symptoms as moderate to severe with no aggravating alleviating factors.      History provided by:  Patient      Review of Systems   All other systems reviewed and are negative.      Past Medical History:   Diagnosis Date   • Anxiety    • Arthritis    • Chronic kidney disease     STAGE V RENAL FAILURE   • Depression    • Diabetes mellitus (CMS/HCC)    • HTN (hypertension)    • Hypothyroidism    • Obesity    • Tremors of nervous system        Allergies   Allergen Reactions   • Lamisil [Terbinafine]    • Penicillins    • Reglan [Metoclopramide] GI Intolerance   • Stadol [Butorphanol] Nausea And Vomiting       Past Surgical History:   Procedure Laterality Date   • ARTERIOVENOUS FISTULA     • BREAST BIOPSY     • CARPAL TUNNEL RELEASE     • CHOLECYSTECTOMY     • TUBAL ABDOMINAL LIGATION         Family History   Problem Relation Age of Onset   • Cancer Other    • Hypertension Other    • Kidney disease Other    • Heart disease Other    • Diabetes Other    • Diabetes Mother    • Diabetes Maternal Aunt    • Diabetes Maternal Grandmother        Social History     Socioeconomic History   • Marital status:      Spouse name: Not on file   • Number of children: Not on file   • Years of education: Not on file   • Highest education level: Not on file   Tobacco Use   • Smoking status: Current Every Day Smoker     Packs/day: 0.50   • Smokeless tobacco: Never Used   • Tobacco comment: CUTTING BACK AND TRYING TO STOP SMOKING   Substance and Sexual Activity   • Alcohol use: No   • Drug use: No   • Sexual activity: Defer           Objective   Physical Exam  Vitals and nursing note reviewed.   Constitutional:       Appearance: She is normal weight. She is ill-appearing.      Comments: Generally weak appearing female who appears older than her stated age who appears ill but is in no distress   HENT:      Head: Normocephalic and atraumatic.      Nose:  Nose normal.      Mouth/Throat:      Mouth: Mucous membranes are moist.      Pharynx: Oropharynx is clear. No oropharyngeal exudate or posterior oropharyngeal erythema.   Eyes:      Extraocular Movements: Extraocular movements intact.      Conjunctiva/sclera: Conjunctivae normal.      Pupils: Pupils are equal, round, and reactive to light.   Cardiovascular:      Rate and Rhythm: Normal rate and regular rhythm.      Pulses: Normal pulses.      Heart sounds: Normal heart sounds.   Pulmonary:      Effort: Pulmonary effort is normal.      Breath sounds: Normal breath sounds. No wheezing, rhonchi or rales.   Abdominal:      General: Abdomen is flat. Bowel sounds are normal. There is no distension.      Palpations: Abdomen is soft.      Tenderness: There is no abdominal tenderness.   Musculoskeletal:         General: No tenderness. Normal range of motion.      Cervical back: Normal range of motion and neck supple. No rigidity. No muscular tenderness.      Right lower leg: No edema.      Left lower leg: No edema.   Skin:     General: Skin is warm and dry.      Capillary Refill: Capillary refill takes less than 2 seconds.      Findings: No rash.   Neurological:      General: No focal deficit present.      Mental Status: She is alert and oriented to person, place, and time. Mental status is at baseline.      Cranial Nerves: No cranial nerve deficit.      Sensory: No sensory deficit.      Motor: No weakness.   Psychiatric:         Mood and Affect: Mood normal.         Behavior: Behavior normal.         Thought Content: Thought content normal.         ECG 12 Lead      Date/Time: 8/17/2021 3:26 PM  Performed by: Marcello Lagos MD  Authorized by: Marcello Lagos MD   Comments: Sinus bradycardia at rate 58, first-degree AV block, left axis deviation, peaked T waves concerning for hyperkalemia, QRS duration normal, no acute ischemic changes, abnormal EKG            Lab Results (last 24 hours)     Procedure Component  Value Units Date/Time    CBC & Differential [[id number]]  (Abnormal) Collected: 08/17/21 1431    Specimen: Blood Updated: 08/17/21 2305    Narrative:      The following orders were created for panel order CBC & Differential.  Procedure                               Abnormality         Status                     ---------                               -----------         ------                     CBC Auto Differential[[id number]]        Abnormal            Final result                 Please view results for these tests on the individual orders.    Magnesium [[id number]]  (Normal) Collected: 08/17/21 1431    Specimen: Blood Updated: 08/17/21 1502     Magnesium 2.6 mg/dL     Comprehensive Metabolic Panel [[id number]]  (Abnormal) Collected: 08/17/21 1431    Specimen: Blood Updated: 08/17/21 1522     Glucose 1,126 mg/dL      BUN 86 mg/dL      Creatinine 5.22 mg/dL      Sodium 110 mmol/L      Potassium 7.3 mmol/L      Chloride 72 mmol/L      CO2 18.0 mmol/L      Calcium 8.9 mg/dL      Total Protein 7.2 g/dL      Albumin 4.10 g/dL      ALT (SGPT) 8 U/L      AST (SGOT) 12 U/L      Alkaline Phosphatase 116 U/L      Total Bilirubin 0.6 mg/dL      eGFR Non African Amer 9 mL/min/1.73      Comment: <15 Indicative of kidney failure.        eGFR   Amer --     Comment: <15 Indicative of kidney failure.        Globulin 3.1 gm/dL      A/G Ratio 1.3 g/dL      BUN/Creatinine Ratio 16.5     Anion Gap 20.0 mmol/L     Narrative:      GFR Normal >60  Chronic Kidney Disease <60  Kidney Failure <15      Troponin [[id number]]  (Abnormal) Collected: 08/17/21 1431    Specimen: Blood Updated: 08/17/21 1509     Troponin T 0.186 ng/mL     Narrative:      Troponin T Reference Range:  <= 0.03 ng/mL-   Negative for AMI  >0.03 ng/mL-     Abnormal for myocardial necrosis.  Clinicians would have to utilize clinical acumen, EKG, Troponin and serial changes to determine if it is an Acute Myocardial Infarction or myocardial injury due to an underlying  "chronic condition.       Results may be falsely decreased if patient taking Biotin.      CBC Auto Differential [[id number]]  (Abnormal) Collected: 08/17/21 1431    Specimen: Blood Updated: 08/17/21 1455     WBC 6.75 10*3/mm3      RBC 2.99 10*6/mm3      Hemoglobin 8.8 g/dL      Hematocrit 25.8 %      MCV 86.3 fL      MCH 29.4 pg      MCHC 34.1 g/dL      RDW 14.3 %      RDW-SD 45.0 fl      MPV 10.8 fL      Platelets 137 10*3/mm3      Neutrophil % 79.0 %      Lymphocyte % 11.0 %      Monocyte % 8.0 %      Eosinophil % 0.4 %      Basophil % 1.3 %      Immature Grans % 0.3 %      Neutrophils, Absolute 5.33 10*3/mm3      Lymphocytes, Absolute 0.74 10*3/mm3      Monocytes, Absolute 0.54 10*3/mm3      Eosinophils, Absolute 0.03 10*3/mm3      Basophils, Absolute 0.09 10*3/mm3      Immature Grans, Absolute 0.02 10*3/mm3      nRBC 0.0 /100 WBC     Phosphorus [[id number]] Collected: 08/17/21 1431    Specimen: Blood Updated: 08/17/21 1544    Osmolality, Serum [[id number]] Collected: 08/17/21 1432    Specimen: Blood Updated: 08/17/21 1529      No Radiology Exams Resulted Within Past 24 Hours    ED Course  ED Course as of Aug 17 1552   Tue Aug 17, 2021   1535 51-year-old female with history of end-stage renal disease on hemodialysis Tuesday Thursday Saturday but skipped dialysis today(Tuesday), diabetes, hypertension who presents emergency department with complaint of generalized weakness and a glucose reading of \"high\" on her glucometer at home.    Will need admission for hyperglycemia, end-stage renal disease with hyperkalemia to 7.3.    On arrival to the ER, patient generally weak appearing but was not in any acute distress.  EKG with peaked T waves but no QRS widening.   CMP reveals glucose 1126, sodium 110(corrects to 128-135), potassium 7.3, BUN 86, creatinine 5.22.  She does have a metabolic acidosis, bicarb was 18, anion gap 20.  Troponin elevated but near baseline, likely not related to ACS in setting of end-stage renal " disease.    Discussed case with hospitalist, will admit the patient to the intensive care unit.  We have called nephrology for emergency dialysis, return call pending.  In the interim, the patient's hyperkalemia, will go ahead and give bicarb, insulin, calcium gluconate and hour-long neb treatment.      [AW]   1552 Discussed case with nephrology, Dr. Avitia.  Will take patient for emergency dialysis.    [AW]      ED Course User Index  [AW] Marcello Lagos MD                                           MDM  Number of Diagnoses or Management Options  Generalized weakness: new and requires workup  Hyperglycemia: new and requires workup  Hyperkalemia: new and requires workup  Hyponatremia: new and requires workup     Amount and/or Complexity of Data Reviewed  Clinical lab tests: reviewed  Tests in the radiology section of CPT®: reviewed  Decide to obtain previous medical records or to obtain history from someone other than the patient: yes    Risk of Complications, Morbidity, and/or Mortality  Presenting problems: high  Diagnostic procedures: high  Management options: high    Patient Progress  Patient progress: stable      Final diagnoses:   Hyperkalemia   Hyperglycemia   Hyponatremia   Generalized weakness       ED Disposition  ED Disposition     ED Disposition Condition Comment    Decision to Admit  Level of Care: Critical Care [6]   Diagnosis: Hyperglycemia [993988]   Admitting Physician: ROMAN PRIETO [[id number]]   Attending Physician: ROMAN PRIETO [[id number]]   Isolate for COVID?: No [0]   Certification: I Certify That Inpatient Hospital Services Are Medically Necessary For Greater Than 2 Midnights            No follow-up provider specified.       Medication List      No changes were made to your prescriptions during this visit.          Marcello Lagos MD  08/17/21 9562      Electronically signed by Marcello Lagos MD at 08/17/21 1552       Intake & Output (last 2 days)       08/16 0701 - 08/17 0700  08/17 0701 - 08/18 0700 08/18 0701 - 08/19 0700    I.V. (mL/kg)  65.5 (1)     IV Piggyback  1100     Total Intake(mL/kg)  1165.5 (17.6)     Net  +1165.5            Urine Unmeasured Occurrence  1 x     Stool Unmeasured Occurrence  2 x         Lines, Drains & Airways    Active LDAs     Name:   Placement date:   Placement time:   Site:   Days:    Peripheral IV 08/17/21 1431 Right Antecubital   08/17/21    1431    Antecubital   less than 1    Peripheral IV 08/17/21 1533 Right Hand   08/17/21    1533    Hand   less than 1         Inactive LDAs     None                  Facility-Administered Medications as of 8/18/2021   Medication Dose Route Frequency Provider Last Rate Last Admin   • [COMPLETED] albuterol (PROVENTIL) nebulizer solution 0.083% 2.5 mg/3mL  10 mg Nebulization Once Over 1 Hour Marcello Lagos MD   10 mg at 08/17/21 1553   • albuterol (PROVENTIL) nebulizer solution 0.083% 2.5 mg/3mL  2.5 mg Nebulization Q6H PRN Narinder Madrid MD       • atorvastatin (LIPITOR) tablet 10 mg  10 mg Oral Daily Narinder Madrid MD   10 mg at 08/17/21 2040   • [COMPLETED] calcium gluconate 1 g in sodium chloride 0.9 % 100 mL IVPB  1 g Intravenous Once Marcello Lagos MD   Stopped at 08/17/21 1623   • carvedilol (COREG) tablet 12.5 mg  12.5 mg Oral BID With Meals Narinder Madrid MD       • cloNIDine (CATAPRES) tablet 0.2 mg  0.2 mg Oral Q8H Narinder Madrid MD   0.2 mg at 08/18/21 0620   • dextrose (D50W) 25 g/ 50mL Intravenous Solution 12.5 g  12.5 g Intravenous PRN Narinder Madrid MD   12.5 g at 08/18/21 0258   • dextrose (D50W) 25 g/ 50mL Intravenous Solution 25 g  25 g Intravenous Q15 Min PRN Narinder Madrid MD   25 g at 08/18/21 0311   • dextrose (D50W) 25 g/ 50mL Intravenous Solution 25 g  25 g Intravenous Q15 Min PRN Ken Moon MD       • dextrose (GLUTOSE) oral gel 15 g  15 g Oral Q15 Min PRN Narinder Madrid MD       • dextrose (GLUTOSE) oral gel 15 g  15 g Oral Q15 Min PRN Ken Moon  MD Forest       • dextrose 5 % and sodium chloride 0.45 % infusion  150 mL/hr Intravenous Continuous PRN Narinder Madrid MD   Stopped at 08/18/21 0502   • dextrose 5 % and sodium chloride 0.45 % with KCl 20 mEq/L infusion  150 mL/hr Intravenous Continuous PRN Narinder Madrid MD       • dextrose 5 % and sodium chloride 0.45 % with KCl 40 mEq/L infusion  150 mL/hr Intravenous Continuous PRN Narinder Madrid MD       • dextrose 5 % and sodium chloride 0.9 % infusion  150 mL/hr Intravenous Continuous PRN Narinder Madrid MD       • dextrose 5 % and sodium chloride 0.9 % with KCl 20 mEq/L infusion  150 mL/hr Intravenous Continuous PRN Narinder Madrid MD       • dextrose 5 % and sodium chloride 0.9 % with KCl 40 mEq/L infusion  150 mL/hr Intravenous Continuous PRN Narinder Madrid MD       • DULoxetine (CYMBALTA) DR capsule 30 mg  30 mg Oral Daily Narinder Madrid MD       • famotidine (PEPCID) tablet 20 mg  20 mg Oral BID Narinder Madrid MD   20 mg at 08/17/21 2040   • gabapentin (NEURONTIN) capsule 300 mg  300 mg Oral Nightly Narinder Madrid MD   300 mg at 08/17/21 2042   • glucagon (human recombinant) (GLUCAGEN DIAGNOSTIC) injection 1 mg  1 mg Subcutaneous Q15 Min PRN Narinder Madrid MD       • glucagon (human recombinant) (GLUCAGEN DIAGNOSTIC) injection 1 mg  1 mg Subcutaneous Q15 Min PRN Ken Moon MD       • hydrALAZINE (APRESOLINE) tablet 50 mg  50 mg Oral TID Narinder Madrid MD   50 mg at 08/17/21 2039   • [COMPLETED] HYDROmorphone (DILAUDID) injection 0.5 mg  0.5 mg Intravenous Once Marcello Lagos MD   0.5 mg at 08/17/21 1622   • insulin detemir (LEVEMIR) injection 5 Units  5 Units Subcutaneous Daily Ken Moon MD       • insulin lispro (humaLOG) injection 2-7 Units  2-7 Units Subcutaneous 4x Daily With Meals & Nightly Ken Moon MD       • lactulose solution 20 g  20 g Oral TID Narinder Madrid MD   20 g at 08/17/21 2040   • nicotine (NICODERM CQ) 14 MG/24HR patch 1  patch  1 patch Transdermal Q24H Narinder Madrid MD   1 patch at 08/17/21 2040   • NIFEdipine XL (PROCARDIA XL) 24 hr tablet 90 mg  90 mg Oral Q24H Narinder Madrid MD       • [COMPLETED] ondansetron (ZOFRAN) injection 4 mg  4 mg Intravenous Once Marcello Lagos MD   4 mg at 08/17/21 1508   • [COMPLETED] ondansetron (ZOFRAN) injection 4 mg  4 mg Intravenous Once Marcello Lagos MD   4 mg at 08/17/21 1621   • ondansetron (ZOFRAN) injection 4 mg  4 mg Intravenous Q6H PRN Narinder Madrid MD       • rOPINIRole (REQUIP) tablet 4 mg  4 mg Oral Nightly Narinder Madrid MD   4 mg at 08/17/21 2040   • senna (SENOKOT) tablet 1 tablet  1 tablet Oral Nightly Narinder Madrid MD   1 tablet at 08/17/21 2040   • [COMPLETED] sodium bicarbonate injection 8.4% 50 mEq  50 mEq Intravenous Once Marcello Lagos MD   50 mEq at 08/17/21 1541   • sodium chloride 0.45 % infusion  250 mL/hr Intravenous Continuous PRN Narinder Madrid MD       • sodium chloride 0.45 % with KCl 20 mEq/L infusion  250 mL/hr Intravenous Continuous PRN Narinder Madrid MD       • [COMPLETED] sodium chloride 0.9 % bolus 500 mL  500 mL Intravenous Once Marcello Lgaos MD   Stopped at 08/17/21 1549   • [COMPLETED] sodium chloride 0.9 % bolus 500 mL  500 mL Intravenous Once Marcello Lagos MD   Stopped at 08/17/21 1623   • sodium chloride 0.9 % flush 10 mL  10 mL Intravenous PRN Narinder Madrid MD       • sodium chloride 0.9 % flush 10 mL  10 mL Intravenous Q12H Narinder Madrid MD       • sodium chloride 0.9 % flush 10 mL  10 mL Intravenous PRN Narinder Madrid MD       • sodium chloride 0.9 % with KCl 20 mEq/L infusion  250 mL/hr Intravenous Continuous PRN Narinder Madrid MD       • sodium chloride 0.9 % with KCl 40 mEq/L infusion  250 mL/hr Intravenous Continuous PRN Narinder Madrid MD           Orders (last 48 hrs)      Start     Ordered    08/18/21 0900  insulin detemir (LEVEMIR) injection 5 Units  Daily      08/18/21 0635    08/18/21  0800  insulin lispro (humaLOG) injection 2-7 Units  4 Times Daily With Meals & Nightly      08/18/21 0635    08/18/21 0730  insulin lispro (humaLOG) injection 0-9 Units  3 Times Daily Before Meals,   Status:  Discontinued      08/18/21 0632    08/18/21 0711  Hemodialysis Inpatient  Once      08/18/21 0710    08/18/21 0700  POC Glucose 4x Daily AC & at Bedtime  4 Times Daily Before Meals & at Bedtime     Comments: If bedtime blood glucose is greater than 350 mg/dl, call MD.      08/18/21 0632    08/18/21 0700  POC Glucose 4x Daily AC & at Bedtime  4 Times Daily Before Meals & at Bedtime,   Status:  Canceled     Comments: If bedtime blood glucose is greater than 350 mg/dl, call MD.      08/18/21 0635    08/18/21 0659  Hemodialysis Inpatient  Once,   Status:  Canceled      08/18/21 0701    08/18/21 0636  Do NOT Hold Basal or Correction Scale Insulin When Patient is NPO, Hold Scheduled Mealtime (Bolus) Insulin if NPO  Continuous      08/18/21 0635    08/18/21 0636  Follow [facility] Hypoglycemia Standing Orders For Blood Glucose Less Than 70 mg/dL  Until Discontinued     Comments: ALERT PATIENT - NOT NPO & CAN SAFELY SWALLOW  Administer 4 oz Fruit Juice OR 4 oz Regular Soda OR 8 oz Milk OR 15-30 grams (1 tube) of Glucose Gel.  Recheck Blood Glucose Approximately 15 Minutes After Ingestion, Repeat Treatment & Continue to Recheck Blood Sugar Approximately Every 15 Minutes Until Blood Glucose is 70 or Higher.  Once Blood Glucose is 70 or Higher & if It Will Be More Than 60 Minutes Until Next Meal, Provide Appropriate Snack (Including Carbohydrate Food) Based on Meal Plan Order. Give Meal Tray As Soon As Possible.    PATIENT HAS IV ACCESS - UNRESPONSIVE, NPO OR UNABLE TO SAFELY SWALLOW  Administer 25g (50ml) D50W IV Push.  Recheck Blood Glucose Approximately 15 Minutes After Administration, if Blood Glucose Remains Less Than 70, Repeat Treatment   Recheck Blood Glucose Approximately 15 Minutes After 2nd Administration, if Blood  Glucose Remains Less Than 70 After 2nd Dose of D50W, Contact Provider for Further Treatment Orders & Consider Adding IVF With D5W for Maintenance    PATIENT WITHOUT IV ACCESS - UNRESPONSIVE, NPO OR UNABLE TO SAFELY SWALLOW  Administer 1mg Glucagon SQ & Establish IV Access.  Turn Patient on Side - Nausea / Vomiting May Occur.  Recheck Blood Glucose Approximately 15 Minutes After Administration.  If Blood Glucose Remains Less Than 70, Administer 25g D50W IV Push (50ml).  Recheck Blood Glucose Approximately 15 Minutes After Administration of D50W, if Blood Glucose Remains Less Than 70, Contact Provider for Further Treatment Orders & Consider Adding IVF With D5 for Maintenance    Document Event & Patient Response to Interventions in EMR, Document Medications on MAR  Notify Provider if Hypoglycemia Treatment Needed    08/18/21 0635    08/18/21 0635  dextrose (GLUTOSE) oral gel 15 g  Every 15 Minutes PRN      08/18/21 0635    08/18/21 0635  dextrose (D50W) 25 g/ 50mL Intravenous Solution 25 g  Every 15 Minutes PRN      08/18/21 0635    08/18/21 0635  glucagon (human recombinant) (GLUCAGEN DIAGNOSTIC) injection 1 mg  Every 15 Minutes PRN      08/18/21 0635    08/18/21 0633  POC Glucose Once  Once      08/18/21 0621    08/18/21 0632  Do NOT Hold Basal or Correction Scale Insulin When Patient is NPO, Hold Scheduled Mealtime (Bolus) Insulin if NPO  Continuous      08/18/21 0632    08/18/21 0632  Follow [facility] Hypoglycemia Standing Orders For Blood Glucose Less Than 70 mg/dL  Until Discontinued     Comments: ALERT PATIENT - NOT NPO & CAN SAFELY SWALLOW  Administer 4 oz Fruit Juice OR 4 oz Regular Soda OR 8 oz Milk OR 15-30 grams (1 tube) of Glucose Gel.  Recheck Blood Glucose Approximately 15 Minutes After Ingestion, Repeat Treatment & Continue to Recheck Blood Sugar Approximately Every 15 Minutes Until Blood Glucose is 70 or Higher.  Once Blood Glucose is 70 or Higher & if It Will Be More Than 60 Minutes Until Next Meal, Provide  Appropriate Snack (Including Carbohydrate Food) Based on Meal Plan Order. Give Meal Tray As Soon As Possible.    PATIENT HAS IV ACCESS - UNRESPONSIVE, NPO OR UNABLE TO SAFELY SWALLOW  Administer 25g (50ml) D50W IV Push.  Recheck Blood Glucose Approximately 15 Minutes After Administration, if Blood Glucose Remains Less Than 70, Repeat Treatment   Recheck Blood Glucose Approximately 15 Minutes After 2nd Administration, if Blood Glucose Remains Less Than 70 After 2nd Dose of D50W, Contact Provider for Further Treatment Orders & Consider Adding IVF With D5W for Maintenance    PATIENT WITHOUT IV ACCESS - UNRESPONSIVE, NPO OR UNABLE TO SAFELY SWALLOW  Administer 1mg Glucagon SQ & Establish IV Access.  Turn Patient on Side - Nausea / Vomiting May Occur.  Recheck Blood Glucose Approximately 15 Minutes After Administration.  If Blood Glucose Remains Less Than 70, Administer 25g D50W IV Push (50ml).  Recheck Blood Glucose Approximately 15 Minutes After Administration of D50W, if Blood Glucose Remains Less Than 70, Contact Provider for Further Treatment Orders & Consider Adding IVF With D5 for Maintenance    Document Event & Patient Response to Interventions in EMR, Document Medications on MAR  Notify Provider if Hypoglycemia Treatment Needed    08/18/21 0632    08/18/21 0631  dextrose (GLUTOSE) oral gel 15 g  Every 15 Minutes PRN,   Status:  Discontinued      08/18/21 0632    08/18/21 0631  dextrose (D50W) 25 g/ 50mL Intravenous Solution 25 g  Every 15 Minutes PRN,   Status:  Discontinued      08/18/21 0632    08/18/21 0631  glucagon (human recombinant) (GLUCAGEN DIAGNOSTIC) injection 1 mg  Every 15 Minutes PRN,   Status:  Discontinued      08/18/21 0632    08/18/21 0600  CBC Auto Differential  Morning Draw      08/17/21 1653    08/18/21 0600  Comprehensive Metabolic Panel  Morning Draw      08/17/21 1653    08/18/21 0600  Hemoglobin A1c  Morning Draw      08/17/21 1653    08/18/21 0600  TSH  Morning Draw      08/17/21 1653     08/18/21 0600  Lipid Panel  Morning Draw      08/17/21 1653    08/18/21 0531  Diet Regular; Consistent Carbohydrate  Diet Effective Now      08/18/21 0530    08/18/21 0512  POC Glucose Once  Once      08/18/21 0459    08/18/21 0406  POC Glucose Once  Once      08/18/21 0355    08/18/21 0309  POC Glucose Once  Once      08/18/21 0257    08/18/21 0212  POC Glucose Once  Once      08/18/21 0200    08/18/21 0111  POC Glucose Once  Once      08/18/21 0059    08/18/21 0007  POC Glucose Once  Once      08/17/21 2355    08/18/21 0001  Magnesium  Every 4 Hours      08/17/21 1705    08/18/21 0001  Phosphorus  Every 4 Hours      08/17/21 1705    08/17/21 2305  POC Glucose Once  Once      08/17/21 2253    08/17/21 2200  cloNIDine (CATAPRES) tablet 0.2 mg  Every 8 Hours Scheduled      08/17/21 1653    08/17/21 2200  POC Glucose Once  Once      08/17/21 2148    08/17/21 2107  POC Glucose Once  Once      08/17/21 2055    08/17/21 2100  gabapentin (NEURONTIN) capsule 300 mg  Nightly      08/17/21 1653    08/17/21 2100  famotidine (PEPCID) tablet 20 mg  2 Times Daily      08/17/21 1653    08/17/21 2100  rOPINIRole (REQUIP) tablet 4 mg  Nightly      08/17/21 1653    08/17/21 2100  senna (SENOKOT) tablet 1 tablet  Nightly      08/17/21 1653    08/17/21 2100  sodium chloride 0.9 % flush 10 mL  Every 12 Hours Scheduled      08/17/21 1653    08/17/21 2000  Vital Signs  Every 4 Hours      08/17/21 1653    08/17/21 1953  Glucose, Random  STAT      08/17/21 1953    08/17/21 1901  Glucose, Random  STAT,   Status:  Canceled      08/17/21 1900    08/17/21 1844  Hemodialysis Inpatient  Once,   Status:  Canceled      08/17/21 1847    08/17/21 1800  carvedilol (COREG) tablet 12.5 mg  2 Times Daily With Meals      08/17/21 1653    08/17/21 1800  Basic Metabolic Panel  Every 6 Hours      08/17/21 1653    08/17/21 1800  insulin regular (HumuLIN R,NovoLIN R) 100 Units in sodium chloride 0.9 % 100 mL (1 Units/mL) infusion  Titrated,   Status:   Discontinued      08/17/21 1705 08/17/21 1748  DIALYSIS PATIENT....... DO NOT USE PRN FLUID REHYDRATION ORDERS  Nursing Communication  Continuous     Comments: DIALYSIS PATIENT....... DO NOT USE PRN FLUID REHYDRATION ORDERS    08/17/21 1749 08/17/21 1745  nicotine (NICODERM CQ) 14 MG/24HR patch 1 patch  Every 24 Hours Scheduled      08/17/21 1653    08/17/21 1745  DULoxetine (CYMBALTA) DR capsule 30 mg  Daily      08/17/21 1653 08/17/21 1745  hydrALAZINE (APRESOLINE) tablet 50 mg  3 Times Daily      08/17/21 1653    08/17/21 1745  lactulose solution 20 g  3 Times Daily      08/17/21 1653 08/17/21 1745  NIFEdipine XL (PROCARDIA XL) 24 hr tablet 90 mg  Every 24 Hours Scheduled      08/17/21 1653 08/17/21 1745  atorvastatin (LIPITOR) tablet 10 mg  Daily      08/17/21 1653 08/17/21 1732  Glucose, Random  STAT,   Status:  Canceled      08/17/21 1732    08/17/21 1730  insulin regular (humuLIN R,novoLIN R) injection 7 Units  3 Times Daily Before Meals,   Status:  Discontinued      08/17/21 1519    08/17/21 1705  Use Instructions for DKA:  Per Order Details     Comments: Serum Glucose > 250 mg/dL, Arterial pH </=7.3, Bicarbonate </= 15 mEq/L, and at least Moderate Ketonuria or Ketonemia    08/17/21 1705 08/17/21 1700  Strict Intake & Output  Every Hour      08/17/21 1653 08/17/21 1700  Pregnancy, Urine - Urine, Clean Catch  Once,   Status:  Canceled      08/17/21 1705    08/17/21 1656  Once DKA or HHS Meets Resolution Criteria, Call Provider for Transition Orders from Intravenous to SQ Insulin and IV Fluids.  Per Order Details     Comments: RESOLUTION of DKA: Blood glucose < 200 mg/dL,AND TWO of the following criteria: Serum Bicarbonate > 15, Venous pH > 7.3, or Calculated Anion Gap </= 12 mEq/l.  RESOLUTION of HHS is associated with: Normal osmolality (effective plasma osmolality < 305 mOsm/kg) and patient is mentally alert.    08/17/21 1705    08/17/21 1656  Initiate Hypoglycemia Standing Orders if  BG is Less Than 70 mg/dL and Call Provider  Per Order Details      08/17/21 1705 08/17/21 1656  Formula for Corrected Serum Sodium: Corrected Na= (measured Sodium + [1.6 x ((Glucose - 100)/100)]  Per Order Details      08/17/21 1705 08/17/21 1656  Use a Dedicated Line for Insulin Infusion (If Possible).  May Use a Carrier Fluid of NS at KVO Rate if Insulin Rate is Insufficient to Maintain IV Patency.  Prime IV Line With Insulin Infusion  Continuous      08/17/21 1705 08/17/21 1656  Saline Lock & Maintain IV Access  Continuous      08/17/21 1705 08/17/21 1656  NPO Diet  Diet Effective Now,   Status:  Canceled      08/17/21 1705 08/17/21 1656  Inpatient Nutrition Consult  Once     Provider:  (Not yet assigned)    08/17/21 1705 08/17/21 1656  Inpatient Diabetes Educator Consult  Once     Provider:  (Not yet assigned)    08/17/21 1705 08/17/21 1655  dextrose 5 % and sodium chloride 0.45 % with KCl 20 mEq/L infusion  Continuous PRN      08/17/21 1705 08/17/21 1655  dextrose 5 % and sodium chloride 0.45 % with KCl 40 mEq/L infusion  Continuous PRN      08/17/21 1705 08/17/21 1655  dextrose (D50W) 25 g/ 50mL Intravenous Solution 12.5 g  As Needed      08/17/21 1705 08/17/21 1655  sodium chloride 0.9 % with KCl 20 mEq/L infusion  Continuous PRN      08/17/21 1705 08/17/21 1655  sodium chloride 0.9 % with KCl 40 mEq/L infusion  Continuous PRN      08/17/21 1705 08/17/21 1655  dextrose 5 % and sodium chloride 0.9 % infusion  Continuous PRN      08/17/21 1705 08/17/21 1655  dextrose 5 % and sodium chloride 0.9 % with KCl 40 mEq/L infusion  Continuous PRN      08/17/21 1705 08/17/21 1655  dextrose 5 % and sodium chloride 0.9 % with KCl 20 mEq/L infusion  Continuous PRN      08/17/21 1705 08/17/21 1655  sodium chloride 0.45 % infusion  Continuous PRN      08/17/21 1705 08/17/21 1655  sodium chloride 0.45 % with KCl 20 mEq/L infusion  Continuous PRN      08/17/21 1700    08/17/21  1655  dextrose 5 % and sodium chloride 0.45 % infusion  Continuous PRN      08/17/21 1705    08/17/21 1654  Daily Weights  Daily      08/17/21 1653    08/17/21 1654  NPO Diet NPO Except: Ice Chips, Sips With Meds  Diet Effective Now,   Status:  Canceled      08/17/21 1653    08/17/21 1650  Place Sequential Compression Device  Once      08/17/21 1653    08/17/21 1650  Maintain Sequential Compression Device  Continuous      08/17/21 1653    08/17/21 1650  Cardiac Monitoring  Continuous      08/17/21 1653    08/17/21 1650  Tobacco Cessation Education  Once      08/17/21 1653    08/17/21 1650  OT Consult: Eval & Treat  Once      08/17/21 1653    08/17/21 1650  PT Consult: Eval & Treat Discharge Placement Assessment, Functional Mobility Below Baseline  Once      08/17/21 1653    08/17/21 1649  Intake & Output  Every Shift      08/17/21 1653    08/17/21 1649  Weigh Patient  Once      08/17/21 1653    08/17/21 1649  Oxygen Therapy- Nasal Cannula; Titrate for SPO2: 90% - 95%  Continuous      08/17/21 1653    08/17/21 1649  Insert Peripheral IV  Once      08/17/21 1653    08/17/21 1649  Saline Lock & Maintain IV Access  Continuous,   Status:  Canceled      08/17/21 1653    08/17/21 1649  Do NOT Hold Basal or Correction Scale Insulin When Patient is NPO, Hold Scheduled Mealtime (Bolus) Insulin if NPO  Continuous      08/17/21 1653 08/17/21 1649  Follow [facility] Hypoglycemia Standing Orders For Blood Glucose Less Than 70 mg/dL  Until Discontinued     Comments: ALERT PATIENT - NOT NPO & CAN SAFELY SWALLOW  Administer 4 oz Fruit Juice OR 4 oz Regular Soda OR 8 oz Milk OR 15-30 grams (1 tube) of Glucose Gel.  Recheck Blood Glucose Approximately 15 Minutes After Ingestion, Repeat Treatment & Continue to Recheck Blood Sugar Approximately Every 15 Minutes Until Blood Glucose is 70 or Higher.  Once Blood Glucose is 70 or Higher & if It Will Be More Than 60 Minutes Until Next Meal, Provide Appropriate Snack (Including Carbohydrate Food)  Based on Meal Plan Order. Give Meal Tray As Soon As Possible.    PATIENT HAS IV ACCESS - UNRESPONSIVE, NPO OR UNABLE TO SAFELY SWALLOW  Administer 25g (50ml) D50W IV Push.  Recheck Blood Glucose Approximately 15 Minutes After Administration, if Blood Glucose Remains Less Than 70, Repeat Treatment   Recheck Blood Glucose Approximately 15 Minutes After 2nd Administration, if Blood Glucose Remains Less Than 70 After 2nd Dose of D50W, Contact Provider for Further Treatment Orders & Consider Adding IVF With D5W for Maintenance    PATIENT WITHOUT IV ACCESS - UNRESPONSIVE, NPO OR UNABLE TO SAFELY SWALLOW  Administer 1mg Glucagon SQ & Establish IV Access.  Turn Patient on Side - Nausea / Vomiting May Occur.  Recheck Blood Glucose Approximately 15 Minutes After Administration.  If Blood Glucose Remains Less Than 70, Administer 25g D50W IV Push (50ml).  Recheck Blood Glucose Approximately 15 Minutes After Administration of D50W, if Blood Glucose Remains Less Than 70, Contact Provider for Further Treatment Orders & Consider Adding IVF With D5 for Maintenance    Document Event & Patient Response to Interventions in EMR, Document Medications on MAR  Notify Provider if Hypoglycemia Treatment Needed    08/17/21 1653    08/17/21 1649  Code Status and Medical Interventions:  Continuous      08/17/21 1653    08/17/21 1648  dextrose (D50W) 25 g/ 50mL Intravenous Solution 25 g  Every 15 Minutes PRN      08/17/21 1653    08/17/21 1648  glucagon (human recombinant) (GLUCAGEN DIAGNOSTIC) injection 1 mg  Every 15 Minutes PRN      08/17/21 1653    08/17/21 1648  sodium chloride 0.9 % flush 10 mL  As Needed      08/17/21 1653    08/17/21 1648  dextrose (GLUTOSE) oral gel 15 g  Every 15 Minutes PRN      08/17/21 1653    08/17/21 1647  ondansetron (ZOFRAN) injection 4 mg  Every 6 Hours PRN      08/17/21 1653    08/17/21 1643  albuterol (PROVENTIL) nebulizer solution 0.083% 2.5 mg/3mL  Every 6 Hours PRN      08/17/21 1653    08/17/21 1605   HYDROmorphone (DILAUDID) injection 0.5 mg  Once      08/17/21 1603    08/17/21 1605  ondansetron (ZOFRAN) injection 4 mg  Once      08/17/21 1603    08/17/21 1600  POC Glucose Q1H  Every Hour,   Status:  Canceled      08/17/21 1535    08/17/21 1600  Blood Gas, Arterial -  Once      08/17/21 1550    08/17/21 1552  Inpatient Admission  Once      08/17/21 1551    08/17/21 1543  Phosphorus  STAT      08/17/21 1542    08/17/21 1543  COVID-19,[facility] IN-HOUSE,Nasal Swab No Transport Media 3-4 HR TAT - Swab, Nasal Cavity  Once      08/17/21 1542    08/17/21 1542  Inpatient Admission  Once      08/17/21 1541    08/17/21 1536  insulin regular (HumuLIN R,NovoLIN R) 100 Units in sodium chloride 0.9 % 100 mL (1 Units/mL) infusion  Titrated,   Status:  Discontinued      08/17/21 1535    08/17/21 1535  Blood Gas, Arterial -  STAT      08/17/21 1534    08/17/21 1535  Initiate Insulin Drip Protocol  Until Discontinued,   Status:  Canceled      08/17/21 1535    08/17/21 1535  Initiate Insulin Drip if Patient Has Blood Glucose 180 or Greater on 2 Consecutive Checks, or if Blood Glucose 250 or Greater at Any Time  Continuous,   Status:  Canceled      08/17/21 1535    08/17/21 1535  Ensure All Previous Diabetes Medication Orders Are Discontinued  Once,   Status:  Canceled      08/17/21 1535    08/17/21 1535  Check Blood Glucose On Arrival & Every Hour; If Glucose 100-180 on Same Insulin Rate for 4 Hours, Check Blood Glucose Every 2 Hours  Continuous,   Status:  Canceled      08/17/21 1535    08/17/21 1535  Goal - Lower Blood Glucose By No More Than 100/hr - Optimal Glucose 111-180  Continuous,   Status:  Canceled      08/17/21 1535    08/17/21 1535  Insulin Infusion Adjustment Criteria  Per Order Details,   Status:  Canceled     Comments: A) Glucose Drops  mg/dL Since Last Check - Do NOT Increase Insulin Infusion  B) Glucose Drops Greater Than 100 mg/dL Since Last Check - STOP INSULIN Infusion & Recheck Glucose Every Hour  -  When Blood Glucose Greater Than 180, Restart Insulin Drip at 50% of Previous Insulin Rate (Round to the Nearest Whole Unit)    08/17/21 1535    08/17/21 1535  Notify Provider if Glucose Remains Greater Than 250 After 4 Hours of Insulin Infusion  Until Discontinued,   Status:  Canceled      08/17/21 1535    08/17/21 1535  Notify Provider for Changes in Nutrition or Steroids  Until Discontinued,   Status:  Canceled      08/17/21 1535    08/17/21 1535  Do NOT Discontinue Insulin Drip Without Transition Insulin or Subcutaneous Insulin Orders  Continuous,   Status:  Canceled      08/17/21 1535    08/17/21 1534  dextrose (D50W) 25 g/ 50mL Intravenous Solution 25-50 mL  Every 30 Minutes PRN,   Status:  Discontinued      08/17/21 1535    08/17/21 1528  Osmolality, Serum  STAT      08/17/21 1528    08/17/21 1524  sodium chloride 0.9 % bolus 500 mL  Once      08/17/21 1522    08/17/21 1522  calcium gluconate 1 g in sodium chloride 0.9 % 100 mL IVPB  Once      08/17/21 1520    08/17/21 1522  albuterol (PROVENTIL) nebulizer solution 0.083% 2.5 mg/3mL  Once Over 1 Hour      08/17/21 1520    08/17/21 1521  sodium bicarbonate injection 8.4% 50 mEq  Once      08/17/21 1519    08/17/21 1521  calcium gluconate injection 1 g  Once,   Status:  Discontinued      08/17/21 1519    08/17/21 1516  sodium chloride 0.9 % bolus 500 mL  Once      08/17/21 1514    08/17/21 1508  ondansetron (ZOFRAN) injection 4 mg  Once      08/17/21 1506    08/17/21 1456  CBC Auto Differential  Once      08/17/21 1455    08/17/21 1454  lactated ringers bolus 1,000 mL  Once,   Status:  Discontinued      08/17/21 1452    08/17/21 1453  XR Chest 1 View  1 Time Imaging      08/17/21 1452    08/17/21 1453  Monitor Blood Pressure  Per Hospital Policy      08/17/21 1452    08/17/21 1453  Cardiac Monitoring  Once,   Status:  Canceled      08/17/21 1452    08/17/21 1452  Insert peripheral IV  Once      08/17/21 1452    08/17/21 1452  CBC & Differential  Once       08/17/21 1452    08/17/21 1452  Magnesium  STAT      08/17/21 1452    08/17/21 1452  Comprehensive Metabolic Panel  Once      08/17/21 1452    08/17/21 1452  Urinalysis With Culture If Indicated - Urine, Clean Catch  Once      08/17/21 1452    08/17/21 1452  Troponin  Now Then Every 2 Hours      08/17/21 1452    08/17/21 1452  ECG 12 Lead  Once      08/17/21 1452    08/17/21 1451  sodium chloride 0.9 % flush 10 mL  As Needed      08/17/21 1452    08/17/21 1432  [place] Draw  STAT      08/17/21 1431    08/17/21 1432  Green Top (Gel)  PROCEDURE ONCE      08/17/21 1431    08/17/21 1432  Lavender Top  PROCEDURE ONCE      08/17/21 1431    08/17/21 1432  Red Top  PROCEDURE ONCE      08/17/21 1431    --  SCANNED - TELEMETRY        08/17/21 0000    Signed and Held  sodium chloride 0.9 % bolus 100 mL  As Needed      Signed and Held              Physician Progress Notes (last 72 hours) (Notes from 08/15/21 0754 through 08/18/21 0754)    No notes of this type exist for this encounter.            Consult Notes (last 48 hours) (Notes from 08/16/21 0754 through 08/18/21 0754)      Surjit Avitia MD at 08/17/21 1628          Nephrology ([facility]) Consult Note      Patient:  Ena Upton  YOB: 1970  Date of Service: 8/17/2021  MRN: [id number]   Acct: [id number]   Primary Care Physician: Norm Wiggins, GAEL  Advance Directive:   There are no questions and answers to display.     Admit Date: 8/17/2021       Hospital Day: 0  Referring Provider: Marcello Lagos MD      Patient personally seen and examined.  Complete chart including Consults, Notes, Operative Reports, Labs, Cardiology, and Radiology studies reviewed as able.        Subjective:  Ena Upton is a 51 y.o. female  whom we were consulted for end-stage renal disease.  Patient routinely receives her dialysis Tuesday Thursday Saturday.  Last treatment on Saturday without issue.  She had a doctor's appointment  scheduled for today so elected to not proceed with her routine dialysis on schedule.  She later developed weakness and presented to emergency room for evaluation.  She was found to have blood glucose greater than 1000 along with hyponatremia, hyperkalemia.  Patient admitted to the hospitalist service and we were consulted for the metabolic derangements.  Patient admitted to shortness of air and weakness.  Denied chest pain, nausea or vomiting.    Dialysis   Pt was seen on RRT at 1740  Modality: Hemodialysis  Access: Arterial Venous Fistula  Location: left upper  QB: 350  QD: 500  UF: 1300        Allergies:  Lamisil [terbinafine], Penicillins, Reglan [metoclopramide], and Stadol [butorphanol]    Home Meds:  (Not in a hospital admission)      Medicines:  Current Facility-Administered Medications   Medication Dose Route Frequency Provider Last Rate Last Admin   • dextrose (D50W) 25 g/ 50mL Intravenous Solution 25-50 mL  25-50 mL Intravenous Q30 Min PRN Marcello Lagos MD       • insulin regular (HumuLIN R,NovoLIN R) 100 Units in sodium chloride 0.9 % 100 mL (1 Units/mL) infusion  1-20 Units/hr Intravenous Titrated Marcello Lagos MD 4 mL/hr at 08/17/21 1546 4 Units/hr at 08/17/21 1546   • insulin regular (humuLIN R,novoLIN R) injection 7 Units  7 Units Intravenous TID AC Marcello Lagos MD       • sodium chloride 0.9 % flush 10 mL  10 mL Intravenous PRN Marcello Lagos MD         Current Outpatient Medications   Medication Sig Dispense Refill   • albuterol (PROVENTIL HFA;VENTOLIN HFA) 108 (90 BASE) MCG/ACT inhaler Inhale 2 puffs Every 4 (Four) Hours As Needed for wheezing.     • carvedilol (COREG) 12.5 MG tablet Take 1 tablet by mouth 2 (Two) Times a Day With Meals.     • cloNIDine (CATAPRES) 0.2 MG tablet Take 1 tablet by mouth Every 8 (Eight) Hours.     • DULoxetine (CYMBALTA) 60 MG capsule Take 60 mg by mouth daily.     • gabapentin (NEURONTIN) 300 MG capsule Take 1 capsule by mouth Every  Night for 3 days. 3 capsule 0   • hydrALAZINE (APRESOLINE) 100 MG tablet Take 1 tablet by mouth 3 (Three) Times a Day.     • insulin detemir (LEVEMIR) 100 UNIT/ML injection Inject 15 Units under the skin into the appropriate area as directed Daily.  12   • lactulose (CHRONULAC) 10 GM/15ML solution Take 20 g by mouth 3 (Three) Times a Day.     • levothyroxine (SYNTHROID, LEVOTHROID) 137 MCG tablet Take 1 tablet by mouth Daily.     • lisinopril (PRINIVIL,ZESTRIL) 20 MG tablet Take 20 mg by mouth 2 (two) times a day.     • NIFEdipine XL (PROCARDIA XL) 90 MG 24 hr tablet Take 1 tablet by mouth Daily.     • ondansetron (ZOFRAN) 4 MG tablet Take 4 mg by mouth Every 8 (Eight) Hours As Needed for nausea or vomiting.     • orphenadrine (NORFLEX) 100 MG 12 hr tablet Take 1 tablet by mouth Every 12 (Twelve) Hours for 3 days. 6 tablet 0   • rOPINIRole (REQUIP) 4 MG tablet Take 4 mg by mouth Every Night.     • senna (SENOKOT) 8.6 MG tablet tablet Take 1 tablet by mouth Every Night.     • simvastatin (ZOCOR) 20 MG tablet Take 20 mg by mouth Daily.         Past Medical History:  Past Medical History:   Diagnosis Date   • Anxiety    • Arthritis    • Chronic kidney disease     STAGE V RENAL FAILURE   • Depression    • Diabetes mellitus (CMS/HCC)    • HTN (hypertension)    • Hypothyroidism    • Obesity    • Tremors of nervous system        Past Surgical History:  Past Surgical History:   Procedure Laterality Date   • ARTERIOVENOUS FISTULA     • BREAST BIOPSY     • CARPAL TUNNEL RELEASE     • CHOLECYSTECTOMY     • TUBAL ABDOMINAL LIGATION         Family History  Family History   Problem Relation Age of Onset   • Cancer Other    • Hypertension Other    • Kidney disease Other    • Heart disease Other    • Diabetes Other    • Diabetes Mother    • Diabetes Maternal Aunt    • Diabetes Maternal Grandmother        Social History  Social History     Socioeconomic History   • Marital status:      Spouse name: Not on file   • Number of  "children: Not on file   • Years of education: Not on file   • Highest education level: Not on file   Tobacco Use   • Smoking status: Current Every Day Smoker     Packs/day: 0.50   • Smokeless tobacco: Never Used   • Tobacco comment: CUTTING BACK AND TRYING TO STOP SMOKING   Substance and Sexual Activity   • Alcohol use: No   • Drug use: No   • Sexual activity: Defer         Review of Systems:  History obtained from chart review and the patient  General ROS: No fever or chills  Respiratory ROS: +cough, shortness of breath  Cardiovascular ROS: No chest pain or palpitations  Gastrointestinal ROS: No abdominal pain or melena  Genito-Urinary ROS: No dysuria or hematuria  14 point ROS reviewed with the patient and negative except as noted above and in the HPI unless unable to obtain.    Objective:  Patient Vitals for the past 24 hrs:   BP Temp Temp src Pulse Resp SpO2 Height Weight   08/17/21 1616 155/90 -- -- 63 -- 100 % -- --   08/17/21 1601 127/58 -- -- 60 -- 100 % -- --   08/17/21 1553 -- -- -- 61 18 100 % -- --   08/17/21 1434 112/70 97.4 °F (36.3 °C) Oral -- -- -- -- --   08/17/21 1429 -- -- -- -- 18 -- -- --   08/17/21 1428 -- -- -- 58 -- 98 % 167.6 cm (66\") 63.5 kg (140 lb)       Intake/Output Summary (Last 24 hours) at 8/17/2021 1629  Last data filed at 8/17/2021 1623  Gross per 24 hour   Intake 1100 ml   Output --   Net 1100 ml     General: awake/alert   Chest:  Bilateral crackles  CVS: regular rate and rhythm  Abdominal: soft, nontender, positive bowel sounds  Extremities: ble edema  Skin: warm and dry without rash      Labs:  Results from last 7 days   Lab Units 08/17/21  1431   WBC 10*3/mm3 6.75   HEMOGLOBIN g/dL 8.8*   HEMATOCRIT % 25.8*   PLATELETS 10*3/mm3 137*         Results from last 7 days   Lab Units 08/17/21  1431   SODIUM mmol/L 110*   POTASSIUM mmol/L 7.3*   CHLORIDE mmol/L 72*   CO2 mmol/L 18.0*   BUN mg/dL 86*   CREATININE mg/dL 5.22*   CALCIUM mg/dL 8.9   BILIRUBIN mg/dL 0.6   ALK PHOS U/L 116 "   ALT (SGPT) U/L 8   AST (SGOT) U/L 12   GLUCOSE mg/dL 1,126*       Radiology:   Imaging Results (Last 72 Hours)     Procedure Component Value Units Date/Time    XR Chest 1 View [[id number]] Collected: 08/17/21 1608     Updated: 08/17/21 1615    Narrative:      EXAM: XR CHEST 1 VW-     HISTORY: weakness; R73.9-Hyperglycemia, unspecified; E87.5-Hyperkalemia;  E87.4-Qbpi-dsihmjjgrp and hyponatremia; R53.1-Weakness       COMPARISON: June 19, 2021.     TECHNIQUE: Frontal radiograph of the chest     FINDINGS:   Bilateral interstitial infiltrates are present. This has appearance of  pulmonary vascular congestion.. Cardiac silhouette is mildly enlarged.  Left subclavian endovascular stent graft is present..      The osseous structures and surrounding soft tissues demonstrate no acute  abnormality.          Impression:      1. Cardiomegaly with bilateral interstitial infiltrates most likely  representing mild or early changes of pulmonary vascular congestion.        This report was finalized on 08/17/2021 16:11 by Dr. Lazaro Martinez MD.          Culture:  No results found for: BLOODCX, URINECX, WOUNDCX, MRSACX, RESPCX, STOOLCX      Assessment   End-stage renal disease  Hypertension  Hyponatremia  Hyperkalemia  Anemia chronic kidney disease  Secondary hyperparathyroidism  Acute pulmonary edema    Plan:  Discussed with patient, nursing, primary  Work-up ordered and ongoing  Awaiting ICU bed for initiation of dialysis, nursing aware of critical nature  Monitor labs  Medically manage potassium until dialysis available    Thank you for the consult, we appreciate the opportunity to provide care to your patients.  Feel free to contact me if I can be of any further assistance.      Surjit Avitia MD  8/17/2021  16:29 CDT      Electronically signed by Surjit Avitia MD at 08/17/21 7450

## 2021-08-18 NOTE — PLAN OF CARE
Goal Outcome Evaluation:  Plan of Care Reviewed With: patient        Progress: improving   No c/o pain during shift. Inulin gtt off Per MD. Pt A&O x4. Multiple BM throughout night. Lab improvements. Safety maintained cont to monitor and contact MD as needed.

## 2021-08-18 NOTE — PLAN OF CARE
Goal Outcome Evaluation:  Plan of Care Reviewed With: patient           Outcome Summary: OT eval completed. Pt is alert and oriented x4, seated in chair upon entry to room. Pt reports that at baseline she is independent with all mobility and ADLs. Today, she presents with decreased strength, balance, activity tolerance, sensation and increased pain. Pt incontinent of bowel during session required Max A for perineal hygiene, clothing mgt. Min A for donning/doffing gown. Max A for donning/doffing socks. Min A for sit <> stand t/f from chair, complete x4 reps and for small steps in room with rwx. Pt would benefit from skilled OT services to address these deficits. Pt is at a high risk for falls at this time and she reports that she has had four falls since d/c from SNF earlier this month. Recommend d/c back to SNF for continued therapy services.

## 2021-08-18 NOTE — PLAN OF CARE
Goal Outcome Evaluation:  Plan of Care Reviewed With: patient        Progress: no change  Outcome Summary: PATIENT TRANSFERRED FROM ICU TO  445. ALERT AND ORIENTED, NO C/O PAIN. SATS WNL ON RA. PATIENT HAD DIALYSIS TODAY, 3 LITERS REMOVED. BG IMPROVED. LOOSE BM. UP IN CHAIR. CONT TO MONITOR.

## 2021-08-18 NOTE — NURSING NOTE
Pt BG began to drop at 0030 MD notified, orders to drop Insulin gtt from 11 to 2 and give 1 Amp of D50. MD notified again at 0300 for BG >60 D5 0.45 NS started insulin gtt infusing. Waiting on labs. 0000 labs had to be recollected.

## 2021-08-18 NOTE — CASE MANAGEMENT/SOCIAL WORK
Discharge Planning Assessment  [facility]     Patient Name: Ena Upton  MRN: [id number]  Today's Date: 8/18/2021    Admit Date: 8/17/2021    Discharge Needs Assessment     Row Name 08/18/21 0942       Living Environment    Lives With  alone    Current Living Arrangements  home/apartment/condo    Primary Care Provided by  spouse/significant other    Provides Primary Care For  no one    Family Caregiver if Needed  none    Able to Return to Prior Arrangements  yes       Resource/Environmental Concerns    Resource/Environmental Concerns  none       Transition Planning    Patient/Family Anticipates Transition to  home with family    Patient/Family Anticipated Services at Transition  none    Transportation Anticipated  family or friend will provide       Discharge Needs Assessment    Readmission Within the Last 30 Days  no previous admission in last 30 days    Discharge Coordination/Progress  spoke to patient; lives alone has sister Kassandra and significant other to assist her; has PCP and RX coverage; states scheduled for back surgery 8/30 with Dr. Thompson requesting BSC and Walker; patient  wants to be admtted to NH for therapy the Friday prior to surgery I instructed patient to ask Dr. Thompson to admit her to NH. will follow for DC needs        Discharge Plan    No documentation.       Continued Care and Services - Admitted Since 8/17/2021    Coordination has not been started for this encounter.     Selected Continued Care - Prior Encounters Includes selections from prior encounters from 5/19/2021 to 8/18/2021    Discharged on 6/29/2021 Admission date: 6/19/2021 - Discharge disposition: Skilled Nursing Facility (DC - External)    Destination     Service Provider Selected Services Address Phone Fax Patient Preferred    [facility]  Skilled Nursing [address] [place] [address] [phone] --                      Demographic Summary    No documentation.       Functional Status    No documentation.        Psychosocial    No documentation.       Abuse/Neglect    No documentation.       Legal    No documentation.       Substance Abuse    No documentation.       Patient Forms    No documentation.           Priya Miller RN

## 2021-08-19 NOTE — THERAPY TREATMENT NOTE
Acute Care - Physical Therapy Treatment Note  [facility]     Patient Name: Ena Upton  : 1970  MRN: [id number]  Today's Date: 2021      Visit Dx:     ICD-10-CM ICD-9-CM   1. Hyperglycemia  R73.9 790.29   2. Hyperkalemia  E87.5 276.7   3. Hyponatremia  E87.1 276.1   4. Generalized weakness  R53.1 780.79   5. Impaired mobility  Z74.09 799.89   6. 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)   • Non-ketotic hyperosmolar coma (CMS/HCC)   • HTN (hypertension)   • Intractable nausea and vomiting   • Constipation   • Gastroparesis due to DM (CMS/HCC)   • Hyperglycemia   • Seizure (CMS/HCC)   • End stage kidney disease (CMS/HCC)   • Sacral insufficiency fracture   • Hypertensive urgency   • Uncontrolled diabetes mellitus (CMS/HCC)   • Hyperkalemia     Past Medical History:   Diagnosis Date   • Anxiety    • Arthritis    • Chronic kidney disease     STAGE V RENAL FAILURE   • Depression    • Diabetes mellitus (CMS/HCC)    • HTN (hypertension)    • Hypothyroidism    • Obesity    • Tremors of nervous system      Past Surgical History:   Procedure Laterality Date   • ARTERIOVENOUS FISTULA     • BREAST BIOPSY     • CARPAL TUNNEL RELEASE     • CHOLECYSTECTOMY     • TUBAL ABDOMINAL LIGATION          PT Assessment (last 12 hours)      PT Evaluation and Treatment     Row Name 21 1500 21 1438       Physical Therapy Time and Intention    Subjective Information  complains of;weakness;fatigue;pain  -JENNIFER  --    Document Type  therapy note (daily note)  -JENNIFER  therapy note (daily note)  -    Mode of Treatment  physical therapy  -JENNIFER  --    Session Not Performed  --  other (see comments)  -    Comment, Session Not Performed  --  pt working with OT. WIll check back.   -    Row Name 21 0983          Physical Therapy Time and Intention    Session Not Performed  patient unavailable for treatment   -     Comment, Session Not Performed  pt gone for dialysis  -     Row Name 08/19/21 1500          General Information    Existing Precautions/Restrictions  fall  -JENNIFER     Row Name 08/19/21 1500          Pain Scale: Word Pre/Post-Treatment    Pain: Word Scale, Pretreatment  4 - moderate pain  -JENNIFER     Posttreatment Pain Rating  4 - moderate pain  -JENNIFER     Pain Location - Side  Left  -JENNIFER     Pain Location  back  -JENNIFER     Pre/Posttreatment Pain Comment  movement in LLE increases pain, pt stated she has plans for back sx  on 8/30   -JENNIFER     Row Name 08/19/21 1500          Bed Mobility    Comment (Bed Mobility)  in the chair   -JENNIFER     Row Name 08/19/21 1500          Transfers    Sit-Stand [place] (Transfers)  verbal cues;contact guard  -JENNIFER     Row Name 08/19/21 1500          Sit-Stand Transfer    Assistive Device (Sit-Stand Transfers)  walker, front-wheeled  -JENNIFER     Row Name 08/19/21 1500          Gait/Stairs (Locomotion)    [place] Level (Gait)  verbal cues;contact guard  -JENNIFER     Assistive Device (Gait)  walker, front-wheeled  -JENNIFER     Distance in Feet (Gait)  5', 12' with sitting rest in between   -JENNIFER     Deviations/Abnormal Patterns (Gait)  flavio decreased;stride length decreased  -JENNIFER     Comment (Gait/Stairs)  pt stated that she felt extremely weak and could not amb further, did not display weakness with amb.   -     Row Name 08/19/21 1500          Motor Skills    Therapeutic Exercise  aerobic  -JENNIFER     Row Name 08/19/21 1500          Aerobic Exercise    Comment, Aerobic Exercise (Therapeutic Exercise)  AROM RLE X 20 , LLE X 10 while sitting in the chair   -JENNIFER     Row Name 08/19/21 1500          Positioning and Restraints    Pre-Treatment Position  sitting in chair/recliner  -JENNIFER     Post Treatment Position  chair  -JENNIFER     In Chair  sitting;call light within reach;encouraged to call for assist  -JENNIFER       User Key  (r) = Recorded By, (t) = Taken By, (c) = Cosigned By    Initials Name Provider Type    JENNIFER  Jose Parker PTA Physical Therapy Assistant     Amada Paige PTA Physical Therapy Assistant        Physical Therapy Education                 Title: PT OT SLP Therapies (In Progress)     Topic: Physical Therapy (In Progress)     Point: Mobility training (Done)     Learning Progress Summary           Patient Acceptance, BULMARO ENGLISH DU by  at 8/18/2021 3043    Comment: Pt educated on safety with mobility. Pt educated to not perform mobility by herself due to decreased sensation in the LE.                   Point: Home exercise program (Not Started)     Learner Progress:  Not documented in this visit.          Point: Body mechanics (Not Started)     Learner Progress:  Not documented in this visit.          Point: Precautions (Not Started)     Learner Progress:  Not documented in this visit.                      User Key     Initials Effective Dates Name Provider Type Discipline     08/04/21 -  Elinor Lovell PT Student PT Student PT              PT Recommendation and Plan     Plan of Care Reviewed With: patient  Progress: improving  Outcome Summary: Pt was sitting in the chair, eager to participate with therapy.  Transfered sit to stand with CGA.  Amb 15' with RWX and CGA.  Performed LE exercises AROM in the chair.  Will continue to work with pt to increase strength and progress amb.       Time Calculation:   PT Charges     Row Name 08/19/21 1500             Time Calculation    Start Time  1500  -JENNIFER      Stop Time  1516  -JENNIFER      Time Calculation (min)  16 min  -JENNIFER      PT Received On  08/19/21  -JENNIFER         Time Calculation- PT    Total Timed Code Minutes- PT  16 minute(s)  -JENNIFER         Timed Charges    29780 - Gait Training Minutes   16  -JENNIFER         Total Minutes    Timed Charges Total Minutes  16  -JENNIFER       Total Minutes  16  -JENNIFER        User Key  (r) = Recorded By, (t) = Taken By, (c) = Cosigned By    Initials Name Provider Type    Jose Mo PTA Physical Therapy Assistant        Therapy  Charges for Today     Code Description Service Date Service Provider Modifiers Qty    [id number] HC GAIT TRAINING EA 15 MIN 8/19/2021 Jose Parker, PTA GP 1          PT G-Codes  Outcome Measure Options: AM-PAC 6 Clicks Daily Activity (OT)  AM-PAC 6 Clicks Score (PT): 17  AM-PAC 6 Clicks Score (OT): 17    Jose Parker PTA  8/19/2021

## 2021-08-19 NOTE — CASE MANAGEMENT/SOCIAL WORK
Continued Stay Note  [facility]     Patient Name: Ena Upton  MRN: [id number]  Today's Date: 8/19/2021    Admit Date: 8/17/2021    Discharge Plan     Row Name 08/19/21 1003       Plan    Plan  SNF    Patient/Family in Agreement with Plan  yes    Plan Comments  Pt is down for HD however, according to chart she is wanting to return to [facility] where she was recently dc from.  CYNDY has left a voicemail for Jenny in admissions to see if pt can return.  Pt already goes to outpt HD [facility].  SW will follow.        Discharge Codes    No documentation.             DUSTIN EstrellaW

## 2021-08-19 NOTE — PLAN OF CARE
Goal Outcome Evaluation:           Progress: no change   SBP elevated, MD notified. Sinus 60-74 on tele. AAOX4. Continues glucose monitoring. Inulin given per parameter orders. No C/O pain. Continues on room air with saturation in the 90's. Medication education provided. Safety maintained.

## 2021-08-19 NOTE — PROGRESS NOTES
[facility] Medicine Services  INPATIENT PROGRESS NOTE    Length of Stay: 2  Date of Admission: 8/17/2021  Primary Care Physician: Norm Wiggins APRN    Subjective   Chief Complaint: Weakness/hyperkalemia/dialysis    HPI   Hyperkalemia, plan for dialysis today.  Blood sugars still fluctuate a lot.  Patient denies any chest pain.  Patient no acute stress.  Patient not requiring oxygen.  Patient still remain weak    Review of Systems   Constitutional: Positive for activity change, appetite change and fatigue. Negative for chills and fever.   HENT: Negative for hearing loss, nosebleeds, tinnitus and trouble swallowing.    Eyes: Negative for visual disturbance.   Respiratory: Negative for cough, chest tightness, shortness of breath and wheezing.    Cardiovascular: Negative for chest pain, palpitations and leg swelling.   Gastrointestinal: Negative for abdominal distention, abdominal pain, blood in stool, constipation, diarrhea, nausea and vomiting.   Endocrine: Negative for cold intolerance, heat intolerance, polydipsia, polyphagia and polyuria.   Genitourinary: Negative for decreased urine volume, difficulty urinating, dysuria, flank pain, frequency and hematuria.   Musculoskeletal: Positive for arthralgias, gait problem and myalgias. Negative for joint swelling.   Skin: Negative for rash.   Allergic/Immunologic: Negative for immunocompromised state.   Neurological: Positive for weakness. Negative for dizziness, syncope, light-headedness and headaches.   Hematological: Negative for adenopathy. Does not bruise/bleed easily.   Psychiatric/Behavioral: Negative for confusion and sleep disturbance. The patient is not nervous/anxious.      All pertinent negatives and positives are as above. All other systems have been reviewed and are negative unless otherwise stated.     Objective    Temp:  [98 °F (36.7 °C)-98.5 °F (36.9 °C)] 98.5 °F (36.9 °C)  Heart Rate:  [61-69] 66  Resp:  [16-18]  16  BP: ()/(57-90) 189/85    Intake/Output Summary (Last 24 hours) at 8/19/2021 1003  Last data filed at 8/18/2021 1718  Gross per 24 hour   Intake 480 ml   Output 3000 ml   Net -2520 ml     Physical Exam  Vitals and nursing note reviewed.   Constitutional:       Appearance: She is well-developed.   HENT:      Head: Normocephalic.   Eyes:      Conjunctiva/sclera: Conjunctivae normal.      Pupils: Pupils are equal, round, and reactive to light.   Neck:      Vascular: No JVD.   Cardiovascular:      Rate and Rhythm: Normal rate and regular rhythm.      Heart sounds: Normal heart sounds. No murmur heard.   No friction rub. No gallop.    Pulmonary:      Effort: No respiratory distress.      Breath sounds: No wheezing or rales.      Comments: Diminished breath sound, clear .  Chest:      Chest wall: No tenderness.   Abdominal:      General: Bowel sounds are normal. There is no distension.      Palpations: Abdomen is soft.      Tenderness: There is no abdominal tenderness. There is no guarding or rebound.   Musculoskeletal:         General: No tenderness or deformity. Normal range of motion.      Cervical back: Neck supple.   Skin:     General: Skin is warm and dry.      Capillary Refill: Capillary refill takes 2 to 3 seconds.      Findings: No rash.   Neurological:      General: No focal deficit present.      Mental Status: She is alert and oriented to person, place, and time.      Cranial Nerves: No cranial nerve deficit.      Motor: Weakness present. No abnormal muscle tone.      Coordination: Coordination abnormal.      Gait: Gait abnormal.      Deep Tendon Reflexes: Reflexes normal.   Psychiatric:         Behavior: Behavior normal.         Thought Content: Thought content normal.      Results Review:  Lab Results (last 24 hours)     Procedure Component Value Units Date/Time    POC Glucose Once [[id number]]  (Abnormal) Collected: 08/19/21 0751    Specimen: Blood Updated: 08/19/21 0827     Glucose 339 mg/dL       Comment: : [id number] Christopher Redman ID: [id number]       Comprehensive Metabolic Panel [[id number]]  (Abnormal) Collected: 08/19/21 0430    Specimen: Blood Updated: 08/19/21 0523     Glucose 295 mg/dL      BUN 29 mg/dL      Creatinine 3.18 mg/dL      Sodium 133 mmol/L      Potassium 5.3 mmol/L      Chloride 94 mmol/L      CO2 26.0 mmol/L      Calcium 8.6 mg/dL      Total Protein 7.0 g/dL      Albumin 3.80 g/dL      ALT (SGPT) 9 U/L      AST (SGOT) 16 U/L      Alkaline Phosphatase 102 U/L      Total Bilirubin 0.3 mg/dL      eGFR Non African Amer 15 mL/min/1.73      Globulin 3.2 gm/dL      A/G Ratio 1.2 g/dL      BUN/Creatinine Ratio 9.1     Anion Gap 13.0 mmol/L     Narrative:      GFR Normal >60  Chronic Kidney Disease <60  Kidney Failure <15      CBC & Differential [[id number]]  (Abnormal) Collected: 08/19/21 0430    Specimen: Blood Updated: 08/19/21 0505    Narrative:      The following orders were created for panel order CBC & Differential.  Procedure                               Abnormality         Status                     ---------                               -----------         ------                     CBC Auto Differential[[id number]]        Abnormal            Final result                 Please view results for these tests on the individual orders.    CBC Auto Differential [[id number]]  (Abnormal) Collected: 08/19/21 0430    Specimen: Blood Updated: 08/19/21 0505     WBC 4.76 10*3/mm3      RBC 2.93 10*6/mm3      Hemoglobin 8.4 g/dL      Hematocrit 26.4 %      MCV 90.1 fL      MCH 28.7 pg      MCHC 31.8 g/dL      RDW 14.4 %      RDW-SD 46.5 fl      MPV 10.3 fL      Platelets 156 10*3/mm3      Neutrophil % 54.2 %      Lymphocyte % 23.9 %      Monocyte % 14.3 %      Eosinophil % 6.1 %      Basophil % 1.3 %      Immature Grans % 0.2 %      Neutrophils, Absolute 2.58 10*3/mm3      Lymphocytes, Absolute 1.14 10*3/mm3      Monocytes, Absolute 0.68 10*3/mm3      Eosinophils, Absolute 0.29 10*3/mm3       Basophils, Absolute 0.06 10*3/mm3      Immature Grans, Absolute 0.01 10*3/mm3      nRBC 0.0 /100 WBC     POC Glucose Once [[id number]]  (Normal) Collected: 08/18/21 2210    Specimen: Blood Updated: 08/18/21 2223     Glucose 119 mg/dL      Comment: : [id number] [employer] JenniferMeter ID: [id number]       POC Glucose Once [[id number]]  (Abnormal) Collected: 08/18/21 2026    Specimen: Blood Updated: 08/18/21 2050     Glucose 199 mg/dL      Comment: : [id number] [employer] JenniferMeter ID: [id number]       POC Glucose Once [[id number]]  (Abnormal) Collected: 08/18/21 1512    Specimen: Blood Updated: 08/18/21 1523     Glucose 164 mg/dL      Comment: : [id number] [employer] CieraMeter ID: [id number]              Cultures:  No results found for: BLOODCX, URINECX, WOUNDCX, MRSACX, RESPCX, STOOLCX    Radiology Data:    Imaging Results (Last 24 Hours)     ** No results found for the last 24 hours. **          Allergies   Allergen Reactions   • Lamisil [Terbinafine]    • Penicillins    • Reglan [Metoclopramide] GI Intolerance   • Stadol [Butorphanol] Nausea And Vomiting       Scheduled meds:   atorvastatin, 10 mg, Oral, Daily  carvedilol, 12.5 mg, Oral, BID With Meals  cloNIDine, 0.2 mg, Oral, Q8H  DULoxetine, 30 mg, Oral, Daily  famotidine, 20 mg, Oral, Daily  gabapentin, 300 mg, Oral, Nightly  hydrALAZINE, 50 mg, Oral, TID  insulin detemir, 5 Units, Subcutaneous, Daily  insulin lispro, 2-7 Units, Subcutaneous, 4x Daily With Meals & Nightly  lisinopril, 20 mg, Oral, Q12H  nicotine, 1 patch, Transdermal, Q24H  NIFEdipine XL, 90 mg, Oral, Q24H  rOPINIRole, 4 mg, Oral, Nightly  senna, 1 tablet, Oral, Nightly  sodium chloride, 10 mL, Intravenous, Q12H  sodium polystyrene, 15 g, Oral, Once        PRN meds:  •  albuterol  •  dextrose  •  dextrose  •  glucagon (human recombinant)  •  ondansetron  •  [COMPLETED] Insert peripheral IV **AND** sodium chloride  •  sodium chloride    Assessment/Plan       Intractable nausea and vomiting     Gastroparesis due to DM (CMS/HCC)    Hyperglycemia    End stage kidney disease (CMS/HCC)    Uncontrolled diabetes mellitus (CMS/[facility])    Hyperkalemia      Plan:  DM/Hyperglycemia.  Sugar stable.  Hemoglobin A1c 10.6.  Sliding scale.  Levemir.     Chronic renal failure on dialysis.    Nephrology consult.  Dialysis per nephrology.     Hyperkalemia.     Plan for dialysis.     Nausea/vomiting.  Pepcid.  Zofran.     Hypertension/hyperlipidemia.  Coreg.  Catapres.  Hydralazine.  Hold lisinopril due to hyperkalemia.  Procardia.  Zocor.     COPD.  Albuterol nebulizer as needed.  Chest x-ray-Cardiomegaly with bilateral interstitial infiltrates most likely representing mild or early changes of pulmonary vascular congestion.     Anemia.    Hemoglobin stable. No sign of acute bleed.  Erythropoietin.  Anemia panel.     TSH elevated.  Free T4-normal     Anxiety/depression.  Cymbalta.     Neuropathy.  Neurontin.  Requip at night.     Chronic smoker.  Nicotine patch.  Discussed patient cutting back and stopping.     Constipation . Lactulose.  Senokot.     Hypothyroidism.  Synthroid.     Deconditioning.  PT and OT consult.     Covid-19-negative.     Discharge Plannin-4 days.  Patient refused rehab.  Patient go home with home health and a wheelchair.  Patient already have a walker at home.    Electronically signed by Narinder Madrid MD, 21, 10:03 AM CDT.

## 2021-08-19 NOTE — PLAN OF CARE
Goal Outcome Evaluation:  Plan of Care Reviewed With: patient        Progress: improving  Outcome Summary: VSS. Pt c/o hip pain after dialysis, medicated with PRN Norco with relief. Patient had 3 liters out today. BP was high this morning and during dialysis. Improved this afternoon after medications given. Cont to monitor overnight

## 2021-08-19 NOTE — PLAN OF CARE
Goal Outcome Evaluation:  Plan of Care Reviewed With: patient        Progress: improving  Outcome Summary: Pt was sitting in the chair, eager to participate with therapy.  Transfered sit to stand with CGA.  Amb 15' with RWX and CGA.  Performed LE exercises AROM in the chair.  Will continue to work with pt to increase strength and progress amb.

## 2021-08-19 NOTE — NURSING NOTE
"At 0330 this morning the patient's BP manually was 194/86.  Secure chat was sent to Dr. Moon. No response to secure chat. jessee Ho RN notified and suggested giving the patient her clonidine early. Clonidine given to patient at 0358. BP rechecked manually at 0531 and it was 220/90. Dr. Moon paged. No response. Break room called at 0537 with no response. Dr. Moon paged again at 0557. No response. Coreg medication given early due to high SBP at 0545. wilton Armando supervisor notified at 0604 . Dr. Moon called back at 0614 this morning. No new orders. Dr. Moon wanted to \"see if the Coreg works\". Safety maintained.   "

## 2021-08-19 NOTE — CASE MANAGEMENT/SOCIAL WORK
Continued Stay Note  [facility]     Patient Name: Ena Upton  MRN: [id number]  Today's Date: 8/19/2021    Admit Date: 8/17/2021    Discharge Plan     Row Name 08/19/21 1006       Plan    Plan  SNF    Patient/Family in Agreement with Plan  yes    Plan Comments  [facility] cannot accept pt back due to her leaving AMA last admission.  SW will speak with pt after HD to determine where else she wants referrals made.    Row Name 08/19/21 1003       Plan    Plan  SNF    Patient/Family in Agreement with Plan  yes    Plan Comments  Pt is down for HD however, according to chart she is wanting to return to [facility] where she was recently dc from.  SW has left a voicemail for Jenny in admissions to see if pt can return.  Pt already goes to outpt HD [facility].  SW will follow.        Discharge Codes    No documentation.             DUSTIN EstrellaW

## 2021-08-19 NOTE — THERAPY TREATMENT NOTE
Acute Care - Occupational Therapy Treatment Note  [facility]     Patient Name: Ena Upton  : 1970  MRN: [id number]  Today's Date: 2021             Admit Date: 2021       ICD-10-CM ICD-9-CM   1. Hyperglycemia  R73.9 790.29   2. Hyperkalemia  E87.5 276.7   3. Hyponatremia  E87.1 276.1   4. Generalized weakness  R53.1 780.79   5. Impaired mobility  Z74.09 799.89   6. 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)   • Non-ketotic hyperosmolar coma (CMS/HCC)   • HTN (hypertension)   • Intractable nausea and vomiting   • Constipation   • Gastroparesis due to DM (CMS/HCC)   • Hyperglycemia   • Seizure (CMS/HCC)   • End stage kidney disease (CMS/HCC)   • Sacral insufficiency fracture   • Hypertensive urgency   • Uncontrolled diabetes mellitus (CMS/HCC)   • Hyperkalemia     Past Medical History:   Diagnosis Date   • Anxiety    • Arthritis    • Chronic kidney disease     STAGE V RENAL FAILURE   • Depression    • Diabetes mellitus (CMS/HCC)    • HTN (hypertension)    • Hypothyroidism    • Obesity    • Tremors of nervous system      Past Surgical History:   Procedure Laterality Date   • ARTERIOVENOUS FISTULA     • BREAST BIOPSY     • CARPAL TUNNEL RELEASE     • CHOLECYSTECTOMY     • TUBAL ABDOMINAL LIGATION              OT ASSESSMENT FLOWSHEET (last 12 hours)      OT Evaluation and Treatment     Row Name 21 1430                   OT Time and Intention    Subjective Information  complains of;weakness;fatigue hungry!  -        Document Type  therapy note (daily note)  -        Mode of Treatment  occupational therapy  -        Patient Effort  good  -           General Information    Existing Precautions/Restrictions  fall  -           Pain Assessment    Additional Documentation  Pain Scale: Word Pre/Post-Treatment (Group)  -           Pain Scale: Numbers Pre/Post-Treatment    Pain  Intervention(s)  Repositioned;Rest  -CJ           Bed Mobility    Comment (Bed Mobility)  up in chair!  -CJ           Motor Skills    Motor Skills  therapeutic exercise  -        Therapeutic Exercise  shoulder;elbow/forearm  -CJ           Shoulder (Therapeutic Exercise)    Shoulder (Therapeutic Exercise)  AROM (active range of motion);strengthening exercise  -CJ        Shoulder AROM (Therapeutic Exercise)  bilateral;flexion;extension;sitting;10 repetitions;2 sets  -CJ        Shoulder Strengthening (Therapeutic Exercise)  bilateral;flexion;extension;sitting;2 lb free weight;3 lb free weight;10 repetitions;2 sets  -CJ           Elbow/Forearm (Therapeutic Exercise)    Elbow/Forearm (Therapeutic Exercise)  AROM (active range of motion);strengthening exercise  -CJ        Elbow/Forearm AROM (Therapeutic Exercise)  bilateral;flexion;extension;sitting;10 repetitions;2 sets  -CJ        Elbow/Forearm Strengthening (Therapeutic Exercise)  bilateral;flexion;extension;sitting;2 lb free weight;3 lb free weight;10 repetitions;2 sets  -CJ           Positioning and Restraints    Pre-Treatment Position  sitting in chair/recliner  -CJ        Post Treatment Position  chair  -CJ        In Chair  sitting;call light within reach;encouraged to call for assist  -           Progress Summary (OT)    Progress Toward Functional Goals (OT)  progress toward functional goals is good  -        Barriers to Overall Progress (OT)  Fall/hungry!  -CJ        Impairments Still Limiting Function (OT)  continue with ot poc!  -CJ          User Key  (r) = Recorded By, (t) = Taken By, (c) = Cosigned By    Initials Name Effective Dates    CJ Zack Tafoya COTA 06/16/21 -            Occupational Therapy Education                 Title: PT OT SLP Therapies (In Progress)     Topic: Occupational Therapy (Done)     Point: ADL training (Done)     Description:   Instruct learner(s) on proper safety adaptation and remediation techniques during self care or  transfers.   Instruct in proper use of assistive devices.              Learning Progress Summary           Patient Acceptance, E, VU by  at 8/18/2021 1544                   Point: Home exercise program (Done)     Description:   Instruct learner(s) on appropriate technique for monitoring, assisting and/or progressing therapeutic exercises/activities.              Learning Progress Summary           Patient Acceptance, E,TB, VU,DU,NR by  at 8/19/2021 1430    Comment: Pt. sitting in chair, performed ue exs to increase her act. xochilt and adl performance!                   Point: Precautions (Done)     Description:   Instruct learner(s) on prescribed precautions during self-care and functional transfers.              Learning Progress Summary           Patient Acceptance, E,TB, VU,DU,NR by  at 8/19/2021 1430    Comment: Pt. sitting in chair, performed ue exs to increase her act. xochilt and adl performance!    Acceptance, E, VU by  at 8/18/2021 1544                   Point: Body mechanics (Done)     Description:   Instruct learner(s) on proper positioning and spine alignment during self-care, functional mobility activities and/or exercises.              Learning Progress Summary           Patient Acceptance, E,TB, VU,DU,NR by  at 8/19/2021 1430    Comment: Pt. sitting in chair, performed ue exs to increase her act. xochilt and adl performance!                               User Key     Initials Effective Dates Name Provider Type Discipline     06/16/21 -  Zack Tafoya COTA Occupational Therapy Assistant OT     06/16/21 -  Desire Keita OTR/L Occupational Therapist OT                  OT Recommendation and Plan     Progress Toward Functional Goals (OT): progress toward functional goals is good  Plan of Care Review  Plan of Care Reviewed With: patient  Progress: improving  Outcome Summary: No problems with pt. performing ue exs to increase her act. xochilt and adl tasks!  Plan of Care Reviewed With:  patient  Outcome Summary: No problems with pt. performing ue exs to increase her act. xochilt and adl tasks!    Outcome Measures     Row Name 08/19/21 1430             How much help from another is currently needed...    Putting on and taking off regular lower body clothing?  2  -CJ      Bathing (including washing, rinsing, and drying)  2  -CJ      Toileting (which includes using toilet bed pan or urinal)  2  -CJ      Putting on and taking off regular upper body clothing  3  -CJ      Taking care of personal grooming (such as brushing teeth)  4  -CJ      Eating meals  4  -CJ      AM-PAC 6 Clicks Score (OT)  17  -CJ         Functional Assessment    Outcome Measure Options  AM-PAC 6 Clicks Daily Activity (OT)  -CJ        User Key  (r) = Recorded By, (t) = Taken By, (c) = Cosigned By    Initials Name Provider Type    Zack Whipple COTA Occupational Therapy Assistant          Time Calculation:   Time Calculation- OT     Row Name 08/19/21 1500 08/19/21 1430          Time Calculation- OT    OT Start Time  --  1430  -     OT Stop Time  --  1458  -     OT Time Calculation (min)  --  28 min  -     Total Timed Code Minutes- OT  --  28 minute(s)  -     OT Received On  --  08/19/21  -        Timed Charges    60220 - OT Therapeutic Exercise Minutes  --  28  -CJ     37570 - Gait Training Minutes   16  -JENNIFER  --        Total Minutes    Timed Charges Total Minutes  16  -JENNIFER  28  -      Total Minutes  16  -JENNIFER  28  -CJ       User Key  (r) = Recorded By, (t) = Taken By, (c) = Cosigned By    Initials Name Provider Type     Zack Tafoya COTA Occupational Therapy Assistant    Jose Mo, PTA Physical Therapy Assistant        Therapy Charges for Today     Code Description Service Date Service Provider Modifiers Qty    [id number] HC OT THER PROC EA 15 MIN 8/19/2021 Zack Tafoya COTA GO 2               RACHEL Santiago  8/19/2021

## 2021-08-19 NOTE — DISCHARGE PLACEMENT REQUEST
"Chemo Upton (51 y.o. Female)     Date of Birth Social Security Number Address Home Phone MRN    1970  [id number] [id number] [phone] [id number]    Confucianist Marital Status          Other        Admission Date Admission Type Admitting Provider Attending Provider Department, Room/Bed    8/17/21 Emergency Narinder Madrid MD Truong, Khai C, MD [address], 445[address]    Discharge Date Discharge Disposition Discharge Destination                       Attending Provider: Narinder Madrid MD    Allergies: Lamisil [Terbinafine], Penicillins, Reglan [Metoclopramide], Stadol [Butorphanol]    Isolation: None   Infection: None   Code Status: CPR    Ht: 170.2 cm (67.01\")   Wt: 61.2 kg (134 lb 14.4 oz)    Admission Cmt: None   Principal Problem: None                Active Insurance as of 8/17/2021     Primary Coverage     Payor Plan Insurance Group Employer/Plan Group    [facility] MEDICARE REPLACEMENT [facility] MEDICARE REPLACEMENT      Payor Plan Address Payor Plan Phone Number Payor Plan Fax Number Effective Dates    [address] [address] [phone]  6/1/2021 - None Entered    [facility] [id number]       Subscriber Name Subscriber Birth Date Member ID       CHEMO UPTON 1970 [id number]                 Emergency Contacts      (Rel.) Home Phone Work Phone Mobile Phone    Dmitriy Galvez (Friend) -- -- [phone]    Kassandra Paez (Sister) [phone] -- [phone]               History & Physical      Narinder Madrid MD at 08/17/21 1617              [facility] Medicine Services  HISTORY AND PHYSICAL    Date of Admission: 8/17/2021  Primary Care Physician: Norm Wiggins APRN    Subjective     Chief Complaint: Hyperkalemia.  Renal failure on dialysis.  Hyperglycemia.    History of Present Illness  Patient is a 51-year-old  female came to the ER for evaluation of hyperglycemia and weakness.  Patient has missed his dialysis " appointment.  ER physician discussed plan for dialysis today.  Patient is a chronic diabetes, glucose was in the thousand in ER.  Patient will have dialysis Tuesday Thursday and Saturday.  Patient missed Tuesday only.  Patient states she feels weak all day long and check her sugar is too high to read.  Patient was so weak to get around family proceeded to call 911.  Patient was brought to ER to be evaluated.  Patient is on chronic dialysis.    Review of Systems   Constitutional: Positive for activity change, appetite change and fatigue. Negative for chills and fever.   HENT: Negative for hearing loss, nosebleeds, tinnitus and trouble swallowing.    Eyes: Negative for visual disturbance.   Respiratory: Positive for shortness of breath and wheezing. Negative for cough and chest tightness.    Cardiovascular: Positive for leg swelling. Negative for chest pain and palpitations.   Gastrointestinal: Negative for abdominal distention, abdominal pain, blood in stool, constipation, diarrhea, nausea and vomiting.   Endocrine: Negative for cold intolerance, heat intolerance, polydipsia, polyphagia and polyuria.   Genitourinary: Negative for decreased urine volume, difficulty urinating, dysuria, flank pain, frequency and hematuria.   Musculoskeletal: Negative for arthralgias, joint swelling and myalgias.   Skin: Negative for rash.   Allergic/Immunologic: Negative for immunocompromised state.   Neurological: Positive for weakness. Negative for dizziness, syncope, light-headedness and headaches.   Hematological: Negative for adenopathy. Does not bruise/bleed easily.   Psychiatric/Behavioral: Negative for confusion and sleep disturbance. The patient is not nervous/anxious.         Otherwise complete ROS reviewed and negative except as mentioned in the HPI.      Past Medical History:   Past Medical History:   Diagnosis Date   • Anxiety    • Arthritis    • Chronic kidney disease     STAGE V RENAL FAILURE   • Depression    • Diabetes  mellitus (CMS/HCC)    • HTN (hypertension)    • Hypothyroidism    • Obesity    • Tremors of nervous system        Past Surgical History:  Past Surgical History:   Procedure Laterality Date   • ARTERIOVENOUS FISTULA     • BREAST BIOPSY     • CARPAL TUNNEL RELEASE     • CHOLECYSTECTOMY     • TUBAL ABDOMINAL LIGATION         Family History: family history includes Cancer in an other family member; Diabetes in her maternal aunt, maternal grandmother, mother, and another family member; Heart disease in an other family member; Hypertension in an other family member; Kidney disease in an other family member.    Social History:  reports that she has been smoking. She has been smoking about 0.50 packs per day. She has never used smokeless tobacco. She reports that she does not drink alcohol and does not use drugs.    Medications:  Prior to Admission medications    Medication Sig Start Date End Date Taking? Authorizing Provider   albuterol (PROVENTIL HFA;VENTOLIN HFA) 108 (90 BASE) MCG/ACT inhaler Inhale 2 puffs Every 4 (Four) Hours As Needed for wheezing.    ProviderBlanche MD   carvedilol (COREG) 12.5 MG tablet Take 1 tablet by mouth 2 (Two) Times a Day With Meals. 6/29/21   Adán Merrill DO   cloNIDine (CATAPRES) 0.2 MG tablet Take 1 tablet by mouth Every 8 (Eight) Hours. 6/29/21   Adán Merrill DO   DULoxetine (CYMBALTA) 60 MG capsule Take 60 mg by mouth daily.    ProviderBlanche MD   gabapentin (NEURONTIN) 300 MG capsule Take 1 capsule by mouth Every Night for 3 days. 6/29/21 7/2/21  Adán Merrill DO   hydrALAZINE (APRESOLINE) 100 MG tablet Take 1 tablet by mouth 3 (Three) Times a Day. 6/29/21   Adán Merrill DO   insulin detemir (LEVEMIR) 100 UNIT/ML injection Inject 15 Units under the skin into the appropriate area as directed Daily. 6/30/21   Adán Merrill DO   lactulose (CHRONULAC) 10 GM/15ML solution Take 20 g by mouth 3 (Three) Times a Day.    Blanche Neil MD   levothyroxine  "(SYNTHROID, LEVOTHROID) 137 MCG tablet Take 1 tablet by mouth Daily. 6/30/21   Adán Merrill DO   lisinopril (PRINIVIL,ZESTRIL) 20 MG tablet Take 20 mg by mouth 2 (two) times a day.    Blanche Neil MD   NIFEdipine XL (PROCARDIA XL) 90 MG 24 hr tablet Take 1 tablet by mouth Daily. 6/30/21   Adán Merrill DO   ondansetron (ZOFRAN) 4 MG tablet Take 4 mg by mouth Every 8 (Eight) Hours As Needed for nausea or vomiting.    Blanche Neil MD   orphenadrine (NORFLEX) 100 MG 12 hr tablet Take 1 tablet by mouth Every 12 (Twelve) Hours for 3 days. 6/29/21 7/2/21  dAán Merrill DO   rOPINIRole (REQUIP) 4 MG tablet Take 4 mg by mouth Every Night.    Blanche Neil MD   senna (SENOKOT) 8.6 MG tablet tablet Take 1 tablet by mouth Every Night.    Blanche Neil MD   simvastatin (ZOCOR) 20 MG tablet Take 20 mg by mouth Daily.    ProviderBlanche MD     Allergies:  Allergies   Allergen Reactions   • Lamisil [Terbinafine]    • Penicillins    • Reglan [Metoclopramide] GI Intolerance   • Stadol [Butorphanol] Nausea And Vomiting       Objective     Vital Signs: /90   Pulse 63   Temp 97.4 °F (36.3 °C) (Oral)   Resp 18   Ht 167.6 cm (66\")   Wt 63.5 kg (140 lb)   LMP  (LMP Unknown)   SpO2 100%   BMI 22.60 kg/m²   Physical Exam  Vitals and nursing note reviewed.   Constitutional:       Appearance: She is well-developed.   HENT:      Head: Normocephalic.   Eyes:      Conjunctiva/sclera: Conjunctivae normal.      Pupils: Pupils are equal, round, and reactive to light.   Neck:      Vascular: No JVD.   Cardiovascular:      Rate and Rhythm: Normal rate and regular rhythm.      Heart sounds: Normal heart sounds. No murmur heard.   No friction rub. No gallop.    Pulmonary:      Effort: No respiratory distress.      Breath sounds: No wheezing or rales.      Comments: Diminished breath sound bilateral, clear  Chest:      Chest wall: No tenderness.   Abdominal:      General: Bowel sounds are " normal. There is no distension.      Palpations: Abdomen is soft.      Tenderness: There is no abdominal tenderness. There is no guarding or rebound.   Musculoskeletal:         General: No tenderness or deformity. Normal range of motion.      Cervical back: Neck supple.   Skin:     General: Skin is warm and dry.      Capillary Refill: Capillary refill takes 2 to 3 seconds.      Findings: No rash.   Neurological:      Mental Status: She is alert and oriented to person, place, and time.      Cranial Nerves: No cranial nerve deficit.      Motor: Weakness present. No abnormal muscle tone.      Deep Tendon Reflexes: Reflexes normal.   Psychiatric:         Behavior: Behavior normal.         Thought Content: Thought content normal.         Judgment: Judgment normal.              Results Reviewed:    Lab Results (last 24 hours)     Procedure Component Value Units Date/Time    COVID-19,[facility] IN-HOUSE,Nasal Swab No Transport Media 3-4 HR TAT - Swab, Nasal Cavity [[id number]]  (Normal) Collected: 08/17/21 1548    Specimen: Swab from Nasal Cavity Updated: 08/17/21 1642     COVID19 Not Detected    Narrative:      Fact sheet for providers: https://www.fda.gov/media/673338/download     Fact sheet for patients: https://www.fda.gov/media/298383/download    Test performed by PCR.    Consider negative results in combination with clinical observations, patient history, and epidemiological information.    Blood Gas, Arterial - [[id number]]  (Abnormal) Collected: 08/17/21 1550    Specimen: Arterial Blood Updated: 08/17/21 1559     Site Right Radial     Aaron's Test N/A     pH, Arterial 7.370 pH units      pCO2, Arterial 36.8 mm Hg      pO2, Arterial 61.6 mm Hg      Comment: 84 Value below reference range        HCO3, Arterial 21.2 mmol/L      Base Excess, Arterial -3.7 mmol/L      Comment: 84 Value below reference range        O2 Saturation, Arterial 89.8 %      Comment: 84 Value below reference range        Temperature 37.0 C       Barometric Pressure for Blood Gas 748 mmHg      Modality Room Air     Ventilator Mode NA     Collected by [id number]     Comment: Meter: [id number]     :  [id number]        pCO2, Temperature Corrected 36.8 mm Hg      pH, Temp Corrected 7.370 pH Units      pO2, Temperature Corrected 61.6 mm Hg     Osmolality, Serum [[id number]]  (Abnormal) Collected: 08/17/21 1432    Specimen: Blood Updated: 08/17/21 1555     Osmolality 331 mOsm/kg     Phosphorus [[id number]]  (Abnormal) Collected: 08/17/21 1431    Specimen: Blood Updated: 08/17/21 1554     Phosphorus 5.4 mg/dL     [place] Draw [[id number]] Collected: 08/17/21 1431    Specimen: Blood Updated: 08/17/21 1545    Narrative:      The following orders were created for panel order [place] Draw.  Procedure                               Abnormality         Status                     ---------                               -----------         ------                     Green Top (Gel)[[id number]]                                  Final result               Lavender Top[[id number]]                                     Final result               Red Top[[id number]]                                          Final result                 Please view results for these tests on the individual orders.    Green Top (Gel) [[id number]] Collected: 08/17/21 1431    Specimen: Blood Updated: 08/17/21 1545     Extra Tube Hold for add-ons.     Comment: Auto resulted.       Red Top [[id number]] Collected: 08/17/21 1432    Specimen: Blood Updated: 08/17/21 1545     Extra Tube Hold for add-ons.     Comment: Auto resulted.       Lavender Top [[id number]] Collected: 08/17/21 1431    Specimen: Blood Updated: 08/17/21 1545     Extra Tube hold for add-on     Comment: Auto resulted       Comprehensive Metabolic Panel [[id number]]  (Abnormal) Collected: 08/17/21 1431    Specimen: Blood Updated: 08/17/21 1522     Glucose 1,126 mg/dL      BUN 86 mg/dL      Creatinine 5.22 mg/dL      Sodium 110 mmol/L       Potassium 7.3 mmol/L      Chloride 72 mmol/L      CO2 18.0 mmol/L      Calcium 8.9 mg/dL      Total Protein 7.2 g/dL      Albumin 4.10 g/dL      ALT (SGPT) 8 U/L      AST (SGOT) 12 U/L      Alkaline Phosphatase 116 U/L      Total Bilirubin 0.6 mg/dL      eGFR Non African Amer 9 mL/min/1.73      Comment: <15 Indicative of kidney failure.        eGFR   Amer --     Comment: <15 Indicative of kidney failure.        Globulin 3.1 gm/dL      A/G Ratio 1.3 g/dL      BUN/Creatinine Ratio 16.5     Anion Gap 20.0 mmol/L     Narrative:      GFR Normal >60  Chronic Kidney Disease <60  Kidney Failure <15      Troponin [[id number]]  (Abnormal) Collected: 08/17/21 1431    Specimen: Blood Updated: 08/17/21 1509     Troponin T 0.186 ng/mL     Narrative:      Troponin T Reference Range:  <= 0.03 ng/mL-   Negative for AMI  >0.03 ng/mL-     Abnormal for myocardial necrosis.  Clinicians would have to utilize clinical acumen, EKG, Troponin and serial changes to determine if it is an Acute Myocardial Infarction or myocardial injury due to an underlying chronic condition.       Results may be falsely decreased if patient taking Biotin.      Magnesium [[id number]]  (Normal) Collected: 08/17/21 1431    Specimen: Blood Updated: 08/17/21 1502     Magnesium 2.6 mg/dL     CBC & Differential [[id number]]  (Abnormal) Collected: 08/17/21 1431    Specimen: Blood Updated: 08/17/21 5035    Narrative:      The following orders were created for panel order CBC & Differential.  Procedure                               Abnormality         Status                     ---------                               -----------         ------                     CBC Auto Differential[[id number]]        Abnormal            Final result                 Please view results for these tests on the individual orders.    CBC Auto Differential [[id number]]  (Abnormal) Collected: 08/17/21 1431    Specimen: Blood Updated: 08/17/21 1455     WBC 6.75 10*3/mm3      RBC 2.99  10*6/mm3      Hemoglobin 8.8 g/dL      Hematocrit 25.8 %      MCV 86.3 fL      MCH 29.4 pg      MCHC 34.1 g/dL      RDW 14.3 %      RDW-SD 45.0 fl      MPV 10.8 fL      Platelets 137 10*3/mm3      Neutrophil % 79.0 %      Lymphocyte % 11.0 %      Monocyte % 8.0 %      Eosinophil % 0.4 %      Basophil % 1.3 %      Immature Grans % 0.3 %      Neutrophils, Absolute 5.33 10*3/mm3      Lymphocytes, Absolute 0.74 10*3/mm3      Monocytes, Absolute 0.54 10*3/mm3      Eosinophils, Absolute 0.03 10*3/mm3      Basophils, Absolute 0.09 10*3/mm3      Immature Grans, Absolute 0.02 10*3/mm3      nRBC 0.0 /100 WBC            Radiology Data:    Imaging Results (Last 24 Hours)     Procedure Component Value Units Date/Time    XR Chest 1 View [[id number]] Collected: 08/17/21 1608     Updated: 08/17/21 1615    Narrative:      EXAM: XR CHEST 1 VW-     HISTORY: weakness; R73.9-Hyperglycemia, unspecified; E87.5-Hyperkalemia;  E87.0-Ocdf-sfmrvcvuxi and hyponatremia; R53.1-Weakness       COMPARISON: June 19, 2021.     TECHNIQUE: Frontal radiograph of the chest     FINDINGS:   Bilateral interstitial infiltrates are present. This has appearance of  pulmonary vascular congestion.. Cardiac silhouette is mildly enlarged.  Left subclavian endovascular stent graft is present..      The osseous structures and surrounding soft tissues demonstrate no acute  abnormality.          Impression:      1. Cardiomegaly with bilateral interstitial infiltrates most likely  representing mild or early changes of pulmonary vascular congestion.        This report was finalized on 08/17/2021 16:11 by Dr. Lazaro Martinez MD.          I have personally reviewed and interpreted the radiology studies and ECG obtained at time of admission.     Assessment / Plan      Assessment & Plan  Active Hospital Problems    Diagnosis    • Hyperkalemia    • Uncontrolled diabetes mellitus (CMS/HCC)    • End stage kidney disease (CMS/HCC)    • Hyperglycemia    • Gastroparesis due to DM  (CMS/[facility])    • Intractable nausea and vomiting      Plan.    Hyperglycemia.  Plan for dialysis now stat.  Patient start insulin drip in ER.    Chronic renal failure on dialysis.  Plan for dialysis today.    Hyperkalemia.  After dialysis now stat.    Nausea/vomiting.  Pepcid.  Zofran.    Hypertension/hyperlipidemia.  Coreg.  Catapres.  Hydralazine.  Hold lisinopril due to hyperkalemia.  Procardia.  Zocor.    Anxiety/depression.  Cymbalta.    Neuropathy.  Neurontin.  Requip at night.    Chronic smoker.  Nicotine patch.  Discussed patient cutting back and stopping.    Constipation . Lactulose.  Senokot.    Hypothyroidism.  Synthroid.    Code Status: full code     I discussed the patient's findings and my recommendations with: patient    Estimated length of stay: 2-4 days.     Electronically signed by Narinder Madrid MD, 08/17/21, 4:17 PM CDT.              Electronically signed by Narinder Madrid MD at 08/17/21 1654       Operative/Procedure Notes (most recent note)    No notes of this type exist for this encounter.            Physician Progress Notes (most recent note)      Delmar Salazar, GAEL at 08/19/21 0926          Nephrology ([facility]) Progress Note      Patient:  Ena Upton  YOB: 1970  Date of Service: 8/19/2021  MRN: [id number]   Acct: [id number]   Primary Care Physician: Norm Wiggins, GAEL  Advance Directive:   Code Status and Medical Interventions:   Ordered at: 08/17/21 1653     Level Of Support Discussed With:    Patient     Code Status:    CPR     Medical Interventions (Level of Support Prior to Arrest):    Full     Admit Date: 8/17/2021       Hospital Day: 2  Referring Provider: Marcello Lagos MD      Patient personally seen and examined.  Complete chart including Consults, Notes, Operative Reports, Labs, Cardiology, and Radiology studies reviewed as able.        Subjective:  Ena Upton is a 51 y.o. female  whom we were consulted for end  stage renal disease. Maintenance hemodialysis on Tuesday Thursday Saturday. Did not go to outpatient treatment on Tuesday, 8/17 due to conflict with a doctor's appointment. Presented to ER later on that same day due to weakness. Found to have blood glucose >1000, potassium 7.3.  Admitted for emergent hemodialysis. Received HD late on 8/17 with improvement post treatment    Today is seen during dialysis. Feeling better today. Blood pressure significantly elevated overnight  Dialysis   Pt was seen on RRT. Tolerating well  Modality: Hemodialysis  Access: Arterial Venous Fistula  Location: left upper  QB: 450  QD: 700  UF: 3000      Allergies:  Lamisil [terbinafine], Penicillins, Reglan [metoclopramide], and Stadol [butorphanol]    Home Meds:  Medications Prior to Admission   Medication Sig Dispense Refill Last Dose   • albuterol (PROVENTIL HFA;VENTOLIN HFA) 108 (90 BASE) MCG/ACT inhaler Inhale 2 puffs Every 4 (Four) Hours As Needed for wheezing.   Unknown at Unknown time   • carvedilol (COREG) 12.5 MG tablet Take 1 tablet by mouth 2 (Two) Times a Day With Meals.   Unknown at Unknown time   • cloNIDine (CATAPRES) 0.2 MG tablet Take 1 tablet by mouth Every 8 (Eight) Hours.   Unknown at Unknown time   • cloNIDine (CATAPRES-TTS) 0.3 MG/24HR patch Place 1 patch on the skin as directed by provider 1 (One) Time Per Week.   Unknown at Unknown time   • DULoxetine (CYMBALTA) 60 MG capsule Take 60 mg by mouth daily.   Unknown at Unknown time   • hydrALAZINE (APRESOLINE) 50 MG tablet Take 50 mg by mouth 3 (Three) Times a Day.   Unknown at Unknown time   • lactulose (CHRONULAC) 10 GM/15ML solution Take 20 g by mouth 3 (Three) Times a Day.   Unknown at Unknown time   • levothyroxine (SYNTHROID, LEVOTHROID) 25 MCG tablet Take 25 mcg by mouth Daily.   Unknown at Unknown time   • lisinopril (PRINIVIL,ZESTRIL) 20 MG tablet Take 20 mg by mouth 2 (two) times a day.   Unknown at Unknown time   • NIFEdipine XL (PROCARDIA XL) 60 MG 24 hr  tablet Take 60 mg by mouth 2 (two) times a day.   Unknown at Unknown time   • ondansetron (ZOFRAN) 4 MG tablet Take 4 mg by mouth Every 8 (Eight) Hours As Needed for nausea or vomiting.      • pregabalin (LYRICA) 75 MG capsule Take 75 mg by mouth 2 (Two) Times a Day.   Unknown at Unknown time   • rOPINIRole (REQUIP) 4 MG tablet Take 4 mg by mouth Every Night.   Unknown at Unknown time   • senna (SENOKOT) 8.6 MG tablet tablet Take 1 tablet by mouth Every Night.   Unknown at Unknown time   • simvastatin (ZOCOR) 20 MG tablet Take 20 mg by mouth Daily.   Unknown at Unknown time       Medicines:  Current Facility-Administered Medications   Medication Dose Route Frequency Provider Last Rate Last Admin   • albuterol (PROVENTIL) nebulizer solution 0.083% 2.5 mg/3mL  2.5 mg Nebulization Q6H PRN Narinder Madrid MD       • atorvastatin (LIPITOR) tablet 10 mg  10 mg Oral Daily Narinder Madrid MD   10 mg at 08/18/21 1117   • carvedilol (COREG) tablet 12.5 mg  12.5 mg Oral BID With Meals Narinder Madrid MD   12.5 mg at 08/19/21 0545   • cloNIDine (CATAPRES) tablet 0.2 mg  0.2 mg Oral Q8H Narinder Madrid MD   0.2 mg at 08/19/21 0358   • dextrose (D50W) 25 g/ 50mL Intravenous Solution 25 g  25 g Intravenous Q15 Min PRN Narinder Madrid MD       • dextrose (GLUTOSE) oral gel 15 g  15 g Oral Q15 Min PRN Narinder Madrid MD       • DULoxetine (CYMBALTA) DR capsule 30 mg  30 mg Oral Daily Narinder Madrid MD   30 mg at 08/18/21 1117   • famotidine (PEPCID) tablet 20 mg  20 mg Oral Daily Narinder Madrid MD       • gabapentin (NEURONTIN) capsule 300 mg  300 mg Oral Nightly Narinder Madrid MD   300 mg at 08/18/21 2027   • glucagon (human recombinant) (GLUCAGEN DIAGNOSTIC) injection 1 mg  1 mg Subcutaneous Q15 Min PRN Narinder Madrid MD       • hydrALAZINE (APRESOLINE) tablet 50 mg  50 mg Oral TID Narinder Madrid MD   50 mg at 08/18/21 2027   • insulin detemir (LEVEMIR) injection 5 Units  5 Units Subcutaneous Daily Narinder Madrid MD   5  Units at 08/18/21 1117   • insulin lispro (humaLOG) injection 2-7 Units  2-7 Units Subcutaneous 4x Daily With Meals & Nightly Narinder Madrid MD   5 Units at 08/19/21 0754   • lisinopril (PRINIVIL,ZESTRIL) tablet 20 mg  20 mg Oral Q12H Ken Moon MD       • nicotine (NICODERM CQ) 14 MG/24HR patch 1 patch  1 patch Transdermal Q24H Narinder Madrid MD   1 patch at 08/18/21 0942   • NIFEdipine XL (PROCARDIA XL) 24 hr tablet 90 mg  90 mg Oral Q24H Narinder Madrid MD   90 mg at 08/18/21 1116   • ondansetron (ZOFRAN) injection 4 mg  4 mg Intravenous Q6H PRN Narinder Madrid MD   4 mg at 08/18/21 0942   • rOPINIRole (REQUIP) tablet 4 mg  4 mg Oral Nightly Narinder Madrid MD   4 mg at 08/18/21 2026   • senna (SENOKOT) tablet 1 tablet  1 tablet Oral Nightly Narinder Madrid MD   1 tablet at 08/18/21 2026   • sodium chloride 0.9 % flush 10 mL  10 mL Intravenous PRN Narinder Madrid MD       • sodium chloride 0.9 % flush 10 mL  10 mL Intravenous Q12H Narinder Madrid MD   10 mL at 08/18/21 2027   • sodium chloride 0.9 % flush 10 mL  10 mL Intravenous PRN Narinder Madrid MD       • sodium polystyrene (KAYEXALATE) 15 GM/60ML suspension 15 g  15 g Oral Once Narinder Madrid MD           Past Medical History:  Past Medical History:   Diagnosis Date   • Anxiety    • Arthritis    • Chronic kidney disease     STAGE V RENAL FAILURE   • Depression    • Diabetes mellitus (CMS/HCC)    • HTN (hypertension)    • Hypothyroidism    • Obesity    • Tremors of nervous system        Past Surgical History:  Past Surgical History:   Procedure Laterality Date   • ARTERIOVENOUS FISTULA     • BREAST BIOPSY     • CARPAL TUNNEL RELEASE     • CHOLECYSTECTOMY     • TUBAL ABDOMINAL LIGATION         Family History  Family History   Problem Relation Age of Onset   • Cancer Other    • Hypertension Other    • Kidney disease Other    • Heart disease Other    • Diabetes Other    • Diabetes Mother    • Diabetes Maternal Aunt    • Diabetes Maternal  "Grandmother        Social History  Social History     Socioeconomic History   • Marital status:      Spouse name: Not on file   • Number of children: Not on file   • Years of education: Not on file   • Highest education level: Not on file   Tobacco Use   • Smoking status: Current Every Day Smoker     Packs/day: 0.50   • Smokeless tobacco: Never Used   • Tobacco comment: CUTTING BACK AND TRYING TO STOP SMOKING   Substance and Sexual Activity   • Alcohol use: No   • Drug use: No   • Sexual activity: Defer         Review of Systems:  History obtained from chart review and the patient  General ROS: No fever or chills  Respiratory ROS: No cough, shortness of breath, wheezing  Cardiovascular ROS: No chest pain or palpitations  Gastrointestinal ROS: No abdominal pain or melena  Genito-Urinary ROS: No dysuria or hematuria  Neurological ROS: no headache or dizziness    Objective:  Patient Vitals for the past 24 hrs:   BP Temp Temp src Pulse Resp SpO2 Height Weight   08/19/21 0747 (!) 189/85 98.5 °F (36.9 °C) Oral 66 16 98 % -- --   08/19/21 0645 (!) 220/88 -- -- -- -- -- -- --   08/19/21 0545 -- -- -- 64 -- -- -- --   08/19/21 0532 (!) 220/90 -- -- -- -- -- -- --   08/19/21 0355 (!) 194/86 -- -- -- -- -- -- --   08/19/21 0353 -- 98 °F (36.7 °C) Oral 64 16 100 % -- 61.2 kg (134 lb 14.4 oz)   08/18/21 2340 174/74 98.2 °F (36.8 °C) Oral 61 16 100 % -- --   08/18/21 1900 154/63 98 °F (36.7 °C) Oral 63 16 99 % -- --   08/18/21 1726 154/72 -- -- 66 -- -- -- --   08/18/21 1500 145/65 98.2 °F (36.8 °C) Oral 66 16 99 % -- --   08/18/21 1301 96/57 98.3 °F (36.8 °C) Oral 69 18 99 % 170.2 cm (67.01\") 66.4 kg (146 lb 6.2 oz)   08/18/21 1000 149/72 -- -- 68 -- 100 % -- --   08/18/21 0945 149/74 -- -- 67 -- 100 % -- --   08/18/21 0930 147/82 -- -- 68 -- 90 % -- --       Intake/Output Summary (Last 24 hours) at 8/19/2021 0926  Last data filed at 8/18/2021 1718  Gross per 24 hour   Intake 480 ml   Output 3000 ml   Net -2520 ml "     General: awake/alert    Neck: supple, no JVD  Chest:  clear to auscultation bilaterally without respiratory distress  CVS: regular rate and rhythm  Abdominal: soft, nontender, positive bowel sounds  Extremities: no cyanosis or edema  Skin: warm and dry without rash   Neuro: no focal motor deficits      Labs:  Results from last 7 days   Lab Units 08/19/21  0430 08/18/21  0032 08/17/21  1431   WBC 10*3/mm3 4.76 5.37 6.75   HEMOGLOBIN g/dL 8.4* 8.0* 8.8*   HEMATOCRIT % 26.4* 23.5* 25.8*   PLATELETS 10*3/mm3 156 128* 137*         Results from last 7 days   Lab Units 08/19/21  0430 08/18/21  0246 08/17/21  2024 08/17/21  1743 08/17/21  1743 08/17/21  1431 08/17/21  1431   SODIUM mmol/L 133* 133*  --   --  117*   < > 110*   POTASSIUM mmol/L 5.3* 4.3  --   --  5.6*   < > 7.3*   CHLORIDE mmol/L 94* 96*  --   --  79*   < > 72*   CO2 mmol/L 26.0 27.0  --   --  21.0*   < > 18.0*   BUN mg/dL 29* 42*  --   --  76*   < > 86*   CREATININE mg/dL 3.18* 3.47*  --   --  4.68*   < > 5.22*   CALCIUM mg/dL 8.6 9.2  --   --  8.7   < > 8.9   BILIRUBIN mg/dL 0.3 0.4  --   --   --   --  0.6   ALK PHOS U/L 102 102  --   --   --   --  116   ALT (SGPT) U/L 9 11  --   --   --   --  8   AST (SGOT) U/L 16 21  --   --   --   --  12   GLUCOSE mg/dL 295* 67 373*   < > 911*   < > 1,126*    < > = values in this interval not displayed.       Radiology:   Imaging Results (Last 72 Hours)     Procedure Component Value Units Date/Time    XR Chest 1 View [[id number]] Collected: 08/17/21 1608     Updated: 08/17/21 1615    Narrative:      EXAM: XR CHEST 1 VW-     HISTORY: weakness; R73.9-Hyperglycemia, unspecified; E87.5-Hyperkalemia;  E87.1-Jofu-pvyixskslt and hyponatremia; R53.1-Weakness       COMPARISON: June 19, 2021.     TECHNIQUE: Frontal radiograph of the chest     FINDINGS:   Bilateral interstitial infiltrates are present. This has appearance of  pulmonary vascular congestion.. Cardiac silhouette is mildly enlarged.  Left subclavian endovascular  stent graft is present..      The osseous structures and surrounding soft tissues demonstrate no acute  abnormality.          Impression:      1. Cardiomegaly with bilateral interstitial infiltrates most likely  representing mild or early changes of pulmonary vascular congestion.        This report was finalized on 08/17/2021 16:11 by Dr. Lazaro Martinez MD.          Culture:  No results found for: BLOODCX, URINECX, WOUNDCX, MRSACX, RESPCX, STOOLCX      Assessment   1.  End stage renal disease on HD   2.  Essential hypertension  3.  Hyponatremia  4.  Hyperkalemia  5.  Anemia of CKD  6.  Acute pulmonary edema--improved  7.  Secondary hyperparathyroidism  8.  Hyperglycemia--improved    Plan:  1.  Dialysis today  2.  Monitor blood pressure after HD completed, titrate medications PRN  3.  Monitor labs      GAEL Campo  8/19/2021  09:26 CDT    Electronically signed by Delmar Salazar APRN at 08/19/21 0928          Consult Notes (most recent note)      Surjit Avitia MD at 08/17/21 1628          Nephrology ([facility]) Consult Note      Patient:  Ena Upton  YOB: 1970  Date of Service: 8/17/2021  MRN: [id number]   Acct: [id number]   Primary Care Physician: Norm Wiggins APRN  Advance Directive:   There are no questions and answers to display.     Admit Date: 8/17/2021       Hospital Day: 0  Referring Provider: Marcello Lagos MD      Patient personally seen and examined.  Complete chart including Consults, Notes, Operative Reports, Labs, Cardiology, and Radiology studies reviewed as able.        Subjective:  Ena Uptno is a 51 y.o. female  whom we were consulted for end-stage renal disease.  Patient routinely receives her dialysis Tuesday Thursday Saturday.  Last treatment on Saturday without issue.  She had a doctor's appointment scheduled for today so elected to not proceed with her routine dialysis on schedule.  She later developed  weakness and presented to emergency room for evaluation.  She was found to have blood glucose greater than 1000 along with hyponatremia, hyperkalemia.  Patient admitted to the hospitalist service and we were consulted for the metabolic derangements.  Patient admitted to shortness of air and weakness.  Denied chest pain, nausea or vomiting.    Dialysis   Pt was seen on RRT at 1740  Modality: Hemodialysis  Access: Arterial Venous Fistula  Location: left upper  QB: 350  QD: 500  UF: 1300        Allergies:  Lamisil [terbinafine], Penicillins, Reglan [metoclopramide], and Stadol [butorphanol]    Home Meds:  (Not in a hospital admission)      Medicines:  Current Facility-Administered Medications   Medication Dose Route Frequency Provider Last Rate Last Admin   • dextrose (D50W) 25 g/ 50mL Intravenous Solution 25-50 mL  25-50 mL Intravenous Q30 Min PRN Marcello Lagos MD       • insulin regular (HumuLIN R,NovoLIN R) 100 Units in sodium chloride 0.9 % 100 mL (1 Units/mL) infusion  1-20 Units/hr Intravenous Titrated Marcello Lagos MD 4 mL/hr at 08/17/21 1546 4 Units/hr at 08/17/21 1546   • insulin regular (humuLIN R,novoLIN R) injection 7 Units  7 Units Intravenous TID AC Marcello Lagos MD       • sodium chloride 0.9 % flush 10 mL  10 mL Intravenous PRN Marcello Lagos MD         Current Outpatient Medications   Medication Sig Dispense Refill   • albuterol (PROVENTIL HFA;VENTOLIN HFA) 108 (90 BASE) MCG/ACT inhaler Inhale 2 puffs Every 4 (Four) Hours As Needed for wheezing.     • carvedilol (COREG) 12.5 MG tablet Take 1 tablet by mouth 2 (Two) Times a Day With Meals.     • cloNIDine (CATAPRES) 0.2 MG tablet Take 1 tablet by mouth Every 8 (Eight) Hours.     • DULoxetine (CYMBALTA) 60 MG capsule Take 60 mg by mouth daily.     • gabapentin (NEURONTIN) 300 MG capsule Take 1 capsule by mouth Every Night for 3 days. 3 capsule 0   • hydrALAZINE (APRESOLINE) 100 MG tablet Take 1 tablet by mouth 3 (Three)  Times a Day.     • insulin detemir (LEVEMIR) 100 UNIT/ML injection Inject 15 Units under the skin into the appropriate area as directed Daily.  12   • lactulose (CHRONULAC) 10 GM/15ML solution Take 20 g by mouth 3 (Three) Times a Day.     • levothyroxine (SYNTHROID, LEVOTHROID) 137 MCG tablet Take 1 tablet by mouth Daily.     • lisinopril (PRINIVIL,ZESTRIL) 20 MG tablet Take 20 mg by mouth 2 (two) times a day.     • NIFEdipine XL (PROCARDIA XL) 90 MG 24 hr tablet Take 1 tablet by mouth Daily.     • ondansetron (ZOFRAN) 4 MG tablet Take 4 mg by mouth Every 8 (Eight) Hours As Needed for nausea or vomiting.     • orphenadrine (NORFLEX) 100 MG 12 hr tablet Take 1 tablet by mouth Every 12 (Twelve) Hours for 3 days. 6 tablet 0   • rOPINIRole (REQUIP) 4 MG tablet Take 4 mg by mouth Every Night.     • senna (SENOKOT) 8.6 MG tablet tablet Take 1 tablet by mouth Every Night.     • simvastatin (ZOCOR) 20 MG tablet Take 20 mg by mouth Daily.         Past Medical History:  Past Medical History:   Diagnosis Date   • Anxiety    • Arthritis    • Chronic kidney disease     STAGE V RENAL FAILURE   • Depression    • Diabetes mellitus (CMS/HCC)    • HTN (hypertension)    • Hypothyroidism    • Obesity    • Tremors of nervous system        Past Surgical History:  Past Surgical History:   Procedure Laterality Date   • ARTERIOVENOUS FISTULA     • BREAST BIOPSY     • CARPAL TUNNEL RELEASE     • CHOLECYSTECTOMY     • TUBAL ABDOMINAL LIGATION         Family History  Family History   Problem Relation Age of Onset   • Cancer Other    • Hypertension Other    • Kidney disease Other    • Heart disease Other    • Diabetes Other    • Diabetes Mother    • Diabetes Maternal Aunt    • Diabetes Maternal Grandmother        Social History  Social History     Socioeconomic History   • Marital status:      Spouse name: Not on file   • Number of children: Not on file   • Years of education: Not on file   • Highest education level: Not on file  "  Tobacco Use   • Smoking status: Current Every Day Smoker     Packs/day: 0.50   • Smokeless tobacco: Never Used   • Tobacco comment: CUTTING BACK AND TRYING TO STOP SMOKING   Substance and Sexual Activity   • Alcohol use: No   • Drug use: No   • Sexual activity: Defer         Review of Systems:  History obtained from chart review and the patient  General ROS: No fever or chills  Respiratory ROS: +cough, shortness of breath  Cardiovascular ROS: No chest pain or palpitations  Gastrointestinal ROS: No abdominal pain or melena  Genito-Urinary ROS: No dysuria or hematuria  14 point ROS reviewed with the patient and negative except as noted above and in the HPI unless unable to obtain.    Objective:  Patient Vitals for the past 24 hrs:   BP Temp Temp src Pulse Resp SpO2 Height Weight   08/17/21 1616 155/90 -- -- 63 -- 100 % -- --   08/17/21 1601 127/58 -- -- 60 -- 100 % -- --   08/17/21 1553 -- -- -- 61 18 100 % -- --   08/17/21 1434 112/70 97.4 °F (36.3 °C) Oral -- -- -- -- --   08/17/21 1429 -- -- -- -- 18 -- -- --   08/17/21 1428 -- -- -- 58 -- 98 % 167.6 cm (66\") 63.5 kg (140 lb)       Intake/Output Summary (Last 24 hours) at 8/17/2021 1629  Last data filed at 8/17/2021 1623  Gross per 24 hour   Intake 1100 ml   Output --   Net 1100 ml     General: awake/alert   Chest:  Bilateral crackles  CVS: regular rate and rhythm  Abdominal: soft, nontender, positive bowel sounds  Extremities: ble edema  Skin: warm and dry without rash      Labs:  Results from last 7 days   Lab Units 08/17/21  1431   WBC 10*3/mm3 6.75   HEMOGLOBIN g/dL 8.8*   HEMATOCRIT % 25.8*   PLATELETS 10*3/mm3 137*         Results from last 7 days   Lab Units 08/17/21  1431   SODIUM mmol/L 110*   POTASSIUM mmol/L 7.3*   CHLORIDE mmol/L 72*   CO2 mmol/L 18.0*   BUN mg/dL 86*   CREATININE mg/dL 5.22*   CALCIUM mg/dL 8.9   BILIRUBIN mg/dL 0.6   ALK PHOS U/L 116   ALT (SGPT) U/L 8   AST (SGOT) U/L 12   GLUCOSE mg/dL 1,126*       Radiology:   Imaging Results " (Last 72 Hours)     Procedure Component Value Units Date/Time    XR Chest 1 View [[id number]] Collected: 08/17/21 1608     Updated: 08/17/21 1615    Narrative:      EXAM: XR CHEST 1 VW-     HISTORY: weakness; R73.9-Hyperglycemia, unspecified; E87.5-Hyperkalemia;  E87.3-Ermj-tketnepaez and hyponatremia; R53.1-Weakness       COMPARISON: June 19, 2021.     TECHNIQUE: Frontal radiograph of the chest     FINDINGS:   Bilateral interstitial infiltrates are present. This has appearance of  pulmonary vascular congestion.. Cardiac silhouette is mildly enlarged.  Left subclavian endovascular stent graft is present..      The osseous structures and surrounding soft tissues demonstrate no acute  abnormality.          Impression:      1. Cardiomegaly with bilateral interstitial infiltrates most likely  representing mild or early changes of pulmonary vascular congestion.        This report was finalized on 08/17/2021 16:11 by Dr. Lazaro Martinez MD.          Culture:  No results found for: BLOODCX, URINECX, WOUNDCX, MRSACX, RESPCX, STOOLCX      Assessment   End-stage renal disease  Hypertension  Hyponatremia  Hyperkalemia  Anemia chronic kidney disease  Secondary hyperparathyroidism  Acute pulmonary edema    Plan:  Discussed with patient, nursing, primary  Work-up ordered and ongoing  Awaiting ICU bed for initiation of dialysis, nursing aware of critical nature  Monitor labs  Medically manage potassium until dialysis available    Thank you for the consult, we appreciate the opportunity to provide care to your patients.  Feel free to contact me if I can be of any further assistance.      Surjit Avitia MD  8/17/2021  16:29 CDT      Electronically signed by Surjit Avitia MD at 08/17/21 1840       Nutrition Notes (most recent note)    No notes exist for this encounter.            Physical Therapy Notes (most recent note)      Elinor Lovell, PT Student at 08/18/21 1531  Version 1 of 1    Attestation signed by  Ran Nichole, PT DPT at 21 1532    I reviewed the documentation and agree.                   Goal Outcome Evaluation:  Plan of Care Reviewed With: patient           Outcome Summary: This pt was admitted on  for weakness and hyperglycemia. The patient was found to have extremely elevated glucose levels into the one thousand sean. The patient suffers from ESRD in which she receives dialysis for. The pt was found to have cardiomegaly with intersititual infiltrates. Pt suffers from gastroparesis in which she states that she is unable to control bowel or bladder movements. Pt states that her doctors are aware of this at this time. The patient is scheduled to have a microdiscectomy in this facility on 21 in which she states she still plans to have the surgery. The pt presents with 4/5 strength on the R LE and 3-/5 strength on the L LE. Pt also has decerased ROM on the L due to weakness/back pain. The patient has absent sensation on the B LE to light touch and deep pressure. Pt states that she lived in a SNF after discharging from this facility earlier in ther year, but she soon moved back home where she lived independently. Pt states that she utilized a walker at home to perform mobility and that she was independent in all ADLs. PT will continue to treat the patient's deficits in order to increase functional mobility/safety awareness. PT recommends a SNF to be the patients discharge destination at this time.    Electronically signed by Ran Nichole, PT DPT at 21 1532          Occupational Therapy Notes (most recent note)      Desire Keita OTR/L at 21 1545          Patient Name: Ena Upton  : 1970    MRN: [id number]                              Today's Date: 2021       Admit Date: 2021    Visit Dx:     ICD-10-CM ICD-9-CM   1. Hyperglycemia  R73.9 790.29   2. Hyperkalemia  E87.5 276.7   3. Hyponatremia  E87.1 276.1   4. Generalized weakness  R53.1 780.79   5.  Impaired mobility  Z74.09 799.89   6. 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)   • Non-ketotic hyperosmolar coma (CMS/HCC)   • HTN (hypertension)   • Intractable nausea and vomiting   • Constipation   • Gastroparesis due to DM (CMS/HCC)   • Hyperglycemia   • Seizure (CMS/HCC)   • End stage kidney disease (CMS/HCC)   • Sacral insufficiency fracture   • Hypertensive urgency   • Uncontrolled diabetes mellitus (CMS/HCC)   • Hyperkalemia     Past Medical History:   Diagnosis Date   • Anxiety    • Arthritis    • Chronic kidney disease     STAGE V RENAL FAILURE   • Depression    • Diabetes mellitus (CMS/HCC)    • HTN (hypertension)    • Hypothyroidism    • Obesity    • Tremors of nervous system      Past Surgical History:   Procedure Laterality Date   • ARTERIOVENOUS FISTULA     • BREAST BIOPSY     • CARPAL TUNNEL RELEASE     • CHOLECYSTECTOMY     • TUBAL ABDOMINAL LIGATION       General Information     Row Name 08/18/21 1406          OT Time and Intention    Document Type  evaluation Hyperglycemic/weakness, Cardiomegaly with B intertistial infiltratres, Gastroparesis, ESRD with dialysis, Pulmonary/vascular congestion  -JJ     Mode of Treatment  occupational therapy  -JJ     Row Name 08/18/21 1406 08/18/21 1405       General Information    Patient Profile Reviewed  yes  -JJ  --    Prior Level of Function  independent:;all household mobility;transfer;bed mobility;ADL's  -JJ  --    Existing Precautions/Restrictions  fall  -JJ  fall  -JJ    Barriers to Rehab  medically complex;environmental barriers Pt states she lives alone which is a barrier to her health  -JJ  --    Row Name 08/18/21 1406 08/18/21 1405       Living Environment    Lives With  alone  -JJ  alone  -JJ    Row Name 08/18/21 1406          Home Main Entrance    Number of Stairs, Main Entrance  four  -JJ     Stair Railings, Main Entrance  railings  on both sides of stairs  -[facility] Name 08/18/21 1406          Stairs Within Home, Primary    Stairs, Within Home, Primary  No stairs inside the home, Pt has a shower tub that she states is an issue. pt desires a shower chair.  -[facility] Name 08/18/21 1406          Cognition    Orientation Status (Cognition)  oriented x 4  -[facility] Name 08/18/21 1406          Safety Issues, Functional Mobility    Safety Issues Affecting Function (Mobility)  safety precaution awareness;safety precautions follow-through/compliance  -     Impairments Affecting Function (Mobility)  balance;cognition;pain;range of motion (ROM);sensation/sensory awareness;shortness of breath;strength  -     Cognitive Impairments, Mobility Safety/Performance  awareness, need for assistance  -       User Key  (r) = Recorded By, (t) = Taken By, (c) = Cosigned By    Initials Name Provider Type     Desire Keita OTR/L Occupational Therapist          Mobility/ADL's     [facility] Name 08/18/21 1406          Bed Mobility    Comment (Bed Mobility)  up in chair upon entry to room  -[facility] Name 08/18/21 1406          Transfers    Transfers  sit-stand transfer  -     Comment (Transfers)  sit <> stand x4 reps  -     Sit-Stand [place] (Transfers)  1 person assist;minimum assist (75% patient effort)  -[facility] Name 08/18/21 1406          Sit-Stand Transfer    Assistive Device (Sit-Stand Transfers)  walker, front-wheeled  -[facility] Name 08/18/21 1406          Functional Mobility    Functional Mobility- Ind. Level  minimum assist (75% patient effort);1 person  -     Functional Mobility- Device  rolling walker  -     Functional Mobility- Comment  in room. Pt fatigued quickly with activity.  -     Row Name 08/18/21 1406          Activities of Daily Living    BADL Assessment/Intervention  toileting;upper body dressing;lower body dressing  -[facility] Name 08/18/21 1406          Toileting Assessment/Training    [place] Level  (Toileting)  perform perineal hygiene;change pad/brief;maximum assist (25% patient effort)  -JJ     Position (Toileting)  supported standing;supported sitting  -JJ     Comment (Toileting)  incontinent of stool during session requiring Max A.  -JJ     Row Name 08/18/21 1406          Upper Body Dressing Assessment/Training    [place] Level (Upper Body Dressing)  don;doff;minimum assist (75% patient effort)  -JJ     Position (Upper Body Dressing)  supported sitting  -JJ     Comment (Upper Body Dressing)  [facility] gown  -     Row Name 08/18/21 1406          Lower Body Dressing Assessment/Training    [place] Level (Lower Body Dressing)  don;doff;socks;maximum assist (25% patient effort)  -JJ     Position (Lower Body Dressing)  supported sitting  -JJ       User Key  (r) = Recorded By, (t) = Taken By, (c) = Cosigned By    Initials Name Provider Type    Desire Villa, OTR/L Occupational Therapist        Obj/Interventions     Row Name 08/18/21 1406          Sensory Interventions    Comment, Sensory Intervention  Pt reports n/t inf fingertips of B hands.  -J     Row Name 08/18/21 1406          Range of Motion Comprehensive    General Range of Motion  bilateral upper extremity ROM WFL  -J     Row Name 08/18/21 1406          Strength Comprehensive (MMT)    Comment, General Manual Muscle Testing (MMT) Assessment  B UE strength grossly 4/5  -J     Row Name 08/18/21 1406          Balance    Balance Assessment  sitting static balance;standing static balance  -JJ     Static Sitting Balance  WFL;sitting in chair;supported  -JJ     Static Standing Balance  mild impairment;supported;standing  -JJ       User Key  (r) = Recorded By, (t) = Taken By, (c) = Cosigned By    Initials Name Provider Type    Desire Villa OTR/L Occupational Therapist        Goals/Plan     Row Name 08/18/21 1405          Dressing Goal 1 (OT)    Activity/Device (Dressing Goal 1, OT)  dressing skills, all  -JJ     [place]/Cues  Needed (Dressing Goal 1, OT)  contact guard assist  -JJ     Time Frame (Dressing Goal 1, OT)  long term goal (LTG);by discharge  -JJ     Progress/Outcome (Dressing Goal 1, OT)  goal ongoing  -     Row Name 08/18/21 140          Toileting Goal 1 (OT)    Activity/Device (Toileting Goal 1, OT)  toileting skills, all  -JJ     Sharp Level/Cues Needed (Toileting Goal 1, OT)  independent  -JJ     Time Frame (Toileting Goal 1, OT)  long term goal (LTG);by discharge  -JJ     Progress/Outcome (Toileting Goal 1, OT)  goal ongoing  -     Row Name 08/18/21 1401          Therapy Assessment/Plan (OT)    Planned Therapy Interventions (OT)  activity tolerance training;adaptive equipment training;BADL retraining;functional balance retraining;occupation/activity based interventions;patient/caregiver education/training;transfer/mobility retraining;strengthening exercise  -JJ       User Key  (r) = Recorded By, (t) = Taken By, (c) = Cosigned By    Initials Name Provider Type    Desire Villa, OTR/L Occupational Therapist        Clinical Impression     Row Name 08/18/21 1408          Pain Assessment    Additional Documentation  Pain Scale: FACES Pre/Post-Treatment (Group)  -     Row Name 08/18/21 1406          Pain Scale: FACES Pre/Post-Treatment    Pain: FACES Scale, Pretreatment  2-->hurts little bit  -JJ     Posttreatment Pain Rating  2-->hurts little bit  -JJ     Pain Location - Side  Left  -JJ     Pain Location - Orientation  posterior  -JJ     Pain Location  back  -JJ     Row Name 08/18/21 1407          Plan of Care Review    Plan of Care Reviewed With  patient  -JJ     Outcome Summary  OT eval completed. Pt is alert and oriented x4, seated in chair upon entry to room. Pt reports that at baseline she is independent with all mobility and ADLs. Today, she presents with decreased strength, balance, activity tolerance, sensation and increased pain. Pt incontinent of bowel during session required Max A for perineal  hygiene, clothing mgt. Min A for donning/doffing gown. Max A for donning/doffing socks. Min A for sit <> stand t/f from chair, complete x4 reps and for small steps in room with rwx. Pt would benefit from skilled OT services to address these deficits. Pt is at a high risk for falls at this time and she reports that she has had four falls since d/c from SNF earlier this month. Recommend d/c back to SNF for continued therapy services.  -     Row Name 08/18/21 1406          Therapy Assessment/Plan (OT)    Patient/Family Therapy Goal Statement (OT)  return home  -     Rehab Potential (OT)  good, to achieve stated therapy goals  -     Criteria for Skilled Therapeutic Interventions Met (OT)  yes;skilled treatment is necessary  -     Therapy Frequency (OT)  5 times/wk  -     Predicted Duration of Therapy Intervention (OT)  10  -     Row Name 08/18/21 1406          Therapy Plan Review/Discharge Plan (OT)    Anticipated Discharge Disposition (OT)  [facility] nursing [facility]  -     Row Name 08/18/21 1406          Vital Signs    Pre Patient Position  Sitting  -J     Intra Patient Position  Standing  -JJ     Post Patient Position  Sitting  -J     Row Name 08/18/21 1406          Positioning and Restraints    Pre-Treatment Position  sitting in chair/recliner  -JJ     Post Treatment Position  chair  -JJ     In Chair  reclined;call light within reach;encouraged to call for assist;with family/caregiver  -       User Key  (r) = Recorded By, (t) = Taken By, (c) = Cosigned By    Initials Name Provider Type    Desire Villa OTR/L Occupational Therapist        Outcome Measures     Row Name 08/18/21 1406          How much help from another is currently needed...    Putting on and taking off regular lower body clothing?  2  -JJ     Bathing (including washing, rinsing, and drying)  2  -JJ     Toileting (which includes using toilet bed pan or urinal)  2  -JJ     Putting on and taking off regular upper body clothing  3   -JJ     Taking care of personal grooming (such as brushing teeth)  4  -JJ     Eating meals  4  -JJ     AM-PAC 6 Clicks Score (OT)  17  -JJ     Row Name 08/18/21 1405          How much help from another person do you currently need...    Turning from your back to your side while in flat bed without using bedrails?  3  -BACILIO (r) PH (t) BACILIO (c)     Moving from lying on back to sitting on the side of a flat bed without bedrails?  3  -BACILIO (r) PH (t) BACILIO (c)     Moving to and from a bed to a chair (including a wheelchair)?  3  -BACILIO (r) PH (t) BACILIO (c)     Standing up from a chair using your arms (e.g., wheelchair, bedside chair)?  3  -BACILIO (r) PH (t) BACILIO (c)     Climbing 3-5 steps with a railing?  2  -BACILIO (r) PH (t) BACILIO (c)     To walk in hospital room?  3  -BACILIO (r) PH (t) BACILIO (c)     AM-PAC 6 Clicks Score (PT)  17  -BACILIO (r) PH (t)     Row Name 08/18/21 1406 08/18/21 1405       Functional Assessment    Outcome Measure Options  AM-PAC 6 Clicks Daily Activity (OT)  -JJ  AM-PAC 6 Clicks Basic Mobility (PT)  -BACILIO (r) PH (t) BACILIO (c)      User Key  (r) = Recorded By, (t) = Taken By, (c) = Cosigned By    Initials Name Provider Type    BACILIO Ran Nichole, PT DPT Physical Therapist    Desire Villa, OTR/L Occupational Therapist    Elinor Taylor, PT Student PT Student          Occupational Therapy Education                 Title: PT OT SLP Therapies (In Progress)     Topic: Occupational Therapy (In Progress)     Point: ADL training (Done)     Description:   Instruct learner(s) on proper safety adaptation and remediation techniques during self care or transfers.   Instruct in proper use of assistive devices.              Learning Progress Summary           Patient Acceptance, E, VU by SAM at 8/18/2021 9965                   Point: Home exercise program (Not Started)     Description:   Instruct learner(s) on appropriate technique for monitoring, assisting and/or progressing therapeutic exercises/activities.              Learner Progress:   Not documented in this visit.          Point: Precautions (Done)     Description:   Instruct learner(s) on prescribed precautions during self-care and functional transfers.              Learning Progress Summary           Patient Acceptance, TAMEKA, VU by SAM at 8/18/2021 2585                   Point: Body mechanics (Not Started)     Description:   Instruct learner(s) on proper positioning and spine alignment during self-care, functional mobility activities and/or exercises.              Learner Progress:  Not documented in this visit.                      User Key     Initials Effective Dates Name Provider Type Discipline    SAM 06/16/21 -  Desire Keita OTR/L Occupational Therapist OT              OT Recommendation and Plan  Planned Therapy Interventions (OT): activity tolerance training, adaptive equipment training, BADL retraining, functional balance retraining, occupation/activity based interventions, patient/caregiver education/training, transfer/mobility retraining, strengthening exercise  Therapy Frequency (OT): 5 times/wk  Plan of Care Review  Plan of Care Reviewed With: patient  Outcome Summary: OT eval completed. Pt is alert and oriented x4, seated in chair upon entry to room. Pt reports that at baseline she is independent with all mobility and ADLs. Today, she presents with decreased strength, balance, activity tolerance, sensation and increased pain. Pt incontinent of bowel during session required Max A for perineal hygiene, clothing mgt. Min A for donning/doffing gown. Max A for donning/doffing socks. Min A for sit <> stand t/f from chair, complete x4 reps and for small steps in room with rwx. Pt would benefit from skilled OT services to address these deficits. Pt is at a high risk for falls at this time and she reports that she has had four falls since d/c from SNF earlier this month. Recommend d/c back to SNF for continued therapy services.     Time Calculation:   Time Calculation- OT     Row Name  08/18/21 1545             Time Calculation- OT    OT Start Time  1405  -J      OT Stop Time  1449  -J      OT Time Calculation (min)  44 min  -      OT Received On  08/18/21  -      OT Goal Re-Cert Due Date  08/28/21  -        User Key  (r) = Recorded By, (t) = Taken By, (c) = Cosigned By    Initials Name Provider Type    Desire Villa OTR/L Occupational Therapist        Therapy Charges for Today     Code Description Service Date Service Provider Modifiers Qty    [id number] HC OT EVAL LOW COMPLEXITY 3 8/18/2021 Desire Keita OTR/L GO 1               MIMI Summers  8/18/2021    Electronically signed by Desire Keita OTR/L at 08/18/21 1545       Speech Language Pathology Notes (most recent note)    No notes exist for this encounter.         Respiratory Therapy Notes (most recent note)    No notes exist for this encounter.

## 2021-08-19 NOTE — PLAN OF CARE
Problem: Adult Inpatient Plan of Care  Goal: Plan of Care Review  Flowsheets (Taken 8/19/2021 1430)  Progress: improving  Plan of Care Reviewed With: patient  Outcome Summary: No problems with pt. performing ue exs to increase her act. xochilt and adl tasks!   Goal Outcome Evaluation:  Plan of Care Reviewed With: patient        Progress: improving  Outcome Summary: No problems with pt. performing ue exs to increase her act. xochilt and adl tasks!

## 2021-08-19 NOTE — PROGRESS NOTES
Nephrology ([facility]) Progress Note      Patient:  Ena Upton  YOB: 1970  Date of Service: 8/19/2021  MRN: [id number]   Acct: [id number]   Primary Care Physician: Norm Wiggins APRN  Advance Directive:   Code Status and Medical Interventions:   Ordered at: 08/17/21 1653     Level Of Support Discussed With:    Patient     Code Status:    CPR     Medical Interventions (Level of Support Prior to Arrest):    Full     Admit Date: 8/17/2021       Hospital Day: 2  Referring Provider: Marcello Lagos MD      Patient personally seen and examined.  Complete chart including Consults, Notes, Operative Reports, Labs, Cardiology, and Radiology studies reviewed as able.        Subjective:  Ena Upton is a 51 y.o. female  whom we were consulted for end stage renal disease. Maintenance hemodialysis on Tuesday Thursday Saturday. Did not go to outpatient treatment on Tuesday, 8/17 due to conflict with a doctor's appointment. Presented to ER later on that same day due to weakness. Found to have blood glucose >1000, potassium 7.3.  Admitted for emergent hemodialysis. Received HD late on 8/17 with improvement post treatment    Today is seen during dialysis. Feeling better today. Blood pressure significantly elevated overnight  Dialysis   Pt was seen on RRT. Tolerating well  Modality: Hemodialysis  Access: Arterial Venous Fistula  Location: left upper  QB: 450  QD: 700  UF: 3000      Allergies:  Lamisil [terbinafine], Penicillins, Reglan [metoclopramide], and Stadol [butorphanol]    Home Meds:  Medications Prior to Admission   Medication Sig Dispense Refill Last Dose   • albuterol (PROVENTIL HFA;VENTOLIN HFA) 108 (90 BASE) MCG/ACT inhaler Inhale 2 puffs Every 4 (Four) Hours As Needed for wheezing.   Unknown at Unknown time   • carvedilol (COREG) 12.5 MG tablet Take 1 tablet by mouth 2 (Two) Times a Day With Meals.   Unknown at Unknown time   • cloNIDine (CATAPRES) 0.2 MG tablet  Take 1 tablet by mouth Every 8 (Eight) Hours.   Unknown at Unknown time   • cloNIDine (CATAPRES-TTS) 0.3 MG/24HR patch Place 1 patch on the skin as directed by provider 1 (One) Time Per Week.   Unknown at Unknown time   • DULoxetine (CYMBALTA) 60 MG capsule Take 60 mg by mouth daily.   Unknown at Unknown time   • hydrALAZINE (APRESOLINE) 50 MG tablet Take 50 mg by mouth 3 (Three) Times a Day.   Unknown at Unknown time   • lactulose (CHRONULAC) 10 GM/15ML solution Take 20 g by mouth 3 (Three) Times a Day.   Unknown at Unknown time   • levothyroxine (SYNTHROID, LEVOTHROID) 25 MCG tablet Take 25 mcg by mouth Daily.   Unknown at Unknown time   • lisinopril (PRINIVIL,ZESTRIL) 20 MG tablet Take 20 mg by mouth 2 (two) times a day.   Unknown at Unknown time   • NIFEdipine XL (PROCARDIA XL) 60 MG 24 hr tablet Take 60 mg by mouth 2 (two) times a day.   Unknown at Unknown time   • ondansetron (ZOFRAN) 4 MG tablet Take 4 mg by mouth Every 8 (Eight) Hours As Needed for nausea or vomiting.      • pregabalin (LYRICA) 75 MG capsule Take 75 mg by mouth 2 (Two) Times a Day.   Unknown at Unknown time   • rOPINIRole (REQUIP) 4 MG tablet Take 4 mg by mouth Every Night.   Unknown at Unknown time   • senna (SENOKOT) 8.6 MG tablet tablet Take 1 tablet by mouth Every Night.   Unknown at Unknown time   • simvastatin (ZOCOR) 20 MG tablet Take 20 mg by mouth Daily.   Unknown at Unknown time       Medicines:  Current Facility-Administered Medications   Medication Dose Route Frequency Provider Last Rate Last Admin   • albuterol (PROVENTIL) nebulizer solution 0.083% 2.5 mg/3mL  2.5 mg Nebulization Q6H PRN Narinder Madrid MD       • atorvastatin (LIPITOR) tablet 10 mg  10 mg Oral Daily Narinder Madrid MD   10 mg at 08/18/21 1117   • carvedilol (COREG) tablet 12.5 mg  12.5 mg Oral BID With Meals Narinder Madrid MD   12.5 mg at 08/19/21 0545   • cloNIDine (CATAPRES) tablet 0.2 mg  0.2 mg Oral Q8H Narinder Madrid MD   0.2 mg at 08/19/21 0358   •  dextrose (D50W) 25 g/ 50mL Intravenous Solution 25 g  25 g Intravenous Q15 Min PRN Narinder Madrid MD       • dextrose (GLUTOSE) oral gel 15 g  15 g Oral Q15 Min PRN Narinder Madrid MD       • DULoxetine (CYMBALTA) DR capsule 30 mg  30 mg Oral Daily Narinder Madrid MD   30 mg at 08/18/21 1117   • famotidine (PEPCID) tablet 20 mg  20 mg Oral Daily Narinder Madrid MD       • gabapentin (NEURONTIN) capsule 300 mg  300 mg Oral Nightly Narinder Madrid MD   300 mg at 08/18/21 2027   • glucagon (human recombinant) (GLUCAGEN DIAGNOSTIC) injection 1 mg  1 mg Subcutaneous Q15 Min PRN Narinder Madrid MD       • hydrALAZINE (APRESOLINE) tablet 50 mg  50 mg Oral TID Narinder Madrid MD   50 mg at 08/18/21 2027   • insulin detemir (LEVEMIR) injection 5 Units  5 Units Subcutaneous Daily Narinder Madrid MD   5 Units at 08/18/21 1117   • insulin lispro (humaLOG) injection 2-7 Units  2-7 Units Subcutaneous 4x Daily With Meals & Nightly Narinder Madrid MD   5 Units at 08/19/21 0754   • lisinopril (PRINIVIL,ZESTRIL) tablet 20 mg  20 mg Oral Q12H Ken Moon MD       • nicotine (NICODERM CQ) 14 MG/24HR patch 1 patch  1 patch Transdermal Q24H Narinder Madrid MD   1 patch at 08/18/21 0942   • NIFEdipine XL (PROCARDIA XL) 24 hr tablet 90 mg  90 mg Oral Q24H Narinder Madrid MD   90 mg at 08/18/21 1116   • ondansetron (ZOFRAN) injection 4 mg  4 mg Intravenous Q6H PRN Narinder Madrid MD   4 mg at 08/18/21 0942   • rOPINIRole (REQUIP) tablet 4 mg  4 mg Oral Nightly Narinder Madrid MD   4 mg at 08/18/21 2026   • senna (SENOKOT) tablet 1 tablet  1 tablet Oral Nightly Narinder Madrid MD   1 tablet at 08/18/21 2026   • sodium chloride 0.9 % flush 10 mL  10 mL Intravenous PRN Narinder Madrid MD       • sodium chloride 0.9 % flush 10 mL  10 mL Intravenous Q12H Narinder Madrid MD   10 mL at 08/18/21 2027   • sodium chloride 0.9 % flush 10 mL  10 mL Intravenous PRN Narinder Madrid MD       • sodium polystyrene (KAYEXALATE) 15 GM/60ML  suspension 15 g  15 g Oral Once Narinder Madrid MD           Past Medical History:  Past Medical History:   Diagnosis Date   • Anxiety    • Arthritis    • Chronic kidney disease     STAGE V RENAL FAILURE   • Depression    • Diabetes mellitus (CMS/HCC)    • HTN (hypertension)    • Hypothyroidism    • Obesity    • Tremors of nervous system        Past Surgical History:  Past Surgical History:   Procedure Laterality Date   • ARTERIOVENOUS FISTULA     • BREAST BIOPSY     • CARPAL TUNNEL RELEASE     • CHOLECYSTECTOMY     • TUBAL ABDOMINAL LIGATION         Family History  Family History   Problem Relation Age of Onset   • Cancer Other    • Hypertension Other    • Kidney disease Other    • Heart disease Other    • Diabetes Other    • Diabetes Mother    • Diabetes Maternal Aunt    • Diabetes Maternal Grandmother        Social History  Social History     Socioeconomic History   • Marital status:      Spouse name: Not on file   • Number of children: Not on file   • Years of education: Not on file   • Highest education level: Not on file   Tobacco Use   • Smoking status: Current Every Day Smoker     Packs/day: 0.50   • Smokeless tobacco: Never Used   • Tobacco comment: CUTTING BACK AND TRYING TO STOP SMOKING   Substance and Sexual Activity   • Alcohol use: No   • Drug use: No   • Sexual activity: Defer         Review of Systems:  History obtained from chart review and the patient  General ROS: No fever or chills  Respiratory ROS: No cough, shortness of breath, wheezing  Cardiovascular ROS: No chest pain or palpitations  Gastrointestinal ROS: No abdominal pain or melena  Genito-Urinary ROS: No dysuria or hematuria  Neurological ROS: no headache or dizziness    Objective:  Patient Vitals for the past 24 hrs:   BP Temp Temp src Pulse Resp SpO2 Height Weight   08/19/21 0747 (!) 189/85 98.5 °F (36.9 °C) Oral 66 16 98 % -- --   08/19/21 0645 (!) 220/88 -- -- -- -- -- -- --   08/19/21 0545 -- -- -- 64 -- -- -- --   08/19/21  "0532 (!) 220/90 -- -- -- -- -- -- --   08/19/21 0355 (!) 194/86 -- -- -- -- -- -- --   08/19/21 0353 -- 98 °F (36.7 °C) Oral 64 16 100 % -- 61.2 kg (134 lb 14.4 oz)   08/18/21 2340 174/74 98.2 °F (36.8 °C) Oral 61 16 100 % -- --   08/18/21 1900 154/63 98 °F (36.7 °C) Oral 63 16 99 % -- --   08/18/21 1726 154/72 -- -- 66 -- -- -- --   08/18/21 1500 145/65 98.2 °F (36.8 °C) Oral 66 16 99 % -- --   08/18/21 1301 96/57 98.3 °F (36.8 °C) Oral 69 18 99 % 170.2 cm (67.01\") 66.4 kg (146 lb 6.2 oz)   08/18/21 1000 149/72 -- -- 68 -- 100 % -- --   08/18/21 0945 149/74 -- -- 67 -- 100 % -- --   08/18/21 0930 147/82 -- -- 68 -- 90 % -- --       Intake/Output Summary (Last 24 hours) at 8/19/2021 0926  Last data filed at 8/18/2021 1718  Gross per 24 hour   Intake 480 ml   Output 3000 ml   Net -2520 ml     General: awake/alert    Neck: supple, no JVD  Chest:  clear to auscultation bilaterally without respiratory distress  CVS: regular rate and rhythm  Abdominal: soft, nontender, positive bowel sounds  Extremities: no cyanosis or edema  Skin: warm and dry without rash   Neuro: no focal motor deficits      Labs:  Results from last 7 days   Lab Units 08/19/21  0430 08/18/21  0032 08/17/21  1431   WBC 10*3/mm3 4.76 5.37 6.75   HEMOGLOBIN g/dL 8.4* 8.0* 8.8*   HEMATOCRIT % 26.4* 23.5* 25.8*   PLATELETS 10*3/mm3 156 128* 137*         Results from last 7 days   Lab Units 08/19/21  0430 08/18/21  0246 08/17/21  2024 08/17/21  1743 08/17/21  1743 08/17/21  1431 08/17/21  1431   SODIUM mmol/L 133* 133*  --   --  117*   < > 110*   POTASSIUM mmol/L 5.3* 4.3  --   --  5.6*   < > 7.3*   CHLORIDE mmol/L 94* 96*  --   --  79*   < > 72*   CO2 mmol/L 26.0 27.0  --   --  21.0*   < > 18.0*   BUN mg/dL 29* 42*  --   --  76*   < > 86*   CREATININE mg/dL 3.18* 3.47*  --   --  4.68*   < > 5.22*   CALCIUM mg/dL 8.6 9.2  --   --  8.7   < > 8.9   BILIRUBIN mg/dL 0.3 0.4  --   --   --   --  0.6   ALK PHOS U/L 102 102  --   --   --   --  116   ALT (SGPT) U/L " 9 11  --   --   --   --  8   AST (SGOT) U/L 16 21  --   --   --   --  12   GLUCOSE mg/dL 295* 67 373*   < > 911*   < > 1,126*    < > = values in this interval not displayed.       Radiology:   Imaging Results (Last 72 Hours)     Procedure Component Value Units Date/Time    XR Chest 1 View [[id number]] Collected: 08/17/21 1608     Updated: 08/17/21 1615    Narrative:      EXAM: XR CHEST 1 VW-     HISTORY: weakness; R73.9-Hyperglycemia, unspecified; E87.5-Hyperkalemia;  E87.9-Qqvz-kxgvjjwjua and hyponatremia; R53.1-Weakness       COMPARISON: June 19, 2021.     TECHNIQUE: Frontal radiograph of the chest     FINDINGS:   Bilateral interstitial infiltrates are present. This has appearance of  pulmonary vascular congestion.. Cardiac silhouette is mildly enlarged.  Left subclavian endovascular stent graft is present..      The osseous structures and surrounding soft tissues demonstrate no acute  abnormality.          Impression:      1. Cardiomegaly with bilateral interstitial infiltrates most likely  representing mild or early changes of pulmonary vascular congestion.        This report was finalized on 08/17/2021 16:11 by Dr. Lzaaro Martinez MD.          Culture:  No results found for: BLOODCX, URINECX, WOUNDCX, MRSACX, RESPCX, STOOLCX      Assessment   1.  End stage renal disease on HD   2.  Essential hypertension  3.  Hyponatremia  4.  Hyperkalemia  5.  Anemia of CKD  6.  Acute pulmonary edema--improved  7.  Secondary hyperparathyroidism  8.  Hyperglycemia--improved    Plan:  1.  Dialysis today  2.  Monitor blood pressure after HD completed, titrate medications PRN  3.  Monitor labs      Delmar Salazar, GAEL  8/19/2021  09:26 CDT

## 2021-08-20 NOTE — DISCHARGE PLACEMENT REQUEST
"Chemo Upton (51 y.o. Female)     Date of Birth Social Security Number Address Home Phone MRN    1970  [id number] [address] [phone] [id number]    Latter-day Marital Status          Other        Admission Date Admission Type Admitting Provider Attending Provider Department, Room/Bed    8/17/21 Emergency Narinder Madrid MD Truong, Khai C, MD [facility] 4B, 445/1    Discharge Date Discharge Disposition Discharge Destination         Home or Self Care              Attending Provider: Narinder Madrid MD    Allergies: Lamisil [Terbinafine], Penicillins, Reglan [Metoclopramide], Stadol [Butorphanol]    Isolation: None   Infection: None   Code Status: CPR    Ht: 170.2 cm (67.01\")   Wt: 60.4 kg (133 lb 1.6 oz)    Admission Cmt: None   Principal Problem: None                Active Insurance as of 8/17/2021     Primary Coverage     Payor Plan Insurance Group Employer/Plan Group    [facility] MEDICARE REPLACEMENT [employer] MEDICARE REPLACEMENT      Payor Plan Address Payor Plan Phone Number Payor Plan Fax Number Effective Dates    [address] [address] [phone]  6/1/2021 - None Entered    [facility] [address]       Subscriber Name Subscriber Birth Date Member ID       CHEMO UPTON 1970 [id number]           Secondary Coverage     Payor Plan Insurance Group Employer/Plan Group    [place]      Payor Plan Address Payor Plan Phone Number Payor Plan Fax Number Effective Dates    [address] [phone]  8/17/2021 - None Entered    [facility] [id number]       Subscriber Name Subscriber Birth Date Member ID       CHEMO UPTON 1970 [id number]                 Emergency Contacts      (Rel.) Home Phone Work Phone Mobile Phone    Dmitriy Galvez (Friend) -- -- [phone]    Kassandra Paez (Sister) [phone] -- [phone]        Wheelchair [[id number]] (Order [id number])  Order  Date: 8/20/2021 Department: [address] " Ordering/Authorizing: Narinder Madrid MD   Order History  Outpatient  Date/Time Action Taken User Additional Information   08/20/21 1504 Sign Narinder Madrid MD    Order Details    Frequency Duration Priority Order Class   None None Routine External   Start Date/Time    Start Date   08/20/21   Order Information    Order Date Service Start Date Start Time   08/20/21 Medicine 08/20/21    Reference Links    Associated Reports   View Encounter   Order Questions    Question Answer Comment   Wheelchair accessories  Manual W/C Seat Widths 20-23 inches    Length of Need (99 Months = Lifetime) 99 Months = Lifetime    Note:  Custom values to enter length of need for DME          Source Order Set / Preference List    Order Set   [facility] GEN EXPRESS DISCHARGE [[id number]]   Clinical Indications     ICD-10-CM ICD-9-CM   Hyperglycemia  - Primary     R73.9 790.29   Hyperkalemia     E87.5 276.7   Hyponatremia     E87.1 276.1   Generalized weakness     R53.1 780.79   Impaired mobility     Z74.09 799.89   Decreased activities of daily living (ADL)     Z78.9 V49.89   Uncontrolled diabetes mellitus of other type with hypoglycemia, unspecified hypoglycemia coma status (CMS/[facility])     E13.649 250.82   Hypertensive urgency     I16.0 401.9   Sacral insufficiency fracture, sequela     M84.48XS 905.1   End stage kidney disease (CMS/[facility])     N18.6 585.6   Seizure (CMS/[facility])     R56.9 780.39   Gastroparesis due to DM (CMS/[facility])     E11.43  K31.84 250.60  536.3   Constipation, unspecified constipation type     K59.00 564.00   Intractable nausea and vomiting     R11.2 536.2   Secondary hypertension     I15.9 405.99   Non-ketotic hyperosmolar coma (CMS/[facility])     E11.01 250.20   CKD stage 4 due to type 2 diabetes mellitus (CMS/[facility])     E11.22  N18.4 250.40  585.4   Reprint Order Requisition    Wheelchair (Order #[id number]) on 8/20/21   Encounter    View Encounter          Order Provider Info        Office phone Pager E-mail   Ordering User Julius  Narinder COOLEY [phone] -- --   Authorizing Provider Narinder Madrid [phone] -- --   Attending Provider Narinder Madrid [phone] -- --   Linked Charges    No charges linked to this procedure   Tracking Reports  Cosign Tracking Order Transmittal Tracking   Authorized by:  Narinder Madrid MD  (NPI: [id number])       Lab Component SmartPhrase Guide    Wheelchair (Order #[id number]) on 8/20/21            History & Physical      Narinder Madrid MD at 08/17/21 1617              [facility] Medicine Services  HISTORY AND PHYSICAL    Date of Admission: 8/17/2021  Primary Care Physician: Norm Wiggins APRN    Subjective     Chief Complaint: Hyperkalemia.  Renal failure on dialysis.  Hyperglycemia.    History of Present Illness  Patient is a 51-year-old  female came to the ER for evaluation of hyperglycemia and weakness.  Patient has missed his dialysis appointment.  ER physician discussed plan for dialysis today.  Patient is a chronic diabetes, glucose was in the thousand in ER.  Patient will have dialysis Tuesday Thursday and Saturday.  Patient missed Tuesday only.  Patient states she feels weak all day long and check her sugar is too high to read.  Patient was so weak to get around family proceeded to call 911.  Patient was brought to ER to be evaluated.  Patient is on chronic dialysis.    Review of Systems   Constitutional: Positive for activity change, appetite change and fatigue. Negative for chills and fever.   HENT: Negative for hearing loss, nosebleeds, tinnitus and trouble swallowing.    Eyes: Negative for visual disturbance.   Respiratory: Positive for shortness of breath and wheezing. Negative for cough and chest tightness.    Cardiovascular: Positive for leg swelling. Negative for chest pain and palpitations.   Gastrointestinal: Negative for abdominal distention, abdominal pain, blood in stool, constipation, diarrhea, nausea and vomiting.   Endocrine:  Negative for cold intolerance, heat intolerance, polydipsia, polyphagia and polyuria.   Genitourinary: Negative for decreased urine volume, difficulty urinating, dysuria, flank pain, frequency and hematuria.   Musculoskeletal: Negative for arthralgias, joint swelling and myalgias.   Skin: Negative for rash.   Allergic/Immunologic: Negative for immunocompromised state.   Neurological: Positive for weakness. Negative for dizziness, syncope, light-headedness and headaches.   Hematological: Negative for adenopathy. Does not bruise/bleed easily.   Psychiatric/Behavioral: Negative for confusion and sleep disturbance. The patient is not nervous/anxious.         Otherwise complete ROS reviewed and negative except as mentioned in the HPI.      Past Medical History:   Past Medical History:   Diagnosis Date   • Anxiety    • Arthritis    • Chronic kidney disease     STAGE V RENAL FAILURE   • Depression    • Diabetes mellitus (CMS/HCC)    • HTN (hypertension)    • Hypothyroidism    • Obesity    • Tremors of nervous system        Past Surgical History:  Past Surgical History:   Procedure Laterality Date   • ARTERIOVENOUS FISTULA     • BREAST BIOPSY     • CARPAL TUNNEL RELEASE     • CHOLECYSTECTOMY     • TUBAL ABDOMINAL LIGATION         Family History: family history includes Cancer in an other family member; Diabetes in her maternal aunt, maternal grandmother, mother, and another family member; Heart disease in an other family member; Hypertension in an other family member; Kidney disease in an other family member.    Social History:  reports that she has been smoking. She has been smoking about 0.50 packs per day. She has never used smokeless tobacco. She reports that she does not drink alcohol and does not use drugs.    Medications:  Prior to Admission medications    Medication Sig Start Date End Date Taking? Authorizing Provider   albuterol (PROVENTIL HFA;VENTOLIN HFA) 108 (90 BASE) MCG/ACT inhaler Inhale 2 puffs Every 4  (Four) Hours As Needed for wheezing.    Blanche Neil MD   carvedilol (COREG) 12.5 MG tablet Take 1 tablet by mouth 2 (Two) Times a Day With Meals. 6/29/21   Adán Merrill DO   cloNIDine (CATAPRES) 0.2 MG tablet Take 1 tablet by mouth Every 8 (Eight) Hours. 6/29/21   Adán Merrill DO   DULoxetine (CYMBALTA) 60 MG capsule Take 60 mg by mouth daily.    Blanche Neil MD   gabapentin (NEURONTIN) 300 MG capsule Take 1 capsule by mouth Every Night for 3 days. 6/29/21 7/2/21  Adán Merrill DO   hydrALAZINE (APRESOLINE) 100 MG tablet Take 1 tablet by mouth 3 (Three) Times a Day. 6/29/21   Adán Merrill DO   insulin detemir (LEVEMIR) 100 UNIT/ML injection Inject 15 Units under the skin into the appropriate area as directed Daily. 6/30/21   Adán Merrill DO   lactulose (CHRONULAC) 10 GM/15ML solution Take 20 g by mouth 3 (Three) Times a Day.    Blanche Niel MD   levothyroxine (SYNTHROID, LEVOTHROID) 137 MCG tablet Take 1 tablet by mouth Daily. 6/30/21   Adán Merrill DO   lisinopril (PRINIVIL,ZESTRIL) 20 MG tablet Take 20 mg by mouth 2 (two) times a day.    Blanche Neil MD   NIFEdipine XL (PROCARDIA XL) 90 MG 24 hr tablet Take 1 tablet by mouth Daily. 6/30/21   Adán Merrill DO   ondansetron (ZOFRAN) 4 MG tablet Take 4 mg by mouth Every 8 (Eight) Hours As Needed for nausea or vomiting.    Blanche Neil MD   orphenadrine (NORFLEX) 100 MG 12 hr tablet Take 1 tablet by mouth Every 12 (Twelve) Hours for 3 days. 6/29/21 7/2/21  Adán Merrill DO   rOPINIRole (REQUIP) 4 MG tablet Take 4 mg by mouth Every Night.    Blanche Neil MD   senna (SENOKOT) 8.6 MG tablet tablet Take 1 tablet by mouth Every Night.    Blanche Neil MD   simvastatin (ZOCOR) 20 MG tablet Take 20 mg by mouth Daily.    Provider, Historical, MD     Allergies:  Allergies   Allergen Reactions   • Lamisil [Terbinafine]    • Penicillins    • Reglan [Metoclopramide] GI Intolerance  "  • Stadol [Butorphanol] Nausea And Vomiting       Objective     Vital Signs: /90   Pulse 63   Temp 97.4 °F (36.3 °C) (Oral)   Resp 18   Ht 167.6 cm (66\")   Wt 63.5 kg (140 lb)   LMP  (LMP Unknown)   SpO2 100%   BMI 22.60 kg/m²   Physical Exam  Vitals and nursing note reviewed.   Constitutional:       Appearance: She is well-developed.   HENT:      Head: Normocephalic.   Eyes:      Conjunctiva/sclera: Conjunctivae normal.      Pupils: Pupils are equal, round, and reactive to light.   Neck:      Vascular: No JVD.   Cardiovascular:      Rate and Rhythm: Normal rate and regular rhythm.      Heart sounds: Normal heart sounds. No murmur heard.   No friction rub. No gallop.    Pulmonary:      Effort: No respiratory distress.      Breath sounds: No wheezing or rales.      Comments: Diminished breath sound bilateral, clear  Chest:      Chest wall: No tenderness.   Abdominal:      General: Bowel sounds are normal. There is no distension.      Palpations: Abdomen is soft.      Tenderness: There is no abdominal tenderness. There is no guarding or rebound.   Musculoskeletal:         General: No tenderness or deformity. Normal range of motion.      Cervical back: Neck supple.   Skin:     General: Skin is warm and dry.      Capillary Refill: Capillary refill takes 2 to 3 seconds.      Findings: No rash.   Neurological:      Mental Status: She is alert and oriented to person, place, and time.      Cranial Nerves: No cranial nerve deficit.      Motor: Weakness present. No abnormal muscle tone.      Deep Tendon Reflexes: Reflexes normal.   Psychiatric:         Behavior: Behavior normal.         Thought Content: Thought content normal.         Judgment: Judgment normal.              Results Reviewed:    Lab Results (last 24 hours)     Procedure Component Value Units Date/Time    COVID-19,[facility] IN-HOUSE,Nasal Swab No Transport Media 3-4 HR TAT - Swab, Nasal Cavity [[id number]]  (Normal) Collected: 08/17/21 1548    " Specimen: Swab from Nasal Cavity Updated: 08/17/21 1642     COVID19 Not Detected    Narrative:      Fact sheet for providers: https://www.fda.gov/media/103373/download     Fact sheet for patients: https://www.fda.gov/media/211920/download    Test performed by PCR.    Consider negative results in combination with clinical observations, patient history, and epidemiological information.    Blood Gas, Arterial - [[id number]]  (Abnormal) Collected: 08/17/21 1550    Specimen: Arterial Blood Updated: 08/17/21 1559     Site Right Radial     Aaron's Test N/A     pH, Arterial 7.370 pH units      pCO2, Arterial 36.8 mm Hg      pO2, Arterial 61.6 mm Hg      Comment: 84 Value below reference range        HCO3, Arterial 21.2 mmol/L      Base Excess, Arterial -3.7 mmol/L      Comment: 84 Value below reference range        O2 Saturation, Arterial 89.8 %      Comment: 84 Value below reference range        Temperature 37.0 C      Barometric Pressure for Blood Gas 748 mmHg      Modality Room Air     Ventilator Mode NA     Collected by [id number]     Comment: Meter: [id number]     :  [id number]        pCO2, Temperature Corrected 36.8 mm Hg      pH, Temp Corrected 7.370 pH Units      pO2, Temperature Corrected 61.6 mm Hg     Osmolality, Serum [[id number]]  (Abnormal) Collected: 08/17/21 1432    Specimen: Blood Updated: 08/17/21 1555     Osmolality 331 mOsm/kg     Phosphorus [[id number]]  (Abnormal) Collected: 08/17/21 1431    Specimen: Blood Updated: 08/17/21 1554     Phosphorus 5.4 mg/dL     [place] Draw [[id number]] Collected: 08/17/21 1431    Specimen: Blood Updated: 08/17/21 1545    Narrative:      The following orders were created for panel order [place] Draw.  Procedure                               Abnormality         Status                     ---------                               -----------         ------                     Green Top (Gel)[[id number]]                                  Final result               Lavender  Top[[id number]]                                     Final result               Red Top[[id number]]                                          Final result                 Please view results for these tests on the individual orders.    Green Top (Gel) [[id number]] Collected: 08/17/21 1431    Specimen: Blood Updated: 08/17/21 1545     Extra Tube Hold for add-ons.     Comment: Auto resulted.       Red Top [[id number]] Collected: 08/17/21 1432    Specimen: Blood Updated: 08/17/21 1545     Extra Tube Hold for add-ons.     Comment: Auto resulted.       Lavender Top [[id number]] Collected: 08/17/21 1431    Specimen: Blood Updated: 08/17/21 1545     Extra Tube hold for add-on     Comment: Auto resulted       Comprehensive Metabolic Panel [[id number]]  (Abnormal) Collected: 08/17/21 1431    Specimen: Blood Updated: 08/17/21 1522     Glucose 1,126 mg/dL      BUN 86 mg/dL      Creatinine 5.22 mg/dL      Sodium 110 mmol/L      Potassium 7.3 mmol/L      Chloride 72 mmol/L      CO2 18.0 mmol/L      Calcium 8.9 mg/dL      Total Protein 7.2 g/dL      Albumin 4.10 g/dL      ALT (SGPT) 8 U/L      AST (SGOT) 12 U/L      Alkaline Phosphatase 116 U/L      Total Bilirubin 0.6 mg/dL      eGFR Non African Amer 9 mL/min/1.73      Comment: <15 Indicative of kidney failure.        eGFR   Amer --     Comment: <15 Indicative of kidney failure.        Globulin 3.1 gm/dL      A/G Ratio 1.3 g/dL      BUN/Creatinine Ratio 16.5     Anion Gap 20.0 mmol/L     Narrative:      GFR Normal >60  Chronic Kidney Disease <60  Kidney Failure <15      Troponin [[id number]]  (Abnormal) Collected: 08/17/21 1431    Specimen: Blood Updated: 08/17/21 1509     Troponin T 0.186 ng/mL     Narrative:      Troponin T Reference Range:  <= 0.03 ng/mL-   Negative for AMI  >0.03 ng/mL-     Abnormal for myocardial necrosis.  Clinicians would have to utilize clinical acumen, EKG, Troponin and serial changes to determine if it is an Acute Myocardial Infarction or myocardial  injury due to an underlying chronic condition.       Results may be falsely decreased if patient taking Biotin.      Magnesium [[id number]]  (Normal) Collected: 08/17/21 1431    Specimen: Blood Updated: 08/17/21 1502     Magnesium 2.6 mg/dL     CBC & Differential [[id number]]  (Abnormal) Collected: 08/17/21 1431    Specimen: Blood Updated: 08/17/21 1455    Narrative:      The following orders were created for panel order CBC & Differential.  Procedure                               Abnormality         Status                     ---------                               -----------         ------                     CBC Auto Differential[[id number]]        Abnormal            Final result                 Please view results for these tests on the individual orders.    CBC Auto Differential [[id number]]  (Abnormal) Collected: 08/17/21 1431    Specimen: Blood Updated: 08/17/21 1455     WBC 6.75 10*3/mm3      RBC 2.99 10*6/mm3      Hemoglobin 8.8 g/dL      Hematocrit 25.8 %      MCV 86.3 fL      MCH 29.4 pg      MCHC 34.1 g/dL      RDW 14.3 %      RDW-SD 45.0 fl      MPV 10.8 fL      Platelets 137 10*3/mm3      Neutrophil % 79.0 %      Lymphocyte % 11.0 %      Monocyte % 8.0 %      Eosinophil % 0.4 %      Basophil % 1.3 %      Immature Grans % 0.3 %      Neutrophils, Absolute 5.33 10*3/mm3      Lymphocytes, Absolute 0.74 10*3/mm3      Monocytes, Absolute 0.54 10*3/mm3      Eosinophils, Absolute 0.03 10*3/mm3      Basophils, Absolute 0.09 10*3/mm3      Immature Grans, Absolute 0.02 10*3/mm3      nRBC 0.0 /100 WBC            Radiology Data:    Imaging Results (Last 24 Hours)     Procedure Component Value Units Date/Time    XR Chest 1 View [[id number]] Collected: 08/17/21 1608     Updated: 08/17/21 1615    Narrative:      EXAM: XR CHEST 1 VW-     HISTORY: weakness; R73.9-Hyperglycemia, unspecified; E87.5-Hyperkalemia;  E87.0-Kqic-cevmmrfzri and hyponatremia; R53.1-Weakness       COMPARISON: June 19, 2021.     TECHNIQUE: Frontal  radiograph of the chest     FINDINGS:   Bilateral interstitial infiltrates are present. This has appearance of  pulmonary vascular congestion.. Cardiac silhouette is mildly enlarged.  Left subclavian endovascular stent graft is present..      The osseous structures and surrounding soft tissues demonstrate no acute  abnormality.          Impression:      1. Cardiomegaly with bilateral interstitial infiltrates most likely  representing mild or early changes of pulmonary vascular congestion.        This report was finalized on 08/17/2021 16:11 by Dr. Lazaro Martinez MD.          I have personally reviewed and interpreted the radiology studies and ECG obtained at time of admission.     Assessment / Plan      Assessment & Plan  Active Hospital Problems    Diagnosis    • Hyperkalemia    • Uncontrolled diabetes mellitus (CMS/HCC)    • End stage kidney disease (CMS/HCC)    • Hyperglycemia    • Gastroparesis due to DM (CMS/HCC)    • Intractable nausea and vomiting      Plan.    Hyperglycemia.  Plan for dialysis now stat.  Patient start insulin drip in ER.    Chronic renal failure on dialysis.  Plan for dialysis today.    Hyperkalemia.  After dialysis now stat.    Nausea/vomiting.  Pepcid.  Zofran.    Hypertension/hyperlipidemia.  Coreg.  Catapres.  Hydralazine.  Hold lisinopril due to hyperkalemia.  Procardia.  Zocor.    Anxiety/depression.  Cymbalta.    Neuropathy.  Neurontin.  Requip at night.    Chronic smoker.  Nicotine patch.  Discussed patient cutting back and stopping.    Constipation . Lactulose.  Senokot.    Hypothyroidism.  Synthroid.    Code Status: full code     I discussed the patient's findings and my recommendations with: patient    Estimated length of stay: 2-4 days.     Electronically signed by Narinder Madrid MD, 08/17/21, 4:17 PM CDT.              Electronically signed by Narinder Madrid MD at 08/17/21 5193

## 2021-08-20 NOTE — CASE MANAGEMENT/SOCIAL WORK
Continued Stay Note  [facility]     Patient Name: Ena Upton  MRN: [id number]  Today's Date: 8/20/2021    Admit Date: 8/17/2021    Discharge Plan     Row Name 08/20/21 1259       Plan    Plan Comments  [facility] cannot offer a bed on pt.  SW will follow to hear from other SNFs.    Row Name 08/20/21 1027       Plan    Plan  SNF    Provided Post Acute Provider List?  Yes    Post Acute Provider List  Nursing Home    Delivered To  Patient    Method of Delivery  In person    Patient/Family in Agreement with Plan  yes    Plan Comments  Pt agrees to referrals at [facility], [facility] and [facility].  SW will follow for bed offers/denials.        Discharge Codes    No documentation.             DUSTIN EstrellaW

## 2021-08-20 NOTE — THERAPY TREATMENT NOTE
Acute Care - Occupational Therapy Treatment Note  [facility]     Patient Name: Ena Upton  : 1970  MRN: [id number]  Today's Date: 2021             Admit Date: 2021       ICD-10-CM ICD-9-CM   1. Hyperglycemia  R73.9 790.29   2. Hyperkalemia  E87.5 276.7   3. Hyponatremia  E87.1 276.1   4. Generalized weakness  R53.1 780.79   5. Impaired mobility  Z74.09 799.89   6. 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)   • Non-ketotic hyperosmolar coma (CMS/HCC)   • HTN (hypertension)   • Intractable nausea and vomiting   • Constipation   • Gastroparesis due to DM (CMS/HCC)   • Hyperglycemia   • Seizure (CMS/HCC)   • End stage kidney disease (CMS/HCC)   • Sacral insufficiency fracture   • Hypertensive urgency   • Uncontrolled diabetes mellitus (CMS/HCC)   • Hyperkalemia     Past Medical History:   Diagnosis Date   • Anxiety    • Arthritis    • Chronic kidney disease     STAGE V RENAL FAILURE   • Depression    • Diabetes mellitus (CMS/HCC)    • HTN (hypertension)    • Hypothyroidism    • Obesity    • Tremors of nervous system      Past Surgical History:   Procedure Laterality Date   • ARTERIOVENOUS FISTULA     • BREAST BIOPSY     • CARPAL TUNNEL RELEASE     • CHOLECYSTECTOMY     • TUBAL ABDOMINAL LIGATION              OT ASSESSMENT FLOWSHEET (last 12 hours)      OT Evaluation and Treatment     Row Name 21 0956                   OT Time and Intention    Subjective Information  complains of;weakness;fatigue  -CJ        Document Type  therapy note (daily note)  -        Mode of Treatment  occupational therapy  -        Patient Effort  good  -           General Information    Existing Precautions/Restrictions  fall  -           Pain Assessment    Additional Documentation  Pain Scale: Word Pre/Post-Treatment (Group)  -CJ           Pain Scale: Numbers Pre/Post-Treatment    Pain  Intervention(s)  Rest  -           Functional Mobility    Functional Mobility- Comment  up in chair!  -CJ           Motor Skills    Motor Skills  therapeutic exercise  -        Therapeutic Exercise  shoulder;elbow/forearm  -           Shoulder (Therapeutic Exercise)    Shoulder (Therapeutic Exercise)  AROM (active range of motion);strengthening exercise  -        Shoulder AROM (Therapeutic Exercise)  bilateral;flexion;extension;sitting;10 repetitions;2 sets  -CJ        Shoulder Strengthening (Therapeutic Exercise)  bilateral;flexion;extension;sitting;2 lb free weight;3 lb free weight;10 repetitions;2 sets  -CJ           Elbow/Forearm (Therapeutic Exercise)    Elbow/Forearm (Therapeutic Exercise)  AROM (active range of motion);strengthening exercise  -CJ        Elbow/Forearm AROM (Therapeutic Exercise)  bilateral;flexion;extension;sitting;10 repetitions;2 sets  -CJ        Elbow/Forearm Strengthening (Therapeutic Exercise)  bilateral;flexion;extension;sitting;2 lb free weight;3 lb free weight;10 repetitions;2 sets  -CJ           Positioning and Restraints    Pre-Treatment Position  sitting in chair/recliner  -CJ        Post Treatment Position  chair  -CJ        In Chair  sitting;call light within reach;encouraged to call for assist  -           Progress Summary (OT)    Progress Toward Functional Goals (OT)  progress toward functional goals is good  -        Barriers to Overall Progress (OT)  Fall!  -CJ        Impairments Still Limiting Function (OT)  continue with ot poc!  -          User Key  (r) = Recorded By, (t) = Taken By, (c) = Cosigned By    Initials Name Effective Dates    Zack Whipple COTA 06/16/21 -            Occupational Therapy Education                 Title: PT OT SLP Therapies (In Progress)     Topic: Occupational Therapy (Done)     Point: ADL training (Done)     Description:   Instruct learner(s) on proper safety adaptation and remediation techniques during self care or  transfers.   Instruct in proper use of assistive devices.              Learning Progress Summary           Patient Acceptance, E, VU by  at 8/18/2021 1544                   Point: Home exercise program (Done)     Description:   Instruct learner(s) on appropriate technique for monitoring, assisting and/or progressing therapeutic exercises/activities.              Learning Progress Summary           Patient Acceptance, E,TB, VU,DU,NR by  at 8/20/2021 0956    Comment: Pt. performs ue exs to increase her act. xochilt with adl tasks!    Acceptance, E,TB, VU,DU,NR by  at 8/19/2021 1430    Comment: Pt. sitting in chair, performed ue exs to increase her act. xochilt and adl performance!                   Point: Precautions (Done)     Description:   Instruct learner(s) on prescribed precautions during self-care and functional transfers.              Learning Progress Summary           Patient Acceptance, E,TB, VU,DU,NR by  at 8/20/2021 0956    Comment: Pt. performs ue exs to increase her act. xochilt with adl tasks!    Acceptance, E,TB, VU,DU,NR by  at 8/19/2021 1430    Comment: Pt. sitting in chair, performed ue exs to increase her act. xochilt and adl performance!    Acceptance, E, VU by  at 8/18/2021 1544                   Point: Body mechanics (Done)     Description:   Instruct learner(s) on proper positioning and spine alignment during self-care, functional mobility activities and/or exercises.              Learning Progress Summary           Patient Acceptance, E,TB, VU,DU,NR by  at 8/20/2021 0956    Comment: Pt. performs ue exs to increase her act. xochilt with adl tasks!    Acceptance, E,TB, VU,DU,NR by  at 8/19/2021 1430    Comment: Pt. sitting in chair, performed ue exs to increase her act. xochilt and adl performance!                               User Key     Initials Effective Dates Name Provider Type Discipline     06/16/21 -  Zack Tafoya COTA Occupational Therapy Assistant OT     06/16/21 -  Neela  KLAUDIA PhippsR/L Occupational Therapist OT                  OT Recommendation and Plan     Progress Toward Functional Goals (OT): progress toward functional goals is good  Plan of Care Review  Plan of Care Reviewed With: patient  Progress: improving  Outcome Summary: Pt. performs ue exs to increase her act. xochilt during adl tasks!  Plan of Care Reviewed With: patient  Outcome Summary: Pt. performs ue exs to increase her act. xochilt during adl tasks!    Outcome Measures     Row Name 08/20/21 0956 08/19/21 1430          How much help from another is currently needed...    Putting on and taking off regular lower body clothing?  2  -CJ  2  -CJ     Bathing (including washing, rinsing, and drying)  2  -CJ  2  -CJ     Toileting (which includes using toilet bed pan or urinal)  2  -CJ  2  -CJ     Putting on and taking off regular upper body clothing  3  -CJ  3  -CJ     Taking care of personal grooming (such as brushing teeth)  4  -CJ  4  -CJ     Eating meals  4  -CJ  4  -CJ     AM-PAC 6 Clicks Score (OT)  17  -CJ  17  -        Functional Assessment    Outcome Measure Options  AM-PAC 6 Clicks Daily Activity (OT)  -CJ  AM-PAC 6 Clicks Daily Activity (OT)  -       User Key  (r) = Recorded By, (t) = Taken By, (c) = Cosigned By    Initials Name Provider Type    Zack Whipple COTA Occupational Therapy Assistant          Time Calculation:   Time Calculation- OT     Row Name 08/20/21 1330 08/20/21 0956          Time Calculation- OT    OT Start Time  --  0956  -     OT Stop Time  --  1023  -     OT Time Calculation (min)  --  27 min  -     Total Timed Code Minutes- OT  --  27 minute(s)  -     OT Received On  --  08/20/21  -        Timed Charges    73093 - OT Therapeutic Exercise Minutes  --  27  -CJ     73571 - Gait Training Minutes   10  -MF  --        Total Minutes    Timed Charges Total Minutes  10  -MF  27  -CJ      Total Minutes  10  -MF  27  -CJ       User Key  (r) = Recorded By, (t) = Taken By, (c) = Cosigned  By    Initials Name Provider Type    CJ Zack aTfoya COTA Occupational Therapy Assistant    Amada Kirby, JOSE Physical Therapy Assistant        Therapy Charges for Today     Code Description Service Date Service Provider Modifiers Qty    57316749517 HC OT THER PROC EA 15 MIN 8/19/2021 Zack Tafoya COTA GO 2    02767136232 HC OT THER PROC EA 15 MIN 8/20/2021 Zack Tafoya COTA GO 2               RACHEL Santiago  8/20/2021

## 2021-08-20 NOTE — PLAN OF CARE
Problem: Adult Inpatient Plan of Care  Goal: Plan of Care Review  Outcome: Ongoing, Progressing  Flowsheets  Taken 8/20/2021 1641 by Becka Fernandez, RN  Outcome Summary: Pt A&Ox4. Sinus HR: 61-65 on tele. Blood glucose monitoring. Pt c/o pain, prn PO pain medicine given with good relief. Pt up in the chair this AM & afternoon. Pt worked with PT/OT today. Up x standby with a walker. Pt plans to be discharged to home today with home health. Safety maintained.  Taken 8/20/2021 1155 by Amada Paige, JOSE  Progress: improving

## 2021-08-20 NOTE — PROGRESS NOTES
Nephrology ([facility]) Progress Note      Patient:  Ena Upton  YOB: 1970  Date of Service: 8/20/2021  MRN: [id number]   Acct: [id number]   Primary Care Physician: Norm Wiggins APRN  Advance Directive:   Code Status and Medical Interventions:   Ordered at: 08/17/21 1653     Level Of Support Discussed With:    Patient     Code Status:    CPR     Medical Interventions (Level of Support Prior to Arrest):    Full     Admit Date: 8/17/2021       Hospital Day: 3  Referring Provider: Marcello Lagos MD      Patient personally seen and examined.  Complete chart including Consults, Notes, Operative Reports, Labs, Cardiology, and Radiology studies reviewed as able.        Subjective:  nEa Upton is a 51 y.o. female  whom we were consulted for end stage renal disease. Maintenance hemodialysis on Tuesday Thursday Saturday. Did not go to outpatient treatment on Tuesday, 8/17 due to conflict with a doctor's appointment. Presented to ER later on that same day due to weakness. Found to have blood glucose >1000, potassium 7.3.  Admitted for emergent hemodialysis. Received HD late on 8/17 with improvement post treatment    Today is feeling better but remains weak. Blood pressure better following dialysis yesterday,    Allergies:  Lamisil [terbinafine], Penicillins, Reglan [metoclopramide], and Stadol [butorphanol]    Home Meds:  Medications Prior to Admission   Medication Sig Dispense Refill Last Dose   • albuterol (PROVENTIL HFA;VENTOLIN HFA) 108 (90 BASE) MCG/ACT inhaler Inhale 2 puffs Every 4 (Four) Hours As Needed for wheezing.   Unknown at Unknown time   • carvedilol (COREG) 12.5 MG tablet Take 1 tablet by mouth 2 (Two) Times a Day With Meals.   Unknown at Unknown time   • cloNIDine (CATAPRES) 0.2 MG tablet Take 1 tablet by mouth Every 8 (Eight) Hours.   Unknown at Unknown time   • cloNIDine (CATAPRES-TTS) 0.3 MG/24HR patch Place 1 patch on the skin as directed by  provider 1 (One) Time Per Week.   Unknown at Unknown time   • DULoxetine (CYMBALTA) 60 MG capsule Take 60 mg by mouth daily.   Unknown at Unknown time   • hydrALAZINE (APRESOLINE) 50 MG tablet Take 50 mg by mouth 3 (Three) Times a Day.   Unknown at Unknown time   • lactulose (CHRONULAC) 10 GM/15ML solution Take 20 g by mouth 3 (Three) Times a Day.   Unknown at Unknown time   • levothyroxine (SYNTHROID, LEVOTHROID) 25 MCG tablet Take 25 mcg by mouth Daily.   Unknown at Unknown time   • lisinopril (PRINIVIL,ZESTRIL) 20 MG tablet Take 20 mg by mouth 2 (two) times a day.   Unknown at Unknown time   • NIFEdipine XL (PROCARDIA XL) 60 MG 24 hr tablet Take 60 mg by mouth 2 (two) times a day.   Unknown at Unknown time   • ondansetron (ZOFRAN) 4 MG tablet Take 4 mg by mouth Every 8 (Eight) Hours As Needed for nausea or vomiting.      • pregabalin (LYRICA) 75 MG capsule Take 75 mg by mouth 2 (Two) Times a Day.   Unknown at Unknown time   • rOPINIRole (REQUIP) 4 MG tablet Take 4 mg by mouth Every Night.   Unknown at Unknown time   • senna (SENOKOT) 8.6 MG tablet tablet Take 1 tablet by mouth Every Night.   Unknown at Unknown time   • simvastatin (ZOCOR) 20 MG tablet Take 20 mg by mouth Daily.   Unknown at Unknown time       Medicines:  Current Facility-Administered Medications   Medication Dose Route Frequency Provider Last Rate Last Admin   • acetaminophen (TYLENOL) tablet 650 mg  650 mg Oral Q6H PRN Narinder Madrid MD   650 mg at 08/19/21 1234   • albuterol (PROVENTIL) nebulizer solution 0.083% 2.5 mg/3mL  2.5 mg Nebulization Q6H PRN Narinder Madrid MD       • atorvastatin (LIPITOR) tablet 10 mg  10 mg Oral Daily Narinder Madrid MD   10 mg at 08/20/21 1016   • carvedilol (COREG) tablet 12.5 mg  12.5 mg Oral BID With Meals Narinder Madrid MD   12.5 mg at 08/20/21 1016   • cloNIDine (CATAPRES) tablet 0.2 mg  0.2 mg Oral Q8H Narinder Madrid MD   0.2 mg at 08/20/21 0527   • dextrose (D50W) 25 g/ 50mL Intravenous Solution 25 g  25  g Intravenous Q15 Min PRN Narinder Madrid MD       • dextrose (GLUTOSE) oral gel 15 g  15 g Oral Q15 Min PRN Narinder Madrid MD       • DULoxetine (CYMBALTA) DR capsule 30 mg  30 mg Oral Daily Narinder Madrid MD   30 mg at 08/20/21 1016   • epoetin yolanda-epbx (RETACRIT) 5,000 Units 2 mL injection  5,000 Units Intravenous Once per day on Mon Wed Fri Narinder Madrid MD   5,000 Units at 08/20/21 1047   • famotidine (PEPCID) tablet 20 mg  20 mg Oral Daily Narinder Madrid MD   20 mg at 08/20/21 1016   • glucagon (human recombinant) (GLUCAGEN DIAGNOSTIC) injection 1 mg  1 mg Subcutaneous Q15 Min PRN Narinder Madrid MD       • hydrALAZINE (APRESOLINE) tablet 50 mg  50 mg Oral TID Narinder Madrid MD   50 mg at 08/20/21 1016   • HYDROcodone-acetaminophen (NORCO) 5-325 MG per tablet 1 tablet  1 tablet Oral Q6H PRN Narinder Madrid MD   1 tablet at 08/20/21 1046   • insulin lispro (humaLOG) injection 2-9 Units  2-9 Units Subcutaneous TID AC Narinder Madrid MD       • lisinopril (PRINIVIL,ZESTRIL) tablet 20 mg  20 mg Oral Q12H Ken Moon MD   20 mg at 08/20/21 1016   • nicotine (NICODERM CQ) 14 MG/24HR patch 1 patch  1 patch Transdermal Q24H Narinder Madrid MD   1 patch at 08/20/21 1017   • NIFEdipine XL (PROCARDIA XL) 24 hr tablet 90 mg  90 mg Oral Q24H Narinder Madrid MD   90 mg at 08/20/21 1016   • ondansetron (ZOFRAN) injection 4 mg  4 mg Intravenous Q6H PRN Narinder Madrid MD   4 mg at 08/20/21 0527   • pregabalin (LYRICA) capsule 75 mg  75 mg Oral Q12H Narinder Madrid MD   75 mg at 08/20/21 1046   • rOPINIRole (REQUIP) tablet 4 mg  4 mg Oral Nightly Narinder Madrid MD   4 mg at 08/19/21 2131   • senna (SENOKOT) tablet 1 tablet  1 tablet Oral Nightly Narinder Madrid MD   1 tablet at 08/19/21 2131   • sodium chloride 0.9 % flush 10 mL  10 mL Intravenous PRN Narinder Madrid MD       • sodium chloride 0.9 % flush 10 mL  10 mL Intravenous Q12H Narinder Madrid MD   10 mL at 08/20/21 1017   • sodium chloride 0.9 %  flush 10 mL  10 mL Intravenous PRN Narinder Madrid MD       • sodium polystyrene (KAYEXALATE) 15 GM/60ML suspension 15 g  15 g Oral Once Narinder Madrid MD           Past Medical History:  Past Medical History:   Diagnosis Date   • Anxiety    • Arthritis    • Chronic kidney disease     STAGE V RENAL FAILURE   • Depression    • Diabetes mellitus (CMS/HCC)    • HTN (hypertension)    • Hypothyroidism    • Obesity    • Tremors of nervous system        Past Surgical History:  Past Surgical History:   Procedure Laterality Date   • ARTERIOVENOUS FISTULA     • BREAST BIOPSY     • CARPAL TUNNEL RELEASE     • CHOLECYSTECTOMY     • TUBAL ABDOMINAL LIGATION         Family History  Family History   Problem Relation Age of Onset   • Cancer Other    • Hypertension Other    • Kidney disease Other    • Heart disease Other    • Diabetes Other    • Diabetes Mother    • Diabetes Maternal Aunt    • Diabetes Maternal Grandmother        Social History  Social History     Socioeconomic History   • Marital status:      Spouse name: Not on file   • Number of children: Not on file   • Years of education: Not on file   • Highest education level: Not on file   Tobacco Use   • Smoking status: Current Every Day Smoker     Packs/day: 0.50   • Smokeless tobacco: Never Used   • Tobacco comment: CUTTING BACK AND TRYING TO STOP SMOKING   Substance and Sexual Activity   • Alcohol use: No   • Drug use: No   • Sexual activity: Defer         Review of Systems:  History obtained from chart review and the patient  General ROS: No fever or chills  Respiratory ROS: No cough, shortness of breath, wheezing  Cardiovascular ROS: No chest pain or palpitations  Gastrointestinal ROS: No abdominal pain or melena  Genito-Urinary ROS: No dysuria or hematuria  Neurological ROS: no headache or dizziness    Objective:  Patient Vitals for the past 24 hrs:   BP Temp Temp src Pulse Resp SpO2 Weight   08/20/21 0830 159/64 97.8 °F (36.6 °C) Oral 61 16 100 % --    08/20/21 0454 136/71 98.3 °F (36.8 °C) Oral 63 16 100 % 60.4 kg (133 lb 1.6 oz)   08/19/21 2321 144/70 98.4 °F (36.9 °C) Oral 63 16 100 % --   08/19/21 2127 126/64 -- -- -- -- -- --   08/19/21 1949 120/61 97.8 °F (36.6 °C) Oral 64 16 97 % --   08/19/21 1500 96/48 98.5 °F (36.9 °C) Oral 69 16 99 % --   08/19/21 1432 110/54 -- -- -- -- -- --   08/19/21 1220 135/97 98.8 °F (37.1 °C) Oral 66 16 99 % --       Intake/Output Summary (Last 24 hours) at 8/20/2021 1102  Last data filed at 8/20/2021 0900  Gross per 24 hour   Intake 480 ml   Output --   Net 480 ml     General: awake/alert    Neck: supple, no JVD  Chest:  clear to auscultation bilaterally without respiratory distress  CVS: regular rate and rhythm  Abdominal: soft, nontender, positive bowel sounds  Extremities: no cyanosis or edema  Skin: warm and dry without rash   Neuro: no focal motor deficits      Labs:  Results from last 7 days   Lab Units 08/20/21 0518 08/19/21  0430 08/18/21  0032   WBC 10*3/mm3 5.27 4.76 5.37   HEMOGLOBIN g/dL 8.9* 8.4* 8.0*   HEMATOCRIT % 28.2* 26.4* 23.5*   PLATELETS 10*3/mm3 185 156 128*         Results from last 7 days   Lab Units 08/20/21  0518 08/19/21  0430 08/18/21  0246 08/17/21  1743 08/17/21  1431   SODIUM mmol/L 136 133* 133*   < > 110*   POTASSIUM mmol/L 4.7 5.3* 4.3   < > 7.3*   CHLORIDE mmol/L 97* 94* 96*   < > 72*   CO2 mmol/L 29.0 26.0 27.0   < > 18.0*   BUN mg/dL 24* 29* 42*   < > 86*   CREATININE mg/dL 2.58* 3.18* 3.47*   < > 5.22*   CALCIUM mg/dL 9.1 8.6 9.2   < > 8.9   BILIRUBIN mg/dL  --  0.3 0.4  --  0.6   ALK PHOS U/L  --  102 102  --  116   ALT (SGPT) U/L  --  9 11  --  8   AST (SGOT) U/L  --  16 21  --  12   GLUCOSE mg/dL 47* 295* 67   < > 1,126*    < > = values in this interval not displayed.       Radiology:   Imaging Results (Last 72 Hours)     Procedure Component Value Units Date/Time    XR Chest 1 View [[id number]] Collected: 08/17/21 1608     Updated: 08/17/21 1615    Narrative:      EXAM: XR CHEST 1  VW-     HISTORY: weakness; R73.9-Hyperglycemia, unspecified; E87.5-Hyperkalemia;  E87.3-Eiwx-ktjqubasdd and hyponatremia; R53.1-Weakness       COMPARISON: June 19, 2021.     TECHNIQUE: Frontal radiograph of the chest     FINDINGS:   Bilateral interstitial infiltrates are present. This has appearance of  pulmonary vascular congestion.. Cardiac silhouette is mildly enlarged.  Left subclavian endovascular stent graft is present..      The osseous structures and surrounding soft tissues demonstrate no acute  abnormality.          Impression:      1. Cardiomegaly with bilateral interstitial infiltrates most likely  representing mild or early changes of pulmonary vascular congestion.        This report was finalized on 08/17/2021 16:11 by Dr. Lazaro Martinez MD.          Culture:  No results found for: BLOODCX, URINECX, WOUNDCX, MRSACX, RESPCX, STOOLCX      Assessment   1.  End stage renal disease on HD   2.  Essential hypertension  3.  Hyponatremia--improved  4.  Hyperkalemia--improved  5.  Anemia of CKD  6.  Acute pulmonary edema--improved  7.  Secondary hyperparathyroidism  8.  Hyperglycemia--improved    Plan:  1.  Dialysis next due 8/21  2.  Monitor labs      Delmar Salazar, APRN  8/20/2021  11:02 CDT

## 2021-08-20 NOTE — PLAN OF CARE
Problem: Adult Inpatient Plan of Care  Goal: Plan of Care Review  Flowsheets (Taken 8/20/2021 0956)  Progress: improving  Plan of Care Reviewed With: patient  Outcome Summary: Pt. performs ue exs to increase her act. xochilt during adl tasks!   Goal Outcome Evaluation:  Plan of Care Reviewed With: patient        Progress: improving  Outcome Summary: Pt. performs ue exs to increase her act. xochilt during adl tasks!

## 2021-08-20 NOTE — CASE MANAGEMENT/SOCIAL WORK
Continued Stay Note  [facility]     Patient Name: Ena Upton  MRN: [id number]  Today's Date: 8/20/2021    Admit Date: 8/17/2021    Discharge Plan     Row Name 08/20/21 1513       Plan    Post Acute Provider List  Dialysis    Provided Post Acute Provider Quality & Resource List?  Yes    Delivered To  Patient    Method of Delivery  Telephone    Final Discharge Disposition Code  01 - home or self-care    Final Note  Referral for rolling walker and wheelchair.  Insurance will not cover both and pt would rather have the wheelchair.  Pt chose [facility].  They will bring to pt's room shortly.    Row Name 08/20/21 1452       Plan    Plan  Home    Patient/Family in Agreement with Plan  yes    Final Discharge Disposition Code  01 - home or self-care    Final Note  Pt is being dc home today.  Pt does not want home health services due to her 3 pit bull dogs.    Row Name 08/20/21 1431       Plan    Plan  Home    Patient/Family in Agreement with Plan  yes    Plan Comments  There are no SNFs in [place] that can accept pt.  [facility] facilities in [place] have openings and SW can get pt's HD transferred to [place] however pt is refusing to go outside of [place].  She will not let SW refer anywhere else and states she is going home.   has informed Dr. Madrid.    Row Name 08/20/21 1258       Plan    Plan Comments  [facility] cannot offer a bed on pt.   will follow to hear from other SNFs.        Discharge Codes    No documentation.       Expected Discharge Date and Time     Expected Discharge Date Expected Discharge Time    Aug 20, 2021             MICHELLE Estrella

## 2021-08-20 NOTE — CASE MANAGEMENT/SOCIAL WORK
Continued Stay Note  [facility]     Patient Name: Ena Upton  MRN: [id number]  Today's Date: 8/20/2021    Admit Date: 8/17/2021    Discharge Plan     Row Name 08/20/21 1434       Plan    Plan  Home    Patient/Family in Agreement with Plan  yes    Plan Comments  There are no SNFs in [place] that can accept pt.  [facility] facilities in [place] have openings and SW can get pt's HD transferred to [place] however pt is refusing to go outside of [place].  She will not let SW refer anywhere else and states she is going home.  SW has informed Dr. Madrid.    Row Name 08/20/21 1256       Plan    Plan Comments  [facility] cannot offer a bed on pt.  SW will follow to hear from other SNFs.        Discharge Codes    No documentation.             Mel Silverio, DUSTINW

## 2021-08-20 NOTE — CASE MANAGEMENT/SOCIAL WORK
Continued Stay Note  [facility]     Patient Name: Ena Upton  MRN: [id number]  Today's Date: 8/20/2021    Admit Date: 8/17/2021    Discharge Plan     Row Name 08/20/21 1452       Plan    Plan  Home    Patient/Family in Agreement with Plan  yes    Final Discharge Disposition Code  01 - home or self-care    Final Note  Pt is being dc home today.  Pt does not want home health services due to her 3 pit bull dogs.    CYNDY has informed Sandy Oliva at [facility] of pt's dc and that she will resume HD schedule.    Row Name 08/20/21 7533       Plan    Plan  Home    Patient/Family in Agreement with Plan  yes    Plan Comments  There are no SNFs in [place] that can accept pt.  [facility] facilities in [place] have openings and SW can get pt's HD transferred to [place] however pt is refusing to go outside of [place].  She will not let SW refer anywhere else and states she is going home.  CYNDY has informed Dr. Madrid.    Row Name 08/20/21 1258       Plan    Plan Comments  [facility] cannot offer a bed on pt.  CYNDY will follow to hear from other SNFs.        Discharge Codes    No documentation.             MICHELLE Estrella

## 2021-08-20 NOTE — THERAPY TREATMENT NOTE
Acute Care - Physical Therapy Treatment Note  [facility]     Patient Name: Ena Upton  : 1970  MRN: [id number]  Today's Date: 2021      Visit Dx:     ICD-10-CM ICD-9-CM   1. Hyperglycemia  R73.9 790.29   2. Hyperkalemia  E87.5 276.7   3. Hyponatremia  E87.1 276.1   4. Generalized weakness  R53.1 780.79   5. Impaired mobility  Z74.09 799.89   6. 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)   • Non-ketotic hyperosmolar coma (CMS/HCC)   • HTN (hypertension)   • Intractable nausea and vomiting   • Constipation   • Gastroparesis due to DM (CMS/HCC)   • Hyperglycemia   • Seizure (CMS/HCC)   • End stage kidney disease (CMS/HCC)   • Sacral insufficiency fracture   • Hypertensive urgency   • Uncontrolled diabetes mellitus (CMS/HCC)   • Hyperkalemia     Past Medical History:   Diagnosis Date   • Anxiety    • Arthritis    • Chronic kidney disease     STAGE V RENAL FAILURE   • Depression    • Diabetes mellitus (CMS/HCC)    • HTN (hypertension)    • Hypothyroidism    • Obesity    • Tremors of nervous system      Past Surgical History:   Procedure Laterality Date   • ARTERIOVENOUS FISTULA     • BREAST BIOPSY     • CARPAL TUNNEL RELEASE     • CHOLECYSTECTOMY     • TUBAL ABDOMINAL LIGATION          PT Assessment (last 12 hours)      PT Evaluation and Treatment     Row Name 21 1155          Physical Therapy Time and Intention    Subjective Information  complains of;weakness;pain  -MF     Document Type  therapy note (daily note)  -     Mode of Treatment  physical therapy  -     Row Name 21 1155          General Information    Existing Precautions/Restrictions  fall  -     Row Name 21 1155          Pain Scale: Word Pre/Post-Treatment    Pain: Word Scale, Pretreatment  4 - moderate pain  -     Posttreatment Pain Rating  4 - moderate pain  -     Pain Location - Orientation   generalized  -MF     Pain Location  back  -MF     Pain Intervention(s)  Repositioned  -MF     Row Name 08/20/21 1155          Bed Mobility    Comment (Bed Mobility)  up in chair  -MF     Row Name 08/20/21 1155          Transfers    Sit-Stand [place] (Transfers)  contact guard  -MF     Stand-Sit [place] (Transfers)  contact guard  -MF     Row Name 08/20/21 1155          Gait/Stairs (Locomotion)    [place] Level (Gait)  contact guard  -     Assistive Device (Gait)  walker, front-wheeled  -MF     Distance in Feet (Gait)  50  -MF     Deviations/Abnormal Patterns (Gait)  stride length decreased;flavio decreased  -MF     Row Name 08/20/21 1155          Plan of Care Review    Plan of Care Reviewed With  patient  -MF     Progress  improving  -MF     Outcome Summary  Pt. agreeable to therapy. Pt was CGA for transfers and gait. She was able to walk 50' with RWx. She continues to c/o weakness, back pain, and numbness in leg. Will continue to work on strengthening.   -     Row Name 08/20/21 1155          Positioning and Restraints    Pre-Treatment Position  sitting in chair/recliner  -     Post Treatment Position  chair  -MF     In Chair  sitting;call light within reach;encouraged to call for assist  -MF       User Key  (r) = Recorded By, (t) = Taken By, (c) = Cosigned By    Initials Name Provider Type    Amada Kirby PTA Physical Therapy Assistant        Physical Therapy Education                 Title: PT OT SLP Therapies (In Progress)     Topic: Physical Therapy (In Progress)     Point: Mobility training (Done)     Learning Progress Summary           Patient Gerard, BULMARO ENGLISH DU by  at 8/18/2021 0562    Comment: Pt educated on safety with mobility. Pt educated to not perform mobility by herself due to decreased sensation in the LE.                   Point: Home exercise program (Not Started)     Learner Progress:  Not documented in this visit.          Point: Body mechanics (Not Started)      Learner Progress:  Not documented in this visit.          Point: Precautions (Not Started)     Learner Progress:  Not documented in this visit.                      User Key     Initials Effective Dates Name Provider Type Discipline     08/04/21 -  Elinor Lovell, PT Student PT Student PT              PT Recommendation and Plan     Plan of Care Reviewed With: patient  Progress: improving  Outcome Summary: Pt. agreeable to therapy. Pt was CGA for transfers and gait. She was able to walk 50' with RWx. She continues to c/o weakness, back pain, and numbness in leg. Will continue to work on strengthening.   Outcome Measures     Row Name 08/19/21 1430             How much help from another is currently needed...    Putting on and taking off regular lower body clothing?  2  -CJ      Bathing (including washing, rinsing, and drying)  2  -CJ      Toileting (which includes using toilet bed pan or urinal)  2  -CJ      Putting on and taking off regular upper body clothing  3  -CJ      Taking care of personal grooming (such as brushing teeth)  4  -CJ      Eating meals  4  -CJ      AM-PAC 6 Clicks Score (OT)  17  -         Functional Assessment    Outcome Measure Options  AM-PAC 6 Clicks Daily Activity (OT)  -        User Key  (r) = Recorded By, (t) = Taken By, (c) = Cosigned By    Initials Name Provider Type    CJ Zack Tafoya COTA Occupational Therapy Assistant           Time Calculation:   PT Charges     Row Name 08/20/21 1330             Time Calculation    Start Time  1155  -MF      Stop Time  1205  -MF      Time Calculation (min)  10 min  -MF      PT Received On  08/20/21  -MF         Time Calculation- PT    Total Timed Code Minutes- PT  10 minute(s)  -MF         Timed Charges    37126 - Gait Training Minutes   10  -MF         Total Minutes    Timed Charges Total Minutes  10  -MF       Total Minutes  10  -MF        User Key  (r) = Recorded By, (t) = Taken By, (c) = Cosigned By    Initials Name Provider Type      Amada Paige PTA Physical Therapy Assistant        Therapy Charges for Today     Code Description Service Date Service Provider Modifiers Qty    [id number] HC GAIT TRAINING EA 15 MIN 8/20/2021 Amada Paige PTA GP 1          PT G-Codes  Outcome Measure Options: AM-PAC 6 Clicks Daily Activity (OT)  AM-PAC 6 Clicks Score (PT): 17  AM-PAC 6 Clicks Score (OT): 17    Amada Paige PTA  8/20/2021

## 2021-08-20 NOTE — CASE MANAGEMENT/SOCIAL WORK
Continued Stay Note  [facility]     Patient Name: Ena Upton  MRN: [id number]  Today's Date: 8/20/2021    Admit Date: 8/17/2021    Discharge Plan     Row Name 08/20/21 1027       Plan    Plan  SNF    Provided Post Acute Provider List?  Yes    Post Acute Provider List  Nursing Home    Delivered To  Patient    Method of Delivery  In person    Patient/Family in Agreement with Plan  yes    Plan Comments  Pt agrees to referrals at [facility], [facility] and [facility].  SW will follow for bed offers/denials.        Discharge Codes    No documentation.             MICHELLE Estrella

## 2021-08-20 NOTE — DISCHARGE PLACEMENT REQUEST
"Chemo Upton (51 y.o. Female)     Date of Birth Social Security Number Address Home Phone MRN    1970  [id number] [facility] [phone] [id number]    Voodoo Marital Status          Other        Admission Date Admission Type Admitting Provider Attending Provider Department, Room/Bed    8/17/21 Emergency Narinder Madrid MD Truong, Khai C, MD [address], 445/[address]    Discharge Date Discharge Disposition Discharge Destination                       Attending Provider: Narinder Madrid MD    Allergies: Lamisil [Terbinafine], Penicillins, Reglan [Metoclopramide], Stadol [Butorphanol]    Isolation: None   Infection: None   Code Status: CPR    Ht: 170.2 cm (67.01\")   Wt: 60.4 kg (133 lb 1.6 oz)    Admission Cmt: None   Principal Problem: None                Active Insurance as of 8/17/2021     Primary Coverage     Payor Plan Insurance Group Employer/Plan Group    [facility] MEDICARE REPLACEMENT [employer] MEDICARE REPLACEMENT      Payor Plan Address Payor Plan Phone Number Payor Plan Fax Number Effective Dates    [address] [address] [phone]  6/1/2021 - None Entered    [facility] [id number]       Subscriber Name Subscriber Birth Date Member ID       CHEMO UPTON 1970 [id number]           Secondary Coverage     Payor Plan Insurance Group Employer/Plan Group    [place]      Payor Plan Address Payor Plan Phone Number Payor Plan Fax Number Effective Dates    [address] [phone]  8/17/2021 - None Entered    [facility] [id number]       Subscriber Name Subscriber Birth Date Member ID       CHEMO UPTON 1970 [id number]                 Emergency Contacts      (Rel.) Home Phone Work Phone Mobile Phone    Dmitriy Galvez (Friend) -- -- [phone]    Kassandra Paez (Sister) [phone] -- [phone]               History & Physical      Narinder Madrid MD at 08/17/21 1617              [facility] " Medicine Services  HISTORY AND PHYSICAL    Date of Admission: 8/17/2021  Primary Care Physician: Norm Wiggins APRN    Subjective     Chief Complaint: Hyperkalemia.  Renal failure on dialysis.  Hyperglycemia.    History of Present Illness  Patient is a 51-year-old  female came to the ER for evaluation of hyperglycemia and weakness.  Patient has missed his dialysis appointment.  ER physician discussed plan for dialysis today.  Patient is a chronic diabetes, glucose was in the thousand in ER.  Patient will have dialysis Tuesday Thursday and Saturday.  Patient missed Tuesday only.  Patient states she feels weak all day long and check her sugar is too high to read.  Patient was so weak to get around family proceeded to call 911.  Patient was brought to ER to be evaluated.  Patient is on chronic dialysis.    Review of Systems   Constitutional: Positive for activity change, appetite change and fatigue. Negative for chills and fever.   HENT: Negative for hearing loss, nosebleeds, tinnitus and trouble swallowing.    Eyes: Negative for visual disturbance.   Respiratory: Positive for shortness of breath and wheezing. Negative for cough and chest tightness.    Cardiovascular: Positive for leg swelling. Negative for chest pain and palpitations.   Gastrointestinal: Negative for abdominal distention, abdominal pain, blood in stool, constipation, diarrhea, nausea and vomiting.   Endocrine: Negative for cold intolerance, heat intolerance, polydipsia, polyphagia and polyuria.   Genitourinary: Negative for decreased urine volume, difficulty urinating, dysuria, flank pain, frequency and hematuria.   Musculoskeletal: Negative for arthralgias, joint swelling and myalgias.   Skin: Negative for rash.   Allergic/Immunologic: Negative for immunocompromised state.   Neurological: Positive for weakness. Negative for dizziness, syncope, light-headedness and headaches.   Hematological: Negative for adenopathy. Does not bruise/bleed  easily.   Psychiatric/Behavioral: Negative for confusion and sleep disturbance. The patient is not nervous/anxious.         Otherwise complete ROS reviewed and negative except as mentioned in the HPI.      Past Medical History:   Past Medical History:   Diagnosis Date   • Anxiety    • Arthritis    • Chronic kidney disease     STAGE V RENAL FAILURE   • Depression    • Diabetes mellitus (CMS/HCC)    • HTN (hypertension)    • Hypothyroidism    • Obesity    • Tremors of nervous system        Past Surgical History:  Past Surgical History:   Procedure Laterality Date   • ARTERIOVENOUS FISTULA     • BREAST BIOPSY     • CARPAL TUNNEL RELEASE     • CHOLECYSTECTOMY     • TUBAL ABDOMINAL LIGATION         Family History: family history includes Cancer in an other family member; Diabetes in her maternal aunt, maternal grandmother, mother, and another family member; Heart disease in an other family member; Hypertension in an other family member; Kidney disease in an other family member.    Social History:  reports that she has been smoking. She has been smoking about 0.50 packs per day. She has never used smokeless tobacco. She reports that she does not drink alcohol and does not use drugs.    Medications:  Prior to Admission medications    Medication Sig Start Date End Date Taking? Authorizing Provider   albuterol (PROVENTIL HFA;VENTOLIN HFA) 108 (90 BASE) MCG/ACT inhaler Inhale 2 puffs Every 4 (Four) Hours As Needed for wheezing.    ProviderBlanche MD   carvedilol (COREG) 12.5 MG tablet Take 1 tablet by mouth 2 (Two) Times a Day With Meals. 6/29/21   Adán Merrill, DO   cloNIDine (CATAPRES) 0.2 MG tablet Take 1 tablet by mouth Every 8 (Eight) Hours. 6/29/21   Adán Merrill DO   DULoxetine (CYMBALTA) 60 MG capsule Take 60 mg by mouth daily.    ProviderBlanche MD   gabapentin (NEURONTIN) 300 MG capsule Take 1 capsule by mouth Every Night for 3 days. 6/29/21 7/2/21  Adán Merrill DO   hydrALAZINE  "(APRESOLINE) 100 MG tablet Take 1 tablet by mouth 3 (Three) Times a Day. 6/29/21   Adán Merrill DO   insulin detemir (LEVEMIR) 100 UNIT/ML injection Inject 15 Units under the skin into the appropriate area as directed Daily. 6/30/21   Adán Merrill DO   lactulose (CHRONULAC) 10 GM/15ML solution Take 20 g by mouth 3 (Three) Times a Day.    Blanche Neil MD   levothyroxine (SYNTHROID, LEVOTHROID) 137 MCG tablet Take 1 tablet by mouth Daily. 6/30/21   Adán Merrill DO   lisinopril (PRINIVIL,ZESTRIL) 20 MG tablet Take 20 mg by mouth 2 (two) times a day.    Blanche Neil MD   NIFEdipine XL (PROCARDIA XL) 90 MG 24 hr tablet Take 1 tablet by mouth Daily. 6/30/21   Adán Merrill DO   ondansetron (ZOFRAN) 4 MG tablet Take 4 mg by mouth Every 8 (Eight) Hours As Needed for nausea or vomiting.    Blanche Neil MD   orphenadrine (NORFLEX) 100 MG 12 hr tablet Take 1 tablet by mouth Every 12 (Twelve) Hours for 3 days. 6/29/21 7/2/21  Adán Merrill DO   rOPINIRole (REQUIP) 4 MG tablet Take 4 mg by mouth Every Night.    Blanche Neil MD   senna (SENOKOT) 8.6 MG tablet tablet Take 1 tablet by mouth Every Night.    Blanche Neil MD   simvastatin (ZOCOR) 20 MG tablet Take 20 mg by mouth Daily.    Blanche Neil MD     Allergies:  Allergies   Allergen Reactions   • Lamisil [Terbinafine]    • Penicillins    • Reglan [Metoclopramide] GI Intolerance   • Stadol [Butorphanol] Nausea And Vomiting       Objective     Vital Signs: /90   Pulse 63   Temp 97.4 °F (36.3 °C) (Oral)   Resp 18   Ht 167.6 cm (66\")   Wt 63.5 kg (140 lb)   LMP  (LMP Unknown)   SpO2 100%   BMI 22.60 kg/m²   Physical Exam  Vitals and nursing note reviewed.   Constitutional:       Appearance: She is well-developed.   HENT:      Head: Normocephalic.   Eyes:      Conjunctiva/sclera: Conjunctivae normal.      Pupils: Pupils are equal, round, and reactive to light.   Neck:      Vascular: No JVD. "   Cardiovascular:      Rate and Rhythm: Normal rate and regular rhythm.      Heart sounds: Normal heart sounds. No murmur heard.   No friction rub. No gallop.    Pulmonary:      Effort: No respiratory distress.      Breath sounds: No wheezing or rales.      Comments: Diminished breath sound bilateral, clear  Chest:      Chest wall: No tenderness.   Abdominal:      General: Bowel sounds are normal. There is no distension.      Palpations: Abdomen is soft.      Tenderness: There is no abdominal tenderness. There is no guarding or rebound.   Musculoskeletal:         General: No tenderness or deformity. Normal range of motion.      Cervical back: Neck supple.   Skin:     General: Skin is warm and dry.      Capillary Refill: Capillary refill takes 2 to 3 seconds.      Findings: No rash.   Neurological:      Mental Status: She is alert and oriented to person, place, and time.      Cranial Nerves: No cranial nerve deficit.      Motor: Weakness present. No abnormal muscle tone.      Deep Tendon Reflexes: Reflexes normal.   Psychiatric:         Behavior: Behavior normal.         Thought Content: Thought content normal.         Judgment: Judgment normal.              Results Reviewed:    Lab Results (last 24 hours)     Procedure Component Value Units Date/Time    COVID-19,[facility] IN-HOUSE,Nasal Swab No Transport Media 3-4 HR TAT - Swab, Nasal Cavity [[id number]]  (Normal) Collected: 08/17/21 1548    Specimen: Swab from Nasal Cavity Updated: 08/17/21 1642     COVID19 Not Detected    Narrative:      Fact sheet for providers: https://www.fda.gov/media/615637/download     Fact sheet for patients: https://www.fda.gov/media/237164/download    Test performed by PCR.    Consider negative results in combination with clinical observations, patient history, and epidemiological information.    Blood Gas, Arterial - [[id number]]  (Abnormal) Collected: 08/17/21 1550    Specimen: Arterial Blood Updated: 08/17/21 1559     Site Right Radial      Aaron's Test N/A     pH, Arterial 7.370 pH units      pCO2, Arterial 36.8 mm Hg      pO2, Arterial 61.6 mm Hg      Comment: 84 Value below reference range        HCO3, Arterial 21.2 mmol/L      Base Excess, Arterial -3.7 mmol/L      Comment: 84 Value below reference range        O2 Saturation, Arterial 89.8 %      Comment: 84 Value below reference range        Temperature 37.0 C      Barometric Pressure for Blood Gas 748 mmHg      Modality Room Air     Ventilator Mode NA     Collected by [id number]     Comment: Meter: [id number]     :  [id number]        pCO2, Temperature Corrected 36.8 mm Hg      pH, Temp Corrected 7.370 pH Units      pO2, Temperature Corrected 61.6 mm Hg     Osmolality, Serum [[id number]]  (Abnormal) Collected: 08/17/21 1432    Specimen: Blood Updated: 08/17/21 1555     Osmolality 331 mOsm/kg     Phosphorus [[id number]]  (Abnormal) Collected: 08/17/21 1431    Specimen: Blood Updated: 08/17/21 1554     Phosphorus 5.4 mg/dL     [facility] Draw [[id number]] Collected: 08/17/21 1431    Specimen: Blood Updated: 08/17/21 1545    Narrative:      The following orders were created for panel order [place] Draw.  Procedure                               Abnormality         Status                     ---------                               -----------         ------                     Green Top (Gel)[[id number]]                                  Final result               Lavender Top[[id number]]                                     Final result               Red Top[[id number]]                                          Final result                 Please view results for these tests on the individual orders.    Green Top (Gel) [[id number]] Collected: 08/17/21 1431    Specimen: Blood Updated: 08/17/21 1545     Extra Tube Hold for add-ons.     Comment: Auto resulted.       Red Top [[id number]] Collected: 08/17/21 1432    Specimen: Blood Updated: 08/17/21 1545     Extra Tube Hold for add-ons.     Comment: Auto  resulted.       Scarlett Top [[id number]] Collected: 08/17/21 1431    Specimen: Blood Updated: 08/17/21 1545     Extra Tube hold for add-on     Comment: Auto resulted       Comprehensive Metabolic Panel [[id number]]  (Abnormal) Collected: 08/17/21 1431    Specimen: Blood Updated: 08/17/21 1522     Glucose 1,126 mg/dL      BUN 86 mg/dL      Creatinine 5.22 mg/dL      Sodium 110 mmol/L      Potassium 7.3 mmol/L      Chloride 72 mmol/L      CO2 18.0 mmol/L      Calcium 8.9 mg/dL      Total Protein 7.2 g/dL      Albumin 4.10 g/dL      ALT (SGPT) 8 U/L      AST (SGOT) 12 U/L      Alkaline Phosphatase 116 U/L      Total Bilirubin 0.6 mg/dL      eGFR Non African Amer 9 mL/min/1.73      Comment: <15 Indicative of kidney failure.        eGFR   Amer --     Comment: <15 Indicative of kidney failure.        Globulin 3.1 gm/dL      A/G Ratio 1.3 g/dL      BUN/Creatinine Ratio 16.5     Anion Gap 20.0 mmol/L     Narrative:      GFR Normal >60  Chronic Kidney Disease <60  Kidney Failure <15      Troponin [[id number]]  (Abnormal) Collected: 08/17/21 1431    Specimen: Blood Updated: 08/17/21 1509     Troponin T 0.186 ng/mL     Narrative:      Troponin T Reference Range:  <= 0.03 ng/mL-   Negative for AMI  >0.03 ng/mL-     Abnormal for myocardial necrosis.  Clinicians would have to utilize clinical acumen, EKG, Troponin and serial changes to determine if it is an Acute Myocardial Infarction or myocardial injury due to an underlying chronic condition.       Results may be falsely decreased if patient taking Biotin.      Magnesium [[id number]]  (Normal) Collected: 08/17/21 1431    Specimen: Blood Updated: 08/17/21 1502     Magnesium 2.6 mg/dL     CBC & Differential [[id number]]  (Abnormal) Collected: 08/17/21 1431    Specimen: Blood Updated: 08/17/21 1455    Narrative:      The following orders were created for panel order CBC & Differential.  Procedure                               Abnormality         Status                      ---------                               -----------         ------                     CBC Auto Differential[[id number]]        Abnormal            Final result                 Please view results for these tests on the individual orders.    CBC Auto Differential [[id number]]  (Abnormal) Collected: 08/17/21 1431    Specimen: Blood Updated: 08/17/21 1455     WBC 6.75 10*3/mm3      RBC 2.99 10*6/mm3      Hemoglobin 8.8 g/dL      Hematocrit 25.8 %      MCV 86.3 fL      MCH 29.4 pg      MCHC 34.1 g/dL      RDW 14.3 %      RDW-SD 45.0 fl      MPV 10.8 fL      Platelets 137 10*3/mm3      Neutrophil % 79.0 %      Lymphocyte % 11.0 %      Monocyte % 8.0 %      Eosinophil % 0.4 %      Basophil % 1.3 %      Immature Grans % 0.3 %      Neutrophils, Absolute 5.33 10*3/mm3      Lymphocytes, Absolute 0.74 10*3/mm3      Monocytes, Absolute 0.54 10*3/mm3      Eosinophils, Absolute 0.03 10*3/mm3      Basophils, Absolute 0.09 10*3/mm3      Immature Grans, Absolute 0.02 10*3/mm3      nRBC 0.0 /100 WBC            Radiology Data:    Imaging Results (Last 24 Hours)     Procedure Component Value Units Date/Time    XR Chest 1 View [[id number]] Collected: 08/17/21 1608     Updated: 08/17/21 1615    Narrative:      EXAM: XR CHEST 1 VW-     HISTORY: weakness; R73.9-Hyperglycemia, unspecified; E87.5-Hyperkalemia;  E87.5-Vafv-kguaapjtsg and hyponatremia; R53.1-Weakness       COMPARISON: June 19, 2021.     TECHNIQUE: Frontal radiograph of the chest     FINDINGS:   Bilateral interstitial infiltrates are present. This has appearance of  pulmonary vascular congestion.. Cardiac silhouette is mildly enlarged.  Left subclavian endovascular stent graft is present..      The osseous structures and surrounding soft tissues demonstrate no acute  abnormality.          Impression:      1. Cardiomegaly with bilateral interstitial infiltrates most likely  representing mild or early changes of pulmonary vascular congestion.        This report was finalized on  08/17/2021 16:11 by Dr. Lazaro Martinez MD.          I have personally reviewed and interpreted the radiology studies and ECG obtained at time of admission.     Assessment / Plan      Assessment & Plan  Active Hospital Problems    Diagnosis    • Hyperkalemia    • Uncontrolled diabetes mellitus (CMS/HCC)    • End stage kidney disease (CMS/HCC)    • Hyperglycemia    • Gastroparesis due to DM (CMS/HCC)    • Intractable nausea and vomiting      Plan.    Hyperglycemia.  Plan for dialysis now stat.  Patient start insulin drip in ER.    Chronic renal failure on dialysis.  Plan for dialysis today.    Hyperkalemia.  After dialysis now stat.    Nausea/vomiting.  Pepcid.  Zofran.    Hypertension/hyperlipidemia.  Coreg.  Catapres.  Hydralazine.  Hold lisinopril due to hyperkalemia.  Procardia.  Zocor.    Anxiety/depression.  Cymbalta.    Neuropathy.  Neurontin.  Requip at night.    Chronic smoker.  Nicotine patch.  Discussed patient cutting back and stopping.    Constipation . Lactulose.  Senokot.    Hypothyroidism.  Synthroid.    Code Status: full code     I discussed the patient's findings and my recommendations with: patient    Estimated length of stay: 2-4 days.     Electronically signed by Narinder Madrid MD, 08/17/21, 4:17 PM CDT.              Electronically signed by Narinder Madrid MD at 08/17/21 1654       Operative/Procedure Notes (most recent note)    No notes of this type exist for this encounter.            Physician Progress Notes (most recent note)      Narinder Madrid MD at 08/19/21 1003              [facility] Medicine Services  INPATIENT PROGRESS NOTE    Length of Stay: 2  Date of Admission: 8/17/2021  Primary Care Physician: Norm Wiggins, APRN    Subjective   Chief Complaint: Weakness/hyperkalemia/dialysis    HPI   Hyperkalemia, plan for dialysis today.  Blood sugars still fluctuate a lot.  Patient denies any chest pain.  Patient no acute stress.  Patient not requiring  oxygen.  Patient still remain weak    Review of Systems   Constitutional: Positive for activity change, appetite change and fatigue. Negative for chills and fever.   HENT: Negative for hearing loss, nosebleeds, tinnitus and trouble swallowing.    Eyes: Negative for visual disturbance.   Respiratory: Negative for cough, chest tightness, shortness of breath and wheezing.    Cardiovascular: Negative for chest pain, palpitations and leg swelling.   Gastrointestinal: Negative for abdominal distention, abdominal pain, blood in stool, constipation, diarrhea, nausea and vomiting.   Endocrine: Negative for cold intolerance, heat intolerance, polydipsia, polyphagia and polyuria.   Genitourinary: Negative for decreased urine volume, difficulty urinating, dysuria, flank pain, frequency and hematuria.   Musculoskeletal: Positive for arthralgias, gait problem and myalgias. Negative for joint swelling.   Skin: Negative for rash.   Allergic/Immunologic: Negative for immunocompromised state.   Neurological: Positive for weakness. Negative for dizziness, syncope, light-headedness and headaches.   Hematological: Negative for adenopathy. Does not bruise/bleed easily.   Psychiatric/Behavioral: Negative for confusion and sleep disturbance. The patient is not nervous/anxious.      All pertinent negatives and positives are as above. All other systems have been reviewed and are negative unless otherwise stated.     Objective    Temp:  [98 °F (36.7 °C)-98.5 °F (36.9 °C)] 98.5 °F (36.9 °C)  Heart Rate:  [61-69] 66  Resp:  [16-18] 16  BP: ()/(57-90) 189/85    Intake/Output Summary (Last 24 hours) at 8/19/2021 1003  Last data filed at 8/18/2021 1718  Gross per 24 hour   Intake 480 ml   Output 3000 ml   Net -2520 ml     Physical Exam  Vitals and nursing note reviewed.   Constitutional:       Appearance: She is well-developed.   HENT:      Head: Normocephalic.   Eyes:      Conjunctiva/sclera: Conjunctivae normal.      Pupils: Pupils are  equal, round, and reactive to light.   Neck:      Vascular: No JVD.   Cardiovascular:      Rate and Rhythm: Normal rate and regular rhythm.      Heart sounds: Normal heart sounds. No murmur heard.   No friction rub. No gallop.    Pulmonary:      Effort: No respiratory distress.      Breath sounds: No wheezing or rales.      Comments: Diminished breath sound, clear .  Chest:      Chest wall: No tenderness.   Abdominal:      General: Bowel sounds are normal. There is no distension.      Palpations: Abdomen is soft.      Tenderness: There is no abdominal tenderness. There is no guarding or rebound.   Musculoskeletal:         General: No tenderness or deformity. Normal range of motion.      Cervical back: Neck supple.   Skin:     General: Skin is warm and dry.      Capillary Refill: Capillary refill takes 2 to 3 seconds.      Findings: No rash.   Neurological:      General: No focal deficit present.      Mental Status: She is alert and oriented to person, place, and time.      Cranial Nerves: No cranial nerve deficit.      Motor: Weakness present. No abnormal muscle tone.      Coordination: Coordination abnormal.      Gait: Gait abnormal.      Deep Tendon Reflexes: Reflexes normal.   Psychiatric:         Behavior: Behavior normal.         Thought Content: Thought content normal.      Results Review:  Lab Results (last 24 hours)     Procedure Component Value Units Date/Time    POC Glucose Once [[id number]]  (Abnormal) Collected: 08/19/21 0751    Specimen: Blood Updated: 08/19/21 0827     Glucose 339 mg/dL      Comment: : [id number] CárdenasMeter ID: [id number]       Comprehensive Metabolic Panel [[id number]]  (Abnormal) Collected: 08/19/21 0430    Specimen: Blood Updated: 08/19/21 0523     Glucose 295 mg/dL      BUN 29 mg/dL      Creatinine 3.18 mg/dL      Sodium 133 mmol/L      Potassium 5.3 mmol/L      Chloride 94 mmol/L      CO2 26.0 mmol/L      Calcium 8.6 mg/dL      Total Protein 7.0 g/dL      Albumin 3.80 g/dL       ALT (SGPT) 9 U/L      AST (SGOT) 16 U/L      Alkaline Phosphatase 102 U/L      Total Bilirubin 0.3 mg/dL      eGFR Non African Amer 15 mL/min/1.73      Globulin 3.2 gm/dL      A/G Ratio 1.2 g/dL      BUN/Creatinine Ratio 9.1     Anion Gap 13.0 mmol/L     Narrative:      GFR Normal >60  Chronic Kidney Disease <60  Kidney Failure <15      CBC & Differential [[id number]]  (Abnormal) Collected: 08/19/21 0430    Specimen: Blood Updated: 08/19/21 0505    Narrative:      The following orders were created for panel order CBC & Differential.  Procedure                               Abnormality         Status                     ---------                               -----------         ------                     CBC Auto Differential[[id number]]        Abnormal            Final result                 Please view results for these tests on the individual orders.    CBC Auto Differential [[id number]]  (Abnormal) Collected: 08/19/21 0430    Specimen: Blood Updated: 08/19/21 0505     WBC 4.76 10*3/mm3      RBC 2.93 10*6/mm3      Hemoglobin 8.4 g/dL      Hematocrit 26.4 %      MCV 90.1 fL      MCH 28.7 pg      MCHC 31.8 g/dL      RDW 14.4 %      RDW-SD 46.5 fl      MPV 10.3 fL      Platelets 156 10*3/mm3      Neutrophil % 54.2 %      Lymphocyte % 23.9 %      Monocyte % 14.3 %      Eosinophil % 6.1 %      Basophil % 1.3 %      Immature Grans % 0.2 %      Neutrophils, Absolute 2.58 10*3/mm3      Lymphocytes, Absolute 1.14 10*3/mm3      Monocytes, Absolute 0.68 10*3/mm3      Eosinophils, Absolute 0.29 10*3/mm3      Basophils, Absolute 0.06 10*3/mm3      Immature Grans, Absolute 0.01 10*3/mm3      nRBC 0.0 /100 WBC     POC Glucose Once [[id number]]  (Normal) Collected: 08/18/21 2210    Specimen: Blood Updated: 08/18/21 2223     Glucose 119 mg/dL      Comment: : [id number] Humble JackorquideablankaMeter ID: [id number]       POC Glucose Once [[id number]]  (Abnormal) Collected: 08/18/21 2026    Specimen: Blood Updated: 08/18/21 2050      Glucose 199 mg/dL      Comment: : [id number] Humble PhippsMeter ID: [id number]       POC Glucose Once [[id number]]  (Abnormal) Collected: 08/18/21 1512    Specimen: Blood Updated: 08/18/21 1523     Glucose 164 mg/dL      Comment: : [id number] Rosi SantosMeter ID: [id number]              Cultures:  No results found for: BLOODCX, URINECX, WOUNDCX, MRSACX, RESPCX, STOOLCX    Radiology Data:    Imaging Results (Last 24 Hours)     ** No results found for the last 24 hours. **          Allergies   Allergen Reactions   • Lamisil [Terbinafine]    • Penicillins    • Reglan [Metoclopramide] GI Intolerance   • Stadol [Butorphanol] Nausea And Vomiting       Scheduled meds:   atorvastatin, 10 mg, Oral, Daily  carvedilol, 12.5 mg, Oral, BID With Meals  cloNIDine, 0.2 mg, Oral, Q8H  DULoxetine, 30 mg, Oral, Daily  famotidine, 20 mg, Oral, Daily  gabapentin, 300 mg, Oral, Nightly  hydrALAZINE, 50 mg, Oral, TID  insulin detemir, 5 Units, Subcutaneous, Daily  insulin lispro, 2-7 Units, Subcutaneous, 4x Daily With Meals & Nightly  lisinopril, 20 mg, Oral, Q12H  nicotine, 1 patch, Transdermal, Q24H  NIFEdipine XL, 90 mg, Oral, Q24H  rOPINIRole, 4 mg, Oral, Nightly  senna, 1 tablet, Oral, Nightly  sodium chloride, 10 mL, Intravenous, Q12H  sodium polystyrene, 15 g, Oral, Once        PRN meds:  •  albuterol  •  dextrose  •  dextrose  •  glucagon (human recombinant)  •  ondansetron  •  [COMPLETED] Insert peripheral IV **AND** sodium chloride  •  sodium chloride    Assessment/Plan       Intractable nausea and vomiting    Gastroparesis due to DM (CMS/HCC)    Hyperglycemia    End stage kidney disease (CMS/HCC)    Uncontrolled diabetes mellitus (CMS/HCC)    Hyperkalemia      Plan:  DM/Hyperglycemia.  Sugar stable.  Hemoglobin A1c 10.6.  Sliding scale.  Levemir.     Chronic renal failure on dialysis.    Nephrology consult.  Dialysis per nephrology.     Hyperkalemia.     Plan for dialysis.     Nausea/vomiting.  Pepcid.   Zofran.     Hypertension/hyperlipidemia.  Coreg.  Catapres.  Hydralazine.  Hold lisinopril due to hyperkalemia.  Procardia.  Zocor.     COPD.  Albuterol nebulizer as needed.  Chest x-ray-Cardiomegaly with bilateral interstitial infiltrates most likely representing mild or early changes of pulmonary vascular congestion.     Anemia.    Hemoglobin stable. No sign of acute bleed.  Erythropoietin.  Anemia panel.     TSH elevated.  Free T4-normal     Anxiety/depression.  Cymbalta.     Neuropathy.  Neurontin.  Requip at night.     Chronic smoker.  Nicotine patch.  Discussed patient cutting back and stopping.     Constipation . Lactulose.  Senokot.     Hypothyroidism.  Synthroid.     Deconditioning.  PT and OT consult.     Covid-19-negative.     Discharge Plannin-4 days.  Patient refused rehab.  Patient go home with home health and a wheelchair.  Patient already have a walker at home.    Electronically signed by Narinder Madrid MD, 21, 10:03 AM CDT.                    Electronically signed by Narinder Madrid MD at 21 1010          Consult Notes (most recent note)      Surjit Avitia MD at 21 1628          Nephrology ([facility]) Consult Note      Patient:  Ena Upton  YOB: 1970  Date of Service: 2021  MRN: [id number]   Acct: [id number]   Primary Care Physician: Norm Wiggins APRN  Advance Directive:   There are no questions and answers to display.     Admit Date: 2021       Hospital Day: 0  Referring Provider: Marcello Lagos MD      Patient personally seen and examined.  Complete chart including Consults, Notes, Operative Reports, Labs, Cardiology, and Radiology studies reviewed as able.        Subjective:  Ena Upton is a 51 y.o. female  whom we were consulted for end-stage renal disease.  Patient routinely receives her dialysis .  Last treatment on Saturday without issue.  She had a doctor's  appointment scheduled for today so elected to not proceed with her routine dialysis on schedule.  She later developed weakness and presented to emergency room for evaluation.  She was found to have blood glucose greater than 1000 along with hyponatremia, hyperkalemia.  Patient admitted to the hospitalist service and we were consulted for the metabolic derangements.  Patient admitted to shortness of air and weakness.  Denied chest pain, nausea or vomiting.    Dialysis   Pt was seen on RRT at 1740  Modality: Hemodialysis  Access: Arterial Venous Fistula  Location: left upper  QB: 350  QD: 500  UF: 1300        Allergies:  Lamisil [terbinafine], Penicillins, Reglan [metoclopramide], and Stadol [butorphanol]    Home Meds:  (Not in a hospital admission)      Medicines:  Current Facility-Administered Medications   Medication Dose Route Frequency Provider Last Rate Last Admin   • dextrose (D50W) 25 g/ 50mL Intravenous Solution 25-50 mL  25-50 mL Intravenous Q30 Min PRN Marcello Lagos MD       • insulin regular (HumuLIN R,NovoLIN R) 100 Units in sodium chloride 0.9 % 100 mL (1 Units/mL) infusion  1-20 Units/hr Intravenous Titrated Marcello Lagos MD 4 mL/hr at 08/17/21 1546 4 Units/hr at 08/17/21 1546   • insulin regular (humuLIN R,novoLIN R) injection 7 Units  7 Units Intravenous TID AC Marcello Lagos MD       • sodium chloride 0.9 % flush 10 mL  10 mL Intravenous PRN Marcello Lagos MD         Current Outpatient Medications   Medication Sig Dispense Refill   • albuterol (PROVENTIL HFA;VENTOLIN HFA) 108 (90 BASE) MCG/ACT inhaler Inhale 2 puffs Every 4 (Four) Hours As Needed for wheezing.     • carvedilol (COREG) 12.5 MG tablet Take 1 tablet by mouth 2 (Two) Times a Day With Meals.     • cloNIDine (CATAPRES) 0.2 MG tablet Take 1 tablet by mouth Every 8 (Eight) Hours.     • DULoxetine (CYMBALTA) 60 MG capsule Take 60 mg by mouth daily.     • gabapentin (NEURONTIN) 300 MG capsule Take 1 capsule by  mouth Every Night for 3 days. 3 capsule 0   • hydrALAZINE (APRESOLINE) 100 MG tablet Take 1 tablet by mouth 3 (Three) Times a Day.     • insulin detemir (LEVEMIR) 100 UNIT/ML injection Inject 15 Units under the skin into the appropriate area as directed Daily.  12   • lactulose (CHRONULAC) 10 GM/15ML solution Take 20 g by mouth 3 (Three) Times a Day.     • levothyroxine (SYNTHROID, LEVOTHROID) 137 MCG tablet Take 1 tablet by mouth Daily.     • lisinopril (PRINIVIL,ZESTRIL) 20 MG tablet Take 20 mg by mouth 2 (two) times a day.     • NIFEdipine XL (PROCARDIA XL) 90 MG 24 hr tablet Take 1 tablet by mouth Daily.     • ondansetron (ZOFRAN) 4 MG tablet Take 4 mg by mouth Every 8 (Eight) Hours As Needed for nausea or vomiting.     • orphenadrine (NORFLEX) 100 MG 12 hr tablet Take 1 tablet by mouth Every 12 (Twelve) Hours for 3 days. 6 tablet 0   • rOPINIRole (REQUIP) 4 MG tablet Take 4 mg by mouth Every Night.     • senna (SENOKOT) 8.6 MG tablet tablet Take 1 tablet by mouth Every Night.     • simvastatin (ZOCOR) 20 MG tablet Take 20 mg by mouth Daily.         Past Medical History:  Past Medical History:   Diagnosis Date   • Anxiety    • Arthritis    • Chronic kidney disease     STAGE V RENAL FAILURE   • Depression    • Diabetes mellitus (CMS/HCC)    • HTN (hypertension)    • Hypothyroidism    • Obesity    • Tremors of nervous system        Past Surgical History:  Past Surgical History:   Procedure Laterality Date   • ARTERIOVENOUS FISTULA     • BREAST BIOPSY     • CARPAL TUNNEL RELEASE     • CHOLECYSTECTOMY     • TUBAL ABDOMINAL LIGATION         Family History  Family History   Problem Relation Age of Onset   • Cancer Other    • Hypertension Other    • Kidney disease Other    • Heart disease Other    • Diabetes Other    • Diabetes Mother    • Diabetes Maternal Aunt    • Diabetes Maternal Grandmother        Social History  Social History     Socioeconomic History   • Marital status:      Spouse name: Not on file  "  • Number of children: Not on file   • Years of education: Not on file   • Highest education level: Not on file   Tobacco Use   • Smoking status: Current Every Day Smoker     Packs/day: 0.50   • Smokeless tobacco: Never Used   • Tobacco comment: CUTTING BACK AND TRYING TO STOP SMOKING   Substance and Sexual Activity   • Alcohol use: No   • Drug use: No   • Sexual activity: Defer         Review of Systems:  History obtained from chart review and the patient  General ROS: No fever or chills  Respiratory ROS: +cough, shortness of breath  Cardiovascular ROS: No chest pain or palpitations  Gastrointestinal ROS: No abdominal pain or melena  Genito-Urinary ROS: No dysuria or hematuria  14 point ROS reviewed with the patient and negative except as noted above and in the HPI unless unable to obtain.    Objective:  Patient Vitals for the past 24 hrs:   BP Temp Temp src Pulse Resp SpO2 Height Weight   08/17/21 1616 155/90 -- -- 63 -- 100 % -- --   08/17/21 1601 127/58 -- -- 60 -- 100 % -- --   08/17/21 1553 -- -- -- 61 18 100 % -- --   08/17/21 1434 112/70 97.4 °F (36.3 °C) Oral -- -- -- -- --   08/17/21 1429 -- -- -- -- 18 -- -- --   08/17/21 1428 -- -- -- 58 -- 98 % 167.6 cm (66\") 63.5 kg (140 lb)       Intake/Output Summary (Last 24 hours) at 8/17/2021 1629  Last data filed at 8/17/2021 1623  Gross per 24 hour   Intake 1100 ml   Output --   Net 1100 ml     General: awake/alert   Chest:  Bilateral crackles  CVS: regular rate and rhythm  Abdominal: soft, nontender, positive bowel sounds  Extremities: ble edema  Skin: warm and dry without rash      Labs:  Results from last 7 days   Lab Units 08/17/21  1431   WBC 10*3/mm3 6.75   HEMOGLOBIN g/dL 8.8*   HEMATOCRIT % 25.8*   PLATELETS 10*3/mm3 137*         Results from last 7 days   Lab Units 08/17/21  1431   SODIUM mmol/L 110*   POTASSIUM mmol/L 7.3*   CHLORIDE mmol/L 72*   CO2 mmol/L 18.0*   BUN mg/dL 86*   CREATININE mg/dL 5.22*   CALCIUM mg/dL 8.9   BILIRUBIN mg/dL 0.6   ALK " PHOS U/L 116   ALT (SGPT) U/L 8   AST (SGOT) U/L 12   GLUCOSE mg/dL 1,126*       Radiology:   Imaging Results (Last 72 Hours)     Procedure Component Value Units Date/Time    XR Chest 1 View [[id number]] Collected: 08/17/21 1608     Updated: 08/17/21 1615    Narrative:      EXAM: XR CHEST 1 VW-     HISTORY: weakness; R73.9-Hyperglycemia, unspecified; E87.5-Hyperkalemia;  E87.4-Pvkq-rpceftnqxf and hyponatremia; R53.1-Weakness       COMPARISON: June 19, 2021.     TECHNIQUE: Frontal radiograph of the chest     FINDINGS:   Bilateral interstitial infiltrates are present. This has appearance of  pulmonary vascular congestion.. Cardiac silhouette is mildly enlarged.  Left subclavian endovascular stent graft is present..      The osseous structures and surrounding soft tissues demonstrate no acute  abnormality.          Impression:      1. Cardiomegaly with bilateral interstitial infiltrates most likely  representing mild or early changes of pulmonary vascular congestion.        This report was finalized on 08/17/2021 16:11 by Dr. Lazaro Martinez MD.          Culture:  No results found for: BLOODCX, URINECX, WOUNDCX, MRSACX, RESPCX, STOOLCX      Assessment   End-stage renal disease  Hypertension  Hyponatremia  Hyperkalemia  Anemia chronic kidney disease  Secondary hyperparathyroidism  Acute pulmonary edema    Plan:  Discussed with patient, nursing, primary  Work-up ordered and ongoing  Awaiting ICU bed for initiation of dialysis, nursing aware of critical nature  Monitor labs  Medically manage potassium until dialysis available    Thank you for the consult, we appreciate the opportunity to provide care to your patients.  Feel free to contact me if I can be of any further assistance.      Surjit Avitia MD  8/17/2021  16:29 CDT      Electronically signed by Surjit Avitia MD at 08/17/21 1840          Physical Therapy Notes (most recent note)      Jose Parker, PTA at 08/19/21 1527  Version 1 of 1          Acute Care - Physical Therapy Treatment Note  [facility]     Patient Name: Ena Upton  : 1970  MRN: [id number]  Today's Date: 2021      Visit Dx:     ICD-10-CM ICD-9-CM   1. Hyperglycemia  R73.9 790.29   2. Hyperkalemia  E87.5 276.7   3. Hyponatremia  E87.1 276.1   4. Generalized weakness  R53.1 780.79   5. Impaired mobility  Z74.09 799.89   6. 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)   • Non-ketotic hyperosmolar coma (CMS/HCC)   • HTN (hypertension)   • Intractable nausea and vomiting   • Constipation   • Gastroparesis due to DM (CMS/HCC)   • Hyperglycemia   • Seizure (CMS/HCC)   • End stage kidney disease (CMS/HCC)   • Sacral insufficiency fracture   • Hypertensive urgency   • Uncontrolled diabetes mellitus (CMS/HCC)   • Hyperkalemia     Past Medical History:   Diagnosis Date   • Anxiety    • Arthritis    • Chronic kidney disease     STAGE V RENAL FAILURE   • Depression    • Diabetes mellitus (CMS/HCC)    • HTN (hypertension)    • Hypothyroidism    • Obesity    • Tremors of nervous system      Past Surgical History:   Procedure Laterality Date   • ARTERIOVENOUS FISTULA     • BREAST BIOPSY     • CARPAL TUNNEL RELEASE     • CHOLECYSTECTOMY     • TUBAL ABDOMINAL LIGATION          PT Assessment (last 12 hours)      PT Evaluation and Treatment     Row Name 21 1500 21 1438       Physical Therapy Time and Intention    Subjective Information  complains of;weakness;fatigue;pain  -JENNIFER  --    Document Type  therapy note (daily note)  -  therapy note (daily note)  -    Mode of Treatment  physical therapy  -JENNIFER  --    Session Not Performed  --  other (see comments)  -    Comment, Session Not Performed  --  pt working with OT. WIll check back.   -    Row Name 21 0903          Physical Therapy Time and Intention    Session Not Performed  patient unavailable for  treatment  -     Comment, Session Not Performed  pt gone for dialysis  -     Row Name 08/19/21 1500          General Information    Existing Precautions/Restrictions  fall  -JENNIFER     Row Name 08/19/21 1500          Pain Scale: Word Pre/Post-Treatment    Pain: Word Scale, Pretreatment  4 - moderate pain  -JENNIFER     Posttreatment Pain Rating  4 - moderate pain  -JENNIFER     Pain Location - Side  Left  -JENNIFER     Pain Location  back  -JENNIFER     Pre/Posttreatment Pain Comment  movement in LLE increases pain, pt stated she has plans for back sx  on 8/30   -JENNIFER     Row Name 08/19/21 1500          Bed Mobility    Comment (Bed Mobility)  in the chair   -JENNIFER     Row Name 08/19/21 1500          Transfers    Sit-Stand [place] (Transfers)  verbal cues;contact guard  -JENNIFER     Row Name 08/19/21 1500          Sit-Stand Transfer    Assistive Device (Sit-Stand Transfers)  walker, front-wheeled  -JENNIFER     Row Name 08/19/21 1500          Gait/Stairs (Locomotion)    [place] Level (Gait)  verbal cues;contact guard  -JENNIFER     Assistive Device (Gait)  walker, front-wheeled  -JENNIFER     Distance in Feet (Gait)  5', 12' with sitting rest in between   -JENNIFER     Deviations/Abnormal Patterns (Gait)  flavio decreased;stride length decreased  -JENNIFER     Comment (Gait/Stairs)  pt stated that she felt extremely weak and could not amb further, did not display weakness with amb.   -JENNIFER     Row Name 08/19/21 1500          Motor Skills    Therapeutic Exercise  aerobic  -JENNIFER     Row Name 08/19/21 1500          Aerobic Exercise    Comment, Aerobic Exercise (Therapeutic Exercise)  AROM RLE X 20 , LLE X 10 while sitting in the chair   -JENNIFER     Row Name 08/19/21 1500          Positioning and Restraints    Pre-Treatment Position  sitting in chair/recliner  -JENNIFER     Post Treatment Position  chair  -JENNIFER     In Chair  sitting;call light within reach;encouraged to call for assist  -JENNIFER       User Key  (r) = Recorded By, (t) = Taken By, (c) = Cosigned By    Initials Name Provider Type     Jose Mo PTA Physical Therapy Assistant    Amada Kirby PTA Physical Therapy Assistant        Physical Therapy Education                 Title: PT OT SLP Therapies (In Progress)     Topic: Physical Therapy (In Progress)     Point: Mobility training (Done)     Learning Progress Summary           Patient Acceptance, BULMARO ENGLISH DU by  at 8/18/2021 4762    Comment: Pt educated on safety with mobility. Pt educated to not perform mobility by herself due to decreased sensation in the LE.                   Point: Home exercise program (Not Started)     Learner Progress:  Not documented in this visit.          Point: Body mechanics (Not Started)     Learner Progress:  Not documented in this visit.          Point: Precautions (Not Started)     Learner Progress:  Not documented in this visit.                      User Key     Initials Effective Dates Name Provider Type Discipline     08/04/21 -  Elinor Lovell PT Student PT Student PT              PT Recommendation and Plan     Plan of Care Reviewed With: patient  Progress: improving  Outcome Summary: Pt was sitting in the chair, eager to participate with therapy.  Transfered sit to stand with CGA.  Amb 15' with RWX and CGA.  Performed LE exercises AROM in the chair.  Will continue to work with pt to increase strength and progress amb.       Time Calculation:   PT Charges     Row Name 08/19/21 1500             Time Calculation    Start Time  1500  -JENNIFER      Stop Time  1516  -JENNIFER      Time Calculation (min)  16 min  -JENNIFER      PT Received On  08/19/21  -JENNIFER         Time Calculation- PT    Total Timed Code Minutes- PT  16 minute(s)  -JENNIFER         Timed Charges    28871 - Gait Training Minutes   16  -JENNIFER         Total Minutes    Timed Charges Total Minutes  16  -JENNIFER       Total Minutes  16  -JENNIFER        User Key  (r) = Recorded By, (t) = Taken By, (c) = Cosigned By    Initials Name Provider Type    Jose Mo PTA Physical Therapy Assistant         Therapy Charges for Today     Code Description Service Date Service Provider Modifiers Qty    63820615980 HC GAIT TRAINING EA 15 MIN 2021 Jose Parker PTA GP 1          PT G-Codes  Outcome Measure Options: AM-PAC 6 Clicks Daily Activity (OT)  AM-PAC 6 Clicks Score (PT): 17  AM-PAC 6 Clicks Score (OT): 17    Jose Parker PTA  2021      Electronically signed by Jose Parker PTA at 21 1527          Occupational Therapy Notes (most recent note)      Zack Tafoya COTA at 21 1430          Acute Care - Occupational Therapy Treatment Note  [facility]     Patient Name: Ena Upton  : 1970  MRN: [id number]  Today's Date: 2021             Admit Date: 2021       ICD-10-CM ICD-9-CM   1. Hyperglycemia  R73.9 790.29   2. Hyperkalemia  E87.5 276.7   3. Hyponatremia  E87.1 276.1   4. Generalized weakness  R53.1 780.79   5. Impaired mobility  Z74.09 799.89   6. 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)   • Non-ketotic hyperosmolar coma (CMS/HCC)   • HTN (hypertension)   • Intractable nausea and vomiting   • Constipation   • Gastroparesis due to DM (CMS/HCC)   • Hyperglycemia   • Seizure (CMS/HCC)   • End stage kidney disease (CMS/HCC)   • Sacral insufficiency fracture   • Hypertensive urgency   • Uncontrolled diabetes mellitus (CMS/HCC)   • Hyperkalemia     Past Medical History:   Diagnosis Date   • Anxiety    • Arthritis    • Chronic kidney disease     STAGE V RENAL FAILURE   • Depression    • Diabetes mellitus (CMS/HCC)    • HTN (hypertension)    • Hypothyroidism    • Obesity    • Tremors of nervous system      Past Surgical History:   Procedure Laterality Date   • ARTERIOVENOUS FISTULA     • BREAST BIOPSY     • CARPAL TUNNEL RELEASE     • CHOLECYSTECTOMY     • TUBAL ABDOMINAL LIGATION              OT ASSESSMENT FLOWSHEET (last 12 hours)      OT  Evaluation and Treatment     Row Name 08/19/21 1430                   OT Time and Intention    Subjective Information  complains of;weakness;fatigue hungry!  -        Document Type  therapy note (daily note)  -        Mode of Treatment  occupational therapy  -        Patient Effort  good  -           General Information    Existing Precautions/Restrictions  fall  -           Pain Assessment    Additional Documentation  Pain Scale: Word Pre/Post-Treatment (Group)  -           Pain Scale: Numbers Pre/Post-Treatment    Pain Intervention(s)  Repositioned;Rest  -           Bed Mobility    Comment (Bed Mobility)  up in chair!  -           Motor Skills    Motor Skills  therapeutic exercise  -        Therapeutic Exercise  shoulder;elbow/forearm  -           Shoulder (Therapeutic Exercise)    Shoulder (Therapeutic Exercise)  AROM (active range of motion);strengthening exercise  -        Shoulder AROM (Therapeutic Exercise)  bilateral;flexion;extension;sitting;10 repetitions;2 sets  -        Shoulder Strengthening (Therapeutic Exercise)  bilateral;flexion;extension;sitting;2 lb free weight;3 lb free weight;10 repetitions;2 sets  -           Elbow/Forearm (Therapeutic Exercise)    Elbow/Forearm (Therapeutic Exercise)  AROM (active range of motion);strengthening exercise  -        Elbow/Forearm AROM (Therapeutic Exercise)  bilateral;flexion;extension;sitting;10 repetitions;2 sets  -        Elbow/Forearm Strengthening (Therapeutic Exercise)  bilateral;flexion;extension;sitting;2 lb free weight;3 lb free weight;10 repetitions;2 sets  -           Positioning and Restraints    Pre-Treatment Position  sitting in chair/recliner  -        Post Treatment Position  chair  -CJ        In Chair  sitting;call light within reach;encouraged to call for assist  -           Progress Summary (OT)    Progress Toward Functional Goals (OT)  progress toward functional goals is good  -        Barriers to Overall  Progress (OT)  Fall/hungry!  -LAURA        Impairments Still Limiting Function (OT)  continue with ot poc!  -CJ          User Key  (r) = Recorded By, (t) = Taken By, (c) = Cosigned By    Initials Name Effective Dates    CJ Zack Tafoya ARIE, RACHEL 06/16/21 -            Occupational Therapy Education                 Title: PT OT SLP Therapies (In Progress)     Topic: Occupational Therapy (Done)     Point: ADL training (Done)     Description:   Instruct learner(s) on proper safety adaptation and remediation techniques during self care or transfers.   Instruct in proper use of assistive devices.              Learning Progress Summary           Patient Acceptance, E, VU by SAM at 8/18/2021 1544                   Point: Home exercise program (Done)     Description:   Instruct learner(s) on appropriate technique for monitoring, assisting and/or progressing therapeutic exercises/activities.              Learning Progress Summary           Patient Acceptance, E,TB, VU,DU,NR by LAURA at 8/19/2021 1430    Comment: Pt. sitting in chair, performed ue exs to increase her act. xochilt and adl performance!                   Point: Precautions (Done)     Description:   Instruct learner(s) on prescribed precautions during self-care and functional transfers.              Learning Progress Summary           Patient Acceptance, E,TB, VU,DU,NR by LAURA at 8/19/2021 1430    Comment: Pt. sitting in chair, performed ue exs to increase her act. xochilt and adl performance!    Acceptance, E, VU by SAM at 8/18/2021 1544                   Point: Body mechanics (Done)     Description:   Instruct learner(s) on proper positioning and spine alignment during self-care, functional mobility activities and/or exercises.              Learning Progress Summary           Patient Acceptance, E,TB, VU,DU,NR by LAURA at 8/19/2021 1430    Comment: Pt. sitting in chair, performed ue exs to increase her act. xochilt and adl performance!                               User Key      Initials Effective Dates Name Provider Type Discipline     06/16/21 -  Zack Tafoya COTA Occupational Therapy Assistant OT    JSCOTT 06/16/21 -  Desire Keita OTR/L Occupational Therapist OT                  OT Recommendation and Plan     Progress Toward Functional Goals (OT): progress toward functional goals is good  Plan of Care Review  Plan of Care Reviewed With: patient  Progress: improving  Outcome Summary: No problems with pt. performing ue exs to increase her act. xochilt and adl tasks!  Plan of Care Reviewed With: patient  Outcome Summary: No problems with pt. performing ue exs to increase her act. xochilt and adl tasks!    Outcome Measures     Row Name 08/19/21 1430             How much help from another is currently needed...    Putting on and taking off regular lower body clothing?  2  -CJ      Bathing (including washing, rinsing, and drying)  2  -CJ      Toileting (which includes using toilet bed pan or urinal)  2  -CJ      Putting on and taking off regular upper body clothing  3  -CJ      Taking care of personal grooming (such as brushing teeth)  4  -CJ      Eating meals  4  -CJ      AM-PAC 6 Clicks Score (OT)  17  -         Functional Assessment    Outcome Measure Options  AM-PAC 6 Clicks Daily Activity (OT)  -        User Key  (r) = Recorded By, (t) = Taken By, (c) = Cosigned By    Initials Name Provider Type     Zack Tafoya COTA Occupational Therapy Assistant          Time Calculation:   Time Calculation- OT     Row Name 08/19/21 1500 08/19/21 1430          Time Calculation- OT    OT Start Time  --  1430  -     OT Stop Time  --  1458  -     OT Time Calculation (min)  --  28 min  -     Total Timed Code Minutes- OT  --  28 minute(s)  -     OT Received On  --  08/19/21  -        Timed Charges    60404 - OT Therapeutic Exercise Minutes  --  28  -     35142 - Gait Training Minutes   16  -JENNIFER  --        Total Minutes    Timed Charges Total Minutes  16  -JENNIFER  28  -      Total  Minutes  16  -JENNIFER  28  -CJ       User Key  (r) = Recorded By, (t) = Taken By, (c) = Cosigned By    Initials Name Provider Type     Zack Tafoya COTA Occupational Therapy Assistant    Jose Mo, JOSE Physical Therapy Assistant        Therapy Charges for Today     Code Description Service Date Service Provider Modifiers Qty    [id number] HC OT THER PROC EA 15 MIN 8/19/2021 Zack Tafoya COTA GO 2               RACHEL Santiago  8/19/2021    Electronically signed by Zack Tafoya COTA at 08/19/21 3934

## 2021-08-20 NOTE — PLAN OF CARE
Goal Outcome Evaluation:  Plan of Care Reviewed With: patient        Progress: improving  Outcome Summary: Pt. agreeable to therapy. Pt was CGA for transfers and gait. She was able to walk 50' with RWx. She continues to c/o weakness, back pain, and numbness in leg. Will continue to work on strengthening.

## 2021-08-20 NOTE — DISCHARGE SUMMARY
[facility] Medicine Services  DISCHARGE SUMMARY       Date of Admission: 8/17/2021  Date of Discharge:  8/20/2021  Primary Care Physician: Norm Wiggins APRN    Presenting Problem/History of Present Illness:  Hyperglycemia [R73.9]     Final Discharge Diagnoses:  Active Hospital Problems    Diagnosis    • Hyperkalemia    • Uncontrolled diabetes mellitus (CMS/HCC)    • End stage kidney disease (CMS/HCC)    • Hyperglycemia    • Gastroparesis due to DM (CMS/HCC)    • Intractable nausea and vomiting        Consults: Nephrology    Pertinent Test Results:     IMPRESSION: Chest x-ray.  1. Cardiomegaly with bilateral interstitial infiltrates most likely  representing mild or early changes of pulmonary vascular congestion.    Chief Complaint on Day of Discharge: none    History of Present Illness on Day of Discharge:   Patient is a 51-year-old  female came to the ER for evaluation of hyperglycemia and weakness.  Patient has missed his dialysis appointment.  ER physician discussed plan for dialysis today.  Patient is a chronic diabetes, glucose was in the thousand in ER.  Patient will have dialysis Tuesday Thursday and Saturday.  Patient missed Tuesday only.  Patient states she feels weak all day long and check her sugar is too high to read.  Patient was so weak to get around family proceeded to call 911.  Patient was brought to ER to be evaluated.  Patient is on chronic dialysis.    Hospital Course:  The patient is a 51 y.o. female who presented to [facility] with diabetes/chronic renal failure on dialysis/hyperkalemia.      DM/Hyperglycemia.  Sugar stable.  Hemoglobin A1c 10.6.  Sliding scale.  Levemir.     Chronic renal failure on dialysis.    Nephrology consult.  Dialysis per nephrology.  Patient usually get dialysis Tuesday Thursday and Saturday.     Hyperkalemia.  Resolved.     Nausea/vomiting.  Pepcid.  Zofran.     Hypertension/hyperlipidemia.  Coreg.  " Catapres.  Hydralazine.  Hold lisinopril due to hyperkalemia.  Procardia.  Zocor.     COPD.  Albuterol nebulizer as needed.  Chest x-ray-Cardiomegaly with bilateral interstitial infiltrates most likely representing mild or early changes of pulmonary vascular congestion.     Anemia.    Hemoglobin stable. No sign of acute bleed.  Erythropoietin.  Anemia panel.     TSH elevated.  Free T4-normal     Anxiety/depression.  Cymbalta.     Neuropathy.  Neurontin.  Requip at night.     Chronic smoker.  Nicotine patch.  Discussed patient cutting back and stopping.     Constipation . Lactulose.  Senokot.     Hypothyroidism.  Synthroid.     Deconditioning.  PT and OT consult.     Covid-19-negative.     Vital signs stable, afebrile.  No rehab in particular except her.  Patient refused to go outside of [place].  Patient wants to go home.  Patient go home with home health and a wheelchair and a walker.    Condition on Discharge: Stable.    Physical Exam on Discharge:  /70 (BP Location: Right arm, Patient Position: Lying)   Pulse 64   Temp 97.9 °F (36.6 °C) (Oral)   Resp 16   Ht 170.2 cm (67.01\")   Wt 60.4 kg (133 lb 1.6 oz)   LMP  (LMP Unknown)   SpO2 98%   BMI 20.84 kg/m²   Physical Exam  Vitals and nursing note reviewed.   Constitutional:       Appearance: She is well-developed.   HENT:      Head: Normocephalic.   Eyes:      Conjunctiva/sclera: Conjunctivae normal.      Pupils: Pupils are equal, round, and reactive to light.   Neck:      Vascular: No JVD.   Cardiovascular:      Rate and Rhythm: Normal rate and regular rhythm.      Heart sounds: Normal heart sounds. No murmur heard.   No friction rub. No gallop.    Pulmonary:      Effort: No respiratory distress.      Breath sounds: No wheezing or rales.      Comments: Diminished breath sound, clear .  Chest:      Chest wall: No tenderness.   Abdominal:      General: Bowel sounds are normal. There is no distension.      Palpations: Abdomen is soft.      Tenderness: " There is no abdominal tenderness. There is no guarding or rebound.   Musculoskeletal:         General: No tenderness or deformity. Normal range of motion.      Cervical back: Neck supple.   Skin:     General: Skin is warm and dry.      Capillary Refill: Capillary refill takes 2 to 3 seconds.      Findings: No rash.   Neurological:      General: No focal deficit present.      Mental Status: She is alert and oriented to person, place, and time.      Cranial Nerves: No cranial nerve deficit.      Motor: Weakness present. No abnormal muscle tone.      Deep Tendon Reflexes: Reflexes normal.   Psychiatric:         Behavior: Behavior normal.         Thought Content: Thought content normal.     Discharge Disposition:  Home or Self Care    Discharge Medications:     Discharge Medications      New Medications      Instructions Start Date   famotidine 20 MG tablet  Commonly known as: PEPCID   20 mg, Oral, Daily   Start Date: August 21, 2021     nicotine 14 MG/24HR patch  Commonly known as: NICODERM CQ   1 patch, Transdermal, Every 24 Hours Scheduled, Take off it pt smoke.   Start Date: August 21, 2021        Continue These Medications      Instructions Start Date   albuterol sulfate  (90 Base) MCG/ACT inhaler  Commonly known as: PROVENTIL HFA;VENTOLIN HFA;PROAIR HFA   2 puffs, Inhalation, Every 4 Hours PRN      carvedilol 12.5 MG tablet  Commonly known as: COREG   12.5 mg, Oral, 2 Times Daily With Meals      cloNIDine 0.2 MG tablet  Commonly known as: CATAPRES   0.2 mg, Oral, Every 8 Hours Scheduled      DULoxetine 60 MG capsule  Commonly known as: CYMBALTA   60 mg, Oral, Daily      hydrALAZINE 50 MG tablet  Commonly known as: APRESOLINE   50 mg, Oral, 3 Times Daily      lactulose 10 GM/15ML solution  Commonly known as: CHRONULAC   20 g, Oral, 3 Times Daily      levothyroxine 25 MCG tablet  Commonly known as: SYNTHROID, LEVOTHROID   25 mcg, Oral, Daily      lisinopril 20 MG tablet  Commonly known as: PRINIVIL,ZESTRIL    20 mg, Oral, 2 times daily      NIFEdipine XL 60 MG 24 hr tablet  Commonly known as: PROCARDIA XL   60 mg, Oral, 2 times daily      ondansetron 4 MG tablet  Commonly known as: ZOFRAN   4 mg, Oral, Every 8 Hours PRN      pregabalin 75 MG capsule  Commonly known as: LYRICA   75 mg, Oral, 2 Times Daily      rOPINIRole 4 MG tablet  Commonly known as: REQUIP   4 mg, Oral, Nightly      senna 8.6 MG tablet  Commonly known as: SENOKOT   1 tablet, Oral, Nightly      simvastatin 20 MG tablet  Commonly known as: ZOCOR   20 mg, Oral, Daily         Stop These Medications    cloNIDine 0.3 MG/24HR patch  Commonly known as: CATAPRES-TTS            Discharge Diet:   Diet Instructions     Advance Diet As Tolerated            Activity at Discharge:   Activity Instructions     Activity as Tolerated            Discharge Care Plan/Instructions: Discharge home with family.  Patient go home with home health, walker, wheelchair.    Follow-up Appointments:   Abdomen painFollow with PCP 1 week.  Follow with dialysis Saturday Tuesday and Thursday.    Electronically signed by Narinder Madrid MD, 08/20/21, 15:05 CDT.    Time: Greater than 30 minutes.

## 2021-08-20 NOTE — PLAN OF CARE
Goal Outcome Evaluation:           Progress: improving   VSS. No C/O pain. AAOX4. Continues on room air with saturation in the 90's. Sinus 62-68 on tele. BP remain sufficient with BP medication. Continues glucose monitoring. Continues dialysis. Fistula site has thrill. Dialysis site CDI. Medication education provided. Safety maintained.

## 2021-08-20 NOTE — PROGRESS NOTES
[facility] Medicine Services  INPATIENT PROGRESS NOTE    Length of Stay: 3  Date of Admission: 8/17/2021  Primary Care Physician: Norm Wiggins APRN    Subjective   Chief Complaint: Weakness/hyperkalemia/dialysis    HPI   Plan for rehab placement.  Hyperkalemia resolved.  Patient is currently working physical therapy.  Patient having chest pain.  Patient still remain very weak.  Plan for rehab placement.    Review of Systems   Constitutional: Positive for activity change, appetite change and fatigue. Negative for chills and fever.   HENT: Negative for hearing loss, nosebleeds, tinnitus and trouble swallowing.    Eyes: Negative for visual disturbance.   Respiratory: Negative for cough, chest tightness, shortness of breath and wheezing.    Cardiovascular: Negative for chest pain, palpitations and leg swelling.   Gastrointestinal: Negative for abdominal distention, abdominal pain, blood in stool, constipation, diarrhea, nausea and vomiting.   Endocrine: Negative for cold intolerance, heat intolerance, polydipsia, polyphagia and polyuria.   Genitourinary: Negative for decreased urine volume, difficulty urinating, dysuria, flank pain, frequency and hematuria.   Musculoskeletal: Positive for arthralgias, gait problem and myalgias. Negative for joint swelling.   Skin: Negative for rash.   Allergic/Immunologic: Negative for immunocompromised state.   Neurological: Positive for weakness. Negative for dizziness, syncope, light-headedness and headaches.   Hematological: Negative for adenopathy. Does not bruise/bleed easily.   Psychiatric/Behavioral: Negative for confusion and sleep disturbance. The patient is not nervous/anxious.      All pertinent negatives and positives are as above. All other systems have been reviewed and are negative unless otherwise stated.     Objective    Temp:  [97.8 °F (36.6 °C)-98.8 °F (37.1 °C)] 97.8 °F (36.6 °C)  Heart Rate:  [61-69] 61  Resp:  [16] 16  BP:  ()/(48-97) 159/64    Intake/Output Summary (Last 24 hours) at 8/20/2021 1005  Last data filed at 8/20/2021 0454  Gross per 24 hour   Intake 120 ml   Output --   Net 120 ml     Physical Exam  Vitals and nursing note reviewed.   Constitutional:       Appearance: She is well-developed.   HENT:      Head: Normocephalic.   Eyes:      Conjunctiva/sclera: Conjunctivae normal.      Pupils: Pupils are equal, round, and reactive to light.   Neck:      Vascular: No JVD.   Cardiovascular:      Rate and Rhythm: Normal rate and regular rhythm.      Heart sounds: Normal heart sounds. No murmur heard.   No friction rub. No gallop.    Pulmonary:      Effort: No respiratory distress.      Breath sounds: No wheezing or rales.      Comments: Diminished breath sound, clear .  Chest:      Chest wall: No tenderness.   Abdominal:      General: Bowel sounds are normal. There is no distension.      Palpations: Abdomen is soft.      Tenderness: There is no abdominal tenderness. There is no guarding or rebound.   Musculoskeletal:         General: No tenderness or deformity. Normal range of motion.      Cervical back: Neck supple.   Skin:     General: Skin is warm and dry.      Capillary Refill: Capillary refill takes 2 to 3 seconds.      Findings: No rash.   Neurological:      General: No focal deficit present.      Mental Status: She is alert and oriented to person, place, and time.      Cranial Nerves: No cranial nerve deficit.      Motor: Weakness present. No abnormal muscle tone.      Coordination: Coordination abnormal.      Gait: Gait abnormal.      Deep Tendon Reflexes: Reflexes normal.   Psychiatric:         Behavior: Behavior normal.         Thought Content: Thought content normal.   Results Review:  Lab Results (last 24 hours)     Procedure Component Value Units Date/Time    POC Glucose Once [[id number]]  (Normal) Collected: 08/20/21 0824    Specimen: Blood Updated: 08/20/21 0839     Glucose 87 mg/dL      Comment: :  [id number] Ramos LisaMeter ID: [id number]       POC Glucose Once [[id number]]  (Normal) Collected: 08/20/21 0645    Specimen: Blood Updated: 08/20/21 0655     Glucose 71 mg/dL      Comment: : [id number] Cele Alaniser ID: [id number]       Basic Metabolic Panel [[id number]]  (Abnormal) Collected: 08/20/21 0518    Specimen: Blood Updated: 08/20/21 0631     Glucose 47 mg/dL      BUN 24 mg/dL      Creatinine 2.58 mg/dL      Sodium 136 mmol/L      Potassium 4.7 mmol/L      Comment: Slight hemolysis detected by analyzer. Results may be affected.        Chloride 97 mmol/L      CO2 29.0 mmol/L      Calcium 9.1 mg/dL      eGFR Non African Amer 20 mL/min/1.73      BUN/Creatinine Ratio 9.3     Anion Gap 10.0 mmol/L     Narrative:      GFR Normal >60  Chronic Kidney Disease <60  Kidney Failure <15      CBC & Differential [[id number]]  (Abnormal) Collected: 08/20/21 0518    Specimen: Blood Updated: 08/20/21 0555    Narrative:      The following orders were created for panel order CBC & Differential.  Procedure                               Abnormality         Status                     ---------                               -----------         ------                     CBC Auto Differential[[id number]]        Abnormal            Final result                 Please view results for these tests on the individual orders.    CBC Auto Differential [[id number]]  (Abnormal) Collected: 08/20/21 0518    Specimen: Blood Updated: 08/20/21 0555     WBC 5.27 10*3/mm3      RBC 3.10 10*6/mm3      Hemoglobin 8.9 g/dL      Hematocrit 28.2 %      MCV 91.0 fL      MCH 28.7 pg      MCHC 31.6 g/dL      RDW 14.4 %      RDW-SD 48.0 fl      MPV 10.0 fL      Platelets 185 10*3/mm3      Neutrophil % 54.8 %      Lymphocyte % 25.4 %      Monocyte % 11.6 %      Eosinophil % 6.5 %      Basophil % 1.3 %      Immature Grans % 0.4 %      Neutrophils, Absolute 2.89 10*3/mm3      Lymphocytes, Absolute 1.34 10*3/mm3      Monocytes, Absolute 0.61 10*3/mm3       Eosinophils, Absolute 0.34 10*3/mm3      Basophils, Absolute 0.07 10*3/mm3      Immature Grans, Absolute 0.02 10*3/mm3      nRBC 0.0 /100 WBC     Folate [[id number]]  (Normal) Collected: 08/19/21 1215    Specimen: Blood Updated: 08/19/21 2035     Folate 5.98 ng/mL     Narrative:      Results may be falsely increased if patient taking Biotin.      Vitamin B12 [[id number]]  (Normal) Collected: 08/19/21 1215    Specimen: Blood Updated: 08/19/21 2035     Vitamin B-12 536 pg/mL     Narrative:      Results may be falsely increased if patient taking Biotin.      POC Glucose Once [[id number]]  (Abnormal) Collected: 08/19/21 1950    Specimen: Blood Updated: 08/19/21 2020     Glucose 139 mg/dL      Comment: : [id number] Humble JackniferMeter ID: [id number]       POC Glucose Once [[id number]]  (Abnormal) Collected: 08/19/21 1705    Specimen: Blood Updated: 08/19/21 1719     Glucose 446 mg/dL      Comment: : [id number] Erinn Charter CommunicationsiaMeter ID: [id number]       POC Glucose Once [[id number]]  (Abnormal) Collected: 08/19/21 1704    Specimen: Blood Updated: 08/19/21 1719     Glucose 445 mg/dL      Comment: : [id number] Erinn Charter CommunicationsiaMeter ID: [id number]              Cultures:  No results found for: BLOODCX, URINECX, WOUNDCX, MRSACX, RESPCX, STOOLCX    Radiology Data:    Imaging Results (Last 24 Hours)     ** No results found for the last 24 hours. **          Allergies   Allergen Reactions   • Lamisil [Terbinafine]    • Penicillins    • Reglan [Metoclopramide] GI Intolerance   • Stadol [Butorphanol] Nausea And Vomiting       Scheduled meds:   atorvastatin, 10 mg, Oral, Daily  carvedilol, 12.5 mg, Oral, BID With Meals  cloNIDine, 0.2 mg, Oral, Q8H  DULoxetine, 30 mg, Oral, Daily  epoetin yolanda/yolanda-epbx, 5,000 Units, Intravenous, Once per day on Mon Wed Fri  famotidine, 20 mg, Oral, Daily  hydrALAZINE, 50 mg, Oral, TID  insulin lispro, 0-24 Units, Subcutaneous, 4x Daily With Meals & Nightly  lisinopril, 20 mg, Oral, Q12H  nicotine, 1  patch, Transdermal, Q24H  NIFEdipine XL, 90 mg, Oral, Q24H  pregabalin, 75 mg, Oral, Q12H  rOPINIRole, 4 mg, Oral, Nightly  senna, 1 tablet, Oral, Nightly  sodium chloride, 10 mL, Intravenous, Q12H  sodium polystyrene, 15 g, Oral, Once        PRN meds:  •  acetaminophen  •  albuterol  •  dextrose  •  dextrose  •  glucagon (human recombinant)  •  HYDROcodone-acetaminophen  •  ondansetron  •  [COMPLETED] Insert peripheral IV **AND** sodium chloride  •  sodium chloride    Assessment/Plan       Intractable nausea and vomiting    Gastroparesis due to DM (CMS/HCC)    Hyperglycemia    End stage kidney disease (CMS/HCC)    Uncontrolled diabetes mellitus (CMS/HCC)    Hyperkalemia      Plan:  DM/Hyperglycemia.  Sugar stable.  Hemoglobin A1c 10.6.  Sliding scale.  Levemir.     Chronic renal failure on dialysis.    Nephrology consult.  Dialysis per nephrology.  Patient usually get dialysis Tuesday Thursday and Saturday.     Hyperkalemia.  Resolved.     Nausea/vomiting.  Pepcid.  Zofran.     Hypertension/hyperlipidemia.  Coreg.  Catapres.  Hydralazine.  Hold lisinopril due to hyperkalemia.  Procardia.  Zocor.     COPD.  Albuterol nebulizer as needed.  Chest x-ray-Cardiomegaly with bilateral interstitial infiltrates most likely representing mild or early changes of pulmonary vascular congestion.     Anemia.    Hemoglobin stable. No sign of acute bleed.  Erythropoietin.  Anemia panel.     TSH elevated.  Free T4-normal     Anxiety/depression.  Cymbalta.     Neuropathy.  Neurontin.  Requip at night.     Chronic smoker.  Nicotine patch.  Discussed patient cutting back and stopping.     Constipation . Lactulose.  Senokot.     Hypothyroidism.  Synthroid.     Deconditioning.  PT and OT consult.     Covid-19-negative.     Discharge Planning: Rehab placement pending.    Electronically signed by Narinder Madrid MD, 08/20/21, 10:05 AM CDT.

## 2021-08-21 NOTE — THERAPY DISCHARGE NOTE
Acute Care - Occupational Therapy Discharge Summary  [facility]     Patient Name: Ena Upton  : 1970  MRN: [id number]    Today's Date: 2021                 Admit Date: 2021        OT Recommendation and Plan    Visit Dx:    ICD-10-CM ICD-9-CM   1. Hyperglycemia  R73.9 790.29   2. Hyperkalemia  E87.5 276.7   3. Hyponatremia  E87.1 276.1   4. Generalized weakness  R53.1 780.79   5. Impaired mobility  Z74.09 799.89   6. Decreased activities of daily living (ADL)  Z78.9 V49.89   7. Uncontrolled diabetes mellitus of other type with hypoglycemia, unspecified hypoglycemia coma status (CMS/[facility])  E13.649 250.82   8. Hypertensive urgency  I16.0 401.9   9. Sacral insufficiency fracture, sequela  M84.48XS 905.1   10. End stage kidney disease (CMS/[facility])  N18.6 585.6   11. Seizure (CMS/HCC)  R56.9 780.39   12. Gastroparesis due to DM (CMS/[facility])  E11.43 250.60    K31.84 536.3   13. Constipation, unspecified constipation type  K59.00 564.00   14. Intractable nausea and vomiting  R11.2 536.2   15. Secondary hypertension  I15.9 405.99   16. Non-ketotic hyperosmolar coma (CMS/[facility])  E11.01 250.20   17. CKD stage 4 due to type 2 diabetes mellitus (CMS/HCC)  E11.22 250.40    N18.4 585.4         Time Calculation- OT     Row Name 21 1330 21 0956          Time Calculation- OT    OT Start Time  --  0956  -     OT Stop Time  --  1023  -     OT Time Calculation (min)  --  27 min  -     Total Timed Code Minutes- OT  --  27 minute(s)  -     OT Received On  --  21  -        Timed Charges    37581 - OT Therapeutic Exercise Minutes  --       65612 - Gait Training Minutes   10  -MF  --        Total Minutes    Timed Charges Total Minutes  10  -MF  27  -CJ      Total Minutes  10  -MF    -       User Key  (r) = Recorded By, (t) = Taken By, (c) = Cosigned By    Initials Name Provider Type    Zack Whipple COTA Occupational Therapy Assistant    Amada Kirby, PTA Physical Therapy  Assistant            OT Rehab Goals     Row Name 08/20/21 1900             Dressing Goal 1 (OT)    Activity/Device (Dressing Goal 1, OT)  dressing skills, all  -AC      [place]/Cues Needed (Dressing Goal 1, OT)  contact guard assist  -AC      Time Frame (Dressing Goal 1, OT)  long term goal (LTG);by discharge  -AC      Progress/Outcome (Dressing Goal 1, OT)  goal not met  -AC         Toileting Goal 1 (OT)    Activity/Device (Toileting Goal 1, OT)  toileting skills, all  -AC      [place] Level/Cues Needed (Toileting Goal 1, OT)  independent  -AC      Time Frame (Toileting Goal 1, OT)  long term goal (LTG);by discharge  -AC      Progress/Outcome (Toileting Goal 1, OT)  goal not met  -AC        User Key  (r) = Recorded By, (t) = Taken By, (c) = Cosigned By    Initials Name Provider Type Discipline    AC Lobito Saunders, OTR/L, CNT Occupational Therapist OT          Outcome Measures     Row Name 08/20/21 0956 08/19/21 1430          How much help from another is currently needed...    Putting on and taking off regular lower body clothing?  2  -CJ  2  -CJ     Bathing (including washing, rinsing, and drying)  2  -CJ  2  -CJ     Toileting (which includes using toilet bed pan or urinal)  2  -CJ  2  -CJ     Putting on and taking off regular upper body clothing  3  -CJ  3  -CJ     Taking care of personal grooming (such as brushing teeth)  4  -CJ  4  -CJ     Eating meals  4  -  4  -CJ     AM-PAC 6 Clicks Score (OT)  17  -  17  -        Functional Assessment    Outcome Measure Options  AM-PAC 6 Clicks Daily Activity (OT)  -CJ  AM-PAC 6 Clicks Daily Activity (OT)  -CJ       User Key  (r) = Recorded By, (t) = Taken By, (c) = Cosigned By    Initials Name Provider Type    Zack Whipple COTA Occupational Therapy Assistant          Timed Therapy Charges  Total Units: 2    Charges  Total Units: 2    Procedure Name Documented Minutes Units Code    HC OT THER PROC EA 15 MIN 27  2    29160 (CPT®)                Documented Minutes  Total Minutes: 27    Therapy Provided Minutes    14920 - OT Therapeutic Exercise Minutes 27                    OT Discharge Summary  Anticipated Discharge Disposition (OT): home with assist  Reason for Discharge: Discharge from facility  Outcomes Achieved: Refer to plan of care for updates on goals achieved  Discharge Destination: Home with assist      Lobito Saunders, OTR/L, NIYA  8/20/2021

## 2021-08-21 NOTE — THERAPY DISCHARGE NOTE
Acute Care - Physical Therapy Discharge Summary  [facility]       Patient Name: Ena Upton  : 1970  MRN: [id number]    Today's Date: 2021                 Admit Date: 2021      PT Recommendation and Plan    Visit Dx:    ICD-10-CM ICD-9-CM   1. Hyperglycemia  R73.9 790.29   2. Hyperkalemia  E87.5 276.7   3. Hyponatremia  E87.1 276.1   4. Generalized weakness  R53.1 780.79   5. Impaired mobility  Z74.09 799.89   6. Decreased activities of daily living (ADL)  Z78.9 V49.89   7. Uncontrolled diabetes mellitus of other type with hypoglycemia, unspecified hypoglycemia coma status (CMS/[facility])  E13.649 250.82   8. Hypertensive urgency  I16.0 401.9   9. Sacral insufficiency fracture, sequela  M84.48XS 905.1   10. End stage kidney disease (CMS/HCC)  N18.6 585.6   11. Seizure (CMS/HCC)  R56.9 780.39   12. Gastroparesis due to DM (CMS/HCC)  E11.43 250.60    K31.84 536.3   13. Constipation, unspecified constipation type  K59.00 564.00   14. Intractable nausea and vomiting  R11.2 536.2   15. Secondary hypertension  I15.9 405.99   16. Non-ketotic hyperosmolar coma (CMS/HCC)  E11.01 250.20   17. CKD stage 4 due to type 2 diabetes mellitus (CMS/HCC)  E11.22 250.40    N18.4 585.4       Outcome Measures     Row Name 21 0956 21 1430          How much help from another is currently needed...    Putting on and taking off regular lower body clothing?  2  -CJ  2  -CJ     Bathing (including washing, rinsing, and drying)  2  -CJ  2  -CJ     Toileting (which includes using toilet bed pan or urinal)  2  -CJ  2  -CJ     Putting on and taking off regular upper body clothing  3  -CJ  3  -CJ     Taking care of personal grooming (such as brushing teeth)  4  -CJ  4  -CJ     Eating meals  4  -CJ  4  -CJ     AM-PAC 6 Clicks Score (OT)  17  -CJ  17  -CJ        Functional Assessment    Outcome Measure Options  AM-PAC 6 Clicks Daily Activity (OT)  -CJ  AM-PAC 6 Clicks Daily Activity (OT)  -CJ       User Key  (r) = Recorded  By, (t) = Taken By, (c) = Cosigned By    Initials Name Provider Type    Zack Whipple COTA Occupational Therapy Assistant              PT Rehab Goals     Row Name 08/21/21 0747             Bed Mobility Goal 1 (PT)    Activity/Assistive Device (Bed Mobility Goal 1, PT)  sit to supine/supine to sit  -      [place] Level/Cues Needed (Bed Mobility Goal 1, PT)  independent  -AH      Time Frame (Bed Mobility Goal 1, PT)  long term goal (LTG);10 days  -AH      Progress/Outcomes (Bed Mobility Goal 1, PT)  goal not met  -AH         Transfer Goal 1 (PT)    Activity/Assistive Device (Transfer Goal 1, PT)  sit-to-stand/stand-to-sit;bed-to-chair/chair-to-bed  -AH      [place] Level/Cues Needed (Transfer Goal 1, PT)  supervision required  -AH      Time Frame (Transfer Goal 1, PT)  long term goal (LTG);10 days  -AH      Progress/Outcome (Transfer Goal 1, PT)  goal not met  -         Gait Training Goal 1 (PT)    Activity/Assistive Device (Gait Training Goal 1, PT)  gait (walking locomotion);decrease fall risk  -      [place] Level (Gait Training Goal 1, PT)  supervision required;1 person assist  -AH      Distance (Gait Training Goal 1, PT)  100  -AH      Time Frame (Gait Training Goal 1, PT)  long term goal (LTG);10 days  -AH      Strategies/Barriers (Gait Training Goal 1, PT)  Pt has weakness that inhibits her activity tolerance.  -AH      Progress/Outcome (Gait Training Goal 1, PT)  goal not met  -         Stairs Goal 1 (PT)    Activity/Assistive Device (Stairs Goal 1, PT)  stairs, all skills  -AH      [place] Level/Cues Needed (Stairs Goal 1, PT)  supervision required  -      Number of Stairs (Stairs Goal 1, PT)  5  -AH      Time Frame (Stairs Goal 1, PT)  long term goal (LTG);10 days  -AH      Strategies/Barriers (Stairs Goal 1, PT)  Pt presents with weakness that inhibits her ability to perform stairs at this time.  -AH      Progress/Outcome (Stairs Goal 1, PT)  goal not met  -AH         User Key  (r) = Recorded By, (t) = Taken By, (c) = Cosigned By    Initials Name Provider Type Discipline     Mirian Crabtree PTA Physical Therapy Assistant PT              PT Discharge Summary  Reason for Discharge: Discharge from facility  Outcomes Achieved: Refer to plan of care for updates on goals achieved  Discharge Destination: Home      Mirian Crabtree PTA   8/21/2021

## 2021-08-21 NOTE — PAYOR COMM NOTE
"DC HOME 8-20-21  [id number]    Chemo pUton (51 y.o. Female)     Date of Birth Social Security Number Address Home Phone MRN    1970  [id number] [facility] [id number] [phone] [id number]    [facility]vent Marital Status          Other        Admission Date Admission Type Admitting Provider Attending Provider Department, Room/Bed    8/17/21 Emergency Narinder Madrid MD  [facility] 4B, 445/1    Discharge Date Discharge Disposition Discharge Destination        8/20/2021 Home or Self Care              Attending Provider: (none)   Allergies: Lamisil [Terbinafine], Penicillins, Reglan [Metoclopramide], Stadol [Butorphanol]    Isolation: None   Infection: None   Code Status: Prior    Ht: 170.2 cm (67.01\")   Wt: 60.4 kg (133 lb 1.6 oz)    Admission Cmt: None   Principal Problem: None                Active Insurance as of 8/17/2021     Primary Coverage     Payor Plan Insurance Group Employer/Plan Group    [facility] MEDICARE REPLACEMENT [employer] MEDICARE REPLACEMENT      Payor Plan Address Payor Plan Phone Number Payor Plan Fax Number Effective Dates    [address] [address] [phone]  6/1/2021 - None Entered    [facility] [address]       Subscriber Name Subscriber Birth Date Member ID       CHEMO UPTON 1970 [id number]           Secondary Coverage     Payor Plan Insurance Group Employer/Plan Group    [place]      Payor Plan Address Payor Plan Phone Number Payor Plan Fax Number Effective Dates    [address] [phone]  8/17/2021 - None Entered    [facility] [id number]       Subscriber Name Subscriber Birth Date Member ID       CHEMO UPTON 1970 [id number]                 Emergency Contacts      (Rel.) Home Phone Work Phone Mobile Phone    Dmitriy Galvez (Friend) -- -- [phone]    Kassandra Paez (Sister) [phone] -- [phone]               Discharge Summary      Narinder Madrid MD at 08/20/21 1451              [facility] " [facility] Medicine Services  DISCHARGE SUMMARY       Date of Admission: 8/17/2021  Date of Discharge:  8/20/2021  Primary Care Physician: Norm Wiggins APRN    Presenting Problem/History of Present Illness:  Hyperglycemia [R73.9]     Final Discharge Diagnoses:  Active Hospital Problems    Diagnosis    • Hyperkalemia    • Uncontrolled diabetes mellitus (CMS/HCC)    • End stage kidney disease (CMS/HCC)    • Hyperglycemia    • Gastroparesis due to DM (CMS/HCC)    • Intractable nausea and vomiting        Consults: Nephrology    Pertinent Test Results:     IMPRESSION: Chest x-ray.  1. Cardiomegaly with bilateral interstitial infiltrates most likely  representing mild or early changes of pulmonary vascular congestion.    Chief Complaint on Day of Discharge: none    History of Present Illness on Day of Discharge:   Patient is a 51-year-old  female came to the ER for evaluation of hyperglycemia and weakness.  Patient has missed his dialysis appointment.  ER physician discussed plan for dialysis today.  Patient is a chronic diabetes, glucose was in the thousand in ER.  Patient will have dialysis Tuesday Thursday and Saturday.  Patient missed Tuesday only.  Patient states she feels weak all day long and check her sugar is too high to read.  Patient was so weak to get around family proceeded to call 911.  Patient was brought to ER to be evaluated.  Patient is on chronic dialysis.    Hospital Course:  The patient is a 51 y.o. female who presented to [facility] with diabetes/chronic renal failure on dialysis/hyperkalemia.      DM/Hyperglycemia.  Sugar stable.  Hemoglobin A1c 10.6.  Sliding scale.  Levemir.     Chronic renal failure on dialysis.    Nephrology consult.  Dialysis per nephrology.  Patient usually get dialysis Tuesday Thursday and Saturday.     Hyperkalemia.  Resolved.     Nausea/vomiting.  Pepcid.  Zofran.     Hypertension/hyperlipidemia.  Coreg.  Catapres.  Hydralazine.  Hold  "lisinopril due to hyperkalemia.  Procardia.  Zocor.     COPD.  Albuterol nebulizer as needed.  Chest x-ray-Cardiomegaly with bilateral interstitial infiltrates most likely representing mild or early changes of pulmonary vascular congestion.     Anemia.    Hemoglobin stable. No sign of acute bleed.  Erythropoietin.  Anemia panel.     TSH elevated.  Free T4-normal     Anxiety/depression.  Cymbalta.     Neuropathy.  Neurontin.  Requip at night.     Chronic smoker.  Nicotine patch.  Discussed patient cutting back and stopping.     Constipation . Lactulose.  Senokot.     Hypothyroidism.  Synthroid.     Deconditioning.  PT and OT consult.     Covid-19-negative.     Vital signs stable, afebrile.  No rehab in particular except her.  Patient refused to go outside of [place].  Patient wants to go home.  Patient go home with home health and a wheelchair and a walker.    Condition on Discharge: Stable.    Physical Exam on Discharge:  /70 (BP Location: Right arm, Patient Position: Lying)   Pulse 64   Temp 97.9 °F (36.6 °C) (Oral)   Resp 16   Ht 170.2 cm (67.01\")   Wt 60.4 kg (133 lb 1.6 oz)   LMP  (LMP Unknown)   SpO2 98%   BMI 20.84 kg/m²   Physical Exam  Vitals and nursing note reviewed.   Constitutional:       Appearance: She is well-developed.   HENT:      Head: Normocephalic.   Eyes:      Conjunctiva/sclera: Conjunctivae normal.      Pupils: Pupils are equal, round, and reactive to light.   Neck:      Vascular: No JVD.   Cardiovascular:      Rate and Rhythm: Normal rate and regular rhythm.      Heart sounds: Normal heart sounds. No murmur heard.   No friction rub. No gallop.    Pulmonary:      Effort: No respiratory distress.      Breath sounds: No wheezing or rales.      Comments: Diminished breath sound, clear .  Chest:      Chest wall: No tenderness.   Abdominal:      General: Bowel sounds are normal. There is no distension.      Palpations: Abdomen is soft.      Tenderness: There is no abdominal " tenderness. There is no guarding or rebound.   Musculoskeletal:         General: No tenderness or deformity. Normal range of motion.      Cervical back: Neck supple.   Skin:     General: Skin is warm and dry.      Capillary Refill: Capillary refill takes 2 to 3 seconds.      Findings: No rash.   Neurological:      General: No focal deficit present.      Mental Status: She is alert and oriented to person, place, and time.      Cranial Nerves: No cranial nerve deficit.      Motor: Weakness present. No abnormal muscle tone.      Deep Tendon Reflexes: Reflexes normal.   Psychiatric:         Behavior: Behavior normal.         Thought Content: Thought content normal.     Discharge Disposition:  Home or Self Care    Discharge Medications:     Discharge Medications      New Medications      Instructions Start Date   famotidine 20 MG tablet  Commonly known as: PEPCID   20 mg, Oral, Daily   Start Date: August 21, 2021     nicotine 14 MG/24HR patch  Commonly known as: NICODERM CQ   1 patch, Transdermal, Every 24 Hours Scheduled, Take off it pt smoke.   Start Date: August 21, 2021        Continue These Medications      Instructions Start Date   albuterol sulfate  (90 Base) MCG/ACT inhaler  Commonly known as: PROVENTIL HFA;VENTOLIN HFA;PROAIR HFA   2 puffs, Inhalation, Every 4 Hours PRN      carvedilol 12.5 MG tablet  Commonly known as: COREG   12.5 mg, Oral, 2 Times Daily With Meals      cloNIDine 0.2 MG tablet  Commonly known as: CATAPRES   0.2 mg, Oral, Every 8 Hours Scheduled      DULoxetine 60 MG capsule  Commonly known as: CYMBALTA   60 mg, Oral, Daily      hydrALAZINE 50 MG tablet  Commonly known as: APRESOLINE   50 mg, Oral, 3 Times Daily      lactulose 10 GM/15ML solution  Commonly known as: CHRONULAC   20 g, Oral, 3 Times Daily      levothyroxine 25 MCG tablet  Commonly known as: SYNTHROID, LEVOTHROID   25 mcg, Oral, Daily      lisinopril 20 MG tablet  Commonly known as: PRINIVIL,ZESTRIL   20 mg, Oral, 2 times  daily      NIFEdipine XL 60 MG 24 hr tablet  Commonly known as: PROCARDIA XL   60 mg, Oral, 2 times daily      ondansetron 4 MG tablet  Commonly known as: ZOFRAN   4 mg, Oral, Every 8 Hours PRN      pregabalin 75 MG capsule  Commonly known as: LYRICA   75 mg, Oral, 2 Times Daily      rOPINIRole 4 MG tablet  Commonly known as: REQUIP   4 mg, Oral, Nightly      senna 8.6 MG tablet  Commonly known as: SENOKOT   1 tablet, Oral, Nightly      simvastatin 20 MG tablet  Commonly known as: ZOCOR   20 mg, Oral, Daily         Stop These Medications    cloNIDine 0.3 MG/24HR patch  Commonly known as: CATAPRES-TTS            Discharge Diet:   Diet Instructions     Advance Diet As Tolerated            Activity at Discharge:   Activity Instructions     Activity as Tolerated            Discharge Care Plan/Instructions: Discharge home with family.  Patient go home with home health, walker, wheelchair.    Follow-up Appointments:   Abdomen painFollow with PCP 1 week.  Follow with dialysis Saturday Tuesday and Thursday.    Electronically signed by Narinder Madrid MD, 08/20/21, 15:05 CDT.    Time: Greater than 30 minutes.        Electronically signed by Naridner Madrid MD at 08/20/21 4646

## 2021-08-21 NOTE — OUTREACH NOTE
Prep Survey      Responses   [facility] facility patient discharged from?  [place]   Is LACE score < 7 ?  No   Emergency Room discharge w/ pulse ox?  No   Eligibility  Readm Mgmt   Discharge diagnosis  Hyperkalemia   Does the patient have one of the following disease processes/diagnoses(primary or secondary)?  Other   Does the patient have Home health ordered?  No   Is there a DME ordered?  Yes   What DME was ordered?  RW and W/c per [facility]    Prep survey completed?  Yes          Samara Chung RN

## 2021-08-25 NOTE — OUTREACH NOTE
Medical Week 1 Survey      Responses   [facility] patient discharged from?  [place]   Does the patient have one of the following disease processes/diagnoses(primary or secondary)?  Other   Week 1 attempt successful?  No   Unsuccessful attempts  Attempt 1          Lexi Thao RN

## 2021-08-27 NOTE — OUTREACH NOTE
Medical Week 1 Survey      Responses   [facility] patient discharged from?  [place]   Does the patient have one of the following disease processes/diagnoses(primary or secondary)?  Other   Week 1 attempt successful?  Yes   Call start time  1308   Call end time  1312   Discharge diagnosis  Hyperkalemia   Meds reviewed with patient/caregiver?  Yes   Is the patient having any side effects they believe may be caused by any medication additions or changes?  No   Does the patient have all medications ordered at discharge?  Yes   Is the patient taking all medications as directed (includes completed medication regime)?  Yes   Does the patient have a primary care provider?   Yes   Does the patient have an appointment with their PCP within 7 days of discharge?  Yes   Has the patient kept scheduled appointments due by today?  Yes   Has home health visited the patient within 72 hours of discharge?  N/A   What DME was ordered?  RW and W/c per [facility]    Psychosocial issues?  No   Did the patient receive a copy of their discharge instructions?  Yes   Nursing interventions  Reviewed instructions with patient   What is the patient's perception of their health status since discharge?  Improving   Is the patient/caregiver able to teach back signs and symptoms related to disease process for when to call PCP?  Yes   Is the patient/caregiver able to teach back signs and symptoms related to disease process for when to call 911?  Yes   Is the patient/caregiver able to teach back the hierarchy of who to call/visit for symptoms/problems? PCP, Specialist, Home health nurse, Urgent Care, ED, 911  Yes   If the patient is a current smoker, are they able to teach back resources for cessation?  Smoking cessation medications   Additional teach back comments  Bs has been not bad she says, today high, but 135. She says she is dragging her left leg and surgery is Monday.  She says she has a bad tooth and will call surgeon to inquire.   Week 1  call completed?  Yes   Wrap up additional comments  Improving.          Linda Wolfe RN

## 2021-09-07 NOTE — OUTREACH NOTE
Medical Week 2 Survey      Responses   [facility] patient discharged from?  [place]   Does the patient have one of the following disease processes/diagnoses(primary or secondary)?  Other   Week 2 attempt successful?  Yes   Discharge diagnosis  Hyperkalemia   Call end time  1836   Is the patient taking all medications as directed (includes completed medication regime)?  Yes   Does the patient have a primary care provider?   Yes   Has the patient kept scheduled appointments due by today?  Yes   Has home health visited the patient within 72 hours of discharge?  N/A   What DME was ordered?  RW and W/c per [facility]    DME comments  States she also has a shower chair   Psychosocial issues?  No   What is the patient's perception of their health status since discharge?  New symptoms unrelated to diagnosis   Is the patient/caregiver able to teach back signs and symptoms related to disease process for when to call PCP?  Yes   Is the patient/caregiver able to teach back signs and symptoms related to disease process for when to call 911?  Yes   Is the patient/caregiver able to teach back the hierarchy of who to call/visit for symptoms/problems? PCP, Specialist, Home health nurse, Urgent Care, ED, 911  Yes   Additional teach back comments  States she has an infected tooth and trying to get in with a dentist.  States she is having a hard time finding someone who takes her insurance.  Her blood sugars have been in the 300s and taking insulin with sliding scale.  States she has spoke with her PCP and states a dentist needs to prescribe and antibiotic.   Week 2 Call Completed?  Yes   Wrap up additional comments  Pt to follow up with dentist due to infected tooth.  She is monitoring blood sugars closely          Monica King LPN   Negative

## 2021-09-11 PROBLEM — R77.8 TROPONIN LEVEL ELEVATED: Status: ACTIVE | Noted: 2021-01-01

## 2021-09-11 PROBLEM — R41.82 ALTERED MENTAL STATUS: Status: ACTIVE | Noted: 2021-01-01

## 2021-09-11 NOTE — THERAPY DISCHARGE NOTE
Acute Care - Speech Language Pathology   Swallow Initial Evaluation/Discharge [facility]     Patient Name: Ena Upton  : 1970  MRN: [id number]  Today's Date: 2021               Admit Date: 9/10/2021  ST clinical bedside swallow evaluation completed d/t failed swallow screen. Pt admitted for acute AMS. Hx chronic dialysis, DM, HTN. Pt oriented x4 and able to follow commands without difficulty. Full range of consistencies presented except mech soft. Timely swallow and adequate mastication of regular solids. No overt s/s of aspiration observed. ST cannot fully r/o silent aspiration. Pt okay to begin regular solids diet with thin liquids. Meds whole with thin liquids. RN to continue to monitor for any increased lung congsetion. General aspiration precautions with PO. No further speech services warranted at this time. MD to reconsult if change or new concern.   Corazon Alatorre, CCC-SLP 2021 08:24 CDT      Visit Dx:    ICD-10-CM ICD-9-CM   1. Altered mental status, unspecified altered mental status type  R41.82 780.97   2. Encephalopathy  G93.40 348.30   3. Uremia  N19 586   4. Hypoglycemia  E16.2 251.2   5. Metabolic acidosis  E87.2 276.2   6. Troponin level elevated  R77.8 790.6   7. Chronic narcotic use  F11.90 305.50   8. 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)   • Non-ketotic hyperosmolar coma (CMS/HCC)   • HTN (hypertension)   • Intractable nausea and vomiting   • Constipation   • Gastroparesis due to DM (CMS/HCC)   • Hyperglycemia   • Seizure (CMS/HCC)   • End stage kidney disease (CMS/HCC)   • Sacral insufficiency fracture   • Hypertensive urgency   • Uncontrolled diabetes mellitus (CMS/HCC)   • Hyperkalemia   • Altered mental status     Past Medical History:   Diagnosis Date   • Anxiety    • Arthritis    • Chronic kidney disease     STAGE V RENAL FAILURE   • Depression    • Diabetes  mellitus (CMS/HCC)    • HTN (hypertension)    • Hypothyroidism    • Obesity    • Tremors of nervous system      Past Surgical History:   Procedure Laterality Date   • ARTERIOVENOUS FISTULA     • BREAST BIOPSY     • CARPAL TUNNEL RELEASE     • CATH LAB PROCEDURE     • CHOLECYSTECTOMY     • TUBAL ABDOMINAL LIGATION         SLP Recommendation and Plan  SLP Swallowing Diagnosis: swallow WFL  SLP Diet Recommendation: regular textures, thin liquids     Monitor for Signs of Aspiration: yes, notify SLP if any concerns     Swallow Criteria for Skilled Therapeutic Interventions Met: no problems identified which require skilled intervention  Anticipated Discharge Disposition (SLP): home     Therapy Frequency (Swallow): evaluation only              Anticipated Discharge Disposition (SLP): home        Reason for Discharge: other (see comments) (eval only)    Plan of Care Reviewed With: patient  Progress: no change (eval)  Outcome Summary: ST clinical bedside swallow evaluation completed d/t failed swallow screen. Pt admitted for acute AMS. Hx chronic dialysis, DM, HTN. Pt oriented x4 and able to follow commands without difficulty. Full range of consistencies presented except mech soft. Timely swallow and adequate mastication of regular solids. No overt s/s of aspiration observed. ST cannot fully r/o silent aspiration. Pt okay to begin regular solids diet with thin liquids. Meds whole with thin liquids. RN to continue to monitor for any increased lung congsetion. General aspiration precautions with PO. No further speech services warranted at this time. MD to reconsult if change or new concern.       SWALLOW EVALUATION (last 72 hours)      SLP Adult Swallow Evaluation     Row Name 09/11/21 0735                   Rehab Evaluation    Document Type  evaluation  -BN        Subjective Information  complains of;pain  -BN        Patient Observations  alert;cooperative  -BN        Patient/Family/Caregiver Comments/Observations  no family  present  -BN        Care Plan Review  evaluation/treatment results reviewed  -BN        Care Plan Review, Other Participant(s)  caregiver  -BN        Patient Effort  good  -BN           General Information    Patient Profile Reviewed  yes  -BN        Pertinent History Of Current Problem  acute AMS. Hx chronic dialysis, DM, HTN  -BN        Current Method of Nutrition  NPO  -BN        Precautions/Limitations, Vision  WFL;for purposes of eval  -BN        Precautions/Limitations, Hearing  WFL;for purposes of eval  -BN        Prior Level of Function-Communication  WFL  -BN        Prior Level of Function-Swallowing  no diet consistency restrictions  -BN        Plans/Goals Discussed with  patient  -BN        Barriers to Rehab  none identified  -BN        Patient's Goals for Discharge  patient did not state  -BN           Pain    Additional Documentation  Pain Scale: Numbers Pre/Post-Treatment (Group)  -BN           Pain Scale: Numbers Pre/Post-Treatment    Pretreatment Pain Rating  7/10  -BN        Posttreatment Pain Rating  7/10  -BN        Pain Location  back  -BN        Pre/Posttreatment Pain Comment  RN notified  -BN           Oral Motor Structure and Function    Dentition Assessment  missing teeth;teeth are in poor condition  -BN        Secretion Management  WNL/WFL  -BN        Mucosal Quality  moist, healthy  -BN        Volitional Swallow  WFL  -BN           Oral Musculature and Cranial Nerve Assessment    Oral Motor General Assessment  WFL  -BN           General Eating/Swallowing Observations    Respiratory Support Currently in Use  room air  -BN        Eating/Swallowing Skills  fed by SLP;self-fed  -BN        Positioning During Eating  upright in bed  -BN        Utensils Used  spoon;straw  -BN        Consistencies Trialed  regular textures;pudding thick;honey-thick liquids;nectar/syrup-thick liquids;thin liquids  -BN           Clinical Swallow Eval    Oral Prep Phase  WFL  -BN        Oral Transit  WFL  -BN         Oral Residue  WFL  -BN        Pharyngeal Phase  no overt signs/symptoms of pharyngeal impairment  -BN        Esophageal Phase  unremarkable  -BN        Clinical Swallow Evaluation Summary  ST clinical bedside swallow evaluation completed d/t failed swallow screen. Pt admitted for acute AMS. Hx chronic dialysis, DM, HTN. Pt oriented x4 and able to follow commands without difficulty. Full range of consistencies presented except mech soft. Timely swallow and adequate mastication of regular solids. No overt s/s of aspiration observed. ST cannot fully r/o silent aspiration. Pt okay to begin regular solids diet with thin liquids. Meds whole with thin liquids. RN to continue to monitor for any increased lung congsetion. General aspiration precautions with PO. No further speech services warranted at this time. MD to reconsult if change or new concern.   -BN           Clinical Impression    SLP Swallowing Diagnosis  swallow WFL  -BN        Swallow Criteria for Skilled Therapeutic Interventions Met  no problems identified which require skilled intervention  -BN           Recommendations    Therapy Frequency (Swallow)  evaluation only  -BN        SLP Diet Recommendation  regular textures;thin liquids  -BN        Recommended Precautions and Strategies  upright posture during/after eating;small bites of food and sips of liquid;general aspiration precautions  -BN        Oral Care Recommendations  Oral Care BID/PRN  -BN        SLP Rec. for Method of Medication Administration  meds whole;with thin liquids  -BN        Monitor for Signs of Aspiration  yes;notify SLP if any concerns  -BN        Anticipated Discharge Disposition (SLP)  home  -BN          User Key  (r) = Recorded By, (t) = Taken By, (c) = Cosigned By    Initials Name Effective Dates    Corazon Russo CCC-SLP 06/16/21 -           EDUCATION  The patient has been educated in the following areas:   Dysphagia (Swallowing Impairment).                 Time  Calculation:   Time Calculation- SLP     Row Name 09/11/21 0823             Time Calculation- SLP    SLP Start Time  0735  -      SLP Stop Time  0823  -BN      SLP Time Calculation (min)  48 min  -BN      SLP Received On  09/11/21  -BN         Untimed Charges    SLP Eval/Re-eval   ST Eval Oral Pharyng Swallow - 47865  -BN      03041-XH Eval Oral Pharyng Swallow Minutes  48  -BN         Total Minutes    Untimed Charges Total Minutes  48  -BN       Total Minutes  48  -BN        User Key  (r) = Recorded By, (t) = Taken By, (c) = Cosigned By    Initials Name Provider Type    Corazon Russo CCC-SLP Speech and Language Pathologist          Therapy Charges for Today     Code Description Service Date Service Provider Modifiers Qty    03324503050  ST EVAL ORAL PHARYNG SWALLOW 3 9/11/2021 Corazon Alatorre CCC-SLP GN 1               SLP Discharge Summary  Anticipated Discharge Disposition (SLP): home  Reason for Discharge: other (see comments) (eval only)  Progress Toward Achieving Short/long Term Goals: other (see comments) (eval only)  Discharge Destination: other (see comments) (still in acute care)    NAZANIN Hensley  9/11/2021

## 2021-09-11 NOTE — ED NOTES
Spoke to dr li on the phone regarding patient's hb going from 8.2 to 7.3 overnight. Dr li does not want to give any blood until dialysis is done today. Informed dr li that potassium is 2.9, verbal order of 20meq of potassium put in. Magnesium lab verbal order placed. Dr li informed that patient has been asking for pain medication and that patient is NPO. No further orders for patient.     Nishi Lyles, RN  09/11/21 2867

## 2021-09-11 NOTE — CONSULTS
Patient Care Team:  Yaron Wiggins APRN as PCP - General  Yaron Wiggins APRN as PCP - Family Medicine  Seth Noriega DO  REASON FOR REFERRAL: Elevated troponin  Chief complaint : Altered mental status, weakness    Subjective     Patient is a 51 y.o. female presents to the emergency department with altered mental status.  Reportedly, family member found her at home with decreased responsiveness.  EMS was called and glucose reading would not register on glucometer.  She was treated with D50, and later started on a dextrose infusion in the ER.  She also received Narcan.  She then became alert and oriented.  She was not complaining of chest pain or shortness of breath.  She reportedly gave herself an unknown amount of insulin at home.  The patient tells me she does take insulin at home, although this is not listed on her medication list.  She is a type II diabetic.    The patient tells me she has had chest pain for a few days but she describes this as a mild discomfort with deep breathing that would last a few seconds at a time.  She also tells me she remembers having some brief shortness of breath earlier today that went away after a few minutes.  Overall, she is a poor historian.  She denies palpitations, edema, orthopnea, PND.  She does have ESRD on hemodialysis 3 times per week-Tuesday, Thursday, Saturday.  She states she did complete her most recent dialysis 2 days ago on Thursday.  She tells me she is compliant with her medications.  She does smoke cigarettes.    Aside from hypoglycemia, work-up in the ER also revealed hypokalemia with potassium of 2.3, followed by 2.9.  Troponins were checked and were flat trending-0.169 at the highest.  Chest x-ray and CT of the head were unremarkable.  She received potassium replacement with 40 MEQ total; recheck pending.  She has also received carvedilol, D50, dextrose infusion, glucagon, and Narcan as noted above.  EKGs reveal sinus rhythm, prolonged  QTC-up to 583, T wave inversions in the lateral leads as well as V4, with ST depression in 2, aVF, V4, V5, V6--improved on repeat.  June 2021 EKGs also revealed lateral T wave inversions.    She previously underwent cardiac catheterization in March 2020 at [facility].  Review of records revealed she was admitted over there with volume overload having missed dialysis, hypertensive urgency, and had mildly elevated but trending troponins.  Cath revealed nonobstructive coronary artery disease and a normal LVEF.  2D echo on March 2019 at [facility] revealed a normal LVEF as well-see reports below.    Of note, she also tells me she has been having hemoptysis for the past few weeks.  Additionally, she states earlier today she noticed her hand  is very weak on the left.    6/26/2020 cath report:     Procedure     Procedure Type      Diagnostic procedure:DCA, Coronary Angiogram, Left Heart Cath, Left    Ventriculogram, Left Ventricular Pressure Measurement      Peripheral Cath Diagnostic Procedure:RENAL ARTERY INJECTION,BILATER      CARDIAC CATHETERIZATION      Conclusions      Peripheral Procedure Description      Nonobstructive coronary artery disease.    Normal LV systolic function.    Subocclusive left renal artery distally with atretic right renal    vasculature and no dye retention in renal pelvises consistent with    nonfunctional kidneys.      Recommendations      Medical management.      Signatures      ----------------------------------------------------------------    Electronically signed by Miguel Pope MD(Performing Physician) on    06/26/2020 14:31    ----------------------------------------------------------------      3/28/2019 echo report:    Conclusions      Summary    Mitral valve leaflets are mildly thickened with preserved leaflet    mobility.    Trace mitral regurgitation.    Aortic valve leaflets are moderately thickened.    Aortic valve appears to be tricuspid.    Moderate aortic stenosis.    The aortic valve  area is 1.61 cm2 with a maximum gradient of 38 mmHg and a    mean gradient of 21 mmHg.    Mild tricuspid regurgitation.    Severely dilated left atrium.    Normal left ventricular size and function.    Mild concentric left ventricular hypertrophy.    Left ventricular ejection fraction is estimated at 57%.    Markedly enlarged right atrium size.    Moderately enlarged right ventricle cavity.    IVC dilated.      Signature      ----------------------------------------------------------------    Electronically signed by Bernardino Olivo MD(Interpreting    physician) on 03/28/2019 04:17 PM         Review of Systems   Review of Systems   Constitutional: Positive for fatigue. Negative for diaphoresis, fever and unexpected weight change.   HENT: Negative for nosebleeds.    Respiratory: Positive for shortness of breath (transient, now resolved ). Negative for apnea, cough, chest tightness and wheezing.         Hemoptysis   Cardiovascular: Positive for chest pain (atypical, with deep breathing x several days ). Negative for palpitations and leg swelling.   Gastrointestinal: Negative for abdominal distention, nausea and vomiting.   Genitourinary: Negative for hematuria.   Musculoskeletal: Negative for gait problem.   Skin: Negative for color change.   Neurological: Positive for weakness. Negative for dizziness, syncope and light-headedness.   Psychiatric/Behavioral: Positive for confusion.       History  Past Medical History:   Diagnosis Date   • Anxiety    • Arthritis    • Chronic kidney disease     STAGE V RENAL FAILURE   • Depression    • Diabetes mellitus (CMS/HCC)    • HTN (hypertension)    • Hypothyroidism    • Obesity    • Tremors of nervous system      Past Surgical History:   Procedure Laterality Date   • ARTERIOVENOUS FISTULA     • BREAST BIOPSY     • CARPAL TUNNEL RELEASE     • CATH LAB PROCEDURE     • CHOLECYSTECTOMY     • TUBAL ABDOMINAL LIGATION       Family History   Problem Relation Age of Onset   • Cancer Other     • Hypertension Other    • Kidney disease Other    • Heart disease Other    • Diabetes Other    • Diabetes Mother    • Diabetes Maternal Aunt    • Diabetes Maternal Grandmother      Social History     Tobacco Use   • Smoking status: Current Every Day Smoker     Packs/day: 0.50   • Smokeless tobacco: Never Used   • Tobacco comment: CUTTING BACK AND TRYING TO STOP SMOKING   Substance Use Topics   • Alcohol use: No   • Drug use: No     (Not in a hospital admission)      Current Facility-Administered Medications:   •  acetaminophen (TYLENOL) tablet 650 mg, 650 mg, Oral, Q4H PRN, Seth Noriega DO  •  atorvastatin (LIPITOR) tablet 10 mg, 10 mg, Oral, Daily, Seth Noriega DO, 10 mg at 09/11/21 0843  •  carvedilol (COREG) tablet 12.5 mg, 12.5 mg, Oral, BID With Meals, Seth Noriega DO, 12.5 mg at 09/11/21 1113  •  dextrose 10 % infusion, 100 mL/hr, Intravenous, Continuous, Naun Fields MD, Stopped at 09/11/21 1124  •  DULoxetine (CYMBALTA) DR capsule 60 mg, 60 mg, Oral, Daily, Seth Noriega DO, 60 mg at 09/11/21 0843  •  famotidine (PEPCID) tablet 20 mg, 20 mg, Oral, Daily, Seth Noriega DO, 20 mg at 09/11/21 0843  •  lactulose solution 20 g, 20 g, Oral, TID, Seth Noriega DO  •  levothyroxine (SYNTHROID, LEVOTHROID) tablet 25 mcg, 25 mcg, Oral, Q AM, Seth Noriega DO, 25 mcg at 09/11/21 0844  •  nicotine (NICODERM CQ) 14 MG/24HR patch 1 patch, 1 patch, Transdermal, Q24H, Seth Noriega DO, 1 patch at 09/11/21 0843  •  ondansetron (ZOFRAN) injection 4 mg, 4 mg, Intravenous, Q6H PRN, Seth Noriega DO  •  pregabalin (LYRICA) capsule 75 mg, 75 mg, Oral, Q12H, Seth Noriega DO, 75 mg at 09/11/21 1112  •  rOPINIRole (REQUIP) tablet 4 mg, 4 mg, Oral, Nightly, Seth Noriega DO  •  sodium chloride 0.9 % flush 10 mL, 10 mL, Intravenous, PRN, Naun Fields MD  •  [COMPLETED] Insert peripheral IV, , , Once **AND** sodium chloride 0.9 % flush 10 mL, 10 mL,  Intravenous, PRN, Naun Fields MD  •  sodium chloride 0.9 % flush 10 mL, 10 mL, Intravenous, Q12H, Seth Noriega DO  •  sodium chloride 0.9 % flush 10 mL, 10 mL, Intravenous, PRN, Seth Noriega DO    Current Outpatient Medications:   •  albuterol (PROVENTIL HFA;VENTOLIN HFA) 108 (90 BASE) MCG/ACT inhaler, Inhale 2 puffs Every 4 (Four) Hours As Needed for wheezing., Disp: , Rfl:   •  carvedilol (COREG) 12.5 MG tablet, Take 1 tablet by mouth 2 (Two) Times a Day With Meals., Disp: , Rfl:   •  cloNIDine (CATAPRES) 0.2 MG tablet, Take 1 tablet by mouth Every 8 (Eight) Hours., Disp: , Rfl:   •  DULoxetine (CYMBALTA) 60 MG capsule, Take 60 mg by mouth daily., Disp: , Rfl:   •  famotidine (PEPCID) 20 MG tablet, Take 1 tablet by mouth Daily., Disp: 90 tablet, Rfl: 0  •  hydrALAZINE (APRESOLINE) 50 MG tablet, Take 50 mg by mouth 3 (Three) Times a Day., Disp: , Rfl:   •  lactulose (CHRONULAC) 10 GM/15ML solution, Take 20 g by mouth 3 (Three) Times a Day., Disp: , Rfl:   •  levothyroxine (SYNTHROID, LEVOTHROID) 25 MCG tablet, Take 25 mcg by mouth Daily., Disp: , Rfl:   •  lisinopril (PRINIVIL,ZESTRIL) 20 MG tablet, Take 20 mg by mouth 2 (two) times a day., Disp: , Rfl:   •  nicotine (NICODERM CQ) 14 MG/24HR patch, Place 1 patch on the skin as directed by provider Daily. Take off it pt smoke., Disp: 30 patch, Rfl: 2  •  NIFEdipine XL (PROCARDIA XL) 60 MG 24 hr tablet, Take 60 mg by mouth 2 (two) times a day., Disp: , Rfl:   •  ondansetron (ZOFRAN) 4 MG tablet, Take 4 mg by mouth Every 8 (Eight) Hours As Needed for nausea or vomiting., Disp: , Rfl:   •  pregabalin (LYRICA) 75 MG capsule, Take 75 mg by mouth 2 (Two) Times a Day., Disp: , Rfl:   •  rOPINIRole (REQUIP) 4 MG tablet, Take 4 mg by mouth Every Night., Disp: , Rfl:   •  senna (SENOKOT) 8.6 MG tablet tablet, Take 1 tablet by mouth Every Night., Disp: , Rfl:   •  simvastatin (ZOCOR) 20 MG tablet, Take 20 mg by mouth Daily., Disp: , Rfl:   Allergies:   Penicillins, Lamisil [terbinafine], Reglan [metoclopramide], and Stadol [butorphanol]    Objective     Vital Signs  Temp:  [97.9 °F (36.6 °C)] 97.9 °F (36.6 °C)  Heart Rate:  [55-67] 64  Resp:  [18] 18  BP: ()/(54-80) 178/79    Physical Exam:   Vitals and nursing note reviewed.   Constitutional:       General: Not in acute distress.     Appearance: Well-developed and not in distress. Chronically ill-appearing. Not diaphoretic.   Neck:      Vascular: No JVD.   Pulmonary:      Effort: Pulmonary effort is normal. No respiratory distress.      Breath sounds: Normal breath sounds.   Cardiovascular:      Normal rate. Regular rhythm.      Murmurs: There is a systolic murmur.   Edema:     Peripheral edema absent.   Abdominal:      Tenderness: There is no abdominal tenderness.   Skin:     General: Skin is warm and dry.   Neurological:      Mental Status: Alert and oriented to person, place, and time.       Results Review:     Lab Results (last 72 hours)     Procedure Component Value Units Date/Time    POC Glucose Once [[id number]]  (Abnormal) Collected: 09/11/21 1111    Specimen: Blood Updated: 09/11/21 1139     Glucose 242 mg/dL      Comment: : [id number] Nancie MaciasLessons OnlyMeter ID: [id number]       POC Glucose Once [[id number]]  (Abnormal) Collected: 09/11/21 0808    Specimen: Blood Updated: 09/11/21 0835     Glucose 149 mg/dL      Comment: : [id number] Nancie The Nest CollectiveMeter ID: [id number]       Troponin [[id number]]  (Abnormal) Collected: 09/11/21 0655    Specimen: Blood Updated: 09/11/21 0732     Troponin T 0.147 ng/mL     Narrative:      Troponin T Reference Range:  <= 0.03 ng/mL-   Negative for AMI  >0.03 ng/mL-     Abnormal for myocardial necrosis.  Clinicians would have to utilize clinical acumen, EKG, Troponin and serial changes to determine if it is an Acute Myocardial Infarction or myocardial injury due to an underlying chronic condition.       Results may be falsely decreased if patient taking Biotin.       Comprehensive Metabolic Panel [[id number]]  (Abnormal) Collected: 09/11/21 0511    Specimen: Blood Updated: 09/11/21 0556     Glucose 122 mg/dL      BUN 37 mg/dL      Creatinine 3.91 mg/dL      Sodium 138 mmol/L      Potassium 2.9 mmol/L      Chloride 96 mmol/L      CO2 27.0 mmol/L      Calcium 8.5 mg/dL      Total Protein 6.1 g/dL      Albumin 3.10 g/dL      ALT (SGPT) 7 U/L      AST (SGOT) 14 U/L      Alkaline Phosphatase 108 U/L      Total Bilirubin 0.2 mg/dL      eGFR Non African Amer 12 mL/min/1.73      Comment: <15 Indicative of kidney failure.        eGFR   Amer --     Comment: <15 Indicative of kidney failure.        Globulin 3.0 gm/dL      A/G Ratio 1.0 g/dL      BUN/Creatinine Ratio 9.5     Anion Gap 15.0 mmol/L     Narrative:      GFR Normal >60  Chronic Kidney Disease <60  Kidney Failure <15      Troponin [[id number]]  (Abnormal) Collected: 09/11/21 0511    Specimen: Blood Updated: 09/11/21 0545     Troponin T 0.146 ng/mL     Narrative:      Troponin T Reference Range:  <= 0.03 ng/mL-   Negative for AMI  >0.03 ng/mL-     Abnormal for myocardial necrosis.  Clinicians would have to utilize clinical acumen, EKG, Troponin and serial changes to determine if it is an Acute Myocardial Infarction or myocardial injury due to an underlying chronic condition.       Results may be falsely decreased if patient taking Biotin.      Phosphorus [[id number]]  (Abnormal) Collected: 09/11/21 0511    Specimen: Blood Updated: 09/11/21 0544     Phosphorus 7.2 mg/dL     CBC Auto Differential [[id number]]  (Abnormal) Collected: 09/11/21 0511    Specimen: Blood Updated: 09/11/21 0519     WBC 8.95 10*3/mm3      RBC 2.44 10*6/mm3      Hemoglobin 7.3 g/dL      Hematocrit 22.7 %      MCV 93.0 fL      MCH 29.9 pg      MCHC 32.2 g/dL      RDW 16.0 %      RDW-SD 54.1 fl      MPV 9.6 fL      Platelets 170 10*3/mm3      Neutrophil % 78.0 %      Lymphocyte % 11.2 %      Monocyte % 8.3 %      Eosinophil % 1.6 %      Basophil % 0.7 %       Immature Grans % 0.2 %      Neutrophils, Absolute 6.99 10*3/mm3      Lymphocytes, Absolute 1.00 10*3/mm3      Monocytes, Absolute 0.74 10*3/mm3      Eosinophils, Absolute 0.14 10*3/mm3      Basophils, Absolute 0.06 10*3/mm3      Immature Grans, Absolute 0.02 10*3/mm3      nRBC 0.0 /100 WBC     POC Glucose Once [[id number]]  (Abnormal) Collected: 09/11/21 0343    Specimen: Blood Updated: 09/11/21 0354     Glucose 140 mg/dL      Comment: : [id number] Trupti AlmodovarMeter ID: [id number]       Troponin [[id number]]  (Abnormal) Collected: 09/11/21 0218    Specimen: Blood Updated: 09/11/21 0309     Troponin T 0.149 ng/mL     Narrative:      Troponin T Reference Range:  <= 0.03 ng/mL-   Negative for AMI  >0.03 ng/mL-     Abnormal for myocardial necrosis.  Clinicians would have to utilize clinical acumen, EKG, Troponin and serial changes to determine if it is an Acute Myocardial Infarction or myocardial injury due to an underlying chronic condition.       Results may be falsely decreased if patient taking Biotin.      POC Glucose Once [[id number]]  (Abnormal) Collected: 09/11/21 0234    Specimen: Blood Updated: 09/11/21 0245     Glucose 176 mg/dL      Comment: : [id number] Jesse DaleMeter ID: [id number]       Orange Draw [[id number]] Collected: 09/11/21 0033    Specimen: Blood Updated: 09/11/21 0145    Narrative:      The following orders were created for panel order Orange Draw.  Procedure                               Abnormality         Status                     ---------                               -----------         ------                     Green Top (Gel)[[id number]]                                  Final result               Lavender Top[[id number]]                                     Final result               Red Top[[id number]]                                          Final result               Light Blue Top[[id number]]                                   Final result                 Please view  results for these tests on the individual orders.    Green Top (Gel) [[id number]] Collected: 09/11/21 0033    Specimen: Blood Updated: 09/11/21 0145     Extra Tube Hold for add-ons.     Comment: Auto resulted.       Red Top [[id number]] Collected: 09/11/21 0033    Specimen: Blood Updated: 09/11/21 0145     Extra Tube Hold for add-ons.     Comment: Auto resulted.       Lavender Top [[id number]] Collected: 09/11/21 0033    Specimen: Blood Updated: 09/11/21 0145     Extra Tube hold for add-on     Comment: Auto resulted       Light Blue Top [[id number]] Collected: 09/11/21 0036    Specimen: Blood Updated: 09/11/21 0145     Extra Tube hold for add-on     Comment: Auto resulted       Urine Drug Screen - Urine, Catheter [[id number]]  (Normal) Collected: 09/11/21 0108    Specimen: Urine, Catheter Updated: 09/11/21 0138     THC, Screen, Urine Negative     Phencyclidine (PCP), Urine Negative     Cocaine Screen, Urine Negative     Methamphetamine, Ur Negative     Opiate Screen Negative     Amphetamine Screen, Urine Negative     Benzodiazepine Screen, Urine Negative     Tricyclic Antidepressants Screen Negative     Methadone Screen, Urine Negative     Barbiturates Screen, Urine Negative     Oxycodone Screen, Urine Negative     Propoxyphene Screen Negative     Buprenorphine, Screen, Urine Negative    Narrative:      Cutoff For Drugs Screened:    Amphetamines               500 ng/ml  Barbiturates               200 ng/ml  Benzodiazepines            150 ng/ml  Cocaine                    150 ng/ml  Methadone                  200 ng/ml  Opiates                    100 ng/ml  Phencyclidine               25 ng/ml  THC                            50 ng/ml  Methamphetamine            500 ng/ml  Tricyclic Antidepressants  300 ng/ml  Oxycodone                  100 ng/ml  Propoxyphene               300 ng/ml  Buprenorphine               10 ng/ml    The normal value for all drugs tested is negative. This report includes unconfirmed screening  results, with the cutoff values listed, to be used for medical treatment purposes only.  Unconfirmed results must not be used for non-medical purposes such as employment or legal testing.  Clinical consideration should be applied to any drug of abuse test, particularly when unconfirmed results are used.      Troponin [[id number]]  (Abnormal) Collected: 09/11/21 0033    Specimen: Blood Updated: 09/11/21 0134     Troponin T 0.169 ng/mL     Narrative:      Troponin T Reference Range:  <= 0.03 ng/mL-   Negative for AMI  >0.03 ng/mL-     Abnormal for myocardial necrosis.  Clinicians would have to utilize clinical acumen, EKG, Troponin and serial changes to determine if it is an Acute Myocardial Infarction or myocardial injury due to an underlying chronic condition.       Results may be falsely decreased if patient taking Biotin.      Comprehensive Metabolic Panel [[id number]]  (Abnormal) Collected: 09/11/21 0033    Specimen: Blood Updated: 09/11/21 0133     Glucose 40 mg/dL      BUN 34 mg/dL      Creatinine 3.98 mg/dL      Sodium 141 mmol/L      Potassium 2.3 mmol/L      Chloride 96 mmol/L      CO2 27.0 mmol/L      Calcium 9.2 mg/dL      Total Protein 7.1 g/dL      Albumin 3.80 g/dL      ALT (SGPT) 9 U/L      AST (SGOT) 15 U/L      Alkaline Phosphatase 125 U/L      Total Bilirubin 0.3 mg/dL      eGFR Non African Amer 12 mL/min/1.73      Comment: <15 Indicative of kidney failure.        eGFR   Amer --     Comment: <15 Indicative of kidney failure.        Globulin 3.3 gm/dL      A/G Ratio 1.2 g/dL      BUN/Creatinine Ratio 8.5     Anion Gap 18.0 mmol/L     Narrative:      GFR Normal >60  Chronic Kidney Disease <60  Kidney Failure <15      Urinalysis, Microscopic Only - Urine, Catheter In/Out [[id number]]  (Abnormal) Collected: 09/11/21 0108    Specimen: Urine, Catheter In/Out Updated: 09/11/21 0131     RBC, UA 3-5 /HPF      WBC, UA 0-2 /HPF      Bacteria, UA None Seen /HPF      Squamous Epithelial Cells, UA 0-2 /HPF       Hyaline Casts, UA 0-2 /LPF      Methodology Automated Microscopy    Urinalysis With Culture If Indicated - Urine, Catheter In/Out [[id number]]  (Abnormal) Collected: 09/11/21 0108    Specimen: Urine, Catheter In/Out Updated: 09/11/21 0131     Color, UA Yellow     Appearance, UA Clear     pH, UA 7.0     Specific Gravity, UA 1.017     Glucose, UA >=1000 mg/dL (3+)     Ketones, UA Negative     Bilirubin, UA Negative     Blood, UA Negative     Protein,  mg/dL (2+)     Leuk Esterase, UA Negative     Nitrite, UA Negative     Urobilinogen, UA 0.2 E.U./dL    COVID-19,[facility] IN-HOUSE,Nasal Swab No Transport Media 3-4 HR TAT - Swab, Nasal Cavity [[id number]]  (Normal) Collected: 09/11/21 0040    Specimen: Swab from Nasal Cavity Updated: 09/11/21 0129     COVID19 Not Detected    Narrative:      Fact sheet for providers: https://www.fda.gov/media/129336/download     Fact sheet for patients: https://www.fda.gov/media/948332/download    Test performed by PCR.    Consider negative results in combination with clinical observations, patient history, and epidemiological information.    POC Glucose Once [[id number]]  (Abnormal) Collected: 09/11/21 0116    Specimen: Blood Updated: 09/11/21 0127     Glucose 150 mg/dL      Comment: : [id number] SquareHookbree KaitlinMeter ID: [id number]       D-dimer, Quantitative [[id number]]  (Abnormal) Collected: 09/11/21 0036    Specimen: Blood Updated: 09/11/21 0120     D-Dimer, Quantitative 1.22 mg/L (FEU)     Narrative:      Reference Range is 0-0.50 mg/L FEU. However, results <0.50 mg/L FEU tends to rule out DVT or PE. Results >0.50 mg/L FEU are not useful in predicting absence or presence of DVT or PE.      aPTT [[id number]]  (Normal) Collected: 09/11/21 0036    Specimen: Blood Updated: 09/11/21 0120     PTT 30.9 seconds     Protime-INR [[id number]]  (Normal) Collected: 09/11/21 0036    Specimen: Blood Updated: 09/11/21 0120     Protime 13.2 Seconds      INR 1.04    Blood Gas, Venous -  [[id number]]  (Abnormal) Collected: 09/11/21 0108    Specimen: Venous Blood Updated: 09/11/21 0117     Site OTHER     pH, Venous 7.398 pH Units      pCO2, Venous 50.3 mm Hg      pO2, Venous 49.0 mm Hg      HCO3, Venous 31.0 mmol/L      Comment: 83 Value above reference range        Base Excess, Venous 5.4 mmol/L      Comment: 83 Value above reference range        O2 Saturation, Venous 83.3 %      Comment: 83 Value above reference range        Temperature 37.0 C      Barometric Pressure for Blood Gas 752 mmHg      Modality Room Air     Ventilator Mode NA     Collected by [id number]     Comment: Meter: [id number]     :  [id number]       Ammonia [[id number]]  (Normal) Collected: 09/11/21 0033    Specimen: Blood Updated: 09/11/21 0116     Ammonia 13 umol/L     Ethanol [[id number]] Collected: 09/11/21 0033    Specimen: Blood Updated: 09/11/21 0115     Ethanol % <0.010 %     Narrative:      Not for legal purposes. Chain of Custody not followed.     Magnesium [[id number]]  (Normal) Collected: 09/11/21 0033    Specimen: Blood Updated: 09/11/21 0114     Magnesium 2.4 mg/dL     POC Glucose Once [[id number]]  (Abnormal) Collected: 09/11/21 0056    Specimen: Blood Updated: 09/11/21 0107     Glucose 41 mg/dL      Comment: : [id number] Adrianlito JamiMeter ID: [id number]       CBC & Differential [[id number]]  (Abnormal) Collected: 09/11/21 0033    Specimen: Blood Updated: 09/11/21 0049    Narrative:      The following orders were created for panel order CBC & Differential.  Procedure                               Abnormality         Status                     ---------                               -----------         ------                     CBC Auto Differential[[id number]]        Abnormal            Final result                 Please view results for these tests on the individual orders.    CBC Auto Differential [[id number]]  (Abnormal) Collected: 09/11/21 0033    Specimen: Blood Updated: 09/11/21 0049     WBC 8.75  10*3/mm3      RBC 2.79 10*6/mm3      Hemoglobin 8.2 g/dL      Hematocrit 25.8 %      MCV 92.5 fL      MCH 29.4 pg      MCHC 31.8 g/dL      RDW 16.1 %      RDW-SD 54.0 fl      MPV 9.7 fL      Platelets 217 10*3/mm3      Neutrophil % 68.8 %      Lymphocyte % 15.3 %      Monocyte % 11.4 %      Eosinophil % 3.2 %      Basophil % 1.1 %      Immature Grans % 0.2 %      Neutrophils, Absolute 6.01 10*3/mm3      Lymphocytes, Absolute 1.34 10*3/mm3      Monocytes, Absolute 1.00 10*3/mm3      Eosinophils, Absolute 0.28 10*3/mm3      Basophils, Absolute 0.10 10*3/mm3      Immature Grans, Absolute 0.02 10*3/mm3      nRBC 0.0 /100 WBC     POC Glucose Once [[id number]]  (Normal) Collected: 09/11/21 0003    Specimen: Blood Updated: 09/11/21 0014     Glucose 81 mg/dL      Comment: : [id number] [facility] ID: [id number]             Assessment/Plan     1.  Altered mental status: Resolved after treatment of hypoglycemia and administration of Narcan.  She is somewhat lethargic, but she awakens easily and answers questions appropriately now.    2.  Hypoglycemia: Resolved.  She was treated with D50, glucagon, dextrose infusion    3.  ESRD on dialysis 3 times per week: She states she goes Tuesday, Thursday, Saturday and has not missed recent dialysis sessions.    4.  Hypokalemia: Initially 2.3, followed by 2.9-replaced with a total of 40 MEQ; treatment per admitting team    5.  Myocardial injury: Patient has a chronically elevated, flat trending troponins in the setting of ESRD (highest reading 0.169 this admission).  No evidence of ACS.  She initially denied any chest pain, but during my exam she reports she has had a few days of chest discomfort with deep breathing.  At this point I am not concerned for angina. She does have EKG abnormalities-including prolonged QTC and inferolateral ST-T wave changes.  However, this is in the setting of hypokalemia and hypoglycemia and again her troponin trend and symptoms are not consistent  with ACS.  Therefore, I would not recommend any further cardiac testing at this time.  Continue to monitor telemetry given her prolonged QTC.  Additionally, the patient underwent cardiac catheterization approximately 1 year ago in June 2020-this revealed nonobstructive coronary artery disease.  It is unclear to me what the exact circumstances were at the time of the catheterization-notes indicate she was admitted with volume overload, hypertensive urgency, and also had a mildly elevated flat trending troponin at that time.    6.  Diabetes mellitus type 2: She tells me she takes insulin at home, although this is not listed on her medication list.  She presented with hypoglycemia this admission after receiving an unknown amount of insulin at home.    7.  Essential hypertension: On Coreg, clonidine, hydralazine, lisinopril, nifedipine outpatient.  Stable.    8.  Tobacco abuse    9.  Chronic anemia in the setting of ESRD-stable.  However, she also tells me she has been having intermittent hemoptysis for the past few weeks.    Cardiology will sign off, recall as needed.    I discussed the patient's findings and my recommendations with patient.     Electronically signed by GAEL Simmons, 09/11/21, 11:55 AM CDT.

## 2021-09-11 NOTE — ED PROVIDER NOTES
Subjective   Patient was brought to the ER with altered mental status as per the EMS she had taken uknown  amount of insulin and her blood sugar was very low initially.  This was not a suicidal attempt e blood sugar monitor was not registering the blood sugar level and therefore she was given some glucose.  She was brought to the ER for evaluation the patient appears confused and lethargic at this time.  She is on continuous pain medications.  Does not any pain pump.  Work-up is going performed on her.  Pulmonary use Narcan on her      History provided by:  EMS personnel  History limited by:  Mental status change  Altered Mental Status  Presenting symptoms: confusion, lethargy and partial responsiveness    Severity:  Moderate  Most recent episode:  Today  Episode history:  Single  Timing:  Constant  Progression:  Unchanged  Chronicity:  New  Context: not taking medications as prescribed    Context: not alcohol use, not dementia, not drug use, not head injury, not homeless, not nursing home resident, not recent change in medication, not recent illness and not recent infection    Associated symptoms: nausea and weakness    Associated symptoms: no abdominal pain, normal movement, no agitation, no decreased appetite, no depression, no difficulty breathing, no fever, no hallucinations, no seizures, no slurred speech, no suicidal behavior and no visual change    Hypoglycemia  Associated symptoms: altered mental status and weakness    Associated symptoms: no seizures and no visual change        Review of Systems   Unable to perform ROS: Mental status change   Constitutional: Negative for decreased appetite and fever.   Gastrointestinal: Positive for nausea. Negative for abdominal pain.   Neurological: Positive for weakness. Negative for seizures.   Psychiatric/Behavioral: Positive for confusion. Negative for agitation and hallucinations.       Past Medical History:   Diagnosis Date   • Anxiety    • Arthritis    • Chronic  kidney disease     STAGE V RENAL FAILURE   • Depression    • Diabetes mellitus (CMS/HCC)    • HTN (hypertension)    • Hypothyroidism    • Obesity    • Tremors of nervous system        Allergies   Allergen Reactions   • Penicillins Hives and Shortness Of Breath   • Lamisil [Terbinafine]    • Reglan [Metoclopramide] GI Intolerance   • Stadol [Butorphanol] Nausea And Vomiting       Past Surgical History:   Procedure Laterality Date   • ARTERIOVENOUS FISTULA     • BREAST BIOPSY     • CARPAL TUNNEL RELEASE     • CHOLECYSTECTOMY     • TUBAL ABDOMINAL LIGATION         Family History   Problem Relation Age of Onset   • Cancer Other    • Hypertension Other    • Kidney disease Other    • Heart disease Other    • Diabetes Other    • Diabetes Mother    • Diabetes Maternal Aunt    • Diabetes Maternal Grandmother        Social History     Socioeconomic History   • Marital status:      Spouse name: Not on file   • Number of children: Not on file   • Years of education: Not on file   • Highest education level: Not on file   Tobacco Use   • Smoking status: Current Every Day Smoker     Packs/day: 0.50   • Smokeless tobacco: Never Used   • Tobacco comment: CUTTING BACK AND TRYING TO STOP SMOKING   Substance and Sexual Activity   • Alcohol use: No   • Drug use: No   • Sexual activity: Defer           Objective   Physical Exam  Vitals and nursing note reviewed. Exam conducted with a chaperone present.   Constitutional:       General: She is not in acute distress.     Appearance: She is overweight. She is ill-appearing. She is not diaphoretic.   HENT:      Head: Normocephalic and atraumatic.      Nose: Nose normal.      Mouth/Throat:      Mouth: Mucous membranes are moist.      Pharynx: Oropharynx is clear.   Eyes:      General: No visual field deficit or scleral icterus.     Extraocular Movements: Extraocular movements intact.      Conjunctiva/sclera: Conjunctivae normal.      Pupils: Pupils are equal, round, and reactive to  light.   Neck:      Vascular: No carotid bruit.   Cardiovascular:      Rate and Rhythm: Normal rate and regular rhythm.      Pulses: Normal pulses.      Heart sounds: No murmur heard.   No friction rub.   Pulmonary:      Effort: Pulmonary effort is normal. No respiratory distress.      Breath sounds: No stridor. Decreased breath sounds present. No wheezing.   Abdominal:      General: Abdomen is flat. Bowel sounds are normal. There is no distension.      Palpations: There is no mass.      Tenderness: There is no abdominal tenderness.   Musculoskeletal:         General: No swelling or tenderness. Normal range of motion.      Cervical back: Normal range of motion and neck supple. No rigidity. No muscular tenderness.   Lymphadenopathy:      Cervical: No cervical adenopathy.   Skin:     General: Skin is warm.      Capillary Refill: Capillary refill takes less than 2 seconds.      Coloration: Skin is not jaundiced or pale.      Findings: No bruising or rash.   Neurological:      General: No focal deficit present.      Mental Status: She is lethargic, disoriented and confused.      GCS: GCS eye subscore is 4. GCS verbal subscore is 5. GCS motor subscore is 5.      Cranial Nerves: Cranial nerves are intact. No cranial nerve deficit, dysarthria or facial asymmetry.      Sensory: Sensation is intact.      Motor: Motor function is intact. No weakness, abnormal muscle tone, seizure activity or pronator drift.      Deep Tendon Reflexes: Reflexes are normal and symmetric. Babinski sign absent on the right side. Babinski sign absent on the left side.      Reflex Scores:       Bicep reflexes are 2+ on the right side and 2+ on the left side.       Patellar reflexes are 2+ on the right side and 2+ on the left side.     Comments: Moving all 4 extremities nonlateralizing   Psychiatric:         Attention and Perception: She is inattentive.         Mood and Affect: Mood normal. Affect is blunt.         Speech: Speech is delayed.          Behavior: Behavior normal.         Thought Content: Thought content does not include homicidal or suicidal ideation. Thought content does not include homicidal or suicidal plan.         Central Line At Bedside    Date/Time: 9/11/2021 2:26 AM  Performed by: Naun Fields MD  Authorized by: Naun Fields MD     Consent:     Consent obtained:  Emergent situation    Consent given by:  Patient    Risks discussed:  Arterial puncture, bleeding, infection, incorrect placement, nerve damage and pneumothorax    Alternatives discussed:  Delayed treatment  Pre-procedure details:     Hand hygiene: Hand hygiene performed prior to insertion      Sterile barrier technique: All elements of maximal sterile technique followed      Skin preparation:  2% chlorhexidine    Skin preparation agent: Skin preparation agent completely dried prior to procedure    Anesthesia (see MAR for exact dosages):     Anesthesia method:  Local infiltration    Local anesthetic:  Lidocaine 1% w/o epi  Procedure details:     Location:  R internal jugular    Patient position:  Flat    Procedural supplies:  Triple lumen    Catheter size:  8 Fr    Landmarks identified: yes      Ultrasound guidance: yes      Sterile ultrasound techniques: Sterile gel and sterile probe covers were used      Number of attempts:  1    Successful placement: yes    Post-procedure details:     Post-procedure:  Dressing applied    Assessment:  Blood return through all ports, free fluid flow, placement verified by x-ray and no pneumothorax on x-ray    Patient tolerance of procedure:  Tolerated well, no immediate complications               ED Course  ED Course as of Sep 11 0331   Sat Sep 11, 2021   0012 EKG shows normal sinus rhythm with nonspecific ST-T wave changes with T wave inversion in the high lateral leads which are new compared to the prior EKG  Patient denies any chest pain or shortness of breath she had a prolonged QT    [TS]   0225 Patient has evidence of mild pulmonary  congestion on her chest x-ray central line placement appears good there is no pneumothorax.    [TS]   0233 Patient has T wave inversion in the high lateral leads however more prominent right now than before she denies any chest pain cardiac markers elevated but I think is type II elevation    [TS]   0239 Her blood pressure has improved to 102 systolic she is not tachycardic afebrile sats are normal    [TS]   0324 Because of poor IV access central line was placed by me the patient initially was placed on a D10 drip and Narcan was given to her even with the D10 drip her blood sugar dropped therefore she was given glucagon and D50 amp.  The D10 drip was continued.  Currently she is awake and answering questions.  Her encephalopathy could be secondary to hypoglycemia narcotics or uremia or a mix and match of all 3.  She has some mild metabolic acidosis secondary to her uremia her EKG is abnormal especially in the high lateral leads the troponin I elevation is probably type II the patient is not any chest pain.  We have been trying to replace her potassium and the patient will be admitted to the hospital service for further evaluation and treatment of her condition.    [TS]   0327 Pending also reports a CT scan and chest x-rays    [TS]      ED Course User Index  [TS] Naun Fields MD                                           MDM  Number of Diagnoses or Management Options  Diagnosis management comments: Differential Diagnosis:  I considered toxic-metabolic etiology, hypoglycemia, hyperglycemia, diabetic ketoacidosis, drug overdose, ethanol intoxication, thiamine deficiency, hypothermia, hyponatremia, hypernatremia, organ failure, liver failure, kidney failure, thyroid failure, adrenal failure, hypoxia, hypercarbia, ischemic stroke, intracranial bleed, subarachnoid hemorrhage, closed head injury, subdural hematoma, seizure activity, syncopal episode, infectious etiology, hypertensive encephalopathy, vasculitis, thrombotic  thrombocytopenic purpura and disseminated intravascular coagulation as a possible cause of altered mental status in this patient. This is a partial list of diagnoses considered.              Amount and/or Complexity of Data Reviewed  Clinical lab tests: ordered  Tests in the radiology section of CPT®: ordered and reviewed  Tests in the medicine section of CPT®: reviewed and ordered  Decide to obtain previous medical records or to obtain history from someone other than the patient: yes    Risk of Complications, Morbidity, and/or Mortality  Presenting problems: moderate  Diagnostic procedures: moderate  Management options: moderate        Final diagnoses:   Altered mental status, unspecified altered mental status type   Encephalopathy   Uremia   Hypoglycemia   Metabolic acidosis   Troponin level elevated   Chronic narcotic use       ED Disposition  ED Disposition     ED Disposition Condition Comment    Decision to Admit  Level of Care: Critical Care [6]   Diagnosis: Altered mental status, unspecified altered mental status type [[id number]]   Admitting Physician: THOMAS MATHEWS [[id number]]   Attending Physician: THOMAS MATHEWS [[id number]]   Certification: I Certify That Inpatient Hospital Services Are Medically Necessary For Greater Than 2 Midnights            No follow-up provider specified.       Medication List      No changes were made to your prescriptions during this visit.          Naun Fields MD  09/11/21 0331

## 2021-09-11 NOTE — CONSULTS
Nephrology ([facility]) Consult Note      Patient:  Ena Upton  YOB: 1970  Date of Service: 9/11/2021  MRN: [id number]   Acct: [id number]   Primary Care Physician: Yaron Wiggins APRN  Advance Directive:   Code Status and Medical Interventions:   Ordered at: 09/11/21 0355     Level Of Support Discussed With:    Patient     Code Status:    CPR     Medical Interventions (Level of Support Prior to Arrest):    Full     Admit Date: 9/10/2021       Hospital Day: 0  Referring Provider: Seth Noriega DO      Patient personally seen and examined.  Complete chart including Consults, Notes, Operative Reports, Labs, Cardiology, and Radiology studies reviewed as able.        Subjective:  Ena Upton is a 51 y.o. female  whom we were consulted for end-stage renal disease.  Patient has history of end-stage renal disease on dialysis Tuesday Thursday Saturday.  She was admitted overnight for complaints of hypoglycemia.  Required supplementation and a D10 drip for blood sugar control.  She was seen on initially on dialysis and tolerated without issue.  Denied current chest pain, shortness of air at rest, nausea or vomiting.  Recalled no recent issues with dialysis.    Dialysis   Pt was seen on RRT  Modality: Hemodialysis  Access: Arterial Venous Fistula  Location: left upper  QB: 450  QD: 700  UF: 860      Allergies:  Penicillins, Lamisil [terbinafine], Reglan [metoclopramide], and Stadol [butorphanol]    Home Meds:  Medications Prior to Admission   Medication Sig Dispense Refill Last Dose    albuterol (PROVENTIL HFA;VENTOLIN HFA) 108 (90 BASE) MCG/ACT inhaler Inhale 2 puffs Every 4 (Four) Hours As Needed for wheezing.       carvedilol (COREG) 12.5 MG tablet Take 1 tablet by mouth 2 (Two) Times a Day With Meals.       cloNIDine (CATAPRES) 0.2 MG tablet Take 1 tablet by mouth Every 8 (Eight) Hours.       DULoxetine (CYMBALTA) 60 MG capsule Take 60 mg by mouth daily.        famotidine (PEPCID) 20 MG tablet Take 1 tablet by mouth Daily. 90 tablet 0     hydrALAZINE (APRESOLINE) 50 MG tablet Take 50 mg by mouth 3 (Three) Times a Day.       lactulose (CHRONULAC) 10 GM/15ML solution Take 20 g by mouth 3 (Three) Times a Day.       levothyroxine (SYNTHROID, LEVOTHROID) 25 MCG tablet Take 25 mcg by mouth Daily.       lisinopril (PRINIVIL,ZESTRIL) 20 MG tablet Take 20 mg by mouth 2 (two) times a day.       nicotine (NICODERM CQ) 14 MG/24HR patch Place 1 patch on the skin as directed by provider Daily. Take off it pt smoke. 30 patch 2     NIFEdipine XL (PROCARDIA XL) 60 MG 24 hr tablet Take 60 mg by mouth 2 (two) times a day.       ondansetron (ZOFRAN) 4 MG tablet Take 4 mg by mouth Every 8 (Eight) Hours As Needed for nausea or vomiting.       pregabalin (LYRICA) 75 MG capsule Take 75 mg by mouth 2 (Two) Times a Day.       rOPINIRole (REQUIP) 4 MG tablet Take 4 mg by mouth Every Night.       senna (SENOKOT) 8.6 MG tablet tablet Take 1 tablet by mouth Every Night.       simvastatin (ZOCOR) 20 MG tablet Take 20 mg by mouth Daily.          Medicines:  Current Facility-Administered Medications   Medication Dose Route Frequency Provider Last Rate Last Admin    acetaminophen (TYLENOL) tablet 650 mg  650 mg Oral Q4H PRN Seth Noriega DO   650 mg at 09/11/21 1720    atorvastatin (LIPITOR) tablet 10 mg  10 mg Oral Daily Seth Noriega DO   10 mg at 09/11/21 0843    carvedilol (COREG) tablet 12.5 mg  12.5 mg Oral BID With Meals Seth Noriega DO   12.5 mg at 09/11/21 1742    dextrose (D50W) 25 g/ 50mL Intravenous Solution 25 g  25 g Intravenous Q15 Min PRN Adán Merrill DO        dextrose (GLUTOSE) oral gel 15 g  15 g Oral Q15 Min PRN Adán Merrill, DO        DULoxetine (CYMBALTA) DR capsule 60 mg  60 mg Oral Daily Seth Noriega DO   60 mg at 09/11/21 0843    famotidine (PEPCID) tablet 20 mg  20 mg Oral Daily Seth Noriega DO   20 mg at 09/11/21 0843    glucagon (human  recombinant) (GLUCAGEN DIAGNOSTIC) injection 1 mg  1 mg Subcutaneous Q15 Min PRN Adán Merrill DO        insulin lispro (humaLOG) injection 2-7 Units  2-7 Units Subcutaneous TID AC Adán Merrill DO   2 Units at 09/11/21 1721    lactulose solution 20 g  20 g Oral TID Seth Noriega DO        levothyroxine (SYNTHROID, LEVOTHROID) tablet 25 mcg  25 mcg Oral Q AM Seth Noriega DO   25 mcg at 09/11/21 0844    lisinopril (PRINIVIL,ZESTRIL) tablet 20 mg  20 mg Oral Q12H Adán Merrill DO   20 mg at 09/11/21 1720    nicotine (NICODERM CQ) 14 MG/24HR patch 1 patch  1 patch Transdermal Q24H Seth Noriega DO   1 patch at 09/11/21 0843    NIFEdipine XL (PROCARDIA XL) 24 hr tablet 60 mg  60 mg Oral BID Adán Merrill DO        ondansetron (ZOFRAN) injection 4 mg  4 mg Intravenous Q6H PRN Seth Noriega DO        pregabalin (LYRICA) capsule 75 mg  75 mg Oral Q12H Seth Noriega DO   75 mg at 09/11/21 1112    rOPINIRole (REQUIP) tablet 4 mg  4 mg Oral Nightly Seth Noriega DO        sodium chloride 0.9 % flush 10 mL  10 mL Intravenous PRN Naun Fields MD        sodium chloride 0.9 % flush 10 mL  10 mL Intravenous PRN Naun Fields MD        sodium chloride 0.9 % flush 10 mL  10 mL Intravenous Q12H Seth Noriega DO        sodium chloride 0.9 % flush 10 mL  10 mL Intravenous PRN Seth Noriega DO           Past Medical History:  Past Medical History:   Diagnosis Date    Anxiety     Arthritis     Chronic kidney disease     STAGE V RENAL FAILURE    Depression     Diabetes mellitus (CMS/HCC)     HTN (hypertension)     Hypothyroidism     Obesity     Tremors of nervous system        Past Surgical History:  Past Surgical History:   Procedure Laterality Date    ARTERIOVENOUS FISTULA      BREAST BIOPSY      CARPAL TUNNEL RELEASE      CATH LAB PROCEDURE      CHOLECYSTECTOMY      TUBAL ABDOMINAL LIGATION         Family History  Family History   Problem Relation Age of Onset     Cancer Other     Hypertension Other     Kidney disease Other     Heart disease Other     Diabetes Other     Diabetes Mother     Diabetes Maternal Aunt     Diabetes Maternal Grandmother        Social History  Social History     Socioeconomic History    Marital status:      Spouse name: Not on file    Number of children: Not on file    Years of education: Not on file    Highest education level: Not on file   Tobacco Use    Smoking status: Current Every Day Smoker     Packs/day: 0.50    Smokeless tobacco: Never Used    Tobacco comment: CUTTING BACK AND TRYING TO STOP SMOKING   Substance and Sexual Activity    Alcohol use: No    Drug use: No    Sexual activity: Defer         Review of Systems:  History obtained from chart review and the patient  General ROS: No fever or chills  Respiratory ROS: No cough, shortness of breath, wheezing  Cardiovascular ROS: No chest pain or palpitations  Gastrointestinal ROS: No abdominal pain or melena  Genito-Urinary ROS: No dysuria or hematuria  Psych ROS: No anxiety and depression  14 point ROS reviewed with the patient and negative except as noted above and in the HPI unless unable to obtain.      Objective:  Patient Vitals for the past 24 hrs:   BP Temp Temp src Pulse Resp SpO2 Height Weight   09/11/21 1500 162/70 99 °F (37.2 °C) Oral 72 16 97 % -- --   09/11/21 1355 -- -- -- 72 -- 100 % -- --   09/11/21 1339 -- -- -- 72 -- 95 % -- --   09/11/21 1331 180/79 -- -- 71 -- 98 % -- --   09/11/21 1301 169/82 -- -- 71 -- 100 % -- --   09/11/21 1231 174/81 -- -- 70 -- 100 % -- --   09/11/21 1201 156/91 -- -- 68 -- 100 % -- --   09/11/21 1131 148/80 -- -- 64 -- 100 % -- --   09/11/21 1125 -- -- -- 64 -- 98 % -- --   09/11/21 1117 -- -- -- 66 -- 100 % -- --   09/11/21 1113 -- -- -- 67 -- 99 % -- --   09/11/21 1107 -- -- -- 65 -- 97 % -- --   09/11/21 1101 178/79 -- -- 65 -- 99 % -- --   09/11/21 1031 164/80 -- -- 66 -- 97 % -- --   09/11/21 1022 -- -- -- 64 -- 99 % -- --   09/11/21  "1001 169/78 -- -- 64 -- 97 % -- --   09/11/21 0931 158/78 -- -- 63 -- 100 % -- --   09/11/21 0901 159/80 -- -- 65 -- 100 % -- --   09/11/21 0831 161/79 -- -- 62 -- 100 % -- --   09/11/21 0801 139/75 -- -- 61 -- 100 % -- --   09/11/21 0755 -- -- -- 61 -- 99 % -- --   09/11/21 0731 150/72 -- -- 60 -- 100 % -- --   09/11/21 0716 -- -- -- 60 -- 97 % -- --   09/11/21 0631 135/67 -- -- 58 -- 100 % -- --   09/11/21 0601 120/63 -- -- 56 -- 98 % -- --   09/11/21 0501 131/68 -- -- 56 -- 98 % -- --   09/11/21 0431 118/62 -- -- 56 -- 98 % -- --   09/11/21 0416 120/63 -- -- 56 -- 99 % -- --   09/11/21 0401 111/64 -- -- 55 -- 97 % -- --   09/11/21 0346 101/65 -- -- -- -- -- -- --   09/11/21 0345 -- -- -- 55 -- 97 % -- --   09/11/21 0231 97/54 -- -- 56 -- 99 % -- --   09/11/21 0147 97/55 -- -- 57 -- 96 % -- --   09/11/21 0116 101/57 -- -- 62 -- -- -- --   09/11/21 0107 -- 97.9 °F (36.6 °C) Axillary -- -- -- -- --   09/11/21 0000 106/71 -- -- 67 18 98 % 165.1 cm (65\") 76 kg (167 lb 8.8 oz)     No intake or output data in the 24 hours ending 09/11/21 1846  General: awake/alert   Chest:  clear to auscultation bilaterally without respiratory distress  CVS: regular rate and rhythm  Abdominal: soft, nontender, positive bowel sounds  Extremities: no cyanosis or edema  Skin: warm and dry without rash      Labs:  Results from last 7 days   Lab Units 09/11/21  0511 09/11/21  0033   WBC 10*3/mm3 8.95 8.75   HEMOGLOBIN g/dL 7.3* 8.2*   HEMATOCRIT % 22.7* 25.8*   PLATELETS 10*3/mm3 170 217         Results from last 7 days   Lab Units 09/11/21  0511 09/11/21  0033   SODIUM mmol/L 138 141   POTASSIUM mmol/L 2.9* 2.3*   CHLORIDE mmol/L 96* 96*   CO2 mmol/L 27.0 27.0   BUN mg/dL 37* 34*   CREATININE mg/dL 3.91* 3.98*   CALCIUM mg/dL 8.5* 9.2   BILIRUBIN mg/dL 0.2 0.3   ALK PHOS U/L 108 125*   ALT (SGPT) U/L 7 9   AST (SGOT) U/L 14 15   GLUCOSE mg/dL 122* 40*       Radiology:   Imaging Results (Last 72 Hours)       Procedure Component Value Units " Date/Time    CT Head Without Contrast [[id number]] Collected: 09/11/21 0712     Updated: 09/11/21 0717    Narrative:      EXAMINATION:   CT HEAD WO CONTRAST-  9/11/2021 7:12 AM CDT     HISTORY: CT BRAIN without contrast dated 9/11/2021 2:41 AM CDT     HISTORY: Headache     COMPARISON: June 19, 2021      DOSE LENGTH PRODUCT: 607 mGy cm     TECHNIQUE: Serial axial tomographic images of the brain were obtained  without the use of intravenous contrast.      FINDINGS:   The midline structures are nondisplaced. The ventricles and basilar  cisterns are normal in size and configuration. There is no evidence of  intracranial hemorrhage or mass-effect. The gray-white matter  differentiation is maintained. The sulci have a normal configuration,  and there are no abnormal extra-axial fluid collections. The structures  of the posterior fossa are unremarkable.      The included orbits and their contents are unremarkable. The visualized  paranasal sinuses, mastoid air cells and middle ear cavities are clear.  The visualized osseous structures and overlying soft tissues of the  skull and face are unremarkable.        Impression:      1. No acute intracranial process.        This report was finalized on 09/11/2021 07:14 by Dr. Lazaro Martinez MD.    XR Chest 1 View [[id number]] Collected: 09/11/21 0712     Updated: 09/11/21 0715    Narrative:      EXAM: XR CHEST 1 VW- 9/11/2021 2:12 AM CDT     HISTORY: central line placement       COMPARISON: September 11, 2021 at 12:40 AM.     TECHNIQUE: Frontal radiograph of the chest     FINDINGS:   The lungs are clear. Cardiac silhouettes mildly enlarged. Right internal  jugular catheters satisfactorily position in superior vena cava..      The osseous structures and surrounding soft tissues demonstrate no acute  abnormality.          Impression:      1. Mild cardiomegaly with no evidence pulmonary vascular congestion.        This report was finalized on 09/11/2021 07:12 by Dr. Lazaro Martinez MD.     XR Chest 1 View [[id number]] Collected: 09/11/21 0654     Updated: 09/11/21 0658    Narrative:      EXAM: XR CHEST 1 VW- 9/11/2021 12:54 AM CDT     HISTORY: Chest pain       COMPARISON: August 17, 2021.     TECHNIQUE: Frontal radiograph of the chest     FINDINGS:   Vague groundglass infiltrates noted in the right lung base.. Cardiac  silhouettes moderately enlarged..      The osseous structures and surrounding soft tissues demonstrate no acute  abnormality.          Impression:      1. Vague groundglass infiltrate right lung base. This is suspicious for  an early inflammatory process.        This report was finalized on 09/11/2021 06:55 by Dr. Lazaro Martinez MD.            Culture:  No results found for: BLOODCX, URINECX, WOUNDCX, MRSACX, RESPCX, STOOLCX      Assessment   End-stage renal disease  Hypertension  Hypokalemia  Anemia chronic kidney disease  Secondary hyperparathyroidism  Diabetes type 2 with hypoglycemia on admit    Plan:  Discussed with patient, nursing  Work-up ordered ongoing  Monitor labs   Dialysis Tuesday Thursday Saturday per routine schedule  High K bath      Thank you for the consult, we appreciate the opportunity to provide care to your patients.  Feel free to contact me if I can be of any further assistance.      Surjit Avitia MD  9/11/2021  18:46 CDT

## 2021-09-11 NOTE — H&P
[facility] Medicine Services  HISTORY AND PHYSICAL    Date of Admission: 9/10/2021  Primary Care Physician: Yaron Wiggins APRN    Subjective     Chief Complaint: AMS    History of Present Illness  51-year-old female presents to the emergency department with altered mental status.  The patient is a chronic dialysis patient.  She is noted to have past medical history of diabetes mellitus, but no insulin is noted on her home med list.  When EMS arrived to the patient's home, her glucose did not register.  She was given an amp of D50 in the field.  She was also placed on a D10 drip in emergency department and given a D50 and glucagon injection along with this.  On my examination, the patient is awake and alert.  She knows her location.  And she is asking for pain medication.  She is able to move all extremities without difficulty.  She does not complain of chest pain.  No family is present.    EDMD noted septal changes on her EKG, abnormal from previous EKG.  The patient did have elevated cardiac markers.    6/26/2020 cardiac cath:  Nonobstructive coronary artery disease.   Normal LV systolic function.   Subocclusive left renal artery distally with atretic right renal vasculature and no dye retention in renal       Review of Systems   AMS  Back pain    Otherwise complete ROS reviewed and negative except as mentioned in the HPI.    Past Medical History:   Past Medical History:   Diagnosis Date   • Anxiety    • Arthritis    • Chronic kidney disease     STAGE V RENAL FAILURE   • Depression    • Diabetes mellitus (CMS/HCC)    • HTN (hypertension)    • Hypothyroidism    • Obesity    • Tremors of nervous system      Past Surgical History:  Past Surgical History:   Procedure Laterality Date   • ARTERIOVENOUS FISTULA     • BREAST BIOPSY     • CARPAL TUNNEL RELEASE     • CHOLECYSTECTOMY     • TUBAL ABDOMINAL LIGATION       Social History:  reports that she has been smoking. She has  been smoking about 0.50 packs per day. She has never used smokeless tobacco. She reports that she does not drink alcohol and does not use drugs.    Family History: family history includes Cancer in an other family member; Diabetes in her maternal aunt, maternal grandmother, mother, and another family member; Heart disease in an other family member; Hypertension in an other family member; Kidney disease in an other family member.       Allergies:  Allergies   Allergen Reactions   • Penicillins Hives and Shortness Of Breath   • Lamisil [Terbinafine]    • Reglan [Metoclopramide] GI Intolerance   • Stadol [Butorphanol] Nausea And Vomiting       Medications:  Prior to Admission medications    Medication Sig Start Date End Date Taking? Authorizing Provider   albuterol (PROVENTIL HFA;VENTOLIN HFA) 108 (90 BASE) MCG/ACT inhaler Inhale 2 puffs Every 4 (Four) Hours As Needed for wheezing.    Blanche Neil MD   carvedilol (COREG) 12.5 MG tablet Take 1 tablet by mouth 2 (Two) Times a Day With Meals. 6/29/21   Adán Merirll DO   cloNIDine (CATAPRES) 0.2 MG tablet Take 1 tablet by mouth Every 8 (Eight) Hours. 6/29/21   Adán Merrill DO   DULoxetine (CYMBALTA) 60 MG capsule Take 60 mg by mouth daily.    Blanche Neil MD   famotidine (PEPCID) 20 MG tablet Take 1 tablet by mouth Daily. 8/21/21   Narinder Madrid MD   hydrALAZINE (APRESOLINE) 50 MG tablet Take 50 mg by mouth 3 (Three) Times a Day.    Blanche Neil MD   lactulose (CHRONULAC) 10 GM/15ML solution Take 20 g by mouth 3 (Three) Times a Day.    Blanche Neil MD   levothyroxine (SYNTHROID, LEVOTHROID) 25 MCG tablet Take 25 mcg by mouth Daily.    Blanche Neil MD   lisinopril (PRINIVIL,ZESTRIL) 20 MG tablet Take 20 mg by mouth 2 (two) times a day.    Blanche Neil MD   nicotine (NICODERM CQ) 14 MG/24HR patch Place 1 patch on the skin as directed by provider Daily. Take off it pt smoke. 8/21/21   Narinder Madrid MD  "  NIFEdipine XL (PROCARDIA XL) 60 MG 24 hr tablet Take 60 mg by mouth 2 (two) times a day.    Blanche Neil MD   ondansetron (ZOFRAN) 4 MG tablet Take 4 mg by mouth Every 8 (Eight) Hours As Needed for nausea or vomiting.    Blanche Neil MD   pregabalin (LYRICA) 75 MG capsule Take 75 mg by mouth 2 (Two) Times a Day.    Blanche Neil MD   rOPINIRole (REQUIP) 4 MG tablet Take 4 mg by mouth Every Night.    Blanche Neil MD   senna (SENOKOT) 8.6 MG tablet tablet Take 1 tablet by mouth Every Night.    Blanche Neil MD   simvastatin (ZOCOR) 20 MG tablet Take 20 mg by mouth Daily.    Blanche Neil MD I have utilized all available immediate resources to obtain, update, and review the patient's current medications.    Objective     Vital Signs: /65   Pulse 55   Temp 97.9 °F (36.6 °C) (Axillary)   Resp 18   Ht 165.1 cm (65\")   Wt 76 kg (167 lb 8.8 oz)   LMP  (LMP Unknown)   SpO2 97%   BMI 27.88 kg/m²   Physical Exam  Vitals reviewed.   Constitutional:       Appearance: She is ill-appearing.      Comments: Chronically ill appearing.    HENT:      Head: Normocephalic and atraumatic.      Nose: Nose normal.      Mouth/Throat:      Mouth: Mucous membranes are dry.      Pharynx: No oropharyngeal exudate.   Eyes:      General: No scleral icterus.     Conjunctiva/sclera: Conjunctivae normal.   Cardiovascular:      Rate and Rhythm: Normal rate and regular rhythm.      Heart sounds: Murmur heard.     Pulmonary:      Effort: Pulmonary effort is normal.      Breath sounds: Normal breath sounds.   Abdominal:      General: There is no distension.      Palpations: Abdomen is soft.   Musculoskeletal:         General: No swelling or tenderness.      Cervical back: Normal range of motion and neck supple.      Comments: Left index finger blood.    Skin:     General: Skin is warm and dry.   Neurological:      General: No focal deficit present.      Mental Status: She is alert.    "   Cranial Nerves: No cranial nerve deficit.      Motor: Weakness present.   Psychiatric:      Comments: Flat affect. Asking for pain medications.        Results Reviewed:  Lab Results (last 24 hours)     Procedure Component Value Units Date/Time    POC Glucose Once [[id number]]  (Abnormal) Collected: 09/11/21 0343    Specimen: Blood Updated: 09/11/21 0354     Glucose 140 mg/dL      Comment: : [id number] Trupti AlmodovarMeter ID: [id number]       Troponin [[id number]]  (Abnormal) Collected: 09/11/21 0218    Specimen: Blood Updated: 09/11/21 0309     Troponin T 0.149 ng/mL     Narrative:      Troponin T Reference Range:  <= 0.03 ng/mL-   Negative for AMI  >0.03 ng/mL-     Abnormal for myocardial necrosis.  Clinicians would have to utilize clinical acumen, EKG, Troponin and serial changes to determine if it is an Acute Myocardial Infarction or myocardial injury due to an underlying chronic condition.       Results may be falsely decreased if patient taking Biotin.      POC Glucose Once [[id number]]  (Abnormal) Collected: 09/11/21 0234    Specimen: Blood Updated: 09/11/21 0245     Glucose 176 mg/dL      Comment: : [id number] Jesse DaleMeter ID: [id number]       [place] [facility] [[id number]] Collected: 09/11/21 0033    Specimen: Blood Updated: 09/11/21 0145    Narrative:      The following orders were created for panel order [place] [facility].  Procedure                               Abnormality         Status                     ---------                               -----------         ------                     Green Top (Gel)[[id number]]                                  Final result               Lavender Top[[id number]]                                     Final result               Red Top[[id number]]                                          Final result               Light Blue Top[[id number]]                                   Final result                 Please view results for these tests on the individual orders.     Green Top (Gel) [[id number]] Collected: 09/11/21 0033    Specimen: Blood Updated: 09/11/21 0145     Extra Tube Hold for add-ons.     Comment: Auto resulted.       Red Top [[id number]] Collected: 09/11/21 0033    Specimen: Blood Updated: 09/11/21 0145     Extra Tube Hold for add-ons.     Comment: Auto resulted.       Lavender Top [[id number]] Collected: 09/11/21 0033    Specimen: Blood Updated: 09/11/21 0145     Extra Tube hold for add-on     Comment: Auto resulted       Light Blue Top [[id number]] Collected: 09/11/21 0036    Specimen: Blood Updated: 09/11/21 0145     Extra Tube hold for add-on     Comment: Auto resulted       Urine Drug Screen - Urine, Catheter [[id number]]  (Normal) Collected: 09/11/21 0108    Specimen: Urine, Catheter Updated: 09/11/21 0138     THC, Screen, Urine Negative     Phencyclidine (PCP), Urine Negative     Cocaine Screen, Urine Negative     Methamphetamine, Ur Negative     Opiate Screen Negative     Amphetamine Screen, Urine Negative     Benzodiazepine Screen, Urine Negative     Tricyclic Antidepressants Screen Negative     Methadone Screen, Urine Negative     Barbiturates Screen, Urine Negative     Oxycodone Screen, Urine Negative     Propoxyphene Screen Negative     Buprenorphine, Screen, Urine Negative    Narrative:      Cutoff For Drugs Screened:    Amphetamines               500 ng/ml  Barbiturates               200 ng/ml  Benzodiazepines            150 ng/ml  Cocaine                    150 ng/ml  Methadone                  200 ng/ml  Opiates                    100 ng/ml  Phencyclidine               25 ng/ml  THC                            50 ng/ml  Methamphetamine            500 ng/ml  Tricyclic Antidepressants  300 ng/ml  Oxycodone                  100 ng/ml  Propoxyphene               300 ng/ml  Buprenorphine               10 ng/ml    The normal value for all drugs tested is negative. This report includes unconfirmed screening results, with the cutoff values listed, to be used for  medical treatment purposes only.  Unconfirmed results must not be used for non-medical purposes such as employment or legal testing.  Clinical consideration should be applied to any drug of abuse test, particularly when unconfirmed results are used.      Troponin [[id number]]  (Abnormal) Collected: 09/11/21 0033    Specimen: Blood Updated: 09/11/21 0134     Troponin T 0.169 ng/mL     Narrative:      Troponin T Reference Range:  <= 0.03 ng/mL-   Negative for AMI  >0.03 ng/mL-     Abnormal for myocardial necrosis.  Clinicians would have to utilize clinical acumen, EKG, Troponin and serial changes to determine if it is an Acute Myocardial Infarction or myocardial injury due to an underlying chronic condition.       Results may be falsely decreased if patient taking Biotin.      Comprehensive Metabolic Panel [[id number]]  (Abnormal) Collected: 09/11/21 0033    Specimen: Blood Updated: 09/11/21 0133     Glucose 40 mg/dL      BUN 34 mg/dL      Creatinine 3.98 mg/dL      Sodium 141 mmol/L      Potassium 2.3 mmol/L      Chloride 96 mmol/L      CO2 27.0 mmol/L      Calcium 9.2 mg/dL      Total Protein 7.1 g/dL      Albumin 3.80 g/dL      ALT (SGPT) 9 U/L      AST (SGOT) 15 U/L      Alkaline Phosphatase 125 U/L      Total Bilirubin 0.3 mg/dL      eGFR Non African Amer 12 mL/min/1.73      Comment: <15 Indicative of kidney failure.        eGFR   Amer --     Comment: <15 Indicative of kidney failure.        Globulin 3.3 gm/dL      A/G Ratio 1.2 g/dL      BUN/Creatinine Ratio 8.5     Anion Gap 18.0 mmol/L     Narrative:      GFR Normal >60  Chronic Kidney Disease <60  Kidney Failure <15      Urinalysis, Microscopic Only - Urine, Catheter In/Out [[id number]]  (Abnormal) Collected: 09/11/21 0108    Specimen: Urine, Catheter In/Out Updated: 09/11/21 0131     RBC, UA 3-5 /HPF      WBC, UA 0-2 /HPF      Bacteria, UA None Seen /HPF      Squamous Epithelial Cells, UA 0-2 /HPF      Hyaline Casts, UA 0-2 /LPF      Methodology  Automated Microscopy    Urinalysis With Culture If Indicated - Urine, Catheter In/Out [[id number]]  (Abnormal) Collected: 09/11/21 0108    Specimen: Urine, Catheter In/Out Updated: 09/11/21 0131     Color, UA Yellow     Appearance, UA Clear     pH, UA 7.0     Specific Gravity, UA 1.017     Glucose, UA >=1000 mg/dL (3+)     Ketones, UA Negative     Bilirubin, UA Negative     Blood, UA Negative     Protein,  mg/dL (2+)     Leuk Esterase, UA Negative     Nitrite, UA Negative     Urobilinogen, UA 0.2 E.U./dL    COVID-19,[facility] IN-HOUSE,Nasal Swab No Transport Media 3-4 HR TAT - Swab, Nasal Cavity [[id number]]  (Normal) Collected: 09/11/21 0040    Specimen: Swab from Nasal Cavity Updated: 09/11/21 0129     COVID19 Not Detected    Narrative:      Fact sheet for providers: https://www.fda.gov/media/387900/download     Fact sheet for patients: https://www.fda.gov/media/876903/download    Test performed by PCR.    Consider negative results in combination with clinical observations, patient history, and epidemiological information.    POC Glucose Once [[id number]]  (Abnormal) Collected: 09/11/21 0116    Specimen: Blood Updated: 09/11/21 0127     Glucose 150 mg/dL      Comment: : [id number] Trupti KaitlinMeter ID: [id number]       D-dimer, Quantitative [[id number]]  (Abnormal) Collected: 09/11/21 0036    Specimen: Blood Updated: 09/11/21 0120     D-Dimer, Quantitative 1.22 mg/L (FEU)     Narrative:      Reference Range is 0-0.50 mg/L FEU. However, results <0.50 mg/L FEU tends to rule out DVT or PE. Results >0.50 mg/L FEU are not useful in predicting absence or presence of DVT or PE.      aPTT [[id number]]  (Normal) Collected: 09/11/21 0036    Specimen: Blood Updated: 09/11/21 0120     PTT 30.9 seconds     Protime-INR [[id number]]  (Normal) Collected: 09/11/21 0036    Specimen: Blood Updated: 09/11/21 0120     Protime 13.2 Seconds      INR 1.04    Blood Gas, Venous - [[id number]]  (Abnormal) Collected: 09/11/21 0109     Specimen: Venous Blood Updated: 09/11/21 0117     Site OTHER     pH, Venous 7.398 pH Units      pCO2, Venous 50.3 mm Hg      pO2, Venous 49.0 mm Hg      HCO3, Venous 31.0 mmol/L      Comment: 83 Value above reference range        Base Excess, Venous 5.4 mmol/L      Comment: 83 Value above reference range        O2 Saturation, Venous 83.3 %      Comment: 83 Value above reference range        Temperature 37.0 C      Barometric Pressure for Blood Gas 752 mmHg      Modality Room Air     Ventilator Mode NA     Collected by [id number]     Comment: Meter: [id number]     :  [id number]       Ammonia [[id number]]  (Normal) Collected: 09/11/21 0033    Specimen: Blood Updated: 09/11/21 0116     Ammonia 13 umol/L     Ethanol [[id number]] Collected: 09/11/21 0033    Specimen: Blood Updated: 09/11/21 0115     Ethanol % <0.010 %     Narrative:      Not for legal purposes. Chain of Custody not followed.     Magnesium [[id number]]  (Normal) Collected: 09/11/21 0033    Specimen: Blood Updated: 09/11/21 0114     Magnesium 2.4 mg/dL     POC Glucose Once [[id number]]  (Abnormal) Collected: 09/11/21 0056    Specimen: Blood Updated: 09/11/21 0107     Glucose 41 mg/dL      Comment: : [id number] Adrianlito JamiMeter ID: [id number]       CBC & Differential [[id number]]  (Abnormal) Collected: 09/11/21 0033    Specimen: Blood Updated: 09/11/21 0049    Narrative:      The following orders were created for panel order CBC & Differential.  Procedure                               Abnormality         Status                     ---------                               -----------         ------                     CBC Auto Differential[[id number]]        Abnormal            Final result                 Please view results for these tests on the individual orders.    CBC Auto Differential [[id number]]  (Abnormal) Collected: 09/11/21 0033    Specimen: Blood Updated: 09/11/21 0049     WBC 8.75 10*3/mm3      RBC 2.79 10*6/mm3      Hemoglobin 8.2 g/dL       Hematocrit 25.8 %      MCV 92.5 fL      MCH 29.4 pg      MCHC 31.8 g/dL      RDW 16.1 %      RDW-SD 54.0 fl      MPV 9.7 fL      Platelets 217 10*3/mm3      Neutrophil % 68.8 %      Lymphocyte % 15.3 %      Monocyte % 11.4 %      Eosinophil % 3.2 %      Basophil % 1.1 %      Immature Grans % 0.2 %      Neutrophils, Absolute 6.01 10*3/mm3      Lymphocytes, Absolute 1.34 10*3/mm3      Monocytes, Absolute 1.00 10*3/mm3      Eosinophils, Absolute 0.28 10*3/mm3      Basophils, Absolute 0.10 10*3/mm3      Immature Grans, Absolute 0.02 10*3/mm3      nRBC 0.0 /100 WBC     POC Glucose Once [[id number]]  (Normal) Collected: 09/11/21 0003    Specimen: Blood Updated: 09/11/21 0014     Glucose 81 mg/dL      Comment: : [id number] [facility] ID: [id number]           Imaging Results (Last 24 Hours)     Procedure Component Value Units Date/Time    CT Head Without Contrast [[id number]] Resulted: 09/11/21 0121     Updated: 09/11/21 0257    XR Chest 1 View [[id number]] Resulted: 09/11/21 0222     Updated: 09/11/21 0223    XR Chest 1 View [[id number]] Resulted: 09/11/21 0054     Updated: 09/11/21 0056        I have personally reviewed and interpreted the radiology studies and ECG obtained at time of admission.     Assessment / Plan     Assessment:   Active Hospital Problems    Diagnosis    • Altered mental status      Impression:  1. AMS  2. CKD on dialysis  3. Hypoglycemia in a NIDDM  4. Chronic anemia  5. Hypokalemia  6. Elevated troponin and EKG changes    Plan:   1.  Neuro checks, patient appears to have improved on my examination  2.  Nephrology consult, patient a chronic dialysis patient  3.  Labs in the morning  4.  Hold blood pressure medicines, as the patient has marginal blood pressure  5.  Trend cardiac markers with EKG in the morning  6.  Consult cardiology  7.  Fall precautions      Code Status/Advanced Care Plan: Full    The patient's surrogate decision maker is family.     I discussed my findings and  recommendations with the patient.    Estimated length of stay is 2 to 3 days.     The patient was seen and examined by me on 9/11/2021 at 4.    Electronically signed by Seth Noriega DO, 09/11/21, 04:11 CDT.

## 2021-09-11 NOTE — PLAN OF CARE
Goal Outcome Evaluation:  Plan of Care Reviewed With: patient        Progress: no change (eval)  Outcome Summary: ST clinical bedside swallow evaluation completed d/t failed swallow screen. Pt admitted for acute AMS. Hx chronic dialysis, DM, HTN. Pt oriented x4 and able to follow commands without difficulty. Full range of consistencies presented except mech soft. Timely swallow and adequate mastication of regular solids. No overt s/s of aspiration observed. ST cannot fully r/o silent aspiration. Pt okay to begin regular solids diet with thin liquids. Meds whole with thin liquids. RN to continue to monitor for any increased lung congsetion. General aspiration precautions with PO. No further speech services warranted at this time. MD to reconsult if change or new concern.

## 2021-09-11 NOTE — PLAN OF CARE
Problem: Adult Inpatient Plan of Care  Goal: Plan of Care Review  Outcome: Ongoing, Progressing  Goal: Patient-Specific Goal (Individualized)  Outcome: Ongoing, Progressing  Goal: Absence of Hospital-Acquired Illness or Injury  Outcome: Ongoing, Progressing  Goal: Optimal Comfort and Wellbeing  Outcome: Ongoing, Progressing  Goal: Readiness for Transition of Care  Outcome: Ongoing, Progressing  Intervention: Mutually Develop Transition Plan  Recent Flowsheet Documentation  Taken 9/11/2021 1611 by Cynthia Angeles, RN  Transportation Anticipated:   health plan transportation   public transportation   family or friend will provide  Patient/Family Anticipated Services at Transition: none  Patient/Family Anticipates Transition to: home  Taken 9/11/2021 1605 by Cynthia Angeles, RN  Equipment Currently Used at Home:   cane, straight   glucometer   walker, standard   wheelchair     Problem: Skin Injury Risk Increased  Goal: Skin Health and Integrity  Outcome: Ongoing, Progressing   Goal Outcome Evaluation:

## 2021-09-11 NOTE — PAYOR COMM NOTE
"ADMIT INPT 9-10-21  UR  [id number]    Chemo Upton (51 y.o. Female)     Date of Birth Social Security Number Address Home Phone MRN    1970  [id number] [facility] [address] [phone] [id number]    Gnosticist Marital Status          Other        Admission Date Admission Type Admitting Provider Attending Provider Department, Room/Bed    9/10/21 Emergency Adán Merrill, Adán Arteaga,  [facility] 3A, 354/1    Discharge Date Discharge Disposition Discharge Destination                       Attending Provider: Adán Merrill DO    Allergies: Penicillins, Lamisil [Terbinafine], Reglan [Metoclopramide], Stadol [Butorphanol]    Isolation: None   Infection: COVID (rule out) (09/11/21)   Code Status: CPR    Ht: 165.1 cm (65\")   Wt: 76 kg (167 lb 8.8 oz)    Admission Cmt: None   Principal Problem: None                Active Insurance as of 9/10/2021     Primary Coverage     Payor Plan Insurance Group Employer/Plan Group    [facility] [employer] MEDICARE REPLACEMENT      Payor Plan Address Payor Plan Phone Number Payor Plan Fax Number Effective Dates    [address] [address] [phone]  6/1/2021 - None Entered    [facility] [address]       Subscriber Name Subscriber Birth Date Member ID       CHEMO UPTON 1970 [id number]           Secondary Coverage     Payor Plan Insurance Group Employer/Plan Group    [place]      Payor Plan Address Payor Plan Phone Number Payor Plan Fax Number Effective Dates    [address] [phone]  8/17/2021 - None Entered    [facility] [id number]       Subscriber Name Subscriber Birth Date Member ID       CHEMO UPTON 1970 [id number]                 Emergency Contacts      (Rel.) Home Phone Work Phone Mobile Phone    Dmitriy Galvez (Friend) -- -- [phone]    Philippe Kassandra (Sister) [phone] -- [phone]               History & Physical      Seth Noriega DO at " 09/11/21 0411              [facility] Medicine Services  HISTORY AND PHYSICAL    Date of Admission: 9/10/2021  Primary Care Physician: Yaron Wiggins APRN    Subjective     Chief Complaint: AMS    History of Present Illness  51-year-old female presents to the emergency department with altered mental status.  The patient is a chronic dialysis patient.  She is noted to have past medical history of diabetes mellitus, but no insulin is noted on her home med list.  When EMS arrived to the patient's home, her glucose did not register.  She was given an amp of D50 in the field.  She was also placed on a D10 drip in emergency department and given a D50 and glucagon injection along with this.  On my examination, the patient is awake and alert.  She knows her location.  And she is asking for pain medication.  She is able to move all extremities without difficulty.  She does not complain of chest pain.  No family is present.    EDMD noted septal changes on her EKG, abnormal from previous EKG.  The patient did have elevated cardiac markers.    6/26/2020 cardiac cath:  Nonobstructive coronary artery disease.   Normal LV systolic function.   Subocclusive left renal artery distally with atretic right renal vasculature and no dye retention in renal       Review of Systems   AMS  Back pain    Otherwise complete ROS reviewed and negative except as mentioned in the HPI.    Past Medical History:   Past Medical History:   Diagnosis Date   • Anxiety    • Arthritis    • Chronic kidney disease     STAGE V RENAL FAILURE   • Depression    • Diabetes mellitus (CMS/HCC)    • HTN (hypertension)    • Hypothyroidism    • Obesity    • Tremors of nervous system      Past Surgical History:  Past Surgical History:   Procedure Laterality Date   • ARTERIOVENOUS FISTULA     • BREAST BIOPSY     • CARPAL TUNNEL RELEASE     • CHOLECYSTECTOMY     • TUBAL ABDOMINAL LIGATION       Social History:  reports that she has  been smoking. She has been smoking about 0.50 packs per day. She has never used smokeless tobacco. She reports that she does not drink alcohol and does not use drugs.    Family History: family history includes Cancer in an other family member; Diabetes in her maternal aunt, maternal grandmother, mother, and another family member; Heart disease in an other family member; Hypertension in an other family member; Kidney disease in an other family member.       Allergies:  Allergies   Allergen Reactions   • Penicillins Hives and Shortness Of Breath   • Lamisil [Terbinafine]    • Reglan [Metoclopramide] GI Intolerance   • Stadol [Butorphanol] Nausea And Vomiting       Medications:  Prior to Admission medications    Medication Sig Start Date End Date Taking? Authorizing Provider   albuterol (PROVENTIL HFA;VENTOLIN HFA) 108 (90 BASE) MCG/ACT inhaler Inhale 2 puffs Every 4 (Four) Hours As Needed for wheezing.    Blanche Neil MD   carvedilol (COREG) 12.5 MG tablet Take 1 tablet by mouth 2 (Two) Times a Day With Meals. 6/29/21   Adán Merrill DO   cloNIDine (CATAPRES) 0.2 MG tablet Take 1 tablet by mouth Every 8 (Eight) Hours. 6/29/21   Adán Merrill DO   DULoxetine (CYMBALTA) 60 MG capsule Take 60 mg by mouth daily.    Blanche Neil MD   famotidine (PEPCID) 20 MG tablet Take 1 tablet by mouth Daily. 8/21/21   Narinder Madrid MD   hydrALAZINE (APRESOLINE) 50 MG tablet Take 50 mg by mouth 3 (Three) Times a Day.    Blanche Neil MD   lactulose (CHRONULAC) 10 GM/15ML solution Take 20 g by mouth 3 (Three) Times a Day.    Blanche Neil MD   levothyroxine (SYNTHROID, LEVOTHROID) 25 MCG tablet Take 25 mcg by mouth Daily.    Blanche Neil MD   lisinopril (PRINIVIL,ZESTRIL) 20 MG tablet Take 20 mg by mouth 2 (two) times a day.    Blanche Neil MD   nicotine (NICODERM CQ) 14 MG/24HR patch Place 1 patch on the skin as directed by provider Daily. Take off it pt smoke. 8/21/21    "Narinder Madrid MD   NIFEdipine XL (PROCARDIA XL) 60 MG 24 hr tablet Take 60 mg by mouth 2 (two) times a day.    Blanche Neil MD   ondansetron (ZOFRAN) 4 MG tablet Take 4 mg by mouth Every 8 (Eight) Hours As Needed for nausea or vomiting.    Blanche Neil MD   pregabalin (LYRICA) 75 MG capsule Take 75 mg by mouth 2 (Two) Times a Day.    Blanche Neil MD   rOPINIRole (REQUIP) 4 MG tablet Take 4 mg by mouth Every Night.    Blanche Neil MD   senna (SENOKOT) 8.6 MG tablet tablet Take 1 tablet by mouth Every Night.    Blanche Neil MD   simvastatin (ZOCOR) 20 MG tablet Take 20 mg by mouth Daily.    Blanche Neil MD I have utilized all available immediate resources to obtain, update, and review the patient's current medications.    Objective     Vital Signs: /65   Pulse 55   Temp 97.9 °F (36.6 °C) (Axillary)   Resp 18   Ht 165.1 cm (65\")   Wt 76 kg (167 lb 8.8 oz)   LMP  (LMP Unknown)   SpO2 97%   BMI 27.88 kg/m²   Physical Exam  Vitals reviewed.   Constitutional:       Appearance: She is ill-appearing.      Comments: Chronically ill appearing.    HENT:      Head: Normocephalic and atraumatic.      Nose: Nose normal.      Mouth/Throat:      Mouth: Mucous membranes are dry.      Pharynx: No oropharyngeal exudate.   Eyes:      General: No scleral icterus.     Conjunctiva/sclera: Conjunctivae normal.   Cardiovascular:      Rate and Rhythm: Normal rate and regular rhythm.      Heart sounds: Murmur heard.     Pulmonary:      Effort: Pulmonary effort is normal.      Breath sounds: Normal breath sounds.   Abdominal:      General: There is no distension.      Palpations: Abdomen is soft.   Musculoskeletal:         General: No swelling or tenderness.      Cervical back: Normal range of motion and neck supple.      Comments: Left index finger blood.    Skin:     General: Skin is warm and dry.   Neurological:      General: No focal deficit present.      Mental " Status: She is alert.      Cranial Nerves: No cranial nerve deficit.      Motor: Weakness present.   Psychiatric:      Comments: Flat affect. Asking for pain medications.        Results Reviewed:  Lab Results (last 24 hours)     Procedure Component Value Units Date/Time    POC Glucose Once [[id number]]  (Abnormal) Collected: 09/11/21 0343    Specimen: Blood Updated: 09/11/21 0354     Glucose 140 mg/dL      Comment: : [id number] Trupti AlmodovarMeter ID: [id number]       Troponin [[id number]]  (Abnormal) Collected: 09/11/21 0218    Specimen: Blood Updated: 09/11/21 0309     Troponin T 0.149 ng/mL     Narrative:      Troponin T Reference Range:  <= 0.03 ng/mL-   Negative for AMI  >0.03 ng/mL-     Abnormal for myocardial necrosis.  Clinicians would have to utilize clinical acumen, EKG, Troponin and serial changes to determine if it is an Acute Myocardial Infarction or myocardial injury due to an underlying chronic condition.       Results may be falsely decreased if patient taking Biotin.      POC Glucose Once [[id number]]  (Abnormal) Collected: 09/11/21 0234    Specimen: Blood Updated: 09/11/21 0245     Glucose 176 mg/dL      Comment: : [id number] Jesse DaleMeter ID: [id number]       [place] Draw [[id number]] Collected: 09/11/21 0033    Specimen: Blood Updated: 09/11/21 0145    Narrative:      The following orders were created for panel order [place] Draw.  Procedure                               Abnormality         Status                     ---------                               -----------         ------                     Green Top (Gel)[[id number]]                                  Final result               Lavender Top[[id number]]                                     Final result               Red Top[[id number]]                                          Final result               Light Blue Top[[id number]]                                   Final result                 Please view results for these tests on  the individual orders.    Green Top (Gel) [[id number]] Collected: 09/11/21 0033    Specimen: Blood Updated: 09/11/21 0145     Extra Tube Hold for add-ons.     Comment: Auto resulted.       Red Top [[id number]] Collected: 09/11/21 0033    Specimen: Blood Updated: 09/11/21 0145     Extra Tube Hold for add-ons.     Comment: Auto resulted.       Lavender Top [[id number]] Collected: 09/11/21 0033    Specimen: Blood Updated: 09/11/21 0145     Extra Tube hold for add-on     Comment: Auto resulted       Light Blue Top [[id number]] Collected: 09/11/21 0036    Specimen: Blood Updated: 09/11/21 0145     Extra Tube hold for add-on     Comment: Auto resulted       Urine Drug Screen - Urine, Catheter [[id number]]  (Normal) Collected: 09/11/21 0108    Specimen: Urine, Catheter Updated: 09/11/21 0138     THC, Screen, Urine Negative     Phencyclidine (PCP), Urine Negative     Cocaine Screen, Urine Negative     Methamphetamine, Ur Negative     Opiate Screen Negative     Amphetamine Screen, Urine Negative     Benzodiazepine Screen, Urine Negative     Tricyclic Antidepressants Screen Negative     Methadone Screen, Urine Negative     Barbiturates Screen, Urine Negative     Oxycodone Screen, Urine Negative     Propoxyphene Screen Negative     Buprenorphine, Screen, Urine Negative    Narrative:      Cutoff For Drugs Screened:    Amphetamines               500 ng/ml  Barbiturates               200 ng/ml  Benzodiazepines            150 ng/ml  Cocaine                    150 ng/ml  Methadone                  200 ng/ml  Opiates                    100 ng/ml  Phencyclidine               25 ng/ml  THC                            50 ng/ml  Methamphetamine            500 ng/ml  Tricyclic Antidepressants  300 ng/ml  Oxycodone                  100 ng/ml  Propoxyphene               300 ng/ml  Buprenorphine               10 ng/ml    The normal value for all drugs tested is negative. This report includes unconfirmed screening results, with the cutoff values  listed, to be used for medical treatment purposes only.  Unconfirmed results must not be used for non-medical purposes such as employment or legal testing.  Clinical consideration should be applied to any drug of abuse test, particularly when unconfirmed results are used.      Troponin [[id number]]  (Abnormal) Collected: 09/11/21 0033    Specimen: Blood Updated: 09/11/21 0134     Troponin T 0.169 ng/mL     Narrative:      Troponin T Reference Range:  <= 0.03 ng/mL-   Negative for AMI  >0.03 ng/mL-     Abnormal for myocardial necrosis.  Clinicians would have to utilize clinical acumen, EKG, Troponin and serial changes to determine if it is an Acute Myocardial Infarction or myocardial injury due to an underlying chronic condition.       Results may be falsely decreased if patient taking Biotin.      Comprehensive Metabolic Panel [[id number]]  (Abnormal) Collected: 09/11/21 0033    Specimen: Blood Updated: 09/11/21 0133     Glucose 40 mg/dL      BUN 34 mg/dL      Creatinine 3.98 mg/dL      Sodium 141 mmol/L      Potassium 2.3 mmol/L      Chloride 96 mmol/L      CO2 27.0 mmol/L      Calcium 9.2 mg/dL      Total Protein 7.1 g/dL      Albumin 3.80 g/dL      ALT (SGPT) 9 U/L      AST (SGOT) 15 U/L      Alkaline Phosphatase 125 U/L      Total Bilirubin 0.3 mg/dL      eGFR Non African Amer 12 mL/min/1.73      Comment: <15 Indicative of kidney failure.        eGFR   Amer --     Comment: <15 Indicative of kidney failure.        Globulin 3.3 gm/dL      A/G Ratio 1.2 g/dL      BUN/Creatinine Ratio 8.5     Anion Gap 18.0 mmol/L     Narrative:      GFR Normal >60  Chronic Kidney Disease <60  Kidney Failure <15      Urinalysis, Microscopic Only - Urine, Catheter In/Out [[id number]]  (Abnormal) Collected: 09/11/21 0108    Specimen: Urine, Catheter In/Out Updated: 09/11/21 0131     RBC, UA 3-5 /HPF      WBC, UA 0-2 /HPF      Bacteria, UA None Seen /HPF      Squamous Epithelial Cells, UA 0-2 /HPF      Hyaline Casts, UA 0-2 /LPF       Methodology Automated Microscopy    Urinalysis With Culture If Indicated - Urine, Catheter In/Out [[id number]]  (Abnormal) Collected: 09/11/21 0108    Specimen: Urine, Catheter In/Out Updated: 09/11/21 0131     Color, UA Yellow     Appearance, UA Clear     pH, UA 7.0     Specific Gravity, UA 1.017     Glucose, UA >=1000 mg/dL (3+)     Ketones, UA Negative     Bilirubin, UA Negative     Blood, UA Negative     Protein,  mg/dL (2+)     Leuk Esterase, UA Negative     Nitrite, UA Negative     Urobilinogen, UA 0.2 E.U./dL    COVID-19,[facility] IN-HOUSE,Nasal Swab No Transport Media 3-4 HR TAT - Swab, Nasal Cavity [[id number]]  (Normal) Collected: 09/11/21 0040    Specimen: Swab from Nasal Cavity Updated: 09/11/21 0129     COVID19 Not Detected    Narrative:      Fact sheet for providers: https://www.fda.gov/media/737887/download     Fact sheet for patients: https://www.fda.gov/media/701667/download    Test performed by PCR.    Consider negative results in combination with clinical observations, patient history, and epidemiological information.    POC Glucose Once [[id number]]  (Abnormal) Collected: 09/11/21 0116    Specimen: Blood Updated: 09/11/21 0127     Glucose 150 mg/dL      Comment: : [id number] Trupti KaitlinMeter ID: [id number]       D-dimer, Quantitative [[id number]]  (Abnormal) Collected: 09/11/21 0036    Specimen: Blood Updated: 09/11/21 0120     D-Dimer, Quantitative 1.22 mg/L (FEU)     Narrative:      Reference Range is 0-0.50 mg/L FEU. However, results <0.50 mg/L FEU tends to rule out DVT or PE. Results >0.50 mg/L FEU are not useful in predicting absence or presence of DVT or PE.      aPTT [[id number]]  (Normal) Collected: 09/11/21 0036    Specimen: Blood Updated: 09/11/21 0120     PTT 30.9 seconds     Protime-INR [[id number]]  (Normal) Collected: 09/11/21 0036    Specimen: Blood Updated: 09/11/21 0120     Protime 13.2 Seconds      INR 1.04    Blood Gas, Venous - [[id number]]  (Abnormal) Collected:  09/11/21 0108    Specimen: Venous Blood Updated: 09/11/21 0117     Site OTHER     pH, Venous 7.398 pH Units      pCO2, Venous 50.3 mm Hg      pO2, Venous 49.0 mm Hg      HCO3, Venous 31.0 mmol/L      Comment: 83 Value above reference range        Base Excess, Venous 5.4 mmol/L      Comment: 83 Value above reference range        O2 Saturation, Venous 83.3 %      Comment: 83 Value above reference range        Temperature 37.0 C      Barometric Pressure for Blood Gas 752 mmHg      Modality Room Air     Ventilator Mode NA     Collected by [id number]     Comment: Meter: [id number]     :  [id number]       Ammonia [[id number]]  (Normal) Collected: 09/11/21 0033    Specimen: Blood Updated: 09/11/21 0116     Ammonia 13 umol/L     Ethanol [[id number]] Collected: 09/11/21 0033    Specimen: Blood Updated: 09/11/21 0115     Ethanol % <0.010 %     Narrative:      Not for legal purposes. Chain of Custody not followed.     Magnesium [[id number]]  (Normal) Collected: 09/11/21 0033    Specimen: Blood Updated: 09/11/21 0114     Magnesium 2.4 mg/dL     POC Glucose Once [[id number]]  (Abnormal) Collected: 09/11/21 0056    Specimen: Blood Updated: 09/11/21 0107     Glucose 41 mg/dL      Comment: : [id number] Ray JamiMeter ID: [id number]       CBC & Differential [[id number]]  (Abnormal) Collected: 09/11/21 0033    Specimen: Blood Updated: 09/11/21 0049    Narrative:      The following orders were created for panel order CBC & Differential.  Procedure                               Abnormality         Status                     ---------                               -----------         ------                     CBC Auto Differential[[id number]]        Abnormal            Final result                 Please view results for these tests on the individual orders.    CBC Auto Differential [[id number]]  (Abnormal) Collected: 09/11/21 0033    Specimen: Blood Updated: 09/11/21 0049     WBC 8.75 10*3/mm3      RBC 2.79 10*6/mm3       Hemoglobin 8.2 g/dL      Hematocrit 25.8 %      MCV 92.5 fL      MCH 29.4 pg      MCHC 31.8 g/dL      RDW 16.1 %      RDW-SD 54.0 fl      MPV 9.7 fL      Platelets 217 10*3/mm3      Neutrophil % 68.8 %      Lymphocyte % 15.3 %      Monocyte % 11.4 %      Eosinophil % 3.2 %      Basophil % 1.1 %      Immature Grans % 0.2 %      Neutrophils, Absolute 6.01 10*3/mm3      Lymphocytes, Absolute 1.34 10*3/mm3      Monocytes, Absolute 1.00 10*3/mm3      Eosinophils, Absolute 0.28 10*3/mm3      Basophils, Absolute 0.10 10*3/mm3      Immature Grans, Absolute 0.02 10*3/mm3      nRBC 0.0 /100 WBC     POC Glucose Once [[id number]]  (Normal) Collected: 09/11/21 0003    Specimen: Blood Updated: 09/11/21 0014     Glucose 81 mg/dL      Comment: : [id number] [facility] ID: [id number]           Imaging Results (Last 24 Hours)     Procedure Component Value Units Date/Time    CT Head Without Contrast [[id number]] Resulted: 09/11/21 0121     Updated: 09/11/21 0257    XR Chest 1 View [[id number]] Resulted: 09/11/21 0222     Updated: 09/11/21 0223    XR Chest 1 View [[id number]] Resulted: 09/11/21 0054     Updated: 09/11/21 0056        I have personally reviewed and interpreted the radiology studies and ECG obtained at time of admission.     Assessment / Plan     Assessment:   Active Hospital Problems    Diagnosis    • Altered mental status      Impression:  1. AMS  2. CKD on dialysis  3. Hypoglycemia in a NIDDM  4. Chronic anemia  5. Hypokalemia  6. Elevated troponin and EKG changes    Plan:   1.  Neuro checks, patient appears to have improved on my examination  2.  Nephrology consult, patient a chronic dialysis patient  3.  Labs in the morning  4.  Hold blood pressure medicines, as the patient has marginal blood pressure  5.  Trend cardiac markers with EKG in the morning  6.  Consult cardiology  7.  Fall precautions      Code Status/Advanced Care Plan: Full    The patient's surrogate decision maker is family.     I discussed my  findings and recommendations with the patient.    Estimated length of stay is 2 to 3 days.     The patient was seen and examined by me on 9/11/2021 at 4.    Electronically signed by Seth Noriega DO, 09/11/21, 04:11 CDT.                Electronically signed by Seth Noriega DO at 09/11/21 0420          Emergency Department Notes      Naun Fields MD at 09/11/21 0013      Procedure Orders    1. Central Line At Bedside [[id number]] ordered by Naun Fields MD               Subjective   Patient was brought to the ER with altered mental status as per the EMS she had taken uknown  amount of insulin and her blood sugar was very low initially.  This was not a suicidal attempt e blood sugar monitor was not registering the blood sugar level and therefore she was given some glucose.  She was brought to the ER for evaluation the patient appears confused and lethargic at this time.  She is on continuous pain medications.  Does not any pain pump.  Work-up is going performed on her.  Pulmonary use Narcan on her      History provided by:  EMS personnel  History limited by:  Mental status change  Altered Mental Status  Presenting symptoms: confusion, lethargy and partial responsiveness    Severity:  Moderate  Most recent episode:  Today  Episode history:  Single  Timing:  Constant  Progression:  Unchanged  Chronicity:  New  Context: not taking medications as prescribed    Context: not alcohol use, not dementia, not drug use, not head injury, not homeless, not nursing home resident, not recent change in medication, not recent illness and not recent infection    Associated symptoms: nausea and weakness    Associated symptoms: no abdominal pain, normal movement, no agitation, no decreased appetite, no depression, no difficulty breathing, no fever, no hallucinations, no seizures, no slurred speech, no suicidal behavior and no visual change    Hypoglycemia  Associated symptoms: altered mental status and weakness     Associated symptoms: no seizures and no visual change        Review of Systems   Unable to perform ROS: Mental status change   Constitutional: Negative for decreased appetite and fever.   Gastrointestinal: Positive for nausea. Negative for abdominal pain.   Neurological: Positive for weakness. Negative for seizures.   Psychiatric/Behavioral: Positive for confusion. Negative for agitation and hallucinations.       Past Medical History:   Diagnosis Date   • Anxiety    • Arthritis    • Chronic kidney disease     STAGE V RENAL FAILURE   • Depression    • Diabetes mellitus (CMS/HCC)    • HTN (hypertension)    • Hypothyroidism    • Obesity    • Tremors of nervous system        Allergies   Allergen Reactions   • Penicillins Hives and Shortness Of Breath   • Lamisil [Terbinafine]    • Reglan [Metoclopramide] GI Intolerance   • Stadol [Butorphanol] Nausea And Vomiting       Past Surgical History:   Procedure Laterality Date   • ARTERIOVENOUS FISTULA     • BREAST BIOPSY     • CARPAL TUNNEL RELEASE     • CHOLECYSTECTOMY     • TUBAL ABDOMINAL LIGATION         Family History   Problem Relation Age of Onset   • Cancer Other    • Hypertension Other    • Kidney disease Other    • Heart disease Other    • Diabetes Other    • Diabetes Mother    • Diabetes Maternal Aunt    • Diabetes Maternal Grandmother        Social History     Socioeconomic History   • Marital status:      Spouse name: Not on file   • Number of children: Not on file   • Years of education: Not on file   • Highest education level: Not on file   Tobacco Use   • Smoking status: Current Every Day Smoker     Packs/day: 0.50   • Smokeless tobacco: Never Used   • Tobacco comment: CUTTING BACK AND TRYING TO STOP SMOKING   Substance and Sexual Activity   • Alcohol use: No   • Drug use: No   • Sexual activity: Defer           Objective   Physical Exam  Vitals and nursing note reviewed. Exam conducted with a chaperone present.   Constitutional:       General: She is  not in acute distress.     Appearance: She is overweight. She is ill-appearing. She is not diaphoretic.   HENT:      Head: Normocephalic and atraumatic.      Nose: Nose normal.      Mouth/Throat:      Mouth: Mucous membranes are moist.      Pharynx: Oropharynx is clear.   Eyes:      General: No visual field deficit or scleral icterus.     Extraocular Movements: Extraocular movements intact.      Conjunctiva/sclera: Conjunctivae normal.      Pupils: Pupils are equal, round, and reactive to light.   Neck:      Vascular: No carotid bruit.   Cardiovascular:      Rate and Rhythm: Normal rate and regular rhythm.      Pulses: Normal pulses.      Heart sounds: No murmur heard.   No friction rub.   Pulmonary:      Effort: Pulmonary effort is normal. No respiratory distress.      Breath sounds: No stridor. Decreased breath sounds present. No wheezing.   Abdominal:      General: Abdomen is flat. Bowel sounds are normal. There is no distension.      Palpations: There is no mass.      Tenderness: There is no abdominal tenderness.   Musculoskeletal:         General: No swelling or tenderness. Normal range of motion.      Cervical back: Normal range of motion and neck supple. No rigidity. No muscular tenderness.   Lymphadenopathy:      Cervical: No cervical adenopathy.   Skin:     General: Skin is warm.      Capillary Refill: Capillary refill takes less than 2 seconds.      Coloration: Skin is not jaundiced or pale.      Findings: No bruising or rash.   Neurological:      General: No focal deficit present.      Mental Status: She is lethargic, disoriented and confused.      GCS: GCS eye subscore is 4. GCS verbal subscore is 5. GCS motor subscore is 5.      Cranial Nerves: Cranial nerves are intact. No cranial nerve deficit, dysarthria or facial asymmetry.      Sensory: Sensation is intact.      Motor: Motor function is intact. No weakness, abnormal muscle tone, seizure activity or pronator drift.      Deep Tendon Reflexes:  Reflexes are normal and symmetric. Babinski sign absent on the right side. Babinski sign absent on the left side.      Reflex Scores:       Bicep reflexes are 2+ on the right side and 2+ on the left side.       Patellar reflexes are 2+ on the right side and 2+ on the left side.     Comments: Moving all 4 extremities nonlateralizing   Psychiatric:         Attention and Perception: She is inattentive.         Mood and Affect: Mood normal. Affect is blunt.         Speech: Speech is delayed.         Behavior: Behavior normal.         Thought Content: Thought content does not include homicidal or suicidal ideation. Thought content does not include homicidal or suicidal plan.         Central Line At Bedside    Date/Time: 9/11/2021 2:26 AM  Performed by: Naun Fields MD  Authorized by: Naun Fields MD     Consent:     Consent obtained:  Emergent situation    Consent given by:  Patient    Risks discussed:  Arterial puncture, bleeding, infection, incorrect placement, nerve damage and pneumothorax    Alternatives discussed:  Delayed treatment  Pre-procedure details:     Hand hygiene: Hand hygiene performed prior to insertion      Sterile barrier technique: All elements of maximal sterile technique followed      Skin preparation:  2% chlorhexidine    Skin preparation agent: Skin preparation agent completely dried prior to procedure    Anesthesia (see MAR for exact dosages):     Anesthesia method:  Local infiltration    Local anesthetic:  Lidocaine 1% w/o epi  Procedure details:     Location:  R internal jugular    Patient position:  Flat    Procedural supplies:  Triple lumen    Catheter size:  8 Fr    Landmarks identified: yes      Ultrasound guidance: yes      Sterile ultrasound techniques: Sterile gel and sterile probe covers were used      Number of attempts:  1    Successful placement: yes    Post-procedure details:     Post-procedure:  Dressing applied    Assessment:  Blood return through all ports, free fluid flow,  placement verified by x-ray and no pneumothorax on x-ray    Patient tolerance of procedure:  Tolerated well, no immediate complications              ED Course  ED Course as of Sep 11 0331   Sat Sep 11, 2021   0012 EKG shows normal sinus rhythm with nonspecific ST-T wave changes with T wave inversion in the high lateral leads which are new compared to the prior EKG  Patient denies any chest pain or shortness of breath she had a prolonged QT    [TS]   0225 Patient has evidence of mild pulmonary congestion on her chest x-ray central line placement appears good there is no pneumothorax.    [TS]   0233 Patient has T wave inversion in the high lateral leads however more prominent right now than before she denies any chest pain cardiac markers elevated but I think is type II elevation    [TS]   0239 Her blood pressure has improved to 102 systolic she is not tachycardic afebrile sats are normal    [TS]   0324 Because of poor IV access central line was placed by me the patient initially was placed on a D10 drip and Narcan was given to her even with the D10 drip her blood sugar dropped therefore she was given glucagon and D50 amp.  The D10 drip was continued.  Currently she is awake and answering questions.  Her encephalopathy could be secondary to hypoglycemia narcotics or uremia or a mix and match of all 3.  She has some mild metabolic acidosis secondary to her uremia her EKG is abnormal especially in the high lateral leads the troponin I elevation is probably type II the patient is not any chest pain.  We have been trying to replace her potassium and the patient will be admitted to the hospital service for further evaluation and treatment of her condition.    [TS]   0327 Pending also reports a CT scan and chest x-rays    [TS]      ED Course User Index  [TS] Naun Fielsd MD                                           MDM  Number of Diagnoses or Management Options  Diagnosis management comments: Differential Diagnosis:  I  considered toxic-metabolic etiology, hypoglycemia, hyperglycemia, diabetic ketoacidosis, drug overdose, ethanol intoxication, thiamine deficiency, hypothermia, hyponatremia, hypernatremia, organ failure, liver failure, kidney failure, thyroid failure, adrenal failure, hypoxia, hypercarbia, ischemic stroke, intracranial bleed, subarachnoid hemorrhage, closed head injury, subdural hematoma, seizure activity, syncopal episode, infectious etiology, hypertensive encephalopathy, vasculitis, thrombotic thrombocytopenic purpura and disseminated intravascular coagulation as a possible cause of altered mental status in this patient. This is a partial list of diagnoses considered.              Amount and/or Complexity of Data Reviewed  Clinical lab tests: ordered  Tests in the radiology section of CPT®: ordered and reviewed  Tests in the medicine section of CPT®: reviewed and ordered  Decide to obtain previous medical records or to obtain history from someone other than the patient: yes    Risk of Complications, Morbidity, and/or Mortality  Presenting problems: moderate  Diagnostic procedures: moderate  Management options: moderate        Final diagnoses:   Altered mental status, unspecified altered mental status type   Encephalopathy   Uremia   Hypoglycemia   Metabolic acidosis   Troponin level elevated   Chronic narcotic use       ED Disposition  ED Disposition     ED Disposition Condition Comment    Decision to Admit  Level of Care: Critical Care [6]   Diagnosis: Altered mental status, unspecified altered mental status type [[id number]]   Admitting Physician: THOMAS MATHEWS [[id number]]   Attending Physician: THOMAS MATHEWS [[id number]]   Certification: I Certify That Inpatient Hospital Services Are Medically Necessary For Greater Than 2 Midnights            No follow-up provider specified.       Medication List      No changes were made to your prescriptions during this visit.          Naun Fields MD  09/11/21  0331      Electronically signed by Naun Fields MD at 09/11/21 0331     Nishi Lylse, RN at 09/11/21 0649        Spoke to dr li on the phone regarding patient's hb going from 8.2 to 7.3 overnight. Dr li does not want to give any blood until dialysis is done today. Informed dr li that potassium is 2.9, verbal order of 20meq of potassium put in. Magnesium lab verbal order placed. Dr li informed that patient has been asking for pain medication and that patient is NPO. No further orders for patient.     Nishi Lyles, RN  09/11/21 0650      Electronically signed by Nishi Lyles RN at 09/11/21 0650       Vital Signs (last day)     Date/Time   Temp   Temp src   Pulse   Resp   BP   Patient Position   SpO2    09/11/21 1500   99 (37.2)   Oral   72   16   162/70   Lying   97    09/11/21 1355   --   --   72   --   --   --   100    09/11/21 1339   --   --   72   --   --   --   95    09/11/21 1331   --   --   71   --   180/79   --   98    09/11/21 1301   --   --   71   --   169/82   --   100    09/11/21 1231   --   --   70   --   174/81   --   100    09/11/21 1201   --   --   68   --   156/91   --   100    09/11/21 1131   --   --   64   --   148/80   --   100    09/11/21 1125   --   --   64   --   --   --   98    09/11/21 1117   --   --   66   --   --   --   100    09/11/21 1113   --   --   67   --   --   --   99    09/11/21 1107   --   --   65   --   --   --   97    09/11/21 1101   --   --   65   --   178/79   --   99    09/11/21 1031   --   --   66   --   164/80   --   97    09/11/21 1022   --   --   64   --   --   --   99    09/11/21 1001   --   --   64   --   169/78   --   97    09/11/21 0931   --   --   63   --   158/78   --   100    09/11/21 0901   --   --   65   --   159/80   --   100 09/11/21 0831   --   --   62   --   161/79   --   100    09/11/21 0801   --   --   61   --   139/75   --   100    09/11/21 0755   --   --   61   --   --   --   99    09/11/21 0731   --   --   60   --   150/72    --   100    09/11/21 0716   --   --   60   --   --   --   97    09/11/21 0631   --   --   58   --   135/67   --   100    09/11/21 0601   --   --   56   --   120/63   --   98    09/11/21 0501   --   --   56   --   131/68   --   98    09/11/21 0431   --   --   56   --   118/62   --   98    09/11/21 0416   --   --   56   --   120/63   --   99    09/11/21 0401   --   --   55   --   111/64   --   97    09/11/21 0346   --   --   --   --   101/65   --   --    09/11/21 0345   --   --   55   --   --   --   97    09/11/21 0231   --   --   56   --   97/54   --   99    09/11/21 0147   --   --   57   --   97/55   --   96    09/11/21 0116   --   --   62   --   101/57   --   --    09/11/21 0107   97.9 (36.6)   Axillary   --   --   --   --   --    09/11/21 0000   --   --   67   18   106/71   Sitting   98              Oxygen Therapy (last day)     Date/Time   SpO2   Device (Oxygen Therapy)   Flow (L/min)   Oxygen Concentration (%)   ETCO2 (mmHg)    09/11/21 1500   97   --   --   --   --    09/11/21 1355   100   --   --   --   --    09/11/21 1339   95   --   --   --   --    09/11/21 1331   98   --   --   --   --    09/11/21 1301   100   --   --   --   --    09/11/21 1231   100   --   --   --   --    09/11/21 1201   100   --   --   --   --    09/11/21 1131   100   --   --   --   --    09/11/21 1125   98   --   --   --   --    09/11/21 1117   100   --   --   --   --    09/11/21 1113   99   --   --   --   --    09/11/21 1107   97   --   --   --   --    09/11/21 1101   99   --   --   --   --    09/11/21 1031   97   --   --   --   --    09/11/21 1022   99   --   --   --   --    09/11/21 1001   97   --   --   --   --    09/11/21 0931   100   --   --   --   --    09/11/21 0901   100   --   --   --   --    09/11/21 0831   100   --   --   --   --    09/11/21 0801   100   --   --   --   --    09/11/21 0755   99   --   --   --   --    09/11/21 0731   100   --   --   --   --    09/11/21 0716   97   --   --   --   --    09/11/21 0631   100   --    --   --   --    09/11/21 0601   98   --   --   --   --    09/11/21 0501   98   --   --   --   --    09/11/21 0431   98   --   --   --   --    09/11/21 0416   99   --   --   --   --    09/11/21 0401   97   --   --   --   --    09/11/21 0345   97   --   --   --   --    09/11/21 0231   99   --   --   --   --    09/11/21 0147   96   --   --   --   --    09/11/21 0000   98   room air   --   --   --                Current Facility-Administered Medications   Medication Dose Route Frequency Provider Last Rate Last Admin   • acetaminophen (TYLENOL) tablet 650 mg  650 mg Oral Q4H PRN Seth Noriega DO       • atorvastatin (LIPITOR) tablet 10 mg  10 mg Oral Daily Seth Noriega DO   10 mg at 09/11/21 0843   • carvedilol (COREG) tablet 12.5 mg  12.5 mg Oral BID With Meals Seth Noriega DO   12.5 mg at 09/11/21 1113   • dextrose (D50W) 25 g/ 50mL Intravenous Solution 25 g  25 g Intravenous Q15 Min PRN Adán Merrill DO       • dextrose (GLUTOSE) oral gel 15 g  15 g Oral Q15 Min PRN Adán Merrill DO       • DULoxetine (CYMBALTA) DR capsule 60 mg  60 mg Oral Daily Seth Noriega DO   60 mg at 09/11/21 0843   • famotidine (PEPCID) tablet 20 mg  20 mg Oral Daily Seth Noriega DO   20 mg at 09/11/21 0843   • glucagon (human recombinant) (GLUCAGEN DIAGNOSTIC) injection 1 mg  1 mg Subcutaneous Q15 Min PRN Adán Merrill DO       • insulin lispro (humaLOG) injection 2-7 Units  2-7 Units Subcutaneous TID AC Adán Merrill DO       • lactulose solution 20 g  20 g Oral TID Seth Noriega DO       • levothyroxine (SYNTHROID, LEVOTHROID) tablet 25 mcg  25 mcg Oral Q AM Seth Noriega DO   25 mcg at 09/11/21 0844   • lisinopril (PRINIVIL,ZESTRIL) tablet 20 mg  20 mg Oral Q12H Adán Merrill, DO       • nicotine (NICODERM CQ) 14 MG/24HR patch 1 patch  1 patch Transdermal Q24H Seth Noriega,    1 patch at 09/11/21 0843   • NIFEdipine XL (PROCARDIA XL) 24 hr tablet 60 mg  60 mg Oral  BID Adán Merrill,        • ondansetron (ZOFRAN) injection 4 mg  4 mg Intravenous Q6H PRN Seth Noriega DO       • pregabalin (LYRICA) capsule 75 mg  75 mg Oral Q12H Seth Noriega DO   75 mg at 09/11/21 1112   • rOPINIRole (REQUIP) tablet 4 mg  4 mg Oral Nightly Seth Noriega DO       • sodium chloride 0.9 % flush 10 mL  10 mL Intravenous PRN Naun Fields MD       • sodium chloride 0.9 % flush 10 mL  10 mL Intravenous PRN Naun Fields MD       • sodium chloride 0.9 % flush 10 mL  10 mL Intravenous Q12H Seth Noriega DO       • sodium chloride 0.9 % flush 10 mL  10 mL Intravenous PRN Seth Noriega DO         Orders (last 24 hrs)      Start     Ordered    09/12/21 0600  CBC (No Diff)  Morning Draw      09/11/21 0822 09/12/21 0600  Basic Metabolic Panel  Morning Draw      09/11/21 0822    09/12/21 0600  Hemoglobin A1c  Morning Draw      09/11/21 1422    09/12/21 0500  ECG 12 Lead  Tomorrow AM      09/11/21 0504    09/11/21 2100  rOPINIRole (REQUIP) tablet 4 mg  Nightly      09/11/21 0504    09/11/21 2100  NIFEdipine XL (PROCARDIA XL) 24 hr tablet 60 mg  2 Times Daily      09/11/21 1425    09/11/21 1730  insulin lispro (humaLOG) injection 2-7 Units  3 Times Daily Before Meals      09/11/21 1422    09/11/21 1700  POC Glucose 4x Daily AC & at Bedtime  4 Times Daily Before Meals & at Bedtime     Comments: If bedtime blood glucose is greater than 350 mg/dl, call MD.      09/11/21 1422    09/11/21 1617  POC Glucose Once  Once      09/11/21 1555    09/11/21 1613  Inpatient Case Management  Consult  Once     Provider:  (Not yet assigned)    09/11/21 1613    09/11/21 1613  Inpatient Diabetes Educator Consult  Once     Provider:  (Not yet assigned)    09/11/21 1613    09/11/21 1443  POC Glucose Once  Once      09/11/21 1415    09/11/21 1427  lisinopril (PRINIVIL,ZESTRIL) tablet 20 mg  Every 12 Hours Scheduled      09/11/21 1425    09/11/21 1422  Diet Regular; Thin;  Cardiac, Consistent Carbohydrate, Renal  Diet Effective Now      09/11/21 1422    09/11/21 1421  Do NOT Hold Basal or Correction Scale Insulin When Patient is NPO, Hold Scheduled Mealtime (Bolus) Insulin if NPO  Continuous      09/11/21 1422 09/11/21 1421  Follow [facility] Hypoglycemia Standing Orders For Blood Glucose Less Than 70 mg/dL  Until Discontinued     Comments: ALERT PATIENT - NOT NPO & CAN SAFELY SWALLOW  Administer 4 oz Fruit Juice OR 4 oz Regular Soda OR 8 oz Milk OR 15-30 grams (1 tube) of Glucose Gel.  Recheck Blood Glucose Approximately 15 Minutes After Ingestion, Repeat Treatment & Continue to Recheck Blood Sugar Approximately Every 15 Minutes Until Blood Glucose is 70 or Higher.  Once Blood Glucose is 70 or Higher & if It Will Be More Than 60 Minutes Until Next Meal, Provide Appropriate Snack (Including Carbohydrate Food) Based on Meal Plan Order. Give Meal Tray As Soon As Possible.    PATIENT HAS IV ACCESS - UNRESPONSIVE, NPO OR UNABLE TO SAFELY SWALLOW  Administer 25g (50ml) D50W IV Push.  Recheck Blood Glucose Approximately 15 Minutes After Administration, if Blood Glucose Remains Less Than 70, Repeat Treatment   Recheck Blood Glucose Approximately 15 Minutes After 2nd Administration, if Blood Glucose Remains Less Than 70 After 2nd Dose of D50W, Contact Provider for Further Treatment Orders & Consider Adding IVF With D5W for Maintenance    PATIENT WITHOUT IV ACCESS - UNRESPONSIVE, NPO OR UNABLE TO SAFELY SWALLOW  Administer 1mg Glucagon SQ & Establish IV Access.  Turn Patient on Side - Nausea / Vomiting May Occur.  Recheck Blood Glucose Approximately 15 Minutes After Administration.  If Blood Glucose Remains Less Than 70, Administer 25g D50W IV Push (50ml).  Recheck Blood Glucose Approximately 15 Minutes After Administration of D50W, if Blood Glucose Remains Less Than 70, Contact Provider for Further Treatment Orders & Consider Adding IVF With D5 for Maintenance    Document Event & Patient Response  to Interventions in EMR, Document Medications on MAR  Notify Provider if Hypoglycemia Treatment Needed    21 1422    21 1420  dextrose (GLUTOSE) oral gel 15 g  Every 15 Minutes PRN      21 1422    21 1420  dextrose (D50W) 25 g/ 50mL Intravenous Solution 25 g  Every 15 Minutes PRN      21 1422    21 1420  glucagon (human recombinant) (GLUCAGEN DIAGNOSTIC) injection 1 mg  Every 15 Minutes PRN      21 1422    21 1418  Transfer Patient  Once      21 1417    21 1342  Magnesium  STAT      21 0648    21 1315  POC Glucose Once  Once      21 1247    21 1140  POC Glucose Once  Once      21 1111    21 0955  Troponin  Once,   Status:  Canceled      21 0504    21 0900  DULoxetine (CYMBALTA) DR capsule 60 mg  Daily      21 0504    21 0900  famotidine (PEPCID) tablet 20 mg  Daily      21 0504    21 0900  lactulose solution 20 g  3 Times Daily      21 0504    21 0900  nicotine (NICODERM CQ) 14 MG/24HR patch 1 patch  Every 24 Hours Scheduled      21 0504    21 0900  pregabalin (LYRICA) capsule 75 mg  Every 12 Hours Scheduled      21 0504    21 0900  atorvastatin (LIPITOR) tablet 10 mg  Daily      21 0504    21 0900  sodium chloride 0.9 % flush 10 mL  Every 12 Hours Scheduled      21 0504    21 0836  POC Glucose Once  Once      21 0808    21 0823  SLP Plan of Care Cert / Re-Cert  Once     Comments: Speech Language Pathology Plan of Care  Initial Certification  Certification Period: 2021 - 12/10/2021    Patient Name: Ena Upton  : 1970    (R41.82) Altered mental status, unspecified altered mental status type  (primary encounter diagnosis)  (G93.40) Encephalopathy  (N19) Uremia  (E16.2) Hypoglycemia  (E87.2) Metabolic acidosis  (R77.8) Troponin level elevated  (F11.90) Chronic narcotic use  (R13.10) Dysphagia,  unspecified type                Assessment  SLP Assessment  Prior Level of Function-Communication: WFL  Prior Level of Function-Swallowing: no diet consistency restrictions  SLP Swallowing Diagnosis: swallow WFL  Plan of Care Reviewed With: patient  Swallow Criteria for Skilled Therapeutic Interventions Met: no problems identified which require skilled intervention  Therapy Frequency (Swallow): evaluation only                    Plan    SLP Plan  Therapy Frequency (Swallow): evaluation only        Corazon Alatorre, CCC-SLP  9/11/2021      By cosigning this order, either electronically or physically, I certify that the therapy services are furnished while this patient is under my care, the services outline above are required by this patient, and will be reviewed every 90 days.        M.D.:__________________________________________ Date: ______________    09/11/21 0823    09/11/21 0823  Diet Regular; Thin; Cardiac, Consistent Carbohydrate  Diet Effective Now,   Status:  Canceled      09/11/21 0823    09/11/21 0800  carvedilol (COREG) tablet 12.5 mg  2 Times Daily With Meals      09/11/21 0504    09/11/21 0800  Vital Signs  Every 4 Hours      09/11/21 0504    09/11/21 0800  POC Glucose Finger Q4H  Every 4 Hours      09/11/21 0504    09/11/21 0800  Neuro Checks  Every 4 Hours      09/11/21 0504    09/11/21 0650  potassium chloride 20 mEq in 100 mL IVPB  Once      09/11/21 0648    09/11/21 0611  Type & Screen  STAT      09/11/21 0611    09/11/21 0611  Occult Blood X 1, Stool - Stool, Per Rectum  Once      09/11/21 0611    09/11/21 0600  cloNIDine (CATAPRES) tablet 0.2 mg  Every 8 Hours Scheduled,   Status:  Discontinued      09/11/21 0504    09/11/21 0600  levothyroxine (SYNTHROID, LEVOTHROID) tablet 25 mcg  Every Early Morning      09/11/21 0504    09/11/21 0600  CBC Auto Differential  Morning Draw      09/11/21 0504    09/11/21 0600  Comprehensive Metabolic Panel  Morning Draw      09/11/21 0504    09/11/21 0600   Phosphorus  Morning Draw      09/11/21 0504    09/11/21 0505  Intake & Output  Every Shift      09/11/21 0504    09/11/21 0505  Weigh Patient  Once      09/11/21 0504    09/11/21 0505  Oxygen Therapy- Nasal Cannula; Titrate for SPO2: 90% - 95%  Continuous      09/11/21 0504    09/11/21 0505  Insert Peripheral IV  Once      09/11/21 0504    09/11/21 0505  Saline Lock & Maintain IV Access  Continuous      09/11/21 0504    09/11/21 0505  Place Sequential Compression Device  Once      09/11/21 0504    09/11/21 0505  Maintain Sequential Compression Device  Continuous      09/11/21 0504    09/11/21 0505  Cardiac Monitoring  Continuous      09/11/21 0504    09/11/21 0505  Pulse Oximetry,  Spot  Once      09/11/21 0504    09/11/21 0505  Turn Patient  Now Then Every 2 Hours      09/11/21 0504    09/11/21 0505  Fall Precautions  Continuous      09/11/21 0504    09/11/21 0505  Oral Care  Once      09/11/21 0504    09/11/21 0505  NPO Diet  Diet Effective Now,   Status:  Canceled      09/11/21 0504    09/11/21 0505  Inpatient Nephrology Consult  Once     Specialty:  Nephrology  Provider:  Surjit Avitia MD    09/11/21 0504    09/11/21 0505  Troponin  Now Then Every 2 Hours      09/11/21 0504    09/11/21 0505  Inpatient Cardiology Consult  Once     Specialty:  Cardiology  Provider:  Sriram Ying MD    09/11/21 0504    09/11/21 0504  sodium chloride 0.9 % flush 10 mL  As Needed      09/11/21 0504    09/11/21 0504  ondansetron (ZOFRAN) injection 4 mg  Every 6 Hours PRN      09/11/21 0504    09/11/21 0504  acetaminophen (TYLENOL) tablet 650 mg  Every 4 Hours PRN      09/11/21 0504    09/11/21 0426  SLP Consult: Eval & Treat RN Dysphagia Screen Failed  Once      09/11/21 0427    09/11/21 0355  POC Glucose Once  Once      09/11/21 0343    09/11/21 0354  Code Status and Medical Interventions:  Continuous      09/11/21 0355    09/11/21 0342  POC Glucose STAT  STAT      09/11/21 0341    09/11/21 0332  Inpatient Admission   Once      09/11/21 0331    09/11/21 0246  POC Glucose Once  Once      09/11/21 0234    09/11/21 0233  POC Glucose STAT  STAT      09/11/21 0232    09/11/21 0227  Central Line At Bedside  Once     Comments: This order was created via procedure documentation    09/11/21 0226    09/11/21 0215  XR Chest 1 View  1 Time Imaging      09/11/21 0214    09/11/21 0132  potassium chloride 20 mEq in 100 mL IVPB  Once      09/11/21 0130    09/11/21 0129  Urinalysis, Microscopic Only - Urine, Catheter In/Out  Once      09/11/21 0128    09/11/21 0128  POC Glucose Once  Once      09/11/21 0116    09/11/21 0118  Blood Gas, Venous -  Once      09/11/21 0108    09/11/21 0116  POC Glucose STAT  STAT      09/11/21 0115    09/11/21 0107  POC Glucose Once  Once      09/11/21 0056    09/11/21 0103  glucagon (human recombinant) (GLUCAGEN DIAGNOSTIC) injection 1 mg  Once      09/11/21 0102    09/11/21 0101  dextrose (D50W) 25 g/ 50mL Intravenous Solution 25 g  Once      09/11/21 0059    09/11/21 0100  glucagon (human recombinant) (GLUCAGEN DIAGNOSTIC) injection 1 mg  Once,   Status:  Discontinued      09/11/21 0058    09/11/21 0019  Blood Gas, Venous -  Once      09/11/21 0018    09/11/21 0015  POC Glucose Once  Once      09/11/21 0003    09/11/21 0014  glucagon (human recombinant) (GLUCAGEN DIAGNOSTIC) injection 2 mg  Once,   Status:  Discontinued      09/11/21 0012    09/11/21 0014  ondansetron (ZOFRAN) injection 4 mg  Once,   Status:  Discontinued      09/11/21 0012    09/11/21 0014  aspirin chewable tablet 324 mg  Once,   Status:  Discontinued      09/11/21 0012    09/11/21 0014  dextrose 10 % infusion  Continuous,   Status:  Discontinued      09/11/21 0012    09/11/21 0014  naloxone (NARCAN) injection 0.4 mg  Once      09/11/21 0012    09/11/21 0014  Ammonia  STAT      09/11/21 0013    09/11/21 0012  Insert 2nd peripheral IV  Once      09/11/21 0012    09/11/21 0012  Insert peripheral IV  Once      09/11/21 0012    09/11/21 0011  sodium  chloride 0.9 % flush 10 mL  As Needed      09/11/21 0012    09/11/21 0011  D-dimer, Quantitative  Once      09/11/21 0012    09/11/21 0011  aPTT  Once      09/11/21 0012    09/11/21 0011  Protime-INR  Once      09/11/21 0012    09/11/21 0011  Urinalysis With Culture If Indicated - Urine, Catheter In/Out  Once      09/11/21 0012    09/11/21 0011  Urine Drug Screen - Urine, Catheter  Once      09/11/21 0012    09/11/21 0011  Ethanol  Once      09/11/21 0012    09/11/21 0011  Magnesium  Once      09/11/21 0012    09/11/21 0011  XR Chest 1 View  1 Time Imaging      09/11/21 0012    09/11/21 0011  CT Head Without Contrast  1 Time Imaging      09/11/21 0012    09/11/21 0010  COVID-19,[facility] IN-HOUSE,Nasal Swab No Transport Media 3-4 HR TAT - Swab, Nasal Cavity  Once      09/11/21 0012    09/11/21 0010  Cardiac monitoring  Per Hospital Policy,   Status:  Canceled      09/11/21 0012    09/11/21 0010  Continuous Pulse Oximetry, add oxygen if SaO2 is below 90%  Continuous     Comments: Add oxygen if SaO2 is below 90%.    09/11/21 0012    09/11/21 0010  Vital Signs  Per Hospital Policy      09/11/21 0012    09/11/21 0010  ECG 12 Lead  Now & in 2 Hours      09/11/21 0012    09/11/21 0010  XR Chest 1 View  1 Time Imaging,   Status:  Canceled      09/11/21 0012    09/11/21 0010  Insert peripheral IV  Once      09/11/21 0012    09/11/21 0010  [place] Draw  Once      09/11/21 0012    09/11/21 0010  CBC & Differential  Once      09/11/21 0012    09/11/21 0010  Comprehensive Metabolic Panel  Once      09/11/21 0012    09/11/21 0010  Troponin  Now Then Every 2 Hours      09/11/21 0012    09/11/21 0010  Green Top (Gel)  PROCEDURE ONCE      09/11/21 0012    09/11/21 0010  Lavender Top  PROCEDURE ONCE      09/11/21 0012    09/11/21 0010  Red Top  PROCEDURE ONCE      09/11/21 0012    09/11/21 0010  Light Blue Top  PROCEDURE ONCE      09/11/21 0012    09/11/21 0010  CBC Auto Differential  PROCEDURE ONCE      09/11/21 0012 09/11/21  0009  Oxygen Therapy- Nasal Cannula; 2 LPM; Titrate for SPO2: equal to or greater than, 90%  Continuous PRN,   Status:  Canceled      09/11/21 0012    09/11/21 0009  sodium chloride 0.9 % flush 10 mL  As Needed      09/11/21 0012    09/10/21 2358  ECG 12 Lead  STAT      09/10/21 2358    Unscheduled  ECG 12 Lead  As Needed      09/11/21 0012    Unscheduled  ECG 12 Lead  As Needed      09/11/21 0012    Unscheduled  Up With Assistance  As Needed      09/11/21 0504                Physician Progress Notes (last 72 hours) (Notes from 09/08/21 1625 through 09/11/21 1625)    No notes of this type exist for this encounter.            Consult Notes (last 72 hours) (Notes from 09/08/21 1626 through 09/11/21 1626)      Alejandra Morillo APRN at 09/11/21 1155      Consult Orders    1. Inpatient Cardiology Consult [[id number]] ordered by Seth Noriega DO at 09/11/21 0355                 Patient Care Team:  Yaron Wiggins APRN as PCP - General  Yaron Wiggins APRN as PCP - Family Medicine  Seth Noriega DO  REASON FOR REFERRAL: Elevated troponin  Chief complaint : Altered mental status, weakness    Subjective     Patient is a 51 y.o. female presents to the emergency department with altered mental status.  Reportedly, family member found her at home with decreased responsiveness.  EMS was called and glucose reading would not register on glucometer.  She was treated with D50, and later started on a dextrose infusion in the ER.  She also received Narcan.  She then became alert and oriented.  She was not complaining of chest pain or shortness of breath.  She reportedly gave herself an unknown amount of insulin at home.  The patient tells me she does take insulin at home, although this is not listed on her medication list.  She is a type II diabetic.    The patient tells me she has had chest pain for a few days but she describes this as a mild discomfort with deep breathing that would last a few seconds at a time.   She also tells me she remembers having some brief shortness of breath earlier today that went away after a few minutes.  Overall, she is a poor historian.  She denies palpitations, edema, orthopnea, PND.  She does have ESRD on hemodialysis 3 times per week-Tuesday, Thursday, Saturday.  She states she did complete her most recent dialysis 2 days ago on Thursday.  She tells me she is compliant with her medications.  She does smoke cigarettes.    Aside from hypoglycemia, work-up in the ER also revealed hypokalemia with potassium of 2.3, followed by 2.9.  Troponins were checked and were flat trending-0.169 at the highest.  Chest x-ray and CT of the head were unremarkable.  She received potassium replacement with 40 MEQ total; recheck pending.  She has also received carvedilol, D50, dextrose infusion, glucagon, and Narcan as noted above.  EKGs reveal sinus rhythm, prolonged QTC-up to 583, T wave inversions in the lateral leads as well as V4, with ST depression in 2, aVF, V4, V5, V6--improved on repeat.  June 2021 EKGs also revealed lateral T wave inversions.    She previously underwent cardiac catheterization in March 2020 at [facility].  Review of records revealed she was admitted over there with volume overload having missed dialysis, hypertensive urgency, and had mildly elevated but trending troponins.  Cath revealed nonobstructive coronary artery disease and a normal LVEF.  2D echo on March 2019 at [facility] revealed a normal LVEF as well-see reports below.    Of note, she also tells me she has been having hemoptysis for the past few weeks.  Additionally, she states earlier today she noticed her hand  is very weak on the left.    6/26/2020 cath report:     Procedure     Procedure Type      Diagnostic procedure:DCA, Coronary Angiogram, Left Heart Cath, Left    Ventriculogram, Left Ventricular Pressure Measurement      Peripheral Cath Diagnostic Procedure:RENAL ARTERY INJECTION,BILATER      CARDIAC CATHETERIZATION       Conclusions      Peripheral Procedure Description      Nonobstructive coronary artery disease.    Normal LV systolic function.    Subocclusive left renal artery distally with atretic right renal    vasculature and no dye retention in renal pelvises consistent with    nonfunctional kidneys.      Recommendations      Medical management.      Signatures      ----------------------------------------------------------------    Electronically signed by Miguel Pope MD(Performing Physician) on    06/26/2020 14:31    ----------------------------------------------------------------      3/28/2019 echo report:    Conclusions      Summary    Mitral valve leaflets are mildly thickened with preserved leaflet    mobility.    Trace mitral regurgitation.    Aortic valve leaflets are moderately thickened.    Aortic valve appears to be tricuspid.    Moderate aortic stenosis.    The aortic valve area is 1.61 cm2 with a maximum gradient of 38 mmHg and a    mean gradient of 21 mmHg.    Mild tricuspid regurgitation.    Severely dilated left atrium.    Normal left ventricular size and function.    Mild concentric left ventricular hypertrophy.    Left ventricular ejection fraction is estimated at 57%.    Markedly enlarged right atrium size.    Moderately enlarged right ventricle cavity.    IVC dilated.      Signature      ----------------------------------------------------------------    Electronically signed by Brenardino Olivo MD(Interpreting    physician) on 03/28/2019 04:17 PM         Review of Systems   Review of Systems   Constitutional: Positive for fatigue. Negative for diaphoresis, fever and unexpected weight change.   HENT: Negative for nosebleeds.    Respiratory: Positive for shortness of breath (transient, now resolved ). Negative for apnea, cough, chest tightness and wheezing.         Hemoptysis   Cardiovascular: Positive for chest pain (atypical, with deep breathing x several days ). Negative for palpitations and leg  swelling.   Gastrointestinal: Negative for abdominal distention, nausea and vomiting.   Genitourinary: Negative for hematuria.   Musculoskeletal: Negative for gait problem.   Skin: Negative for color change.   Neurological: Positive for weakness. Negative for dizziness, syncope and light-headedness.   Psychiatric/Behavioral: Positive for confusion.       History  Past Medical History:   Diagnosis Date   • Anxiety    • Arthritis    • Chronic kidney disease     STAGE V RENAL FAILURE   • Depression    • Diabetes mellitus (CMS/HCC)    • HTN (hypertension)    • Hypothyroidism    • Obesity    • Tremors of nervous system      Past Surgical History:   Procedure Laterality Date   • ARTERIOVENOUS FISTULA     • BREAST BIOPSY     • CARPAL TUNNEL RELEASE     • CATH LAB PROCEDURE     • CHOLECYSTECTOMY     • TUBAL ABDOMINAL LIGATION       Family History   Problem Relation Age of Onset   • Cancer Other    • Hypertension Other    • Kidney disease Other    • Heart disease Other    • Diabetes Other    • Diabetes Mother    • Diabetes Maternal Aunt    • Diabetes Maternal Grandmother      Social History     Tobacco Use   • Smoking status: Current Every Day Smoker     Packs/day: 0.50   • Smokeless tobacco: Never Used   • Tobacco comment: CUTTING BACK AND TRYING TO STOP SMOKING   Substance Use Topics   • Alcohol use: No   • Drug use: No     (Not in a hospital admission)      Current Facility-Administered Medications:   •  acetaminophen (TYLENOL) tablet 650 mg, 650 mg, Oral, Q4H PRN, Seth Noriega DO  •  atorvastatin (LIPITOR) tablet 10 mg, 10 mg, Oral, Daily, Seth Noriega DO, 10 mg at 09/11/21 0843  •  carvedilol (COREG) tablet 12.5 mg, 12.5 mg, Oral, BID With Meals, Seth Noriega DO, 12.5 mg at 09/11/21 1113  •  dextrose 10 % infusion, 100 mL/hr, Intravenous, Continuous, Naun Fields MD, Stopped at 09/11/21 1124  •  DULoxetine (CYMBALTA) DR capsule 60 mg, 60 mg, Oral, Daily, Seth Noriega DO, 60 mg at 09/11/21  0843  •  famotidine (PEPCID) tablet 20 mg, 20 mg, Oral, Daily, Seth Noriega DO, 20 mg at 09/11/21 0843  •  lactulose solution 20 g, 20 g, Oral, TID, Seth Noriega DO  •  levothyroxine (SYNTHROID, LEVOTHROID) tablet 25 mcg, 25 mcg, Oral, Q AM, Seth Noriega DO, 25 mcg at 09/11/21 0844  •  nicotine (NICODERM CQ) 14 MG/24HR patch 1 patch, 1 patch, Transdermal, Q24H, Seth Noriega DO, 1 patch at 09/11/21 0843  •  ondansetron (ZOFRAN) injection 4 mg, 4 mg, Intravenous, Q6H PRN, Seth Noriega DO  •  pregabalin (LYRICA) capsule 75 mg, 75 mg, Oral, Q12H, Seth Noriega DO, 75 mg at 09/11/21 1112  •  rOPINIRole (REQUIP) tablet 4 mg, 4 mg, Oral, Nightly, Seth Noriega DO  •  sodium chloride 0.9 % flush 10 mL, 10 mL, Intravenous, PRN, Naun Fields MD  •  [COMPLETED] Insert peripheral IV, , , Once **AND** sodium chloride 0.9 % flush 10 mL, 10 mL, Intravenous, PRN, Naun Fields MD  •  sodium chloride 0.9 % flush 10 mL, 10 mL, Intravenous, Q12H, Seth Noriega DO  •  sodium chloride 0.9 % flush 10 mL, 10 mL, Intravenous, PRN, Seth Noriega DO    Current Outpatient Medications:   •  albuterol (PROVENTIL HFA;VENTOLIN HFA) 108 (90 BASE) MCG/ACT inhaler, Inhale 2 puffs Every 4 (Four) Hours As Needed for wheezing., Disp: , Rfl:   •  carvedilol (COREG) 12.5 MG tablet, Take 1 tablet by mouth 2 (Two) Times a Day With Meals., Disp: , Rfl:   •  cloNIDine (CATAPRES) 0.2 MG tablet, Take 1 tablet by mouth Every 8 (Eight) Hours., Disp: , Rfl:   •  DULoxetine (CYMBALTA) 60 MG capsule, Take 60 mg by mouth daily., Disp: , Rfl:   •  famotidine (PEPCID) 20 MG tablet, Take 1 tablet by mouth Daily., Disp: 90 tablet, Rfl: 0  •  hydrALAZINE (APRESOLINE) 50 MG tablet, Take 50 mg by mouth 3 (Three) Times a Day., Disp: , Rfl:   •  lactulose (CHRONULAC) 10 GM/15ML solution, Take 20 g by mouth 3 (Three) Times a Day., Disp: , Rfl:   •  levothyroxine (SYNTHROID, LEVOTHROID) 25 MCG tablet, Take 25 mcg by  mouth Daily., Disp: , Rfl:   •  lisinopril (PRINIVIL,ZESTRIL) 20 MG tablet, Take 20 mg by mouth 2 (two) times a day., Disp: , Rfl:   •  nicotine (NICODERM CQ) 14 MG/24HR patch, Place 1 patch on the skin as directed by provider Daily. Take off it pt smoke., Disp: 30 patch, Rfl: 2  •  NIFEdipine XL (PROCARDIA XL) 60 MG 24 hr tablet, Take 60 mg by mouth 2 (two) times a day., Disp: , Rfl:   •  ondansetron (ZOFRAN) 4 MG tablet, Take 4 mg by mouth Every 8 (Eight) Hours As Needed for nausea or vomiting., Disp: , Rfl:   •  pregabalin (LYRICA) 75 MG capsule, Take 75 mg by mouth 2 (Two) Times a Day., Disp: , Rfl:   •  rOPINIRole (REQUIP) 4 MG tablet, Take 4 mg by mouth Every Night., Disp: , Rfl:   •  senna (SENOKOT) 8.6 MG tablet tablet, Take 1 tablet by mouth Every Night., Disp: , Rfl:   •  simvastatin (ZOCOR) 20 MG tablet, Take 20 mg by mouth Daily., Disp: , Rfl:   Allergies:  Penicillins, Lamisil [terbinafine], Reglan [metoclopramide], and Stadol [butorphanol]    Objective     Vital Signs  Temp:  [97.9 °F (36.6 °C)] 97.9 °F (36.6 °C)  Heart Rate:  [55-67] 64  Resp:  [18] 18  BP: ()/(54-80) 178/79    Physical Exam:   Vitals and nursing note reviewed.   Constitutional:       General: Not in acute distress.     Appearance: Well-developed and not in distress. Chronically ill-appearing. Not diaphoretic.   Neck:      Vascular: No JVD.   Pulmonary:      Effort: Pulmonary effort is normal. No respiratory distress.      Breath sounds: Normal breath sounds.   Cardiovascular:      Normal rate. Regular rhythm.      Murmurs: There is a systolic murmur.   Edema:     Peripheral edema absent.   Abdominal:      Tenderness: There is no abdominal tenderness.   Skin:     General: Skin is warm and dry.   Neurological:      Mental Status: Alert and oriented to person, place, and time.       Results Review:     Lab Results (last 72 hours)     Procedure Component Value Units Date/Time    POC Glucose Once [[id number]]  (Abnormal) Collected:  09/11/21 1111    Specimen: Blood Updated: 09/11/21 1139     Glucose 242 mg/dL      Comment: : [id number] Nancie GalindoMeter ID: [id number]       POC Glucose Once [[id number]]  (Abnormal) Collected: 09/11/21 0808    Specimen: Blood Updated: 09/11/21 0835     Glucose 149 mg/dL      Comment: : [id number] Nancie GalindoMeter ID: [id number]       Troponin [[id number]]  (Abnormal) Collected: 09/11/21 0655    Specimen: Blood Updated: 09/11/21 0732     Troponin T 0.147 ng/mL     Narrative:      Troponin T Reference Range:  <= 0.03 ng/mL-   Negative for AMI  >0.03 ng/mL-     Abnormal for myocardial necrosis.  Clinicians would have to utilize clinical acumen, EKG, Troponin and serial changes to determine if it is an Acute Myocardial Infarction or myocardial injury due to an underlying chronic condition.       Results may be falsely decreased if patient taking Biotin.      Comprehensive Metabolic Panel [[id number]]  (Abnormal) Collected: 09/11/21 0511    Specimen: Blood Updated: 09/11/21 0556     Glucose 122 mg/dL      BUN 37 mg/dL      Creatinine 3.91 mg/dL      Sodium 138 mmol/L      Potassium 2.9 mmol/L      Chloride 96 mmol/L      CO2 27.0 mmol/L      Calcium 8.5 mg/dL      Total Protein 6.1 g/dL      Albumin 3.10 g/dL      ALT (SGPT) 7 U/L      AST (SGOT) 14 U/L      Alkaline Phosphatase 108 U/L      Total Bilirubin 0.2 mg/dL      eGFR Non African Amer 12 mL/min/1.73      Comment: <15 Indicative of kidney failure.        eGFR   Amer --     Comment: <15 Indicative of kidney failure.        Globulin 3.0 gm/dL      A/G Ratio 1.0 g/dL      BUN/Creatinine Ratio 9.5     Anion Gap 15.0 mmol/L     Narrative:      GFR Normal >60  Chronic Kidney Disease <60  Kidney Failure <15      Troponin [[id number]]  (Abnormal) Collected: 09/11/21 0511    Specimen: Blood Updated: 09/11/21 0545     Troponin T 0.146 ng/mL     Narrative:      Troponin T Reference Range:  <= 0.03 ng/mL-   Negative for AMI  >0.03 ng/mL-     Abnormal for  myocardial necrosis.  Clinicians would have to utilize clinical acumen, EKG, Troponin and serial changes to determine if it is an Acute Myocardial Infarction or myocardial injury due to an underlying chronic condition.       Results may be falsely decreased if patient taking Biotin.      Phosphorus [[id number]]  (Abnormal) Collected: 09/11/21 0511    Specimen: Blood Updated: 09/11/21 0544     Phosphorus 7.2 mg/dL     CBC Auto Differential [[id number]]  (Abnormal) Collected: 09/11/21 0511    Specimen: Blood Updated: 09/11/21 0519     WBC 8.95 10*3/mm3      RBC 2.44 10*6/mm3      Hemoglobin 7.3 g/dL      Hematocrit 22.7 %      MCV 93.0 fL      MCH 29.9 pg      MCHC 32.2 g/dL      RDW 16.0 %      RDW-SD 54.1 fl      MPV 9.6 fL      Platelets 170 10*3/mm3      Neutrophil % 78.0 %      Lymphocyte % 11.2 %      Monocyte % 8.3 %      Eosinophil % 1.6 %      Basophil % 0.7 %      Immature Grans % 0.2 %      Neutrophils, Absolute 6.99 10*3/mm3      Lymphocytes, Absolute 1.00 10*3/mm3      Monocytes, Absolute 0.74 10*3/mm3      Eosinophils, Absolute 0.14 10*3/mm3      Basophils, Absolute 0.06 10*3/mm3      Immature Grans, Absolute 0.02 10*3/mm3      nRBC 0.0 /100 WBC     POC Glucose Once [[id number]]  (Abnormal) Collected: 09/11/21 0343    Specimen: Blood Updated: 09/11/21 0354     Glucose 140 mg/dL      Comment: : [id number] [facility] KaitlinM[facility] ID: [id number]       Troponin [[id number]]  (Abnormal) Collected: 09/11/21 0218    Specimen: Blood Updated: 09/11/21 0309     Troponin T 0.149 ng/mL     Narrative:      Troponin T Reference Range:  <= 0.03 ng/mL-   Negative for AMI  >0.03 ng/mL-     Abnormal for myocardial necrosis.  Clinicians would have to utilize clinical acumen, EKG, Troponin and serial changes to determine if it is an Acute Myocardial Infarction or myocardial injury due to an underlying chronic condition.       Results may be falsely decreased if patient taking Biotin.      POC Glucose Once [[id number]]   (Abnormal) Collected: 09/11/21 0234    Specimen: Blood Updated: 09/11/21 0245     Glucose 176 mg/dL      Comment: : [id number] Jesse Carrillo ID: [id number]       [place] Draw [[id number]] Collected: 09/11/21 0033    Specimen: Blood Updated: 09/11/21 0145    Narrative:      The following orders were created for panel order [place] Draw.  Procedure                               Abnormality         Status                     ---------                               -----------         ------                     Green Top (Gel)[[id number]]                                  Final result               Lavender Top[[id number]]                                     Final result               Red Top[[id number]]                                          Final result               Light Blue Top[[id number]]                                   Final result                 Please view results for these tests on the individual orders.    Green Top (Gel) [[id number]] Collected: 09/11/21 0033    Specimen: Blood Updated: 09/11/21 0145     Extra Tube Hold for add-ons.     Comment: Auto resulted.       Red Top [[id number]] Collected: 09/11/21 0033    Specimen: Blood Updated: 09/11/21 0145     Extra Tube Hold for add-ons.     Comment: Auto resulted.       Lavender Top [[id number]] Collected: 09/11/21 0033    Specimen: Blood Updated: 09/11/21 0145     Extra Tube hold for add-on     Comment: Auto resulted       Light Blue Top [[id number]] Collected: 09/11/21 0036    Specimen: Blood Updated: 09/11/21 0145     Extra Tube hold for add-on     Comment: Auto resulted       Urine Drug Screen - Urine, Catheter [[id number]]  (Normal) Collected: 09/11/21 0108    Specimen: Urine, Catheter Updated: 09/11/21 0138     THC, Screen, Urine Negative     Phencyclidine (PCP), Urine Negative     Cocaine Screen, Urine Negative     Methamphetamine, Ur Negative     Opiate Screen Negative     Amphetamine Screen, Urine Negative     Benzodiazepine Screen, Urine  Negative     Tricyclic Antidepressants Screen Negative     Methadone Screen, Urine Negative     Barbiturates Screen, Urine Negative     Oxycodone Screen, Urine Negative     Propoxyphene Screen Negative     Buprenorphine, Screen, Urine Negative    Narrative:      Cutoff For Drugs Screened:    Amphetamines               500 ng/ml  Barbiturates               200 ng/ml  Benzodiazepines            150 ng/ml  Cocaine                    150 ng/ml  Methadone                  200 ng/ml  Opiates                    100 ng/ml  Phencyclidine               25 ng/ml  THC                            50 ng/ml  Methamphetamine            500 ng/ml  Tricyclic Antidepressants  300 ng/ml  Oxycodone                  100 ng/ml  Propoxyphene               300 ng/ml  Buprenorphine               10 ng/ml    The normal value for all drugs tested is negative. This report includes unconfirmed screening results, with the cutoff values listed, to be used for medical treatment purposes only.  Unconfirmed results must not be used for non-medical purposes such as employment or legal testing.  Clinical consideration should be applied to any drug of abuse test, particularly when unconfirmed results are used.      Troponin [[id number]]  (Abnormal) Collected: 09/11/21 0033    Specimen: Blood Updated: 09/11/21 0134     Troponin T 0.169 ng/mL     Narrative:      Troponin T Reference Range:  <= 0.03 ng/mL-   Negative for AMI  >0.03 ng/mL-     Abnormal for myocardial necrosis.  Clinicians would have to utilize clinical acumen, EKG, Troponin and serial changes to determine if it is an Acute Myocardial Infarction or myocardial injury due to an underlying chronic condition.       Results may be falsely decreased if patient taking Biotin.      Comprehensive Metabolic Panel [[id number]]  (Abnormal) Collected: 09/11/21 0033    Specimen: Blood Updated: 09/11/21 0133     Glucose 40 mg/dL      BUN 34 mg/dL      Creatinine 3.98 mg/dL      Sodium 141 mmol/L       Potassium 2.3 mmol/L      Chloride 96 mmol/L      CO2 27.0 mmol/L      Calcium 9.2 mg/dL      Total Protein 7.1 g/dL      Albumin 3.80 g/dL      ALT (SGPT) 9 U/L      AST (SGOT) 15 U/L      Alkaline Phosphatase 125 U/L      Total Bilirubin 0.3 mg/dL      eGFR Non African Amer 12 mL/min/1.73      Comment: <15 Indicative of kidney failure.        eGFR   Amer --     Comment: <15 Indicative of kidney failure.        Globulin 3.3 gm/dL      A/G Ratio 1.2 g/dL      BUN/Creatinine Ratio 8.5     Anion Gap 18.0 mmol/L     Narrative:      GFR Normal >60  Chronic Kidney Disease <60  Kidney Failure <15      Urinalysis, Microscopic Only - Urine, Catheter In/Out [[id number]]  (Abnormal) Collected: 09/11/21 0108    Specimen: Urine, Catheter In/Out Updated: 09/11/21 0131     RBC, UA 3-5 /HPF      WBC, UA 0-2 /HPF      Bacteria, UA None Seen /HPF      Squamous Epithelial Cells, UA 0-2 /HPF      Hyaline Casts, UA 0-2 /LPF      Methodology Automated Microscopy    Urinalysis With Culture If Indicated - Urine, Catheter In/Out [[id number]]  (Abnormal) Collected: 09/11/21 0108    Specimen: Urine, Catheter In/Out Updated: 09/11/21 0131     Color, UA Yellow     Appearance, UA Clear     pH, UA 7.0     Specific Gravity, UA 1.017     Glucose, UA >=1000 mg/dL (3+)     Ketones, UA Negative     Bilirubin, UA Negative     Blood, UA Negative     Protein,  mg/dL (2+)     Leuk Esterase, UA Negative     Nitrite, UA Negative     Urobilinogen, UA 0.2 E.U./dL    COVID-19,[facility] IN-HOUSE,Nasal Swab No Transport Media 3-4 HR TAT - Swab, Nasal Cavity [[id number]]  (Normal) Collected: 09/11/21 0040    Specimen: Swab from Nasal Cavity Updated: 09/11/21 0129     COVID19 Not Detected    Narrative:      Fact sheet for providers: https://www.fda.gov/media/563263/download     Fact sheet for patients: https://www.fda.gov/media/385692/download    Test performed by PCR.    Consider negative results in combination with clinical observations, patient  history, and epidemiological information.    POC Glucose Once [[id number]]  (Abnormal) Collected: 09/11/21 0116    Specimen: Blood Updated: 09/11/21 0127     Glucose 150 mg/dL      Comment: : [id number] Trupti AlmodovarMeter ID: [id number]       D-dimer, Quantitative [[id number]]  (Abnormal) Collected: 09/11/21 0036    Specimen: Blood Updated: 09/11/21 0120     D-Dimer, Quantitative 1.22 mg/L (FEU)     Narrative:      Reference Range is 0-0.50 mg/L FEU. However, results <0.50 mg/L FEU tends to rule out DVT or PE. Results >0.50 mg/L FEU are not useful in predicting absence or presence of DVT or PE.      aPTT [[id number]]  (Normal) Collected: 09/11/21 0036    Specimen: Blood Updated: 09/11/21 0120     PTT 30.9 seconds     Protime-INR [[id number]]  (Normal) Collected: 09/11/21 0036    Specimen: Blood Updated: 09/11/21 0120     Protime 13.2 Seconds      INR 1.04    Blood Gas, Venous - [[id number]]  (Abnormal) Collected: 09/11/21 0108    Specimen: Venous Blood Updated: 09/11/21 0117     Site OTHER     pH, Venous 7.398 pH Units      pCO2, Venous 50.3 mm Hg      pO2, Venous 49.0 mm Hg      HCO3, Venous 31.0 mmol/L      Comment: 83 Value above reference range        Base Excess, Venous 5.4 mmol/L      Comment: 83 Value above reference range        O2 Saturation, Venous 83.3 %      Comment: 83 Value above reference range        Temperature 37.0 C      Barometric Pressure for Blood Gas 752 mmHg      Modality Room Air     Ventilator Mode NA     Collected by [id number]     Comment: Meter: [id number]     :  [id number]       Ammonia [[id number]]  (Normal) Collected: 09/11/21 0033    Specimen: Blood Updated: 09/11/21 0116     Ammonia 13 umol/L     Ethanol [[id number]] Collected: 09/11/21 0033    Specimen: Blood Updated: 09/11/21 0115     Ethanol % <0.010 %     Narrative:      Not for legal purposes. Chain of Custody not followed.     Magnesium [[id number]]  (Normal) Collected: 09/11/21 0033    Specimen: Blood Updated: 09/11/21  0114     Magnesium 2.4 mg/dL     POC Glucose Once [[id number]]  (Abnormal) Collected: 09/11/21 0056    Specimen: Blood Updated: 09/11/21 0107     Glucose 41 mg/dL      Comment: : [id number] Ray Grimes ID: [id number]       CBC & Differential [[id number]]  (Abnormal) Collected: 09/11/21 0033    Specimen: Blood Updated: 09/11/21 0049    Narrative:      The following orders were created for panel order CBC & Differential.  Procedure                               Abnormality         Status                     ---------                               -----------         ------                     CBC Auto Differential[[id number]]        Abnormal            Final result                 Please view results for these tests on the individual orders.    CBC Auto Differential [[id number]]  (Abnormal) Collected: 09/11/21 0033    Specimen: Blood Updated: 09/11/21 0049     WBC 8.75 10*3/mm3      RBC 2.79 10*6/mm3      Hemoglobin 8.2 g/dL      Hematocrit 25.8 %      MCV 92.5 fL      MCH 29.4 pg      MCHC 31.8 g/dL      RDW 16.1 %      RDW-SD 54.0 fl      MPV 9.7 fL      Platelets 217 10*3/mm3      Neutrophil % 68.8 %      Lymphocyte % 15.3 %      Monocyte % 11.4 %      Eosinophil % 3.2 %      Basophil % 1.1 %      Immature Grans % 0.2 %      Neutrophils, Absolute 6.01 10*3/mm3      Lymphocytes, Absolute 1.34 10*3/mm3      Monocytes, Absolute 1.00 10*3/mm3      Eosinophils, Absolute 0.28 10*3/mm3      Basophils, Absolute 0.10 10*3/mm3      Immature Grans, Absolute 0.02 10*3/mm3      nRBC 0.0 /100 WBC     POC Glucose Once [[id number]]  (Normal) Collected: 09/11/21 0003    Specimen: Blood Updated: 09/11/21 0014     Glucose 81 mg/dL      Comment: : [id number] Yo Fang ID: [id number]             Assessment/Plan     1.  Altered mental status: Resolved after treatment of hypoglycemia and administration of Narcan.  She is somewhat lethargic, but she awakens easily and answers questions appropriately now.    2.   Hypoglycemia: Resolved.  She was treated with D50, glucagon, dextrose infusion    3.  ESRD on dialysis 3 times per week: She states she goes Tuesday, Thursday, Saturday and has not missed recent dialysis sessions.    4.  Hypokalemia: Initially 2.3, followed by 2.9-replaced with a total of 40 MEQ; treatment per admitting team    5.  Myocardial injury: Patient has a chronically elevated, flat trending troponins in the setting of ESRD (highest reading 0.169 this admission).  No evidence of ACS.  She initially denied any chest pain, but during my exam she reports she has had a few days of chest discomfort with deep breathing.  At this point I am not concerned for angina. She does have EKG abnormalities-including prolonged QTC and inferolateral ST-T wave changes.  However, this is in the setting of hypokalemia and hypoglycemia and again her troponin trend and symptoms are not consistent with ACS.  Therefore, I would not recommend any further cardiac testing at this time.  Continue to monitor telemetry given her prolonged QTC.  Additionally, the patient underwent cardiac catheterization approximately 1 year ago in June 2020-this revealed nonobstructive coronary artery disease.  It is unclear to me what the exact circumstances were at the time of the catheterization-notes indicate she was admitted with volume overload, hypertensive urgency, and also had a mildly elevated flat trending troponin at that time.    6.  Diabetes mellitus type 2: She tells me she takes insulin at home, although this is not listed on her medication list.  She presented with hypoglycemia this admission after receiving an unknown amount of insulin at home.    7.  Essential hypertension: On Coreg, clonidine, hydralazine, lisinopril, nifedipine outpatient.  Stable.    8.  Tobacco abuse    9.  Chronic anemia in the setting of ESRD-stable.  However, she also tells me she has been having intermittent hemoptysis for the past few weeks.    Cardiology will  sign off, recall as needed.    I discussed the patient's findings and my recommendations with patient.     Electronically signed by Alejandra ENGLISH. GAEL Morillo, 09/11/21, 11:55 AM CDT.             Electronically signed by Alejandra Morillo APRN at 09/11/21 7706

## 2021-09-12 NOTE — PLAN OF CARE
Goal Outcome Evaluation:  Plan of Care Reviewed With: patient        Progress: no change  Outcome Summary: Pt arrived to unit after receiving dialysis treatment around 2230 this shift. A&Ox4. No reports of pain tonight. BG WNL, accuchecks q4 as ordered. R IJ dressing CDI. Turns self in bed. BP elevated this shift, MD contacted and order received. Safety maintained, will continue to monitor.

## 2021-09-12 NOTE — PROGRESS NOTES
[facility] Medicine Services  INPATIENT PROGRESS NOTE    Patient Name: Ena Upton  Date of Admission: 9/10/2021  Today's Date: 09/12/21  Length of Stay: 1  Primary Care Physician: Yaron Wiggins APRN    Subjective   Chief Complaint: AMS    HPI   Patient looks much better than she did yesterday.  She sitting up in chair at bedside on room air.  She alert oriented interactive.  Nursing states she is moving around with some assistance.  She denies chest pain or shortness of breath.  No abdominal pain or nausea.  Tolerating p.o.  Her main complaint is her right IJ catheter being uncomfortable.        Review of Systems   All pertinent negatives and positives are as above. All other systems have been reviewed and are negative unless otherwise stated.     Objective    Temp:  [98 °F (36.7 °C)-99 °F (37.2 °C)] 98.4 °F (36.9 °C)  Heart Rate:  [64-72] 71  Resp:  [16-18] 18  BP: (148-196)/(70-91) 189/74  Physical Exam  GEN: Awake, alert, interactive, in NAD  HEENT:PERRLA, EOMI, Anicteric, Trachea midline  Lungs: CTAB, no wheezing/rales/rhonchi  Heart: RRR, +S1/s2, no rub  ABD: soft, nt/nd, +BS, no guarding/rebound  Extremities:  no cyanosis, no edema  Skin: no rashes or petechiae  Neuro: AAOx3, no focal deficits  Psych: normal mood & affect        Results Review:  I have reviewed the labs, radiology results, and diagnostic studies.    Laboratory Data:   Results from last 7 days   Lab Units 09/12/21  0656 09/11/21  0511 09/11/21  0033   WBC 10*3/mm3 4.95 8.95 8.75   HEMOGLOBIN g/dL 7.2* 7.3* 8.2*   HEMATOCRIT % 23.0* 22.7* 25.8*   PLATELETS 10*3/mm3 157 170 217        Results from last 7 days   Lab Units 09/12/21  0904 09/11/21  0511 09/11/21  0033   SODIUM mmol/L 136 138 141   POTASSIUM mmol/L 4.5 2.9* 2.3*   CHLORIDE mmol/L 97* 96* 96*   CO2 mmol/L 28.0 27.0 27.0   BUN mg/dL 22* 37* 34*   CREATININE mg/dL 2.83* 3.91* 3.98*   CALCIUM mg/dL 8.6 8.5* 9.2   BILIRUBIN mg/dL  --  0.2  0.3   ALK PHOS U/L  --  108 125*   ALT (SGPT) U/L  --  7 9   AST (SGOT) U/L  --  14 15   GLUCOSE mg/dL 175* 122* 40*       Culture Data:   No results found for: BLOODCX, URINECX, WOUNDCX, MRSACX, RESPCX, STOOLCX    Radiology Data:   Imaging Results (Last 24 Hours)     ** No results found for the last 24 hours. **          I have reviewed the patient's current medications.     Assessment/Plan     Active Hospital Problems    Diagnosis    • **Altered mental status    • Troponin level elevated      No trend  No ischemic symptoms  Elevated due to ESRD     • Uncontrolled diabetes mellitus (CMS/[facility])    • End stage kidney disease (CMS/[facility])    • HTN (hypertension)        #1 altered mental status -suspect polypharmacy in the setting of pain medications, insulin, blood pressure meds.  Patient had no listed insulin on her home med list but states she uses an insulin start to the B.  Darren Otto.  Her pharmacy is [facility], will have nursing call and update insulins and meds.  She also was hypertensive on arrival but with holding her meds she is been significantly hypertensive.  Blood pressure in the 190s this morning.  Meds resumed.  No signs of active infection.  Not on antibiotics.  Doing much better today.  We will get PT to evaluate.    #2 DM 2 -as above we will update her insulins because she does take some despite what her home meds says.  We will need to adjust levels.  Suspect she is taking too much insulin.  Patient also states she thinks she accidentally took too much.  I do have some concern that pain medications may have played a role in her ability to use her medications properly.    #3 essential hypertension -again came in hypotensive.  Now hypertensive.  Resuming meds today.  Monitor.    #4 elevated troponin -flat.  No chest pain.  Not consistent with ACS.  Seen by cards.    #5 ESRD -status post dialysis yesterday.  Suspect she turned around quickly in the setting of dialysis removal of medications and  toxins.      Discharge Planning: PT evaluation today.  Update insulins.  Monitor on blood pressure med resumption.  Hopefully will be doing well enough to be discharged tomorrow.     Electronically signed by Adán Merrill DO, 09/12/21, 10:51 CDT.

## 2021-09-12 NOTE — PLAN OF CARE
Problem: Adult Inpatient Plan of Care  Goal: Plan of Care Review  Outcome: Ongoing, Progressing  Goal: Patient-Specific Goal (Individualized)  Outcome: Ongoing, Progressing  Goal: Absence of Hospital-Acquired Illness or Injury  Outcome: Ongoing, Progressing  Intervention: Identify and Manage Fall Risk  Recent Flowsheet Documentation  Taken 9/12/2021 0815 by Cynthia Angeles RN  Safety Promotion/Fall Prevention: safety round/check completed  Intervention: Prevent Skin Injury  Recent Flowsheet Documentation  Taken 9/12/2021 0815 by Cynthia Angeles RN  Body Position: supine  Skin Protection: adhesive use limited  Intervention: Prevent and Manage VTE (venous thromboembolism) Risk  Recent Flowsheet Documentation  Taken 9/12/2021 0815 by Cynthia Angeles RN  VTE Prevention/Management:   bilateral   sequential compression devices on  Intervention: Prevent Infection  Recent Flowsheet Documentation  Taken 9/12/2021 0815 by Cynthia Angeles RN  Infection Prevention: single patient room provided  Goal: Optimal Comfort and Wellbeing  Outcome: Ongoing, Progressing  Intervention: Provide Person-Centered Care  Recent Flowsheet Documentation  Taken 9/12/2021 0815 by Cynthia Angeles RN  Trust Relationship/Rapport: care explained  Goal: Readiness for Transition of Care  Outcome: Ongoing, Progressing     Problem: Skin Injury Risk Increased  Goal: Skin Health and Integrity  Outcome: Ongoing, Progressing  Intervention: Optimize Skin Protection  Recent Flowsheet Documentation  Taken 9/12/2021 0815 by Cynthia Angeles RN  Pressure Reduction Techniques: frequent weight shift encouraged  Head of Bed (HOB): HOB elevated  Pressure Reduction Devices: pressure-redistributing mattress utilized  Skin Protection: adhesive use limited     Problem: Fall Injury Risk  Goal: Absence of Fall and Fall-Related Injury  Outcome: Ongoing, Progressing  Intervention: Identify and Manage Contributors to Fall Injury Risk  Recent Flowsheet  Documentation  Taken 9/12/2021 0815 by Cynthia Angeles, RN  Medication Review/Management: medications reviewed  Intervention: Promote Injury-Free Environment  Recent Flowsheet Documentation  Taken 9/12/2021 0815 by Cynthia Angeles, RN  Safety Promotion/Fall Prevention: safety round/check completed   Goal Outcome Evaluation:

## 2021-09-12 NOTE — OUTREACH NOTE
Medical Week 3 Survey      Responses   [facility] patient discharged from?  [facility] [place]   Does the patient have one of the following disease processes/diagnoses(primary or secondary)?  Other   Week 3 attempt successful?  No   Revoke  Readmitted          Kim Ochoa RN

## 2021-09-12 NOTE — CASE MANAGEMENT/SOCIAL WORK
Discharge Planning Assessment  [facility]     Patient Name: Ena Upton  MRN: [id number]  Today's Date: 9/12/2021    Admit Date: 9/10/2021   Row Name 09/12/21 1018       Living Environment    Lives With  alone    Current Living Arrangements  home/apartment/condo    Primary Care Provided by  self    Provides Primary Care For  no one    Family Caregiver if Needed  sibling(s)    Quality of Family Relationships  helpful;involved;supportive    Able to Return to Prior Arrangements  yes       Resource/Environmental Concerns    Resource/Environmental Concerns  none    Transportation Concerns  car, none       Transition Planning    Patient/Family Anticipates Transition to  home    Patient/Family Anticipated Services at Transition  none    Transportation Anticipated  health plan transportation;family or friend will provide       Discharge Needs Assessment    Readmission Within the Last 30 Days  no previous admission in last 30 days    Equipment Currently Used at Home  cane, straight;glucometer;walker, standard;wheelchair    Concerns to be Addressed  no discharge needs identified;denies needs/concerns at this time    Anticipated Changes Related to Illness  none    Equipment Needed After Discharge  none    Discharge Coordination/Progress  Pt has RX coverage and a PCP. Pt lives at home alone and was discharged home after all [place] SNF's denying bed offers last admission. [facility] were going to offer a bed but pt did not want to move her outpt HD. Pt goes to [facility]. SW will follow for needs         Discharge Plan    No documentation.       Continued Care and Services - Admitted Since 9/10/2021    Coordination has not been started for this encounter.     Selected Continued Care - Prior Encounters Includes selections from prior encounters from 6/12/2021 to 9/12/2021    Discharged on 8/20/2021 Admission date: 8/17/2021 - Discharge disposition: Home or Self Care    Durable Medical Equipment     Service  Provider Selected Services Address Phone Fax Patient Preferred    [facility]  Durable Medical Equipment [address] [facility] [address] [phone] --                Discharged on 6/29/2021 Admission date: 6/19/2021 - Discharge disposition: Skilled Nursing Facility (DC - External)    Destination     Service Provider Selected Services Address Phone Fax Patient Preferred    [facility] [address] [facility] [address] [phone] --                      Demographic Summary    No documentation.       Functional Status    No documentation.       Psychosocial    No documentation.       Abuse/Neglect    No documentation.       Legal    No documentation.       Substance Abuse    No documentation.       Patient Forms    No documentation.           Tiarra Neslon

## 2021-09-13 NOTE — THERAPY DISCHARGE NOTE
Acute Care - Occupational Therapy Initial Evaluation/Discharge  [facility]     Patient Name: Ena Upton  : 1970  MRN: [id number]  Today's Date: 2021     Date of Referral to OT: 21  Referring Physician: Harrison Moya MD      Admit Date: 9/10/2021       ICD-10-CM ICD-9-CM   1. Altered mental status, unspecified altered mental status type  R41.82 780.97   2. Encephalopathy  G93.40 348.30   3. Uremia  N19 586   4. Hypoglycemia  E16.2 251.2   5. Metabolic acidosis  E87.2 276.2   6. Troponin level elevated  R77.8 790.6   7. Chronic narcotic use  F11.90 305.50   8. 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)   • Non-ketotic hyperosmolar coma (CMS/HCC)   • HTN (hypertension)   • Intractable nausea and vomiting   • Constipation   • Gastroparesis due to DM (CMS/HCC)   • Hyperglycemia   • Seizure (CMS/HCC)   • End stage kidney disease (CMS/HCC)   • Sacral insufficiency fracture   • Hypertensive urgency   • Uncontrolled diabetes mellitus (CMS/HCC)   • Hyperkalemia   • Altered mental status   • Troponin level elevated     Past Medical History:   Diagnosis Date   • Anxiety    • Arthritis    • Chronic kidney disease     STAGE V RENAL FAILURE   • Depression    • Diabetes mellitus (CMS/HCC)    • HTN (hypertension)    • Hypothyroidism    • Obesity    • Tremors of nervous system      Past Surgical History:   Procedure Laterality Date   • ARTERIOVENOUS FISTULA     • BREAST BIOPSY     • CARPAL TUNNEL RELEASE     • CATH LAB PROCEDURE     • CHOLECYSTECTOMY     • TUBAL ABDOMINAL LIGATION            OT ASSESSMENT FLOWSHEET (last 12 hours)      OT Evaluation and Treatment     Row Name 21 1140                   OT Time and Intention    Subjective Information  complains of;numbness;weakness;fatigue;pain;dyspnea;swelling numbness BLE  -AC (r) PF (t) AC (c)        Document Type  evaluation  -AC (r) PF (t)  AC (c)        Mode of Treatment  occupational therapy  -AC (r) PF (t) AC (c)        Patient Effort  good  -AC (r) PF (t) AC (c)           General Information    Patient Profile Reviewed  yes  -AC (r) PF (t) AC (c)        Referring Physician  Harrison Moya MD  -AC (r) PF (t) AC (c)        Prior Level of Function  independent:;all household mobility;cleaning;bathing;ADL's;cooking;home management;dressing;grooming;feeding;shopping;dependent:;driving  -AC (r) PF (t) AC (c)        Equipment Currently Used at Home  wheelchair;walker, standard;cane, straight;grab bar bathroom chair   -AC (r) PF (t) AC (c)        Pertinent History of Current Functional Problem  Hypoglycemia, end stage renal disease, LLE weakness Dx: altered mental status   -AC (r) PF (t) AC (c)        Existing Precautions/Restrictions  fall  -AC (r) PF (t) AC (c)           Living Environment    Current Living Arrangements  home/apartment/condo tub shower  -AC (r) PF (t) AC (c)        Home Accessibility  stairs to enter home  -AC (r) PF (t) AC (c)        Lives With  alone  -AC (r) PF (t) AC (c)           Home Main Entrance    Number of Stairs, Main Entrance  four  -AC (r) PF (t) AC (c)        Stair Railings, Main Entrance  railings on both sides of stairs  -AC (r) PF (t) AC (c)           Sensory    Additional Documentation  Sensory Assessment (Somatosensory) (Group)  -AC (r) PF (t) AC (c)           Sensory Assessment (Somatosensory)    Sensory Assessment (Somatosensory)  bilateral UE;sensation intact  -AC (r) PF (t) AC (c)        Bilateral UE Sensory Assessment  deep pressure awareness;light touch awareness;intact  -AC (r) PF (t) AC (c)           Cognition    Orientation Status (Cognition)  oriented x 4  -AC (r) PF (t) AC (c)           Pain Assessment    Additional Documentation  Pain Scale: Numbers Pre/Post-Treatment (Group)  -AC (r) PF (t) AC (c)           Pain Scale: Numbers Pre/Post-Treatment    Pretreatment Pain Rating  7/10  -AC (r) PF (t)  AC (c)        Posttreatment Pain Rating  6/10  -AC (r) PF (t) AC (c)        Pain Location - Orientation  posterior  -AC (r) PF (t) AC (c)        Pain Location  back  -AC (r) PF (t) AC (c)        Pain Intervention(s)  Medication (See MAR);Repositioned;Ambulation/increased activity  -AC (r) PF (t) AC (c)           Range of Motion Comprehensive    Comment, General Range of Motion  BUE AROM WFL   -AC (r) PF (t) AC (c)           Strength Comprehensive (MMT)    Comment, General Manual Muscle Testing (MMT) Assessment  BUE strength 5/5  -AC (r) PF (t) AC (c)           Bed Mobility    Comment (Bed Mobility)  Pt was found sitting EOB upon arrival   -AC (r) PF (t) AC (c)           Functional Mobility    Functional Mobility- Ind. Level  contact guard assist  -AC (r) PF (t) AC (c)        Functional Mobility- Device  rolling walker front wheeled walker   -AC (r) PF (t) AC (c)        Functional Mobility- Comment  Pt ambulated to  and [place]  -AC (r) PF (t) AC (c)           Transfer Assessment/Treatment    Transfers  sit-stand transfer;stand-sit transfer;toilet transfer  -AC (r) PF (t) AC (c)           Transfers    Sit-Stand [place] (Transfers)  contact guard  -AC (r) PF (t) AC (c)        Stand-Sit [place] (Transfers)  contact guard  -AC (r) PF (t) AC (c)        [place] Level (Toilet Transfer)  contact guard  -AC (r) PF (t) AC (c)        Assistive Device (Toilet Transfer)  commode;grab bars/safety frame;walker, front-wheeled  -AC (r) PF (t) AC (c)           Sit-Stand Transfer    Assistive Device (Sit-Stand Transfers)  walker, front-wheeled  -AC (r) PF (t) AC (c)           Stand-Sit Transfer    Assistive Device (Stand-Sit Transfers)  walker, front-wheeled  -AC (r) PF (t) AC (c)           Toilet Transfer    Type (Toilet Transfer)  sit-stand;stand-sit  -AC (r) PF (t) AC (c)           Safety Issues, Functional Mobility    Impairments Affecting Function (Mobility)  pain  -AC (r) PF (t) AC (c)           Balance     Balance Assessment  sitting static balance;sitting dynamic balance;standing static balance;standing dynamic balance  -AC (r) PF (t) AC (c)        Static Sitting Balance  WFL;supported;sitting, edge of bed  -AC (r) PF (t) AC (c)        Dynamic Sitting Balance  WFL;unsupported;sitting, edge of bed  -AC (r) PF (t) AC (c)        Static Standing Balance  WFL;supported;standing  -AC (r) PF (t) AC (c)        Dynamic Standing Balance  WFL;supported;standing  -AC (r) PF (t) AC (c)           Activities of Daily Living    BADL Assessment/Intervention  lower body dressing;toileting  -AC (r) PF (t) AC (c)           Lower Body Dressing Assessment/Training    [place] Level (Lower Body Dressing)  lower body dressing skills;don;pants/bottoms;shoes/slippers;standby assist  -AC (r) PF (t) AC (c)        Position (Lower Body Dressing)  edge of bed sitting  -AC (r) PF (t) AC (c)           Toileting Assessment/Training    [place] Level (Toileting)  toileting skills;adjust/manage clothing;standby assist  -AC (r) PF (t) AC (c)        Assistive Devices (Toileting)  commode;grab bar/safety frame  -AC (r) PF (t) AC (c)        Position (Toileting)  supported sitting  -AC (r) PF (t) AC (c)           BADL Safety/Performance    Impairments, BADL Safety/Performance  pain  -AC (r) PF (t) AC (c)           Plan of Care Review    Plan of Care Reviewed With  patient  -AC (r) PF (t) AC (c)        Progress  no change  -AC (r) PF (t) AC (c)        Outcome Summary  OT eval completed. Pt oriented x4. Pt reports 7/10 pain in posterior lower back. Pt was found EOB upon arrival. BUE AROM WFL. BUE strength 5/5. Pt donned lower body brief and shoes with SBA. Pt ambulated to the hallway and BR with SBA and with the assistance of a front wheeled walker. Pt performed toilet transfer with SBA and with the assistance of grab bars. Pt performed stand to sit transfer to chair with SBA. Pt was left sitting up in chair w/ call light near and eating lunch. OT  services are not warranted at this time due to currently being at [facility] function. Defer to PT. D/C recommendation to home with assistance.   -AC (r) PF (t) AC (c)           Positioning and Restraints    Pre-Treatment Position  in bed  -AC (r) PF (t) AC (c)        Post Treatment Position  chair  -AC (r) PF (t) AC (c)        In Chair  sitting;call light within reach;encouraged to call for assist;exit alarm on  -AC (r) PF (t) AC (c)           Therapy Assessment/Plan (OT)    Date of Referral to OT  09/12/21  -AC (r) PF (t) AC (c)        Criteria for Skilled Therapeutic Interventions Met (OT)  no problems identified which require skilled intervention  -AC (r) PF (t) AC (c)        Therapy Frequency (OT)  evaluation only  -AC (r) PF (t) AC (c)           Therapy Plan Review/Discharge Plan (OT)    Therapy Plan Review (OT)  patient  -AC (r) PF (t) AC (c)        Anticipated Discharge Disposition (OT)  home with assist  -AC (r) PF (t) AC (c)          User Key  (r) = Recorded By, (t) = Taken By, (c) = Cosigned By    Initials Name Effective Dates    AC Lobito Saunders, OTR/L, CNT 04/09/19 -     Tom Blue Jr., OT Student 08/04/21 -           Occupational Therapy Education                 Title: PT OT SLP Therapies (In Progress)     Topic: Occupational Therapy (In Progress)     Point: ADL training (Done)     Description:   Instruct learner(s) on proper safety adaptation and remediation techniques during self care or transfers.   Instruct in proper use of assistive devices.              Learning Progress Summary           Patient Acceptance, E,TB, VU by PF at 9/13/2021 1252    Comment: Safety during ADLs, safe transfer, OT process, Environmental safety                   Point: Home exercise program (Not Started)     Description:   Instruct learner(s) on appropriate technique for monitoring, assisting and/or progressing therapeutic exercises/activities.              Learner Progress:  Not documented in this visit.           Point: Precautions (Done)     Description:   Instruct learner(s) on prescribed precautions during self-care and functional transfers.              Learning Progress Summary           Patient Acceptance, E,TB, VU by PF at 9/13/2021 1252    Comment: Safety during ADLs, safe transfer, OT process, Environmental safety                   Point: Body mechanics (Done)     Description:   Instruct learner(s) on proper positioning and spine alignment during self-care, functional mobility activities and/or exercises.              Learning Progress Summary           Patient Acceptance, E,TB, VU by PF at 9/13/2021 1252    Comment: Safety during ADLs, safe transfer, OT process, Environmental safety                               User Key     Initials Effective Dates Name Provider Type Discipline     08/04/21 -  Tom Marvin Jr., OT Student OT Student OT                OT Recommendation and Plan  Therapy Frequency (OT): evaluation only  Plan of Care Review  Plan of Care Reviewed With: patient  Progress: no change  Outcome Summary: OT eval completed. Pt oriented x4. Pt reports 7/10 pain in posterior lower back. Pt was found EOB upon arrival. BUE AROM WFL. BUE strength 5/5. Pt donned lower body brief and shoes with SBA. Pt ambulated to the hallway and  with SBA and with the assistance of a front wheeled walker. Pt performed toilet transfer with SBA and with the assistance of grab bars. Pt performed stand to sit transfer to chair with SBA. Pt was left sitting up in chair w/ call light near and eating lunch. OT services are not warranted at this time due to currently being at [facility] function. Defer to PT. D/C recommendation to home with assistance.   Plan of Care Reviewed With: patient  Outcome Summary: OT eval completed. Pt oriented x4. Pt reports 7/10 pain in posterior lower back. Pt was found EOB upon arrival. BUE AROM WFL. BUE strength 5/5. Pt donned lower body brief and shoes with SBA. Pt ambulated to the hallway  and BR with SBA and with the assistance of a front wheeled walker. Pt performed toilet transfer with SBA and with the assistance of grab bars. Pt performed stand to sit transfer to chair with SBA. Pt was left sitting up in chair w/ call light near and eating lunch. OT services are not warranted at this time due to currently being at [facility] ADL function. Defer to PT. D/C recommendation to home with assistance.          Outcome Measures     Row Name 09/13/21 1140             How much help from another is currently needed...    Putting on and taking off regular lower body clothing?  4  -AC (r) PF (t) AC (c)      Bathing (including washing, rinsing, and drying)  3  -AC (r) PF (t) AC (c)      Toileting (which includes using toilet bed pan or urinal)  4  -AC (r) PF (t) AC (c)      Putting on and taking off regular upper body clothing  4  -AC (r) PF (t) AC (c)      Taking care of personal grooming (such as brushing teeth)  4  -AC (r) PF (t) AC (c)      Eating meals  4  -AC (r) PF (t) AC (c)      AM-PAC 6 Clicks Score (OT)  23  -AC (r) PF (t)         Functional Assessment    Outcome Measure Options  AM-PAC 6 Clicks Daily Activity (OT)  -AC (r) PF (t) AC (c)        User Key  (r) = Recorded By, (t) = Taken By, (c) = Cosigned By    Initials Name Provider Type    Lobito Glasgow, OTR/L, NIYA Occupational Therapist    Tom Blue Jr., OT Student OT Student          Time Calculation:   Time Calculation- OT     Row Name 09/13/21 1253             Time Calculation- OT    OT Start Time  1140 + 10 min chart review  -AC (r) PF (t) AC (c)      OT Stop Time  1220  -AC (r) PF (t) AC (c)      OT Time Calculation (min)  40 min  -AC (r) PF (t)      OT Received On  09/13/21  -AC (r) PF (t) AC (c)        User Key  (r) = Recorded By, (t) = Taken By, (c) = Cosigned By    Initials Name Provider Type    Lobito Glasgow, OTR/L, CNT Occupational Therapist    Tom Blue Jr., OT Student OT Student                   OT Discharge  Summary  Anticipated Discharge Disposition (OT): home with assist  Reason for Discharge: At baseline function  Outcomes Achieved: Other  Discharge Destination: Home with assist    HOLGER BOND, OT Student  9/13/2021

## 2021-09-13 NOTE — PROGRESS NOTES
Nephrology ([facility]) Progress Note      Patient:  Ena Upton  YOB: 1970  Date of Service: 9/13/2021  MRN: [id number]   Acct: [id number]   Primary Care Physician: Yaron Wiggins APRN  Advance Directive:   Code Status and Medical Interventions:   Ordered at: 09/11/21 0355     Level Of Support Discussed With:    Patient     Code Status:    CPR     Medical Interventions (Level of Support Prior to Arrest):    Full     Admit Date: 9/10/2021       Hospital Day: 2  Referring Provider: Seth Noriega DO      Patient personally seen and examined.  Complete chart including Consults, Notes, Operative Reports, Labs, Cardiology, and Radiology studies reviewed as able.    Chief complaint: Hypoglycemia/ESRD.    Subjective:  Ena Upton is a 51 y.o. female  whom we were consulted for end-stage renal disease.  Patient has history of end-stage renal disease on dialysis Tuesday Thursday Saturday.  She was admitted overnight for complaints of hypoglycemia.  Required glucose supplementation and a D10 drip for blood sugar control.  She was seen on initially on dialysis and tolerated without issue.  Denied current chest pain, shortness of air at rest, nausea or vomiting.  Recalled no recent issues with dialysis.    This afternoon she feels well.  Her blood sugar is persistently in normal range.  She is hoping to go home today.       Allergies:  Penicillins, Lamisil [terbinafine], Reglan [metoclopramide], and Stadol [butorphanol]    Home Meds:  Medications Prior to Admission   Medication Sig Dispense Refill Last Dose   • insulin aspart (NovoLOG FlexPen) 100 UNIT/ML solution pen-injector sc pen Inject 10 Units under the skin into the appropriate area as directed 3 (Three) Times a Day With Meals.      • Insulin Glargine (BASAGLAR KWIKPEN) 100 UNIT/ML injection pen Inject 40 Units under the skin into the appropriate area as directed Every Night.      • albuterol (PROVENTIL  HFA;VENTOLIN HFA) 108 (90 BASE) MCG/ACT inhaler Inhale 2 puffs Every 4 (Four) Hours As Needed for wheezing.      • carvedilol (COREG) 12.5 MG tablet Take 1 tablet by mouth 2 (Two) Times a Day With Meals.      • cloNIDine (CATAPRES) 0.2 MG tablet Take 1 tablet by mouth Every 8 (Eight) Hours. (Patient taking differently: Take 0.3 mg by mouth Every 8 (Eight) Hours.)      • DULoxetine (CYMBALTA) 60 MG capsule Take 60 mg by mouth daily.      • famotidine (PEPCID) 20 MG tablet Take 1 tablet by mouth Daily. 90 tablet 0    • hydrALAZINE (APRESOLINE) 50 MG tablet Take 50 mg by mouth 3 (Three) Times a Day.      • lactulose (CHRONULAC) 10 GM/15ML solution Take 20 g by mouth 3 (Three) Times a Day.      • levothyroxine (SYNTHROID, LEVOTHROID) 25 MCG tablet Take 25 mcg by mouth Daily.      • lisinopril (PRINIVIL,ZESTRIL) 20 MG tablet Take 20 mg by mouth 2 (two) times a day.      • nicotine (NICODERM CQ) 14 MG/24HR patch Place 1 patch on the skin as directed by provider Daily. Take off it pt smoke. 30 patch 2    • NIFEdipine XL (PROCARDIA XL) 60 MG 24 hr tablet Take 60 mg by mouth 2 (two) times a day.      • ondansetron (ZOFRAN) 4 MG tablet Take 4 mg by mouth Every 8 (Eight) Hours As Needed for nausea or vomiting.      • pregabalin (LYRICA) 75 MG capsule Take 75 mg by mouth 2 (Two) Times a Day.      • rOPINIRole (REQUIP) 4 MG tablet Take 4 mg by mouth Every Night.      • senna (SENOKOT) 8.6 MG tablet tablet Take 1 tablet by mouth Every Night.      • simvastatin (ZOCOR) 20 MG tablet Take 40 mg by mouth Daily.   Unknown at Unknown time       Medicines:  Current Facility-Administered Medications   Medication Dose Route Frequency Provider Last Rate Last Admin   • acetaminophen (TYLENOL) tablet 650 mg  650 mg Oral Q4H PRN Seth Noriega DO   650 mg at 09/12/21 2131   • atorvastatin (LIPITOR) tablet 10 mg  10 mg Oral Daily Seth Noriega DO   10 mg at 09/13/21 0842   • carvedilol (COREG) tablet 12.5 mg  12.5 mg Oral BID With  Meals Seth Noriega DO   12.5 mg at 09/13/21 0842   • dextrose (D50W) 25 g/ 50mL Intravenous Solution 25 g  25 g Intravenous Q15 Min PRN Adán Merrill DO       • dextrose (GLUTOSE) oral gel 15 g  15 g Oral Q15 Min PRN Adán Merrill DO       • DULoxetine (CYMBALTA) DR capsule 60 mg  60 mg Oral Daily Seth Noriega DO   60 mg at 09/13/21 0842   • famotidine (PEPCID) tablet 20 mg  20 mg Oral Daily Seth Noriega DO   20 mg at 09/13/21 0842   • glucagon (human recombinant) (GLUCAGEN DIAGNOSTIC) injection 1 mg  1 mg Subcutaneous Q15 Min PRN Adán Merrill DO       • hydrALAZINE (APRESOLINE) tablet 50 mg  50 mg Oral TID Isabel Grullon APRN   50 mg at 09/13/21 1532   • insulin lispro (humaLOG) injection 2-7 Units  2-7 Units Subcutaneous TID AC Adán Merrill DO   4 Units at 09/13/21 1120   • lactulose solution 20 g  20 g Oral TID Seth Noriega DO   20 g at 09/13/21 1532   • levothyroxine (SYNTHROID, LEVOTHROID) tablet 25 mcg  25 mcg Oral Q AM Seth Noriega DO   25 mcg at 09/13/21 0612   • lisinopril (PRINIVIL,ZESTRIL) tablet 20 mg  20 mg Oral Q12H Adán Merrill DO   20 mg at 09/13/21 0842   • nicotine (NICODERM CQ) 14 MG/24HR patch 1 patch  1 patch Transdermal Q24H Seth Noriega DO   1 patch at 09/12/21 0813   • NIFEdipine XL (PROCARDIA XL) 24 hr tablet 60 mg  60 mg Oral BID Adán Merrill DO   60 mg at 09/13/21 0842   • ondansetron (ZOFRAN) injection 4 mg  4 mg Intravenous Q6H PRN Seth Noriega DO       • pregabalin (LYRICA) capsule 75 mg  75 mg Oral Q12H Seth Noriega DO   75 mg at 09/13/21 0842   • rOPINIRole (REQUIP) tablet 4 mg  4 mg Oral Nightly Seth Noriega,    4 mg at 09/12/21 2131   • sodium chloride 0.9 % flush 10 mL  10 mL Intravenous PRN Naun Fields MD       • sodium chloride 0.9 % flush 10 mL  10 mL Intravenous PRN Naun Fields MD       • sodium chloride 0.9 % flush 10 mL  10 mL Intravenous Q12H Seth Noriega,    10 mL at  09/13/21 0842   • sodium chloride 0.9 % flush 10 mL  10 mL Intravenous Seth Blackman DO           Past Medical History:  Past Medical History:   Diagnosis Date   • Anxiety    • Arthritis    • Chronic kidney disease     STAGE V RENAL FAILURE   • Depression    • Diabetes mellitus (CMS/HCC)    • HTN (hypertension)    • Hypothyroidism    • Obesity    • Tremors of nervous system        Past Surgical History:  Past Surgical History:   Procedure Laterality Date   • ARTERIOVENOUS FISTULA     • BREAST BIOPSY     • CARPAL TUNNEL RELEASE     • CATH LAB PROCEDURE     • CHOLECYSTECTOMY     • TUBAL ABDOMINAL LIGATION         Family History  Family History   Problem Relation Age of Onset   • Cancer Other    • Hypertension Other    • Kidney disease Other    • Heart disease Other    • Diabetes Other    • Diabetes Mother    • Diabetes Maternal Aunt    • Diabetes Maternal Grandmother        Social History  Social History     Socioeconomic History   • Marital status:      Spouse name: Not on file   • Number of children: Not on file   • Years of education: Not on file   • Highest education level: Not on file   Tobacco Use   • Smoking status: Current Every Day Smoker     Packs/day: 0.50   • Smokeless tobacco: Never Used   • Tobacco comment: CUTTING BACK AND TRYING TO STOP SMOKING   Substance and Sexual Activity   • Alcohol use: No   • Drug use: No   • Sexual activity: Defer         Review of Systems:  History obtained from chart review and the patient  General ROS: No fever or chills  Respiratory ROS: No cough, shortness of breath, wheezing  Cardiovascular ROS: No chest pain or palpitations  Gastrointestinal ROS: No abdominal pain or melena  Genito-Urinary ROS: No dysuria or hematuria  Psych ROS: No anxiety and depression    Objective:  Patient Vitals for the past 24 hrs:   BP Temp Temp src Pulse Resp SpO2   09/13/21 1500 139/62 97.5 °F (36.4 °C) Oral 68 18 97 %   09/13/21 1100 154/67 98.2 °F (36.8 °C) Oral 73 16 96 %    09/13/21 0700 171/69 98.7 °F (37.1 °C) Oral 75 16 95 %   09/13/21 0435 173/70 98.3 °F (36.8 °C) Oral 73 16 96 %   09/12/21 2340 165/68 98.2 °F (36.8 °C) Oral 71 16 96 %   09/12/21 2008 127/64 98.3 °F (36.8 °C) Oral 63 16 98 %     No intake or output data in the 24 hours ending 09/13/21 1621  General: awake/alert   HEENT: Normocephalic atraumatic head  Neck: Supple with no JVD or carotid bruits.  Chest:  clear to auscultation bilaterally without respiratory distress  CVS: regular rate and rhythm  Abdominal: soft, nontender, positive bowel sounds  Extremities: no cyanosis or edema  Skin: warm and dry without rash      Labs:  Results from last 7 days   Lab Units 09/12/21  0656 09/11/21  0511 09/11/21  0033   WBC 10*3/mm3 4.95 8.95 8.75   HEMOGLOBIN g/dL 7.2* 7.3* 8.2*   HEMATOCRIT % 23.0* 22.7* 25.8*   PLATELETS 10*3/mm3 157 170 217         Results from last 7 days   Lab Units 09/12/21  0904 09/11/21  0511 09/11/21  0033   SODIUM mmol/L 136 138 141   POTASSIUM mmol/L 4.5 2.9* 2.3*   CHLORIDE mmol/L 97* 96* 96*   CO2 mmol/L 28.0 27.0 27.0   BUN mg/dL 22* 37* 34*   CREATININE mg/dL 2.83* 3.91* 3.98*   CALCIUM mg/dL 8.6 8.5* 9.2   BILIRUBIN mg/dL  --  0.2 0.3   ALK PHOS U/L  --  108 125*   ALT (SGPT) U/L  --  7 9   AST (SGOT) U/L  --  14 15   GLUCOSE mg/dL 175* 122* 40*       Radiology:   Imaging Results (Last 72 Hours)     Procedure Component Value Units Date/Time    CT Head Without Contrast [[id number]] Collected: 09/11/21 0712     Updated: 09/11/21 0717    Narrative:      EXAMINATION:   CT HEAD WO CONTRAST-  9/11/2021 7:12 AM CDT     HISTORY: CT BRAIN without contrast dated 9/11/2021 2:41 AM CDT     HISTORY: Headache     COMPARISON: June 19, 2021      DOSE LENGTH PRODUCT: 607 mGy cm     TECHNIQUE: Serial axial tomographic images of the brain were obtained  without the use of intravenous contrast.      FINDINGS:   The midline structures are nondisplaced. The ventricles and basilar  cisterns are normal in size and  configuration. There is no evidence of  intracranial hemorrhage or mass-effect. The gray-white matter  differentiation is maintained. The sulci have a normal configuration,  and there are no abnormal extra-axial fluid collections. The structures  of the posterior fossa are unremarkable.      The included orbits and their contents are unremarkable. The visualized  paranasal sinuses, mastoid air cells and middle ear cavities are clear.  The visualized osseous structures and overlying soft tissues of the  skull and face are unremarkable.        Impression:      1. No acute intracranial process.        This report was finalized on 09/11/2021 07:14 by Dr. Lazaro Martinez MD.    XR Chest 1 View [[id number]] Collected: 09/11/21 0712     Updated: 09/11/21 0715    Narrative:      EXAM: XR CHEST 1 - 9/11/2021 2:12 AM CDT     HISTORY: central line placement       COMPARISON: September 11, 2021 at 12:40 AM.     TECHNIQUE: Frontal radiograph of the chest     FINDINGS:   The lungs are clear. Cardiac silhouettes mildly enlarged. Right internal  jugular catheters satisfactorily position in superior vena cava..      The osseous structures and surrounding soft tissues demonstrate no acute  abnormality.          Impression:      1. Mild cardiomegaly with no evidence pulmonary vascular congestion.        This report was finalized on 09/11/2021 07:12 by Dr. Lazaro Martinez MD.    XR Chest 1 View [[id number]] Collected: 09/11/21 0654     Updated: 09/11/21 0658    Narrative:      EXAM: XR CHEST 1 - 9/11/2021 12:54 AM CDT     HISTORY: Chest pain       COMPARISON: August 17, 2021.     TECHNIQUE: Frontal radiograph of the chest     FINDINGS:   Vague groundglass infiltrates noted in the right lung base.. Cardiac  silhouettes moderately enlarged..      The osseous structures and surrounding soft tissues demonstrate no acute  abnormality.          Impression:      1. Vague groundglass infiltrate right lung base. This is suspicious for  an early  inflammatory process.        This report was finalized on 09/11/2021 06:55 by Dr. Lazaro Martinez MD.          Culture:  No results found for: BLOODCX, URINECX, WOUNDCX, MRSACX, RESPCX, STOOLCX      Assessment   1.  End-stage renal disease on maintenance dialysis.  2.  Type II diabetic nephropathy.  3.  Anemia of chronic kidney disease.  4.  Secondary hyperparathyroidism.  5.  Hypokalemia.  6.  Hypoglycemia now resolved.     Plan:  1.  Routine hemodialysis treatment tomorrow.  2.  Resume [facility].  3.  May go home today.         Brandon Ann MD  9/13/2021  16:21 CDT

## 2021-09-13 NOTE — PLAN OF CARE
"Goal Outcome Evaluation:      PATIENT IS ALERT AND ORIENTED, HAS NO COMPLAINT OF PAIN. SKIN IS UNREMARKABLE WITH NO WOUNDS OR DRESSINGS, SOME BRUISING AND JAUNDICE. PATIENT ASKING FOR DIALYSIS TODAY STATING \"I AM PRETTY FULL OF FLUID\", MD MADE AWARE OF PATIENT REQUEST AND STATES HE WILL SEE HER TODAY. CALL LIGHT IS WITHIN REACH AND PATIENT DEMONSTRATES USE. MOVES ALL EXTREMITIES AND HAS NO NEUROVASCULAR DEFICITS. PATIENT EATING WELL WITHOUT DIFFICULTY. SAFETY IS MAINTAINED WITH HOURLY ROUNDING.            "

## 2021-09-13 NOTE — PROGRESS NOTES
Continued Stay Note   [facility]     Patient Name: Ena Upton  MRN: [id number]  Today's Date: 9/13/2021    Admit Date: 9/10/2021    Discharge Plan     Row Name 09/13/21 1635       Plan    Final Discharge Disposition Code  01 - home or self-care    Final Note  Spoke to pt about dc planning needs. Pt states she plans to dc home and does not want HH services. Pt states she has all DME needed at home and uses [facility] for transportation to and from HD. No dc needs per pt.        Discharge Codes    No documentation.       Expected Discharge Date and Time     Expected Discharge Date Expected Discharge Time    Sep 13, 2021             INÉS Emanuel

## 2021-09-13 NOTE — PLAN OF CARE
Goal Outcome Evaluation:  Plan of Care Reviewed With: patient        Progress: no change  Outcome Summary: PT evaluation completed. The patient presents alert and oriented x4 sitting up in the chair and talking on the phone. She reports having a scheduled back surgery due to compression on a nerve affecting her L LE. Her L LE is weaker and has diminished sensation that corelates with peripheral nerve root involvement. She demonstrates ability to safely ambulate with use of a walker. She is safe to return home from the PT standpoint.

## 2021-09-13 NOTE — PLAN OF CARE
Goal Outcome Evaluation:  Plan of Care Reviewed With: patient        Progress: no change  Outcome Summary: VSS, bp sl elevated on scheduled bp meds. C/o intermit backache, prn tylenol given. Accuchecks q 4, elevated, call in to MD, SCD in place. Safety maintained.

## 2021-09-13 NOTE — PLAN OF CARE
Goal Outcome Evaluation:  Plan of Care Reviewed With: (P) patient        Progress: (P) no change  Outcome Summary: (P) OT eval completed. Pt oriented x4. Pt reports 7/10 pain in posterior lower back. Pt was found EOB upon arrival. BUE AROM WFL. BUE strength 5/5. Pt donned lower body brief and shoes with SBA. Pt ambulated to the hallway and BR with SBA and with the assistance of a front wheeled walker. Pt performed toilet transfer with SBA and with the assistance of grab bars. Pt performed stand to sit transfer to chair with SBA. Pt was left sitting up in chair w/ call light near and eating lunch. OT services are not warranted at this time due to currently being at [facility] ADL function. Defer to PT. D/C recommendation to home with assistance.

## 2021-09-13 NOTE — THERAPY DISCHARGE NOTE
Patient Name: Ena Upton  : 1970    MRN: [id number]                              Today's Date: 2021       Admit Date: 9/10/2021    Visit Dx:     ICD-10-CM ICD-9-CM   1. Altered mental status, unspecified altered mental status type  R41.82 780.97   2. Encephalopathy  G93.40 348.30   3. Uremia  N19 586   4. Hypoglycemia  E16.2 251.2   5. Metabolic acidosis  E87.2 276.2   6. Troponin level elevated  R77.8 790.6   7. Chronic narcotic use  F11.90 305.50   8. 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)   • Non-ketotic hyperosmolar coma (CMS/HCC)   • HTN (hypertension)   • Intractable nausea and vomiting   • Constipation   • Gastroparesis due to DM (CMS/HCC)   • Hyperglycemia   • Seizure (CMS/HCC)   • End stage kidney disease (CMS/HCC)   • Sacral insufficiency fracture   • Hypertensive urgency   • Uncontrolled diabetes mellitus (CMS/HCC)   • Hyperkalemia   • Altered mental status   • Troponin level elevated     Past Medical History:   Diagnosis Date   • Anxiety    • Arthritis    • Chronic kidney disease     STAGE V RENAL FAILURE   • Depression    • Diabetes mellitus (CMS/HCC)    • HTN (hypertension)    • Hypothyroidism    • Obesity    • Tremors of nervous system      Past Surgical History:   Procedure Laterality Date   • ARTERIOVENOUS FISTULA     • BREAST BIOPSY     • CARPAL TUNNEL RELEASE     • CATH LAB PROCEDURE     • CHOLECYSTECTOMY     • TUBAL ABDOMINAL LIGATION       General Information     Row Name 21 1310          Physical Therapy Time and Intention    Document Type  evaluation AMS, CKD  -MS     Mode of Treatment  physical therapy;individual therapy  -MS     Row Name 21 1310          General Information    Patient Profile Reviewed  yes  -MS     Prior Level of Function  independent:;all household mobility;ADL's  -MS     Existing Precautions/Restrictions  fall  -MS     Barriers to Rehab   physical barrier  -MS     Row Name 09/13/21 1310          Living Environment    Lives With  -- pt reports her boyfriend will be at home with her  -MS     Row Name 09/13/21 1310          Home Main Entrance    Number of Stairs, Main Entrance  five  -MS     Stair Railings, Main Entrance  railings on both sides of stairs  -MS     Row Name 09/13/21 1310          Cognition    Orientation Status (Cognition)  oriented x 4  -MS     Row Name 09/13/21 1310          Safety Issues, Functional Mobility    Impairments Affecting Function (Mobility)  strength;sensation/sensory awareness  -MS       User Key  (r) = Recorded By, (t) = Taken By, (c) = Cosigned By    Initials Name Provider Type    MS BarnardKwasiAmy ARIE, PT, DPT, NCS Physical Therapist        Mobility     Row Name 09/13/21 1310          Bed Mobility    Comment (Bed Mobility)  up in chair  -MS     Row Name 09/13/21 1310          Sit-Stand Transfer    Sit-Stand [place] (Transfers)  modified independence  -MS     Assistive Device (Sit-Stand Transfers)  walker, front-wheeled  -MS     Row Name 09/13/21 1310          Gait/Stairs (Locomotion)    [place] Level (Gait)  modified independence  -MS     Assistive Device (Gait)  walker, front-wheeled  -MS     Distance in Feet (Gait)  150ft with poor clearance of L foot,but no LOB  -MS     Number of Steps (Stairs)  pt refused to attempt stairs  -MS       User Key  (r) = Recorded By, (t) = Taken By, (c) = Cosigned By    Initials Name Provider Type    MS Barnard Amy SARGENT, PT, DPT, NCS Physical Therapist        Obj/Interventions     Row Name 09/13/21 1310          Range of Motion Comprehensive    General Range of Motion  no range of motion deficits identified  -MS     Row Name 09/13/21 1310          Strength Comprehensive (MMT)    Comment, General Manual Muscle Testing (MMT) Assessment  L dorsiflexion 4-/5, L knee flex 4-/5, L hip flexion 4-/5  -MS     Row Name 09/13/21 1310          Balance    Static Sitting Balance  WFL;sitting  in chair  -MS     Dynamic Sitting Balance  WFL;sitting in chair  -MS     Static Standing Balance  WNL;supported  -MS     Dynamic Standing Balance  WNL;supported  -MS     Row Name 09/13/21 1310          Sensory Assessment (Somatosensory)    Sensory Assessment (Somatosensory)  -- L L4-S1 dermatomes have deminished senstation for light touch  -MS       User Key  (r) = Recorded By, (t) = Taken By, (c) = Cosigned By    Initials Name Provider Type    MS Amy Barnard R, PT, DPT, NCS Physical Therapist        Goals/Plan    No documentation.       Clinical Impression     Row Name 09/13/21 1310          Pain    Additional Documentation  Pain Scale: Numbers Pre/Post-Treatment (Group)  -MS     Row Name 09/13/21 1310          Pain Scale: Numbers Pre/Post-Treatment    Pretreatment Pain Rating  7/10  -MS     Posttreatment Pain Rating  7/10  -MS     Pain Location  back  -MS     Pain Intervention(s)  Repositioned;Ambulation/increased activity  -MS     Moni Name 09/13/21 1310          Plan of Care Review    Plan of Care Reviewed With  patient  -MS     Progress  no change  -MS     Outcome Summary  PT evaluation completed. The patient presents alert and oriented x4 sitting up in the chair and talking on the phone. She reports having a scheduled back surgery due to compression on a nerve affecting her L LE. Her L LE is weaker and has diminished sensation that corelates with peripheral nerve root involvement. She demonstrates ability to safely ambulate with use of a walker. She is safe to return home from the PT standpoint.  -MS     Moni Name 09/13/21 1310          Therapy Assessment/Plan (PT)    Criteria for Skilled Interventions Met (PT)  no problems identified which require skilled intervention  -MS     Moni Name 09/13/21 1310          Positioning and Restraints    In Chair  sitting;call light within reach;encouraged to call for assist  -MS       User Key  (r) = Recorded By, (t) = Taken By, (c) = Cosigned By    Initials Name Provider  Type    Amy Ellison ARIE, PT, DPT, NCS Physical Therapist        Outcome Measures     Row Name 09/13/21 1422          How much help from another person do you currently need...    Turning from your back to your side while in flat bed without using bedrails?  4  -MS     Moving from lying on back to sitting on the side of a flat bed without bedrails?  4  -MS     Moving to and from a bed to a chair (including a wheelchair)?  4  -MS     Standing up from a chair using your arms (e.g., wheelchair, bedside chair)?  4  -MS     Climbing 3-5 steps with a railing?  3  -MS     To walk in hospital room?  4  -MS     AM-PAC 6 Clicks Score (PT)  23  -MS     Row Name 09/13/21 1422 09/13/21 1140       Functional Assessment    Outcome Measure Options  AM-PAC 6 Clicks Basic Mobility (PT)  -MS  AM-PAC 6 Clicks Daily Activity (OT)  (Pended)   -PF      User Key  (r) = Recorded By, (t) = Taken By, (c) = Cosigned By    Initials Name Provider Type    Amy Ellison ARIE, PT, DPT, NCS Physical Therapist    PF Tom Marvin Jr., OT Student OT Student        Physical Therapy Education                 Title: PT OT SLP Therapies (In Progress)     Topic: Physical Therapy (Not Started)     Point: Mobility training (Not Started)     Learner Progress:  Not documented in this visit.          Point: Home exercise program (Not Started)     Learner Progress:  Not documented in this visit.          Point: Body mechanics (Not Started)     Learner Progress:  Not documented in this visit.          Point: Precautions (Not Started)     Learner Progress:  Not documented in this visit.                          PT Recommendation and Plan     Plan of Care Reviewed With: patient  Progress: no change  Outcome Summary: PT evaluation completed. The patient presents alert and oriented x4 sitting up in the chair and talking on the phone. She reports having a scheduled back surgery due to compression on a nerve affecting her L LE. Her L LE is weaker and has  diminished sensation that corelates with peripheral nerve root involvement. She demonstrates ability to safely ambulate with use of a walker. She is safe to return home from the PT standpoint.     Time Calculation:   PT Charges     Row Name 09/13/21 1422             Time Calculation    Start Time  1310 3 min chart review  -MS      Stop Time  1330  -MS      Time Calculation (min)  20 min  -MS      PT Received On  09/13/21  -MS         Untimed Charges    PT Eval/Re-eval Minutes  23  -MS         Total Minutes    Untimed Charges Total Minutes  23  -MS       Total Minutes  23  -MS        User Key  (r) = Recorded By, (t) = Taken By, (c) = Cosigned By    Initials Name Provider Type    MS Amy Barnard, PT, DPT, NCS Physical Therapist        Therapy Charges for Today     Code Description Service Date Service Provider Modifiers Qty    12537981523 HC PT EVAL LOW COMPLEXITY 2 9/13/2021 Amy Barnard PT, DPT, NCS GP 1          PT G-Codes  Outcome Measure Options: AM-PAC 6 Clicks Basic Mobility (PT)  AM-PAC 6 Clicks Score (PT): 23  AM-PAC 6 Clicks Score (OT): (P) 23         Amy Barnard, PT, DPT, NCS  9/13/2021

## 2021-09-13 NOTE — PLAN OF CARE
Goal Outcome Evaluation:      PATIENT DISCHARGED AT THIS TIME, TELEMETRY REMOVED, IV DISCONTINUED WITHOUT DIFFICULTY. PATIENT GIVEN ALL INSTRUCTIONS AND STATES UNDERSTANDING OF THEM.  PATIENT TAKEN VIA WHEELCHAIR TO DISCHARGE AREA AND FRIEND TO DRIVE HER HOME.

## 2021-09-13 NOTE — PROGRESS NOTES
Nephrology ([facility]) Progress Note      Patient:  Ena Upton  YOB: 1970  Date of Service: 9/12/2021  MRN: [id number]   Acct: [id number]   Primary Care Physician: Yaron Wiggins APRN  Advance Directive:   Code Status and Medical Interventions:   Ordered at: 09/11/21 0355     Level Of Support Discussed With:    Patient     Code Status:    CPR     Medical Interventions (Level of Support Prior to Arrest):    Full     Admit Date: 9/10/2021       Hospital Day: 1  Referring Provider: Seth Noriega DO      Patient personally seen and examined.  Complete chart including Consults, Notes, Operative Reports, Labs, Cardiology, and Radiology studies reviewed as able.        Subjective:  Ena Upton is a 51 y.o. female  whom we were consulted for end-stage renal disease.  Patient has history of end-stage renal disease on dialysis Tuesday Thursday Saturday.  She was admitted overnight for complaints of hypoglycemia.  Required glucose supplementation and a D10 drip for blood sugar control.  She was seen on initially on dialysis and tolerated without issue.  Denied current chest pain, shortness of air at rest, nausea or vomiting.  Recalled no recent issues with dialysis.    Today, feeling better.  Notes better sugar control.  Denied cp/soa/n/v.       Allergies:  Penicillins, Lamisil [terbinafine], Reglan [metoclopramide], and Stadol [butorphanol]    Home Meds:  Medications Prior to Admission   Medication Sig Dispense Refill Last Dose   • insulin aspart (NovoLOG FlexPen) 100 UNIT/ML solution pen-injector sc pen Inject 10 Units under the skin into the appropriate area as directed 3 (Three) Times a Day With Meals.      • Insulin Glargine (BASAGLAR KWIKPEN) 100 UNIT/ML injection pen Inject 40 Units under the skin into the appropriate area as directed Every Night.      • albuterol (PROVENTIL HFA;VENTOLIN HFA) 108 (90 BASE) MCG/ACT inhaler Inhale 2 puffs Every 4 (Four) Hours As  Needed for wheezing.      • carvedilol (COREG) 12.5 MG tablet Take 1 tablet by mouth 2 (Two) Times a Day With Meals.      • cloNIDine (CATAPRES) 0.2 MG tablet Take 1 tablet by mouth Every 8 (Eight) Hours. (Patient taking differently: Take 0.3 mg by mouth Every 8 (Eight) Hours.)      • DULoxetine (CYMBALTA) 60 MG capsule Take 60 mg by mouth daily.      • famotidine (PEPCID) 20 MG tablet Take 1 tablet by mouth Daily. 90 tablet 0    • hydrALAZINE (APRESOLINE) 50 MG tablet Take 50 mg by mouth 3 (Three) Times a Day.      • lactulose (CHRONULAC) 10 GM/15ML solution Take 20 g by mouth 3 (Three) Times a Day.      • levothyroxine (SYNTHROID, LEVOTHROID) 25 MCG tablet Take 25 mcg by mouth Daily.      • lisinopril (PRINIVIL,ZESTRIL) 20 MG tablet Take 20 mg by mouth 2 (two) times a day.      • nicotine (NICODERM CQ) 14 MG/24HR patch Place 1 patch on the skin as directed by provider Daily. Take off it pt smoke. 30 patch 2    • NIFEdipine XL (PROCARDIA XL) 60 MG 24 hr tablet Take 60 mg by mouth 2 (two) times a day.      • ondansetron (ZOFRAN) 4 MG tablet Take 4 mg by mouth Every 8 (Eight) Hours As Needed for nausea or vomiting.      • pregabalin (LYRICA) 75 MG capsule Take 75 mg by mouth 2 (Two) Times a Day.      • rOPINIRole (REQUIP) 4 MG tablet Take 4 mg by mouth Every Night.      • senna (SENOKOT) 8.6 MG tablet tablet Take 1 tablet by mouth Every Night.      • simvastatin (ZOCOR) 20 MG tablet Take 40 mg by mouth Daily.   Unknown at Unknown time       Medicines:  Current Facility-Administered Medications   Medication Dose Route Frequency Provider Last Rate Last Admin   • acetaminophen (TYLENOL) tablet 650 mg  650 mg Oral Q4H PRN Seth Noriega, DO   650 mg at 09/12/21 1446   • atorvastatin (LIPITOR) tablet 10 mg  10 mg Oral Daily Seth Noriega DO   10 mg at 09/12/21 0814   • carvedilol (COREG) tablet 12.5 mg  12.5 mg Oral BID With Meals Seth Noriega DO   12.5 mg at 09/12/21 5412   • dextrose (D50W) 25 g/ 50mL  Intravenous Solution 25 g  25 g Intravenous Q15 Min PRN Adán Merrill DO       • dextrose (GLUTOSE) oral gel 15 g  15 g Oral Q15 Min PRN Adán Merrill DO       • DULoxetine (CYMBALTA) DR capsule 60 mg  60 mg Oral Daily Seth Noriega DO   60 mg at 09/12/21 0814   • famotidine (PEPCID) tablet 20 mg  20 mg Oral Daily Seth Noriega DO   20 mg at 09/12/21 0814   • glucagon (human recombinant) (GLUCAGEN DIAGNOSTIC) injection 1 mg  1 mg Subcutaneous Q15 Min PRN Adán Merrill DO       • insulin lispro (humaLOG) injection 2-7 Units  2-7 Units Subcutaneous TID AC Adán Merrill DO   5 Units at 09/12/21 1712   • lactulose solution 20 g  20 g Oral TID Seth Noriega DO   20 g at 09/11/21 2231   • levothyroxine (SYNTHROID, LEVOTHROID) tablet 25 mcg  25 mcg Oral Q AM Seth Noriega DO   25 mcg at 09/12/21 0600   • lisinopril (PRINIVIL,ZESTRIL) tablet 20 mg  20 mg Oral Q12H Adán Merrill DO   20 mg at 09/12/21 0814   • nicotine (NICODERM CQ) 14 MG/24HR patch 1 patch  1 patch Transdermal Q24H Seth Noriega DO   1 patch at 09/12/21 0813   • NIFEdipine XL (PROCARDIA XL) 24 hr tablet 60 mg  60 mg Oral BID Adán Merrill DO   60 mg at 09/12/21 0814   • ondansetron (ZOFRAN) injection 4 mg  4 mg Intravenous Q6H PRN Seth Noriega DO       • pregabalin (LYRICA) capsule 75 mg  75 mg Oral Q12H Seth Noriega DO   75 mg at 09/12/21 0817   • rOPINIRole (REQUIP) tablet 4 mg  4 mg Oral Nightly Seth Noriega DO   4 mg at 09/11/21 2231   • sodium chloride 0.9 % flush 10 mL  10 mL Intravenous PRN Naun Fields MD       • sodium chloride 0.9 % flush 10 mL  10 mL Intravenous PRN Naun Fields MD       • sodium chloride 0.9 % flush 10 mL  10 mL Intravenous Q12H Seth Noriega DO   10 mL at 09/12/21 0815   • sodium chloride 0.9 % flush 10 mL  10 mL Intravenous PRN Seth Noriega DO           Past Medical History:  Past Medical History:   Diagnosis Date   • Anxiety    •  Arthritis    • Chronic kidney disease     STAGE V RENAL FAILURE   • Depression    • Diabetes mellitus (CMS/HCC)    • HTN (hypertension)    • Hypothyroidism    • Obesity    • Tremors of nervous system        Past Surgical History:  Past Surgical History:   Procedure Laterality Date   • ARTERIOVENOUS FISTULA     • BREAST BIOPSY     • CARPAL TUNNEL RELEASE     • CATH LAB PROCEDURE     • CHOLECYSTECTOMY     • TUBAL ABDOMINAL LIGATION         Family History  Family History   Problem Relation Age of Onset   • Cancer Other    • Hypertension Other    • Kidney disease Other    • Heart disease Other    • Diabetes Other    • Diabetes Mother    • Diabetes Maternal Aunt    • Diabetes Maternal Grandmother        Social History  Social History     Socioeconomic History   • Marital status:      Spouse name: Not on file   • Number of children: Not on file   • Years of education: Not on file   • Highest education level: Not on file   Tobacco Use   • Smoking status: Current Every Day Smoker     Packs/day: 0.50   • Smokeless tobacco: Never Used   • Tobacco comment: CUTTING BACK AND TRYING TO STOP SMOKING   Substance and Sexual Activity   • Alcohol use: No   • Drug use: No   • Sexual activity: Defer         Review of Systems:  History obtained from chart review and the patient  General ROS: No fever or chills  Respiratory ROS: No cough, shortness of breath, wheezing  Cardiovascular ROS: No chest pain or palpitations  Gastrointestinal ROS: No abdominal pain or melena  Genito-Urinary ROS: No dysuria or hematuria  Psych ROS: No anxiety and depression    Objective:  Patient Vitals for the past 24 hrs:   BP Temp Temp src Pulse Resp SpO2   09/12/21 2008 127/64 98.3 °F (36.8 °C) Oral 63 16 98 %   09/12/21 1600 118/64 98.2 °F (36.8 °C) Oral 78 16 95 %   09/12/21 1100 165/70 98.6 °F (37 °C) Oral 70 16 98 %   09/12/21 0726 (!) 189/74 98.4 °F (36.9 °C) Oral 71 18 100 %   09/12/21 0507 162/80 -- -- -- -- --   09/12/21 0344 (!) 192/84 -- --  72 -- --   09/12/21 0309 (!) 196/70 98 °F (36.7 °C) Oral 72 16 98 %   09/12/21 0000 180/72 98 °F (36.7 °C) Oral 71 16 98 %     No intake or output data in the 24 hours ending 09/12/21 2104  General: awake/alert   Chest:  clear to auscultation bilaterally without respiratory distress  CVS: regular rate and rhythm  Abdominal: soft, nontender, positive bowel sounds  Extremities: no cyanosis or edema  Skin: warm and dry without rash      Labs:  Results from last 7 days   Lab Units 09/12/21  0656 09/11/21  0511 09/11/21  0033   WBC 10*3/mm3 4.95 8.95 8.75   HEMOGLOBIN g/dL 7.2* 7.3* 8.2*   HEMATOCRIT % 23.0* 22.7* 25.8*   PLATELETS 10*3/mm3 157 170 217         Results from last 7 days   Lab Units 09/12/21  0904 09/11/21  0511 09/11/21  0033   SODIUM mmol/L 136 138 141   POTASSIUM mmol/L 4.5 2.9* 2.3*   CHLORIDE mmol/L 97* 96* 96*   CO2 mmol/L 28.0 27.0 27.0   BUN mg/dL 22* 37* 34*   CREATININE mg/dL 2.83* 3.91* 3.98*   CALCIUM mg/dL 8.6 8.5* 9.2   BILIRUBIN mg/dL  --  0.2 0.3   ALK PHOS U/L  --  108 125*   ALT (SGPT) U/L  --  7 9   AST (SGOT) U/L  --  14 15   GLUCOSE mg/dL 175* 122* 40*       Radiology:   Imaging Results (Last 72 Hours)     Procedure Component Value Units Date/Time    CT Head Without Contrast [[id number]] Collected: 09/11/21 0712     Updated: 09/11/21 0717    Narrative:      EXAMINATION:   CT HEAD WO CONTRAST-  9/11/2021 7:12 AM CDT     HISTORY: CT BRAIN without contrast dated 9/11/2021 2:41 AM CDT     HISTORY: Headache     COMPARISON: June 19, 2021      DOSE LENGTH PRODUCT: 607 mGy cm     TECHNIQUE: Serial axial tomographic images of the brain were obtained  without the use of intravenous contrast.      FINDINGS:   The midline structures are nondisplaced. The ventricles and basilar  cisterns are normal in size and configuration. There is no evidence of  intracranial hemorrhage or mass-effect. The gray-white matter  differentiation is maintained. The sulci have a normal configuration,  and there are no  abnormal extra-axial fluid collections. The structures  of the posterior fossa are unremarkable.      The included orbits and their contents are unremarkable. The visualized  paranasal sinuses, mastoid air cells and middle ear cavities are clear.  The visualized osseous structures and overlying soft tissues of the  skull and face are unremarkable.        Impression:      1. No acute intracranial process.        This report was finalized on 09/11/2021 07:14 by Dr. Lazaro Martinez MD.    XR Chest 1 View [[id number]] Collected: 09/11/21 0712     Updated: 09/11/21 0715    Narrative:      EXAM: XR CHEST 1 - 9/11/2021 2:12 AM CDT     HISTORY: central line placement       COMPARISON: September 11, 2021 at 12:40 AM.     TECHNIQUE: Frontal radiograph of the chest     FINDINGS:   The lungs are clear. Cardiac silhouettes mildly enlarged. Right internal  jugular catheters satisfactorily position in superior vena cava..      The osseous structures and surrounding soft tissues demonstrate no acute  abnormality.          Impression:      1. Mild cardiomegaly with no evidence pulmonary vascular congestion.        This report was finalized on 09/11/2021 07:12 by Dr. Lazaro Martinez MD.    XR Chest 1 View [[id number]] Collected: 09/11/21 0654     Updated: 09/11/21 0658    Narrative:      EXAM: XR CHEST 1 - 9/11/2021 12:54 AM CDT     HISTORY: Chest pain       COMPARISON: August 17, 2021.     TECHNIQUE: Frontal radiograph of the chest     FINDINGS:   Vague groundglass infiltrates noted in the right lung base.. Cardiac  silhouettes moderately enlarged..      The osseous structures and surrounding soft tissues demonstrate no acute  abnormality.          Impression:      1. Vague groundglass infiltrate right lung base. This is suspicious for  an early inflammatory process.        This report was finalized on 09/11/2021 06:55 by Dr. Lazaro Martinez MD.          Culture:  No results found for: BLOODCX, URINECX, WOUNDCX, MRSACX, RESPCX,  STOOLCX      Assessment   End-stage renal disease  Hypertension  Hypokalemia  Anemia chronic kidney disease  Secondary hyperparathyroidism  Diabetes type 2 with hypoglycemia on admit     Plan:  Discussed with patient, nursing  Monitor labs   Dialysis Tuesday Thursday Saturday per routine schedule  K controlled  Restarting bp meds         Surjit Avitia MD  9/12/2021  21:04 CDT

## 2021-09-14 NOTE — OUTREACH NOTE
Prep Survey      Responses   [facility] facility patient discharged from?  [place]   Is LACE score < 7 ?  No   Emergency Room discharge w/ pulse ox?  No   Eligibility  Readm Mgmt   Discharge diagnosis  Altered mental status   Does the patient have one of the following disease processes/diagnoses(primary or secondary)?  Other   Does the patient have Home health ordered?  No   Is there a DME ordered?  No   Prep survey completed?  Yes          Eri Helms RN

## 2021-09-15 NOTE — OUTREACH NOTE
Medical Week 1 Survey      Responses   [facility] patient discharged from?  [place]   Does the patient have one of the following disease processes/diagnoses(primary or secondary)?  Other   Week 1 attempt successful?  No   Unsuccessful attempts  Attempt 1          Jenny Wiseman RN

## 2021-09-16 PROBLEM — J81.0 ACUTE PULMONARY EDEMA (HCC): Status: ACTIVE | Noted: 2021-01-01

## 2021-09-16 PROBLEM — I16.1 HYPERTENSIVE EMERGENCY: Status: ACTIVE | Noted: 2021-01-01

## 2021-09-16 NOTE — H&P
[facility] Medicine Services  HISTORY AND PHYSICAL    Date of Admission: 9/16/2021  Primary Care Physician: Yaron Wiggins APRN    Subjective     Chief Complaint: Shortness of breath    History of Present Illness  51 year old female with PMH of ESRD, DM, HTN, poor compliance that presents to the ER 3 days post discharge with complaints of shortness of breath. Presented in respiratory distress that required BiPAP, blood pressure on presentation was 240/125. She was started on Cardene and Tridil drips for control. Feels better after initial interventions. A central and arterial lines were placed in the ER.     Review of Systems   Otherwise complete ROS reviewed and negative except as mentioned in the HPI.    Past Medical History:   Past Medical History:   Diagnosis Date   • Anxiety    • Arthritis    • Chronic kidney disease     STAGE V RENAL FAILURE   • Depression    • Diabetes mellitus (CMS/HCC)    • HTN (hypertension)    • Hypothyroidism    • Obesity    • Tremors of nervous system      Past Surgical History:  Past Surgical History:   Procedure Laterality Date   • ARTERIOVENOUS FISTULA     • BREAST BIOPSY     • CARPAL TUNNEL RELEASE     • CATH LAB PROCEDURE     • CHOLECYSTECTOMY     • TUBAL ABDOMINAL LIGATION       Social History:  reports that she has been smoking. She has been smoking about 0.50 packs per day. She has never used smokeless tobacco. She reports that she does not drink alcohol and does not use drugs.    Family History: family history includes Cancer in an other family member; Diabetes in her maternal aunt, maternal grandmother, mother, and another family member; Heart disease in an other family member; Hypertension in an other family member; Kidney disease in an other family member.       Allergies:  Allergies   Allergen Reactions   • Penicillins Hives and Shortness Of Breath   • Lamisil [Terbinafine]    • Reglan [Metoclopramide] GI Intolerance   • Stadol  [Butorphanol] Nausea And Vomiting       Medications:  Prior to Admission medications    Medication Sig Start Date End Date Taking? Authorizing Provider   albuterol (PROVENTIL HFA;VENTOLIN HFA) 108 (90 BASE) MCG/ACT inhaler Inhale 2 puffs Every 4 (Four) Hours As Needed for wheezing.    Blanche Neil MD   carvedilol (COREG) 12.5 MG tablet Take 1 tablet by mouth 2 (Two) Times a Day With Meals. 6/29/21   Adán Merrill DO   cloNIDine (CATAPRES) 0.2 MG tablet Take 1 tablet by mouth Every 8 (Eight) Hours.  Patient taking differently: Take 0.3 mg by mouth Every 8 (Eight) Hours. 6/29/21   Adán Merrill DO   DULoxetine (CYMBALTA) 60 MG capsule Take 60 mg by mouth daily.    Blanche Neil MD   famotidine (PEPCID) 20 MG tablet Take 1 tablet by mouth Daily. 8/21/21   Narinder Madrid MD   hydrALAZINE (APRESOLINE) 50 MG tablet Take 50 mg by mouth 3 (Three) Times a Day.    Blanche Neil MD   insulin aspart (NovoLOG FlexPen) 100 UNIT/ML solution pen-injector sc pen Inject 10 Units under the skin into the appropriate area as directed 3 (Three) Times a Day With Meals.    Blanche Neil MD   Insulin Glargine (BASAGLAR KWIKPEN) 100 UNIT/ML injection pen Inject 40 Units under the skin into the appropriate area as directed Every Night.    Blanche Neil MD   lactulose (CHRONULAC) 10 GM/15ML solution Take 20 g by mouth 3 (Three) Times a Day.    Blanche Neil MD   levothyroxine (SYNTHROID, LEVOTHROID) 25 MCG tablet Take 25 mcg by mouth Daily.    Blanche Neil MD   lisinopril (PRINIVIL,ZESTRIL) 20 MG tablet Take 20 mg by mouth 2 (two) times a day.    Blanche Neil MD   nicotine (NICODERM CQ) 14 MG/24HR patch Place 1 patch on the skin as directed by provider Daily. Take off it pt smoke. 8/21/21   Narinder Madrid MD   NIFEdipine XL (PROCARDIA XL) 60 MG 24 hr tablet Take 60 mg by mouth 2 (two) times a day.    Blanche Neil MD   ondansetron (ZOFRAN) 4 MG tablet Take 4  "mg by mouth Every 8 (Eight) Hours As Needed for nausea or vomiting.    Blanche Neil MD   pregabalin (LYRICA) 75 MG capsule Take 75 mg by mouth 2 (Two) Times a Day.    Blanche Neil MD   rOPINIRole (REQUIP) 4 MG tablet Take 4 mg by mouth Every Night.    Blanche Neil MD   senna (SENOKOT) 8.6 MG tablet tablet Take 1 tablet by mouth Every Night.    Blanche Neil MD   simvastatin (ZOCOR) 20 MG tablet Take 40 mg by mouth Daily.    Blanche Neil MD I have utilized all available immediate resources to obtain, update, and review the patient's current medications.    Objective     Vital Signs: /81   Pulse 96   Temp 98.6 °F (37 °C)   Resp 21   Ht 160 cm (63\")   Wt 84 kg (185 lb 3 oz)   LMP  (LMP Unknown)   SpO2 100%   BMI 32.80 kg/m²   Physical Exam  Vitals reviewed.   Constitutional:       General: She is in acute distress.      Appearance: She is well-developed. She is not toxic-appearing or diaphoretic.   HENT:      Head: Normocephalic and atraumatic.      Right Ear: External ear normal.      Left Ear: External ear normal.      Nose: Nose normal.      Mouth/Throat:      Mouth: Mucous membranes are dry.      Pharynx: Oropharynx is clear.   Eyes:      General:         Right eye: No discharge.         Left eye: No discharge.      Extraocular Movements: Extraocular movements intact.      Conjunctiva/sclera: Conjunctivae normal.      Pupils: Pupils are equal, round, and reactive to light.   Neck:      Vascular: No JVD.   Cardiovascular:      Rate and Rhythm: Normal rate and regular rhythm.      Pulses: Normal pulses.      Heart sounds: Murmur heard.   No friction rub. No gallop.    Pulmonary:      Effort: Respiratory distress present.      Breath sounds: No stridor. Wheezing and rales present. No rhonchi.   Chest:      Chest wall: No tenderness.   Abdominal:      General: Bowel sounds are normal. There is no distension.      Palpations: Abdomen is soft.      Tenderness: " There is no abdominal tenderness. There is no guarding or rebound.      Hernia: No hernia is present.   Musculoskeletal:         General: No swelling, tenderness or deformity. Normal range of motion.      Cervical back: Normal range of motion and neck supple. No rigidity or tenderness. No muscular tenderness.      Right lower leg: Edema present.      Left lower leg: Edema present.   Skin:     General: Skin is warm and dry.      Capillary Refill: Capillary refill takes less than 2 seconds.      Findings: No erythema or rash.   Neurological:      General: No focal deficit present.      Mental Status: She is alert and oriented to person, place, and time.      Cranial Nerves: No cranial nerve deficit.      Sensory: No sensory deficit.      Motor: No weakness or abnormal muscle tone.      Deep Tendon Reflexes: Reflexes normal.     Results Reviewed:  Lab Results (last 24 hours)     Procedure Component Value Units Date/Time    [place] Draw [[id number]] Collected: 09/16/21 0435    Specimen: Blood Updated: 09/16/21 0545    Narrative:      The following orders were created for panel order [place] Draw.  Procedure                               Abnormality         Status                     ---------                               -----------         ------                     Green Top (Gel)[[id number]]                                  Final result               Lavender Top[[id number]]                                     Final result               Red Top[[id number]]                                          Final result               [place] Blood Culture [place]...[[id number]]                      Final result               Gray Top[[id number]]                                         In process                 Light Blue Top[[id number]]                                   Final result                 Please view results for these tests on the individual orders.    [facility] Digital Keys Blood Culture Bottle Set [[id number]] Collected: 09/16/21 0434     Specimen: Blood from Arm, Right Updated: 09/16/21 0545     Extra Tube Hold for add-ons.     Comment: Auto resulted.       Green Top (Gel) [[id number]] Collected: 09/16/21 0435    Specimen: Blood Updated: 09/16/21 0545     Extra Tube Hold for add-ons.     Comment: Auto resulted.       Red Top [[id number]] Collected: 09/16/21 0435    Specimen: Blood Updated: 09/16/21 0545     Extra Tube Hold for add-ons.     Comment: Auto resulted.       Light Blue Top [[id number]] Collected: 09/16/21 0435    Specimen: Blood Updated: 09/16/21 0545     Extra Tube hold for add-on     Comment: Auto resulted       Lavender Top [[id number]] Collected: 09/16/21 0435    Specimen: Blood Updated: 09/16/21 0545     Extra Tube hold for add-on     Comment: Auto resulted       COVID PRE-OP / PRE-PROCEDURE SCREENING ORDER (NO ISOLATION) - Swab, Nasal Cavity [[id number]]  (Normal) Collected: 09/16/21 0446    Specimen: Swab from Nasal Cavity Updated: 09/16/21 0535    Narrative:      The following orders were created for panel order COVID PRE-OP / PRE-PROCEDURE SCREENING ORDER (NO ISOLATION) - Swab, Nasal Cavity.  Procedure                               Abnormality         Status                     ---------                               -----------         ------                     COVID-19,[facility] IN-RONNIE...[[id number]]  Normal              Final result                 Please view results for these tests on the individual orders.    COVID-19,[facility] IN-HOUSE,Nasal Swab No Transport Media 3-4 HR TAT - Swab, Nasal Cavity [[id number]]  (Normal) Collected: 09/16/21 0446    Specimen: Swab from Nasal Cavity Updated: 09/16/21 0535     COVID19 Not Detected    Narrative:      Fact sheet for providers: https://www.fda.gov/media/051143/download     Fact sheet for patients: https://www.fda.gov/media/003551/download    Test performed by PCR.    Consider negative results in combination with clinical observations, patient history, and epidemiological information.     BNP [[id number]]  (Abnormal) Collected: 09/16/21 0435    Specimen: Blood Updated: 09/16/21 0523     proBNP 54,505.0 pg/mL     Narrative:      Among patients with dyspnea, NT-proBNP is highly sensitive for the detection of acute congestive heart failure. In addition NT-proBNP of <300 pg/ml effectively rules out acute congestive heart failure with 99% negative predictive value.    Results may be falsely decreased if patient taking Biotin.      Troponin [[id number]]  (Abnormal) Collected: 09/16/21 0435    Specimen: Blood Updated: 09/16/21 0521     Troponin T 0.188 ng/mL     Narrative:      Troponin T Reference Range:  <= 0.03 ng/mL-   Negative for AMI  >0.03 ng/mL-     Abnormal for myocardial necrosis.  Clinicians would have to utilize clinical acumen, EKG, Troponin and serial changes to determine if it is an Acute Myocardial Infarction or myocardial injury due to an underlying chronic condition.       Results may be falsely decreased if patient taking Biotin.      Comprehensive Metabolic Panel [[id number]]  (Abnormal) Collected: 09/16/21 0435    Specimen: Blood Updated: 09/16/21 0521     Glucose 566 mg/dL      BUN 41 mg/dL      Creatinine 4.21 mg/dL      Sodium 131 mmol/L      Potassium 5.1 mmol/L      Chloride 89 mmol/L      CO2 24.0 mmol/L      Calcium 8.8 mg/dL      Total Protein 7.8 g/dL      Albumin 4.10 g/dL      ALT (SGPT) 40 U/L      AST (SGOT) 81 U/L      Alkaline Phosphatase 205 U/L      Total Bilirubin 0.6 mg/dL      eGFR Non African Amer 11 mL/min/1.73      Comment: <15 Indicative of kidney failure.        eGFR   Amer --     Comment: <15 Indicative of kidney failure.        Globulin 3.7 gm/dL      A/G Ratio 1.1 g/dL      BUN/Creatinine Ratio 9.7     Anion Gap 18.0 mmol/L     Narrative:      GFR Normal >60  Chronic Kidney Disease <60  Kidney Failure <15      Magnesium [[id number]]  (Abnormal) Collected: 09/16/21 0435    Specimen: Blood Updated: 09/16/21 0501     Magnesium 2.7 mg/dL     aPTT [[id number]]   (Normal) Collected: 09/16/21 0435    Specimen: Blood Updated: 09/16/21 0452     PTT 33.1 seconds     Protime-INR [[id number]]  (Abnormal) Collected: 09/16/21 0435    Specimen: Blood Updated: 09/16/21 0452     Protime 15.4 Seconds      INR 1.27    CBC & Differential [[id number]]  (Abnormal) Collected: 09/16/21 0435    Specimen: Blood Updated: 09/16/21 0444    Narrative:      The following orders were created for panel order CBC & Differential.  Procedure                               Abnormality         Status                     ---------                               -----------         ------                     CBC Auto Differential[[id number]]        Abnormal            Final result                 Please view results for these tests on the individual orders.    CBC Auto Differential [[id number]]  (Abnormal) Collected: 09/16/21 0435    Specimen: Blood Updated: 09/16/21 0444     WBC 7.78 10*3/mm3      RBC 2.86 10*6/mm3      Hemoglobin 8.7 g/dL      Hematocrit 26.9 %      MCV 94.1 fL      MCH 30.4 pg      MCHC 32.3 g/dL      RDW 15.6 %      RDW-SD 54.0 fl      MPV 9.5 fL      Platelets 191 10*3/mm3      Neutrophil % 70.8 %      Lymphocyte % 20.1 %      Monocyte % 6.4 %      Eosinophil % 1.3 %      Basophil % 1.0 %      Immature Grans % 0.4 %      Neutrophils, Absolute 5.51 10*3/mm3      Lymphocytes, Absolute 1.56 10*3/mm3      Monocytes, Absolute 0.50 10*3/mm3      Eosinophils, Absolute 0.10 10*3/mm3      Basophils, Absolute 0.08 10*3/mm3      Immature Grans, Absolute 0.03 10*3/mm3      nRBC 0.0 /100 WBC     Blood Gas, Arterial With Co-Ox [[id number]]  (Abnormal) Collected: 09/16/21 0435    Specimen: Arterial Blood Updated: 09/16/21 0443     Site Left Radial     Aaron's Test N/A     pH, Arterial 7.385 pH units      pCO2, Arterial 45.1 mm Hg      Comment: 83 Value above reference range        pO2, Arterial 113.0 mm Hg      Comment: 83 Value above reference range        HCO3, Arterial 27.0 mmol/L      Comment: 83 Value  above reference range        Base Excess, Arterial 1.7 mmol/L      O2 Saturation, Arterial 99.1 %      Comment: 83 Value above reference range        Hemoglobin, Blood Gas 8.7 g/dL      Comment: 84 Value below reference range        Hematocrit, Blood Gas 26.7 %      Comment: 84 Value below reference range        Oxyhemoglobin 94.2 %      Methemoglobin 1.00 %      Carboxyhemoglobin 4.0 %      A-a Gradiant 544.6 mmHg      Temperature 37.0 C      Sodium, Arterial 134 mmol/L      Comment: 84 Value below reference range        Potassium, Arterial 4.9 mmol/L      Barometric Pressure for Blood Gas 750 mmHg      Modality BiPap     FIO2 100 %      Ventilator Mode NA     IPAP 16     Comment: Meter: [id number]     :  [id number]        EPAP 8     Note --     Collected by [id number]     pH, Temp Corrected 7.385 pH Units      pCO2, Temperature Corrected 45.1 mm Hg      pO2, Temperature Corrected 113 mm Hg     [place] Top [[id number]] Collected: 09/16/21 0435    Specimen: Blood Updated: 09/16/21 0440        Imaging Results (Last 24 Hours)     Procedure Component Value Units Date/Time    XR Chest 1 View [[id number]] Resulted: 09/16/21 0553     Updated: 09/16/21 0554    XR Chest 1 View [[id number]] Resulted: 09/16/21 0442     Updated: 09/16/21 0443        I have personally reviewed and interpreted the radiology studies and ECG obtained at time of admission.     Assessment / Plan     Assessment:   Active Hospital Problems    Diagnosis    • **Hypertensive emergency    • Gastroparesis due to DM (CMS/[facility])    • Acute pulmonary edema (CMS/[facility])    • Hypothyroidism    • Uncontrolled diabetes mellitus (CMS/[facility])    • End stage kidney disease (CMS/[facility])    • Seizure (CMS/[facility])    • Intractable nausea and vomiting      Plan:   Admit to critical care  Vitals per unit  Npo   Continue Tridil and Cardene drips to maintain blood pressure  Restart home Procardia, Lisinopril, Hydralazine, Clonidine and Coreg  BiPAP to nasal canula when distress  resolved possibly in early AM  Repeat blood gas  Nephrology consult for HD  Insulin high dose sliding scale  Continue home dosed Levemir at 40 units daily    Compliance with medications advised    Code Status/Advanced Care Plan: Full    The patient's surrogate decision maker is see records.     I discussed my findings and recommendations with the patient.    Estimated length of stay is over 2 midnights.     The patient was seen and examined by me on 9/16/2021 at 0601.    Electronically signed by Ken Moon MD, 09/16/21, 07:01 CDT.

## 2021-09-16 NOTE — ED NOTES
PT VOMITED IN BIPAP MASK.   MASK REMOVED PT'S MOUTH SUCTIONED     Nasima Brenner RN  09/16/21 0594

## 2021-09-16 NOTE — CONSULTS
"Nephrology ([facility]) Consult Note      Patient:  Ena Upton  YOB: 1970  Date of Service: 9/16/2021  MRN: [id number]   Acct: [id number]   Primary Care Physician: Yaron Wiggins APRN  Advance Directive:   Code Status and Medical Interventions:   Ordered at: 09/16/21 0716     Code Status:    CPR     Medical Interventions (Level of Support Prior to Arrest):    Full     Admit Date: 9/16/2021       Hospital Day: 0  Referring Provider: Ken Moon,*      Patient personally seen and examined.  Complete chart including Consults, Notes, Operative Reports, Labs, Cardiology, and Radiology studies reviewed as able.        Subjective:  Ena Upton is a 51 y.o. female  whom we were consulted for end stage renal disease. Maintenance hemodialysis Tuesday Thursday Saturday. Just discharged from hospital three days ago on 9/13 after being treated for hypoglycemia. Patient is currently very lethargic but shakes head \"no\" when asked if she went to dialysis on 9/14.  Presented to ER this time with dyspnea, elevated blood pressure. Placed on NTG and Cardene drips for BP control. Currently is in CCU. Very drowsy, will open eyes to voic and nod yes/no but is otherwise nonverbal. No other history able to be provided by patient.  Maintaining adequate SaO2 on nasal cannula.     Allergies:  Penicillins, Lamisil [terbinafine], Reglan [metoclopramide], and Stadol [butorphanol]    Home Meds:  Medications Prior to Admission   Medication Sig Dispense Refill Last Dose   • albuterol (PROVENTIL HFA;VENTOLIN HFA) 108 (90 BASE) MCG/ACT inhaler Inhale 2 puffs Every 4 (Four) Hours As Needed for wheezing.      • carvedilol (COREG) 12.5 MG tablet Take 1 tablet by mouth 2 (Two) Times a Day With Meals.      • cloNIDine (CATAPRES) 0.2 MG tablet Take 1 tablet by mouth Every 8 (Eight) Hours. (Patient taking differently: Take 0.3 mg by mouth Every 8 (Eight) Hours.)      • DULoxetine (CYMBALTA) 60 " MG capsule Take 60 mg by mouth daily.      • famotidine (PEPCID) 20 MG tablet Take 1 tablet by mouth Daily. 90 tablet 0    • hydrALAZINE (APRESOLINE) 50 MG tablet Take 50 mg by mouth 3 (Three) Times a Day.      • insulin aspart (NovoLOG FlexPen) 100 UNIT/ML solution pen-injector sc pen Inject 10 Units under the skin into the appropriate area as directed 3 (Three) Times a Day With Meals.      • Insulin Glargine (BASAGLAR KWIKPEN) 100 UNIT/ML injection pen Inject 40 Units under the skin into the appropriate area as directed Every Night.      • lactulose (CHRONULAC) 10 GM/15ML solution Take 20 g by mouth 3 (Three) Times a Day.      • levothyroxine (SYNTHROID, LEVOTHROID) 25 MCG tablet Take 25 mcg by mouth Daily.      • lisinopril (PRINIVIL,ZESTRIL) 20 MG tablet Take 20 mg by mouth 2 (two) times a day.      • nicotine (NICODERM CQ) 14 MG/24HR patch Place 1 patch on the skin as directed by provider Daily. Take off it pt smoke. 30 patch 2    • NIFEdipine XL (PROCARDIA XL) 60 MG 24 hr tablet Take 60 mg by mouth 2 (two) times a day.      • ondansetron (ZOFRAN) 4 MG tablet Take 4 mg by mouth Every 8 (Eight) Hours As Needed for nausea or vomiting.      • pregabalin (LYRICA) 75 MG capsule Take 75 mg by mouth 2 (Two) Times a Day.      • rOPINIRole (REQUIP) 4 MG tablet Take 4 mg by mouth Every Night.      • senna (SENOKOT) 8.6 MG tablet tablet Take 1 tablet by mouth Every Night.      • simvastatin (ZOCOR) 20 MG tablet Take 40 mg by mouth Daily.          Medicines:  Current Facility-Administered Medications   Medication Dose Route Frequency Provider Last Rate Last Admin   • atorvastatin (LIPITOR) tablet 10 mg  10 mg Oral Daily Ken Moon MD       • carvedilol (COREG) tablet 12.5 mg  12.5 mg Oral BID With Meals Ken Moon MD       • cloNIDine (CATAPRES) tablet 0.3 mg  0.3 mg Oral Q8H Ken Moon MD       • dextrose (D50W) 25 g/ 50mL Intravenous Solution 25 g  25 g Intravenous Q15 Min PRN  Ken Moon MD       • dextrose (GLUTOSE) oral gel 15 g  15 g Oral Q15 Min PRN Ken Moon MD       • DULoxetine (CYMBALTA) DR capsule 60 mg  60 mg Oral Daily Ken Moon MD       • [START ON 9/17/2021] famotidine (PEPCID) injection 20 mg  20 mg Intravenous Daily Narinder Madrid MD       • glucagon (human recombinant) (GLUCAGEN DIAGNOSTIC) injection 1 mg  1 mg Subcutaneous Q15 Min PRN Ken Moon MD       • hydrALAZINE (APRESOLINE) tablet 50 mg  50 mg Oral TID Ken Moon MD       • insulin detemir (LEVEMIR) injection 40 Units  40 Units Subcutaneous Nightly Ken Moon MD       • insulin lispro (humaLOG) injection 0-24 Units  0-24 Units Subcutaneous TID AC Ken Moon MD       • insulin lispro (humaLOG) injection 5 Units  5 Units Subcutaneous TID With Meals Ken Moon MD       • ipratropium-albuterol (DUO-NEB) nebulizer solution 3 mL  3 mL Nebulization Q6H - RT Ken Moon MD       • ipratropium-albuterol (DUO-NEB) nebulizer solution 3 mL  3 mL Nebulization Q6H PRN Ken Moon MD       • lactulose (CHRONULAC) 10 GM/15ML solution 20 g  20 g Oral BID Ken Moon MD       • levothyroxine (SYNTHROID, LEVOTHROID) tablet 25 mcg  25 mcg Oral Q AM Ken Moon MD       • lisinopril (PRINIVIL,ZESTRIL) tablet 20 mg  20 mg Oral Q24H Ken Moon MD       • niCARdipine (CARDENE) 25 mg in 250 mL NS infusion  5-15 mg/hr Intravenous Titrated Ken Moon MD 75 mL/hr at 09/16/21 0907 7.5 mg/hr at 09/16/21 0907   • NIFEdipine XL (PROCARDIA XL) 24 hr tablet 60 mg  60 mg Oral Q24H Ken Moon MD       • nitroglycerin (TRIDIL) 200 mcg/ml infusion  100 mcg/min Intravenous Titrated Ken Moon MD 39 mL/hr at 09/16/21 0904 130 mcg/min at 09/16/21 0904   • ondansetron (ZOFRAN) injection 4 mg  4 mg Intravenous Q6H PRN Narinder Madrid MD        • pregabalin (LYRICA) capsule 75 mg  75 mg Oral Daily Ken Moon MD       • rOPINIRole (REQUIP) tablet 4 mg  4 mg Oral Nightly Ken Moon MD       • sodium chloride 0.9 % flush 10 mL  10 mL Intravenous PRN Ken Moon MD       • sodium chloride 0.9 % flush 10 mL  10 mL Intravenous Q12H Ken Moon MD       • sodium chloride 0.9 % flush 10 mL  10 mL Intravenous PRN Ken Moon MD           Past Medical History:  Past Medical History:   Diagnosis Date   • Anxiety    • Arthritis    • Chronic kidney disease     STAGE V RENAL FAILURE   • Depression    • Diabetes mellitus (CMS/HCC)    • HTN (hypertension)    • Hypothyroidism    • Obesity    • Tremors of nervous system        Past Surgical History:  Past Surgical History:   Procedure Laterality Date   • ARTERIOVENOUS FISTULA     • BREAST BIOPSY     • CARPAL TUNNEL RELEASE     • CATH LAB PROCEDURE     • CHOLECYSTECTOMY     • TUBAL ABDOMINAL LIGATION         Family History  Family History   Problem Relation Age of Onset   • Cancer Other    • Hypertension Other    • Kidney disease Other    • Heart disease Other    • Diabetes Other    • Diabetes Mother    • Diabetes Maternal Aunt    • Diabetes Maternal Grandmother        Social History  Social History     Socioeconomic History   • Marital status:      Spouse name: Not on file   • Number of children: Not on file   • Years of education: Not on file   • Highest education level: Not on file   Tobacco Use   • Smoking status: Current Every Day Smoker     Packs/day: 0.50   • Smokeless tobacco: Never Used   • Tobacco comment: CUTTING BACK AND TRYING TO STOP SMOKING   Substance and Sexual Activity   • Alcohol use: No   • Drug use: No   • Sexual activity: Defer         Review of Systems:  History obtained from chart review and the patient  General ROS: No fever or chills  Respiratory ROS: No cough, shortness of breath, wheezing  Cardiovascular ROS: No chest  "pain or palpitations  Gastrointestinal ROS: No abdominal pain or melena  Genito-Urinary ROS: No dysuria or hematuria  Psych ROS: No anxiety and depression  14 point ROS reviewed with the patient and negative except as noted above and in the HPI unless unable to obtain.      Objective:  Patient Vitals for the past 24 hrs:   BP Temp Temp src Pulse Resp SpO2 Height Weight   09/16/21 0900 126/65 -- -- 78 -- 91 % -- --   09/16/21 0845 -- -- -- 79 -- 94 % -- --   09/16/21 0830 -- -- -- 78 -- 92 % -- --   09/16/21 0815 120/69 100.2 °F (37.9 °C) Axillary 80 18 97 % -- --   09/16/21 0732 -- -- -- 79 -- 100 % -- --   09/16/21 0731 131/64 -- -- 79 -- -- -- --   09/16/21 0716 136/66 -- -- 82 -- -- -- --   09/16/21 0715 -- -- -- 82 -- 100 % -- --   09/16/21 0714 158/57 -- -- 82 20 100 % -- --   09/16/21 0645 151/72 -- -- 90 15 100 % -- --   09/16/21 0633 152/77 -- -- 92 20 100 % -- --   09/16/21 0611 170/81 -- -- 96 21 100 % -- --   09/16/21 0556 170/81 -- -- -- -- 100 % -- --   09/16/21 0551 -- -- -- 98 20 -- -- --   09/16/21 0525 -- -- -- 90 23 100 % -- --   09/16/21 0501 (!) 241/111 -- -- 84 18 100 % -- --   09/16/21 0451 (!) 247/113 -- -- 86 21 100 % -- --   09/16/21 0445 (!) 247/113 -- -- 86 23 100 % -- --   09/16/21 0441 (!) 239/125 -- -- -- 28 -- 160 cm (63\") 84 kg (185 lb 3 oz)   09/16/21 0431 -- 98.6 °F (37 °C) -- 81 23 (!) 85 % -- --     No intake or output data in the 24 hours ending 09/16/21 0909  General: lethargic   Neck: supple, no JVD  Chest:  clear to auscultation bilaterally without respiratory distress  CVS: regular rate and rhythm  Abdominal: soft, nontender, positive bowel sounds  Extremities: bilateral 1+ lower ext edema  Skin: warm and dry without rash  Neuro: no focal motor deficits    Labs:  Results from last 7 days   Lab Units 09/16/21  0435 09/12/21  0656 09/11/21  0511   WBC 10*3/mm3 7.78 4.95 8.95   HEMOGLOBIN g/dL 8.7* 7.2* 7.3*   HEMATOCRIT % 26.9* 23.0* 22.7*   PLATELETS 10*3/mm3 191 157 170 "         Results from last 7 days   Lab Units 09/16/21  0435 09/12/21  0904 09/11/21  0511 09/11/21  0511 09/11/21  0033 09/11/21  0033   SODIUM mmol/L 131* 136   < > 138   < > 141   SODIUM, ARTERIAL mmol/L 134*  --   --   --   --   --    POTASSIUM mmol/L 5.1 4.5  --  2.9*   < > 2.3*   CHLORIDE mmol/L 89* 97*  --  96*   < > 96*   CO2 mmol/L 24.0 28.0  --  27.0   < > 27.0   BUN mg/dL 41* 22*  --  37*   < > 34*   CREATININE mg/dL 4.21* 2.83*  --  3.91*   < > 3.98*   CALCIUM mg/dL 8.8 8.6  --  8.5*   < > 9.2   BILIRUBIN mg/dL 0.6  --   --  0.2  --  0.3   ALK PHOS U/L 205*  --   --  108  --  125*   ALT (SGPT) U/L 40*  --   --  7  --  9   AST (SGOT) U/L 81*  --   --  14  --  15   GLUCOSE mg/dL 566* 175*  --  122*   < > 40*    < > = values in this interval not displayed.       Radiology:   Imaging Results (Last 72 Hours)     Procedure Component Value Units Date/Time    XR Chest 1 View [[id number]] Collected: 09/16/21 0742     Updated: 09/16/21 0747    Narrative:      Frontal supine radiograph of the chest 9/16/2021 4:42 AM CDT     History: soa     Comparison: Chest x-ray dated 9/11/2021.      Findings:   Right IJ catheter has been removed. Subclavian stent on the left and  that along the left humerus.. Mild to moderate cardiomegaly.  Interstitial edema.. No measurable pneumothorax. Trace pleural effusions  suspected..       No acute osseous or soft tissue abnormality is noted.        Impression:      Impression:   1.   Findings of fluid overload..        This report was finalized on 09/16/2021 07:44 by Dr. Anabela Washington MD.    XR Chest 1 View [[id number]] Collected: 09/16/21 0738     Updated: 09/16/21 0744    Narrative:      Frontal supine radiograph of the chest 9/16/2021 5:53 AM CDT     History: CENTRAL LINE PLACEMENT; I16.1-Hypertensive emergency;  J81.0-Acute pulmonary edema; N18.9-Chronic kidney disease, unspecified;  R73.9-Hyperglycemia, unspecified; J96.01-Acute respiratory failure with  hypoxia     Comparison:  Chest x-ray dated 9/16/2021      Findings:   There is a new right IJ catheters which terminates in the right atrium.  Left subclavian stent is redemonstrated. Partial visualization of the  vascular stent along the left humerus.. Bilateral central parenchymal  opacities in the left lower lung base opacity are grossly similar..  Moderate cardiomegaly..       No acute osseous or soft tissue abnormality is noted.        Impression:      Impression:   1.   Findings remain concerning for fluid overload.  2.  New right IJ catheter, without measurable pneumothorax.        This report was finalized on 09/16/2021 07:41 by Dr. Anabela Washington MD.          Culture:  No results found for: BLOODCX, URINECX, WOUNDCX, MRSACX, RESPCX, STOOLCX      Assessment   1.  End stage renal disease on maintenance HD TTS  2.  Hypertensive urgency  3.  Type 2 diabetes  4.  Anemia of CKD  5.  Secondary hyperparathyroidism    Plan:  1.  Dialysis today  2.  Wean Tridil as able  3.  Further assessment and plan pending Dr Ann's evaluation of patient      Thank you for the consult, we appreciate the opportunity to provide care to your patients.  Feel free to contact me if I can be of any further assistance.      Delmar Salazar, APRN  9/16/2021  09:09 CDT

## 2021-09-16 NOTE — ED PROVIDER NOTES
Subjective   Patient in severe respiratory distress on dialysis has not missed any dialysis came into the ER with a hypertensive emergency hypoxic respiratory failure      History provided by:  EMS personnel  History limited by:  Severe respiratory distress  Shortness of Breath  Severity:  Severe  Onset quality:  Gradual  Timing:  Constant  Progression:  Worsening  Chronicity:  Chronic  Context: activity    Context: not animal exposure, not emotional upset, not fumes and not pollens    Relieved by:  Nothing  Worsened by:  Exertion  Ineffective treatments:  Diuretics  Associated symptoms: cough and PND    Associated symptoms: no abdominal pain, no chest pain, no claudication, no diaphoresis, no headaches, no hemoptysis, no neck pain, no sputum production, no syncope and no vomiting    Risk factors: no recent alcohol use, no family hx of DVT and no hx of PE/DVT        Review of Systems   Unable to perform ROS: Severe respiratory distress   Constitutional: Negative for diaphoresis.   Respiratory: Positive for cough and shortness of breath. Negative for hemoptysis and sputum production.    Cardiovascular: Positive for PND. Negative for chest pain, claudication and syncope.   Gastrointestinal: Negative for abdominal pain and vomiting.   Musculoskeletal: Negative for neck pain.   Neurological: Negative for headaches.       Past Medical History:   Diagnosis Date   • Anxiety    • Arthritis    • Chronic kidney disease     STAGE V RENAL FAILURE   • Depression    • Diabetes mellitus (CMS/HCC)    • HTN (hypertension)    • Hypothyroidism    • Obesity    • Tremors of nervous system        Allergies   Allergen Reactions   • Penicillins Hives and Shortness Of Breath   • Lamisil [Terbinafine]    • Reglan [Metoclopramide] GI Intolerance   • Stadol [Butorphanol] Nausea And Vomiting       Past Surgical History:   Procedure Laterality Date   • ARTERIOVENOUS FISTULA     • BREAST BIOPSY     • CARPAL TUNNEL RELEASE     • CATH LAB PROCEDURE      • CHOLECYSTECTOMY     • TUBAL ABDOMINAL LIGATION         Family History   Problem Relation Age of Onset   • Cancer Other    • Hypertension Other    • Kidney disease Other    • Heart disease Other    • Diabetes Other    • Diabetes Mother    • Diabetes Maternal Aunt    • Diabetes Maternal Grandmother        Social History     Socioeconomic History   • Marital status:      Spouse name: Not on file   • Number of children: Not on file   • Years of education: Not on file   • Highest education level: Not on file   Tobacco Use   • Smoking status: Current Every Day Smoker     Packs/day: 0.50   • Smokeless tobacco: Never Used   • Tobacco comment: CUTTING BACK AND TRYING TO STOP SMOKING   Substance and Sexual Activity   • Alcohol use: No   • Drug use: No   • Sexual activity: Defer           Objective   Physical Exam  Vitals and nursing note reviewed. Exam conducted with a chaperone present.   Constitutional:       General: She is awake. She is in acute distress.      Appearance: She is toxic-appearing and diaphoretic.   HENT:      Head: Normocephalic and atraumatic.   Eyes:      General: Lids are normal.      Conjunctiva/sclera: Conjunctivae normal.      Pupils: Pupils are equal, round, and reactive to light.   Neck:      Vascular: JVD present.   Cardiovascular:      Rate and Rhythm: Regular rhythm. Tachycardia present.      Chest Wall: PMI is displaced.      Pulses: Normal pulses.      Heart sounds: Murmur heard.   Systolic murmur is present with a grade of 2/6.     Pulmonary:      Effort: Pulmonary effort is normal. Tachypnea present.      Breath sounds: Examination of the right-middle field reveals rales. Examination of the left-middle field reveals rales. Examination of the right-lower field reveals decreased breath sounds and rales. Examination of the left-lower field reveals decreased breath sounds and rales. Decreased breath sounds and rales present.   Abdominal:      General: Abdomen is flat. Bowel sounds  are normal.      Palpations: Abdomen is soft.      Tenderness: There is no abdominal tenderness.   Musculoskeletal:         General: Normal range of motion.      Cervical back: Full passive range of motion without pain, normal range of motion and neck supple.      Right lower le+ Edema present.      Left lower le+ Edema present.   Skin:     General: Skin is warm.      Capillary Refill: Capillary refill takes less than 2 seconds.   Neurological:      General: No focal deficit present.      Mental Status: She is lethargic, disoriented and confused.      GCS: GCS eye subscore is 4. GCS verbal subscore is 4. GCS motor subscore is 5.      Cranial Nerves: Cranial nerves are intact.      Motor: Motor function is intact. No weakness or tremor.      Deep Tendon Reflexes: Reflexes are normal and symmetric.   Psychiatric:         Behavior: Behavior is cooperative.         Central Line At Bedside    Date/Time: 2021 5:52 AM  Performed by: Naun Fields MD  Authorized by: Ken Moon MD     Consent:     Consent obtained:  Emergent situation    Consent given by:  Patient    Risks discussed:  Arterial puncture, bleeding, infection, incorrect placement, nerve damage and pneumothorax    Alternatives discussed:  Delayed treatment  Pre-procedure details:     Hand hygiene: Hand hygiene performed prior to insertion      Sterile barrier technique: All elements of maximal sterile technique followed      Skin preparation:  2% chlorhexidine    Skin preparation agent: Skin preparation agent completely dried prior to procedure    Anesthesia (see MAR for exact dosages):     Anesthesia method:  Local infiltration    Local anesthetic:  Lidocaine 1% w/o epi  Procedure details:     Location:  R internal jugular    Patient position: Sitting.    Procedural supplies:  Triple lumen    Catheter size:  8 Fr    Landmarks identified: yes      Ultrasound guidance: yes      Sterile ultrasound techniques: Sterile gel and sterile  probe covers were used      Number of attempts:  1    Successful placement: yes    Post-procedure details:     Post-procedure:  Dressing applied    Assessment:  Blood return through all ports, free fluid flow, placement verified by x-ray and no pneumothorax on x-ray    Patient tolerance of procedure:  Tolerated well, no immediate complications  Insert Arterial Line    Date/Time: 9/16/2021 5:53 AM  Performed by: Naun Fields MD  Authorized by: Ken Moon MD     Consent:     Consent obtained:  Verbal and emergent situation    Consent given by:  Patient    Risks discussed:  Bleeding, infection, pain, ischemia and repeat procedure  Indications:     Indications: hemodynamic monitoring and multiple ABGs    Pre-procedure details:     Skin preparation:  2% Chlorhexidine    Preparation: Patient was prepped and draped in sterile fashion    Anesthesia (see MAR for exact dosages):     Anesthesia method:  Local infiltration    Local anesthetic:  Lidocaine 1% w/o epi  Procedure details:     Location:  R radial    Aaron's test performed: yes      Aaron's test abnormal: no      Needle gauge:  20 G    Placement technique:  Seldinger    Number of attempts:  2    Transducer: waveform confirmed    Post-procedure details:     Post-procedure:  Biopatch applied, secured with tape and sutured    CMS:  Normal    Patient tolerance of procedure:  Tolerated well, no immediate complications  Critical Care  Performed by: Naun Fields MD  Authorized by: Ken Moon MD     Critical care provider statement:     Critical care time (minutes):  50    Critical care time was exclusive of:  Separately billable procedures and treating other patients    Critical care was necessary to treat or prevent imminent or life-threatening deterioration of the following conditions:  Respiratory failure and renal failure (Hypertensive crisis)    Critical care was time spent personally by me on the following activities:  Blood draw for  specimens, development of treatment plan with patient or surrogate, discussions with primary provider, evaluation of patient's response to treatment, examination of patient, interpretation of cardiac output measurements, vascular access procedures, review of old charts, re-evaluation of patient's condition, pulse oximetry, ordering and review of radiographic studies, ordering and review of laboratory studies and ordering and performing treatments and interventions               ED Course  ED Course as of Sep 16 0554   u Sep 16, 2021   0444 Patient has severe res distress to hypertensive crisis we need to bring her blood pressure down considerably because we know and vasodilation improving the CHF not want to give any diuretics at this time.    [TS]   0445 EKG shows normal sinus rhythm nonspecific to a changes    [TS]   0446 Chest x-ray shows pulmonary edema with cardiomegaly midline trachea    [TS]   0545 The patient given severe shortness of breath and hypertensive crisis art line was placed by me the reason to put art line was to get a better measurement of blood pressure and I want to put on Cardene and nitro glycerin but simultaneously the patient refused a femoral arterial catheterization therefore a radial artery catheterization was performed.  A central line was placed by me in order to measure CVP as well as having access for multiple medications.  Patient was given high-dose nitroglycerin with intent to down titrate insulin up titrating.  And was placed on Cardene drip.  Currently on the BiPAP with these medications blood pressure improving and she is feeling much better.  Her troponin is elevated which is type II elevation she may have early DKA I have not ordered serum ketones on her but her pH 7.38 with anion gap of 18 anion gap of 18 could be just uremia related itself.  Blood pressure is improving insulin will be given as a single dose and the patient is going be admitted to the hospital service to  the ICU.    [TS]      ED Course User Index  [TS] Naun Fields MD                                           MDM  Number of Diagnoses or Management Options  Diagnosis management comments: Differential Diagnosis:  I considered pulmonary etiology, asthma, chronic obstructive pulmonary disease, pneumonia, pulmonary embolism, adult respiratory distress syndrome, pneumothorax, pleural effusion, pulmonary fibrosis, cardiac etiology, congestive heart failure, myocardial infarction, metabolic etiology, diabetic ketoacidosis, uremia, acidosis, sepsis, anemia, drug related etiology, hyperventilation and CNS disease as a possible cause of dyspnea in this patient. This is a partial list of diagnoses considered.            Amount and/or Complexity of Data Reviewed  Clinical lab tests: ordered and reviewed  Tests in the radiology section of CPT®: ordered  Tests in the medicine section of CPT®: reviewed and ordered    Risk of Complications, Morbidity, and/or Mortality  Presenting problems: moderate  Diagnostic procedures: moderate  Management options: moderate        Final diagnoses:   Hypertensive emergency   Acute pulmonary edema (CMS/HCC)   Chronic kidney disease, unspecified CKD stage   Hyperglycemia   Acute respiratory failure with hypoxia (CMS/HCC)       ED Disposition  ED Disposition     ED Disposition Condition Comment    Decision to Admit  Level of Care: Critical Care [6]   Diagnosis: Hypertensive emergency [[id number]]   Admitting Physician: MUNA PHAN [[id number]]   Attending Physician: MUNA PHAN [[id number]]   Certification: I Certify That Inpatient Hospital Services Are Medically Necessary For Greater Than 2 Midnights            No follow-up provider specified.       Medication List      No changes were made to your prescriptions during this visit.          Naun Fields MD  09/16/21 0551       Naun Fields MD  09/16/21 0554

## 2021-09-16 NOTE — ED NOTES
XRAY @ bedside FFOR CENTAL LINE PLACEMENT     Nasima Brenner, RN  09/16/21 0602       Nasima Brenner, RN  09/16/21 0603

## 2021-09-16 NOTE — PLAN OF CARE
Goal Outcome Evaluation:  Plan of Care Reviewed With: patient No injury this shift.  Pt completing dialysis.  Turned and repositioned every 2 hours.  Blood glucose monitoring ACHS with SSI, scheduled insulin, and long acting insulins.  Pt is alert and oriented and will be evaluated per bedside swallow for ability to eat.  Cardene drip infusing at 5.               Reviewed discharge instructions and prescription with patient, verbalized understanding and no further questions at this time  Declined wheelchair, ambulated out of department without difficulties

## 2021-09-16 NOTE — SIGNIFICANT NOTE
patient placed on NC @ 3 lpm for transport to CCU. Bipap settings are 16/8 rate 15 80% currently - changes made to bipap settings based on ABG result from 0435 AM. Decreased settings to 12/6 rate 10 40% - placed BP on stby @ bedside - tolerating NC well. Report to Selene COOK

## 2021-09-16 NOTE — PROGRESS NOTES
[facility]  INPATIENT PROGRESS NOTE    Length of Stay: 0  Date of Admission: 9/16/2021  Primary Care Physician: Yaron Wiggins APRN    Subjective   Chief Complaint: Hypertensive crisis/dialysis patient/fluid overload/noncompliance/uncontrolled diabetes    HPI   Blood pressures improving slowly.  Patient is arousable very back to sleep.  Patient no acute distress.  Currently on 3 L of oxygen.    Review of Systems   Unable obtain due to somnolence.  Otherwise as stated above.    All pertinent negatives and positives are as above. All other systems have been reviewed and are negative unless otherwise stated.     Objective    Temp:  [98.6 °F (37 °C)] 98.6 °F (37 °C)  Heart Rate:  [79-98] 79  Resp:  [15-28] 20  BP: (131-247)/() 131/64  Arterial Line BP: (179-212)/(65-78) 180/65  No intake or output data in the 24 hours ending 09/16/21 0801  Physical Exam  Vitals and nursing note reviewed.   Constitutional:       Appearance: She is well-developed.      Comments: Chronically ill.   HENT:      Head: Normocephalic.   Eyes:      Conjunctiva/sclera: Conjunctivae normal.      Pupils: Pupils are equal, round, and reactive to light.   Neck:      Vascular: No JVD.   Cardiovascular:      Rate and Rhythm: Normal rate and regular rhythm.      Heart sounds: Normal heart sounds. No murmur heard.   No friction rub. No gallop.    Pulmonary:      Effort: No respiratory distress.      Breath sounds: No wheezing or rales.      Comments: On 3 L of oxygen, diminished breath sound bilateral.  Clear.  Chest:      Chest wall: No tenderness.   Abdominal:      General: Bowel sounds are normal. There is no distension.      Palpations: Abdomen is soft.      Tenderness: There is no abdominal tenderness. There is no guarding or rebound.   Musculoskeletal:         General: No tenderness or deformity. Normal range of motion.      Cervical back: Neck supple.   Skin:     General: Skin is  warm and dry.      Capillary Refill: Capillary refill takes 2 to 3 seconds.      Findings: No rash.   Neurological:      Cranial Nerves: No cranial nerve deficit.      Motor: Weakness present. No abnormal muscle tone.      Coordination: Coordination abnormal.      Gait: Gait abnormal.      Deep Tendon Reflexes: Reflexes normal.         Results Review:  Lab Results (last 24 hours)     Procedure Component Value Units Date/Time    [place] Draw [[id number]] Collected: 09/16/21 0435    Specimen: Blood Updated: 09/16/21 0545    Narrative:      The following orders were created for panel order [place] Draw.  Procedure                               Abnormality         Status                     ---------                               -----------         ------                     Green Top (Gel)[[id number]]                                  Final result               Lavender Top[[id number]]                                     Final result               Red Top[[id number]]                                          Final result               [facility] Blood Culture Haider...[[id number]]                      Final result               Gray Top[[id number]]                                         In process                 Light Blue Top[[id number]]                                   Final result                 Please view results for these tests on the individual orders.    [facility] Blood Culture Bottle Set [[id number]] Collected: 09/16/21 0435    Specimen: Blood from Arm, Right Updated: 09/16/21 0545     Extra Tube Hold for add-ons.     Comment: Auto resulted.       Green Top (Gel) [[id number]] Collected: 09/16/21 0435    Specimen: Blood Updated: 09/16/21 0545     Extra Tube Hold for add-ons.     Comment: Auto resulted.       Red Top [[id number]] Collected: 09/16/21 0435    Specimen: Blood Updated: 09/16/21 0545     Extra Tube Hold for add-ons.     Comment: Auto resulted.       Light Blue Top [[id number]] Collected: 09/16/21 0435    Specimen:  Blood Updated: 09/16/21 0545     Extra Tube hold for add-on     Comment: Auto resulted       Lavender Top [[id number]] Collected: 09/16/21 0435    Specimen: Blood Updated: 09/16/21 0545     Extra Tube hold for add-on     Comment: Auto resulted       COVID PRE-OP / PRE-PROCEDURE SCREENING ORDER (NO ISOLATION) - Swab, Nasal Cavity [[id number]]  (Normal) Collected: 09/16/21 0446    Specimen: Swab from Nasal Cavity Updated: 09/16/21 0535    Narrative:      The following orders were created for panel order COVID PRE-OP / PRE-PROCEDURE SCREENING ORDER (NO ISOLATION) - Swab, Nasal Cavity.  Procedure                               Abnormality         Status                     ---------                               -----------         ------                     COVID-19,[facility] IN-RONNIE...[[id number]]  Normal              Final result                 Please view results for these tests on the individual orders.    COVID-19,[facility] IN-HOUSE,Nasal Swab No Transport Media 3-4 HR TAT - Swab, Nasal Cavity [[id number]]  (Normal) Collected: 09/16/21 0446    Specimen: Swab from Nasal Cavity Updated: 09/16/21 0535     COVID19 Not Detected    Narrative:      Fact sheet for providers: https://www.fda.gov/media/985396/download     Fact sheet for patients: https://www.fda.gov/media/879036/download    Test performed by PCR.    Consider negative results in combination with clinical observations, patient history, and epidemiological information.    BNP [[id number]]  (Abnormal) Collected: 09/16/21 0435    Specimen: Blood Updated: 09/16/21 0523     proBNP 54,505.0 pg/mL     Narrative:      Among patients with dyspnea, NT-proBNP is highly sensitive for the detection of acute congestive heart failure. In addition NT-proBNP of <300 pg/ml effectively rules out acute congestive heart failure with 99% negative predictive value.    Results may be falsely decreased if patient taking Biotin.      Troponin [[id number]]  (Abnormal) Collected: 09/16/21 0435     Specimen: Blood Updated: 09/16/21 0521     Troponin T 0.188 ng/mL     Narrative:      Troponin T Reference Range:  <= 0.03 ng/mL-   Negative for AMI  >0.03 ng/mL-     Abnormal for myocardial necrosis.  Clinicians would have to utilize clinical acumen, EKG, Troponin and serial changes to determine if it is an Acute Myocardial Infarction or myocardial injury due to an underlying chronic condition.       Results may be falsely decreased if patient taking Biotin.      Comprehensive Metabolic Panel [[id number]]  (Abnormal) Collected: 09/16/21 0435    Specimen: Blood Updated: 09/16/21 0521     Glucose 566 mg/dL      BUN 41 mg/dL      Creatinine 4.21 mg/dL      Sodium 131 mmol/L      Potassium 5.1 mmol/L      Chloride 89 mmol/L      CO2 24.0 mmol/L      Calcium 8.8 mg/dL      Total Protein 7.8 g/dL      Albumin 4.10 g/dL      ALT (SGPT) 40 U/L      AST (SGOT) 81 U/L      Alkaline Phosphatase 205 U/L      Total Bilirubin 0.6 mg/dL      eGFR Non African Amer 11 mL/min/1.73      Comment: <15 Indicative of kidney failure.        eGFR   Amer --     Comment: <15 Indicative of kidney failure.        Globulin 3.7 gm/dL      A/G Ratio 1.1 g/dL      BUN/Creatinine Ratio 9.7     Anion Gap 18.0 mmol/L     Narrative:      GFR Normal >60  Chronic Kidney Disease <60  Kidney Failure <15      Magnesium [[id number]]  (Abnormal) Collected: 09/16/21 0435    Specimen: Blood Updated: 09/16/21 0501     Magnesium 2.7 mg/dL     aPTT [[id number]]  (Normal) Collected: 09/16/21 0435    Specimen: Blood Updated: 09/16/21 0452     PTT 33.1 seconds     Protime-INR [[id number]]  (Abnormal) Collected: 09/16/21 0435    Specimen: Blood Updated: 09/16/21 0452     Protime 15.4 Seconds      INR 1.27    CBC & Differential [[id number]]  (Abnormal) Collected: 09/16/21 0435    Specimen: Blood Updated: 09/16/21 0444    Narrative:      The following orders were created for panel order CBC & Differential.  Procedure                               Abnormality          Status                     ---------                               -----------         ------                     CBC Auto Differential[[id number]]        Abnormal            Final result                 Please view results for these tests on the individual orders.    CBC Auto Differential [[id number]]  (Abnormal) Collected: 09/16/21 0435    Specimen: Blood Updated: 09/16/21 0444     WBC 7.78 10*3/mm3      RBC 2.86 10*6/mm3      Hemoglobin 8.7 g/dL      Hematocrit 26.9 %      MCV 94.1 fL      MCH 30.4 pg      MCHC 32.3 g/dL      RDW 15.6 %      RDW-SD 54.0 fl      MPV 9.5 fL      Platelets 191 10*3/mm3      Neutrophil % 70.8 %      Lymphocyte % 20.1 %      Monocyte % 6.4 %      Eosinophil % 1.3 %      Basophil % 1.0 %      Immature Grans % 0.4 %      Neutrophils, Absolute 5.51 10*3/mm3      Lymphocytes, Absolute 1.56 10*3/mm3      Monocytes, Absolute 0.50 10*3/mm3      Eosinophils, Absolute 0.10 10*3/mm3      Basophils, Absolute 0.08 10*3/mm3      Immature Grans, Absolute 0.03 10*3/mm3      nRBC 0.0 /100 WBC     Blood Gas, Arterial With Co-Ox [[id number]]  (Abnormal) Collected: 09/16/21 0435    Specimen: Arterial Blood Updated: 09/16/21 0443     Site Left Radial     Aaron's Test N/A     pH, Arterial 7.385 pH units      pCO2, Arterial 45.1 mm Hg      Comment: 83 Value above reference range        pO2, Arterial 113.0 mm Hg      Comment: 83 Value above reference range        HCO3, Arterial 27.0 mmol/L      Comment: 83 Value above reference range        Base Excess, Arterial 1.7 mmol/L      O2 Saturation, Arterial 99.1 %      Comment: 83 Value above reference range        Hemoglobin, Blood Gas 8.7 g/dL      Comment: 84 Value below reference range        Hematocrit, Blood Gas 26.7 %      Comment: 84 Value below reference range        Oxyhemoglobin 94.2 %      Methemoglobin 1.00 %      Carboxyhemoglobin 4.0 %      A-a Gradiant 544.6 mmHg      Temperature 37.0 C      Sodium, Arterial 134 mmol/L      Comment: 84 Value below  reference range        Potassium, Arterial 4.9 mmol/L      Barometric Pressure for Blood Gas 750 mmHg      Modality BiPap     FIO2 100 %      Ventilator Mode NA     IPAP 16     Comment: Meter: [id number]     :  [id number]        EPAP 8     Note --     Collected by [id number]     pH, Temp Corrected 7.385 pH Units      pCO2, Temperature Corrected 45.1 mm Hg      pO2, Temperature Corrected 113 mm Hg     Brun[place] Top [[id number]] Collected: 09/16/21 0435    Specimen: Blood Updated: 09/16/21 0440           Cultures:  No results found for: BLOODCX, URINECX, WOUNDCX, MRSACX, RESPCX, STOOLCX    Radiology Data:    Imaging Results (Last 24 Hours)     Procedure Component Value Units Date/Time    XR Chest 1 View [[id number]] Collected: 09/16/21 0742     Updated: 09/16/21 0747    Narrative:      Frontal supine radiograph of the chest 9/16/2021 4:42 AM CDT     History: soa     Comparison: Chest x-ray dated 9/11/2021.      Findings:   Right IJ catheter has been removed. Subclavian stent on the left and  that along the left humerus.. Mild to moderate cardiomegaly.  Interstitial edema.. No measurable pneumothorax. Trace pleural effusions  suspected..       No acute osseous or soft tissue abnormality is noted.        Impression:      Impression:   1.   Findings of fluid overload..        This report was finalized on 09/16/2021 07:44 by Dr. Anabela Washington MD.    XR Chest 1 View [[id number]] Collected: 09/16/21 0738     Updated: 09/16/21 0744    Narrative:      Frontal supine radiograph of the chest 9/16/2021 5:53 AM CDT     History: CENTRAL LINE PLACEMENT; I16.1-Hypertensive emergency;  J81.0-Acute pulmonary edema; N18.9-Chronic kidney disease, unspecified;  R73.9-Hyperglycemia, unspecified; J96.01-Acute respiratory failure with  hypoxia     Comparison: Chest x-ray dated 9/16/2021      Findings:   There is a new right IJ catheters which terminates in the right atrium.  Left subclavian stent is redemonstrated. Partial visualization  of the  vascular stent along the left humerus.. Bilateral central parenchymal  opacities in the left lower lung base opacity are grossly similar..  Moderate cardiomegaly..       No acute osseous or soft tissue abnormality is noted.        Impression:      Impression:   1.   Findings remain concerning for fluid overload.  2.  New right IJ catheter, without measurable pneumothorax.        This report was finalized on 09/16/2021 07:41 by Dr. Anabela Washington MD.          Allergies   Allergen Reactions   • Penicillins Hives and Shortness Of Breath   • Lamisil [Terbinafine]    • Reglan [Metoclopramide] GI Intolerance   • Stadol [Butorphanol] Nausea And Vomiting       Scheduled meds:        PRN meds:  sodium chloride    Assessment/Plan       Hypertensive emergency    Intractable nausea and vomiting    Gastroparesis due to DM (CMS/HCC)    Seizure (CMS/HCC)    End stage kidney disease (CMS/HCC)    Uncontrolled diabetes mellitus (CMS/HCC)    Acute pulmonary edema (CMS/HCC)    Hypothyroidism      Plan:  Hypertensive emergency/hyperlipidemia.  Cardene drip.  Nitro drip.  Continue Procardia, lisinopril, hydralazine, clonidine, Coreg.  Lipitor.    Nausea/vomiting/gastroparesis.  Pepcid IV.  Zofran as needed.    End-stage renal disease on dialysis.  History of missing dialysis constantly.    Pulmonary edema/Shortness of breath.  Patient is requiring BiPAP.  DuoNebs.  Chest x-ray-Findings remain concerning for fluid overload, new right IJ catheter, without measurable pneumothorax.  Patient is currently on 3 L of oxygen.  BiPAP as needed.    Uncontrolled diabetes.  High sliding scale.  Start back on Levemir.  5 units of lispro with every meal.    Depression.  Cymbalta.    Reflux.  Pepcid.      Restless leg.  Requip at night.    Constipation.  Lactulose.    Hypothyroidism.  Synthroid.    History of seizure.    Covid-19-negative.  Blood culture pending.    Nutrition.  N.p.o. for now.    Discharge Planning: History of noncompliance.  2  to 4 days.    Electronically signed by Narinder Madrid MD, 09/16/21, 8:01 AM CDT.

## 2021-09-17 NOTE — PAYOR COMM NOTE
"  9/17/21.  [facility] [phone]  [phone]    PATIENT ER ADMISSION TO INPATIENT TO CCU ON 9/16/21.    INPATIENT REQUEST FOR REVIEW.          WonChemo (51 y.o. Female)     Date of Birth Social Security Number Address Home Phone MRN    1970  [address] [facility] [address] [phone] [id number]    Congregation Marital Status          Other        Admission Date Admission Type Admitting Provider Attending Provider Department, Room/Bed    9/16/21 Emergency Narinder Madrid MD Truong, Khai C, MD [facility] CARDIAC CARE, C002/1    Discharge Date Discharge Disposition Discharge Destination                       Attending Provider: Narinder Madrid MD    Allergies: Penicillins, Lamisil [Terbinafine], Reglan [Metoclopramide], Stadol [Butorphanol]    Isolation: None   Infection: None   Code Status: CPR    Ht: 160 cm (63\")   Wt: 74.5 kg (164 lb 3.9 oz)    Admission Cmt: None   Principal Problem: Hypertensive emergency [I16.1]                 Active Insurance as of 9/16/2021     Primary Coverage     Payor Plan Insurance Group Employer/Plan Group    [facility] MEDICARE REPLACEMENT [employer] MEDICARE REPLACEMENT      Payor Plan Address Payor Plan Phone Number Payor Plan Fax Number Effective Dates    [address] [address] [phone]  6/1/2021 - None Entered    [facility] [address]       Subscriber Name Subscriber Birth Date Member ID       CHEMO BIRD 1970 [id number]           Secondary Coverage     Payor Plan Insurance Group Employer/Plan Group    [place]      Payor Plan Address Payor Plan Phone Number Payor Plan Fax Number Effective Dates    [address] [phone]  8/17/2021 - None Entered    [facility] [id number]       Subscriber Name Subscriber Birth Date Member ID       CHEMO BIRD 1970 [id number]                 Emergency Contacts      (Rel.) Home Phone Work Phone Mobile Phone    Kassandra Paez (Sister) " [phone] -- [phone]    Dmitriy Galvez (Friend) -- -- [phone]        [facility] Encounter Date/Time: 2021 043   Hospital Account: [id number]    MRN: [id number]   Patient:  Chemo Upton   Contact Serial #: [id number]   SSN:          ENCOUNTER             Patient Class: Inpatient   Unit: [facility]   Hospital Service: Medicine     Bed: C002/1   Admitting Provider: Narinedr Madrid MD   Referring Physician: Ken Moon   Attending Provider: Narinder Madrid MD   Adm Diagnosis: Hypertensive emergency [*               PATIENT          Name: Chemo Upton : 1970 (51 yrs)   Address: [address] [place] [address] Sex: Female   City: [place] [address]   [place]: [facility]   Marital Status:  Ethnicity: NOT                                                                              Race: WHITE   Primary Care Provider: Yaron Wiggins AP* Patients Phone: Home Phone: [phone]     Mobile Phone: [phone]   EMERGENCY CONTACT   Contact Name Legal Guardian? Relationship to Patient Home Phone Work Phone   1. Kassandra Paez  2. Dmitriy Galvez    No Sister  Friend [phone]              GUARANTOR            Guarantor: Chemo Upton     : 1970   Address: [address] [place] [address] Sex: Female     [place] [address]     Relation to Patient: Self       Home Phone: [phone]   Guarantor ID: [id number]       Work Phone:     GUARANTOR EMPLOYER   Employer:           Status: DISABLED   COVERAGE          PRIMARY INSURANCE   Payor: [facility] MEDICARE REPLACEMENT Plan: [employer] MEDICARE REPLACEMENT   Group Number:   Insurance Type: INDEMNITY   Subscriber Name: CHEMO UPTON Subscriber : 1970   Subscriber ID: [id number] Coverage Address: [address] [address]   Pat. Rel. to Subscriber: Self Coverage Phone: [phone]   SECONDARY INSURANCE   [place] Plan[place]   Group Number:   Insurance Type: INDEMNITY   Subscriber Name:  CHEMO BIRD Subscriber : 1970   Subscriber ID: [id number] Coverage Address: [address] [place] [address]   Pat. Rel. to Subscriber: SELF Coverage Phone: [phone]      Contact Serial # ([id number])  `       2021    Chart ID ([id number])

## 2021-09-17 NOTE — PLAN OF CARE
Goal Outcome Evaluation:  Plan of Care Reviewed With: patient        Progress: improving  Outcome Summary: Patient moved from CCU this afternoon. No c/o pain or SOA. BP stable. Continue to monitor.

## 2021-09-17 NOTE — PROGRESS NOTES
[facility] Medicine Services  INPATIENT PROGRESS NOTE    Length of Stay: 1  Date of Admission: 9/16/2021  Primary Care Physician: Yaron Wiggins APRN    Subjective   Chief Complaint: Follow-up shortness of breath  HPI   Patient resting in bed eating breakfast.  She states she is feeling much better in comparison to admission.  She feels her shortness of breath is much improved.  She denies any chest pain.  She states she has a chronic cough, which is no worse than her baseline.  She typically does not wear oxygen at home.  She removes oxygen during my evaluation and her oxygen saturation remains at 98%.  She denies abdominal pain, nausea, or vomiting.  She states she is compliant with dialysis treatments, although may have missed her treatment on 9/14.  She also states she is compliant with her medications.    Review of Systems   All pertinent negatives and positives are as above. All other systems have been reviewed and are negative unless otherwise stated.     Objective    Temp:  [98.2 °F (36.8 °C)-99.2 °F (37.3 °C)] 98.5 °F (36.9 °C)  Heart Rate:  [69-82] 69  Resp:  [8-19] 8  BP: (124-168)/(56-80) 138/62  Arterial Line BP: (132-172)/(53-66) 144/57  Physical Exam  Vitals and nursing note reviewed.   Constitutional:       General: She is not in acute distress.     Appearance: She is ill-appearing (chronically).   HENT:      Head: Normocephalic and atraumatic.   Cardiovascular:      Rate and Rhythm: Normal rate and regular rhythm.      Heart sounds: Murmur heard.        Comments: Right internal jugular central line and right arterial line in place.  Pulmonary:      Effort: Pulmonary effort is normal.      Breath sounds: Rales (bibasilar) present. No wheezing or rhonchi.   Abdominal:      General: Bowel sounds are normal. There is no distension.      Palpations: Abdomen is soft.      Tenderness: There is no abdominal tenderness.   Musculoskeletal:         General: No  swelling or tenderness. Normal range of motion.      Cervical back: Normal range of motion and neck supple. No tenderness.   Skin:     General: Skin is warm and dry.      Coloration: Skin is pale.   Neurological:      General: No focal deficit present.      Mental Status: She is alert and oriented to person, place, and time.   Psychiatric:         Mood and Affect: Mood normal.         Behavior: Behavior normal.         Thought Content: Thought content normal.         Judgment: Judgment normal.       Results Review:  I have reviewed the labs, radiology results, and diagnostic studies.    Laboratory Data:   Results from last 7 days   Lab Units 09/17/21  0254 09/16/21  1008 09/16/21  0435   WBC 10*3/mm3 5.10 7.53 7.78   HEMOGLOBIN g/dL 7.0* 7.3* 8.7*   HEMATOCRIT % 23.1* 23.8* 26.9*   PLATELETS 10*3/mm3 160 162 191     Results from last 7 days   Lab Units 09/17/21  0254 09/16/21  1008 09/16/21  0435 09/12/21  0904 09/11/21  0511   SODIUM mmol/L 138 131* 131*   < > 138   SODIUM, ARTERIAL mmol/L  --   --  134*  --   --    POTASSIUM mmol/L 4.4 5.2 5.1   < > 2.9*   CHLORIDE mmol/L 98 90* 89*   < > 96*   CO2 mmol/L 31.0* 26.0 24.0   < > 27.0   BUN mg/dL 27* 45* 41*   < > 37*   CREATININE mg/dL 3.43* 4.52* 4.21*   < > 3.91*   CALCIUM mg/dL 8.6 8.5* 8.8   < > 8.5*   BILIRUBIN mg/dL 0.3  --  0.6  --  0.2   ALK PHOS U/L 144*  --  205*  --  108   ALT (SGPT) U/L 25  --  40*  --  7   AST (SGOT) U/L 27  --  81*  --  14   GLUCOSE mg/dL 132* 550* 566*   < > 122*    < > = values in this interval not displayed.     I have reviewed the patient current medications.     Assessment/Plan     Active Hospital Problems    Diagnosis    • **Hypertensive emergency    • Acute pulmonary edema (CMS/HCC)    • Hypothyroidism    • Uncontrolled diabetes mellitus (CMS/HCC)    • End stage kidney disease (CMS/HCC)    • Seizure (CMS/HCC)    • Gastroparesis due to DM (CMS/HCC)    • Intractable nausea and vomiting      Plan:  1.  Patient presented to the  emergency department 9/16/2021 with complaints of shortness of breath.  Oxygen saturation was 85% she was placed on BiPAP.  She was recently admitted to our facility from 9/10 through 9/13 found with decreased responsiveness at home.  She was found to be hypoglycemic at that time.  2.  Elevated troponin and BNP on admission felt to be secondary to chronic renal failure and fluid overload.  EKG shows no acute ST-T wave changes.  Chest x-ray showed concerns for fluid overload.  The patient does have hemodialysis on Tuesday, Thursday, Saturdays.  She was felt to have missed her treatment on 9/14.  Nephrology following, and she did receive dialysis yesterday with improvement of her symptoms.  She is now on room air.  3.  Hypertensive on admission with blood pressure of 239/125.  She was on a nitroglycerin drip for short time in the emergency department and then placed on Cardene drip.  She remains on Cardene drip this morning with improvement of blood pressures.  Discussed with nursing, will try to wean from Cardene drip today.  Continue home medications Coreg, clonidine, hydralazine, Lisinopril, and Procardia.  Titrate oral medications as necessary, hydralazine dosage increased per nephrology today.  If able to wean from Cardene drip, will remove central line and arterial line and transfer to telemetry floor.  4.  Anemia of chronic disease noted.  Hemoglobin 7.0 this morning.  No signs of active bleeding.  Retacrit on dialysis days per nephrology.  5.  TSH under suppressed with low free T4.  Patient indicates she is compliant with her medication.  Will increase Synthroid from 25 mcg to 50 mcg.  This will need to be rechecked on an outpatient basis in the next 6 weeks.  6.  Blood glucoses greater than 500 on admission.  Last blood glucoses-163, 166, 152.  Resume home prandial insulin 10 units with meals, hold sliding scale for now.  Levemir resumed at a lower dosage.  Hemoglobin A1c 11.4%.  Consult diabetes educator and  dietitian.  7.  Labs in a.m.    Discharge Planning: I expect the patient to be discharged to home in 1-2 days.    Electronically signed by GAEL Pizarro, 9/17/2021, 10:40 CDT.

## 2021-09-17 NOTE — CASE MANAGEMENT/SOCIAL WORK
Discharge Planning Assessment  [facility]     Patient Name: Ena Upton  MRN: [id number]  Today's Date: 9/17/2021    Admit Date: 9/16/2021    Discharge Needs Assessment     Row Name 09/17/21 0949       Living Environment    Lives With  alone    Current Living Arrangements  home/apartment/condo    Primary Care Provided by  self    Provides Primary Care For  no one    Family Caregiver if Needed  none    Quality of Family Relationships  unable to assess    Able to Return to Prior Arrangements  yes       Resource/Environmental Concerns    Resource/Environmental Concerns  none       Transition Planning    Patient/Family Anticipates Transition to  home    Patient/Family Anticipated Services at Transition  none    Transportation Anticipated  family or friend will provide       Discharge Needs Assessment    Readmission Within the Last 30 Days  no previous admission in last 30 days    Current Outpatient/Agency/Support Group  outpatient hemodialysis    Equipment Currently Used at Home  cane, straight;glucometer;rollator;wheelchair    Concerns to be Addressed  no discharge needs identified;denies needs/concerns at this time    Anticipated Changes Related to Illness  none    Equipment Needed After Discharge  none    Outpatient/Agency/Support Group Needs  outpatient hemodialysis    Current Discharge Risk  chronically ill;lives alone    Discharge Coordination/Progress  SW spoke with patient regarding dishcharge plan/needs.  Patient states she resides alone, and functions independently.  Patient has a PCP and rX coverage.  Patient attends dialysis at the [facility], T,T,S and transports to and from dialysis via PATS.  Patient plans to return home upon discharge and denies any discharge needs.        Discharge Plan    No documentation.       Continued Care and Services - Admitted Since 9/16/2021    Coordination has not been started for this encounter.     Selected Continued Care - Prior Encounters Includes selections  from prior encounters from 6/18/2021 to 9/17/2021    Discharged on 8/20/2021 Admission date: 8/17/2021 - Discharge disposition: Home or Self Care    Durable Medical Equipment     Service Provider Selected Services Address Phone Fax Patient Preferred    [facility]  Durable Medical Equipment [address] [facility] [address] [phone] --                Discharged on 6/29/2021 Admission date: 6/19/2021 - Discharge disposition: Skilled Nursing Facility (DC - External)    Destination     Service Provider Selected Services Address Phone Fax Patient Preferred    [facility]  Skilled Nursing [address] [facility] [address] [phone] --                      Demographic Summary    No documentation.       Functional Status    No documentation.       Psychosocial    No documentation.       Abuse/Neglect    No documentation.       Legal    No documentation.       Substance Abuse    No documentation.       Patient Forms    No documentation.           DUSTIN CampbellW

## 2021-09-17 NOTE — PLAN OF CARE
Goal Outcome Evaluation:           Progress: improving  Outcome Summary: Pt. resting quietly with no c/o pain during shift. No UOP during shift. Pt. on 2L NC with O2 sats in the mid 90s. Cardene is currently off, BPs in mid 160s. Cardene has been on and off all night due to pt.'s BP spiking after cardene has been off for some time. Pt. remains NSR and afebrile. Bedtime BS was 99, levemir held. Hgb on morning labs was 7.0, MD notified and is aware. No new orders at this time. Continue to monitor.

## 2021-09-17 NOTE — PROGRESS NOTES
Nephrology ([facility]) Progress Note      Patient:  Ena Upton  YOB: 1970  Date of Service: 9/17/2021  MRN: [id number]   Acct: [id number]   Primary Care Physician: Yaron Wiggins APRN  Advance Directive:   Code Status and Medical Interventions:   Ordered at: 09/16/21 0716     Code Status:    CPR     Medical Interventions (Level of Support Prior to Arrest):    Full     Admit Date: 9/16/2021       Hospital Day: 1  Referring Provider: Ken Moon,*      Patient personally seen and examined.  Complete chart including Consults, Notes, Operative Reports, Labs, Cardiology, and Radiology studies reviewed as able.        Subjective:  Ena Upton is a 51 y.o. female  whom we were consulted for end stage renal disease. Maintenance hemodialysis Tuesday Thursday Saturday. Just discharged from hospital three days ago on 9/13 after being treated for hypoglycemia. Patient apparently missed HD on 9/14. Presented to ER this time with dyspnea, elevated blood pressure, initially very lethargic. Placed on NTG and Cardene drips for BP control. Currently is in CCU.recieved dialysis on 9/16, mentation improved after treatment    Today is awake/alert. No new complaints.  Cardene gtt has been off intermittently through the night    Allergies:  Penicillins, Lamisil [terbinafine], Reglan [metoclopramide], and Stadol [butorphanol]    Home Meds:  Medications Prior to Admission   Medication Sig Dispense Refill Last Dose   • carvedilol (COREG) 12.5 MG tablet Take 1 tablet by mouth 2 (Two) Times a Day With Meals.   9/16/2021 at Unknown time   • cloNIDine (CATAPRES) 0.2 MG tablet Take 1 tablet by mouth Every 8 (Eight) Hours. (Patient taking differently: Take 0.3 mg by mouth Every 8 (Eight) Hours.)   9/15/2021 at Unknown time   • DULoxetine (CYMBALTA) 60 MG capsule Take 60 mg by mouth daily.   9/15/2021 at Unknown time   • hydrALAZINE (APRESOLINE) 50 MG tablet Take 50 mg by mouth 3  (Three) Times a Day.   9/15/2021 at Unknown time   • insulin aspart (novoLOG) 100 UNIT/ML injection Inject 10 Units under the skin into the appropriate area as directed 3 (Three) Times a Day Before Meals.   9/15/2021 at Unknown time   • Insulin Glargine (BASAGLAR KWIKPEN) 100 UNIT/ML injection pen Inject 40 Units under the skin into the appropriate area as directed Every Night.   9/15/2021 at Unknown time   • lactulose (CHRONULAC) 10 GM/15ML solution Take 20 g by mouth 3 (Three) Times a Day.   9/15/2021 at Unknown time   • levothyroxine (SYNTHROID, LEVOTHROID) 25 MCG tablet Take 25 mcg by mouth Daily.   9/15/2021 at Unknown time   • lisinopril (PRINIVIL,ZESTRIL) 20 MG tablet Take 20 mg by mouth 2 (two) times a day.   9/15/2021 at Unknown time   • NIFEdipine XL (PROCARDIA XL) 60 MG 24 hr tablet Take 60 mg by mouth 2 (two) times a day.   9/15/2021 at Unknown time   • ondansetron (ZOFRAN) 4 MG tablet Take 4 mg by mouth Every 8 (Eight) Hours As Needed for nausea or vomiting.   Past Month at Unknown time   • pregabalin (LYRICA) 75 MG capsule Take 75 mg by mouth 2 (Two) Times a Day.   9/15/2021 at Unknown time   • rOPINIRole (REQUIP) 4 MG tablet Take 4 mg by mouth Every Night.   9/15/2021 at Unknown time   • simvastatin (ZOCOR) 20 MG tablet Take 40 mg by mouth Daily.   9/15/2021 at Unknown time   • albuterol (PROVENTIL HFA;VENTOLIN HFA) 108 (90 BASE) MCG/ACT inhaler Inhale 2 puffs Every 4 (Four) Hours As Needed for wheezing.   More than a month at Unknown time       Medicines:  Current Facility-Administered Medications   Medication Dose Route Frequency Provider Last Rate Last Admin   • atorvastatin (LIPITOR) tablet 10 mg  10 mg Oral Daily Ken Moon MD       • carvedilol (COREG) tablet 12.5 mg  12.5 mg Oral BID With Meals Ken Moon MD   12.5 mg at 09/16/21 1758   • cloNIDine (CATAPRES) tablet 0.3 mg  0.3 mg Oral Q8H Ken Moon MD   0.3 mg at 09/17/21 0601   • dextrose (D50W) 25  g/ 50mL Intravenous Solution 25 g  25 g Intravenous Q15 Min PRN Ken Moon MD       • dextrose (GLUTOSE) oral gel 15 g  15 g Oral Q15 Min PRN Ken Moon MD       • DULoxetine (CYMBALTA) DR capsule 60 mg  60 mg Oral Daily Ken Moon MD       • epoetin yolanda-epbx (RETACRIT) injection 10,000 Units  10,000 Units Subcutaneous Once per day on Tue Thu Sat Brandon Ann MD   10,000 Units at 09/16/21 1758   • famotidine (PEPCID) injection 20 mg  20 mg Intravenous Daily Narinder Madrid MD       • glucagon (human recombinant) (GLUCAGEN DIAGNOSTIC) injection 1 mg  1 mg Subcutaneous Q15 Min PRN Ken Moon MD       • hydrALAZINE (APRESOLINE) tablet 50 mg  50 mg Oral TID Ken Moon MD   50 mg at 09/16/21 2026   • insulin detemir (LEVEMIR) injection 15 Units  15 Units Subcutaneous Q12H Narinder Madrid MD   15 Units at 09/16/21 1134   • insulin lispro (humaLOG) injection 0-24 Units  0-24 Units Subcutaneous TID AC Ken Moon MD   4 Units at 09/17/21 0632   • insulin lispro (humaLOG) injection 5 Units  5 Units Subcutaneous TID With Meals Ken Moon MD   5 Units at 09/17/21 0739   • ipratropium-albuterol (DUO-NEB) nebulizer solution 3 mL  3 mL Nebulization Q6H - RT Ken Moon MD   3 mL at 09/17/21 0611   • ipratropium-albuterol (DUO-NEB) nebulizer solution 3 mL  3 mL Nebulization Q6H PRN Ken Moon MD       • lactulose (CHRONULAC) 10 GM/15ML solution 20 g  20 g Oral BID Ken Moon MD   20 g at 09/16/21 2026   • [START ON 9/18/2021] levothyroxine (SYNTHROID, LEVOTHROID) tablet 50 mcg  50 mcg Oral Q AM Alejandra Kruse APRN       • lisinopril (PRINIVIL,ZESTRIL) tablet 20 mg  20 mg Oral Q24H Ken Moon MD       • niCARdipine (CARDENE) 25 mg in 250 mL NS infusion  5-15 mg/hr Intravenous Titrated Ken Moon MD 50 mL/hr at 09/17/21 0529 5 mg/hr at 09/17/21 0529   •  NIFEdipine XL (PROCARDIA XL) 24 hr tablet 60 mg  60 mg Oral Q24H Ken Moon MD       • nitroglycerin (TRIDIL) 200 mcg/ml infusion  100 mcg/min Intravenous Titrated Ken Moon MD   Stopped at 09/16/21 1358   • ondansetron (ZOFRAN) injection 4 mg  4 mg Intravenous Q6H PRN Narinder Madrid MD       • pregabalin (LYRICA) capsule 75 mg  75 mg Oral Daily Ken Moon MD       • rOPINIRole (REQUIP) tablet 4 mg  4 mg Oral Nightly Ken Moon MD   4 mg at 09/16/21 2026   • sodium chloride 0.9 % flush 10 mL  10 mL Intravenous PRN Ken Moon MD       • sodium chloride 0.9 % flush 10 mL  10 mL Intravenous Q12H Ken Moon MD   10 mL at 09/16/21 2026   • sodium chloride 0.9 % flush 10 mL  10 mL Intravenous PRN Ken Moon MD           Past Medical History:  Past Medical History:   Diagnosis Date   • Anxiety    • Arthritis    • Chronic kidney disease     STAGE V RENAL FAILURE   • Depression    • Diabetes mellitus (CMS/HCC)    • HTN (hypertension)    • Hypothyroidism    • Obesity    • Tremors of nervous system        Past Surgical History:  Past Surgical History:   Procedure Laterality Date   • ARTERIOVENOUS FISTULA     • BREAST BIOPSY     • CARPAL TUNNEL RELEASE     • CATH LAB PROCEDURE     • CHOLECYSTECTOMY     • TUBAL ABDOMINAL LIGATION         Family History  Family History   Problem Relation Age of Onset   • Cancer Other    • Hypertension Other    • Kidney disease Other    • Heart disease Other    • Diabetes Other    • Diabetes Mother    • Diabetes Maternal Aunt    • Diabetes Maternal Grandmother        Social History  Social History     Socioeconomic History   • Marital status:      Spouse name: Not on file   • Number of children: Not on file   • Years of education: Not on file   • Highest education level: Not on file   Tobacco Use   • Smoking status: Current Every Day Smoker     Packs/day: 0.50   • Smokeless tobacco: Never  Used   • Tobacco comment: CUTTING BACK AND TRYING TO STOP SMOKING   Substance and Sexual Activity   • Alcohol use: No   • Drug use: No   • Sexual activity: Defer         Review of Systems:  History obtained from chart review and the patient  General ROS: No fever or chills  Respiratory ROS: No cough, shortness of breath, wheezing  Cardiovascular ROS: No chest pain or palpitations  Gastrointestinal ROS: No abdominal pain or melena  Genito-Urinary ROS: No dysuria or hematuria  Psych ROS: No anxiety and depression    Objective:  Patient Vitals for the past 24 hrs:   BP Temp Temp src Pulse Resp SpO2 Weight   09/17/21 0745 -- -- -- 71 -- 100 % --   09/17/21 0730 -- 98.5 °F (36.9 °C) Oral 71 -- 98 % --   09/17/21 0715 -- -- -- 72 -- 98 % --   09/17/21 0700 134/66 -- -- 74 -- 96 % --   09/17/21 0645 -- -- -- 74 -- 94 % --   09/17/21 0630 -- -- -- 75 -- 97 % --   09/17/21 0617 -- -- -- 73 8 -- --   09/17/21 0611 -- -- -- 74 12 95 % --   09/17/21 0600 147/71 -- -- 73 14 94 % --   09/17/21 0545 -- -- -- 73 -- 95 % --   09/17/21 0530 -- -- -- 74 -- 95 % --   09/17/21 0515 -- -- -- 72 -- 98 % --   09/17/21 0500 168/71 -- -- 72 16 96 % --   09/17/21 0445 -- -- -- 74 -- 97 % --   09/17/21 0430 -- -- -- 73 -- 98 % --   09/17/21 0415 -- -- -- 75 -- 97 % --   09/17/21 0400 149/66 -- -- 75 16 97 % --   09/17/21 0345 -- 98.6 °F (37 °C) Axillary -- -- -- 74.5 kg (164 lb 3.9 oz)   09/17/21 0330 -- -- -- 75 -- 94 % --   09/17/21 0317 -- -- -- 76 17 95 % --   09/17/21 0315 -- -- -- 75 -- 95 % --   09/17/21 0300 152/70 -- -- 76 18 96 % --   09/17/21 0245 -- -- -- 75 -- 95 % --   09/17/21 0230 -- -- -- 76 -- 95 % --   09/17/21 0215 -- -- -- 75 -- 96 % --   09/17/21 0200 147/69 -- -- 76 16 95 % --   09/17/21 0145 -- -- -- 75 -- 95 % --   09/17/21 0130 -- -- -- 75 -- 95 % --   09/17/21 0115 -- -- -- 76 -- 94 % --   09/17/21 0100 138/67 -- -- 76 17 94 % --   09/17/21 0030 -- -- -- 75 -- 94 % --   09/17/21 0015 -- -- -- 77 -- 94 % --   09/17/21  0009 -- -- -- 76 16 100 % --   09/17/21 0003 -- -- -- 76 17 92 % --   09/17/21 0000 140/62 -- -- 77 -- 92 % --   09/16/21 2345 -- -- -- 77 -- 93 % --   09/16/21 2330 -- -- -- 77 -- 93 % --   09/16/21 2329 -- -- -- 76 16 93 % --   09/16/21 2328 -- 99.2 °F (37.3 °C) Axillary -- -- -- --   09/16/21 2315 -- -- -- 77 -- 92 % --   09/16/21 2300 131/60 -- -- 77 18 93 % --   09/16/21 2245 -- -- -- 78 -- 93 % --   09/16/21 2230 -- -- -- 78 -- 92 % --   09/16/21 2215 -- -- -- 79 -- 95 % --   09/16/21 2200 144/65 -- -- 82 16 96 % --   09/16/21 2145 -- -- -- 76 -- 93 % --   09/16/21 2130 -- -- -- 77 -- (!) 86 % --   09/16/21 2115 -- -- -- 78 -- 91 % --   09/16/21 2100 147/73 -- -- 78 19 95 % --   09/16/21 2045 -- -- -- 80 -- 94 % --   09/16/21 2030 -- -- -- 79 -- 95 % --   09/16/21 2015 -- -- -- 76 -- 95 % --   09/16/21 2000 129/61 98.7 °F (37.1 °C) Axillary 77 18 94 % --   09/16/21 1945 -- -- -- 75 -- 95 % --   09/16/21 1930 -- -- -- 75 -- 96 % --   09/16/21 1926 -- -- -- 76 15 100 % --   09/16/21 1920 -- -- -- 77 14 100 % --   09/16/21 1915 -- -- -- 76 -- 99 % --   09/16/21 1900 137/66 -- -- 77 (P) 16 97 % --   09/16/21 1800 156/80 -- -- 76 17 99 % --   09/16/21 1730 152/65 -- -- 75 -- 98 % --   09/16/21 1715 139/56 -- -- 74 -- 97 % --   09/16/21 1700 151/71 -- -- 74 15 97 % --   09/16/21 1645 150/67 -- -- 73 -- 96 % --   09/16/21 1630 150/66 -- -- 75 15 97 % --   09/16/21 1600 146/67 98.2 °F (36.8 °C) -- 76 15 96 % --   09/16/21 1545 142/67 -- -- -- -- -- --   09/16/21 1530 145/64 -- -- 75 15 95 % --   09/16/21 1515 142/64 -- -- 75 -- 95 % --   09/16/21 1500 142/63 -- -- 76 -- 95 % --   09/16/21 1445 137/61 -- -- 75 15 93 % --   09/16/21 1430 141/62 -- -- 76 -- 94 % --   09/16/21 1415 142/61 -- -- 78 -- 90 % --   09/16/21 1404 -- -- -- 78 -- 90 % --   09/16/21 1400 145/61 -- -- 78 -- (!) 89 % --   09/16/21 1345 -- -- -- 79 -- 93 % --   09/16/21 1330 -- -- -- 79 -- 93 % --   09/16/21 1315 -- -- -- 80 -- 93 % --   09/16/21  1300 143/62 -- -- 82 -- 92 % --   09/16/21 1245 -- -- -- 80 -- 93 % --   09/16/21 1230 -- -- -- 79 -- 93 % --   09/16/21 1216 -- -- -- 79 16 99 % --   09/16/21 1215 -- -- -- 79 -- 97 % --   09/16/21 1210 -- -- -- 79 16 93 % --   09/16/21 1200 148/65 99.2 °F (37.3 °C) Axillary 79 16 93 % --   09/16/21 1145 -- -- -- 79 -- 93 % --   09/16/21 1130 -- -- -- 80 -- 93 % --   09/16/21 1115 -- -- -- 79 -- 92 % --   09/16/21 1100 -- -- -- 80 16 90 % --   09/16/21 1045 -- -- -- 80 -- 90 % --   09/16/21 1030 -- -- -- 79 -- 90 % --   09/16/21 1015 -- -- -- 78 -- 91 % --   09/16/21 1000 127/64 -- -- 76 16 92 % --   09/16/21 0945 -- -- -- 77 -- 92 % --   09/16/21 0930 -- -- -- 78 -- 95 % --   09/16/21 0928 -- -- -- 78 16 100 % --   09/16/21 0922 -- -- -- 78 16 92 % --   09/16/21 0915 -- -- -- 78 -- 92 % --   09/16/21 0900 126/65 -- -- 78 16 91 % --   09/16/21 0845 -- -- -- 79 -- 94 % --       Intake/Output Summary (Last 24 hours) at 9/17/2021 0834  Last data filed at 9/17/2021 0739  Gross per 24 hour   Intake 1515.36 ml   Output --   Net 1515.36 ml     General: awake/alert   Chest:  clear to auscultation bilaterally without respiratory distress  CVS: regular rate and rhythm  Abdominal: soft, nontender, positive bowel sounds  Extremities: no cyanosis or edema  Skin: warm and dry without rash   Neuro: no focal motor deficits      Labs:  Results from last 7 days   Lab Units 09/17/21  0254 09/16/21  1008 09/16/21  0435   WBC 10*3/mm3 5.10 7.53 7.78   HEMOGLOBIN g/dL 7.0* 7.3* 8.7*   HEMATOCRIT % 23.1* 23.8* 26.9*   PLATELETS 10*3/mm3 160 162 191         Results from last 7 days   Lab Units 09/17/21  0254 09/16/21  1008 09/16/21  0435 09/12/21  0904 09/11/21  0511   SODIUM mmol/L 138 131* 131*   < > 138   SODIUM, ARTERIAL mmol/L  --   --  134*  --   --    POTASSIUM mmol/L 4.4 5.2 5.1   < > 2.9*   CHLORIDE mmol/L 98 90* 89*   < > 96*   CO2 mmol/L 31.0* 26.0 24.0   < > 27.0   BUN mg/dL 27* 45* 41*   < > 37*   CREATININE mg/dL 3.43* 4.52*  4.21*   < > 3.91*   CALCIUM mg/dL 8.6 8.5* 8.8   < > 8.5*   BILIRUBIN mg/dL 0.3  --  0.6  --  0.2   ALK PHOS U/L 144*  --  205*  --  108   ALT (SGPT) U/L 25  --  40*  --  7   AST (SGOT) U/L 27  --  81*  --  14   GLUCOSE mg/dL 132* 550* 566*   < > 122*    < > = values in this interval not displayed.       Radiology:   Imaging Results (Last 72 Hours)     Procedure Component Value Units Date/Time    XR Chest 1 View [[id number]] Collected: 09/16/21 0742     Updated: 09/16/21 0747    Narrative:      Frontal supine radiograph of the chest 9/16/2021 4:42 AM CDT     History: soa     Comparison: Chest x-ray dated 9/11/2021.      Findings:   Right IJ catheter has been removed. Subclavian stent on the left and  that along the left humerus.. Mild to moderate cardiomegaly.  Interstitial edema.. No measurable pneumothorax. Trace pleural effusions  suspected..       No acute osseous or soft tissue abnormality is noted.        Impression:      Impression:   1.   Findings of fluid overload..        This report was finalized on 09/16/2021 07:44 by Dr. Anabela Washington MD.    XR Chest 1 View [[id number]] Collected: 09/16/21 0738     Updated: 09/16/21 0744    Narrative:      Frontal supine radiograph of the chest 9/16/2021 5:53 AM CDT     History: CENTRAL LINE PLACEMENT; I16.1-Hypertensive emergency;  J81.0-Acute pulmonary edema; N18.9-Chronic kidney disease, unspecified;  R73.9-Hyperglycemia, unspecified; J96.01-Acute respiratory failure with  hypoxia     Comparison: Chest x-ray dated 9/16/2021      Findings:   There is a new right IJ catheters which terminates in the right atrium.  Left subclavian stent is redemonstrated. Partial visualization of the  vascular stent along the left humerus.. Bilateral central parenchymal  opacities in the left lower lung base opacity are grossly similar..  Moderate cardiomegaly..       No acute osseous or soft tissue abnormality is noted.        Impression:      Impression:   1.   Findings remain  concerning for fluid overload.  2.  New right IJ catheter, without measurable pneumothorax.        This report was finalized on 09/16/2021 07:41 by Dr. Anabela Washington MD.          Culture:  No results found for: BLOODCX, URINECX, WOUNDCX, MRSACX, RESPCX, STOOLCX      Assessment   1.  End stage renal disease on maintenance HD TTS  2.  Hypertensive urgency  3.  Type 2 diabetes  4.  Anemia of CKD  5.  Secondary hyperparathyroidism     Plan:  1.  Dialysis next due 9/18  2.  Wean Cardene as able. Increase Hydralazine to 75 mg TID  3.  Monitor labs      Delmar Salazar, GAEL  9/17/2021  08:34 CDT

## 2021-09-17 NOTE — OUTREACH NOTE
Medical Week 1 Survey      Responses   [facility] patient discharged from?  [place]   Does the patient have one of the following disease processes/diagnoses(primary or secondary)?  Other   Week 1 attempt successful?  No   Revoke  Readmitted          Vandana Self RN

## 2021-09-17 NOTE — PAYOR COMM NOTE
"9/17/21  [facility] [phone]  [phone]    PATIENT ER ADMISSION ON 9/16/21. INPATIENT ORDER.          Chemo Upton (51 y.o. Female)     Date of Birth Social Security Number Address Home Phone MRN    1970  [id number] [facility] [address] [phone] [id number]    Adventism Marital Status          Other        Admission Date Admission Type Admitting Provider Attending Provider Department, Room/Bed    9/16/21 Emergency Narinder Madrid MD Truong, Khai C, MD [facility] CARDIAC CARE, C002/1    Discharge Date Discharge Disposition Discharge Destination                       Attending Provider: Narinder Madrid MD    Allergies: Penicillins, Lamisil [Terbinafine], Reglan [Metoclopramide], Stadol [Butorphanol]    Isolation: None   Infection: None   Code Status: CPR    Ht: 160 cm (63\")   Wt: 74.5 kg (164 lb 3.9 oz)    Admission Cmt: None   Principal Problem: Hypertensive emergency [I16.1]                 Active Insurance as of 9/16/2021     Primary Coverage     Payor Plan Insurance Group Employer/Plan Group    WELLCARE [facility] MEDICARE REPLACEMENT WELLCARE MEDICARE REPLACEMENT      Payor Plan Address Payor Plan Phone Number Payor Plan Fax Number Effective Dates    [address] [address] [phone]  6/1/2021 - None Entered    [facility] [phone]       Subscriber Name Subscriber Birth Date Member ID       CHEMO UPTON 1970 [id number]           Secondary Coverage     Payor Plan Insurance Group Employer/Plan Group    [place]      Payor Plan Address Payor Plan Phone Number Payor Plan Fax Number Effective Dates    [address] [phone]  8/17/2021 - None Entered    [facility] [id number]       Subscriber Name Subscriber Birth Date Member ID       CHEMO UPTON 1970 [id number]                 Emergency Contacts      (Rel.) Home Phone Work Phone Mobile Phone    Kassandra Paez (Sister) [phone] -- [phone]    Conn, " "Dmitriy (Friend) -- -- [phone]        Encounter Information     Department Encounter #   9/16/2021  4:30 AM [facility] [id number]   Ken Moon MD   Physician   Medicine   H&P       Signed   Date of Service:  09/16/21 0624   Creation Time:  09/16/21 0624            Signed        Expand AllCollapse All      Show:Clear all  [x]Manual[x]Template[]Copied    Added by:  [x]Ken Moon MD    []Renaldo for details       [facility] Medicine Services  HISTORY AND PHYSICAL     Date of Admission: 9/16/2021  Primary Care Physician: Yaron Wiggins APRN     Subjective      Chief Complaint: Shortness of breath     History of Present Illness  51 year old female with PMH of ESRD, DM, HTN, poor compliance that presents to the ER 3 days post discharge with complaints of shortness of breath. Presented in respiratory distress that required BiPAP, blood pressure on presentation was 240/125. She was started on Cardene and Tridil drips for control. Feels better after initial interventions. A central and arterial lines were placed in the ER.      Review of Systems   Otherwise complete ROS reviewed and negative except as mentioned in the HPI.                    Past Medical History:   Diagnosis Date   • Anxiety     • Arthritis     • Chronic kidney disease       STAGE V RENAL FAILURE   • Depression     • Diabetes mellitus (CMS/HCC)     • HTN (hypertension)     • Hypothyroidism     • Obesity     • Tremors of nervous system           Vital Signs: /81   Pulse 96   Temp 98.6 °F (37 °C)   Resp 21   Ht 160 cm (63\")   Wt 84 kg (185 lb 3 oz)   LMP  (LMP Unknown)   SpO2 100%   BMI 32.80 kg/m²   Physical Exam  Vitals reviewed.   Constitutional:       General: She is in acute distress.      Appearance: She is well-developed. She is not toxic-appearing or diaphoretic.   HENT:      Head: Normocephalic and atraumatic.      Right Ear: External ear normal.      Left Ear: " External ear normal.      Nose: Nose normal.      Mouth/Throat:      Mouth: Mucous membranes are dry.      Pharynx: Oropharynx is clear.   Eyes:      General:         Right eye: No discharge.         Left eye: No discharge.      Extraocular Movements: Extraocular movements intact.      Conjunctiva/sclera: Conjunctivae normal.      Pupils: Pupils are equal, round, and reactive to light.   Neck:      Vascular: No JVD.   Cardiovascular:      Rate and Rhythm: Normal rate and regular rhythm.      Pulses: Normal pulses.      Heart sounds: Murmur heard.   No friction rub. No gallop.    Pulmonary:      Effort: Respiratory distress present.      Breath sounds: No stridor. Wheezing and rales present. No rhonchi.   Chest:      Chest wall: No tenderness.   Abdominal:      General: Bowel sounds are normal. There is no distension.      Palpations: Abdomen is soft.      Tenderness: There is no abdominal tenderness. There is no guarding or rebound.      Hernia: No hernia is present.   Musculoskeletal:         General: No swelling, tenderness or deformity. Normal range of motion.      Cervical back: Normal range of motion and neck supple. No rigidity or tenderness. No muscular tenderness.      Right lower leg: Edema present            Troponin [[id number]]  (Abnormal) Collected: 09/16/21 0435     Specimen: Blood Updated: 09/16/21 0521       Troponin T 0.188 ng/mL       Narrative:       Troponin T Reference Range:  <= 0.03 ng/mL-   Negative for AMI  >0.03 ng/mL-     Abnormal for myocardial necrosis.  Clinicians would have to utilize clinical acumen, EKG, Troponin and serial changes to determine if it is an Acute Myocardial Infarction or myocardial injury due to an underlying chronic condition.         Results may be falsely decreased if patient taking Biotin.        Comprehensive Metabolic Panel [[id number]]  (Abnormal) Collected: 09/16/21 0435     Specimen: Blood Updated: 09/16/21 0521       Glucose 566 mg/dL         BUN 41 mg/dL          Creatinine 4.21 mg/dL         Sodium 131 mmol/L         Potassium 5.1 mmol/L         Chloride 89 mmol/L         CO2 24.0 mmol/L         Calcium 8.8 mg/dL         Total Protein 7.8 g/dL         Albumin 4.10 g/dL         ALT (SGPT) 40 U/L         AST (SGOT) 81 U/L         Alkaline Phosphatase 205 U/L         Total Bilirubin 0.6 mg/dL         eGFR Non African Amer 11 mL/min/1.73         Comment: <15 Indicative of kidney failure.          eGFR   Amer --       Comment: <15 Indicative of kidney failure.          Globulin 3.7 gm/dL         A/G Ratio 1.1 g/dL         BUN/Creatinine Ratio 9.7       Anion Gap 18.0 mmol/L       Narrative:       GFR Normal >60  Chronic Kidney Disease <60  Kidney Failure <15        Magnesium [[id number]]  (Abnormal) Collected: 09/16/21 0435     Specimen: Blood Updated: 09/16/21 0501       Magnesium 2.7 mg/dL       aPTT [[id number]]  (Normal) Collected: 09/16/21 0435     Specimen: Blood Updated: 09/16/21 0452       PTT 33.1 seconds       Protime-INR [[id number]]  (Abnormal) Collected: 09/16/21 0435     Specimen: Blood Updated: 09/16/21 0452       Protime 15.4 Seconds         INR 1.27     CBC & Differential [[id number]]  (Abnormal) Collected: 09/16/21 0435     Specimen: Blood Updated: 09/16/21 0444     Narrative:       The following orders were created for panel order CBC & Differential.  Procedure                               Abnormality         Status                     ---------                               -----------         ------                     CBC Auto Differential[[id number]]        Abnormal            Final result                  Please view results for these tests on the individual orders.     CBC Auto Differential [[id number]]  (Abnormal) Collected: 09/16/21 0435     Specimen: Blood Updated: 09/16/21 0444       WBC 7.78 10*3/mm3         RBC 2.86 10*6/mm3         Hemoglobin 8.7 g/dL         Hematocrit 26.9 %         MCV 94.1 fL         MCH 30.4 pg         MCHC 32.3 g/dL          RDW 15.6 %         RDW-SD 54.0 fl         MPV 9.5 fL         Platelets 191 10*3/mm3         Neutrophil % 70.8 %         Lymphocyte % 20.1 %         Monocyte % 6.4 %         Eosinophil % 1.3 %         Basophil % 1.0 %         Immature Grans % 0.4 %         Neutrophils, Absolute 5.51 10*3/mm3         Lymphocytes, Absolute 1.56 10*3/mm3         Monocytes, Absolute 0.50 10*3/mm3         Eosinophils, Absolute 0.10 10*3/mm3         Basophils, Absolute 0.08 10*3/mm3         Immature Grans, Absolute 0.03 10*3/mm3         nRBC 0.0 /100 WBC       Blood Gas, Arterial With Co-Ox [[id number]]  (Abnormal) Collected: 09/16/21 0435     Specimen: Arterial Blood Updated: 09/16/21 0443       Site Left Radial       Aaron's Test N/A       pH, Arterial 7.385 pH units         pCO2, Arterial 45.1 mm Hg         Comment: 83 Value above reference range          pO2, Arterial 113.0 mm Hg         Comment: 83 Value above reference range          HCO3, Arterial 27.0 mmol/L         Comment: 83 Value above reference range          Base Excess, Arterial 1.7 mmol/L         O2 Saturation, Arterial 99.1 %         Comment: 83 Value above reference range          Hemoglobin, Blood Gas 8.7 g/dL         Comment: 84 Value below reference range          Hematocrit, Blood Gas 26.7 %         Comment: 84 Value below reference range          Oxyhemoglobin 94.2 %         Methemoglobin 1.00 %         Carboxyhemoglobin 4.0 %         A-a Gradiant 544.6 mmHg         Temperature 37.0 C         Sodium, Arterial 134 mmol/L         Comment: 84 Value below reference range          Potassium, Arterial 4.9 mmol/L         Barometric Pressure for Blood Gas 750 mmHg         Modality BiPap       FIO2 100 %         Ventilator Mode NA       IPAP 16       Comment: Meter: [id number]     :  [id number]          EPAP 8       Note --       Collected by [id number]       pH, Temp Corrected 7.385 pH Units         pCO2, Temperature Corrected 45.1 mm Hg         pO2,  Temperature Corrected 113 mm Hg       Brunson Top [[id number]] Collected: 09/16/21 0435     Specimen: Blood Updated: 09/16/21 0440                   Imaging Results (Last 24 Hours)      Procedure Component Value Units Date/Time     XR Chest 1 View [[id number]] Resulted: 09/16/21 0553       Updated: 09/16/21 0554     XR Chest 1 View [[id number]] Resulted: 09/16/21 0442       Updated: 09/16/21 0443          I have personally reviewed and interpreted the radiology studies and ECG obtained at time of admission.      Assessment / Plan      Assessment:        Active Hospital Problems     Diagnosis     • **Hypertensive emergency     • Gastroparesis due to DM (CMS/HCC)     • Acute pulmonary edema (CMS/HCC)     • Hypothyroidism     • Uncontrolled diabetes mellitus (CMS/HCC)     • End stage kidney disease (CMS/HCC)     • Seizure (CMS/HCC)     • Intractable nausea and vomiting        Plan:   Admit to critical care  Vitals per unit  Npo   Continue Tridil and Cardene drips to maintain blood pressure  Restart home Procardia, Lisinopril, Hydralazine, Clonidine and Coreg  BiPAP to nasal canula when distress resolved possibly in early AM  Repeat blood gas  Nephrology consult for HD  Insulin high dose sliding scale  Continue home dosed Levemir at 40 units daily     Compliance with medications advised     Code Status/Advanced Care Plan: Full     The patient's surrogate decision maker is see records.      I discussed my findings and recommendations with the patient.     Estimated length of stay is over 2 midnights.      The patient was seen and examined by me on 9/16/2021 at 0601.     Electronically signed by Ken Moon MD, 09/16/21, 07:01 CDT.                            ED to Hosp-Admission (Current) on 9/16/2021     ED to Hosp-Admission (Current) on 9/16/2021        Routing History    2021  4:30 AM [facility] [id number]   Delmar Salazar, APRN   Nurse Practitioner   Nephrology   Consults       Attested   Date of  Service:  09/16/21 0909   Creation Time:  09/16/21 0909         Consult Orders   Inpatient Nephrology Consult [[id number]] ordered by Ken Moon MD at 09/16/21 0721          Attested        Attestation signed by Brandon Ann MD at 09/16/21 1435   I have personally examined the patient and reviewed all the data.  I also agree with the history and physical examination as well as plan generated by my nurse practitioner.    Chief complaint: End-stage renal disease/shortness of breath.  51 years old woman who has end-stage renal disease secondary to diabetic nephropathy and goes to [facility] dialysis clinic and Tuesday Thursday Saturday.  She was just discharged from the hospital this week now came back with increasing shortness of breath and severe hypertension.  She is admitted to CCU, currently receiving intravenous Cardizem drip.  However she is maintaining good oxygenation on nasal cannula.  Chest x-ray confirmed volume overload.    Assessment:  1.  End-stage renal disease/currently seen on hemodialysis  Hemodialysis access: AV fistula  Hemodialysis: 3 hours  Ultrafiltration: 3000 cc  2K bath  Blood flow rate is 150 cc/min.  2.  Severe systolic and diastolic hypertension.  3.  Type II diabetic nephropathy.  4.  Poor dialysis compliance.  5.  Anemia of chronic kidney disease    Plan:  1.  Tolerating dialysis well.  2.  Ultrafiltration up to 3000 cc  3.  Try to wean off Cardene                   This report was finalized on 09/16/2021 07:44 by Dr. Anabela Washington MD.      XR Chest 1 View [[id number]] Collected: 09/16/21 0738       Updated: 09/16/21 0744     Narrative:       Frontal supine radiograph of the chest 9/16/2021 5:53 AM CDT     History: CENTRAL LINE PLACEMENT; I16.1-Hypertensive emergency;  J81.0-Acute pulmonary edema; N18.9-Chronic kidney disease, unspecified;  R73.9-Hyperglycemia, unspecified; J96.01-Acute respiratory failure with  hypoxia     Comparison: Chest x-ray dated 9/16/2021       Findings:   There is a new right IJ catheters which terminates in the right atrium.  Left subclavian stent is redemonstrated. Partial visualization of the  vascular stent along the left humerus.. Bilateral central parenchymal  opacities in the left lower lung base opacity are grossly similar..  Moderate cardiomegaly..       No acute osseous or soft tissue abnormality is noted.         Impression:       Impression:   1.   Findings remain concerning for fluid overload.  2.  New right IJ catheter, without measurable pneumothorax.        This report was finalized on 09/16/2021 07:41 by Dr. Anabela Washington MD.             Culture:  No results found for: BLOODCX, URINECX, WOUNDCX, MRSACX, RESPCX, STOOLCX        Assessment   1.  End stage renal disease on maintenance HD TTS  2.  Hypertensive urgency  3.  Type 2 diabetes  4.  Anemia of CKD  5.  Secondary hyperparathyroidism     Plan:  1.  Dialysis today  2.  Wean Tridil as able  3.  Further assessment and plan pending Dr Ann's evaluation of patient        Thank you for the consult, we appreciate the opportunity to provide care to your patients.  Feel free to contact me if I can be of any further assistance.       Delmar Salazar, APRN  9/16/2021  09:09 CDT            Cosigned by: Brandon Ann MD at 09/16/21 1433    ED to Hosp-Admission (Current) on 9/16/2021                      Current Facility-Administered Medications   Medication Dose Route Frequency Provider Last Rate Last Admin   • atorvastatin (LIPITOR) tablet 10 mg  10 mg Oral Daily Ken Moon MD       • carvedilol (COREG) tablet 12.5 mg  12.5 mg Oral BID With Meals Ken Moon MD   12.5 mg at 09/16/21 1758   • cloNIDine (CATAPRES) tablet 0.3 mg  0.3 mg Oral Q8H Ken Moon MD   0.3 mg at 09/17/21 0601   • dextrose (D50W) 25 g/ 50mL Intravenous Solution 25 g  25 g Intravenous Q15 Min PRN Ken Moon MD       • dextrose (GLUTOSE) oral gel 15 g  15 g Oral Q15  Min PRN Ken Moon MD       • DULoxetine (CYMBALTA) DR capsule 60 mg  60 mg Oral Daily Ken Moon MD       • epoetin yolanda-epbx (RETACRIT) injection 10,000 Units  10,000 Units Subcutaneous Once per day on Tue Thu Sat Brandon Ann MD   10,000 Units at 09/16/21 1758   • famotidine (PEPCID) injection 20 mg  20 mg Intravenous Daily Narinder Madrid MD       • glucagon (human recombinant) (GLUCAGEN DIAGNOSTIC) injection 1 mg  1 mg Subcutaneous Q15 Min PRN Ken Moon MD       • hydrALAZINE (APRESOLINE) tablet 50 mg  50 mg Oral TID Ken Moon MD   50 mg at 09/16/21 2026   • insulin detemir (LEVEMIR) injection 15 Units  15 Units Subcutaneous Q12H Narinder Madrid MD   15 Units at 09/16/21 1134   • insulin lispro (humaLOG) injection 0-24 Units  0-24 Units Subcutaneous TID AC Ken Moon MD   4 Units at 09/17/21 0632   • insulin lispro (humaLOG) injection 5 Units  5 Units Subcutaneous TID With Meals Ken Moon MD   5 Units at 09/17/21 0739   • ipratropium-albuterol (DUO-NEB) nebulizer solution 3 mL  3 mL Nebulization Q6H - RT Ken Moon MD   3 mL at 09/17/21 0611   • ipratropium-albuterol (DUO-NEB) nebulizer solution 3 mL  3 mL Nebulization Q6H PRN Ken Moon MD       • lactulose (CHRONULAC) 10 GM/15ML solution 20 g  20 g Oral BID Ken Moon MD   20 g at 09/16/21 2026   • levothyroxine (SYNTHROID, LEVOTHROID) tablet 25 mcg  25 mcg Oral Q AM Ken Moon MD   25 mcg at 09/17/21 0601   • lisinopril (PRINIVIL,ZESTRIL) tablet 20 mg  20 mg Oral Q24H Ken Moon MD       • niCARdipine (CARDENE) 25 mg in 250 mL NS infusion  5-15 mg/hr Intravenous Titrated Ken Moon MD 50 mL/hr at 09/17/21 0529 5 mg/hr at 09/17/21 0529   • NIFEdipine XL (PROCARDIA XL) 24 hr tablet 60 mg  60 mg Oral Q24H Ken Moon MD       • nitroglycerin (TRIDIL) 200 mcg/ml infusion  100  mcg/min Intravenous Titrated Ken Moon MD   Stopped at 09/16/21 1358   • ondansetron (ZOFRAN) injection 4 mg  4 mg Intravenous Q6H PRN Narinder Madrid MD       • pregabalin (LYRICA) capsule 75 mg  75 mg Oral Daily Ken Moon MD       • rOPINIRole (REQUIP) tablet 4 mg  4 mg Oral Nightly Ken Moon MD   4 mg at 09/16/21 2026   • sodium chloride 0.9 % flush 10 mL  10 mL Intravenous PRN Ken Moon MD       • sodium chloride 0.9 % flush 10 mL  10 mL Intravenous Q12H Ken Moon MD   10 mL at 09/16/21 2026   • sodium chloride 0.9 % flush 10 mL  10 mL Intravenous PRN Ken Moon MD

## 2021-09-18 NOTE — DISCHARGE INSTRUCTIONS
Discharge Care Plan/Instructions:   1.  Return for any acute or worsening symptoms.  2.  Importance of compliance with medication regimen and hemodialysis discussed.  3.  Numerous medication adjustments as above.  Blood pressure medications have been adjusted and patient is instructed to monitor her blood pressure readings at home.  4.  Synthroid dosage increased.  Will need repeat thyroid function testing as an outpatient in the next 6 weeks.

## 2021-09-18 NOTE — NURSING NOTE
PATIENT DONT HAVE RIDE HOME, SHE IS ASKING FOR A RIDE VOUCHER. HOUSE SUPERVISOR MADE AWARE. VOUCHER GIVEN TO PT.

## 2021-09-18 NOTE — PROGRESS NOTES
Nephrology ([facility]) Progress Note      Patient:  Ena Upton  YOB: 1970  Date of Service: 9/18/2021  MRN: [id number]   Acct: [id number]   Primary Care Physician: Yaron Wiggins APRN  Advance Directive:   Code Status and Medical Interventions:   Ordered at: 09/16/21 0716     Code Status:    CPR     Medical Interventions (Level of Support Prior to Arrest):    Full     Admit Date: 9/16/2021       Hospital Day: 2  Referring Provider: Ken Moon,*      Patient personally seen and examined.  Complete chart including Consults, Notes, Operative Reports, Labs, Cardiology, and Radiology studies reviewed as able.    Chief complaint: Severe hypertension/end-stage renal disease.    Subjective:  Ena Upton is a 51 y.o. female  whom we were consulted for end stage renal disease. Maintenance hemodialysis Tuesday Thursday Saturday. Just discharged from hospital three days ago on 9/13 after being treated for hypoglycemia. Patient apparently missed HD on 9/14. Presented to ER this time with dyspnea, elevated blood pressure, initially very lethargic. Placed on NTG and Cardene drips for BP control. Currently is in CCU.recieved dialysis on 9/16, mentation improved after treatment.  Patient is now moved to regular floor.  Cardene has been weaned off.    This afternoon she feels well.  She denies any shortness of breath and her blood pressure has improved.    Currently seen on hemodialysis  Hemodialysis access: Permacath  Hemodialysis: 3-1/2-hour  Ultrafiltration: 3000 cc  2K bath  Blood flow rate is 450 cc/min.    Allergies:  Penicillins, Lamisil [terbinafine], Reglan [metoclopramide], and Stadol [butorphanol]    Home Meds:  Medications Prior to Admission   Medication Sig Dispense Refill Last Dose   • DULoxetine (CYMBALTA) 60 MG capsule Take 60 mg by mouth daily.   9/15/2021 at Unknown time   • insulin aspart (novoLOG) 100 UNIT/ML injection Inject 10 Units under the  skin into the appropriate area as directed 3 (Three) Times a Day Before Meals.   9/15/2021 at Unknown time   • Insulin Glargine (BASAGLAR KWIKPEN) 100 UNIT/ML injection pen Inject 40 Units under the skin into the appropriate area as directed Every Night.   9/15/2021 at Unknown time   • lactulose (CHRONULAC) 10 GM/15ML solution Take 20 g by mouth 3 (Three) Times a Day.   9/15/2021 at Unknown time   • NIFEdipine XL (PROCARDIA XL) 60 MG 24 hr tablet Take 60 mg by mouth 2 (two) times a day.   9/15/2021 at Unknown time   • ondansetron (ZOFRAN) 4 MG tablet Take 4 mg by mouth Every 8 (Eight) Hours As Needed for nausea or vomiting.   Past Month at Unknown time   • pregabalin (LYRICA) 75 MG capsule Take 75 mg by mouth 2 (Two) Times a Day.   9/15/2021 at Unknown time   • rOPINIRole (REQUIP) 4 MG tablet Take 4 mg by mouth Every Night.   9/15/2021 at Unknown time   • simvastatin (ZOCOR) 20 MG tablet Take 40 mg by mouth Daily.   9/15/2021 at Unknown time   • [DISCONTINUED] carvedilol (COREG) 12.5 MG tablet Take 1 tablet by mouth 2 (Two) Times a Day With Meals.   9/16/2021 at Unknown time   • [DISCONTINUED] cloNIDine (CATAPRES) 0.2 MG tablet Take 1 tablet by mouth Every 8 (Eight) Hours. (Patient taking differently: Take 0.3 mg by mouth Every 8 (Eight) Hours.)   9/15/2021 at Unknown time   • [DISCONTINUED] hydrALAZINE (APRESOLINE) 50 MG tablet Take 50 mg by mouth 3 (Three) Times a Day.   9/15/2021 at Unknown time   • [DISCONTINUED] levothyroxine (SYNTHROID, LEVOTHROID) 25 MCG tablet Take 25 mcg by mouth Daily.   9/15/2021 at Unknown time   • [DISCONTINUED] lisinopril (PRINIVIL,ZESTRIL) 20 MG tablet Take 20 mg by mouth 2 (two) times a day.   9/15/2021 at Unknown time   • albuterol (PROVENTIL HFA;VENTOLIN HFA) 108 (90 BASE) MCG/ACT inhaler Inhale 2 puffs Every 4 (Four) Hours As Needed for wheezing.   More than a month at Unknown time       Medicines:  Current Facility-Administered Medications   Medication Dose Route Frequency  Provider Last Rate Last Admin   • atorvastatin (LIPITOR) tablet 10 mg  10 mg Oral Daily Alejandra Kruse, APRN   10 mg at 09/18/21 1347   • carvedilol (COREG) tablet 25 mg  25 mg Oral BID With Meals Brandon Ann MD   25 mg at 09/18/21 0833   • cloNIDine (CATAPRES) tablet 0.3 mg  0.3 mg Oral Q8H WoAlejandra hung APRN   0.3 mg at 09/18/21 1353   • dextrose (D50W) 25 g/ 50mL Intravenous Solution 25 g  25 g Intravenous Q15 Min PRN Alejandra Kruse, APRN       • dextrose (GLUTOSE) oral gel 15 g  15 g Oral Q15 Min PRN Alejandra Kruse APRN   15 g at 09/18/21 1347   • DULoxetine (CYMBALTA) DR capsule 60 mg  60 mg Oral Daily Alejandra Kruse, APRN   60 mg at 09/18/21 1348   • epoetin yolanda-epbx (RETACRIT) injection 10,000 Units  10,000 Units Subcutaneous Once per day on Tue Thu Sat Alejandra Kruse APRN   10,000 Units at 09/16/21 1758   • famotidine (PEPCID) injection 20 mg  20 mg Intravenous Daily Alejandra Kruse APRN   20 mg at 09/18/21 0833   • glucagon (human recombinant) (GLUCAGEN DIAGNOSTIC) injection 1 mg  1 mg Subcutaneous Q15 Min PRN Alejandra Kruse APRN       • hydrALAZINE (APRESOLINE) tablet 75 mg  75 mg Oral TID Alejandra Kurse, APRN   75 mg at 09/18/21 1348   • insulin detemir (LEVEMIR) injection 40 Units  40 Units Subcutaneous Nightly Alejandra Kruse APRN       • insulin lispro (humaLOG) injection 10 Units  10 Units Subcutaneous TID With Meals Alejandra Kruse APRN   10 Units at 09/18/21 0832   • ipratropium-albuterol (DUO-NEB) nebulizer solution 3 mL  3 mL Nebulization Q6H - RT Alejandra Kruse APRN   3 mL at 09/18/21 0715   • ipratropium-albuterol (DUO-NEB) nebulizer solution 3 mL  3 mL Nebulization Q6H PRN Alejandra Kruse APRN       • lactulose (CHRONULAC) 10 GM/15ML solution 20 g  20 g Oral BID Alejandra Kruse APRN   20 g at 09/18/21 1336   • levothyroxine (SYNTHROID, LEVOTHROID) tablet 50 mcg  50 mcg Oral Q AM Alejandra Kruse APRN   50 mcg at 09/18/21 1099    • lisinopril (PRINIVIL,ZESTRIL) tablet 40 mg  40 mg Oral Q24H Brandon Ann MD   40 mg at 09/18/21 1348   • niCARdipine (CARDENE) 25 mg in 250 mL NS infusion  5-15 mg/hr Intravenous Titrated Woeltz, Alejandra K, APRN   Stopped at 09/17/21 0900   • NIFEdipine XL (PROCARDIA XL) 24 hr tablet 60 mg  60 mg Oral Q24H Woeltz, Alejandra K, APRN   60 mg at 09/18/21 1347   • nitroglycerin (TRIDIL) 200 mcg/ml infusion  100 mcg/min Intravenous Titrated Woeltz, Alejandra K, APRN   Stopped at 09/16/21 1358   • ondansetron (ZOFRAN) injection 4 mg  4 mg Intravenous Q6H PRN Woeltz, Alejandra K, APRN       • pregabalin (LYRICA) capsule 75 mg  75 mg Oral Daily Woeltz, Alejandra K, APRN   75 mg at 09/18/21 1352   • rOPINIRole (REQUIP) tablet 4 mg  4 mg Oral Nightly Woeltz, Alejandra K, APRN   4 mg at 09/17/21 2224   • sodium chloride 0.9 % flush 10 mL  10 mL Intravenous PRN Woeltz, Alejandra K, APRN       • sodium chloride 0.9 % flush 10 mL  10 mL Intravenous Q12H Woeltz, Alejandra K, APRN   10 mL at 09/18/21 0833   • sodium chloride 0.9 % flush 10 mL  10 mL Intravenous PRN Woeltz, Alejandra K, APRN           Past Medical History:  Past Medical History:   Diagnosis Date   • Anxiety    • Arthritis    • Chronic kidney disease     STAGE V RENAL FAILURE   • Depression    • Diabetes mellitus (CMS/HCC)    • HTN (hypertension)    • Hypothyroidism    • Obesity    • Tremors of nervous system        Past Surgical History:  Past Surgical History:   Procedure Laterality Date   • ARTERIOVENOUS FISTULA     • BREAST BIOPSY     • CARPAL TUNNEL RELEASE     • CATH LAB PROCEDURE     • CHOLECYSTECTOMY     • TUBAL ABDOMINAL LIGATION         Family History  Family History   Problem Relation Age of Onset   • Cancer Other    • Hypertension Other    • Kidney disease Other    • Heart disease Other    • Diabetes Other    • Diabetes Mother    • Diabetes Maternal Aunt    • Diabetes Maternal Grandmother        Social History  Social History     Socioeconomic History   •  "Marital status:      Spouse name: Not on file   • Number of children: Not on file   • Years of education: Not on file   • Highest education level: Not on file   Tobacco Use   • Smoking status: Current Every Day Smoker     Packs/day: 0.50   • Smokeless tobacco: Never Used   • Tobacco comment: CUTTING BACK AND TRYING TO STOP SMOKING   Substance and Sexual Activity   • Alcohol use: No   • Drug use: No   • Sexual activity: Defer         Review of Systems:  History obtained from chart review and the patient  General ROS: No fever or chills  Respiratory ROS: No cough, shortness of breath, wheezing  Cardiovascular ROS: No chest pain or palpitations  Gastrointestinal ROS: No abdominal pain or melena  Genito-Urinary ROS: No dysuria or hematuria  Psych ROS: No anxiety and depression    Objective:  Patient Vitals for the past 24 hrs:   BP Temp Temp src Pulse Resp SpO2 Height Weight   09/18/21 1340 (!) 184/75 98.1 °F (36.7 °C) Oral 67 16 94 % -- --   09/18/21 0835 (!) 187/70 97.9 °F (36.6 °C) Oral 62 16 97 % -- --   09/18/21 0721 -- -- -- 61 16 99 % -- --   09/18/21 0715 -- -- -- 61 14 97 % -- --   09/18/21 0713 -- -- -- 62 -- 96 % -- --   09/18/21 0410 171/68 98 °F (36.7 °C) Oral 61 16 95 % -- --   09/18/21 0316 -- -- -- 63 -- 94 % -- --   09/18/21 0209 168/69 97.8 °F (36.6 °C) Oral 63 16 100 % -- --   09/17/21 2351 -- -- -- 68 20 98 % -- --   09/17/21 2341 -- -- -- 69 16 99 % -- --   09/17/21 2046 -- -- -- 70 15 100 % -- --   09/17/21 2000 127/61 98.9 °F (37.2 °C) Oral 69 16 95 % 160 cm (62.99\") 76.7 kg (169 lb)   09/17/21 1855 -- -- -- 68 16 92 % -- --   09/17/21 1847 -- -- -- 64 16 91 % -- --   09/17/21 1543 127/66 99 °F (37.2 °C) Oral 75 16 92 % -- --       Intake/Output Summary (Last 24 hours) at 9/18/2021 1540  Last data filed at 9/18/2021 0835  Gross per 24 hour   Intake 590 ml   Output --   Net 590 ml     General: awake/alert   HEENT: Normocephalic atraumatic head  Chest:  clear to auscultation bilaterally " without respiratory distress  CVS: regular rate and rhythm  Abdominal: soft, nontender, positive bowel sounds  Extremities: no cyanosis or edema  Skin: warm and dry without rash   Neuro: no focal motor deficits      Labs:  Results from last 7 days   Lab Units 09/18/21  0650 09/17/21  0254 09/16/21  1008   WBC 10*3/mm3 5.37 5.10 7.53   HEMOGLOBIN g/dL 7.1* 7.0* 7.3*   HEMATOCRIT % 23.5* 23.1* 23.8*   PLATELETS 10*3/mm3 163 160 162         Results from last 7 days   Lab Units 09/18/21  0650 09/17/21  0254 09/16/21  1008 09/16/21  0435 09/16/21  0435   SODIUM mmol/L 134* 138 131*   < > 131*   SODIUM, ARTERIAL mmol/L  --   --   --   --  134*   POTASSIUM mmol/L 4.9 4.4 5.2   < > 5.1   CHLORIDE mmol/L 93* 98 90*   < > 89*   CO2 mmol/L 28.0 31.0* 26.0   < > 24.0   BUN mg/dL 42* 27* 45*   < > 41*   CREATININE mg/dL 5.02* 3.43* 4.52*   < > 4.21*   CALCIUM mg/dL 9.0 8.6 8.5*   < > 8.8   BILIRUBIN mg/dL 0.3 0.3  --   --  0.6   ALK PHOS U/L 143* 144*  --   --  205*   ALT (SGPT) U/L 21 25  --   --  40*   AST (SGOT) U/L 22 27  --   --  81*   GLUCOSE mg/dL 157* 132* 550*   < > 566*    < > = values in this interval not displayed.       Radiology:   Imaging Results (Last 72 Hours)     Procedure Component Value Units Date/Time    XR Chest 1 View [[id number]] Collected: 09/16/21 0742     Updated: 09/16/21 0747    Narrative:      Frontal supine radiograph of the chest 9/16/2021 4:42 AM CDT     History: soa     Comparison: Chest x-ray dated 9/11/2021.      Findings:   Right IJ catheter has been removed. Subclavian stent on the left and  that along the left humerus.. Mild to moderate cardiomegaly.  Interstitial edema.. No measurable pneumothorax. Trace pleural effusions  suspected..       No acute osseous or soft tissue abnormality is noted.        Impression:      Impression:   1.   Findings of fluid overload..        This report was finalized on 09/16/2021 07:44 by Dr. Anabela Washington MD.    XR Chest 1 View [[id number]] Collected:  09/16/21 0738     Updated: 09/16/21 0744    Narrative:      Frontal supine radiograph of the chest 9/16/2021 5:53 AM CDT     History: CENTRAL LINE PLACEMENT; I16.1-Hypertensive emergency;  J81.0-Acute pulmonary edema; N18.9-Chronic kidney disease, unspecified;  R73.9-Hyperglycemia, unspecified; J96.01-Acute respiratory failure with  hypoxia     Comparison: Chest x-ray dated 9/16/2021      Findings:   There is a new right IJ catheters which terminates in the right atrium.  Left subclavian stent is redemonstrated. Partial visualization of the  vascular stent along the left humerus.. Bilateral central parenchymal  opacities in the left lower lung base opacity are grossly similar..  Moderate cardiomegaly..       No acute osseous or soft tissue abnormality is noted.        Impression:      Impression:   1.   Findings remain concerning for fluid overload.  2.  New right IJ catheter, without measurable pneumothorax.        This report was finalized on 09/16/2021 07:41 by Dr. Anabela Washington MD.          Culture:  No results found for: BLOODCX, URINECX, WOUNDCX, MRSACX, RESPCX, STOOLCX      Assessment   1.    End-stage renal disease/currently seen on hemodialysis.  2.  Severe systolic and diastolic hypertension improved.  3.  Pulmonary edema now resolved.  4.  Type II diabetic nephropathy.  5.  Anemia of chronic kidney disease  6..  Secondary hyperparathyroidism     Plan:  1.  Tolerating dialysis very well.  2.  Adjust p.o. blood pressure medications.  3.  Monitor labs      Brandon Ann MD  9/18/2021  15:40 CDT

## 2021-09-18 NOTE — PLAN OF CARE
Problem: Adult Inpatient Plan of Care  Goal: Plan of Care Review  Outcome: Ongoing, Progressing  Flowsheets (Taken 9/18/2021 0404)  Progress: improving  Plan of Care Reviewed With: patient  Outcome Summary: Pt has had no c/o pain. Normal sinus 63-71 on tele. Bipap when asleep. SCDs in place. Safety maintained.

## 2021-09-18 NOTE — NURSING NOTE
Took report from dialysis nurse that this patient had 3L removed with a last bp of 182-83 and a heart rate of 64

## 2021-09-18 NOTE — DISCHARGE SUMMARY
[facility] Medicine Services  DISCHARGE SUMMARY       Date of Admission: 9/16/2021  Date of Discharge:  9/18/2021  Primary Care Physician: Yaron Wiggins APRN    Presenting Problem/History of Present Illness:  Shortness of breath     Final Discharge Diagnoses:  Active Hospital Problems    Diagnosis    • **Hypertensive emergency    • Acute pulmonary edema (CMS/HCC)    • Hypothyroidism    • Uncontrolled diabetes mellitus (CMS/HCC)    • End stage kidney disease (CMS/HCC)    • Seizure (CMS/HCC)    • Gastroparesis due to DM (CMS/HCC)    • Intractable nausea and vomiting      Consults:   1.  Nephrology    Procedures Performed: None    Pertinent Test Results:   Lab Results (all)     Procedure Component Value Units Date/Time    POC Glucose Once [[id number]]  (Abnormal) Collected: 09/18/21 1404    Specimen: Blood Updated: 09/18/21 1434     Glucose 61 mg/dL      Comment: : [id number] [employer]zone Media Solutionseter ID: [id number]       POC Glucose Once [[id number]]  (Abnormal) Collected: 09/18/21 1345    Specimen: Blood Updated: 09/18/21 1357     Glucose 34 mg/dL      Comment: NOTEDOperator: [id number] Ozone Media [employer]eter ID: [id number]       POC Glucose Once [[id number]]  (Abnormal) Collected: 09/18/21 1338    Specimen: Blood Updated: 09/18/21 1357     Glucose 35 mg/dL      Comment: : [id number] [employer]eter ID: [id number]       Blood Culture - Blood, Arm, Right [[id number]] Collected: 09/16/21 1008    Specimen: Blood from Arm, Right Updated: 09/18/21 1045     Blood Culture No growth at 2 days    Blood Culture - Blood, Wrist, Right [[id number]] Collected: 09/16/21 1020    Specimen: Blood from Wrist, Right Updated: 09/18/21 1045     Blood Culture No growth at 2 days    Comprehensive Metabolic Panel [[id number]]  (Abnormal) Collected: 09/18/21 0650    Specimen: Blood Updated: 09/18/21 0752     Glucose 157 mg/dL      BUN 42 mg/dL      Creatinine 5.02 mg/dL      Sodium 134 mmol/L      Potassium 4.9 mmol/L       Chloride 93 mmol/L      CO2 28.0 mmol/L      Calcium 9.0 mg/dL      Total Protein 6.4 g/dL      Albumin 3.30 g/dL      ALT (SGPT) 21 U/L      AST (SGOT) 22 U/L      Alkaline Phosphatase 143 U/L      Total Bilirubin 0.3 mg/dL      eGFR Non African Amer 9 mL/min/1.73      Comment: <15 Indicative of kidney failure.        eGFR   Amer --     Comment: <15 Indicative of kidney failure.        Globulin 3.1 gm/dL      A/G Ratio 1.1 g/dL      BUN/Creatinine Ratio 8.4     Anion Gap 13.0 mmol/L     Narrative:      GFR Normal >60  Chronic Kidney Disease <60  Kidney Failure <15      CBC & Differential [[id number]]  (Abnormal) Collected: 09/18/21 0650    Specimen: Blood Updated: 09/18/21 0721    Narrative:      The following orders were created for panel order CBC & Differential.  Procedure                               Abnormality         Status                     ---------                               -----------         ------                     CBC Auto Differential[[id number]]        Abnormal            Final result                 Please view results for these tests on the individual orders.    CBC Auto Differential [[id number]]  (Abnormal) Collected: 09/18/21 0650    Specimen: Blood Updated: 09/18/21 0721     WBC 5.37 10*3/mm3      RBC 2.39 10*6/mm3      Hemoglobin 7.1 g/dL      Hematocrit 23.5 %      MCV 98.3 fL      MCH 29.7 pg      MCHC 30.2 g/dL      RDW 16.1 %      RDW-SD 57.1 fl      MPV 10.3 fL      Platelets 163 10*3/mm3      Neutrophil % 63.7 %      Lymphocyte % 19.4 %      Monocyte % 10.4 %      Eosinophil % 4.8 %      Basophil % 1.3 %      Immature Grans % 0.4 %      Neutrophils, Absolute 3.42 10*3/mm3      Lymphocytes, Absolute 1.04 10*3/mm3      Monocytes, Absolute 0.56 10*3/mm3      Eosinophils, Absolute 0.26 10*3/mm3      Basophils, Absolute 0.07 10*3/mm3      Immature Grans, Absolute 0.02 10*3/mm3      nRBC 0.0 /100 WBC     POC Glucose Once [[id number]]  (Abnormal) Collected: 09/17/21 2002     Specimen: Blood Updated: 09/17/21 2029     Glucose 255 mg/dL      Comment: : [id number] Blakemore DominiqueMeter ID: [id number]       POC Glucose Once [[id number]]  (Normal) Collected: 09/17/21 1612    Specimen: Blood Updated: 09/17/21 1625     Glucose 120 mg/dL      Comment: : [id number] Sriram (DeBoe) AutumnMeter ID: [id number]       POC Glucose Once [[id number]]  (Abnormal) Collected: 09/17/21 1139    Specimen: Blood Updated: 09/17/21 1150     Glucose 155 mg/dL      Comment: : [id number] Dunham TaylorMeter ID: [id number]       POC Glucose Once [[id number]]  (Abnormal) Collected: 09/17/21 0831    Specimen: Blood Updated: 09/17/21 0843     Glucose 152 mg/dL      Comment: : [id number] Roly PlasenciaMeter ID: [id number]       T4, Free [[id number]]  (Abnormal) Collected: 09/17/21 0254    Specimen: Blood Updated: 09/17/21 0806     Free T4 0.77 ng/dL     Narrative:      Results may be falsely increased if patient taking Biotin.      POC Glucose Once [[id number]]  (Abnormal) Collected: 09/17/21 0735    Specimen: Blood Updated: 09/17/21 0747     Glucose 166 mg/dL      Comment: : [id number] Roly PlasenciaMeter ID: [id number]       POC Glucose Once [[id number]]  (Abnormal) Collected: 09/17/21 0600    Specimen: Blood Updated: 09/17/21 0611     Glucose 163 mg/dL      Comment: : [id number] Shannon VanceahMeter ID: [id number]       SCANNED - LABS [[id number]] Resulted: 09/16/21     Updated: 09/17/21 0356    Comprehensive Metabolic Panel [[id number]]  (Abnormal) Collected: 09/17/21 0254    Specimen: Blood Updated: 09/17/21 0344     Glucose 132 mg/dL      BUN 27 mg/dL      Creatinine 3.43 mg/dL      Sodium 138 mmol/L      Potassium 4.4 mmol/L      Chloride 98 mmol/L      CO2 31.0 mmol/L      Calcium 8.6 mg/dL      Total Protein 6.2 g/dL      Albumin 3.30 g/dL      ALT (SGPT) 25 U/L      AST (SGOT) 27 U/L      Alkaline Phosphatase 144 U/L      Total Bilirubin 0.3 mg/dL      eGFR Non African Amer 14 mL/min/1.73       Comment: <15 Indicative of kidney failure.        eGFR   Amer --     Comment: <15 Indicative of kidney failure.        Globulin 2.9 gm/dL      A/G Ratio 1.1 g/dL      BUN/Creatinine Ratio 7.9     Anion Gap 9.0 mmol/L     Narrative:      GFR Normal >60  Chronic Kidney Disease <60  Kidney Failure <15      Lipid Panel [[id number]] Collected: 09/17/21 0254    Specimen: Blood Updated: 09/17/21 0344     Total Cholesterol 130 mg/dL      Triglycerides 43 mg/dL      HDL Cholesterol 60 mg/dL      LDL Cholesterol  60 mg/dL      VLDL Cholesterol 10 mg/dL      LDL/HDL Ratio 1.02    Narrative:      Cholesterol Reference Ranges  (U.S. Department of Health and Human Services ATP III Classifications)    Desirable          <200 mg/dL  Borderline High    200-239 mg/dL  High Risk          >240 mg/dL      Triglyceride Reference Ranges  (U.S. Department of Health and Human Services ATP III Classifications)    Normal           <150 mg/dL  Borderline High  150-199 mg/dL  High             200-499 mg/dL  Very High        >500 mg/dL    HDL Reference Ranges  (U.S. Department of Health and Human Services ATP III Classifcations)    Low     <40 mg/dl (major risk factor for CHD)  High    >60 mg/dl ('negative' risk factor for CHD)        LDL Reference Ranges  (U.S. Department of Health and Human Services ATP III Classifcations)    Optimal          <100 mg/dL  Near Optimal     100-129 mg/dL  Borderline High  130-159 mg/dL  High             160-189 mg/dL  Very High        >189 mg/dL    TSH [[id number]]  (Abnormal) Collected: 09/17/21 0254    Specimen: Blood Updated: 09/17/21 0344     TSH 20.330 uIU/mL     CBC & Differential [[id number]]  (Abnormal) Collected: 09/17/21 0254    Specimen: Blood Updated: 09/17/21 0310    Narrative:      The following orders were created for panel order CBC & Differential.  Procedure                               Abnormality         Status                     ---------                               -----------          ------                     CBC Auto Differential[[id number]]        Abnormal            Final result                 Please view results for these tests on the individual orders.    CBC Auto Differential [[id number]]  (Abnormal) Collected: 09/17/21 0254    Specimen: Blood Updated: 09/17/21 0310     WBC 5.10 10*3/mm3      RBC 2.38 10*6/mm3      Hemoglobin 7.0 g/dL      Hematocrit 23.1 %      MCV 97.1 fL      MCH 29.4 pg      MCHC 30.3 g/dL      RDW 15.9 %      RDW-SD 54.4 fl      MPV 9.9 fL      Platelets 160 10*3/mm3      Neutrophil % 61.8 %      Lymphocyte % 24.5 %      Monocyte % 8.8 %      Eosinophil % 2.9 %      Basophil % 1.6 %      Immature Grans % 0.4 %      Neutrophils, Absolute 3.15 10*3/mm3      Lymphocytes, Absolute 1.25 10*3/mm3      Monocytes, Absolute 0.45 10*3/mm3      Eosinophils, Absolute 0.15 10*3/mm3      Basophils, Absolute 0.08 10*3/mm3      Immature Grans, Absolute 0.02 10*3/mm3      nRBC 0.0 /100 WBC     POC Glucose Once [[id number]]  (Normal) Collected: 09/16/21 2024    Specimen: Blood Updated: 09/16/21 2038     Glucose 99 mg/dL      Comment: : [id number] Shannon VanceahMeter ID: [id number]       POC Glucose Once [[id number]]  (Normal) Collected: 09/16/21 1712    Specimen: Blood Updated: 09/16/21 1723     Glucose 100 mg/dL      Comment: : [id number] Nhan AlexanderelleMeter ID: [id number]       POC Glucose Once [[id number]]  (Abnormal) Collected: 09/16/21 1124    Specimen: Blood Updated: 09/16/21 1135     Glucose 518 mg/dL      Comment: : [id number] Chinmayon DanielleMeter ID: [id number]       Basic Metabolic Panel [[id number]]  (Abnormal) Collected: 09/16/21 1008    Specimen: Blood Updated: 09/16/21 1118     Glucose 550 mg/dL      BUN 45 mg/dL      Creatinine 4.52 mg/dL      Sodium 131 mmol/L      Potassium 5.2 mmol/L      Chloride 90 mmol/L      CO2 26.0 mmol/L      Calcium 8.5 mg/dL      eGFR   Amer --     Comment: <15 Indicative of kidney failure.        eGFR Non  Amer 10  mL/min/1.73      Comment: <15 Indicative of kidney failure.        BUN/Creatinine Ratio 10.0     Anion Gap 15.0 mmol/L     Narrative:      GFR Normal >60  Chronic Kidney Disease <60  Kidney Failure <15      POC Glucose Once [[id number]]  (Abnormal) Collected: 09/16/21 1049    Specimen: Blood Updated: 09/16/21 1110     Glucose 535 mg/dL      Comment: : [id number] Adkinson DanielleMeter ID: [id number]       POC Glucose Once [[id number]]  (Abnormal) Collected: 09/16/21 1030    Specimen: Blood Updated: 09/16/21 1045     Glucose 541 mg/dL      Comment: : [id number] Adkinson DanielleMeter ID: [id number]       CBC & Differential [[id number]]  (Abnormal) Collected: 09/16/21 1008    Specimen: Blood Updated: 09/16/21 1036    Narrative:      The following orders were created for panel order CBC & Differential.  Procedure                               Abnormality         Status                     ---------                               -----------         ------                     CBC Auto Differential[[id number]]        Abnormal            Final result                 Please view results for these tests on the individual orders.    CBC Auto Differential [[id number]]  (Abnormal) Collected: 09/16/21 1008    Specimen: Blood Updated: 09/16/21 1036     WBC 7.53 10*3/mm3      RBC 2.47 10*6/mm3      Hemoglobin 7.3 g/dL      Hematocrit 23.8 %      MCV 96.4 fL      MCH 29.6 pg      MCHC 30.7 g/dL      RDW 15.7 %      RDW-SD 54.5 fl      MPV 9.8 fL      Platelets 162 10*3/mm3      Neutrophil % 73.4 %      Lymphocyte % 14.7 %      Monocyte % 10.1 %      Eosinophil % 0.3 %      Basophil % 1.1 %      Immature Grans % 0.4 %      Neutrophils, Absolute 5.53 10*3/mm3      Lymphocytes, Absolute 1.11 10*3/mm3      Monocytes, Absolute 0.76 10*3/mm3      Eosinophils, Absolute 0.02 10*3/mm3      Basophils, Absolute 0.08 10*3/mm3      Immature Grans, Absolute 0.03 10*3/mm3      nRBC 0.0 /100 WBC     [place] Draw [[id number]] Collected: 09/16/21  0435    Specimen: Blood Updated: 09/16/21 0845    Narrative:      The following orders were created for panel order [place] Draw.  Procedure                               Abnormality         Status                     ---------                               -----------         ------                     Green Top (Gel)[[id number]]                                  Final result               Lavender Top[[id number]]                                     Final result               Red Top[[id number]]                                          Final result               [place] Blood Culture Haider...[[id number]]                      Final result               Gray Top[[id number]]                                         Final result               Light Blue Top[[id number]]                                   Final result                 Please view results for these tests on the individual orders.    [place] Top [[id number]] Collected: 09/16/21 0435    Specimen: Blood Updated: 09/16/21 0845     Extra Tube Hold for add-ons.     Comment: Auto resulted.       [place] Blood Culture Bottle Set [[id number]] Collected: 09/16/21 0435    Specimen: Blood from Arm, Right Updated: 09/16/21 0545     Extra Tube Hold for add-ons.     Comment: Auto resulted.       Green Top (Gel) [[id number]] Collected: 09/16/21 0435    Specimen: Blood Updated: 09/16/21 0545     Extra Tube Hold for add-ons.     Comment: Auto resulted.       Red Top [[id number]] Collected: 09/16/21 0435    Specimen: Blood Updated: 09/16/21 0545     Extra Tube Hold for add-ons.     Comment: Auto resulted.       Light Blue Top [[id number]] Collected: 09/16/21 0435    Specimen: Blood Updated: 09/16/21 0545     Extra Tube hold for add-on     Comment: Auto resulted       Lavender Top [[id number]] Collected: 09/16/21 0435    Specimen: Blood Updated: 09/16/21 0545     Extra Tube hold for add-on     Comment: Auto resulted       COVID PRE-OP / PRE-PROCEDURE SCREENING ORDER (NO ISOLATION) - Swab, Nasal  Cavity [[id number]]  (Normal) Collected: 09/16/21 0446    Specimen: Swab from Nasal Cavity Updated: 09/16/21 0535    Narrative:      The following orders were created for panel order COVID PRE-OP / PRE-PROCEDURE SCREENING ORDER (NO ISOLATION) - Swab, Nasal Cavity.  Procedure                               Abnormality         Status                     ---------                               -----------         ------                     COVID-19,[facility] IN-[facility]...[[id number]]  Normal              Final result                 Please view results for these tests on the individual orders.    COVID-19,[facility] IN-HOUSE,Nasal Swab No Transport Media 3-4 HR TAT - Swab, Nasal Cavity [[id number]]  (Normal) Collected: 09/16/21 0446    Specimen: Swab from Nasal Cavity Updated: 09/16/21 0535     COVID19 Not Detected    Narrative:      Fact sheet for providers: https://www.fda.gov/media/154922/download     Fact sheet for patients: https://www.fda.gov/media/657658/download    Test performed by PCR.    Consider negative results in combination with clinical observations, patient history, and epidemiological information.    BNP [[id number]]  (Abnormal) Collected: 09/16/21 0435    Specimen: Blood Updated: 09/16/21 0523     proBNP 54,505.0 pg/mL     Narrative:      Among patients with dyspnea, NT-proBNP is highly sensitive for the detection of acute congestive heart failure. In addition NT-proBNP of <300 pg/ml effectively rules out acute congestive heart failure with 99% negative predictive value.    Results may be falsely decreased if patient taking Biotin.      Troponin [[id number]]  (Abnormal) Collected: 09/16/21 0435    Specimen: Blood Updated: 09/16/21 0521     Troponin T 0.188 ng/mL     Narrative:      Troponin T Reference Range:  <= 0.03 ng/mL-   Negative for AMI  >0.03 ng/mL-     Abnormal for myocardial necrosis.  Clinicians would have to utilize clinical acumen, EKG, Troponin and serial changes to determine if it is an Acute  Myocardial Infarction or myocardial injury due to an underlying chronic condition.       Results may be falsely decreased if patient taking Biotin.      Comprehensive Metabolic Panel [[id number]]  (Abnormal) Collected: 09/16/21 0435    Specimen: Blood Updated: 09/16/21 0521     Glucose 566 mg/dL      BUN 41 mg/dL      Creatinine 4.21 mg/dL      Sodium 131 mmol/L      Potassium 5.1 mmol/L      Chloride 89 mmol/L      CO2 24.0 mmol/L      Calcium 8.8 mg/dL      Total Protein 7.8 g/dL      Albumin 4.10 g/dL      ALT (SGPT) 40 U/L      AST (SGOT) 81 U/L      Alkaline Phosphatase 205 U/L      Total Bilirubin 0.6 mg/dL      eGFR Non African Amer 11 mL/min/1.73      Comment: <15 Indicative of kidney failure.        eGFR   Amer --     Comment: <15 Indicative of kidney failure.        Globulin 3.7 gm/dL      A/G Ratio 1.1 g/dL      BUN/Creatinine Ratio 9.7     Anion Gap 18.0 mmol/L     Narrative:      GFR Normal >60  Chronic Kidney Disease <60  Kidney Failure <15      Magnesium [[id number]]  (Abnormal) Collected: 09/16/21 0435    Specimen: Blood Updated: 09/16/21 0501     Magnesium 2.7 mg/dL     aPTT [[id number]]  (Normal) Collected: 09/16/21 0435    Specimen: Blood Updated: 09/16/21 0452     PTT 33.1 seconds     Protime-INR [[id number]]  (Abnormal) Collected: 09/16/21 0435    Specimen: Blood Updated: 09/16/21 0452     Protime 15.4 Seconds      INR 1.27    CBC & Differential [[id number]]  (Abnormal) Collected: 09/16/21 0435    Specimen: Blood Updated: 09/16/21 0444    Narrative:      The following orders were created for panel order CBC & Differential.  Procedure                               Abnormality         Status                     ---------                               -----------         ------                     CBC Auto Differential[[id number]]        Abnormal            Final result                 Please view results for these tests on the individual orders.    CBC Auto Differential [[id number]]  (Abnormal)  Collected: 09/16/21 0435    Specimen: Blood Updated: 09/16/21 0444     WBC 7.78 10*3/mm3      RBC 2.86 10*6/mm3      Hemoglobin 8.7 g/dL      Hematocrit 26.9 %      MCV 94.1 fL      MCH 30.4 pg      MCHC 32.3 g/dL      RDW 15.6 %      RDW-SD 54.0 fl      MPV 9.5 fL      Platelets 191 10*3/mm3      Neutrophil % 70.8 %      Lymphocyte % 20.1 %      Monocyte % 6.4 %      Eosinophil % 1.3 %      Basophil % 1.0 %      Immature Grans % 0.4 %      Neutrophils, Absolute 5.51 10*3/mm3      Lymphocytes, Absolute 1.56 10*3/mm3      Monocytes, Absolute 0.50 10*3/mm3      Eosinophils, Absolute 0.10 10*3/mm3      Basophils, Absolute 0.08 10*3/mm3      Immature Grans, Absolute 0.03 10*3/mm3      nRBC 0.0 /100 WBC     Blood Gas, Arterial With Co-Ox [[id number]]  (Abnormal) Collected: 09/16/21 0435    Specimen: Arterial Blood Updated: 09/16/21 0443     Site Left Radial     Aaron's Test N/A     pH, Arterial 7.385 pH units      pCO2, Arterial 45.1 mm Hg      Comment: 83 Value above reference range        pO2, Arterial 113.0 mm Hg      Comment: 83 Value above reference range        HCO3, Arterial 27.0 mmol/L      Comment: 83 Value above reference range        Base Excess, Arterial 1.7 mmol/L      O2 Saturation, Arterial 99.1 %      Comment: 83 Value above reference range        Hemoglobin, Blood Gas 8.7 g/dL      Comment: 84 Value below reference range        Hematocrit, Blood Gas 26.7 %      Comment: 84 Value below reference range        Oxyhemoglobin 94.2 %      Methemoglobin 1.00 %      Carboxyhemoglobin 4.0 %      A-a Gradiant 544.6 mmHg      Temperature 37.0 C      Sodium, Arterial 134 mmol/L      Comment: 84 Value below reference range        Potassium, Arterial 4.9 mmol/L      Barometric Pressure for Blood Gas 750 mmHg      Modality BiPap     FIO2 100 %      Ventilator Mode NA     IPAP 16     Comment: Meter: [id number]     :  [id number]        EPAP 8     Note --     Collected by [id number]     pH, Temp Corrected 7.385 pH  Units      pCO2, Temperature Corrected 45.1 mm Hg      pO2, Temperature Corrected 113 mm Hg         Imaging Results (All)     Procedure Component Value Units Date/Time    XR Chest 1 View [[id number]] Collected: 09/16/21 0742     Updated: 09/16/21 0747    Narrative:      Frontal supine radiograph of the chest 9/16/2021 4:42 AM CDT     History: soa     Comparison: Chest x-ray dated 9/11/2021.      Findings:   Right IJ catheter has been removed. Subclavian stent on the left and  that along the left humerus.. Mild to moderate cardiomegaly.  Interstitial edema.. No measurable pneumothorax. Trace pleural effusions  suspected..       No acute osseous or soft tissue abnormality is noted.        Impression:      Impression:   1.   Findings of fluid overload..        This report was finalized on 09/16/2021 07:44 by Dr. Anabela Washington MD.    XR Chest 1 View [[id number]] Collected: 09/16/21 0738     Updated: 09/16/21 0744    Narrative:      Frontal supine radiograph of the chest 9/16/2021 5:53 AM CDT     History: CENTRAL LINE PLACEMENT; I16.1-Hypertensive emergency;  J81.0-Acute pulmonary edema; N18.9-Chronic kidney disease, unspecified;  R73.9-Hyperglycemia, unspecified; J96.01-Acute respiratory failure with  hypoxia     Comparison: Chest x-ray dated 9/16/2021      Findings:   There is a new right IJ catheters which terminates in the right atrium.  Left subclavian stent is redemonstrated. Partial visualization of the  vascular stent along the left humerus.. Bilateral central parenchymal  opacities in the left lower lung base opacity are grossly similar..  Moderate cardiomegaly..       No acute osseous or soft tissue abnormality is noted.        Impression:      Impression:   1.   Findings remain concerning for fluid overload.  2.  New right IJ catheter, without measurable pneumothorax.        This report was finalized on 09/16/2021 07:41 by Dr. Anabela Washington MD.        History of Present Illness on Day of Discharge: Patient  states she is feeling much better and requests discharge home.    Hospital Course:  Ms. Upton is a 51-year-old  female who follows [facility] for primary care.  She has a medical history significant for end-stage renal disease on hemodialysis, hypertension, hypothyroidism, and poor medical compliance.  The patient presented to the [facility] emergency department on 9/16/2021 with complaints of shortness of breath.  Her oxygen saturation was 85% and she was placed on BiPAP in the emergency room.  Noted she was recently admitted to our facility from 9/10 through 9/13 found with decreased responsiveness at home.  She was found to be hypoglycemic at that time.  Chest x-ray showed concerns for fluid overload.  Elevated troponin and BNP levels noted, felt likely secondary to chronic renal failure and fluid overload.  EKG showed no acute ST-T wave changes.  The patient was also hypertensive on admission with blood pressure of 239/125.  She was placed on a nitroglycerin drip for a short period of time in the emergency department, then transfer to the intensive care unit on a Cardene drip.  She was admitted to the hospitalist service for further evaluation and management.    The patient was felt to have missed her dialysis treatment on 9/14, leading to her symptoms at presentation.  She did receive a dialysis treatment on the day of admission with improvement of her symptoms.  She was able to be weaned quickly from oxygen.  She also had dialysis today without event.    She was able to be weaned from Cardene drip.  Several of her home medication dosages were adjusted including Coreg, clonidine, hydralazine, and lisinopril.  The patient insists that she is compliant with her medication regimen however upon review from medication reconciliation technician in the pharmacy it was noted many of her medications had not been filled since June or July.  Also noted that the patient's TSH is under  "suppressed with low free T4.  She again indicates that she is compliant with her medication.  Her Synthroid dosage will be increased from 25 mcg to 50 mcg.  This will need to be rechecked on an outpatient basis by her primary care provider in the next 6 weeks.    The patient was hyperglycemic on admission with blood glucoses greater than 500.  These have much improved resuming her home insulin regimen.  Her blood glucose this afternoon was in the 30s which improved with oral treatment.  Likely labile blood glucoses secondary to noncompliance at home.  Her hemoglobin A1c is 11.4%.    Overall, the patient is hemodynamically stable and appropriate for discharge home today.  She will need to follow-up with her primary care provider in 1 week.    Condition on Discharge:  Medically stable    Physical Exam on Discharge:  BP (!) 184/75 (BP Location: Right arm, Patient Position: Lying)   Pulse 67   Temp 98.1 °F (36.7 °C) (Oral)   Resp 16   Ht 160 cm (62.99\")   Wt 76.7 kg (169 lb)   LMP  (LMP Unknown)   SpO2 94%   BMI 29.94 kg/m²   Physical Exam  Vitals and nursing note reviewed.   Constitutional:       General: She is not in acute distress.     Appearance: She is ill-appearing (chronically).   HENT:      Head: Normocephalic and atraumatic.   Cardiovascular:      Rate and Rhythm: Normal rate and regular rhythm.      Heart sounds: Murmur heard.     Sinus 61-74     Comments: Right internal jugular central line and right arterial line have been removed, dressings in place.   Pulmonary:      Effort: Pulmonary effort is normal.      Breath sounds: Rales (bibasilar) present. No wheezing or rhonchi.   Abdominal:      General: Bowel sounds are normal. There is no distension.      Palpations: Abdomen is soft.      Tenderness: There is no abdominal tenderness.   Musculoskeletal:         General: No swelling or tenderness. Normal range of motion.      Cervical back: Normal range of motion and neck supple. No tenderness. "   Skin:     General: Skin is warm and dry.      Coloration: Skin is pale.   Neurological:      General: No focal deficit present.      Mental Status: She is alert and oriented to person, place, and time.   Psychiatric:         Mood and Affect: Mood normal.         Behavior: Behavior normal.         Thought Content: Thought content normal.         Judgment: Judgment normal.     Discharge Disposition:  Home or Self Care    Discharge Medications:     Discharge Medications      Changes to Medications      Instructions Start Date   carvedilol 25 MG tablet  Commonly known as: COREG  What changed:   · medication strength  · how much to take   25 mg, Oral, 2 Times Daily With Meals      cloNIDine 0.3 MG tablet  Commonly known as: CATAPRES  What changed:   · medication strength  · how much to take   0.3 mg, Oral, Every 8 Hours Scheduled      hydrALAZINE 50 MG tablet  Commonly known as: APRESOLINE  What changed: how much to take   75 mg, Oral, 3 Times Daily      levothyroxine 50 MCG tablet  Commonly known as: SYNTHROID, LEVOTHROID  What changed:   · medication strength  · how much to take   50 mcg, Oral, Daily      lisinopril 40 MG tablet  Commonly known as: PRINIVILZESTRIL  What changed:   · medication strength  · how much to take  · when to take this   40 mg, Oral, Daily         Continue These Medications      Instructions Start Date   albuterol sulfate  (90 Base) MCG/ACT inhaler  Commonly known as: PROVENTIL HFA;VENTOLIN HFA;PROAIR HFA   2 puffs, Inhalation, Every 4 Hours PRN      BASAGLAR KWIKPEN 100 UNIT/ML injection pen   40 Units, Subcutaneous, Nightly      DULoxetine 60 MG capsule  Commonly known as: CYMBALTA   60 mg, Oral, Daily      insulin aspart 100 UNIT/ML injection  Commonly known as: novoLOG   10 Units, Subcutaneous, 3 Times Daily Before Meals      lactulose 10 GM/15ML solution  Commonly known as: CHRONULAC   20 g, Oral, 3 Times Daily      NIFEdipine XL 60 MG 24 hr tablet  Commonly known as: PROCARDIA  XL   60 mg, Oral, 2 times daily      ondansetron 4 MG tablet  Commonly known as: ZOFRAN   4 mg, Oral, Every 8 Hours PRN      pregabalin 75 MG capsule  Commonly known as: LYRICA   75 mg, Oral, 2 Times Daily      rOPINIRole 4 MG tablet  Commonly known as: REQUIP   4 mg, Oral, Nightly      simvastatin 20 MG tablet  Commonly known as: ZOCOR   40 mg, Oral, Daily           Discharge Diet:   Diet Instructions     Diet: Consistent Carbohydrate, Cardiac, Renal; Thin      Discharge Diet:  Consistent Carbohydrate  Cardiac  Renal       Fluid Consistency: Thin        Activity at Discharge:   Activity Instructions     Activity as Tolerated      Measure Blood Pressure      Measure blood pressure twice daily. Record these measurements to discuss with PCP at follow-up        Discharge Care Plan/Instructions:   1.  Return for any acute or worsening symptoms.  2.  Importance of compliance with medication regimen and hemodialysis discussed.  3.  Numerous medication adjustments as above.  Blood pressure medications have been adjusted and patient is instructed to monitor her blood pressure readings at home.  4.  Synthroid dosage increased.  Will need repeat thyroid function testing as an outpatient in the next 6 weeks.    Follow-up Appointments:   1.  Primary care provider in 1 week.  2.  Nephrology in 1 to 2 weeks.    Test Results Pending at Discharge: We will follow blood cultures to completion.  No growth to date.    Electronically signed by GAEL Pizarro, 9/18/2021, 14:35 CDT.    Time: 40 minutes

## 2021-09-19 NOTE — PAYOR COMM NOTE
"IN HOME 9-18-21  [id number]    Chemo Upton (51 y.o. Female)     Date of Birth Social Security Number Address Home Phone MRN    [id number]  [id number] [facility] [address] [phone] [id number]    Judaism Marital Status          Other        Admission Date Admission Type Admitting Provider Attending Provider Department, Room/Bed    9/16/21 Emergency Narinder Madrid MD  [facility] 4B, 434/1    Discharge Date Discharge Disposition Discharge Destination        9/18/2021 Home or Self Care              Attending Provider: (none)   Allergies: Penicillins, Lamisil [Terbinafine], Reglan [Metoclopramide], Stadol [Butorphanol]    Isolation: None   Infection: None   Code Status: Prior    Ht: 160 cm (62.99\")   Wt: 76.7 kg (169 lb)    Admission Cmt: None   Principal Problem: Hypertensive emergency [I16.1]                 Active Insurance as of 9/16/2021     Primary Coverage     Payor Plan Insurance Group Employer/Plan Group    [facility] MEDICARE REPLACEMENT [employer] MEDICARE REPLACEMENT      Payor Plan Address Payor Plan Phone Number Payor Plan Fax Number Effective Dates    [address] [address] [phone]  6/1/2021 - None Entered    [facility] [id number]       Subscriber Name Subscriber Birth Date Member ID       CHEMO UPTON 1970 [id number]           Secondary Coverage     Payor Plan Insurance Group Employer/Plan Group    [place]      Payor Plan Address Payor Plan Phone Number Payor Plan Fax Number Effective Dates    [address] [phone]  8/17/2021 - None Entered    [facility] [id number]       Subscriber Name Subscriber Birth Date Member ID       CHEMO UPTON 1970 [id number]                 Emergency Contacts      (Rel.) Home Phone Work Phone Mobile Phone    Kassandra Paez (Sister) [phone] -- [phone]    Dmitriy Galvez (Friend) -- -- [phone]               Discharge Summary      Alejandra Kruse, APRN at 09/18/21 1435     " Attestation signed by Narinder Madrid MD at 09/18/21 1820    I have reviewed this documentation and agree.    .I personally evaluated and examined the patient in conjunction with GAEL Sofia and agree with the assessment, treatment plan, and disposition of the patient as recorded by her. My history, exam, and further recommendations are: I have reviewed and agree with the plans.KT .      Electronically signed by Narinder Madrid MD, 9/18/2021, 18:20 CDT.                         [facility]  DISCHARGE SUMMARY       Date of Admission: 9/16/2021  Date of Discharge:  9/18/2021  Primary Care Physician: Yaron Wiggins APRN    Presenting Problem/History of Present Illness:  Shortness of breath     Final Discharge Diagnoses:  Active Hospital Problems    Diagnosis    • **Hypertensive emergency    • Acute pulmonary edema (CMS/HCC)    • Hypothyroidism    • Uncontrolled diabetes mellitus (CMS/HCC)    • End stage kidney disease (CMS/HCC)    • Seizure (CMS/HCC)    • Gastroparesis due to DM (CMS/HCC)    • Intractable nausea and vomiting      Consults:   1.  Nephrology    Procedures Performed: None    Pertinent Test Results:   Lab Results (all)     Procedure Component Value Units Date/Time    POC Glucose Once [[id number]]  (Abnormal) Collected: 09/18/21 1404    Specimen: Blood Updated: 09/18/21 1434     Glucose 61 mg/dL      Comment: : [id number] Sanergyeter ID: [id number]       POC Glucose Once [[id number]]  (Abnormal) Collected: 09/18/21 1345    Specimen: Blood Updated: 09/18/21 1357     Glucose 34 mg/dL      Comment: NOTEDOperator: [id number] Black[employer] AgencyyMeter ID: [id number]       POC Glucose Once [[id number]]  (Abnormal) Collected: 09/18/21 1338    Specimen: Blood Updated: 09/18/21 1357     Glucose 35 mg/dL      Comment: : [id number] Sanergyeter ID: [id number]       Blood Culture - Blood, Arm, Right [[id number]] Collected: 09/16/21 1008    Specimen: Blood  from Arm, Right Updated: 09/18/21 1045     Blood Culture No growth at 2 days    Blood Culture - Blood, Wrist, Right [[id number]] Collected: 09/16/21 1020    Specimen: Blood from Wrist, Right Updated: 09/18/21 1045     Blood Culture No growth at 2 days    Comprehensive Metabolic Panel [[id number]]  (Abnormal) Collected: 09/18/21 0650    Specimen: Blood Updated: 09/18/21 0752     Glucose 157 mg/dL      BUN 42 mg/dL      Creatinine 5.02 mg/dL      Sodium 134 mmol/L      Potassium 4.9 mmol/L      Chloride 93 mmol/L      CO2 28.0 mmol/L      Calcium 9.0 mg/dL      Total Protein 6.4 g/dL      Albumin 3.30 g/dL      ALT (SGPT) 21 U/L      AST (SGOT) 22 U/L      Alkaline Phosphatase 143 U/L      Total Bilirubin 0.3 mg/dL      eGFR Non African Amer 9 mL/min/1.73      Comment: <15 Indicative of kidney failure.        eGFR   Amer --     Comment: <15 Indicative of kidney failure.        Globulin 3.1 gm/dL      A/G Ratio 1.1 g/dL      BUN/Creatinine Ratio 8.4     Anion Gap 13.0 mmol/L     Narrative:      GFR Normal >60  Chronic Kidney Disease <60  Kidney Failure <15      CBC & Differential [[id number]]  (Abnormal) Collected: 09/18/21 0650    Specimen: Blood Updated: 09/18/21 0721    Narrative:      The following orders were created for panel order CBC & Differential.  Procedure                               Abnormality         Status                     ---------                               -----------         ------                     CBC Auto Differential[[id number]]        Abnormal            Final result                 Please view results for these tests on the individual orders.    CBC Auto Differential [[id number]]  (Abnormal) Collected: 09/18/21 0650    Specimen: Blood Updated: 09/18/21 0721     WBC 5.37 10*3/mm3      RBC 2.39 10*6/mm3      Hemoglobin 7.1 g/dL      Hematocrit 23.5 %      MCV 98.3 fL      MCH 29.7 pg      MCHC 30.2 g/dL      RDW 16.1 %      RDW-SD 57.1 fl      MPV 10.3 fL      Platelets 163 10*3/mm3       Neutrophil % 63.7 %      Lymphocyte % 19.4 %      Monocyte % 10.4 %      Eosinophil % 4.8 %      Basophil % 1.3 %      Immature Grans % 0.4 %      Neutrophils, Absolute 3.42 10*3/mm3      Lymphocytes, Absolute 1.04 10*3/mm3      Monocytes, Absolute 0.56 10*3/mm3      Eosinophils, Absolute 0.26 10*3/mm3      Basophils, Absolute 0.07 10*3/mm3      Immature Grans, Absolute 0.02 10*3/mm3      nRBC 0.0 /100 WBC     POC Glucose Once [[id number]]  (Abnormal) Collected: 09/17/21 2002    Specimen: Blood Updated: 09/17/21 2029     Glucose 255 mg/dL      Comment: : [id number] Blakemore DominiqueMeter ID: [id number]       POC Glucose Once [[id number]]  (Normal) Collected: 09/17/21 1612    Specimen: Blood Updated: 09/17/21 1625     Glucose 120 mg/dL      Comment: : [id number] Sriram (DeBoe) NemoMeter ID: [id number]       POC Glucose Once [[id number]]  (Abnormal) Collected: 09/17/21 1139    Specimen: Blood Updated: 09/17/21 1150     Glucose 155 mg/dL      Comment: : [id number] Roly PlasenciaMeter ID: [id number]       POC Glucose Once [[id number]]  (Abnormal) Collected: 09/17/21 0831    Specimen: Blood Updated: 09/17/21 0843     Glucose 152 mg/dL      Comment: : [id number] Roly PlasenciaMeter ID: [id number]       T4, Free [[id number]]  (Abnormal) Collected: 09/17/21 0254    Specimen: Blood Updated: 09/17/21 0806     Free T4 0.77 ng/dL     Narrative:      Results may be falsely increased if patient taking Biotin.      POC Glucose Once [[id number]]  (Abnormal) Collected: 09/17/21 0735    Specimen: Blood Updated: 09/17/21 0747     Glucose 166 mg/dL      Comment: : [id number] Roly PlasenciaMeter ID: [id number]       POC Glucose Once [[id number]]  (Abnormal) Collected: 09/17/21 0600    Specimen: Blood Updated: 09/17/21 0611     Glucose 163 mg/dL      Comment: : [id number] Shannon StockMeter ID: [id number]       [facility] - LABS [[id number]] Resulted: 09/16/21     Updated: 09/17/21 0356    Comprehensive Metabolic Panel  [[id number]]  (Abnormal) Collected: 09/17/21 0254    Specimen: Blood Updated: 09/17/21 0344     Glucose 132 mg/dL      BUN 27 mg/dL      Creatinine 3.43 mg/dL      Sodium 138 mmol/L      Potassium 4.4 mmol/L      Chloride 98 mmol/L      CO2 31.0 mmol/L      Calcium 8.6 mg/dL      Total Protein 6.2 g/dL      Albumin 3.30 g/dL      ALT (SGPT) 25 U/L      AST (SGOT) 27 U/L      Alkaline Phosphatase 144 U/L      Total Bilirubin 0.3 mg/dL      eGFR Non African Amer 14 mL/min/1.73      Comment: <15 Indicative of kidney failure.        eGFR   Amer --     Comment: <15 Indicative of kidney failure.        Globulin 2.9 gm/dL      A/G Ratio 1.1 g/dL      BUN/Creatinine Ratio 7.9     Anion Gap 9.0 mmol/L     Narrative:      GFR Normal >60  Chronic Kidney Disease <60  Kidney Failure <15      Lipid Panel [[id number]] Collected: 09/17/21 0254    Specimen: Blood Updated: 09/17/21 0344     Total Cholesterol 130 mg/dL      Triglycerides 43 mg/dL      HDL Cholesterol 60 mg/dL      LDL Cholesterol  60 mg/dL      VLDL Cholesterol 10 mg/dL      LDL/HDL Ratio 1.02    Narrative:      Cholesterol Reference Ranges  (U.S. Department of Health and Human Services ATP III Classifications)    Desirable          <200 mg/dL  Borderline High    200-239 mg/dL  High Risk          >240 mg/dL      Triglyceride Reference Ranges  (U.S. Department of Health and Human Services ATP III Classifications)    Normal           <150 mg/dL  Borderline High  150-199 mg/dL  High             200-499 mg/dL  Very High        >500 mg/dL    HDL Reference Ranges  (U.S. Department of Health and Human Services ATP III Classifcations)    Low     <40 mg/dl (major risk factor for CHD)  High    >60 mg/dl ('negative' risk factor for CHD)        LDL Reference Ranges  (U.S. Department of Health and Human Services ATP III Classifcations)    Optimal          <100 mg/dL  Near Optimal     100-129 mg/dL  Borderline High  130-159 mg/dL  High             160-189 mg/dL  Very High         >189 mg/dL    TSH [[id number]]  (Abnormal) Collected: 09/17/21 0254    Specimen: Blood Updated: 09/17/21 0344     TSH 20.330 uIU/mL     CBC & Differential [[id number]]  (Abnormal) Collected: 09/17/21 0254    Specimen: Blood Updated: 09/17/21 0310    Narrative:      The following orders were created for panel order CBC & Differential.  Procedure                               Abnormality         Status                     ---------                               -----------         ------                     CBC Auto Differential[[id number]]        Abnormal            Final result                 Please view results for these tests on the individual orders.    CBC Auto Differential [[id number]]  (Abnormal) Collected: 09/17/21 0254    Specimen: Blood Updated: 09/17/21 0310     WBC 5.10 10*3/mm3      RBC 2.38 10*6/mm3      Hemoglobin 7.0 g/dL      Hematocrit 23.1 %      MCV 97.1 fL      MCH 29.4 pg      MCHC 30.3 g/dL      RDW 15.9 %      RDW-SD 54.4 fl      MPV 9.9 fL      Platelets 160 10*3/mm3      Neutrophil % 61.8 %      Lymphocyte % 24.5 %      Monocyte % 8.8 %      Eosinophil % 2.9 %      Basophil % 1.6 %      Immature Grans % 0.4 %      Neutrophils, Absolute 3.15 10*3/mm3      Lymphocytes, Absolute 1.25 10*3/mm3      Monocytes, Absolute 0.45 10*3/mm3      Eosinophils, Absolute 0.15 10*3/mm3      Basophils, Absolute 0.08 10*3/mm3      Immature Grans, Absolute 0.02 10*3/mm3      nRBC 0.0 /100 WBC     POC Glucose Once [[id number]]  (Normal) Collected: 09/16/21 2024    Specimen: Blood Updated: 09/16/21 2038     Glucose 99 mg/dL      Comment: : [id number] Shannon VanceahMeter ID: [id number]       POC Glucose Once [[id number]]  (Normal) Collected: 09/16/21 1712    Specimen: Blood Updated: 09/16/21 1723     Glucose 100 mg/dL      Comment: : [id number] Nhan AlexanderzainaMeter ID: [id number]       POC Glucose Once [[id number]]  (Abnormal) Collected: 09/16/21 1124    Specimen: Blood Updated: 09/16/21 1135     Glucose 518  mg/dL      Comment: : [id number] AdGIROPTICon DanielleMeter ID: [id number]       Basic Metabolic Panel [[id number]]  (Abnormal) Collected: 09/16/21 1008    Specimen: Blood Updated: 09/16/21 1118     Glucose 550 mg/dL      BUN 45 mg/dL      Creatinine 4.52 mg/dL      Sodium 131 mmol/L      Potassium 5.2 mmol/L      Chloride 90 mmol/L      CO2 26.0 mmol/L      Calcium 8.5 mg/dL      eGFR   Amer --     Comment: <15 Indicative of kidney failure.        eGFR Non African Amer 10 mL/min/1.73      Comment: <15 Indicative of kidney failure.        BUN/Creatinine Ratio 10.0     Anion Gap 15.0 mmol/L     Narrative:      GFR Normal >60  Chronic Kidney Disease <60  Kidney Failure <15      POC Glucose Once [[id number]]  (Abnormal) Collected: 09/16/21 1049    Specimen: Blood Updated: 09/16/21 1110     Glucose 535 mg/dL      Comment: : [id number] Arbor Plastic Technologieskinson DanielleMeter ID: [id number]       POC Glucose Once [[id number]]  (Abnormal) Collected: 09/16/21 1030    Specimen: Blood Updated: 09/16/21 1045     Glucose 541 mg/dL      Comment: : [id number] Arbor Plastic Technologieskinson DanielleMeter ID: [id number]       CBC & Differential [[id number]]  (Abnormal) Collected: 09/16/21 1008    Specimen: Blood Updated: 09/16/21 1036    Narrative:      The following orders were created for panel order CBC & Differential.  Procedure                               Abnormality         Status                     ---------                               -----------         ------                     CBC Auto Differential[[id number]]        Abnormal            Final result                 Please view results for these tests on the individual orders.    CBC Auto Differential [[id number]]  (Abnormal) Collected: 09/16/21 1008    Specimen: Blood Updated: 09/16/21 1036     WBC 7.53 10*3/mm3      RBC 2.47 10*6/mm3      Hemoglobin 7.3 g/dL      Hematocrit 23.8 %      MCV 96.4 fL      MCH 29.6 pg      MCHC 30.7 g/dL      RDW 15.7 %      RDW-SD 54.5 fl      MPV 9.8 fL       Platelets 162 10*3/mm3      Neutrophil % 73.4 %      Lymphocyte % 14.7 %      Monocyte % 10.1 %      Eosinophil % 0.3 %      Basophil % 1.1 %      Immature Grans % 0.4 %      Neutrophils, Absolute 5.53 10*3/mm3      Lymphocytes, Absolute 1.11 10*3/mm3      Monocytes, Absolute 0.76 10*3/mm3      Eosinophils, Absolute 0.02 10*3/mm3      Basophils, Absolute 0.08 10*3/mm3      Immature Grans, Absolute 0.03 10*3/mm3      nRBC 0.0 /100 WBC     Katy Draw [[id number]] Collected: 09/16/21 0435    Specimen: Blood Updated: 09/16/21 0845    Narrative:      The following orders were created for panel order Katy Draw.  Procedure                               Abnormality         Status                     ---------                               -----------         ------                     Green Top (Gel)[[id number]]                                  Final result               Lavender Top[[id number]]                                     Final result               Red Top[[id number]]                                          Final result               Katy Blood Culture Haider...[[id number]]                      Final result               Gray Top[[id number]]                                         Final result               Light Blue Top[[id number]]                                   Final result                 Please view results for these tests on the individual orders.    Brunson Top [[id number]] Collected: 09/16/21 0435    Specimen: Blood Updated: 09/16/21 0845     Extra Tube Hold for add-ons.     Comment: Auto resulted.       Katy Blood Culture Bottle Set [[id number]] Collected: 09/16/21 0435    Specimen: Blood from Arm, Right Updated: 09/16/21 0545     Extra Tube Hold for add-ons.     Comment: Auto resulted.       Green Top (Gel) [[id number]] Collected: 09/16/21 0435    Specimen: Blood Updated: 09/16/21 0545     Extra Tube Hold for add-ons.     Comment: Auto resulted.       Red Top [[id number]] Collected: 09/16/21 0435     Specimen: Blood Updated: 09/16/21 0545     Extra Tube Hold for add-ons.     Comment: Auto resulted.       Light Blue Top [[id number]] Collected: 09/16/21 0435    Specimen: Blood Updated: 09/16/21 0545     Extra Tube hold for add-on     Comment: Auto resulted       Lavender Top [[id number]] Collected: 09/16/21 0435    Specimen: Blood Updated: 09/16/21 0545     Extra Tube hold for add-on     Comment: Auto resulted       COVID PRE-OP / PRE-PROCEDURE SCREENING ORDER (NO ISOLATION) - Swab, Nasal Cavity [[id number]]  (Normal) Collected: 09/16/21 0446    Specimen: Swab from Nasal Cavity Updated: 09/16/21 0535    Narrative:      The following orders were created for panel order COVID PRE-OP / PRE-PROCEDURE SCREENING ORDER (NO ISOLATION) - Swab, Nasal Cavity.  Procedure                               Abnormality         Status                     ---------                               -----------         ------                     COVID-19,[facility] IN-RONNIE...[[id number]]  Normal              Final result                 Please view results for these tests on the individual orders.    COVID-19,[facility] IN-HOUSE,Nasal Swab No Transport Media 3-4 HR TAT - Swab, Nasal Cavity [[id number]]  (Normal) Collected: 09/16/21 0446    Specimen: Swab from Nasal Cavity Updated: 09/16/21 0535     COVID19 Not Detected    Narrative:      Fact sheet for providers: https://www.fda.gov/media/088157/download     Fact sheet for patients: https://www.fda.gov/media/224669/download    Test performed by PCR.    Consider negative results in combination with clinical observations, patient history, and epidemiological information.    BNP [[id number]]  (Abnormal) Collected: 09/16/21 0435    Specimen: Blood Updated: 09/16/21 0523     proBNP 54,505.0 pg/mL     Narrative:      Among patients with dyspnea, NT-proBNP is highly sensitive for the detection of acute congestive heart failure. In addition NT-proBNP of <300 pg/ml effectively rules out acute congestive heart  failure with 99% negative predictive value.    Results may be falsely decreased if patient taking Biotin.      Troponin [[id number]]  (Abnormal) Collected: 09/16/21 0435    Specimen: Blood Updated: 09/16/21 0521     Troponin T 0.188 ng/mL     Narrative:      Troponin T Reference Range:  <= 0.03 ng/mL-   Negative for AMI  >0.03 ng/mL-     Abnormal for myocardial necrosis.  Clinicians would have to utilize clinical acumen, EKG, Troponin and serial changes to determine if it is an Acute Myocardial Infarction or myocardial injury due to an underlying chronic condition.       Results may be falsely decreased if patient taking Biotin.      Comprehensive Metabolic Panel [[id number]]  (Abnormal) Collected: 09/16/21 0435    Specimen: Blood Updated: 09/16/21 0521     Glucose 566 mg/dL      BUN 41 mg/dL      Creatinine 4.21 mg/dL      Sodium 131 mmol/L      Potassium 5.1 mmol/L      Chloride 89 mmol/L      CO2 24.0 mmol/L      Calcium 8.8 mg/dL      Total Protein 7.8 g/dL      Albumin 4.10 g/dL      ALT (SGPT) 40 U/L      AST (SGOT) 81 U/L      Alkaline Phosphatase 205 U/L      Total Bilirubin 0.6 mg/dL      eGFR Non African Amer 11 mL/min/1.73      Comment: <15 Indicative of kidney failure.        eGFR   Amer --     Comment: <15 Indicative of kidney failure.        Globulin 3.7 gm/dL      A/G Ratio 1.1 g/dL      BUN/Creatinine Ratio 9.7     Anion Gap 18.0 mmol/L     Narrative:      GFR Normal >60  Chronic Kidney Disease <60  Kidney Failure <15      Magnesium [[id number]]  (Abnormal) Collected: 09/16/21 0435    Specimen: Blood Updated: 09/16/21 0501     Magnesium 2.7 mg/dL     aPTT [[id number]]  (Normal) Collected: 09/16/21 0435    Specimen: Blood Updated: 09/16/21 0452     PTT 33.1 seconds     Protime-INR [[id number]]  (Abnormal) Collected: 09/16/21 0435    Specimen: Blood Updated: 09/16/21 0452     Protime 15.4 Seconds      INR 1.27    CBC & Differential [[id number]]  (Abnormal) Collected: 09/16/21 0435    Specimen: Blood  Updated: 09/16/21 0444    Narrative:      The following orders were created for panel order CBC & Differential.  Procedure                               Abnormality         Status                     ---------                               -----------         ------                     CBC Auto Differential[[id number]]        Abnormal            Final result                 Please view results for these tests on the individual orders.    CBC Auto Differential [[id number]]  (Abnormal) Collected: 09/16/21 0435    Specimen: Blood Updated: 09/16/21 0444     WBC 7.78 10*3/mm3      RBC 2.86 10*6/mm3      Hemoglobin 8.7 g/dL      Hematocrit 26.9 %      MCV 94.1 fL      MCH 30.4 pg      MCHC 32.3 g/dL      RDW 15.6 %      RDW-SD 54.0 fl      MPV 9.5 fL      Platelets 191 10*3/mm3      Neutrophil % 70.8 %      Lymphocyte % 20.1 %      Monocyte % 6.4 %      Eosinophil % 1.3 %      Basophil % 1.0 %      Immature Grans % 0.4 %      Neutrophils, Absolute 5.51 10*3/mm3      Lymphocytes, Absolute 1.56 10*3/mm3      Monocytes, Absolute 0.50 10*3/mm3      Eosinophils, Absolute 0.10 10*3/mm3      Basophils, Absolute 0.08 10*3/mm3      Immature Grans, Absolute 0.03 10*3/mm3      nRBC 0.0 /100 WBC     Blood Gas, Arterial With Co-Ox [[id number]]  (Abnormal) Collected: 09/16/21 0435    Specimen: Arterial Blood Updated: 09/16/21 0443     Site Left Radial     Aaron's Test N/A     pH, Arterial 7.385 pH units      pCO2, Arterial 45.1 mm Hg      Comment: 83 Value above reference range        pO2, Arterial 113.0 mm Hg      Comment: 83 Value above reference range        HCO3, Arterial 27.0 mmol/L      Comment: 83 Value above reference range        Base Excess, Arterial 1.7 mmol/L      O2 Saturation, Arterial 99.1 %      Comment: 83 Value above reference range        Hemoglobin, Blood Gas 8.7 g/dL      Comment: 84 Value below reference range        Hematocrit, Blood Gas 26.7 %      Comment: 84 Value below reference range        Oxyhemoglobin 94.2 %       Methemoglobin 1.00 %      Carboxyhemoglobin 4.0 %      A-a Gradiant 544.6 mmHg      Temperature 37.0 C      Sodium, Arterial 134 mmol/L      Comment: 84 Value below reference range        Potassium, Arterial 4.9 mmol/L      Barometric Pressure for Blood Gas 750 mmHg      Modality BiPap     FIO2 100 %      Ventilator Mode NA     IPAP 16     Comment: Meter: [id number]     :  [id number]        EPAP 8     Note --     Collected by [id number]     pH, Temp Corrected 7.385 pH Units      pCO2, Temperature Corrected 45.1 mm Hg      pO2, Temperature Corrected 113 mm Hg         Imaging Results (All)     Procedure Component Value Units Date/Time    XR Chest 1 View [[id number]] Collected: 09/16/21 0742     Updated: 09/16/21 0747    Narrative:      Frontal supine radiograph of the chest 9/16/2021 4:42 AM CDT     History: soa     Comparison: Chest x-ray dated 9/11/2021.      Findings:   Right IJ catheter has been removed. Subclavian stent on the left and  that along the left humerus.. Mild to moderate cardiomegaly.  Interstitial edema.. No measurable pneumothorax. Trace pleural effusions  suspected..       No acute osseous or soft tissue abnormality is noted.        Impression:      Impression:   1.   Findings of fluid overload..        This report was finalized on 09/16/2021 07:44 by Dr. Anabela Washington MD.    XR Chest 1 View [[id number]] Collected: 09/16/21 0738     Updated: 09/16/21 0744    Narrative:      Frontal supine radiograph of the chest 9/16/2021 5:53 AM CDT     History: CENTRAL LINE PLACEMENT; I16.1-Hypertensive emergency;  J81.0-Acute pulmonary edema; N18.9-Chronic kidney disease, unspecified;  R73.9-Hyperglycemia, unspecified; J96.01-Acute respiratory failure with  hypoxia     Comparison: Chest x-ray dated 9/16/2021      Findings:   There is a new right IJ catheters which terminates in the right atrium.  Left subclavian stent is redemonstrated. Partial visualization of the  vascular stent along the left  humerus.. Bilateral central parenchymal  opacities in the left lower lung base opacity are grossly similar..  Moderate cardiomegaly..       No acute osseous or soft tissue abnormality is noted.        Impression:      Impression:   1.   Findings remain concerning for fluid overload.  2.  New right IJ catheter, without measurable pneumothorax.        This report was finalized on 09/16/2021 07:41 by Dr. Anabela Washington MD.        History of Present Illness on Day of Discharge: Patient states she is feeling much better and requests discharge home.    Hospital Course:  Ms. Upton is a 51-year-old  female who follows [facility] for primary care.  She has a medical history significant for end-stage renal disease on hemodialysis, hypertension, hypothyroidism, and poor medical compliance.  The patient presented to the [facility] emergency department on 9/16/2021 with complaints of shortness of breath.  Her oxygen saturation was 85% and she was placed on BiPAP in the emergency room.  Noted she was recently admitted to our facility from 9/10 through 9/13 found with decreased responsiveness at home.  She was found to be hypoglycemic at that time.  Chest x-ray showed concerns for fluid overload.  Elevated troponin and BNP levels noted, felt likely secondary to chronic renal failure and fluid overload.  EKG showed no acute ST-T wave changes.  The patient was also hypertensive on admission with blood pressure of 239/125.  She was placed on a nitroglycerin drip for a short period of time in the emergency department, then transfer to the intensive care unit on a Cardene drip.  She was admitted to the hospitalist service for further evaluation and management.    The patient was felt to have missed her dialysis treatment on 9/14, leading to her symptoms at presentation.  She did receive a dialysis treatment on the day of admission with improvement of her symptoms.  She was able to be weaned quickly from  "oxygen.  She also had dialysis today without event.    She was able to be weaned from Cardene drip.  Several of her home medication dosages were adjusted including Coreg, clonidine, hydralazine, and lisinopril.  The patient insists that she is compliant with her medication regimen however upon review from medication reconciliation technician in the pharmacy it was noted many of her medications had not been filled since June or July.  Also noted that the patient's TSH is under suppressed with low free T4.  She again indicates that she is compliant with her medication.  Her Synthroid dosage will be increased from 25 mcg to 50 mcg.  This will need to be rechecked on an outpatient basis by her primary care provider in the next 6 weeks.    The patient was hyperglycemic on admission with blood glucoses greater than 500.  These have much improved resuming her home insulin regimen.  Her blood glucose this afternoon was in the 30s which improved with oral treatment.  Likely labile blood glucoses secondary to noncompliance at home.  Her hemoglobin A1c is 11.4%.    Overall, the patient is hemodynamically stable and appropriate for discharge home today.  She will need to follow-up with her primary care provider in 1 week.    Condition on Discharge:  Medically stable    Physical Exam on Discharge:  BP (!) 184/75 (BP Location: Right arm, Patient Position: Lying)   Pulse 67   Temp 98.1 °F (36.7 °C) (Oral)   Resp 16   Ht 160 cm (62.99\")   Wt 76.7 kg (169 lb)   LMP  (LMP Unknown)   SpO2 94%   BMI 29.94 kg/m²   Physical Exam  Vitals and nursing note reviewed.   Constitutional:       General: She is not in acute distress.     Appearance: She is ill-appearing (chronically).   HENT:      Head: Normocephalic and atraumatic.   Cardiovascular:      Rate and Rhythm: Normal rate and regular rhythm.      Heart sounds: Murmur heard.     Sinus 61-74     Comments: Right internal jugular central line and right arterial line have been " removed, dressings in place.   Pulmonary:      Effort: Pulmonary effort is normal.      Breath sounds: Rales (bibasilar) present. No wheezing or rhonchi.   Abdominal:      General: Bowel sounds are normal. There is no distension.      Palpations: Abdomen is soft.      Tenderness: There is no abdominal tenderness.   Musculoskeletal:         General: No swelling or tenderness. Normal range of motion.      Cervical back: Normal range of motion and neck supple. No tenderness.   Skin:     General: Skin is warm and dry.      Coloration: Skin is pale.   Neurological:      General: No focal deficit present.      Mental Status: She is alert and oriented to person, place, and time.   Psychiatric:         Mood and Affect: Mood normal.         Behavior: Behavior normal.         Thought Content: Thought content normal.         Judgment: Judgment normal.     Discharge Disposition:  Home or Self Care    Discharge Medications:     Discharge Medications      Changes to Medications      Instructions Start Date   carvedilol 25 MG tablet  Commonly known as: COREG  What changed:   · medication strength  · how much to take   25 mg, Oral, 2 Times Daily With Meals      cloNIDine 0.3 MG tablet  Commonly known as: CATAPRES  What changed:   · medication strength  · how much to take   0.3 mg, Oral, Every 8 Hours Scheduled      hydrALAZINE 50 MG tablet  Commonly known as: APRESOLINE  What changed: how much to take   75 mg, Oral, 3 Times Daily      levothyroxine 50 MCG tablet  Commonly known as: SYNTHROID, LEVOTHROID  What changed:   · medication strength  · how much to take   50 mcg, Oral, Daily      lisinopril 40 MG tablet  Commonly known as: PRINIVIL,ZESTRIL  What changed:   · medication strength  · how much to take  · when to take this   40 mg, Oral, Daily         Continue These Medications      Instructions Start Date   albuterol sulfate  (90 Base) MCG/ACT inhaler  Commonly known as: PROVENTIL HFA;VENTOLIN HFA;PROAIR HFA   2 puffs,  Inhalation, Every 4 Hours PRN      BASAGLAR KWIKPEN 100 UNIT/ML injection pen   40 Units, Subcutaneous, Nightly      DULoxetine 60 MG capsule  Commonly known as: CYMBALTA   60 mg, Oral, Daily      insulin aspart 100 UNIT/ML injection  Commonly known as: novoLOG   10 Units, Subcutaneous, 3 Times Daily Before Meals      lactulose 10 GM/15ML solution  Commonly known as: CHRONULAC   20 g, Oral, 3 Times Daily      NIFEdipine XL 60 MG 24 hr tablet  Commonly known as: PROCARDIA XL   60 mg, Oral, 2 times daily      ondansetron 4 MG tablet  Commonly known as: ZOFRAN   4 mg, Oral, Every 8 Hours PRN      pregabalin 75 MG capsule  Commonly known as: LYRICA   75 mg, Oral, 2 Times Daily      rOPINIRole 4 MG tablet  Commonly known as: REQUIP   4 mg, Oral, Nightly      simvastatin 20 MG tablet  Commonly known as: ZOCOR   40 mg, Oral, Daily           Discharge Diet:   Diet Instructions     Diet: Consistent Carbohydrate, Cardiac, Renal; Thin      Discharge Diet:  Consistent Carbohydrate  Cardiac  Renal       Fluid Consistency: Thin        Activity at Discharge:   Activity Instructions     Activity as Tolerated      Measure Blood Pressure      Measure blood pressure twice daily. Record these measurements to discuss with PCP at follow-up        Discharge Care Plan/Instructions:   1.  Return for any acute or worsening symptoms.  2.  Importance of compliance with medication regimen and hemodialysis discussed.  3.  Numerous medication adjustments as above.  Blood pressure medications have been adjusted and patient is instructed to monitor her blood pressure readings at home.  4.  Synthroid dosage increased.  Will need repeat thyroid function testing as an outpatient in the next 6 weeks.    Follow-up Appointments:   1.  Primary care provider in 1 week.  2.  Nephrology in 1 to 2 weeks.    Test Results Pending at Discharge: We will follow blood cultures to completion.  No growth to date.    Electronically signed by GAEL Pizarro,  9/18/2021, 14:35 CDT.    Time: 40 minutes          Electronically signed by Narinder Madrid MD at 09/18/21 [id number]

## 2021-09-19 NOTE — OUTREACH NOTE
Prep Survey      Responses   [facility] patient discharged from?  [place]   Is LACE score < 7 ?  No   Emergency Room discharge w/ pulse ox?  No   Eligibility  Readm Mgmt   Discharge diagnosis  Hypertensive emergency   Does the patient have one of the following disease processes/diagnoses(primary or secondary)?  Other   Does the patient have Home health ordered?  No   Is there a DME ordered?  No   Prep survey completed?  Yes          Lachelle Delgado RN

## 2021-09-22 NOTE — OUTREACH NOTE
Medical Week 1 Survey      Responses   [facility] patient discharged from?  [place]   Does the patient have one of the following disease processes/diagnoses(primary or secondary)?  Other   Week 1 attempt successful?  Yes   Call start time  1543   Call end time  1548   Discharge diagnosis  Hypertensive emergency   Person spoke with today (if not patient) and relationship  Patient   Meds reviewed with patient/caregiver?  Yes   Is the patient having any side effects they believe may be caused by any medication additions or changes?  No   Does the patient have all medications ordered at discharge?  Yes   Is the patient taking all medications as directed (includes completed medication regime)?  Yes   Does the patient have a primary care provider?   Yes   Does the patient have an appointment with their PCP within 7 days of discharge?  Yes   Comments regarding PCP  9/27/21 fu appt   Has the patient kept scheduled appointments due by today?  N/A   What DME was ordered?  RW and W/c per [facility]    Has all DME been delivered?  No [RW came from [facility]]   Psychosocial issues?  No   Did the patient receive a copy of their discharge instructions?  Yes   Nursing interventions  Reviewed instructions with patient   What is the patient's perception of their health status since discharge?  Improving   Is the patient/caregiver able to teach back signs and symptoms related to disease process for when to call PCP?  Yes   Is the patient/caregiver able to teach back signs and symptoms related to disease process for when to call 911?  Yes   Is the patient/caregiver able to teach back the hierarchy of who to call/visit for symptoms/problems? PCP, Specialist, Home health nurse, Urgent Care, ED, 911  Yes   If the patient is a current smoker, are they able to teach back resources for cessation?  [id number]   Week 1 call completed?  Yes   Wrap up additional comments  Pt states her BP has been good,  reports having some right side pain  around hip area. Pt advised to go to ED if pain worsens. Pt verbalized understanding. Questions/concerns addressed.          Nhung Turcios RN

## 2021-09-29 NOTE — OUTREACH NOTE
Medical Week 2 Survey      Responses   [facility] patient discharged from?  [place]   Does the patient have one of the following disease processes/diagnoses(primary or secondary)?  Other   Week 2 attempt successful?  No   Unsuccessful attempts  Attempt 1          Lexi Thao RN

## 2021-09-30 NOTE — OUTREACH NOTE
Medical Week 2 Survey      Responses   [facility] patient discharged from?  [place]   Does the patient have one of the following disease processes/diagnoses(primary or secondary)?  Other   Week 2 attempt successful?  No   Unsuccessful attempts  Attempt 2          Brunilda Rojas RN

## 2021-10-05 PROBLEM — G93.49 UREMIC ENCEPHALOPATHY SYNDROME: Status: ACTIVE | Noted: 2021-01-01

## 2021-10-05 PROBLEM — E11.00 HYPEROSMOLAR HYPERGLYCEMIC STATE (HHS): Status: ACTIVE | Noted: 2021-01-01

## 2021-10-05 PROBLEM — N19 UREMIC ENCEPHALOPATHY SYNDROME: Status: ACTIVE | Noted: 2021-01-01

## 2021-10-05 NOTE — OUTREACH NOTE
Medical Week 1 Survey      Responses   [facility] patient discharged from?  [place]   Does the patient have one of the following disease processes/diagnoses(primary or secondary)?  Other   Revoke  Readmitted          Eri Helms RN

## 2021-10-05 NOTE — H&P
"    [facility] Medicine Services  HISTORY AND PHYSICAL    Date of Admission: 10/4/2021  Primary Care Physician: Yaron Wiggins APRN    Subjective     Chief Complaint: Altered mental status    History of Present Illness  51 year old female with PMH of ESRD, DM ID, HTN, hypothyroidism, tremors, non compliance that presents to the ER with AMS, fluid overload and hypertension. She has had similar presentations in the past due to not taking her medications and failing to go to HD. Tonight she appears a little worse than usual and is not responding questions or following commands. She would open eyes and whisper \"I am here\" and then back away. Still has gag reflex and reacts when her mouth is swabbed. Her blood sugar was >1500 and blood pressure 230/162.    Review of Systems   Unable to perform ROS: Mental status change     Past Medical History:   Past Medical History:   Diagnosis Date   • Anxiety    • Arthritis    • Chronic kidney disease     STAGE V RENAL FAILURE   • Depression    • Diabetes mellitus (CMS/HCC)    • HTN (hypertension)    • Hypothyroidism    • Obesity    • Tremors of nervous system      Past Surgical History:  Past Surgical History:   Procedure Laterality Date   • ARTERIOVENOUS FISTULA     • BREAST BIOPSY     • CARPAL TUNNEL RELEASE     • CATH LAB PROCEDURE     • CHOLECYSTECTOMY     • TUBAL ABDOMINAL LIGATION       Social History:  reports that she has been smoking. She has been smoking about 0.50 packs per day. She has never used smokeless tobacco. She reports that she does not drink alcohol and does not use drugs.    Family History: family history includes Cancer in an other family member; Diabetes in her maternal aunt, maternal grandmother, mother, and another family member; Heart disease in an other family member; Hypertension in an other family member; Kidney disease in an other family member.       Allergies:  Allergies   Allergen Reactions   • Penicillins " Hives and Shortness Of Breath   • Lamisil [Terbinafine]    • Reglan [Metoclopramide] GI Intolerance   • Stadol [Butorphanol] Nausea And Vomiting       Medications:  Prior to Admission medications    Medication Sig Start Date End Date Taking? Authorizing Provider   albuterol (PROVENTIL HFA;VENTOLIN HFA) 108 (90 BASE) MCG/ACT inhaler Inhale 2 puffs Every 4 (Four) Hours As Needed for wheezing.    Blanche Neil MD   carvedilol (COREG) 25 MG tablet Take 1 tablet by mouth 2 (Two) Times a Day With Meals. 9/18/21   Alejandra Kruse APRN   cloNIDine (CATAPRES) 0.3 MG tablet Take 1 tablet by mouth Every 8 (Eight) Hours. 9/18/21   Alejandra Kruse APRN   DULoxetine (CYMBALTA) 60 MG capsule Take 60 mg by mouth daily.    Blanche Neil MD   hydrALAZINE (APRESOLINE) 50 MG tablet Take 1.5 tablets by mouth 3 (Three) Times a Day. 9/18/21   Alejandra Kruse APRN   insulin aspart (novoLOG) 100 UNIT/ML injection Inject 10 Units under the skin into the appropriate area as directed 3 (Three) Times a Day Before Meals.    Blanche Neil MD   Insulin Glargine (BASAGLAR KWIKPEN) 100 UNIT/ML injection pen Inject 40 Units under the skin into the appropriate area as directed Every Night.    Blanche Neil MD   lactulose (CHRONULAC) 10 GM/15ML solution Take 20 g by mouth 3 (Three) Times a Day.    Blanche Neil MD   levothyroxine (SYNTHROID, LEVOTHROID) 50 MCG tablet Take 1 tablet by mouth Daily. 9/18/21   Alejandra Kruse APRN   lisinopril (PRINIVIL,ZESTRIL) 40 MG tablet Take 1 tablet by mouth Daily. 9/18/21   Alejandra Kruse APRN   NIFEdipine XL (PROCARDIA XL) 60 MG 24 hr tablet Take 60 mg by mouth 2 (two) times a day.    Blanche Neil MD   ondansetron (ZOFRAN) 4 MG tablet Take 4 mg by mouth Every 8 (Eight) Hours As Needed for nausea or vomiting.    Blanche Neil MD   pregabalin (LYRICA) 75 MG capsule Take 75 mg by mouth 2 (Two) Times a Day.    Provider, MD Blanceh  "  rOPINIRole (REQUIP) 4 MG tablet Take 4 mg by mouth Every Night.    Provider, MD Blanche   simvastatin (ZOCOR) 20 MG tablet Take 40 mg by mouth Daily.    Provider, MD Blanche     I have utilized all available immediate resources to obtain, update, and review the patient's current medications.    Objective     Vital Signs: BP (!) 230/162   Pulse 98   Temp 97 °F (36.1 °C) (Oral)   Ht 160 cm (62.99\")   Wt 76.7 kg (169 lb)   SpO2 94%   BMI 29.95 kg/m²   Physical Exam  Vitals reviewed.   Constitutional:       General: She is not in acute distress.     Appearance: She is well-developed. She is ill-appearing. She is not toxic-appearing or diaphoretic.      Comments: Intoxicated appearing aged female, markedly edematous and tremulous.   HENT:      Head: Normocephalic and atraumatic.      Right Ear: External ear normal.      Left Ear: External ear normal.      Mouth/Throat:      Mouth: Mucous membranes are dry.   Eyes:      General:         Right eye: No discharge.         Left eye: No discharge.      Extraocular Movements: Extraocular movements intact.      Conjunctiva/sclera: Conjunctivae normal.      Pupils: Pupils are equal, round, and reactive to light.   Neck:      Vascular: No JVD.   Cardiovascular:      Rate and Rhythm: Normal rate and regular rhythm.      Heart sounds: No murmur heard.     Pulmonary:      Effort: Pulmonary effort is normal. No respiratory distress.      Breath sounds: No stridor. Rales present. No wheezing or rhonchi.   Chest:      Chest wall: No tenderness.   Abdominal:      General: There is no distension.      Palpations: Abdomen is soft.      Tenderness: There is no abdominal tenderness. There is no guarding or rebound.      Hernia: No hernia is present.   Musculoskeletal:         General: No swelling, tenderness or deformity. Normal range of motion.      Cervical back: Normal range of motion and neck supple. No rigidity or tenderness. No muscular tenderness.      Right lower leg: " "Edema present.      Left lower leg: Edema present.   Skin:     General: Skin is dry.      Capillary Refill: Capillary refill takes less than 2 seconds.      Coloration: Skin is pale.      Findings: No erythema or rash.   Neurological:      General: No focal deficit present.      Cranial Nerves: No cranial nerve deficit.      Sensory: No sensory deficit.      Motor: Weakness (generalized) present. No abnormal muscle tone.     Results Reviewed:  Lab Results (last 24 hours)     Procedure Component Value Units Date/Time    Phosphorus [[id number]] Collected: 10/04/21 2344    Specimen: Blood from Arm, Right Updated: 10/05/21 0102    Troponin [[id number]] Collected: 10/04/21 2344    Specimen: Blood from Arm, Right Updated: 10/05/21 0101    Magnesium [[id number]] Collected: 10/04/21 2344    Specimen: Blood from Arm, Right Updated: 10/05/21 0101    Hemoglobin A1c [[id number]] Collected: 10/04/21 2344    Specimen: Blood from Arm, Right Updated: 10/05/21 0101    Procalcitonin [[id number]]  (Abnormal) Collected: 10/04/21 2344    Specimen: Blood from Arm, Right Updated: 10/05/21 0056     Procalcitonin 0.42 ng/mL     Narrative:      As a Marker for Sepsis (Non-Neonates):     1. <0.5 ng/mL represents a low risk of severe sepsis and/or septic shock.  2. >2 ng/mL represents a high risk of severe sepsis and/or septic shock.    As a Marker for Lower Respiratory Tract Infections that require antibiotic therapy:  PCT on Admission     Antibiotic Therapy             6-12 Hrs later  >0.5                          Strongly Recommended            >0.25 - <0.5             Recommended  0.1 - 0.25                  Discouraged                       Remeasure/reassess PCT  <0.1                         Strongly Discouraged         Remeasure/reassess PCT      As 28 day mortality risk marker: \"Change in Procalcitonin Result\" (>80% or <=80%) if Day 0 (or Day 1) and Day 4 values are available. Refer to [email or web address]/    Change in PCT " <=80 %   A decrease of PCT levels below or equal to 80% defines a positive change in PCT test result representing a higher risk for 28-day all-cause mortality of patients diagnosed with severe sepsis or septic shock.    Change in PCT >80 %   A decrease of PCT levels of more than 80% defines a negative change in PCT result representing a lower risk for 28-day all-cause mortality of patients diagnosed with severe sepsis or septic shock.                Comprehensive Metabolic Panel [[id number]]  (Abnormal) Collected: 10/04/21 2344    Specimen: Blood from Arm, Right Updated: 10/05/21 0050     Glucose >1,500 mg/dL      BUN 45 mg/dL      Creatinine 4.87 mg/dL      Sodium 104 mmol/L      Potassium 4.8 mmol/L      Comment: Slight hemolysis detected by analyzer. Results may be affected.        Chloride 67 mmol/L      CO2 17.0 mmol/L      Calcium 8.4 mg/dL      Total Protein 7.3 g/dL      Albumin 4.10 g/dL      ALT (SGPT) 7 U/L      AST (SGOT) 15 U/L      Alkaline Phosphatase 167 U/L      Total Bilirubin 0.6 mg/dL      eGFR Non African Amer 9 mL/min/1.73      Comment: <15 Indicative of kidney failure.        eGFR   Amer --     Comment: <15 Indicative of kidney failure.        Globulin 3.2 gm/dL      A/G Ratio 1.3 g/dL      BUN/Creatinine Ratio 9.2     Anion Gap 20.0 mmol/L     Narrative:      GFR Normal >60  Chronic Kidney Disease <60  Kidney Failure <15      Urine Drug Screen - Urine, Clean Catch [[id number]]  (Normal) Collected: 10/05/21 0031    Specimen: Urine, Clean Catch Updated: 10/05/21 0047     THC, Screen, Urine Negative     Phencyclidine (PCP), Urine Negative     Cocaine Screen, Urine Negative     Methamphetamine, Ur Negative     Opiate Screen Negative     Amphetamine Screen, Urine Negative     Benzodiazepine Screen, Urine Negative     Tricyclic Antidepressants Screen Negative     Methadone Screen, Urine Negative     Barbiturates Screen, Urine Negative     Oxycodone Screen, Urine Negative     Propoxyphene  Screen Negative     Buprenorphine, Screen, Urine Negative    Narrative:      Cutoff For Drugs Screened:    Amphetamines               500 ng/ml  Barbiturates               200 ng/ml  Benzodiazepines            150 ng/ml  Cocaine                    150 ng/ml  Methadone                  200 ng/ml  Opiates                    100 ng/ml  Phencyclidine               25 ng/ml  THC                            50 ng/ml  Methamphetamine            500 ng/ml  Tricyclic Antidepressants  300 ng/ml  Oxycodone                  100 ng/ml  Propoxyphene               300 ng/ml  Buprenorphine               10 ng/ml    The normal value for all drugs tested is negative. This report includes unconfirmed screening results, with the cutoff values listed, to be used for medical treatment purposes only.  Unconfirmed results must not be used for non-medical purposes such as employment or legal testing.  Clinical consideration should be applied to any drug of abuse test, particularly when unconfirmed results are used.      [place] Draw [[id number]] Collected: 10/04/21 2344    Specimen: Blood from Arm, Right Updated: 10/05/21 0045    Narrative:      The following orders were created for panel order [place] Draw.  Procedure                               Abnormality         Status                     ---------                               -----------         ------                     Green Top (Gel)[[id number]]                                  Final result               Lavender Top[[id number]]                                     Final result               Red Top[[id number]]                                          Final result               [place] Blood Culture Haider...[[id number]]                      Final result               Brunson Top[[id number]]                                         In process                 Light Blue Top[[id number]]                                   Final result                 Please view results for these tests on the  individual orders.    Green Top (Gel) [[id number]] Collected: 10/04/21 2344    Specimen: Blood from Arm, Right Updated: 10/05/21 0045     Extra Tube Hold for add-ons.     Comment: Auto resulted.       Lavender Top [[id number]] Collected: 10/04/21 2344    Specimen: Blood from Arm, Right Updated: 10/05/21 0045     Extra Tube hold for add-on     Comment: Auto resulted       Light Blue Top [[id number]] Collected: 10/04/21 2344    Specimen: Blood from Arm, Right Updated: 10/05/21 0045     Extra Tube hold for add-on     Comment: Auto resulted       Red Top [[id number]] Collected: 10/04/21 2344    Specimen: Blood from Arm, Right Updated: 10/05/21 0045     Extra Tube Hold for add-ons.     Comment: Auto resulted.       Irene Blood Culture Bottle Set [[id number]] Collected: 10/04/21 2344    Specimen: Blood from Arm, Right Updated: 10/05/21 0045     Extra Tube Hold for add-ons.     Comment: Auto resulted.       Urinalysis, Microscopic Only - Urine, Catheter [[id number]]  (Abnormal) Collected: 10/05/21 0031    Specimen: Urine, Catheter Updated: 10/05/21 0043     RBC, UA 3-5 /HPF      WBC, UA 6-12 /HPF      Bacteria, UA None Seen /HPF      Squamous Epithelial Cells, UA 3-6 /HPF      Hyaline Casts, UA 0-2 /LPF      Methodology Automated Microscopy    Urinalysis With Culture If Indicated - Urine, Catheter [[id number]]  (Abnormal) Collected: 10/05/21 0031    Specimen: Urine, Catheter Updated: 10/05/21 0043     Color, UA Yellow     Appearance, UA Clear     pH, UA 6.0     Specific Gravity, UA 1.021     Glucose, UA >=1000 mg/dL (3+)     Ketones, UA Negative     Bilirubin, UA Negative     Blood, UA Trace     Protein,  mg/dL (2+)     Leuk Esterase, UA Negative     Nitrite, UA Negative     Urobilinogen, UA 0.2 E.U./dL    Urine Culture - Urine, Urine, Catheter [[id number]] Collected: 10/05/21 0031    Specimen: Urine, Catheter Updated: 10/05/21 0043    Ammonia [[id number]]  (Normal) Collected: 10/04/21 2353    Specimen: Blood Updated:  10/05/21 0040     Ammonia 11 umol/L     Osmolality, Serum [[id number]]  (Abnormal) Collected: 10/04/21 2344    Specimen: Blood from Arm, Right Updated: 10/05/21 0036     Osmolality 333 mOsm/kg     Blood Gas, Arterial - [[id number]]  (Abnormal) Collected: 10/05/21 0024    Specimen: Arterial Blood Updated: 10/05/21 0029     Site Right Radial     Aaron's Test Positive     pH, Arterial 7.260 pH units      Comment: 84 Value below reference range        pCO2, Arterial 34.5 mm Hg      Comment: 84 Value below reference range        pO2, Arterial 71.0 mm Hg      Comment: 84 Value below reference range        HCO3, Arterial 15.4 mmol/L      Comment: 84 Value below reference range        Base Excess, Arterial -10.7 mmol/L      Comment: 84 Value below reference range        O2 Saturation, Arterial 93.6 %      Comment: 84 Value below reference range        Temperature 37.0 C      Barometric Pressure for Blood Gas 751 mmHg      Modality Nasal Cannula     Flow Rate 3.0 lpm      Ventilator Mode NA     Collected by [id number]     Comment: Meter: [id number]     :  [id number]        pCO2, Temperature Corrected 34.5 mm Hg      pH, Temp Corrected 7.260 pH Units      pO2, Temperature Corrected 71.0 mm Hg     BNP [[id number]] Collected: 10/04/21 2344    Specimen: Blood from Arm, Right Updated: 10/05/21 0019    CK [[id number]]  (Normal) Collected: 10/04/21 2344    Specimen: Blood from Arm, Right Updated: 10/05/21 0017     Creatine Kinase 95 U/L     Acetone [[id number]]  (Normal) Collected: 10/04/21 2344    Specimen: Blood from Arm, Right Updated: 10/05/21 0016     Acetone Negative    Blood Culture - Blood, Arm, Right [[id number]] Collected: 10/04/21 2344    Specimen: Blood from Arm, Right Updated: 10/05/21 0014    Lactic Acid, Plasma [[id number]]  (Normal) Collected: 10/04/21 2344    Specimen: Blood from Arm, Right Updated: 10/05/21 0013     Lactate 1.4 mmol/L     Lipase [[id number]]  (Abnormal) Collected: 10/04/21 2344    Specimen: Blood  from Arm, Right Updated: 10/05/21 0012     Lipase 84 U/L     Ethanol [[id number]] Collected: 10/04/21 2344    Specimen: Blood from Arm, Right Updated: 10/05/21 0012     Ethanol % <0.010 %     Narrative:      Not for legal purposes. Chain of Custody not followed.     Blood Culture - Blood, Arm, Right [[id number]] Collected: 10/05/21 0003    Specimen: Blood from Arm, Right Updated: 10/05/21 0011    aPTT [[id number]]  (Normal) Collected: 10/04/21 2344    Specimen: Blood from Arm, Right Updated: 10/05/21 0008     PTT 32.1 seconds     Protime-INR [[id number]]  (Abnormal) Collected: 10/04/21 2344    Specimen: Blood from Arm, Right Updated: 10/05/21 0008     Protime 14.4 Seconds      INR 1.16    CBC & Differential [[id number]]  (Abnormal) Collected: 10/04/21 2344    Specimen: Blood from Arm, Right Updated: 10/05/21 0002    Narrative:      The following orders were created for panel order CBC & Differential.  Procedure                               Abnormality         Status                     ---------                               -----------         ------                     CBC Auto Differential[[id number]]        Abnormal            Final result                 Please view results for these tests on the individual orders.    CBC Auto Differential [[id number]]  (Abnormal) Collected: 10/04/21 2344    Specimen: Blood from Arm, Right Updated: 10/05/21 0002     WBC 7.74 10*3/mm3      RBC 3.78 10*6/mm3      Hemoglobin 11.2 g/dL      Hematocrit 36.4 %      MCV 96.3 fL      MCH 29.6 pg      MCHC 30.8 g/dL      RDW 15.9 %      RDW-SD 56.2 fl      MPV 10.9 fL      Platelets 116 10*3/mm3      Neutrophil % 88.7 %      Lymphocyte % 5.3 %      Monocyte % 4.5 %      Eosinophil % 0.3 %      Basophil % 0.9 %      Immature Grans % 0.3 %      Neutrophils, Absolute 6.87 10*3/mm3      Lymphocytes, Absolute 0.41 10*3/mm3      Monocytes, Absolute 0.35 10*3/mm3      Eosinophils, Absolute 0.02 10*3/mm3      Basophils, Absolute 0.07 10*3/mm3       Immature Grans, Absolute 0.02 10*3/mm3      nRBC 0.0 /100 WBC     Brunson Top [[id number]] Collected: 10/04/21 2344    Specimen: Blood from Arm, Right Updated: 10/04/21 2353        Imaging Results (Last 24 Hours)     Procedure Component Value Units Date/Time    CT Head Without Contrast [[id number]] Resulted: 10/05/21 0057     Updated: 10/05/21 0057    XR Chest 1 View [[id number]]  (Abnormal) Resulted: 10/05/21 0000     Updated: 10/05/21 0002        I have personally reviewed and interpreted the radiology studies and ECG obtained at time of admission.     Assessment / Plan     Assessment:   Active Hospital Problems    Diagnosis    • **Hyperosmolar hyperglycemic state (HHS) (HCC)    • Uremic encephalopathy syndrome    • Hypertensive emergency    • Hypothyroidism    • End stage kidney disease (HCC)    • Seizure (HCC)      Plan:   Admit to critical care  Vitals per unit  Npo  DKA/HCC Insulin protocol  Serial labs  Cardene drip for blood pressure    COVID screen negative    Nephrology consult in AM for HD recommednations     HGC is mildly positive ?renal clearance repeat post HD or consider TV US rule out teratoma.     DVT prophylaxis > SCD/Lovenox 30 mg SQ daily    Palliative care    Code Status/Advanced Care Plan: Full    The patient's surrogate decision maker is see records.     I discussed my findings and recommendations with the patient.    Estimated length of stay is over 2 midnights.     The patient was seen and examined by me on 10/05/2021 at 0102.    Electronically signed by Ken Moon MD, 10/05/21, 01:02 CDT.

## 2021-10-05 NOTE — PAYOR COMM NOTE
"FROM: MIGUEL COLE  PHONE: [phone]  FAX: [phone]  MRN: [id number]  [id number]      [employer] escobar (51 y.o. Female)     Date of Birth Social Security Number Address Home Phone MRN    1970  [id number] [facility] [address] [phone] [id number]    Restoration Marital Status          Other        Admission Date Admission Type Admitting Provider Attending Provider Department, Room/Bed    10/4/21 Emergency Adán Merrill, Adán Arteaga,  [facility] CARDIAC CARE, C010/1    Discharge Date Discharge Disposition Discharge Destination                       Attending Provider: Adán Merrill DO    Allergies: Penicillins, Lamisil [Terbinafine], Reglan [Metoclopramide], Stadol [Butorphanol]    Isolation: None   Infection: None   Code Status: CPR    Ht: 158 cm (62.21\")   Wt: 73 kg (160 lb 14.4 oz)    Admission Cmt: None   Principal Problem: Hyperosmolar hyperglycemic state (HHS) (HCC) [E11.00,E11.65]                 Active Insurance as of 10/4/2021     Primary Coverage     Payor Plan Insurance Group Employer/Plan Group    WE[employer] [facility] MEDICARE REPLACEMENT WELL[employer]ARE MEDICARE REPLACEMENT      Payor Plan Address Payor Plan Phone Number Payor Plan Fax Number Effective Dates    [address] [address] [phone]  6/1/2021 - None Entered    [facility] [address]       Subscriber Name Subscriber Birth Date Member ID       CHEMO BIRD 1970 [id number]           Secondary Coverage     Payor Plan Insurance Group Employer/Plan Group    [place]      Payor Plan Address Payor Plan Phone Number Payor Plan Fax Number Effective Dates    [address] [phone]  8/17/2021 - None Entered    [facility] [address]       Subscriber Name Subscriber Birth Date Member ID       CHEMO BIRD 1970 [id number]                 Emergency Contacts      (Rel.) Home Phone Work Phone Mobile Phone    Kassandra Paez (Sister) [phone] -- [phone]    Dmitriy Galvez " (Friend) -- -- [phone]            Insurance Information                [employer] [place] MEDICARE REPLACEMENT/[employer]LL[employer] MEDICARE REPLACEMENT Phone: [phone]    Subscriber: Ena Upton Subscriber#: [id number]    Group#:  Precert#:         [place] Phone: [phone]    Subscriber: Ena Upton Subscriber#: [id number]    Group#:  Precert#:

## 2021-10-05 NOTE — PLAN OF CARE
Goal Outcome Evaluation:           Progress: no change  Outcome Summary: Initial RDN eval. Pt remains in unit with cardene and insulin infusing. Remains NPO and noted to be disoriented. She has hx of noncompliance with medical and dietary guidelines and multiple admission. RD will continue to follow and educate when Pt appropriate and agreeable.

## 2021-10-05 NOTE — ED PROVIDER NOTES
"Subjective   50 y/o female with history of ESRD and DM arrives for evaluation of AMS. She has been this way apparently per EMS since \"this morning.\" The patient arrives alert but not oriented, she moves all extremities but only moans at me. Given this no history can be obtained. BS in the field was \"hi.\" she has been admitted in the past with AMS secondary to DM issues. I cannot asertain about any trauma from EMS           Review of Systems   Unable to perform ROS: Mental status change       Past Medical History:   Diagnosis Date   • Anxiety    • Arthritis    • Chronic kidney disease     STAGE V RENAL FAILURE   • Depression    • Diabetes mellitus (CMS/HCC)    • HTN (hypertension)    • Hypothyroidism    • Obesity    • Tremors of nervous system        Allergies   Allergen Reactions   • Penicillins Hives and Shortness Of Breath   • Lamisil [Terbinafine]    • Reglan [Metoclopramide] GI Intolerance   • Stadol [Butorphanol] Nausea And Vomiting       Past Surgical History:   Procedure Laterality Date   • ARTERIOVENOUS FISTULA     • BREAST BIOPSY     • CARPAL TUNNEL RELEASE     • CATH LAB PROCEDURE     • CHOLECYSTECTOMY     • TUBAL ABDOMINAL LIGATION         Family History   Problem Relation Age of Onset   • Cancer Other    • Hypertension Other    • Kidney disease Other    • Heart disease Other    • Diabetes Other    • Diabetes Mother    • Diabetes Maternal Aunt    • Diabetes Maternal Grandmother        Social History     Socioeconomic History   • Marital status:      Spouse name: Not on file   • Number of children: Not on file   • Years of education: Not on file   • Highest education level: Not on file   Tobacco Use   • Smoking status: Current Every Day Smoker     Packs/day: 0.50   • Smokeless tobacco: Never Used   • Tobacco comment: CUTTING BACK AND TRYING TO STOP SMOKING   Substance and Sexual Activity   • Alcohol use: No   • Drug use: No   • Sexual activity: Defer           Objective   Physical Exam  Vitals and " nursing note reviewed.   Constitutional:       Appearance: She is well-developed and normal weight. She is ill-appearing.   HENT:      Head: Normocephalic and atraumatic.      Mouth/Throat:      Comments: Very dry  Eyes:      Extraocular Movements: Extraocular movements intact.      Pupils: Pupils are equal, round, and reactive to light.   Cardiovascular:      Rate and Rhythm: Regular rhythm. Tachycardia present.      Heart sounds: Normal heart sounds.   Pulmonary:      Effort: Pulmonary effort is normal. No respiratory distress.      Breath sounds: Normal breath sounds. No stridor. No wheezing or rhonchi.   Abdominal:      General: Bowel sounds are normal. There is no distension.      Palpations: Abdomen is soft. There is no mass.   Musculoskeletal:         General: Normal range of motion.      Cervical back: Normal range of motion and neck supple.   Skin:     General: Skin is warm.      Capillary Refill: Capillary refill takes 2 to 3 seconds.   Neurological:      Mental Status: She is alert. She is disoriented and confused.      GCS: GCS eye subscore is 4. GCS verbal subscore is 2. GCS motor subscore is 4.      Cranial Nerves: No cranial nerve deficit or dysarthria.      Comments: Moves all extremities, moans at me, will not follow commands.          Central Line At Bedside    Date/Time: 10/5/2021 12:56 AM  Performed by: Omar Akins MD  Authorized by: Omar Akins MD     Consent:     Consent obtained:  Emergent situation  Pre-procedure details:     Hand hygiene: Hand hygiene performed prior to insertion      Sterile barrier technique: All elements of maximal sterile technique followed      Skin preparation:  2% chlorhexidine    Skin preparation agent: Skin preparation agent completely dried prior to procedure    Anesthesia (see MAR for exact dosages):     Anesthesia method:  Local infiltration    Local anesthetic:  Lidocaine 1% w/o epi  Procedure details:     Location:  R femoral    Patient  "position:  Flat    Procedural supplies:  Triple lumen    Catheter size:  7 Fr    Landmarks identified: yes      Ultrasound guidance: no      Number of attempts:  1    Successful placement: yes    Post-procedure details:     Post-procedure:  Dressing applied    Assessment:  Blood return through all ports and free fluid flow    Patient tolerance of procedure:  Tolerated well, no immediate complications               ED Course    We will admit for HHS with AMS.       Attempting to avoid intubation given her GCS is above 8 and further how poorly HHS and DKA do when intubated.       ED Course as of Oct 05 0155   Tue Oct 05, 2021   0012 Not allowing much are to take place thus droperidol was given.     [JH]   0054 Corrected sodium at worst is 126 given this is >1500    [JH]   0155 CT of the head was ordered but not done as the patient would not sit still for a CT    [JH]      ED Course User Index  [JH] Omar Akins MD               Hospital Course:  The patient is a 51 y.o. female who presented to [facility] with altered mental status.  She has a past medical history significant for uncontrolled type 2 diabetes with insulin dependence, end-stage renal disease on hemodialysis, essential hypertension, tobacco abuse, hyperlipidemia, and hypothyroidism.  She follows with [facility] for her primary care.  Reportedly, family member found her at home with decreased responsiveness.  EMS was called and glucose reading would not register a glucometer.  She was treated with D50 and later started on a dextrose infusion in the emergency department.  She was also given Narcan.  She then became alert and oriented.  She denied shortness of breath, chest pain or pressure.  She states she ate dessert and then took insulin but states \" I do not know if I gave myself too much.\"  She is compliant with hemodialysis 3 times per week, Tuesday, Thursday, Saturday.  She did complete her most recent dialysis prior " to admission on Thursday 9/9.  She was found to be hypokalemic with potassium 2.3.  Troponins were checked and were flat trending-0.169 at the highest.  Chest x-ray and CT of the head unremarkable.  She was admitted to the hospital service for further evaluation and management.     Hemoglobin A1c 11.40.  Her blood glucose has much improved with last reading 271.  She is eating and drinking well.  No further hypoglycemic episodes.  She takes NovoLog 8 to 10 units 3 times per day with meals and Basaglar 40 units nightly.  She is and oriented x3.  She feels back to baseline.  No sign of infection identified.  Blood pressure has improved and she was hypertensive this morning.  Antihypertensive occasions resumed most recent blood pressure 154/67.  She was seen in consultation by cardiology. EKGs reveal sinus rhythm, prolonged QTC-up to 583, T wave inversions in the lateral leads as well as V4, with ST depression in 2, aVF, V4, V5, V6--improved on repeat.  June 2021 EKGs also revealed lateral T wave inversions.  She underwent cardiac catheterization in March 2020 at [facility].  Cath revealed nonobstructive coronary artery disease and normal left ventricular ejection fraction.  Per cardiology, chronically elevated troponin in the setting of end-stage renal disease.  She has no ischemic symptoms and no evidence of acute coronary syndrome.  She has noncardiac chest pain that was tender to palpation.  No further cardiac testing or treatment recommended.  She was seen in consultation by nephrology, she tolerated dialysis on Tuesday 9/12.  She has anemia of chronic disease with hemoglobin 7.2.  No signs of active bleeding.  Dr. Ann is okay with patient going home today and will see her in clinic tomorrow to discuss anemia/Retacrit.  She will have hemodialysis tomorrow per routine schedule Tuesday, Thursday, Saturday.  Overall, patient reports feeling well.  She denies shortness of breath, chest pain or pressure.  She is tolerating  a diet.  She is eager for discharge home.  She has worked with physical and Occupational Therapy.  States she has all needed DME at home.  Therapy recommends home with assistance.  Patient states she does live home alone but she has her boyfriend and ends check on her.  Feel as though she is stable and appropriate for discharge home today.  She is to follow-up with [facility] within 1 week.                       XR Chest 1 View   ED Interpretation   Abnormal   Bilateral opacities       CT Head Without Contrast    (Results Pending)     Labs Reviewed   COMPREHENSIVE METABOLIC PANEL - Abnormal; Notable for the following components:       Result Value    Glucose >1,500 (*)     BUN 45 (*)     Creatinine 4.87 (*)     Sodium 104 (*)     Chloride 67 (*)     CO2 17.0 (*)     Calcium 8.4 (*)     Alkaline Phosphatase 167 (*)     eGFR Non  Amer 9 (*)     Anion Gap 20.0 (*)     All other components within normal limits    Narrative:     GFR Normal >60  Chronic Kidney Disease <60  Kidney Failure <15     LIPASE - Abnormal; Notable for the following components:    Lipase 84 (*)     All other components within normal limits   OSMOLALITY, SERUM - Abnormal; Notable for the following components:    Osmolality 333 (*)     All other components within normal limits   URINALYSIS W/ CULTURE IF INDICATED - Abnormal; Notable for the following components:    Glucose, UA >=1000 mg/dL (3+) (*)     Blood, UA Trace (*)     Protein,  mg/dL (2+) (*)     All other components within normal limits   PROTIME-INR - Abnormal; Notable for the following components:    INR 1.16 (*)     All other components within normal limits   CBC WITH AUTO DIFFERENTIAL - Abnormal; Notable for the following components:    Hemoglobin 11.2 (*)     MCHC 30.8 (*)     RDW 15.9 (*)     RDW-SD 56.2 (*)     Platelets 116 (*)     Neutrophil % 88.7 (*)     Lymphocyte % 5.3 (*)     Monocyte % 4.5 (*)     Lymphocytes, Absolute 0.41 (*)     All other components  "within normal limits   BNP (IN-HOUSE) - Abnormal; Notable for the following components:    proBNP >70,000.0 (*)     All other components within normal limits    Narrative:     Among patients with dyspnea, NT-proBNP is highly sensitive for the detection of acute congestive heart failure. In addition NT-proBNP of <300 pg/ml effectively rules out acute congestive heart failure with 99% negative predictive value.    Results may be falsely decreased if patient taking Biotin.     PROCALCITONIN - Abnormal; Notable for the following components:    Procalcitonin 0.42 (*)     All other components within normal limits    Narrative:     As a Marker for Sepsis (Non-Neonates):     1. <0.5 ng/mL represents a low risk of severe sepsis and/or septic shock.  2. >2 ng/mL represents a high risk of severe sepsis and/or septic shock.    As a Marker for Lower Respiratory Tract Infections that require antibiotic therapy:  PCT on Admission     Antibiotic Therapy             6-12 Hrs later  >0.5                          Strongly Recommended            >0.25 - <0.5             Recommended  0.1 - 0.25                  Discouraged                       Remeasure/reassess PCT  <0.1                         Strongly Discouraged         Remeasure/reassess PCT      As 28 day mortality risk marker: \"Change in Procalcitonin Result\" (>80% or <=80%) if Day 0 (or Day 1) and Day 4 values are available. Refer to http://www.[employer]-pct-calculator.com/    Change in PCT <=80 %   A decrease of PCT levels below or equal to 80% defines a positive change in PCT test result representing a higher risk for 28-day all-cause mortality of patients diagnosed with severe sepsis or septic shock.    Change in PCT >80 %   A decrease of PCT levels of more than 80% defines a negative change in PCT result representing a lower risk for 28-day all-cause mortality of patients diagnosed with severe sepsis or septic shock.               BLOOD GAS, ARTERIAL - Abnormal; Notable for " the following components:    pH, Arterial 7.260 (*)     pCO2, Arterial 34.5 (*)     pO2, Arterial 71.0 (*)     HCO3, Arterial 15.4 (*)     Base Excess, Arterial -10.7 (*)     O2 Saturation, Arterial 93.6 (*)     pCO2, Temperature Corrected 34.5 (*)     pH, Temp Corrected 7.260 (*)     pO2, Temperature Corrected 71.0 (*)     All other components within normal limits   URINALYSIS, MICROSCOPIC ONLY - Abnormal; Notable for the following components:    RBC, UA 3-5 (*)     WBC, UA 6-12 (*)     Squamous Epithelial Cells, UA 3-6 (*)     All other components within normal limits   PHOSPHORUS - Abnormal; Notable for the following components:    Phosphorus 6.0 (*)     All other components within normal limits   HEMOGLOBIN A1C - Abnormal; Notable for the following components:    Hemoglobin A1C 11.50 (*)     All other components within normal limits    Narrative:     Hemoglobin A1C Ranges:    Increased Risk for Diabetes  5.7% to 6.4%  Diabetes                     >= 6.5%  Diabetic Goal                < 7.0%   TROPONIN (IN-HOUSE) - Abnormal; Notable for the following components:    Troponin T 0.150 (*)     All other components within normal limits    Narrative:     Troponin T Reference Range:  <= 0.03 ng/mL-   Negative for AMI  >0.03 ng/mL-     Abnormal for myocardial necrosis.  Clinicians would have to utilize clinical acumen, EKG, Troponin and serial changes to determine if it is an Acute Myocardial Infarction or myocardial injury due to an underlying chronic condition.       Results may be falsely decreased if patient taking Biotin.     COVID-19,GRAFF BIO IN-HOUSE,NASAL SWAB NO TRANSPORT MEDIA 2 HR TAT - Normal    Narrative:     Fact sheet for providers: https://www.fda.gov/media/596718/download     Fact sheet for patients: https://www.fda.gov/media/390509/download    Test performed by PCR.    Consider negative results in combination with clinical observations, patient history, and epidemiological information.  Fact sheet for  providers: https://www.fda.gov/media/342223/download     Fact sheet for patients: https://www.fda.gov/media/607199/download    Test performed by PCR.    Consider negative results in combination with clinical observations, patient history, and epidemiological information.   ACETONE - Normal   CK - Normal   APTT - Normal   LACTIC ACID, PLASMA - Normal   URINE DRUG SCREEN - Normal    Narrative:     Cutoff For Drugs Screened:    Amphetamines               500 ng/ml  Barbiturates               200 ng/ml  Benzodiazepines            150 ng/ml  Cocaine                    150 ng/ml  Methadone                  200 ng/ml  Opiates                    100 ng/ml  Phencyclidine               25 ng/ml  THC                            50 ng/ml  Methamphetamine            500 ng/ml  Tricyclic Antidepressants  300 ng/ml  Oxycodone                  100 ng/ml  Propoxyphene               300 ng/ml  Buprenorphine               10 ng/ml    The normal value for all drugs tested is negative. This report includes unconfirmed screening results, with the cutoff values listed, to be used for medical treatment purposes only.  Unconfirmed results must not be used for non-medical purposes such as employment or legal testing.  Clinical consideration should be applied to any drug of abuse test, particularly when unconfirmed results are used.     AMMONIA - Normal   MAGNESIUM - Normal   COVID PRE-OP / PRE-PROCEDURE SCREENING ORDER (NO ISOLATION)    Narrative:     The following orders were created for panel order COVID PRE-OP / PRE-PROCEDURE SCREENING ORDER (NO ISOLATION) - Swab, Nasal Cavity.  Procedure                               Abnormality         Status                     ---------                               -----------         ------                     COVID-19,[employer] IN-RONNIE...[[id number]]  Normal              Final result                 Please view results for these tests on the individual orders.   BLOOD CULTURE   BLOOD CULTURE   URINE  CULTURE   BLOOD GAS, ARTERIAL   ETHANOL    Narrative:     Not for legal purposes. Chain of Custody not followed.    RAINBOW DRAW    Narrative:     The following orders were created for panel order [place] Draw.  Procedure                               Abnormality         Status                     ---------                               -----------         ------                     Green Top (Gel)[[id number]]                                  Final result               Lavender Top[[id number]]                                     Final result               Red Top[[id number]]                                          Final result               [place] Blood Culture Haider...[[id number]]                      Final result               Brunson Top[[id number]]                                         In process                 Light Blue Top[[id number]]                                   Final result                 Please view results for these tests on the individual orders.   BASIC METABOLIC PANEL   BASIC METABOLIC PANEL   MAGNESIUM   MAGNESIUM   PHOSPHORUS   PHOSPHORUS   HCG, SERUM, QUALITATIVE   POCT GLUCOSE FINGERSTICK   POCT GLUCOSE FINGERSTICK   POCT GLUCOSE FINGERSTICK   POCT GLUCOSE FINGERSTICK   POCT GLUCOSE FINGERSTICK   POCT GLUCOSE FINGERSTICK   POCT GLUCOSE FINGERSTICK   POCT GLUCOSE FINGERSTICK   POCT GLUCOSE FINGERSTICK   POCT GLUCOSE FINGERSTICK   POCT GLUCOSE FINGERSTICK   POCT GLUCOSE FINGERSTICK   POCT GLUCOSE FINGERSTICK   POCT GLUCOSE FINGERSTICK   POCT GLUCOSE FINGERSTICK   POCT GLUCOSE FINGERSTICK   POCT GLUCOSE FINGERSTICK   POCT GLUCOSE FINGERSTICK   POCT GLUCOSE FINGERSTICK   POCT GLUCOSE FINGERSTICK   POCT GLUCOSE FINGERSTICK   POCT GLUCOSE FINGERSTICK   POCT GLUCOSE FINGERSTICK   POCT GLUCOSE FINGERSTICK   POCT GLUCOSE FINGERSTICK   GREEN TOP   LAVENDER TOP   RED TOP   RAINBOW BLOOD CULTURE BOTTLES - 1 SET   LIGHT BLUE TOP   CBC AND DIFFERENTIAL    Narrative:     The following orders were created for panel  order CBC & Differential.  Procedure                               Abnormality         Status                     ---------                               -----------         ------                     CBC Auto Differential[[id number]]        Abnormal            Final result                 Please view results for these tests on the individual orders.   GRAY TOP                 [facility]    Final diagnoses:   Hyperosmolar hyperglycemic state (HHS) (HCC)   Altered mental status, unspecified altered mental status type   Pneumonia due to infectious organism, unspecified laterality, unspecified part of lung       ED Disposition  ED Disposition     ED Disposition Condition Comment    Decision to Admit  Level of Care: Critical Care [6]   Diagnosis: Hyperosmolar hyperglycemic state (HHS) (HCC) [[id number]]   Admitting Physician: MUNA PHAN [[id number]]   Attending Physician: MUNA PHAN [[id number]]   Isolate for COVID?: No [0]   Certification: I Certify That Inpatient Hospital Services Are Medically Necessary For Greater Than 2 Midnights            No follow-up provider specified.       Medication List      No changes were made to your prescriptions during this visit.          Omar Akins MD  10/05/21 0056       Omar Akins MD  10/05/21 0058       Omar Akins MD  10/05/21 0100       Omar Akins MD  10/05/21 0102       Omar Akins MD  10/05/21 0155

## 2021-10-05 NOTE — CONSULTS
Nephrology ([facility]) Consult Note      Patient:  Ena Upton  YOB: 1970  Date of Service: 10/5/2021  MRN: [id number]   Acct: [id number]   Primary Care Physician: Yaron Wiggins APRN  Advance Directive:   Code Status and Medical Interventions:   Ordered at: 10/05/21 0105     Code Status:    CPR     Medical Interventions (Level of Support Prior to Arrest):    Full     Admit Date: 10/4/2021       Hospital Day: 0  Referring Provider: No ref. provider found      Patient personally seen and examined.  Complete chart including Consults, Notes, Operative Reports, Labs, Cardiology, and Radiology studies reviewed as able.        Subjective:  Ena Upton is a 51 y.o. female  whom we were consulted for end stage renal disease. [facility] Unknown last time she went to HD.  History of type 2 diabetes, hypertension, hypothyroidism.  Frequent hospitalizations due to noncompliance with dialysis and her other medical treatments. Was just discharged from [facility] a few weeks ago.  Presented this time with altered mental status, initial blood glucose was >1500. No other history of recent events is known at this time. Patient has been obtunded, will open eyes to voice but is nonverbal.     Allergies:  Penicillins, Lamisil [terbinafine], Reglan [metoclopramide], and Stadol [butorphanol]    Home Meds:  Medications Prior to Admission   Medication Sig Dispense Refill Last Dose   • albuterol (PROVENTIL HFA;VENTOLIN HFA) 108 (90 BASE) MCG/ACT inhaler Inhale 2 puffs Every 4 (Four) Hours As Needed for wheezing.      • carvedilol (COREG) 25 MG tablet Take 1 tablet by mouth 2 (Two) Times a Day With Meals. 60 tablet 3    • cloNIDine (CATAPRES) 0.3 MG tablet Take 1 tablet by mouth Every 8 (Eight) Hours. 90 tablet 3    • DULoxetine (CYMBALTA) 60 MG capsule Take 60 mg by mouth daily.      • hydrALAZINE (APRESOLINE) 50 MG tablet  Take 1.5 tablets by mouth 3 (Three) Times a Day. 120 tablet 3    • insulin aspart (novoLOG) 100 UNIT/ML injection Inject 10 Units under the skin into the appropriate area as directed 3 (Three) Times a Day Before Meals.      • Insulin Glargine (BASAGLAR KWIKPEN) 100 UNIT/ML injection pen Inject 40 Units under the skin into the appropriate area as directed Every Night.      • lactulose (CHRONULAC) 10 GM/15ML solution Take 20 g by mouth 3 (Three) Times a Day.      • levothyroxine (SYNTHROID, LEVOTHROID) 50 MCG tablet Take 1 tablet by mouth Daily. 30 tablet 3    • lisinopril (PRINIVIL,ZESTRIL) 40 MG tablet Take 1 tablet by mouth Daily. 30 tablet 3    • NIFEdipine XL (PROCARDIA XL) 60 MG 24 hr tablet Take 60 mg by mouth 2 (two) times a day.      • ondansetron (ZOFRAN) 4 MG tablet Take 4 mg by mouth Every 8 (Eight) Hours As Needed for nausea or vomiting.      • pregabalin (LYRICA) 75 MG capsule Take 75 mg by mouth 2 (Two) Times a Day.      • rOPINIRole (REQUIP) 4 MG tablet Take 4 mg by mouth Every Night.      • simvastatin (ZOCOR) 20 MG tablet Take 40 mg by mouth Daily.          Medicines:  Current Facility-Administered Medications   Medication Dose Route Frequency Provider Last Rate Last Admin   • dextrose (D50W) 25 g/ 50mL Intravenous Solution 12.5 g  12.5 g Intravenous PRN Ken Moon MD       • dextrose 5 % and sodium chloride 0.45 % infusion  150 mL/hr Intravenous Continuous PRN Ken Moon MD       • dextrose 5 % and sodium chloride 0.45 % with KCl 20 mEq/L infusion  150 mL/hr Intravenous Continuous PRN Ken Moon MD       • dextrose 5 % and sodium chloride 0.45 % with KCl 40 mEq/L infusion  150 mL/hr Intravenous Continuous PRN Ken Moon MD       • dextrose 5 % and sodium chloride 0.9 % infusion  150 mL/hr Intravenous Continuous PRN Ken Moon MD       • dextrose 5 % and sodium chloride 0.9 % with KCl 20 mEq/L infusion  150 mL/hr Intravenous  Continuous PRN Ken Moon MD       • dextrose 5 % and sodium chloride 0.9 % with KCl 40 mEq/L infusion  150 mL/hr Intravenous Continuous PRN Ken Moon MD       • enoxaparin (LOVENOX) syringe 30 mg  30 mg Subcutaneous Nightly Ken Moon MD       • famotidine (PEPCID) injection 20 mg  20 mg Intravenous Daily Ken Moon MD       • insulin regular (HumuLIN R,NovoLIN R) 100 Units in sodium chloride 0.9 % 100 mL (1 Units/mL) infusion  7 Units/hr Intravenous Titrated Ken Moon MD 7 mL/hr at 10/05/21 0145 7 Units/hr at 10/05/21 0145   • ipratropium-albuterol (DUO-NEB) nebulizer solution 3 mL  3 mL Nebulization Q6H - RT Ken Moon MD   3 mL at 10/05/21 0645   • ipratropium-albuterol (DUO-NEB) nebulizer solution 3 mL  3 mL Nebulization Q6H PRN Ken Moon MD       • levothyroxine (SYNTHROID, LEVOTHROID) tablet 50 mcg  50 mcg Oral Q AM Ken Moon MD       • niCARdipine (CARDENE) 25 mg in 250 mL NS infusion  5-15 mg/hr Intravenous Titrated Omra Akins MD 75 mL/hr at 10/05/21 0750 7.5 mg/hr at 10/05/21 0750   • ondansetron (ZOFRAN) injection 4 mg  4 mg Intravenous Q6H PRN Ken Moon MD       • sodium chloride 0.45 % infusion  250 mL/hr Intravenous Continuous PRN Ken Moon MD       • sodium chloride 0.45 % with KCl 20 mEq/L infusion  250 mL/hr Intravenous Continuous PRN Ken Moon MD       • sodium chloride 0.9 % flush 10 mL  10 mL Intravenous PRN Ken Moon MD       • sodium chloride 0.9 % flush 10 mL  10 mL Intravenous Once PRN Ken Moon MD       • sodium chloride 0.9 % flush 10 mL  10 mL Intravenous Q12H Ken Moon MD       • sodium chloride 0.9 % flush 10 mL  10 mL Intravenous PRN Ken Moon MD       • sodium chloride 0.9 % flush 3 mL  3 mL Intravenous Q12H Ken Moon MD       • sodium  chloride 0.9 % infusion  250 mL/hr Intravenous Continuous PRN Ken Moon MD   Stopped at 10/05/21 0326   • sodium chloride 0.9 % infusion  10 mL/hr Intravenous Continuous PRN Ken Moon MD       • sodium chloride 0.9 % with KCl 20 mEq/L infusion  250 mL/hr Intravenous Continuous PRN Ken Moon  mL/hr at 10/05/21 0815 250 mL/hr at 10/05/21 0815   • sodium chloride 0.9 % with KCl 40 mEq/L infusion  250 mL/hr Intravenous Continuous PRN Ken Moon MD           Past Medical History:  Past Medical History:   Diagnosis Date   • Anxiety    • Arthritis    • Chronic kidney disease     STAGE V RENAL FAILURE   • Depression    • Diabetes mellitus (CMS/HCC)    • HTN (hypertension)    • Hypothyroidism    • Obesity    • Tremors of nervous system        Past Surgical History:  Past Surgical History:   Procedure Laterality Date   • ARTERIOVENOUS FISTULA     • BREAST BIOPSY     • CARPAL TUNNEL RELEASE     • CATH LAB PROCEDURE     • CHOLECYSTECTOMY     • TUBAL ABDOMINAL LIGATION         Family History  Family History   Problem Relation Age of Onset   • Cancer Other    • Hypertension Other    • Kidney disease Other    • Heart disease Other    • Diabetes Other    • Diabetes Mother    • Diabetes Maternal Aunt    • Diabetes Maternal Grandmother        Social History  Social History     Socioeconomic History   • Marital status:      Spouse name: Not on file   • Number of children: Not on file   • Years of education: Not on file   • Highest education level: Not on file   Tobacco Use   • Smoking status: Current Every Day Smoker     Packs/day: 0.50   • Smokeless tobacco: Never Used   • Tobacco comment: CUTTING BACK AND TRYING TO STOP SMOKING   Substance and Sexual Activity   • Alcohol use: No   • Drug use: No   • Sexual activity: Defer         Review of Systems:   unable to obtain due to altered mental status.      Objective:  Patient Vitals for the past 24 hrs:   BP Temp  "Temp src Pulse Resp SpO2 Height Weight   10/05/21 0650 -- -- -- 90 23 94 % -- --   10/05/21 0645 145/57 -- -- 90 (!) 33 93 % -- --   10/05/21 0630 137/67 -- -- 90 -- 91 % -- --   10/05/21 0615 144/61 -- -- 91 -- 93 % -- --   10/05/21 0600 159/62 -- -- 92 24 94 % -- --   10/05/21 0545 138/53 -- -- 94 -- 93 % -- --   10/05/21 0530 138/62 -- -- 94 -- 94 % -- --   10/05/21 0515 150/58 -- -- 95 -- 93 % -- --   10/05/21 0500 152/60 -- -- 96 (!) 29 93 % -- --   10/05/21 0430 154/51 -- -- 97 -- 92 % -- --   10/05/21 0415 156/55 98.4 °F (36.9 °C) Oral 99 -- 95 % -- --   10/05/21 0400 159/69 -- -- 100 28 93 % -- --   10/05/21 0345 154/60 -- -- 101 -- 94 % -- --   10/05/21 0330 173/54 -- -- 101 -- 93 % -- --   10/05/21 0315 158/62 -- -- 103 -- 92 % -- --   10/05/21 0300 (!) 148/110 -- -- 104 (!) 30 95 % -- --   10/05/21 0245 (!) 218/81 -- -- 104 -- 95 % -- --   10/05/21 0244 -- -- -- 104 (!) 32 95 % -- --   10/05/21 0238 -- -- -- 104 (!) 33 93 % -- --   10/05/21 0230 (!) 249/85 -- -- 102 -- 92 % -- --   10/05/21 0220 (!) 238/86 -- -- 102 -- 93 % -- --   10/05/21 0215 (!) 212/133 -- -- 102 -- 93 % -- --   10/05/21 0210 -- 97.9 °F (36.6 °C) Axillary -- -- -- 158 cm (62.21\") 73 kg (160 lb 14.4 oz)   10/05/21 0146 170/94 -- -- 99 24 100 % -- --   10/05/21 0101 (!) 229/96 -- -- 97 24 91 % -- --   10/05/21 0052 (!) 230/162 -- -- 98 -- -- -- --   10/05/21 0044 -- 97 °F (36.1 °C) Oral -- -- -- -- --   10/05/21 0020 166/99 -- -- 98 -- 94 % -- --   10/04/21 2342 -- -- -- 87 -- 93 % 160 cm (62.99\") 76.7 kg (169 lb)       Intake/Output Summary (Last 24 hours) at 10/5/2021 0841  Last data filed at 10/5/2021 0427  Gross per 24 hour   Intake 566.32 ml   Output 200 ml   Net 366.32 ml     General: lethargic  Chest:  clear to auscultation bilaterally without respiratory distress  CVS: regular rate and rhythm  Abdominal: soft, nontender, positive bowel sounds  Extremities: trace edema  Skin: warm and dry without rash      Labs:  Results from " last 7 days   Lab Units 10/05/21  0422 10/05/21  0225 10/04/21  2344   WBC 10*3/mm3 7.78 7.28 7.74   HEMOGLOBIN g/dL 9.9* 10.4* 11.2*   HEMATOCRIT % 30.6* 32.7* 36.4   PLATELETS 10*3/mm3 106* 101* 116*         Results from last 7 days   Lab Units 10/05/21  0709 10/05/21  0624 10/05/21  0526 10/05/21  0422 10/05/21  0422 10/05/21  0318 10/05/21  0224 10/04/21  2344 10/04/21  2344   SODIUM mmol/L  --   --   --   --  110*  --  108*  --  104*   POTASSIUM mmol/L  --   --   --   --  3.6  --  4.2  --  4.8   CHLORIDE mmol/L  --   --   --   --  75*  --  71*  --  67*   CO2 mmol/L  --   --   --   --  14.0*  --  14.0*  --  17.0*   BUN mg/dL  --   --   --   --  44*  --  44*  --  45*   CREATININE mg/dL  --   --   --   --  4.75*  --  4.87*  --  4.87*   CALCIUM mg/dL  --   --   --   --  7.8*  --  7.8*  --  8.4*   BILIRUBIN mg/dL  --   --   --   --   --   --   --   --  0.6   ALK PHOS U/L  --   --   --   --   --   --   --   --  167*   ALT (SGPT) U/L  --   --   --   --   --   --   --   --  7   AST (SGOT) U/L  --   --   --   --   --   --   --   --  15   GLUCOSE mg/dL 1,273* 1,315* 1,383*   < > 1,448*   < > >1,500*   < > >1,500*    < > = values in this interval not displayed.       Radiology:   Imaging Results (Last 72 Hours)     Procedure Component Value Units Date/Time    XR Chest 1 View [[id number]] Collected: 10/05/21 0712     Updated: 10/05/21 0717    Narrative:      EXAMINATION: Chest 1 view 10/4/2021     HISTORY: Altered mental status     FINDINGS: Upright frontal projection of chest is compared to previous  study of 9/16/2021. There are persistent diffuse increased interstitial  markings of the lungs as well as mild cardiomegaly. Left atrial  enlargement is suspected with splaying of the mainstem bronchi. A right  basilar infiltrate is present. There is no effusion or free air present.       Impression:      1.. Cardiomegaly. There is left atrial enlargement.  2. Increased interstitial markings which appear to be somewhat  chronic  in nature. There is more confluent disease in the right lung base  suspicious for pneumonia.  This report was finalized on 10/05/2021 07:14 by Dr. Carlos Alberto Koehler MD.    CT Head Without Contrast [[id number]] Resulted: 10/05/21 0057     Updated: 10/05/21 0222          Culture:  No results found for: BLOODCX, URINECX, WOUNDCX, MRSACX, RESPCX, STOOLCX      Assessment   1.  End stage renal disease on HD TTS  2.  Type 2 diabetes  3.  Hypertension  4.  Hyperosmolar hyperglycemic state  5.  Pseudohyponatremia + genuine hyponatremia due to volume overload  6.  Metabolic acidosis  7.  Metabolic encephalopathy  8.  Anemia of CKD    Plan:  1.  Dialysis today  2.  Corrected sodium approximately 132 at this time  3.  Would recommend caution with use of the DKA protocol in an anuric patient. Large volume IV fluid administration should be done very judiciously.  4.  Further assessment and plan pending Dr Blood's evaluation of patient      Thank you for the consult, we appreciate the opportunity to provide care to your patients.  Feel free to contact me if I can be of any further assistance.      Delmar Salazar, APRN  10/5/2021  08:41 CDT
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
## 2021-10-05 NOTE — CASE MANAGEMENT/SOCIAL WORK
Discharge Planning Assessment  [facility]     Patient Name: Ena Upton  MRN: [id number]  Today's Date: 10/5/2021    Admit Date: 10/4/2021    Discharge Needs Assessment     Row Name 10/05/21 0935       Living Environment    Lives With  alone    Current Living Arrangements  home/apartment/condo    Primary Care Provided by  self    Provides Primary Care For  no one    Family Caregiver if Needed  none    Quality of Family Relationships  unable to assess    Able to Return to Prior Arrangements  yes       Resource/Environmental Concerns    Resource/Environmental Concerns  none       Transition Planning    Patient/Family Anticipates Transition to  home    Patient/Family Anticipated Services at Transition  none    Transportation Anticipated  family or friend will provide;health plan transportation       Discharge Needs Assessment    Readmission Within the Last 30 Days  other (see comments) Patient is noncompliant.    Current Outpatient/Agency/Support Group  outpatient hemodialysis    Equipment Currently Used at Home  cane, straight;glucometer;walker, standard;wheelchair    Concerns to be Addressed  discharge planning    Outpatient/Agency/Support Group Needs  outpatient hemodialysis    Discharge Facility/Level of Care Needs  nursing facility, skilled    Current Discharge Risk  chronically ill;non-alliance;lives alone    Discharge Coordination/Progress  Familiar with patient from previous admissions.  Patient resides alone.  Patient attends outpatient dialysis at the [facility]  Patient transports to and from dialysis by PATS.  Will follow patient's progress and therapy evaluations.  Patient may need SNF placement and this has been addressed in the past.        Discharge Plan    No documentation.       Continued Care and Services - Admitted Since 10/4/2021    Coordination has not been started for this encounter.     Selected Continued Care - Prior Encounters Includes selections from prior encounters from  7/6/2021 to 10/5/2021    Discharged on 8/20/2021 Admission date: 8/17/2021 - Discharge disposition: Home or Self Care    Durable Medical Equipment     Service Provider Selected Services Address Phone Fax Patient Preferred    [facility]  Durable Medical Equipment [address], [place] [address] [phone] --                      Demographic Summary    No documentation.       Functional Status    No documentation.       Psychosocial    No documentation.       Abuse/Neglect    No documentation.       Legal    No documentation.       Substance Abuse    No documentation.       Patient Forms    No documentation.           DUSTIN CampbellW

## 2021-10-06 PROBLEM — E87.1 HYPONATREMIA: Status: ACTIVE | Noted: 2021-01-01
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
## 2021-10-06 NOTE — PLAN OF CARE
Goal Outcome Evaluation:  Plan of Care Reviewed With: patient, other (see comments) (KIRSTIN Worley)        Progress: no change (Initial Evaluation)       Clinical bedside swallow evaluation completed. The patient was lethargic and poorly cooperative.     Primary problem: Altered mental status.   Medical history: CKD, DM, HTN, hypothyroidism, obesity, tremors, arthritis, anxiety.     Oral motor assessment unable to be completed due to inability to follow commands. Max verbal and tactile cues provided to maintain minimal alertness. The patient completed 1x puree trial and several trials of thin liquids. Initially would not swallow puree trial even given max cues Thin liquid wash was provided. Patient swallowed with no overt s/s of aspiration. Thin liquid trials completed with no overt s/s of aspiration and timely swallow. Due to level of alertness no further PO was given due to high risk for aspiration.     SLP recommends:   1) Remain NPO  2) OK for sips of water and ice with RN when alert   3) OK for meds crushed in apple sauce only if necessary and if alert (RN spoke with MD and he is planning on changing all PO meds to IV, this would be the safest option at this time due to level of alertness. Appreciate the foresight.)     SLP will follow up. Hopefully can advance diet as cognition improves.     Amy Guzman MS CCC-SLP 10/6/2021 10:45 CDT

## 2021-10-06 NOTE — PLAN OF CARE
Goal Outcome Evaluation:  Plan of Care Reviewed With: patient        Progress: improving   Ms Won was able to wake up and talk to Dr Merrill this morning.  She did not pass her speech evaluation today.  Cardene is still infusing, lopressor and vasotec added to her med list in an effort to control her blood pressure.  No urine out.  Plans for tomorrow include CT of abdomen and pelvis with contrast followed by dialysis to help remove the contrast.  PT and OT consulted, evaluations have yet to be completed.  Palliative feels she would benefit from cognitive evaluation as well.

## 2021-10-06 NOTE — THERAPY EVALUATION
Acute Care - Speech Language Pathology   Swallow Initial Evaluation [facility]     Patient Name: Ena Upton  : 1970  MRN: [id number]  Today's Date: 10/6/2021               Admit Date: 10/4/2021    Clinical bedside swallow evaluation completed. The patient was lethargic and poorly cooperative.     Primary problem: Altered mental status.   Medical history: CKD, DM, HTN, hypothyroidism, obesity, tremors, arthritis, anxiety.     Oral motor assessment unable to be completed due to inability to follow commands. Max verbal and tactile cues provided to maintain minimal alertness. The patient completed 1x puree trial and several trials of thin liquids. Initially would not swallow puree trial even given max cues Thin liquid wash was provided. Patient swallowed with no overt s/s of aspiration. Thin liquid trials completed with no overt s/s of aspiration and timely swallow. Due to level of alertness no further PO was given due to high risk for aspiration.     SLP recommends:   1) Remain NPO  2) OK for sips of water and ice with RN when alert   3) OK for meds crushed in apple sauce only if necessary and if alert (RN spoke with MD and he is planning on changing all PO meds to IV, this would be the safest option at this time due to level of alertness. Appreciate the foresight.)     SLP will follow up. Hopefully can advance diet as cognition improves.   Amy Guzman, MS CCC-SLP 10/6/2021 10:47 CDT    Visit Dx:     ICD-10-CM ICD-9-CM   1. Hyperosmolar hyperglycemic state (HHS) (HCC)  E11.00 250.22    E11.65    2. Altered mental status, unspecified altered mental status type  R41.82 780.97   3. Pneumonia due to infectious organism, unspecified laterality, unspecified part of lung  J18.9 486   4. 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)   • Non-ketotic hyperosmolar coma (HCC)   • HTN (hypertension)   • Intractable  nausea and vomiting   • Constipation   • Gastroparesis due to DM (HCC)   • Hyperglycemia   • Seizure (HCC)   • End stage kidney disease (HCC)   • Sacral insufficiency fracture   • Hypertensive urgency   • Uncontrolled diabetes mellitus (HCC)   • Hyperkalemia   • Altered mental status   • Troponin level elevated   • Hypertensive emergency   • Acute pulmonary edema (HCC)   • Hypothyroidism   • Hyperosmolar hyperglycemic state (HHS) (HCC)   • Uremic encephalopathy syndrome   • Hyponatremia     Past Medical History:   Diagnosis Date   • Anxiety    • Arthritis    • Chronic kidney disease     STAGE V RENAL FAILURE   • Depression    • Diabetes mellitus (HCC)    • HTN (hypertension)    • Hypothyroidism    • Obesity    • Tremors of nervous system      Past Surgical History:   Procedure Laterality Date   • ARTERIOVENOUS FISTULA     • BREAST BIOPSY     • CARPAL TUNNEL RELEASE     • CATH LAB PROCEDURE     • CHOLECYSTECTOMY     • TUBAL ABDOMINAL LIGATION         SLP Recommendation and Plan  SLP Swallowing Diagnosis: moderate, oral dysphagia, R/O pharyngeal dysphagia (10/06/21 0950)  SLP Diet Recommendation: water between meals after oral care, with supervision, ice chips between meals after oral care, with supervision, NPO (10/06/21 0950)  Recommended Precautions and Strategies: upright posture during/after eating, small bites of food and sips of liquid, general aspiration precautions, fatigue precautions, 1:1 supervision (10/06/21 0950)  SLP Rec. for Method of Medication Administration: meds crushed, with pudding or applesauce, as tolerated (10/06/21 0950)     Monitor for Signs of Aspiration: yes, notify SLP if any concerns (10/06/21 0950)  Recommended Diagnostics: reassess via clinical swallow evaluation (10/06/21 0950)  Swallow Criteria for Skilled Therapeutic Interventions Met: demonstrates skilled criteria (10/06/21 0950)  Anticipated Discharge Disposition (SLP): unknown (10/06/21 0950)  Rehab Potential/Prognosis,  Swallowing: adequate, monitor progress closely (10/06/21 0950)  Therapy Frequency (Swallow): at least, 2 days per week (10/06/21 0950)  Predicted Duration Therapy Intervention (Days): until discharge (10/06/21 0950)                         Plan of Care Reviewed With: patient, other (see comments) (KIRSTIN Worley)  Progress: no change (Initial Evaluation)         SWALLOW EVALUATION (last 72 hours)      SLP Adult Swallow Evaluation     Row Name 10/06/21 0950                   Rehab Evaluation    Document Type  evaluation  -MM        Subjective Information  no complaints  -MM        Patient Observations  lethargic;poorly cooperative  -MM        Patient/Family/Caregiver Comments/Observations  No family present.   -MM        Care Plan Review  evaluation/treatment results reviewed;current/potential barriers reviewed;patient/other agree to care plan  -MM        Care Plan Review, Other Participant(s)  other (see comments) KIRSTIN Worley   -MM        Patient Effort  poor  -MM        Symptoms Noted During/After Treatment  none  -MM           General Information    Patient Profile Reviewed  yes  -MM        Pertinent History Of Current Problem  Primary problem: Altered mental status. Medical history: CKD, DM, HTN, hypothyroidism, obesity, tremors, arthritis, anxiety.   -MM        Current Method of Nutrition  NPO  -MM        Precautions/Limitations, Vision  other (see comments) eyes remained closed   -MM        Precautions/Limitations, Hearing  other (see comments) Difficulty following commands  -MM        Prior Level of Function-Communication  unknown  -MM        Prior Level of Function-Swallowing  unknown  -MM        Plans/Goals Discussed with  patient;other (see comments);agreed upon RN   -MM        Barriers to Rehab  cognitive status  -MM        Patient's Goals for Discharge  patient could not state  -MM           Pain    Additional Documentation  Pain Scale: FACES Pre/Post-Treatment (Group)  -MM           Pain Scale: FACES  Pre/Post-Treatment    Pain: FACES Scale, Pretreatment  0-->no hurt  -MM        Posttreatment Pain Rating  0-->no hurt  -MM           Oral Motor Structure and Function    Dentition Assessment  natural, present and adequate;poor oral hygiene  -MM        Secretion Management  dried secretions in oral cavity  -MM        Mucosal Quality  dry;sticky  -MM        Volitional Swallow  unable to elicit  -MM        Volitional Cough  unable to elicit  -MM           Oral Musculature and Cranial Nerve Assessment    Oral Motor General Assessment  other (see comments)  -MM        Oral Motor, Comment  Unable to fully assess  -MM           General Eating/Swallowing Observations    Respiratory Support Currently in Use  nasal cannula  -MM        Eating/Swallowing Skills  fed by SLP  -MM        Positioning During Eating  upright in bed  -MM        Utensils Used  spoon;straw  -MM        Consistencies Trialed  pureed;thin liquids  -MM           Clinical Swallow Eval    Oral Prep Phase  impaired  -MM        Oral Transit  impaired  -MM        Oral Residue  impaired  -MM        Pharyngeal Phase  no overt signs/symptoms of pharyngeal impairment  -MM        Esophageal Phase  unremarkable  -MM        Clinical Swallow Evaluation Summary  See note  -MM           Oral Prep Concerns    Oral Prep Concerns  oral holding  -MM        Oral Holding  pudding  -MM           Oral Transit Concerns    Oral Transit Concerns  delayed initiation of bolus transit  -MM        Delayed Intiation of Bolus Transit  pudding  -MM           Oral Residue Concerns    Oral Residue Concerns  diffuse residue throughout oral cavity  -MM        Diffuse Residue Throughout Oral Cavity  pudding  -MM           Clinical Impression    Barriers to Overall Progress (SLP)  Cognition   -MM        SLP Swallowing Diagnosis  moderate;oral dysphagia;R/O pharyngeal dysphagia  -MM        Functional Impact  risk of aspiration/pneumonia;risk of malnutrition;risk of dehydration  -MM        Rehab  Potential/Prognosis, Swallowing  adequate, monitor progress closely  -MM        Swallow Criteria for Skilled Therapeutic Interventions Met  demonstrates skilled criteria  -MM           Recommendations    Therapy Frequency (Swallow)  at least;2 days per week  -MM        Predicted Duration Therapy Intervention (Days)  until discharge  -MM        SLP Diet Recommendation  water between meals after oral care, with supervision;ice chips between meals after oral care, with supervision;NPO  -MM        Recommended Diagnostics  reassess via clinical swallow evaluation  -MM        Recommended Precautions and Strategies  upright posture during/after eating;small bites of food and sips of liquid;general aspiration precautions;fatigue precautions;1:1 supervision  -MM        Oral Care Recommendations  Oral Care BID/PRN;Suction toothbrush  -MM        SLP Rec. for Method of Medication Administration  meds crushed;with pudding or applesauce;as tolerated  -MM        Monitor for Signs of Aspiration  yes;notify SLP if any concerns  -MM        Anticipated Discharge Disposition (SLP)  unknown  -MM           Swallow Goals (SLP)    Oral Nutrition/Hydration Goal Selection (SLP)  oral nutrition/hydration, SLP goal 1  -MM           Oral Nutrition/Hydration Goal 1 (SLP)    Oral Nutrition/Hydration Goal 1, SLP  Patient will tolerate LRD without s/s of aspiration.  -MM        Time Frame (Oral Nutrition/Hydration Goal 1, SLP)  by discharge  -MM        Barriers (Oral Nutrition/Hydration Goal 1, SLP)  cognition   -MM        Progress/Outcomes (Oral Nutrition/Hydration Goal 1, SLP)  goal ongoing  -MM          User Key  (r) = Recorded By, (t) = Taken By, (c) = Cosigned By    Initials Name Effective Dates    Amy Oviedo, MS CCC-SLP 06/16/21 -           EDUCATION  The patient has been educated in the following areas:   Dysphagia (Swallowing Impairment) Oral Care/Hydration NPO rationale.       SLP GOALS     Row Name 10/06/21 0998              Oral Nutrition/Hydration Goal 1 (SLP)    Oral Nutrition/Hydration Goal 1, SLP  Patient will tolerate LRD without s/s of aspiration.  -MM      Time Frame (Oral Nutrition/Hydration Goal 1, SLP)  by discharge  -MM      Barriers (Oral Nutrition/Hydration Goal 1, SLP)  cognition   -MM      Progress/Outcomes (Oral Nutrition/Hydration Goal 1, SLP)  goal ongoing  -MM        User Key  (r) = Recorded By, (t) = Taken By, (c) = Cosigned By    Initials Name Provider Type    Amy Oviedo MS CCC-SLP Speech and Language Pathologist             Time Calculation:   Time Calculation- SLP     Row Name 10/06/21 1046             Time Calculation- SLP    SLP Start Time  0950  -MM      SLP Stop Time  1046  -MM      SLP Time Calculation (min)  56 min  -MM      SLP Received On  10/06/21  -MM      SLP Goal Re-Cert Due Date  10/16/21  -MM         Untimed Charges    SLP Eval/Re-eval   ST Eval Oral Pharyng Swallow - 68260  -MM      [id number] Eval Oral Pharyng Swallow Minutes  56  -MM         Total Minutes    Untimed Charges Total Minutes  56  -MM       Total Minutes  56  -MM        User Key  (r) = Recorded By, (t) = Taken By, (c) = Cosigned By    Initials Name Provider Type    Amy Oviedo MS CCC-SLP Speech and Language Pathologist          Therapy Charges for Today     Code Description Service Date Service Provider Modifiers Qty    [id number]  ST EVAL ORAL PHARYNG SWALLOW 4 10/6/2021 Amy Guzman MS CCC-SLP GN 1               Aym Guzman MS CCC-REI  10/6/2021

## 2021-10-06 NOTE — PROGRESS NOTES
Nephrology ([facility]) Progress Note      Patient:  Ena Upton  YOB: 1970  Date of Service: 10/6/2021  MRN: [id number]   Acct: [id number]   Primary Care Physician: Yaron Wiggins APRN  Advance Directive:   Code Status and Medical Interventions:   Ordered at: 10/05/21 1107     Level Of Support Discussed With:    Next of Kin (If No Surrogate)     Code Status:    No CPR     Medical Interventions (Level of Support Prior to Arrest):    Full     Comments:    sister-Pat     Admit Date: 10/4/2021       Hospital Day: 1  Referring Provider: No ref. provider found      Patient personally seen and examined.  Complete chart including Consults, Notes, Operative Reports, Labs, Cardiology, and Radiology studies reviewed as able.        Subjective:  Ena Upton is a 51 y.o. female  whom we were consulted for end stage renal disease. Maintenance hemodialysis Tuesday Thursday Saturday at [facility]. Unknown last time she went to HD.  History of type 2 diabetes, hypertension, hypothyroidism.  Frequent hospitalizations due to noncompliance with dialysis and her other medical treatments. Was just discharged from [facility] a few weeks ago.  Presented this time with altered mental status, initial blood glucose was >1500. No other history of recent events is known. Hospital course remarkable for improving mental status with correction of hyperglycemia. Tolerated HD well on 10/05.    Today is awake, oriented X 2. On Cardene drip.    Allergies:  Penicillins, Lamisil [terbinafine], Reglan [metoclopramide], and Stadol [butorphanol]    Home Meds:  Medications Prior to Admission   Medication Sig Dispense Refill Last Dose   • albuterol (PROVENTIL HFA;VENTOLIN HFA) 108 (90 BASE) MCG/ACT inhaler Inhale 2 puffs Every 4 (Four) Hours As Needed for wheezing.      • carvedilol (COREG) 25 MG tablet Take 1 tablet by mouth 2 (Two) Times a Day With Meals. 60 tablet 3    • cloNIDine (CATAPRES)  0.3 MG tablet Take 1 tablet by mouth Every 8 (Eight) Hours. 90 tablet 3    • DULoxetine (CYMBALTA) 60 MG capsule Take 60 mg by mouth daily.      • hydrALAZINE (APRESOLINE) 50 MG tablet Take 1.5 tablets by mouth 3 (Three) Times a Day. 120 tablet 3    • insulin aspart (novoLOG) 100 UNIT/ML injection Inject 10 Units under the skin into the appropriate area as directed 3 (Three) Times a Day Before Meals.      • Insulin Glargine (BASAGLAR KWIKPEN) 100 UNIT/ML injection pen Inject 40 Units under the skin into the appropriate area as directed Every Night.      • lactulose (CHRONULAC) 10 GM/15ML solution Take 20 g by mouth 3 (Three) Times a Day.      • levothyroxine (SYNTHROID, LEVOTHROID) 50 MCG tablet Take 1 tablet by mouth Daily. 30 tablet 3    • lisinopril (PRINIVIL,ZESTRIL) 40 MG tablet Take 1 tablet by mouth Daily. 30 tablet 3    • NIFEdipine XL (PROCARDIA XL) 60 MG 24 hr tablet Take 60 mg by mouth 2 (two) times a day.      • ondansetron (ZOFRAN) 4 MG tablet Take 4 mg by mouth Every 8 (Eight) Hours As Needed for nausea or vomiting.      • pregabalin (LYRICA) 75 MG capsule Take 75 mg by mouth 2 (Two) Times a Day.      • rOPINIRole (REQUIP) 4 MG tablet Take 4 mg by mouth Every Night.      • simvastatin (ZOCOR) 20 MG tablet Take 40 mg by mouth Daily.          Medicines:  Current Facility-Administered Medications   Medication Dose Route Frequency Provider Last Rate Last Admin   • dextrose (D50W) 25 g/ 50mL Intravenous Solution 12.5 g  12.5 g Intravenous PRN Ken Moon MD   12.5 g at 10/05/21 2484   • dextrose (D50W) 25 g/ 50mL Intravenous Solution 25 g  25 g Intravenous Q15 Min PRN Ken Moon MD       • dextrose (D5W) 5 % infusion  50 mL/hr Intravenous Continuous Ken Moon MD 50 mL/hr at 10/06/21 0053 50 mL/hr at 10/06/21 0053   • dextrose (GLUTOSE) oral gel 15 g  15 g Oral Q15 Min PRN Ken Moon, MD       • enoxaparin (LOVENOX) syringe 30 mg  30 mg Subcutaneous  Nightly Ken Moon MD   30 mg at 10/05/21 2113   • famotidine (PEPCID) injection 20 mg  20 mg Intravenous Daily Ken Moon MD   20 mg at 10/05/21 1641   • glucagon (human recombinant) (GLUCAGEN DIAGNOSTIC) injection 1 mg  1 mg Subcutaneous Q15 Min PRN Ken Moon MD       • insulin detemir (LEVEMIR) injection 5 Units  5 Units Subcutaneous Nightly Ken Moon MD   5 Units at 10/06/21 0058   • insulin lispro (humaLOG) injection 0-9 Units  0-9 Units Subcutaneous Q6H Ken Moon MD       • ipratropium-albuterol (DUO-NEB) nebulizer solution 3 mL  3 mL Nebulization Q6H - RT Ken Moon MD   3 mL at 10/06/21 0641   • ipratropium-albuterol (DUO-NEB) nebulizer solution 3 mL  3 mL Nebulization Q6H PRN Ken Moon MD       • levothyroxine (SYNTHROID, LEVOTHROID) tablet 50 mcg  50 mcg Oral Q AM Ken Moon MD       • metoprolol tartrate (LOPRESSOR) injection 2.5 mg  2.5 mg Intravenous Q6H Ken Moon MD   2.5 mg at 10/06/21 0523   • niCARdipine (CARDENE) 25 mg in 250 mL NS infusion  5-15 mg/hr Intravenous Titrated Omar Akins MD 75 mL/hr at 10/06/21 0555 7.5 mg/hr at 10/06/21 0555   • ondansetron (ZOFRAN) injection 4 mg  4 mg Intravenous Q6H PRN Ken Moon MD       • sodium chloride 0.9 % flush 10 mL  10 mL Intravenous PRN eKn Moon MD       • sodium chloride 0.9 % flush 10 mL  10 mL Intravenous Once PRN Ken Moon MD       • sodium chloride 0.9 % flush 10 mL  10 mL Intravenous Q12H Ken Moon MD   10 mL at 10/05/21 2113   • sodium chloride 0.9 % flush 10 mL  10 mL Intravenous PRN Ken Moon MD       • sodium chloride 0.9 % flush 3 mL  3 mL Intravenous Q12H Ken Moon MD   3 mL at 10/05/21 2112   • sodium chloride 0.9 % infusion  10 mL/hr Intravenous Continuous PRN Ken Moon MD           Past Medical  History:  Past Medical History:   Diagnosis Date   • Anxiety    • Arthritis    • Chronic kidney disease     STAGE V RENAL FAILURE   • Depression    • Diabetes mellitus (CMS/HCC)    • HTN (hypertension)    • Hypothyroidism    • Obesity    • Tremors of nervous system        Past Surgical History:  Past Surgical History:   Procedure Laterality Date   • ARTERIOVENOUS FISTULA     • BREAST BIOPSY     • CARPAL TUNNEL RELEASE     • CATH LAB PROCEDURE     • CHOLECYSTECTOMY     • TUBAL ABDOMINAL LIGATION         Family History  Family History   Problem Relation Age of Onset   • Cancer Other    • Hypertension Other    • Kidney disease Other    • Heart disease Other    • Diabetes Other    • Diabetes Mother    • Diabetes Maternal Aunt    • Diabetes Maternal Grandmother        Social History  Social History     Socioeconomic History   • Marital status:      Spouse name: Not on file   • Number of children: Not on file   • Years of education: Not on file   • Highest education level: Not on file   Tobacco Use   • Smoking status: Current Every Day Smoker     Packs/day: 0.50   • Smokeless tobacco: Never Used   • Tobacco comment: CUTTING BACK AND TRYING TO STOP SMOKING   Substance and Sexual Activity   • Alcohol use: No   • Drug use: No   • Sexual activity: Defer         Review of Systems:  History obtained from chart review and the patient  General ROS: No fever or chills  Respiratory ROS: No cough, shortness of breath, wheezing  Cardiovascular ROS: No chest pain or palpitations  Gastrointestinal ROS: No abdominal pain or melena  Genito-Urinary ROS: No dysuria or hematuria  Psych ROS: No anxiety and depression    Objective:  Patient Vitals for the past 24 hrs:   BP Temp Temp src Pulse Resp SpO2 Weight   10/06/21 0648 158/55 -- -- 79 -- 100 % --   10/06/21 0641 -- -- -- 79 16 100 % --   10/06/21 0523 143/56 -- -- 86 -- -- 74.3 kg (163 lb 11.2 oz)   10/06/21 0500 139/60 -- -- 87 -- 99 % --   10/06/21 0400 145/55 -- -- 81 -- 100  % --   10/06/21 0200 148/55 -- -- 88 -- 98 % --   10/06/21 0100 120/77 -- -- 89 -- 99 % --   10/06/21 0055 145/59 -- -- 92 -- -- --   10/06/21 0007 -- -- -- -- 19 -- --   10/06/21 0002 -- -- -- 94 15 99 % --   10/06/21 0000 127/85 98.8 °F (37.1 °C) Axillary 95 -- 100 % --   10/05/21 2300 147/53 -- -- 96 -- 99 % --   10/05/21 2200 143/82 99.4 °F (37.4 °C) Axillary 96 -- 99 % --   10/05/21 2100 149/57 99.5 °F (37.5 °C) Axillary 96 -- 99 % --   10/05/21 2000 143/68 -- -- 98 -- 99 % --   10/05/21 1936 -- -- -- 98 28 100 % --   10/05/21 1930 -- -- -- 98 (!) 29 99 % --   10/05/21 1800 172/63 -- -- 100 26 100 % --   10/05/21 1700 (!) 189/60 -- -- 99 24 98 % --   10/05/21 1600 153/69 99 °F (37.2 °C) Axillary 96 27 98 % --   10/05/21 1500 (!) 156/114 -- -- 93 26 98 % --   10/05/21 1400 149/60 -- -- 92 (!) 29 98 % --   10/05/21 1300 139/67 -- -- 88 (!) 30 97 % --   10/05/21 1200 134/58 99.1 °F (37.3 °C) Axillary 84 (!) 36 96 % --   10/05/21 1138 -- -- -- 85 (!) 35 96 % --   10/05/21 1133 -- -- -- 85 (!) 31 95 % --   10/05/21 1100 142/58 -- -- 85 (!) 32 91 % --   10/05/21 1000 136/54 -- -- 87 (!) 29 91 % --   10/05/21 0900 133/56 -- -- 88 27 94 % --       Intake/Output Summary (Last 24 hours) at 10/6/2021 0859  Last data filed at 10/6/2021 0400  Gross per 24 hour   Intake 3169.25 ml   Output 0 ml   Net 3169.25 ml     General: awake/alert   Chest:  clear to auscultation bilaterally without respiratory distress  CVS: regular rate and rhythm  Abdominal: soft, nontender, positive bowel sounds  Extremities: no cyanosis or edema  Skin: warm and dry without rash      Labs:  Results from last 7 days   Lab Units 10/05/21  2309 10/05/21  0422 10/05/21  0225   WBC 10*3/mm3 10.03 7.78 7.28   HEMOGLOBIN g/dL 10.6* 9.9* 10.4*   HEMATOCRIT % 30.5* 30.6* 32.7*   PLATELETS 10*3/mm3 125* 106* 101*         Results from last 7 days   Lab Units 10/05/21  2309 10/05/21  2029 10/05/21  1805 10/05/21  0224 10/04/21  2344   SODIUM mmol/L 128* 129*  127*   < > 104*   POTASSIUM mmol/L 3.6 3.5 3.5   < > 4.8   CHLORIDE mmol/L 93* 93* 91*   < > 67*   CO2 mmol/L 23.0 23.0 23.0   < > 17.0*   BUN mg/dL 18 18 17   < > 45*   CREATININE mg/dL 2.88* 2.84* 2.62*   < > 4.87*   CALCIUM mg/dL 8.4* 8.1* 8.2*   < > 8.4*   BILIRUBIN mg/dL  --   --   --   --  0.6   ALK PHOS U/L  --   --   --   --  167*   ALT (SGPT) U/L  --   --   --   --  7   AST (SGOT) U/L  --   --   --   --  15   GLUCOSE mg/dL 99 106* 179*   < > >1,500*    < > = values in this interval not displayed.       Radiology:   Imaging Results (Last 72 Hours)     Procedure Component Value Units Date/Time    CT Head Without Contrast [[id number]] Collected: 10/05/21 1911     Updated: 10/05/21 1917    Narrative:      EXAMINATION:   CT HEAD WO CONTRAST-  10/5/2021 7:11 PM CDT     HISTORY: CT BRAIN without contrast dated 10/5/2021 6:51 PM CDT     HISTORY: Altered mental status     COMPARISON: September 11, 2021      DOSE LENGTH PRODUCT: 599 mGy cm     In order to have a CT radiation dose as low as reasonably achievable,  Automated Exposure Control was utilized for adjustment of the mA and/or  KV according to patient size.     TECHNIQUE: Serial axial tomographic images of the brain were obtained  without the use of intravenous contrast.      FINDINGS:   The midline structures are nondisplaced. The ventricles and basilar  cisterns are normal in size and configuration. There is no evidence of  intracranial hemorrhage or mass-effect. The gray-white matter  differentiation is maintained. The sulci have a normal configuration,  and there are no abnormal extra-axial fluid collections. The structures  of the posterior fossa are unremarkable.      The included orbits and their contents are unremarkable. The visualized  paranasal sinuses, mastoid air cells and middle ear cavities are clear.  The visualized osseous structures and overlying soft tissues of the  skull and face are unremarkable.        Impression:      1. Some image  degradation is present due to patient motion however there  is no obvious acute intracranial abnormality.        This report was finalized on 10/05/2021 19:13 by Dr. Lazaro Martinez MD.    US Pelvis Complete [[id number]] Collected: 10/05/21 1310     Updated: 10/05/21 1318    Narrative:      EXAM: US PELVIS COMPLETE- - 10/5/2021 12:45 PM CDT     HISTORY: elevated hcg, special attention to ovaries, r/o mass/tumor.;  E11.00-Type 2 diabetes mellitus with hyperosmolarity without nonketotic  hyperglycemic-hyperosmolar coma ([facility]); E11.65-Type 2 diabetes mellitus  with hyperglycemia; R41.82-Altered mental status, unspecified;  J18.9-Pneumonia, unspecified organism       COMPARISON: 6/19/2021, and 10/18/2016     TECHNIQUE: Real time gray scale, color and spectral Doppler ultrasound  performed with representative images saved to PACS. Exam discussed in  person with the technologist.     FINDINGS:  UTERUS. There appears to be a small uterus measuring 3.9 x 1.7 cm on  transabdominal imaging. Transvaginal imaging performed as the patient  was not able to consent due to exam according to technologist.     Page catheter in a normal-appearing urinary bladder.     RIGHT OVARY. Not visualized     LEFT OVARY. Not visualized     No large volume free fluid in the pelvis.          Impression:      1. Atrophic appearing uterus on transabdominal imaging. Limited exam on  this ICU patient.  2. Ovaries not demonstrated.  3. Normal appearance of the urinary bladder.  This report was finalized on 10/05/2021 13:15 by Dr Brenda Coker MD.    XR Chest 1 View [[id number]] Collected: 10/05/21 0712     Updated: 10/05/21 0717    Narrative:      EXAMINATION: Chest 1 view 10/4/2021     HISTORY: Altered mental status     FINDINGS: Upright frontal projection of chest is compared to previous  study of 9/16/2021. There are persistent diffuse increased interstitial  markings of the lungs as well as mild cardiomegaly. Left atrial  enlargement is suspected  with splaying of the mainstem bronchi. A right  basilar infiltrate is present. There is no effusion or free air present.       Impression:      1.. Cardiomegaly. There is left atrial enlargement.  2. Increased interstitial markings which appear to be somewhat chronic  in nature. There is more confluent disease in the right lung base  suspicious for pneumonia.  This report was finalized on 10/05/2021 07:14 by Dr. Carlos Alberto Koehler MD.          Culture:  Blood Culture   Date Value Ref Range Status   10/05/2021 No growth at 24 hours  Preliminary   10/04/2021 No growth at 24 hours  Preliminary         Assessment   1.  End stage renal disease on HD TTS  2.  Type 2 diabetes  3.  Hypertension  4.  Hyperosmolar hyperglycemic state--improved  5.  Hyponatremia due to volume overload  6.  Metabolic acidosis--improved  7.  Metabolic encephalopathy--improving  8.  Anemia of CKD    Plan:  1.  Dialysis next due 10/07  2.  Serum sodium remains low secondary to large volume of IV fluids infused since admission.  3.  Monitor labs      Delmar Salazar, GAEL  10/6/2021  08:59 CDT

## 2021-10-06 NOTE — PROGRESS NOTES
[facility]  INPATIENT PROGRESS NOTE    Patient Name: Ena Upton  Date of Admission: 10/4/2021  Today's Date: 10/06/21  Length of Stay: 1  Primary Care Physician: Yaron Wiggins, GAEL    Subjective   Chief Complaint: f/u Encephalopathy/HHS    HPI   Patient is much more awake and interactive this morning.  She opens her eyes and talks to me appropriately.  Denies any complaints.  Denies chest pain or shortness of breath.  No nausea.  Following commands and moving all extremities.  Converted off insulin drip this morning.  Remains on a Cardene drip.        Review of Systems   All pertinent negatives and positives are as above. All other systems have been reviewed and are negative unless otherwise stated.     Objective    Temp:  [98.8 °F (37.1 °C)-99.5 °F (37.5 °C)] 98.8 °F (37.1 °C)  Heart Rate:  [] 79  Resp:  [15-36] 16  BP: (120-189)/() 158/55  Physical Exam  GEN: Awake, alert, interactive, in NAD  HEENT: PERRLA, EOMI, Anicteric, Trachea midline  Lungs:  no wheezing/rales/rhonchi  Heart: RRR, +S1/s2, no rub  ABD: soft, nt/nd, +BS, no guarding/rebound  Extremities: atraumatic, no cyanosis  Skin: no rashes or lesions  Neuro: AAOx2-3, no focal deficits  Psych: normal mood & affect        Results Review:  I have reviewed the labs, radiology results, and diagnostic studies.    Laboratory Data:   Results from last 7 days   Lab Units 10/05/21  2309 10/05/21  0422 10/05/21  0225   WBC 10*3/mm3 10.03 7.78 7.28   HEMOGLOBIN g/dL 10.6* 9.9* 10.4*   HEMATOCRIT % 30.5* 30.6* 32.7*   PLATELETS 10*3/mm3 125* 106* 101*        Results from last 7 days   Lab Units 10/05/21  2309 10/05/21  2029 10/05/21  1805 10/05/21  0224 10/04/21  2344   SODIUM mmol/L 128* 129* 127*   < > 104*   POTASSIUM mmol/L 3.6 3.5 3.5   < > 4.8   CHLORIDE mmol/L 93* 93* 91*   < > 67*   CO2 mmol/L 23.0 23.0 23.0   < > 17.0*   BUN mg/dL 18 18 17   < > 45*   CREATININE mg/dL 2.88* 2.84*  2.62*   < > 4.87*   CALCIUM mg/dL 8.4* 8.1* 8.2*   < > 8.4*   BILIRUBIN mg/dL  --   --   --   --  0.6   ALK PHOS U/L  --   --   --   --  167*   ALT (SGPT) U/L  --   --   --   --  7   AST (SGOT) U/L  --   --   --   --  15   GLUCOSE mg/dL 99 106* 179*   < > >1,500*    < > = values in this interval not displayed.       Culture Data:   Blood Culture   Date Value Ref Range Status   10/05/2021 No growth at 24 hours  Preliminary   10/04/2021 No growth at 24 hours  Preliminary       Radiology Data:   Imaging Results (Last 24 Hours)     Procedure Component Value Units Date/Time    CT Head Without Contrast [[id number]] Collected: 10/05/21 1911     Updated: 10/05/21 1917    Narrative:      EXAMINATION:   CT HEAD WO CONTRAST-  10/5/2021 7:11 PM CDT     HISTORY: CT BRAIN without contrast dated 10/5/2021 6:51 PM CDT     HISTORY: Altered mental status     COMPARISON: September 11, 2021      DOSE LENGTH PRODUCT: 599 mGy cm     In order to have a CT radiation dose as low as reasonably achievable,  Automated Exposure Control was utilized for adjustment of the mA and/or  KV according to patient size.     TECHNIQUE: Serial axial tomographic images of the brain were obtained  without the use of intravenous contrast.      FINDINGS:   The midline structures are nondisplaced. The ventricles and basilar  cisterns are normal in size and configuration. There is no evidence of  intracranial hemorrhage or mass-effect. The gray-white matter  differentiation is maintained. The sulci have a normal configuration,  and there are no abnormal extra-axial fluid collections. The structures  of the posterior fossa are unremarkable.      The included orbits and their contents are unremarkable. The visualized  paranasal sinuses, mastoid air cells and middle ear cavities are clear.  The visualized osseous structures and overlying soft tissues of the  skull and face are unremarkable.        Impression:      1. Some image degradation is present due to patient  motion however there  is no obvious acute intracranial abnormality.        This report was finalized on 10/05/2021 19:13 by Dr. Lazaro Martinez MD.    US Pelvis Complete [[id number]] Collected: 10/05/21 1310     Updated: 10/05/21 1318    Narrative:      EXAM: US PELVIS COMPLETE- - 10/5/2021 12:45 PM CDT     HISTORY: elevated hcg, special attention to ovaries, r/o mass/tumor.;  E11.00-Type 2 diabetes mellitus with hyperosmolarity without nonketotic  hyperglycemic-hyperosmolar coma ([facility]); E11.65-Type 2 diabetes mellitus  with hyperglycemia; R41.82-Altered mental status, unspecified;  J18.9-Pneumonia, unspecified organism       COMPARISON: 6/19/2021, and 10/18/2016     TECHNIQUE: Real time gray scale, color and spectral Doppler ultrasound  performed with representative images saved to PACS. Exam discussed in  person with the technologist.     FINDINGS:  UTERUS. There appears to be a small uterus measuring 3.9 x 1.7 cm on  transabdominal imaging. Transvaginal imaging performed as the patient  was not able to consent due to exam according to technologist.     Page catheter in a normal-appearing urinary bladder.     RIGHT OVARY. Not visualized     LEFT OVARY. Not visualized     No large volume free fluid in the pelvis.          Impression:      1. Atrophic appearing uterus on transabdominal imaging. Limited exam on  this ICU patient.  2. Ovaries not demonstrated.  3. Normal appearance of the urinary bladder.  This report was finalized on 10/05/2021 13:15 by Dr Brenda Coker MD.          I have reviewed the patient's current medications.     Assessment/Plan     Active Hospital Problems    Diagnosis    • **Hyperosmolar hyperglycemic state (HHS) (HCC)    • Hyponatremia    • Uremic encephalopathy syndrome    • Hypertensive emergency    • Hypothyroidism    • End stage kidney disease (HCC)        #1  HHS -resolved and now off insulin drip.  Converted to left me this morning.  Will get a quick speech evaluation and something  to eat.  Continue sliding scale and hypoglycemia protocol.    #2  Encephalopathy -presented altered in the setting of HHS and missed dialysis.  Also potentially in the setting of hypertension.  Suspect combination of these 3 things.  Doing much better today.  Head CT yesterday without any obvious acute issues.  Continue supportive care.  Continue to treat blood pressure, sugars, kidneys.  Alysis per nephro.    #3 ESRD -dialyzed yesterday.  Plans for next dialysis tomorrow.    #4 hypertensive emergency -remains on Cardene drip.  But she was more confused and nonverbal prior.  More awake this morning.  We will get a speech eval start p.o. blood pressure meds to wean off Cardene drip.  She is on multiple meds at home but again not very compliant so we will start back slow and increase as necessary.    #5+ hCG -patient had a faint positive urine hCG on arrival.  Quantitative was done and mildly elevated at 6.5.  Patient has a history of tubal ligation and states she is not sexually active.  Highly doubt pregnancy.  A transvaginal ultrasound was ordered yesterday but patient was unable to consent to it and just had a pelvic done.  Showed an atrophic small uterus.  Unfortunately ovaries were not demonstrated on the transabdominal and that is the other potential concern as she could have a malignancy/hormone secreting tumor.  We will plan to just proceed with a CT abdomen and pelvis with IV contrast tomorrow prior to dialysis and then run dialysis to remove contrast after.  We will also repeat a quantitative hCG tomorrow to make sure it is not elevating.    #6 hyponatremia - likely some hypervolemic affect from fluids with DKA protocol. Will monitor and suspect will improve with HD tomorrow.     Discharge Planning: Continue in ICU today.  Wean off Cardene drip.  Continue current care.    Electronically signed by Adán Merrill DO, 10/06/21, 08:12 CDT.
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
## 2021-10-07 NOTE — THERAPY EVALUATION
Acute Care - Speech Language Pathology Initial Evaluation  [facility]     Patient Name: Ena Upton  : 1970  MRN: [id number]  Today's Date: 10/7/2021               Admit Date: 10/4/2021     The patient was seen today for an evaluation of her cognitive skills as well as a re-evaluation of her swallow function. She was alert and cooperative.     Cognitive Evaluation:    MMSE:  The Mini Mental State Examination (MMSE) is a tool that can be used to systematically and thoroughly assess mental status with older,  community dwelling, hospitalized and institutionalized adults. It is an 11-question measure that tests five areas of cognitive function:  orientation, registration, attention and calculation, recall, and language. The maximum score is 30. A score of 23 or lower is indicative of cognitive impairment.       Severity Rating Score Assessment and Function   Questionably Significant (25-30)  If clinical signs of cognitive impairment are present, in depth cognitive assessment may be valuable.  May have clinically significant but mild deficits in day to day functioning.  Likely to affect only most demanding activities of daily living.   Mild (20-25) 24 In depth assessment may be helpful to better determine pattern and extent of deficits.  Significant effect in day to day functioning.  May require supervision, support and assistance.   Moderate (10-20)  In depth assessment may be helpful if there are specific clinical indications.  Clear impairment in day to day functioning.  May require 24 hour supervision.   Severe (0-10)  Pt not likely to be able to participate in in-depth testing.  Marked impairment in day to day functioning.  Likely to require 24 hour supervision and assistance with ADLs.   Comments: The patient was able to state the year, season, and month. She was unsure what the date was but was able to tell me it was early October. She was unable to tell me the day of the week. She was able to state  "that we were in the [facility], [place], [place], and [facility]. She did not know what floor she was on. Able to recall 3/3 unrelated words immediately and with an imposed delay. Difficulty with spelling the word WORLD backwards; was able to closely complete with only 1 letter missing. She was able to ID 2 common objects during confrontational naming task. She was able to repeat a simple phrase. She was able to read and follow a simple command. Able to write a simple sentence which stated, \"please feed me.\" Difficulty with figure copying as she mary a 4 sided shape instead of a 5 sided shape with difficulty connecting sides appropriately. She was able to follow 2/3 parts of a complex command.     Score of 24/30 which is indicative of a mild cognitive impairment; however, feel patient actually would score higher if not acutely ill. SLP did score a zero on date even given her ability to state it was early October. Scored 0 on complex command as she was inattentive to all steps; however, do feel she is capable of completing multiple step commands as she showed evidence of this during swallow reevaluation. Her only noted concern was with figure drawing; she did tell me she normally wear glasses and these were not present. Patient reports she feels close to her baseline with only a few inconsistencies that she contributes to being in the hospital.       Swallowing Reevaluation:     Clinical bedside swallow evaluation completed on reevaluation basis. The patient was alert and cooperative. Much improved compared to yesterday when she would not alert well even given maximal verbal and tactile cues.     Oral motor assessment completed WFL. The patient was able to self feed appropriately. She completed a full range of consistencies except for mechanical soft. No overt s/s of aspiration were observed. Functional rotary chew given regular solids.     SLP recommends:  1) Regular diet   2) Thin liquids   3) Meds whole as tolerated "   4) Oral care and standard swallow precautions     SLP will follow up at least 1x to ensure diet toleration. If no new concerns will sign off.     Amy Guzman, MS CCC-SLP 10/7/2021 13:57 CDT      Visit Dx:    ICD-10-CM ICD-9-CM   1. Hyperosmolar hyperglycemic state (HHS) (HCC)  E11.00 250.22    E11.65    2. Altered mental status, unspecified altered mental status type  R41.82 780.97   3. Pneumonia due to infectious organism, unspecified laterality, unspecified part of lung  J18.9 486   4. Dysphagia, unspecified type  R13.10 787.20   5. 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)   • Non-ketotic hyperosmolar coma (HCC)   • HTN (hypertension)   • Intractable nausea and vomiting   • Constipation   • Gastroparesis due to DM (HCC)   • Hyperglycemia   • Seizure (HCC)   • End stage kidney disease (HCC)   • Sacral insufficiency fracture   • Hypertensive urgency   • Uncontrolled diabetes mellitus (HCC)   • Hyperkalemia   • Altered mental status   • Troponin level elevated   • Hypertensive emergency   • Acute pulmonary edema (HCC)   • Hypothyroidism   • Hyperosmolar hyperglycemic state (HHS) (HCC)   • Uremic encephalopathy syndrome   • Hyponatremia     Past Medical History:   Diagnosis Date   • Anxiety    • Arthritis    • Chronic kidney disease     STAGE V RENAL FAILURE   • Depression    • Diabetes mellitus (HCC)    • HTN (hypertension)    • Hypothyroidism    • Obesity    • Tremors of nervous system      Past Surgical History:   Procedure Laterality Date   • ARTERIOVENOUS FISTULA     • BREAST BIOPSY     • CARPAL TUNNEL RELEASE     • CATH LAB PROCEDURE     • CHOLECYSTECTOMY     • TUBAL ABDOMINAL LIGATION         SLP Recommendation and Plan           Swallow Criteria for Skilled Therapeutic Interventions Met: demonstrates skilled criteria (10/07/21 1237)  Anticipated Discharge Disposition (SLP): unknown (10/07/21 1237)     Therapy  Frequency (Swallow): PRN (10/07/21 1237)  Predicted Duration Therapy Intervention (Days): 2 days (10/07/21 1237)  Daily Summary of Progress (SLP): progress toward functional goals as expected (10/07/21 1237)          Plan of Care Reviewed With: patient, other (see comments) (KIRSTIN Urrutia) (10/07/21 1339)  Progress: improving (Re-evaluation of Swallow and Initial Cognitive Evaluation) (10/07/21 1339)  Outcome Summary: See note. (10/07/21 1339)         SLP EVALUATION (last 72 hours)      SLP SLC Evaluation     Row Name 10/07/21 1237                   Communication Assessment/Intervention    Document Type  evaluation  -MM        Subjective Information  no complaints  -MM        Patient Observations  alert;cooperative;agree to therapy  -MM        Patient/Family/Caregiver Comments/Observations  No family present.   -MM        Care Plan Review  evaluation/treatment results reviewed  -MM        Care Plan Review, Other Participant(s)  other (see comments) KIRSTIN Urrutia   -MM        Patient Effort  good  -MM        Symptoms Noted During/After Treatment  none  -MM           General Information    Patient Profile Reviewed  yes  -MM        Pertinent History Of Current Problem  Primary problem: Altered mental status. Medical history: CKD, DM, HTN, hypothyroidism, obesity, tremors, arthritis, anxiety.   -MM        Precautions/Limitations, Vision  other (see comments) Patient reports poor vision; glasses not available   -MM        Precautions/Limitations, Hearing  WFL;for purposes of eval  -MM        Prior Level of Function-Communication  WFL  -MM        Plans/Goals Discussed with  patient;other (see comments);agreed upon RN   -MM        Barriers to Rehab  none identified  -MM        Patient's Goals for Discharge  return to all previous roles/activities  -MM        Standardized Assessment Used  MMSE see note for detail   -MM           Pain    Additional Documentation  Pain Scale: FACES Pre/Post-Treatment (Group)  -MM           Pain  Scale: FACES Pre/Post-Treatment    Pain: FACES Scale, Pretreatment  0-->no hurt  -MM        Posttreatment Pain Rating  0-->no hurt  -MM           Cognitive Assessment Intervention- SLP    Cognition, Comment  see note for detail   -MM           Recommendations    Therapy Frequency (SLP SLC)  evaluation only  -MM        Anticipated Discharge Disposition (SLP)  unknown  -MM          User Key  (r) = Recorded By, (t) = Taken By, (c) = Cosigned By    Initials Name Effective Dates    Amy Oviedo MS CCC-SLP 06/16/21 -              EDUCATION  The patient has been educated in the following areas:     Cognitive Impairment.          SLP GOALS     Row Name 10/07/21 1237 10/06/21 0950          Oral Nutrition/Hydration Goal 1 (SLP)    Oral Nutrition/Hydration Goal 1, SLP  Patient will tolerate LRD without s/s of aspiration.  -MM  Patient will tolerate LRD without s/s of aspiration.  -MM     Time Frame (Oral Nutrition/Hydration Goal 1, SLP)  by discharge  -MM  by discharge  -MM     Barriers (Oral Nutrition/Hydration Goal 1, SLP)  cognition   -MM  cognition   -MM     Progress/Outcomes (Oral Nutrition/Hydration Goal 1, SLP)  goal ongoing  -MM  goal ongoing  -MM       User Key  (r) = Recorded By, (t) = Taken By, (c) = Cosigned By    Initials Name Provider Type    Amy Oviedo MS CCC-SLP Speech and Language Pathologist                  Time Calculation:     Time Calculation- SLP     Row Name 10/07/21 1355             Time Calculation- SLP    SLP Start Time  1237  -MM      SLP Stop Time  1355  -MM      SLP Time Calculation (min)  78 min  -MM      SLP Received On  10/07/21  -MM      SLP Goal Re-Cert Due Date  10/17/21  -MM         Untimed Charges    SLP Eval/Re-eval   ST Eval Speech and Production w/ Language - 41827;ST Eval Oral Pharyng Swallow - 78961  -MM      26686-OH Eval Speech and Production w/ Language Minutes  52  -MM      16173-ZG Eval Oral Pharyng Swallow Minutes  26  -MM         Total Minutes    Untimed  Charges Total Minutes  78  -MM       Total Minutes  78  -MM        User Key  (r) = Recorded By, (t) = Taken By, (c) = Cosigned By    Initials Name Provider Type    Amy Oviedo, MS CCC-SLP Speech and Language Pathologist          Therapy Charges for Today     Code Description Service Date Service Provider Modifiers Qty    [id number] HC ST EVAL ORAL PHARYNG SWALLOW 4 10/6/2021 Amy Guzman MS CCC-SLP GN 1                     Amy Guzman MS CCC-SLP  10/7/2021 and Acute Care - Speech Language Pathology   Swallow Re-Evaluation [facility]     Patient Name: Ena Upton  : 1970  MRN: [id number]  Today's Date: 10/7/2021               Admit Date: 10/4/2021    Visit Dx:     ICD-10-CM ICD-9-CM   1. Hyperosmolar hyperglycemic state (HHS) (HCC)  E11.00 250.22    E11.65    2. Altered mental status, unspecified altered mental status type  R41.82 780.97   3. Pneumonia due to infectious organism, unspecified laterality, unspecified part of lung  J18.9 486   4. Dysphagia, unspecified type  R13.10 787.20   5. 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)   • Non-ketotic hyperosmolar coma (HCC)   • HTN (hypertension)   • Intractable nausea and vomiting   • Constipation   • Gastroparesis due to DM (HCC)   • Hyperglycemia   • Seizure (HCC)   • End stage kidney disease (HCC)   • Sacral insufficiency fracture   • Hypertensive urgency   • Uncontrolled diabetes mellitus (HCC)   • Hyperkalemia   • Altered mental status   • Troponin level elevated   • Hypertensive emergency   • Acute pulmonary edema (HCC)   • Hypothyroidism   • Hyperosmolar hyperglycemic state (HHS) (HCC)   • Uremic encephalopathy syndrome   • Hyponatremia     Past Medical History:   Diagnosis Date   • Anxiety    • Arthritis    • Chronic kidney disease     STAGE V RENAL FAILURE   • Depression    • Diabetes mellitus (HCC)    • HTN (hypertension)    •  Hypothyroidism    • Obesity    • Tremors of nervous system      Past Surgical History:   Procedure Laterality Date   • ARTERIOVENOUS FISTULA     • BREAST BIOPSY     • CARPAL TUNNEL RELEASE     • CATH LAB PROCEDURE     • CHOLECYSTECTOMY     • TUBAL ABDOMINAL LIGATION         SLP Recommendation and Plan  SLP Swallowing Diagnosis: functional oral phase, R/O pharyngeal dysphagia (10/07/21 [facility]7)  SLP Diet Recommendation: regular textures, thin liquids (10/07/21 1237)  Recommended Precautions and Strategies: upright posture during/after eating, small bites of food and sips of liquid, general aspiration precautions, fatigue precautions (10/07/21 1237)  SLP Rec. for Method of Medication Administration: meds whole, as tolerated (10/07/21 1237)     Monitor for Signs of Aspiration: yes, notify SLP if any concerns (10/07/21 1237)  Recommended Diagnostics: No further SLP services recommended (10/07/21 1237)  Swallow Criteria for Skilled Therapeutic Interventions Met: demonstrates skilled criteria (10/07/21 1237)  Anticipated Discharge Disposition (SLP): unknown (10/07/21 1237)  Rehab Potential/Prognosis, Swallowing: good, to achieve stated therapy goals (10/07/21 1237)  Therapy Frequency (Swallow): PRN (10/07/21 1237)  Predicted Duration Therapy Intervention (Days): 2 days (10/07/21 1237)     Daily Summary of Progress (SLP): progress toward functional goals as expected (10/07/21 1237)                   Plan of Care Reviewed With: patient, other (see comments) (KIRSTIN Urrutia)  Progress: improving (Re-evaluation of Swallow and Initial Cognitive Evaluation)  Outcome Summary: See note.         SWALLOW EVALUATION (last 72 hours)      SLP Adult Swallow Evaluation     Row Name 10/07/21 1237 10/06/21 0950                Rehab Evaluation    Document Type  --  evaluation  -MM       Subjective Information  --  no complaints  -MM       Patient Observations  --  lethargic;poorly cooperative  -MM       Patient/Family/Caregiver  Comments/Observations  --  No family present.   -MM       Care Plan Review  --  evaluation/treatment results reviewed;current/potential barriers reviewed;patient/other agree to care plan  -MM       Care Plan Review, Other Participant(s)  --  other (see comments) RN Brunilda   -MM       Patient Effort  --  poor  -MM       Symptoms Noted During/After Treatment  --  none  -MM          General Information    Patient Profile Reviewed  --  yes  -MM       Pertinent History Of Current Problem  --  Primary problem: Altered mental status. Medical history: CKD, DM, HTN, hypothyroidism, obesity, tremors, arthritis, anxiety.   -MM       Current Method of Nutrition  NPO  -MM  NPO  -MM       Precautions/Limitations, Vision  --  other (see comments) eyes remained closed   -MM       Precautions/Limitations, Hearing  --  other (see comments) Difficulty following commands  -MM       Prior Level of Function-Communication  WFL  -MM  unknown  -MM       Prior Level of Function-Swallowing  no diet consistency restrictions  -MM  unknown  -MM       Plans/Goals Discussed with  --  patient;other (see comments);agreed upon RN   -MM       Barriers to Rehab  --  cognitive status  -MM       Patient's Goals for Discharge  return to PO diet  -MM  patient could not state  -MM          Pain    Additional Documentation  --  Pain Scale: FACES Pre/Post-Treatment (Group)  -MM          Pain Scale: FACES Pre/Post-Treatment    Pain: FACES Scale, Pretreatment  --  0-->no hurt  -MM       Posttreatment Pain Rating  --  0-->no hurt  -MM          Oral Motor Structure and Function    Dentition Assessment  natural, present and adequate;poor oral hygiene  -MM  natural, present and adequate;poor oral hygiene  -MM       Secretion Management  WNL/WFL  -MM  dried secretions in oral cavity  -MM       Mucosal Quality  moist, healthy  -MM  dry;sticky  -MM       Volitional Swallow  WFL  -MM  unable to elicit  -MM       Volitional Cough  WFL  -MM  unable to elicit  -MM           Oral Musculature and Cranial Nerve Assessment    Oral Motor General Assessment  WFL  -MM  other (see comments)  -MM       Oral Motor, Comment  --  Unable to fully assess  -MM          General Eating/Swallowing Observations    Respiratory Support Currently in Use  nasal cannula  -MM  nasal cannula  -MM       Eating/Swallowing Skills  self-fed  -MM  fed by SLP  -MM       Positioning During Eating  upright in bed  -MM  upright in bed  -MM       Utensils Used  spoon;straw  -MM  spoon;straw  -MM       Consistencies Trialed  pureed;honey-thick liquids;nectar/syrup-thick liquids;thin liquids;regular textures  -MM  pureed;thin liquids  -MM          Clinical Swallow Eval    Oral Prep Phase  WFL  -MM  impaired  -MM       Oral Transit  WFL  -MM  impaired  -MM       Oral Residue  WFL  -MM  impaired  -MM       Pharyngeal Phase  no overt signs/symptoms of pharyngeal impairment  -MM  no overt signs/symptoms of pharyngeal impairment  -MM       Esophageal Phase  unremarkable  -MM  unremarkable  -MM       Clinical Swallow Evaluation Summary  See note  -MM  See note  -MM          Oral Prep Concerns    Oral Prep Concerns  --  oral holding  -MM       Oral Holding  --  pudding  -MM          Oral Transit Concerns    Oral Transit Concerns  --  delayed initiation of bolus transit  -MM       Delayed Intiation of Bolus Transit  --  pudding  -MM          Oral Residue Concerns    Oral Residue Concerns  --  diffuse residue throughout oral cavity  -MM       Diffuse Residue Throughout Oral Cavity  --  pudding  -MM          Clinical Impression    Daily Summary of Progress (SLP)  progress toward functional goals as expected  -MM  --       Barriers to Overall Progress (SLP)  None  -MM  Cognition   -MM       SLP Swallowing Diagnosis  functional oral phase;R/O pharyngeal dysphagia  -MM  moderate;oral dysphagia;R/O pharyngeal dysphagia  -MM       Functional Impact  risk of aspiration/pneumonia  -MM  risk of aspiration/pneumonia;risk of malnutrition;risk  of dehydration  -MM       Rehab Potential/Prognosis, Swallowing  good, to achieve stated therapy goals  -MM  adequate, monitor progress closely  -MM       Swallow Criteria for Skilled Therapeutic Interventions Met  demonstrates skilled criteria  -MM  demonstrates skilled criteria  -MM          Recommendations    Therapy Frequency (Swallow)  PRN  -MM  at least;2 days per week  -MM       Predicted Duration Therapy Intervention (Days)  2 days  -MM  until discharge  -MM       SLP Diet Recommendation  regular textures;thin liquids  -MM  water between meals after oral care, with supervision;ice chips between meals after oral care, with supervision;NPO  -MM       Recommended Diagnostics  No further SLP services recommended  -MM  reassess via clinical swallow evaluation  -MM       Recommended Precautions and Strategies  upright posture during/after eating;small bites of food and sips of liquid;general aspiration precautions;fatigue precautions  -MM  upright posture during/after eating;small bites of food and sips of liquid;general aspiration precautions;fatigue precautions;1:1 supervision  -MM       Oral Care Recommendations  Oral Care BID/PRN;Toothbrush  -MM  Oral Care BID/PRN;Suction toothbrush  -MM       SLP Rec. for Method of Medication Administration  meds whole;as tolerated  -MM  meds crushed;with pudding or applesauce;as tolerated  -MM       Monitor for Signs of Aspiration  yes;notify SLP if any concerns  -MM  yes;notify SLP if any concerns  -MM       Anticipated Discharge Disposition (SLP)  --  unknown  -MM          Swallow Goals (SLP)    Oral Nutrition/Hydration Goal Selection (SLP)  oral nutrition/hydration, SLP goal 1  -MM  oral nutrition/hydration, SLP goal 1  -MM          Oral Nutrition/Hydration Goal 1 (SLP)    Oral Nutrition/Hydration Goal 1, SLP  Patient will tolerate LRD without s/s of aspiration.  -MM  Patient will tolerate LRD without s/s of aspiration.  -MM       Time Frame (Oral Nutrition/Hydration Goal  1, SLP)  by discharge  -MM  by discharge  -MM       Barriers (Oral Nutrition/Hydration Goal 1, SLP)  cognition   -MM  cognition   -MM       Progress/Outcomes (Oral Nutrition/Hydration Goal 1, SLP)  goal ongoing  -MM  goal ongoing  -MM         User Key  (r) = Recorded By, (t) = Taken By, (c) = Cosigned By    Initials Name Effective Dates    PATTI Amy Guzman MS CCC-SLP 06/16/21 -           EDUCATION  The patient has been educated in the following areas:   Dysphagia (Swallowing Impairment) Oral Care/Hydration.       SLP GOALS     Row Name 10/07/21 1237 10/06/21 0950          Oral Nutrition/Hydration Goal 1 (SLP)    Oral Nutrition/Hydration Goal 1, SLP  Patient will tolerate LRD without s/s of aspiration.  -MM  Patient will tolerate LRD without s/s of aspiration.  -MM     Time Frame (Oral Nutrition/Hydration Goal 1, SLP)  by discharge  -MM  by discharge  -MM     Barriers (Oral Nutrition/Hydration Goal 1, SLP)  cognition   -MM  cognition   -MM     Progress/Outcomes (Oral Nutrition/Hydration Goal 1, SLP)  goal ongoing  -MM  goal ongoing  -MM       User Key  (r) = Recorded By, (t) = Taken By, (c) = Cosigned By    Initials Name Provider Type    PATTI WhiteGuzmanAmy Ron, MS CCC-SLP Speech and Language Pathologist             Time Calculation:   Time Calculation- SLP     Row Name 10/07/21 1355             Time Calculation- SLP    SLP Start Time  1237  -MM      SLP Stop Time  1355  -MM      SLP Time Calculation (min)  78 min  -MM      SLP Received On  10/07/21  -MM      SLP Goal Re-Cert Due Date  10/17/21  -MM         Untimed Charges    SLP Eval/Re-eval   ST Eval Speech and Production w/ Language - 78278;ST Eval Oral Pharyng Swallow - 45190  -MM      [id number]-[id number] Eval Speech and Production w/ Language Minutes  52  -MM      [id number] Eval Oral Pharyng Swallow Minutes  26  -MM         Total Minutes    Untimed Charges Total Minutes  78  -MM       Total Minutes  78  -MM        User Key  (r) = Recorded By, (t) = Taken By, (c) =  Cosigned By    Initials Name Provider Type    MM Amy Guzman, MS CCC-SLP Speech and Language Pathologist          Therapy Charges for Today     Code Description Service Date Service Provider Modifiers Qty    [id number]  ST EVAL ORAL PHARYNG SWALLOW 4 10/6/2021 Amy Guzman, MS CCC-SLP GN 1               Amy Guzman MS CCC-SLP  10/7/2021

## 2021-10-07 NOTE — CASE MANAGEMENT/SOCIAL WORK
Continued Stay Note  [facility]     Patient Name: Ena Upton  MRN: [id number]  Today's Date: 10/7/2021    Admit Date: 10/4/2021    Discharge Plan     Row Name 10/07/21 1000       Plan    Plan  SNF placement    Provided Post Acute Provider List?  Yes    Post Acute Provider List  Nursing Home    Provided Post Acute Provider Quality & Resource List?  Yes    Post Acute Provider Quality and Resource List  Nursing Home    Delivered To  Support Person    Support Person  Sister - Kassandra Paez    Method of Delivery  Telephone    Patient/Family in Agreement with Plan  yes    Plan Comments  SW spoke with patient's sister, Kassandra Paez, regarding SNF placement as patient's mental status is not at baseline.  Sister has agreed to SNF referrals to [facility] / [facility].  Referrals made, awaiting response.        Discharge Codes    No documentation.             DUSTIN CampbellW

## 2021-10-07 NOTE — PROGRESS NOTES
Automatic IV to PO Pharmacy Note - General    Ena Upton is a 51 y.o. female who meets the following criteria for IV to PO therapy conversion :     Tolerating oral fluids or 40ml/hour of enteral nutrition and oral route not otherwise compromised  Receiving other oral medications on a scheduled basis    Assessment/Plan  Based on this criteria, Famotidine 20 mg IV daily has been changed to Famotidine 20 mg PO daily per the directives and guidelines established by [facility]acy and Therapeutics Committee and [facility] Medical Executive Committee .       Akil Anderson, PharmD  10/07/2115:13 CDT

## 2021-10-07 NOTE — PROGRESS NOTES
Nephrology ([facility]) Progress Note      Patient:  Ena Upton  YOB: 1970  Date of Service: 10/7/2021  MRN: [id number]   Acct: [id number]   Primary Care Physician: Yaron Wiggins APRN  Advance Directive:   Code Status and Medical Interventions:   Ordered at: 10/05/21 1107     Level Of Support Discussed With:    Next of Kin (If No Surrogate)     Code Status:    No CPR     Medical Interventions (Level of Support Prior to Arrest):    Full     Comments:    sister-Pat     Admit Date: 10/4/2021       Hospital Day: 2  Referring Provider: No ref. provider found      Patient personally seen and examined.  Complete chart including Consults, Notes, Operative Reports, Labs, Cardiology, and Radiology studies reviewed as able.        Subjective:  Ena Upton is a 51 y.o. female  whom we were consulted for end stage renal disease. Maintenance hemodialysis Tuesday Thursday Saturday at [facility]. Unknown last time she went to HD.  History of type 2 diabetes, hypertension, hypothyroidism.  Frequent hospitalizations due to noncompliance with dialysis and her other medical treatments. Was just discharged from [facility] a few weeks ago.  Presented this time with altered mental status, initial blood glucose was >1500. No other history of recent events is known. Hospital course remarkable for improving mental status with correction of hyperglycemia. Tolerated HD well on 10/05.    Today is awake, oriented X 3. Seen on HD    Allergies:  Penicillins, Lamisil [terbinafine], Reglan [metoclopramide], and Stadol [butorphanol]    Home Meds:  Medications Prior to Admission   Medication Sig Dispense Refill Last Dose   • albuterol (PROVENTIL HFA;VENTOLIN HFA) 108 (90 BASE) MCG/ACT inhaler Inhale 2 puffs Every 4 (Four) Hours As Needed for wheezing.   Unknown at Unknown time   • carvedilol (COREG) 25 MG tablet Take 1 tablet by mouth 2 (Two) Times a Day With Meals. 60 tablet 3    •  cloNIDine (CATAPRES) 0.3 MG tablet Take 1 tablet by mouth Every 8 (Eight) Hours. 90 tablet 3    • DULoxetine (CYMBALTA) 60 MG capsule Take 60 mg by mouth daily.      • hydrALAZINE (APRESOLINE) 50 MG tablet Take 1.5 tablets by mouth 3 (Three) Times a Day. 120 tablet 3    • insulin aspart (novoLOG) 100 UNIT/ML injection Inject 10 Units under the skin into the appropriate area as directed 3 (Three) Times a Day Before Meals.      • Insulin Glargine (BASAGLAR KWIKPEN) 100 UNIT/ML injection pen Inject 40 Units under the skin into the appropriate area as directed Every Night.      • lactulose (CHRONULAC) 10 GM/15ML solution Take 20 g by mouth 3 (Three) Times a Day.      • levothyroxine (SYNTHROID, LEVOTHROID) 50 MCG tablet Take 1 tablet by mouth Daily. 30 tablet 3    • lisinopril (PRINIVIL,ZESTRIL) 40 MG tablet Take 1 tablet by mouth Daily. 30 tablet 3    • NIFEdipine XL (PROCARDIA XL) 60 MG 24 hr tablet Take 60 mg by mouth 2 (two) times a day.      • ondansetron (ZOFRAN) 4 MG tablet Take 4 mg by mouth Every 8 (Eight) Hours As Needed for nausea or vomiting.      • pregabalin (LYRICA) 75 MG capsule Take 75 mg by mouth 2 (Two) Times a Day.      • rOPINIRole (REQUIP) 4 MG tablet Take 4 mg by mouth Every Night.      • simvastatin (ZOCOR) 20 MG tablet Take 40 mg by mouth Daily.          Medicines:  Current Facility-Administered Medications   Medication Dose Route Frequency Provider Last Rate Last Admin   • dextrose (D50W) 25 g/ 50mL Intravenous Solution 12.5 g  12.5 g Intravenous PRN Ken Moon MD   12.5 g at 10/05/21 0166   • dextrose (D50W) 25 g/ 50mL Intravenous Solution 25 g  25 g Intravenous Q15 Min PRN Ken Moon MD       • dextrose (GLUTOSE) oral gel 15 g  15 g Oral Q15 Min PRN Ken Moon MD       • enoxaparin (LOVENOX) syringe 30 mg  30 mg Subcutaneous Nightly Ken Moon MD   30 mg at 10/06/21 2006   • famotidine (PEPCID) injection 20 mg  20 mg Intravenous Daily  Ken Moon MD   20 mg at 10/07/21 0802   • glucagon (human recombinant) (GLUCAGEN DIAGNOSTIC) injection 1 mg  1 mg Subcutaneous Q15 Min PRN Ken Moon MD       • hydrALAZINE (APRESOLINE) injection 10 mg  10 mg Intravenous Q8H Adán Merrill DO   10 mg at 10/07/21 0433   • insulin detemir (LEVEMIR) injection 5 Units  5 Units Subcutaneous Nightly Ken Moon MD   5 Units at 10/06/21 2007   • insulin lispro (humaLOG) injection 0-9 Units  0-9 Units Subcutaneous Q6H Ken Moon MD   2 Units at 10/06/21 1829   • ipratropium-albuterol (DUO-NEB) nebulizer solution 3 mL  3 mL Nebulization Q6H PRN Ken Moon MD       • metoprolol tartrate (LOPRESSOR) injection 5 mg  5 mg Intravenous Q6H Adán Merrill DO   5 mg at 10/07/21 0528   • niCARdipine (CARDENE) 25 mg in 250 mL NS infusion  5-15 mg/hr Intravenous Titrated Omar Akins MD 75 mL/hr at 10/07/21 0823 7.5 mg/hr at 10/07/21 0823   • ondansetron (ZOFRAN) injection 4 mg  4 mg Intravenous Q6H PRN Ken Moon MD       • sodium chloride 0.9 % flush 10 mL  10 mL Intravenous PRN Ken Moon MD       • sodium chloride 0.9 % flush 10 mL  10 mL Intravenous Once PRN Ken Moon MD       • sodium chloride 0.9 % flush 10 mL  10 mL Intravenous Q12H Ken Moon MD   10 mL at 10/07/21 0802   • sodium chloride 0.9 % flush 10 mL  10 mL Intravenous PRN Ken Moon MD       • sodium chloride 0.9 % flush 3 mL  3 mL Intravenous Q12H Ken Moon MD   3 mL at 10/07/21 0803   • sodium chloride 0.9 % infusion  10 mL/hr Intravenous Continuous PRN Ken Moon MD           Past Medical History:  Past Medical History:   Diagnosis Date   • Anxiety    • Arthritis    • Chronic kidney disease     STAGE V RENAL FAILURE   • Depression    • Diabetes mellitus (HCC)    • HTN (hypertension)    • Hypothyroidism    • Obesity    • Tremors of  nervous system        Past Surgical History:  Past Surgical History:   Procedure Laterality Date   • ARTERIOVENOUS FISTULA     • BREAST BIOPSY     • CARPAL TUNNEL RELEASE     • CATH LAB PROCEDURE     • CHOLECYSTECTOMY     • TUBAL ABDOMINAL LIGATION         Family History  Family History   Problem Relation Age of Onset   • Cancer Other    • Hypertension Other    • Kidney disease Other    • Heart disease Other    • Diabetes Other    • Diabetes Mother    • Diabetes Maternal Aunt    • Diabetes Maternal Grandmother        Social History  Social History     Socioeconomic History   • Marital status:      Spouse name: Not on file   • Number of children: Not on file   • Years of education: Not on file   • Highest education level: Not on file   Tobacco Use   • Smoking status: Current Every Day Smoker     Packs/day: 0.50   • Smokeless tobacco: Never Used   • Tobacco comment: CUTTING BACK AND TRYING TO STOP SMOKING   Substance and Sexual Activity   • Alcohol use: No   • Drug use: No   • Sexual activity: Defer         Review of Systems:  History obtained from chart review and the patient  General ROS: No fever or chills  Respiratory ROS: No cough, shortness of breath, wheezing  Cardiovascular ROS: No chest pain or palpitations  Gastrointestinal ROS: No abdominal pain or melena  Genito-Urinary ROS: No dysuria or hematuria  Psych ROS: No anxiety and depression    Objective:  Patient Vitals for the past 24 hrs:   BP Temp Temp src Pulse Resp SpO2 Weight   10/07/21 0931 154/59 -- -- 77 -- 97 % --   10/07/21 0916 148/60 -- -- 77 -- 98 % --   10/07/21 0901 148/67 -- -- 76 20 98 % --   10/07/21 0846 156/65 -- -- 77 18 97 % --   10/07/21 0731 152/66 98.7 °F (37.1 °C) Oral 73 15 96 % --   10/07/21 0500 (!) 181/72 -- -- 83 -- 95 % --   10/07/21 0400 168/66 98.3 °F (36.8 °C) Oral 84 -- 95 % 77.6 kg (171 lb)   10/07/21 0300 156/73 -- -- 83 -- 92 % --   10/07/21 0200 155/64 -- -- 82 -- (!) 89 % --   10/07/21 0100 155/64 -- -- 82 -- 91  % --   10/07/21 0053 163/64 -- -- 88 -- -- --   10/07/21 0000 162/62 -- -- 88 -- 92 % --   10/06/21 2300 163/60 -- -- 86 -- 91 % --   10/06/21 2200 160/67 -- -- 89 -- 94 % --   10/06/21 2100 163/70 -- -- 86 -- 94 % --   10/06/21 2004 151/63 -- -- 84 -- -- --   10/06/21 2000 151/63 -- -- 83 -- 96 % --   10/06/21 1900 151/64 -- -- 89 -- 95 % --   10/06/21 1829 167/63 -- -- 93 -- -- --   10/06/21 1800 160/70 -- -- 91 14 91 % --   10/06/21 1700 157/70 -- -- 90 14 93 % --   10/06/21 1600 155/73 98.7 °F (37.1 °C) Axillary 87 13 95 % --   10/06/21 1500 151/68 -- -- 84 14 94 % --   10/06/21 1400 154/63 -- -- 82 15 98 % --   10/06/21 1300 152/58 -- -- 78 14 96 % --   10/06/21 1201 153/53 -- -- 82 -- -- --   10/06/21 1200 153/53 97.9 °F (36.6 °C) Axillary 84 12 95 % --   10/06/21 1100 147/65 -- -- 79 14 98 % --       Intake/Output Summary (Last 24 hours) at 10/7/2021 1031  Last data filed at 10/7/2021 0733  Gross per 24 hour   Intake 1435.1 ml   Output 15 ml   Net 1420.1 ml     General: awake/alert   Chest:  clear to auscultation bilaterally without respiratory distress  CVS: regular rate and rhythm  Abdominal: soft, nontender, positive bowel sounds  Extremities: no cyanosis or edema  Skin: warm and dry without rash    DIALYSIS: Left IJ permcath   UF 4L tolerating HD well    Labs:  Results from last 7 days   Lab Units 10/07/21  0327 10/05/21  2309 10/05/21  0422   WBC 10*3/mm3 6.06 10.03 7.78   HEMOGLOBIN g/dL 9.5* 10.6* 9.9*   HEMATOCRIT % 29.2* 30.5* 30.6*   PLATELETS 10*3/mm3 128* 125* 106*         Results from last 7 days   Lab Units 10/07/21  0327 10/05/21  2309 10/05/21  2029 10/05/21  0224 10/04/21  2344   SODIUM mmol/L 127* 128* 129*   < > 104*   POTASSIUM mmol/L 3.5 3.6 3.5   < > 4.8   CHLORIDE mmol/L 95* 93* 93*   < > 67*   CO2 mmol/L 22.0 23.0 23.0   < > 17.0*   BUN mg/dL 22* 18 18   < > 45*   CREATININE mg/dL 3.79* 2.88* 2.84*   < > 4.87*   CALCIUM mg/dL 8.3* 8.4* 8.1*   < > 8.4*   BILIRUBIN mg/dL   --   --   --   --  0.6   ALK PHOS U/L  --   --   --   --  167*   ALT (SGPT) U/L  --   --   --   --  7   AST (SGOT) U/L  --   --   --   --  15   GLUCOSE mg/dL 81 99 106*   < > >1,500*    < > = values in this interval not displayed.       Radiology:   Imaging Results (Last 72 Hours)     Procedure Component Value Units Date/Time    CT Abdomen Pelvis With Contrast [[id number]] Collected: 10/07/21 0757     Updated: 10/07/21 0813    Narrative:      EXAM: CT ABDOMEN PELVIS W CONTRAST- - 10/7/2021 5:49 AM CDT     HISTORY: abnormal hcg concern for hormone secreting tumor, please pt  special attention to adrenals and ovaries (not seen on pelvic us);  E11.00-Type 2 diabetes mellitus with hyperosmolarity without nonketotic  hyperglycemic-hyperosmolar coma ([facility]); E11.65-Type 2 diabetes mellitus  with hyperglycemia; R41.82-Altered mental status, unspecified;  J18.9-Pneumonia, unspecified organism; R13.10-Dysphagia, unspeci       COMPARISON: No existing relevant imaging studies available.      DOSE LENGTH PRODUCT: 2412 mGy cm. Automatic exposure control was  utilized to make radiation dose as low as reasonably achievable.     TECHNIQUE: Enhanced  axial images of the abdomen and pelvis obtained  with multiplanar reformats.     FINDINGS:  VISUALIZED CHEST: Bilateral pleural effusions. Bibasilar consolidative  opacities, which appear to be enhancing suggestive of atelectasis. No  pericardial effusion. Prominent inferior heart size.     LIVER: No focal liver lesion. Patent portal and hepatic veins.      BILIARY: Cholecystectomy clips. No bile duct dilation.     PANCREAS: Normal pancreas contour and enhancement.     SPLEEN: Normal size and contour.      ADRENAL: Normal appearance of the bilateral adrenal glands.     GENITOURINARY:   Atrophic kidneys. Left lateral renal cortex with a hypodense lesion  which appears to be nonenhancing on axial series 3, image 47. There is  also a left parapelvic simple appearing cyst. Bilateral  renovascular  calcifications. No hydronephrosis.   Page catheter in a decompressed urinary bladder. Underdistention limits  evaluation.  There may be a small uterus or residual cervix. Adnexa are not  discretely identified.     PERITONEUM: Increased density of the mesentery and omentum, favored to  represent edema. No convincing nodularity. At least a small amount of  leona perihepatic ascites. No free air.     GI TRACT: Normal configuration of the stomach and duodenum.  Diffusely  abnormal appearance of the bowel. In the appropriate clinical setting,  this appearance could be supportive of shock bowel. No evidence of bowel  obstruction. Appendix not discretely identified.     VESSELS: Aorta normal in course and caliber with calcified  atherosclerosis. Main branch vessels appear grossly patent on this  non-CTA exam.     RETROPERITONEUM: No convincing adenopathy. Limited evaluation of the  retroperitoneum due to technique.     SOFT TISSUES: Anasarca.     BONES: No acute or aggressive bony lesion.           Impression:      1. Normal adrenal glands. Ovaries not discretely identified.  2. Diffusely abnormal appearance of the bowel, which in the appropriate  clinical setting could be supportive of shock bowel.  3. Findings of fluid overload including bilateral pleural effusions,  ascites and diffusely increased density of the mesentery and omentum,  anasarca.     This report was finalized on 10/07/2021 08:10 by Dr Brenda Coker MD.    CT Head Without Contrast [[id number]] Collected: 10/05/21 1911     Updated: 10/05/21 1917    Narrative:      EXAMINATION:   CT HEAD WO CONTRAST-  10/5/2021 7:11 PM CDT     HISTORY: CT BRAIN without contrast dated 10/5/2021 6:51 PM CDT     HISTORY: Altered mental status     COMPARISON: September 11, 2021      DOSE LENGTH PRODUCT: 599 mGy cm     In order to have a CT radiation dose as low as reasonably achievable,  Automated Exposure Control was utilized for adjustment of the mA and/or  KV  according to patient size.     TECHNIQUE: Serial axial tomographic images of the brain were obtained  without the use of intravenous contrast.      FINDINGS:   The midline structures are nondisplaced. The ventricles and basilar  cisterns are normal in size and configuration. There is no evidence of  intracranial hemorrhage or mass-effect. The gray-white matter  differentiation is maintained. The sulci have a normal configuration,  and there are no abnormal extra-axial fluid collections. The structures  of the posterior fossa are unremarkable.      The included orbits and their contents are unremarkable. The visualized  paranasal sinuses, mastoid air cells and middle ear cavities are clear.  The visualized osseous structures and overlying soft tissues of the  skull and face are unremarkable.        Impression:      1. Some image degradation is present due to patient motion however there  is no obvious acute intracranial abnormality.        This report was finalized on 10/05/2021 19:13 by Dr. Lazaro Martinez MD.    US Pelvis Complete [[id number]] Collected: 10/05/21 1310     Updated: 10/05/21 1318    Narrative:      EXAM: US PELVIS COMPLETE- - 10/5/2021 12:45 PM CDT     HISTORY: elevated hcg, special attention to ovaries, r/o mass/tumor.;  E11.00-Type 2 diabetes mellitus with hyperosmolarity without nonketotic  hyperglycemic-hyperosmolar coma ([facility]); E11.65-Type 2 diabetes mellitus  with hyperglycemia; R41.82-Altered mental status, unspecified;  J18.9-Pneumonia, unspecified organism       COMPARISON: 6/19/2021, and 10/18/2016     TECHNIQUE: Real time gray scale, color and spectral Doppler ultrasound  performed with representative images saved to PACS. Exam discussed in  person with the technologist.     FINDINGS:  UTERUS. There appears to be a small uterus measuring 3.9 x 1.7 cm on  transabdominal imaging. Transvaginal imaging performed as the patient  was not able to consent due to exam according to technologist.      Page catheter in a normal-appearing urinary bladder.     RIGHT OVARY. Not visualized     LEFT OVARY. Not visualized     No large volume free fluid in the pelvis.          Impression:      1. Atrophic appearing uterus on transabdominal imaging. Limited exam on  this ICU patient.  2. Ovaries not demonstrated.  3. Normal appearance of the urinary bladder.  This report was finalized on 10/05/2021 13:15 by Dr Brenda Coker MD.    XR Chest 1 View [[id number]] Collected: 10/05/21 0712     Updated: 10/05/21 0717    Narrative:      EXAMINATION: Chest 1 view 10/4/2021     HISTORY: Altered mental status     FINDINGS: Upright frontal projection of chest is compared to previous  study of 9/16/2021. There are persistent diffuse increased interstitial  markings of the lungs as well as mild cardiomegaly. Left atrial  enlargement is suspected with splaying of the mainstem bronchi. A right  basilar infiltrate is present. There is no effusion or free air present.       Impression:      1.. Cardiomegaly. There is left atrial enlargement.  2. Increased interstitial markings which appear to be somewhat chronic  in nature. There is more confluent disease in the right lung base  suspicious for pneumonia.  This report was finalized on 10/05/2021 07:14 by Dr. Carlos Alberto Koehler MD.          Culture:  Blood Culture   Date Value Ref Range Status   10/05/2021 No growth at 24 hours  Preliminary   10/04/2021 No growth at 24 hours  Preliminary         Assessment   1.  End stage renal disease on HD TTS  2.  Type 2 diabetes  3.  Hypertension  4.  Hyperosmolar hyperglycemic state--improved  5.  Hyponatremia due to volume overload  6.  Metabolic acidosis--improved  7.  Metabolic encephalopathy--improving  8.  Anemia of CKD    Plan:  1.  Dialysis next due 10/09  2.  Serum sodium remains low secondary to large volume of IV fluids infused since admission. UF 4L today. Continue to monitor.   3.  Monitor labs      Effie Castaneda,  APRN  10/7/2021  10:31 CDT

## 2021-10-07 NOTE — PROGRESS NOTES
[facility]  INPATIENT PROGRESS NOTE    Patient Name: Ena Upton  Date of Admission: 10/4/2021  Today's Date: 10/07/21  Length of Stay: 2  Primary Care Physician: Yaron Wiggins, GAEL    Subjective   Chief Complaint: f/u Encephalopathy/HHS    HPI   Patient is much more awake alert interactive today.  Very pleasant.  Says she feels well.  Denies any complaints.  She is hungry and wants to eat.  No nausea or vomiting.  No abdominal pain.  No chest pain or shortness of breath.  Currently on room air as she is not wearing her nasal cannula even though she is charted on 2 L.  She is satting 96%.        Review of Systems   All pertinent negatives and positives are as above. All other systems have been reviewed and are negative unless otherwise stated.     Objective    Temp:  [97.9 °F (36.6 °C)-98.7 °F (37.1 °C)] 98.7 °F (37.1 °C)  Heart Rate:  [73-93] 73  Resp:  [12-18] 15  BP: (139-181)/(53-73) 152/66  Physical Exam  GEN: Awake, alert, interactive, in NAD  HEENT: PERRLA, EOMI, Anicteric, Trachea midline  Lungs:  no wheezing/rales/rhonchi  Heart: RRR, +S1/s2, no rub  ABD: soft, nt/nd, +BS, no guarding/rebound  Extremities: atraumatic, no cyanosis  Skin: no rashes or lesions  Neuro: AAOx3, no focal deficits  Psych: normal mood & affect        Results Review:  I have reviewed the labs, radiology results, and diagnostic studies.    Laboratory Data:   Results from last 7 days   Lab Units 10/07/21  0327 10/05/21  2309 10/05/21  0422   WBC 10*3/mm3 6.06 10.03 7.78   HEMOGLOBIN g/dL 9.5* 10.6* 9.9*   HEMATOCRIT % 29.2* 30.5* 30.6*   PLATELETS 10*3/mm3 128* 125* 106*        Results from last 7 days   Lab Units 10/07/21  0327 10/05/21  2309 10/05/21  2029 10/05/21  0224 10/04/21  2344   SODIUM mmol/L 127* 128* 129*   < > 104*   POTASSIUM mmol/L 3.5 3.6 3.5   < > 4.8   CHLORIDE mmol/L 95* 93* 93*   < > 67*   CO2 mmol/L 22.0 23.0 23.0   < > 17.0*   BUN mg/dL 22* 18 18    < > 45*   CREATININE mg/dL 3.79* 2.88* 2.84*   < > 4.87*   CALCIUM mg/dL 8.3* 8.4* 8.1*   < > 8.4*   BILIRUBIN mg/dL  --   --   --   --  0.6   ALK PHOS U/L  --   --   --   --  167*   ALT (SGPT) U/L  --   --   --   --  7   AST (SGOT) U/L  --   --   --   --  15   GLUCOSE mg/dL 81 99 106*   < > >1,500*    < > = values in this interval not displayed.       Culture Data:   Blood Culture   Date Value Ref Range Status   10/05/2021 No growth at 24 hours  Preliminary   10/04/2021 No growth at 24 hours  Preliminary       Radiology Data:   Imaging Results (Last 24 Hours)     Procedure Component Value Units Date/Time    CT Abdomen Pelvis With Contrast [[id number]] Collected: 10/07/21 0757     Updated: 10/07/21 0813    Narrative:      EXAM: CT ABDOMEN PELVIS W CONTRAST- - 10/7/2021 5:49 AM CDT     HISTORY: abnormal hcg concern for hormone secreting tumor, please pt  special attention to adrenals and ovaries (not seen on pelvic us);  E11.00-Type 2 diabetes mellitus with hyperosmolarity without nonketotic  hyperglycemic-hyperosmolar coma ([facility]); E11.65-Type 2 diabetes mellitus  with hyperglycemia; R41.82-Altered mental status, unspecified;  J18.9-Pneumonia, unspecified organism; R13.10-Dysphagia, unspeci       COMPARISON: No existing relevant imaging studies available.      DOSE LENGTH PRODUCT: 2412 mGy cm. Automatic exposure control was  utilized to make radiation dose as low as reasonably achievable.     TECHNIQUE: Enhanced  axial images of the abdomen and pelvis obtained  with multiplanar reformats.     FINDINGS:  VISUALIZED CHEST: Bilateral pleural effusions. Bibasilar consolidative  opacities, which appear to be enhancing suggestive of atelectasis. No  pericardial effusion. Prominent inferior heart size.     LIVER: No focal liver lesion. Patent portal and hepatic veins.      BILIARY: Cholecystectomy clips. No bile duct dilation.     PANCREAS: Normal pancreas contour and enhancement.     SPLEEN: Normal size and contour.       ADRENAL: Normal appearance of the bilateral adrenal glands.     GENITOURINARY:   Atrophic kidneys. Left lateral renal cortex with a hypodense lesion  which appears to be nonenhancing on axial series 3, image 47. There is  also a left parapelvic simple appearing cyst. Bilateral renovascular  calcifications. No hydronephrosis.   Page catheter in a decompressed urinary bladder. Underdistention limits  evaluation.  There may be a small uterus or residual cervix. Adnexa are not  discretely identified.     PERITONEUM: Increased density of the mesentery and omentum, favored to  represent edema. No convincing nodularity. At least a small amount of  leona perihepatic ascites. No free air.     GI TRACT: Normal configuration of the stomach and duodenum.  Diffusely  abnormal appearance of the bowel. In the appropriate clinical setting,  this appearance could be supportive of shock bowel. No evidence of bowel  obstruction. Appendix not discretely identified.     VESSELS: Aorta normal in course and caliber with calcified  atherosclerosis. Main branch vessels appear grossly patent on this  non-CTA exam.     RETROPERITONEUM: No convincing adenopathy. Limited evaluation of the  retroperitoneum due to technique.     SOFT TISSUES: Anasarca.     BONES: No acute or aggressive bony lesion.           Impression:      1. Normal adrenal glands. Ovaries not discretely identified.  2. Diffusely abnormal appearance of the bowel, which in the appropriate  clinical setting could be supportive of shock bowel.  3. Findings of fluid overload including bilateral pleural effusions,  ascites and diffusely increased density of the mesentery and omentum,  anasarca.     This report was finalized on 10/07/2021 08:10 by Dr Brenda Coker MD.          I have reviewed the patient's current medications.     Assessment/Plan     Active Hospital Problems    Diagnosis    • **Hyperosmolar hyperglycemic state (HHS) (HCC)    • Hyponatremia    • Uremic  encephalopathy syndrome    • Hypertensive emergency    • Hypothyroidism    • End stage kidney disease (HCC)        #1  HHS -resolved and now off insulin drip.  Continue with Levemir and sliding scale.  Hypoglycemia protocol.  Wife to watch sugars closely she starts to eat today hopefully.    #2  Encephalopathy -presented altered in the setting of HHS and missed dialysis.  Also potentially in the setting of hypertension.  Suspect combination of these 3 things.  Doing much better for the last 48 hours.  Head CT 10/5 without any obvious acute issues.  Continue supportive care.  Continue to treat blood pressure, sugars, kidneys.  HD per nephro.    #3 ESRD -currently getting HD today.  Appreciate nephro assistance with ongoing HD.    #4 hypertensive emergency -remains on Cardene drip.  Hopefully she will pass a swallow today and we can get her back on her home regimen.  She is on multiple meds at home.  May start them back in stepwise fashion however given her history of noncompliance as it is unclear which does not does not take outside here.    #5+ hCG -patient had a faint positive urine hCG on arrival.  Quantitative was done and mildly elevated at 6.5.  Patient has a history of tubal ligation and states she is not sexually active.  Highly doubt pregnancy.  A transvaginal ultrasound was ordered on 10/5 but patient was unable to consent to it and just had a pelvic done.  It showed an atrophic small uterus.  Unfortunately ovaries were not demonstrated on the transabdominal and that is the other potential concern as she could have a malignancy/hormone secreting tumor.  She underwent a CT of the abdomen pelvis with contrast today that does not show any masses.  Ovaries were not clearly identified.  Her uterus is very small and atrophic to just potentially residual cervix.  Adrenals were normal without any masses either.  A repeat quantitative hCG again today was at 6.5 and has not increased at all.  Again overall findings  not consistent with pregnancy or tumor.    #6 hyponatremia - likely some hypervolemic affect from fluids with DKA protocol. Will monitor and suspect will improve with HD tomorrow.     Discharge Planning: Continue in ICU today.  Hopefully be able to start on oral meds and wean off Cardene drip.  Continue current care.  PT and OT to eval.  Expect SNF at discharge.    Electronically signed by Adán Merrill DO, 10/07/21, 08:19 CDT.

## 2021-10-07 NOTE — PLAN OF CARE
Goal Outcome Evaluation:  Plan of Care Reviewed With: patient, other (see comments) (KIRSTIN Urrutia)        Progress: improving (Re-evaluation of Swallow and Initial Cognitive Evaluation)  Outcome Summary: See note.

## 2021-10-08 NOTE — PLAN OF CARE
Goal Outcome Evaluation:  Plan of Care Reviewed With: patient        Progress: improving  Outcome Summary: ST tx completed. Pt sitting upright in bedside chair with lunch tray of regular solids and thin liquids. No overt s/s of aspiration observed with current diet. RN reports no increased lung changes. ST to d/c from speech services. MD to reconsult if change or new concern.

## 2021-10-08 NOTE — PLAN OF CARE
Goal Outcome Evaluation:  Plan of Care Reviewed With: patient        Progress: improving  Outcome Summary: PT eval completed.  Pt pleasant and agreeable to therapy.  Pt demonstrates generalized weakness and decrease activity tolerance now requiring assist to perform functional mobility w/ decrease balance requiring use of rwx noting increase fall risk w/ decrease gait mechanics.  Pt tolerated ~ 30 ft of gait this date w/ decrease step length, foot clearance, heel strike and flexed posture.  Pt will benefit from continued PT services to improve activity tolerance, safety awareness, balance, strength, and I w/ functional mobility reducing fall risk.  Recommend continued skilled care at discharge.  Pt believes they are working on getting her into a SNF for rehab. Will follow for progress and needs.

## 2021-10-08 NOTE — CONSULTS
Pt awake and alert.  Admitted with HHS (blood glucose was greater that 1500).  Pt told me multiple time that she did not know what had happened to her.  Pt did admit that she had not been checking her blood sugars and did not know they were high.  Pt tells me she can not remember taking her medicines.  Palliative Care has talked with family and they all have agreed for pt to be discharged to SNF for inability to care for herself.  Time left for questions.

## 2021-10-08 NOTE — PROGRESS NOTES
Nephrology ([facility]) Progress Note      Patient:  Ena Upton  YOB: 1970  Date of Service: 10/8/2021  MRN: [id number]   Acct: [id number]   Primary Care Physician: Yaron Wiggins APRN  Advance Directive:   Code Status and Medical Interventions:   Ordered at: 10/05/21 1107     Level Of Support Discussed With:    Next of Kin (If No Surrogate)     Code Status:    No CPR     Medical Interventions (Level of Support Prior to Arrest):    Full     Comments:    sister-Pat     Admit Date: 10/4/2021       Hospital Day: 3  Referring Provider: No ref. provider found      Patient personally seen and examined.  Complete chart including Consults, Notes, Operative Reports, Labs, Cardiology, and Radiology studies reviewed as able.        Subjective:  Ena Upton is a 51 y.o. female  whom we were consulted for end stage renal disease. Maintenance hemodialysis Tuesday Thursday Saturday at [facility]. Unknown last time she went to HD.  History of type 2 diabetes, hypertension, hypothyroidism.  Frequent hospitalizations due to noncompliance with dialysis and her other medical treatments. Was just discharged from [facility] a few weeks ago.  Presented this time with altered mental status, initial blood glucose was >1500. No other history of recent events is known. Hospital course remarkable for improving mental status with correction of hyperglycemia. Tolerated HD well on 10/07.    Today is awake, oriented X 2. Lethargic. Remains on Cardene drip.    Allergies:  Penicillins, Lamisil [terbinafine], Reglan [metoclopramide], and Stadol [butorphanol]    Home Meds:  Medications Prior to Admission   Medication Sig Dispense Refill Last Dose   • famotidine (PEPCID) 20 MG tablet Take 20 mg by mouth 2 (Two) Times a Day.      • gabapentin (NEURONTIN) 300 MG capsule Take 300 mg by mouth 3 (Three) Times a Day.      • isosorbide mononitrate (IMDUR) 120 MG 24 hr tablet Take 120 mg by mouth  Daily.      • lisinopril (PRINIVIL,ZESTRIL) 20 MG tablet Take 20 mg by mouth 2 (Two) Times a Day.      • nicotine (NICODERM CQ) 21 MG/24HR patch Place 1 patch on the skin as directed by provider Daily.      • albuterol (PROVENTIL HFA;VENTOLIN HFA) 108 (90 BASE) MCG/ACT inhaler Inhale 2 puffs Every 4 (Four) Hours As Needed for wheezing.   Unknown at Unknown time   • carvedilol (COREG) 25 MG tablet Take 1 tablet by mouth 2 (Two) Times a Day With Meals. 60 tablet 3    • cloNIDine (CATAPRES) 0.3 MG tablet Take 1 tablet by mouth Every 8 (Eight) Hours. 90 tablet 3    • DULoxetine (CYMBALTA) 60 MG capsule Take 60 mg by mouth daily.      • hydrALAZINE (APRESOLINE) 50 MG tablet Take 1.5 tablets by mouth 3 (Three) Times a Day. 120 tablet 3    • insulin aspart (novoLOG) 100 UNIT/ML injection Inject 10 Units under the skin into the appropriate area as directed 3 (Three) Times a Day Before Meals.      • Insulin Glargine (BASAGLAR KWIKPEN) 100 UNIT/ML injection pen Inject 40 Units under the skin into the appropriate area as directed Every Night.      • lactulose (CHRONULAC) 10 GM/15ML solution Take 20 g by mouth 3 (Three) Times a Day.      • levothyroxine (SYNTHROID, LEVOTHROID) 50 MCG tablet Take 1 tablet by mouth Daily. 30 tablet 3    • NIFEdipine XL (PROCARDIA XL) 60 MG 24 hr tablet Take 60 mg by mouth 2 (two) times a day.      • rOPINIRole (REQUIP) 4 MG tablet Take 4 mg by mouth Every Night.      • simvastatin (ZOCOR) 20 MG tablet Take 40 mg by mouth Daily.          Medicines:  Current Facility-Administered Medications   Medication Dose Route Frequency Provider Last Rate Last Admin   • carvedilol (COREG) tablet 25 mg  25 mg Oral BID With Meals Adán Merrill DO   25 mg at 10/08/21 0713   • cloNIDine (CATAPRES) tablet 0.1 mg  0.1 mg Oral Q8H Adán Merrill DO   0.1 mg at 10/08/21 0918   • dextrose (D50W) 25 g/ 50mL Intravenous Solution 12.5 g  12.5 g Intravenous PRN Polidori, Ken [place], MD   12.5 g at 10/05/21 6553    • dextrose (D50W) 25 g/ 50mL Intravenous Solution 25 g  25 g Intravenous Q15 Min PRN Ken Moon MD       • dextrose (GLUTOSE) oral gel 15 g  15 g Oral Q15 Min PRN Ken Moon MD       • enoxaparin (LOVENOX) syringe 30 mg  30 mg Subcutaneous Nightly Ken Moon MD   30 mg at 10/07/21 2017   • famotidine (PEPCID) tablet 20 mg  20 mg Oral Daily Adán Merrill DO   20 mg at 10/08/21 0803   • glucagon (human recombinant) (GLUCAGEN DIAGNOSTIC) injection 1 mg  1 mg Subcutaneous Q15 Min PRN Ken Moon MD       • hydrALAZINE (APRESOLINE) tablet 50 mg  50 mg Oral Q8H Adán Merrill DO   50 mg at 10/08/21 0531   • insulin detemir (LEVEMIR) injection 5 Units  5 Units Subcutaneous Nightly Ken Moon MD   5 Units at 10/07/21 2021   • insulin lispro (humaLOG) injection 0-9 Units  0-9 Units Subcutaneous TID AC Adán Merrill DO   2 Units at 10/07/21 1714   • ipratropium-albuterol (DUO-NEB) nebulizer solution 3 mL  3 mL Nebulization Q6H PRN Ken Moon MD       • labetalol (NORMODYNE,TRANDATE) injection 20 mg  20 mg Intravenous Q6H PRN Adán Merrill DO   20 mg at 10/08/21 0914   • lisinopril (PRINIVIL,ZESTRIL) tablet 20 mg  20 mg Oral BID Adán Merrill DO   20 mg at 10/08/21 0803   • niCARdipine (CARDENE) 25 mg in 250 mL NS infusion  5-15 mg/hr Intravenous Titrated Omar Akins MD 25 mL/hr at 10/08/21 0714 2.5 mg/hr at 10/08/21 0714   • NIFEdipine XL (PROCARDIA XL) 24 hr tablet 60 mg  60 mg Oral Q24H Adán Merrill DO   60 mg at 10/08/21 0918   • ondansetron (ZOFRAN) injection 4 mg  4 mg Intravenous Q6H PRN Ken Moon MD       • sodium chloride 0.9 % flush 10 mL  10 mL Intravenous PRN Ken Moon MD       • sodium chloride 0.9 % flush 10 mL  10 mL Intravenous Once PRN Ken Moon MD       • sodium chloride 0.9 % flush 10 mL  10 mL Intravenous Q12H Ken Moon MD   10 mL  at 10/08/21 0805   • sodium chloride 0.9 % flush 10 mL  10 mL Intravenous PRN Ken Moon MD       • sodium chloride 0.9 % flush 3 mL  3 mL Intravenous Q12H Ken Moon MD   3 mL at 10/08/21 0805   • sodium chloride 0.9 % infusion  10 mL/hr Intravenous Continuous PRN Ken Moon MD           Past Medical History:  Past Medical History:   Diagnosis Date   • Anxiety    • Arthritis    • Chronic kidney disease     STAGE V RENAL FAILURE   • Depression    • Diabetes mellitus (HCC)    • HTN (hypertension)    • Hypothyroidism    • Obesity    • Tremors of nervous system        Past Surgical History:  Past Surgical History:   Procedure Laterality Date   • ARTERIOVENOUS FISTULA     • BREAST BIOPSY     • CARPAL TUNNEL RELEASE     • CATH LAB PROCEDURE     • CHOLECYSTECTOMY     • TUBAL ABDOMINAL LIGATION         Family History  Family History   Problem Relation Age of Onset   • Cancer Other    • Hypertension Other    • Kidney disease Other    • Heart disease Other    • Diabetes Other    • Diabetes Mother    • Diabetes Maternal Aunt    • Diabetes Maternal Grandmother        Social History  Social History     Socioeconomic History   • Marital status:      Spouse name: Not on file   • Number of children: Not on file   • Years of education: Not on file   • Highest education level: Not on file   Tobacco Use   • Smoking status: Current Every Day Smoker     Packs/day: 0.50   • Smokeless tobacco: Never Used   • Tobacco comment: CUTTING BACK AND TRYING TO STOP SMOKING   Substance and Sexual Activity   • Alcohol use: No   • Drug use: No   • Sexual activity: Defer         Review of Systems:  History obtained from chart review and the patient  General ROS: No fever or chills  Respiratory ROS: No cough, shortness of breath, wheezing  Cardiovascular ROS: No chest pain or palpitations  Gastrointestinal ROS: No abdominal pain or melena  Genito-Urinary ROS: No dysuria or hematuria  Psych ROS: No  anxiety and depression    Objective:  Patient Vitals for the past 24 hrs:   BP Temp Temp src Pulse Resp SpO2 Weight   10/08/21 0914 (!) 187/73 -- -- 75 -- -- --   10/08/21 0715 158/70 -- -- 79 -- 95 % --   10/08/21 0713 154/72 -- -- 80 -- -- --   10/08/21 0600 (!) 182/73 -- -- 78 -- 95 % --   10/08/21 0500 180/71 -- -- 77 -- 94 % --   10/08/21 0400 167/73 98.5 °F (36.9 °C) Oral 76 -- 93 % --   10/08/21 0300 169/83 -- -- 77 -- 92 % 78.3 kg (172 lb 9.9 oz)   10/08/21 0217 170/72 -- -- 80 -- -- --   10/08/21 0200 178/89 -- -- 79 -- 96 % --   10/08/21 0000 (!) 181/79 -- -- 84 -- 92 % --   10/07/21 2300 (!) 200/91 -- -- 86 -- 92 % --   10/07/21 2200 173/74 -- -- 85 -- 94 % --   10/07/21 2159 175/87 -- -- -- -- -- --   10/07/21 2100 175/87 -- -- 91 -- 94 % --   10/07/21 2016 (!) 186/77 -- -- 89 -- -- --   10/07/21 2000 (!) 186/77 -- -- 90 -- 94 % --   10/07/21 1900 (!) 186/78 -- -- 88 -- 94 % --   10/07/21 1800 157/70 -- -- 86 20 96 % --   10/07/21 1700 158/69 -- -- 85 -- 95 % --   10/07/21 1656 (!) 212/76 -- -- 88 -- -- --   10/07/21 1600 (!) 182/76 -- -- 92 -- 98 % --   10/07/21 1500 160/70 -- -- 87 15 97 % --   10/07/21 1430 145/62 -- -- 84 -- 97 % --   10/07/21 1415 150/77 -- -- 82 -- 95 % --   10/07/21 1400 147/57 -- -- 84 -- 94 % --   10/07/21 1330 178/70 -- -- 83 -- 96 % --   10/07/21 1315 169/66 -- -- 79 -- 97 % --   10/07/21 1300 167/69 -- -- 78 -- 97 % --   10/07/21 1245 167/68 -- -- 77 -- 96 % --   10/07/21 1230 154/77 -- -- 79 -- 95 % --   10/07/21 1215 161/69 -- -- 79 -- 96 % --   10/07/21 1200 162/63 98.6 °F (37 °C) Oral 80 14 96 % --   10/07/21 1145 162/59 -- -- 83 -- 98 % --   10/07/21 1130 160/66 -- -- 81 -- 98 % --   10/07/21 1115 163/68 -- -- 80 -- 97 % --   10/07/21 1101 149/62 -- -- 80 -- 98 % --       Intake/Output Summary (Last 24 hours) at 10/8/2021 1007  Last data filed at 10/7/2021 1700  Gross per 24 hour   Intake 582.92 ml   Output 3525 ml   Net -2942.08 ml     General: awake/alert   Chest:   clear to auscultation bilaterally without respiratory distress  CVS: regular rate and rhythm  Abdominal: soft, nontender, positive bowel sounds  Extremities: no cyanosis or edema  Skin: warm and dry without rash      Labs:  Results from last 7 days   Lab Units 10/08/21  0221 10/07/21  0327 10/05/21  2309   WBC 10*3/mm3 4.02 6.06 10.03   HEMOGLOBIN g/dL 9.1* 9.5* 10.6*   HEMATOCRIT % 28.7* 29.2* 30.5*   PLATELETS 10*3/mm3 91* 128* 125*         Results from last 7 days   Lab Units 10/07/21  0327 10/05/21  2309 10/05/21  2029 10/05/21  0224 10/04/21  2344   SODIUM mmol/L 127* 128* 129*   < > 104*   POTASSIUM mmol/L 3.5 3.6 3.5   < > 4.8   CHLORIDE mmol/L 95* 93* 93*   < > 67*   CO2 mmol/L 22.0 23.0 23.0   < > 17.0*   BUN mg/dL 22* 18 18   < > 45*   CREATININE mg/dL 3.79* 2.88* 2.84*   < > 4.87*   CALCIUM mg/dL 8.3* 8.4* 8.1*   < > 8.4*   BILIRUBIN mg/dL  --   --   --   --  0.6   ALK PHOS U/L  --   --   --   --  167*   ALT (SGPT) U/L  --   --   --   --  7   AST (SGOT) U/L  --   --   --   --  15   GLUCOSE mg/dL 81 99 106*   < > >1,500*    < > = values in this interval not displayed.       Radiology:   Imaging Results (Last 72 Hours)     Procedure Component Value Units Date/Time    CT Abdomen Pelvis With Contrast [[id number]] Collected: 10/07/21 0757     Updated: 10/07/21 0813    Narrative:      EXAM: CT ABDOMEN PELVIS W CONTRAST- - 10/7/2021 5:49 AM CDT     HISTORY: abnormal hcg concern for hormone secreting tumor, please pt  special attention to adrenals and ovaries (not seen on pelvic us);  E11.00-Type 2 diabetes mellitus with hyperosmolarity without nonketotic  hyperglycemic-hyperosmolar coma ([facility]); E11.65-Type 2 diabetes mellitus  with hyperglycemia; R41.82-Altered mental status, unspecified;  J18.9-Pneumonia, unspecified organism; R13.10-Dysphagia, unspeci       COMPARISON: No existing relevant imaging studies available.      DOSE LENGTH PRODUCT: 2412 mGy cm. Automatic exposure control was  utilized to make  radiation dose as low as reasonably achievable.     TECHNIQUE: Enhanced  axial images of the abdomen and pelvis obtained  with multiplanar reformats.     FINDINGS:  VISUALIZED CHEST: Bilateral pleural effusions. Bibasilar consolidative  opacities, which appear to be enhancing suggestive of atelectasis. No  pericardial effusion. Prominent inferior heart size.     LIVER: No focal liver lesion. Patent portal and hepatic veins.      BILIARY: Cholecystectomy clips. No bile duct dilation.     PANCREAS: Normal pancreas contour and enhancement.     SPLEEN: Normal size and contour.      ADRENAL: Normal appearance of the bilateral adrenal glands.     GENITOURINARY:   Atrophic kidneys. Left lateral renal cortex with a hypodense lesion  which appears to be nonenhancing on axial series 3, image 47. There is  also a left parapelvic simple appearing cyst. Bilateral renovascular  calcifications. No hydronephrosis.   Page catheter in a decompressed urinary bladder. Underdistention limits  evaluation.  There may be a small uterus or residual cervix. Adnexa are not  discretely identified.     PERITONEUM: Increased density of the mesentery and omentum, favored to  represent edema. No convincing nodularity. At least a small amount of  leona perihepatic ascites. No free air.     GI TRACT: Normal configuration of the stomach and duodenum.  Diffusely  abnormal appearance of the bowel. In the appropriate clinical setting,  this appearance could be supportive of shock bowel. No evidence of bowel  obstruction. Appendix not discretely identified.     VESSELS: Aorta normal in course and caliber with calcified  atherosclerosis. Main branch vessels appear grossly patent on this  non-CTA exam.     RETROPERITONEUM: No convincing adenopathy. Limited evaluation of the  retroperitoneum due to technique.     SOFT TISSUES: Anasarca.     BONES: No acute or aggressive bony lesion.           Impression:      1. Normal adrenal glands. Ovaries not  discretely identified.  2. Diffusely abnormal appearance of the bowel, which in the appropriate  clinical setting could be supportive of shock bowel.  3. Findings of fluid overload including bilateral pleural effusions,  ascites and diffusely increased density of the mesentery and omentum,  anasarca.     This report was finalized on 10/07/2021 08:10 by Dr Brenda Coker MD.    CT Head Without Contrast [[id number]] Collected: 10/05/21 1911     Updated: 10/05/21 1917    Narrative:      EXAMINATION:   CT HEAD WO CONTRAST-  10/5/2021 7:11 PM CDT     HISTORY: CT BRAIN without contrast dated 10/5/2021 6:51 PM CDT     HISTORY: Altered mental status     COMPARISON: September 11, 2021      DOSE LENGTH PRODUCT: 599 mGy cm     In order to have a CT radiation dose as low as reasonably achievable,  Automated Exposure Control was utilized for adjustment of the mA and/or  KV according to patient size.     TECHNIQUE: Serial axial tomographic images of the brain were obtained  without the use of intravenous contrast.      FINDINGS:   The midline structures are nondisplaced. The ventricles and basilar  cisterns are normal in size and configuration. There is no evidence of  intracranial hemorrhage or mass-effect. The gray-white matter  differentiation is maintained. The sulci have a normal configuration,  and there are no abnormal extra-axial fluid collections. The structures  of the posterior fossa are unremarkable.      The included orbits and their contents are unremarkable. The visualized  paranasal sinuses, mastoid air cells and middle ear cavities are clear.  The visualized osseous structures and overlying soft tissues of the  skull and face are unremarkable.        Impression:      1. Some image degradation is present due to patient motion however there  is no obvious acute intracranial abnormality.        This report was finalized on 10/05/2021 19:13 by Dr. Lazaro Martinez MD.          Culture:  Blood Culture   Date Value Ref  Range Status   10/05/2021 No growth at 24 hours  Preliminary   10/04/2021 No growth at 24 hours  Preliminary         Assessment   1.  End stage renal disease on HD TTS  2.  Type 2 diabetes  3.  Hypertension- uncontrolled  4.  Hyperosmolar hyperglycemic state--improved  5.  Hyponatremia due to volume overload  6.  Metabolic acidosis--improved  7.  Metabolic encephalopathy--improving  8.  Anemia of CKD  9. Hypothyroidism- patient has not been receiving Synthroid during this admission.     Plan:  1.  Dialysis next due 10/09  2.  Serum sodium has remained low secondary to large volume of IV fluids infused since admission. Recheck CMP and Thyroid panel today.   3.  Am labs      Effie Castaneda, GAEL  10/8/2021  10:07 CDT

## 2021-10-08 NOTE — THERAPY EVALUATION
Patient Name: Ena Upton  : 1970    MRN: [id number]                              Today's Date: 10/8/2021       Admit Date: 10/4/2021    Visit Dx:     ICD-10-CM ICD-9-CM   1. Hyperosmolar hyperglycemic state (HHS) (HCC)  E11.00 250.22    E11.65    2. Altered mental status, unspecified altered mental status type  R41.82 780.97   3. Pneumonia due to infectious organism, unspecified laterality, unspecified part of lung  J18.9 486   4. Dysphagia, unspecified type  R13.10 787.20   5. Cognitive changes  R41.89 799.59   6. Decreased activities of daily living (ADL)  Z78.9 V49.89   7. 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)   • Non-ketotic hyperosmolar coma (HCC)   • HTN (hypertension)   • Intractable nausea and vomiting   • Constipation   • Gastroparesis due to DM (HCC)   • Hyperglycemia   • Seizure (HCC)   • End stage kidney disease (HCC)   • Sacral insufficiency fracture   • Hypertensive urgency   • Uncontrolled diabetes mellitus (HCC)   • Hyperkalemia   • Altered mental status   • Troponin level elevated   • Hypertensive emergency   • Acute pulmonary edema (HCC)   • Hypothyroidism   • Hyperosmolar hyperglycemic state (HHS) (HCC)   • Uremic encephalopathy syndrome   • Hyponatremia     Past Medical History:   Diagnosis Date   • Anxiety    • Arthritis    • Chronic kidney disease     STAGE V RENAL FAILURE   • Depression    • Diabetes mellitus (HCC)    • HTN (hypertension)    • Hypothyroidism    • Obesity    • Tremors of nervous system      Past Surgical History:   Procedure Laterality Date   • ARTERIOVENOUS FISTULA     • BREAST BIOPSY     • CARPAL TUNNEL RELEASE     • CATH LAB PROCEDURE     • CHOLECYSTECTOMY     • TUBAL ABDOMINAL LIGATION       General Information     Row Name 10/08/21 1545          Physical Therapy Time and Intention    Document Type  evaluation;other (see comments) see MAR  -JE     Mode of Treatment   physical therapy  -     Row Name 10/08/21 1545          General Information    Patient Profile Reviewed  yes  -SUKUMAR     Prior Level of Function  dependent:;driving;independent:;all household mobility;community mobility;ADL's;cooking;cleaning;shopping;using stairs;yard work reports she does gardening, has rollator, single point cane, and manual w/c; reports she uses rollator part of the time  -     Existing Precautions/Restrictions  fall  -     Barriers to Rehab  medically complex  -     Row Name 10/08/21 1545          Living Environment    Lives With  significant other  -     Row Name 10/08/21 1545          Home Main Entrance    Number of Stairs, Main Entrance  four  -     Stair Railings, Main Entrance  railings on both sides of stairs  -     Row Name 10/08/21 1545          Stairs Within Home, Primary    Number of Stairs, Within Home, Primary  none  -     Row Name 10/08/21 1545          Cognition    Orientation Status (Cognition)  oriented x 4  -     Row Name 10/08/21 1545          Safety Issues, Functional Mobility    Safety Issues Affecting Function (Mobility)  friction/shear risk;safety precaution awareness  -     Impairments Affecting Function (Mobility)  balance;endurance/activity tolerance;sensation/sensory awareness;strength  -     Row Name 10/08/21 1545          Relationship/Environment    Name(s) of Who Lives With Patient  lives w/ boyfriend - Dmitriy  -       User Key  (r) = Recorded By, (t) = Taken By, (c) = Cosigned By    Initials Name Provider Type    Amairani Ricketts, PT Physical Therapist        Mobility     Row Name 10/08/21 1545          Bed Mobility    Bed Mobility  supine-sit  -JE     Supine-Sit [place] (Bed Mobility)  minimum assist (75% patient effort);verbal cues  -     Assistive Device (Bed Mobility)  head of bed elevated  -     Comment (Bed Mobility)  increase time to complete w/ increase effort  -     Row Name 10/08/21 1545          Sit-Stand Transfer     "Sit-Stand [place] (Transfers)  contact guard;verbal cues  -     Row Name 10/08/21 1545          Gait/Stairs (Locomotion)    [place] Level (Gait)  contact guard;minimum assist (75% patient effort)  -     Assistive Device (Gait)  other (see comments) w/ and w/out rwx  -     Distance in Feet (Gait)  30 ft  -     Deviations/Abnormal Patterns (Gait)  gait speed decreased;stride length decreased  -     Bilateral Gait Deviations  forward flexed posture;heel strike decreased  -       User Key  (r) = Recorded By, (t) = Taken By, (c) = Cosigned By    Initials Name Provider Type    Amairani Ricketts, PT Physical Therapist        Obj/Interventions     Row Name 10/08/21 1545          Range of Motion Comprehensive    General Range of Motion  no range of motion deficits identified  -[facility] Name 10/08/21 1545          Strength Comprehensive (MMT)    Comment, General Manual Muscle Testing (MMT) Assessment  B UEs grossly 4/5, B LEs grossly 4+ to 5/5 except B hip flexors 4- to 3+/5  -[facility] Name 10/08/21 1545          Motor Skills    Motor Skills  other (see comments) intact to FTN, finger opposition, heel to shin and JEANETTE  -[facility] Name 10/08/21 1545          Balance    Balance Assessment  sitting static balance;sitting dynamic balance;standing static balance;standing dynamic balance  -     Static Sitting Balance  WFL;unsupported;sitting, edge of bed;supported;sitting in chair  -     Dynamic Sitting Balance  WFL;supported;sitting, edge of bed  -     Static Standing Balance  mild impairment;supported;unsupported  -     Dynamic Standing Balance  mild impairment;supported;standing  -     Row Name 10/08/21 1545          Sensory Assessment (Somatosensory)    Sensory Assessment (Somatosensory)  other (see comments) reports N/T B feet, \"I have neuropathy\"  -       User Key  (r) = Recorded By, (t) = Taken By, (c) = Cosigned By    Initials Name Provider Type    Amairani Ricketts, PT Physical " Therapist        Goals/Plan     Row Name 10/08/21 1545          Bed Mobility Goal 1 (PT)    Activity/Assistive Device (Bed Mobility Goal 1, PT)  rolling to left;rolling to right;bridging;scooting;sit to supine/supine to sit;sidelying to sit/sit to sidelying  -JE     [place] Level/Cues Needed (Bed Mobility Goal 1, PT)  independent  -JE     Time Frame (Bed Mobility Goal 1, PT)  long term goal (LTG);10 days  -JE     Progress/Outcomes (Bed Mobility Goal 1, PT)  goal ongoing  -     Row Name 10/08/21 1545          Transfer Goal 1 (PT)    Activity/Assistive Device (Transfer Goal 1, PT)  sit-to-stand/stand-to-sit;bed-to-chair/chair-to-bed  -JE     [place] Level/Cues Needed (Transfer Goal 1, PT)  standby assist  -JE     Time Frame (Transfer Goal 1, PT)  long term goal (LTG);10 days  -JE     Progress/Outcome (Transfer Goal 1, PT)  goal ongoing  -     Row Name 10/08/21 1545          Gait Training Goal 1 (PT)    Activity/Assistive Device (Gait Training Goal 1, PT)  gait (walking locomotion);assistive device use;decrease fall risk;diminish gait deviation;improve balance and speed;increase endurance/gait distance;walker, rolling  -JE     [place] Level (Gait Training Goal 1, PT)  standby assist  -JE     Distance (Gait Training Goal 1, PT)  100 ft w/ less than or equal to 1 standing rest  -JE     Time Frame (Gait Training Goal 1, PT)  long term goal (LTG);10 days  -JE     Progress/Outcome (Gait Training Goal 1, PT)  goal ongoing  -     Row Name 10/08/21 1545          Stairs Goal 1 (PT)    Activity/Assistive Device (Stairs Goal 1, PT)  ascending stairs;descending stairs;using handrail, left;using handrail, right;step-to-step;decrease fall risk;improve balance and speed  -JE     [place] Level/Cues Needed (Stairs Goal 1, PT)  contact guard assist  -JE     Number of Stairs (Stairs Goal 1, PT)  4  -JE     Time Frame (Stairs Goal 1, PT)  long term goal (LTG);10 days  -JE     Progress/Outcome (Stairs Goal 1, PT)   "goal ongoing  -       User Key  (r) = Recorded By, (t) = Taken By, (c) = Cosigned By    Initials Name Provider Type    Amairani Ricketts, PT Physical Therapist        Clinical Impression     Row Name 10/08/21 1545          Pain    Additional Documentation  Pain Scale: Numbers Pre/Post-Treatment (Group)  -     Row Name 10/08/21 1545          Pain Scale: Numbers Pre/Post-Treatment    Pretreatment Pain Rating  0/10 - no pain  -     Posttreatment Pain Rating  8/10 reports pain now because she is up in chair w/ wt on her bottom  -     Pre/Posttreatment Pain Comment  reports her R buttock is raw, \"I've got diarrhea; that just burns me up\"; RN aware reporting they had put a protective cream on the affected area prior to therapy arriving  -     Pain Intervention(s)  Repositioned;Rest  -     Row Name 10/08/21 1541          Plan of Care Review    Plan of Care Reviewed With  patient  -     Progress  improving  -     Outcome Summary  PT eval completed.  Pt pleasant and agreeable to therapy.  Pt demonstrates generalized weakness and decrease activity tolerance now requiring assist to perform functional mobility w/ decrease balance requiring use of rwx noting increase fall risk w/ decrease gait mechanics.  Pt tolerated ~ 30 ft of gait this date w/ decrease step length, foot clearance, heel strike and flexed posture.  Pt will benefit from continued PT services to improve activity tolerance, safety awareness, balance, strength, and I w/ functional mobility reducing fall risk.  Recommend continued skilled care at discharge.  Pt believes they are working on getting her into a SNF for rehab. Will follow for progress and needs.  -     Row Name 10/08/21 1545          Therapy Assessment/Plan (PT)    Patient/Family Therapy Goals Statement (PT)  improve mobility  -     Rehab Potential (PT)  good, to achieve stated therapy goals  -     Criteria for Skilled Interventions Met (PT)  yes;meets criteria;skilled treatment " is necessary  -JE     Predicted Duration of Therapy Intervention (PT)  until discharge or goals achieved  -SUKUMAR     Row Name 10/08/21 1545          Vital Signs    Pre Systolic BP Rehab  155  -JE     Pre Treatment Diastolic BP  78  -JE     Post Systolic BP Rehab  151  -JE     Post Treatment Diastolic BP  67 pt concerned that BP too elevated and may need a dose of clonidine - RN notified who is watching blood pressure and is aware  -JE     Pretreatment Heart Rate (beats/min)  66  -JE     Intratreatment Heart Rate (beats/min)  66  -JE     Posttreatment Heart Rate (beats/min)  65  -JE     Pretreatment Resp Rate (breaths/min)  17  -JE     Intratreatment Resp Rate (breaths/min)  15  -JE     Posttreatment Resp Rate (breaths/min)  15  -JE     Pre SpO2 (%)  97  -JE     O2 Delivery Pre Treatment  room air  -JE     Intra SpO2 (%)  98  -JE     O2 Delivery Intra Treatment  room air  -JE     Post SpO2 (%)  98  -JE     Pre Patient Position  Supine  -JE     Intra Patient Position  Standing  -JE     Post Patient Position  Sitting  -     Row Name 10/08/21 1545          Positioning and Restraints    Pre-Treatment Position  in bed  -JE     Post Treatment Position  chair  -JE     In Chair  notified nsg;reclined;call light within reach;encouraged to call for assist;legs elevated;patient within staff view pillow under R buttock w/ wt shifted onto L hip/buttock  -       User Key  (r) = Recorded By, (t) = Taken By, (c) = Cosigned By    Initials Name Provider Type    Amairani Ricketts, PT Physical Therapist        Outcome Measures     Row Name 10/08/21 1545          How much help from another person do you currently need...    Turning from your back to your side while in flat bed without using bedrails?  3  -JE     Moving from lying on back to sitting on the side of a flat bed without bedrails?  3  -JE     Moving to and from a bed to a chair (including a wheelchair)?  3  -JE     Standing up from a chair using your arms (e.g., wheelchair,  bedside chair)?  3  -SUKUMAR     Climbing 3-5 steps with a railing?  3  -JE     To walk in hospital room?  3  -     AM-PAC 6 Clicks Score (PT)  18  -SUKUMAR     Row Name 10/08/21 1545 10/08/21 0734       Functional Assessment    Outcome Measure Options  AM-PAC 6 Clicks Basic Mobility (PT)  -  AM-PAC 6 Clicks Daily Activity (OT)  -      User Key  (r) = Recorded By, (t) = Taken By, (c) = Cosigned By    Initials Name Provider Type    Amairnai Ricketts, PT Physical Therapist    Desire Villa, OTR/L Occupational Therapist                       Physical Therapy Education                 Title: PT OT SLP Therapies (In Progress)     Topic: Physical Therapy (In Progress)     Point: Mobility training (Done)     Learning Progress Summary           Patient Acceptance, E,TB,D, BULMARO,SELWYN,NR by SUKUMAR at 10/8/2021 1623    Comment: Education re: purpose of PT/importance of activity, for safety/falls prevention, for use of rwx to improve stability to reduce fall risk. for improved tech w/ bed mob, tfers and gait w/ use of rwx and for positioning/skin protection/pressure relief                   Point: Home exercise program (Not Started)     Learner Progress:  Not documented in this visit.          Point: Precautions (Done)     Learning Progress Summary           Patient Acceptance, E,TB,D, BULMARO,DU,NR by SUKUMAR at 10/8/2021 1623    Comment: Education re: purpose of PT/importance of activity, for safety/falls prevention, for use of rwx to improve stability to reduce fall risk. for improved tech w/ bed mob, tfers and gait w/ use of rwx and for positioning/skin protection/pressure relief                               User Key     Initials Effective Dates Name Provider Type [facility] 08/02/18 -  Amairani Nieves, PT Physical Therapist PT              PT Recommendation and Plan  Planned Therapy Interventions (PT): balance training, bed mobility training, gait training, home exercise program, patient/family education, postural re-education,  ROM (range of motion), stair training, strengthening, transfer training, other (see comments) (safety/falls prevention, skin protection/pressure relief)  Plan of Care Reviewed With: patient  Progress: improving  Outcome Summary: PT eval completed.  Pt pleasant and agreeable to therapy.  Pt demonstrates generalized weakness and decrease activity tolerance now requiring assist to perform functional mobility w/ decrease balance requiring use of rwx noting increase fall risk w/ decrease gait mechanics.  Pt tolerated ~ 30 ft of gait this date w/ decrease step length, foot clearance, heel strike and flexed posture.  Pt will benefit from continued PT services to improve activity tolerance, safety awareness, balance, strength, and I w/ functional mobility reducing fall risk.  Recommend continued skilled care at discharge.  Pt believes they are working on getting her into a SNF for rehab. Will follow for progress and needs.     Time Calculation:   PT Charges     Row Name 10/08/21 1640             Time Calculation    Start Time  1545  -      Stop Time  1639  -      Time Calculation (min)  54 min  -      PT Received On  10/08/21  -      PT Goal Re-Cert Due Date  10/18/21  -        User Key  (r) = Recorded By, (t) = Taken By, (c) = Cosigned By    Initials Name Provider Type    Amairani Ricketts, PT Physical Therapist        Therapy Charges for Today     Code Description Service Date Service Provider Modifiers Qty    29856341399 HC PT EVAL MOD COMPLEXITY 4 10/8/2021 Amairani Nieves, PT GP 1          PT G-Codes  Outcome Measure Options: AM-PAC 6 Clicks Basic Mobility (PT)  AM-PAC 6 Clicks Score (PT): 18  AM-PAC 6 Clicks Score (OT): 21    Amairani Nieves PT  10/8/2021

## 2021-10-08 NOTE — CASE MANAGEMENT/SOCIAL WORK
Continued Stay Note   [place]     Patient Name: Ena Upton  MRN: [id number]  Today's Date: 10/8/2021    Admit Date: 10/4/2021    Discharge Plan     Row Name 10/08/21 0950       Plan    Plan  SNF placment    Patient/Family in Agreement with Plan  yes    Plan Comments  SW spoke with patient this am.  Patient was very lethargic but did not her head in agreement and state yes when SW advised SNF referrals had been made and were pending.  Patient nodded that she was in agreement with going to SNF for rehab when she discharges.  Referrals currently pending with [facility] and [facility].  [facility] is unable to offer at this time as they do not have any beds available.        Discharge Codes    No documentation.             MICHELLE Campbell

## 2021-10-08 NOTE — PROGRESS NOTES
[facility] Medicine Services  INPATIENT PROGRESS NOTE    Patient Name: Ena Upton  Date of Admission: 10/4/2021  Today's Date: 10/08/21  Length of Stay: 3  Primary Care Physician: Yaron Wiggins APRN    Subjective   Chief Complaint: f/u Encephalopathy/HHS    HPI:  Patient sitting up in chair at bedside.  On room air.  States she has a slight cough this morning but otherwise feels fine.  No chest pain.  No shortness of breath.  No nausea or vomiting.  Tolerating p.o.  Remains on low-dose Cardene drip overnight.  No other acute issues or events.    Review of Systems   All pertinent negatives and positives are as above. All other systems have been reviewed and are negative unless otherwise stated.     Objective    Temp:  [98.5 °F (36.9 °C)-98.6 °F (37 °C)] 98.5 °F (36.9 °C)  Heart Rate:  [76-92] 79  Resp:  [14-20] 20  BP: (145-212)/(57-91) 158/70  Physical Exam  GEN: Awake, alert, interactive, in NAD  HEENT: PERRLA, EOMI, Anicteric, Trachea midline  Lungs:  no wheezing/rales/rhonchi  Heart: RRR, +S1/s2, no rub  ABD: soft, nt/nd, +BS, no guarding/rebound  Extremities: atraumatic, no cyanosis  Skin: no rashes or lesions  Neuro: AAOx3, no focal deficits  Psych: normal mood & affect        Results Review:  I have reviewed the labs, radiology results, and diagnostic studies.    Laboratory Data:   Results from last 7 days   Lab Units 10/08/21  0221 10/07/21  0327 10/05/21  2309   WBC 10*3/mm3 4.02 6.06 10.03   HEMOGLOBIN g/dL 9.1* 9.5* 10.6*   HEMATOCRIT % 28.7* 29.2* 30.5*   PLATELETS 10*3/mm3 91* 128* 125*        Results from last 7 days   Lab Units 10/07/21  0327 10/05/21  2309 10/05/21  2029 10/05/21  0224 10/04/21  2344   SODIUM mmol/L 127* 128* 129*   < > 104*   POTASSIUM mmol/L 3.5 3.6 3.5   < > 4.8   CHLORIDE mmol/L 95* 93* 93*   < > 67*   CO2 mmol/L 22.0 23.0 23.0   < > 17.0*   BUN mg/dL 22* 18 18   < > 45*   CREATININE mg/dL 3.79* 2.88* 2.84*   < > 4.87*   CALCIUM  mg/dL 8.3* 8.4* 8.1*   < > 8.4*   BILIRUBIN mg/dL  --   --   --   --  0.6   ALK PHOS U/L  --   --   --   --  167*   ALT (SGPT) U/L  --   --   --   --  7   AST (SGOT) U/L  --   --   --   --  15   GLUCOSE mg/dL 81 99 106*   < > >1,500*    < > = values in this interval not displayed.       Culture Data:   Blood Culture   Date Value Ref Range Status   10/05/2021 No growth at 24 hours  Preliminary   10/04/2021 No growth at 24 hours  Preliminary       Radiology Data:   Imaging Results (Last 24 Hours)     ** No results found for the last 24 hours. **          I have reviewed the patient's current medications.     Assessment/Plan     Active Hospital Problems    Diagnosis    • **Hyperosmolar hyperglycemic state (HHS) (HCC)    • Hyponatremia    • Uremic encephalopathy syndrome    • Hypertensive emergency    • Hypothyroidism    • End stage kidney disease (HCC)        #1  HHS -resolved and now off insulin drip.  Continue with Levemir and sliding scale.  Hypoglycemia protocol.      #2  Encephalopathy -presented altered in the setting of HHS and missed dialysis.  Also potentially in the setting of hypertension.  Suspect combination of these 3 things.  Doing much better for the last 48 hours.  Head CT 10/5 without any obvious acute issues.  Continue supportive care.  Continue to treat blood pressure, sugars, kidneys.  HD per nephro.    #3 ESRD - Next due tomorrow, nephro following    #4 hypertensive emergency -remains on Cardene drip but overall bp trend improved with starting orals, will continue back to home meds and wean off gtt.     #5+ hCG -patient had a faint positive urine hCG on arrival.  Quantitative was done and mildly elevated at 6.5.  Patient has a history of tubal ligation and states she is not sexually active.  Highly doubt pregnancy.  A transvaginal ultrasound was ordered on 10/5 but patient was unable to consent to it and just had a pelvic done.  It showed an atrophic small uterus.  Unfortunately ovaries were not  demonstrated on the transabdominal and that is the other potential concern as she could have a malignancy/hormone secreting tumor.  She underwent a CT of the abdomen pelvis with contrast today that does not show any masses.  Ovaries were not clearly identified.  Her uterus is very small and atrophic to just potentially residual cervix.  Adrenals were normal without any masses either.  A repeat quantitative hCG again today was at 6.5 and has not increased at all.  Again overall findings not consistent with pregnancy or tumor.    #6 hyponatremia - likely some hypervolemic affect from fluids with DKA protocol. Will monitor and suspect will improve with HD, recheck tomorrow.     #7 thrombocytopenia -platelet counts remain around 100 no signs of bleeding.  Monitor closely.    Discharge Planning: Goals to wean off Cardene drip and move out of the ICU potentially later on today.  Continue PT and OT.  Suspect SNF at discharge.  Patient is significantly weak.    Electronically signed by Adán Merrill DO, 10/08/21, 08:06 CDT.

## 2021-10-08 NOTE — PLAN OF CARE
Goal Outcome Evaluation:  Plan of Care Reviewed With: patient           Outcome Summary: OT eval completed. Pt is alert and oriented x4, and eager to participate in therapy eval this am. She demonstrates decreased balance, activity tolerance and strength. She was able to bring self to seated position at EOB with CGA and vcs. Set-up and SBA to don socks. CGA for sit <> stand t/f from EOB and in room mobility with rwx. Pt reported increased dizziness with mobility and was safely returned to sitting position in chair. She would benefit from skilled OT services to address these deficits. Recommend d/c home with assist and HH vs. short term rehab, pending pt progress.

## 2021-10-08 NOTE — PLAN OF CARE
Goal Outcome Evaluation:  Plan of Care Reviewed With: patient        Progress: improving  Outcome Summary: Pt upgraded to regular diet with thin liquids, consistent carb/renal restrictions. She was eating lunch well at time of RD visit. We did review diet recommendations for diet. Encouraged intake, encouraged compliance. Will continue to follow.

## 2021-10-08 NOTE — THERAPY EVALUATION
Patient Name: Ena Upton  : 1970    MRN: [id number]                              Today's Date: 10/8/2021       Admit Date: 10/4/2021    Visit Dx:     ICD-10-CM ICD-9-CM   1. Hyperosmolar hyperglycemic state (HHS) (HCC)  E11.00 250.22    E11.65    2. Altered mental status, unspecified altered mental status type  R41.82 780.97   3. Pneumonia due to infectious organism, unspecified laterality, unspecified part of lung  J18.9 486   4. Dysphagia, unspecified type  R13.10 787.20   5. Cognitive changes  R41.89 799.59   6. 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)   • Non-ketotic hyperosmolar coma (HCC)   • HTN (hypertension)   • Intractable nausea and vomiting   • Constipation   • Gastroparesis due to DM (HCC)   • Hyperglycemia   • Seizure (HCC)   • End stage kidney disease (HCC)   • Sacral insufficiency fracture   • Hypertensive urgency   • Uncontrolled diabetes mellitus (HCC)   • Hyperkalemia   • Altered mental status   • Troponin level elevated   • Hypertensive emergency   • Acute pulmonary edema (HCC)   • Hypothyroidism   • Hyperosmolar hyperglycemic state (HHS) (HCC)   • Uremic encephalopathy syndrome   • Hyponatremia     Past Medical History:   Diagnosis Date   • Anxiety    • Arthritis    • Chronic kidney disease     STAGE V RENAL FAILURE   • Depression    • Diabetes mellitus (HCC)    • HTN (hypertension)    • Hypothyroidism    • Obesity    • Tremors of nervous system      Past Surgical History:   Procedure Laterality Date   • ARTERIOVENOUS FISTULA     • BREAST BIOPSY     • CARPAL TUNNEL RELEASE     • CATH LAB PROCEDURE     • CHOLECYSTECTOMY     • TUBAL ABDOMINAL LIGATION       General Information     Row Name 10/08/21 0734          OT Time and Intention    Document Type  evaluation Pt presented to ED with AMS and fluid overload. PMH: hyperosmolar hyperglycemia state, uremic encephalopathy  syndrome.  -     Mode of Treatment  occupational therapy  -     Row Name 10/08/21 0734          General Information    Patient Profile Reviewed  yes  -     Prior Level of Function  independent:;all household mobility;community mobility;transfer;bed mobility;ADL's shower chair, w/c, rollator, cane  -     Existing Precautions/Restrictions  fall;oxygen therapy device and L/min  -     Barriers to Rehab  medically complex  -     Row Name 10/08/21 0734          Occupational Profile    Environmental Supports and Barriers (Occupational Profile)  tub shower with shower chair.  -     Row Name 10/08/21 0734          Living Environment    Lives With  alone  -     Row Name 10/08/21 0734          Home Main Entrance    Number of Stairs, Main Entrance  four  -     Stair Railings, Main Entrance  railings on both sides of stairs  -     Row Name 10/08/21 0734          Stairs Within Home, Primary    Number of Stairs, Within Home, Primary  none  -     Stair Railings, Within Home, Primary  none  -     Row Name 10/08/21 0734          Cognition    Orientation Status (Cognition)  oriented x 4  -     Row Name 10/08/21 0734          Safety Issues, Functional Mobility    Impairments Affecting Function (Mobility)  balance;endurance/activity tolerance;strength;sensation/sensory awareness  -       User Key  (r) = Recorded By, (t) = Taken By, (c) = Cosigned By    Initials Name Provider Type    Desire Kearns OTR/L Occupational Therapist          Mobility/ADL's     Row Name 10/08/21 0734          Bed Mobility    Bed Mobility  supine-sit  -     Supine-Sit [place] (Bed Mobility)  contact guard;verbal cues  -     Assistive Device (Bed Mobility)  head of bed elevated  -     Row Name 10/08/21 0734          Transfers    Transfers  sit-stand transfer  -     Sit-Stand [place] (Transfers)  contact guard  -     Row Name 10/08/21 0734          Functional Mobility    Functional Mobility- Ind. Level   contact guard assist  -     Functional Mobility- Device  rolling walker  -     Functional Mobility- Comment  bed to door and back to chair. Pt reported increased dizziness with mobility and was safely seated in chair.  -     Row Name 10/08/21 0734          Activities of Daily Living    BADL Assessment/Intervention  feeding;lower body dressing  -     Row Name 10/08/21 07          Self-Feeding Assessment/Training    [place] Level (Feeding)  feeding skills;independent  -     Position (Self-Feeding)  sitting up in bed  -     Row Name 10/08/21 07          Lower Body Dressing Assessment/Training    [place] Level (Lower Body Dressing)  don;socks;set up;standby assist  -     Position (Lower Body Dressing)  edge of bed sitting  -       User Key  (r) = Recorded By, (t) = Taken By, (c) = Cosigned By    Initials Name Provider Type    Desire Villa OTR/L Occupational Therapist        Obj/Interventions     Row Name 10/08/21 0734          Vision Assessment/Intervention    Visual Impairment/Limitations  WFL  -     Row Name 10/08/21 0734          Range of Motion Comprehensive    General Range of Motion  bilateral upper extremity ROM [facility]  -     Row Name 10/08/21 07          Strength Comprehensive (MMT)    Comment, General Manual Muscle Testing (MMT) Assessment  B UE strength 4/5  -     Row Name 10/08/21 0734          Balance    Balance Assessment  sitting static balance;standing static balance  -     Static Sitting Balance  WFL;unsupported;sitting, edge of bed  -     Static Standing Balance  mild impairment;supported;standing  -       User Key  (r) = Recorded By, (t) = Taken By, (c) = Cosigned By    Initials Name Provider Type    Desire Villa OTR/L Occupational Therapist        Goals/Plan     Row Name 10/08/21 0734          Transfer Goal 1 (OT)    Activity/Assistive Device (Transfer Goal 1, OT)  tub  -     [place] Level/Cues Needed (Transfer Goal 1, OT)  modified  independence  -JJ     Time Frame (Transfer Goal 1, OT)  long term goal (LTG);by discharge  -JJ     Progress/Outcome (Transfer Goal 1, OT)  goal ongoing  -     Row Name 10/08/21 0734          Bathing Goal 1 (OT)    Activity/Device (Bathing Goal 1, OT)  bathing skills, all  -     [place] Level/Cues Needed (Bathing Goal 1, OT)  modified independence with shower chair  -J     Time Frame (Bathing Goal 1, OT)  long term goal (LTG);by discharge  -J     Progress/Outcomes (Bathing Goal 1, OT)  goal ongoing  -     Row Name 10/08/21 0734          ROM Goal 1 (OT)    ROM Goal 1 (OT)  Pt will participate in functional task in standing for 8 minutes with no recovery periods to increase her tolerance for activity and independence with adls.  -     Time Frame (ROM Goal 1, OT)  long term goal (LTG);by discharge  -     Progress/Outcome (ROM Goal 1, OT)  goal ongoing  -     Row Name 10/08/21 0734          Therapy Assessment/Plan (OT)    Planned Therapy Interventions (OT)  activity tolerance training;BADL retraining;functional balance retraining;occupation/activity based interventions;patient/caregiver education/training;transfer/mobility retraining;strengthening exercise  -       User Key  (r) = Recorded By, (t) = Taken By, (c) = Cosigned By    Initials Name Provider Type    Desire Villa OTR/L Occupational Therapist        Clinical Impression     Row Name 10/08/21 0734          Pain Assessment    Additional Documentation  Pain Scale: Numbers Pre/Post-Treatment (Group)  -     Row Name 10/08/21 0734          Pain Scale: Numbers Pre/Post-Treatment    Pretreatment Pain Rating  0/10 - no pain  -     Pain Intervention(s)  Medication (See MAR);Ambulation/increased activity;Repositioned  -     Row Name 10/08/21 0734          Plan of Care Review    Plan of Care Reviewed With  patient  -     Outcome Summary  OT eval completed. Pt is alert and oriented x4, and eager to participate in therapy eval this am.  She demonstrates decreased balance, activity tolerance and strength. She was able to bring self to seated position at EOB with CGA and vcs. Set-up and SBA to don socks. CGA for sit <> stand t/f from EOB and in room mobility with rwx. Pt reported increased dizziness with mobility and was safely returned to sitting position in chair. She would benefit from skilled OT services to address these deficits. Recommend d/c home with assist and HH vs. short term rehab, pending pt progress.  -     Row Name 10/08/21 0734          Therapy Assessment/Plan (OT)    Patient/Family Therapy Goal Statement (OT)  return home  -     Rehab Potential (OT)  good, to achieve stated therapy goals  -     Criteria for Skilled Therapeutic Interventions Met (OT)  yes;skilled treatment is necessary  -     Therapy Frequency (OT)  3 times/wk  -     Predicted Duration of Therapy Intervention (OT)  10 days  -     Row Name 10/08/21 0734          Therapy Plan Review/Discharge Plan (OT)    Anticipated Discharge Disposition (OT)  home with assist;home with home health;sub acute care setting  -     Row Name 10/08/21 0734          Vital Signs    Pre Patient Position  Supine  -JJ     Intra Patient Position  Standing  -JJ     Post Patient Position  Sitting  -JJ     Row Name 10/08/21 0734          Positioning and Restraints    Pre-Treatment Position  in bed  -JJ     Post Treatment Position  chair  -JJ     In Chair  notified nsg;call light within reach;reclined;encouraged to call for assist;patient within staff view  -       User Key  (r) = Recorded By, (t) = Taken By, (c) = Cosigned By    Initials Name Provider Type    Desire Villa OTR/L Occupational Therapist        Outcome Measures     Row Name 10/08/21 0734          How much help from another is currently needed...    Putting on and taking off regular lower body clothing?  3  -JJ     Bathing (including washing, rinsing, and drying)  3  -JJ     Toileting (which includes using toilet  bed pan or urinal)  3  -JJ     Putting on and taking off regular upper body clothing  4  -JJ     Taking care of personal grooming (such as brushing teeth)  4  -JJ     Eating meals  4  -     AM-PAC 6 Clicks Score (OT)  21  -     Row Name 10/08/21 0734          Functional Assessment    Outcome Measure Options  AM-PAC 6 Clicks Daily Activity (OT)  -       User Key  (r) = Recorded By, (t) = Taken By, (c) = Cosigned By    Initials Name Provider Type    Desire Villa OTR/L Occupational Therapist          Occupational Therapy Education                 Title: PT OT SLP Therapies (In Progress)     Topic: Occupational Therapy (In Progress)     Point: ADL training (Done)     Description:   Instruct learner(s) on proper safety adaptation and remediation techniques during self care or transfers.   Instruct in proper use of assistive devices.              Learning Progress Summary           Patient Acceptance, E, VU by SAM at 10/8/2021 0812                   Point: Home exercise program (Not Started)     Description:   Instruct learner(s) on appropriate technique for monitoring, assisting and/or progressing therapeutic exercises/activities.              Learner Progress:  Not documented in this visit.          Point: Precautions (Not Started)     Description:   Instruct learner(s) on prescribed precautions during self-care and functional transfers.              Learner Progress:  Not documented in this visit.          Point: Body mechanics (Not Started)     Description:   Instruct learner(s) on proper positioning and spine alignment during self-care, functional mobility activities and/or exercises.              Learner Progress:  Not documented in this visit.                      User Key     Initials Effective Dates Name Provider Type Discipline     06/16/21 -  Desire Keita OTR/L Occupational Therapist OT              OT Recommendation and Plan  Planned Therapy Interventions (OT): activity tolerance  training, BADL retraining, functional balance retraining, occupation/activity based interventions, patient/caregiver education/training, transfer/mobility retraining, strengthening exercise  Therapy Frequency (OT): 3 times/wk  Plan of Care Review  Plan of Care Reviewed With: patient  Outcome Summary: OT eval completed. Pt is alert and oriented x4, and eager to participate in therapy eval this am. She demonstrates decreased balance, activity tolerance and strength. She was able to bring self to seated position at EOB with CGA and vcs. Set-up and SBA to don socks. CGA for sit <> stand t/f from EOB and in room mobility with rwx. Pt reported increased dizziness with mobility and was safely returned to sitting position in chair. She would benefit from skilled OT services to address these deficits. Recommend d/c home with assist and HH vs. short term rehab, pending pt progress.     Time Calculation:   Time Calculation- OT     Row Name 10/08/21 0812             Time Calculation- OT    OT Start Time  0734 add 10 minutes for chart review and attempted eval on 10/7 from [id number]  -      OT Stop Time  0806  -      OT Time Calculation (min)  32 min  -      OT Received On  10/08/21  -      OT Goal Re-Cert Due Date  10/18/21  -        User Key  (r) = Recorded By, (t) = Taken By, (c) = Cosigned By    Initials Name Provider Type    Desire Villa OTR/L Occupational Therapist        Therapy Charges for Today     Code Description Service Date Service Provider Modifiers Qty    [id number]  OT EVAL LOW COMPLEXITY 3 10/8/2021 Desire Keita OTR/L GO 1               MIMI Summers  10/8/2021

## 2021-10-08 NOTE — THERAPY DISCHARGE NOTE
Acute Care - Speech Language Pathology   Swallow Treatment Note/Discharge [facility]     Patient Name: Ena Upton  : 1970  MRN: [id number]  Today's Date: 10/8/2021               Admit Date: 10/4/2021  ST tx completed. Pt sitting upright in bedside chair with lunch tray of regular solids and thin liquids. No overt s/s of aspiration observed with current diet. RN reports no increased lung changes. ST to d/c from speech services. MD to reconsult if change or new concern.  Corazon Alatorre, CCC-SLP 10/8/2021 12:56 CDT      Visit Dx:    ICD-10-CM ICD-9-CM   1. Hyperosmolar hyperglycemic state (HHS) (HCC)  E11.00 250.22    E11.65    2. Altered mental status, unspecified altered mental status type  R41.82 780.97   3. Pneumonia due to infectious organism, unspecified laterality, unspecified part of lung  J18.9 486   4. Dysphagia, unspecified type  R13.10 787.20   5. Cognitive changes  R41.89 799.59   6. 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)   • Non-ketotic hyperosmolar coma (HCC)   • HTN (hypertension)   • Intractable nausea and vomiting   • Constipation   • Gastroparesis due to DM (HCC)   • Hyperglycemia   • Seizure (HCC)   • End stage kidney disease (HCC)   • Sacral insufficiency fracture   • Hypertensive urgency   • Uncontrolled diabetes mellitus (HCC)   • Hyperkalemia   • Altered mental status   • Troponin level elevated   • Hypertensive emergency   • Acute pulmonary edema (HCC)   • Hypothyroidism   • Hyperosmolar hyperglycemic state (HHS) (HCC)   • Uremic encephalopathy syndrome   • Hyponatremia     Past Medical History:   Diagnosis Date   • Anxiety    • Arthritis    • Chronic kidney disease     STAGE V RENAL FAILURE   • Depression    • Diabetes mellitus (HCC)    • HTN (hypertension)    • Hypothyroidism    • Obesity    • Tremors of nervous system      Past Surgical History:   Procedure  Laterality Date   • ARTERIOVENOUS FISTULA     • BREAST BIOPSY     • CARPAL TUNNEL RELEASE     • CATH LAB PROCEDURE     • CHOLECYSTECTOMY     • TUBAL ABDOMINAL LIGATION         SLP Recommendation and Plan                    Anticipated Discharge Disposition (SLP): unknown (10/08/21 1255)              Daily Summary of Progress (SLP): prepare for discharge (10/08/21 1233)  Plan for Continued Treatment (SLP): dc from speech services (10/08/21 1233)  Anticipated Discharge Disposition (SLP): unknown (10/08/21 1255)        Reason for Discharge: all goals and outcomes met, no further needs identified (10/08/21 1255)    Plan of Care Reviewed With: patient (10/08/21 1253)  Progress: improving (10/08/21 1253)  Outcome Summary: ST tx completed. Pt sitting upright in bedside chair with lunch tray of regular solids and thin liquids. No overt s/s of aspiration observed with current diet. RN reports no increased lung changes. ST to d/c from speech services. MD to reconsult if change or new concern. (10/08/21 1253)       SWALLOW EVALUATION (last 72 hours)      SLP Adult Swallow Evaluation     Row Name 10/08/21 1233 10/07/21 1237 10/06/21 0950             Rehab Evaluation    Document Type  discharge treatment  -BN  --  evaluation  -MM      Subjective Information  no complaints  -BN  --  no complaints  -MM      Patient Observations  alert;cooperative  -BN  --  lethargic;poorly cooperative  -MM      Patient/Family/Caregiver Comments/Observations  no family present  -BN  --  No family present.   -MM      Care Plan Review  care plan/treatment goals reviewed  -BN  --  evaluation/treatment results reviewed;current/potential barriers reviewed;patient/other agree to care plan  -MM      Care Plan Review, Other Participant(s)  caregiver  -BN  --  other (see comments) KIRSTIN Worley   -MM      Patient Effort  good  -BN  --  poor  -MM      Symptoms Noted During/After Treatment  --  --  none  -MM         General Information    Patient Profile Reviewed   --  --  yes  -MM      Pertinent History Of Current Problem  --  --  Primary problem: Altered mental status. Medical history: CKD, DM, HTN, hypothyroidism, obesity, tremors, arthritis, anxiety.   -MM      Current Method of Nutrition  --  NPO  -MM  NPO  -MM      Precautions/Limitations, Vision  --  --  other (see comments) eyes remained closed   -MM      Precautions/Limitations, Hearing  --  --  other (see comments) Difficulty following commands  -MM      Prior Level of Function-Communication  --  WFL  -MM  unknown  -MM      Prior Level of Function-Swallowing  --  no diet consistency restrictions  -MM  unknown  -MM      Plans/Goals Discussed with  --  --  patient;other (see comments);agreed upon RN   -MM      Barriers to Rehab  --  --  cognitive status  -MM      Patient's Goals for Discharge  --  return to PO diet  -MM  patient could not state  -MM         Pain    Additional Documentation  --  --  Pain Scale: FACES Pre/Post-Treatment (Group)  -MM         Pain Scale: FACES Pre/Post-Treatment    Pain: FACES Scale, Pretreatment  0-->no hurt  -BN  --  0-->no hurt  -MM      Posttreatment Pain Rating  0-->no hurt  -BN  --  0-->no hurt  -MM         Oral Motor Structure and Function    Dentition Assessment  --  natural, present and adequate;poor oral hygiene  -MM  natural, present and adequate;poor oral hygiene  -MM      Secretion Management  --  WNL/WFL  -MM  dried secretions in oral cavity  -MM      Mucosal Quality  --  moist, healthy  -MM  dry;sticky  -MM      Volitional Swallow  --  WFL  -MM  unable to elicit  -MM      Volitional Cough  --  WFL  -MM  unable to elicit  -MM         Oral Musculature and Cranial Nerve Assessment    Oral Motor General Assessment  --  WFL  -MM  other (see comments)  -MM      Oral Motor, Comment  --  --  Unable to fully assess  -MM         General Eating/Swallowing Observations    Respiratory Support Currently in Use  --  nasal cannula  -MM  nasal cannula  -MM      Eating/Swallowing Skills  --   self-fed  -MM  fed by SLP  -MM      Positioning During Eating  --  upright in bed  -MM  upright in bed  -MM      Utensils Used  --  spoon;straw  -MM  spoon;straw  -MM      Consistencies Trialed  --  pureed;honey-thick liquids;nectar/syrup-thick liquids;thin liquids;regular textures  -MM  pureed;thin liquids  -MM         Clinical Swallow Eval    Oral Prep Phase  --  WFL  -MM  impaired  -MM      Oral Transit  --  WFL  -MM  impaired  -MM      Oral Residue  --  WFL  -MM  impaired  -MM      Pharyngeal Phase  --  no overt signs/symptoms of pharyngeal impairment  -MM  no overt signs/symptoms of pharyngeal impairment  -MM      Esophageal Phase  --  unremarkable  -MM  unremarkable  -MM      Clinical Swallow Evaluation Summary  --  See note  -MM  See note  -MM         Oral Prep Concerns    Oral Prep Concerns  --  --  oral holding  -MM      Oral Holding  --  --  pudding  -MM         Oral Transit Concerns    Oral Transit Concerns  --  --  delayed initiation of bolus transit  -MM      Delayed Intiation of Bolus Transit  --  --  pudding  -MM         Oral Residue Concerns    Oral Residue Concerns  --  --  diffuse residue throughout oral cavity  -MM      Diffuse Residue Throughout Oral Cavity  --  --  pudding  -MM         Clinical Impression    Daily Summary of Progress (SLP)  prepare for discharge  -BN  progress toward functional goals as expected  -MM  --      Barriers to Overall Progress (SLP)  n/a  -BN  None  -MM  Cognition   -MM      SLP Swallowing Diagnosis  --  functional oral phase;R/O pharyngeal dysphagia  -MM  moderate;oral dysphagia;R/O pharyngeal dysphagia  -MM      Functional Impact  --  risk of aspiration/pneumonia  -MM  risk of aspiration/pneumonia;risk of malnutrition;risk of dehydration  -MM      Rehab Potential/Prognosis, Swallowing  --  good, to achieve stated therapy goals  -MM  adequate, monitor progress closely  -MM      Swallow Criteria for Skilled Therapeutic Interventions Met  --  demonstrates skilled  criteria  -MM  demonstrates skilled criteria  -MM      Plan for Continued Treatment (SLP)  dc from speech services  -BN  --  --         Recommendations    Therapy Frequency (Swallow)  --  PRN  -MM  at least;2 days per week  -MM      Predicted Duration Therapy Intervention (Days)  --  2 days  -MM  until discharge  -MM      SLP Diet Recommendation  --  regular textures;thin liquids  -MM  water between meals after oral care, with supervision;ice chips between meals after oral care, with supervision;NPO  -MM      Recommended Diagnostics  --  No further SLP services recommended  -MM  reassess via clinical swallow evaluation  -MM      Recommended Precautions and Strategies  --  upright posture during/after eating;small bites of food and sips of liquid;general aspiration precautions;fatigue precautions  -MM  upright posture during/after eating;small bites of food and sips of liquid;general aspiration precautions;fatigue precautions;1:1 supervision  -MM      Oral Care Recommendations  --  Oral Care BID/PRN;Toothbrush  -MM  Oral Care BID/PRN;Suction toothbrush  -MM      SLP Rec. for Method of Medication Administration  --  meds whole;as tolerated  -MM  meds crushed;with pudding or applesauce;as tolerated  -MM      Monitor for Signs of Aspiration  --  yes;notify SLP if any concerns  -MM  yes;notify SLP if any concerns  -MM      Anticipated Discharge Disposition (SLP)  --  --  unknown  -MM         Swallow Goals (SLP)    Oral Nutrition/Hydration Goal Selection (SLP)  oral nutrition/hydration, SLP goal 1  -BN  oral nutrition/hydration, SLP goal 1  -MM  oral nutrition/hydration, SLP goal 1  -MM         Oral Nutrition/Hydration Goal 1 (SLP)    Oral Nutrition/Hydration Goal 1, SLP  Patient will tolerate LRD without s/s of aspiration.  -BN  Patient will tolerate LRD without s/s of aspiration.  -MM  Patient will tolerate LRD without s/s of aspiration.  -MM      Time Frame (Oral Nutrition/Hydration Goal 1, SLP)  by discharge  -BN  by  discharge  -MM  by discharge  -MM      Barriers (Oral Nutrition/Hydration Goal 1, SLP)  cognition   -BN  cognition   -MM  cognition   -MM      Progress/Outcomes (Oral Nutrition/Hydration Goal 1, SLP)  goal met  -BN  goal ongoing  -MM  goal ongoing  -MM        User Key  (r) = Recorded By, (t) = Taken By, (c) = Cosigned By    Initials Name Effective Dates    Corazon Russo, CCC-SLP 06/16/21 -     MM Amy Guzman MS CCC-SLP 06/16/21 -           EDUCATION  The patient has been educated in the following areas:   Dysphagia (Swallowing Impairment).        SLP GOALS     Row Name 10/08/21 1233 10/07/21 1237 10/06/21 0950       Oral Nutrition/Hydration Goal 1 (SLP)    Oral Nutrition/Hydration Goal 1, SLP  Patient will tolerate LRD without s/s of aspiration.  -BN  Patient will tolerate LRD without s/s of aspiration.  -MM  Patient will tolerate LRD without s/s of aspiration.  -MM    Time Frame (Oral Nutrition/Hydration Goal 1, SLP)  by discharge  -BN  by discharge  -MM  by discharge  -MM    Barriers (Oral Nutrition/Hydration Goal 1, SLP)  cognition   -BN  cognition   -MM  cognition   -MM    Progress/Outcomes (Oral Nutrition/Hydration Goal 1, SLP)  goal met  -BN  goal ongoing  -MM  goal ongoing  -MM      User Key  (r) = Recorded By, (t) = Taken By, (c) = Cosigned By    Initials Name Provider Type    Corazon Russo, CCC-SLP Speech and Language Pathologist    Amy Oviedo, MS CCC-SLP Speech and Language Pathologist               Time Calculation:   Time Calculation- SLP     Row Name 10/08/21 1255             Time Calculation- SLP    SLP Start Time  1233  -BN      SLP Stop Time  1256  -BN      SLP Time Calculation (min)  23 min  -BN      SLP Received On  10/08/21  -BN         Untimed Charges    45915-IC Treatment Swallow Minutes  23  -BN         Total Minutes    Untimed Charges Total Minutes  23  -BN       Total Minutes  23  -BN        User Key  (r) = Recorded By, (t) = Taken By, (c) =  Cosigned By    Initials Name Provider Type    Corazon Russo CCC-SLP Speech and Language Pathologist          Therapy Charges for Today     Code Description Service Date Service Provider Modifiers Qty    [id number]  ST TREATMENT SWALLOW 2 10/8/2021 Corazon Alatorre CCC-SLP GN 1               SLP Discharge Summary  Anticipated Discharge Disposition (SLP): unknown  Reason for Discharge: all goals and outcomes met, no further needs identified  Progress Toward Achieving Short/long Term Goals: all goals met within established timelines  Discharge Destination: other (see comments) (still in acute care)    NAZANIN Hensley  10/8/2021

## 2021-10-09 NOTE — PROGRESS NOTES
[facility] Medicine Services  INPATIENT PROGRESS NOTE    Patient Name: Ena Upton  Date of Admission: 10/4/2021  Today's Date: 10/09/21  Length of Stay: 4  Primary Care Physician: Yaron Wiggins APRN    Subjective   Chief Complaint: f/u Encephalopathy/HHS    HPI:  Seen and examined.  Currently running on dialysis.  Says she feels well.  No chest pain or dizziness.  No nausea or vomiting.  Positive BMs.  She has now been off Cardene drip since yesterday afternoon.    Review of Systems   All pertinent negatives and positives are as above. All other systems have been reviewed and are negative unless otherwise stated.     Objective    Temp:  [97.3 °F (36.3 °C)-98.2 °F (36.8 °C)] 98.2 °F (36.8 °C)  Heart Rate:  [60-75] 61  Resp:  [10-21] 17  BP: (113-200)/(57-81) 164/71  Physical Exam  GEN: Awake, alert, interactive, in NAD  HEENT: PERRLA, EOMI, Anicteric, Trachea midline  Lungs:  no wheezing/rales/rhonchi  Heart: RRR, +S1/s2, no rub  ABD: soft, nt/nd, +BS, no guarding/rebound  Extremities: atraumatic, no cyanosis  Skin: no rashes or lesions  Neuro: AAOx3, no focal deficits  Psych: normal mood & affect        Results Review:  I have reviewed the labs, radiology results, and diagnostic studies.    Laboratory Data:   Results from last 7 days   Lab Units 10/09/21  0317 10/08/21  0221 10/07/21  0327   WBC 10*3/mm3 3.65 4.02 6.06   HEMOGLOBIN g/dL 8.5* 9.1* 9.5*   HEMATOCRIT % 28.0* 28.7* 29.2*   PLATELETS 10*3/mm3 103* 91* 128*        Results from last 7 days   Lab Units 10/09/21  0317 10/08/21  1125 10/07/21  0327 10/05/21  0224 10/04/21  2344   SODIUM mmol/L 129* 133* 127*   < > 104*   POTASSIUM mmol/L 3.8 3.8 3.5   < > 4.8   CHLORIDE mmol/L 94* 96* 95*   < > 67*   CO2 mmol/L 24.0 24.0 22.0   < > 17.0*   BUN mg/dL 23* 18 22*   < > 45*   CREATININE mg/dL 4.62* 3.52* 3.79*   < > 4.87*   CALCIUM mg/dL 8.3* 8.4* 8.3*   < > 8.4*   BILIRUBIN mg/dL  --  0.4  --   --  0.6   ALK PHOS  U/L  --  99  --   --  167*   ALT (SGPT) U/L  --  6  --   --  7   AST (SGOT) U/L  --  17  --   --  15   GLUCOSE mg/dL 182* 273* 81   < > >1,500*    < > = values in this interval not displayed.       Culture Data:   Blood Culture   Date Value Ref Range Status   10/05/2021 No growth at 24 hours  Preliminary   10/04/2021 No growth at 24 hours  Preliminary       Radiology Data:   Imaging Results (Last 24 Hours)     ** No results found for the last 24 hours. **          I have reviewed the patient's current medications.     Assessment/Plan     Active Hospital Problems    Diagnosis    • **Hyperosmolar hyperglycemic state (HHS) (HCC)    • Hyponatremia    • Uremic encephalopathy syndrome    • Hypertensive emergency    • Hypothyroidism    • End stage kidney disease (HCC)        #1  HHS -resolved and now off insulin drip.  Continue with Levemir and sliding scale.  Hypoglycemia protocol.      #2  Encephalopathy -presented altered in the setting of HHS and missed dialysis.  Also potentially in the setting of hypertension.  Suspect combination of these 3 things.  Doing much better for the last 48 hours.  Head CT 10/5 without any obvious acute issues.  Continue supportive care.  Continue to treat blood pressure, sugars, kidneys.  HD per nephro.    #3 ESRD -currently getting HD.  Further per nephro.  Currently on a schedule of Tuesday, Thursday, Saturday.    #4 hypertensive emergency -off Cardene drip.  Continue oral meds and adjust as needed.    #5+ hCG -patient had a faint positive urine hCG on arrival.  Quantitative was done and mildly elevated at 6.5.  Patient has a history of tubal ligation and states she is not sexually active.  Highly doubt pregnancy.  A transvaginal ultrasound was ordered on 10/5 but patient was unable to consent to it and just had a pelvic done.  It showed an atrophic small uterus.  Unfortunately ovaries were not demonstrated on the transabdominal and that is the other potential concern as she could have a  malignancy/hormone secreting tumor.  She underwent a CT of the abdomen pelvis with contrast today that does not show any masses.  Ovaries were not clearly identified.  Her uterus is very small and atrophic to just potentially residual cervix.  Adrenals were normal without any masses either.  A repeat quantitative hCG again today was at 6.5 and has not increased at all.  Again overall findings not consistent with pregnancy or tumor.    #6 hyponatremia - likely some hypervolemic affect from fluids with DKA protocol.  Did improve yesterday to 133.  Back down this morning but currently getting dialysis.  Will monitor and suspect will improve with HD, recheck tomorrow.     #7 thrombocytopenia -platelet counts remain around 100 no signs of bleeding.  Monitor closely.    Discharge Planning: Okay to transfer out of the ICU.  Continue PT OT.  Suspect SNF at discharge.    Electronically signed by Adán Merrill DO, 10/09/21, 07:53 CDT.

## 2021-10-09 NOTE — PLAN OF CARE
Goal Outcome Evaluation:           Progress: improving  Outcome Summary: Pt alert and oriented x4. BP in 120s systolic overnight with PO meds, no PRN or drip given. No UOP or BM overnight, bladder scan showed 3ml. Pt has been in chair twice during the shift. Pt is eating well, gave levemir last night. VSS. Continue to monitor.

## 2021-10-10 NOTE — PLAN OF CARE
Goal Outcome Evaluation:      Patient performed supine to sit with CGA. Min assist for sit to supine. Ambulated 65' with Rwx and decreased step length, decreased speed.

## 2021-10-10 NOTE — PROGRESS NOTES
Nephrology ([facility]) Progress Note      Patient:  Ena Upton  YOB: 1970  Date of Service: 10/10/2021  MRN: [id number]   Acct: [id number]   Primary Care Physician: Yaron Wiggins APRN  Advance Directive:   Code Status and Medical Interventions:   Ordered at: 10/05/21 1107     Level Of Support Discussed With:    Next of Kin (If No Surrogate)     Code Status:    No CPR     Medical Interventions (Level of Support Prior to Arrest):    Full     Comments:    sister-Pat     Admit Date: 10/4/2021       Hospital Day: 5  Referring Provider: No ref. provider found      Patient personally seen and examined.  Complete chart including Consults, Notes, Operative Reports, Labs, Cardiology, and Radiology studies reviewed as able.        Subjective:  Ena Upton is a 51 y.o. female for whom we were consulted for evaluation and treatment of end stage renal disease. Maintenance hemodialysis Tuesday Thursday Saturday at [facility]. Unknown last time she went to HD.  History of type 2 diabetes, hypertension, hypothyroidism.  Frequent hospitalizations due to noncompliance with dialysis and her other medical treatments. Was just discharged from [facility] a few weeks ago.  Presented this time with altered mental status, initial blood glucose was >1500. No other history of recent events is known. Hospital course remarkable for improving mental status with correction of hyperglycemia. Tolerated HD well on 10/5.     Today, her mental status is fine.  She denied current chest pain, shortness of air at rest, nausea vomiting.  She is off Cardene drip.  Tolerating diet.  Planning moved to floor.    Allergies:  Penicillins, Lamisil [terbinafine], Reglan [metoclopramide], and Stadol [butorphanol]    Home Meds:  Medications Prior to Admission   Medication Sig Dispense Refill Last Dose   • famotidine (PEPCID) 20 MG tablet Take 20 mg by mouth 2 (Two) Times a Day.      • gabapentin  (NEURONTIN) 300 MG capsule Take 300 mg by mouth 3 (Three) Times a Day.      • isosorbide mononitrate (IMDUR) 120 MG 24 hr tablet Take 120 mg by mouth Daily.      • lisinopril (PRINIVIL,ZESTRIL) 20 MG tablet Take 20 mg by mouth 2 (Two) Times a Day.      • nicotine (NICODERM CQ) 21 MG/24HR patch Place 1 patch on the skin as directed by provider Daily.      • albuterol (PROVENTIL HFA;VENTOLIN HFA) 108 (90 BASE) MCG/ACT inhaler Inhale 2 puffs Every 4 (Four) Hours As Needed for wheezing.   Unknown at Unknown time   • carvedilol (COREG) 25 MG tablet Take 1 tablet by mouth 2 (Two) Times a Day With Meals. 60 tablet 3    • cloNIDine (CATAPRES) 0.3 MG tablet Take 1 tablet by mouth Every 8 (Eight) Hours. 90 tablet 3    • DULoxetine (CYMBALTA) 60 MG capsule Take 60 mg by mouth daily.      • hydrALAZINE (APRESOLINE) 50 MG tablet Take 1.5 tablets by mouth 3 (Three) Times a Day. 120 tablet 3    • insulin aspart (novoLOG) 100 UNIT/ML injection Inject 10 Units under the skin into the appropriate area as directed 3 (Three) Times a Day Before Meals.      • Insulin Glargine (BASAGLAR KWIKPEN) 100 UNIT/ML injection pen Inject 40 Units under the skin into the appropriate area as directed Every Night.      • lactulose (CHRONULAC) 10 GM/15ML solution Take 20 g by mouth 3 (Three) Times a Day.      • levothyroxine (SYNTHROID, LEVOTHROID) 50 MCG tablet Take 1 tablet by mouth Daily. 30 tablet 3    • NIFEdipine XL (PROCARDIA XL) 60 MG 24 hr tablet Take 60 mg by mouth 2 (two) times a day.      • rOPINIRole (REQUIP) 4 MG tablet Take 4 mg by mouth Every Night.      • simvastatin (ZOCOR) 20 MG tablet Take 40 mg by mouth Daily.          Medicines:  Current Facility-Administered Medications   Medication Dose Route Frequency Provider Last Rate Last Admin   • carvedilol (COREG) tablet 25 mg  25 mg Oral BID With Meals Adán Merrill DO   25 mg at 10/10/21 3717   • cloNIDine (CATAPRES) tablet 0.3 mg  0.3 mg Oral Q8H Adán Merrill DO   0.3 mg at  10/10/21 1603   • dextrose (D50W) 25 g/ 50mL Intravenous Solution 12.5 g  12.5 g Intravenous PRN Adán Merrill DO   12.5 g at 10/05/21 2314   • dextrose (D50W) 25 g/ 50mL Intravenous Solution 25 g  25 g Intravenous Q15 Min PRN Adán Merrill DO       • dextrose (GLUTOSE) oral gel 15 g  15 g Oral Q15 Min PRN Adán Merrill DO       • enoxaparin (LOVENOX) syringe 30 mg  30 mg Subcutaneous Nightly Adán Merrill DO   30 mg at 10/09/21 2023   • famotidine (PEPCID) tablet 20 mg  20 mg Oral Daily Adán Merrill DO   20 mg at 10/10/21 0807   • glucagon (human recombinant) (GLUCAGEN DIAGNOSTIC) injection 1 mg  1 mg Subcutaneous Q15 Min PRN Adán Merrill DO       • hydrALAZINE (APRESOLINE) tablet 75 mg  75 mg Oral Q8H Adán Merrill DO   75 mg at 10/10/21 1603   • insulin detemir (LEVEMIR) injection 10 Units  10 Units Subcutaneous Nightly Adán Merrill DO   10 Units at 10/09/21 2023   • insulin lispro (humaLOG) injection 0-9 Units  0-9 Units Subcutaneous TID AC Adán Merrill DO   4 Units at 10/10/21 1649   • ipratropium-albuterol (DUO-NEB) nebulizer solution 3 mL  3 mL Nebulization Q6H PRN Adán Merrill DO       • isosorbide mononitrate (IMDUR) 24 hr tablet 60 mg  60 mg Oral Q24H Adán Merrill DO   60 mg at 10/10/21 0807   • labetalol (NORMODYNE,TRANDATE) injection 20 mg  20 mg Intravenous Q6H PRN Adán Merrill DO   20 mg at 10/09/21 1404   • levothyroxine (SYNTHROID, LEVOTHROID) tablet 50 mcg  50 mcg Oral Q AM Adán Merrill DO   50 mcg at 10/10/21 0520   • lisinopril (PRINIVIL,ZESTRIL) tablet 20 mg  20 mg Oral BID Adán Merrill DO   20 mg at 10/10/21 0807   • NIFEdipine XL (PROCARDIA XL) 24 hr tablet 60 mg  60 mg Oral Q24H Adán Merrill DO   60 mg at 10/10/21 0807   • ondansetron (ZOFRAN) injection 4 mg  4 mg Intravenous Q6H PRN Adán Merrill DO       • sodium chloride 0.9 % bolus 100 mL  100 mL Intravenous PRN Eron Blood MD       • sodium chloride 0.9  % flush 10 mL  10 mL Intravenous PRN Adán Merrill DO       • sodium chloride 0.9 % flush 10 mL  10 mL Intravenous Once PRN Adán Merrill DO       • sodium chloride 0.9 % flush 10 mL  10 mL Intravenous Q12H Adán Merrill DO   10 mL at 10/09/21 2043   • sodium chloride 0.9 % flush 10 mL  10 mL Intravenous PRN Adán Merrill DO       • sodium chloride 0.9 % flush 3 mL  3 mL Intravenous Q12H Adán Merrill DO   3 mL at 10/09/21 2043   • sodium chloride 0.9 % infusion  10 mL/hr Intravenous Continuous PRN Adán Merrill DO       • spironolactone (ALDACTONE) tablet 25 mg  25 mg Oral Daily Adán Merrill DO   25 mg at 10/10/21 0807       Past Medical History:  Past Medical History:   Diagnosis Date   • Anxiety    • Arthritis    • Chronic kidney disease     STAGE V RENAL FAILURE   • Depression    • Diabetes mellitus (HCC)    • HTN (hypertension)    • Hypothyroidism    • Obesity    • Tremors of nervous system        Past Surgical History:  Past Surgical History:   Procedure Laterality Date   • ARTERIOVENOUS FISTULA     • BREAST BIOPSY     • CARPAL TUNNEL RELEASE     • CATH LAB PROCEDURE     • CHOLECYSTECTOMY     • TUBAL ABDOMINAL LIGATION         Family History  Family History   Problem Relation Age of Onset   • Cancer Other    • Hypertension Other    • Kidney disease Other    • Heart disease Other    • Diabetes Other    • Diabetes Mother    • Diabetes Maternal Aunt    • Diabetes Maternal Grandmother        Social History  Social History     Socioeconomic History   • Marital status:    Tobacco Use   • Smoking status: Current Every Day Smoker     Packs/day: 0.50   • Smokeless tobacco: Never Used   • Tobacco comment: CUTTING BACK AND TRYING TO STOP SMOKING   Substance and Sexual Activity   • Alcohol use: No   • Drug use: No   • Sexual activity: Defer         Review of Systems:  History obtained from chart review and the patient  General ROS: No fever or chills  Respiratory ROS: No cough,  shortness of breath, wheezing  Cardiovascular ROS: No chest pain or palpitations  Gastrointestinal ROS: No abdominal pain or melena  Genito-Urinary ROS: No dysuria or hematuria  Psych ROS: No anxiety and depression    Objective:  Patient Vitals for the past 24 hrs:   BP Temp Temp src Pulse Resp SpO2 Weight   10/10/21 1714 113/72 -- -- 61 18 99 % --   10/10/21 1640 122/54 98.9 °F (37.2 °C) Oral 94 16 96 % --   10/10/21 1000 149/80 -- -- 62 20 99 % --   10/10/21 0900 131/77 -- -- 61 16 100 % --   10/10/21 0800 145/72 97.4 °F (36.3 °C) Oral 61 12 100 % --   10/10/21 0630 -- -- -- 63 -- 100 % --   10/10/21 0615 143/72 -- -- 65 -- 100 % --   10/10/21 0600 136/71 -- -- 64 11 100 % --   10/10/21 0545 133/67 -- -- 65 -- 99 % --   10/10/21 0530 124/68 -- -- 64 -- 97 % --   10/10/21 0515 132/74 -- -- 62 -- 99 % 76.1 kg (167 lb 12.3 oz)   10/10/21 0500 123/71 -- -- 64 15 100 % --   10/10/21 0445 134/75 -- -- 63 -- 100 % --   10/10/21 0430 145/71 -- -- 64 -- 99 % --   10/10/21 0415 135/72 -- -- 63 -- 98 % --   10/10/21 0400 136/67 97.8 °F (36.6 °C) Axillary 63 13 94 % --   10/10/21 0345 146/65 -- -- 63 -- 96 % --   10/10/21 0315 142/64 -- -- 63 -- 94 % --   10/10/21 0300 133/64 -- -- 62 -- 96 % --   10/10/21 0245 135/62 -- -- 63 -- 94 % --   10/10/21 0230 125/65 -- -- 63 -- 94 % --   10/10/21 0215 129/59 -- -- 62 -- 95 % --   10/10/21 0200 131/67 -- -- 62 15 95 % --   10/10/21 0145 131/63 -- -- 62 -- 95 % --   10/10/21 0130 139/63 -- -- 62 -- 96 % --   10/10/21 0115 137/62 -- -- 61 -- 96 % --   10/10/21 0100 132/61 -- -- 61 14 96 % --   10/10/21 0045 121/62 -- -- 61 -- 95 % --   10/10/21 0030 130/62 -- -- 60 -- 93 % --   10/10/21 0015 122/59 -- -- 60 -- 95 % --   10/10/21 0000 125/57 -- -- 61 13 96 % --   10/09/21 2345 117/57 98.6 °F (37 °C) Axillary 61 -- 95 % --   10/09/21 2330 135/59 -- -- 64 -- 98 % --   10/09/21 2315 127/59 -- -- 61 -- 95 % --   10/09/21 2300 126/60 -- -- 63 17 97 % --   10/09/21 2245 137/73 -- -- 63 --  100 % --   10/09/21 2230 134/74 -- -- 65 -- 99 % --   10/09/21 2215 141/68 -- -- 63 -- 100 % --   10/09/21 2200 139/70 -- -- 63 17 100 % --   10/09/21 2145 141/84 -- -- 64 -- 99 % --   10/09/21 2130 127/71 -- -- 63 -- 100 % --   10/09/21 2115 122/72 -- -- 60 -- 100 % --   10/09/21 2100 121/68 -- -- 61 15 99 % --   10/09/21 2045 123/68 -- -- 62 -- 98 % --   10/09/21 2030 127/66 -- -- 63 -- 98 % --   10/09/21 2015 134/72 -- -- 64 -- 95 % --   10/09/21 2000 133/73 97.7 °F (36.5 °C) Axillary 64 10 96 % --   10/09/21 1945 141/69 -- -- 65 -- 96 % --   10/09/21 1930 134/79 -- -- 64 -- 96 % --   10/09/21 1915 119/65 -- -- 62 -- 96 % --   10/09/21 1900 127/63 -- -- 64 -- 95 % --   10/09/21 1815 145/69 -- -- 64 20 99 % --       Intake/Output Summary (Last 24 hours) at 10/10/2021 1803  Last data filed at 10/10/2021 1300  Gross per 24 hour   Intake 650.93 ml   Output --   Net 650.93 ml     General: awake/alert   Chest:  clear to auscultation bilaterally without respiratory distress  CVS: regular rate and rhythm  Abdominal: soft, nontender, positive bowel sounds  Extremities: no cyanosis or edema  Skin: warm and dry without rash      Labs:  Results from last 7 days   Lab Units 10/10/21  0249 10/09/21  0317 10/08/21  0221   WBC 10*3/mm3 3.95 3.65 4.02   HEMOGLOBIN g/dL 8.6* 8.5* 9.1*   HEMATOCRIT % 28.3* 28.0* 28.7*   PLATELETS 10*3/mm3 120* 103* 91*         Results from last 7 days   Lab Units 10/10/21  0249 10/09/21  0317 10/08/21  1125 10/05/21  0224 10/04/21  2344   SODIUM mmol/L 133* 129* 133*   < > 104*   POTASSIUM mmol/L 4.1 3.8 3.8   < > 4.8   CHLORIDE mmol/L 96* 94* 96*   < > 67*   CO2 mmol/L 27.0 24.0 24.0   < > 17.0*   BUN mg/dL 17 23* 18   < > 45*   CREATININE mg/dL 3.48* 4.62* 3.52*   < > 4.87*   CALCIUM mg/dL 8.3* 8.3* 8.4*   < > 8.4*   BILIRUBIN mg/dL  --   --  0.4  --  0.6   ALK PHOS U/L  --   --  99  --  167*   ALT (SGPT) U/L  --   --  6  --  7   AST (SGOT) U/L  --   --  17  --  15   GLUCOSE mg/dL 190* 182* 273*    < > >1,500*    < > = values in this interval not displayed.       Radiology:   Imaging Results (Last 72 Hours)     ** No results found for the last 72 hours. **          Culture:  Blood Culture   Date Value Ref Range Status   10/05/2021 No growth at 4 days  Preliminary   10/04/2021 No growth at 4 days  Preliminary     Urine Culture   Date Value Ref Range Status   10/05/2021 <10,000 CFU/mL Mixed Polly Isolated  Final         Assessment   End-stage renal disease  Diabetes type 2 with diabetic nephropathy  Hypertension  Hyperosmolar hyperglycemic on admission  Hyponatremia due to volume overload/profound hyperglycemia  Metabolic acidosis  Acute metabolic encephalopathy  Anemia in chronic kidney disease  Secondary hyperparathyroidism  Hypothyroidism     Plan:  Hemodialysis Tuesday Thursday Saturday per routine scheduling  Monitor labs  Ultrafiltration as tolerated with dialysis  Continue to adjust bp meds as needed, has remained off Cardene drip and planning moved to floor  Started thyroid meds  Discussed with patient, nursing    Surjit Avitia MD  10/10/2021  18:03 CDT

## 2021-10-10 NOTE — PROGRESS NOTES
[facility]  INPATIENT PROGRESS NOTE    Patient Name: Ena Upton  Date of Admission: 10/4/2021  Today's Date: 10/10/21  Length of Stay: 5  Primary Care Physician: Yaron Wiggins APRN    Subjective   Chief Complaint: f/u Encephalopathy/HHS    HPI:  Patient is doing well this morning.  Blood pressure stable off Cardene drip.  She did briefly require Cardene drip again last night but meds were adjusted.  She feels well.  Has no headache.  No dizziness.  No change in vision.  No chest pain or shortness of breath.  Tolerating p.o.    Review of Systems   All pertinent negatives and positives are as above. All other systems have been reviewed and are negative unless otherwise stated.     Objective    Temp:  [97.7 °F (36.5 °C)-98.6 °F (37 °C)] 97.8 °F (36.6 °C)  Heart Rate:  [60-67] 63  Resp:  [10-20] 11  BP: (117-229)/(57-93) 143/72  Physical Exam  GEN: Awake, alert, interactive, in NAD  HEENT: PERRLA, EOMI, Anicteric, Trachea midline  Lungs:  no wheezing/rales/rhonchi  Heart: RRR, +S1/s2, no rub  ABD: soft, nt/nd, +BS, no guarding/rebound  Extremities: atraumatic, no cyanosis  Skin: no rashes or lesions  Neuro: AAOx3, no focal deficits  Psych: normal mood & affect    No interval change in exam from day prior, 10/9    Results Review:  I have reviewed the labs, radiology results, and diagnostic studies.    Laboratory Data:   Results from last 7 days   Lab Units 10/10/21  0249 10/09/21  0317 10/08/21  0221   WBC 10*3/mm3 3.95 3.65 4.02   HEMOGLOBIN g/dL 8.6* 8.5* 9.1*   HEMATOCRIT % 28.3* 28.0* 28.7*   PLATELETS 10*3/mm3 120* 103* 91*        Results from last 7 days   Lab Units 10/10/21  0249 10/09/21  0317 10/08/21  1125 10/05/21  0224 10/04/21  2344   SODIUM mmol/L 133* 129* 133*   < > 104*   POTASSIUM mmol/L 4.1 3.8 3.8   < > 4.8   CHLORIDE mmol/L 96* 94* 96*   < > 67*   CO2 mmol/L 27.0 24.0 24.0   < > 17.0*   BUN mg/dL 17 23* 18   < > 45*   CREATININE mg/dL  3.48* 4.62* 3.52*   < > 4.87*   CALCIUM mg/dL 8.3* 8.3* 8.4*   < > 8.4*   BILIRUBIN mg/dL  --   --  0.4  --  0.6   ALK PHOS U/L  --   --  99  --  167*   ALT (SGPT) U/L  --   --  6  --  7   AST (SGOT) U/L  --   --  17  --  15   GLUCOSE mg/dL 190* 182* 273*   < > >1,500*    < > = values in this interval not displayed.       Culture Data:   Blood Culture   Date Value Ref Range Status   10/05/2021 No growth at 24 hours  Preliminary   10/04/2021 No growth at 24 hours  Preliminary       Radiology Data:   Imaging Results (Last 24 Hours)     ** No results found for the last 24 hours. **          I have reviewed the patient's current medications.     Assessment/Plan     Active Hospital Problems    Diagnosis    • **Hyperosmolar hyperglycemic state (HHS) (HCC)    • Hyponatremia    • Uremic encephalopathy syndrome    • Hypertensive emergency    • Hypothyroidism    • End stage kidney disease (HCC)        #1  HHS -resolved and has been off insulin drip.  Continue with Levemir and sliding scale.  Hypoglycemia protocol.      #2  Encephalopathy -presented altered in the setting of HHS and missed dialysis.  Also potentially in the setting of hypertension.  Suspect combination of these 3 things. Head CT 10/5 without any obvious acute issues.  Now resolved and back to baseline.    #3 ESRD -continue HD on schedule Tuesday, Thursday, Saturday.  Appreciate nephro input.    #4 hypertensive emergency -now again off Cardene drip.  She is requiring multiple medications to keep her BP and check.  Doing better this morning.  Continue current regimen.    #5+ hCG -patient had a faint positive urine hCG on arrival.  Quantitative was done and mildly elevated at 6.5.  Patient has a history of tubal ligation and states she is not sexually active.  Highly doubt pregnancy.  A transvaginal ultrasound was ordered on 10/5 but patient was unable to consent to it and just had a pelvic done.  It showed an atrophic small uterus.  Unfortunately ovaries were  not demonstrated on the transabdominal and that is the other potential concern as she could have a malignancy/hormone secreting tumor.  She underwent a CT of the abdomen pelvis with contrast today that does not show any masses.  Ovaries were not clearly identified.  Her uterus is very small and atrophic to just potentially residual cervix.  Adrenals were normal without any masses either.  A repeat quantitative hCG again today was at 6.5 and has not increased at all.  Again overall findings not consistent with pregnancy or tumor.    #6 hyponatremia - likely some hypervolemia, back at 133 today after dialysis yesterday    #7 thrombocytopenia -platelet counts remain around 100 no signs of bleeding.  Monitor closely.    Discharge Planning: Okay to transfer out of the ICU.  Continue PT OT.  Plan for SNF at discharge.    Electronically signed by Adán Merrill DO, 10/10/21, 08:06 CDT.

## 2021-10-10 NOTE — PROGRESS NOTES
Nephrology ([facility]) Progress Note      Patient:  Ena Upton  YOB: 1970  Date of Service: 10/9/2021  MRN: [id number]   Acct: [id number]   Primary Care Physician: Yaron Wiggins APRN  Advance Directive:   Code Status and Medical Interventions:   Ordered at: 10/05/21 1107     Level Of Support Discussed With:    Next of Kin (If No Surrogate)     Code Status:    No CPR     Medical Interventions (Level of Support Prior to Arrest):    Full     Comments:    sister-Pat     Admit Date: 10/4/2021       Hospital Day: 4  Referring Provider: No ref. provider found      Patient personally seen and examined.  Complete chart including Consults, Notes, Operative Reports, Labs, Cardiology, and Radiology studies reviewed as able.        Subjective:  Ena Upton is a 51 y.o. female for whom we were consulted for evaluation and treatment of end stage renal disease. Maintenance hemodialysis Tuesday Thursday Saturday at [facility]. Unknown last time she went to HD.  History of type 2 diabetes, hypertension, hypothyroidism.  Frequent hospitalizations due to noncompliance with dialysis and her other medical treatments. Was just discharged from [facility] a few weeks ago.  Presented this time with altered mental status, initial blood glucose was >1500. No other history of recent events is known. Hospital course remarkable for improving mental status with correction of hyperglycemia. Tolerated HD well on 10/5.     Today, her mental status is improved.  She denied current chest pain, shortness of air at rest, nausea vomiting.  She is off Cardene drip but it will likely need to be restarted for blood pressure control.  HD completed today without issue    Allergies:  Penicillins, Lamisil [terbinafine], Reglan [metoclopramide], and Stadol [butorphanol]    Home Meds:  Medications Prior to Admission   Medication Sig Dispense Refill Last Dose   • famotidine (PEPCID) 20 MG tablet  Take 20 mg by mouth 2 (Two) Times a Day.      • gabapentin (NEURONTIN) 300 MG capsule Take 300 mg by mouth 3 (Three) Times a Day.      • isosorbide mononitrate (IMDUR) 120 MG 24 hr tablet Take 120 mg by mouth Daily.      • lisinopril (PRINIVIL,ZESTRIL) 20 MG tablet Take 20 mg by mouth 2 (Two) Times a Day.      • nicotine (NICODERM CQ) 21 MG/24HR patch Place 1 patch on the skin as directed by provider Daily.      • albuterol (PROVENTIL HFA;VENTOLIN HFA) 108 (90 BASE) MCG/ACT inhaler Inhale 2 puffs Every 4 (Four) Hours As Needed for wheezing.   Unknown at Unknown time   • carvedilol (COREG) 25 MG tablet Take 1 tablet by mouth 2 (Two) Times a Day With Meals. 60 tablet 3    • cloNIDine (CATAPRES) 0.3 MG tablet Take 1 tablet by mouth Every 8 (Eight) Hours. 90 tablet 3    • DULoxetine (CYMBALTA) 60 MG capsule Take 60 mg by mouth daily.      • hydrALAZINE (APRESOLINE) 50 MG tablet Take 1.5 tablets by mouth 3 (Three) Times a Day. 120 tablet 3    • insulin aspart (novoLOG) 100 UNIT/ML injection Inject 10 Units under the skin into the appropriate area as directed 3 (Three) Times a Day Before Meals.      • Insulin Glargine (BASAGLAR KWIKPEN) 100 UNIT/ML injection pen Inject 40 Units under the skin into the appropriate area as directed Every Night.      • lactulose (CHRONULAC) 10 GM/15ML solution Take 20 g by mouth 3 (Three) Times a Day.      • levothyroxine (SYNTHROID, LEVOTHROID) 50 MCG tablet Take 1 tablet by mouth Daily. 30 tablet 3    • NIFEdipine XL (PROCARDIA XL) 60 MG 24 hr tablet Take 60 mg by mouth 2 (two) times a day.      • rOPINIRole (REQUIP) 4 MG tablet Take 4 mg by mouth Every Night.      • simvastatin (ZOCOR) 20 MG tablet Take 40 mg by mouth Daily.          Medicines:  Current Facility-Administered Medications   Medication Dose Route Frequency Provider Last Rate Last Admin   • carvedilol (COREG) tablet 25 mg  25 mg Oral BID With Meals Adán Merrill DO   25 mg at 10/09/21 1802   • cloNIDine (CATAPRES) tablet  0.3 mg  0.3 mg Oral Q8H Adán Merrill DO   0.3 mg at 10/09/21 1300   • dextrose (D50W) 25 g/ 50mL Intravenous Solution 12.5 g  12.5 g Intravenous PRN Ken Moon MD   12.5 g at 10/05/21 2314   • dextrose (D50W) 25 g/ 50mL Intravenous Solution 25 g  25 g Intravenous Q15 Min PRN Ken Moon MD       • dextrose (GLUTOSE) oral gel 15 g  15 g Oral Q15 Min PRN Ken Moon MD       • enoxaparin (LOVENOX) syringe 30 mg  30 mg Subcutaneous Nightly Ken Moon MD   30 mg at 10/08/21 2041   • famotidine (PEPCID) tablet 20 mg  20 mg Oral Daily Adán Merrill DO   20 mg at 10/09/21 1147   • glucagon (human recombinant) (GLUCAGEN DIAGNOSTIC) injection 1 mg  1 mg Subcutaneous Q15 Min PRN Ken Moon MD       • hydrALAZINE (APRESOLINE) tablet 75 mg  75 mg Oral Q8H Adán Merrill DO       • insulin detemir (LEVEMIR) injection 10 Units  10 Units Subcutaneous Nightly Adán Merrill DO   10 Units at 10/08/21 2110   • insulin lispro (humaLOG) injection 0-9 Units  0-9 Units Subcutaneous TID AC Adán Merrill DO   7 Units at 10/09/21 1815   • ipratropium-albuterol (DUO-NEB) nebulizer solution 3 mL  3 mL Nebulization Q6H PRN Ken Moon MD       • isosorbide mononitrate (IMDUR) 24 hr tablet 60 mg  60 mg Oral Q24H Adán Merrill DO   60 mg at 10/09/21 1539   • labetalol (NORMODYNE,TRANDATE) injection 20 mg  20 mg Intravenous Q6H PRN Adán Merrill DO   20 mg at 10/09/21 1404   • levothyroxine (SYNTHROID, LEVOTHROID) tablet 50 mcg  50 mcg Oral Q AM Adán Merrill DO   50 mcg at 10/09/21 0623   • lisinopril (PRINIVIL,ZESTRIL) tablet 20 mg  20 mg Oral BID Adán Merrill DO   20 mg at 10/09/21 1041   • niCARdipine (CARDENE) 25 mg in 250 mL NS infusion  5-15 mg/hr Intravenous Titrated Adán Merrill DO 50 mL/hr at 10/09/21 1758 5 mg/hr at 10/09/21 1758   • NIFEdipine XL (PROCARDIA XL) 24 hr tablet 60 mg  60 mg Oral Q24H Adán Merirll,  DO   60 mg at 10/09/21 1147   • ondansetron (ZOFRAN) injection 4 mg  4 mg Intravenous Q6H PRN Ken Moon MD       • sodium chloride 0.9 % flush 10 mL  10 mL Intravenous PRN Ken Moon MD       • sodium chloride 0.9 % flush 10 mL  10 mL Intravenous Once PRN Ken Moon MD       • sodium chloride 0.9 % flush 10 mL  10 mL Intravenous Q12H Ken Moon MD   10 mL at 10/08/21 2041   • sodium chloride 0.9 % flush 10 mL  10 mL Intravenous PRN Ken Moon MD       • sodium chloride 0.9 % flush 3 mL  3 mL Intravenous Q12H Ken Moon MD   3 mL at 10/08/21 2041   • sodium chloride 0.9 % infusion  10 mL/hr Intravenous Continuous PRN Ken Moon MD       • spironolactone (ALDACTONE) tablet 25 mg  25 mg Oral Daily Adán Merrill, DO   25 mg at 10/09/21 1758       Past Medical History:  Past Medical History:   Diagnosis Date   • Anxiety    • Arthritis    • Chronic kidney disease     STAGE V RENAL FAILURE   • Depression    • Diabetes mellitus (HCC)    • HTN (hypertension)    • Hypothyroidism    • Obesity    • Tremors of nervous system        Past Surgical History:  Past Surgical History:   Procedure Laterality Date   • ARTERIOVENOUS FISTULA     • BREAST BIOPSY     • CARPAL TUNNEL RELEASE     • CATH LAB PROCEDURE     • CHOLECYSTECTOMY     • TUBAL ABDOMINAL LIGATION         Family History  Family History   Problem Relation Age of Onset   • Cancer Other    • Hypertension Other    • Kidney disease Other    • Heart disease Other    • Diabetes Other    • Diabetes Mother    • Diabetes Maternal Aunt    • Diabetes Maternal Grandmother        Social History  Social History     Socioeconomic History   • Marital status:    Tobacco Use   • Smoking status: Current Every Day Smoker     Packs/day: 0.50   • Smokeless tobacco: Never Used   • Tobacco comment: CUTTING BACK AND TRYING TO STOP SMOKING   Substance and Sexual Activity   • Alcohol use: No    • Drug use: No   • Sexual activity: Defer         Review of Systems:  History obtained from chart review and the patient  General ROS: No fever or chills  Respiratory ROS: No cough, shortness of breath, wheezing  Cardiovascular ROS: No chest pain or palpitations  Gastrointestinal ROS: No abdominal pain or melena  Genito-Urinary ROS: No dysuria or hematuria  Psych ROS: No anxiety and depression    Objective:  Patient Vitals for the past 24 hrs:   BP Temp Temp src Pulse Resp SpO2 Weight   10/09/21 1815 145/69 -- -- 64 20 99 % --   10/09/21 1800 176/80 -- -- 66 20 100 % --   10/09/21 1730 (!) 211/93 -- -- 66 20 -- --   10/09/21 1600 (!) 229/83 98.4 °F (36.9 °C) Axillary 66 20 -- --   10/09/21 1500 (!) 212/72 -- -- 67 20 -- --   10/09/21 1430 (!) 203/76 -- -- 66 20 -- --   10/09/21 1400 (!) 198/70 -- -- 65 -- -- --   10/09/21 1300 (!) 194/72 -- -- 63 20 -- --   10/09/21 1100 (!) 185/73 -- -- 62 20 100 % --   10/09/21 1000 180/74 -- -- 62 18 100 % --   10/09/21 0900 144/67 -- -- 61 20 100 % --   10/09/21 0800 164/73 98.1 °F (36.7 °C) Axillary 62 20 100 % --   10/09/21 0700 164/71 -- -- 61 17 100 % --   10/09/21 0600 154/74 -- -- 62 15 100 % --   10/09/21 0500 139/67 -- -- 61 10 100 % 76.5 kg (168 lb 10.4 oz)   10/09/21 0400 113/57 98.2 °F (36.8 °C) Axillary 60 14 (!) 89 % --   10/09/21 0300 123/67 -- -- 62 13 92 % --   10/09/21 0200 127/74 -- -- 63 13 95 % --   10/09/21 0100 146/76 -- -- 66 16 96 % --   10/09/21 0000 124/58 98.2 °F (36.8 °C) Axillary 63 14 93 % --   10/08/21 2300 126/71 -- -- 67 16 95 % --   10/08/21 2200 129/63 -- -- 69 12 91 % --   10/08/21 2100 140/75 -- -- 64 14 100 % --   10/08/21 2000 129/73 97.3 °F (36.3 °C) Axillary 63 16 100 % --       Intake/Output Summary (Last 24 hours) at 10/9/2021 1918  Last data filed at 10/9/2021 1800  Gross per 24 hour   Intake 1040 ml   Output 3500 ml   Net -2460 ml     General: awake/alert   Chest:  clear to auscultation bilaterally without respiratory distress  CVS:  regular rate and rhythm  Abdominal: soft, nontender, positive bowel sounds  Extremities: no cyanosis or edema  Skin: warm and dry without rash      Labs:  Results from last 7 days   Lab Units 10/09/21  0317 10/08/21  0221 10/07/21  0327   WBC 10*3/mm3 3.65 4.02 6.06   HEMOGLOBIN g/dL 8.5* 9.1* 9.5*   HEMATOCRIT % 28.0* 28.7* 29.2*   PLATELETS 10*3/mm3 103* 91* 128*         Results from last 7 days   Lab Units 10/09/21  0317 10/08/21  1125 10/07/21  0327 10/05/21  0224 10/04/21  2344   SODIUM mmol/L 129* 133* 127*   < > 104*   POTASSIUM mmol/L 3.8 3.8 3.5   < > 4.8   CHLORIDE mmol/L 94* 96* 95*   < > 67*   CO2 mmol/L 24.0 24.0 22.0   < > 17.0*   BUN mg/dL 23* 18 22*   < > 45*   CREATININE mg/dL 4.62* 3.52* 3.79*   < > 4.87*   CALCIUM mg/dL 8.3* 8.4* 8.3*   < > 8.4*   BILIRUBIN mg/dL  --  0.4  --   --  0.6   ALK PHOS U/L  --  99  --   --  167*   ALT (SGPT) U/L  --  6  --   --  7   AST (SGOT) U/L  --  17  --   --  15   GLUCOSE mg/dL 182* 273* 81   < > >1,500*    < > = values in this interval not displayed.       Radiology:   Imaging Results (Last 72 Hours)     Procedure Component Value Units Date/Time    CT Abdomen Pelvis With Contrast [[id number]] Collected: 10/07/21 0757     Updated: 10/07/21 0813    Narrative:      EXAM: CT ABDOMEN PELVIS W CONTRAST- - 10/7/2021 5:49 AM CDT     HISTORY: abnormal hcg concern for hormone secreting tumor, please pt  special attention to adrenals and ovaries (not seen on pelvic us);  E11.00-Type 2 diabetes mellitus with hyperosmolarity without nonketotic  hyperglycemic-hyperosmolar coma ([facility]); E11.65-Type 2 diabetes mellitus  with hyperglycemia; R41.82-Altered mental status, unspecified;  J18.9-Pneumonia, unspecified organism; R13.10-Dysphagia, unspeci       COMPARISON: No existing relevant imaging studies available.      DOSE LENGTH PRODUCT: 2412 mGy cm. Automatic exposure control was  utilized to make radiation dose as low as reasonably achievable.     TECHNIQUE: Enhanced  axial  images of the abdomen and pelvis obtained  with multiplanar reformats.     FINDINGS:  VISUALIZED CHEST: Bilateral pleural effusions. Bibasilar consolidative  opacities, which appear to be enhancing suggestive of atelectasis. No  pericardial effusion. Prominent inferior heart size.     LIVER: No focal liver lesion. Patent portal and hepatic veins.      BILIARY: Cholecystectomy clips. No bile duct dilation.     PANCREAS: Normal pancreas contour and enhancement.     SPLEEN: Normal size and contour.      ADRENAL: Normal appearance of the bilateral adrenal glands.     GENITOURINARY:   Atrophic kidneys. Left lateral renal cortex with a hypodense lesion  which appears to be nonenhancing on axial series 3, image 47. There is  also a left parapelvic simple appearing cyst. Bilateral renovascular  calcifications. No hydronephrosis.   Page catheter in a decompressed urinary bladder. Underdistention limits  evaluation.  There may be a small uterus or residual cervix. Adnexa are not  discretely identified.     PERITONEUM: Increased density of the mesentery and omentum, favored to  represent edema. No convincing nodularity. At least a small amount of  leona perihepatic ascites. No free air.     GI TRACT: Normal configuration of the stomach and duodenum.  Diffusely  abnormal appearance of the bowel. In the appropriate clinical setting,  this appearance could be supportive of shock bowel. No evidence of bowel  obstruction. Appendix not discretely identified.     VESSELS: Aorta normal in course and caliber with calcified  atherosclerosis. Main branch vessels appear grossly patent on this  non-CTA exam.     RETROPERITONEUM: No convincing adenopathy. Limited evaluation of the  retroperitoneum due to technique.     SOFT TISSUES: Anasarca.     BONES: No acute or aggressive bony lesion.           Impression:      1. Normal adrenal glands. Ovaries not discretely identified.  2. Diffusely abnormal appearance of the bowel, which in the  appropriate  clinical setting could be supportive of shock bowel.  3. Findings of fluid overload including bilateral pleural effusions,  ascites and diffusely increased density of the mesentery and omentum,  anasarca.     This report was finalized on 10/07/2021 08:10 by Dr Brenda Coker MD.          Culture:  Blood Culture   Date Value Ref Range Status   10/05/2021 No growth at 4 days  Preliminary   10/04/2021 No growth at 4 days  Preliminary     Urine Culture   Date Value Ref Range Status   10/05/2021 <10,000 CFU/mL Mixed Polly Isolated  Final         Assessment   End-stage renal disease  Diabetes type 2 with diabetic nephropathy  Hypertension  Hyperosmolar hyperglycemic on admission  Hyponatremia due to volume overload/profound hyperglycemia  Metabolic acidosis  Acute metabolic encephalopathy  Anemia in chronic kidney disease  Secondary hyperparathyroidism  Hypothyroidism     Plan:  Hemodialysis Tuesday Thursday Saturday per routine scheduling  Monitor labs  Ultrafiltration as tolerated with dialysis  Continue to adjust bp meds as needed, spironolactone added among others and Cardene drip planning to be restarted  Started thyroid meds      Surjit Avitia MD  10/9/2021  19:18 CDT

## 2021-10-10 NOTE — PLAN OF CARE
Goal Outcome Evaluation:           Progress: improving  Outcome Summary: Pt. A&Ox4. Still no UOP or BM. Cardene off since 2030, BPs 120-130s systolic. VSS. Continue to monitor.

## 2021-10-11 NOTE — PROGRESS NOTES
[facility]  INPATIENT PROGRESS NOTE    Patient Name: Ena Upton  Date of Admission: 10/4/2021  Today's Date: 10/11/21  Length of Stay: 6  Primary Care Physician: Yaron Wiggins APRN    Subjective   Chief Complaint: f/u   HPI   Toothache - requesting Tylenol  Doing better since admission  Walked about 60' per PT  Noted last note of SW last week  Moved out of the unit yesterday.    Admitted for AMS- managed for HHS  Carries hx of ESRD on dialysis qTThS through av fistula left arm  She is diabetic, hypertensive. Last BP is in the 170's. She is on 6 medications that can address her bp.  I spoke to nurse  Minal about meds      Review of Systems     All pertinent negatives and positives are as above. All other systems have been reviewed and are negative unless otherwise stated.     Objective    Temp:  [98 °F (36.7 °C)-98.9 °F (37.2 °C)] 98.4 °F (36.9 °C)  Heart Rate:  [60-94] 64  Resp:  [16-18] 16  BP: (111-190)/(54-72) 190/72  Physical Exam   Seated comfortable at edge of bed  No distress  interactive  GEN: Awake, alert, interactive, in NAD  HEENT: Atraumatic, PERRLA, EOMI, Anicteric, Trachea midline  Lungs: CTAB, no wheezing/rales/rhonchi  Heart: RRR, +S1/s2, no rub  ABD: soft, nt/nd, +BS, no guarding/rebound  Extremities: atraumatic, no cyanosis, no edema  Skin: no rashes or lesions; av fistula left arm  Neuro: AAOx3, no focal deficits  Psych: normal mood & affect        Results Review:  I have reviewed the labs, radiology results, and diagnostic studies.    Laboratory Data:   Results from last 7 days   Lab Units 10/10/21  0249 10/09/21  0317 10/08/21  0221   WBC 10*3/mm3 3.95 3.65 4.02   HEMOGLOBIN g/dL 8.6* 8.5* 9.1*   HEMATOCRIT % 28.3* 28.0* 28.7*   PLATELETS 10*3/mm3 120* 103* 91*        Results from last 7 days   Lab Units 10/10/21  0249 10/09/21  0317 10/08/21  1125 10/05/21  0224 10/04/21  2344   SODIUM mmol/L 133* 129* 133*   < > 104*  "  POTASSIUM mmol/L 4.1 3.8 3.8   < > 4.8   CHLORIDE mmol/L 96* 94* 96*   < > 67*   CO2 mmol/L 27.0 24.0 24.0   < > 17.0*   BUN mg/dL 17 23* 18   < > 45*   CREATININE mg/dL 3.48* 4.62* 3.52*   < > 4.87*   CALCIUM mg/dL 8.3* 8.3* 8.4*   < > 8.4*   BILIRUBIN mg/dL  --   --  0.4  --  0.6   ALK PHOS U/L  --   --  99  --  167*   ALT (SGPT) U/L  --   --  6  --  7   AST (SGOT) U/L  --   --  17  --  15   GLUCOSE mg/dL 190* 182* 273*   < > >1,500*    < > = values in this interval not displayed.       Culture Data:   Blood Culture   Date Value Ref Range Status   10/05/2021 No growth at 5 days  Final   10/04/2021 No growth at 5 days  Final     Urine Culture   Date Value Ref Range Status   10/05/2021 <10,000 CFU/mL Mixed Polly Isolated  Final       Radiology Data:   Imaging Results (Last 24 Hours)     ** No results found for the last 24 hours. **          I have reviewed the patient's current medications.     Assessment/Plan     Active Hospital Problems    Diagnosis    • **Hyperosmolar hyperglycemic state (HHS) (HCC)    • Hyponatremia    • Uremic encephalopathy syndrome    • Hypertensive emergency    • Hypothyroidism    • End stage kidney disease (HCC)        HHS - resolved  Encephalopathy (metabolic)- resolved  ESRD on dialysis TThS  + HCG - work up as per documented by Dr. Merrill yesterday in PN. AS per note overall findings not consistent with pregnancy or tumor.  \"I am not pregnant. I had ablation of my uterus in 2012. No period since then.\"  Hyponatremia - renal disease  Thrombocytopenia - improving. No issues on bleeding.     Awaiting info from  on snf  Cont pt/ot  Acetaminophen for toothache.  antihtn meds. Monitor bp    carvedilol, 25 mg, Oral, BID With Meals  cloNIDine, 0.3 mg, Oral, Q8H  enoxaparin, 30 mg, Subcutaneous, Nightly  famotidine, 20 mg, Oral, Daily  hydrALAZINE, 75 mg, Oral, Q8H  insulin detemir, 10 Units, Subcutaneous, Nightly  insulin lispro, 0-9 Units, Subcutaneous, TID AC  isosorbide mononitrate, 60 mg, " Oral, Q24H  levothyroxine, 50 mcg, Oral, Q AM  lisinopril, 20 mg, Oral, BID  NIFEdipine XL, 60 mg, Oral, Q24H  sodium chloride, 10 mL, Intravenous, Q12H  sodium chloride, 3 mL, Intravenous, Q12H  spironolactone, 25 mg, Oral, Daily          Discharge Planning:tbd  Electronically signed by Harrison Moya MD, 10/11/21, 12:26 CDT.

## 2021-10-11 NOTE — PLAN OF CARE
Goal Outcome Evaluation:            Pt independently ambulating in the room. Pt has had 1 dose of Tylenol for headache. Blood pressure has been elevated today. Scheduled PO Clonidine and Hydralazine given. Pt needs reinforced education on diet regimen to control blood glucose levels. Blood glucose has been in the 200's requiring insulin per S/S coverage. Pt planning to go to a SNF upon discharge.

## 2021-10-11 NOTE — PLAN OF CARE
"  Problem: Palliative Care  Goal: Enhanced Quality of Life  Outcome: Ongoing, Not Progressing  Intervention: Optimize Psychosocial Wellbeing  Flowsheets (Taken 10/11/2021 1108)  Supportive Measures: active listening utilized      Patient was resting in bed. She opened her eyes and was willing to have a conversation. She is alert and oriented x4.  She had worked with therapy this morning and was \"so happy\" she walked.She is currently having pain in her right jaw due to a broken tooth. She stated, \"I just wish I had an aspirin or something because it hurts so bad\". Told her I would speak with her nurse. She discussed at length the night before she was admitted with this hospitalization. She verbalized she had \"no idea\" that she would not \"wake up\" at home.  She is comfortable with the care she is receiving and is currently full interventions/No CPR. She was appreciative of conversation.     Spoke with patient's nurse regarding tooth pain.     Will continue to follow until discharge.    AYANNA Velazquez  10/11/2021      "

## 2021-10-11 NOTE — PLAN OF CARE
Goal Outcome Evaluation:  Plan of Care Reviewed With: patient  Progress: improving      Pt with minimal c/o pain this shift. IV in place IID. No urine thus far this shift; dialysis pt. Fistula L arm.  this shift; Levemir given per order. BP maintained stable this shift. A/O. VSS. Safety maintained.

## 2021-10-11 NOTE — THERAPY TREATMENT NOTE
Acute Care - Physical Therapy Treatment Note  [facility]     Patient Name: Ena Upton  : 1970  MRN: [id number]  Today's Date: 10/11/2021      Visit Dx:     ICD-10-CM ICD-9-CM   1. Hyperosmolar hyperglycemic state (HHS) (HCC)  E11.00 250.22    E11.65    2. Altered mental status, unspecified altered mental status type  R41.82 780.97   3. Pneumonia due to infectious organism, unspecified laterality, unspecified part of lung  J18.9 486   4. Dysphagia, unspecified type  R13.10 787.20   5. Cognitive changes  R41.89 799.59   6. Decreased activities of daily living (ADL)  Z78.9 V49.89   7. 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)   • Non-ketotic hyperosmolar coma (HCC)   • HTN (hypertension)   • Intractable nausea and vomiting   • Constipation   • Gastroparesis due to DM (HCC)   • Hyperglycemia   • Seizure (HCC)   • End stage kidney disease (HCC)   • Sacral insufficiency fracture   • Hypertensive urgency   • Uncontrolled diabetes mellitus (HCC)   • Hyperkalemia   • Altered mental status   • Troponin level elevated   • Hypertensive emergency   • Acute pulmonary edema (HCC)   • Hypothyroidism   • Hyperosmolar hyperglycemic state (HHS) (HCC)   • Uremic encephalopathy syndrome   • Hyponatremia     Past Medical History:   Diagnosis Date   • Anxiety    • Arthritis    • Chronic kidney disease     STAGE V RENAL FAILURE   • Depression    • Diabetes mellitus (HCC)    • HTN (hypertension)    • Hypothyroidism    • Obesity    • Tremors of nervous system      Past Surgical History:   Procedure Laterality Date   • ARTERIOVENOUS FISTULA     • BREAST BIOPSY     • CARPAL TUNNEL RELEASE     • CATH LAB PROCEDURE     • CHOLECYSTECTOMY     • TUBAL ABDOMINAL LIGATION       PT Assessment (last 12 hours)     PT Evaluation and Treatment     Row Name 10/11/21 0900          Physical Therapy Time and Intention    Subjective Information complains of;  weakness  -TB     Document Type therapy note (daily note)  -TB     Mode of Treatment physical therapy  -TB     Patient Effort good  -TB     Row Name 10/11/21 0900          General Information    Existing Precautions/Restrictions fall  -TB     Row Name 10/11/21 0900          Pain Scale: Numbers Pre/Post-Treatment    Pretreatment Pain Rating 0/10 - no pain  -TB     Posttreatment Pain Rating 0/10 - no pain  -TB     Row Name 10/11/21 0900          Bed Mobility    Bed Mobility sit-supine; scooting/bridging  -TB     Scooting/Bridging [place] (Bed Mobility) independent  -TB     Sit-Supine [place] (Bed Mobility) independent  -TB     Comment (Bed Mobility) Bed flat  -TB     Row Name 10/11/21 0900          Transfers    Transfers sit-stand transfer; stand-sit transfer  -TB     Sit-Stand [place] (Transfers) standby assist  -TB     Stand-Sit [place] (Transfers) standby assist  -TB     Row Name 10/11/21 0900          Sit-Stand Transfer    Assistive Device (Sit-Stand Transfers) walker, front-wheeled  -TB     Row Name 10/11/21 0900          Stand-Sit Transfer    Assistive Device (Stand-Sit Transfers) walker, front-wheeled  -TB     Row Name 10/11/21 0900          Gait/Stairs (Locomotion)    [place] Level (Gait) contact guard; verbal cues  -TB     Assistive Device (Gait) walker, front-wheeled  -TB     Distance in Feet (Gait) 60  -TB     Deviations/Abnormal Patterns (Gait) gait speed decreased; stride length decreased  -TB     Bilateral Gait Deviations forward flexed posture  -TB     Comment (Gait/Stairs) declined stairs or further amb due to weakness  -TB     Row Name 10/11/21 0900          Motor Skills    Therapeutic Exercise --  AROM BLE x15 in sitting  -TB     Row Name 10/11/21 0900          Plan of Care Review    Plan of Care Reviewed With patient  -TB     Progress no change  -TB     Outcome Summary Pt agreeabel to therapy w/ c/o weakness all over. Performed AROM BLE x15 in sitting. Stood w/ SBA and amb  60' w/ RW and CGA. Pt declined further ambualtion or practicing the stairs due to weaknes. Independent to get into bed. Will cont POC.  -TB     Row Name 10/11/21 0900          Positioning and Restraints    Pre-Treatment Position sitting in chair/recliner  -TB     Post Treatment Position bed  -TB     In Bed supine; call light within reach; encouraged to call for assist; exit alarm on; side rails up x2  -TB           User Key  (r) = Recorded By, (t) = Taken By, (c) = Cosigned By    Initials Name Provider Type    TB Christiano Angulo, PTA Physical Therapy Assistant              Physical Therapy Education                 Title: PT OT SLP Therapies (In Progress)     Topic: Physical Therapy (In Progress)     Point: Mobility training (Done)     Learning Progress Summary           Patient Acceptance, E,TB,HANNA, BULMARO,SELWYN,NR by  at 10/8/2021 1623    Comment: Education re: purpose of PT/importance of activity, for safety/falls prevention, for use of rwx to improve stability to reduce fall risk. for improved tech w/ bed mob, tfers and gait w/ use of rwx and for positioning/skin protection/pressure relief                   Point: Home exercise program (Not Started)     Learner Progress:  Not documented in this visit.          Point: Precautions (Done)     Learning Progress Summary           Patient Acceptance, E,TB,HANNA, BULMARO,SELWYN,NR by SUKUMAR at 10/8/2021 1623    Comment: Education re: purpose of PT/importance of activity, for safety/falls prevention, for use of rwx to improve stability to reduce fall risk. for improved tech w/ bed mob, tfers and gait w/ use of rwx and for positioning/skin protection/pressure relief                               User Key     Initials Effective Dates Name Provider Type Discipline     08/02/18 -  Amairani Nieves, PT Physical Therapist PT              PT Recommendation and Plan     Plan of Care Reviewed With: patient  Progress: no change  Outcome Summary: Pt agreeabel to therapy w/ c/o weakness all over.  Performed AROM BLE x15 in sitting. Stood w/ SBA and amb 60' w/ RW and CGA. Pt declined further ambualtion or practicing the stairs due to weaknes. Independent to get into bed. Will cont POC.       Time Calculation:    PT Charges     Row Name 10/11/21 1013             Time Calculation    Start Time 0900  -TB      Stop Time 0923  -TB      Time Calculation (min) 23 min  -TB      PT Received On 10/11/21  -TB              Time Calculation- PT    Total Timed Code Minutes- PT 23 minute(s)  -TB            User Key  (r) = Recorded By, (t) = Taken By, (c) = Cosigned By    Initials Name Provider Type    TB Christiano Angulo, PTA Physical Therapy Assistant              Therapy Charges for Today     Code Description Service Date Service Provider Modifiers Qty    92468455212 HC GAIT TRAINING EA 15 MIN 10/11/2021 Christiano Angulo PTA GP 1    50360157446 HC PT THER PROC EA 15 MIN 10/11/2021 Christiano Angulo PTA GP 1          PT G-Codes  Outcome Measure Options: AM-PAC 6 Clicks Basic Mobility (PT)  AM-PAC 6 Clicks Score (PT): 18  AM-PAC 6 Clicks Score (OT): 21    Christiano Angulo PTA  10/11/2021

## 2021-10-11 NOTE — CASE MANAGEMENT/SOCIAL WORK
Continued Stay Note   [facility]     Patient Name: Ena Upton  MRN: [id number]  Today's Date: 10/11/2021    Admit Date: 10/4/2021     Discharge Plan     Row Name 10/11/21 1232       Plan    Plan Comments CYNDY spoke with Oksana from [facility] and Daysi from [facility] who both offer a bed on pt. CYNDY spoke with pt to see who she chooses to accept and she states that she would accept [facility] bed offer. CYNDY spoke with Oksana from [facility] who states that she had gotten off the phone with her and that pt chose [facility]. CYNDY then again spoke with pt and gave both options and [facility]ert will be started. CYNDY will follow for approval               Discharge Codes    No documentation.                     Tiarra Nelson

## 2021-10-11 NOTE — THERAPY TREATMENT NOTE
Acute Care - Occupational Therapy Treatment Note  [facility]     Patient Name: Ena Upton  : 1970  MRN: [id number]  Today's Date: 10/11/2021             Admit Date: 10/4/2021       ICD-10-CM ICD-9-CM   1. Hyperosmolar hyperglycemic state (HHS) (HCC)  E11.00 250.22    E11.65    2. Altered mental status, unspecified altered mental status type  R41.82 780.97   3. Pneumonia due to infectious organism, unspecified laterality, unspecified part of lung  J18.9 486   4. Dysphagia, unspecified type  R13.10 787.20   5. Cognitive changes  R41.89 799.59   6. Decreased activities of daily living (ADL)  Z78.9 V49.89   7. 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)   • Non-ketotic hyperosmolar coma (HCC)   • HTN (hypertension)   • Intractable nausea and vomiting   • Constipation   • Gastroparesis due to DM (HCC)   • Hyperglycemia   • Seizure (HCC)   • End stage kidney disease (HCC)   • Sacral insufficiency fracture   • Hypertensive urgency   • Uncontrolled diabetes mellitus (HCC)   • Hyperkalemia   • Altered mental status   • Troponin level elevated   • Hypertensive emergency   • Acute pulmonary edema (HCC)   • Hypothyroidism   • Hyperosmolar hyperglycemic state (HHS) (HCC)   • Uremic encephalopathy syndrome   • Hyponatremia     Past Medical History:   Diagnosis Date   • Anxiety    • Arthritis    • Chronic kidney disease     STAGE V RENAL FAILURE   • Depression    • Diabetes mellitus (HCC)    • HTN (hypertension)    • Hypothyroidism    • Obesity    • Tremors of nervous system      Past Surgical History:   Procedure Laterality Date   • ARTERIOVENOUS FISTULA     • BREAST BIOPSY     • CARPAL TUNNEL RELEASE     • CATH LAB PROCEDURE     • CHOLECYSTECTOMY     • TUBAL ABDOMINAL LIGATION           OT ASSESSMENT FLOWSHEET (last 12 hours)     OT Evaluation and Treatment     Row Name 10/11/21 1331                   OT Time and Intention     Subjective Information pain; weakness  -LS        Document Type therapy note (daily note)  -LS        Mode of Treatment occupational therapy  -LS        Patient Effort good  -LS                  General Information    Patient Profile Reviewed yes  -LS        Existing Precautions/Restrictions fall  -LS        Barriers to Rehab medically complex  -LS                  Cognition    Orientation Status (Cognition) oriented x 4  -LS                  Pain Assessment    Additional Documentation Pain Scale: Numbers Pre/Post-Treatment (Group)  -LS                  Pain Scale: Numbers Pre/Post-Treatment    Pretreatment Pain Rating 8/10  -LS        Posttreatment Pain Rating 8/10  -LS        Pain Location tooth  -LS                  Bed Mobility    Bed Mobility supine-sit  -LS        Scooting/Bridging [place] (Bed Mobility) independent  -LS                  Functional Mobility    Functional Mobility- Ind. Level contact guard assist  -LS        Functional Mobility- Device rolling walker  -LS                  Transfer Assessment/Treatment    Transfers sit-stand transfer; stand-sit transfer  -LS                  Transfers    Sit-Stand [place] (Transfers) standby assist  -LS        Stand-Sit [place] (Transfers) standby assist  -LS                  Sit-Stand Transfer    Assistive Device (Sit-Stand Transfers) walker, front-wheeled  -                  Stand-Sit Transfer    Assistive Device (Stand-Sit Transfers) walker, front-wheeled  -LS                  Balance    Balance Assessment sitting static balance; sitting dynamic balance; sit to stand dynamic balance; standing static balance; standing dynamic balance  -LS        Static Sitting Balance WFL; supported  -LS        Dynamic Sitting Balance WFL; supported  -LS        Sit to Stand Dynamic Balance mild impairment  -LS        Static Standing Balance mild impairment  -LS        Dynamic Standing Balance mild impairment  -LS                  Activities of Daily Living     BADL Assessment/Intervention bathing; lower body dressing; toileting  -LS                  Bathing Assessment/Intervention    [place] Level (Bathing) lower body; upper body; standby assist  -LS        Assistive Devices (Bathing) shower chair; grab bar, tub/shower  -LS        Position (Bathing) supported sitting  -LS                  Lower Body Dressing Assessment/Training    [place] Level (Lower Body Dressing) doff; don; pants/bottoms; socks  -LS        Position (Lower Body Dressing) supported sitting  -LS                  Toileting Assessment/Training    [place] Level (Toileting) toileting skills; adjust/manage clothing; minimum assist (75% patient effort)  -LS        Assistive Devices (Toileting) commode; grab bar/safety frame  -LS        Position (Toileting) supported sitting  -LS                  Coping    Observed Emotional State calm; cooperative  -LS        Verbalized Emotional State acceptance  -LS                  Plan of Care Review    Plan of Care Reviewed With patient  -LS        Progress improving  -LS        Outcome Summary OT tx completed on this date. Pt alert and oriented x4. Pt reports that she has constant 8/10 pain in her tooth and needs to go to the dentist and was given a Tylenol earlier, no other pain reported. Pt completed bed mobility with grab bar independently, pt completed sit to stands with Min A and RW for UE support, pt completed functional mobility in her room with RW requiring CGA, pt completed toileting tasks requiring min A for sit to stand with grab bar and SBA for all other aspects, pt completed showering with shower chair and min A for washing hair, CGA for standing aspects, set up for all other aspects, pt completed LB dressing tasks of donning socks with set up and min A for donning brief. Pt reports that she is going to [employer] tomorrow. O2 sats remained in the upper 90s during all activities and pt demonstrated good activity tolerance. Continue OT POC  until discharge.  -LS                  Vital Signs    Pre SpO2 (%) 99  -LS        O2 Delivery Pre Treatment room air  -LS        Post SpO2 (%) 100  -LS        O2 Delivery Post Treatment room air  -LS        Pre Patient Position Supine  -LS        Intra Patient Position Standing  -LS        Post Patient Position Sitting  -LS                  Positioning and Restraints    Pre-Treatment Position in bed  -LS        Post Treatment Position chair  -LS        In Chair sitting; call light within reach; encouraged to call for assist  -LS              User Key  (r) = Recorded By, (t) = Taken By, (c) = Cosigned By    Initials Name Effective Dates    LS Brenna John COTA 09/15/21 -                  Occupational Therapy Education                 Title: PT OT SLP Therapies (In Progress)     Topic: Occupational Therapy (In Progress)     Point: ADL training (Done)     Description:   Instruct learner(s) on proper safety adaptation and remediation techniques during self care or transfers.   Instruct in proper use of assistive devices.              Learning Progress Summary           Patient Acceptance, E, VU by SAM at 10/8/2021 0812                   Point: Home exercise program (Not Started)     Description:   Instruct learner(s) on appropriate technique for monitoring, assisting and/or progressing therapeutic exercises/activities.              Learner Progress:  Not documented in this visit.          Point: Precautions (Not Started)     Description:   Instruct learner(s) on prescribed precautions during self-care and functional transfers.              Learner Progress:  Not documented in this visit.          Point: Body mechanics (Not Started)     Description:   Instruct learner(s) on proper positioning and spine alignment during self-care, functional mobility activities and/or exercises.              Learner Progress:  Not documented in this visit.                      User Key     Initials Effective Dates Name Provider Type  Discipline    SAM 06/16/21 -  Desire Keita OTR/L Occupational Therapist OT                  OT Recommendation and Plan     Plan of Care Review  Plan of Care Reviewed With: patient  Progress: improving  Outcome Summary: OT tx completed on this date. Pt alert and oriented x4. Pt reports that she has constant 8/10 pain in her tooth and needs to go to the dentist and was given a Tylenol earlier, no other pain reported. Pt completed bed mobility with grab bar independently, pt completed sit to stands with Min A and RW for UE support, pt completed functional mobility in her room with RW requiring CGA, pt completed toileting tasks requiring min A for sit to stand with grab bar and SBA for all other aspects, pt completed showering with shower chair and min A for washing hair, CGA for standing aspects, set up for all other aspects, pt completed LB dressing tasks of donning socks with set up and min A for donning brief. Pt reports that she is going to [place] tomorrow. O2 sats remained in the upper 90s during all activities and pt demonstrated good activity tolerance. Continue OT POC until discharge.  Plan of Care Reviewed With: patient  Outcome Summary: OT tx completed on this date. Pt alert and oriented x4. Pt reports that she has constant 8/10 pain in her tooth and needs to go to the dentist and was given a Tylenol earlier, no other pain reported. Pt completed bed mobility with grab bar independently, pt completed sit to stands with Min A and RW for UE support, pt completed functional mobility in her room with RW requiring CGA, pt completed toileting tasks requiring min A for sit to stand with grab bar and SBA for all other aspects, pt completed showering with shower chair and min A for washing hair, CGA for standing aspects, set up for all other aspects, pt completed LB dressing tasks of donning socks with set up and min A for donning brief. Pt reports that she is going to [place] tomorrow. O2 sats remained  in the upper 90s during all activities and pt demonstrated good activity tolerance. Continue OT POC until discharge.        Time Calculation:    Time Calculation- OT     Row Name 10/11/21 1511             Time Calculation- OT    OT Start Time 1330  -LS      OT Stop Time 1447  -LS      OT Time Calculation (min) 77 min  -      Total Timed Code Minutes- OT 77 minute(s)  -      OT Received On 10/11/21  -            User Key  (r) = Recorded By, (t) = Taken By, (c) = Cosigned By    Initials Name Provider Type    Brenna Chen COTA Occupational Therapy Assistant              Therapy Charges for Today     Code Description Service Date Service Provider Modifiers Qty    48248956916 HC OT SELF CARE/MGMT/TRAIN EA 15 MIN 10/11/2021 Brenna John COTA GO 5               RACHEL CARRASCO  10/11/2021

## 2021-10-11 NOTE — PLAN OF CARE
Problem: Adult Inpatient Plan of Care  Goal: Plan of Care Review  Recent Flowsheet Documentation  Taken 10/11/2021 0900 by Christiano Angulo, PTA  Progress: no change  Plan of Care Reviewed With: patient  Outcome Summary: Pt agreeable to therapy w/ c/o weakness all over. Performed AROM BLE x15 in sitting. Stood w/ SBA and amb 60' w/ RW and CGA. Pt declined further ambualtion or practicing the stairs due to weaknes. Independent to get into bed. Will cont POC.

## 2021-10-11 NOTE — PLAN OF CARE
Goal Outcome Evaluation:  Plan of Care Reviewed With: patient        Progress: improving  Outcome Summary: OT tx completed on this date. Pt alert and oriented x4. Pt reports that she has constant 8/10 pain in her tooth and needs to go to the dentist and was given a Tylenol earlier, no other pain reported. Pt completed bed mobility with grab bar independently, pt completed sit to stands with Min A and RW for UE support, pt completed functional mobility in her room with RW requiring CGA, pt completed toileting tasks requiring min A for sit to stand with grab bar and SBA for all other aspects, pt completed showering with shower chair and min A for washing hair, CGA for standing aspects, set up for all other aspects, pt completed LB dressing tasks of donning socks with set up and min A for donning brief. Pt reports that she is going to [email or web address] tomorrow. O2 sats remained in the upper 90s during all activities and pt demonstrated good activity tolerance. Continue OT POC until discharge.

## 2021-10-12 NOTE — THERAPY DISCHARGE NOTE
Acute Care - Occupational Therapy Discharge  [facility]    Patient Name: Ena Upton  : 1970    MRN: [id number]                              Today's Date: 10/12/2021       Admit Date: 10/4/2021    Visit Dx:     ICD-10-CM ICD-9-CM   1. Hyperosmolar hyperglycemic state (HHS) (HCC)  E11.00 250.22    E11.65    2. Altered mental status, unspecified altered mental status type  R41.82 780.97   3. Pneumonia due to infectious organism, unspecified laterality, unspecified part of lung  J18.9 486   4. Dysphagia, unspecified type  R13.10 787.20   5. Cognitive changes  R41.89 799.59   6. Decreased activities of daily living (ADL)  Z78.9 V49.89   7. Gait abnormality  R26.9 781.2   8. 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)   • Non-ketotic hyperosmolar coma (HCC)   • HTN (hypertension)   • Intractable nausea and vomiting   • Constipation   • Gastroparesis due to DM (HCC)   • Hyperglycemia   • Seizure (HCC)   • End stage kidney disease (HCC)   • Sacral insufficiency fracture   • Hypertensive urgency   • Uncontrolled diabetes mellitus (HCC)   • Hyperkalemia   • Altered mental status   • Troponin level elevated   • Hypertensive emergency   • Acute pulmonary edema (HCC)   • Hypothyroidism   • Hyperosmolar hyperglycemic state (HHS) (HCC)   • Uremic encephalopathy syndrome   • Hyponatremia     Past Medical History:   Diagnosis Date   • Anxiety    • Arthritis    • Chronic kidney disease     STAGE V RENAL FAILURE   • Depression    • Diabetes mellitus (HCC)    • HTN (hypertension)    • Hypothyroidism    • Obesity    • Tremors of nervous system      Past Surgical History:   Procedure Laterality Date   • ARTERIOVENOUS FISTULA     • BREAST BIOPSY     • CARPAL TUNNEL RELEASE     • CATH LAB PROCEDURE     • CHOLECYSTECTOMY     • TUBAL ABDOMINAL LIGATION        General  Information     Row Name 10/12/21 1508          OT Time and Intention    Mode of Treatment occupational therapy  -MW (r) EJ (t) MW (c)     Row Name 10/12/21 1508          General Information    Patient Profile Reviewed yes  -MW (r) EJ (t) MW (c)     Existing Precautions/Restrictions fall  -MW (r) EJ (t) MW (c)     Row Name 10/12/21 1508          Cognition    Orientation Status (Cognition) oriented x 4  -MW (r) EJ (t) MW (c)     Row Name 10/12/21 1508          Safety Issues, Functional Mobility    Safety Issues Affecting Function (Mobility) friction/shear risk  -MW (r) EJ (t) MW (c)     Impairments Affecting Function (Mobility) balance; endurance/activity tolerance; strength  -MW (r) EJ (t) MW (c)           User Key  (r) = Recorded By, (t) = Taken By, (c) = Cosigned By    Initials Name Provider Type    MW Neda Guzman, OTR/L Occupational Therapist    EJ Carmita Ng, OT Student OT Student               Mobility/ADL's     Row Name 10/12/21 1508          Bed Mobility    Comment (Bed Mobility) pt sitting in chair  -MW (r) EJ (t) MW (c)     Row Name 10/12/21 1508          Transfers    Transfers sit-stand transfer  -MW (r) EJ (t) MW (c)     Sit-Stand Refugio (Transfers) verbal cues; nonverbal cues (demo/gesture); 1 person assist; contact guard  -MW (r) EJ (t) MW (c)     Row Name 10/12/21 1508          Activities of Daily Living    BADL Assessment/Intervention upper body dressing; lower body dressing  -MW (r) EJ (t) MW (c)     Row Name 10/12/21 1508          Lower Body Dressing Assessment/Training    Refugio Level (Lower Body Dressing) doff; don; pants/bottoms; shoes/slippers; socks; independent; contact guard assist  -MW (r) EJ (t) MW (c)     Position (Lower Body Dressing) supported sitting  -MW (r) EJ (t) MW (c)     Comment (Lower Body Dressing) CGA needed for sit to stand only  -MW (r) EJ (t) MW (c)     Row Name 10/12/21 1508          Upper Body Dressing Assessment/Training    Refugio Level (Upper  Body Dressing) doff; don; bra/undergarment; pull-over garment; independent  -MW (r) EJ (t) MW (c)     Position (Upper Body Dressing) supported sitting  -MW (r) EJ (t) MW (c)     Row Name 10/12/21 1508          Grooming Assessment/Training    Bonner Level (Grooming) --  -MW (r) EJ (t) MW (c)     Position (Grooming) --  -MW (r) EJ (t) MW (c)           User Key  (r) = Recorded By, (t) = Taken By, (c) = Cosigned By    Initials Name Provider Type    KALEN Neda Guzman, OTR/L Occupational Therapist    Carmita Castañeda, OT Student OT Student               Obj/Interventions     Row Name 10/12/21 1508          Balance    Balance Assessment sitting static balance; sitting dynamic balance  -MW (r) EJ (t) MW (c)     Static Sitting Balance WFL; unsupported; sitting in chair  -MW (r) EJ (t) MW (c)     Dynamic Sitting Balance WFL; unsupported; sitting in chair  -MW (r) EJ (t) MW (c)           User Key  (r) = Recorded By, (t) = Taken By, (c) = Cosigned By    Initials Name Provider Type    Neda Greene, OTR/L Occupational Therapist    Carmita Castañeda, OT Student OT Student               Goals/Plan     Row Name 10/12/21 1509          Transfer Goal 1 (OT)    Activity/Assistive Device (Transfer Goal 1, OT) tub  -MW (r) EJ (t) MW (c)     Bonner Level/Cues Needed (Transfer Goal 1, OT) modified independence  -MW (r) EJ (t) MW (c)     Time Frame (Transfer Goal 1, OT) long term goal (LTG); by discharge  -MW (r) EJ (t) MW (c)     Progress/Outcome (Transfer Goal 1, OT) goal not met; discharged from facility  -MW (r) EJ (t) MW (c)     Row Name 10/12/21 1503          Bathing Goal 1 (OT)    Activity/Device (Bathing Goal 1, OT) bathing skills, all  -MW (r) EJ (t) MW (c)     Bonner Level/Cues Needed (Bathing Goal 1, OT) modified independence  -MW (r) EJ (t) MW (c)     Time Frame (Bathing Goal 1, OT) long term goal (LTG); by discharge  -MW (r) RUTHANN (t) MW (c)     Progress/Outcomes (Bathing Goal 1, OT) goal not met;  discharged from facility  -MW (r) EJ (t) MW (c)     Row Name 10/12/21 1503          ROM Goal 1 (OT)    ROM Goal 1 (OT) Pt will participate in functional task in standing for 8 minutes with no recovery periods to increase her tolerance for activity and independence with adls.  -MW (r) EJ (t) MW (c)     Time Frame (ROM Goal 1, OT) long term goal (LTG); by discharge  -MW (r) EJ (t) MW (c)     Progress/Outcome (ROM Goal 1, OT) goal not met; discharged from facility  -MW (r) EJ (t) MW (c)           User Key  (r) = Recorded By, (t) = Taken By, (c) = Cosigned By    Initials Name Provider Type    Neda Greene, OTR/L Occupational Therapist    Carmita Castañeda, OT Student OT Student               Clinical Impression     Row Name 10/12/21 1678          Pain Scale: Numbers Pre/Post-Treatment    Pretreatment Pain Rating 8/10  -MW (r) EJ (t) MW (c)     Posttreatment Pain Rating 8/10  -MW (r) EJ (t) MW (c)     Pain Location tooth  -MW (r) EJ (t) MW (c)     Pre/Posttreatment Pain Comment --  -MW (r) EJ (t) MW (c)     Pain Intervention(s) Repositioned; Ambulation/increased activity  -MW (r) EJ (t) MW (c)     Row Name 10/12/21 1505          Plan of Care Review    Plan of Care Reviewed With patient  -MW (r) EJ (t) MW (c)     Progress improving  -MW (r) EJ (t) MW (c)     Outcome Summary OT tx completed. Physician in  upon arrival discussing dc plans with pt. pt plans to dc to [facility] today for cont rehab. Pt able to TBD I'ly with CGA for stand only. She was able to manage all fine motor coordination tasks needed for dressing with no cues needed. pt sit to stand and ambulates to the whiting and back CGA. She is anxious to dc and begins to pack all of her personal belongings into her bag. Dc to [facility] today.  -MW (r) EJ (t) MW (c)     Row Name 10/12/21 2252          Therapy Assessment/Plan (OT)    Patient/Family Therapy Goal Statement (OT) go to [facility]  -MW (r) EJ (t) MW (c)     Row Name 10/12/21 1508          Vital  Signs    O2 Delivery Pre Treatment room air  -MW (r) EJ (t) MW (c)     O2 Delivery Intra Treatment room air  -MW (r) EJ (t) MW (c)     O2 Delivery Post Treatment room air  -MW (r) EJ (t) MW (c)     Row Name 10/12/21 1508          Positioning and Restraints    Pre-Treatment Position sitting in chair/recliner  -MW (r) EJ (t) MW (c)     Post Treatment Position chair  -MW (r) EJ (t) MW (c)     In Chair sitting; call light within reach; encouraged to call for assist  -MW (r) EJ (t) MW (c)           User Key  (r) = Recorded By, (t) = Taken By, (c) = Cosigned By    Initials Name Provider Type    Neda Greene, OTR/L Occupational Therapist    Carmita Castañeda, OT Student OT Student               Outcome Measures     Row Name 10/12/21 1504          How much help from another is currently needed...    Putting on and taking off regular lower body clothing? 4  -MW (r) EJ (t) MW (c)     Bathing (including washing, rinsing, and drying) 3  -MW (r) EJ (t) MW (c)     Toileting (which includes using toilet bed pan or urinal) 3  -MW (r) EJ (t) MW (c)     Putting on and taking off regular upper body clothing 4  -MW (r) EJ (t) MW (c)     Taking care of personal grooming (such as brushing teeth) 4  -MW (r) EJ (t) MW (c)     Eating meals 4  -MW (r) EJ (t) MW (c)     AM-PAC 6 Clicks Score (OT) 22  -MW (r) EJ (t)           User Key  (r) = Recorded By, (t) = Taken By, (c) = Cosigned By    Initials Name Provider Type    Neda Greene, OTR/L Occupational Therapist    Carmita Castañeda, OT Student OT Student              Occupational Therapy Education                 Title: PT OT SLP Therapies (Resolved)     Topic: Occupational Therapy (Resolved)     Point: ADL training (Resolved)     Description:   Instruct learner(s) on proper safety adaptation and remediation techniques during self care or transfers.   Instruct in proper use of assistive devices.              Learning Progress Summary           Patient Acceptance, E,TB,D,  VU,DU,NR by RUTHANN at 10/12/2021 1516    Acceptance, E, VU by SAM at 10/8/2021 0812                   Point: Home exercise program (Resolved)     Description:   Instruct learner(s) on appropriate technique for monitoring, assisting and/or progressing therapeutic exercises/activities.              Learning Progress Summary           Patient Acceptance, E,TB,D, VU,DU,NR by RUTHANN at 10/12/2021 1516                   Point: Precautions (Resolved)     Description:   Instruct learner(s) on prescribed precautions during self-care and functional transfers.              Learning Progress Summary           Patient Acceptance, E,TB,D, VU,DU,NR by RUTHANN at 10/12/2021 1516                   Point: Body mechanics (Resolved)     Description:   Instruct learner(s) on proper positioning and spine alignment during self-care, functional mobility activities and/or exercises.              Learning Progress Summary           Patient Acceptance, E,TB,D, VU,DU,NR by RUTHANN at 10/12/2021 1516                               User Key     Initials Effective Dates Name Provider Type Discipline    SMA 06/16/21 -  Desire Keita OTR/L Occupational Therapist OT     08/04/21 -  Carmita Ng OT Student OT Student OT              OT Recommendation and Plan  Retired Outcome Summary/Treatment Plan (OT)  Anticipated Discharge Disposition (OT): skilled nursing facility  Plan of Care Review  Plan of Care Reviewed With: patient  Progress: improving  Outcome Summary: OT tx completed. Physician in  upon arrival discussing dc plans with pt. pt plans to dc to [facility] today for cont rehab. Pt able to TBD I'ly with CGA for stand only. She was able to manage all fine motor coordination tasks needed for dressing with no cues needed. pt sit to stand and ambulates to the whiting and back CGA. She is anxious to dc and begins to pack all of her personal belongings into her bag. Dc to [facility] today.  Plan of Care Reviewed With: patient  Outcome Summary: OT tx completed.  Physician in  upon arrival discussing dc plans with pt. pt plans to dc to [facility] today for cont rehab. Pt able to TBD I'ly with CGA for stand only. She was able to manage all fine motor coordination tasks needed for dressing with no cues needed. pt sit to stand and ambulates to the whiting and back CGA. She is anxious to dc and begins to pack all of her personal belongings into her bag. Dc to [facility] today.     Time Calculation:    Time Calculation- OT     Row Name 10/12/21 1508             Time Calculation- OT    OT Start Time 1508  -MW (r) EJ (t) MW (c)      OT Stop Time 1523  -MW (r) EJ (t) MW (c)      OT Time Calculation (min) 15 min  -MW (r) EJ (t)      Total Timed Code Minutes- OT 15 minute(s)  -MW (r) EJ (t) MW (c)      OT Received On 10/12/21  -MW (r) EJ (t) MW (c)              Timed Charges    48382 - OT Self Care/Mgmt Minutes 15  -MW (r) EJ (t) MW (c)              Total Minutes    Timed Charges Total Minutes 15  -MW (r) EJ (t)       Total Minutes 15  -MW (r) EJ (t)            User Key  (r) = Recorded By, (t) = Taken By, (c) = Cosigned By    Initials Name Provider Type    Neda Greene, OTR/L Occupational Therapist    Manoj Castañeda OT Student OT Student                       MANOJ SALEH OT Student  10/12/2021

## 2021-10-12 NOTE — PLAN OF CARE
Goal Outcome Evaluation:                 Pt sleeping for much of shift. Pt denies pain. Pt denies pain. No distress noted.

## 2021-10-12 NOTE — PLAN OF CARE
Goal Outcome Evaluation:            Pt possibly going to [facility] today. Pt went this AM for dialysis and 2 liters was taking off. Blood pressure this afternoon elevated in which scheduled hydralazine and clonidine given. Will continue to monitor blood pressure. Waiting for discharge orders.

## 2021-10-12 NOTE — PLAN OF CARE
Problem: Palliative Care  Goal: Enhanced Quality of Life  Intervention: Optimize Psychosocial Wellbeing  Flowsheets (Taken 10/12/2021 1050)  Supportive Measures:   active listening utilized   decision-making supported   positive reinforcement provided   verbalization of feelings encouraged    Patient currently in dialysis. She was alert and oriented X4. She is pleasant and smiled often. She stated that she is currently having minimal pain with her right side broken tooth. She stated this makes it difficult to eat. She is not nauseated. She is resting at night. She feels she is ready to discharge to SNF. She is looking forward to rehab and gaining her strength and mobility. Conversation about her plans moving forward and continued care goals. Patient appreciative of conversation.     Will continue to follow until discharge.    AYANNA Velazquez  10/12/2021

## 2021-10-12 NOTE — NURSING NOTE
Called report to Ellen at [facility] for patient transfer to SNF. Pt to transport via cab service.

## 2021-10-12 NOTE — PAYOR COMM NOTE
"10/12/21 [facility] [phone]  [phone]      ATTENTION ::: NICK    UPDATE CLINICAL DUE TODAY ON 10/12/21 FOR CONT STAY.    AUTH # [id number]          Ena Upton (51 y.o. Female)             Date of Birth Social Security Number Address Home Phone MRN    1970  [address] [address] [phone] [id number]    [employer] Marital Status             Mormonism        Admission Date Admission Type Admitting Provider Attending Provider Department, Room/Bed    10/4/21 Emergency Harrison Moya MD [place], Glenn Riego, MD [facility] 3C, 361/1    Discharge Date Discharge Disposition Discharge Destination                         Attending Provider: Harrison Moya MD    Allergies: Penicillins, Lamisil [Terbinafine], Reglan [Metoclopramide], Stadol [Butorphanol]    Isolation: None   Infection: None   Code Status: No CPR   Advance Care Planning Activity    Ht: 158 cm (62.21\")   Wt: 81.1 kg (178 lb 14.4 oz)    Admission Cmt: None   Principal Problem: Hyperosmolar hyperglycemic state (HHS) (HCC) [E11.00,E11.65]                 Active Insurance as of 10/4/2021     Primary Coverage     Payor Plan Insurance Group Employer/Plan Group    [facility] MEDICARE REPLACEMENT [employer] MEDICARE REPLACEMENT      Payor Plan Address Payor Plan Phone Number Payor Plan Fax Number Effective Dates    [address] [address] [phone]  6/1/2021 - None Entered    [place] [address]       Subscriber Name Subscriber Birth Date Member ID       ENA UPTON 1970 [id number]           Secondary Coverage     Payor Plan Insurance Group Employer/Plan Group    [place]      Payor Plan Address Payor Plan Phone Number Payor Plan Fax Number Effective Dates    [address] [phone]  8/17/2021 - None Entered    [place] [address]       Subscriber Name Subscriber Birth Date Member ID       ENA UPTON 1970 [id number]        "          Emergency Contacts      (Rel.) Home Phone Work Phone Mobile Phone    Kassandra Paez (Sister) [phone] -- [phone]    Dmitriy Galvez (Friend) -- -- [phone]                                [facility] Encounter Date/Time: 10/4/2021 [facility]   Hospital Account: [id number]    MRN: [id number]   Patient:  Chemo Upton   Contact Serial #: [id number]   SSN:          ENCOUNTER             Patient Class: Inpatient   Unit: [address] Service: Medicine     Bed: 361/1   Admitting Provider: Harrison Moya*   Referring Physician:     Attending Provider: Harrison Moya*   Adm Diagnosis: Hyperosmolar hyperglycem*               PATIENT          Name: Chemo Upton : 1970 (51 yrs)   Address: [address] [place] [address] Sex: Female   City: [place] [address]   County: [facility] [place]   Marital Status:  Ethnicity: NOT                                                                              Race: WHITE   Primary Care Provider: Yaron Wiggins AP* Patients Phone: Home Phone: [phone]     Mobile Phone: [phone]   EMERGENCY CONTACT   Contact Name Legal Guardian? Relationship to Patient Home Phone Work Phone   1. Kassandra Paez  2. Dmitriy Galvez    No Sister  Friend [phone]              GUARANTOR            Guarantor: Chemo Upton     : 1970   Address: [address] [place] [address] Sex: Female     [place] [address]     Relation to Patient: Self       Home Phone: [phone]   Guarantor ID: [id number]       Work Phone:     GUARANTOR EMPLOYER   Employer:           Status: DISABLED   COVERAGE          PRIMARY INSURANCE   Payor: [facility] MEDICARE REPLACEMENT Plan: [facility]   Group Number:   Insurance Type: INDEMNITY   Subscriber Name: UPTONINDIA ROLLINSCHEMO A Subscriber : 1970   Subscriber ID: [id number] Coverage Address: [facility] [address]  [place] [address]   Pat. Rel. to Subscriber: Self Coverage Phone: [phone]   SECONDARY  INSURANCE   Payor[place] Plan[place]   Group Number:   Insurance Type: INDEMNITY   Subscriber Name: CHEMO BIRD MAGY Subscriber : 1970   Subscriber ID: [id number] Coverage Address: [place] [address]   Pat. Rel. to Subscriber: SELF Coverage Phone: [phone]      Contact Serial # ([id number])  `       2021    Chart ID ([id number])

## 2021-10-12 NOTE — DISCHARGE SUMMARY
[facility] Medicine Services  DISCHARGE SUMMARY       Date of Admission: 10/4/2021  Date of Discharge:  10/12/2021  Primary Care Physician: Yaron Wiggins APRN    Discharge Diagnoses:  Active Hospital Problems    Diagnosis    • **Hyperosmolar hyperglycemic state (HHS) (HCC)    • Hyponatremia    • Uremic encephalopathy syndrome    • Hypertensive emergency    • Hypothyroidism    • End stage kidney disease (HCC)          Presenting Problem/History of Present Illness:  Hyperosmolar hyperglycemic state (HHS) (HCC) [E11.00, E11.65]  Hyperosmolar hyperglycemic state (HHS) (HCC) [E11.00, E11.65]         Hospital Course  She is 51-year-old woman admitted for altered mental status and was managed for hyperosmolar hyperglycemic syndrome.  She carries history of end-stage renal disease on dialysis every Tuesday Thursday and Saturday through her left AV fistula.  She is diabetic and has hypertension.  She is on multiple antihypertensive medications to address her blood pressure.  Encephalopathy is now resolved.  Suspect that this is related to hyperglycemic hyperosmolar state and missed dialysis.  Patient also had positive hCG which was worked up including CT of the abdomen, and pelvic ultrasound.  Overall findings reportedly not consistent with pregnancy or tomorrow.  The interested reader is referred to the report for details  Patient told me that she hasn't had sex for 6 years and had uterine ablation.    Patient received dialysis as per direction by nephrology.    Patient is felt to be at her best condition and medically appropriate for transfer to skilled nursing facility.    There are medications were not continued on admission likely because of altered mental status.  I will defer to primary care provider resumption as necessary of some of the other medications so it can be closely monitored for its effect.        Procedures Performed:   None    Consults:     Sadi    Pertinent Test Results:   Lab Results (last 7 days)     Procedure Component Value Units Date/Time    COVID PRE-OP / PRE-PROCEDURE SCREENING ORDER (NO ISOLATION) - Swab, Nasal Cavity [[id number]]  (Normal) Collected: 10/12/21 1355    Specimen: Swab from Nasal Cavity Updated: 10/12/21 1447    Narrative:      The following orders were created for panel order COVID PRE-OP / PRE-PROCEDURE SCREENING ORDER (NO ISOLATION) - Swab, Nasal Cavity.  Procedure                               Abnormality         Status                     ---------                               -----------         ------                     COVID-19,[facility]...[[id number]]  Normal              Final result                 Please view results for these tests on the individual orders.    COVID-19,[facility] IN-HOUSE,Nasal Swab No Transport Media 3-4 HR TAT - Swab, Nasal Cavity [[id number]]  (Normal) Collected: 10/12/21 1355    Specimen: Swab from Nasal Cavity Updated: 10/12/21 1447     COVID19 Not Detected    Narrative:      Fact sheet for providers: https://www.fda.gov/media/869580/download     Fact sheet for patients: https://www.fda.gov/media/982563/download    Test performed by PCR.    Consider negative results in combination with clinical observations, patient history, and epidemiological information.  Fact sheet for providers: https://www.fda.gov/media/073232/download     Fact sheet for patients: https://www.fda.gov/media/493885/download    Test performed by PCR.    Consider negative results in combination with clinical observations, patient history, and epidemiological information.    POC Glucose Once [[id number]]  (Abnormal) Collected: 10/12/21 0735    Specimen: Blood Updated: 10/12/21 0747     Glucose 217 mg/dL      Comment: : [id number] Cheryl Mendez MinalMeter ID: [id number]       POC Glucose Once [[id number]]  (Abnormal) Collected: 10/11/21 2132    Specimen: Blood Updated: 10/11/21 2153     Glucose 330 mg/dL       Comment: : [id number] Jaja Camacho ID: [id number]       POC Glucose Once [[id number]]  (Abnormal) Collected: 10/11/21 1633    Specimen: Blood Updated: 10/11/21 1645     Glucose 249 mg/dL      Comment: : [id number] Cheryl CoyleiseMeter ID: [id number]       POC Glucose Once [[id number]]  (Abnormal) Collected: 10/11/21 1120    Specimen: Blood Updated: 10/11/21 1131     Glucose 223 mg/dL      Comment: : [id number] Cheryl CoyleiseMeter ID: [id number]       POC Glucose Once [[id number]]  (Abnormal) Collected: 10/11/21 0726    Specimen: Blood Updated: 10/11/21 0737     Glucose 236 mg/dL      Comment: : [id number] Cheryl CoyleiseMeter ID: [id number]       POC Glucose Once [[id number]]  (Abnormal) Collected: 10/10/21 2120    Specimen: Blood Updated: 10/10/21 2141     Glucose 222 mg/dL      Comment: : [id number] Lara TariqMeter ID: [id number]       POC Glucose Once [[id number]]  (Abnormal) Collected: 10/10/21 1638    Specimen: Blood Updated: 10/10/21 1649     Glucose 234 mg/dL      Comment: : [id number] Chen Tayla  LMeter ID: [id number]       POC Glucose Once [[id number]]  (Abnormal) Collected: 10/10/21 1206    Specimen: Blood Updated: 10/10/21 1217     Glucose 157 mg/dL      Comment: : [id number] Andrea Calle ID: [id number]       POC Glucose Once [[id number]]  (Abnormal) Collected: 10/10/21 0628    Specimen: Blood Updated: 10/10/21 0640     Glucose 194 mg/dL      Comment: : [id number] Shannon StockMeter ID: [id number]       Basic Metabolic Panel [[id number]]  (Abnormal) Collected: 10/10/21 0249    Specimen: Blood Updated: 10/10/21 0332     Glucose 190 mg/dL      BUN 17 mg/dL      Creatinine 3.48 mg/dL      Sodium 133 mmol/L      Potassium 4.1 mmol/L      Chloride 96 mmol/L      CO2 27.0 mmol/L      Calcium 8.3 mg/dL      eGFR   Amer --     Comment: <15 Indicative of kidney failure.        eGFR Non African Amer 14 mL/min/1.73      Comment: <15 Indicative  of kidney failure.        BUN/Creatinine Ratio 4.9     Anion Gap 10.0 mmol/L     Narrative:      GFR Normal >60  Chronic Kidney Disease <60  Kidney Failure <15      CBC (No Diff) [[id number]]  (Abnormal) Collected: 10/10/21 0249    Specimen: Blood Updated: 10/10/21 0315     WBC 3.95 10*3/mm3      RBC 2.95 10*6/mm3      Hemoglobin 8.6 g/dL      Hematocrit 28.3 %      MCV 95.9 fL      MCH 29.2 pg      MCHC 30.4 g/dL      RDW 15.6 %      RDW-SD 54.5 fl      MPV 10.0 fL      Platelets 120 10*3/mm3     Blood Culture - Blood, Arm, Right [[id number]]  (Normal) Collected: 10/04/21 2344    Specimen: Blood from Arm, Right Updated: 10/10/21 0016     Blood Culture No growth at 5 days    Narrative:      The previously reported component GRAM STAIN is no longer being reported. Previous result was <null> (Reference Range: [Reference Range]) on 10/9/2021 at 0015 CDT.    Blood Culture - Blood, Arm, Right [[id number]]  (Normal) Collected: 10/05/21 0003    Specimen: Blood from Arm, Right Updated: 10/10/21 0016     Blood Culture No growth at 5 days    Narrative:      The previously reported component GRAM STAIN is no longer being reported. Previous result was <null> (Reference Range: [Reference Range]) on 10/9/2021 at 0015 CDT.    POC Glucose Once [[id number]]  (Abnormal) Collected: 10/09/21 2025    Specimen: Blood Updated: 10/09/21 2036     Glucose 261 mg/dL      Comment: : [id number] Shannon Castañeda ID: [id number]       POC Glucose Once [[id number]]  (Abnormal) Collected: 10/09/21 1803    Specimen: Blood Updated: 10/09/21 1816     Glucose 331 mg/dL      Comment: : [id number] Andrea Calle ID: [id number]       POC Glucose Once [[id number]]  (Normal) Collected: 10/09/21 0903    Specimen: Blood Updated: 10/09/21 0915     Glucose 106 mg/dL      Comment: : [id number] Andrea Calle ID: [id number]       POC Glucose Once [[id number]]  (Abnormal) Collected: 10/09/21 0626    Specimen: Blood Updated: 10/09/21 0637     Glucose  157 mg/dL      Comment: : [id number] Shannon StockMeter ID: [id number]       Basic Metabolic Panel [[id number]]  (Abnormal) Collected: 10/09/21 0317    Specimen: Blood Updated: 10/09/21 0442     Glucose 182 mg/dL      BUN 23 mg/dL      Creatinine 4.62 mg/dL      Sodium 129 mmol/L      Potassium 3.8 mmol/L      Chloride 94 mmol/L      CO2 24.0 mmol/L      Calcium 8.3 mg/dL      eGFR   Amer --     Comment: <15 Indicative of kidney failure.        eGFR Non African Amer 10 mL/min/1.73      Comment: <15 Indicative of kidney failure.        BUN/Creatinine Ratio 5.0     Anion Gap 11.0 mmol/L     Narrative:      GFR Normal >60  Chronic Kidney Disease <60  Kidney Failure <15      CBC (No Diff) [[id number]]  (Abnormal) Collected: 10/09/21 0317    Specimen: Blood Updated: 10/09/21 0420     WBC 3.65 10*3/mm3      RBC 2.96 10*6/mm3      Hemoglobin 8.5 g/dL      Hematocrit 28.0 %      MCV 94.6 fL      MCH 28.7 pg      MCHC 30.4 g/dL      RDW 15.9 %      RDW-SD 55.3 fl      MPV 10.8 fL      Platelets 103 10*3/mm3     POC Glucose Once [[id number]]  (Abnormal) Collected: 10/08/21 2123    Specimen: Blood Updated: 10/08/21 2135     Glucose 240 mg/dL      Comment: : [id number] Shannon StockMeter ID: [id number]       POC Glucose Once [[id number]]  (Abnormal) Collected: 10/08/21 1759    Specimen: Blood Updated: 10/08/21 1810     Glucose 215 mg/dL      Comment: : [id number] Kiki Ghada Jordan ID: [id number]       T4, Free [[id number]]  (Abnormal) Collected: 10/08/21 1125    Specimen: Blood Updated: 10/08/21 1204     Free T4 0.90 ng/dL     Narrative:      Results may be falsely increased if patient taking Biotin.      TSH [[id number]]  (Abnormal) Collected: 10/08/21 1125    Specimen: Blood Updated: 10/08/21 1203     TSH 31.560 uIU/mL     POC Glucose Once [[id number]]  (Abnormal) Collected: 10/08/21 1151    Specimen: Blood Updated: 10/08/21 1202     Glucose 262 mg/dL      Comment: : [id number] Kiki Jordan ID:  [id number]       Comprehensive Metabolic Panel [[id number]]  (Abnormal) Collected: 10/08/21 1125    Specimen: Blood Updated: 10/08/21 1159     Glucose 273 mg/dL      BUN 18 mg/dL      Creatinine 3.52 mg/dL      Sodium 133 mmol/L      Potassium 3.8 mmol/L      Chloride 96 mmol/L      CO2 24.0 mmol/L      Calcium 8.4 mg/dL      Total Protein 5.7 g/dL      Albumin 3.30 g/dL      ALT (SGPT) 6 U/L      AST (SGOT) 17 U/L      Alkaline Phosphatase 99 U/L      Total Bilirubin 0.4 mg/dL      eGFR Non African Amer 14 mL/min/1.73      Comment: <15 Indicative of kidney failure.        eGFR   Amer --     Comment: <15 Indicative of kidney failure.        Globulin 2.4 gm/dL      A/G Ratio 1.4 g/dL      BUN/Creatinine Ratio 5.1     Anion Gap 13.0 mmol/L     Narrative:      GFR Normal >60  Chronic Kidney Disease <60  Kidney Failure <15      POC Glucose Once [[id number]]  (Abnormal) Collected: 10/08/21 0703    Specimen: Blood Updated: 10/08/21 0714     Glucose 131 mg/dL      Comment: : [id number] Gabriele Nieevs ID: [id number]       CBC & Differential [[id number]]  (Abnormal) Collected: 10/08/21 0221    Specimen: Blood Updated: 10/08/21 0344    Narrative:      The following orders were created for panel order CBC & Differential.  Procedure                               Abnormality         Status                     ---------                               -----------         ------                     CBC Auto Differential[[id number]]        Abnormal            Final result                 Please view results for these tests on the individual orders.    CBC Auto Differential [[id number]]  (Abnormal) Collected: 10/08/21 0221    Specimen: Blood Updated: 10/08/21 0344     WBC 4.02 10*3/mm3      RBC 3.08 10*6/mm3      Hemoglobin 9.1 g/dL      Hematocrit 28.7 %      MCV 93.2 fL      MCH 29.5 pg      MCHC 31.7 g/dL      RDW 16.2 %      RDW-SD 55.4 fl      MPV 10.9 fL      Platelets 91 10*3/mm3      Neutrophil % 58.8 %       Lymphocyte % 23.1 %      Monocyte % 13.4 %      Eosinophil % 3.5 %      Basophil % 1.2 %      Immature Grans % 0.0 %      Neutrophils, Absolute 2.36 10*3/mm3      Lymphocytes, Absolute 0.93 10*3/mm3      Monocytes, Absolute 0.54 10*3/mm3      Eosinophils, Absolute 0.14 10*3/mm3      Basophils, Absolute 0.05 10*3/mm3      Immature Grans, Absolute 0.00 10*3/mm3      nRBC 0.0 /100 WBC     POC Glucose Once [[id number]]  (Abnormal) Collected: 10/07/21 2201    Specimen: Blood Updated: 10/07/21 2212     Glucose 234 mg/dL      Comment: : [id number] Gabriele DanielMeter ID: [id number]       POC Glucose Once [[id number]]  (Abnormal) Collected: 10/07/21 1712    Specimen: Blood Updated: 10/07/21 1723     Glucose 185 mg/dL      Comment: : [id number] [employer] AMeter ID: [id number]       POC Glucose Once [[id number]]  (Normal) Collected: 10/07/21 1217    Specimen: Blood Updated: 10/07/21 1228     Glucose 91 mg/dL      Comment: : [id number] [employer] AMeter ID: [id number]       POC Glucose Once [[id number]]  (Normal) Collected: 10/07/21 0526    Specimen: Blood Updated: 10/07/21 0537     Glucose 72 mg/dL      Comment: : [id number] Gabriele Thar PharmaceuticalselMeter ID: [id number]       Basic Metabolic Panel [[id number]]  (Abnormal) Collected: 10/07/21 0327    Specimen: Blood Updated: 10/07/21 0509     Glucose 81 mg/dL      BUN 22 mg/dL      Creatinine 3.79 mg/dL      Sodium 127 mmol/L      Potassium 3.5 mmol/L      Chloride 95 mmol/L      CO2 22.0 mmol/L      Calcium 8.3 mg/dL      eGFR   Amer --     Comment: <15 Indicative of kidney failure.        eGFR Non African Amer 13 mL/min/1.73      Comment: <15 Indicative of kidney failure.        BUN/Creatinine Ratio 5.8     Anion Gap 10.0 mmol/L     Narrative:      GFR Normal >60  Chronic Kidney Disease <60  Kidney Failure <15      hCG, Quantitative, Pregnancy [[id number]] Collected: 10/07/21 0327    Specimen: Blood Updated: 10/07/21 0455     HCG Quantitative 6.57 mIU/mL      Narrative:      HCG Ranges by Gestational Age    Females - non-pregnant premenopausal   </= 1mIU/mL HCG  Females - postmenopausal               </= 7mIU/mL HCG    3 Weeks         5.8 -    71.2 mIU/mL  4 Weeks         9.5 -     750 mIU/mL  5 Weeks         217 -   7,138 mIU/mL  6 Weeks         158 -  31,795 mIU/mL  7 Weeks       3,697 - 163,563 mIU/mL  8 Weeks      32,065 - 149,571 mIU/mL  9 Weeks      63,803 - 151,410 mIU/mL  10 Weeks     46,509 - 186,977 mIU/mL  12 Weeks     27,832 - 210,612 mIU/mL  14 Weeks     13,950 -  62,530 mIU/mL  15 Weeks     12,039 -  70,971 mIU/mL  16 Weeks      9,040 -  56,451 mIU/mL  17 Weeks      8,175 -  55,868 mIU/mL  18 Weeks      8,099 -  58,176 mIU/mL  Results may be falsely decreased if patient taking Biotin.      CBC & Differential [[id number]]  (Abnormal) Collected: 10/07/21 0327    Specimen: Blood Updated: 10/07/21 0435    Narrative:      The following orders were created for panel order CBC & Differential.  Procedure                               Abnormality         Status                     ---------                               -----------         ------                     CBC Auto Differential[[id number]]        Abnormal            Final result                 Please view results for these tests on the individual orders.    CBC Auto Differential [[id number]]  (Abnormal) Collected: 10/07/21 0327    Specimen: Blood Updated: 10/07/21 0435     WBC 6.06 10*3/mm3      RBC 3.26 10*6/mm3      Hemoglobin 9.5 g/dL      Hematocrit 29.2 %      MCV 89.6 fL      MCH 29.1 pg      MCHC 32.5 g/dL      RDW 15.9 %      RDW-SD 52.6 fl      MPV 11.1 fL      Platelets 128 10*3/mm3      Neutrophil % 67.2 %      Lymphocyte % 17.7 %      Monocyte % 10.6 %      Eosinophil % 3.1 %      Basophil % 1.2 %      Immature Grans % 0.2 %      Neutrophils, Absolute 4.08 10*3/mm3      Lymphocytes, Absolute 1.07 10*3/mm3      Monocytes, Absolute 0.64 10*3/mm3      Eosinophils, Absolute 0.19 10*3/mm3       Basophils, Absolute 0.07 10*3/mm3      Immature Grans, Absolute 0.01 10*3/mm3      nRBC 0.0 /100 WBC     POC Glucose Once [[id number]]  (Normal) Collected: 10/06/21 2357    Specimen: Blood Updated: 10/07/21 0008     Glucose 123 mg/dL      Comment: : [id number] Toan Larios ID: [id number]       POC Glucose Once [[id number]]  (Abnormal) Collected: 10/06/21 2011    Specimen: Blood Updated: 10/06/21 2022     Glucose 180 mg/dL      Comment: : [id number] Gabriele DanielMeter ID: [id number]       POC Glucose Once [[id number]]  (Abnormal) Collected: 10/06/21 1754    Specimen: Blood Updated: 10/06/21 1806     Glucose 188 mg/dL      Comment: : [id number] Yue SotelonMeter ID: [id number]       POC Glucose Once [[id number]]  (Normal) Collected: 10/06/21 1159    Specimen: Blood Updated: 10/06/21 1210     Glucose 117 mg/dL      Comment: : [id number] Yue SotelonMeter ID: [id number]       Urine Culture - Urine, Urine, Catheter [[id number]] Collected: 10/05/21 0031    Specimen: Urine, Catheter Updated: 10/06/21 1143     Urine Culture <10,000 CFU/mL Mixed Lilly Isolated    Narrative:      Specimen contains mixed organisms of questionable pathogenicity which indicates contamination with commensal lilly.  Further identification is unlikely to provide clinically useful information.  Suggest recollection.    POC Glucose Once [[id number]]  (Abnormal) Collected: 10/06/21 0520    Specimen: Blood Updated: 10/06/21 0531     Glucose 144 mg/dL      Comment: : [id number] Gabriele DanielMeter ID: [id number]       POC Glucose Once [[id number]]  (Normal) Collected: 10/06/21 0021    Specimen: Blood Updated: 10/06/21 0033     Glucose 109 mg/dL      Comment: : [id number] Gabriele DanielMeter ID: [id number]       Osmolality, Serum [[id number]]  (Abnormal) Collected: 10/05/21 2309    Specimen: Blood Updated: 10/05/21 2349     Osmolality 267 mOsm/kg     Basic Metabolic Panel [[id number]]  (Abnormal) Collected: 10/05/21 0396     Specimen: Blood Updated: 10/05/21 2340     Glucose 99 mg/dL      BUN 18 mg/dL      Creatinine 2.88 mg/dL      Sodium 128 mmol/L      Potassium 3.6 mmol/L      Chloride 93 mmol/L      CO2 23.0 mmol/L      Calcium 8.4 mg/dL      eGFR Non African Amer 17 mL/min/1.73      BUN/Creatinine Ratio 6.3     Anion Gap 12.0 mmol/L     Narrative:      GFR Normal >60  Chronic Kidney Disease <60  Kidney Failure <15      Phosphorus [[id number]]  (Abnormal) Collected: 10/05/21 2309    Specimen: Blood Updated: 10/05/21 2337     Phosphorus 2.1 mg/dL     Magnesium [[id number]]  (Normal) Collected: 10/05/21 2309    Specimen: Blood Updated: 10/05/21 2334     Magnesium 1.7 mg/dL     CBC & Differential [[id number]]  (Abnormal) Collected: 10/05/21 2309    Specimen: Blood Updated: 10/05/21 2329    Narrative:      The following orders were created for panel order CBC & Differential.  Procedure                               Abnormality         Status                     ---------                               -----------         ------                     CBC Auto Differential[[id number]]        Abnormal            Final result                 Please view results for these tests on the individual orders.    CBC Auto Differential [[id number]]  (Abnormal) Collected: 10/05/21 2309    Specimen: Blood Updated: 10/05/21 2329     WBC 10.03 10*3/mm3      RBC 3.62 10*6/mm3      Hemoglobin 10.6 g/dL      Hematocrit 30.5 %      MCV 84.3 fL      MCH 29.3 pg      MCHC 34.8 g/dL      RDW 14.9 %      RDW-SD 46.0 fl      MPV 10.9 fL      Platelets 125 10*3/mm3      Neutrophil % 75.8 %      Lymphocyte % 12.2 %      Monocyte % 9.4 %      Eosinophil % 1.6 %      Basophil % 0.6 %      Neutrophils, Absolute 7.61 10*3/mm3      Lymphocytes, Absolute 1.22 10*3/mm3      Monocytes, Absolute 0.94 10*3/mm3      Eosinophils, Absolute 0.16 10*3/mm3      Basophils, Absolute 0.06 10*3/mm3     POC Glucose Once [[id number]]  (Normal) Collected: 10/05/21 2257    Specimen: Blood  Updated: 10/05/21 2308     Glucose 97 mg/dL      Comment: : [id number] Gabriele DanielMeter ID: [id number]       POC Glucose Once [[id number]]  (Normal) Collected: 10/05/21 2205    Specimen: Blood Updated: 10/05/21 2216     Glucose 88 mg/dL      Comment: : [id number] Gabriele DanielMeter ID: [id number]       POC Glucose Once [[id number]]  (Normal) Collected: 10/05/21 2118    Specimen: Blood Updated: 10/05/21 2130     Glucose 119 mg/dL      Comment: : [id number] Gabriele DanielMeter ID: [id number]       POC Glucose Once [[id number]]  (Normal) Collected: 10/05/21 2051    Specimen: Blood Updated: 10/05/21 2102     Glucose 84 mg/dL      Comment: : [id number] Gabriele DanielMeter ID: [id number]       Basic Metabolic Panel [[id number]]  (Abnormal) Collected: 10/05/21 2029    Specimen: Blood Updated: 10/05/21 2054     Glucose 106 mg/dL      BUN 18 mg/dL      Creatinine 2.84 mg/dL      Sodium 129 mmol/L      Potassium 3.5 mmol/L      Chloride 93 mmol/L      CO2 23.0 mmol/L      Calcium 8.1 mg/dL      eGFR Non African Amer 17 mL/min/1.73      BUN/Creatinine Ratio 6.3     Anion Gap 13.0 mmol/L     Narrative:      GFR Normal >60  Chronic Kidney Disease <60  Kidney Failure <15      POC Glucose Once [[id number]]  (Normal) Collected: 10/05/21 1939    Specimen: Blood Updated: 10/05/21 1950     Glucose 116 mg/dL      Comment: : [id number] Gabriele AlexanderelMeter ID: [id number]       Basic Metabolic Panel [[id number]]  (Abnormal) Collected: 10/05/21 1805    Specimen: Blood Updated: 10/05/21 1828     Glucose 179 mg/dL      BUN 17 mg/dL      Creatinine 2.62 mg/dL      Sodium 127 mmol/L      Potassium 3.5 mmol/L      Chloride 91 mmol/L      CO2 23.0 mmol/L      Calcium 8.2 mg/dL      eGFR Non African Amer 19 mL/min/1.73      BUN/Creatinine Ratio 6.5     Anion Gap 13.0 mmol/L     Narrative:      GFR Normal >60  Chronic Kidney Disease <60  Kidney Failure <15      POC Glucose Once [[id number]]  (Abnormal) Collected: 10/05/21  "1804    Specimen: Blood Updated: 10/05/21 1815     Glucose 183 mg/dL      Comment: : [id number] Carballo AllysonMeter ID: [id number]       POC Glucose Once [[id number]]  (Abnormal) Collected: 10/05/21 1706    Specimen: Blood Updated: 10/05/21 1718     Glucose 201 mg/dL      Comment: : [id number] Carballo AllysonMeter ID: [id number]       POC Glucose Once [[id number]]  (Abnormal) Collected: 10/05/21 1553    Specimen: Blood Updated: 10/05/21 1604     Glucose 274 mg/dL      Comment: : [id number] Carballo AllysonMeter ID: [id number]             Condition on Discharge:   stable  Physical Exam on Discharge:  /66 (BP Location: Right arm)   Pulse 63   Temp 97.4 °F (36.3 °C)   Resp 18   Ht 158 cm (62.21\")   Wt 81.1 kg (178 lb 14.4 oz)   SpO2 100%   BMI 32.51 kg/m²   Physical Exam  Musculoskeletal:      Cervical back: Normal range of motion.       Seated comfortably on  Recliner  No distress  Interactive  Patient has no gross oral lesions however has poor oral hygiene and likely cavity on the molar right bottom  GEN: Awake, alert, interactive, in NAD  HEENT: Atraumatic, PERRLA, EOMI, Anicteric, Trachea midline  Lungs: CTAB, no wheezing/rales/rhonchi  Heart: RRR, +S1/s2, no rub + murmur  ABD: soft, nt/nd, +BS, no guarding/rebound  Extremities: atraumatic, no cyanosis, no edema  Skin: no rashes or lesions; av fistula left arm  Neuro: AAOx3, no focal deficits  Psych: normal mood & affect    Discharge Disposition:  Skilled Nursing Facility (DC - External)    Discharge Medications:     Discharge Medications      New Medications      Instructions Start Date   acetaminophen 325 MG tablet  Commonly known as: TYLENOL   650 mg, Oral, Every 6 Hours PRN      spironolactone 25 MG tablet  Commonly known as: ALDACTONE   25 mg, Oral, Daily   Start Date: October 13, 2021        Changes to Medications      Instructions Start Date   isosorbide mononitrate 60 MG 24 hr tablet  Commonly known as: IMDUR  What changed:   · medication " strength  · how much to take  · when to take this   60 mg, Oral, Every 24 Hours Scheduled   Start Date: October 13, 2021        Continue These Medications      Instructions Start Date   albuterol sulfate  (90 Base) MCG/ACT inhaler  Commonly known as: PROVENTIL HFA;VENTOLIN HFA;PROAIR HFA   2 puffs, Inhalation, Every 4 Hours PRN      BASAGLAR KWIKPEN 100 UNIT/ML injection pen   40 Units, Subcutaneous, Nightly      carvedilol 25 MG tablet  Commonly known as: COREG   25 mg, Oral, 2 Times Daily With Meals      cloNIDine 0.3 MG tablet  Commonly known as: CATAPRES   0.3 mg, Oral, Every 8 Hours Scheduled      DULoxetine 60 MG capsule  Commonly known as: CYMBALTA   60 mg, Oral, Daily      famotidine 20 MG tablet  Commonly known as: PEPCID   20 mg, Oral, 2 Times Daily      hydrALAZINE 50 MG tablet  Commonly known as: APRESOLINE   75 mg, Oral, 3 Times Daily      insulin aspart 100 UNIT/ML injection  Commonly known as: novoLOG   10 Units, Subcutaneous, 3 Times Daily Before Meals      levothyroxine 50 MCG tablet  Commonly known as: SYNTHROID, LEVOTHROID   50 mcg, Oral, Daily      lisinopril 20 MG tablet  Commonly known as: PRINIVIL,ZESTRIL   20 mg, Oral, 2 Times Daily      nicotine 21 MG/24HR patch  Commonly known as: NICODERM CQ   1 patch, Transdermal, Every 24 Hours      NIFEdipine XL 60 MG 24 hr tablet  Commonly known as: PROCARDIA XL   60 mg, Oral, 2 times daily      rOPINIRole 4 MG tablet  Commonly known as: REQUIP   4 mg, Oral, Nightly      simvastatin 20 MG tablet  Commonly known as: ZOCOR   40 mg, Oral, Daily         Stop These Medications    gabapentin 300 MG capsule  Commonly known as: NEURONTIN     lactulose 10 GM/15ML solution  Commonly known as: CHRONULAC            Discharge Diet:   Diet Instructions     Diet: Consistent Carbohydrate, Renal      Discharge Diet:  Consistent Carbohydrate  Renal               Activity at Discharge:   Activity Instructions     Gradually Increase Activity Until at  Pre-Hospitalization Level      Measure Blood Pressure            Follow-up Appointments:  PCP within 24 to 48 hours at skilled nursing facility.  Recommend facilitation of care with then this regarding tooth ache.  Keep dialysis appointment  Nephrology per their recommendation  Test Results Pending at Discharge:      Electronically signed by Harrison Moya MD, 10/12/2021, 16:36 CDT.    Time:> 30 mins      Part of this note may be an electronic transcription/translation of spoken language to printed text using the Dragon Dictation System.

## 2021-10-12 NOTE — PLAN OF CARE
Goal Outcome Evaluation:  Plan of Care Reviewed With: patient        Progress: improving  Outcome Summary: OT tx completed. Physician in  upon arrival discussing dc plans with pt. pt plans to dc to [facility] today for cont rehab. Pt able to TBD I'ly with CGA for stand only. She was able to manage all fine motor coordination tasks needed for dressing with no cues needed. pt sit to stand and ambulates to the whiting and back CGA. She is anxious to dc and begins to pack all of her personal belongings into her bag. Dc to [facility] today.

## 2021-10-12 NOTE — PROGRESS NOTES
Nephrology ([facility]) Progress Note      Patient:  Ena Upton  YOB: 1970  Date of Service: 10/12/2021  MRN: [id number]   Acct: [id number]   Primary Care Physician: Yaron Wiggins APRN  Advance Directive:   Code Status and Medical Interventions:   Ordered at: 10/05/21 1107     Level Of Support Discussed With:    Next of Kin (If No Surrogate)     Code Status:    No CPR     Medical Interventions (Level of Support Prior to Arrest):    Full     Comments:    sister-Pat     Admit Date: 10/4/2021       Hospital Day: 7  Referring Provider: No ref. provider found      Patient personally seen and examined.  Complete chart including Consults, Notes, Operative Reports, Labs, Cardiology, and Radiology studies reviewed as able.        Subjective:  Ena Upton is a 51 y.o. female  whom we were consulted for end stage renal disease. Maintenance hemodialysis Tuesday Thursday Saturday at [facility]. Unknown last time she went to HD.  History of type 2 diabetes, hypertension, hypothyroidism.  Frequent hospitalizations due to noncompliance with dialysis and her other medical treatments. Was just discharged from [facility] a few weeks ago.  Presented this time with altered mental status, initial blood glucose was >1500. No other history of recent events is known. Hospital course remarkable for improving mental status with correction of hyperglycemia. Tolerating HD well since admission. Moved to medical floor.    Today is awake, alert. Seen during dialysis. No new overnight issues   Dialysis   Pt was seen on RRT. Tolerating well  Modality: Hemodialysis  Access: Arterial Venous Fistula  Location: left upper  QB: 450  QD: 700  UF: 2000    Allergies:  Penicillins, Lamisil [terbinafine], Reglan [metoclopramide], and Stadol [butorphanol]    Home Meds:  Medications Prior to Admission   Medication Sig Dispense Refill Last Dose   • famotidine (PEPCID) 20 MG tablet Take 20 mg by  mouth 2 (Two) Times a Day.      • gabapentin (NEURONTIN) 300 MG capsule Take 300 mg by mouth 3 (Three) Times a Day.      • isosorbide mononitrate (IMDUR) 120 MG 24 hr tablet Take 120 mg by mouth Daily.      • lisinopril (PRINIVIL,ZESTRIL) 20 MG tablet Take 20 mg by mouth 2 (Two) Times a Day.      • nicotine (NICODERM CQ) 21 MG/24HR patch Place 1 patch on the skin as directed by provider Daily.      • albuterol (PROVENTIL HFA;VENTOLIN HFA) 108 (90 BASE) MCG/ACT inhaler Inhale 2 puffs Every 4 (Four) Hours As Needed for wheezing.   Unknown at Unknown time   • carvedilol (COREG) 25 MG tablet Take 1 tablet by mouth 2 (Two) Times a Day With Meals. 60 tablet 3    • cloNIDine (CATAPRES) 0.3 MG tablet Take 1 tablet by mouth Every 8 (Eight) Hours. 90 tablet 3    • DULoxetine (CYMBALTA) 60 MG capsule Take 60 mg by mouth daily.      • hydrALAZINE (APRESOLINE) 50 MG tablet Take 1.5 tablets by mouth 3 (Three) Times a Day. 120 tablet 3    • insulin aspart (novoLOG) 100 UNIT/ML injection Inject 10 Units under the skin into the appropriate area as directed 3 (Three) Times a Day Before Meals.      • Insulin Glargine (BASAGLAR KWIKPEN) 100 UNIT/ML injection pen Inject 40 Units under the skin into the appropriate area as directed Every Night.      • lactulose (CHRONULAC) 10 GM/15ML solution Take 20 g by mouth 3 (Three) Times a Day.      • levothyroxine (SYNTHROID, LEVOTHROID) 50 MCG tablet Take 1 tablet by mouth Daily. 30 tablet 3    • NIFEdipine XL (PROCARDIA XL) 60 MG 24 hr tablet Take 60 mg by mouth 2 (two) times a day.      • rOPINIRole (REQUIP) 4 MG tablet Take 4 mg by mouth Every Night.      • simvastatin (ZOCOR) 20 MG tablet Take 40 mg by mouth Daily.          Medicines:  Current Facility-Administered Medications   Medication Dose Route Frequency Provider Last Rate Last Admin   • acetaminophen (TYLENOL) tablet 650 mg  650 mg Oral Q6H PRN Harrison Moya MD   650 mg at 10/11/21 1247   • carvedilol (COREG) tablet 25 mg   25 mg Oral BID With Meals Adán Merrill DO   25 mg at 10/12/21 0756   • cloNIDine (CATAPRES) tablet 0.3 mg  0.3 mg Oral Q8H Adán Merrill DO   0.3 mg at 10/12/21 0534   • dextrose (D50W) 25 g/ 50mL Intravenous Solution 12.5 g  12.5 g Intravenous PRN Adán Merrill DO   12.5 g at 10/05/21 2314   • dextrose (D50W) 25 g/ 50mL Intravenous Solution 25 g  25 g Intravenous Q15 Min PRN Adán Merrill DO       • dextrose (GLUTOSE) oral gel 15 g  15 g Oral Q15 Min PRN Adán Merrill DO       • enoxaparin (LOVENOX) syringe 30 mg  30 mg Subcutaneous Nightly Adán Merrill DO   30 mg at 10/11/21 2131   • famotidine (PEPCID) tablet 20 mg  20 mg Oral Daily Adán Merrill DO   20 mg at 10/12/21 0804   • glucagon (human recombinant) (GLUCAGEN DIAGNOSTIC) injection 1 mg  1 mg Subcutaneous Q15 Min PRN Adán Merrill DO       • hydrALAZINE (APRESOLINE) tablet 75 mg  75 mg Oral Q8H Adán Merrill DO   75 mg at 10/12/21 0534   • insulin detemir (LEVEMIR) injection 10 Units  10 Units Subcutaneous Nightly Adán Merrill DO   10 Units at 10/11/21 2134   • insulin lispro (humaLOG) injection 0-9 Units  0-9 Units Subcutaneous TID AC Adán Merrill DO   4 Units at 10/12/21 0755   • ipratropium-albuterol (DUO-NEB) nebulizer solution 3 mL  3 mL Nebulization Q6H PRN Adán Merrill DO       • isosorbide mononitrate (IMDUR) 24 hr tablet 60 mg  60 mg Oral Q24H Adán Merrill DO   60 mg at 10/12/21 0804   • labetalol (NORMODYNE,TRANDATE) injection 20 mg  20 mg Intravenous Q6H PRN Adán Merrill DO   20 mg at 10/09/21 1404   • levothyroxine (SYNTHROID, LEVOTHROID) tablet 50 mcg  50 mcg Oral Q AM Adán Merrill DO   50 mcg at 10/12/21 0534   • lisinopril (PRINIVIL,ZESTRIL) tablet 20 mg  20 mg Oral BID Adán Merrill DO   20 mg at 10/12/21 0804   • NIFEdipine XL (PROCARDIA XL) 24 hr tablet 60 mg  60 mg Oral Q24H Adán Merrill DO   60 mg at 10/12/21 0804   • ondansetron (ZOFRAN) injection 4 mg  4 mg  Intravenous Q6H PRN Adán Merrill, DO       • sodium chloride 0.9 % bolus 100 mL  100 mL Intravenous PRN Eron Blood MD       • sodium chloride 0.9 % flush 10 mL  10 mL Intravenous PRN Adán Merrill, DO       • sodium chloride 0.9 % flush 10 mL  10 mL Intravenous Once PRN Adán Merrill, DO       • sodium chloride 0.9 % flush 10 mL  10 mL Intravenous Q12H Adán Merrill DO   10 mL at 10/12/21 0804   • sodium chloride 0.9 % flush 10 mL  10 mL Intravenous PRN Adán Merrill, DO       • sodium chloride 0.9 % flush 3 mL  3 mL Intravenous Q12H Adán Merrill DO   3 mL at 10/12/21 0804   • sodium chloride 0.9 % infusion  10 mL/hr Intravenous Continuous PRN Adán Merrill DO       • spironolactone (ALDACTONE) tablet 25 mg  25 mg Oral Daily Adán Merrill DO   25 mg at 10/12/21 0804       Past Medical History:  Past Medical History:   Diagnosis Date   • Anxiety    • Arthritis    • Chronic kidney disease     STAGE V RENAL FAILURE   • Depression    • Diabetes mellitus (HCC)    • HTN (hypertension)    • Hypothyroidism    • Obesity    • Tremors of nervous system        Past Surgical History:  Past Surgical History:   Procedure Laterality Date   • ARTERIOVENOUS FISTULA     • BREAST BIOPSY     • CARPAL TUNNEL RELEASE     • CATH LAB PROCEDURE     • CHOLECYSTECTOMY     • TUBAL ABDOMINAL LIGATION         Family History  Family History   Problem Relation Age of Onset   • Cancer Other    • Hypertension Other    • Kidney disease Other    • Heart disease Other    • Diabetes Other    • Diabetes Mother    • Diabetes Maternal Aunt    • Diabetes Maternal Grandmother        Social History  Social History     Socioeconomic History   • Marital status:    Tobacco Use   • Smoking status: Current Every Day Smoker     Packs/day: 0.50   • Smokeless tobacco: Never Used   • Tobacco comment: CUTTING BACK AND TRYING TO STOP SMOKING   Substance and Sexual Activity   • Alcohol use: No   • Drug use: No   • Sexual  activity: Defer         Review of Systems:  History obtained from chart review and the patient  General ROS: No fever or chills  Respiratory ROS: No cough, shortness of breath, wheezing  Cardiovascular ROS: No chest pain or palpitations  Gastrointestinal ROS: No abdominal pain or melena  Genito-Urinary ROS: No dysuria or hematuria  Psych ROS: No anxiety and depression    Objective:  Patient Vitals for the past 24 hrs:   BP Temp Temp src Pulse Resp SpO2 Weight   10/12/21 0806 138/68 98.3 °F (36.8 °C) Oral 59 18 92 % --   10/12/21 0447 133/57 98.3 °F (36.8 °C) Oral 61 18 92 % 81.1 kg (178 lb 14.4 oz)   10/12/21 0254 153/54 98.3 °F (36.8 °C) Oral 64 18 99 % --   10/11/21 1922 152/73 97.9 °F (36.6 °C) Oral 61 18 99 % --   10/11/21 1700 138/66 -- -- -- -- -- --   10/11/21 1226 170/75 97.7 °F (36.5 °C) -- 67 18 99 % --     No intake or output data in the 24 hours ending 10/12/21 1043  General: awake/alert   Chest:  clear to auscultation bilaterally without respiratory distress  CVS: regular rate and rhythm  Abdominal: soft, nontender, positive bowel sounds  Extremities: no cyanosis or edema  Skin: warm and dry without rash  Neuro: no focal motor deficits    Labs:  Results from last 7 days   Lab Units 10/10/21  0249 10/09/21  0317 10/08/21  0221   WBC 10*3/mm3 3.95 3.65 4.02   HEMOGLOBIN g/dL 8.6* 8.5* 9.1*   HEMATOCRIT % 28.3* 28.0* 28.7*   PLATELETS 10*3/mm3 120* 103* 91*         Results from last 7 days   Lab Units 10/10/21  0249 10/09/21  0317 10/08/21  1125   SODIUM mmol/L 133* 129* 133*   POTASSIUM mmol/L 4.1 3.8 3.8   CHLORIDE mmol/L 96* 94* 96*   CO2 mmol/L 27.0 24.0 24.0   BUN mg/dL 17 23* 18   CREATININE mg/dL 3.48* 4.62* 3.52*   CALCIUM mg/dL 8.3* 8.3* 8.4*   BILIRUBIN mg/dL  --   --  0.4   ALK PHOS U/L  --   --  99   ALT (SGPT) U/L  --   --  6   AST (SGOT) U/L  --   --  17   GLUCOSE mg/dL 190* 182* 273*       Radiology:   Imaging Results (Last 72 Hours)     ** No results found for the last 72 hours. **           Culture:  Blood Culture   Date Value Ref Range Status   10/05/2021 No growth at 24 hours  Preliminary   10/04/2021 No growth at 24 hours  Preliminary         Assessment   1.  End stage renal disease on HD TTS  2.  Type 2 diabetes  3.  Hypertension  4.  Hyperosmolar hyperglycemic state--resolved  5.  Hyponatremia due to volume overload--resolved  6.  Metabolic acidosis--improved  7.  Metabolic encephalopathy--resolved  8.  Anemia of CKD    Plan:  1.  Dialysis today  2.  BP better today, continue adjusting HTN meds PRN  3.  Monitor labs      Delmar Salazar, APRN  10/12/2021  10:43 CDT

## 2021-10-13 NOTE — THERAPY DISCHARGE NOTE
Acute Care - Physical Therapy Discharge Summary  [facility]       Patient Name: Ena Upton  : 1970  MRN: [id number]    Today's Date: 10/13/2021                 Admit Date: 10/4/2021      PT Recommendation and Plan    Visit Dx:    ICD-10-CM ICD-9-CM   1. Hyperosmolar hyperglycemic state (HHS) (HCC)  E11.00 250.22    E11.65    2. Altered mental status, unspecified altered mental status type  R41.82 780.97   3. Pneumonia due to infectious organism, unspecified laterality, unspecified part of lung  J18.9 486   4. Dysphagia, unspecified type  R13.10 787.20   5. Cognitive changes  R41.89 799.59   6. Decreased activities of daily living (ADL)  Z78.9 V49.89   7. Gait abnormality  R26.9 781.2   8. Diabetes mellitus due to underlying condition, uncontrolled, with hyperglycemia (HCC)  E08.65 249.81     790.29                PT Rehab Goals     Row Name 10/13/21 1346             Bed Mobility Goal 1 (PT)    Activity/Assistive Device (Bed Mobility Goal 1, PT) rolling to left; rolling to right; bridging; scooting; sit to supine/supine to sit; sidelying to sit/sit to sidelying  -AB      [place] Level/Cues Needed (Bed Mobility Goal 1, PT) independent  -AB      Time Frame (Bed Mobility Goal 1, PT) long term goal (LTG); 10 days  -AB      Progress/Outcomes (Bed Mobility Goal 1, PT) goal partially met  -AB              Transfer Goal 1 (PT)    Activity/Assistive Device (Transfer Goal 1, PT) sit-to-stand/stand-to-sit; bed-to-chair/chair-to-bed  -AB      [place] Level/Cues Needed (Transfer Goal 1, PT) standby assist  -AB      Time Frame (Transfer Goal 1, PT) long term goal (LTG); 10 days  -AB      Progress/Outcome (Transfer Goal 1, PT) goal partially met  -AB              Gait Training Goal 1 (PT)    Activity/Assistive Device (Gait Training Goal 1, PT) gait (walking locomotion); assistive device use; decrease fall risk; diminish gait deviation; improve balance and speed; increase endurance/gait distance; walker,  rolling  -AB      [place] Level (Gait Training Goal 1, PT) standby assist  -AB      Distance (Gait Training Goal 1, PT) 100 ft w/ less than or equal to 1 standing rest  -AB      Time Frame (Gait Training Goal 1, PT) long term goal (LTG); 10 days  -AB      Progress/Outcome (Gait Training Goal 1, PT) goal not met  -AB              Stairs Goal 1 (PT)    Activity/Assistive Device (Stairs Goal 1, PT) ascending stairs; descending stairs; using handrail, left; using handrail, right; step-to-step; decrease fall risk; improve balance and speed  -AB      [place] Level/Cues Needed (Stairs Goal 1, PT) contact guard assist  -AB      Number of Stairs (Stairs Goal 1, PT) 4  -AB      Time Frame (Stairs Goal 1, PT) long term goal (LTG); 10 days  -AB      Progress/Outcome (Stairs Goal 1, PT) goal not met  -AB            User Key  (r) = Recorded By, (t) = Taken By, (c) = Cosigned By    Initials Name Provider Type Discipline    Kalee Anthony PTA Physical Therapy Assistant PT                    PT Discharge Summary  Anticipated Discharge Disposition (PT): other (see comments)  Reason for Discharge: Discharge from facility  Outcomes Achieved: Refer to plan of care for updates on goals achieved  Discharge Destination: SNF      Kalee Bethea PTA   10/13/2021

## 2021-10-13 NOTE — PAYOR COMM NOTE
"DC TO SNF  10-12-21  [id number]    Ena Upton (51 y.o. Female)             Date of Birth Social Security Number Address Home Phone MRN    [id number]  [id number] [facility] [address] [phone] [id number]    Christianity Marital Status             Islam        Admission Date Admission Type Admitting Provider Attending Provider Department, Room/Bed    10/4/21 Emergency Harrison Moya MD  [facility] 3C, 361/1    Discharge Date Discharge Disposition Discharge Destination          10/12/2021 Skilled Nursing Facility (DC - External)              Attending Provider: (none)   Allergies: Penicillins, Lamisil [Terbinafine], Reglan [Metoclopramide], Stadol [Butorphanol]    Isolation: None   Infection: None   Code Status: Prior   Advance Care Planning Activity    Ht: 158 cm (62.21\")   Wt: 81.1 kg (178 lb 14.4 oz)    Admission Cmt: None   Principal Problem: Hyperosmolar hyperglycemic state (HHS) (HCC) [E11.00,E11.65]                 Active Insurance as of 10/4/2021     Primary Coverage     Payor Plan Insurance Group Employer/Plan Group    [employer] [place] MEDICARE [place]PLACEMENT WELL[employer] MEDICARE REPLACEMENT      Payor Plan Address Payor Plan Phone Number Payor Plan Fax Number Effective Dates    [address] [address] [phone]  6/1/2021 - None Entered    [facility] [id number]       Subscriber Name Subscriber Birth Date Member ID       ENA UPTON 1970 [id number]           Secondary Coverage     Payor Plan Insurance Group Employer/Plan Group    [place]      Payor Plan Address Payor Plan Phone Number Payor Plan Fax Number Effective Dates    [address] [phone]  8/17/2021 - None Entered    [facility] [id number]       Subscriber Name Subscriber Birth Date Member ID       ENA UPTON 1970 [id number]                 Emergency Contacts      (Rel.) Home Phone Work Phone Mobile Phone    Kassandra Paez (Sister) [phone] -- [phone]    Conn, " Dmitriy (Friend) -- -- [phone]               Discharge Summary      Harrison Moya MD at 10/12/21 0236              [facility]  DISCHARGE SUMMARY       Date of Admission: 10/4/2021  Date of Discharge:  10/12/2021  Primary Care Physician: Yaron Wiggins, GAEL    Discharge Diagnoses:  Active Hospital Problems    Diagnosis    • **Hyperosmolar hyperglycemic state (HHS) (HCC)    • Hyponatremia    • Uremic encephalopathy syndrome    • Hypertensive emergency    • Hypothyroidism    • End stage kidney disease (HCC)          Presenting Problem/History of Present Illness:  Hyperosmolar hyperglycemic state (HHS) (HCC) [E11.00, E11.65]  Hyperosmolar hyperglycemic state (HHS) (HCC) [E11.00, E11.65]         Hospital Course  She is 51-year-old woman admitted for altered mental status and was managed for hyperosmolar hyperglycemic syndrome.  She carries history of end-stage renal disease on dialysis every Tuesday Thursday and Saturday through her left AV fistula.  She is diabetic and has hypertension.  She is on multiple antihypertensive medications to address her blood pressure.  Encephalopathy is now resolved.  Suspect that this is related to hyperglycemic hyperosmolar state and missed dialysis.  Patient also had positive hCG which was worked up including CT of the abdomen, and pelvic ultrasound.  Overall findings reportedly not consistent with pregnancy or tomorrow.  The interested reader is referred to the report for details  Patient told me that she hasn't had sex for 6 years and had uterine ablation.    Patient received dialysis as per direction by nephrology.    Patient is felt to be at her best condition and medically appropriate for transfer to skilled nursing facility.    There are medications were not continued on admission likely because of altered mental status.  I will defer to primary care provider resumption as necessary of some of the other  medications so it can be closely monitored for its effect.        Procedures Performed:   None    Consults:   Dr. Avitia    Pertinent Test Results:   Lab Results (last 7 days)     Procedure Component Value Units Date/Time    COVID PRE-OP / PRE-PROCEDURE SCREENING ORDER (NO ISOLATION) - Swab, Nasal Cavity [[id number]]  (Normal) Collected: 10/12/21 1355    Specimen: Swab from Nasal Cavity Updated: 10/12/21 1447    Narrative:      The following orders were created for panel order COVID PRE-OP / PRE-PROCEDURE SCREENING ORDER (NO ISOLATION) - Swab, Nasal Cavity.  Procedure                               Abnormality         Status                     ---------                               -----------         ------                     COVID-19,[facility] IN-RONNIE...[[id number]]  Normal              Final result                 Please view results for these tests on the individual orders.    COVID-19,[facility] IN-HOUSE,Nasal Swab No Transport Media 3-4 HR TAT - Swab, Nasal Cavity [[id number]]  (Normal) Collected: 10/12/21 1355    Specimen: Swab from Nasal Cavity Updated: 10/12/21 1447     COVID19 Not Detected    Narrative:      Fact sheet for providers: https://www.fda.gov/media/688232/download     Fact sheet for patients: https://www.fda.gov/media/872037/download    Test performed by PCR.    Consider negative results in combination with clinical observations, patient history, and epidemiological information.  Fact sheet for providers: https://www.fda.gov/media/098862/download     Fact sheet for patients: https://www.fda.gov/media/210695/download    Test performed by PCR.    Consider negative results in combination with clinical observations, patient history, and epidemiological information.    POC Glucose Once [[id number]]  (Abnormal) Collected: 10/12/21 0735    Specimen: Blood Updated: 10/12/21 0747     Glucose 217 mg/dL      Comment: : [id number] Cheryl FontaineMeter ID: [id number]       POC Glucose Once  [[id number]]  (Abnormal) Collected: 10/11/21 2132    Specimen: Blood Updated: 10/11/21 2153     Glucose 330 mg/dL      Comment: : [id number] Jaja Camacho ID: [id number]       POC Glucose Once [[id number]]  (Abnormal) Collected: 10/11/21 1633    Specimen: Blood Updated: 10/11/21 1645     Glucose 249 mg/dL      Comment: : [id number] Leonidsusannah Mendez LeonidesiseMeter ID: [id number]       POC Glucose Once [[id number]]  (Abnormal) Collected: 10/11/21 1120    Specimen: Blood Updated: 10/11/21 1131     Glucose 223 mg/dL      Comment: : [id number] Leonidsusnanah Mendez LeonidesiseMeter ID: [id number]       POC Glucose Once [[id number]]  (Abnormal) Collected: 10/11/21 0726    Specimen: Blood Updated: 10/11/21 0737     Glucose 236 mg/dL      Comment: : [id number] Leonidsusannah Mendez LeonidesiseMeter ID: [id number]       POC Glucose Once [[id number]]  (Abnormal) Collected: 10/10/21 2120    Specimen: Blood Updated: 10/10/21 2141     Glucose 222 mg/dL      Comment: : [id number] Lara TariqMeter ID: [id number]       POC Glucose Once [[id number]]  (Abnormal) Collected: 10/10/21 1638    Specimen: Blood Updated: 10/10/21 1649     Glucose 234 mg/dL      Comment: : [id number] Gustavo Bourne  LMeter ID: [id number]       POC Glucose Once [[id number]]  (Abnormal) Collected: 10/10/21 1206    Specimen: Blood Updated: 10/10/21 1217     Glucose 157 mg/dL      Comment: : [id number] Andrea Calle ID: [id number]       POC Glucose Once [[id number]]  (Abnormal) Collected: 10/10/21 0628    Specimen: Blood Updated: 10/10/21 0640     Glucose 194 mg/dL      Comment: : [id number] Shannon StockMeter ID: [id number]       Basic Metabolic Panel [[id number]]  (Abnormal) Collected: 10/10/21 0249    Specimen: Blood Updated: 10/10/21 0332     Glucose 190 mg/dL      BUN 17 mg/dL      Creatinine 3.48 mg/dL      Sodium 133 mmol/L      Potassium 4.1 mmol/L      Chloride 96 mmol/L      CO2 27.0 mmol/L      Calcium 8.3 mg/dL      eGFR   Amer --      Comment: <15 Indicative of kidney failure.        eGFR Non African Amer 14 mL/min/1.73      Comment: <15 Indicative of kidney failure.        BUN/Creatinine Ratio 4.9     Anion Gap 10.0 mmol/L     Narrative:      GFR Normal >60  Chronic Kidney Disease <60  Kidney Failure <15      CBC (No Diff) [[id number]]  (Abnormal) Collected: 10/10/21 0249    Specimen: Blood Updated: 10/10/21 0315     WBC 3.95 10*3/mm3      RBC 2.95 10*6/mm3      Hemoglobin 8.6 g/dL      Hematocrit 28.3 %      MCV 95.9 fL      MCH 29.2 pg      MCHC 30.4 g/dL      RDW 15.6 %      RDW-SD 54.5 fl      MPV 10.0 fL      Platelets 120 10*3/mm3     Blood Culture - Blood, Arm, Right [[id number]]  (Normal) Collected: 10/04/21 2344    Specimen: Blood from Arm, Right Updated: 10/10/21 0016     Blood Culture No growth at 5 days    Narrative:      The previously reported component GRAM STAIN is no longer being reported. Previous result was <null> (Reference Range: [Reference Range]) on 10/9/2021 at 0015 CDT.    Blood Culture - Blood, Arm, Right [[id number]]  (Normal) Collected: 10/05/21 0003    Specimen: Blood from Arm, Right Updated: 10/10/21 0016     Blood Culture No growth at 5 days    Narrative:      The previously reported component GRAM STAIN is no longer being reported. Previous result was <null> (Reference Range: [Reference Range]) on 10/9/2021 at 0015 CDT.    POC Glucose Once [[id number]]  (Abnormal) Collected: 10/09/21 2025    Specimen: Blood Updated: 10/09/21 2036     Glucose 261 mg/dL      Comment: : [id number] Shannon StockAnabelle ID: [id number]       POC Glucose Once [[id number]]  (Abnormal) Collected: 10/09/21 1803    Specimen: Blood Updated: 10/09/21 1816     Glucose 331 mg/dL      Comment: : [id number] Andreaverónica PalaciosGianfranco ID: [id number]       POC Glucose Once [[id number]]  (Normal) Collected: 10/09/21 0903    Specimen: Blood Updated: 10/09/21 0915     Glucose 106 mg/dL      Comment: : [id number] Andrea Calle ID: [id number]       POC  Glucose Once [[id number]]  (Abnormal) Collected: 10/09/21 0626    Specimen: Blood Updated: 10/09/21 0637     Glucose 157 mg/dL      Comment: : [id number] Shannon StockMeter ID: [id number]       Basic Metabolic Panel [[id number]]  (Abnormal) Collected: 10/09/21 0317    Specimen: Blood Updated: 10/09/21 0442     Glucose 182 mg/dL      BUN 23 mg/dL      Creatinine 4.62 mg/dL      Sodium 129 mmol/L      Potassium 3.8 mmol/L      Chloride 94 mmol/L      CO2 24.0 mmol/L      Calcium 8.3 mg/dL      eGFR   Amer --     Comment: <15 Indicative of kidney failure.        eGFR Non African Amer 10 mL/min/1.73      Comment: <15 Indicative of kidney failure.        BUN/Creatinine Ratio 5.0     Anion Gap 11.0 mmol/L     Narrative:      GFR Normal >60  Chronic Kidney Disease <60  Kidney Failure <15      CBC (No Diff) [[id number]]  (Abnormal) Collected: 10/09/21 0317    Specimen: Blood Updated: 10/09/21 0420     WBC 3.65 10*3/mm3      RBC 2.96 10*6/mm3      Hemoglobin 8.5 g/dL      Hematocrit 28.0 %      MCV 94.6 fL      MCH 28.7 pg      MCHC 30.4 g/dL      RDW 15.9 %      RDW-SD 55.3 fl      MPV 10.8 fL      Platelets 103 10*3/mm3     POC Glucose Once [[id number]]  (Abnormal) Collected: 10/08/21 2123    Specimen: Blood Updated: 10/08/21 2135     Glucose 240 mg/dL      Comment: : [id number] Shannon StockMeter ID: [id number]       POC Glucose Once [[id number]]  (Abnormal) Collected: 10/08/21 1759    Specimen: Blood Updated: 10/08/21 1810     Glucose 215 mg/dL      Comment: : [id number] Kiki Ghada Nikki ID: [id number]       T4, Free [[id number]]  (Abnormal) Collected: 10/08/21 1125    Specimen: Blood Updated: 10/08/21 1204     Free T4 0.90 ng/dL     Narrative:      Results may be falsely increased if patient taking Biotin.      TSH [[id number]]  (Abnormal) Collected: 10/08/21 1125    Specimen: Blood Updated: 10/08/21 1203     TSH 31.560 uIU/mL     POC Glucose Once [[id number]]  (Abnormal) Collected: 10/08/21 1152     Specimen: Blood Updated: 10/08/21 1202     Glucose 262 mg/dL      Comment: : [id number] Kiki Jordan ID: [id number]       Comprehensive Metabolic Panel [[id number]]  (Abnormal) Collected: 10/08/21 1125    Specimen: Blood Updated: 10/08/21 1159     Glucose 273 mg/dL      BUN 18 mg/dL      Creatinine 3.52 mg/dL      Sodium 133 mmol/L      Potassium 3.8 mmol/L      Chloride 96 mmol/L      CO2 24.0 mmol/L      Calcium 8.4 mg/dL      Total Protein 5.7 g/dL      Albumin 3.30 g/dL      ALT (SGPT) 6 U/L      AST (SGOT) 17 U/L      Alkaline Phosphatase 99 U/L      Total Bilirubin 0.4 mg/dL      eGFR Non African Amer 14 mL/min/1.73      Comment: <15 Indicative of kidney failure.        eGFR   Amer --     Comment: <15 Indicative of kidney failure.        Globulin 2.4 gm/dL      A/G Ratio 1.4 g/dL      BUN/Creatinine Ratio 5.1     Anion Gap 13.0 mmol/L     Narrative:      GFR Normal >60  Chronic Kidney Disease <60  Kidney Failure <15      POC Glucose Once [[id number]]  (Abnormal) Collected: 10/08/21 0703    Specimen: Blood Updated: 10/08/21 0714     Glucose 131 mg/dL      Comment: : [id number] Gabriele Nieves ID: [id number]       CBC & Differential [[id number]]  (Abnormal) Collected: 10/08/21 0221    Specimen: Blood Updated: 10/08/21 0344    Narrative:      The following orders were created for panel order CBC & Differential.  Procedure                               Abnormality         Status                     ---------                               -----------         ------                     CBC Auto Differential[[id number]]        Abnormal            Final result                 Please view results for these tests on the individual orders.    CBC Auto Differential [[id number]]  (Abnormal) Collected: 10/08/21 0221    Specimen: Blood Updated: 10/08/21 0344     WBC 4.02 10*3/mm3      RBC 3.08 10*6/mm3      Hemoglobin 9.1 g/dL      Hematocrit 28.7 %      MCV 93.2 fL      MCH 29.5 pg      MCHC 31.7  g/dL      RDW 16.2 %      RDW-SD 55.4 fl      MPV 10.9 fL      Platelets 91 10*3/mm3      Neutrophil % 58.8 %      Lymphocyte % 23.1 %      Monocyte % 13.4 %      Eosinophil % 3.5 %      Basophil % 1.2 %      Immature Grans % 0.0 %      Neutrophils, Absolute 2.36 10*3/mm3      Lymphocytes, Absolute 0.93 10*3/mm3      Monocytes, Absolute 0.54 10*3/mm3      Eosinophils, Absolute 0.14 10*3/mm3      Basophils, Absolute 0.05 10*3/mm3      Immature Grans, Absolute 0.00 10*3/mm3      nRBC 0.0 /100 WBC     POC Glucose Once [[id number]]  (Abnormal) Collected: 10/07/21 2201    Specimen: Blood Updated: 10/07/21 2212     Glucose 234 mg/dL      Comment: : [id number] Gabriele BenjaminelMeter ID: [id number]       POC Glucose Once [[id number]]  (Abnormal) Collected: 10/07/21 1712    Specimen: Blood Updated: 10/07/21 1723     Glucose 185 mg/dL      Comment: : [id number] Kiki Melendezhanie Micronoteseter ID: [id number]       POC Glucose Once [[id number]]  (Normal) Collected: 10/07/21 1217    Specimen: Blood Updated: 10/07/21 1228     Glucose 91 mg/dL      Comment: : [id number] EndoEvolutionie Micronoteseter ID: [id number]       POC Glucose Once [[id number]]  (Normal) Collected: 10/07/21 0526    Specimen: Blood Updated: 10/07/21 0537     Glucose 72 mg/dL      Comment: : [id number] Gabriele AlexanderelMeter ID: [id number]       Basic Metabolic Panel [[id number]]  (Abnormal) Collected: 10/07/21 0327    Specimen: Blood Updated: 10/07/21 0509     Glucose 81 mg/dL      BUN 22 mg/dL      Creatinine 3.79 mg/dL      Sodium 127 mmol/L      Potassium 3.5 mmol/L      Chloride 95 mmol/L      CO2 22.0 mmol/L      Calcium 8.3 mg/dL      eGFR   Amer --     Comment: <15 Indicative of kidney failure.        eGFR Non African Amer 13 mL/min/1.73      Comment: <15 Indicative of kidney failure.        BUN/Creatinine Ratio 5.8     Anion Gap 10.0 mmol/L     Narrative:      GFR Normal >60  Chronic Kidney Disease <60  Kidney Failure <15      hCG, Quantitative, Pregnancy  [[id number]] Collected: 10/07/21 0327    Specimen: Blood Updated: 10/07/21 0455     HCG Quantitative 6.57 mIU/mL     Narrative:      HCG Ranges by Gestational Age    Females - non-pregnant premenopausal   </= 1mIU/mL HCG  Females - postmenopausal               </= 7mIU/mL HCG    3 Weeks         5.8 -    71.2 mIU/mL  4 Weeks         9.5 -     750 mIU/mL  5 Weeks         217 -   7,138 mIU/mL  6 Weeks         158 -  31,795 mIU/mL  7 Weeks       3,697 - 163,563 mIU/mL  8 Weeks      32,065 - 149,571 mIU/mL  9 Weeks      63,803 - 151,410 mIU/mL  10 Weeks     46,509 - 186,977 mIU/mL  12 Weeks     27,832 - 210,612 mIU/mL  14 Weeks     13,950 -  62,530 mIU/mL  15 Weeks     12,039 -  70,971 mIU/mL  16 Weeks      9,040 -  56,451 mIU/mL  17 Weeks      8,175 -  55,868 mIU/mL  18 Weeks      8,099 -  58,176 mIU/mL  Results may be falsely decreased if patient taking Biotin.      CBC & Differential [[id number]]  (Abnormal) Collected: 10/07/21 0327    Specimen: Blood Updated: 10/07/21 0435    Narrative:      The following orders were created for panel order CBC & Differential.  Procedure                               Abnormality         Status                     ---------                               -----------         ------                     CBC Auto Differential[[id number]]        Abnormal            Final result                 Please view results for these tests on the individual orders.    CBC Auto Differential [[id number]]  (Abnormal) Collected: 10/07/21 0327    Specimen: Blood Updated: 10/07/21 0435     WBC 6.06 10*3/mm3      RBC 3.26 10*6/mm3      Hemoglobin 9.5 g/dL      Hematocrit 29.2 %      MCV 89.6 fL      MCH 29.1 pg      MCHC 32.5 g/dL      RDW 15.9 %      RDW-SD 52.6 fl      MPV 11.1 fL      Platelets 128 10*3/mm3      Neutrophil % 67.2 %      Lymphocyte % 17.7 %      Monocyte % 10.6 %      Eosinophil % 3.1 %      Basophil % 1.2 %      Immature Grans % 0.2 %      Neutrophils, Absolute 4.08 10*3/mm3      Lymphocytes,  Absolute 1.07 10*3/mm3      Monocytes, Absolute 0.64 10*3/mm3      Eosinophils, Absolute 0.19 10*3/mm3      Basophils, Absolute 0.07 10*3/mm3      Immature Grans, Absolute 0.01 10*3/mm3      nRBC 0.0 /100 WBC     POC Glucose Once [[id number]]  (Normal) Collected: 10/06/21 2357    Specimen: Blood Updated: 10/07/21 0008     Glucose 123 mg/dL      Comment: : [id number] SrideviJeffbrien LUCASoskar ID: [id number]       POC Glucose Once [[id number]]  (Abnormal) Collected: 10/06/21 2011    Specimen: Blood Updated: 10/06/21 2022     Glucose 180 mg/dL      Comment: : [id number] Gabriele RajanMeter ID: [id number]       POC Glucose Once [[id number]]  (Abnormal) Collected: 10/06/21 1754    Specimen: Blood Updated: 10/06/21 1806     Glucose 188 mg/dL      Comment: : [id number] Yue Bolivar ID: [id number]       POC Glucose Once [[id number]]  (Normal) Collected: 10/06/21 1159    Specimen: Blood Updated: 10/06/21 1210     Glucose 117 mg/dL      Comment: : [id number] Yue Bolivar ID: [id number]       Urine Culture - Urine, Urine, Catheter [[id number]] Collected: 10/05/21 0031    Specimen: Urine, Catheter Updated: 10/06/21 1143     Urine Culture <10,000 CFU/mL Mixed Lilly Isolated    Narrative:      Specimen contains mixed organisms of questionable pathogenicity which indicates contamination with commensal lilly.  Further identification is unlikely to provide clinically useful information.  Suggest recollection.    POC Glucose Once [[id number]]  (Abnormal) Collected: 10/06/21 0520    Specimen: Blood Updated: 10/06/21 0531     Glucose 144 mg/dL      Comment: : [id number] Gabriele RajanMeter ID: [id number]       POC Glucose Once [[id number]]  (Normal) Collected: 10/06/21 0021    Specimen: Blood Updated: 10/06/21 0033     Glucose 109 mg/dL      Comment: : [id number] Gabriele RajanMeter ID: [id number]       Osmolality, Serum [[id number]]  (Abnormal) Collected: 10/05/21 2309    Specimen: Blood Updated: 10/05/21  2349     Osmolality 267 mOsm/kg     Basic Metabolic Panel [[id number]]  (Abnormal) Collected: 10/05/21 2309    Specimen: Blood Updated: 10/05/21 2340     Glucose 99 mg/dL      BUN 18 mg/dL      Creatinine 2.88 mg/dL      Sodium 128 mmol/L      Potassium 3.6 mmol/L      Chloride 93 mmol/L      CO2 23.0 mmol/L      Calcium 8.4 mg/dL      eGFR Non African Amer 17 mL/min/1.73      BUN/Creatinine Ratio 6.3     Anion Gap 12.0 mmol/L     Narrative:      GFR Normal >60  Chronic Kidney Disease <60  Kidney Failure <15      Phosphorus [[id number]]  (Abnormal) Collected: 10/05/21 2309    Specimen: Blood Updated: 10/05/21 2337     Phosphorus 2.1 mg/dL     Magnesium [[id number]]  (Normal) Collected: 10/05/21 2309    Specimen: Blood Updated: 10/05/21 2334     Magnesium 1.7 mg/dL     CBC & Differential [[id number]]  (Abnormal) Collected: 10/05/21 2309    Specimen: Blood Updated: 10/05/21 2329    Narrative:      The following orders were created for panel order CBC & Differential.  Procedure                               Abnormality         Status                     ---------                               -----------         ------                     CBC Auto Differential[[id number]]        Abnormal            Final result                 Please view results for these tests on the individual orders.    CBC Auto Differential [[id number]]  (Abnormal) Collected: 10/05/21 2309    Specimen: Blood Updated: 10/05/21 2329     WBC 10.03 10*3/mm3      RBC 3.62 10*6/mm3      Hemoglobin 10.6 g/dL      Hematocrit 30.5 %      MCV 84.3 fL      MCH 29.3 pg      MCHC 34.8 g/dL      RDW 14.9 %      RDW-SD 46.0 fl      MPV 10.9 fL      Platelets 125 10*3/mm3      Neutrophil % 75.8 %      Lymphocyte % 12.2 %      Monocyte % 9.4 %      Eosinophil % 1.6 %      Basophil % 0.6 %      Neutrophils, Absolute 7.61 10*3/mm3      Lymphocytes, Absolute 1.22 10*3/mm3      Monocytes, Absolute 0.94 10*3/mm3      Eosinophils, Absolute 0.16 10*3/mm3      Basophils,  Absolute 0.06 10*3/mm3     POC Glucose Once [[id number]]  (Normal) Collected: 10/05/21 2257    Specimen: Blood Updated: 10/05/21 2308     Glucose 97 mg/dL      Comment: : [id number] Gabriele AlexanderelMeter ID: [id number]       POC Glucose Once [[id number]]  (Normal) Collected: 10/05/21 2205    Specimen: Blood Updated: 10/05/21 2216     Glucose 88 mg/dL      Comment: : [id number] Gabriele DanielMeter ID: [id number]       POC Glucose Once [[id number]]  (Normal) Collected: 10/05/21 2118    Specimen: Blood Updated: 10/05/21 2130     Glucose 119 mg/dL      Comment: : [id number] Gabriele DanielMeter ID: [id number]       POC Glucose Once [[id number]]  (Normal) Collected: 10/05/21 2051    Specimen: Blood Updated: 10/05/21 2102     Glucose 84 mg/dL      Comment: : [id number] Gabriele BenjaminelMeter ID: [id number]       Basic Metabolic Panel [[id number]]  (Abnormal) Collected: 10/05/21 2029    Specimen: Blood Updated: 10/05/21 2054     Glucose 106 mg/dL      BUN 18 mg/dL      Creatinine 2.84 mg/dL      Sodium 129 mmol/L      Potassium 3.5 mmol/L      Chloride 93 mmol/L      CO2 23.0 mmol/L      Calcium 8.1 mg/dL      eGFR Non African Amer 17 mL/min/1.73      BUN/Creatinine Ratio 6.3     Anion Gap 13.0 mmol/L     Narrative:      GFR Normal >60  Chronic Kidney Disease <60  Kidney Failure <15      POC Glucose Once [[id number]]  (Normal) Collected: 10/05/21 1939    Specimen: Blood Updated: 10/05/21 1950     Glucose 116 mg/dL      Comment: : [id number] Gabriele DanielMeter ID: [id number]       Basic Metabolic Panel [[id number]]  (Abnormal) Collected: 10/05/21 1805    Specimen: Blood Updated: 10/05/21 1828     Glucose 179 mg/dL      BUN 17 mg/dL      Creatinine 2.62 mg/dL      Sodium 127 mmol/L      Potassium 3.5 mmol/L      Chloride 91 mmol/L      CO2 23.0 mmol/L      Calcium 8.2 mg/dL      eGFR Non African Amer 19 mL/min/1.73      BUN/Creatinine Ratio 6.5     Anion Gap 13.0 mmol/L     Narrative:      GFR Normal  ">60  Chronic Kidney Disease <60  Kidney Failure <15      POC Glucose Once [[id number]]  (Abnormal) Collected: 10/05/21 1804    Specimen: Blood Updated: 10/05/21 1815     Glucose 183 mg/dL      Comment: : [id number] Carballo AllysonMeter ID: [id number]       POC Glucose Once [[id number]]  (Abnormal) Collected: 10/05/21 1706    Specimen: Blood Updated: 10/05/21 1718     Glucose 201 mg/dL      Comment: : [id number] Carballo AllysonMeter ID: [id number]       POC Glucose Once [[id number]]  (Abnormal) Collected: 10/05/21 1553    Specimen: Blood Updated: 10/05/21 1604     Glucose 274 mg/dL      Comment: : [id number] Carballo AllysonMeter ID: [id number]             Condition on Discharge:   stable  Physical Exam on Discharge:  /66 (BP Location: Right arm)   Pulse 63   Temp 97.4 °F (36.3 °C)   Resp 18   Ht 158 cm (62.21\")   Wt 81.1 kg (178 lb 14.4 oz)   SpO2 100%   BMI 32.51 kg/m²   Physical Exam  Musculoskeletal:      Cervical back: Normal range of motion.       Seated comfortably on  Recliner  No distress  Interactive  Patient has no gross oral lesions however has poor oral hygiene and likely cavity on the molar right bottom  GEN: Awake, alert, interactive, in NAD  HEENT: Atraumatic, PERRLA, EOMI, Anicteric, Trachea midline  Lungs: CTAB, no wheezing/rales/rhonchi  Heart: RRR, +S1/s2, no rub + murmur  ABD: soft, nt/nd, +BS, no guarding/rebound  Extremities: atraumatic, no cyanosis, no edema  Skin: no rashes or lesions; av fistula left arm  Neuro: AAOx3, no focal deficits  Psych: normal mood & affect    Discharge Disposition:  Skilled Nursing Facility (DC - External)    Discharge Medications:     Discharge Medications      New Medications      Instructions Start Date   acetaminophen 325 MG tablet  Commonly known as: TYLENOL   650 mg, Oral, Every 6 Hours PRN      spironolactone 25 MG tablet  Commonly known as: ALDACTONE   25 mg, Oral, Daily   Start Date: October 13, 2021        Changes to Medications      " Instructions Start Date   isosorbide mononitrate 60 MG 24 hr tablet  Commonly known as: IMDUR  What changed:   · medication strength  · how much to take  · when to take this   60 mg, Oral, Every 24 Hours Scheduled   Start Date: October 13, 2021        Continue These Medications      Instructions Start Date   albuterol sulfate  (90 Base) MCG/ACT inhaler  Commonly known as: PROVENTIL HFA;VENTOLIN HFA;PROAIR HFA   2 puffs, Inhalation, Every 4 Hours PRN      BASAGLAR KWIKPEN 100 UNIT/ML injection pen   40 Units, Subcutaneous, Nightly      carvedilol 25 MG tablet  Commonly known as: COREG   25 mg, Oral, 2 Times Daily With Meals      cloNIDine 0.3 MG tablet  Commonly known as: CATAPRES   0.3 mg, Oral, Every 8 Hours Scheduled      DULoxetine 60 MG capsule  Commonly known as: CYMBALTA   60 mg, Oral, Daily      famotidine 20 MG tablet  Commonly known as: PEPCID   20 mg, Oral, 2 Times Daily      hydrALAZINE 50 MG tablet  Commonly known as: APRESOLINE   75 mg, Oral, 3 Times Daily      insulin aspart 100 UNIT/ML injection  Commonly known as: novoLOG   10 Units, Subcutaneous, 3 Times Daily Before Meals      levothyroxine 50 MCG tablet  Commonly known as: SYNTHROID, LEVOTHROID   50 mcg, Oral, Daily      lisinopril 20 MG tablet  Commonly known as: PRINIVIL,ZESTRIL   20 mg, Oral, 2 Times Daily      nicotine 21 MG/24HR patch  Commonly known as: NICODERM CQ   1 patch, Transdermal, Every 24 Hours      NIFEdipine XL 60 MG 24 hr tablet  Commonly known as: PROCARDIA XL   60 mg, Oral, 2 times daily      rOPINIRole 4 MG tablet  Commonly known as: REQUIP   4 mg, Oral, Nightly      simvastatin 20 MG tablet  Commonly known as: ZOCOR   40 mg, Oral, Daily         Stop These Medications    gabapentin 300 MG capsule  Commonly known as: NEURONTIN     lactulose 10 GM/15ML solution  Commonly known as: CHRONULAC            Discharge Diet:   Diet Instructions     Diet: Consistent Carbohydrate, Renal      Discharge Diet:  Consistent  Carbohydrate  Renal               Activity at Discharge:   Activity Instructions     Gradually Increase Activity Until at Pre-Hospitalization Level      Measure Blood Pressure            Follow-up Appointments:  PCP within 24 to 48 hours at skilled nursing facility.  Recommend facilitation of care with then this regarding tooth ache.  Keep dialysis appointment  Nephrology per their recommendation  Test Results Pending at Discharge:      Electronically signed by Harrison Moya MD, 10/12/2021, 16:36 CDT.    Time:> 30 mins      Part of this note may be an electronic transcription/translation of spoken language to printed text using the Dragon Dictation System.            Electronically signed by Harrison Moya MD at 10/12/21 7502

## 2021-11-18 ENCOUNTER — HOSPITAL ENCOUNTER (INPATIENT)
Age: 51
LOS: 7 days | Discharge: HOME HEALTH | DRG: 304 | End: 2021-11-25
Attending: EMERGENCY MEDICINE | Admitting: INTERNAL MEDICINE
Payer: MEDICARE

## 2021-11-18 ENCOUNTER — APPOINTMENT (OUTPATIENT)
Dept: CT IMAGING | Age: 51
DRG: 304 | End: 2021-11-18
Payer: MEDICARE

## 2021-11-18 ENCOUNTER — APPOINTMENT (OUTPATIENT)
Dept: GENERAL RADIOLOGY | Age: 51
DRG: 304 | End: 2021-11-18
Payer: MEDICARE

## 2021-11-18 DIAGNOSIS — J96.01 ACUTE RESPIRATORY FAILURE WITH HYPOXIA (HCC): ICD-10-CM

## 2021-11-18 DIAGNOSIS — E87.79 OTHER HYPERVOLEMIA: ICD-10-CM

## 2021-11-18 DIAGNOSIS — E87.5 HYPERKALEMIA: ICD-10-CM

## 2021-11-18 DIAGNOSIS — N18.6 ESRD (END STAGE RENAL DISEASE) ON DIALYSIS (HCC): ICD-10-CM

## 2021-11-18 DIAGNOSIS — I16.0 HYPERTENSIVE URGENCY: ICD-10-CM

## 2021-11-18 DIAGNOSIS — R41.82 ALTERED MENTAL STATUS, UNSPECIFIED ALTERED MENTAL STATUS TYPE: Primary | ICD-10-CM

## 2021-11-18 DIAGNOSIS — Z99.2 ESRD (END STAGE RENAL DISEASE) ON DIALYSIS (HCC): ICD-10-CM

## 2021-11-18 LAB
ALBUMIN SERPL-MCNC: 3.9 G/DL (ref 3.5–5.2)
ALP BLD-CCNC: 151 U/L (ref 35–104)
ALT SERPL-CCNC: 12 U/L (ref 5–33)
ANION GAP SERPL CALCULATED.3IONS-SCNC: 20 MMOL/L (ref 7–19)
APTT: 27.5 SEC (ref 26–36.2)
AST SERPL-CCNC: 23 U/L (ref 5–32)
BASE EXCESS ARTERIAL: -1.5 MMOL/L (ref -2–2)
BASOPHILS ABSOLUTE: 0.1 K/UL (ref 0–0.2)
BASOPHILS RELATIVE PERCENT: 2 % (ref 0–1)
BILIRUB SERPL-MCNC: 0.4 MG/DL (ref 0.2–1.2)
BUN BLDV-MCNC: 60 MG/DL (ref 6–20)
CALCIUM SERPL-MCNC: 9.1 MG/DL (ref 8.6–10)
CARBOXYHEMOGLOBIN ARTERIAL: 1.9 % (ref 0–5)
CHLORIDE BLD-SCNC: 88 MMOL/L (ref 98–111)
CO2: 22 MMOL/L (ref 22–29)
CREAT SERPL-MCNC: 6.8 MG/DL (ref 0.5–0.9)
EOSINOPHILS ABSOLUTE: 0.2 K/UL (ref 0–0.6)
EOSINOPHILS RELATIVE PERCENT: 3.9 % (ref 0–5)
ETHANOL: <10 MG/DL (ref 0–0.08)
GFR AFRICAN AMERICAN: 8
GFR NON-AFRICAN AMERICAN: 6
GLUCOSE BLD-MCNC: 115 MG/DL (ref 70–99)
GLUCOSE BLD-MCNC: 238 MG/DL (ref 70–99)
GLUCOSE BLD-MCNC: 329 MG/DL (ref 70–99)
GLUCOSE BLD-MCNC: 332 MG/DL
GLUCOSE BLD-MCNC: 332 MG/DL (ref 70–99)
GLUCOSE BLD-MCNC: 381 MG/DL (ref 74–109)
GLUCOSE BLD-MCNC: 65 MG/DL (ref 70–99)
GLUCOSE BLD-MCNC: 68 MG/DL (ref 70–99)
GLUCOSE BLD-MCNC: 74 MG/DL (ref 70–99)
HCO3 ARTERIAL: 24.3 MMOL/L (ref 22–26)
HCT VFR BLD CALC: 40.3 % (ref 37–47)
HEMOGLOBIN, ART, EXTENDED: 12.5 G/DL (ref 12–16)
HEMOGLOBIN: 12.7 G/DL (ref 12–16)
IMMATURE GRANULOCYTES #: 0 K/UL
INR BLD: 0.97 (ref 0.88–1.18)
LYMPHOCYTES ABSOLUTE: 1 K/UL (ref 1.1–4.5)
LYMPHOCYTES RELATIVE PERCENT: 16.9 % (ref 20–40)
MCH RBC QN AUTO: 29.5 PG (ref 27–31)
MCHC RBC AUTO-ENTMCNC: 31.5 G/DL (ref 33–37)
MCV RBC AUTO: 93.7 FL (ref 81–99)
METHEMOGLOBIN ARTERIAL: 0.6 %
MONOCYTES ABSOLUTE: 0.5 K/UL (ref 0–0.9)
MONOCYTES RELATIVE PERCENT: 8 % (ref 0–10)
NEUTROPHILS ABSOLUTE: 4.1 K/UL (ref 1.5–7.5)
NEUTROPHILS RELATIVE PERCENT: 68.9 % (ref 50–65)
O2 CONTENT ARTERIAL: 15.4 ML/DL
O2 SAT, ARTERIAL: 87.9 %
O2 THERAPY: ABNORMAL
PCO2 ARTERIAL: 44 MMHG (ref 35–45)
PDW BLD-RTO: 15.9 % (ref 11.5–14.5)
PERFORMED ON: ABNORMAL
PERFORMED ON: NORMAL
PH ARTERIAL: 7.35 (ref 7.35–7.45)
PLATELET # BLD: 125 K/UL (ref 130–400)
PMV BLD AUTO: 10.4 FL (ref 9.4–12.3)
PO2 ARTERIAL: 55 MMHG (ref 80–100)
POTASSIUM REFLEX MAGNESIUM: 6.6 MMOL/L (ref 3.5–5)
POTASSIUM, WHOLE BLOOD: 5.6
PROTHROMBIN TIME: 13.1 SEC (ref 12–14.6)
RBC # BLD: 4.3 M/UL (ref 4.2–5.4)
SARS-COV-2, NAAT: NOT DETECTED
SODIUM BLD-SCNC: 130 MMOL/L (ref 136–145)
T4 FREE: 0.81 NG/DL (ref 0.93–1.7)
TOTAL PROTEIN: 7.1 G/DL (ref 6.6–8.7)
TROPONIN: 0.14 NG/ML (ref 0–0.03)
TSH REFLEX FT4: 30.76 UIU/ML (ref 0.35–5.5)
WBC # BLD: 5.9 K/UL (ref 4.8–10.8)

## 2021-11-18 PROCEDURE — 6370000000 HC RX 637 (ALT 250 FOR IP): Performed by: EMERGENCY MEDICINE

## 2021-11-18 PROCEDURE — 80053 COMPREHEN METABOLIC PANEL: CPT

## 2021-11-18 PROCEDURE — 84439 ASSAY OF FREE THYROXINE: CPT

## 2021-11-18 PROCEDURE — 2700000000 HC OXYGEN THERAPY PER DAY

## 2021-11-18 PROCEDURE — 70498 CT ANGIOGRAPHY NECK: CPT

## 2021-11-18 PROCEDURE — 70450 CT HEAD/BRAIN W/O DYE: CPT

## 2021-11-18 PROCEDURE — 84484 ASSAY OF TROPONIN QUANT: CPT

## 2021-11-18 PROCEDURE — 85610 PROTHROMBIN TIME: CPT

## 2021-11-18 PROCEDURE — 6360000002 HC RX W HCPCS: Performed by: INTERNAL MEDICINE

## 2021-11-18 PROCEDURE — 85025 COMPLETE CBC W/AUTO DIFF WBC: CPT

## 2021-11-18 PROCEDURE — 2500000003 HC RX 250 WO HCPCS: Performed by: INTERNAL MEDICINE

## 2021-11-18 PROCEDURE — 71045 X-RAY EXAM CHEST 1 VIEW: CPT

## 2021-11-18 PROCEDURE — 2500000003 HC RX 250 WO HCPCS: Performed by: EMERGENCY MEDICINE

## 2021-11-18 PROCEDURE — 2000000000 HC ICU R&B

## 2021-11-18 PROCEDURE — 99285 EMERGENCY DEPT VISIT HI MDM: CPT

## 2021-11-18 PROCEDURE — 6360000004 HC RX CONTRAST MEDICATION: Performed by: EMERGENCY MEDICINE

## 2021-11-18 PROCEDURE — 36415 COLL VENOUS BLD VENIPUNCTURE: CPT

## 2021-11-18 PROCEDURE — 96374 THER/PROPH/DIAG INJ IV PUSH: CPT

## 2021-11-18 PROCEDURE — 70496 CT ANGIOGRAPHY HEAD: CPT

## 2021-11-18 PROCEDURE — 84443 ASSAY THYROID STIM HORMONE: CPT

## 2021-11-18 PROCEDURE — 6370000000 HC RX 637 (ALT 250 FOR IP): Performed by: INTERNAL MEDICINE

## 2021-11-18 PROCEDURE — 94640 AIRWAY INHALATION TREATMENT: CPT

## 2021-11-18 PROCEDURE — 93005 ELECTROCARDIOGRAM TRACING: CPT | Performed by: EMERGENCY MEDICINE

## 2021-11-18 PROCEDURE — 8010000000 HC HEMODIALYSIS ACUTE INPT

## 2021-11-18 PROCEDURE — 82803 BLOOD GASES ANY COMBINATION: CPT

## 2021-11-18 PROCEDURE — 85730 THROMBOPLASTIN TIME PARTIAL: CPT

## 2021-11-18 PROCEDURE — 87635 SARS-COV-2 COVID-19 AMP PRB: CPT

## 2021-11-18 PROCEDURE — 36600 WITHDRAWAL OF ARTERIAL BLOOD: CPT

## 2021-11-18 PROCEDURE — 2580000003 HC RX 258: Performed by: EMERGENCY MEDICINE

## 2021-11-18 PROCEDURE — 82947 ASSAY GLUCOSE BLOOD QUANT: CPT

## 2021-11-18 PROCEDURE — 84132 ASSAY OF SERUM POTASSIUM: CPT

## 2021-11-18 PROCEDURE — 82077 ASSAY SPEC XCP UR&BREATH IA: CPT

## 2021-11-18 RX ORDER — SENNA PLUS 8.6 MG/1
1 TABLET ORAL DAILY
Status: DISCONTINUED | OUTPATIENT
Start: 2021-11-18 | End: 2021-11-25 | Stop reason: HOSPADM

## 2021-11-18 RX ORDER — DULOXETIN HYDROCHLORIDE 60 MG/1
60 CAPSULE, DELAYED RELEASE ORAL DAILY
Status: DISCONTINUED | OUTPATIENT
Start: 2021-11-18 | End: 2021-11-25 | Stop reason: HOSPADM

## 2021-11-18 RX ORDER — HYDRALAZINE HYDROCHLORIDE 20 MG/ML
10 INJECTION INTRAMUSCULAR; INTRAVENOUS EVERY 6 HOURS PRN
Status: DISCONTINUED | OUTPATIENT
Start: 2021-11-18 | End: 2021-11-25 | Stop reason: HOSPADM

## 2021-11-18 RX ORDER — HYDROCODONE BITARTRATE AND ACETAMINOPHEN 5; 325 MG/1; MG/1
1 TABLET ORAL EVERY 6 HOURS PRN
Status: DISCONTINUED | OUTPATIENT
Start: 2021-11-18 | End: 2021-11-23

## 2021-11-18 RX ORDER — NICOTINE POLACRILEX 4 MG
15 LOZENGE BUCCAL PRN
Status: DISCONTINUED | OUTPATIENT
Start: 2021-11-18 | End: 2021-11-25 | Stop reason: HOSPADM

## 2021-11-18 RX ORDER — DEXTROSE MONOHYDRATE 50 MG/ML
100 INJECTION, SOLUTION INTRAVENOUS PRN
Status: DISCONTINUED | OUTPATIENT
Start: 2021-11-18 | End: 2021-11-25 | Stop reason: HOSPADM

## 2021-11-18 RX ORDER — ISOSORBIDE MONONITRATE 60 MG/1
120 TABLET, EXTENDED RELEASE ORAL DAILY
Status: DISCONTINUED | OUTPATIENT
Start: 2021-11-18 | End: 2021-11-25 | Stop reason: HOSPADM

## 2021-11-18 RX ORDER — ATORVASTATIN CALCIUM 40 MG/1
20 TABLET, FILM COATED ORAL DAILY
Status: DISCONTINUED | OUTPATIENT
Start: 2021-11-18 | End: 2021-11-25 | Stop reason: HOSPADM

## 2021-11-18 RX ORDER — DEXTROSE MONOHYDRATE 25 G/50ML
12.5 INJECTION, SOLUTION INTRAVENOUS PRN
Status: DISCONTINUED | OUTPATIENT
Start: 2021-11-18 | End: 2021-11-25 | Stop reason: HOSPADM

## 2021-11-18 RX ORDER — LEVOTHYROXINE SODIUM 0.07 MG/1
150 TABLET ORAL DAILY
Status: DISCONTINUED | OUTPATIENT
Start: 2021-11-18 | End: 2021-11-25 | Stop reason: HOSPADM

## 2021-11-18 RX ORDER — INSULIN GLARGINE 100 [IU]/ML
10 INJECTION, SOLUTION SUBCUTANEOUS NIGHTLY
Status: DISCONTINUED | OUTPATIENT
Start: 2021-11-18 | End: 2021-11-25 | Stop reason: HOSPADM

## 2021-11-18 RX ORDER — ALBUTEROL SULFATE 90 UG/1
2 AEROSOL, METERED RESPIRATORY (INHALATION) EVERY 4 HOURS PRN
Status: DISCONTINUED | OUTPATIENT
Start: 2021-11-18 | End: 2021-11-25 | Stop reason: HOSPADM

## 2021-11-18 RX ORDER — ACETAMINOPHEN 650 MG/1
650 SUPPOSITORY RECTAL EVERY 6 HOURS PRN
Status: DISCONTINUED | OUTPATIENT
Start: 2021-11-18 | End: 2021-11-25 | Stop reason: HOSPADM

## 2021-11-18 RX ORDER — SEVELAMER CARBONATE 800 MG/1
800 TABLET, FILM COATED ORAL
Status: DISCONTINUED | OUTPATIENT
Start: 2021-11-18 | End: 2021-11-25 | Stop reason: HOSPADM

## 2021-11-18 RX ORDER — CALCIUM GLUCONATE 94 MG/ML
1000 INJECTION, SOLUTION INTRAVENOUS ONCE
Status: COMPLETED | OUTPATIENT
Start: 2021-11-18 | End: 2021-11-18

## 2021-11-18 RX ORDER — LISINOPRIL 10 MG/1
20 TABLET ORAL 2 TIMES DAILY
Status: DISCONTINUED | OUTPATIENT
Start: 2021-11-18 | End: 2021-11-25 | Stop reason: HOSPADM

## 2021-11-18 RX ORDER — ACETAMINOPHEN 325 MG/1
650 TABLET ORAL EVERY 6 HOURS PRN
Status: DISCONTINUED | OUTPATIENT
Start: 2021-11-18 | End: 2021-11-25 | Stop reason: HOSPADM

## 2021-11-18 RX ORDER — NIFEDIPINE 60 MG/1
60 TABLET, EXTENDED RELEASE ORAL DAILY
Status: DISCONTINUED | OUTPATIENT
Start: 2021-11-18 | End: 2021-11-22

## 2021-11-18 RX ORDER — ROPINIROLE 4 MG/1
4 TABLET, FILM COATED ORAL NIGHTLY
Status: DISCONTINUED | OUTPATIENT
Start: 2021-11-18 | End: 2021-11-25 | Stop reason: HOSPADM

## 2021-11-18 RX ORDER — DEXTROSE MONOHYDRATE 25 G/50ML
25 INJECTION, SOLUTION INTRAVENOUS ONCE
Status: COMPLETED | OUTPATIENT
Start: 2021-11-18 | End: 2021-11-18

## 2021-11-18 RX ORDER — LABETALOL HYDROCHLORIDE 5 MG/ML
20 INJECTION, SOLUTION INTRAVENOUS EVERY 4 HOURS PRN
Status: DISCONTINUED | OUTPATIENT
Start: 2021-11-18 | End: 2021-11-25 | Stop reason: HOSPADM

## 2021-11-18 RX ORDER — CLONIDINE HYDROCHLORIDE 0.1 MG/1
0.3 TABLET ORAL EVERY 8 HOURS SCHEDULED
Status: DISCONTINUED | OUTPATIENT
Start: 2021-11-18 | End: 2021-11-22

## 2021-11-18 RX ORDER — HYDROCHLOROTHIAZIDE 25 MG/1
50 TABLET ORAL DAILY
Status: DISCONTINUED | OUTPATIENT
Start: 2021-11-18 | End: 2021-11-25 | Stop reason: HOSPADM

## 2021-11-18 RX ORDER — METOPROLOL SUCCINATE 50 MG/1
100 TABLET, EXTENDED RELEASE ORAL
Status: DISCONTINUED | OUTPATIENT
Start: 2021-11-18 | End: 2021-11-25 | Stop reason: HOSPADM

## 2021-11-18 RX ADMIN — SENNOSIDES 8.6 MG: 8.6 TABLET, FILM COATED ORAL at 14:44

## 2021-11-18 RX ADMIN — HYDRALAZINE HYDROCHLORIDE 75 MG: 25 TABLET, FILM COATED ORAL at 23:54

## 2021-11-18 RX ADMIN — ATORVASTATIN CALCIUM 20 MG: 40 TABLET, FILM COATED ORAL at 14:44

## 2021-11-18 RX ADMIN — DEXTROSE MONOHYDRATE 12.5 G: 25 INJECTION, SOLUTION INTRAVENOUS at 23:54

## 2021-11-18 RX ADMIN — LEVOTHYROXINE SODIUM 150 MCG: 75 TABLET ORAL at 14:44

## 2021-11-18 RX ADMIN — HYDROCHLOROTHIAZIDE 50 MG: 25 TABLET ORAL at 14:46

## 2021-11-18 RX ADMIN — INSULIN HUMAN 10 UNITS: 100 INJECTION, SOLUTION PARENTERAL at 08:03

## 2021-11-18 RX ADMIN — CLONIDINE HYDROCHLORIDE 0.3 MG: 0.1 TABLET ORAL at 20:05

## 2021-11-18 RX ADMIN — CALCIUM GLUCONATE 1000 MG: 98 INJECTION, SOLUTION INTRAVENOUS at 10:20

## 2021-11-18 RX ADMIN — ALBUTEROL SULFATE 10 MG: 2.5 SOLUTION RESPIRATORY (INHALATION) at 09:12

## 2021-11-18 RX ADMIN — NIFEDIPINE 60 MG: 60 TABLET, EXTENDED RELEASE ORAL at 15:37

## 2021-11-18 RX ADMIN — DULOXETINE 60 MG: 60 CAPSULE, DELAYED RELEASE ORAL at 14:44

## 2021-11-18 RX ADMIN — ROPINIROLE HYDROCHLORIDE 4 MG: 4 TABLET, FILM COATED ORAL at 20:05

## 2021-11-18 RX ADMIN — CLONIDINE HYDROCHLORIDE 0.3 MG: 0.1 TABLET ORAL at 14:46

## 2021-11-18 RX ADMIN — METOPROLOL SUCCINATE 100 MG: 50 TABLET, EXTENDED RELEASE ORAL at 14:44

## 2021-11-18 RX ADMIN — HYDRALAZINE HYDROCHLORIDE 75 MG: 25 TABLET, FILM COATED ORAL at 15:37

## 2021-11-18 RX ADMIN — DEXTROSE MONOHYDRATE 25 G: 25 INJECTION, SOLUTION INTRAVENOUS at 20:42

## 2021-11-18 RX ADMIN — INSULIN LISPRO 2 UNITS: 100 INJECTION, SOLUTION INTRAVENOUS; SUBCUTANEOUS at 15:46

## 2021-11-18 RX ADMIN — IOPAMIDOL 90 ML: 755 INJECTION, SOLUTION INTRAVENOUS at 06:53

## 2021-11-18 RX ADMIN — ISOSORBIDE MONONITRATE 120 MG: 60 TABLET, EXTENDED RELEASE ORAL at 15:37

## 2021-11-18 RX ADMIN — HYDRALAZINE HYDROCHLORIDE 10 MG: 20 INJECTION INTRAMUSCULAR; INTRAVENOUS at 14:36

## 2021-11-18 RX ADMIN — DEXTROSE MONOHYDRATE 5 MG/HR: 50 INJECTION, SOLUTION INTRAVENOUS at 08:56

## 2021-11-18 RX ADMIN — SODIUM BICARBONATE 50 MEQ: 84 INJECTION, SOLUTION INTRAVENOUS at 10:19

## 2021-11-18 NOTE — ED PROVIDER NOTES
[facility] EMERGENCY DEPT  eMERGENCY dEPARTMENT eNCOUnter      Pt Name: Nelson Little  MRN: [id number]  [place] 1970  Date of evaluation: 11/18/2021  Provider: Sofia Velasco MD    CHIEF COMPLAINT       Chief Complaint   Patient presents with    Altered Mental Status     altered mental status per dialysis. EMS called stoke alert. HISTORY OF PRESENT ILLNESS   (Location/Symptom, Timing/Onset,Context/Setting, Quality, Duration, Modifying Factors, Severity)  Note limiting factors. Nelson Little is a 45 y.o. female who presents to the emergency department code stroke    69-year-old hemodialysis patient transferred from dialysis center by ambulance with acute altered mental state and left-sided weakness a complaint of bilateral arm pain. Last time known well 11:30 PM last night. This morning on arrival to dialysis the patient seemed altered. Normally fully conscious and alert and capable of interacting with staff and personnel. This morning there was mention of left-sided facial droop and left-sided weakness per EMS. Patient's not able to give any history right now when questioned about pain she says both arms hurt. .  She has some confusion where she is at. Currently she appears to be moving all extremities. No report of infection or fever. No history of recent trauma. Extremely hypertensive by report. The history is provided by the patient, the EMS personnel and medical records. The history is limited by the condition of the patient. NursingNotes were reviewed. REVIEW OF SYSTEMS    (2-9 systems for level 4, 10 or more for level 5)     Review of Systems   Unable to perform ROS: Mental status change       A complete review of systems was performed and is negative except as noted above in the HPI.        PAST MEDICAL HISTORY     Past Medical History:   Diagnosis Date    Arthritis     Chronic kidney disease, stage 5, kidney failure ([facility] [address]     Closed fracture of right shoulder girdle, with routine healing, subsequent encounter     DM (diabetes mellitus) type II controlled with renal manifestation ([facility] [address] 06/1993    Gastroparesis     GERD (gastroesophageal reflux disease)     Hemodialysis patient ([facility] [address]     dialysis on tues, thur, and sat at [facility]    Hypertension     Hypothyroid     Nasal congestion     recent    Neuropathy     Noncompliance with medications     Palliative care patient 10/08/2019    Restless leg syndrome          SURGICAL HISTORY       Past Surgical History:   Procedure Laterality Date    BREAST SURGERY Left 2008    infected milk gland removed    BREAST SURGERY Right 5/25/2021    WEDGE EXCISION RIGHT BREAST DUCT WITH LACRIMAL DUCT PROBE, PEC BLOCK performed by Wayne Kohli MD at [address] [facility]      2008    COLONOSCOPY Left 9/17/2020    Dr Ajay Boone hepatic flexure-Severe tortuosity with severe spasming, 1 yr recall    DIALYSIS FISTULA CREATION Left 1/25/2019    REVISION LEFT UPPER EXTREMITY AV ACCESS WITH LEFT BRACHIAL ARTERY TO LEFT AXILLARY VEIN WITH  INTERPOSITIONAL ARTEGRAFT   performed by Karyle Molly, MD at [address]  2012    [address] Right 1/25/2019    INSERTION OF RIGHT INTERNAL JUGULAR HEMODIALYSIS CATHETER performed by Karyle Molly, MD at [address] [facility]  08/17/2018    1.  Moderately increased stainable iron identified by special stain in Kupffer cells and occasional hepatocytes. Negative for evidence of significant portal or lobular inflammation.  Negative for evidence of significant fibrosis    SC COLONOSCOPY W/BIOPSY SINGLE/MULTIPLE N/A 10/20/2017    Dr Anish Yi prep-Tubular AP (-) dysplasia x 2--3 yr recall    SC EGD TRANSORAL BIOPSY SINGLE/MULTIPLE N/A 12/27/2016    Dr Chavis Creed EGD TRANSORAL BIOPSY SINGLE/MULTIPLE N/A 10/20/2017    Dr Amanda Ordaz (-)   [address] [facility] VASCULAR SURGERY Left 2016    fistula by Dr Joellen Melchor at [place] [address] SURGERY  10/02/2017    [facility]. Left upper fistulograms/venograms, balloon angioplasty cephalic vein arch [id number] conquest.    VASCULAR SURGERY  08/2018    FISTULOGRAM    VASCULAR SURGERY  10/29/2018    [facility]. Left upper fistulograms/venograms    VASCULAR SURGERY  12/19/2018    [facility]. Arch aortogram,left upper arteriograms.  VASCULAR SURGERY  03/06/2019    [facility]. Removal of tunneled dialyis catheter right internal jugular vein.  VASCULAR SURGERY  08/28/2019    [facility]. Left upper extremity fistulogram including venography to the superior vena cava. Balloon angioplasty left subclavian vein with 10mm x 40mm conquest balloon. Balloon angioplasty mid/proximal upper arm cephalic vein with 36AL x 40mm conquest balloon. Venograms after balloon angioplasty. Stent left mid/proximal upper arm cephalic vein stenosis fluency 10mm x 60mm self-expanding covered stent. Balloon     VASCULAR SURGERY  08/28/2019    cont angioplasty stent with 10mm x 40mm conquest balloon. Completion venograms left upper extremity. CURRENT MEDICATIONS       Previous Medications    ALBUTEROL SULFATE  (90 BASE) MCG/ACT INHALER    Inhale 2 puffs into the lungs every 4 hours as needed    CLONIDINE (CATAPRES) 0.3 MG TABLET    Take 1 tablet by mouth every 8 hours    DULOXETINE (CYMBALTA) 60 MG EXTENDED RELEASE CAPSULE    Take 60 mg by mouth daily    ERGOCALCIFEROL (VITAMIN D2) 10 MCG (400 UNIT) TABS    Take 1.25 mg by mouth every 14 days    HYDRALAZINE (APRESOLINE) 50 MG TABLET    Take 1.5 tablets by mouth every 8 hours    HYDROCHLOROTHIAZIDE (HYDRODIURIL) 25 MG TABLET    Take 2 tablets by mouth daily    HYDROCODONE-ACETAMINOPHEN (NORCO) 5-325 MG PER TABLET    Take 1 tablet by mouth every 6 hours as needed for Pain.     INSULIN ASPART (NOVOLOG FLEXPEN) 100 UNIT/ML INJECTION PEN    Inject 10 Units into the skin 3 times daily Indications: Diabetes    INSULIN GLARGINE (BASAGLAR KWIKPEN) 100 UNIT/ML INJECTION PEN    Inject 40 Units into the skin nightly Indications: Diabetes    ISOSORBIDE MONONITRATE (IMDUR) 120 MG EXTENDED RELEASE TABLET    Take 1 tablet by mouth daily    LEVOTHYROXINE (SYNTHROID) 150 MCG TABLET    Take 150 mcg by mouth Daily    LISINOPRIL (PRINIVIL;ZESTRIL) 20 MG TABLET    Take 1 tablet by mouth 2 times daily    METOPROLOL SUCCINATE (TOPROL XL) 100 MG EXTENDED RELEASE TABLET    Take 1 tablet by mouth every morning (before breakfast)    NIFEDIPINE (ADALAT CC) 60 MG EXTENDED RELEASE TABLET    Take 60 mg by mouth daily    NYSTATIN (MYCOSTATIN) 075973 UNIT/GM CREAM    Apply topically 2 times daily.     NYSTATIN (MYCOSTATIN) 447617 UNIT/ML SUSPENSION    Take 5 mLs by mouth 4 times daily    ROPINIROLE (REQUIP) 2 MG TABLET    Take 4 mg by mouth nightly Indications: Restless Leg Syndrome     SENNA (SENNA-LAX) 8.6 MG TABLET    Take 1 tablet by mouth daily    SEVELAMER (RENVELA) 800 MG TABLET    Take 1 tablet by mouth 3 times daily (with meals)    SIMVASTATIN (ZOCOR) 40 MG TABLET    Take 40 mg by mouth nightly        ALLERGIES     Penicillins, Lamisil advanced [terbinafine], Stadol [butorphanol], and Reglan [metoclopramide]    FAMILY HISTORY       Family History   Problem Relation Age of Onset    Colon Cancer Mother          at 61    Colon Polyps Mother     Lung Cancer Father          at 66    Colon Polyps Father     Stomach Cancer Sister     Breast Cancer Sister 27    Depression Daughter 25        committed suicide    Liver Cancer Neg Hx     Liver Disease Neg Hx     Esophageal Cancer Neg Hx     Rectal Cancer Neg Hx           SOCIAL HISTORY       Social History     Socioeconomic History    Marital status: Single     Spouse name: Not on file    Number of children: 2    Years of education: Not on file    Highest education level: Not on file   Occupational History    Not on file   Tobacco Use    Smoking status: Current Every Day Smoker     Packs/day: 0.50     Years: 33.00     Pack years: 16.50     Types: Cigarettes    Smokeless tobacco: Never Used    Tobacco comment: NOW at 1/4 PD, started at age 24 and has averaged 2 PPD   Vaping Use    Vaping Use: Never used   Substance and Sexual Activity    Alcohol use: No    Drug use: Not Currently     Types: Marijuana Lillie Baca)    Sexual activity: Not Currently     Partners: Male   Other Topics Concern    Not on file   Social History Narrative    Not on file     Social Determinants of Health     Financial Resource Strain:     Difficulty of Paying Living Expenses: Not on file   Food Insecurity:     Worried About Running Out of Food in the Last Year: Not on file    Kendy of Food in the Last Year: Not on file   Transportation Needs:     Lack of Transportation (Medical): Not on file    Lack of Transportation (Non-Medical): Not on file   Physical Activity:     Days of Exercise per Week: Not on file    Minutes of Exercise per Session: Not on file   Stress:     Feeling of Stress : Not on file   Social Connections:     Frequency of Communication with Friends and Family: Not on file    Frequency of Social Gatherings with Friends and Family: Not on file    Attends Buddhism Services: Not on file    Active Member of [address] [place] [address] [place] or Organizations: Not on file    Attends Club or Organization Meetings: Not on file    Marital Status: Not on file   Intimate Partner Violence:     Fear of Current or Ex-Partner: Not on file    Emotionally Abused: Not on file    Physically Abused: Not on file    Sexually Abused: Not on file   Housing Stability:     Unable to Pay for Housing in the Last Year: Not on file    Number of [place] in the Last Year: Not on file    Unstable Housing in the Last Year: Not on file       SCREENINGS   NIH Stroke Scale  Interval: Baseline  Level of Consciousness (1a. ): Alert  LOC Questions (1b. ):  Answers one correctly  LOC Commands (1c. ): Performs both tasks correctly  Best Gaze (2. ): Normal  Visual (3. ): No visual loss  Facial Palsy (4. ): Normal symmetrical movement  Motor Arm, Left (5a. ): No drift  Motor Arm, Right (5b. ): No drift  Motor Leg, Left (6a. ): No drift  Motor Leg, Right (6b. ): No drift  Limb Ataxia (7. ): Absent  Sensory (8. ): Normal  Best Language (9. ): No aphasia  Dysarthria (10. ): Normal  Extinction and Inattention (11): No abnormality  Total: 1         PHYSICAL EXAM    (up to 7 for level 4, 8 or more for level 5)     ED Triage Vitals [11/18/21 0633]   BP Temp Temp Source Pulse Resp SpO2 Height Weight   -- 97.5 °F (36.4 °C) Oral 86 16 99 % -- 150 lb (68 kg)       Physical Exam  Vitals and nursing note reviewed. Constitutional:       Appearance: She is well-developed. She is ill-appearing (She looks chronically ill). HENT:      Head: Normocephalic and atraumatic. Right Ear: Ear canal and external ear normal.      Left Ear: Ear canal and external ear normal.      Nose: Nose normal.      Mouth/Throat:      Pharynx: Oropharynx is clear. Eyes:      General: No scleral icterus. Conjunctiva/sclera: Conjunctivae normal.      Pupils: Pupils are equal, round, and reactive to light. Neck:      Vascular: No carotid bruit. Cardiovascular:      Rate and Rhythm: Normal rate and regular rhythm. Pulses: Normal pulses. Heart sounds: Murmur (2/6 systolic) heard. Comments: Dialysis fistula in the left arm. Hands are cool to the touch pulses are palpable but faint  Pulmonary:      Effort: Pulmonary effort is normal. No respiratory distress. Breath sounds: Rales (In the bases) present. No wheezing. Abdominal:      General: Bowel sounds are normal.      Palpations: Abdomen is soft. Musculoskeletal:         General: No signs of injury. Normal range of motion. Cervical back: Normal range of motion and neck supple. Skin:     General: Skin is warm and dry. Coloration: Skin is not jaundiced. Neurological:      Mental Status: She is alert. GCS: GCS eye subscore is 4. GCS verbal subscore is 4.  GCS motor subscore is 6.      Comments: Currently she has purposeful movement in all 4 extremities. I do not see any clear indication of facial droop. DIAGNOSTIC RESULTS     EKG: All EKG's are interpreted by the Emergency Department Physician who either signs or Co-signs this chart in the absence of a cardiologist.    Sinus rhythm rate 91 QTc 512. I believe the T waves are little tall which would match her hyperkalemia. RADIOLOGY:   Non-plain film images such as CT, Ultrasound and MRI are read by the radiologist. Plainradiographic images are visualized and preliminarily interpreted by the emergency physician with the below findings:    I have reviewed the results. And I have spoken with the interpretative radiologist.    Interpretation per the Radiologist below, if available at the time of this note:    XR CHEST PORTABLE   Final Result   Pulmonary vascular congestion/pulmonary edema. Signed by Dr Gaudencio Escobar   Final Result   Normal CT angiography of the head. The NASCET criteria was utilized for estimation of carotid arterial   stenosis. Signed by Dr Gaudencio Escobar   Final Result   Atheromatous changes of the common and proximal internal   carotid arteries bilaterally with a moderate, 50-69% stenosis of the   proximal left internal carotid artery. The remaining carotid and   vertebral arteries are unremarkable as detailed above. The NASCET criteria was utilized for estimation of carotid arterial   stenosis. Signed by Dr Nanda Ramirez Contrast   Final Result   No acute intracranial abnormality. The above results were immediately called to ER physician, Dr. [facility].    Signed by Dr Ana Canchola            ED BEDSIDE ULTRASOUND:   Performed by ED Physician - none    LABS:  Labs Reviewed   CBC WITH AUTO DIFFERENTIAL - Abnormal; Notable for the following components:       Result Value    MCHC 31.5 (*)     RDW 15.9 (*)     Platelets 133 (*) Neutrophils % 68.9 (*)     Lymphocytes % 16.9 (*)     Basophils % 2.0 (*)     Lymphocytes Absolute 1.0 (*)     All other components within normal limits   COMPREHENSIVE METABOLIC PANEL W/ REFLEX TO MG FOR LOW K - Abnormal; Notable for the following components:    Sodium 130 (*)     Potassium reflex Magnesium 6.6 (*)     Chloride 88 (*)     Anion Gap 20 (*)     Glucose 381 (*)     BUN 60 (*)     CREATININE 6.8 (*)     GFR Non- 6 (*)     GFR African American 8 (*)     Alkaline Phosphatase 151 (*)     All other components within normal limits    Narrative:     CALL  [place]  [facility] tel. ,  Chemistry results called to and read back by Emely Llanos RN AT [address], 11/18/2021  07:25, by ANTONIO   TROPONIN - Abnormal; Notable for the following components:    Troponin 0.14 (*)     All other components within normal limits    Narrative:     CALL  [place]  [facility] tel. ,  Chemistry results called to and read back by Emely Llanos RN AT [address], 11/18/2021  07:25, by ANTONIO   POCT GLUCOSE - Abnormal; Notable for the following components:    POC Glucose 332 (*)     All other components within normal limits   POCT GLUCOSE - Normal   COVID-19, RAPID   PROTIME-INR   APTT   ETHANOL   LACTIC ACID, PLASMA   BLOOD GAS, ARTERIAL       All other labs were within normal range or not returned as of this dictation.     EMERGENCY DEPARTMENT COURSE and DIFFERENTIALDIAGNOSIS/MDM:   Vitals:    Vitals:    11/18/21 0633 11/18/21 0702 11/18/21 0712 11/18/21 0807   BP: (!) 259/100 (!) 247/93 (!) 243/105 (!) 251/100   Pulse: 86 82 85 88   Resp: 16 9 9 18   Temp: 97.5 °F (36.4 °C)      TempSrc: Oral      SpO2: 99% 100% 93% (!) 89%   Weight: 150 lb (68 kg)          MDM  Number of Diagnoses or Management Options  Acute respiratory failure with hypoxia (HCC)  Altered mental status, unspecified altered mental status type  ESRD (end stage renal disease) on dialysis (HCC)  Hyperkalemia  Hypertensive urgency  Other hypervolemia  Diagnosis management comments: Review of chart note see any indication patient's had stroke before. But hypertensive urgency of multiple comorbidities including renal failure and hemodialysis. Diabetic related complications as well. Accu-Chek was 400 here. 8:04 AM.  No clear evidence that this is an embolic event on CT head or CTAs. Certainly could be organic hypertension or diabetes she is in need of dialysis she is volume overloaded potassium 6.6 I am giving insulin IV for the potassium until I can get her dialyzed and I will be talking with the ICU attending and nephrology next. Staff reported seeing sats down to 85 so O2 was started I ordered ABGs.    8:30 AM Case discussed Dr. Diane Alonzo. Advise go ahead and start Cardene drip. Current blood pressure 248/95. Total goal of systolic and 159 for starters. I think this is okay there is no evidence of stroke on CT or any obstruction on vascular studies. CONSULTS:  IP CONSULT TO NEPHROLOGY  IP CONSULT TO HOSPITALIST  PALLIATIVE CARE EVAL    PROCEDURES:  Unless otherwise notedbelow, none     Procedures    FINAL IMPRESSION     1. Altered mental status, unspecified altered mental status type    2. Hypertensive urgency    3. ESRD (end stage renal disease) on dialysis ([facility] [address]    4. Hyperkalemia    5.  Acute respiratory failure with hypoxia (HCC)    6. Other hypervolemia          DISPOSITION/PLAN   DISPOSITION Decision To Admit 11/18/2021 08:02:04 AM      PATIENT REFERRED TO:  @FUP@    DISCHARGE MEDICATIONS:  New Prescriptions    No medications on file          (Please note that portions of this note were completed with a voice recognition program.  Efforts were made to edit the dictations butoccasionally words are mis-transcribed.)    David Edwards MD (electronically signed)  AttendingEmergency Physician          Alvin Rice MD  11/18/21 1002       Alvin Rice MD  11/18/21 0100

## 2021-11-18 NOTE — PROGRESS NOTES
pH, Arterial 7.350 - 7.450 7.350    pCO2, Arterial 35.0 - 45.0 mmHg 44.0    pO2, Arterial 80.0 - 100.0 mmHg 55. 0 Low     HCO3, Arterial 22.0 - 26.0 mmol/L 24.3    Base Excess, Arterial -2.0 - 2.0 mmol/L -1.5    Hemoglobin, Art, Extended 12.0 - 16.0 g/dL 12.5    O2 Sat, Arterial >92 % 87.9 Low Panic     Carboxyhgb, Arterial 0.0 - 5.0 % 1.9    Comment:      0.0-1.5   (Smokers 1.5-5.0)    Methemoglobin, Arterial <1.5 % 0.6    O2 Content, Arterial Not Established mL/dL 15.4    O2 Therapy  Unknown    Resulting Agency       AT +  RR 20  RR  NC @ 2 LPM  Oleksandr Farooq RN was notified of panic values.  Erika Scott said she would turn O2 up to NC 3 LPM.

## 2021-11-18 NOTE — CONSULTS
Nephrology ([facility]) Consult Note      Patient:  Evette Ramey  YOB: 1970  Date of Service: 11/18/2021  MRN: [id number]   Acct: [de-identified]   Primary Care Physician: ROMINA Hull  Advance Directive: Full Code  Admit Date: 11/18/2021       Hospital Day: 0  Referring Provider: Jamal Dooley MD    Patient independently seen and examined, Chart, Consults, Notes, Operative notes, Labs, Cardiology, and Radiology studies reviewed as available. Subjective:  Evette Ramey is a 45 y.o. female for whom we were consulted for evaluation and treatment of end-stage renal disease. Patient has a Tuesday Thursday Saturday fashion at the [facility] dialysis clinic. She presented in transfer from the clinic with altered mental status and hypotension. She was found to have left-sided weakness and blood pressures over systolics of 279. She was admitted to the ICU for further evaluation and treatment. On initial examination, she was not answering questions appropriately and therefore unable to fully participate in history and physical examination. History taken in chart review and discussion from Dr. Derick Vizcarra of the emergency room Dr. Bridgett Mayer of the hospital service. Last recorded well time was 2330 on 11/17.     Dialysis   Pt was seen on renal replacement therapy and I have personally seen and evaluated the patient and directed the therapy  Modality: Hemodialysis  Access: Arterial Venous Fistula  Location: left upper  QB: 450  QD: 700  UF: 1220      Allergies:  Penicillins, Lamisil advanced [terbinafine], Stadol [butorphanol], and Reglan [metoclopramide]    Medicines:  Current Facility-Administered Medications   Medication Dose Route Frequency Provider Last Rate Last Admin    niCARdipine (CARDENE) 25 mg in dextrose 5 % 250 mL infusion  3-15 mg/hr IntraVENous Continuous Jamal Dooley MD 50 mL/hr at 11/18/21 0856 5 mg/hr at 11/18/21 0856    albuterol sulfate  (90 Base) MCG/ACT inhaler 2 puff  2 puff Inhalation Q4H PRN Charmaine Humphreys MD        cloNIDine (CATAPRES) tablet 0.3 mg  0.3 mg Oral 3 times per day Charmaine Humphreys MD        DULoxetine (CYMBALTA) extended release capsule 60 mg  60 mg Oral Daily Charmaine Humphreys MD        hydrALAZINE (APRESOLINE) tablet 75 mg  75 mg Oral 3 times per day Charmaine Humphreys MD        hydroCHLOROthiazide (HYDRODIURIL) tablet 50 mg  50 mg Oral Daily Charmaine Humphreys MD        HYDROcodone-acetaminophen [facility]) 5-325 MG per tablet 1 tablet  1 tablet Oral Q6H PRN Charmaine Humphreys MD        insulin glargine (LANTUS) injection vial 10 Units  10 Units SubCUTAneous Nightly Charmaine Humphreys MD        hydrALAZINE (APRESOLINE) injection 10 mg  10 mg IntraVENous Q6H PRN Charmaine Humphreys MD        labetalol (NORMODYNE;TRANDATE) injection 20 mg  20 mg IntraVENous Q4H PRN Charmaine Humphreys MD        insulin lispro (HUMALOG) injection vial 0-6 Units  0-6 Units SubCUTAneous TID  Charmaine Humphreys MD        insulin lispro (HUMALOG) injection vial 0-3 Units  0-3 Units SubCUTAneous Nightly Charmaine Humphreys MD        isosorbide mononitrate (IMDUR) extended release tablet 120 mg  120 mg Oral Daily Charmaine Humphreys MD        levothyroxine (SYNTHROID) tablet 150 mcg  150 mcg Oral Daily Charmaine Humphreys MD        metoprolol succinate (TOPROL XL) extended release tablet 100 mg  100 mg Oral QAM AC Charmaine Humphreys MD        NIFEdipine (ADALAT CC) extended release tablet 60 mg  60 mg Oral Daily Charmaine Humprheys MD        rOPINIRole (REQUIP) tablet 4 mg  4 mg Oral Nightly Charmaine Humphreys MD        [facility]) tablet 8.6 mg  1 tablet Oral Daily Charmaine Humphreys MD        sevelamer (RENVELA) tablet 800 mg  800 mg Oral TID  Charmaine Humphreys MD        atorvastatin (LIPITOR) tablet 20 mg  20 mg Oral Daily Charmaine Humphreys MD        acetaminophen (TYLENOL) tablet 650 mg  650 mg Oral Q6H PRN Charmaine Humphreys MD        Or    acetaminophen (TYLENOL) suppository 650 mg  650 mg Rectal Q6H PRN Larissa Gutierrez MD        [Held by provider] lisinopril (PRINIVIL;ZESTRIL) tablet 20 mg  20 mg Oral BID Larissa Gutierrez MD        insulin regular (HUMULIN R;NOVOLIN R) injection 10 Units  10 Units IntraVENous Once Larissa Gutierrez MD        And    dextrose 50 % IV solution  25 g IntraVENous Once Larissa Gutierrez MD        glucose (GLUTOSE) 40 % oral gel 15 g  15 g Oral PRN Larissa Gutierrez MD        dextrose 50 % IV solution  12.5 g IntraVENous PRN Larissa Gutierrez MD        glucagon (rDNA) injection 1 mg  1 mg IntraMUSCular PRN Larissa Gutierrez MD        dextrose 5 % solution  100 mL/hr IntraVENous PRN Larissa Gutierrez MD        calcium gluconate 10 % injection 1,000 mg  1,000 mg IntraVENous Once Larissa Gutierrez MD        sodium bicarbonate 8.4 % injection 50 mEq  50 mEq IntraVENous Once Larissa Gutierrez MD         Current Outpatient Medications   Medication Sig Dispense Refill    HYDROcodone-acetaminophen (NORCO) 5-325 MG per tablet Take 1 tablet by mouth every 6 hours as needed for Pain. 10 tablet 0    insulin glargine (BASAGLAR KWIKPEN) 100 UNIT/ML injection pen Inject 40 Units into the skin nightly Indications: Diabetes      nystatin (MYCOSTATIN) 950323 UNIT/ML suspension Take 5 mLs by mouth 4 times daily 1 Bottle 1    nystatin (MYCOSTATIN) 056482 UNIT/GM cream Apply topically 2 times daily.  15 g 0    insulin aspart (NOVOLOG FLEXPEN) 100 UNIT/ML injection pen Inject 10 Units into the skin 3 times daily Indications: Diabetes 5 pen 3    hydrALAZINE (APRESOLINE) 50 MG tablet Take 1.5 tablets by mouth every 8 hours 90 tablet 0    hydroCHLOROthiazide (HYDRODIURIL) 25 MG tablet Take 2 tablets by mouth daily 30 tablet 3    Ergocalciferol (VITAMIN D2) 10 MCG (400 UNIT) TABS Take 1.25 mg by mouth every 14 days      senna (SENNA-LAX) 8.6 MG tablet Take 1 tablet by mouth daily      cloNIDine (CATAPRES) 0.3 MG tablet Take 1 tablet by mouth every 8 hours 60 tablet 3    sevelamer (RENVELA) 800 MG tablet Baker-To hepatic flexure-Severe tortuosity with severe spasming, 1 yr recall    DIALYSIS FISTULA CREATION Left 1/25/2019    REVISION LEFT UPPER EXTREMITY AV ACCESS WITH LEFT BRACHIAL ARTERY TO LEFT AXILLARY VEIN WITH  INTERPOSITIONAL ARTEGRAFT   performed by Siri Denver, MD at [address]  2012    [address] Right 1/25/2019    INSERTION OF RIGHT INTERNAL JUGULAR HEMODIALYSIS CATHETER performed by Siri Denver, MD at [facility]  08/17/2018    1.  Moderately increased stainable iron identified by special stain in Kupffer cells and occasional hepatocytes. Negative for evidence of significant portal or lobular inflammation. Negative for evidence of significant fibrosis    [facility] COLONOSCOPY W/BIOPSY SINGLE/MULTIPLE N/A 10/20/2017    Dr Mikey Nieves prep-Tubular AP (-) dysplasia x 2--3 yr recall    [facility] EGD TRANSORAL BIOPSY SINGLE/MULTIPLE N/A 12/27/2016    Dr Zachary Richardson EGD TRANSORAL BIOPSY SINGLE/MULTIPLE N/A 10/20/2017    Dr Judy Vivar (-)   [address] [facility] VASCULAR SURGERY Left 2016    fistula by Dr Liliya Huston at [address]  10/02/2017    [facility]. Left upper fistulograms/venograms, balloon angioplasty cephalic vein arch 66Q36 conquest.    VASCULAR SURGERY  08/2018    FISTULOGRAM    VASCULAR SURGERY  10/29/2018    [facility]. Left upper fistulograms/venograms    VASCULAR SURGERY  12/19/2018    [facility]. Arch aortogram,left upper arteriograms.  VASCULAR SURGERY  03/06/2019    [facility]. Removal of tunneled dialyis catheter right internal jugular vein.  VASCULAR SURGERY  08/28/2019    [facility]. Left upper extremity fistulogram including venography to the superior vena cava. Balloon angioplasty left subclavian vein with 10mm x 40mm conquest balloon. Balloon angioplasty mid/proximal upper arm cephalic vein with 08NW x 40mm conquest balloon. Venograms after balloon angioplasty. Stent left mid/proximal upper arm cephalic vein stenosis fluency 10mm x 60mm self-expanding covered stent. Balloon     VASCULAR SURGERY  2019    cont angioplasty stent with 10mm x 40mm conquest balloon. Completion venograms left upper extremity. Family History  Family History   Problem Relation Age of Onset    Colon Cancer Mother          at 63    Colon Polyps Mother     Lung Cancer Father          at 79    Colon Polyps Father     Stomach Cancer Sister     Breast Cancer Sister 27    Depression Daughter 25        committed suicide    Liver Cancer Neg Hx     Liver Disease Neg Hx     Esophageal Cancer Neg Hx     Rectal Cancer Neg Hx        Social History  Social History     Socioeconomic History    Marital status: Single     Spouse name: Not on file    Number of children: 2    Years of education: Not on file    Highest education level: Not on file   Occupational History    Not on file   Tobacco Use    Smoking status: Current Every Day Smoker     Packs/day: 0.50     Years: 33.00     Pack years: 16.50     Types: Cigarettes    Smokeless tobacco: Never Used    Tobacco comment: NOW at 1/4 PD, started at age 24 and has averaged 2 PPD   Vaping Use    Vaping Use: Never used   Substance and Sexual Activity    Alcohol use: No    Drug use: Not Currently     Types: Marijuana Charmayne Stai)    Sexual activity: Not Currently     Partners: Male   Other Topics Concern    Not on file   Social History Narrative    Not on file     Social Determinants of Health     Financial Resource Strain:     Difficulty of Paying Living Expenses: Not on file   Food Insecurity:     Worried About Running Out of Food in the Last Year: Not on file    Kendy of Food in the Last Year: Not on file   Transportation Needs:     Lack of Transportation (Medical): Not on file    Lack of Transportation (Non-Medical):  Not on file   Physical Activity:     Days of Exercise per Week: Not on file    Minutes of Exercise per Session: Not on file   Stress:     Feeling of Stress : Not on file   Social Connections:     Frequency of Communication with Friends and Family: Not on file    Frequency of Social Gatherings with Friends and Family: Not on file    Attends Sabianism Services: Not on file    Active Member of Clubs or Organizations: Not on file    Attends Club or Organization Meetings: Not on file    Marital Status: Not on file   Intimate Partner Violence:     Fear of Current or Ex-Partner: Not on file    Emotionally Abused: Not on file    Physically Abused: Not on file    Sexually Abused: Not on file   Housing Stability:     Unable to Pay for Housing in the Last Year: Not on file    Number of [place] in the Last Year: Not on file    Unstable Housing in the Last Year: Not on file         Review of Systems:  History obtained from unobtainable from patient due to mental status      Objective:  Patient Vitals for the past 24 hrs:   BP Temp Temp src Pulse Resp SpO2 Weight   11/18/21 0912     21 97 %    11/18/21 0902 (!) 238/105   91 22 98 %    11/18/21 0807 (!) 251/100   88 18 (!) 89 %    11/18/21 0712 (!) 243/105   85 9 93 %    11/18/21 0702 (!) 247/93   82 9 100 %    11/18/21 0633 (!) 259/100 97.5 °F (36.4 °C) Oral 86 16 99 % 150 lb (68 kg)     No intake or output data in the 24 hours ending 11/18/21 0930  General: Ill-appearing and unable to dissipate history and physical examination  Chest:  some bibasilar crackles  CVS: regular rate and rhythm  Abdominal: soft, nontender, normal bowel sounds  Extremities: no cyanosis or edema  Skin: warm and dry without rash      Labs:  BMP:   Recent Labs     11/18/21 0633 11/18/21  0848   *  --    K 6.6* 5.6   CL 88*  --    CO2 22  --    BUN 60*  --    CREATININE 6.8*  --    CALCIUM 9.1  --      CBC:   Recent Labs     11/18/21 0633   WBC 5.9   HGB 12.7   HCT 40.3   MCV 93.7   *     LIVER PROFILE:   Recent Labs     11/18/21 0633   AST 23   ALT 12   BILITOT 0.4   ALKPHOS 151*     PT/INR:   Recent Labs     11/18/21  8640 PROTIME 13.1   INR 0.97     APTT:   Recent Labs     11/18/21  0633   APTT 27.5     BNP:  No results for input(s): BNP in the last 72 hours. Ionized Calcium:No results for input(s): IONCA in the last 72 hours. Magnesium:No results for input(s): MG in the last 72 hours. Phosphorus:No results for input(s): PHOS in the last 72 hours. HgbA1C: No results for input(s): LABA1C in the last 72 hours. Hepatic:   Recent Labs     11/18/21  0633   ALKPHOS 151*   ALT 12   AST 23   PROT 7.1   BILITOT 0.4   LABALBU 3.9     Lactic Acid: No results for input(s): LACTA in the last 72 hours. Troponin: No results for input(s): CKTOTAL, CKMB, TROPONINT in the last 72 hours. ABGs: No results for input(s): PH, PCO2, PO2, HCO3, O2SAT in the last 72 hours. CRP:  No results for input(s): CRP in the last 72 hours. Sed Rate:  No results for input(s): SEDRATE in the last 72 hours. Cultures:   No results for input(s): CULTURE in the last 72 hours. No results for input(s): BC, Bonnie Gray in the last 72 hours. No results for input(s): CXSURG in the last 72 hours. Radiology reports as per the Radiologist  Radiology: CT Head WO Contrast    Result Date: 11/18/2021  Examination. CT HEAD WO CONTRAST 11/18/2021 6:50 AM History: Stroke symptoms. Altered mental status. Left-sided weakness. DLP: 619 mGycm. The CT scan of the head is performed without intravenous contrast enhancement. The images are acquired in axial plane and subsequent reconstruction in coronal and sagittal planes. The comparison is made with the previous study dated 10/21/2020. There is no evidence of a mass. No midline shift. There is no intracranial hemorrhage or hematoma. Moderately dilated ventricles, basal cistern and the cortical sulci are similar to the previous study representing a chronic volume loss/atrophy. The scattered areas of chronic white matter ischemia are seen in the centrum semiovale bilaterally. The gray-white matter differentiation is maintained. The images reviewed in bone window show no acute bony abnormality. The visualized paranasal sinuses and mastoid air cells are clear. No acute intracranial abnormality. The above results were immediately called to ER physician, Dr. JUÁREZ [facility]. Signed by Dr Tiara Nguyen    Result Date: 11/18/2021  Examination. XR CHEST PORTABLE 11/18/2021 7:15 AM History: Hypertension. Left-sided weakness. A frontal portable upright view of the chest is compared with the previous study dated 5/29/2021. There are persistent interstitial changes in the lungs bilaterally which may represent pulmonary vascular congestion or pulmonary edema. There is no pleural effusion. No pneumothorax. There is moderate fullness of the latha bilaterally which may represent pulmonary arterial hypertension. Heart size is not optimally evaluated due to portable projection. Left subclavian venous stent is seen in place. There is moderate diffuse osteopenia. Pulmonary vascular congestion/pulmonary edema. Signed by Dr Emiliana Chawla Date: 11/18/2021  Examination. CTA NECK W CONTRAST 11/18/2021 6:59 AM History: Stroke symptoms. DLP: 297 mGycm. The CT angiography of the neck is performed after intravenous contrast enhancement. The images are acquired in axial plane with subsequent 2-D reconstruction in coronal and sagittal planes and 3-D maximum intensity projection reconstruction. There is no previous similar study for comparison. Atheromatous changes of the limited visualized aortic arch is noted. Mild atheromatous changes of the origin of the brachiocephalic trunk is seen. No stenosis. It divides into normal-appearing right common carotid and subclavian arteries. Normal origin of the left common carotid artery seen. A large calcific plaque is seen at the origin of the left subclavian artery with less than 50% focal stenosis at the origin.  Incidentally noted is a left subclavian venous stent in place which is patent. Both common carotid arteries are patent and most of the course. There is a calcific plaque along the anterior aspect of the mid left common carotid artery without stenosis. There is a large plaque at the bifurcation of the left common carotid artery which extends posteriorly into the proximal carotid bulb with 50-69% stenosis of the carotid bulb. It extends into the proximal internal carotid artery with less than 50% stenosis of the left internal carotid artery. The remaining left internal carotid artery is normal in caliber with moderate tortuous course. There is a large plaque along the posterior right lateral aspect of the distal right common carotid artery which extends into the carotid bulb with without a significant stenosis of the bulb. Normal size moderately tortuous right internal carotid artery seen. No stenosis. There is a small calcific plaque at the origin of the right vertebral artery from the right subclavian vein. No significant stenosis. There is normal origin of the left vertebral artery. Normal vertebral arteries bilaterally is seen in the rest of the course in the neck. No focal stenosis or aneurysmal dilatation. Atheromatous changes of the common and proximal internal carotid arteries bilaterally with a moderate, 50-69% stenosis of the proximal left internal carotid artery. The remaining carotid and vertebral arteries are unremarkable as detailed above. The NASCET criteria was utilized for estimation of carotid arterial stenosis. Signed by Dr Fatou Gaston    Result Date: 11/18/2021  Examination. CTA HEAD W CONTRAST 11/18/2021 6:53 AM History: Stroke symptoms. DLP: 297 mGycm. The CT angiography of the head is performed after intravenous contrast enhancement. The images are acquired in axial plane and subsequent 2-D reconstruction in coronal and sagittal planes and 3-D maximum intensity projection reconstruction.  There is no previous study for comparison. The correlation is made with CT scan of the head obtained earlier today. Normal size internal carotid arteries are seen at the base of the skull, carotid canals and cavernous sinus bilaterally. There are atheromatous plaques in the internal carotid arteries bilaterally in the cavernous sinus without a significant stenosis. No aneurysmal dilatation. Normal size suprasellar internal carotid arteries bilaterally is seen subsequently dividing into normal-appearing anterior and middle cerebral arteries. There is subsequent normal insular and opercular branches. Normal size vertebral arteries enter the foramen magnum and join to make a normal-sized basilar artery which subsequently divides into normal-appearing posterior cerebral arteries bilaterally. Subsequent normal temporal and occipital branches. No areas of focal stenosis or aneurysmal dilatation. Normal CT angiography of the head. The NASCET criteria was utilized for estimation of carotid arterial stenosis. Signed by Dr Shayan Guevara   ESRD  DM2 with nephropathy on long-term insulin  HTN  Hyponatremia  Hyperkalemia  Secondary Hyperparathyroidism  Anemia CKD  Seizure disorder  Hypothyroidism  Acute hypoxemic respiratory failure  Acute pulmonary edema  And acute metabolic encephalopathy    Plan:  Discussed with patient as able, nursing  We will facilitate dialysis treatment as soon as possible for correction of metabolic derangements including hyperkalemia  Cardene drip for improvement of blood pressure control from systolics of 011 has been ordered  Continue to monitor labs closely in the ICU  Neurology evaluation if no improvement with blood pressure control and dialysis      Thank you for the consult, we appreciate the opportunity to provide care to your patients. Feel free to contact me if I can be of any further assistance.       Ruthann Almaraz MD  11/18/21  9:30 AM

## 2021-11-18 NOTE — H&P
Hospitalist History & Physical  [facility]    Patient: Donold Habermann   : 1970   MRN: [id number]  Code Status: Full Code  PCP: ROMINA Bryson  Date of Service: 2021    Chief Complaint:   Altered mental status    History of Present Illness:   57-year-old female with past medical history as listed below presented to ER from dialysis center with altered mental status and left-sided weakness. Last known well time 11:30 PM last night. History provided per medical record as patient is unable to provide any meaningful history. Dialysis staff states patient seemed altered and was also unable to provide history at that time. At this time, patient is moving all 4 extremities but remains confused. No further history available.     Review of Systems:   A comprehensive review of systems was negative except for: Neurological: positive for Confusion    Past Medical History:     Past Medical History:   Diagnosis Date    Arthritis     Chronic kidney disease, stage 5, kidney failure ([address]     Closed fracture of right shoulder girdle, with routine healing, subsequent encounter     DM (diabetes mellitus) type II controlled with renal manifestation ([facility] 1993    Gastroparesis     GERD (gastroesophageal reflux disease)     Hemodialysis patient ([facility]     dialysis on tues, thur, and sat at [facility]    Hypertension     Hypothyroid     Nasal congestion     recent    Neuropathy     Noncompliance with medications     Palliative care patient 10/08/2019    Restless leg syndrome          Past Surgical History:     Past Surgical History:   Procedure Laterality Date    BREAST SURGERY Left     infected milk gland removed    BREAST SURGERY Right 2021    WEDGE EXCISION RIGHT BREAST DUCT WITH LACRIMAL DUCT PROBE, PEC BLOCK performed by Dianna Albert MD at [address] [place] [address]          COLONOSCOPY Left 2020    Dr Delano Rodrigues hepatic flexure-Severe tortuosity with severe spasming, 1 yr recall    DIALYSIS FISTULA CREATION Left 1/25/2019    REVISION LEFT UPPER EXTREMITY AV ACCESS WITH LEFT BRACHIAL ARTERY TO LEFT AXILLARY VEIN WITH  INTERPOSITIONAL ARTEGRAFT   performed by Jose Johnson MD at [address]  2012    [address] Right 1/25/2019    INSERTION OF RIGHT INTERNAL JUGULAR HEMODIALYSIS CATHETER performed by Jose Johnson MD at [address]  08/17/2018    1.  Moderately increased stainable iron identified by special stain in Kupffer cells and occasional hepatocytes. Negative for evidence of significant portal or lobular inflammation. Negative for evidence of significant fibrosis    SC COLONOSCOPY W/BIOPSY SINGLE/MULTIPLE N/A 10/20/2017    Dr Allison Herrera prep-Tubular AP (-) dysplasia x 2--3 yr recall    SC EGD TRANSORAL BIOPSY SINGLE/MULTIPLE N/A 12/27/2016    Dr Emanuel Lemus EGD TRANSORAL BIOPSY SINGLE/MULTIPLE N/A 10/20/2017    Dr Allyson Hammond (-)   [facility] VASCULAR SURGERY Left 2016    fistula by Dr Nelida Ontiveros at [address]  10/02/2017    [facility]. Left upper fistulograms/venograms, balloon angioplasty cephalic vein arch 22F00 conquest.    VASCULAR SURGERY  08/2018    FISTULOGRAM    VASCULAR SURGERY  10/29/2018    [facility]. Left upper fistulograms/venograms    VASCULAR SURGERY  12/19/2018    [facility]. Arch aortogram,left upper arteriograms.  VASCULAR SURGERY  03/06/2019    [facility]. Removal of tunneled dialyis catheter right internal jugular vein.  VASCULAR SURGERY  08/28/2019    [facility]. Left upper extremity fistulogram including venography to the superior vena cava. Balloon angioplasty left subclavian vein with 10mm x 40mm conquest balloon. Balloon angioplasty mid/proximal upper arm cephalic vein with 18UP x 40mm conquest balloon. Venograms after balloon angioplasty. Stent left mid/proximal upper arm cephalic vein stenosis fluency 10mm x 60mm self-expanding covered stent. Balloon     VASCULAR SURGERY  2019    cont angioplasty stent with 10mm x 40mm conquest balloon. Completion venograms left upper extremity. Family History:     Family History   Problem Relation Age of Onset    Colon Cancer Mother          at 63    Colon Polyps Mother     Lung Cancer Father          at 79    Colon Polyps Father     Stomach Cancer Sister     Breast Cancer Sister 27    Depression Daughter 25        committed suicide    Liver Cancer Neg Hx     Liver Disease Neg Hx     Esophageal Cancer Neg Hx     Rectal Cancer Neg Hx         Social History:     Social History     Socioeconomic History    Marital status: Single     Spouse name: Not on file    Number of children: 2    Years of education: Not on file    Highest education level: Not on file   Occupational History    Not on file   Tobacco Use    Smoking status: Current Every Day Smoker     Packs/day: 0.50     Years: 33.00     Pack years: 16.50     Types: Cigarettes    Smokeless tobacco: Never Used    Tobacco comment: NOW at 1/4 PD, started at age 24 and has averaged 2 PPD   Vaping Use    Vaping Use: Never used   Substance and Sexual Activity    Alcohol use: No    Drug use: Not Currently     Types: Marijuana Jose Blow)    Sexual activity: Not Currently     Partners: Male   Other Topics Concern    Not on file   Social History Narrative    Not on file     Social Determinants of Health     Financial Resource Strain:     Difficulty of Paying Living Expenses: Not on file   Food Insecurity:     Worried About Running Out of Food in the Last Year: Not on file    Kendy of Food in the Last Year: Not on file   Transportation Needs:     Lack of Transportation (Medical): Not on file    Lack of Transportation (Non-Medical):  Not on file   Physical Activity:     Days of Exercise per Week: Not on file    Minutes of Exercise per Session: Not on file   Stress:     Feeling of Stress : Not on file   Social Connections:     Frequency of Communication with Friends and Family: Not on file    Frequency of Social Gatherings with Friends and Family: Not on file    Attends [employer] Services: Not on file    Active Member of Clubs or Organizations: Not on file    Attends Club or Organization Meetings: Not on file    Marital Status: Not on file   Intimate Partner Violence:     Fear of Current or Ex-Partner: Not on file    Emotionally Abused: Not on file    Physically Abused: Not on file    Sexually Abused: Not on file   Housing Stability:     Unable to Pay for Housing in the Last Year: Not on file    Number of [place] in the Last Year: Not on file    Unstable Housing in the Last Year: Not on file       Prior to Admission Medications:   Not in a hospital admission. Allergies:      Allergies   Allergen Reactions    Penicillins Hives and Shortness Of Breath    Lamisil Advanced [Terbinafine] Nausea And Vomiting    Stadol [Butorphanol] Nausea And Vomiting     And made me feel crazy    Reglan [Metoclopramide] Other (See Comments)     Body twitching and crawling out of her skin feeling         Physical Exam:   BP (!) 251/100   Pulse 88   Temp 97.5 °F (36.4 °C) (Oral)   Resp 18   Wt 150 lb (68 kg)   SpO2 (!) 89%   BMI 24.21 kg/m²     General: no acute distress  HEENT: normocephalic, atraumatic  Neck: supple, symmetrical, trachea midline   Lungs: clear to auscultation bilaterally   Cardiovascular: s1 and s2 normal  Abdomen: soft, positive bowel sounds  Extremities: no cyanosis   Neuro: confused, 5/5 power in all 4 ext, unable to cooperate with remainder of exam  Skin: normal color and texture     Recent Results (from the past 72 hour(s))   CBC Auto Differential    Collection Time: 11/18/21  6:33 AM   Result Value Ref Range    WBC 5.9 4.8 - 10.8 K/uL    RBC 4.30 4.20 - 5.40 M/uL    Hemoglobin 12.7 12.0 - 16.0 g/dL    Hematocrit 40.3 37.0 - 47.0 %    MCV 93.7 81.0 - 99.0 fL    MCH 29.5 27.0 - 31.0 pg    MCHC 31.5 (L) 33.0 - 37.0 g/dL    RDW 15.9 (H) 11.5 - 14.5 %    Platelets 751 (L) 867 - 400 K/uL    MPV 10.4 9.4 - 12.3 fL    Neutrophils % 68.9 (H) 50.0 - 65.0 %    Lymphocytes % 16.9 (L) 20.0 - 40.0 %    Monocytes % 8.0 0.0 - 10.0 %    Eosinophils % 3.9 0.0 - 5.0 %    Basophils % 2.0 (H) 0.0 - 1.0 %    Neutrophils Absolute 4.1 1.5 - 7.5 K/uL    Immature Granulocytes # 0.0 K/uL    Lymphocytes Absolute 1.0 (L) 1.1 - 4.5 K/uL    Monocytes Absolute 0.50 0.00 - 0.90 K/uL    Eosinophils Absolute 0.20 0.00 - 0.60 K/uL    Basophils Absolute 0.10 0.00 - 0.20 K/uL   Comprehensive Metabolic Panel w/ Reflex to MG    Collection Time: 11/18/21  6:33 AM   Result Value Ref Range    Sodium 130 (L) 136 - 145 mmol/L    Potassium reflex Magnesium 6.6 (HH) 3.5 - 5.0 mmol/L    Chloride 88 (L) 98 - 111 mmol/L    CO2 22 22 - 29 mmol/L    Anion Gap 20 (H) 7 - 19 mmol/L    Glucose 381 (H) 74 - 109 mg/dL    BUN 60 (H) 6 - 20 mg/dL    CREATININE 6.8 (H) 0.5 - 0.9 mg/dL    GFR Non- 6 (A) >60    GFR  8 (L) >59    Calcium 9.1 8.6 - 10.0 mg/dL    Total Protein 7.1 6.6 - 8.7 g/dL    Albumin 3.9 3.5 - 5.2 g/dL    Total Bilirubin 0.4 0.2 - 1.2 mg/dL    Alkaline Phosphatase 151 (H) 35 - 104 U/L    ALT 12 5 - 33 U/L    AST 23 5 - 32 U/L   Protime-INR    Collection Time: 11/18/21  6:33 AM   Result Value Ref Range    Protime 13.1 12.0 - 14.6 sec    INR 0.97 0.88 - 1.18   APTT    Collection Time: 11/18/21  6:33 AM   Result Value Ref Range    aPTT 27.5 26.0 - 36.2 sec   Troponin    Collection Time: 11/18/21  6:33 AM   Result Value Ref Range    Troponin 0.14 (HH) 0.00 - 0.03 ng/mL   Ethanol    Collection Time: 11/18/21  6:33 AM   Result Value Ref Range    Ethanol Lvl <10 mg/dL   SARS-CoV-2 NAAT (Rapid)    Collection Time: 11/18/21  6:46 AM    Specimen: Nasopharyngeal Swab   Result Value Ref Range    SARS-CoV-2, NAAT Not Detected Not Detected   POCT Glucose    Collection Time: 11/18/21  6:47 AM   Result Value Ref Range    POC Glucose 332 (H) 70 - 99 mg/dl    Performed on [employer]    POCT GLUCOSE    Collection Time: 11/18/21  6:49 AM   Result Value Ref Range    Glucose 332 mg/dL   Blood Gas, Arterial    Collection Time: 11/18/21  8:48 AM   Result Value Ref Range    pH, Arterial 7.350 7.350 - 7.450    pCO2, Arterial 44.0 35.0 - 45.0 mmHg    pO2, Arterial 55.0 (L) 80.0 - 100.0 mmHg    HCO3, Arterial 24.3 22.0 - 26.0 mmol/L    Base Excess, Arterial -1.5 -2.0 - 2.0 mmol/L    Hemoglobin, Art, Extended 12.5 12.0 - 16.0 g/dL    O2 Sat, Arterial 87.9 (LL) >92 %    Carboxyhgb, Arterial 1.9 0.0 - 5.0 %    Methemoglobin, Arterial 0.6 <1.5 %    O2 Content, Arterial 15.4 Not Established mL/dL    O2 Therapy Unknown    Potassium, Whole Blood    Collection Time: 11/18/21  8:48 AM   Result Value Ref Range    Potassium, Whole Blood 5.6        No intake/output data recorded. CT Head WO Contrast    Result Date: 11/18/2021  No acute intracranial abnormality. The above results were immediately called to ER physician, Dr. Stephon Medeiros. Signed by Dr Sabina Simeon    Result Date: 11/18/2021  Pulmonary vascular congestion/pulmonary edema. Signed by Dr Nasim Patiño    Result Date: 11/18/2021  Atheromatous changes of the common and proximal internal carotid arteries bilaterally with a moderate, 50-69% stenosis of the proximal left internal carotid artery. The remaining carotid and vertebral arteries are unremarkable as detailed above. The NASCET criteria was utilized for estimation of carotid arterial stenosis. Signed by Dr Nasim Patiño    Result Date: 11/18/2021  Normal CT angiography of the head. The NASCET criteria was utilized for estimation of carotid arterial stenosis.  Signed by Dr Kimberly Parra and Plan:   Hypertensive emergency  Cardene gtt  Ensure adequate cerebral perfusion pressure  MRI brain as warranted    Acute encephalopathy  Suspect metabolic etiology secondary to above  Pan CT imaging negative for acute process  Motor function intact during my interview and examination  Neurochecks  Thyroid panel  Avoid offending agents  Polypharmacy needs to be addressed with PCP  Further work-up if no improvement with current treatment plan  Neurology consult as warranted    ESRD on HD/hyperkalemia/nephrogenic pulmonary edema  Lisinopril held  Cocktail administered  Renal consulted for HD  Strict I's and O's  Daily weights    IDDM  Meds on board    Tobacco dependence      DVT prophylaxis  SCDs    Total critical care time: 70 minutes  Total time spent managing the care of this patient: 70 minutes    Cinthia Love MD  11/18/2021 9:04 AM

## 2021-11-18 NOTE — ED TRIAGE NOTES
Pt presented to dialysis today. Staff states that pt appeared to be altered. EMS states that upon their arrival pt was able to answer some questions. EMS states that they noticed left arm weakness, left leg weakness, and left sided facial droop. Pt stated to EMS that she went to bed on 11/17/21 at 2330 and felt normal. Pt states that she woke up feeling funny and went to dialysis.

## 2021-11-18 NOTE — ED NOTES
Bed: 01-A  Expected date: 11/18/21  Expected time:   Means of arrival: Ambulance  Comments:  [facility]  11/18/21 0800

## 2021-11-19 LAB
ALBUMIN SERPL-MCNC: 3.4 G/DL (ref 3.5–5.2)
ALP BLD-CCNC: 108 U/L (ref 35–104)
ALT SERPL-CCNC: 8 U/L (ref 5–33)
ANION GAP SERPL CALCULATED.3IONS-SCNC: 16 MMOL/L (ref 7–19)
AST SERPL-CCNC: 12 U/L (ref 5–32)
BASOPHILS ABSOLUTE: 0.1 K/UL (ref 0–0.2)
BASOPHILS RELATIVE PERCENT: 2.2 % (ref 0–1)
BILIRUB SERPL-MCNC: 0.4 MG/DL (ref 0.2–1.2)
BUN BLDV-MCNC: 26 MG/DL (ref 6–20)
CALCIUM SERPL-MCNC: 8.4 MG/DL (ref 8.6–10)
CHLORIDE BLD-SCNC: 95 MMOL/L (ref 98–111)
CO2: 23 MMOL/L (ref 22–29)
CREAT SERPL-MCNC: 4.7 MG/DL (ref 0.5–0.9)
EOSINOPHILS ABSOLUTE: 0.2 K/UL (ref 0–0.6)
EOSINOPHILS RELATIVE PERCENT: 3.3 % (ref 0–5)
GFR AFRICAN AMERICAN: 12
GFR NON-AFRICAN AMERICAN: 10
GLUCOSE BLD-MCNC: 124 MG/DL (ref 70–99)
GLUCOSE BLD-MCNC: 124 MG/DL (ref 70–99)
GLUCOSE BLD-MCNC: 134 MG/DL (ref 70–99)
GLUCOSE BLD-MCNC: 139 MG/DL (ref 74–109)
GLUCOSE BLD-MCNC: 197 MG/DL (ref 70–99)
GLUCOSE BLD-MCNC: 227 MG/DL (ref 70–99)
GLUCOSE BLD-MCNC: 233 MG/DL (ref 70–99)
HCT VFR BLD CALC: 37.6 % (ref 37–47)
HEMOGLOBIN: 11.4 G/DL (ref 12–16)
IMMATURE GRANULOCYTES #: 0 K/UL
LACTIC ACID: 0.8 MMOL/L (ref 0.5–1.9)
LYMPHOCYTES ABSOLUTE: 0.7 K/UL (ref 1.1–4.5)
LYMPHOCYTES RELATIVE PERCENT: 14.5 % (ref 20–40)
MAGNESIUM: 2.5 MG/DL (ref 1.6–2.6)
MCH RBC QN AUTO: 28.8 PG (ref 27–31)
MCHC RBC AUTO-ENTMCNC: 30.3 G/DL (ref 33–37)
MCV RBC AUTO: 94.9 FL (ref 81–99)
MONOCYTES ABSOLUTE: 0.5 K/UL (ref 0–0.9)
MONOCYTES RELATIVE PERCENT: 10.5 % (ref 0–10)
NEUTROPHILS ABSOLUTE: 3.1 K/UL (ref 1.5–7.5)
NEUTROPHILS RELATIVE PERCENT: 69.1 % (ref 50–65)
PDW BLD-RTO: 16 % (ref 11.5–14.5)
PERFORMED ON: ABNORMAL
PLATELET # BLD: 106 K/UL (ref 130–400)
PMV BLD AUTO: 9.9 FL (ref 9.4–12.3)
POTASSIUM SERPL-SCNC: 5.5 MMOL/L (ref 3.5–5)
RBC # BLD: 3.96 M/UL (ref 4.2–5.4)
SODIUM BLD-SCNC: 134 MMOL/L (ref 136–145)
T4 FREE: 0.99 NG/DL (ref 0.93–1.7)
TOTAL PROTEIN: 5.6 G/DL (ref 6.6–8.7)
TSH REFLEX FT4: 34.16 UIU/ML (ref 0.35–5.5)
WBC # BLD: 4.5 K/UL (ref 4.8–10.8)

## 2021-11-19 PROCEDURE — 1210000000 HC MED SURG R&B

## 2021-11-19 PROCEDURE — 8010000000 HC HEMODIALYSIS ACUTE INPT

## 2021-11-19 PROCEDURE — 84439 ASSAY OF FREE THYROXINE: CPT

## 2021-11-19 PROCEDURE — 6370000000 HC RX 637 (ALT 250 FOR IP): Performed by: INTERNAL MEDICINE

## 2021-11-19 PROCEDURE — 2700000000 HC OXYGEN THERAPY PER DAY

## 2021-11-19 PROCEDURE — 36415 COLL VENOUS BLD VENIPUNCTURE: CPT

## 2021-11-19 PROCEDURE — 82947 ASSAY GLUCOSE BLOOD QUANT: CPT

## 2021-11-19 PROCEDURE — 80053 COMPREHEN METABOLIC PANEL: CPT

## 2021-11-19 PROCEDURE — 5A1D70Z PERFORMANCE OF URINARY FILTRATION, INTERMITTENT, LESS THAN 6 HOURS PER DAY: ICD-10-PCS | Performed by: INTERNAL MEDICINE

## 2021-11-19 PROCEDURE — 83735 ASSAY OF MAGNESIUM: CPT

## 2021-11-19 PROCEDURE — 84443 ASSAY THYROID STIM HORMONE: CPT

## 2021-11-19 PROCEDURE — 83605 ASSAY OF LACTIC ACID: CPT

## 2021-11-19 PROCEDURE — 85025 COMPLETE CBC W/AUTO DIFF WBC: CPT

## 2021-11-19 RX ORDER — HEPARIN SODIUM 5000 [USP'U]/ML
5000 INJECTION, SOLUTION INTRAVENOUS; SUBCUTANEOUS EVERY 8 HOURS SCHEDULED
Status: DISCONTINUED | OUTPATIENT
Start: 2021-11-19 | End: 2021-11-25 | Stop reason: HOSPADM

## 2021-11-19 RX ADMIN — ISOSORBIDE MONONITRATE 120 MG: 60 TABLET, EXTENDED RELEASE ORAL at 09:15

## 2021-11-19 RX ADMIN — CLONIDINE HYDROCHLORIDE 0.3 MG: 0.1 TABLET ORAL at 06:14

## 2021-11-19 RX ADMIN — ROPINIROLE HYDROCHLORIDE 4 MG: 4 TABLET, FILM COATED ORAL at 20:32

## 2021-11-19 RX ADMIN — METOPROLOL SUCCINATE 100 MG: 50 TABLET, EXTENDED RELEASE ORAL at 06:14

## 2021-11-19 RX ADMIN — SEVELAMER CARBONATE 800 MG: 800 TABLET, FILM COATED ORAL at 17:43

## 2021-11-19 RX ADMIN — CLONIDINE HYDROCHLORIDE 0.3 MG: 0.1 TABLET ORAL at 21:19

## 2021-11-19 RX ADMIN — INSULIN LISPRO 1 UNITS: 100 INJECTION, SOLUTION INTRAVENOUS; SUBCUTANEOUS at 20:32

## 2021-11-19 RX ADMIN — NIFEDIPINE 60 MG: 60 TABLET, EXTENDED RELEASE ORAL at 09:15

## 2021-11-19 RX ADMIN — ATORVASTATIN CALCIUM 20 MG: 40 TABLET, FILM COATED ORAL at 09:15

## 2021-11-19 RX ADMIN — SEVELAMER CARBONATE 800 MG: 800 TABLET, FILM COATED ORAL at 09:20

## 2021-11-19 RX ADMIN — HYDRALAZINE HYDROCHLORIDE 75 MG: 25 TABLET, FILM COATED ORAL at 23:39

## 2021-11-19 RX ADMIN — LEVOTHYROXINE SODIUM 150 MCG: 75 TABLET ORAL at 06:14

## 2021-11-19 RX ADMIN — SENNOSIDES 8.6 MG: 8.6 TABLET, FILM COATED ORAL at 09:15

## 2021-11-19 RX ADMIN — DULOXETINE 60 MG: 60 CAPSULE, DELAYED RELEASE ORAL at 09:15

## 2021-11-19 RX ADMIN — INSULIN LISPRO 2 UNITS: 100 INJECTION, SOLUTION INTRAVENOUS; SUBCUTANEOUS at 09:15

## 2021-11-19 RX ADMIN — INSULIN GLARGINE 10 UNITS: 100 INJECTION, SOLUTION SUBCUTANEOUS at 20:32

## 2021-11-19 RX ADMIN — HYDROCHLOROTHIAZIDE 50 MG: 25 TABLET ORAL at 09:15

## 2021-11-19 NOTE — PROGRESS NOTES
4 Eyes Skin Assessment    Micki Tobin is being assessed upon: Transfer to New Unit    I agree that Neo Mcmillan RN, along with Raymundo Shaffer (either 2 RN's or 1 LPN and 1 RN) have performed a thorough Head to Toe Skin Assessment on the patient. ALL assessment sites listed below have been assessed. Areas assessed by both nurses:     [x]   Head, Face, and Ears   [x]   Shoulders, Back, and Chest  [x]   Arms, Elbows, and Hands   [x]   Coccyx, Sacrum, and Ischium  [x]   Legs, Feet, and Heels    Does the Patient have Skin Breakdown?  No    Marek Prevention initiated: No  Wound Care Orders initiated: No    WOC nurse consulted for Pressure Injury (Stage 3,4, Unstageable, DTI, NWPT, and Complex wounds) and New or Established Ostomies: No        Primary Nurse eSignature: Kelby Guido RN on 11/19/2021 at 4:58 PM      Co-Signer eSignature: Electronically signed by Raymundo Shaffer RN on 11/19/21 at 5:03 PM CST

## 2021-11-19 NOTE — PROGRESS NOTES
Radha Has received from ICU to room # 313 . Mental Status: Patient is disoriented. Vitals:    11/19/21 1200   BP: (!) 154/57   Pulse: 63   Resp: 18   Temp: 98 °F (36.7 °C)   SpO2: 98%     Placed on cardiac monitor: No.  Belongings: At patient's bedside. Family at bedside No.  Oriented Patient to room. Call light within reach. Yes. Transfer was: Well tolerated by patient. .    Electronically signed by Katherine Brown RN on 11/19/2021 at 4:57 PM

## 2021-11-19 NOTE — PROGRESS NOTES
Hospitalist Progress Note  [facility]     Patient: Yandel Ellison  : 1970  MRN: [id number]  Code Status: Full Code    Hospital Day: 1   Date of Service: 2021    Subjective:   Patient seen and examined. No current complaints. Past Medical History:   Diagnosis Date    Arthritis     Chronic kidney disease, stage 5, kidney failure (HCC)     Closed fracture of right shoulder girdle, with routine healing, subsequent encounter     DM (diabetes mellitus) type II controlled with renal manifestation ([facility] [address] 1993    Gastroparesis     GERD (gastroesophageal reflux disease)     Hemodialysis patient ([facility] [address]     dialysis on tues, th, and sat at [facility]    Hypertension     Hypothyroid     Nasal congestion     recent    Neuropathy     Noncompliance with medications     Palliative care patient 10/08/2019    Restless leg syndrome        Past Surgical History:   Procedure Laterality Date    BREAST SURGERY Left     infected milk gland removed    BREAST SURGERY Right 2021    WEDGE EXCISION RIGHT BREAST DUCT WITH LACRIMAL DUCT PROBE, PEC BLOCK performed by Elvia Yi MD at [address] [place] [address], [facility]          COLONOSCOPY Left 2020    Dr Bob Burdick hepatic flexure-Severe tortuosity with severe spasming, 1 yr recall    DIALYSIS FISTULA CREATION Left 2019    REVISION LEFT UPPER EXTREMITY AV ACCESS WITH LEFT BRACHIAL ARTERY TO LEFT AXILLARY VEIN WITH  INTERPOSITIONAL ARTEGRAFT   performed by Davion Taylor MD at [facility]  2012    [address] Right 2019    INSERTION OF RIGHT INTERNAL JUGULAR HEMODIALYSIS CATHETER performed by Davion Taylor MD at [address] [facility]  2018    1.  Moderately increased stainable iron identified by special stain in Kupffer cells and occasional hepatocytes. Negative for evidence of significant portal or lobular inflammation.  Negative for evidence of significant fibrosis    LA COLONOSCOPY W/BIOPSY SINGLE/MULTIPLE N/A 10/20/2017    Dr Zena Chau prep-Tubular AP (-) dysplasia x 2--3 yr recall    LA EGD TRANSORAL BIOPSY SINGLE/MULTIPLE N/A 2016    Dr Linda Pettit EGD TRANSORAL BIOPSY SINGLE/MULTIPLE N/A 10/20/2017    Dr Purnima Ivey (-)    TUBAL LIGATION          VASCULAR SURGERY Left 2016    fistula by Dr Rob Willoughby at [address]  10/02/2017    [facility]. Left upper fistulograms/venograms, balloon angioplasty cephalic vein arch [id number] conquest.    VASCULAR SURGERY  2018    FISTULOGRAM    VASCULAR SURGERY  10/29/2018    [facility]. Left upper fistulograms/venograms    VASCULAR SURGERY  2018    [facility]. Arch aortogram,left upper arteriograms.  VASCULAR SURGERY  2019    [facility]. Removal of tunneled dialyis catheter right internal jugular vein.  VASCULAR SURGERY  2019    [facility]. Left upper extremity fistulogram including venography to the superior vena cava. Balloon angioplasty left subclavian vein with 10mm x 40mm conquest balloon. Balloon angioplasty mid/proximal upper arm cephalic vein with 00CG x 40mm conquest balloon. Venograms after balloon angioplasty. Stent left mid/proximal upper arm cephalic vein stenosis fluency 10mm x 60mm self-expanding covered stent. Balloon     VASCULAR SURGERY  2019    cont angioplasty stent with 10mm x 40mm conquest balloon. Completion venograms left upper extremity.        Family History   Problem Relation Age of Onset    Colon Cancer Mother          at 63    Colon Polyps Mother     Lung Cancer Father          at 79    Colon Polyps Father     Stomach Cancer Sister     Breast Cancer Sister 27    Depression Daughter 25        committed suicide    Liver Cancer Neg Hx     Liver Disease Neg Hx     Esophageal Cancer Neg Hx     Rectal Cancer Neg Hx        Social History     Socioeconomic History    Marital status: Single     Spouse name: Not on file    Number of children: 2    Years of education: Not on file    Highest education level: Not on file   Occupational History    Not on file   Tobacco Use    Smoking status: Current Every Day Smoker     Packs/day: 0.50     Years: 33.00     Pack years: 16.50     Types: Cigarettes    Smokeless tobacco: Never Used    Tobacco comment: NOW at 1/4 PD, started at age 24 and has averaged 2 PPD   Vaping Use    Vaping Use: Never used   Substance and Sexual Activity    Alcohol use: No    Drug use: Not Currently     Types: Marijuana Juanetta Alba)    Sexual activity: Not Currently     Partners: Male   Other Topics Concern    Not on file   Social History Narrative    Not on file     Social Determinants of Health     Financial Resource Strain:     Difficulty of Paying Living Expenses: Not on file   Food Insecurity:     Worried About Running Out of Food in the Last Year: Not on file    Kendy of Food in the Last Year: Not on file   Transportation Needs:     Lack of Transportation (Medical): Not on file    Lack of Transportation (Non-Medical):  Not on file   Physical Activity:     Days of Exercise per Week: Not on file    Minutes of Exercise per Session: Not on file   Stress:     Feeling of Stress : Not on file   Social Connections:     Frequency of Communication with Friends and Family: Not on file    Frequency of Social Gatherings with Friends and Family: Not on file    Attends Voodoo Services: Not on file    Active Member of [employer] or Organizations: Not on file    Attends Club or Organization Meetings: Not on file    Marital Status: Not on file   Intimate Partner Violence:     Fear of Current or Ex-Partner: Not on file    Emotionally Abused: Not on file    Physically Abused: Not on file    Sexually Abused: Not on file   Housing Stability:     Unable to Pay for Housing in the Last Year: Not on file    Number of [place] in the Last Year: Not on file    Unstable Housing in the Last Year: Not on file       Current Facility-Administered Medications Medication Dose Route Frequency Provider Last Rate Last Admin    niCARdipine (CARDENE) 25 mg in dextrose 5 % 250 mL infusion  3-15 mg/hr IntraVENous Continuous Tucker Vick MD   Stopped at 11/18/21 1436    albuterol sulfate  (90 Base) MCG/ACT inhaler 2 puff  2 puff Inhalation Q4H PRN Anshu Aquino MD        cloNIDine (CATAPRES) tablet 0.3 mg  0.3 mg Oral 3 times per day Anshu Aquino MD   0.3 mg at 11/19/21 2088    DULoxetine (CYMBALTA) extended release capsule 60 mg  60 mg Oral Daily Anshu Aquino MD   60 mg at 11/19/21 0915    hydrALAZINE (APRESOLINE) tablet 75 mg  75 mg Oral 3 times per day Anshu Aquino MD   75 mg at 11/18/21 2354    hydroCHLOROthiazide (HYDRODIURIL) tablet 50 mg  50 mg Oral Daily Anshu Aquino MD   50 mg at 11/19/21 0915    HYDROcodone-acetaminophen ([facility]) 5-325 MG per tablet 1 tablet  1 tablet Oral Q6H PRN Anshu Aquino MD        insulin glargine (LANTUS) injection vial 10 Units  10 Units SubCUTAneous Nightly Anshu Aquino MD        hydrALAZINE (APRESOLINE) injection 10 mg  10 mg IntraVENous Q6H PRN Anshu Aquino MD   10 mg at 11/18/21 1436    labetalol (NORMODYNE;TRANDATE) injection 20 mg  20 mg IntraVENous Q4H PRN Anshu Aquino MD        insulin lispro (HUMALOG) injection vial 0-6 Units  0-6 Units SubCUTAneous TID WC Anshu Aquino MD   2 Units at 11/19/21 0915    insulin lispro (HUMALOG) injection vial 0-3 Units  0-3 Units SubCUTAneous Nightly Anshu Aquino MD        isosorbide mononitrate (IMDUR) extended release tablet 120 mg  120 mg Oral Daily Anshu Aquino MD   120 mg at 11/19/21 0915    levothyroxine (SYNTHROID) tablet 150 mcg  150 mcg Oral Daily Anshu Aquino MD   150 mcg at 11/19/21 9694    metoprolol succinate (TOPROL XL) extended release tablet 100 mg  100 mg Oral QAM AC Anshu Aquino MD   100 mg at 11/19/21 1246    NIFEdipine (PROCARDIA XL) extended release tablet 60 mg  60 mg Oral Daily Anshu Aquino MD   60 mg at 11/19/21 0961    rOPINIRole (REQUIP) tablet 4 mg  4 mg Oral Nightly Robert Feng MD   4 mg at 11/18/21 2005    senna (SENOKOT) tablet 8.6 mg  1 tablet Oral Daily Robert Feng MD   8.6 mg at 11/19/21 0915    sevelamer (RENVELA) tablet 800 mg  800 mg Oral TID WC Robert Feng MD   800 mg at 11/19/21 0920    atorvastatin (LIPITOR) tablet 20 mg  20 mg Oral Daily Robert Feng MD   20 mg at 11/19/21 0915    acetaminophen (TYLENOL) tablet 650 mg  650 mg Oral Q6H PRN Robert Feng MD        Or   Ninoska Solders acetaminophen (TYLENOL) suppository 650 mg  650 mg Rectal Q6H PRN Robert Feng MD        [Held by provider] lisinopril (PRINIVIL;ZESTRIL) tablet 20 mg  20 mg Oral BID Robert Feng MD        insulin regular (HUMULIN R;NOVOLIN R) injection 10 Units  10 Units IntraVENous Once Robert Feng MD        glucose (GLUTOSE) 40 % oral gel 15 g  15 g Oral PRN Robert Feng MD        dextrose 50 % IV solution  12.5 g IntraVENous PRN Robert Feng MD   12.5 g at 11/18/21 2354    glucagon (rDNA) injection 1 mg  1 mg IntraMUSCular PRN Robert Feng MD        dextrose 5 % solution  100 mL/hr IntraVENous PRN Robert Feng MD             niCARdipine Stopped (11/18/21 1436)    dextrose          Objective:   BP (!) 145/56   Pulse 65   Temp 97.2 °F (36.2 °C) (Temporal)   Resp 24   Ht 5' 6\" (1.676 m)   Wt 148 lb 8 oz (67.4 kg)   SpO2 91%   BMI 23.97 kg/m²     General: no acute distress  HEENT: normocephalic, atraumatic  Neck: supple, symmetrical, trachea midline   Lungs: clear to auscultation bilaterally   Cardiovascular: s1 and s2 normal  Abdomen: soft, positive bowel sounds  Extremities: no cyanosis   Neuro: improving confusion, 5/5 power in all 4 ext  Skin: normal color and texture     Recent Labs     11/18/21  0633 11/19/21  0137   WBC 5.9 4.5*   RBC 4.30 3.96*   HGB 12.7 11.4*   HCT 40.3 37.6   MCV 93.7 94.9   MCH 29.5 28.8   MCHC 31.5* 30.3*   * 106*     Recent Labs     11/18/21  0633 11/18/21  0649 11/18/21  0852 11/19/21 0137   *  --   --  134*   K 6.6*  --  5.6 5.5*   ANIONGAP 20*  --   --  16   CL 88*  --   --  95*   CO2 22  --   --  23   BUN 60*  --   --  26*   CREATININE 6.8*  --   --  4.7*   GLUCOSE 381* 332  --  139*   CALCIUM 9.1  --   --  8.4*     Recent Labs     11/19/21 0137   MG 2.5     Recent Labs     11/18/21 0633 11/19/21 0137   AST 23 12   ALT 12 8   BILITOT 0.4 0.4   ALKPHOS 151* 108*     No results for input(s): PH, PO2, PCO2, HCO3, BE, O2SAT in the last 72 hours. Recent Labs     11/18/21 0633   TROPONINI 0.14*     Recent Labs     11/18/21 0633   INR 0.97     Recent Labs     11/19/21 0137   LACTA 0.8         Intake/Output Summary (Last 24 hours) at 11/19/2021 0928  Last data filed at 11/18/2021 1813  Gross per 24 hour   Intake 215.83 ml   Output 3500 ml   Net -3284.17 ml       CT Head WO Contrast    Result Date: 11/18/2021  Examination. CT HEAD WO CONTRAST 11/18/2021 6:50 AM History: Stroke symptoms. Altered mental status. Left-sided weakness. DLP: 619 mGycm. The CT scan of the head is performed without intravenous contrast enhancement. The images are acquired in axial plane and subsequent reconstruction in coronal and sagittal planes. The comparison is made with the previous study dated 10/21/2020. There is no evidence of a mass. No midline shift. There is no intracranial hemorrhage or hematoma. Moderately dilated ventricles, basal cistern and the cortical sulci are similar to the previous study representing a chronic volume loss/atrophy. The scattered areas of chronic white matter ischemia are seen in the centrum semiovale bilaterally. The gray-white matter differentiation is maintained. The images reviewed in bone window show no acute bony abnormality. The visualized paranasal sinuses and mastoid air cells are clear. No acute intracranial abnormality. The above results were immediately called to ER physician, Dr. Sandra Altman.  Signed by Dr Marycruz Kamara    Result Date: 11/18/2021  Examination. XR CHEST PORTABLE 11/18/2021 7:15 AM History: Hypertension. Left-sided weakness. A frontal portable upright view of the chest is compared with the previous study dated 5/29/2021. There are persistent interstitial changes in the lungs bilaterally which may represent pulmonary vascular congestion or pulmonary edema. There is no pleural effusion. No pneumothorax. There is moderate fullness of the latha bilaterally which may represent pulmonary arterial hypertension. Heart size is not optimally evaluated due to portable projection. Left subclavian venous stent is seen in place. There is moderate diffuse osteopenia. Pulmonary vascular congestion/pulmonary edema. Signed by Dr Live Garcia Date: 11/18/2021  Examination. CTA NECK W CONTRAST 11/18/2021 6:59 AM History: Stroke symptoms. DLP: 297 mGycm. The CT angiography of the neck is performed after intravenous contrast enhancement. The images are acquired in axial plane with subsequent 2-D reconstruction in coronal and sagittal planes and 3-D maximum intensity projection reconstruction. There is no previous similar study for comparison. Atheromatous changes of the limited visualized aortic arch is noted. Mild atheromatous changes of the origin of the brachiocephalic trunk is seen. No stenosis. It divides into normal-appearing right common carotid and subclavian arteries. Normal origin of the left common carotid artery seen. A large calcific plaque is seen at the origin of the left subclavian artery with less than 50% focal stenosis at the origin. Incidentally noted is a left subclavian venous stent in place which is patent. Both common carotid arteries are patent and most of the course. There is a calcific plaque along the anterior aspect of the mid left common carotid artery without stenosis.  There is a large plaque at the bifurcation of the left common carotid artery which extends posteriorly into the proximal carotid bulb with 50-69% stenosis of the carotid bulb. It extends into the proximal internal carotid artery with less than 50% stenosis of the left internal carotid artery. The remaining left internal carotid artery is normal in caliber with moderate tortuous course. There is a large plaque along the posterior right lateral aspect of the distal right common carotid artery which extends into the carotid bulb with without a significant stenosis of the bulb. Normal size moderately tortuous right internal carotid artery seen. No stenosis. There is a small calcific plaque at the origin of the right vertebral artery from the right subclavian vein. No significant stenosis. There is normal origin of the left vertebral artery. Normal vertebral arteries bilaterally is seen in the rest of the course in the neck. No focal stenosis or aneurysmal dilatation. Atheromatous changes of the common and proximal internal carotid arteries bilaterally with a moderate, 50-69% stenosis of the proximal left internal carotid artery. The remaining carotid and vertebral arteries are unremarkable as detailed above. The NASCET criteria was utilized for estimation of carotid arterial stenosis. Signed by Dr Freddy Tabares    Result Date: 11/18/2021  Examination. CTA HEAD W CONTRAST 11/18/2021 6:53 AM History: Stroke symptoms. DLP: 297 mGycm. The CT angiography of the head is performed after intravenous contrast enhancement. The images are acquired in axial plane and subsequent 2-D reconstruction in coronal and sagittal planes and 3-D maximum intensity projection reconstruction. There is no previous study for comparison. The correlation is made with CT scan of the head obtained earlier today. Normal size internal carotid arteries are seen at the base of the skull, carotid canals and cavernous sinus bilaterally.  There are atheromatous plaques in the internal carotid arteries bilaterally in the cavernous sinus without a significant stenosis. No aneurysmal dilatation. Normal size suprasellar internal carotid arteries bilaterally is seen subsequently dividing into normal-appearing anterior and middle cerebral arteries. There is subsequent normal insular and opercular branches. Normal size vertebral arteries enter the foramen magnum and join to make a normal-sized basilar artery which subsequently divides into normal-appearing posterior cerebral arteries bilaterally. Subsequent normal temporal and occipital branches. No areas of focal stenosis or aneurysmal dilatation. Normal CT angiography of the head. The NASCET criteria was utilized for estimation of carotid arterial stenosis.  Signed by Dr Raquel Weldon and Plan:   Hypertensive emergency  Cardene gtt since weaned off  Home meds on board  Counseled on compliance  Ensure adequate cerebral perfusion pressure     Acute encephalopathy  Improving  Suspect metabolic etiology secondary to above  Pan CT imaging negative for acute process  Motor function remains intact  Neurochecks  Repeat thyroid panel given acute stressors  Avoid offending agents  Polypharmacy needs to be addressed with PCP     ESRD on HD/hyperkalemia/nephrogenic pulmonary edema  Lisinopril held  Cocktail administered  Renal following  Strict I's and O's  Daily weights     IDDM  Meds on board     Tobacco dependence  Counseled     DVT prophylaxis  Heparin    Leola Martins MD   11/19/2021 9:28 AM

## 2021-11-19 NOTE — PLAN OF CARE
Problem: Skin Integrity:  Goal: Will show no infection signs and symptoms  Description: Will show no infection signs and symptoms  11/19/2021 0014 by Benigno Brown RN  Outcome: Ongoing  11/18/2021 1429 by Lea Wall  Outcome: Ongoing  Goal: Absence of new skin breakdown  Description: Absence of new skin breakdown  11/19/2021 0014 by Benigno Brown RN  Outcome: Ongoing  11/18/2021 1429 by Lea Wall  Outcome: Ongoing  Goal: Status of oral mucous membranes will improve  Description: Status of oral mucous membranes will improve  11/19/2021 0014 by Benigno Brown RN  Outcome: Ongoing  11/18/2021 1429 by Lea Wall  Outcome: Ongoing  Goal: Skin integrity will be maintained  Description: Skin integrity will be maintained  11/19/2021 0014 by Benigno Brown RN  Outcome: Ongoing  11/18/2021 1429 by Lea Wall  Outcome: Ongoing     Problem: Falls - Risk of:  Goal: Will remain free from falls  Description: Will remain free from falls  11/19/2021 0014 by Benigno Brown RN  Outcome: Ongoing  11/18/2021 1429 by Lea Wall  Outcome: Ongoing  Goal: Absence of physical injury  Description: Absence of physical injury  11/19/2021 0014 by Benigno Brown RN  Outcome: Ongoing  11/18/2021 1429 by Lea Wall  Outcome: Ongoing

## 2021-11-19 NOTE — PROGRESS NOTES
Nephrology ([facility]) Consult Note      Patient:  Aviva Bond  YOB: 1970  Date of Service: 11/19/2021  MRN: [id number]   Acct: [de-identified]   Primary Care Physician: ROMINA Jamison  Advance Directive: Full Code  Admit Date: 11/18/2021       Hospital Day: 1  Referring Provider: Osmin Sy MD    Patient independently seen and examined, Chart, Consults, Notes, Operative notes, Labs, Cardiology, and Radiology studies reviewed as available. Subjective:  Aviva Bond is a 45 y.o. female for whom we were consulted for evaluation and treatment of end-stage renal disease. Patient has a Tuesday Thursday Saturday fashion at the [facility] dialysis clinic. She presented in transfer from the clinic with altered mental status and hypotension. She was found to have left-sided weakness and blood pressures over systolics of 777. She was admitted to the ICU for further evaluation and treatment. On initial examination, she was not answering questions appropriately and therefore unable to fully participate in history and physical examination. History taken in chart review and discussion from Dr. JUÁREZ [facility] of the emergency room Dr. Diane Alonzo of the hospital service. Last recorded well time was 2330 on 11/17. Today, no overnight events. She remains in the ICU off all drips. She was unable to fully dissipate history and physical examination due to altered mental status. Events reviewed with nursing present at the bedside.     Dialysis   Pt was seen on renal replacement therapy and I have personally seen and evaluated the patient and directed the therapy  Modality: Hemodialysis at 1330  Access: Arterial Venous Fistula  Location: left arm  QB: 600  QD: 800  UF: 1000      Allergies:  Penicillins, Lamisil advanced [terbinafine], Stadol [butorphanol], and Reglan [metoclopramide]    Medicines:  Current Facility-Administered Medications   Medication Dose Route Frequency Provider Last Rate Last Admin    niCARdipine (CARDENE) 25 mg in dextrose 5 % 250 mL infusion  3-15 mg/hr IntraVENous Continuous Steph Russell MD   Stopped at 11/18/21 1436    albuterol sulfate  (90 Base) MCG/ACT inhaler 2 puff  2 puff Inhalation Q4H PRN Nelda Ashraf MD        cloNIDine (CATAPRES) tablet 0.3 mg  0.3 mg Oral 3 times per day Nelda Ashraf MD   0.3 mg at 11/19/21 9402    DULoxetine (CYMBALTA) extended release capsule 60 mg  60 mg Oral Daily Nelda Ashraf MD   60 mg at 11/18/21 1444    hydrALAZINE (APRESOLINE) tablet 75 mg  75 mg Oral 3 times per day Nelda Ashraf MD   75 mg at 11/18/21 2354    hydroCHLOROthiazide (HYDRODIURIL) tablet 50 mg  50 mg Oral Daily Nelda Ashraf MD   50 mg at 11/18/21 1446    HYDROcodone-acetaminophen (NORCO) 5-325 MG per tablet 1 tablet  1 tablet Oral Q6H PRN Nelda Ashraf MD        insulin glargine (LANTUS) injection vial 10 Units  10 Units SubCUTAneous Nightly Nelda Ashraf MD        hydrALAZINE (APRESOLINE) injection 10 mg  10 mg IntraVENous Q6H PRN Nelda Ashraf MD   10 mg at 11/18/21 1436    labetalol (NORMODYNE;TRANDATE) injection 20 mg  20 mg IntraVENous Q4H PRN Nelda Ashraf MD        insulin lispro (HUMALOG) injection vial 0-6 Units  0-6 Units SubCUTAneous TID WC Nelda Ashraf MD   2 Units at 11/18/21 1546    insulin lispro (HUMALOG) injection vial 0-3 Units  0-3 Units SubCUTAneous Nightly Nelda Ashraf MD        isosorbide mononitrate (IMDUR) extended release tablet 120 mg  120 mg Oral Daily Nelda Ashraf MD   120 mg at 11/18/21 1537    levothyroxine (SYNTHROID) tablet 150 mcg  150 mcg Oral Daily Nelda Ashraf MD   150 mcg at 11/19/21 3414    metoprolol succinate (TOPROL XL) extended release tablet 100 mg  100 mg Oral QAM AC Nelda Ashraf MD   100 mg at 11/19/21 0900    NIFEdipine (PROCARDIA XL) extended release tablet 60 mg  60 mg Oral Daily Nelda Ashraf MD   60 mg at 11/18/21 1537    rOPINIRole (REQUIP) tablet 4 mg  4 mg Oral Nightly Marycruz Deleon MD   4 mg at 11/18/21 2005    senna (SENOKOT) tablet 8.6 mg  1 tablet Oral Daily Marycruz Deleon MD   8.6 mg at 11/18/21 1444    sevelamer (RENVELA) tablet 800 mg  800 mg Oral TID  Marycruz Deleon MD        atorvastatin (LIPITOR) tablet 20 mg  20 mg Oral Daily Marycruz Deleon MD   20 mg at 11/18/21 1444    acetaminophen (TYLENOL) tablet 650 mg  650 mg Oral Q6H PRN Marycruz Deleon MD        Or   Leena Russell acetaminophen (TYLENOL) suppository 650 mg  650 mg Rectal Q6H PRN Marycruz Deleon MD        [Held by provider] lisinopril (PRINIVIL;ZESTRIL) tablet 20 mg  20 mg Oral BID Marycruz Deleon MD        insulin regular (HUMULIN R;NOVOLIN R) injection 10 Units  10 Units IntraVENous Once Marycruz Deleon MD        glucose (GLUTOSE) 40 % oral gel 15 g  15 g Oral PRN Marycruz Deleon MD        dextrose 50 % IV solution  12.5 g IntraVENous PRN Marycruz Deleon MD   12.5 g at 11/18/21 2354    glucagon (rDNA) injection 1 mg  1 mg IntraMUSCular PRN Marycruz Deleon MD        dextrose 5 % solution  100 mL/hr IntraVENous PRN Marycruz Deleon MD           Past Medical History:  Past Medical History:   Diagnosis Date    Arthritis     Chronic kidney disease, stage 5, kidney failure [facility] [address]     Closed fracture of right shoulder girdle, with routine healing, subsequent encounter     DM (diabetes mellitus) type II controlled with renal manifestation ([facility] [address] 06/1993    Gastroparesis     GERD (gastroesophageal reflux disease)     Hemodialysis patient ([facility] [address]     dialysis on tues, thur, and sat at [facility] clinic    Hypertension     Hypothyroid     Nasal congestion     recent    Neuropathy     Noncompliance with medications     Palliative care patient 10/08/2019    Restless leg syndrome        Past Surgical History:  Past Surgical History:   Procedure Laterality Date    BREAST SURGERY Left 2008    infected milk gland removed    BREAST SURGERY Right 5/25/2021    WEDGE EXCISION RIGHT BREAST DUCT WITH LACRIMAL DUCT PROBE, PEC BLOCK performed by Ching Nielsen MD at [address], LAPAROSCOPIC      2008    COLONOSCOPY Left 9/17/2020    Dr Kvng Juárez hepatic flexure-Severe tortuosity with severe spasming, 1 yr recall    DIALYSIS FISTULA CREATION Left 1/25/2019    REVISION LEFT UPPER EXTREMITY AV ACCESS WITH LEFT BRACHIAL ARTERY TO LEFT AXILLARY VEIN WITH  INTERPOSITIONAL ARTEGRAFT   performed by Julia Gibbs MD at [address]  2012    [address] Right 1/25/2019    INSERTION OF RIGHT INTERNAL JUGULAR HEMODIALYSIS CATHETER performed by Julia Gibbs MD at [address] [facility]  08/17/2018    1.  Moderately increased stainable iron identified by special stain in Kupffer cells and occasional hepatocytes. Negative for evidence of significant portal or lobular inflammation. Negative for evidence of significant fibrosis    NE COLONOSCOPY W/BIOPSY SINGLE/MULTIPLE N/A 10/20/2017    Dr Aurelio Kohli prep-Tubular AP (-) dysplasia x 2--3 yr recall    NE EGD TRANSORAL BIOPSY SINGLE/MULTIPLE N/A 12/27/2016    Dr Brian Foley EGD TRANSORAL BIOPSY SINGLE/MULTIPLE N/A 10/20/2017    Dr Jj Vernon (-)   [facility] VASCULAR SURGERY Left 2016    fistula by Dr Hue Esqueda at [address]  10/02/2017    [facility]. Left upper fistulograms/venograms, balloon angioplasty cephalic vein arch [id number] conquest.    VASCULAR SURGERY  08/2018    FISTULOGRAM    VASCULAR SURGERY  10/29/2018    SJS. Left upper fistulograms/venograms    VASCULAR SURGERY  12/19/2018    SJS. Arch aortogram,left upper arteriograms.  VASCULAR SURGERY  03/06/2019    SJS. Removal of tunneled dialyis catheter right internal jugular vein.  VASCULAR SURGERY  08/28/2019    SJS. Left upper extremity fistulogram including venography to the superior vena cava. Balloon angioplasty left subclavian vein with 10mm x 40mm conquest balloon. Balloon angioplasty mid/proximal upper arm cephalic vein with 24UJ x 40mm conquest balloon. Venograms after balloon angioplasty. Stent left mid/proximal upper arm cephalic vein stenosis fluency 10mm x 60mm self-expanding covered stent. Balloon     VASCULAR SURGERY  2019    cont angioplasty stent with 10mm x 40mm conquest balloon. Completion venograms left upper extremity. Family History  Family History   Problem Relation Age of Onset    Colon Cancer Mother          at 63    Colon Polyps Mother     Lung Cancer Father          at 79    Colon Polyps Father     Stomach Cancer Sister     Breast Cancer Sister 27    Depression Daughter 25        committed suicide    Liver Cancer Neg Hx     Liver Disease Neg Hx     Esophageal Cancer Neg Hx     Rectal Cancer Neg Hx        Social History  Social History     Socioeconomic History    Marital status: Single     Spouse name: Not on file    Number of children: 2    Years of education: Not on file    Highest education level: Not on file   Occupational History    Not on file   Tobacco Use    Smoking status: Current Every Day Smoker     Packs/day: 0.50     Years: 33.00     Pack years: 16.50     Types: Cigarettes    Smokeless tobacco: Never Used    Tobacco comment: NOW at 1/4 PD, started at age 24 and has averaged 2 PPD   Vaping Use    Vaping Use: Never used   Substance and Sexual Activity    Alcohol use: No    Drug use: Not Currently     Types: Marijuana Juanetta Forrest)    Sexual activity: Not Currently     Partners: Male   Other Topics Concern    Not on file   Social History Narrative    Not on file     Social Determinants of Health     Financial Resource Strain:     Difficulty of Paying Living Expenses: Not on file   Food Insecurity:     Worried About Running Out of Food in the Last Year: Not on file    Kendy of Food in the Last Year: Not on file   Transportation Needs:     Lack of Transportation (Medical): Not on file    Lack of Transportation (Non-Medical):  Not on file   Physical Activity:     Days of Exercise per Week: Not on file    Minutes of Exercise per Session: Not on file   Stress:     Feeling of Stress : Not on file   Social Connections:     Frequency of Communication with Friends and Family: Not on file    Frequency of Social Gatherings with Friends and Family: Not on file    Attends Protestant Services: Not on file    Active Member of Clubs or Organizations: Not on file    Attends Club or Organization Meetings: Not on file    Marital Status: Not on file   Intimate Partner Violence:     Fear of Current or Ex-Partner: Not on file    Emotionally Abused: Not on file    Physically Abused: Not on file    Sexually Abused: Not on file   Housing Stability:     Unable to Pay for Housing in the Last Year: Not on file    Number of Places Lived in the Last Year: Not on file    Unstable Housing in the Last Year: Not on file         Review of Systems:  History obtained from unobtainable from patient due to mental status      Objective:  Patient Vitals for the past 24 hrs:   BP Temp Temp src Pulse Resp SpO2 Height Weight   11/19/21 0812     24 91 %     11/19/21 0600 (!) 145/56   65 15 (!) 89 %  148 lb 8 oz (67.4 kg)   11/19/21 0500 (!) 139/54   65 15 90 %     11/19/21 0400 (!) 121/45 97.2 °F (36.2 °C) Temporal 63 14 95 %     11/19/21 0300 133/60   60 16 98 %     11/19/21 0200 (!) 144/51   60 13 100 %     11/19/21 0100 (!) 136/54   60 11 100 %     11/19/21 0015    60       11/19/21 0000 (!) 153/131 98.5 °F (36.9 °C) Temporal 62 24 99 %     11/18/21 2300 (!) 138/53   61 14 99 %     11/18/21 2200 (!) 132/49   60 16 97 %     11/18/21 2100 (!) 151/56   66 13 98 %     11/18/21 2000 (!) 164/62 98.4 °F (36.9 °C) Temporal 68 17 98 %     11/18/21 1942     11 99 %     11/18/21 1900 (!) 152/60   69 23 98 %     11/18/21 1800 (!) 169/57   70 16 100 %     11/18/21 1730 (!) 169/71   70 16 100 %     11/18/21 1619  97.1 °F (36.2 °C) Temporal 75 29 100 % 5' 6\" (1.676 m) 152 lb 8 oz (69.2 kg)   11/18/21 1600 (!) 186/83   76 15 97 %     11/18/21 1530 (!) 195/75   80 15 99 %     11/18/21 1500 (!) 199/81   95 14 95 %     11/18/21 1415 (!) 201/69   93 (!) 33 97 %     11/18/21 1403 (!) 192/81 97.8 °F (36.6 °C) Temporal 94 (!) 33 98 %     11/18/21 1232 (!) 142/88   96 13 96 %     11/18/21 1212 (!) 161/83   98 13 100 %     11/18/21 1201 (!) 161/80   99 19 97 %     11/18/21 1151 (!) 178/93   100 19 98 %     11/18/21 1141 (!) 182/78   104 18 100 %     11/18/21 1102 (!) 195/81   95 16 99 %     11/18/21 1052 (!) 182/86   99 22 99 %     11/18/21 1021 (!) 239/89   96 21 92 %     11/18/21 1001 (!) 211/92   95  94 %     11/18/21 0951 (!) 206/111   97 20 93 %     11/18/21 0941 (!) 219/93   92 12 94 %     11/18/21 0931 (!) 224/99   95 17 96 %     11/18/21 0922 (!) 229/94   92 14 100 %     11/18/21 0912 (!) 247/113   91 21 97 %     11/18/21 0902 (!) 238/105   91 22 98 %     11/18/21 0852 (!) 234/95   94 23 90 %     11/18/21 0848 (!) 251/103   89 21 (!) 88 %         Intake/Output Summary (Last 24 hours) at 11/19/2021 0844  Last data filed at 11/18/2021 1813  Gross per 24 hour   Intake 215.83 ml   Output 3500 ml   Net -3284.17 ml     General: Ill-appearing and unable to dissipate history and physical examination  Chest:  some bibasilar crackles  CVS: regular rate and rhythm  Abdominal: soft, nontender, normal bowel sounds  Extremities: no cyanosis or edema  Skin: warm and dry without rash      Labs:  BMP:   Recent Labs     11/18/21  0633 11/18/21  0848 11/19/21 0137   *  --  134*   K 6.6* 5.6 5.5*   CL 88*  --  95*   CO2 22  --  23   BUN 60*  --  26*   CREATININE 6.8*  --  4.7*   CALCIUM 9.1  --  8.4*     CBC:   Recent Labs     11/18/21 0633 11/19/21 0137   WBC 5.9 4.5*   HGB 12.7 11.4*   HCT 40.3 37.6   MCV 93.7 94.9   * 106*     LIVER PROFILE:   Recent Labs     11/18/21  8030 11/19/21 0137   AST 23 12   ALT 12 8   BILITOT 0.4 0.4   ALKPHOS 151* 108*     PT/INR:   Recent Labs     11/18/21 0633   PROTIME 13.1   INR 0.97     APTT:   Recent Labs     11/18/21 0633   APTT 27.5     BNP:  No results for input(s): BNP in the last 72 hours. Ionized Calcium:No results for input(s): IONCA in the last 72 hours. Magnesium:  Recent Labs     11/19/21 0137   MG 2.5     Phosphorus:No results for input(s): PHOS in the last 72 hours. HgbA1C: No results for input(s): LABA1C in the last 72 hours. Hepatic:   Recent Labs     11/18/21 0633 11/19/21 0137   ALKPHOS 151* 108*   ALT 12 8   AST 23 12   PROT 7.1 5.6*   BILITOT 0.4 0.4   LABALBU 3.9 3.4*     Lactic Acid:   Recent Labs     11/19/21 0137   LACTA 0.8     Troponin: No results for input(s): CKTOTAL, CKMB, TROPONINT in the last 72 hours. ABGs: No results for input(s): PH, PCO2, PO2, HCO3, O2SAT in the last 72 hours. CRP:  No results for input(s): CRP in the last 72 hours. Sed Rate:  No results for input(s): SEDRATE in the last 72 hours. Cultures:   No results for input(s): CULTURE in the last 72 hours. No results for input(s): BC, Nell Karlos in the last 72 hours. No results for input(s): CXSURG in the last 72 hours. Radiology reports as per the Radiologist  Radiology: CT Head WO Contrast    Result Date: 11/18/2021  Examination. CT HEAD WO CONTRAST 11/18/2021 6:50 AM History: Stroke symptoms. Altered mental status. Left-sided weakness. DLP: 619 mGycm. The CT scan of the head is performed without intravenous contrast enhancement. The images are acquired in axial plane and subsequent reconstruction in coronal and sagittal planes. The comparison is made with the previous study dated 10/21/2020. There is no evidence of a mass. No midline shift. There is no intracranial hemorrhage or hematoma. Moderately dilated ventricles, basal cistern and the cortical sulci are similar to the previous study representing a chronic volume loss/atrophy.  The scattered areas of chronic white matter ischemia are seen in the centrum semiovale bilaterally. The gray-white matter differentiation is maintained. The images reviewed in bone window show no acute bony abnormality. The visualized paranasal sinuses and mastoid air cells are clear. No acute intracranial abnormality. The above results were immediately called to ER physician, Dr. Fidencio Colon. Signed by Dr Geoff Sahu    Result Date: 11/18/2021  Examination. XR CHEST PORTABLE 11/18/2021 7:15 AM History: Hypertension. Left-sided weakness. A frontal portable upright view of the chest is compared with the previous study dated 5/29/2021. There are persistent interstitial changes in the lungs bilaterally which may represent pulmonary vascular congestion or pulmonary edema. There is no pleural effusion. No pneumothorax. There is moderate fullness of the latha bilaterally which may represent pulmonary arterial hypertension. Heart size is not optimally evaluated due to portable projection. Left subclavian venous stent is seen in place. There is moderate diffuse osteopenia. Pulmonary vascular congestion/pulmonary edema. Signed by Dr Morgan  Date: 11/18/2021  Examination. CTA NECK W CONTRAST 11/18/2021 6:59 AM History: Stroke symptoms. DLP: 297 mGycm. The CT angiography of the neck is performed after intravenous contrast enhancement. The images are acquired in axial plane with subsequent 2-D reconstruction in coronal and sagittal planes and 3-D maximum intensity projection reconstruction. There is no previous similar study for comparison. Atheromatous changes of the limited visualized aortic arch is noted. Mild atheromatous changes of the origin of the brachiocephalic trunk is seen. No stenosis. It divides into normal-appearing right common carotid and subclavian arteries. Normal origin of the left common carotid artery seen.  A large calcific plaque is seen at the origin of the left subclavian artery with less than 50% focal stenosis at the origin. Incidentally noted is a left subclavian venous stent in place which is patent. Both common carotid arteries are patent and most of the course. There is a calcific plaque along the anterior aspect of the mid left common carotid artery without stenosis. There is a large plaque at the bifurcation of the left common carotid artery which extends posteriorly into the proximal carotid bulb with 50-69% stenosis of the carotid bulb. It extends into the proximal internal carotid artery with less than 50% stenosis of the left internal carotid artery. The remaining left internal carotid artery is normal in caliber with moderate tortuous course. There is a large plaque along the posterior right lateral aspect of the distal right common carotid artery which extends into the carotid bulb with without a significant stenosis of the bulb. Normal size moderately tortuous right internal carotid artery seen. No stenosis. There is a small calcific plaque at the origin of the right vertebral artery from the right subclavian vein. No significant stenosis. There is normal origin of the left vertebral artery. Normal vertebral arteries bilaterally is seen in the rest of the course in the neck. No focal stenosis or aneurysmal dilatation. Atheromatous changes of the common and proximal internal carotid arteries bilaterally with a moderate, 50-69% stenosis of the proximal left internal carotid artery. The remaining carotid and vertebral arteries are unremarkable as detailed above. The NASCET criteria was utilized for estimation of carotid arterial stenosis. Signed by Dr Schmidt Rad    Result Date: 11/18/2021  Examination. CTA HEAD W CONTRAST 11/18/2021 6:53 AM History: Stroke symptoms. DLP: 297 mGycm. The CT angiography of the head is performed after intravenous contrast enhancement.  The images are acquired in axial plane and subsequent 2-D reconstruction in coronal and sagittal planes and 3-D maximum intensity projection reconstruction. There is no previous study for comparison. The correlation is made with CT scan of the head obtained earlier today. Normal size internal carotid arteries are seen at the base of the skull, carotid canals and cavernous sinus bilaterally. There are atheromatous plaques in the internal carotid arteries bilaterally in the cavernous sinus without a significant stenosis. No aneurysmal dilatation. Normal size suprasellar internal carotid arteries bilaterally is seen subsequently dividing into normal-appearing anterior and middle cerebral arteries. There is subsequent normal insular and opercular branches. Normal size vertebral arteries enter the foramen magnum and join to make a normal-sized basilar artery which subsequently divides into normal-appearing posterior cerebral arteries bilaterally. Subsequent normal temporal and occipital branches. No areas of focal stenosis or aneurysmal dilatation. Normal CT angiography of the head. The NASCET criteria was utilized for estimation of carotid arterial stenosis. Signed by Dr Jero Mccurdyusing   ESRD  DM2 with nephropathy on long-term insulin  HTN  Hyponatremia  Hyperkalemia  Secondary Hyperparathyroidism  Anemia CKD  Seizure disorder  Hypothyroidism  Acute hypoxemic respiratory failure  Acute pulmonary edema  And acute metabolic encephalopathy    Plan:  Discussed with patient as able, nursing  Will plan additional dialysis treatment today for hyperkalemia  Blood pressure trends improving  Monitor labs      Thank you for the consult, we appreciate the opportunity to provide care to your patients. Feel free to contact me if I can be of any further assistance.       Santosh Farley MD  11/19/21  8:44 AM

## 2021-11-19 NOTE — PLAN OF CARE
Problem: Skin Integrity:  Goal: Will show no infection signs and symptoms  Description: Will show no infection signs and symptoms  Outcome: Ongoing  Goal: Absence of new skin breakdown  Description: Absence of new skin breakdown  Outcome: Ongoing  Goal: Status of oral mucous membranes will improve  Description: Status of oral mucous membranes will improve  Outcome: Ongoing  Goal: Skin integrity will be maintained  Description: Skin integrity will be maintained  Outcome: Ongoing     Problem: Falls - Risk of:  Goal: Will remain free from falls  Description: Will remain free from falls  Outcome: Ongoing  Goal: Absence of physical injury  Description: Absence of physical injury  Outcome: Ongoing     Problem:  Activity:  Goal: Fatigue will decrease  Description: Fatigue will decrease  Outcome: Ongoing  Goal: Risk for activity intolerance will decrease  Description: Risk for activity intolerance will decrease  Outcome: Ongoing     Problem: Coping:  Goal: Ability to cope will improve  Description: Ability to cope will improve  Outcome: Ongoing     Problem: Fluid Volume:  Goal: Will show no signs or symptoms of fluid imbalance  Description: Will show no signs or symptoms of fluid imbalance  Outcome: Ongoing     Problem: Health Behavior:  Goal: Ability to manage health-related needs will improve  Description: Ability to manage health-related needs will improve  Outcome: Ongoing  Goal: Identification of resources available to assist in meeting health care needs will improve  Description: Identification of resources available to assist in meeting health care needs will improve  Outcome: Ongoing     Problem: Nutritional:  Goal: Ability to identify appropriate dietary choices will improve  Description: Ability to identify appropriate dietary choices will improve  Outcome: Ongoing     Problem: Physical Regulation:  Goal: Ability to maintain clinical measurements within normal limits will improve  Description: Ability to maintain clinical measurements within normal limits will improve  Outcome: Ongoing  Goal: Complications related to the disease process, condition or treatment will be avoided or minimized  Description: Complications related to the disease process, condition or treatment will be avoided or minimized  Outcome: Ongoing     Problem: Sensory:  Goal: General experience of comfort will improve  Description: General experience of comfort will improve  Outcome: Ongoing     Problem: Self-Care:  Goal: Ability to participate in self-care as condition permits will improve  Description: Ability to participate in self-care as condition permits will improve  Outcome: Ongoing

## 2021-11-20 PROBLEM — R53.1 GENERALIZED WEAKNESS: Status: ACTIVE | Noted: 2021-11-20

## 2021-11-20 LAB
ALBUMIN SERPL-MCNC: 3.5 G/DL (ref 3.5–5.2)
ALP BLD-CCNC: 109 U/L (ref 35–104)
ALT SERPL-CCNC: 7 U/L (ref 5–33)
ANION GAP SERPL CALCULATED.3IONS-SCNC: 13 MMOL/L (ref 7–19)
AST SERPL-CCNC: 12 U/L (ref 5–32)
BASOPHILS ABSOLUTE: 0.1 K/UL (ref 0–0.2)
BASOPHILS RELATIVE PERCENT: 2.4 % (ref 0–1)
BILIRUB SERPL-MCNC: 0.4 MG/DL (ref 0.2–1.2)
BUN BLDV-MCNC: 15 MG/DL (ref 6–20)
CALCIUM SERPL-MCNC: 8.4 MG/DL (ref 8.6–10)
CHLORIDE BLD-SCNC: 95 MMOL/L (ref 98–111)
CO2: 27 MMOL/L (ref 22–29)
CREAT SERPL-MCNC: 3.2 MG/DL (ref 0.5–0.9)
EKG P AXIS: 79 DEGREES
EKG P-R INTERVAL: 178 MS
EKG Q-T INTERVAL: 432 MS
EKG QRS DURATION: 98 MS
EKG QTC CALCULATION (BAZETT): 467 MS
EKG T AXIS: 81 DEGREES
EOSINOPHILS ABSOLUTE: 0.2 K/UL (ref 0–0.6)
EOSINOPHILS RELATIVE PERCENT: 3.9 % (ref 0–5)
GFR AFRICAN AMERICAN: 18
GFR NON-AFRICAN AMERICAN: 15
GLUCOSE BLD-MCNC: 117 MG/DL (ref 70–99)
GLUCOSE BLD-MCNC: 201 MG/DL (ref 74–109)
GLUCOSE BLD-MCNC: 248 MG/DL (ref 70–99)
GLUCOSE BLD-MCNC: 288 MG/DL (ref 70–99)
HCT VFR BLD CALC: 35.2 % (ref 37–47)
HEMOGLOBIN: 10.5 G/DL (ref 12–16)
IMMATURE GRANULOCYTES #: 0 K/UL
LYMPHOCYTES ABSOLUTE: 1 K/UL (ref 1.1–4.5)
LYMPHOCYTES RELATIVE PERCENT: 23 % (ref 20–40)
MAGNESIUM: 2.2 MG/DL (ref 1.6–2.6)
MCH RBC QN AUTO: 29.1 PG (ref 27–31)
MCHC RBC AUTO-ENTMCNC: 29.8 G/DL (ref 33–37)
MCV RBC AUTO: 97.5 FL (ref 81–99)
MONOCYTES ABSOLUTE: 0.4 K/UL (ref 0–0.9)
MONOCYTES RELATIVE PERCENT: 10.7 % (ref 0–10)
NEUTROPHILS ABSOLUTE: 2.5 K/UL (ref 1.5–7.5)
NEUTROPHILS RELATIVE PERCENT: 60 % (ref 50–65)
PDW BLD-RTO: 15.9 % (ref 11.5–14.5)
PERFORMED ON: ABNORMAL
PLATELET # BLD: 117 K/UL (ref 130–400)
PMV BLD AUTO: 10.3 FL (ref 9.4–12.3)
POTASSIUM SERPL-SCNC: 4.3 MMOL/L (ref 3.5–5)
RBC # BLD: 3.61 M/UL (ref 4.2–5.4)
SODIUM BLD-SCNC: 135 MMOL/L (ref 136–145)
TOTAL PROTEIN: 5.8 G/DL (ref 6.6–8.7)
WBC # BLD: 4.1 K/UL (ref 4.8–10.8)

## 2021-11-20 PROCEDURE — 6360000002 HC RX W HCPCS: Performed by: NURSE PRACTITIONER

## 2021-11-20 PROCEDURE — 8010000000 HC HEMODIALYSIS ACUTE INPT

## 2021-11-20 PROCEDURE — 6360000002 HC RX W HCPCS: Performed by: INTERNAL MEDICINE

## 2021-11-20 PROCEDURE — 6370000000 HC RX 637 (ALT 250 FOR IP): Performed by: INTERNAL MEDICINE

## 2021-11-20 PROCEDURE — 85025 COMPLETE CBC W/AUTO DIFF WBC: CPT

## 2021-11-20 PROCEDURE — 93010 ELECTROCARDIOGRAM REPORT: CPT | Performed by: INTERNAL MEDICINE

## 2021-11-20 PROCEDURE — 83735 ASSAY OF MAGNESIUM: CPT

## 2021-11-20 PROCEDURE — 80053 COMPREHEN METABOLIC PANEL: CPT

## 2021-11-20 PROCEDURE — 1210000000 HC MED SURG R&B

## 2021-11-20 PROCEDURE — 36415 COLL VENOUS BLD VENIPUNCTURE: CPT

## 2021-11-20 PROCEDURE — 82947 ASSAY GLUCOSE BLOOD QUANT: CPT

## 2021-11-20 RX ORDER — ONDANSETRON 2 MG/ML
4 INJECTION INTRAMUSCULAR; INTRAVENOUS EVERY 6 HOURS PRN
Status: DISCONTINUED | OUTPATIENT
Start: 2021-11-20 | End: 2021-11-25 | Stop reason: HOSPADM

## 2021-11-20 RX ADMIN — ACETAMINOPHEN 650 MG: 325 TABLET ORAL at 21:15

## 2021-11-20 RX ADMIN — SEVELAMER CARBONATE 800 MG: 800 TABLET, FILM COATED ORAL at 14:08

## 2021-11-20 RX ADMIN — SENNOSIDES 8.6 MG: 8.6 TABLET, FILM COATED ORAL at 08:44

## 2021-11-20 RX ADMIN — CLONIDINE HYDROCHLORIDE 0.3 MG: 0.1 TABLET ORAL at 05:35

## 2021-11-20 RX ADMIN — HYDROCHLOROTHIAZIDE 50 MG: 25 TABLET ORAL at 08:45

## 2021-11-20 RX ADMIN — ONDANSETRON 4 MG: 2 INJECTION INTRAMUSCULAR; INTRAVENOUS at 11:50

## 2021-11-20 RX ADMIN — INSULIN LISPRO 2 UNITS: 100 INJECTION, SOLUTION INTRAVENOUS; SUBCUTANEOUS at 17:31

## 2021-11-20 RX ADMIN — INSULIN GLARGINE 10 UNITS: 100 INJECTION, SOLUTION SUBCUTANEOUS at 20:19

## 2021-11-20 RX ADMIN — CLONIDINE HYDROCHLORIDE 0.3 MG: 0.1 TABLET ORAL at 14:08

## 2021-11-20 RX ADMIN — INSULIN LISPRO 2 UNITS: 100 INJECTION, SOLUTION INTRAVENOUS; SUBCUTANEOUS at 20:18

## 2021-11-20 RX ADMIN — SEVELAMER CARBONATE 800 MG: 800 TABLET, FILM COATED ORAL at 08:44

## 2021-11-20 RX ADMIN — HYDRALAZINE HYDROCHLORIDE 75 MG: 25 TABLET, FILM COATED ORAL at 14:07

## 2021-11-20 RX ADMIN — HEPARIN SODIUM 5000 UNITS: 5000 INJECTION INTRAVENOUS; SUBCUTANEOUS at 14:07

## 2021-11-20 RX ADMIN — ACETAMINOPHEN 650 MG: 325 TABLET ORAL at 11:50

## 2021-11-20 RX ADMIN — NIFEDIPINE 60 MG: 60 TABLET, EXTENDED RELEASE ORAL at 08:44

## 2021-11-20 RX ADMIN — ATORVASTATIN CALCIUM 20 MG: 40 TABLET, FILM COATED ORAL at 08:45

## 2021-11-20 RX ADMIN — DULOXETINE 60 MG: 60 CAPSULE, DELAYED RELEASE ORAL at 08:44

## 2021-11-20 RX ADMIN — HYDRALAZINE HYDROCHLORIDE 75 MG: 25 TABLET, FILM COATED ORAL at 06:23

## 2021-11-20 RX ADMIN — ISOSORBIDE MONONITRATE 120 MG: 60 TABLET, EXTENDED RELEASE ORAL at 08:44

## 2021-11-20 RX ADMIN — LEVOTHYROXINE SODIUM 150 MCG: 75 TABLET ORAL at 06:23

## 2021-11-20 RX ADMIN — HEPARIN SODIUM 5000 UNITS: 5000 INJECTION INTRAVENOUS; SUBCUTANEOUS at 21:19

## 2021-11-20 RX ADMIN — SEVELAMER CARBONATE 800 MG: 800 TABLET, FILM COATED ORAL at 17:28

## 2021-11-20 RX ADMIN — METOPROLOL SUCCINATE 100 MG: 50 TABLET, EXTENDED RELEASE ORAL at 08:45

## 2021-11-20 RX ADMIN — ROPINIROLE HYDROCHLORIDE 4 MG: 4 TABLET, FILM COATED ORAL at 20:18

## 2021-11-20 ASSESSMENT — ENCOUNTER SYMPTOMS
NAUSEA: 0
DIARRHEA: 0
BLOOD IN STOOL: 0
ABDOMINAL PAIN: 0
CONSTIPATION: 0
ABDOMINAL DISTENTION: 0
VOMITING: 0
WHEEZING: 0
SINUS PAIN: 0
SORE THROAT: 0
COUGH: 0
TROUBLE SWALLOWING: 0
SHORTNESS OF BREATH: 0

## 2021-11-20 ASSESSMENT — PAIN SCALES - GENERAL
PAINLEVEL_OUTOF10: 8
PAINLEVEL_OUTOF10: 8

## 2021-11-20 NOTE — PLAN OF CARE
Problem: Skin Integrity:  Goal: Will show no infection signs and symptoms  Description: Will show no infection signs and symptoms  11/20/2021 0019 by Opal Kaplan RN  Outcome: Ongoing  11/19/2021 1719 by Ivory Anaya RN  Outcome: Ongoing  Goal: Absence of new skin breakdown  Description: Absence of new skin breakdown  11/20/2021 0019 by Opal Kaplan RN  Outcome: Ongoing  11/19/2021 1719 by Ivory Anaya RN  Outcome: Ongoing  Goal: Status of oral mucous membranes will improve  Description: Status of oral mucous membranes will improve  11/20/2021 0019 by Opal Kaplan RN  Outcome: Ongoing  11/19/2021 1719 by Ivory Anaya RN  Outcome: Ongoing  Goal: Skin integrity will be maintained  Description: Skin integrity will be maintained  11/20/2021 0019 by Opal Kaplan RN  Outcome: Ongoing  11/19/2021 1719 by Ivory Anaya RN  Outcome: Ongoing     Problem: Falls - Risk of:  Goal: Will remain free from falls  Description: Will remain free from falls  11/20/2021 0019 by Opal Kaplan RN  Outcome: Ongoing  11/19/2021 1719 by Ivory Anaya RN  Outcome: Ongoing  Goal: Absence of physical injury  Description: Absence of physical injury  11/20/2021 0019 by Opal Kaplan RN  Outcome: Ongoing  11/19/2021 1719 by Ivory Anaya RN  Outcome: Ongoing     Problem:  Activity:  Goal: Fatigue will decrease  Description: Fatigue will decrease  11/20/2021 0019 by Opal Kaplan RN  Outcome: Ongoing  11/19/2021 1719 by Ivory Anaya RN  Outcome: Ongoing  Goal: Risk for activity intolerance will decrease  Description: Risk for activity intolerance will decrease  11/20/2021 0019 by Opal Kaplan RN  Outcome: Ongoing  11/19/2021 1719 by Ivory Anaya RN  Outcome: Ongoing     Problem: Coping:  Goal: Ability to cope will improve  Description: Ability to cope will improve  11/20/2021 0019 by Opal Kaplan RN  Outcome: Ongoing  11/19/2021 1719 by Ivory Anaya RN  Outcome: Ongoing Smith Mccloud RN  Outcome: Ongoing  11/19/2021 1719 by Enoch Ryder RN  Outcome: Ongoing     Problem: Self-Care:  Goal: Ability to participate in self-care as condition permits will improve  Description: Ability to participate in self-care as condition permits will improve  11/20/2021 0019 by Smith Mccloud RN  Outcome: Ongoing  11/19/2021 1719 by Enoch Ryder, RN  Outcome: Ongoing

## 2021-11-20 NOTE — PROGRESS NOTES
[facility]      [facility] Note    Patient:  Flavio Muñiz  YOB: 1970  Date of Service: 11/20/2021  MRN: [id number]   Acct: [de-identified]   Primary Care Physician: ROMINA Izaguirre  Advance Directive: Full Code  Admit Date: 11/18/2021       Hospital Day: 2    Portions of this note have been copied forward, however, updated to reflect the most current clinical status of this patient. CHIEF COMPLAINT altered mental status    SUBJECTIVE: Ms. Shaista Schultz was resting comfortably in bed this morning. Reports headache and nausea this morning. Denies chest pain, shortness of breath, fever, or chills. Currently alert and oriented x3. Reports generalized weakness. Uses walker at home. Discussed PT/OT and  discharge options patient states she wants to approach SNF. CUMULATIVE HOSPITAL COURSE:   The patient is a 61year old female with past medical history of ESRD, DM, hypertension and neuropathy, who presented to [address] [place] [address] ED from dialysis center with complaints of altered mental status and left-sided weakness. Last well-known night prior to admission. Patient was unable to provide HPI information on arrival.  HPI obtained from review of chart. Dialysis staff reported patient having altered mental status. Patient was moving all four extremities on admission but remained confused. Patient was admitted to ICU for hypertensive emergency, started on Cardene drip. Nephrology was consulted to manage ESRD. Patient was weaned off Cardizem drip and transferred to floor on 11/19/2021. Blood pressure remained mildly elevated on current medication. Discussed PT/OT and discharge options, patient states she wants to approach life due to generalized weakness and neuropathy. Review of Systems   Constitutional: Negative for chills, diaphoresis, fatigue and fever. HENT: Negative for congestion, ear pain, sinus pain, sore throat and trouble swallowing. Eyes: Negative for visual disturbance. Respiratory: Negative for cough, shortness of breath and wheezing. Cardiovascular: Negative for chest pain, palpitations and leg swelling. Gastrointestinal: Negative for abdominal distention, abdominal pain, blood in stool, constipation, diarrhea, nausea and vomiting. Endocrine: Negative for cold intolerance and heat intolerance. Genitourinary: Negative for difficulty urinating, flank pain, frequency and urgency. Musculoskeletal: Negative for arthralgias and myalgias. Neurological: Positive for weakness and headaches. Negative for dizziness, syncope, light-headedness and numbness. Reports generalized weakness    Hematological: Does not bruise/bleed easily. Psychiatric/Behavioral: Negative for agitation, confusion and dysphoric mood. Objective:   VITALS:  BP (!) 152/68   Pulse 78   Temp 96.5 °F (35.8 °C) (Temporal)   Resp 18   Ht 5' 6\" (1.676 m)   Wt 148 lb 8 oz (67.4 kg)   SpO2 98%   BMI 23.97 kg/m²   24HR INTAKE/OUTPUT:      Intake/Output Summary (Last 24 hours) at 11/20/2021 1820  Last data filed at 11/20/2021 1804  Gross per 24 hour   Intake 870 ml   Output 200 ml   Net 670 ml       Physical Exam  Constitutional:       General: She is not in acute distress. Appearance: Normal appearance. She is not toxic-appearing or diaphoretic. HENT:      Head: Normocephalic and atraumatic. Right Ear: External ear normal.      Left Ear: External ear normal.      Nose: Nose normal. No congestion or rhinorrhea. Mouth/Throat:      Mouth: Mucous membranes are moist.      Pharynx: Oropharynx is clear. Eyes:      General: No scleral icterus. Extraocular Movements: Extraocular movements intact. Conjunctiva/sclera: Conjunctivae normal.   Cardiovascular:      Rate and Rhythm: Normal rate and regular rhythm. Pulses: Normal pulses. Heart sounds: Murmur heard. No friction rub. No gallop.     Pulmonary:      Effort: Pulmonary effort is normal. No respiratory distress. Breath sounds: Normal breath sounds. No wheezing, rhonchi or rales. Abdominal:      General: Abdomen is flat. Bowel sounds are normal. There is no distension. Palpations: Abdomen is soft. Tenderness: There is no abdominal tenderness. Musculoskeletal:         General: No swelling. Normal range of motion. Cervical back: Normal range of motion and neck supple. Right lower leg: No edema. Left lower leg: No edema. Skin:     General: Skin is warm and dry. Coloration: Skin is not jaundiced. Findings: No erythema, lesion or rash. Neurological:      General: No focal deficit present. Mental Status: She is alert and oriented to person, place, and time. Mental status is at baseline. Cranial Nerves: No cranial nerve deficit. Sensory: No sensory deficit. Motor: No weakness. Comments: No weakness noted on exam   Psychiatric:         Mood and Affect: Mood normal.         Behavior: Behavior normal.         Thought Content:  Thought content normal.         Judgment: Judgment normal.            Medications:      dextrose        heparin (porcine)  5,000 Units SubCUTAneous 3 times per day    cloNIDine  0.3 mg Oral 3 times per day    DULoxetine  60 mg Oral Daily    hydrALAZINE  75 mg Oral 3 times per day    hydroCHLOROthiazide  50 mg Oral Daily    insulin glargine  10 Units SubCUTAneous Nightly    insulin lispro  0-6 Units SubCUTAneous TID WC    insulin lispro  0-3 Units SubCUTAneous Nightly    isosorbide mononitrate  120 mg Oral Daily    levothyroxine  150 mcg Oral Daily    metoprolol succinate  100 mg Oral QAM AC    NIFEdipine  60 mg Oral Daily    rOPINIRole  4 mg Oral Nightly    senna  1 tablet Oral Daily    sevelamer  800 mg Oral TID WC    atorvastatin  20 mg Oral Daily    [Held by provider] lisinopril  20 mg Oral BID    insulin regular  10 Units IntraVENous Once     ondansetron, albuterol sulfate HFA, HYDROcodone-acetaminophen, hydrALAZINE, labetalol, acetaminophen **OR** acetaminophen, glucose, dextrose, glucagon (rDNA), dextrose  ADULT DIET; Dysphagia - Soft and Bite Sized; 3 carb choices (45 gm/meal); Low Potassium (Less than 3000 mg/day)     Lab and other Data:     Recent Labs     11/18/21  0633 11/19/21 0137 11/20/21 0133   WBC 5.9 4.5* 4.1*   HGB 12.7 11.4* 10.5*   * 106* 117*     Recent Labs     11/18/21  0633 11/18/21  0633 11/18/21  0649 11/18/21  0848 11/19/21 0137 11/20/21 0133   *  --   --   --  134* 135*   K 6.6*  --   --  5.6 5.5* 4.3   CL 88*  --   --   --  95* 95*   CO2 22  --   --   --  23 27   BUN 60*  --   --   --  26* 15   CREATININE 6.8*  --   --   --  4.7* 3.2*   GLUCOSE 381*   < > 332  --  139* 201*    < > = values in this interval not displayed. Recent Labs     11/18/21  0633 11/19/21 0137 11/20/21 0133   AST 23 12 12   ALT 12 8 7   BILITOT 0.4 0.4 0.4   ALKPHOS 151* 108* 109*     Troponin T:   Recent Labs     11/18/21 0633   TROPONINI 0.14*     INR:   Recent Labs     11/18/21  0633   INR 0.97     ABG:  Recent Labs     11/18/21  0848   PHART 7.350   BLU2CSG 44.0   PO2ART 55.0*   IHN6MRG 24.3   BEART -1.5   HGBAE 12.5   F8QJHHTK 87.9*   CARBOXHGBART 1.9       RAD:     CT Head WO Contrast  Result Date: 11/18/2021    No acute intracranial abnormality. The above results were immediately called to ER physician, Dr. Jesús Wall. Signed by Dr Julieta Melendez  Result Date: 11/18/2021    Pulmonary vascular congestion/pulmonary edema. Signed by Dr Brook Whatley  Result Date: 11/18/2021    Atheromatous changes of the common and proximal internal carotid arteries bilaterally with a moderate, 50-69% stenosis of the proximal left internal carotid artery. The remaining carotid and vertebral arteries are unremarkable as detailed above. The NASCET criteria was utilized for estimation of carotid arterial stenosis.  Signed by Dr Susi Allred CONTRAST  Result Date: 11/18/2021    Normal CT angiography of the head. The NASCET criteria was utilized for estimation of carotid arterial stenosis. Signed by Dr Ashtyn Hernandez:    Maral Early- negative       Assessment/Plan   Principal Problem:    Hypertensive emergency  Active Problems:    Type 2 diabetes mellitus with chronic kidney disease on chronic dialysis, with long-term current use of insulin ([facility])    Acute encephalopathy    ESRD (end stage renal disease) on dialysis [facility])    Palliative care patient    Generalized weakness  Resolved Problems:    * No resolved hospital problems. *      Principal Problem:    Hypertensive emergency-    - Cardene gtt weaned off on 11/19/2021   - Blood pressure mildly elevated on current medications   - As needed hydralazine, Labetalol    - Continue home medications   - Ensure adequate cerebral perfusion pressure        Active Problems:    Type 2 diabetes mellitus with chronic kidney disease on chronic dialysis, with long-term current use of insulin ([address]-    - Lantus, SSI, Accu-Chek, hypoglycemia treatment protocol in place      Acute encephalopathy possibly secondary to hypertensive emergency   - improving   -CT imaging negative for acute process   -Motor function remains intact   - Neurochecks    ESRD (end stage renal disease) on dialysis [facility])- nephrology managing HD      Palliative care patient- noted       Generalized weakness- noted, PT/OT, case management consultation for SNF placement    Tobacco dependence- counseled          DVT Prophylaxis: Heparin    Discharge planning: Patient considering SNF      Further Orders per Clinical course/attending. Electronically signed by ROMINA Hoffmann CNP on 11/20/2021 at 6:20 PM       EMR Dragon/Transcription disclaimer:   Much of this encounter note is an electronic transcription/translation of spoken language to printed text.  The electronic translation of spoken language may permit erroneous, or at times, nonsensical words or phrases to be inadvertently transcribed; although attempts have made to review the note for such errors, some may still exist.

## 2021-11-20 NOTE — PROGRESS NOTES
Nephrology ([facility]) Consult Note      Patient:  Trevon Pelaez  YOB: 1970  Date of Service: 11/20/2021  MRN: [id number]   Acct: [de-identified]   Primary Care Physician: ROMINA Lewis  Advance Directive: Full Code  Admit Date: 11/18/2021       Hospital Day: 2  Referring Provider: Connor Ly MD    Patient independently seen and examined, Chart, Consults, Notes, Operative notes, Labs, Cardiology, and Radiology studies reviewed as available. Subjective:  Trevon Pelaez is a 45 y.o. female for whom we were consulted for evaluation and treatment of end-stage renal disease. Patient has a Tuesday Thursday Saturday fashion at the [facility] dialysis clinic. She presented in transfer from the clinic with altered mental status and hypotension. She was found to have left-sided weakness and blood pressures over systolics of 702. She was admitted to the ICU for further evaluation and treatment. On initial examination, she was not answering questions appropriately and therefore unable to fully participate in history and physical examination. History taken in chart review and discussion from Dr. JUÁREZ [facility] of the emergency room Dr. Charlie Rod of the hospital service. Last recorded well time was 2330 on 11/17. Today, no overnight events. She has moved out of the ICU. She was lethargic but more arousable per nursing.   No cp/soa/n/v.    Dialysis   Pt was seen on renal replacement therapy and I have personally seen and evaluated the patient and directed the therapy  Modality: Hemodialysis at 1330  Access: Arterial Venous Fistula  Location: left arm  QB: 600  QD: 700  UF: 710      Allergies:  Penicillins, Lamisil advanced [terbinafine], Stadol [butorphanol], and Reglan [metoclopramide]    Medicines:  Current Facility-Administered Medications   Medication Dose Route Frequency Provider Last Rate Last Admin    ondansetron (ZOFRAN) injection 4 mg  4 mg IntraVENous Q6H PRN ROMINA Love - CNP        heparin (porcine) injection 5,000 Units  5,000 Units SubCUTAneous 3 times per day Caitlin Chang MD        albuterol sulfate  (90 Base) MCG/ACT inhaler 2 puff  2 puff Inhalation Q4H PRN Caitlin Chang MD        cloNIDine (CATAPRES) tablet 0.3 mg  0.3 mg Oral 3 times per day Caitlin Chang MD   0.3 mg at 11/20/21 0535    DULoxetine (CYMBALTA) extended release capsule 60 mg  60 mg Oral Daily Caitlin Chang MD   60 mg at 11/20/21 0844    hydrALAZINE (APRESOLINE) tablet 75 mg  75 mg Oral 3 times per day Caitlin Chang MD   75 mg at 11/20/21 4979    hydroCHLOROthiazide (HYDRODIURIL) tablet 50 mg  50 mg Oral Daily Caitlin Chang MD   50 mg at 11/20/21 0845    HYDROcodone-acetaminophen (Orvel Meckel) 5-325 MG per tablet 1 tablet  1 tablet Oral Q6H PRN Caitlin Chang MD        insulin glargine (LANTUS) injection vial 10 Units  10 Units SubCUTAneous Nightly Caitlin Chang MD   10 Units at 11/19/21 2032    hydrALAZINE (APRESOLINE) injection 10 mg  10 mg IntraVENous Q6H PRN Caitlin Chang MD   10 mg at 11/18/21 1436    labetalol (NORMODYNE;TRANDATE) injection 20 mg  20 mg IntraVENous Q4H PRN Caitlin Chang MD        insulin lispro (HUMALOG) injection vial 0-6 Units  0-6 Units SubCUTAneous TID WC Caitlin Chang MD   2 Units at 11/19/21 0915    insulin lispro (HUMALOG) injection vial 0-3 Units  0-3 Units SubCUTAneous Nightly Caitlin Chang MD   1 Units at 11/19/21 2032    isosorbide mononitrate (IMDUR) extended release tablet 120 mg  120 mg Oral Daily Caitlin Chang MD   120 mg at 11/20/21 0844    levothyroxine (SYNTHROID) tablet 150 mcg  150 mcg Oral Daily Caitlin Chang MD   150 mcg at 11/20/21 3242    metoprolol succinate (TOPROL XL) extended release tablet 100 mg  100 mg Oral QAM AC Caitlin Chang MD   100 mg at 11/20/21 0845    NIFEdipine (PROCARDIA XL) extended release tablet 60 mg  60 mg Oral Daily Caitlin Chang MD   60 mg at 11/20/21 0844    rOPINIRole EXCISION RIGHT BREAST DUCT WITH LACRIMAL DUCT PROBE, PEC BLOCK performed by Tashi Monroe MD at [address], [facility]      2008    COLONOSCOPY Left 9/17/2020    Dr Fatimah Montoya hepatic flexure-Severe tortuosity with severe spasming, 1 yr recall    DIALYSIS FISTULA CREATION Left 1/25/2019    REVISION LEFT UPPER EXTREMITY AV ACCESS WITH LEFT BRACHIAL ARTERY TO LEFT AXILLARY VEIN WITH  INTERPOSITIONAL ARTEGRAFT   performed by Jose Johnson MD at [address]  2012    [address] Right 1/25/2019    INSERTION OF RIGHT INTERNAL JUGULAR HEMODIALYSIS CATHETER performed by Jose Johnson MD at [address] [facility]  08/17/2018    1.  Moderately increased stainable iron identified by special stain in Kupffer cells and occasional hepatocytes. Negative for evidence of significant portal or lobular inflammation. Negative for evidence of significant fibrosis    KS COLONOSCOPY W/BIOPSY SINGLE/MULTIPLE N/A 10/20/2017    Dr Allison Herrera prep-Tubular AP (-) dysplasia x 2--3 yr recall    KS EGD TRANSORAL BIOPSY SINGLE/MULTIPLE N/A 12/27/2016    Dr Emanuel Lemus EGD TRANSORAL BIOPSY SINGLE/MULTIPLE N/A 10/20/2017    Dr Allyson Hammond (-)   [address] [facility] VASCULAR SURGERY Left 2016    fistula by Dr Nelida Ontiveros at [address]  10/02/2017    [facility]. Left upper fistulograms/venograms, balloon angioplasty cephalic vein arch [id number] conquest.    VASCULAR SURGERY  08/2018    FISTULOGRAM    VASCULAR SURGERY  10/29/2018    [facility]. Left upper fistulograms/venograms    VASCULAR SURGERY  12/19/2018    [facility]. Arch aortogram,left upper arteriograms.  VASCULAR SURGERY  03/06/2019    [facility]. Removal of tunneled dialyis catheter right internal jugular vein.  VASCULAR SURGERY  08/28/2019    [facility]. Left upper extremity fistulogram including venography to the superior vena cava. Balloon angioplasty left subclavian vein with 10mm x 40mm conquest balloon. Balloon angioplasty mid/proximal upper arm cephalic vein with 46ZV x 40mm conquest balloon. Venograms after balloon angioplasty. Stent left mid/proximal upper arm cephalic vein stenosis fluency 10mm x 60mm self-expanding covered stent. Balloon     VASCULAR SURGERY  2019    cont angioplasty stent with 10mm x 40mm conquest balloon. Completion venograms left upper extremity. Family History  Family History   Problem Relation Age of Onset    Colon Cancer Mother          at 63    Colon Polyps Mother     Lung Cancer Father          at 79    Colon Polyps Father     Stomach Cancer Sister     Breast Cancer Sister 27    Depression Daughter 25        committed suicide    Liver Cancer Neg Hx     Liver Disease Neg Hx     Esophageal Cancer Neg Hx     Rectal Cancer Neg Hx        Social History  Social History     Socioeconomic History    Marital status: Single     Spouse name: Not on file    Number of children: 2    Years of education: Not on file    Highest education level: Not on file   Occupational History    Not on file   Tobacco Use    Smoking status: Current Every Day Smoker     Packs/day: 0.50     Years: 33.00     Pack years: 16.50     Types: Cigarettes    Smokeless tobacco: Never Used    Tobacco comment: NOW at 1/4 PD, started at age 24 and has averaged 2 PPD   Vaping Use    Vaping Use: Never used   Substance and Sexual Activity    Alcohol use: No    Drug use: Not Currently     Types: Marijuana Kale Kos)    Sexual activity: Not Currently     Partners: Male   Other Topics Concern    Not on file   Social History Narrative    Not on file     Social Determinants of Health     Financial Resource Strain:     Difficulty of Paying Living Expenses: Not on file   Food Insecurity:     Worried About Running Out of Food in the Last Year: Not on file    Kendy of Food in the Last Year: Not on file   Transportation Needs:     Lack of Transportation (Medical):  Not on file    Lack of Transportation (Non-Medical): Not on file   Physical Activity:     Days of Exercise per Week: Not on file    Minutes of Exercise per Session: Not on file   Stress:     Feeling of Stress : Not on file   Social Connections:     Frequency of Communication with Friends and Family: Not on file    Frequency of Social Gatherings with Friends and Family: Not on file    Attends Christianity Services: Not on file    Active Member of Clubs or Organizations: Not on file    Attends Club or Organization Meetings: Not on file    Marital Status: Not on file   Intimate Partner Violence:     Fear of Current or Ex-Partner: Not on file    Emotionally Abused: Not on file    Physically Abused: Not on file    Sexually Abused: Not on file   Housing Stability:     Unable to Pay for Housing in the Last Year: Not on file    Number of Places Lived in the Last Year: Not on file    Unstable Housing in the Last Year: Not on file         Review of Systems:  History obtained from chart review and the patient  General ROS: No fever or chills  Respiratory ROS: No cough, shortness of breath, wheezing  Cardiovascular ROS: no chest pain or dyspnea on exertion  Gastrointestinal ROS: No abdominal pain or melena  Genito-Urinary ROS: No dysuria or hematuria  Musculoskeletal ROS: No joint pain or swelling         Objective:  Patient Vitals for the past 24 hrs:   BP Temp Temp src Pulse Resp SpO2   11/20/21 0615 (!) 157/63   57  97 %   11/20/21 0530 (!) 159/65   62 16 94 %   11/19/21 2328 (!) 163/67 98.1 °F (36.7 °C) Temporal 65 16 93 %   11/19/21 2115 (!) 149/66   72 16 96 %   11/19/21 1842     16 96 %   11/19/21 1713 133/60 97.5 °F (36.4 °C) Temporal 80 18 98 %   11/19/21 1200 (!) 154/57 98 °F (36.7 °C) Temporal 63 18 98 %   11/19/21 1100 (!) 143/54   63 15 98 %       Intake/Output Summary (Last 24 hours) at 11/20/2021 1049  Last data filed at 11/20/2021 0635  Gross per 24 hour   Intake 60 ml   Output 3200 ml   Net -3140 ml     General: Ill-appearing and lethargic  Chest:  ctab  CVS: regular rate and rhythm  Abdominal: soft, nontender, normal bowel sounds  Extremities: no cyanosis or edema  Skin: warm and dry without rash      Labs:  BMP:   Recent Labs     11/18/21 0633 11/18/21  0848 11/19/21 0137 11/20/21 0133   *  --  134* 135*   K 6.6* 5.6 5.5* 4.3   CL 88*  --  95* 95*   CO2 22  --  23 27   BUN 60*  --  26* 15   CREATININE 6.8*  --  4.7* 3.2*   CALCIUM 9.1  --  8.4* 8.4*     CBC:   Recent Labs     11/18/21 0633 11/19/21 0137 11/20/21 0133   WBC 5.9 4.5* 4.1*   HGB 12.7 11.4* 10.5*   HCT 40.3 37.6 35.2*   MCV 93.7 94.9 97.5   * 106* 117*     LIVER PROFILE:   Recent Labs     11/18/21 0633 11/19/21 0137 11/20/21 0133   AST 23 12 12   ALT 12 8 7   BILITOT 0.4 0.4 0.4   ALKPHOS 151* 108* 109*     PT/INR:   Recent Labs     11/18/21 0633   PROTIME 13.1   INR 0.97     APTT:   Recent Labs     11/18/21 0633   APTT 27.5     BNP:  No results for input(s): BNP in the last 72 hours. Ionized Calcium:No results for input(s): IONCA in the last 72 hours. Magnesium:  Recent Labs     11/19/21 0137 11/20/21 0133   MG 2.5 2.2     Phosphorus:No results for input(s): PHOS in the last 72 hours. HgbA1C: No results for input(s): LABA1C in the last 72 hours. Hepatic:   Recent Labs     11/18/21 0633 11/19/21 0137 11/20/21 0133   ALKPHOS 151* 108* 109*   ALT 12 8 7   AST 23 12 12   PROT 7.1 5.6* 5.8*   BILITOT 0.4 0.4 0.4   LABALBU 3.9 3.4* 3.5     Lactic Acid:   Recent Labs     11/19/21  0137   LACTA 0.8     Troponin: No results for input(s): CKTOTAL, CKMB, TROPONINT in the last 72 hours. ABGs: No results for input(s): PH, PCO2, PO2, HCO3, O2SAT in the last 72 hours. CRP:  No results for input(s): CRP in the last 72 hours. Sed Rate:  No results for input(s): SEDRATE in the last 72 hours. Cultures:   No results for input(s): CULTURE in the last 72 hours. No results for input(s): BC, Cooper Gardner in the last 72 hours.   No results for input(s): CXSURG in the last 72 hours. Radiology reports as per the Radiologist  Radiology: CT Head WO Contrast    Result Date: 11/18/2021  Examination. CT HEAD WO CONTRAST 11/18/2021 6:50 AM History: Stroke symptoms. Altered mental status. Left-sided weakness. DLP: 619 mGycm. The CT scan of the head is performed without intravenous contrast enhancement. The images are acquired in axial plane and subsequent reconstruction in coronal and sagittal planes. The comparison is made with the previous study dated 10/21/2020. There is no evidence of a mass. No midline shift. There is no intracranial hemorrhage or hematoma. Moderately dilated ventricles, basal cistern and the cortical sulci are similar to the previous study representing a chronic volume loss/atrophy. The scattered areas of chronic white matter ischemia are seen in the centrum semiovale bilaterally. The gray-white matter differentiation is maintained. The images reviewed in bone window show no acute bony abnormality. The visualized paranasal sinuses and mastoid air cells are clear. No acute intracranial abnormality. The above results were immediately called to ER physician, Dr. Royce Trimble. Signed by Dr Elida Martinez    Result Date: 11/18/2021  Examination. XR CHEST PORTABLE 11/18/2021 7:15 AM History: Hypertension. Left-sided weakness. A frontal portable upright view of the chest is compared with the previous study dated 5/29/2021. There are persistent interstitial changes in the lungs bilaterally which may represent pulmonary vascular congestion or pulmonary edema. There is no pleural effusion. No pneumothorax. There is moderate fullness of the latha bilaterally which may represent pulmonary arterial hypertension. Heart size is not optimally evaluated due to portable projection. Left subclavian venous stent is seen in place. There is moderate diffuse osteopenia. Pulmonary vascular congestion/pulmonary edema.  Signed by Dr Fina Young W CONTRAST    Result Date: 11/18/2021  Examination. CTA NECK W CONTRAST 11/18/2021 6:59 AM History: Stroke symptoms. DLP: 297 mGycm. The CT angiography of the neck is performed after intravenous contrast enhancement. The images are acquired in axial plane with subsequent 2-D reconstruction in coronal and sagittal planes and 3-D maximum intensity projection reconstruction. There is no previous similar study for comparison. Atheromatous changes of the limited visualized aortic arch is noted. Mild atheromatous changes of the origin of the brachiocephalic trunk is seen. No stenosis. It divides into normal-appearing right common carotid and subclavian arteries. Normal origin of the left common carotid artery seen. A large calcific plaque is seen at the origin of the left subclavian artery with less than 50% focal stenosis at the origin. Incidentally noted is a left subclavian venous stent in place which is patent. Both common carotid arteries are patent and most of the course. There is a calcific plaque along the anterior aspect of the mid left common carotid artery without stenosis. There is a large plaque at the bifurcation of the left common carotid artery which extends posteriorly into the proximal carotid bulb with 50-69% stenosis of the carotid bulb. It extends into the proximal internal carotid artery with less than 50% stenosis of the left internal carotid artery. The remaining left internal carotid artery is normal in caliber with moderate tortuous course. There is a large plaque along the posterior right lateral aspect of the distal right common carotid artery which extends into the carotid bulb with without a significant stenosis of the bulb. Normal size moderately tortuous right internal carotid artery seen. No stenosis. There is a small calcific plaque at the origin of the right vertebral artery from the right subclavian vein. No significant stenosis. There is normal origin of the left vertebral artery. Normal vertebral arteries bilaterally is seen in the rest of the course in the neck. No focal stenosis or aneurysmal dilatation. Atheromatous changes of the common and proximal internal carotid arteries bilaterally with a moderate, 50-69% stenosis of the proximal left internal carotid artery. The remaining carotid and vertebral arteries are unremarkable as detailed above. The NASCET criteria was utilized for estimation of carotid arterial stenosis. Signed by Dr Roger Dooley    Result Date: 11/18/2021  Examination. CTA HEAD W CONTRAST 11/18/2021 6:53 AM History: Stroke symptoms. DLP: 297 mGycm. The CT angiography of the head is performed after intravenous contrast enhancement. The images are acquired in axial plane and subsequent 2-D reconstruction in coronal and sagittal planes and 3-D maximum intensity projection reconstruction. There is no previous study for comparison. The correlation is made with CT scan of the head obtained earlier today. Normal size internal carotid arteries are seen at the base of the skull, carotid canals and cavernous sinus bilaterally. There are atheromatous plaques in the internal carotid arteries bilaterally in the cavernous sinus without a significant stenosis. No aneurysmal dilatation. Normal size suprasellar internal carotid arteries bilaterally is seen subsequently dividing into normal-appearing anterior and middle cerebral arteries. There is subsequent normal insular and opercular branches. Normal size vertebral arteries enter the foramen magnum and join to make a normal-sized basilar artery which subsequently divides into normal-appearing posterior cerebral arteries bilaterally. Subsequent normal temporal and occipital branches. No areas of focal stenosis or aneurysmal dilatation. Normal CT angiography of the head. The NASCET criteria was utilized for estimation of carotid arterial stenosis.  Signed by Dr Jero Melendez   ESRD  DM2 with nephropathy on long-term insulin  HTN  Hyponatremia  Hyperkalemia  Secondary Hyperparathyroidism  Anemia CKD  Seizure disorder  Hypothyroidism  Acute hypoxemic respiratory failure  Acute pulmonary edema  And acute metabolic encephalopathy    Plan:  Discussed with patient as able, nursing  Will plan additional dialysis treatment 11/19 for hyperkalemia then TTS per routine schedule  Blood pressure trends improving, follow post HD  Monitor labs      Thank you for the consult, we appreciate the opportunity to provide care to your patients. Feel free to contact me if I can be of any further assistance.       Marychuy Carl MD  11/20/21  10:49 AM

## 2021-11-20 NOTE — PROGRESS NOTES
Physical Therapy   Name: Destin Owens  MRN:  [id number]  Date of service:  11/20/2021  Pt. not available for PT due to HD. Will f/u at a later time.   Electronically signed by Padmini Oconnor PT on 11/20/2021 at 11:30 AM

## 2021-11-21 PROBLEM — N39.0 UTI (URINARY TRACT INFECTION): Status: ACTIVE | Noted: 2021-11-21

## 2021-11-21 LAB
ANION GAP SERPL CALCULATED.3IONS-SCNC: 13 MMOL/L (ref 7–19)
BACTERIA: ABNORMAL /HPF
BILIRUBIN URINE: NEGATIVE
BLOOD, URINE: ABNORMAL
BUN BLDV-MCNC: 11 MG/DL (ref 6–20)
CALCIUM SERPL-MCNC: 8.6 MG/DL (ref 8.6–10)
CHLORIDE BLD-SCNC: 95 MMOL/L (ref 98–111)
CLARITY: ABNORMAL
CO2: 26 MMOL/L (ref 22–29)
COLOR: ABNORMAL
CREAT SERPL-MCNC: 2.8 MG/DL (ref 0.5–0.9)
EPITHELIAL CELLS, UA: ABNORMAL /HPF
GFR AFRICAN AMERICAN: 21
GFR NON-AFRICAN AMERICAN: 18
GLUCOSE BLD-MCNC: 121 MG/DL (ref 70–99)
GLUCOSE BLD-MCNC: 153 MG/DL (ref 70–99)
GLUCOSE BLD-MCNC: 157 MG/DL (ref 70–99)
GLUCOSE BLD-MCNC: 228 MG/DL (ref 70–99)
GLUCOSE BLD-MCNC: 237 MG/DL (ref 70–99)
GLUCOSE BLD-MCNC: 247 MG/DL (ref 74–109)
GLUCOSE URINE: NEGATIVE MG/DL
KETONES, URINE: ABNORMAL MG/DL
LEUKOCYTE ESTERASE, URINE: ABNORMAL
NITRITE, URINE: NEGATIVE
PERFORMED ON: ABNORMAL
PH UA: 6 (ref 5–8)
POTASSIUM REFLEX MAGNESIUM: 4.2 MMOL/L (ref 3.5–5)
PROTEIN UA: 300 MG/DL
RBC UA: ABNORMAL /HPF (ref 0–2)
SODIUM BLD-SCNC: 134 MMOL/L (ref 136–145)
SPECIFIC GRAVITY UA: 1.01 (ref 1–1.03)
UROBILINOGEN, URINE: 0.2 E.U./DL
WBC UA: ABNORMAL /HPF (ref 0–5)

## 2021-11-21 PROCEDURE — 6360000002 HC RX W HCPCS: Performed by: INTERNAL MEDICINE

## 2021-11-21 PROCEDURE — 82947 ASSAY GLUCOSE BLOOD QUANT: CPT

## 2021-11-21 PROCEDURE — 36415 COLL VENOUS BLD VENIPUNCTURE: CPT

## 2021-11-21 PROCEDURE — 2580000003 HC RX 258: Performed by: NURSE PRACTITIONER

## 2021-11-21 PROCEDURE — 6360000002 HC RX W HCPCS: Performed by: NURSE PRACTITIONER

## 2021-11-21 PROCEDURE — 6370000000 HC RX 637 (ALT 250 FOR IP): Performed by: INTERNAL MEDICINE

## 2021-11-21 PROCEDURE — 97162 PT EVAL MOD COMPLEX 30 MIN: CPT

## 2021-11-21 PROCEDURE — 97530 THERAPEUTIC ACTIVITIES: CPT

## 2021-11-21 PROCEDURE — 80048 BASIC METABOLIC PNL TOTAL CA: CPT

## 2021-11-21 PROCEDURE — 81001 URINALYSIS AUTO W/SCOPE: CPT

## 2021-11-21 PROCEDURE — 6370000000 HC RX 637 (ALT 250 FOR IP): Performed by: NURSE PRACTITIONER

## 2021-11-21 PROCEDURE — 1210000000 HC MED SURG R&B

## 2021-11-21 RX ADMIN — HYDRALAZINE HYDROCHLORIDE 75 MG: 25 TABLET, FILM COATED ORAL at 16:15

## 2021-11-21 RX ADMIN — HYDRALAZINE HYDROCHLORIDE 75 MG: 25 TABLET, FILM COATED ORAL at 00:10

## 2021-11-21 RX ADMIN — HEPARIN SODIUM 5000 UNITS: 5000 INJECTION INTRAVENOUS; SUBCUTANEOUS at 14:10

## 2021-11-21 RX ADMIN — NIFEDIPINE 60 MG: 60 TABLET, EXTENDED RELEASE ORAL at 08:21

## 2021-11-21 RX ADMIN — ISOSORBIDE MONONITRATE 120 MG: 60 TABLET, EXTENDED RELEASE ORAL at 08:21

## 2021-11-21 RX ADMIN — HYDRALAZINE HYDROCHLORIDE 75 MG: 25 TABLET, FILM COATED ORAL at 08:22

## 2021-11-21 RX ADMIN — DULOXETINE 60 MG: 60 CAPSULE, DELAYED RELEASE ORAL at 08:21

## 2021-11-21 RX ADMIN — SENNOSIDES 8.6 MG: 8.6 TABLET, FILM COATED ORAL at 08:21

## 2021-11-21 RX ADMIN — CLONIDINE HYDROCHLORIDE 0.3 MG: 0.1 TABLET ORAL at 05:47

## 2021-11-21 RX ADMIN — HYDRALAZINE HYDROCHLORIDE 10 MG: 20 INJECTION INTRAMUSCULAR; INTRAVENOUS at 02:39

## 2021-11-21 RX ADMIN — METOPROLOL SUCCINATE 100 MG: 50 TABLET, EXTENDED RELEASE ORAL at 06:35

## 2021-11-21 RX ADMIN — HEPARIN SODIUM 5000 UNITS: 5000 INJECTION INTRAVENOUS; SUBCUTANEOUS at 05:48

## 2021-11-21 RX ADMIN — SEVELAMER CARBONATE 800 MG: 800 TABLET, FILM COATED ORAL at 12:03

## 2021-11-21 RX ADMIN — CLONIDINE HYDROCHLORIDE 0.3 MG: 0.1 TABLET ORAL at 14:09

## 2021-11-21 RX ADMIN — INSULIN LISPRO 4 UNITS: 100 INJECTION, SOLUTION INTRAVENOUS; SUBCUTANEOUS at 08:29

## 2021-11-21 RX ADMIN — CEFTRIAXONE 1000 MG: 1 INJECTION, POWDER, FOR SOLUTION INTRAMUSCULAR; INTRAVENOUS at 12:03

## 2021-11-21 RX ADMIN — HYDROCHLOROTHIAZIDE 50 MG: 25 TABLET ORAL at 08:22

## 2021-11-21 RX ADMIN — INSULIN GLARGINE 10 UNITS: 100 INJECTION, SOLUTION SUBCUTANEOUS at 21:57

## 2021-11-21 RX ADMIN — SEVELAMER CARBONATE 800 MG: 800 TABLET, FILM COATED ORAL at 18:09

## 2021-11-21 RX ADMIN — CLONIDINE HYDROCHLORIDE 0.3 MG: 0.1 TABLET ORAL at 21:56

## 2021-11-21 RX ADMIN — INSULIN LISPRO 2 UNITS: 100 INJECTION, SOLUTION INTRAVENOUS; SUBCUTANEOUS at 12:05

## 2021-11-21 RX ADMIN — ATORVASTATIN CALCIUM 20 MG: 40 TABLET, FILM COATED ORAL at 08:21

## 2021-11-21 RX ADMIN — LISINOPRIL 20 MG: 10 TABLET ORAL at 21:56

## 2021-11-21 RX ADMIN — HYDRALAZINE HYDROCHLORIDE 75 MG: 25 TABLET, FILM COATED ORAL at 21:56

## 2021-11-21 RX ADMIN — SEVELAMER CARBONATE 800 MG: 800 TABLET, FILM COATED ORAL at 08:21

## 2021-11-21 RX ADMIN — HYDROCODONE BITARTRATE AND ACETAMINOPHEN 1 TABLET: 5; 325 TABLET ORAL at 21:56

## 2021-11-21 RX ADMIN — HEPARIN SODIUM 5000 UNITS: 5000 INJECTION INTRAVENOUS; SUBCUTANEOUS at 21:57

## 2021-11-21 RX ADMIN — ROPINIROLE HYDROCHLORIDE 4 MG: 4 TABLET, FILM COATED ORAL at 21:57

## 2021-11-21 RX ADMIN — LEVOTHYROXINE SODIUM 150 MCG: 75 TABLET ORAL at 05:47

## 2021-11-21 ASSESSMENT — ENCOUNTER SYMPTOMS
WHEEZING: 0
ABDOMINAL PAIN: 0
TROUBLE SWALLOWING: 0
ABDOMINAL DISTENTION: 0
BLOOD IN STOOL: 0
COUGH: 0
CONSTIPATION: 0
SORE THROAT: 0
NAUSEA: 0
VOMITING: 0
SINUS PAIN: 0
DIARRHEA: 0
SHORTNESS OF BREATH: 0

## 2021-11-21 ASSESSMENT — PAIN SCALES - GENERAL
PAINLEVEL_OUTOF10: 7
PAINLEVEL_OUTOF10: 0

## 2021-11-21 NOTE — PROGRESS NOTES
Nephrology ([facility]) Consult Note      Patient:  Trevon Pelaez  YOB: 1970  Date of Service: 11/21/2021  MRN: [id number]   Acct: [de-identified]   Primary Care Physician: ROMINA Lewis  Advance Directive: Full Code  Admit Date: 11/18/2021       Hospital Day: 3  Referring Provider: Dylon Troncoso MD    Patient independently seen and examined, Chart, Consults, Notes, Operative notes, Labs, Cardiology, and Radiology studies reviewed as available. Subjective:  Trevon Pelaez is a 45 y.o. female for whom we were consulted for evaluation and treatment of end-stage renal disease. Patient has a Tuesday Thursday Saturday fashion at the [facility] dialysis clinic. She presented in transfer from the clinic with altered mental status and hypotension. She was found to have left-sided weakness and blood pressures over systolics of 599. She was admitted to the ICU for further evaluation and treatment. On initial examination, she was not answering questions appropriately and therefore unable to fully participate in history and physical examination. History taken in chart review and discussion from Dr. Karma Cardenas of the emergency room Dr. Charlie Rod of the hospital service. Last recorded well time was 2330 on 11/17. Today, no overnight events. She has moved out of the ICU. She was in a more normal mental status today. Denies chest pain, shortness of air at rest, nausea or vomiting.             Allergies:  Penicillins, Lamisil advanced [terbinafine], Stadol [butorphanol], and Reglan [metoclopramide]    Medicines:  Current Facility-Administered Medications   Medication Dose Route Frequency Provider Last Rate Last Admin    insulin lispro (HUMALOG) injection vial 0-12 Units  0-12 Units SubCUTAneous TID WC ROMINA Ferraro   4 Units at 11/21/21 0829    insulin lispro (HUMALOG) injection vial 0-6 Units  0-6 Units SubCUTAneous Nightly ROMINA Ferraro       [facility] cefTRIAXone (ROCEPHIN) 1,000 mg in sterile water 10 mL IV syringe  1,000 mg IntraVENous Q24H ROMINA Luu        [facility] injection 4 mg  4 mg IntraVENous Q6H PRN ROMINA Barth - CNP   4 mg at 11/20/21 1150    heparin (porcine) injection 5,000 Units  5,000 Units SubCUTAneous 3 times per day Brianna Romero MD   5,000 Units at 11/21/21 0548    albuterol sulfate  (90 Base) MCG/ACT inhaler 2 puff  2 puff Inhalation Q4H PRN Brianna Romero MD        cloNIDine (CATAPRES) tablet 0.3 mg  0.3 mg Oral 3 times per day Brianna Romero MD   0.3 mg at 11/21/21 0547    DULoxetine (CYMBALTA) extended release capsule 60 mg  60 mg Oral Daily Brianna Romero MD   60 mg at 11/21/21 7739    hydrALAZINE (APRESOLINE) tablet 75 mg  75 mg Oral 3 times per day Brianna Romero MD   75 mg at 11/21/21 3318    hydroCHLOROthiazide (HYDRODIURIL) tablet 50 mg  50 mg Oral Daily Brianna Romero MD   50 mg at 11/21/21 0822    HYDROcodone-acetaminophen (NORCO) 5-325 MG per tablet 1 tablet  1 tablet Oral Q6H PRN Brianna Romero MD        insulin glargine (LANTUS) injection vial 10 Units  10 Units SubCUTAneous Nightly Brianna Romero MD   10 Units at 11/20/21 2019    hydrALAZINE (APRESOLINE) injection 10 mg  10 mg IntraVENous Q6H PRN Brianna Romero MD   10 mg at 11/21/21 0239    labetalol (NORMODYNE;TRANDATE) injection 20 mg  20 mg IntraVENous Q4H PRN Brianna Romero MD        isosorbide mononitrate (IMDUR) extended release tablet 120 mg  120 mg Oral Daily Brianna Romero MD   120 mg at 11/21/21 5403    levothyroxine (SYNTHROID) tablet 150 mcg  150 mcg Oral Daily Brianna Romero MD   150 mcg at 11/21/21 0547    metoprolol succinate (TOPROL XL) extended release tablet 100 mg  100 mg Oral QAM AC Brianna Romero MD   100 mg at 11/21/21 6418    NIFEdipine (PROCARDIA XL) extended release tablet 60 mg  60 mg Oral Daily Brianna Romero MD   60 mg at 11/21/21 0821    rOPINIRole (REQUIP) tablet 4 mg  4 mg Oral Nightly BREAST DUCT WITH LACRIMAL DUCT PROBE, PEC BLOCK performed by Isabel Gaviria MD at [address], LAPAROSCOPIC      2008    COLONOSCOPY Left 9/17/2020    Dr Kinsey Villar hepatic flexure-Severe tortuosity with severe spasming, 1 yr recall    DIALYSIS FISTULA CREATION Left 1/25/2019    REVISION LEFT UPPER EXTREMITY AV ACCESS WITH LEFT BRACHIAL ARTERY TO LEFT AXILLARY VEIN WITH  INTERPOSITIONAL ARTEGRAFT   performed by Jose De Jesus Story MD at [address]  2012    [address] Right 1/25/2019    INSERTION OF RIGHT INTERNAL JUGULAR HEMODIALYSIS CATHETER performed by Jose De Jesus Story MD at [address] [facility]  08/17/2018    1.  Moderately increased stainable iron identified by special stain in Kupffer cells and occasional hepatocytes. Negative for evidence of significant portal or lobular inflammation. Negative for evidence of significant fibrosis    ID COLONOSCOPY W/BIOPSY SINGLE/MULTIPLE N/A 10/20/2017    Dr Bernardo Larsen prep-Tubular AP (-) dysplasia x 2--3 yr recall    ID EGD TRANSORAL BIOPSY SINGLE/MULTIPLE N/A 12/27/2016    Dr Sammie Cadena EGD TRANSORAL BIOPSY SINGLE/MULTIPLE N/A 10/20/2017    Dr Ar Hoffman (-)   [address] [facility] VASCULAR SURGERY Left 2016    fistula by Dr Julisa Chou at [address]  10/02/2017    [facility]. Left upper fistulograms/venograms, balloon angioplasty cephalic vein arch [id number] conquest.    VASCULAR SURGERY  08/2018    FISTULOGRAM    VASCULAR SURGERY  10/29/2018    [facility]. Left upper fistulograms/venograms    VASCULAR SURGERY  12/19/2018    [facility]. Arch aortogram,left upper arteriograms.  VASCULAR SURGERY  03/06/2019    [facility]. Removal of tunneled dialyis catheter right internal jugular vein.  VASCULAR SURGERY  08/28/2019    [facility]. Left upper extremity fistulogram including venography to the superior vena cava. Balloon angioplasty left subclavian vein with 10mm x 40mm conquest balloon. Balloon angioplasty mid/proximal upper arm cephalic vein with 44VO x 40mm conquest balloon. Venograms after balloon angioplasty. Stent left mid/proximal upper arm cephalic vein stenosis fluency 10mm x 60mm self-expanding covered stent. Balloon     VASCULAR SURGERY  2019    cont angioplasty stent with 10mm x 40mm conquest balloon. Completion venograms left upper extremity. Family History  Family History   Problem Relation Age of Onset    Colon Cancer Mother          at 63    Colon Polyps Mother     Lung Cancer Father          at 79    Colon Polyps Father     Stomach Cancer Sister     Breast Cancer Sister 27    Depression Daughter 25        committed suicide    Liver Cancer Neg Hx     Liver Disease Neg Hx     Esophageal Cancer Neg Hx     Rectal Cancer Neg Hx        Social History  Social History     Socioeconomic History    Marital status: Single     Spouse name: Not on file    Number of children: 2    Years of education: Not on file    Highest education level: Not on file   Occupational History    Not on file   Tobacco Use    Smoking status: Current Every Day Smoker     Packs/day: 0.50     Years: 33.00     Pack years: 16.50     Types: Cigarettes    Smokeless tobacco: Never Used    Tobacco comment: NOW at 1/4 PD, started at age 24 and has averaged 2 PPD   Vaping Use    Vaping Use: Never used   Substance and Sexual Activity    Alcohol use: No    Drug use: Not Currently     Types: Marijuana Lucianne Bars)    Sexual activity: Not Currently     Partners: Male   Other Topics Concern    Not on file   Social History Narrative    Not on file     Social Determinants of Health     Financial Resource Strain:     Difficulty of Paying Living Expenses: Not on file   Food Insecurity:     Worried About Running Out of Food in the Last Year: Not on file    Kendy of Food in the Last Year: Not on file   Transportation Needs:     Lack of Transportation (Medical): Not on file    Lack of Transportation (Non-Medical):  Not on file   Physical Ill-appearing and lethargic  Chest:  ctab  CVS: regular rate and rhythm  Abdominal: soft, nontender, normal bowel sounds  Extremities: no cyanosis or edema  Skin: warm and dry without rash      Labs:  BMP:   Recent Labs     11/19/21 0137 11/20/21 0133 11/21/21  0210   * 135* 134*   K 5.5* 4.3 4.2   CL 95* 95* 95*   CO2 23 27 26   BUN 26* 15 11   CREATININE 4.7* 3.2* 2.8*   CALCIUM 8.4* 8.4* 8.6     CBC:   Recent Labs     11/19/21 0137 11/20/21 0133   WBC 4.5* 4.1*   HGB 11.4* 10.5*   HCT 37.6 35.2*   MCV 94.9 97.5   * 117*     LIVER PROFILE:   Recent Labs     11/19/21 0137 11/20/21 0133   AST 12 12   ALT 8 7   BILITOT 0.4 0.4   ALKPHOS 108* 109*     PT/INR:   No results for input(s): PROTIME, INR in the last 72 hours. APTT:   No results for input(s): APTT in the last 72 hours. BNP:  No results for input(s): BNP in the last 72 hours. Ionized Calcium:No results for input(s): IONCA in the last 72 hours. Magnesium:  Recent Labs     11/19/21 0137 11/20/21 0133   MG 2.5 2.2     Phosphorus:No results for input(s): PHOS in the last 72 hours. HgbA1C: No results for input(s): LABA1C in the last 72 hours. Hepatic:   Recent Labs     11/19/21 0137 11/20/21 0133   ALKPHOS 108* 109*   ALT 8 7   AST 12 12   PROT 5.6* 5.8*   BILITOT 0.4 0.4   LABALBU 3.4* 3.5     Lactic Acid:   Recent Labs     11/19/21 0137   LACTA 0.8     Troponin: No results for input(s): CKTOTAL, CKMB, TROPONINT in the last 72 hours. ABGs: No results for input(s): PH, PCO2, PO2, HCO3, O2SAT in the last 72 hours. CRP:  No results for input(s): CRP in the last 72 hours. Sed Rate:  No results for input(s): SEDRATE in the last 72 hours. Cultures:   No results for input(s): CULTURE in the last 72 hours. No results for input(s): BC, Geanie Ridge in the last 72 hours. No results for input(s): CXSURG in the last 72 hours.     Radiology reports as per the Radiologist  Radiology: CT Head WO Contrast    Result Date: 11/18/2021  Examination. CT HEAD WO CONTRAST 11/18/2021 6:50 AM History: Stroke symptoms. Altered mental status. Left-sided weakness. DLP: 619 mGycm. The CT scan of the head is performed without intravenous contrast enhancement. The images are acquired in axial plane and subsequent reconstruction in coronal and sagittal planes. The comparison is made with the previous study dated 10/21/2020. There is no evidence of a mass. No midline shift. There is no intracranial hemorrhage or hematoma. Moderately dilated ventricles, basal cistern and the cortical sulci are similar to the previous study representing a chronic volume loss/atrophy. The scattered areas of chronic white matter ischemia are seen in the centrum semiovale bilaterally. The gray-white matter differentiation is maintained. The images reviewed in bone window show no acute bony abnormality. The visualized paranasal sinuses and mastoid air cells are clear. No acute intracranial abnormality. The above results were immediately called to ER physician, Dr. Robert Stokes. Signed by Dr Ela Harris    Result Date: 11/18/2021  Examination. XR CHEST PORTABLE 11/18/2021 7:15 AM History: Hypertension. Left-sided weakness. A frontal portable upright view of the chest is compared with the previous study dated 5/29/2021. There are persistent interstitial changes in the lungs bilaterally which may represent pulmonary vascular congestion or pulmonary edema. There is no pleural effusion. No pneumothorax. There is moderate fullness of the latha bilaterally which may represent pulmonary arterial hypertension. Heart size is not optimally evaluated due to portable projection. Left subclavian venous stent is seen in place. There is moderate diffuse osteopenia. Pulmonary vascular congestion/pulmonary edema. Signed by Dr Marquetta Leyden Date: 11/18/2021  Examination.  CTA NECK W CONTRAST 11/18/2021 6:59 AM History: Stroke symptoms. DLP: 297 mGycm. The CT angiography of the neck is performed after intravenous contrast enhancement. The images are acquired in axial plane with subsequent 2-D reconstruction in coronal and sagittal planes and 3-D maximum intensity projection reconstruction. There is no previous similar study for comparison. Atheromatous changes of the limited visualized aortic arch is noted. Mild atheromatous changes of the origin of the brachiocephalic trunk is seen. No stenosis. It divides into normal-appearing right common carotid and subclavian arteries. Normal origin of the left common carotid artery seen. A large calcific plaque is seen at the origin of the left subclavian artery with less than 50% focal stenosis at the origin. Incidentally noted is a left subclavian venous stent in place which is patent. Both common carotid arteries are patent and most of the course. There is a calcific plaque along the anterior aspect of the mid left common carotid artery without stenosis. There is a large plaque at the bifurcation of the left common carotid artery which extends posteriorly into the proximal carotid bulb with 50-69% stenosis of the carotid bulb. It extends into the proximal internal carotid artery with less than 50% stenosis of the left internal carotid artery. The remaining left internal carotid artery is normal in caliber with moderate tortuous course. There is a large plaque along the posterior right lateral aspect of the distal right common carotid artery which extends into the carotid bulb with without a significant stenosis of the bulb. Normal size moderately tortuous right internal carotid artery seen. No stenosis. There is a small calcific plaque at the origin of the right vertebral artery from the right subclavian vein. No significant stenosis. There is normal origin of the left vertebral artery. Normal vertebral arteries bilaterally is seen in the rest of the course in the neck.  No focal stenosis or aneurysmal dilatation. Atheromatous changes of the common and proximal internal carotid arteries bilaterally with a moderate, 50-69% stenosis of the proximal left internal carotid artery. The remaining carotid and vertebral arteries are unremarkable as detailed above. The NASCET criteria was utilized for estimation of carotid arterial stenosis. Signed by Dr Caremn Talbot    Result Date: 11/18/2021  Examination. CTA HEAD W CONTRAST 11/18/2021 6:53 AM History: Stroke symptoms. DLP: 297 mGycm. The CT angiography of the head is performed after intravenous contrast enhancement. The images are acquired in axial plane and subsequent 2-D reconstruction in coronal and sagittal planes and 3-D maximum intensity projection reconstruction. There is no previous study for comparison. The correlation is made with CT scan of the head obtained earlier today. Normal size internal carotid arteries are seen at the base of the skull, carotid canals and cavernous sinus bilaterally. There are atheromatous plaques in the internal carotid arteries bilaterally in the cavernous sinus without a significant stenosis. No aneurysmal dilatation. Normal size suprasellar internal carotid arteries bilaterally is seen subsequently dividing into normal-appearing anterior and middle cerebral arteries. There is subsequent normal insular and opercular branches. Normal size vertebral arteries enter the foramen magnum and join to make a normal-sized basilar artery which subsequently divides into normal-appearing posterior cerebral arteries bilaterally. Subsequent normal temporal and occipital branches. No areas of focal stenosis or aneurysmal dilatation. Normal CT angiography of the head. The NASCET criteria was utilized for estimation of carotid arterial stenosis.  Signed by Dr Ratliff Estimable   ESRD  DM2 with nephropathy on long-term insulin  HTN  Hyponatremia  Hyperkalemia  Secondary Hyperparathyroidism  Anemia CKD  Seizure disorder  Hypothyroidism  Acute hypoxemic respiratory failure  Acute pulmonary edema  And acute metabolic encephalopathy    Plan:  Discussed with patient as able, nursing  Additional dialysis treatment 11/19 for hyperkalemia then TTS per routine schedule  Blood pressure trends were improving, adjust meds  Monitor labs      Thank you for the consult, we appreciate the opportunity to provide care to your patients. Feel free to contact me if I can be of any further assistance.       Misty Booker MD  11/21/21  11:11 AM

## 2021-11-21 NOTE — PROGRESS NOTES
Physical Therapy    Facility/Department: [facility] PARUL/VAS/MED  Initial Assessment    NAME: Micki Tobin  : 1970  MRN: [id number]    Date of Service: 2021    Discharge Recommendations:  Continue to assess pending progress, 24 hour supervision or assist, Patient would benefit from continued therapy after discharge        Assessment   Body structures, Functions, Activity limitations: Decreased functional mobility ; Decreased strength; Decreased posture; Decreased balance; Decreased sensation  Assessment: Pt. will benefit from cont. PT to decrease impairments. Pt. a fall risk and should not attempt mobility on her own at this time. Pt. needs to use RW, staff A and gait belt. Pt. needs 24 hr care upon d/c home. Not safe to d/c home alone. Pt's HD schedule may interfere with PT sessions. Treatment Diagnosis: debility  Prognosis: Good  Decision Making: Medium Complexity  PT Education: PT Role; General Safety; Goals; Functional Mobility Training  Patient Education: use of call light for staff A with mobility  REQUIRES PT FOLLOW UP: Yes  Activity Tolerance  Activity Tolerance: Other  Activity Tolerance: nausea and dizziness (RN aware)       Patient Diagnosis(es): The primary encounter diagnosis was Altered mental status, unspecified altered mental status type. Diagnoses of Hypertensive urgency, ESRD (end stage renal disease) on dialysis ([address] Hyperkalemia, Acute respiratory failure with hypoxia ([address] and Other hypervolemia were also pertinent to this visit.      has a past medical history of Arthritis, Chronic kidney disease, stage 5, kidney failure ([address] Closed fracture of right shoulder girdle, with routine healing, subsequent encounter, DM (diabetes mellitus) type II controlled with renal manifestation ([address] Gastroparesis, GERD (gastroesophageal reflux disease), Hemodialysis patient ([address] Hypertension, Hypothyroid, Nasal congestion, Neuropathy, Noncompliance with medications, Palliative care patient, and Restless leg syndrome. has a past surgical history that includes Tubal ligation; pr egd transoral biopsy single/multiple (N/A, 12/27/2016); Endometrial ablation (2012); pr colonoscopy w/biopsy single/multiple (N/A, 10/20/2017); pr egd transoral biopsy single/multiple (N/A, 10/20/2017); Dialysis fistula creation (Left, 1/25/2019); hc dialysis catheter (Right, 1/25/2019); Cholecystectomy, laparoscopic; liver biopsy (08/17/2018); vascular surgery (Left, 2016); vascular surgery (10/02/2017); vascular surgery (08/2018); vascular surgery (10/29/2018); vascular surgery (12/19/2018); vascular surgery (03/06/2019); vascular surgery (08/28/2019); vascular surgery (08/28/2019); Colonoscopy (Left, 9/17/2020); Breast surgery (Left, 2008); and Breast surgery (Right, 5/25/2021). Restrictions  Restrictions/Precautions  Restrictions/Precautions: Fall Risk  Required Braces or Orthoses?: No  Vision/Hearing  Vision: Impaired (reports diabetic retinopathy)  Hearing: Within functional limits     Subjective  General  Chart Reviewed: Yes  Patient assessed for rehabilitation services?: Yes  Response To Previous Treatment: Not applicable  Family / Caregiver Present: No  Referring Practitioner: ROMINA Summers CNP  Referral Date : 11/20/21  Diagnosis: Acute respiratory failure with hypoxia, AMS, ESRD, hyperkalemia, hypertensive urgency, hypervolemia  Follows Commands: Within Functional Limits  General Comment  Comments: RNAle PT. Subjective  Subjective: Pt. willing to work with therapy. Feels nauseated.   Pain Screening  Patient Currently in Pain: Denies  Pain Assessment  Pain Assessment: 0-10  Pain Level: 0  Vital Signs  Patient Currently in Pain: Denies  Pre Treatment Pain Screening  Intervention List: Patient able to continue with treatment    Orientation  Orientation  Overall Orientation Status: Within Functional Limits  Social/Functional History  Social/Functional History  Lives With: Alone  Type of Home: House  Home Layout: One level  Home Access: Stairs to enter without rails  Entrance Stairs - Number of Steps: a few  Home Equipment: Rolling walker  ADL Assistance: Independent  Ambulation Assistance: Independent  Transfer Assistance: Independent  Additional Comments: reports falls in past, states she has a sore spot on her bottom, HD T,Th, Sat  Cognition   Cognition  Overall Cognitive Status: WFL    Objective     Observation/Palpation  Posture: Good    AROM RLE (degrees)  RLE AROM: WFL  AROM LLE (degrees)  LLE AROM : WFL  Strength RLE  Strength RLE: WFL  Comment: 4-/5 grossly  Strength LLE  Strength LLE: WFL  Comment: 4-/5 grossly     Sensation  Overall Sensation Status: Impaired (neuropathy B feet)  Bed mobility  Supine to Sit: Stand by assistance  Sit to Supine: Unable to assess  Transfers  Sit to Stand: Contact guard assistance  Stand to sit: Contact guard assistance  Ambulation  Ambulation?: Yes  Ambulation 1  Surface: level tile  Device: Rolling Walker  Assistance: Minimal assistance  Gait Deviations: Slow Soraya; Decreased step length; Decreased step height  Distance: 20'  Comments: paused several times with amb. due to feeling dizzy, then felt nauseated after amb. Balance  Posture: Good  Sitting - Static: Good; +  Sitting - Dynamic: Good; -  Standing - Static: Fair; +  Standing - Dynamic: Poor; +        Plan   Plan  Times per week: 3-7  Times per day: Daily  Plan weeks: 2  Current Treatment Recommendations: Strengthening, Balance Training, Functional Mobility Training, Transfer Training, Endurance Training, Gait Training, Safety Education & Training, Positioning, Equipment Evaluation, Education, & procurement, Patient/Caregiver Education & Training  Plan Comment: cont. PT per POC.   Safety Devices  Type of devices: Gait belt, Patient at risk for falls, Nurse notified, Call light within reach, Left in chair, Chair alarm in place (pt left seated on waffle cushion, RN notified pt feeling light headed and nauseated)    G-Code       OutComes Score                                                  AM-PAC Score             Goals  Short term goals  Time Frame for Short term goals: 2 wks  Short term goal 1: supine to sit indep  Short term goal 2: sit to stand indep  Short term goal 3: amb. 200' indep with RW  Patient Goals   Patient goals : go home       Therapy Time   Individual Concurrent Group Co-treatment   Time In           Time Out           Minutes                   Marceil Landau, PT    Electronically signed by Marceil Landau, PT on 11/21/2021 at 2:58 PM

## 2021-11-21 NOTE — PROGRESS NOTES
leg swelling. Gastrointestinal: Negative for abdominal distention, abdominal pain, blood in stool, constipation, diarrhea, nausea and vomiting. Endocrine: Negative for cold intolerance and heat intolerance. Genitourinary: Negative for difficulty urinating, flank pain, frequency and urgency. Musculoskeletal: Negative for arthralgias and myalgias. Neurological: Positive for weakness. Negative for dizziness, syncope, light-headedness and numbness. Reports generalized weakness    Hematological: Does not bruise/bleed easily. Psychiatric/Behavioral: Negative for agitation, confusion and dysphoric mood. Objective:   VITALS:  BP (!) 171/73   Pulse 61   Temp 97.9 °F (36.6 °C) (Temporal)   Resp 16   Ht 5' 6\" (1.676 m)   Wt 147 lb 7 oz (66.9 kg)   SpO2 96%   BMI 23.80 kg/m²   24HR INTAKE/OUTPUT:      Intake/Output Summary (Last 24 hours) at 11/21/2021 1202  Last data filed at 11/21/2021 1119  Gross per 24 hour   Intake 570 ml   Output    Net 570 ml       Physical Exam  Constitutional:       General: She is not in acute distress. Appearance: Normal appearance. She is ill-appearing (Chronically). She is not toxic-appearing or diaphoretic. HENT:      Head: Normocephalic and atraumatic. Right Ear: External ear normal.      Left Ear: External ear normal.      Nose: Nose normal. No congestion or rhinorrhea. Mouth/Throat:      Mouth: Mucous membranes are moist.      Pharynx: Oropharynx is clear. Eyes:      General: No scleral icterus. Extraocular Movements: Extraocular movements intact. Conjunctiva/sclera: Conjunctivae normal.   Cardiovascular:      Rate and Rhythm: Normal rate and regular rhythm. Pulses: Normal pulses. Heart sounds: Murmur heard. No friction rub. No gallop. Pulmonary:      Effort: Pulmonary effort is normal. No respiratory distress. Breath sounds: Normal breath sounds. No wheezing, rhonchi or rales.    Abdominal:      General: Abdomen is flat. Bowel sounds are normal. There is no distension. Palpations: Abdomen is soft. Tenderness: There is no abdominal tenderness. Musculoskeletal:         General: No swelling. Normal range of motion. Cervical back: Normal range of motion and neck supple. Right lower leg: No edema. Left lower leg: No edema. Skin:     General: Skin is warm and dry. Coloration: Skin is pale. Skin is not jaundiced. Findings: No erythema, lesion or rash. Comments: Appears mildly jaundiced   Neurological:      General: No focal deficit present. Mental Status: She is alert and oriented to person, place, and time. Mental status is at baseline. Cranial Nerves: No cranial nerve deficit. Sensory: No sensory deficit. Motor: Weakness present. Comments: No weakness noted on exam   Psychiatric:         Mood and Affect: Mood normal.         Behavior: Behavior normal.         Thought Content:  Thought content normal.         Judgment: Judgment normal.            Medications:      dextrose        insulin lispro  0-12 Units SubCUTAneous TID WC    insulin lispro  0-6 Units SubCUTAneous Nightly    cefTRIAXone (ROCEPHIN) IV  1,000 mg IntraVENous Q24H    heparin (porcine)  5,000 Units SubCUTAneous 3 times per day    cloNIDine  0.3 mg Oral 3 times per day    DULoxetine  60 mg Oral Daily    hydrALAZINE  75 mg Oral 3 times per day    hydroCHLOROthiazide  50 mg Oral Daily    insulin glargine  10 Units SubCUTAneous Nightly    isosorbide mononitrate  120 mg Oral Daily    levothyroxine  150 mcg Oral Daily    metoprolol succinate  100 mg Oral QAM AC    NIFEdipine  60 mg Oral Daily    rOPINIRole  4 mg Oral Nightly    senna  1 tablet Oral Daily    sevelamer  800 mg Oral TID WC    atorvastatin  20 mg Oral Daily    lisinopril  20 mg Oral BID    insulin regular  10 Units IntraVENous Once     ondansetron, albuterol sulfate HFA, HYDROcodone-acetaminophen, hydrALAZINE, labetalol, acetaminophen **OR** acetaminophen, glucose, dextrose, glucagon (rDNA), dextrose  ADULT DIET; Dysphagia - Soft and Bite Sized; 3 carb choices (45 gm/meal); Low Potassium (Less than 3000 mg/day)     Lab and other Data:     Recent Labs     11/19/21 0137 11/20/21 0133   WBC 4.5* 4.1*   HGB 11.4* 10.5*   * 117*     Recent Labs     11/19/21 0137 11/20/21 0133 11/21/21  0210   * 135* 134*   K 5.5* 4.3 4.2   CL 95* 95* 95*   CO2 23 27 26   BUN 26* 15 11   CREATININE 4.7* 3.2* 2.8*   GLUCOSE 139* 201* 247*     Recent Labs     11/19/21 0137 11/20/21 0133   AST 12 12   ALT 8 7   BILITOT 0.4 0.4   ALKPHOS 108* 109*     Troponin T:   No results for input(s): TROPONINI in the last 72 hours. INR:   No results for input(s): INR in the last 72 hours. ABG:  No results for input(s): PHART, DMM0DVW, PO2ART, IMM2ACN, BEART, HGBAE, O2JXBWAJ, CARBOXHGBART in the last 72 hours. RAD:     CT Head WO Contrast  Result Date: 11/18/2021    No acute intracranial abnormality. The above results were immediately called to ER physician, Dr. Walton [facility]. Signed by Dr Adria Lopez  Result Date: 11/18/2021    Pulmonary vascular congestion/pulmonary edema. Signed by Dr Blanka Romeo  Result Date: 11/18/2021    Atheromatous changes of the common and proximal internal carotid arteries bilaterally with a moderate, 50-69% stenosis of the proximal left internal carotid artery. The remaining carotid and vertebral arteries are unremarkable as detailed above. The NASCET criteria was utilized for estimation of carotid arterial stenosis. Signed by Dr Blanka Romeo  Result Date: 11/18/2021    Normal CT angiography of the head. The NASCET criteria was utilized for estimation of carotid arterial stenosis.  Signed by Dr Kvng Coffman:    Laurie Sequin- negative       Assessment/Plan   Principal Problem:    Hypertensive emergency  Active Problems:    Type 2 diabetes mellitus with chronic kidney disease on chronic dialysis, with long-term current use of insulin (HCC)    Acute encephalopathy    ESRD (end stage renal disease) on dialysis [facility])    Palliative care patient    Generalized weakness    UTI (urinary tract infection)  Resolved Problems:    * No resolved hospital problems. *      Principal Problem:    Hypertensive emergency-    - Cardene gtt weaned off on 11/19/2021   - Blood pressure mildly elevated on current medications-nephrology adjusting   - As needed hydralazine, Labetalol    - Continue home medications   - Continue to ensure adequate cerebral perfusion pressure        Active Problems:    Type 2 diabetes mellitus with chronic kidney disease on chronic dialysis, with long-term current use of insulin ([facility] [address]-    - Lantus, SSI, Accu-Chek, hypoglycemia treatment protocol in place   -Prandial coverage increased to medium coverage protocol      Acute encephalopathy possibly secondary to hypertensive emergency   - improving   -CT imaging negative for acute process   -Motor function remains intact   - Neurochecks    ESRD (end stage renal disease) on dialysis [facility])- nephrology managing HD      Palliative care patient- noted       Generalized weakness- noted, PT/OT,   Tobacco dependence- counseled          DVT Prophylaxis: Heparin    Discharge planning: case management consultation for SNF placement        Further Orders per Clinical course/attending. Electronically signed by ROMINA Turk on 11/21/2021 at 12:02 PM       Patient was examined independently. Chart reviewed and events noted. No new complaints other than stated above. Vitals noted. Chest CTAB. S1 S2 RRR. GI soft/NT/ND/ Bs+  Neuro no new deficits. Labs noted. Agree with above assessment and plan. Will continue to follow.     Dedra Keys MD  Hospitalist service  11/21/2021   1:53 PM      EMR Dragon/Transcription disclaimer:   Much of this encounter note is an electronic transcription/translation of spoken language to printed text.  The electronic translation of spoken language may permit erroneous, or at times, nonsensical words or phrases to be inadvertently transcribed; although attempts have made to review the note for such errors, some may still exist.

## 2021-11-22 LAB
ANION GAP SERPL CALCULATED.3IONS-SCNC: 16 MMOL/L (ref 7–19)
BASOPHILS ABSOLUTE: 0.1 K/UL (ref 0–0.2)
BASOPHILS RELATIVE PERCENT: 2.4 % (ref 0–1)
BUN BLDV-MCNC: 25 MG/DL (ref 6–20)
CALCIUM SERPL-MCNC: 8.9 MG/DL (ref 8.6–10)
CHLORIDE BLD-SCNC: 92 MMOL/L (ref 98–111)
CO2: 25 MMOL/L (ref 22–29)
CREAT SERPL-MCNC: 4.4 MG/DL (ref 0.5–0.9)
EOSINOPHILS ABSOLUTE: 0.2 K/UL (ref 0–0.6)
EOSINOPHILS RELATIVE PERCENT: 4.8 % (ref 0–5)
GFR AFRICAN AMERICAN: 13
GFR NON-AFRICAN AMERICAN: 10
GLUCOSE BLD-MCNC: 102 MG/DL (ref 70–99)
GLUCOSE BLD-MCNC: 171 MG/DL (ref 74–109)
GLUCOSE BLD-MCNC: 182 MG/DL (ref 70–99)
GLUCOSE BLD-MCNC: 196 MG/DL (ref 70–99)
GLUCOSE BLD-MCNC: 229 MG/DL (ref 70–99)
HCT VFR BLD CALC: 35.5 % (ref 37–47)
HEMOGLOBIN: 10.8 G/DL (ref 12–16)
IMMATURE GRANULOCYTES #: 0 K/UL
LYMPHOCYTES ABSOLUTE: 1.2 K/UL (ref 1.1–4.5)
LYMPHOCYTES RELATIVE PERCENT: 29 % (ref 20–40)
MCH RBC QN AUTO: 29.4 PG (ref 27–31)
MCHC RBC AUTO-ENTMCNC: 30.4 G/DL (ref 33–37)
MCV RBC AUTO: 96.7 FL (ref 81–99)
MONOCYTES ABSOLUTE: 0.5 K/UL (ref 0–0.9)
MONOCYTES RELATIVE PERCENT: 10.9 % (ref 0–10)
NEUTROPHILS ABSOLUTE: 2.2 K/UL (ref 1.5–7.5)
NEUTROPHILS RELATIVE PERCENT: 52.9 % (ref 50–65)
PDW BLD-RTO: 15.4 % (ref 11.5–14.5)
PERFORMED ON: ABNORMAL
PLATELET # BLD: 131 K/UL (ref 130–400)
PMV BLD AUTO: 10.1 FL (ref 9.4–12.3)
POTASSIUM REFLEX MAGNESIUM: 4.7 MMOL/L (ref 3.5–5)
RBC # BLD: 3.67 M/UL (ref 4.2–5.4)
SODIUM BLD-SCNC: 133 MMOL/L (ref 136–145)
WBC # BLD: 4.1 K/UL (ref 4.8–10.8)

## 2021-11-22 PROCEDURE — 6370000000 HC RX 637 (ALT 250 FOR IP): Performed by: HOSPITALIST

## 2021-11-22 PROCEDURE — 6370000000 HC RX 637 (ALT 250 FOR IP): Performed by: INTERNAL MEDICINE

## 2021-11-22 PROCEDURE — 6360000002 HC RX W HCPCS: Performed by: NURSE PRACTITIONER

## 2021-11-22 PROCEDURE — 1210000000 HC MED SURG R&B

## 2021-11-22 PROCEDURE — 6370000000 HC RX 637 (ALT 250 FOR IP): Performed by: NURSE PRACTITIONER

## 2021-11-22 PROCEDURE — 36415 COLL VENOUS BLD VENIPUNCTURE: CPT

## 2021-11-22 PROCEDURE — 85025 COMPLETE CBC W/AUTO DIFF WBC: CPT

## 2021-11-22 PROCEDURE — 2500000003 HC RX 250 WO HCPCS: Performed by: INTERNAL MEDICINE

## 2021-11-22 PROCEDURE — 80048 BASIC METABOLIC PNL TOTAL CA: CPT

## 2021-11-22 PROCEDURE — 6360000002 HC RX W HCPCS: Performed by: INTERNAL MEDICINE

## 2021-11-22 PROCEDURE — 2580000003 HC RX 258: Performed by: NURSE PRACTITIONER

## 2021-11-22 PROCEDURE — 82947 ASSAY GLUCOSE BLOOD QUANT: CPT

## 2021-11-22 PROCEDURE — 8010000000 HC HEMODIALYSIS ACUTE INPT

## 2021-11-22 RX ORDER — NIFEDIPINE 30 MG/1
90 TABLET, EXTENDED RELEASE ORAL DAILY
Status: DISCONTINUED | OUTPATIENT
Start: 2021-11-23 | End: 2021-11-25 | Stop reason: HOSPADM

## 2021-11-22 RX ORDER — NIFEDIPINE 30 MG/1
30 TABLET, EXTENDED RELEASE ORAL NIGHTLY
Status: DISCONTINUED | OUTPATIENT
Start: 2021-11-22 | End: 2021-11-22

## 2021-11-22 RX ORDER — NIFEDIPINE 30 MG/1
30 TABLET, EXTENDED RELEASE ORAL ONCE
Status: COMPLETED | OUTPATIENT
Start: 2021-11-22 | End: 2021-11-22

## 2021-11-22 RX ORDER — CLONIDINE 0.3 MG/24H
1 PATCH, EXTENDED RELEASE TRANSDERMAL WEEKLY
Status: DISCONTINUED | OUTPATIENT
Start: 2021-11-22 | End: 2021-11-25 | Stop reason: HOSPADM

## 2021-11-22 RX ORDER — HYDRALAZINE HYDROCHLORIDE 50 MG/1
100 TABLET, FILM COATED ORAL EVERY 8 HOURS SCHEDULED
Status: DISCONTINUED | OUTPATIENT
Start: 2021-11-22 | End: 2021-11-25 | Stop reason: HOSPADM

## 2021-11-22 RX ADMIN — NIFEDIPINE 60 MG: 60 TABLET, EXTENDED RELEASE ORAL at 12:08

## 2021-11-22 RX ADMIN — LISINOPRIL 20 MG: 10 TABLET ORAL at 05:05

## 2021-11-22 RX ADMIN — NIFEDIPINE 30 MG: 30 TABLET, EXTENDED RELEASE ORAL at 17:55

## 2021-11-22 RX ADMIN — Medication 20 MG: at 03:51

## 2021-11-22 RX ADMIN — HYDRALAZINE HYDROCHLORIDE 10 MG: 20 INJECTION INTRAMUSCULAR; INTRAVENOUS at 01:00

## 2021-11-22 RX ADMIN — SENNOSIDES 8.6 MG: 8.6 TABLET, FILM COATED ORAL at 12:08

## 2021-11-22 RX ADMIN — HYDROCHLOROTHIAZIDE 50 MG: 25 TABLET ORAL at 12:08

## 2021-11-22 RX ADMIN — HYDROCODONE BITARTRATE AND ACETAMINOPHEN 1 TABLET: 5; 325 TABLET ORAL at 21:48

## 2021-11-22 RX ADMIN — HYDRALAZINE HYDROCHLORIDE 75 MG: 25 TABLET, FILM COATED ORAL at 05:05

## 2021-11-22 RX ADMIN — HEPARIN SODIUM 5000 UNITS: 5000 INJECTION INTRAVENOUS; SUBCUTANEOUS at 14:21

## 2021-11-22 RX ADMIN — SEVELAMER CARBONATE 800 MG: 800 TABLET, FILM COATED ORAL at 12:09

## 2021-11-22 RX ADMIN — INSULIN LISPRO 2 UNITS: 100 INJECTION, SOLUTION INTRAVENOUS; SUBCUTANEOUS at 16:55

## 2021-11-22 RX ADMIN — Medication 20 MG: at 16:30

## 2021-11-22 RX ADMIN — ATORVASTATIN CALCIUM 20 MG: 40 TABLET, FILM COATED ORAL at 12:08

## 2021-11-22 RX ADMIN — LEVOTHYROXINE SODIUM 150 MCG: 75 TABLET ORAL at 05:05

## 2021-11-22 RX ADMIN — HYDRALAZINE HYDROCHLORIDE 10 MG: 20 INJECTION INTRAMUSCULAR; INTRAVENOUS at 13:26

## 2021-11-22 RX ADMIN — LISINOPRIL 20 MG: 10 TABLET ORAL at 21:48

## 2021-11-22 RX ADMIN — ROPINIROLE HYDROCHLORIDE 4 MG: 4 TABLET, FILM COATED ORAL at 21:48

## 2021-11-22 RX ADMIN — METOPROLOL SUCCINATE 100 MG: 50 TABLET, EXTENDED RELEASE ORAL at 05:03

## 2021-11-22 RX ADMIN — SEVELAMER CARBONATE 800 MG: 800 TABLET, FILM COATED ORAL at 16:55

## 2021-11-22 RX ADMIN — HYDROCODONE BITARTRATE AND ACETAMINOPHEN 1 TABLET: 5; 325 TABLET ORAL at 05:04

## 2021-11-22 RX ADMIN — HYDRALAZINE HYDROCHLORIDE 100 MG: 50 TABLET, FILM COATED ORAL at 23:24

## 2021-11-22 RX ADMIN — HEPARIN SODIUM 5000 UNITS: 5000 INJECTION INTRAVENOUS; SUBCUTANEOUS at 21:48

## 2021-11-22 RX ADMIN — CEFTRIAXONE 1000 MG: 1 INJECTION, POWDER, FOR SOLUTION INTRAMUSCULAR; INTRAVENOUS at 12:09

## 2021-11-22 RX ADMIN — INSULIN LISPRO 4 UNITS: 100 INJECTION, SOLUTION INTRAVENOUS; SUBCUTANEOUS at 21:47

## 2021-11-22 RX ADMIN — ISOSORBIDE MONONITRATE 120 MG: 60 TABLET, EXTENDED RELEASE ORAL at 12:08

## 2021-11-22 RX ADMIN — CLONIDINE HYDROCHLORIDE 0.3 MG: 0.1 TABLET ORAL at 05:04

## 2021-11-22 RX ADMIN — INSULIN GLARGINE 10 UNITS: 100 INJECTION, SOLUTION SUBCUTANEOUS at 21:47

## 2021-11-22 RX ADMIN — DULOXETINE 60 MG: 60 CAPSULE, DELAYED RELEASE ORAL at 12:08

## 2021-11-22 RX ADMIN — HYDRALAZINE HYDROCHLORIDE 10 MG: 20 INJECTION INTRAMUSCULAR; INTRAVENOUS at 06:31

## 2021-11-22 RX ADMIN — HEPARIN SODIUM 5000 UNITS: 5000 INJECTION INTRAVENOUS; SUBCUTANEOUS at 05:06

## 2021-11-22 RX ADMIN — HYDRALAZINE HYDROCHLORIDE 100 MG: 50 TABLET, FILM COATED ORAL at 14:20

## 2021-11-22 RX ADMIN — CLONIDINE HYDROCHLORIDE 0.3 MG: 0.1 TABLET ORAL at 14:20

## 2021-11-22 ASSESSMENT — ENCOUNTER SYMPTOMS
SINUS PAIN: 0
ABDOMINAL DISTENTION: 0
WHEEZING: 0
NAUSEA: 0
SHORTNESS OF BREATH: 0
BLOOD IN STOOL: 0
CONSTIPATION: 0
SINUS PRESSURE: 1
TROUBLE SWALLOWING: 0
VOMITING: 0
DIARRHEA: 0
COUGH: 0
ABDOMINAL PAIN: 0
SORE THROAT: 1

## 2021-11-22 ASSESSMENT — PAIN SCALES - GENERAL
PAINLEVEL_OUTOF10: 7
PAINLEVEL_OUTOF10: 3
PAINLEVEL_OUTOF10: 8

## 2021-11-22 ASSESSMENT — PAIN DESCRIPTION - LOCATION: LOCATION: HEAD

## 2021-11-22 ASSESSMENT — PAIN DESCRIPTION - PAIN TYPE: TYPE: ACUTE PAIN

## 2021-11-22 NOTE — PLAN OF CARE
Problem: Skin Integrity:  Goal: Will show no infection signs and symptoms  Description: Will show no infection signs and symptoms  Outcome: Ongoing  Goal: Absence of new skin breakdown  Description: Absence of new skin breakdown  Outcome: Ongoing  Goal: Status of oral mucous membranes will improve  Description: Status of oral mucous membranes will improve  Outcome: Ongoing  Goal: Skin integrity will be maintained  Description: Skin integrity will be maintained  Outcome: Ongoing     Problem: Falls - Risk of:  Goal: Will remain free from falls  Description: Will remain free from falls  Outcome: Ongoing  Goal: Absence of physical injury  Description: Absence of physical injury  Outcome: Ongoing     Problem:  Activity:  Goal: Fatigue will decrease  Description: Fatigue will decrease  Outcome: Ongoing  Goal: Risk for activity intolerance will decrease  Description: Risk for activity intolerance will decrease  Outcome: Ongoing

## 2021-11-22 NOTE — PROGRESS NOTES
Nephrology ([address] [facility]) Progress Note    Patient:  Damaso Oro  YOB: 1970  Date of Service: 11/22/2021  MRN: [id number]   Acct: [de-identified]   Primary Care Physician: ROMINA Dickinson  Advance Directive: Full Code  Admit Date: 11/18/2021       Hospital Day: 4  Referring Provider: Donna Kingston MD    Patient Seen, Chart, Consults, Notes, Labs, Radiology studies reviewed. Subjective:  Patient is a 13-year-old woman with a past medical history of end-stage renal disease, type 2 diabetes, hypertension and neuropathy. She is currently admitted with altered mental status and left-sided weakness. Since admission, patient has been more awake and is now moving all extremities. She remained confused at times. She required an ICU stay for hypertensive emergency and received Cardene drip. Renal service is managing her end-stage renal disease. On November 19, the patient was transferred to the medical floor. Her blood pressure continued to be little elevated. She was diagnosed with UTI and was placed on Rocephin. Today, patient was seen and examined on dialysis. She offers no new complaints.         Dialysis   Pt was seen on RRT  Modality: Hemodialysis  Access: Arterial Venous Fistula  Location: left upper  QB: 600  QD: 800  UF: 4 liters    Allergies:  Penicillins, Lamisil advanced [terbinafine], Stadol [butorphanol], and Reglan [metoclopramide]    Medicines:  Current Facility-Administered Medications   Medication Dose Route Frequency Provider Last Rate Last Admin    insulin lispro (HUMALOG) injection vial 0-12 Units  0-12 Units SubCUTAneous TID WC ROMINA Courtney   2 Units at 11/21/21 1205    insulin lispro (HUMALOG) injection vial 0-6 Units  0-6 Units SubCUTAneous Nightly ROMINA Courtney        cefTRIAXone (ROCEPHIN) 1,000 mg in sterile water 10 mL IV syringe  1,000 mg IntraVENous Q24H ROMINA Courtney   1,000 mg at 11/21/21 1203    ondansetron (ZOFRAN) injection 4 mg  4 mg IntraVENous Q6H PRN StephROMINA Zuleta - CNP   4 mg at 11/20/21 1150    heparin (porcine) injection 5,000 Units  5,000 Units SubCUTAneous 3 times per day Vernida Osler, MD   5,000 Units at 11/22/21 0506    albuterol sulfate  (90 Base) MCG/ACT inhaler 2 puff  2 puff Inhalation Q4H PRN Vernida Osler, MD        cloNIDine (CATAPRES) tablet 0.3 mg  0.3 mg Oral 3 times per day Vernida Osler, MD   0.3 mg at 11/22/21 0504    DULoxetine (CYMBALTA) extended release capsule 60 mg  60 mg Oral Daily Vernida Osler, MD   60 mg at 11/21/21 3720    hydrALAZINE (APRESOLINE) tablet 75 mg  75 mg Oral 3 times per day Vernida Osler, MD   75 mg at 11/22/21 0505    hydroCHLOROthiazide (HYDRODIURIL) tablet 50 mg  50 mg Oral Daily Vernida Osler, MD   50 mg at 11/21/21 0822    HYDROcodone-acetaminophen ([facility]) 5-325 MG per tablet 1 tablet  1 tablet Oral Q6H PRN Vernida Osler, MD   1 tablet at 11/22/21 0504    insulin glargine (LANTUS) injection vial 10 Units  10 Units SubCUTAneous Nightly Vernida Osler, MD   10 Units at 11/21/21 2157    hydrALAZINE (APRESOLINE) injection 10 mg  10 mg IntraVENous Q6H PRN Vernida Osler, MD   10 mg at 11/22/21 0631    labetalol (NORMODYNE;TRANDATE) injection 20 mg  20 mg IntraVENous Q4H PRN Vernida Osler, MD   20 mg at 11/22/21 0351    isosorbide mononitrate (IMDUR) extended release tablet 120 mg  120 mg Oral Daily Vernida Osler, MD   120 mg at 11/21/21 0604    levothyroxine (SYNTHROID) tablet 150 mcg  150 mcg Oral Daily Vernida Osler, MD   150 mcg at 11/22/21 0505    metoprolol succinate (TOPROL XL) extended release tablet 100 mg  100 mg Oral QAM AC Vernida Osler, MD   100 mg at 11/22/21 0503    NIFEdipine (PROCARDIA XL) extended release tablet 60 mg  60 mg Oral Daily Vernida Osler, MD   60 mg at 11/21/21 0821    rOPINIRole (REQUIP) tablet 4 mg  4 mg Oral Nightly Vernida Osler, MD   4 mg at 11/21/21 4074    senna (SENOKOT) tablet 8.6 mg  1 tablet Oral Daily Haritha Ramos MD   8.6 mg at 11/21/21 3997    sevelamer (RENVELA) tablet 800 mg  800 mg Oral TID WC Haritha Ramos MD   800 mg at 11/21/21 1809    atorvastatin (LIPITOR) tablet 20 mg  20 mg Oral Daily Haritha Ramos MD   20 mg at 11/21/21 5553    acetaminophen (TYLENOL) tablet 650 mg  650 mg Oral Q6H PRN Haritha Ramos MD   650 mg at 11/20/21 2115    Or    acetaminophen (TYLENOL) suppository 650 mg  650 mg Rectal Q6H PRN Haritha Ramos MD        lisinopril (PRINIVIL;ZESTRIL) tablet 20 mg  20 mg Oral BID Haritha Ramos MD   20 mg at 11/22/21 0505    insulin regular (HUMULIN R;NOVOLIN R) injection 10 Units  10 Units IntraVENous Once Haritha Ramos MD        glucose (GLUTOSE) 40 % oral gel 15 g  15 g Oral PRN Haritha Ramos MD        dextrose 50 % IV solution  12.5 g IntraVENous PRN Haritha Ramos MD   12.5 g at 11/18/21 2354    glucagon (rDNA) injection 1 mg  1 mg IntraMUSCular PRN Haritha Ramos MD        dextrose 5 % solution  100 mL/hr IntraVENous PRN Haritha Ramos MD           Past Medical History:  Past Medical History:   Diagnosis Date    Arthritis     Chronic kidney disease, stage 5, kidney failure [facility] [address]     Closed fracture of right shoulder girdle, with routine healing, subsequent encounter     DM (diabetes mellitus) type II controlled with renal manifestation ([facility] [address] 06/1993    Gastroparesis     GERD (gastroesophageal reflux disease)     Hemodialysis patient ([facility] [address]     dialysis on tues, thur, and sat [facility]    Hypertension     Hypothyroid     Nasal congestion     recent    Neuropathy     Noncompliance with medications     Palliative care patient 10/08/2019    Restless leg syndrome        Past Surgical History:  Past Surgical History:   Procedure Laterality Date    BREAST SURGERY Left 2008    infected milk gland removed    BREAST SURGERY Right 5/25/2021    WEDGE EXCISION RIGHT BREAST DUCT WITH LACRIMAL DUCT PROBE, PEC BLOCK performed by Micky Valencia MD at [address] [place] [address]  CHOLECYSTECTOMY, LAPAROSCOPIC      2008    COLONOSCOPY Left 9/17/2020    Dr Mary Ellen Leblanc hepatic flexure-Severe tortuosity with severe spasming, 1 yr recall    DIALYSIS FISTULA CREATION Left 1/25/2019    REVISION LEFT UPPER EXTREMITY AV ACCESS WITH LEFT BRACHIAL ARTERY TO LEFT AXILLARY VEIN WITH  INTERPOSITIONAL ARTEGRAFT   performed by Rick Israel MD at [address]  2012    [address] Right 1/25/2019    INSERTION OF RIGHT INTERNAL JUGULAR HEMODIALYSIS CATHETER performed by Rick Israel MD at [address] [facility]  08/17/2018    1.  Moderately increased stainable iron identified by special stain in Kupffer cells and occasional hepatocytes. Negative for evidence of significant portal or lobular inflammation. Negative for evidence of significant fibrosis    KS COLONOSCOPY W/BIOPSY SINGLE/MULTIPLE N/A 10/20/2017    Dr Dia Costa prep-Tubular AP (-) dysplasia x 2--3 yr recall    KS EGD TRANSORAL BIOPSY SINGLE/MULTIPLE N/A 12/27/2016    Dr Adryan Lock EGD TRANSORAL BIOPSY SINGLE/MULTIPLE N/A 10/20/2017    Dr Rakesh Ramos (-)   [facility] VASCULAR SURGERY Left 2016    fistula by Dr Ritchie Norman at [address]  10/02/2017    [facility]. Left upper fistulograms/venograms, balloon angioplasty cephalic vein arch [id number] conquest.    VASCULAR SURGERY  08/2018    FISTULOGRAM    VASCULAR SURGERY  10/29/2018    [facility]. Left upper fistulograms/venograms    VASCULAR SURGERY  12/19/2018    [facility]. Arch aortogram,left upper arteriograms.  VASCULAR SURGERY  03/06/2019    [facility]. Removal of tunneled dialyis catheter right internal jugular vein.  VASCULAR SURGERY  08/28/2019    [facility]. Left upper extremity fistulogram including venography to the superior vena cava. Balloon angioplasty left subclavian vein with 10mm x 40mm conquest balloon. Balloon angioplasty mid/proximal upper arm cephalic vein with 44IQ x 40mm conquest balloon. Venograms after balloon angioplasty. Stent left mid/proximal upper arm cephalic vein stenosis fluency 10mm x 60mm self-expanding covered stent. Balloon     VASCULAR SURGERY  2019    cont angioplasty stent with 10mm x 40mm conquest balloon. Completion venograms left upper extremity. Family History  Family History   Problem Relation Age of Onset    Colon Cancer Mother          at 63    Colon Polyps Mother     Lung Cancer Father          at 79    Colon Polyps Father     Stomach Cancer Sister     Breast Cancer Sister 27    Depression Daughter 25        committed suicide    Liver Cancer Neg Hx     Liver Disease Neg Hx     Esophageal Cancer Neg Hx     Rectal Cancer Neg Hx        Social History  Social History     Socioeconomic History    Marital status: Single     Spouse name: Not on file    Number of children: 2    Years of education: Not on file    Highest education level: Not on file   Occupational History    Not on file   Tobacco Use    Smoking status: Current Every Day Smoker     Packs/day: 0.50     Years: 33.00     Pack years: 16.50     Types: Cigarettes    Smokeless tobacco: Never Used    Tobacco comment: NOW at 1/4 PD, started at age 24 and has averaged 2 PPD   Vaping Use    Vaping Use: Never used   Substance and Sexual Activity    Alcohol use: No    Drug use: Not Currently     Types: Marijuana Valeria Lety)    Sexual activity: Not Currently     Partners: Male   Other Topics Concern    Not on file   Social History Narrative    Not on file     Social Determinants of Health     Financial Resource Strain:     Difficulty of Paying Living Expenses: Not on file   Food Insecurity:     Worried About Running Out of Food in the Last Year: Not on file    Kendy of Food in the Last Year: Not on file   Transportation Needs:     Lack of Transportation (Medical): Not on file    Lack of Transportation (Non-Medical):  Not on file   Physical Activity:     Days of Exercise per Week: Not on file    Minutes of Exercise per Session: Not on file   Stress:     Feeling of Stress : Not on file   Social Connections:     Frequency of Communication with Friends and Family: Not on file    Frequency of Social Gatherings with Friends and Family: Not on file    Attends Temple Services: Not on file    Active Member of Clubs or Organizations: Not on file    Attends Club or Organization Meetings: Not on file    Marital Status: Not on file   Intimate Partner Violence:     Fear of Current or Ex-Partner: Not on file    Emotionally Abused: Not on file    Physically Abused: Not on file    Sexually Abused: Not on file   Housing Stability:     Unable to Pay for Housing in the Last Year: Not on file    Number of [place] in the Last Year: Not on file    Unstable Housing in the Last Year: Not on file         Review of Systems:  Reviewed with the patient at the bedside, 6 points reviewed and negative except as noted above. Objective:  BP: 118/68   HR: 90  General: awake/alert   Chest:  bilateral air entry, decreased breath sounds at the bases  CVS: regular rate and rhythm  Abdominal: soft, nontender, normal bowel sounds  Extremities: no cyanosis or edema  Skin: warm and dry without rash    Labs:  BMP:   Recent Labs     11/20/21 0133 11/21/21  0210 11/22/21  0258   * 134* 133*   K 4.3 4.2 4.7   CL 95* 95* 92*   CO2 27 26 25   BUN 15 11 25*   CREATININE 3.2* 2.8* 4.4*   CALCIUM 8.4* 8.6 8.9     CBC:   Recent Labs     11/20/21 0133 11/22/21 0258   WBC 4.1* 4.1*   HGB 10.5* 10.8*   HCT 35.2* 35.5*   MCV 97.5 96.7   * 131     LIVER PROFILE:   Recent Labs     11/20/21 0133   AST 12   ALT 7   BILITOT 0.4   ALKPHOS 109*     PT/INR: No results for input(s): PROTIME, INR in the last 72 hours. APTT: No results for input(s): APTT in the last 72 hours. BNP:  No results for input(s): BNP in the last 72 hours. Ionized Calcium:No results for input(s): IONCA in the last 72 hours.   Magnesium:  Recent Labs     11/20/21 0133   MG 2.2 Phosphorus:No results for input(s): PHOS in the last 72 hours. HgbA1C: No results for input(s): LABA1C in the last 72 hours. Hepatic:   Recent Labs     11/20/21  0133   ALKPHOS 109*   ALT 7   AST 12   PROT 5.8*   BILITOT 0.4   LABALBU 3.5     Lactic Acid: No results for input(s): LACTA in the last 72 hours. Troponin: No results for input(s): CKTOTAL, CKMB, TROPONINT in the last 72 hours. ABGs: No results for input(s): PH, PCO2, PO2, HCO3, O2SAT in the last 72 hours. CRP:  No results for input(s): CRP in the last 72 hours. Sed Rate:  No results for input(s): SEDRATE in the last 72 hours. Cultures:   No results for input(s): CULTURE in the last 72 hours. No results for input(s): BC, Erby Slater in the last 72 hours. No results for input(s): CXSURG in the last 72 hours. Radiology reports as per the Radiologist  Radiology: CT Head WO Contrast    Result Date: 11/18/2021  Examination. CT HEAD WO CONTRAST 11/18/2021 6:50 AM History: Stroke symptoms. Altered mental status. Left-sided weakness. DLP: 619 mGycm. The CT scan of the head is performed without intravenous contrast enhancement. The images are acquired in axial plane and subsequent reconstruction in coronal and sagittal planes. The comparison is made with the previous study dated 10/21/2020. There is no evidence of a mass. No midline shift. There is no intracranial hemorrhage or hematoma. Moderately dilated ventricles, basal cistern and the cortical sulci are similar to the previous study representing a chronic volume loss/atrophy. The scattered areas of chronic white matter ischemia are seen in the centrum semiovale bilaterally. The gray-white matter differentiation is maintained. The images reviewed in bone window show no acute bony abnormality. The visualized paranasal sinuses and mastoid air cells are clear. No acute intracranial abnormality. The above results were immediately called to ER physician, Dr. Tato Carmona.  Signed by Dr Richard Mercado Anwer    XR CHEST PORTABLE    Result Date: 11/18/2021  Examination. XR CHEST PORTABLE 11/18/2021 7:15 AM History: Hypertension. Left-sided weakness. A frontal portable upright view of the chest is compared with the previous study dated 5/29/2021. There are persistent interstitial changes in the lungs bilaterally which may represent pulmonary vascular congestion or pulmonary edema. There is no pleural effusion. No pneumothorax. There is moderate fullness of the latha bilaterally which may represent pulmonary arterial hypertension. Heart size is not optimally evaluated due to portable projection. Left subclavian venous stent is seen in place. There is moderate diffuse osteopenia. Pulmonary vascular congestion/pulmonary edema. Signed by Dr Carlene Mcintyre Date: 11/18/2021  Examination. CTA NECK W CONTRAST 11/18/2021 6:59 AM History: Stroke symptoms. DLP: 297 mGycm. The CT angiography of the neck is performed after intravenous contrast enhancement. The images are acquired in axial plane with subsequent 2-D reconstruction in coronal and sagittal planes and 3-D maximum intensity projection reconstruction. There is no previous similar study for comparison. Atheromatous changes of the limited visualized aortic arch is noted. Mild atheromatous changes of the origin of the brachiocephalic trunk is seen. No stenosis. It divides into normal-appearing right common carotid and subclavian arteries. Normal origin of the left common carotid artery seen. A large calcific plaque is seen at the origin of the left subclavian artery with less than 50% focal stenosis at the origin. Incidentally noted is a left subclavian venous stent in place which is patent. Both common carotid arteries are patent and most of the course. There is a calcific plaque along the anterior aspect of the mid left common carotid artery without stenosis.  There is a large plaque at the bifurcation of the left common carotid artery which extends posteriorly into the proximal carotid bulb with 50-69% stenosis of the carotid bulb. It extends into the proximal internal carotid artery with less than 50% stenosis of the left internal carotid artery. The remaining left internal carotid artery is normal in caliber with moderate tortuous course. There is a large plaque along the posterior right lateral aspect of the distal right common carotid artery which extends into the carotid bulb with without a significant stenosis of the bulb. Normal size moderately tortuous right internal carotid artery seen. No stenosis. There is a small calcific plaque at the origin of the right vertebral artery from the right subclavian vein. No significant stenosis. There is normal origin of the left vertebral artery. Normal vertebral arteries bilaterally is seen in the rest of the course in the neck. No focal stenosis or aneurysmal dilatation. Atheromatous changes of the common and proximal internal carotid arteries bilaterally with a moderate, 50-69% stenosis of the proximal left internal carotid artery. The remaining carotid and vertebral arteries are unremarkable as detailed above. The NASCET criteria was utilized for estimation of carotid arterial stenosis. Signed by Dr John Steven    Result Date: 11/18/2021  Examination. CTA HEAD W CONTRAST 11/18/2021 6:53 AM History: Stroke symptoms. DLP: 297 mGycm. The CT angiography of the head is performed after intravenous contrast enhancement. The images are acquired in axial plane and subsequent 2-D reconstruction in coronal and sagittal planes and 3-D maximum intensity projection reconstruction. There is no previous study for comparison. The correlation is made with CT scan of the head obtained earlier today. Normal size internal carotid arteries are seen at the base of the skull, carotid canals and cavernous sinus bilaterally.  There are atheromatous plaques in the internal carotid arteries bilaterally in the cavernous sinus without a significant stenosis. No aneurysmal dilatation. Normal size suprasellar internal carotid arteries bilaterally is seen subsequently dividing into normal-appearing anterior and middle cerebral arteries. There is subsequent normal insular and opercular branches. Normal size vertebral arteries enter the foramen magnum and join to make a normal-sized basilar artery which subsequently divides into normal-appearing posterior cerebral arteries bilaterally. Subsequent normal temporal and occipital branches. No areas of focal stenosis or aneurysmal dilatation. Normal CT angiography of the head. The NASCET criteria was utilized for estimation of carotid arterial stenosis. Signed by Dr Chava Pedro     -ESRD  -DM2 with nephropathy on long-term insulin  -HTN  -Hyponatremia  -Hyperkalemia  -Secondary Hyperparathyroidism  -Anemia CKD  -Seizure disorder  -Hypothyroidism  -Acute hypoxemic respiratory failure  -Acute pulmonary edema  -Metabolic encephalopathy  -Urine tract infection    Plan:  Dialysis as above, low K diet, follow up labs. Continue antibiotics. Continue current BP meds.     Evette Bryan MD, MD  11/22/21  10:14 AM

## 2021-11-22 NOTE — PROGRESS NOTES
Occupational Therapy    Nsg. Requested pt. Withhold from therapy this PM as her BP has been elevated all day. Will check back in the morning.   Electronically signed by Rahat Azul OT on 11/22/2021 at 2:24 PM

## 2021-11-22 NOTE — PROGRESS NOTES
[facility]      Patient:  Wade Olson  YOB: 1970  Date of Service: 11/22/2021  MRN: [id number]   Acct: [de-identified]   Primary Care Physician: ROMINA Wheeler  Advance Directive: Full Code  Admit Date: 11/18/2021       Hospital Day: 4  Portions of this note have been copied forward, however, changed to reflect the most current clinical status of this patient. CHIEF COMPLAINT altered mental status    SUBJECTIVE: Patient states she is feeling much better today. She states that she feels she needs to be stronger before returning home. CUMULATIVE HOSPITAL COURSE:  The patient is a 61year old female with past medical history of ESRD, DM, hypertension and neuropathy, who presented to [address] [place] [address] ED from dialysis center with complaints of altered mental status and left-sided weakness. Last well-known night before admission. Patient was unable to provide HPI information on arrival.  HPI obtained from review of chart. Dialysis staff reported patient having altered mental status. Patient was moving all four extremities on admission but remained confused. Patient was admitted to ICU for hypertensive emergency, started on Cardene drip. Nephrology was consulted to manage ESRD. Patient was weaned off Cardene drip and transferred to floor on 11/19/2021. Blood pressure remained mildly elevated, nephrology adjusted medications. UA taken on 11/21/21 +leukocytes 1+bacteria Rocephin started, did not meet criteria for reflex for culture. Is alert and oriented at this time. She states she feels weak and fatigued. Procardia and Apresoline adjusted. PT OT evaluations in process. Management to assist in discharge planning. Patient noted small nodule to left forearm. Denies pain to the area. Review of Systems:   Review of Systems   Constitutional: Positive for fatigue. Negative for chills, diaphoresis and fever. HENT: Positive for ear pain (Left), postnasal drip, sinus pressure and sore throat. Negative for congestion, sinus pain and trouble swallowing. Eyes: Negative for visual disturbance. Respiratory: Negative for cough, shortness of breath and wheezing. Cardiovascular: Negative for chest pain, palpitations and leg swelling. Gastrointestinal: Negative for abdominal distention, abdominal pain, blood in stool, constipation, diarrhea, nausea and vomiting. Endocrine: Negative for cold intolerance and heat intolerance. Genitourinary: Negative for difficulty urinating, flank pain, frequency and urgency. Musculoskeletal: Negative for arthralgias and myalgias. Neurological: Positive for weakness. Negative for dizziness, syncope, light-headedness and numbness. Reports generalized weakness    Hematological: Does not bruise/bleed easily. Psychiatric/Behavioral: Negative for agitation, confusion and dysphoric mood. 14 point review of systems is negative except as specifically addressed above. Objective:   VITALS:  BP (!) 186/84   Pulse 63   Temp 98.6 °F (37 °C) (Temporal)   Resp 16   Ht 5' 6\" (1.676 m)   Wt 147 lb 7 oz (66.9 kg)   SpO2 98%   BMI 23.80 kg/m²   24HR INTAKE/OUTPUT:    Intake/Output Summary (Last 24 hours) at 11/22/2021 1656  Last data filed at 11/22/2021 1653  Gross per 24 hour   Intake 830 ml   Output 3500 ml   Net -2670 ml       Physical Exam  Constitutional:       General: She is not in acute distress. Appearance: Normal appearance. She is ill-appearing (Chronically). She is not toxic-appearing or diaphoretic. HENT:      Head: Normocephalic and atraumatic. Right Ear: External ear normal.      Left Ear: External ear normal.      Nose: Nose normal. No congestion or rhinorrhea. Mouth/Throat:      Mouth: Mucous membranes are moist.      Comments: Poor dentition. Posterior pharynx without exudate. Eyes:      General: No scleral icterus. Extraocular Movements: Extraocular movements intact.       Conjunctiva/sclera: Conjunctivae normal. Pupils: Pupils are equal, round, and reactive to light. Cardiovascular:      Rate and Rhythm: Normal rate and regular rhythm. Pulses: Normal pulses. Heart sounds: Murmur heard. No friction rub. No gallop. Pulmonary:      Effort: Pulmonary effort is normal. No respiratory distress. Breath sounds: Normal breath sounds. No wheezing, rhonchi or rales. Abdominal:      General: Abdomen is flat. Bowel sounds are normal. There is no distension. Palpations: Abdomen is soft. Tenderness: There is no abdominal tenderness. Musculoskeletal:         General: No swelling. Normal range of motion. Cervical back: Normal range of motion and neck supple. Right lower leg: No edema. Left lower leg: No edema. Comments: Nickel sized palpable nodule noted to left forearm appears to have some bruising. Skin:     General: Skin is warm and dry. Coloration: Skin is pale. Skin is not jaundiced. Findings: No erythema, lesion or rash. Neurological:      General: No focal deficit present. Mental Status: She is alert and oriented to person, place, and time. Mental status is at baseline. Cranial Nerves: No cranial nerve deficit. Sensory: No sensory deficit. Motor: No weakness. Psychiatric:         Mood and Affect: Mood normal.         Behavior: Behavior normal.         Thought Content:  Thought content normal.         Judgment: Judgment normal.             Medications:      dextrose        hydrALAZINE  100 mg Oral 3 times per day    NIFEdipine  90 mg Oral Daily    cloNIDine  1 patch TransDERmal Weekly    insulin lispro  0-12 Units SubCUTAneous TID WC    insulin lispro  0-6 Units SubCUTAneous Nightly    cefTRIAXone (ROCEPHIN) IV  1,000 mg IntraVENous Q24H    heparin (porcine)  5,000 Units SubCUTAneous 3 times per day    DULoxetine  60 mg Oral Daily    hydroCHLOROthiazide  50 mg Oral Daily    insulin glargine  10 Units SubCUTAneous Nightly    isosorbide mononitrate  120 mg Oral Daily    levothyroxine  150 mcg Oral Daily    metoprolol succinate  100 mg Oral QAM AC    rOPINIRole  4 mg Oral Nightly    senna  1 tablet Oral Daily    sevelamer  800 mg Oral TID WC    atorvastatin  20 mg Oral Daily    lisinopril  20 mg Oral BID    insulin regular  10 Units IntraVENous Once     ondansetron, albuterol sulfate HFA, HYDROcodone-acetaminophen, hydrALAZINE, labetalol, acetaminophen **OR** acetaminophen, glucose, dextrose, glucagon (rDNA), dextrose  ADULT DIET; Dysphagia - Soft and Bite Sized; 3 carb choices (45 gm/meal); Low Potassium (Less than 3000 mg/day)     Lab and other Data:     Recent Labs     11/20/21 0133 11/22/21  0258   WBC 4.1* 4.1*   HGB 10.5* 10.8*   * 131     Recent Labs     11/20/21 0133 11/21/21  0210 11/22/21  0258   * 134* 133*   K 4.3 4.2 4.7   CL 95* 95* 92*   CO2 27 26 25   BUN 15 11 25*   CREATININE 3.2* 2.8* 4.4*   GLUCOSE 201* 247* 171*     Recent Labs     11/20/21 0133   AST 12   ALT 7   BILITOT 0.4   ALKPHOS 109*     Troponin T: No results for input(s): TROPONINI in the last 72 hours. Pro-BNP: No results for input(s): BNP in the last 72 hours. INR: No results for input(s): INR in the last 72 hours. UA:  Recent Labs     11/21/21  0920   COLORU DARK YELLOW*   PHUR 6.0   WBCUA TNTC*   RBCUA 0-1   BACTERIA 1+*   CLARITYU TURBID*   SPECGRAV 1.014   LEUKOCYTESUR LARGE*   UROBILINOGEN 0.2   BILIRUBINUR Negative   BLOODU MODERATE*   GLUCOSEU Negative     A1C: No results for input(s): LABA1C in the last 72 hours. ABG:No results for input(s): PHART, ENA7EIO, PO2ART, APP1MBQ, BEART, HGBAE, O5AOBITD, CARBOXHGBART in the last 72 hours. RAD:   CT Head WO Contrast    Result Date: 11/18/2021  No acute intracranial abnormality. The above results were immediately called to ER physician, Dr. [facility]. Signed by Dr Geoff Sahu    Result Date: 11/18/2021  Pulmonary vascular congestion/pulmonary edema. Signed by Dr Bea Leigh    Result Date: 11/18/2021  Atheromatous changes of the common and proximal internal carotid arteries bilaterally with a moderate, 50-69% stenosis of the proximal left internal carotid artery. The remaining carotid and vertebral arteries are unremarkable as detailed above. The NASCET criteria was utilized for estimation of carotid arterial stenosis. Signed by Dr Edgar Pat    Result Date: 11/18/2021  Normal CT angiography of the head. The NASCET criteria was utilized for estimation of carotid arterial stenosis. Signed by Dr Dot Leyden: Urine did not meet criteria for culture    Assessment/Plan   Principal Problem:    Hypertensive emergency   -Cardene drip   -Increase Procardia   -Increase hydralazine   -Any other regimen   -Monitor closely   -As needed medications with parameters    Active Problems:    Type 2 diabetes mellitus with chronic kidney disease on chronic dialysis, with long-term current use of insulin ([facility])   -Continue current insulin regimen    -Glycemia protocol in place   -To new [facility]      Acute encephalopathy   -Mental status improving   -PT, OT, SLP      ESRD (end stage renal disease) on dialysis [facility])   -Nephrology on board for management of dialysis      Palliative care patient   -Noted      Generalized weakness   -PT, OT   -Fall precautions      UTI (urinary tract infection)   -Urine did not meet parameters for culture   -Continue Rocephin at this time    Resolved Problems:    * No resolved hospital problems.  *      Antibiotic: Rocephin    DVT Prophylaxis: Heparin subcu        ROMINA Kilgore - CNP, 11/22/2021 4:56 PM

## 2021-11-22 NOTE — PROGRESS NOTES
11/22/21 1353   General Comment   Comments No TX per Za Luther RN.    Patient's BP consistently has been high post dialysis   Physical Therapy    Electronically signed by Eduarda Pleitez PTA on 11/22/2021 at 1:54 PM

## 2021-11-23 ENCOUNTER — APPOINTMENT (OUTPATIENT)
Dept: ULTRASOUND IMAGING | Age: 51
DRG: 304 | End: 2021-11-23
Payer: MEDICARE

## 2021-11-23 LAB
ANION GAP SERPL CALCULATED.3IONS-SCNC: 14 MMOL/L (ref 7–19)
BASOPHILS ABSOLUTE: 0.1 K/UL (ref 0–0.2)
BASOPHILS RELATIVE PERCENT: 2.2 % (ref 0–1)
BUN BLDV-MCNC: 18 MG/DL (ref 6–20)
CALCIUM SERPL-MCNC: 8.7 MG/DL (ref 8.6–10)
CHLORIDE BLD-SCNC: 94 MMOL/L (ref 98–111)
CO2: 26 MMOL/L (ref 22–29)
CREAT SERPL-MCNC: 3.6 MG/DL (ref 0.5–0.9)
EOSINOPHILS ABSOLUTE: 0.2 K/UL (ref 0–0.6)
EOSINOPHILS RELATIVE PERCENT: 5.2 % (ref 0–5)
GFR AFRICAN AMERICAN: 16
GFR NON-AFRICAN AMERICAN: 13
GLUCOSE BLD-MCNC: 172 MG/DL (ref 70–99)
GLUCOSE BLD-MCNC: 195 MG/DL (ref 74–109)
GLUCOSE BLD-MCNC: 200 MG/DL (ref 70–99)
GLUCOSE BLD-MCNC: 227 MG/DL (ref 70–99)
HCT VFR BLD CALC: 37.5 % (ref 37–47)
HEMOGLOBIN: 11.4 G/DL (ref 12–16)
IMMATURE GRANULOCYTES #: 0 K/UL
LYMPHOCYTES ABSOLUTE: 1.2 K/UL (ref 1.1–4.5)
LYMPHOCYTES RELATIVE PERCENT: 28.6 % (ref 20–40)
MCH RBC QN AUTO: 29.2 PG (ref 27–31)
MCHC RBC AUTO-ENTMCNC: 30.4 G/DL (ref 33–37)
MCV RBC AUTO: 96.2 FL (ref 81–99)
MONOCYTES ABSOLUTE: 0.5 K/UL (ref 0–0.9)
MONOCYTES RELATIVE PERCENT: 11.6 % (ref 0–10)
NEUTROPHILS ABSOLUTE: 2.1 K/UL (ref 1.5–7.5)
NEUTROPHILS RELATIVE PERCENT: 52.4 % (ref 50–65)
PDW BLD-RTO: 15.5 % (ref 11.5–14.5)
PERFORMED ON: ABNORMAL
PLATELET # BLD: 132 K/UL (ref 130–400)
PMV BLD AUTO: 10 FL (ref 9.4–12.3)
POTASSIUM REFLEX MAGNESIUM: 4.3 MMOL/L (ref 3.5–5)
RBC # BLD: 3.9 M/UL (ref 4.2–5.4)
SODIUM BLD-SCNC: 134 MMOL/L (ref 136–145)
WBC # BLD: 4.1 K/UL (ref 4.8–10.8)

## 2021-11-23 PROCEDURE — 76882 US LMTD JT/FCL EVL NVASC XTR: CPT

## 2021-11-23 PROCEDURE — 85025 COMPLETE CBC W/AUTO DIFF WBC: CPT

## 2021-11-23 PROCEDURE — 6360000002 HC RX W HCPCS: Performed by: NURSE PRACTITIONER

## 2021-11-23 PROCEDURE — 97530 THERAPEUTIC ACTIVITIES: CPT

## 2021-11-23 PROCEDURE — 2500000003 HC RX 250 WO HCPCS: Performed by: NURSE PRACTITIONER

## 2021-11-23 PROCEDURE — 82947 ASSAY GLUCOSE BLOOD QUANT: CPT

## 2021-11-23 PROCEDURE — 36415 COLL VENOUS BLD VENIPUNCTURE: CPT

## 2021-11-23 PROCEDURE — 6360000002 HC RX W HCPCS: Performed by: INTERNAL MEDICINE

## 2021-11-23 PROCEDURE — 97165 OT EVAL LOW COMPLEX 30 MIN: CPT

## 2021-11-23 PROCEDURE — 6370000000 HC RX 637 (ALT 250 FOR IP): Performed by: HOSPITALIST

## 2021-11-23 PROCEDURE — 1210000000 HC MED SURG R&B

## 2021-11-23 PROCEDURE — 80048 BASIC METABOLIC PNL TOTAL CA: CPT

## 2021-11-23 PROCEDURE — 6370000000 HC RX 637 (ALT 250 FOR IP): Performed by: NURSE PRACTITIONER

## 2021-11-23 PROCEDURE — 2580000003 HC RX 258: Performed by: NURSE PRACTITIONER

## 2021-11-23 PROCEDURE — 6370000000 HC RX 637 (ALT 250 FOR IP): Performed by: INTERNAL MEDICINE

## 2021-11-23 RX ORDER — CLINDAMYCIN PHOSPHATE 300 MG/50ML
300 INJECTION INTRAVENOUS EVERY 8 HOURS
Status: DISCONTINUED | OUTPATIENT
Start: 2021-11-23 | End: 2021-11-25 | Stop reason: HOSPADM

## 2021-11-23 RX ORDER — HYDROCODONE BITARTRATE AND ACETAMINOPHEN 5; 325 MG/1; MG/1
1 TABLET ORAL EVERY 4 HOURS PRN
Status: DISCONTINUED | OUTPATIENT
Start: 2021-11-23 | End: 2021-11-25 | Stop reason: HOSPADM

## 2021-11-23 RX ADMIN — INSULIN GLARGINE 10 UNITS: 100 INJECTION, SOLUTION SUBCUTANEOUS at 21:33

## 2021-11-23 RX ADMIN — LISINOPRIL 20 MG: 10 TABLET ORAL at 21:32

## 2021-11-23 RX ADMIN — HEPARIN SODIUM 5000 UNITS: 5000 INJECTION INTRAVENOUS; SUBCUTANEOUS at 14:40

## 2021-11-23 RX ADMIN — SEVELAMER CARBONATE 800 MG: 800 TABLET, FILM COATED ORAL at 17:56

## 2021-11-23 RX ADMIN — HYDRALAZINE HYDROCHLORIDE 100 MG: 50 TABLET, FILM COATED ORAL at 14:41

## 2021-11-23 RX ADMIN — ISOSORBIDE MONONITRATE 120 MG: 60 TABLET, EXTENDED RELEASE ORAL at 08:00

## 2021-11-23 RX ADMIN — SENNOSIDES 8.6 MG: 8.6 TABLET, FILM COATED ORAL at 08:00

## 2021-11-23 RX ADMIN — LISINOPRIL 20 MG: 10 TABLET ORAL at 08:00

## 2021-11-23 RX ADMIN — NIFEDIPINE 90 MG: 30 TABLET, EXTENDED RELEASE ORAL at 08:00

## 2021-11-23 RX ADMIN — CEFTRIAXONE 1000 MG: 1 INJECTION, POWDER, FOR SOLUTION INTRAMUSCULAR; INTRAVENOUS at 10:20

## 2021-11-23 RX ADMIN — LEVOTHYROXINE SODIUM 150 MCG: 75 TABLET ORAL at 06:56

## 2021-11-23 RX ADMIN — METOPROLOL SUCCINATE 100 MG: 50 TABLET, EXTENDED RELEASE ORAL at 06:56

## 2021-11-23 RX ADMIN — ATORVASTATIN CALCIUM 20 MG: 40 TABLET, FILM COATED ORAL at 08:00

## 2021-11-23 RX ADMIN — HEPARIN SODIUM 5000 UNITS: 5000 INJECTION INTRAVENOUS; SUBCUTANEOUS at 21:33

## 2021-11-23 RX ADMIN — ONDANSETRON 4 MG: 2 INJECTION INTRAMUSCULAR; INTRAVENOUS at 14:47

## 2021-11-23 RX ADMIN — SEVELAMER CARBONATE 800 MG: 800 TABLET, FILM COATED ORAL at 08:00

## 2021-11-23 RX ADMIN — INSULIN LISPRO 2 UNITS: 100 INJECTION, SOLUTION INTRAVENOUS; SUBCUTANEOUS at 21:32

## 2021-11-23 RX ADMIN — HYDRALAZINE HYDROCHLORIDE 100 MG: 50 TABLET, FILM COATED ORAL at 06:56

## 2021-11-23 RX ADMIN — HEPARIN SODIUM 5000 UNITS: 5000 INJECTION INTRAVENOUS; SUBCUTANEOUS at 06:56

## 2021-11-23 RX ADMIN — HYDROCHLOROTHIAZIDE 50 MG: 25 TABLET ORAL at 08:00

## 2021-11-23 RX ADMIN — HYDRALAZINE HYDROCHLORIDE 100 MG: 50 TABLET, FILM COATED ORAL at 23:59

## 2021-11-23 RX ADMIN — ROPINIROLE HYDROCHLORIDE 4 MG: 4 TABLET, FILM COATED ORAL at 21:32

## 2021-11-23 RX ADMIN — INSULIN LISPRO 4 UNITS: 100 INJECTION, SOLUTION INTRAVENOUS; SUBCUTANEOUS at 12:26

## 2021-11-23 RX ADMIN — HYDROCODONE BITARTRATE AND ACETAMINOPHEN 1 TABLET: 5; 325 TABLET ORAL at 06:55

## 2021-11-23 RX ADMIN — DULOXETINE 60 MG: 60 CAPSULE, DELAYED RELEASE ORAL at 08:00

## 2021-11-23 RX ADMIN — INSULIN LISPRO 2 UNITS: 100 INJECTION, SOLUTION INTRAVENOUS; SUBCUTANEOUS at 17:55

## 2021-11-23 RX ADMIN — CLINDAMYCIN IN 5 PERCENT DEXTROSE 300 MG: 6 INJECTION, SOLUTION INTRAVENOUS at 17:56

## 2021-11-23 ASSESSMENT — ENCOUNTER SYMPTOMS
ABDOMINAL PAIN: 0
ABDOMINAL DISTENTION: 0
COUGH: 0
BLOOD IN STOOL: 0
WHEEZING: 0
SHORTNESS OF BREATH: 0
SORE THROAT: 1
SINUS PRESSURE: 1
SINUS PAIN: 0
TROUBLE SWALLOWING: 0
DIARRHEA: 0
CONSTIPATION: 0
VOMITING: 0
NAUSEA: 0

## 2021-11-23 ASSESSMENT — PAIN DESCRIPTION - ORIENTATION: ORIENTATION: LOWER

## 2021-11-23 ASSESSMENT — PAIN DESCRIPTION - DESCRIPTORS: DESCRIPTORS: ACHING;DISCOMFORT

## 2021-11-23 ASSESSMENT — PAIN DESCRIPTION - PAIN TYPE: TYPE: ACUTE PAIN

## 2021-11-23 ASSESSMENT — PAIN SCALES - GENERAL
PAINLEVEL_OUTOF10: 8
PAINLEVEL_OUTOF10: 8
PAINLEVEL_OUTOF10: 0

## 2021-11-23 ASSESSMENT — PAIN DESCRIPTION - LOCATION: LOCATION: BACK

## 2021-11-23 NOTE — PROGRESS NOTES
11/23/21 1631   Restrictions/Precautions   Restrictions/Precautions Fall Risk   Required Braces or Orthoses? No   General   Chart Reviewed Yes   Subjective   Subjective I don't know if I can do anything. They washed my ear out and I am really dizzy. I can sit EOB and see what I can do   Pain Screening   Patient Currently in Pain Yes   Intervention List Patient able to continue with treatment   Pain Assessment   Pain Assessment 0-10   Pain Level 8   Patient's Stated Pain Goal No pain   Pain Type Acute pain   Pain Location Back   Pain Orientation Lower   Pain Descriptors Aching; Discomfort   Non-Pharmaceutical Pain Intervention(s) Rest   Vital Signs   Level of Consciousness Alert (0)   Bed Mobility   Supine to Sit Stand by assistance   Sit to Supine Stand by assistance   Comment Patient sat EOB SBA/CGA  for EX and diziness to subside   Transfers   Sit to Stand Contact guard assistance   Stand to sit Contact guard assistance   Bed to Chair Contact guard assistance   Comment Patient stood [facility] assist to chair   Ambulation   Ambulation?  No   Exercises   Heelslides 15   Hip Flexion 10  (Stopped due to LBP)   Hip Abduction 5  (Stapped due to LBP)   Knee Long Arc Quad 15   Activity Tolerance   Activity Tolerance Patient limited by pain; Treatment limited secondary to medical complications (free text)  (Dizziness)   Safety Devices   Type of devices Left in chair; Call light within reach  (Searched could not locate personal alarm.  )   Physical Therapy    Electronically signed by Elo Canchola PTA on 11/23/2021 at 4:41 PM

## 2021-11-23 NOTE — CARE COORDINATION
Date / Time of Evaluation:   11/23/2021    3:07 PM  Assessment Completed by:   Gerson Berman RN      Patient Name:   Deonna Jiménez  MRN:   [id number]  [place]:   1970    Patient Admission Status:   Inpatient [101]    Patient Contact Information:    [address] [place] [address] [address]  [phone] (home)   Telephone Information:   Mobile [phone]     Above information verified? [x]   Yes  []   No    (Best Practice:   Have patient/caregiver verify above address and phone number by stating out loud their current address and reachable phone number. Initial Assessment Completed at bedside with:      [x]   Patient  []   Family/Caregiver/Guardian   []   Other:      Current PCP:    ROMINA Lara    PCP verified? [x]   Yes  []   No    Emergency Contacts:    Extended Emergency Contact Information  Primary Emergency Contact: Yobani Leiva   [address] Phone: [phone]  Mobile Phone: [phone]  Relation: Other  Secondary Emergency Contact: Bhumika Ortez  Address: [address]. [address] Phone: [phone]  Mobile Phone: [phone]  Relation: Brother/Sister    Advance Directives:    Does Ms. Zeina Pak have an advance directive in her electronic medical record? []   Yes  [x]   No    Code Status:   Full Code      Have you been vaccinated for COVID-19 (SARS-CoV-2)? []   Yes  [x]   No                   If so, when?     Which :         []   Pf[employer]  []   Moderna  []   [employer]  []   Other:       Do you have any of the following unmet social needs that would keep you from returning home safely:    []   Yes  [x]   No                    Unmet Social Needs:           []   Living Situation/Housing  []   Food  []   Stroke Education   []   Utilities  []   Personal Safety  []   Financial Strain  []   Employment  []   Mental Health  []   Substance Abuse  []   Transportation Barriers    Additional Unmet Social Needs

## 2021-11-23 NOTE — PROGRESS NOTES
[facility]      Patient:  Toñito Mitchell  YOB: 1970  Date of Service: 11/23/2021  MRN: [id number]   Acct: [de-identified]   Primary Care Physician: ROMINA Gastelum  Advance Directive: Full Code  Admit Date: 11/18/2021       Hospital Day: 5  Portions of this note have been copied forward, however, changed to reflect the most current clinical status of this patient. CHIEF COMPLAINT altered mental status    SUBJECTIVE: She is complaining about a toothache. And an earache today. CUMULATIVE HOSPITAL COURSE:  The patient is a 61year old female with past medical history of ESRD, DM, hypertension and neuropathy, who presented to [facility] ED from dialysis center with complaints of altered mental status and left-sided weakness. Last well-known night before admission. Patient was unable to provide HPI information on arrival.  HPI obtained from review of chart. Dialysis staff reported patient having altered mental status. Patient was moving all four extremities on admission but remained confused. Patient was admitted to ICU for hypertensive emergency, started on Cardene drip. Nephrology was consulted to manage ESRD. Patient was weaned off Cardene drip and transferred to floor on 11/19/2021. Blood pressure remained mildly elevated, nephrology adjusted medications. UA taken on 11/21/21 +leukocytes 1+bacteria Rocephin started, did not meet criteria for reflex for culture. Is alert and oriented at this time. She states she feels weak and fatigued. Procardia and Apresoline adjusted. PT OT evaluations in process. Case management to assist in discharge planning, patient inquiring about possible skilled rehab. Patient noted small nodule to left forearm, soft tissue ultrasound of the left upper extremity pending. Denies pain to the area. Left ear canal appears to be impacted, Debrox drops ordered.   Patient has multiple missing and broken teeth, however there is no redness or swelling noted in the gum.  Blood pressure slightly improved overnight. Review of Systems:   Review of Systems   Constitutional: Positive for fatigue. Negative for chills, diaphoresis and fever. HENT: Positive for ear pain (Left), postnasal drip, sinus pressure and sore throat. Negative for congestion, sinus pain and trouble swallowing. Eyes: Negative for visual disturbance. Respiratory: Negative for cough, shortness of breath and wheezing. Cardiovascular: Negative for chest pain, palpitations and leg swelling. Gastrointestinal: Negative for abdominal distention, abdominal pain, blood in stool, constipation, diarrhea, nausea and vomiting. Endocrine: Negative for cold intolerance and heat intolerance. Genitourinary: Negative for difficulty urinating, flank pain, frequency and urgency. Musculoskeletal: Negative for arthralgias and myalgias. Neurological: Positive for weakness. Negative for dizziness, syncope, light-headedness and numbness. Reports generalized weakness    Hematological: Does not bruise/bleed easily. Psychiatric/Behavioral: Negative for agitation, confusion and dysphoric mood. 14 point review of systems is negative except as specifically addressed above. Objective:   VITALS:  BP (!) 180/72   Pulse 62   Temp 97.8 °F (36.6 °C) (Temporal)   Resp 15   Ht 5' 6\" (1.676 m)   Wt 147 lb 7 oz (66.9 kg)   SpO2 98%   BMI 23.80 kg/m²   24HR INTAKE/OUTPUT:      Intake/Output Summary (Last 24 hours) at 11/23/2021 1044  Last data filed at 11/23/2021 1003  Gross per 24 hour   Intake 830 ml   Output 3500 ml   Net -2670 ml       Physical Exam  Constitutional:       General: She is not in acute distress. Appearance: Normal appearance. She is ill-appearing (Chronically). She is not toxic-appearing or diaphoretic. HENT:      Head: Normocephalic and atraumatic. Right Ear: External ear normal.      Left Ear: External ear normal. There is impacted cerumen.       Nose: Nose normal. No congestion or rhinorrhea. Mouth/Throat:      Mouth: Mucous membranes are moist.      Comments: Poor dentition. Posterior pharynx without exudate. Eyes:      General: No scleral icterus. Extraocular Movements: Extraocular movements intact. Conjunctiva/sclera: Conjunctivae normal.      Pupils: Pupils are equal, round, and reactive to light. Cardiovascular:      Rate and Rhythm: Normal rate and regular rhythm. Pulses: Normal pulses. Heart sounds: Murmur heard. No friction rub. No gallop. Pulmonary:      Effort: Pulmonary effort is normal. No respiratory distress. Breath sounds: Normal breath sounds. No wheezing, rhonchi or rales. Abdominal:      General: Abdomen is flat. Bowel sounds are normal. There is no distension. Palpations: Abdomen is soft. Tenderness: There is no abdominal tenderness. Musculoskeletal:         General: No swelling. Normal range of motion. Cervical back: Normal range of motion and neck supple. Right lower leg: No edema. Left lower leg: No edema. Comments: Nickel sized palpable nodule noted to left forearm appears to have some bruising. Skin:     General: Skin is warm and dry. Coloration: Skin is pale. Skin is not jaundiced. Findings: No erythema, lesion or rash. Neurological:      General: No focal deficit present. Mental Status: She is alert and oriented to person, place, and time. Mental status is at baseline. Cranial Nerves: No cranial nerve deficit. Sensory: No sensory deficit. Motor: No weakness. Psychiatric:         Mood and Affect: Mood normal.         Behavior: Behavior normal.         Thought Content:  Thought content normal.         Judgment: Judgment normal.             Medications:      dextrose        carbamide peroxide  5 drop Left Ear Once    hydrALAZINE  100 mg Oral 3 times per day    NIFEdipine  90 mg Oral Daily    cloNIDine  1 patch TransDERmal Weekly    insulin lispro  0-12 Units SubCUTAneous TID WC    insulin lispro  0-6 Units SubCUTAneous Nightly    cefTRIAXone (ROCEPHIN) IV  1,000 mg IntraVENous Q24H    heparin (porcine)  5,000 Units SubCUTAneous 3 times per day    DULoxetine  60 mg Oral Daily    hydroCHLOROthiazide  50 mg Oral Daily    insulin glargine  10 Units SubCUTAneous Nightly    isosorbide mononitrate  120 mg Oral Daily    levothyroxine  150 mcg Oral Daily    metoprolol succinate  100 mg Oral QAM AC    rOPINIRole  4 mg Oral Nightly    senna  1 tablet Oral Daily    sevelamer  800 mg Oral TID WC    atorvastatin  20 mg Oral Daily    lisinopril  20 mg Oral BID    insulin regular  10 Units IntraVENous Once     ondansetron, albuterol sulfate HFA, HYDROcodone-acetaminophen, hydrALAZINE, labetalol, acetaminophen **OR** acetaminophen, glucose, dextrose, glucagon (rDNA), dextrose  ADULT DIET; Dysphagia - Soft and Bite Sized; 3 carb choices (45 gm/meal); Low Potassium (Less than 3000 mg/day)     Lab and other Data:     Recent Labs     11/22/21  0258 11/23/21  0234   WBC 4.1* 4.1*   HGB 10.8* 11.4*    132     Recent Labs     11/21/21  0210 11/22/21  0258 11/23/21  0234   * 133* 134*   K 4.2 4.7 4.3   CL 95* 92* 94*   CO2 26 25 26   BUN 11 25* 18   CREATININE 2.8* 4.4* 3.6*   GLUCOSE 247* 171* 195*     No results for input(s): AST, ALT, ALB, BILITOT, ALKPHOS in the last 72 hours. Troponin T: No results for input(s): TROPONINI in the last 72 hours. Pro-BNP: No results for input(s): BNP in the last 72 hours. INR: No results for input(s): INR in the last 72 hours. UA:  Recent Labs     11/21/21  0920   COLORU DARK YELLOW*   PHUR 6.0   WBCUA TNTC*   RBCUA 0-1   BACTERIA 1+*   CLARITYU TURBID*   SPECGRAV 1.014   LEUKOCYTESUR LARGE*   UROBILINOGEN 0.2   BILIRUBINUR Negative   BLOODU MODERATE*   GLUCOSEU Negative     A1C: No results for input(s): LABA1C in the last 72 hours.   ABG:No results for input(s): PHART, WHJ5OIY, PO2ART, EOY1MET, BEART, HGBAE, E3LWOPWA, CARBOXHGBART in the last 72 hours. RAD:   CT Head WO Contrast    Result Date: 11/18/2021  No acute intracranial abnormality. The above results were immediately called to ER physician, Dr. Kain Nazario. Signed by Dr Felicia Sabillon    Result Date: 11/18/2021  Pulmonary vascular congestion/pulmonary edema. Signed by Dr Tg Cevallos    Result Date: 11/18/2021  Atheromatous changes of the common and proximal internal carotid arteries bilaterally with a moderate, 50-69% stenosis of the proximal left internal carotid artery. The remaining carotid and vertebral arteries are unremarkable as detailed above. The NASCET criteria was utilized for estimation of carotid arterial stenosis. Signed by Dr Tg Cevallos    Result Date: 11/18/2021  Normal CT angiography of the head. The NASCET criteria was utilized for estimation of carotid arterial stenosis.  Signed by Dr Royal Tran: Urine did not meet criteria for culture    Assessment/Plan   Principal Problem:    Hypertensive emergency   -Cardene drip   -Increased Procardia   -Increased hydralazine   -Catapres patch applied   -Monitor closely   -As needed medications with parameters    Active Problems:    Type 2 diabetes mellitus with chronic kidney disease on chronic dialysis, with long-term current use of insulin ([facility])   -Continue current insulin regimen    -Glycemia protocol in place   [facility]      Acute encephalopathy   -Mental status improving   -PT, OT, SLP      ESRD (end stage renal disease) on dialysis [facility])   -Nephrology on board for management of dialysis      Palliative care patient   -Noted      Generalized weakness   -PT, OT   -Fall precautions      UTI (urinary tract infection)   -Urine did not meet parameters for culture   -Continue Rocephin at this time  Ear pain   -Debrox drops to left ear    Soft tissue nodule left forearm   -Ultrasound pending   -Radial pulse

## 2021-11-23 NOTE — PROGRESS NOTES
Occupational Therapy   Occupational Therapy Initial Assessment  Date: 2021   Patient Name: Sylvie Reid  MRN: [id number]     : 1970    Date of Service: 2021    Discharge Recommendations:          Assessment   Performance deficits / Impairments: Decreased functional mobility ; Decreased endurance; Decreased ADL status; Decreased balance  Assessment: Will progress as tolerated  Treatment Diagnosis: ESRD, Acute respiratory failure  Prognosis: Good  Decision Making: Low Complexity  REQUIRES OT FOLLOW UP: Yes  Activity Tolerance  Activity Tolerance: Treatment limited secondary to medical complications (free text)           Patient Diagnosis(es): The primary encounter diagnosis was Altered mental status, unspecified altered mental status type. Diagnoses of Hypertensive urgency, ESRD (end stage renal disease) on dialysis ([address] Hyperkalemia, Acute respiratory failure with hypoxia ([address] and Other hypervolemia were also pertinent to this visit. has a past medical history of Arthritis, Chronic kidney disease, stage 5, kidney failure ([address] Closed fracture of right shoulder girdle, with routine healing, subsequent encounter, DM (diabetes mellitus) type II controlled with renal manifestation ([address] Gastroparesis, GERD (gastroesophageal reflux disease), Hemodialysis patient ([address] Hypertension, Hypothyroid, Nasal congestion, Neuropathy, Noncompliance with medications, Palliative care patient, and Restless leg syndrome. has a past surgical history that includes Tubal ligation; pr egd transoral biopsy single/multiple (N/A, 2016); Endometrial ablation (); pr colonoscopy w/biopsy single/multiple (N/A, 10/20/2017); pr egd transoral biopsy single/multiple (N/A, 10/20/2017); Dialysis fistula creation (Left, 2019); hc dialysis catheter (Right, 2019);  Cholecystectomy, laparoscopic; liver biopsy (2018); vascular surgery (Left, ); vascular surgery (10/02/2017); vascular surgery (08/2018); vascular surgery (10/29/2018); vascular surgery (12/19/2018); vascular surgery (03/06/2019); vascular surgery (08/28/2019); vascular surgery (08/28/2019); Colonoscopy (Left, 9/17/2020); Breast surgery (Left, 2008); and Breast surgery (Right, 5/25/2021).     Treatment Diagnosis: ESRD, Acute respiratory failure      Restrictions  Restrictions/Precautions  Restrictions/Precautions: Fall Risk  Required Braces or Orthoses?: No    Subjective   General  Chart Reviewed: Yes  Patient assessed for rehabilitation services?: Yes  Family / Caregiver Present: No  Diagnosis: ESRD, acute respiratory failure  General Comment  Comments: Pt. is slightly nauseated and dizzy but willing to try and work with therapy  Patient Currently in Pain: Denies  Vital Signs  Patient Currently in Pain: Denies  Social/Functional History  Social/Functional History  Lives With: Alone  Type of Home: House  Home Layout: One level  Home Access: Stairs to enter without rails  Entrance Stairs - Number of Steps: a few  Home Equipment: 4 wheeled walker  ADL Assistance: Independent  Ambulation Assistance: Independent  Transfer Assistance: Independent  Additional Comments: reports falls in past, states she has a sore spot on her bottom, HD T,Th, Sat       Objective   Vision: Impaired  Hearing: Within functional limits    Orientation  Overall Orientation Status: Within Normal Limits        ADL  Feeding: Independent  Grooming: Independent  UE Bathing: Supervision  LE Bathing: Contact guard assistance  UE Dressing: Supervision  LE Dressing: Contact guard assistance           Transfers  Stand Step Transfers: Contact guard assistance  Sit to stand: Contact guard assistance     Cognition  Overall Cognitive Status: WFL                 LUE AROM (degrees)  LUE AROM : WFL  RUE AROM (degrees)  RUE AROM : WFL  LUE Strength  Gross LUE Strength: Exceptions to [facility]  L Shoulder Flex: 4-/5  L Shoulder ABduction: 4-/5  L Elbow Flex: 4-/5  L Elbow Ext: 4-/5  RUE Strength  Gross RUE Strength: Exceptions to [facility]  R Shoulder Flex: 4-/5  R Shoulder ABduction: 4-/5  R Elbow Flex: 4-/5  R Elbow Ext: 4-/5                   Plan   Plan  Times per week: 3-5x/week  Times per day: Daily    G-Code     OutComes Score                                                  AM-PAC Score             Goals  Short term goals  Time Frame for Short term goals: 1 week  Short term goal 1: Supervision with LB dsg tasks  Short term goal 2: Supervision with light ambulatory ADLs  Short term goal 3: Supervision with toilet tfers  Long term goals  Long term goal 1: Return to PLOF       Therapy Time   Individual Concurrent Group Co-treatment   Time In           Time Out           Minutes                   Abel Leyden, OT

## 2021-11-23 NOTE — PLAN OF CARE
Ongoing  11/22/2021 1431 by Nata Briseno RN  Outcome: Ongoing     Problem: Fluid Volume:  Goal: Will show no signs or symptoms of fluid imbalance  Description: Will show no signs or symptoms of fluid imbalance  11/23/2021 0133 by Tono Irene LPN  Outcome: Ongoing  11/22/2021 1431 by Nata Briseno RN  Outcome: Ongoing     Problem: Health Behavior:  Goal: Ability to manage health-related needs will improve  Description: Ability to manage health-related needs will improve  11/23/2021 0133 by Tono Irene LPN  Outcome: Ongoing  11/22/2021 1431 by Nata Briseno RN  Outcome: Ongoing  Goal: Identification of resources available to assist in meeting health care needs will improve  Description: Identification of resources available to assist in meeting health care needs will improve  11/23/2021 0133 by Tono Irene LPN  Outcome: Ongoing  11/22/2021 1431 by Nata Briseno RN  Outcome: Ongoing     Problem: Nutritional:  Goal: Ability to identify appropriate dietary choices will improve  Description: Ability to identify appropriate dietary choices will improve  11/23/2021 0133 by Tono Irene LPN  Outcome: Ongoing  11/22/2021 1431 by Nata Briseno RN  Outcome: Ongoing     Problem: Physical Regulation:  Goal: Ability to maintain clinical measurements within normal limits will improve  Description: Ability to maintain clinical measurements within normal limits will improve  11/23/2021 0133 by Toon Irene LPN  Outcome: Ongoing  11/22/2021 1431 by Nata Briseno RN  Outcome: Ongoing  Goal: Complications related to the disease process, condition or treatment will be avoided or minimized  Description: Complications related to the disease process, condition or treatment will be avoided or minimized  11/23/2021 0133 by Tono Irene LPN  Outcome: Ongoing  11/22/2021 1431 by Nata Briseno RN  Outcome: Ongoing     Problem: Sensory:  Goal: General experience of comfort will improve  Description: General experience of comfort will improve  11/23/2021 0133 by [address] Irene LPN  Outcome: Ongoing  11/22/2021 1431 by Christi Castellano RN  Outcome: Ongoing     Problem: Self-Care:  Goal: Ability to participate in self-care as condition permits will improve  Description: Ability to participate in self-care as condition permits will improve  11/23/2021 0133 by [address] Irene LPN  Outcome: Ongoing  11/22/2021 1431 by Christi Castellano RN  Outcome: Ongoing

## 2021-11-23 NOTE — PROGRESS NOTES
Nephrology ([facility]) progress Note      Patient:  Edie Lopez  YOB: 1970  Date of Service: 11/23/2021  MRN: [id number]   Acct: [de-identified]   Primary Care Physician: ROMINA Spencer  Advance Directive: Full Code  Admit Date: 11/18/2021       Hospital Day: 5  Referring Provider: Anny Lind MD    Patient independently seen and examined, Chart, Consults, Notes, Operative notes, Labs, Cardiology, and Radiology studies reviewed as available. Subjective:    Patient is a 69-year-old woman with a past medical history of end-stage renal disease, type 2 diabetes, hypertension and neuropathy. She is admitted with altered mental status and left-sided weakness. Since admission, patient has been more awake and is now moving all extremities. She remained confused at times. She required an ICU stay for hypertensive emergency and received Cardene drip. Renal service is managing her end-stage renal disease. On November 19, the patient was transferred to the medical floor. Her blood pressure continued to be little elevated. She was diagnosed with UTI and was placed on Rocephin. She is s/p dialysis on 11/22.  Today, she is more awake with no new complaints.       Allergies:  Penicillins, Lamisil advanced [terbinafine], Stadol [butorphanol], and Reglan [metoclopramide]    Medicines:  Current Facility-Administered Medications   Medication Dose Route Frequency Provider Last Rate Last Admin    carbamide peroxide (DEBROX) 6.5 % otic solution 5 drop  5 drop Left Ear Once Penelope Nones, APRN - CNP        hydrALAZINE (APRESOLINE) tablet 100 mg  100 mg Oral 3 times per day Penelope Nones, APRN - CNP   100 mg at 11/23/21 0656    NIFEdipine (PROCARDIA XL) extended release tablet 90 mg  90 mg Oral Daily Anny Lind MD   90 mg at 11/23/21 0800    cloNIDine (CATAPRES) 0.3 MG/24HR 1 patch  1 patch TransDERmal Weekly Anny Lind MD   1 patch at 11/22/21 6245    insulin lispro (HUMALOG) injection vial 0-12 Units  0-12 Units SubCUTAneous TID WC Nazario Aguilar APRN   2 Units at 11/22/21 1655    insulin lispro (HUMALOG) injection vial 0-6 Units  0-6 Units SubCUTAneous Nightly Nazario Aguilar APRN   4 Units at 11/22/21 2147    cefTRIAXone (ROCEPHIN) 1,000 mg in sterile water 10 mL IV syringe  1,000 mg IntraVENous Q24H Nazario Aguilar APRN   1,000 mg at 11/23/21 1020    ondansetron (ZOFRAN) injection 4 mg  4 mg IntraVENous Q6H PRN Jemima Sheikh APRN - CNP   4 mg at 11/20/21 1150    heparin (porcine) injection 5,000 Units  5,000 Units SubCUTAneous 3 times per day Malia Holm MD   5,000 Units at 11/23/21 0656    albuterol sulfate  (90 Base) MCG/ACT inhaler 2 puff  2 puff Inhalation Q4H PRN Malia Holm MD        DULoxetine (CYMBALTA) extended release capsule 60 mg  60 mg Oral Daily Malia Holm MD   60 mg at 11/23/21 0800    hydroCHLOROthiazide (HYDRODIURIL) tablet 50 mg  50 mg Oral Daily Malia Holm MD   50 mg at 11/23/21 0800    HYDROcodone-acetaminophen (NORCO) 5-325 MG per tablet 1 tablet  1 tablet Oral Q6H PRN Malia Holm MD   1 tablet at 11/23/21 0655    insulin glargine (LANTUS) injection vial 10 Units  10 Units SubCUTAneous Nightly Malia Holm MD   10 Units at 11/22/21 2147    hydrALAZINE (APRESOLINE) injection 10 mg  10 mg IntraVENous Q6H PRN Malia Holm MD   10 mg at 11/22/21 1326    labetalol (NORMODYNE;TRANDATE) injection 20 mg  20 mg IntraVENous Q4H PRN Malia Holm MD   20 mg at 11/22/21 1630    isosorbide mononitrate (IMDUR) extended release tablet 120 mg  120 mg Oral Daily Malia Holm MD   120 mg at 11/23/21 0800    levothyroxine (SYNTHROID) tablet 150 mcg  150 mcg Oral Daily Malia Holm MD   150 mcg at 11/23/21 0656    metoprolol succinate (TOPROL XL) extended release tablet 100 mg  100 mg Oral LASHAUN DARION Holm MD   100 mg at 11/23/21 0656    rOPINIRole (REQUIP) tablet 4 mg  4 mg Oral Nightly Chicho Jones Debbie Figueroa MD   4 mg at 11/22/21 2148    senna (SENOKOT) tablet 8.6 mg  1 tablet Oral Daily Lisbet Muse MD   8.6 mg at 11/23/21 0800    sevelamer (RENVELA) tablet 800 mg  800 mg Oral TID WC Lisbet Muse MD   800 mg at 11/23/21 0800    atorvastatin (LIPITOR) tablet 20 mg  20 mg Oral Daily Lisbet Muse MD   20 mg at 11/23/21 0800    acetaminophen (TYLENOL) tablet 650 mg  650 mg Oral Q6H PRN Lisbet Muse MD   650 mg at 11/20/21 2115    Or    acetaminophen (TYLENOL) suppository 650 mg  650 mg Rectal Q6H PRN Lisbet Muse MD        lisinopril (PRINIVIL;ZESTRIL) tablet 20 mg  20 mg Oral BID Lisbet Muse MD   20 mg at 11/23/21 0800    insulin regular (HUMULIN R;NOVOLIN R) injection 10 Units  10 Units IntraVENous Once Lisbet Muse MD        glucose (GLUTOSE) 40 % oral gel 15 g  15 g Oral PRN Lisbet Muse MD        dextrose 50 % IV solution  12.5 g IntraVENous PRN Lisbet Muse MD   12.5 g at 11/18/21 2354    glucagon (rDNA) injection 1 mg  1 mg IntraMUSCular PRN Lisbet Muse MD        dextrose 5 % solution  100 mL/hr IntraVENous PRN Lisbet Muse MD           Past Medical History:  Past Medical History:   Diagnosis Date    Arthritis     Chronic kidney disease, stage 5, kidney failure ([facility] [address]     Closed fracture of right shoulder girdle, with routine healing, subsequent encounter     DM (diabetes mellitus) type II controlled with renal manifestation ([facility] [address] 06/1993    Gastroparesis     GERD (gastroesophageal reflux disease)     Hemodialysis patient ([facility] [address]     dialysis on tues, thur, and sat at [facility]    Hypertension     Hypothyroid     Nasal congestion     recent    Neuropathy     Noncompliance with medications     Palliative care patient 10/08/2019    Restless leg syndrome        Past Surgical History:  Past Surgical History:   Procedure Laterality Date    BREAST SURGERY Left 2008    infected milk gland removed    BREAST SURGERY Right 5/25/2021    WEDGE EXCISION RIGHT BREAST DUCT WITH LACRIMAL DUCT PROBE, PEC BLOCK performed by Rod Ann MD at [address], LAPAROSCOPIC      2008    COLONOSCOPY Left 9/17/2020    Dr Cookie Mak hepatic flexure-Severe tortuosity with severe spasming, 1 yr recall    DIALYSIS FISTULA CREATION Left 1/25/2019    REVISION LEFT UPPER EXTREMITY AV ACCESS WITH LEFT BRACHIAL ARTERY TO LEFT AXILLARY VEIN WITH  INTERPOSITIONAL ARTEGRAFT   performed by Vaibhav Navarrete MD at [address]  2012    [address] Right 1/25/2019    INSERTION OF RIGHT INTERNAL JUGULAR HEMODIALYSIS CATHETER performed by Vaibhav Navarrete MD at [address] [facility]  08/17/2018    1.  Moderately increased stainable iron identified by special stain in Kupffer cells and occasional hepatocytes. Negative for evidence of significant portal or lobular inflammation. Negative for evidence of significant fibrosis    NV COLONOSCOPY W/BIOPSY SINGLE/MULTIPLE N/A 10/20/2017    Dr Bere Colon prep-Tubular AP (-) dysplasia x 2--3 yr recall    NV EGD TRANSORAL BIOPSY SINGLE/MULTIPLE N/A 12/27/2016    Dr Royal Pham EGD TRANSORAL BIOPSY SINGLE/MULTIPLE N/A 10/20/2017    Dr Claudeen Sciara (-)   [facility] VASCULAR SURGERY Left 2016    fistula by Dr Karen Kennedy at [address]  10/02/2017    [facility]. Left upper fistulograms/venograms, balloon angioplasty cephalic vein arch 27A02 conquest.    VASCULAR SURGERY  08/2018    FISTULOGRAM    VASCULAR SURGERY  10/29/2018    [facility]. Left upper fistulograms/venograms    VASCULAR SURGERY  12/19/2018    [facility]. Arch aortogram,left upper arteriograms.  VASCULAR SURGERY  03/06/2019    [facility]. Removal of tunneled dialyis catheter right internal jugular vein.  VASCULAR SURGERY  08/28/2019    [facility]. Left upper extremity fistulogram including venography to the superior vena cava. Balloon angioplasty left subclavian vein with 10mm x 40mm conquest balloon. Balloon angioplasty mid/proximal upper arm cephalic vein with 10mm x 40mm conquest balloon. Venograms after balloon angioplasty. Stent left mid/proximal upper arm cephalic vein stenosis fluency 10mm x 60mm self-expanding covered stent. Balloon     VASCULAR SURGERY  2019    cont angioplasty stent with 10mm x 40mm conquest balloon. Completion venograms left upper extremity. Family History  Family History   Problem Relation Age of Onset    Colon Cancer Mother          at 63    Colon Polyps Mother     Lung Cancer Father          at 79    Colon Polyps Father     Stomach Cancer Sister     Breast Cancer Sister 27    Depression Daughter 25        committed suicide    Liver Cancer Neg Hx     Liver Disease Neg Hx     Esophageal Cancer Neg Hx     Rectal Cancer Neg Hx        Social History  Social History     Socioeconomic History    Marital status: Single     Spouse name: Not on file    Number of children: 2    Years of education: Not on file    Highest education level: Not on file   Occupational History    Not on file   Tobacco Use    Smoking status: Current Every Day Smoker     Packs/day: 0.50     Years: 33.00     Pack years: 16.50     Types: Cigarettes    Smokeless tobacco: Never Used    Tobacco comment: NOW at 1/4 PD, started at age 24 and has averaged 2 PPD   Vaping Use    Vaping Use: Never used   Substance and Sexual Activity    Alcohol use: No    Drug use: Not Currently     Types: Marijuana Kale Kos)    Sexual activity: Not Currently     Partners: Male   Other Topics Concern    Not on file   Social History Narrative    Not on file     Social Determinants of Health     Financial Resource Strain:     Difficulty of Paying Living Expenses: Not on file   Food Insecurity:     Worried About Running Out of Food in the Last Year: Not on file    Kendy of Food in the Last Year: Not on file   Transportation Needs:     Lack of Transportation (Medical): Not on file    Lack of Transportation (Non-Medical):  Not on file   Physical Activity:  Days of Exercise per Week: Not on file    Minutes of Exercise per Session: Not on file   Stress:     Feeling of Stress : Not on file   Social Connections:     Frequency of Communication with Friends and Family: Not on file    Frequency of Social Gatherings with Friends and Family: Not on file    Attends Buddhism Services: Not on file    Active Member of Clubs or Organizations: Not on file    Attends Club or Organization Meetings: Not on file    Marital Status: Not on file   Intimate Partner Violence:     Fear of Current or Ex-Partner: Not on file    Emotionally Abused: Not on file    Physically Abused: Not on file    Sexually Abused: Not on file   Housing Stability:     Unable to Pay for Housing in the Last Year: Not on file    Number of Places Lived in the Last Year: Not on file    Unstable Housing in the Last Year: Not on file         Review of Systems:  History obtained from chart review and the patient  General ROS: No fever or chills  Respiratory ROS: No cough, shortness of breath, wheezing  Cardiovascular ROS: no chest pain or dyspnea on exertion  Gastrointestinal ROS: No abdominal pain or melena  Genito-Urinary ROS: No dysuria or hematuria  Musculoskeletal ROS: No joint pain or swelling         Objective:  Patient Vitals for the past 24 hrs:   BP Temp Temp src Pulse Resp SpO2   11/23/21 0638 (!) 180/72 97.8 °F (36.6 °C) Temporal 62 15 98 %   11/23/21 0023 (!) 141/69 99.2 °F (37.3 °C) Temporal 62 16 97 %   11/22/21 1750 (!) 148/61 98.3 °F (36.8 °C)  66 16 98 %   11/22/21 1445 (!) 186/84        11/22/21 1347 (!) 157/65        11/22/21 1307 (!) 179/74        11/22/21 1213 (!) 177/76   63 16 98 %       Intake/Output Summary (Last 24 hours) at 11/23/2021 1051  Last data filed at 11/23/2021 1003  Gross per 24 hour   Intake 830 ml   Output 3500 ml   Net -2670 ml     General: Awake, responsive  Chest: clear to auscultation bilaterally  CVS: regular rate and rhythm  Abdominal: soft, nontender, normal bowel sounds  Extremities: no cyanosis or edema  Skin: warm and dry without rash      Labs:  BMP:   Recent Labs     11/21/21  0210 11/22/21 0258 11/23/21  0234   * 133* 134*   K 4.2 4.7 4.3   CL 95* 92* 94*   CO2 26 25 26   BUN 11 25* 18   CREATININE 2.8* 4.4* 3.6*   CALCIUM 8.6 8.9 8.7     CBC:   Recent Labs     11/22/21 0258 11/23/21  0234   WBC 4.1* 4.1*   HGB 10.8* 11.4*   HCT 35.5* 37.5   MCV 96.7 96.2    132     LIVER PROFILE:   No results for input(s): AST, ALT, LIPASE, BILIDIR, BILITOT, ALKPHOS in the last 72 hours. Invalid input(s): AMYLASE,  ALB  PT/INR:   No results for input(s): PROTIME, INR in the last 72 hours. APTT:   No results for input(s): APTT in the last 72 hours. BNP:  No results for input(s): BNP in the last 72 hours. Ionized Calcium:No results for input(s): IONCA in the last 72 hours. Magnesium:  No results for input(s): MG in the last 72 hours. Phosphorus:No results for input(s): PHOS in the last 72 hours. HgbA1C: No results for input(s): LABA1C in the last 72 hours. Hepatic:   No results for input(s): ALKPHOS, ALT, AST, PROT, BILITOT, BILIDIR, LABALBU in the last 72 hours. Lactic Acid:   No results for input(s): LACTA in the last 72 hours. Troponin: No results for input(s): CKTOTAL, CKMB, TROPONINT in the last 72 hours. ABGs: No results for input(s): PH, PCO2, PO2, HCO3, O2SAT in the last 72 hours. CRP:  No results for input(s): CRP in the last 72 hours. Sed Rate:  No results for input(s): SEDRATE in the last 72 hours. Cultures:   No results for input(s): CULTURE in the last 72 hours. No results for input(s): BC, Aniya Ivanoff in the last 72 hours. No results for input(s): CXSURG in the last 72 hours. Radiology reports as per the Radiologist  Radiology: CT Head WO Contrast    Result Date: 11/18/2021  Examination. CT HEAD WO CONTRAST 11/18/2021 6:50 AM History: Stroke symptoms. Altered mental status. Left-sided weakness.  DLP: 154 mGycm. The CT scan of the head is performed without intravenous contrast enhancement. The images are acquired in axial plane and subsequent reconstruction in coronal and sagittal planes. The comparison is made with the previous study dated 10/21/2020. There is no evidence of a mass. No midline shift. There is no intracranial hemorrhage or hematoma. Moderately dilated ventricles, basal cistern and the cortical sulci are similar to the previous study representing a chronic volume loss/atrophy. The scattered areas of chronic white matter ischemia are seen in the centrum semiovale bilaterally. The gray-white matter differentiation is maintained. The images reviewed in bone window show no acute bony abnormality. The visualized paranasal sinuses and mastoid air cells are clear. No acute intracranial abnormality. The above results were immediately called to ER physician,  [facility]. Signed by Dr Georgie Quintana    Result Date: 11/18/2021  Examination. XR CHEST PORTABLE 11/18/2021 7:15 AM History: Hypertension. Left-sided weakness. A frontal portable upright view of the chest is compared with the previous study dated 5/29/2021. There are persistent interstitial changes in the lungs bilaterally which may represent pulmonary vascular congestion or pulmonary edema. There is no pleural effusion. No pneumothorax. There is moderate fullness of the latha bilaterally which may represent pulmonary arterial hypertension. Heart size is not optimally evaluated due to portable projection. Left subclavian venous stent is seen in place. There is moderate diffuse osteopenia. Pulmonary vascular congestion/pulmonary edema. Signed by Dr Lakshmi Amezcua Date: 11/18/2021  Examination. CTA NECK W CONTRAST 11/18/2021 6:59 AM History: Stroke symptoms. DLP: 297 mGycm. The CT angiography of the neck is performed after intravenous contrast enhancement.  The images are acquired in axial plane proximal left internal carotid artery. The remaining carotid and vertebral arteries are unremarkable as detailed above. The NASCET criteria was utilized for estimation of carotid arterial stenosis. Signed by Dr April Yo    Result Date: 11/18/2021  Examination. CTA HEAD W CONTRAST 11/18/2021 6:53 AM History: Stroke symptoms. DLP: 297 mGycm. The CT angiography of the head is performed after intravenous contrast enhancement. The images are acquired in axial plane and subsequent 2-D reconstruction in coronal and sagittal planes and 3-D maximum intensity projection reconstruction. There is no previous study for comparison. The correlation is made with CT scan of the head obtained earlier today. Normal size internal carotid arteries are seen at the base of the skull, carotid canals and cavernous sinus bilaterally. There are atheromatous plaques in the internal carotid arteries bilaterally in the cavernous sinus without a significant stenosis. No aneurysmal dilatation. Normal size suprasellar internal carotid arteries bilaterally is seen subsequently dividing into normal-appearing anterior and middle cerebral arteries. There is subsequent normal insular and opercular branches. Normal size vertebral arteries enter the foramen magnum and join to make a normal-sized basilar artery which subsequently divides into normal-appearing posterior cerebral arteries bilaterally. Subsequent normal temporal and occipital branches. No areas of focal stenosis or aneurysmal dilatation. Normal CT angiography of the head. The NASCET criteria was utilized for estimation of carotid arterial stenosis.  Signed by Dr Spencer Cortes   -ESRD  -DM2 with nephropathy on long-term insulin  -HTN  -Hyponatremia  -Hyperkalemia  -Secondary Hyperparathyroidism  -Anemia CKD  -Seizure disorder  -Hypothyroidism  -Acute hypoxemic respiratory failure  -Acute pulmonary edema  -Acute metabolic encephalopathy  -Urine tract infection    Plan:  Resume dialysis per outpatient schedule (check with [facility])  Blood pressure trends were improving, adjust meds  Monitor labs. Continue antibiotics.       uJlia Holcomb MD  11/23/21  10:51 AM

## 2021-11-23 NOTE — CARE COORDINATION
Referrals pending with [facility]; will req precert.   Electronically signed by RUBEN Vega on 11/23/2021 at 3:11 PM

## 2021-11-24 LAB
ANION GAP SERPL CALCULATED.3IONS-SCNC: 16 MMOL/L (ref 7–19)
BASOPHILS ABSOLUTE: 0.1 K/UL (ref 0–0.2)
BASOPHILS RELATIVE PERCENT: 2.3 % (ref 0–1)
BUN BLDV-MCNC: 30 MG/DL (ref 6–20)
CALCIUM SERPL-MCNC: 8.9 MG/DL (ref 8.6–10)
CHLORIDE BLD-SCNC: 92 MMOL/L (ref 98–111)
CO2: 25 MMOL/L (ref 22–29)
CREAT SERPL-MCNC: 5.2 MG/DL (ref 0.5–0.9)
EOSINOPHILS ABSOLUTE: 0.3 K/UL (ref 0–0.6)
EOSINOPHILS RELATIVE PERCENT: 7.6 % (ref 0–5)
GFR AFRICAN AMERICAN: 10
GFR NON-AFRICAN AMERICAN: 9
GLUCOSE BLD-MCNC: 115 MG/DL (ref 70–99)
GLUCOSE BLD-MCNC: 209 MG/DL (ref 74–109)
GLUCOSE BLD-MCNC: 231 MG/DL (ref 70–99)
GLUCOSE BLD-MCNC: 256 MG/DL (ref 70–99)
GLUCOSE BLD-MCNC: 295 MG/DL (ref 70–99)
HCT VFR BLD CALC: 37.9 % (ref 37–47)
HEMOGLOBIN: 11.4 G/DL (ref 12–16)
IMMATURE GRANULOCYTES #: 0 K/UL
LYMPHOCYTES ABSOLUTE: 1.2 K/UL (ref 1.1–4.5)
LYMPHOCYTES RELATIVE PERCENT: 31.9 % (ref 20–40)
MCH RBC QN AUTO: 29.2 PG (ref 27–31)
MCHC RBC AUTO-ENTMCNC: 30.1 G/DL (ref 33–37)
MCV RBC AUTO: 96.9 FL (ref 81–99)
MONOCYTES ABSOLUTE: 0.4 K/UL (ref 0–0.9)
MONOCYTES RELATIVE PERCENT: 10.7 % (ref 0–10)
NEUTROPHILS ABSOLUTE: 1.8 K/UL (ref 1.5–7.5)
NEUTROPHILS RELATIVE PERCENT: 47.2 % (ref 50–65)
PDW BLD-RTO: 15.4 % (ref 11.5–14.5)
PERFORMED ON: ABNORMAL
PLATELET # BLD: 131 K/UL (ref 130–400)
PMV BLD AUTO: 9.9 FL (ref 9.4–12.3)
POTASSIUM REFLEX MAGNESIUM: 5.4 MMOL/L (ref 3.5–5)
RBC # BLD: 3.91 M/UL (ref 4.2–5.4)
SODIUM BLD-SCNC: 133 MMOL/L (ref 136–145)
WBC # BLD: 3.8 K/UL (ref 4.8–10.8)

## 2021-11-24 PROCEDURE — 6370000000 HC RX 637 (ALT 250 FOR IP): Performed by: NURSE PRACTITIONER

## 2021-11-24 PROCEDURE — 85025 COMPLETE CBC W/AUTO DIFF WBC: CPT

## 2021-11-24 PROCEDURE — 6370000000 HC RX 637 (ALT 250 FOR IP): Performed by: INTERNAL MEDICINE

## 2021-11-24 PROCEDURE — 2580000003 HC RX 258: Performed by: NURSE PRACTITIONER

## 2021-11-24 PROCEDURE — 36415 COLL VENOUS BLD VENIPUNCTURE: CPT

## 2021-11-24 PROCEDURE — 87086 URINE CULTURE/COLONY COUNT: CPT

## 2021-11-24 PROCEDURE — 6360000002 HC RX W HCPCS: Performed by: INTERNAL MEDICINE

## 2021-11-24 PROCEDURE — 87186 SC STD MICRODIL/AGAR DIL: CPT

## 2021-11-24 PROCEDURE — 1210000000 HC MED SURG R&B

## 2021-11-24 PROCEDURE — 6360000002 HC RX W HCPCS: Performed by: NURSE PRACTITIONER

## 2021-11-24 PROCEDURE — 6370000000 HC RX 637 (ALT 250 FOR IP): Performed by: HOSPITALIST

## 2021-11-24 PROCEDURE — 2500000003 HC RX 250 WO HCPCS: Performed by: NURSE PRACTITIONER

## 2021-11-24 PROCEDURE — 82947 ASSAY GLUCOSE BLOOD QUANT: CPT

## 2021-11-24 PROCEDURE — 8010000000 HC HEMODIALYSIS ACUTE INPT

## 2021-11-24 PROCEDURE — 80048 BASIC METABOLIC PNL TOTAL CA: CPT

## 2021-11-24 RX ADMIN — LISINOPRIL 20 MG: 10 TABLET ORAL at 21:13

## 2021-11-24 RX ADMIN — ATORVASTATIN CALCIUM 20 MG: 40 TABLET, FILM COATED ORAL at 07:38

## 2021-11-24 RX ADMIN — HYDRALAZINE HYDROCHLORIDE 100 MG: 50 TABLET, FILM COATED ORAL at 07:38

## 2021-11-24 RX ADMIN — METOPROLOL SUCCINATE 100 MG: 50 TABLET, EXTENDED RELEASE ORAL at 06:23

## 2021-11-24 RX ADMIN — HEPARIN SODIUM 5000 UNITS: 5000 INJECTION INTRAVENOUS; SUBCUTANEOUS at 21:13

## 2021-11-24 RX ADMIN — HYDROCHLOROTHIAZIDE 50 MG: 25 TABLET ORAL at 07:38

## 2021-11-24 RX ADMIN — SENNOSIDES 8.6 MG: 8.6 TABLET, FILM COATED ORAL at 07:38

## 2021-11-24 RX ADMIN — ONDANSETRON 4 MG: 2 INJECTION INTRAMUSCULAR; INTRAVENOUS at 07:43

## 2021-11-24 RX ADMIN — INSULIN LISPRO 3 UNITS: 100 INJECTION, SOLUTION INTRAVENOUS; SUBCUTANEOUS at 21:14

## 2021-11-24 RX ADMIN — INSULIN LISPRO 6 UNITS: 100 INJECTION, SOLUTION INTRAVENOUS; SUBCUTANEOUS at 17:17

## 2021-11-24 RX ADMIN — CEFTRIAXONE 1000 MG: 1 INJECTION, POWDER, FOR SOLUTION INTRAMUSCULAR; INTRAVENOUS at 12:17

## 2021-11-24 RX ADMIN — NIFEDIPINE 90 MG: 30 TABLET, EXTENDED RELEASE ORAL at 07:39

## 2021-11-24 RX ADMIN — LISINOPRIL 20 MG: 10 TABLET ORAL at 07:39

## 2021-11-24 RX ADMIN — INSULIN GLARGINE 10 UNITS: 100 INJECTION, SOLUTION SUBCUTANEOUS at 21:13

## 2021-11-24 RX ADMIN — ISOSORBIDE MONONITRATE 120 MG: 60 TABLET, EXTENDED RELEASE ORAL at 07:39

## 2021-11-24 RX ADMIN — CLINDAMYCIN IN 5 PERCENT DEXTROSE 300 MG: 6 INJECTION, SOLUTION INTRAVENOUS at 12:17

## 2021-11-24 RX ADMIN — HYDROCODONE BITARTRATE AND ACETAMINOPHEN 1 TABLET: 5; 325 TABLET ORAL at 09:45

## 2021-11-24 RX ADMIN — LEVOTHYROXINE SODIUM 150 MCG: 75 TABLET ORAL at 06:23

## 2021-11-24 RX ADMIN — CLINDAMYCIN IN 5 PERCENT DEXTROSE 300 MG: 6 INJECTION, SOLUTION INTRAVENOUS at 04:07

## 2021-11-24 RX ADMIN — SEVELAMER CARBONATE 800 MG: 800 TABLET, FILM COATED ORAL at 12:18

## 2021-11-24 RX ADMIN — CLINDAMYCIN IN 5 PERCENT DEXTROSE 300 MG: 6 INJECTION, SOLUTION INTRAVENOUS at 17:14

## 2021-11-24 RX ADMIN — INSULIN LISPRO 4 UNITS: 100 INJECTION, SOLUTION INTRAVENOUS; SUBCUTANEOUS at 07:46

## 2021-11-24 RX ADMIN — SEVELAMER CARBONATE 800 MG: 800 TABLET, FILM COATED ORAL at 17:14

## 2021-11-24 RX ADMIN — DULOXETINE 60 MG: 60 CAPSULE, DELAYED RELEASE ORAL at 07:38

## 2021-11-24 RX ADMIN — HYDRALAZINE HYDROCHLORIDE 100 MG: 50 TABLET, FILM COATED ORAL at 14:51

## 2021-11-24 RX ADMIN — ROPINIROLE HYDROCHLORIDE 4 MG: 4 TABLET, FILM COATED ORAL at 21:13

## 2021-11-24 RX ADMIN — HYDRALAZINE HYDROCHLORIDE 10 MG: 20 INJECTION INTRAMUSCULAR; INTRAVENOUS at 18:17

## 2021-11-24 RX ADMIN — HYDRALAZINE HYDROCHLORIDE 100 MG: 50 TABLET, FILM COATED ORAL at 23:51

## 2021-11-24 ASSESSMENT — ENCOUNTER SYMPTOMS
SINUS PAIN: 0
NAUSEA: 0
COUGH: 0
WHEEZING: 0
ABDOMINAL PAIN: 0
BLOOD IN STOOL: 0
CONSTIPATION: 0
DIARRHEA: 0
SINUS PRESSURE: 1
ABDOMINAL DISTENTION: 0
SHORTNESS OF BREATH: 0
SORE THROAT: 1
VOMITING: 0
TROUBLE SWALLOWING: 0

## 2021-11-24 ASSESSMENT — PAIN SCALES - GENERAL
PAINLEVEL_OUTOF10: 8
PAINLEVEL_OUTOF10: 7

## 2021-11-24 NOTE — CARE COORDINATION
[address] both denied. Ariela Foster have a bed until; 11/28  Still pending word from [facility], but if pt is doing better today- she may opt to go home with [facility] services. Will follow up after HD . Pt not medcially ready for dc today.   Electronically signed by Earle Camarillo RN on 11/24/2021 at 11:49 AM

## 2021-11-24 NOTE — PROGRESS NOTES
11/24/21 0943   General Comment   Comments Attempt patient in HD   Physical Therapy    Electronically signed by Adi Ramsay PTA on 11/24/2021 at 9:44 AM

## 2021-11-24 NOTE — PROGRESS NOTES
Nephrology ([facility]) Progress Note    Patient:  Tameka June  YOB: 1970  Date of Service: 11/24/2021  MRN: [id number]   Acct: [de-identified]   Primary Care Physician: ROMINA Izaguirre  Advance Directive: Full Code  Admit Date: 11/18/2021       Hospital Day: 6  Referring Provider: Denis Ellison MD    Patient Seen, Chart, Consults, Notes, Labs, Radiology studies reviewed. Subjective:    Patient is a 65-year-old woman with a past medical history of end-stage renal disease, type 2 diabetes, hypertension and neuropathy.  She is admitted with altered mental status and left-sided weakness.  Since admission, patient has been more awake and is now moving all extremities.  She remained confused at times. Francesco Alexander required an ICU stay for hypertensive emergency and received Cardene drip.  Renal service is managing her end-stage renal disease.  On November 19, she was transferred to the medical floor. Fredie Rash blood pressure continued to be little elevated.  She was diagnosed with UTI and was placed on Rocephin.  She is seen and examined on dialysis today. She remained hypertensive but had no new complaints.         Dialysis   Pt was seen on RRT  Modality: Hemodialysis  Access: Arterial Venous Fistula  Location: left upper  QB: 600  QD: 800  UF: 4 liters    Allergies:  Penicillins, Lamisil advanced [terbinafine], Stadol [butorphanol], and Reglan [metoclopramide]    Medicines:  Current Facility-Administered Medications   Medication Dose Route Frequency Provider Last Rate Last Admin    clindamycin (CLEOCIN) IVPB 300 mg  300 mg IntraVENous Q8H ROMINA Garnica - CNP   Stopped at 11/24/21 0430    HYDROcodone-acetaminophen (NORCO) 5-325 MG per tablet 1 tablet  1 tablet Oral Q4H PRN Lygarcia Cat APRN - CNP   1 tablet at 11/24/21 0945    hydrALAZINE (APRESOLINE) tablet 100 mg  100 mg Oral 3 times per day Kwan Cat APRN - CNP   100 mg at 11/24/21 0738    NIFEdipine (PROCARDIA XL) extended release tablet 90 mg  90 mg Oral Daily Bridger Agarwal MD   90 mg at 11/24/21 0739    cloNIDine (CATAPRES) 0.3 MG/24HR 1 patch  1 patch TransDERmal Weekly Bridger Agarwal MD   1 patch at 11/22/21 1755    insulin lispro (HUMALOG) injection vial 0-12 Units  0-12 Units SubCUTAneous TID WC Merril Mutter, APRN   4 Units at 11/24/21 0746    insulin lispro (HUMALOG) injection vial 0-6 Units  0-6 Units SubCUTAneous Nightly Merril Mutter, APRN   2 Units at 11/23/21 2132    cefTRIAXone (ROCEPHIN) 1,000 mg in sterile water 10 mL IV syringe  1,000 mg IntraVENous Q24H Merril Mutter, APRN   1,000 mg at 11/23/21 1020    ondansetron (ZOFRAN) injection 4 mg  4 mg IntraVENous Q6H PRN ROMINA Hoffmann - CNP   4 mg at 11/24/21 0743    heparin (porcine) injection 5,000 Units  5,000 Units SubCUTAneous 3 times per day Dorothy Travis MD   5,000 Units at 11/23/21 2133    albuterol sulfate  (90 Base) MCG/ACT inhaler 2 puff  2 puff Inhalation Q4H PRN Dorothy Travis MD        DULoxetine (CYMBALTA) extended release capsule 60 mg  60 mg Oral Daily Dorothy Travis MD   60 mg at 11/24/21 0738    hydroCHLOROthiazide (HYDRODIURIL) tablet 50 mg  50 mg Oral Daily Dorothy Travis MD   50 mg at 11/24/21 0738    insulin glargine (LANTUS) injection vial 10 Units  10 Units SubCUTAneous Nightly Dorothy Travis MD   10 Units at 11/23/21 2133    hydrALAZINE (APRESOLINE) injection 10 mg  10 mg IntraVENous Q6H PRN Dorothy Travis MD   10 mg at 11/22/21 1326    labetalol (NORMODYNE;TRANDATE) injection 20 mg  20 mg IntraVENous Q4H PRN Dorothy Travis MD   20 mg at 11/22/21 1630    isosorbide mononitrate (IMDUR) extended release tablet 120 mg  120 mg Oral Daily Dorothy Travis MD   120 mg at 11/24/21 0739    levothyroxine (SYNTHROID) tablet 150 mcg  150 mcg Oral Daily Dorothy Travis MD   150 mcg at 11/24/21 2972    metoprolol succinate (TOPROL XL) extended release tablet 100 mg  100 mg Oral MUNA Garcia Magnus Michelle MD   100 mg at 11/24/21 3493    rOPINIRole (REQUIP) tablet 4 mg  4 mg Oral Nightly Charmaine Humphreys MD   4 mg at 11/23/21 2132    senna (SENOKOT) tablet 8.6 mg  1 tablet Oral Daily Charmaine Humphreys MD   8.6 mg at 11/24/21 2958    sevelamer (RENVELA) tablet 800 mg  800 mg Oral TID WC Charmaine Humphreys MD   800 mg at 11/23/21 1756    atorvastatin (LIPITOR) tablet 20 mg  20 mg Oral Daily Charmaine Humphreys MD   20 mg at 11/24/21 4141    acetaminophen (TYLENOL) tablet 650 mg  650 mg Oral Q6H PRN Charmaine Humphreys MD   650 mg at 11/20/21 2115    Or    acetaminophen (TYLENOL) suppository 650 mg  650 mg Rectal Q6H PRN Charmaine Humphreys MD        lisinopril (PRINIVIL;ZESTRIL) tablet 20 mg  20 mg Oral BID Charmaine Humphreys MD   20 mg at 11/24/21 0739    insulin regular (HUMULIN R;NOVOLIN R) injection 10 Units  10 Units IntraVENous Once Charmaine Humphreys MD        glucose (GLUTOSE) 40 % oral gel 15 g  15 g Oral PRN Charmaine Humphreys MD        dextrose 50 % IV solution  12.5 g IntraVENous PRN Charmaine Humphreys MD   12.5 g at 11/18/21 2634    glucagon (rDNA) injection 1 mg  1 mg IntraMUSCular PRN Charmaine Humphreys MD        dextrose 5 % solution  100 mL/hr IntraVENous PRN Charmaine Humphreys MD           Past Medical History:  Past Medical History:   Diagnosis Date    Arthritis     Chronic kidney disease, stage 5, kidney failure ([facility] [address]     Closed fracture of right shoulder girdle, with routine healing, subsequent encounter     DM (diabetes mellitus) type II controlled with renal manifestation ([facility] [address] 06/1993    Gastroparesis     GERD (gastroesophageal reflux disease)     Hemodialysis patient ([facility] [address]     dialysis on tues, thur, and sat at [facility]    Hypertension     Hypothyroid     Nasal congestion     recent    Neuropathy     Noncompliance with medications     Palliative care patient 10/08/2019    Restless leg syndrome        Past Surgical History:  Past Surgical History:   Procedure Laterality Date    BREAST SURGERY Left 2008 infected milk gland removed    BREAST SURGERY Right 5/25/2021    WEDGE EXCISION RIGHT BREAST DUCT WITH LACRIMAL DUCT PROBE, PEC BLOCK performed by Ching Nielsen MD at [address], LAPAROSCOPIC      2008    COLONOSCOPY Left 9/17/2020    Dr Kvng Juárez hepatic flexure-Severe tortuosity with severe spasming, 1 yr recall    DIALYSIS FISTULA CREATION Left 1/25/2019    REVISION LEFT UPPER EXTREMITY AV ACCESS WITH LEFT BRACHIAL ARTERY TO LEFT AXILLARY VEIN WITH  INTERPOSITIONAL ARTEGRAFT   performed by Julia Gibbs MD at [address]  2012    [address] Right 1/25/2019    INSERTION OF RIGHT INTERNAL JUGULAR HEMODIALYSIS CATHETER performed by Julia Gibbs MD at [facility] [facility]  08/17/2018    1.  Moderately increased stainable iron identified by special stain in Kupffer cells and occasional hepatocytes. Negative for evidence of significant portal or lobular inflammation. Negative for evidence of significant fibrosis    NC COLONOSCOPY W/BIOPSY SINGLE/MULTIPLE N/A 10/20/2017    Dr Aurelio Kohli prep-Tubular AP (-) dysplasia x 2--3 yr recall    NC EGD TRANSORAL BIOPSY SINGLE/MULTIPLE N/A 12/27/2016    Dr Brian Foley EGD TRANSORAL BIOPSY SINGLE/MULTIPLE N/A 10/20/2017    Dr Jj Vernon (-)   [address] [facility] VASCULAR SURGERY Left 2016    fistula by Dr Hue Esqueda at [address]  10/02/2017    [facility]. Left upper fistulograms/venograms, balloon angioplasty cephalic vein arch 98H38 conquest.    VASCULAR SURGERY  08/2018    FISTULOGRAM    VASCULAR SURGERY  10/29/2018    [facility]. Left upper fistulograms/venograms    VASCULAR SURGERY  12/19/2018    [facility]. Arch aortogram,left upper arteriograms.  VASCULAR SURGERY  03/06/2019    [facility]. Removal of tunneled dialyis catheter right internal jugular vein.  VASCULAR SURGERY  08/28/2019    [facility]. Left upper extremity fistulogram including venography to the superior vena cava. Balloon angioplasty left PROTIME, INR in the last 72 hours. APTT: No results for input(s): APTT in the last 72 hours. BNP:  No results for input(s): BNP in the last 72 hours. Ionized Calcium:No results for input(s): IONCA in the last 72 hours. Magnesium:  No results for input(s): MG in the last 72 hours. Phosphorus:No results for input(s): PHOS in the last 72 hours. HgbA1C: No results for input(s): LABA1C in the last 72 hours. Hepatic:   No results for input(s): ALKPHOS, ALT, AST, PROT, BILITOT, BILIDIR, LABALBU in the last 72 hours. Lactic Acid: No results for input(s): LACTA in the last 72 hours. Troponin: No results for input(s): CKTOTAL, CKMB, TROPONINT in the last 72 hours. ABGs: No results for input(s): PH, PCO2, PO2, HCO3, O2SAT in the last 72 hours. CRP:  No results for input(s): CRP in the last 72 hours. Sed Rate:  No results for input(s): SEDRATE in the last 72 hours. Cultures:   No results for input(s): CULTURE in the last 72 hours. No results for input(s): BC, Marinus Risk in the last 72 hours. No results for input(s): CXSURG in the last 72 hours. Radiology reports as per the Radiologist  Radiology: CT Head WO Contrast    Result Date: 11/18/2021  Examination. CT HEAD WO CONTRAST 11/18/2021 6:50 AM History: Stroke symptoms. Altered mental status. Left-sided weakness. DLP: 619 mGycm. The CT scan of the head is performed without intravenous contrast enhancement. The images are acquired in axial plane and subsequent reconstruction in coronal and sagittal planes. The comparison is made with the previous study dated 10/21/2020. There is no evidence of a mass. No midline shift. There is no intracranial hemorrhage or hematoma. Moderately dilated ventricles, basal cistern and the cortical sulci are similar to the previous study representing a chronic volume loss/atrophy. The scattered areas of chronic white matter ischemia are seen in the centrum semiovale bilaterally.  The gray-white matter differentiation is maintained. The images reviewed in bone window show no acute bony abnormality. The visualized paranasal sinuses and mastoid air cells are clear. No acute intracranial abnormality. The above results were immediately called to ER physician, Dr. Alia Atkins. Signed by Dr Yg Berman    Result Date: 11/18/2021  Examination. XR CHEST PORTABLE 11/18/2021 7:15 AM History: Hypertension. Left-sided weakness. A frontal portable upright view of the chest is compared with the previous study dated 5/29/2021. There are persistent interstitial changes in the lungs bilaterally which may represent pulmonary vascular congestion or pulmonary edema. There is no pleural effusion. No pneumothorax. There is moderate fullness of the latha bilaterally which may represent pulmonary arterial hypertension. Heart size is not optimally evaluated due to portable projection. Left subclavian venous stent is seen in place. There is moderate diffuse osteopenia. Pulmonary vascular congestion/pulmonary edema. Signed by Dr Rachel Cosme Date: 11/18/2021  Examination. CTA NECK W CONTRAST 11/18/2021 6:59 AM History: Stroke symptoms. DLP: 297 mGycm. The CT angiography of the neck is performed after intravenous contrast enhancement. The images are acquired in axial plane with subsequent 2-D reconstruction in coronal and sagittal planes and 3-D maximum intensity projection reconstruction. There is no previous similar study for comparison. Atheromatous changes of the limited visualized aortic arch is noted. Mild atheromatous changes of the origin of the brachiocephalic trunk is seen. No stenosis. It divides into normal-appearing right common carotid and subclavian arteries. Normal origin of the left common carotid artery seen. A large calcific plaque is seen at the origin of the left subclavian artery with less than 50% focal stenosis at the origin.  Incidentally noted is a left subclavian venous stent in place which is patent. Both common carotid arteries are patent and most of the course. There is a calcific plaque along the anterior aspect of the mid left common carotid artery without stenosis. There is a large plaque at the bifurcation of the left common carotid artery which extends posteriorly into the proximal carotid bulb with 50-69% stenosis of the carotid bulb. It extends into the proximal internal carotid artery with less than 50% stenosis of the left internal carotid artery. The remaining left internal carotid artery is normal in caliber with moderate tortuous course. There is a large plaque along the posterior right lateral aspect of the distal right common carotid artery which extends into the carotid bulb with without a significant stenosis of the bulb. Normal size moderately tortuous right internal carotid artery seen. No stenosis. There is a small calcific plaque at the origin of the right vertebral artery from the right subclavian vein. No significant stenosis. There is normal origin of the left vertebral artery. Normal vertebral arteries bilaterally is seen in the rest of the course in the neck. No focal stenosis or aneurysmal dilatation. Atheromatous changes of the common and proximal internal carotid arteries bilaterally with a moderate, 50-69% stenosis of the proximal left internal carotid artery. The remaining carotid and vertebral arteries are unremarkable as detailed above. The NASCET criteria was utilized for estimation of carotid arterial stenosis. Signed by Dr Jb Riggins    Result Date: 11/18/2021  Examination. CTA HEAD W CONTRAST 11/18/2021 6:53 AM History: Stroke symptoms. DLP: 297 mGycm. The CT angiography of the head is performed after intravenous contrast enhancement. The images are acquired in axial plane and subsequent 2-D reconstruction in coronal and sagittal planes and 3-D maximum intensity projection reconstruction.  There is no previous study for comparison. The correlation is made with CT scan of the head obtained earlier today. Normal size internal carotid arteries are seen at the base of the skull, carotid canals and cavernous sinus bilaterally. There are atheromatous plaques in the internal carotid arteries bilaterally in the cavernous sinus without a significant stenosis. No aneurysmal dilatation. Normal size suprasellar internal carotid arteries bilaterally is seen subsequently dividing into normal-appearing anterior and middle cerebral arteries. There is subsequent normal insular and opercular branches. Normal size vertebral arteries enter the foramen magnum and join to make a normal-sized basilar artery which subsequently divides into normal-appearing posterior cerebral arteries bilaterally. Subsequent normal temporal and occipital branches. No areas of focal stenosis or aneurysmal dilatation. Normal CT angiography of the head. The NASCET criteria was utilized for estimation of carotid arterial stenosis. Signed by Dr Ratliff Estimable     -ESRD  -DM2 with nephropathy on long-term insulin  -HTN  -Hyponatremia  -Hyperkalemia  -Secondary Hyperparathyroidism  -Anemia CKD  -Seizure disorder  -Hypothyroidism  -Acute hypoxemic respiratory failure  -Acute pulmonary edema  -Metabolic encephalopathy  -Urine tract infection    Plan:  Dialysis as above, low K diet, follow up labs. Continue antibiotics. Continue current BP meds. UF goal was increased for better BP control.     Sindhu Taylor MD, MD  11/24/21  10:07 AM

## 2021-11-24 NOTE — CARE COORDINATION
Pt back from HD, feeling much better and wants to go home eith HH instead of snf.     Electronically signed by Bari Hough RN on 11/24/2021 at 1:56 PM

## 2021-11-25 VITALS
OXYGEN SATURATION: 97 % | WEIGHT: 146.8 LBS | RESPIRATION RATE: 16 BRPM | SYSTOLIC BLOOD PRESSURE: 161 MMHG | TEMPERATURE: 98.4 F | HEIGHT: 66 IN | BODY MASS INDEX: 23.59 KG/M2 | HEART RATE: 65 BPM | DIASTOLIC BLOOD PRESSURE: 69 MMHG

## 2021-11-25 LAB
ANION GAP SERPL CALCULATED.3IONS-SCNC: 14 MMOL/L (ref 7–19)
BASOPHILS ABSOLUTE: 0.1 K/UL (ref 0–0.2)
BASOPHILS RELATIVE PERCENT: 2.3 % (ref 0–1)
BUN BLDV-MCNC: 20 MG/DL (ref 6–20)
CALCIUM SERPL-MCNC: 8.7 MG/DL (ref 8.6–10)
CHLORIDE BLD-SCNC: 92 MMOL/L (ref 98–111)
CO2: 25 MMOL/L (ref 22–29)
CREAT SERPL-MCNC: 3.8 MG/DL (ref 0.5–0.9)
EOSINOPHILS ABSOLUTE: 0.3 K/UL (ref 0–0.6)
EOSINOPHILS RELATIVE PERCENT: 7.3 % (ref 0–5)
GFR AFRICAN AMERICAN: 15
GFR NON-AFRICAN AMERICAN: 12
GLUCOSE BLD-MCNC: 169 MG/DL (ref 70–99)
GLUCOSE BLD-MCNC: 229 MG/DL (ref 70–99)
GLUCOSE BLD-MCNC: 287 MG/DL (ref 74–109)
HCT VFR BLD CALC: 39.3 % (ref 37–47)
HEMOGLOBIN: 11.6 G/DL (ref 12–16)
IMMATURE GRANULOCYTES #: 0 K/UL
LYMPHOCYTES ABSOLUTE: 1 K/UL (ref 1.1–4.5)
LYMPHOCYTES RELATIVE PERCENT: 24.5 % (ref 20–40)
MCH RBC QN AUTO: 28.7 PG (ref 27–31)
MCHC RBC AUTO-ENTMCNC: 29.5 G/DL (ref 33–37)
MCV RBC AUTO: 97.3 FL (ref 81–99)
MONOCYTES ABSOLUTE: 0.5 K/UL (ref 0–0.9)
MONOCYTES RELATIVE PERCENT: 12.3 % (ref 0–10)
NEUTROPHILS ABSOLUTE: 2.1 K/UL (ref 1.5–7.5)
NEUTROPHILS RELATIVE PERCENT: 53.3 % (ref 50–65)
PDW BLD-RTO: 15.3 % (ref 11.5–14.5)
PERFORMED ON: ABNORMAL
PERFORMED ON: ABNORMAL
PLATELET # BLD: 146 K/UL (ref 130–400)
PMV BLD AUTO: 9.2 FL (ref 9.4–12.3)
POTASSIUM REFLEX MAGNESIUM: 4.6 MMOL/L (ref 3.5–5)
RBC # BLD: 4.04 M/UL (ref 4.2–5.4)
SODIUM BLD-SCNC: 131 MMOL/L (ref 136–145)
WBC # BLD: 4 K/UL (ref 4.8–10.8)

## 2021-11-25 PROCEDURE — 6360000002 HC RX W HCPCS: Performed by: NURSE PRACTITIONER

## 2021-11-25 PROCEDURE — 6370000000 HC RX 637 (ALT 250 FOR IP): Performed by: NURSE PRACTITIONER

## 2021-11-25 PROCEDURE — 2580000003 HC RX 258: Performed by: NURSE PRACTITIONER

## 2021-11-25 PROCEDURE — 85025 COMPLETE CBC W/AUTO DIFF WBC: CPT

## 2021-11-25 PROCEDURE — 82947 ASSAY GLUCOSE BLOOD QUANT: CPT

## 2021-11-25 PROCEDURE — 36415 COLL VENOUS BLD VENIPUNCTURE: CPT

## 2021-11-25 PROCEDURE — 6360000002 HC RX W HCPCS: Performed by: INTERNAL MEDICINE

## 2021-11-25 PROCEDURE — 80048 BASIC METABOLIC PNL TOTAL CA: CPT

## 2021-11-25 PROCEDURE — 6370000000 HC RX 637 (ALT 250 FOR IP): Performed by: HOSPITALIST

## 2021-11-25 PROCEDURE — 6370000000 HC RX 637 (ALT 250 FOR IP): Performed by: INTERNAL MEDICINE

## 2021-11-25 PROCEDURE — 2500000003 HC RX 250 WO HCPCS: Performed by: NURSE PRACTITIONER

## 2021-11-25 RX ORDER — CLONIDINE 0.3 MG/24H
1 PATCH, EXTENDED RELEASE TRANSDERMAL WEEKLY
Qty: 4 PATCH | Refills: 3 | Status: ON HOLD | OUTPATIENT
Start: 2021-11-29 | End: 2021-12-22 | Stop reason: HOSPADM

## 2021-11-25 RX ORDER — METOPROLOL SUCCINATE 100 MG/1
100 TABLET, EXTENDED RELEASE ORAL
Qty: 30 TABLET | Refills: 3 | Status: SHIPPED | OUTPATIENT
Start: 2021-11-26

## 2021-11-25 RX ORDER — CLINDAMYCIN HYDROCHLORIDE 300 MG/1
300 CAPSULE ORAL 3 TIMES DAILY
Qty: 21 CAPSULE | Refills: 0 | Status: SHIPPED | OUTPATIENT
Start: 2021-11-25 | End: 2021-12-02

## 2021-11-25 RX ORDER — NIFEDIPINE 30 MG/1
90 TABLET, EXTENDED RELEASE ORAL DAILY
Qty: 30 TABLET | Refills: 3 | Status: SHIPPED | OUTPATIENT
Start: 2021-11-26

## 2021-11-25 RX ORDER — MINOXIDIL 2.5 MG/1
5 TABLET ORAL ONCE
Status: COMPLETED | OUTPATIENT
Start: 2021-11-25 | End: 2021-11-25

## 2021-11-25 RX ORDER — HYDRALAZINE HYDROCHLORIDE 100 MG/1
100 TABLET, FILM COATED ORAL EVERY 8 HOURS SCHEDULED
Qty: 90 TABLET | Refills: 3 | Status: SHIPPED | OUTPATIENT
Start: 2021-11-25

## 2021-11-25 RX ORDER — ISOSORBIDE MONONITRATE 120 MG/1
120 TABLET, EXTENDED RELEASE ORAL DAILY
Qty: 30 TABLET | Refills: 3 | Status: SHIPPED | OUTPATIENT
Start: 2021-11-26

## 2021-11-25 RX ADMIN — LISINOPRIL 20 MG: 10 TABLET ORAL at 08:14

## 2021-11-25 RX ADMIN — INSULIN LISPRO 4 UNITS: 100 INJECTION, SOLUTION INTRAVENOUS; SUBCUTANEOUS at 08:20

## 2021-11-25 RX ADMIN — CLINDAMYCIN IN 5 PERCENT DEXTROSE 300 MG: 6 INJECTION, SOLUTION INTRAVENOUS at 08:15

## 2021-11-25 RX ADMIN — HEPARIN SODIUM 5000 UNITS: 5000 INJECTION INTRAVENOUS; SUBCUTANEOUS at 05:49

## 2021-11-25 RX ADMIN — SEVELAMER CARBONATE 800 MG: 800 TABLET, FILM COATED ORAL at 08:14

## 2021-11-25 RX ADMIN — HYDROCHLOROTHIAZIDE 50 MG: 25 TABLET ORAL at 08:15

## 2021-11-25 RX ADMIN — ACETAMINOPHEN 650 MG: 325 TABLET ORAL at 02:53

## 2021-11-25 RX ADMIN — HYDRALAZINE HYDROCHLORIDE 10 MG: 20 INJECTION INTRAMUSCULAR; INTRAVENOUS at 03:05

## 2021-11-25 RX ADMIN — SEVELAMER CARBONATE 800 MG: 800 TABLET, FILM COATED ORAL at 11:26

## 2021-11-25 RX ADMIN — HYDRALAZINE HYDROCHLORIDE 100 MG: 50 TABLET, FILM COATED ORAL at 08:17

## 2021-11-25 RX ADMIN — INSULIN LISPRO 2 UNITS: 100 INJECTION, SOLUTION INTRAVENOUS; SUBCUTANEOUS at 11:26

## 2021-11-25 RX ADMIN — CEFTRIAXONE 1000 MG: 1 INJECTION, POWDER, FOR SOLUTION INTRAMUSCULAR; INTRAVENOUS at 11:25

## 2021-11-25 RX ADMIN — SENNOSIDES 8.6 MG: 8.6 TABLET, FILM COATED ORAL at 08:14

## 2021-11-25 RX ADMIN — LEVOTHYROXINE SODIUM 150 MCG: 75 TABLET ORAL at 05:49

## 2021-11-25 RX ADMIN — ISOSORBIDE MONONITRATE 120 MG: 60 TABLET, EXTENDED RELEASE ORAL at 08:14

## 2021-11-25 RX ADMIN — ATORVASTATIN CALCIUM 20 MG: 40 TABLET, FILM COATED ORAL at 08:14

## 2021-11-25 RX ADMIN — ONDANSETRON 4 MG: 2 INJECTION INTRAMUSCULAR; INTRAVENOUS at 02:59

## 2021-11-25 RX ADMIN — METOPROLOL SUCCINATE 100 MG: 50 TABLET, EXTENDED RELEASE ORAL at 05:49

## 2021-11-25 RX ADMIN — NIFEDIPINE 90 MG: 30 TABLET, EXTENDED RELEASE ORAL at 08:14

## 2021-11-25 RX ADMIN — DULOXETINE 60 MG: 60 CAPSULE, DELAYED RELEASE ORAL at 08:15

## 2021-11-25 RX ADMIN — ONDANSETRON 4 MG: 2 INJECTION INTRAMUSCULAR; INTRAVENOUS at 11:25

## 2021-11-25 RX ADMIN — MINOXIDIL 5 MG: 2.5 TABLET ORAL at 11:25

## 2021-11-25 RX ADMIN — CLINDAMYCIN IN 5 PERCENT DEXTROSE 300 MG: 6 INJECTION, SOLUTION INTRAVENOUS at 01:40

## 2021-11-25 RX ADMIN — HYDRALAZINE HYDROCHLORIDE 10 MG: 20 INJECTION INTRAMUSCULAR; INTRAVENOUS at 11:25

## 2021-11-25 ASSESSMENT — ENCOUNTER SYMPTOMS
NAUSEA: 0
SINUS PRESSURE: 0
VOMITING: 0
SINUS PAIN: 0
ABDOMINAL DISTENTION: 0
COUGH: 0
BLOOD IN STOOL: 0
WHEEZING: 0
CONSTIPATION: 0
ABDOMINAL PAIN: 0
SORE THROAT: 0
TROUBLE SWALLOWING: 0
SHORTNESS OF BREATH: 0
DIARRHEA: 0

## 2021-11-25 ASSESSMENT — PAIN SCALES - GENERAL: PAINLEVEL_OUTOF10: 8

## 2021-11-25 NOTE — DISCHARGE SUMMARY
Mukesh Garcia  :  1970  MRN:  [id number]    Admit date:  2021  Discharge date:  21    Discharging Physician:  Dr. Gurdeep Ruelas Directive: Full Code    Consults: IP CONSULT TO [place] [address]  IP CONSULT TO CASE MANAGEMENT  IP CONSULT TO CASE MANAGEMENT     Primary Care Physician:  ROMINA Medley    Discharge Diagnoses:  Principal Problem:    Hypertensive emergency  Active Problems:    Type 2 diabetes mellitus with chronic kidney disease on chronic dialysis, with long-term current use of insulin ([facility] [address]    Acute encephalopathy    ESRD (end stage renal disease) on dialysis [facility])    Palliative care patient    Generalized weakness    UTI (urinary tract infection)  Resolved Problems:    * No resolved hospital problems. *      Portions of this note have been copied forward, however, changed to reflect the most current clinical status of this patient. Hospital Course: The patient is a 61year old female with past medical history of ESRD, DM, hypertension and neuropathy, who presented to [facility] ED from dialysis center with complaints of altered mental status and left-sided weakness.  Last well-known night before admission. Rosa Piña was unable to provide HPI information on arrival. [facility] obtained from review of chart.  Dialysis staff reported patient having altered mental status.  Patient was moving all four extremities on admission but remained confused. Rosa Piña was admitted to ICU for hypertensive emergency, started on Cardene drip.  Nephrology was consulted to manage ESRD.  Patient was weaned off Cardene drip and transferred to floor on 2021.  Blood pressure remained mildly elevated, nephrology adjusted medications. UA taken on 21 +leukocytes 1+bacteria Rocephin started, did not meet criteria for reflex for culture. Is alert and oriented at this time. She states she feels weak and fatigued. Procardia and Apresoline adjusted. PT OT evaluations in process.   Case management to assist in discharge planning, patient inquiring about possible skilled rehab. Patient noted small nodule to left forearm, soft tissue ultrasound of the left upper extremity no indication of blood clot. Left ear canal appeared to be impacted, Debrox drops ordered, canal clear and TM grey and pearly today. Reports improvement in ear pain. Continued to have some dental pain, has multiple missing and broken teeth, however there is no redness or swelling noted in the gum. Cleocin initiated for possible dental infection. Ear and mouth pain now resolved. Systolic blood pressures still remained over 180,  Minoxidil given per nephrology. Blood pressure responded appropriately. Patient is now medically stable to be discharged home with home health. Patient prescribed adjustment of blood pressure medications and oral antibiotics. Significant Diagnostic Studies:   CT Head WO Contrast    Result Date: 11/18/2021  Examination. CT HEAD WO CONTRAST 11/18/2021 6:50 AM History: Stroke symptoms. Altered mental status. Left-sided weakness. DLP: 619 mGycm. The CT scan of the head is performed without intravenous contrast enhancement. The images are acquired in axial plane and subsequent reconstruction in coronal and sagittal planes. The comparison is made with the previous study dated 10/21/2020. There is no evidence of a mass. No midline shift. There is no intracranial hemorrhage or hematoma. Moderately dilated ventricles, basal cistern and the cortical sulci are similar to the previous study representing a chronic volume loss/atrophy. The scattered areas of chronic white matter ischemia are seen in the centrum semiovale bilaterally. The gray-white matter differentiation is maintained. The images reviewed in bone window show no acute bony abnormality. The visualized paranasal sinuses and mastoid air cells are clear. No acute intracranial abnormality.  The above results were immediately called to ER physician, Dr. Abdirashid Oropeza. Signed by Dr Av Winchester    Result Date: 11/18/2021  Examination. XR CHEST PORTABLE 11/18/2021 7:15 AM History: Hypertension. Left-sided weakness. A frontal portable upright view of the chest is compared with the previous study dated 5/29/2021. There are persistent interstitial changes in the lungs bilaterally which may represent pulmonary vascular congestion or pulmonary edema. There is no pleural effusion. No pneumothorax. There is moderate fullness of the latha bilaterally which may represent pulmonary arterial hypertension. Heart size is not optimally evaluated due to portable projection. Left subclavian venous stent is seen in place. There is moderate diffuse osteopenia. Pulmonary vascular congestion/pulmonary edema. Signed by Dr Jess Royal Date: 11/18/2021  Examination. CTA NECK W CONTRAST 11/18/2021 6:59 AM History: Stroke symptoms. DLP: 297 mGycm. The CT angiography of the neck is performed after intravenous contrast enhancement. The images are acquired in axial plane with subsequent 2-D reconstruction in coronal and sagittal planes and 3-D maximum intensity projection reconstruction. There is no previous similar study for comparison. Atheromatous changes of the limited visualized aortic arch is noted. Mild atheromatous changes of the origin of the brachiocephalic trunk is seen. No stenosis. It divides into normal-appearing right common carotid and subclavian arteries. Normal origin of the left common carotid artery seen. A large calcific plaque is seen at the origin of the left subclavian artery with less than 50% focal stenosis at the origin. Incidentally noted is a left subclavian venous stent in place which is patent. Both common carotid arteries are patent and most of the course. There is a calcific plaque along the anterior aspect of the mid left common carotid artery without stenosis.  There is a large plaque at the bifurcation of the left common carotid artery which extends posteriorly into the proximal carotid bulb with 50-69% stenosis of the carotid bulb. It extends into the proximal internal carotid artery with less than 50% stenosis of the left internal carotid artery. The remaining left internal carotid artery is normal in caliber with moderate tortuous course. There is a large plaque along the posterior right lateral aspect of the distal right common carotid artery which extends into the carotid bulb with without a significant stenosis of the bulb. Normal size moderately tortuous right internal carotid artery seen. No stenosis. There is a small calcific plaque at the origin of the right vertebral artery from the right subclavian vein. No significant stenosis. There is normal origin of the left vertebral artery. Normal vertebral arteries bilaterally is seen in the rest of the course in the neck. No focal stenosis or aneurysmal dilatation. Atheromatous changes of the common and proximal internal carotid arteries bilaterally with a moderate, 50-69% stenosis of the proximal left internal carotid artery. The remaining carotid and vertebral arteries are unremarkable as detailed above. The NASCET criteria was utilized for estimation of carotid arterial stenosis. Signed by Dr Ruby Briscoe    Result Date: 11/18/2021  Examination. CTA HEAD W CONTRAST 11/18/2021 6:53 AM History: Stroke symptoms. DLP: 297 mGycm. The CT angiography of the head is performed after intravenous contrast enhancement. The images are acquired in axial plane and subsequent 2-D reconstruction in coronal and sagittal planes and 3-D maximum intensity projection reconstruction. There is no previous study for comparison. The correlation is made with CT scan of the head obtained earlier today. Normal size internal carotid arteries are seen at the base of the skull, carotid canals and cavernous sinus bilaterally.  There are atheromatous plaques in the internal carotid arteries bilaterally in the cavernous sinus without a significant stenosis. No aneurysmal dilatation. Normal size suprasellar internal carotid arteries bilaterally is seen subsequently dividing into normal-appearing anterior and middle cerebral arteries. There is subsequent normal insular and opercular branches. Normal size vertebral arteries enter the foramen magnum and join to make a normal-sized basilar artery which subsequently divides into normal-appearing posterior cerebral arteries bilaterally. Subsequent normal temporal and occipital branches. No areas of focal stenosis or aneurysmal dilatation. Normal CT angiography of the head. The NASCET criteria was utilized for estimation of carotid arterial stenosis. Signed by Dr Stanley Showers:   CBC:   Recent Labs     11/23/21  0234 11/24/21  0205 11/25/21  0250   WBC 4.1* 3.8* 4.0*   HGB 11.4* 11.4* 11.6*    131 146     BMP:    Recent Labs     11/23/21 0234 11/24/21  0205 11/25/21  0250   * 133* 131*   K 4.3 5.4* 4.6   CL 94* 92* 92*   CO2 26 25 25   BUN 18 30* 20   CREATININE 3.6* 5.2* 3.8*   GLUCOSE 195* 209* 287*     INR: No results for input(s): INR in the last 72 hours. Physical Exam:  Vital Signs: BP (!) 161/69   Pulse 65   Temp 98.4 °F (36.9 °C)   Resp 16   Ht 5' 6\" (1.676 m)   Wt 146 lb 12.8 oz (66.6 kg)   SpO2 97%   BMI 23.69 kg/m²   Physical Exam  Constitutional:       General: She is not in acute distress. Appearance: Normal appearance. She is ill-appearing (Chronically). She is not toxic-appearing or diaphoretic. HENT:      Head: Normocephalic and atraumatic. Right Ear: External ear normal.      Left Ear: External ear normal. There is no impacted cerumen. Nose: Nose normal. No congestion or rhinorrhea. Mouth/Throat:      Mouth: Mucous membranes are moist.      Comments: Poor dentition. Posterior pharynx without exudate.   Eyes:      General: No scleral icterus. Extraocular Movements: Extraocular movements intact. Conjunctiva/sclera: Conjunctivae normal.      Pupils: Pupils are equal, round, and reactive to light. Cardiovascular:      Rate and Rhythm: Normal rate and regular rhythm. Pulses: Normal pulses. Heart sounds: Murmur heard. No friction rub. No gallop. Pulmonary:      Effort: Pulmonary effort is normal. No respiratory distress. Breath sounds: Normal breath sounds. No wheezing, rhonchi or rales. Abdominal:      General: Abdomen is flat. Bowel sounds are normal. There is no distension. Palpations: Abdomen is soft. Tenderness: There is no abdominal tenderness. Musculoskeletal:         General: No swelling. Normal range of motion. Cervical back: Normal range of motion and neck supple. Right lower leg: No edema. Left lower leg: No edema. Comments: Nickel sized palpable nodule noted to left forearm appears to have some bruising. Skin:     General: Skin is warm and dry. Coloration: Skin is not jaundiced or pale. Findings: No erythema, lesion or rash. Neurological:      General: No focal deficit present. Mental Status: She is alert and oriented to person, place, and time. Mental status is at baseline. Cranial Nerves: No cranial nerve deficit. Sensory: No sensory deficit. Motor: No weakness. Psychiatric:         Mood and Affect: Mood normal.         Behavior: Behavior normal.         Thought Content:  Thought content normal.         Judgment: Judgment normal.         Discharge Medications:         Medication List      START taking these medications    clindamycin 300 MG capsule  Commonly known as: Cleocin  Take 1 capsule by mouth 3 times daily for 7 days     cloNIDine 0.3 MG/24HR Ptwk  Commonly known as: CATAPRES  Place 1 patch onto the skin once a week  Start taking on: November 29, 2021     NIFEdipine 30 MG extended release tablet  Commonly known as: Kale Enma XL  Take 3 tablets by mouth daily  Start taking on: November 26, 2021  Replaces: NIFEdipine 60 MG extended release tablet        CHANGE how you take these medications    hydrALAZINE 100 MG tablet  Commonly known as: APRESOLINE  Take 1 tablet by mouth every 8 hours  What changed:   · medication strength  · how much to take        CONTINUE taking these medications    albuterol sulfate  (90 Base) MCG/ACT inhaler     Basaglar KwikPen 100 UNIT/ML injection pen  Generic drug: insulin glargine     DULoxetine 60 MG extended release capsule  Commonly known as: CYMBALTA     hydroCHLOROthiazide 25 MG tablet  Commonly known as: HYDRODIURIL  Take 2 tablets by mouth daily     HYDROcodone-acetaminophen 5-325 MG per tablet  Commonly known as: Norco  Take 1 tablet by mouth every 6 hours as needed for Pain.     isosorbide mononitrate 120 MG extended release tablet  Commonly known as: IMDUR  Take 1 tablet by mouth daily  Start taking on: November 26, 2021     levothyroxine 150 MCG tablet  Commonly known as: SYNTHROID     lisinopril 20 MG tablet  Commonly known as: PRINIVIL;ZESTRIL  Take 1 tablet by mouth 2 times daily     metoprolol succinate 100 MG extended release tablet  Commonly known as: TOPROL XL  Take 1 tablet by mouth every morning (before breakfast)  Start taking on: November 26, 2021     NovoLOG FlexPen 100 UNIT/ML injection pen  Generic drug: insulin aspart  Inject 10 Units into the skin 3 times daily Indications: Diabetes     nystatin 902952 UNIT/GM cream  Commonly known as: MYCOSTATIN  Apply topically 2 times daily.      nystatin 123291 UNIT/ML suspension  Commonly known as: MYCOSTATIN  Take 5 mLs by mouth 4 times daily     rOPINIRole 2 MG tablet  Commonly known as: REQUIP     Senna-Lax 8.6 MG tablet  Generic drug: senna     sevelamer 800 MG tablet  Commonly known as: RENVELA     simvastatin 40 MG tablet  Commonly known as: ZOCOR     Vitamin D2 10 MCG (400 UNIT) Tabs        STOP taking these medications    cloNIDine 0.3 MG tablet  Commonly known as: CATAPRES     NIFEdipine 60 MG extended release tablet  Commonly known as: ADALAT CC  Replaced by: NIFEdipine 30 MG extended release tablet           Where to Get Your Medications      These medications were sent to [id number], [address] [place]  [address] [facility], [place] [address]    Phone: [phone]   · clindamycin 300 MG capsule  · cloNIDine 0.3 MG/24HR Ptwk  · hydrALAZINE 100 MG tablet  · isosorbide mononitrate 120 MG extended release tablet  · metoprolol succinate 100 MG extended release tablet  · NIFEdipine 30 MG extended release tablet           Discharge Instructions: Follow up with ROMINA Olivier in 3-5 days. Take medications as directed. Resume activity as tolerated. Diet: ADULT DIET; Dysphagia - Soft and Bite Sized; 3 carb choices (45 gm/meal); Low Potassium (Less than 3000 mg/day)     Disposition: Patient is Stableand will be discharged to Home with [address] [place] [address]. Time spent on discharge 39 minutes spent in assessing patient, reviewing medications, discussion with nursing, confirming safe discharge plan and preparation of discharge summary.     Signed:  ROMINA Edmonds - CNP, 11/25/2021 1:35 PM

## 2021-11-25 NOTE — PLAN OF CARE
Problem: Skin Integrity:  Goal: Will show no infection signs and symptoms  Description: Will show no infection signs and symptoms  11/25/2021 0037 by Althea Crouch RN  Outcome: Ongoing  11/24/2021 1622 by Myah Stapleton RN  Outcome: Ongoing  Goal: Absence of new skin breakdown  Description: Absence of new skin breakdown  11/25/2021 0037 by Althea Crouch RN  Outcome: Ongoing  11/24/2021 1622 by Myah Stapleton RN  Outcome: Ongoing  Goal: Status of oral mucous membranes will improve  Description: Status of oral mucous membranes will improve  11/25/2021 0037 by Althea Crouch RN  Outcome: Ongoing  11/24/2021 1622 by Myah Stapleton RN  Outcome: Ongoing  Goal: Skin integrity will be maintained  Description: Skin integrity will be maintained  11/25/2021 0037 by Althea Crouch RN  Outcome: Ongoing  11/24/2021 1622 by Myah Stapleton RN  Outcome: Ongoing     Problem: Falls - Risk of:  Goal: Will remain free from falls  Description: Will remain free from falls  11/25/2021 0037 by Althea Crouch RN  Outcome: Ongoing  11/24/2021 1622 by Myah Stapleton RN  Outcome: Ongoing  Goal: Absence of physical injury  Description: Absence of physical injury  11/25/2021 0037 by Althea Crouch RN  Outcome: Ongoing  11/24/2021 1622 by Myah Stapleton RN  Outcome: Ongoing     Problem:  Activity:  Goal: Fatigue will decrease  Description: Fatigue will decrease  11/25/2021 0037 by Althea Crouch RN  Outcome: Ongoing  11/24/2021 1622 by Myah Stapleton RN  Outcome: Ongoing  Goal: Risk for activity intolerance will decrease  Description: Risk for activity intolerance will decrease  11/25/2021 0037 by Althea Crouch RN  Outcome: Ongoing  11/24/2021 1622 by Myah Stapleton RN  Outcome: Ongoing     Problem: Coping:  Goal: Ability to cope will improve  Description: Ability to cope will improve  11/25/2021 0037 by Althea Crouch RN  Outcome: Ongoing  11/24/2021 1622 by Katherine Brown RN  Outcome: Ongoing     Problem: Fluid Volume:  Goal: Will show no signs or symptoms of fluid imbalance  Description: Will show no signs or symptoms of fluid imbalance  11/25/2021 0037 by Gertrudis Johnson RN  Outcome: Ongoing  11/24/2021 1622 by Katherine Brown RN  Outcome: Ongoing     Problem: Health Behavior:  Goal: Ability to manage health-related needs will improve  Description: Ability to manage health-related needs will improve  11/25/2021 0037 by Gertrudis Johnson RN  Outcome: Ongoing  11/24/2021 1622 by Katherine Brown RN  Outcome: Ongoing  Goal: Identification of resources available to assist in meeting health care needs will improve  Description: Identification of resources available to assist in meeting health care needs will improve  11/25/2021 0037 by Gertrudis Johnson RN  Outcome: Ongoing  11/24/2021 1622 by Katherine Brown RN  Outcome: Ongoing     Problem: Nutritional:  Goal: Ability to identify appropriate dietary choices will improve  Description: Ability to identify appropriate dietary choices will improve  11/25/2021 0037 by Gertrudis Johnson RN  Outcome: Ongoing  11/24/2021 1622 by Katherine Brown RN  Outcome: Ongoing     Problem: Physical Regulation:  Goal: Ability to maintain clinical measurements within normal limits will improve  Description: Ability to maintain clinical measurements within normal limits will improve  11/25/2021 0037 by Gertrudis Johnson RN  Outcome: Ongoing  11/24/2021 1622 by Katherine Brown RN  Outcome: Ongoing  Goal: Complications related to the disease process, condition or treatment will be avoided or minimized  Description: Complications related to the disease process, condition or treatment will be avoided or minimized  11/25/2021 0037 by Gertrudis Johnson RN  Outcome: Ongoing  11/24/2021 1622 by Katherine Brown RN  Outcome: Ongoing     Problem: Sensory:  Goal: General experience of comfort will improve  Description: General experience of comfort will improve  11/25/2021 0037 by Raymundo Murphy RN  Outcome: Ongoing  11/24/2021 1622 by Melissa Crews RN  Outcome: Ongoing     Problem: Self-Care:  Goal: Ability to participate in self-care as condition permits will improve  Description: Ability to participate in self-care as condition permits will improve  11/25/2021 0037 by Raymundo Murphy RN  Outcome: Ongoing  11/24/2021 1622 by Melissa Crews RN  Outcome: Ongoing

## 2021-11-25 NOTE — PROGRESS NOTES
[facility]      Patient:  Bj Layton  YOB: 1970  Date of Service: 11/25/2021  MRN: [id number]   Acct: [de-identified]   Primary Care Physician: ROMINA Casillas  Advance Directive: Full Code  Admit Date: 11/18/2021       Hospital Day: 7  Portions of this note have been copied forward, however, changed to reflect the most current clinical status of this patient. CHIEF COMPLAINT altered mental status    SUBJECTIVE: States she is feeling great today. She states she has not felt this well since coming into the hospital.    [place] 10:  The patient is a 61year old female with past medical history of ESRD, DM, hypertension and neuropathy, who presented to [address] [place] [address] ED from dialysis center with complaints of altered mental status and left-sided weakness. Last well-known night before admission. Patient was unable to provide HPI information on arrival.  HPI obtained from review of chart. Dialysis staff reported patient having altered mental status. Patient was moving all four extremities on admission but remained confused. Patient was admitted to ICU for hypertensive emergency, started on Cardene drip. Nephrology was consulted to manage ESRD. Patient was weaned off Cardene drip and transferred to floor on 11/19/2021. Blood pressure remained mildly elevated, nephrology adjusted medications. UA taken on 11/21/21 +leukocytes 1+bacteria Rocephin started, did not meet criteria for reflex for culture. Is alert and oriented at this time. She states she feels weak and fatigued. Procardia and Apresoline adjusted. PT OT evaluations in process. Case management to assist in discharge planning, patient inquiring about possible skilled rehab. Patient noted small nodule to left forearm, soft tissue ultrasound of the left upper extremity no indication of blood clot. Left ear canal appeared to be impacted, Debrox drops ordered, canal clear and TM grey and pearly today.  Reports improvement in ear pain. Continued to have some dental pain, has multiple missing and broken teeth, however there is no redness or swelling noted in the gum. Cleocin initiated for possible dental infection. Ear and mouth pain now resolved. Systolic blood pressures still remain over 180. Minoxidil added per nephrology. Continue to monitor blood pressures closely. Review of Systems:   Review of Systems   Constitutional: Negative for chills, diaphoresis, fatigue and fever. HENT: Negative for congestion, ear pain (Left), postnasal drip, sinus pressure, sinus pain, sore throat and trouble swallowing. Eyes: Negative for visual disturbance. Respiratory: Negative for cough, shortness of breath and wheezing. Cardiovascular: Negative for chest pain, palpitations and leg swelling. Gastrointestinal: Negative for abdominal distention, abdominal pain, blood in stool, constipation, diarrhea, nausea and vomiting. Endocrine: Negative for cold intolerance and heat intolerance. Genitourinary: Negative for difficulty urinating, flank pain, frequency and urgency. Musculoskeletal: Negative for arthralgias and myalgias. Neurological: Positive for weakness. Negative for dizziness, syncope, light-headedness and numbness. Reports generalized weakness    Hematological: Does not bruise/bleed easily. Psychiatric/Behavioral: Negative for agitation, confusion and dysphoric mood. 14 point review of systems is negative except as specifically addressed above. Objective:   VITALS:  BP (!) 188/80   Pulse 64   Temp 98.4 °F (36.9 °C)   Resp 16   Ht 5' 6\" (1.676 m)   Wt 146 lb 12.8 oz (66.6 kg)   SpO2 97%   BMI 23.69 kg/m²   24HR INTAKE/OUTPUT:      Intake/Output Summary (Last 24 hours) at 11/25/2021 1222  Last data filed at 11/25/2021 0910  Gross per 24 hour   Intake 768 ml   Output    Net 768 ml       Physical Exam  Constitutional:       General: She is not in acute distress.      Appearance: Normal appearance. She is ill-appearing (Chronically). She is not toxic-appearing or diaphoretic. HENT:      Head: Normocephalic and atraumatic. Right Ear: External ear normal.      Left Ear: External ear normal. There is no impacted cerumen. Nose: Nose normal. No congestion or rhinorrhea. Mouth/Throat:      Mouth: Mucous membranes are moist.      Comments: Poor dentition. Posterior pharynx without exudate. Eyes:      General: No scleral icterus. Extraocular Movements: Extraocular movements intact. Conjunctiva/sclera: Conjunctivae normal.      Pupils: Pupils are equal, round, and reactive to light. Cardiovascular:      Rate and Rhythm: Normal rate and regular rhythm. Pulses: Normal pulses. Heart sounds: Murmur heard. No friction rub. No gallop. Pulmonary:      Effort: Pulmonary effort is normal. No respiratory distress. Breath sounds: Normal breath sounds. No wheezing, rhonchi or rales. Abdominal:      General: Abdomen is flat. Bowel sounds are normal. There is no distension. Palpations: Abdomen is soft. Tenderness: There is no abdominal tenderness. Musculoskeletal:         General: No swelling. Normal range of motion. Cervical back: Normal range of motion and neck supple. Right lower leg: No edema. Left lower leg: No edema. Comments: Nickel sized palpable nodule noted to left forearm appears to have some bruising. Skin:     General: Skin is warm and dry. Coloration: Skin is not jaundiced or pale. Findings: No erythema, lesion or rash. Neurological:      General: No focal deficit present. Mental Status: She is alert and oriented to person, place, and time. Mental status is at baseline. Cranial Nerves: No cranial nerve deficit. Sensory: No sensory deficit. Motor: No weakness. Psychiatric:         Mood and Affect: Mood normal.         Behavior: Behavior normal.         Thought Content:  Thought content normal.         Judgment: Judgment normal.             Medications:      dextrose        clindamycin (CLEOCIN) IV  300 mg IntraVENous Q8H    hydrALAZINE  100 mg Oral 3 times per day    NIFEdipine  90 mg Oral Daily    cloNIDine  1 patch TransDERmal Weekly    insulin lispro  0-12 Units SubCUTAneous TID WC    insulin lispro  0-6 Units SubCUTAneous Nightly    cefTRIAXone (ROCEPHIN) IV  1,000 mg IntraVENous Q24H    heparin (porcine)  5,000 Units SubCUTAneous 3 times per day    DULoxetine  60 mg Oral Daily    hydroCHLOROthiazide  50 mg Oral Daily    insulin glargine  10 Units SubCUTAneous Nightly    isosorbide mononitrate  120 mg Oral Daily    levothyroxine  150 mcg Oral Daily    metoprolol succinate  100 mg Oral QAM AC    rOPINIRole  4 mg Oral Nightly    senna  1 tablet Oral Daily    sevelamer  800 mg Oral TID WC    atorvastatin  20 mg Oral Daily    lisinopril  20 mg Oral BID    insulin regular  10 Units IntraVENous Once     HYDROcodone-acetaminophen, ondansetron, albuterol sulfate HFA, hydrALAZINE, labetalol, acetaminophen **OR** acetaminophen, glucose, dextrose, glucagon (rDNA), dextrose  ADULT DIET; Dysphagia - Soft and Bite Sized; 3 carb choices (45 gm/meal); Low Potassium (Less than 3000 mg/day)     Lab and other Data:     Recent Labs     11/23/21  0234 11/24/21  0205 11/25/21  0250   WBC 4.1* 3.8* 4.0*   HGB 11.4* 11.4* 11.6*    131 146     Recent Labs     11/23/21  0234 11/24/21  0205 11/25/21  0250   * 133* 131*   K 4.3 5.4* 4.6   CL 94* 92* 92*   CO2 26 25 25   BUN 18 30* 20   CREATININE 3.6* 5.2* 3.8*   GLUCOSE 195* 209* 287*     No results for input(s): AST, ALT, ALB, BILITOT, ALKPHOS in the last 72 hours. Troponin T: No results for input(s): TROPONINI in the last 72 hours. Pro-BNP: No results for input(s): BNP in the last 72 hours. INR: No results for input(s): INR in the last 72 hours.   UA:  No results for input(s): NITRITE, COLORU, PHUR, LABCAST, WBCUA, RBCUA, MUCUS, TRICHOMONAS, YEAST, BACTERIA, CLARITYU, SPECGRAV, LEUKOCYTESUR, UROBILINOGEN, BILIRUBINUR, BLOODU, GLUCOSEU, AMORPHOUS in the last 72 hours. Invalid input(s): Marychuy Ness  A1C: No results for input(s): LABA1C in the last 72 hours. ABG:No results for input(s): PHART, PSG6FSW, PO2ART, LYM9KZB, BEART, HGBAE, T0ZWOXWJ, CARBOXHGBART in the last 72 hours. RAD:   CT Head WO Contrast    Result Date: 11/18/2021  No acute intracranial abnormality. The above results were immediately called to ER physician, Dr. JUÁREZ [facility]. Signed by Dr Bao Shin    Result Date: 11/18/2021  Pulmonary vascular congestion/pulmonary edema. Signed by Dr Louie Brock    Result Date: 11/18/2021  Atheromatous changes of the common and proximal internal carotid arteries bilaterally with a moderate, 50-69% stenosis of the proximal left internal carotid artery. The remaining carotid and vertebral arteries are unremarkable as detailed above. The NASCET criteria was utilized for estimation of carotid arterial stenosis. Signed by Dr Louie Brock    Result Date: 11/18/2021  Normal CT angiography of the head. The NASCET criteria was utilized for estimation of carotid arterial stenosis.  Signed by Dr Austin Rhodes: Urine did not meet criteria for culture    Assessment/Plan   Principal Problem:        Hypertensive emergency   -If BP< 160/90, then ok to discharge.    -Cardene drip was discontinued   -Continue lisinopril, metoprolol, Imdur, Procardia, and clonidine   -Minoxidil added per nephrology today   -Monitor closely every 4 vital signs   -As needed medications with parameters   -UF goal increased by nephrology    Active Problems:    Type 2 diabetes mellitus with chronic kidney disease on chronic dialysis, with long-term current use of insulin (HCC)   -Continue current insulin regimen    -hypoglycemia protocol in place   -continue new Accu-Cheks      Acute

## 2021-11-25 NOTE — PROGRESS NOTES
Nephrology ([address] [facility]) progress Note      Patient:  Merissa Velasco  YOB: 1970  Date of Service: 11/25/2021  MRN: [id number]   Acct: [de-identified]   Primary Care Physician: Kandis Friday, APRDAVI  Advance Directive: Full Code  Admit Date: 11/18/2021       Hospital Day: 7  Referring Provider: Nuria Urias MD    Patient independently seen and examined, Chart, Consults, Notes, Operative notes, Labs, Cardiology, and Radiology studies reviewed as available. Subjective:         Patient is a 41-year-old woman with a past medical history of end-stage renal disease, type 2 diabetes, hypertension and neuropathy.  She is admitted with altered mental status and left-sided weakness.  Since admission, patient has been more awake and is now moving all extremities.  She remained confused at times. [facility] required an ICU stay for hypertensive emergency and received Cardene drip.  Renal service is managing her end-stage renal disease.  On November 19, she was transferred to the medical floor. [facility] blood pressure continued to be little elevated.  She was diagnosed with UTI and was placed on Rocephin.  She is s/p dialysis on 11/24.  She remained hypertensive but had no new complaints.       Allergies:  Penicillins, Lamisil advanced [terbinafine], Stadol [butorphanol], and Reglan [metoclopramide]    Medicines:  Current Facility-Administered Medications   Medication Dose Route Frequency Provider Last Rate Last Admin    clindamycin (CLEOCIN) IVPB 300 mg  300 mg IntraVENous Q8H ROMINA Bryan CNP   Stopped at 11/25/21 0845    HYDROcodone-acetaminophen (NORCO) 5-325 MG per tablet 1 tablet  1 tablet Oral Q4H PRN ROMINA Bryan - CNP   1 tablet at 11/24/21 0945    hydrALAZINE (APRESOLINE) tablet 100 mg  100 mg Oral 3 times per day ROMINA Bryan CNP   100 mg at 11/25/21 0817    NIFEdipine (PROCARDIA XL) extended release tablet 90 mg  90 mg Oral Daily Nuria Urias MD 90 mg at 11/25/21 0814    cloNIDine (CATAPRES) 0.3 MG/24HR 1 patch  1 patch TransDERmal Weekly Rachell Jalloh MD   1 patch at 11/22/21 1755    insulin lispro (HUMALOG) injection vial 0-12 Units  0-12 Units SubCUTAneous TID WC Aura Wright, APRN   4 Units at 11/25/21 0820    insulin lispro (HUMALOG) injection vial 0-6 Units  0-6 Units SubCUTAneous Nightly Aura Wright APRN   3 Units at 11/24/21 2114    cefTRIAXone (ROCEPHIN) 1,000 mg in sterile water 10 mL IV syringe  1,000 mg IntraVENous Q24H Aura Wright APRN   1,000 mg at 11/24/21 1217    ondansetron (ZOFRAN) injection 4 mg  4 mg IntraVENous Q6H PRN ROMINA Perez - CNP   4 mg at 11/25/21 0259    heparin (porcine) injection 5,000 Units  5,000 Units SubCUTAneous 3 times per day Charlie Thornton MD   5,000 Units at 11/25/21 0549    albuterol sulfate  (90 Base) MCG/ACT inhaler 2 puff  2 puff Inhalation Q4H PRN Charlie Thornton MD        DULoxetine (CYMBALTA) extended release capsule 60 mg  60 mg Oral Daily Charlie Thornton MD   60 mg at 11/25/21 0815    hydroCHLOROthiazide (HYDRODIURIL) tablet 50 mg  50 mg Oral Daily Charlie Thornton MD   50 mg at 11/25/21 0815    insulin glargine (LANTUS) injection vial 10 Units  10 Units SubCUTAneous Nightly Charlie Thornton MD   10 Units at 11/24/21 2113    hydrALAZINE (APRESOLINE) injection 10 mg  10 mg IntraVENous Q6H PRN Charlie Thornton MD   10 mg at 11/25/21 0305    labetalol (NORMODYNE;TRANDATE) injection 20 mg  20 mg IntraVENous Q4H PRN Charlie Thornton MD   20 mg at 11/22/21 1630    isosorbide mononitrate (IMDUR) extended release tablet 120 mg  120 mg Oral Daily Charlie Thornton MD   120 mg at 11/25/21 7307    levothyroxine (SYNTHROID) tablet 150 mcg  150 mcg Oral Daily Charlie Thornton MD   150 mcg at 11/25/21 0549    metoprolol succinate (TOPROL XL) extended release tablet 100 mg  100 mg Oral [facility] Charlie Thornton MD   100 mg at 11/25/21 0549    rOPINIRole (REQUIP) tablet 4 mg  4 mg Oral Nightly Nagi Ortiz MD   4 mg at 11/24/21 2113    senna (SENOKOT) tablet 8.6 mg  1 tablet Oral Daily Nagi Ortiz MD   8.6 mg at 11/25/21 2878    sevelamer (RENVELA) tablet 800 mg  800 mg Oral TID WC Nagi Ortiz MD   800 mg at 11/25/21 3750    atorvastatin (LIPITOR) tablet 20 mg  20 mg Oral Daily Nagi Ortiz MD   20 mg at 11/25/21 6889    acetaminophen (TYLENOL) tablet 650 mg  650 mg Oral Q6H PRN Nagi Ortiz MD   650 mg at 11/25/21 1041    Or    acetaminophen (TYLENOL) suppository 650 mg  650 mg Rectal Q6H PRN Nagi Ortiz MD        lisinopril (PRINIVIL;ZESTRIL) tablet 20 mg  20 mg Oral BID Nagi Ortiz MD   20 mg at 11/25/21 3202    insulin regular (HUMULIN R;NOVOLIN R) injection 10 Units  10 Units IntraVENous Once Nagi Ortiz MD        glucose (GLUTOSE) 40 % oral gel 15 g  15 g Oral PRN Nagi Ortiz MD        dextrose 50 % IV solution  12.5 g IntraVENous PRN Nagi Ortiz MD   12.5 g at 11/18/21 5104    glucagon (rDNA) injection 1 mg  1 mg IntraMUSCular PRN Nagi Ortiz MD        dextrose 5 % solution  100 mL/hr IntraVENous PRN Nagi Ortiz MD           Past Medical History:  Past Medical History:   Diagnosis Date    Arthritis     [facility] kidney disease, stage 5, [facility] failure [facility] [address]     Closed fracture of right shoulder girdle, with routine healing, subsequent encounter     DM (diabetes mellitus) type II controlled with renal manifestation ([facility] [address] 06/1993    Gastroparesis     GERD (gastroesophageal reflux disease)     Hemodialysis patient ([facility] [address]     dialysis on tues, thur, and sat at [facility]    Hypertension     Hypothyroid     Nasal congestion     recent    Neuropathy     Noncompliance with medications     Palliative care patient 10/08/2019    Restless leg syndrome        Past Surgical History:  Past Surgical History:   Procedure Laterality Date    BREAST SURGERY Left 2008    infected milk gland removed    BREAST SURGERY Right 5/25/2021    WEDGE EXCISION RIGHT BREAST DUCT WITH LACRIMAL DUCT PROBE, PEC BLOCK performed by Kvng Roper MD at [address], LAPAROSCOPIC      2008    COLONOSCOPY Left 9/17/2020    Dr Castillo Inch hepatic flexure-Severe tortuosity with severe spasming, 1 yr recall    DIALYSIS FISTULA CREATION Left 1/25/2019    REVISION LEFT UPPER EXTREMITY AV ACCESS WITH LEFT BRACHIAL ARTERY TO LEFT AXILLARY VEIN WITH  INTERPOSITIONAL ARTEGRAFT   performed by Alissa Freire MD at [address]  2012    [address] Right 1/25/2019    INSERTION OF RIGHT INTERNAL JUGULAR HEMODIALYSIS CATHETER performed by Alissa Freire MD at [address] [facility]  08/17/2018    1.  Moderately increased stainable iron identified by special stain in Kupffer cells and occasional hepatocytes. Negative for evidence of significant portal or lobular inflammation. Negative for evidence of significant fibrosis    OK COLONOSCOPY W/BIOPSY SINGLE/MULTIPLE N/A 10/20/2017    Dr Irish Cowan prep-Tubular AP (-) dysplasia x 2--3 yr recall    OK EGD TRANSORAL BIOPSY SINGLE/MULTIPLE N/A 12/27/2016    Dr Gilma Sifuentes EGD TRANSORAL BIOPSY SINGLE/MULTIPLE N/A 10/20/2017    Dr Getachew José (-)   [address] [facility] VASCULAR SURGERY Left 2016    fistula by Dr Travis Arce at [address]  10/02/2017    [facility]. Left upper fistulograms/venograms, balloon angioplasty cephalic vein arch 14V88 conquest.    VASCULAR SURGERY  08/2018    FISTULOGRAM    VASCULAR SURGERY  10/29/2018    [facility]. Left upper fistulograms/venograms    VASCULAR SURGERY  12/19/2018    [facility]. Arch aortogram,left upper arteriograms.  VASCULAR SURGERY  03/06/2019    [facility]. Removal of tunneled dialyis catheter right internal jugular vein.  VASCULAR SURGERY  08/28/2019    [facility]. Left upper extremity fistulogram including venography to the superior vena cava. Balloon angioplasty left subclavian vein with 10mm x 40mm conquest balloon. Balloon angioplasty mid/proximal upper arm cephalic vein with 48JY x 40mm conquest balloon. Venograms after balloon angioplasty. Stent left mid/proximal upper arm cephalic vein stenosis fluency 10mm x 60mm self-expanding covered stent. Balloon     VASCULAR SURGERY  2019    cont angioplasty stent with 10mm x 40mm conquest balloon. Completion venograms left upper extremity. Family History  Family History   Problem Relation Age of Onset    Colon Cancer Mother          at 63    Colon Polyps Mother     Lung Cancer Father          at 79    Colon Polyps Father     Stomach Cancer Sister     Breast Cancer Sister 27    Depression Daughter 25        committed suicide    Liver Cancer Neg Hx     Liver Disease Neg Hx     Esophageal Cancer Neg Hx     Rectal Cancer Neg Hx        Social History  Social History     Socioeconomic History    Marital status: Single     Spouse name: Not on file    Number of children: 2    Years of education: Not on file    Highest education level: Not on file   Occupational History    Not on file   Tobacco Use    Smoking status: Current Every Day Smoker     Packs/day: 0.50     Years: 33.00     Pack years: 16.50     Types: Cigarettes    Smokeless tobacco: Never Used    Tobacco comment: NOW at 1/4 PD, started at age 24 and has averaged 2 PPD   Vaping Use    Vaping Use: Never used   Substance and Sexual Activity    Alcohol use: No    Drug use: Not Currently     Types: Marijuana Delona Members)    Sexual activity: Not Currently     Partners: Male   Other Topics Concern    Not on file   Social History Narrative    Not on file     Social Determinants of Health     Financial Resource Strain:     Difficulty of Paying Living Expenses: Not on file   Food Insecurity:     Worried About Running Out of Food in the Last Year: Not on file    Kendy of Food in the Last Year: Not on file   Transportation Needs:     Lack of Transportation (Medical):  Not on file    Lack of Transportation (Non-Medical): Not on file   Physical Activity:     Days of Exercise per Week: Not on file    Minutes of Exercise per Session: Not on file   Stress:     Feeling of Stress : Not on file   Social Connections:     Frequency of Communication with Friends and Family: Not on file    Frequency of Social Gatherings with Friends and Family: Not on file    Attends Faith Services: Not on file    Active Member of Clubs or Organizations: Not on file    Attends Club or Organization Meetings: Not on file    Marital Status: Not on file   Intimate Partner Violence:     Fear of Current or Ex-Partner: Not on file    Emotionally Abused: Not on file    Physically Abused: Not on file    Sexually Abused: Not on file   Housing Stability:     Unable to Pay for Housing in the Last Year: Not on file    Number of Places Lived in the Last Year: Not on file    Unstable Housing in the Last Year: Not on file         Review of Systems:  History obtained from chart review and the patient  General ROS: No fever or chills  Respiratory ROS: No cough, shortness of breath, wheezing  Cardiovascular ROS: no chest pain or dyspnea on exertion  Gastrointestinal ROS: No abdominal pain or melena  Genito-Urinary ROS: No dysuria or hematuria  Musculoskeletal ROS: No joint pain or swelling         Objective:  Patient Vitals for the past 24 hrs:   BP Temp Temp src Pulse Resp SpO2 Weight   11/25/21 1025 (!) 196/73         11/25/21 0813       146 lb 12.8 oz (66.6 kg)   11/25/21 0618 (!) 204/90 98.4 °F (36.9 °C)  64 16 97 %    11/25/21 0300 (!) 195/68         11/25/21 0112 (!) 190/80 96.8 °F (36 °C) Temporal 63 16 99 %    11/24/21 2345 (!) 191/79 97.5 °F (36.4 °C) Temporal 66 16 95 %    11/24/21 1719 (!) 176/78 98.4 °F (36.9 °C) Temporal 65 18 96 %    11/24/21 1217 (!) 145/67 98.2 °F (36.8 °C) Temporal 62 18 96 %        Intake/Output Summary (Last 24 hours) at 11/25/2021 1029  Last data filed at 11/25/2021 0910  Gross per 24 hour   Intake Radiologist  Radiology: CT Head WO Contrast    Result Date: 11/18/2021  Examination. CT HEAD WO CONTRAST 11/18/2021 6:50 AM History: Stroke symptoms. Altered mental status. Left-sided weakness. DLP: 619 mGycm. The CT scan of the head is performed without intravenous contrast enhancement. The images are acquired in axial plane and subsequent reconstruction in coronal and sagittal planes. The comparison is made with the previous study dated 10/21/2020. There is no evidence of a mass. No midline shift. There is no intracranial hemorrhage or hematoma. Moderately dilated ventricles, basal cistern and the cortical sulci are similar to the previous study representing a chronic volume loss/atrophy. The scattered areas of chronic white matter ischemia are seen in the centrum semiovale bilaterally. The gray-white matter differentiation is maintained. The images reviewed in bone window show no acute bony abnormality. The visualized paranasal sinuses and mastoid air cells are clear. No acute intracranial abnormality. The above results were immediately called to ER physician, Dr. Alia Atkins. Signed by Dr Gabby Lopez    Result Date: 11/18/2021  Examination. XR CHEST PORTABLE 11/18/2021 7:15 AM History: Hypertension. Left-sided weakness. A frontal portable upright view of the chest is compared with the previous study dated 5/29/2021. There are persistent interstitial changes in the lungs bilaterally which may represent pulmonary vascular congestion or pulmonary edema. There is no pleural effusion. No pneumothorax. There is moderate fullness of the latha bilaterally which may represent pulmonary arterial hypertension. Heart size is not optimally evaluated due to portable projection. Left subclavian venous stent is seen in place. There is moderate diffuse osteopenia. Pulmonary vascular congestion/pulmonary edema. Signed by Dr Rachel Cosme Date: 11/18/2021  Examination. CTA NECK W CONTRAST 11/18/2021 6:59 AM History: Stroke symptoms. DLP: 297 mGycm. The CT angiography of the neck is performed after intravenous contrast enhancement. The images are acquired in axial plane with subsequent 2-D reconstruction in coronal and sagittal planes and 3-D maximum intensity projection reconstruction. There is no previous similar study for comparison. Atheromatous changes of the limited visualized aortic arch is noted. Mild atheromatous changes of the origin of the brachiocephalic trunk is seen. No stenosis. It divides into normal-appearing right common carotid and subclavian arteries. Normal origin of the left common carotid artery seen. A large calcific plaque is seen at the origin of the left subclavian artery with less than 50% focal stenosis at the origin. Incidentally noted is a left subclavian venous stent in place which is patent. Both common carotid arteries are patent and most of the course. There is a calcific plaque along the anterior aspect of the mid left common carotid artery without stenosis. There is a large plaque at the bifurcation of the left common carotid artery which extends posteriorly into the proximal carotid bulb with 50-69% stenosis of the carotid bulb. It extends into the proximal internal carotid artery with less than 50% stenosis of the left internal carotid artery. The remaining left internal carotid artery is normal in caliber with moderate tortuous course. There is a large plaque along the posterior right lateral aspect of the distal right common carotid artery which extends into the carotid bulb with without a significant stenosis of the bulb. Normal size moderately tortuous right internal carotid artery seen. No stenosis. There is a small calcific plaque at the origin of the right vertebral artery from the right subclavian vein. No significant stenosis. There is normal origin of the left vertebral artery.  Normal vertebral arteries bilaterally is seen in the rest of the course in the neck. No focal stenosis or aneurysmal dilatation. Atheromatous changes of the common and proximal internal carotid arteries bilaterally with a moderate, 50-69% stenosis of the proximal left internal carotid artery. The remaining carotid and vertebral arteries are unremarkable as detailed above. The NASCET criteria was utilized for estimation of carotid arterial stenosis. Signed by Dr Roxana Mccarthy    Result Date: 11/18/2021  Examination. CTA HEAD W CONTRAST 11/18/2021 6:53 AM History: Stroke symptoms. DLP: 297 mGycm. The CT angiography of the head is performed after intravenous contrast enhancement. The images are acquired in axial plane and subsequent 2-D reconstruction in coronal and sagittal planes and 3-D maximum intensity projection reconstruction. There is no previous study for comparison. The correlation is made with CT scan of the head obtained earlier today. Normal size internal carotid arteries are seen at the base of the skull, carotid canals and cavernous sinus bilaterally. There are atheromatous plaques in the internal carotid arteries bilaterally in the cavernous sinus without a significant stenosis. No aneurysmal dilatation. Normal size suprasellar internal carotid arteries bilaterally is seen subsequently dividing into normal-appearing anterior and middle cerebral arteries. There is subsequent normal insular and opercular branches. Normal size vertebral arteries enter the foramen magnum and join to make a normal-sized basilar artery which subsequently divides into normal-appearing posterior cerebral arteries bilaterally. Subsequent normal temporal and occipital branches. No areas of focal stenosis or aneurysmal dilatation. Normal CT angiography of the head. The NASCET criteria was utilized for estimation of carotid arterial stenosis.  Signed by Dr Jocelyn Tee   -ESRD  -DM2 with nephropathy on long-term insulin  -HTN  -Hyponatremia  -Hyperkalemia  -Secondary Hyperparathyroidism  -Anemia CKD  -Seizure disorder  -Hypothyroidism  -Acute hypoxemic respiratory failure  -Acute pulmonary edema  -Acute metabolic encephalopathy  -Urine tract infection    Plan:  Resume dialysis per outpatient schedule (check with [facility])  Will give 1 dose of minoxidil   Continue antibiotics  If BP< 160/90, then ok to discharge.        Elizabeth Hyde MD  11/25/21  10:29 AM

## 2021-11-29 LAB
ORGANISM: ABNORMAL
URINE CULTURE, ROUTINE: ABNORMAL
URINE CULTURE, ROUTINE: ABNORMAL

## 2021-12-14 ENCOUNTER — HOSPITAL ENCOUNTER (INPATIENT)
Age: 51
LOS: 8 days | Discharge: NURSING, REHAB OR LONG-TERM CARE | DRG: 637 | End: 2021-12-22
Attending: EMERGENCY MEDICINE | Admitting: INTERNAL MEDICINE
Payer: MEDICARE

## 2021-12-14 ENCOUNTER — APPOINTMENT (OUTPATIENT)
Dept: GENERAL RADIOLOGY | Age: 51
DRG: 637 | End: 2021-12-14
Payer: MEDICARE

## 2021-12-14 DIAGNOSIS — N64.52 DISCHARGE FROM NIPPLE: ICD-10-CM

## 2021-12-14 DIAGNOSIS — Z79.4 TYPE 2 DIABETES MELLITUS WITH HYPERGLYCEMIA, WITH LONG-TERM CURRENT USE OF INSULIN (HCC): ICD-10-CM

## 2021-12-14 DIAGNOSIS — Z99.2 ESRD ON DIALYSIS (HCC): ICD-10-CM

## 2021-12-14 DIAGNOSIS — R09.02 HYPOXIA: ICD-10-CM

## 2021-12-14 DIAGNOSIS — R19.7 NAUSEA VOMITING AND DIARRHEA: Primary | ICD-10-CM

## 2021-12-14 DIAGNOSIS — M54.16 LUMBAR RADICULOPATHY: ICD-10-CM

## 2021-12-14 DIAGNOSIS — E11.00 HYPEROSMOLAR HYPERGLYCEMIC STATE (HHS) (HCC): ICD-10-CM

## 2021-12-14 DIAGNOSIS — N18.6 ESRD ON DIALYSIS (HCC): ICD-10-CM

## 2021-12-14 DIAGNOSIS — R11.2 NAUSEA VOMITING AND DIARRHEA: Primary | ICD-10-CM

## 2021-12-14 DIAGNOSIS — E11.65 TYPE 2 DIABETES MELLITUS WITH HYPERGLYCEMIA, WITH LONG-TERM CURRENT USE OF INSULIN (HCC): ICD-10-CM

## 2021-12-14 LAB
ALBUMIN SERPL-MCNC: 4.2 G/DL (ref 3.5–5.2)
ALP BLD-CCNC: 134 U/L (ref 35–104)
ALT SERPL-CCNC: 10 U/L (ref 5–33)
AMMONIA: 13 UMOL/L (ref 11–51)
ANION GAP SERPL CALCULATED.3IONS-SCNC: 14 MMOL/L (ref 7–19)
ANION GAP SERPL CALCULATED.3IONS-SCNC: 19 MMOL/L (ref 7–19)
AST SERPL-CCNC: 15 U/L (ref 5–32)
BASE EXCESS VENOUS: -5 MMOL/L
BASOPHILS ABSOLUTE: 0.1 K/UL (ref 0–0.2)
BASOPHILS RELATIVE PERCENT: 1.2 % (ref 0–1)
BILIRUB SERPL-MCNC: 0.5 MG/DL (ref 0.2–1.2)
BUN BLDV-MCNC: 30 MG/DL (ref 6–20)
BUN BLDV-MCNC: 74 MG/DL (ref 6–20)
CALCIUM SERPL-MCNC: 8.5 MG/DL (ref 8.6–10)
CALCIUM SERPL-MCNC: 8.6 MG/DL (ref 8.6–10)
CARBOXYHEMOGLOBIN: 3 %
CHLORIDE BLD-SCNC: 74 MMOL/L (ref 98–111)
CHLORIDE BLD-SCNC: 89 MMOL/L (ref 98–111)
CO2: 19 MMOL/L (ref 22–29)
CO2: 26 MMOL/L (ref 22–29)
CREAT SERPL-MCNC: 3.6 MG/DL (ref 0.5–0.9)
CREAT SERPL-MCNC: 7.3 MG/DL (ref 0.5–0.9)
EKG P AXIS: 77 DEGREES
EKG P-R INTERVAL: 158 MS
EKG Q-T INTERVAL: 410 MS
EKG QRS DURATION: 110 MS
EKG QTC CALCULATION (BAZETT): 434 MS
EKG T AXIS: 104 DEGREES
EOSINOPHILS ABSOLUTE: 0.1 K/UL (ref 0–0.6)
EOSINOPHILS RELATIVE PERCENT: 2.5 % (ref 0–5)
GFR AFRICAN AMERICAN: 16
GFR AFRICAN AMERICAN: 7
GFR NON-AFRICAN AMERICAN: 13
GFR NON-AFRICAN AMERICAN: 6
GLUCOSE BLD-MCNC: 1061 MG/DL (ref 74–109)
GLUCOSE BLD-MCNC: 135 MG/DL (ref 70–99)
GLUCOSE BLD-MCNC: 137 MG/DL (ref 74–109)
GLUCOSE BLD-MCNC: 197 MG/DL (ref 70–99)
GLUCOSE BLD-MCNC: 438 MG/DL (ref 70–99)
GLUCOSE BLD-MCNC: 96 MG/DL (ref 70–99)
GLUCOSE BLD-MCNC: 97 MG/DL (ref 70–99)
HAV IGM SER IA-ACNC: NORMAL
HBA1C MFR BLD: 13.1 % (ref 4–6)
HBV SURFACE AB TITR SER: NORMAL {TITER}
HCG QUALITATIVE: NEGATIVE
HCO3 VENOUS: 22 MMOL/L (ref 23–29)
HCT VFR BLD CALC: 36.8 % (ref 37–47)
HEMOGLOBIN: 11.8 G/DL (ref 12–16)
HEPATITIS B CORE IGM ANTIBODY: NORMAL
HEPATITIS B SURFACE ANTIGEN INTERPRETATION: NORMAL
HEPATITIS C ANTIBODY INTERPRETATION: NORMAL
IMMATURE GRANULOCYTES #: 0 K/UL
LACTIC ACID: 1.1 MMOL/L (ref 0.5–1.9)
LIPASE: 79 U/L (ref 13–60)
LYMPHOCYTES ABSOLUTE: 0.7 K/UL (ref 1.1–4.5)
LYMPHOCYTES RELATIVE PERCENT: 12 % (ref 20–40)
MAGNESIUM: 2.1 MG/DL (ref 1.6–2.6)
MCH RBC QN AUTO: 28.9 PG (ref 27–31)
MCHC RBC AUTO-ENTMCNC: 32.1 G/DL (ref 33–37)
MCV RBC AUTO: 90.2 FL (ref 81–99)
METHEMOGLOBIN VENOUS: 1.1 %
MONOCYTES ABSOLUTE: 0.3 K/UL (ref 0–0.9)
MONOCYTES RELATIVE PERCENT: 5.3 % (ref 0–10)
NEUTROPHILS ABSOLUTE: 4.5 K/UL (ref 1.5–7.5)
NEUTROPHILS RELATIVE PERCENT: 78.8 % (ref 50–65)
O2 CONTENT, VEN: 10 ML/DL
O2 SAT, VEN: 60 %
PCO2, VEN: 46 MMHG (ref 40–50)
PDW BLD-RTO: 15 % (ref 11.5–14.5)
PERFORMED ON: ABNORMAL
PERFORMED ON: NORMAL
PERFORMED ON: NORMAL
PH VENOUS: 7.28 (ref 7.35–7.45)
PHOSPHORUS: 2.2 MG/DL (ref 2.5–4.5)
PLATELET # BLD: 121 K/UL (ref 130–400)
PMV BLD AUTO: 10.8 FL (ref 9.4–12.3)
PO2, VEN: 32 MMHG
POTASSIUM SERPL-SCNC: 3 MMOL/L (ref 3.5–5)
POTASSIUM SERPL-SCNC: 5.6 MMOL/L (ref 3.5–5)
RBC # BLD: 4.08 M/UL (ref 4.2–5.4)
SARS-COV-2, NAAT: NOT DETECTED
SODIUM BLD-SCNC: 112 MMOL/L (ref 136–145)
SODIUM BLD-SCNC: 129 MMOL/L (ref 136–145)
TOTAL PROTEIN: 7.1 G/DL (ref 6.6–8.7)
WBC # BLD: 5.7 K/UL (ref 4.8–10.8)

## 2021-12-14 PROCEDURE — 80074 ACUTE HEPATITIS PANEL: CPT

## 2021-12-14 PROCEDURE — 6370000000 HC RX 637 (ALT 250 FOR IP): Performed by: INTERNAL MEDICINE

## 2021-12-14 PROCEDURE — 8010000000 HC HEMODIALYSIS ACUTE INPT

## 2021-12-14 PROCEDURE — 84100 ASSAY OF PHOSPHORUS: CPT

## 2021-12-14 PROCEDURE — 2000000000 HC ICU R&B

## 2021-12-14 PROCEDURE — 87040 BLOOD CULTURE FOR BACTERIA: CPT

## 2021-12-14 PROCEDURE — 5A1D70Z PERFORMANCE OF URINARY FILTRATION, INTERMITTENT, LESS THAN 6 HOURS PER DAY: ICD-10-PCS | Performed by: INTERNAL MEDICINE

## 2021-12-14 PROCEDURE — 80053 COMPREHEN METABOLIC PANEL: CPT

## 2021-12-14 PROCEDURE — 2580000003 HC RX 258: Performed by: INTERNAL MEDICINE

## 2021-12-14 PROCEDURE — 2580000003 HC RX 258: Performed by: EMERGENCY MEDICINE

## 2021-12-14 PROCEDURE — 71045 X-RAY EXAM CHEST 1 VIEW: CPT

## 2021-12-14 PROCEDURE — 99285 EMERGENCY DEPT VISIT HI MDM: CPT

## 2021-12-14 PROCEDURE — 87635 SARS-COV-2 COVID-19 AMP PRB: CPT

## 2021-12-14 PROCEDURE — 85025 COMPLETE CBC W/AUTO DIFF WBC: CPT

## 2021-12-14 PROCEDURE — 83036 HEMOGLOBIN GLYCOSYLATED A1C: CPT

## 2021-12-14 PROCEDURE — 96374 THER/PROPH/DIAG INJ IV PUSH: CPT

## 2021-12-14 PROCEDURE — 86706 HEP B SURFACE ANTIBODY: CPT

## 2021-12-14 PROCEDURE — 83605 ASSAY OF LACTIC ACID: CPT

## 2021-12-14 PROCEDURE — 36600 WITHDRAWAL OF ARTERIAL BLOOD: CPT

## 2021-12-14 PROCEDURE — 2500000003 HC RX 250 WO HCPCS: Performed by: INTERNAL MEDICINE

## 2021-12-14 PROCEDURE — 84703 CHORIONIC GONADOTROPIN ASSAY: CPT

## 2021-12-14 PROCEDURE — 93005 ELECTROCARDIOGRAM TRACING: CPT | Performed by: EMERGENCY MEDICINE

## 2021-12-14 PROCEDURE — 82947 ASSAY GLUCOSE BLOOD QUANT: CPT

## 2021-12-14 PROCEDURE — 83735 ASSAY OF MAGNESIUM: CPT

## 2021-12-14 PROCEDURE — 82140 ASSAY OF AMMONIA: CPT

## 2021-12-14 PROCEDURE — 36415 COLL VENOUS BLD VENIPUNCTURE: CPT

## 2021-12-14 PROCEDURE — 82803 BLOOD GASES ANY COMBINATION: CPT

## 2021-12-14 PROCEDURE — 6360000002 HC RX W HCPCS: Performed by: INTERNAL MEDICINE

## 2021-12-14 PROCEDURE — 83690 ASSAY OF LIPASE: CPT

## 2021-12-14 PROCEDURE — 6370000000 HC RX 637 (ALT 250 FOR IP): Performed by: EMERGENCY MEDICINE

## 2021-12-14 RX ORDER — NICOTINE POLACRILEX 4 MG
15 LOZENGE BUCCAL PRN
Status: DISCONTINUED | OUTPATIENT
Start: 2021-12-14 | End: 2021-12-22 | Stop reason: HOSPADM

## 2021-12-14 RX ORDER — NIFEDIPINE 30 MG/1
90 TABLET, EXTENDED RELEASE ORAL DAILY
Status: DISCONTINUED | OUTPATIENT
Start: 2021-12-14 | End: 2021-12-22 | Stop reason: HOSPADM

## 2021-12-14 RX ORDER — 0.9 % SODIUM CHLORIDE 0.9 %
15 INTRAVENOUS SOLUTION INTRAVENOUS ONCE
Status: DISCONTINUED | OUTPATIENT
Start: 2021-12-14 | End: 2021-12-14

## 2021-12-14 RX ORDER — ATORVASTATIN CALCIUM 40 MG/1
20 TABLET, FILM COATED ORAL DAILY
Status: DISCONTINUED | OUTPATIENT
Start: 2021-12-14 | End: 2021-12-22 | Stop reason: HOSPADM

## 2021-12-14 RX ORDER — HYDROCHLOROTHIAZIDE 25 MG/1
50 TABLET ORAL DAILY
Status: DISCONTINUED | OUTPATIENT
Start: 2021-12-14 | End: 2021-12-22 | Stop reason: HOSPADM

## 2021-12-14 RX ORDER — ROPINIROLE 4 MG/1
4 TABLET, FILM COATED ORAL NIGHTLY
Status: DISCONTINUED | OUTPATIENT
Start: 2021-12-14 | End: 2021-12-22 | Stop reason: HOSPADM

## 2021-12-14 RX ORDER — SENNA PLUS 8.6 MG/1
1 TABLET ORAL DAILY
Status: DISCONTINUED | OUTPATIENT
Start: 2021-12-14 | End: 2021-12-22 | Stop reason: HOSPADM

## 2021-12-14 RX ORDER — ALBUTEROL SULFATE 2.5 MG/3ML
2.5 SOLUTION RESPIRATORY (INHALATION) EVERY 4 HOURS PRN
Status: DISCONTINUED | OUTPATIENT
Start: 2021-12-14 | End: 2021-12-22 | Stop reason: HOSPADM

## 2021-12-14 RX ORDER — CLONIDINE 0.3 MG/24H
1 PATCH, EXTENDED RELEASE TRANSDERMAL WEEKLY
Status: DISCONTINUED | OUTPATIENT
Start: 2021-12-14 | End: 2021-12-21

## 2021-12-14 RX ORDER — POTASSIUM CHLORIDE 7.45 MG/ML
10 INJECTION INTRAVENOUS PRN
Status: DISCONTINUED | OUTPATIENT
Start: 2021-12-14 | End: 2021-12-22 | Stop reason: HOSPADM

## 2021-12-14 RX ORDER — LABETALOL HYDROCHLORIDE 5 MG/ML
20 INJECTION, SOLUTION INTRAVENOUS EVERY 4 HOURS PRN
Status: DISCONTINUED | OUTPATIENT
Start: 2021-12-14 | End: 2021-12-22 | Stop reason: HOSPADM

## 2021-12-14 RX ORDER — HEPARIN SODIUM 5000 [USP'U]/ML
5000 INJECTION, SOLUTION INTRAVENOUS; SUBCUTANEOUS EVERY 8 HOURS SCHEDULED
Status: DISCONTINUED | OUTPATIENT
Start: 2021-12-14 | End: 2021-12-14

## 2021-12-14 RX ORDER — HYDRALAZINE HYDROCHLORIDE 20 MG/ML
10 INJECTION INTRAMUSCULAR; INTRAVENOUS EVERY 6 HOURS PRN
Status: DISCONTINUED | OUTPATIENT
Start: 2021-12-14 | End: 2021-12-22 | Stop reason: HOSPADM

## 2021-12-14 RX ORDER — ISOSORBIDE MONONITRATE 60 MG/1
120 TABLET, EXTENDED RELEASE ORAL DAILY
Status: DISCONTINUED | OUTPATIENT
Start: 2021-12-14 | End: 2021-12-22 | Stop reason: HOSPADM

## 2021-12-14 RX ORDER — INSULIN GLARGINE 100 [IU]/ML
40 INJECTION, SOLUTION SUBCUTANEOUS NIGHTLY
Status: DISCONTINUED | OUTPATIENT
Start: 2021-12-14 | End: 2021-12-14

## 2021-12-14 RX ORDER — CLONIDINE HYDROCHLORIDE 0.1 MG/1
0.3 TABLET ORAL ONCE
Status: COMPLETED | OUTPATIENT
Start: 2021-12-14 | End: 2021-12-14

## 2021-12-14 RX ORDER — DEXTROSE AND SODIUM CHLORIDE 5; .45 G/100ML; G/100ML
INJECTION, SOLUTION INTRAVENOUS CONTINUOUS PRN
Status: DISCONTINUED | OUTPATIENT
Start: 2021-12-14 | End: 2021-12-22 | Stop reason: HOSPADM

## 2021-12-14 RX ORDER — ALBUTEROL SULFATE 90 UG/1
2 AEROSOL, METERED RESPIRATORY (INHALATION) EVERY 4 HOURS PRN
Status: DISCONTINUED | OUTPATIENT
Start: 2021-12-14 | End: 2021-12-14 | Stop reason: CLARIF

## 2021-12-14 RX ORDER — DEXTROSE MONOHYDRATE 25 G/50ML
12.5 INJECTION, SOLUTION INTRAVENOUS PRN
Status: DISCONTINUED | OUTPATIENT
Start: 2021-12-14 | End: 2021-12-14 | Stop reason: SDUPTHER

## 2021-12-14 RX ORDER — MAGNESIUM SULFATE 1 G/100ML
1000 INJECTION INTRAVENOUS PRN
Status: DISCONTINUED | OUTPATIENT
Start: 2021-12-14 | End: 2021-12-22 | Stop reason: HOSPADM

## 2021-12-14 RX ORDER — LEVOTHYROXINE SODIUM 0.07 MG/1
150 TABLET ORAL DAILY
Status: DISCONTINUED | OUTPATIENT
Start: 2021-12-14 | End: 2021-12-22 | Stop reason: HOSPADM

## 2021-12-14 RX ORDER — HYDRALAZINE HYDROCHLORIDE 50 MG/1
100 TABLET, FILM COATED ORAL EVERY 8 HOURS SCHEDULED
Status: DISCONTINUED | OUTPATIENT
Start: 2021-12-14 | End: 2021-12-22 | Stop reason: HOSPADM

## 2021-12-14 RX ORDER — DULOXETIN HYDROCHLORIDE 60 MG/1
60 CAPSULE, DELAYED RELEASE ORAL DAILY
Status: DISCONTINUED | OUTPATIENT
Start: 2021-12-14 | End: 2021-12-22 | Stop reason: HOSPADM

## 2021-12-14 RX ORDER — DEXTROSE MONOHYDRATE 50 MG/ML
100 INJECTION, SOLUTION INTRAVENOUS PRN
Status: DISCONTINUED | OUTPATIENT
Start: 2021-12-14 | End: 2021-12-22 | Stop reason: HOSPADM

## 2021-12-14 RX ORDER — DEXTROSE MONOHYDRATE 25 G/50ML
12.5 INJECTION, SOLUTION INTRAVENOUS PRN
Status: DISCONTINUED | OUTPATIENT
Start: 2021-12-14 | End: 2021-12-22 | Stop reason: HOSPADM

## 2021-12-14 RX ORDER — ERGOCALCIFEROL 1.25 MG/1
50000 CAPSULE ORAL
Status: DISCONTINUED | OUTPATIENT
Start: 2021-12-14 | End: 2021-12-22 | Stop reason: HOSPADM

## 2021-12-14 RX ORDER — SODIUM CHLORIDE 9 MG/ML
INJECTION, SOLUTION INTRAVENOUS CONTINUOUS
Status: DISCONTINUED | OUTPATIENT
Start: 2021-12-14 | End: 2021-12-14

## 2021-12-14 RX ORDER — METOPROLOL SUCCINATE 50 MG/1
100 TABLET, EXTENDED RELEASE ORAL
Status: DISCONTINUED | OUTPATIENT
Start: 2021-12-15 | End: 2021-12-22 | Stop reason: HOSPADM

## 2021-12-14 RX ORDER — INSULIN GLARGINE 100 [IU]/ML
40 INJECTION, SOLUTION SUBCUTANEOUS NIGHTLY
Status: DISCONTINUED | OUTPATIENT
Start: 2021-12-14 | End: 2021-12-19

## 2021-12-14 RX ORDER — SODIUM CHLORIDE, SODIUM LACTATE, POTASSIUM CHLORIDE, CALCIUM CHLORIDE 600; 310; 30; 20 MG/100ML; MG/100ML; MG/100ML; MG/100ML
INJECTION, SOLUTION INTRAVENOUS CONTINUOUS
Status: DISCONTINUED | OUTPATIENT
Start: 2021-12-14 | End: 2021-12-15

## 2021-12-14 RX ORDER — SODIUM CHLORIDE, SODIUM LACTATE, POTASSIUM CHLORIDE, AND CALCIUM CHLORIDE .6; .31; .03; .02 G/100ML; G/100ML; G/100ML; G/100ML
1000 INJECTION, SOLUTION INTRAVENOUS ONCE
Status: COMPLETED | OUTPATIENT
Start: 2021-12-14 | End: 2021-12-14

## 2021-12-14 RX ORDER — SEVELAMER CARBONATE 800 MG/1
800 TABLET, FILM COATED ORAL
Status: DISCONTINUED | OUTPATIENT
Start: 2021-12-14 | End: 2021-12-15

## 2021-12-14 RX ORDER — HYDROCODONE BITARTRATE AND ACETAMINOPHEN 5; 325 MG/1; MG/1
1 TABLET ORAL EVERY 6 HOURS PRN
Status: DISCONTINUED | OUTPATIENT
Start: 2021-12-14 | End: 2021-12-22 | Stop reason: HOSPADM

## 2021-12-14 RX ORDER — PROMETHAZINE HYDROCHLORIDE 12.5 MG/1
6.25 TABLET ORAL ONCE
Status: COMPLETED | OUTPATIENT
Start: 2021-12-14 | End: 2021-12-14

## 2021-12-14 RX ORDER — LISINOPRIL 10 MG/1
20 TABLET ORAL 2 TIMES DAILY
Status: DISCONTINUED | OUTPATIENT
Start: 2021-12-14 | End: 2021-12-22 | Stop reason: HOSPADM

## 2021-12-14 RX ADMIN — LISINOPRIL 20 MG: 10 TABLET ORAL at 21:43

## 2021-12-14 RX ADMIN — ROPINIROLE HYDROCHLORIDE 4 MG: 4 TABLET, FILM COATED ORAL at 21:44

## 2021-12-14 RX ADMIN — POTASSIUM CHLORIDE 10 MEQ: 7.46 INJECTION, SOLUTION INTRAVENOUS at 21:11

## 2021-12-14 RX ADMIN — POTASSIUM CHLORIDE 10 MEQ: 7.46 INJECTION, SOLUTION INTRAVENOUS at 19:29

## 2021-12-14 RX ADMIN — DEXTROSE MONOHYDRATE 2.5 MG/HR: 50 INJECTION, SOLUTION INTRAVENOUS at 22:00

## 2021-12-14 RX ADMIN — POTASSIUM CHLORIDE 10 MEQ: 7.46 INJECTION, SOLUTION INTRAVENOUS at 22:07

## 2021-12-14 RX ADMIN — SODIUM CHLORIDE, POTASSIUM CHLORIDE, SODIUM LACTATE AND CALCIUM CHLORIDE: 600; 310; 30; 20 INJECTION, SOLUTION INTRAVENOUS at 14:26

## 2021-12-14 RX ADMIN — DEXTROSE MONOHYDRATE 10 MG/HR: 50 INJECTION, SOLUTION INTRAVENOUS at 14:07

## 2021-12-14 RX ADMIN — PROMETHAZINE HYDROCHLORIDE 6.25 MG: 12.5 TABLET ORAL at 15:11

## 2021-12-14 RX ADMIN — SODIUM CHLORIDE, POTASSIUM CHLORIDE, SODIUM LACTATE AND CALCIUM CHLORIDE 1000 ML: 600; 310; 30; 20 INJECTION, SOLUTION INTRAVENOUS at 08:00

## 2021-12-14 RX ADMIN — DEXTROSE AND SODIUM CHLORIDE: 5; 450 INJECTION, SOLUTION INTRAVENOUS at 16:16

## 2021-12-14 RX ADMIN — POTASSIUM CHLORIDE 10 MEQ: 7.46 INJECTION, SOLUTION INTRAVENOUS at 18:18

## 2021-12-14 RX ADMIN — HYDRALAZINE HYDROCHLORIDE 100 MG: 50 TABLET, FILM COATED ORAL at 21:44

## 2021-12-14 RX ADMIN — CLONIDINE HYDROCHLORIDE 0.3 MG: 0.1 TABLET ORAL at 08:00

## 2021-12-14 RX ADMIN — SODIUM PHOSPHATE, MONOBASIC, MONOHYDRATE 15 MMOL: 276; 142 INJECTION, SOLUTION INTRAVENOUS at 23:07

## 2021-12-14 RX ADMIN — INSULIN HUMAN 10 UNITS: 100 INJECTION, SOLUTION PARENTERAL at 08:00

## 2021-12-14 RX ADMIN — SODIUM CHLORIDE 0.1 UNITS/KG/HR: 9 INJECTION, SOLUTION INTRAVENOUS at 10:03

## 2021-12-14 ASSESSMENT — PAIN SCALES - GENERAL
PAINLEVEL_OUTOF10: 0
PAINLEVEL_OUTOF10: 0
PAINLEVEL_OUTOF10: 8

## 2021-12-14 ASSESSMENT — PAIN DESCRIPTION - LOCATION: LOCATION: GENERALIZED

## 2021-12-14 NOTE — H&P
[address]    Patient: Andi Juarez   : 1970   MRN: [id number]  Code Status: Full Code  PCP: ROMINA Mcginnis  Date of Service: 2021    Chief Complaint:   Vomiting x3 days    History of Present Illness:   48-year-old female with past medical history as listed below presented to ER with nonbloody vomiting x3 days. Also complains nonbloody diarrhea and fatigue over the same timeframe. Has not been able to keep her medications down. Work-up in ER revealed HHS and hypertensive urgency. History of ESRD on HD. No further history provided.     Review of Systems:   A comprehensive review of systems was negative except for: Gastrointestinal: positive for diarrhea, nausea and vomiting    Past Medical History:     Past Medical History:   Diagnosis Date    Arthritis     Chronic kidney disease, stage 5, kidney failure [facility] [address]     Closed fracture of right shoulder girdle, with routine healing, subsequent encounter     DM (diabetes mellitus) type II controlled with renal manifestation ([facility] [address] 1993    Gastroparesis     GERD (gastroesophageal reflux disease)     Hemodialysis patient ([facility] [address]     dialysis on tues, thur, and sat at [facility]    Hypertension     Hypothyroid     Nasal congestion     recent    Neuropathy     Noncompliance with medications     Palliative care patient 10/08/2019    Restless leg syndrome        Past Surgical History:     Past Surgical History:   Procedure Laterality Date    BREAST SURGERY Left     infected milk gland removed    BREAST SURGERY Right 2021    WEDGE EXCISION RIGHT BREAST DUCT WITH LACRIMAL DUCT PROBE, PEC BLOCK performed by Katty Stanton MD at [address] [place] [address], [facility]          COLONOSCOPY Left 2020    Dr Jigar Caldwell hepatic flexure-Severe tortuosity with severe spasming, 1 yr recall    DIALYSIS FISTULA CREATION Left 2019    REVISION LEFT UPPER EXTREMITY AV ACCESS WITH LEFT BRACHIAL ARTERY TO LEFT AXILLARY VEIN WITH  INTERPOSITIONAL ARTEGRAFT   performed by Mario Mallory MD at [address]  2012    [address] Right 1/25/2019    INSERTION OF RIGHT INTERNAL JUGULAR HEMODIALYSIS CATHETER performed by Mario Mallory MD at [facility] [facility]  08/17/2018    1.  Moderately increased stainable iron identified by special stain in Kupffer cells and occasional hepatocytes. Negative for evidence of significant portal or lobular inflammation. Negative for evidence of significant fibrosis    FL COLONOSCOPY W/BIOPSY SINGLE/MULTIPLE N/A 10/20/2017    Dr Jocelyne Arce prep-Tubular AP (-) dysplasia x 2--3 yr recall    FL EGD TRANSORAL BIOPSY SINGLE/MULTIPLE N/A 12/27/2016    Dr Raffaele Garrison EGD TRANSORAL BIOPSY SINGLE/MULTIPLE N/A 10/20/2017    Dr Antoine Walker (-)   [address] [facility] VASCULAR SURGERY Left 2016    fistula by Dr Ankit Curiel at [address]  10/02/2017    [facility]. Left upper fistulograms/venograms, balloon angioplasty cephalic vein arch [id number] conquest.    VASCULAR SURGERY  08/2018    FISTULOGRAM    VASCULAR SURGERY  10/29/2018    [facility]. Left upper fistulograms/venograms    VASCULAR SURGERY  12/19/2018    [facility]. Arch aortogram,left upper arteriograms.  VASCULAR SURGERY  03/06/2019    [facility]. Removal of tunneled dialyis catheter right internal jugular vein.  VASCULAR SURGERY  08/28/2019    [facility]. Left upper extremity fistulogram including venography to the superior vena cava. Balloon angioplasty left subclavian vein with 10mm x 40mm conquest balloon. Balloon angioplasty mid/proximal upper arm cephalic vein with 52VL x 40mm conquest balloon. Venograms after balloon angioplasty. Stent left mid/proximal upper arm cephalic vein stenosis fluency 10mm x 60mm self-expanding covered stent. Balloon     VASCULAR SURGERY  08/28/2019    cont angioplasty stent with 10mm x 40mm conquest balloon. Completion venograms left upper extremity. Family History:     Family History   Problem Relation Age of Onset    Colon Cancer Mother          at 63    Colon Polyps Mother     Lung Cancer Father          at 79    Colon Polyps Father     Stomach Cancer Sister     Breast Cancer Sister 27    Depression Daughter 25        committed suicide    Liver Cancer Neg Hx     Liver Disease Neg Hx     Esophageal Cancer Neg Hx     Rectal Cancer Neg Hx         Social History:     Social History     Socioeconomic History    Marital status: Single     Spouse name: None    Number of children: 2    Years of education: None    Highest education level: None   Occupational History    None   Tobacco Use    Smoking status: Current Every Day Smoker     Packs/day: 0.50     Years: 33.00     Pack years: 16.50     Types: Cigarettes    Smokeless tobacco: Never Used    Tobacco comment: NOW at 1/4 PD, started at age 24 and has averaged 2 PPD   Vaping Use    Vaping Use: Never used   Substance and Sexual Activity    Alcohol use: No    Drug use: Not Currently     Types: Marijuana Carly Coho)    Sexual activity: Not Currently     Partners: Male   Other Topics Concern    None   Social History Narrative    None     Social Determinants of Health     Financial Resource Strain:     Difficulty of Paying Living Expenses: Not on file   Food Insecurity:     Worried About Running Out of Food in the Last Year: Not on file    Kendy of Food in the Last Year: Not on file   Transportation Needs:     Lack of Transportation (Medical): Not on file    Lack of Transportation (Non-Medical):  Not on file   Physical Activity:     Days of Exercise per Week: Not on file    Minutes of Exercise per Session: Not on file   Stress:     Feeling of Stress : Not on file   Social Connections:     Frequency of Communication with Friends and Family: Not on file    Frequency of Social Gatherings with Friends and Family: Not on file    Attends Zoroastrianism Services: Not on file   Ryanne Peres Member of Clubs or Organizations: Not on file    Attends Club or Organization Meetings: Not on file    Marital Status: Not on file   Intimate Partner Violence:     Fear of Current or Ex-Partner: Not on file    Emotionally Abused: Not on file    Physically Abused: Not on file    Sexually Abused: Not on file   Housing Stability:     Unable to Pay for Housing in the Last Year: Not on file    Number of Nicolette in the Last Year: Not on file    Unstable Housing in the Last Year: Not on file       Prior to Admission Medications:   Medications Prior to Admission: isosorbide mononitrate (IMDUR) 120 MG extended release tablet, Take 1 tablet by mouth daily  cloNIDine (CATAPRES) 0.3 MG/24HR PTWK, Place 1 patch onto the skin once a week  hydrALAZINE (APRESOLINE) 100 MG tablet, Take 1 tablet by mouth every 8 hours  metoprolol succinate (TOPROL XL) 100 MG extended release tablet, Take 1 tablet by mouth every morning (before breakfast)  NIFEdipine (PROCARDIA XL) 30 MG extended release tablet, Take 3 tablets by mouth daily  insulin glargine (BASAGLAR KWIKPEN) 100 UNIT/ML injection pen, Inject 40 Units into the skin nightly Indications: Diabetes  insulin aspart (NOVOLOG FLEXPEN) 100 UNIT/ML injection pen, Inject 10 Units into the skin 3 times daily Indications: Diabetes  Ergocalciferol (VITAMIN D2) 10 MCG (400 UNIT) TABS, Take 1.25 mg by mouth every 14 days  senna (SENNA-LAX) 8.6 MG tablet, Take 1 tablet by mouth daily  sevelamer (RENVELA) 800 MG tablet, Take 1 tablet by mouth 3 times daily (with meals)  lisinopril (PRINIVIL;ZESTRIL) 20 MG tablet, Take 1 tablet by mouth 2 times daily  levothyroxine (SYNTHROID) 150 MCG tablet, Take 150 mcg by mouth Daily  DULoxetine (CYMBALTA) 60 MG extended release capsule, Take 60 mg by mouth daily  rOPINIRole (REQUIP) 2 MG tablet, Take 4 mg by mouth nightly Indications: Restless Leg Syndrome   simvastatin (ZOCOR) 40 MG tablet, Take 40 mg by mouth nightly   HYDROcodone-acetaminophen (NORCO) 5-325 MG per tablet, Take 1 tablet by mouth every 6 hours as needed for Pain. nystatin (MYCOSTATIN) 893158 UNIT/ML suspension, Take 5 mLs by mouth 4 times daily  nystatin (MYCOSTATIN) 309853 UNIT/GM cream, Apply topically 2 times daily. hydroCHLOROthiazide (HYDRODIURIL) 25 MG tablet, Take 2 tablets by mouth daily  albuterol sulfate  (90 Base) MCG/ACT inhaler, Inhale 2 puffs into the lungs every 4 hours as needed     Allergies:      Allergies   Allergen Reactions    Penicillins Hives and Shortness Of Breath    Lamisil Advanced [Terbinafine] Nausea And Vomiting    Stadol [Butorphanol] Nausea And Vomiting     And made me feel crazy    Reglan [Metoclopramide] Other (See Comments)     Body twitching and crawling out of her skin feeling         Physical Exam:   BP (!) 205/80   Pulse 64   Temp 98.9 °F (37.2 °C) (Oral)   Resp 16   Ht 5' 6\" (1.676 m)   Wt 150 lb (68 kg)   SpO2 98%   BMI 24.21 kg/m²     General: no acute distress  HEENT: normocephalic, atraumatic  Neck: supple, symmetrical, trachea midline   Lungs: clear to auscultation bilaterally   Cardiovascular: s1 and s2 normal  Abdomen: soft, positive bowel sounds, nondistended, nontender  Extremities: no edema or cyanosis   Neuro: aaox3, no focal deficits   Skin: normal color and texture     Recent Results (from the past 72 hour(s))   CBC Auto Differential    Collection Time: 12/14/21  6:13 AM   Result Value Ref Range    WBC 5.7 4.8 - 10.8 K/uL    RBC 4.08 (L) 4.20 - 5.40 M/uL    Hemoglobin 11.8 (L) 12.0 - 16.0 g/dL    Hematocrit 36.8 (L) 37.0 - 47.0 %    MCV 90.2 81.0 - 99.0 fL    MCH 28.9 27.0 - 31.0 pg    MCHC 32.1 (L) 33.0 - 37.0 g/dL    RDW 15.0 (H) 11.5 - 14.5 %    Platelets 569 (L) 283 - 400 K/uL    MPV 10.8 9.4 - 12.3 fL    Neutrophils % 78.8 (H) 50.0 - 65.0 %    Lymphocytes % 12.0 (L) 20.0 - 40.0 %    Monocytes % 5.3 0.0 - 10.0 %    Eosinophils % 2.5 0.0 - 5.0 %    Basophils % 1.2 (H) 0.0 - 1.0 %    Neutrophils Absolute 4.5 1.5 - 7.5 K/uL    Immature Granulocytes # 0.0 K/uL    Lymphocytes Absolute 0.7 (L) 1.1 - 4.5 K/uL    Monocytes Absolute 0.30 0.00 - 0.90 K/uL    Eosinophils Absolute 0.10 0.00 - 0.60 K/uL    Basophils Absolute 0.10 0.00 - 0.20 K/uL   Comprehensive Metabolic Panel    Collection Time: 12/14/21  6:13 AM   Result Value Ref Range    Sodium 112 (LL) 136 - 145 mmol/L    Potassium 5.6 (H) 3.5 - 5.0 mmol/L    Chloride 74 (L) 98 - 111 mmol/L    CO2 19 (L) 22 - 29 mmol/L    Anion Gap 19 7 - 19 mmol/L    Glucose 1,061 (HH) 74 - 109 mg/dL    BUN 74 (H) 6 - 20 mg/dL    CREATININE 7.3 (H) 0.5 - 0.9 mg/dL    GFR Non- 6 (A) >60    GFR  7 (L) >59    Calcium 8.6 8.6 - 10.0 mg/dL    Total Protein 7.1 6.6 - 8.7 g/dL    Albumin 4.2 3.5 - 5.2 g/dL    Total Bilirubin 0.5 0.2 - 1.2 mg/dL    Alkaline Phosphatase 134 (H) 35 - 104 U/L    ALT 10 5 - 33 U/L    AST 15 5 - 32 U/L   HCG Qualitative, Serum    Collection Time: 12/14/21  6:13 AM   Result Value Ref Range    hCG Qual Negative    Lipase    Collection Time: 12/14/21  6:13 AM   Result Value Ref Range    Lipase 79 (H) 13 - 60 U/L   Hemoglobin A1c    Collection Time: 12/14/21  6:13 AM   Result Value Ref Range    Hemoglobin A1C 13.1 (H) 4.0 - 6.0 %   COVID-19, Rapid    Collection Time: 12/14/21  8:02 AM    Specimen: Nasopharyngeal Swab   Result Value Ref Range    SARS-CoV-2, NAAT Not Detected Not Detected   AMMONIA    Collection Time: 12/14/21  8:10 AM   Result Value Ref Range    Ammonia 13 11 - 51 umol/L   Lactic Acid, Plasma    Collection Time: 12/14/21  8:10 AM   Result Value Ref Range    Lactic Acid 1.1 0.5 - 1.9 mmol/L   VENOUS BLD GAS    Collection Time: 12/14/21  8:25 AM   Result Value Ref Range    pH, Manish 7.28 (L) 7.35 - 7.45    pCO2, Manish 46.0 40.0 - 50.0 mmHg    pO2, Manish 32 Not Established mmHg    HCO3, Venous 22 (L) 23 - 29 mmol/L    Base Excess, Manish -5 Not Established mmol/L    O2 Sat, Manish 60 Not Established %    Carboxyhemoglobin 3.0 %    MetHgb, Manish 1.1 <1.5 %    O2 Content, Manish 10 Not Established mL/dL       No intake/output data recorded. XR CHEST PORTABLE    Result Date: 12/14/2021  1. Moderate improvement in pulmonary vascular congestion since the previous study. 2. A small right basal pleural effusion. 3. A persistent moderate cardiomegaly. 4. Moderate diffuse osteopenia.  Signed by Dr Silvana Medellin and Plan:   HHS  Insulin gtt  IVF resuscitation  BMP/mag/Phos every 4 hours  Accu-Cheks every hour  Corrected sodium 127  History of DM2  HbA1c 13.1  Counseled    Medical noncompliance  Counseled    Hypertensive urgency  Requiring Cardene gtt  Ensure adequate cerebral perfusion pressure  MRI brain as warranted    ESRD on HD  Renal consulted     Tobacco dependence  Counseled    DVT prophylaxis  SCDs for now    Total critical care time: 70 minutes  Total time spent managing the care of this patient: 70 minutes    Niraj Crabtree MD  12/14/2021 3:10 PM

## 2021-12-14 NOTE — ED PROVIDER NOTES
[facility]ERG[facility]Y DEPT  EMERGENCY DEPARTMENT ENCOUNTER      Pt Name: Barbara Greenwood  MRN: [id number]  Armsizzygfshanon 1970  Date of evaluation: 12/14/2021  Provider: Bernie Romero MD    [address] [place] [address]       Chief Complaint   Patient presents with    Emesis    Diarrhea         HISTORY OF PRESENT ILLNESS   (Location/Symptom, Timing/Onset,Context/Setting, Quality, Duration, Modifying Factors, Severity)  Note limiting factors. Barbara Greenwood is a 45 y.o. female who presents to the emergency department with complaint of nausea, vomiting, diarrhea, generalized body aches, fatigue, chills. Symptoms have been present for last 3 to 4 days. Last dialysis session was on Saturday. States blood sugar at home has just been reading high on the meter. Has generalized abdominal cramping focal abdominal pain. Has had chills but unsure if she has had fever. HPI    NursingNotes were reviewed. REVIEW OF SYSTEMS    (2-9 systems for level 4, 10 or more for level 5)     Review of Systems    A complete review of systems was performed and is negative except as noted above in the HPI.        PAST MEDICAL HISTORY     Past Medical History:   Diagnosis Date    Arthritis     Chronic kidney disease, stage 5, kidney failure ([address]     Closed fracture of right shoulder girdle, with routine healing, subsequent encounter     DM (diabetes mellitus) type II controlled with renal manifestation ([facility] 06/1993    Gastroparesis     GERD (gastroesophageal reflux disease)     Hemodialysis patient ([facility]     dialysis on tues, thur, and sat at [facility]    Hypertension     Hypothyroid     Nasal congestion     recent    Neuropathy     Noncompliance with medications     Palliative care patient 10/08/2019    Restless leg syndrome          SURGICAL HISTORY       Past Surgical History:   Procedure Laterality Date    BREAST SURGERY Left 2008    infected milk gland removed    BREAST SURGERY Right 5/25/2021    WEDGE EXCISION RIGHT BREAST DUCT WITH LACRIMAL DUCT PROBE, PEC BLOCK performed by Stefania Park MD at [address], LAPAROSCOPIC      2008    COLONOSCOPY Left 9/17/2020    Dr Romero [facility] hepatic flexure-Severe tortuosity with severe spasming, 1 yr recall    DIALYSIS FISTULA CREATION Left 1/25/2019    REVISION LEFT UPPER EXTREMITY AV ACCESS WITH LEFT BRACHIAL ARTERY TO LEFT AXILLARY VEIN WITH  INTERPOSITIONAL ARTEGRAFT   performed by Cesar Fong MD at [address]  2012    [address] Right 1/25/2019    INSERTION OF RIGHT INTERNAL JUGULAR HEMODIALYSIS CATHETER performed by Cesar Fong MD at [facility]  08/17/2018    1.  Moderately increased stainable iron identified by special stain in Kupffer cells and occasional hepatocytes. Negative for evidence of significant portal or lobular inflammation. Negative for evidence of significant fibrosis    NM COLONOSCOPY W/BIOPSY SINGLE/MULTIPLE N/A 10/20/2017    Dr Mae Maddox prep-Tubular AP (-) dysplasia x 2--3 yr recall    NM EGD TRANSORAL BIOPSY SINGLE/MULTIPLE N/A 12/27/2016    Dr Amezquita Section EGD TRANSORAL BIOPSY SINGLE/MULTIPLE N/A 10/20/2017    Dr Elba Major (-)   [facility] VASCULAR SURGERY Left 2016    fistula by Dr Mtathew Porras at [address]  10/02/2017    [facility]. Left upper fistulograms/venograms, balloon angioplasty cephalic vein arch [id number] conquest.    VASCULAR SURGERY  08/2018    FISTULOGRAM    VASCULAR SURGERY  10/29/2018    [facility]. Left upper fistulograms/venograms    VASCULAR SURGERY  12/19/2018    [facility]. Arch aortogram,left upper arteriograms.  VASCULAR SURGERY  03/06/2019    [facility]. Removal of tunneled dialyis catheter right internal jugular vein.  VASCULAR SURGERY  08/28/2019    [facility]. Left upper extremity fistulogram including venography to the superior vena cava. Balloon angioplasty left subclavian vein with 10mm x 40mm conquest balloon. Balloon angioplasty mid/proximal upper arm cephalic vein with 10mm x 40mm conquest balloon. Venograms after balloon angioplasty. Stent left mid/proximal upper arm cephalic vein stenosis fluency 10mm x 60mm self-expanding covered stent. Balloon     VASCULAR SURGERY  08/28/2019    cont angioplasty stent with 10mm x 40mm conquest balloon. Completion venograms left upper extremity. CURRENT MEDICATIONS       Previous Medications    ALBUTEROL SULFATE  (90 BASE) MCG/ACT INHALER    Inhale 2 puffs into the lungs every 4 hours as needed    CLONIDINE (CATAPRES) 0.3 MG/24HR PTWK    Place 1 patch onto the skin once a week    DULOXETINE (CYMBALTA) 60 MG EXTENDED RELEASE CAPSULE    Take 60 mg by mouth daily    ERGOCALCIFEROL (VITAMIN D2) 10 MCG (400 UNIT) TABS    Take 1.25 mg by mouth every 14 days    HYDRALAZINE (APRESOLINE) 100 MG TABLET    Take 1 tablet by mouth every 8 hours    HYDROCHLOROTHIAZIDE (HYDRODIURIL) 25 MG TABLET    Take 2 tablets by mouth daily    HYDROCODONE-ACETAMINOPHEN (NORCO) 5-325 MG PER TABLET    Take 1 tablet by mouth every 6 hours as needed for Pain. INSULIN ASPART (NOVOLOG FLEXPEN) 100 UNIT/ML INJECTION PEN    Inject 10 Units into the skin 3 times daily Indications: Diabetes    INSULIN GLARGINE (BASAGLAR KWIKPEN) 100 UNIT/ML INJECTION PEN    Inject 40 Units into the skin nightly Indications: Diabetes    ISOSORBIDE MONONITRATE (IMDUR) 120 MG EXTENDED RELEASE TABLET    Take 1 tablet by mouth daily    LEVOTHYROXINE (SYNTHROID) 150 MCG TABLET    Take 150 mcg by mouth Daily    LISINOPRIL (PRINIVIL;ZESTRIL) 20 MG TABLET    Take 1 tablet by mouth 2 times daily    METOPROLOL SUCCINATE (TOPROL XL) 100 MG EXTENDED RELEASE TABLET    Take 1 tablet by mouth every morning (before breakfast)    NIFEDIPINE (PROCARDIA XL) 30 MG EXTENDED RELEASE TABLET    Take 3 tablets by mouth daily    NYSTATIN (MYCOSTATIN) 322549 UNIT/GM CREAM    Apply topically 2 times daily.     NYSTATIN (MYCOSTATIN) 327238 UNIT/ML SUSPENSION    Take 5 mLs by mouth 4 times daily ROPINIROLE (REQUIP) 2 MG TABLET    Take 4 mg by mouth nightly Indications: Restless Leg Syndrome     SENNA (SENNA-LAX) 8.6 MG TABLET    Take 1 tablet by mouth daily    SEVELAMER (RENVELA) 800 MG TABLET    Take 1 tablet by mouth 3 times daily (with meals)    SIMVASTATIN (ZOCOR) 40 MG TABLET    Take 40 mg by mouth nightly        ALLERGIES     Penicillins, Lamisil advanced [terbinafine], Stadol [butorphanol], and Reglan [metoclopramide]    FAMILY HISTORY       Family History   Problem Relation Age of Onset    Colon Cancer Mother          at 61    Colon Polyps Mother     Lung Cancer Father          at 66    Colon Polyps Father     Stomach Cancer Sister     Breast Cancer Sister 27    Depression Daughter 25        committed suicide    Liver Cancer Neg Hx     Liver Disease Neg Hx     Esophageal Cancer Neg Hx     Rectal Cancer Neg Hx           SOCIAL HISTORY       Social History     Socioeconomic History    Marital status: Single     Spouse name: None    Number of children: 2    Years of education: None    Highest education level: None   Occupational History    None   Tobacco Use    Smoking status: Current Every Day Smoker     Packs/day: 0.50     Years: 33.00     Pack years: 16.50     Types: Cigarettes    Smokeless tobacco: Never Used    Tobacco comment: NOW at 1/4 PD, started at age 24 and has averaged 2 PPD   Vaping Use    Vaping Use: Never used   Substance and Sexual Activity    Alcohol use: No    Drug use: Not Currently     Types: Marijuana [place]im)    Sexual activity: Not Currently     Partners: Male   Other Topics Concern    None   Social History Narrative    None     Social Determinants of Health     Financial Resource Strain:     Difficulty of Paying Living Expenses: Not on file   Food Insecurity:     Worried About Running Out of Food in the Last Year: Not on file    Kendy of Food in the Last Year: Not on file   Transportation Needs:     Lack of Transportation (Medical):  Not on file    Lack of Transportation (Non-Medical): Not on file   Physical Activity:     Days of Exercise per Week: Not on file    Minutes of Exercise per Session: Not on file   Stress:     Feeling of Stress : Not on file   Social Connections:     Frequency of Communication with Friends and Family: Not on file    Frequency of Social Gatherings with Friends and Family: Not on file    Attends Synagogue Services: Not on file    Active Member of [address] [place] [address] or Organizations: Not on file    Attends Club or Organization Meetings: Not on file    Marital Status: Not on file   Intimate Partner Violence:     Fear of Current or Ex-Partner: Not on file    Emotionally Abused: Not on file    Physically Abused: Not on file    Sexually Abused: Not on file   Housing Stability:     Unable to Pay for Housing in the Last Year: Not on file    Number of [place] in the Last Year: Not on file    Unstable Housing in the Last Year: Not on file       SCREENINGS    [place] Coma Scale  Eye Opening: Spontaneous  Best Verbal Response: Oriented  Best Motor Response: Obeys commands  [place] Coma Scale Score: 15        PHYSICAL EXAM    (up to 7 for level 4, 8 or more for level 5)     ED Triage Vitals [12/14/21 0607]   BP Temp Temp Source Pulse Resp SpO2 Height Weight   (!) 242/81 98.9 °F (37.2 °C) Oral 65 20 97 % 5' 6\" (1.676 m) 150 lb (68 kg)       Physical Exam  Vitals and nursing note reviewed. Constitutional:       General: She is not in acute distress. Appearance: She is well-developed. She is not diaphoretic. HENT:      Head: Normocephalic and atraumatic. Eyes:      General: No scleral icterus. Neck:      Vascular: No JVD. Cardiovascular:      Rate and Rhythm: Normal rate and regular rhythm. Pulses:           Radial pulses are 2+ on the right side and 2+ on the left side. Dorsalis pedis pulses are 2+ on the right side and 2+ on the left side. Heart sounds: Normal heart sounds. No murmur heard.   No friction rub. No gallop. Pulmonary:      Effort: Pulmonary effort is normal. No accessory muscle usage or respiratory distress. Breath sounds: Normal breath sounds. No stridor. No decreased breath sounds, wheezing, rhonchi or rales. Chest:      Chest wall: No tenderness. Abdominal:      General: There is no distension. Palpations: Abdomen is soft. Tenderness: There is no abdominal tenderness. There is no guarding or rebound. Musculoskeletal:         General: No deformity. Normal range of motion. Right lower leg: No edema. Left lower leg: No edema. Skin:     General: Skin is warm and dry. Coloration: Skin is not pale. Findings: No erythema. Neurological:      General: No focal deficit present. Mental Status: She is alert and oriented to person, place, and time. GCS: GCS eye subscore is 3. GCS verbal subscore is 5. GCS motor subscore is 6. Cranial Nerves: No cranial nerve deficit. Motor: No abnormal muscle tone. Coordination: Coordination normal.   Psychiatric:         Speech: Speech normal.         Behavior: Behavior is slowed. Thought Content: Thought content normal.         Judgment: Judgment normal.         DIAGNOSTIC RESULTS     EKG: All EKG's are interpreted by the Emergency Department Physician who either signs or Co-signs this chart in the absence of a cardiologist.      RADIOLOGY:   Non-plain film images such as CT, Ultrasound and MRI are read by the radiologist. Plainradiographic images are visualized and preliminarily interpreted by the emergency physician with the below findings:      Interpretation per the Radiologist below, if available at the time of this note:    XR CHEST PORTABLE   Final Result   1. Moderate improvement in pulmonary vascular congestion since the   previous study. 2. A small right basal pleural effusion. 3. A persistent moderate cardiomegaly. 4. Moderate diffuse osteopenia.    Signed by Dr Jesus Meraz Karen            ED BEDSIDE ULTRASOUND:   Performed by ED Physician - none    LABS:  Labs Reviewed   CBC WITH AUTO DIFFERENTIAL - Abnormal; Notable for the following components:       Result Value    RBC 4.08 (*)     Hemoglobin 11.8 (*)     Hematocrit 36.8 (*)     MCHC 32.1 (*)     RDW 15.0 (*)     Platelets 091 (*)     Neutrophils % 78.8 (*)     Lymphocytes % 12.0 (*)     Basophils % 1.2 (*)     Lymphocytes Absolute 0.7 (*)     All other components within normal limits   COMPREHENSIVE METABOLIC PANEL - Abnormal; Notable for the following components:    Sodium 112 (*)     Potassium 5.6 (*)     Chloride 74 (*)     CO2 19 (*)     Glucose 1,061 (*)     BUN 74 (*)     CREATININE 7.3 (*)     GFR Non- 6 (*)     GFR African American 7 (*)     Alkaline Phosphatase 134 (*)     All other components within normal limits    Narrative:     CALL  [place]  [facility] tel. ,  Chemistry results called to and read back by osorio haddad at [facility], 12/14/2021  07:08, by ANTONIO   LIPASE - Abnormal; Notable for the following components:    Lipase 79 (*)     All other components within normal limits   VENOUS BLD GAS - Abnormal; Notable for the following components:    pH, Manish 7.28 (*)     HCO3, Venous 22 (*)     All other components within normal limits   COVID-19, RAPID   CULTURE, BLOOD 1   CULTURE, BLOOD 2   HCG, SERUM, QUALITATIVE   AMMONIA   LACTIC ACID, PLASMA   URINE RT REFLEX TO CULTURE   BLOOD GAS, VENOUS   MISCELLANEOUS SENDOUT 1   HEMOGLOBIN U0M   BASIC METABOLIC PANEL   BASIC METABOLIC PANEL   MAGNESIUM   MAGNESIUM   PHOSPHORUS   PHOSPHORUS   BASIC METABOLIC PANEL   MAGNESIUM   PHOSPHORUS   POCT GLUCOSE   POCT GLUCOSE   POCT GLUCOSE   POCT GLUCOSE   POCT GLUCOSE   POCT GLUCOSE   POCT GLUCOSE   POCT GLUCOSE   POCT GLUCOSE   POCT GLUCOSE   POCT GLUCOSE   POCT GLUCOSE   POCT GLUCOSE       All other labs were within normal range or not returned as of this dictation.     Medications   glucose (GLUTOSE) 40 % oral gel 15 g (has no administration in time range)   dextrose 50 % IV solution (has no administration in time range)   glucagon (rDNA) injection 1 mg (has no administration in time range)   dextrose 5 % solution (has no administration in time range)   dextrose 50 % IV solution (has no administration in time range)   potassium chloride 10 mEq/100 mL IVPB (Peripheral Line) (has no administration in time range)   magnesium sulfate 1000 mg in dextrose 5% 100 mL IVPB (has no administration in time range)   sodium phosphate 10 mmol in dextrose 5 % 250 mL IVPB (has no administration in time range)     Or   sodium phosphate 15 mmol in dextrose 5 % 250 mL IVPB (has no administration in time range)     Or   sodium phosphate 20 mmol in dextrose 5 % 500 mL IVPB (has no administration in time range)   dextrose 5 % and 0.45 % sodium chloride infusion (has no administration in time range)   insulin regular (HUMULIN R;NOVOLIN R) 100 Units in sodium chloride 0.9 % 100 mL infusion (has no administration in time range)   lactated ringers infusion (has no administration in time range)   albuterol sulfate  (90 Base) MCG/ACT inhaler 2 puff (has no administration in time range)   cloNIDine (CATAPRES) 0.3 MG/24HR 1 patch (has no administration in time range)   hydrALAZINE (APRESOLINE) injection 10 mg (has no administration in time range)   labetalol (NORMODYNE;TRANDATE) injection 20 mg (has no administration in time range)   DULoxetine (CYMBALTA) extended release capsule 60 mg (has no administration in time range)   Vitamin D2 TABS 1.25 mg (has no administration in time range)   hydrALAZINE (APRESOLINE) tablet 100 mg (has no administration in time range)   hydroCHLOROthiazide (HYDRODIURIL) tablet 50 mg (has no administration in time range)   HYDROcodone-acetaminophen (NORCO) 5-325 MG per tablet 1 tablet (has no administration in time range)   insulin glargine (LANTUS;BASAGLAR) injection pen 40 Units ( SubCUTAneous Automatically Held 12/17/21 2100) insulin aspart (NOVOLOG) injection pen 10 Units ( SubCUTAneous Automatically Held 12/17/21 2100)   isosorbide mononitrate (IMDUR) extended release tablet 120 mg (has no administration in time range)   levothyroxine (SYNTHROID) tablet 150 mcg (has no administration in time range)   lisinopril (PRINIVIL;ZESTRIL) tablet 20 mg (has no administration in time range)   metoprolol succinate (TOPROL XL) extended release tablet 100 mg (has no administration in time range)   NIFEdipine (PROCARDIA XL) extended release tablet 90 mg (has no administration in time range)   rOPINIRole (REQUIP) tablet 4 mg (has no administration in time range)   senna (SENOKOT) tablet 8.6 mg (has no administration in time range)   sevelamer (RENVELA) tablet 800 mg (has no administration in time range)   atorvastatin (LIPITOR) tablet 20 mg (has no administration in time range)   lactated ringers bolus (1,000 mLs IntraVENous New Bag 12/14/21 0800)   insulin regular (HUMULIN R;NOVOLIN R) injection 10 Units (10 Units IntraVENous Given 12/14/21 0800)   cloNIDine (CATAPRES) tablet 0.3 mg (0.3 mg Oral Given 12/14/21 0800)       EMERGENCY DEPARTMENT COURSE and DIFFERENTIALDIAGNOSIS/MDM:   Vitals:    Vitals:    12/14/21 0825 12/14/21 0855 12/14/21 0902 12/14/21 0920   BP:   (!) 243/92 (!) 243/92   Pulse:    78   Resp: 18   20   Temp:       TempSrc:       SpO2: (!) 87% 98% 96% 97%   Weight:       Height:           MDM  Number of Diagnoses or Management Options      ED Course as of 12/14/21 0947   Tue Dec 14, 2021   0722 Glucose(!!): 1,061 [DIANNE]   0901 EKG shows sinus rhythm with a rate of 74. Evidence of LVH with partial left bundle branch block versus left anterior fascicular block. No findings of acute ischemia or infarction. [DIANNE]   6763 Corrected sodium 128. Venous blood gas shows mild metabolic acidosis. Patient given IV fluid bolus and bolus of 10 units of IV insulin and repeat glucose still reading too high to measure. Suspect HHS.  Patient with somewhat slowed mental status but not comatose. [DIANNE]      ED Course User Index  [DIANNE] Shemar Gonzalez MD     Based on the evaluation and work-up here patient is felt to require further monitoring, work-up, or treatment that is available in the emergency department. Case was discussed with Dr Danny Sheppard who agrees for observation or admission for further management. Treatment and stabilization as necessary were provided in the emergency department prior to transfer of care to the [facility]. CONSULTS:  IP CONSULT TO NEPHROLOGY    PROCEDURES:  Unless otherwise notedbelow, none     Procedures    CRITICAL CARE TIME   Total Critical Care time was 35 minutes, excluding separately reportable procedures. There was a high probability of clinically significant/life threatening deterioration in the patient's condition which required my urgent intervention. FINAL IMPRESSION     1. Nausea vomiting and diarrhea    2. Type 2 diabetes mellitus with hyperglycemia, with long-term current use of insulin (HCC)    3. ESRD on dialysis ([facility]    4. Hypoxia    5. Hyperosmolar hyperglycemic state (HHS) ([facility]          DISPOSITION/PLAN   DISPOSITION Admitted 12/14/2021 09:41:27 AM      PATIENT REFERRED TO:  No follow-up provider specified.     DISCHARGE MEDICATIONS:  New Prescriptions    No medications on file          (Please note that portions of this note were completed with a voice recognition program.  Efforts were made to edit the dictations butoccasionally words are mis-transcribed.)    Shemar Patel MD (electronically signed)  AttendingEmergency Physician         Shemar Gonzalez MD  12/14/21 7499

## 2021-12-14 NOTE — CONSULTS
Renal Consult Note    Thank you to requesting provider: Dr Fabiano Joyce, for asking us to see Ar Deshaun    Reason for consultation:  ESRD    Chief Complaint: Fatigue, vomiting and diarrhea    History of Presenting Illness      Patient is a 62-year-old woman with a past medical history of hypertension, type 2 diabetes and end-stage renal disease. She has been on chronic maintenance hemodialysis at [facility] on T,T,S. She was in her regular state of health until recently when she started having nausea recurrent vomiting and diarrhea. Patient was unable to keep her medications down. She presented to the emergency room and she was found to have a hypertensive crisis with severe hyperglycemia (>1000). She was subsequently admitted to ICU. Renal service was consulted to manage her ESRD. Patient is currently nauseated she was seen and examined during dialysis. Dialysis: Access-left upper extremity aVF. Blood flow rate 600, dialysate flow rate  800, UF  4.3 Liters.     Past Medical/Surgical History      Active Ambulatory Problems     Diagnosis Date Noted    Gastroesophageal reflux disease without esophagitis     Acquired hypothyroidism     Anemia in chronic kidney disease (CKD) 05/23/2017    Iron deficiency 05/23/2017    Family history of colon cancer 10/20/2017    Unintentional weight loss 10/20/2017    Type 2 diabetes mellitus with chronic kidney disease on chronic dialysis, with long-term current use of insulin ([address] 12/09/2017    Acute encephalopathy     Noncompliance of patient with renal dialysis ([address]     Respiratory failure ([address] 03/05/2018    Acute respiratory failure with hypoxia and hypercapnia ([address] 03/07/2018    Dialysis AV fistula malfunction (HCC) 06/27/2018    Gastroparesis 08/08/2018    Nausea and vomiting 08/08/2018    Chronic constipation 08/08/2018    Uncontrolled type 2 diabetes mellitus with complication, with long-term current use of insulin (HCC) 08/08/2018    Abnormal CT of liver 08/15/2018    Other ascites 08/15/2018    ESRD (end stage renal disease) on dialysis [facility])     High hepatic iron concentration determined by biopsy of liver 12/05/2018    Steal syndrome as complication of dialysis access ([address] 12/19/2018    PVD (peripheral vascular disease) ([address]     ESRD on hemodialysis ([address] 01/25/2019    Hyponatremia 01/26/2019    Hyperglycemia due to type 2 diabetes mellitus ([address] 01/26/2019    Normocytic anemia 01/26/2019    Volume overload 03/27/2019    Left homonymous hemianopsia     Acute intractable headache     Accelerated hypertension 04/20/2019    Medically noncompliant 04/20/2019    Noncompliance with medications     Seizure ([address] 10/08/2019    Hyperglycemic hyperosmolar nonketotic coma ([address]     Type 2 diabetes mellitus with hyperosmolar coma, with long-term current use of insulin ([address]     Palliative care patient 10/08/2019    Hypertensive emergency 06/24/2020    Chronic epigastric pain 08/18/2020    Chronic nausea 08/18/2020    Chronic vomiting 08/18/2020    Abnormal CT of the abdomen 08/18/2020    Poor appetite 08/18/2020    Personal history of colonic polyps 08/18/2020    Hyperosmolar syndrome 09/16/2020    DKA, type 2, not at goal [facility]) 10/21/2020    Hypertensive urgency 10/21/2020    Altered mental state 10/21/2020    Sacroiliitis ([address] 01/13/2021    Lumbar radiculopathy 01/13/2021    Left leg pain 02/24/2021    Left leg swelling 02/24/2021    Unable to ambulate 02/24/2021    Discharge from nipple 04/14/2021    Severe hyperglycemia due to diabetes mellitus ([address] 05/15/2021    Acute hypoxemic respiratory failure ([address] 05/29/2021    Generalized weakness 11/20/2021    UTI (urinary tract infection) 11/21/2021     Resolved Ambulatory Problems     Diagnosis Date Noted    Generalized abdominal pain 11/04/2016    Non-intractable vomiting with nausea 11/04/2016    Chronic constipation 11/04/2016    Dysphagia 11/04/2016    Heartburn 11/04/2016    Gastroparesis 03/25/2020    Hypertensive emergency     Hypoglycemia 05/22/2017    Hyperkalemia 05/23/2017    Hyperosmolarity due to secondary diabetes ([facility] 12/09/2017    Hypertensive urgency, malignant     ESRD on hemodialysis ([facility]     Hyponatremia 03/08/2018    Diabetic ketoacidosis without coma associated with type 2 diabetes mellitus ([facility] 04/20/2019    Delirium 04/20/2019    Hypertensive encephalopathy 04/20/2019     Past Medical History:   Diagnosis Date    Arthritis     Chronic kidney disease, stage 5, kidney failure (HCC)     Closed fracture of right shoulder girdle, with routine healing, subsequent encounter     DM (diabetes mellitus) type II controlled with renal manifestation ([facility] 06/1993    GERD (gastroesophageal reflux disease)     Hemodialysis patient ([facility]     Hypertension     Hypothyroid     Nasal congestion     Neuropathy     Restless leg syndrome          Review of Systems     Constitutional:  No weight loss, no fever/chills  Eyes:  No eye pain, no eye redness  Cardiovascular:  No chest pain, no worsening of edema  Respiratory:  No hemoptysis, no stidor  Gastrointestinal:  No blood in stool, + n/v, + diarrhea  Genitoruinary:  No hematuria, no difficulty with urination  Musculoskeletal:  No joint swelling, no redness  Integumentary:  No Rash, no itching  Neurological:  No focal weakness, No new sensory deficit  Psychiatric:  No depression, no confusion  Endocrine:  No polyuria, no polydipsia       Medications        Current Facility-Administered Medications:     glucose (GLUTOSE) 40 % oral gel 15 g, 15 g, Oral, PRN, Yousif Ramon MD    dextrose 50 % IV solution, 12.5 g, IntraVENous, PRNYousif MD    glucagon (rDNA) injection 1 mg, 1 mg, IntraMUSCular, PRN, Yousif Ramon MD    dextrose 5 % solution, 100 mL/hr, IntraVENous, PRNYousif MD    potassium chloride 10 mEq/100 mL IVPB (Peripheral Line), 10 mEq, IntraVENous, PRN, Gina Us MD    magnesium sulfate 1000 mg in dextrose 5% 100 mL IVPB, 1,000 mg, IntraVENous, PRN, Gina Us MD    sodium phosphate 10 mmol in dextrose 5 % 250 mL IVPB, 10 mmol, IntraVENous, PRN **OR** sodium phosphate 15 mmol in dextrose 5 % 250 mL IVPB, 15 mmol, IntraVENous, PRN **OR** sodium phosphate 20 mmol in dextrose 5 % 500 mL IVPB, 20 mmol, IntraVENous, PRN, Gina Us MD    dextrose 5 % and 0.45 % sodium chloride infusion, , IntraVENous, Continuous PRN, Gina Us MD, Last Rate: 150 mL/hr at 12/14/21 1616, New Bag at 12/14/21 1616    insulin regular (HUMULIN R;NOVOLIN R) 100 Units in sodium chloride 0.9 % 100 mL infusion, 0.1 Units/kg/hr, IntraVENous, Continuous, Gina Us MD, Last Rate: 4.1 mL/hr at 12/14/21 1600, 4.11 Units/hr at 12/14/21 1600    lactated ringers infusion, , IntraVENous, Continuous, Gina Us MD, Stopped at 12/14/21 1616    cloNIDine (CATAPRES) 0.3 MG/24HR 1 patch, 1 patch, TransDERmal, Weekly, Gina Us MD    hydrALAZINE (APRESOLINE) injection 10 mg, 10 mg, IntraVENous, Q6H PRN, Gina Us MD    labetalol (NORMODYNE;TRANDATE) injection 20 mg, 20 mg, IntraVENous, Q4H PRN, Gina Us MD    DULoxetine (CYMBALTA) extended release capsule 60 mg, 60 mg, Oral, Daily, Gina Us MD    vitamin D (ERGOCALCIFEROL) capsule 50,000 Units, 50,000 Units, Oral, Q14 Days, Gina Us MD    hydrALAZINE (APRESOLINE) tablet 100 mg, 100 mg, Oral, 3 times per day, Gina Us MD    hydroCHLOROthiazide (HYDRODIURIL) tablet 50 mg, 50 mg, Oral, Daily, Gina Us MD    HYDROcodone-acetaminophen [facility]) 5-325 MG per tablet 1 tablet, 1 tablet, Oral, Q6H PRN, Gina Us MD    isosorbide mononitrate (IMDUR) extended release tablet 120 mg, 120 mg, Oral, Daily, Gina MD Magen    levothyroxine (SYNTHROID) tablet 150 mcg, 150 mcg, Oral, Daily, Gina Us MD    lisinopril (PRINIVIL;ZESTRIL) tablet 20 mg, 20 mg, Oral, BID, Gina MD Magen  [facility] [START ON 12/15/2021] metoprolol succinate (TOPROL XL) extended release tablet 100 mg, 100 mg, Oral, QAM AC, Sandy Wood MD    NIFEdipine (PROCARDIA XL) extended release tablet 90 mg, 90 mg, Oral, Daily, Sandy Wood MD    rOPINIRole (REQUIP) tablet 4 mg, 4 mg, Oral, Nightly, Sandy Wood MD    [facility]) tablet 8.6 mg, 1 tablet, Oral, Daily, Sandy Wood MD    sevelamer (RENVELA) tablet 800 mg, 800 mg, Oral, TID , Sandy Wood MD    atorvastatin (LIPITOR) tablet 20 mg, 20 mg, Oral, Daily, Sandy Wood MD    niCARdipine (CARDENE) 25 mg in dextrose 5 % 250 mL infusion, 3-15 mg/hr, IntraVENous, Continuous, Sandy Wood MD, Last Rate: 50 mL/hr at 12/14/21 1500, 5 mg/hr at 12/14/21 1500    albuterol (PROVENTIL) nebulizer solution 2.5 mg, 2.5 mg, Nebulization, Q4H PRN, Sandy Wood MD    [Held by provider] insulin glargine (LANTUS) injection vial 40 Units, 40 Units, SubCUTAneous, Nightly, Sandy Wood MD    [Held by provider] insulin lispro (HUMALOG) injection vial 10 Units, 10 Units, SubCUTAneous, TID , Sandy Wood MD  Outpatient Medications Marked as Taking for the 12/14/21 encounter [facility] Encounter)   Medication Sig Dispense Refill    isosorbide mononitrate (IMDUR) 120 MG extended release tablet Take 1 tablet by mouth daily 30 tablet 3    cloNIDine (CATAPRES) 0.3 MG/24HR PTWK Place 1 patch onto the skin once a week 4 patch 3    hydrALAZINE (APRESOLINE) 100 MG tablet Take 1 tablet by mouth every 8 hours 90 tablet 3    metoprolol succinate (TOPROL XL) 100 MG extended release tablet Take 1 tablet by mouth every morning (before breakfast) 30 tablet 3    NIFEdipine (PROCARDIA XL) 30 MG extended release tablet Take 3 tablets by mouth daily 30 tablet 3    insulin glargine (BASAGLAR KWIKPEN) 100 UNIT/ML injection pen Inject 40 Units into the skin nightly Indications: Diabetes      insulin aspart (NOVOLOG FLEXPEN) 100 UNIT/ML injection pen Inject 10 Units into the skin 3 times daily Indications: Diabetes 5 pen 3    Ergocalciferol (VITAMIN D2) 10 MCG (400 UNIT) TABS Take 1.25 mg by mouth every 14 days      senna (SENNA-LAX) 8.6 MG tablet Take 1 tablet by mouth daily      sevelamer (RENVELA) 800 MG tablet Take 1 tablet by mouth 3 times daily (with meals)      lisinopril (PRINIVIL;ZESTRIL) 20 MG tablet Take 1 tablet by mouth 2 times daily 30 tablet 0    levothyroxine (SYNTHROID) 150 MCG tablet Take 150 mcg by mouth Daily      DULoxetine (CYMBALTA) 60 MG extended release capsule Take 60 mg by mouth daily      rOPINIRole (REQUIP) 2 MG tablet Take 4 mg by mouth nightly Indications: Restless Leg Syndrome       simvastatin (ZOCOR) 40 MG tablet Take 40 mg by mouth nightly          Allergies   Penicillins, Lamisil advanced [terbinafine], Stadol [butorphanol], and Reglan [metoclopramide]    Family History       Family History   Problem Relation Age of Onset    Colon Cancer Mother          at 61    Colon Polyps Mother     Lung Cancer Father          at 66    Colon Polyps Father     Stomach Cancer Sister     Breast Cancer Sister 27    Depression Daughter 25        committed suicide    Liver Cancer Neg Hx     Liver Disease Neg Hx     Esophageal Cancer Neg Hx     Rectal Cancer Neg Hx      Family history negative for kidney disease.      Social History      Social History     Socioeconomic History    Marital status: Single     Spouse name: None    Number of children: 2    Years of education: None    Highest education level: None   Occupational History    None   Tobacco Use    Smoking status: Current Every Day Smoker     Packs/day: 0.50     Years: 33.00     Pack years: 16.50     Types: Cigarettes    Smokeless tobacco: Never Used    Tobacco comment: NOW at 1/4 PD, started at age 24 and has averaged 2 PPD   Vaping Use    Vaping Use: Never used   Substance and Sexual Activity    Alcohol use: No    Drug use: Not Currently     Types: Marijuana Dolan Meckel)    Sexual activity: Not Currently     Partners: Male   Other Topics Concern    None   Social History Narrative    None     Social Determinants of Health     Financial Resource Strain:     Difficulty of Paying Living Expenses: Not on file   Food Insecurity:     Worried About Running Out of Food in the Last Year: Not on file    Kendy of Food in the Last Year: Not on file   Transportation Needs:     Lack of Transportation (Medical): Not on file    Lack of Transportation (Non-Medical): Not on file   Physical Activity:     Days of Exercise per Week: Not on file    Minutes of Exercise per Session: Not on file   Stress:     Feeling of Stress : Not on file   Social Connections:     Frequency of Communication with Friends and Family: Not on file    Frequency of Social Gatherings with Friends and Family: Not on file    Attends Protestant Services: Not on file    Active Member of [address] [place] [address] [employer] or Organizations: Not on file    Attends Club or Organization Meetings: Not on file    Marital Status: Not on file   Intimate Partner Violence:     Fear of Current or Ex-Partner: Not on file    Emotionally Abused: Not on file    Physically Abused: Not on file    Sexually Abused: Not on file   Housing Stability:     Unable to Pay for Housing in the Last Year: Not on file    Number of [place] in the Last Year: Not on file    Unstable Housing in the Last Year: Not on file       Physical Exam     Blood pressure (!) 116/56, pulse 78, temperature 98 °F (36.7 °C), temperature source Temporal, resp. rate 17, height 5' 6\" (1.676 m), weight 150 lb (68 kg), SpO2 93 %.     General: In moderate to severe distress, A+Ox3, lethargic  HEENT: Atraumatic, normocephalic  Neck:  Supple, normal range of movement  Chest: Bilateral air entry with scattered rales  CV:  RRR, soft systolic murmur  Abdomen:  NTND, soft, +BS, no hepatosplenomegaly  Extremities:  No peripheral edema  Neurological:  Moving all four extremities  Lymphatics:  No palpable lymph nodes   Skin:  No rash, no jaundice  Psychiatric:  Normal insight and judgement, good recall    Data     Recent Labs     12/14/21  0613   WBC 5.7   HGB 11.8*   HCT 36.8*   MCV 90.2   *     Recent Labs     12/14/21  0613   *   K 5.6*   CL 74*   CO2 19*   GLUCOSE 1,061*   BUN 74*   CREATININE 7.3*   LABGLOM 6*   GFRAA 7*       Assessment:  -End stage renal disease  -Type 2 diabetes with renal disease-severe hyperglycemia  -Hypertensive crisis  -Pseudohyponatremia  -Hyperkalemia  -Metabolic acidosis  -Nausea and vomiting  -Diarrhea      Plan:  · At this stage, I agree with Cardene drip and intravenous insulin drip. Dialysis is being administered on an emergent basis. Follow-up labs. Avoid hypotension. I also agree with close monitoring in ICU setting.     Thank you for asking us to participate in the management of your patient, please do not hesitate to contact me for any concerns regarding my recommendations as outlined above.    -----------------------------  Electronically signed by Lynnette Lara MD on 12/14/21 at 5:05 PM CST

## 2021-12-15 LAB
ADENOVIRUS F 40 41 PCR: NOT DETECTED
ALBUMIN SERPL-MCNC: 3.4 G/DL (ref 3.5–5.2)
ALP BLD-CCNC: 105 U/L (ref 35–104)
ALT SERPL-CCNC: 8 U/L (ref 5–33)
ANION GAP SERPL CALCULATED.3IONS-SCNC: 16 MMOL/L (ref 7–19)
AST SERPL-CCNC: 14 U/L (ref 5–32)
ASTROVIRUS PCR: NOT DETECTED
BASOPHILS ABSOLUTE: 0.1 K/UL (ref 0–0.2)
BASOPHILS RELATIVE PERCENT: 1.5 % (ref 0–1)
BILIRUB SERPL-MCNC: 0.5 MG/DL (ref 0.2–1.2)
BUN BLDV-MCNC: 26 MG/DL (ref 6–20)
CALCIUM SERPL-MCNC: 8.3 MG/DL (ref 8.6–10)
CAMPYLOBACTER PCR: NOT DETECTED
CHLORIDE BLD-SCNC: 89 MMOL/L (ref 98–111)
CLOSTRIDIUM DIFFICILE, PCR: NOT DETECTED
CO2: 23 MMOL/L (ref 22–29)
CREAT SERPL-MCNC: 4 MG/DL (ref 0.5–0.9)
CRYPTOSPORIDIUM PCR: NOT DETECTED
CYCLOSPORA CAYETANENSIS PCR: NOT DETECTED
E COLI ENTEROAGGREGATIVE PCR: NOT DETECTED
E COLI ENTEROPATHOGENIC PCR: NOT DETECTED
E COLI ENTEROTOXIGENIC PCR: NOT DETECTED
E COLI SHIGELLA/ENTEROINVASIVE PCR: NOT DETECTED
ENTAMOEBA HISTOLYTICA PCR: NOT DETECTED
EOSINOPHILS ABSOLUTE: 0.2 K/UL (ref 0–0.6)
EOSINOPHILS RELATIVE PERCENT: 4 % (ref 0–5)
GFR AFRICAN AMERICAN: 14
GFR NON-AFRICAN AMERICAN: 12
GIARDIA LAMBLIA PCR: NOT DETECTED
GLUCOSE BLD-MCNC: 126 MG/DL (ref 70–99)
GLUCOSE BLD-MCNC: 232 MG/DL (ref 70–99)
GLUCOSE BLD-MCNC: 254 MG/DL (ref 70–99)
GLUCOSE BLD-MCNC: 306 MG/DL (ref 74–109)
GLUCOSE BLD-MCNC: 78 MG/DL (ref 70–99)
GLUCOSE BLD-MCNC: 89 MG/DL (ref 70–99)
GLUCOSE BLD-MCNC: 93 MG/DL (ref 70–99)
HCT VFR BLD CALC: 34.6 % (ref 37–47)
HEMOGLOBIN: 11.5 G/DL (ref 12–16)
IMMATURE GRANULOCYTES #: 0 K/UL
LYMPHOCYTES ABSOLUTE: 0.9 K/UL (ref 1.1–4.5)
LYMPHOCYTES RELATIVE PERCENT: 19.7 % (ref 20–40)
MAGNESIUM: 2.1 MG/DL (ref 1.6–2.6)
MCH RBC QN AUTO: 28.5 PG (ref 27–31)
MCHC RBC AUTO-ENTMCNC: 33.2 G/DL (ref 33–37)
MCV RBC AUTO: 85.9 FL (ref 81–99)
MONOCYTES ABSOLUTE: 0.4 K/UL (ref 0–0.9)
MONOCYTES RELATIVE PERCENT: 9.2 % (ref 0–10)
NEUTROPHILS ABSOLUTE: 3.1 K/UL (ref 1.5–7.5)
NEUTROPHILS RELATIVE PERCENT: 65.4 % (ref 50–65)
NOROVIRUS GI GII PCR: NOT DETECTED
PDW BLD-RTO: 14.4 % (ref 11.5–14.5)
PERFORMED ON: ABNORMAL
PERFORMED ON: NORMAL
PLATELET # BLD: 113 K/UL (ref 130–400)
PLESIOMONAS SHIGELLOIDES PCR: NOT DETECTED
PMV BLD AUTO: 10.7 FL (ref 9.4–12.3)
POTASSIUM SERPL-SCNC: 4.5 MMOL/L (ref 3.5–5)
RBC # BLD: 4.03 M/UL (ref 4.2–5.4)
ROTAVIRUS A PCR: NOT DETECTED
SALMONELLA PCR: NOT DETECTED
SAPOVIRUS PCR: NOT DETECTED
SHIGA-LIKE TOXIN-PRODUCING E. COLI (STEC) STX1/STX2: NOT DETECTED
SODIUM BLD-SCNC: 128 MMOL/L (ref 136–145)
TOTAL PROTEIN: 5.9 G/DL (ref 6.6–8.7)
VIBRIO CHOLERAE PCR: NOT DETECTED
VIBRIO PCR: NOT DETECTED
WBC # BLD: 4.8 K/UL (ref 4.8–10.8)
YERSINIA ENTEROCOLITICA PCR: NOT DETECTED

## 2021-12-15 PROCEDURE — 6370000000 HC RX 637 (ALT 250 FOR IP): Performed by: INTERNAL MEDICINE

## 2021-12-15 PROCEDURE — 80053 COMPREHEN METABOLIC PANEL: CPT

## 2021-12-15 PROCEDURE — 82947 ASSAY GLUCOSE BLOOD QUANT: CPT

## 2021-12-15 PROCEDURE — 85025 COMPLETE CBC W/AUTO DIFF WBC: CPT

## 2021-12-15 PROCEDURE — 36415 COLL VENOUS BLD VENIPUNCTURE: CPT

## 2021-12-15 PROCEDURE — 1210000000 HC MED SURG R&B

## 2021-12-15 PROCEDURE — 83735 ASSAY OF MAGNESIUM: CPT

## 2021-12-15 PROCEDURE — 0097U HC GI PTHGN MULT REV TRANS & AMP PRB TECH 22 TRGT: CPT

## 2021-12-15 RX ADMIN — HYDROCODONE BITARTRATE AND ACETAMINOPHEN 1 TABLET: 5; 325 TABLET ORAL at 10:58

## 2021-12-15 RX ADMIN — HYDRALAZINE HYDROCHLORIDE 100 MG: 50 TABLET, FILM COATED ORAL at 06:21

## 2021-12-15 RX ADMIN — LEVOTHYROXINE SODIUM 150 MCG: 75 TABLET ORAL at 06:21

## 2021-12-15 RX ADMIN — HYDROCHLOROTHIAZIDE 50 MG: 25 TABLET ORAL at 08:36

## 2021-12-15 RX ADMIN — DULOXETINE 60 MG: 60 CAPSULE, DELAYED RELEASE ORAL at 08:36

## 2021-12-15 RX ADMIN — ROPINIROLE HYDROCHLORIDE 4 MG: 4 TABLET, FILM COATED ORAL at 22:34

## 2021-12-15 RX ADMIN — LISINOPRIL 20 MG: 10 TABLET ORAL at 22:34

## 2021-12-15 RX ADMIN — INSULIN LISPRO 10 UNITS: 100 INJECTION, SOLUTION INTRAVENOUS; SUBCUTANEOUS at 06:24

## 2021-12-15 RX ADMIN — METOPROLOL SUCCINATE 100 MG: 50 TABLET, EXTENDED RELEASE ORAL at 06:21

## 2021-12-15 RX ADMIN — ATORVASTATIN CALCIUM 20 MG: 40 TABLET, FILM COATED ORAL at 08:36

## 2021-12-15 RX ADMIN — LISINOPRIL 20 MG: 10 TABLET ORAL at 08:36

## 2021-12-15 RX ADMIN — ISOSORBIDE MONONITRATE 120 MG: 60 TABLET, EXTENDED RELEASE ORAL at 08:36

## 2021-12-15 RX ADMIN — NIFEDIPINE 90 MG: 30 TABLET, EXTENDED RELEASE ORAL at 08:37

## 2021-12-15 RX ADMIN — INSULIN GLARGINE 40 UNITS: 100 INJECTION, SOLUTION SUBCUTANEOUS at 01:01

## 2021-12-15 RX ADMIN — HYDRALAZINE HYDROCHLORIDE 100 MG: 50 TABLET, FILM COATED ORAL at 22:34

## 2021-12-15 ASSESSMENT — PAIN SCALES - GENERAL
PAINLEVEL_OUTOF10: 8
PAINLEVEL_OUTOF10: 7
PAINLEVEL_OUTOF10: 10
PAINLEVEL_OUTOF10: 0

## 2021-12-15 ASSESSMENT — ENCOUNTER SYMPTOMS
VOICE CHANGE: 0
CONSTIPATION: 0
DIARRHEA: 0
NAUSEA: 0
SHORTNESS OF BREATH: 0
COUGH: 0
BACK PAIN: 0
RHINORRHEA: 0
COLOR CHANGE: 0
VOMITING: 0

## 2021-12-15 NOTE — ACP (ADVANCE CARE PLANNING)
Advance Care Planning     Advance Care Planning Activator (Inpatient)  Conversation Note      Date of ACP Conversation: 12/15/2021     Conversation Conducted with: Pt: Dian Anthony    ACP Activator: Rene Nagy RN      Health Care Decision Maker:     Current Designated Health Care Decision Maker:     Primary Decision Maker: Misbah Mock - Child - [phone]    Supplemental (Other) Decision Maker: Casey Govea - Other - [phone]    Supplemental (Other) Decision Maker: PérezBhumika - Brother/Sister - [phone]    Care Preferences    Ventilation: \"If you were in your present state of health and suddenly became very ill and were unable to breathe on your own, what would your preference be about the use of a ventilator (breathing machine) if it were available to you? \"      Would the patient desire the use of ventilator (breathing machine)?:  YES          Resuscitation  \"CPR works best to restart the heart when there is a sudden event, like a heart attack, in someone who is otherwise healthy. Unfortunately, CPR does not typically restart the heart for people who have serious health conditions or who are very sick. \"    \"In the event your heart stopped as a result of an underlying serious health condition, would you want attempts to be made to restart your heart (answer \"yes\" for attempt to resuscitate) or would you prefer a natural death (answer \"no\" for do not attempt to resuscitate)? \"  YES          Conversation Outcomes:  [x] ACP discussion completed        Follow-up plan:    [x] Schedule follow-up conversation to continue planning. Will assist with living will document.

## 2021-12-15 NOTE — PROGRESS NOTES
Visited with pt to provide assistance in completing an AD/LW. Shayan Crouch, palliative care nurse asked this  to assist pt. This  provided the document for pt to discuss and review. Pt made her decision and named a healthcare surrogate. Pt also made other advance care planning decisions. This  witnessed the pt initial and sign and notarized the document. Original and copies were given to pt, copy went into pt's soft chart, and a copy was scanned and emailed to Alena Jaramillo to scan into pt's EMR. Pt expressed gratitude for spiritual care.     Electronically signed by Jyoti Weber on 12/15/2021 at 3:17 PM

## 2021-12-15 NOTE — PROGRESS NOTES
Hospitalist Progress Note    Patient:  Rachele Montenegro  YOB: 1970  Date of Service: 12/15/2021  MRN: [id number]   Acct: [de-identified]   Primary Care Physician: ROMINA Rubi  Advance Directive: Full Code  Admit Date: 12/14/2021       Hospital Day: 1  Referring Provider: Joe Maher DO    Patient Seen, Chart, Consults, Notes, Labs, Radiology studies reviewed. Subjective:  Rachele Montenegro is a 45 y.o. female  whom we are following for end-stage renal disease, hyperosmolar syndrome, hypertensive urgency. She feels significantly better than when she came in yesterday. Nephrology is following for maintenance dialysis management. She is on a minimal amount of Cardene for blood pressure support. I will transition everything to oral.  I feel she is stable for transfer out of ICU.     Allergies:  Penicillins, Lamisil advanced [terbinafine], Stadol [butorphanol], and Reglan [metoclopramide]    Medicines:  Current Facility-Administered Medications   Medication Dose Route Frequency Provider Last Rate Last Admin    glucose (GLUTOSE) 40 % oral gel 15 g  15 g Oral PRN Aubrey Duarte MD        dextrose 50 % IV solution  12.5 g IntraVENous PRN Aubrey Duarte MD        glucagon (rDNA) injection 1 mg  1 mg IntraMUSCular PRN Aubrey Duarte MD        dextrose 5 % solution  100 mL/hr IntraVENous PRN Aubrey Duarte MD        potassium chloride 10 mEq/100 mL IVPB (Peripheral Line)  10 mEq IntraVENous PRN Harl MD Eduin 100 mL/hr at 12/14/21 2207 10 mEq at 12/14/21 2207    magnesium sulfate 1000 mg in dextrose 5% 100 mL IVPB  1,000 mg IntraVENous PRN Harl Prior, MD        sodium phosphate 10 mmol in dextrose 5 % 250 mL IVPB  10 mmol IntraVENous PRN Harl MD Eduin        Or    sodium phosphate 15 mmol in dextrose 5 % 250 mL IVPB  15 mmol IntraVENous PRN Harl Prior, MD   Stopped at 12/15/21 0307    Or    sodium phosphate 20 mmol in dextrose 5 % 500 mL IVPB  20 mmol IntraVENous PRN Jesi Palm MD        dextrose 5 % and 0.45 % sodium chloride infusion   IntraVENous Continuous PRN Jesi Palm MD   Stopped at 12/14/21 1836    cloNIDine (CATAPRES) 0.3 MG/24HR 1 patch  1 patch TransDERmal Weekly Jesi Palm MD        hydrALAZINE (APRESOLINE) injection 10 mg  10 mg IntraVENous Q6H PRN Jesi Palm MD        labetalol (NORMODYNE;TRANDATE) injection 20 mg  20 mg IntraVENous Q4H PRN Jesi Palm MD        DULoxetine (CYMBALTA) extended release capsule 60 mg  60 mg Oral Daily Jesi Palm MD   60 mg at 12/15/21 3881    vitamin D (ERGOCALCIFEROL) capsule 50,000 Units  50,000 Units Oral Q14 Days Jesi Palm MD        hydrALAZINE (APRESOLINE) tablet 100 mg  100 mg Oral 3 times per day Jesi Palm MD   100 mg at 12/15/21 6499    hydroCHLOROthiazide (HYDRODIURIL) tablet 50 mg  50 mg Oral Daily Jesi Palm MD   50 mg at 12/15/21 0836    HYDROcodone-acetaminophen ([address] [facility]) 5-325 MG per tablet 1 tablet  1 tablet Oral Q6H PRN Jesi Palm MD   1 tablet at 12/15/21 1058    isosorbide mononitrate (IMDUR) extended release tablet 120 mg  120 mg Oral Daily Jesi Palm MD   120 mg at 12/15/21 0836    levothyroxine (SYNTHROID) tablet 150 mcg  150 mcg Oral Daily Jesi Palm MD   150 mcg at 12/15/21 4194    lisinopril (PRINIVIL;ZESTRIL) tablet 20 mg  20 mg Oral BID Jesi Palm MD   20 mg at 12/15/21 0836    metoprolol succinate (TOPROL XL) extended release tablet 100 mg  100 mg Oral QAM AC Jesi Palm MD   100 mg at 12/15/21 5075    NIFEdipine (PROCARDIA XL) extended release tablet 90 mg  90 mg Oral Daily Jesi Palm MD   90 mg at 12/15/21 0837    rOPINIRole (REQUIP) tablet 4 mg  4 mg Oral Nightly Jesi Palm MD   4 mg at 12/14/21 2144    senna (SENOKOT) tablet 8.6 mg  1 tablet Oral Daily Jesi Palm MD        atorvastatin (LIPITOR) tablet 20 mg  20 mg Oral Daily Jesi Palm MD   20 mg at 12/15/21 0836    albuterol (PROVENTIL) nebulizer solution 2.5 mg  2.5 mg Nebulization Q4H PRN Gunnar Sethi MD        [Held by provider] insulin lispro (HUMALOG) injection vial 10 Units  10 Units SubCUTAneous TID  Gunnar Sethi MD        insulin lispro (HUMALOG) injection vial 10 Units  10 Units SubCUTAneous TID [facility] Gunnar Sethi MD   10 Units at 12/15/21 5679    insulin glargine (LANTUS) injection vial 40 Units  40 Units SubCUTAneous Nightly Gunnar Sethi MD   40 Units at 12/15/21 0101    insulin lispro (HUMALOG) injection vial 0-6 Units  0-6 Units SubCUTAneous TID  Gunnar Sethi MD        insulin lispro (HUMALOG) injection vial 0-3 Units  0-3 Units SubCUTAneous Nightly Gunnar Sethi MD           Past Medical History:  Past Medical History:   Diagnosis Date    Arthritis     Chronic kidney disease, stage 5, [facility] failure ([facility] [address]     Closed fracture of right shoulder girdle, with routine healing, subsequent encounter     DM (diabetes mellitus) type II controlled with renal manifestation ([facility] [address] 06/1993    Gastroparesis     GERD (gastroesophageal reflux disease)     Hemodialysis patient ([facility] [address]     dialysis on tues, thur, and sat [facility]    Hypertension     Hypothyroid     Nasal congestion     recent    Neuropathy     Noncompliance with medications     Palliative care patient 10/08/2019    Restless leg syndrome        Past Surgical History:  Past Surgical History:   Procedure Laterality Date    BREAST SURGERY Left 2008    infected milk gland removed    BREAST SURGERY Right 5/25/2021    WEDGE EXCISION RIGHT BREAST DUCT WITH LACRIMAL DUCT PROBE, PEC BLOCK performed by Carlos Alberto Orourke MD at [address] [place] [address], LAPAROSCOPIC      2008    COLONOSCOPY Left 9/17/2020    Dr Vinod Callahan hepatic flexure-Severe tortuosity with severe spasming, 1 yr recall    DIALYSIS FISTULA CREATION Left 1/25/2019    REVISION LEFT UPPER EXTREMITY AV ACCESS WITH LEFT BRACHIAL ARTERY TO LEFT AXILLARY VEIN WITH  INTERPOSITIONAL ARTEGRAFT   performed by Garett Johnson [place] MD at [address]      [address] Right 2019    INSERTION OF RIGHT INTERNAL JUGULAR HEMODIALYSIS CATHETER performed by Pallavi Briscoe MD at [address] [facility]  2018    1.  Moderately increased stainable iron identified by special stain in Kupffer cells and occasional hepatocytes. Negative for evidence of significant portal or lobular inflammation. Negative for evidence of significant fibrosis    [place] COLONOSCOPY W/BIOPSY SINGLE/MULTIPLE N/A 10/20/2017    Dr Rod Springer prep-Tubular AP (-) dysplasia x 2--3 yr recall    [place] EGD TRANSORAL BIOPSY SINGLE/MULTIPLE N/A 2016    Dr Mounika Esparza EGD TRANSORAL BIOPSY SINGLE/MULTIPLE N/A 10/20/2017    Dr Diane Mendez (-)   [facility] VASCULAR SURGERY Left     fistula by Dr Loly White at [address]  10/02/2017    [facility]. Left upper fistulograms/venograms, balloon angioplasty cephalic vein arch [id number] conquest.    VASCULAR SURGERY  2018    FISTULOGRAM    VASCULAR SURGERY  10/29/2018    [facility]. Left upper fistulograms/venograms    VASCULAR SURGERY  2018    [facility]. Arch aortogram,left upper arteriograms.  VASCULAR SURGERY  2019    [facility]. Removal of tunneled dialyis catheter right internal jugular vein.  VASCULAR SURGERY  2019    [facility]. Left upper extremity fistulogram including venography to the superior vena cava. Balloon angioplasty left subclavian vein with 10mm x 40mm conquest balloon. Balloon angioplasty mid/proximal upper arm cephalic vein with 53WX x 40mm conquest balloon. Venograms after balloon angioplasty. Stent left mid/proximal upper arm cephalic vein stenosis fluency 10mm x 60mm self-expanding covered stent. Balloon     VASCULAR SURGERY  2019    cont angioplasty stent with 10mm x 40mm conquest balloon. Completion venograms left upper extremity.        Family History  Family History   Problem Relation Age of Onset    Colon Cancer Mother          at Meetings: Not on file    Marital Status: Not on file   Intimate Partner Violence:     Fear of Current or Ex-Partner: Not on file    Emotionally Abused: Not on file    Physically Abused: Not on file    Sexually Abused: Not on file   Housing Stability:     Unable to Pay for Housing in the Last Year: Not on file    Number of [place] in the Last Year: Not on file    Unstable Housing in the Last Year: Not on file         Review of Systems:    Review of Systems   Constitutional: Negative for activity change and fatigue. HENT: Negative for rhinorrhea and voice change. Eyes: Negative for visual disturbance. Respiratory: Negative for cough and shortness of breath. Cardiovascular: Negative for chest pain and leg swelling. Gastrointestinal: Negative for constipation, diarrhea, nausea and vomiting. Endocrine: Negative for polyuria. Genitourinary: Negative for difficulty urinating and dysuria. Musculoskeletal: Negative for arthralgias and back pain. Skin: Negative for color change. Allergic/Immunologic: Negative for immunocompromised state. Neurological: Positive for weakness. Negative for dizziness and headaches. Psychiatric/Behavioral: Negative for confusion. Objective:  Blood pressure (!) 153/61, pulse 67, temperature 98.5 °F (36.9 °C), temperature source Temporal, resp. rate 13, height 5' 6\" (1.676 m), weight 150 lb (68 kg), SpO2 97 %. Intake/Output Summary (Last 24 hours) at 12/15/2021 1215  Last data filed at 12/15/2021 0900  Gross per 24 hour   Intake 2771.26 ml   Output    Net 2771.26 ml       Physical Exam  Vitals and nursing note reviewed. Constitutional:       Appearance: She is ill-appearing. HENT:      Head: Normocephalic and atraumatic.       Right Ear: External ear normal.      Left Ear: External ear normal.      Nose: Nose normal.      Mouth/Throat:      Mouth: Mucous membranes are moist.   Eyes:      Conjunctiva/sclera: Conjunctivae normal.      Pupils: Pupils are equal, round, and reactive to light. Cardiovascular:      Rate and Rhythm: Normal rate and regular rhythm. Heart sounds: Normal heart sounds. Pulmonary:      Effort: Pulmonary effort is normal.      Breath sounds: Normal breath sounds. Abdominal:      General: Abdomen is flat. Palpations: Abdomen is soft. Musculoskeletal:         General: Normal range of motion. Cervical back: Neck supple. No rigidity. No muscular tenderness. Skin:     General: Skin is warm and dry. Neurological:      Mental Status: She is alert and oriented to person, place, and time. Psychiatric:         Mood and Affect: Mood normal.         Labs:  BMP:   Recent Labs     12/14/21  0613 12/14/21  1715 12/15/21  0134   * 129* 128*   K 5.6* 3.0* 4.5   CL 74* 89* 89*   CO2 19* 26 23   PHOS  --  2.2*  --    BUN 74* 30* 26*   CREATININE 7.3* 3.6* 4.0*   CALCIUM 8.6 8.5* 8.3*     CBC:   Recent Labs     12/14/21  0613 12/15/21  0134   WBC 5.7 4.8   HGB 11.8* 11.5*   HCT 36.8* 34.6*   MCV 90.2 85.9   * 113*     LIVER PROFILE:   Recent Labs     12/14/21  0613 12/15/21  0134   AST 15 14   ALT 10 8   LIPASE 79*  --    BILITOT 0.5 0.5   ALKPHOS 134* 105*     PT/INR: No results for input(s): PROTIME, INR in the last 72 hours. APTT: No results for input(s): APTT in the last 72 hours. BNP:  No results for input(s): BNP in the last 72 hours. Ionized Calcium:No results for input(s): IONCA in the last 72 hours. Magnesium:  Recent Labs     12/14/21  1715 12/15/21  0134   MG 2.1 2.1     Phosphorus:  Recent Labs     12/14/21 1715   PHOS 2.2*     HgbA1C:   Recent Labs     12/14/21 0613   LABA1C 13.1*     Hepatic:   Recent Labs     12/14/21  0613 12/15/21  0134   ALKPHOS 134* 105*   ALT 10 8   AST 15 14   PROT 7.1 5.9*   BILITOT 0.5 0.5   LABALBU 4.2 3.4*     Lactic Acid:   Recent Labs     12/14/21  0810   LACTA 1.1     Troponin: No results for input(s): CKTOTAL, CKMB, TROPONINT in the last 72 hours.   ABGs: No results for input(s): PH, PCO2, PO2, HCO3, O2SAT in the last 72 hours. CRP:  No results for input(s): CRP in the last 72 hours. Sed Rate:  No results for input(s): SEDRATE in the last 72 hours. Cultures:   No results for input(s): CULTURE in the last 72 hours. Recent Labs     12/14/21  0810 12/14/21  0905   BC No Growth to date. Any change in status will be called. --    BLOODCULT2  --  No Growth to date. Any change in status will be called. No results for input(s): CXSURG in the last 72 hours. Radiology reports as per the Radiologist  Radiology: XR CHEST PORTABLE    Result Date: 12/14/2021  Examination. XR CHEST PORTABLE 12/14/2021 7:59 AM History: Fever. A frontal portable upright view of the chest is obtained and compared with the previous study dated 11/18/2021. There is moderate improvement in pulmonary vascular congestion. There is a small right basal pleural effusion. There is no pneumothorax. There is persistent moderate cardiomegaly. There is a left subclavian vascular stent in place similar to the previous study. There is moderate diffuse osteopenia. No acute bony abnormality. 1. Moderate improvement in pulmonary vascular congestion since the previous study. 2. A small right basal pleural effusion. 3. A persistent moderate cardiomegaly. 4. Moderate diffuse osteopenia. Signed by Dr Ervin Sheth     Hyperosmolar hyperglycemic state (HHS). Clinically improved. Continue subcu insulin. Hypertensive emergency. Resolved. Continue oral antihypertensives. End-stage renal disease. Continue maintenance dialysis. Transfer out of ICU. Please document 43 minutes of critical care time for patient assessment, chart review, discussion with staff, .       Ese George, DO

## 2021-12-15 NOTE — PLAN OF CARE
Problem: Skin Integrity:  Goal: Will show no infection signs and symptoms  Description: Will show no infection signs and symptoms  12/15/2021 0928 by Jade Cummings RN  Outcome: Ongoing  12/15/2021 0520 by Stevne Mata RN  Outcome: Ongoing  Goal: Absence of new skin breakdown  Description: Absence of new skin breakdown  12/15/2021 0928 by Jade Cummings RN  Outcome: Ongoing  12/15/2021 0520 by Steven Mata RN  Outcome: Ongoing     Problem: Falls - Risk of:  Goal: Will remain free from falls  Description: Will remain free from falls  12/15/2021 0928 by Jade Cummings RN  Outcome: Ongoing  12/15/2021 0520 by Steven Mata RN  Outcome: Ongoing  Goal: Absence of physical injury  Description: Absence of physical injury  12/15/2021 0928 by Jade Cummings RN  Outcome: Ongoing  12/15/2021 0520 by Steven Mata RN  Outcome: Ongoing

## 2021-12-15 NOTE — CONSULTS
Palliative Care:    Known to Palliative Care.    44 y/o lady who presented to ED with n/v/d. She is a chronic hemodialysis pt at [facility]. She is admitted to ICU with hypertensive crisis and hyperglycemia. Report from nursing, pt might move to the floor later today. Pt is alert and able to review history since last admission. She lives alone and uses the transport bus to get to dialysis TTS. She has a cane, walker, shower chair and w/c at home. GOALS DISCUSSION: Pt states she is willing to go to short term rehab as is has been suggested. SW will f/u with placement requests. Review of contacts and decision maker. Pt states she no longer lives with her s/o Shirley Connor And that her son Syl Love is not available for a few years. Plans for  to assist pt with a living will to address her wishes and name a HCS. Past Medical History:        Past Medical History:   Diagnosis Date    Arthritis     Chronic kidney disease, stage 5, kidney failure (HCC)     Closed fracture of right shoulder girdle, with routine healing, subsequent encounter     DM (diabetes mellitus) type II controlled with renal manifestation ([facility] [address] 06/1993    Gastroparesis     GERD (gastroesophageal reflux disease)     Hemodialysis patient ([facility] [address]     dialysis on tues, thur, and sat at [facility]    Hypertension     Hypothyroid     Nasal congestion     recent    Neuropathy     Noncompliance with medications     Palliative care patient 10/08/2019    Restless leg syndrome        Advance Directives:    Full code     Pain/Other Symptoms:    Pt denies pain or soa at this time    Activity:      As tolerated. States she is independent at home         Plan:    Hopeful to move to the floor. Continue medical management and monitoring. Patient/family discussion r/t goals:  Pt's goal is to return home, but willing to go to short term rehab if needed.         Electronically signed by Hermes Panchal RN on 12/15/2021 at 11:04 AM

## 2021-12-15 NOTE — PROGRESS NOTES
Nephrology ([facility]) Progress Note    Patient:  Darby Boss  YOB: 1970  Date of Service: 12/15/2021  MRN: [id number]   Acct: [de-identified]   Primary Care Physician: ROMINA Leon  Advance Directive: Full Code  Admit Date: 12/14/2021       Hospital Day: 1  Referring Provider: Carla Kurtz DO    Patient Seen, Chart, Consults notes, Labs, Radiology studies reviewed. Subjective:  Patient is a 23-year-old woman with a past medical history of hypertension, type 2 diabetes and end-stage renal disease. She has been on chronic maintenance hemodialysis at [facility] on T,T,S. She was in her regular state of health until recently when she started having nausea recurrent vomiting and diarrhea. Patient was unable to keep her medications down. She presented to the emergency room and she was found to have a hypertensive crisis with severe hyperglycemia (>1000). She was subsequently admitted to ICU. Renal service was consulted to manage her ESRD. Patient is s/p dialysis on 12/14. Today, she was awake and was complaining of leg cramps. She remained on some Cardene drip.      Allergies:  Penicillins, Lamisil advanced [terbinafine], Stadol [butorphanol], and Reglan [metoclopramide]    Medicines:  Current Facility-Administered Medications   Medication Dose Route Frequency Provider Last Rate Last Admin    glucose (GLUTOSE) 40 % oral gel 15 g  15 g Oral PRN Tej Ramires MD        dextrose 50 % IV solution  12.5 g IntraVENous PRN Tej Ramires MD        glucagon (rDNA) injection 1 mg  1 mg IntraMUSCular PRN Tej Ramires MD        dextrose 5 % solution  100 mL/hr IntraVENous PRN Tej Ramires MD        potassium chloride 10 mEq/100 mL IVPB (Peripheral Line)  10 mEq IntraVENous PRN Dawson Ahn  mL/hr at 12/14/21 2207 10 mEq at 12/14/21 2207    magnesium sulfate 1000 mg in dextrose 5% 100 mL IVPB  1,000 mg IntraVENous PRN Luis Alberto Pack Scottie Jeong MD        sodium phosphate 10 mmol in dextrose 5 % 250 mL IVPB  10 mmol IntraVENous PRN Isai Trejo MD        Or    sodium phosphate 15 mmol in dextrose 5 % 250 mL IVPB  15 mmol IntraVENous PRN Isai Trejo MD   Stopped at 12/15/21 0307    Or    sodium phosphate 20 mmol in dextrose 5 % 500 mL IVPB  20 mmol IntraVENous PRN Isai Trejo MD        dextrose 5 % and 0.45 % sodium chloride infusion   IntraVENous Continuous PRN Isai Trejo MD   Stopped at 12/14/21 1836    lactated ringers infusion   IntraVENous Continuous Isai Trejo MD 50 mL/hr at 12/14/21 1836 Rate Change at 12/14/21 1836    cloNIDine (CATAPRES) 0.3 MG/24HR 1 patch  1 patch TransDERmal Weekly Isai Trejo MD        hydrALAZINE (APRESOLINE) injection 10 mg  10 mg IntraVENous Q6H PRN Isai Trejo MD        labetalol (NORMODYNE;TRANDATE) injection 20 mg  20 mg IntraVENous Q4H PRN Isai Trejo MD        DULoxetine (CYMBALTA) extended release capsule 60 mg  60 mg Oral Daily Isai Trejo MD        vitamin D (ERGOCALCIFEROL) capsule 50,000 Units  50,000 Units Oral Q14 Days Isai Trejo MD        hydrALAZINE (APRESOLINE) tablet 100 mg  100 mg Oral 3 times per day Isai Trejo MD   100 mg at 12/15/21 5060    hydroCHLOROthiazide (HYDRODIURIL) tablet 50 mg  50 mg Oral Daily Isai Trejo MD        HYDROcodone-acetaminophen [facility]) 5-325 MG per tablet 1 tablet  1 tablet Oral Q6H PRN Isai Trejo MD        isosorbide mononitrate (IMDUR) extended release tablet 120 mg  120 mg Oral Daily Isai Trejo MD        levothyroxine (SYNTHROID) tablet 150 mcg  150 mcg Oral Daily Isai Trejo MD   150 mcg at 12/15/21 0717    lisinopril (PRINIVIL;ZESTRIL) tablet 20 mg  20 mg Oral BID Isai Trejo MD   20 mg at 12/14/21 0803    metoprolol succinate (TOPROL XL) extended release tablet 100 mg  100 mg Oral QAM AC Isai Trejo MD   100 mg at 12/15/21 0944    NIFEdipine (PROCARDIA XL) extended release tablet 90 mg  90 mg Oral Daily Nabil Mancini MD        rOPINIRole (REQUIP) tablet 4 mg  4 mg Oral Nightly Nabil Mancini MD   4 mg at 12/14/21 2144    senna (SENOKOT) tablet 8.6 mg  1 tablet Oral Daily Nabil Mancini MD        sevelamer (RENVELA) tablet 800 mg  800 mg Oral TID  Nabil Mancini MD        atorvastatin (LIPITOR) tablet 20 mg  20 mg Oral Daily Nabil Mancini MD        niCARdipine (CARDENE) 25 mg in dextrose 5 % 250 mL infusion  3-15 mg/hr IntraVENous Continuous Nabil Mancini MD 25 mL/hr at 12/14/21 2200 2.5 mg/hr at 12/14/21 2200    albuterol (PROVENTIL) nebulizer solution 2.5 mg  2.5 mg Nebulization Q4H PRN Nabil Mancini MD        [Held by provider] insulin lispro (HUMALOG) injection vial 10 Units  10 Units SubCUTAneous TID  Nabil Mancini MD        insulin lispro (HUMALOG) injection vial 10 Units  10 Units SubCUTAneous TID [facility] Nabil Mancini MD   10 Units at 12/15/21 4417    insulin glargine (LANTUS) injection vial 40 Units  40 Units SubCUTAneous Nightly Nabil Mancini MD   40 Units at 12/15/21 0101    insulin lispro (HUMALOG) injection vial 0-6 Units  0-6 Units SubCUTAneous TID  Nabil Mancini MD        insulin lispro (HUMALOG) injection vial 0-3 Units  0-3 Units SubCUTAneous Nightly Nabil Mancini MD           Past Medical History:  Past Medical History:   Diagnosis Date    Arthritis     Chronic kidney disease, stage 5, kidney failure [address] [address]     Closed fracture of right shoulder girdle, with routine healing, subsequent encounter     DM (diabetes mellitus) type II controlled with renal manifestation ([address] 06/1993    Gastroparesis     GERD (gastroesophageal reflux disease)     Hemodialysis patient ([facility] [address]     dialysis on tues, thur, and sat at [facility]    Hypertension     Hypothyroid     Nasal congestion     recent    Neuropathy     Noncompliance with medications     Palliative care patient 10/08/2019    Restless leg syndrome        Past Surgical History:  Past Surgical History: Procedure Laterality Date    BREAST SURGERY Left 2008    infected milk gland removed    BREAST SURGERY Right 5/25/2021    WEDGE EXCISION RIGHT BREAST DUCT WITH LACRIMAL DUCT PROBE, PEC BLOCK performed by Ricardo Robles MD at [address], [facility]      2008    COLONOSCOPY Left 9/17/2020    Dr Reyna Gerard hepatic flexure-Severe tortuosity with severe spasming, 1 yr recall    DIALYSIS FISTULA CREATION Left 1/25/2019    REVISION LEFT UPPER EXTREMITY AV ACCESS WITH LEFT BRACHIAL ARTERY TO LEFT AXILLARY VEIN WITH  INTERPOSITIONAL ARTEGRAFT   performed by Alice Ellison MD at [address]  2012    [address] Right 1/25/2019    INSERTION OF RIGHT INTERNAL JUGULAR HEMODIALYSIS CATHETER performed by Alice Ellison MD at [address] [facility]  08/17/2018    1.  Moderately increased stainable iron identified by special stain in Kupffer cells and occasional hepatocytes. Negative for evidence of significant portal or lobular inflammation. Negative for evidence of significant fibrosis    KS COLONOSCOPY W/BIOPSY SINGLE/MULTIPLE N/A 10/20/2017    Dr Neyda Daniels prep-Tubular AP (-) dysplasia x 2--3 yr recall    KS EGD TRANSORAL BIOPSY SINGLE/MULTIPLE N/A 12/27/2016    Dr Renetta Florian EGD TRANSORAL BIOPSY SINGLE/MULTIPLE N/A 10/20/2017    Dr Sarah Harris (-)   [address] [facility] VASCULAR SURGERY Left 2016    fistula by Dr Danitza Marshall at [address]  10/02/2017    [facility]. Left upper fistulograms/venograms, balloon angioplasty cephalic vein arch 83Z20 conquest.    VASCULAR SURGERY  08/2018    FISTULOGRAM    VASCULAR SURGERY  10/29/2018    [facility]. Left upper fistulograms/venograms    VASCULAR SURGERY  12/19/2018    [facility]. Arch aortogram,left upper arteriograms.  VASCULAR SURGERY  03/06/2019    [facility]. Removal of tunneled dialyis catheter right internal jugular vein.  VASCULAR SURGERY  08/28/2019    [facility]. Left upper extremity fistulogram including venography to the superior vena cava. Balloon angioplasty left subclavian vein with 10mm x 40mm conquest balloon. Balloon angioplasty mid/proximal upper arm cephalic vein with 54YR x 40mm conquest balloon. Venograms after balloon angioplasty. Stent left mid/proximal upper arm cephalic vein stenosis fluency 10mm x 60mm self-expanding covered stent. Balloon     VASCULAR SURGERY  2019    cont angioplasty stent with 10mm x 40mm conquest balloon. Completion venograms left upper extremity.        Family History  Family History   Problem Relation Age of Onset    Colon Cancer Mother          at 63    Colon Polyps Mother     Lung Cancer Father          at 79    Colon Polyps Father     Stomach Cancer Sister     Breast Cancer Sister 27    Depression Daughter 25        committed suicide    Liver Cancer Neg Hx     Liver Disease Neg Hx     Esophageal Cancer Neg Hx     Rectal Cancer Neg Hx        Social History  Social History     Socioeconomic History    Marital status: Single     Spouse name: Not on file    Number of children: 2    Years of education: Not on file    Highest education level: Not on file   Occupational History    Not on file   Tobacco Use    Smoking status: Current Every Day Smoker     Packs/day: 0.50     Years: 33.00     Pack years: 16.50     Types: Cigarettes    Smokeless tobacco: Never Used    Tobacco comment: NOW at 1/4 PD, started at age 24 and has averaged 2 PPD   Vaping Use    Vaping Use: Never used   Substance and Sexual Activity    Alcohol use: No    Drug use: Not Currently     Types: Marijuana Mone Rodarte)    Sexual activity: Not Currently     Partners: Male   Other Topics Concern    Not on file   Social History Narrative    Not on file     Social Determinants of Health     Financial Resource Strain:     Difficulty of Paying Living Expenses: Not on file   Food Insecurity:     Worried About Running Out of Food in the Last Year: Not on file    Kendy of Food in the Last Year: Not on cyanosis or edema  Skin: warm and dry without rash    Labs:  BMP:   Recent Labs     12/14/21  0613 12/14/21  1715 12/15/21  0134   * 129* 128*   K 5.6* 3.0* 4.5   CL 74* 89* 89*   CO2 19* 26 23   PHOS  --  2.2*  --    BUN 74* 30* 26*   CREATININE 7.3* 3.6* 4.0*   CALCIUM 8.6 8.5* 8.3*     CBC:   Recent Labs     12/14/21  0613 12/15/21  0134   WBC 5.7 4.8   HGB 11.8* 11.5*   HCT 36.8* 34.6*   MCV 90.2 85.9   * 113*     LIVER PROFILE:   Recent Labs     12/14/21  0613 12/15/21  0134   AST 15 14   ALT 10 8   LIPASE 79*  --    BILITOT 0.5 0.5   ALKPHOS 134* 105*     PT/INR: No results for input(s): PROTIME, INR in the last 72 hours. APTT: No results for input(s): APTT in the last 72 hours. BNP:  No results for input(s): BNP in the last 72 hours. Ionized Calcium:No results for input(s): IONCA in the last 72 hours. Magnesium:  Recent Labs     12/14/21  1715 12/15/21  0134   MG 2.1 2.1     Phosphorus:  Recent Labs     12/14/21 1715   PHOS 2.2*     HgbA1C:   Recent Labs     12/14/21 0613   LABA1C 13.1*     Hepatic:   Recent Labs     12/14/21  0613 12/15/21  0134   ALKPHOS 134* 105*   ALT 10 8   AST 15 14   PROT 7.1 5.9*   BILITOT 0.5 0.5   LABALBU 4.2 3.4*     Lactic Acid:   Recent Labs     12/14/21  0810   LACTA 1.1     Troponin: No results for input(s): CKTOTAL, CKMB, TROPONINT in the last 72 hours. ABGs: No results for input(s): PH, PCO2, PO2, HCO3, O2SAT in the last 72 hours. CRP:  No results for input(s): CRP in the last 72 hours. Sed Rate:  No results for input(s): SEDRATE in the last 72 hours. Cultures:   No results for input(s): CULTURE in the last 72 hours. Radiology reports as per the Radiologist  Radiology: XR CHEST PORTABLE    Result Date: 12/14/2021  Examination. XR CHEST PORTABLE 12/14/2021 7:59 AM History: Fever. A frontal portable upright view of the chest is obtained and compared with the previous study dated 11/18/2021.  There is moderate improvement in pulmonary vascular congestion. There is a small right basal pleural effusion. There is no pneumothorax. There is persistent moderate cardiomegaly. There is a left subclavian vascular stent in place similar to the previous study. There is moderate diffuse osteopenia. No acute bony abnormality. 1. Moderate improvement in pulmonary vascular congestion since the previous study. 2. A small right basal pleural effusion. 3. A persistent moderate cardiomegaly. 4. Moderate diffuse osteopenia. Signed by Dr Abeba Lees     -End stage renal disease  -Type 2 diabetes with renal disease-severe hyperglycemia  -Hypertensive crisis  -Pseudohyponatremia  -Hyperkalemia  -Metabolic acidosis  -Nausea and vomiting  -Diarrhea  -Hypophosphatemia    Plan:  Agree with resuming oral BP meds. Wean off Cardene. Hold Renvela for low phosphorus. Dialysis is due next on December 16. Follow-up labs. Monitor patient in ICU setting.

## 2021-12-16 LAB
ALBUMIN SERPL-MCNC: 3.3 G/DL (ref 3.5–5.2)
ALP BLD-CCNC: 86 U/L (ref 35–104)
ALT SERPL-CCNC: 6 U/L (ref 5–33)
ANION GAP SERPL CALCULATED.3IONS-SCNC: 12 MMOL/L (ref 7–19)
AST SERPL-CCNC: 13 U/L (ref 5–32)
BASOPHILS ABSOLUTE: 0.1 K/UL (ref 0–0.2)
BASOPHILS RELATIVE PERCENT: 1.9 % (ref 0–1)
BILIRUB SERPL-MCNC: 0.3 MG/DL (ref 0.2–1.2)
BUN BLDV-MCNC: 24 MG/DL (ref 6–20)
CALCIUM SERPL-MCNC: 8.4 MG/DL (ref 8.6–10)
CHLORIDE BLD-SCNC: 95 MMOL/L (ref 98–111)
CO2: 26 MMOL/L (ref 22–29)
CREAT SERPL-MCNC: 3.6 MG/DL (ref 0.5–0.9)
EOSINOPHILS ABSOLUTE: 0.3 K/UL (ref 0–0.6)
EOSINOPHILS RELATIVE PERCENT: 6.2 % (ref 0–5)
GFR AFRICAN AMERICAN: 16
GFR NON-AFRICAN AMERICAN: 13
GLUCOSE BLD-MCNC: 106 MG/DL (ref 70–99)
GLUCOSE BLD-MCNC: 125 MG/DL (ref 70–99)
GLUCOSE BLD-MCNC: 232 MG/DL (ref 70–99)
GLUCOSE BLD-MCNC: 75 MG/DL (ref 70–99)
GLUCOSE BLD-MCNC: 75 MG/DL (ref 74–109)
HCT VFR BLD CALC: 33.6 % (ref 37–47)
HEMOGLOBIN: 10.7 G/DL (ref 12–16)
IMMATURE GRANULOCYTES #: 0 K/UL
LYMPHOCYTES ABSOLUTE: 1.3 K/UL (ref 1.1–4.5)
LYMPHOCYTES RELATIVE PERCENT: 30 % (ref 20–40)
MAGNESIUM: 2.1 MG/DL (ref 1.6–2.6)
MCH RBC QN AUTO: 28.4 PG (ref 27–31)
MCHC RBC AUTO-ENTMCNC: 31.8 G/DL (ref 33–37)
MCV RBC AUTO: 89.1 FL (ref 81–99)
MONOCYTES ABSOLUTE: 0.5 K/UL (ref 0–0.9)
MONOCYTES RELATIVE PERCENT: 12.1 % (ref 0–10)
NEUTROPHILS ABSOLUTE: 2.1 K/UL (ref 1.5–7.5)
NEUTROPHILS RELATIVE PERCENT: 49.6 % (ref 50–65)
PDW BLD-RTO: 15 % (ref 11.5–14.5)
PERFORMED ON: ABNORMAL
PERFORMED ON: NORMAL
PLATELET # BLD: 122 K/UL (ref 130–400)
PMV BLD AUTO: 10.4 FL (ref 9.4–12.3)
POTASSIUM SERPL-SCNC: 3.5 MMOL/L (ref 3.5–5)
RBC # BLD: 3.77 M/UL (ref 4.2–5.4)
SODIUM BLD-SCNC: 133 MMOL/L (ref 136–145)
TOTAL PROTEIN: 6.2 G/DL (ref 6.6–8.7)
WBC # BLD: 4.2 K/UL (ref 4.8–10.8)

## 2021-12-16 PROCEDURE — 6370000000 HC RX 637 (ALT 250 FOR IP): Performed by: INTERNAL MEDICINE

## 2021-12-16 PROCEDURE — 8010000000 HC HEMODIALYSIS ACUTE INPT

## 2021-12-16 PROCEDURE — 80053 COMPREHEN METABOLIC PANEL: CPT

## 2021-12-16 PROCEDURE — 6360000002 HC RX W HCPCS: Performed by: INTERNAL MEDICINE

## 2021-12-16 PROCEDURE — 82947 ASSAY GLUCOSE BLOOD QUANT: CPT

## 2021-12-16 PROCEDURE — 1210000000 HC MED SURG R&B

## 2021-12-16 PROCEDURE — 85025 COMPLETE CBC W/AUTO DIFF WBC: CPT

## 2021-12-16 PROCEDURE — 36415 COLL VENOUS BLD VENIPUNCTURE: CPT

## 2021-12-16 PROCEDURE — 83735 ASSAY OF MAGNESIUM: CPT

## 2021-12-16 RX ORDER — ONDANSETRON 2 MG/ML
4 INJECTION INTRAMUSCULAR; INTRAVENOUS
Status: COMPLETED | OUTPATIENT
Start: 2021-12-16 | End: 2021-12-16

## 2021-12-16 RX ORDER — CLONIDINE HYDROCHLORIDE 0.1 MG/1
0.1 TABLET ORAL
Status: COMPLETED | OUTPATIENT
Start: 2021-12-16 | End: 2021-12-16

## 2021-12-16 RX ADMIN — METOPROLOL SUCCINATE 100 MG: 50 TABLET, EXTENDED RELEASE ORAL at 06:08

## 2021-12-16 RX ADMIN — HYDRALAZINE HYDROCHLORIDE 100 MG: 50 TABLET, FILM COATED ORAL at 06:09

## 2021-12-16 RX ADMIN — INSULIN LISPRO 2 UNITS: 100 INJECTION, SOLUTION INTRAVENOUS; SUBCUTANEOUS at 15:39

## 2021-12-16 RX ADMIN — ONDANSETRON 4 MG: 2 INJECTION, SOLUTION INTRAMUSCULAR; INTRAVENOUS at 09:17

## 2021-12-16 RX ADMIN — SENNOSIDES 8.6 MG: 8.6 TABLET, FILM COATED ORAL at 12:39

## 2021-12-16 RX ADMIN — HYDROCHLOROTHIAZIDE 50 MG: 25 TABLET ORAL at 12:39

## 2021-12-16 RX ADMIN — HYDROCODONE BITARTRATE AND ACETAMINOPHEN 1 TABLET: 5; 325 TABLET ORAL at 21:03

## 2021-12-16 RX ADMIN — DULOXETINE 60 MG: 60 CAPSULE, DELAYED RELEASE ORAL at 12:39

## 2021-12-16 RX ADMIN — LISINOPRIL 20 MG: 10 TABLET ORAL at 21:03

## 2021-12-16 RX ADMIN — NIFEDIPINE 90 MG: 30 TABLET, EXTENDED RELEASE ORAL at 12:39

## 2021-12-16 RX ADMIN — HYDRALAZINE HYDROCHLORIDE 100 MG: 50 TABLET, FILM COATED ORAL at 15:32

## 2021-12-16 RX ADMIN — LISINOPRIL 20 MG: 10 TABLET ORAL at 12:42

## 2021-12-16 RX ADMIN — LEVOTHYROXINE SODIUM 150 MCG: 75 TABLET ORAL at 06:09

## 2021-12-16 RX ADMIN — HYDRALAZINE HYDROCHLORIDE 100 MG: 50 TABLET, FILM COATED ORAL at 21:03

## 2021-12-16 RX ADMIN — ROPINIROLE HYDROCHLORIDE 4 MG: 4 TABLET, FILM COATED ORAL at 21:03

## 2021-12-16 RX ADMIN — ATORVASTATIN CALCIUM 20 MG: 40 TABLET, FILM COATED ORAL at 12:39

## 2021-12-16 RX ADMIN — INSULIN LISPRO 10 UNITS: 100 INJECTION, SOLUTION INTRAVENOUS; SUBCUTANEOUS at 15:39

## 2021-12-16 RX ADMIN — CLONIDINE HYDROCHLORIDE 0.1 MG: 0.1 TABLET ORAL at 10:08

## 2021-12-16 RX ADMIN — ISOSORBIDE MONONITRATE 120 MG: 60 TABLET, EXTENDED RELEASE ORAL at 12:39

## 2021-12-16 ASSESSMENT — ENCOUNTER SYMPTOMS
NAUSEA: 0
COUGH: 0
ABDOMINAL DISTENTION: 0
TROUBLE SWALLOWING: 0
ABDOMINAL PAIN: 0
SHORTNESS OF BREATH: 0
WHEEZING: 0
BLOOD IN STOOL: 0
CONSTIPATION: 0
SORE THROAT: 0
SINUS PAIN: 0
VOMITING: 0
DIARRHEA: 0

## 2021-12-16 ASSESSMENT — PAIN DESCRIPTION - PAIN TYPE: TYPE: ACUTE PAIN

## 2021-12-16 ASSESSMENT — PAIN DESCRIPTION - FREQUENCY: FREQUENCY: INTERMITTENT

## 2021-12-16 ASSESSMENT — PAIN DESCRIPTION - ORIENTATION: ORIENTATION: LOWER

## 2021-12-16 ASSESSMENT — PAIN DESCRIPTION - ONSET: ONSET: ON-GOING

## 2021-12-16 ASSESSMENT — PAIN DESCRIPTION - PROGRESSION: CLINICAL_PROGRESSION: GRADUALLY IMPROVING

## 2021-12-16 ASSESSMENT — PAIN DESCRIPTION - LOCATION: LOCATION: ABDOMEN

## 2021-12-16 ASSESSMENT — PAIN DESCRIPTION - DESCRIPTORS: DESCRIPTORS: ACHING

## 2021-12-16 ASSESSMENT — PAIN SCALES - GENERAL: PAINLEVEL_OUTOF10: 8

## 2021-12-16 ASSESSMENT — PAIN - FUNCTIONAL ASSESSMENT: PAIN_FUNCTIONAL_ASSESSMENT: ACTIVITIES ARE NOT PREVENTED

## 2021-12-16 NOTE — PROGRESS NOTES
[facility]      Progress Note    Patient:  Dian Anthony  YOB: 1970  Date of Service: 12/16/2021  MRN: [id number]   Acct: [de-identified]   Primary Care Physician: ROMINA Bassett  Advance Directive: Full Code  Admit Date: 12/14/2021       Hospital Day: 2    Portions of this note have been copied forward, however, updated to reflect the most current clinical status of this patient. CHIEF COMPLAINT nausea and vomiting    SUBJECTIVE:  Ms. Latoya Cody was resting comfortably in bed this afternoon. Reported nausea earlier in the day during dialysis. Denies nausea or vomiting at this time. Denies abdominal pain. CUMULATIVE HOSPITAL COURSE:   The patient is a 79-year-old female with past medical history of ESRD on dialysis, hypertension, diabetes, gastroparesis, GERD, and restless leg syndrome who presented to [facility] ED with complaints of nausea and vomiting. Ms. Latoya Cody reported nonbloody diarrhea, fatigue and weakness. Reported she had not been able to keep her medications down. Work-up in ED revealed HHS and hypertensive urgency. Patient was admitted to ICU initially for Cardene drip and insulin drip. Patient underwent IVF resuscitation. She was transferred out of ICU on 12/15/2021. Clonidine patch was initiated as well. Review of Systems   Constitutional: Positive for fatigue. Negative for chills, diaphoresis and fever. HENT: Negative for congestion, ear pain, sinus pain, sore throat and trouble swallowing. Eyes: Negative for visual disturbance. Respiratory: Negative for cough, shortness of breath and wheezing. Denies shortness of breath at this time   Cardiovascular: Negative for chest pain, palpitations and leg swelling. Denies chest pain at this time   Gastrointestinal: Negative for abdominal distention, abdominal pain, blood in stool, constipation, diarrhea, nausea and vomiting. Endocrine: Negative for cold intolerance and heat intolerance.    Genitourinary: Negative for difficulty urinating, flank pain, frequency and urgency. Musculoskeletal: Negative for arthralgias and myalgias. Neurological: Positive for weakness. Negative for dizziness, syncope, light-headedness, numbness and headaches. Hematological: Does not bruise/bleed easily. Psychiatric/Behavioral: Negative for agitation, confusion and dysphoric mood. Objective:   VITALS:  BP (!) 184/77   Pulse 69   Temp 97.7 °F (36.5 °C) (Temporal)   Resp 16   Ht 5' 6\" (1.676 m)   Wt 150 lb (68 kg)   SpO2 96%   BMI 24.21 kg/m²   24HR INTAKE/OUTPUT:      Intake/Output Summary (Last 24 hours) at 12/16/2021 1521  Last data filed at 12/16/2021 1423  Gross per 24 hour   Intake 240 ml   Output    Net 240 ml           Physical Exam  Constitutional:       General: She is not in acute distress. Appearance: Normal appearance. She is ill-appearing. She is not toxic-appearing or diaphoretic. HENT:      Head: Normocephalic and atraumatic. Right Ear: External ear normal.      Left Ear: External ear normal.      Nose: Nose normal. No congestion or rhinorrhea. Mouth/Throat:      Mouth: Mucous membranes are moist.      Pharynx: Oropharynx is clear. Eyes:      General: No scleral icterus. Extraocular Movements: Extraocular movements intact. Conjunctiva/sclera: Conjunctivae normal.   Cardiovascular:      Rate and Rhythm: Normal rate and regular rhythm. Pulses: Normal pulses. Heart sounds: Murmur heard. No friction rub. No gallop. Pulmonary:      Effort: Pulmonary effort is normal. No respiratory distress. Breath sounds: Normal breath sounds. No wheezing, rhonchi or rales. Abdominal:      General: Abdomen is flat. Bowel sounds are normal. There is no distension. Palpations: Abdomen is soft. Tenderness: There is no abdominal tenderness. Musculoskeletal:         General: No swelling. Normal range of motion.       Cervical back: Normal range of motion and neck supple. No tenderness. Right lower leg: No edema. Left lower leg: No edema. Skin:     General: Skin is warm and dry. Coloration: Skin is not jaundiced. Findings: No erythema, lesion or rash. Neurological:      General: No focal deficit present. Mental Status: She is alert and oriented to person, place, and time. Mental status is at baseline. Cranial Nerves: No cranial nerve deficit. Sensory: No sensory deficit. Motor: No weakness. Psychiatric:         Mood and Affect: Mood normal.         Behavior: Behavior normal.         Thought Content: Thought content normal.         Judgment: Judgment normal.            Medications:      dextrose      dextrose 5 % and 0.45 % NaCl Stopped (12/14/21 2646)      cloNIDine  1 patch TransDERmal Weekly    DULoxetine  60 mg Oral Daily    vitamin D  50,000 Units Oral Q14 Days    hydrALAZINE  100 mg Oral 3 times per day    hydroCHLOROthiazide  50 mg Oral Daily    isosorbide mononitrate  120 mg Oral Daily    levothyroxine  150 mcg Oral Daily    lisinopril  20 mg Oral BID    metoprolol succinate  100 mg Oral QAM AC    NIFEdipine  90 mg Oral Daily    rOPINIRole  4 mg Oral Nightly    senna  1 tablet Oral Daily    atorvastatin  20 mg Oral Daily    [Held by provider] insulin lispro  10 Units SubCUTAneous TID WC    insulin lispro  10 Units SubCUTAneous TID AC    insulin glargine  40 Units SubCUTAneous Nightly    insulin lispro  0-6 Units SubCUTAneous TID WC    insulin lispro  0-3 Units SubCUTAneous Nightly     glucose, dextrose, glucagon (rDNA), dextrose, potassium chloride, magnesium sulfate, sodium phosphate IVPB **OR** sodium phosphate IVPB **OR** sodium phosphate IVPB, dextrose 5 % and 0.45 % NaCl, hydrALAZINE, labetalol, HYDROcodone-acetaminophen, albuterol  ADULT DIET; Regular; 4 carb choices (60 gm/meal);  Low Fat/Low Chol/High Fiber/2 gm Na     Lab and other Data:     Recent Labs     12/14/21  0613 12/15/21  3904 planning: TBD        Further Orders per Clinical course/attending. Electronically signed by ROMINA Lake CNP on 12/16/2021 at 3:21 PM       EMR Dragon/Transcription disclaimer:   Much of this encounter note is an electronic transcription/translation of spoken language to printed text.  The electronic translation of spoken language may permit erroneous, or at times, nonsensical words or phrases to be inadvertently transcribed; although attempts have made to review the note for such errors, some may still exist.

## 2021-12-16 NOTE — PROGRESS NOTES
Nephrology ([facility]) Progress Note    Patient:  Wendy Ng  YOB: 1970  Date of Service: 12/16/2021  MRN: [id number]   Acct: [de-identified]   Primary Care Physician: ROMINA Cohen  Advance Directive: Full Code  Admit Date: 12/14/2021       Hospital Day: 2  Referring Provider: Jaleel Contreras MD    Patient Seen, Chart, Consults, Notes, Labs, Radiology studies reviewed. Subjective:  Patient is a 59-year-old woman with a past medical history of hypertension, type 2 diabetes and end-stage renal disease.  She has been on chronic maintenance hemodialysis at [facility] on T,T,S.  She was in her regular state of health until recently when she started having nausea recurrent vomiting and diarrhea.  Patient was unable to keep her medications down.  She presented to the emergency room and she was found to have a hypertensive crisis with severe hyperglycemia (>1000).   She was subsequently admitted to ICU.  Renal service was consulted to manage her ESRD.  Patient is s/p dialysis on 12/14. Today, she is on the medical floor but still running high BPs. She was seen and examined on dialysis. Less GI symptoms.          Dialysis   Pt was seen on RRT  Modality: Hemodialysis  Access: Arterial Venous Fistula  Location: Left upper  QB:450  QD: 700  UF: 3.5 liters    Allergies:  Penicillins, Lamisil advanced [terbinafine], Stadol [butorphanol], and Reglan [metoclopramide]    Medicines:  Current Facility-Administered Medications   Medication Dose Route Frequency Provider Last Rate Last Admin    glucose (GLUTOSE) 40 % oral gel 15 g  15 g Oral PRN Gibson Roger MD        dextrose 50 % IV solution  12.5 g IntraVENous PRN Gibson Roger MD        glucagon (rDNA) injection 1 mg  1 mg IntraMUSCular PRN Gibson Roger MD        dextrose 5 % solution  100 mL/hr IntraVENous PRN Gibson Roger MD        potassium chloride 10 mEq/100 mL IVPB (Peripheral Line)  10 mEq IntraVENous PRN Jonnie Cortez  mL/hr at 12/14/21 2207 10 mEq at 12/14/21 2207    magnesium sulfate 1000 mg in dextrose 5% 100 mL IVPB  1,000 mg IntraVENous PRN Jonnie Cortez MD        sodium phosphate 10 mmol in dextrose 5 % 250 mL IVPB  10 mmol IntraVENous PRN Jonnie Cortez MD        Or    sodium phosphate 15 mmol in dextrose 5 % 250 mL IVPB  15 mmol IntraVENous PRN Jonnie Cortez MD   Stopped at 12/15/21 0307    Or    sodium phosphate 20 mmol in dextrose 5 % 500 mL IVPB  20 mmol IntraVENous PRN Jonnie Cortez MD        dextrose 5 % and 0.45 % sodium chloride infusion   IntraVENous Continuous PRN Jonnie Cortez MD   Stopped at 12/14/21 1836    cloNIDine (CATAPRES) 0.3 MG/24HR 1 patch  1 patch TransDERmal Weekly Jonnie Cortez MD        hydrALAZINE (APRESOLINE) injection 10 mg  10 mg IntraVENous Q6H PRN Jonnie Cortez MD        labetalol (NORMODYNE;TRANDATE) injection 20 mg  20 mg IntraVENous Q4H PRN Jonnie Cortez MD        DULoxetine (CYMBALTA) extended release capsule 60 mg  60 mg Oral Daily Jonnie Cortez MD   60 mg at 12/15/21 2201    vitamin D (ERGOCALCIFEROL) capsule 50,000 Units  50,000 Units Oral Q14 Days Jonnie Cortez MD        hydrALAZINE (APRESOLINE) tablet 100 mg  100 mg Oral 3 times per day Jonnie Cortez MD   100 mg at 12/16/21 4687    hydroCHLOROthiazide (HYDRODIURIL) tablet 50 mg  50 mg Oral Daily Jonnie Cortez MD   50 mg at 12/15/21 0836    HYDROcodone-acetaminophen (NORCO) 5-325 MG per tablet 1 tablet  1 tablet Oral Q6H PRN Jonnie Cortez MD   1 tablet at 12/15/21 1058    isosorbide mononitrate (IMDUR) extended release tablet 120 mg  120 mg Oral Daily Jonnie Cortez MD   120 mg at 12/15/21 0836    levothyroxine (SYNTHROID) tablet 150 mcg  150 mcg Oral Daily Jonnie Cortez MD   150 mcg at 12/16/21 3221    lisinopril (PRINIVIL;ZESTRIL) tablet 20 mg  20 mg Oral BID Jonnie Cortez MD   20 mg at 12/15/21 4270    metoprolol succinate (TOPROL XL) extended release tablet 100 mg 100 mg Oral QAM AC Isai Trejo MD   100 mg at 12/16/21 1877    NIFEdipine (PROCARDIA XL) extended release tablet 90 mg  90 mg Oral Daily Isai Trejo MD   90 mg at 12/15/21 0955    rOPINIRole (REQUIP) tablet 4 mg  4 mg Oral Nightly Isai Trejo MD   4 mg at 12/15/21 2234    senna (SENOKOT) tablet 8.6 mg  1 tablet Oral Daily Isai Trejo MD        atorvastatin (LIPITOR) tablet 20 mg  20 mg Oral Daily Isai Trejo MD   20 mg at 12/15/21 0836    albuterol (PROVENTIL) nebulizer solution 2.5 mg  2.5 mg Nebulization Q4H PRN Isai Trejo MD        [Held by provider] insulin lispro (HUMALOG) injection vial 10 Units  10 Units SubCUTAneous TID  Isai Trejo MD        insulin lispro (HUMALOG) injection vial 10 Units  10 Units SubCUTAneous TID [facility] Isai Trejo MD   10 Units at 12/15/21 8136    insulin glargine (LANTUS) injection vial 40 Units  40 Units SubCUTAneous Nightly Isai Trejo MD   40 Units at 12/15/21 0101    insulin lispro (HUMALOG) injection vial 0-6 Units  0-6 Units SubCUTAneous TID  Isai Trejo MD        insulin lispro (HUMALOG) injection vial 0-3 Units  0-3 Units SubCUTAneous Nightly Isai Trejo MD           Past Medical History:  Past Medical History:   Diagnosis Date    Arthritis     Chronic kidney disease, stage 5, kidney failure [address] [address]     Closed fracture of right shoulder girdle, with routine healing, subsequent encounter     DM (diabetes mellitus) type II controlled with renal manifestation ([address] 06/1993    Gastroparesis     GERD (gastroesophageal reflux disease)     Hemodialysis patient ([facility] [address]     dialysis on tues, thur, and sat at [facility]    Hypertension     Hypothyroid     Nasal congestion     recent    Neuropathy     Noncompliance with medications     Palliative care patient 10/08/2019    Restless leg syndrome        Past Surgical History:  Past Surgical History:   Procedure Laterality Date    BREAST SURGERY Left 2008    infected milk gland removed    BREAST SURGERY Right 5/25/2021    WEDGE EXCISION RIGHT BREAST DUCT WITH LACRIMAL DUCT PROBE, PEC BLOCK performed by Amanda Diaz MD at [address], LAPAROSCOPIC      2008    COLONOSCOPY Left 9/17/2020    Dr Yesi Jeffries hepatic flexure-Severe tortuosity with severe spasming, 1 yr recall    DIALYSIS FISTULA CREATION Left 1/25/2019    REVISION LEFT UPPER EXTREMITY AV ACCESS WITH LEFT BRACHIAL ARTERY TO LEFT AXILLARY VEIN WITH  INTERPOSITIONAL ARTEGRAFT   performed by Khari Gomez MD at [address]  2012    [address] Right 1/25/2019    INSERTION OF RIGHT INTERNAL JUGULAR HEMODIALYSIS CATHETER performed by Khari Gomez MD at [facility]  08/17/2018    1.  Moderately increased stainable iron identified by special stain in Kupffer cells and occasional hepatocytes. Negative for evidence of significant portal or lobular inflammation. Negative for evidence of significant fibrosis    [place] COLONOSCOPY W/BIOPSY SINGLE/MULTIPLE N/A 10/20/2017    Dr Brian Pina prep-Tubular AP (-) dysplasia x 2--3 yr recall    [place] EGD TRANSORAL BIOPSY SINGLE/MULTIPLE N/A 12/27/2016    Dr John Mesa EGD TRANSORAL BIOPSY SINGLE/MULTIPLE N/A 10/20/2017    Dr Ball Sensing (-)   [address] [facility] VASCULAR SURGERY Left 2016    fistula by Dr Lilliam Alvarado at [address]  10/02/2017    [facility]. Left upper fistulograms/venograms, balloon angioplasty cephalic vein arch [id number] conquest.    VASCULAR SURGERY  08/2018    FISTULOGRAM    VASCULAR SURGERY  10/29/2018    [facility]. Left upper fistulograms/venograms    VASCULAR SURGERY  12/19/2018    [facility]. Arch aortogram,left upper arteriograms.  VASCULAR SURGERY  03/06/2019    [facility]. Removal of tunneled dialyis catheter right internal jugular vein.  VASCULAR SURGERY  08/28/2019    [facility]. Left upper extremity fistulogram including venography to the superior vena cava. Balloon angioplasty left subclavian vein with 10mm x 40mm conquest balloon. Balloon angioplasty mid/proximal upper arm cephalic vein with 66KV x 40mm conquest balloon. Venograms after balloon angioplasty. Stent left mid/proximal upper arm cephalic vein stenosis fluency 10mm x 60mm self-expanding covered stent. Balloon     VASCULAR SURGERY  2019    cont angioplasty stent with 10mm x 40mm conquest balloon. Completion venograms left upper extremity. Family History  Family History   Problem Relation Age of Onset    Colon Cancer Mother          at 63    Colon Polyps Mother     Lung Cancer Father          at 79    Colon Polyps Father     Stomach Cancer Sister     Breast Cancer Sister 27    Depression Daughter 25        committed suicide    Liver Cancer Neg Hx     Liver Disease Neg Hx     Esophageal Cancer Neg Hx     Rectal Cancer Neg Hx        Social History  Social History     Socioeconomic History    Marital status: Single     Spouse name: Not on file    Number of children: 2    Years of education: Not on file    Highest education level: Not on file   Occupational History    Not on file   Tobacco Use    Smoking status: Current Every Day Smoker     Packs/day: 0.50     Years: 33.00     Pack years: 16.50     Types: Cigarettes    Smokeless tobacco: Never Used    Tobacco comment: NOW at 1/4 PD, started at age 24 and has averaged 2 PPD   Vaping Use    Vaping Use: Never used   Substance and Sexual Activity    Alcohol use: No    Drug use: Not Currently     Types: Marijuana Nicole Bachelor)    Sexual activity: Not Currently     Partners: Male   Other Topics Concern    Not on file   Social History Narrative    Not on file     Social Determinants of Health     Financial Resource Strain:     Difficulty of Paying Living Expenses: Not on file   Food Insecurity:     Worried About Running Out of Food in the Last Year: Not on file    Kendy of Food in the Last Year: Not on file   Transportation Needs:     Lack of Transportation (Medical):  Not on file  Lack of Transportation (Non-Medical): Not on file   Physical Activity:     Days of Exercise per Week: Not on file    Minutes of Exercise per Session: Not on file   Stress:     Feeling of Stress : Not on file   Social Connections:     Frequency of Communication with Friends and Family: Not on file    Frequency of Social Gatherings with Friends and Family: Not on file    Attends Mosque Services: Not on file    Active Member of [address] [place] [address] or Organizations: Not on file    Attends Club or Organization Meetings: Not on file    Marital Status: Not on file   Intimate Partner Violence:     Fear of Current or Ex-Partner: Not on file    Emotionally Abused: Not on file    Physically Abused: Not on file    Sexually Abused: Not on file   Housing Stability:     Unable to Pay for Housing in the Last Year: Not on file    Number of [place] in the Last Year: Not on file    Unstable Housing in the Last Year: Not on file         Review of Systems:  Reviewed with the patient at the bedside, 6 points reviewed and negative except as noted above. Objective:  Blood pressure (!) 211/102, pulse 65, temperature 97.2 °F (36.2 °C), temperature source Temporal, resp. rate 18, height 5' 6\" (1.676 m), weight 150 lb (68 kg), SpO2 97 %.     Intake/Output Summary (Last 24 hours) at 12/16/2021 1123  Last data filed at 12/15/2021 1200  Gross per 24 hour   Intake 60 ml   Output    Net 60 ml     General: awake/alert   Chest:  clear to auscultation bilaterally without respiratory distress  CVS: regular rate and rhythm  Abdominal: soft, nontender, normal bowel sounds  Extremities: no cyanosis or edema  Skin: warm and dry without rash    Labs:  BMP:   Recent Labs     12/14/21  1715 12/15/21  0134 12/16/21  0908   * 128* 133*   K 3.0* 4.5 3.5   CL 89* 89* 95*   CO2 26 23 26   PHOS 2.2*  --   --    BUN 30* 26* 24*   CREATININE 3.6* 4.0* 3.6*   CALCIUM 8.5* 8.3* 8.4*     CBC:   Recent Labs     12/14/21  0613 12/15/21  0134 12/16/21  0909   WBC 5.7 4.8 4.2*   HGB 11.8* 11.5* 10.7*   HCT 36.8* 34.6* 33.6*   MCV 90.2 85.9 89.1   * 113* 122*     LIVER PROFILE:   Recent Labs     12/14/21  0613 12/15/21  0134 12/16/21  0908   AST 15 14 13   ALT 10 8 6   LIPASE 79*  --   --    BILITOT 0.5 0.5 0.3   ALKPHOS 134* 105* 86     PT/INR: No results for input(s): PROTIME, INR in the last 72 hours. APTT: No results for input(s): APTT in the last 72 hours. BNP:  No results for input(s): BNP in the last 72 hours. Ionized Calcium:No results for input(s): IONCA in the last 72 hours. Magnesium:  Recent Labs     12/14/21  1715 12/15/21  0134 12/16/21  0908   MG 2.1 2.1 2.1     Phosphorus:  Recent Labs     12/14/21  1715   PHOS 2.2*     HgbA1C:   Recent Labs     12/14/21  0613   LABA1C 13.1*     Hepatic:   Recent Labs     12/14/21  0613 12/15/21  0134 12/16/21  0908   ALKPHOS 134* 105* 86   ALT 10 8 6   AST 15 14 13   PROT 7.1 5.9* 6.2*   BILITOT 0.5 0.5 0.3   LABALBU 4.2 3.4* 3.3*     Lactic Acid:   Recent Labs     12/14/21  0810   LACTA 1.1     Troponin: No results for input(s): CKTOTAL, CKMB, TROPONINT in the last 72 hours. ABGs: No results for input(s): PH, PCO2, PO2, HCO3, O2SAT in the last 72 hours. CRP:  No results for input(s): CRP in the last 72 hours. Sed Rate:  No results for input(s): SEDRATE in the last 72 hours. Cultures:   No results for input(s): CULTURE in the last 72 hours. Recent Labs     12/14/21  0810 12/14/21  0905   BC No Growth to date. Any change in status will be called. --    BLOODCULT2  --  No Growth to date. Any change in status will be called. No results for input(s): CXSURG in the last 72 hours. Radiology reports as per the Radiologist  Radiology: XR CHEST PORTABLE    Result Date: 12/14/2021  Examination. XR CHEST PORTABLE 12/14/2021 7:59 AM History: Fever. A frontal portable upright view of the chest is obtained and compared with the previous study dated 11/18/2021.  There is moderate improvement in pulmonary vascular congestion. There is a small right basal pleural effusion. There is no pneumothorax. There is persistent moderate cardiomegaly. There is a left subclavian vascular stent in place similar to the previous study. There is moderate diffuse osteopenia. No acute bony abnormality. 1. Moderate improvement in pulmonary vascular congestion since the previous study. 2. A small right basal pleural effusion. 3. A persistent moderate cardiomegaly. 4. Moderate diffuse osteopenia. Signed by Dr Yovani High   -End stage renal disease  -Type 2 diabetes with renal disease-severe hyperglycemia  -Hypertensive crisis  -Pseudohyponatremia  -Hyperkalemia  -Metabolic acidosis  -Nausea and vomiting  -Diarrhea  -Hypophosphatemia    Plan:  Dialysis as above, follow up labs. Continue home BP meds. Agree with clonidine 0.1 mg po x 1 now.      Zhanna Pitts MD, MD  12/16/21  11:23 AM

## 2021-12-17 LAB
ALBUMIN SERPL-MCNC: 3.5 G/DL (ref 3.5–5.2)
ALP BLD-CCNC: 90 U/L (ref 35–104)
ALT SERPL-CCNC: 8 U/L (ref 5–33)
ANION GAP SERPL CALCULATED.3IONS-SCNC: 13 MMOL/L (ref 7–19)
AST SERPL-CCNC: 16 U/L (ref 5–32)
BASOPHILS ABSOLUTE: 0.1 K/UL (ref 0–0.2)
BASOPHILS RELATIVE PERCENT: 1.8 % (ref 0–1)
BILIRUB SERPL-MCNC: 0.4 MG/DL (ref 0.2–1.2)
BUN BLDV-MCNC: 23 MG/DL (ref 6–20)
CALCIUM SERPL-MCNC: 8.6 MG/DL (ref 8.6–10)
CHLORIDE BLD-SCNC: 94 MMOL/L (ref 98–111)
CO2: 25 MMOL/L (ref 22–29)
CREAT SERPL-MCNC: 4.3 MG/DL (ref 0.5–0.9)
EOSINOPHILS ABSOLUTE: 0.3 K/UL (ref 0–0.6)
EOSINOPHILS RELATIVE PERCENT: 7.3 % (ref 0–5)
GFR AFRICAN AMERICAN: 13
GFR NON-AFRICAN AMERICAN: 11
GLUCOSE BLD-MCNC: 164 MG/DL (ref 70–99)
GLUCOSE BLD-MCNC: 219 MG/DL (ref 70–99)
GLUCOSE BLD-MCNC: 224 MG/DL (ref 74–109)
GLUCOSE BLD-MCNC: 244 MG/DL (ref 70–99)
GLUCOSE BLD-MCNC: 365 MG/DL (ref 70–99)
GLUCOSE BLD-MCNC: 53 MG/DL (ref 70–99)
GLUCOSE BLD-MCNC: 70 MG/DL (ref 70–99)
HCT VFR BLD CALC: 33.8 % (ref 37–47)
HEMOGLOBIN: 10.4 G/DL (ref 12–16)
IMMATURE GRANULOCYTES #: 0 K/UL
LYMPHOCYTES ABSOLUTE: 1.2 K/UL (ref 1.1–4.5)
LYMPHOCYTES RELATIVE PERCENT: 28.1 % (ref 20–40)
MAGNESIUM: 2.2 MG/DL (ref 1.6–2.6)
MCH RBC QN AUTO: 28.6 PG (ref 27–31)
MCHC RBC AUTO-ENTMCNC: 30.8 G/DL (ref 33–37)
MCV RBC AUTO: 92.9 FL (ref 81–99)
MONOCYTES ABSOLUTE: 0.6 K/UL (ref 0–0.9)
MONOCYTES RELATIVE PERCENT: 12.5 % (ref 0–10)
NEUTROPHILS ABSOLUTE: 2.2 K/UL (ref 1.5–7.5)
NEUTROPHILS RELATIVE PERCENT: 49.8 % (ref 50–65)
PDW BLD-RTO: 14.9 % (ref 11.5–14.5)
PERFORMED ON: ABNORMAL
PERFORMED ON: NORMAL
PLATELET # BLD: 119 K/UL (ref 130–400)
PMV BLD AUTO: 10.5 FL (ref 9.4–12.3)
POTASSIUM SERPL-SCNC: 5 MMOL/L (ref 3.5–5)
RBC # BLD: 3.64 M/UL (ref 4.2–5.4)
SODIUM BLD-SCNC: 132 MMOL/L (ref 136–145)
TOTAL PROTEIN: 5.9 G/DL (ref 6.6–8.7)
WBC # BLD: 4.4 K/UL (ref 4.8–10.8)

## 2021-12-17 PROCEDURE — 6370000000 HC RX 637 (ALT 250 FOR IP): Performed by: NURSE PRACTITIONER

## 2021-12-17 PROCEDURE — 83735 ASSAY OF MAGNESIUM: CPT

## 2021-12-17 PROCEDURE — 6360000002 HC RX W HCPCS: Performed by: INTERNAL MEDICINE

## 2021-12-17 PROCEDURE — 80053 COMPREHEN METABOLIC PANEL: CPT

## 2021-12-17 PROCEDURE — 6370000000 HC RX 637 (ALT 250 FOR IP): Performed by: INTERNAL MEDICINE

## 2021-12-17 PROCEDURE — 1210000000 HC MED SURG R&B

## 2021-12-17 PROCEDURE — 82947 ASSAY GLUCOSE BLOOD QUANT: CPT

## 2021-12-17 PROCEDURE — 6370000000 HC RX 637 (ALT 250 FOR IP): Performed by: EMERGENCY MEDICINE

## 2021-12-17 PROCEDURE — 85025 COMPLETE CBC W/AUTO DIFF WBC: CPT

## 2021-12-17 RX ORDER — MINOXIDIL 2.5 MG/1
5 TABLET ORAL DAILY
Status: CANCELLED | OUTPATIENT
Start: 2021-12-17

## 2021-12-17 RX ORDER — ONDANSETRON 2 MG/ML
4 INJECTION INTRAMUSCULAR; INTRAVENOUS EVERY 6 HOURS PRN
Status: DISCONTINUED | OUTPATIENT
Start: 2021-12-17 | End: 2021-12-22 | Stop reason: HOSPADM

## 2021-12-17 RX ORDER — MINOXIDIL 2.5 MG/1
2.5 TABLET ORAL DAILY
Status: DISCONTINUED | OUTPATIENT
Start: 2021-12-17 | End: 2021-12-22 | Stop reason: HOSPADM

## 2021-12-17 RX ADMIN — INSULIN LISPRO 10 UNITS: 100 INJECTION, SOLUTION INTRAVENOUS; SUBCUTANEOUS at 08:29

## 2021-12-17 RX ADMIN — HYDROCHLOROTHIAZIDE 50 MG: 25 TABLET ORAL at 09:09

## 2021-12-17 RX ADMIN — INSULIN LISPRO 5 UNITS: 100 INJECTION, SOLUTION INTRAVENOUS; SUBCUTANEOUS at 08:30

## 2021-12-17 RX ADMIN — LISINOPRIL 20 MG: 10 TABLET ORAL at 22:25

## 2021-12-17 RX ADMIN — MINOXIDIL 2.5 MG: 2.5 TABLET ORAL at 11:44

## 2021-12-17 RX ADMIN — ONDANSETRON 4 MG: 2 INJECTION INTRAMUSCULAR; INTRAVENOUS at 19:26

## 2021-12-17 RX ADMIN — NIFEDIPINE 90 MG: 30 TABLET, EXTENDED RELEASE ORAL at 09:09

## 2021-12-17 RX ADMIN — HYDRALAZINE HYDROCHLORIDE 10 MG: 20 INJECTION INTRAMUSCULAR; INTRAVENOUS at 17:30

## 2021-12-17 RX ADMIN — Medication 15 G: at 16:54

## 2021-12-17 RX ADMIN — LISINOPRIL 20 MG: 10 TABLET ORAL at 09:09

## 2021-12-17 RX ADMIN — ATORVASTATIN CALCIUM 20 MG: 40 TABLET, FILM COATED ORAL at 09:09

## 2021-12-17 RX ADMIN — ROPINIROLE HYDROCHLORIDE 4 MG: 4 TABLET, FILM COATED ORAL at 22:25

## 2021-12-17 RX ADMIN — INSULIN LISPRO 2 UNITS: 100 INJECTION, SOLUTION INTRAVENOUS; SUBCUTANEOUS at 11:37

## 2021-12-17 RX ADMIN — SENNOSIDES 8.6 MG: 8.6 TABLET, FILM COATED ORAL at 09:10

## 2021-12-17 RX ADMIN — LEVOTHYROXINE SODIUM 150 MCG: 75 TABLET ORAL at 09:12

## 2021-12-17 RX ADMIN — HYDRALAZINE HYDROCHLORIDE 10 MG: 20 INJECTION INTRAMUSCULAR; INTRAVENOUS at 09:07

## 2021-12-17 RX ADMIN — DULOXETINE 60 MG: 60 CAPSULE, DELAYED RELEASE ORAL at 09:09

## 2021-12-17 RX ADMIN — HYDRALAZINE HYDROCHLORIDE 100 MG: 50 TABLET, FILM COATED ORAL at 14:27

## 2021-12-17 RX ADMIN — HYDROCODONE BITARTRATE AND ACETAMINOPHEN 1 TABLET: 5; 325 TABLET ORAL at 22:25

## 2021-12-17 RX ADMIN — ISOSORBIDE MONONITRATE 120 MG: 60 TABLET, EXTENDED RELEASE ORAL at 09:09

## 2021-12-17 RX ADMIN — INSULIN LISPRO 10 UNITS: 100 INJECTION, SOLUTION INTRAVENOUS; SUBCUTANEOUS at 11:42

## 2021-12-17 RX ADMIN — HYDRALAZINE HYDROCHLORIDE 100 MG: 50 TABLET, FILM COATED ORAL at 22:25

## 2021-12-17 RX ADMIN — HYDRALAZINE HYDROCHLORIDE 100 MG: 50 TABLET, FILM COATED ORAL at 07:35

## 2021-12-17 RX ADMIN — METOPROLOL SUCCINATE 100 MG: 50 TABLET, EXTENDED RELEASE ORAL at 07:35

## 2021-12-17 ASSESSMENT — ENCOUNTER SYMPTOMS
NAUSEA: 1
DIARRHEA: 0
SHORTNESS OF BREATH: 0
CHEST TIGHTNESS: 0
ABDOMINAL PAIN: 0
SORE THROAT: 0
CONSTIPATION: 0
VOMITING: 0
COLOR CHANGE: 0

## 2021-12-17 ASSESSMENT — PAIN SCALES - GENERAL
PAINLEVEL_OUTOF10: 0
PAINLEVEL_OUTOF10: 7

## 2021-12-17 NOTE — PROGRESS NOTES
Negative for difficulty urinating, flank pain, frequency and urgency. Musculoskeletal: Negative for arthralgias and myalgias. Skin: Negative for color change and wound. Neurological: Negative for dizziness, light-headedness and headaches. Hematological: Negative for adenopathy. Psychiatric/Behavioral: Negative for agitation and confusion. 14 point review of systems is negative except as specifically addressed above. Objective:   VITALS:  BP (!) 190/80   Pulse 67   Temp 99.3 °F (37.4 °C) (Temporal)   Resp 16   Ht 5' 6\" (1.676 m)   Wt 150 lb (68 kg)   SpO2 95%   BMI 24.21 kg/m²   24HR INTAKE/OUTPUT:    Intake/Output Summary (Last 24 hours) at 12/17/2021 1329  Last data filed at 12/17/2021 0947  Gross per 24 hour   Intake 1320 ml   Output 220 ml   Net 1100 ml       Physical Exam  Vitals and nursing note reviewed. Constitutional:       Appearance: She is ill-appearing (Chronically). Comments: Appears much older than stated age   HENT:      Head: Normocephalic. Mouth/Throat:      Mouth: Mucous membranes are moist.      Comments: Multiple missing and broken teeth  Eyes:      Conjunctiva/sclera: Conjunctivae normal.      Pupils: Pupils are equal, round, and reactive to light. Cardiovascular:      Rate and Rhythm: Normal rate and regular rhythm. Pulses: Normal pulses. Heart sounds: Normal heart sounds. Pulmonary:      Effort: Pulmonary effort is normal.      Breath sounds: Normal breath sounds. Abdominal:      General: Abdomen is flat. Bowel sounds are normal. There is no distension. Palpations: Abdomen is soft. Tenderness: There is no abdominal tenderness. There is no guarding. Musculoskeletal:         General: Normal range of motion. Cervical back: Normal range of motion and neck supple. Right lower leg: No edema. Left lower leg: No edema. Skin:     General: Skin is warm and dry.       Capillary Refill: Capillary refill takes less than 2 seconds. Neurological:      General: No focal deficit present. Mental Status: She is alert and oriented to person, place, and time. Psychiatric:         Mood and Affect: Mood normal.         Behavior: Behavior normal.         Thought Content: Thought content normal.         Judgment: Judgment normal.             Medications:      dextrose      dextrose 5 % and 0.45 % NaCl Stopped (12/14/21 1836)      minoxidil  2.5 mg Oral Daily    cloNIDine  1 patch TransDERmal Weekly    DULoxetine  60 mg Oral Daily    vitamin D  50,000 Units Oral Q14 Days    hydrALAZINE  100 mg Oral 3 times per day    hydroCHLOROthiazide  50 mg Oral Daily    isosorbide mononitrate  120 mg Oral Daily    levothyroxine  150 mcg Oral Daily    lisinopril  20 mg Oral BID    metoprolol succinate  100 mg Oral QAM AC    NIFEdipine  90 mg Oral Daily    rOPINIRole  4 mg Oral Nightly    senna  1 tablet Oral Daily    atorvastatin  20 mg Oral Daily    [Held by provider] insulin lispro  10 Units SubCUTAneous TID WC    insulin lispro  10 Units SubCUTAneous TID AC    insulin glargine  40 Units SubCUTAneous Nightly    insulin lispro  0-6 Units SubCUTAneous TID WC    insulin lispro  0-3 Units SubCUTAneous Nightly     glucose, dextrose, glucagon (rDNA), dextrose, potassium chloride, magnesium sulfate, sodium phosphate IVPB **OR** sodium phosphate IVPB **OR** sodium phosphate IVPB, dextrose 5 % and 0.45 % NaCl, hydrALAZINE, labetalol, HYDROcodone-acetaminophen, albuterol  ADULT DIET; Regular; 4 carb choices (60 gm/meal);  Low Fat/Low Chol/High Fiber/2 gm Na     Lab and other Data:     Recent Labs     12/15/21  0134 12/16/21  0909 12/17/21  0157   WBC 4.8 4.2* 4.4*   HGB 11.5* 10.7* 10.4*   * 122* 119*     Recent Labs     12/15/21  0134 12/16/21  0908 12/17/21  0157   * 133* 132*   K 4.5 3.5 5.0   CL 89* 95* 94*   CO2 23 26 25   BUN 26* 24* 23*   CREATININE 4.0* 3.6* 4.3*   GLUCOSE 306* 75 224*     Recent Labs 12/15/21  0134 12/16/21  0908 12/17/21  0157   AST 14 13 16   ALT 8 6 8   BILITOT 0.5 0.3 0.4   ALKPHOS 105* 86 90     Troponin T: No results for input(s): TROPONINI in the last 72 hours. Pro-BNP: No results for input(s): BNP in the last 72 hours. INR: No results for input(s): INR in the last 72 hours. UA:No results for input(s): NITRITE, COLORU, PHUR, LABCAST, WBCUA, RBCUA, MUCUS, TRICHOMONAS, YEAST, BACTERIA, CLARITYU, SPECGRAV, LEUKOCYTESUR, UROBILINOGEN, BILIRUBINUR, BLOODU, GLUCOSEU, AMORPHOUS in the last 72 hours. Invalid input(s): Becky Diane  A1C: No results for input(s): LABA1C in the last 72 hours. ABG:No results for input(s): PHART, FIU9CJM, PO2ART, GWZ5DWK, BEART, HGBAE, F6XEOCIP, CARBOXHGBART in the last 72 hours. RAD:   XR CHEST PORTABLE    Result Date: 12/14/2021  1. Moderate improvement in pulmonary vascular congestion since the previous study. 2. A small right basal pleural effusion. 3. A persistent moderate cardiomegaly. 4. Moderate diffuse osteopenia.  Signed by Dr Ed Gleason         Assessment/Plan   Principal Problem:    Hyperosmolar hyperglycemic state (HHS) ([facility] [address] / Type 2 diabetes mellitus with chronic kidney disease on chronic dialysis, with long-term current use of insulin (HCC)   -Transitioned off of insulin drip   -Basal and prandial insulin   -Sliding scale insulin   -Accu-Cheks   -Hypoglycemia protocol in place   -DM teaching    Active Problems:  ESRD (end stage renal disease) on dialysis [facility])   -Nephrology managing dialysis care      Hypertensive emergency   -Transitioned off Cardene drip   -Oral medications resumed   -Minoxidil added today   -Every 4 vital signs      Generalized weakness   --noted      ROMINA Robert CNP, 12/17/2021 1:29 PM

## 2021-12-17 NOTE — PROGRESS NOTES
Nephrology ([facility]) Progress Note    Patient:  Andi Real  YOB: 1970  Date of Service: 12/17/2021  MRN: [id number]   Acct: [de-identified]   Primary Care Physician: ROMINA Noguera  Advance Directive: Full Code  Admit Date: 12/14/2021       Hospital Day: 3  Referring Provider: Qi Berry MD    Patient Seen, Chart, Consults notes, Labs, Radiology studies reviewed. Subjective:  Patient is a 58-year-old woman with a past medical history of hypertension, type 2 diabetes and end-stage renal disease.  She has been on chronic maintenance hemodialysis at [facility] on T,T,S.  She was in her regular state of health until recently when she started having nausea recurrent vomiting and diarrhea.  Patient was unable to keep her medications down.  She presented to the emergency room and she was found to have a hypertensive crisis with severe hyperglycemia (>1000).   She was subsequently admitted to ICU.  Renal service was consulted to manage her ESRD.  Patient is s/p dialysis on 12/14. Today, she is on the medical floor but still running high BPs. She is s/p dialysis on 12/16. Today, she continues to have high blood pressure readings and was having some headache.     Allergies:  Penicillins, Lamisil advanced [terbinafine], Stadol [butorphanol], and Reglan [metoclopramide]    Medicines:  Current Facility-Administered Medications   Medication Dose Route Frequency Provider Last Rate Last Admin    minoxidil (LONITEN) tablet 2.5 mg  2.5 mg Oral Daily ROMINA Busch - CNP   2.5 mg at 12/17/21 1144    glucose (GLUTOSE) 40 % oral gel 15 g  15 g Oral PRN Ren Irvin MD        dextrose 50 % IV solution  12.5 g IntraVENous PRN Ren Irvin MD        glucagon (rDNA) injection 1 mg  1 mg IntraMUSCular PRN Ren Irvin MD        dextrose 5 % solution  100 mL/hr IntraVENous PRN Ren Irvin MD        potassium chloride 10 mEq/100 mL IVPB (Peripheral Line)  10 mEq IntraVENous PRN Trinity Way  mL/hr at 12/14/21 2207 10 mEq at 12/14/21 2207    magnesium sulfate 1000 mg in dextrose 5% 100 mL IVPB  1,000 mg IntraVENous PRN Trinity Way MD        sodium phosphate 10 mmol in dextrose 5 % 250 mL IVPB  10 mmol IntraVENous PRN Trinity Way MD        Or    sodium phosphate 15 mmol in dextrose 5 % 250 mL IVPB  15 mmol IntraVENous PRN Trinity Way MD   Stopped at 12/15/21 0307    Or    sodium phosphate 20 mmol in dextrose 5 % 500 mL IVPB  20 mmol IntraVENous PRN Trinity Way MD        dextrose 5 % and 0.45 % sodium chloride infusion   IntraVENous Continuous PRN Trinity Way MD   Stopped at 12/14/21 1836    cloNIDine (CATAPRES) 0.3 MG/24HR 1 patch  1 patch TransDERmal Weekly Trinity Way MD        hydrALAZINE (APRESOLINE) injection 10 mg  10 mg IntraVENous Q6H PRN Trinity Way MD   10 mg at 12/17/21 0682    labetalol (NORMODYNE;TRANDATE) injection 20 mg  20 mg IntraVENous Q4H PRN Trinity Way MD        DULoxetine (CYMBALTA) extended release capsule 60 mg  60 mg Oral Daily Trinity Way MD   60 mg at 12/17/21 1231    vitamin D (ERGOCALCIFEROL) capsule 50,000 Units  50,000 Units Oral Q14 Days Trinity Way MD        hydrALAZINE (APRESOLINE) tablet 100 mg  100 mg Oral 3 times per day Triniyt Way MD   100 mg at 12/17/21 0735    hydroCHLOROthiazide (HYDRODIURIL) tablet 50 mg  50 mg Oral Daily Trinity Way MD   50 mg at 12/17/21 0909    HYDROcodone-acetaminophen (NORCO) 5-325 MG per tablet 1 tablet  1 tablet Oral Q6H PRN Trinity Way MD   1 tablet at 12/16/21 2103    isosorbide mononitrate (IMDUR) extended release tablet 120 mg  120 mg Oral Daily Trinity Way MD   120 mg at 12/17/21 2254    levothyroxine (SYNTHROID) tablet 150 mcg  150 mcg Oral Daily Trinity Way MD   150 mcg at 12/17/21 0912    lisinopril (PRINIVIL;ZESTRIL) tablet 20 mg  20 mg Oral BID Trinity Way MD   20 mg at 12/17/21 0909    metoprolol succinate (TOPROL XL) extended release tablet 100 mg  100 mg Oral QAM AC Claire Arellano MD   100 mg at 12/17/21 0735    NIFEdipine (PROCARDIA XL) extended release tablet 90 mg  90 mg Oral Daily Claire Arellano MD   90 mg at 12/17/21 8670    rOPINIRole (REQUIP) tablet 4 mg  4 mg Oral Nightly Claire Arellano MD   4 mg at 12/16/21 2103    senna (SENOKOT) tablet 8.6 mg  1 tablet Oral Daily Claire Arellano MD   8.6 mg at 12/17/21 9545    atorvastatin (LIPITOR) tablet 20 mg  20 mg Oral Daily Claire Arellano MD   20 mg at 12/17/21 1218    albuterol (PROVENTIL) nebulizer solution 2.5 mg  2.5 mg Nebulization Q4H PRN Claire Arellano MD        [Held by provider] insulin lispro (HUMALOG) injection vial 10 Units  10 Units SubCUTAneous TID  Claire Arellano MD        insulin lispro (HUMALOG) injection vial 10 Units  10 Units SubCUTAneous TID [facility] Claire Arellano MD   10 Units at 12/17/21 1142    insulin glargine (LANTUS) injection vial 40 Units  40 Units SubCUTAneous Nightly Claire Arellano MD   40 Units at 12/15/21 0101    insulin lispro (HUMALOG) injection vial 0-6 Units  0-6 Units SubCUTAneous TID  Claire Arellano MD   2 Units at 12/17/21 1137    insulin lispro (HUMALOG) injection vial 0-3 Units  0-3 Units SubCUTAneous Nightly Claire Arellano MD           Past Medical History:  Past Medical History:   Diagnosis Date    Arthritis     Chronic kidney disease, stage 5, kidney failure ([address]     Closed fracture of right shoulder girdle, with routine healing, subsequent encounter     DM (diabetes mellitus) type II controlled with renal manifestation ([address] 06/1993    Gastroparesis     GERD (gastroesophageal reflux disease)     Hemodialysis patient ([facility]     dialysis on tues, thur, and sat at [facility]    Hypertension     Hypothyroid     Nasal congestion     recent    Neuropathy     Noncompliance with medications     Palliative care patient 10/08/2019    Restless leg syndrome        Past Surgical History:  Past Surgical History:   Procedure Laterality Date    BREAST SURGERY Left 2008    infected milk gland removed    BREAST SURGERY Right 5/25/2021    WEDGE EXCISION RIGHT BREAST DUCT WITH LACRIMAL DUCT PROBE, PEC BLOCK performed by Daniele George MD at [address], LAPAROSCOPIC      2008    COLONOSCOPY Left 9/17/2020    Dr Claudia Scott hepatic flexure-Severe tortuosity with severe spasming, 1 yr recall    DIALYSIS FISTULA CREATION Left 1/25/2019    REVISION LEFT UPPER EXTREMITY AV ACCESS WITH LEFT BRACHIAL ARTERY TO LEFT AXILLARY VEIN WITH  INTERPOSITIONAL ARTEGRAFT   performed by Elier Robertson MD at [address]  2012    [address] Right 1/25/2019    INSERTION OF RIGHT INTERNAL JUGULAR HEMODIALYSIS CATHETER performed by Elier Robertson MD at [facility]  08/17/2018    1.  Moderately increased stainable iron identified by special stain in Kupffer cells and occasional hepatocytes. Negative for evidence of significant portal or lobular inflammation. Negative for evidence of significant fibrosis    NV COLONOSCOPY W/BIOPSY SINGLE/MULTIPLE N/A 10/20/2017    Dr Penni Dandy prep-Tubular AP (-) dysplasia x 2--3 yr recall    NV EGD TRANSORAL BIOPSY SINGLE/MULTIPLE N/A 12/27/2016    Dr Cheryle Rizvi EGD TRANSORAL BIOPSY SINGLE/MULTIPLE N/A 10/20/2017    Dr Melony Matthews (-)   [address] VASCULAR SURGERY Left 2016    fistula by Dr Graeme Newton at [address]  10/02/2017    [facility]. Left upper fistulograms/venograms, balloon angioplasty cephalic vein arch 63W30 conquest.    VASCULAR SURGERY  08/2018    FISTULOGRAM    VASCULAR SURGERY  10/29/2018    [facility]. Left upper fistulograms/venograms    VASCULAR SURGERY  12/19/2018    [facility]. Arch aortogram,left upper arteriograms.  VASCULAR SURGERY  03/06/2019    [facility]. Removal of tunneled dialyis catheter right internal jugular vein.  VASCULAR SURGERY  08/28/2019    [facility]. Left upper extremity fistulogram including venography to the superior vena cava. Balloon angioplasty left subclavian vein with 10mm x 40mm conquest balloon. Balloon angioplasty mid/proximal upper arm cephalic vein with 78TA x 40mm conquest balloon. Venograms after balloon angioplasty. Stent left mid/proximal upper arm cephalic vein stenosis fluency 10mm x 60mm self-expanding covered stent. Balloon     VASCULAR SURGERY  2019    cont angioplasty stent with 10mm x 40mm conquest balloon. Completion venograms left upper extremity.        Family History  Family History   Problem Relation Age of Onset    Colon Cancer Mother          at 63    Colon Polyps Mother     Lung Cancer Father          at 79    Colon Polyps Father     Stomach Cancer Sister     Breast Cancer Sister 27    Depression Daughter 25        committed suicide    Liver Cancer Neg Hx     Liver Disease Neg Hx     Esophageal Cancer Neg Hx     Rectal Cancer Neg Hx        Social History  Social History     Socioeconomic History    Marital status: Single     Spouse name: Not on file    Number of children: 2    Years of education: Not on file    Highest education level: Not on file   Occupational History    Not on file   Tobacco Use    Smoking status: Current Every Day Smoker     Packs/day: 0.50     Years: 33.00     Pack years: 16.50     Types: Cigarettes    Smokeless tobacco: Never Used    Tobacco comment: NOW at 1/4 PD, started at age 24 and has averaged 2 PPD   Vaping Use    Vaping Use: Never used   Substance and Sexual Activity    Alcohol use: No    Drug use: Not Currently     Types: Marijuana Dein Piper)    Sexual activity: Not Currently     Partners: Male   Other Topics Concern    Not on file   Social History Narrative    Not on file     Social Determinants of Health     Financial Resource Strain:     Difficulty of Paying Living Expenses: Not on file   Food Insecurity:     Worried About Running Out of Food in the Last Year: Not on file    Kendy of Food in the Last Year: Not on file   Transportation Needs:     Lack of Transportation (Medical): Not on file    Lack of Transportation (Non-Medical): Not on file   Physical Activity:     Days of Exercise per Week: Not on file    Minutes of Exercise per Session: Not on file   Stress:     Feeling of Stress : Not on file   Social Connections:     Frequency of Communication with Friends and Family: Not on file    Frequency of Social Gatherings with Friends and Family: Not on file    Attends Jain Services: Not on file    Active Member of [address] [place] [address] [employer] or Organizations: Not on file    Attends Club or Organization Meetings: Not on file    Marital Status: Not on file   Intimate Partner Violence:     Fear of Current or Ex-Partner: Not on file    Emotionally Abused: Not on file    Physically Abused: Not on file    Sexually Abused: Not on file   Housing Stability:     Unable to Pay for Housing in the Last Year: Not on file    Number of [place] in the Last Year: Not on file    Unstable Housing in the Last Year: Not on file         Review of Systems:  History obtained from chart review and the patient  General ROS: No fever or chills  Respiratory ROS: No cough, shortness of breath, wheezing  Cardiovascular ROS: no chest pain or dyspnea on exertion  Gastrointestinal ROS: No abdominal pain or melena  Genito-Urinary ROS: No dysuria or hematuria  Musculoskeletal ROS: No joint pain or swelling         Objective:  Blood pressure (!) 190/80, pulse 67, temperature 99.3 °F (37.4 °C), temperature source Temporal, resp. rate 16, height 5' 6\" (1.676 m), weight 150 lb (68 kg), SpO2 95 %.     Intake/Output Summary (Last 24 hours) at 12/17/2021 1336  Last data filed at 12/17/2021 0947  Gross per 24 hour   Intake 1320 ml   Output 220 ml   Net 1100 ml     General: alert and oriented x3   Chest:  clear to auscultation bilaterally without respiratory distress  CVS: regular rate and rhythm  Abdominal: soft, nontender, normal bowel sounds  Extremities: no cyanosis or edema  Skin: warm and dry without rash    Labs:  BMP:   Recent Labs     12/14/21  1715 12/14/21  1715 12/15/21  0134 12/16/21  0908 12/17/21  0157   *   < > 128* 133* 132*   K 3.0*   < > 4.5 3.5 5.0   CL 89*   < > 89* 95* 94*   CO2 26   < > 23 26 25   PHOS 2.2*  --   --   --   --    BUN 30*   < > 26* 24* 23*   CREATININE 3.6*   < > 4.0* 3.6* 4.3*   CALCIUM 8.5*   < > 8.3* 8.4* 8.6    < > = values in this interval not displayed. CBC:   Recent Labs     12/15/21  0134 12/16/21  0909 12/17/21 0157   WBC 4.8 4.2* 4.4*   HGB 11.5* 10.7* 10.4*   HCT 34.6* 33.6* 33.8*   MCV 85.9 89.1 92.9   * 122* 119*     LIVER PROFILE:   Recent Labs     12/15/21  0134 12/16/21  0908 12/17/21  0157   AST 14 13 16   ALT 8 6 8   BILITOT 0.5 0.3 0.4   ALKPHOS 105* 86 90     PT/INR: No results for input(s): PROTIME, INR in the last 72 hours. APTT: No results for input(s): APTT in the last 72 hours. BNP:  No results for input(s): BNP in the last 72 hours. Ionized Calcium:No results for input(s): IONCA in the last 72 hours. Magnesium:  Recent Labs     12/15/21  0134 12/16/21  0908 12/17/21  0157   MG 2.1 2.1 2.2     Phosphorus:  Recent Labs     12/14/21 1715   PHOS 2.2*     HgbA1C:   No results for input(s): LABA1C in the last 72 hours. Hepatic:   Recent Labs     12/15/21  0134 12/16/21  0908 12/17/21  0157   ALKPHOS 105* 86 90   ALT 8 6 8   AST 14 13 16   PROT 5.9* 6.2* 5.9*   BILITOT 0.5 0.3 0.4   LABALBU 3.4* 3.3* 3.5     Lactic Acid:   No results for input(s): LACTA in the last 72 hours. Troponin: No results for input(s): CKTOTAL, CKMB, TROPONINT in the last 72 hours. ABGs: No results for input(s): PH, PCO2, PO2, HCO3, O2SAT in the last 72 hours. CRP:  No results for input(s): CRP in the last 72 hours. Sed Rate:  No results for input(s): SEDRATE in the last 72 hours. Cultures:   No results for input(s): CULTURE in the last 72 hours.     Radiology reports as per the Radiologist  Radiology: XR CHEST PORTABLE    Result Date: 12/14/2021  Examination. XR CHEST PORTABLE 12/14/2021 7:59 AM History: Fever. A frontal portable upright view of the chest is obtained and compared with the previous study dated 11/18/2021. There is moderate improvement in pulmonary vascular congestion. There is a small right basal pleural effusion. There is no pneumothorax. There is persistent moderate cardiomegaly. There is a left subclavian vascular stent in place similar to the previous study. There is moderate diffuse osteopenia. No acute bony abnormality. 1. Moderate improvement in pulmonary vascular congestion since the previous study. 2. A small right basal pleural effusion. 3. A persistent moderate cardiomegaly. 4. Moderate diffuse osteopenia. Signed by Dr Stalni Duran     -End stage renal disease  -Type 2 diabetes with renal disease-severe hyperglycemia  -Hypertensive crisis  -Pseudohyponatremia  -Hyperkalemia  -Metabolic acidosis  -Nausea and vomiting  -Diarrhea  -Hypophosphatemia    Plan:  Continue oral BP meds. Dialysis is due next on December 18. Follow-up labs. Minoxidil was added.

## 2021-12-17 NOTE — CARE COORDINATION
Date / Time of Evaluation:   12/17/2021    3:24 PM  Assessment Completed by:   Asad Loyola RN      Patient Name:   Lashonda Malhotra  MRN:   [id number]  [place]:   1970    Patient Admission Status:   Inpatient [973]    Patient Contact Information:    [address] [place] [address] [address]  [phone] (home)   Telephone Information:   Mobile [phone]     Above information verified? [x]   Yes  []   No    (Best Practice:   Have patient/caregiver verify above address and phone number by stating out loud their current address and reachable phone number. Initial Assessment Completed at bedside with:      [x]   Patient  []   Family/Caregiver/Guardian   []   Other:      Current PCP:    ROMINA Watts    PCP verified? [x]   Yes  []   No    Emergency Contacts:    Extended Emergency Contact Information  Primary Emergency Contact: Yobani Leiva   [address] Phone: [phone]  Mobile Phone: [phone]  Relation: Other  Secondary Emergency Contact: Bhumika Ortez  Address: [address]. [address] Phone: [phone]  Mobile Phone: [phone]  Relation: Brother/Sister    Advance Directives:    Does Ms. Daniel Larkin have an advance directive in her electronic medical record? [x]   Yes  []   No    Code Status:   Full Code      Have you been vaccinated for COVID-19 (SARS-CoV-2)? []   Yes  [x]   No                   If so, when?     Which :         []   Pfizer-BioNTech  []   Moderna  []   [employer]  []   Other:       Do you have any of the following unmet social needs that would keep you from returning home safely:    [x]   Yes  []   No                    Unmet Social Needs:           []   Living Situation/Housing  []   Food  []   Stroke Education   []   Utilities  []   Personal Safety  []   Financial Strain  []   Employment  []   Mental Health  []   Substance Abuse  []   Transportation Barriers    Additional Unmet Social Needs Notes:   Pt does live alone      Financial:    Payor: Anjelica Generous / Plan: Anjel[employer] Generous  / Product Type: *No Product type* /     Pre-Cert required for SNF:     [x]   Yes  []   No    Have Long Term Care Insurance:      []   Yes  [x]   No      Pharmacy:    [id number] - [address] [phone] - F [phone]  [address] [address]  Phone: [phone] Fax: [facility], [facility] [address]  [address]  [address] [place] [address]  Phone: [phone] Fax: [phone]    Potential assistance purchasing medications? []   Yes  [x]   No      ADLS:   Pt states, she is independent at home, she uses [employer] for transportation, and both sister and ex spouse are support to her    Support System:   Sister/ex spouse      Current Home Environment:       Steps:       [x]   Yes  []   No    If yes, how many?  2 w/railig    Plans to RETURN to current housing:     [x]   Yes  []   No    Barriers to RETURNING to current housing:  None stated    Currently ACTIVE with Home Health CARE:      []   Yes  [x]   No    DME Provider:   [place] [facility]      Had HOME OXYGEN prior to admission:      []   Yes  [x]   No      Active with HD/PD prior to admission:             [x]   Yes  T/TH/SA  []   No    Nephrologist:  Dr. Mohini Berry:  [place]      Transition Plan:  Pt is requesting SNF for rehab    Transportation PLAN for Discharge:  Private car    Factors facilitating achievement of predicted outcomes: medically stable    Barriers to discharge:  HTN, Hyperglycemia    Patient Deficits:    []   Yes   [x]   No    If yes:    []   Confusion/Memory  []   Visual  []   Motor/Sensory         []   Right arm         []   Right leg         []   Left arm         []   Left leg  []   Language/Speech         []   Aphasia         []   Dysarthria         []   Swallow    NIH Stroke Scale  Interval: Other (Comment)  Level of Consciousness (1a. ): Alert  LOC Questions (1b. ): Answers both correctly  LOC Commands (1c. ): Performs both tasks correctly  Best Gaze (2. ): Normal  Visual (3. ): No visual loss  Facial Palsy (4. ): Normal symmetrical movement  Motor Arm, Left (5a. ): No drift  Motor Arm, Right (5b. ): No drift  Motor Leg, Left (6a. ): No drift  Motor Leg, Right (6b. ): No drift  Limb Ataxia (7. ): Absent  Sensory (8. ): Normal  Best Language (9. ): No aphasia  Dysarthria (10. ): Normal  Extinction and Inattention (11): No abnormality  Total: 0    [place] Coma Scale  Eye Opening: Spontaneous  Best Verbal Response: Oriented  Best Motor Response: Obeys commands  Y[place]uf Coma Scale Score: 15    Additional CM Notes: Pt states, she lives alone and use [employer] for transportation. Pt is requesting to go to Rehab at discharge. She has would like referrals to [address] [place] [address] and/or her family were provided with choice of provider:    [x]   Yes   []   No      Lorenzo Bo RN  [address]  Phone:   [phone]       Fax:  [phone]  Electronically signed by Lorenzo Bo RN on 12/17/2021 at 3:33 PM

## 2021-12-18 LAB
ALBUMIN SERPL-MCNC: 3.3 G/DL (ref 3.5–5.2)
ALP BLD-CCNC: 93 U/L (ref 35–104)
ALT SERPL-CCNC: 8 U/L (ref 5–33)
ANION GAP SERPL CALCULATED.3IONS-SCNC: 15 MMOL/L (ref 7–19)
ANION GAP SERPL CALCULATED.3IONS-SCNC: 17 MMOL/L (ref 7–19)
AST SERPL-CCNC: 14 U/L (ref 5–32)
BASOPHILS ABSOLUTE: 0.1 K/UL (ref 0–0.2)
BASOPHILS RELATIVE PERCENT: 2.1 % (ref 0–1)
BILIRUB SERPL-MCNC: 0.5 MG/DL (ref 0.2–1.2)
BUN BLDV-MCNC: 14 MG/DL (ref 6–20)
BUN BLDV-MCNC: 43 MG/DL (ref 6–20)
CALCIUM SERPL-MCNC: 8.7 MG/DL (ref 8.6–10)
CALCIUM SERPL-MCNC: 8.7 MG/DL (ref 8.6–10)
CHLORIDE BLD-SCNC: 90 MMOL/L (ref 98–111)
CHLORIDE BLD-SCNC: 95 MMOL/L (ref 98–111)
CO2: 22 MMOL/L (ref 22–29)
CO2: 25 MMOL/L (ref 22–29)
CREAT SERPL-MCNC: 2.5 MG/DL (ref 0.5–0.9)
CREAT SERPL-MCNC: 6 MG/DL (ref 0.5–0.9)
EOSINOPHILS ABSOLUTE: 0.3 K/UL (ref 0–0.6)
EOSINOPHILS RELATIVE PERCENT: 5.3 % (ref 0–5)
GFR AFRICAN AMERICAN: 24
GFR AFRICAN AMERICAN: 9
GFR NON-AFRICAN AMERICAN: 20
GFR NON-AFRICAN AMERICAN: 7
GLUCOSE BLD-MCNC: 108 MG/DL (ref 70–99)
GLUCOSE BLD-MCNC: 186 MG/DL (ref 70–99)
GLUCOSE BLD-MCNC: 219 MG/DL (ref 74–109)
GLUCOSE BLD-MCNC: 249 MG/DL (ref 70–99)
GLUCOSE BLD-MCNC: 294 MG/DL (ref 70–99)
GLUCOSE BLD-MCNC: 319 MG/DL (ref 74–109)
GLUCOSE BLD-MCNC: 331 MG/DL (ref 70–99)
HCT VFR BLD CALC: 36.7 % (ref 37–47)
HEMOGLOBIN: 11.2 G/DL (ref 12–16)
IMMATURE GRANULOCYTES #: 0 K/UL
LYMPHOCYTES ABSOLUTE: 1.4 K/UL (ref 1.1–4.5)
LYMPHOCYTES RELATIVE PERCENT: 30.3 % (ref 20–40)
MAGNESIUM: 2.5 MG/DL (ref 1.6–2.6)
MCH RBC QN AUTO: 28 PG (ref 27–31)
MCHC RBC AUTO-ENTMCNC: 30.5 G/DL (ref 33–37)
MCV RBC AUTO: 91.8 FL (ref 81–99)
MONOCYTES ABSOLUTE: 0.5 K/UL (ref 0–0.9)
MONOCYTES RELATIVE PERCENT: 9.5 % (ref 0–10)
NEUTROPHILS ABSOLUTE: 2.5 K/UL (ref 1.5–7.5)
NEUTROPHILS RELATIVE PERCENT: 52.6 % (ref 50–65)
PDW BLD-RTO: 15.2 % (ref 11.5–14.5)
PERFORMED ON: ABNORMAL
PLATELET # BLD: 131 K/UL (ref 130–400)
PMV BLD AUTO: 10.4 FL (ref 9.4–12.3)
POTASSIUM SERPL-SCNC: 3.6 MMOL/L (ref 3.5–5)
POTASSIUM SERPL-SCNC: 6.2 MMOL/L (ref 3.5–5)
RBC # BLD: 4 M/UL (ref 4.2–5.4)
SARS-COV-2, NAAT: NOT DETECTED
SODIUM BLD-SCNC: 129 MMOL/L (ref 136–145)
SODIUM BLD-SCNC: 135 MMOL/L (ref 136–145)
TOTAL PROTEIN: 5.8 G/DL (ref 6.6–8.7)
WBC # BLD: 4.7 K/UL (ref 4.8–10.8)

## 2021-12-18 PROCEDURE — 87635 SARS-COV-2 COVID-19 AMP PRB: CPT

## 2021-12-18 PROCEDURE — 1210000000 HC MED SURG R&B

## 2021-12-18 PROCEDURE — 82947 ASSAY GLUCOSE BLOOD QUANT: CPT

## 2021-12-18 PROCEDURE — 6370000000 HC RX 637 (ALT 250 FOR IP): Performed by: INTERNAL MEDICINE

## 2021-12-18 PROCEDURE — 85025 COMPLETE CBC W/AUTO DIFF WBC: CPT

## 2021-12-18 PROCEDURE — 6360000002 HC RX W HCPCS: Performed by: INTERNAL MEDICINE

## 2021-12-18 PROCEDURE — 6370000000 HC RX 637 (ALT 250 FOR IP): Performed by: HOSPITALIST

## 2021-12-18 PROCEDURE — 6370000000 HC RX 637 (ALT 250 FOR IP): Performed by: NURSE PRACTITIONER

## 2021-12-18 PROCEDURE — 6370000000 HC RX 637 (ALT 250 FOR IP): Performed by: STUDENT IN AN ORGANIZED HEALTH CARE EDUCATION/TRAINING PROGRAM

## 2021-12-18 PROCEDURE — 83735 ASSAY OF MAGNESIUM: CPT

## 2021-12-18 PROCEDURE — 8010000000 HC HEMODIALYSIS ACUTE INPT

## 2021-12-18 PROCEDURE — 80053 COMPREHEN METABOLIC PANEL: CPT

## 2021-12-18 PROCEDURE — 36415 COLL VENOUS BLD VENIPUNCTURE: CPT

## 2021-12-18 RX ORDER — NICOTINE 21 MG/24HR
1 PATCH, TRANSDERMAL 24 HOURS TRANSDERMAL DAILY
Status: DISCONTINUED | OUTPATIENT
Start: 2021-12-18 | End: 2021-12-22 | Stop reason: HOSPADM

## 2021-12-18 RX ORDER — MINOXIDIL 2.5 MG/1
2.5 TABLET ORAL DAILY
Qty: 30 TABLET | Refills: 3 | Status: ON HOLD | OUTPATIENT
Start: 2021-12-19 | End: 2022-01-02

## 2021-12-18 RX ADMIN — ISOSORBIDE MONONITRATE 120 MG: 60 TABLET, EXTENDED RELEASE ORAL at 12:41

## 2021-12-18 RX ADMIN — HYDROCODONE BITARTRATE AND ACETAMINOPHEN 1 TABLET: 5; 325 TABLET ORAL at 22:01

## 2021-12-18 RX ADMIN — ROPINIROLE HYDROCHLORIDE 4 MG: 4 TABLET, FILM COATED ORAL at 22:01

## 2021-12-18 RX ADMIN — SENNOSIDES 8.6 MG: 8.6 TABLET, FILM COATED ORAL at 12:42

## 2021-12-18 RX ADMIN — MINOXIDIL 2.5 MG: 2.5 TABLET ORAL at 12:41

## 2021-12-18 RX ADMIN — ONDANSETRON 4 MG: 2 INJECTION INTRAMUSCULAR; INTRAVENOUS at 16:03

## 2021-12-18 RX ADMIN — DULOXETINE 60 MG: 60 CAPSULE, DELAYED RELEASE ORAL at 12:41

## 2021-12-18 RX ADMIN — LEVOTHYROXINE SODIUM 150 MCG: 75 TABLET ORAL at 13:53

## 2021-12-18 RX ADMIN — LISINOPRIL 20 MG: 10 TABLET ORAL at 12:42

## 2021-12-18 RX ADMIN — LISINOPRIL 20 MG: 10 TABLET ORAL at 22:01

## 2021-12-18 RX ADMIN — INSULIN LISPRO 1 UNITS: 100 INJECTION, SOLUTION INTRAVENOUS; SUBCUTANEOUS at 12:46

## 2021-12-18 RX ADMIN — HYDRALAZINE HYDROCHLORIDE 100 MG: 50 TABLET, FILM COATED ORAL at 22:03

## 2021-12-18 RX ADMIN — ONDANSETRON 4 MG: 2 INJECTION INTRAMUSCULAR; INTRAVENOUS at 03:34

## 2021-12-18 RX ADMIN — ATORVASTATIN CALCIUM 20 MG: 40 TABLET, FILM COATED ORAL at 12:41

## 2021-12-18 RX ADMIN — HYDRALAZINE HYDROCHLORIDE 100 MG: 50 TABLET, FILM COATED ORAL at 13:53

## 2021-12-18 RX ADMIN — INSULIN LISPRO 10 UNITS: 100 INJECTION, SOLUTION INTRAVENOUS; SUBCUTANEOUS at 12:42

## 2021-12-18 RX ADMIN — HYDROCHLOROTHIAZIDE 50 MG: 25 TABLET ORAL at 12:41

## 2021-12-18 RX ADMIN — SODIUM ZIRCONIUM CYCLOSILICATE 10 G: 10 POWDER, FOR SUSPENSION ORAL at 12:42

## 2021-12-18 RX ADMIN — NIFEDIPINE 90 MG: 30 TABLET, EXTENDED RELEASE ORAL at 13:53

## 2021-12-18 ASSESSMENT — PAIN DESCRIPTION - PAIN TYPE: TYPE: ACUTE PAIN

## 2021-12-18 ASSESSMENT — PAIN SCALES - GENERAL
PAINLEVEL_OUTOF10: 7
PAINLEVEL_OUTOF10: 0

## 2021-12-18 ASSESSMENT — PAIN DESCRIPTION - ONSET: ONSET: ON-GOING

## 2021-12-18 ASSESSMENT — PAIN DESCRIPTION - LOCATION: LOCATION: BACK

## 2021-12-18 ASSESSMENT — PAIN DESCRIPTION - FREQUENCY: FREQUENCY: INTERMITTENT

## 2021-12-18 ASSESSMENT — PAIN DESCRIPTION - DESCRIPTORS: DESCRIPTORS: ACHING

## 2021-12-18 ASSESSMENT — PAIN - FUNCTIONAL ASSESSMENT: PAIN_FUNCTIONAL_ASSESSMENT: PREVENTS OR INTERFERES SOME ACTIVE ACTIVITIES AND ADLS

## 2021-12-18 ASSESSMENT — PAIN DESCRIPTION - PROGRESSION: CLINICAL_PROGRESSION: GRADUALLY IMPROVING

## 2021-12-18 ASSESSMENT — PAIN DESCRIPTION - ORIENTATION: ORIENTATION: LOWER

## 2021-12-18 NOTE — CARE COORDINATION
Pt has been accepted to [facility]. Will need negative covid test.     [facility] (formerly [address] [place] [address])   [phone] P  [phone] F  [phone] Referral fax number for   Electronically signed by Tarsha Perez on 12/18/2021 at 1:00 PM      **Received phone call from [facility] admissions. Stated pt is no longer able to come today. Stated it will probably be Monday. Informed nurse of this. **Plans changed multiple times. Pt was going to dc home with the help of her boyfriend until she contacted the facility on Monday. Facility stated it wasn't the best option, however, it could be done. After further discussing this with the pt and the facility, the pt will stay here until her precert is finished to guarantee a spot at facility.

## 2021-12-18 NOTE — PROGRESS NOTES
Nephrology ([facility]) Progress Note    Patient:  Barbara Greenwood  YOB: 1970  Date of Service: 12/18/2021  MRN: [id number]   Acct: [de-identified]   Primary Care Physician: ROMINA Degroot  Advance Directive: Full Code  Admit Date: 12/14/2021       Hospital Day: 4  Referring Provider: Jose Luis Barrientos MD    Patient Seen, Chart, Consults, Notes, Labs, Radiology studies reviewed. Subjective:  Patient is a 48-year-old woman with a past medical history of hypertension, type 2 diabetes and end-stage renal disease.  She has been on chronic maintenance hemodialysis at [facility] on T,T,S.  She was in her regular state of health until recently when she started having nausea recurrent vomiting and diarrhea.  Patient was unable to keep her medications down.  She presented to the emergency room and she was found to have a hypertensive crisis with severe hyperglycemia (>1000).   She was subsequently admitted to ICU.  Renal service was consulted to manage her ESRD.  Patient is s/p dialysis on 12/14. Today, she is on the medical floor but still running high BPs. She was seen and examined on dialysis. Her BPs are now better since minoxidil was added.         Dialysis   Pt was seen on RRT  Modality: Hemodialysis  Access: Arterial Venous Fistula  Location: Left upper  QB: 600  QD: 800  UF: 3.5 liters    Allergies:  Penicillins, Lamisil advanced [terbinafine], Stadol [butorphanol], and Reglan [metoclopramide]    Medicines:  Current Facility-Administered Medications   Medication Dose Route Frequency Provider Last Rate Last Admin    sodium zirconium cyclosilicate (LOKELMA) oral suspension 10 g  10 g Oral Once Aureliano Leblanc MD        minoxidil (LONITEN) tablet 2.5 mg  2.5 mg Oral Daily ROMINA Mazariegos - CNP   2.5 mg at 12/17/21 1144    ondansetron (ZOFRAN) injection 4 mg  4 mg IntraVENous Q6H PRN Jose Luis Barrientos MD   4 mg at 12/18/21 0334    glucose (GLUTOSE) 40 % oral gel 15 g  15 g Oral PRN Sae Melgar MD   15 g at 12/17/21 1654    dextrose 50 % IV solution  12.5 g IntraVENous PRN Sae Melgar MD        glucagon (rDNA) injection 1 mg  1 mg IntraMUSCular PRN Sae Melgar MD        dextrose 5 % solution  100 mL/hr IntraVENous PRN Sae Melgar MD        potassium chloride 10 mEq/100 mL IVPB (Peripheral Line)  10 mEq IntraVENous PRN Jonnie Cortez  mL/hr at 12/14/21 2207 10 mEq at 12/14/21 2207    magnesium sulfate 1000 mg in dextrose 5% 100 mL IVPB  1,000 mg IntraVENous PRN Jonnie Cortez MD        sodium phosphate 10 mmol in dextrose 5 % 250 mL IVPB  10 mmol IntraVENous PRN Jonnie Cortez MD        Or    sodium phosphate 15 mmol in dextrose 5 % 250 mL IVPB  15 mmol IntraVENous PRN Jonnie Cortez MD   Stopped at 12/15/21 0307    Or    sodium phosphate 20 mmol in dextrose 5 % 500 mL IVPB  20 mmol IntraVENous PRN Jonnie Cortez MD        dextrose 5 % and 0.45 % sodium chloride infusion   IntraVENous Continuous PRN Jonnie Cortez MD   Stopped at 12/14/21 1836    cloNIDine (CATAPRES) 0.3 MG/24HR 1 patch  1 patch TransDERmal Weekly Jonnie Cortez MD        hydrALAZINE (APRESOLINE) injection 10 mg  10 mg IntraVENous Q6H PRN Jonnie Cortez MD   10 mg at 12/17/21 1730    labetalol (NORMODYNE;TRANDATE) injection 20 mg  20 mg IntraVENous Q4H PRN Jonnie Cortez MD        DULoxetine (CYMBALTA) extended release capsule 60 mg  60 mg Oral Daily Jonnie Cortez MD   60 mg at 12/17/21 7025    vitamin D (ERGOCALCIFEROL) capsule 50,000 Units  50,000 Units Oral Q14 Days Jonnie Cortez MD        hydrALAZINE (APRESOLINE) tablet 100 mg  100 mg Oral 3 times per day Jonnie Cortez MD   100 mg at 12/17/21 2225    hydroCHLOROthiazide (HYDRODIURIL) tablet 50 mg  50 mg Oral Daily Jonnie Cortez MD   50 mg at 12/17/21 0909    HYDROcodone-acetaminophen (NORCO) 5-325 MG per tablet 1 tablet  1 tablet Oral Q6H PRN Jonnie Cortez MD   1 tablet at 12/17/21 5285    isosorbide mononitrate (IMDUR) extended release tablet 120 mg  120 mg Oral Daily Wilberto Marcial MD   120 mg at 12/17/21 4998    levothyroxine (SYNTHROID) tablet 150 mcg  150 mcg Oral Daily Wilberto Marcial MD   150 mcg at 12/17/21 0912    lisinopril (PRINIVIL;ZESTRIL) tablet 20 mg  20 mg Oral BID Wilberto Marcial MD   20 mg at 12/17/21 2225    metoprolol succinate (TOPROL XL) extended release tablet 100 mg  100 mg Oral QAM AC Wilberto Marcial MD   100 mg at 12/17/21 0735    NIFEdipine (PROCARDIA XL) extended release tablet 90 mg  90 mg Oral Daily Wilberto Marcial MD   90 mg at 12/17/21 9684    rOPINIRole (REQUIP) tablet 4 mg  4 mg Oral Nightly Wilberto Marcial MD   4 mg at 12/17/21 2225    senna (SENOKOT) tablet 8.6 mg  1 tablet Oral Daily Wilberto Marcial MD   8.6 mg at 12/17/21 0910    atorvastatin (LIPITOR) tablet 20 mg  20 mg Oral Daily Wilberto Marcial MD   20 mg at 12/17/21 0909    albuterol (PROVENTIL) nebulizer solution 2.5 mg  2.5 mg Nebulization Q4H PRN Wilberto Marcial MD        [Held by provider] insulin lispro (HUMALOG) injection vial 10 Units  10 Units SubCUTAneous TID GIANNA Marcial MD        insulin lispro (HUMALOG) injection vial 10 Units  10 Units SubCUTAneous TID [facility] Wilberto Marcial MD   10 Units at 12/17/21 1142    insulin glargine (LANTUS) injection vial 40 Units  40 Units SubCUTAneous Nightly Wilberto Marcial MD   40 Units at 12/15/21 0101    insulin lispro (HUMALOG) injection vial 0-6 Units  0-6 Units SubCUTAneous TID GIANNA Marcial MD   2 Units at 12/17/21 1137    insulin lispro (HUMALOG) injection vial 0-3 Units  0-3 Units SubCUTAneous Nightly Wilberto Marcial MD           Past Medical History:  Past Medical History:   Diagnosis Date    Arthritis     [facility] kidney disease, stage 5, kidney failure [address]     Closed fracture of right shoulder girdle, with routine healing, subsequent encounter     DM (diabetes mellitus) type II controlled with renal manifestation ([facility] 06/1993    Gastroparesis     GERD (gastroesophageal reflux disease)     Hemodialysis patient ([address]     dialysis on tues, thur, and sat at [facility]    Hypertension     Hypothyroid     Nasal congestion     recent    Neuropathy     Noncompliance with medications     Palliative care patient 10/08/2019    Restless leg syndrome        Past Surgical History:  Past Surgical History:   Procedure Laterality Date    BREAST SURGERY Left 2008    infected milk gland removed    BREAST SURGERY Right 5/25/2021    WEDGE EXCISION RIGHT BREAST DUCT WITH LACRIMAL DUCT PROBE, PEC BLOCK performed by Gabriel Wolfe MD at [address], LAPAROSCOPIC      2008    COLONOSCOPY Left 9/17/2020    Dr Obdulio Whaley hepatic flexure-Severe tortuosity with severe spasming, 1 yr recall    DIALYSIS FISTULA CREATION Left 1/25/2019    REVISION LEFT UPPER EXTREMITY AV ACCESS WITH LEFT BRACHIAL ARTERY TO LEFT AXILLARY VEIN WITH  INTERPOSITIONAL ARTEGRAFT   performed by Nargis Watson MD at [address]  2012    [address] Right 1/25/2019    INSERTION OF RIGHT INTERNAL JUGULAR HEMODIALYSIS CATHETER performed by Nargis Watson MD at [facility]  08/17/2018    1.  Moderately increased stainable iron identified by special stain in Kupffer cells and occasional hepatocytes. Negative for evidence of significant portal or lobular inflammation. Negative for evidence of significant fibrosis    CA COLONOSCOPY W/BIOPSY SINGLE/MULTIPLE N/A 10/20/2017    Dr Thu Ibanez prep-Tubular AP (-) dysplasia x 2--3 yr recall    CA EGD TRANSORAL BIOPSY SINGLE/MULTIPLE N/A 12/27/2016    Dr Alicia Goss EGD TRANSORAL BIOPSY SINGLE/MULTIPLE N/A 10/20/2017    Dr Napoleon Vick (-)   [facility] VASCULAR SURGERY Left 2016    fistula by Dr Silvio Barahona at [address]  10/02/2017    [facility]. Left upper fistulograms/venograms, balloon angioplasty cephalic vein arch 66L02 conquest.    VASCULAR SURGERY  2018    FISTULOGRAM    VASCULAR SURGERY  10/29/2018    [facility]. Left upper fistulograms/venograms    VASCULAR SURGERY  2018    [facility]. Arch aortogram,left upper arteriograms.  VASCULAR SURGERY  2019    [facility]. Removal of tunneled dialyis catheter right internal jugular vein.  VASCULAR SURGERY  2019    [facility]. Left upper extremity fistulogram including venography to the superior vena cava. Balloon angioplasty left subclavian vein with 10mm x 40mm conquest balloon. Balloon angioplasty mid/proximal upper arm cephalic vein with 99GK x 40mm conquest balloon. Venograms after balloon angioplasty. Stent left mid/proximal upper arm cephalic vein stenosis fluency 10mm x 60mm self-expanding covered stent. Balloon     VASCULAR SURGERY  2019    cont angioplasty stent with 10mm x 40mm conquest balloon. Completion venograms left upper extremity.        Family History  Family History   Problem Relation Age of Onset    Colon Cancer Mother          at 63    Colon Polyps Mother     Lung Cancer Father          at 79    Colon Polyps Father     Stomach Cancer Sister     Breast Cancer Sister 27    Depression Daughter 25        committed suicide    Liver Cancer Neg Hx     Liver Disease Neg Hx     Esophageal Cancer Neg Hx     Rectal Cancer Neg Hx        Social History  Social History     Socioeconomic History    Marital status: Single     Spouse name: Not on file    Number of children: 2    Years of education: Not on file    Highest education level: Not on file   Occupational History    Not on file   Tobacco Use    Smoking status: Current Every Day Smoker     Packs/day: 0.50     Years: 33.00     Pack years: 16.50     Types: Cigarettes    Smokeless tobacco: Never Used    Tobacco comment: NOW at 1/4 PD, started at age 24 and has averaged 2 PPD   Vaping Use    Vaping Use: Never used   Substance and Sexual Activity    Alcohol use: No    Drug use: Not Currently     Types: Marijuana Dolph Scott)    Sexual activity: Not Currently     Partners: Male   Other Topics Concern    Not on file   Social History Narrative    Not on file     Social Determinants of Health     Financial Resource Strain:     Difficulty of Paying Living Expenses: Not on file   Food Insecurity:     Worried About Running Out of Food in the Last Year: Not on file    Kendy of Food in the Last Year: Not on file   Transportation Needs:     Lack of Transportation (Medical): Not on file    Lack of Transportation (Non-Medical): Not on file   Physical Activity:     Days of Exercise per Week: Not on file    Minutes of Exercise per Session: Not on file   Stress:     Feeling of Stress : Not on file   Social Connections:     Frequency of Communication with Friends and Family: Not on file    Frequency of Social Gatherings with Friends and Family: Not on file    Attends Confucianist Services: Not on file    Active Member of [address] [place] [address] [employer] or Organizations: Not on file    Attends Club or Organization Meetings: Not on file    Marital Status: Not on file   Intimate Partner Violence:     Fear of Current or Ex-Partner: Not on file    Emotionally Abused: Not on file    Physically Abused: Not on file    Sexually Abused: Not on file   Housing Stability:     Unable to Pay for Housing in the Last Year: Not on file    Number of [place] in the Last Year: Not on file    Unstable Housing in the Last Year: Not on file         Review of Systems:  Reviewed with the patient at the bedside, 6 points reviewed and negative except as noted above.       Objective:  BP: 148/69    HR: 66  General: awake/alert   Chest:  clear to auscultation bilaterally without respiratory distress  CVS: regular rate and rhythm  Abdominal: soft, nontender, normal bowel sounds  Extremities: no cyanosis or edema  Skin: warm and dry without rash    Labs:  BMP:   Recent Labs     12/16/21  0908 12/17/21  0157 12/18/21  0409   * 132* 129*   K 3.5 5.0 6.2*   CL 95* 94* 90*   CO2 26 25 22   BUN 24* 23* 43*   CREATININE 3.6* 4.3* 6.0*   CALCIUM 8.4* 8.6 8.7     CBC:   Recent Labs     12/16/21  0909 12/17/21  0157 12/18/21  0409   WBC 4.2* 4.4* 4.7*   HGB 10.7* 10.4* 11.2*   HCT 33.6* 33.8* 36.7*   MCV 89.1 92.9 91.8   * 119* 131     LIVER PROFILE:   Recent Labs     12/16/21  0908 12/17/21  0157 12/18/21  0409   AST 13 16 14   ALT 6 8 8   BILITOT 0.3 0.4 0.5   ALKPHOS 86 90 93     PT/INR: No results for input(s): PROTIME, INR in the last 72 hours. APTT: No results for input(s): APTT in the last 72 hours. BNP:  No results for input(s): BNP in the last 72 hours. Ionized Calcium:No results for input(s): IONCA in the last 72 hours. Magnesium:  Recent Labs     12/16/21  0908 12/17/21  0157 12/18/21  0409   MG 2.1 2.2 2.5     Phosphorus:  No results for input(s): PHOS in the last 72 hours. HgbA1C:   No results for input(s): LABA1C in the last 72 hours. Hepatic:   Recent Labs     12/16/21  0908 12/17/21  0157 12/18/21  0409   ALKPHOS 86 90 93   ALT 6 8 8   AST 13 16 14   PROT 6.2* 5.9* 5.8*   BILITOT 0.3 0.4 0.5   LABALBU 3.3* 3.5 3.3*     Lactic Acid:   No results for input(s): LACTA in the last 72 hours. Troponin: No results for input(s): CKTOTAL, CKMB, TROPONINT in the last 72 hours. ABGs: No results for input(s): PH, PCO2, PO2, HCO3, O2SAT in the last 72 hours. CRP:  No results for input(s): CRP in the last 72 hours. Sed Rate:  No results for input(s): SEDRATE in the last 72 hours. Cultures:   No results for input(s): CULTURE in the last 72 hours. No results for input(s): BC, Giovany Morseck in the last 72 hours. No results for input(s): CXSURG in the last 72 hours. Radiology reports as per the Radiologist  Radiology: XR CHEST PORTABLE    Result Date: 12/14/2021  Examination. XR CHEST PORTABLE 12/14/2021 7:59 AM History: Fever. A frontal portable upright view of the chest is obtained and compared with the previous study dated 11/18/2021.  There is moderate improvement in pulmonary vascular congestion. There is a small right basal pleural effusion. There is no pneumothorax. There is persistent moderate cardiomegaly. There is a left subclavian vascular stent in place similar to the previous study. There is moderate diffuse osteopenia. No acute bony abnormality. 1. Moderate improvement in pulmonary vascular congestion since the previous study. 2. A small right basal pleural effusion. 3. A persistent moderate cardiomegaly. 4. Moderate diffuse osteopenia. Signed by Dr Jesus Hawk   -End stage renal disease  -Type 2 diabetes with renal disease-severe hyperglycemia  -Hypertensive crisis  -Pseudohyponatremia  -Hyperkalemia  -Metabolic acidosis  -Nausea and vomiting  -Diarrhea  -Hypophosphatemia    Plan:  Dialysis as above, follow up labs. Continue home BP meds. Agree with minoxidil. Lavelle Cushing for discharge from renal standpoint.      Chaz Madrid MD, MD  12/18/21  10:04 AM

## 2021-12-18 NOTE — DISCHARGE SUMMARY
Jillian Alfaro  :  1970  MRN:  [id number]    Admit date:  2021  Discharge date:  21    Discharging Physician:  Dr. Desiree Galicia Directive: Full Code    Consults: Carmen Johnson     Primary Care Physician:  ROMINA Trammell    Discharge Diagnoses:  Principal Problem:    Hyperosmolar hyperglycemic state (HHS) ([address]  Active Problems:    Type 2 diabetes mellitus with chronic kidney disease on chronic dialysis, with long-term current use of insulin (HCC)    ESRD (end stage renal disease) on dialysis [facility])    Hypertensive emergency    Generalized weakness  Resolved Problems:    * No resolved hospital problems. *      Portions of this note have been copied forward, however, changed to reflect the most current clinical status of this patient. Hospital Course:     Patient is a 26-year-old female with past medical history ESRD on dialysis, hypertension, diabetes, gastroparesis, GERD, and restless leg syndrome who presented to [facility] ED with complaints of nausea, vomiting, and diarrhea x3 days. Ms. Lucero Couch reported nonbloody vomiting and diarrhea, fatigue, and weakness. She reported she had not been able to keep her medications down. Work-up in the ED revealed elevated glucose and hypertensive urgency. Patient was admitted to the ICU for Cardene and insulin drips. Patient underwent IV fluid resuscitation. Medications were transitioned to oral and subcutaneous insulin and she was transferred out of the ICU on 12/15/2021. Oral medications were resumed and clonidine patch was resumed as well. Patient continued to have blood pressures with systolics over 989. Minoxidil initiated as last option available. Though elevated blood pressure much better controlled systolic pressures have been maintained between 141-179. Glucose being controlled with subcu insulin at this time. Hyperkalemia noted and treated.  Patient is now medically stable to discharge home with plans to admit to skilled nursing facility from home. Patient indicates having 24 hour care at home until admission into facility. Significant Diagnostic Studies:   XR CHEST PORTABLE    Result Date: 12/14/2021  Examination. XR CHEST PORTABLE 12/14/2021 7:59 AM History: Fever. A frontal portable upright view of the chest is obtained and compared with the previous study dated 11/18/2021. There is moderate improvement in pulmonary vascular congestion. There is a small right basal pleural effusion. There is no pneumothorax. There is persistent moderate cardiomegaly. There is a left subclavian vascular stent in place similar to the previous study. There is moderate diffuse osteopenia. No acute bony abnormality. 1. Moderate improvement in pulmonary vascular congestion since the previous study. 2. A small right basal pleural effusion. 3. A persistent moderate cardiomegaly. 4. Moderate diffuse osteopenia. Signed by Dr Cannon Carbon:   CBC:   Recent Labs     12/16/21  0909 12/17/21  0157 12/18/21  0409   WBC 4.2* 4.4* 4.7*   HGB 10.7* 10.4* 11.2*   * 119* 131     BMP:    Recent Labs     12/17/21  0157 12/18/21  0409 12/18/21  1220   * 129* 135*   K 5.0 6.2* 3.6   CL 94* 90* 95*   CO2 25 22 25   BUN 23* 43* 14   CREATININE 4.3* 6.0* 2.5*   GLUCOSE 224* 319* 219*     INR: No results for input(s): INR in the last 72 hours. Physical Exam:  Vital Signs: BP (!) 172/65   Pulse 66   Temp 97.1 °F (36.2 °C) (Temporal)   Resp 16   Ht 5' 6\" (1.676 m)   Wt 150 lb (68 kg)   SpO2 96%   BMI 24.21 kg/m²   Physical Exam  Vitals and nursing note reviewed. Constitutional:       Appearance: She is ill-appearing (Chronically). Comments: Appears much older than stated age   HENT:      Head: Normocephalic.       Mouth/Throat:      Mouth: Mucous membranes are moist.      Comments: Multiple missing and broken teeth  Eyes:      Conjunctiva/sclera: Conjunctivae normal.      Pupils: Pupils are equal, round, and reactive to light. Cardiovascular:      Rate and Rhythm: Normal rate and regular rhythm. Pulses: Normal pulses. Heart sounds: Normal heart sounds. Pulmonary:      Effort: Pulmonary effort is normal.      Breath sounds: Normal breath sounds. Abdominal:      General: Abdomen is flat. Bowel sounds are normal. There is no distension. Palpations: Abdomen is soft. Tenderness: There is no abdominal tenderness. There is no guarding. Musculoskeletal:         General: Normal range of motion. Cervical back: Normal range of motion and neck supple. Right lower leg: No edema. Left lower leg: No edema. Skin:     General: Skin is warm and dry. Capillary Refill: Capillary refill takes less than 2 seconds. Neurological:      General: No focal deficit present. Mental Status: She is alert and oriented to person, place, and time. Psychiatric:         Mood and Affect: Mood normal.         Behavior: Behavior normal.         Thought Content:  Thought content normal.         Judgment: Judgment normal.         Discharge Medications:         Medication List      START taking these medications    minoxidil 2.5 MG tablet  Commonly known as: LONITEN  Take 1 tablet by mouth daily  Start taking on: December 19, 2021        CONTINUE taking these medications    albuterol sulfate  (90 Base) MCG/ACT inhaler     Basaglar KwikPen 100 UNIT/ML injection pen  Generic drug: insulin glargine     cloNIDine 0.3 MG/24HR Ptwk  Commonly known as: CATAPRES  Place 1 patch onto the skin once a week     DULoxetine 60 MG extended release capsule  Commonly known as: CYMBALTA     hydrALAZINE 100 MG tablet  Commonly known as: APRESOLINE  Take 1 tablet by mouth every 8 hours     hydroCHLOROthiazide 25 MG tablet  Commonly known as: HYDRODIURIL  Take 2 tablets by mouth daily     isosorbide mononitrate 120 MG extended release tablet  Commonly known as: IMDUR  Take 1 tablet by mouth daily     levothyroxine 150 MCG tablet  Commonly known as: SYNTHROID     lisinopril 20 MG tablet  Commonly known as: PRINIVIL;ZESTRIL  Take 1 tablet by mouth 2 times daily     metoprolol succinate 100 MG extended release tablet  Commonly known as: TOPROL XL  Take 1 tablet by mouth every morning (before breakfast)     NIFEdipine 30 MG extended release tablet  Commonly known as: PROCARDIA XL  Take 3 tablets by mouth daily     NovoLOG FlexPen 100 UNIT/ML injection pen  Generic drug: insulin aspart  Inject 10 Units into the skin 3 times daily Indications: Diabetes     rOPINIRole 2 MG tablet  Commonly known as: REQUIP     Senna-Lax 8.6 MG tablet  Generic drug: senna     sevelamer 800 MG tablet  Commonly known as: RENVELA     simvastatin 40 MG tablet  Commonly known as: ZOCOR     Vitamin D2 10 MCG (400 UNIT) Tabs        STOP taking these medications    nystatin 780064 UNIT/GM cream  Commonly known as: MYCOSTATIN     nystatin [id number] UNIT/ML suspension  Commonly known as: MYCOSTATIN        ASK your doctor about these medications    HYDROcodone-acetaminophen 5-325 MG per tablet  Commonly known as: Norco  Take 1 tablet by mouth every 6 hours as needed for Pain. Where to Get Your Medications      These medications were sent to [place] [id number] #[id number] [address] [phone]  [facility], [address] [place] [address] [place]    Phone: [phone]   · minoxidil 2.5 MG tablet         Discharge Instructions: Follow up with ROMINA Barnes in 3-5 days. Take medications as directed. Resume activity as tolerated. Diet: ADULT DIET; Regular; 4 carb choices (60 gm/meal); Low Fat/Low Chol/High Fiber/2 gm Na     Disposition: Patient is Stableand will be discharged to [address] [place] [address]. Time spent on discharge 38 minutes spent in assessing patient, reviewing medications, discussion with nursing, confirming safe discharge plan and preparation of discharge summary.     Signed:  ROMINA Chun - CNP, 12/18/2021 1:32 PM

## 2021-12-19 LAB
ALBUMIN SERPL-MCNC: 3.7 G/DL (ref 3.5–5.2)
ALP BLD-CCNC: 92 U/L (ref 35–104)
ALT SERPL-CCNC: 9 U/L (ref 5–33)
ANION GAP SERPL CALCULATED.3IONS-SCNC: 14 MMOL/L (ref 7–19)
AST SERPL-CCNC: 16 U/L (ref 5–32)
BASOPHILS ABSOLUTE: 0.1 K/UL (ref 0–0.2)
BASOPHILS RELATIVE PERCENT: 1.8 % (ref 0–1)
BILIRUB SERPL-MCNC: 0.4 MG/DL (ref 0.2–1.2)
BLOOD CULTURE, ROUTINE: NORMAL
BUN BLDV-MCNC: 27 MG/DL (ref 6–20)
CALCIUM SERPL-MCNC: 8.5 MG/DL (ref 8.6–10)
CHLORIDE BLD-SCNC: 93 MMOL/L (ref 98–111)
CO2: 25 MMOL/L (ref 22–29)
CREAT SERPL-MCNC: 4.1 MG/DL (ref 0.5–0.9)
CULTURE, BLOOD 2: NORMAL
EOSINOPHILS ABSOLUTE: 0.3 K/UL (ref 0–0.6)
EOSINOPHILS RELATIVE PERCENT: 5.9 % (ref 0–5)
GFR AFRICAN AMERICAN: 14
GFR NON-AFRICAN AMERICAN: 11
GLUCOSE BLD-MCNC: 342 MG/DL (ref 70–99)
GLUCOSE BLD-MCNC: 386 MG/DL (ref 74–109)
GLUCOSE BLD-MCNC: 394 MG/DL (ref 70–99)
GLUCOSE BLD-MCNC: 421 MG/DL (ref 70–99)
HCT VFR BLD CALC: 38.5 % (ref 37–47)
HEMOGLOBIN: 11.7 G/DL (ref 12–16)
IMMATURE GRANULOCYTES #: 0 K/UL
LYMPHOCYTES ABSOLUTE: 1.4 K/UL (ref 1.1–4.5)
LYMPHOCYTES RELATIVE PERCENT: 29.5 % (ref 20–40)
MAGNESIUM: 2.3 MG/DL (ref 1.6–2.6)
MCH RBC QN AUTO: 28.5 PG (ref 27–31)
MCHC RBC AUTO-ENTMCNC: 30.4 G/DL (ref 33–37)
MCV RBC AUTO: 93.9 FL (ref 81–99)
MONOCYTES ABSOLUTE: 0.4 K/UL (ref 0–0.9)
MONOCYTES RELATIVE PERCENT: 9.2 % (ref 0–10)
NEUTROPHILS ABSOLUTE: 2.4 K/UL (ref 1.5–7.5)
NEUTROPHILS RELATIVE PERCENT: 53.4 % (ref 50–65)
PDW BLD-RTO: 15.1 % (ref 11.5–14.5)
PERFORMED ON: ABNORMAL
PLATELET # BLD: 148 K/UL (ref 130–400)
PMV BLD AUTO: 9.9 FL (ref 9.4–12.3)
POTASSIUM SERPL-SCNC: 5 MMOL/L (ref 3.5–5)
RBC # BLD: 4.1 M/UL (ref 4.2–5.4)
SODIUM BLD-SCNC: 132 MMOL/L (ref 136–145)
TOTAL PROTEIN: 6.1 G/DL (ref 6.6–8.7)
WBC # BLD: 4.6 K/UL (ref 4.8–10.8)

## 2021-12-19 PROCEDURE — 80053 COMPREHEN METABOLIC PANEL: CPT

## 2021-12-19 PROCEDURE — 1210000000 HC MED SURG R&B

## 2021-12-19 PROCEDURE — 6370000000 HC RX 637 (ALT 250 FOR IP): Performed by: HOSPITALIST

## 2021-12-19 PROCEDURE — 6370000000 HC RX 637 (ALT 250 FOR IP): Performed by: INTERNAL MEDICINE

## 2021-12-19 PROCEDURE — 36415 COLL VENOUS BLD VENIPUNCTURE: CPT

## 2021-12-19 PROCEDURE — 6370000000 HC RX 637 (ALT 250 FOR IP): Performed by: NURSE PRACTITIONER

## 2021-12-19 PROCEDURE — 82947 ASSAY GLUCOSE BLOOD QUANT: CPT

## 2021-12-19 PROCEDURE — 85025 COMPLETE CBC W/AUTO DIFF WBC: CPT

## 2021-12-19 PROCEDURE — 83735 ASSAY OF MAGNESIUM: CPT

## 2021-12-19 RX ORDER — INSULIN GLARGINE 100 [IU]/ML
45 INJECTION, SOLUTION SUBCUTANEOUS NIGHTLY
Status: DISCONTINUED | OUTPATIENT
Start: 2021-12-19 | End: 2021-12-22

## 2021-12-19 RX ADMIN — SENNOSIDES 8.6 MG: 8.6 TABLET, FILM COATED ORAL at 10:00

## 2021-12-19 RX ADMIN — MINOXIDIL 2.5 MG: 2.5 TABLET ORAL at 10:00

## 2021-12-19 RX ADMIN — HYDROCHLOROTHIAZIDE 50 MG: 25 TABLET ORAL at 09:59

## 2021-12-19 RX ADMIN — INSULIN LISPRO 3 UNITS: 100 INJECTION, SOLUTION INTRAVENOUS; SUBCUTANEOUS at 20:44

## 2021-12-19 RX ADMIN — INSULIN GLARGINE 45 UNITS: 100 INJECTION, SOLUTION SUBCUTANEOUS at 20:45

## 2021-12-19 RX ADMIN — INSULIN LISPRO 4 UNITS: 100 INJECTION, SOLUTION INTRAVENOUS; SUBCUTANEOUS at 13:10

## 2021-12-19 RX ADMIN — DULOXETINE 60 MG: 60 CAPSULE, DELAYED RELEASE ORAL at 10:00

## 2021-12-19 RX ADMIN — LISINOPRIL 20 MG: 10 TABLET ORAL at 09:59

## 2021-12-19 RX ADMIN — LEVOTHYROXINE SODIUM 150 MCG: 75 TABLET ORAL at 05:34

## 2021-12-19 RX ADMIN — ERGOCALCIFEROL 50000 UNITS: 1.25 CAPSULE ORAL at 13:21

## 2021-12-19 RX ADMIN — HYDRALAZINE HYDROCHLORIDE 100 MG: 50 TABLET, FILM COATED ORAL at 20:45

## 2021-12-19 RX ADMIN — METOPROLOL SUCCINATE 100 MG: 50 TABLET, EXTENDED RELEASE ORAL at 05:34

## 2021-12-19 RX ADMIN — NIFEDIPINE 90 MG: 30 TABLET, EXTENDED RELEASE ORAL at 09:59

## 2021-12-19 RX ADMIN — INSULIN LISPRO 10 UNITS: 100 INJECTION, SOLUTION INTRAVENOUS; SUBCUTANEOUS at 10:00

## 2021-12-19 RX ADMIN — HYDRALAZINE HYDROCHLORIDE 100 MG: 50 TABLET, FILM COATED ORAL at 13:21

## 2021-12-19 RX ADMIN — HYDROCODONE BITARTRATE AND ACETAMINOPHEN 1 TABLET: 5; 325 TABLET ORAL at 21:42

## 2021-12-19 RX ADMIN — ATORVASTATIN CALCIUM 20 MG: 40 TABLET, FILM COATED ORAL at 10:00

## 2021-12-19 RX ADMIN — ISOSORBIDE MONONITRATE 120 MG: 60 TABLET, EXTENDED RELEASE ORAL at 10:00

## 2021-12-19 RX ADMIN — ROPINIROLE HYDROCHLORIDE 4 MG: 4 TABLET, FILM COATED ORAL at 20:45

## 2021-12-19 RX ADMIN — HYDROCODONE BITARTRATE AND ACETAMINOPHEN 1 TABLET: 5; 325 TABLET ORAL at 05:35

## 2021-12-19 RX ADMIN — INSULIN LISPRO 5 UNITS: 100 INJECTION, SOLUTION INTRAVENOUS; SUBCUTANEOUS at 10:07

## 2021-12-19 RX ADMIN — LISINOPRIL 20 MG: 10 TABLET ORAL at 20:45

## 2021-12-19 RX ADMIN — INSULIN LISPRO 10 UNITS: 100 INJECTION, SOLUTION INTRAVENOUS; SUBCUTANEOUS at 13:09

## 2021-12-19 RX ADMIN — HYDRALAZINE HYDROCHLORIDE 100 MG: 50 TABLET, FILM COATED ORAL at 05:34

## 2021-12-19 RX ADMIN — HYDROCODONE BITARTRATE AND ACETAMINOPHEN 1 TABLET: 5; 325 TABLET ORAL at 13:21

## 2021-12-19 ASSESSMENT — PAIN - FUNCTIONAL ASSESSMENT: PAIN_FUNCTIONAL_ASSESSMENT: PREVENTS OR INTERFERES SOME ACTIVE ACTIVITIES AND ADLS

## 2021-12-19 ASSESSMENT — ENCOUNTER SYMPTOMS
VOMITING: 0
NAUSEA: 0
SHORTNESS OF BREATH: 0
ABDOMINAL PAIN: 0
CHEST TIGHTNESS: 0
CONSTIPATION: 0
SORE THROAT: 0
DIARRHEA: 0
COLOR CHANGE: 0

## 2021-12-19 ASSESSMENT — PAIN DESCRIPTION - ORIENTATION: ORIENTATION: LOWER

## 2021-12-19 ASSESSMENT — PAIN DESCRIPTION - LOCATION: LOCATION: BACK

## 2021-12-19 ASSESSMENT — PAIN SCALES - GENERAL
PAINLEVEL_OUTOF10: 9
PAINLEVEL_OUTOF10: 8
PAINLEVEL_OUTOF10: 0
PAINLEVEL_OUTOF10: 9

## 2021-12-19 ASSESSMENT — PAIN DESCRIPTION - PAIN TYPE: TYPE: CHRONIC PAIN

## 2021-12-19 ASSESSMENT — PAIN DESCRIPTION - PROGRESSION: CLINICAL_PROGRESSION: NOT CHANGED

## 2021-12-19 ASSESSMENT — PAIN DESCRIPTION - ONSET: ONSET: ON-GOING

## 2021-12-19 ASSESSMENT — PAIN DESCRIPTION - FREQUENCY: FREQUENCY: INTERMITTENT

## 2021-12-19 ASSESSMENT — PAIN DESCRIPTION - DESCRIPTORS: DESCRIPTORS: DISCOMFORT;ACHING

## 2021-12-19 NOTE — PLAN OF CARE
Problem: Skin Integrity:  Goal: Will show no infection signs and symptoms  Description: Will show no infection signs and symptoms  Outcome: Ongoing  Goal: Absence of new skin breakdown  Description: Absence of new skin breakdown  Outcome: Ongoing  Goal: Risk for impaired skin integrity will decrease  Description: Risk for impaired skin integrity will decrease  Outcome: Ongoing     Problem: Falls - Risk of:  Goal: Will remain free from falls  Description: Will remain free from falls  12/18/2021 1852 by Kvng Aguilera RN  Outcome: Ongoing  12/18/2021 0528 by Bridger Ayala RN  Outcome: Ongoing  12/18/2021 0527 by Bridger Ayala RN  Outcome: Ongoing  Goal: Absence of physical injury  Description: Absence of physical injury  12/18/2021 1852 by Kvng Aguilera RN  Outcome: Ongoing  12/18/2021 0528 by Bridger Ayala RN  Outcome: Ongoing  12/18/2021 0527 by Bridger Ayala RN  Outcome: Ongoing     Problem: Activity:  Goal: Risk for activity intolerance will decrease  Description: Risk for activity intolerance will decrease  12/18/2021 1852 by Kvng Aguilera RN  Outcome: Ongoing  12/18/2021 0528 by Bridger Ayala RN  Outcome: Ongoing  Goal: Ability to tolerate increased activity will improve  Description: Ability to tolerate increased activity will improve  Outcome: Ongoing     Problem:  Bowel/Gastric:  Goal: Bowel function will improve  Description: Bowel function will improve  Outcome: Ongoing  Goal: Diagnostic test results will improve  Description: Diagnostic test results will improve  Outcome: Ongoing  Goal: Occurrences of nausea will decrease  Description: Occurrences of nausea will decrease  Outcome: Ongoing  Goal: Occurrences of vomiting will decrease  Description: Occurrences of vomiting will decrease  Outcome: Ongoing     Problem: Fluid Volume:  Goal: Maintenance of adequate hydration will improve  Description: Maintenance of adequate hydration will improve  Outcome: Ongoing  Goal: Ability to maintain a balanced intake and output will improve  Description: Ability to maintain a balanced intake and output will improve  Outcome: Ongoing     Problem: Health Behavior:  Goal: Ability to state signs and symptoms to report to health care provider will improve  Description: Ability to state signs and symptoms to report to health care provider will improve  Outcome: Ongoing  Goal: Ability to identify and utilize available resources and services will improve  Description: Ability to identify and utilize available resources and services will improve  Outcome: Ongoing  Goal: Ability to manage health-related needs will improve  Description: Ability to manage health-related needs will improve  Outcome: Ongoing  Goal: Identification of resources available to assist in meeting health care needs will improve  Description: Identification of resources available to assist in meeting health care needs will improve  Outcome: Ongoing     Problem: Physical Regulation:  Goal: Diagnostic test results will improve  Description: Diagnostic test results will improve  Outcome: Ongoing  Goal: Complications related to the disease process, condition or treatment will be avoided or minimized  Description: Complications related to the disease process, condition or treatment will be avoided or minimized  Outcome: Ongoing  Goal: Ability to maintain clinical measurements within normal limits will improve  Description: Ability to maintain clinical measurements within normal limits will improve  Outcome: Ongoing     Problem: Sensory:  Goal: Ability to identify factors that increase the pain will improve  Description: Ability to identify factors that increase the pain will improve  Outcome: Ongoing  Goal: Ability to notify healthcare provider of pain before it becomes unmanageable or unbearable will improve  Description: Ability to notify healthcare provider of pain before it becomes unmanageable or unbearable will improve  Outcome: Ongoing  Goal: Pain level will decrease  Description: Pain level will decrease  Outcome: Ongoing     Problem: Coping:  Goal: Ability to adjust to condition or change in health will improve  Description: Ability to adjust to condition or change in health will improve  Outcome: Ongoing  Goal: Ability to identify and develop effective coping behavior will improve  Description: Ability to identify and develop effective coping behavior will improve  Outcome: Ongoing     Problem: Metabolic:  Goal: Ability to maintain appropriate glucose levels will improve  Description: Ability to maintain appropriate glucose levels will improve  12/18/2021 1852 by Raul Son RN  Outcome: Ongoing  12/18/2021 0528 by Niranjan Restrepo RN  Outcome: Ongoing     Problem: Nutritional:  Goal: Maintenance of adequate nutrition will improve  Description: Maintenance of adequate nutrition will improve  Outcome: Ongoing  Goal: Progress toward achieving an optimal weight will improve  Description: Progress toward achieving an optimal weight will improve  Outcome: Ongoing  Goal: Ability to identify appropriate dietary choices will improve  Description: Ability to identify appropriate dietary choices will improve  Outcome: Ongoing     Problem: Tissue Perfusion:  Goal: Adequacy of tissue perfusion will improve  Description: Adequacy of tissue perfusion will improve  12/18/2021 1852 by Raul Son RN  Outcome: Ongoing  12/18/2021 0528 by Niranjan Restrepo RN  Outcome: Ongoing     Problem: Cardiac:  Goal: Complications related to the disease process, condition or treatment will be avoided or minimized  Description: Complications related to the disease process, condition or treatment will be avoided or minimized  Outcome: Ongoing  Goal: Hemodynamic stability will improve  Description: Hemodynamic stability will improve  Outcome: Ongoing  Goal: Cerebral tissue perfusion will improve  Description: Cerebral tissue perfusion will improve  Outcome: Ongoing

## 2021-12-19 NOTE — PROGRESS NOTES
[facility]      Patient:  Carlyn Brewster  YOB: 1970  Date of Service: 12/19/2021  MRN: [id number]   Acct: [de-identified]   Primary Care Physician: Beronica December, APRN  Advance Directive: Full Code  Admit Date: 12/14/2021       Hospital Day: 5  Portions of this note have been copied forward, however, changed to reflect the most current clinical status of this patient. CHIEF COMPLAINT nausea and vomiting    SUBJECTIVE:  Patient states she is feeling extremely well today. CUMULATIVE HOSPITAL COURSE:  Patient is a 58-year-old female with past medical history ESRD on dialysis, hypertension, diabetes, gastroparesis, GERD, and restless leg syndrome who presented to [facility] ED with complaints of nausea, vomiting, and diarrhea x3 days. Ms. Duyen Smith reported nonbloody vomiting and diarrhea, fatigue, and weakness. She reported she had not been able to keep her medications down. Work-up in the ED revealed elevated glucose and hypertensive urgency. Patient was admitted to the ICU for Cardene and insulin drips. Patient underwent IV fluid resuscitation. Medications were transitioned to oral and subcutaneous insulin and she was transferred out of the ICU on 12/15/2021. Oral medications were resumed and clonidine patch was resumed as well. Patient continued to have blood pressures with systolics over 819. Minoxidil initiated and indicated better control of hypertension. Glucose being controlled with subcu insulin at this time, basal insulin increased. Patient accepted to SNF pending precert. Review of Systems:   Review of Systems   Constitutional: Negative for activity change, chills, fatigue and fever. HENT: Negative for congestion, mouth sores and sore throat. \"bad teeth\"     Respiratory: Negative for chest tightness and shortness of breath. Cardiovascular: Negative for chest pain, palpitations and leg swelling.    Gastrointestinal: Negative for abdominal pain, constipation, diarrhea, nausea and vomiting. Genitourinary: Negative for difficulty urinating, flank pain, frequency and urgency. Musculoskeletal: Negative for arthralgias and myalgias. Skin: Negative for color change and wound. Neurological: Negative for dizziness, light-headedness and headaches. Hematological: Negative for adenopathy. Psychiatric/Behavioral: Negative for agitation and confusion. 14 point review of systems is negative except as specifically addressed above. Objective:   VITALS:  BP (!) 147/57   Pulse 69   Temp 96.6 °F (35.9 °C) (Temporal)   Resp 18   Ht 5' 6\" (1.676 m)   Wt 150 lb (68 kg)   SpO2 97%   BMI 24.21 kg/m²   24HR INTAKE/OUTPUT:    Intake/Output Summary (Last 24 hours) at 12/19/2021 1255  Last data filed at 12/19/2021 0509  Gross per 24 hour   Intake 720 ml   Output 100 ml   Net 620 ml       Physical Exam  Vitals and nursing note reviewed. Constitutional:       Appearance: She is ill-appearing (Chronically). Comments: Appears much older than stated age   HENT:      Head: Normocephalic. Mouth/Throat:      Mouth: Mucous membranes are moist.      Comments: Multiple missing and broken teeth  Eyes:      Conjunctiva/sclera: Conjunctivae normal.      Pupils: Pupils are equal, round, and reactive to light. Cardiovascular:      Rate and Rhythm: Normal rate and regular rhythm. Pulses: Normal pulses. Heart sounds: Normal heart sounds. Pulmonary:      Effort: Pulmonary effort is normal.      Breath sounds: Normal breath sounds. Abdominal:      General: Abdomen is flat. Bowel sounds are normal. There is no distension. Palpations: Abdomen is soft. Tenderness: There is no abdominal tenderness. There is no guarding. Musculoskeletal:         General: Normal range of motion. Cervical back: Normal range of motion and neck supple. Right lower leg: No edema. Left lower leg: No edema. Skin:     General: Skin is warm and dry.       Capillary Refill: Capillary refill takes less than 2 seconds. Neurological:      General: No focal deficit present. Mental Status: She is alert and oriented to person, place, and time. Psychiatric:         Mood and Affect: Mood normal.         Behavior: Behavior normal.         Thought Content: Thought content normal.         Judgment: Judgment normal.             Medications:      dextrose      dextrose 5 % and 0.45 % NaCl Stopped (12/14/21 0846)      insulin glargine  45 Units SubCUTAneous Nightly    nicotine  1 patch TransDERmal Daily    minoxidil  2.5 mg Oral Daily    cloNIDine  1 patch TransDERmal Weekly    DULoxetine  60 mg Oral Daily    vitamin D  50,000 Units Oral Q14 Days    hydrALAZINE  100 mg Oral 3 times per day    hydroCHLOROthiazide  50 mg Oral Daily    isosorbide mononitrate  120 mg Oral Daily    levothyroxine  150 mcg Oral Daily    lisinopril  20 mg Oral BID    metoprolol succinate  100 mg Oral QAM AC    NIFEdipine  90 mg Oral Daily    rOPINIRole  4 mg Oral Nightly    senna  1 tablet Oral Daily    atorvastatin  20 mg Oral Daily    [Held by provider] insulin lispro  10 Units SubCUTAneous TID WC    insulin lispro  10 Units SubCUTAneous TID AC    insulin lispro  0-6 Units SubCUTAneous TID WC    insulin lispro  0-3 Units SubCUTAneous Nightly     ondansetron, glucose, dextrose, glucagon (rDNA), dextrose, potassium chloride, magnesium sulfate, sodium phosphate IVPB **OR** sodium phosphate IVPB **OR** sodium phosphate IVPB, dextrose 5 % and 0.45 % NaCl, hydrALAZINE, labetalol, HYDROcodone-acetaminophen, albuterol  ADULT DIET; Easy to Chew; 3 carb choices (45 gm/meal);  Low Fat/Low Chol/High Fiber/2 gm Na     Lab and other Data:     Recent Labs     12/17/21  0157 12/18/21  0409 12/19/21  0449   WBC 4.4* 4.7* 4.6*   HGB 10.4* 11.2* 11.7*   * 131 148     Recent Labs     12/18/21  0409 12/18/21  1220 12/19/21  0449   * 135* 132*   K 6.2* 3.6 5.0   CL 90* 95* 93*   CO2 22 25 25   BUN 43* 14 27*   CREATININE 6.0* 2.5* 4.1*   GLUCOSE 319* 219* 386*     Recent Labs     12/17/21  0157 12/18/21  0409 12/19/21  0449   AST 16 14 16   ALT 8 8 9   BILITOT 0.4 0.5 0.4   ALKPHOS 90 93 92     Troponin T: No results for input(s): TROPONINI in the last 72 hours. Pro-BNP: No results for input(s): BNP in the last 72 hours. INR: No results for input(s): INR in the last 72 hours. UA:No results for input(s): NITRITE, COLORU, PHUR, LABCAST, WBCUA, RBCUA, MUCUS, TRICHOMONAS, YEAST, BACTERIA, CLARITYU, SPECGRAV, LEUKOCYTESUR, UROBILINOGEN, BILIRUBINUR, BLOODU, GLUCOSEU, AMORPHOUS in the last 72 hours. Invalid input(s): [place]  A1C: No results for input(s): LABA1C in the last 72 hours. ABG:No results for input(s): PHART, HVR7VOP, PO2ART, LVX0BBO, BEART, HGBAE, C3FJBLAJ, CARBOXHGBART in the last 72 hours. RAD:   XR CHEST PORTABLE    Result Date: 12/14/2021  1. Moderate improvement in pulmonary vascular congestion since the previous study. 2. A small right basal pleural effusion. 3. A persistent moderate cardiomegaly. 4. Moderate diffuse osteopenia.  Signed by Dr Sully Leyva           Assessment/Plan   Hyperosmolar hyperglycemic state (HHS) [facility]) / Type 2 diabetes mellitus with chronic kidney disease on chronic dialysis, with long-term current use of insulin (HCC)              -Transitioned off of insulin drip              -Basal increased   -continue prandial insulin              -Sliding scale insulin              -Accu-Cheks              -Hypoglycemia protocol in place              -DM teaching     Active Problems:  ESRD (end stage renal disease) on dialysis [facility])              -Nephrology managing dialysis care       Hypertensive emergency              -Transitioned off Cardene drip              -Oral medications resumed              -Minoxidil added, much better control              -Every 4 vital signs       Generalized weakness              --noted   -accepted to SNF pending precert       DVT Prophylaxis: heparin      ROMINA Busch - CNP, 12/19/2021 12:55 PM

## 2021-12-19 NOTE — PROGRESS NOTES
Nephrology ([address] [facility]) Progress Note    Patient:  Bernie Ramirez  YOB: 1970  Date of Service: 12/19/2021  MRN: [id number]   Acct: [de-identified]   Primary Care Physician: ROMINA Cortes  Advance Directive: Full Code  Admit Date: 12/14/2021       Hospital Day: 5  Referring Provider: Milton Mascorro MD    Patient Seen, Chart, Consults notes, Labs, Radiology studies reviewed. Subjective:    Patient is a 62-year-old woman with a past medical history of hypertension, type 2 diabetes and end-stage renal disease.  She is on chronic maintenance hemodialysis at [facility] on T,T,S.  She was in her regular state of health until recently when she started having nausea recurrent vomiting and diarrhea.  Patient was unable to keep her medications down.  She presented to the emergency room and she was having a hypertensive crisis with severe hyperglycemia (>1000).   She was subsequently admitted to ICU.  Renal service was consulted to manage her ESRD.  Patient is s/p dialysis on 12/17. Her BPs are now better controlled since minoxidil was added. She was unable to be discharged until her place is ready for her.     Allergies:  Penicillins, Lamisil advanced [terbinafine], Stadol [butorphanol], and Reglan [metoclopramide]    Medicines:  Current Facility-Administered Medications   Medication Dose Route Frequency Provider Last Rate Last Admin    nicotine (NICODERM CQ) 14 MG/24HR 1 patch  1 patch TransDERmal Daily Sweta Johnson MD   1 patch at 12/19/21 0959    minoxidil (LONITEN) tablet 2.5 mg  2.5 mg Oral Daily ROMINA Rosado - CNP   2.5 mg at 12/19/21 1000    ondansetron (ZOFRAN) injection 4 mg  4 mg IntraVENous Q6H PRN Milton Mascorro MD   4 mg at 12/18/21 1603    glucose (GLUTOSE) 40 % oral gel 15 g  15 g Oral PRN Bhumika Varela MD   15 g at 12/17/21 1654    dextrose 50 % IV solution  12.5 g IntraVENous PRN Bhumika Varela MD        glucagon (rDNA) injection 1 mg  1 mg IntraMUSCular PRN Marielle Vargas MD        dextrose 5 % solution  100 mL/hr IntraVENous PRN Marielle Vargas MD        potassium chloride 10 mEq/100 mL IVPB (Peripheral Line)  10 mEq IntraVENous PRN Leroy Major  mL/hr at 12/14/21 2207 10 mEq at 12/14/21 2207    magnesium sulfate 1000 mg in dextrose 5% 100 mL IVPB  1,000 mg IntraVENous PRN Leroy Major MD        sodium phosphate 10 mmol in dextrose 5 % 250 mL IVPB  10 mmol IntraVENous PRN Leroy Major MD        Or    sodium phosphate 15 mmol in dextrose 5 % 250 mL IVPB  15 mmol IntraVENous PRN Leroy Major MD   Stopped at 12/15/21 0307    Or    sodium phosphate 20 mmol in dextrose 5 % 500 mL IVPB  20 mmol IntraVENous PRN Leroy Major MD        dextrose 5 % and 0.45 % sodium chloride infusion   IntraVENous Continuous PRN Leroy Major MD   Stopped at 12/14/21 1836    cloNIDine (CATAPRES) 0.3 MG/24HR 1 patch  1 patch TransDERmal Weekly Leroy Major MD        hydrALAZINE (APRESOLINE) injection 10 mg  10 mg IntraVENous Q6H PRN Leroy Major MD   10 mg at 12/17/21 1730    labetalol (NORMODYNE;TRANDATE) injection 20 mg  20 mg IntraVENous Q4H PRN Leroy Major MD        DULoxetine (CYMBALTA) extended release capsule 60 mg  60 mg Oral Daily Leroy Major MD   60 mg at 12/19/21 1000    vitamin D (ERGOCALCIFEROL) capsule 50,000 Units  50,000 Units Oral Q14 Days Leroy Major MD        hydrALAZINE (APRESOLINE) tablet 100 mg  100 mg Oral 3 times per day Leroy Major MD   100 mg at 12/19/21 0534    hydroCHLOROthiazide (HYDRODIURIL) tablet 50 mg  50 mg Oral Daily Leroy Major MD   50 mg at 12/19/21 0959    HYDROcodone-acetaminophen (NORCO) 5-325 MG per tablet 1 tablet  1 tablet Oral Q6H PRN Leroy Major MD   1 tablet at 12/19/21 0535    isosorbide mononitrate (IMDUR) extended release tablet 120 mg  120 mg Oral Daily Leroy Major MD   120 mg at 12/19/21 1000    levothyroxine (SYNTHROID) tablet 150 mcg  150 mcg Oral Daily Wilmer Hung MD   150 mcg at 12/19/21 0534    lisinopril (PRINIVIL;ZESTRIL) tablet 20 mg  20 mg Oral BID Wilmer Hung MD   20 mg at 12/19/21 9701    metoprolol succinate (TOPROL XL) extended release tablet 100 mg  100 mg Oral QAM AC Wilmer Hung MD   100 mg at 12/19/21 0534    NIFEdipine (PROCARDIA XL) extended release tablet 90 mg  90 mg Oral Daily Wilmer Hung MD   90 mg at 12/19/21 0959    rOPINIRole (REQUIP) tablet 4 mg  4 mg Oral Nightly Wilmer Hung MD   4 mg at 12/18/21 2201    senna (SENOKOT) tablet 8.6 mg  1 tablet Oral Daily Wilmer Hung MD   8.6 mg at 12/19/21 1000    atorvastatin (LIPITOR) tablet 20 mg  20 mg Oral Daily Wilmer Hung MD   20 mg at 12/19/21 1000    albuterol (PROVENTIL) nebulizer solution 2.5 mg  2.5 mg Nebulization Q4H PRN Wilmer Hung MD        [Held by provider] insulin lispro (HUMALOG) injection vial 10 Units  10 Units SubCUTAneous TID  Wilmer Hung MD        insulin lispro (HUMALOG) injection vial 10 Units  10 Units SubCUTAneous TID [facility] Wilmer Hung MD   10 Units at 12/19/21 1000    insulin glargine (LANTUS) injection vial 40 Units  40 Units SubCUTAneous Nightly Wilmer Hung MD   40 Units at 12/15/21 0101    insulin lispro (HUMALOG) injection vial 0-6 Units  0-6 Units SubCUTAneous TID  Wilmer Hung MD   5 Units at 12/19/21 1007    insulin lispro (HUMALOG) injection vial 0-3 Units  0-3 Units SubCUTAneous Nightly Wilmer Hung MD           Past Medical History:  Past Medical History:   Diagnosis Date    Arthritis     Chronic kidney disease, stage 5, kidney failure ([address]     Closed fracture of right shoulder girdle, with routine healing, subsequent encounter     DM (diabetes mellitus) type II controlled with renal manifestation ([facility] 06/1993    Gastroparesis     GERD (gastroesophageal reflux disease)     Hemodialysis patient ([facility]     [facility]    Hypertension     Hypothyroid     Nasal congestion     recent    Neuropathy     Noncompliance with medications     Palliative care patient 10/08/2019    Restless leg syndrome        Past Surgical History:  Past Surgical History:   Procedure Laterality Date    BREAST SURGERY Left 2008    infected milk gland removed    BREAST SURGERY Right 5/25/2021    WEDGE EXCISION RIGHT BREAST DUCT WITH LACRIMAL DUCT PROBE, PEC BLOCK performed by Naomi Beaver MD at [address], [facility]      2008    COLONOSCOPY Left 9/17/2020    Dr Severo Corrales hepatic flexure-Severe tortuosity with severe spasming, 1 yr recall    DIALYSIS FISTULA CREATION Left 1/25/2019    REVISION LEFT UPPER EXTREMITY AV ACCESS WITH LEFT BRACHIAL ARTERY TO LEFT AXILLARY VEIN WITH  INTERPOSITIONAL ARTEGRAFT   performed by Masha Luna MD at [address]  2012    [address] Right 1/25/2019    INSERTION OF RIGHT INTERNAL JUGULAR HEMODIALYSIS CATHETER performed by Masha Luna MD at [facility]  08/17/2018    1.  Moderately increased stainable iron identified by special stain in Kupffer cells and occasional hepatocytes. Negative for evidence of significant portal or lobular inflammation. Negative for evidence of significant fibrosis    ME COLONOSCOPY W/BIOPSY SINGLE/MULTIPLE N/A 10/20/2017    Dr Mary Goodwin prep-Tubular AP (-) dysplasia x 2--3 yr recall    ME EGD TRANSORAL BIOPSY SINGLE/MULTIPLE N/A 12/27/2016    Dr Terrell Newsome EGD TRANSORAL BIOPSY SINGLE/MULTIPLE N/A 10/20/2017    Dr Antionette Harrison (-)   [facility] VASCULAR SURGERY Left 2016    fistula by Dr Deya Barnhart at [address]  10/02/2017    [facility]. Left upper fistulograms/venograms, balloon angioplasty cephalic vein arch [id number] conquest.    VASCULAR SURGERY  08/2018    FISTULOGRAM    VASCULAR SURGERY  10/29/2018    [facility]. Left upper fistulograms/venograms    VASCULAR SURGERY  12/19/2018    [facility]. Nestor Edge upper arteriograms.  VASCULAR SURGERY  2019    [facility]. Removal of tunneled dialyis catheter right internal jugular vein.  VASCULAR SURGERY  2019    [facility]. Left upper extremity fistulogram including venography to the superior vena cava. Balloon angioplasty left subclavian vein with 10mm x 40mm conquest balloon. Balloon angioplasty mid/proximal upper arm cephalic vein with 58TC x 40mm conquest balloon. Venograms after balloon angioplasty. Stent left mid/proximal upper arm cephalic vein stenosis fluency 10mm x 60mm self-expanding covered stent. Balloon     VASCULAR SURGERY  2019    cont angioplasty stent with 10mm x 40mm conquest balloon. Completion venograms left upper extremity.        Family History  Family History   Problem Relation Age of Onset    Colon Cancer Mother          at 63    Colon Polyps Mother     Lung Cancer Father          at 79    Colon Polyps Father     Stomach Cancer Sister     Breast Cancer Sister 27    Depression Daughter 25        committed suicide    Liver Cancer Neg Hx     Liver Disease Neg Hx     Esophageal Cancer Neg Hx     Rectal Cancer Neg Hx        Social History  Social History     Socioeconomic History    Marital status: Single     Spouse name: Not on file    Number of children: 2    Years of education: Not on file    Highest education level: Not on file   Occupational History    Not on file   Tobacco Use    Smoking status: Current Every Day Smoker     Packs/day: 0.50     Years: 33.00     Pack years: 16.50     Types: Cigarettes    Smokeless tobacco: Never Used    Tobacco comment: NOW at 1/4 PD, started at age 24 and has averaged 2 PPD   Vaping Use    Vaping Use: Never used   Substance and Sexual Activity    Alcohol use: No    Drug use: Not Currently     Types: Marijuana Deboraha Sanwatson)    Sexual activity: Not Currently     Partners: Male   Other Topics Concern    Not on file   Social History Narrative    Not on file     Social Determinants of Health Financial Resource Strain:     Difficulty of Paying Living Expenses: Not on file   Food Insecurity:     Worried About Running Out of Food in the Last Year: Not on file    Kendy of Food in the Last Year: Not on file   Transportation Needs:     Lack of Transportation (Medical): Not on file    Lack of Transportation (Non-Medical): Not on file   Physical Activity:     Days of Exercise per Week: Not on file    Minutes of Exercise per Session: Not on file   Stress:     Feeling of Stress : Not on file   Social Connections:     Frequency of Communication with Friends and Family: Not on file    Frequency of Social Gatherings with Friends and Family: Not on file    Attends Sikh Services: Not on file    Active Member of [address] [place] [address] [employer] or Organizations: Not on file    Attends Club or Organization Meetings: Not on file    Marital Status: Not on file   Intimate Partner Violence:     Fear of Current or Ex-Partner: Not on file    Emotionally Abused: Not on file    Physically Abused: Not on file    Sexually Abused: Not on file   Housing Stability:     Unable to Pay for Housing in the Last Year: Not on file    Number of [place] in the Last Year: Not on file    Unstable Housing in the Last Year: Not on file         Review of Systems:  History obtained from chart review and the patient  General ROS: No fever or chills  Respiratory ROS: No cough, shortness of breath, wheezing  Cardiovascular ROS: no chest pain or dyspnea on exertion  Gastrointestinal ROS: No abdominal pain or melena  Genito-Urinary ROS: No dysuria or hematuria  Musculoskeletal ROS: No joint pain or swelling         Objective:  Blood pressure (!) 147/57, pulse 69, temperature 96.6 °F (35.9 °C), temperature source Temporal, resp. rate 18, height 5' 6\" (1.676 m), weight 150 lb (68 kg), SpO2 97 %.     Intake/Output Summary (Last 24 hours) at 12/19/2021 1136  Last data filed at 12/19/2021 0509  Gross per 24 hour   Intake 720 ml   Output 100 ml Net 620 ml     General: alert and oriented x3   Chest:  clear to auscultation bilaterally without respiratory distress  CVS: regular rate and rhythm  Abdominal: soft, nontender, normal bowel sounds  Extremities: no cyanosis or edema  Skin: warm and dry without rash    Labs:  BMP:   Recent Labs     12/18/21  0409 12/18/21  1220 12/19/21 0449   * 135* 132*   K 6.2* 3.6 5.0   CL 90* 95* 93*   CO2 22 25 25   BUN 43* 14 27*   CREATININE 6.0* 2.5* 4.1*   CALCIUM 8.7 8.7 8.5*     CBC:   Recent Labs     12/17/21  0157 12/18/21  0409 12/19/21 0449   WBC 4.4* 4.7* 4.6*   HGB 10.4* 11.2* 11.7*   HCT 33.8* 36.7* 38.5   MCV 92.9 91.8 93.9   * 131 148     LIVER PROFILE:   Recent Labs     12/17/21 0157 12/18/21  0409 12/19/21 0449   AST 16 14 16   ALT 8 8 9   BILITOT 0.4 0.5 0.4   ALKPHOS 90 93 92     PT/INR: No results for input(s): PROTIME, INR in the last 72 hours. APTT: No results for input(s): APTT in the last 72 hours. BNP:  No results for input(s): BNP in the last 72 hours. Ionized Calcium:No results for input(s): IONCA in the last 72 hours. Magnesium:  Recent Labs     12/17/21  0157 12/18/21  0409 12/19/21 0449   MG 2.2 2.5 2.3     Phosphorus:  No results for input(s): PHOS in the last 72 hours. HgbA1C:   No results for input(s): LABA1C in the last 72 hours. Hepatic:   Recent Labs     12/17/21  0157 12/18/21  0409 12/19/21 0449   ALKPHOS 90 93 92   ALT 8 8 9   AST 16 14 16   PROT 5.9* 5.8* 6.1*   BILITOT 0.4 0.5 0.4   LABALBU 3.5 3.3* 3.7     Lactic Acid:   No results for input(s): LACTA in the last 72 hours. Troponin: No results for input(s): CKTOTAL, CKMB, TROPONINT in the last 72 hours. ABGs: No results for input(s): PH, PCO2, PO2, HCO3, O2SAT in the last 72 hours. CRP:  No results for input(s): CRP in the last 72 hours. Sed Rate:  No results for input(s): SEDRATE in the last 72 hours. Cultures:   No results for input(s): CULTURE in the last 72 hours.     Radiology reports as per the Radiologist  Radiology: XR CHEST PORTABLE    Result Date: 12/14/2021  Examination. XR CHEST PORTABLE 12/14/2021 7:59 AM History: Fever. A frontal portable upright view of the chest is obtained and compared with the previous study dated 11/18/2021. There is moderate improvement in pulmonary vascular congestion. There is a small right basal pleural effusion. There is no pneumothorax. There is persistent moderate cardiomegaly. There is a left subclavian vascular stent in place similar to the previous study. There is moderate diffuse osteopenia. No acute bony abnormality. 1. Moderate improvement in pulmonary vascular congestion since the previous study. 2. A small right basal pleural effusion. 3. A persistent moderate cardiomegaly. 4. Moderate diffuse osteopenia. Signed by Dr Dionna Koch     -End stage renal disease  -Type 2 diabetes with renal disease-severe hyperglycemia  -Hypertensive crisis  -Pseudohyponatremia  -Hyperkalemia  -Metabolic acidosis  -Nausea and vomiting  -Diarrhea  -Hypophosphatemia    Plan:  Continue oral BP meds. Dialysis is due next on December 21. Follow-up labs. Continue current BP meds regimen.

## 2021-12-20 LAB
ALBUMIN SERPL-MCNC: 3.5 G/DL (ref 3.5–5.2)
ALP BLD-CCNC: 86 U/L (ref 35–104)
ALT SERPL-CCNC: 7 U/L (ref 5–33)
ANION GAP SERPL CALCULATED.3IONS-SCNC: 13 MMOL/L (ref 7–19)
AST SERPL-CCNC: 14 U/L (ref 5–32)
BASOPHILS ABSOLUTE: 0.1 K/UL (ref 0–0.2)
BASOPHILS RELATIVE PERCENT: 2.2 % (ref 0–1)
BILIRUB SERPL-MCNC: 0.3 MG/DL (ref 0.2–1.2)
BUN BLDV-MCNC: 41 MG/DL (ref 6–20)
CALCIUM SERPL-MCNC: 8.6 MG/DL (ref 8.6–10)
CHLORIDE BLD-SCNC: 91 MMOL/L (ref 98–111)
CO2: 25 MMOL/L (ref 22–29)
CREAT SERPL-MCNC: 5.2 MG/DL (ref 0.5–0.9)
EOSINOPHILS ABSOLUTE: 0.3 K/UL (ref 0–0.6)
EOSINOPHILS RELATIVE PERCENT: 6.6 % (ref 0–5)
GFR AFRICAN AMERICAN: 10
GFR NON-AFRICAN AMERICAN: 9
GLUCOSE BLD-MCNC: 111 MG/DL (ref 70–99)
GLUCOSE BLD-MCNC: 145 MG/DL (ref 70–99)
GLUCOSE BLD-MCNC: 235 MG/DL (ref 74–109)
GLUCOSE BLD-MCNC: 59 MG/DL (ref 70–99)
GLUCOSE BLD-MCNC: 64 MG/DL (ref 70–99)
GLUCOSE BLD-MCNC: 70 MG/DL (ref 70–99)
GLUCOSE BLD-MCNC: 71 MG/DL (ref 70–99)
GLUCOSE BLD-MCNC: 73 MG/DL (ref 70–99)
GLUCOSE BLD-MCNC: 77 MG/DL (ref 70–99)
HCT VFR BLD CALC: 36.3 % (ref 37–47)
HEMOGLOBIN: 11.1 G/DL (ref 12–16)
IMMATURE GRANULOCYTES #: 0 K/UL
LYMPHOCYTES ABSOLUTE: 1.5 K/UL (ref 1.1–4.5)
LYMPHOCYTES RELATIVE PERCENT: 31.9 % (ref 20–40)
MAGNESIUM: 2.5 MG/DL (ref 1.6–2.6)
MCH RBC QN AUTO: 28.6 PG (ref 27–31)
MCHC RBC AUTO-ENTMCNC: 30.6 G/DL (ref 33–37)
MCV RBC AUTO: 93.6 FL (ref 81–99)
MONOCYTES ABSOLUTE: 0.4 K/UL (ref 0–0.9)
MONOCYTES RELATIVE PERCENT: 9.7 % (ref 0–10)
NEUTROPHILS ABSOLUTE: 2.3 K/UL (ref 1.5–7.5)
NEUTROPHILS RELATIVE PERCENT: 49.4 % (ref 50–65)
PDW BLD-RTO: 14.8 % (ref 11.5–14.5)
PERFORMED ON: ABNORMAL
PERFORMED ON: NORMAL
PLATELET # BLD: 149 K/UL (ref 130–400)
PMV BLD AUTO: 9.9 FL (ref 9.4–12.3)
POTASSIUM SERPL-SCNC: 5.1 MMOL/L (ref 3.5–5)
RBC # BLD: 3.88 M/UL (ref 4.2–5.4)
SARS-COV-2, NAAT: NOT DETECTED
SODIUM BLD-SCNC: 129 MMOL/L (ref 136–145)
TOTAL PROTEIN: 6 G/DL (ref 6.6–8.7)
WBC # BLD: 4.6 K/UL (ref 4.8–10.8)

## 2021-12-20 PROCEDURE — 6370000000 HC RX 637 (ALT 250 FOR IP): Performed by: NURSE PRACTITIONER

## 2021-12-20 PROCEDURE — 2500000003 HC RX 250 WO HCPCS: Performed by: EMERGENCY MEDICINE

## 2021-12-20 PROCEDURE — 6370000000 HC RX 637 (ALT 250 FOR IP): Performed by: INTERNAL MEDICINE

## 2021-12-20 PROCEDURE — 6370000000 HC RX 637 (ALT 250 FOR IP): Performed by: HOSPITALIST

## 2021-12-20 PROCEDURE — 97165 OT EVAL LOW COMPLEX 30 MIN: CPT

## 2021-12-20 PROCEDURE — 82947 ASSAY GLUCOSE BLOOD QUANT: CPT

## 2021-12-20 PROCEDURE — 85025 COMPLETE CBC W/AUTO DIFF WBC: CPT

## 2021-12-20 PROCEDURE — 80053 COMPREHEN METABOLIC PANEL: CPT

## 2021-12-20 PROCEDURE — 97535 SELF CARE MNGMENT TRAINING: CPT

## 2021-12-20 PROCEDURE — 6360000002 HC RX W HCPCS: Performed by: INTERNAL MEDICINE

## 2021-12-20 PROCEDURE — 97116 GAIT TRAINING THERAPY: CPT

## 2021-12-20 PROCEDURE — 83735 ASSAY OF MAGNESIUM: CPT

## 2021-12-20 PROCEDURE — 87635 SARS-COV-2 COVID-19 AMP PRB: CPT

## 2021-12-20 PROCEDURE — 97161 PT EVAL LOW COMPLEX 20 MIN: CPT

## 2021-12-20 PROCEDURE — 36415 COLL VENOUS BLD VENIPUNCTURE: CPT

## 2021-12-20 PROCEDURE — 1210000000 HC MED SURG R&B

## 2021-12-20 PROCEDURE — 6370000000 HC RX 637 (ALT 250 FOR IP): Performed by: EMERGENCY MEDICINE

## 2021-12-20 RX ADMIN — NIFEDIPINE 90 MG: 30 TABLET, EXTENDED RELEASE ORAL at 08:31

## 2021-12-20 RX ADMIN — ONDANSETRON 4 MG: 2 INJECTION INTRAMUSCULAR; INTRAVENOUS at 19:27

## 2021-12-20 RX ADMIN — INSULIN LISPRO 10 UNITS: 100 INJECTION, SOLUTION INTRAVENOUS; SUBCUTANEOUS at 08:36

## 2021-12-20 RX ADMIN — HYDROCODONE BITARTRATE AND ACETAMINOPHEN 1 TABLET: 5; 325 TABLET ORAL at 18:28

## 2021-12-20 RX ADMIN — LISINOPRIL 20 MG: 10 TABLET ORAL at 08:31

## 2021-12-20 RX ADMIN — HYDROCHLOROTHIAZIDE 50 MG: 25 TABLET ORAL at 08:30

## 2021-12-20 RX ADMIN — ATORVASTATIN CALCIUM 20 MG: 40 TABLET, FILM COATED ORAL at 08:31

## 2021-12-20 RX ADMIN — ISOSORBIDE MONONITRATE 120 MG: 60 TABLET, EXTENDED RELEASE ORAL at 08:31

## 2021-12-20 RX ADMIN — LEVOTHYROXINE SODIUM 150 MCG: 75 TABLET ORAL at 05:59

## 2021-12-20 RX ADMIN — DEXTROSE MONOHYDRATE 12.5 G: 25 INJECTION, SOLUTION INTRAVENOUS at 23:36

## 2021-12-20 RX ADMIN — METOPROLOL SUCCINATE 100 MG: 50 TABLET, EXTENDED RELEASE ORAL at 05:59

## 2021-12-20 RX ADMIN — MINOXIDIL 2.5 MG: 2.5 TABLET ORAL at 08:31

## 2021-12-20 RX ADMIN — INSULIN LISPRO 1 UNITS: 100 INJECTION, SOLUTION INTRAVENOUS; SUBCUTANEOUS at 08:36

## 2021-12-20 RX ADMIN — HYDRALAZINE HYDROCHLORIDE 100 MG: 50 TABLET, FILM COATED ORAL at 05:59

## 2021-12-20 RX ADMIN — HYDRALAZINE HYDROCHLORIDE 100 MG: 50 TABLET, FILM COATED ORAL at 13:05

## 2021-12-20 RX ADMIN — DULOXETINE 60 MG: 60 CAPSULE, DELAYED RELEASE ORAL at 08:31

## 2021-12-20 RX ADMIN — HYDROCODONE BITARTRATE AND ACETAMINOPHEN 1 TABLET: 5; 325 TABLET ORAL at 13:05

## 2021-12-20 RX ADMIN — ONDANSETRON 4 MG: 2 INJECTION INTRAMUSCULAR; INTRAVENOUS at 12:36

## 2021-12-20 RX ADMIN — SENNOSIDES 8.6 MG: 8.6 TABLET, FILM COATED ORAL at 08:31

## 2021-12-20 RX ADMIN — Medication 15 G: at 22:15

## 2021-12-20 ASSESSMENT — ENCOUNTER SYMPTOMS
ABDOMINAL PAIN: 0
SHORTNESS OF BREATH: 0
VOMITING: 0
DIARRHEA: 0
CONSTIPATION: 0
SORE THROAT: 0
COLOR CHANGE: 0
CHEST TIGHTNESS: 0
NAUSEA: 0

## 2021-12-20 ASSESSMENT — PAIN SCALES - GENERAL
PAINLEVEL_OUTOF10: 7
PAINLEVEL_OUTOF10: 7

## 2021-12-20 NOTE — PROGRESS NOTES
Physical Therapy    Facility/Department: [facility] SURG SERVICES  Initial Assessment    NAME: Wojciech Martinez  : 1970  MRN: [id number]    Date of Service: 2021    Discharge Recommendations:  Continue to assess pending progress,Patient would benefit from continued therapy after discharge        Assessment   Body structures, Functions, Activity limitations: Decreased functional mobility ; Decreased ADL status; Decreased strength;Decreased endurance;Decreased balance  Assessment: Pt ABLE TO STAND AND TAKE SHORT WALK IN ROOM WITH ASSIST. WILL PROGRESS AS TOLERATED. Pt PLANS FOR SNF @ D/C FOR FURTHER REHAB. PT Education: PT Role;Plan of Care  REQUIRES PT FOLLOW UP: Yes  Activity Tolerance  Activity Tolerance: Patient Tolerated treatment well       Patient Diagnosis(es): The primary encounter diagnosis was Nausea vomiting and diarrhea. Diagnoses of Type 2 diabetes mellitus with hyperglycemia, with long-term current use of insulin ([address] ESRD on dialysis ([address] Hypoxia, and Hyperosmolar hyperglycemic state (HHS) ([address] were also pertinent to this visit. has a past medical history of Arthritis, Chronic kidney disease, stage 5, kidney failure ([address] Closed fracture of right shoulder girdle, with routine healing, subsequent encounter, DM (diabetes mellitus) type II controlled with renal manifestation ([address] Gastroparesis, GERD (gastroesophageal reflux disease), Hemodialysis patient ([address] Hypertension, Hypothyroid, Nasal congestion, Neuropathy, Noncompliance with medications, Palliative care patient, and Restless leg syndrome. has a past surgical history that includes Tubal ligation; pr egd transoral biopsy single/multiple (N/A, 2016); Endometrial ablation (); pr colonoscopy w/biopsy single/multiple (N/A, 10/20/2017); pr egd transoral biopsy single/multiple (N/A, 10/20/2017); Dialysis fistula creation (Left, 2019); hc dialysis catheter (Right, 2019);  Cholecystectomy, laparoscopic; liver biopsy (08/17/2018); vascular surgery (Left, 2016); vascular surgery (10/02/2017); vascular surgery (08/2018); vascular surgery (10/29/2018); vascular surgery (12/19/2018); vascular surgery (03/06/2019); vascular surgery (08/28/2019); vascular surgery (08/28/2019); Colonoscopy (Left, 9/17/2020); Breast surgery (Left, 2008); and Breast surgery (Right, 5/25/2021).     Restrictions  Restrictions/Precautions  Restrictions/Precautions: Fall Risk,Isolation,Contact Precautions (VRE)  Required Braces or Orthoses?: No  Vision/Hearing  Vision: Within Functional Limits  Hearing: Within functional limits     Subjective  General  Patient assessed for rehabilitation services?: Yes  Diagnosis: ESRD, HYPERTENSIVE EMERGENCY, WEAKNESS  Subjective  Subjective: Pt VERY PLEASANT, WILLING TO PARTICIPATE  Pain Screening  Patient Currently in Pain: No  Vital Signs  Patient Currently in Pain: No       Orientation  Orientation  Overall Orientation Status: Within Normal Limits  Social/Functional History  Social/Functional History  Lives With: Alone  Type of Home: House  Home Layout: One level  Home Access: Stairs to enter with rails  Entrance Stairs - Number of Steps: 4  Entrance Stairs - Rails: Both  Bathroom Shower/Tub: Walk-in shower  Bathroom Equipment: Shower chair  Bathroom Accessibility: Wheelchair accessible,Accessible  Home Equipment: 4 wheeled walker,Wheelchair-manual (utilized manual w/c in house mainly; cane for stairs; rollator otherwise)  ADL Assistance: Independent  Homemaking Assistance: Independent  Ambulation Assistance:  (RW/cane prn)  Transfer Assistance: Independent  Active : No (d/t eyesight)  Additional Comments: pt reports using w/c in house mostly  Cognition   Cognition  Overall Cognitive Status: WNL    Objective     Observation/Palpation  Posture: Fair    AROM RLE (degrees)  RLE AROM: WFL  AROM LLE (degrees)  LLE AROM : WFL  Strength RLE  Comment: GROSSLY 4/5  Strength LLE  Comment: GROSSLY 4/5        Bed mobility  Supine to Sit: Stand by assistance  Sit to Supine: Stand by assistance  Transfers  Sit to Stand: Minimal Assistance  Stand to sit: Minimal Assistance  Bed to Chair: Minimal assistance  Ambulation 1  Device: Hand-Held Assist  Assistance: Minimal assistance  Quality of Gait: GUARDED, SLIGHTLY UNSTEADY  Gait Deviations: Slow Soraya;Decreased step length  Distance: 15'     Balance  Sitting - Dynamic: Good;-  Standing - Dynamic: [address]  Times per week: AT LEAST 5-6  Current Treatment Recommendations: Strengthening,Balance Training,Functional Mobility Training,Transfer [employer] Devices  Type of devices: Bed alarm in place,Call light within reach    G-Code       OutComes Score                                                  AM-PAC Score             Goals  Short term goals  Time Frame for Short term goals: 14 DAYS  Short term goal 1: BED MOB INDEPENDENT  Short term goal 2: TRANSFERS INDEPENDENT  Short term goal 3: AMB 48' RW SUPERVISION       Therapy Time   Individual Concurrent Group Co-treatment   Time In           Time Out           Minutes                   Venkatesh Shine, PT

## 2021-12-20 NOTE — PROGRESS NOTES
Nephrology ([facility]) Progress Note    Patient:  Brendan Bocanegra  YOB: 1970  Date of Service: 12/20/2021  MRN: [id number]   Acct: [de-identified]   Primary Care Physician: ROMINA Brito  Advance Directive: Full Code  Admit Date: 12/14/2021       Hospital Day: 6  Referring Provider: Gregoria Hernández MD    Patient Seen, Chart, Consults notes, Labs, Radiology studies reviewed. Chief complaint: Increasing shortness of breath/end-stage renal disease. Subjective:  Patient is a 78-year-old woman with a past medical history of hypertension, type 2 diabetes and end-stage renal disease.  She is on chronic maintenance hemodialysis at [facility] on T,T,S.  She was in her regular state of health until recently when she started having nausea recurrent vomiting and diarrhea.  Patient was unable to keep her medications down.  She presented to the emergency room and she was having a hypertensive crisis with severe hyperglycemia (>1000).   She was subsequently admitted to ICU.  Renal service was consulted to manage her ESRD.  Patient is s/p dialysis on 12/17. Hospital course remarkable for hemodialysis treatment with large ultrafiltration and has significant improvement of blood pressure control. Her blood pressure medication has been adjusted. She was now moved to regular floor. This morning she feels well and waiting to go to a nursing home for long-term rehab.     Allergies:  Penicillins, Lamisil advanced [terbinafine], Stadol [butorphanol], and Reglan [metoclopramide]    Medicines:  Current Facility-Administered Medications   Medication Dose Route Frequency Provider Last Rate Last Admin    insulin glargine (LANTUS) injection vial 45 Units  45 Units SubCUTAneous Nightly ROMINA Rueda - CNP   45 Units at 12/19/21 2045    nicotine (NICODERM CQ) 14 MG/24HR 1 patch  1 patch TransDERmal Daily Ephraim Casey MD   1 patch at 12/20/21 3432    minoxidil (LONITEN) tablet 2.5 mg  2.5 mg Oral Daily Libby Domínguez, APRN - CNP   2.5 mg at 12/20/21 0831    ondansetron (ZOFRAN) injection 4 mg  4 mg IntraVENous Q6H PRN Jose Luis Barrientos MD   4 mg at 12/18/21 1603    glucose (GLUTOSE) 40 % oral gel 15 g  15 g Oral PRN Bernie Apodaca MD   15 g at 12/17/21 1654    dextrose 50 % IV solution  12.5 g IntraVENous PRN Bernie Apodaca MD        glucagon (rDNA) injection 1 mg  1 mg IntraMUSCular PRN Bernie Apodaca MD        dextrose 5 % solution  100 mL/hr IntraVENous PRN Bernie Apodaca MD        potassium chloride 10 mEq/100 mL IVPB (Peripheral Line)  10 mEq IntraVENous PRN Sonia Elizondo  mL/hr at 12/14/21 2207 10 mEq at 12/14/21 2207    magnesium sulfate 1000 mg in dextrose 5% 100 mL IVPB  1,000 mg IntraVENous PRN Sonia Elizondo MD        sodium phosphate 10 mmol in dextrose 5 % 250 mL IVPB  10 mmol IntraVENous PRN Sonia Elizondo MD        Or    sodium phosphate 15 mmol in dextrose 5 % 250 mL IVPB  15 mmol IntraVENous PRN Sonia Elizondo MD   Stopped at 12/15/21 0307    Or    sodium phosphate 20 mmol in dextrose 5 % 500 mL IVPB  20 mmol IntraVENous PRN Sonia Elizondo MD        dextrose 5 % and 0.45 % sodium chloride infusion   IntraVENous Continuous PRN Sonia Elizondo MD   Stopped at 12/14/21 1836    cloNIDine (CATAPRES) 0.3 MG/24HR 1 patch  1 patch TransDERmal Weekly Sonia Elizondo MD        hydrALAZINE (APRESOLINE) injection 10 mg  10 mg IntraVENous Q6H PRN Sonia Elizondo MD   10 mg at 12/17/21 1730    labetalol (NORMODYNE;TRANDATE) injection 20 mg  20 mg IntraVENous Q4H PRN Sonia Elizondo MD        DULoxetine (CYMBALTA) extended release capsule 60 mg  60 mg Oral Daily Sonia Elizondo MD   60 mg at 12/20/21 0831    vitamin D (ERGOCALCIFEROL) capsule 50,000 Units  50,000 Units Oral Q14 Days Sonia Elizondo MD   50,000 Units at 12/19/21 1321    hydrALAZINE (APRESOLINE) tablet 100 mg  100 mg Oral 3 times per day Sonia Elizondo MD   100 mg at 12/20/21 0559    hydroCHLOROthiazide (HYDRODIURIL) tablet 50 mg  50 mg Oral Daily Dawson Ahn MD   50 mg at 12/20/21 0830    HYDROcodone-acetaminophen (Harlin Rafael) 5-325 MG per tablet 1 tablet  1 tablet Oral Q6H PRN Dawson Ahn MD   1 tablet at 12/19/21 2142    isosorbide mononitrate (IMDUR) extended release tablet 120 mg  120 mg Oral Daily Dawson Ahn MD   120 mg at 12/20/21 0831    levothyroxine (SYNTHROID) tablet 150 mcg  150 mcg Oral Daily Dawson Ahn MD   150 mcg at 12/20/21 0559    lisinopril (PRINIVIL;ZESTRIL) tablet 20 mg  20 mg Oral BID Dawson Ahn MD   20 mg at 12/20/21 0831    metoprolol succinate (TOPROL XL) extended release tablet 100 mg  100 mg Oral QAM AC Dawson Ahn MD   100 mg at 12/20/21 0559    NIFEdipine (PROCARDIA XL) extended release tablet 90 mg  90 mg Oral Daily Dawson Ahn MD   90 mg at 12/20/21 0831    rOPINIRole (REQUIP) tablet 4 mg  4 mg Oral Nightly Dawson Ahn MD   4 mg at 12/19/21 2045    senna (SENOKOT) tablet 8.6 mg  1 tablet Oral Daily Dawson Ahn MD   8.6 mg at 12/20/21 0831    atorvastatin (LIPITOR) tablet 20 mg  20 mg Oral Daily Dawson Ahn MD   20 mg at 12/20/21 0831    albuterol (PROVENTIL) nebulizer solution 2.5 mg  2.5 mg Nebulization Q4H PRN Dawson Ahn MD        [Held by provider] insulin lispro (HUMALOG) injection vial 10 Units  10 Units SubCUTAneous TID  Dawson Ahn MD        insulin lispro (HUMALOG) injection vial 10 Units  10 Units SubCUTAneous TID [facility] Dawson Ahn MD   10 Units at 12/20/21 0836    insulin lispro (HUMALOG) injection vial 0-6 Units  0-6 Units SubCUTAneous TID  Dawson Ahn MD   1 Units at 12/20/21 1212    insulin lispro (HUMALOG) injection vial 0-3 Units  0-3 Units SubCUTAneous Nightly Dawson Ahn MD   3 Units at 12/19/21 2044       Past Medical History:  Past Medical History:   Diagnosis Date    Arthritis     Chronic kidney disease, stage 5, [facility] failure [facility] [address]     Closed fracture of right shoulder girdle, with routine healing, subsequent encounter     DM (diabetes mellitus) type II controlled with renal manifestation ([facility] [address] 06/1993    Gastroparesis     GERD (gastroesophageal reflux disease)     Hemodialysis patient ([facility] [address]     dialysis on tues, thur, and sat at [facility]    Hypertension     Hypothyroid     Nasal congestion     recent    Neuropathy     Noncompliance with medications     Palliative care patient 10/08/2019    Restless leg syndrome        Past Surgical History:  Past Surgical History:   Procedure Laterality Date    BREAST SURGERY Left 2008    infected milk gland removed    BREAST SURGERY Right 5/25/2021    WEDGE EXCISION RIGHT BREAST DUCT WITH LACRIMAL DUCT PROBE, PEC BLOCK performed by Ashley Abdalla MD at [address] [facility]      2008    COLONOSCOPY Left 9/17/2020    Dr Sabrina Lima hepatic flexure-Severe tortuosity with severe spasming, 1 yr recall    DIALYSIS FISTULA CREATION Left 1/25/2019    REVISION LEFT UPPER EXTREMITY AV ACCESS WITH LEFT BRACHIAL ARTERY TO LEFT AXILLARY VEIN WITH  INTERPOSITIONAL ARTEGRAFT   performed by Felipe Islas MD at [address]  2012    [address] Right 1/25/2019    INSERTION OF RIGHT INTERNAL JUGULAR HEMODIALYSIS CATHETER performed by Felipe Islas MD at [address] [facility]  08/17/2018    1.  Moderately increased stainable iron identified by special stain in Kupffer cells and occasional hepatocytes. Negative for evidence of significant portal or lobular inflammation.  Negative for evidence of significant fibrosis    MD COLONOSCOPY W/BIOPSY SINGLE/MULTIPLE N/A 10/20/2017    Dr Saman Holt prep-Tubular AP (-) dysplasia x 2--3 yr recall    MD EGD TRANSORAL BIOPSY SINGLE/MULTIPLE N/A 12/27/2016    Dr Elinor Ricci EGD TRANSORAL BIOPSY SINGLE/MULTIPLE N/A 10/20/2017    Dr Ortiz Peak (-)   [facility] VASCULAR SURGERY Left 2016    fistula by Dr Fina Cox at [address]  10/02/2017    [facility]. Left upper fistulograms/venograms, balloon angioplasty cephalic vein arch 78H03 conquest.    VASCULAR SURGERY  2018    FISTULOGRAM    VASCULAR SURGERY  10/29/2018    [facility]. Left upper fistulograms/venograms    VASCULAR SURGERY  2018    [facility]. Arch aortogram,left upper arteriograms.  VASCULAR SURGERY  2019    [facility]. Removal of tunneled dialyis catheter right internal jugular vein.  VASCULAR SURGERY  2019    [facility]. Left upper extremity fistulogram including venography to the superior vena cava. Balloon angioplasty left subclavian vein with 10mm x 40mm conquest balloon. Balloon angioplasty mid/proximal upper arm cephalic vein with 46WB x 40mm conquest balloon. Venograms after balloon angioplasty. Stent left mid/proximal upper arm cephalic vein stenosis fluency 10mm x 60mm self-expanding covered stent. Balloon     VASCULAR SURGERY  2019    cont angioplasty stent with 10mm x 40mm conquest balloon. Completion venograms left upper extremity.        Family History  Family History   Problem Relation Age of Onset    Colon Cancer Mother          at 63    Colon Polyps Mother     Lung Cancer Father          at 79    Colon Polyps Father     Stomach Cancer Sister     Breast Cancer Sister 27    Depression Daughter 25        committed suicide    Liver Cancer Neg Hx     Liver Disease Neg Hx     Esophageal Cancer Neg Hx     Rectal Cancer Neg Hx        Social History  Social History     Socioeconomic History    Marital status: Single     Spouse name: Not on file    Number of children: 2    Years of education: Not on file    Highest education level: Not on file   Occupational History    Not on file   Tobacco Use    Smoking status: Current Every Day Smoker     Packs/day: 0.50     Years: 33.00     Pack years: 16.50     Types: Cigarettes    Smokeless tobacco: Never Used    Tobacco comment: NOW at 1/4 PD, started at age 24 and has averaged 2 PPD Vaping Use    Vaping Use: Never used   Substance and Sexual Activity    Alcohol use: No    Drug use: Not Currently     Types: Marijuana Olecindy Hayes)    Sexual activity: Not Currently     Partners: Male   Other Topics Concern    Not on file   Social History Narrative    Not on file     Social Determinants of Health     Financial Resource Strain:     Difficulty of Paying Living Expenses: Not on file   Food Insecurity:     Worried About Running Out of Food in the Last Year: Not on file    Kendy of Food in the Last Year: Not on file   Transportation Needs:     Lack of Transportation (Medical): Not on file    Lack of Transportation (Non-Medical):  Not on file   Physical Activity:     Days of Exercise per Week: Not on file    Minutes of Exercise per Session: Not on file   Stress:     Feeling of Stress : Not on file   Social Connections:     Frequency of Communication with Friends and Family: Not on file    Frequency of Social Gatherings with Friends and Family: Not on file    Attends Tenriism Services: Not on file    Active Member of [address] [place] [address] or Organizations: Not on file    Attends Club or Organization Meetings: Not on file    Marital Status: Not on file   Intimate Partner Violence:     Fear of Current or Ex-Partner: Not on file    Emotionally Abused: Not on file    Physically Abused: Not on file    Sexually Abused: Not on file   Housing Stability:     Unable to Pay for Housing in the Last Year: Not on file    Number of [place] in the Last Year: Not on file    Unstable Housing in the Last Year: Not on file         Review of Systems:  History obtained from chart review and the patient  General ROS: No fever or chills  Respiratory ROS: No cough, shortness of breath, wheezing  Cardiovascular ROS: no chest pain or dyspnea on exertion  Gastrointestinal ROS: No abdominal pain or melena  Genito-Urinary ROS: No dysuria or hematuria  Musculoskeletal ROS: No joint pain or swelling Objective:  Blood pressure (!) 120/58, pulse 59, temperature 97 °F (36.1 °C), temperature source Temporal, resp. rate 18, height 5' 6\" (1.676 m), weight 150 lb (68 kg), SpO2 98 %. Intake/Output Summary (Last 24 hours) at 12/20/2021 1128  Last data filed at 12/20/2021 7184  Gross per 24 hour   Intake 360 ml   Output    Net 360 ml     General: alert and oriented x3   HEENT: Normocephalic atraumatic head  Neck: Supple with no JVD or carotid bruits. Chest:  clear to auscultation bilaterally without respiratory distress  CVS: regular rate and rhythm  Abdominal: soft, nontender, normal bowel sounds  Extremities: no cyanosis or edema  Skin: warm and dry without rash    Labs:  BMP:   Recent Labs     12/18/21  1220 12/19/21  0449 12/20/21  0501   * 132* 129*   K 3.6 5.0 5.1*   CL 95* 93* 91*   CO2 25 25 25   BUN 14 27* 41*   CREATININE 2.5* 4.1* 5.2*   CALCIUM 8.7 8.5* 8.6     CBC:   Recent Labs     12/18/21  0409 12/19/21  0449 12/20/21  0501   WBC 4.7* 4.6* 4.6*   HGB 11.2* 11.7* 11.1*   HCT 36.7* 38.5 36.3*   MCV 91.8 93.9 93.6    148 149     LIVER PROFILE:   Recent Labs     12/18/21  0409 12/19/21  0449 12/20/21  0501   AST 14 16 14   ALT 8 9 7   BILITOT 0.5 0.4 0.3   ALKPHOS 93 92 86     PT/INR: No results for input(s): PROTIME, INR in the last 72 hours. APTT: No results for input(s): APTT in the last 72 hours. BNP:  No results for input(s): BNP in the last 72 hours. Ionized Calcium:No results for input(s): IONCA in the last 72 hours. Magnesium:  Recent Labs     12/18/21  0409 12/19/21  0449 12/20/21  0501   MG 2.5 2.3 2.5     Phosphorus:  No results for input(s): PHOS in the last 72 hours. HgbA1C:   No results for input(s): LABA1C in the last 72 hours.   Hepatic:   Recent Labs     12/18/21  0409 12/19/21  0449 12/20/21  0501   ALKPHOS 93 92 86   ALT 8 9 7   AST 14 16 14   PROT 5.8* 6.1* 6.0*   BILITOT 0.5 0.4 0.3   LABALBU 3.3* 3.7 3.5     Lactic Acid:   No results for input(s): LACTA in the last 72 hours. Troponin: No results for input(s): CKTOTAL, CKMB, TROPONINT in the last 72 hours. ABGs: No results for input(s): PH, PCO2, PO2, HCO3, O2SAT in the last 72 hours. CRP:  No results for input(s): CRP in the last 72 hours. Sed Rate:  No results for input(s): SEDRATE in the last 72 hours. Cultures:   No results for input(s): CULTURE in the last 72 hours. Radiology reports as per the Radiologist  Radiology: XR CHEST PORTABLE    Result Date: 12/14/2021  Examination. XR CHEST PORTABLE 12/14/2021 7:59 AM History: Fever. A frontal portable upright view of the chest is obtained and compared with the previous study dated 11/18/2021. There is moderate improvement in pulmonary vascular congestion. There is a small right basal pleural effusion. There is no pneumothorax. There is persistent moderate cardiomegaly. There is a left subclavian vascular stent in place similar to the previous study. There is moderate diffuse osteopenia. No acute bony abnormality. 1. Moderate improvement in pulmonary vascular congestion since the previous study. 2. A small right basal pleural effusion. 3. A persistent moderate cardiomegaly. 4. Moderate diffuse osteopenia. Signed by Dr Reed Fuel     1. End stage renal disease on maintenance dialysis. 2.  Type II diabetic nephropathy. 3.  Poorly controlled type 2 diabetes. 4.  Severe systolic and diastolic hypertension/improved. 5.  Anemia of chronic kidney disease. 6.  Poor compliance. 7.  Active tobacco use. 8.  Metabolic acidemia and hyperkalemia improved    Plan:  1. Routine dialysis tomorrow. 2.  May go home to nursing home today if needed.   3.  Counseling about compliance issues

## 2021-12-20 NOTE — CARE COORDINATION
CM met with patient at the bedside. Pt is wanting to go to SNF. Pt has been seen by therapy.  Geo Ochoa started today  Electronically signed by Isis Keating RN on 12/20/2021 at 5:46 PM

## 2021-12-20 NOTE — PROGRESS NOTES
[facility]      Patient:  Meenakshi Moss  YOB: 1970  Date of Service: 12/20/2021  MRN: [id number]   Acct: [de-identified]   Primary Care Physician: ROMINA Rogers  Advance Directive: Full Code  Admit Date: 12/14/2021       Hospital Day: 6  Portions of this note have been copied forward, however, changed to reflect the most current clinical status of this patient. CHIEF COMPLAINT nausea and vomiting    SUBJECTIVE:  Denies acute changes overnight. CUMULATIVE HOSPITAL COURSE:  Patient is a 39-year-old female with past medical history ESRD on dialysis, hypertension, diabetes, gastroparesis, GERD, and restless leg syndrome who presented to [facility] ED with complaints of nausea, vomiting, and diarrhea x3 days. Ms. Venus Tapia reported nonbloody vomiting and diarrhea, fatigue, and weakness. She reported she had not been able to keep her medications down. Work-up in the ED revealed elevated glucose and hypertensive urgency. Patient was admitted to the ICU for Cardene and insulin drips. Patient underwent IV fluid resuscitation. Medications were transitioned to oral and subcutaneous insulin and she was transferred out of the ICU on 12/15/2021. Oral medications were resumed and clonidine patch was resumed as well. Patient continued to have blood pressures with systolics over 423. Minoxidil initiated and indicated excellent control of hypertension. Glucose being controlled with subcu insulin at this time, basal insulin increased. Patient accepted to SNF pending precert. Review of Systems:   Review of Systems   Constitutional: Negative for activity change, chills, fatigue and fever. HENT: Negative for congestion, mouth sores and sore throat. \"bad teeth\"     Respiratory: Negative for chest tightness and shortness of breath. Cardiovascular: Negative for chest pain, palpitations and leg swelling. Gastrointestinal: Negative for abdominal pain, constipation, diarrhea, nausea and vomiting. Genitourinary: Negative for difficulty urinating, flank pain, frequency and urgency. Musculoskeletal: Negative for arthralgias and myalgias. Skin: Negative for color change and wound. Neurological: Negative for dizziness, light-headedness and headaches. Hematological: Negative for adenopathy. Psychiatric/Behavioral: Negative for agitation and confusion. 14 point review of systems is negative except as specifically addressed above. Objective:   VITALS:  BP (!) 121/52   Pulse 70   Temp 97 °F (36.1 °C) (Temporal)   Resp 18   Ht 5' 6\" (1.676 m)   Wt 150 lb (68 kg)   SpO2 98%   BMI 24.21 kg/m²   24HR INTAKE/OUTPUT:      Intake/Output Summary (Last 24 hours) at 12/20/2021 1646  Last data filed at 12/20/2021 1341  Gross per 24 hour   Intake 360 ml   Output    Net 360 ml       Physical Exam  Vitals and nursing note reviewed. Constitutional:       Appearance: She is ill-appearing (Chronically). Comments: Appears much older than stated age   HENT:      Head: Normocephalic. Mouth/Throat:      Mouth: Mucous membranes are moist.      Comments: Multiple missing and broken teeth  Eyes:      Conjunctiva/sclera: Conjunctivae normal.      Pupils: Pupils are equal, round, and reactive to light. Cardiovascular:      Rate and Rhythm: Normal rate and regular rhythm. Pulses: Normal pulses. Heart sounds: Normal heart sounds. Pulmonary:      Effort: Pulmonary effort is normal.      Breath sounds: Normal breath sounds. Abdominal:      General: Abdomen is flat. Bowel sounds are normal. There is no distension. Palpations: Abdomen is soft. Tenderness: There is no abdominal tenderness. There is no guarding. Musculoskeletal:         General: Normal range of motion. Cervical back: Normal range of motion and neck supple. Right lower leg: No edema. Left lower leg: No edema. Skin:     General: Skin is warm and dry.       Capillary Refill: Capillary refill takes less than 2 seconds. Neurological:      General: No focal deficit present. Mental Status: She is alert and oriented to person, place, and time. Psychiatric:         Mood and Affect: Mood normal.         Behavior: Behavior normal.         Thought Content: Thought content normal.         Judgment: Judgment normal.             Medications:      dextrose      dextrose 5 % and 0.45 % NaCl Stopped (12/14/21 1836)      insulin glargine  45 Units SubCUTAneous Nightly    nicotine  1 patch TransDERmal Daily    minoxidil  2.5 mg Oral Daily    cloNIDine  1 patch TransDERmal Weekly    DULoxetine  60 mg Oral Daily    vitamin D  50,000 Units Oral Q14 Days    hydrALAZINE  100 mg Oral 3 times per day    hydroCHLOROthiazide  50 mg Oral Daily    isosorbide mononitrate  120 mg Oral Daily    levothyroxine  150 mcg Oral Daily    lisinopril  20 mg Oral BID    metoprolol succinate  100 mg Oral QAM AC    NIFEdipine  90 mg Oral Daily    rOPINIRole  4 mg Oral Nightly    senna  1 tablet Oral Daily    atorvastatin  20 mg Oral Daily    [Held by provider] insulin lispro  10 Units SubCUTAneous TID WC    insulin lispro  10 Units SubCUTAneous TID AC    insulin lispro  0-6 Units SubCUTAneous TID WC    insulin lispro  0-3 Units SubCUTAneous Nightly     ondansetron, glucose, dextrose, glucagon (rDNA), dextrose, potassium chloride, magnesium sulfate, sodium phosphate IVPB **OR** sodium phosphate IVPB **OR** sodium phosphate IVPB, dextrose 5 % and 0.45 % NaCl, hydrALAZINE, labetalol, HYDROcodone-acetaminophen, albuterol  ADULT DIET; Easy to Chew; 3 carb choices (45 gm/meal);  Low Fat/Low Chol/High Fiber/2 gm Na     Lab and other Data:     Recent Labs     12/18/21  0409 12/19/21  0449 12/20/21  0501   WBC 4.7* 4.6* 4.6*   HGB 11.2* 11.7* 11.1*    148 149     Recent Labs     12/18/21  1220 12/19/21  0449 12/20/21  0501   * 132* 129*   K 3.6 5.0 5.1*   CL 95* 93* 91*   CO2 25 25 25   BUN 14 27* 41*   CREATININE 2. 5* 4.1* 5.2*   GLUCOSE 219* 386* 235*     Recent Labs     12/18/21  0409 12/19/21  0449 12/20/21  0501   AST 14 16 14   ALT 8 9 7   BILITOT 0.5 0.4 0.3   ALKPHOS 93 92 86     Troponin T: No results for input(s): TROPONINI in the last 72 hours. Pro-BNP: No results for input(s): BNP in the last 72 hours. INR: No results for input(s): INR in the last 72 hours. UA:No results for input(s): NITRITE, COLORU, PHUR, LABCAST, WBCUA, RBCUA, MUCUS, TRICHOMONAS, YEAST, BACTERIA, CLARITYU, SPECGRAV, LEUKOCYTESUR, UROBILINOGEN, BILIRUBINUR, BLOODU, GLUCOSEU, AMORPHOUS in the last 72 hours. Invalid input(s): Galilea Blood  A1C: No results for input(s): LABA1C in the last 72 hours. ABG:No results for input(s): PHART, [id number], PO2ART, UWR7XCP, BEART, HGBAE, [id number], CARBOXHGBART in the last 72 hours. RAD:   XR CHEST PORTABLE    Result Date: 12/14/2021  1. Moderate improvement in pulmonary vascular congestion since the previous study. 2. A small right basal pleural effusion. 3. A persistent moderate cardiomegaly. 4. Moderate diffuse osteopenia.  Signed by Dr Brad Figueroa           Assessment/Plan   Hyperosmolar hyperglycemic state (HHS) [facility]) / Type 2 diabetes mellitus with chronic kidney disease on chronic dialysis, with long-term current use of insulin (HCC)              -Transitioned off of insulin drip              -Basal increased   -continue prandial insulin              -Sliding scale insulin              -Accu-Cheks              -Hypoglycemia protocol in place              -DM teaching     Active Problems:  ESRD (end stage renal disease) on dialysis [facility])              -Nephrology managing dialysis care       Hypertensive emergency              -Transitioned off Cardene drip              -Oral medications resumed              -Minoxidil added, much better control              -Every 4 vital signs       Generalized weakness              --noted   -accepted to SNF pending precert       DVT Prophylaxis: cristy Ivey, APRN - CNP, 12/20/2021 4:46 PM

## 2021-12-20 NOTE — PLAN OF CARE
Problem: Skin Integrity:  Goal: Will show no infection signs and symptoms  Description: Will show no infection signs and symptoms  12/20/2021 1410 by Jovanna Tolbert RN  Outcome: Ongoing  12/20/2021 0332 by Tra Rico RN  Outcome: Ongoing  Goal: Absence of new skin breakdown  Description: Absence of new skin breakdown  12/20/2021 1410 by Jovanna Tolbert RN  Outcome: Ongoing  12/20/2021 0332 by Tra Rico RN  Outcome: Ongoing  Goal: Risk for impaired skin integrity will decrease  Description: Risk for impaired skin integrity will decrease  12/20/2021 1410 by Jovanna Tolbert RN  Outcome: Ongoing  12/20/2021 0332 by Tra Rico RN  Outcome: Ongoing     Problem: Falls - Risk of:  Goal: Will remain free from falls  Description: Will remain free from falls  12/20/2021 1410 by Jovanna Tolbert RN  Outcome: Ongoing  12/20/2021 0332 by Tra Rico RN  Outcome: Ongoing  Goal: Absence of physical injury  Description: Absence of physical injury  12/20/2021 1410 by Jovanna Tolbert RN  Outcome: Ongoing  12/20/2021 0332 by Tra Rico RN  Outcome: Ongoing     Problem: Activity:  Goal: Risk for activity intolerance will decrease  Description: Risk for activity intolerance will decrease  12/20/2021 1410 by Jovanna Tolbert RN  Outcome: Ongoing  12/20/2021 0332 by Tra Rcio RN  Outcome: Ongoing  Goal: Ability to tolerate increased activity will improve  Description: Ability to tolerate increased activity will improve  12/20/2021 1410 by Jovanna Tolbert RN  Outcome: Ongoing  12/20/2021 0332 by Tra Rico RN  Outcome: Ongoing     Problem:  Bowel/Gastric:  Goal: Bowel function will improve  Description: Bowel function will improve  12/20/2021 1410 by Jovanna Tolbert RN  Outcome: Ongoing  12/20/2021 0332 by Tra Rico RN  Outcome: Ongoing  Goal: Diagnostic test results will improve  Description: Diagnostic test results will improve  12/20/2021 1410 by Jovanna Tolbert RN  Outcome: Ongoing  12/20/2021 0332 by Tra Rico RN  Outcome: Ongoing  Goal: Occurrences of nausea will decrease  Description: Occurrences of nausea will decrease  12/20/2021 1410 by Alfonso Rendon RN  Outcome: Ongoing  12/20/2021 0332 by Deedee Curiel RN  Outcome: Ongoing  Goal: Occurrences of vomiting will decrease  Description: Occurrences of vomiting will decrease  12/20/2021 1410 by Alfonso Rendon RN  Outcome: Ongoing  12/20/2021 0332 by Deedee Curiel RN  Outcome: Ongoing     Problem: Fluid Volume:  Goal: Maintenance of adequate hydration will improve  Description: Maintenance of adequate hydration will improve  12/20/2021 1410 by Alfonso Rendon RN  Outcome: Ongoing  12/20/2021 0332 by Deedee Curiel RN  Outcome: Ongoing  Goal: Ability to maintain a balanced intake and output will improve  Description: Ability to maintain a balanced intake and output will improve  12/20/2021 1410 by Alfonso Rendon RN  Outcome: Ongoing  12/20/2021 0332 by Deedee Curiel RN  Outcome: Ongoing     Problem: Health Behavior:  Goal: Ability to state signs and symptoms to report to health care provider will improve  Description: Ability to state signs and symptoms to report to health care provider will improve  12/20/2021 1410 by Alfonso Rendon RN  Outcome: Ongoing  12/20/2021 0332 by Deedee Curiel RN  Outcome: Ongoing  Goal: Ability to identify and utilize available resources and services will improve  Description: Ability to identify and utilize available resources and services will improve  12/20/2021 1410 by Alfonso Rendon RN  Outcome: Ongoing  12/20/2021 0332 by Deedee Curiel RN  Outcome: Ongoing  Goal: Ability to manage health-related needs will improve  Description: Ability to manage health-related needs will improve  12/20/2021 1410 by Alfonso Rendon RN  Outcome: Ongoing  12/20/2021 0332 by Deedee Curiel RN  Outcome: Ongoing  Goal: Identification of resources available to assist in meeting health care needs will improve  Description: Identification of resources available to assist in meeting health care needs will improve  12/20/2021 1410 by Moncho Gomez RN  Outcome: Ongoing  12/20/2021 0332 by Audrey Escalante RN  Outcome: Ongoing     Problem: Physical Regulation:  Goal: Diagnostic test results will improve  Description: Diagnostic test results will improve  12/20/2021 1410 by Moncho Gomez RN  Outcome: Ongoing  12/20/2021 0332 by Audrey Escalante RN  Outcome: Ongoing  Goal: Complications related to the disease process, condition or treatment will be avoided or minimized  Description: Complications related to the disease process, condition or treatment will be avoided or minimized  12/20/2021 1410 by Moncho Gomez RN  Outcome: Ongoing  12/20/2021 0332 by Audrey Escalante RN  Outcome: Ongoing  Goal: Ability to maintain clinical measurements within normal limits will improve  Description: Ability to maintain clinical measurements within normal limits will improve  12/20/2021 1410 by Moncho Gomez RN  Outcome: Ongoing  12/20/2021 0332 by Audrey Escalante RN  Outcome: Ongoing     Problem: Sensory:  Goal: Ability to identify factors that increase the pain will improve  Description: Ability to identify factors that increase the pain will improve  12/20/2021 1410 by Moncho Gomez RN  Outcome: Ongoing  12/20/2021 0332 by Audrey Escalante RN  Outcome: Ongoing  Goal: Ability to notify healthcare provider of pain before it becomes unmanageable or unbearable will improve  Description: Ability to notify healthcare provider of pain before it becomes unmanageable or unbearable will improve  12/20/2021 1410 by Moncho Gomez RN  Outcome: Ongoing  12/20/2021 0332 by Audrey Escalante RN  Outcome: Ongoing  Goal: Pain level will decrease  Description: Pain level will decrease  12/20/2021 1410 by Moncho Gomez RN  Outcome: Ongoing  12/20/2021 0332 by Audrey Escalante RN  Outcome: Ongoing     Problem: Coping:  Goal: Ability to adjust to condition or change in health will improve  Description: Ability to adjust to condition or change in health will improve  12/20/2021 1410 by Ascencion Rasmussen RN  Outcome: Ongoing  12/20/2021 0332 by Anjel Olivera RN  Outcome: Ongoing  Goal: Ability to identify and develop effective coping behavior will improve  Description: Ability to identify and develop effective coping behavior will improve  12/20/2021 1410 by Ascencion Rasmussen RN  Outcome: Ongoing  12/20/2021 0332 by Anjel Olivera RN  Outcome: Ongoing     Problem: Metabolic:  Goal: Ability to maintain appropriate glucose levels will improve  Description: Ability to maintain appropriate glucose levels will improve  12/20/2021 1410 by Ascencion Rasmussen RN  Outcome: Ongoing  12/20/2021 0332 by Anjel Olivera RN  Outcome: Ongoing     Problem: Nutritional:  Goal: Maintenance of adequate nutrition will improve  Description: Maintenance of adequate nutrition will improve  12/20/2021 1410 by Ascencion Rasmussen RN  Outcome: Ongoing  12/20/2021 0332 by Anjel Olivera RN  Outcome: Ongoing  Goal: Progress toward achieving an optimal weight will improve  Description: Progress toward achieving an optimal weight will improve  12/20/2021 1410 by Ascencion Rasmussen RN  Outcome: Ongoing  12/20/2021 0332 by Anjel Olivera RN  Outcome: Ongoing  Goal: Ability to identify appropriate dietary choices will improve  Description: Ability to identify appropriate dietary choices will improve  12/20/2021 1410 by Ascencion Rasmussen RN  Outcome: Ongoing  12/20/2021 0332 by Anjel Olivera RN  Outcome: Ongoing     Problem: Tissue Perfusion:  Goal: Adequacy of tissue perfusion will improve  Description: Adequacy of tissue perfusion will improve  12/20/2021 1410 by Ascencion Rasmussen RN  Outcome: Ongoing  12/20/2021 0332 by Anjel Olivera RN  Outcome: Ongoing     Problem: Cardiac:  Goal: Complications related to the disease process, condition or treatment will be avoided or minimized  Description: Complications related to the disease process, condition or treatment will be avoided or minimized  12/20/2021 1410 by Ascencion Rasmussen RN  Outcome: Ongoing  12/20/2021 0332 by Rudolph Lock RN  Outcome: Ongoing  Goal: Hemodynamic stability will improve  Description: Hemodynamic stability will improve  12/20/2021 1410 by Sangita Wilburn RN  Outcome: Ongoing  12/20/2021 0332 by Rudolph Lock RN  Outcome: Ongoing  Goal: Cerebral tissue perfusion will improve  Description: Cerebral tissue perfusion will improve  12/20/2021 1410 by Sangita Wilburn RN  Outcome: Ongoing  12/20/2021 0332 by Rudolph Lock RN  Outcome: Ongoing   Electronically signed by Sangita Wilburn RN on 12/20/2021 at 2:10 PM

## 2021-12-20 NOTE — PROGRESS NOTES
Occupational Therapy Initial Assessment  Date: 2021   Patient Name: Nora Vaughn  MRN: [id number]     : 1970    Date of Service: 2021    Discharge Recommendations:  Subacute/Skilled Nursing Facility       Assessment   Assessment: OT evaluation completed and tx initiated. Pt may benefit from continued skilled services to address functional deficits. Pt will likely progress with further tx. OT recommends D/C to facility that can provide 24/7 support; d/t fluctuating condition (blood sugars) as well as balance difficulties. Tentative D/C to SNF per chart review. OT agrees with plan. OT Education: Precautions; ADL Adaptive Strategies; Equipment;Transfer Training  REQUIRES OT FOLLOW UP: Yes  Activity Tolerance  Activity Tolerance: Patient Tolerated treatment well  Safety Devices  Safety Devices in place: Yes  Type of devices: Bed alarm in place;Call light within reach; Left in bed;Nurse notified           Patient Diagnosis(es): The primary encounter diagnosis was Nausea vomiting and diarrhea. Diagnoses of Type 2 diabetes mellitus with hyperglycemia, with long-term current use of insulin ([address] ESRD on dialysis ([address] Hypoxia, and Hyperosmolar hyperglycemic state (HHS) ([address] were also pertinent to this visit. has a past medical history of Arthritis, Chronic kidney disease, stage 5, kidney failure ([address] Closed fracture of right shoulder girdle, with routine healing, subsequent encounter, DM (diabetes mellitus) type II controlled with renal manifestation ([address] Gastroparesis, GERD (gastroesophageal reflux disease), Hemodialysis patient ([address] Hypertension, Hypothyroid, Nasal congestion, Neuropathy, Noncompliance with medications, Palliative care patient, and Restless leg syndrome. has a past surgical history that includes Tubal ligation; pr egd transoral biopsy single/multiple (N/A, 2016);  Endometrial ablation (); pr colonoscopy w/biopsy single/multiple (N/A, 10/20/2017); pr egd transoral biopsy single/multiple (N/A, 10/20/2017); Dialysis fistula creation (Left, 1/25/2019); hc dialysis catheter (Right, 1/25/2019); Cholecystectomy, laparoscopic; liver biopsy (08/17/2018); vascular surgery (Left, 2016); vascular surgery (10/02/2017); vascular surgery (08/2018); vascular surgery (10/29/2018); vascular surgery (12/19/2018); vascular surgery (03/06/2019); vascular surgery (08/28/2019); vascular surgery (08/28/2019); Colonoscopy (Left, 9/17/2020); Breast surgery (Left, 2008); and Breast surgery (Right, 5/25/2021).            Restrictions  Restrictions/Precautions  Restrictions/Precautions: Fall Risk,Isolation,Contact Precautions (VRE)  Required Braces or Orthoses?: No    Subjective   General  Chart Reviewed: Yes  Patient assessed for rehabilitation services?: Yes  Family / Caregiver Present: No  Diagnosis: Hyperosmolar hyperglycemic state  Patient Currently in Pain: No  Vital Signs  Patient Currently in Pain: No    Social/Functional History  Social/Functional History  Lives With: Alone  Type of Home: House  Home Layout: One level  Home Access: Stairs to enter with rails  Entrance Stairs - Number of Steps: 4  Entrance Stairs - Rails: Both  Bathroom Shower/Tub: Walk-in shower  Bathroom Equipment: Shower chair  Bathroom Accessibility: Wheelchair accessible,Accessible  Home Equipment: 4 wheeled walker,Wheelchair-manual (utilized manual w/c in house mainly; cane for stairs; rollator otherwise)  ADL Assistance: [address] [place] [address] Avenue: Independent  Ambulation Assistance:  (RW/cane prn)  Transfer Assistance: Independent  Active : No (d/t eyesight)  Additional Comments: pt reports using w/c in house mostly       Objective   Vision: Within Functional Limits  Vision Exceptions:  (wears glasses PRN based upon symptoms)  Hearing: Within functional limits    Orientation  Overall Orientation Status: Within Functional Limits  Observation/Palpation  Posture: Fair  Balance  Sitting Balance: Supervision  Standing Balance: Contact guard assistance  Standing Balance  Activity: Did dynamic bending/reaching drills at EOB to simulate for dressing/toileting. Functional Mobility  Functional - Mobility Device: 4-Wheeled Walker  Assist Level: Contact guard assistance  Functional Mobility Comments: few steps from [facility] Transfers  Toilet - Technique: Stand step; Ambulating  Equipment Used: Raised toilet seat with rails  Toilet Transfer: Contact guard assistance (with [id number])  ADL  Feeding: Independent;Setup  UE Bathing: Supervision  LE Bathing: Moderate assistance (for standing components; uses chair at baseline)  UE Dressing: Supervision  LE Dressing: Minimal assistance  Toileting: Contact guard assistance;Minimal assistance  Additional Comments: Currently fluctuations (on top of balance issues), would suggest baseline supervision to monitor for med changes. Coordination  Movements Are Fluid And Coordinated: No  Coordination and Movement description: Fine motor impairments;Decreased speed;Decreased accuracy; Right UE;Left UE     Bed mobility  Supine to Sit: Stand by assistance  Sit to Supine: Supervision;Stand by assistance  Scooting: Stand by assistance  Transfers  Stand Step Transfers: Contact guard assistance  Sit to stand: Contact guard assistance  Stand to sit: Contact guard assistance  Transfer Comments: with 4WW     Cognition  Overall Cognitive Status: WFL        Sensation  Overall Sensation Status: Impaired (BLE sensation impaired; LUE sensation impaired elboew down; R hand tips of digits 2 & 3)        LUE AROM (degrees)  LUE AROM : WFL  RUE AROM (degrees)  RUE AROM : WFL  LUE Strength  L Hand General: 4/5  LUE Strength Comment: Grossly 4-/5; quickly fatigues and would not be at a \"functional\" level for most daily act. RUE Strength  R Hand General: 4/5  RUE Strength Comment: Grossly 4-/5; quickly fatigues and would not be at a \"functional\" level for most daily act.               Included Treatment  Tx consisted of: bed mobility; pt. education; transfer training; DME/AE training; activity tolerance/balance challenges; positioning; dressing simulation; and toileting skills. (Treatment time: 15 min)     Plan   Plan  Times per week: 3-5  Current Treatment Recommendations: Strengthening,Wheelchair Mobility Training,Pain Management,Positioning,ROM,Safety Education & Training,Balance Training,Patient/Caregiver Education & Training,Self-Care / ADL,Cognitive/Perceptual Training,Functional Mobility Training,Equipment Evaluation, Education, & procurement,Home Management Training,Endurance Training    Goals  Short term goals  Time Frame for Short term goals: 1 week  Short term goal 1: Complete light, ambulatory distances with supervision and DME. Short term goal 2: Complete transfers with supervision and DME. Short term goal 3: Perform full body dressing with SBA for standing components and DME. Short term goal 4: Complete toileting skills with SBA and DME. Long term goals  Long term goal 1: Return to PLOF.                MARIANELA Rivera/L  Electronically signed by Jason BEAR/L on 12/20/2021 at 12:18 PM.

## 2021-12-21 PROBLEM — N39.0 UTI (URINARY TRACT INFECTION): Status: RESOLVED | Noted: 2021-11-21 | Resolved: 2021-12-21

## 2021-12-21 LAB
ALBUMIN SERPL-MCNC: 3.4 G/DL (ref 3.5–5.2)
ALP BLD-CCNC: 85 U/L (ref 35–104)
ALT SERPL-CCNC: 9 U/L (ref 5–33)
ANION GAP SERPL CALCULATED.3IONS-SCNC: 15 MMOL/L (ref 7–19)
AST SERPL-CCNC: 17 U/L (ref 5–32)
BASOPHILS ABSOLUTE: 0.1 K/UL (ref 0–0.2)
BASOPHILS RELATIVE PERCENT: 2.1 % (ref 0–1)
BILIRUB SERPL-MCNC: 0.3 MG/DL (ref 0.2–1.2)
BUN BLDV-MCNC: 48 MG/DL (ref 6–20)
CALCIUM SERPL-MCNC: 8.7 MG/DL (ref 8.6–10)
CHLORIDE BLD-SCNC: 91 MMOL/L (ref 98–111)
CO2: 24 MMOL/L (ref 22–29)
CREAT SERPL-MCNC: 6.5 MG/DL (ref 0.5–0.9)
EOSINOPHILS ABSOLUTE: 0.3 K/UL (ref 0–0.6)
EOSINOPHILS RELATIVE PERCENT: 5.5 % (ref 0–5)
GFR AFRICAN AMERICAN: 8
GFR NON-AFRICAN AMERICAN: 7
GLUCOSE BLD-MCNC: 121 MG/DL (ref 70–99)
GLUCOSE BLD-MCNC: 131 MG/DL (ref 70–99)
GLUCOSE BLD-MCNC: 181 MG/DL (ref 70–99)
GLUCOSE BLD-MCNC: 89 MG/DL (ref 70–99)
GLUCOSE BLD-MCNC: 97 MG/DL (ref 70–99)
GLUCOSE BLD-MCNC: 97 MG/DL (ref 74–109)
HCT VFR BLD CALC: 35.6 % (ref 37–47)
HEMOGLOBIN: 11.5 G/DL (ref 12–16)
IMMATURE GRANULOCYTES #: 0 K/UL
LYMPHOCYTES ABSOLUTE: 1.6 K/UL (ref 1.1–4.5)
LYMPHOCYTES RELATIVE PERCENT: 33.2 % (ref 20–40)
MAGNESIUM: 2.6 MG/DL (ref 1.6–2.6)
MCH RBC QN AUTO: 29.1 PG (ref 27–31)
MCHC RBC AUTO-ENTMCNC: 32.3 G/DL (ref 33–37)
MCV RBC AUTO: 90.1 FL (ref 81–99)
MONOCYTES ABSOLUTE: 0.5 K/UL (ref 0–0.9)
MONOCYTES RELATIVE PERCENT: 9.5 % (ref 0–10)
NEUTROPHILS ABSOLUTE: 2.4 K/UL (ref 1.5–7.5)
NEUTROPHILS RELATIVE PERCENT: 49.5 % (ref 50–65)
PDW BLD-RTO: 14.7 % (ref 11.5–14.5)
PERFORMED ON: ABNORMAL
PERFORMED ON: NORMAL
PERFORMED ON: NORMAL
PLATELET # BLD: 164 K/UL (ref 130–400)
PMV BLD AUTO: 9.5 FL (ref 9.4–12.3)
POTASSIUM SERPL-SCNC: 5.4 MMOL/L (ref 3.5–5)
RBC # BLD: 3.95 M/UL (ref 4.2–5.4)
SODIUM BLD-SCNC: 130 MMOL/L (ref 136–145)
TOTAL PROTEIN: 5.8 G/DL (ref 6.6–8.7)
WBC # BLD: 4.8 K/UL (ref 4.8–10.8)

## 2021-12-21 PROCEDURE — 83735 ASSAY OF MAGNESIUM: CPT

## 2021-12-21 PROCEDURE — 6370000000 HC RX 637 (ALT 250 FOR IP): Performed by: HOSPITALIST

## 2021-12-21 PROCEDURE — 6370000000 HC RX 637 (ALT 250 FOR IP): Performed by: NURSE PRACTITIONER

## 2021-12-21 PROCEDURE — 85025 COMPLETE CBC W/AUTO DIFF WBC: CPT

## 2021-12-21 PROCEDURE — 82947 ASSAY GLUCOSE BLOOD QUANT: CPT

## 2021-12-21 PROCEDURE — 8010000000 HC HEMODIALYSIS ACUTE INPT

## 2021-12-21 PROCEDURE — 80053 COMPREHEN METABOLIC PANEL: CPT

## 2021-12-21 PROCEDURE — 6360000002 HC RX W HCPCS: Performed by: INTERNAL MEDICINE

## 2021-12-21 PROCEDURE — 1210000000 HC MED SURG R&B

## 2021-12-21 PROCEDURE — 6370000000 HC RX 637 (ALT 250 FOR IP): Performed by: INTERNAL MEDICINE

## 2021-12-21 RX ORDER — CEFUROXIME AXETIL 250 MG/1
250 TABLET ORAL EVERY 24 HOURS
Status: DISCONTINUED | OUTPATIENT
Start: 2021-12-21 | End: 2021-12-22 | Stop reason: HOSPADM

## 2021-12-21 RX ORDER — METRONIDAZOLE 500 MG/1
500 TABLET ORAL EVERY 8 HOURS SCHEDULED
Status: DISCONTINUED | OUTPATIENT
Start: 2021-12-21 | End: 2021-12-22 | Stop reason: HOSPADM

## 2021-12-21 RX ORDER — CEFUROXIME AXETIL 250 MG/1
500 TABLET ORAL EVERY 12 HOURS SCHEDULED
Status: DISCONTINUED | OUTPATIENT
Start: 2021-12-21 | End: 2021-12-21 | Stop reason: DRUGHIGH

## 2021-12-21 RX ORDER — PROCHLORPERAZINE EDISYLATE 5 MG/ML
5 INJECTION INTRAMUSCULAR; INTRAVENOUS EVERY 6 HOURS PRN
Status: DISCONTINUED | OUTPATIENT
Start: 2021-12-21 | End: 2021-12-22 | Stop reason: HOSPADM

## 2021-12-21 RX ORDER — CLONIDINE 0.1 MG/24H
1 PATCH, EXTENDED RELEASE TRANSDERMAL WEEKLY
Status: DISCONTINUED | OUTPATIENT
Start: 2021-12-21 | End: 2021-12-22 | Stop reason: HOSPADM

## 2021-12-21 RX ADMIN — LEVOTHYROXINE SODIUM 150 MCG: 75 TABLET ORAL at 05:58

## 2021-12-21 RX ADMIN — HYDROCODONE BITARTRATE AND ACETAMINOPHEN 1 TABLET: 5; 325 TABLET ORAL at 12:20

## 2021-12-21 RX ADMIN — MINOXIDIL 2.5 MG: 2.5 TABLET ORAL at 12:28

## 2021-12-21 RX ADMIN — ONDANSETRON 4 MG: 2 INJECTION INTRAMUSCULAR; INTRAVENOUS at 12:42

## 2021-12-21 RX ADMIN — HYDRALAZINE HYDROCHLORIDE 100 MG: 50 TABLET, FILM COATED ORAL at 20:04

## 2021-12-21 RX ADMIN — LISINOPRIL 20 MG: 10 TABLET ORAL at 12:22

## 2021-12-21 RX ADMIN — PROCHLORPERAZINE EDISYLATE 5 MG: 5 INJECTION INTRAMUSCULAR; INTRAVENOUS at 18:13

## 2021-12-21 RX ADMIN — METRONIDAZOLE 500 MG: 500 TABLET ORAL at 20:04

## 2021-12-21 RX ADMIN — ROPINIROLE HYDROCHLORIDE 4 MG: 4 TABLET, FILM COATED ORAL at 20:04

## 2021-12-21 RX ADMIN — SENNOSIDES 8.6 MG: 8.6 TABLET, FILM COATED ORAL at 12:21

## 2021-12-21 RX ADMIN — HYDRALAZINE HYDROCHLORIDE 100 MG: 50 TABLET, FILM COATED ORAL at 15:58

## 2021-12-21 RX ADMIN — ISOSORBIDE MONONITRATE 120 MG: 60 TABLET, EXTENDED RELEASE ORAL at 12:21

## 2021-12-21 RX ADMIN — HYDROCODONE BITARTRATE AND ACETAMINOPHEN 1 TABLET: 5; 325 TABLET ORAL at 23:20

## 2021-12-21 RX ADMIN — ATORVASTATIN CALCIUM 20 MG: 40 TABLET, FILM COATED ORAL at 12:21

## 2021-12-21 RX ADMIN — DULOXETINE 60 MG: 60 CAPSULE, DELAYED RELEASE ORAL at 12:22

## 2021-12-21 RX ADMIN — NIFEDIPINE 90 MG: 30 TABLET, EXTENDED RELEASE ORAL at 12:21

## 2021-12-21 RX ADMIN — CEFUROXIME AXETIL 250 MG: 250 TABLET ORAL at 18:14

## 2021-12-21 RX ADMIN — HYDROCHLOROTHIAZIDE 50 MG: 25 TABLET ORAL at 12:22

## 2021-12-21 RX ADMIN — LISINOPRIL 20 MG: 10 TABLET ORAL at 20:04

## 2021-12-21 ASSESSMENT — ENCOUNTER SYMPTOMS
SHORTNESS OF BREATH: 0
COLOR CHANGE: 0
SORE THROAT: 0
CHEST TIGHTNESS: 0
CONSTIPATION: 0
DIARRHEA: 0
VOMITING: 0
ABDOMINAL PAIN: 0
NAUSEA: 0

## 2021-12-21 ASSESSMENT — PAIN DESCRIPTION - ONSET: ONSET: ON-GOING

## 2021-12-21 ASSESSMENT — PAIN DESCRIPTION - PROGRESSION: CLINICAL_PROGRESSION: NOT CHANGED

## 2021-12-21 ASSESSMENT — PAIN SCALES - GENERAL
PAINLEVEL_OUTOF10: 9
PAINLEVEL_OUTOF10: 8
PAINLEVEL_OUTOF10: 5

## 2021-12-21 ASSESSMENT — PAIN DESCRIPTION - DESCRIPTORS: DESCRIPTORS: ACHING;DISCOMFORT

## 2021-12-21 ASSESSMENT — PAIN DESCRIPTION - LOCATION: LOCATION: TEETH

## 2021-12-21 ASSESSMENT — PAIN DESCRIPTION - PAIN TYPE: TYPE: ACUTE PAIN

## 2021-12-21 ASSESSMENT — PAIN DESCRIPTION - FREQUENCY: FREQUENCY: INTERMITTENT

## 2021-12-21 ASSESSMENT — PAIN - FUNCTIONAL ASSESSMENT: PAIN_FUNCTIONAL_ASSESSMENT: ACTIVITIES ARE NOT PREVENTED

## 2021-12-21 ASSESSMENT — PAIN DESCRIPTION - DIRECTION: RADIATING_TOWARDS: EAR

## 2021-12-21 ASSESSMENT — PAIN DESCRIPTION - ORIENTATION: ORIENTATION: RIGHT

## 2021-12-21 NOTE — PROGRESS NOTES
Comprehensive Nutrition Assessment    Type and Reason for Visit:  Initial,RD Nutrition Re-Screen/LOS    Nutrition Recommendations/Plan: Modify diet to CHO 4, 2 g Na+, Low K+. Nutrition Assessment:  Pt currently has abnormal lab values r/t renal dysfunction. Willl modify diet appropriately. PO intake is currently good. Pt awaiting SNF placement. Malnutrition Assessment:  Malnutrition Status:  No malnutrition    Context:  Acute Illness     Findings of the 6 clinical characteristics of malnutrition:  Energy Intake:  No significant decrease in energy intake  Weight Loss:  No significant weight loss     Body Fat Loss:  No significant body fat loss     Muscle Mass Loss:  No significant muscle mass loss    Fluid Accumulation:  No significant fluid accumulation     Strength:  Not Performed    Estimated Daily Nutrient Needs:  Energy (kcal):  2380 kcals/day; Weight Used for Energy Requirements:  Current     Protein (g):  102 g/PRO/day; Weight Used for Protein Requirements:  Current (1.5)        Fluid (ml/day):  1000 mL/day; Method Used for Fluid Requirements:   (1000 mL + uop)       Current Nutrition Therapies:    ADULT DIET; Easy to Chew; 3 carb choices (45 gm/meal); Low Fat/Low Chol/High Fiber/2 gm Na    Anthropometric Measures:  · Height: 5' 6\" (167.6 cm)  · Current Body Weight: 150 #    · Ideal Body Weight: 130 lbs  · BMI Categories: Normal Weight (BMI 18.5-24. 9)       Nutrition Diagnosis:   · Altered nutrition-related lab values related to renal dysfunction as evidenced by lab values    Nutrition Interventions:   Food and/or Nutrient Delivery:  Modify Current Diet  Coordination of Nutrition Care:  Continue to monitor while inpatient    Goals:  Lab values will improve       Nutrition Monitoring and Evaluation:   Food/Nutrient Intake Outcomes:  Food and Nutrient Intake  Physical Signs/Symptoms Outcomes:  Biochemical Data,Fluid Status or Edema,Hemodynamic Status,Nutrition Focused Physical Findings,Weight Electronically signed by Mee Acuna MS, RD, LD on 12/21/21 at 12:11 PM CST    Contact: [phone]

## 2021-12-21 NOTE — PROGRESS NOTES
Cervical back: Normal range of motion and neck supple. Right lower leg: No edema. Left lower leg: No edema. Skin:     General: Skin is warm and dry. Capillary Refill: Capillary refill takes less than 2 seconds. Neurological:      General: No focal deficit present. Mental Status: She is alert and oriented to person, place, and time. Psychiatric:         Mood and Affect: Mood normal.         Behavior: Behavior normal.         Thought Content: Thought content normal.         Judgment: Judgment normal.             Medications:      dextrose      dextrose 5 % and 0.45 % NaCl Stopped (12/14/21 1836)      cloNIDine  1 patch TransDERmal Weekly    insulin glargine  45 Units SubCUTAneous Nightly    nicotine  1 patch TransDERmal Daily    minoxidil  2.5 mg Oral Daily    DULoxetine  60 mg Oral Daily    vitamin D  50,000 Units Oral Q14 Days    hydrALAZINE  100 mg Oral 3 times per day    hydroCHLOROthiazide  50 mg Oral Daily    isosorbide mononitrate  120 mg Oral Daily    levothyroxine  150 mcg Oral Daily    lisinopril  20 mg Oral BID    metoprolol succinate  100 mg Oral QAM AC    NIFEdipine  90 mg Oral Daily    rOPINIRole  4 mg Oral Nightly    senna  1 tablet Oral Daily    atorvastatin  20 mg Oral Daily    [Held by provider] insulin lispro  10 Units SubCUTAneous TID WC    insulin lispro  10 Units SubCUTAneous TID AC    insulin lispro  0-6 Units SubCUTAneous TID WC    insulin lispro  0-3 Units SubCUTAneous Nightly     ondansetron, glucose, dextrose, glucagon (rDNA), dextrose, potassium chloride, magnesium sulfate, sodium phosphate IVPB **OR** sodium phosphate IVPB **OR** sodium phosphate IVPB, dextrose 5 % and 0.45 % NaCl, hydrALAZINE, labetalol, HYDROcodone-acetaminophen, albuterol  ADULT DIET; Easy to Chew; 4 carb choices (60 gm/meal); Low Sodium (2 gm);  Low Potassium (Less than 3000 mg/day)     Lab and other Data:     Recent Labs     12/19/21  0449 12/20/21  0501 12/21/21  0148 WBC 4.6* 4.6* 4.8   HGB 11.7* 11.1* 11.5*    149 164     Recent Labs     12/19/21 0449 12/20/21  0501 12/21/21  0148   * 129* 130*   K 5.0 5.1* 5.4*   CL 93* 91* 91*   CO2 25 25 24   BUN 27* 41* 48*   CREATININE 4.1* 5.2* 6.5*   GLUCOSE 386* 235* 97     Recent Labs     12/19/21 0449 12/20/21  0501 12/21/21  0148   AST 16 14 17   ALT 9 7 9   BILITOT 0.4 0.3 0.3   ALKPHOS 92 86 85     Troponin T: No results for input(s): TROPONINI in the last 72 hours. Pro-BNP: No results for input(s): BNP in the last 72 hours. INR: No results for input(s): INR in the last 72 hours. UA:No results for input(s): NITRITE, COLORU, PHUR, LABCAST, WBCUA, RBCUA, MUCUS, TRICHOMONAS, YEAST, BACTERIA, CLARITYU, SPECGRAV, LEUKOCYTESUR, UROBILINOGEN, BILIRUBINUR, BLOODU, GLUCOSEU, AMORPHOUS in the last 72 hours. Invalid input(s): Roxjonny Berman  A1C: No results for input(s): LABA1C in the last 72 hours. ABG:No results for input(s): PHART, ICJ5KUL, PO2ART, ANK8JQF, BEART, HGBAE, C2SBVLZC, CARBOXHGBART in the last 72 hours. RAD:   XR CHEST PORTABLE    Result Date: 12/14/2021  1. Moderate improvement in pulmonary vascular congestion since the previous study. 2. A small right basal pleural effusion. 3. A persistent moderate cardiomegaly. 4. Moderate diffuse osteopenia.  Signed by Dr Sully Leyva           Assessment/Plan   Hyperosmolar hyperglycemic state (HHS) [facility]) / Type 2 diabetes mellitus with chronic kidney disease on chronic dialysis, with long-term current use of insulin (HCC)              -Transitioned off of insulin drip              -Basal increased   -continue prandial insulin              -Sliding scale insulin              -Accu-Cheks              -Hypoglycemia protocol in place              -DM teaching     Active Problems:  ESRD (end stage renal disease) on dialysis [facility])              -Nephrology managing dialysis care       Hypertensive emergency              -Transitioned off Cardene drip              -Oral medications resumed              -Minoxidil added, much better control              -Every 4 vital signs       Generalized weakness              --noted   -accepted to SNF pending precert     Dental pain   -oral abx   -f/u with outpatient dentist    DVT Prophylaxis: heparin      ROMINA Kurtz - CNP, 12/21/2021 4:51 PM

## 2021-12-21 NOTE — PROGRESS NOTES
Nephrology ([address] [facility]) Progress Note    Patient:  Kimberly Hernandez  YOB: 1970  Date of Service: 12/21/2021  MRN: [id number]   Acct: [de-identified]   Primary Care Physician: Matthew Phoenix, APRN  Advance Directive: Full Code  Admit Date: 12/14/2021       Hospital Day: 7  Referring Provider: Annie Camarena MD    Patient Seen, Chart, Consults notes, Labs, Radiology studies reviewed. Chief complaint: Increasing shortness of breath/end-stage renal disease. Subjective:  Patient is a 40-year-old woman with a past medical history of hypertension, type 2 diabetes and end-stage renal disease.  She is on chronic maintenance hemodialysis at [facility] on T,T,S.  She was in her regular state of health until recently when she started having nausea recurrent vomiting and diarrhea.  Patient was unable to keep her medications down.  She presented to the emergency room and she was having a hypertensive crisis with severe hyperglycemia (>1000).   She was subsequently admitted to ICU.  Renal service was consulted to manage her ESRD.  Patient is s/p dialysis on 12/17. Hospital course remarkable for hemodialysis treatment with large ultrafiltration and has significant improvement of blood pressure control. Her blood pressure medication has been adjusted. She was now moved to regular floor. Currently seen on hemodialysis  Hemodialysis access: AV fistula  Hemodialysis: 3-1/2-hour  Ultrafiltration: 3500 cc  2K bath  Blood flow rate is 450 cc/min.     Allergies:  Penicillins, Lamisil advanced [terbinafine], Stadol [butorphanol], and Reglan [metoclopramide]    Medicines:  Current Facility-Administered Medications   Medication Dose Route Frequency Provider Last Rate Last Admin    cloNIDine (CATAPRES) 0.1 MG/24HR 1 patch  1 patch TransDERmal Weekly ROMINA Frey - CNP        insulin glargine (LANTUS) injection vial 45 Units  45 Units SubCUTAneous Nightly Charley Palm APRN - CNP   45 Units at 12/19/21 2045    nicotine (NICODERM CQ) 14 MG/24HR 1 patch  1 patch TransDERmal Daily Marychuy Regan MD   1 patch at 12/20/21 1419    minoxidil (LONITEN) tablet 2.5 mg  2.5 mg Oral Daily Willma Solid, APRN - CNP   2.5 mg at 12/20/21 0831    ondansetron (ZOFRAN) injection 4 mg  4 mg IntraVENous Q6H PRN Bonnie Avalos MD   4 mg at 12/20/21 1927    glucose (GLUTOSE) 40 % oral gel 15 g  15 g Oral PRN Barnie Lunch., MD   15 g at 12/20/21 2215    dextrose 50 % IV solution  12.5 g IntraVENous PRN Barnie Lunch., MD   12.5 g at 12/20/21 2336    glucagon (rDNA) injection 1 mg  1 mg IntraMUSCular PRN Barnie Lunch., MD        dextrose 5 % solution  100 mL/hr IntraVENous PRN Barnie Lunch., MD        potassium chloride 10 mEq/100 mL IVPB (Peripheral Line)  10 mEq IntraVENous PRN Sandy Wood  mL/hr at 12/14/21 2207 10 mEq at 12/14/21 2207    magnesium sulfate 1000 mg in dextrose 5% 100 mL IVPB  1,000 mg IntraVENous PRN Sandy Wood MD        sodium phosphate 10 mmol in dextrose 5 % 250 mL IVPB  10 mmol IntraVENous PRN Sandy Wood MD        Or    sodium phosphate 15 mmol in dextrose 5 % 250 mL IVPB  15 mmol IntraVENous PRN Sandy Wood MD   Stopped at 12/15/21 0307    Or    sodium phosphate 20 mmol in dextrose 5 % 500 mL IVPB  20 mmol IntraVENous PRN Sandy Wood MD        dextrose 5 % and 0.45 % sodium chloride infusion   IntraVENous Continuous PRN Sandy Wood MD   Stopped at 12/14/21 1836    hydrALAZINE (APRESOLINE) injection 10 mg  10 mg IntraVENous Q6H PRN Sandy Wood MD   10 mg at 12/17/21 1730    labetalol (NORMODYNE;TRANDATE) injection 20 mg  20 mg IntraVENous Q4H PRN Sandy Wood MD        DULoxetine (CYMBALTA) extended release capsule 60 mg  60 mg Oral Daily Sandy Wood MD   60 mg at 12/20/21 0831    vitamin D (ERGOCALCIFEROL) capsule 50,000 Units  50,000 Units Oral Q14 Days Sandy Wood MD   50,000 Units at 12/19/21 1321    hydrALAZINE (APRESOLINE) tablet 100 mg  100 mg Oral 3 times per day Wilmer Hung MD   100 mg at 12/20/21 1305    hydroCHLOROthiazide (HYDRODIURIL) tablet 50 mg  50 mg Oral Daily Wilmer Hung MD   50 mg at 12/20/21 0830    HYDROcodone-acetaminophen (Alise ) 5-325 MG per tablet 1 tablet  1 tablet Oral Q6H PRN Wilmer Hung MD   1 tablet at 12/20/21 1828    isosorbide mononitrate (IMDUR) extended release tablet 120 mg  120 mg Oral Daily Wilmer Hung MD   120 mg at 12/20/21 0831    levothyroxine (SYNTHROID) tablet 150 mcg  150 mcg Oral Daily Wilmer Hung MD   150 mcg at 12/21/21 0558    lisinopril (PRINIVIL;ZESTRIL) tablet 20 mg  20 mg Oral BID Wilmer Hung MD   20 mg at 12/20/21 0831    metoprolol succinate (TOPROL XL) extended release tablet 100 mg  100 mg Oral QAM AC Wilmer Hung MD   100 mg at 12/20/21 0559    NIFEdipine (PROCARDIA XL) extended release tablet 90 mg  90 mg Oral Daily Wilmer Hung MD   90 mg at 12/20/21 0831    rOPINIRole (REQUIP) tablet 4 mg  4 mg Oral Nightly Wilmer Hung MD   4 mg at 12/19/21 2045    senna (SENOKOT) tablet 8.6 mg  1 tablet Oral Daily Wilmer Hung MD   8.6 mg at 12/20/21 0831    atorvastatin (LIPITOR) tablet 20 mg  20 mg Oral Daily Wilmer Hung MD   20 mg at 12/20/21 0831    albuterol (PROVENTIL) nebulizer solution 2.5 mg  2.5 mg Nebulization Q4H PRN Wilmer Hung MD        [Held by provider] insulin lispro (HUMALOG) injection vial 10 Units  10 Units SubCUTAneous TID GIANNA Hung MD        insulin lispro (HUMALOG) injection vial 10 Units  10 Units SubCUTAneous TID [facility] Wilmer Hung MD   10 Units at 12/20/21 0836    insulin lispro (HUMALOG) injection vial 0-6 Units  0-6 Units SubCUTAneous TID GIANNA Hung MD   1 Units at 12/20/21 0836    insulin lispro (HUMALOG) injection vial 0-3 Units  0-3 Units SubCUTAneous Nightly Wilmer Hung MD   3 Units at 12/19/21 2044       Past Medical History:  Past Medical History: Diagnosis Date    Arthritis     Chronic kidney disease, stage 5, kidney failure (HCC)     Closed fracture of right shoulder girdle, with routine healing, subsequent encounter     DM (diabetes mellitus) type II controlled with renal manifestation ([facility] 06/1993    Gastroparesis     GERD (gastroesophageal reflux disease)     Hemodialysis patient ([facility]     dialysis on tues, thur, and sat at [facility]    Hypertension     Hypothyroid     Nasal congestion     recent    Neuropathy     Noncompliance with medications     Palliative care patient 10/08/2019    Restless leg syndrome        Past Surgical History:  Past Surgical History:   Procedure Laterality Date    BREAST SURGERY Left 2008    infected milk gland removed    BREAST SURGERY Right 5/25/2021    WEDGE EXCISION RIGHT BREAST DUCT WITH LACRIMAL DUCT PROBE, PEC BLOCK performed by Naomi Beaver MD at [address], LAPAROSCOPIC      2008    COLONOSCOPY Left 9/17/2020    Dr Severo Corrales hepatic flexure-Severe tortuosity with severe spasming, 1 yr recall    DIALYSIS FISTULA CREATION Left 1/25/2019    REVISION LEFT UPPER EXTREMITY AV ACCESS WITH LEFT BRACHIAL ARTERY TO LEFT AXILLARY VEIN WITH  INTERPOSITIONAL ARTEGRAFT   performed by Masha Luna MD at [address]  2012    [address] Right 1/25/2019    INSERTION OF RIGHT INTERNAL JUGULAR HEMODIALYSIS CATHETER performed by Masha Luna MD at [address] [facility]  08/17/2018    1.  Moderately increased stainable iron identified by special stain in Kupffer cells and occasional hepatocytes. Negative for evidence of significant portal or lobular inflammation.  Negative for evidence of significant fibrosis    [place] COLONOSCOPY W/BIOPSY SINGLE/MULTIPLE N/A 10/20/2017    Dr Mary Goodwin prep-Tubular AP (-) dysplasia x 2--3 yr recall    [place] EGD TRANSORAL BIOPSY SINGLE/MULTIPLE N/A 12/27/2016    Dr Terrell Newsome EGD TRANSORAL BIOPSY SINGLE/MULTIPLE N/A 10/20/2017    Dr Miriam To (-)    TUBAL LIGATION          VASCULAR SURGERY Left 2016    fistula by Dr Lakeshia Quiros at [address]  10/02/2017    [facility]. Left upper fistulograms/venograms, balloon angioplasty cephalic vein arch 26M21 conquest.    VASCULAR SURGERY  2018    FISTULOGRAM    VASCULAR SURGERY  10/29/2018    [facility]. Left upper fistulograms/venograms    VASCULAR SURGERY  2018    [facility]. Arch aortogram,left upper arteriograms.  VASCULAR SURGERY  2019    [facility]. Removal of tunneled dialyis catheter right internal jugular vein.  VASCULAR SURGERY  2019    [facility]. Left upper extremity fistulogram including venography to the superior vena cava. Balloon angioplasty left subclavian vein with 10mm x 40mm conquest balloon. Balloon angioplasty mid/proximal upper arm cephalic vein with 87VR x 40mm conquest balloon. Venograms after balloon angioplasty. Stent left mid/proximal upper arm cephalic vein stenosis fluency 10mm x 60mm self-expanding covered stent. Balloon     VASCULAR SURGERY  2019    cont angioplasty stent with 10mm x 40mm conquest balloon. Completion venograms left upper extremity.        Family History  Family History   Problem Relation Age of Onset    Colon Cancer Mother          at 63    Colon Polyps Mother     Lung Cancer Father          at 79    Colon Polyps Father     Stomach Cancer Sister     Breast Cancer Sister [id number]    Depression Daughter 25        committed suicide    Liver Cancer Neg Hx     Liver Disease Neg Hx     Esophageal Cancer Neg Hx     Rectal Cancer Neg Hx        Social History  Social History     Socioeconomic History    Marital status: Single     Spouse name: Not on file    Number of children: 2    Years of education: Not on file    Highest education level: Not on file   Occupational History    Not on file   Tobacco Use    Smoking status: Current Every Day Smoker     Packs/day: 0.50     Years: 33.00     Pack years: 16.50     Types: Cigarettes    Smokeless tobacco: Never Used    Tobacco comment: NOW at 1/4 PD, started at age 24 and has averaged 2 PPD   Vaping Use    Vaping Use: Never used   Substance and Sexual Activity    Alcohol use: No    Drug use: Not Currently     Types: Marijuana Chay Figueroa)    Sexual activity: Not Currently     Partners: Male   Other Topics Concern    Not on file   Social History Narrative    Not on file     Social Determinants of Health     Financial Resource Strain:     Difficulty of Paying Living Expenses: Not on file   Food Insecurity:     Worried About Running Out of Food in the Last Year: Not on file    Kendy of Food in the Last Year: Not on file   Transportation Needs:     Lack of Transportation (Medical): Not on file    Lack of Transportation (Non-Medical):  Not on file   Physical Activity:     Days of Exercise per Week: Not on file    Minutes of Exercise per Session: Not on file   Stress:     Feeling of Stress : Not on file   Social Connections:     Frequency of Communication with Friends and Family: Not on file    Frequency of Social Gatherings with Friends and Family: Not on file    Attends Taoist Services: Not on file    Active Member of [address] [place] [address] or Organizations: Not on file    Attends Club or Organization Meetings: Not on file    Marital Status: Not on file   Intimate Partner Violence:     Fear of Current or Ex-Partner: Not on file    Emotionally Abused: Not on file    Physically Abused: Not on file    Sexually Abused: Not on file   Housing Stability:     Unable to Pay for Housing in the Last Year: Not on file    Number of [place] in the Last Year: Not on file    Unstable Housing in the Last Year: Not on file         Review of Systems:  History obtained from chart review and the patient  General ROS: No fever or chills  Respiratory ROS: No cough, shortness of breath, wheezing  Cardiovascular ROS: no chest pain or dyspnea on exertion  Gastrointestinal ROS: No abdominal pain or melena  Genito-Urinary ROS: No dysuria or hematuria  Musculoskeletal ROS: No joint pain or swelling         Objective:  Blood pressure (!) 141/66, pulse 73, temperature 97.9 °F (36.6 °C), temperature source Temporal, resp. rate 16, height 5' 6\" (1.676 m), weight 150 lb (68 kg), SpO2 99 %. Intake/Output Summary (Last 24 hours) at 12/21/2021 1153  Last data filed at 12/20/2021 1837  Gross per 24 hour   Intake 480 ml   Output    Net 480 ml     General: alert and oriented x3   HEENT: Normocephalic atraumatic head  Neck: Supple with no JVD or carotid bruits. Chest:  clear to auscultation bilaterally without respiratory distress  CVS: regular rate and rhythm  Abdominal: soft, nontender, normal bowel sounds  Extremities: no cyanosis or edema  Skin: warm and dry without rash    Labs:  BMP:   Recent Labs     12/19/21 0449 12/20/21  0501 12/21/21  0148   * 129* 130*   K 5.0 5.1* 5.4*   CL 93* 91* 91*   CO2 25 25 24   BUN 27* 41* 48*   CREATININE 4.1* 5.2* 6.5*   CALCIUM 8.5* 8.6 8.7     CBC:   Recent Labs     12/19/21 0449 12/20/21  0501 12/21/21  0148   WBC 4.6* 4.6* 4.8   HGB 11.7* 11.1* 11.5*   HCT 38.5 36.3* 35.6*   MCV 93.9 93.6 90.1    149 164     LIVER PROFILE:   Recent Labs     12/19/21 0449 12/20/21  0501 12/21/21  0148   AST 16 14 17   ALT 9 7 9   BILITOT 0.4 0.3 0.3   ALKPHOS 92 86 85     PT/INR: No results for input(s): PROTIME, INR in the last 72 hours. APTT: No results for input(s): APTT in the last 72 hours. BNP:  No results for input(s): BNP in the last 72 hours. Ionized Calcium:No results for input(s): IONCA in the last 72 hours. Magnesium:  Recent Labs     12/19/21 0449 12/20/21  0501 12/21/21  0148   MG 2.3 2.5 2.6     Phosphorus:  No results for input(s): PHOS in the last 72 hours. HgbA1C:   No results for input(s): LABA1C in the last 72 hours.   Hepatic:   Recent Labs     12/19/21 0449 12/20/21  0501 12/21/21  0148   ALKPHOS 92 86 85   ALT 9 7 9   AST 16 14 17   PROT 6.1* 6.0* 5.8*   BILITOT 0.4 0.3 0.3   LABALBU 3.7 3.5 3.4*     Lactic Acid:   No results for input(s): LACTA in the last 72 hours. Troponin: No results for input(s): CKTOTAL, CKMB, TROPONINT in the last 72 hours. ABGs: No results for input(s): PH, PCO2, PO2, HCO3, O2SAT in the last 72 hours. CRP:  No results for input(s): CRP in the last 72 hours. Sed Rate:  No results for input(s): SEDRATE in the last 72 hours. Cultures:   No results for input(s): CULTURE in the last 72 hours. Radiology reports as per the Radiologist  Radiology: XR CHEST PORTABLE    Result Date: 12/14/2021  Examination. XR CHEST PORTABLE 12/14/2021 7:59 AM History: Fever. A frontal portable upright view of the chest is obtained and compared with the previous study dated 11/18/2021. There is moderate improvement in pulmonary vascular congestion. There is a small right basal pleural effusion. There is no pneumothorax. There is persistent moderate cardiomegaly. There is a left subclavian vascular stent in place similar to the previous study. There is moderate diffuse osteopenia. No acute bony abnormality. 1. Moderate improvement in pulmonary vascular congestion since the previous study. 2. A small right basal pleural effusion. 3. A persistent moderate cardiomegaly. 4. Moderate diffuse osteopenia. Signed by Dr Ta Blood     1. End stage renal disease currently seen on hemodialysis. 2.  Type II diabetic nephropathy. 3.  Poorly controlled type 2 diabetes. 4.  Severe systolic and diastolic hypertension/improved. 5.  Anemia of chronic kidney disease. 6.  Poor compliance. 7.  Active tobacco use. 8.  Metabolic acidemia and hyperkalemia improved    Plan:  1. Tolerating dialysis very well. 2.  Possible discharge to nursing home today. .  3.  Counseling about compliance issues

## 2021-12-21 NOTE — CARE COORDINATION
CM outreached to Chuck worley at [facility]. Facility has not heard back on precert but will check and call back.   Electronically signed by Ace Julian RN on 12/21/2021 at 4:18 PM

## 2021-12-21 NOTE — PROGRESS NOTES
Pharmacy Renal Adjustment    Bj Lind is a 45 y.o. female. Pharmacy has renally adjusted medications per protocol. Recent Labs     12/20/21  0501 12/21/21  0148   BUN 41* 48*       Recent Labs     12/20/21  0501 12/21/21  0148   CREATININE 5.2* 6.5*       Estimated Creatinine Clearance: 9 mL/min (A) (based on SCr of 6.5 mg/dL (H)). Height:   Ht Readings from Last 1 Encounters:   12/14/21 5' 6\" (1.676 m)     Weight:  Wt Readings from Last 1 Encounters:   12/14/21 150 lb (68 kg)       Hemodialysis patient    Plan: Adjust the following medications based on renal function:           Ceftin to 250mg po daily (give after HD on those days) X 7 days.     Electronically signed by Glenys Shields [facility] on 12/21/2021 at 5:32 PM

## 2021-12-21 NOTE — PLAN OF CARE
Nutrition Problem #1: Altered nutrition-related lab values  Intervention: Food and/or Nutrient Delivery: Modify Current Diet  Nutritional Goals: Lab values will improve

## 2021-12-21 NOTE — PROGRESS NOTES
Physical Therapy  Name: Nora Vaughn  MRN:  [id number]  Date of service:  12/21/2021 12/21/21 160   Subjective   Subjective Attempt: Pt adamently declines mobility at this time, states that she is too worn out following dialysis this a.m.          Electronically signed by Kirill Castellano PTA on 12/21/2021 at 4:10 PM

## 2021-12-22 VITALS
WEIGHT: 150 LBS | BODY MASS INDEX: 24.11 KG/M2 | SYSTOLIC BLOOD PRESSURE: 153 MMHG | TEMPERATURE: 96.9 F | HEIGHT: 66 IN | RESPIRATION RATE: 16 BRPM | HEART RATE: 79 BPM | DIASTOLIC BLOOD PRESSURE: 65 MMHG | OXYGEN SATURATION: 99 %

## 2021-12-22 LAB
ALBUMIN SERPL-MCNC: 3.5 G/DL (ref 3.5–5.2)
ALP BLD-CCNC: 88 U/L (ref 35–104)
ALT SERPL-CCNC: 11 U/L (ref 5–33)
ANION GAP SERPL CALCULATED.3IONS-SCNC: 12 MMOL/L (ref 7–19)
AST SERPL-CCNC: 22 U/L (ref 5–32)
BASOPHILS ABSOLUTE: 0.1 K/UL (ref 0–0.2)
BASOPHILS RELATIVE PERCENT: 1.7 % (ref 0–1)
BILIRUB SERPL-MCNC: 0.4 MG/DL (ref 0.2–1.2)
BUN BLDV-MCNC: 26 MG/DL (ref 6–20)
CALCIUM SERPL-MCNC: 8.7 MG/DL (ref 8.6–10)
CHLORIDE BLD-SCNC: 93 MMOL/L (ref 98–111)
CO2: 27 MMOL/L (ref 22–29)
CREAT SERPL-MCNC: 4.6 MG/DL (ref 0.5–0.9)
EOSINOPHILS ABSOLUTE: 0.2 K/UL (ref 0–0.6)
EOSINOPHILS RELATIVE PERCENT: 5 % (ref 0–5)
GFR AFRICAN AMERICAN: 12
GFR NON-AFRICAN AMERICAN: 10
GLUCOSE BLD-MCNC: 213 MG/DL (ref 70–99)
GLUCOSE BLD-MCNC: 300 MG/DL (ref 74–109)
GLUCOSE BLD-MCNC: 340 MG/DL (ref 70–99)
GLUCOSE BLD-MCNC: 394 MG/DL (ref 70–99)
GLUCOSE BLD-MCNC: 44 MG/DL (ref 70–99)
GLUCOSE BLD-MCNC: 45 MG/DL (ref 70–99)
GLUCOSE BLD-MCNC: 62 MG/DL (ref 70–99)
GLUCOSE BLD-MCNC: 77 MG/DL (ref 70–99)
GLUCOSE BLD-MCNC: 80 MG/DL (ref 70–99)
HCT VFR BLD CALC: 35.7 % (ref 37–47)
HEMOGLOBIN: 11 G/DL (ref 12–16)
IMMATURE GRANULOCYTES #: 0 K/UL
LYMPHOCYTES ABSOLUTE: 1.2 K/UL (ref 1.1–4.5)
LYMPHOCYTES RELATIVE PERCENT: 28.1 % (ref 20–40)
MAGNESIUM: 2.4 MG/DL (ref 1.6–2.6)
MCH RBC QN AUTO: 28.6 PG (ref 27–31)
MCHC RBC AUTO-ENTMCNC: 30.8 G/DL (ref 33–37)
MCV RBC AUTO: 92.7 FL (ref 81–99)
MONOCYTES ABSOLUTE: 0.5 K/UL (ref 0–0.9)
MONOCYTES RELATIVE PERCENT: 12.1 % (ref 0–10)
NEUTROPHILS ABSOLUTE: 2.3 K/UL (ref 1.5–7.5)
NEUTROPHILS RELATIVE PERCENT: 53.1 % (ref 50–65)
PDW BLD-RTO: 15 % (ref 11.5–14.5)
PERFORMED ON: ABNORMAL
PERFORMED ON: NORMAL
PERFORMED ON: NORMAL
PLATELET # BLD: 138 K/UL (ref 130–400)
PMV BLD AUTO: 9.5 FL (ref 9.4–12.3)
POTASSIUM SERPL-SCNC: 5 MMOL/L (ref 3.5–5)
RBC # BLD: 3.85 M/UL (ref 4.2–5.4)
SARS-COV-2, NAAT: NOT DETECTED
SODIUM BLD-SCNC: 132 MMOL/L (ref 136–145)
TOTAL PROTEIN: 6 G/DL (ref 6.6–8.7)
WBC # BLD: 4.2 K/UL (ref 4.8–10.8)

## 2021-12-22 PROCEDURE — 83735 ASSAY OF MAGNESIUM: CPT

## 2021-12-22 PROCEDURE — 6370000000 HC RX 637 (ALT 250 FOR IP): Performed by: NURSE PRACTITIONER

## 2021-12-22 PROCEDURE — 97110 THERAPEUTIC EXERCISES: CPT

## 2021-12-22 PROCEDURE — 36415 COLL VENOUS BLD VENIPUNCTURE: CPT

## 2021-12-22 PROCEDURE — 6370000000 HC RX 637 (ALT 250 FOR IP): Performed by: EMERGENCY MEDICINE

## 2021-12-22 PROCEDURE — 6360000002 HC RX W HCPCS: Performed by: INTERNAL MEDICINE

## 2021-12-22 PROCEDURE — 85025 COMPLETE CBC W/AUTO DIFF WBC: CPT

## 2021-12-22 PROCEDURE — 87635 SARS-COV-2 COVID-19 AMP PRB: CPT

## 2021-12-22 PROCEDURE — 6370000000 HC RX 637 (ALT 250 FOR IP): Performed by: HOSPITALIST

## 2021-12-22 PROCEDURE — 97530 THERAPEUTIC ACTIVITIES: CPT

## 2021-12-22 PROCEDURE — 6370000000 HC RX 637 (ALT 250 FOR IP): Performed by: INTERNAL MEDICINE

## 2021-12-22 PROCEDURE — 97116 GAIT TRAINING THERAPY: CPT

## 2021-12-22 PROCEDURE — 82947 ASSAY GLUCOSE BLOOD QUANT: CPT

## 2021-12-22 PROCEDURE — 80053 COMPREHEN METABOLIC PANEL: CPT

## 2021-12-22 RX ORDER — INSULIN GLARGINE 100 [IU]/ML
30 INJECTION, SOLUTION SUBCUTANEOUS NIGHTLY
Status: DISCONTINUED | OUTPATIENT
Start: 2021-12-23 | End: 2021-12-22 | Stop reason: HOSPADM

## 2021-12-22 RX ORDER — CLONIDINE 0.1 MG/24H
1 PATCH, EXTENDED RELEASE TRANSDERMAL WEEKLY
Qty: 4 PATCH | Refills: 0 | Status: ON HOLD | OUTPATIENT
Start: 2021-12-28 | End: 2022-05-25 | Stop reason: HOSPADM

## 2021-12-22 RX ORDER — NICOTINE 21 MG/24HR
1 PATCH, TRANSDERMAL 24 HOURS TRANSDERMAL DAILY
Qty: 30 PATCH | Refills: 3 | Status: SHIPPED | OUTPATIENT
Start: 2021-12-23

## 2021-12-22 RX ORDER — CEFUROXIME AXETIL 250 MG/1
250 TABLET ORAL EVERY 24 HOURS
Qty: 6 TABLET | Refills: 0 | Status: SHIPPED | OUTPATIENT
Start: 2021-12-22 | End: 2021-12-28

## 2021-12-22 RX ORDER — HYDROCODONE BITARTRATE AND ACETAMINOPHEN 5; 325 MG/1; MG/1
1 TABLET ORAL EVERY 6 HOURS PRN
Qty: 10 TABLET | Refills: 0 | Status: ON HOLD | OUTPATIENT
Start: 2021-12-22 | End: 2022-01-27 | Stop reason: SDUPTHER

## 2021-12-22 RX ORDER — METRONIDAZOLE 500 MG/1
500 TABLET ORAL EVERY 8 HOURS SCHEDULED
Qty: 19 TABLET | Refills: 0 | Status: SHIPPED | OUTPATIENT
Start: 2021-12-22 | End: 2021-12-29

## 2021-12-22 RX ADMIN — Medication 15 G: at 20:42

## 2021-12-22 RX ADMIN — HYDROCHLOROTHIAZIDE 50 MG: 25 TABLET ORAL at 09:01

## 2021-12-22 RX ADMIN — DULOXETINE 60 MG: 60 CAPSULE, DELAYED RELEASE ORAL at 08:56

## 2021-12-22 RX ADMIN — LISINOPRIL 20 MG: 10 TABLET ORAL at 20:46

## 2021-12-22 RX ADMIN — ISOSORBIDE MONONITRATE 120 MG: 60 TABLET, EXTENDED RELEASE ORAL at 09:02

## 2021-12-22 RX ADMIN — NIFEDIPINE 90 MG: 30 TABLET, EXTENDED RELEASE ORAL at 09:04

## 2021-12-22 RX ADMIN — Medication 15 G: at 20:41

## 2021-12-22 RX ADMIN — INSULIN LISPRO 10 UNITS: 100 INJECTION, SOLUTION INTRAVENOUS; SUBCUTANEOUS at 11:37

## 2021-12-22 RX ADMIN — HYDRALAZINE HYDROCHLORIDE 100 MG: 50 TABLET, FILM COATED ORAL at 06:01

## 2021-12-22 RX ADMIN — INSULIN LISPRO 4 UNITS: 100 INJECTION, SOLUTION INTRAVENOUS; SUBCUTANEOUS at 08:57

## 2021-12-22 RX ADMIN — CEFUROXIME AXETIL 250 MG: 250 TABLET ORAL at 18:41

## 2021-12-22 RX ADMIN — MINOXIDIL 2.5 MG: 2.5 TABLET ORAL at 09:03

## 2021-12-22 RX ADMIN — ATORVASTATIN CALCIUM 20 MG: 40 TABLET, FILM COATED ORAL at 08:56

## 2021-12-22 RX ADMIN — HYDRALAZINE HYDROCHLORIDE 100 MG: 50 TABLET, FILM COATED ORAL at 15:51

## 2021-12-22 RX ADMIN — PROCHLORPERAZINE EDISYLATE 5 MG: 5 INJECTION INTRAMUSCULAR; INTRAVENOUS at 00:34

## 2021-12-22 RX ADMIN — METRONIDAZOLE 500 MG: 500 TABLET ORAL at 06:01

## 2021-12-22 RX ADMIN — INSULIN LISPRO 2 UNITS: 100 INJECTION, SOLUTION INTRAVENOUS; SUBCUTANEOUS at 16:48

## 2021-12-22 RX ADMIN — LISINOPRIL 20 MG: 10 TABLET ORAL at 09:03

## 2021-12-22 RX ADMIN — METOPROLOL SUCCINATE 100 MG: 50 TABLET, EXTENDED RELEASE ORAL at 06:01

## 2021-12-22 RX ADMIN — ROPINIROLE HYDROCHLORIDE 4 MG: 4 TABLET, FILM COATED ORAL at 20:46

## 2021-12-22 RX ADMIN — INSULIN LISPRO 10 UNITS: 100 INJECTION, SOLUTION INTRAVENOUS; SUBCUTANEOUS at 16:47

## 2021-12-22 RX ADMIN — LEVOTHYROXINE SODIUM 150 MCG: 75 TABLET ORAL at 06:01

## 2021-12-22 RX ADMIN — SENNOSIDES 8.6 MG: 8.6 TABLET, FILM COATED ORAL at 08:56

## 2021-12-22 RX ADMIN — INSULIN LISPRO 5 UNITS: 100 INJECTION, SOLUTION INTRAVENOUS; SUBCUTANEOUS at 11:38

## 2021-12-22 RX ADMIN — METRONIDAZOLE 500 MG: 500 TABLET ORAL at 15:51

## 2021-12-22 NOTE — PROGRESS NOTES
Occupational Therapy     12/22/21 1526   Restrictions/Precautions   Restrictions/Precautions Fall Risk;Isolation;Contact Precautions   Required Braces or Orthoses? No   Vision   Vision [facility]   Hearing   Hearing [facility]   General   Chart Reviewed Yes   Patient assessed for rehabilitation services? Yes   Response to previous treatment Patient with no complaints from previous session   Family / Caregiver Present No   Diagnosis Hyperosmolar hyperglycemic state   Subjective   Subjective Pt in bed upon arrival for therapy. Pt agreeable to participate. Pain Assessment   Patient Currently in Pain No   Orientation   Overall Orientation Status WFL   ADL   Feeding Independent;Setup   Grooming Setup; Independent   UE Bathing Supervision   LE Bathing Minimal assistance   UE Dressing Supervision   LE Dressing Minimal assistance   Toileting Contact guard assistance;Minimal assistance   Additional Comments Currently fluctuations (on top of balance issues), would suggest baseline supervision to monitor for med changes. Balance   Sitting Balance Supervision   Standing Balance Contact guard assistance   Functional Mobility   Functional - Mobility Device 4-Wheeled Walker   Activity Other   Assist Level Contact guard assistance   Functional Mobility Comments in her room door<>window 4x   Bed mobility   Supine to Sit Supervision   Sit to Supine Supervision   Scooting Supervision   Transfers   Sit to stand Contact guard assistance   Stand to sit Contact guard assistance   Transfer Comments with 4WW   Type of ROM/Therapeutic Exercise   Type of ROM/Therapeutic Exercise AROM   Comment Pt instructed on AROM exercises to improve endurance and overall strength while sitting EOB with rest breaks as needed, in various planes. Assessment   Performance deficits / Impairments Decreased functional mobility ; Decreased ADL status; Decreased strength;Decreased safe awareness;Decreased endurance;Decreased balance;Decreased high-level IADLs   Assessment

## 2021-12-22 NOTE — DISCHARGE SUMMARY
Carlyn Brewster  :  1970  MRN:  [id number]    Admit date:  2021  Discharge date:  21    Discharging Physician:  Dr. Jeanine Sharma Directive: Prior    Consults: Tello Coughlin     Primary Care Physician:  Beronica     Discharge Diagnoses:  Principal Problem:    Hyperosmolar hyperglycemic state (HHS) ([facility] [address]  Active Problems:    Type 2 diabetes mellitus with chronic kidney disease on chronic dialysis, with long-term current use of insulin (HCC)    ESRD (end stage renal disease) on dialysis [facility])    Hypertensive emergency    Generalized weakness  Resolved Problems:    * No resolved hospital problems. *      Portions of this note have been copied forward, however, changed to reflect the most current clinical status of this patient. Hospital Course:     Patient is a 58-year-old female with past medical history ESRD on dialysis, hypertension, diabetes, gastroparesis, GERD, and restless leg syndrome who presented to [address] [place] [address] ED with complaints of nausea, vomiting, and diarrhea x3 days.  Ms. Beatrice Morris reported nonbloody vomiting and diarrhea, fatigue, and weakness.  She reported she had not been able to keep her medications down.  Work-up in the ED revealed elevated glucose and hypertensive urgency.  Patient was admitted to the ICU for Cardene and insulin drips.  Patient underwent IV fluid resuscitation.  Medications were transitioned to oral and subcutaneous insulin and she was transferred out of the ICU on 12/15/2021.  Oral medications were resumed and clonidine patch was resumed as well.  Patient continued to have blood pressures with systolics over 624.  Minoxidil initiated and indicated excellent control of hypertension, lowered catapres patch dose.  Glucose being controlled with subcu insulin at this time, basal insulin increased. Patient noted to have elevated glucose day of discharge related to refusal of Lanuts.   Swelling around broken tooth noted, added oral antibiotics, patient will need outpatient f/u with dentistry. Patient is now medically stable at accepted SNF. Significant Diagnostic Studies:   XR CHEST PORTABLE    Result Date: 12/14/2021  Examination. XR CHEST PORTABLE 12/14/2021 7:59 AM History: Fever. A frontal portable upright view of the chest is obtained and compared with the previous study dated 11/18/2021. There is moderate improvement in pulmonary vascular congestion. There is a small right basal pleural effusion. There is no pneumothorax. There is persistent moderate cardiomegaly. There is a left subclavian vascular stent in place similar to the previous study. There is moderate diffuse osteopenia. No acute bony abnormality. 1. Moderate improvement in pulmonary vascular congestion since the previous study. 2. A small right basal pleural effusion. 3. A persistent moderate cardiomegaly. 4. Moderate diffuse osteopenia. Signed by Dr Pereira Devoid:   CBC:   Recent Labs     12/21/21  0148 12/22/21  0356   WBC 4.8 4.2*   HGB 11.5* 11.0*    138     BMP:    Recent Labs     12/21/21 0148 12/22/21  0356   * 132*   K 5.4* 5.0   CL 91* 93*   CO2 24 27   BUN 48* 26*   CREATININE 6.5* 4.6*   GLUCOSE 97 300*     INR: No results for input(s): INR in the last 72 hours. Physical Exam:  Vital Signs: BP (!) 153/65   Pulse 79   Temp 96.9 °F (36.1 °C) (Temporal)   Resp 16   Ht 5' 6\" (1.676 m)   Wt 150 lb (68 kg)   SpO2 99%   BMI 24.21 kg/m²   Physical Exam  Vitals and nursing note reviewed. Constitutional:       Appearance: She is ill-appearing (Chronically). Comments: Appears much older than stated age   HENT:      Head: Normocephalic. Mouth/Throat:      Mouth: Mucous membranes are moist.        Comments: Multiple missing and broken teeth, redness and swelling noted around tooth  Eyes:      Conjunctiva/sclera: Conjunctivae normal.      Pupils: Pupils are equal, round, and reactive to light.    Cardiovascular: Rate and Rhythm: Normal rate and regular rhythm. Pulses: Normal pulses. Heart sounds: Normal heart sounds. Pulmonary:      Effort: Pulmonary effort is normal.      Breath sounds: Normal breath sounds. Abdominal:      General: Abdomen is flat. Bowel sounds are normal. There is no distension. Palpations: Abdomen is soft. Tenderness: There is no abdominal tenderness. There is no guarding. Musculoskeletal:         General: Normal range of motion. Cervical back: Normal range of motion and neck supple. Right lower leg: No edema. Left lower leg: No edema. Skin:     General: Skin is warm and dry. Capillary Refill: Capillary refill takes less than 2 seconds. Neurological:      General: No focal deficit present. Mental Status: She is alert and oriented to person, place, and time. Psychiatric:         Mood and Affect: Mood normal.         Behavior: Behavior normal.         Thought Content:  Thought content normal.         Judgment: Judgment normal.         Discharge Medications:         Medication List      START taking these medications    cefUROXime 250 MG tablet  Commonly known as: CEFTIN  Take 1 tablet by mouth every 24 hours for 6 doses     cloNIDine 0.1 MG/24HR Ptwk  Commonly known as: CATAPRES  Place 1 patch onto the skin once a week  Start taking on: December 28, 2021  Replaces: cloNIDine 0.3 MG/24HR Ptwk     metroNIDAZOLE 500 MG tablet  Commonly known as: FLAGYL  Take 1 tablet by mouth every 8 hours for 19 doses     minoxidil 2.5 MG tablet  Commonly known as: LONITEN  Take 1 tablet by mouth daily     nicotine 14 MG/24HR  Commonly known as: NICODERM CQ  Place 1 patch onto the skin daily  Start taking on: December 23, 2021        CONTINUE taking these medications    albuterol sulfate  (90 Base) MCG/ACT inhaler     Basaglar KwikPen 100 UNIT/ML injection pen  Generic drug: insulin glargine     DULoxetine 60 MG extended release capsule  Commonly known as: CYMBALTA     hydrALAZINE 100 MG tablet  Commonly known as: APRESOLINE  Take 1 tablet by mouth every 8 hours     hydroCHLOROthiazide 25 MG tablet  Commonly known as: HYDRODIURIL  Take 2 tablets by mouth daily     isosorbide mononitrate 120 MG extended release tablet  Commonly known as: IMDUR  Take 1 tablet by mouth daily     levothyroxine 150 MCG tablet  Commonly known as: SYNTHROID     lisinopril 20 MG tablet  Commonly known as: PRINIVIL;ZESTRIL  Take 1 tablet by mouth 2 times daily     metoprolol succinate 100 MG extended release tablet  Commonly known as: TOPROL XL  Take 1 tablet by mouth every morning (before breakfast)     NIFEdipine 30 MG extended release tablet  Commonly known as: PROCARDIA XL  Take 3 tablets by mouth daily     NovoLOG FlexPen 100 UNIT/ML injection pen  Generic drug: insulin aspart  Inject 10 Units into the skin 3 times daily Indications: Diabetes     rOPINIRole 2 MG tablet  Commonly known as: REQUIP     Senna-Lax 8.6 MG tablet  Generic drug: senna     sevelamer 800 MG tablet  Commonly known as: RENVELA     simvastatin 40 MG tablet  Commonly known as: ZOCOR     Vitamin D2 10 MCG (400 UNIT) Tabs        STOP taking these medications    cloNIDine 0.3 MG/24HR Ptwk  Commonly known as: CATAPRES  Replaced by: cloNIDine 0.1 MG/24HR Ptwk     nystatin 393273 UNIT/GM cream  Commonly known as: MYCOSTATIN     nystatin 172463 UNIT/ML suspension  Commonly known as: MYCOSTATIN        ASK your doctor about these medications    HYDROcodone-acetaminophen 5-325 MG per tablet  Commonly known as: Norco  Take 1 tablet by mouth every 6 hours as needed for Pain.            Where to Get Your Medications      These medications were sent to [place] [address] #[id number] [facility] [place] [address] [phone] [address] [address] [address]    Phone: [phone]   · cefUROXime 250 MG tablet  · cloNIDine 0.1 MG/24HR Ptwk  · metroNIDAZOLE 500 MG tablet  · minoxidil 2.5 MG tablet  · nicotine 14 MG/24HR         Discharge Instructions: Follow up with ROMINA Thapa in 3-5 days. Take medications as directed. Resume activity as tolerated. Diet: No diet orders on file     Disposition: Patient is Stableand will be discharged to [address] [place] [address]. Time spent on discharge 37 minutes spent in assessing patient, reviewing medications, discussion with nursing, confirming safe discharge plan and preparation of discharge summary.     Signed:  ROMINA Martínez - CNP, 12/23/2021 4:51 PM

## 2021-12-22 NOTE — CARE COORDINATION
I called [employer] and transferred to [facility] for authorization for skilled nursing facility. I spoke with Magda. She states this case is being reviewed by Timothy Bonner CM at this time.     [address] [place] [address]  #[phone]    Electronically signed by Evans Isbell, RN, BSN on 12/22/2021 at 1:51 PM

## 2021-12-22 NOTE — PROGRESS NOTES
Physical Therapy  Name: Meenakshi Moss  MRN:  [id number]  Date of service:  12/22/2021 12/22/21 1440   Restrictions/Precautions   Restrictions/Precautions Fall Risk;Isolation;Contact Precautions   Required Braces or Orthoses? No   Subjective   Subjective Patient agrees to therapy    Pain Screening   Patient Currently in Pain No   Bed Mobility   Scooting Stand by assistance   Transfers   Sit to Stand Contact guard assistance   Stand to sit Contact guard assistance   Ambulation   Ambulation? Yes   Ambulation 1   Surface level tile   Device Rollator   Assistance Contact guard assistance;Minimal assistance   Quality of Gait GUARDED, SLIGHTLY UNSTEADY   Gait Deviations Slow Soraya;Decreased step length   Distance 50   Comments Around room   Exercises   Hip Flexion x 10 in standing    Short term goals   Time Frame for Short term goals 14 DAYS   Short term goal 1 BED MOB INDEPENDENT   Short term goal 2 TRANSFERS INDEPENDENT   Short term goal 3 AMB 48' RW SUPERVISION   Conditions Requiring Skilled Therapeutic Intervention   Body structures, Functions, Activity limitations Decreased functional mobility ; Decreased ADL status; Decreased strength;Decreased endurance;Decreased balance   Assessment Patient able to walk aorund room multiple times and perform standing marching exercises having no LOB. Educated and cues given for rollator safety. Patient left sitting EOB with nursing aid getting BP checked.     Activity Tolerance   Activity Tolerance Patient Tolerated treatment well   Safety Devices   Type of devices Call light within reach;Gait belt;Left in bed         Electronically signed by Jake Palafox PTA on 12/22/2021 at 2:53 PM

## 2021-12-22 NOTE — DISCHARGE INSTR - DIET
Good nutrition is important when healing from an illness, injury, or surgery. Follow any nutrition recommendations given to you during your hospital stay. If you were given an oral nutrition supplement while in the hospital, continue to take this supplement at home. You can take it with meals, in-between meals, and/or before bedtime. These supplements can be purchased at most local grocery stores, pharmacies, and chain [employer]-stores. If you have any questions about your diet or nutrition, call the hospital and ask for the dietitian. Diet: ADULT DIET; Easy to Chew; 4 carb choices (60 gm/meal); Low Sodium (2 gm);  Low Potassium (Less than 3000 mg/day)

## 2021-12-23 NOTE — PROGRESS NOTES
Patient's blood sugar dropped. Hypoglycemia protocol initiated and Dr. Garrett Jara notified. Patient's blood sugar recovered to 80. Patient aware. [address] called and updated. Patient discharged per orders.

## 2021-12-29 ENCOUNTER — HOSPITAL ENCOUNTER (EMERGENCY)
Age: 51
Discharge: HOME | End: 2021-12-29
Attending: EMERGENCY MEDICINE
Payer: MEDICARE

## 2021-12-29 VITALS
TEMPERATURE: 98.2 F | DIASTOLIC BLOOD PRESSURE: 71 MMHG | OXYGEN SATURATION: 99 % | RESPIRATION RATE: 18 BRPM | SYSTOLIC BLOOD PRESSURE: 128 MMHG | HEART RATE: 73 BPM

## 2021-12-29 DIAGNOSIS — E11.65 HYPERGLYCEMIA DUE TO DIABETES MELLITUS (HCC): Primary | ICD-10-CM

## 2021-12-29 LAB
ALBUMIN SERPL-MCNC: 4.1 G/DL (ref 3.5–5.2)
ALP BLD-CCNC: 122 U/L (ref 35–104)
ALT SERPL-CCNC: 17 U/L (ref 5–33)
ANION GAP SERPL CALCULATED.3IONS-SCNC: 16 MMOL/L (ref 7–19)
AST SERPL-CCNC: 24 U/L (ref 5–32)
BASOPHILS ABSOLUTE: 0.1 K/UL (ref 0–0.2)
BASOPHILS RELATIVE PERCENT: 1.9 % (ref 0–1)
BILIRUB SERPL-MCNC: 0.4 MG/DL (ref 0.2–1.2)
BUN BLDV-MCNC: 24 MG/DL (ref 6–20)
CALCIUM SERPL-MCNC: 8.6 MG/DL (ref 8.6–10)
CHLORIDE BLD-SCNC: 82 MMOL/L (ref 98–111)
CHP ED QC CHECK: YES
CO2: 23 MMOL/L (ref 22–29)
CREAT SERPL-MCNC: 3.2 MG/DL (ref 0.5–0.9)
EOSINOPHILS ABSOLUTE: 0.2 K/UL (ref 0–0.6)
EOSINOPHILS RELATIVE PERCENT: 3.1 % (ref 0–5)
GFR AFRICAN AMERICAN: 18
GFR NON-AFRICAN AMERICAN: 15
GLUCOSE BLD-MCNC: 499 MG/DL
GLUCOSE BLD-MCNC: 499 MG/DL (ref 70–99)
GLUCOSE BLD-MCNC: 739 MG/DL (ref 74–109)
HCT VFR BLD CALC: 34.1 % (ref 37–47)
HEMOGLOBIN: 10.7 G/DL (ref 12–16)
IMMATURE GRANULOCYTES #: 0 K/UL
LYMPHOCYTES ABSOLUTE: 1 K/UL (ref 1.1–4.5)
LYMPHOCYTES RELATIVE PERCENT: 19.9 % (ref 20–40)
MCH RBC QN AUTO: 28.6 PG (ref 27–31)
MCHC RBC AUTO-ENTMCNC: 31.4 G/DL (ref 33–37)
MCV RBC AUTO: 91.2 FL (ref 81–99)
MONOCYTES ABSOLUTE: 0.5 K/UL (ref 0–0.9)
MONOCYTES RELATIVE PERCENT: 9.8 % (ref 0–10)
NEUTROPHILS ABSOLUTE: 3.1 K/UL (ref 1.5–7.5)
NEUTROPHILS RELATIVE PERCENT: 65.1 % (ref 50–65)
PDW BLD-RTO: 14.6 % (ref 11.5–14.5)
PERFORMED ON: ABNORMAL
PLATELET # BLD: 105 K/UL (ref 130–400)
PMV BLD AUTO: 9.4 FL (ref 9.4–12.3)
POTASSIUM SERPL-SCNC: 3.3 MMOL/L (ref 3.5–5)
RBC # BLD: 3.74 M/UL (ref 4.2–5.4)
SODIUM BLD-SCNC: 121 MMOL/L (ref 136–145)
TOTAL PROTEIN: 7.3 G/DL (ref 6.6–8.7)
WBC # BLD: 4.8 K/UL (ref 4.8–10.8)

## 2021-12-29 PROCEDURE — 6370000000 HC RX 637 (ALT 250 FOR IP): Performed by: EMERGENCY MEDICINE

## 2021-12-29 PROCEDURE — 2580000003 HC RX 258: Performed by: EMERGENCY MEDICINE

## 2021-12-29 PROCEDURE — 82947 ASSAY GLUCOSE BLOOD QUANT: CPT

## 2021-12-29 PROCEDURE — 85025 COMPLETE CBC W/AUTO DIFF WBC: CPT

## 2021-12-29 PROCEDURE — 96361 HYDRATE IV INFUSION ADD-ON: CPT

## 2021-12-29 PROCEDURE — 99283 EMERGENCY DEPT VISIT LOW MDM: CPT

## 2021-12-29 PROCEDURE — 80053 COMPREHEN METABOLIC PANEL: CPT

## 2021-12-29 PROCEDURE — 36415 COLL VENOUS BLD VENIPUNCTURE: CPT

## 2021-12-29 PROCEDURE — 96374 THER/PROPH/DIAG INJ IV PUSH: CPT

## 2021-12-29 RX ORDER — 0.9 % SODIUM CHLORIDE 0.9 %
1000 INTRAVENOUS SOLUTION INTRAVENOUS ONCE
Status: COMPLETED | OUTPATIENT
Start: 2021-12-29 | End: 2021-12-29

## 2021-12-29 RX ADMIN — SODIUM CHLORIDE 1000 ML: 9 INJECTION, SOLUTION INTRAVENOUS at 15:56

## 2021-12-29 RX ADMIN — INSULIN HUMAN 15 UNITS: 100 INJECTION, SOLUTION PARENTERAL at 15:56

## 2021-12-29 ASSESSMENT — ENCOUNTER SYMPTOMS
SHORTNESS OF BREATH: 0
ABDOMINAL PAIN: 0
VOMITING: 0
DIARRHEA: 0
NAUSEA: 0

## 2021-12-29 ASSESSMENT — PAIN SCALES - GENERAL: PAINLEVEL_OUTOF10: 1

## 2021-12-29 NOTE — ED NOTES
Bed: 09  Expected date:   Expected time:   Means of arrival:   Comments:  Esa Welsh RN  12/29/21 7203

## 2021-12-29 NOTE — ED PROVIDER NOTES
[facility]  eMERGENCY dEPARTMENT eNCOUnter      Pt Name: Deonna Jiménez  MRN: [id number]  [place] 1970  Date of evaluation: 12/29/2021  Provider: Lesley Og MD    [address] [place] [address]       Chief Complaint   Patient presents with    Hyperglycemia     860 on blood work at [facility]    Abnormal Lab     hyponatremia         HISTORY OF PRESENT ILLNESS   (Location/Symptom, Timing/Onset,Context/Setting, Quality, Duration, Modifying Factors, Severity)  Note limiting factors. Deonna Jiménez is a 45 y.o. female who presents to the emergency department for evaluation regarding elevated serum glucose. Patient states that she underwent some blood work earlier today and her glucose was really high. States that she is not had any recent illnesses, fever, vomiting or diarrhea. She states that she recently arrived to [facility] about 1 week previously. She went there to try to gain better control of her serum glucose and to undergo some rehab for strengthening purposes. She states that yesterday evening the nursing staff neglected to give her her usual dose of insulin overnight. HPI    NursingNotes were reviewed. REVIEW OF SYSTEMS    (2-9 systems for level 4, 10 or more for level 5)     Review of Systems   Constitutional: Negative for chills and fever. Respiratory: Negative for shortness of breath. Cardiovascular: Negative for chest pain. Gastrointestinal: Negative for abdominal pain, diarrhea, nausea and vomiting. Neurological: Negative for syncope. All other systems reviewed and are negative.            PAST MEDICALHISTORY     Past Medical History:   Diagnosis Date    Arthritis     Chronic kidney disease, stage 5, kidney failure ([facility] [address]     Closed fracture of right shoulder girdle, with routine healing, subsequent encounter     DM (diabetes mellitus) type II controlled with renal manifestation ([address] 06/1993    Gastroparesis     GERD (gastroesophageal reflux disease)     Chief Hemodialysis patient ([address]     dialysis on tues, thur, and sat at [facility] Hypertension     Hypothyroid     Nasal congestion     recent    Neuropathy     Noncompliance with medications     Palliative care patient 10/08/2019    Restless leg syndrome          SURGICAL HISTORY       Past Surgical History:   Procedure Laterality Date    BREAST SURGERY Left 2008    infected milk gland removed    BREAST SURGERY Right 5/25/2021    WEDGE EXCISION RIGHT BREAST DUCT WITH LACRIMAL DUCT PROBE, PEC BLOCK performed by Markell Solomon MD at [address], [facility]      2008    COLONOSCOPY Left 9/17/2020    Dr Juan Carlos Solorzano hepatic flexure-Severe tortuosity with severe spasming, 1 yr recall    DIALYSIS FISTULA CREATION Left 1/25/2019    REVISION LEFT UPPER EXTREMITY AV ACCESS WITH LEFT BRACHIAL ARTERY TO LEFT AXILLARY VEIN WITH  INTERPOSITIONAL ARTEGRAFT   performed by Raul Major MD at [facility]  2012    [address] Right 1/25/2019    INSERTION OF RIGHT INTERNAL JUGULAR HEMODIALYSIS CATHETER performed by Raul Major MD at [facility]  08/17/2018    1.  Moderately increased stainable iron identified by special stain in Kupffer cells and occasional hepatocytes. Negative for evidence of significant portal or lobular inflammation. Negative for evidence of significant fibrosis    IA COLONOSCOPY W/BIOPSY SINGLE/MULTIPLE N/A 10/20/2017    Dr Rosibel Ingram prep-Tubular AP (-) dysplasia x 2--3 yr recall    IA EGD TRANSORAL BIOPSY SINGLE/MULTIPLE N/A 12/27/2016    Dr Eason Ranks EGD TRANSORAL BIOPSY SINGLE/MULTIPLE N/A 10/20/2017    Dr Douglas Divers (-)   [facility] VASCULAR SURGERY Left 2016    fistula by Dr Danya Acevedo at [address]  10/02/2017    SJS. Left upper fistulograms/venograms, balloon angioplasty cephalic vein arch 08A01 conquest.    VASCULAR SURGERY  08/2018    FISTULOGRAM    VASCULAR SURGERY nails times 10 with nail Nipper and dermal tool in length and thickness without incident  Discussed daily foot care hygiene including but not limited to, cleansing feet, applying moisturizer as necessary, daily skin checks, clean socks and proper shoegear. No follow-ups on file.   Â   Primary care physician:   Gerold Bloch, MD Â Â [place] Candela, DPM 10/29/2018    SJS. Left upper fistulograms/venograms    VASCULAR SURGERY  12/19/2018    SJS. Arch aortogram,left upper arteriograms.  VASCULAR SURGERY  03/06/2019    SJS. Removal of tunneled dialyis catheter right internal jugular vein.  VASCULAR SURGERY  08/28/2019    SJS. Left upper extremity fistulogram including venography to the superior vena cava. Balloon angioplasty left subclavian vein with 10mm x 40mm conquest balloon. Balloon angioplasty mid/proximal upper arm cephalic vein with 46LE x 40mm conquest balloon. Venograms after balloon angioplasty. Stent left mid/proximal upper arm cephalic vein stenosis fluency 10mm x 60mm self-expanding covered stent. Balloon     VASCULAR SURGERY  08/28/2019    cont angioplasty stent with 10mm x 40mm conquest balloon. Completion venograms left upper extremity. CURRENT MEDICATIONS     Previous Medications    ALBUTEROL SULFATE  (90 BASE) MCG/ACT INHALER    Inhale 2 puffs into the lungs every 4 hours as needed    CLONIDINE (CATAPRES) 0.1 MG/24HR PTWK    Place 1 patch onto the skin once a week    DULOXETINE (CYMBALTA) 60 MG EXTENDED RELEASE CAPSULE    Take 60 mg by mouth daily    ERGOCALCIFEROL (VITAMIN D2) 10 MCG (400 UNIT) TABS    Take 1.25 mg by mouth every 14 days    HYDRALAZINE (APRESOLINE) 100 MG TABLET    Take 1 tablet by mouth every 8 hours    HYDROCHLOROTHIAZIDE (HYDRODIURIL) 25 MG TABLET    Take 2 tablets by mouth daily    HYDROCODONE-ACETAMINOPHEN (NORCO) 5-325 MG PER TABLET    Take 1 tablet by mouth every 6 hours as needed for Pain.     INSULIN ASPART (NOVOLOG FLEXPEN) 100 UNIT/ML INJECTION PEN    Inject 10 Units into the skin 3 times daily Indications: Diabetes    INSULIN GLARGINE (BASAGLAR KWIKPEN) 100 UNIT/ML INJECTION PEN    Inject 40 Units into the skin nightly Indications: Diabetes    ISOSORBIDE MONONITRATE (IMDUR) 120 MG EXTENDED RELEASE TABLET    Take 1 tablet by mouth daily    LEVOTHYROXINE (SYNTHROID) 150 MCG TABLET    Take 150 mcg by mouth Daily    LISINOPRIL (PRINIVIL;ZESTRIL) 20 MG TABLET    Take 1 tablet by mouth 2 times daily    METOPROLOL SUCCINATE (TOPROL XL) 100 MG EXTENDED RELEASE TABLET    Take 1 tablet by mouth every morning (before breakfast)    METRONIDAZOLE (FLAGYL) 500 MG TABLET    Take 1 tablet by mouth every 8 hours for 19 doses    MINOXIDIL (LONITEN) 2.5 MG TABLET    Take 1 tablet by mouth daily    NICOTINE (NICODERM CQ) 14 MG/24HR    Place 1 patch onto the skin daily    NIFEDIPINE (PROCARDIA XL) 30 MG EXTENDED RELEASE TABLET    Take 3 tablets by mouth daily    ROPINIROLE (REQUIP) 2 MG TABLET    Take 4 mg by mouth nightly Indications: Restless Leg Syndrome     SENNA (SENNA-LAX) 8.6 MG TABLET    Take 1 tablet by mouth daily    SEVELAMER (RENVELA) 800 MG TABLET    Take 1 tablet by mouth 3 times daily (with meals)    SIMVASTATIN (ZOCOR) 40 MG TABLET    Take 40 mg by mouth nightly        ALLERGIES     Penicillins, Lamisil advanced [terbinafine], Stadol [butorphanol], and Reglan [metoclopramide]    FAMILY HISTORY       Family History   Problem Relation Age of Onset    Colon Cancer Mother          at 61    Colon Polyps Mother     Lung Cancer Father          at 66    Colon Polyps Father     Stomach Cancer Sister     Breast Cancer Sister 27    Depression Daughter 25        committed suicide    Liver Cancer Neg Hx     Liver Disease Neg Hx     Esophageal Cancer Neg Hx     Rectal Cancer Neg Hx           SOCIAL HISTORY       Social History     Socioeconomic History    Marital status: Single     Spouse name: None    Number of children: 2    Years of education: None    Highest education level: None   Occupational History    None   Tobacco Use    Smoking status: Current Every Day Smoker     Packs/day: 0.50     Years: 33.00     Pack years: 16.50     Types: Cigarettes    Smokeless tobacco: Never Used    Tobacco comment: NOW at 1/4 PD, started at age 24 and has averaged 2 PPD   Vaping Use    Vaping Use: Never used Pupils: Pupils are equal, round, and reactive to light. Neck:      Trachea: No tracheal deviation. Cardiovascular:      Rate and Rhythm: Normal rate and regular rhythm. Pulses: Normal pulses. Heart sounds: Normal heart sounds. No murmur heard. Pulmonary:      Effort: Pulmonary effort is normal. No respiratory distress. Breath sounds: Normal breath sounds. No stridor. Abdominal:      General: There is no distension. Palpations: Abdomen is soft. Tenderness: There is no abdominal tenderness. There is no guarding. Skin:     Capillary Refill: Capillary refill takes less than 2 seconds. Coloration: Skin is not pale. Findings: No rash. Neurological:      General: No focal deficit present. Mental Status: She is alert and oriented to person, place, and time. Psychiatric:         Behavior: Behavior is cooperative. DIAGNOSTIC RESULTS           LABS:  Labs Reviewed   CBC WITH AUTO DIFFERENTIAL - Abnormal; Notable for the following components:       Result Value    RBC 3.74 (*)     Hemoglobin 10.7 (*)     Hematocrit 34.1 (*)     MCHC 31.4 (*)     RDW 14.6 (*)     Platelets 703 (*)     Neutrophils % 65.1 (*)     Lymphocytes % 19.9 (*)     Basophils % 1.9 (*)     Lymphocytes Absolute 1.0 (*)     All other components within normal limits   COMPREHENSIVE METABOLIC PANEL - Abnormal; Notable for the following components:    Sodium 121 (*)     Potassium 3.3 (*)     Chloride 82 (*)     Glucose 739 (*)     BUN 24 (*)     CREATININE 3.2 (*)     GFR Non- 15 (*)     GFR African American 18 (*)     Alkaline Phosphatase 122 (*)     All other components within normal limits    Narrative:     CALL  [place]  [facility] tel. ,  Chemistry results called to and read back by [facility] RN/ER, 12/29/2021 15:14,  by [address]       All other labs were within normal range or not returned as of this dictation.     EMERGENCY DEPARTMENT COURSE and DIFFERENTIAL DIAGNOSIS/MDM: Vitals:    Vitals:    12/29/21 1401 12/29/21 1559   BP: 124/64 128/71   Pulse: 71 73   Resp: 17 18   Temp: 98.2 °F (36.8 °C)    TempSrc: Oral    SpO2: 99% 99%       MDM    Reassessment    Corrected serum sodium of 131. Repeat serum glucose 499. Patient's hyperglycemia is considerably improved after IV fluids and a dose of IV insulin. Her vital signs are otherwise stable and she does not have any overlying medical illness. Suspect that her hyperglycemia was secondary to missed insulin dosing. We will plan to let her return to the nursing home with plans to resume her regular insulin dosing regimen. Patient is agreeable with plan of care. PROCEDURES:  Unless otherwise noted below, none     Procedures    FINAL IMPRESSION      1.  Hyperglycemia due to diabetes mellitus [facility])          DISPOSITION/PLAN   DISPOSITION Decision To Discharge 12/29/2021 05:15:56 PM      PATIENT REFERRED TO:  ROMINA Harding [address] [place], [place] [phone]            DISCHARGE MEDICATIONS:  New Prescriptions    No medications on file          (Please note that portions of this note were completed with a voice recognition program.  Efforts were made to edit thedictations but occasionally words are mis-transcribed.)    Beverly Epley, MD (electronically signed)  Attending Emergency Physician         Beverly Epley, MD  12/29/21 0767

## 2022-01-01 ENCOUNTER — HOSPITAL ENCOUNTER (INPATIENT)
Age: 52
LOS: 4 days | Discharge: HOME HEALTH | DRG: 637 | End: 2022-01-05
Attending: EMERGENCY MEDICINE | Admitting: HOSPITALIST
Payer: MEDICARE

## 2022-01-01 ENCOUNTER — APPOINTMENT (OUTPATIENT)
Dept: GENERAL RADIOLOGY | Facility: HOSPITAL | Age: 52
End: 2022-01-01

## 2022-01-01 ENCOUNTER — HOSPITAL ENCOUNTER (OUTPATIENT)
Facility: HOSPITAL | Age: 52
End: 2022-05-31
Attending: INTERNAL MEDICINE | Admitting: INTERNAL MEDICINE

## 2022-01-01 ENCOUNTER — HOME HEALTH ADMISSION (OUTPATIENT)
Dept: HOME HEALTH SERVICES | Facility: HOME HEALTHCARE | Age: 52
End: 2022-01-01

## 2022-01-01 ENCOUNTER — APPOINTMENT (OUTPATIENT)
Dept: CT IMAGING | Facility: HOSPITAL | Age: 52
End: 2022-01-01

## 2022-01-01 ENCOUNTER — APPOINTMENT (OUTPATIENT)
Dept: GENERAL RADIOLOGY | Age: 52
DRG: 637 | End: 2022-01-01
Payer: MEDICARE

## 2022-01-01 ENCOUNTER — APPOINTMENT (OUTPATIENT)
Dept: CARDIOLOGY | Facility: HOSPITAL | Age: 52
End: 2022-01-01

## 2022-01-01 ENCOUNTER — TRANSCRIBE ORDERS (OUTPATIENT)
Dept: HOME HEALTH SERVICES | Facility: HOME HEALTHCARE | Age: 52
End: 2022-01-01

## 2022-01-01 ENCOUNTER — HOME CARE VISIT (OUTPATIENT)
Dept: HOME HEALTH SERVICES | Facility: CLINIC | Age: 52
End: 2022-01-01

## 2022-01-01 ENCOUNTER — HOME CARE VISIT (OUTPATIENT)
Dept: HOME HEALTH SERVICES | Facility: HOME HEALTHCARE | Age: 52
End: 2022-01-01

## 2022-01-01 ENCOUNTER — LAB REQUISITION (OUTPATIENT)
Dept: LAB | Facility: HOSPITAL | Age: 52
End: 2022-01-01

## 2022-01-01 ENCOUNTER — HOSPITAL ENCOUNTER (INPATIENT)
Facility: HOSPITAL | Age: 52
LOS: 12 days | Discharge: NURSING, REHAB OR LONG-TERM CARE | End: 2022-03-17
Attending: EMERGENCY MEDICINE | Admitting: FAMILY MEDICINE

## 2022-01-01 VITALS
RESPIRATION RATE: 16 BRPM | HEIGHT: 62 IN | WEIGHT: 127.4 LBS | BODY MASS INDEX: 23.45 KG/M2 | OXYGEN SATURATION: 100 % | HEART RATE: 74 BPM | DIASTOLIC BLOOD PRESSURE: 66 MMHG | TEMPERATURE: 98 F | SYSTOLIC BLOOD PRESSURE: 160 MMHG

## 2022-01-01 VITALS — BODY MASS INDEX: 16.17 KG/M2 | HEIGHT: 66 IN | WEIGHT: 100.6 LBS

## 2022-01-01 DIAGNOSIS — E11.00 HYPEROSMOLAR HYPERGLYCEMIC STATE (HHS) (HCC): ICD-10-CM

## 2022-01-01 DIAGNOSIS — R46.89 COGNITIVE AND BEHAVIORAL CHANGES: ICD-10-CM

## 2022-01-01 DIAGNOSIS — Z00.00 ENCOUNTER FOR GENERAL ADULT MEDICAL EXAMINATION WITHOUT ABNORMAL FINDINGS: ICD-10-CM

## 2022-01-01 DIAGNOSIS — R13.10 DYSPHAGIA, UNSPECIFIED TYPE: ICD-10-CM

## 2022-01-01 DIAGNOSIS — R41.82 ALTERED MENTAL STATUS, UNSPECIFIED ALTERED MENTAL STATUS TYPE: Primary | ICD-10-CM

## 2022-01-01 DIAGNOSIS — R26.9 GAIT ABNORMALITY: ICD-10-CM

## 2022-01-01 DIAGNOSIS — Z91.14 NONCOMPLIANCE WITH MEDICATIONS: ICD-10-CM

## 2022-01-01 DIAGNOSIS — Z47.89 ORTHOPEDIC AFTERCARE: Primary | ICD-10-CM

## 2022-01-01 DIAGNOSIS — Z78.9 DECREASED ACTIVITIES OF DAILY LIVING (ADL): ICD-10-CM

## 2022-01-01 DIAGNOSIS — Z99.2 ESRD ON HEMODIALYSIS (HCC): ICD-10-CM

## 2022-01-01 DIAGNOSIS — Z91.199 MEDICALLY NONCOMPLIANT: Chronic | ICD-10-CM

## 2022-01-01 DIAGNOSIS — R73.9 HYPERGLYCEMIA: Primary | ICD-10-CM

## 2022-01-01 DIAGNOSIS — N18.6 ESRD ON HEMODIALYSIS (HCC): ICD-10-CM

## 2022-01-01 DIAGNOSIS — R41.89 COGNITIVE AND BEHAVIORAL CHANGES: ICD-10-CM

## 2022-01-01 LAB
ALBUMIN SERPL-MCNC: 2.6 G/DL (ref 3.5–5.2)
ALBUMIN SERPL-MCNC: 2.7 G/DL (ref 3.5–5.2)
ALBUMIN SERPL-MCNC: 2.9 G/DL (ref 3.5–5.2)
ALBUMIN SERPL-MCNC: 3 G/DL (ref 3.5–5.2)
ALBUMIN SERPL-MCNC: 3 G/DL (ref 3.5–5.2)
ALBUMIN SERPL-MCNC: 3.5 G/DL (ref 3.5–5.2)
ALBUMIN SERPL-MCNC: 4.2 G/DL (ref 3.5–5.2)
ALBUMIN/GLOB SERPL: 0.7 G/DL
ALBUMIN/GLOB SERPL: 0.8 G/DL
ALBUMIN/GLOB SERPL: 1 G/DL
ALBUMIN/GLOB SERPL: 1 G/DL
ALBUMIN/GLOB SERPL: 1.1 G/DL
ALBUMIN/GLOB SERPL: 1.2 G/DL
ALP BLD-CCNC: 118 U/L (ref 35–104)
ALP SERPL-CCNC: 103 U/L (ref 39–117)
ALP SERPL-CCNC: 106 U/L (ref 39–117)
ALP SERPL-CCNC: 138 U/L (ref 39–117)
ALP SERPL-CCNC: 140 U/L (ref 39–117)
ALP SERPL-CCNC: 223 U/L (ref 39–117)
ALP SERPL-CCNC: 85 U/L (ref 39–117)
ALT SERPL W P-5'-P-CCNC: 13 U/L (ref 1–33)
ALT SERPL W P-5'-P-CCNC: 15 U/L (ref 1–33)
ALT SERPL W P-5'-P-CCNC: 202 U/L (ref 1–33)
ALT SERPL W P-5'-P-CCNC: 23 U/L (ref 1–33)
ALT SERPL W P-5'-P-CCNC: 40 U/L (ref 1–33)
ALT SERPL W P-5'-P-CCNC: 8 U/L (ref 1–33)
ALT SERPL-CCNC: 15 U/L (ref 5–33)
ANION GAP SERPL CALCULATED.3IONS-SCNC: 11 MMOL/L (ref 5–15)
ANION GAP SERPL CALCULATED.3IONS-SCNC: 11 MMOL/L (ref 5–15)
ANION GAP SERPL CALCULATED.3IONS-SCNC: 12 MMOL/L (ref 5–15)
ANION GAP SERPL CALCULATED.3IONS-SCNC: 13 MMOL/L (ref 5–15)
ANION GAP SERPL CALCULATED.3IONS-SCNC: 14 MMOL/L (ref 5–15)
ANION GAP SERPL CALCULATED.3IONS-SCNC: 14 MMOL/L (ref 5–15)
ANION GAP SERPL CALCULATED.3IONS-SCNC: 15 MMOL/L (ref 5–15)
ANION GAP SERPL CALCULATED.3IONS-SCNC: 15 MMOL/L (ref 7–19)
ANION GAP SERPL CALCULATED.3IONS-SCNC: 16 MMOL/L (ref 5–15)
ANION GAP SERPL CALCULATED.3IONS-SCNC: 19 MMOL/L (ref 5–15)
ANION GAP SERPL CALCULATED.3IONS-SCNC: 8 MMOL/L (ref 5–15)
ANISOCYTOSIS BLD QL: ABNORMAL
APTT PPP: 31.4 SECONDS (ref 24.1–35)
APTT: 29.1 SEC (ref 26–36.2)
ARTERIAL PATENCY WRIST A: ABNORMAL
ARTERIAL PATENCY WRIST A: ABNORMAL
ARTERIAL PATENCY WRIST A: POSITIVE
ARTERIAL PATENCY WRIST A: POSITIVE
AST SERPL-CCNC: 12 U/L (ref 1–32)
AST SERPL-CCNC: 17 U/L (ref 1–32)
AST SERPL-CCNC: 20 U/L (ref 5–32)
AST SERPL-CCNC: 23 U/L (ref 1–32)
AST SERPL-CCNC: 30 U/L (ref 1–32)
AST SERPL-CCNC: 38 U/L (ref 1–32)
AST SERPL-CCNC: 412 U/L (ref 1–32)
ATMOSPHERIC PRESS: 749 MMHG
ATMOSPHERIC PRESS: 751 MMHG
ATMOSPHERIC PRESS: 751 MMHG
ATMOSPHERIC PRESS: 752 MMHG
BACTERIA BLD CULT: ABNORMAL
BACTERIA BLD CULT: ABNORMAL
BACTERIA SPEC AEROBE CULT: ABNORMAL
BACTERIA SPEC AEROBE CULT: ABNORMAL
BACTERIA SPEC AEROBE CULT: NORMAL
BACTERIA SPEC RESP CULT: NORMAL
BACTERIA UR QL AUTO: ABNORMAL /HPF
BASE EXCESS ARTERIAL: -2.1 MMOL/L (ref -2–2)
BASE EXCESS BLDA CALC-SCNC: -3.5 MMOL/L (ref 0–2)
BASE EXCESS BLDA CALC-SCNC: -4.1 MMOL/L (ref 0–2)
BASE EXCESS BLDA CALC-SCNC: -7.9 MMOL/L (ref 0–2)
BASE EXCESS BLDA CALC-SCNC: 3.3 MMOL/L (ref 0–2)
BASOPHILS # BLD AUTO: 0.05 10*3/MM3 (ref 0–0.2)
BASOPHILS # BLD AUTO: 0.07 10*3/MM3 (ref 0–0.2)
BASOPHILS # BLD AUTO: 0.08 10*3/MM3 (ref 0–0.2)
BASOPHILS # BLD AUTO: 0.09 10*3/MM3 (ref 0–0.2)
BASOPHILS # BLD AUTO: 0.1 10*3/MM3 (ref 0–0.2)
BASOPHILS ABSOLUTE: 0.1 K/UL (ref 0–0.2)
BASOPHILS NFR BLD AUTO: 0.4 % (ref 0–1.5)
BASOPHILS NFR BLD AUTO: 0.7 % (ref 0–1.5)
BASOPHILS NFR BLD AUTO: 0.9 % (ref 0–1.5)
BASOPHILS NFR BLD AUTO: 0.9 % (ref 0–1.5)
BASOPHILS NFR BLD AUTO: 1.8 % (ref 0–1.5)
BASOPHILS RELATIVE PERCENT: 2 % (ref 0–1)
BDY SITE: ABNORMAL
BH CV ECHO MEAS - AO MAX PG (FULL): 9.4 MMHG
BH CV ECHO MEAS - AO MAX PG: 19 MMHG
BH CV ECHO MEAS - AO MEAN PG (FULL): 4 MMHG
BH CV ECHO MEAS - AO MEAN PG: 8 MMHG
BH CV ECHO MEAS - AO ROOT AREA (BSA CORRECTED): 1.6
BH CV ECHO MEAS - AO ROOT AREA: 6.2 CM^2
BH CV ECHO MEAS - AO ROOT DIAM: 2.8 CM
BH CV ECHO MEAS - AO V2 MAX: 218 CM/SEC
BH CV ECHO MEAS - AO V2 MEAN: 126 CM/SEC
BH CV ECHO MEAS - AO V2 VTI: 29.3 CM
BH CV ECHO MEAS - AVA(I,A): 1.8 CM^2
BH CV ECHO MEAS - AVA(I,D): 1.8 CM^2
BH CV ECHO MEAS - AVA(V,A): 1.6 CM^2
BH CV ECHO MEAS - AVA(V,D): 1.6 CM^2
BH CV ECHO MEAS - BSA(HAYCOCK): 1.8 M^2
BH CV ECHO MEAS - BSA: 1.8 M^2
BH CV ECHO MEAS - BZI_BMI: 30.4 KILOGRAMS/M^2
BH CV ECHO MEAS - BZI_METRIC_HEIGHT: 157.5 CM
BH CV ECHO MEAS - BZI_METRIC_WEIGHT: 75.3 KG
BH CV ECHO MEAS - EDV(CUBED): 62.6 ML
BH CV ECHO MEAS - EDV(MOD-SP2): 78.3 ML
BH CV ECHO MEAS - EDV(MOD-SP4): 60.3 ML
BH CV ECHO MEAS - EDV(TEICH): 68.8 ML
BH CV ECHO MEAS - EF(CUBED): 78.5 %
BH CV ECHO MEAS - EF(MOD-SP2): 57.1 %
BH CV ECHO MEAS - EF(MOD-SP4): 50.9 %
BH CV ECHO MEAS - EF(TEICH): 71.3 %
BH CV ECHO MEAS - ESV(CUBED): 13.5 ML
BH CV ECHO MEAS - ESV(MOD-SP2): 33.6 ML
BH CV ECHO MEAS - ESV(MOD-SP4): 29.6 ML
BH CV ECHO MEAS - ESV(TEICH): 19.7 ML
BH CV ECHO MEAS - FS: 40.1 %
BH CV ECHO MEAS - IVS/LVPW: 1.2
BH CV ECHO MEAS - IVSD: 1.2 CM
BH CV ECHO MEAS - LA DIMENSION: 3.2 CM
BH CV ECHO MEAS - LA/AO: 1.1
BH CV ECHO MEAS - LAT PEAK E' VEL: 4.4 CM/SEC
BH CV ECHO MEAS - LV DIASTOLIC VOL/BSA (35-75): 34.1 ML/M^2
BH CV ECHO MEAS - LV MASS(C)D: 145.9 GRAMS
BH CV ECHO MEAS - LV MASS(C)DI: 82.6 GRAMS/M^2
BH CV ECHO MEAS - LV MAX PG: 9.6 MMHG
BH CV ECHO MEAS - LV MEAN PG: 4 MMHG
BH CV ECHO MEAS - LV SYSTOLIC VOL/BSA (12-30): 16.8 ML/M^2
BH CV ECHO MEAS - LV V1 MAX: 155 CM/SEC
BH CV ECHO MEAS - LV V1 MEAN: 80.3 CM/SEC
BH CV ECHO MEAS - LV V1 VTI: 22.6 CM
BH CV ECHO MEAS - LVIDD: 4 CM
BH CV ECHO MEAS - LVIDS: 2.4 CM
BH CV ECHO MEAS - LVLD AP2: 9.5 CM
BH CV ECHO MEAS - LVLD AP4: 8.1 CM
BH CV ECHO MEAS - LVLS AP2: 7.6 CM
BH CV ECHO MEAS - LVLS AP4: 6.4 CM
BH CV ECHO MEAS - LVOT AREA (M): 2.3 CM^2
BH CV ECHO MEAS - LVOT AREA: 2.3 CM^2
BH CV ECHO MEAS - LVOT DIAM: 1.7 CM
BH CV ECHO MEAS - LVPWD: 1 CM
BH CV ECHO MEAS - MED PEAK E' VEL: 3.83 CM/SEC
BH CV ECHO MEAS - MV A MAX VEL: 64 CM/SEC
BH CV ECHO MEAS - MV DEC SLOPE: 227 CM/SEC^2
BH CV ECHO MEAS - MV DEC TIME: 0.37 SEC
BH CV ECHO MEAS - MV E MAX VEL: 87.8 CM/SEC
BH CV ECHO MEAS - MV E/A: 1.4
BH CV ECHO MEAS - MV MAX PG: 3.1 MMHG
BH CV ECHO MEAS - MV MEAN PG: 2 MMHG
BH CV ECHO MEAS - MV P1/2T MAX VEL: 91.2 CM/SEC
BH CV ECHO MEAS - MV P1/2T: 117.7 MSEC
BH CV ECHO MEAS - MV V2 MAX: 88.1 CM/SEC
BH CV ECHO MEAS - MV V2 MEAN: 64.7 CM/SEC
BH CV ECHO MEAS - MV V2 VTI: 28.2 CM
BH CV ECHO MEAS - MVA P1/2T LCG: 2.4 CM^2
BH CV ECHO MEAS - MVA(P1/2T): 1.9 CM^2
BH CV ECHO MEAS - MVA(VTI): 1.8 CM^2
BH CV ECHO MEAS - SI(AO): 102.2 ML/M^2
BH CV ECHO MEAS - SI(CUBED): 27.8 ML/M^2
BH CV ECHO MEAS - SI(LVOT): 29 ML/M^2
BH CV ECHO MEAS - SI(MOD-SP2): 25.3 ML/M^2
BH CV ECHO MEAS - SI(MOD-SP4): 17.4 ML/M^2
BH CV ECHO MEAS - SI(TEICH): 27.8 ML/M^2
BH CV ECHO MEAS - SV(AO): 180.4 ML
BH CV ECHO MEAS - SV(CUBED): 49.1 ML
BH CV ECHO MEAS - SV(LVOT): 51.3 ML
BH CV ECHO MEAS - SV(MOD-SP2): 44.7 ML
BH CV ECHO MEAS - SV(MOD-SP4): 30.7 ML
BH CV ECHO MEAS - SV(TEICH): 49 ML
BH CV ECHO MEAS - TR MAX VEL: 128 CM/SEC
BH CV ECHO MEASUREMENTS AVERAGE E/E' RATIO: 21.34
BILIRUB SERPL-MCNC: 0.3 MG/DL (ref 0–1.2)
BILIRUB SERPL-MCNC: 0.4 MG/DL (ref 0–1.2)
BILIRUB SERPL-MCNC: 0.6 MG/DL (ref 0.2–1.2)
BILIRUB UR QL STRIP: NEGATIVE
BODY TEMPERATURE: 37 C
BOTTLE TYPE: ABNORMAL
BOTTLE TYPE: ABNORMAL
BUN BLDV-MCNC: 40 MG/DL (ref 6–20)
BUN SERPL-MCNC: 23 MG/DL (ref 6–20)
BUN SERPL-MCNC: 23 MG/DL (ref 6–20)
BUN SERPL-MCNC: 26 MG/DL (ref 6–20)
BUN SERPL-MCNC: 27 MG/DL (ref 6–20)
BUN SERPL-MCNC: 28 MG/DL (ref 6–20)
BUN SERPL-MCNC: 29 MG/DL (ref 6–20)
BUN SERPL-MCNC: 29 MG/DL (ref 6–20)
BUN SERPL-MCNC: 31 MG/DL (ref 6–20)
BUN SERPL-MCNC: 33 MG/DL (ref 6–20)
BUN SERPL-MCNC: 33 MG/DL (ref 6–20)
BUN SERPL-MCNC: 35 MG/DL (ref 6–20)
BUN SERPL-MCNC: 36 MG/DL (ref 6–20)
BUN SERPL-MCNC: 38 MG/DL (ref 6–20)
BUN SERPL-MCNC: 41 MG/DL (ref 6–20)
BUN SERPL-MCNC: 42 MG/DL (ref 6–20)
BUN SERPL-MCNC: 49 MG/DL (ref 6–20)
BUN SERPL-MCNC: 49 MG/DL (ref 6–20)
BUN SERPL-MCNC: 50 MG/DL (ref 6–20)
BUN SERPL-MCNC: 51 MG/DL (ref 6–20)
BUN SERPL-MCNC: 62 MG/DL (ref 6–20)
BUN/CREAT SERPL: 10.5 (ref 7–25)
BUN/CREAT SERPL: 10.6 (ref 7–25)
BUN/CREAT SERPL: 17.7 (ref 7–25)
BUN/CREAT SERPL: 20.3 (ref 7–25)
BUN/CREAT SERPL: 20.7 (ref 7–25)
BUN/CREAT SERPL: 7.5 (ref 7–25)
BUN/CREAT SERPL: 7.7 (ref 7–25)
BUN/CREAT SERPL: 8 (ref 7–25)
BUN/CREAT SERPL: 8 (ref 7–25)
BUN/CREAT SERPL: 8.2 (ref 7–25)
BUN/CREAT SERPL: 8.2 (ref 7–25)
BUN/CREAT SERPL: 8.7 (ref 7–25)
BUN/CREAT SERPL: 8.9 (ref 7–25)
BUN/CREAT SERPL: 9 (ref 7–25)
BUN/CREAT SERPL: 9 (ref 7–25)
BUN/CREAT SERPL: 9.1 (ref 7–25)
BUN/CREAT SERPL: 9.1 (ref 7–25)
BUN/CREAT SERPL: 9.2 (ref 7–25)
BUN/CREAT SERPL: 9.4 (ref 7–25)
BUN/CREAT SERPL: 9.5 (ref 7–25)
CALCIUM SERPL-MCNC: 8.7 MG/DL (ref 8.6–10)
CALCIUM SPEC-SCNC: 10.2 MG/DL (ref 8.6–10.5)
CALCIUM SPEC-SCNC: 10.8 MG/DL (ref 8.6–10.5)
CALCIUM SPEC-SCNC: 8.1 MG/DL (ref 8.6–10.5)
CALCIUM SPEC-SCNC: 8.1 MG/DL (ref 8.6–10.5)
CALCIUM SPEC-SCNC: 8.2 MG/DL (ref 8.6–10.5)
CALCIUM SPEC-SCNC: 8.3 MG/DL (ref 8.6–10.5)
CALCIUM SPEC-SCNC: 8.4 MG/DL (ref 8.6–10.5)
CALCIUM SPEC-SCNC: 8.5 MG/DL (ref 8.6–10.5)
CALCIUM SPEC-SCNC: 8.6 MG/DL (ref 8.6–10.5)
CALCIUM SPEC-SCNC: 8.7 MG/DL (ref 8.6–10.5)
CALCIUM SPEC-SCNC: 8.8 MG/DL (ref 8.6–10.5)
CALCIUM SPEC-SCNC: 9.8 MG/DL (ref 8.6–10.5)
CARBOXYHEMOGLOBIN ARTERIAL: 3.6 % (ref 0–5)
CHLORIDE BLD-SCNC: 80 MMOL/L (ref 98–111)
CHLORIDE SERPL-SCNC: 82 MMOL/L (ref 98–107)
CHLORIDE SERPL-SCNC: 82 MMOL/L (ref 98–107)
CHLORIDE SERPL-SCNC: 83 MMOL/L (ref 98–107)
CHLORIDE SERPL-SCNC: 90 MMOL/L (ref 98–107)
CHLORIDE SERPL-SCNC: 93 MMOL/L (ref 98–107)
CHLORIDE SERPL-SCNC: 94 MMOL/L (ref 98–107)
CHLORIDE SERPL-SCNC: 95 MMOL/L (ref 98–107)
CHLORIDE SERPL-SCNC: 96 MMOL/L (ref 98–107)
CHLORIDE SERPL-SCNC: 97 MMOL/L (ref 98–107)
CHLORIDE SERPL-SCNC: 98 MMOL/L (ref 98–107)
CHLORIDE SERPL-SCNC: 99 MMOL/L (ref 98–107)
CHOLEST SERPL-MCNC: 102 MG/DL (ref 0–200)
CLARITY UR: CLEAR
CO2 SERPL-SCNC: 20 MMOL/L (ref 22–29)
CO2 SERPL-SCNC: 20 MMOL/L (ref 22–29)
CO2 SERPL-SCNC: 21 MMOL/L (ref 22–29)
CO2 SERPL-SCNC: 21 MMOL/L (ref 22–29)
CO2 SERPL-SCNC: 22 MMOL/L (ref 22–29)
CO2 SERPL-SCNC: 23 MMOL/L (ref 22–29)
CO2 SERPL-SCNC: 23 MMOL/L (ref 22–29)
CO2 SERPL-SCNC: 24 MMOL/L (ref 22–29)
CO2 SERPL-SCNC: 25 MMOL/L (ref 22–29)
CO2 SERPL-SCNC: 26 MMOL/L (ref 22–29)
CO2 SERPL-SCNC: 27 MMOL/L (ref 22–29)
CO2 SERPL-SCNC: 28 MMOL/L (ref 22–29)
CO2 SERPL-SCNC: 28 MMOL/L (ref 22–29)
CO2: 23 MMOL/L (ref 22–29)
COLOR UR: YELLOW
CREAT SERPL-MCNC: 2.32 MG/DL (ref 0.57–1)
CREAT SERPL-MCNC: 2.41 MG/DL (ref 0.57–1)
CREAT SERPL-MCNC: 2.51 MG/DL (ref 0.57–1)
CREAT SERPL-MCNC: 2.91 MG/DL (ref 0.57–1)
CREAT SERPL-MCNC: 2.99 MG/DL (ref 0.57–1)
CREAT SERPL-MCNC: 3.06 MG/DL (ref 0.57–1)
CREAT SERPL-MCNC: 3.09 MG/DL (ref 0.57–1)
CREAT SERPL-MCNC: 3.24 MG/DL (ref 0.57–1)
CREAT SERPL-MCNC: 3.49 MG/DL (ref 0.57–1)
CREAT SERPL-MCNC: 3.52 MG/DL (ref 0.57–1)
CREAT SERPL-MCNC: 3.62 MG/DL (ref 0.57–1)
CREAT SERPL-MCNC: 3.63 MG/DL (ref 0.57–1)
CREAT SERPL-MCNC: 3.88 MG/DL (ref 0.57–1)
CREAT SERPL-MCNC: 4.01 MG/DL (ref 0.57–1)
CREAT SERPL-MCNC: 4.28 MG/DL (ref 0.57–1)
CREAT SERPL-MCNC: 4.3 MG/DL (ref 0.5–0.9)
CREAT SERPL-MCNC: 4.61 MG/DL (ref 0.57–1)
CREAT SERPL-MCNC: 4.66 MG/DL (ref 0.57–1)
CREAT SERPL-MCNC: 4.8 MG/DL (ref 0.57–1)
CREAT SERPL-MCNC: 4.82 MG/DL (ref 0.57–1)
CREAT SERPL-MCNC: 5.26 MG/DL (ref 0.57–1)
CRP SERPL-MCNC: 8.03 MG/DL (ref 0–0.5)
D-LACTATE SERPL-SCNC: 1.7 MMOL/L (ref 0.5–2)
DEPRECATED RDW RBC AUTO: 55.2 FL (ref 37–54)
DEPRECATED RDW RBC AUTO: 56.2 FL (ref 37–54)
DEPRECATED RDW RBC AUTO: 56.5 FL (ref 37–54)
DEPRECATED RDW RBC AUTO: 58.1 FL (ref 37–54)
DEPRECATED RDW RBC AUTO: 58.3 FL (ref 37–54)
DEPRECATED RDW RBC AUTO: 58.7 FL (ref 37–54)
DEPRECATED RDW RBC AUTO: 59.7 FL (ref 37–54)
DEPRECATED RDW RBC AUTO: 59.9 FL (ref 37–54)
DEPRECATED RDW RBC AUTO: 60.6 FL (ref 37–54)
DEPRECATED RDW RBC AUTO: 61.1 FL (ref 37–54)
DEPRECATED RDW RBC AUTO: 62.2 FL (ref 37–54)
DEPRECATED RDW RBC AUTO: 62.3 FL (ref 37–54)
DEPRECATED RDW RBC AUTO: 63 FL (ref 37–54)
EGFRCR SERPLBLD CKD-EPI 2021: 10.2 ML/MIN/1.73
EGFRCR SERPLBLD CKD-EPI 2021: 10.3 ML/MIN/1.73
EGFRCR SERPLBLD CKD-EPI 2021: 10.6 ML/MIN/1.73
EGFRCR SERPLBLD CKD-EPI 2021: 11.8 ML/MIN/1.73
EGFRCR SERPLBLD CKD-EPI 2021: 12.8 ML/MIN/1.73
EGFRCR SERPLBLD CKD-EPI 2021: 13.3 ML/MIN/1.73
EGFRCR SERPLBLD CKD-EPI 2021: 14.4 ML/MIN/1.73
EGFRCR SERPLBLD CKD-EPI 2021: 14.4 ML/MIN/1.73
EGFRCR SERPLBLD CKD-EPI 2021: 14.9 ML/MIN/1.73
EGFRCR SERPLBLD CKD-EPI 2021: 15.1 ML/MIN/1.73
EGFRCR SERPLBLD CKD-EPI 2021: 16.5 ML/MIN/1.73
EGFRCR SERPLBLD CKD-EPI 2021: 17.4 ML/MIN/1.73
EGFRCR SERPLBLD CKD-EPI 2021: 17.6 ML/MIN/1.73
EGFRCR SERPLBLD CKD-EPI 2021: 18.1 ML/MIN/1.73
EGFRCR SERPLBLD CKD-EPI 2021: 18.7 ML/MIN/1.73
EGFRCR SERPLBLD CKD-EPI 2021: 22.4 ML/MIN/1.73
EGFRCR SERPLBLD CKD-EPI 2021: 23.5 ML/MIN/1.73
EGFRCR SERPLBLD CKD-EPI 2021: 24.6 ML/MIN/1.73
EGFRCR SERPLBLD CKD-EPI 2021: 9.2 ML/MIN/1.73
EOSINOPHIL # BLD AUTO: 0.02 10*3/MM3 (ref 0–0.4)
EOSINOPHIL # BLD AUTO: 0.07 10*3/MM3 (ref 0–0.4)
EOSINOPHIL # BLD AUTO: 0.22 10*3/MM3 (ref 0–0.4)
EOSINOPHIL # BLD AUTO: 0.24 10*3/MM3 (ref 0–0.4)
EOSINOPHIL # BLD AUTO: 0.27 10*3/MM3 (ref 0–0.4)
EOSINOPHIL NFR BLD AUTO: 0.1 % (ref 0.3–6.2)
EOSINOPHIL NFR BLD AUTO: 0.9 % (ref 0.3–6.2)
EOSINOPHIL NFR BLD AUTO: 2.6 % (ref 0.3–6.2)
EOSINOPHIL NFR BLD AUTO: 3.1 % (ref 0.3–6.2)
EOSINOPHIL NFR BLD AUTO: 5.5 % (ref 0.3–6.2)
EOSINOPHILS ABSOLUTE: 0.2 K/UL (ref 0–0.6)
EOSINOPHILS RELATIVE PERCENT: 4.7 % (ref 0–5)
ERYTHROCYTE [DISTWIDTH] IN BLOOD BY AUTOMATED COUNT: 16.1 % (ref 12.3–15.4)
ERYTHROCYTE [DISTWIDTH] IN BLOOD BY AUTOMATED COUNT: 16.3 % (ref 12.3–15.4)
ERYTHROCYTE [DISTWIDTH] IN BLOOD BY AUTOMATED COUNT: 16.3 % (ref 12.3–15.4)
ERYTHROCYTE [DISTWIDTH] IN BLOOD BY AUTOMATED COUNT: 16.9 % (ref 12.3–15.4)
ERYTHROCYTE [DISTWIDTH] IN BLOOD BY AUTOMATED COUNT: 17.1 % (ref 12.3–15.4)
ERYTHROCYTE [DISTWIDTH] IN BLOOD BY AUTOMATED COUNT: 17.3 % (ref 12.3–15.4)
ERYTHROCYTE [DISTWIDTH] IN BLOOD BY AUTOMATED COUNT: 17.4 % (ref 12.3–15.4)
ERYTHROCYTE [DISTWIDTH] IN BLOOD BY AUTOMATED COUNT: 17.7 % (ref 12.3–15.4)
ERYTHROCYTE [DISTWIDTH] IN BLOOD BY AUTOMATED COUNT: 18 % (ref 12.3–15.4)
ERYTHROCYTE [DISTWIDTH] IN BLOOD BY AUTOMATED COUNT: 18.2 % (ref 12.3–15.4)
ERYTHROCYTE [DISTWIDTH] IN BLOOD BY AUTOMATED COUNT: 18.6 % (ref 12.3–15.4)
GENTAMICIN SERPL-MCNC: 3.6 MCG/ML (ref 0.5–10)
GFR AFRICAN AMERICAN: 13
GFR NON-AFRICAN AMERICAN: 11
GFR SERPL CREATININE-BSD FRML MDRD: 10 ML/MIN/1.73
GFR SERPL CREATININE-BSD FRML MDRD: ABNORMAL ML/MIN/{1.73_M2}
GLOBULIN UR ELPH-MCNC: 2.6 GM/DL
GLOBULIN UR ELPH-MCNC: 2.7 GM/DL
GLOBULIN UR ELPH-MCNC: 2.7 GM/DL
GLOBULIN UR ELPH-MCNC: 3.2 GM/DL
GLOBULIN UR ELPH-MCNC: 3.8 GM/DL
GLOBULIN UR ELPH-MCNC: 4.4 GM/DL
GLUCOSE BLD-MCNC: 879 MG/DL (ref 74–109)
GLUCOSE BLDC GLUCOMTR-MCNC: 100 MG/DL (ref 70–130)
GLUCOSE BLDC GLUCOMTR-MCNC: 101 MG/DL (ref 70–130)
GLUCOSE BLDC GLUCOMTR-MCNC: 102 MG/DL (ref 70–130)
GLUCOSE BLDC GLUCOMTR-MCNC: 103 MG/DL (ref 70–130)
GLUCOSE BLDC GLUCOMTR-MCNC: 107 MG/DL (ref 70–130)
GLUCOSE BLDC GLUCOMTR-MCNC: 113 MG/DL (ref 70–130)
GLUCOSE BLDC GLUCOMTR-MCNC: 118 MG/DL (ref 70–130)
GLUCOSE BLDC GLUCOMTR-MCNC: 120 MG/DL (ref 70–130)
GLUCOSE BLDC GLUCOMTR-MCNC: 121 MG/DL (ref 70–130)
GLUCOSE BLDC GLUCOMTR-MCNC: 121 MG/DL (ref 70–130)
GLUCOSE BLDC GLUCOMTR-MCNC: 122 MG/DL (ref 70–130)
GLUCOSE BLDC GLUCOMTR-MCNC: 122 MG/DL (ref 70–130)
GLUCOSE BLDC GLUCOMTR-MCNC: 123 MG/DL (ref 70–130)
GLUCOSE BLDC GLUCOMTR-MCNC: 125 MG/DL (ref 70–130)
GLUCOSE BLDC GLUCOMTR-MCNC: 127 MG/DL (ref 70–130)
GLUCOSE BLDC GLUCOMTR-MCNC: 130 MG/DL (ref 70–130)
GLUCOSE BLDC GLUCOMTR-MCNC: 137 MG/DL (ref 70–130)
GLUCOSE BLDC GLUCOMTR-MCNC: 140 MG/DL (ref 70–130)
GLUCOSE BLDC GLUCOMTR-MCNC: 148 MG/DL (ref 70–130)
GLUCOSE BLDC GLUCOMTR-MCNC: 150 MG/DL (ref 70–130)
GLUCOSE BLDC GLUCOMTR-MCNC: 152 MG/DL (ref 70–130)
GLUCOSE BLDC GLUCOMTR-MCNC: 153 MG/DL (ref 70–130)
GLUCOSE BLDC GLUCOMTR-MCNC: 155 MG/DL (ref 70–130)
GLUCOSE BLDC GLUCOMTR-MCNC: 156 MG/DL (ref 70–130)
GLUCOSE BLDC GLUCOMTR-MCNC: 156 MG/DL (ref 70–130)
GLUCOSE BLDC GLUCOMTR-MCNC: 157 MG/DL (ref 70–130)
GLUCOSE BLDC GLUCOMTR-MCNC: 159 MG/DL (ref 70–130)
GLUCOSE BLDC GLUCOMTR-MCNC: 165 MG/DL (ref 70–130)
GLUCOSE BLDC GLUCOMTR-MCNC: 167 MG/DL (ref 70–130)
GLUCOSE BLDC GLUCOMTR-MCNC: 168 MG/DL (ref 70–130)
GLUCOSE BLDC GLUCOMTR-MCNC: 169 MG/DL (ref 70–130)
GLUCOSE BLDC GLUCOMTR-MCNC: 170 MG/DL (ref 70–130)
GLUCOSE BLDC GLUCOMTR-MCNC: 170 MG/DL (ref 70–130)
GLUCOSE BLDC GLUCOMTR-MCNC: 172 MG/DL (ref 70–130)
GLUCOSE BLDC GLUCOMTR-MCNC: 173 MG/DL (ref 70–130)
GLUCOSE BLDC GLUCOMTR-MCNC: 178 MG/DL (ref 70–130)
GLUCOSE BLDC GLUCOMTR-MCNC: 179 MG/DL (ref 70–130)
GLUCOSE BLDC GLUCOMTR-MCNC: 184 MG/DL (ref 70–130)
GLUCOSE BLDC GLUCOMTR-MCNC: 185 MG/DL (ref 70–130)
GLUCOSE BLDC GLUCOMTR-MCNC: 186 MG/DL (ref 70–130)
GLUCOSE BLDC GLUCOMTR-MCNC: 188 MG/DL (ref 70–130)
GLUCOSE BLDC GLUCOMTR-MCNC: 190 MG/DL (ref 70–130)
GLUCOSE BLDC GLUCOMTR-MCNC: 196 MG/DL (ref 70–130)
GLUCOSE BLDC GLUCOMTR-MCNC: 201 MG/DL (ref 70–130)
GLUCOSE BLDC GLUCOMTR-MCNC: 203 MG/DL (ref 70–130)
GLUCOSE BLDC GLUCOMTR-MCNC: 205 MG/DL (ref 70–130)
GLUCOSE BLDC GLUCOMTR-MCNC: 205 MG/DL (ref 70–130)
GLUCOSE BLDC GLUCOMTR-MCNC: 211 MG/DL (ref 70–130)
GLUCOSE BLDC GLUCOMTR-MCNC: 211 MG/DL (ref 70–130)
GLUCOSE BLDC GLUCOMTR-MCNC: 212 MG/DL (ref 70–130)
GLUCOSE BLDC GLUCOMTR-MCNC: 213 MG/DL (ref 70–130)
GLUCOSE BLDC GLUCOMTR-MCNC: 214 MG/DL (ref 70–130)
GLUCOSE BLDC GLUCOMTR-MCNC: 214 MG/DL (ref 70–130)
GLUCOSE BLDC GLUCOMTR-MCNC: 219 MG/DL (ref 70–130)
GLUCOSE BLDC GLUCOMTR-MCNC: 219 MG/DL (ref 70–130)
GLUCOSE BLDC GLUCOMTR-MCNC: 220 MG/DL (ref 70–130)
GLUCOSE BLDC GLUCOMTR-MCNC: 225 MG/DL (ref 70–130)
GLUCOSE BLDC GLUCOMTR-MCNC: 229 MG/DL (ref 70–130)
GLUCOSE BLDC GLUCOMTR-MCNC: 248 MG/DL (ref 70–130)
GLUCOSE BLDC GLUCOMTR-MCNC: 252 MG/DL (ref 70–130)
GLUCOSE BLDC GLUCOMTR-MCNC: 255 MG/DL (ref 70–130)
GLUCOSE BLDC GLUCOMTR-MCNC: 256 MG/DL (ref 70–130)
GLUCOSE BLDC GLUCOMTR-MCNC: 257 MG/DL (ref 70–130)
GLUCOSE BLDC GLUCOMTR-MCNC: 260 MG/DL (ref 70–130)
GLUCOSE BLDC GLUCOMTR-MCNC: 265 MG/DL (ref 70–130)
GLUCOSE BLDC GLUCOMTR-MCNC: 279 MG/DL (ref 70–130)
GLUCOSE BLDC GLUCOMTR-MCNC: 28 MG/DL (ref 70–130)
GLUCOSE BLDC GLUCOMTR-MCNC: 29 MG/DL (ref 70–130)
GLUCOSE BLDC GLUCOMTR-MCNC: 304 MG/DL (ref 70–130)
GLUCOSE BLDC GLUCOMTR-MCNC: 305 MG/DL (ref 70–130)
GLUCOSE BLDC GLUCOMTR-MCNC: 307 MG/DL (ref 70–130)
GLUCOSE BLDC GLUCOMTR-MCNC: 313 MG/DL (ref 70–130)
GLUCOSE BLDC GLUCOMTR-MCNC: 314 MG/DL (ref 70–130)
GLUCOSE BLDC GLUCOMTR-MCNC: 32 MG/DL (ref 70–130)
GLUCOSE BLDC GLUCOMTR-MCNC: 326 MG/DL (ref 70–130)
GLUCOSE BLDC GLUCOMTR-MCNC: 327 MG/DL (ref 70–130)
GLUCOSE BLDC GLUCOMTR-MCNC: 329 MG/DL (ref 70–130)
GLUCOSE BLDC GLUCOMTR-MCNC: 332 MG/DL (ref 70–130)
GLUCOSE BLDC GLUCOMTR-MCNC: 334 MG/DL (ref 70–130)
GLUCOSE BLDC GLUCOMTR-MCNC: 336 MG/DL (ref 70–130)
GLUCOSE BLDC GLUCOMTR-MCNC: 356 MG/DL (ref 70–130)
GLUCOSE BLDC GLUCOMTR-MCNC: 358 MG/DL (ref 70–130)
GLUCOSE BLDC GLUCOMTR-MCNC: 359 MG/DL (ref 70–130)
GLUCOSE BLDC GLUCOMTR-MCNC: 363 MG/DL (ref 70–130)
GLUCOSE BLDC GLUCOMTR-MCNC: 375 MG/DL (ref 70–130)
GLUCOSE BLDC GLUCOMTR-MCNC: 377 MG/DL (ref 70–130)
GLUCOSE BLDC GLUCOMTR-MCNC: 391 MG/DL (ref 70–130)
GLUCOSE BLDC GLUCOMTR-MCNC: 409 MG/DL (ref 70–130)
GLUCOSE BLDC GLUCOMTR-MCNC: 42 MG/DL (ref 70–130)
GLUCOSE BLDC GLUCOMTR-MCNC: 427 MG/DL (ref 70–130)
GLUCOSE BLDC GLUCOMTR-MCNC: 427 MG/DL (ref 70–130)
GLUCOSE BLDC GLUCOMTR-MCNC: 43 MG/DL (ref 70–130)
GLUCOSE BLDC GLUCOMTR-MCNC: 448 MG/DL (ref 70–130)
GLUCOSE BLDC GLUCOMTR-MCNC: 462 MG/DL (ref 70–130)
GLUCOSE BLDC GLUCOMTR-MCNC: 468 MG/DL (ref 70–130)
GLUCOSE BLDC GLUCOMTR-MCNC: 49 MG/DL (ref 70–130)
GLUCOSE BLDC GLUCOMTR-MCNC: 497 MG/DL (ref 70–130)
GLUCOSE BLDC GLUCOMTR-MCNC: 50 MG/DL (ref 70–130)
GLUCOSE BLDC GLUCOMTR-MCNC: 52 MG/DL (ref 70–130)
GLUCOSE BLDC GLUCOMTR-MCNC: 531 MG/DL (ref 70–130)
GLUCOSE BLDC GLUCOMTR-MCNC: 54 MG/DL (ref 70–130)
GLUCOSE BLDC GLUCOMTR-MCNC: 55 MG/DL (ref 70–130)
GLUCOSE BLDC GLUCOMTR-MCNC: 56 MG/DL (ref 70–130)
GLUCOSE BLDC GLUCOMTR-MCNC: 57 MG/DL (ref 70–130)
GLUCOSE BLDC GLUCOMTR-MCNC: 59 MG/DL (ref 70–130)
GLUCOSE BLDC GLUCOMTR-MCNC: 61 MG/DL (ref 70–130)
GLUCOSE BLDC GLUCOMTR-MCNC: 63 MG/DL (ref 70–130)
GLUCOSE BLDC GLUCOMTR-MCNC: 63 MG/DL (ref 70–130)
GLUCOSE BLDC GLUCOMTR-MCNC: 64 MG/DL (ref 70–130)
GLUCOSE BLDC GLUCOMTR-MCNC: 65 MG/DL (ref 70–130)
GLUCOSE BLDC GLUCOMTR-MCNC: 65 MG/DL (ref 70–130)
GLUCOSE BLDC GLUCOMTR-MCNC: 66 MG/DL (ref 70–130)
GLUCOSE BLDC GLUCOMTR-MCNC: 68 MG/DL (ref 70–130)
GLUCOSE BLDC GLUCOMTR-MCNC: 69 MG/DL (ref 70–130)
GLUCOSE BLDC GLUCOMTR-MCNC: 69 MG/DL (ref 70–130)
GLUCOSE BLDC GLUCOMTR-MCNC: 73 MG/DL (ref 70–130)
GLUCOSE BLDC GLUCOMTR-MCNC: 80 MG/DL (ref 70–130)
GLUCOSE BLDC GLUCOMTR-MCNC: 81 MG/DL (ref 70–130)
GLUCOSE BLDC GLUCOMTR-MCNC: 85 MG/DL (ref 70–130)
GLUCOSE BLDC GLUCOMTR-MCNC: 89 MG/DL (ref 70–130)
GLUCOSE BLDC GLUCOMTR-MCNC: 90 MG/DL (ref 70–130)
GLUCOSE BLDC GLUCOMTR-MCNC: 91 MG/DL (ref 70–130)
GLUCOSE BLDC GLUCOMTR-MCNC: 92 MG/DL (ref 70–130)
GLUCOSE BLDC GLUCOMTR-MCNC: 95 MG/DL (ref 70–130)
GLUCOSE BLDC GLUCOMTR-MCNC: 96 MG/DL (ref 70–130)
GLUCOSE BLDC GLUCOMTR-MCNC: 97 MG/DL (ref 70–130)
GLUCOSE BLDC GLUCOMTR-MCNC: 97 MG/DL (ref 70–130)
GLUCOSE SERPL-MCNC: 117 MG/DL (ref 65–99)
GLUCOSE SERPL-MCNC: 121 MG/DL (ref 65–99)
GLUCOSE SERPL-MCNC: 144 MG/DL (ref 65–99)
GLUCOSE SERPL-MCNC: 180 MG/DL (ref 65–99)
GLUCOSE SERPL-MCNC: 180 MG/DL (ref 65–99)
GLUCOSE SERPL-MCNC: 188 MG/DL (ref 65–99)
GLUCOSE SERPL-MCNC: 193 MG/DL (ref 65–99)
GLUCOSE SERPL-MCNC: 298 MG/DL (ref 65–99)
GLUCOSE SERPL-MCNC: 324 MG/DL (ref 65–99)
GLUCOSE SERPL-MCNC: 347 MG/DL (ref 65–99)
GLUCOSE SERPL-MCNC: 378 MG/DL (ref 65–99)
GLUCOSE SERPL-MCNC: 443 MG/DL (ref 65–99)
GLUCOSE SERPL-MCNC: 52 MG/DL (ref 65–99)
GLUCOSE SERPL-MCNC: 531 MG/DL (ref 65–99)
GLUCOSE SERPL-MCNC: 66 MG/DL (ref 65–99)
GLUCOSE SERPL-MCNC: 88 MG/DL (ref 65–99)
GLUCOSE SERPL-MCNC: 921 MG/DL (ref 65–99)
GLUCOSE SERPL-MCNC: 937 MG/DL (ref 65–99)
GLUCOSE SERPL-MCNC: 95 MG/DL (ref 65–99)
GLUCOSE SERPL-MCNC: 95 MG/DL (ref 65–99)
GLUCOSE SERPL-MCNC: 972 MG/DL (ref 65–99)
GLUCOSE UR STRIP-MCNC: ABNORMAL MG/DL
GRAM STN SPEC: ABNORMAL
GRAM STN SPEC: NORMAL
HBA1C MFR BLD: 9.7 % (ref 4.8–5.6)
HCO3 ARTERIAL: 23.7 MMOL/L (ref 22–26)
HCO3 BLDA-SCNC: 18.2 MMOL/L (ref 20–26)
HCO3 BLDA-SCNC: 22.4 MMOL/L (ref 20–26)
HCO3 BLDA-SCNC: 23.2 MMOL/L (ref 20–26)
HCO3 BLDA-SCNC: 28.2 MMOL/L (ref 20–26)
HCT VFR BLD AUTO: 24.2 % (ref 34–46.6)
HCT VFR BLD AUTO: 24.7 % (ref 34–46.6)
HCT VFR BLD AUTO: 24.8 % (ref 34–46.6)
HCT VFR BLD AUTO: 26.3 % (ref 34–46.6)
HCT VFR BLD AUTO: 26.8 % (ref 34–46.6)
HCT VFR BLD AUTO: 27 % (ref 34–46.6)
HCT VFR BLD AUTO: 27.3 % (ref 34–46.6)
HCT VFR BLD AUTO: 27.4 % (ref 34–46.6)
HCT VFR BLD AUTO: 27.7 % (ref 34–46.6)
HCT VFR BLD AUTO: 27.9 % (ref 34–46.6)
HCT VFR BLD AUTO: 28.6 % (ref 34–46.6)
HCT VFR BLD AUTO: 28.7 % (ref 34–46.6)
HCT VFR BLD AUTO: 33.7 % (ref 34–46.6)
HCT VFR BLD CALC: 33.2 % (ref 37–47)
HDLC SERPL-MCNC: 48 MG/DL (ref 40–60)
HEMOGLOBIN, ART, EXTENDED: 9.3 G/DL (ref 12–16)
HEMOGLOBIN: 10 G/DL (ref 12–16)
HGB BLD-MCNC: 10.7 G/DL (ref 12–15.9)
HGB BLD-MCNC: 7.1 G/DL (ref 12–15.9)
HGB BLD-MCNC: 7.6 G/DL (ref 12–15.9)
HGB BLD-MCNC: 7.7 G/DL (ref 12–15.9)
HGB BLD-MCNC: 7.9 G/DL (ref 12–15.9)
HGB BLD-MCNC: 8 G/DL (ref 12–15.9)
HGB BLD-MCNC: 8.1 G/DL (ref 12–15.9)
HGB BLD-MCNC: 8.1 G/DL (ref 12–15.9)
HGB BLD-MCNC: 8.3 G/DL (ref 12–15.9)
HGB BLD-MCNC: 8.3 G/DL (ref 12–15.9)
HGB BLD-MCNC: 8.4 G/DL (ref 12–15.9)
HGB BLD-MCNC: 8.5 G/DL (ref 12–15.9)
HGB BLD-MCNC: 9.1 G/DL (ref 12–15.9)
HGB UR QL STRIP.AUTO: ABNORMAL
HYALINE CASTS UR QL AUTO: ABNORMAL /LPF
HYPOCHROMIA BLD QL: ABNORMAL
IMM GRANULOCYTES # BLD AUTO: 0.01 10*3/MM3 (ref 0–0.05)
IMM GRANULOCYTES # BLD AUTO: 0.02 10*3/MM3 (ref 0–0.05)
IMM GRANULOCYTES # BLD AUTO: 0.02 10*3/MM3 (ref 0–0.05)
IMM GRANULOCYTES # BLD AUTO: 0.03 10*3/MM3 (ref 0–0.05)
IMM GRANULOCYTES # BLD AUTO: 0.17 10*3/MM3 (ref 0–0.05)
IMM GRANULOCYTES NFR BLD AUTO: 0.2 % (ref 0–0.5)
IMM GRANULOCYTES NFR BLD AUTO: 0.2 % (ref 0–0.5)
IMM GRANULOCYTES NFR BLD AUTO: 0.3 % (ref 0–0.5)
IMM GRANULOCYTES NFR BLD AUTO: 0.3 % (ref 0–0.5)
IMM GRANULOCYTES NFR BLD AUTO: 0.6 % (ref 0–0.5)
IMMATURE GRANULOCYTES #: 0 K/UL
INHALED O2 CONCENTRATION: 100 %
INHALED O2 CONCENTRATION: 100 %
INHALED O2 CONCENTRATION: 30 %
INHALED O2 CONCENTRATION: 40 %
INR BLD: 0.96 (ref 0.88–1.18)
INR PPP: 1.02 (ref 0.91–1.09)
ISOLATED FROM: ABNORMAL
KETONES UR QL STRIP: NEGATIVE
LACTIC ACID: 0.8 MMOL/L (ref 0.5–1.9)
LDLC SERPL CALC-MCNC: 44 MG/DL (ref 0–100)
LDLC/HDLC SERPL: 0.98 {RATIO}
LEFT ATRIUM VOLUME: 92 CM3
LEUKOCYTE ESTERASE UR QL STRIP.AUTO: ABNORMAL
LYMPHOCYTES # BLD AUTO: 0.44 10*3/MM3 (ref 0.7–3.1)
LYMPHOCYTES # BLD AUTO: 0.71 10*3/MM3 (ref 0.7–3.1)
LYMPHOCYTES # BLD AUTO: 0.77 10*3/MM3 (ref 0.7–3.1)
LYMPHOCYTES # BLD AUTO: 0.97 10*3/MM3 (ref 0.7–3.1)
LYMPHOCYTES # BLD AUTO: 0.99 10*3/MM3 (ref 0.7–3.1)
LYMPHOCYTES # BLD MANUAL: 3.35 10*3/MM3 (ref 0.7–3.1)
LYMPHOCYTES ABSOLUTE: 0.9 K/UL (ref 1.1–4.5)
LYMPHOCYTES NFR BLD AUTO: 13.8 % (ref 19.6–45.3)
LYMPHOCYTES NFR BLD AUTO: 2.7 % (ref 19.6–45.3)
LYMPHOCYTES NFR BLD AUTO: 22 % (ref 19.6–45.3)
LYMPHOCYTES NFR BLD AUTO: 5.4 % (ref 19.6–45.3)
LYMPHOCYTES NFR BLD AUTO: 7.5 % (ref 19.6–45.3)
LYMPHOCYTES NFR BLD MANUAL: 9.3 % (ref 5–12)
LYMPHOCYTES RELATIVE PERCENT: 19.2 % (ref 20–40)
Lab: ABNORMAL
MAGNESIUM SERPL-MCNC: 2 MG/DL (ref 1.6–2.6)
MAGNESIUM SERPL-MCNC: 2.1 MG/DL (ref 1.6–2.6)
MAGNESIUM SERPL-MCNC: 2.4 MG/DL (ref 1.6–2.6)
MAXIMAL PREDICTED HEART RATE: 167 BPM
MCH RBC QN AUTO: 27.9 PG (ref 26.6–33)
MCH RBC QN AUTO: 28 PG (ref 26.6–33)
MCH RBC QN AUTO: 28 PG (ref 26.6–33)
MCH RBC QN AUTO: 28.1 PG (ref 26.6–33)
MCH RBC QN AUTO: 28.2 PG (ref 26.6–33)
MCH RBC QN AUTO: 28.3 PG (ref 26.6–33)
MCH RBC QN AUTO: 28.3 PG (ref 26.6–33)
MCH RBC QN AUTO: 28.4 PG (ref 26.6–33)
MCH RBC QN AUTO: 28.4 PG (ref 26.6–33)
MCH RBC QN AUTO: 28.4 PG (ref 27–31)
MCH RBC QN AUTO: 28.8 PG (ref 26.6–33)
MCH RBC QN AUTO: 29 PG (ref 26.6–33)
MCH RBC QN AUTO: 29.1 PG (ref 26.6–33)
MCH RBC QN AUTO: 29.4 PG (ref 26.6–33)
MCHC RBC AUTO-ENTMCNC: 29.2 G/DL (ref 31.5–35.7)
MCHC RBC AUTO-ENTMCNC: 29.3 G/DL (ref 31.5–35.7)
MCHC RBC AUTO-ENTMCNC: 29.4 G/DL (ref 31.5–35.7)
MCHC RBC AUTO-ENTMCNC: 29.5 G/DL (ref 31.5–35.7)
MCHC RBC AUTO-ENTMCNC: 29.7 G/DL (ref 31.5–35.7)
MCHC RBC AUTO-ENTMCNC: 29.7 G/DL (ref 31.5–35.7)
MCHC RBC AUTO-ENTMCNC: 30 G/DL (ref 31.5–35.7)
MCHC RBC AUTO-ENTMCNC: 30.1 G/DL (ref 33–37)
MCHC RBC AUTO-ENTMCNC: 30.7 G/DL (ref 31.5–35.7)
MCHC RBC AUTO-ENTMCNC: 30.8 G/DL (ref 31.5–35.7)
MCHC RBC AUTO-ENTMCNC: 31 G/DL (ref 31.5–35.7)
MCHC RBC AUTO-ENTMCNC: 31.6 G/DL (ref 31.5–35.7)
MCHC RBC AUTO-ENTMCNC: 31.7 G/DL (ref 31.5–35.7)
MCHC RBC AUTO-ENTMCNC: 31.8 G/DL (ref 31.5–35.7)
MCV RBC AUTO: 89.7 FL (ref 79–97)
MCV RBC AUTO: 90.1 FL (ref 79–97)
MCV RBC AUTO: 91.5 FL (ref 79–97)
MCV RBC AUTO: 91.6 FL (ref 79–97)
MCV RBC AUTO: 92.9 FL (ref 79–97)
MCV RBC AUTO: 94.3 FL (ref 81–99)
MCV RBC AUTO: 94.4 FL (ref 79–97)
MCV RBC AUTO: 94.5 FL (ref 79–97)
MCV RBC AUTO: 94.9 FL (ref 79–97)
MCV RBC AUTO: 95.3 FL (ref 79–97)
MCV RBC AUTO: 95.5 FL (ref 79–97)
MCV RBC AUTO: 95.8 FL (ref 79–97)
MCV RBC AUTO: 96.1 FL (ref 79–97)
MCV RBC AUTO: 98.8 FL (ref 79–97)
METHEMOGLOBIN ARTERIAL: 0.8 %
MODALITY: ABNORMAL
MONOCYTES # BLD AUTO: 0.34 10*3/MM3 (ref 0.1–0.9)
MONOCYTES # BLD AUTO: 0.37 10*3/MM3 (ref 0.1–0.9)
MONOCYTES # BLD AUTO: 0.4 10*3/MM3 (ref 0.1–0.9)
MONOCYTES # BLD AUTO: 0.93 10*3/MM3 (ref 0.1–0.9)
MONOCYTES # BLD AUTO: 1.54 10*3/MM3 (ref 0.1–0.9)
MONOCYTES # BLD: 3.06 10*3/MM3 (ref 0.1–0.9)
MONOCYTES ABSOLUTE: 0.4 K/UL (ref 0–0.9)
MONOCYTES NFR BLD AUTO: 4.7 % (ref 5–12)
MONOCYTES NFR BLD AUTO: 4.9 % (ref 5–12)
MONOCYTES NFR BLD AUTO: 5.8 % (ref 5–12)
MONOCYTES NFR BLD AUTO: 8.4 % (ref 5–12)
MONOCYTES NFR BLD AUTO: 9.1 % (ref 5–12)
MONOCYTES RELATIVE PERCENT: 8.5 % (ref 0–10)
NEUTROPHILS # BLD AUTO: 26.49 10*3/MM3 (ref 1.7–7)
NEUTROPHILS ABSOLUTE: 2.9 K/UL (ref 1.5–7.5)
NEUTROPHILS NFR BLD AUTO: 2.73 10*3/MM3 (ref 1.7–7)
NEUTROPHILS NFR BLD AUTO: 24.17 10*3/MM3 (ref 1.7–7)
NEUTROPHILS NFR BLD AUTO: 5.55 10*3/MM3 (ref 1.7–7)
NEUTROPHILS NFR BLD AUTO: 62.1 % (ref 42.7–76)
NEUTROPHILS NFR BLD AUTO: 7.12 10*3/MM3 (ref 1.7–7)
NEUTROPHILS NFR BLD AUTO: 77.4 % (ref 42.7–76)
NEUTROPHILS NFR BLD AUTO: 79.6 % (ref 42.7–76)
NEUTROPHILS NFR BLD AUTO: 8.16 10*3/MM3 (ref 1.7–7)
NEUTROPHILS NFR BLD AUTO: 87.7 % (ref 42.7–76)
NEUTROPHILS NFR BLD AUTO: 90.4 % (ref 42.7–76)
NEUTROPHILS NFR BLD MANUAL: 75 % (ref 42.7–76)
NEUTROPHILS RELATIVE PERCENT: 65.2 % (ref 50–65)
NEUTS BAND NFR BLD MANUAL: 5.6 % (ref 0–5)
NEUTS VAC BLD QL SMEAR: ABNORMAL
NITRITE UR QL STRIP: NEGATIVE
NRBC BLD AUTO-RTO: 0 /100 WBC (ref 0–0.2)
NT-PROBNP SERPL-MCNC: ABNORMAL PG/ML (ref 0–900)
O2 CONTENT ARTERIAL: 11.9 ML/DL
O2 SAT, ARTERIAL: 90.8 %
O2 THERAPY: ABNORMAL
OSMOLALITY SERPL: 323 MOSM/KG (ref 275–295)
PCO2 ARTERIAL: 44 MMHG (ref 35–45)
PCO2 BLDA: 39.5 MM HG (ref 35–45)
PCO2 BLDA: 43.3 MM HG (ref 35–45)
PCO2 BLDA: 43.3 MM HG (ref 35–45)
PCO2 BLDA: 55.3 MM HG (ref 35–45)
PCO2 TEMP ADJ BLD: 39.5 MM HG (ref 35–45)
PCO2 TEMP ADJ BLD: 43.3 MM HG (ref 35–45)
PCO2 TEMP ADJ BLD: 43.3 MM HG (ref 35–45)
PCO2 TEMP ADJ BLD: 55.3 MM HG (ref 35–45)
PDW BLD-RTO: 15 % (ref 11.5–14.5)
PEEP RESPIRATORY: 5 CM[H2O]
PH ARTERIAL: 7.34 (ref 7.35–7.45)
PH BLDA: 7.23 PH UNITS (ref 7.35–7.45)
PH BLDA: 7.27 PH UNITS (ref 7.35–7.45)
PH BLDA: 7.32 PH UNITS (ref 7.35–7.45)
PH BLDA: 7.42 PH UNITS (ref 7.35–7.45)
PH UR STRIP.AUTO: 6.5 [PH] (ref 5–8)
PH, TEMP CORRECTED: 7.23 PH UNITS (ref 7.35–7.45)
PH, TEMP CORRECTED: 7.27 PH UNITS (ref 7.35–7.45)
PH, TEMP CORRECTED: 7.32 PH UNITS (ref 7.35–7.45)
PH, TEMP CORRECTED: 7.42 PH UNITS (ref 7.35–7.45)
PHOSPHATE SERPL-MCNC: 2.8 MG/DL (ref 2.5–4.5)
PHOSPHATE SERPL-MCNC: 3 MG/DL (ref 2.5–4.5)
PHOSPHATE SERPL-MCNC: 3.1 MG/DL (ref 2.5–4.5)
PHOSPHATE SERPL-MCNC: 3.2 MG/DL (ref 2.5–4.5)
PHOSPHATE SERPL-MCNC: 4 MG/DL (ref 2.5–4.5)
PHOSPHATE SERPL-MCNC: 5 MG/DL (ref 2.5–4.5)
PLAT MORPH BLD: NORMAL
PLATELET # BLD AUTO: 151 10*3/MM3 (ref 140–450)
PLATELET # BLD AUTO: 156 10*3/MM3 (ref 140–450)
PLATELET # BLD AUTO: 182 10*3/MM3 (ref 140–450)
PLATELET # BLD AUTO: 187 10*3/MM3 (ref 140–450)
PLATELET # BLD AUTO: 193 10*3/MM3 (ref 140–450)
PLATELET # BLD AUTO: 195 10*3/MM3 (ref 140–450)
PLATELET # BLD AUTO: 199 10*3/MM3 (ref 140–450)
PLATELET # BLD AUTO: 203 10*3/MM3 (ref 140–450)
PLATELET # BLD AUTO: 209 10*3/MM3 (ref 140–450)
PLATELET # BLD AUTO: 228 10*3/MM3 (ref 140–450)
PLATELET # BLD AUTO: 256 10*3/MM3 (ref 140–450)
PLATELET # BLD AUTO: 309 10*3/MM3 (ref 140–450)
PLATELET # BLD AUTO: 314 10*3/MM3 (ref 140–450)
PLATELET # BLD: 100 K/UL (ref 130–400)
PMV BLD AUTO: 10.3 FL (ref 9.4–12.3)
PMV BLD AUTO: 10.5 FL (ref 6–12)
PMV BLD AUTO: 10.6 FL (ref 6–12)
PMV BLD AUTO: 10.6 FL (ref 6–12)
PMV BLD AUTO: 10.8 FL (ref 6–12)
PMV BLD AUTO: 10.9 FL (ref 6–12)
PMV BLD AUTO: 11 FL (ref 6–12)
PMV BLD AUTO: 9.4 FL (ref 6–12)
PMV BLD AUTO: 9.7 FL (ref 6–12)
PMV BLD AUTO: 9.8 FL (ref 6–12)
PMV BLD AUTO: 9.9 FL (ref 6–12)
PO2 ARTERIAL: 57 MMHG (ref 80–100)
PO2 BLDA: 110 MM HG (ref 83–108)
PO2 BLDA: 256 MM HG (ref 83–108)
PO2 BLDA: 355 MM HG (ref 83–108)
PO2 BLDA: 70.9 MM HG (ref 83–108)
PO2 TEMP ADJ BLD: 110 MM HG (ref 83–108)
PO2 TEMP ADJ BLD: 256 MM HG (ref 83–108)
PO2 TEMP ADJ BLD: 355 MM HG (ref 83–108)
PO2 TEMP ADJ BLD: 70.9 MM HG (ref 83–108)
POLYCHROMASIA BLD QL SMEAR: ABNORMAL
POTASSIUM SERPL-SCNC: 3 MMOL/L (ref 3.5–5.2)
POTASSIUM SERPL-SCNC: 3.2 MMOL/L (ref 3.5–5.2)
POTASSIUM SERPL-SCNC: 3.6 MMOL/L (ref 3.5–5.2)
POTASSIUM SERPL-SCNC: 3.8 MMOL/L (ref 3.5–5.2)
POTASSIUM SERPL-SCNC: 3.9 MMOL/L (ref 3.5–5.2)
POTASSIUM SERPL-SCNC: 4.3 MMOL/L (ref 3.5–5.2)
POTASSIUM SERPL-SCNC: 4.4 MMOL/L (ref 3.5–5.2)
POTASSIUM SERPL-SCNC: 4.5 MMOL/L (ref 3.5–5.2)
POTASSIUM SERPL-SCNC: 4.5 MMOL/L (ref 3.5–5.2)
POTASSIUM SERPL-SCNC: 4.6 MMOL/L (ref 3.5–5.2)
POTASSIUM SERPL-SCNC: 4.6 MMOL/L (ref 3.5–5.2)
POTASSIUM SERPL-SCNC: 4.8 MMOL/L (ref 3.5–5)
POTASSIUM SERPL-SCNC: 4.9 MMOL/L (ref 3.5–5.2)
POTASSIUM SERPL-SCNC: 5.1 MMOL/L (ref 3.5–5.2)
POTASSIUM SERPL-SCNC: 5.4 MMOL/L (ref 3.5–5.2)
POTASSIUM SERPL-SCNC: 5.5 MMOL/L (ref 3.5–5.2)
POTASSIUM SERPL-SCNC: 5.5 MMOL/L (ref 3.5–5.2)
POTASSIUM SERPL-SCNC: 5.9 MMOL/L (ref 3.5–5.2)
POTASSIUM SERPL-SCNC: 6.6 MMOL/L (ref 3.5–5.2)
POTASSIUM, WHOLE BLOOD: 4.6
PREALB SERPL-MCNC: 10.7 MG/DL (ref 20–40)
PROCALCITONIN SERPL-MCNC: 0.28 NG/ML (ref 0–0.25)
PROT SERPL-MCNC: 5.3 G/DL (ref 6–8.5)
PROT SERPL-MCNC: 5.4 G/DL (ref 6–8.5)
PROT SERPL-MCNC: 5.6 G/DL (ref 6–8.5)
PROT SERPL-MCNC: 6.7 G/DL (ref 6–8.5)
PROT SERPL-MCNC: 6.8 G/DL (ref 6–8.5)
PROT SERPL-MCNC: 7.3 G/DL (ref 6–8.5)
PROT UR QL STRIP: ABNORMAL
PROTHROMBIN TIME: 13 SEC (ref 12–14.6)
PROTHROMBIN TIME: 13 SECONDS (ref 11.9–14.6)
QT INTERVAL: 374 MS
QTC INTERVAL: 467 MS
RBC # BLD AUTO: 2.45 10*6/MM3 (ref 3.77–5.28)
RBC # BLD AUTO: 2.67 10*6/MM3 (ref 3.77–5.28)
RBC # BLD AUTO: 2.7 10*6/MM3 (ref 3.77–5.28)
RBC # BLD AUTO: 2.79 10*6/MM3 (ref 3.77–5.28)
RBC # BLD AUTO: 2.86 10*6/MM3 (ref 3.77–5.28)
RBC # BLD AUTO: 2.87 10*6/MM3 (ref 3.77–5.28)
RBC # BLD AUTO: 2.89 10*6/MM3 (ref 3.77–5.28)
RBC # BLD AUTO: 2.89 10*6/MM3 (ref 3.77–5.28)
RBC # BLD AUTO: 2.92 10*6/MM3 (ref 3.77–5.28)
RBC # BLD AUTO: 2.94 10*6/MM3 (ref 3.77–5.28)
RBC # BLD AUTO: 3 10*6/MM3 (ref 3.77–5.28)
RBC # BLD AUTO: 3.2 10*6/MM3 (ref 3.77–5.28)
RBC # BLD AUTO: 3.68 10*6/MM3 (ref 3.77–5.28)
RBC # BLD: 3.52 M/UL (ref 4.2–5.4)
RBC # UR STRIP: ABNORMAL /HPF
REF LAB TEST METHOD: ABNORMAL
ROULEAUX BLD QL SMEAR: ABNORMAL
SAO2 % BLDCOA: 91.9 % (ref 94–99)
SAO2 % BLDCOA: 98.9 % (ref 94–99)
SAO2 % BLDCOA: 99.9 % (ref 94–99)
SAO2 % BLDCOA: >100.1 % (ref 94–99)
SARS-COV-2 RNA PNL SPEC NAA+PROBE: NOT DETECTED
SARS-COV-2, NAAT: NOT DETECTED
SET MECH RESP RATE: 16
SET MECH RESP RATE: 18
SET MECH RESP RATE: 18
SODIUM BLD-SCNC: 118 MMOL/L (ref 136–145)
SODIUM SERPL-SCNC: 119 MMOL/L (ref 136–145)
SODIUM SERPL-SCNC: 120 MMOL/L (ref 136–145)
SODIUM SERPL-SCNC: 121 MMOL/L (ref 136–145)
SODIUM SERPL-SCNC: 128 MMOL/L (ref 136–145)
SODIUM SERPL-SCNC: 128 MMOL/L (ref 136–145)
SODIUM SERPL-SCNC: 130 MMOL/L (ref 136–145)
SODIUM SERPL-SCNC: 132 MMOL/L (ref 136–145)
SODIUM SERPL-SCNC: 133 MMOL/L (ref 136–145)
SODIUM SERPL-SCNC: 134 MMOL/L (ref 136–145)
SODIUM SERPL-SCNC: 135 MMOL/L (ref 136–145)
SODIUM SERPL-SCNC: 137 MMOL/L (ref 136–145)
SODIUM SERPL-SCNC: 137 MMOL/L (ref 136–145)
SODIUM SERPL-SCNC: 138 MMOL/L (ref 136–145)
SP GR UR STRIP: 1.02 (ref 1–1.03)
SQUAMOUS #/AREA URNS HPF: ABNORMAL /HPF
STRESS TARGET HR: 142 BPM
T4 FREE SERPL-MCNC: 0.88 NG/DL (ref 0.93–1.7)
TOTAL PROTEIN: 7.3 G/DL (ref 6.6–8.7)
TOXIC GRANULATION: ABNORMAL
TRIGL SERPL-MCNC: 35 MG/DL (ref 0–150)
TROPONIN T SERPL-MCNC: 0.09 NG/ML (ref 0–0.03)
TROPONIN T SERPL-MCNC: 0.1 NG/ML (ref 0–0.03)
TROPONIN T SERPL-MCNC: 0.11 NG/ML (ref 0–0.03)
TROPONIN T SERPL-MCNC: 0.11 NG/ML (ref 0–0.03)
TROPONIN T SERPL-MCNC: 0.12 NG/ML (ref 0–0.03)
TROPONIN: 0.1 NG/ML (ref 0–0.03)
TSH SERPL DL<=0.05 MIU/L-ACNC: 41.11 UIU/ML (ref 0.27–4.2)
UROBILINOGEN UR QL STRIP: ABNORMAL
VANCOMYCIN SERPL-MCNC: 13.9 MCG/ML (ref 5–40)
VANCOMYCIN SERPL-MCNC: 21.2 MCG/ML (ref 5–40)
VARIANT LYMPHS NFR BLD MANUAL: 10.2 % (ref 19.6–45.3)
VENTILATOR MODE: ABNORMAL
VENTILATOR MODE: AC
VLDLC SERPL-MCNC: 10 MG/DL (ref 5–40)
VT ON VENT VENT: 400 ML
VT ON VENT VENT: 500 ML
VT ON VENT VENT: 500 ML
WBC # BLD: 4.5 K/UL (ref 4.8–10.8)
WBC # UR STRIP: ABNORMAL /HPF
WBC CLUMPS # UR AUTO: ABNORMAL /HPF
WBC NRBC COR # BLD: 10.25 10*3/MM3 (ref 3.4–10.8)
WBC NRBC COR # BLD: 26.71 10*3/MM3 (ref 3.4–10.8)
WBC NRBC COR # BLD: 3.69 10*3/MM3 (ref 3.4–10.8)
WBC NRBC COR # BLD: 3.88 10*3/MM3 (ref 3.4–10.8)
WBC NRBC COR # BLD: 32.88 10*3/MM3 (ref 3.4–10.8)
WBC NRBC COR # BLD: 4.4 10*3/MM3 (ref 3.4–10.8)
WBC NRBC COR # BLD: 4.61 10*3/MM3 (ref 3.4–10.8)
WBC NRBC COR # BLD: 4.7 10*3/MM3 (ref 3.4–10.8)
WBC NRBC COR # BLD: 5.65 10*3/MM3 (ref 3.4–10.8)
WBC NRBC COR # BLD: 5.71 10*3/MM3 (ref 3.4–10.8)
WBC NRBC COR # BLD: 7.17 10*3/MM3 (ref 3.4–10.8)
WBC NRBC COR # BLD: 7.43 10*3/MM3 (ref 3.4–10.8)
WBC NRBC COR # BLD: 8.12 10*3/MM3 (ref 3.4–10.8)

## 2022-01-01 PROCEDURE — 63710000001 INSULIN LISPRO (HUMAN) PER 5 UNITS: Performed by: INTERNAL MEDICINE

## 2022-01-01 PROCEDURE — 83605 ASSAY OF LACTIC ACID: CPT | Performed by: EMERGENCY MEDICINE

## 2022-01-01 PROCEDURE — 25010000002 VANCOMYCIN 10 G RECONSTITUTED SOLUTION: Performed by: INTERNAL MEDICINE

## 2022-01-01 PROCEDURE — 63710000001 INSULIN DETEMIR PER 5 UNITS: Performed by: INTERNAL MEDICINE

## 2022-01-01 PROCEDURE — 2000000000 HC ICU R&B

## 2022-01-01 PROCEDURE — 86140 C-REACTIVE PROTEIN: CPT | Performed by: EMERGENCY MEDICINE

## 2022-01-01 PROCEDURE — 83735 ASSAY OF MAGNESIUM: CPT | Performed by: INTERNAL MEDICINE

## 2022-01-01 PROCEDURE — 82962 GLUCOSE BLOOD TEST: CPT

## 2022-01-01 PROCEDURE — 94799 UNLISTED PULMONARY SVC/PX: CPT

## 2022-01-01 PROCEDURE — 82803 BLOOD GASES ANY COMBINATION: CPT

## 2022-01-01 PROCEDURE — 2580000003 HC RX 258: Performed by: EMERGENCY MEDICINE

## 2022-01-01 PROCEDURE — 97530 THERAPEUTIC ACTIVITIES: CPT

## 2022-01-01 PROCEDURE — 97110 THERAPEUTIC EXERCISES: CPT

## 2022-01-01 PROCEDURE — 84145 PROCALCITONIN (PCT): CPT | Performed by: EMERGENCY MEDICINE

## 2022-01-01 PROCEDURE — 84443 ASSAY THYROID STIM HORMONE: CPT | Performed by: FAMILY MEDICINE

## 2022-01-01 PROCEDURE — 82947 ASSAY GLUCOSE BLOOD QUANT: CPT | Performed by: INTERNAL MEDICINE

## 2022-01-01 PROCEDURE — 63710000001 INSULIN LISPRO (HUMAN) PER 5 UNITS: Performed by: NURSE PRACTITIONER

## 2022-01-01 PROCEDURE — 85730 THROMBOPLASTIN TIME PARTIAL: CPT

## 2022-01-01 PROCEDURE — 31500 INSERT EMERGENCY AIRWAY: CPT

## 2022-01-01 PROCEDURE — 94002 VENT MGMT INPAT INIT DAY: CPT

## 2022-01-01 PROCEDURE — 93356 MYOCRD STRAIN IMG SPCKL TRCK: CPT | Performed by: INTERNAL MEDICINE

## 2022-01-01 PROCEDURE — 84100 ASSAY OF PHOSPHORUS: CPT | Performed by: INTERNAL MEDICINE

## 2022-01-01 PROCEDURE — 85027 COMPLETE CBC AUTOMATED: CPT | Performed by: INTERNAL MEDICINE

## 2022-01-01 PROCEDURE — 74018 RADEX ABDOMEN 1 VIEW: CPT

## 2022-01-01 PROCEDURE — 84132 ASSAY OF SERUM POTASSIUM: CPT

## 2022-01-01 PROCEDURE — 71045 X-RAY EXAM CHEST 1 VIEW: CPT

## 2022-01-01 PROCEDURE — 25010000002 HEPARIN (PORCINE) PER 1000 UNITS: Performed by: INTERNAL MEDICINE

## 2022-01-01 PROCEDURE — 92610 EVALUATE SWALLOWING FUNCTION: CPT | Performed by: SPEECH-LANGUAGE PATHOLOGIST

## 2022-01-01 PROCEDURE — 25010000002 ENOXAPARIN PER 10 MG: Performed by: INTERNAL MEDICINE

## 2022-01-01 PROCEDURE — 63710000001 INSULIN REGULAR HUMAN PER 5 UNITS: Performed by: INTERNAL MEDICINE

## 2022-01-01 PROCEDURE — 93005 ELECTROCARDIOGRAM TRACING: CPT | Performed by: EMERGENCY MEDICINE

## 2022-01-01 PROCEDURE — 5A1D70Z PERFORMANCE OF URINARY FILTRATION, INTERMITTENT, LESS THAN 6 HOURS PER DAY: ICD-10-PCS | Performed by: INTERNAL MEDICINE

## 2022-01-01 PROCEDURE — 80053 COMPREHEN METABOLIC PANEL: CPT | Performed by: FAMILY MEDICINE

## 2022-01-01 PROCEDURE — 25010000002 HYDRALAZINE PER 20 MG: Performed by: INTERNAL MEDICINE

## 2022-01-01 PROCEDURE — 80048 BASIC METABOLIC PNL TOTAL CA: CPT | Performed by: INTERNAL MEDICINE

## 2022-01-01 PROCEDURE — 6370000000 HC RX 637 (ALT 250 FOR IP): Performed by: EMERGENCY MEDICINE

## 2022-01-01 PROCEDURE — 93356 MYOCRD STRAIN IMG SPCKL TRCK: CPT

## 2022-01-01 PROCEDURE — 63710000001 INSULIN DETEMIR PER 5 UNITS: Performed by: NURSE PRACTITIONER

## 2022-01-01 PROCEDURE — 80053 COMPREHEN METABOLIC PANEL: CPT | Performed by: INTERNAL MEDICINE

## 2022-01-01 PROCEDURE — 84484 ASSAY OF TROPONIN QUANT: CPT | Performed by: INTERNAL MEDICINE

## 2022-01-01 PROCEDURE — 25010000002 EPOETIN ALFA-EPBX 10000 UNIT/ML SOLUTION: Performed by: INTERNAL MEDICINE

## 2022-01-01 PROCEDURE — 25010000002 PROPOFOL 10 MG/ML EMULSION: Performed by: EMERGENCY MEDICINE

## 2022-01-01 PROCEDURE — 99291 CRITICAL CARE FIRST HOUR: CPT

## 2022-01-01 PROCEDURE — 87205 SMEAR GRAM STAIN: CPT | Performed by: INTERNAL MEDICINE

## 2022-01-01 PROCEDURE — 93306 TTE W/DOPPLER COMPLETE: CPT

## 2022-01-01 PROCEDURE — 85610 PROTHROMBIN TIME: CPT

## 2022-01-01 PROCEDURE — 63710000001 ONDANSETRON ODT 4 MG TABLET DISPERSIBLE: Performed by: INTERNAL MEDICINE

## 2022-01-01 PROCEDURE — 85025 COMPLETE CBC W/AUTO DIFF WBC: CPT | Performed by: INTERNAL MEDICINE

## 2022-01-01 PROCEDURE — 25010000002 SODIUM CHLORIDE 0.9 % WITH KCL 20 MEQ 20-0.9 MEQ/L-% SOLUTION: Performed by: INTERNAL MEDICINE

## 2022-01-01 PROCEDURE — 25010000002 CALCIUM GLUCONATE PER 10 ML: Performed by: INTERNAL MEDICINE

## 2022-01-01 PROCEDURE — 25010000002 GLUCAGON (HUMAN RECOMBINANT) 1 MG RECONSTITUTED SOLUTION: Performed by: INTERNAL MEDICINE

## 2022-01-01 PROCEDURE — 87635 SARS-COV-2 COVID-19 AMP PRB: CPT

## 2022-01-01 PROCEDURE — 84484 ASSAY OF TROPONIN QUANT: CPT | Performed by: EMERGENCY MEDICINE

## 2022-01-01 PROCEDURE — 92507 TX SP LANG VOICE COMM INDIV: CPT

## 2022-01-01 PROCEDURE — 80048 BASIC METABOLIC PNL TOTAL CA: CPT | Performed by: NURSE PRACTITIONER

## 2022-01-01 PROCEDURE — 25010000002 CEFTRIAXONE PER 250 MG: Performed by: INTERNAL MEDICINE

## 2022-01-01 PROCEDURE — 0 INSULIN REGULAR HUMAN PER 5 UNITS: Performed by: INTERNAL MEDICINE

## 2022-01-01 PROCEDURE — 70450 CT HEAD/BRAIN W/O DYE: CPT

## 2022-01-01 PROCEDURE — 73552 X-RAY EXAM OF FEMUR 2/>: CPT

## 2022-01-01 PROCEDURE — 87040 BLOOD CULTURE FOR BACTERIA: CPT | Performed by: EMERGENCY MEDICINE

## 2022-01-01 PROCEDURE — 87077 CULTURE AEROBIC IDENTIFY: CPT | Performed by: INTERNAL MEDICINE

## 2022-01-01 PROCEDURE — 87070 CULTURE OTHR SPECIMN AEROBIC: CPT | Performed by: INTERNAL MEDICINE

## 2022-01-01 PROCEDURE — P9612 CATHETERIZE FOR URINE SPEC: HCPCS

## 2022-01-01 PROCEDURE — 93306 TTE W/DOPPLER COMPLETE: CPT | Performed by: INTERNAL MEDICINE

## 2022-01-01 PROCEDURE — 84484 ASSAY OF TROPONIN QUANT: CPT

## 2022-01-01 PROCEDURE — 25010000002 PROPOFOL 10 MG/ML EMULSION: Performed by: INTERNAL MEDICINE

## 2022-01-01 PROCEDURE — 85730 THROMBOPLASTIN TIME PARTIAL: CPT | Performed by: EMERGENCY MEDICINE

## 2022-01-01 PROCEDURE — 72125 CT NECK SPINE W/O DYE: CPT

## 2022-01-01 PROCEDURE — 87040 BLOOD CULTURE FOR BACTERIA: CPT

## 2022-01-01 PROCEDURE — 25010000002 MORPHINE SULFATE (PF) 2 MG/ML SOLUTION: Performed by: INTERNAL MEDICINE

## 2022-01-01 PROCEDURE — 1210000000 HC MED SURG R&B

## 2022-01-01 PROCEDURE — 87635 SARS-COV-2 COVID-19 AMP PRB: CPT | Performed by: EMERGENCY MEDICINE

## 2022-01-01 PROCEDURE — 25010000002 CEFTRIAXONE PER 250 MG: Performed by: EMERGENCY MEDICINE

## 2022-01-01 PROCEDURE — 63710000001 INSULIN DETEMIR PER 5 UNITS: Performed by: FAMILY MEDICINE

## 2022-01-01 PROCEDURE — 25010000002 LORAZEPAM PER 2 MG: Performed by: INTERNAL MEDICINE

## 2022-01-01 PROCEDURE — 81001 URINALYSIS AUTO W/SCOPE: CPT | Performed by: EMERGENCY MEDICINE

## 2022-01-01 PROCEDURE — 80048 BASIC METABOLIC PNL TOTAL CA: CPT | Performed by: FAMILY MEDICINE

## 2022-01-01 PROCEDURE — 97535 SELF CARE MNGMENT TRAINING: CPT

## 2022-01-01 PROCEDURE — 80202 ASSAY OF VANCOMYCIN: CPT | Performed by: INTERNAL MEDICINE

## 2022-01-01 PROCEDURE — 87186 SC STD MICRODIL/AGAR DIL: CPT | Performed by: INTERNAL MEDICINE

## 2022-01-01 PROCEDURE — 80048 BASIC METABOLIC PNL TOTAL CA: CPT

## 2022-01-01 PROCEDURE — 99222 1ST HOSP IP/OBS MODERATE 55: CPT | Performed by: OTOLARYNGOLOGY

## 2022-01-01 PROCEDURE — 63710000001 INSULIN REGULAR HUMAN PER 5 UNITS: Performed by: EMERGENCY MEDICINE

## 2022-01-01 PROCEDURE — 5A1935Z RESPIRATORY VENTILATION, LESS THAN 24 CONSECUTIVE HOURS: ICD-10-PCS | Performed by: EMERGENCY MEDICINE

## 2022-01-01 PROCEDURE — 36415 COLL VENOUS BLD VENIPUNCTURE: CPT

## 2022-01-01 PROCEDURE — 36600 WITHDRAWAL OF ARTERIAL BLOOD: CPT

## 2022-01-01 PROCEDURE — 83930 ASSAY OF BLOOD OSMOLALITY: CPT | Performed by: INTERNAL MEDICINE

## 2022-01-01 PROCEDURE — 99232 SBSQ HOSP IP/OBS MODERATE 35: CPT | Performed by: OTOLARYNGOLOGY

## 2022-01-01 PROCEDURE — 71100 X-RAY EXAM RIBS UNI 2 VIEWS: CPT

## 2022-01-01 PROCEDURE — 87150 DNA/RNA AMPLIFIED PROBE: CPT | Performed by: EMERGENCY MEDICINE

## 2022-01-01 PROCEDURE — 80053 COMPREHEN METABOLIC PANEL: CPT

## 2022-01-01 PROCEDURE — 87147 CULTURE TYPE IMMUNOLOGIC: CPT | Performed by: EMERGENCY MEDICINE

## 2022-01-01 PROCEDURE — 85027 COMPLETE CBC AUTOMATED: CPT | Performed by: FAMILY MEDICINE

## 2022-01-01 PROCEDURE — 85025 COMPLETE CBC W/AUTO DIFF WBC: CPT | Performed by: FAMILY MEDICINE

## 2022-01-01 PROCEDURE — 72170 X-RAY EXAM OF PELVIS: CPT

## 2022-01-01 PROCEDURE — 80170 ASSAY OF GENTAMICIN: CPT | Performed by: INTERNAL MEDICINE

## 2022-01-01 PROCEDURE — 83880 ASSAY OF NATRIURETIC PEPTIDE: CPT | Performed by: EMERGENCY MEDICINE

## 2022-01-01 PROCEDURE — 85025 COMPLETE CBC W/AUTO DIFF WBC: CPT

## 2022-01-01 PROCEDURE — 99223 1ST HOSP IP/OBS HIGH 75: CPT | Performed by: INTERNAL MEDICINE

## 2022-01-01 PROCEDURE — 85007 BL SMEAR W/DIFF WBC COUNT: CPT | Performed by: INTERNAL MEDICINE

## 2022-01-01 PROCEDURE — 84439 ASSAY OF FREE THYROXINE: CPT | Performed by: FAMILY MEDICINE

## 2022-01-01 PROCEDURE — 92523 SPEECH SOUND LANG COMPREHEN: CPT

## 2022-01-01 PROCEDURE — 80061 LIPID PANEL: CPT | Performed by: FAMILY MEDICINE

## 2022-01-01 PROCEDURE — 87086 URINE CULTURE/COLONY COUNT: CPT | Performed by: EMERGENCY MEDICINE

## 2022-01-01 PROCEDURE — 96374 THER/PROPH/DIAG INJ IV PUSH: CPT

## 2022-01-01 PROCEDURE — 25010000002 EPINEPHRINE 1 MG/10ML SOLUTION PREFILLED SYRINGE

## 2022-01-01 PROCEDURE — 94003 VENT MGMT INPAT SUBQ DAY: CPT

## 2022-01-01 PROCEDURE — 87040 BLOOD CULTURE FOR BACTERIA: CPT | Performed by: INTERNAL MEDICINE

## 2022-01-01 PROCEDURE — 99284 EMERGENCY DEPT VISIT MOD MDM: CPT

## 2022-01-01 PROCEDURE — 93010 ELECTROCARDIOGRAM REPORT: CPT | Performed by: INTERNAL MEDICINE

## 2022-01-01 PROCEDURE — 84134 ASSAY OF PREALBUMIN: CPT | Performed by: INTERNAL MEDICINE

## 2022-01-01 PROCEDURE — 83605 ASSAY OF LACTIC ACID: CPT

## 2022-01-01 PROCEDURE — 80053 COMPREHEN METABOLIC PANEL: CPT | Performed by: EMERGENCY MEDICINE

## 2022-01-01 PROCEDURE — 83036 HEMOGLOBIN GLYCOSYLATED A1C: CPT | Performed by: INTERNAL MEDICINE

## 2022-01-01 PROCEDURE — 85025 COMPLETE CBC W/AUTO DIFF WBC: CPT | Performed by: EMERGENCY MEDICINE

## 2022-01-01 PROCEDURE — 0BH18EZ INSERTION OF ENDOTRACHEAL AIRWAY INTO TRACHEA, VIA NATURAL OR ARTIFICIAL OPENING ENDOSCOPIC: ICD-10-PCS | Performed by: EMERGENCY MEDICINE

## 2022-01-01 PROCEDURE — 85610 PROTHROMBIN TIME: CPT | Performed by: EMERGENCY MEDICINE

## 2022-01-01 PROCEDURE — 87150 DNA/RNA AMPLIFIED PROBE: CPT | Performed by: INTERNAL MEDICINE

## 2022-01-01 PROCEDURE — 99221 1ST HOSP IP/OBS SF/LOW 40: CPT | Performed by: NURSE PRACTITIONER

## 2022-01-01 PROCEDURE — 97166 OT EVAL MOD COMPLEX 45 MIN: CPT

## 2022-01-01 PROCEDURE — 97162 PT EVAL MOD COMPLEX 30 MIN: CPT

## 2022-01-01 RX ORDER — LABETALOL HYDROCHLORIDE 5 MG/ML
10 INJECTION, SOLUTION INTRAVENOUS EVERY 4 HOURS PRN
Status: DISCONTINUED | OUTPATIENT
Start: 2022-01-01 | End: 2022-01-01 | Stop reason: HOSPADM

## 2022-01-01 RX ORDER — CARVEDILOL 6.25 MG/1
12.5 TABLET ORAL 2 TIMES DAILY WITH MEALS
Status: DISCONTINUED | OUTPATIENT
Start: 2022-01-01 | End: 2022-01-01

## 2022-01-01 RX ORDER — SODIUM CHLORIDE 0.9 % (FLUSH) 0.9 %
10 SYRINGE (ML) INJECTION AS NEEDED
Status: DISCONTINUED | OUTPATIENT
Start: 2022-01-01 | End: 2022-01-01

## 2022-01-01 RX ORDER — LORAZEPAM 2 MG/ML
1 INJECTION INTRAMUSCULAR EVERY 4 HOURS PRN
Status: DISCONTINUED | OUTPATIENT
Start: 2022-01-01 | End: 2022-01-01

## 2022-01-01 RX ORDER — SODIUM CHLORIDE 0.9 % (FLUSH) 0.9 %
10 SYRINGE (ML) INJECTION AS NEEDED
Status: DISCONTINUED | OUTPATIENT
Start: 2022-01-01 | End: 2022-01-01 | Stop reason: HOSPADM

## 2022-01-01 RX ORDER — MORPHINE SULFATE 2 MG/ML
2 INJECTION, SOLUTION INTRAMUSCULAR; INTRAVENOUS EVERY 4 HOURS PRN
Status: DISCONTINUED | OUTPATIENT
Start: 2022-01-01 | End: 2022-01-01

## 2022-01-01 RX ORDER — LEVOTHYROXINE SODIUM 0.2 MG/1
200 TABLET ORAL DAILY
COMMUNITY

## 2022-01-01 RX ORDER — NOREPINEPHRINE BIT/0.9 % NACL 8 MG/250ML
INFUSION BOTTLE (ML) INTRAVENOUS
Status: DISCONTINUED
Start: 2022-01-01 | End: 2022-01-01 | Stop reason: HOSPADM

## 2022-01-01 RX ORDER — ISOSORBIDE MONONITRATE 30 MG/1
120 TABLET, EXTENDED RELEASE ORAL
Status: DISCONTINUED | OUTPATIENT
Start: 2022-01-01 | End: 2022-01-01 | Stop reason: SDUPTHER

## 2022-01-01 RX ORDER — CEFUROXIME AXETIL 250 MG/1
250 TABLET ORAL DAILY
Status: ON HOLD | COMMUNITY
End: 2022-01-01

## 2022-01-01 RX ORDER — DULOXETIN HYDROCHLORIDE 30 MG/1
60 CAPSULE, DELAYED RELEASE ORAL DAILY
Status: DISCONTINUED | OUTPATIENT
Start: 2022-01-01 | End: 2022-01-01 | Stop reason: HOSPADM

## 2022-01-01 RX ORDER — ISOSORBIDE DINITRATE 10 MG/1
40 TABLET ORAL
Status: DISCONTINUED | OUTPATIENT
Start: 2022-01-01 | End: 2022-01-01

## 2022-01-01 RX ORDER — ALBUMIN (HUMAN) 12.5 G/50ML
12.5 SOLUTION INTRAVENOUS AS NEEDED
Status: CANCELLED | OUTPATIENT
Start: 2022-01-01 | End: 2022-01-01

## 2022-01-01 RX ORDER — LIDOCAINE 50 MG/G
2 PATCH TOPICAL
Start: 2022-01-01

## 2022-01-01 RX ORDER — SODIUM CHLORIDE AND POTASSIUM CHLORIDE 150; 900 MG/100ML; MG/100ML
50 INJECTION, SOLUTION INTRAVENOUS CONTINUOUS PRN
Status: DISCONTINUED | OUTPATIENT
Start: 2022-01-01 | End: 2022-01-01

## 2022-01-01 RX ORDER — MELATONIN
1000 DAILY
Status: DISCONTINUED | OUTPATIENT
Start: 2022-01-01 | End: 2022-01-01 | Stop reason: HOSPADM

## 2022-01-01 RX ORDER — ROCURONIUM BROMIDE 10 MG/ML
100 INJECTION, SOLUTION INTRAVENOUS ONCE
Status: COMPLETED | OUTPATIENT
Start: 2022-01-01 | End: 2022-01-01

## 2022-01-01 RX ORDER — ACETAMINOPHEN 650 MG/1
650 SUPPOSITORY RECTAL EVERY 4 HOURS PRN
Status: DISCONTINUED | OUTPATIENT
Start: 2022-01-01 | End: 2022-01-01 | Stop reason: HOSPADM

## 2022-01-01 RX ORDER — NIFEDIPINE 30 MG/1
90 TABLET, EXTENDED RELEASE ORAL
Start: 2022-01-01

## 2022-01-01 RX ORDER — SACCHAROMYCES BOULARDII 250 MG
250 CAPSULE ORAL 2 TIMES DAILY
Status: DISCONTINUED | OUTPATIENT
Start: 2022-01-01 | End: 2022-01-01 | Stop reason: HOSPADM

## 2022-01-01 RX ORDER — ACETAMINOPHEN 160 MG/5ML
650 SOLUTION ORAL EVERY 4 HOURS PRN
Status: DISCONTINUED | OUTPATIENT
Start: 2022-01-01 | End: 2022-01-01 | Stop reason: HOSPADM

## 2022-01-01 RX ORDER — SODIUM CHLORIDE 9 MG/ML
25 INJECTION, SOLUTION INTRAVENOUS PRN
Status: DISCONTINUED | OUTPATIENT
Start: 2022-01-01 | End: 2022-01-05 | Stop reason: HOSPADM

## 2022-01-01 RX ORDER — ATORVASTATIN CALCIUM 10 MG/1
20 TABLET, FILM COATED ORAL NIGHTLY
Status: DISCONTINUED | OUTPATIENT
Start: 2022-01-01 | End: 2022-01-01 | Stop reason: HOSPADM

## 2022-01-01 RX ORDER — IPRATROPIUM BROMIDE AND ALBUTEROL SULFATE 2.5; .5 MG/3ML; MG/3ML
3 SOLUTION RESPIRATORY (INHALATION)
Status: DISCONTINUED | OUTPATIENT
Start: 2022-01-01 | End: 2022-01-01 | Stop reason: HOSPADM

## 2022-01-01 RX ORDER — SODIUM CHLORIDE 0.9 % (FLUSH) 0.9 %
10 SYRINGE (ML) INJECTION ONCE AS NEEDED
Status: DISCONTINUED | OUTPATIENT
Start: 2022-01-01 | End: 2022-01-01

## 2022-01-01 RX ORDER — ISOSORBIDE MONONITRATE 120 MG/1
120 TABLET, EXTENDED RELEASE ORAL DAILY
COMMUNITY
End: 2022-01-01 | Stop reason: HOSPADM

## 2022-01-01 RX ORDER — ONDANSETRON 2 MG/ML
4 INJECTION INTRAMUSCULAR; INTRAVENOUS EVERY 6 HOURS PRN
Status: DISCONTINUED | OUTPATIENT
Start: 2022-01-01 | End: 2022-01-05 | Stop reason: HOSPADM

## 2022-01-01 RX ORDER — INSULIN LISPRO 100 [IU]/ML
2-7 INJECTION, SOLUTION INTRAVENOUS; SUBCUTANEOUS EVERY 6 HOURS SCHEDULED
Status: DISCONTINUED | OUTPATIENT
Start: 2022-01-01 | End: 2022-01-01

## 2022-01-01 RX ORDER — LISINOPRIL 20 MG/1
20 TABLET ORAL EVERY 12 HOURS SCHEDULED
Status: DISCONTINUED | OUTPATIENT
Start: 2022-01-01 | End: 2022-01-01

## 2022-01-01 RX ORDER — LIDOCAINE 50 MG/G
2 PATCH TOPICAL
Status: DISCONTINUED | OUTPATIENT
Start: 2022-01-01 | End: 2022-01-01 | Stop reason: HOSPADM

## 2022-01-01 RX ORDER — METOPROLOL TARTRATE 50 MG/1
50 TABLET, FILM COATED ORAL EVERY 12 HOURS SCHEDULED
Status: DISCONTINUED | OUTPATIENT
Start: 2022-01-01 | End: 2022-01-01

## 2022-01-01 RX ORDER — ACETAMINOPHEN 325 MG/1
650 TABLET ORAL EVERY 4 HOURS PRN
Status: DISCONTINUED | OUTPATIENT
Start: 2022-01-01 | End: 2022-01-01 | Stop reason: HOSPADM

## 2022-01-01 RX ORDER — LACTULOSE 20 G/30ML
20 SOLUTION ORAL DAILY
Status: DISCONTINUED | OUTPATIENT
Start: 2022-01-01 | End: 2022-01-01 | Stop reason: HOSPADM

## 2022-01-01 RX ORDER — HYDRALAZINE HYDROCHLORIDE 25 MG/1
75 TABLET, FILM COATED ORAL EVERY 8 HOURS SCHEDULED
Start: 2022-01-01

## 2022-01-01 RX ORDER — NIFEDIPINE 30 MG/1
30 TABLET, EXTENDED RELEASE ORAL ONCE
Status: COMPLETED | OUTPATIENT
Start: 2022-01-01 | End: 2022-01-01

## 2022-01-01 RX ORDER — NICOTINE 21 MG/24HR
1 PATCH, TRANSDERMAL 24 HOURS TRANSDERMAL
Start: 2022-01-01

## 2022-01-01 RX ORDER — LEVETIRACETAM 100 MG/ML
300 SOLUTION ORAL EVERY 12 HOURS SCHEDULED
Status: DISCONTINUED | OUTPATIENT
Start: 2022-01-01 | End: 2022-01-01 | Stop reason: HOSPADM

## 2022-01-01 RX ORDER — DEXTROSE AND SODIUM CHLORIDE 5; .45 G/100ML; G/100ML
50 INJECTION, SOLUTION INTRAVENOUS CONTINUOUS PRN
Status: DISCONTINUED | OUTPATIENT
Start: 2022-01-01 | End: 2022-01-01

## 2022-01-01 RX ORDER — DEXTROSE MONOHYDRATE 25 G/50ML
12.5 INJECTION, SOLUTION INTRAVENOUS AS NEEDED
Status: DISCONTINUED | OUTPATIENT
Start: 2022-01-01 | End: 2022-01-01 | Stop reason: HOSPADM

## 2022-01-01 RX ORDER — CARVEDILOL 25 MG/1
25 TABLET ORAL 2 TIMES DAILY WITH MEALS
Status: DISCONTINUED | OUTPATIENT
Start: 2022-01-01 | End: 2022-01-01 | Stop reason: HOSPADM

## 2022-01-01 RX ORDER — ONDANSETRON 2 MG/ML
4 INJECTION INTRAMUSCULAR; INTRAVENOUS EVERY 6 HOURS PRN
Status: DISCONTINUED | OUTPATIENT
Start: 2022-01-01 | End: 2022-01-01 | Stop reason: HOSPADM

## 2022-01-01 RX ORDER — LIDOCAINE 50 MG/G
1 PATCH TOPICAL
Status: DISCONTINUED | OUTPATIENT
Start: 2022-01-01 | End: 2022-01-01

## 2022-01-01 RX ORDER — HYDROCODONE BITARTRATE AND ACETAMINOPHEN 10; 325 MG/1; MG/1
1 TABLET ORAL EVERY 4 HOURS PRN
Status: DISCONTINUED | OUTPATIENT
Start: 2022-01-01 | End: 2022-01-01 | Stop reason: HOSPADM

## 2022-01-01 RX ORDER — NICOTINE POLACRILEX 4 MG
15 LOZENGE BUCCAL
Status: DISCONTINUED | OUTPATIENT
Start: 2022-01-01 | End: 2022-01-01 | Stop reason: HOSPADM

## 2022-01-01 RX ORDER — HEPARIN SODIUM 5000 [USP'U]/ML
5000 INJECTION, SOLUTION INTRAVENOUS; SUBCUTANEOUS EVERY 8 HOURS SCHEDULED
Status: DISCONTINUED | OUTPATIENT
Start: 2022-01-01 | End: 2022-01-02

## 2022-01-01 RX ORDER — ASPIRIN 81 MG/1
81 TABLET, CHEWABLE ORAL DAILY
Status: DISCONTINUED | OUTPATIENT
Start: 2022-01-01 | End: 2022-01-01

## 2022-01-01 RX ORDER — LEVOTHYROXINE SODIUM 0.1 MG/1
200 TABLET ORAL
Status: DISCONTINUED | OUTPATIENT
Start: 2022-01-01 | End: 2022-01-01 | Stop reason: HOSPADM

## 2022-01-01 RX ORDER — DEXTROSE MONOHYDRATE 25 G/50ML
50 INJECTION, SOLUTION INTRAVENOUS ONCE
Status: COMPLETED | OUTPATIENT
Start: 2022-01-01 | End: 2022-01-01

## 2022-01-01 RX ORDER — NIFEDIPINE 60 MG/1
60 TABLET, EXTENDED RELEASE ORAL
Status: DISCONTINUED | OUTPATIENT
Start: 2022-01-01 | End: 2022-01-01 | Stop reason: SDUPTHER

## 2022-01-01 RX ORDER — DEXTROSE MONOHYDRATE 25 G/50ML
25 INJECTION, SOLUTION INTRAVENOUS
Status: DISCONTINUED | OUTPATIENT
Start: 2022-01-01 | End: 2022-01-01 | Stop reason: HOSPADM

## 2022-01-01 RX ORDER — SEVELAMER CARBONATE 800 MG/1
800 TABLET, FILM COATED ORAL
Status: DISCONTINUED | OUTPATIENT
Start: 2022-01-01 | End: 2022-01-01 | Stop reason: HOSPADM

## 2022-01-01 RX ORDER — INSULIN LISPRO 100 [IU]/ML
12 INJECTION, SOLUTION INTRAVENOUS; SUBCUTANEOUS
Status: DISPENSED | OUTPATIENT
Start: 2022-01-01 | End: 2022-01-01

## 2022-01-01 RX ORDER — NIFEDIPINE 30 MG/1
90 TABLET, EXTENDED RELEASE ORAL
Status: DISCONTINUED | OUTPATIENT
Start: 2022-01-01 | End: 2022-01-01 | Stop reason: HOSPADM

## 2022-01-01 RX ORDER — POTASSIUM CHLORIDE 1.5 G/1.77G
40 POWDER, FOR SOLUTION ORAL
Status: COMPLETED | OUTPATIENT
Start: 2022-01-01 | End: 2022-01-01

## 2022-01-01 RX ORDER — NIFEDIPINE 10 MG/1
30 CAPSULE ORAL EVERY 8 HOURS SCHEDULED
Status: DISCONTINUED | OUTPATIENT
Start: 2022-01-01 | End: 2022-01-01

## 2022-01-01 RX ORDER — NIFEDIPINE 90 MG/1
90 TABLET, EXTENDED RELEASE ORAL
Status: DISCONTINUED | OUTPATIENT
Start: 2022-01-01 | End: 2022-01-01

## 2022-01-01 RX ORDER — NYSTATIN 100000 U/G
1 OINTMENT TOPICAL EVERY 12 HOURS SCHEDULED
Start: 2022-01-01

## 2022-01-01 RX ORDER — CLONIDINE HYDROCHLORIDE 0.1 MG/1
0.1 TABLET ORAL EVERY 12 HOURS SCHEDULED
Status: DISCONTINUED | OUTPATIENT
Start: 2022-01-01 | End: 2022-01-01 | Stop reason: HOSPADM

## 2022-01-01 RX ORDER — SENNA PLUS 8.6 MG/1
1 TABLET ORAL DAILY
COMMUNITY

## 2022-01-01 RX ORDER — ASPIRIN 81 MG/1
81 TABLET, CHEWABLE ORAL DAILY
Status: DISCONTINUED | OUTPATIENT
Start: 2022-01-01 | End: 2022-01-01 | Stop reason: HOSPADM

## 2022-01-01 RX ORDER — ACETAMINOPHEN 650 MG/1
650 SUPPOSITORY RECTAL EVERY 6 HOURS PRN
Status: DISCONTINUED | OUTPATIENT
Start: 2022-01-01 | End: 2022-01-05 | Stop reason: HOSPADM

## 2022-01-01 RX ORDER — ACETYLCYSTEINE 200 MG/ML
3 SOLUTION ORAL; RESPIRATORY (INHALATION)
Status: DISCONTINUED | OUTPATIENT
Start: 2022-01-01 | End: 2022-01-01

## 2022-01-01 RX ORDER — SODIUM CHLORIDE 0.9 % (FLUSH) 0.9 %
5-40 SYRINGE (ML) INJECTION PRN
Status: DISCONTINUED | OUTPATIENT
Start: 2022-01-01 | End: 2022-01-05 | Stop reason: HOSPADM

## 2022-01-01 RX ORDER — METOPROLOL SUCCINATE 25 MG/1
100 TABLET, EXTENDED RELEASE ORAL
Status: DISCONTINUED | OUTPATIENT
Start: 2022-01-01 | End: 2022-01-01 | Stop reason: SDUPTHER

## 2022-01-01 RX ORDER — ROPINIROLE 1 MG/1
4 TABLET, FILM COATED ORAL NIGHTLY
Status: DISCONTINUED | OUTPATIENT
Start: 2022-01-01 | End: 2022-01-01 | Stop reason: HOSPADM

## 2022-01-01 RX ORDER — ONDANSETRON 4 MG/1
4 TABLET, ORALLY DISINTEGRATING ORAL EVERY 6 HOURS PRN
Status: DISCONTINUED | OUTPATIENT
Start: 2022-01-01 | End: 2022-01-01 | Stop reason: HOSPADM

## 2022-01-01 RX ORDER — SODIUM CHLORIDE 0.9 % (FLUSH) 0.9 %
10 SYRINGE (ML) INJECTION EVERY 12 HOURS SCHEDULED
Status: DISCONTINUED | OUTPATIENT
Start: 2022-01-01 | End: 2022-01-01

## 2022-01-01 RX ORDER — SODIUM CHLORIDE 450 MG/100ML
50 INJECTION, SOLUTION INTRAVENOUS CONTINUOUS PRN
Status: DISCONTINUED | OUTPATIENT
Start: 2022-01-01 | End: 2022-01-01 | Stop reason: HOSPADM

## 2022-01-01 RX ORDER — SENNA PLUS 8.6 MG/1
1 TABLET ORAL DAILY
Status: DISCONTINUED | OUTPATIENT
Start: 2022-01-01 | End: 2022-01-01 | Stop reason: SDUPTHER

## 2022-01-01 RX ORDER — DULOXETIN HYDROCHLORIDE 30 MG/1
60 CAPSULE, DELAYED RELEASE ORAL DAILY
Status: DISCONTINUED | OUTPATIENT
Start: 2022-01-01 | End: 2022-01-01 | Stop reason: SDUPTHER

## 2022-01-01 RX ORDER — CLONIDINE 0.1 MG/24H
1 PATCH, EXTENDED RELEASE TRANSDERMAL WEEKLY
Status: DISCONTINUED | OUTPATIENT
Start: 2022-01-01 | End: 2022-01-01 | Stop reason: HOSPADM

## 2022-01-01 RX ORDER — ACETYLCYSTEINE 200 MG/ML
1.5 SOLUTION ORAL; RESPIRATORY (INHALATION) 2 TIMES DAILY PRN
Status: DISCONTINUED | OUTPATIENT
Start: 2022-01-01 | End: 2022-01-01 | Stop reason: HOSPADM

## 2022-01-01 RX ORDER — SODIUM CHLORIDE 9 MG/ML
50 INJECTION, SOLUTION INTRAVENOUS CONTINUOUS PRN
Status: DISCONTINUED | OUTPATIENT
Start: 2022-01-01 | End: 2022-01-01 | Stop reason: HOSPADM

## 2022-01-01 RX ORDER — ERGOCALCIFEROL 1.25 MG/1
50000 CAPSULE ORAL
COMMUNITY

## 2022-01-01 RX ORDER — NICOTINE 21 MG/24HR
1 PATCH, TRANSDERMAL 24 HOURS TRANSDERMAL
Status: DISCONTINUED | OUTPATIENT
Start: 2022-01-01 | End: 2022-01-01 | Stop reason: HOSPADM

## 2022-01-01 RX ORDER — ASPIRIN 300 MG/1
300 SUPPOSITORY RECTAL ONCE
Status: COMPLETED | OUTPATIENT
Start: 2022-01-01 | End: 2022-01-01

## 2022-01-01 RX ORDER — FAMOTIDINE 10 MG/ML
20 INJECTION, SOLUTION INTRAVENOUS DAILY
Status: DISCONTINUED | OUTPATIENT
Start: 2022-01-01 | End: 2022-01-01

## 2022-01-01 RX ORDER — SODIUM CHLORIDE 9 MG/ML
10 INJECTION, SOLUTION INTRAVENOUS CONTINUOUS PRN
Status: DISCONTINUED | OUTPATIENT
Start: 2022-01-01 | End: 2022-01-01 | Stop reason: HOSPADM

## 2022-01-01 RX ORDER — LACTULOSE 20 G/30ML
15 SOLUTION ORAL 3 TIMES DAILY
Status: DISCONTINUED | OUTPATIENT
Start: 2022-01-01 | End: 2022-01-01 | Stop reason: HOSPADM

## 2022-01-01 RX ORDER — SODIUM CHLORIDE 0.9 % (FLUSH) 0.9 %
10 SYRINGE (ML) INJECTION EVERY 12 HOURS SCHEDULED
Status: DISCONTINUED | OUTPATIENT
Start: 2022-01-01 | End: 2022-01-01 | Stop reason: HOSPADM

## 2022-01-01 RX ORDER — HYDROCODONE BITARTRATE AND ACETAMINOPHEN 10; 325 MG/1; MG/1
1 TABLET ORAL EVERY 6 HOURS PRN
Status: DISCONTINUED | OUTPATIENT
Start: 2022-01-01 | End: 2022-01-01

## 2022-01-01 RX ORDER — DEXTROSE MONOHYDRATE 100 MG/ML
50 INJECTION, SOLUTION INTRAVENOUS CONTINUOUS
Status: DISCONTINUED | OUTPATIENT
Start: 2022-01-01 | End: 2022-01-01

## 2022-01-01 RX ORDER — HYDRALAZINE HYDROCHLORIDE 50 MG/1
50 TABLET, FILM COATED ORAL EVERY 8 HOURS SCHEDULED
Status: DISCONTINUED | OUTPATIENT
Start: 2022-01-01 | End: 2022-01-01

## 2022-01-01 RX ORDER — IPRATROPIUM BROMIDE AND ALBUTEROL SULFATE 2.5; .5 MG/3ML; MG/3ML
3 SOLUTION RESPIRATORY (INHALATION)
Qty: 360 ML
Start: 2022-01-01

## 2022-01-01 RX ORDER — ONDANSETRON 4 MG/1
4 TABLET, ORALLY DISINTEGRATING ORAL EVERY 6 HOURS PRN
Start: 2022-01-01

## 2022-01-01 RX ORDER — HYDRALAZINE HYDROCHLORIDE 20 MG/ML
10 INJECTION INTRAMUSCULAR; INTRAVENOUS EVERY 8 HOURS PRN
Status: DISCONTINUED | OUTPATIENT
Start: 2022-01-01 | End: 2022-01-01 | Stop reason: HOSPADM

## 2022-01-01 RX ORDER — POLYETHYLENE GLYCOL 3350 17 G/17G
17 POWDER, FOR SOLUTION ORAL DAILY PRN
Status: DISCONTINUED | OUTPATIENT
Start: 2022-01-01 | End: 2022-01-05 | Stop reason: HOSPADM

## 2022-01-01 RX ORDER — SODIUM CHLORIDE AND POTASSIUM CHLORIDE 150; 450 MG/100ML; MG/100ML
50 INJECTION, SOLUTION INTRAVENOUS CONTINUOUS PRN
Status: DISCONTINUED | OUTPATIENT
Start: 2022-01-01 | End: 2022-01-01

## 2022-01-01 RX ORDER — HYDRALAZINE HYDROCHLORIDE 50 MG/1
50 TABLET, FILM COATED ORAL 3 TIMES DAILY
COMMUNITY
End: 2022-01-01 | Stop reason: HOSPADM

## 2022-01-01 RX ORDER — NYSTATIN 100000 U/G
1 OINTMENT TOPICAL EVERY 12 HOURS SCHEDULED
Status: DISCONTINUED | OUTPATIENT
Start: 2022-01-01 | End: 2022-01-01 | Stop reason: HOSPADM

## 2022-01-01 RX ORDER — FAMOTIDINE 20 MG/1
20 TABLET, FILM COATED ORAL DAILY
Status: DISCONTINUED | OUTPATIENT
Start: 2022-01-01 | End: 2022-01-01 | Stop reason: HOSPADM

## 2022-01-01 RX ORDER — VANCOMYCIN
125 KIT EVERY 6 HOURS SCHEDULED
Status: DISCONTINUED | OUTPATIENT
Start: 2022-01-01 | End: 2022-01-01 | Stop reason: HOSPADM

## 2022-01-01 RX ORDER — ISOSORBIDE MONONITRATE 60 MG/1
60 TABLET, EXTENDED RELEASE ORAL
Status: DISCONTINUED | OUTPATIENT
Start: 2022-01-01 | End: 2022-01-01 | Stop reason: HOSPADM

## 2022-01-01 RX ORDER — 0.9 % SODIUM CHLORIDE 0.9 %
1000 INTRAVENOUS SOLUTION INTRAVENOUS ONCE
Status: COMPLETED | OUTPATIENT
Start: 2022-01-01 | End: 2022-01-02

## 2022-01-01 RX ORDER — ETOMIDATE 2 MG/ML
30 INJECTION INTRAVENOUS ONCE
Status: COMPLETED | OUTPATIENT
Start: 2022-01-01 | End: 2022-01-01

## 2022-01-01 RX ORDER — SODIUM CHLORIDE 9 MG/ML
INJECTION, SOLUTION INTRAVENOUS CONTINUOUS
Status: DISCONTINUED | OUTPATIENT
Start: 2022-01-01 | End: 2022-01-02

## 2022-01-01 RX ORDER — HEPARIN SODIUM 5000 [USP'U]/ML
5000 INJECTION, SOLUTION INTRAVENOUS; SUBCUTANEOUS EVERY 8 HOURS SCHEDULED
Status: DISCONTINUED | OUTPATIENT
Start: 2022-01-01 | End: 2022-01-01 | Stop reason: HOSPADM

## 2022-01-01 RX ORDER — ROPINIROLE 2 MG/1
2 TABLET, FILM COATED ORAL NIGHTLY
Status: ON HOLD | COMMUNITY
End: 2022-01-01

## 2022-01-01 RX ORDER — LACTULOSE 10 G/15ML
10 SOLUTION ORAL
COMMUNITY

## 2022-01-01 RX ORDER — NIFEDIPINE 30 MG/1
30 TABLET, EXTENDED RELEASE ORAL
Status: DISCONTINUED | OUTPATIENT
Start: 2022-01-01 | End: 2022-01-01

## 2022-01-01 RX ORDER — CLONIDINE 0.1 MG/24H
1 PATCH, EXTENDED RELEASE TRANSDERMAL WEEKLY
COMMUNITY

## 2022-01-01 RX ORDER — ALBUTEROL SULFATE 2.5 MG/3ML
2.5 SOLUTION RESPIRATORY (INHALATION)
Status: DISCONTINUED | OUTPATIENT
Start: 2022-01-01 | End: 2022-01-01 | Stop reason: HOSPADM

## 2022-01-01 RX ORDER — INSULIN LISPRO 100 [IU]/ML
0-9 INJECTION, SOLUTION INTRAVENOUS; SUBCUTANEOUS EVERY 6 HOURS SCHEDULED
Status: DISCONTINUED | OUTPATIENT
Start: 2022-01-01 | End: 2022-01-01 | Stop reason: HOSPADM

## 2022-01-01 RX ORDER — ISOSORBIDE MONONITRATE 60 MG/1
60 TABLET, EXTENDED RELEASE ORAL
Start: 2022-01-01

## 2022-01-01 RX ORDER — VENLAFAXINE 37.5 MG/1
75 TABLET ORAL 2 TIMES DAILY WITH MEALS
Status: DISCONTINUED | OUTPATIENT
Start: 2022-01-01 | End: 2022-01-01 | Stop reason: HOSPADM

## 2022-01-01 RX ORDER — DEXTROSE AND SODIUM CHLORIDE 5; .9 G/100ML; G/100ML
50 INJECTION, SOLUTION INTRAVENOUS CONTINUOUS PRN
Status: DISCONTINUED | OUTPATIENT
Start: 2022-01-01 | End: 2022-01-01

## 2022-01-01 RX ORDER — ONDANSETRON 4 MG/1
4 TABLET, FILM COATED ORAL EVERY 8 HOURS PRN
COMMUNITY
End: 2022-01-01 | Stop reason: HOSPADM

## 2022-01-01 RX ORDER — SENNA PLUS 8.6 MG/1
1 TABLET ORAL DAILY
Status: DISCONTINUED | OUTPATIENT
Start: 2022-01-01 | End: 2022-01-01 | Stop reason: HOSPADM

## 2022-01-01 RX ORDER — DEXTROSE MONOHYDRATE 50 MG/ML
50 INJECTION, SOLUTION INTRAVENOUS CONTINUOUS
Status: DISCONTINUED | OUTPATIENT
Start: 2022-01-01 | End: 2022-01-01

## 2022-01-01 RX ORDER — SODIUM CHLORIDE 0.9 % (FLUSH) 0.9 %
5-40 SYRINGE (ML) INJECTION EVERY 12 HOURS SCHEDULED
Status: DISCONTINUED | OUTPATIENT
Start: 2022-01-01 | End: 2022-01-05 | Stop reason: HOSPADM

## 2022-01-01 RX ORDER — TOBRAMYCIN 3 MG/ML
2 SOLUTION/ DROPS OPHTHALMIC
Status: DISCONTINUED | OUTPATIENT
Start: 2022-01-01 | End: 2022-01-01 | Stop reason: HOSPADM

## 2022-01-01 RX ORDER — SODIUM CHLORIDE 0.9 % (FLUSH) 0.9 %
10 SYRINGE (ML) INJECTION EVERY 12 HOURS
Status: DISCONTINUED | OUTPATIENT
Start: 2022-01-01 | End: 2022-01-01 | Stop reason: HOSPADM

## 2022-01-01 RX ORDER — DIAPER,BRIEF,INFANT-TODD,DISP
1 EACH MISCELLANEOUS EVERY 12 HOURS SCHEDULED
Status: DISCONTINUED | OUTPATIENT
Start: 2022-01-01 | End: 2022-01-01 | Stop reason: HOSPADM

## 2022-01-01 RX ORDER — SACCHAROMYCES BOULARDII 250 MG
250 CAPSULE ORAL 2 TIMES DAILY
Status: DISCONTINUED | OUTPATIENT
Start: 2022-01-01 | End: 2022-01-01

## 2022-01-01 RX ORDER — DEXTROSE, SODIUM CHLORIDE, AND POTASSIUM CHLORIDE 5; .45; .15 G/100ML; G/100ML; G/100ML
50 INJECTION INTRAVENOUS CONTINUOUS PRN
Status: DISCONTINUED | OUTPATIENT
Start: 2022-01-01 | End: 2022-01-01

## 2022-01-01 RX ORDER — NOREPINEPHRINE BIT/0.9 % NACL 8 MG/250ML
.02-.3 INFUSION BOTTLE (ML) INTRAVENOUS
Status: DISCONTINUED | OUTPATIENT
Start: 2022-01-01 | End: 2022-01-01 | Stop reason: HOSPADM

## 2022-01-01 RX ORDER — ALBUTEROL SULFATE 2.5 MG/3ML
2.5 SOLUTION RESPIRATORY (INHALATION) EVERY 4 HOURS PRN
Status: DISCONTINUED | OUTPATIENT
Start: 2022-01-01 | End: 2022-01-01

## 2022-01-01 RX ORDER — LEVOTHYROXINE SODIUM 0.05 MG/1
50 TABLET ORAL
Status: DISCONTINUED | OUTPATIENT
Start: 2022-01-01 | End: 2022-01-01

## 2022-01-01 RX ORDER — ACETAMINOPHEN 325 MG/1
650 TABLET ORAL EVERY 6 HOURS PRN
Status: DISCONTINUED | OUTPATIENT
Start: 2022-01-01 | End: 2022-01-05 | Stop reason: HOSPADM

## 2022-01-01 RX ORDER — LACTULOSE 20 G/30ML
15 SOLUTION ORAL 3 TIMES DAILY
Status: DISCONTINUED | OUTPATIENT
Start: 2022-01-01 | End: 2022-01-01

## 2022-01-01 RX ORDER — METOPROLOL TARTRATE 50 MG/1
100 TABLET, FILM COATED ORAL EVERY 12 HOURS SCHEDULED
Status: DISCONTINUED | OUTPATIENT
Start: 2022-01-01 | End: 2022-01-01

## 2022-01-01 RX ORDER — LISINOPRIL 20 MG/1
20 TABLET ORAL EVERY 12 HOURS SCHEDULED
Status: DISCONTINUED | OUTPATIENT
Start: 2022-01-01 | End: 2022-01-01 | Stop reason: HOSPADM

## 2022-01-01 RX ORDER — DEXTROSE, SODIUM CHLORIDE, AND POTASSIUM CHLORIDE 5; .9; .15 G/100ML; G/100ML; G/100ML
50 INJECTION INTRAVENOUS CONTINUOUS PRN
Status: DISCONTINUED | OUTPATIENT
Start: 2022-01-01 | End: 2022-01-01

## 2022-01-01 RX ORDER — HYDRALAZINE HYDROCHLORIDE 25 MG/1
100 TABLET, FILM COATED ORAL EVERY 8 HOURS SCHEDULED
Status: DISCONTINUED | OUTPATIENT
Start: 2022-01-01 | End: 2022-01-01 | Stop reason: HOSPADM

## 2022-01-01 RX ORDER — MINOXIDIL 2.5 MG/1
2.5 TABLET ORAL DAILY
COMMUNITY
End: 2022-01-01 | Stop reason: HOSPADM

## 2022-01-01 RX ORDER — CARVEDILOL 25 MG/1
25 TABLET ORAL 2 TIMES DAILY WITH MEALS
Start: 2022-01-01

## 2022-01-01 RX ORDER — CHLORHEXIDINE GLUCONATE 0.12 MG/ML
15 RINSE ORAL EVERY 12 HOURS SCHEDULED
Status: DISCONTINUED | OUTPATIENT
Start: 2022-01-01 | End: 2022-01-01

## 2022-01-01 RX ORDER — ONDANSETRON 4 MG/1
4 TABLET, ORALLY DISINTEGRATING ORAL EVERY 8 HOURS PRN
Status: DISCONTINUED | OUTPATIENT
Start: 2022-01-01 | End: 2022-01-05 | Stop reason: HOSPADM

## 2022-01-01 RX ORDER — BUSPIRONE HYDROCHLORIDE 5 MG/1
5 TABLET ORAL EVERY 8 HOURS SCHEDULED
Status: DISCONTINUED | OUTPATIENT
Start: 2022-01-01 | End: 2022-01-01 | Stop reason: HOSPADM

## 2022-01-01 RX ORDER — FAMOTIDINE 10 MG/ML
20 INJECTION, SOLUTION INTRAVENOUS DAILY
Status: DISCONTINUED | OUTPATIENT
Start: 2022-01-01 | End: 2022-01-01 | Stop reason: HOSPADM

## 2022-01-01 RX ADMIN — MORPHINE SULFATE 2 MG: 2 INJECTION, SOLUTION INTRAMUSCULAR; INTRAVENOUS at 14:41

## 2022-01-01 RX ADMIN — FAMOTIDINE 20 MG: 10 INJECTION INTRAVENOUS at 16:09

## 2022-01-01 RX ADMIN — IPRATROPIUM BROMIDE AND ALBUTEROL SULFATE 3 ML: 2.5; .5 SOLUTION RESPIRATORY (INHALATION) at 18:57

## 2022-01-01 RX ADMIN — INSULIN LISPRO 3 UNITS: 100 INJECTION, SOLUTION INTRAVENOUS; SUBCUTANEOUS at 11:39

## 2022-01-01 RX ADMIN — NIFEDIPINE 90 MG: 30 TABLET, FILM COATED, EXTENDED RELEASE ORAL at 08:23

## 2022-01-01 RX ADMIN — SODIUM CHLORIDE 10 MG/HR: 9 INJECTION, SOLUTION INTRAVENOUS at 20:16

## 2022-01-01 RX ADMIN — IPRATROPIUM BROMIDE AND ALBUTEROL SULFATE 3 ML: 2.5; .5 SOLUTION RESPIRATORY (INHALATION) at 14:36

## 2022-01-01 RX ADMIN — LEVOTHYROXINE SODIUM 200 MCG: 100 TABLET ORAL at 05:59

## 2022-01-01 RX ADMIN — MORPHINE SULFATE 2 MG: 2 INJECTION, SOLUTION INTRAMUSCULAR; INTRAVENOUS at 20:58

## 2022-01-01 RX ADMIN — Medication 10 ML: at 21:54

## 2022-01-01 RX ADMIN — IPRATROPIUM BROMIDE AND ALBUTEROL SULFATE 3 ML: 2.5; .5 SOLUTION RESPIRATORY (INHALATION) at 18:25

## 2022-01-01 RX ADMIN — NIFEDIPINE 60 MG: 60 TABLET, EXTENDED RELEASE ORAL at 13:14

## 2022-01-01 RX ADMIN — Medication 10 ML: at 20:28

## 2022-01-01 RX ADMIN — DEXTROSE MONOHYDRATE 50 ML/HR: 100 INJECTION, SOLUTION INTRAVENOUS at 04:46

## 2022-01-01 RX ADMIN — DEXTROSE MONOHYDRATE 50 ML/HR: 100 INJECTION, SOLUTION INTRAVENOUS at 09:44

## 2022-01-01 RX ADMIN — DEXTROSE MONOHYDRATE 50 ML/HR: 50 INJECTION, SOLUTION INTRAVENOUS at 11:07

## 2022-01-01 RX ADMIN — LACTULOSE 20 G: 20 SOLUTION ORAL at 13:38

## 2022-01-01 RX ADMIN — LEVOTHYROXINE SODIUM 50 MCG: 0.05 TABLET ORAL at 08:11

## 2022-01-01 RX ADMIN — HYDRALAZINE HYDROCHLORIDE 75 MG: 50 TABLET ORAL at 20:44

## 2022-01-01 RX ADMIN — CARVEDILOL 25 MG: 25 TABLET, FILM COATED ORAL at 16:47

## 2022-01-01 RX ADMIN — CLONIDINE HYDROCHLORIDE 0.1 MG: 0.1 TABLET ORAL at 08:21

## 2022-01-01 RX ADMIN — CHLORHEXIDINE GLUCONATE 15 ML: 1.2 SOLUTION ORAL at 20:28

## 2022-01-01 RX ADMIN — SENNOSIDES 1 TABLET: 8.6 TABLET, FILM COATED ORAL at 08:25

## 2022-01-01 RX ADMIN — INSULIN LISPRO 2 UNITS: 100 INJECTION, SOLUTION INTRAVENOUS; SUBCUTANEOUS at 00:07

## 2022-01-01 RX ADMIN — SODIUM CHLORIDE 15 UNITS/HR: 9 INJECTION, SOLUTION INTRAVENOUS at 00:07

## 2022-01-01 RX ADMIN — VANCOMYCIN HYDROCHLORIDE 1250 MG: 10 INJECTION, POWDER, LYOPHILIZED, FOR SOLUTION INTRAVENOUS at 08:11

## 2022-01-01 RX ADMIN — INSULIN LISPRO 4 UNITS: 100 INJECTION, SOLUTION INTRAVENOUS; SUBCUTANEOUS at 17:06

## 2022-01-01 RX ADMIN — LEVOTHYROXINE SODIUM 50 MCG: 0.05 TABLET ORAL at 05:28

## 2022-01-01 RX ADMIN — IPRATROPIUM BROMIDE AND ALBUTEROL SULFATE 3 ML: 2.5; .5 SOLUTION RESPIRATORY (INHALATION) at 14:09

## 2022-01-01 RX ADMIN — LEVOTHYROXINE SODIUM 50 MCG: 0.05 TABLET ORAL at 06:41

## 2022-01-01 RX ADMIN — DULOXETINE HYDROCHLORIDE 60 MG: 30 CAPSULE, DELAYED RELEASE ORAL at 08:22

## 2022-01-01 RX ADMIN — HYDRALAZINE HYDROCHLORIDE 75 MG: 50 TABLET ORAL at 14:40

## 2022-01-01 RX ADMIN — POTASSIUM CHLORIDE 40 MEQ: 1.5 POWDER, FOR SOLUTION ORAL at 23:24

## 2022-01-01 RX ADMIN — HYDRALAZINE HYDROCHLORIDE 75 MG: 50 TABLET ORAL at 05:34

## 2022-01-01 RX ADMIN — INSULIN LISPRO 3 UNITS: 100 INJECTION, SOLUTION INTRAVENOUS; SUBCUTANEOUS at 20:39

## 2022-01-01 RX ADMIN — LACTULOSE 20 G: 20 SOLUTION ORAL at 14:49

## 2022-01-01 RX ADMIN — HYDRALAZINE HYDROCHLORIDE 75 MG: 50 TABLET ORAL at 04:39

## 2022-01-01 RX ADMIN — IPRATROPIUM BROMIDE AND ALBUTEROL SULFATE 3 ML: 2.5; .5 SOLUTION RESPIRATORY (INHALATION) at 14:20

## 2022-01-01 RX ADMIN — NICOTINE 1 PATCH: 14 PATCH, EXTENDED RELEASE TRANSDERMAL at 08:22

## 2022-01-01 RX ADMIN — SODIUM CHLORIDE 1 G: 900 INJECTION INTRAVENOUS at 11:07

## 2022-01-01 RX ADMIN — IPRATROPIUM BROMIDE AND ALBUTEROL SULFATE 3 ML: 2.5; .5 SOLUTION RESPIRATORY (INHALATION) at 06:16

## 2022-01-01 RX ADMIN — INSULIN LISPRO 3 UNITS: 100 INJECTION, SOLUTION INTRAVENOUS; SUBCUTANEOUS at 21:07

## 2022-01-01 RX ADMIN — ACETAMINOPHEN 650 MG: 325 TABLET ORAL at 10:13

## 2022-01-01 RX ADMIN — NIFEDIPINE 90 MG: 30 TABLET, FILM COATED, EXTENDED RELEASE ORAL at 08:22

## 2022-01-01 RX ADMIN — DEXTROSE MONOHYDRATE 12.5 G: 500 INJECTION PARENTERAL at 22:12

## 2022-01-01 RX ADMIN — ISOSORBIDE MONONITRATE 60 MG: 60 TABLET, EXTENDED RELEASE ORAL at 09:24

## 2022-01-01 RX ADMIN — INSULIN LISPRO 2 UNITS: 100 INJECTION, SOLUTION INTRAVENOUS; SUBCUTANEOUS at 16:10

## 2022-01-01 RX ADMIN — LEVOTHYROXINE SODIUM 200 MCG: 100 TABLET ORAL at 04:39

## 2022-01-01 RX ADMIN — IPRATROPIUM BROMIDE AND ALBUTEROL SULFATE 3 ML: 2.5; .5 SOLUTION RESPIRATORY (INHALATION) at 19:37

## 2022-01-01 RX ADMIN — DEXTROSE 15 G: 15 GEL ORAL at 05:44

## 2022-01-01 RX ADMIN — IPRATROPIUM BROMIDE AND ALBUTEROL SULFATE 3 ML: 2.5; .5 SOLUTION RESPIRATORY (INHALATION) at 14:25

## 2022-01-01 RX ADMIN — NYSTATIN OINTMENT 1 APPLICATION: 100000 OINTMENT TOPICAL at 20:30

## 2022-01-01 RX ADMIN — DULOXETINE HYDROCHLORIDE 60 MG: 30 CAPSULE, DELAYED RELEASE ORAL at 08:05

## 2022-01-01 RX ADMIN — ISOSORBIDE MONONITRATE 60 MG: 60 TABLET, EXTENDED RELEASE ORAL at 08:30

## 2022-01-01 RX ADMIN — Medication 10 ML: at 08:05

## 2022-01-01 RX ADMIN — PROPOFOL 50 MCG/KG/MIN: 10 INJECTION, EMULSION INTRAVENOUS at 13:02

## 2022-01-01 RX ADMIN — IPRATROPIUM BROMIDE AND ALBUTEROL SULFATE 3 ML: 2.5; .5 SOLUTION RESPIRATORY (INHALATION) at 14:56

## 2022-01-01 RX ADMIN — CLONIDINE HYDROCHLORIDE 0.1 MG: 0.1 TABLET ORAL at 20:28

## 2022-01-01 RX ADMIN — HYDRALAZINE HYDROCHLORIDE 75 MG: 50 TABLET ORAL at 06:25

## 2022-01-01 RX ADMIN — DEXTROSE 15 G: 15 GEL ORAL at 06:31

## 2022-01-01 RX ADMIN — SODIUM CHLORIDE 8 UNITS/HR: 9 INJECTION, SOLUTION INTRAVENOUS at 17:38

## 2022-01-01 RX ADMIN — HYDRALAZINE HYDROCHLORIDE 75 MG: 50 TABLET ORAL at 04:38

## 2022-01-01 RX ADMIN — INSULIN DETEMIR 5 UNITS: 100 INJECTION, SOLUTION SUBCUTANEOUS at 20:39

## 2022-01-01 RX ADMIN — IPRATROPIUM BROMIDE AND ALBUTEROL SULFATE 3 ML: 2.5; .5 SOLUTION RESPIRATORY (INHALATION) at 10:21

## 2022-01-01 RX ADMIN — Medication 10 ML: at 20:58

## 2022-01-01 RX ADMIN — CARVEDILOL 12.5 MG: 6.25 TABLET, FILM COATED ORAL at 16:15

## 2022-01-01 RX ADMIN — IPRATROPIUM BROMIDE AND ALBUTEROL SULFATE 3 ML: 2.5; .5 SOLUTION RESPIRATORY (INHALATION) at 06:39

## 2022-01-01 RX ADMIN — HYDROCODONE BITARTRATE AND ACETAMINOPHEN 1 TABLET: 10; 325 TABLET ORAL at 14:16

## 2022-01-01 RX ADMIN — HYDRALAZINE HYDROCHLORIDE 75 MG: 50 TABLET ORAL at 05:28

## 2022-01-01 RX ADMIN — NICOTINE 1 PATCH: 14 PATCH, EXTENDED RELEASE TRANSDERMAL at 08:06

## 2022-01-01 RX ADMIN — CLONIDINE HYDROCHLORIDE 0.1 MG: 0.1 TABLET ORAL at 08:22

## 2022-01-01 RX ADMIN — CARVEDILOL 25 MG: 25 TABLET, FILM COATED ORAL at 07:50

## 2022-01-01 RX ADMIN — HYDRALAZINE HYDROCHLORIDE 50 MG: 50 TABLET ORAL at 08:11

## 2022-01-01 RX ADMIN — CARVEDILOL 25 MG: 25 TABLET, FILM COATED ORAL at 08:21

## 2022-01-01 RX ADMIN — ATORVASTATIN CALCIUM 20 MG: 10 TABLET, FILM COATED ORAL at 20:27

## 2022-01-01 RX ADMIN — CLONIDINE HYDROCHLORIDE 0.3 MG: 0.1 TABLET ORAL at 21:53

## 2022-01-01 RX ADMIN — CARVEDILOL 25 MG: 25 TABLET, FILM COATED ORAL at 19:06

## 2022-01-01 RX ADMIN — ONDANSETRON 4 MG: 4 TABLET, ORALLY DISINTEGRATING ORAL at 11:35

## 2022-01-01 RX ADMIN — NIFEDIPINE 60 MG: 60 TABLET, EXTENDED RELEASE ORAL at 10:14

## 2022-01-01 RX ADMIN — POTASSIUM CHLORIDE AND SODIUM CHLORIDE 50 ML/HR: 900; 150 INJECTION, SOLUTION INTRAVENOUS at 20:00

## 2022-01-01 RX ADMIN — CARVEDILOL 25 MG: 25 TABLET, FILM COATED ORAL at 12:25

## 2022-01-01 RX ADMIN — INSULIN LISPRO 3 UNITS: 100 INJECTION, SOLUTION INTRAVENOUS; SUBCUTANEOUS at 16:46

## 2022-01-01 RX ADMIN — LIDOCAINE 2 PATCH: 50 PATCH CUTANEOUS at 08:21

## 2022-01-01 RX ADMIN — NICOTINE 1 PATCH: 14 PATCH, EXTENDED RELEASE TRANSDERMAL at 08:30

## 2022-01-01 RX ADMIN — ISOSORBIDE MONONITRATE 60 MG: 60 TABLET, EXTENDED RELEASE ORAL at 08:05

## 2022-01-01 RX ADMIN — ISOSORBIDE MONONITRATE 60 MG: 60 TABLET, EXTENDED RELEASE ORAL at 08:06

## 2022-01-01 RX ADMIN — FAMOTIDINE 20 MG: 20 TABLET, FILM COATED ORAL at 08:30

## 2022-01-01 RX ADMIN — IPRATROPIUM BROMIDE AND ALBUTEROL SULFATE 3 ML: 2.5; .5 SOLUTION RESPIRATORY (INHALATION) at 06:18

## 2022-01-01 RX ADMIN — ATORVASTATIN CALCIUM 20 MG: 10 TABLET, FILM COATED ORAL at 20:34

## 2022-01-01 RX ADMIN — DEXTROSE MONOHYDRATE 25 G: 25 INJECTION, SOLUTION INTRAVENOUS at 07:58

## 2022-01-01 RX ADMIN — DEXTROSE MONOHYDRATE 25 G: 25 INJECTION, SOLUTION INTRAVENOUS at 07:26

## 2022-01-01 RX ADMIN — INSULIN LISPRO 3 UNITS: 100 INJECTION, SOLUTION INTRAVENOUS; SUBCUTANEOUS at 20:33

## 2022-01-01 RX ADMIN — PROPOFOL 40 MCG/KG/MIN: 10 INJECTION, EMULSION INTRAVENOUS at 01:42

## 2022-01-01 RX ADMIN — FAMOTIDINE 20 MG: 20 TABLET, FILM COATED ORAL at 08:05

## 2022-01-01 RX ADMIN — FAMOTIDINE 20 MG: 20 TABLET, FILM COATED ORAL at 08:20

## 2022-01-01 RX ADMIN — Medication 10 ML: at 08:14

## 2022-01-01 RX ADMIN — ENOXAPARIN SODIUM 30 MG: 30 INJECTION SUBCUTANEOUS at 14:18

## 2022-01-01 RX ADMIN — NIFEDIPINE 60 MG: 60 TABLET, EXTENDED RELEASE ORAL at 08:30

## 2022-01-01 RX ADMIN — DULOXETINE HYDROCHLORIDE 60 MG: 30 CAPSULE, DELAYED RELEASE ORAL at 10:16

## 2022-01-01 RX ADMIN — INSULIN LISPRO 2 UNITS: 100 INJECTION, SOLUTION INTRAVENOUS; SUBCUTANEOUS at 12:10

## 2022-01-01 RX ADMIN — SODIUM CHLORIDE 1000 ML: 9 INJECTION, SOLUTION INTRAVENOUS at 21:55

## 2022-01-01 RX ADMIN — SODIUM CHLORIDE 10 MG/HR: 9 INJECTION, SOLUTION INTRAVENOUS at 01:04

## 2022-01-01 RX ADMIN — SODIUM ZIRCONIUM CYCLOSILICATE 10 G: 10 POWDER, FOR SUSPENSION ORAL at 12:15

## 2022-01-01 RX ADMIN — DEXTROSE MONOHYDRATE 25 G: 25 INJECTION, SOLUTION INTRAVENOUS at 09:22

## 2022-01-01 RX ADMIN — EPOETIN ALFA-EPBX 10000 UNITS: 10000 INJECTION, SOLUTION INTRAVENOUS; SUBCUTANEOUS at 08:13

## 2022-01-01 RX ADMIN — NIFEDIPINE 30 MG: 30 TABLET, FILM COATED, EXTENDED RELEASE ORAL at 13:38

## 2022-01-01 RX ADMIN — HYDRALAZINE HYDROCHLORIDE 75 MG: 50 TABLET ORAL at 14:18

## 2022-01-01 RX ADMIN — FAMOTIDINE 20 MG: 20 TABLET, FILM COATED ORAL at 08:22

## 2022-01-01 RX ADMIN — INSULIN LISPRO 10 UNITS: 100 INJECTION, SOLUTION INTRAVENOUS; SUBCUTANEOUS at 03:35

## 2022-01-01 RX ADMIN — CARVEDILOL 25 MG: 25 TABLET, FILM COATED ORAL at 17:17

## 2022-01-01 RX ADMIN — HYDRALAZINE HYDROCHLORIDE 75 MG: 50 TABLET ORAL at 14:16

## 2022-01-01 RX ADMIN — ISOSORBIDE MONONITRATE 60 MG: 60 TABLET, EXTENDED RELEASE ORAL at 08:22

## 2022-01-01 RX ADMIN — CARVEDILOL 25 MG: 25 TABLET, FILM COATED ORAL at 13:14

## 2022-01-01 RX ADMIN — IPRATROPIUM BROMIDE AND ALBUTEROL SULFATE 3 ML: 2.5; .5 SOLUTION RESPIRATORY (INHALATION) at 10:32

## 2022-01-01 RX ADMIN — NIFEDIPINE 60 MG: 60 TABLET, EXTENDED RELEASE ORAL at 08:05

## 2022-01-01 RX ADMIN — CLONIDINE HYDROCHLORIDE 0.3 MG: 0.1 TABLET ORAL at 06:40

## 2022-01-01 RX ADMIN — NIFEDIPINE 90 MG: 30 TABLET, FILM COATED, EXTENDED RELEASE ORAL at 09:24

## 2022-01-01 RX ADMIN — NICOTINE 1 PATCH: 14 PATCH, EXTENDED RELEASE TRANSDERMAL at 09:25

## 2022-01-01 RX ADMIN — IPRATROPIUM BROMIDE AND ALBUTEROL SULFATE 3 ML: 2.5; .5 SOLUTION RESPIRATORY (INHALATION) at 16:03

## 2022-01-01 RX ADMIN — INSULIN LISPRO 2 UNITS: 100 INJECTION, SOLUTION INTRAVENOUS; SUBCUTANEOUS at 12:14

## 2022-01-01 RX ADMIN — DEXTROSE MONOHYDRATE 12.5 G: 500 INJECTION PARENTERAL at 02:54

## 2022-01-01 RX ADMIN — SODIUM CHLORIDE 1 G: 900 INJECTION INTRAVENOUS at 10:40

## 2022-01-01 RX ADMIN — ENOXAPARIN SODIUM 30 MG: 30 INJECTION SUBCUTANEOUS at 14:16

## 2022-01-01 RX ADMIN — HYDROCODONE BITARTRATE AND ACETAMINOPHEN 1 TABLET: 10; 325 TABLET ORAL at 20:29

## 2022-01-01 RX ADMIN — HYDRALAZINE HYDROCHLORIDE 75 MG: 50 TABLET ORAL at 15:30

## 2022-01-01 RX ADMIN — SODIUM ZIRCONIUM CYCLOSILICATE 10 G: 10 POWDER, FOR SUSPENSION ORAL at 16:09

## 2022-01-01 RX ADMIN — ATORVASTATIN CALCIUM 20 MG: 10 TABLET, FILM COATED ORAL at 20:04

## 2022-01-01 RX ADMIN — IPRATROPIUM BROMIDE AND ALBUTEROL SULFATE 3 ML: 2.5; .5 SOLUTION RESPIRATORY (INHALATION) at 14:28

## 2022-01-01 RX ADMIN — FAMOTIDINE 20 MG: 20 TABLET, FILM COATED ORAL at 13:14

## 2022-01-01 RX ADMIN — HYDRALAZINE HYDROCHLORIDE 75 MG: 50 TABLET ORAL at 20:57

## 2022-01-01 RX ADMIN — CARVEDILOL 25 MG: 25 TABLET, FILM COATED ORAL at 17:09

## 2022-01-01 RX ADMIN — CARVEDILOL 25 MG: 25 TABLET, FILM COATED ORAL at 17:06

## 2022-01-01 RX ADMIN — INSULIN HUMAN 10 UNITS: 100 INJECTION, SOLUTION PARENTERAL at 22:51

## 2022-01-01 RX ADMIN — CLONIDINE HYDROCHLORIDE 0.3 MG: 0.1 TABLET ORAL at 14:19

## 2022-01-01 RX ADMIN — ISOSORBIDE MONONITRATE 60 MG: 60 TABLET, EXTENDED RELEASE ORAL at 13:14

## 2022-01-01 RX ADMIN — DULOXETINE HYDROCHLORIDE 60 MG: 30 CAPSULE, DELAYED RELEASE ORAL at 13:14

## 2022-01-01 RX ADMIN — LIDOCAINE 1 PATCH: 50 PATCH CUTANEOUS at 09:25

## 2022-01-01 RX ADMIN — INSULIN LISPRO 3 UNITS: 100 INJECTION, SOLUTION INTRAVENOUS; SUBCUTANEOUS at 17:54

## 2022-01-01 RX ADMIN — HYDROCODONE BITARTRATE AND ACETAMINOPHEN 1 TABLET: 10; 325 TABLET ORAL at 17:26

## 2022-01-01 RX ADMIN — CARVEDILOL 12.5 MG: 6.25 TABLET, FILM COATED ORAL at 08:13

## 2022-01-01 RX ADMIN — HYDROCODONE BITARTRATE AND ACETAMINOPHEN 1 TABLET: 10; 325 TABLET ORAL at 04:39

## 2022-01-01 RX ADMIN — HYDRALAZINE HYDROCHLORIDE 75 MG: 50 TABLET ORAL at 05:59

## 2022-01-01 RX ADMIN — ENOXAPARIN SODIUM 30 MG: 30 INJECTION SUBCUTANEOUS at 13:03

## 2022-01-01 RX ADMIN — IPRATROPIUM BROMIDE AND ALBUTEROL SULFATE 3 ML: 2.5; .5 SOLUTION RESPIRATORY (INHALATION) at 20:30

## 2022-01-01 RX ADMIN — HYDRALAZINE HYDROCHLORIDE 50 MG: 50 TABLET ORAL at 16:15

## 2022-01-01 RX ADMIN — CALCIUM GLUCONATE 1 G: 98 INJECTION, SOLUTION INTRAVENOUS at 16:09

## 2022-01-01 RX ADMIN — DEXTROSE MONOHYDRATE 25 G: 25 INJECTION, SOLUTION INTRAVENOUS at 00:16

## 2022-01-01 RX ADMIN — HYDRALAZINE HYDROCHLORIDE 75 MG: 50 TABLET ORAL at 05:55

## 2022-01-01 RX ADMIN — HYDROCODONE BITARTRATE AND ACETAMINOPHEN 1 TABLET: 10; 325 TABLET ORAL at 00:26

## 2022-01-01 RX ADMIN — ATORVASTATIN CALCIUM 20 MG: 10 TABLET, FILM COATED ORAL at 20:57

## 2022-01-01 RX ADMIN — PROPOFOL 10 MCG/KG/MIN: 10 INJECTION, EMULSION INTRAVENOUS at 10:21

## 2022-01-01 RX ADMIN — CLONIDINE HYDROCHLORIDE 0.3 MG: 0.1 TABLET ORAL at 05:28

## 2022-01-01 RX ADMIN — INSULIN LISPRO 2 UNITS: 100 INJECTION, SOLUTION INTRAVENOUS; SUBCUTANEOUS at 16:45

## 2022-01-01 RX ADMIN — HYDRALAZINE HYDROCHLORIDE 75 MG: 50 TABLET ORAL at 20:04

## 2022-01-01 RX ADMIN — HYDRALAZINE HYDROCHLORIDE 50 MG: 50 TABLET ORAL at 22:12

## 2022-01-01 RX ADMIN — DEXTROSE MONOHYDRATE 25 G: 25 INJECTION, SOLUTION INTRAVENOUS at 21:08

## 2022-01-01 RX ADMIN — ENOXAPARIN SODIUM 30 MG: 30 INJECTION SUBCUTANEOUS at 13:46

## 2022-01-01 RX ADMIN — ROCURONIUM BROMIDE 100 MG: 10 SOLUTION INTRAVENOUS at 10:02

## 2022-01-01 RX ADMIN — HYDRALAZINE HYDROCHLORIDE 75 MG: 50 TABLET ORAL at 21:53

## 2022-01-01 RX ADMIN — HYDRALAZINE HYDROCHLORIDE 75 MG: 50 TABLET ORAL at 13:43

## 2022-01-01 RX ADMIN — DULOXETINE HYDROCHLORIDE 60 MG: 30 CAPSULE, DELAYED RELEASE ORAL at 09:25

## 2022-01-01 RX ADMIN — CARVEDILOL 25 MG: 25 TABLET, FILM COATED ORAL at 17:26

## 2022-01-01 RX ADMIN — LEVOTHYROXINE SODIUM 200 MCG: 100 TABLET ORAL at 06:25

## 2022-01-01 RX ADMIN — ACETAMINOPHEN 650 MG: 325 TABLET ORAL at 08:43

## 2022-01-01 RX ADMIN — LIDOCAINE 1 PATCH: 50 PATCH CUTANEOUS at 13:38

## 2022-01-01 RX ADMIN — FAMOTIDINE 20 MG: 20 TABLET, FILM COATED ORAL at 12:25

## 2022-01-01 RX ADMIN — ATORVASTATIN CALCIUM 20 MG: 10 TABLET, FILM COATED ORAL at 21:08

## 2022-01-01 RX ADMIN — ENOXAPARIN SODIUM 30 MG: 30 INJECTION SUBCUTANEOUS at 16:09

## 2022-01-01 RX ADMIN — DEXTROSE MONOHYDRATE 25 G: 25 INJECTION, SOLUTION INTRAVENOUS at 03:42

## 2022-01-01 RX ADMIN — IPRATROPIUM BROMIDE AND ALBUTEROL SULFATE 3 ML: 2.5; .5 SOLUTION RESPIRATORY (INHALATION) at 14:26

## 2022-01-01 RX ADMIN — CARVEDILOL 12.5 MG: 6.25 TABLET, FILM COATED ORAL at 08:11

## 2022-01-01 RX ADMIN — ISOSORBIDE MONONITRATE 60 MG: 60 TABLET, EXTENDED RELEASE ORAL at 10:14

## 2022-01-01 RX ADMIN — MORPHINE SULFATE 2 MG: 2 INJECTION, SOLUTION INTRAMUSCULAR; INTRAVENOUS at 06:25

## 2022-01-01 RX ADMIN — DULOXETINE HYDROCHLORIDE 60 MG: 30 CAPSULE, DELAYED RELEASE ORAL at 13:38

## 2022-01-01 RX ADMIN — DEXTROSE MONOHYDRATE 50 ML/HR: 100 INJECTION, SOLUTION INTRAVENOUS at 05:20

## 2022-01-01 RX ADMIN — INSULIN LISPRO 2 UNITS: 100 INJECTION, SOLUTION INTRAVENOUS; SUBCUTANEOUS at 16:03

## 2022-01-01 RX ADMIN — NYSTATIN OINTMENT 1 APPLICATION: 100000 OINTMENT TOPICAL at 08:23

## 2022-01-01 RX ADMIN — SODIUM CHLORIDE 0.1 UNITS/KG/HR: 9 INJECTION, SOLUTION INTRAVENOUS at 22:55

## 2022-01-01 RX ADMIN — FAMOTIDINE 20 MG: 20 TABLET, FILM COATED ORAL at 13:49

## 2022-01-01 RX ADMIN — IPRATROPIUM BROMIDE AND ALBUTEROL SULFATE 3 ML: 2.5; .5 SOLUTION RESPIRATORY (INHALATION) at 06:23

## 2022-01-01 RX ADMIN — POTASSIUM CHLORIDE, DEXTROSE MONOHYDRATE AND SODIUM CHLORIDE 50 ML/HR: 150; 5; 900 INJECTION, SOLUTION INTRAVENOUS at 02:35

## 2022-01-01 RX ADMIN — SENNOSIDES 1 TABLET: 8.6 TABLET, FILM COATED ORAL at 13:38

## 2022-01-01 RX ADMIN — IPRATROPIUM BROMIDE AND ALBUTEROL SULFATE 3 ML: 2.5; .5 SOLUTION RESPIRATORY (INHALATION) at 14:47

## 2022-01-01 RX ADMIN — LACTULOSE 20 G: 20 SOLUTION ORAL at 08:22

## 2022-01-01 RX ADMIN — HYDROCODONE BITARTRATE AND ACETAMINOPHEN 1 TABLET: 10; 325 TABLET ORAL at 21:02

## 2022-01-01 RX ADMIN — LACTULOSE 20 G: 20 SOLUTION ORAL at 08:25

## 2022-01-01 RX ADMIN — IPRATROPIUM BROMIDE AND ALBUTEROL SULFATE 3 ML: 2.5; .5 SOLUTION RESPIRATORY (INHALATION) at 19:44

## 2022-01-01 RX ADMIN — INSULIN LISPRO 3 UNITS: 100 INJECTION, SOLUTION INTRAVENOUS; SUBCUTANEOUS at 07:50

## 2022-01-01 RX ADMIN — HYDROCODONE BITARTRATE AND ACETAMINOPHEN 1 TABLET: 10; 325 TABLET ORAL at 08:32

## 2022-01-01 RX ADMIN — HYDROCODONE BITARTRATE AND ACETAMINOPHEN 1 TABLET: 10; 325 TABLET ORAL at 23:02

## 2022-01-01 RX ADMIN — INSULIN LISPRO 4 UNITS: 100 INJECTION, SOLUTION INTRAVENOUS; SUBCUTANEOUS at 22:02

## 2022-01-01 RX ADMIN — HYDROCODONE BITARTRATE AND ACETAMINOPHEN 1 TABLET: 10; 325 TABLET ORAL at 12:29

## 2022-01-01 RX ADMIN — INSULIN HUMAN 5 UNITS: 100 INJECTION, SOLUTION PARENTERAL at 16:08

## 2022-01-01 RX ADMIN — INSULIN LISPRO 5 UNITS: 100 INJECTION, SOLUTION INTRAVENOUS; SUBCUTANEOUS at 20:32

## 2022-01-01 RX ADMIN — LABETALOL HYDROCHLORIDE 10 MG: 5 INJECTION INTRAVENOUS at 08:16

## 2022-01-01 RX ADMIN — IPRATROPIUM BROMIDE AND ALBUTEROL SULFATE 3 ML: 2.5; .5 SOLUTION RESPIRATORY (INHALATION) at 20:35

## 2022-01-01 RX ADMIN — INSULIN LISPRO 3 UNITS: 100 INJECTION, SOLUTION INTRAVENOUS; SUBCUTANEOUS at 08:13

## 2022-01-01 RX ADMIN — DEXTROSE MONOHYDRATE 25 G: 25 INJECTION, SOLUTION INTRAVENOUS at 09:51

## 2022-01-01 RX ADMIN — HYDRALAZINE HYDROCHLORIDE 75 MG: 50 TABLET ORAL at 23:11

## 2022-01-01 RX ADMIN — HYDRALAZINE HYDROCHLORIDE 75 MG: 50 TABLET ORAL at 13:56

## 2022-01-01 RX ADMIN — IPRATROPIUM BROMIDE AND ALBUTEROL SULFATE 3 ML: 2.5; .5 SOLUTION RESPIRATORY (INHALATION) at 06:51

## 2022-01-01 RX ADMIN — CLONIDINE HYDROCHLORIDE 0.3 MG: 0.1 TABLET ORAL at 20:04

## 2022-01-01 RX ADMIN — CLONIDINE HYDROCHLORIDE 0.1 MG: 0.1 TABLET ORAL at 20:34

## 2022-01-01 RX ADMIN — CARVEDILOL 25 MG: 25 TABLET, FILM COATED ORAL at 08:05

## 2022-01-01 RX ADMIN — CLONIDINE HYDROCHLORIDE 0.1 MG: 0.1 TABLET ORAL at 20:29

## 2022-01-01 RX ADMIN — SODIUM CHLORIDE 10 MG/HR: 9 INJECTION, SOLUTION INTRAVENOUS at 22:48

## 2022-01-01 RX ADMIN — SENNOSIDES 1 TABLET: 8.6 TABLET, FILM COATED ORAL at 08:21

## 2022-01-01 RX ADMIN — HYDRALAZINE HYDROCHLORIDE 75 MG: 50 TABLET ORAL at 13:46

## 2022-01-01 RX ADMIN — CARVEDILOL 25 MG: 25 TABLET, FILM COATED ORAL at 17:55

## 2022-01-01 RX ADMIN — EPOETIN ALFA-EPBX 10000 UNITS: 10000 INJECTION, SOLUTION INTRAVENOUS; SUBCUTANEOUS at 08:36

## 2022-01-01 RX ADMIN — INSULIN DETEMIR 20 UNITS: 100 INJECTION, SOLUTION SUBCUTANEOUS at 04:47

## 2022-01-01 RX ADMIN — MORPHINE SULFATE 2 MG: 2 INJECTION, SOLUTION INTRAMUSCULAR; INTRAVENOUS at 20:32

## 2022-01-01 RX ADMIN — ETOMIDATE 30 MG: 2 INJECTION, SOLUTION INTRAVENOUS at 10:02

## 2022-01-01 RX ADMIN — SENNOSIDES 1 TABLET: 8.6 TABLET, FILM COATED ORAL at 12:25

## 2022-01-01 RX ADMIN — DEXTROSE MONOHYDRATE 25 G: 25 INJECTION, SOLUTION INTRAVENOUS at 19:59

## 2022-01-01 RX ADMIN — INSULIN LISPRO 2 UNITS: 100 INJECTION, SOLUTION INTRAVENOUS; SUBCUTANEOUS at 23:15

## 2022-01-01 RX ADMIN — MORPHINE SULFATE 2 MG: 2 INJECTION, SOLUTION INTRAMUSCULAR; INTRAVENOUS at 05:29

## 2022-01-01 RX ADMIN — INSULIN LISPRO 2 UNITS: 100 INJECTION, SOLUTION INTRAVENOUS; SUBCUTANEOUS at 20:05

## 2022-01-01 RX ADMIN — CLONIDINE HYDROCHLORIDE 0.3 MG: 0.1 TABLET ORAL at 14:04

## 2022-01-01 RX ADMIN — CLONIDINE HYDROCHLORIDE 0.3 MG: 0.1 TABLET ORAL at 13:56

## 2022-01-01 RX ADMIN — LISINOPRIL 20 MG: 20 TABLET ORAL at 08:11

## 2022-01-01 RX ADMIN — INSULIN LISPRO 2 UNITS: 100 INJECTION, SOLUTION INTRAVENOUS; SUBCUTANEOUS at 04:37

## 2022-01-01 RX ADMIN — ATORVASTATIN CALCIUM 20 MG: 10 TABLET, FILM COATED ORAL at 20:29

## 2022-01-01 RX ADMIN — PROPOFOL 40 MCG/KG/MIN: 10 INJECTION, EMULSION INTRAVENOUS at 06:08

## 2022-01-01 RX ADMIN — CLONIDINE HYDROCHLORIDE 0.3 MG: 0.1 TABLET ORAL at 05:55

## 2022-01-01 RX ADMIN — DULOXETINE HYDROCHLORIDE 60 MG: 30 CAPSULE, DELAYED RELEASE ORAL at 12:26

## 2022-01-01 RX ADMIN — ACETAMINOPHEN 650 MG: 325 TABLET ORAL at 16:24

## 2022-01-01 RX ADMIN — CARVEDILOL 25 MG: 25 TABLET, FILM COATED ORAL at 08:22

## 2022-01-01 RX ADMIN — SODIUM CHLORIDE 10 MG/HR: 9 INJECTION, SOLUTION INTRAVENOUS at 13:03

## 2022-01-01 RX ADMIN — IPRATROPIUM BROMIDE AND ALBUTEROL SULFATE 3 ML: 2.5; .5 SOLUTION RESPIRATORY (INHALATION) at 19:11

## 2022-01-01 RX ADMIN — HYDRALAZINE HYDROCHLORIDE 75 MG: 50 TABLET ORAL at 20:34

## 2022-01-01 RX ADMIN — CARVEDILOL 25 MG: 25 TABLET, FILM COATED ORAL at 17:32

## 2022-01-01 RX ADMIN — HYDRALAZINE HYDROCHLORIDE 75 MG: 50 TABLET ORAL at 06:40

## 2022-01-01 RX ADMIN — ATORVASTATIN CALCIUM 20 MG: 10 TABLET, FILM COATED ORAL at 20:44

## 2022-01-01 RX ADMIN — ENOXAPARIN SODIUM 30 MG: 30 INJECTION SUBCUTANEOUS at 15:26

## 2022-01-01 RX ADMIN — LEVOTHYROXINE SODIUM 50 MCG: 0.05 TABLET ORAL at 05:55

## 2022-01-01 RX ADMIN — Medication 10 ML: at 20:32

## 2022-01-01 RX ADMIN — CARVEDILOL 25 MG: 25 TABLET, FILM COATED ORAL at 13:38

## 2022-01-01 RX ADMIN — MORPHINE SULFATE 2 MG: 2 INJECTION, SOLUTION INTRAMUSCULAR; INTRAVENOUS at 13:03

## 2022-01-01 RX ADMIN — HYDROCODONE BITARTRATE AND ACETAMINOPHEN 1 TABLET: 10; 325 TABLET ORAL at 03:25

## 2022-01-01 RX ADMIN — INSULIN LISPRO 3 UNITS: 100 INJECTION, SOLUTION INTRAVENOUS; SUBCUTANEOUS at 01:07

## 2022-01-01 RX ADMIN — NIFEDIPINE 60 MG: 60 TABLET, EXTENDED RELEASE ORAL at 08:07

## 2022-01-01 RX ADMIN — INSULIN LISPRO 2 UNITS: 100 INJECTION, SOLUTION INTRAVENOUS; SUBCUTANEOUS at 04:46

## 2022-01-01 RX ADMIN — SODIUM CHLORIDE 5 MG/HR: 9 INJECTION, SOLUTION INTRAVENOUS at 05:54

## 2022-01-01 RX ADMIN — IPRATROPIUM BROMIDE AND ALBUTEROL SULFATE 3 ML: 2.5; .5 SOLUTION RESPIRATORY (INHALATION) at 10:58

## 2022-01-01 RX ADMIN — Medication 10 ML: at 13:03

## 2022-01-01 RX ADMIN — ENOXAPARIN SODIUM 30 MG: 30 INJECTION SUBCUTANEOUS at 15:53

## 2022-01-01 RX ADMIN — ONDANSETRON 4 MG: 4 TABLET, ORALLY DISINTEGRATING ORAL at 08:23

## 2022-01-01 RX ADMIN — INSULIN LISPRO 2 UNITS: 100 INJECTION, SOLUTION INTRAVENOUS; SUBCUTANEOUS at 20:57

## 2022-01-01 RX ADMIN — POTASSIUM CHLORIDE 40 MEQ: 1.5 POWDER, FOR SOLUTION ORAL at 02:53

## 2022-01-01 RX ADMIN — CARVEDILOL 25 MG: 25 TABLET, FILM COATED ORAL at 17:04

## 2022-01-01 RX ADMIN — HYDROCODONE BITARTRATE AND ACETAMINOPHEN 1 TABLET: 10; 325 TABLET ORAL at 20:42

## 2022-01-01 RX ADMIN — ENOXAPARIN SODIUM 30 MG: 30 INJECTION SUBCUTANEOUS at 13:38

## 2022-01-01 RX ADMIN — INSULIN LISPRO 2 UNITS: 100 INJECTION, SOLUTION INTRAVENOUS; SUBCUTANEOUS at 13:38

## 2022-01-01 RX ADMIN — ACETAMINOPHEN 650 MG: 325 TABLET ORAL at 17:54

## 2022-01-01 RX ADMIN — CHLORHEXIDINE GLUCONATE 15 ML: 1.2 SOLUTION ORAL at 08:13

## 2022-01-01 RX ADMIN — HYDROCODONE BITARTRATE AND ACETAMINOPHEN 1 TABLET: 10; 325 TABLET ORAL at 13:40

## 2022-01-01 RX ADMIN — ENOXAPARIN SODIUM 30 MG: 30 INJECTION SUBCUTANEOUS at 13:56

## 2022-01-01 RX ADMIN — INSULIN LISPRO 2 UNITS: 100 INJECTION, SOLUTION INTRAVENOUS; SUBCUTANEOUS at 04:43

## 2022-01-01 RX ADMIN — EPOETIN ALFA-EPBX 10000 UNITS: 10000 INJECTION, SOLUTION INTRAVENOUS; SUBCUTANEOUS at 17:55

## 2022-01-01 RX ADMIN — EPOETIN ALFA-EPBX 10000 UNITS: 10000 INJECTION, SOLUTION INTRAVENOUS; SUBCUTANEOUS at 08:22

## 2022-01-01 RX ADMIN — Medication 10 ML: at 08:11

## 2022-01-01 RX ADMIN — IPRATROPIUM BROMIDE AND ALBUTEROL SULFATE 3 ML: 2.5; .5 SOLUTION RESPIRATORY (INHALATION) at 10:27

## 2022-01-01 RX ADMIN — LORAZEPAM 1 MG: 2 INJECTION INTRAMUSCULAR; INTRAVENOUS at 13:03

## 2022-01-01 RX ADMIN — ATORVASTATIN CALCIUM 20 MG: 10 TABLET, FILM COATED ORAL at 20:28

## 2022-01-01 RX ADMIN — ENOXAPARIN SODIUM 30 MG: 30 INJECTION SUBCUTANEOUS at 13:43

## 2022-01-01 RX ADMIN — CLONIDINE HYDROCHLORIDE 0.3 MG: 0.1 TABLET ORAL at 14:40

## 2022-01-01 RX ADMIN — ENOXAPARIN SODIUM 30 MG: 30 INJECTION SUBCUTANEOUS at 14:41

## 2022-01-01 RX ADMIN — ENOXAPARIN SODIUM 30 MG: 30 INJECTION SUBCUTANEOUS at 13:13

## 2022-01-01 RX ADMIN — DEXTROSE MONOHYDRATE 25 G: 25 INJECTION, SOLUTION INTRAVENOUS at 04:45

## 2022-01-01 RX ADMIN — NICOTINE 1 PATCH: 14 PATCH, EXTENDED RELEASE TRANSDERMAL at 08:23

## 2022-01-01 RX ADMIN — IPRATROPIUM BROMIDE AND ALBUTEROL SULFATE 3 ML: 2.5; .5 SOLUTION RESPIRATORY (INHALATION) at 14:11

## 2022-01-01 RX ADMIN — HYDROCODONE BITARTRATE AND ACETAMINOPHEN 1 TABLET: 10; 325 TABLET ORAL at 04:38

## 2022-01-01 RX ADMIN — ENOXAPARIN SODIUM 30 MG: 30 INJECTION SUBCUTANEOUS at 12:29

## 2022-01-01 RX ADMIN — LEVOTHYROXINE SODIUM 200 MCG: 100 TABLET ORAL at 06:47

## 2022-01-01 RX ADMIN — Medication 10 ML: at 20:06

## 2022-01-01 RX ADMIN — HYDRALAZINE HYDROCHLORIDE 75 MG: 50 TABLET ORAL at 14:04

## 2022-01-01 RX ADMIN — LIDOCAINE 2 PATCH: 50 PATCH CUTANEOUS at 12:31

## 2022-01-01 RX ADMIN — INSULIN LISPRO 3 UNITS: 100 INJECTION, SOLUTION INTRAVENOUS; SUBCUTANEOUS at 17:00

## 2022-01-01 RX ADMIN — INSULIN LISPRO 2 UNITS: 100 INJECTION, SOLUTION INTRAVENOUS; SUBCUTANEOUS at 16:47

## 2022-01-01 RX ADMIN — Medication 10 ML: at 20:14

## 2022-01-01 RX ADMIN — INSULIN DETEMIR 10 UNITS: 100 INJECTION, SOLUTION SUBCUTANEOUS at 22:03

## 2022-01-01 RX ADMIN — SENNOSIDES 1 TABLET: 8.6 TABLET, FILM COATED ORAL at 09:24

## 2022-01-01 RX ADMIN — CLONIDINE 1 PATCH: 0.1 PATCH TRANSDERMAL at 20:58

## 2022-01-01 RX ADMIN — IPRATROPIUM BROMIDE AND ALBUTEROL SULFATE 3 ML: 2.5; .5 SOLUTION RESPIRATORY (INHALATION) at 19:16

## 2022-01-01 RX ADMIN — HYDRALAZINE HYDROCHLORIDE 75 MG: 50 TABLET ORAL at 00:31

## 2022-01-01 RX ADMIN — NIFEDIPINE 90 MG: 30 TABLET, FILM COATED, EXTENDED RELEASE ORAL at 12:26

## 2022-01-01 RX ADMIN — LEVOTHYROXINE SODIUM 50 MCG: 0.05 TABLET ORAL at 05:31

## 2022-01-01 RX ADMIN — FAMOTIDINE 20 MG: 10 INJECTION INTRAVENOUS at 13:04

## 2022-01-01 RX ADMIN — SODIUM ZIRCONIUM CYCLOSILICATE 10 G: 10 POWDER, FOR SUSPENSION ORAL at 16:57

## 2022-01-01 RX ADMIN — SENNOSIDES 1 TABLET: 8.6 TABLET, FILM COATED ORAL at 16:10

## 2022-01-01 RX ADMIN — SENNOSIDES 1 TABLET: 8.6 TABLET, FILM COATED ORAL at 08:22

## 2022-01-01 RX ADMIN — ATORVASTATIN CALCIUM 20 MG: 10 TABLET, FILM COATED ORAL at 20:33

## 2022-01-01 RX ADMIN — LIDOCAINE 2 PATCH: 50 PATCH CUTANEOUS at 08:20

## 2022-01-01 RX ADMIN — INSULIN LISPRO 3 UNITS: 100 INJECTION, SOLUTION INTRAVENOUS; SUBCUTANEOUS at 20:44

## 2022-01-01 RX ADMIN — INSULIN LISPRO 3 UNITS: 100 INJECTION, SOLUTION INTRAVENOUS; SUBCUTANEOUS at 00:50

## 2022-01-01 RX ADMIN — MORPHINE SULFATE 2 MG: 2 INJECTION, SOLUTION INTRAMUSCULAR; INTRAVENOUS at 14:04

## 2022-01-01 RX ADMIN — HYDROCODONE BITARTRATE AND ACETAMINOPHEN 1 TABLET: 10; 325 TABLET ORAL at 15:26

## 2022-01-01 RX ADMIN — Medication 10 ML: at 20:35

## 2022-01-01 RX ADMIN — HYDRALAZINE HYDROCHLORIDE 75 MG: 50 TABLET ORAL at 14:15

## 2022-01-01 RX ADMIN — IPRATROPIUM BROMIDE AND ALBUTEROL SULFATE 3 ML: 2.5; .5 SOLUTION RESPIRATORY (INHALATION) at 06:25

## 2022-01-01 RX ADMIN — INSULIN LISPRO 2 UNITS: 100 INJECTION, SOLUTION INTRAVENOUS; SUBCUTANEOUS at 11:31

## 2022-01-01 RX ADMIN — HYDRALAZINE HYDROCHLORIDE 75 MG: 50 TABLET ORAL at 06:41

## 2022-01-01 RX ADMIN — GLUCAGON HYDROCHLORIDE 1 MG: KIT at 06:54

## 2022-01-01 RX ADMIN — ATORVASTATIN CALCIUM 20 MG: 10 TABLET, FILM COATED ORAL at 20:45

## 2022-01-01 RX ADMIN — INSULIN LISPRO 3 UNITS: 100 INJECTION, SOLUTION INTRAVENOUS; SUBCUTANEOUS at 17:26

## 2022-01-01 RX ADMIN — INSULIN DETEMIR 10 UNITS: 100 INJECTION, SOLUTION SUBCUTANEOUS at 20:33

## 2022-01-01 RX ADMIN — CARVEDILOL 25 MG: 25 TABLET, FILM COATED ORAL at 20:57

## 2022-01-01 RX ADMIN — HYDROCODONE BITARTRATE AND ACETAMINOPHEN 1 TABLET: 10; 325 TABLET ORAL at 15:06

## 2022-01-01 RX ADMIN — IPRATROPIUM BROMIDE AND ALBUTEROL SULFATE 3 ML: 2.5; .5 SOLUTION RESPIRATORY (INHALATION) at 06:21

## 2022-01-01 RX ADMIN — NICOTINE 1 PATCH: 14 PATCH, EXTENDED RELEASE TRANSDERMAL at 08:25

## 2022-01-01 RX ADMIN — HYDROCODONE BITARTRATE AND ACETAMINOPHEN 1 TABLET: 10; 325 TABLET ORAL at 06:47

## 2022-01-01 RX ADMIN — IPRATROPIUM BROMIDE AND ALBUTEROL SULFATE 3 ML: 2.5; .5 SOLUTION RESPIRATORY (INHALATION) at 06:02

## 2022-01-01 RX ADMIN — SODIUM ZIRCONIUM CYCLOSILICATE 10 G: 10 POWDER, FOR SUSPENSION ORAL at 12:07

## 2022-01-01 RX ADMIN — LEVOTHYROXINE SODIUM 200 MCG: 100 TABLET ORAL at 06:41

## 2022-01-01 RX ADMIN — FAMOTIDINE 20 MG: 20 TABLET, FILM COATED ORAL at 09:25

## 2022-01-01 RX ADMIN — HYDRALAZINE HYDROCHLORIDE 75 MG: 50 TABLET ORAL at 20:29

## 2022-01-01 RX ADMIN — IPRATROPIUM BROMIDE AND ALBUTEROL SULFATE 3 ML: 2.5; .5 SOLUTION RESPIRATORY (INHALATION) at 10:55

## 2022-01-01 RX ADMIN — INSULIN HUMAN 5 UNITS: 100 INJECTION, SOLUTION PARENTERAL at 11:33

## 2022-01-01 RX ADMIN — SODIUM ZIRCONIUM CYCLOSILICATE 10 G: 10 POWDER, FOR SUSPENSION ORAL at 14:13

## 2022-01-01 RX ADMIN — HYDROCODONE BITARTRATE AND ACETAMINOPHEN 1 TABLET: 10; 325 TABLET ORAL at 14:49

## 2022-01-01 RX ADMIN — HYDROCODONE BITARTRATE AND ACETAMINOPHEN 1 TABLET: 10; 325 TABLET ORAL at 06:05

## 2022-01-01 RX ADMIN — DULOXETINE HYDROCHLORIDE 60 MG: 30 CAPSULE, DELAYED RELEASE ORAL at 08:21

## 2022-01-01 RX ADMIN — CARVEDILOL 25 MG: 25 TABLET, FILM COATED ORAL at 20:44

## 2022-01-01 RX ADMIN — IPRATROPIUM BROMIDE AND ALBUTEROL SULFATE 3 ML: 2.5; .5 SOLUTION RESPIRATORY (INHALATION) at 06:43

## 2022-01-01 RX ADMIN — IPRATROPIUM BROMIDE AND ALBUTEROL SULFATE 3 ML: 2.5; .5 SOLUTION RESPIRATORY (INHALATION) at 06:26

## 2022-01-01 RX ADMIN — DEXTROSE MONOHYDRATE 25 G: 25 INJECTION, SOLUTION INTRAVENOUS at 04:04

## 2022-01-01 RX ADMIN — MORPHINE SULFATE 2 MG: 2 INJECTION, SOLUTION INTRAMUSCULAR; INTRAVENOUS at 22:02

## 2022-01-01 RX ADMIN — IPRATROPIUM BROMIDE AND ALBUTEROL SULFATE 3 ML: 2.5; .5 SOLUTION RESPIRATORY (INHALATION) at 14:22

## 2022-01-01 RX ADMIN — ISOSORBIDE MONONITRATE 60 MG: 60 TABLET, EXTENDED RELEASE ORAL at 13:38

## 2022-01-01 RX ADMIN — HYDRALAZINE HYDROCHLORIDE 75 MG: 50 TABLET ORAL at 21:08

## 2022-01-01 RX ADMIN — CHLORHEXIDINE GLUCONATE 15 ML: 1.2 SOLUTION ORAL at 20:13

## 2022-01-01 RX ADMIN — HYDRALAZINE HYDROCHLORIDE 75 MG: 50 TABLET ORAL at 22:56

## 2022-01-01 RX ADMIN — IPRATROPIUM BROMIDE AND ALBUTEROL SULFATE 3 ML: 2.5; .5 SOLUTION RESPIRATORY (INHALATION) at 20:27

## 2022-01-01 RX ADMIN — DEXTROSE MONOHYDRATE 25 G: 25 INJECTION, SOLUTION INTRAVENOUS at 13:31

## 2022-01-01 RX ADMIN — IPRATROPIUM BROMIDE AND ALBUTEROL SULFATE 3 ML: 2.5; .5 SOLUTION RESPIRATORY (INHALATION) at 14:06

## 2022-01-01 RX ADMIN — DEXTROSE MONOHYDRATE 12.5 G: 500 INJECTION PARENTERAL at 01:49

## 2022-01-01 RX ADMIN — CARVEDILOL 25 MG: 25 TABLET, FILM COATED ORAL at 08:30

## 2022-01-01 RX ADMIN — IPRATROPIUM BROMIDE AND ALBUTEROL SULFATE 3 ML: 2.5; .5 SOLUTION RESPIRATORY (INHALATION) at 18:45

## 2022-01-01 RX ADMIN — DEXTROSE MONOHYDRATE 12.5 G: 500 INJECTION PARENTERAL at 00:21

## 2022-01-01 RX ADMIN — INSULIN LISPRO 3 UNITS: 100 INJECTION, SOLUTION INTRAVENOUS; SUBCUTANEOUS at 00:31

## 2022-01-01 RX ADMIN — INSULIN DETEMIR 5 UNITS: 100 INJECTION, SOLUTION SUBCUTANEOUS at 20:31

## 2022-01-01 RX ADMIN — ASPIRIN 300 MG: 300 SUPPOSITORY RECTAL at 11:23

## 2022-01-01 RX ADMIN — PROPOFOL 40 MCG/KG/MIN: 10 INJECTION, EMULSION INTRAVENOUS at 19:49

## 2022-01-01 RX ADMIN — NICOTINE 1 PATCH: 14 PATCH, EXTENDED RELEASE TRANSDERMAL at 12:31

## 2022-01-01 RX ADMIN — HYDRALAZINE HYDROCHLORIDE 50 MG: 50 TABLET ORAL at 05:31

## 2022-01-01 RX ADMIN — LACTULOSE 20 G: 20 SOLUTION ORAL at 08:21

## 2022-01-01 RX ADMIN — LABETALOL HYDROCHLORIDE 10 MG: 5 INJECTION INTRAVENOUS at 04:11

## 2022-01-01 RX ADMIN — HYDRALAZINE HYDROCHLORIDE 75 MG: 50 TABLET ORAL at 17:06

## 2022-01-01 RX ADMIN — MORPHINE SULFATE 2 MG: 2 INJECTION, SOLUTION INTRAMUSCULAR; INTRAVENOUS at 23:56

## 2022-01-01 RX ADMIN — LACTULOSE 20 G: 20 SOLUTION ORAL at 12:25

## 2022-01-01 RX ADMIN — CARVEDILOL 25 MG: 25 TABLET, FILM COATED ORAL at 08:23

## 2022-01-01 RX ADMIN — FAMOTIDINE 20 MG: 20 TABLET, FILM COATED ORAL at 10:16

## 2022-01-01 RX ADMIN — HYDRALAZINE HYDROCHLORIDE 75 MG: 50 TABLET ORAL at 06:47

## 2022-01-01 RX ADMIN — CLONIDINE HYDROCHLORIDE 0.1 MG: 0.1 TABLET ORAL at 12:26

## 2022-01-01 RX ADMIN — MORPHINE SULFATE 2 MG: 2 INJECTION, SOLUTION INTRAMUSCULAR; INTRAVENOUS at 06:40

## 2022-01-01 RX ADMIN — IPRATROPIUM BROMIDE AND ALBUTEROL SULFATE 3 ML: 2.5; .5 SOLUTION RESPIRATORY (INHALATION) at 07:10

## 2022-01-01 RX ADMIN — CLONIDINE HYDROCHLORIDE 0.1 MG: 0.1 TABLET ORAL at 20:33

## 2022-01-01 RX ADMIN — DULOXETINE HYDROCHLORIDE 60 MG: 30 CAPSULE, DELAYED RELEASE ORAL at 08:30

## 2022-01-01 RX ADMIN — ATORVASTATIN CALCIUM 20 MG: 10 TABLET, FILM COATED ORAL at 21:53

## 2022-01-01 RX ADMIN — ISOSORBIDE MONONITRATE 60 MG: 60 TABLET, EXTENDED RELEASE ORAL at 08:21

## 2022-01-01 RX ADMIN — HYDROCODONE BITARTRATE AND ACETAMINOPHEN 1 TABLET: 10; 325 TABLET ORAL at 14:25

## 2022-01-01 RX ADMIN — NIFEDIPINE 90 MG: 30 TABLET, FILM COATED, EXTENDED RELEASE ORAL at 08:20

## 2022-01-01 RX ADMIN — IPRATROPIUM BROMIDE AND ALBUTEROL SULFATE 3 ML: 2.5; .5 SOLUTION RESPIRATORY (INHALATION) at 06:14

## 2022-01-01 RX ADMIN — LACTULOSE 20 G: 20 SOLUTION ORAL at 09:24

## 2022-01-01 RX ADMIN — NICOTINE 1 PATCH: 14 PATCH, EXTENDED RELEASE TRANSDERMAL at 08:19

## 2022-01-01 RX ADMIN — INSULIN LISPRO 4 UNITS: 100 INJECTION, SOLUTION INTRAVENOUS; SUBCUTANEOUS at 23:01

## 2022-01-01 RX ADMIN — IPRATROPIUM BROMIDE AND ALBUTEROL SULFATE 3 ML: 2.5; .5 SOLUTION RESPIRATORY (INHALATION) at 20:03

## 2022-01-01 RX ADMIN — DEXTROSE MONOHYDRATE 50 ML: 500 INJECTION PARENTERAL at 16:09

## 2022-01-01 RX ADMIN — ISOSORBIDE MONONITRATE 60 MG: 60 TABLET, EXTENDED RELEASE ORAL at 08:23

## 2022-01-01 RX ADMIN — LABETALOL HYDROCHLORIDE 10 MG: 5 INJECTION INTRAVENOUS at 14:41

## 2022-01-01 RX ADMIN — SODIUM CHLORIDE 5 MG/HR: 9 INJECTION, SOLUTION INTRAVENOUS at 10:23

## 2022-01-01 RX ADMIN — ISOSORBIDE MONONITRATE 60 MG: 60 TABLET, EXTENDED RELEASE ORAL at 12:25

## 2022-01-01 RX ADMIN — INSULIN DETEMIR 5 UNITS: 100 INJECTION, SOLUTION SUBCUTANEOUS at 20:32

## 2022-01-01 RX ADMIN — SENNOSIDES 1 TABLET: 8.6 TABLET, FILM COATED ORAL at 08:07

## 2022-01-01 RX ADMIN — DEXTROSE MONOHYDRATE 25 G: 25 INJECTION, SOLUTION INTRAVENOUS at 06:51

## 2022-01-01 RX ADMIN — CLONIDINE HYDROCHLORIDE 0.3 MG: 0.1 TABLET ORAL at 00:31

## 2022-01-01 RX ADMIN — IPRATROPIUM BROMIDE AND ALBUTEROL SULFATE 3 ML: 2.5; .5 SOLUTION RESPIRATORY (INHALATION) at 10:15

## 2022-01-01 RX ADMIN — LEVOTHYROXINE SODIUM 200 MCG: 100 TABLET ORAL at 05:34

## 2022-01-01 RX ADMIN — DEXTROSE MONOHYDRATE 25 G: 25 INJECTION, SOLUTION INTRAVENOUS at 17:53

## 2022-01-01 RX ADMIN — HYDROCODONE BITARTRATE AND ACETAMINOPHEN 1 TABLET: 10; 325 TABLET ORAL at 19:07

## 2022-01-01 ASSESSMENT — ENCOUNTER SYMPTOMS
ABDOMINAL PAIN: 0
DIARRHEA: 0
NAUSEA: 0
SORE THROAT: 0
BACK PAIN: 0
SHORTNESS OF BREATH: 0
VOMITING: 0
RHINORRHEA: 0

## 2022-01-02 LAB
AMMONIA: 24 UMOL/L (ref 11–51)
ANION GAP SERPL CALCULATED.3IONS-SCNC: 15 MMOL/L (ref 7–19)
ANION GAP SERPL CALCULATED.3IONS-SCNC: 17 MMOL/L (ref 7–19)
BUN BLDV-MCNC: 41 MG/DL (ref 6–20)
BUN BLDV-MCNC: 41 MG/DL (ref 6–20)
CALCIUM SERPL-MCNC: 8.1 MG/DL (ref 8.6–10)
CALCIUM SERPL-MCNC: 8.3 MG/DL (ref 8.6–10)
CHLORIDE BLD-SCNC: 83 MMOL/L (ref 98–111)
CHLORIDE BLD-SCNC: 88 MMOL/L (ref 98–111)
CO2: 21 MMOL/L (ref 22–29)
CO2: 22 MMOL/L (ref 22–29)
CREAT SERPL-MCNC: 4.5 MG/DL (ref 0.5–0.9)
CREAT SERPL-MCNC: 4.7 MG/DL (ref 0.5–0.9)
FERRITIN: 1335 NG/ML (ref 13–150)
FOLATE: 8.7 NG/ML (ref 4.8–37.3)
GFR AFRICAN AMERICAN: 12
GFR AFRICAN AMERICAN: 12
GFR NON-AFRICAN AMERICAN: 10
GFR NON-AFRICAN AMERICAN: 10
GLUCOSE BLD-MCNC: 105 MG/DL (ref 70–99)
GLUCOSE BLD-MCNC: 117 MG/DL (ref 70–99)
GLUCOSE BLD-MCNC: 122 MG/DL (ref 74–109)
GLUCOSE BLD-MCNC: 173 MG/DL (ref 70–99)
GLUCOSE BLD-MCNC: 179 MG/DL (ref 70–99)
GLUCOSE BLD-MCNC: 277 MG/DL (ref 70–99)
GLUCOSE BLD-MCNC: 356 MG/DL (ref 70–99)
GLUCOSE BLD-MCNC: 480 MG/DL (ref 70–99)
GLUCOSE BLD-MCNC: 607 MG/DL (ref 74–109)
GLUCOSE BLD-MCNC: 63 MG/DL (ref 70–99)
GLUCOSE BLD-MCNC: 66 MG/DL (ref 70–99)
GLUCOSE BLD-MCNC: 745 MG/DL (ref 74–109)
HCT VFR BLD CALC: 28.4 % (ref 37–47)
HCT VFR BLD CALC: 28.6 % (ref 37–47)
HEMOGLOBIN: 8.9 G/DL (ref 12–16)
HEMOGLOBIN: 9 G/DL (ref 12–16)
IRON SATURATION: 56 % (ref 14–50)
IRON: 85 UG/DL (ref 37–145)
MAGNESIUM: 2.3 MG/DL (ref 1.6–2.6)
MCH RBC QN AUTO: 28.5 PG (ref 27–31)
MCH RBC QN AUTO: 29 PG (ref 27–31)
MCHC RBC AUTO-ENTMCNC: 31.3 G/DL (ref 33–37)
MCHC RBC AUTO-ENTMCNC: 31.5 G/DL (ref 33–37)
MCV RBC AUTO: 91 FL (ref 81–99)
MCV RBC AUTO: 92.3 FL (ref 81–99)
PARATHYROID HORMONE INTACT: 172.6 PG/ML (ref 15–65)
PDW BLD-RTO: 14.4 % (ref 11.5–14.5)
PDW BLD-RTO: 14.6 % (ref 11.5–14.5)
PERFORMED ON: ABNORMAL
PHOSPHORUS: 4.8 MG/DL (ref 2.5–4.5)
PLATELET # BLD: 84 K/UL (ref 130–400)
PLATELET # BLD: 88 K/UL (ref 130–400)
PMV BLD AUTO: 9.5 FL (ref 9.4–12.3)
PMV BLD AUTO: 9.8 FL (ref 9.4–12.3)
POTASSIUM REFLEX MAGNESIUM: 4 MMOL/L (ref 3.5–5)
POTASSIUM SERPL-SCNC: 3.6 MMOL/L (ref 3.5–5)
RBC # BLD: 3.1 M/UL (ref 4.2–5.4)
RBC # BLD: 3.12 M/UL (ref 4.2–5.4)
SODIUM BLD-SCNC: 120 MMOL/L (ref 136–145)
SODIUM BLD-SCNC: 126 MMOL/L (ref 136–145)
TOTAL IRON BINDING CAPACITY: 152 UG/DL (ref 250–400)
TROPONIN: 0.09 NG/ML (ref 0–0.03)
TROPONIN: 0.09 NG/ML (ref 0–0.03)
VITAMIN B-12: 478 PG/ML (ref 211–946)
VITAMIN D 25-HYDROXY: 26.4 NG/ML
WBC # BLD: 4.6 K/UL (ref 4.8–10.8)
WBC # BLD: 4.7 K/UL (ref 4.8–10.8)

## 2022-01-02 PROCEDURE — 6360000002 HC RX W HCPCS: Performed by: HOSPITALIST

## 2022-01-02 PROCEDURE — 84484 ASSAY OF TROPONIN QUANT: CPT

## 2022-01-02 PROCEDURE — 6370000000 HC RX 637 (ALT 250 FOR IP): Performed by: INTERNAL MEDICINE

## 2022-01-02 PROCEDURE — 83735 ASSAY OF MAGNESIUM: CPT

## 2022-01-02 PROCEDURE — 6370000000 HC RX 637 (ALT 250 FOR IP): Performed by: HOSPITALIST

## 2022-01-02 PROCEDURE — 82947 ASSAY GLUCOSE BLOOD QUANT: CPT

## 2022-01-02 PROCEDURE — 83970 ASSAY OF PARATHORMONE: CPT

## 2022-01-02 PROCEDURE — 5A1D70Z PERFORMANCE OF URINARY FILTRATION, INTERMITTENT, LESS THAN 6 HOURS PER DAY: ICD-10-PCS | Performed by: HOSPITALIST

## 2022-01-02 PROCEDURE — 83540 ASSAY OF IRON: CPT

## 2022-01-02 PROCEDURE — 84100 ASSAY OF PHOSPHORUS: CPT

## 2022-01-02 PROCEDURE — 82746 ASSAY OF FOLIC ACID SERUM: CPT

## 2022-01-02 PROCEDURE — 80048 BASIC METABOLIC PNL TOTAL CA: CPT

## 2022-01-02 PROCEDURE — 82728 ASSAY OF FERRITIN: CPT

## 2022-01-02 PROCEDURE — 83550 IRON BINDING TEST: CPT

## 2022-01-02 PROCEDURE — 82607 VITAMIN B-12: CPT

## 2022-01-02 PROCEDURE — 2580000003 HC RX 258: Performed by: EMERGENCY MEDICINE

## 2022-01-02 PROCEDURE — 2580000003 HC RX 258: Performed by: HOSPITALIST

## 2022-01-02 PROCEDURE — 36415 COLL VENOUS BLD VENIPUNCTURE: CPT

## 2022-01-02 PROCEDURE — 82140 ASSAY OF AMMONIA: CPT

## 2022-01-02 PROCEDURE — 82306 VITAMIN D 25 HYDROXY: CPT

## 2022-01-02 PROCEDURE — 85027 COMPLETE CBC AUTOMATED: CPT

## 2022-01-02 PROCEDURE — 6370000000 HC RX 637 (ALT 250 FOR IP): Performed by: EMERGENCY MEDICINE

## 2022-01-02 PROCEDURE — 2000000000 HC ICU R&B

## 2022-01-02 PROCEDURE — 8010000000 HC HEMODIALYSIS ACUTE INPT

## 2022-01-02 RX ORDER — ISOSORBIDE MONONITRATE 60 MG/1
120 TABLET, EXTENDED RELEASE ORAL DAILY
Status: DISCONTINUED | OUTPATIENT
Start: 2022-01-02 | End: 2022-01-05 | Stop reason: HOSPADM

## 2022-01-02 RX ORDER — DULOXETIN HYDROCHLORIDE 60 MG/1
60 CAPSULE, DELAYED RELEASE ORAL DAILY
Status: DISCONTINUED | OUTPATIENT
Start: 2022-01-02 | End: 2022-01-02

## 2022-01-02 RX ORDER — LACTULOSE 10 G/15ML
15 SOLUTION ORAL 2 TIMES DAILY
Status: DISCONTINUED | OUTPATIENT
Start: 2022-01-02 | End: 2022-01-05 | Stop reason: HOSPADM

## 2022-01-02 RX ORDER — HYDRALAZINE HYDROCHLORIDE 20 MG/ML
5 INJECTION INTRAMUSCULAR; INTRAVENOUS EVERY 4 HOURS PRN
Status: DISCONTINUED | OUTPATIENT
Start: 2022-01-02 | End: 2022-01-05 | Stop reason: HOSPADM

## 2022-01-02 RX ORDER — CLONIDINE 0.1 MG/24H
1 PATCH, EXTENDED RELEASE TRANSDERMAL WEEKLY
Status: DISCONTINUED | OUTPATIENT
Start: 2022-01-02 | End: 2022-01-05 | Stop reason: HOSPADM

## 2022-01-02 RX ORDER — CEFUROXIME AXETIL 250 MG/1
250 TABLET ORAL DAILY
Status: ON HOLD | COMMUNITY
Start: 2021-12-29 | End: 2022-01-05 | Stop reason: HOSPADM

## 2022-01-02 RX ORDER — DEXTROSE MONOHYDRATE 50 MG/ML
100 INJECTION, SOLUTION INTRAVENOUS PRN
Status: DISCONTINUED | OUTPATIENT
Start: 2022-01-02 | End: 2022-01-05 | Stop reason: HOSPADM

## 2022-01-02 RX ORDER — NIFEDIPINE 30 MG/1
90 TABLET, EXTENDED RELEASE ORAL DAILY
Status: DISCONTINUED | OUTPATIENT
Start: 2022-01-02 | End: 2022-01-05 | Stop reason: HOSPADM

## 2022-01-02 RX ORDER — ERGOCALCIFEROL 1.25 MG/1
50000 CAPSULE ORAL WEEKLY
Status: DISCONTINUED | OUTPATIENT
Start: 2022-01-02 | End: 2022-01-05 | Stop reason: HOSPADM

## 2022-01-02 RX ORDER — SENNA PLUS 8.6 MG/1
1 TABLET ORAL DAILY
Status: DISCONTINUED | OUTPATIENT
Start: 2022-01-02 | End: 2022-01-05 | Stop reason: HOSPADM

## 2022-01-02 RX ORDER — INSULIN GLARGINE 100 [IU]/ML
25 INJECTION, SOLUTION SUBCUTANEOUS NIGHTLY
Status: DISCONTINUED | OUTPATIENT
Start: 2022-01-02 | End: 2022-01-03

## 2022-01-02 RX ORDER — ATORVASTATIN CALCIUM 20 MG/1
20 TABLET, FILM COATED ORAL DAILY
Status: DISCONTINUED | OUTPATIENT
Start: 2022-01-02 | End: 2022-01-05 | Stop reason: HOSPADM

## 2022-01-02 RX ORDER — LEVOTHYROXINE SODIUM 0.07 MG/1
150 TABLET ORAL DAILY
Status: DISCONTINUED | OUTPATIENT
Start: 2022-01-02 | End: 2022-01-05 | Stop reason: HOSPADM

## 2022-01-02 RX ORDER — METOPROLOL SUCCINATE 50 MG/1
100 TABLET, EXTENDED RELEASE ORAL
Status: DISCONTINUED | OUTPATIENT
Start: 2022-01-02 | End: 2022-01-05 | Stop reason: HOSPADM

## 2022-01-02 RX ORDER — 0.9 % SODIUM CHLORIDE 0.9 %
100 INTRAVENOUS SOLUTION INTRAVENOUS PRN
Status: DISCONTINUED | OUTPATIENT
Start: 2022-01-02 | End: 2022-01-05 | Stop reason: HOSPADM

## 2022-01-02 RX ORDER — LISINOPRIL 20 MG/1
20 TABLET ORAL 2 TIMES DAILY
Status: DISCONTINUED | OUTPATIENT
Start: 2022-01-02 | End: 2022-01-05 | Stop reason: HOSPADM

## 2022-01-02 RX ORDER — HYDROCODONE BITARTRATE AND ACETAMINOPHEN 5; 325 MG/1; MG/1
1 TABLET ORAL EVERY 6 HOURS PRN
Status: DISCONTINUED | OUTPATIENT
Start: 2022-01-02 | End: 2022-01-05 | Stop reason: HOSPADM

## 2022-01-02 RX ORDER — ALBUTEROL SULFATE 2.5 MG/3ML
2.5 SOLUTION RESPIRATORY (INHALATION) EVERY 6 HOURS PRN
Status: DISCONTINUED | OUTPATIENT
Start: 2022-01-02 | End: 2022-01-05 | Stop reason: HOSPADM

## 2022-01-02 RX ORDER — ALBUTEROL SULFATE 90 UG/1
2 AEROSOL, METERED RESPIRATORY (INHALATION) EVERY 6 HOURS PRN
Status: DISCONTINUED | OUTPATIENT
Start: 2022-01-02 | End: 2022-01-02 | Stop reason: CLARIF

## 2022-01-02 RX ORDER — DEXTROSE MONOHYDRATE 25 G/50ML
12.5 INJECTION, SOLUTION INTRAVENOUS PRN
Status: DISCONTINUED | OUTPATIENT
Start: 2022-01-02 | End: 2022-01-05 | Stop reason: HOSPADM

## 2022-01-02 RX ORDER — DULOXETIN HYDROCHLORIDE 20 MG/1
20 CAPSULE, DELAYED RELEASE ORAL DAILY
Status: DISCONTINUED | OUTPATIENT
Start: 2022-01-02 | End: 2022-01-05 | Stop reason: HOSPADM

## 2022-01-02 RX ORDER — INSULIN GLARGINE 100 [IU]/ML
40 INJECTION, SOLUTION SUBCUTANEOUS 2 TIMES DAILY
Status: DISCONTINUED | OUTPATIENT
Start: 2022-01-02 | End: 2022-01-02

## 2022-01-02 RX ORDER — LACTULOSE 10 G/15ML
15 SOLUTION ORAL 3 TIMES DAILY
COMMUNITY

## 2022-01-02 RX ORDER — SEVELAMER CARBONATE 800 MG/1
800 TABLET, FILM COATED ORAL
Status: DISCONTINUED | OUTPATIENT
Start: 2022-01-02 | End: 2022-01-05 | Stop reason: HOSPADM

## 2022-01-02 RX ORDER — NICOTINE POLACRILEX 4 MG
15 LOZENGE BUCCAL PRN
Status: DISCONTINUED | OUTPATIENT
Start: 2022-01-02 | End: 2022-01-05 | Stop reason: HOSPADM

## 2022-01-02 RX ORDER — ROPINIROLE 1 MG/1
1 TABLET, FILM COATED ORAL NIGHTLY
Status: DISCONTINUED | OUTPATIENT
Start: 2022-01-02 | End: 2022-01-05 | Stop reason: HOSPADM

## 2022-01-02 RX ORDER — HYDRALAZINE HYDROCHLORIDE 50 MG/1
100 TABLET, FILM COATED ORAL EVERY 8 HOURS SCHEDULED
Status: DISCONTINUED | OUTPATIENT
Start: 2022-01-02 | End: 2022-01-05 | Stop reason: HOSPADM

## 2022-01-02 RX ADMIN — HYDRALAZINE HYDROCHLORIDE 100 MG: 50 TABLET, FILM COATED ORAL at 06:12

## 2022-01-02 RX ADMIN — DULOXETINE HYDROCHLORIDE 20 MG: 20 CAPSULE, DELAYED RELEASE ORAL at 12:51

## 2022-01-02 RX ADMIN — ONDANSETRON 4 MG: 2 INJECTION INTRAMUSCULAR; INTRAVENOUS at 04:06

## 2022-01-02 RX ADMIN — SODIUM CHLORIDE, PRESERVATIVE FREE 10 ML: 5 INJECTION INTRAVENOUS at 21:52

## 2022-01-02 RX ADMIN — ISOSORBIDE MONONITRATE 120 MG: 60 TABLET, EXTENDED RELEASE ORAL at 12:52

## 2022-01-02 RX ADMIN — LACTULOSE 15.33 G: 20 SOLUTION ORAL at 21:50

## 2022-01-02 RX ADMIN — ATORVASTATIN CALCIUM 20 MG: 20 TABLET, FILM COATED ORAL at 12:52

## 2022-01-02 RX ADMIN — HYDRALAZINE HYDROCHLORIDE 100 MG: 50 TABLET, FILM COATED ORAL at 16:46

## 2022-01-02 RX ADMIN — NIFEDIPINE 90 MG: 30 TABLET, EXTENDED RELEASE ORAL at 12:52

## 2022-01-02 RX ADMIN — INSULIN LISPRO 2 UNITS: 100 INJECTION, SOLUTION INTRAVENOUS; SUBCUTANEOUS at 17:51

## 2022-01-02 RX ADMIN — SODIUM CHLORIDE, PRESERVATIVE FREE 10 ML: 5 INJECTION INTRAVENOUS at 12:53

## 2022-01-02 RX ADMIN — METOPROLOL SUCCINATE 100 MG: 50 TABLET, EXTENDED RELEASE ORAL at 06:12

## 2022-01-02 RX ADMIN — LEVOTHYROXINE SODIUM 150 MCG: 75 TABLET ORAL at 06:12

## 2022-01-02 RX ADMIN — ROPINIROLE HYDROCHLORIDE 1 MG: 1 TABLET, FILM COATED ORAL at 21:50

## 2022-01-02 RX ADMIN — HEPARIN SODIUM 5000 UNITS: 5000 INJECTION INTRAVENOUS; SUBCUTANEOUS at 06:13

## 2022-01-02 RX ADMIN — HEPARIN SODIUM 5000 UNITS: 5000 INJECTION INTRAVENOUS; SUBCUTANEOUS at 01:27

## 2022-01-02 RX ADMIN — LISINOPRIL 20 MG: 20 TABLET ORAL at 21:50

## 2022-01-02 RX ADMIN — INSULIN LISPRO 7 UNITS: 100 INJECTION, SOLUTION INTRAVENOUS; SUBCUTANEOUS at 17:45

## 2022-01-02 RX ADMIN — HYDRALAZINE HYDROCHLORIDE 100 MG: 50 TABLET, FILM COATED ORAL at 21:50

## 2022-01-02 RX ADMIN — STANDARDIZED SENNA CONCENTRATE 8.6 MG: 8.6 TABLET ORAL at 12:51

## 2022-01-02 RX ADMIN — LISINOPRIL 20 MG: 20 TABLET ORAL at 12:50

## 2022-01-02 RX ADMIN — ERGOCALCIFEROL 50000 UNITS: 1.25 CAPSULE ORAL at 06:12

## 2022-01-02 RX ADMIN — SEVELAMER CARBONATE 800 MG: 800 TABLET, FILM COATED ORAL at 17:45

## 2022-01-02 RX ADMIN — HYDRALAZINE HYDROCHLORIDE 5 MG: 20 INJECTION INTRAMUSCULAR; INTRAVENOUS at 04:06

## 2022-01-02 RX ADMIN — SEVELAMER CARBONATE 800 MG: 800 TABLET, FILM COATED ORAL at 13:52

## 2022-01-02 RX ADMIN — HYDROCODONE BITARTRATE AND ACETAMINOPHEN 1 TABLET: 5; 325 TABLET ORAL at 04:06

## 2022-01-02 RX ADMIN — LACTULOSE 15.33 G: 20 SOLUTION ORAL at 12:47

## 2022-01-02 RX ADMIN — METHYLNALTREXONE BROMIDE 6 MG: 12 INJECTION, SOLUTION SUBCUTANEOUS at 12:48

## 2022-01-02 RX ADMIN — SODIUM CHLORIDE 0.2 UNITS/KG/HR: 9 INJECTION, SOLUTION INTRAVENOUS at 05:49

## 2022-01-02 ASSESSMENT — PAIN SCALES - GENERAL
PAINLEVEL_OUTOF10: 0
PAINLEVEL_OUTOF10: 0
PAINLEVEL_OUTOF10: 10
PAINLEVEL_OUTOF10: 0

## 2022-01-02 NOTE — PROGRESS NOTES
Carlyn Brewster arrived to room # 146. Presented with: hyperglycemia  Mental Status: Patient is oriented, alert, coherent, logical, thought processes intact and able to concentrate and follow conversation. Vitals:    01/01/22 2354   BP: (!) 160/71   Pulse: 66   Resp: 15   Temp: 98.2 °F (36.8 °C)   SpO2: 94%     Patient safety contract and falls prevention contract reviewed with patient Yes. Oriented Patient to room. Call light within reach. Yes.   Needs, issues or concerns expressed at this time: no.      Electronically signed by Julito Collazo RN on 1/2/2022 at 12:05 AM

## 2022-01-02 NOTE — ED TRIAGE NOTES
Patient presents from local nursing [place] for evaluation and treatment of hyperglycemia. Patient states that staff had been consistently checking/treating her sugar all day. Finally, a lab draw demonstrated a serum glucose greater than 900.

## 2022-01-02 NOTE — ED NOTES
Bed: 05  Expected date:   Expected time:   Means of arrival:   Comments:  Damion Morris [facility]  01/01/22 2101

## 2022-01-02 NOTE — ED PROVIDER NOTES
[facility]  eMERGENCY dEPARTMENT eNCOUnter      Pt Name: Wendy Ng  MRN: [id number]  [place] 1970  Date of evaluation: 1/1/2022  Provider: Sathish Chung MD    [address] [place] [address]       Chief Complaint   Patient presents with    Hyperglycemia     Pt from [facility] in 900's. Pt supposed to have dialysis tomorrow, pt has not missed any appointments. HISTORY OF PRESENT ILLNESS   (Location/Symptom, Timing/Onset,Context/Setting, Quality, Duration, Modifying Factors, Severity)  Note limiting factors. Wendy Ng is a 45 y.o. female who presents to the emergency department with a blood sugar of 970. Her fingerstick blood sugars have been reading high all day. She was given insulin throughout the day unknown last dose. Patient denies any recent illness. No fever. She was recently on antibiotics for broken teeth. She continues to have pain but is not any different than prior. No swelling. HPI    NursingNotes were reviewed. REVIEW OF SYSTEMS    (2-9 systems for level 4, 10 or more for level 5)     Review of Systems   Constitutional: Negative for chills and fever. HENT: Positive for dental problem. Negative for rhinorrhea and sore throat. Respiratory: Negative for shortness of breath. Cardiovascular: Negative for chest pain and leg swelling. Gastrointestinal: Negative for abdominal pain, diarrhea, nausea and vomiting. Genitourinary: Negative for difficulty urinating. Musculoskeletal: Negative for back pain and neck pain. Skin: Negative for rash. Neurological: Positive for weakness. Negative for headaches. Psychiatric/Behavioral: Negative for confusion. A complete review of systems was performed and is negative except as noted above in the HPI.        PAST MEDICAL HISTORY     Past Medical History:   Diagnosis Date    Arthritis     Chronic kidney disease, stage 5, kidney failure ([address]     Closed fracture of right shoulder girdle, with routine healing, subsequent encounter     DM (diabetes mellitus) type II controlled with renal manifestation ([facility] [address] [facility] 06/1993    Gastroparesis     GERD (gastroesophageal reflux disease)     Hemodialysis patient ([facility]     dialysis on tues, thur, and sat at [facility]    Hypertension     Hypothyroid     Nasal congestion     recent    Neuropathy     Noncompliance with medications     Palliative care patient 10/08/2019    Restless leg syndrome          SURGICAL HISTORY       Past Surgical History:   Procedure Laterality Date    BREAST SURGERY Left 2008    infected milk gland removed    BREAST SURGERY Right 5/25/2021    WEDGE EXCISION RIGHT BREAST DUCT WITH LACRIMAL DUCT PROBE, PEC BLOCK performed by Disha Soni MD at [address], LAPAROSCOPIC      2008    COLONOSCOPY Left 9/17/2020    Dr Migdalia Ramey hepatic flexure-Severe tortuosity with severe spasming, 1 yr recall    DIALYSIS FISTULA CREATION Left 1/25/2019    REVISION LEFT UPPER EXTREMITY AV ACCESS WITH LEFT BRACHIAL ARTERY TO LEFT AXILLARY VEIN WITH  INTERPOSITIONAL ARTEGRAFT   performed by Ximena Carey MD at [address]  2012    [address] Right 1/25/2019    INSERTION OF RIGHT INTERNAL JUGULAR HEMODIALYSIS CATHETER performed by Ximena Carey MD at [facility] [facility]  08/17/2018    1.  Moderately increased stainable iron identified by special stain in Kupffer cells and occasional hepatocytes. Negative for evidence of significant portal or lobular inflammation.  Negative for evidence of significant fibrosis    DE COLONOSCOPY W/BIOPSY SINGLE/MULTIPLE N/A 10/20/2017    Dr Darryle Kussmaul prep-Tubular AP (-) dysplasia x 2--3 yr recall    DE EGD TRANSORAL BIOPSY SINGLE/MULTIPLE N/A 12/27/2016    Dr Evin Werner EGD TRANSORAL BIOPSY SINGLE/MULTIPLE N/A 10/20/2017    Dr Mickiel Primrose (-)   [facility] VASCULAR SURGERY Left 2016    fistula by Dr Joseph Mcgee at [address]  10/02/2017 [facility].Left upper fistulograms/venograms, balloon angioplasty cephalic vein arch 65M25 conquest.    VASCULAR SURGERY  08/2018    FISTULOGRAM    VASCULAR SURGERY  10/29/2018    [facility]. Left upper fistulograms/venograms    VASCULAR SURGERY  12/19/2018    [facility]. Arch aortogram,left upper arteriograms.  VASCULAR SURGERY  03/06/2019    [facility]. Removal of tunneled dialyis catheter right internal jugular vein.  VASCULAR SURGERY  08/28/2019    [facility]. Left upper extremity fistulogram including venography to the superior vena cava. Balloon angioplasty left subclavian vein with 10mm x 40mm conquest balloon. Balloon angioplasty mid/proximal upper arm cephalic vein with 53IT x 40mm conquest balloon. Venograms after balloon angioplasty. Stent left mid/proximal upper arm cephalic vein stenosis fluency 10mm x 60mm self-expanding covered stent. Balloon     VASCULAR SURGERY  08/28/2019    cont angioplasty stent with 10mm x 40mm conquest balloon. Completion venograms left upper extremity. CURRENT MEDICATIONS       Previous Medications    ALBUTEROL SULFATE  (90 BASE) MCG/ACT INHALER    Inhale 2 puffs into the lungs every 4 hours as needed    CLONIDINE (CATAPRES) 0.1 MG/24HR PTWK    Place 1 patch onto the skin once a week    DULOXETINE (CYMBALTA) 60 MG EXTENDED RELEASE CAPSULE    Take 60 mg by mouth daily    ERGOCALCIFEROL (VITAMIN D2) 10 MCG (400 UNIT) TABS    Take 1.25 mg by mouth every 14 days    HYDRALAZINE (APRESOLINE) 100 MG TABLET    Take 1 tablet by mouth every 8 hours    HYDROCHLOROTHIAZIDE (HYDRODIURIL) 25 MG TABLET    Take 2 tablets by mouth daily    HYDROCODONE-ACETAMINOPHEN (NORCO) 5-325 MG PER TABLET    Take 1 tablet by mouth every 6 hours as needed for Pain.     INSULIN ASPART (NOVOLOG FLEXPEN) 100 UNIT/ML INJECTION PEN    Inject 10 Units into the skin 3 times daily Indications: Diabetes    INSULIN GLARGINE (BASAGLAR KWIKPEN) 100 UNIT/ML INJECTION PEN    Inject 40 Units into the skin nightly Indications: Diabetes ISOSORBIDE MONONITRATE (IMDUR) 120 MG EXTENDED RELEASE TABLET    Take 1 tablet by mouth daily    LEVOTHYROXINE (SYNTHROID) 150 MCG TABLET    Take 150 mcg by mouth Daily    LISINOPRIL (PRINIVIL;ZESTRIL) 20 MG TABLET    Take 1 tablet by mouth 2 times daily    METOPROLOL SUCCINATE (TOPROL XL) 100 MG EXTENDED RELEASE TABLET    Take 1 tablet by mouth every morning (before breakfast)    MINOXIDIL (LONITEN) 2.5 MG TABLET    Take 1 tablet by mouth daily    NICOTINE (NICODERM CQ) 14 MG/24HR    Place 1 patch onto the skin daily    NIFEDIPINE (PROCARDIA XL) 30 MG EXTENDED RELEASE TABLET    Take 3 tablets by mouth daily    ROPINIROLE (REQUIP) 2 MG TABLET    Take 4 mg by mouth nightly Indications: Restless Leg Syndrome     SENNA (SENNA-LAX) 8.6 MG TABLET    Take 1 tablet by mouth daily    SEVELAMER (RENVELA) 800 MG TABLET    Take 1 tablet by mouth 3 times daily (with meals)    SIMVASTATIN (ZOCOR) 40 MG TABLET    Take 40 mg by mouth nightly        ALLERGIES     Penicillins, Lamisil advanced [terbinafine], Reglan [metoclopramide], and Stadol [butorphanol]    FAMILY HISTORY       Family History   Problem Relation Age of Onset    Colon Cancer Mother          at 61    Colon Polyps Mother     Lung Cancer Father          at 66    Colon Polyps Father     Stomach Cancer Sister     Breast Cancer Sister 27    Depression Daughter 25        committed suicide    Liver Cancer Neg Hx     Liver Disease Neg Hx     Esophageal Cancer Neg Hx     Rectal Cancer Neg Hx           SOCIAL HISTORY       Social History     Socioeconomic History    Marital status: Single     Spouse name: None    Number of children: 2    Years of education: None    Highest education level: None   Occupational History    None   Tobacco Use    Smoking status: Current Every Day Smoker     Packs/day: 0.50     Years: 33.00     Pack years: 16.50     Types: Cigarettes    Smokeless tobacco: Never Used    Tobacco comment: NOW at 1/ PD, started at age 24 and has averaged 2 PPD   Vaping Use    Vaping Use: Never used   Substance and Sexual Activity    Alcohol use: No    Drug use: Not Currently     Types: Marijuana [place])    Sexual activity: Not Currently     Partners: Male   Other Topics Concern    None   Social History Narrative    None     Social Determinants of Health     Financial Resource Strain:     Difficulty of Paying Living Expenses: Not on file   Food Insecurity:     Worried About Running Out of Food in the Last Year: Not on file    Kendy of Food in the Last Year: Not on file   Transportation Needs:     Lack of Transportation (Medical): Not on file    Lack of Transportation (Non-Medical): Not on file   Physical Activity:     Days of Exercise per Week: Not on file    Minutes of Exercise per Session: Not on file   Stress:     Feeling of Stress : Not on file   Social Connections:     Frequency of Communication with Friends and Family: Not on file    Frequency of Social Gatherings with Friends and Family: Not on file    Attends Denominational Services: Not on file    Active Member of [address] [place] [address] or Organizations: Not on file    Attends Club or Organization Meetings: Not on file    Marital Status: Not on file   Intimate Partner Violence:     Fear of Current or Ex-Partner: Not on file    Emotionally Abused: Not on file    Physically Abused: Not on file    Sexually Abused: Not on file   Housing Stability:     Unable to Pay for Housing in the Last Year: Not on file    Number of [place] in the Last Year: Not on file    Unstable Housing in the Last Year: Not on file       SCREENINGS             PHYSICAL EXAM    (up to 7 for level 4, 8 or more for level 5)     ED Triage Vitals [01/01/22 2108]   BP Temp Temp Source Pulse Resp SpO2 Height Weight   (!) 155/70 98.4 °F (36.9 °C) Oral 65 20 97 % 5' 6\" (1.676 m) 140 lb (63.5 kg)       Physical Exam  Vitals and nursing note reviewed. Constitutional:       General: She is not in acute distress. Appearance: She is well-developed. She is not diaphoretic. HENT:      Head: Normocephalic and atraumatic. Eyes:      Pupils: Pupils are equal, round, and reactive to light. Cardiovascular:      Rate and Rhythm: Normal rate and regular rhythm. Heart sounds: Normal heart sounds. Pulmonary:      Effort: Pulmonary effort is normal. No respiratory distress. Breath sounds: Normal breath sounds. Abdominal:      General: Bowel sounds are normal. There is no distension. Palpations: Abdomen is soft. Tenderness: There is no abdominal tenderness. Musculoskeletal:         General: Normal range of motion. Cervical back: Normal range of motion and neck supple. Skin:     General: Skin is warm and dry. Findings: No rash. Neurological:      Mental Status: She is alert and oriented to person, place, and time. Cranial Nerves: No cranial nerve deficit. Motor: No abnormal muscle tone. Coordination: Coordination normal.   Psychiatric:         Behavior: Behavior normal.         DIAGNOSTIC RESULTS     EKG: All EKG's are interpreted by the Emergency Department Physician who either signs or Co-signs this chart in the absence of a cardiologist.    Normal sinus rhythm rate 65 no acute ST wave    RADIOLOGY:   Non-plain film images such as CT, Ultrasound and MRI are read by the radiologist. Plainradiographic images are visualized and preliminarily interpreted by the emergency physician with the below findings:      Interpretation per the Radiologist below, if available at the time of this note:    XR CHEST PORTABLE   Final Result   1. Cardiomegaly with mild CHF. .   Signed by Dr Teja Mariscal            ED BEDSIDE ULTRASOUND:   Performed by ED Physician - none    LABS:  Labs Reviewed   CBC WITH AUTO DIFFERENTIAL - Abnormal; Notable for the following components:       Result Value    WBC 4.5 (*)     RBC 3.52 (*)     Hemoglobin 10.0 (*)     Hematocrit 33.2 (*)     MCHC 30.1 (*)     RDW 15.0 (*)     Platelets 402 (*)     Neutrophils % 65.2 (*)     Lymphocytes % 19.2 (*)     Basophils % 2.0 (*)     Lymphocytes Absolute 0.9 (*)     All other components within normal limits   COMPREHENSIVE METABOLIC PANEL - Abnormal; Notable for the following components:    Sodium 118 (*)     Chloride 80 (*)     Glucose 879 (*)     BUN 40 (*)     CREATININE 4.3 (*)     GFR Non- 11 (*)     GFR African American 13 (*)     Alkaline Phosphatase 118 (*)     All other components within normal limits    Narrative:     CALL  [place]  [facility] tel. ,  Chemistry results called to and read back by Danyell Creirma in ED, 01/01/2022  22:23, by [employer]   BLOOD GAS, ARTERIAL - Abnormal; Notable for the following components:    pH, Arterial 7.340 (*)     pO2, Arterial 57.0 (*)     Base Excess, Arterial -2.1 (*)     Hemoglobin, Art, Extended 9.3 (*)     All other components within normal limits   TROPONIN - Abnormal; Notable for the following components:    Troponin 0.10 (*)     All other components within normal limits    Narrative:     CALL  [place]  [facility] tel. ,  Chemistry results called to and read back by Danyell Crew in ED, 01/01/2022  22:23, by ERMELINDA   COVID-19, RAPID   CULTURE, BLOOD 1   CULTURE, BLOOD 2   APTT   LACTIC ACID, PLASMA   PROTIME-INR   POTASSIUM, WHOLE BLOOD   URINE RT REFLEX TO CULTURE   POCT GLUCOSE       All other labs were within normal range or not returned as of this dictation. EMERGENCY DEPARTMENT COURSE and DIFFERENTIALDIAGNOSIS/MDM:   Vitals:    Vitals:    01/01/22 2108 01/01/22 2229   BP: (!) 155/70 (!) 160/75   Pulse: 65 65   Resp: 20 17   Temp: 98.4 °F (36.9 °C)    TempSrc: Oral    SpO2: 97% 95%   Weight: 140 lb (63.5 kg)    Height: 5' 6\" (1.676 m)        MDM  Placed on insulin drip for severe hyperglycemia. Discussed with hospitalist for admission    CONSULTS:  None    PROCEDURES:  Unless otherwise notedbelow, none     Procedures    FINAL IMPRESSION     1.  Hyperglycemia          DISPOSITION/PLAN DISPOSITION Admitted 01/01/2022 11:10:48 PM      PATIENT REFERRED TO:  @FUP@    DISCHARGE MEDICATIONS:  New Prescriptions    No medications on file          (Please note that portions of this note were completed with a voice recognition program.  Efforts were made to edit the dictations butoccasionally words are mis-transcribed.)    Jam Wheeler MD (electronically signed)  AttendingEmergency Physician         aJm Wheeler MD  01/01/22 3668

## 2022-01-02 NOTE — PROGRESS NOTES
accucheck at present 63mg/dl. Pt given yogurt cup which is 18 gm carb and 14gm sugars. Pt continues on dialysis at present. Pt has no complaints.  Electronically signed by Gavi Chaney RN on 1/2/2022 at 11:28 AM

## 2022-01-02 NOTE — PROGRESS NOTES
accucheck 66mg/dl. Insulin gtt is off at 0737. Pt given milk, and 4 packages of gucci crackers. Dialysis in room to start today's dialysis run. Will cont to offer snacks.  Electronically signed by Kenn Anand RN on 1/2/2022 at 11:33 AM

## 2022-01-02 NOTE — PROGRESS NOTES
Hospitalist Progress Note  [facility]     Patient: Lashonda Malhotra  : 1970  MRN: [id number]  Code Status: Full Code    Hospital Day: 1   Date of Service: 2022    Subjective:   Patient seen and examined. No current complaints. Past Medical History:   Diagnosis Date    Arthritis     Chronic kidney disease, stage 5, kidney failure (HCC)     Closed fracture of right shoulder girdle, with routine healing, subsequent encounter     DM (diabetes mellitus) type II controlled with renal manifestation ([facility] 1993    Gastroparesis     GERD (gastroesophageal reflux disease)     Hemodialysis patient ([facility]     dialysis on tues, thur, and [facility]    Hypertension     Hypothyroid     Nasal congestion     recent    Neuropathy     Noncompliance with medications     Palliative care patient 10/08/2019    Restless leg syndrome        Past Surgical History:   Procedure Laterality Date    BREAST SURGERY Left     infected milk gland removed    BREAST SURGERY Right 2021    WEDGE EXCISION RIGHT BREAST DUCT WITH LACRIMAL DUCT PROBE, PEC BLOCK performed by Domenico Nassar MD at [address] [facility]          COLONOSCOPY Left 2020    Dr Daniella Armenta hepatic flexure-Severe tortuosity with severe spasming, 1 yr recall    DIALYSIS FISTULA CREATION Left 2019    REVISION LEFT UPPER EXTREMITY AV ACCESS WITH LEFT BRACHIAL ARTERY TO LEFT AXILLARY VEIN WITH  INTERPOSITIONAL ARTEGRAFT   performed by Marisel Cardenas MD at [address]  2012    [address] Right 2019    INSERTION OF RIGHT INTERNAL JUGULAR HEMODIALYSIS CATHETER performed by Marisel Cardenas MD at [address] [facility]  2018    1.  Moderately increased stainable iron identified by special stain in Kupffer cells and occasional hepatocytes. Negative for evidence of significant portal or lobular inflammation.  Negative for evidence of significant fibrosis    AL COLONOSCOPY W/BIOPSY SINGLE/MULTIPLE N/A 10/20/2017    Dr Mae Maddox prep-Tubular AP (-) dysplasia x 2--3 yr recall    AL EGD TRANSORAL BIOPSY SINGLE/MULTIPLE N/A 2016    Dr Amezquita Section EGD TRANSORAL BIOPSY SINGLE/MULTIPLE N/A 10/20/2017    Dr Elba Major (-)    TUBAL LIGATION          VASCULAR SURGERY Left 2016    fistula by Dr Matthew Porras at [address]  10/02/2017    [facility]. Left upper fistulograms/venograms, balloon angioplasty cephalic vein arch [id number] conquest.    VASCULAR SURGERY  2018    FISTULOGRAM    VASCULAR SURGERY  10/29/2018    [facility]. Left upper fistulograms/venograms    VASCULAR SURGERY  2018    [facility]. Arch aortogram,left upper arteriograms.  VASCULAR SURGERY  2019    [facility]. Removal of tunneled dialyis catheter right internal jugular vein.  VASCULAR SURGERY  2019    [facility]. Left upper extremity fistulogram including venography to the superior vena cava. Balloon angioplasty left subclavian vein with 10mm x 40mm conquest balloon. Balloon angioplasty mid/proximal upper arm cephalic vein with 11FV x 40mm conquest balloon. Venograms after balloon angioplasty. Stent left mid/proximal upper arm cephalic vein stenosis fluency 10mm x 60mm self-expanding covered stent. Balloon     VASCULAR SURGERY  2019    cont angioplasty stent with 10mm x 40mm conquest balloon. Completion venograms left upper extremity.        Family History   Problem Relation Age of Onset    Colon Cancer Mother          at 63    Colon Polyps Mother     Lung Cancer Father          at 79    Colon Polyps Father     Stomach Cancer Sister     Breast Cancer Sister 27    Depression Daughter 25        committed suicide    Liver Cancer Neg Hx     Liver Disease Neg Hx     Esophageal Cancer Neg Hx     Rectal Cancer Neg Hx        Social History     Socioeconomic History    Marital status: Single     Spouse name: Not on file    Number of children: 2    Years of education: Not on file    Highest education level: Not on file   Occupational History    Not on file   Tobacco Use    Smoking status: Current Every Day Smoker     Packs/day: 0.50     Years: 33.00     Pack years: 16.50     Types: Cigarettes    Smokeless tobacco: Never Used    Tobacco comment: NOW at 1/4 PD, started at age 24 and has averaged 2 PPD   Vaping Use    Vaping Use: Never used   Substance and Sexual Activity    Alcohol use: No    Drug use: Not Currently     Types: Marijuana [place])    Sexual activity: Not Currently     Partners: Male   Other Topics Concern    Not on file   Social History Narrative    Not on file     Social Determinants of Health     Financial Resource Strain:     Difficulty of Paying Living Expenses: Not on file   Food Insecurity:     Worried About Running Out of Food in the Last Year: Not on file    Kendy of Food in the Last Year: Not on file   Transportation Needs:     Lack of Transportation (Medical): Not on file    Lack of Transportation (Non-Medical):  Not on file   Physical Activity:     Days of Exercise per Week: Not on file    Minutes of Exercise per Session: Not on file   Stress:     Feeling of Stress : Not on file   Social Connections:     Frequency of Communication with Friends and Family: Not on file    Frequency of Social Gatherings with Friends and Family: Not on file    Attends Yarsani Services: Not on file    Active Member of [address] [place] [address] or Organizations: Not on file    Attends Club or Organization Meetings: Not on file    Marital Status: Not on file   Intimate Partner Violence:     Fear of Current or Ex-Partner: Not on file    Emotionally Abused: Not on file    Physically Abused: Not on file    Sexually Abused: Not on file   Housing Stability:     Unable to Pay for Housing in the Last Year: Not on file    Number of [place] in the Last Year: Not on file    Unstable Housing in the Last Year: Not on file       Current Facility-Administered Medications Medication Dose Route Frequency Provider Last Rate Last Admin    cloNIDine (CATAPRES) 0.1 MG/24HR 1 patch  1 patch TransDERmal Weekly Sadaf Perez MD        hydrALAZINE (APRESOLINE) tablet 100 mg  100 mg Oral 3 times per day Sadaf Perez MD   100 mg at 01/02/22 0612    HYDROcodone-acetaminophen ([facility]) 5-325 MG per tablet 1 tablet  1 tablet Oral Q6H PRN Sadaf Perez MD   1 tablet at 01/02/22 0406    insulin lispro (HUMALOG) injection vial 14 Units  14 Units SubCUTAneous TID Sadaf Perez MD        insulin glargine (LANTUS) injection vial 40 Units  40 Units SubCUTAneous BID Sadaf Perez MD        isosorbide mononitrate (IMDUR) extended release tablet 120 mg  120 mg Oral Daily Sadaf Perez MD        lactulose [facility]) 10 GM/15ML solution 15.3333 g  15.3333 g Oral BID Sadaf Perez MD        levothyroxine (SYNTHROID) tablet 150 mcg  150 mcg Oral Daily Sadaf Perez MD   150 mcg at 01/02/22 8999    metoprolol succinate (TOPROL XL) extended release tablet 100 mg  100 mg Oral QAM AC Sadaf Perez MD   100 mg at 01/02/22 0612    rOPINIRole (REQUIP) tablet 1 mg  1 mg Oral Nightly Sadaf Perez MD        [facility]) tablet 8.6 mg  1 tablet Oral Daily Sadaf Perez MD        sevelamer (RENVELA) tablet 800 mg  800 mg Oral TID  Sadaf Perez MD        atorvastatin (LIPITOR) tablet 20 mg  20 mg Oral Daily Sadaf Perez MD        DULoxetine (CYMBALTA) extended release capsule 20 mg  20 mg Oral Daily Sadaf Perez MD        insulin lispro (HUMALOG) injection vial 0-12 Units  0-12 Units SubCUTAneous TID  Sadaf Perez MD        insulin lispro (HUMALOG) injection vial 0-6 Units  0-6 Units SubCUTAneous Nightly Sadaf Perez MD        glucose (GLUTOSE) 40 % oral gel 15 g  15 g Oral PRN Sadaf Perez MD        dextrose 50 % IV solution  12.5 g IntraVENous PRN Cirilo Mckeon MD        glucagon (rDNA) injection 1 mg  1 mg IntraMUSCular PRN Cirilo Mckeon MD        dextrose 5 % solution  100 mL/hr IntraVENous PRN Cirilo Mckeon MD        methylnaltrexone (RELISTOR) injection 6 mg  6 mg SubCUTAneous Once Cirilo Mckeon MD        hydrALAZINE (APRESOLINE) injection 5 mg  5 mg IntraVENous Q4H PRN Cirilo Mckeon MD   5 mg at 01/02/22 0406    albuterol (PROVENTIL) nebulizer solution 2.5 mg  2.5 mg Nebulization Q6H PRN Cirilo Mckeon MD        vitamin D (ERGOCALCIFEROL) capsule 50,000 Units  50,000 Units Oral Weekly Cirilo Mckeon MD   50,000 Units at 01/02/22 0612    lisinopril (PRINIVIL;ZESTRIL) tablet 20 mg  20 mg Oral BID Cirilo Mckeon MD        NIFEdipine (PROCARDIA XL) extended release tablet 90 mg  90 mg Oral Daily Cirilo Mckeon MD        0.9 % sodium chloride bolus  100 mL IntraVENous PRN Jani Galicia MD        insulin regular (HUMULIN R;NOVOLIN R) 100 Units in sodium chloride 0.9 % 100 mL infusion  0.1 Units/kg/hr IntraVENous Continuous Nella Larose MD   Stopped at 01/02/22 0737    sodium chloride flush 0.9 % injection 5-40 mL  5-40 mL IntraVENous 2 times per day Cirilo Mckeon MD        sodium chloride flush 0.9 % injection 5-40 mL  5-40 mL IntraVENous PRN Cirilo Mckeon MD        0.9 % sodium chloride infusion  25 mL IntraVENous PRN Cirilo Mckeon MD        heparin (porcine) injection 5,000 Units  5,000 Units SubCUTAneous 3 times per day Cirilo Mckeon MD   5,000 Units at 01/02/22 0621    ondansetron (ZOFRAN-ODT) disintegrating tablet 4 mg  4 mg Oral Q8H PRN Cirilo Mckeon MD        Or    ondansetron [facility]) injection 4 mg  4 mg IntraVENous Q6H PRN Cirilo Mckeon MD   4 mg at 01/02/22 0406    polyethylene glycol (GLYCOLAX) packet 17 g  17 g Oral Daily PRN Jenice Goldberg Gabriela Simpson MD        acetaminophen (TYLENOL) tablet 650 mg  650 mg Oral Q6H PRN Suraj Carl MD        Or    acetaminophen (TYLENOL) suppository 650 mg  650 mg Rectal Q6H PRN Suraj Carl MD             dextrose      insulin Stopped (01/02/22 0737)    sodium chloride          Objective:   BP (!) 153/76   Pulse 68   Temp 98.8 °F (37.1 °C) (Temporal)   Resp 18   Ht 5' 6\" (1.676 m)   Wt 178 lb 5.6 oz (80.9 kg)   SpO2 92%   BMI 28.79 kg/m²     General: no acute distress  HEENT: normocephalic, atraumatic  Neck: supple, symmetrical, trachea midline   Lungs: clear to auscultation bilaterally   Cardiovascular: s1 and s2 normal  Abdomen: soft, positive bowel sounds, nondistended, nontender  Extremities: no edema or cyanosis   Neuro: aaox3, no focal deficits   Skin: normal color and texture      Recent Labs     01/01/22 2130 01/02/22 0031 01/02/22  0209   WBC 4.5* 4.6* 4.7*   RBC 3.52* 3.10* 3.12*   HGB 10.0* 9.0* 8.9*   HCT 33.2* 28.6* 28.4*   MCV 94.3 92.3 91.0   MCH 28.4 29.0 28.5   MCHC 30.1* 31.5* 31.3*   * 88* 84*     Recent Labs     01/01/22 2130 01/01/22 2130 01/01/22 2221 01/02/22 0031 01/02/22  0209 01/02/22  0651   *  --   --  120*  --  126*   K 4.8  --  4.6 4.0  --  3.6   ANIONGAP 15  --   --  15  --  17   CL 80*  --   --  83*  --  88*   CO2 23  --   --  22  --  21*   BUN 40*  --   --  41*  --  41*   CREATININE 4.3*  --   --  4.5*  --  4.7*   GLUCOSE 879*   < >  --  745* 607* 122*   CALCIUM 8.7  --   --  8.1*  --  8.3*    < > = values in this interval not displayed. Recent Labs     01/02/22  0031 01/02/22  0209   MG  --  2.3   PHOS 4.8*  --      Recent Labs     01/01/22 2130   AST 20   ALT 15   BILITOT 0.6   ALKPHOS 118*     No results for input(s): PH, PO2, PCO2, HCO3, BE, O2SAT in the last 72 hours.   Recent Labs     01/01/22 2130 01/02/22  0031 01/02/22  0651   TROPONINI 0.10* 0.09* 0.09*     Recent Labs     01/01/22 2130   INR 0.96     Recent Labs 01/01/22 2130   LACTA 0.8       No intake or output data in the 24 hours ending 01/02/22 1039    XR CHEST PORTABLE    Result Date: 1/1/2022  XR CHEST PORTABLE 1/1/2022 9:31 PM HISTORY: Shortness of air COMPARISON: 12/14/2021 CXR: A single frontal view of the chest is performed. Findings: Stable cardiomegaly. Persistent prominence of pulmonary vascular markings as well as interstitial densities. No pleural effusion or pneumothorax identified. Left subclavian vascular stent. No acute regional bony pathology. 1. Cardiomegaly with mild CHF. . Signed by Dr Vanessa Espana and Plan:   HHS  Insulin gtt  Transition to SC insulin once able  IVF  Follow Accu-Cheks/electrolytes  History of DM2     Medical noncompliance  Counseled     ESRD on HD  Renal following     Tobacco dependence  Counseled     DVT prophylaxis  SCDs    Total critical care time: 45 minutes    Gina Us MD   1/2/2022 10:39 AM

## 2022-01-02 NOTE — PROGRESS NOTES
Accucheck \"high\".  Insulin drip resumed at 0.1units/kg/h 6.4ml/hr Electronically signed by Pradeep Mullen RN on 1/2/2022 at 12:07 AM

## 2022-01-02 NOTE — PROGRESS NOTES
4 Eyes Skin Assessment    Travis Hernández is being assessed upon: Admission    I agree that Genesis Rodríguez, RN, along with Cecile Kennedy RN (either 2 RN's or 1 LPN and 1 RN) have performed a thorough Head to Toe Skin Assessment on the patient. ALL assessment sites listed below have been assessed. Areas assessed by both nurses:     [x]   Head, Face, and Ears   [x]   Shoulders, Back, and Chest  [x]   Arms, Elbows, and Hands   [x]   Coccyx, Sacrum, and Ischium  [x]   Legs, Feet, and Heels    Does the Patient have Skin Breakdown?  No    Marek Prevention initiated: NA  Wound Care Orders initiated: NA    [facility] nurse consulted for Pressure Injury (Stage 3,4, Unstageable, DTI, NWPT, and Complex wounds) and New or Established Ostomies: NA        Primary Nurse eSignature: Vitor Brown RN on 1/2/2022 at 12:07 AM      Co-Signer eSignature: Electronically signed by Lynne Pagan RN on 1/2/22 at 12:19 AM CST

## 2022-01-02 NOTE — H&P
History and Physical    Patient Name:  Maddie Pack    :  1970    Chief Complaint:   fatigue and thirst     History of Present Illness:   Maddie Pack presents to [facility] with several days of severe fatigue and thirst. Pt lost her appetite. She was discharged from here on  after being admitted or hyperosmolar hyperglycemic state. She denies headache, dizziness, blurry vision, N/V, abd pain,diarrhea. Complains of constipation. She is opioids. Denies dysuria, still has some urine output, pt has ESRD on HD. Denies chest pain, dyspnea, palpitations, calf pain, peripheral edema. Denies fever or chills. On admission her glucose was 879. She was started on insulin gtt in ER and admitted to ED. Past Medical History:   has a past medical history of Arthritis, Chronic kidney disease, stage 5, kidney failure ([address] Closed fracture of right shoulder girdle, with routine healing, subsequent encounter, DM (diabetes mellitus) type II controlled with renal manifestation ([address] Gastroparesis, GERD (gastroesophageal reflux disease), Hemodialysis patient ([address] Hypertension, Hypothyroid, Nasal congestion, Neuropathy, Noncompliance with medications, Palliative care patient, and Restless leg syndrome. Surgical History:   has a past surgical history that includes Tubal ligation; pr egd transoral biopsy single/multiple (N/A, 2016); Endometrial ablation (); pr colonoscopy w/biopsy single/multiple (N/A, 10/20/2017); pr egd transoral biopsy single/multiple (N/A, 10/20/2017); Dialysis fistula creation (Left, 2019); hc dialysis catheter (Right, 2019); Cholecystectomy, laparoscopic; liver biopsy (2018); vascular surgery (Left, ); vascular surgery (10/02/2017); vascular surgery (2018); vascular surgery (10/29/2018); vascular surgery (2018); vascular surgery (2019); vascular surgery (2019); vascular surgery (2019); Colonoscopy (Left, 2020);  Breast surgery (Left, 2008); and Breast surgery (Right, 5/25/2021). Social History:   reports that she has been smoking cigarettes. She has a 16.50 pack-year smoking history. She has never used smokeless tobacco. She reports previous drug use. Drug: Marijuana [place]). She reports that she does not drink alcohol. Family History:  family history includes Breast Cancer (age of onset: 27) in her sister; Colon Cancer in her mother; Colon Polyps in her father and mother; Depression (age of onset: 25) in her daughter; Breana Gordon in her father; Stomach Cancer in her sister. Medications:  Prior to Admission medications    Medication Sig Start Date End Date Taking? Authorizing Provider   cefUROXime (CEFTIN) 250 MG tablet Take 250 mg by mouth daily 12/29/21 1/3/22 Yes Historical Provider, MD   lactulose (CHRONULAC) 10 GM/15ML solution Take 15 g by mouth 3 times daily With meals for constipation, hold for loose stools   Yes Historical Provider, MD   cloNIDine (CATAPRES) 0.1 MG/24HR PTWK Place 1 patch onto the skin once a week 12/28/21   ROMINA Reddy - CNP   nicotine (NICODERM CQ) 14 MG/24HR Place 1 patch onto the skin daily 12/23/21   ROMINA Reddy - CNP   HYDROcodone-acetaminophen (NORCO) 5-325 MG per tablet Take 1 tablet by mouth every 6 hours as needed for Pain.  12/22/21 12/22/22  Perez Ball MD   minoxidil (LONITEN) 2.5 MG tablet Take 1 tablet by mouth daily 12/19/21   ROMINA Reddy - CNP   isosorbide mononitrate (IMDUR) 120 MG extended release tablet Take 1 tablet by mouth daily 11/26/21   ROMINA Reddy - CNP   hydrALAZINE (APRESOLINE) 100 MG tablet Take 1 tablet by mouth every 8 hours 11/25/21   ROMINA Reddy - CNP   metoprolol succinate (TOPROL XL) 100 MG extended release tablet Take 1 tablet by mouth every morning (before breakfast)  Patient taking differently: Take 150 mg by mouth every morning (before breakfast)  11/26/21   ROMINA Reddy - CNP   NIFEdipine (PROCARDIA XL) 30 MG extended release tablet Take 3 tablets by mouth daily 11/26/21   ROMINA Sales CNP   insulin glargine (BASAGLAR KWIKPEN) 100 UNIT/ML injection pen Inject 70 Units into the skin nightly Indications: Diabetes     Historical Provider, MD   insulin aspart (NOVOLOG FLEXPEN) 100 UNIT/ML injection pen Inject 10 Units into the skin 3 times daily Indications: Diabetes  Patient taking differently: Inject 14 Units into the skin 3 times daily Indications: Diabetes  5/16/21   Perfecto Shine MD   Ergocalciferol (VITAMIN D2) 10 MCG (400 UNIT) TABS Take 1.25 mg by mouth every 14 days    Historical Provider, MD   senna (SENNA-LAX) 8.6 MG tablet Take 1 tablet by mouth daily    Historical Provider, MD   sevelamer (RENVELA) 800 MG tablet Take 1 tablet by mouth 3 times daily (with meals)    Historical Provider, MD   lisinopril (PRINIVIL;ZESTRIL) 20 MG tablet Take 1 tablet by mouth 2 times daily 4/23/19   Eileen Schilder Ward,    albuterol sulfate  (90 Base) MCG/ACT inhaler Inhale 2 puffs into the lungs every 4 hours as needed    Historical Provider, MD   levothyroxine (SYNTHROID) 150 MCG tablet Take 150 mcg by mouth Daily    Historical Provider, MD   DULoxetine (CYMBALTA) 60 MG extended release capsule Take 60 mg by mouth daily    Historical Provider, MD   rOPINIRole (REQUIP) 2 MG tablet Take 4 mg by mouth nightly Indications: Restless Leg Syndrome     Historical Provider, MD   simvastatin (ZOCOR) 40 MG tablet Take 40 mg by mouth nightly     Historical Provider, MD       Allergies:  Penicillins, Lamisil advanced [terbinafine], Reglan [metoclopramide], and Stadol [butorphanol]     Review of Systems:   · Constitutional: there has been no unanticipated weight loss. There's been change in energy level, activity level. · Eyes: No visual changes or diplopia. No scleral icterus. · ENT: No Headaches, hearing loss or vertigo. No mouth sores or sore throat.   · Cardiovascular: No chest pain, palpitations or loss of consciousness. No pleuritic pain or phlebitis. No orthopnea, PND or peripheral edema. · Respiratory: No cough or wheezing, no sputum production. No hemoptysis. No dyspnea     · Gastrointestinal: No abdominal pain, appetite loss, blood in stools. No N/V. No blood per rectum. · Genitourinary: No dysuria, ESRD  · Musculoskeletal:  No new gait disturbance, weakness or joint complaints. · Integumentary: No rash or pruritis. · Neurological: No headache, diplopia, change in muscle strength, numbness or tingling. · Psychiatric: No anxiety, or depression. · Endocrine: No temperature intolerance. Has had excessive thirst. No tremor. · Hematologic/Lymphatic: No abnormal bruising or bleeding, blood clots or swollen lymph nodes. · Allergic/Immunologic: No nasal congestion or hives. Physical Examination:    Vital Signs: BP (!) 154/73   Pulse 67   Temp 98.2 °F (36.8 °C) (Temporal)   Resp 17   Ht 5' 6\" (1.676 m)   Wt 178 lb 5.6 oz (80.9 kg)   SpO2 94%   BMI 28.79 kg/m²   General appearance: Well preserved, mesomorphic body habitus, alert, moderate distress. Skin: Skin color, texture, turgor normal. No rashes or lesions. No induration or tightening palpated. Head: Normocephalic. No masses, lesions, tenderness or abnormalities  Eyes: conjunctivae/corneas clear. EOM's intact. Sclera non icteric. Ears: External ears normal.  Hearing normal to finger rub. Nose/Sinuses: Nares normal. Septum midline. Mucosa normal. No drainage or sinus tenderness. Oropharynx: Lips, mucosa, and tongue normal. Oropharynx clear with no exudate seen. Neck: Neck supple, and symmetric. No adenopathy. Trachea is midline. Carotids brisk in upstroke without bruits, No abnormal JVP noted at 45°. Lungs: Lungs clear to auscultation bilaterally. No retractions or use of accessory muscles. No vocal fremitus. No ronchi, crackle or rale. Heart:  S1 > S2. Regular rate and rhythm.  No gallop, murmur, rub, palpable thrill or heave noted. Abdomen: Abdomen soft, non-tender. BS normal. No masses, organomegaly. No hernia noted. Extremities: Extremities normal. No deformities, edema, or skin discoloration. No cyanosis or clubbing noted to the nails. Peripheral pulses 4/4. Musculoskeletal: Spine ROM normal. Muscular strength intact. Neuro: Cranial nerves intact. Motor: Strength 5/5 in all extremities. No focal weakness. Sensory: grossly normal to touch. Pertinent Labs:  CBC:   Recent Labs     01/01/22 2130 01/02/22 0031 01/02/22  0209   WBC 4.5* 4.6* 4.7*   RBC 3.52* 3.10* 3.12*   HGB 10.0* 9.0* 8.9*   HCT 33.2* 28.6* 28.4*   MCV 94.3 92.3 91.0   MCH 28.4 29.0 28.5   MCHC 30.1* 31.5* 31.3*   RDW 15.0* 14.6* 14.4   * 88* 84*   MPV 10.3 9.5 9.8     BMP:   Recent Labs     01/01/22 2130 01/01/22 2221 01/02/22 0031 01/02/22  0209   *  --  120*  --    K 4.8 4.6 4.0  --    CL 80*  --  83*  --    CO2 23  --  22  --    BUN 40*  --  41*  --    CREATININE 4.3*  --  4.5*  --    GLUCOSE 879*  --  745* 607*   CALCIUM 8.7  --  8.1*  --      ABGs: No results for input(s): PO2, PCO2, PH, HCO3, BE, O2SAT in the last 72 hours.   INR:   Recent Labs     01/01/22 2130   INR 0.96   PROTIME 13.0     BNP:  No results found for: BNP  TSH:   Lab Results   Component Value Date    TSH 17.110 (H) 10/08/2019     Cardiac Injury Profile:   Lab Results   Component Value Date    CKTOTAL 39 04/19/2019    TROPONINI 0.09 (H) 01/02/2022     Lipid Profile: No components found for: CHLPL  Lab Results   Component Value Date    TRIG 39 03/06/2018    TRIG 91 (L) 05/18/2017     Lab Results   Component Value Date    HDL 92 03/06/2018    HDL 48 (L) 05/18/2017     Lab Results   Component Value Date    LDLCALC 59 03/06/2018    [address] 94 05/18/2017     No results found for: LABVLDL  Hemoglobin A1C:   Lab Results   Component Value Date    LABA1C 13.1 (H) 12/14/2021     No results found for: EAG      Assessment/Plan:  · Severe hyperglycemia due to uncontrolled DM2- continue insulin gtt for now. Start lantus BID in am with short acting insulin with each meal   · Hyponatremia 2/2 to hyperglycemia   · ESRD- per nephrology   · Elevated troponin-with ESRD- monitor   · Vit d def- replace   · Anemia of CKD  Patient Active Problem List:     Acquired hypothyroidism     Chronic constipation- opioid related- dose of relistor      Accelerated hypertension     Noncompliance with medications     Palliative care patient     Poor appetite     Personal history of colonic polyps     Generalized weakness- PT/OT              I have reviewed my findings and recommendations in detail with Ed Lozoya MD

## 2022-01-03 LAB
ALBUMIN SERPL-MCNC: 3.2 G/DL (ref 3.5–5.2)
ALP BLD-CCNC: 84 U/L (ref 35–104)
ALT SERPL-CCNC: 10 U/L (ref 5–33)
ANION GAP SERPL CALCULATED.3IONS-SCNC: 13 MMOL/L (ref 7–19)
AST SERPL-CCNC: 14 U/L (ref 5–32)
BASOPHILS ABSOLUTE: 0.1 K/UL (ref 0–0.2)
BASOPHILS RELATIVE PERCENT: 1.9 % (ref 0–1)
BILIRUB SERPL-MCNC: 0.3 MG/DL (ref 0.2–1.2)
BUN BLDV-MCNC: 26 MG/DL (ref 6–20)
CALCIUM SERPL-MCNC: 8.2 MG/DL (ref 8.6–10)
CHLORIDE BLD-SCNC: 94 MMOL/L (ref 98–111)
CO2: 25 MMOL/L (ref 22–29)
CREAT SERPL-MCNC: 3.6 MG/DL (ref 0.5–0.9)
EKG P AXIS: 78 DEGREES
EKG P-R INTERVAL: 192 MS
EKG Q-T INTERVAL: 464 MS
EKG QRS DURATION: 100 MS
EKG QTC CALCULATION (BAZETT): 471 MS
EKG T AXIS: 123 DEGREES
EOSINOPHILS ABSOLUTE: 0.2 K/UL (ref 0–0.6)
EOSINOPHILS RELATIVE PERCENT: 5.1 % (ref 0–5)
GFR AFRICAN AMERICAN: 16
GFR NON-AFRICAN AMERICAN: 13
GLUCOSE BLD-MCNC: 114 MG/DL (ref 70–99)
GLUCOSE BLD-MCNC: 206 MG/DL (ref 70–99)
GLUCOSE BLD-MCNC: 214 MG/DL (ref 70–99)
GLUCOSE BLD-MCNC: 226 MG/DL (ref 74–109)
GLUCOSE BLD-MCNC: 281 MG/DL (ref 70–99)
GLUCOSE BLD-MCNC: 292 MG/DL (ref 70–99)
GLUCOSE BLD-MCNC: 60 MG/DL (ref 70–99)
GLUCOSE BLD-MCNC: 62 MG/DL (ref 70–99)
GLUCOSE BLD-MCNC: 78 MG/DL (ref 70–99)
HCT VFR BLD CALC: 29 % (ref 37–47)
HEMOGLOBIN: 9.1 G/DL (ref 12–16)
IMMATURE GRANULOCYTES #: 0 K/UL
LYMPHOCYTES ABSOLUTE: 1.3 K/UL (ref 1.1–4.5)
LYMPHOCYTES RELATIVE PERCENT: 27.4 % (ref 20–40)
MAGNESIUM: 2.3 MG/DL (ref 1.6–2.6)
MCH RBC QN AUTO: 28.1 PG (ref 27–31)
MCHC RBC AUTO-ENTMCNC: 31.4 G/DL (ref 33–37)
MCV RBC AUTO: 89.5 FL (ref 81–99)
MONOCYTES ABSOLUTE: 0.4 K/UL (ref 0–0.9)
MONOCYTES RELATIVE PERCENT: 8.6 % (ref 0–10)
NEUTROPHILS ABSOLUTE: 2.7 K/UL (ref 1.5–7.5)
NEUTROPHILS RELATIVE PERCENT: 56.8 % (ref 50–65)
PDW BLD-RTO: 14.7 % (ref 11.5–14.5)
PERFORMED ON: ABNORMAL
PERFORMED ON: NORMAL
PLATELET # BLD: 106 K/UL (ref 130–400)
PMV BLD AUTO: 10 FL (ref 9.4–12.3)
POTASSIUM SERPL-SCNC: 4.3 MMOL/L (ref 3.5–5)
RBC # BLD: 3.24 M/UL (ref 4.2–5.4)
SODIUM BLD-SCNC: 132 MMOL/L (ref 136–145)
TOTAL PROTEIN: 5.5 G/DL (ref 6.6–8.7)
WBC # BLD: 4.8 K/UL (ref 4.8–10.8)

## 2022-01-03 PROCEDURE — 93010 ELECTROCARDIOGRAM REPORT: CPT | Performed by: INTERNAL MEDICINE

## 2022-01-03 PROCEDURE — 83735 ASSAY OF MAGNESIUM: CPT

## 2022-01-03 PROCEDURE — 6370000000 HC RX 637 (ALT 250 FOR IP): Performed by: HOSPITALIST

## 2022-01-03 PROCEDURE — 80053 COMPREHEN METABOLIC PANEL: CPT

## 2022-01-03 PROCEDURE — 97161 PT EVAL LOW COMPLEX 20 MIN: CPT

## 2022-01-03 PROCEDURE — 97116 GAIT TRAINING THERAPY: CPT

## 2022-01-03 PROCEDURE — 97535 SELF CARE MNGMENT TRAINING: CPT

## 2022-01-03 PROCEDURE — 82947 ASSAY GLUCOSE BLOOD QUANT: CPT

## 2022-01-03 PROCEDURE — 1210000000 HC MED SURG R&B

## 2022-01-03 PROCEDURE — 97165 OT EVAL LOW COMPLEX 30 MIN: CPT

## 2022-01-03 PROCEDURE — 6370000000 HC RX 637 (ALT 250 FOR IP): Performed by: INTERNAL MEDICINE

## 2022-01-03 PROCEDURE — 85025 COMPLETE CBC W/AUTO DIFF WBC: CPT

## 2022-01-03 PROCEDURE — 2580000003 HC RX 258: Performed by: HOSPITALIST

## 2022-01-03 PROCEDURE — 36415 COLL VENOUS BLD VENIPUNCTURE: CPT

## 2022-01-03 RX ORDER — INSULIN GLARGINE 100 [IU]/ML
25 INJECTION, SOLUTION SUBCUTANEOUS 2 TIMES DAILY
Status: DISCONTINUED | OUTPATIENT
Start: 2022-01-03 | End: 2022-01-04

## 2022-01-03 RX ADMIN — HYDRALAZINE HYDROCHLORIDE 100 MG: 50 TABLET, FILM COATED ORAL at 21:44

## 2022-01-03 RX ADMIN — INSULIN LISPRO 7 UNITS: 100 INJECTION, SOLUTION INTRAVENOUS; SUBCUTANEOUS at 10:16

## 2022-01-03 RX ADMIN — LISINOPRIL 20 MG: 20 TABLET ORAL at 21:44

## 2022-01-03 RX ADMIN — LACTULOSE 15.33 G: 20 SOLUTION ORAL at 10:44

## 2022-01-03 RX ADMIN — SODIUM CHLORIDE, PRESERVATIVE FREE 10 ML: 5 INJECTION INTRAVENOUS at 21:43

## 2022-01-03 RX ADMIN — SEVELAMER CARBONATE 800 MG: 800 TABLET, FILM COATED ORAL at 18:01

## 2022-01-03 RX ADMIN — DULOXETINE HYDROCHLORIDE 20 MG: 20 CAPSULE, DELAYED RELEASE ORAL at 10:45

## 2022-01-03 RX ADMIN — SEVELAMER CARBONATE 800 MG: 800 TABLET, FILM COATED ORAL at 10:45

## 2022-01-03 RX ADMIN — LISINOPRIL 20 MG: 20 TABLET ORAL at 10:45

## 2022-01-03 RX ADMIN — ATORVASTATIN CALCIUM 20 MG: 20 TABLET, FILM COATED ORAL at 10:45

## 2022-01-03 RX ADMIN — INSULIN LISPRO 4 UNITS: 100 INJECTION, SOLUTION INTRAVENOUS; SUBCUTANEOUS at 13:30

## 2022-01-03 RX ADMIN — HYDRALAZINE HYDROCHLORIDE 100 MG: 50 TABLET, FILM COATED ORAL at 13:30

## 2022-01-03 RX ADMIN — INSULIN LISPRO 6 UNITS: 100 INJECTION, SOLUTION INTRAVENOUS; SUBCUTANEOUS at 10:15

## 2022-01-03 RX ADMIN — SEVELAMER CARBONATE 800 MG: 800 TABLET, FILM COATED ORAL at 13:35

## 2022-01-03 RX ADMIN — NIFEDIPINE 90 MG: 30 TABLET, EXTENDED RELEASE ORAL at 10:45

## 2022-01-03 RX ADMIN — SODIUM CHLORIDE, PRESERVATIVE FREE 10 ML: 5 INJECTION INTRAVENOUS at 10:46

## 2022-01-03 RX ADMIN — INSULIN LISPRO 7 UNITS: 100 INJECTION, SOLUTION INTRAVENOUS; SUBCUTANEOUS at 13:30

## 2022-01-03 RX ADMIN — INSULIN GLARGINE 25 UNITS: 100 INJECTION, SOLUTION SUBCUTANEOUS at 10:15

## 2022-01-03 RX ADMIN — ROPINIROLE HYDROCHLORIDE 1 MG: 1 TABLET, FILM COATED ORAL at 21:44

## 2022-01-03 RX ADMIN — STANDARDIZED SENNA CONCENTRATE 8.6 MG: 8.6 TABLET ORAL at 10:45

## 2022-01-03 RX ADMIN — METOPROLOL SUCCINATE 100 MG: 50 TABLET, EXTENDED RELEASE ORAL at 06:14

## 2022-01-03 RX ADMIN — ISOSORBIDE MONONITRATE 120 MG: 60 TABLET, EXTENDED RELEASE ORAL at 10:45

## 2022-01-03 RX ADMIN — LEVOTHYROXINE SODIUM 150 MCG: 75 TABLET ORAL at 06:14

## 2022-01-03 RX ADMIN — LACTULOSE 15.33 G: 20 SOLUTION ORAL at 21:45

## 2022-01-03 RX ADMIN — HYDRALAZINE HYDROCHLORIDE 100 MG: 50 TABLET, FILM COATED ORAL at 06:14

## 2022-01-03 ASSESSMENT — PAIN SCALES - GENERAL: PAINLEVEL_OUTOF10: 0

## 2022-01-03 NOTE — CONSULTS
**Physician Signature**  This document was electronically signed by: Boston Burdick DO  2022 10:31   PM    **Consult Cover Page**  FROM: Davinhina Ocasioi [address], [phone]  Call Back Number: [phone]  SUBJECT: Consult Recommendations  Date and Time of Report: 2022 10:31 PM CT  Items Contained in this Document: Critical Care [facility]    **Consult Information**  Member Facility: [address] [place] [address] MRN: [id number]  Visit/Encounter Number: [id number]  Consult ID: [id number]  Facility Time Zone: CT  Date and Time of Request: 2022 09:26 PM  CT  Requesting Clinician: DR. VALLE  Patient Name: Yaneth Perez  Date of Birth: [birth year]  Gender: Female  Patient identity was confirmed at the beginning of the consult with the   patient/family/staff using two personal identifiers: Patient name and       **Reason for Consult**  Reason for Consult: [facility]    **Admission**  Admission Date: 2022  Chief reason for ICU admission: Hyperglycemia    **Core Metrics**  General orienting sentence for patient: :  44 y/o female resident of a   SNF. Presented with Hyperglycemia (Not in DKA). Started on an insulin gtt. Off insulin gtt at this point. Doing well. Eating.   Chief physiologic deterioration: None - Stable patient  Is the patient on DVT prophylaxis?: Yes  Prophylaxis type: Pharmacological  Is the patient on GI prophylaxis?: Not Indicated  Has this patient reached their nutritional goal?: Yes  Are there current issues with pain management in this patient?:   No  Are there issues with skin integrity?: No  Are there issues with delirium?: No  Has the patient been mobilized?: Yes  Is this patient currently intubated?: No  Are there ethical or care philosophy or family issues?: No  Do you recommend an in depth evaluation?: No  Disposition Recommendations: Downgrade/transition out of ICU    **Physician Signature**  This document was electronically signed by: Boston Burdick DO  2022 10:31   PM

## 2022-01-03 NOTE — PROGRESS NOTES
Occupational Therapy Initial Assessment  Date: 1/3/2022   Patient Name: Andi Juarez  MRN: [id number]     : 1970    Date of Service: 1/3/2022    Discharge Recommendations:  Home independently (with maximum support systems)       Assessment   Assessment: Evaluation completed and tx initiated. No overt barriers for discharge to home identified. All recommendations made this visit. Treatment Diagnosis: Hyperglycemia , ESRD  REQUIRES OT FOLLOW UP: No  Activity Tolerance  Activity Tolerance: Patient Tolerated treatment well  Safety Devices  Safety Devices in place: Yes  Type of devices: Call light within reach; Bed alarm in place           Patient Diagnosis(es): The encounter diagnosis was Hyperglycemia. has a past medical history of Arthritis, Chronic kidney disease, stage 5, kidney failure ([facility] [address] Closed fracture of right shoulder girdle, with routine healing, subsequent encounter, DM (diabetes mellitus) type II controlled with renal manifestation ([facility] [address] Gastroparesis, GERD (gastroesophageal reflux disease), Hemodialysis patient ([facility] [address] Hypertension, Hypothyroid, Nasal congestion, Neuropathy, Noncompliance with medications, Palliative care patient, and Restless leg syndrome. has a past surgical history that includes Tubal ligation; pr egd transoral biopsy single/multiple (N/A, 2016); Endometrial ablation (); pr colonoscopy w/biopsy single/multiple (N/A, 10/20/2017); pr egd transoral biopsy single/multiple (N/A, 10/20/2017); Dialysis fistula creation (Left, 2019); hc dialysis catheter (Right, 2019); Cholecystectomy, laparoscopic; liver biopsy (2018); vascular surgery (Left, ); vascular surgery (10/02/2017); vascular surgery (2018); vascular surgery (10/29/2018); vascular surgery (2018); vascular surgery (2019); vascular surgery (2019); vascular surgery (2019); Colonoscopy (Left, 2020);  Breast surgery (Left, ); and Breast surgery (Right, 5/25/2021).     Treatment Diagnosis: Hyperglycemia , ESRD      Restrictions  Restrictions/Precautions  Restrictions/Precautions: Fall Risk,Contact Precautions  Position Activity Restriction  Other position/activity restrictions: VRE    Subjective   General  Chart Reviewed: Yes  Patient assessed for rehabilitation services?: Yes  Family / Caregiver Present: No  Patient Currently in Pain: No  Vital Signs  Patient Currently in Pain: No    Social/Functional History  Social/Functional History  Lives With: Alone  Home Layout: One level  Home Access: Stairs to enter with rails  Entrance Stairs - Number of Steps: 4 steps 2 rails  Bathroom Equipment: Shower chair,3-in-1 commode  Home Equipment: Rolling walker,4 wheeled [employer]  Additional Comments: Most recently in LTC       Objective        Orientation  Overall Orientation Status: Within Normal Limits     Balance  Sitting Balance: Independent  Standing Balance: Independent (to supervision)  Functional Mobility  Assist Level: Supervision  Toilet Transfers  Toilet Transfer: Independent;Supervision  ADL  Feeding: Independent  Grooming: Independent  UE Bathing: Supervision  LE Bathing: Supervision  UE Dressing: Independent  LE Dressing: Independent  Toileting: Independent        Bed mobility  Supine to Sit: Independent  Sit to Supine: Independent  Transfers  Stand Step Transfers: Independent;Supervision     Cognition  Overall Cognitive Status: WFL                 LUE PROM (degrees)  LUE PROM: WNL  LUE AROM (degrees)  LUE AROM : WNL  RUE PROM (degrees)  RUE PROM: WNL  RUE AROM (degrees)  RUE AROM : WNL                  Tx initiated:  Home safety recommendations, therapeutic activity training (15 mins)    Plan   Plan  Plan Comment: No further visits planned    Goals  Short term goals  Short term goal 1: Patient will be independent with home safety recommendations, therapeutic activity recommendations               Sonny Navarro OT/L  Electronically signed by Kevon Hidalgo OT/LAUREEN on 1/3/2022 at 3:49 PM.

## 2022-01-03 NOTE — PROGRESS NOTES
Physical Therapy    Facility/Department: [facility]MED  Initial Assessment    NAME: Damaso Guzman  : 1970  MRN: [id number]    Date of Service: 1/3/2022    Discharge Recommendations:  Continue to assess pending progress        Assessment   Body structures, Functions, Activity limitations: Decreased functional mobility ; Decreased balance;Decreased strength  Assessment: Pt ABLE TO AMB IN ROOM WITH ASSIST, NO DIFFICULTY. WILL CONT TO PROGRESS AS TOLERATED. PT Education: PT Role;Plan of Care  REQUIRES PT FOLLOW UP: Yes  Activity Tolerance  Activity Tolerance: Patient Tolerated treatment well       Patient Diagnosis(es): The encounter diagnosis was Hyperglycemia. has a past medical history of Arthritis, Chronic kidney disease, stage 5, kidney failure ([facility] [address] Closed fracture of right shoulder girdle, with routine healing, subsequent encounter, DM (diabetes mellitus) type II controlled with renal manifestation ([facility] [address] Gastroparesis, GERD (gastroesophageal reflux disease), Hemodialysis patient ([facility] [address] Hypertension, Hypothyroid, Nasal congestion, Neuropathy, Noncompliance with medications, Palliative care patient, and Restless leg syndrome. has a past surgical history that includes Tubal ligation; pr egd transoral biopsy single/multiple (N/A, 2016); Endometrial ablation (); pr colonoscopy w/biopsy single/multiple (N/A, 10/20/2017); pr egd transoral biopsy single/multiple (N/A, 10/20/2017); Dialysis fistula creation (Left, 2019); hc dialysis catheter (Right, 2019); Cholecystectomy, laparoscopic; liver biopsy (2018); vascular surgery (Left, ); vascular surgery (10/02/2017); vascular surgery (2018); vascular surgery (10/29/2018); vascular surgery (2018); vascular surgery (2019); vascular surgery (2019); vascular surgery (2019); Colonoscopy (Left, 2020);  Breast surgery (Left, ); and Breast surgery (Right, 5/25/2021). Restrictions  Restrictions/Precautions  Restrictions/Precautions: Fall Risk  Vision/Hearing  Vision: Within Functional Limits  Hearing: Within functional limits     Subjective  General  Diagnosis: ESRD  Subjective  Subjective: Pt VERY PLEASANT, AWAITING A SHOWER  Pain Screening  Patient Currently in Pain: Denies  Vital Signs  Patient Currently in Pain: Denies       Orientation  Orientation  Overall Orientation Status: Within Functional Limits  Social/Functional History  Social/Functional History  Additional Comments: Pt REPORTS BEING ADMITTED FROM [facility] AND PLANS TO RETURN. STATES WAS AMBULATING WITH ASSIST AND DEVICE.   Cognition   Cognition  Overall Cognitive Status: WFL    Objective          AROM RLE (degrees)  RLE AROM: WFL  AROM LLE (degrees)  LLE AROM : WFL  Strength RLE  Comment: GROSSLY 4/5  Strength LLE  Comment: GROSSLY 4/5        Bed mobility  Supine to Sit: Independent  Sit to Supine: Independent  Transfers  Sit to Stand: Stand by assistance;Contact guard assistance  Stand to sit: Stand by assistance;Contact guard assistance  Bed to Chair: Contact guard assistance  Ambulation 1  Device: Hand-Held Assist  Assistance: Contact guard assistance  Quality of Gait: GUARDED PACE, NO LOB  Gait Deviations: Decreased step length;Decreased step height  Distance: 25'     Balance  Sitting - Dynamic: Good  Standing - Dynamic: Fair;+        Plan   Plan  Times per week: AT LEAST 5-6  Current Treatment Recommendations: Strengthening,Functional Mobility Training,Transfer Training,Balance Training,Gait Training,Safety Education & Training,Patient/Caregiver Education & Training  Safety Devices  Type of devices: Call light within reach (WITH PCA FOR SHOWER)    G-Code       OutComes Score                                                  AM-PAC Score             Goals  Short term goals  Time Frame for Short term goals: 14 DAYS  Short term goal 1: TRANSFERS INDEPENDENT  Short term goal 2: ' AAD SUP-IND       Therapy Time   Individual Concurrent Group Co-treatment   Time In           Time Out           Minutes                   Blanche Murphy, PT

## 2022-01-03 NOTE — CARE COORDINATION
Called and spoke with Bisi Carmona at [facility]- pt came from there but they can not accept her back. Was informed by Bisi Carmona that they were in the process of transferring pt to [facility] due to pt wanting to smoke and [place] does not allow smoking. Called and spoke with Anirudh at [facility] and asked if they were accepting pt, and was informed that carlos enrique told [facility] that they could not accept pt either. As of now pt does not hav a facility to go to. Will follow.   Electronically signed by Syeda Castro RN on 1/3/2022 at 11:45 AM

## 2022-01-03 NOTE — CONSULTS
Nephrology ([facility]) Consult Note      Patient:  Shey Evans  YOB: 1970  Date of Service: 1/2/2022  MRN: [id number]   Acct: [de-identified]   Primary Care Physician: ROMINA Whtie  Advance Directive: Full Code  Admit Date: 1/1/2022       Hospital Day: 1  Referring Provider: Leroy Major MD    Patient independently seen and examined, Chart, Consults, Notes, Operative notes, Labs, Cardiology, and Radiology studies reviewed as available. Subjective:  Shey Evans is a 45 y.o. female for whom we were consulted for evaluation and treatment of end-stage renal disease. Admitted for evaluation of hyperglycemia. She was due for dialysis today on the holiday schedule. She was seen on tolerating dialysis. Denied chest pain, shortness of at rest, nausea or vomiting. Noted some peripheral edema.     Dialysis   Pt was seen on renal replacement therapy and I have personally seen and evaluated the patient and directed the therapy  Modality: Hemodialysis  Access: Arterial Venous Fistula  Location: left upper  QB: 450  QD: 700  UF: 1120      Allergies:  Penicillins, Lamisil advanced [terbinafine], Reglan [metoclopramide], and Stadol [butorphanol]    Medicines:  Current Facility-Administered Medications   Medication Dose Route Frequency Provider Last Rate Last Admin    cloNIDine (CATAPRES) 0.1 MG/24HR 1 patch  1 patch TransDERmal Weekly May Fall, MD   1 patch at 01/02/22 1250    hydrALAZINE (APRESOLINE) tablet 100 mg  100 mg Oral 3 times per day May Fall, MD   100 mg at 01/02/22 1646    HYDROcodone-acetaminophen (NORCO) 5-325 MG per tablet 1 tablet  1 tablet Oral Q6H PRN May Fall, MD   1 tablet at 01/02/22 0406    isosorbide mononitrate (IMDUR) extended release tablet 120 mg  120 mg Oral Daily May Fall, MD   120 mg at 01/02/22 1252    lactulose (CHRONULAC) 10 GM/15ML solution 15.3333 g  15.3333 g Oral BID Trevor Chamorro Rober Armijo MD   15.3333 g at 01/02/22 1247    levothyroxine (SYNTHROID) tablet 150 mcg  150 mcg Oral Daily Leonides Bass MD   150 mcg at 01/02/22 0612    metoprolol succinate (TOPROL XL) extended release tablet 100 mg  100 mg Oral QAM AC Leonides Bass MD   100 mg at 01/02/22 0612    rOPINIRole (REQUIP) tablet 1 mg  1 mg Oral Nightly Leonides Bass MD        [facility]) tablet 8.6 mg  1 tablet Oral Daily Leonides Bass MD   8.6 mg at 01/02/22 1251    sevelamer (RENVELA) tablet 800 mg  800 mg Oral TID  Leonides Bass MD   800 mg at 01/02/22 1745    atorvastatin (LIPITOR) tablet 20 mg  20 mg Oral Daily Leonides Bass MD   20 mg at 01/02/22 1252    DULoxetine (CYMBALTA) extended release capsule 20 mg  20 mg Oral Daily Leonides Bass MD   20 mg at 01/02/22 1251    insulin lispro (HUMALOG) injection vial 0-12 Units  0-12 Units SubCUTAneous TID  Leonides Bass MD   2 Units at 01/02/22 1751    insulin lispro (HUMALOG) injection vial 0-6 Units  0-6 Units SubCUTAneous Nightly Leonides Bass MD        glucose (GLUTOSE) 40 % oral gel 15 g  15 g Oral PRN Leonides Bass MD        dextrose 50 % IV solution  12.5 g IntraVENous PRN Leonides Bass MD        glucagon (rDNA) injection 1 mg  1 mg IntraMUSCular PRN Leonides Bass MD        dextrose 5 % solution  100 mL/hr IntraVENous PRN Leonides Bass MD        hydrALAZINE (APRESOLINE) injection 5 mg  5 mg IntraVENous Q4H PRN Leonides Bass MD   5 mg at 01/02/22 0406    albuterol (PROVENTIL) nebulizer solution 2.5 mg  2.5 mg Nebulization Q6H PRN Leonides Bass MD        vitamin D (ERGOCALCIFEROL) capsule 50,000 Units  50,000 Units Oral Weekly Leonides Bass MD   50,000 Units at 01/02/22 0612    lisinopril (PRINIVIL;ZESTRIL) tablet 20 mg  20 mg Oral BID Leonides Bass MD   20 mg at 01/02/22 1250    NIFEdipine (PROCARDIA XL) extended release tablet 90 mg  90 mg Oral Daily Suad Sanchez MD   90 mg at 01/02/22 1252    0.9 % sodium chloride bolus  100 mL IntraVENous PRN Maximus Rodriges MD        insulin glargine (LANTUS) injection vial 25 Units  25 Units SubCUTAneous Nightly Jonnie Cortez MD        insulin lispro (HUMALOG) injection vial 7 Units  7 Units SubCUTAneous TID WC Jonnie Cortez MD   7 Units at 01/02/22 1745    sodium chloride flush 0.9 % injection 5-40 mL  5-40 mL IntraVENous 2 times per day Suad Sanchez MD   10 mL at 01/02/22 1253    sodium chloride flush 0.9 % injection 5-40 mL  5-40 mL IntraVENous PRN Suad Sanchez MD        0.9 % sodium chloride infusion  25 mL IntraVENous PRN Suad Sanchez MD        ondansetron (ZOFRAN-ODT) disintegrating tablet 4 mg  4 mg Oral Q8H PRN Suad Sanchez MD        Or    ondansetron [facility]) injection 4 mg  4 mg IntraVENous Q6H PRN Suad Sanchez MD   4 mg at 01/02/22 0406    polyethylene glycol (GLYCOLAX) packet 17 g  17 g Oral Daily PRN Suad Sanchez MD        acetaminophen (TYLENOL) tablet 650 mg  650 mg Oral Q6H PRN Suad Sanchez MD        Or   Odilia Garibay acetaminophen (TYLENOL) suppository 650 mg  650 mg Rectal Q6H PRN Suad Sanchez MD           Past Medical History:  Past Medical History:   Diagnosis Date    Arthritis     Chronic kidney disease, stage 5, kidney failure ([facility] [address]     Closed fracture of right shoulder girdle, with routine healing, subsequent encounter     DM (diabetes mellitus) type II controlled with renal manifestation ([facility] [address] 06/1993    Gastroparesis     GERD (gastroesophageal reflux disease)     Hemodialysis patient ([facility] [address]     dialysis on tues, thur, and sat at [facility] clinic    Hypertension     Hypothyroid     Nasal congestion     recent    Neuropathy     Noncompliance with medications     Palliative care patient 10/08/2019    Restless leg syndrome Past Surgical History:  Past Surgical History:   Procedure Laterality Date    BREAST SURGERY Left 2008    infected milk gland removed    BREAST SURGERY Right 5/25/2021    WEDGE EXCISION RIGHT BREAST DUCT WITH LACRIMAL DUCT PROBE, PEC BLOCK performed by rEika Jeffery MD at [address], [facility]      2008    COLONOSCOPY Left 9/17/2020    Dr Deanna Del Toro hepatic flexure-Severe tortuosity with severe spasming, 1 yr recall    DIALYSIS FISTULA CREATION Left 1/25/2019    REVISION LEFT UPPER EXTREMITY AV ACCESS WITH LEFT BRACHIAL ARTERY TO LEFT AXILLARY VEIN WITH  INTERPOSITIONAL ARTEGRAFT   performed by Jluis Aldridge MD at [address]  [address]    [address] Right 1/25/2019    INSERTION OF RIGHT INTERNAL JUGULAR HEMODIALYSIS CATHETER performed by Jluis Aldridge MD at [facility] [facility]  08/17/2018    1.  Moderately increased stainable iron identified by special stain in Kupffer cells and occasional hepatocytes. Negative for evidence of significant portal or lobular inflammation. Negative for evidence of significant fibrosis    [place] COLONOSCOPY W/BIOPSY SINGLE/MULTIPLE N/A 10/20/2017    Dr Quirino Crouch prep-Tubular AP (-) dysplasia x 2--3 yr recall    [place] EGD TRANSORAL BIOPSY SINGLE/MULTIPLE N/A 12/27/2016    Dr Danitza Mcfarlane EGD TRANSORAL BIOPSY SINGLE/MULTIPLE N/A 10/20/2017    Dr Wilfred Brenner (-)   [address] [facility] VASCULAR SURGERY Left 2016    fistula by Dr Liyah Tong at [address]  10/02/2017    SJS. Left upper fistulograms/venograms, balloon angioplasty cephalic vein arch 43J45 conquest.    VASCULAR SURGERY  08/2018    FISTULOGRAM    VASCULAR SURGERY  10/29/2018    SJS. Left upper fistulograms/venograms    VASCULAR SURGERY  12/19/2018    SJS. Arch aortogram,left upper arteriograms.  VASCULAR SURGERY  03/06/2019    SJS. Removal of tunneled dialyis catheter right internal jugular vein.     VASCULAR SURGERY  08/28/2019 Not on file    Ran Out of Food in the Last Year: Not on file   Transportation Needs:     Lack of Transportation (Medical): Not on file    Lack of Transportation (Non-Medical): Not on file   Physical Activity:     Days of Exercise per Week: Not on file    Minutes of Exercise per Session: Not on file   Stress:     Feeling of Stress : Not on file   Social Connections:     Frequency of Communication with Friends and Family: Not on file    Frequency of Social Gatherings with Friends and Family: Not on file    Attends Evangelical Services: Not on file    Active Member of [address] [place] [address] [employer] or Organizations: Not on file    Attends Club or Organization Meetings: Not on file    Marital Status: Not on file   Intimate Partner Violence:     Fear of Current or Ex-Partner: Not on file    Emotionally Abused: Not on file    Physically Abused: Not on file    Sexually Abused: Not on file   Housing Stability:     Unable to Pay for Housing in the Last Year: Not on file    Number of [place] in the Last Year: Not on file    Unstable Housing in the Last Year: Not on file         Review of Systems:  History obtained from chart review and the patient  General ROS: No fever or chills  Respiratory ROS: No cough, shortness of breath, wheezing  Cardiovascular ROS: No chest pain or palpitations  Gastrointestinal ROS: No abdominal pain or melena  Genito-Urinary ROS: No dysuria or hematuria  Musculoskeletal ROS: No joint pain or swelling   14 point ROS reviewed with the patient and negative except as noted above and in the HPI unless unable to obtain.     Objective:  Patient Vitals for the past 24 hrs:   BP Temp Temp src Pulse Resp SpO2 Height Weight   01/02/22 1832 (!) 104/54   74 16 95 %     01/02/22 1800 132/66   71 13 95 %     01/02/22 1730 138/61   71 10 94 %     01/02/22 1700 (!) 135/50 99.1 °F (37.3 °C) Temporal 74 14 94 %     01/02/22 1630 (!) 143/62   71 12 91 %     01/02/22 1600 (!) 144/57   70 17 93 %   01/02/22 1530 (!) 152/66   71 16 94 %     01/02/22 1500 (!) 156/81   72 11 95 %     01/02/22 1430 (!) 133/59   67 10 95 %     01/02/22 1400 (!) 175/68   72 12 96 %     01/02/22 1330 (!) 174/62   70 11 99 %     01/02/22 1300 89/71   68 15 96 %     01/02/22 1250 (!) 164/62          01/02/22 1240 (!) 166/62 97.4 °F (36.3 °C) Temporal 67 15 96 %     01/02/22 0600 (!) 153/76   68 18 92 %     01/02/22 0500 (!) 158/70   68 18 93 %     01/02/22 0406 (!) 173/73          01/02/22 0400 (!) 173/73 98.8 °F (37.1 °C) Temporal 67 24 94 %     01/02/22 0300 (!) 154/73   67 17 94 %     01/02/22 0200 (!) 151/58   65 17 90 %     01/02/22 0100 (!) 160/62   65 (!) 31 92 %     01/01/22 2354 (!) 160/71 98.2 °F (36.8 °C) Temporal 66 15 94 % 5' 6\" (1.676 m) 178 lb 5.6 oz (80.9 kg)   01/01/22 2229 (!) 160/75   65 17 95 %     01/01/22 2108 (!) 155/70 98.4 °F (36.9 °C) Oral 65 20 97 % 5' 6\" (1.676 m) 140 lb (63.5 kg)       Intake/Output Summary (Last 24 hours) at 1/2/2022 1916  Last data filed at 1/2/2022 1330  Gross per 24 hour   Intake 208.66 ml   Output 3500 ml   Net -3291.34 ml     General: awake/alert   Chest:  clear to auscultation bilaterally  CVS: regular rate and rhythm  Abdominal: soft, nontender, normal bowel sounds  Extremities: no cyanosis, ble edema  Skin: warm and dry without rash      Labs:  BMP:   Recent Labs     01/01/22  2130 01/01/22 2221 01/02/22 0031 01/02/22  0651   *  --  120* 126*   K 4.8 4.6 4.0 3.6   CL 80*  --  83* 88*   CO2 23  --  22 21*   PHOS  --   --  4.8*  --    BUN 40*  --  41* 41*   CREATININE 4.3*  --  4.5* 4.7*   CALCIUM 8.7  --  8.1* 8.3*     CBC:   Recent Labs     01/01/22 2130 01/02/22 0031 01/02/22  0209   WBC 4.5* 4.6* 4.7*   HGB 10.0* 9.0* 8.9*   HCT 33.2* 28.6* 28.4*   MCV 94.3 92.3 91.0   * 88* 84*     LIVER PROFILE:   Recent Labs     01/01/22 2130   AST 20   ALT 15   BILITOT 0.6   ALKPHOS 118*     PT/INR:   Recent Labs     01/01/22 2130   PROTIME 13.0   INR 0.96     APTT:   Recent Labs     01/01/22 2130   APTT 29.1     BNP:  No results for input(s): BNP in the last 72 hours. Ionized Calcium:No results for input(s): IONCA in the last 72 hours. Magnesium:  Recent Labs     01/02/22  0209   MG 2.3     Phosphorus:  Recent Labs     01/02/22  0031   PHOS 4.8*     HgbA1C: No results for input(s): LABA1C in the last 72 hours. Hepatic:   Recent Labs     01/01/22 2130   ALKPHOS 118*   ALT 15   AST 20   PROT 7.3   BILITOT 0.6   LABALBU 4.2     Lactic Acid:   Recent Labs     01/01/22 2130   LACTA 0.8     Troponin: No results for input(s): CKTOTAL, CKMB, TROPONINT in the last 72 hours. ABGs: No results for input(s): PH, PCO2, PO2, HCO3, O2SAT in the last 72 hours. CRP:  No results for input(s): CRP in the last 72 hours. Sed Rate:  No results for input(s): SEDRATE in the last 72 hours. Cultures:   No results for input(s): CULTURE in the last 72 hours. No results for input(s): BC, Malcolm Cambric in the last 72 hours. No results for input(s): CXSURG in the last 72 hours. Radiology reports as per the Radiologist  Radiology: XR CHEST PORTABLE    Result Date: 1/1/2022  XR CHEST PORTABLE 1/1/2022 9:31 PM HISTORY: Shortness of air COMPARISON: 12/14/2021 CXR: A single frontal view of the chest is performed. Findings: Stable cardiomegaly. Persistent prominence of pulmonary vascular markings as well as interstitial densities. No pleural effusion or pneumothorax identified. Left subclavian vascular stent. No acute regional bony pathology. 1. Cardiomegaly with mild CHF. . Signed by Dr Amanda Ho   ESRD  DM2 with nephropathy on long-term insulin  HTN  Hyponatremia  Hyperkalemia  Secondary Hyperparathyroidism  Anemia CKD  Seizure disorder  Hypothyroidism      Plan:  Discussed with patient, nursing  HD today  Monitor labs          Thank you for the consult, we appreciate the opportunity to provide care to your patients. Feel free to contact me if I can be of any further assistance.       Reyna Huang MD  01/02/22  7:16 PM

## 2022-01-03 NOTE — PROGRESS NOTES
Nephrology ([facility]) Progress Note    Patient:  Kelsy Diaz  YOB: 1970  Date of Service: 1/3/2022  MRN: [id number]   Acct: [de-identified]   Primary Care Physician: ROMINA Fernandes  Advance Directive: Full Code  Admit Date: 1/1/2022       Hospital Day: 2  Referring Provider: Armando Cowan MD    Patient Seen, Chart, Consults notes, Labs, Radiology studies reviewed. Subjective:    Kelsy Diaz is a 45 y.o. female for whom we were consulted for evaluation and treatment of end-stage renal disease. Admitted for evaluation of hyperglycemia. She was due for dialysis for the holiday schedule. Denied chest pain, shortness of at rest, nausea or vomiting. Noted some peripheral edema. Today, she offered no new complaints.       Allergies:  Penicillins, Lamisil advanced [terbinafine], Reglan [metoclopramide], and Stadol [butorphanol]    Medicines:  Current Facility-Administered Medications   Medication Dose Route Frequency Provider Last Rate Last Admin    insulin glargine (LANTUS) injection vial 25 Units  25 Units SubCUTAneous BID Armando Cowan MD   25 Units at 01/03/22 1015    cloNIDine (CATAPRES) 0.1 MG/24HR 1 patch  1 patch TransDERmal Weekly Dennie Reek, MD   1 patch at 01/02/22 1250    hydrALAZINE (APRESOLINE) tablet 100 mg  100 mg Oral 3 times per day Dennie Reek, MD   100 mg at 01/03/22 0614    HYDROcodone-acetaminophen (NORCO) 5-325 MG per tablet 1 tablet  1 tablet Oral Q6H PRN Dennie Reek, MD   1 tablet at 01/02/22 0406    isosorbide mononitrate (IMDUR) extended release tablet 120 mg  120 mg Oral Daily Dennie Reek, MD   120 mg at 01/03/22 1045    lactulose (CHRONULAC) 10 GM/15ML solution 15.3333 g  15.3333 g Oral BID Dennie Reek, MD   15.3333 g at 01/03/22 1044    levothyroxine (SYNTHROID) tablet 150 mcg  150 mcg Oral Daily Dennie Reek, MD   150 mcg at 01/03/22 0371    metoprolol succinate (TOPROL XL) extended release tablet 100 mg  100 mg Oral QAM AC Marychuy Regan MD   100 mg at 01/03/22 7286    rOPINIRole (REQUIP) tablet 1 mg  1 mg Oral Nightly Marychuy Regan MD   1 mg at 01/02/22 2150    senna (SENOKOT) tablet 8.6 mg  1 tablet Oral Daily Marychuy Regan MD   8.6 mg at 01/03/22 1045    sevelamer (RENVELA) tablet 800 mg  800 mg Oral TID  Marychuy Regan MD   800 mg at 01/03/22 1335    atorvastatin (LIPITOR) tablet 20 mg  20 mg Oral Daily Marychuy Regan MD   20 mg at 01/03/22 1045    DULoxetine (CYMBALTA) extended release capsule 20 mg  20 mg Oral Daily Marychuy Regan MD   20 mg at 01/03/22 1045    insulin lispro (HUMALOG) injection vial 0-12 Units  0-12 Units SubCUTAneous TID  Marychuy Regan MD   4 Units at 01/03/22 1330    insulin lispro (HUMALOG) injection vial 0-6 Units  0-6 Units SubCUTAneous Nightly Marychuy Regan MD        glucose (GLUTOSE) 40 % oral gel 15 g  15 g Oral PRN Marychuy Regan MD        dextrose 50 % IV solution  12.5 g IntraVENous PRN Marychuy Regan MD        glucagon (rDNA) injection 1 mg  1 mg IntraMUSCular PRN Marychuy Regan MD        dextrose 5 % solution  100 mL/hr IntraVENous PRN Marychuy Regan MD        hydrALAZINE (APRESOLINE) injection 5 mg  5 mg IntraVENous Q4H PRN Marychuy Regan MD   5 mg at 01/02/22 0406    albuterol (PROVENTIL) nebulizer solution 2.5 mg  2.5 mg Nebulization Q6H PRN Marychuy Regan MD        vitamin D (ERGOCALCIFEROL) capsule 50,000 Units  50,000 Units Oral Weekly Marychuy Regan MD   50,000 Units at 01/02/22 0612    lisinopril (PRINIVIL;ZESTRIL) tablet 20 mg  20 mg Oral BID Marychuy Regan MD   20 mg at 01/03/22 1045    NIFEdipine (PROCARDIA XL) extended release tablet 90 mg  90 mg Oral Daily Marychuy Regan MD   90 mg at 01/03/22 1045    0.9 % sodium chloride bolus  100 mL IntraVENous PRN Peter Cabral MD        insulin lispro (HUMALOG) injection vial 7 Units  7 Units SubCUTAneous TID  Chester Wright MD   7 Units at 01/03/22 1330    sodium chloride flush 0.9 % injection 5-40 mL  5-40 mL IntraVENous 2 times per day Shermon Simmonds, MD   10 mL at 01/03/22 1046    sodium chloride flush 0.9 % injection 5-40 mL  5-40 mL IntraVENous PRN Shermon Simmonds, MD        0.9 % sodium chloride infusion  25 mL IntraVENous PRN Shermon Simmonds, MD        ondansetron (ZOFRAN-ODT) disintegrating tablet 4 mg  4 mg Oral Q8H PRN Shermon Simmonds, MD        Or    ondansetron [facility]) injection 4 mg  4 mg IntraVENous Q6H PRN Shermon Simmonds, MD   4 mg at 01/02/22 0406    polyethylene glycol (GLYCOLAX) packet 17 g  17 g Oral Daily PRN Shermon Simmonds, MD        acetaminophen (TYLENOL) tablet 650 mg  650 mg Oral Q6H PRN Shermon Simmonds, MD        Or   Kaleb Layer acetaminophen (TYLENOL) suppository 650 mg  650 mg Rectal Q6H PRN Shermon Simmonds, MD           Past Medical History:  Past Medical History:   Diagnosis Date    Arthritis     Chronic kidney disease, stage 5, kidney failure ([facility] [address]     Closed fracture of right shoulder girdle, with routine healing, subsequent encounter     DM (diabetes mellitus) type II controlled with renal manifestation ([facility] [address] 06/1993    Gastroparesis     GERD (gastroesophageal reflux disease)     Hemodialysis patient ([facility] [address]     dialysis on tues, thur, and sat [facility]    Hypertension     Hypothyroid     Nasal congestion     recent    Neuropathy     Noncompliance with medications     Palliative care patient 10/08/2019    Restless leg syndrome        Past Surgical History:  Past Surgical History:   Procedure Laterality Date    BREAST SURGERY Left 2008    infected milk gland removed    BREAST SURGERY Right 5/25/2021    WEDGE EXCISION RIGHT BREAST DUCT WITH LACRIMAL DUCT PROBE, PEC BLOCK performed by Mickey Florian Minesh Luz MD at [facility], [facility]      2008    COLONOSCOPY Left 9/17/2020    Dr Lonny South hepatic flexure-Severe tortuosity with severe spasming, 1 yr recall    DIALYSIS FISTULA CREATION Left 1/25/2019    REVISION LEFT UPPER EXTREMITY AV ACCESS WITH LEFT BRACHIAL ARTERY TO LEFT AXILLARY VEIN WITH  INTERPOSITIONAL ARTEGRAFT   performed by Jordan Crawford MD at [address]  [address]    [address] Right 1/25/2019    INSERTION OF RIGHT INTERNAL JUGULAR HEMODIALYSIS CATHETER performed by Jordan Crawford MD at [facility]  08/17/2018    1.  Moderately increased stainable iron identified by special stain in Kupffer cells and occasional hepatocytes. Negative for evidence of significant portal or lobular inflammation. Negative for evidence of significant fibrosis    [place] COLONOSCOPY W/BIOPSY SINGLE/MULTIPLE N/A 10/20/2017    Dr Tej Lane prep-Tubular AP (-) dysplasia x 2--3 yr recall    [place] EGD TRANSORAL BIOPSY SINGLE/MULTIPLE N/A 12/27/2016    Dr Olga Rhodes EGD TRANSORAL BIOPSY SINGLE/MULTIPLE N/A 10/20/2017    Dr Bob Liao (-)   [address] [facility] VASCULAR SURGERY Left 2016    fistula by Dr Yuli Hernandez at [address]  10/02/2017    SJS. Left upper fistulograms/venograms, balloon angioplasty cephalic vein arch [id number] conquest.    VASCULAR SURGERY  08/2018    FISTULOGRAM    VASCULAR SURGERY  10/29/2018    SJS. Left upper fistulograms/venograms    VASCULAR SURGERY  12/19/2018    SJS. Arch aortogram,left upper arteriograms.  VASCULAR SURGERY  03/06/2019    SJS. Removal of tunneled dialyis catheter right internal jugular vein.  VASCULAR SURGERY  08/28/2019    SJS. Left upper extremity fistulogram including venography to the superior vena cava. Balloon angioplasty left subclavian vein with 10mm x 40mm conquest balloon. Balloon angioplasty mid/proximal upper arm cephalic vein with 65PG x 40mm conquest balloon. Venograms after balloon angioplasty. Stent left mid/proximal upper arm cephalic vein stenosis fluency 10mm x 60mm self-expanding covered stent. Balloon     VASCULAR SURGERY  2019    cont angioplasty stent with 10mm x 40mm conquest balloon. Completion venograms left upper extremity. Family History  Family History   Problem Relation Age of Onset    Colon Cancer Mother          at 63    Colon Polyps Mother     Lung Cancer Father          at 79    Colon Polyps Father     Stomach Cancer Sister     Breast Cancer Sister 27    Depression Daughter 25        committed suicide    Liver Cancer Neg Hx     Liver Disease Neg Hx     Esophageal Cancer Neg Hx     Rectal Cancer Neg Hx        Social History  Social History     Socioeconomic History    Marital status: Single     Spouse name: Not on file    Number of children: 2    Years of education: Not on file    Highest education level: Not on file   Occupational History    Not on file   Tobacco Use    Smoking status: Current Every Day Smoker     Packs/day: 0.50     Years: 33.00     Pack years: 16.50     Types: Cigarettes    Smokeless tobacco: Never Used    Tobacco comment: NOW at 1/4 PD, started at age 24 and has averaged 2 PPD   Vaping Use    Vaping Use: Never used   Substance and Sexual Activity    Alcohol use: No    Drug use: Not Currently     Types: Marijuana Julia Peralta)    Sexual activity: Not Currently     Partners: Male   Other Topics Concern    Not on file   Social History Narrative    Not on file     Social Determinants of Health     Financial Resource Strain:     Difficulty of Paying Living Expenses: Not on file   Food Insecurity:     Worried About Running Out of Food in the Last Year: Not on file    Kendy of Food in the Last Year: Not on file   Transportation Needs:     Lack of Transportation (Medical): Not on file    Lack of Transportation (Non-Medical):  Not on file   Physical Activity:     Days of Exercise per Week: Not on file    Minutes of nephropathy on long-term insulin  -HTN  -Hyponatremia  -Hyperkalemia  -Secondary Hyperparathyroidism  -Anemia CKD  -Seizure disorder  -Hypothyroidism      Plan:  Dialysis is due again on 1/4. Follow up labs.

## 2022-01-03 NOTE — PROGRESS NOTES
Progress Note  Date:1/3/2022       Room:0306/306-01  Patient Shasha Packer     YOB: 1970     Age:51 y.o. Subjective    Subjective: 39-year-old lady with a history of noncompliance, end-stage renal disease on dialysis, uncontrolled type 2 diabetes noncompliant with insulin, gastroparesis, restless leg syndrome, presented from [facility] nursing [facility] with concerns of fatigue and thirst.  On admission was noted to have glucose of 879, admitted in the ICU on insulin drip, overall improved, transition to subcu insulin and transferred to the medical romo. Patient was seen in house today, denied any acute overnight event, today she slept well, denied any recurrent nausea or emesis. Blood sugar still little bit elevated and insulin regimen adjusted today. Plans to return back to SNF however has been declined admission at this time and / currently working on safe discharge planning. Review of Systems: 12 point system review negative suggested above. Objective         Vitals Last 24 Hours:  TEMPERATURE:  Temp  Av.1 °F (36.7 °C)  Min: 97.6 °F (36.4 °C)  Max: 99.2 °F (37.3 °C)  RESPIRATIONS RANGE: Resp  Av.8  Min: 10  Max: 21  PULSE OXIMETRY RANGE: SpO2  Av.2 %  Min: 90 %  Max: 99 %  PULSE RANGE: Pulse  Av.4  Min: 68  Max: 75  BLOOD PRESSURE RANGE: Systolic (24WZE), HXJ:817 , Min:104 , DWF:691   ; Diastolic (75EDD), FKL:21, Min:54, Max:78    I/O (24Hr): Intake/Output Summary (Last 24 hours) at 1/3/2022 1720  Last data filed at 2022 1800  Gross per 24 hour   Intake 100 ml   Output    Net 100 ml       Physical Examination:  General: Well-developed, no acute distress lying comfortably in bed.   HEENT: Atraumatic normocephalic, range of motion normal   Cardiac: Normal S1-S2  Respiratory: clear To auscultation bilaterally, no rhonchi or rales, no wheezing  Abdomen: Soft, positive bowel sounds in all quadrants, no distention, nontender to palpation  Extremities: no tenderness, no edema, moves all extremities  Psych: Affect normal and good eye contact, behavioral normal.        Labs/Imaging/Diagnostics    Labs:  CBC:  Recent Labs     01/02/22 0031 01/02/22 0209 01/03/22 0318   WBC 4.6* 4.7* 4.8   RBC 3.10* 3.12* 3.24*   HGB 9.0* 8.9* 9.1*   HCT 28.6* 28.4* 29.0*   MCV 92.3 91.0 89.5   RDW 14.6* 14.4 14.7*   PLT 88* 84* 106*     CHEMISTRIES:  Recent Labs     01/02/22 0031 01/02/22 0031 01/02/22 0209 01/02/22 0651 01/03/22 0318   *  --   --  126* 132*   K 4.0  --   --  3.6 4.3   CL 83*  --   --  88* 94*   CO2 22  --   --  21* 25   BUN 41*  --   --  41* 26*   CREATININE 4.5*  --   --  4.7* 3.6*   GLUCOSE 745*   < > 607* 122* 226*   PHOS 4.8*  --   --   --   --    MG  --   --  2.3  --  2.3    < > = values in this interval not displayed. PT/INR:  Recent Labs     01/01/22 2130   PROTIME 13.0   INR 0.96     APTT:  Recent Labs     01/01/22 2130   APTT 29.1     LIVER PROFILE:  Recent Labs     01/01/22 2130 01/03/22  0318   AST 20 14   ALT 15 10   BILITOT 0.6 0.3   ALKPHOS 118* 84       Imaging Last 24 Hours:  XR CHEST PORTABLE    Result Date: 1/1/2022  XR CHEST PORTABLE 1/1/2022 9:31 PM HISTORY: Shortness of air COMPARISON: 12/14/2021 CXR: A single frontal view of the chest is performed. Findings: Stable cardiomegaly. Persistent prominence of pulmonary vascular markings as well as interstitial densities. No pleural effusion or pneumothorax identified. Left subclavian vascular stent. No acute regional bony pathology. 1. Cardiomegaly with mild CHF. . Signed by Dr Nimisha Marquez    Assessment//Plan           Hospital Problems           Last Modified POA    Hyperglycemia 1/1/2022 Yes        Assessment & Plan      Uncontrolled type 2 diabetes with noncompliance to insulin  Transition of insulin drip currently on subcu  Continue insulin regimen as currently ordered  Hypoglycemia protocol  Reiterated importance of compliance to medical regimen. End-stage renal disease on dialysis  Nephrology following for in-house dialysis session. Tobacco use disorder  Counseled on cessation    Hypertension  Continue clonidine patch, hydralazine, Imdur, lisinopril, nifedipine    Restless leg syndrome: Continue Requip    Gastroparesis: Secondary to noncompliance to regimen with uncontrolled diabetes. Hypothyroidism: Continue Synthroid. Code: Full  Diet: Diabetic/renal  Disposition: Still working on safe discharge planning.         Electronically signed by Paz Feng MD on 1/3/22 at 5:20 PM CST

## 2022-01-04 LAB
ALBUMIN SERPL-MCNC: 3.3 G/DL (ref 3.5–5.2)
ALP BLD-CCNC: 82 U/L (ref 35–104)
ALT SERPL-CCNC: 9 U/L (ref 5–33)
ANION GAP SERPL CALCULATED.3IONS-SCNC: 13 MMOL/L (ref 7–19)
AST SERPL-CCNC: 15 U/L (ref 5–32)
BASOPHILS ABSOLUTE: 0.1 K/UL (ref 0–0.2)
BASOPHILS RELATIVE PERCENT: 1.3 % (ref 0–1)
BILIRUB SERPL-MCNC: 0.3 MG/DL (ref 0.2–1.2)
BUN BLDV-MCNC: 38 MG/DL (ref 6–20)
CALCIUM SERPL-MCNC: 8.6 MG/DL (ref 8.6–10)
CHLORIDE BLD-SCNC: 94 MMOL/L (ref 98–111)
CO2: 25 MMOL/L (ref 22–29)
CREAT SERPL-MCNC: 5.2 MG/DL (ref 0.5–0.9)
EOSINOPHILS ABSOLUTE: 0.3 K/UL (ref 0–0.6)
EOSINOPHILS RELATIVE PERCENT: 6.6 % (ref 0–5)
GFR AFRICAN AMERICAN: 10
GFR NON-AFRICAN AMERICAN: 9
GLUCOSE BLD-MCNC: 183 MG/DL (ref 70–99)
GLUCOSE BLD-MCNC: 194 MG/DL (ref 70–99)
GLUCOSE BLD-MCNC: 195 MG/DL (ref 70–99)
GLUCOSE BLD-MCNC: 67 MG/DL (ref 70–99)
GLUCOSE BLD-MCNC: 72 MG/DL (ref 74–109)
GLUCOSE BLD-MCNC: 74 MG/DL (ref 70–99)
GLUCOSE BLD-MCNC: 92 MG/DL (ref 70–99)
HCT VFR BLD CALC: 27.3 % (ref 37–47)
HEMOGLOBIN: 8.5 G/DL (ref 12–16)
IMMATURE GRANULOCYTES #: 0 K/UL
LYMPHOCYTES ABSOLUTE: 1.5 K/UL (ref 1.1–4.5)
LYMPHOCYTES RELATIVE PERCENT: 31.7 % (ref 20–40)
MAGNESIUM: 2.5 MG/DL (ref 1.6–2.6)
MCH RBC QN AUTO: 28.7 PG (ref 27–31)
MCHC RBC AUTO-ENTMCNC: 31.1 G/DL (ref 33–37)
MCV RBC AUTO: 92.2 FL (ref 81–99)
MONOCYTES ABSOLUTE: 0.5 K/UL (ref 0–0.9)
MONOCYTES RELATIVE PERCENT: 11.1 % (ref 0–10)
NEUTROPHILS ABSOLUTE: 2.3 K/UL (ref 1.5–7.5)
NEUTROPHILS RELATIVE PERCENT: 49.1 % (ref 50–65)
PDW BLD-RTO: 14.8 % (ref 11.5–14.5)
PERFORMED ON: ABNORMAL
PERFORMED ON: NORMAL
PERFORMED ON: NORMAL
PLATELET # BLD: 111 K/UL (ref 130–400)
PMV BLD AUTO: 9.8 FL (ref 9.4–12.3)
POTASSIUM REFLEX MAGNESIUM: 4.7 MMOL/L (ref 3.5–5)
RBC # BLD: 2.96 M/UL (ref 4.2–5.4)
SODIUM BLD-SCNC: 132 MMOL/L (ref 136–145)
TOTAL PROTEIN: 6 G/DL (ref 6.6–8.7)
WBC # BLD: 4.7 K/UL (ref 4.8–10.8)

## 2022-01-04 PROCEDURE — 6370000000 HC RX 637 (ALT 250 FOR IP): Performed by: HOSPITALIST

## 2022-01-04 PROCEDURE — 2580000003 HC RX 258: Performed by: HOSPITALIST

## 2022-01-04 PROCEDURE — 36415 COLL VENOUS BLD VENIPUNCTURE: CPT

## 2022-01-04 PROCEDURE — 97116 GAIT TRAINING THERAPY: CPT

## 2022-01-04 PROCEDURE — 1210000000 HC MED SURG R&B

## 2022-01-04 PROCEDURE — 80053 COMPREHEN METABOLIC PANEL: CPT

## 2022-01-04 PROCEDURE — 85025 COMPLETE CBC W/AUTO DIFF WBC: CPT

## 2022-01-04 PROCEDURE — 82947 ASSAY GLUCOSE BLOOD QUANT: CPT

## 2022-01-04 PROCEDURE — 8010000000 HC HEMODIALYSIS ACUTE INPT

## 2022-01-04 PROCEDURE — 83735 ASSAY OF MAGNESIUM: CPT

## 2022-01-04 RX ORDER — INSULIN GLARGINE 100 [IU]/ML
15 INJECTION, SOLUTION SUBCUTANEOUS 2 TIMES DAILY
Status: DISCONTINUED | OUTPATIENT
Start: 2022-01-04 | End: 2022-01-05 | Stop reason: HOSPADM

## 2022-01-04 RX ADMIN — HYDRALAZINE HYDROCHLORIDE 100 MG: 50 TABLET, FILM COATED ORAL at 21:50

## 2022-01-04 RX ADMIN — ROPINIROLE HYDROCHLORIDE 1 MG: 1 TABLET, FILM COATED ORAL at 21:50

## 2022-01-04 RX ADMIN — HYDRALAZINE HYDROCHLORIDE 100 MG: 50 TABLET, FILM COATED ORAL at 06:45

## 2022-01-04 RX ADMIN — INSULIN GLARGINE 15 UNITS: 100 INJECTION, SOLUTION SUBCUTANEOUS at 21:59

## 2022-01-04 RX ADMIN — INSULIN LISPRO 2 UNITS: 100 INJECTION, SOLUTION INTRAVENOUS; SUBCUTANEOUS at 18:04

## 2022-01-04 RX ADMIN — POLYETHYLENE GLYCOL 3350 17 G: 17 POWDER, FOR SOLUTION ORAL at 21:58

## 2022-01-04 RX ADMIN — SEVELAMER CARBONATE 800 MG: 800 TABLET, FILM COATED ORAL at 13:33

## 2022-01-04 RX ADMIN — SEVELAMER CARBONATE 800 MG: 800 TABLET, FILM COATED ORAL at 18:05

## 2022-01-04 RX ADMIN — LACTULOSE 15.33 G: 20 SOLUTION ORAL at 21:50

## 2022-01-04 RX ADMIN — HYDRALAZINE HYDROCHLORIDE 100 MG: 50 TABLET, FILM COATED ORAL at 13:34

## 2022-01-04 RX ADMIN — SODIUM CHLORIDE, PRESERVATIVE FREE 10 ML: 5 INJECTION INTRAVENOUS at 22:02

## 2022-01-04 RX ADMIN — LISINOPRIL 20 MG: 20 TABLET ORAL at 21:50

## 2022-01-04 RX ADMIN — LEVOTHYROXINE SODIUM 150 MCG: 75 TABLET ORAL at 06:45

## 2022-01-04 RX ADMIN — METOPROLOL SUCCINATE 100 MG: 50 TABLET, EXTENDED RELEASE ORAL at 06:45

## 2022-01-04 ASSESSMENT — PAIN SCALES - GENERAL
PAINLEVEL_OUTOF10: 0
PAINLEVEL_OUTOF10: 0

## 2022-01-04 NOTE — CONSULTS
Palliative Care:   Pt well known to palliative care team.  She presented today from  with hyperglycemia. Pt is up in chair, alert and oriented. She believes she will be dc'd today with [place] services. Past Medical History:        Past Medical History:   Diagnosis Date    Arthritis     Chronic kidney disease, stage 5, kidney failure (HCC)     Closed fracture of right shoulder girdle, with routine healing, subsequent encounter     DM (diabetes mellitus) type II controlled with renal manifestation ([facility] [address] 06/1993    Gastroparesis     GERD (gastroesophageal reflux disease)     Hemodialysis patient ([facility] [address]     dialysis on tues[facility] thur, and [facility]    Hypertension     Hypothyroid     Nasal congestion     recent    Neuropathy     Noncompliance with medications     Palliative care patient 10/08/2019    Restless leg syndrome        Advance Directives:  Full code, no changes to ACP      Pain/Other Symptoms:   Denies at present     Activity:   As xavier          Psychological/Spiritual:   Pt tells me she has 2 friends and her sister who are supportive. Carey Fay is pt [facility]. Plan:   Pt believes she will dc home with [place] services order is not seen at this visit. Patient/family discussion r/t goals: Pt goal is to return to her home with [place] services. Pt has been at  but will not return there at this time. She is on HD T,TH,S at [facility], transported by [facility]. She tells me she has all DME she needs in the home. States she is able to manage her ADL's with some assist from friends. Will ask for order for [facility], this was discussed with pt. [facility] will review and make appt if qualifies. Pt voiced understanding.     Review of any needed services:    [facility]          Electronically signed by Jayda Perez RN on 1/4/2022 at 1:40 PM

## 2022-01-04 NOTE — PROGRESS NOTES
Physical Therapy  Name: Dian Anthony  MRN:  [id number]  Date of service:  1/4/2022 01/04/22 1600   Restrictions/Precautions   Restrictions/Precautions Fall Risk;Contact Precautions   Subjective   Subjective I thought I was going home but the doc came in and said no because my blood sugar is low. Oxygen Therapy   O2 Device None (Room air)   Transfers   Sit to Stand Stand by assistance   Stand to sit Stand by assistance   Ambulation   Ambulation? Yes   Ambulation 1   Surface level tile   Device Hand-Held Assist   Assistance Contact guard assistance   Quality of Gait GUARDED PACE, NO LOB   Gait Deviations Increased REYNA; Decreased arm swing; Slow Soraya; Shuffles   Distance 75 feet   Short term goals   Time Frame for Short term goals 14 DAYS   Short term goal 1 TRANSFERS INDEPENDENT   Short term goal 2 ' AAD SUP-IND   Conditions Requiring Skilled Therapeutic Intervention   Body structures, Functions, Activity limitations Decreased functional mobility ; Decreased balance;Decreased strength   Assessment Patient did well with ambulation. She self limits due to legs feeling weak . REQUIRES PT FOLLOW UP Yes   Discharge Recommendations Continue to assess pending progress   Activity Tolerance   Activity Tolerance Patient Tolerated treatment well   Plan   Times per week AT LEAST 5-6   Current Treatment Recommendations Strengthening; Functional Mobility Training;Transfer Training;Balance Training;Gait Training; Safety Education & Training;Patient/Caregiver Education & Training   Safety Devices   Type of devices Call light within reach; Left in chair   PT Whiteboard Notes   Therapy Whiteboard RE 1/17  ESRD (to snf)       Electronically signed by Mae Johnson PTA on 1/4/2022 at 4:05 PM

## 2022-01-04 NOTE — PROGRESS NOTES
GM/15ML solution 15.3333 g  15.3333 g Oral BID Glen Chong MD   06.5350 g at 01/03/22 2145    levothyroxine (SYNTHROID) tablet 150 mcg  150 mcg Oral Daily Glen Chong MD   150 mcg at 01/04/22 0645    metoprolol succinate (TOPROL XL) extended release tablet 100 mg  100 mg Oral QAM AC Glen Chong MD   100 mg at 01/04/22 0645    rOPINIRole (REQUIP) tablet 1 mg  1 mg Oral Nightly Glen Chong MD   1 mg at 01/03/22 2144    senna (SENOKOT) tablet 8.6 mg  1 tablet Oral Daily Glen Chong MD   8.6 mg at 01/03/22 1045    sevelamer (RENVELA) tablet 800 mg  800 mg Oral TID  Glen Chong MD   800 mg at 01/03/22 1801    atorvastatin (LIPITOR) tablet 20 mg  20 mg Oral Daily Glen Chong MD   20 mg at 01/03/22 1045    DULoxetine (CYMBALTA) extended release capsule 20 mg  20 mg Oral Daily Glen Chong MD   20 mg at 01/03/22 1045    insulin lispro (HUMALOG) injection vial 0-12 Units  0-12 Units SubCUTAneous TID GIANNA Chong MD   4 Units at 01/03/22 1330    insulin lispro (HUMALOG) injection vial 0-6 Units  0-6 Units SubCUTAneous Nightly Glen Chong MD        glucose (GLUTOSE) 40 % oral gel 15 g  15 g Oral PRN Glen Chong MD        dextrose 50 % IV solution  12.5 g IntraVENous PRN Glen Chong MD        glucagon (rDNA) injection 1 mg  1 mg IntraMUSCular PRN Glen Chong MD        dextrose 5 % solution  100 mL/hr IntraVENous PRN Glen Chong MD        hydrALAZINE (APRESOLINE) injection 5 mg  5 mg IntraVENous Q4H PRN Glen Chong MD   5 mg at 01/02/22 0406    albuterol (PROVENTIL) nebulizer solution 2.5 mg  2.5 mg Nebulization Q6H PRN Glen Chong MD        vitamin D (ERGOCALCIFEROL) capsule 50,000 Units  50,000 Units Oral Weekly Glen Chong MD   50,000 Units at 01/02/22 0612    lisinopril (PRINIVIL;ZESTRIL) tablet 20 mg  20 mg Oral BID Sweta Johnson MD   20 mg at 01/03/22 2144    NIFEdipine (PROCARDIA XL) extended release tablet 90 mg  90 mg Oral Daily Sweta Johnson MD   90 mg at 01/03/22 1045    0.9 % sodium chloride bolus  100 mL IntraVENous PRN Thayer Galeazzi, MD        sodium chloride flush 0.9 % injection 5-40 mL  5-40 mL IntraVENous 2 times per day Sweta Johnson MD   10 mL at 01/03/22 2143    sodium chloride flush 0.9 % injection 5-40 mL  5-40 mL IntraVENous PRN Sweta Johnson MD        0.9 % sodium chloride infusion  25 mL IntraVENous PRN Sweta Johnson MD        ondansetron (ZOFRAN-ODT) disintegrating tablet 4 mg  4 mg Oral Q8H PRN Sweta Johnson MD        Or    ondansetron [facility]) injection 4 mg  4 mg IntraVENous Q6H PRN Sweta Johnson MD   4 mg at 01/02/22 0406    polyethylene glycol (GLYCOLAX) packet 17 g  17 g Oral Daily PRN Sweta Johnson MD        acetaminophen (TYLENOL) tablet 650 mg  650 mg Oral Q6H PRN Sweta Johnson MD        Or   Duana Big acetaminophen (TYLENOL) suppository 650 mg  650 mg Rectal Q6H PRN Sweta Johnson MD           Past Medical History:  Past Medical History:   Diagnosis Date    Arthritis     Chronic kidney disease, stage 5, kidney failure ([facility] [address]     Closed fracture of right shoulder girdle, with routine healing, subsequent encounter     DM (diabetes mellitus) type II controlled with renal manifestation ([facility] [address] 06/1993    Gastroparesis     GERD (gastroesophageal reflux disease)     Hemodialysis patient ([facility] [address]     dialysis on tues, thur, and sat [facility]    Hypertension     Hypothyroid     Nasal congestion     recent    Neuropathy     Noncompliance with medications     Palliative care patient 10/08/2019    Restless leg syndrome        Past Surgical History:  Past Surgical History:   Procedure Laterality Date    BREAST SURGERY Left 2008    infected milk gland removed    BREAST SURGERY Right 5/25/2021    WEDGE EXCISION RIGHT BREAST DUCT WITH LACRIMAL DUCT PROBE, PEC BLOCK performed by Corey Luu MD at [address], LAPAROSCOPIC      2008    COLONOSCOPY Left 9/17/2020    Dr Hemal Marcial hepatic flexure-Severe tortuosity with severe spasming, 1 yr recall    DIALYSIS FISTULA CREATION Left 1/25/2019    REVISION LEFT UPPER EXTREMITY AV ACCESS WITH LEFT BRACHIAL ARTERY TO LEFT AXILLARY VEIN WITH  INTERPOSITIONAL ARTEGRAFT   performed by Angelo Valenzuela MD at [address]  2012    [address] Right 1/25/2019    INSERTION OF RIGHT INTERNAL JUGULAR HEMODIALYSIS CATHETER performed by Angelo Valenzuela MD at [address] [facility]  08/17/2018    1.  Moderately increased stainable iron identified by special stain in Kupffer cells and occasional hepatocytes. Negative for evidence of significant portal or lobular inflammation. Negative for evidence of significant fibrosis    [place] COLONOSCOPY W/BIOPSY SINGLE/MULTIPLE N/A 10/20/2017    Dr Ancemlo Castro prep-Tubular AP (-) dysplasia x 2--3 yr recall    [place] EGD TRANSORAL BIOPSY SINGLE/MULTIPLE N/A 12/27/2016    Dr Ti Calderon EGD TRANSORAL BIOPSY SINGLE/MULTIPLE N/A 10/20/2017    Dr Miriam To (-)   [address] [facility] Left 2016    fistula by Dr Lakeshia Quiros at [address]  10/02/2017    [facility]. Left upper fistulograms/venograms, balloon angioplasty cephalic vein arch 81C14 conquest.    VASCULAR SURGERY  08/2018    FISTULOGRAM    VASCULAR SURGERY  10/29/2018    [facility]. Left upper fistulograms/venograms    VASCULAR SURGERY  12/19/2018    [facility]. Arch aortogram,left upper arteriograms.  VASCULAR SURGERY  03/06/2019    [facility]. Removal of tunneled dialyis catheter right internal jugular vein.  VASCULAR SURGERY  08/28/2019    [facility]. Left upper extremity fistulogram including venography to the superior vena cava. Balloon angioplasty left subclavian vein with 10mm x 40mm conquest balloon. Balloon angioplasty mid/proximal upper arm cephalic vein with 34BV x 40mm conquest balloon. Venograms after balloon angioplasty. Stent left mid/proximal upper arm cephalic vein stenosis fluency 10mm x 60mm self-expanding covered stent. Balloon     VASCULAR SURGERY  2019    cont angioplasty stent with 10mm x 40mm conquest balloon. Completion venograms left upper extremity. Family History  Family History   Problem Relation Age of Onset    Colon Cancer Mother          at 63    Colon Polyps Mother     Lung Cancer Father          at 79    Colon Polyps Father     Stomach Cancer Sister     Breast Cancer Sister 27    Depression Daughter 25        committed suicide    Liver Cancer Neg Hx     Liver Disease Neg Hx     Esophageal Cancer Neg Hx     Rectal Cancer Neg Hx        Social History  Social History     Socioeconomic History    Marital status: Single     Spouse name: Not on file    Number of children: 2    Years of education: Not on file    Highest education level: Not on file   Occupational History    Not on file   Tobacco Use    Smoking status: Current Every Day Smoker     Packs/day: 0.50     Years: 33.00     Pack years: 16.50     Types: Cigarettes    Smokeless tobacco: Never Used    Tobacco comment: NOW at 1/4 PD, started at age 24 and has averaged 2 PPD   Vaping Use    Vaping Use: Never used   Substance and Sexual Activity    Alcohol use: No    Drug use: Not Currently     Types: Marijuana [facility])    Sexual activity: Not Currently     Partners: Male   Other Topics Concern    Not on file   Social History Narrative    Not on file     Social Determinants of Health     Financial Resource Strain:     Difficulty of Paying Living Expenses: Not on file   Food Insecurity:     Worried About Running Out of Food in the Last Year: Not on file    Kendy of Food in the Last Year: Not on file   Transportation Needs:     Lack of Transportation (Medical):  Not on file    Lack of Transportation (Non-Medical): Not on file   Physical Activity:     Days of Exercise per Week: Not on file    Minutes of Exercise per Session: Not on file   Stress:     Feeling of Stress : Not on file   Social Connections:     Frequency of Communication with Friends and Family: Not on file    Frequency of Social Gatherings with Friends and Family: Not on file    Attends [employer] Services: Not on file    Active Member of [address] [place] [address] or Organizations: Not on file    Attends Club or Organization Meetings: Not on file    Marital Status: Not on file   Intimate Partner Violence:     Fear of Current or Ex-Partner: Not on file    Emotionally Abused: Not on file    Physically Abused: Not on file    Sexually Abused: Not on file   Housing Stability:     Unable to Pay for Housing in the Last Year: Not on file    Number of [place] in the Last Year: Not on file    Unstable Housing in the Last Year: Not on file         Review of Systems:  Reviewed with the patient at the bedside, 6 points reviewed and negative except as noted above. Objective:  BP: 209/97   HR: 72  General: awake/alert   Chest:  clear to auscultation bilaterally without respiratory distress  CVS: regular rate and rhythm  Abdominal: soft, nontender, normal bowel sounds  Extremities: no cyanosis or edema  Skin: warm and dry without rash    Labs:  BMP:   Recent Labs     01/02/22  0031 01/02/22  0031 01/02/22  0651 01/03/22 0318 01/04/22  0421   *   < > 126* 132* 132*   K 4.0   < > 3.6 4.3 4.7   CL 83*   < > 88* 94* 94*   CO2 22   < > 21* 25 25   PHOS 4.8*  --   --   --   --    BUN 41*   < > 41* 26* 38*   CREATININE 4.5*   < > 4.7* 3.6* 5.2*   CALCIUM 8.1*   < > 8.3* 8.2* 8.6    < > = values in this interval not displayed.      CBC:   Recent Labs     01/02/22  0209 01/03/22 0318 01/04/22  0235   WBC 4.7* 4.8 4.7*   HGB 8.9* 9.1* 8.5*   HCT 28.4* 29.0* 27.3*   MCV 91.0 89.5 92.2   PLT 84* 106* 111*     LIVER PROFILE:   Recent Labs 01/01/22 2130 01/03/22 0318 01/04/22  0421   AST 20 14 15   ALT 15 10 9   BILITOT 0.6 0.3 0.3   ALKPHOS 118* 84 82     PT/INR:   Recent Labs     01/01/22 2130   PROTIME 13.0   INR 0.96     APTT:   Recent Labs     01/01/22 2130   APTT 29.1     BNP:  No results for input(s): BNP in the last 72 hours. Ionized Calcium:No results for input(s): IONCA in the last 72 hours. Magnesium:  Recent Labs     01/02/22  0209 01/03/22 0318 01/04/22  0235   MG 2.3 2.3 2.5     Phosphorus:  Recent Labs     01/02/22  0031   PHOS 4.8*     HgbA1C: No results for input(s): LABA1C in the last 72 hours. Hepatic:   Recent Labs     01/01/22 2130 01/03/22 0318 01/04/22  0421   ALKPHOS 118* 84 82   ALT 15 10 9   AST 20 14 15   PROT 7.3 5.5* 6.0*   BILITOT 0.6 0.3 0.3   LABALBU 4.2 3.2* 3.3*     Lactic Acid:   Recent Labs     01/01/22 2130   LACTA 0.8     Troponin: No results for input(s): CKTOTAL, CKMB, TROPONINT in the last 72 hours. ABGs: No results for input(s): PH, PCO2, PO2, HCO3, O2SAT in the last 72 hours. CRP:  No results for input(s): CRP in the last 72 hours. Sed Rate:  No results for input(s): SEDRATE in the last 72 hours. Cultures:   No results for input(s): CULTURE in the last 72 hours. Recent Labs     01/01/22 2130 01/01/22  2154   BC No Growth to date. Any change in status will be called. --    BLOODCULT2  --  No Growth to date. Any change in status will be called. No results for input(s): CXSURG in the last 72 hours. Radiology reports as per the Radiologist  Radiology: XR CHEST PORTABLE    Result Date: 1/1/2022  XR CHEST PORTABLE 1/1/2022 9:31 PM HISTORY: Shortness of air COMPARISON: 12/14/2021 CXR: A single frontal view of the chest is performed. Findings: Stable cardiomegaly. Persistent prominence of pulmonary vascular markings as well as interstitial densities. No pleural effusion or pneumothorax identified. Left subclavian vascular stent. No acute regional bony pathology.     1. Cardiomegaly with mild CHF. . Signed by Dr Chris Thorpe     -ESRD  -DM2 with nephropathy on long-term insulin  -HTN  -Hyponatremia  -Hyperkalemia  -Secondary Hyperparathyroidism  -Anemia CKD  -Seizure disorder  -Hypothyroidism    Plan:  Dialysis as above. Continue BP meds. May administer clonidine as needed. If systolic blood pressure is below 180, patient may then be discharged after dialysis and follow-up at her outpatient dialysis unit.     Freeman Sheth MD, MD  01/04/22  11:23 AM

## 2022-01-04 NOTE — PROGRESS NOTES
Progress Note  Date:2022       Room:0306/306-01  Patient Tushar Brown     YOB: 1970     Age:51 y.o. Subjective    Subjective: 76-year-old lady with a history of noncompliance, end-stage renal disease on dialysis, uncontrolled type 2 diabetes noncompliant with insulin, gastroparesis, restless leg syndrome, presented from [facility] nursing [facility] with concerns of fatigue and thirst.  On admission was noted to have glucose of 879, admitted in the ICU on insulin drip, overall improved, transition to subcu insulin and transferred to the medical romo. Patient was seen in house today, denied any acute overnight event, today she slept well, denied any recurrent nausea or emesis. Blood sugar but huge discrepancy and adjustments to be made. Patient has been declined return back to SNF facility, overall participating well with therapy and plan is for home with home health tomorrow. Review of Systems: 12 point system review negative suggested above. Objective         Vitals Last 24 Hours:  TEMPERATURE:  Temp  Av.9 °F (36.6 °C)  Min: 97.4 °F (36.3 °C)  Max: 98.6 °F (37 °C)  RESPIRATIONS RANGE: Resp  Av.6  Min: 16  Max: 20  PULSE OXIMETRY RANGE: SpO2  Av %  Min: 96 %  Max: 99 %  PULSE RANGE: Pulse  Av.6  Min: 65  Max: 70  BLOOD PRESSURE RANGE: Systolic (28LYK), AO , Min:126 , KINGS:788   ; Diastolic (76ZEU), OZ, Min:58, Max:72    I/O (24Hr): No intake or output data in the 24 hours ending 22 1709      Physical Examination:  General: Well-developed, no acute distress lying comfortably in bed.   HEENT: Atraumatic normocephalic, range of motion normal   Cardiac: Normal S1-S2  Respiratory: clear To auscultation bilaterally, no rhonchi or rales, no wheezing  Abdomen: Soft, positive bowel sounds in all quadrants, no distention, nontender to palpation  Extremities: no tenderness, no edema, moves all extremities  Psych: Affect normal and good eye contact, behavioral normal.        Labs/Imaging/Diagnostics    Labs:  CBC:  Recent Labs     01/02/22 0209 01/03/22 0318 01/04/22 0235   WBC 4.7* 4.8 4.7*   RBC 3.12* 3.24* 2.96*   HGB 8.9* 9.1* 8.5*   HCT 28.4* 29.0* 27.3*   MCV 91.0 89.5 92.2   RDW 14.4 14.7* 14.8*   PLT 84* 106* 111*     CHEMISTRIES:  Recent Labs     01/02/22 0031 01/02/22  0031 01/02/22 0209 01/02/22 0209 01/02/22  0651 01/03/22 0318 01/04/22 0235 01/04/22 0421   *  --   --    < > 126* 132*  --  132*   K 4.0  --   --    < > 3.6 4.3  --  4.7   CL 83*  --   --    < > 88* 94*  --  94*   CO2 22  --   --    < > 21* 25  --  25   BUN 41*  --   --    < > 41* 26*  --  38*   CREATININE 4.5*  --   --    < > 4.7* 3.6*  --  5.2*   GLUCOSE 745*   < > 607*   < > 122* 226*  --  72*   PHOS 4.8*  --   --   --   --   --   --   --    MG  --   --  2.3  --   --  2.3 2.5  --     < > = values in this interval not displayed. PT/INR:  Recent Labs     01/01/22 2130   PROTIME 13.0   INR 0.96     APTT:  Recent Labs     01/01/22 2130   APTT 29.1     LIVER PROFILE:  Recent Labs     01/01/22 2130 01/03/22 0318 01/04/22 0421   AST 20 14 15   ALT 15 10 9   BILITOT 0.6 0.3 0.3   ALKPHOS 118* 84 82       Imaging Last 24 Hours:  XR CHEST PORTABLE    Result Date: 1/1/2022  XR CHEST PORTABLE 1/1/2022 9:31 PM HISTORY: Shortness of air COMPARISON: 12/14/2021 CXR: A single frontal view of the chest is performed. Findings: Stable cardiomegaly. Persistent prominence of pulmonary vascular markings as well as interstitial densities. No pleural effusion or pneumothorax identified. Left subclavian vascular stent. No acute regional bony pathology. 1. Cardiomegaly with mild CHF. . Signed by Dr Otto Mott    Assessment//Plan           Hospital Problems           Last Modified POA    Hyperglycemia 1/1/2022 Yes        Assessment & Plan      Uncontrolled type 2 diabetes with noncompliance to insulin  Transition of insulin drip currently on subcu  Insulin regimen still being adjusted, continue as currently ordered  Hypoglycemia protocol  Reiterated importance of compliance to medical regimen. End-stage renal disease on dialysis  Nephrology following for in-house dialysis session. Tobacco use disorder  Counseled on cessation    Hypertension  Continue clonidine patch, hydralazine, Imdur, lisinopril, nifedipine    Restless leg syndrome: Continue Requip    Gastroparesis: Secondary to noncompliance to regimen with uncontrolled diabetes. Hypothyroidism: Continue Synthroid.       Code: Full  Diet: Diabetic/renal  Disposition: Home with home health tomorrow        Electronically signed by Missy Spencer MD on 1/3/22 at 5:20 PM CST

## 2022-01-04 NOTE — PROGRESS NOTES
Physical Therapy  Name: Ed Hernandez  MRN:  [id number]  Date of service:  1/4/2022 01/04/22 0900   General   Missed reason Conflicting appointment  (out for dailysis)       Electronically signed by Siena Rodriguez PTA on 1/4/2022 at 9:00 AM

## 2022-01-05 VITALS
OXYGEN SATURATION: 96 % | HEIGHT: 66 IN | SYSTOLIC BLOOD PRESSURE: 197 MMHG | BODY MASS INDEX: 29.11 KG/M2 | WEIGHT: 181.13 LBS | RESPIRATION RATE: 18 BRPM | TEMPERATURE: 97.3 F | DIASTOLIC BLOOD PRESSURE: 80 MMHG | HEART RATE: 73 BPM

## 2022-01-05 LAB
ALBUMIN SERPL-MCNC: 3.7 G/DL (ref 3.5–5.2)
ALP BLD-CCNC: 107 U/L (ref 35–104)
ALT SERPL-CCNC: 23 U/L (ref 5–33)
ANION GAP SERPL CALCULATED.3IONS-SCNC: 13 MMOL/L (ref 7–19)
AST SERPL-CCNC: 58 U/L (ref 5–32)
BASOPHILS ABSOLUTE: 0.1 K/UL (ref 0–0.2)
BASOPHILS RELATIVE PERCENT: 1.9 % (ref 0–1)
BILIRUB SERPL-MCNC: 0.3 MG/DL (ref 0.2–1.2)
BUN BLDV-MCNC: 23 MG/DL (ref 6–20)
CALCIUM SERPL-MCNC: 8.4 MG/DL (ref 8.6–10)
CHLORIDE BLD-SCNC: 96 MMOL/L (ref 98–111)
CO2: 27 MMOL/L (ref 22–29)
CREAT SERPL-MCNC: 3.9 MG/DL (ref 0.5–0.9)
EOSINOPHILS ABSOLUTE: 0.3 K/UL (ref 0–0.6)
EOSINOPHILS RELATIVE PERCENT: 6.5 % (ref 0–5)
GFR AFRICAN AMERICAN: 15
GFR NON-AFRICAN AMERICAN: 12
GLUCOSE BLD-MCNC: 105 MG/DL (ref 70–99)
GLUCOSE BLD-MCNC: 117 MG/DL (ref 70–99)
GLUCOSE BLD-MCNC: 187 MG/DL (ref 70–99)
GLUCOSE BLD-MCNC: 43 MG/DL (ref 70–99)
GLUCOSE BLD-MCNC: 82 MG/DL (ref 74–109)
HCT VFR BLD CALC: 28 % (ref 37–47)
HEMOGLOBIN: 8.7 G/DL (ref 12–16)
IMMATURE GRANULOCYTES #: 0 K/UL
LYMPHOCYTES ABSOLUTE: 1.3 K/UL (ref 1.1–4.5)
LYMPHOCYTES RELATIVE PERCENT: 29 % (ref 20–40)
MAGNESIUM: 2.4 MG/DL (ref 1.6–2.6)
MCH RBC QN AUTO: 28.8 PG (ref 27–31)
MCHC RBC AUTO-ENTMCNC: 31.1 G/DL (ref 33–37)
MCV RBC AUTO: 92.7 FL (ref 81–99)
MONOCYTES ABSOLUTE: 0.5 K/UL (ref 0–0.9)
MONOCYTES RELATIVE PERCENT: 12.3 % (ref 0–10)
NEUTROPHILS ABSOLUTE: 2.2 K/UL (ref 1.5–7.5)
NEUTROPHILS RELATIVE PERCENT: 50.1 % (ref 50–65)
PDW BLD-RTO: 14.8 % (ref 11.5–14.5)
PERFORMED ON: ABNORMAL
PLATELET # BLD: 96 K/UL (ref 130–400)
PMV BLD AUTO: 9.9 FL (ref 9.4–12.3)
POTASSIUM SERPL-SCNC: 4.2 MMOL/L (ref 3.5–5)
RBC # BLD: 3.02 M/UL (ref 4.2–5.4)
SODIUM BLD-SCNC: 136 MMOL/L (ref 136–145)
TOTAL PROTEIN: 5.8 G/DL (ref 6.6–8.7)
WBC # BLD: 4.3 K/UL (ref 4.8–10.8)

## 2022-01-05 PROCEDURE — 83735 ASSAY OF MAGNESIUM: CPT

## 2022-01-05 PROCEDURE — 6370000000 HC RX 637 (ALT 250 FOR IP): Performed by: HOSPITALIST

## 2022-01-05 PROCEDURE — 2580000003 HC RX 258: Performed by: HOSPITALIST

## 2022-01-05 PROCEDURE — 85025 COMPLETE CBC W/AUTO DIFF WBC: CPT

## 2022-01-05 PROCEDURE — 80053 COMPREHEN METABOLIC PANEL: CPT

## 2022-01-05 PROCEDURE — 6360000002 HC RX W HCPCS: Performed by: HOSPITALIST

## 2022-01-05 PROCEDURE — 36415 COLL VENOUS BLD VENIPUNCTURE: CPT

## 2022-01-05 PROCEDURE — 97116 GAIT TRAINING THERAPY: CPT

## 2022-01-05 PROCEDURE — 82947 ASSAY GLUCOSE BLOOD QUANT: CPT

## 2022-01-05 PROCEDURE — 6370000000 HC RX 637 (ALT 250 FOR IP): Performed by: INTERNAL MEDICINE

## 2022-01-05 RX ORDER — INSULIN GLARGINE 100 [IU]/ML
15 INJECTION, SOLUTION SUBCUTANEOUS 2 TIMES DAILY
Qty: 5 PEN | Refills: 3
Start: 2022-01-05 | End: 2022-01-10

## 2022-01-05 RX ORDER — MINOXIDIL 2.5 MG/1
2.5 TABLET ORAL DAILY
Status: DISCONTINUED | OUTPATIENT
Start: 2022-01-05 | End: 2022-01-05 | Stop reason: HOSPADM

## 2022-01-05 RX ADMIN — HYDRALAZINE HYDROCHLORIDE 5 MG: 20 INJECTION INTRAMUSCULAR; INTRAVENOUS at 01:28

## 2022-01-05 RX ADMIN — HYDRALAZINE HYDROCHLORIDE 100 MG: 50 TABLET, FILM COATED ORAL at 13:33

## 2022-01-05 RX ADMIN — NIFEDIPINE 90 MG: 30 TABLET, EXTENDED RELEASE ORAL at 08:20

## 2022-01-05 RX ADMIN — SEVELAMER CARBONATE 800 MG: 800 TABLET, FILM COATED ORAL at 11:16

## 2022-01-05 RX ADMIN — HYDRALAZINE HYDROCHLORIDE 100 MG: 50 TABLET, FILM COATED ORAL at 06:38

## 2022-01-05 RX ADMIN — MINOXIDIL 2.5 MG: 2.5 TABLET ORAL at 11:16

## 2022-01-05 RX ADMIN — LACTULOSE 15.33 G: 20 SOLUTION ORAL at 11:16

## 2022-01-05 RX ADMIN — SODIUM CHLORIDE, PRESERVATIVE FREE 10 ML: 5 INJECTION INTRAVENOUS at 08:22

## 2022-01-05 RX ADMIN — SEVELAMER CARBONATE 800 MG: 800 TABLET, FILM COATED ORAL at 08:03

## 2022-01-05 RX ADMIN — DULOXETINE HYDROCHLORIDE 20 MG: 20 CAPSULE, DELAYED RELEASE ORAL at 08:21

## 2022-01-05 RX ADMIN — LEVOTHYROXINE SODIUM 150 MCG: 75 TABLET ORAL at 06:38

## 2022-01-05 RX ADMIN — ISOSORBIDE MONONITRATE 120 MG: 60 TABLET, EXTENDED RELEASE ORAL at 08:20

## 2022-01-05 RX ADMIN — METOPROLOL SUCCINATE 100 MG: 50 TABLET, EXTENDED RELEASE ORAL at 06:38

## 2022-01-05 RX ADMIN — LISINOPRIL 20 MG: 20 TABLET ORAL at 08:21

## 2022-01-05 RX ADMIN — STANDARDIZED SENNA CONCENTRATE 8.6 MG: 8.6 TABLET ORAL at 08:21

## 2022-01-05 RX ADMIN — ATORVASTATIN CALCIUM 20 MG: 20 TABLET, FILM COATED ORAL at 08:21

## 2022-01-05 ASSESSMENT — PAIN DESCRIPTION - LOCATION: LOCATION: ABDOMEN

## 2022-01-05 ASSESSMENT — PAIN SCALES - WONG BAKER: WONGBAKER_NUMERICALRESPONSE: 4

## 2022-01-05 ASSESSMENT — PAIN - FUNCTIONAL ASSESSMENT: PAIN_FUNCTIONAL_ASSESSMENT: ACTIVITIES ARE NOT PREVENTED

## 2022-01-05 ASSESSMENT — PAIN DESCRIPTION - FREQUENCY: FREQUENCY: CONTINUOUS

## 2022-01-05 ASSESSMENT — PAIN DESCRIPTION - DESCRIPTORS: DESCRIPTORS: ACHING

## 2022-01-05 ASSESSMENT — PAIN DESCRIPTION - PAIN TYPE: TYPE: ACUTE PAIN

## 2022-01-05 NOTE — DISCHARGE SUMMARY
Discharge Summary      Date:1/5/2022        Patient Geoff Chao     YOB: 1970     Age:51 y.o. Admit Date:1/1/2022   Admission Condition:fair   Discharged Condition:stable  Discharge Date: 01/05/22       Discharge Diagnoses   Active Problems:    Hyperglycemia  Resolved Problems:    * No resolved hospital problems. [facility] Stay   Narrative of Hospital Course:     24-year-old lady with a history of noncompliance, end-stage renal disease on dialysis, uncontrolled type 2 diabetes noncompliant with insulin, gastroparesis, restless leg syndrome, presented from [facility] with concerns of fatigue and thirst. On admission was noted to have glucose of 879, admitted in the ICU on insulin drip, overall improved, transition to subcu insulin and transferred to the medical romo. Patient with controlled blood sugars whaen actually complaint to medications. Noted to have significant lows in house and insulin regimen were adjusted on discharge. The importance of adherence was stressed with this patient but highly have doubts that she will follow through with what needs to be donne. Patient ambulating independently and does not qualify for return to rehab at this time. She was set up for home health, to follow up with PCP for continuous management of chronic medical problems. Highly predict she will likely be back in the ED in less than 2-3 weeks due to non compliance. Physical Examination:  General: Well-developed, no acute distress lying comfortably in bed.   HEENT: Atraumatic normocephalic, range of motion normal   Cardiac: Normal S1-S2  Respiratory: clear To auscultation bilaterally, no rhonchi or rales, no wheezing  Abdomen: Soft, positive bowel sounds in all quadrants, no distention, nontender to palpation  Extremities: no tenderness, no edema, moves all extremities  Psych: Affect normal and good eye contact, behavioral normal.        Consultants:   IP CONSULT into the skin 3 times daily Indications: Diabetes  What changed: how much to take        CONTINUE taking these medications    albuterol sulfate  (90 Base) MCG/ACT inhaler     cloNIDine 0.1 MG/24HR Ptwk  Commonly known as: CATAPRES  Place 1 patch onto the skin once a week     DULoxetine 60 MG extended release capsule  Commonly known as: CYMBALTA     hydrALAZINE 100 MG tablet  Commonly known as: APRESOLINE  Take 1 tablet by mouth every 8 hours     HYDROcodone-acetaminophen 5-325 MG per tablet  Commonly known as: Norco  Take 1 tablet by mouth every 6 hours as needed for Pain.     isosorbide mononitrate 120 MG extended release tablet  Commonly known as: IMDUR  Take 1 tablet by mouth daily     lactulose 10 GM/15ML solution  Commonly known as: CHRONULAC     levothyroxine 150 MCG tablet  Commonly known as: SYNTHROID     lisinopril 20 MG tablet  Commonly known as: PRINIVIL;ZESTRIL  Take 1 tablet by mouth 2 times daily     nicotine 14 MG/24HR  Commonly known as: NICODERM CQ  Place 1 patch onto the skin daily     NIFEdipine 30 MG extended release tablet  Commonly known as: PROCARDIA XL  Take 3 tablets by mouth daily     rOPINIRole 2 MG tablet  Commonly known as: REQUIP     Senna-Lax 8.6 MG tablet  Generic drug: senna     sevelamer 800 MG tablet  Commonly known as: RENVELA     simvastatin 40 MG tablet  Commonly known as: ZOCOR     Vitamin D2 10 MCG (400 UNIT) Tabs        STOP taking these medications    cefUROXime 250 MG tablet  Commonly known as: CEFTIN     hydroCHLOROthiazide 25 MG tablet  Commonly known as: HYDRODIURIL     minoxidil 2.5 MG tablet  Commonly known as: LONITEN           Where to Get Your Medications      Information about where to get these medications is not yet available    Ask your nurse or doctor about these medications  · Basaglar KwikPen 100 UNIT/ML injection pen         Electronically signed by Paz Feng MD on 1/5/22 at 9:57 AM CST

## 2022-01-05 NOTE — PROGRESS NOTES
Physical Therapy  Name: Darby Boss  MRN:  [id number]  Date of service:  1/5/2022 01/05/22 0839   Restrictions/Precautions   Restrictions/Precautions Fall Risk;Contact Precautions   Position Activity Restriction   Other position/activity restrictions VRE   Subjective   Subjective Pt agreed to therapy, reported her stomach hurting. Pain Screening   Patient Currently in Pain Yes   Pain Assessment   Pain Assessment Faces   [place] Pain Rating 4   Pain Type Acute pain   Pain Location Abdomen   Pain Descriptors Aching   Pain Frequency Continuous   Functional Pain Assessment Activities are not prevented   Non-Pharmaceutical Pain Intervention(s) Ambulation/Increased Activity;Repositioned; Rest   Response to Pain Intervention Patient Satisfied   Bed Mobility   Supine to Sit Stand by assistance   Sit to Supine Stand by assistance   Transfers   Sit to Stand Stand by assistance   Stand to sit Stand by assistance   Ambulation   Ambulation? Yes   Ambulation 1   Surface level tile   Device Rolling Walker   Assistance Contact guard assistance   Quality of Gait GUARDED PACE, NO LOB   Gait Deviations Slow Soraya;Decreased step length;Decreased step height   Distance [de-identified]'   Comments pt reported feeling weak but did not have LOB   Short term goals   Time Frame for Short term goals 14 DAYS   Short term goal 1 TRANSFERS INDEPENDENT   Short term goal 2 ' AAD SUP-IND   Conditions Requiring Skilled Therapeutic Intervention   Body structures, Functions, Activity limitations Decreased functional mobility ; Decreased balance;Decreased strength   Assessment Pt did well amb with RW, no LOB but reported she felt weak. Pt did not want to amb out of room and wanted to return to bed at this time. All needs in reach. Activity Tolerance   Activity Tolerance Patient Tolerated treatment well   Safety Devices   Type of devices Bed alarm in place;Call light within reach; Left in bed       Electronically signed by Chetna Velasquez PTA on 1/5/2022 at 9:39 AM

## 2022-01-05 NOTE — CARE COORDINATION
Spoke with patient regarding MD orders for [place] services. Patient agreeable and has chosen [facility]. Referral Faxed. [address] [place] [address] [phone]. [phone]. Please notify [facility] when patient discharges and fax DC Summary,  DC med list and any new [place] orders. The Patient and/or patient representative was provided with a choice of provider and agrees   with the discharge plan. [x] Yes [] No    Freedom of choice list was provided with basic dialogue that supports the patient's individualized plan of care/goals, treatment preferences and shares the quality data associated with the providers.  [x] Yes [] No  Electronically signed by Roger Justin on 1/5/2022 at 1:24 PM

## 2022-01-05 NOTE — PROGRESS NOTES
BID Sheldon Blanco MD   20 mg at 01/05/22 6749    NIFEdipine (PROCARDIA XL) extended release tablet 90 mg  90 mg Oral Daily Sheldon Blanco MD   90 mg at 01/05/22 0820    0.9 % sodium chloride bolus  100 mL IntraVENous PRN Aubrey Pope MD        sodium chloride flush 0.9 % injection 5-40 mL  5-40 mL IntraVENous 2 times per day Sheldon Blanco MD   10 mL at 01/05/22 6936    sodium chloride flush 0.9 % injection 5-40 mL  5-40 mL IntraVENous PRN Sheldon Blanco MD        0.9 % sodium chloride infusion  25 mL IntraVENous PRN Sheldon Blanco MD        ondansetron (ZOFRAN-ODT) disintegrating tablet 4 mg  4 mg Oral Q8H PRN Sheldon Blanco MD        Or    [facility]ans[facility]ron [facility] injection 4 mg  4 mg IntraVENous Q6H PRN Sheldon Blanco MD   4 mg at 01/02/22 0406    polyethylene glycol (GLYCOLAX) packet 17 g  17 g Oral Daily PRN Sheldon Blanco MD   17 g at 01/04/22 2158    acetaminophen (TYLENOL) tablet 650 mg  650 mg Oral Q6H PRN Sheldon Blanco MD        Or   Altagracia Current acetaminophen (TYLENOL) suppository 650 mg  650 mg Rectal Q6H PRN Sheldon Blanco MD           Past Medical History:  Past Medical History:   Diagnosis Date    Arthritis     Chronic kidney disease, stage 5, kidney failure [address]     Closed fracture of right shoulder girdle, with routine healing, subsequent encounter     DM (diabetes mellitus) type II controlled with renal manifestation ([facility] 06/1993    Gastroparesis     GERD (gastroesophageal reflux disease)     Hemodialysis patient ([facility]     dialysis on tues, thur, and sat at [facility]    Hypertension     Hypothyroid     Nasal congestion     recent    Neuropathy     Noncompliance with medications     Palliative care patient 10/08/2019    Restless leg syndrome        Past Surgical History:  Past Surgical History:   Procedure Laterality Date    BREAST SURGERY Left 2008    infected milk gland removed  BREAST SURGERY Right 5/25/2021    WEDGE EXCISION RIGHT BREAST DUCT WITH LACRIMAL DUCT PROBE, PEC BLOCK performed by Disha Soni MD at [address], LAPAROSCOPIC      2008    COLONOSCOPY Left 9/17/2020    Dr Migdalia Ramey hepatic flexure-Severe tortuosity with severe spasming, 1 yr recall    DIALYSIS FISTULA CREATION Left 1/25/2019    REVISION LEFT UPPER EXTREMITY AV ACCESS WITH LEFT BRACHIAL ARTERY TO LEFT AXILLARY VEIN WITH  INTERPOSITIONAL ARTEGRAFT   performed by Ximena Carey MD at [address]  2012    [address] Right 1/25/2019    INSERTION OF RIGHT INTERNAL JUGULAR HEMODIALYSIS CATHETER performed by Ximena Carey MD at [facility]  08/17/2018    1.  Moderately increased stainable iron identified by special stain in Kupffer cells and occasional hepatocytes. Negative for evidence of significant portal or lobular inflammation. Negative for evidence of significant fibrosis    PA COLONOSCOPY W/BIOPSY SINGLE/MULTIPLE N/A 10/20/2017    Dr Darryle Kussmaul prep-Tubular AP (-) dysplasia x 2--3 yr recall    PA EGD TRANSORAL BIOPSY SINGLE/MULTIPLE N/A 12/27/2016    Dr Evin Werner EGD TRANSORAL BIOPSY SINGLE/MULTIPLE N/A 10/20/2017    Dr Mickiel Primrose (-)   [address] [facility] VASCULAR SURGERY Left 2016    fistula by Dr Joseph Mcgee at [address]  10/02/2017    [facility]. Left upper fistulograms/venograms, balloon angioplasty cephalic vein arch 83V78 conquest.    VASCULAR SURGERY  08/2018    FISTULOGRAM    VASCULAR SURGERY  10/29/2018    [facility]. Left upper fistulograms/venograms    VASCULAR SURGERY  12/19/2018    [facility]. Arch aortogram,left upper arteriograms.  VASCULAR SURGERY  03/06/2019    [facility]. Removal of tunneled dialyis catheter right internal jugular vein.  VASCULAR SURGERY  08/28/2019    [facility]. Left upper extremity fistulogram including venography to the superior vena cava. Balloon angioplasty left subclavian vein with 10mm x 40mm conquest balloon. Balloon angioplasty mid/proximal upper arm cephalic vein with 07KS x 40mm conquest balloon. Venograms after balloon angioplasty. Stent left mid/proximal upper arm cephalic vein stenosis fluency 10mm x 60mm self-expanding covered stent. Balloon     VASCULAR SURGERY  2019    cont angioplasty stent with 10mm x 40mm conquest balloon. Completion venograms left upper extremity. Family History  Family History   Problem Relation Age of Onset    Colon Cancer Mother          at 63    Colon Polyps Mother     Lung Cancer Father          at 79    Colon Polyps Father     Stomach Cancer Sister     Breast Cancer Sister 27    Depression Daughter 25        committed suicide    Liver Cancer Neg Hx     Liver Disease Neg Hx     Esophageal Cancer Neg Hx     Rectal Cancer Neg Hx        Social History  Social History     Socioeconomic History    Marital status: Single     Spouse name: Not on file    Number of children: 2    Years of education: Not on file    Highest education level: Not on file   Occupational History    Not on file   Tobacco Use    Smoking status: Current Every Day Smoker     Packs/day: 0.50     Years: 33.00     Pack years: 16.50     Types: Cigarettes    Smokeless tobacco: Never Used    Tobacco comment: NOW at 1/4 PD, started at age 24 and has averaged 2 PPD   Vaping Use    Vaping Use: Never used   Substance and Sexual Activity    Alcohol use: No    Drug use: Not Currently     Types: Marijuana [facility])    Sexual activity: Not Currently     Partners: Male   Other Topics Concern    Not on file   Social History Narrative    Not on file     Social Determinants of Health     Financial Resource Strain:     Difficulty of Paying Living Expenses: Not on file   Food Insecurity:     Worried About Running Out of Food in the Last Year: Not on file    Kendy of Food in the Last Year: Not on file   Transportation Needs:     Lack of Transportation (Medical):  Not on file    Lack of Transportation (Non-Medical): Not on file   Physical Activity:     Days of Exercise per Week: Not on file    Minutes of Exercise per Session: Not on file   Stress:     Feeling of Stress : Not on file   Social Connections:     Frequency of Communication with Friends and Family: Not on file    Frequency of Social Gatherings with Friends and Family: Not on file    Attends [employer] Services: Not on file    Active Member of [address] [place] [address] or Organizations: Not on file    Attends Club or Organization Meetings: Not on file    Marital Status: Not on file   Intimate Partner Violence:     Fear of Current or Ex-Partner: Not on file    Emotionally Abused: Not on file    Physically Abused: Not on file    Sexually Abused: Not on file   Housing Stability:     Unable to Pay for Housing in the Last Year: Not on file    Number of [place] in the Last Year: Not on file    Unstable Housing in the Last Year: Not on file         Review of Systems:  History obtained from chart review and the patient  General ROS: No fever or chills  Respiratory ROS: No cough, shortness of breath, wheezing  Cardiovascular ROS: no chest pain or dyspnea on exertion  Gastrointestinal ROS: No abdominal pain or melena  Genito-Urinary ROS: No dysuria or hematuria  Musculoskeletal ROS: No joint pain or swelling         Objective:  Blood pressure (!) 201/78, pulse 71, temperature 99 °F (37.2 °C), temperature source Temporal, resp. rate 16, height 5' 6\" (1.676 m), weight 181 lb 2 oz (82.2 kg), SpO2 97 %.     Intake/Output Summary (Last 24 hours) at 1/5/2022 1102  Last data filed at 1/5/2022 1014  Gross per 24 hour   Intake 360 ml   Output 200 ml   Net 160 ml     General: alert and oriented x3   Chest:  clear to auscultation bilaterally without respiratory distress  CVS: regular rate and rhythm  Abdominal: soft, nontender, normal bowel sounds  Extremities: no cyanosis or edema  Skin: warm and dry without rash    Labs:  BMP:   Recent Labs 01/03/22 0318 01/04/22 0421 01/05/22  0144   * 132* 136   K 4.3 4.7 4.2   CL 94* 94* 96*   CO2 25 25 27   BUN 26* 38* 23*   CREATININE 3.6* 5.2* 3.9*   CALCIUM 8.2* 8.6 8.4*     CBC:   Recent Labs     01/03/22 0318 01/04/22 0235 01/05/22 0144   WBC 4.8 4.7* 4.3*   HGB 9.1* 8.5* 8.7*   HCT 29.0* 27.3* 28.0*   MCV 89.5 92.2 92.7   * 111* 96*     LIVER PROFILE:   Recent Labs     01/03/22 0318 01/04/22 0421 01/05/22 0144   AST 14 15 58*   ALT 10 9 23   BILITOT 0.3 0.3 0.3   ALKPHOS 84 82 107*     PT/INR:   No results for input(s): PROTIME, INR in the last 72 hours. APTT:   No results for input(s): APTT in the last 72 hours. BNP:  No results for input(s): BNP in the last 72 hours. Ionized Calcium:No results for input(s): IONCA in the last 72 hours. Magnesium:  Recent Labs     01/03/22 0318 01/04/22 0235 01/05/22 0144   MG 2.3 2.5 2.4     Phosphorus:  No results for input(s): PHOS in the last 72 hours. HgbA1C: No results for input(s): LABA1C in the last 72 hours. Hepatic:   Recent Labs     01/03/22 0318 01/04/22 0421 01/05/22  0144   ALKPHOS 84 82 107*   ALT 10 9 23   AST 14 15 58*   PROT 5.5* 6.0* 5.8*   BILITOT 0.3 0.3 0.3   LABALBU 3.2* 3.3* 3.7     Lactic Acid:   No results for input(s): LACTA in the last 72 hours. Troponin: No results for input(s): CKTOTAL, CKMB, TROPONINT in the last 72 hours. ABGs: No results for input(s): PH, PCO2, PO2, HCO3, O2SAT in the last 72 hours. CRP:  No results for input(s): CRP in the last 72 hours. Sed Rate:  No results for input(s): SEDRATE in the last 72 hours. Cultures:   No results for input(s): CULTURE in the last 72 hours. Radiology reports as per the Radiologist  Radiology: XR CHEST PORTABLE    Result Date: 1/1/2022  XR CHEST PORTABLE 1/1/2022 9:31 PM HISTORY: Shortness of air COMPARISON: 12/14/2021 CXR: A single frontal view of the chest is performed. Findings: Stable cardiomegaly.  Persistent prominence of pulmonary vascular markings as well as interstitial densities. No pleural effusion or pneumothorax identified. Left subclavian vascular stent. No acute regional bony pathology. 1. Cardiomegaly with mild CHF. . Signed by Dr Garrett Macedo   -ESRD  -DM2 with nephropathy on long-term insulin  -HTN  -Hyponatremia  -Hyperkalemia  -Secondary Hyperparathyroidism  -Anemia CKD  -Seizure disorder  -Hypothyroidism  -Severe hypertension      Plan:  Dialysis is due again on 1/6. Follow up labs. We will add oral minoxidil to her blood pressure medication regimen.

## 2022-01-06 ENCOUNTER — TELEPHONE (OUTPATIENT)
Dept: PALLATIVE CARE | Age: 52
End: 2022-01-06

## 2022-01-06 NOTE — TELEPHONE ENCOUNTER
I called MsEvangelina Beatrice Morris to discuss the Supportive Care referral we received while she was in the hospital. I left a voicemail and I will try to call her back tomorrow to discuss. I can be reached at [phone].

## 2022-01-07 LAB
BLOOD CULTURE, ROUTINE: NORMAL
CULTURE, BLOOD 2: NORMAL

## 2022-01-10 ENCOUNTER — HOSPITAL ENCOUNTER (OUTPATIENT)
Age: 52
Setting detail: OBSERVATION
Discharge: HOME HEALTH | End: 2022-01-12
Attending: EMERGENCY MEDICINE
Payer: MEDICARE

## 2022-01-10 ENCOUNTER — APPOINTMENT (OUTPATIENT)
Dept: GENERAL RADIOLOGY | Age: 52
End: 2022-01-10
Payer: MEDICARE

## 2022-01-10 DIAGNOSIS — Z99.2 ESRD ON DIALYSIS (HCC): ICD-10-CM

## 2022-01-10 DIAGNOSIS — E11.649 TYPE 2 DIABETES MELLITUS WITH HYPOGLYCEMIA WITHOUT COMA, WITH LONG-TERM CURRENT USE OF INSULIN (HCC): Primary | ICD-10-CM

## 2022-01-10 DIAGNOSIS — Z79.4 TYPE 2 DIABETES MELLITUS WITH HYPOGLYCEMIA WITHOUT COMA, WITH LONG-TERM CURRENT USE OF INSULIN (HCC): Primary | ICD-10-CM

## 2022-01-10 DIAGNOSIS — N18.6 ESRD ON DIALYSIS (HCC): ICD-10-CM

## 2022-01-10 LAB
ALBUMIN SERPL-MCNC: 3.9 G/DL (ref 3.5–5.2)
ALP BLD-CCNC: 98 U/L (ref 35–104)
ALT SERPL-CCNC: 14 U/L (ref 5–33)
ANION GAP SERPL CALCULATED.3IONS-SCNC: 19 MMOL/L (ref 7–19)
AST SERPL-CCNC: 23 U/L (ref 5–32)
BACTERIA: ABNORMAL /HPF
BASOPHILS ABSOLUTE: 0.1 K/UL (ref 0–0.2)
BASOPHILS RELATIVE PERCENT: 1.7 % (ref 0–1)
BILIRUB SERPL-MCNC: 0.3 MG/DL (ref 0.2–1.2)
BILIRUBIN URINE: NEGATIVE
BLOOD, URINE: ABNORMAL
BUN BLDV-MCNC: 35 MG/DL (ref 6–20)
CALCIUM SERPL-MCNC: 8.9 MG/DL (ref 8.6–10)
CHLORIDE BLD-SCNC: 95 MMOL/L (ref 98–111)
CLARITY: ABNORMAL
CO2: 24 MMOL/L (ref 22–29)
COLOR: YELLOW
CREAT SERPL-MCNC: 4 MG/DL (ref 0.5–0.9)
EKG P AXIS: 61 DEGREES
EKG P-R INTERVAL: 98 MS
EKG Q-T INTERVAL: 500 MS
EKG QRS DURATION: 118 MS
EKG QTC CALCULATION (BAZETT): 499 MS
EKG T AXIS: 117 DEGREES
EOSINOPHILS ABSOLUTE: 0.3 K/UL (ref 0–0.6)
EOSINOPHILS RELATIVE PERCENT: 6.8 % (ref 0–5)
EPITHELIAL CELLS, UA: ABNORMAL /HPF
GFR AFRICAN AMERICAN: 14
GFR NON-AFRICAN AMERICAN: 12
GLUCOSE BLD-MCNC: 107 MG/DL (ref 70–99)
GLUCOSE BLD-MCNC: 110 MG/DL
GLUCOSE BLD-MCNC: 110 MG/DL (ref 70–99)
GLUCOSE BLD-MCNC: 116 MG/DL
GLUCOSE BLD-MCNC: 116 MG/DL (ref 70–99)
GLUCOSE BLD-MCNC: 202 MG/DL (ref 70–99)
GLUCOSE BLD-MCNC: 315 MG/DL (ref 70–99)
GLUCOSE BLD-MCNC: 331 MG/DL (ref 70–99)
GLUCOSE BLD-MCNC: 49 MG/DL (ref 70–99)
GLUCOSE BLD-MCNC: 58 MG/DL
GLUCOSE BLD-MCNC: 58 MG/DL (ref 70–99)
GLUCOSE BLD-MCNC: 67 MG/DL (ref 74–109)
GLUCOSE URINE: 250 MG/DL
HCT VFR BLD CALC: 27.5 % (ref 37–47)
HEMOGLOBIN: 8.1 G/DL (ref 12–16)
IMMATURE GRANULOCYTES #: 0 K/UL
KETONES, URINE: NEGATIVE MG/DL
LEUKOCYTE ESTERASE, URINE: ABNORMAL
LYMPHOCYTES ABSOLUTE: 1.6 K/UL (ref 1.1–4.5)
LYMPHOCYTES RELATIVE PERCENT: 33.1 % (ref 20–40)
MAGNESIUM: 2.4 MG/DL (ref 1.6–2.6)
MCH RBC QN AUTO: 28.6 PG (ref 27–31)
MCHC RBC AUTO-ENTMCNC: 29.5 G/DL (ref 33–37)
MCV RBC AUTO: 97.2 FL (ref 81–99)
MONOCYTES ABSOLUTE: 0.6 K/UL (ref 0–0.9)
MONOCYTES RELATIVE PERCENT: 12.7 % (ref 0–10)
NEUTROPHILS ABSOLUTE: 2.1 K/UL (ref 1.5–7.5)
NEUTROPHILS RELATIVE PERCENT: 45.5 % (ref 50–65)
NITRITE, URINE: NEGATIVE
PDW BLD-RTO: 14.9 % (ref 11.5–14.5)
PERFORMED ON: ABNORMAL
PH UA: 6 (ref 5–8)
PLATELET # BLD: 150 K/UL (ref 130–400)
PMV BLD AUTO: 9.2 FL (ref 9.4–12.3)
POTASSIUM REFLEX MAGNESIUM: 3 MMOL/L (ref 3.5–5)
PROTEIN UA: 300 MG/DL
RBC # BLD: 2.83 M/UL (ref 4.2–5.4)
RBC UA: ABNORMAL /HPF (ref 0–2)
SARS-COV-2, NAAT: NOT DETECTED
SODIUM BLD-SCNC: 138 MMOL/L (ref 136–145)
SPECIFIC GRAVITY UA: 1.01 (ref 1–1.03)
TOTAL PROTEIN: 6.9 G/DL (ref 6.6–8.7)
UROBILINOGEN, URINE: 0.2 E.U./DL
WBC # BLD: 4.7 K/UL (ref 4.8–10.8)
WBC UA: ABNORMAL /HPF (ref 0–5)

## 2022-01-10 PROCEDURE — 87077 CULTURE AEROBIC IDENTIFY: CPT

## 2022-01-10 PROCEDURE — G0378 HOSPITAL OBSERVATION PER HR: HCPCS

## 2022-01-10 PROCEDURE — 6360000002 HC RX W HCPCS: Performed by: INTERNAL MEDICINE

## 2022-01-10 PROCEDURE — 87635 SARS-COV-2 COVID-19 AMP PRB: CPT

## 2022-01-10 PROCEDURE — 81001 URINALYSIS AUTO W/SCOPE: CPT

## 2022-01-10 PROCEDURE — 93010 ELECTROCARDIOGRAM REPORT: CPT | Performed by: INTERNAL MEDICINE

## 2022-01-10 PROCEDURE — 82947 ASSAY GLUCOSE BLOOD QUANT: CPT

## 2022-01-10 PROCEDURE — 87040 BLOOD CULTURE FOR BACTERIA: CPT

## 2022-01-10 PROCEDURE — 87086 URINE CULTURE/COLONY COUNT: CPT

## 2022-01-10 PROCEDURE — 71045 X-RAY EXAM CHEST 1 VIEW: CPT

## 2022-01-10 PROCEDURE — 83735 ASSAY OF MAGNESIUM: CPT

## 2022-01-10 PROCEDURE — 2580000003 HC RX 258: Performed by: INTERNAL MEDICINE

## 2022-01-10 PROCEDURE — 2580000003 HC RX 258: Performed by: EMERGENCY MEDICINE

## 2022-01-10 PROCEDURE — 36415 COLL VENOUS BLD VENIPUNCTURE: CPT

## 2022-01-10 PROCEDURE — 99285 EMERGENCY DEPT VISIT HI MDM: CPT

## 2022-01-10 PROCEDURE — 85025 COMPLETE CBC W/AUTO DIFF WBC: CPT

## 2022-01-10 PROCEDURE — 96372 THER/PROPH/DIAG INJ SC/IM: CPT

## 2022-01-10 PROCEDURE — 96375 TX/PRO/DX INJ NEW DRUG ADDON: CPT

## 2022-01-10 PROCEDURE — 96366 THER/PROPH/DIAG IV INF ADDON: CPT

## 2022-01-10 PROCEDURE — 96365 THER/PROPH/DIAG IV INF INIT: CPT

## 2022-01-10 PROCEDURE — 93005 ELECTROCARDIOGRAM TRACING: CPT | Performed by: EMERGENCY MEDICINE

## 2022-01-10 PROCEDURE — 87186 SC STD MICRODIL/AGAR DIL: CPT

## 2022-01-10 PROCEDURE — 80053 COMPREHEN METABOLIC PANEL: CPT

## 2022-01-10 PROCEDURE — 2500000003 HC RX 250 WO HCPCS: Performed by: EMERGENCY MEDICINE

## 2022-01-10 PROCEDURE — 6370000000 HC RX 637 (ALT 250 FOR IP): Performed by: INTERNAL MEDICINE

## 2022-01-10 RX ORDER — HYDRALAZINE HYDROCHLORIDE 50 MG/1
100 TABLET, FILM COATED ORAL EVERY 8 HOURS SCHEDULED
Status: DISCONTINUED | OUTPATIENT
Start: 2022-01-10 | End: 2022-01-12 | Stop reason: HOSPADM

## 2022-01-10 RX ORDER — LISINOPRIL 20 MG/1
20 TABLET ORAL 2 TIMES DAILY
Status: DISCONTINUED | OUTPATIENT
Start: 2022-01-10 | End: 2022-01-12 | Stop reason: HOSPADM

## 2022-01-10 RX ORDER — LEVOTHYROXINE SODIUM 0.07 MG/1
150 TABLET ORAL DAILY
Status: DISCONTINUED | OUTPATIENT
Start: 2022-01-10 | End: 2022-01-12 | Stop reason: HOSPADM

## 2022-01-10 RX ORDER — ROPINIROLE 4 MG/1
4 TABLET, FILM COATED ORAL NIGHTLY
Status: DISCONTINUED | OUTPATIENT
Start: 2022-01-10 | End: 2022-01-12 | Stop reason: HOSPADM

## 2022-01-10 RX ORDER — ACETAMINOPHEN 325 MG/1
650 TABLET ORAL EVERY 6 HOURS PRN
Status: DISCONTINUED | OUTPATIENT
Start: 2022-01-10 | End: 2022-01-12 | Stop reason: HOSPADM

## 2022-01-10 RX ORDER — SODIUM CHLORIDE 0.9 % (FLUSH) 0.9 %
5-40 SYRINGE (ML) INJECTION PRN
Status: DISCONTINUED | OUTPATIENT
Start: 2022-01-10 | End: 2022-01-12 | Stop reason: HOSPADM

## 2022-01-10 RX ORDER — NIFEDIPINE 90 MG/1
90 TABLET, EXTENDED RELEASE ORAL DAILY
Status: DISCONTINUED | OUTPATIENT
Start: 2022-01-10 | End: 2022-01-12 | Stop reason: HOSPADM

## 2022-01-10 RX ORDER — DULOXETIN HYDROCHLORIDE 60 MG/1
60 CAPSULE, DELAYED RELEASE ORAL DAILY
Status: DISCONTINUED | OUTPATIENT
Start: 2022-01-10 | End: 2022-01-12 | Stop reason: HOSPADM

## 2022-01-10 RX ORDER — SODIUM CHLORIDE 0.9 % (FLUSH) 0.9 %
5-40 SYRINGE (ML) INJECTION EVERY 12 HOURS SCHEDULED
Status: DISCONTINUED | OUTPATIENT
Start: 2022-01-10 | End: 2022-01-12 | Stop reason: HOSPADM

## 2022-01-10 RX ORDER — ISOSORBIDE MONONITRATE 60 MG/1
120 TABLET, EXTENDED RELEASE ORAL DAILY
Status: DISCONTINUED | OUTPATIENT
Start: 2022-01-10 | End: 2022-01-12 | Stop reason: HOSPADM

## 2022-01-10 RX ORDER — ACETAMINOPHEN 650 MG/1
650 SUPPOSITORY RECTAL EVERY 6 HOURS PRN
Status: DISCONTINUED | OUTPATIENT
Start: 2022-01-10 | End: 2022-01-12 | Stop reason: HOSPADM

## 2022-01-10 RX ORDER — DEXTROSE MONOHYDRATE 25 G/50ML
25 INJECTION, SOLUTION INTRAVENOUS ONCE
Status: COMPLETED | OUTPATIENT
Start: 2022-01-10 | End: 2022-01-10

## 2022-01-10 RX ORDER — HYDROCODONE BITARTRATE AND ACETAMINOPHEN 5; 325 MG/1; MG/1
1 TABLET ORAL EVERY 6 HOURS PRN
Status: DISCONTINUED | OUTPATIENT
Start: 2022-01-10 | End: 2022-01-12 | Stop reason: HOSPADM

## 2022-01-10 RX ORDER — ONDANSETRON 4 MG/1
4 TABLET, ORALLY DISINTEGRATING ORAL EVERY 8 HOURS PRN
Status: DISCONTINUED | OUTPATIENT
Start: 2022-01-10 | End: 2022-01-12 | Stop reason: HOSPADM

## 2022-01-10 RX ORDER — POLYETHYLENE GLYCOL 3350 17 G/17G
17 POWDER, FOR SOLUTION ORAL DAILY PRN
Status: DISCONTINUED | OUTPATIENT
Start: 2022-01-10 | End: 2022-01-12 | Stop reason: HOSPADM

## 2022-01-10 RX ORDER — SODIUM CHLORIDE 9 MG/ML
25 INJECTION, SOLUTION INTRAVENOUS PRN
Status: DISCONTINUED | OUTPATIENT
Start: 2022-01-10 | End: 2022-01-12 | Stop reason: HOSPADM

## 2022-01-10 RX ORDER — NICOTINE 21 MG/24HR
1 PATCH, TRANSDERMAL 24 HOURS TRANSDERMAL DAILY
Status: DISCONTINUED | OUTPATIENT
Start: 2022-01-10 | End: 2022-01-12 | Stop reason: HOSPADM

## 2022-01-10 RX ORDER — DEXTROSE MONOHYDRATE 100 MG/ML
INJECTION, SOLUTION INTRAVENOUS CONTINUOUS
Status: DISCONTINUED | OUTPATIENT
Start: 2022-01-10 | End: 2022-01-10

## 2022-01-10 RX ORDER — CLONIDINE 0.1 MG/24H
1 PATCH, EXTENDED RELEASE TRANSDERMAL WEEKLY
Status: DISCONTINUED | OUTPATIENT
Start: 2022-01-10 | End: 2022-01-12 | Stop reason: HOSPADM

## 2022-01-10 RX ORDER — ERGOCALCIFEROL 1.25 MG/1
50000 CAPSULE ORAL
Status: DISCONTINUED | OUTPATIENT
Start: 2022-01-10 | End: 2022-01-12 | Stop reason: HOSPADM

## 2022-01-10 RX ORDER — ALBUTEROL SULFATE 2.5 MG/3ML
2.5 SOLUTION RESPIRATORY (INHALATION) EVERY 4 HOURS PRN
Status: DISCONTINUED | OUTPATIENT
Start: 2022-01-10 | End: 2022-01-12 | Stop reason: HOSPADM

## 2022-01-10 RX ORDER — ALBUTEROL SULFATE 90 UG/1
2 AEROSOL, METERED RESPIRATORY (INHALATION) EVERY 4 HOURS PRN
Status: DISCONTINUED | OUTPATIENT
Start: 2022-01-10 | End: 2022-01-10

## 2022-01-10 RX ORDER — HYDRALAZINE HYDROCHLORIDE 20 MG/ML
10 INJECTION INTRAMUSCULAR; INTRAVENOUS EVERY 6 HOURS PRN
Status: DISCONTINUED | OUTPATIENT
Start: 2022-01-10 | End: 2022-01-12 | Stop reason: HOSPADM

## 2022-01-10 RX ORDER — ONDANSETRON 2 MG/ML
4 INJECTION INTRAMUSCULAR; INTRAVENOUS EVERY 6 HOURS PRN
Status: DISCONTINUED | OUTPATIENT
Start: 2022-01-10 | End: 2022-01-12 | Stop reason: HOSPADM

## 2022-01-10 RX ORDER — DEXTROSE AND SODIUM CHLORIDE 5; .45 G/100ML; G/100ML
INJECTION, SOLUTION INTRAVENOUS CONTINUOUS
Status: DISCONTINUED | OUTPATIENT
Start: 2022-01-10 | End: 2022-01-10

## 2022-01-10 RX ORDER — METOPROLOL SUCCINATE 50 MG/1
150 TABLET, EXTENDED RELEASE ORAL
Status: DISCONTINUED | OUTPATIENT
Start: 2022-01-10 | End: 2022-01-12 | Stop reason: HOSPADM

## 2022-01-10 RX ORDER — SENNA PLUS 8.6 MG/1
1 TABLET ORAL DAILY
Status: DISCONTINUED | OUTPATIENT
Start: 2022-01-10 | End: 2022-01-12 | Stop reason: HOSPADM

## 2022-01-10 RX ORDER — ATORVASTATIN CALCIUM 20 MG/1
20 TABLET, FILM COATED ORAL DAILY
Status: DISCONTINUED | OUTPATIENT
Start: 2022-01-10 | End: 2022-01-12 | Stop reason: HOSPADM

## 2022-01-10 RX ORDER — SEVELAMER CARBONATE 800 MG/1
800 TABLET, FILM COATED ORAL
Status: DISCONTINUED | OUTPATIENT
Start: 2022-01-10 | End: 2022-01-12 | Stop reason: HOSPADM

## 2022-01-10 RX ORDER — LACTULOSE 10 G/15ML
15 SOLUTION ORAL 3 TIMES DAILY
Status: DISCONTINUED | OUTPATIENT
Start: 2022-01-10 | End: 2022-01-12 | Stop reason: HOSPADM

## 2022-01-10 RX ADMIN — DEXTROSE MONOHYDRATE 25 G: 25 INJECTION, SOLUTION INTRAVENOUS at 04:21

## 2022-01-10 RX ADMIN — LACTULOSE 15.33 G: 20 SOLUTION ORAL at 22:32

## 2022-01-10 RX ADMIN — HYDRALAZINE HYDROCHLORIDE 100 MG: 50 TABLET, FILM COATED ORAL at 07:52

## 2022-01-10 RX ADMIN — SEVELAMER CARBONATE 800 MG: 800 TABLET, FILM COATED ORAL at 18:44

## 2022-01-10 RX ADMIN — NIFEDIPINE 90 MG: 90 TABLET, FILM COATED, EXTENDED RELEASE ORAL at 11:53

## 2022-01-10 RX ADMIN — ROPINIROLE HYDROCHLORIDE 4 MG: 4 TABLET, FILM COATED ORAL at 22:32

## 2022-01-10 RX ADMIN — SODIUM CHLORIDE, PRESERVATIVE FREE 10 ML: 5 INJECTION INTRAVENOUS at 10:00

## 2022-01-10 RX ADMIN — ISOSORBIDE MONONITRATE 120 MG: 60 TABLET, EXTENDED RELEASE ORAL at 09:54

## 2022-01-10 RX ADMIN — ATORVASTATIN CALCIUM 20 MG: 20 TABLET, FILM COATED ORAL at 09:54

## 2022-01-10 RX ADMIN — LISINOPRIL 20 MG: 20 TABLET ORAL at 09:55

## 2022-01-10 RX ADMIN — DEXTROSE MONOHYDRATE: 10 INJECTION, SOLUTION INTRAVENOUS at 11:19

## 2022-01-10 RX ADMIN — SEVELAMER CARBONATE 800 MG: 800 TABLET, FILM COATED ORAL at 11:20

## 2022-01-10 RX ADMIN — HYDRALAZINE HYDROCHLORIDE 100 MG: 50 TABLET, FILM COATED ORAL at 14:22

## 2022-01-10 RX ADMIN — LISINOPRIL 20 MG: 20 TABLET ORAL at 22:32

## 2022-01-10 RX ADMIN — LEVOTHYROXINE SODIUM 150 MCG: 75 TABLET ORAL at 07:52

## 2022-01-10 RX ADMIN — SENNOSIDES 8.6 MG: 8.6 TABLET, COATED ORAL at 09:55

## 2022-01-10 RX ADMIN — ENOXAPARIN SODIUM 30 MG: 100 INJECTION SUBCUTANEOUS at 09:55

## 2022-01-10 RX ADMIN — DULOXETINE 60 MG: 60 CAPSULE, DELAYED RELEASE ORAL at 09:55

## 2022-01-10 RX ADMIN — ERGOCALCIFEROL 50000 UNITS: 1.25 CAPSULE ORAL at 09:55

## 2022-01-10 RX ADMIN — EPOETIN ALFA-EPBX 10000 UNITS: 10000 INJECTION, SOLUTION INTRAVENOUS; SUBCUTANEOUS at 11:19

## 2022-01-10 RX ADMIN — METOPROLOL SUCCINATE 150 MG: 50 TABLET, EXTENDED RELEASE ORAL at 09:59

## 2022-01-10 RX ADMIN — DEXTROSE AND SODIUM CHLORIDE: 5; 450 INJECTION, SOLUTION INTRAVENOUS at 18:43

## 2022-01-10 RX ADMIN — SEVELAMER CARBONATE 800 MG: 800 TABLET, FILM COATED ORAL at 07:52

## 2022-01-10 RX ADMIN — INSULIN LISPRO 2 UNITS: 100 INJECTION, SOLUTION INTRAVENOUS; SUBCUTANEOUS at 23:20

## 2022-01-10 ASSESSMENT — ENCOUNTER SYMPTOMS
DIARRHEA: 0
EYE REDNESS: 0
COUGH: 0
SHORTNESS OF BREATH: 0
ABDOMINAL PAIN: 0
VOICE CHANGE: 0
RHINORRHEA: 0
VOMITING: 0
EYE PAIN: 0

## 2022-01-10 ASSESSMENT — PAIN SCALES - GENERAL: PAINLEVEL_OUTOF10: 0

## 2022-01-10 NOTE — ED PROVIDER NOTES
[facility]  EMERGENCY DEPARTMENT ENCOUNTER      Pt Name: Olga Mackey  MRN: [id number]  [place] 1970  Date of evaluation: 1/10/2022  Provider: Suyapa Yousif MD    CHIEF COMPLAINT       Chief Complaint   Patient presents with    Hypoglycemia     Patient bs on EMS arrival was 23. Patient given 150 of D10 by EMS. Last EMS bs was 101. HISTORY OF PRESENT ILLNESS   (Location/Symptom, Timing/Onset,Context/Setting, Quality, Duration, Modifying Factors, Severity)  Note limiting factors. Olga Mackey is a 45 y.o. female who presents to the emergency department for evaluation after she was found to have low blood glucose this evening. Per ems pt had been somnolent throughout the day and her BG was found to be in the 50s. She was given D10 en route with ems with improvement in BG to >100 by the time of arrival. Pt has a h/o T2DM with long term insulin use with multiple prior hospitalizations for hyperglycemia and medical noncompliance. Reports she is staying at home now and no longer at the nursing home. States she believes her last insulin dose was around 11. Says she has been eating and drinking ok with no vomiting or diarrhea. Pt notes she felt weak and tired all day but says she didn't feel too bad. Says she initially thought her meter was broken when it read 57. Has ESRD and says last dialysis was on Saturday and everything went fine. HPI    NursingNotes were reviewed. REVIEW OF SYSTEMS    (2-9 systems for level 4, 10 or more for level 5)     Review of Systems   Constitutional: Positive for fatigue. Negative for fever. HENT: Negative for congestion, rhinorrhea and voice change. Eyes: Negative for pain and redness. Respiratory: Negative for cough and shortness of breath. Cardiovascular: Negative for chest pain. Gastrointestinal: Negative for abdominal pain, diarrhea and vomiting. Endocrine: Negative. Genitourinary: Negative.     Musculoskeletal: Negative for arthralgias and gait problem. Skin: Negative for rash and wound. Neurological: Positive for weakness. Negative for headaches. Hematological: Negative. Psychiatric/Behavioral: Negative. All other systems reviewed and are negative. A complete review of systems was performed and is negative except as noted above in the HPI.        PAST MEDICAL HISTORY     Past Medical History:   Diagnosis Date    Arthritis     Chronic kidney disease, stage 5, kidney failure ([facility] [facility]     Closed fracture of right shoulder girdle, with routine healing, subsequent encounter     DM (diabetes mellitus) type II controlled with renal manifestation ([facility] [address] 06/1993    Gastroparesis     GERD (gastroesophageal reflux disease)     Hemodialysis patient ([facility] [address]     dialysis on tues, thur, and sat at [facility] clinic    Hypertension     Hypothyroid     Nasal congestion     recent    Neuropathy     Noncompliance with medications     Palliative care patient 10/08/2019    Restless leg syndrome          SURGICAL HISTORY       Past Surgical History:   Procedure Laterality Date    BREAST SURGERY Left 2008    infected milk gland removed    BREAST SURGERY Right 5/25/2021    WEDGE EXCISION RIGHT BREAST DUCT WITH LACRIMAL DUCT PROBE, PEC BLOCK performed by Galina Lawson MD at [address] [place] [address]      2008    COLONOSCOPY Left 9/17/2020    Dr Rosario Benoit hepatic flexure-Severe tortuosity with severe spasming, 1 yr recall    DIALYSIS FISTULA CREATION Left 1/25/2019    REVISION LEFT UPPER EXTREMITY AV ACCESS WITH LEFT BRACHIAL ARTERY TO LEFT AXILLARY VEIN WITH  INTERPOSITIONAL ARTEGRAFT   performed by Mary Daley MD at [address]  2012    [address] Right 1/25/2019    INSERTION OF RIGHT INTERNAL JUGULAR HEMODIALYSIS CATHETER performed by Mary Daley MD at [address] [place] [address]  08/17/2018    1.  Moderately increased stainable iron identified by special stain in Kupffer cells and occasional hepatocytes. Negative for evidence of significant portal or lobular inflammation. Negative for evidence of significant fibrosis    RI COLONOSCOPY W/BIOPSY SINGLE/MULTIPLE N/A 10/20/2017    Dr Davian Frederick prep-Tubular AP (-) dysplasia x 2--3 yr recall    RI EGD TRANSORAL BIOPSY SINGLE/MULTIPLE N/A 12/27/2016    Dr Ashtyn Victoria EGD TRANSORAL BIOPSY SINGLE/MULTIPLE N/A 10/20/2017    Dr Maribel Chacon (-)   [address] [facility] VASCULAR SURGERY Left 2016    fistula by Dr Edilma Tian at [address]  10/02/2017    [facility]. Left upper fistulograms/venograms, balloon angioplasty cephalic vein arch [id number] conquest.    VASCULAR SURGERY  08/2018    FISTULOGRAM    VASCULAR SURGERY  10/29/2018    [facility]. Left upper fistulograms/venograms    VASCULAR SURGERY  12/19/2018    [facility]. Arch aortogram,left upper arteriograms.  VASCULAR SURGERY  03/06/2019    [facility]. Removal of tunneled dialyis catheter right internal jugular vein.  VASCULAR SURGERY  08/28/2019    [facility]. Left upper extremity fistulogram including venography to the superior vena cava. Balloon angioplasty left subclavian vein with 10mm x 40mm conquest balloon. Balloon angioplasty mid/proximal upper arm cephalic vein with 54KY x 40mm conquest balloon. Venograms after balloon angioplasty. Stent left mid/proximal upper arm cephalic vein stenosis fluency 10mm x 60mm self-expanding covered stent. Balloon     VASCULAR SURGERY  08/28/2019    cont angioplasty stent with 10mm x 40mm conquest balloon. Completion venograms left upper extremity.          CURRENT MEDICATIONS       Previous Medications    ALBUTEROL SULFATE  (90 BASE) MCG/ACT INHALER    Inhale 2 puffs into the lungs every 4 hours as needed    CLONIDINE (CATAPRES) 0.1 MG/24HR PTWK    Place 1 patch onto the skin once a week    DULOXETINE (CYMBALTA) 60 MG EXTENDED RELEASE CAPSULE    Take 60 mg by mouth daily    ERGOCALCIFEROL (VITAMIN D2) 10 MCG (400 UNIT) TABS    Take 1.25 mg by mouth every 14 days    HYDRALAZINE (APRESOLINE) 100 MG TABLET    Take 1 tablet by mouth every 8 hours    HYDROCODONE-ACETAMINOPHEN (NORCO) 5-325 MG PER TABLET    Take 1 tablet by mouth every 6 hours as needed for Pain.     ISOSORBIDE MONONITRATE (IMDUR) 120 MG EXTENDED RELEASE TABLET    Take 1 tablet by mouth daily    LACTULOSE (CHRONULAC) 10 GM/15ML SOLUTION    Take 15 g by mouth 3 times daily With meals for constipation, hold for loose stools    LEVOTHYROXINE (SYNTHROID) 150 MCG TABLET    Take 150 mcg by mouth Daily    LISINOPRIL (PRINIVIL;ZESTRIL) 20 MG TABLET    Take 1 tablet by mouth 2 times daily    METOPROLOL SUCCINATE (TOPROL XL) 100 MG EXTENDED RELEASE TABLET    Take 1 tablet by mouth every morning (before breakfast)    NICOTINE (NICODERM CQ) 14 MG/24HR    Place 1 patch onto the skin daily    NIFEDIPINE (PROCARDIA XL) 30 MG EXTENDED RELEASE TABLET    Take 3 tablets by mouth daily    ROPINIROLE (REQUIP) 2 MG TABLET    Take 4 mg by mouth nightly Indications: Restless Leg Syndrome     SENNA (SENNA-LAX) 8.6 MG TABLET    Take 1 tablet by mouth daily    SEVELAMER (RENVELA) 800 MG TABLET    Take 1 tablet by mouth 3 times daily (with meals)    SIMVASTATIN (ZOCOR) 40 MG TABLET    Take 40 mg by mouth nightly        ALLERGIES     Penicillins, Lamisil advanced [terbinafine], Reglan [metoclopramide], and Stadol [butorphanol]    FAMILY HISTORY       Family History   Problem Relation Age of Onset    Colon Cancer Mother          at 61    Colon Polyps Mother     Lung Cancer Father          at 66    Colon Polyps Father     Stomach Cancer Sister     Breast Cancer Sister 27    Depression Daughter 25        committed suicide    Liver Cancer Neg Hx     Liver Disease Neg Hx     Esophageal Cancer Neg Hx     Rectal Cancer Neg Hx           SOCIAL HISTORY       Social History     Socioeconomic History    Marital status: Single     Spouse name: None    Number of children: 2    Years of education: None    Highest education level: None   Occupational History    None   Tobacco Use    Smoking status: Current Every Day Smoker     Packs/day: 0.50     Years: 33.00     Pack years: 16.50     Types: Cigarettes    Smokeless tobacco: Never Used    Tobacco comment: NOW at 1/4 PD, started at age 24 and has averaged 2 PPD   Vaping Use    Vaping Use: Never used   Substance and Sexual Activity    Alcohol use: No    Drug use: Not Currently     Types: Marijuana [place] Terencee)    Sexual activity: Not Currently     Partners: Male   Other Topics Concern    None   Social History Narrative    None     Social Determinants of Health     Financial Resource Strain:     Difficulty of Paying Living Expenses: Not on file   Food Insecurity:     Worried About Running Out of Food in the Last Year: Not on file    Kendy of Food in the Last Year: Not on file   Transportation Needs:     Lack of Transportation (Medical): Not on file    Lack of Transportation (Non-Medical):  Not on file   Physical Activity:     Days of Exercise per Week: Not on file    Minutes of Exercise per Session: Not on file   Stress:     Feeling of Stress : Not on file   Social Connections:     Frequency of Communication with Friends and Family: Not on file    Frequency of Social Gatherings with Friends and Family: Not on file    Attends Yarsanism Services: Not on file    Active Member of [address] [place] [address] or Organizations: Not on file    Attends Club or Organization Meetings: Not on file    Marital Status: Not on file   Intimate Partner Violence:     Fear of Current or Ex-Partner: Not on file    Emotionally Abused: Not on file    Physically Abused: Not on file    Sexually Abused: Not on file   Housing Stability:     Unable to Pay for Housing in the Last Year: Not on file    Number of [place] in the Last Year: Not on file    Unstable Housing in the Last Year: Not on file       SCREENINGS             PHYSICAL EXAM    (up to 7 for level 4, 8 or more for level 5)     ED Triage Vitals [01/10/22 0242]   BP Temp Temp Source Pulse Resp SpO2 Height Weight   (!) 149/67 96.7 °F (35.9 °C) Infrared 62 11 95 % 5' 6\" (1.676 m) 174 lb 9.7 oz (79.2 kg)       Physical Exam  Vitals and nursing note reviewed. Constitutional:       General: She is not in acute distress. Appearance: She is well-developed. She is not toxic-appearing or diaphoretic. Comments: Pt somnolent and slowed but no focal neuro deficits. Answers questions appropriately and is easily arousable. HENT:      Head: Normocephalic and atraumatic. Eyes:      General: No scleral icterus. Right eye: No discharge. Left eye: No discharge. Pupils: Pupils are equal, round, and reactive to light. Cardiovascular:      Rate and Rhythm: Normal rate and regular rhythm. Pulmonary:      Effort: Pulmonary effort is normal. No respiratory distress. Breath sounds: No stridor. Abdominal:      General: There is no distension. Musculoskeletal:         General: No deformity. Normal range of motion. Cervical back: Normal range of motion. Skin:     General: Skin is warm and dry. Neurological:      Mental Status: She is alert and oriented to person, place, and time. GCS: GCS eye subscore is 3. GCS verbal subscore is 5. GCS motor subscore is 6. Cranial Nerves: No cranial nerve deficit. Motor: No abnormal muscle tone. Psychiatric:         Behavior: Behavior is slowed. Thought Content:  Thought content normal.         Judgment: Judgment normal.         DIAGNOSTIC RESULTS     EKG: All EKG's are interpreted by the Emergency Department Physician who either signs or Co-signs this chart in the absence of a cardiologist.      RADIOLOGY:   Non-plain film images such as CT, Ultrasound and MRI are read by the radiologist. Plainradiographic images are visualized and preliminarily interpreted by the emergency physician with the below findings:        Interpretation per the Radiologist below, if available at the time of this note:    XR CHEST PORTABLE    (Results Pending)         ED BEDSIDE ULTRASOUND:   Performed by ED Physician - none    LABS:  Labs Reviewed   CBC WITH AUTO DIFFERENTIAL - Abnormal; Notable for the following components:       Result Value    WBC 4.7 (*)     RBC 2.83 (*)     Hemoglobin 8.1 (*)     Hematocrit 27.5 (*)     MCHC 29.5 (*)     RDW 14.9 (*)     MPV 9.2 (*)     Neutrophils % 45.5 (*)     Monocytes % 12.7 (*)     Eosinophils % 6.8 (*)     Basophils % 1.7 (*)     All other components within normal limits   COMPREHENSIVE METABOLIC PANEL W/ REFLEX TO MG FOR LOW K - Abnormal; Notable for the following components:    Potassium reflex Magnesium 3.0 (*)     Chloride 95 (*)     Glucose 67 (*)     BUN 35 (*)     CREATININE 4.0 (*)     GFR Non- 12 (*)     GFR  14 (*)     All other components within normal limits   POCT GLUCOSE - Abnormal; Notable for the following components:    POC Glucose 58 (*)     All other components within normal limits   POCT GLUCOSE - Abnormal; Notable for the following components:    POC Glucose 49 (*)     All other components within normal limits   POCT GLUCOSE - Abnormal; Notable for the following components:    POC Glucose 116 (*)     All other components within normal limits   POCT GLUCOSE - Normal   POCT GLUCOSE - Normal   COVID-19, RAPID   MAGNESIUM   URINE RT REFLEX TO CULTURE       All other labs were within normal range or not returned as of this dictation.     EMERGENCY DEPARTMENT COURSE and DIFFERENTIALDIAGNOSIS/MDM:   Vitals:    Vitals:    01/10/22 0242 01/10/22 0425 01/10/22 0527   BP: (!) 149/67 131/73 (!) 122/47   Pulse: 62 57 54   Resp: 11 9 12   Temp: 96.7 °F (35.9 °C)     TempSrc: Infrared     SpO2: 95% 99% 98%   Weight: 174 lb 9.7 oz (79.2 kg)     Height: 5' 6\" (1.676 m)         MDM  ED Course as of 01/10/22 0600   Mon Chris 10, 2022   0332 Glucose: 58 [DIANNE]   0332 D10 restarted after BG on arrival in the 50s again after it had initially improved with D10 from EMS. [DIANNE]   0340 EKG shows sinus rhythm with a rate of 60. NM interval borderline at 202. Evidence of LVH with partial left bundle branch block and repolarization abnormality most notable anteriorly. QT interval borderline at 517. [DIANNE]   0400 Glucose(!): 67 [DIANNE]      ED Course User Index  [DIANNE] Waylon Reeder MD     No signs of underlying infection, cardiac ischemia, or other clear etiology for hypoglycemia. Pt reports compliance with medical regimen. Based on the evaluation and work-up here patient is felt to require further monitoring, work-up, or treatment that is available in the emergency department. Case was discussed with hospitalist who agrees for observation or admission for further management. Treatment and stabilization as necessary were provided in the emergency department prior to transfer of care to the medicine service. CONSULTS:  IP CONSULT TO NEPHROLOGY    PROCEDURES:  Unless otherwise notedbelow, none     Procedures  CRITICAL CARE TIME   Total Critical Care time was 40 minutes, excluding separately reportable procedures. There was a high probability of clinically significant/life threatening deterioration in the patient's condition which required my urgent intervention. FINAL IMPRESSION     1. Type 2 diabetes mellitus with hypoglycemia without coma, with long-term current use of insulin ([facility] [address]    2. ESRD on dialysis [facility])          DISPOSITION/PLAN   DISPOSITION Admitted 01/10/2022 05:40:25 AM      PATIENT REFERRED TO:  No follow-up provider specified.     DISCHARGE MEDICATIONS:  New Prescriptions    No medications on file          (Please note that portions of this note were completed with a voice recognition program.  Efforts were made to edit the dictations butoccasionally words are mis-transcribed.)    Waylon Sandra MD (electronically signed)  Emergency Physician          Waylon Reeder MD  01/10/22 [address] Fabiana Rossi MD  01/10/22 0600

## 2022-01-10 NOTE — PROGRESS NOTES
Kimberly Hernandez arrived to room # 311. Presented with: hypoglycemia   Mental Status: Patient is oriented and alert. Vitals:    01/10/22 0606   BP: (!) 142/63   Pulse:    Resp:    Temp:    SpO2:      Patient safety contract and falls prevention contract reviewed with patient Yes. Oriented Patient to room. Call light within reach. Yes.   Needs, issues or concerns expressed at this time: no.      Electronically signed by Fatmata Hernandez RN on 1/10/2022 at 6:51 AM

## 2022-01-10 NOTE — PROGRESS NOTES
4 Eyes Skin Assessment    Andi Juarez is being assessed upon: Admission    I agree that I, Sofya Davidson RN, along with Jessie Valdovinos RN (either 2 RN's or 1 LPN and 1 RN) have performed a thorough Head to Toe Skin Assessment on the patient. ALL assessment sites listed below have been assessed. Areas assessed by both nurses:     [x]   Head, Face, and Ears   [x]   Shoulders, Back, and Chest  [x]   Arms, Elbows, and Hands   [x]   Coccyx, Sacrum, and Ischium  [x]   Legs, Feet, and Heels    Does the Patient have Skin Breakdown? No    Marek Prevention initiated: No  Wound Care Orders initiated: No    WOC nurse consulted for Pressure Injury (Stage 3,4, Unstageable, DTI, NWPT, and Complex wounds) and New or Established Ostomies: No        Primary Nurse eSignature:  Sofya Davidson RN on 1/10/2022 at 6:52 AM      Co-Signer eSignature: {Esignature:[id number]}

## 2022-01-10 NOTE — H&P
HISTORY AND PHYSICAL             Date: 1/10/2022        Patient Name: Wojciech Martinez     YOB: 1970      Age:  45 y.o. Chief Complaint     Chief Complaint   Patient presents with    Hypoglycemia     Patient bs on EMS arrival was 23. Patient given 150 of D10 by EMS. Last EMS bs was 101. She is being readmitted for hypoglycemia  Blood sugars while in er have remained low and now on d10 drip   She has had repeated repeated admissions for various reasons at least two to three times per month. Admitted late December  Then just admitted jan 1 thru 5   She is being admitted less than 5 days later for hypoglycemia instead of hyperglycemia. History Obtained From   Patient     History of Present Illness   She states she has been compliant with meds. On the exact same regimen has been terrifically hyperglycemic with blood sugars in the 800 to 900 range. She may have taken more than prescribed or took it twice at home. She cannot recall     Usually admitted with hyperglycemia, uncontrolled hypertension and or missed dialysis and volume overload. She is being admitted for observation for hypoglycemia and soon as corrected can assess the regimen for home meds and decrease dosage and she can be discharged home     She has no other sx     See the two most recent h and p's done in less than 30 days for any details that are concerning and need further defintion.      Past Medical History     Past Medical History:   Diagnosis Date    Arthritis     Chronic kidney disease, stage 5, kidney failure (HCC)     Closed fracture of right shoulder girdle, with routine healing, subsequent encounter     DM (diabetes mellitus) type II controlled with renal manifestation ([facility] 06/1993    Gastroparesis     GERD (gastroesophageal reflux disease)     Hemodialysis patient ([facility]     dialysis on tues, thur, and sat at [facility]    Hypertension     Hypothyroid     Nasal congestion     recent    Neuropathy     Noncompliance with medications     Palliative care patient 10/08/2019    Restless leg syndrome         Past Surgical History     Past Surgical History:   Procedure Laterality Date    BREAST SURGERY Left 2008    infected milk gland removed    BREAST SURGERY Right 5/25/2021    WEDGE EXCISION RIGHT BREAST DUCT WITH LACRIMAL DUCT PROBE, PEC BLOCK performed by Hemalatha Ceron MD at [address], [facility]      2008    COLONOSCOPY Left 9/17/2020    Dr Jocelyn Anthony hepatic flexure-Severe tortuosity with severe spasming, 1 yr recall    DIALYSIS FISTULA CREATION Left 1/25/2019    REVISION LEFT UPPER EXTREMITY AV ACCESS WITH LEFT BRACHIAL ARTERY TO LEFT AXILLARY VEIN WITH  INTERPOSITIONAL ARTEGRAFT   performed by Jono Sandy MD at [address]  2012    [address] Right 1/25/2019    INSERTION OF RIGHT INTERNAL JUGULAR HEMODIALYSIS CATHETER performed by Jono Sandy MD at [address] [facility]  08/17/2018    1.  Moderately increased stainable iron identified by special stain in Kupffer cells and occasional hepatocytes. Negative for evidence of significant portal or lobular inflammation. Negative for evidence of significant fibrosis    ND COLONOSCOPY W/BIOPSY SINGLE/MULTIPLE N/A 10/20/2017    Dr Jorden Chamorro prep-Tubular AP (-) dysplasia x 2--3 yr recall    ND EGD TRANSORAL BIOPSY SINGLE/MULTIPLE N/A 12/27/2016    Dr Christoph Stanley EGD TRANSORAL BIOPSY SINGLE/MULTIPLE N/A 10/20/2017    Dr Lizett Arguelles (-)   [address] [facility] VASCULAR SURGERY Left 2016    fistula by Dr Sesar Kaye at [address]  10/02/2017    [facility]. Left upper fistulograms/venograms, balloon angioplasty cephalic vein arch [id number] conquest.    VASCULAR SURGERY  08/2018    FISTULOGRAM    VASCULAR SURGERY  10/29/2018    [facility]. Left upper fistulograms/venograms    VASCULAR SURGERY  12/19/2018    [facility]. Arch aortogram,left upper arteriograms.     VASCULAR SURGERY 03/06/2019    [facility]. Removal of tunneled dialyis catheter right internal jugular vein.  VASCULAR SURGERY  08/28/2019    [facility]. Left upper extremity fistulogram including venography to the superior vena cava. Balloon angioplasty left subclavian vein with 10mm x 40mm conquest balloon. Balloon angioplasty mid/proximal upper arm cephalic vein with 89WK x 40mm conquest balloon. Venograms after balloon angioplasty. Stent left mid/proximal upper arm cephalic vein stenosis fluency 10mm x 60mm self-expanding covered stent. Balloon     VASCULAR SURGERY  08/28/2019    cont angioplasty stent with 10mm x 40mm conquest balloon. Completion venograms left upper extremity. Medications Prior to Admission     Prior to Admission medications    Medication Sig Start Date End Date Taking? Authorizing Provider   lactulose (CHRONULAC) 10 GM/15ML solution Take 15 g by mouth 3 times daily With meals for constipation, hold for loose stools    Historical Provider, MD   cloNIDine (CATAPRES) 0.1 MG/24HR PTWK Place 1 patch onto the skin once a week 12/28/21   ROMINA Hunter CNP   nicotine (NICODERM CQ) 14 MG/24HR Place 1 patch onto the skin daily 12/23/21   ROMINA Hunter CNP   HYDROcodone-acetaminophen (NORCO) 5-325 MG per tablet Take 1 tablet by mouth every 6 hours as needed for Pain.  12/22/21 12/22/22  Trina Delgado MD   isosorbide mononitrate (IMDUR) 120 MG extended release tablet Take 1 tablet by mouth daily 11/26/21   ROMINA Hunter CNP   hydrALAZINE (APRESOLINE) 100 MG tablet Take 1 tablet by mouth every 8 hours 11/25/21   ROMINA Hunter CNP   metoprolol succinate (TOPROL XL) 100 MG extended release tablet Take 1 tablet by mouth every morning (before breakfast)  Patient taking differently: Take 150 mg by mouth every morning (before breakfast)  11/26/21   ROMINA Hunter CNP   NIFEdipine (PROCARDIA XL) 30 MG extended release tablet Take 3 tablets by mouth daily 11/26/21 Hayes Moreland, APRN - CNP   Ergocalciferol (VITAMIN D2) 10 MCG (400 UNIT) TABS Take 1.25 mg by mouth every 14 days    Historical Provider, MD   senna (SENNA-LAX) 8.6 MG tablet Take 1 tablet by mouth daily    Historical Provider, MD   sevelamer (RENVELA) 800 MG tablet Take 1 tablet by mouth 3 times daily (with meals)    Historical Provider, MD   lisinopril (PRINIVIL;ZESTRIL) 20 MG tablet Take 1 tablet by mouth 2 times daily 19   Aishwarya Sepulveda DO   albuterol sulfate  (90 Base) MCG/ACT inhaler Inhale 2 puffs into the lungs every 4 hours as needed    Historical Provider, MD   levothyroxine (SYNTHROID) 150 MCG tablet Take 150 mcg by mouth Daily    Historical Provider, MD   DULoxetine (CYMBALTA) 60 MG extended release capsule Take 60 mg by mouth daily    Historical Provider, MD   rOPINIRole (REQUIP) 2 MG tablet Take 4 mg by mouth nightly Indications: Restless Leg Syndrome     Historical Provider, MD   simvastatin (ZOCOR) 40 MG tablet Take 40 mg by mouth nightly     Historical Provider, MD        Allergies   Penicillins, Lamisil advanced [terbinafine], Reglan [metoclopramide], and Stadol [butorphanol]    Social History     Social History     Tobacco History     Smoking Status  Current Every Day Smoker Smoking Frequency  0.5 packs/day for 33 years (16.5 pk yrs) Smoking Tobacco Type  Cigarettes    Smokeless Tobacco Use  Never Used    Tobacco Comment  NOW at 1/4 PD, started at age 24 and has averaged 2 PPD          Alcohol History     Alcohol Use Status  No          Drug Use     Drug Use Status  Not Currently Types  Marijuana Rose Fung          Sexual Activity     Sexually Active  Not Currently Partners  Male                Family History     Family History   Problem Relation Age of Onset    Colon Cancer Mother          at 61    Colon Polyps Mother     Lung Cancer Father          at 66    Colon Polyps Father     Stomach Cancer Sister     Breast Cancer Sister 27    Depression Daughter 25 committed suicide    Liver Cancer Neg Hx     Liver Disease Neg Hx     Esophageal Cancer Neg Hx     Rectal Cancer Neg Hx        Review of Systems   Review of Systems     All 14 point review of systems are essentially negative and she states repeatedly to ER doc that she has been compliant. Physical Exam   BP (!) 122/47   Pulse 54   Temp 96.7 °F (35.9 °C) (Infrared)   Resp 12   Ht 5' 6\" (1.676 m)   Wt 174 lb 9.7 oz (79.2 kg)   SpO2 98%   BMI 28.18 kg/m²     Physical Exam  Vitals and nursing note reviewed. Constitutional:       Appearance: Normal appearance. HENT:      Head: Normocephalic and atraumatic. Nose: Nose normal.      Mouth/Throat:      Mouth: Mucous membranes are moist.   Eyes:      Conjunctiva/sclera: Conjunctivae normal.   Cardiovascular:      Rate and Rhythm: Normal rate and regular rhythm. Pulses: Normal pulses. Pulmonary:      Effort: Pulmonary effort is normal.   Abdominal:      General: Abdomen is flat. Bowel sounds are normal.      Palpations: Abdomen is soft. Musculoskeletal:         General: Normal range of motion. Skin:     General: Skin is warm. Neurological:      General: No focal deficit present. Mental Status: She is alert.    Psychiatric:         Mood and Affect: Mood normal.         Labs      Recent Results (from the past 24 hour(s))   POCT Glucose    Collection Time: 01/10/22  2:40 AM   Result Value Ref Range    POC Glucose 58 (L) 70 - 99 mg/dl    Performed on AccuChek    POCT GLUCOSE    Collection Time: 01/10/22  2:42 AM   Result Value Ref Range    Glucose 58 mg/dL   CBC Auto Differential    Collection Time: 01/10/22  2:48 AM   Result Value Ref Range    WBC 4.7 (L) 4.8 - 10.8 K/uL    RBC 2.83 (L) 4.20 - 5.40 M/uL    Hemoglobin 8.1 (L) 12.0 - 16.0 g/dL    Hematocrit 27.5 (L) 37.0 - 47.0 %    MCV 97.2 81.0 - 99.0 fL    MCH 28.6 27.0 - 31.0 pg    MCHC 29.5 (L) 33.0 - 37.0 g/dL    RDW 14.9 (H) 11.5 - 14.5 %    Platelets 851 228 - 506 K/uL    MPV 9.2 (L) 9.4 - 12.3 fL    Neutrophils % 45.5 (L) 50.0 - 65.0 %    Lymphocytes % 33.1 20.0 - 40.0 %    Monocytes % 12.7 (H) 0.0 - 10.0 %    Eosinophils % 6.8 (H) 0.0 - 5.0 %    Basophils % 1.7 (H) 0.0 - 1.0 %    Neutrophils Absolute 2.1 1.5 - 7.5 K/uL    Immature Granulocytes # 0.0 K/uL    Lymphocytes Absolute 1.6 1.1 - 4.5 K/uL    Monocytes Absolute 0.60 0.00 - 0.90 K/uL    Eosinophils Absolute 0.30 0.00 - 0.60 K/uL    Basophils Absolute 0.10 0.00 - 0.20 K/uL   Comprehensive Metabolic Panel w/ Reflex to MG    Collection Time: 01/10/22  2:48 AM   Result Value Ref Range    Sodium 138 136 - 145 mmol/L    Potassium reflex Magnesium 3.0 (L) 3.5 - 5.0 mmol/L    Chloride 95 (L) 98 - 111 mmol/L    CO2 24 22 - 29 mmol/L    Anion Gap 19 7 - 19 mmol/L    Glucose 67 (L) 74 - 109 mg/dL    BUN 35 (H) 6 - 20 mg/dL    CREATININE 4.0 (H) 0.5 - 0.9 mg/dL    GFR Non- 12 (A) >60    GFR  14 (L) >59    Calcium 8.9 8.6 - 10.0 mg/dL    Total Protein 6.9 6.6 - 8.7 g/dL    Albumin 3.9 3.5 - 5.2 g/dL    Total Bilirubin 0.3 0.2 - 1.2 mg/dL    Alkaline Phosphatase 98 35 - 104 U/L    ALT 14 5 - 33 U/L    AST 23 5 - 32 U/L   Magnesium    Collection Time: 01/10/22  2:48 AM   Result Value Ref Range    Magnesium 2.4 1.6 - 2.6 mg/dL   COVID-19, Rapid    Collection Time: 01/10/22  2:50 AM    Specimen: Nasopharyngeal Swab   Result Value Ref Range    SARS-CoV-2, NAAT Not Detected Not Detected   POCT Glucose    Collection Time: 01/10/22  4:15 AM   Result Value Ref Range    POC Glucose 49 (LL) 70 - 99 mg/dl    Performed on AccuChek    POCT Glucose    Collection Time: 01/10/22  5:03 AM   Result Value Ref Range    POC Glucose 116 (H) 70 - 99 mg/dl    Performed on AccuChek    POCT GLUCOSE    Collection Time: 01/10/22  5:05 AM   Result Value Ref Range    Glucose 116 mg/dL        Imaging/Diagnostics Last 24 Hours   No results found.     Assessment      Hospital Problems           Last Modified POA    Hypoglycemia associated with diabetes ([facility] 1/10/2022 Yes          Plan   1. Admit for observation   2. Repeated admissions each month due to severe noncompliance     3. Hypoglycemia   Unclear etiology   Either took to much of her meds or took a repeat dosage   Give d 10 drip and monitor blood sugars and once stabilized she can be discharged home after going over home regimen of meds     Holding insulin for now     4.  esrd and on hd   Consult nephrology     5. Diet regular diet     6. Resume meds.    Consultations Ordered:  IP CONSULT TO NEPHROLOGY    Electronically signed by Jean Toribio MD on 1/10/22 at 5:40 AM CST

## 2022-01-10 NOTE — PLAN OF CARE
Problem: Falls - Risk of:  Goal: Will remain free from falls  Description: Will remain free from falls  1/10/2022 1727 by Jones Mcardle, RN  Outcome: Ongoing  1/10/2022 0659 by Serene Saba RN  Outcome: Ongoing  Goal: Absence of physical injury  Description: Absence of physical injury  1/10/2022 1727 by Jones Mcardle, RN  Outcome: Ongoing  1/10/2022 0659 by Serene Saba RN  Outcome: Ongoing

## 2022-01-10 NOTE — CONSULTS
Nephrology ([facility]) Consult Note      Patient:  Wojciech Martinez  YOB: 1970  Date of Service: 1/10/2022  MRN: [id number]   Acct: [de-identified]   Primary Care Physician: ROMINA Roman  Advance Directive: Full Code  Admit Date: 1/10/2022       Hospital Day: 0  Referring Provider: Kaya Null MD    Patient independently seen and examined, Chart, Consults, Notes, Operative notes, Labs, Cardiology, and Radiology studies reviewed as available. Chief complaint: Weakness/hypoglycemia/end-stage renal disease. Subjective:  Wojciech Martinez is a 45 y.o. female for whom we were consulted for evaluation and treatment of end-stage renal disease. Patient has end-stage renal disease due to diabetic nephropathy. Patient is fairly noncompliant dialysis patient and missed treatment. Presented with profound weakness, lack of energy and disorientation. She was found to have blood sugar less than 50 mg. He was treated with intravenous D10. She has type 2 diabetes insulin-dependent. He is currently staying home, no longer at nursing home and has been discharged from nursing home. Patient denies any chest pain or shortness of breath she denies any nausea vomiting or diarrhea.     This morning she is sitting up and eating breakfast and feels well    Allergies:  Penicillins, Lamisil advanced [terbinafine], Reglan [metoclopramide], and Stadol [butorphanol]    Medicines:  Current Facility-Administered Medications   Medication Dose Route Frequency Provider Last Rate Last Admin    dextrose 10 % infusion   IntraVENous Continuous Warden Dustin  mL/hr at 01/10/22 0753 Rate Change at 01/10/22 0753    cloNIDine (CATAPRES) 0.1 MG/24HR 1 patch  1 patch TransDERmal Weekly Warden Dustin MD        DULoxetine (CYMBALTA) extended release capsule 60 mg  60 mg Oral Daily Warden Dustin MD        vitamin D (ERGOCALCIFEROL) capsule 50,000 Units  50,000 Units Oral U98 Days Conception MD Viki        hydrALAZINE (APRESOLINE) tablet 100 mg  100 mg Oral 3 times per day Conception MD Viki   100 mg at 01/10/22 0752    HYDROcodone-acetaminophen (NORCO) 5-325 MG per tablet 1 tablet  1 tablet Oral Q6H PRN Conception MD Viki        isosorbide mononitrate (IMDUR) extended release tablet 120 mg  120 mg Oral Daily Conception MD Viki        lactulose (CHRONULAC) 10 GM/15ML solution 15.3333 g  15.3333 g Oral TID Conception MD Viki        levothyroxine (SYNTHROID) tablet 150 mcg  150 mcg Oral Daily Conception MD Viki   150 mcg at 01/10/22 0752    lisinopril (PRINIVIL;ZESTRIL) tablet 20 mg  20 mg Oral BID Conception MD Viki        metoprolol succinate (TOPROL XL) extended release tablet 150 mg  150 mg Oral QAM AC Conception MD Viki        nicotine (NICODERM CQ) 14 MG/24HR 1 patch  1 patch TransDERmal Daily Conception MD Viki        NIFEdipine (PROCARDIA XL) extended release tablet 90 mg  90 mg Oral Daily Conception MD Viki        rOPINIRole (REQUIP) tablet 4 mg  4 mg Oral Nightly Conception MD Viki        senna (SENOKOT) tablet 8.6 mg  1 tablet Oral Daily Conception MD Viki        sevelamer (RENVELA) tablet 800 mg  800 mg Oral TID WC Topher Drew MD   800 mg at 01/10/22 6330    atorvastatin (LIPITOR) tablet 20 mg  20 mg Oral Daily Conception MD Viki        sodium chloride flush 0.9 % injection 5-40 mL  5-40 mL IntraVENous 2 times per day Topher Drew MD        sodium chloride flush 0.9 % injection 5-40 mL  5-40 mL IntraVENous PRN Conception MD Viki        0.9 % sodium chloride infusion  25 mL IntraVENous PRN Conception MD Viki        enoxaparin (LOVENOX) injection 30 mg  30 mg SubCUTAneous Daily Conception MD Viki        ondansetron (ZOFRAN-ODT) disintegrating tablet 4 mg  4 mg Oral Q8H PRN Conception MD Juan R Drew ondansetron (ZOFRAN) injection 4 mg  4 mg IntraVENous Q6H PRN Eva Boone MD        polyethylene glycol (GLYCOLAX) packet 17 g  17 g Oral Daily PRN Eva Boone MD        acetaminophen (TYLENOL) tablet 650 mg  650 mg Oral Q6H PRN Eva Boone MD        Or    acetaminophen (TYLENOL) suppository 650 mg  650 mg Rectal Q6H PRN Eva Boone MD        albuterol (PROVENTIL) nebulizer solution 2.5 mg  2.5 mg Nebulization Q4H PRN Eva Boone MD           Past Medical History:  Past Medical History:   Diagnosis Date    Arthritis     Chronic kidney disease, stage 5, kidney failure ([address]     Closed fracture of right shoulder girdle, with routine healing, subsequent encounter     DM (diabetes mellitus) type II controlled with renal manifestation ([facility] 06/1993    Gastroparesis     GERD (gastroesophageal reflux disease)     Hemodialysis patient ([facility]     dialysis on tues, thur, and sat at [facility]    Hypertension     Hypothyroid     Nasal congestion     recent    Neuropathy     Noncompliance with medications     Palliative care patient 10/08/2019    Restless leg syndrome        Past Surgical History:  Past Surgical History:   Procedure Laterality Date    BREAST SURGERY Left 2008    infected milk gland removed    BREAST SURGERY Right 5/25/2021    WEDGE EXCISION RIGHT BREAST DUCT WITH LACRIMAL DUCT PROBE, PEC BLOCK performed by Patsy Yo MD at [address] [place] [address], LAPAROSCOPIC      2008    COLONOSCOPY Left 9/17/2020    Dr Benton Avila hepatic flexure-Severe tortuosity with severe spasming, 1 yr recall    DIALYSIS FISTULA CREATION Left 1/25/2019    REVISION LEFT UPPER EXTREMITY AV ACCESS WITH LEFT BRACHIAL ARTERY TO LEFT AXILLARY VEIN WITH  INTERPOSITIONAL ARTEGRAFT   performed by Haritha Browning MD at [address]  2012    [address] Right 1/25/2019    INSERTION OF RIGHT INTERNAL JUGULAR HEMODIALYSIS CATHETER performed by Jordan Crawford MD at [facility]  2018    1.  Moderately increased stainable iron identified by special stain in Kupffer cells and occasional hepatocytes. Negative for evidence of significant portal or lobular inflammation. Negative for evidence of significant fibrosis    DE COLONOSCOPY W/BIOPSY SINGLE/MULTIPLE N/A 10/20/2017    Dr Tej Lane prep-Tubular AP (-) dysplasia x 2--3 yr recall    DE EGD TRANSORAL BIOPSY SINGLE/MULTIPLE N/A 2016    Dr Olga Rhodes EGD TRANSORAL BIOPSY SINGLE/MULTIPLE N/A 10/20/2017    Dr Bob Liao (-)   [address] [facility] VASCULAR SURGERY Left 2016    fistula by Dr Yuli Hernandez at [address]  10/02/2017    [facility]. Left upper fistulograms/venograms, balloon angioplasty cephalic vein arch 11T50 conquest.    VASCULAR SURGERY  2018    FISTULOGRAM    VASCULAR SURGERY  10/29/2018    [facility]. Left upper fistulograms/venograms    VASCULAR SURGERY  2018    [facility]. Arch aortogram,left upper arteriograms.  VASCULAR SURGERY  2019    [facility]. Removal of tunneled dialyis catheter right internal jugular vein.  VASCULAR SURGERY  2019    [facility]. Left upper extremity fistulogram including venography to the superior vena cava. Balloon angioplasty left subclavian vein with 10mm x 40mm conquest balloon. Balloon angioplasty mid/proximal upper arm cephalic vein with 55BE x 40mm conquest balloon. Venograms after balloon angioplasty. Stent left mid/proximal upper arm cephalic vein stenosis fluency 10mm x 60mm self-expanding covered stent. Balloon     VASCULAR SURGERY  2019    cont angioplasty stent with 10mm x 40mm conquest balloon. Completion venograms left upper extremity.        Family History  Family History   Problem Relation Age of Onset    Colon Cancer Mother          at 63    Colon Polyps Mother     Lung Cancer Father          at 66    Colon Polyps Father     Stomach Cancer Sister     Breast Cancer Sister 27    Depression Daughter 25        committed suicide    Liver Cancer Neg Hx     Liver Disease Neg Hx     Esophageal Cancer Neg Hx     Rectal Cancer Neg Hx        Social History  Social History     Socioeconomic History    Marital status: Single     Spouse name: Not on file    Number of children: 2    Years of education: Not on file    Highest education level: Not on file   Occupational History    Not on file   Tobacco Use    Smoking status: Current Every Day Smoker     Packs/day: 0.50     Years: 33.00     Pack years: 16.50     Types: Cigarettes    Smokeless tobacco: Never Used    Tobacco comment: NOW at 1/4 PD, started at age 24 and has averaged 2 PPD   Vaping Use    Vaping Use: Never used   Substance and Sexual Activity    Alcohol use: No    Drug use: Not Currently     Types: Marijuana Yong Free)    Sexual activity: Not Currently     Partners: Male   Other Topics Concern    Not on file   Social History Narrative    Not on file     Social Determinants of Health     Financial Resource Strain:     Difficulty of Paying Living Expenses: Not on file   Food Insecurity:     Worried About Running Out of Food in the Last Year: Not on file    Kendy of Food in the Last Year: Not on file   Transportation Needs:     Lack of Transportation (Medical): Not on file    Lack of Transportation (Non-Medical):  Not on file   Physical Activity:     Days of Exercise per Week: Not on file    Minutes of Exercise per Session: Not on file   Stress:     Feeling of Stress : Not on file   Social Connections:     Frequency of Communication with Friends and Family: Not on file    Frequency of Social Gatherings with Friends and Family: Not on file    Attends Holiness Services: Not on file    Active Member of Clubs or Organizations: Not on file    Attends Club or Organization Meetings: Not on file    Marital Status: Not on file   Intimate Partner Violence:     Fear of Current or Ex-Partner: Not on file   Cristy Martin Emotionally Abused: Not on file    Physically Abused: Not on file    Sexually Abused: Not on file   Housing Stability:     Unable to Pay for Housing in the Last Year: Not on file    Number of Places Lived in the Last Year: Not on file    Unstable Housing in the Last Year: Not on file         Review of Systems:  History obtained from chart review and the patient  General ROS: No fever or chills  Respiratory ROS: No cough, shortness of breath, wheezing  Cardiovascular ROS: No chest pain or palpitations  Gastrointestinal ROS: No abdominal pain or melena  Genito-Urinary ROS: No dysuria or hematuria  Musculoskeletal ROS: No joint pain or swelling   14 point ROS reviewed with the patient and negative except as noted above and in the HPI unless unable to obtain.     Objective:  Patient Vitals for the past 24 hrs:   BP Temp Temp src Pulse Resp SpO2 Height Weight   01/10/22 0931 (!) 178/90 97.3 °F (36.3 °C)  63  98 %     01/10/22 0652 (!) 171/65 96.1 °F (35.6 °C) Temporal 59 18 100 % 5' 6\" (1.676 m) 155 lb (70.3 kg)   01/10/22 0606 (!) 142/63          01/10/22 0527 (!) 122/47   54 12 98 %     01/10/22 0425 131/73   57 9 99 %     01/10/22 0242 (!) 149/67 96.7 °F (35.9 °C) Infrared 62 11 95 % 5' 6\" (1.676 m) 174 lb 9.7 oz (79.2 kg)       Intake/Output Summary (Last 24 hours) at 1/10/2022 0953  Last data filed at 1/10/2022 0910  Gross per 24 hour   Intake 360 ml   Output    Net 360 ml     General: awake/alert   HEENT: Normocephalic atraumatic head  Neck: Supple with no JVD or carotid bruits with  Chest:  clear to auscultation bilaterally  CVS: regular rate and rhythm  Abdominal: soft, nontender, normal bowel sounds  Extremities: no cyanosis or edema  Skin: warm and dry without rash      Labs:  BMP:   Recent Labs     01/10/22  0248      K 3.0*   CL 95*   CO2 24   BUN 35*   CREATININE 4.0*   CALCIUM 8.9     CBC:   Recent Labs     01/10/22  0248   WBC 4.7*   HGB 8.1*   HCT 27.5*   MCV 97.2    LIVER PROFILE:   Recent Labs     01/10/22  0248   AST 23   ALT 14   BILITOT 0.3   ALKPHOS 98     PT/INR: No results for input(s): PROTIME, INR in the last 72 hours. APTT: No results for input(s): APTT in the last 72 hours. BNP:  No results for input(s): BNP in the last 72 hours. Ionized Calcium:No results for input(s): IONCA in the last 72 hours. Magnesium:  Recent Labs     01/10/22  0248   MG 2.4     Phosphorus:No results for input(s): PHOS in the last 72 hours. HgbA1C: No results for input(s): LABA1C in the last 72 hours. Hepatic:   Recent Labs     01/10/22  0248   ALKPHOS 98   ALT 14   AST 23   PROT 6.9   BILITOT 0.3   LABALBU 3.9     Lactic Acid: No results for input(s): LACTA in the last 72 hours. Troponin: No results for input(s): CKTOTAL, CKMB, TROPONINT in the last 72 hours. ABGs: No results for input(s): PH, PCO2, PO2, HCO3, O2SAT in the last 72 hours. CRP:  No results for input(s): CRP in the last 72 hours. Sed Rate:  No results for input(s): SEDRATE in the last 72 hours. Cultures:   No results for input(s): CULTURE in the last 72 hours. No results for input(s): BC, Malcolm Cambric in the last 72 hours. No results for input(s): CXSURG in the last 72 hours. Radiology reports as per the Radiologist  Radiology: XR CHEST PORTABLE    Result Date: 1/10/2022  Examination. XR CHEST PORTABLE 1/10/2022 5:37 AM History: Altered mental status. Hypoglycemia. A single frontal portable semiupright view of the chest is compared with the previous study dated 1/1/2022. The lungs are moderately expanded. There is no evidence of recent infiltrate, pleural effusion, pulmonary congestion or pneumothorax. There is moderate cardiomegaly. No acute bony abnormality. A left-sided subclavian vascular graft is seen in place. 1. No active cardiopulmonary disease. 2. A moderate cardiomegaly. Signed by Dr Zena Pikcett   1. End-stage renal disease on maintenance dialysis.   2.  Type II diabetic nephropathy. 3.  Severe hypoglycemia. 4.  Anemia of chronic kidney disease. 5.  Poor medical compliance. 6.  Secondary hyperparathyroidism. Plan:  1. Conventional hemodialysis treatment tomorrow. 2.  Resume [facility]. Thank you for the consult, we appreciate the opportunity to provide care to your patients. Feel free to contact me if I can be of any further assistance.       Renaldo Espinoza MD  01/10/22  9:53 AM

## 2022-01-10 NOTE — ED NOTES
Bed: 06  Expected date:   Expected time:   Means of arrival:   Comments:  EMS     Vilma Bosch RN  01/10/22 8753

## 2022-01-10 NOTE — SIGNIFICANT EVENT
Update Note:  Improving hypyglycemia, can now DC D10 is hyperglycemic. Continue to monitor, encourage PO intake. Encourage improved compliance.     Jon Rose MD  1/10/22

## 2022-01-10 NOTE — CARE COORDINATION
Patient is current with [facility] for [address]. Will follow. Please advise when patient discharges. WILL NEED RESUMPTION OF CARE ORDERS TO RESUME HH SERVICES WHEN PATIENT DISCHARGES. Thank you. [address] [place] [address] [phone]. [phone].     Jesus Thornton RN, Home Care Liaison  [phone] P  Electronically signed by Jesus Thornton on 1/10/2022 at 8:59 AM

## 2022-01-11 LAB
ALBUMIN SERPL-MCNC: 3.6 G/DL (ref 3.5–5.2)
ALP BLD-CCNC: 99 U/L (ref 35–104)
ALT SERPL-CCNC: 12 U/L (ref 5–33)
ANION GAP SERPL CALCULATED.3IONS-SCNC: 15 MMOL/L (ref 7–19)
AST SERPL-CCNC: 18 U/L (ref 5–32)
BILIRUB SERPL-MCNC: 0.3 MG/DL (ref 0.2–1.2)
BUN BLDV-MCNC: 44 MG/DL (ref 6–20)
C DIFF TOXIN/ANTIGEN: NORMAL
C. DIFFICILE TOXIN MOLECULAR: NORMAL
CALCIUM SERPL-MCNC: 8.3 MG/DL (ref 8.6–10)
CHLORIDE BLD-SCNC: 92 MMOL/L (ref 98–111)
CO2: 25 MMOL/L (ref 22–29)
CREAT SERPL-MCNC: 5.3 MG/DL (ref 0.5–0.9)
GFR AFRICAN AMERICAN: 10
GFR NON-AFRICAN AMERICAN: 8
GLUCOSE BLD-MCNC: 201 MG/DL (ref 70–99)
GLUCOSE BLD-MCNC: 216 MG/DL (ref 70–99)
GLUCOSE BLD-MCNC: 216 MG/DL (ref 74–109)
GLUCOSE BLD-MCNC: 227 MG/DL (ref 70–99)
GLUCOSE BLD-MCNC: 255 MG/DL (ref 70–99)
HAV IGM SER IA-ACNC: NORMAL
HBA1C MFR BLD: 12.4 % (ref 4–6)
HCT VFR BLD CALC: 25.9 % (ref 37–47)
HEMOGLOBIN: 7.9 G/DL (ref 12–16)
HEPATITIS B CORE IGM ANTIBODY: NORMAL
HEPATITIS B SURFACE ANTIGEN INTERPRETATION: NORMAL
HEPATITIS C ANTIBODY INTERPRETATION: NORMAL
MCH RBC QN AUTO: 28.7 PG (ref 27–31)
MCHC RBC AUTO-ENTMCNC: 30.5 G/DL (ref 33–37)
MCV RBC AUTO: 94.2 FL (ref 81–99)
PDW BLD-RTO: 14.9 % (ref 11.5–14.5)
PERFORMED ON: ABNORMAL
PLATELET # BLD: 139 K/UL (ref 130–400)
PMV BLD AUTO: 9.8 FL (ref 9.4–12.3)
POTASSIUM REFLEX MAGNESIUM: 3.6 MMOL/L (ref 3.5–5)
RBC # BLD: 2.75 M/UL (ref 4.2–5.4)
SODIUM BLD-SCNC: 132 MMOL/L (ref 136–145)
TOTAL PROTEIN: 5.8 G/DL (ref 6.6–8.7)
WBC # BLD: 4.9 K/UL (ref 4.8–10.8)

## 2022-01-11 PROCEDURE — 80053 COMPREHEN METABOLIC PANEL: CPT

## 2022-01-11 PROCEDURE — 82947 ASSAY GLUCOSE BLOOD QUANT: CPT

## 2022-01-11 PROCEDURE — 87449 NOS EACH ORGANISM AG IA: CPT

## 2022-01-11 PROCEDURE — 6370000000 HC RX 637 (ALT 250 FOR IP): Performed by: INTERNAL MEDICINE

## 2022-01-11 PROCEDURE — 80074 ACUTE HEPATITIS PANEL: CPT

## 2022-01-11 PROCEDURE — 96367 TX/PROPH/DG ADDL SEQ IV INF: CPT

## 2022-01-11 PROCEDURE — G0378 HOSPITAL OBSERVATION PER HR: HCPCS

## 2022-01-11 PROCEDURE — 6360000002 HC RX W HCPCS: Performed by: INTERNAL MEDICINE

## 2022-01-11 PROCEDURE — 87324 CLOSTRIDIUM AG IA: CPT

## 2022-01-11 PROCEDURE — 8010000000 HC HEMODIALYSIS ACUTE INPT

## 2022-01-11 PROCEDURE — 2580000003 HC RX 258: Performed by: INTERNAL MEDICINE

## 2022-01-11 PROCEDURE — 36415 COLL VENOUS BLD VENIPUNCTURE: CPT

## 2022-01-11 PROCEDURE — 85027 COMPLETE CBC AUTOMATED: CPT

## 2022-01-11 PROCEDURE — 87493 C DIFF AMPLIFIED PROBE: CPT

## 2022-01-11 PROCEDURE — 83036 HEMOGLOBIN GLYCOSYLATED A1C: CPT

## 2022-01-11 PROCEDURE — 96372 THER/PROPH/DIAG INJ SC/IM: CPT

## 2022-01-11 RX ORDER — DEXTROSE MONOHYDRATE 25 G/50ML
12.5 INJECTION, SOLUTION INTRAVENOUS PRN
Status: DISCONTINUED | OUTPATIENT
Start: 2022-01-11 | End: 2022-01-12 | Stop reason: HOSPADM

## 2022-01-11 RX ORDER — DEXTROSE MONOHYDRATE 50 MG/ML
100 INJECTION, SOLUTION INTRAVENOUS PRN
Status: DISCONTINUED | OUTPATIENT
Start: 2022-01-11 | End: 2022-01-12 | Stop reason: HOSPADM

## 2022-01-11 RX ORDER — LEVOFLOXACIN 5 MG/ML
500 INJECTION, SOLUTION INTRAVENOUS
Status: DISCONTINUED | OUTPATIENT
Start: 2022-01-11 | End: 2022-01-12 | Stop reason: HOSPADM

## 2022-01-11 RX ORDER — NICOTINE POLACRILEX 4 MG
15 LOZENGE BUCCAL PRN
Status: DISCONTINUED | OUTPATIENT
Start: 2022-01-11 | End: 2022-01-12 | Stop reason: HOSPADM

## 2022-01-11 RX ADMIN — METOPROLOL SUCCINATE 150 MG: 50 TABLET, EXTENDED RELEASE ORAL at 05:50

## 2022-01-11 RX ADMIN — DULOXETINE 60 MG: 60 CAPSULE, DELAYED RELEASE ORAL at 12:14

## 2022-01-11 RX ADMIN — SODIUM CHLORIDE 25 ML: 9 INJECTION, SOLUTION INTRAVENOUS at 16:23

## 2022-01-11 RX ADMIN — INSULIN LISPRO 2 UNITS: 100 INJECTION, SOLUTION INTRAVENOUS; SUBCUTANEOUS at 13:01

## 2022-01-11 RX ADMIN — LACTULOSE 15.33 G: 20 SOLUTION ORAL at 21:57

## 2022-01-11 RX ADMIN — LISINOPRIL 20 MG: 20 TABLET ORAL at 12:13

## 2022-01-11 RX ADMIN — SODIUM CHLORIDE, PRESERVATIVE FREE 10 ML: 5 INJECTION INTRAVENOUS at 21:58

## 2022-01-11 RX ADMIN — NIFEDIPINE 90 MG: 90 TABLET, FILM COATED, EXTENDED RELEASE ORAL at 12:14

## 2022-01-11 RX ADMIN — ATORVASTATIN CALCIUM 20 MG: 20 TABLET, FILM COATED ORAL at 12:14

## 2022-01-11 RX ADMIN — SEVELAMER CARBONATE 800 MG: 800 TABLET, FILM COATED ORAL at 17:29

## 2022-01-11 RX ADMIN — HYDRALAZINE HYDROCHLORIDE 100 MG: 50 TABLET, FILM COATED ORAL at 17:29

## 2022-01-11 RX ADMIN — ENOXAPARIN SODIUM 30 MG: 100 INJECTION SUBCUTANEOUS at 12:14

## 2022-01-11 RX ADMIN — ROPINIROLE HYDROCHLORIDE 4 MG: 4 TABLET, FILM COATED ORAL at 21:58

## 2022-01-11 RX ADMIN — HYDRALAZINE HYDROCHLORIDE 100 MG: 50 TABLET, FILM COATED ORAL at 12:14

## 2022-01-11 RX ADMIN — SEVELAMER CARBONATE 800 MG: 800 TABLET, FILM COATED ORAL at 12:13

## 2022-01-11 RX ADMIN — LEVOTHYROXINE SODIUM 150 MCG: 75 TABLET ORAL at 05:50

## 2022-01-11 RX ADMIN — ISOSORBIDE MONONITRATE 120 MG: 60 TABLET, EXTENDED RELEASE ORAL at 12:14

## 2022-01-11 RX ADMIN — INSULIN LISPRO 2 UNITS: 100 INJECTION, SOLUTION INTRAVENOUS; SUBCUTANEOUS at 17:32

## 2022-01-11 RX ADMIN — LISINOPRIL 20 MG: 20 TABLET ORAL at 21:58

## 2022-01-11 RX ADMIN — SENNOSIDES 8.6 MG: 8.6 TABLET, COATED ORAL at 12:13

## 2022-01-11 RX ADMIN — INSULIN LISPRO 1 UNITS: 100 INJECTION, SOLUTION INTRAVENOUS; SUBCUTANEOUS at 21:58

## 2022-01-11 RX ADMIN — LEVOFLOXACIN 500 MG: 5 INJECTION, SOLUTION INTRAVENOUS at 16:27

## 2022-01-11 RX ADMIN — HYDRALAZINE HYDROCHLORIDE 100 MG: 50 TABLET, FILM COATED ORAL at 00:47

## 2022-01-11 RX ADMIN — SODIUM CHLORIDE, PRESERVATIVE FREE 10 ML: 5 INJECTION INTRAVENOUS at 12:14

## 2022-01-11 ASSESSMENT — PAIN SCALES - WONG BAKER: WONGBAKER_NUMERICALRESPONSE: 0

## 2022-01-11 ASSESSMENT — PAIN SCALES - GENERAL: PAINLEVEL_OUTOF10: 0

## 2022-01-11 NOTE — PROGRESS NOTES
Length of Stay:   LOS: 0 days      Chief complaint:  Chief Complaint   Patient presents with    Hypoglycemia     Patient bs on EMS arrival was 23. Patient given 150 of D10 by EMS. Last EMS bs was 101. Subjective:  Resting in dialysis, feeling better    Physical Examination:  BP (!) 162/64   Pulse 81   Temp 98.9 °F (37.2 °C) (Temporal)   Resp 16   Ht 5' 6\" (1.676 m)   Wt 155 lb (70.3 kg)   SpO2 97%   BMI 25.02 kg/m²   Constitutional  Appropriately groomed, good nutritional status  Psychiatric - AOX3, baseline mood  Eyes - EOMI, conjunctivae and lids intact  ENMT - lips, teeth, gums intact; hearing intact  Respiratory  CTAB, no accessory muscle use  Cardiovascular  Clear S1, S2 no gross m/r/g; pedal pulses intact  Abd  Soft, non-tender;  No gross hernia  MSK - UE and LE joints intact, no gross clubbing  Skin  No rashes or wounds; no gross capillary refill defects  Neurologic  CN 2-12 grossly intact, sensation intact    Medications:  insulin lispro, 0-6 Units, SubCUTAneous, TID WC    insulin lispro, 0-3 Units, SubCUTAneous, Nightly    cloNIDine, 1 patch, TransDERmal, Weekly    DULoxetine, 60 mg, Oral, Daily    vitamin D, 50,000 Units, Oral, Q14 Days    hydrALAZINE, 100 mg, Oral, 3 times per day    isosorbide mononitrate, 120 mg, Oral, Daily    lactulose, 15.3333 g, Oral, TID    levothyroxine, 150 mcg, Oral, Daily    lisinopril, 20 mg, Oral, BID    metoprolol succinate, 150 mg, Oral, QAM AC    nicotine, 1 patch, TransDERmal, Daily    NIFEdipine, 90 mg, Oral, Daily    rOPINIRole, 4 mg, Oral, Nightly    senna, 1 tablet, Oral, Daily    sevelamer, 800 mg, Oral, TID WC    atorvastatin, 20 mg, Oral, Daily    sodium chloride flush, 5-40 mL, IntraVENous, 2 times per day    enoxaparin, 30 mg, SubCUTAneous, Daily    epoetin tish-epbx, 10,000 Units, SubCUTAneous, Once per day on Mon Wed Fri    insulin lispro, 0-6 Units, SubCUTAneous, TID WC    insulin lispro, 0-3 Units, SubCUTAneous, Nightly      Problem list:  Active Problems:    Hypoglycemia associated with diabetes ([facility]  Resolved Problems:    * No resolved hospital problems.  *        A/P:  []DM2 poorly controlled, hypoglycemic episode  Now mildly hyperglycemic, continue to monitor, DC dextrose  -SSI as needed  Will need f/u with PCP to continue to monitor sugar    []ESRD on dialysis  Renal following  Tolerated dialysis well today        Dada Avalos M.D.,  1/11/2022

## 2022-01-11 NOTE — PROGRESS NOTES
Nephrology ([facility]) Progress Note      Patient:  Shey Evans  YOB: 1970  Date of Service: 1/11/2022  MRN: [id number]   Acct: [de-identified]   Primary Care Physician: ROMINA White  Advance Directive: Full Code  Admit Date: 1/10/2022       Hospital Day: 0  Referring Provider: Angela Hobbs MD    Patient independently seen and examined, Chart, Consults, Notes, Operative notes, Labs, Cardiology, and Radiology studies reviewed as available. Chief complaint: Weakness/hypoglycemia/end-stage renal disease. Subjective:  Shey Evans is a 45 y.o. female for whom we were consulted for evaluation and treatment of end-stage renal disease. Patient has end-stage renal disease due to diabetic nephropathy. Patient is fairly noncompliant dialysis patient and missed treatment. Presented with profound weakness, lack of energy and disorientation. She was found to have blood sugar less than 50 mg. He was treated with intravenous D10. She has type 2 diabetes insulin-dependent. He is currently staying home, no longer at nursing home and has been discharged from nursing home. Patient denies any chest pain or shortness of breath she denies any nausea vomiting or diarrhea. Currently seen on hemodialysis  Hemodialysis access: AV fistula  Hemodialysis: 3-1/2 hours. Ultrafiltration: 3000 cc  2K bath  Blood flow rate is 450 cc/min.     Allergies:  Penicillins, Lamisil advanced [terbinafine], Reglan [metoclopramide], and Stadol [butorphanol]    Medicines:  Current Facility-Administered Medications   Medication Dose Route Frequency Provider Last Rate Last Admin    cloNIDine (CATAPRES) 0.1 MG/24HR 1 patch  1 patch TransDERmal Weekly Jeniffer Locke MD   1 patch at 01/10/22 0955    DULoxetine (CYMBALTA) extended release capsule 60 mg  60 mg Oral Daily Jeniffer Locke MD   60 mg at 01/10/22 0955    vitamin D (ERGOCALCIFEROL) capsule 50,000 Units  50,000 Units Oral Q14 Days Mathew Aguiar MD   50,000 Units at 01/10/22 0955    hydrALAZINE (APRESOLINE) tablet 100 mg  100 mg Oral 3 times per day Mathew Aguiar MD   100 mg at 01/11/22 0047    HYDROcodone-acetaminophen (NORCO) 5-325 MG per tablet 1 tablet  1 tablet Oral Q6H PRN Mathew Aguiar MD        isosorbide mononitrate (IMDUR) extended release tablet 120 mg  120 mg Oral Daily Mathew Aguiar MD   120 mg at 01/10/22 0954    lactulose (CHRONULAC) 10 GM/15ML solution 15.3333 g  15.3333 g Oral TID Mathew Aguiar MD   15.3333 g at 01/10/22 2232    levothyroxine (SYNTHROID) tablet 150 mcg  150 mcg Oral Daily Mathew Aguiar MD   150 mcg at 01/11/22 0550    lisinopril (PRINIVIL;ZESTRIL) tablet 20 mg  20 mg Oral BID Mathew Aguiar MD   20 mg at 01/10/22 2232    metoprolol succinate (TOPROL XL) extended release tablet 150 mg  150 mg Oral QAM AC Mathew Aguiar MD   150 mg at 01/11/22 0550    nicotine (NICODERM CQ) 14 MG/24HR 1 patch  1 patch TransDERmal Daily Mathew Aguiar MD   1 patch at 01/10/22 0955    NIFEdipine (PROCARDIA XL) extended release tablet 90 mg  90 mg Oral Daily Mathew Aguiar MD   90 mg at 01/10/22 1153    rOPINIRole (REQUIP) tablet 4 mg  4 mg Oral Nightly Mathew Aguiar MD   4 mg at 01/10/22 2232    senna (SENOKOT) tablet 8.6 mg  1 tablet Oral Daily Mathew Aguiar MD   8.6 mg at 01/10/22 0955    sevelamer (RENVELA) tablet 800 mg  800 mg Oral TID WC Mathew Aguiar MD   800 mg at 01/10/22 1844    atorvastatin (LIPITOR) tablet 20 mg  20 mg Oral Daily Mathew Aguiar MD   20 mg at 01/10/22 0954    sodium chloride flush 0.9 % injection 5-40 mL  5-40 mL IntraVENous 2 times per day Mathew Aguiar MD   10 mL at 01/10/22 1000    sodium chloride flush 0.9 % injection 5-40 mL  5-40 mL IntraVENous DESMOND Aguiar MD        0.9 % sodium chloride infusion  25 mL IntraVENous PRN Anjana Garcia MD        enoxaparin (LOVENOX) injection 30 mg  30 mg SubCUTAneous Daily Anjana Garcia MD   30 mg at 01/10/22 0955    ondansetron (ZOFRAN-ODT) disintegrating tablet 4 mg  4 mg Oral Q8H PRN Anjana Garcia MD        Or    ondansetron (ZOFRAN) injection 4 mg  4 mg IntraVENous Q6H PRN Anjana Garcia MD        polyethylene glycol (GLYCOLAX) packet 17 g  17 g Oral Daily PRN Anjana Garcia MD        acetaminophen (TYLENOL) tablet 650 mg  650 mg Oral Q6H PRN Anjana Garcia MD        Or    acetaminophen (TYLENOL) suppository 650 mg  650 mg Rectal Q6H PRN Anjana Garcia MD        albuterol (PROVENTIL) nebulizer solution 2.5 mg  2.5 mg Nebulization Q4H PRN Anjana Garcia MD        epoetin tish-epbx (RETACRIT) injection 10,000 Units  10,000 Units SubCUTAneous Once per day on Mon Wed Fri Kayleigh Hill MD   10,000 Units at 01/10/22 1119    hydrALAZINE (APRESOLINE) injection 10 mg  10 mg IntraVENous Q6H PRN Kimmy Andrade MD        insulin lispro (HUMALOG) injection vial 0-6 Units  0-6 Units SubCUTAneous TID WC Anjana Garcia MD        insulin lispro (HUMALOG) injection vial 0-3 Units  0-3 Units SubCUTAneous Nightly Anjana Garcia MD   2 Units at 01/10/22 2320       Past Medical History:  Past Medical History:   Diagnosis Date    Arthritis     Chronic kidney disease, stage 5, kidney failure ([address]     Closed fracture of right shoulder girdle, with routine healing, subsequent encounter     DM (diabetes mellitus) type II controlled with renal manifestation ([address] 06/1993    Gastroparesis     GERD (gastroesophageal reflux disease)     Hemodialysis patient ([facility] [address] [facility]     dialysis on tues, thur, and sat at [facility]    Hypertension     Hypothyroid     Nasal congestion     recent    Neuropathy     Noncompliance with medications     Palliative care patient 10/08/2019    Restless leg syndrome        Past Surgical History:  Past Surgical History:   Procedure Laterality Date    BREAST SURGERY Left 2008    infected milk gland removed    BREAST SURGERY Right 5/25/2021    WEDGE EXCISION RIGHT BREAST DUCT WITH LACRIMAL DUCT PROBE, PEC BLOCK performed by Steven Clifton MD at [address], [facility]      2008    COLONOSCOPY Left 9/17/2020    Dr Hernández Perish hepatic flexure-Severe tortuosity with severe spasming, 1 yr recall    DIALYSIS FISTULA CREATION Left 1/25/2019    REVISION LEFT UPPER EXTREMITY AV ACCESS WITH LEFT BRACHIAL ARTERY TO LEFT AXILLARY VEIN WITH  INTERPOSITIONAL ARTEGRAFT   performed by Parish Sherman MD at [address]  2012    [address] Right 1/25/2019    INSERTION OF RIGHT INTERNAL JUGULAR HEMODIALYSIS CATHETER performed by Parish Sherman MD at [facility]  08/17/2018    1.  Moderately increased stainable iron identified by special stain in Kupffer cells and occasional hepatocytes. Negative for evidence of significant portal or lobular inflammation. Negative for evidence of significant fibrosis    IA COLONOSCOPY W/BIOPSY SINGLE/MULTIPLE N/A 10/20/2017    Dr Deirdre Gaston prep-Tubular AP (-) dysplasia x 2--3 yr recall    IA EGD TRANSORAL BIOPSY SINGLE/MULTIPLE N/A 12/27/2016    Dr Tracey iVeira EGD TRANSORAL BIOPSY SINGLE/MULTIPLE N/A 10/20/2017    Dr Merissa Pierce (-)   [facility] VASCULAR SURGERY Left 2016    fistula by Dr Dariana Beauchamp at [address]  10/02/2017    [facility]. Left upper fistulograms/venograms, balloon angioplasty cephalic vein arch 06A45 conquest.    VASCULAR SURGERY  08/2018    FISTULOGRAM    VASCULAR SURGERY  10/29/2018    [facility]. Left upper fistulograms/venograms    VASCULAR SURGERY  12/19/2018    [facility]. Arch aortogram,left upper arteriograms.  VASCULAR SURGERY  03/06/2019    [facility]. Removal of tunneled dialyis catheter right internal jugular vein.  VASCULAR SURGERY  08/28/2019    [facility]. Left upper extremity fistulogram including venography to the superior vena cava. Balloon angioplasty left subclavian vein with 10mm x 40mm conquest balloon. Balloon angioplasty mid/proximal upper arm cephalic vein with 90QK x 40mm conquest balloon. Venograms after balloon angioplasty. Stent left mid/proximal upper arm cephalic vein stenosis fluency 10mm x 60mm self-expanding covered stent. Balloon     VASCULAR SURGERY  2019    cont angioplasty stent with 10mm x 40mm conquest balloon. Completion venograms left upper extremity.        Family History  Family History   Problem Relation Age of Onset    Colon Cancer Mother          at 63    Colon Polyps Mother     Lung Cancer Father          at 79    Colon Polyps Father     Stomach Cancer Sister     Breast Cancer Sister 27    Depression Daughter 25        committed suicide    Liver Cancer Neg Hx     Liver Disease Neg Hx     Esophageal Cancer Neg Hx     Rectal Cancer Neg Hx        Social History  Social History     Socioeconomic History    Marital status: Single     Spouse name: Not on file    Number of children: 2    Years of education: Not on file    Highest education level: Not on file   Occupational History    Not on file   Tobacco Use    Smoking status: Current Every Day Smoker     Packs/day: 0.50     Years: 33.00     Pack years: 16.50     Types: Cigarettes    Smokeless tobacco: Never Used    Tobacco comment: NOW at 1/4 PD, started at age 24 and has averaged 2 PPD   Vaping Use    Vaping Use: Never used   Substance and Sexual Activity    Alcohol use: No    Drug use: Not Currently     Types: Marijuana Don Safer)    Sexual activity: Not Currently     Partners: Male   Other Topics Concern    Not on file   Social History Narrative    Not on file     Social Determinants of Health     Financial Resource Strain:     Difficulty of Paying Living Expenses: Not on file   Food Insecurity:     Worried About Running Out of Food in the Last Year: Not on file   Prince Espitia Ran Out of Food in the Last Year: Not on file   Transportation Needs:     Lack of Transportation (Medical): Not on file    Lack of Transportation (Non-Medical): Not on file   Physical Activity:     Days of Exercise per Week: Not on file    Minutes of Exercise per Session: Not on file   Stress:     Feeling of Stress : Not on file   Social Connections:     Frequency of Communication with Friends and Family: Not on file    Frequency of Social Gatherings with Friends and Family: Not on file    Attends Hoahaoism Services: Not on file    Active Member of [address] [place] [address] or Organizations: Not on file    Attends Club or Organization Meetings: Not on file    Marital Status: Not on file   Intimate Partner Violence:     Fear of Current or Ex-Partner: Not on file    Emotionally Abused: Not on file    Physically Abused: Not on file    Sexually Abused: Not on file   Housing Stability:     Unable to Pay for Housing in the Last Year: Not on file    Number of [place] in the Last Year: Not on file    Unstable Housing in the Last Year: Not on file         Review of Systems:  History obtained from chart review and the patient  General ROS: No fever or chills  Respiratory ROS: No cough, shortness of breath, wheezing  Cardiovascular ROS: No chest pain or palpitations  Gastrointestinal ROS: No abdominal pain or melena  Genito-Urinary ROS: No dysuria or hematuria  Musculoskeletal ROS: No joint pain or swelling   14 point ROS reviewed with the patient and negative except as noted above and in the HPI unless unable to obtain.     Objective:  Patient Vitals for the past 24 hrs:   BP Temp Temp src Pulse Resp SpO2   01/11/22 0545 (!) 140/62 97.1 °F (36.2 °C) Temporal 69 16 94 %   01/11/22 0045 (!) 143/57 97.8 °F (36.6 °C) Temporal 70 16 97 %   01/10/22 2215 (!) 126/55   72     01/10/22 1740 (!) 144/63 98.8 °F (37.1 °C) Temporal 73  96 %   01/10/22 1515 (!) 162/73        01/10/22 1350 (!) 172/72  Neomia Sayres     01/10/22 1152 Frank Whitney ) 196/88  Lolly.Pea      01/10/22 1149 (!) 198/69 97.4 °F (36.3 °C) Temporal 68 18 97 %   01/10/22 0931 (!) 178/90 97.3 °F (36.3 °C)  63  98 %       Intake/Output Summary (Last 24 hours) at 1/11/2022 0917  Last data filed at 1/10/2022 1522  Gross per 24 hour   Intake 1851.12 ml   Output    Net 1851.12 ml     General: awake/alert   HEENT: Normocephalic atraumatic head  Neck: Supple with no JVD or carotid bruits with  Chest:  clear to auscultation bilaterally  CVS: regular rate and rhythm  Abdominal: soft, nontender, normal bowel sounds  Extremities: no cyanosis or edema  Skin: warm and dry without rash      Labs:  BMP:   Recent Labs     01/10/22  0248 01/11/22 0408    132*   K 3.0* 3.6   CL 95* 92*   CO2 24 25   BUN 35* 44*   CREATININE 4.0* 5.3*   CALCIUM 8.9 8.3*     CBC:   Recent Labs     01/10/22  0248 01/11/22 0408   WBC 4.7* 4.9   HGB 8.1* 7.9*   HCT 27.5* 25.9*   MCV 97.2 94.2    139     LIVER PROFILE:   Recent Labs     01/10/22  0248 01/11/22 0408   AST 23 18   ALT 14 12   BILITOT 0.3 0.3   ALKPHOS 98 99     PT/INR: No results for input(s): PROTIME, INR in the last 72 hours. APTT: No results for input(s): APTT in the last 72 hours. BNP:  No results for input(s): BNP in the last 72 hours. Ionized Calcium:No results for input(s): IONCA in the last 72 hours. Magnesium:  Recent Labs     01/10/22  0248   MG 2.4     Phosphorus:No results for input(s): PHOS in the last 72 hours. HgbA1C:   Recent Labs     01/11/22 0408   LABA1C 12.4*     Hepatic:   Recent Labs     01/10/22  0248 01/11/22 0408   ALKPHOS 98 99   ALT 14 12   AST 23 18   PROT 6.9 5.8*   BILITOT 0.3 0.3   LABALBU 3.9 3.6     Lactic Acid: No results for input(s): LACTA in the last 72 hours. Troponin: No results for input(s): CKTOTAL, CKMB, TROPONINT in the last 72 hours. ABGs: No results for input(s): PH, PCO2, PO2, HCO3, O2SAT in the last 72 hours. CRP:  No results for input(s): CRP in the last 72 hours.   Sed Rate:  No results for input(s): SEDRATE in the last 72 hours. Cultures:   No results for input(s): CULTURE in the last 72 hours. No results for input(s): BC, Paullette Butt in the last 72 hours. No results for input(s): CXSURG in the last 72 hours. Radiology reports as per the Radiologist  Radiology: XR CHEST PORTABLE    Result Date: 1/10/2022  Examination. XR CHEST PORTABLE 1/10/2022 5:37 AM History: Altered mental status. Hypoglycemia. A single frontal portable semiupright view of the chest is compared with the previous study dated 1/1/2022. The lungs are moderately expanded. There is no evidence of recent infiltrate, pleural effusion, pulmonary congestion or pneumothorax. There is moderate cardiomegaly. No acute bony abnormality. A left-sided subclavian vascular graft is seen in place. 1. No active cardiopulmonary disease. 2. A moderate cardiomegaly. Signed by Dr Kam Weston   1. End-stage renal disease currently seen on hemodialysis. 2.  Type II diabetic nephropathy. 3.  Severe hypoglycemia. 4.  Anemia of chronic kidney disease. 5.  Poor medical compliance. 6.  Secondary hyperparathyroidism. Plan:  1. Tolerating dialysis well. 2.  Continue LANDON.         Fabby Mayes MD  01/11/22  9:17 AM

## 2022-01-11 NOTE — PLAN OF CARE
Problem: Falls - Risk of:  Goal: Will remain free from falls  Description: Will remain free from falls  1/11/2022 0228 by Wojciech Toth RN  Outcome: Ongoing  1/10/2022 1727 by Jovanna Jackson RN  Outcome: Ongoing  Goal: Absence of physical injury  Description: Absence of physical injury  1/11/2022 0228 by Wojciech Toth RN  Outcome: Ongoing  1/10/2022 1727 by Jovanna Jackson RN  Outcome: Ongoing

## 2022-01-11 NOTE — CARE COORDINATION
Went and spoke with pt about hosptial re-admission. Pt went home with boyfriend and HH was set up. Pt stated she thought she was doing good at home. HH was coming out and pt was going to HD as directed. Pt stated that she was being compliant with her medication, but her sugar got low and she couldn't get it back up. That is why she came back to the hospital.  Pt stated she did [facility] to her [facility] . Pt was educated again about diet and medication administration. Pt will follow up with her pcp.    Electronically signed by Joce Farley RN on 1/11/2022 at 3:44 PM

## 2022-01-11 NOTE — CARE COORDINATION
SW placed call to [employer] at Pt's request to schedule her a Medicaid ride to her MD appt on 1/19/22 @[address] [place] [address]; [phone]; confirmation for pick-up at home #[id number] and pick-up at appt #[id number]    Electronically signed by Diana Lloyd on 1/11/2022 at 3:54 PM

## 2022-01-12 VITALS
TEMPERATURE: 97.5 F | HEIGHT: 66 IN | SYSTOLIC BLOOD PRESSURE: 163 MMHG | HEART RATE: 69 BPM | WEIGHT: 155 LBS | OXYGEN SATURATION: 95 % | BODY MASS INDEX: 24.91 KG/M2 | DIASTOLIC BLOOD PRESSURE: 70 MMHG | RESPIRATION RATE: 18 BRPM

## 2022-01-12 LAB
ALBUMIN SERPL-MCNC: 3.5 G/DL (ref 3.5–5.2)
ALP BLD-CCNC: 102 U/L (ref 35–104)
ALT SERPL-CCNC: 12 U/L (ref 5–33)
ANION GAP SERPL CALCULATED.3IONS-SCNC: 12 MMOL/L (ref 7–19)
AST SERPL-CCNC: 17 U/L (ref 5–32)
BASOPHILS ABSOLUTE: 0.1 K/UL (ref 0–0.2)
BASOPHILS RELATIVE PERCENT: 1.8 % (ref 0–1)
BILIRUB SERPL-MCNC: 0.4 MG/DL (ref 0.2–1.2)
BUN BLDV-MCNC: 25 MG/DL (ref 6–20)
CALCIUM SERPL-MCNC: 8.4 MG/DL (ref 8.6–10)
CHLORIDE BLD-SCNC: 96 MMOL/L (ref 98–111)
CO2: 28 MMOL/L (ref 22–29)
CREAT SERPL-MCNC: 3.4 MG/DL (ref 0.5–0.9)
EOSINOPHILS ABSOLUTE: 0.2 K/UL (ref 0–0.6)
EOSINOPHILS RELATIVE PERCENT: 4.8 % (ref 0–5)
GFR AFRICAN AMERICAN: 17
GFR NON-AFRICAN AMERICAN: 14
GLUCOSE BLD-MCNC: 189 MG/DL (ref 74–109)
GLUCOSE BLD-MCNC: 246 MG/DL (ref 70–99)
GLUCOSE BLD-MCNC: 253 MG/DL (ref 70–99)
HCT VFR BLD CALC: 26.2 % (ref 37–47)
HEMOGLOBIN: 7.9 G/DL (ref 12–16)
IMMATURE GRANULOCYTES #: 0 K/UL
LYMPHOCYTES ABSOLUTE: 1 K/UL (ref 1.1–4.5)
LYMPHOCYTES RELATIVE PERCENT: 25.6 % (ref 20–40)
MCH RBC QN AUTO: 28.2 PG (ref 27–31)
MCHC RBC AUTO-ENTMCNC: 30.2 G/DL (ref 33–37)
MCV RBC AUTO: 93.6 FL (ref 81–99)
MONOCYTES ABSOLUTE: 0.5 K/UL (ref 0–0.9)
MONOCYTES RELATIVE PERCENT: 11.6 % (ref 0–10)
NEUTROPHILS ABSOLUTE: 2.2 K/UL (ref 1.5–7.5)
NEUTROPHILS RELATIVE PERCENT: 55.9 % (ref 50–65)
PDW BLD-RTO: 14.8 % (ref 11.5–14.5)
PERFORMED ON: ABNORMAL
PERFORMED ON: ABNORMAL
PLATELET # BLD: 129 K/UL (ref 130–400)
PMV BLD AUTO: 9.5 FL (ref 9.4–12.3)
POTASSIUM SERPL-SCNC: 3.8 MMOL/L (ref 3.5–5)
RBC # BLD: 2.8 M/UL (ref 4.2–5.4)
SODIUM BLD-SCNC: 136 MMOL/L (ref 136–145)
TOTAL PROTEIN: 6.5 G/DL (ref 6.6–8.7)
WBC # BLD: 4 K/UL (ref 4.8–10.8)

## 2022-01-12 PROCEDURE — 2580000003 HC RX 258: Performed by: INTERNAL MEDICINE

## 2022-01-12 PROCEDURE — 36415 COLL VENOUS BLD VENIPUNCTURE: CPT

## 2022-01-12 PROCEDURE — 6360000002 HC RX W HCPCS: Performed by: INTERNAL MEDICINE

## 2022-01-12 PROCEDURE — 85025 COMPLETE CBC W/AUTO DIFF WBC: CPT

## 2022-01-12 PROCEDURE — G0378 HOSPITAL OBSERVATION PER HR: HCPCS

## 2022-01-12 PROCEDURE — 96372 THER/PROPH/DIAG INJ SC/IM: CPT

## 2022-01-12 PROCEDURE — 80053 COMPREHEN METABOLIC PANEL: CPT

## 2022-01-12 PROCEDURE — 6370000000 HC RX 637 (ALT 250 FOR IP): Performed by: INTERNAL MEDICINE

## 2022-01-12 PROCEDURE — 82947 ASSAY GLUCOSE BLOOD QUANT: CPT

## 2022-01-12 RX ORDER — INSULIN GLARGINE 100 [IU]/ML
5 INJECTION, SOLUTION SUBCUTANEOUS NIGHTLY
Qty: 1 PEN | Refills: 0 | Status: SHIPPED | OUTPATIENT
Start: 2022-01-12

## 2022-01-12 RX ORDER — INSULIN ASPART 100 [IU]/ML
4 INJECTION, SOLUTION INTRAVENOUS; SUBCUTANEOUS
Qty: 5 PEN | Refills: 0 | Status: ON HOLD | OUTPATIENT
Start: 2022-01-12 | End: 2022-05-25 | Stop reason: HOSPADM

## 2022-01-12 RX ADMIN — HYDRALAZINE HYDROCHLORIDE 100 MG: 50 TABLET, FILM COATED ORAL at 00:58

## 2022-01-12 RX ADMIN — LEVOTHYROXINE SODIUM 150 MCG: 75 TABLET ORAL at 06:04

## 2022-01-12 RX ADMIN — LISINOPRIL 20 MG: 20 TABLET ORAL at 09:01

## 2022-01-12 RX ADMIN — ATORVASTATIN CALCIUM 20 MG: 20 TABLET, FILM COATED ORAL at 09:01

## 2022-01-12 RX ADMIN — EPOETIN ALFA-EPBX 10000 UNITS: 10000 INJECTION, SOLUTION INTRAVENOUS; SUBCUTANEOUS at 09:02

## 2022-01-12 RX ADMIN — INSULIN LISPRO 2 UNITS: 100 INJECTION, SOLUTION INTRAVENOUS; SUBCUTANEOUS at 12:14

## 2022-01-12 RX ADMIN — SEVELAMER CARBONATE 800 MG: 800 TABLET, FILM COATED ORAL at 12:14

## 2022-01-12 RX ADMIN — DULOXETINE 60 MG: 60 CAPSULE, DELAYED RELEASE ORAL at 09:02

## 2022-01-12 RX ADMIN — SEVELAMER CARBONATE 800 MG: 800 TABLET, FILM COATED ORAL at 09:01

## 2022-01-12 RX ADMIN — INSULIN LISPRO 3 UNITS: 100 INJECTION, SOLUTION INTRAVENOUS; SUBCUTANEOUS at 09:08

## 2022-01-12 RX ADMIN — NIFEDIPINE 90 MG: 90 TABLET, FILM COATED, EXTENDED RELEASE ORAL at 09:02

## 2022-01-12 RX ADMIN — ISOSORBIDE MONONITRATE 120 MG: 60 TABLET, EXTENDED RELEASE ORAL at 09:01

## 2022-01-12 RX ADMIN — METOPROLOL SUCCINATE 150 MG: 50 TABLET, EXTENDED RELEASE ORAL at 06:04

## 2022-01-12 RX ADMIN — HYDRALAZINE HYDROCHLORIDE 100 MG: 50 TABLET, FILM COATED ORAL at 09:01

## 2022-01-12 RX ADMIN — SENNOSIDES 8.6 MG: 8.6 TABLET, COATED ORAL at 09:01

## 2022-01-12 RX ADMIN — SODIUM CHLORIDE, PRESERVATIVE FREE 10 ML: 5 INJECTION INTRAVENOUS at 09:03

## 2022-01-12 ASSESSMENT — PAIN SCALES - GENERAL: PAINLEVEL_OUTOF10: 0

## 2022-01-12 ASSESSMENT — PAIN SCALES - WONG BAKER: WONGBAKER_NUMERICALRESPONSE: 0

## 2022-01-12 NOTE — PROGRESS NOTES
Nephrology ([facility]) Progress Note      Patient:  Shyanne Miller  YOB: 1970  Date of Service: 1/12/2022  MRN: [id number]   Acct: [de-identified]   Primary Care Physician: ROMINA Newman  Advance Directive: Full Code  Admit Date: 1/10/2022       Hospital Day: 0  Referring Provider: Tammie Lenz MD    Patient independently seen and examined, Chart, Consults, Notes, Operative notes, Labs, Cardiology, and Radiology studies reviewed as available. Chief complaint: Weakness/hypoglycemia/end-stage renal disease. Subjective:  Shyanne Miller is a 45 y.o. female for whom we were consulted for evaluation and treatment of end-stage renal disease. Patient has end-stage renal disease due to diabetic nephropathy. Patient is fairly noncompliant dialysis patient and missed treatment. Presented with profound weakness, lack of energy and disorientation. She was found to have blood sugar less than 50 mg. He was treated with intravenous D10. She has type 2 diabetes insulin-dependent. He is currently staying home, no longer at nursing home and has been discharged from nursing home. Patient denies any chest pain or shortness of breath she denies any nausea vomiting or diarrhea. This morning patient feels well and hoping to go home soon.     Allergies:  Penicillins, Lamisil advanced [terbinafine], Reglan [metoclopramide], and Stadol [butorphanol]    Medicines:  Current Facility-Administered Medications   Medication Dose Route Frequency Provider Last Rate Last Admin    glucose (GLUTOSE) 40 % oral gel 15 g  15 g Oral PRN Tammie Lenz MD        dextrose 50 % IV solution  12.5 g IntraVENous PRN Tammie Lenz MD        glucagon (rDNA) injection 1 mg  1 mg IntraMUSCular PRN Tammie Lenz MD        dextrose 5 % solution  100 mL/hr IntraVENous PRN Tammie Lenz MD        insulin lispro (HUMALOG) injection vial 0-6 Units  0-6 Units SubCUTAneous TID WC Tammie Lenz MD   2 Units at 01/11/22 1732    insulin lispro (HUMALOG) injection vial 0-3 Units  0-3 Units SubCUTAneous Nightly Annia Ricci MD   1 Units at 01/11/22 2158    levoFLOXacin (LEVAQUIN) 500 MG/100ML infusion 500 mg  500 mg IntraVENous Q48H Annia Ricci MD   Stopped at 01/11/22 1727    cloNIDine (CATAPRES) 0.1 MG/24HR 1 patch  1 patch TransDERmal Weekly Molly Jones MD   1 patch at 01/10/22 0955    DULoxetine (CYMBALTA) extended release capsule 60 mg  60 mg Oral Daily Molly Jones MD   60 mg at 01/12/22 0902    vitamin D (ERGOCALCIFEROL) capsule 50,000 Units  50,000 Units Oral Q14 Days Molly Jones MD   50,000 Units at 01/10/22 0955    hydrALAZINE (APRESOLINE) tablet 100 mg  100 mg Oral 3 times per day Molly Jones MD   100 mg at 01/12/22 0901    HYDROcodone-acetaminophen (NORCO) 5-325 MG per tablet 1 tablet  1 tablet Oral Q6H PRN Molly Jones MD        isosorbide mononitrate (IMDUR) extended release tablet 120 mg  120 mg Oral Daily Molly Jones MD   120 mg at 01/12/22 0901    lactulose (CHRONULAC) 10 GM/15ML solution 15.3333 g  15.3333 g Oral TID Molly Jones MD   15.3333 g at 01/11/22 2157    levothyroxine (SYNTHROID) tablet 150 mcg  150 mcg Oral Daily Molly Jones MD   150 mcg at 01/12/22 0604    lisinopril (PRINIVIL;ZESTRIL) tablet 20 mg  20 mg Oral BID Molly Jones MD   20 mg at 01/12/22 0901    metoprolol succinate (TOPROL XL) extended release tablet 150 mg  150 mg Oral QAM AC Molly Jones MD   150 mg at 01/12/22 0604    nicotine (NICODERM CQ) 14 MG/24HR 1 patch  1 patch TransDERmal Daily Molly Jones MD   1 patch at 01/11/22 1220    NIFEdipine (PROCARDIA XL) extended release tablet 90 mg  90 mg Oral Daily Molly Jones MD   90 mg at 01/12/22 0902    rOPINIRole (REQUIP) tablet 4 mg  4 mg Oral Nightly Molly Jones MD   4 mg at 01/11/22 5093    senna ([address]) tablet 8.6 mg  1 tablet Oral Daily Win Liu MD   8.6 mg at 01/12/22 0901    sevelamer (RENVELA) tablet 800 mg  800 mg Oral TID WC Win Liu MD   800 mg at 01/12/22 0901    atorvastatin (LIPITOR) tablet 20 mg  20 mg Oral Daily Win Liu MD   20 mg at 01/12/22 0901    sodium chloride flush 0.9 % injection 5-40 mL  5-40 mL IntraVENous 2 times per day Win Liu MD   10 mL at 01/12/22 0903    sodium chloride flush 0.9 % injection 5-40 mL  5-40 mL IntraVENous PRN Win Liu MD        0.9 % sodium chloride infusion  25 mL IntraVENous PRN Win Liu  mL/hr at 01/11/22 1623 25 mL at 01/11/22 1623    enoxaparin (LOVENOX) injection 30 mg  30 mg SubCUTAneous Daily Win Liu MD   30 mg at 01/11/22 1214    ondansetron (ZOFRAN-ODT) disintegrating tablet 4 mg  4 mg Oral Q8H PRN Win Liu MD        Or    ondansetron (ZOFRAN) injection 4 mg  4 mg IntraVENous Q6H PRN Win Liu MD        polyethylene glycol (GLYCOLAX) packet 17 g  17 g Oral Daily PRN Win Liu MD        acetaminophen (TYLENOL) tablet 650 mg  650 mg Oral Q6H PRN Win Liu MD        Or    acetaminophen (TYLENOL) suppository 650 mg  650 mg Rectal Q6H PRN Win Liu MD        albuterol (PROVENTIL) nebulizer solution 2.5 mg  2.5 mg Nebulization Q4H PRN Win Liu MD        epoetin tish-epbx (RETACRIT) injection 10,000 Units  10,000 Units SubCUTAneous Once per day on Mon Wed Fri Eliecer Figueroa MD   10,000 Units at 01/12/22 0902    hydrALAZINE (APRESOLINE) injection 10 mg  10 mg IntraVENous Q6H PRN Itzel Vivar MD           Past Medical History:  Past Medical History:   Diagnosis Date    Arthritis     Chronic kidney disease, stage 5, kidney failure [facility] [address]     Closed fracture of right shoulder girdle, with routine healing, subsequent encounter     DM (diabetes mellitus) type II controlled with renal manifestation ([facility] [address] 06/1993    Gastroparesis     GERD (gastroesophageal reflux disease)     Hemodialysis patient ([facility] [address]     dialysis on tuwatson, thur, and sat at [facility]    Hypertension     Hypothyroid     Nasal congestion     recent    Neuropathy     Noncompliance with medications     Palliative care patient 10/08/2019    Restless leg syndrome        Past Surgical History:  Past Surgical History:   Procedure Laterality Date    BREAST SURGERY Left 2008    infected milk gland removed    BREAST SURGERY Right 5/25/2021    WEDGE EXCISION RIGHT BREAST DUCT WITH LACRIMAL DUCT PROBE, PEC BLOCK performed [facility] Aurelio Quinn [facility]      2008    COLONOSCOPY Left 9/17/2020    Dr Pau Ellis hepatic flexure-Severe tortuosity with severe spasming, 1 yr recall    DIALYSIS FISTULA CREATION Left 1/25/2019    REVISION LEFT UPPER EXTREMITY AV ACCESS WITH LEFT BRACHIAL ARTERY TO LEFT AXILLARY VEIN WITH  INTERPOSITIONAL ARTEGRAFT   performed by Sho Chang MD at [address]  2012    [address] Right 1/25/2019    INSERTION OF RIGHT INTERNAL JUGULAR HEMODIALYSIS CATHETER performed by Sho Chang MD at [facility]  08/17/2018    1.  Moderately increased stainable iron identified by special stain in Kupffer cells and occasional hepatocytes. Negative for evidence of significant portal or lobular inflammation. Negative for evidence of significant fibrosis    CA COLONOSCOPY W/BIOPSY SINGLE/MULTIPLE N/A 10/20/2017    Dr Crystal Guardado prep-Tubular AP (-) dysplasia x 2--3 yr recall    CA EGD TRANSORAL BIOPSY SINGLE/MULTIPLE N/A 12/27/2016    Dr Danelle Nathan EGD TRANSORAL BIOPSY SINGLE/MULTIPLE N/A 10/20/2017    Dr Yohan Shukla (-)   [facility] VASCULAR SURGERY Left 2016    fistula by Dr Jacqlyn Gottron at [address]  10/02/2017    [facility]. Left upper fistulograms/venograms, balloon angioplasty cephalic vein arch 43R30 conquest.    VASCULAR SURGERY  2018    FISTULOGRAM    VASCULAR SURGERY  10/29/2018    [facility]. Left upper fistulograms/venograms    VASCULAR SURGERY  2018    [facility]. Arch aortogram,left upper arteriograms.  VASCULAR SURGERY  2019    [facility]. Removal of tunneled dialyis catheter right internal jugular vein.  VASCULAR SURGERY  2019    [facility]. Left upper extremity fistulogram including venography to the superior vena cava. Balloon angioplasty left subclavian vein with 10mm x 40mm conquest balloon. Balloon angioplasty mid/proximal upper arm cephalic vein with 51XS x 40mm conquest balloon. Venograms after balloon angioplasty. Stent left mid/proximal upper arm cephalic vein stenosis fluency 10mm x 60mm self-expanding covered stent. Balloon     VASCULAR SURGERY  2019    cont angioplasty stent with 10mm x 40mm conquest balloon. Completion venograms left upper extremity. Family History  Family History   Problem Relation Age of Onset    Colon Cancer Mother          at 63    Colon Polyps Mother     Lung Cancer Father          at 79    Colon Polyps Father     Stomach Cancer Sister     Breast Cancer Sister 27    Depression Daughter 25        committed suicide    Liver Cancer Neg Hx     Liver Disease Neg Hx     Esophageal Cancer Neg Hx     Rectal Cancer Neg Hx        Social History  Social History     Socioeconomic History    Marital status: Single     Spouse name: Not on file    Number of children: 2    Years of education: Not on file    Highest education level: Not on file   Occupational History    Not on file   Tobacco Use    Smoking status: Current Every Day Smoker     Packs/day: 0.50     Years: 33.00     Pack years: 16.50     Types: Cigarettes    Smokeless tobacco: Never Used    Tobacco comment: NOW at 1/4 PD, started at age 24 and has averaged 2 PPD   Vaping Use    Vaping Use: Never used   Substance and Sexual Activity    Alcohol use:  No  Drug use: Not Currently     Types: Marijuana Nicole Bachelor)    Sexual activity: Not Currently     Partners: Male   Other Topics Concern    Not on file   Social History Narrative    Not on file     Social Determinants of Health     Financial Resource Strain:     Difficulty of Paying Living Expenses: Not on file   Food Insecurity:     Worried About Running Out of Food in the Last Year: Not on file    Kendy of Food in the Last Year: Not on file   Transportation Needs:     Lack of Transportation (Medical): Not on file    Lack of Transportation (Non-Medical):  Not on file   Physical Activity:     Days of Exercise per Week: Not on file    Minutes of Exercise per Session: Not on file   Stress:     Feeling of Stress : Not on file   Social Connections:     Frequency of Communication with Friends and Family: Not on file    Frequency of Social Gatherings with Friends and Family: Not on file    Attends Latter-day Services: Not on file    Active Member of [address] [place] [address] or Organizations: Not on file    Attends Club or Organization Meetings: Not on file    Marital Status: Not on file   Intimate Partner Violence:     Fear of Current or Ex-Partner: Not on file    Emotionally Abused: Not on file    Physically Abused: Not on file    Sexually Abused: Not on file   Housing Stability:     Unable to Pay for Housing in the Last Year: Not on file    Number of [place] in the Last Year: Not on file    Unstable Housing in the Last Year: Not on file         Review of Systems:  History obtained from chart review and the patient  General ROS: No fever or chills  Respiratory ROS: No cough, shortness of breath, wheezing  Cardiovascular ROS: No chest pain or palpitations  Gastrointestinal ROS: No abdominal pain or melena  Genito-Urinary ROS: No dysuria or hematuria  Musculoskeletal ROS: No joint pain or swelling   14 point ROS reviewed with the patient and negative except as noted above and in the HPI unless unable to obtain. Objective:  Patient Vitals for the past 24 hrs:   BP Temp Temp src Pulse Resp SpO2   01/12/22 0534 (!) 163/70 97.5 °F (36.4 °C) Temporal 69 18 95 %   01/12/22 0035 (!) 157/70 97.7 °F (36.5 °C) Temporal 65 18 97 %   01/11/22 1648 (!) 160/76 98.6 °F (37 °C) Temporal 76 16 97 %   01/11/22 1130 (!) 162/64 98.9 °F (37.2 °C) Temporal 81 16 97 %       Intake/Output Summary (Last 24 hours) at 1/12/2022 0942  Last data filed at 1/11/2022 1156  Gross per 24 hour   Intake    Output 3000 ml   Net -3000 ml     General: awake/alert   HEENT: Normocephalic atraumatic head  Neck: Supple with no JVD or carotid bruits with  Chest:  clear to auscultation bilaterally  CVS: regular rate and rhythm  Abdominal: soft, nontender, normal bowel sounds  Extremities: no cyanosis or edema  Skin: warm and dry without rash      Labs:  BMP:   Recent Labs     01/10/22  0248 01/11/22  0408 01/12/22  0306    132* 136   K 3.0* 3.6 3.8   CL 95* 92* 96*   CO2 24 25 28   BUN 35* 44* 25*   CREATININE 4.0* 5.3* 3.4*   CALCIUM 8.9 8.3* 8.4*     CBC:   Recent Labs     01/10/22  0248 01/11/22  0408 01/12/22  0306   WBC 4.7* 4.9 4.0*   HGB 8.1* 7.9* 7.9*   HCT 27.5* 25.9* 26.2*   MCV 97.2 94.2 93.6    139 129*     LIVER PROFILE:   Recent Labs     01/10/22  0248 01/11/22  0408 01/12/22  0306   AST 23 18 17   ALT 14 12 12   BILITOT 0.3 0.3 0.4   ALKPHOS 98 99 102     PT/INR: No results for input(s): PROTIME, INR in the last 72 hours. APTT: No results for input(s): APTT in the last 72 hours. BNP:  No results for input(s): BNP in the last 72 hours. Ionized Calcium:No results for input(s): IONCA in the last 72 hours. Magnesium:  Recent Labs     01/10/22  0248   MG 2.4     Phosphorus:No results for input(s): PHOS in the last 72 hours.   HgbA1C:   Recent Labs     01/11/22  0408   LABA1C 12.4*     Hepatic:   Recent Labs     01/10/22  0248 01/11/22  0408 01/12/22  0306   ALKPHOS 98 99 102   ALT 14 12 12   AST 23 18 17   PROT 6.9 5.8* 6.5* BILITOT 0.3 0.3 0.4   LABALBU 3.9 3.6 3.5     Lactic Acid: No results for input(s): LACTA in the last 72 hours. Troponin: No results for input(s): CKTOTAL, CKMB, TROPONINT in the last 72 hours. ABGs: No results for input(s): PH, PCO2, PO2, HCO3, O2SAT in the last 72 hours. CRP:  No results for input(s): CRP in the last 72 hours. Sed Rate:  No results for input(s): SEDRATE in the last 72 hours. Cultures:   No results for input(s): CULTURE in the last 72 hours. Recent Labs     01/10/22  1744 01/10/22  1812   BC No Growth to date. Any change in status will be called. --    BLOODCULT2  --  No Growth to date. Any change in status will be called. No results for input(s): CXSURG in the last 72 hours. Radiology reports as per the Radiologist  Radiology: XR CHEST PORTABLE    Result Date: 1/10/2022  Examination. XR CHEST PORTABLE 1/10/2022 5:37 AM History: Altered mental status. Hypoglycemia. A single frontal portable semiupright view of the chest is compared with the previous study dated 1/1/2022. The lungs are moderately expanded. There is no evidence of recent infiltrate, pleural effusion, pulmonary congestion or pneumothorax. There is moderate cardiomegaly. No acute bony abnormality. A left-sided subclavian vascular graft is seen in place. 1. No active cardiopulmonary disease. 2. A moderate cardiomegaly. Signed by Dr Agatha Odell   1. End-stage renal disease/maintenance dialysis treatment. 2.  Type II diabetic nephropathy. 3.  Severe hypoglycemia. 4.  Anemia of chronic kidney disease. 5.  Poor medical compliance. 6.  Secondary hyperparathyroidism. Plan:  1. Routine hemodialysis TTS. 2.  Continue LANDON. 3.  Possible discharge to home soon.         Jose Rodriguez MD  01/12/22  9:42 AM

## 2022-01-12 NOTE — DISCHARGE SUMMARY
Admission Diagnosis:  Chief Complaint   Patient presents with    Hypoglycemia     Patient bs on EMS arrival was 23. Patient given 150 of D10 by EMS. Last EMS bs was 101. Comorbid conditions: Active Problems:    Hypoglycemia associated with diabetes ([facility] [address]  Resolved Problems:    * No resolved hospital problems. *      Procedures Performed:  None    Hospital Course:  Pt admitted for hypoglycemia. Pt is very poorly controlled diabetic, A1c 12. Frequently admitted for hyperglycemia, poorly controlled HTN, missed dialysis. Pt is grossly non-compliant. High risk of death from non-compliance. Pt's hypoglycemia is now resolved. Wishes to go home. [facility] has an appt with PCP to follow-up tomorrow. Advised to improve compliance.         DC Medications:  See Home medication reconciliation    Condition on Discharge:  Stable    Disposition:  DC home    Recommendations/Follow-up:  -F/u PCP within 3 days      Jenna Dominguez M.D.,  1/12/2022

## 2022-01-12 NOTE — CONSULTS
Comprehensive Nutrition Assessment    Type and Reason for Visit:  Initial,Patient Education    Nutrition Recommendations/Plan: follow for questions re: menus/education    Nutrition Assessment:  Nursing staff/ stopped me and asked about DM education with diabetes educator. Explained we do not have that service. If diet ed is needed to contact us. They asked me to visit with pt. Diet hx obtained. Pt states she has been trying to be good and really watch her portions, etc.  If hx was accurate, pt has been cutting herself back to far. Blood sugars fall so compensates and then they go too high. Menus given for example of how to plan meals, portion sizes etc.    Malnutrition Assessment:  Malnutrition Status:  No malnutrition    Context:  Acute Illness     Findings of the 6 clinical characteristics of malnutrition:  Energy Intake:  Mild decrease in energy intake (Comment)  Weight Loss:  Unable to assess     Body Fat Loss:  No significant body fat loss     Muscle Mass Loss:  No significant muscle mass loss    Fluid Accumulation:  1 - Mild Generalized   Strength:  Not Performed    Nutrition Related Findings:  A1C 12.4      Wounds:  None       Current Nutrition Therapies:    ADULT DIET; Easy to Chew; 3 carb choices (45 gm/meal); Low Sodium (2 gm); Low Potassium (Less than 3000 mg/day)    Anthropometric Measures:  · Height: 5' 6\" (167.6 cm)  · Current Body Weight: 155 lb (70.3 kg)   · Admission Body Weight: 155 lb (70.3 kg)    · Usual Body Weight:       · Ideal Body Weight: 130 lbs; % Ideal Body Weight 119.2 %   · BMI: 25  · Adjusted Body Weight:  ; No Adjustment    · BMI Categories: Overweight (BMI 25.0-29. 9)       Nutrition Diagnosis:   · Altered nutrition-related lab values related to endocrine dysfuntion,other (comment) (missed dialysis treatment, some meal noncompliance) as evidenced by lab values      Nutrition Interventions:   Food and/or Nutrient Delivery:  Continue Current Diet  Nutrition

## 2022-01-12 NOTE — PROGRESS NOTES
This nurse educated the pt on the importance of controlling her blood sugar and watching what she is eating. Pt reiterated that she had talked with the dietician and was given menu examples of how to plan her meals. This nurse also educated pt on when she needs to be checking her blood sugar. Pt was agreeable to all education given.      Jaquan Medina RN

## 2022-01-14 LAB
ORGANISM: ABNORMAL
ORGANISM: ABNORMAL
URINE CULTURE, ROUTINE: ABNORMAL

## 2022-01-15 LAB
BLOOD CULTURE, ROUTINE: NORMAL
CULTURE, BLOOD 2: NORMAL

## 2022-01-22 ENCOUNTER — APPOINTMENT (OUTPATIENT)
Dept: GENERAL RADIOLOGY | Age: 52
DRG: 533 | End: 2022-01-22
Payer: MEDICARE

## 2022-01-22 ENCOUNTER — HOSPITAL ENCOUNTER (INPATIENT)
Age: 52
LOS: 5 days | Discharge: NURSING, REHAB OR LONG-TERM CARE | DRG: 533 | End: 2022-01-27
Attending: EMERGENCY MEDICINE | Admitting: STUDENT IN AN ORGANIZED HEALTH CARE EDUCATION/TRAINING PROGRAM
Payer: MEDICARE

## 2022-01-22 DIAGNOSIS — M54.16 LUMBAR RADICULOPATHY: ICD-10-CM

## 2022-01-22 DIAGNOSIS — S72.92XA CLOSED FRACTURE OF LEFT FEMUR, UNSPECIFIED FRACTURE MORPHOLOGY, UNSPECIFIED PORTION OF FEMUR, INITIAL ENCOUNTER (HCC): Primary | ICD-10-CM

## 2022-01-22 DIAGNOSIS — N64.52 DISCHARGE FROM NIPPLE: ICD-10-CM

## 2022-01-22 DIAGNOSIS — I10 ESSENTIAL HYPERTENSION: ICD-10-CM

## 2022-01-22 DIAGNOSIS — R73.9 HYPERGLYCEMIA: ICD-10-CM

## 2022-01-22 PROBLEM — S72.402A CLOSED FRACTURE OF LEFT DISTAL FEMUR (HCC): Status: ACTIVE | Noted: 2022-01-22

## 2022-01-22 PROBLEM — S72.402A CLOSED FRACTURE OF DISTAL END OF LEFT FEMUR, INITIAL ENCOUNTER (HCC): Status: ACTIVE | Noted: 2022-01-22

## 2022-01-22 LAB
ALBUMIN SERPL-MCNC: 4.2 G/DL (ref 3.5–5.2)
ALP BLD-CCNC: 143 U/L (ref 35–104)
ALT SERPL-CCNC: 18 U/L (ref 5–33)
ANION GAP SERPL CALCULATED.3IONS-SCNC: 13 MMOL/L (ref 7–19)
AST SERPL-CCNC: 14 U/L (ref 5–32)
BASOPHILS ABSOLUTE: 0.1 K/UL (ref 0–0.2)
BASOPHILS RELATIVE PERCENT: 1.4 % (ref 0–1)
BILIRUB SERPL-MCNC: 0.3 MG/DL (ref 0.2–1.2)
BUN BLDV-MCNC: 35 MG/DL (ref 6–20)
CALCIUM SERPL-MCNC: 9.6 MG/DL (ref 8.6–10)
CHLORIDE BLD-SCNC: 88 MMOL/L (ref 98–111)
CO2: 29 MMOL/L (ref 22–29)
CREAT SERPL-MCNC: 3.7 MG/DL (ref 0.5–0.9)
EOSINOPHILS ABSOLUTE: 0.3 K/UL (ref 0–0.6)
EOSINOPHILS RELATIVE PERCENT: 3.8 % (ref 0–5)
GFR AFRICAN AMERICAN: 16
GFR NON-AFRICAN AMERICAN: 13
GLUCOSE BLD-MCNC: 138 MG/DL (ref 70–99)
GLUCOSE BLD-MCNC: 288 MG/DL (ref 70–99)
GLUCOSE BLD-MCNC: 468 MG/DL (ref 70–99)
GLUCOSE BLD-MCNC: 507 MG/DL (ref 70–99)
GLUCOSE BLD-MCNC: 562 MG/DL (ref 74–109)
HBA1C MFR BLD: 10.8 % (ref 4–6)
HCT VFR BLD CALC: 26.6 % (ref 37–47)
HEMOGLOBIN: 8.2 G/DL (ref 12–16)
IMMATURE GRANULOCYTES #: 0 K/UL
LYMPHOCYTES ABSOLUTE: 0.8 K/UL (ref 1.1–4.5)
LYMPHOCYTES RELATIVE PERCENT: 11.6 % (ref 20–40)
MCH RBC QN AUTO: 30.5 PG (ref 27–31)
MCHC RBC AUTO-ENTMCNC: 30.8 G/DL (ref 33–37)
MCV RBC AUTO: 98.9 FL (ref 81–99)
MONOCYTES ABSOLUTE: 0.5 K/UL (ref 0–0.9)
MONOCYTES RELATIVE PERCENT: 7.2 % (ref 0–10)
NEUTROPHILS ABSOLUTE: 5 K/UL (ref 1.5–7.5)
NEUTROPHILS RELATIVE PERCENT: 75.7 % (ref 50–65)
PDW BLD-RTO: 18.3 % (ref 11.5–14.5)
PERFORMED ON: ABNORMAL
PLATELET # BLD: 128 K/UL (ref 130–400)
PMV BLD AUTO: 9.9 FL (ref 9.4–12.3)
POTASSIUM SERPL-SCNC: 3.3 MMOL/L (ref 3.5–5)
RBC # BLD: 2.69 M/UL (ref 4.2–5.4)
SARS-COV-2, NAAT: NOT DETECTED
SODIUM BLD-SCNC: 130 MMOL/L (ref 136–145)
TOTAL PROTEIN: 7.1 G/DL (ref 6.6–8.7)
WBC # BLD: 6.6 K/UL (ref 4.8–10.8)

## 2022-01-22 PROCEDURE — 2580000003 HC RX 258: Performed by: NURSE PRACTITIONER

## 2022-01-22 PROCEDURE — 71045 X-RAY EXAM CHEST 1 VIEW: CPT

## 2022-01-22 PROCEDURE — 83036 HEMOGLOBIN GLYCOSYLATED A1C: CPT

## 2022-01-22 PROCEDURE — 8010000000 HC HEMODIALYSIS ACUTE INPT

## 2022-01-22 PROCEDURE — 1210000000 HC MED SURG R&B

## 2022-01-22 PROCEDURE — 99284 EMERGENCY DEPT VISIT MOD MDM: CPT

## 2022-01-22 PROCEDURE — 2500000003 HC RX 250 WO HCPCS: Performed by: EMERGENCY MEDICINE

## 2022-01-22 PROCEDURE — 6370000000 HC RX 637 (ALT 250 FOR IP): Performed by: INTERNAL MEDICINE

## 2022-01-22 PROCEDURE — 6360000002 HC RX W HCPCS: Performed by: NURSE PRACTITIONER

## 2022-01-22 PROCEDURE — 73552 X-RAY EXAM OF FEMUR 2/>: CPT

## 2022-01-22 PROCEDURE — 82947 ASSAY GLUCOSE BLOOD QUANT: CPT

## 2022-01-22 PROCEDURE — 6370000000 HC RX 637 (ALT 250 FOR IP): Performed by: NURSE PRACTITIONER

## 2022-01-22 PROCEDURE — 72170 X-RAY EXAM OF PELVIS: CPT

## 2022-01-22 PROCEDURE — 85025 COMPLETE CBC W/AUTO DIFF WBC: CPT

## 2022-01-22 PROCEDURE — 87040 BLOOD CULTURE FOR BACTERIA: CPT

## 2022-01-22 PROCEDURE — 6370000000 HC RX 637 (ALT 250 FOR IP): Performed by: EMERGENCY MEDICINE

## 2022-01-22 PROCEDURE — 94640 AIRWAY INHALATION TREATMENT: CPT

## 2022-01-22 PROCEDURE — 87635 SARS-COV-2 COVID-19 AMP PRB: CPT

## 2022-01-22 PROCEDURE — 93005 ELECTROCARDIOGRAM TRACING: CPT | Performed by: EMERGENCY MEDICINE

## 2022-01-22 PROCEDURE — A4216 STERILE WATER/SALINE, 10 ML: HCPCS | Performed by: NURSE PRACTITIONER

## 2022-01-22 PROCEDURE — 73560 X-RAY EXAM OF KNEE 1 OR 2: CPT

## 2022-01-22 PROCEDURE — 5A1D70Z PERFORMANCE OF URINARY FILTRATION, INTERMITTENT, LESS THAN 6 HOURS PER DAY: ICD-10-PCS | Performed by: INTERNAL MEDICINE

## 2022-01-22 PROCEDURE — 80053 COMPREHEN METABOLIC PANEL: CPT

## 2022-01-22 PROCEDURE — 96375 TX/PRO/DX INJ NEW DRUG ADDON: CPT

## 2022-01-22 PROCEDURE — 6370000000 HC RX 637 (ALT 250 FOR IP)

## 2022-01-22 PROCEDURE — 73620 X-RAY EXAM OF FOOT: CPT

## 2022-01-22 PROCEDURE — 6360000002 HC RX W HCPCS: Performed by: EMERGENCY MEDICINE

## 2022-01-22 PROCEDURE — 96374 THER/PROPH/DIAG INJ IV PUSH: CPT

## 2022-01-22 PROCEDURE — 36415 COLL VENOUS BLD VENIPUNCTURE: CPT

## 2022-01-22 RX ORDER — CLONIDINE HYDROCHLORIDE 0.1 MG/1
0.2 TABLET ORAL
Status: COMPLETED | OUTPATIENT
Start: 2022-01-22 | End: 2022-01-22

## 2022-01-22 RX ORDER — LISINOPRIL 10 MG/1
20 TABLET ORAL 2 TIMES DAILY
Status: DISCONTINUED | OUTPATIENT
Start: 2022-01-22 | End: 2022-01-27 | Stop reason: HOSPADM

## 2022-01-22 RX ORDER — ARFORMOTEROL TARTRATE 15 UG/2ML
15 SOLUTION RESPIRATORY (INHALATION) 2 TIMES DAILY
Status: DISCONTINUED | OUTPATIENT
Start: 2022-01-22 | End: 2022-01-23

## 2022-01-22 RX ORDER — GUAIFENESIN 600 MG/1
600 TABLET, EXTENDED RELEASE ORAL 2 TIMES DAILY
Status: DISCONTINUED | OUTPATIENT
Start: 2022-01-22 | End: 2022-01-27 | Stop reason: HOSPADM

## 2022-01-22 RX ORDER — SODIUM CHLORIDE 0.9 % (FLUSH) 0.9 %
5-40 SYRINGE (ML) INJECTION EVERY 12 HOURS SCHEDULED
Status: DISCONTINUED | OUTPATIENT
Start: 2022-01-22 | End: 2022-01-27 | Stop reason: HOSPADM

## 2022-01-22 RX ORDER — ROPINIROLE 4 MG/1
4 TABLET, FILM COATED ORAL NIGHTLY
Status: DISCONTINUED | OUTPATIENT
Start: 2022-01-22 | End: 2022-01-27 | Stop reason: HOSPADM

## 2022-01-22 RX ORDER — DEXTROSE MONOHYDRATE 50 MG/ML
100 INJECTION, SOLUTION INTRAVENOUS PRN
Status: DISCONTINUED | OUTPATIENT
Start: 2022-01-22 | End: 2022-01-27 | Stop reason: HOSPADM

## 2022-01-22 RX ORDER — ATORVASTATIN CALCIUM 40 MG/1
40 TABLET, FILM COATED ORAL DAILY
Status: DISCONTINUED | OUTPATIENT
Start: 2022-01-22 | End: 2022-01-27 | Stop reason: HOSPADM

## 2022-01-22 RX ORDER — SODIUM CHLORIDE 0.9 % (FLUSH) 0.9 %
5-40 SYRINGE (ML) INJECTION PRN
Status: DISCONTINUED | OUTPATIENT
Start: 2022-01-22 | End: 2022-01-27 | Stop reason: HOSPADM

## 2022-01-22 RX ORDER — NICOTINE POLACRILEX 4 MG
15 LOZENGE BUCCAL PRN
Status: DISCONTINUED | OUTPATIENT
Start: 2022-01-22 | End: 2022-01-27 | Stop reason: HOSPADM

## 2022-01-22 RX ORDER — LEVOTHYROXINE SODIUM 0.07 MG/1
150 TABLET ORAL DAILY
Status: DISCONTINUED | OUTPATIENT
Start: 2022-01-22 | End: 2022-01-27 | Stop reason: HOSPADM

## 2022-01-22 RX ORDER — NIFEDIPINE 30 MG/1
90 TABLET, EXTENDED RELEASE ORAL DAILY
Status: DISCONTINUED | OUTPATIENT
Start: 2022-01-22 | End: 2022-01-22 | Stop reason: SDUPTHER

## 2022-01-22 RX ORDER — INSULIN GLARGINE 100 [IU]/ML
0.15 INJECTION, SOLUTION SUBCUTANEOUS NIGHTLY
Status: DISCONTINUED | OUTPATIENT
Start: 2022-01-22 | End: 2022-01-27 | Stop reason: HOSPADM

## 2022-01-22 RX ORDER — ALBUTEROL SULFATE 90 UG/1
2 AEROSOL, METERED RESPIRATORY (INHALATION) EVERY 4 HOURS PRN
Status: DISCONTINUED | OUTPATIENT
Start: 2022-01-22 | End: 2022-01-22

## 2022-01-22 RX ORDER — DOXYCYCLINE HYCLATE 100 MG/1
100 CAPSULE ORAL EVERY 12 HOURS SCHEDULED
Status: DISCONTINUED | OUTPATIENT
Start: 2022-01-22 | End: 2022-01-23

## 2022-01-22 RX ORDER — ISOSORBIDE MONONITRATE 60 MG/1
120 TABLET, EXTENDED RELEASE ORAL DAILY
Status: DISCONTINUED | OUTPATIENT
Start: 2022-01-22 | End: 2022-01-27 | Stop reason: HOSPADM

## 2022-01-22 RX ORDER — DEXTROSE MONOHYDRATE 25 G/50ML
12.5 INJECTION, SOLUTION INTRAVENOUS PRN
Status: DISCONTINUED | OUTPATIENT
Start: 2022-01-22 | End: 2022-01-27 | Stop reason: CLARIF

## 2022-01-22 RX ORDER — ALBUTEROL SULFATE 2.5 MG/3ML
2.5 SOLUTION RESPIRATORY (INHALATION) EVERY 4 HOURS PRN
Status: DISCONTINUED | OUTPATIENT
Start: 2022-01-22 | End: 2022-01-27 | Stop reason: HOSPADM

## 2022-01-22 RX ORDER — NICOTINE 21 MG/24HR
1 PATCH, TRANSDERMAL 24 HOURS TRANSDERMAL DAILY
Status: DISCONTINUED | OUTPATIENT
Start: 2022-01-22 | End: 2022-01-27 | Stop reason: HOSPADM

## 2022-01-22 RX ORDER — BUDESONIDE 0.5 MG/2ML
0.5 INHALANT ORAL 2 TIMES DAILY
Status: DISCONTINUED | OUTPATIENT
Start: 2022-01-22 | End: 2022-01-23

## 2022-01-22 RX ORDER — DULOXETIN HYDROCHLORIDE 30 MG/1
60 CAPSULE, DELAYED RELEASE ORAL DAILY
Status: DISCONTINUED | OUTPATIENT
Start: 2022-01-22 | End: 2022-01-27 | Stop reason: HOSPADM

## 2022-01-22 RX ORDER — OMEGA-3S/DHA/EPA/FISH OIL/D3 300MG-1000
400 CAPSULE ORAL
Status: DISCONTINUED | OUTPATIENT
Start: 2022-01-22 | End: 2022-01-27 | Stop reason: HOSPADM

## 2022-01-22 RX ORDER — SEVELAMER CARBONATE 800 MG/1
800 TABLET, FILM COATED ORAL
Status: DISCONTINUED | OUTPATIENT
Start: 2022-01-22 | End: 2022-01-27 | Stop reason: HOSPADM

## 2022-01-22 RX ORDER — POLYETHYLENE GLYCOL 3350 17 G/17G
17 POWDER, FOR SOLUTION ORAL DAILY PRN
Status: DISCONTINUED | OUTPATIENT
Start: 2022-01-22 | End: 2022-01-27 | Stop reason: HOSPADM

## 2022-01-22 RX ORDER — HYDROMORPHONE HYDROCHLORIDE 1 MG/ML
1 INJECTION, SOLUTION INTRAMUSCULAR; INTRAVENOUS; SUBCUTANEOUS ONCE
Status: COMPLETED | OUTPATIENT
Start: 2022-01-22 | End: 2022-01-22

## 2022-01-22 RX ORDER — HEPARIN SODIUM 5000 [USP'U]/ML
5000 INJECTION, SOLUTION INTRAVENOUS; SUBCUTANEOUS EVERY 8 HOURS SCHEDULED
Status: DISCONTINUED | OUTPATIENT
Start: 2022-01-22 | End: 2022-01-27 | Stop reason: HOSPADM

## 2022-01-22 RX ORDER — CLONIDINE 0.1 MG/24H
1 PATCH, EXTENDED RELEASE TRANSDERMAL WEEKLY
Status: DISCONTINUED | OUTPATIENT
Start: 2022-01-22 | End: 2022-01-27 | Stop reason: HOSPADM

## 2022-01-22 RX ORDER — ACETAMINOPHEN 325 MG/1
650 TABLET ORAL EVERY 6 HOURS PRN
Status: DISCONTINUED | OUTPATIENT
Start: 2022-01-22 | End: 2022-01-27 | Stop reason: HOSPADM

## 2022-01-22 RX ORDER — MAGNESIUM HYDROXIDE/ALUMINUM HYDROXICE/SIMETHICONE 120; 1200; 1200 MG/30ML; MG/30ML; MG/30ML
30 SUSPENSION ORAL EVERY 6 HOURS PRN
Status: DISCONTINUED | OUTPATIENT
Start: 2022-01-22 | End: 2022-01-27 | Stop reason: HOSPADM

## 2022-01-22 RX ORDER — HYDRALAZINE HYDROCHLORIDE 25 MG/1
100 TABLET, FILM COATED ORAL EVERY 8 HOURS SCHEDULED
Status: DISCONTINUED | OUTPATIENT
Start: 2022-01-22 | End: 2022-01-27 | Stop reason: HOSPADM

## 2022-01-22 RX ORDER — NIFEDIPINE 90 MG/1
90 TABLET, EXTENDED RELEASE ORAL DAILY
Status: DISCONTINUED | OUTPATIENT
Start: 2022-01-22 | End: 2022-01-27 | Stop reason: HOSPADM

## 2022-01-22 RX ORDER — METOPROLOL SUCCINATE 50 MG/1
150 TABLET, EXTENDED RELEASE ORAL
Status: DISCONTINUED | OUTPATIENT
Start: 2022-01-23 | End: 2022-01-27 | Stop reason: HOSPADM

## 2022-01-22 RX ORDER — HYDROCODONE BITARTRATE AND ACETAMINOPHEN 5; 325 MG/1; MG/1
1 TABLET ORAL EVERY 4 HOURS PRN
Status: DISCONTINUED | OUTPATIENT
Start: 2022-01-22 | End: 2022-01-27 | Stop reason: HOSPADM

## 2022-01-22 RX ORDER — LACTULOSE 10 G/15ML
15 SOLUTION ORAL 3 TIMES DAILY
Status: DISCONTINUED | OUTPATIENT
Start: 2022-01-22 | End: 2022-01-27 | Stop reason: HOSPADM

## 2022-01-22 RX ORDER — ACETAMINOPHEN 650 MG/1
650 SUPPOSITORY RECTAL EVERY 6 HOURS PRN
Status: DISCONTINUED | OUTPATIENT
Start: 2022-01-22 | End: 2022-01-27 | Stop reason: HOSPADM

## 2022-01-22 RX ORDER — SODIUM CHLORIDE 9 MG/ML
25 INJECTION, SOLUTION INTRAVENOUS PRN
Status: DISCONTINUED | OUTPATIENT
Start: 2022-01-22 | End: 2022-01-27 | Stop reason: HOSPADM

## 2022-01-22 RX ORDER — SENNA PLUS 8.6 MG/1
1 TABLET ORAL DAILY
Status: DISCONTINUED | OUTPATIENT
Start: 2022-01-22 | End: 2022-01-27 | Stop reason: HOSPADM

## 2022-01-22 RX ORDER — LABETALOL HYDROCHLORIDE 5 MG/ML
10 INJECTION, SOLUTION INTRAVENOUS ONCE
Status: COMPLETED | OUTPATIENT
Start: 2022-01-22 | End: 2022-01-22

## 2022-01-22 RX ORDER — ONDANSETRON 4 MG/1
4 TABLET, ORALLY DISINTEGRATING ORAL EVERY 8 HOURS PRN
Status: DISCONTINUED | OUTPATIENT
Start: 2022-01-22 | End: 2022-01-27 | Stop reason: HOSPADM

## 2022-01-22 RX ORDER — ONDANSETRON 2 MG/ML
4 INJECTION INTRAMUSCULAR; INTRAVENOUS EVERY 6 HOURS PRN
Status: DISCONTINUED | OUTPATIENT
Start: 2022-01-22 | End: 2022-01-27 | Stop reason: HOSPADM

## 2022-01-22 RX ORDER — NICOTINE 21 MG/24HR
PATCH, TRANSDERMAL 24 HOURS TRANSDERMAL
Status: COMPLETED
Start: 2022-01-22 | End: 2022-01-22

## 2022-01-22 RX ADMIN — SODIUM CHLORIDE, PRESERVATIVE FREE 10 ML: 5 INJECTION INTRAVENOUS at 22:09

## 2022-01-22 RX ADMIN — GUAIFENESIN 600 MG: 600 TABLET, EXTENDED RELEASE ORAL at 22:10

## 2022-01-22 RX ADMIN — NIFEDIPINE 90 MG: 90 TABLET, FILM COATED, EXTENDED RELEASE ORAL at 19:38

## 2022-01-22 RX ADMIN — CHOLECALCIFEROL TAB 10 MCG (400 UNIT) 400 UNITS: 10 TAB at 16:46

## 2022-01-22 RX ADMIN — INSULIN LISPRO 4 UNITS: 100 INJECTION, SOLUTION INTRAVENOUS; SUBCUTANEOUS at 18:41

## 2022-01-22 RX ADMIN — ROPINIROLE HYDROCHLORIDE 4 MG: 4 TABLET, FILM COATED ORAL at 22:09

## 2022-01-22 RX ADMIN — INSULIN HUMAN 10 UNITS: 100 INJECTION, SOLUTION PARENTERAL at 08:30

## 2022-01-22 RX ADMIN — HYDROCODONE BITARTRATE AND ACETAMINOPHEN 1 TABLET: 5; 325 TABLET ORAL at 22:09

## 2022-01-22 RX ADMIN — LEVOTHYROXINE SODIUM 150 MCG: 0.07 TABLET ORAL at 17:04

## 2022-01-22 RX ADMIN — INSULIN LISPRO 6 UNITS: 100 INJECTION, SOLUTION INTRAVENOUS; SUBCUTANEOUS at 17:50

## 2022-01-22 RX ADMIN — SENNOSIDES 8.6 MG: 8.6 TABLET, COATED ORAL at 17:04

## 2022-01-22 RX ADMIN — ARFORMOTEROL TARTRATE 15 MCG: 15 SOLUTION RESPIRATORY (INHALATION) at 19:04

## 2022-01-22 RX ADMIN — LACTULOSE 15.33 G: 20 SOLUTION ORAL at 22:10

## 2022-01-22 RX ADMIN — LACTULOSE 15.33 G: 20 SOLUTION ORAL at 16:37

## 2022-01-22 RX ADMIN — HYDRALAZINE HYDROCHLORIDE 100 MG: 25 TABLET, FILM COATED ORAL at 17:03

## 2022-01-22 RX ADMIN — HEPARIN SODIUM 5000 UNITS: 5000 INJECTION INTRAVENOUS; SUBCUTANEOUS at 17:04

## 2022-01-22 RX ADMIN — GUAIFENESIN 600 MG: 600 TABLET, EXTENDED RELEASE ORAL at 16:37

## 2022-01-22 RX ADMIN — CLONIDINE HYDROCHLORIDE 0.2 MG: 0.1 TABLET ORAL at 15:10

## 2022-01-22 RX ADMIN — HYDROMORPHONE HYDROCHLORIDE 1 MG: 1 INJECTION, SOLUTION INTRAMUSCULAR; INTRAVENOUS; SUBCUTANEOUS at 07:28

## 2022-01-22 RX ADMIN — Medication 10 MG: at 08:32

## 2022-01-22 RX ADMIN — SEVELAMER CARBONATE 800 MG: 800 TABLET, FILM COATED ORAL at 16:38

## 2022-01-22 RX ADMIN — INSULIN GLARGINE 11 UNITS: 100 INJECTION, SOLUTION SUBCUTANEOUS at 22:12

## 2022-01-22 RX ADMIN — IPRATROPIUM BROMIDE 0.5 MG: 0.5 SOLUTION RESPIRATORY (INHALATION) at 19:04

## 2022-01-22 RX ADMIN — LISINOPRIL 20 MG: 10 TABLET ORAL at 16:37

## 2022-01-22 RX ADMIN — IPRATROPIUM BROMIDE 0.5 MG: 0.5 SOLUTION RESPIRATORY (INHALATION) at 14:32

## 2022-01-22 RX ADMIN — CEFTRIAXONE 1000 MG: 1 INJECTION, POWDER, FOR SOLUTION INTRAMUSCULAR; INTRAVENOUS at 16:34

## 2022-01-22 RX ADMIN — ATORVASTATIN CALCIUM 40 MG: 40 TABLET, FILM COATED ORAL at 16:38

## 2022-01-22 RX ADMIN — HYDROCODONE BITARTRATE AND ACETAMINOPHEN 1 TABLET: 5; 325 TABLET ORAL at 13:59

## 2022-01-22 RX ADMIN — LISINOPRIL 20 MG: 10 TABLET ORAL at 22:09

## 2022-01-22 RX ADMIN — DOXYCYCLINE HYCLATE 100 MG: 100 CAPSULE ORAL at 22:23

## 2022-01-22 RX ADMIN — SODIUM CHLORIDE, PRESERVATIVE FREE 10 ML: 5 INJECTION INTRAVENOUS at 18:44

## 2022-01-22 RX ADMIN — ISOSORBIDE MONONITRATE 120 MG: 60 TABLET, EXTENDED RELEASE ORAL at 16:38

## 2022-01-22 RX ADMIN — BUDESONIDE 500 MCG: 0.5 SUSPENSION RESPIRATORY (INHALATION) at 19:04

## 2022-01-22 RX ADMIN — DULOXETINE 60 MG: 30 CAPSULE, DELAYED RELEASE ORAL at 16:38

## 2022-01-22 ASSESSMENT — ENCOUNTER SYMPTOMS
SHORTNESS OF BREATH: 0
VOMITING: 0
ABDOMINAL PAIN: 0
PHOTOPHOBIA: 0
COUGH: 0
COLOR CHANGE: 0
NAUSEA: 0
CHEST TIGHTNESS: 0
BACK PAIN: 0
DIARRHEA: 0
RHINORRHEA: 0
SORE THROAT: 0

## 2022-01-22 ASSESSMENT — PAIN DESCRIPTION - LOCATION: LOCATION: HIP;LEG;KNEE

## 2022-01-22 ASSESSMENT — PAIN SCALES - GENERAL
PAINLEVEL_OUTOF10: 10
PAINLEVEL_OUTOF10: 10
PAINLEVEL_OUTOF10: 8
PAINLEVEL_OUTOF10: 6
PAINLEVEL_OUTOF10: 10

## 2022-01-22 ASSESSMENT — PAIN DESCRIPTION - DESCRIPTORS: DESCRIPTORS: SHARP;CONSTANT;SHOOTING

## 2022-01-22 NOTE — ED NOTES
Attempted to give report to 5th floor, they were unable to take my report at this time     Britton Daily RN  01/22/22 8078

## 2022-01-22 NOTE — H&P
[facility]      Hospitalist - History & Physical      PCP: ROMINA Cunningham    Date of Admission: 1/22/2022    Date of Service: 1/22/2022    Chief Complaint:  Fall, left leg pain    History Of Present Illness: The patient is a 47 y.o. female with history as outlined below who presented to [address] [place] [address] ED complaining of left leg pain after fall. The patient states she was getting ready for dialysis this morning and fell landing on her left leg. She reported hearing \"a pop\" in her left knee when she fell. She denies hitting her head or LOC. ED work-up indicated a left acute distal femur fracture on x-ray, a right infrahilar opacity and fluid overload on chest x-ray, glucose greater than 344, and systolic pressure over 646. BP was treated in ED and is down to 180s, glucose treated as well. Patient admitted to hospitialist service for femur fracture, hyperglycemia, and hypertensive urgency.     Past Medical History:        Diagnosis Date    Arthritis     Chronic kidney disease, stage 5, kidney failure (HCC)     Closed fracture of right shoulder girdle, with routine healing, subsequent encounter     DM (diabetes mellitus) type II controlled with renal manifestation ([facility] [address] 06/1993    Gastroparesis     GERD (gastroesophageal reflux disease)     Hemodialysis patient ([facility] [address]     dialysis on tues, thur, and sat at [facility]    Hypertension     Hypothyroid     Nasal congestion     recent    Neuropathy     Noncompliance with medications     Palliative care patient 10/08/2019    Restless leg syndrome        Past Surgical History:        Procedure Laterality Date    BREAST SURGERY Left 2008    infected milk gland removed    BREAST SURGERY Right 5/25/2021    WEDGE EXCISION RIGHT BREAST DUCT WITH LACRIMAL DUCT PROBE, PEC BLOCK performed by Gill Ansari MD at [address] [place] [address], LAPAROSCOPIC      2008    COLONOSCOPY Left 9/17/2020    Dr Lonny South hepatic flexure-Severe tortuosity with severe spasming, 1 yr recall    DIALYSIS FISTULA CREATION Left 1/25/2019    REVISION LEFT UPPER EXTREMITY AV ACCESS WITH LEFT BRACHIAL ARTERY TO LEFT AXILLARY VEIN WITH  INTERPOSITIONAL ARTEGRAFT   performed by Pallavi Briscoe MD at [address]  2012    [address] Right 1/25/2019    INSERTION OF RIGHT INTERNAL JUGULAR HEMODIALYSIS CATHETER performed by Pallavi Briscoe MD at [address]  08/17/2018    1.  Moderately increased stainable iron identified by special stain in Kupffer cells and occasional hepatocytes. Negative for evidence of significant portal or lobular inflammation. Negative for evidence of significant fibrosis    MN COLONOSCOPY W/BIOPSY SINGLE/MULTIPLE N/A 10/20/2017    Dr Rod Srpinger prep-Tubular AP (-) dysplasia x 2--3 yr recall    MN EGD TRANSORAL BIOPSY SINGLE/MULTIPLE N/A 12/27/2016    Dr Mounika Esparza EGD TRANSORAL BIOPSY SINGLE/MULTIPLE N/A 10/20/2017    Dr Diane Mendez (-)   [address] [facility] VASCULAR SURGERY Left 2016    fistula by Dr Loly White at [address]  10/02/2017    [facility]. Left upper fistulograms/venograms, balloon angioplasty cephalic vein arch [id number] conquest.    VASCULAR SURGERY  08/2018    FISTULOGRAM    VASCULAR SURGERY  10/29/2018    [facility]. Left upper fistulograms/venograms    VASCULAR SURGERY  12/19/2018    [facility]. Arch aortogram,left upper arteriograms.  VASCULAR SURGERY  03/06/2019    [facility]. Removal of tunneled dialyis catheter right internal jugular vein.  VASCULAR SURGERY  08/28/2019    [facility]. Left upper extremity fistulogram including venography to the superior vena cava. Balloon angioplasty left subclavian vein with 10mm x 40mm conquest balloon. Balloon angioplasty mid/proximal upper arm cephalic vein with 37BV x 40mm conquest balloon. Venograms after balloon angioplasty. Stent left mid/proximal upper arm cephalic vein stenosis fluency 10mm x 60mm self-expanding covered stent. Balloon     VASCULAR SURGERY 08/28/2019    cont angioplasty stent with 10mm x 40mm conquest balloon. Completion venograms left upper extremity. Home Medications:  Prior to Admission medications    Medication Sig Start Date End Date Taking? Authorizing Provider   insulin glargine (BASAGLAR KWIKPEN) 100 UNIT/ML injection pen Inject 5 Units into the skin nightly Indications: Diabetes 1/12/22   Nicloe Chavez MD   insulin aspart (NOVOLOG FLEXPEN) 100 UNIT/ML injection pen Inject 4 Units into the skin 3 times daily (before meals) Indications: Diabetes 1/12/22   Nicole Chavez MD   lactulose (CHRONULAC) 10 GM/15ML solution Take 15 g by mouth 3 times daily With meals for constipation, hold for loose stools    Historical Provider, MD   cloNIDine (CATAPRES) 0.1 MG/24HR PTWK Place 1 patch onto the skin once a week 12/28/21   Dennie Ram, APRN - CNP   nicotine (NICODERM CQ) 14 MG/24HR Place 1 patch onto the skin daily 12/23/21   Dennie Ram, APRN - CNP   HYDROcodone-acetaminophen (NORCO) 5-325 MG per tablet Take 1 tablet by mouth every 6 hours as needed for Pain.  12/22/21 12/22/22  Kevin Salas MD   isosorbide mononitrate (IMDUR) 120 MG extended release tablet Take 1 tablet by mouth daily 11/26/21   Dennie Ram, APRN - CNP   hydrALAZINE (APRESOLINE) 100 MG tablet Take 1 tablet by mouth every 8 hours 11/25/21   Dennie Ram, APRN - CNP   metoprolol succinate (TOPROL XL) 100 MG extended release tablet Take 1 tablet by mouth every morning (before breakfast)  Patient taking differently: Take 150 mg by mouth every morning (before breakfast)  11/26/21   Dennie Ram, APRN - CNP   NIFEdipine (PROCARDIA XL) 30 MG extended release tablet Take 3 tablets by mouth daily 11/26/21   Dennie Ram, APRN - CNP   Ergocalciferol (VITAMIN D2) 10 MCG (400 UNIT) TABS Take 1.25 mg by mouth every 14 days    Historical Provider, MD   senna (SENNA-LAX) 8.6 MG tablet Take 1 tablet by mouth daily    Historical Provider, MD sevelamer (RENVELA) 800 MG tablet Take 1 tablet by mouth 3 times daily (with meals)    Historical Provider, MD   lisinopril (PRINIVIL;ZESTRIL) 20 MG tablet Take 1 tablet by mouth 2 times daily 19   Shona Padilla, DO   albuterol sulfate  (90 Base) MCG/ACT inhaler Inhale 2 puffs into the lungs every 4 hours as needed    Historical Provider, MD   levothyroxine (SYNTHROID) 150 MCG tablet Take 150 mcg by mouth Daily    Historical Provider, MD   DULoxetine (CYMBALTA) 60 MG extended release capsule Take 60 mg by mouth daily    Historical Provider, MD   rOPINIRole (REQUIP) 2 MG tablet Take 4 mg by mouth nightly Indications: Restless Leg Syndrome     Historical Provider, MD   simvastatin (ZOCOR) 40 MG tablet Take 40 mg by mouth nightly     Historical Provider, MD       Allergies:    Penicillins, Lamisil advanced [terbinafine], Reglan [metoclopramide], and Stadol [butorphanol]    Social History:    The patient currently lives home with her boyfriend, recently discharge from [facility]  Tobacco:   reports that she has been smoking cigarettes. She has a 16.50 pack-year smoking history. She has never used smokeless tobacco.  Alcohol:   reports no history of alcohol use. Illicit Drugs: denies    Family History:      Problem Relation Age of Onset    Colon Cancer Mother          at 63    Colon Polyps Mother     Lung Cancer Father          at 79    Colon Polyps Father     Stomach Cancer Sister     Breast Cancer Sister 27    Depression Daughter 25        committed suicide    Liver Cancer Neg Hx     Liver Disease Neg Hx     Esophageal Cancer Neg Hx     Rectal Cancer Neg Hx        Review of Systems:   Review of Systems   Constitutional: Negative for chills, fatigue and fever. HENT: Positive for mouth sores (reports \"bad teeth\"). Negative for sore throat. Eyes: Negative for photophobia and visual disturbance. Respiratory: Negative for cough, chest tightness and shortness of breath.     Cardiovascular: Negative for chest pain, palpitations and leg swelling. Gastrointestinal: Negative for abdominal pain, diarrhea, nausea and vomiting. Genitourinary: Negative for difficulty urinating, dysuria, frequency and urgency. Musculoskeletal: Positive for arthralgias. Skin: Negative for color change and wound. Neurological: Negative for dizziness, syncope, weakness, numbness and headaches. Psychiatric/Behavioral: Negative for agitation and confusion. 14 point review of systems is negative except as specifically addressed above. Physical Examination:  BP (!) 217/83   Pulse 74   Temp 98.2 °F (36.8 °C) (Oral)   Resp 20   Ht 5' 6\" (1.676 m)   Wt 155 lb 6.8 oz (70.5 kg)   SpO2 99%   BMI 25.09 kg/m²   Physical Exam  Vitals and nursing note reviewed. Constitutional:       Appearance: She is not ill-appearing. HENT:      Head: Normocephalic and atraumatic. Mouth/Throat:      Mouth: Mucous membranes are moist.      Comments: Multiple broken and missing teeth  Eyes:      Extraocular Movements: Extraocular movements intact. Pupils: Pupils are equal, round, and reactive to light. Cardiovascular:      Rate and Rhythm: Normal rate and regular rhythm. Pulses: Normal pulses. Heart sounds: Normal heart sounds. Pulmonary:      Effort: Pulmonary effort is normal.      Breath sounds: Normal breath sounds. Abdominal:      General: Abdomen is flat. Bowel sounds are normal. There is no distension. Palpations: Abdomen is soft. Tenderness: There is no abdominal tenderness. There is no guarding. Musculoskeletal:         General: Tenderness and signs of injury present. Cervical back: Normal range of motion and neck supple. Right lower leg: No edema. Left lower leg: No edema. Comments: Knee immobilizer on E     Skin:     General: Skin is warm and dry. Capillary Refill: Capillary refill takes less than 2 seconds.    Neurological:      General: No focal the posterior cortex, involving the distal diaphysis, is consistent with an acute nondisplaced fracture. There is no dislocation. Impression: Acute nondisplaced fracture through the distal femoral diaphysis. Signed by Dr Francesco Lora    XR KNEE LEFT (1-2 VIEWS)    Result Date: 1/22/2022  Exam:   XR FEMUR LEFT (MIN 2 VIEWS), XR KNEE LEFT (1-2 VIEWS)  Date:  1/22/2022 History:  Female, age  47 years; fall COMPARISON:  None. Findings : Demineralized skeleton limits bony details. Linear lucency in the distal femur extending through the posterior cortex, involving the distal diaphysis, is consistent with an acute nondisplaced fracture. There is no dislocation. Impression: Acute nondisplaced fracture through the distal femoral diaphysis. Signed by Dr Francesco Lora    XR FOOT LEFT (2 VIEWS)    Result Date: 1/22/2022  Exam:   XR FOOT LEFT (2 VIEWS)  Date:  1/22/2022 History:  Female, age  47 years; fall COMPARISON:  None. Findings : There is no acute displaced fracture. No dislocation. No suspicious lytic or blastic lesion. No radiopaque foreign body. Vascular calcifications. Impression: No acute osseous pathology. Signed by Dr Francesco Lora    XR CHEST PORTABLE    Result Date: 1/22/2022  Exam:   XR CHEST PORTABLE  Date:  1/22/2022 History:  Female, age  47 years; preop COMPARISON:  Chest x-ray dated January 10, 2022 Findings : Mild cardiomegaly. Central pulmonary venous congestion and coarsened interstitium. Right infrahilar opacity. The bones show no acute pathology. Vascular stent projecting over the left axilla and thoracic inlet. Impression: Cardiomegaly and interstitial coarsening, concerning for fluid overload. Right infrahilar opacity, may reflect atelectasis, aspiration or infection.  Signed by Dr Francesco Lora      Assessment/Plan:  Principal Problem:    Closed fracture of left distal femur ([address]   -admit   -bedrest   -ortho consulted from ED   -knee immobilizer per ortho request   -pain control   -PT/OT when ok with ortho   -case management to assist in DC planning    Active Problems:    Anemia in chronic kidney disease (CKD)   -noted   -monitor CBC      Noncompliance of patient with renal dialysis ([facility] [address]   -noted      Uncontrolled type 2 diabetes mellitus with complication, with long-term current use of insulin (HCC)/  Hyperglycemia due to type 2 diabetes mellitus (HCC)   -A1c   -IV insulin given in ED   -DM and compliance education   -Lantus, Prandial, and SSI   -accu checks   -hypoglycemia protocol in place      ESRD (end stage renal disease) on dialysis [facility])   -nephrology consulted   -patient due to dialysis today      Uncontrolled hypertension   -compliance education   -resume home medications   -Q4 VS      Medically noncompliant   -noted       Signed:  ROMINA Buchanan - CNP, 1/22/2022 9:30 AM

## 2022-01-22 NOTE — LETTER
Stephen Marsh,  at [facility]  [phone] phone  [phone] fax  [phone] CELL ([address])        Stephen Marsh  at [facility]  [phone] phone  [phone] fax  [phone] CELL ([address])

## 2022-01-22 NOTE — CONSULTS
Orthopaedic Surgery  Inpatient Consultation    Nicolas Jorge Alberto (1970)  1/22/2022    Requesting Physician: DR Vale Lagos  Consulting Physician: DR Ankur Joseph  1/22/2022  5:58 AM    CHIEF COMPLAINT:  left FEMORAL SHAFT FX S P FALL    History Obtained From:  Patient/CHART/STAFF    HISTORY OF PRESENT ILLNESS:                The patient is a 47 y.o. female who presents with above chief complaint. PT IS END STAGE KIDNEY DISEASE ON DIALYSIS, FELL OUT OF WHEELCHAIR THIS AM RESULTING IN A DISTAL NONDISPLACED FEMORAL SHAFT FX.      Past Medical History:        Diagnosis Date    Arthritis     Chronic kidney disease, stage 5, kidney failure (HCC)     Closed fracture of right shoulder girdle, with routine healing, subsequent encounter     DM (diabetes mellitus) type II controlled with renal manifestation ([facility] [address] 06/1993    Gastroparesis     GERD (gastroesophageal reflux disease)     Hemodialysis patient ([facility] [address]     dialysis on tues, thur, and sat at [facility] clinic    Hypertension     Hypothyroid     Nasal congestion     recent    Neuropathy     Noncompliance with medications     Palliative care patient 10/08/2019    Restless leg syndrome        Past Surgical History:        Procedure Laterality Date    BREAST SURGERY Left 2008    infected milk gland removed    BREAST SURGERY Right 5/25/2021    WEDGE EXCISION RIGHT BREAST DUCT WITH LACRIMAL DUCT PROBE, PEC BLOCK performed by Henry Reese MD at [address] [facility], LAPAR[facility]COP[facility]      2008    COLONOSCOPY Left 9/17/2020    Dr Carla Ramirez hepatic flexure-Severe tortuosity with severe spasming, 1 yr recall    DIALYSIS FISTULA CREATION Left 1/25/2019    REVISION LEFT UPPER EXTREMITY AV ACCESS WITH LEFT BRACHIAL ARTERY TO LEFT AXILLARY VEIN WITH  INTERPOSITIONAL ARTEGRAFT   performed by Stefania Silvestre MD at [address]  2012    [address] Right 1/25/2019    INSERTION OF RIGHT INTERNAL JUGULAR HEMODIALYSIS CATHETER performed by Giovanni Harris Bryan Frost MD at [address] LIVER BIOPSY  08/17/2018    1.  Moderately increased stainable iron identified by special stain in Kupffer cells and occasional hepatocytes. Negative for evidence of significant portal or lobular inflammation. Negative for evidence of significant fibrosis    VT COLONOSCOPY W/BIOPSY SINGLE/MULTIPLE N/A 10/20/2017    Dr Mae Maddox prep-Tubular AP (-) dysplasia x 2--3 yr recall    VT EGD TRANSORAL BIOPSY SINGLE/MULTIPLE N/A 12/27/2016    Dr Amezquita Section EGD TRANSORAL BIOPSY SINGLE/MULTIPLE N/A 10/20/2017    Dr Elba Major (-)   [address] [facility] VASCULAR SURGERY Left 2016    fistula by Dr Matthew Porras at [address]  10/02/2017    [facility]. Left upper fistulograms/venograms, balloon angioplasty cephalic vein arch 85S08 conquest.    VASCULAR SURGERY  08/2018    FISTULOGRAM    VASCULAR SURGERY  10/29/2018    [facility]. Left upper fistulograms/venograms    VASCULAR SURGERY  12/19/2018    [facility]. Arch aortogram,left upper arteriograms.  VASCULAR SURGERY  03/06/2019    [facility]. Removal of tunneled dialyis catheter right internal jugular vein.  VASCULAR SURGERY  08/28/2019    [facility]. Left upper extremity fistulogram including venography to the superior vena cava. Balloon angioplasty left subclavian vein with 10mm x 40mm conquest balloon. Balloon angioplasty mid/proximal upper arm cephalic vein with 35VM x 40mm conquest balloon. Venograms after balloon angioplasty. Stent left mid/proximal upper arm cephalic vein stenosis fluency 10mm x 60mm self-expanding covered stent. Balloon     VASCULAR SURGERY  08/28/2019    cont angioplasty stent with 10mm x 40mm conquest balloon. Completion venograms left upper extremity.        Current Medications:   Current Facility-Administered Medications: albuterol sulfate  (90 Base) MCG/ACT inhaler 2 puff, 2 puff, Inhalation, Q4H PRN  cloNIDine (CATAPRES) 0.1 MG/24HR 1 patch, 1 patch, TransDERmal, Weekly  DULoxetine (CYMBALTA) extended release capsule 60 mg, 60 mg, Oral, Daily  Vitamin D2 TABS 1.25 mg, 1.25 mg, Oral, Q14 Days  hydrALAZINE (APRESOLINE) tablet 100 mg, 100 mg, Oral, 3 times per day  HYDROcodone-acetaminophen (NORCO) 5-325 MG per tablet 1 tablet, 1 tablet, Oral, Q4H PRN  isosorbide mononitrate (IMDUR) extended release tablet 120 mg, 120 mg, Oral, Daily  lactulose (CHRONULAC) 10 GM/15ML solution 15.3333 g, 15.3333 g, Oral, TID  levothyroxine (SYNTHROID) tablet 150 mcg, 150 mcg, Oral, Daily  lisinopril (PRINIVIL;ZESTRIL) tablet 20 mg, 20 mg, Oral, BID  [START ON 1/23/2022] metoprolol succinate (TOPROL XL) extended release tablet 150 mg, 150 mg, Oral, QAM AC  nicotine (NICODERM CQ) 14 MG/24HR 1 patch, 1 patch, TransDERmal, Daily  NIFEdipine (PROCARDIA XL) extended release tablet 90 mg, 90 mg, Oral, Daily  rOPINIRole (REQUIP) tablet 4 mg, 4 mg, Oral, Nightly  senna (SENOKOT) tablet 8.6 mg, 1 tablet, Oral, Daily  sevelamer (RENVELA) tablet 800 mg, 800 mg, Oral, TID WC  atorvastatin (LIPITOR) tablet 40 mg, 40 mg, Oral, Daily  glucose (GLUTOSE) 40 % oral gel 15 g, 15 g, Oral, PRN  dextrose 50 % IV solution, 12.5 g, IntraVENous, PRN  glucagon (rDNA) injection 1 mg, 1 mg, IntraMUSCular, PRN  dextrose 5 % solution, 100 mL/hr, IntraVENous, PRN  sodium chloride flush 0.9 % injection 5-40 mL, 5-40 mL, IntraVENous, 2 times per day  sodium chloride flush 0.9 % injection 5-40 mL, 5-40 mL, IntraVENous, PRN  0.9 % sodium chloride infusion, 25 mL, IntraVENous, PRN  ondansetron (ZOFRAN-ODT) disintegrating tablet 4 mg, 4 mg, Oral, Q8H PRN **OR** ondansetron (ZOFRAN) injection 4 mg, 4 mg, IntraVENous, Q6H PRN  polyethylene glycol (GLYCOLAX) packet 17 g, 17 g, Oral, Daily PRN  acetaminophen (TYLENOL) tablet 650 mg, 650 mg, Oral, Q6H PRN **OR** acetaminophen (TYLENOL) suppository 650 mg, 650 mg, Rectal, Q6H PRN  aluminum & magnesium hydroxide-simethicone (MAALOX) 200-200-20 MG/5ML suspension 30 mL, 30 mL, Oral, Q6H PRN  heparin (porcine) injection 5,000 Units, 5,000 Units, SubCUTAneous, 3 times per day  ipratropium (ATROVENT) 0.02 % nebulizer solution 0.5 mg, 0.5 mg, Nebulization, Q4H WA  Arformoterol Tartrate (BROVANA) nebulizer solution 15 mcg, 15 mcg, Nebulization, BID  budesonide (PULMICORT) nebulizer suspension 500 mcg, 0.5 mg, Nebulization, BID  guaiFENesin (MUCINEX) extended release tablet 600 mg, 600 mg, Oral, BID  insulin glargine (LANTUS) injection vial 11 Units, 0.15 Units/kg, SubCUTAneous, Nightly  insulin lispro (HUMALOG) injection vial 4 Units, 0.05 Units/kg, SubCUTAneous, TID WC  insulin lispro (HUMALOG) injection vial 0-18 Units, 0-18 Units, SubCUTAneous, TID WC  insulin lispro (HUMALOG) injection vial 0-9 Units, 0-9 Units, SubCUTAneous, Nightly  cefTRIAXone (ROCEPHIN) 1,000 mg in sodium chloride (PF) 10 mL IV syringe, 1,000 mg, IntraVENous, Q24H **AND** doxycycline hyclate (VIBRAMYCIN) capsule 100 mg, 100 mg, Oral, 2 times per day  Prior to Admission medications    Medication Sig Start Date End Date Taking? Authorizing Provider   insulin glargine (BASAGLAR KWIKPEN) 100 UNIT/ML injection pen Inject 5 Units into the skin nightly Indications: Diabetes 1/12/22   Joseline Smith MD   insulin aspart (NOVOLOG FLEXPEN) 100 UNIT/ML injection pen Inject 4 Units into the skin 3 times daily (before meals) Indications: Diabetes 1/12/22   Joseline Smith MD   lactulose (CHRONULAC) 10 GM/15ML solution Take 15 g by mouth 3 times daily With meals for constipation, hold for loose stools    Historical Provider, MD   cloNIDine (CATAPRES) 0.1 MG/24HR PTWK Place 1 patch onto the skin once a week 12/28/21   ROMINA Howell CNP   nicotine (NICODERM CQ) 14 MG/24HR Place 1 patch onto the skin daily 12/23/21   ROMINA Howell CNP   HYDROcodone-acetaminophen (NORCO) 5-325 MG per tablet Take 1 tablet by mouth every 6 hours as needed for Pain.  12/22/21 12/22/22  Misty Montalvo MD   isosorbide mononitrate (IMDUR) 120 MG extended release tablet Take 1 tablet by mouth daily 11/26/21   Aristeo Arts, APRN - CNP   hydrALAZINE (APRESOLINE) 100 MG tablet Take 1 tablet by mouth every 8 hours 11/25/21   Clovis Arts, APRN - CNP   metoprolol succinate (TOPROL XL) 100 MG extended release tablet Take 1 tablet by mouth every morning (before breakfast)  Patient taking differently: Take 150 mg by mouth every morning (before breakfast)  11/26/21   Clovis Arts, APRN - CNP   NIFEdipine (PROCARDIA XL) 30 MG extended release tablet Take 3 tablets by mouth daily 11/26/21   Aristeo Arts, APRN - CNP   Ergocalciferol (VITAMIN D2) 10 MCG (400 UNIT) TABS Take 1.25 mg by mouth every 14 days    Historical Provider, MD   senna (SENNA-LAX) 8.6 MG tablet Take 1 tablet by mouth daily    Historical Provider, MD   sevelamer (RENVELA) 800 MG tablet Take 1 tablet by mouth 3 times daily (with meals)    Historical Provider, MD   lisinopril (PRINIVIL;ZESTRIL) 20 MG tablet Take 1 tablet by mouth 2 times daily 4/23/19   Dany Jean Baptiste,    albuterol sulfate  (90 Base) MCG/ACT inhaler Inhale 2 puffs into the lungs every 4 hours as needed    Historical Provider, MD   levothyroxine (SYNTHROID) 150 MCG tablet Take 150 mcg by mouth Daily    Historical Provider, MD   DULoxetine (CYMBALTA) 60 MG extended release capsule Take 60 mg by mouth daily    Historical Provider, MD   rOPINIRole (REQUIP) 2 MG tablet Take 4 mg by mouth nightly Indications: Restless Leg Syndrome     Historical Provider, MD   simvastatin (ZOCOR) 40 MG tablet Take 40 mg by mouth nightly     Historical Provider, MD       Allergies:  Penicillins, Lamisil advanced [terbinafine], Reglan [metoclopramide], and Stadol [butorphanol]    Social History:   Social History     Socioeconomic History    Marital status: Single     Spouse name: Not on file    Number of children: 2    Years of education: Not on file    Highest education level: Not on file   Occupational History    Not on file   Tobacco Use    Smoking status: Current Every Day Polyps Father     Stomach Cancer Sister     Breast Cancer Sister 27    Depression Daughter 25        committed suicide    Liver Cancer Neg Hx     Liver Disease Neg Hx     Esophageal Cancer Neg Hx     Rectal Cancer Neg Hx        REVIEW OF SYSTEMS:    CONSTITUTIONAL:  negative for  fevers, chills, sweats, fatigue, malaise, anorexia and weight loss  EYES:  negative for  double vision, blurred vision and visual disturbance  HEENT:  negative for  hearing loss, tinnitus, ear drainage, earaches, nasal congestion, epistaxis, snoring, sore mouth, sore throat, hoarseness and voice change  RESPIRATORY:  negative for  dry cough, cough with sputum, dyspnea, wheezing, hemoptysis, chest pain, pleuritic pain and cyanosis  CARDIOVASCULAR:  negative for  chest pain, palpitations, orthopnea, exertional chest pressure/discomfort, edema  GASTROINTESTINAL:  negative for nausea, vomiting, change in bowel habits, diarrhea, constipation, abdominal pain, jaundice, dysphagia, regurgitation, hematemesis and hemtochezia  GENITOURINARY:  negative for frequency, dysuria, nocturia, hesitancy and hematuria  INTEGUMENT/BREAST:  negative for rash, skin lesion(s), dryness, skin color change, pruritus, changes in hair and changes in nails  HEMATOLOGIC/LYMPHATIC:  negative for easy bruising, bleeding, lymphadenopathy, petechiae and swelling/edema  ALLERGIC/IMMUNOLOGIC:  negative for recurrent infections and urticaria  ENDOCRINE:  negative for heat intolerance, cold intolerance, tremor, weight changes, hair loss and diabetic symptoms including neither polyuria nor polydipsia  MUSCULOSKELETAL:  AS ABOVE  NEUROLOGICAL:  negative for headaches, dizziness, seizures, memory problems, speech problems, visual disturbance, coordination problems, gait problems, tremor, syncope and near syncope  BEHAVIOR/PSYCH:  negative for depressed mood, elated mood, increased agitation and anxiety    PHYSICAL EXAM:    Vitals:   Vitals:    01/22/22 0551   BP: (!) 217/83 Pulse: 74   Resp: 20   Temp: 98.2 °F (36.8 °C)   TempSrc: Oral   SpO2: 99%   Weight: 155 lb 6.8 oz (70.5 kg)   Height: 5' 6\" (1.676 m)     General:  Appears stated age, no distress. Orientation:  Alert and oriented to time, place, and person. Mood and Affect:  Cooperative and pleasant. Gait:  Resting comfortably in bed. Cardiovascular:  Symmetric 1-2 plus pulses in upper and lower extremities. Lymph:  No cervical or inguinal lymphadenopathy noted. Sensation:  Grossly intact to light touch. DTR:  Normal, no pathologic reflexes. Coordination/balance:  NOT TESTED    Musculoskeletal:  Right upper extremity exam:  There is no tenderness to palpation about the shoulder, elbow, wrist or hand. Unrestricted full function motion is present. Stability is normal with provocative tests, 5/5 strength, and skin is normal.      Left upper extremity exam:  There is no tenderness to palpation about the shoulder, elbow, wrist or hand. Unrestricted full function motion is present. Stability is normal with provocative tests, 5/5 strength, and skin is normal.     Right lower extremity exam:  There is no tenderness to palpation about the hip, knee, ankle or foot. Unrestricted full function motion is present. Stability is normal with provocative tests, 5/5 strength, and skin is normal.     Left lower extremity exam:  IMMOBILIZER IN PLACE, NVI DISTALLY.  TTP LEFT FEMUR    DATA:    CBC with Differential:    Lab Results   Component Value Date    WBC 6.6 01/22/2022    RBC 2.69 01/22/2022    HGB 8.2 01/22/2022    HCT 26.6 01/22/2022     01/22/2022    MCV 98.9 01/22/2022    MCH 30.5 01/22/2022    MCHC 30.8 01/22/2022    RDW 18.3 01/22/2022    LYMPHOPCT 11.6 01/22/2022    MONOPCT 7.2 01/22/2022    BASOPCT 1.4 01/22/2022    MONOSABS 0.50 01/22/2022    LYMPHSABS 0.8 01/22/2022    EOSABS 0.30 01/22/2022    BASOSABS 0.10 01/22/2022     CMP:    Lab Results   Component Value Date     01/22/2022    K 3.3 01/22/2022    K 3.6 01/11/2022    CL 88 01/22/2022    CO2 29 01/22/2022    BUN 35 01/22/2022    CREATININE 3.7 01/22/2022    GFRAA 16 01/22/2022    LABGLOM 13 01/22/2022    GLUCOSE 562 01/22/2022    PROT 7.1 01/22/2022    CALCIUM 9.6 01/22/2022    BILITOT 0.3 01/22/2022    ALKPHOS 143 01/22/2022    AST 14 01/22/2022    ALT 18 01/22/2022     BMP:    Lab Results   Component Value Date     01/22/2022    K 3.3 01/22/2022    K 3.6 01/11/2022    CL 88 01/22/2022    CO2 29 01/22/2022    BUN 35 01/22/2022    CREATININE 3.7 01/22/2022    CALCIUM 9.6 01/22/2022    GFRAA 16 01/22/2022    LABGLOM 13 01/22/2022    GLUCOSE 562 01/22/2022       Radiology:   Hemoglobin A1c [[id number]] (Abnormal)    Collected: 01/22/22 0635    Updated: 01/22/22 1009    Specimen Source: Blood     Hemoglobin A1C 10.8 High  %    Comment: HbA1c levels >6% are an indication of hyperglycemia during the preceding 2   to 3 months or longer. HbA1c levels may reach 20% or higher in poorly controlled diabetes. Therapeutic action is suggested at levels above 8%. Diabetes patients with HbA1c levels below 7% meet the goal of the American   Diabetes Association. HbA1c levels below the established reference range may indicate recent   episodes of hypoglycemia, the presence of Hb variants, or shortened lifetime   of erythrocytes.        POCT Glucose [[id number]] (Abnormal)    Collected: 01/22/22 0954    Updated: 01/22/22 1001     POC Glucose 468 High  mg/dl    Performed on [employer]    Comment:  notified      XR CHEST Helen Kulkarni [[id number]]    Resulted: 01/22/22 0830    Updated: 01/22/22 0832    Narrative:     Exam:   XR CHEST PORTABLE     Date:  1/22/2022   History: Emily Cespedes, age  45 years; preop   COMPARISON:  Chest x-ray dated January 10, 2022   Findings :   Mild cardiomegaly. Central pulmonary venous congestion and coarsened   interstitium.  Right infrahilar opacity.  The bones show no acute   pathology.  Vascular stent projecting over the left axilla and thoracic inlet. Impression:     Impression:   Cardiomegaly and interstitial coarsening, concerning for fluid   overload. Right infrahilar opacity, may reflect atelectasis, aspiration or   infection. Signed by Dr Lashell Myrick (1-2 VIEWS) [[id number]]    Resulted: 01/22/22 0725    Updated: 01/22/22 0727    Narrative:     Exam:   XR PELVIS (1-2 VIEWS)     Date:  1/22/2022   History:  Female, age  45 years; fall   COMPARISON:  None. Findings : There is no acute displaced fracture. No dislocation. No suspicious   lytic or blastic lesion. No radiopaque foreign body. Impression:     Impression:   No acute osseous pathology. Signed by Dr Kenan French (2 VIEWS) [[id number]]    Resulted: 01/22/22 0724    Updated: 01/22/22 0726    Narrative:     Exam:   XR FOOT LEFT (2 VIEWS)     Date:  1/22/2022   History:  Female, age  45 years; fall   COMPARISON:  None. Findings : There is no acute displaced fracture. No dislocation. No suspicious   lytic or blastic lesion. No radiopaque foreign body. Vascular calcifications. Impression:     Impression:   No acute osseous pathology. Signed by Dr Cherelle Peralta   XR FEMUR LEFT (MIN 2 VIEWS) [[id number]]    Resulted: 01/22/22 0722    Updated: 01/22/22 0724    Narrative:     Exam:   XR FEMUR LEFT (MIN 2 VIEWS), XR KNEE LEFT (1-2 VIEWS)     Date:  1/22/2022   History:  Female, age  45 years; fall   COMPARISON:  None. Findings :   Demineralized skeleton limits bony details. Linear lucency in the distal femur extending through the posterior   cortex, involving the distal diaphysis, is consistent with an acute   nondisplaced fracture. There is no dislocation. Impression:     Impression:   Acute nondisplaced fracture through the distal femoral diaphysis.    Signed by Dr Cherelle Peralta   XR KNEE LEFT (1-2 VIEWS) [[id number]]    Resulted: 01/22/22 0722    Updated: 01/22/22 0724    Narrative:     Exam:   XR FEMUR LEFT (MIN 2 VIEWS), XR KNEE LEFT (1-2 VIEWS)     Date:  1/22/2022   History:  Female, age  45 years; fall   COMPARISON:  None. Findings :   Demineralized skeleton limits bony details. Linear lucency in the distal femur extending through the posterior   cortex, involving the distal diaphysis, is consistent with an acute   nondisplaced fracture. There is no dislocation. Impression:     Impression:   Acute nondisplaced fracture through the distal femoral diaphysis. Signed by Dr Yazmin Ramirez [[id number]] (Abnormal)    Collected: 01/22/22 0635    Updated: 01/22/22 0710    Specimen Source: Blood     Sodium 130 Low  mmol/L    Potassium 3.3 Low  mmol/L    Chloride 88 Low  mmol/L    CO2 29 mmol/L    Anion Gap 13 mmol/L    Glucose 562 High Panic   mg/dL    BUN 35 High  mg/dL    CREATININE 3.7 High  mg/dL    GFR Non- 13 Abnormal     Comment: This calculation may be inaccurate for patients under the age of 25 years. For ages 25 and older, a GFR >60 mL/min/1.73m2 (not corrected for weight) is   valid for stable renal function. GFR  16 Low     Comment: Chronic Kidney Disease: less than 60 ml/min/1.73 sq. m.         Kidney Failure: less than 15 ml/min/1.73 sq.m. Results valid for patients 18 years and older.         Calcium 9.6 mg/dL    Total Protein 7.1 g/dL    Albumin 4.2 g/dL    Total Bilirubin 0.3 mg/dL    Alkaline Phosphatase 143 High  U/L    ALT 18 U/L    AST 14 U/L   Narrative:     CALL  [facility] tel. ,   Chemistry results called to and read back by Jethro Vaughan in ED, 01/22/2022   07:09, by Jahaira Lin [[id number]]    Collected: 01/22/22 0627    Updated: 01/22/22 0650    Specimen Source: Nasopharyngeal Swab     SARS-CoV-2, NAAT Not Detected    Comment: Rapid NAAT:   Negative results should be treated as presumptive and,   if inconsistent with clinical signs and symptoms or necessary for   patient management, should be tested with an alternative molecular assay. Negative results do not preclude SARS-CoV-2 infection and   should not be used as the sole basis for patient management decisions. This test has been authorized by the FDA under an Emergency Use   Authorization (EUA) for use by authorized laboratories. Fact sheet for Healthcare Providers:   Ajay   Fact sheet for Patients: Ajay     METHODOLOGY: Isothermal Nucleic Acid Amplification               IMPRESSION/RECOMMENDATIONS:    Assessment:   CLOSED, ND, LEFT DISTAL THIRD FEMORAL SHAFT FX S/P FALL  STAGE 5 CKD  HTN  T2DM  HYPERLIPIDEMIA      Plan:  1. D/W DR POWELL, WILL ATTEMPT TO TREAT NON-OP WITH IMMOBILZER AND NWB FOR UP TO 3 MTHS. WILL CHECK SERIAL XRAYS AND TREAT ACCORDINGLY. THANKS FOR THE CONSULT. Approximately 35 minutes was spent face to face with the patient discussing the current condition. All risks and benefits of treatment options were discussed at great length and all expressed understanding and agreement with medial reasoning.     Emani Vergara PA-C 01/22/22 10:26 AM

## 2022-01-22 NOTE — ED PROVIDER NOTES
[facility] [address]  eMERGENCY dEPARTMENT eNCOUnter      Pt Name: Wendy Ng  MRN: [id number]  [place] 1970  Date of evaluation: 1/22/2022  Provider: Sathish Chung MD    CHIEF COMPLAINT       Chief Complaint   Patient presents with    Fall     Pt fell out of wheelchair ambulating to bathroom. Complains of pain from left hip down to left foot.  Hyperglycemia      per EMS         HISTORY OF PRESENT ILLNESS   (Location/Symptom, Timing/Onset,Context/Setting, Quality, Duration, Modifying Factors, Severity)  Note limiting factors. Wendy Ng is a 47 y.o. female who presents to the emergency department after a fall. Pt states her foot gave out and she fell, landing on her left hip and left knee. She has pain in her hip, knee, and foot. She did not hit her head or lose consciousness. She was getting ready for dialysis today. Goes to [facility]. HPI    NursingNotes were reviewed. REVIEW OF SYSTEMS    (2-9 systems for level 4, 10 or more for level 5)     Review of Systems   Constitutional: Negative for chills and fever. HENT: Negative for rhinorrhea and sore throat. Respiratory: Negative for shortness of breath. Cardiovascular: Negative for chest pain and leg swelling. Gastrointestinal: Negative for abdominal pain, diarrhea, nausea and vomiting. Genitourinary: Negative for difficulty urinating. Musculoskeletal: Negative for back pain and neck pain. Knee, hip, foot pain   Skin: Negative for rash. Neurological: Negative for weakness and headaches. Psychiatric/Behavioral: Negative for confusion. A complete review of systems was performed and is negative except as noted above in the HPI.        PAST MEDICAL HISTORY     Past Medical History:   Diagnosis Date    Arthritis     Chronic kidney disease, stage 5, kidney failure (HCC)     Closed fracture of right shoulder girdle, with routine healing, subsequent encounter     DM (diabetes mellitus) type II controlled with renal manifestation ([facility] [address] 06/1993    Gastroparesis     GERD (gastroesophageal reflux disease)     Hemodialysis patient ([facility] [address]     dialysis on tues, thur, and sat at [facility]    Hypertension     Hypothyroid     Nasal congestion     recent    Neuropathy     Noncompliance with medications     Palliative care patient 10/08/2019    Restless leg syndrome          SURGICAL HISTORY       Past Surgical History:   Procedure Laterality Date    BREAST SURGERY Left 2008    infected milk gland removed    BREAST SURGERY Right 5/25/2021    WEDGE EXCISION RIGHT BREAST DUCT WITH LACRIMAL DUCT PROBE, PEC BLOCK performed by Katty Stanton MD at [address], [facility]      2008    COLONOSCOPY Left 9/17/2020    Dr Jigar Caldwell hepatic flexure-Severe tortuosity with severe spasming, 1 yr recall    DIALYSIS FISTULA CREATION Left 1/25/2019    REVISION LEFT UPPER EXTREMITY AV ACCESS WITH LEFT BRACHIAL ARTERY TO LEFT AXILLARY VEIN WITH  INTERPOSITIONAL ARTEGRAFT   performed by Kike Loyd MD at [address]  2012    [address] Right 1/25/2019    INSERTION OF RIGHT INTERNAL JUGULAR HEMODIALYSIS CATHETER performed by Kike Loyd MD at [address] [facility]  08/17/2018    1.  Moderately increased stainable iron identified by special stain in Kupffer cells and occasional hepatocytes. Negative for evidence of significant portal or lobular inflammation. Negative for evidence of significant fibrosis    AL COLONOSCOPY W/BIOPSY SINGLE/MULTIPLE N/A 10/20/2017    Dr Camilo Chaidez prep-Tubular AP (-) dysplasia x 2--3 yr recall    AL EGD TRANSORAL BIOPSY SINGLE/MULTIPLE N/A 12/27/2016    Dr Ruby Villar EGD TRANSORAL BIOPSY SINGLE/MULTIPLE N/A 10/20/2017    Dr Ren Cheek (-)   [facility] VASCULAR SURGERY Left 2016    fistula by Dr Armida Hdez at [address]  10/02/2017    [facility]. Left upper fistulograms/venograms, balloon angioplasty cephalic vein arch 10x80 conquest.    VASCULAR SURGERY  08/2018    FISTULOGRAM    VASCULAR SURGERY  10/29/2018    [facility]. Left upper fistulograms/venograms    VASCULAR SURGERY  12/19/2018    [facility]. Arch aortogram,left upper arteriograms.  VASCULAR SURGERY  03/06/2019    [facility]. Removal of tunneled dialyis catheter right internal jugular vein.  VASCULAR SURGERY  08/28/2019    [facility]. Left upper extremity fistulogram including venography to the superior vena cava. Balloon angioplasty left subclavian vein with 10mm x 40mm conquest balloon. Balloon angioplasty mid/proximal upper arm cephalic vein with 07MI x 40mm conquest balloon. Venograms after balloon angioplasty. Stent left mid/proximal upper arm cephalic vein stenosis fluency 10mm x 60mm self-expanding covered stent. Balloon     VASCULAR SURGERY  08/28/2019    cont angioplasty stent with 10mm x 40mm conquest balloon. Completion venograms left upper extremity. CURRENT MEDICATIONS       Current Discharge Medication List      CONTINUE these medications which have NOT CHANGED    Details   insulin glargine (BASAGLAR KWIKPEN) 100 UNIT/ML injection pen Inject 5 Units into the skin nightly Indications: Diabetes  Qty: 1 pen, Refills: 0      insulin aspart (NOVOLOG FLEXPEN) 100 UNIT/ML injection pen Inject 4 Units into the skin 3 times daily (before meals) Indications: Diabetes  Qty: 5 pen, Refills: 0      lactulose (CHRONULAC) 10 GM/15ML solution Take 15 g by mouth 3 times daily With meals for constipation, hold for loose stools      cloNIDine (CATAPRES) 0.1 MG/24HR PTWK Place 1 patch onto the skin once a week  Qty: 4 patch, Refills: 0      nicotine (NICODERM CQ) 14 MG/24HR Place 1 patch onto the skin daily  Qty: 30 patch, Refills: 3      HYDROcodone-acetaminophen (NORCO) 5-325 MG per tablet Take 1 tablet by mouth every 6 hours as needed for Pain.   Qty: 10 tablet, Refills: 0    Comments: Reduce doses taken as pain becomes manageable  Associated Diagnoses: Discharge from nipple; Lumbar radiculopathy      isosorbide mononitrate (IMDUR) 120 MG extended release tablet Take 1 tablet by mouth daily  Qty: 30 tablet, Refills: 3      hydrALAZINE (APRESOLINE) 100 MG tablet Take 1 tablet by mouth every 8 hours  Qty: 90 tablet, Refills: 3      metoprolol succinate (TOPROL XL) 100 MG extended release tablet Take 1 tablet by mouth every morning (before breakfast)  Qty: 30 tablet, Refills: 3      NIFEdipine (PROCARDIA XL) 30 MG extended release tablet Take 3 tablets by mouth daily  Qty: 30 tablet, Refills: 3      Ergocalciferol (VITAMIN D2) 10 MCG (400 UNIT) TABS Take 1.25 mg by mouth every 14 days      senna (SENNA-LAX) 8.6 MG tablet Take 1 tablet by mouth daily      sevelamer (RENVELA) 800 MG tablet Take 1 tablet by mouth 3 times daily (with meals)      lisinopril (PRINIVIL;ZESTRIL) 20 MG tablet Take 1 tablet by mouth 2 times daily  Qty: 30 tablet, Refills: 0      albuterol sulfate  (90 Base) MCG/ACT inhaler Inhale 2 puffs into the lungs every 4 hours as needed      levothyroxine (SYNTHROID) 150 MCG tablet Take 150 mcg by mouth Daily      DULoxetine (CYMBALTA) 60 MG extended release capsule Take 60 mg by mouth daily      rOPINIRole (REQUIP) 2 MG tablet Take 4 mg by mouth nightly Indications: Restless Leg Syndrome       simvastatin (ZOCOR) 40 MG tablet Take 40 mg by mouth nightly              ALLERGIES     Penicillins, Lamisil advanced [terbinafine], Reglan [metoclopramide], and Stadol [butorphanol]    FAMILY HISTORY       Family History   Problem Relation Age of Onset    Colon Cancer Mother          at 61    Colon Polyps Mother     Lung Cancer Father          at 66    Colon Polyps Father     Stomach Cancer Sister     Breast Cancer Sister 27    Depression Daughter 25        committed suicide    Liver Cancer Neg Hx     Liver Disease Neg Hx     Esophageal Cancer Neg Hx     Rectal Cancer Neg Hx           SOCIAL HISTORY       Social History     Socioeconomic History    Marital status: Single     Spouse name: None    Number of children: 2    Years of education: None    Highest education level: None   Occupational History    None   Tobacco Use    Smoking status: Current Every Day Smoker     Packs/day: 0.50     Years: 33.00     Pack years: 16.50     Types: Cigarettes    Smokeless tobacco: Never Used    Tobacco comment: NOW at 1/4 PD, started at age 24 and has averaged 2 PPD   Vaping Use    Vaping Use: Never used   Substance and Sexual Activity    Alcohol use: No    Drug use: Not Currently     Types: Marijuana Eston Akira)    Sexual activity: Not Currently     Partners: Male   Other Topics Concern    None   Social History Narrative    None     Social Determinants of Health     Financial Resource Strain:     Difficulty of Paying Living Expenses: Not on file   Food Insecurity:     Worried About Running Out of Food in the Last Year: Not on file    Kendy of Food in the Last Year: Not on file   Transportation Needs:     Lack of Transportation (Medical): Not on file    Lack of Transportation (Non-Medical):  Not on file   Physical Activity:     Days of Exercise per Week: Not on file    Minutes of Exercise per Session: Not on file   Stress:     Feeling of Stress : Not on file   Social Connections:     Frequency of Communication with Friends and Family: Not on file    Frequency of Social Gatherings with Friends and Family: Not on file    Attends Evangelical Services: Not on file    Active Member of [address] [place] [address] or Organizations: Not on file    Attends Club or Organization Meetings: Not on file    Marital Status: Not on file   Intimate Partner Violence:     Fear of Current or Ex-Partner: Not on file    Emotionally Abused: Not on file    Physically Abused: Not on file    Sexually Abused: Not on file   Housing Stability:     Unable to Pay for Housing in the Last Year: Not on file    Number of [place] in the Last Year: Not on file    Unstable Housing in the Last Year: Not on file SCREENINGS    Yousuf Coma Scale  Eye Opening: Spontaneous  Best Verbal Response: Oriented  Best Motor Response: Obeys commands  Yousuf Coma Scale Score: 15        PHYSICAL EXAM    (up to 7 for level 4, 8 or more for level 5)     ED Triage Vitals [01/22/22 0551]   BP Temp Temp Source Pulse Resp SpO2 Height Weight   (!) 217/83 98.2 °F (36.8 °C) Oral 74 20 99 % 5' 6\" (1.676 m) 155 lb 6.8 oz (70.5 kg)       Physical Exam  Constitutional:       General: She is not in acute distress. Appearance: She is well-developed. She is not diaphoretic. HENT:      Head: Normocephalic and atraumatic. Eyes:      Pupils: Pupils are equal, round, and reactive to light. Cardiovascular:      Rate and Rhythm: Normal rate and regular rhythm. Heart sounds: Normal heart sounds. Pulmonary:      Effort: Pulmonary effort is normal. No respiratory distress. Breath sounds: Normal breath sounds. Abdominal:      General: Bowel sounds are normal. There is no distension. Palpations: Abdomen is soft. Tenderness: There is no abdominal tenderness. Musculoskeletal:         General: Swelling, tenderness and signs of injury present. Cervical back: Normal range of motion and neck supple. Right lower leg: Edema present. Left lower leg: Edema present. Comments: Left knee pain and swelling, left hip ttp, left foot ttp, bilateral 2+ pitting edema   Skin:     General: Skin is warm and dry. Findings: No rash. Neurological:      Mental Status: She is alert and oriented to person, place, and time. Cranial Nerves: No cranial nerve deficit. Motor: No abnormal muscle tone.       Coordination: Coordination normal.   Psychiatric:         Behavior: Behavior normal.         DIAGNOSTIC RESULTS     EKG: All EKG's are interpreted by the Emergency Department Physician who either signs or Co-signs this chart in the absence of a cardiologist.    Normal sinus rhythm rate 77 LVH lateral T wave inversion and ST depression that is similar to prior    RADIOLOGY:   Non-plain film images such as CT, Ultrasound and MRI are read by the radiologist. Kena Mort images are visualized and preliminarily interpreted by the emergency physician with the below findings:        Interpretation per the Radiologist below, if available at the time of this note:    XR CHEST PORTABLE   Final Result   Impression:   Cardiomegaly and interstitial coarsening, concerning for fluid   overload. Right infrahilar opacity, may reflect atelectasis, aspiration or   infection. Signed by Dr Jennifer Patel      XR FOOT LEFT (2 VIEWS)   Final Result   Impression:   No acute osseous pathology. Signed by Dr Jennifer Patel      XR PELVIS (1-2 VIEWS)   Final Result   Impression:   No acute osseous pathology. Signed by Dr Jennifer Patel      XR FEMUR LEFT (MIN 2 VIEWS)   Final Result   Impression:   Acute nondisplaced fracture through the distal femoral diaphysis. Signed by Dr Jennifer Patel      XR KNEE LEFT (1-2 VIEWS)   Final Result   Impression:   Acute nondisplaced fracture through the distal femoral diaphysis.    Signed by Dr Jennifer Patel            ED BEDSIDE ULTRASOUND:   Performed by ED Physician - none    LABS:  Labs Reviewed   CBC WITH AUTO DIFFERENTIAL - Abnormal; Notable for the following components:       Result Value    RBC 2.69 (*)     Hemoglobin 8.2 (*)     Hematocrit 26.6 (*)     MCHC 30.8 (*)     RDW 18.3 (*)     Platelets 416 (*)     Neutrophils % 75.7 (*)     Lymphocytes % 11.6 (*)     Basophils % 1.4 (*)     Lymphocytes Absolute 0.8 (*)     All other components within normal limits   COMPREHENSIVE METABOLIC PANEL - Abnormal; Notable for the following components:    Sodium 130 (*)     Potassium 3.3 (*)     Chloride 88 (*)     Glucose 562 (*)     BUN 35 (*)     CREATININE 3.7 (*)     GFR Non- 13 (*)     GFR African American 16 (*)     Alkaline Phosphatase 143 (*)     All other components within normal limits    Narrative:     CALL  [place]  [facility] tel. ,  Chemistry results called to and read back by Kel Morgan in ED, 01/22/2022  07:09, by BRENDA   HEMOGLOBIN A1C - Abnormal; Notable for the following components:    Hemoglobin A1C 10.8 (*)     All other components within normal limits   POCT GLUCOSE - Abnormal; Notable for the following components:    POC Glucose 507 (*)     All other components within normal limits   POCT GLUCOSE - Abnormal; Notable for the following components:    POC Glucose 468 (*)     All other components within normal limits   COVID-19, RAPID   CULTURE, BLOOD 1   CULTURE, BLOOD 2   POCT GLUCOSE   POCT GLUCOSE   POCT GLUCOSE   POCT GLUCOSE       All other labs were within normal range or not returned as of this dictation. EMERGENCY DEPARTMENT COURSE and DIFFERENTIALDIAGNOSIS/MDM:   Vitals:    Vitals:    01/22/22 0932 01/22/22 1001 01/22/22 1032 01/22/22 1102   BP: (!) 161/71 (!) 176/72 (!) 153/64 (!) 184/75   Pulse: 80 81 81 82   Resp: 17 12 16 14   Temp:       TempSrc:       SpO2: 94% 95% 91% 95%   Weight:       Height:           MDM  D/w Dr Mouna Lockett. Admit to hospitalist. Long knee immolizer positioned high on thigh. Will discuss with pt op vs no op management. No OR today due to high bg and bp  bp now 179/71  Leita Many with nephrology consulted  D/w hospitalist for admission    CONSULTS:  Priscilla Taylor 761 TO CASE MANAGEMENT  IP CONSULT TO PALLIATIVE CARE  IP CONSULT TO ORTHOPEDIC SURGERY    PROCEDURES:  Unless otherwise notedbelow, none     Procedures    FINAL IMPRESSION     1. Closed fracture of left femur, unspecified fracture morphology, unspecified portion of femur, initial encounter ([facility] [address]    2. Essential hypertension    3.  Hyperglycemia          DISPOSITION/PLAN   DISPOSITION        PATIENT REFERRED TO:  @FUP@    DISCHARGE MEDICATIONS:  Current Discharge Medication List             (Please note that portions of this note were completed with a voice recognition program. Efforts were made to edit the dictations butoccasionally words are mis-transcribed.)    Nelida Hilliard MD (electronically signed)  AttendingEmergency Physician         Nelida Hilliard MD  01/22/22 1716

## 2022-01-22 NOTE — CARE COORDINATION
Call received that patient in pain during dialysis, pain medication was administered to patient in dialysis. Patient rated pain at 10/10.

## 2022-01-22 NOTE — CONSULTS
Nephrology ([facility]) Consult Note      Patient:  Darby Boss  YOB: 1970  Date of Service: 1/22/2022  MRN: [id number]   Acct: [de-identified]   Primary Care Physician: ROMINA Leon  Advance Directive: Full Code  Admit Date: 1/22/2022       Hospital Day: 0  Referring Provider: Kristen Luther MD    Patient independently seen and examined, Chart, Consults, Notes, Operative notes, Labs, Cardiology, and Radiology studies reviewed as available. Subjective:  Darby Boss is a 47 y.o. female for whom we were consulted for evaluation and treatment of end-stage renal disease. Patient had a fall at home from slipping in her bathroom and fell and broke her hip. She is complaining of pain at the site. Recalled no recent issues with dialysis. Denies chest pain, shortness of air, nausea or vomiting. Denies loss of consciousness. She noted no operative plans for fracture management.     Dialysis   Pt was seen on renal replacement therapy and I have personally seen and evaluated the patient and directed the therapy  Modality: Hemodialysis  Access: Arterial Venous Fistula  Location: left upper  QB: 450  QD: 700  UF: 1430      Allergies:  Penicillins, Lamisil advanced [terbinafine], Reglan [metoclopramide], and Stadol [butorphanol]    Medicines:  Current Facility-Administered Medications   Medication Dose Route Frequency Provider Last Rate Last Admin    cloNIDine (CATAPRES) 0.1 MG/24HR 1 patch  1 patch TransDERmal Weekly Renetta Dorantes, APRN - CNP        DULoxetine (CYMBALTA) extended release capsule 60 mg  60 mg Oral Daily Tally Roller, APRN - CNP        vitamin D3 (CHOLECALCIFEROL) tablet 400 Units  400 Units Oral Q14 Days Tally Roller, APRN - CNP        hydrALAZINE (APRESOLINE) tablet 100 mg  100 mg Oral 3 times per day Tally Roller, APRN - CNP        HYDROcodone-acetaminophen (NORCO) 5-325 MG per tablet 1 tablet  1 tablet Oral Q4H PRN Jamar Caldwell ROMINA Dorantes CNP   1 tablet at 01/22/22 1359    isosorbide mononitrate (IMDUR) extended release tablet 120 mg  120 mg Oral Daily ROMINA Howell CNP        lactulose (CHRONULAC) 10 GM/15ML solution 15.3333 g  15.3333 g Oral TID ROMINA Howell CNP        levothyroxine (SYNTHROID) tablet 150 mcg  150 mcg Oral Daily ROMINA Frey CNP        lisinopril (PRINIVIL;ZESTRIL) tablet 20 mg  20 mg Oral BID ROMINA Howell CNP        [START ON 1/23/2022] metoprolol succinate (TOPROL XL) extended release tablet 150 mg  150 mg Oral QAM AC ROMINA Frey CNP        nicotine (NICODERM CQ) 14 MG/24HR 1 patch  1 patch TransDERmal Daily ROMINA Frey CNP        NIFEdipine (PROCARDIA XL) extended release tablet 90 mg  90 mg Oral Daily ROMINA Frey CNP        rOPINIRole (REQUIP) tablet 4 mg  4 mg Oral Nightly ROMINA Howell CNP        senna (SENOKOT) tablet 8.6 mg  1 tablet Oral Daily ROMINA Howell CNP        sevelamer (RENVELA) tablet 800 mg  800 mg Oral TID  ROMINA Frey CNP        atorvastatin (LIPITOR) tablet 40 mg  40 mg Oral Daily ROMINA Frey CNP        glucose (GLUTOSE) 40 % oral gel 15 g  15 g Oral PRN ROMINA Frey CNP        dextrose 50 % IV solution  12.5 g IntraVENous PRN ROMINA Howell CNP        glucagon (rDNA) injection 1 mg  1 mg IntraMUSCular PRN ROMINA Howell CNP        dextrose 5 % solution  100 mL/hr IntraVENous PRN ROMINA Howell CNP        sodium chloride flush 0.9 % injection 5-40 mL  5-40 mL IntraVENous 2 times per day ROMINA Howell CNP        sodium chloride flush 0.9 % injection 5-40 mL  5-40 mL IntraVENous PRN ROMINA Frey CNP        0.9 % sodium chloride infusion  25 mL IntraVENous PRN Gloria Paulie, APRN - CNP        ondansetron (ZOFRAN-ODT) disintegrating tablet 4 mg  4 mg Oral Q8H PRN Suyapa Lard, ROMINA - CNP        Or    ondansetron (ZOFRAN) injection 4 mg  4 mg IntraVENous Q6H PRN Suyapa Lard, ROMINA - ALEX        polyethylene glycol (GLYCOLAX) packet 17 g  17 g Oral Daily PRN Suyapa Lard, ROMINA - CNP        acetaminophen (TYLENOL) tablet 650 mg  650 mg Oral Q6H PRN Suyapa Lard, ROMINA - CNP        Or    acetaminophen (TYLENOL) suppository 650 mg  650 mg Rectal Q6H PRN Suyapa Lard, ROMINA - CNP        aluminum & magnesium hydroxide-simethicone (MAALOX) 200-200-20 MG/5ML suspension 30 mL  30 mL Oral Q6H PRN Suyapa Lard, ROMINA - CNP        heparin (porcine) injection 5,000 Units  5,000 Units SubCUTAneous 3 times per day Suyapa Lard, ROMINA Mercado CNP        ipratropium (ATROVENT) 0.02 % nebulizer solution 0.5 mg  0.5 mg Nebulization Q4H WA Suyapa LardROMINA - CNP   0.5 mg at 01/22/22 1432    Arformoterol Tartrate (BROVANA) nebulizer solution 15 mcg  15 mcg Nebulization BID ROMINA Frey CNP        budesonide (PULMICORT) nebulizer suspension 500 mcg  0.5 mg Nebulization BID Suyapa Lard, ROMINA Mercado CNP        guaiFENesin (MUCINEX) extended release tablet 600 mg  600 mg Oral BID ROMINA Frey CNP        insulin glargine (LANTUS) injection vial 11 Units  0.15 Units/kg SubCUTAneous Nightly ROMINA Frey CNP        insulin lispro (HUMALOG) injection vial 4 Units  0.05 Units/kg SubCUTAneous TID  ROMINA Frey CNP        insulin lispro (HUMALOG) injection vial 0-18 Units  0-18 Units SubCUTAneous TID  ROMINA Frey CNP        insulin lispro (HUMALOG) injection vial 0-9 Units  0-9 Units SubCUTAneous Nightly ROMINA Frey CNP        cefTRIAXone (ROCEPHIN) 1,000 mg in sodium chloride (PF) 10 mL IV syringe  1,000 mg IntraVENous Q24H Renetta Jayna, APRN - CNP        And    doxycycline hyclate (VIBRAMYCIN) capsule 100 mg  100 mg Oral 2 times per day Suyapa Lard, APRN - CNP        albuterol (PROVENTIL) nebulizer solution 2.5 mg  2.5 mg Nebulization Q4H PRN Suyapa Lard, APRN - CNP           Past Medical History:  Past Medical History:   Diagnosis Date    Arthritis     Chronic kidney disease, stage 5, kidney failure (HCC)     Closed fracture of right shoulder girdle, with routine healing, subsequent encounter     DM (diabetes mellitus) type II controlled with renal manifestation ([facility] [address] 06/1993    Gastroparesis     GERD (gastroesophageal reflux disease)     Hemodialysis patient ([facility] [address]     dialysis on tues, thur, and sat [facility]    Hypertension     Hypothyroid     Nasal congestion     recent    Neuropathy     Noncompliance with medications     Palliative care patient 10/08/2019    Restless leg syndrome        Past Surgical History:  Past Surgical History:   Procedure Laterality Date    BREAST SURGERY Left 2008    infected milk gland removed    BREAST SURGERY Right 5/25/2021    WEDGE EXCISION RIGHT BREAST DUCT WITH LACRIMAL DUCT PROBE, PEC BLOCK performed by Hemalatha Ceron MD at [address] [facility]      2008    COLONOSCOPY Left 9/17/2020    Dr Jocelyn Anthony hepatic flexure-Severe tortuosity with severe spasming, 1 yr recall    DIALYSIS FISTULA CREATION Left 1/25/2019    REVISION LEFT UPPER EXTREMITY AV ACCESS WITH LEFT BRACHIAL ARTERY TO LEFT AXILLARY VEIN WITH  INTERPOSITIONAL ARTEGRAFT   performed by Jono Sandy MD at [address]  2012    [address] Right 1/25/2019    INSERTION OF RIGHT INTERNAL JUGULAR HEMODIALYSIS CATHETER performed by Jono Sandy MD at [address] [facility]  08/17/2018    1.  Moderately increased stainable iron identified by special stain in Kupffer cells and occasional hepatocytes. Negative for evidence of significant portal or lobular inflammation.  Negative for evidence of significant fibrosis    [place] COLONOSCOPY W/BIOPSY SINGLE/MULTIPLE N/A 10/20/2017    Dr Neyda Daniels prep-Tubular AP (-) dysplasia x 2--3 yr recall    [facility] TRANSORAL BIOPSY SINGLE/MULTIPLE N/A 2016    Dr Renetta Florian EGD TRANSORAL BIOPSY SINGLE/MULTIPLE N/A 10/20/2017    Dr Sarah Harris (-)    TUBAL LIGATION          VASCULAR SURGERY Left 2016    fistula by Dr Danitza Marshall at [address]  10/02/2017    [facility]. Left upper fistulograms/venograms, balloon angioplasty cephalic vein arch 60T90 conquest.    VASCULAR SURGERY  2018    FISTULOGRAM    VASCULAR SURGERY  10/29/2018    [facility]. Left upper fistulograms/venograms    VASCULAR SURGERY  2018    [facility]. Arch aortogram,left upper arteriograms.  VASCULAR SURGERY  2019    [facility]. Removal of tunneled dialyis catheter right internal jugular vein.  VASCULAR SURGERY  2019    [facility]. Left upper extremity fistulogram including venography to the superior vena cava. Balloon angioplasty left subclavian vein with 10mm x 40mm conquest balloon. Balloon angioplasty mid/proximal upper arm cephalic vein with 54DY x 40mm conquest balloon. Venograms after balloon angioplasty. Stent left mid/proximal upper arm cephalic vein stenosis fluency 10mm x 60mm self-expanding covered stent. Balloon     VASCULAR SURGERY  2019    cont angioplasty stent with 10mm x 40mm conquest balloon. Completion venograms left upper extremity.        Family History  Family History   Problem Relation Age of Onset    Colon Cancer Mother          at 63    Colon Polyps Mother     Lung Cancer Father          at 79    Colon Polyps Father     Stomach Cancer Sister     Breast Cancer Sister 27    Depression Daughter [address]        committed suicide    Liver Cancer Neg Hx     Liver Disease Neg Hx     Esophageal Cancer Neg Hx     Rectal Cancer Neg Hx        Social History  Social History     Socioeconomic History    Marital status: Single     Spouse name: Not on file    Number of children: 2    Years of education: Not on file    Highest education level: Not on file   Occupational History    Not on file   Tobacco Use    Smoking status: Current Every Day Smoker     Packs/day: 0.50     Years: 33.00     Pack years: 16.50     Types: Cigarettes    Smokeless tobacco: Never Used    Tobacco comment: NOW at 1/4 PD, started at age 24 and has averaged 2 PPD   Vaping Use    Vaping Use: Never used   Substance and Sexual Activity    Alcohol use: No    Drug use: Not Currently     Types: Marijuana Sara Bony)    Sexual activity: Not Currently     Partners: Male   Other Topics Concern    Not on file   Social History Narrative    Not on file     Social Determinants of Health     Financial Resource Strain:     Difficulty of Paying Living Expenses: Not on file   Food Insecurity:     Worried About Running Out of Food in the Last Year: Not on file    Kendy of Food in the Last Year: Not on file   Transportation Needs:     Lack of Transportation (Medical): Not on file    Lack of Transportation (Non-Medical):  Not on file   Physical Activity:     Days of Exercise per Week: Not on file    Minutes of Exercise per Session: Not on file   Stress:     Feeling of Stress : Not on file   Social Connections:     Frequency of Communication with Friends and Family: Not on file    Frequency of Social Gatherings with Friends and Family: Not on file    Attends Synagogue Services: Not on file    Active Member of [address] [place] [address] or Organizations: Not on file    Attends Club or Organization Meetings: Not on file    Marital Status: Not on file   Intimate Partner Violence:     Fear of Current or Ex-Partner: Not on file    Emotionally Abused: Not on file    Physically Abused: Not on file    Sexually Abused: Not on file   Housing Stability:     Unable to Pay for Housing in the Last Year: Not on file    Number of [place] in the Last Year: Not on file    Unstable Housing in the Last Year: Not on file         Review of Systems:  History obtained from chart review and the patient  General ROS: No fever or chills  Respiratory ROS: No cough, shortness of breath, wheezing  Cardiovascular ROS: No chest pain or palpitations  Gastrointestinal ROS: No abdominal pain or melena  Genito-Urinary ROS: No dysuria or hematuria  Musculoskeletal ROS: Complaining hip pain without nausea  14 point ROS reviewed with the patient and negative except as noted above and in the HPI unless unable to obtain. Objective:  Patient Vitals for the past 24 hrs:   BP Temp Temp src Pulse Resp SpO2 Height Weight   01/22/22 1102 (!) 184/75   82 14 95 %     01/22/22 1032 (!) 153/64   81 16 91 %     01/22/22 1001 (!) 176/72   81 12 95 %     01/22/22 0932 (!) 161/71   80 17 94 %     01/22/22 0551 (!) 217/83 98.2 °F (36.8 °C) Oral 74 20 99 % 5' 6\" (1.676 m) 155 lb 6.8 oz (70.5 kg)     No intake or output data in the 24 hours ending 01/22/22 1440  General: awake/alert   Chest:  clear to auscultation bilaterally  CVS: regular rate and rhythm  Abdominal: soft, nontender, normal bowel sounds  Extremities: no cyanosis or edema  Skin: warm and dry without rash      Labs:  BMP:   Recent Labs     01/22/22  0635   *   K 3.3*   CL 88*   CO2 29   BUN 35*   CREATININE 3.7*   CALCIUM 9.6     CBC:   Recent Labs     01/22/22  0635   WBC 6.6   HGB 8.2*   HCT 26.6*   MCV 98.9   *     LIVER PROFILE:   Recent Labs     01/22/22  0635   AST 14   ALT 18   BILITOT 0.3   ALKPHOS 143*     PT/INR: No results for input(s): PROTIME, INR in the last 72 hours. APTT: No results for input(s): APTT in the last 72 hours. BNP:  No results for input(s): BNP in the last 72 hours. Ionized Calcium:No results for input(s): IONCA in the last 72 hours. Magnesium:No results for input(s): MG in the last 72 hours. Phosphorus:No results for input(s): PHOS in the last 72 hours.   HgbA1C:   Recent Labs     01/22/22  0635   LABA1C 10.8*     Hepatic:   Recent Labs     01/22/22  0635   ALKPHOS 143*   ALT 18   AST 14   PROT 7.1   BILITOT 0. 3   LABALBU 4.2     Lactic Acid: No results for input(s): LACTA in the last 72 hours. Troponin: No results for input(s): CKTOTAL, CKMB, TROPONINT in the last 72 hours. ABGs: No results for input(s): PH, PCO2, PO2, HCO3, O2SAT in the last 72 hours. CRP:  No results for input(s): CRP in the last 72 hours. Sed Rate:  No results for input(s): SEDRATE in the last 72 hours. Cultures:   No results for input(s): CULTURE in the last 72 hours. No results for input(s): BC, Kvng Tina in the last 72 hours. No results for input(s): CXSURG in the last 72 hours. Radiology reports as per the Radiologist  Radiology: XR PELVIS (1-2 VIEWS)    Result Date: 1/22/2022  Exam:   XR PELVIS (1-2 VIEWS)  Date:  1/22/2022 History:  Female, age  47 years; fall COMPARISON:  None. Findings : There is no acute displaced fracture. No dislocation. No suspicious lytic or blastic lesion. No radiopaque foreign body. Impression: No acute osseous pathology. Signed by Dr Eh Brand    XR FEMUR LEFT (MIN 2 VIEWS)    Result Date: 1/22/2022  Exam:   XR FEMUR LEFT (MIN 2 VIEWS), XR KNEE LEFT (1-2 VIEWS)  Date:  1/22/2022 History:  Female, age  47 years; fall COMPARISON:  None. Findings : Demineralized skeleton limits bony details. Linear lucency in the distal femur extending through the posterior cortex, involving the distal diaphysis, is consistent with an acute nondisplaced fracture. There is no dislocation. Impression: Acute nondisplaced fracture through the distal femoral diaphysis. Signed by Dr Eh Brand    XR KNEE LEFT (1-2 VIEWS)    Result Date: 1/22/2022  Exam:   XR FEMUR LEFT (MIN 2 VIEWS), XR KNEE LEFT (1-2 VIEWS)  Date:  1/22/2022 History:  Female, age  47 years; fall COMPARISON:  None. Findings : Demineralized skeleton limits bony details. Linear lucency in the distal femur extending through the posterior cortex, involving the distal diaphysis, is consistent with an acute nondisplaced fracture.  There is no dislocation. Impression: Acute nondisplaced fracture through the distal femoral diaphysis. Signed by Dr Cherelle Peralta    XR FOOT LEFT (2 VIEWS)    Result Date: 1/22/2022  Exam:   XR FOOT LEFT (2 VIEWS)  Date:  1/22/2022 History:  Female, age  47 years; fall COMPARISON:  None. Findings : There is no acute displaced fracture. No dislocation. No suspicious lytic or blastic lesion. No radiopaque foreign body. Vascular calcifications. Impression: No acute osseous pathology. Signed by Dr Cherelle Peralta    XR CHEST PORTABLE    Result Date: 1/22/2022  Exam:   XR CHEST PORTABLE  Date:  1/22/2022 History:  Female, age  47 years; preop COMPARISON:  Chest x-ray dated January 10, 2022 Findings : Mild cardiomegaly. Central pulmonary venous congestion and coarsened interstitium. Right infrahilar opacity. The bones show no acute pathology. Vascular stent projecting over the left axilla and thoracic inlet. Impression: Cardiomegaly and interstitial coarsening, concerning for fluid overload. Right infrahilar opacity, may reflect atelectasis, aspiration or infection. Signed by Dr Sincere Rodriguez   ESRD  DM2 with nephropathy on long-term insulin, uncontrolled  HTN  Hyponatremia  Hypokalemia  Secondary Hyperparathyroidism  Anemia CKD  Seizure disorder  Hypothyroidism       Plan:  Discussed with patient, nursing  HD today and per routine scheduling  Monitor labs  Pain management per primary service        Thank you for the consult, we appreciate the opportunity to provide care to your patients. Feel free to contact me if I can be of any further assistance.       Thayer Galeazzi, MD  01/22/22  2:40 PM

## 2022-01-22 NOTE — ED NOTES
Bed: 08  Expected date:   Expected time:   Means of arrival:   Comments:  Maddison Jasso [facility]  01/22/22 7833

## 2022-01-22 NOTE — ED TRIAGE NOTES
Patient states she was going to the bathroom before she left for dialysis this morning and she fell out of her wheelchair. States she heard a \"popping sound\" in her knee when she fell. Pt states she missed dialysis today. Per EMS pt blood glucose 495.

## 2022-01-23 LAB
ANION GAP SERPL CALCULATED.3IONS-SCNC: 16 MMOL/L (ref 7–19)
BASOPHILS ABSOLUTE: 0.1 K/UL (ref 0–0.2)
BASOPHILS RELATIVE PERCENT: 1.2 % (ref 0–1)
BUN BLDV-MCNC: 26 MG/DL (ref 6–20)
CALCIUM SERPL-MCNC: 9 MG/DL (ref 8.6–10)
CHLORIDE BLD-SCNC: 98 MMOL/L (ref 98–111)
CO2: 26 MMOL/L (ref 22–29)
CREAT SERPL-MCNC: 3.5 MG/DL (ref 0.5–0.9)
EOSINOPHILS ABSOLUTE: 0.2 K/UL (ref 0–0.6)
EOSINOPHILS RELATIVE PERCENT: 2.5 % (ref 0–5)
GFR AFRICAN AMERICAN: 17
GFR NON-AFRICAN AMERICAN: 14
GLUCOSE BLD-MCNC: 116 MG/DL (ref 70–99)
GLUCOSE BLD-MCNC: 155 MG/DL (ref 70–99)
GLUCOSE BLD-MCNC: 185 MG/DL (ref 74–109)
GLUCOSE BLD-MCNC: 227 MG/DL (ref 70–99)
GLUCOSE BLD-MCNC: 89 MG/DL (ref 70–99)
HCT VFR BLD CALC: 26.4 % (ref 37–47)
HEMOGLOBIN: 7.8 G/DL (ref 12–16)
IMMATURE GRANULOCYTES #: 0 K/UL
LYMPHOCYTES ABSOLUTE: 1.3 K/UL (ref 1.1–4.5)
LYMPHOCYTES RELATIVE PERCENT: 21.9 % (ref 20–40)
MCH RBC QN AUTO: 29.5 PG (ref 27–31)
MCHC RBC AUTO-ENTMCNC: 29.5 G/DL (ref 33–37)
MCV RBC AUTO: 100 FL (ref 81–99)
MONOCYTES ABSOLUTE: 0.7 K/UL (ref 0–0.9)
MONOCYTES RELATIVE PERCENT: 11.4 % (ref 0–10)
NEUTROPHILS ABSOLUTE: 3.7 K/UL (ref 1.5–7.5)
NEUTROPHILS RELATIVE PERCENT: 62.7 % (ref 50–65)
PDW BLD-RTO: 18.9 % (ref 11.5–14.5)
PERFORMED ON: ABNORMAL
PERFORMED ON: NORMAL
PLATELET # BLD: 91 K/UL (ref 130–400)
PMV BLD AUTO: 9.3 FL (ref 9.4–12.3)
POTASSIUM REFLEX MAGNESIUM: 3.6 MMOL/L (ref 3.5–5)
PROCALCITONIN: 0.37 NG/ML (ref 0–0.09)
RBC # BLD: 2.64 M/UL (ref 4.2–5.4)
SODIUM BLD-SCNC: 140 MMOL/L (ref 136–145)
WBC # BLD: 6 K/UL (ref 4.8–10.8)

## 2022-01-23 PROCEDURE — 6360000002 HC RX W HCPCS: Performed by: NURSE PRACTITIONER

## 2022-01-23 PROCEDURE — 36415 COLL VENOUS BLD VENIPUNCTURE: CPT

## 2022-01-23 PROCEDURE — 84145 PROCALCITONIN (PCT): CPT

## 2022-01-23 PROCEDURE — 80048 BASIC METABOLIC PNL TOTAL CA: CPT

## 2022-01-23 PROCEDURE — 6370000000 HC RX 637 (ALT 250 FOR IP): Performed by: NURSE PRACTITIONER

## 2022-01-23 PROCEDURE — 1210000000 HC MED SURG R&B

## 2022-01-23 PROCEDURE — 2580000003 HC RX 258: Performed by: NURSE PRACTITIONER

## 2022-01-23 PROCEDURE — 85025 COMPLETE CBC W/AUTO DIFF WBC: CPT

## 2022-01-23 PROCEDURE — 94640 AIRWAY INHALATION TREATMENT: CPT

## 2022-01-23 PROCEDURE — 82947 ASSAY GLUCOSE BLOOD QUANT: CPT

## 2022-01-23 RX ADMIN — LACTULOSE 15.33 G: 20 SOLUTION ORAL at 08:46

## 2022-01-23 RX ADMIN — LISINOPRIL 20 MG: 10 TABLET ORAL at 08:44

## 2022-01-23 RX ADMIN — GUAIFENESIN 600 MG: 600 TABLET, EXTENDED RELEASE ORAL at 21:14

## 2022-01-23 RX ADMIN — LISINOPRIL 20 MG: 10 TABLET ORAL at 21:14

## 2022-01-23 RX ADMIN — INSULIN GLARGINE 11 UNITS: 100 INJECTION, SOLUTION SUBCUTANEOUS at 21:15

## 2022-01-23 RX ADMIN — IPRATROPIUM BROMIDE 0.5 MG: 0.5 SOLUTION RESPIRATORY (INHALATION) at 06:57

## 2022-01-23 RX ADMIN — INSULIN LISPRO 2 UNITS: 100 INJECTION, SOLUTION INTRAVENOUS; SUBCUTANEOUS at 08:59

## 2022-01-23 RX ADMIN — ROPINIROLE HYDROCHLORIDE 4 MG: 4 TABLET, FILM COATED ORAL at 21:14

## 2022-01-23 RX ADMIN — HYDRALAZINE HYDROCHLORIDE 100 MG: 25 TABLET, FILM COATED ORAL at 01:45

## 2022-01-23 RX ADMIN — LEVOTHYROXINE SODIUM 150 MCG: 0.07 TABLET ORAL at 06:23

## 2022-01-23 RX ADMIN — INSULIN LISPRO 4 UNITS: 100 INJECTION, SOLUTION INTRAVENOUS; SUBCUTANEOUS at 08:56

## 2022-01-23 RX ADMIN — HEPARIN SODIUM 5000 UNITS: 5000 INJECTION INTRAVENOUS; SUBCUTANEOUS at 01:30

## 2022-01-23 RX ADMIN — BUDESONIDE 500 MCG: 0.5 SUSPENSION RESPIRATORY (INHALATION) at 06:57

## 2022-01-23 RX ADMIN — LACTULOSE 15.33 G: 20 SOLUTION ORAL at 14:00

## 2022-01-23 RX ADMIN — SODIUM CHLORIDE, PRESERVATIVE FREE 10 ML: 5 INJECTION INTRAVENOUS at 08:48

## 2022-01-23 RX ADMIN — METOPROLOL SUCCINATE 150 MG: 50 TABLET, EXTENDED RELEASE ORAL at 06:23

## 2022-01-23 RX ADMIN — SODIUM CHLORIDE, PRESERVATIVE FREE 10 ML: 5 INJECTION INTRAVENOUS at 21:15

## 2022-01-23 RX ADMIN — HYDROCODONE BITARTRATE AND ACETAMINOPHEN 1 TABLET: 5; 325 TABLET ORAL at 06:23

## 2022-01-23 RX ADMIN — DULOXETINE 60 MG: 30 CAPSULE, DELAYED RELEASE ORAL at 08:44

## 2022-01-23 RX ADMIN — INSULIN LISPRO 4 UNITS: 100 INJECTION, SOLUTION INTRAVENOUS; SUBCUTANEOUS at 18:18

## 2022-01-23 RX ADMIN — SEVELAMER CARBONATE 800 MG: 800 TABLET, FILM COATED ORAL at 08:44

## 2022-01-23 RX ADMIN — GUAIFENESIN 600 MG: 600 TABLET, EXTENDED RELEASE ORAL at 08:43

## 2022-01-23 RX ADMIN — HEPARIN SODIUM 5000 UNITS: 5000 INJECTION INTRAVENOUS; SUBCUTANEOUS at 08:56

## 2022-01-23 RX ADMIN — ISOSORBIDE MONONITRATE 120 MG: 60 TABLET, EXTENDED RELEASE ORAL at 08:43

## 2022-01-23 RX ADMIN — HYDROCODONE BITARTRATE AND ACETAMINOPHEN 1 TABLET: 5; 325 TABLET ORAL at 18:15

## 2022-01-23 RX ADMIN — ATORVASTATIN CALCIUM 40 MG: 40 TABLET, FILM COATED ORAL at 09:00

## 2022-01-23 RX ADMIN — INSULIN LISPRO 4 UNITS: 100 INJECTION, SOLUTION INTRAVENOUS; SUBCUTANEOUS at 12:00

## 2022-01-23 RX ADMIN — HEPARIN SODIUM 5000 UNITS: 5000 INJECTION INTRAVENOUS; SUBCUTANEOUS at 17:30

## 2022-01-23 RX ADMIN — ARFORMOTEROL TARTRATE 15 MCG: 15 SOLUTION RESPIRATORY (INHALATION) at 06:57

## 2022-01-23 RX ADMIN — SEVELAMER CARBONATE 800 MG: 800 TABLET, FILM COATED ORAL at 12:00

## 2022-01-23 RX ADMIN — NIFEDIPINE 90 MG: 90 TABLET, FILM COATED, EXTENDED RELEASE ORAL at 08:45

## 2022-01-23 RX ADMIN — HYDROCODONE BITARTRATE AND ACETAMINOPHEN 1 TABLET: 5; 325 TABLET ORAL at 02:25

## 2022-01-23 RX ADMIN — DOXYCYCLINE HYCLATE 100 MG: 100 CAPSULE ORAL at 08:45

## 2022-01-23 RX ADMIN — SENNOSIDES 8.6 MG: 8.6 TABLET, COATED ORAL at 08:43

## 2022-01-23 RX ADMIN — SEVELAMER CARBONATE 800 MG: 800 TABLET, FILM COATED ORAL at 17:00

## 2022-01-23 RX ADMIN — INSULIN LISPRO 3 UNITS: 100 INJECTION, SOLUTION INTRAVENOUS; SUBCUTANEOUS at 21:15

## 2022-01-23 RX ADMIN — LACTULOSE 15.33 G: 20 SOLUTION ORAL at 21:14

## 2022-01-23 ASSESSMENT — ENCOUNTER SYMPTOMS
PHOTOPHOBIA: 0
DIARRHEA: 0
ABDOMINAL PAIN: 0
CHEST TIGHTNESS: 0
SORE THROAT: 0
SHORTNESS OF BREATH: 0
COLOR CHANGE: 0
VOMITING: 0
COUGH: 0
NAUSEA: 0

## 2022-01-23 ASSESSMENT — PAIN SCALES - GENERAL
PAINLEVEL_OUTOF10: 2
PAINLEVEL_OUTOF10: 4
PAINLEVEL_OUTOF10: 4
PAINLEVEL_OUTOF10: 2
PAINLEVEL_OUTOF10: 8
PAINLEVEL_OUTOF10: 10
PAINLEVEL_OUTOF10: 7

## 2022-01-23 ASSESSMENT — PAIN DESCRIPTION - LOCATION: LOCATION: LEG

## 2022-01-23 ASSESSMENT — PAIN DESCRIPTION - PROGRESSION: CLINICAL_PROGRESSION: GRADUALLY WORSENING

## 2022-01-23 ASSESSMENT — PAIN DESCRIPTION - ORIENTATION: ORIENTATION: LEFT

## 2022-01-23 ASSESSMENT — PAIN - FUNCTIONAL ASSESSMENT: PAIN_FUNCTIONAL_ASSESSMENT: PREVENTS OR INTERFERES WITH ALL ACTIVE AND SOME PASSIVE ACTIVITIES

## 2022-01-23 ASSESSMENT — PAIN DESCRIPTION - PAIN TYPE: TYPE: ACUTE PAIN

## 2022-01-23 ASSESSMENT — PAIN DESCRIPTION - DIRECTION: RADIATING_TOWARDS: GENERALIZED

## 2022-01-23 ASSESSMENT — PAIN DESCRIPTION - ONSET: ONSET: PROGRESSIVE

## 2022-01-23 ASSESSMENT — PAIN DESCRIPTION - FREQUENCY: FREQUENCY: CONTINUOUS

## 2022-01-23 ASSESSMENT — PAIN DESCRIPTION - DESCRIPTORS: DESCRIPTORS: SHARP;SHOOTING;ACHING

## 2022-01-23 NOTE — PROGRESS NOTES
Orthopedic Surgery Progress Note    Shey Evans  1/23/2022      Subjective:     Systemic or Specific Complaints: STABLE, PAIN IS MODERATE. Objective:     Patient Vitals for the past 24 hrs:   BP Temp Temp src Pulse Resp SpO2   01/23/22 0519 134/76 98.1 °F (36.7 °C)  77 20 97 %   01/22/22 2356 (!) 178/75 98.1 °F (36.7 °C)  84 16 96 %   01/22/22 2152 (!) 179/66 98.4 °F (36.9 °C)  84 18 99 %   01/22/22 1930 (!) 158/65        01/22/22 1622 (!) 216/75 96.8 °F (36 °C) Temporal 81 16 91 %   01/22/22 1510 (!) 220/110        01/22/22 1434      98 %   01/22/22 1102 (!) 184/75   82 14 95 %   01/22/22 1032 (!) 153/64   81 16 91 %   01/22/22 1001 (!) 176/72   81 12 95 %       left lower  General: alert, appears stated age and cooperative   Wound:              Dressing:    Extremity: Distal NVI           DVT Exam: No evidence of DVT seen on physical exam.                   Data Review:  Recent Labs     01/22/22  0635 01/23/22  0537   HGB 8.2* 7.8*     Recent Labs     01/23/22  0537      K 3.6   CREATININE 3.5*       Assessment:     CLOSED, ND, LEFT DISTAL THIRD FEMORAL SHAFT FX S/P FALL  STAGE 5 CKD  HTN  T2DM  HYPERLIPIDEMIA    Plan:      1:  DVT prophylaxis, ICE, elevate  2:  Pain control  3:  Physical therapy/Occupational therapy  4:  Anticipate discharge WHEN MEDICALLY STABLE, WILL MOST LIKELY NEED PLACEMENT AS SHE WILL BE NWB LLE FOR UP TO 12 WEEKS.   5: Non-weight bearing LLE       Electronically signed by Ashely Rodriguez PA-C on 1/23/2022 at 9:54 AM

## 2022-01-23 NOTE — PROGRESS NOTES
Nephrology ([facility]) Consult Note      Patient:  Gio Duarte  YOB: 1970  Date of Service: 1/23/2022  MRN: [id number]   Acct: [de-identified]   Primary Care Physician: ROMINA Houston  Advance Directive: Full Code  Admit Date: 1/22/2022       Hospital Day: 1  Referring Provider: Nelson Marie MD    Patient independently seen and examined, Chart, Consults, Notes, Operative notes, Labs, Cardiology, and Radiology studies reviewed as available. Subjective:  Gio Duarte is a 47 y.o. female for whom we were consulted for evaluation and treatment of end-stage renal disease. Patient had a fall at home from slipping in her bathroom and fell and broke her hip. She is complaining of pain at the site. Recalled no recent issues with dialysis. Denies chest pain, shortness of air, nausea or vomiting. Denies loss of consciousness. She noted no operative plans for fracture management. Today, no overnight events. Her hip pain is improved but still present. She denies chest pain, shortness of at rest, nausea or vomiting. No issues with dialysis yesterday.   Orange juice noted at bedside      Allergies:  Penicillins, Lamisil advanced [terbinafine], Reglan [metoclopramide], and Stadol [butorphanol]    Medicines:  Current Facility-Administered Medications   Medication Dose Route Frequency Provider Last Rate Last Admin    ipratropium (ATROVENT) 0.02 % nebulizer solution 0.5 mg  0.5 mg Nebulization 4x Daily PRN ROMINA Hunter CNP        cloNIDine (CATAPRES) 0.1 MG/24HR 1 patch  1 patch TransDERmal Weekly ROMINA Hunter CNP        DULoxetine (CYMBALTA) extended release capsule 60 mg  60 mg Oral Daily ROMINA Hunter CNP   60 mg at 01/23/22 0844    vitamin D3 (CHOLECALCIFEROL) tablet 400 Units  400 Units Oral Q14 Days ROMINA Hunter CNP   400 Units at 01/22/22 1646    hydrALAZINE (APRESOLINE) tablet 100 mg  100 mg Oral 3 times per day Gloria Paulie, APRN - CNP   100 mg at 01/23/22 0145    HYDROcodone-acetaminophen (Laurann Sous) 5-325 MG per tablet 1 tablet  1 tablet Oral Q4H PRN Gloria Paulie, APRN - CNP   1 tablet at 01/23/22 2824    isosorbide mononitrate (IMDUR) extended release tablet 120 mg  120 mg Oral Daily Gloria Paulie, APRN - CNP   120 mg at 01/23/22 0843    lactulose (CHRONULAC) 10 GM/15ML solution 15.3333 g  15.3333 g Oral TID Gloria Paulie, APRN - CNP   15.3333 g at 01/23/22 0846    levothyroxine (SYNTHROID) tablet 150 mcg  150 mcg Oral Daily Gloria Paulie, APRN - CNP   150 mcg at 01/23/22 1958    lisinopril (PRINIVIL;ZESTRIL) tablet 20 mg  20 mg Oral BID Gloria Paulie, APRN - CNP   20 mg at 01/23/22 0844    metoprolol succinate (TOPROL XL) extended release tablet 150 mg  150 mg Oral QAM AC Gloria Paulie, APRN - CNP   150 mg at 01/23/22 1487    nicotine (NICODERM CQ) 14 MG/24HR 1 patch  1 patch TransDERmal Daily Gloria Paulie, APRN - CNP   1 patch at 01/23/22 0845    rOPINIRole (REQUIP) tablet 4 mg  4 mg Oral Nightly Gloria Paulie, APRN - CNP   4 mg at 01/22/22 2209    senna (SENOKOT) tablet 8.6 mg  1 tablet Oral Daily Gloria Paulie, APRN - CNP   8.6 mg at 01/23/22 0843    sevelamer (RENVELA) tablet 800 mg  800 mg Oral TID WC Gloria Paulie, APRN - CNP   800 mg at 01/23/22 0844    atorvastatin (LIPITOR) tablet 40 mg  40 mg Oral Daily Gloria Paulie, APRN - CNP   40 mg at 01/23/22 0900    glucose (GLUTOSE) 40 % oral gel 15 g  15 g Oral PRN Gloria Paulie, APRN - CNP        dextrose 50 % IV solution  12.5 g IntraVENous PRN Gloria Paulie, APRN - CNP        glucagon (rDNA) injection 1 mg  1 mg IntraMUSCular PRN Gloria Paulie, APRN - CNP        dextrose 5 % solution  100 mL/hr IntraVENous PRN ROMINA Howell CNP        sodium chloride flush 0.9 % injection 5-40 mL  5-40 mL IntraVENous 2 times per day ROMINA Howell CNP   10 mL at 01/23/22 0848    sodium chloride flush 0.9 % injection 5-40 mL  5-40 mL IntraVENous PRN Mer Sandy, APRN - CNP        0.9 % sodium chloride infusion  25 mL IntraVENous PRN Mer Sandy, APRN - CNP        ondansetron (ZOFRAN-ODT) disintegrating tablet 4 mg  4 mg Oral Q8H PRN Mer [place], APRN - CNP        Or    ondansetron (ZOFRAN) injection 4 mg  4 mg IntraVENous Q6H PRN Mer Sandy, APRN - CNP        polyethylene glycol (GLYCOLAX) packet 17 g  17 g Oral Daily PRN Mer [place], APRN - CNP        acetaminophen (TYLENOL) tablet 650 mg  650 mg Oral Q6H PRN Mer Sandy, APRN - CNP        Or    acetaminophen (TYLENOL) suppository 650 mg  650 mg Rectal Q6H PRN Mer Sandy, APRN - CNP        aluminum & magnesium hydroxide-simethicone (MAALOX) 200-200-20 MG/5ML suspension 30 mL  30 mL Oral Q6H PRN Mer Sandy, APRN - CNP        heparin (porcine) injection 5,000 Units  5,000 Units SubCUTAneous 3 times per day Mer [place], APRN - CNP   5,000 Units at 01/23/22 0856    guaiFENesin (MUCINEX) extended release tablet 600 mg  600 mg Oral BID Mer [place], APRN - CNP   600 mg at 01/23/22 0843    insulin glargine (LANTUS) injection vial 11 Units  0.15 Units/kg SubCUTAneous Nightly Mer V[place], APRN - CNP   11 Units at 01/22/22 2212    insulin lispro (HUMALOG) injection vial 4 Units  0.05 Units/kg SubCUTAneous TID WC Mer Sandy, APRN - CNP   4 Units at 01/23/22 0856    insulin lispro (HUMALOG) injection vial 0-18 Units  0-18 Units SubCUTAneous TID WC Mer Vicks[place], APRN - CNP   2 Units at 01/23/22 0859    insulin lispro (HUMALOG) injection vial 0-9 Units  0-9 Units SubCUTAneous Nightly Renetta Dorantes, APRN - CNP        albuterol (PROVENTIL) nebulizer solution 2.5 mg  2.5 mg Nebulization Q4H PRN Mer V[place], APRN - CNP        NIFEdipine (PROCARDIA XL) extended release tablet 90 mg  90 mg Oral Daily Mer Sandy, APRN - CNP   90 mg at 01/23/22 0845       Past Medical History:  Past Medical History:   Diagnosis Date    Arthritis     Chronic kidney disease, stage 5, kidney failure [address]     Closed fracture of right shoulder girdle, with routine healing, subsequent encounter     DM (diabetes mellitus) type II controlled with renal manifestation ([facility] 06/1993    Gastroparesis     GERD (gastroesophageal reflux disease)     Hemodialysis patient ([facility]     dialysis on tues, thur, and sat [facility]    Hypertension     Hypothyroid     Nasal congestion     recent    Neuropathy     Noncompliance with medications     Palliative care patient 10/08/2019    Restless leg syndrome        Past Surgical History:  Past Surgical History:   Procedure Laterality Date    BREAST SURGERY Left 2008    infected milk gland removed    BREAST SURGERY Right 5/25/2021    WEDGE EXCISION RIGHT BREAST DUCT WITH LACRIMAL DUCT PROBE, PEC BLOCK performed by Paige Bloch, MD at [facility]      2008    COLONOSCOPY Left 9/17/2020    Dr Joy Sol hepatic flexure-Severe tortuosity with severe spasming, 1 yr recall    DIALYSIS FISTULA CREATION Left 1/25/2019    REVISION LEFT UPPER EXTREMITY AV ACCESS WITH LEFT BRACHIAL ARTERY TO LEFT AXILLARY VEIN WITH  INTERPOSITIONAL ARTEGRAFT   performed by Janee Echeverria MD at [address]  2012    [address] Right 1/25/2019    INSERTION OF RIGHT INTERNAL JUGULAR HEMODIALYSIS CATHETER performed by Janee Echeverria MD at [facility]  08/17/2018    1.  Moderately increased stainable iron identified by special stain in Kupffer cells and occasional hepatocytes. Negative for evidence of significant portal or lobular inflammation.  Negative for evidence of significant fibrosis    DE COLONOSCOPY W/BIOPSY SINGLE/MULTIPLE N/A 10/20/2017    Dr Joni Mcgrath prep-Tubular AP (-) dysplasia x 2--3 yr recall    DE EGD TRANSORAL BIOPSY SINGLE/MULTIPLE N/A 2016    Dr Amezquita Section EGD TRANSORAL BIOPSY SINGLE/MULTIPLE N/A 10/20/2017    Dr Elba Major (-)    TUBAL LIGATION          VASCULAR SURGERY Left 2016    fistula by Dr Matthew Porras at [address]  10/02/2017    [facility]. Left upper fistulograms/venograms, balloon angioplasty cephalic vein arch [id number] conquest.    VASCULAR SURGERY  2018    FISTULOGRAM    VASCULAR SURGERY  10/29/2018    [facility]. Left upper fistulograms/venograms    VASCULAR SURGERY  2018    [facility]. Arch aortogram,left upper arteriograms.  VASCULAR SURGERY  2019    [facility]. Removal of tunneled dialyis catheter right internal jugular vein.  VASCULAR SURGERY  2019    [facility]. Left upper extremity fistulogram including venography to the superior vena cava. Balloon angioplasty left subclavian vein with 10mm x 40mm conquest balloon. Balloon angioplasty mid/proximal upper arm cephalic vein with 80YK x 40mm conquest balloon. Venograms after balloon angioplasty. Stent left mid/proximal upper arm cephalic vein stenosis fluency 10mm x 60mm self-expanding covered stent. Balloon     VASCULAR SURGERY  2019    cont angioplasty stent with 10mm x 40mm conquest balloon. Completion venograms left upper extremity.        Family History  Family History   Problem Relation Age of Onset    Colon Cancer Mother          at 63    Colon Polyps Mother     Lung Cancer Father          at 79    Colon Polyps Father     Stomach Cancer Sister     Breast Cancer Sister 27    Depression Daughter 25        committed suicide    Liver Cancer Neg Hx     Liver Disease Neg Hx     Esophageal Cancer Neg Hx     Rectal Cancer Neg Hx        Social History  Social History     Socioeconomic History    Marital status: Single     Spouse name: Not on file    Number of children: 2    Years of education: Not on file    Highest education level: Not on file   Occupational History    Not on file   Tobacco Use    Smoking status: Current Every Day Smoker     Packs/day: 0.50     Years: 33.00     Pack years: 16.50     Types: Cigarettes    Smokeless tobacco: Never Used    Tobacco comment: NOW at 1/4 PD, started at age 24 and has averaged 2 PPD   Vaping Use    Vaping Use: Never used   Substance and Sexual Activity    Alcohol use: No    Drug use: Not Currently     Types: Marijuana Nicole Bachelor)    Sexual activity: Not Currently     Partners: Male   Other Topics Concern    Not on file   Social History Narrative    Not on file     Social Determinants of Health     Financial Resource Strain:     Difficulty of Paying Living Expenses: Not on file   Food Insecurity:     Worried About Running Out of Food in the Last Year: Not on file    Kendy of Food in the Last Year: Not on file   Transportation Needs:     Lack of Transportation (Medical): Not on file    Lack of Transportation (Non-Medical):  Not on file   Physical Activity:     Days of Exercise per Week: Not on file    Minutes of Exercise per Session: Not on file   Stress:     Feeling of Stress : Not on file   Social Connections:     Frequency of Communication with Friends and Family: Not on file    Frequency of Social Gatherings with Friends and Family: Not on file    Attends Church Services: Not on file    Active Member of [address] [place] [address] [employer] or Organizations: Not on file    Attends Club or Organization Meetings: Not on file    Marital Status: Not on file   Intimate Partner Violence:     Fear of Current or Ex-Partner: Not on file    Emotionally Abused: Not on file    Physically Abused: Not on file    Sexually Abused: Not on file   Housing Stability:     Unable to Pay for Housing in the Last Year: Not on file    Number of [place] in the Last Year: Not on file    Unstable Housing in the Last Year: Not on file         Review of Systems:  History obtained from chart review and the patient  General ROS: No fever or chills  Respiratory ROS: No cough, shortness of breath, wheezing  Cardiovascular ROS: No chest pain or palpitations  Gastrointestinal ROS: No abdominal pain or melena  Genito-Urinary ROS: No dysuria or hematuria  Musculoskeletal ROS: Complaining hip pain without nausea  14 point ROS reviewed with the patient and negative except as noted above and in the HPI unless unable to obtain. Objective:  Patient Vitals for the past 24 hrs:   BP Temp Temp src Pulse Resp SpO2   01/23/22 0519 134/76 98.1 °F (36.7 °C)  77 20 97 %   01/22/22 2356 (!) 178/75 98.1 °F (36.7 °C)  84 16 96 %   01/22/22 2152 (!) 179/66 98.4 °F (36.9 °C)  84 18 99 %   01/22/22 1930 (!) 158/65        01/22/22 1622 (!) 216/75 96.8 °F (36 °C) Temporal 81 16 91 %       Intake/Output Summary (Last 24 hours) at 1/23/2022 1549  Last data filed at 1/23/2022 1401  Gross per 24 hour   Intake 570 ml   Output    Net 570 ml     General: awake/alert   Chest:  clear to auscultation bilaterally  CVS: regular rate and rhythm  Abdominal: soft, nontender, normal bowel sounds  Extremities: no cyanosis or edema  Skin: warm and dry without rash      Labs:  BMP:   Recent Labs     01/22/22  0635 01/23/22  0537   * 140   K 3.3* 3.6   CL 88* 98   CO2 29 26   BUN 35* 26*   CREATININE 3.7* 3.5*   CALCIUM 9.6 9.0     CBC:   Recent Labs     01/22/22  0635 01/23/22  0537   WBC 6.6 6.0   HGB 8.2* 7.8*   HCT 26.6* 26.4*   MCV 98.9 100.0*   * 91*     LIVER PROFILE:   Recent Labs     01/22/22  0635   AST 14   ALT 18   BILITOT 0.3   ALKPHOS 143*     PT/INR: No results for input(s): PROTIME, INR in the last 72 hours. APTT: No results for input(s): APTT in the last 72 hours. BNP:  No results for input(s): BNP in the last 72 hours. Ionized Calcium:No results for input(s): IONCA in the last 72 hours. Magnesium:No results for input(s): MG in the last 72 hours. Phosphorus:No results for input(s): PHOS in the last 72 hours.   HgbA1C:   Recent Labs     01/22/22  0635   LABA1C 10.8*     Hepatic:   Recent Labs 01/22/22  0635   ALKPHOS 143*   ALT 18   AST 14   PROT 7.1   BILITOT 0.3   LABALBU 4.2     Lactic Acid: No results for input(s): LACTA in the last 72 hours. Troponin: No results for input(s): CKTOTAL, CKMB, TROPONINT in the last 72 hours. ABGs: No results for input(s): PH, PCO2, PO2, HCO3, O2SAT in the last 72 hours. CRP:  No results for input(s): CRP in the last 72 hours. Sed Rate:  No results for input(s): SEDRATE in the last 72 hours. Cultures:   No results for input(s): CULTURE in the last 72 hours. No results for input(s): BC, Lonn Nepalese in the last 72 hours. No results for input(s): CXSURG in the last 72 hours. Radiology reports as per the Radiologist  Radiology: XR PELVIS (1-2 VIEWS)    Result Date: 1/22/2022  Exam:   XR PELVIS (1-2 VIEWS)  Date:  1/22/2022 History:  Female, age  47 years; fall COMPARISON:  None. Findings : There is no acute displaced fracture. No dislocation. No suspicious lytic or blastic lesion. No radiopaque foreign body. Impression: No acute osseous pathology. Signed by Dr Howard Cade    XR FEMUR LEFT (MIN 2 VIEWS)    Result Date: 1/22/2022  Exam:   XR FEMUR LEFT (MIN 2 VIEWS), XR KNEE LEFT (1-2 VIEWS)  Date:  1/22/2022 History:  Female, age  47 years; fall COMPARISON:  None. Findings : Demineralized skeleton limits bony details. Linear lucency in the distal femur extending through the posterior cortex, involving the distal diaphysis, is consistent with an acute nondisplaced fracture. There is no dislocation. Impression: Acute nondisplaced fracture through the distal femoral diaphysis. Signed by Dr Howard Cade    XR KNEE LEFT (1-2 VIEWS)    Result Date: 1/22/2022  Exam:   XR FEMUR LEFT (MIN 2 VIEWS), XR KNEE LEFT (1-2 VIEWS)  Date:  1/22/2022 History:  Female, age  47 years; fall COMPARISON:  None. Findings : Demineralized skeleton limits bony details.  Linear lucency in the distal femur extending through the posterior cortex, involving the distal diaphysis, is consistent with an acute nondisplaced fracture. There is no dislocation. Impression: Acute nondisplaced fracture through the distal femoral diaphysis. Signed by Dr Dick Dallas    XR FOOT LEFT (2 VIEWS)    Result Date: 1/22/2022  Exam:   XR FOOT LEFT (2 VIEWS)  Date:  1/22/2022 History:  Female, age  47 years; fall COMPARISON:  None. Findings : There is no acute displaced fracture. No dislocation. No suspicious lytic or blastic lesion. No radiopaque foreign body. Vascular calcifications. Impression: No acute osseous pathology. Signed by Dr Dick Dallas    XR CHEST PORTABLE    Result Date: 1/22/2022  Exam:   XR CHEST PORTABLE  Date:  1/22/2022 History:  Female, age  47 years; preop COMPARISON:  Chest x-ray dated January 10, 2022 Findings : Mild cardiomegaly. Central pulmonary venous congestion and coarsened interstitium. Right infrahilar opacity. The bones show no acute pathology. Vascular stent projecting over the left axilla and thoracic inlet. Impression: Cardiomegaly and interstitial coarsening, concerning for fluid overload. Right infrahilar opacity, may reflect atelectasis, aspiration or infection. Signed by Dr Marquis Kruse   ESRD  DM2 with nephropathy on long-term insulin, uncontrolled  HTN  Hyponatremia  Hypokalemia  Secondary Hyperparathyroidism  Anemia CKD  Seizure disorder  Hypothyroidism       Plan:  Discussed with patient, nursing  HD 1/22 and per routine scheduling  Monitor labs  Pain management per primary service  Low potassium diet reviewed with patient      Thank you for the consult, we appreciate the opportunity to provide care to your patients. Feel free to contact me if I can be of any further assistance.       Jani Galicia MD  01/23/22  3:49 PM

## 2022-01-23 NOTE — PROGRESS NOTES
[facility]      Patient:  Yulissa Mcdonald  YOB: 1970  Date of Service: 1/23/2022  MRN: [id number]   Acct: [de-identified]   Primary Care Physician: ROMINA Bright  Advance Directive: Full Code  Admit Date: 1/22/2022       Hospital Day: 1  Portions of this note have been copied forward, however, changed to reflect the most current clinical status of this patient. CHIEF COMPLAINT fall, left leg pain    SUBJECTIVE:  Patient states she is still having left leg pain. She denies shortness of breath or cough. She denies recent fever or chills. CUMULATIVE HOSPITAL COURSE:  The patient is a 47 y.o. female with history as outlined below who presented to [address] [place] [address] ED complaining of left leg pain after fall. The patient states she was getting ready for dialysis the morning of admission and fell landing on her left leg. She reported hearing \"a pop\" in her left knee when she fell. She denied hitting her head or LOC. ED work-up indicated a left acute distal femur fracture on x-ray, a right infrahilar opacity and fluid overload on chest x-ray, glucose greater than 897, and systolic pressure over 875. BP was treated in ED and is down to 180s, glucose treated as well. Patient admitted to hospitialist service for femur fracture, hyperglycemia, and hypertensive urgency. Home BP medications resumed and BP now within acceptable limits. Resumes Lantus and prandial insulin, with glucose now within acceptable limits as well. Pt evaluated by orthopedics and recommended nonsurgical management at this time. Knee immobilizer placed and patient to be nonweight bearing on the LLE. PT/OT consulted. Case management asked to assist in SNF placement. Review of Systems:   Review of Systems   Constitutional: Negative for chills, fatigue and fever. HENT: Positive for mouth sores (reports \"bad teeth\"). Negative for sore throat. Eyes: Negative for photophobia and visual disturbance.    Respiratory: Negative for cough, chest tightness and shortness of breath. Cardiovascular: Negative for chest pain, palpitations and leg swelling. Gastrointestinal: Negative for abdominal pain, diarrhea, nausea and vomiting. Genitourinary: Negative for difficulty urinating, dysuria, frequency and urgency. Musculoskeletal: Positive for arthralgias. Skin: Negative for color change and wound. Neurological: Negative for dizziness, syncope, weakness, numbness and headaches. Psychiatric/Behavioral: Negative for agitation and confusion. 14 point review of systems is negative except as specifically addressed above. Objective:   VITALS:  /76   Pulse 77   Temp 98.1 °F (36.7 °C)   Resp 20   Ht 5' 6\" (1.676 m)   Wt 155 lb 6.8 oz (70.5 kg)   SpO2 97%   BMI 25.09 kg/m²   24HR INTAKE/OUTPUT:    Intake/Output Summary (Last 24 hours) at 1/23/2022 1030  Last data filed at 1/23/2022 0941  Gross per 24 hour   Intake 330 ml   Output    Net 330 ml       Physical Exam  Vitals and nursing note reviewed. Constitutional:       Appearance: She is not ill-appearing. HENT:      Head: Normocephalic and atraumatic. Mouth/Throat:      Mouth: Mucous membranes are moist.      Comments: Multiple broken and missing teeth, no redness, swelling or drainage noted  Eyes:      Extraocular Movements: Extraocular movements intact. Pupils: Pupils are equal, round, and reactive to light. Cardiovascular:      Rate and Rhythm: Normal rate and regular rhythm. Pulses: Normal pulses. Heart sounds: Normal heart sounds. Pulmonary:      Effort: Pulmonary effort is normal.      Breath sounds: Normal breath sounds. Abdominal:      General: Abdomen is flat. Bowel sounds are normal. There is no distension. Palpations: Abdomen is soft. Tenderness: There is no abdominal tenderness. There is no guarding. Musculoskeletal:         General: Tenderness and signs of injury present.       Cervical back: Normal range of motion and neck supple. Right lower leg: No edema. Left lower leg: No edema. Comments: Knee immobilizer on LLE     Skin:     General: Skin is warm and dry. Capillary Refill: Capillary refill takes less than 2 seconds. Neurological:      General: No focal deficit present. Mental Status: She is alert and oriented to person, place, and time. Psychiatric:         Mood and Affect: Mood normal.         Behavior: Behavior normal.         Thought Content: Thought content normal.         Judgment: Judgment normal.             Medications:      dextrose      sodium chloride        cloNIDine  1 patch TransDERmal Weekly    DULoxetine  60 mg Oral Daily    vitamin D3  400 Units Oral Q14 Days    hydrALAZINE  100 mg Oral 3 times per day    isosorbide mononitrate  120 mg Oral Daily    lactulose  15.3333 g Oral TID    levothyroxine  150 mcg Oral Daily    lisinopril  20 mg Oral BID    metoprolol succinate  150 mg Oral QAM AC    nicotine  1 patch TransDERmal Daily    rOPINIRole  4 mg Oral Nightly    senna  1 tablet Oral Daily    sevelamer  800 mg Oral TID WC    atorvastatin  40 mg Oral Daily    sodium chloride flush  5-40 mL IntraVENous 2 times per day    heparin (porcine)  5,000 Units SubCUTAneous 3 times per day    guaiFENesin  600 mg Oral BID    insulin glargine  0.15 Units/kg SubCUTAneous Nightly    insulin lispro  0.05 Units/kg SubCUTAneous TID WC    insulin lispro  0-18 Units SubCUTAneous TID WC    insulin lispro  0-9 Units SubCUTAneous Nightly    NIFEdipine  90 mg Oral Daily     ipratropium, HYDROcodone-acetaminophen, glucose, dextrose, glucagon (rDNA), dextrose, sodium chloride flush, sodium chloride, ondansetron **OR** ondansetron, polyethylene glycol, acetaminophen **OR** acetaminophen, aluminum & magnesium hydroxide-simethicone, albuterol  ADULT DIET; Easy to Chew; 4 carb choices (60 gm/meal); Low Fat/Low Chol/High Fiber/LUIS; Low Sodium (2 gm); Low Potassium (Less than 3000 mg/day);  Low Phosphorus (Less than 1000 mg); 60 to 80 gm     Lab and other Data:     Recent Labs     01/22/22  0635 01/23/22  0537   WBC 6.6 6.0   HGB 8.2* 7.8*   * 91*     Recent Labs     01/22/22  0635 01/23/22  0537   * 140   K 3.3* 3.6   CL 88* 98   CO2 29 26   BUN 35* 26*   CREATININE 3.7* 3.5*   GLUCOSE 562* 185*     Recent Labs     01/22/22  0635   AST 14   ALT 18   BILITOT 0.3   ALKPHOS 143*     Troponin T: No results for input(s): TROPONINI in the last 72 hours. Pro-BNP: No results for input(s): BNP in the last 72 hours. INR: No results for input(s): INR in the last 72 hours. UA:No results for input(s): NITRITE, COLORU, PHUR, LABCAST, WBCUA, RBCUA, MUCUS, TRICHOMONAS, YEAST, BACTERIA, CLARITYU, SPECGRAV, LEUKOCYTESUR, UROBILINOGEN, BILIRUBINUR, BLOODU, GLUCOSEU, AMORPHOUS in the last 72 hours. Invalid input(s): KETONESU  A1C:   Recent Labs     01/22/22  0635   LABA1C 10.8*     ABG:No results for input(s): PHART, KKE6VXT, PO2ART, RDQ1IRT, BEART, HGBAE, G9RWDWYX, CARBOXHGBART in the last 72 hours. RAD:   XR PELVIS (1-2 VIEWS)    Result Date: 1/22/2022  Impression: No acute osseous pathology. Signed by Dr Jaimee Nicole    XR FEMUR LEFT (MIN 2 VIEWS)    Result Date: 1/22/2022  Impression: Acute nondisplaced fracture through the distal femoral diaphysis. Signed by Dr Jaimee Nicole    XR KNEE LEFT (1-2 VIEWS)    Result Date: 1/22/2022  Impression: Acute nondisplaced fracture through the distal femoral diaphysis. Signed by Dr Jaimee Nicole    XR FOOT LEFT (2 VIEWS)    Result Date: 1/22/2022  Impression: No acute osseous pathology. Signed by Dr Jaimee Nicole    XR CHEST PORTABLE    Result Date: 1/22/2022  Impression: Cardiomegaly and interstitial coarsening, concerning for fluid overload. Right infrahilar opacity, may reflect atelectasis, aspiration or infection.  Signed by Dr Jaimee Nicole         Assessment/Plan   Principal Problem:    Closed fracture of left distal femur ([address]   -assessed by ortho, recommends nonsurgical management at this time   -PT/OT   -case management to assist in DC planning, will need SNF   -knee immobilizer placed    Active Problems:    Anemia in chronic kidney disease (CKD)   -noted   -monitor CBC      Noncompliance of patient with renal dialysis ([address]   -noted      Uncontrolled type 2 diabetes mellitus with complication, with long-term current use of insulin ([address] / Hyperglycemia due to type 2 diabetes mellitus ([address]   -noted   -continue current insulin regimen, adjust as needed to keep tight control of glucose and improve healing   -card controlled diet   -hypoglycemia protocol in place      ESRD (end stage renal disease) on dialysis [facility])   -nephrology on board   -dialysis T, TH,S   -last treatment 1/22/22    Uncontrolled hypertension   -much improved   -continue current regimen   -monitor closely      Medically noncompliant   -noted    DVT Prophylaxis: heparin subcu        ROMINA Valdez - CNP, 1/23/2022 10:30 AM

## 2022-01-23 NOTE — PROGRESS NOTES
Physical Therapy    Eval attempted. Pt declines mobility today due to pain. Will cont to follow.     Electronically signed by Danilo Antonio PT on 1/23/2022 at 4:03 PM

## 2022-01-24 LAB
ANION GAP SERPL CALCULATED.3IONS-SCNC: 16 MMOL/L (ref 7–19)
BASOPHILS ABSOLUTE: 0.1 K/UL (ref 0–0.2)
BASOPHILS RELATIVE PERCENT: 1.1 % (ref 0–1)
BUN BLDV-MCNC: 44 MG/DL (ref 6–20)
CALCIUM SERPL-MCNC: 8.7 MG/DL (ref 8.6–10)
CHLORIDE BLD-SCNC: 91 MMOL/L (ref 98–111)
CO2: 25 MMOL/L (ref 22–29)
CREAT SERPL-MCNC: 5 MG/DL (ref 0.5–0.9)
EKG P AXIS: 85 DEGREES
EKG P-R INTERVAL: 162 MS
EKG Q-T INTERVAL: 412 MS
EKG QRS DURATION: 106 MS
EKG QTC CALCULATION (BAZETT): 441 MS
EKG T AXIS: 122 DEGREES
EOSINOPHILS ABSOLUTE: 0.2 K/UL (ref 0–0.6)
EOSINOPHILS RELATIVE PERCENT: 3.7 % (ref 0–5)
GFR AFRICAN AMERICAN: 11
GFR NON-AFRICAN AMERICAN: 9
GLUCOSE BLD-MCNC: 129 MG/DL (ref 74–109)
GLUCOSE BLD-MCNC: 138 MG/DL (ref 70–99)
GLUCOSE BLD-MCNC: 153 MG/DL (ref 70–99)
GLUCOSE BLD-MCNC: 89 MG/DL (ref 70–99)
GLUCOSE BLD-MCNC: 95 MG/DL (ref 70–99)
HCT VFR BLD CALC: 25.5 % (ref 37–47)
HEMOGLOBIN: 7.7 G/DL (ref 12–16)
IMMATURE GRANULOCYTES #: 0 K/UL
LYMPHOCYTES ABSOLUTE: 1.1 K/UL (ref 1.1–4.5)
LYMPHOCYTES RELATIVE PERCENT: 16.4 % (ref 20–40)
MCH RBC QN AUTO: 30.1 PG (ref 27–31)
MCHC RBC AUTO-ENTMCNC: 30.2 G/DL (ref 33–37)
MCV RBC AUTO: 99.6 FL (ref 81–99)
MONOCYTES ABSOLUTE: 0.8 K/UL (ref 0–0.9)
MONOCYTES RELATIVE PERCENT: 12.3 % (ref 0–10)
NEUTROPHILS ABSOLUTE: 4.3 K/UL (ref 1.5–7.5)
NEUTROPHILS RELATIVE PERCENT: 66.2 % (ref 50–65)
PDW BLD-RTO: 18.9 % (ref 11.5–14.5)
PERFORMED ON: ABNORMAL
PERFORMED ON: ABNORMAL
PERFORMED ON: NORMAL
PERFORMED ON: NORMAL
PLATELET # BLD: 95 K/UL (ref 130–400)
PMV BLD AUTO: 10 FL (ref 9.4–12.3)
POTASSIUM REFLEX MAGNESIUM: 4.1 MMOL/L (ref 3.5–5)
RBC # BLD: 2.56 M/UL (ref 4.2–5.4)
SODIUM BLD-SCNC: 132 MMOL/L (ref 136–145)
WBC # BLD: 6.5 K/UL (ref 4.8–10.8)

## 2022-01-24 PROCEDURE — 93010 ELECTROCARDIOGRAM REPORT: CPT | Performed by: INTERNAL MEDICINE

## 2022-01-24 PROCEDURE — 97165 OT EVAL LOW COMPLEX 30 MIN: CPT

## 2022-01-24 PROCEDURE — 80048 BASIC METABOLIC PNL TOTAL CA: CPT

## 2022-01-24 PROCEDURE — 1210000000 HC MED SURG R&B

## 2022-01-24 PROCEDURE — 85025 COMPLETE CBC W/AUTO DIFF WBC: CPT

## 2022-01-24 PROCEDURE — 97535 SELF CARE MNGMENT TRAINING: CPT

## 2022-01-24 PROCEDURE — 97530 THERAPEUTIC ACTIVITIES: CPT

## 2022-01-24 PROCEDURE — 36415 COLL VENOUS BLD VENIPUNCTURE: CPT

## 2022-01-24 PROCEDURE — 6360000002 HC RX W HCPCS: Performed by: NURSE PRACTITIONER

## 2022-01-24 PROCEDURE — 97162 PT EVAL MOD COMPLEX 30 MIN: CPT

## 2022-01-24 PROCEDURE — 2580000003 HC RX 258: Performed by: NURSE PRACTITIONER

## 2022-01-24 PROCEDURE — 82947 ASSAY GLUCOSE BLOOD QUANT: CPT

## 2022-01-24 PROCEDURE — 6370000000 HC RX 637 (ALT 250 FOR IP): Performed by: NURSE PRACTITIONER

## 2022-01-24 RX ORDER — HYDRALAZINE HYDROCHLORIDE 20 MG/ML
10 INJECTION INTRAMUSCULAR; INTRAVENOUS EVERY 6 HOURS PRN
Status: DISCONTINUED | OUTPATIENT
Start: 2022-01-24 | End: 2022-01-27 | Stop reason: HOSPADM

## 2022-01-24 RX ADMIN — HYDROCODONE BITARTRATE AND ACETAMINOPHEN 1 TABLET: 5; 325 TABLET ORAL at 20:17

## 2022-01-24 RX ADMIN — HEPARIN SODIUM 5000 UNITS: 5000 INJECTION INTRAVENOUS; SUBCUTANEOUS at 16:52

## 2022-01-24 RX ADMIN — SENNOSIDES 8.6 MG: 8.6 TABLET, COATED ORAL at 09:18

## 2022-01-24 RX ADMIN — LACTULOSE 15.33 G: 20 SOLUTION ORAL at 13:41

## 2022-01-24 RX ADMIN — LISINOPRIL 20 MG: 10 TABLET ORAL at 20:16

## 2022-01-24 RX ADMIN — SEVELAMER CARBONATE 800 MG: 800 TABLET, FILM COATED ORAL at 09:37

## 2022-01-24 RX ADMIN — HEPARIN SODIUM 5000 UNITS: 5000 INJECTION INTRAVENOUS; SUBCUTANEOUS at 02:08

## 2022-01-24 RX ADMIN — ISOSORBIDE MONONITRATE 120 MG: 60 TABLET, EXTENDED RELEASE ORAL at 09:18

## 2022-01-24 RX ADMIN — HYDRALAZINE HYDROCHLORIDE 100 MG: 25 TABLET, FILM COATED ORAL at 02:07

## 2022-01-24 RX ADMIN — INSULIN LISPRO 4 UNITS: 100 INJECTION, SOLUTION INTRAVENOUS; SUBCUTANEOUS at 09:37

## 2022-01-24 RX ADMIN — ROPINIROLE HYDROCHLORIDE 4 MG: 4 TABLET, FILM COATED ORAL at 20:16

## 2022-01-24 RX ADMIN — HYDRALAZINE HYDROCHLORIDE 100 MG: 25 TABLET, FILM COATED ORAL at 16:48

## 2022-01-24 RX ADMIN — LACTULOSE 15.33 G: 20 SOLUTION ORAL at 20:16

## 2022-01-24 RX ADMIN — METOPROLOL SUCCINATE 150 MG: 50 TABLET, EXTENDED RELEASE ORAL at 06:10

## 2022-01-24 RX ADMIN — ATORVASTATIN CALCIUM 40 MG: 40 TABLET, FILM COATED ORAL at 09:18

## 2022-01-24 RX ADMIN — LACTULOSE 15.33 G: 20 SOLUTION ORAL at 09:18

## 2022-01-24 RX ADMIN — SEVELAMER CARBONATE 800 MG: 800 TABLET, FILM COATED ORAL at 16:24

## 2022-01-24 RX ADMIN — NIFEDIPINE 90 MG: 90 TABLET, FILM COATED, EXTENDED RELEASE ORAL at 09:18

## 2022-01-24 RX ADMIN — SODIUM CHLORIDE, PRESERVATIVE FREE 10 ML: 5 INJECTION INTRAVENOUS at 20:17

## 2022-01-24 RX ADMIN — DULOXETINE 60 MG: 30 CAPSULE, DELAYED RELEASE ORAL at 09:18

## 2022-01-24 RX ADMIN — HYDROCODONE BITARTRATE AND ACETAMINOPHEN 1 TABLET: 5; 325 TABLET ORAL at 16:24

## 2022-01-24 RX ADMIN — INSULIN GLARGINE 11 UNITS: 100 INJECTION, SOLUTION SUBCUTANEOUS at 20:17

## 2022-01-24 RX ADMIN — SODIUM CHLORIDE, PRESERVATIVE FREE 10 ML: 5 INJECTION INTRAVENOUS at 09:29

## 2022-01-24 RX ADMIN — SEVELAMER CARBONATE 800 MG: 800 TABLET, FILM COATED ORAL at 11:32

## 2022-01-24 RX ADMIN — GUAIFENESIN 600 MG: 600 TABLET, EXTENDED RELEASE ORAL at 20:16

## 2022-01-24 RX ADMIN — GUAIFENESIN 600 MG: 600 TABLET, EXTENDED RELEASE ORAL at 09:18

## 2022-01-24 RX ADMIN — HYDROCODONE BITARTRATE AND ACETAMINOPHEN 1 TABLET: 5; 325 TABLET ORAL at 09:18

## 2022-01-24 RX ADMIN — LISINOPRIL 20 MG: 10 TABLET ORAL at 09:18

## 2022-01-24 RX ADMIN — HYDROCODONE BITARTRATE AND ACETAMINOPHEN 1 TABLET: 5; 325 TABLET ORAL at 02:07

## 2022-01-24 RX ADMIN — LEVOTHYROXINE SODIUM 150 MCG: 0.07 TABLET ORAL at 06:10

## 2022-01-24 RX ADMIN — HEPARIN SODIUM 5000 UNITS: 5000 INJECTION INTRAVENOUS; SUBCUTANEOUS at 09:23

## 2022-01-24 RX ADMIN — HYDRALAZINE HYDROCHLORIDE 100 MG: 25 TABLET, FILM COATED ORAL at 09:18

## 2022-01-24 ASSESSMENT — PAIN - FUNCTIONAL ASSESSMENT
PAIN_FUNCTIONAL_ASSESSMENT: PREVENTS OR INTERFERES SOME ACTIVE ACTIVITIES AND ADLS
PAIN_FUNCTIONAL_ASSESSMENT: PREVENTS OR INTERFERES SOME ACTIVE ACTIVITIES AND ADLS
PAIN_FUNCTIONAL_ASSESSMENT: PREVENTS OR INTERFERES WITH ALL ACTIVE AND SOME PASSIVE ACTIVITIES
PAIN_FUNCTIONAL_ASSESSMENT: PREVENTS OR INTERFERES SOME ACTIVE ACTIVITIES AND ADLS

## 2022-01-24 ASSESSMENT — ENCOUNTER SYMPTOMS
VOMITING: 0
WHEEZING: 0
BLOOD IN STOOL: 0
COUGH: 0
ABDOMINAL DISTENTION: 0
SINUS PAIN: 0
SHORTNESS OF BREATH: 0
TROUBLE SWALLOWING: 0
CONSTIPATION: 0
NAUSEA: 0
SORE THROAT: 0
ABDOMINAL PAIN: 0
DIARRHEA: 0

## 2022-01-24 ASSESSMENT — PAIN DESCRIPTION - DESCRIPTORS
DESCRIPTORS: DISCOMFORT
DESCRIPTORS: DISCOMFORT
DESCRIPTORS: SHARP

## 2022-01-24 ASSESSMENT — PAIN DESCRIPTION - PAIN TYPE
TYPE: ACUTE PAIN

## 2022-01-24 ASSESSMENT — PAIN DESCRIPTION - LOCATION
LOCATION: LEG
LOCATION: LEG;HIP
LOCATION: LEG
LOCATION: LEG;HIP
LOCATION: HIP;LEG

## 2022-01-24 ASSESSMENT — PAIN DESCRIPTION - FREQUENCY
FREQUENCY: CONTINUOUS

## 2022-01-24 ASSESSMENT — PAIN SCALES - GENERAL
PAINLEVEL_OUTOF10: 10
PAINLEVEL_OUTOF10: 7
PAINLEVEL_OUTOF10: 10
PAINLEVEL_OUTOF10: 3
PAINLEVEL_OUTOF10: 7
PAINLEVEL_OUTOF10: 10
PAINLEVEL_OUTOF10: 4
PAINLEVEL_OUTOF10: 9
PAINLEVEL_OUTOF10: 10
PAINLEVEL_OUTOF10: 5

## 2022-01-24 ASSESSMENT — PAIN DESCRIPTION - ORIENTATION
ORIENTATION: LEFT

## 2022-01-24 ASSESSMENT — PAIN DESCRIPTION - DIRECTION
RADIATING_TOWARDS: NO
RADIATING_TOWARDS: NO

## 2022-01-24 ASSESSMENT — PAIN DESCRIPTION - PROGRESSION
CLINICAL_PROGRESSION: NOT CHANGED
CLINICAL_PROGRESSION: NOT CHANGED

## 2022-01-24 ASSESSMENT — PAIN DESCRIPTION - ONSET
ONSET: ON-GOING
ONSET: ON-GOING

## 2022-01-24 NOTE — PROGRESS NOTES
Physical Therapy    Facility/Department: [facility] SURG SERVICES  Initial Assessment    NAME: Andi Real  : 1970  MRN: [id number]    Date of Service: 2022    Discharge Recommendations:  Continue to assess pending progress,24 hour supervision or assist,Patient would benefit from continued therapy after discharge        Assessment   Body structures, Functions, Activity limitations: Decreased functional mobility ; Decreased ROM; Decreased strength;Decreased cognition;Decreased sensation;Decreased posture; Increased pain;Decreased balance  Assessment: Pt. will benefit from cont. PT to decrease impairments. Pt. a fall risk and should not attempt mobility on her own at this time due to NWB status and pain. Pt. likely will need to work on transfers, as she will probably not be able to tolerate hopping on 1 leg for NWB. Pt. may be limited in participation for PT due to HD T,Th, Sat. RW left in room to use with pivot transfers. Treatment Diagnosis: impaired gait and mobility  Prognosis: Fair  Decision Making: Medium Complexity  PT Education: Goals;PT Role;Plan of Care;Transfer Training;Functional Mobility Training;Weight-bearing Education;General Safety;Gait Training;Precautions  Patient Education: KI, NWB status, A for mobility and transfers, OOBTC as much as possible  Barriers to Learning: pt reports poor memorty  REQUIRES PT FOLLOW UP: Yes  Activity Tolerance  Activity Tolerance: Patient limited by pain; Patient Tolerated treatment well;Patient limited by endurance; Patient limited by fatigue       Patient Diagnosis(es): The primary encounter diagnosis was Closed fracture of left femur, unspecified fracture morphology, unspecified portion of femur, initial encounter ([facility] [address] Diagnoses of Essential hypertension and Hyperglycemia were also pertinent to this visit.      has a past medical history of Arthritis, Chronic kidney disease, stage 5, kidney failure ([address] Closed fracture of right shoulder girdle, with routine healing, subsequent encounter, DM (diabetes mellitus) type II controlled with renal manifestation ([facility] [address] Gastroparesis, GERD (gastroesophageal reflux disease), Hemodialysis patient ([facility] [address] Hypertension, Hypothyroid, Nasal congestion, Neuropathy, Noncompliance with medications, Palliative care patient, and Restless leg syndrome. has a past surgical history that includes Tubal ligation; pr egd transoral biopsy single/multiple (N/A, 12/27/2016); Endometrial ablation (2012); pr colonoscopy w/biopsy single/multiple (N/A, 10/20/2017); pr egd transoral biopsy single/multiple (N/A, 10/20/2017); Dialysis fistula creation (Left, 1/25/2019); hc dialysis catheter (Right, 1/25/2019); Cholecystectomy, laparoscopic; liver biopsy (08/17/2018); vascular surgery (Left, 2016); vascular surgery (10/02/2017); vascular surgery (08/2018); vascular surgery (10/29/2018); vascular surgery (12/19/2018); vascular surgery (03/06/2019); vascular surgery (08/28/2019); vascular surgery (08/28/2019); Colonoscopy (Left, 9/17/2020); Breast surgery (Left, 2008); and Breast surgery (Right, 5/25/2021).     Restrictions  Restrictions/Precautions  Restrictions/Precautions: Fall Risk,Weight Bearing  Required Braces or Orthoses?: Yes  Lower Extremity Weight Bearing Restrictions  Left Lower Extremity Weight Bearing: Non Weight Bearing  Required Braces or Orthoses  Left Lower Extremity Brace: Knee Immobilizer  Position Activity Restriction  Other position/activity restrictions: TREAT NON-OP WITH IMMOBILZER AND NWB FOR UP TO 3 MTHS  Vision/Hearing  Vision: Impaired  Vision Exceptions: Wears glasses for reading  Hearing: Within functional limits     Subjective  General  Chart Reviewed: Yes  Patient assessed for rehabilitation services?: Yes  Response To Previous Treatment: Not applicable  Family / Caregiver Present: No  Referring Practitioner: ROMINA Hughes CNP  Referral Date : 01/23/22  Diagnosis: closed fx L femur, essential HTN, hyperglycemia  Follows Commands: Within Functional Limits  General Comment  Comments: RNDerick PT and notified pt wanted pain meds. Subjective  Subjective: Pt. willing to work with therapy. States they're trying to change her pain meds. Reports T,Th, Sat HD. Pain Screening  Patient Currently in Pain: Yes  Pain Assessment  Pain Assessment: 0-10  Pain Level: 10 (\"12\")  Pain Type: Acute pain  Pain Location: Leg;Hip  Pain Orientation: Left  Functional Pain Assessment: Prevents or interferes some active activities and ADLs  Non-Pharmaceutical Pain Intervention(s): Repositioned;Elevation; Ambulation/Increased Activity  Response to Pain Intervention: Patient Satisfied  Vital Signs  Patient Currently in Pain: Yes  Pre Treatment Pain Screening  Intervention List: Patient able to continue with treatment;Nurse called to administer meds;Nurse/physician notified    Orientation  Orientation  Overall Orientation Status: Within Normal Limits  Social/Functional History  Social/Functional History  Lives With: Significant other  Type of Home: House  Home Layout: One level  Home Access: Stairs to enter with rails  Entrance Stairs - Number of Steps: 4  Entrance Stairs - Rails: Both  Bathroom Shower/Tub: Tub/Shower unit  Bathroom Equipment: Shower chair  Home Equipment: [id number] wheeled walker,Cane  ADL Assistance: Independent  Ambulation Assistance: Independent (short distances only)  Transfer Assistance: Independent  Active : No  Additional Comments: HD T, Th, Sat-public transportation  Cognition   Cognition  Overall Cognitive Status: Exceptions  Following Commands:  Follows all commands without difficulty  Attention Span: Appears intact  Memory:  (pt reports poor memory)  Safety Judgement: Good awareness of safety precautions  Problem Solving: Assistance required to generate solutions;Assistance required to implement solutions  Initiation: Requires cues for some  Sequencing: Requires cues for some    Objective     Observation/Palpation  Posture: Fair  Observation: slight posterior lean, KI on LLE    AROM RLE (degrees)  RLE AROM: WFL  AROM LLE (degrees)  LLE AROM : Exceptions  LLE General AROM: ankle to neutral DF, knee NT due to KI, hip flexes to abotu 80 in sitting  Strength RLE  Strength RLE: WFL  Comment: grossly 4/5  Strength LLE  Strength LLE: Exception  Comment: grossly 3-/5 ankle, 1/5 knee and hip     Sensation  Overall Sensation Status: Impaired (neuropathy hands and feet)  Bed mobility  Supine to Sit: Maximum assistance;2 Person assistance  Scooting: Maximal assistance (difficulty scooting to EOB)  Comment: pt sat on EOB x 5 mins Min A for LLE  Transfers  Sit to Stand: Moderate Assistance;2 Person Assistance  Stand to sit: Moderate Assistance;2 Person Assistance  Bed to Chair: Moderate assistance;2 Person Assistance  Stand Pivot Transfers: 2 Person Assistance; Moderate Assistance  Comment: used rail of bed for pivot to chair  Ambulation  Ambulation?: No  WB Status: NWB LLE, KI     Balance  Posture: Fair  Sitting - Static: Fair;+  Sitting - Dynamic: Fair  Standing - Static: Poor  Standing - Dynamic: Poor        Plan   Plan  Times per week: 3-7  Times per day: Daily  Plan weeks: 2  Current Treatment Recommendations: Strengthening,ROM,Balance Training,Functional Mobility Training,Transfer Training,Endurance Training,Gait Training,Safety Education & Training,Positioning,Equipment Evaluation, Education, & procurement,Patient/Caregiver Education & Training  Plan Comment: cont. PT per POC.   Safety Devices  Type of devices: Gait belt,Patient at risk for falls,Nurse notified,Call light within reach,Left in chair,Chair alarm in place (reclined and breakfast set up)    G-Code       OutComes Score                                                  AM-PAC Score             Goals  Short term goals  Time Frame for Short term goals: 2 wks  Short term goal 1: supine to sit indep  Short term goal 2: sit to stand

## 2022-01-24 NOTE — PROGRESS NOTES
[facility]      Progress Note    Patient:  Lashonda Malhotra  YOB: 1970  Date of Service: 1/24/2022  MRN: [id number]   Acct: [de-identified]   Primary Care Physician: ROMINA Watts  Advance Directive: Full Code  Admit Date: 1/22/2022       Hospital Day: 2    Portions of this note have been copied forward, however, updated to reflect the most current clinical status of this patient. CHIEF COMPLAINT Left Leg pain after a fall at home     SUBJECTIVE:  Ms. Daniel Larkin was resting in bed this morning. Reported pain to Left knee and hip. Denies SOB or chest pain at this time. CUMULATIVE HOSPITAL COURSE:   The patient is a 49 y. o. female with PMH of ESRD on HD, DM, HTN, restless leg syndrome, and arthritis who presented [facility] ED complaining of left leg pain after a fall at home. Ms. Daniel Larkin reported she was getting ready for dialysis on day of admission when she fell. Reported again \"a pop\" in her left knee when she fell. Denied head injury or LOC. Work-up in ED revealed acute nondisplaced fracture through the distal femoral diaphysis of the left femur. Chest x-ray indicated cardiomegaly and interstitial coarsening, concerning for fluid overload, right infrahilar opacity, may reflect atelectasis, aspiration, infection. BG greater than 500, SBP over 200. BP and Glucose stabilized in ED. Patient was admitted to hospital medicine for closed fracture of the left distal femur with orthopedic surgery consultation, hypoglycemia, and hypertensive urgency. Home BP medications resumed, BP now within acceptable limits. Resumed home Lantus and prandial insulin, glucose now stabilized. Patient was evaluated by orthopedic surgery whom recommended nonsurgical management at this time with knee immobilizer in place and nonweightbearing on the LLE for 12 weeks. Case management assisting with SNF placement. Review of Systems   Constitutional: Negative for chills, diaphoresis, fatigue and fever.    HENT: Negative for congestion, ear pain, sinus pain, sore throat and trouble swallowing. Eyes: Negative for visual disturbance. Respiratory: Negative for cough, shortness of breath and wheezing. Cardiovascular: Negative for chest pain, palpitations and leg swelling. Gastrointestinal: Negative for abdominal distention, abdominal pain, blood in stool, constipation, diarrhea, nausea and vomiting. Endocrine: Negative for cold intolerance and heat intolerance. Genitourinary: Negative for difficulty urinating, flank pain, frequency and urgency. Musculoskeletal: Positive for arthralgias (Left hip and knee pain ). Negative for myalgias. Neurological: Negative for dizziness, syncope, weakness, light-headedness, numbness and headaches. Hematological: Does not bruise/bleed easily. Psychiatric/Behavioral: Negative for agitation, confusion and dysphoric mood. Objective:   VITALS:  BP (!) 168/69   Pulse 71   Temp 98.1 °F (36.7 °C) (Temporal)   Resp 17   Ht 5' 6\" (1.676 m)   Wt 150 lb 8 oz (68.3 kg)   SpO2 94%   BMI 24.29 kg/m²   24HR INTAKE/OUTPUT:    Intake/Output Summary (Last 24 hours) at 1/24/2022 1238  Last data filed at 1/24/2022 0800  Gross per 24 hour   Intake 605 ml   Output    Net 605 ml           Physical Exam  Constitutional:       General: She is not in acute distress. Appearance: Normal appearance. She is not toxic-appearing or diaphoretic. HENT:      Head: Normocephalic and atraumatic. Right Ear: External ear normal.      Left Ear: External ear normal.      Nose: Nose normal. No congestion or rhinorrhea. Mouth/Throat:      Mouth: Mucous membranes are moist.      Pharynx: Oropharynx is clear. Eyes:      General: No scleral icterus. Extraocular Movements: Extraocular movements intact. Conjunctiva/sclera: Conjunctivae normal.   Cardiovascular:      Rate and Rhythm: Normal rate and regular rhythm. Pulses: Normal pulses.       Heart sounds: Normal heart sounds. No murmur heard. No friction rub. No gallop. Pulmonary:      Effort: Pulmonary effort is normal. No respiratory distress. Breath sounds: Normal breath sounds. No wheezing, rhonchi or rales. Abdominal:      General: Abdomen is flat. Bowel sounds are normal. There is no distension. Palpations: Abdomen is soft. Tenderness: There is abdominal tenderness. Musculoskeletal:         General: No swelling. Normal range of motion. Cervical back: Normal range of motion and neck supple. Right lower leg: No edema. Left lower leg: No edema. Comments: Knee immobilizer in place to LLE     Skin:     General: Skin is warm and dry. Coloration: Skin is not jaundiced. Findings: No erythema, lesion or rash. Neurological:      General: No focal deficit present. Mental Status: She is alert and oriented to person, place, and time. Mental status is at baseline. Cranial Nerves: No cranial nerve deficit. Sensory: No sensory deficit. Motor: No weakness. Psychiatric:         Mood and Affect: Mood normal.         Behavior: Behavior normal.         Thought Content:  Thought content normal.         Judgment: Judgment normal.            Medications:      dextrose      sodium chloride        cloNIDine  1 patch TransDERmal Weekly    DULoxetine  60 mg Oral Daily    vitamin D3  400 Units Oral Q14 Days    hydrALAZINE  100 mg Oral 3 times per day    isosorbide mononitrate  120 mg Oral Daily    lactulose  15.3333 g Oral TID    levothyroxine  150 mcg Oral Daily    lisinopril  20 mg Oral BID    metoprolol succinate  150 mg Oral QAM AC    nicotine  1 patch TransDERmal Daily    rOPINIRole  4 mg Oral Nightly    senna  1 tablet Oral Daily    sevelamer  800 mg Oral TID WC    atorvastatin  40 mg Oral Daily    sodium chloride flush  5-40 mL IntraVENous 2 times per day    heparin (porcine)  5,000 Units SubCUTAneous 3 times per day    guaiFENesin  600 mg Oral BID    insulin glargine  0.15 Units/kg SubCUTAneous Nightly    insulin lispro  0.05 Units/kg SubCUTAneous TID WC    insulin lispro  0-18 Units SubCUTAneous TID WC    insulin lispro  0-9 Units SubCUTAneous Nightly    NIFEdipine  90 mg Oral Daily     ipratropium, HYDROcodone-acetaminophen, glucose, dextrose, glucagon (rDNA), dextrose, sodium chloride flush, sodium chloride, ondansetron **OR** ondansetron, polyethylene glycol, acetaminophen **OR** acetaminophen, aluminum & magnesium hydroxide-simethicone, albuterol  ADULT DIET; Easy to Chew; 4 carb choices (60 gm/meal); Low Fat/Low Chol/High Fiber/LUIS; Low Sodium (2 gm); Low Potassium (Less than 3000 mg/day); Low Phosphorus (Less than 1000 mg); 60 to 80 gm     Lab and other Data:     Recent Labs     01/22/22  0635 01/23/22  0537 01/24/22  0456   WBC 6.6 6.0 6.5   HGB 8.2* 7.8* 7.7*   * 91* 95*     Recent Labs     01/22/22  0635 01/23/22  0537 01/24/22  0456   * 140 132*   K 3.3* 3.6 4.1   CL 88* 98 91*   CO2 29 26 25   BUN 35* 26* 44*   CREATININE 3.7* 3.5* 5.0*   GLUCOSE 562* 185* 129*     Recent Labs     01/22/22  0635   AST 14   ALT 18   BILITOT 0.3   ALKPHOS 143*     A1C:   Recent Labs     01/22/22  0635   LABA1C 10.8*       RAD:     XR PELVIS (1-2 VIEWS)  Result Date: 1/22/2022    Impression: No acute osseous pathology. Signed by Dr Naomi Dominguez      XR FEMUR LEFT (MIN 2 VIEWS)  Result Date: 1/22/2022    Impression: Acute nondisplaced fracture through the distal femoral diaphysis. Signed by Dr Naomi Dominguez      XR KNEE LEFT (1-2 VIEWS)  Result Date: 1/22/2022    Impression: Acute nondisplaced fracture through the distal femoral diaphysis. Signed by Dr Naomi Dominguez      XR FOOT LEFT (2 VIEWS)  Result Date: 1/22/2022    Impression: No acute osseous pathology. Signed by Dr Naomi Dominguez      XR CHEST PORTABLE  Result Date: 1/22/2022    Impression: Cardiomegaly and interstitial coarsening, concerning for fluid overload.  Right infrahilar opacity, may reflect atelectasis, aspiration or infection. Signed by Dr Annita Ghotra:    [id number], Chasidy Booth [[id number]] Collected: 01/22/22 0627   Order Status: Completed Specimen: Nasopharyngeal Swab Updated: 01/22/22 0650    SARS-CoV-2, NAAT Not Detected     Culture, Blood 2 [[id number]] Collected: 01/22/22 1458   Order Status: Completed Specimen: Blood Updated: 01/23/22 1714    Culture, Blood 2 No Growth to date.  Any change in status will be called. Assessment/Plan   Principal Problem:    Closed fracture of left distal femur (HCC)  Active Problems:    Anemia in chronic kidney disease (CKD)    Noncompliance of patient with renal dialysis ([address]    Uncontrolled type 2 diabetes mellitus with complication, with long-term current use of insulin ([address]    ESRD (end stage renal disease) on dialysis ([address]    Hyperglycemia due to type 2 diabetes mellitus ([address]    Uncontrolled hypertension    Medically noncompliant    Closed fracture of distal end of left femur, initial encounter ([facility]  Resolved Problems:    * No resolved hospital problems.  *      Principal Problem:    Closed fracture of left distal femur ([address]-    - Assessed by Orthopedic surgery with recommendations of non-surgical management at this time    - PT/OT per ortho recommendations    - Case management assisting with SNF placement    - Knee immobilizer in place         Active Problems:    Anemia in chronic kidney disease (CKD)-    - monitor labs closely       Noncompliance of patient with renal dialysis ([address]- noted      Hyperglycemia due to type 2 diabetes mellitus (HCC)/ Uncontrolled type 2 diabetes mellitus with complication, with long-term current use of insulin ([address]- continue Lantus, SSI, Accuchecks, hypoglycemia treatment protocol in place       ESRD (end stage renal disease) on dialysis [facility])- nephrology managing HD      Uncontrolled hypertension- elevated at times, continue current medications, as needed hydralazine      Medically noncompliant- noted              DVT Prophylaxis: Heparin SubQ    Discharge planning: Case management working on placement      Further Orders per Clinical course/attending. Electronically signed by ROMINA Saez CNP on 1/24/2022 at 12:38 PM       EMR Dragon/Transcription disclaimer:   Much of this encounter note is an electronic transcription/translation of spoken language to printed text.  The electronic translation of spoken language may permit erroneous, or at times, nonsensical words or phrases to be inadvertently transcribed; although attempts have made to review the note for such errors, some may still exist.

## 2022-01-24 NOTE — PROGRESS NOTES
Occupational Therapy Initial Assessment  Date: 2022   Patient Name: Kimberly Hernandez  MRN: [id number]     : 1970    Date of Service: 2022    Discharge Recommendations:  24 hour supervision or assist,Patient would benefit from continued therapy after discharge. Assessment   Assessment: OT evaluation completed and tx initiated. Pt may benefit from continued skilled services to address functional deficits. Pt will likely progress prolonged tx. Currently pt is at risk for falls/fall-related injuries. Therefore, OT recommends D/C location to have 24/ supervision/assist. Per chart review, medical practitioner suggests need for SNF. Due to pt's stairs/general environmental barriers, OT agrees. OT Education: Precautions; Equipment;ADL Adaptive Strategies;Transfer Training  REQUIRES OT FOLLOW UP: Yes  Activity Tolerance  Activity Tolerance: Patient Tolerated treatment well  Activity Tolerance: Despite severe pain pt did participate; extra time to manage  Safety Devices  Safety Devices in place: Yes  Type of devices: Call light within reach; Chair alarm in place; Left in chair;Nurse notified           Patient Diagnosis(es): The primary encounter diagnosis was Closed fracture of left femur, unspecified fracture morphology, unspecified portion of femur, initial encounter ([facility] Diagnoses of Essential hypertension and Hyperglycemia were also pertinent to this visit. has a past medical history of Arthritis, Chronic kidney disease, stage 5, kidney failure ([address] Closed fracture of right shoulder girdle, with routine healing, subsequent encounter, DM (diabetes mellitus) type II controlled with renal manifestation ([facility] Gastroparesis, GERD (gastroesophageal reflux disease), Hemodialysis patient ([facility] [facility] Hypertension, Hypothyroid, Nasal congestion, Neuropathy, Noncompliance with medications, Palliative care patient, and Restless leg syndrome.    has a past surgical history that includes Tubal ligation; pr egd Screening  Pain at present: 6 (\"6-7\")  Scale Used: Numeric Score  Intervention List: Patient able to continue with treatment  Vital Signs  Temp: 98.1 °F (36.7 °C)  Temp Source: Temporal  Pulse: 71  Heart Rate Source: Monitor  Resp: 17  BP: (!) 168/69  BP Location: Right upper arm  MAP (mmHg): 94  Patient Position: Sitting  Level of Consciousness: Alert (0)  Patient Currently in Pain: Yes  Oxygen Therapy  SpO2: 94 %  O2 Device: None (Room air)    Social/Functional History  Social/Functional History  Lives With: Significant other  Type of Home: House  Home Layout: One level  Home Access: Stairs to enter with rails  Entrance Stairs - Number of Steps: 4  Entrance Stairs - Rails: Both  Bathroom Shower/Tub: Tub/Shower unit  Bathroom Equipment: Shower chair  Bathroom Accessibility: Not accessible (fall occurred during transition out of w/c and into BR)  Home Equipment: [employer] walker,4 wheeled walker,Cane (utilized manual w/c in house mainly; cane for stairs; rollator otherwise)  ADL Assistance: Independent  Ambulation Assistance: Independent (short distances only)  Transfer Assistance: Independent  Active : No  Additional Comments: HD T, Th, Sat-public transportation       Objective   Vision: Impaired  Vision Exceptions: Wears glasses for reading  Hearing: Within functional limits       Observation/Palpation  Posture: Fair  Observation: slight posterior lean, KI on LLE  Balance  Sitting Balance: Stand by assistance  Standing Balance: Contact guard assistance  Functional Mobility  Functional Mobility Comments: \"true\" functional distance not attempted d/t NWB status (and pain); few steps for trns  Toilet Transfers  Toilet - Technique: Stand pivot  Equipment Used: Standard bedside commode  Toilet Transfer:  Moderate assistance;2 Person assistance  Toilet Transfers Comments: suggesting r/w if set-up is not conducive (handrails close)           Bed mobility  Supine to Sit: Maximum assistance;2 Person assistance  Scooting: Maximal assistance (difficulty scooting to EOB)  Comment: extra time for pain  Transfers  Stand Pivot Transfers: Moderate assistance;2 Person assistance  Sit to stand: Minimal assistance;2 Person assistance  Stand to sit: Minimal assistance;2 Person assistance  Transfer Comments: 2nd assistant used mainly to observe/protect LLE; used r/w     Cognition  Overall Cognitive Status: Exceptions  Following Commands: Follows all commands without difficulty  Attention Span: Appears intact  Memory:  (pt reports poor memory)  Safety Judgement: Good awareness of safety precautions  Problem Solving: Assistance required to generate solutions;Assistance required to implement solutions  Initiation: Requires cues for some  Sequencing: Requires cues for some        Sensation  Overall Sensation Status: Impaired (neuropathy hands and feet)        LUE AROM (degrees)  LUE AROM : WFL  RUE AROM (degrees)  RUE AROM : WFL  LUE Strength  Gross LUE Strength: WFL  L Hand General: 4+/5  RUE Strength  Gross RUE Strength: WFL  R Hand General: 4+/5              Included Treatment  Tx consisted of: bed mobility; pt education; transfer training; DME training; activity tolerance/balance challenges; positioning with KI use. (Treatment time: 15 min)        Plan   Plan  Times per week: 3-5  Specific instructions for Next Treatment: transfers with PT; standing ADLs  Current Treatment Recommendations: Strengthening,Wheelchair Mobility Training,Positioning,Pain Management,ROM,Safety Education & Training,Balance Training,Patient/Caregiver Education & Training,Self-Care / ADL,Cognitive/Perceptual Training,Functional Mobility Training,Equipment Evaluation, Education, & procurement,Home Management Training,Endurance Training    Goals  Short term goals  Time Frame for Short term goals: 1 week  Short term goal 1: Complete transfers with CGA and DME.   Short term goal 2: Perform static, standing ADL with unilateral support and CGA for 5 min.  Short term goal 3: Complete LBD with min A and AE/DME. Short term goal 4: Complete toileting skills with CGA and DME. Short term goal 5: Pt/family will verbalize/demo: AE/DME options; LLE NWB precautions; knee immobilizer use; recommended therapeutic activities; homemaking/IADL strategies; energy conservation techniques; and fall prevention strategies. Long term goals  Long term goal 1: Progress as tolerated.                Catia Atkins OTR/L  Electronically signed by Catia Atkins OTR/L on 1/24/2022 at 10:27 AM.

## 2022-01-24 NOTE — CARE COORDINATION
SW attempted to see Pt this morning on multiple occasions but Pt has been sleeping soundly. Pt asked for her nurse to please let SW know when she is more awake to discuss DC planning options. Pt will most likely need SNF placement. Awaiting follow up at this time.    Electronically signed by Ema Park on 1/24/2022 at 12:25 PM

## 2022-01-24 NOTE — PROGRESS NOTES
Nephrology ([facility]) progress Note      Patient:  Yulissa Mcdonald  YOB: 1970  Date of Service: 1/24/2022  MRN: [id number]   Acct: [de-identified]   Primary Care Physician: ROMINA Bright  Advance Directive: Full Code  Admit Date: 1/22/2022       Hospital Day: 2  Referring Provider: Roya Friedman MD    Patient independently seen and examined, Chart, Consults, Notes, Operative notes, Labs, Cardiology, and Radiology studies reviewed as available. Subjective:  Yulissa Mcdonald is a 47 y.o. female for whom we were consulted for evaluation and treatment of end-stage renal disease. Patient had a fall at home from slipping in her bathroom and fell and broke her hip. She is complaining of pain at the site. Recalled no recent issues with dialysis. Denies chest pain, shortness of air, nausea or vomiting. Denies loss of consciousness. She noted no operative plans for fracture management. Today, patient offers no new complaints.       Allergies:  Penicillins, Lamisil advanced [terbinafine], Reglan [metoclopramide], and Stadol [butorphanol]    Medicines:  Current Facility-Administered Medications   Medication Dose Route Frequency Provider Last Rate Last Admin    hydrALAZINE (APRESOLINE) injection 10 mg  10 mg IntraVENous Q6H PRN Merline Comp, APRN - CNP        ipratropium (ATROVENT) 0.02 % nebulizer solution 0.5 mg  0.5 mg Nebulization 4x Daily PRN ROMINA Valdez CNP        cloNIDine (CATAPRES) 0.1 MG/24HR 1 patch  1 patch TransDERmal Weekly ROMINA Valdez CNP        DULoxetine (CYMBALTA) extended release capsule 60 mg  60 mg Oral Daily ROMINA Valdez CNP   60 mg at 01/24/22 0918    vitamin D3 (CHOLECALCIFEROL) tablet 400 Units  400 Units Oral Q14 Days ROMINA Valdez CNP   400 Units at 01/22/22 1646    hydrALAZINE (APRESOLINE) tablet 100 mg  100 mg Oral 3 times per day ROMINA Valdez CNP   100 mg at 01/24/22 6797    HYDROcodone-acetaminophen (NORCO) 5-325 MG per tablet 1 tablet  1 tablet Oral Q4H PRN Jettie Bold, APRN - CNP   1 tablet at 01/24/22 0918    isosorbide mononitrate (IMDUR) extended release tablet 120 mg  120 mg Oral Daily Jettie Bold, APRN - CNP   120 mg at 01/24/22 0918    lactulose (CHRONULAC) 10 GM/15ML solution 15.3333 g  15.3333 g Oral TID Jettie Bold, APRN - CNP   15.3333 g at 01/24/22 1341    levothyroxine (SYNTHROID) tablet 150 mcg  150 mcg Oral Daily Jettie Bold, APRN - CNP   150 mcg at 01/24/22 0610    lisinopril (PRINIVIL;ZESTRIL) tablet 20 mg  20 mg Oral BID Jettie Bold, APRN - CNP   20 mg at 01/24/22 2306    metoprolol succinate (TOPROL XL) extended release tablet 150 mg  150 mg Oral QAM AC Jettie Bold, APRN - CNP   150 mg at 01/24/22 0610    nicotine (NICODERM CQ) 14 MG/24HR 1 patch  1 patch TransDERmal Daily Jettie Bold, APRN - CNP   1 patch at 01/24/22 0918    rOPINIRole (REQUIP) tablet 4 mg  4 mg Oral Nightly Jettie Bold, APRN - CNP   4 mg at 01/23/22 2114    senna (SENOKOT) tablet 8.6 mg  1 tablet Oral Daily Jettie Bold, APRN - CNP   8.6 mg at 01/24/22 0918    sevelamer (RENVELA) tablet 800 mg  800 mg Oral TID WC Jettie Bold, APRN - CNP   800 mg at 01/24/22 1132    atorvastatin (LIPITOR) tablet 40 mg  40 mg Oral Daily Jettie Bold, APRN - CNP   40 mg at 01/24/22 0918    glucose (GLUTOSE) 40 % oral gel 15 g  15 g Oral PRN Jettie Bold, APRN - CNP        dextrose 50 % IV solution  12.5 g IntraVENous PRN Jettie Bold, APRN - CNP        glucagon (rDNA) injection 1 mg  1 mg IntraMUSCular PRN Jettie Bold, APRN - CNP        dextrose 5 % solution  100 mL/hr IntraVENous PRN Jettie Bold, APRN - CNP        sodium chloride flush 0.9 % injection 5-40 mL  5-40 mL IntraVENous 2 times per day ROMINA Franco - CNP   10 mL at 01/24/22 2403    sodium chloride flush 0.9 % injection Past Medical History:  Past Medical History:   Diagnosis Date    Arthritis     Chronic kidney disease, stage 5, kidney failure (HCC)     Closed fracture of right shoulder girdle, with routine healing, subsequent encounter     DM (diabetes mellitus) type II controlled with renal manifestation ([facility] [address] 06/1993    Gastroparesis     GERD (gastroesophageal reflux disease)     Hemodialysis patient ([facility] [address]     dialysis on tues, thur, and sat at [facility]    Hypertension     Hypothyroid     Nasal congestion     recent    Neuropathy     Noncompliance with medications     Palliative care patient 10/08/2019    Restless leg syndrome        Past Surgical History:  Past Surgical History:   Procedure Laterality Date    BREAST SURGERY Left 2008    infected milk gland removed    BREAST SURGERY Right 5/25/2021    WEDGE EXCISION RIGHT BREAST DUCT WITH LACRIMAL DUCT PROBE, PEC BLOCK performed by Vida Kurtz MD at [facility]      2008    COLONOSCOPY Left 9/17/2020    Dr Amando Richards hepatic flexure-Severe tortuosity with severe spasming, 1 yr recall    DIALYSIS FISTULA CREATION Left 1/25/2019    REVISION LEFT UPPER EXTREMITY AV ACCESS WITH LEFT BRACHIAL ARTERY TO LEFT AXILLARY VEIN WITH  INTERPOSITIONAL ARTEGRAFT   performed by Elly Whipple MD at [address]  2012    [address] Right 1/25/2019    INSERTION OF RIGHT INTERNAL JUGULAR HEMODIALYSIS CATHETER performed by Elly Whipple MD at [facility]  08/17/2018    1.  Moderately increased stainable iron identified by special stain in Kupffer cells and occasional hepatocytes. Negative for evidence of significant portal or lobular inflammation.  Negative for evidence of significant fibrosis    [place] COLONOSCOPY W/BIOPSY SINGLE/MULTIPLE N/A 10/20/2017    Dr Adama Starkey prep-Tubular AP (-) dysplasia x 2--3 yr recall    [place] EGD TRANSORAL BIOPSY SINGLE/MULTIPLE N/A 12/27/2016     Peter-normal    [place] EGD TRANSORAL BIOPSY SINGLE/MULTIPLE N/A 10/20/2017    Dr Ruthann Wu (-)    TUBAL LIGATION          VASCULAR SURGERY Left 2016    fistula by Dr Lucas  at [address]  10/02/2017    [facility]. Left upper fistulograms/venograms, balloon angioplasty cephalic vein arch [id number] conquest.    VASCULAR SURGERY  2018    FISTULOGRAM    VASCULAR SURGERY  10/29/2018    [facility]. Left upper fistulograms/venograms    VASCULAR SURGERY  2018    [facility]. Arch aortogram,left upper arteriograms.  VASCULAR SURGERY  2019    [facility]. Removal of tunneled dialyis catheter right internal jugular vein.  VASCULAR SURGERY  2019    [facility]. Left upper extremity fistulogram including venography to the superior vena cava. Balloon angioplasty left subclavian vein with 10mm x 40mm conquest balloon. Balloon angioplasty mid/proximal upper arm cephalic vein with 65UW x 40mm conquest balloon. Venograms after balloon angioplasty. Stent left mid/proximal upper arm cephalic vein stenosis fluency 10mm x 60mm self-expanding covered stent. Balloon     VASCULAR SURGERY  2019    cont angioplasty stent with 10mm x 40mm conquest balloon. Completion venograms left upper extremity.        Family History  Family History   Problem Relation Age of Onset    Colon Cancer Mother          at 63    Colon Polyps Mother     Lung Cancer Father          at 79    Colon Polyps Father     Stomach Cancer Sister     Breast Cancer Sister 27    Depression Daughter 25        committed suicide    Liver Cancer Neg Hx     Liver Disease Neg Hx     Esophageal Cancer Neg Hx     Rectal Cancer Neg Hx        Social History  Social History     Socioeconomic History    Marital status: Single     Spouse name: Not on file    Number of children: 2    Years of education: Not on file    Highest education level: Not on file   Occupational History    Not on file   Tobacco Use    Smoking status: Current Every Day Smoker     Packs/day: 0.50     Years: 33.00     Pack years: 16.50     Types: Cigarettes    Smokeless tobacco: Never Used    Tobacco comment: NOW at 1/4 PD, started at age 24 and has averaged 2 PPD   Vaping Use    Vaping Use: Never used   Substance and Sexual Activity    Alcohol use: No    Drug use: Not Currently     Types: Marijuana Julia Peralta)    Sexual activity: Not Currently     Partners: Male   Other Topics Concern    Not on file   Social History Narrative    Not on file     Social Determinants of Health     Financial Resource Strain:     Difficulty of Paying Living Expenses: Not on file   Food Insecurity:     Worried About Running Out of Food in the Last Year: Not on file    Kendy of Food in the Last Year: Not on file   Transportation Needs:     Lack of Transportation (Medical): Not on file    Lack of Transportation (Non-Medical):  Not on file   Physical Activity:     Days of Exercise per Week: Not on file    Minutes of Exercise per Session: Not on file   Stress:     Feeling of Stress : Not on file   Social Connections:     Frequency of Communication with Friends and Family: Not on file    Frequency of Social Gatherings with Friends and Family: Not on file    Attends Rastafarian Services: Not on file    Active Member of [address] [place] [address] [employer] or Organizations: Not on file    Attends Club or Organization Meetings: Not on file    Marital Status: Not on file   Intimate Partner Violence:     Fear of Current or Ex-Partner: Not on file    Emotionally Abused: Not on file    Physically Abused: Not on file    Sexually Abused: Not on file   Housing Stability:     Unable to Pay for Housing in the Last Year: Not on file    Number of [place] in the Last Year: Not on file    Unstable Housing in the Last Year: Not on file         Review of Systems:  History obtained from chart review and the patient  General ROS: No fever or chills  Respiratory ROS: No cough, shortness of breath, wheezing  Cardiovascular ROS: No chest pain or palpitations  Gastrointestinal ROS: No abdominal pain or melena  Genito-Urinary ROS: No dysuria or hematuria  Musculoskeletal ROS: Complaining hip pain  14 point ROS reviewed with the patient and negative except as noted above and in the HPI unless unable to obtain. Objective:  Patient Vitals for the past 24 hrs:   BP Temp Temp src Pulse Resp SpO2 Weight   01/24/22 0906 (!) 168/69 98.1 °F (36.7 °C) Temporal 71 17 94 %    01/24/22 0216 (!) 143/68 97.3 °F (36.3 °C) Temporal 70 16 96 % 150 lb 8 oz (68.3 kg)   01/23/22 1952 129/62 98.4 °F (36.9 °C) Temporal 73 16 93 %    01/23/22 1637 127/68 99.5 °F (37.5 °C) Temporal 72  95 %        Intake/Output Summary (Last 24 hours) at 1/24/2022 1350  Last data filed at 1/24/2022 0800  Gross per 24 hour   Intake 605 ml   Output    Net 605 ml     General: awake/alert   Chest:  clear to auscultation bilaterally  CVS: regular rate and rhythm  Abdominal: soft, nontender, normal bowel sounds  Extremities: no cyanosis or edema  Skin: warm and dry without rash      Labs:  BMP:   Recent Labs     01/22/22  0635 01/23/22  0537 01/24/22  0456   * 140 132*   K 3.3* 3.6 4.1   CL 88* 98 91*   CO2 29 26 25   BUN 35* 26* 44*   CREATININE 3.7* 3.5* 5.0*   CALCIUM 9.6 9.0 8.7     CBC:   Recent Labs     01/22/22  0635 01/23/22  0537 01/24/22  0456   WBC 6.6 6.0 6.5   HGB 8.2* 7.8* 7.7*   HCT 26.6* 26.4* 25.5*   MCV 98.9 100.0* 99.6*   * 91* 95*     LIVER PROFILE:   Recent Labs     01/22/22 0635   AST 14   ALT 18   BILITOT 0.3   ALKPHOS 143*     PT/INR: No results for input(s): PROTIME, INR in the last 72 hours. APTT: No results for input(s): APTT in the last 72 hours. BNP:  No results for input(s): BNP in the last 72 hours. Ionized Calcium:No results for input(s): IONCA in the last 72 hours. Magnesium:No results for input(s): MG in the last 72 hours. Phosphorus:No results for input(s): PHOS in the last 72 hours.   HgbA1C:   Recent Labs     01/22/22  0635   LABA1C 10.8* Hepatic:   Recent Labs     01/22/22  0635   ALKPHOS 143*   ALT 18   AST 14   PROT 7.1   BILITOT 0.3   LABALBU 4.2     Lactic Acid: No results for input(s): LACTA in the last 72 hours. Troponin: No results for input(s): CKTOTAL, CKMB, TROPONINT in the last 72 hours. ABGs: No results for input(s): PH, PCO2, PO2, HCO3, O2SAT in the last 72 hours. CRP:  No results for input(s): CRP in the last 72 hours. Sed Rate:  No results for input(s): SEDRATE in the last 72 hours. Cultures:   No results for input(s): CULTURE in the last 72 hours. Recent Labs     01/22/22  1458   BLOODCULT2 No Growth to date. Any change in status will be called. No results for input(s): CXSURG in the last 72 hours. Radiology reports as per the Radiologist  Radiology: XR PELVIS (1-2 VIEWS)    Result Date: 1/22/2022  Exam:   XR PELVIS (1-2 VIEWS)  Date:  1/22/2022 History:  Female, age  47 years; fall COMPARISON:  None. Findings : There is no acute displaced fracture. No dislocation. No suspicious lytic or blastic lesion. No radiopaque foreign body. Impression: No acute osseous pathology. Signed by Dr Shawn Elizabeth    XR FEMUR LEFT (MIN 2 VIEWS)    Result Date: 1/22/2022  Exam:   XR FEMUR LEFT (MIN 2 VIEWS), XR KNEE LEFT (1-2 VIEWS)  Date:  1/22/2022 History:  Female, age  47 years; fall COMPARISON:  None. Findings : Demineralized skeleton limits bony details. Linear lucency in the distal femur extending through the posterior cortex, involving the distal diaphysis, is consistent with an acute nondisplaced fracture. There is no dislocation. Impression: Acute nondisplaced fracture through the distal femoral diaphysis. Signed by Dr Shawn Elizabeth    XR KNEE LEFT (1-2 VIEWS)    Result Date: 1/22/2022  Exam:   XR FEMUR LEFT (MIN 2 VIEWS), XR KNEE LEFT (1-2 VIEWS)  Date:  1/22/2022 History:  Female, age  47 years; fall COMPARISON:  None. Findings : Demineralized skeleton limits bony details.  Linear lucency in the distal femur extending through the posterior cortex, involving the distal diaphysis, is consistent with an acute nondisplaced fracture. There is no dislocation. Impression: Acute nondisplaced fracture through the distal femoral diaphysis. Signed by Dr Heena Crain    XR FOOT LEFT (2 VIEWS)    Result Date: 1/22/2022  Exam:   XR FOOT LEFT (2 VIEWS)  Date:  1/22/2022 History:  Female, age  47 years; fall COMPARISON:  None. Findings : There is no acute displaced fracture. No dislocation. No suspicious lytic or blastic lesion. No radiopaque foreign body. Vascular calcifications. Impression: No acute osseous pathology. Signed by Dr Heena Crain    XR CHEST PORTABLE    Result Date: 1/22/2022  Exam:   XR CHEST PORTABLE  Date:  1/22/2022 History:  Female, age  47 years; preop COMPARISON:  Chest x-ray dated January 10, 2022 Findings : Mild cardiomegaly. Central pulmonary venous congestion and coarsened interstitium. Right infrahilar opacity. The bones show no acute pathology. Vascular stent projecting over the left axilla and thoracic inlet. Impression: Cardiomegaly and interstitial coarsening, concerning for fluid overload. Right infrahilar opacity, may reflect atelectasis, aspiration or infection.  Signed by Dr Linus Ferguson   -ESRD  -DM2 with nephropathy on long-term insulin, uncontrolled  -HTN  -Hyponatremia  -Hypokalemia  -Secondary Hyperparathyroidism  -Anemia CKD  -Seizure disorder  -Hypothyroidism       Plan:  Discussed with patient, nursing  HD 1/25 per routine scheduling  Monitor labs  Pain management per primary service  Low potassium diet reviewed with patient      Dmitry Boyd MD  01/24/22  1:50 PM

## 2022-01-24 NOTE — CARE COORDINATION
[facility] Will follow. Please advise when patient discharges. WILL NEED RESUMPTION OF CARE ORDERS TO RESUME HH SERVICES WHEN PATIENT DISCHARGES. Thank you. [address] [place] [address] [phone]. [phone].     Sofya Johnson RN, Home Care Liaison  [phone] P  Electronically signed by Sofya Johnson on 1/24/2022 at 8:48 AM

## 2022-01-25 LAB
ANION GAP SERPL CALCULATED.3IONS-SCNC: 21 MMOL/L (ref 7–19)
BASOPHILS ABSOLUTE: 0.1 K/UL (ref 0–0.2)
BASOPHILS RELATIVE PERCENT: 1.3 % (ref 0–1)
BUN BLDV-MCNC: 58 MG/DL (ref 6–20)
CALCIUM SERPL-MCNC: 8.7 MG/DL (ref 8.6–10)
CHLORIDE BLD-SCNC: 93 MMOL/L (ref 98–111)
CO2: 22 MMOL/L (ref 22–29)
CREAT SERPL-MCNC: 5.9 MG/DL (ref 0.5–0.9)
EOSINOPHILS ABSOLUTE: 0.2 K/UL (ref 0–0.6)
EOSINOPHILS RELATIVE PERCENT: 3.8 % (ref 0–5)
GFR AFRICAN AMERICAN: 9
GFR NON-AFRICAN AMERICAN: 7
GLUCOSE BLD-MCNC: 169 MG/DL (ref 70–99)
GLUCOSE BLD-MCNC: 210 MG/DL (ref 70–99)
GLUCOSE BLD-MCNC: 64 MG/DL (ref 74–109)
GLUCOSE BLD-MCNC: 81 MG/DL (ref 70–99)
HCT VFR BLD CALC: 24.8 % (ref 37–47)
HEMOGLOBIN: 7.6 G/DL (ref 12–16)
IMMATURE GRANULOCYTES #: 0 K/UL
LYMPHOCYTES ABSOLUTE: 1 K/UL (ref 1.1–4.5)
LYMPHOCYTES RELATIVE PERCENT: 18.8 % (ref 20–40)
MCH RBC QN AUTO: 29.9 PG (ref 27–31)
MCHC RBC AUTO-ENTMCNC: 30.6 G/DL (ref 33–37)
MCV RBC AUTO: 97.6 FL (ref 81–99)
MONOCYTES ABSOLUTE: 0.7 K/UL (ref 0–0.9)
MONOCYTES RELATIVE PERCENT: 12.8 % (ref 0–10)
NEUTROPHILS ABSOLUTE: 3.5 K/UL (ref 1.5–7.5)
NEUTROPHILS RELATIVE PERCENT: 62.9 % (ref 50–65)
PDW BLD-RTO: 18.5 % (ref 11.5–14.5)
PERFORMED ON: ABNORMAL
PERFORMED ON: ABNORMAL
PERFORMED ON: NORMAL
PLATELET # BLD: 86 K/UL (ref 130–400)
PMV BLD AUTO: 10.3 FL (ref 9.4–12.3)
POTASSIUM REFLEX MAGNESIUM: 4.8 MMOL/L (ref 3.5–5)
RBC # BLD: 2.54 M/UL (ref 4.2–5.4)
SODIUM BLD-SCNC: 136 MMOL/L (ref 136–145)
WBC # BLD: 5.5 K/UL (ref 4.8–10.8)

## 2022-01-25 PROCEDURE — 6370000000 HC RX 637 (ALT 250 FOR IP): Performed by: NURSE PRACTITIONER

## 2022-01-25 PROCEDURE — 1210000000 HC MED SURG R&B

## 2022-01-25 PROCEDURE — 82947 ASSAY GLUCOSE BLOOD QUANT: CPT

## 2022-01-25 PROCEDURE — 85025 COMPLETE CBC W/AUTO DIFF WBC: CPT

## 2022-01-25 PROCEDURE — 6360000002 HC RX W HCPCS: Performed by: NURSE PRACTITIONER

## 2022-01-25 PROCEDURE — 80048 BASIC METABOLIC PNL TOTAL CA: CPT

## 2022-01-25 PROCEDURE — 8010000000 HC HEMODIALYSIS ACUTE INPT

## 2022-01-25 PROCEDURE — 2580000003 HC RX 258: Performed by: NURSE PRACTITIONER

## 2022-01-25 RX ORDER — CYCLOBENZAPRINE HCL 10 MG
10 TABLET ORAL 3 TIMES DAILY PRN
Status: DISCONTINUED | OUTPATIENT
Start: 2022-01-25 | End: 2022-01-27 | Stop reason: HOSPADM

## 2022-01-25 RX ADMIN — SEVELAMER CARBONATE 800 MG: 800 TABLET, FILM COATED ORAL at 12:02

## 2022-01-25 RX ADMIN — HYDROCODONE BITARTRATE AND ACETAMINOPHEN 1 TABLET: 5; 325 TABLET ORAL at 19:42

## 2022-01-25 RX ADMIN — SODIUM CHLORIDE, PRESERVATIVE FREE 10 ML: 5 INJECTION INTRAVENOUS at 20:36

## 2022-01-25 RX ADMIN — INSULIN GLARGINE 11 UNITS: 100 INJECTION, SOLUTION SUBCUTANEOUS at 20:36

## 2022-01-25 RX ADMIN — LISINOPRIL 20 MG: 10 TABLET ORAL at 12:01

## 2022-01-25 RX ADMIN — LISINOPRIL 20 MG: 10 TABLET ORAL at 20:36

## 2022-01-25 RX ADMIN — GUAIFENESIN 600 MG: 600 TABLET, EXTENDED RELEASE ORAL at 12:02

## 2022-01-25 RX ADMIN — HEPARIN SODIUM 5000 UNITS: 5000 INJECTION INTRAVENOUS; SUBCUTANEOUS at 00:58

## 2022-01-25 RX ADMIN — ONDANSETRON 4 MG: 2 INJECTION INTRAMUSCULAR; INTRAVENOUS at 20:59

## 2022-01-25 RX ADMIN — ROPINIROLE HYDROCHLORIDE 4 MG: 4 TABLET, FILM COATED ORAL at 20:36

## 2022-01-25 RX ADMIN — GUAIFENESIN 600 MG: 600 TABLET, EXTENDED RELEASE ORAL at 20:36

## 2022-01-25 RX ADMIN — HYDROCODONE BITARTRATE AND ACETAMINOPHEN 1 TABLET: 5; 325 TABLET ORAL at 01:02

## 2022-01-25 RX ADMIN — HYDRALAZINE HYDROCHLORIDE 100 MG: 25 TABLET, FILM COATED ORAL at 00:58

## 2022-01-25 RX ADMIN — ATORVASTATIN CALCIUM 40 MG: 40 TABLET, FILM COATED ORAL at 12:02

## 2022-01-25 RX ADMIN — HYDRALAZINE HYDROCHLORIDE 100 MG: 25 TABLET, FILM COATED ORAL at 18:02

## 2022-01-25 RX ADMIN — SENNOSIDES 8.6 MG: 8.6 TABLET, COATED ORAL at 12:02

## 2022-01-25 RX ADMIN — HEPARIN SODIUM 5000 UNITS: 5000 INJECTION INTRAVENOUS; SUBCUTANEOUS at 18:02

## 2022-01-25 RX ADMIN — HYDROCODONE BITARTRATE AND ACETAMINOPHEN 1 TABLET: 5; 325 TABLET ORAL at 05:21

## 2022-01-25 RX ADMIN — LACTULOSE 15.33 G: 20 SOLUTION ORAL at 15:30

## 2022-01-25 RX ADMIN — INSULIN LISPRO 6 UNITS: 100 INJECTION, SOLUTION INTRAVENOUS; SUBCUTANEOUS at 18:00

## 2022-01-25 RX ADMIN — CYCLOBENZAPRINE 10 MG: 10 TABLET, FILM COATED ORAL at 15:19

## 2022-01-25 RX ADMIN — NIFEDIPINE 90 MG: 90 TABLET, FILM COATED, EXTENDED RELEASE ORAL at 12:02

## 2022-01-25 RX ADMIN — SEVELAMER CARBONATE 800 MG: 800 TABLET, FILM COATED ORAL at 18:02

## 2022-01-25 RX ADMIN — LACTULOSE 15.33 G: 20 SOLUTION ORAL at 20:36

## 2022-01-25 RX ADMIN — HYDROCODONE BITARTRATE AND ACETAMINOPHEN 1 TABLET: 5; 325 TABLET ORAL at 12:01

## 2022-01-25 RX ADMIN — HYDRALAZINE HYDROCHLORIDE 10 MG: 20 INJECTION INTRAMUSCULAR; INTRAVENOUS at 15:20

## 2022-01-25 RX ADMIN — ISOSORBIDE MONONITRATE 120 MG: 60 TABLET, EXTENDED RELEASE ORAL at 12:02

## 2022-01-25 RX ADMIN — LEVOTHYROXINE SODIUM 150 MCG: 0.07 TABLET ORAL at 05:21

## 2022-01-25 RX ADMIN — INSULIN LISPRO 4 UNITS: 100 INJECTION, SOLUTION INTRAVENOUS; SUBCUTANEOUS at 18:00

## 2022-01-25 RX ADMIN — DULOXETINE 60 MG: 30 CAPSULE, DELAYED RELEASE ORAL at 12:02

## 2022-01-25 ASSESSMENT — PAIN SCALES - GENERAL
PAINLEVEL_OUTOF10: 3
PAINLEVEL_OUTOF10: 10
PAINLEVEL_OUTOF10: 9
PAINLEVEL_OUTOF10: 10
PAINLEVEL_OUTOF10: 7
PAINLEVEL_OUTOF10: 4
PAINLEVEL_OUTOF10: 7
PAINLEVEL_OUTOF10: 3

## 2022-01-25 ASSESSMENT — ENCOUNTER SYMPTOMS
DIARRHEA: 0
SINUS PAIN: 0
ABDOMINAL DISTENTION: 0
SORE THROAT: 0
TROUBLE SWALLOWING: 0
WHEEZING: 0
ABDOMINAL PAIN: 0
SHORTNESS OF BREATH: 0
NAUSEA: 0
VOMITING: 0
CONSTIPATION: 0
COUGH: 0
BLOOD IN STOOL: 0

## 2022-01-25 ASSESSMENT — PAIN SCALES - WONG BAKER: WONGBAKER_NUMERICALRESPONSE: 0

## 2022-01-25 ASSESSMENT — PAIN DESCRIPTION - FREQUENCY: FREQUENCY: CONTINUOUS

## 2022-01-25 ASSESSMENT — PAIN DESCRIPTION - PROGRESSION: CLINICAL_PROGRESSION: NOT CHANGED

## 2022-01-25 ASSESSMENT — PAIN DESCRIPTION - ORIENTATION
ORIENTATION: LEFT

## 2022-01-25 ASSESSMENT — PAIN DESCRIPTION - LOCATION
LOCATION: LEG

## 2022-01-25 ASSESSMENT — PAIN DESCRIPTION - DIRECTION: RADIATING_TOWARDS: NO

## 2022-01-25 ASSESSMENT — PAIN - FUNCTIONAL ASSESSMENT: PAIN_FUNCTIONAL_ASSESSMENT: PREVENTS OR INTERFERES WITH ALL ACTIVE AND SOME PASSIVE ACTIVITIES

## 2022-01-25 ASSESSMENT — PAIN DESCRIPTION - PAIN TYPE
TYPE: ACUTE PAIN

## 2022-01-25 ASSESSMENT — PAIN DESCRIPTION - DESCRIPTORS: DESCRIPTORS: DISCOMFORT

## 2022-01-25 ASSESSMENT — PAIN DESCRIPTION - ONSET: ONSET: ON-GOING

## 2022-01-25 NOTE — PROGRESS NOTES
01/25/22 0909   General Comment   Comments Attempt patient in HD   Physical Therapy    Electronically signed by Mercy Chan PTA on 1/25/2022 at 9:10 AM

## 2022-01-25 NOTE — PROGRESS NOTES
[facility]      Progress Note    Patient:  Ed Hernandez  YOB: 1970  Date of Service: 1/25/2022  MRN: [id number]   Acct: [de-identified]   Primary Care Physician: ROMINA Pringle  Advance Directive: Full Code  Admit Date: 1/22/2022       Hospital Day: 3    Portions of this note have been copied forward, however, updated to reflect the most current clinical status of this patient. CHIEF COMPLAINT Left Leg pain after a fall at home     SUBJECTIVE:  Ms. Leandro Doll was seen in dialysis this morning. Continues to report LLE pain. Denies shortness of breath or chest pain at this time. CUMULATIVE HOSPITAL COURSE:   The patient is a 49 y. o. female with PMH of ESRD on HD, DM, HTN, restless leg syndrome, and arthritis who presented to [facility] ED complaining of left leg pain after a fall at home. Ms. Leandro Doll reported she was getting ready for dialysis on day of admission when she fell. Reported again \"a pop\" in her left knee when she fell. Denied head injury or LOC. Work-up in ED revealed acute nondisplaced fracture through the distal femoral diaphysis of the left femur. Chest x-ray indicated cardiomegaly and interstitial coarsening, concerning for fluid overload, right infrahilar opacity, may reflect atelectasis, aspiration, infection. BG greater than 500, SBP over 200. BP and Glucose stabilized in ED. Patient was admitted to hospital medicine for closed fracture of the left distal femur with orthopedic surgery consultation, hypoglycemia, and hypertensive urgency. Home BP medications resumed, BP now within acceptable limits. Resumed home Lantus and prandial insulin, glucose now stabilized. Patient was evaluated by orthopedic surgery whom recommended nonsurgical management at this time with knee immobilizer in place and nonweightbearing on the LLE for 12 weeks. Case management assisting with SNF placement.       Review of Systems   Constitutional: Negative for chills, diaphoresis, fatigue and fever.   HENT: Negative for congestion, ear pain, sinus pain, sore throat and trouble swallowing. Eyes: Negative for visual disturbance. Respiratory: Negative for cough, shortness of breath and wheezing. Cardiovascular: Negative for chest pain, palpitations and leg swelling. Gastrointestinal: Negative for abdominal distention, abdominal pain, blood in stool, constipation, diarrhea, nausea and vomiting. Endocrine: Negative for cold intolerance and heat intolerance. Genitourinary: Negative for difficulty urinating, flank pain, frequency and urgency. Musculoskeletal: Positive for arthralgias (Left hip and knee pain ). Negative for myalgias. Neurological: Negative for dizziness, syncope, weakness, light-headedness, numbness and headaches. Hematological: Does not bruise/bleed easily. Psychiatric/Behavioral: Negative for agitation, confusion and dysphoric mood. Objective:   VITALS:  BP (!) 190/74   Pulse 80   Temp 98.4 °F (36.9 °C) (Temporal)   Resp 16   Ht 5' 6\" (1.676 m)   Wt 144 lb (65.3 kg)   SpO2 96%   BMI 23.24 kg/m²   24HR INTAKE/OUTPUT:      Intake/Output Summary (Last 24 hours) at 1/25/2022 1505  Last data filed at 1/25/2022 1356  Gross per 24 hour   Intake 240 ml   Output    Net 240 ml           Physical Exam  Constitutional:       General: She is not in acute distress. Appearance: Normal appearance. She is not toxic-appearing or diaphoretic. HENT:      Head: Normocephalic and atraumatic. Right Ear: External ear normal.      Left Ear: External ear normal.      Nose: Nose normal. No congestion or rhinorrhea. Mouth/Throat:      Mouth: Mucous membranes are moist.      Pharynx: Oropharynx is clear. Eyes:      General: No scleral icterus. Extraocular Movements: Extraocular movements intact. Conjunctiva/sclera: Conjunctivae normal.   Cardiovascular:      Rate and Rhythm: Normal rate and regular rhythm. Pulses: Normal pulses.       Heart sounds: Normal heart sounds. No murmur heard. No friction rub. No gallop. Pulmonary:      Effort: Pulmonary effort is normal. No respiratory distress. Breath sounds: Normal breath sounds. No wheezing, rhonchi or rales. Abdominal:      General: Abdomen is flat. Bowel sounds are normal. There is no distension. Palpations: Abdomen is soft. Tenderness: There is abdominal tenderness. Musculoskeletal:         General: No swelling. Normal range of motion. Cervical back: Normal range of motion and neck supple. Right lower leg: No edema. Left lower leg: No edema. Comments: Knee immobilizer in place to LLE     Skin:     General: Skin is warm and dry. Coloration: Skin is not jaundiced. Findings: No erythema, lesion or rash. Neurological:      General: No focal deficit present. Mental Status: She is alert and oriented to person, place, and time. Mental status is at baseline. Cranial Nerves: No cranial nerve deficit. Sensory: No sensory deficit. Motor: No weakness. Psychiatric:         Mood and Affect: Mood normal.         Behavior: Behavior normal.         Thought Content:  Thought content normal.         Judgment: Judgment normal.            Medications:      dextrose      sodium chloride        [START ON 1/26/2022] epoetin tish-epbx  10,000 Units SubCUTAneous Once per day on Mon Wed Fri    cloNIDine  1 patch TransDERmal Weekly    DULoxetine  60 mg Oral Daily    vitamin D3  400 Units Oral Q14 Days    hydrALAZINE  100 mg Oral 3 times per day    isosorbide mononitrate  120 mg Oral Daily    lactulose  15.3333 g Oral TID    levothyroxine  150 mcg Oral Daily    lisinopril  20 mg Oral BID    metoprolol succinate  150 mg Oral QAM AC    nicotine  1 patch TransDERmal Daily    rOPINIRole  4 mg Oral Nightly    senna  1 tablet Oral Daily    sevelamer  800 mg Oral TID WC    atorvastatin  40 mg Oral Daily    sodium chloride flush  5-40 mL IntraVENous 2 times per day    heparin (porcine)  5,000 Units SubCUTAneous 3 times per day    guaiFENesin  600 mg Oral BID    insulin glargine  0.15 Units/kg SubCUTAneous Nightly    insulin lispro  0.05 Units/kg SubCUTAneous TID WC    insulin lispro  0-18 Units SubCUTAneous TID     insulin lispro  0-9 Units SubCUTAneous Nightly    NIFEdipine  90 mg Oral Daily     cyclobenzaprine, hydrALAZINE, ipratropium, HYDROcodone-acetaminophen, glucose, dextrose, glucagon (rDNA), dextrose, sodium chloride flush, sodium chloride, ondansetron **OR** ondansetron, polyethylene glycol, acetaminophen **OR** acetaminophen, aluminum & magnesium hydroxide-simethicone, albuterol  ADULT DIET; Easy to Chew; 4 carb choices (60 gm/meal); Low Fat/Low Chol/High Fiber/LUIS; Low Sodium (2 gm); Low Potassium (Less than 3000 mg/day); Low Phosphorus (Less than 1000 mg); 60 to 80 gm     Lab and other Data:     Recent Labs     01/23/22 0537 01/24/22 0456 01/25/22 0422   WBC 6.0 6.5 5.5   HGB 7.8* 7.7* 7.6*   PLT 91* 95* 86*     Recent Labs     01/23/22 0537 01/24/22 0456 01/25/22 0422    132* 136   K 3.6 4.1 4.8   CL 98 91* 93*   CO2 26 25 22   BUN 26* 44* 58*   CREATININE 3.5* 5.0* 5.9*   GLUCOSE 185* 129* 64*       RAD:     XR PELVIS (1-2 VIEWS)  Result Date: 1/22/2022    Impression: No acute osseous pathology. Signed by Dr Eh Brand      XR FEMUR LEFT (MIN 2 VIEWS)  Result Date: 1/22/2022    Impression: Acute nondisplaced fracture through the distal femoral diaphysis. Signed by Dr Eh Brand      XR KNEE LEFT (1-2 VIEWS)  Result Date: 1/22/2022    Impression: Acute nondisplaced fracture through the distal femoral diaphysis. Signed by Dr Eh Brand      XR FOOT LEFT (2 VIEWS)  Result Date: 1/22/2022    Impression: No acute osseous pathology. Signed by Dr Eh Brand      XR CHEST PORTABLE  Result Date: 1/22/2022    Impression: Cardiomegaly and interstitial coarsening, concerning for fluid overload.  Right infrahilar opacity, may reflect atelectasis, aspiration or infection. Signed by Dr Maddi Lira:    [phone], Khari Barahona [[id number]] Collected: 01/22/22 0627   Order Status: Completed Specimen: Nasopharyngeal Swab Updated: 01/22/22 0650    SARS-CoV-2, NAAT Not Detected     Culture, Blood 2 [[id number]] Collected: 01/22/22 1451   Order Status: Completed Specimen: Blood Updated: 01/23/22 1714    Culture, Blood 2 No Growth to date.  Any change in status will be called. Assessment/Plan   Principal Problem:    Closed fracture of left distal femur (HCC)  Active Problems:    Anemia in chronic kidney disease (CKD)    Noncompliance of patient with renal dialysis ([address]    Uncontrolled type 2 diabetes mellitus with complication, with long-term current use of insulin ([address]    ESRD (end stage renal disease) on dialysis ([address]    Hyperglycemia due to type 2 diabetes mellitus ([address]    Uncontrolled hypertension    Medically noncompliant    Closed fracture of distal end of left femur, initial encounter ([address]  Resolved Problems:    * No resolved hospital problems.  *      Principal Problem:    Closed fracture of left distal femur ([address]-    - Assessed by Orthopedic surgery with recommendations of non-surgical management at this time    - PT/OT per ortho recommendations    - Case management assisting with SNF placement    - Knee immobilizer in place         Active Problems:    Anemia in chronic kidney disease (CKD)-    - monitor labs closely       Noncompliance of patient with renal dialysis ([address]- noted      Hyperglycemia due to type 2 diabetes mellitus (HCC)/ Uncontrolled type 2 diabetes mellitus with complication, with long-term current use of insulin ([address]- continue Lantus, SSI, Accuchecks, hypoglycemia treatment protocol in place       ESRD (end stage renal disease) on dialysis [facility])- nephrology managing HD      Uncontrolled hypertension- elevated at times, continue current medications, as needed hydralazine        Medically noncompliant- noted DVT Prophylaxis: Heparin SubQ    Discharge planning: Case management working on placement      Further Orders per Clinical course/attending. Electronically signed by ROMINA Herrera CNP on 1/25/2022 at 3:05 PM       EMR Dragon/Transcription disclaimer:   Much of this encounter note is an electronic transcription/translation of spoken language to printed text.  The electronic translation of spoken language may permit erroneous, or at times, nonsensical words or phrases to be inadvertently transcribed; although attempts have made to review the note for such errors, some may still exist.

## 2022-01-25 NOTE — CONSULTS
Palliative Care:      Pt well known to Palliative Care. Pt lying in bed when SN arrived to the room. Pt states she is having spasms in her leg and rates pain 12/10. Past Medical History:        Past Medical History:   Diagnosis Date    Arthritis     Chronic kidney disease, stage 5, kidney failure (HCC)     Closed fracture of right shoulder girdle, with routine healing, subsequent encounter     DM (diabetes mellitus) type II controlled with renal manifestation ([facility] 06/1993    Gastroparesis     GERD (gastroesophageal reflux disease)     Hemodialysis patient ([facility]     dialysis on tues, thur, and sat at [facility]    Hypertension     Hypothyroid     Nasal congestion     recent    Neuropathy     Noncompliance with medications     Palliative care patient 10/08/2019    Restless leg syndrome        Advance Directives:    Yes, FULL CODE     Pain/Other Symptoms:    Pt c/o severe pain to her leg, rates pain 12/10 on numeric scale. SN notifed Molly SCHULZ whom states she will call the MD due pt had pain pill 2hrs ago and nothing else ordered. Activity:  NWB to Left leg at this time. Psychological/Spiritual:   Pt states good support from friends. Plan:    Pt to d/c to SNF for rehab. Patient/family discussion r/t goals: Pt wishes to go to SNF local in [place]. SW is attempting to find bed if possible.          Review of any needed services:  SNF placement            Electronically signed by Christiano Dubois RN on 1/25/2022 at 2:51 PM

## 2022-01-25 NOTE — PROGRESS NOTES
Nephrology ([address] [facility]) Progress Note    Patient:  Dian Anthony  YOB: 1970  Date of Service: 1/25/2022  MRN: [id number]   Acct: [de-identified]   Primary Care Physician: ROMINA Bassett  Advance Directive: Full Code  Admit Date: 1/22/2022       Hospital Day: 3  Referring Provider: Ratna Tripathi MD    Patient Seen, Chart, Consults, Notes, Labs, Radiology studies reviewed. Subjective:  Dian Anthony is a 47 y.o. female for whom we were consulted for evaluation and treatment of end-stage renal disease. Patient had a fall at home from slipping in her bathroom and fell and broke her hip. She is complaining of pain at the site. Recalled no recent issues with dialysis. Denies chest pain, shortness of air, nausea or vomiting. Denies loss of consciousness. She noted no operative plans for fracture management. Today, patient offers no new complaints. She was seen and examined during dialysis.       Dialysis   Pt was seen on RRT  Modality: Hemodialysis  Access: Arterial Venous Fistula  Location: left upper  QB: 400  QD: 600  UF: 3 liters    Allergies:  Penicillins, Lamisil advanced [terbinafine], Reglan [metoclopramide], and Stadol [butorphanol]    Medicines:  Current Facility-Administered Medications   Medication Dose Route Frequency Provider Last Rate Last Admin    hydrALAZINE (APRESOLINE) injection 10 mg  10 mg IntraVENous Q6H PRN ROMINA Rider CNP        ipratropium (ATROVENT) 0.02 % nebulizer solution 0.5 mg  0.5 mg Nebulization 4x Daily PRN Sallie Peabody, APRN - CNP        cloNIDine (CATAPRES) 0.1 MG/24HR 1 patch  1 patch TransDERmal Weekly Sallie Peabody, APRN - CNP        DULoxetine (CYMBALTA) extended release capsule 60 mg  60 mg Oral Daily Sallie Peabody, APRN - CNP   60 mg at 01/24/22 0918    vitamin D3 (CHOLECALCIFEROL) tablet 400 Units  400 Units Oral Q14 Days Sallie Peabody, APRN - CNP   400 Units at 01/22/22 1646    hydrALAZINE (APRESOLINE) tablet 100 mg  100 mg Oral 3 times per day Raquel Pickraddouglas APRN - CNP   100 mg at 01/25/22 0058    HYDROcodone-acetaminophen ([address]) 5-325 MG per tablet 1 tablet  1 tablet Oral Q4H PRN Raqueladitya Kahn APRN - CNP   1 tablet at 01/25/22 0521    isosorbide mononitrate (IMDUR) extended release tablet 120 mg  120 mg Oral Daily Raqueladitya Kahn APRN - CNP   120 mg at 01/24/22 0918    lactulose (CHRONULAC) 10 GM/15ML solution 15.3333 g  15.3333 g Oral TID Raqueladitya Kahn APRN - CNP   15.3333 g at 01/24/22 2016    levothyroxine (SYNTHROID) tablet 150 mcg  150 mcg Oral Daily Raqueladitya Kahn APRN - CNP   150 mcg at 01/25/22 0521    lisinopril (PRINIVIL;ZESTRIL) tablet 20 mg  20 mg Oral BID Raqueladitya Kahn APRN - CNP   20 mg at 01/24/22 2016    metoprolol succinate (TOPROL XL) extended release tablet 150 mg  150 mg Oral QAM AC Raqueladitya Kahn APRN - CNP   150 mg at 01/24/22 0610    nicotine (NICODERM CQ) 14 MG/24HR 1 patch  1 patch TransDERmal Daily Raqueladitya Kahn APRN - CNP   1 patch at 01/24/22 0918    rOPINIRole (REQUIP) tablet 4 mg  4 mg Oral Nightly Raqueladitya Kahn APRN - CNP   4 mg at 01/24/22 2016    senna (SENOKOT) tablet 8.6 mg  1 tablet Oral Daily Raqueladitya Kahn APRN - CNP   8.6 mg at 01/24/22 0918    sevelamer (RENVELA) tablet 800 mg  800 mg Oral TID WC Raqueladitya Kahn APRN - CNP   800 mg at 01/24/22 1624    atorvastatin (LIPITOR) tablet 40 mg  40 mg Oral Daily Raqueladitya Kahn APRN - CNP   40 mg at 01/24/22 0918    glucose (GLUTOSE) 40 % oral gel 15 g  15 g Oral PRN Raquel Kahn, APRN - CNP        dextrose 50 % IV solution  12.5 g IntraVENous PRN Raquel Kahn APRN - CNP        glucagon (rDNA) injection 1 mg  1 mg IntraMUSCular PRN ROMINA Medley CNP        dextrose 5 % solution  100 mL/hr IntraVENous PRN ROMINA Medley CNP        sodium chloride flush 0.9 % injection 5-40 mL  5-40 mL IntraVENous 2 times per day ROMINA Rueda CNP   10 mL at 01/24/22 2017    sodium chloride flush 0.9 % injection 5-40 mL  5-40 mL IntraVENous PRN ROMINA Rueda CNP        0.9 % sodium chloride infusion  25 mL IntraVENous PRN ROMINA Rueda CNP        ondansetron (ZOFRAN-ODT) disintegrating tablet 4 mg  4 mg Oral Q8H PRN ROMINA Rueda CNP        Or    ondansetron (ZOFRAN) injection 4 mg  4 mg IntraVENous Q6H PRN ROMINA Rueda - ALEX        polyethylene glycol (GLYCOLAX) packet 17 g  17 g Oral Daily PRN ROMINA Rueda CNP        acetaminophen (TYLENOL) tablet 650 mg  650 mg Oral Q6H PRN ROMINA Rueda CNP        Or    acetaminophen (TYLENOL) suppository 650 mg  650 mg Rectal Q6H PRN ROMINA Rueda CNP        aluminum & magnesium hydroxide-simethicone (MAALOX) 200-200-20 MG/5ML suspension 30 mL  30 mL Oral Q6H PRN ROMINA Rueda CNP        heparin (porcine) injection 5,000 Units  5,000 Units SubCUTAneous 3 times per day ROMINA Rueda CNP   5,000 Units at 01/25/22 0058    guaiFENesin (MUCINEX) extended release tablet 600 mg  600 mg Oral BID ROMINA Rueda CNP   600 mg at 01/24/22 2016    insulin glargine (LANTUS) injection vial 11 Units  0.15 Units/kg SubCUTAneous Nightly ROMINA Rueda CNP   11 Units at 01/24/22 2017    insulin lispro (HUMALOG) injection vial 4 Units  0.05 Units/kg SubCUTAneous TID  ROMINA Rueda CNP   4 Units at 01/24/22 0937    insulin lispro (HUMALOG) injection vial 0-18 Units  0-18 Units SubCUTAneous TID  ROMINA Rueda CNP   2 Units at 01/23/22 0859    insulin lispro (HUMALOG) injection vial 0-9 Units  0-9 Units SubCUTAneous Nightly ROMINA Rueda CNP   3 Units at 01/23/22 2115    albuterol (PROVENTIL) nebulizer solution 2.5 mg  2.5 mg Nebulization Q4H PRN ROMINA Rueda - CNP        NIFEdipine (PROCARDIA XL) extended release tablet 90 mg  90 mg Oral Daily Willma Solid, APRN - CNP   90 mg at 01/24/22 6861       Past Medical History:  Past Medical History:   Diagnosis Date    Arthritis     Chronic kidney disease, stage 5, kidney failure (HCC)     Closed fracture of right shoulder girdle, with routine healing, subsequent encounter     DM (diabetes mellitus) type II controlled with renal manifestation ([facility] 06/1993    Gastroparesis     GERD (gastroesophageal reflux disease)     Hemodialysis patient ([facility]     dialysis on tuwatson, thur, and sat at [facility]    Hypertension     Hypothyroid     Nasal congestion     recent    Neuropathy     Noncompliance with medications     Palliative care patient 10/08/2019    Restless leg syndrome        Past Surgical History:  Past Surgical History:   Procedure Laterality Date    BREAST SURGERY Left 2008    infected milk gland removed    BREAST SURGERY Right 5/25/2021    WEDGE EXCISION RIGHT BREAST DUCT WITH LACRIMAL DUCT PROBE, PEC BLOCK performed by Steven Clifton MD at [facility]      2008    COLONOSCOPY Left 9/17/2020    Dr Hernández Perish hepatic flexure-Severe tortuosity with severe spasming, 1 yr recall    DIALYSIS FISTULA CREATION Left 1/25/2019    REVISION LEFT UPPER EXTREMITY AV ACCESS WITH LEFT BRACHIAL ARTERY TO LEFT AXILLARY VEIN WITH  INTERPOSITIONAL ARTEGRAFT   performed by Parish Sherman MD at [address]  2012    [address] Right 1/25/2019    INSERTION OF RIGHT INTERNAL JUGULAR HEMODIALYSIS CATHETER performed by Parish Sherman MD at [address] [facility]  08/17/2018    1.  Moderately increased stainable iron identified by special stain in Kupffer cells and occasional hepatocytes. Negative for evidence of significant portal or lobular inflammation.  Negative for evidence of significant fibrosis    ID COLONOSCOPY W/BIOPSY SINGLE/MULTIPLE N/A 10/20/2017    Dr Deirdre Gaston prep-Tubular AP (-) dysplasia x 2--3 yr recall    NH EGD TRANSORAL BIOPSY SINGLE/MULTIPLE N/A 2016    Dr Winsome Hayes EGD TRANSORAL BIOPSY SINGLE/MULTIPLE N/A 10/20/2017    Dr Li Cross (-)    TUBAL LIGATION          VASCULAR SURGERY Left 2016    fistula by Dr Narciso Rodgers at [address]  10/02/2017    [facility]. Left upper fistulograms/venograms, balloon angioplasty cephalic vein arch 09T90 conquest.    VASCULAR SURGERY  2018    FISTULOGRAM    VASCULAR SURGERY  10/29/2018    [facility]. Left upper fistulograms/venograms    VASCULAR SURGERY  2018    [facility]. Arch aortogram,left upper arteriograms.  VASCULAR SURGERY  2019    [facility]. Removal of tunneled dialyis catheter right internal jugular vein.  VASCULAR SURGERY  2019    [facility]. Left upper extremity fistulogram including venography to the superior vena cava. Balloon angioplasty left subclavian vein with 10mm x 40mm conquest balloon. Balloon angioplasty mid/proximal upper arm cephalic vein with 50MI x 40mm conquest balloon. Venograms after balloon angioplasty. Stent left mid/proximal upper arm cephalic vein stenosis fluency 10mm x 60mm self-expanding covered stent. Balloon     VASCULAR SURGERY  2019    cont angioplasty stent with 10mm x 40mm conquest balloon. Completion venograms left upper extremity.        Family History  Family History   Problem Relation Age of Onset    Colon Cancer Mother          at 63    Colon Polyps Mother     Lung Cancer Father          at 79    Colon Polyps Father     Stomach Cancer Sister     Breast Cancer Sister 27    Depression Daughter 25        committed suicide    Liver Cancer Neg Hx     Liver Disease Neg Hx     Esophageal Cancer Neg Hx     Rectal Cancer Neg Hx        Social History  Social History     Socioeconomic History    Marital status: Single     Spouse name: Not on file    Number of children: 2    Years of education: Not on file    Highest education level: Not on file   Occupational History    Not on file   Tobacco Use    Smoking status: Current Every Day Smoker     Packs/day: 0.50     Years: 33.00     Pack years: 16.50     Types: Cigarettes    Smokeless tobacco: Never Used    Tobacco comment: NOW at 1/4 PD, started at age 24 and has averaged 2 PPD   Vaping Use    Vaping Use: Never used   Substance and Sexual Activity    Alcohol use: No    Drug use: Not Currently     Types: Marijuana Michael Meckel)    Sexual activity: Not Currently     Partners: Male   Other Topics Concern    Not on file   Social History Narrative    Not on file     Social Determinants of Health     Financial Resource Strain:     Difficulty of Paying Living Expenses: Not on file   Food Insecurity:     Worried About Running Out of Food in the Last Year: Not on file    Kendy of Food in the Last Year: Not on file   Transportation Needs:     Lack of Transportation (Medical): Not on file    Lack of Transportation (Non-Medical):  Not on file   Physical Activity:     Days of Exercise per Week: Not on file    Minutes of Exercise per Session: Not on file   Stress:     Feeling of Stress : Not on file   Social Connections:     Frequency of Communication with Friends and Family: Not on file    Frequency of Social Gatherings with Friends and Family: Not on file    Attends Mu-ism Services: Not on file    Active Member of [address] [place] [address] [employer] or Organizations: Not on file    Attends Club or Organization Meetings: Not on file    Marital Status: Not on file   Intimate Partner Violence:     Fear of Current or Ex-Partner: Not on file    Emotionally Abused: Not on file    Physically Abused: Not on file    Sexually Abused: Not on file   Housing Stability:     Unable to Pay for Housing in the Last Year: Not on file    Number of [place] in the Last Year: Not on file    Unstable Housing in the Last Year: Not on file         Review of Systems:  Reviewed with the patient at the bedside, 6 points reviewed and negative except as noted above.      Objective:  BP: 162/76   HR: 68  General: awake/alert   Chest:  clear to auscultation bilaterally without respiratory distress  CVS: regular rate and rhythm  Abdominal: soft, nontender, normal bowel sounds  Extremities: no cyanosis or edema  Skin: warm and dry without rash    Labs:  BMP:   Recent Labs     01/23/22  0537 01/24/22  0456 01/25/22  0422    132* 136   K 3.6 4.1 4.8   CL 98 91* 93*   CO2 26 25 22   BUN 26* 44* 58*   CREATININE 3.5* 5.0* 5.9*   CALCIUM 9.0 8.7 8.7     CBC:   Recent Labs     01/23/22  0537 01/24/22  0456 01/25/22  0422   WBC 6.0 6.5 5.5   HGB 7.8* 7.7* 7.6*   HCT 26.4* 25.5* 24.8*   .0* 99.6* 97.6   PLT 91* 95* 86*     LIVER PROFILE: No results for input(s): AST, ALT, LIPASE, BILIDIR, BILITOT, ALKPHOS in the last 72 hours. Invalid input(s): AMYLASE,  ALB  PT/INR: No results for input(s): PROTIME, INR in the last 72 hours. APTT: No results for input(s): APTT in the last 72 hours. BNP:  No results for input(s): BNP in the last 72 hours. Ionized Calcium:No results for input(s): IONCA in the last 72 hours. Magnesium:No results for input(s): MG in the last 72 hours. Phosphorus:No results for input(s): PHOS in the last 72 hours. HgbA1C: No results for input(s): LABA1C in the last 72 hours. Hepatic: No results for input(s): ALKPHOS, ALT, AST, PROT, BILITOT, BILIDIR, LABALBU in the last 72 hours. Lactic Acid: No results for input(s): LACTA in the last 72 hours. Troponin: No results for input(s): CKTOTAL, CKMB, TROPONINT in the last 72 hours. ABGs: No results for input(s): PH, PCO2, PO2, HCO3, O2SAT in the last 72 hours. CRP:  No results for input(s): CRP in the last 72 hours. Sed Rate:  No results for input(s): SEDRATE in the last 72 hours. Cultures:   No results for input(s): CULTURE in the last 72 hours. Recent Labs     01/22/22  1458   BLOODCULT2 No Growth to date. Any change in status will be called.      No results for input(s): CXSURG in the last 72 hours. Radiology reports as per the Radiologist  Radiology: XR PELVIS (1-2 VIEWS)    Result Date: 1/22/2022  Exam:   XR PELVIS (1-2 VIEWS)  Date:  1/22/2022 History:  Female, age  47 years; fall COMPARISON:  None. Findings : There is no acute displaced fracture. No dislocation. No suspicious lytic or blastic lesion. No radiopaque foreign body. Impression: No acute osseous pathology. Signed by Dr Jamilah Martin    XR FEMUR LEFT (MIN 2 VIEWS)    Result Date: 1/22/2022  Exam:   XR FEMUR LEFT (MIN 2 VIEWS), XR KNEE LEFT (1-2 VIEWS)  Date:  1/22/2022 History:  Female, age  47 years; fall COMPARISON:  None. Findings : Demineralized skeleton limits bony details. Linear lucency in the distal femur extending through the posterior cortex, involving the distal diaphysis, is consistent with an acute nondisplaced fracture. There is no dislocation. Impression: Acute nondisplaced fracture through the distal femoral diaphysis. Signed by Dr Jamilah Martin    XR KNEE LEFT (1-2 VIEWS)    Result Date: 1/22/2022  Exam:   XR FEMUR LEFT (MIN 2 VIEWS), XR KNEE LEFT (1-2 VIEWS)  Date:  1/22/2022 History:  Female, age  47 years; fall COMPARISON:  None. Findings : Demineralized skeleton limits bony details. Linear lucency in the distal femur extending through the posterior cortex, involving the distal diaphysis, is consistent with an acute nondisplaced fracture. There is no dislocation. Impression: Acute nondisplaced fracture through the distal femoral diaphysis. Signed by Dr Jamilah Martin    XR FOOT LEFT (2 VIEWS)    Result Date: 1/22/2022  Exam:   XR FOOT LEFT (2 VIEWS)  Date:  1/22/2022 History:  Female, age  47 years; fall COMPARISON:  None. Findings : There is no acute displaced fracture. No dislocation. No suspicious lytic or blastic lesion. No radiopaque foreign body. Vascular calcifications. Impression: No acute osseous pathology.  Signed by Dr Jamilah Martin    XR CHEST PORTABLE    Result Date: 1/22/2022  Exam:   XR

## 2022-01-25 NOTE — PROGRESS NOTES
Patient stated that she was experiencing pain. When asked to rate her pain using the 0-10 pain scale, she stated that it was a 12. She stated that it feels like a muscle spasm. She had been given Norco at 1200, which is schedule q4h. I messaged Diane Martin regarding the patients pain. She ordered the pt cyclobenzaprine 3 times daily PRN. She has also stated that she would like the patient to have a dose of hydralazine due to her systolic blood pressure being in the 190's. I will administer the cyclobenzaprine and hydralazine and recheck her bp and pain.

## 2022-01-26 PROBLEM — N39.0 UTI (URINARY TRACT INFECTION): Status: ACTIVE | Noted: 2022-01-26

## 2022-01-26 LAB
ANION GAP SERPL CALCULATED.3IONS-SCNC: 19 MMOL/L (ref 7–19)
BASOPHILS ABSOLUTE: 0.1 K/UL (ref 0–0.2)
BASOPHILS RELATIVE PERCENT: 1.2 % (ref 0–1)
BILIRUBIN URINE: NEGATIVE
BLOOD, URINE: ABNORMAL
BUN BLDV-MCNC: 32 MG/DL (ref 6–20)
CALCIUM SERPL-MCNC: 8.7 MG/DL (ref 8.6–10)
CHLORIDE BLD-SCNC: 97 MMOL/L (ref 98–111)
CLARITY: ABNORMAL
CO2: 23 MMOL/L (ref 22–29)
COLOR: ABNORMAL
CREAT SERPL-MCNC: 4.5 MG/DL (ref 0.5–0.9)
EOSINOPHILS ABSOLUTE: 0.1 K/UL (ref 0–0.6)
EOSINOPHILS RELATIVE PERCENT: 2.7 % (ref 0–5)
GFR AFRICAN AMERICAN: 12
GFR NON-AFRICAN AMERICAN: 10
GLUCOSE BLD-MCNC: 103 MG/DL (ref 70–99)
GLUCOSE BLD-MCNC: 104 MG/DL (ref 74–109)
GLUCOSE BLD-MCNC: 143 MG/DL (ref 70–99)
GLUCOSE BLD-MCNC: 157 MG/DL (ref 70–99)
GLUCOSE BLD-MCNC: 181 MG/DL (ref 70–99)
GLUCOSE URINE: NEGATIVE MG/DL
HCT VFR BLD CALC: 27.8 % (ref 37–47)
HEMOGLOBIN: 8.5 G/DL (ref 12–16)
IMMATURE GRANULOCYTES #: 0 K/UL
KETONES, URINE: 15 MG/DL
LEUKOCYTE ESTERASE, URINE: ABNORMAL
LYMPHOCYTES ABSOLUTE: 0.9 K/UL (ref 1.1–4.5)
LYMPHOCYTES RELATIVE PERCENT: 16.7 % (ref 20–40)
MCH RBC QN AUTO: 30.1 PG (ref 27–31)
MCHC RBC AUTO-ENTMCNC: 30.6 G/DL (ref 33–37)
MCV RBC AUTO: 98.6 FL (ref 81–99)
MONOCYTES ABSOLUTE: 0.8 K/UL (ref 0–0.9)
MONOCYTES RELATIVE PERCENT: 14.6 % (ref 0–10)
NEUTROPHILS ABSOLUTE: 3.4 K/UL (ref 1.5–7.5)
NEUTROPHILS RELATIVE PERCENT: 64.6 % (ref 50–65)
NITRITE, URINE: NEGATIVE
PDW BLD-RTO: 18.2 % (ref 11.5–14.5)
PERFORMED ON: ABNORMAL
PH UA: 6 (ref 5–8)
PLATELET # BLD: 81 K/UL (ref 130–400)
PMV BLD AUTO: 9.7 FL (ref 9.4–12.3)
POTASSIUM REFLEX MAGNESIUM: 5 MMOL/L (ref 3.5–5)
PROTEIN UA: >=300 MG/DL
RBC # BLD: 2.82 M/UL (ref 4.2–5.4)
SODIUM BLD-SCNC: 139 MMOL/L (ref 136–145)
SPECIFIC GRAVITY UA: 1.02 (ref 1–1.03)
UROBILINOGEN, URINE: 0.2 E.U./DL
WBC # BLD: 5.2 K/UL (ref 4.8–10.8)
WBC UA: ABNORMAL /HPF (ref 0–5)

## 2022-01-26 PROCEDURE — 97530 THERAPEUTIC ACTIVITIES: CPT

## 2022-01-26 PROCEDURE — 80048 BASIC METABOLIC PNL TOTAL CA: CPT

## 2022-01-26 PROCEDURE — 6360000002 HC RX W HCPCS: Performed by: INTERNAL MEDICINE

## 2022-01-26 PROCEDURE — 6360000002 HC RX W HCPCS: Performed by: NURSE PRACTITIONER

## 2022-01-26 PROCEDURE — 85025 COMPLETE CBC W/AUTO DIFF WBC: CPT

## 2022-01-26 PROCEDURE — 97535 SELF CARE MNGMENT TRAINING: CPT

## 2022-01-26 PROCEDURE — 36415 COLL VENOUS BLD VENIPUNCTURE: CPT

## 2022-01-26 PROCEDURE — 6370000000 HC RX 637 (ALT 250 FOR IP): Performed by: NURSE PRACTITIONER

## 2022-01-26 PROCEDURE — 1210000000 HC MED SURG R&B

## 2022-01-26 PROCEDURE — 82947 ASSAY GLUCOSE BLOOD QUANT: CPT

## 2022-01-26 PROCEDURE — 87186 SC STD MICRODIL/AGAR DIL: CPT

## 2022-01-26 PROCEDURE — 87086 URINE CULTURE/COLONY COUNT: CPT

## 2022-01-26 PROCEDURE — 81001 URINALYSIS AUTO W/SCOPE: CPT

## 2022-01-26 RX ORDER — LINEZOLID 600 MG/1
600 TABLET, FILM COATED ORAL EVERY 12 HOURS SCHEDULED
Status: DISCONTINUED | OUTPATIENT
Start: 2022-01-26 | End: 2022-01-27 | Stop reason: HOSPADM

## 2022-01-26 RX ORDER — MEROPENEM AND SODIUM CHLORIDE 1 G/50ML
1000 INJECTION, SOLUTION INTRAVENOUS EVERY 8 HOURS
Status: DISCONTINUED | OUTPATIENT
Start: 2022-01-26 | End: 2022-01-26 | Stop reason: DRUGHIGH

## 2022-01-26 RX ORDER — MEROPENEM AND SODIUM CHLORIDE 1 G/50ML
1000 INJECTION, SOLUTION INTRAVENOUS EVERY 24 HOURS
Status: DISCONTINUED | OUTPATIENT
Start: 2022-01-26 | End: 2022-01-27 | Stop reason: HOSPADM

## 2022-01-26 RX ADMIN — INSULIN LISPRO 3 UNITS: 100 INJECTION, SOLUTION INTRAVENOUS; SUBCUTANEOUS at 17:28

## 2022-01-26 RX ADMIN — HYDROCODONE BITARTRATE AND ACETAMINOPHEN 1 TABLET: 5; 325 TABLET ORAL at 17:41

## 2022-01-26 RX ADMIN — LISINOPRIL 20 MG: 10 TABLET ORAL at 12:36

## 2022-01-26 RX ADMIN — ISOSORBIDE MONONITRATE 120 MG: 60 TABLET, EXTENDED RELEASE ORAL at 12:35

## 2022-01-26 RX ADMIN — MEROPENEM AND SODIUM CHLORIDE 1000 MG: 1 INJECTION, SOLUTION INTRAVENOUS at 21:22

## 2022-01-26 RX ADMIN — DULOXETINE 60 MG: 30 CAPSULE, DELAYED RELEASE ORAL at 12:36

## 2022-01-26 RX ADMIN — SENNOSIDES 8.6 MG: 8.6 TABLET, COATED ORAL at 12:34

## 2022-01-26 RX ADMIN — CYCLOBENZAPRINE 10 MG: 10 TABLET, FILM COATED ORAL at 12:32

## 2022-01-26 RX ADMIN — HYDROCODONE BITARTRATE AND ACETAMINOPHEN 1 TABLET: 5; 325 TABLET ORAL at 00:37

## 2022-01-26 RX ADMIN — LISINOPRIL 20 MG: 10 TABLET ORAL at 21:15

## 2022-01-26 RX ADMIN — ATORVASTATIN CALCIUM 40 MG: 40 TABLET, FILM COATED ORAL at 12:36

## 2022-01-26 RX ADMIN — NIFEDIPINE 90 MG: 90 TABLET, FILM COATED, EXTENDED RELEASE ORAL at 12:34

## 2022-01-26 RX ADMIN — GUAIFENESIN 600 MG: 600 TABLET, EXTENDED RELEASE ORAL at 12:36

## 2022-01-26 RX ADMIN — LACTULOSE 15.33 G: 20 SOLUTION ORAL at 21:15

## 2022-01-26 RX ADMIN — HEPARIN SODIUM 5000 UNITS: 5000 INJECTION INTRAVENOUS; SUBCUTANEOUS at 00:37

## 2022-01-26 RX ADMIN — SEVELAMER CARBONATE 800 MG: 800 TABLET, FILM COATED ORAL at 08:00

## 2022-01-26 RX ADMIN — GUAIFENESIN 600 MG: 600 TABLET, EXTENDED RELEASE ORAL at 21:15

## 2022-01-26 RX ADMIN — EPOETIN ALFA-EPBX 10000 UNITS: 10000 INJECTION, SOLUTION INTRAVENOUS; SUBCUTANEOUS at 12:37

## 2022-01-26 RX ADMIN — HEPARIN SODIUM 5000 UNITS: 5000 INJECTION INTRAVENOUS; SUBCUTANEOUS at 17:42

## 2022-01-26 RX ADMIN — METOPROLOL SUCCINATE 150 MG: 50 TABLET, EXTENDED RELEASE ORAL at 05:59

## 2022-01-26 RX ADMIN — HYDROCODONE BITARTRATE AND ACETAMINOPHEN 1 TABLET: 5; 325 TABLET ORAL at 12:32

## 2022-01-26 RX ADMIN — LACTULOSE 15.33 G: 20 SOLUTION ORAL at 12:32

## 2022-01-26 RX ADMIN — HYDROCODONE BITARTRATE AND ACETAMINOPHEN 1 TABLET: 5; 325 TABLET ORAL at 22:43

## 2022-01-26 RX ADMIN — HYDRALAZINE HYDROCHLORIDE 100 MG: 25 TABLET, FILM COATED ORAL at 17:41

## 2022-01-26 RX ADMIN — HYDRALAZINE HYDROCHLORIDE 100 MG: 25 TABLET, FILM COATED ORAL at 12:34

## 2022-01-26 RX ADMIN — SEVELAMER CARBONATE 800 MG: 800 TABLET, FILM COATED ORAL at 12:32

## 2022-01-26 RX ADMIN — INSULIN GLARGINE 11 UNITS: 100 INJECTION, SOLUTION SUBCUTANEOUS at 23:03

## 2022-01-26 RX ADMIN — LEVOTHYROXINE SODIUM 150 MCG: 0.07 TABLET ORAL at 06:00

## 2022-01-26 RX ADMIN — HYDRALAZINE HYDROCHLORIDE 100 MG: 25 TABLET, FILM COATED ORAL at 00:37

## 2022-01-26 RX ADMIN — CHOLECALCIFEROL TAB 10 MCG (400 UNIT) 400 UNITS: 10 TAB at 12:32

## 2022-01-26 RX ADMIN — HYDROCODONE BITARTRATE AND ACETAMINOPHEN 1 TABLET: 5; 325 TABLET ORAL at 06:00

## 2022-01-26 RX ADMIN — SEVELAMER CARBONATE 800 MG: 800 TABLET, FILM COATED ORAL at 17:41

## 2022-01-26 RX ADMIN — LINEZOLID 600 MG: 600 TABLET, FILM COATED ORAL at 21:16

## 2022-01-26 RX ADMIN — ROPINIROLE HYDROCHLORIDE 4 MG: 4 TABLET, FILM COATED ORAL at 21:15

## 2022-01-26 RX ADMIN — INSULIN LISPRO 4 UNITS: 100 INJECTION, SOLUTION INTRAVENOUS; SUBCUTANEOUS at 17:27

## 2022-01-26 ASSESSMENT — ENCOUNTER SYMPTOMS
COUGH: 0
SORE THROAT: 0
SINUS PAIN: 0
SHORTNESS OF BREATH: 0
WHEEZING: 0
DIARRHEA: 0
VOMITING: 0
CONSTIPATION: 0
BLOOD IN STOOL: 0
ABDOMINAL DISTENTION: 0
TROUBLE SWALLOWING: 0
ABDOMINAL PAIN: 0
NAUSEA: 0

## 2022-01-26 ASSESSMENT — PAIN DESCRIPTION - LOCATION
LOCATION: LEG

## 2022-01-26 ASSESSMENT — PAIN DESCRIPTION - PAIN TYPE
TYPE: ACUTE PAIN

## 2022-01-26 ASSESSMENT — PAIN DESCRIPTION - FREQUENCY
FREQUENCY: INTERMITTENT
FREQUENCY: CONTINUOUS

## 2022-01-26 ASSESSMENT — PAIN SCALES - GENERAL
PAINLEVEL_OUTOF10: 4
PAINLEVEL_OUTOF10: 4
PAINLEVEL_OUTOF10: 6
PAINLEVEL_OUTOF10: 8
PAINLEVEL_OUTOF10: 7
PAINLEVEL_OUTOF10: 5

## 2022-01-26 ASSESSMENT — PAIN DESCRIPTION - DESCRIPTORS
DESCRIPTORS: ACHING;DISCOMFORT;SORE
DESCRIPTORS: ACHING;DISCOMFORT
DESCRIPTORS: ACHING;SORE

## 2022-01-26 ASSESSMENT — PAIN DESCRIPTION - ORIENTATION
ORIENTATION: LEFT

## 2022-01-26 ASSESSMENT — PAIN - FUNCTIONAL ASSESSMENT
PAIN_FUNCTIONAL_ASSESSMENT: PREVENTS OR INTERFERES WITH ALL ACTIVE AND SOME PASSIVE ACTIVITIES

## 2022-01-26 ASSESSMENT — PAIN SCALES - WONG BAKER
WONGBAKER_NUMERICALRESPONSE: 8
WONGBAKER_NUMERICALRESPONSE: 8

## 2022-01-26 ASSESSMENT — PAIN DESCRIPTION - DIRECTION: RADIATING_TOWARDS: NO

## 2022-01-26 NOTE — PROGRESS NOTES
Physical Therapy  Name: Bj Lind  MRN:  [id number]  Date of service:  1/26/2022 01/26/22 1506   Restrictions/Precautions   Restrictions/Precautions Fall Risk;Weight Bearing;Isolation;Contact Precautions   Required Braces or Orthoses? Yes   Lower Extremity Weight Bearing Restrictions   Left Lower Extremity Weight Bearing Non Weight Bearing   Required Braces or Orthoses   Left Lower Extremity Brace Knee Immobilizer   General   Chart Reviewed Yes   Subjective   Subjective Pt ready to get BTB. Pain Screening   Patient Currently in Pain Yes   Pain Assessment   Pain Assessment 0-10   Pain Level 5   Pain Type Acute pain   Pain Location Leg   Pain Orientation Left   Pain Descriptors Aching;Discomfort   Pain Frequency Intermittent   Functional Pain Assessment Prevents or interferes with all active and some passive activities   Non-Pharmaceutical Pain Intervention(s) Ambulation/Increased Activity;Repositioned; Rest   Response to Pain Intervention Patient Satisfied  (once positioned for comfort)   Bed Mobility   Sit to Supine Moderate assistance   Scooting Maximal assistance;2 Person assistance   Transfers   Sit to Stand Moderate Assistance;2 Person Assistance   Stand to sit Moderate Assistance;2 Person Assistance   Stand Pivot Transfers Maximum Assistance;2 Person Assistance   Ambulation   Ambulation? No   WB Status NWB LLE, KI   Short term goals   Time Frame for Short term goals 2 wks   Short term goal 1 supine to sit indep   Short term goal 2 sit to stand indep   Short term goal 3 bed to chair stand pivot transfer CGA with RW, NWB and KI LLE   Short term goal 4 amb. 5' with RW CGA NWB and KI LLE   Conditions Requiring Skilled Therapeutic Intervention   Body structures, Functions, Activity limitations Decreased functional mobility ; Decreased ROM; Decreased strength;Decreased cognition;Decreased sensation;Decreased posture; Increased pain;Decreased balance   Assessment Assisted pt BTB via stand/pivot with rwx, cont to require much assistance d/t pain and weakness. Pt returned to supine and positioned for comfort with all needs in reach.    Activity Tolerance   Activity Tolerance Patient Tolerated treatment well;Patient limited by pain   Safety Devices   Type of devices Bed alarm in place;Call light within reach;Gait belt;Left in bed         Electronically signed by Milagro Sterling PTA on 1/26/2022 at 3:15 PM

## 2022-01-26 NOTE — PROGRESS NOTES
Nephrology ([address]) progress Note      Patient:  Rachele Montenegro  YOB: 1970  Date of Service: 1/26/2022  MRN: [id number]   Acct: [de-identified]   Primary Care Physician: ROMINA Rubi  Advance Directive: Full Code  Admit Date: 1/22/2022       Hospital Day: 4  Referring Provider: Vanessa Chopra MD    Patient independently seen and examined, Chart, Consults, Notes, Operative notes, Labs, Cardiology, and Radiology studies reviewed as available. Subjective:  Rachele Montenegro is a 47 y.o. female for whom we were consulted for evaluation and treatment of end-stage renal disease. Patient had a fall at home from slipping in her bathroom and fell and broke her hip. She is complaining of pain at the site. Recalled no recent issues with dialysis. Denies chest pain, shortness of air, nausea or vomiting. Denies loss of consciousness. She noted no operative plans for fracture management. Today, patient offers no new complaints. Still awaiting final disposition.        Allergies:  Penicillins, Lamisil advanced [terbinafine], Reglan [metoclopramide], and Stadol [butorphanol]    Medicines:  Current Facility-Administered Medications   Medication Dose Route Frequency Provider Last Rate Last Admin    epoetin tish-epbx (RETACRIT) injection 10,000 Units  10,000 Units SubCUTAneous Once per day on Mon Wed Fri Dmitry Boyd MD        cyclobenzaprine (FLEXERIL) tablet 10 mg  10 mg Oral TID PRN ROMINA West CNP   10 mg at 01/25/22 1519    hydrALAZINE (APRESOLINE) injection 10 mg  10 mg IntraVENous Q6H PRN ROMINA West - CNP   10 mg at 01/25/22 1520    ipratropium (ATROVENT) 0.02 % nebulizer solution 0.5 mg  0.5 mg Nebulization 4x Daily PRN ROMINA Howell CNP        cloNIDine (CATAPRES) 0.1 MG/24HR 1 patch  1 patch TransDERmal Weekly ROMINA Frey CNP        DULoxetine (CYMBALTA) extended release capsule 60 mg  60 mg Oral Daily ROMINA Hunter CNP   60 mg at 01/25/22 1202    vitamin D3 (CHOLECALCIFEROL) tablet 400 Units  400 Units Oral Q14 Days ROMINA Hunter CNP   400 Units at 01/22/22 1646    hydrALAZINE (APRESOLINE) tablet 100 mg  100 mg Oral 3 times per day ROMINA Hunter CNP   100 mg at 01/26/22 0037    HYDROcodone-acetaminophen (Quintella Leatha) 5-325 MG per tablet 1 tablet  1 tablet Oral Q4H PRN ROMINA Hunter CNP   1 tablet at 01/26/22 0600    isosorbide mononitrate (IMDUR) extended release tablet 120 mg  120 mg Oral Daily ROMINA Hunter CNP   120 mg at 01/25/22 1202    lactulose (CHRONULAC) 10 GM/15ML solution 15.3333 g  15.3333 g Oral TID ROMINA Hunter CNP   15.3333 g at 01/25/22 2036    levothyroxine (SYNTHROID) tablet 150 mcg  150 mcg Oral Daily ROMINA Hunter - CNP   150 mcg at 01/26/22 0600    lisinopril (PRINIVIL;ZESTRIL) tablet 20 mg  20 mg Oral BID ROMINA Hunter CNP   20 mg at 01/25/22 2036    metoprolol succinate (TOPROL XL) extended release tablet 150 mg  150 mg Oral QAM AC ROMINA Hunter - CNP   150 mg at 01/26/22 0559    nicotine (NICODERM CQ) 14 MG/24HR 1 patch  1 patch TransDERmal Daily ROMINA Hunter CNP   1 patch at 01/25/22 1202    rOPINIRole (REQUIP) tablet 4 mg  4 mg Oral Nightly ROMINA Hunter CNP   4 mg at 01/25/22 2036    senna (SENOKOT) tablet 8.6 mg  1 tablet Oral Daily ROMINA Hunter CNP   8.6 mg at 01/25/22 1202    sevelamer (RENVELA) tablet 800 mg  800 mg Oral TID WC ROMINA Hunter CNP   800 mg at 01/25/22 1802    atorvastatin (LIPITOR) tablet 40 mg  40 mg Oral Daily ROMINA Hunter CNP   40 mg at 01/25/22 1202    glucose (GLUTOSE) 40 % oral gel 15 g  15 g Oral PRN ROMINA Hunter CNP        dextrose 50 % IV solution  12.5 g IntraVENous PRN ROMINA Hunter CNP        glucagon (rDNA) injection 1 mg  1 mg IntraMUSCular PRN Schleicher Arts, APRN - CNP        dextrose 5 % solution  100 mL/hr IntraVENous PRN Schleicher Arts, APRN - CNP        sodium chloride flush 0.9 % injection 5-40 mL  5-40 mL IntraVENous 2 times per day Aristeo Arts, APRN - CNP   10 mL at 01/25/22 2036    sodium chloride flush 0.9 % injection 5-40 mL  5-40 mL IntraVENous PRN Schleicher Arts, APRN - CNP        0.9 % sodium chloride infusion  25 mL IntraVENous PRN Schleicher Arts, APRN - CNP        ondansetron (ZOFRAN-ODT) disintegrating tablet 4 mg  4 mg Oral Q8H PRN Schleicher Arts, APRN - CNP        Or    ondansetron (ZOFRAN) injection 4 mg  4 mg IntraVENous Q6H PRN Schleicher Arts, APRN - CNP   4 mg at 01/25/22 2059    polyethylene glycol (GLYCOLAX) packet 17 g  17 g Oral Daily PRN Schleicher Arts, APRN - CNP        acetaminophen (TYLENOL) tablet 650 mg  650 mg Oral Q6H PRN Schleicher Arts, APRN - CNP        Or    acetaminophen (TYLENOL) suppository 650 mg  650 mg Rectal Q6H PRN Schleicher Arts, APRN - CNP        aluminum & magnesium hydroxide-simethicone (MAALOX) 200-200-20 MG/5ML suspension 30 mL  30 mL Oral Q6H PRN Schleicher Arts, APRN - CNP        heparin (porcine) injection 5,000 Units  5,000 Units SubCUTAneous 3 times per day Aristeo Arts, APRN - CNP   5,000 Units at 01/26/22 0037    guaiFENesin (MUCINEX) extended release tablet 600 mg  600 mg Oral BID Aristeo Arts, APRN - CNP   600 mg at 01/25/22 2036    insulin glargine (LANTUS) injection vial 11 Units  0.15 Units/kg SubCUTAneous Nightly ROMINA Frey CNP   11 Units at 01/25/22 2036    insulin lispro (HUMALOG) injection vial 4 Units  0.05 Units/kg SubCUTAneous TID  Schleicher Arts, APRDAVI - CNP   4 Units at 01/25/22 1800    insulin lispro (HUMALOG) injection vial 0-18 Units  0-18 Units SubCUTAneous TID  ROIMNA Frey CNP   6 Units at 01/25/22 1800    insulin lispro (HUMALOG) injection vial 0-9 Units  0-9 Units SubCUTAneous Nightly ROMINA Hughes - CNP   3 Units at 01/23/22 2115    albuterol (PROVENTIL) nebulizer solution 2.5 mg  2.5 mg Nebulization Q4H PRN ROMINA Hughes - CNP        NIFEdipine (PROCARDIA XL) extended release tablet 90 mg  90 mg Oral Daily ROMINA Hughes - CNP   90 mg at 01/25/22 1202       Past Medical History:  Past Medical History:   Diagnosis Date    Arthritis     Chronic kidney disease, stage 5, kidney failure ([address]     Closed fracture of right shoulder girdle, with routine healing, subsequent encounter     DM (diabetes mellitus) type II controlled with renal manifestation ([facility] 06/1993    Gastroparesis     GERD (gastroesophageal reflux disease)     Hemodialysis patient ([facility]     dialysis on tues, thur, and sat at [facility]    Hypertension     Hypothyroid     Nasal congestion     recent    Neuropathy     Noncompliance with medications     Palliative care patient 10/08/2019    Restless leg syndrome        Past Surgical History:  Past Surgical History:   Procedure Laterality Date    BREAST SURGERY Left 2008    infected milk gland removed    BREAST SURGERY Right 5/25/2021    WEDGE EXCISION RIGHT BREAST DUCT WITH LACRIMAL DUCT PROBE, PEC BLOCK performed by Sriram Mustafa MD at [address] [facility], LAPAROSCOPIC      2008    COLONOSCOPY Left 9/17/2020    Dr Su [place] hepatic flexure-Severe tortuosity with severe spasming, 1 yr recall    DIALYSIS FISTULA CREATION Left 1/25/2019    REVISION LEFT UPPER EXTREMITY AV ACCESS WITH LEFT BRACHIAL ARTERY TO LEFT AXILLARY VEIN WITH  INTERPOSITIONAL ARTEGRAFT   performed by Ayan Hightower MD at [address]  2012    [address] Right 1/25/2019    INSERTION OF RIGHT INTERNAL JUGULAR HEMODIALYSIS CATHETER performed by Ayan Hightower MD at [address] [facility]  08/17/2018    1.  Moderately increased stainable iron identified by special stain in Kupffer cells and occasional hepatocytes. Negative for evidence of significant portal or lobular inflammation. Negative for evidence of significant fibrosis    RI COLONOSCOPY W/BIOPSY SINGLE/MULTIPLE N/A 10/20/2017    Dr Derek Chow prep-Tubular AP (-) dysplasia x 2--3 yr recall    RI EGD TRANSORAL BIOPSY SINGLE/MULTIPLE N/A 2016    Dr Harleen Christine EGD TRANSORAL BIOPSY SINGLE/MULTIPLE N/A 10/20/2017    Dr Aurelio Peña (-)   [address] [facility] Left 2016    fistula by Dr Andrew Head at [address]  10/02/2017    [facility]. Left upper fistulograms/venograms, balloon angioplasty cephalic vein arch [id number] conquest.    VASCULAR SURGERY  2018    FISTULOGRAM    VASCULAR SURGERY  10/29/2018    [facility]. Left upper fistulograms/venograms    VASCULAR SURGERY  2018    [facility]. Arch aortogram,left upper arteriograms.  VASCULAR SURGERY  2019    [facility]. Removal of tunneled dialyis catheter right internal jugular vein.  VASCULAR SURGERY  2019    [facility]. Left upper extremity fistulogram including venography to the superior vena cava. Balloon angioplasty left subclavian vein with 10mm x 40mm conquest balloon. Balloon angioplasty mid/proximal upper arm cephalic vein with 45FW x 40mm conquest balloon. Venograms after balloon angioplasty. Stent left mid/proximal upper arm cephalic vein stenosis fluency 10mm x 60mm self-expanding covered stent. Balloon     VASCULAR SURGERY  2019    cont angioplasty stent with 10mm x 40mm conquest balloon. Completion venograms left upper extremity.        Family History  Family History   Problem Relation Age of Onset    Colon Cancer Mother          at 63    Colon Polyps Mother     Lung Cancer Father          at 79    Colon Polyps Father     Stomach Cancer Sister     Breast Cancer Sister 27    Depression Daughter 25        committed suicide    Liver Cancer Neg Hx     Liver Disease Neg Hx     Esophageal Cancer Neg Hx     Rectal Cancer Neg Hx Social History  Social History     Socioeconomic History    Marital status: Single     Spouse name: Not on file    Number of children: 2    Years of education: Not on file    Highest education level: Not on file   Occupational History    Not on file   Tobacco Use    Smoking status: Current Every Day Smoker     Packs/day: 0.50     Years: 33.00     Pack years: 16.50     Types: Cigarettes    Smokeless tobacco: Never Used    Tobacco comment: NOW at 1/4 PD, started at age 24 and has averaged 2 PPD   Vaping Use    Vaping Use: Never used   Substance and Sexual Activity    Alcohol use: No    Drug use: Not Currently     Types: Marijuana [facility])    Sexual activity: Not Currently     Partners: Male   Other Topics Concern    Not on file   Social History Narrative    Not on file     Social Determinants of Health     Financial Resource Strain:     Difficulty of Paying Living Expenses: Not on file   Food Insecurity:     Worried About Running Out of Food in the Last Year: Not on file    Kendy of Food in the Last Year: Not on file   Transportation Needs:     Lack of Transportation (Medical): Not on file    Lack of Transportation (Non-Medical):  Not on file   Physical Activity:     Days of Exercise per Week: Not on file    Minutes of Exercise per Session: Not on file   Stress:     Feeling of Stress : Not on file   Social Connections:     Frequency of Communication with Friends and Family: Not on file    Frequency of Social Gatherings with Friends and Family: Not on file    Attends Worship Services: Not on file    Active Member of Clubs or Organizations: Not on file    Attends Club or Organization Meetings: Not on file    Marital Status: Not on file   Intimate Partner Violence:     Fear of Current or Ex-Partner: Not on file    Emotionally Abused: Not on file    Physically Abused: Not on file    Sexually Abused: Not on file   Housing Stability:     Unable to Pay for Housing in the Last Year: Not on file    Number of Places Lived in the Last Year: Not on file    Unstable Housing in the Last Year: Not on file         Review of Systems:  History obtained from chart review and the patient  General ROS: No fever or chills  Respiratory ROS: No cough, shortness of breath, wheezing  Cardiovascular ROS: No chest pain or palpitations  Gastrointestinal ROS: No abdominal pain or melena  Genito-Urinary ROS: No dysuria or hematuria  Musculoskeletal ROS: Complaining hip pain  14 point ROS reviewed with the patient and negative except as noted above and in the HPI unless unable to obtain. Objective:  Patient Vitals for the past 24 hrs:   BP Temp Temp src Pulse Resp SpO2   01/26/22 0756 133/70 97.5 °F (36.4 °C) Temporal 70 18 94 %   01/26/22 0410 138/67 99 °F (37.2 °C) Temporal 82 18 92 %   01/25/22 2150 (!) 169/72 99.9 °F (37.7 °C) Temporal 83 18 95 %   01/25/22 1624 (!) 195/74 98.4 °F (36.9 °C) Temporal 83 20 94 %   01/25/22 1445 (!) 190/74 98.4 °F (36.9 °C) Temporal 80 16 96 %   01/25/22 1200 (!) 196/70   73 18        Intake/Output Summary (Last 24 hours) at 1/26/2022 1053  Last data filed at 1/26/2022 0605  Gross per 24 hour   Intake 600 ml   Output 50 ml   Net 550 ml     General: awake/alert   Chest:  clear to auscultation bilaterally  CVS: regular rate and rhythm  Abdominal: soft, nontender, normal bowel sounds  Extremities: no cyanosis or edema  Skin: warm and dry without rash      Labs:  BMP:   Recent Labs     01/24/22 0456 01/25/22 0422 01/26/22 0252   * 136 139   K 4.1 4.8 5.0   CL 91* 93* 97*   CO2 25 22 23   BUN 44* 58* 32*   CREATININE 5.0* 5.9* 4.5*   CALCIUM 8.7 8.7 8.7     CBC:   Recent Labs     01/24/22 0456 01/25/22  0422 01/26/22  0252   WBC 6.5 5.5 5.2   HGB 7.7* 7.6* 8.5*   HCT 25.5* 24.8* 27.8*   MCV 99.6* 97.6 98.6   PLT 95* 86* 81*     LIVER PROFILE:   No results for input(s): AST, ALT, LIPASE, BILIDIR, BILITOT, ALKPHOS in the last 72 hours. Invalid input(s):   AMYLASE, ALB  PT/INR: No results for input(s): PROTIME, INR in the last 72 hours. APTT: No results for input(s): APTT in the last 72 hours. BNP:  No results for input(s): BNP in the last 72 hours. Ionized Calcium:No results for input(s): IONCA in the last 72 hours. Magnesium:No results for input(s): MG in the last 72 hours. Phosphorus:No results for input(s): PHOS in the last 72 hours. HgbA1C:   No results for input(s): LABA1C in the last 72 hours. Hepatic:   No results for input(s): ALKPHOS, ALT, AST, PROT, BILITOT, BILIDIR, LABALBU in the last 72 hours. Lactic Acid: No results for input(s): LACTA in the last 72 hours. Troponin: No results for input(s): CKTOTAL, CKMB, TROPONINT in the last 72 hours. ABGs: No results for input(s): PH, PCO2, PO2, HCO3, O2SAT in the last 72 hours. CRP:  No results for input(s): CRP in the last 72 hours. Sed Rate:  No results for input(s): SEDRATE in the last 72 hours. Cultures:   No results for input(s): CULTURE in the last 72 hours. No results for input(s): BC, [facility] in the last 72 hours. No results for input(s): CXSURG in the last 72 hours. Radiology reports as per the Radiologist  Radiology: XR PELVIS (1-2 VIEWS)    Result Date: 1/22/2022  Exam:   XR PELVIS (1-2 VIEWS)  Date:  1/22/2022 History:  Female, age  47 years; fall COMPARISON:  None. Findings : There is no acute displaced fracture. No dislocation. No suspicious lytic or blastic lesion. No radiopaque foreign body. Impression: No acute osseous pathology. Signed by Dr Cherelle Peralta    XR FEMUR LEFT (MIN 2 VIEWS)    Result Date: 1/22/2022  Exam:   XR FEMUR LEFT (MIN 2 VIEWS), XR KNEE LEFT (1-2 VIEWS)  Date:  1/22/2022 History:  Female, age  47 years; fall COMPARISON:  None. Findings : Demineralized skeleton limits bony details. Linear lucency in the distal femur extending through the posterior cortex, involving the distal diaphysis, is consistent with an acute nondisplaced fracture.  There is no dislocation. Impression: Acute nondisplaced fracture through the distal femoral diaphysis. Signed by Dr Howard Cade    XR KNEE LEFT (1-2 VIEWS)    Result Date: 1/22/2022  Exam:   XR FEMUR LEFT (MIN 2 VIEWS), XR KNEE LEFT (1-2 VIEWS)  Date:  1/22/2022 History:  Female, age  47 years; fall COMPARISON:  None. Findings : Demineralized skeleton limits bony details. Linear lucency in the distal femur extending through the posterior cortex, involving the distal diaphysis, is consistent with an acute nondisplaced fracture. There is no dislocation. Impression: Acute nondisplaced fracture through the distal femoral diaphysis. Signed by Dr Howard Cade    XR FOOT LEFT (2 VIEWS)    Result Date: 1/22/2022  Exam:   XR FOOT LEFT (2 VIEWS)  Date:  1/22/2022 History:  Female, age  47 years; fall COMPARISON:  None. Findings : There is no acute displaced fracture. No dislocation. No suspicious lytic or blastic lesion. No radiopaque foreign body. Vascular calcifications. Impression: No acute osseous pathology. Signed by Dr Howard Cade    XR CHEST PORTABLE    Result Date: 1/22/2022  Exam:   XR CHEST PORTABLE  Date:  1/22/2022 History:  Female, age  47 years; preop COMPARISON:  Chest x-ray dated January 10, 2022 Findings : Mild cardiomegaly. Central pulmonary venous congestion and coarsened interstitium. Right infrahilar opacity. The bones show no acute pathology. Vascular stent projecting over the left axilla and thoracic inlet. Impression: Cardiomegaly and interstitial coarsening, concerning for fluid overload. Right infrahilar opacity, may reflect atelectasis, aspiration or infection.  Signed by Dr Rigoberto Truong   -ESRD  -DM2 with nephropathy on long-term insulin, uncontrolled  -HTN  -Hyponatremia  -Hypokalemia  -Secondary Hyperparathyroidism  -Anemia CKD  -Seizure disorder  -Hypothyroidism   -S/p fall and Acute nondisplaced fracture through the distal femoral diaphysis      Plan:  Discussed with patient  HD 1/27 per routine scheduling  Monitor labs  Pain management per primary service  Low potassium diet       Leigh Nunes MD  01/26/22  10:53 AM

## 2022-01-26 NOTE — PROGRESS NOTES
Pharmacy Renal Adjustment    Lashonda Malhotra is a 47 y.o. female. Pharmacy has renally adjusted Merrem per protocol. Recent Labs     01/25/22  0422 01/26/22  0252   BUN 58* 32*       Recent Labs     01/25/22  0422 01/26/22  0252   CREATININE 5.9* 4.5*       Estimated Creatinine Clearance: 14 mL/min (A) (based on SCr of 4.5 mg/dL (H)). Height:   Ht Readings from Last 1 Encounters:   01/22/22 5' 6\" (1.676 m)     Weight:  Wt Readings from Last 1 Encounters:   01/25/22 144 lb (65.3 kg)       CKD stage: ESRD HD         Plan: Adjust Merrem to 1 g IV q24h. Give after HD on dialysis days.     Electronically signed by Jamie Garza RPh, STEVEN, 1/26/2022,5:58 PM

## 2022-01-26 NOTE — PROGRESS NOTES
[facility]      Progress Note    Patient:  Chrissy Willoughby  YOB: 1970  Date of Service: 1/26/2022  MRN: [id number]   Acct: [de-identified]   Primary Care Physician: ROMINA Rodrigues  Advance Directive: Full Code  Admit Date: 1/22/2022       Hospital Day: 4    Portions of this note have been copied forward, however, updated to reflect the most current clinical status of this patient. CHIEF COMPLAINT Left Leg pain after a fall at home     SUBJECTIVE:  Ms. Husseni Lozano was resting comfortably in bed this morning. Reported left lower extremity spasm at times. CUMULATIVE HOSPITAL COURSE:   The patient is a 49 y. o. female with PMH of ESRD on HD, DM, HTN, restless leg syndrome, and arthritis who presented to [facility] ED complaining of left leg pain after a fall at home. Ms. Hussein Lozano reported she was getting ready for dialysis on day of admission when she fell. Reported again \"a pop\" in her left knee when she fell. Denied head injury or LOC. Work-up in ED revealed acute nondisplaced fracture through the distal femoral diaphysis of the left femur. Chest x-ray indicated cardiomegaly and interstitial coarsening, concerning for fluid overload, right infrahilar opacity, may reflect atelectasis, aspiration, infection. BG greater than 500, SBP over 200. BP and Glucose stabilized in ED. Patient was admitted to hospital medicine for closed fracture of the left distal femur with orthopedic surgery consultation, hypoglycemia, and hypertensive urgency. Home BP medications resumed, BP now within acceptable limits. Resumed home Lantus and prandial insulin, glucose now stabilized. Patient was evaluated by orthopedic surgery whom recommended nonsurgical management at this time with knee immobilizer in place and nonweightbearing on the LLE for 12 weeks. Case management assisting with SNF placement. Review of Systems   Constitutional: Negative for chills, diaphoresis, fatigue and fever.    HENT: Negative for heard.  No friction rub. No gallop. Pulmonary:      Effort: Pulmonary effort is normal. No respiratory distress. Breath sounds: Normal breath sounds. No wheezing, rhonchi or rales. Abdominal:      General: Abdomen is flat. Bowel sounds are normal. There is no distension. Palpations: Abdomen is soft. Tenderness: There is abdominal tenderness. Musculoskeletal:         General: No swelling. Normal range of motion. Cervical back: Normal range of motion and neck supple. Right lower leg: No edema. Left lower leg: No edema. Comments: Knee immobilizer in place to LLE     Skin:     General: Skin is warm and dry. Coloration: Skin is not jaundiced. Findings: No erythema, lesion or rash. Neurological:      General: No focal deficit present. Mental Status: She is alert and oriented to person, place, and time. Mental status is at baseline. Cranial Nerves: No cranial nerve deficit. Sensory: No sensory deficit. Motor: No weakness. Psychiatric:         Mood and Affect: Mood normal.         Behavior: Behavior normal.         Thought Content:  Thought content normal.         Judgment: Judgment normal.            Medications:      dextrose      sodium chloride        epoetin tish-epbx  10,000 Units SubCUTAneous Once per day on Mon Wed Fri    cloNIDine  1 patch TransDERmal Weekly    DULoxetine  60 mg Oral Daily    vitamin D3  400 Units Oral Q14 Days    hydrALAZINE  100 mg Oral 3 times per day    isosorbide mononitrate  120 mg Oral Daily    lactulose  15.3333 g Oral TID    levothyroxine  150 mcg Oral Daily    lisinopril  20 mg Oral BID    metoprolol succinate  150 mg Oral QAM AC    nicotine  1 patch TransDERmal Daily    rOPINIRole  4 mg Oral Nightly    senna  1 tablet Oral Daily    sevelamer  800 mg Oral TID WC    atorvastatin  40 mg Oral Daily    sodium chloride flush  5-40 mL IntraVENous 2 times per day    heparin (porcine)  5,000 Units SubCUTAneous 3 times per day    guaiFENesin  600 mg Oral BID    insulin glargine  0.15 Units/kg SubCUTAneous Nightly    insulin lispro  0.05 Units/kg SubCUTAneous TID WC    insulin lispro  0-18 Units SubCUTAneous TID WC    insulin lispro  0-9 Units SubCUTAneous Nightly    NIFEdipine  90 mg Oral Daily     cyclobenzaprine, hydrALAZINE, ipratropium, HYDROcodone-acetaminophen, glucose, dextrose, glucagon (rDNA), dextrose, sodium chloride flush, sodium chloride, ondansetron **OR** ondansetron, polyethylene glycol, acetaminophen **OR** acetaminophen, aluminum & magnesium hydroxide-simethicone, albuterol  ADULT DIET; Easy to Chew; 4 carb choices (60 gm/meal); Low Fat/Low Chol/High Fiber/LUIS; Low Sodium (2 gm); Low Potassium (Less than 3000 mg/day); Low Phosphorus (Less than 1000 mg); 60 to 80 gm     Lab and other Data:     Recent Labs     01/24/22 0456 01/25/22 0422 01/26/22  0252   WBC 6.5 5.5 5.2   HGB 7.7* 7.6* 8.5*   PLT 95* 86* 81*     Recent Labs     01/24/22 0456 01/25/22 0422 01/26/22  0252   * 136 139   K 4.1 4.8 5.0   CL 91* 93* 97*   CO2 25 22 23   BUN 44* 58* 32*   CREATININE 5.0* 5.9* 4.5*   GLUCOSE 129* 64* 104       RAD:     XR PELVIS (1-2 VIEWS)  Result Date: 1/22/2022    Impression: No acute osseous pathology. Signed by Dr Meg Reid      XR FEMUR LEFT (MIN 2 VIEWS)  Result Date: 1/22/2022    Impression: Acute nondisplaced fracture through the distal femoral diaphysis. Signed by Dr Meg Reid      XR KNEE LEFT (1-2 VIEWS)  Result Date: 1/22/2022    Impression: Acute nondisplaced fracture through the distal femoral diaphysis. Signed by Dr Meg Reid      XR FOOT LEFT (2 VIEWS)  Result Date: 1/22/2022    Impression: No acute osseous pathology. Signed by Dr Meg Reid      XR CHEST PORTABLE  Result Date: 1/22/2022    Impression: Cardiomegaly and interstitial coarsening, concerning for fluid overload. Right infrahilar opacity, may reflect atelectasis, aspiration or infection. Signed by Dr Holman Jose:    Ángel Ruano  [[id number]] Collected: 01/22/22 0627   Order Status: Completed Specimen: Nasopharyngeal Swab Updated: 01/22/22 0650    SARS-CoV-2, NAAT Not Detected     Culture, Blood 2 [[id number]] Collected: 01/22/22 1458   Order Status: Completed Specimen: Blood Updated: 01/23/22 1714    Culture, Blood 2 No Growth to date.  Any change in status will be called. Assessment/Plan   Principal Problem:    Closed fracture of left distal femur (HCC)  Active Problems:    Anemia in chronic kidney disease (CKD)    Noncompliance of patient with renal dialysis ([address]    Uncontrolled type 2 diabetes mellitus with complication, with long-term current use of insulin ([address]    ESRD (end stage renal disease) on dialysis ([address]    Hyperglycemia due to type 2 diabetes mellitus ([address]    Uncontrolled hypertension    Medically noncompliant    Closed fracture of distal end of left femur, initial encounter ([facility]  Resolved Problems:    * No resolved hospital problems.  *      Principal Problem:    Closed fracture of left distal femur ([address]-    - Assessed by Orthopedic surgery with recommendations of non-surgical management at this time    - PT/OT per ortho recommendations    - Case management assisting with SNF placement    - Knee immobilizer in place    - Pain control   - Anti spasmatic as needed          Active Problems:    Anemia in chronic kidney disease (CKD)-    - monitor labs closely       Noncompliance of patient with renal dialysis ([address]- noted      Hyperglycemia due to type 2 diabetes mellitus (HCC)/ Uncontrolled type 2 diabetes mellitus with complication, with long-term current use of insulin ([address]- continue Lantus, SSI, Accuchecks, hypoglycemia treatment protocol in place       ESRD (end stage renal disease) on dialysis [facility])- nephrology managing HD       Uncontrolled hypertension- stable at this time, continue current medications, as needed hydralazine        Medically noncompliant- noted              DVT Prophylaxis: Heparin SubQ    Discharge planning: Case management working on placement      Further Orders per Clinical course/attending. Electronically signed by ROMINA Al CNP on 1/26/2022 at 3:46 PM       EMR Dragon/Transcription disclaimer:   Much of this encounter note is an electronic transcription/translation of spoken language to printed text.  The electronic translation of spoken language may permit erroneous, or at times, nonsensical words or phrases to be inadvertently transcribed; although attempts have made to review the note for such errors, some may still exist.

## 2022-01-26 NOTE — PROGRESS NOTES
Occupational Therapy  Facility/Department: [facility] SURG SERVICES  Daily Treatment Note  NAME: Andi Real  : 1970  MRN: [id number]    Date of Service: 2022    Discharge Recommendations:  24 hour supervision or assist,Patient would benefit from continued therapy after discharge       Assessment   Assessment: Pt.requested to return to bed. Pt. was able to perform sit to stand from recliner with modAX2 with RW.Pt. was able to perform stand to sit to EOB with modAx2 with RWPt. would benefit from continued skilled OT services. Decision Making: Medium Complexity  REQUIRES OT FOLLOW UP: Yes  Activity Tolerance  Activity Tolerance: Patient Tolerated treatment well  Activity Tolerance: Pt does have high levels of pain, but does try to participate in all suggested activities. Safety Devices  Safety Devices in place: Yes  Type of devices: Call light within reach; Left in bed;Bed alarm in place         Patient Diagnosis(es): The primary encounter diagnosis was Closed fracture of left femur, unspecified fracture morphology, unspecified portion of femur, initial encounter ([facility] Diagnoses of Essential hypertension and Hyperglycemia were also pertinent to this visit. has a past medical history of Arthritis, Chronic kidney disease, stage 5, kidney failure ([facility] [facility] Closed fracture of right shoulder girdle, with routine healing, subsequent encounter, DM (diabetes mellitus) type II controlled with renal manifestation ([facility] Gastroparesis, GERD (gastroesophageal reflux disease), Hemodialysis patient ([facility] Hypertension, Hypothyroid, Nasal congestion, Neuropathy, Noncompliance with medications, Palliative care patient, and Restless leg syndrome. has a past surgical history that includes Tubal ligation; pr egd transoral biopsy single/multiple (N/A, 2016); Endometrial ablation (); pr colonoscopy w/biopsy single/multiple (N/A, 10/20/2017); pr egd transoral biopsy single/multiple (N/A, 10/20/2017);  Dialysis fistula creation (Left, 1/25/2019); hc dialysis catheter (Right, 1/25/2019); Cholecystectomy, laparoscopic; liver biopsy (08/17/2018); vascular surgery (Left, 2016); vascular surgery (10/02/2017); vascular surgery (08/2018); vascular surgery (10/29/2018); vascular surgery (12/19/2018); vascular surgery (03/06/2019); vascular surgery (08/28/2019); vascular surgery (08/28/2019); Colonoscopy (Left, 9/17/2020); Breast surgery (Left, 2008); and Breast surgery (Right, 5/25/2021). Restrictions  Restrictions/Precautions  Restrictions/Precautions: Fall Risk,Weight Bearing,Isolation,Contact Precautions (VRE)  Required Braces or Orthoses?: Yes  Lower Extremity Weight Bearing Restrictions  Left Lower Extremity Weight Bearing: Non Weight Bearing  Required Braces or Orthoses  Left Lower Extremity Brace: Knee Immobilizer  Position Activity Restriction  Other position/activity restrictions: TREAT NON-OP WITH IMMOBILZER AND NWB FOR UP TO 3 MTHS  Subjective   General  Chart Reviewed: Yes  Patient assessed for rehabilitation services?: Yes  Additional Pertinent Hx: arthritis; DM; HD pt; neuropathy; non-compliance; RLS; palliative care  Family / Caregiver Present: No  Diagnosis: (L) distal femur fx (non-op)      Orientation     Objective             Balance  Sitting Balance: Supervision  Standing Balance: Moderate assistance  Functional Mobility  Functional - Mobility Device: Rolling Walker  Bed mobility  Supine to Sit: 2 Person assistance; Moderate assistance  Sit to Supine: Moderate assistance;2 Person assistance  Transfers  Stand Pivot Transfers: Maximum assistance;2 Person assistance  Sit to stand: 2 Person assistance; Moderate assistance  Stand to sit: Moderate assistance;2 Person assistance  Transfer Comments: used r/w                                                           Plan   Plan  Times per week: 3-5  Specific instructions for Next Treatment: transfers with PT; standing ADLs  Current Treatment Recommendations: Strengthening,Wheelchair Mobility Training,Positioning,Pain Management,ROM,Safety Education & Training,Balance Training,Patient/Caregiver Education & Training,Self-Care / ADL,Cognitive/Perceptual Training,Functional Mobility Training,Equipment Evaluation, Education, & procurement,Home Management Training,Endurance Training  Goals  Short term goals  Time Frame for Short term goals: 1 week  Short term goal 1: Complete transfers with CGA and DME. Short term goal 2: Perform static, standing ADL with unilateral support and CGA for 5 min. Short term goal 3: Complete LBD with min A and AE/DME. Short term goal 4: Complete toileting skills with CGA and DME. Short term goal 5: Pt/family will verbalize/demo: AE/DME options; LLE NWB precautions; knee immobilizer use; recommended therapeutic activities; homemaking/IADL strategies; energy conservation techniques; and fall prevention strategies. Long term goals  Long term goal 1: Progress as tolerated.        Therapy Time   Individual Concurrent Group Co-treatment   Time In           Time Out           Minutes              Electronically signed by LANG Damon Student on 1/26/2022 at 2:45 PM

## 2022-01-26 NOTE — PROGRESS NOTES
Physical Therapy  Name: Andi Real  MRN:  [id number]  Date of service:  1/26/2022 01/26/22 1123   Restrictions/Precautions   Restrictions/Precautions Fall Risk;Weight Bearing;Isolation;Contact Precautions   Required Braces or Orthoses? Yes   Lower Extremity Weight Bearing Restrictions   Left Lower Extremity Weight Bearing Non Weight Bearing   Required Braces or Orthoses   Left Lower Extremity Brace Knee Immobilizer   General   Chart Reviewed Yes   Subjective   Subjective Pt agrees to work with therapy. Pain Screening   Patient Currently in Pain Yes   Pain Assessment   Pain Assessment Faces   Pain Type Acute pain   Pain Location Leg   Pain Orientation Left   Pain Descriptors Aching; Sore   Functional Pain Assessment Prevents or interferes with all active and some passive activities   Non-Pharmaceutical Pain Intervention(s) Ambulation/Increased Activity;Repositioned; Rest   Response to Pain Intervention Patient Satisfied  (once positioned for comfort)   Monroe-Baker Pain Rating 8   Bed Mobility   Supine to Sit Moderate assistance  (x2)   Sit to Supine   (x2)   Transfers   Sit to Stand Moderate Assistance;2 Person Assistance   Stand to sit Moderate Assistance;2 Person Assistance   Stand Pivot Transfers Maximum Assistance;2 Person Assistance   Ambulation   Ambulation? No   WB Status NWB LLE, KI   Short term goals   Time Frame for Short term goals 2 wks   Short term goal 1 supine to sit indep   Short term goal 2 sit to stand indep   Short term goal 3 bed to chair stand pivot transfer CGA with RW, NWB and KI LLE   Short term goal 4 amb. 5' with RW CGA NWB and KI LLE   Conditions Requiring Skilled Therapeutic Intervention   Body structures, Functions, Activity limitations Decreased functional mobility ; Decreased ROM; Decreased strength;Decreased cognition;Decreased sensation;Decreased posture; Increased pain;Decreased balance   Assessment Pt cont to require assist for all aspects of mobility, able to stand from bedside and pivot to recliner with maxA x2 and rwx. Pt positioned for comfort with all needs in reach. Activity Tolerance   Activity Tolerance Patient Tolerated treatment well;Patient limited by pain   Safety Devices   Type of devices Call light within reach; Chair alarm in place;Gait belt;Left in chair         Electronically signed by Milagro Sterling PTA on 1/26/2022 at 11:26 AM

## 2022-01-26 NOTE — PROGRESS NOTES
Facility/Department: [facility] SURG SERVICES  Daily Treatment Note  NAME: Jeanna Medina  : 1970  MRN: [id number]    Date of Service: 2022    Discharge Recommendations:  24 hour supervision or assist,Patient would benefit from continued therapy after discharge       Assessment   Assessment: OT treatment completed this date with no reports of adverse symptoms post-session. An increase in pain (and its effect on therapy/movement) is present. OT/PT verified that pt had received pain medication before tx session with extra time to take effect (1 hr). Pt continues to benefit from continued skilled services. REQUIRES OT FOLLOW UP: Yes  Activity Tolerance  Activity Tolerance: Patient Tolerated treatment well  Activity Tolerance: Pt does have high levels of pain, but does try to participate in all suggested activities. Safety Devices  Safety Devices in place: Yes  Type of devices: Call light within reach; Chair alarm in place; Left in chair         Patient Diagnosis(es): The primary encounter diagnosis was Closed fracture of left femur, unspecified fracture morphology, unspecified portion of femur, initial encounter ([facility] Diagnoses of Essential hypertension and Hyperglycemia were also pertinent to this visit. has a past medical history of Arthritis, Chronic kidney disease, stage 5, kidney failure ([address] Closed fracture of right shoulder girdle, with routine healing, subsequent encounter, DM (diabetes mellitus) type II controlled with renal manifestation ([address] Gastroparesis, GERD (gastroesophageal reflux disease), Hemodialysis patient ([facility] Hypertension, Hypothyroid, Nasal congestion, Neuropathy, Noncompliance with medications, Palliative care patient, and Restless leg syndrome. has a past surgical history that includes Tubal ligation; pr egd transoral biopsy single/multiple (N/A, 2016);  Endometrial ablation (); pr colonoscopy w/biopsy single/multiple (N/A, 10/20/2017); pr egd transoral biopsy [employer] message sent to patient notifying her that we have not yet received results. Once we do we will notify her as soon as possible.         From: Brando Saldana  To: Erik Ohara MD  Sent: 5/11/2021  9:14 AM CDT  Subject: Non-Urgent Medical Harlin Fairly single/multiple (N/A, 10/20/2017); Dialysis fistula creation (Left, 1/25/2019); hc dialysis catheter (Right, 1/25/2019); Cholecystectomy, laparoscopic; liver biopsy (08/17/2018); vascular surgery (Left, 2016); vascular surgery (10/02/2017); vascular surgery (08/2018); vascular surgery (10/29/2018); vascular surgery (12/19/2018); vascular surgery (03/06/2019); vascular surgery (08/28/2019); vascular surgery (08/28/2019); Colonoscopy (Left, 9/17/2020); Breast surgery (Left, 2008); and Breast surgery (Right, 5/25/2021). Restrictions  Restrictions/Precautions  Restrictions/Precautions: Fall Risk,Weight Bearing,Isolation,Contact Precautions (VRE)  Required Braces or Orthoses?: Yes  Lower Extremity Weight Bearing Restrictions  Left Lower Extremity Weight Bearing: Non Weight Bearing  Required Braces or Orthoses  Left Lower Extremity Brace: Knee Immobilizer  Position Activity Restriction  Other position/activity restrictions: TREAT NON-OP WITH IMMOBILZER AND NWB FOR UP TO 3 MTHS    Subjective   General  Chart Reviewed: Yes  Patient assessed for rehabilitation services?: Yes  Additional Pertinent Hx: arthritis; DM; HD pt; neuropathy; non-compliance; RLS; palliative care  Family / Caregiver Present: No  Diagnosis: (L) distal femur fx (non-op)  Pain Assessment  Pain Assessment: Faces  Monroe-Baker Pain Rating: Hurts whole lot  Pain Type: Acute pain  Pain Location: Leg  Pain Orientation: Left  Pain Descriptors: Aching;Discomfort; Sore  Pain Frequency: Continuous (worsened by movement)  Functional Pain Assessment: Prevents or interferes with all active and some passive activities  Non-Pharmaceutical Pain Intervention(s): Ambulation/Increased Activity;Repositioned;Elevation  Response to Pain Intervention: Patient Satisfied (once positioned for comfort; did not notify nsg d/t knowledge over medicine schedule (last dose 10 AM))  Vital Signs  Patient Currently in Pain: Yes     Objective    ADL  Grooming: Independent;Setup  UE Bathing: Setup;Stand by assistance (seated in recliner (anterior portion))  Additional Comments: (anterior maximilian-hygiene) in recliner, independent with set-up        Balance  Sitting Balance: Supervision  Standing Balance: Moderate assistance  Bed mobility  Supine to Sit: 2 Person assistance; Moderate assistance  Sit to Supine: Unable to assess  Comment: HOB elevated  Transfers  Stand Pivot Transfers: Maximum assistance;2 Person assistance  Sit to stand: 2 Person assistance; Moderate assistance  Stand to sit: Moderate assistance;2 Person assistance  Transfer Comments: used r/w                                   Positioning  Bed Postion Comment: NWB LLE in knee immobilizer; BLE elevated (IN RECLINER)                             Plan   Plan  Times per week: 3-5  Specific instructions for Next Treatment: transfers with PT; standing ADLs  Current Treatment Recommendations: Strengthening,Wheelchair Mobility Training,Positioning,Pain Management,ROM,Safety Education & Training,Balance Training,Patient/Caregiver Education & Training,Self-Care / ADL,Cognitive/Perceptual Training,Functional Mobility Training,Equipment Evaluation, Education, & procurement,Home Management Training,Endurance Training    Goals (On-Going)   Short term goals  Time Frame for Short term goals: 1 week  Short term goal 1: Complete transfers with CGA and DME. Short term goal 2: Perform static, standing ADL with unilateral support and CGA for 5 min. Short term goal 3: Complete LBD with min A and AE/DME. Short term goal 4: Complete toileting skills with CGA and DME. Short term goal 5: Pt/family will verbalize/demo: AE/DME options; LLE NWB precautions; knee immobilizer use; recommended therapeutic activities; homemaking/IADL strategies; energy conservation techniques; and fall prevention strategies. Long term goals  Long term goal 1: Progress as tolerated.        Tx Time  Minutes  [address], OTR/L  Electronically signed by Elise Stone OTR/L on 1/26/2022 at 11:52 AM.

## 2022-01-26 NOTE — CARE COORDINATION
Pt has been accepted at [facility] pending insurance pre-cert. Pre-cert will need to be approved before Pt is able to DC to the facility. Once cleared, then Pt is able to DC and Pt chair time/location for dialysis has been changed. [employer] will be taking Pt to dialysis while at the facility.    [facility]   P  [phone] F  Electronically signed by Francis James on 1/26/2022 at 3:55 PM

## 2022-01-27 VITALS
BODY MASS INDEX: 23.14 KG/M2 | HEIGHT: 66 IN | RESPIRATION RATE: 18 BRPM | WEIGHT: 144 LBS | SYSTOLIC BLOOD PRESSURE: 145 MMHG | DIASTOLIC BLOOD PRESSURE: 70 MMHG | OXYGEN SATURATION: 92 % | TEMPERATURE: 97.3 F | HEART RATE: 65 BPM

## 2022-01-27 PROBLEM — Z86.19 HISTORY OF INFECTION WITH VANCOMYCIN RESISTANT ENTEROCOCCUS (VRE): Status: ACTIVE | Noted: 2022-01-27

## 2022-01-27 PROBLEM — B95.2 ENTEROCOCCUS UTI: Status: ACTIVE | Noted: 2022-01-26

## 2022-01-27 LAB
ANION GAP SERPL CALCULATED.3IONS-SCNC: 18 MMOL/L (ref 7–19)
BASOPHILS ABSOLUTE: 0.1 K/UL (ref 0–0.2)
BASOPHILS RELATIVE PERCENT: 1.6 % (ref 0–1)
BUN BLDV-MCNC: 46 MG/DL (ref 6–20)
CALCIUM SERPL-MCNC: 8.9 MG/DL (ref 8.6–10)
CHLORIDE BLD-SCNC: 94 MMOL/L (ref 98–111)
CO2: 24 MMOL/L (ref 22–29)
CREAT SERPL-MCNC: 5.8 MG/DL (ref 0.5–0.9)
CULTURE, BLOOD 2: NORMAL
EOSINOPHILS ABSOLUTE: 0.2 K/UL (ref 0–0.6)
EOSINOPHILS RELATIVE PERCENT: 4.8 % (ref 0–5)
GFR AFRICAN AMERICAN: 9
GFR NON-AFRICAN AMERICAN: 8
GLUCOSE BLD-MCNC: 113 MG/DL (ref 74–109)
GLUCOSE BLD-MCNC: 162 MG/DL (ref 70–99)
GLUCOSE BLD-MCNC: 69 MG/DL (ref 70–99)
HCT VFR BLD CALC: 26.6 % (ref 37–47)
HEMOGLOBIN: 8.1 G/DL (ref 12–16)
IMMATURE GRANULOCYTES #: 0 K/UL
LYMPHOCYTES ABSOLUTE: 0.9 K/UL (ref 1.1–4.5)
LYMPHOCYTES RELATIVE PERCENT: 21.1 % (ref 20–40)
MCH RBC QN AUTO: 30.3 PG (ref 27–31)
MCHC RBC AUTO-ENTMCNC: 30.5 G/DL (ref 33–37)
MCV RBC AUTO: 99.6 FL (ref 81–99)
MONOCYTES ABSOLUTE: 0.6 K/UL (ref 0–0.9)
MONOCYTES RELATIVE PERCENT: 12.8 % (ref 0–10)
NEUTROPHILS ABSOLUTE: 2.6 K/UL (ref 1.5–7.5)
NEUTROPHILS RELATIVE PERCENT: 58.8 % (ref 50–65)
PDW BLD-RTO: 18 % (ref 11.5–14.5)
PERFORMED ON: ABNORMAL
PERFORMED ON: ABNORMAL
PLATELET # BLD: 102 K/UL (ref 130–400)
PMV BLD AUTO: 10.4 FL (ref 9.4–12.3)
POTASSIUM REFLEX MAGNESIUM: 5.2 MMOL/L (ref 3.5–5)
RBC # BLD: 2.67 M/UL (ref 4.2–5.4)
SARS-COV-2, NAAT: NOT DETECTED
SODIUM BLD-SCNC: 136 MMOL/L (ref 136–145)
WBC # BLD: 4.4 K/UL (ref 4.8–10.8)

## 2022-01-27 PROCEDURE — 8010000000 HC HEMODIALYSIS ACUTE INPT

## 2022-01-27 PROCEDURE — 82947 ASSAY GLUCOSE BLOOD QUANT: CPT

## 2022-01-27 PROCEDURE — 6360000002 HC RX W HCPCS: Performed by: NURSE PRACTITIONER

## 2022-01-27 PROCEDURE — 80048 BASIC METABOLIC PNL TOTAL CA: CPT

## 2022-01-27 PROCEDURE — 36415 COLL VENOUS BLD VENIPUNCTURE: CPT

## 2022-01-27 PROCEDURE — 85025 COMPLETE CBC W/AUTO DIFF WBC: CPT

## 2022-01-27 PROCEDURE — 6370000000 HC RX 637 (ALT 250 FOR IP): Performed by: NURSE PRACTITIONER

## 2022-01-27 PROCEDURE — 87635 SARS-COV-2 COVID-19 AMP PRB: CPT

## 2022-01-27 RX ORDER — DULOXETIN HYDROCHLORIDE 60 MG/1
60 CAPSULE, DELAYED RELEASE ORAL DAILY
Qty: 30 CAPSULE | Refills: 0 | Status: SHIPPED | OUTPATIENT
Start: 2022-02-03

## 2022-01-27 RX ORDER — HYDROCODONE BITARTRATE AND ACETAMINOPHEN 5; 325 MG/1; MG/1
1 TABLET ORAL EVERY 6 HOURS PRN
Qty: 12 TABLET | Refills: 0 | Status: SHIPPED | OUTPATIENT
Start: 2022-01-27 | End: 2022-01-30

## 2022-01-27 RX ORDER — LINEZOLID 600 MG/1
600 TABLET, FILM COATED ORAL EVERY 12 HOURS SCHEDULED
Qty: 12 TABLET | Refills: 0 | Status: SHIPPED | OUTPATIENT
Start: 2022-01-27 | End: 2022-02-02

## 2022-01-27 RX ORDER — CYCLOBENZAPRINE HCL 10 MG
10 TABLET ORAL 3 TIMES DAILY PRN
Qty: 30 TABLET | Refills: 0 | Status: SHIPPED | OUTPATIENT
Start: 2022-01-27 | End: 2022-02-06

## 2022-01-27 RX ADMIN — HYDROCODONE BITARTRATE AND ACETAMINOPHEN 1 TABLET: 5; 325 TABLET ORAL at 03:08

## 2022-01-27 RX ADMIN — CYCLOBENZAPRINE 10 MG: 10 TABLET, FILM COATED ORAL at 12:13

## 2022-01-27 RX ADMIN — HEPARIN SODIUM 5000 UNITS: 5000 INJECTION INTRAVENOUS; SUBCUTANEOUS at 02:20

## 2022-01-27 RX ADMIN — HYDRALAZINE HYDROCHLORIDE 100 MG: 25 TABLET, FILM COATED ORAL at 03:08

## 2022-01-27 RX ADMIN — HYDROCODONE BITARTRATE AND ACETAMINOPHEN 1 TABLET: 5; 325 TABLET ORAL at 15:40

## 2022-01-27 RX ADMIN — LACTULOSE 15.33 G: 20 SOLUTION ORAL at 15:37

## 2022-01-27 RX ADMIN — LEVOTHYROXINE SODIUM 150 MCG: 0.07 TABLET ORAL at 06:36

## 2022-01-27 RX ADMIN — SEVELAMER CARBONATE 800 MG: 800 TABLET, FILM COATED ORAL at 12:13

## 2022-01-27 RX ADMIN — LINEZOLID 600 MG: 600 TABLET, FILM COATED ORAL at 12:13

## 2022-01-27 RX ADMIN — METOPROLOL SUCCINATE 150 MG: 50 TABLET, EXTENDED RELEASE ORAL at 06:36

## 2022-01-27 ASSESSMENT — PAIN SCALES - GENERAL
PAINLEVEL_OUTOF10: 8
PAINLEVEL_OUTOF10: 9

## 2022-01-27 NOTE — DISCHARGE SUMMARY
[id number] [facility]    Discharge Summary      Ar Meade  :  1970  MRN:  [id number]    Admit date:  2022  Discharge date:    2022    Discharging Physician:  Dr. Mathew Delgado     Advance Directive: Full Code    Consults: nephrology and orthopedic surgery    Primary Care Physician:  ROMINA Peng    Discharge Diagnoses:  Principal Problem:    Closed fracture of left distal femur [facility])  Active Problems:    UTI (urinary tract infection)    Anemia in chronic kidney disease (CKD)    Noncompliance of patient with renal dialysis ([facility]    Uncontrolled type 2 diabetes mellitus with complication, with long-term current use of insulin ([facility]    ESRD (end stage renal disease) on dialysis ([facility]    Hyperglycemia due to type 2 diabetes mellitus ([facility]    Uncontrolled hypertension    Medically noncompliant    Closed fracture of distal end of left femur, initial encounter ([facility]  Resolved Problems:    * No resolved hospital problems. *      Portions of this note have been copied forward, however, changed to reflect the most current clinical status of this patient. Hospital Course: The patient is a 49 y. o. female with PMH of ESRD on HD, DM, HTN, restless leg syndrome, and arthritis who presented to [address] [place] [address] ED complaining of left leg pain after a fall at home. Ms. Evette Anne reported she was getting ready for dialysis on day of admission when she fell. Reported \"a pop\" in her left knee when she fell. Denied head injury or LOC. Work-up in ED revealed acute nondisplaced fracture through the distal femoral diaphysis of the left femur. Chest x-ray indicated cardiomegaly and interstitial coarsening, concerning for fluid overload, right infrahilar opacity, may reflect atelectasis, aspiration, infection. BG greater than 500, SBP over 200. BP and Glucose stabilized in ED.  Patient was admitted to hospital medicine for closed fracture of the left distal femur with orthopedic surgery consultation, hypoglycemia, and hypertensive urgency. Home BP medications resumed, BP now within acceptable limits. Resumed home Lantus and prandial insulin, glucose now stabilized. Patient was evaluated by orthopedic surgery whom recommended nonsurgical management at this time with knee immobilizer in place and nonweightbearing on the LLE for 12 weeks. Urinalysis showed large leukocytes. Urine culture grew Enterococcus faecium. Patient was started on Zyvox and Merrem. Patient is being discharged on Zyvox, Flexeril, and hold Cymbalta while taking Zyvox. Follow-up with PCP and orthopedic surgery as recommended. Patient is currently in stable condition to be discharged to [facility]. Significant Diagnostic Studies:     XR PELVIS (1-2 VIEWS)  Result Date: 1/22/2022    Impression: No acute osseous pathology. Signed by Dr Toney Stevens      XR FEMUR LEFT (MIN 2 VIEWS)  Result Date: 1/22/2022    Impression: Acute nondisplaced fracture through the distal femoral diaphysis. Signed by Dr Toney Stevens      XR KNEE LEFT (1-2 VIEWS)  Result Date: 1/22/2022    Impression: Acute nondisplaced fracture through the distal femoral diaphysis. Signed by Dr Toney Stevens      XR FOOT LEFT (2 VIEWS)  Result Date: 1/22/2022    Impression: No acute osseous pathology. Signed by Dr Toney Stevens      XR CHEST PORTABLE  Result Date: 1/22/2022    Impression: Cardiomegaly and interstitial coarsening, concerning for fluid overload. Right infrahilar opacity, may reflect atelectasis, aspiration or infection.  Signed by Dr Toney Stevens      Pertinent Labs:   CBC:   Recent Labs     01/25/22  0422 01/26/22  0252 01/27/22  0321   WBC 5.5 5.2 4.4*   HGB 7.6* 8.5* 8.1*   PLT 86* 81* 102*     BMP:    Recent Labs     01/25/22  0422 01/26/22  0252 01/27/22  0321    139 136   K 4.8 5.0 5.2*   CL 93* 97* 94*   CO2 22 23 24   BUN 58* 32* 46*   CREATININE 5.9* 4.5* 5.8*   GLUCOSE 64* 104 113*       Physical Exam:   Vital Signs: BP (!) 145/70   Pulse 65   Temp 97.3 °F (36.3 °C) (Temporal)   Resp 18   Ht 5' 6\" (1.676 m)   Wt 144 lb (65.3 kg)   SpO2 92%   BMI 23.24 kg/m²   General appearance:. Alert and Cooperative   HEENT: Normocephalic. Chest: Lung sounds clear bilaterally without wheezes or rhonchi. Cardiac: RRR, S1, S2 normal. No murmurs, gallops, or rubs auscultated. Abdomen: soft, non-tender; non-distended normal bowel sounds no masses, no organomegaly. Extremities: No clubbing or cyanosis. No peripheral edema. Peripheral pulses palpable. Knee immobilizer in place to LLE     Neurologic: Grossly intact. Discharge Medications:          Medication List      START taking these medications    cyclobenzaprine 10 MG tablet  Commonly known as: FLEXERIL  Take 1 tablet by mouth 3 times daily as needed for Muscle spasms     linezolid 600 MG tablet  Commonly known as: ZYVOX  Take 1 tablet by mouth every 12 hours for 6 days        CHANGE how you take these medications    DULoxetine 60 MG extended release capsule  Commonly known as: CYMBALTA  Take 1 capsule by mouth daily Hold Cymbalta while taking Zyvox Antibiotic  Start taking on: February 3, 2022  What changed:   · additional instructions  · These instructions start on February 3, 2022. If you are unsure what to do until then, ask your doctor or other care provider.      metoprolol succinate 100 MG extended release tablet  Commonly known as: TOPROL XL  Take 1 tablet by mouth every morning (before breakfast)  What changed: how much to take        CONTINUE taking these medications    albuterol sulfate  (90 Base) MCG/ACT inhaler     Basaglar KwikPen 100 UNIT/ML injection pen  Generic drug: insulin glargine  Inject 5 Units into the skin nightly Indications: Diabetes     cloNIDine 0.1 MG/24HR Ptwk  Commonly known as: CATAPRES  Place 1 patch onto the skin once a week     hydrALAZINE 100 MG tablet  Commonly known as: APRESOLINE  Take 1 tablet by mouth every 8 hours     HYDROcodone-acetaminophen 5-325 MG per tablet  Commonly known as: Norco  Take 1 tablet by mouth every 6 hours as needed for Pain.     isosorbide mononitrate 120 MG extended release tablet  Commonly known as: IMDUR  Take 1 tablet by mouth daily     lactulose 10 GM/15ML solution  Commonly known as: CHRONULAC     levothyroxine 150 MCG tablet  Commonly known as: SYNTHROID     lisinopril 20 MG tablet  Commonly known as: PRINIVIL;ZESTRIL  Take 1 tablet by mouth 2 times daily     nicotine 14 MG/24HR  Commonly known as: NICODERM CQ  Place 1 patch onto the skin daily     NIFEdipine 30 MG extended release tablet  Commonly known as: PROCARDIA XL  Take 3 tablets by mouth daily     NovoLOG FlexPen 100 UNIT/ML injection pen  Generic drug: insulin aspart  Inject 4 Units into the skin 3 times daily (before meals) Indications: Diabetes     rOPINIRole 2 MG tablet  Commonly known as: REQUIP     Senna-Lax 8.6 MG tablet  Generic drug: senna     sevelamer 800 MG tablet  Commonly known as: RENVELA     simvastatin 40 MG tablet  Commonly known as: ZOCOR     Vitamin D2 10 MCG (400 UNIT) Tabs           Where to Get Your Medications      These medications were sent to [address] [place] [address]    Phone: [phone]   · cyclobenzaprine 10 MG tablet  · DULoxetine 60 MG extended release capsule  · linezolid 600 MG tablet            Discharge Instructions: Follow up with ROMINA Noguera in 7 days. Follow-up with orthopedic surgery as recommended. Take medications as directed. Resume activity as tolerated. Diet: ADULT DIET; Easy to Chew; 4 carb choices (60 gm/meal); Low Fat/Low Chol/High Fiber/LUIS; Low Sodium (2 gm); Low Potassium (Less than 3000 mg/day); Low Phosphorus (Less than 1000 mg); 60 to 80 gm     Disposition: Patient is medically stable and will be discharged to SNF.     Time spent on discharge 38 minutes spent in assessing patient, reviewing medications, discussion with nursing, confirming safe discharge plan and preparation of discharge summary. Signed:  Electronically signed by ROMINA Crockett CNP on 1/27/22 at 2:40 PM CST         EMR Dragon/Transcription disclaimer:   Much of this encounter note is an electronic transcription/translation of spoken language to printed text.  The electronic translation of spoken language may permit erroneous, or at times, nonsensical words or phrases to be inadvertently transcribed; although attempts have made to review the note for such errors, some may still exist.

## 2022-01-27 NOTE — PROGRESS NOTES
SubCUTAneous Once per day on Mon Wed Fri Shameka Polanco MD   10,000 Units at 01/26/22 1237    cyclobenzaprine (FLEXERIL) tablet 10 mg  10 mg Oral TID PRN Merline Comp, APRN - CNP   10 mg at 01/26/22 1232    hydrALAZINE (APRESOLINE) injection 10 mg  10 mg IntraVENous Q6H PRN Merline ROMINA Snider - CNP   10 mg at 01/25/22 1520    ipratropium (ATROVENT) 0.02 % nebulizer solution 0.5 mg  0.5 mg Nebulization 4x Daily PRN ROMINA Valdez CNP        cloNIDine (CATAPRES) 0.1 MG/24HR 1 patch  1 patch TransDERmal Weekly ROMINA Valdez CNP        [Held by provider] DULoxetine (CYMBALTA) extended release capsule 60 mg  60 mg Oral Daily ROMINA Valdez CNP   60 mg at 01/26/22 1236    vitamin D3 (CHOLECALCIFEROL) tablet 400 Units  400 Units Oral Q14 Days ROMINA Valdez CNP   400 Units at 01/26/22 1232    hydrALAZINE (APRESOLINE) tablet 100 mg  100 mg Oral 3 times per day ROMINA Valdez CNP   100 mg at 01/27/22 0308    HYDROcodone-acetaminophen (NORCO) 5-325 MG per tablet 1 tablet  1 tablet Oral Q4H PRN ROMINA Valdez CNP   1 tablet at 01/27/22 0308    isosorbide mononitrate (IMDUR) extended release tablet 120 mg  120 mg Oral Daily ROMINA Valdez CNP   120 mg at 01/26/22 1235    lactulose (CHRONULAC) 10 GM/15ML solution 15.3333 g  15.3333 g Oral TID ROMINA Valdez CNP   15.3333 g at 01/26/22 2115    levothyroxine (SYNTHROID) tablet 150 mcg  150 mcg Oral Daily ROMINA Valdez CNP   150 mcg at 01/27/22 0636    lisinopril (PRINIVIL;ZESTRIL) tablet 20 mg  20 mg Oral BID ROMINA Valdez CNP   20 mg at 01/26/22 2115    metoprolol succinate (TOPROL XL) extended release tablet 150 mg  150 mg Oral QAM ROMINA Munoz CNP   150 mg at 01/27/22 0636    nicotine (NICODERM CQ) 14 MG/24HR 1 patch  1 patch TransDERmal Daily ROMINA Valdez CNP   1 patch at 01/26/22 1236    rOPINIRole (REQUIP) tablet 4 mg  4 mg Oral Nightly Raymon Killbuck, APRN - CNP   4 mg at 01/26/22 2115    senna (SENOKOT) tablet 8.6 mg  1 tablet Oral Daily Raymon Killbuck, APRN - CNP   8.6 mg at 01/26/22 1234    sevelamer (RENVELA) tablet 800 mg  800 mg Oral TID WC [place] Killbuck, APRN - CNP   800 mg at 01/26/22 1741    atorvastatin (LIPITOR) tablet 40 mg  40 mg Oral Daily Raymon Killbuck, APRN - CNP   40 mg at 01/26/22 1236    glucose (GLUTOSE) 40 % oral gel 15 g  15 g Oral PRN Raymon Killbuck, APRN - CNP        glucagon (rDNA) injection 1 mg  1 mg IntraMUSCular PRN [place] Killbuck, APRN - CNP        dextrose 5 % solution  100 mL/hr IntraVENous PRN [place] Killbuck, APRN - CNP        sodium chloride flush 0.9 % injection 5-40 mL  5-40 mL IntraVENous 2 times per day Raymon Killbuck, APRN - CNP   10 mL at 01/25/22 2036    sodium chloride flush 0.9 % injection 5-40 mL  5-40 mL IntraVENous PRN Raymon Killbuck, APRN - CNP        0.9 % sodium chloride infusion  25 mL IntraVENous PRN Raymon Killbuck, APRN - CNP        ondansetron (ZOFRAN-ODT) disintegrating tablet 4 mg  4 mg Oral Q8H PRN [place] Killbuck, APRN - CNP        Or    ondansetron (ZOFRAN) injection 4 mg  4 mg IntraVENous Q6H PRN [place] Killbuck, APRN - CNP   4 mg at 01/25/22 2059    polyethylene glycol (GLYCOLAX) packet 17 g  17 g Oral Daily PRN [place] Killbuck, APRN - CNP        acetaminophen (TYLENOL) tablet 650 mg  650 mg Oral Q6H PRN Raymon Killbuck, APRN - CNP        Or    acetaminophen (TYLENOL) suppository 650 mg  650 mg Rectal Q6H PRN Raymon Killbuck, APRN - CNP        aluminum & magnesium hydroxide-simethicone (MAALOX) 200-200-20 MG/5ML suspension 30 mL  30 mL Oral Q6H PRN [place] Killbuck, APRN - CNP        heparin (porcine) injection 5,000 Units  5,000 Units SubCUTAneous 3 times per day Raymon Killbuck, APRN - CNP   5,000 Units at 01/27/22 0220    guaiFENesin (MUCINEX) extended release tablet 600 mg  600 mg Oral BID Cyndra Hoangter, APRN - CNP   600 mg at 01/26/22 2115    insulin glargine (LANTUS) injection vial 11 Units  0.15 Units/kg SubCUTAneous Nightly Cyndra Holter, APRN - CNP   11 Units at 01/26/22 2303    insulin lispro (HUMALOG) injection vial 4 Units  0.05 Units/kg SubCUTAneous TID  Cyndra Holter, APRN - CNP   4 Units at 01/26/22 1727    insulin lispro (HUMALOG) injection vial 0-18 Units  0-18 Units SubCUTAneous TID  Cyndra Holter, APRN - CNP   3 Units at 01/26/22 1728    insulin lispro (HUMALOG) injection vial 0-9 Units  0-9 Units SubCUTAneous Nightly Cyndra Holter, APRN - CNP   3 Units at 01/23/22 2115    albuterol (PROVENTIL) nebulizer solution 2.5 mg  2.5 mg Nebulization Q4H PRN Cyndra Holter, APRN - CNP        NIFEdipine (PROCARDIA XL) extended release tablet 90 mg  90 mg Oral Daily Cyndra Hoangter, APRN - CNP   90 mg at 01/26/22 1234       Past Medical History:  Past Medical History:   Diagnosis Date    Arthritis     Chronic kidney disease, stage 5, kidney failure ([address]     Closed fracture of right shoulder girdle, with routine healing, subsequent encounter     DM (diabetes mellitus) type II controlled with renal manifestation ([facility] 06/1993    Gastroparesis     GERD (gastroesophageal reflux disease)     Hemodialysis patient ([facility]     dialysis on tues, thur, and sat at [facility]    Hypertension     Hypothyroid     Nasal congestion     recent    Neuropathy     Noncompliance with medications     Palliative care patient 10/08/2019    Restless leg syndrome        Past Surgical History:  Past Surgical History:   Procedure Laterality Date    BREAST SURGERY Left 2008    infected milk gland removed    BREAST SURGERY Right 5/25/2021    WEDGE EXCISION RIGHT BREAST DUCT WITH LACRIMAL DUCT PROBE, PEC BLOCK performed by Aurelio Quinn MD at [address] [place] [address], LAPAROSCOPIC      2008    COLONOSCOPY Left 9/17/2020    Dr Pau Ellis hepatic flexure-Severe tortuosity with severe spasming, 1 yr recall    DIALYSIS FISTULA CREATION Left 1/25/2019    REVISION LEFT UPPER EXTREMITY AV ACCESS WITH LEFT BRACHIAL ARTERY TO LEFT AXILLARY VEIN WITH  INTERPOSITIONAL ARTEGRAFT   performed by Mario Mallory MD at [address]  2012    [address] Right 1/25/2019    INSERTION OF RIGHT INTERNAL JUGULAR HEMODIALYSIS CATHETER performed by Mario Mallory MD at [facility] [facility]  08/17/2018    1.  Moderately increased stainable iron identified by special stain in Kupffer cells and occasional hepatocytes. Negative for evidence of significant portal or lobular inflammation. Negative for evidence of significant fibrosis    [place] COLONOSCOPY W/BIOPSY SINGLE/MULTIPLE N/A 10/20/2017    Dr Jocelyne Arce prep-Tubular AP (-) dysplasia x 2--3 yr recall    [place] EGD TRANSORAL BIOPSY SINGLE/MULTIPLE N/A 12/27/2016    Dr Raffaele Garrison EGD TRANSORAL BIOPSY SINGLE/MULTIPLE N/A 10/20/2017    Dr Antoine Walker (-)   [address] [facility] VASCULAR SURGERY Left 2016    fistula by Dr Ankit Curiel at [address]  10/02/2017    [facility]. Left upper fistulograms/venograms, balloon angioplasty cephalic vein arch 28O99 conquest.    VASCULAR SURGERY  08/2018    FISTULOGRAM    VASCULAR SURGERY  10/29/2018    [facility]. Left upper fistulograms/venograms    VASCULAR SURGERY  12/19/2018    [facility]. Arch aortogram,left upper arteriograms.  VASCULAR SURGERY  03/06/2019    [facility]. Removal of tunneled dialyis catheter right internal jugular vein.  VASCULAR SURGERY  08/28/2019    [facility]. Left upper extremity fistulogram including venography to the superior vena cava. Balloon angioplasty left subclavian vein with 10mm x 40mm conquest balloon. Balloon angioplasty mid/proximal upper arm cephalic vein with 38NS x 40mm conquest balloon. Venograms after balloon angioplasty. Stent left mid/proximal upper arm cephalic vein stenosis fluency 10mm x 60mm self-expanding covered stent. Balloon     VASCULAR SURGERY  2019    cont angioplasty stent with 10mm x 40mm conquest balloon. Completion venograms left upper extremity. Family History  Family History   Problem Relation Age of Onset    Colon Cancer Mother          at 63    Colon Polyps Mother     Lung Cancer Father          at 79    Colon Polyps Father     Stomach Cancer Sister     Breast Cancer Sister 27    Depression Daughter 25        committed suicide    Liver Cancer Neg Hx     Liver Disease Neg Hx     Esophageal Cancer Neg Hx     Rectal Cancer Neg Hx        Social History  Social History     Socioeconomic History    Marital status: Single     Spouse name: Not on file    Number of children: 2    Years of education: Not on file    Highest education level: Not on file   Occupational History    Not on file   Tobacco Use    Smoking status: Current Every Day Smoker     Packs/day: 0.50     Years: 33.00     Pack years: 16.50     Types: Cigarettes    Smokeless tobacco: Never Used    Tobacco comment: NOW at 1/4 PD, started at age 24 and has averaged 2 PPD   Vaping Use    Vaping Use: Never used   Substance and Sexual Activity    Alcohol use: No    Drug use: Not Currently     Types: Marijuana [place])    Sexual activity: Not Currently     Partners: Male   Other Topics Concern    Not on file   Social History Narrative    Not on file     Social Determinants of Health     Financial Resource Strain:     Difficulty of Paying Living Expenses: Not on file   Food Insecurity:     Worried About Running Out of Food in the Last Year: Not on file    Kendy of Food in the Last Year: Not on file   Transportation Needs:     Lack of Transportation (Medical): Not on file    Lack of Transportation (Non-Medical):  Not on file   Physical Activity:     Days of Exercise per Week: Not on file    Minutes of Exercise per Session: Not on file   Stress:     Feeling of Stress : Not on file   Social Connections:     Frequency of Communication with Friends and Family: Not on file    Frequency of Social Gatherings with Friends and Family: Not on file    Attends Voodoo Services: Not on file    Active Member of Clubs or Organizations: Not on file    Attends Club or Organization Meetings: Not on file    Marital Status: Not on file   Intimate Partner Violence:     Fear of Current or Ex-Partner: Not on file    Emotionally Abused: Not on file    Physically Abused: Not on file    Sexually Abused: Not on file   Housing Stability:     Unable to Pay for Housing in the Last Year: Not on file    Number of [place] in the Last Year: Not on file    Unstable Housing in the Last Year: Not on file         Review of Systems:  Reviewed with the patient at the bedside, 6 points reviewed and negative except as noted above. Objective:  BP: 155/84   HR: 71  General: awake/alert   Chest:  clear to auscultation bilaterally without respiratory distress  CVS: regular rate and rhythm  Abdominal: soft, nontender, normal bowel sounds  Extremities: no cyanosis or edema  Skin: warm and dry without rash    Labs:  BMP:   Recent Labs     01/25/22 0422 01/26/22 0252 01/27/22  0321    139 136   K 4.8 5.0 5.2*   CL 93* 97* 94*   CO2 22 23 24   BUN 58* 32* 46*   CREATININE 5.9* 4.5* 5.8*   CALCIUM 8.7 8.7 8.9     CBC:   Recent Labs     01/25/22 0422 01/26/22 0252 01/27/22  0321   WBC 5.5 5.2 4.4*   HGB 7.6* 8.5* 8.1*   HCT 24.8* 27.8* 26.6*   MCV 97.6 98.6 99.6*   PLT 86* 81* 102*     LIVER PROFILE: No results for input(s): AST, ALT, LIPASE, BILIDIR, BILITOT, ALKPHOS in the last 72 hours. Invalid input(s): AMYLASE,  ALB  PT/INR: No results for input(s): PROTIME, INR in the last 72 hours. APTT: No results for input(s): APTT in the last 72 hours. BNP:  No results for input(s): BNP in the last 72 hours. Ionized Calcium:No results for input(s): IONCA in the last 72 hours.   Magnesium:No results for input(s): MG in the last 72 hours.  Phosphorus:No results for input(s): PHOS in the last 72 hours. HgbA1C: No results for input(s): LABA1C in the last 72 hours. Hepatic: No results for input(s): ALKPHOS, ALT, AST, PROT, BILITOT, BILIDIR, LABALBU in the last 72 hours. Lactic Acid: No results for input(s): LACTA in the last 72 hours. Troponin: No results for input(s): CKTOTAL, CKMB, TROPONINT in the last 72 hours. ABGs: No results for input(s): PH, PCO2, PO2, HCO3, O2SAT in the last 72 hours. CRP:  No results for input(s): CRP in the last 72 hours. Sed Rate:  No results for input(s): SEDRATE in the last 72 hours. Cultures:   No results for input(s): CULTURE in the last 72 hours. No results for input(s): BC, Frederich Murtaza in the last 72 hours. No results for input(s): CXSURG in the last 72 hours. Radiology reports as per the Radiologist  Radiology: XR PELVIS (1-2 VIEWS)    Result Date: 1/22/2022  Exam:   XR PELVIS (1-2 VIEWS)  Date:  1/22/2022 History:  Female, age  47 years; fall COMPARISON:  None. Findings : There is no acute displaced fracture. No dislocation. No suspicious lytic or blastic lesion. No radiopaque foreign body. Impression: No acute osseous pathology. Signed by Dr Jaimee Nicole    XR FEMUR LEFT (MIN 2 VIEWS)    Result Date: 1/22/2022  Exam:   XR FEMUR LEFT (MIN 2 VIEWS), XR KNEE LEFT (1-2 VIEWS)  Date:  1/22/2022 History:  Female, age  47 years; fall COMPARISON:  None. Findings : Demineralized skeleton limits bony details. Linear lucency in the distal femur extending through the posterior cortex, involving the distal diaphysis, is consistent with an acute nondisplaced fracture. There is no dislocation. Impression: Acute nondisplaced fracture through the distal femoral diaphysis. Signed by Dr Jaimee Nicole    XR KNEE LEFT (1-2 VIEWS)    Result Date: 1/22/2022  Exam:   XR FEMUR LEFT (MIN 2 VIEWS), XR KNEE LEFT (1-2 VIEWS)  Date:  1/22/2022 History:  Female, age  47 years; fall COMPARISON:  None.  Findings : Demineralized skeleton limits bony details. Linear lucency in the distal femur extending through the posterior cortex, involving the distal diaphysis, is consistent with an acute nondisplaced fracture. There is no dislocation. Impression: Acute nondisplaced fracture through the distal femoral diaphysis. Signed by Dr Jaimee Nicole    XR FOOT LEFT (2 VIEWS)    Result Date: 1/22/2022  Exam:   XR FOOT LEFT (2 VIEWS)  Date:  1/22/2022 History:  Female, age  47 years; fall COMPARISON:  None. Findings : There is no acute displaced fracture. No dislocation. No suspicious lytic or blastic lesion. No radiopaque foreign body. Vascular calcifications. Impression: No acute osseous pathology. Signed by Dr Jaimee Nicole    XR CHEST PORTABLE    Result Date: 1/22/2022  Exam:   XR CHEST PORTABLE  Date:  1/22/2022 History:  Female, age  47 years; preop COMPARISON:  Chest x-ray dated January 10, 2022 Findings : Mild cardiomegaly. Central pulmonary venous congestion and coarsened interstitium. Right infrahilar opacity. The bones show no acute pathology. Vascular stent projecting over the left axilla and thoracic inlet. Impression: Cardiomegaly and interstitial coarsening, concerning for fluid overload. Right infrahilar opacity, may reflect atelectasis, aspiration or infection. Signed by Dr Shree Perdomo   -ESRD  -DM2 with nephropathy on long-term insulin, uncontrolled  -HTN  -Hyponatremia  -Hypokalemia  -Secondary Hyperparathyroidism  -Anemia CKD  -Seizure disorder  -Hypothyroidism   -S/p fall and Acute nondisplaced fracture through the distal femoral diaphysis          Plan:  Dialysis as above. Continue Retacrit for anemia management.      Irene Beebe MD, MD  01/27/22  10:45 AM

## 2022-01-27 NOTE — PROGRESS NOTES
Occupational Therapy               RE: Gio Duarte          MRN: [id number]      OT attempted therapy tx earlier this AM with PT. Patient was out to HD. Will follow-up at a later time.      Blessing Mcmillan OTR/L  Electronically signed by Lluvia Adams OTR/L on 1/27/2022 at 3:09 PM.

## 2022-01-27 NOTE — CARE COORDINATION
Documentation requested attached. HD Chair Place change. Patient Information    Patient Name   Preston Velasco ([id number]) Legal Sex   Female    1970   Room Bed   0518 [id number]       Patient Demographics    Address   [address]   [address] Phone   [phone] (Home) *Preferred*   [phone] [facility]     Social Security Number    N        Basic Information    Date Of Birth   1970 Gender Identity   Female Race   White (non-) Ethnic Group   Non- / Non  Preferred Language   English Preferred Written Language   English     Admission Information      Current Information    Attending Provider Admitting Provider Admission Type Admission Status   MD Vanessa Solano MD Emergency Confirmed Admission          Admission Date/Time Discharge Date Hospital Service Auth/Cert Status     05:58 AM  Med/Surg [facility] Room/Bed    24 [facility] 5 SURG SERVICES [id number]/[id number]                Admission    Complaint        PCP and Center    Primary Care Provider [facility]             PRIMARY INSURANCE   Payor: Johneda Endicott Plan: Eleda Endicott    Group Number:   Insurance Type: [id number] Name: Rubin Mg : 1970   Subscriber ID: [id number]       Pat. Rel. to Subscriber: Self       SECONDARY INSURANCE   Payor: [id number]: [facility]*   Group Number: [id number] Insurance Type: INDEMNITY   Subscriber Name: Rubin Mg : 1970   Subscriber ID: [id number]       Pat.  Rel. to Subscriber: SELF              Documents on File     Status Date Received Description   Documents for the Patient   Insurance Card Received () 16    [facility] [employer] Not Received     Financial Assistance Program Applications Not Received     Drivers License 16    Insurance Card  () 16    Cardiac Rehab Phase 3 Payment Not Received     Recurring Hospital Consent/HIPAA Scanned Not Received     ACP-Advance Directive Received 17    (OLD) [facility]) Physician Consent and NPP Signed () 16    Physician Office Consent for Treatment Not Received     Financial Responsibility Form Signed 16    MyChart Adult Proxy Access Not Received     MyChart Child Proxy Access Not Received     HIPAA Notice of Privacy      ACO Consent for the Release of  Confidential Alcohol or Drug Treatment Information Not Received     ACO Declining to [email or web address] [facility] Not Received     Outside Record   Yolanda 702 Referral- medical records to Dr. Keith Gentile   Outside Record   10/19/16 REF R Radha 112, APRN   Form   10.31. GI REFERRAL COMPLETION FORM   Form   16 GI PASSPORT REF FORM   Photo ID Received () 17 2.20.   Insurance Card Received () 17 PASSPORT   Insurance Card Received () 17 MEDICARE   Insurance Card Received () 16 [address]   Outside Record   16 Office Note from Aide Arana, APRN   Form   [phone] GI FORM FOR EGD   Advance Directive Assessment  16    Insurance Card  () 16    Form   2. 9.2017 GI FORM FOR COLON    Form   2. 9.2017 GI FORM FOR COLON    Advance Directive Assessment Received 17    Advance Directive Assessment  17    HIM LANDON Authorization Received 18 [facility]    HIM LANDON Authorization  17    (OLD) [facility] ([facility]) Physician Consent and NPP Signed () 17 New   Text Reminder Consent Received () 17    [address] Physician Communication Release NPP Signed () 17    Advance Directive Assessment Received 17    [facility]) Physician Consent and NPP Signed () 17    Outside Record   17 REF INFO FROM Carlos Abdalla, NP   Form   CLN/EGD [facility]   Other Results 2017 galo LI   Advance Directive Assessment Received 10/02/17    Advance Directive Assessment Received 10/20/17    Advance Directive Assessment Received 10/23/17    Advance Directive Assessment Received 10/24/17    Photo ID Received () 17 same as above   [employer] Giovanny Received () 17 same as above; medicare, passport   Advance Directive Assessment Received 17    Consultation Reports/Note Received 17 Consult - Angles   HIM LANDON Authorization  17    Miscellaneous Received 17 REGLAN CONSENT   Advance Directive Assessment Received 17    HIM LANDON Authorization  17    HIM LANDON Authorization  17    Advance Directive Assessment Received 17    Financial Responsibility Form Signed 18 [facility]    Advance Directive Assessment Received 18    HIM LANDON Authorization  18    Form Received 18 Completed referral form to [address] [place] [address] Rosemary James   Correspondence Received 18 Referral comformation from [facility] /   Other Results Received 18 galo LOVINGS   Photo ID Received () 18 Exp.  2019   Photo ID Received () 08/15/18    Form Received 18 [employer].  Esperanza. [email or web address]/Jose APRN/LAWRENCE polk   HIM LANDON Authorization  18 dos  2018   Lab Result Scan Received 10/23/18 Dialysis Labs 10/23/18   Correspondence Received 10/23/18 Dialysis Communication 10/23/18   Other Results Received 10/29/18 10/24/2018 GALO Orosco Physician Consent and NPP Signed () 18    [address] Physician Communication Release NPP Signed () 18 HIPAA   Insurance Card Received () 18 Medicare & Passport   Text Reminder Consent Received () 18    Form Received 18 [facility] LAB REQUEST FORM   Lab Result Scan Received 12/10/18 [address] LAB RESULTS   Insurance Card Not Received () 18 did not bring new  card Photo ID Received () 19 exp. 19 - pt will go get new licenses   Insurance Card Received () 19 Medicare NEW & Passport   Advance Directive Assessment Received 19    Other Results Received 19 amberly LI   Advance Directive Assessment Received 19    Outside Record Received 19 Office Notes- Dr Meche Bonds   Other Results Received 19 prescription for norco 5   Correspondence Received 19 Dialysis Communication/Labs 19   Progress Notes Received 19 Dr Stapleton;PRogress Notes 3-1-19   Advance Directive Assessment Received 19    HIM LANDON Authorization  04/15/19    Outside Record Received 10/01/19 2019 amberly LI   [facility] LANDON Authorization  10/17/19 10/8/2019-10/13/2019-DC Summary; H&P   [facility] LANDON Authorization  10/23/19 10/8/2019-10/13/2019-H&P; DC Summary   [facility] LANDON Authorization     Photo ID Received () 20 exp 2023   Insurance Card Received () 20    Photo ID Received () 20    Correspondence Received 20 Dialysis Filomena.    [facility] LANDON Authorization     Miscellaneous Received 20 [facility]   Insurance Card Received 20 [facility] Card Received 20 PASSPORT   Form Received 20 -GI SCHED FORM   ACP-Advance Directive Received 17    [address] Physician Communication Release NPP  20    [facility]) Physician Consent and NPP  () 20    Form Received 20 Flu Clinic Intake Form   Advance Directive Assessment Received 20    HIM LANDON Authorization  09/22/20 09/15/2020   Letter (Incoming) Received 20 APPT CONFIRMATION W/DR Chandrika Calle   HIM LANDON Authorization  11/17/20 10/21/20 [facility]   Text Reminder Consent Received () 21    [facility] ([facility]) Physician Consent and NPP Signed () 21    [address] Physician Communication Release NPP Signed 21    Photo ID Received 21 EXP 23   Insurance Card Received 21    Miscellaneous Received 21 Loye Servant   Advance Directive Assessment Received 21    HIM LANDON Authorization Received 21    [facility]) Physician Consent and NPP Signed 21    [address] Physician Communication Release NPP Signed 21    [address] Physician Communication Release NPP Signed 21    HIM LANDON Authorization  21-21   HIM LANDON Authorization  21   HIM LANDON Authorization  10/01/21 5/13/21 - 21   HIM LANDON Authorization  21   ACP-Advance Directive Received 12/15/21 Living Will   ACP-Advance Directive Received 21    HIM LANDON Authorization  21   Insurance Card Not Received 22 did not bring united healthcare card   Photo ID Received (Deleted) 16    CE Prospective Auth Received () 18 Authorization Document from [address] [place] [address] ([facility])   System-Retrieved CE Auth Form Received () 18 External Authorization Form from [address] [place] [address] ([facility])   HIM Release of Information Output  (Deleted) 04/15/19 Requested records   HIM Release of Information Output  (Deleted) 10/17/19 Requested records   HIM Release of Information Output  (Deleted) 10/23/19 Requested records   HIM Release of Information Output  20 Requested records   Insurance Card  (Deleted)  Medicare NEW  & Passport   HIM Release of Information Output  (Deleted) 20 Requested records   HIM Release of Information Output  (Deleted) 20 Requested records   HIM Release of Information Output  (Deleted) 20 Requested records   HIM Release of Information Output  (Deleted) 20 Requested records   [facility]) Physician Consent and NPP  (Deleted)     [address] Physician Communication Release NPP  (Deleted)     Photo ID Received (Deleted) 21    Insurance Card Received (Deleted) 21    HIM LANDON Authorization  (Deleted)     HIM Release of Information Output  (Deleted) 05/06/21 Requested records   HIM Release of Information Output  (Deleted) 09/03/21 Requested records   HIM Release of Information Output  10/01/21 Requested records   HIM Release of Information Output  (Deleted) 12/01/21 Requested records   HIM Release of Information Output  12/21/21 Requested records   HIM Release of Information Output  (Deleted) 12/27/21 Requested records   HIM Release of Information Output  01/14/22 Requested records   Documents for the [facility] Consent Treat/HIPAA Received 01/22/22    [facility] - Medicare Second Copy Given to Pt      Coverage COB Information Received 01/22/22    Medicare Important Message Received 01/22/22    Dialysis Received 01/24/22    EKG Scanned Received 01/24/22    Insurance Authorization Received 01/26/22 Insurance Authorization   EKG Received 01/24/22 EKG 12-LEAD on the ekg interface     Medical Problems  Comment          Hospital Problem List  Date Reviewed: 12/16/2021     ICD-10-CM Priority Class Noted POA   Closed fracture of left distal femur [facility]) S72.402A   1/22/2022 Yes   UTI (urinary tract infection) N39.0 High  1/26/2022 Yes   Anemia in chronic kidney disease (CKD) (Chronic) N18.9, D63.1   5/23/2017 Yes   Noncompliance of patient with renal dialysis ([facility] (Chronic) Z91.15   Unknown Yes   Uncontrolled type 2 diabetes mellitus with complication, with long-term current use of insulin (HCC) E11.8, E11.65, Z79.4   8/8/2018 Yes   ESRD (end stage renal disease) on dialysis ([facility] N18.6, Z99.2   Unknown Yes   Hyperglycemia due to type 2 diabetes mellitus ([facility] E11.65   1/26/2019 Yes   Uncontrolled hypertension I10   4/20/2019 Yes   Medically noncompliant (Chronic) Z91.19   4/20/2019 Yes   Closed fracture of distal end of left femur, initial encounter [facility]) S72.402A   1/22/2022 Yes     Non-Hospital Problem List  Date Reviewed: 12/16/2021     ICD-10-CM Priority Class Noted   Gastroesophageal reflux disease without esophagitis (Chronic) K21.9   Unknown   Acquired hypothyroidism (Chronic) E03.9   Unknown   Iron deficiency E61.1   5/23/2017   Family history of colon cancer Z80.0   10/20/2017   Unintentional weight loss R63.4   10/20/2017   Type 2 diabetes mellitus with chronic kidney disease on chronic dialysis, with long-term current use of insulin (HCC) (Chronic) E11.22, N18.6, Z99.2, Z79.4   12/9/2017   Acute encephalopathy G93.40   Unknown   Respiratory failure ([address] J96.90   3/5/2018   Acute respiratory failure with hypoxia and hypercapnia (HCC) J96.01, J96.02   3/7/2018   Dialysis AV fistula malfunction (HCC) T82.590A   6/27/2018   Gastroparesis K31.84   8/8/2018   Nausea and vomiting R11.2   8/8/2018   Chronic constipation K59.09   8/8/2018   Abnormal CT of liver R93.2   8/15/2018   Other ascites R18.8   8/15/2018   High hepatic iron concentration determined by biopsy of liver E83.19   12/5/2018   Steal syndrome as complication of dialysis access [facility]) T82.898A   12/19/2018   PVD (peripheral vascular disease) ([address] I73.9   Unknown   ESRD on hemodialysis ([address] N18.6, Z99.2   1/25/2019   Hyponatremia E87.1   1/26/2019   Normocytic anemia D64.9   1/26/2019   Volume overload E87.70   3/27/2019   Left homonymous hemianopsia H53.462   Unknown   Acute intractable headache R51.9   Unknown   Noncompliance with medications Z91.14   Unknown   Seizure ([address] R56.9   10/8/2019   Hyperglycemic hyperosmolar nonketotic coma ([address] E11.01   Unknown   Type 2 diabetes mellitus with hyperosmolar coma, with long-term current use of insulin ([address] E11.01, Z79.4   Unknown   Palliative care patient Z51.5   10/8/2019   Hypertensive emergency I16.1   6/24/2020   Chronic epigastric pain R10.13, G89.29   8/18/2020   Chronic nausea R11.0   8/18/2020   Chronic vomiting R11.10   8/18/2020   Abnormal CT of the abdomen R93.5   8/18/2020   Poor appetite R63.0   8/18/2020   Personal history of colonic polyps Z86.010   8/18/2020 Hyperosmolar syndrome E87.0   9/16/2020   DKA, type 2, not at goal [facility]) E11.10   10/21/2020   Hypertensive urgency I16.0   10/21/2020   Altered mental state R41.82   10/21/2020   Sacroiliitis ([facility] M46.1   1/13/2021   Lumbar radiculopathy M54.16   1/13/2021   Left leg pain M79.605   2/24/2021   Left leg swelling M79.89   2/24/2021   Unable to ambulate R26.2   2/24/2021   Discharge from [facility] N64.52   4/14/2021   Severe hyperglycemia due to diabetes mellitus ([facility] E11.65   5/15/2021   Acute hypoxemic respiratory failure ([facility] J96.01   5/29/2021   Generalized weakness R53.1   11/20/2021   Hyperosmolar hyperglycemic state (HHS) ([facility] E11.00, E11.65   12/14/2021   Hyperglycemia R73.9   1/1/2022   Hypoglycemia associated with diabetes [facility]) E11.649   1/10/2022

## 2022-01-27 NOTE — CARE COORDINATION
Received a call from Demetrio Paulino at [facility]. She states that patient has a chair time of 6:10 a.m. at the [facility]. Information request has been sent to [facility].     Electronically signed by Rolly Razo RN, BSN on 1/27/2022 at 10:15 AM

## 2022-01-27 NOTE — CARE COORDINATION
Pt has been accepted at [facility]. Pt has a HD chair at [place] at 6:10 a.m. T-TH-SAT. [employer] will be taking Pt to dialysis while at the facility. Pt will need an updated Negative Covid Swab prior to discharge.     [facility]   W  [phone] F  Electronically signed by Neda Freeman RN, BSN on 1/27/2022 at 11:41 AM

## 2022-01-27 NOTE — PROGRESS NOTES
Physical Therapy  Name: Wendy Ng  MRN:  [id number]  Date of service:  1/27/2022 01/27/22 1316   Subjective   Subjective Attempt: Pt out to HD in the a.m.          Electronically signed by Le Hastings PTA on 1/27/2022 at 1:16 PM

## 2022-01-27 NOTE — DISCHARGE INSTR - DIET
Good nutrition is important when healing from an illness, injury, or surgery. Follow any nutrition recommendations given to you during your hospital stay. If you were given an oral nutrition supplement while in the hospital, continue to take this supplement at home. You can take it with meals, in-between meals, and/or before bedtime. These supplements can be purchased at most local grocery stores, pharmacies, and [employer]-stores. If you have any questions about your diet or nutrition, call the hospital and ask for the dietitian. ADULT DIET; Easy to Chew; 4 carb choices (60 gm/meal); Low Fat/Low Chol/High Fiber/LUIS; Low Sodium (2 gm); Low Potassium (Less than 3000 mg/day);  Low Phosphorus (Less than 1000 mg); 60 to 80 gm

## 2022-01-27 NOTE — DISCHARGE INSTR - ACTIVITY
IMMOBILZER AND NWB LLE FOR UP TO 3 MTHS. WILL CHECK SERIAL XRAYS AND TREAT ACCORDINGLY.     2. ICE AND ELEVATE FOR COMFORT

## 2022-01-28 LAB
ORGANISM: ABNORMAL
URINE CULTURE, ROUTINE: ABNORMAL
URINE CULTURE, ROUTINE: ABNORMAL

## 2022-03-05 PROBLEM — R77.8 ELEVATED TROPONIN: Status: ACTIVE | Noted: 2022-01-01

## 2022-03-05 PROBLEM — E11.01 HHNC (HYPERGLYCEMIC HYPEROSMOLAR NONKETOTIC COMA) (HCC): Status: ACTIVE | Noted: 2021-01-01

## 2022-03-05 PROBLEM — G93.40 ACUTE ENCEPHALOPATHY: Status: ACTIVE | Noted: 2022-01-01

## 2022-03-05 NOTE — CONSULTS
"Adult Nutrition  Assessment/PES    Patient Name:  Ena Upton  YOB: 1970  MRN: [id number]  Admit Date:  3/5/2022    Assessment Date:  3/5/2022    Comments:  Consult received for nutrition assessment. Pt currently sedated on vent with propofol providing ~639 kcal/d.  If enteral nutrition desired, once OG able to be placed and placement confirmed, recommend initiation of Novasource Renal at 15 ml/hr. Do not advance Pt at goal rate. Administer 4 packets of ProSource TF daily per package instructions. Water flush 25 ml/hr or per total fluid/d recommendations by nephrology/MD.      Reason for Assessment              Reason for Assessment    Reason For Assessment physician consult     Diagnosis diabetes diagnosis/complications;renal disease;neurologic conditions;pulmonary disease     Identified At Risk by Screening Criteria no indicators present                Nutrition/Diet History              Nutrition/Diet History    Typical Intake (Food/Fluid/EN/PN) Pt currently sedated on ventilator. Currently NPO. Nursing with order to place OG tube and confirm placement.                Anthropometrics              Anthropometrics    Height -- 157.5 cm (62\")    Weight -- 75.5 kg (166 lb 7.2 oz)    Age for Calculations 52 --    VE (L/min) for Calculation 7.83 --    Tmax (Celcius) for Calculation 36.9 --    Height for Calculation 1.575 m (5' 2\") --    Weight for Calculation 75.5 kg (166 lb 7.2 oz) --       Ideal Body Weight (IBW)    Retired Ideal Body Weight (IBW) (kg) -- 50.43    Retired % Ideal Body Weight -- 149.71       Retired Body Mass Index (BMI)    Retired BMI (kg/m2) -- 30.51             Anthropometrics    Height 157.5 cm (62\")     Weight 80.7 kg (178 lb)            Ideal Body Weight (IBW)    Retired Ideal Body Weight (IBW) (kg) 50.43     Retired % Ideal Body Weight 160.1            Retired Body Mass Index (BMI)    Retired BMI (kg/m2) 32.62                Labs/Tests/Procedures/Meds              " "Labs/Procedures/Meds    Lab Results Reviewed reviewed, pertinent     Lab Results Comments BNP, Glucose >900, Sodium, anion gap, Creat, BUN, GFR, ALkPhos, Phosphorus, H/H            Medications    Pertinent Medications Reviewed reviewed, pertinent     Pertinent Medications Comments see MAR                Physical Findings              Physical Findings    Overall Physical Appearance sedated on mech vent. Jeyson Score 12.     Enteral Access Devices other (see comments)  awaiting placement     Additional Documentation Fluid Accumulation (Group)  edema noted                Estimated/Assessed Needs - Anthropometrics              Anthropometrics    Height -- 157.5 cm (62\")    Weight -- 75.5 kg (166 lb 7.2 oz)    Age for Calculations 52 --    VE (L/min) for Calculation 7.83 --    Tmax (Celcius) for Calculation 36.9 --    Height for Calculation 1.575 m (5' 2\") --    Weight for Calculation 75.5 kg (166 lb 7.2 oz) --       Estimated/Assessed Needs    Additional Documentation [place] Equation (Group);Estimated Calorie Needs (Group);Protein Requirements (Group);Fluid Requirements (Group) --       Estimated Calorie Needs    Estimated Calorie Requirement (kcal/day) 3905-8244 kcal/d --    Estimated Calorie Need Method [place] --       [place] Equation    RMR ([place] Equation) 1397.21 --       Modified [place] Equation    RMR (Modified [place] Equation) 1484.08 --       Protein Requirements    Weight Used For Protein Calculations 75.5 kg (166 lb 7.2 oz) --    Est Protein Requirement Amount (gms/kg) 1.2 gm protein --    Estimated Protein Requirements (gms/day) 90.6 --       Fluid Requirements    Fluid Requirements (mL/day) 1397.21  750-1500 ml water daily recommended with HD, will continue to monitor and adjust as needed --    Estimated Fluid Requirement Method RDA Method --    RDA Method (mL) 1397.21 --             Anthropometrics    Height 157.5 cm (62\")     Weight 80.7 kg (178 lb)                Nutrition " Prescription Ordered              Nutrition Prescription PO    Current PO Diet NPO            Propofol Considerations    Propofol (mL/hr) 24.2 mL/hr     Propofol (Kcal/day) 638.88 Kcal/day                Evaluation of Received Nutrient/Fluid Intake              Nutrient/Fluid Evaluation    Number of Days Evaluated Other (comment)  <24 hr intake, insufficient data            PO Evaluation    % PO Intake NPO                 Problem/Interventions:   Problem 1              Nutrition Diagnoses Problem 1    Problem 1 Needs Alternate Route     Etiology (related to) Medical Diagnosis     Endocrine DM2     Pulmonary/Critical Care Ventilator     Renal ESRD;Hemodialysis     Signs/Symptoms (evidenced by) NPO;No EN Route Available                  Intervention Goal              Intervention Goal    General Nutrition support treatment;Meet nutritional needs for age/condition;Disease management/therapy;Improved nutrition related lab(s);Reduce/improve symptoms     TF/PN Inititiate TF/PN;Deliver estimated need (%)  when clinically appropriate per MD     Deliver % of Goal      Deliver % of Estimated Need 75 %     Weight Maintain weight                Nutrition Intervention              Nutrition Intervention    RD/Tech Action Follow Tx progress;Care plan reviewd;Recommend/ordered     Recommended/Ordered EN                Nutrition Prescription              Nutrition Prescription EN    Enteral Prescription Enteral begin/change;Enteral to supply     Enteral Route OG     Product [facility]  Utilize cartons instead of bags d/t slow rate and minimal volume     Modulars Liquid Protein (15 gm/30 mL)  30 ml water before and after each administration     Liquid Protein (15 gm/30 mL) 30 mL/1 packet     Protein Liquid Frequency Other (comment)  4 packets daily     Water flush (mL)  25 mL     Water Flush Frequency Per hour     New EN Prescription Ordered? No, recommended            EN to Supply    Kcal/Day 820 Kcal/Day     Kcal/Kg 10.86  Kcal/Kg     Kcal/Kg Weight Method Actual weight     Kcal/day with Propofol  101 Kcal/day with Propofol     Protein (gm/day) 74 gm/day     Meet Estimated Kcal Need (%) 100 %     Meet Estimated Protein Need (%) 82 %     TF Free H2O (mL) 587.94 mL     Total Free H2O (mL/day) 1377.94 mL/day                Education/Evaluation              Education    Education No discharge needs identified at this time            Monitor/Evaluation    Monitor Per protocol                 Electronically signed by:  Keri Orozco RDN, JAMES  03/05/22 14:28 CST

## 2022-03-05 NOTE — CONSULTS
Nephrology ([facility]) Consult Note      Patient:  Ena Upton  YOB: 1970  Date of Service: 3/5/2022  MRN: [id number]   Acct: [id number]   Primary Care Physician: Yaron Wiggins APRN  Advance Directive:   There are no questions and answers to display.     Admit Date: 3/5/2022       Hospital Day: 0  Referring Provider: No Known Provider      Patient personally seen and examined.  Complete chart including Consults, Notes, Operative Reports, Labs, Cardiology, and Radiology studies reviewed as able.        Subjective:  Ena Upton is a 52 y.o. female for whom we were consulted for evaluation and treatment of end stage renal disease. Maintenance hemodialysis Tuesday Thursday Saturday at [facility]. Unknown last time she went to HD.  History of type 2 diabetes, hypertension, hypothyroidism.  Frequent hospitalizations due to noncompliance with dialysis and her other medical treatments including antihypertensives.  She was brought in by EMS found down at home.  Blood glucose was over 900 and the patient was on the ventilator and unable to participate in history physical examination.  Patient was seen on tolerating dialysis.  Events reviewed with nursing at the bedside as well as with Dr. Prado.  Blood pressure trends improved with Cardene infusion.    Dialysis   Pt was seen on RRT  Modality: Hemodialysis  Access: Arterial Venous Fistula  Location: left upper  QB: 450  QD: 700  UF: 1170      Allergies:  Penicillins, Lamisil [terbinafine], Reglan [metoclopramide], and Stadol [butorphanol]    Home Meds:  Medications Prior to Admission   Medication Sig Dispense Refill Last Dose   • acetaminophen (TYLENOL) 325 MG tablet Take 2 tablets by mouth Every 6 (Six) Hours As Needed for Mild Pain .      • albuterol (PROVENTIL HFA;VENTOLIN HFA) 108 (90 BASE) MCG/ACT inhaler Inhale 2 puffs Every 4 (Four) Hours As Needed for wheezing.      • carvedilol (COREG) 25 MG  tablet Take 1 tablet by mouth 2 (Two) Times a Day With Meals. 60 tablet 3    • cloNIDine (CATAPRES) 0.3 MG tablet Take 1 tablet by mouth Every 8 (Eight) Hours. 90 tablet 3    • DULoxetine (CYMBALTA) 60 MG capsule Take 60 mg by mouth daily.      • famotidine (PEPCID) 20 MG tablet Take 20 mg by mouth 2 (Two) Times a Day.      • hydrALAZINE (APRESOLINE) 50 MG tablet Take 1.5 tablets by mouth 3 (Three) Times a Day. 120 tablet 3    • insulin aspart (novoLOG) 100 UNIT/ML injection Inject 10 Units under the skin into the appropriate area as directed 3 (Three) Times a Day Before Meals.      • Insulin Glargine (BASAGLAR KWIKPEN) 100 UNIT/ML injection pen Inject 40 Units under the skin into the appropriate area as directed Every Night.      • isosorbide mononitrate (IMDUR) 60 MG 24 hr tablet Take 1 tablet by mouth Daily.      • levothyroxine (SYNTHROID, LEVOTHROID) 50 MCG tablet Take 1 tablet by mouth Daily. 30 tablet 3    • lisinopril (PRINIVIL,ZESTRIL) 20 MG tablet Take 20 mg by mouth 2 (Two) Times a Day.      • nicotine (NICODERM CQ) 21 MG/24HR patch Place 1 patch on the skin as directed by provider Daily.      • NIFEdipine XL (PROCARDIA XL) 60 MG 24 hr tablet Take 60 mg by mouth 2 (two) times a day.      • rOPINIRole (REQUIP) 4 MG tablet Take 4 mg by mouth Every Night.      • simvastatin (ZOCOR) 20 MG tablet Take 40 mg by mouth Daily.      • spironolactone (ALDACTONE) 25 MG tablet Take 1 tablet by mouth Daily.          Medicines:  Current Facility-Administered Medications   Medication Dose Route Frequency Provider Last Rate Last Admin   • [START ON 3/6/2022] cefTRIAXone (ROCEPHIN) 1 g/100 mL 0.9% NS (MBP)  1 g Intravenous Q24H Heath Prado MD       • chlorhexidine (PERIDEX) 0.12 % solution 15 mL  15 mL Mouth/Throat Q12H Heath Prado MD       • dextrose (D50W) (25 g/50 mL) IV injection 12.5 g  12.5 g Intravenous PRN Heath Prado MD       • dextrose 5 % and sodium chloride 0.45 % infusion  50  mL/hr Intravenous Continuous PRN Heath Prado MD       • dextrose 5 % and sodium chloride 0.45 % with KCl 20 mEq/L infusion  50 mL/hr Intravenous Continuous PRN Heath Prado MD       • dextrose 5 % and sodium chloride 0.9 % infusion  50 mL/hr Intravenous Continuous PRN Heath Prado MD       • dextrose 5 % and sodium chloride 0.9 % with KCl 20 mEq/L infusion  50 mL/hr Intravenous Continuous PRN Heath Prado MD       • enoxaparin (LOVENOX) syringe 30 mg  30 mg Subcutaneous Q24H Heath Prado MD   30 mg at 03/05/22 1303   • famotidine (PEPCID) injection 20 mg  20 mg Intravenous Daily Heath Prado MD   20 mg at 03/05/22 1304   • insulin regular (HumuLIN R,NovoLIN R) 100 Units in sodium chloride 0.9 % 100 mL (1 Units/mL) infusion  8 Units/hr Intravenous Titrated Heath Prado MD       • ipratropium-albuterol (DUO-NEB) nebulizer solution 3 mL  3 mL Nebulization 4x Daily - RT Heath Prado MD       • LORazepam (ATIVAN) injection 1 mg  1 mg Intravenous Q4H PRN Heath Prado MD   1 mg at 03/05/22 1303   • Morphine sulfate (PF) injection 2 mg  2 mg Intravenous Q4H PRN Heath Prado MD   2 mg at 03/05/22 1303   • niCARdipine (CARDENE) 25 mg in 250 mL NS infusion  5-15 mg/hr Intravenous Titrated Heath Prado MD   Held at 03/05/22 1434   • ondansetron (ZOFRAN) injection 4 mg  4 mg Intravenous Q6H PRN Heath Prado MD       • propofol (DIPRIVAN) infusion 10 mg/mL 100 mL  5-50 mcg/kg/min Intravenous Titrated Heath Prado MD 24.2 mL/hr at 03/05/22 1302 50 mcg/kg/min at 03/05/22 1302   • sodium chloride 0.45 % infusion  50 mL/hr Intravenous Continuous PRN Heath Prado MD       • sodium chloride 0.45 % with KCl 20 mEq/L infusion  50 mL/hr Intravenous Continuous PRN Heath Prado MD       • sodium chloride 0.9 % flush 10 mL  10 mL Intravenous Q12H Heath Prado MD       • sodium chloride 0.9 % flush  10 mL  10 mL Intravenous PRN Heath Prado MD       • sodium chloride 0.9 % flush 10 mL  10 mL Intravenous Once PRN Heath Prado MD       • sodium chloride 0.9 % flush 10 mL  10 mL Intravenous Q12H Heath Prado MD   10 mL at 03/05/22 1303   • sodium chloride 0.9 % flush 10 mL  10 mL Intravenous PRN Heath Prado MD       • sodium chloride 0.9 % infusion  50 mL/hr Intravenous Continuous PRN Heath Prado MD       • sodium chloride 0.9 % infusion  10 mL/hr Intravenous Continuous PRN Heath Prado MD       • sodium chloride 0.9 % with KCl 20 mEq/L infusion  50 mL/hr Intravenous Continuous PRN Heath Prado MD           Past Medical History:  Past Medical History:   Diagnosis Date   • Anxiety    • Arthritis    • Chronic kidney disease     STAGE V RENAL FAILURE   • Depression    • Diabetes mellitus (HCC)    • HTN (hypertension)    • Hypothyroidism    • Obesity    • Tremors of nervous system        Past Surgical History:  Past Surgical History:   Procedure Laterality Date   • ARTERIOVENOUS FISTULA     • BREAST BIOPSY     • CARPAL TUNNEL RELEASE     • CATH LAB PROCEDURE     • CHOLECYSTECTOMY     • TUBAL ABDOMINAL LIGATION         Family History  Family History   Problem Relation Age of Onset   • Cancer Other    • Hypertension Other    • Kidney disease Other    • Heart disease Other    • Diabetes Other    • Diabetes Mother    • Diabetes Maternal Aunt    • Diabetes Maternal Grandmother        Social History  Social History     Socioeconomic History   • Marital status:    Tobacco Use   • Smoking status: Current Every Day Smoker     Packs/day: 0.50   • Smokeless tobacco: Never Used   • Tobacco comment: CUTTING BACK AND TRYING TO STOP SMOKING   Substance and Sexual Activity   • Alcohol use: No   • Drug use: No   • Sexual activity: Defer         Review of Systems:  History obtained from unobtainable from patient due to mental status        Objective:  Patient  "Vitals for the past 24 hrs:   BP Temp Temp src Pulse Resp SpO2 Height Weight   03/05/22 1430 105/66 -- -- 76 -- 100 % -- --   03/05/22 1415 110/68 -- -- 76 -- 100 % -- --   03/05/22 1400 119/68 -- -- 75 -- 100 % -- --   03/05/22 1345 128/63 -- -- 77 -- 100 % -- --   03/05/22 1330 127/64 -- -- 77 -- 98 % -- --   03/05/22 1315 125/74 -- -- 77 -- 100 % -- --   03/05/22 1300 134/67 -- -- 82 -- 100 % -- --   03/05/22 1245 138/69 -- -- 86 -- 100 % -- --   03/05/22 1230 140/70 -- -- 87 -- 100 % -- --   03/05/22 1215 (!) 133/105 -- -- 88 -- 100 % -- --   03/05/22 1200 -- 97.8 °F (36.6 °C) Axillary -- -- -- 157.5 cm (62\") 75.5 kg (166 lb 7.2 oz)   03/05/22 1149 174/96 97.1 °F (36.2 °C) Rectal 97 18 100 % -- --   03/05/22 1134 (!) 195/83 -- -- 94 20 100 % -- --   03/05/22 1124 (!) 201/87 -- -- 96 20 99 % -- --   03/05/22 1109 (!) 192/88 -- -- 97 18 100 % -- --   03/05/22 1047 (!) 190/92 -- -- 92 19 99 % -- --   03/05/22 1038 (!) 232/92 -- -- 100 18 99 % -- --   03/05/22 1018 -- -- -- 97 18 100 % -- --   03/05/22 1017 -- 98.4 °F (36.9 °C) Rectal -- -- -- -- --   03/05/22 1007 (!) 257/120 -- -- 100 16 100 % 157.5 cm (62\") 80.7 kg (178 lb)     No intake or output data in the 24 hours ending 03/05/22 1447  General: vent via ET and unable to participate in the history and physical examination  Chest:  clear to auscultation bilaterally without respiratory distress  CVS: regular rate and rhythm  Abdominal: soft, nontender, positive bowel sounds  Extremities: no cyanosis or edema  Skin: warm and dry without rash      Labs:  Results from last 7 days   Lab Units 03/05/22  1237 03/05/22  1010   WBC 10*3/mm3 8.12 7.17   HEMOGLOBIN g/dL 9.1* 10.7*   HEMATOCRIT % 28.7* 33.7*   PLATELETS 10*3/mm3 151 193         Results from last 7 days   Lab Units 03/05/22  1237 03/05/22  1042   SODIUM mmol/L 120* 119*   POTASSIUM mmol/L 3.9 4.3   CHLORIDE mmol/L 83* 82*   CO2 mmol/L 21.0* 21.0*   BUN mg/dL 51* 49*   CREATININE mg/dL 4.80* 4.66*   CALCIUM " mg/dL 8.4* 8.4*   EGFR mL/min/1.73 10.3* 10.6*   BILIRUBIN mg/dL  --  0.4   ALK PHOS U/L  --  138*   ALT (SGPT) U/L  --  23   AST (SGOT) U/L  --  38*   GLUCOSE mg/dL 921* 972*       Radiology:   Imaging Results (Last 72 Hours)     Procedure Component Value Units Date/Time    CT Cervical Spine Without Contrast [[id number]] Collected: 03/05/22 1047     Updated: 03/05/22 1054    Narrative:      EXAMINATION: CT CERVICAL SPINE WO CONTRAST- 3/5/2022 10:47 AM CST     HISTORY: Neck pain, acute, no red flags; R41.82-Altered mental status,  unspecified. Patient found on floor unresponsive. Unwitnessed fall.     DOSE: 362 mGycm (Automatic exposure control technique was implemented in  an effort to keep the radiation dose as low as possible without  compromising image quality)     REPORT: Spiral CT of the cervical spine was performed without contrast,  reconstructed coronal and sagittal images are also reviewed.     COMPARISON: There are no correlative imaging studies for comparison.     Sagittal images demonstrate straightening of the cervical curvature  without spondylolisthesis. There is moderate degenerative disc disease  at C6-7 with endplate spurring and mild disc space narrowing. No  fracture is identified. The patient is intubated and there is soft  tissue swelling in the hypopharynx. There is normal aeration of the  mastoid air cells. There is mild right-sided neuroforaminal narrowing at  C6-7 related to uncinate spurs. There is mild spinal stenosis at C6-7.  Soft tissue windows show no gross evidence of a traumatic cervical disc  herniation. The visualized upper lungs are clear. There is moderately  heavy calcified plaque within the carotid arteries at the bifurcation.  The       Impression:      No fracture, no acute osseous cervical spine abnormality.  Partial visualization of the endotracheal tube, with soft tissue  swelling in the hypopharynx, not unexpected. Moderate spondylosis at  C6-7, with mild spinal stenosis  right-sided neuroforaminal narrowing..              This report was finalized on 03/05/2022 10:51 by Dr. Farhad Munoz MD.    CT Head Without Contrast [[id number]] Collected: 03/05/22 1044     Updated: 03/05/22 1049    Narrative:      EXAMINATION: CT HEAD WO CONTRAST- 3/5/2022 10:44 AM CST     HISTORY: Delirium; R41.82-Altered mental status, unspecified.  Unresponsive.     DOSE: 625 mGycm (Automatic exposure control technique was implemented in  an effort to keep the radiation dose as low as possible without  compromising image quality)     REPORT: Spiral CT of the head was performed without contrast,  reconstructed coronal and sagittal images are also reviewed.     COMPARISON: CT head without contrast 10/5/2021.     No intracranial hemorrhage, mass or mass effect is identified. There is  mildly decreased attenuation in the periventricular white matter tracts  as before, compatible with mild chronic small vessel white matter  ischemic disease. The ventricles and basal cisterns are within normal  limits. Review of bone windows is negative for fractures. There soft  tissue swelling in the posterior nasopharynx which may be related to  recent endotracheal tube insertion.       Impression:      No acute intracranial abnormality. Mild chronic small vessel  white matter ischemic changes appear stable.     This report was finalized on 03/05/2022 10:46 by Dr. Farhad Munoz MD.    XR Chest 1 View [[id number]] Collected: 03/05/22 1035     Updated: 03/05/22 1040    Narrative:      EXAMINATION: XR CHEST 1 VW- 3/5/2022 10:35 AM CST     HISTORY: intubation; R41.82-Altered mental status, unspecified.     REPORT: A frontal view of the chest was obtained.     COMPARISON: Chest x-ray 10/4/2021.     The patient is intubated, the tip of the endotracheal tube appears in  satisfactory position, approximately 2.8 cm superior to the jessie.  There are interstitial infiltrates in the left midlung and lower lung  zones, with associated  volume loss. The right lung is clear. There is  mild cardiomegaly, this may be exaggerated by technique and positioning.  The patient is on a backboard. There is a stent in the left subclavian  and axillary region as before. No pneumothorax or pleural effusion is  identified. The upper abdomen appears unremarkable.       Impression:      Satisfactory position of the endotracheal tube. Interstitial  infiltrates are seen throughout most of the left lung without  consolidation. There is mild cardiomegaly, without evidence of overt  CHF.  This report was finalized on 03/05/2022 10:37 by Dr. Farhad Munoz MD.          Culture:  No results found for: BLOODCX, URINECX, WOUNDCX, MRSACX, RESPCX, STOOLCX      Assessment   End-stage renal disease  Diabetes type 2 with diabetic nephropathy  Hypertension  Hyperosmolar hyperglycemic on admission  Hyponatremia due to volume overload/profound hyperglycemia  Metabolic acidosis  Acute metabolic encephalopathy  Anemia in chronic kidney disease  Secondary hyperparathyroidism  Hypothyroidism     Plan:  Hemodialysis Tuesday Thursday Saturday per routine scheduling  Monitor labs  Ultrafiltration as tolerated with dialysis 3/5  Continue to adjust bp meds as needed including continuous infusion  Discussed with nursing, Dr. Prado            Thank you for the consult, we appreciate the opportunity to provide care to your patients.  Feel free to contact me if I can be of any further assistance.      Surjit Avitia MD  3/5/2022  14:47 CST

## 2022-03-05 NOTE — ED NOTES
The following fall interventions were initiated:    [] Patient and/or family given education   [] Call light within reach and educated on how to use   [x] Bed rails up per protocol    [x] Bed locked and in the lowest position   [] Bed alarm set and on loudest setting   [x] Fall wrist band applied   [] Non skid footwear applied   [x] Room free of clutter   [x] Patient items within reach   [x] Adequate lighting provided  [x] Falls sign present    [x] Patient moved closer to nursing station   [] Restraints applied        Delia Kelly, RN  03/05/22 4811

## 2022-03-05 NOTE — PLAN OF CARE
Goal Outcome Evaluation:           Progress: no change  Outcome Evaluation: Initial RDN eval. See consult note for details and recommendations for enteral support if desired.

## 2022-03-05 NOTE — SIGNIFICANT NOTE
PT TRANSPORTED TO CT BAGGED WITH 100% O2. ET TUBE PRIOR TO LEAVING AT 23[facility], UPON ARRIVAL TO CT AT 23[facility], AND RETURNED TO ER-3 STILL AT 23[facility].  ETCO2 STABLE AROUND 40MMHG AND PULSE OX STABLE AROUND 100%.  PT RETURNED TO ER-3 AND PLACED ON SAME VENT SETTINGS.  NO COMPLICATIONS.

## 2022-03-05 NOTE — ED PROVIDER NOTES
Emergency Medicine Provider Note    Subjective:    HISTORY OF PRESENT ILLNESS     This is a 52-year-old lady with a reported past medical history of ESRD.  Today care transit was reportedly picking the patient up today and she was found at home unresponsive.  Her last known well was last night.  EMS was called.  They found the patient with a GCS of 3.  Checked a glucose and it read as high.  They placed an IO.  They placed an LMA.  Somewhere during transport the patient began to fletcher her LMA and was given approximately 70 mg of ketamine.  Of note, EMS states she had a tongue laceration so they had concern for seizure.  Patient has no reported history of seizures.  Upon arrival here patient unable to provide history.  GCS of 3.  Per medical chart review she has CKD, anxiety, hypothyroidism, diabetes, depression, obesity, hypertension.      History is obtained from EMS.       Review of Systems: Unable to obtain due to altered mental status.    Past Medical History:  Past Medical History:   Diagnosis Date   • Anxiety    • Arthritis    • Chronic kidney disease     STAGE V RENAL FAILURE   • Depression    • Diabetes mellitus (HCC)    • HTN (hypertension)    • Hypothyroidism    • Obesity    • Tremors of nervous system        Allergies:  Allergies   Allergen Reactions   • Penicillins Hives and Shortness Of Breath   • Lamisil [Terbinafine]    • Reglan [Metoclopramide] GI Intolerance   • Stadol [Butorphanol] Nausea And Vomiting       Past Surgical History:  Past Surgical History:   Procedure Laterality Date   • ARTERIOVENOUS FISTULA     • BREAST BIOPSY     • CARPAL TUNNEL RELEASE     • CATH LAB PROCEDURE     • CHOLECYSTECTOMY     • TUBAL ABDOMINAL LIGATION         Family History:  Family History   Problem Relation Age of Onset   • Cancer Other    • Hypertension Other    • Kidney disease Other    • Heart disease Other    • Diabetes Other    • Diabetes Mother    • Diabetes Maternal Aunt    • Diabetes Maternal Grandmother         Social History:  Social History     Socioeconomic History   • Marital status:    Tobacco Use   • Smoking status: Current Every Day Smoker     Packs/day: 0.50   • Smokeless tobacco: Never Used   • Tobacco comment: CUTTING BACK AND TRYING TO STOP SMOKING   Substance and Sexual Activity   • Alcohol use: No   • Drug use: No   • Sexual activity: Defer       Home Medications:  Prior to Admission medications    Medication Sig Start Date End Date Taking? Authorizing Provider   acetaminophen (TYLENOL) 325 MG tablet Take 2 tablets by mouth Every 6 (Six) Hours As Needed for Mild Pain . 10/12/21   Harrison Moya MD   albuterol (PROVENTIL HFA;VENTOLIN HFA) 108 (90 BASE) MCG/ACT inhaler Inhale 2 puffs Every 4 (Four) Hours As Needed for wheezing.    Blanche Neil MD   carvedilol (COREG) 25 MG tablet Take 1 tablet by mouth 2 (Two) Times a Day With Meals. 9/18/21   Alejandra Kruse APRN   cloNIDine (CATAPRES) 0.3 MG tablet Take 1 tablet by mouth Every 8 (Eight) Hours. 9/18/21   Alejandra Kruse APRN   DULoxetine (CYMBALTA) 60 MG capsule Take 60 mg by mouth daily.    Blanche Neil MD   famotidine (PEPCID) 20 MG tablet Take 20 mg by mouth 2 (Two) Times a Day.    Blanche Neil MD   hydrALAZINE (APRESOLINE) 50 MG tablet Take 1.5 tablets by mouth 3 (Three) Times a Day. 9/18/21   Alejandra Kruse APRN   insulin aspart (novoLOG) 100 UNIT/ML injection Inject 10 Units under the skin into the appropriate area as directed 3 (Three) Times a Day Before Meals.    Blanche Neil MD   Insulin Glargine (BASAGLAR KWIKPEN) 100 UNIT/ML injection pen Inject 40 Units under the skin into the appropriate area as directed Every Night.    Blanche Neil MD   isosorbide mononitrate (IMDUR) 60 MG 24 hr tablet Take 1 tablet by mouth Daily. 10/13/21   Harrison Moya MD   levothyroxine (SYNTHROID, LEVOTHROID) 50 MCG tablet Take 1 tablet by mouth Daily. 9/18/21   Alejandra Kruse  APRN   lisinopril (PRINIVIL,ZESTRIL) 20 MG tablet Take 20 mg by mouth 2 (Two) Times a Day.    ProviderBlanche MD   nicotine (NICODERM CQ) 21 MG/24HR patch Place 1 patch on the skin as directed by provider Daily.    Blanche Neil MD   NIFEdipine XL (PROCARDIA XL) 60 MG 24 hr tablet Take 60 mg by mouth 2 (two) times a day.    Blanche Neil MD   rOPINIRole (REQUIP) 4 MG tablet Take 4 mg by mouth Every Night.    Blanche Neil MD   simvastatin (ZOCOR) 20 MG tablet Take 40 mg by mouth Daily.    Blanche Neil MD   spironolactone (ALDACTONE) 25 MG tablet Take 1 tablet by mouth Daily. 10/13/21   Harrison Moya MD         Objective:    PHYSICAL EXAM     Vitals:   Vitals:    03/05/22 1645   BP: 106/65   Pulse: 79   Resp:    Temp:    SpO2: 100%     GENERAL: Unresponsive.  HEENT: Moist mucous membranes, oropharynx clear without lesions, exudates, thrush.  There is bleeding from the tongue.  EYES: No scleral icterus, conjunctivae clear.  Pupils are 2 mm and minimally reactive bilaterally.  NECK: No cervical lymphadenopathy, no stiffness.  Patient is in c-collar.  CARDIAC: Normal rate, regular rhythm, no murmurs, 2+ peripheral pulses in all four extremities, normal capillary refill.   PULMONARY: Bilateral breath sounds via bag-valve-mask ventilation.  ABDOMINAL: Normal bowel sounds, abdomen is soft, non-tender, non-distended, no hepatomegaly or splenomegaly.   MUSCULOSKELETAL: Normal range of motion, no lower extremity edema.  Interosseous device in the left tibia.  NEUROLOGIC: Patient initially GCS 3.  When medications were pushed through the IO she did briefly move all 4 extremities.  SKIN: Warm and dry without rashes.   PSYCHIATRIC: Unable to obtain.    PROCEDURES     Critical Care  Performed by: Arthur Huff MD  Authorized by: Arthur Huff MD     Critical care provider statement:     Critical care time (minutes):  37    Critical care start time:   3/5/2022 10:00 AM    Critical care end time:  3/5/2022 11:34 AM    Critical care time was exclusive of:  Separately billable procedures and treating other patients    Critical care was necessary to treat or prevent imminent or life-threatening deterioration of the following conditions:  CNS failure or compromise and respiratory failure    Critical care was time spent personally by me on the following activities:  Blood draw for specimens, discussions with consultants, evaluation of patient's response to treatment, examination of patient, ordering and performing treatments and interventions, ordering and review of laboratory studies, ordering and review of radiographic studies, pulse oximetry, re-evaluation of patient's condition and review of old charts    Care discussed with: admitting provider    Comments:      CRITICAL CARE ATTESTATION:  I provided 37 minutes of non-continuous critical care time, excluding procedures, evaluating this patient's critical illness and devoting time to direct management, repeat assessments, review of the labs, discussing their care with consultants, and documenting in the record.     INTUBATED/NIPPV: This patient required intubation and ventilatory management.  I personally monitored vital signs and adjusted ventilator settings to maximize the patient's respiratory status.     RESPIRATORY: This patient was at risk for worsening of the respiratory system and potential respiratory collapse if no intervention was done.  I personally provided repeat exams before and after interventions and monitored the vital signs and oxygen saturations.     CARDIAC/HEMODYNAMIC: This patient was at high risk of cardiovascular collapse without immediate and repeated interventions.  I personally provided repeat exams, monitored vital signs, and provided appropriate interventions as were needed.     NEUROLOGIC: This patient was at risk for neurologic complications associated with the presenting problem.  I  personally provided repeat examinations, monitored vital signs.   Intubation    Date/Time: 3/5/2022 10:18 AM  Performed by: Arthur Huff MD  Authorized by: Arthur Huff MD     Consent:     Consent obtained:  Emergent situation  Pre-procedure details:     Indication: failure to protect airway      Patient status:  Unresponsive    Look externally: no concerns      Mouth opening - incisor distance:  3 or more finger widths    Hyoid-mental distance: 3 or more finger widths      Hyoid-thyroid distance: 2 or more finger widths      Mallampati score:  I    Obstruction: none      Neck mobility: reduced (C-collar)      Induction agents:  Etomidate    Paralytics:  Rocuronium  Procedure details:     Preoxygenation:  Supraglottic device    CPR in progress: no      Intubation method:  Oral    Intubation technique: video assisted      Laryngoscope blade:  Mac 4    Bougie used: no      Grade view: II      Tube size (mm):  7.5    Tube type:  Cuffed    Number of attempts:  1    Ventilation between attempts: no      Tube visualized through cords: yes    Placement assessment:     ETT at teeth/gumline (cm):  25    Tube secured with:  ETT luna    Breath sounds:  Equal and absent over the epigastrium    Placement verification: chest rise, CXR verification, direct visualization, equal breath sounds, ETCO2 detector and tube exhalation      CXR findings:  Mainstem    Tube repositioned: yes    Post-procedure details:     Procedure completion:  Tolerated well, no immediate complications        LAB AND RADIOLOGY RESULTS     Lab Results (last 24 hours)     Procedure Component Value Units Date/Time    CBC & Differential [[id number]]  (Abnormal) Collected: 03/05/22 1010    Specimen: Blood Updated: 03/05/22 1019    Narrative:      The following orders were created for panel order CBC & Differential.  Procedure                               Abnormality         Status                     ---------                                -----------         ------                     CBC Auto Differential[[id number]]        Abnormal            Final result                 Please view results for these tests on the individual orders.    Protime-INR [[id number]]  (Normal) Collected: 03/05/22 1010    Specimen: Blood Updated: 03/05/22 1028     Protime 13.0 Seconds      INR 1.02    aPTT [[id number]]  (Normal) Collected: 03/05/22 1010    Specimen: Blood Updated: 03/05/22 1028     PTT 31.4 seconds     Blood Culture - Blood, Arm, Left [[id number]] Collected: 03/05/22 1010    Specimen: Blood from Arm, Left Updated: 03/05/22 1047    Lactic Acid, Plasma [[id number]]  (Normal) Collected: 03/05/22 1010    Specimen: Blood Updated: 03/05/22 1033     Lactate 1.7 mmol/L     CBC Auto Differential [[id number]]  (Abnormal) Collected: 03/05/22 1010    Specimen: Blood Updated: 03/05/22 1019     WBC 7.17 10*3/mm3      RBC 3.68 10*6/mm3      Hemoglobin 10.7 g/dL      Hematocrit 33.7 %      MCV 91.6 fL      MCH 29.1 pg      MCHC 31.8 g/dL      RDW 17.4 %      RDW-SD 59.7 fl      MPV 11.0 fL      Platelets 193 10*3/mm3      Neutrophil % 77.4 %      Lymphocyte % 13.8 %      Monocyte % 4.7 %      Eosinophil % 3.1 %      Basophil % 0.7 %      Immature Grans % 0.3 %      Neutrophils, Absolute 5.55 10*3/mm3      Lymphocytes, Absolute 0.99 10*3/mm3      Monocytes, Absolute 0.34 10*3/mm3      Eosinophils, Absolute 0.22 10*3/mm3      Basophils, Absolute 0.05 10*3/mm3      Immature Grans, Absolute 0.02 10*3/mm3      nRBC 0.0 /100 WBC     Hemoglobin A1c [[id number]]  (Abnormal) Collected: 03/05/22 1010    Specimen: Blood Updated: 03/05/22 1230     Hemoglobin A1C 9.70 %     Narrative:      Hemoglobin A1C Ranges:    Increased Risk for Diabetes  5.7% to 6.4%  Diabetes                     >= 6.5%  Diabetic Goal                < 7.0%    COVID-19,[facility] IN-HOUSE,Nasal Swab No Transport Media 3-4 HR TAT - Swab, Nasal Cavity [[id number]]  (Normal) Collected: 03/05/22 1016    Specimen: Swab  from Nasal Cavity Updated: 03/05/22 1103     COVID19 Not Detected    Narrative:      Fact sheet for providers: https://www.fda.gov/media/265728/download     Fact sheet for patients: https://www.fda.gov/media/703680/download    Test performed by PCR.    Consider negative results in combination with clinical observations, patient history, and epidemiological information.    Urinalysis With Culture If Indicated - Urine, Catheter [[id number]]  (Abnormal) Collected: 03/05/22 1018    Specimen: Urine, Catheter Updated: 03/05/22 1048     Color, UA Yellow     Appearance, UA Clear     pH, UA 6.5     Specific Gravity, UA 1.022     Glucose, UA >=1000 mg/dL (3+)     Ketones, UA Negative     Bilirubin, UA Negative     Blood, UA Small (1+)     Protein,  mg/dL (2+)     Leuk Esterase, UA Moderate (2+)     Nitrite, UA Negative     Urobilinogen, UA 0.2 E.U./dL    Urinalysis, Microscopic Only - Urine, Catheter [[id number]]  (Abnormal) Collected: 03/05/22 1018    Specimen: Urine, Catheter Updated: 03/05/22 1048     RBC, UA 0-2 /HPF      WBC, UA 31-50 /HPF      Bacteria, UA 1+ /HPF      Squamous Epithelial Cells, UA 0-2 /HPF      Hyaline Casts, UA None Seen /LPF      WBC Clumps, UA Small/1+ /HPF      Methodology Manual Light Microscopy    Urine Culture - Urine, Urine, Catheter [[id number]] Collected: 03/05/22 1018    Specimen: Urine, Catheter Updated: 03/05/22 1047    Blood Culture - Blood, Arm, Right [[id number]] Collected: 03/05/22 1022    Specimen: Blood from Arm, Right Updated: 03/05/22 1046    Blood Gas, Arterial - [[id number]]  (Abnormal) Collected: 03/05/22 1024    Specimen: Arterial Blood Updated: 03/05/22 1027     Site Right Radial     Aaron's Test Positive     pH, Arterial 7.323 pH units      Comment: 84 Value below reference range        pCO2, Arterial 43.3 mm Hg      pO2, Arterial 256.0 mm Hg      Comment: 83 Value above reference range        HCO3, Arterial 22.4 mmol/L      Base Excess, Arterial -3.5 mmol/L      Comment: 84  Value below reference range        O2 Saturation, Arterial >100.1 %      Comment: 93 Value above reportable range > 100.1        Temperature 37.0 C      Barometric Pressure for Blood Gas 752 mmHg      Modality Ventilator     FIO2 100 %      Ventilator Mode AC     Set Tidal Volume 500     Set Mech Resp Rate 18.0     PEEP 5.0     Collected by [id number]     Comment: Meter: [id number]     :  [id number]        pCO2, Temperature Corrected 43.3 mm Hg      pH, Temp Corrected 7.323 pH Units      pO2, Temperature Corrected 256 mm Hg     Comprehensive Metabolic Panel [[id number]]  (Abnormal) Collected: 03/05/22 1042    Specimen: Blood from Arm, Right Updated: 03/05/22 1125     Glucose 972 mg/dL      BUN 49 mg/dL      Creatinine 4.66 mg/dL      Sodium 119 mmol/L      Potassium 4.3 mmol/L      Chloride 82 mmol/L      CO2 21.0 mmol/L      Calcium 8.4 mg/dL      Total Protein 6.7 g/dL      Albumin 3.50 g/dL      ALT (SGPT) 23 U/L      AST (SGOT) 38 U/L      Alkaline Phosphatase 138 U/L      Total Bilirubin 0.4 mg/dL      Globulin 3.2 gm/dL      A/G Ratio 1.1 g/dL      BUN/Creatinine Ratio 10.5     Anion Gap 16.0 mmol/L      eGFR 10.6 mL/min/1.73      Comment: <15 Indicative of kidney failure       Narrative:      GFR Normal >60  Chronic Kidney Disease <60  Kidney Failure <15      BNP [[id number]]  (Abnormal) Collected: 03/05/22 1042    Specimen: Blood from Arm, Right Updated: 03/05/22 1123     proBNP >70,000.0 pg/mL     Narrative:      Among patients with dyspnea, NT-proBNP is highly sensitive for the detection of acute congestive heart failure. In addition NT-proBNP of <300 pg/ml effectively rules out acute congestive heart failure with 99% negative predictive value.    Results may be falsely decreased if patient taking Biotin.      Troponin [[id number]]  (Abnormal) Collected: 03/05/22 1042    Specimen: Blood from Arm, Right Updated: 03/05/22 1108     Troponin T 0.088 ng/mL     Narrative:      Troponin T Reference  "Range:  <= 0.03 ng/mL-   Negative for AMI  >0.03 ng/mL-     Abnormal for myocardial necrosis.  Clinicians would have to utilize clinical acumen, EKG, Troponin and serial changes to determine if it is an Acute Myocardial Infarction or myocardial injury due to an underlying chronic condition.       Results may be falsely decreased if patient taking Biotin.      Procalcitonin [[id number]]  (Abnormal) Collected: 03/05/22 1042    Specimen: Blood from Arm, Right Updated: 03/05/22 1116     Procalcitonin 0.28 ng/mL     Narrative:      As a Marker for Sepsis (Non-Neonates):    1. <0.5 ng/mL represents a low risk of severe sepsis and/or septic shock.  2. >2 ng/mL represents a high risk of severe sepsis and/or septic shock.    As a Marker for Lower Respiratory Tract Infections that require antibiotic therapy:    PCT on Admission    Antibiotic Therapy       6-12 Hrs later    >0.5                Strongly Recommended  >0.25 - <0.5        Recommended   0.1 - 0.25          Discouraged              Remeasure/reassess PCT  <0.1                Strongly Discouraged     Remeasure/reassess PCT    As 28 day mortality risk marker: \"Change in Procalcitonin Result\" (>80% or <=80%) if Day 0 (or Day 1) and Day 4 values are available. Refer to http://www.[facility]-pct-calculator.com    Change in PCT <=80%  A decrease of PCT levels below or equal to 80% defines a positive change in PCT test result representing a higher risk for 28-day all-cause mortality of patients diagnosed with severe sepsis for septic shock.    Change in PCT >80%  A decrease of PCT levels of more than 80% defines a negative change in PCT result representing a lower risk for 28-day all-cause mortality of patients diagnosed with severe sepsis or septic shock.       C-reactive Protein [[id number]]  (Abnormal) Collected: 03/05/22 1042    Specimen: Blood from Arm, Right Updated: 03/05/22 1111     C-Reactive Protein 8.03 mg/dL     Osmolality, Serum [[id number]]  (Abnormal) Collected: " 03/05/22 1237    Specimen: Blood Updated: 03/05/22 1403     Osmolality 323 mOsm/kg     Basic Metabolic Panel [[id number]]  (Abnormal) Collected: 03/05/22 1237    Specimen: Blood Updated: 03/05/22 1330     Glucose 921 mg/dL      BUN 51 mg/dL      Creatinine 4.80 mg/dL      Sodium 120 mmol/L      Potassium 3.9 mmol/L      Chloride 83 mmol/L      CO2 21.0 mmol/L      Calcium 8.4 mg/dL      BUN/Creatinine Ratio 10.6     Anion Gap 16.0 mmol/L      eGFR 10.3 mL/min/1.73      Comment: <15 Indicative of kidney failure       Narrative:      GFR Normal >60  Chronic Kidney Disease <60  Kidney Failure <15      Magnesium [[id number]]  (Normal) Collected: 03/05/22 1237    Specimen: Blood Updated: 03/05/22 1257     Magnesium 2.4 mg/dL     Phosphorus [[id number]]  (Abnormal) Collected: 03/05/22 1237    Specimen: Blood Updated: 03/05/22 1300     Phosphorus 5.0 mg/dL     Troponin [[id number]]  (Abnormal) Collected: 03/05/22 1237    Specimen: Blood Updated: 03/05/22 1303     Troponin T 0.096 ng/mL     Narrative:      Troponin T Reference Range:  <= 0.03 ng/mL-   Negative for AMI  >0.03 ng/mL-     Abnormal for myocardial necrosis.  Clinicians would have to utilize clinical acumen, EKG, Troponin and serial changes to determine if it is an Acute Myocardial Infarction or myocardial injury due to an underlying chronic condition.       Results may be falsely decreased if patient taking Biotin.      CBC & Differential [[id number]]  (Abnormal) Collected: 03/05/22 1237    Specimen: Blood Updated: 03/05/22 1244    Narrative:      The following orders were created for panel order CBC & Differential.  Procedure                               Abnormality         Status                     ---------                               -----------         ------                     CBC Auto Differential[[id number]]        Abnormal            Final result                 Please view results for these tests on the individual orders.    CBC Auto Differential  [[id number]]  (Abnormal) Collected: 03/05/22 1237    Specimen: Blood Updated: 03/05/22 1244     WBC 8.12 10*3/mm3      RBC 3.20 10*6/mm3      Hemoglobin 9.1 g/dL      Hematocrit 28.7 %      MCV 89.7 fL      MCH 28.4 pg      MCHC 31.7 g/dL      RDW 16.9 %      RDW-SD 56.2 fl      MPV 10.9 fL      Platelets 151 10*3/mm3      Neutrophil % 87.7 %      Lymphocyte % 5.4 %      Monocyte % 4.9 %      Eosinophil % 0.9 %      Basophil % 0.9 %      Immature Grans % 0.2 %      Neutrophils, Absolute 7.12 10*3/mm3      Lymphocytes, Absolute 0.44 10*3/mm3      Monocytes, Absolute 0.40 10*3/mm3      Eosinophils, Absolute 0.07 10*3/mm3      Basophils, Absolute 0.07 10*3/mm3      Immature Grans, Absolute 0.02 10*3/mm3      nRBC 0.0 /100 WBC     POC Glucose Once [[id number]]  (Abnormal) Collected: 03/05/22 1506    Specimen: Blood Updated: 03/05/22 1519     Glucose 497 mg/dL      Comment: : [id number] [facility] ID: [id number]       POC Glucose Once [[id number]]  (Abnormal) Collected: 03/05/22 1618    Specimen: Blood Updated: 03/05/22 1640     Glucose 427 mg/dL      Comment: : ELINARichi Walsh [employer] ID: [id number]       POC Glucose Once [[id number]]  (Abnormal) Collected: 03/05/22 1721    Specimen: Blood Updated: 03/05/22 1733     Glucose 468 mg/dL      Comment: : RAKANSANDYBUSHRARichi Walsh [employer] ID: [id number]             Adult Transthoracic Echo Complete W/ Cont if Necessary Per Protocol    Result Date: 3/5/2022  Narrative: · Left ventricular systolic function is normal. Left ventricular ejection fraction appears to be 61 - 65%. · Left ventricular wall thickness is consistent with mild concentric hypertrophy. · Normal right ventricular cavity size and systolic function noted. · Mild mitral valve stenosis is present. · Trace tricuspid valve regurgitation is present. Estimated right ventricular systolic pressure from tricuspid regurgitation is normal (<35 mmHg).      CT Head Without Contrast    Result Date:  3/5/2022  Narrative: EXAMINATION: CT HEAD WO CONTRAST- 3/5/2022 10:44 AM CST  HISTORY: Delirium; R41.82-Altered mental status, unspecified. Unresponsive.  DOSE: 625 mGycm (Automatic exposure control technique was implemented in an effort to keep the radiation dose as low as possible without compromising image quality)  REPORT: Spiral CT of the head was performed without contrast, reconstructed coronal and sagittal images are also reviewed.  COMPARISON: CT head without contrast 10/5/2021.  No intracranial hemorrhage, mass or mass effect is identified. There is mildly decreased attenuation in the periventricular white matter tracts as before, compatible with mild chronic small vessel white matter ischemic disease. The ventricles and basal cisterns are within normal limits. Review of bone windows is negative for fractures. There soft tissue swelling in the posterior nasopharynx which may be related to recent endotracheal tube insertion.      Impression: No acute intracranial abnormality. Mild chronic small vessel white matter ischemic changes appear stable.  This report was finalized on 03/05/2022 10:46 by Dr. Farhad Munoz MD.    CT Cervical Spine Without Contrast    Result Date: 3/5/2022  Narrative: EXAMINATION: CT CERVICAL SPINE WO CONTRAST- 3/5/2022 10:47 AM CST  HISTORY: Neck pain, acute, no red flags; R41.82-Altered mental status, unspecified. Patient found on floor unresponsive. Unwitnessed fall.  DOSE: 362 mGycm (Automatic exposure control technique was implemented in an effort to keep the radiation dose as low as possible without compromising image quality)  REPORT: Spiral CT of the cervical spine was performed without contrast, reconstructed coronal and sagittal images are also reviewed.  COMPARISON: There are no correlative imaging studies for comparison.  Sagittal images demonstrate straightening of the cervical curvature without spondylolisthesis. There is moderate degenerative disc disease at C6-7 with  endplate spurring and mild disc space narrowing. No fracture is identified. The patient is intubated and there is soft tissue swelling in the hypopharynx. There is normal aeration of the mastoid air cells. There is mild right-sided neuroforaminal narrowing at C6-7 related to uncinate spurs. There is mild spinal stenosis at C6-7. Soft tissue windows show no gross evidence of a traumatic cervical disc herniation. The visualized upper lungs are clear. There is moderately heavy calcified plaque within the carotid arteries at the bifurcation. The      Impression: No fracture, no acute osseous cervical spine abnormality. Partial visualization of the endotracheal tube, with soft tissue swelling in the hypopharynx, not unexpected. Moderate spondylosis at C6-7, with mild spinal stenosis right-sided neuroforaminal narrowing..     This report was finalized on 03/05/2022 10:51 by Dr. Farhad Munoz MD.    XR Chest 1 View    Result Date: 3/5/2022  Narrative: EXAMINATION: XR CHEST 1 VW- 3/5/2022 10:35 AM CST  HISTORY: intubation; R41.82-Altered mental status, unspecified.  REPORT: A frontal view of the chest was obtained.  COMPARISON: Chest x-ray 10/4/2021.  The patient is intubated, the tip of the endotracheal tube appears in satisfactory position, approximately 2.8 cm superior to the jessie. There are interstitial infiltrates in the left midlung and lower lung zones, with associated volume loss. The right lung is clear. There is mild cardiomegaly, this may be exaggerated by technique and positioning. The patient is on a backboard. There is a stent in the left subclavian and axillary region as before. No pneumothorax or pleural effusion is identified. The upper abdomen appears unremarkable.      Impression: Satisfactory position of the endotracheal tube. Interstitial infiltrates are seen throughout most of the left lung without consolidation. There is mild cardiomegaly, without evidence of overt CHF. This report was finalized  on 03/05/2022 10:37 by Dr. Farhad Munoz MD.    XR Abdomen KUB    Result Date: 3/5/2022  Narrative: HISTORY: NG placement  KUB: Frontal view of the lower chest and upper abdomen obtained.  Enteric tube tip is present within the body of the stomach. The side-port at the GE junction. Left retrocardiac opacities. Cholecystectomy clips.      Impression: 1. Enteric tube tip in the body the stomach with side-port at the GE junction. This report was finalized on 03/05/2022 17:23 by Dr. Desire Castro MD.      ED course:    Medications   propofol (DIPRIVAN) infusion 10 mg/mL 100 mL (50 mcg/kg/min × 80.7 kg Intravenous New Bag 3/5/22 1302)   niCARdipine (CARDENE) 25 mg in 250 mL NS infusion (0 mg/hr Intravenous Hold 3/5/22 1434)   sodium chloride 0.9 % flush 10 mL (has no administration in time range)   sodium chloride 0.9 % flush 10 mL (has no administration in time range)   sodium chloride 0.9 % infusion (has no administration in time range)   sodium chloride 0.9 % with KCl 20 mEq/L infusion (has no administration in time range)   dextrose 5 % and sodium chloride 0.9 % infusion (has no administration in time range)   dextrose 5 % and sodium chloride 0.9 % with KCl 20 mEq/L infusion (has no administration in time range)   sodium chloride 0.45 % infusion (has no administration in time range)   sodium chloride 0.45 % with KCl 20 mEq/L infusion (has no administration in time range)   dextrose 5 % and sodium chloride 0.45 % infusion (has no administration in time range)   dextrose 5 % and sodium chloride 0.45 % with KCl 20 mEq/L infusion (has no administration in time range)   insulin regular (HumuLIN R,NovoLIN R) 100 Units in sodium chloride 0.9 % 100 mL (1 Units/mL) infusion (0 Units/hr Intravenous Hold 3/5/22 1624)   dextrose (D50W) (25 g/50 mL) IV injection 12.5 g (has no administration in time range)   sodium chloride 0.9 % flush 10 mL (has no administration in time range)   sodium chloride 0.9 % infusion (has no  administration in time range)   sodium chloride 0.9 % flush 10 mL (10 mL Intravenous Given 3/5/22 1303)   sodium chloride 0.9 % flush 10 mL (has no administration in time range)   enoxaparin (LOVENOX) syringe 30 mg (30 mg Subcutaneous Given 3/5/22 1303)   ondansetron (ZOFRAN) injection 4 mg (has no administration in time range)   chlorhexidine (PERIDEX) 0.12 % solution 15 mL (has no administration in time range)   famotidine (PEPCID) injection 20 mg (20 mg Intravenous Given 3/5/22 1304)   ipratropium-albuterol (DUO-NEB) nebulizer solution 3 mL (3 mL Nebulization Given 3/5/22 1603)   cefTRIAXone (ROCEPHIN) 1 g/100 mL 0.9% NS (MBP) (has no administration in time range)   LORazepam (ATIVAN) injection 1 mg (1 mg Intravenous Given 3/5/22 1303)   Morphine sulfate (PF) injection 2 mg (2 mg Intravenous Given 3/5/22 1303)   etomidate (AMIDATE) injection 30 mg (30 mg Intravenous Given 3/5/22 1002)   rocuronium (ZEMURON) injection 100 mg (100 mg Intravenous Given 3/5/22 1002)   cefTRIAXone (ROCEPHIN) 1 g/100 mL 0.9% NS (MBP) (0 g Intravenous Stopped 3/5/22 1142)   aspirin suppository 300 mg (300 mg Rectal Given 3/5/22 1123)   insulin regular (humuLIN R,novoLIN R) injection 5 Units (5 Units Intravenous Given 3/5/22 1133)          Amount and/or complexity of data reviewed:    • Clinical lab tests ordered and reviewed.  • Tests in the radiology section ordered and reviewed.  • Independent visualization of imaging, tracing, or specimen is remarkable for EKG with sinus rhythm at a rate of 94 with no ST elevation or depression concerning for acute ischemia although there are peaked T waves.  • Discuss the patient with another provider: Hospitalist      Risk of significant complications, morbidity, and/or mortality.    •  Presenting problem: high  •  Diagnostic procedures: high  •  Management options: high        MEDICAL DECISION MAKING     Patient presents with unresponsiveness. Upon arrival to the Emergency Department patient is a  GCS 3.  Glucose read as by EMS.  LMA in place.  This was replaced via endotracheal intubation.  Patient is severely hypertensive.  I have concern for intracranial bleed versus PRES.  Due to this nicardipine is started prior to CT scan.CT scan of the head and C-spine show no acute findings.  Chest x-ray with appropriate positioning of endotracheal tube after positioning.  Labs are remarkable for hyperglycemia, pseudohyponatremia, possible urinary tract infection.  Due to this patient is started on ceftriaxone.  She is on a nicardipine infusion.  She will likely need dialysis today.  I am told the dialysis nurses are already preparing for her.  I discussed with the hospitalist and the patient has been admitted to the ICU in guarded condition.        Diagnosis:    Final diagnoses:   Altered mental status, unspecified altered mental status type         ED Disposition:     ED Disposition     ED Disposition   Decision to Admit    Condition   --    Comment   Level of Care: Critical Care [6]   Diagnosis: Altered mental status, unspecified altered mental status type [[id number]]   Admitting Physician: WINDY RON [[id number]]   Attending Physician: WINDY RON [[id number]]   Certification: I Certify That Inpatient Hospital Services Are Medically Necessary For Greater Than 2 Midnights               No follow-up provider specified.       Medication List      No changes were made to your prescriptions during this visit.              Arthur Huff MD  03/05/22 6246

## 2022-03-06 NOTE — PLAN OF CARE
Goal Outcome Evaluation:   Admitted to ICU.  HD as documented.  Titrated and removed Cardene gtt.  Restraints as documented. Insulin drip as documented.  Labs ordered and completed.  Fluids ordered as documented.   did not come to bedside.  No family here.  PRN medications given as documented.  Vital signs stable.

## 2022-03-06 NOTE — NURSING NOTE
Spoke with patients glucose trending down anion gap closed will stop insullin drip 45 minutes after dose of humalog given per orders

## 2022-03-06 NOTE — PROGRESS NOTES
"Pharmacy Dosing Service  Pharmacokinetics  Vancomycin Initial Evaluation  Assessment/Action/Plan:  Loading dose?: 1250 mg on 3/6/22  //Current Order: Vancomycin 750 mg IVPB every Tuesday, Thursday, Saturday post-dialysis  Current end date:3/10/22  Levels: Random trough ordered for 3/7/22 at 0600  Additional antimicrobial agent(s): Rocephin    Vancomycin dosage initiated based on population pharmacokinetic parameters. Pharmacy will continue to follow daily and adjust dose accordingly.     Subjective:  Ena Upton is a 52 y.o. female with a Vancomycin \"Pharmacy to Dose\" consult for the treatment of Bactermia , day 1 of 3 of treatment.      Objective:  Ht: 157.5 cm (62\"); Wt: 76.1 kg (167 lb 12.3 oz)  Estimated Creatinine Clearance: 19.2 mL/min (A) (by C-G formula based on SCr of 3.24 mg/dL (H)).   Creatinine   Date Value Ref Range Status   03/06/2022 3.24 (H) 0.57 - 1.00 mg/dL Final   03/06/2022 3.09 (H) 0.57 - 1.00 mg/dL Final   03/05/2022 2.91 (H) 0.57 - 1.00 mg/dL Final   01/22/2022 3.7 (H) 0.5 - 0.9 mg/dL Final   01/12/2022 3.4 (H) 0.5 - 0.9 mg/dL Final   01/05/2022 3.9 (H) 0.5 - 0.9 mg/dL Final      Lab Results   Component Value Date    WBC 8.12 03/05/2022    WBC 7.17 03/05/2022    WBC 4.4 (L) 01/27/2022      Baseline culture results:  Microbiology Results (last 10 days)       Procedure Component Value - Date/Time    COVID-19,[facility] IN-HOUSE,Nasal Swab No Transport Media 3-4 HR TAT - Swab, Nasal Cavity [[id number]]  (Normal) Collected: 03/05/22 1016    Lab Status: Final result Specimen: Swab from Nasal Cavity Updated: 03/05/22 1103     COVID19 Not Detected    Narrative:      Fact sheet for providers: https://www.fda.gov/media/908932/download     Fact sheet for patients: https://www.fda.gov/media/497432/download    Test performed by PCR.    Consider negative results in combination with clinical observations, patient history, and epidemiological information.            RAMSEY Noriega [facility]  03/06/22 07:56 " CST

## 2022-03-06 NOTE — SIGNIFICANT NOTE
03/06/22 0842   Readiness to Wean Daily Screen   Daily Screen Complete Y   Daily Screen Outcome pass   Spontaneous Breathing Trial   $ SBT Readiness to Wean yes   Vt Spontaneous (mL) 443 mL   Vital Capacity (mL) 777   Effort (VC) good   RSBI 66   Negative Inspiratory Force (cm H2O) -36   Effort (NIF) good   Resp Rate (Obs) 21

## 2022-03-06 NOTE — NURSING NOTE
Spoke with pt  regarding code status. He stated that patient wishes to remain a FULL CODE. Dr. Dumont updated.

## 2022-03-06 NOTE — PLAN OF CARE
Goal Outcome Evaluation:         Sedated on vent, anion gap closed pt remains on the vent sedated. Safety checks down , pt turned every 2 hours

## 2022-03-06 NOTE — SIGNIFICANT NOTE
03/06/22 0637   Readings   PEEP Intrinsic (cm H2O) 5.3 cm H2O   Plateau Pressure (cm H2O) 19 cm H2O   Static Compliance (L/cm H2O) 26   Dynamic Compliance (L/cm H2O) 41 L/cm H2O

## 2022-03-06 NOTE — CONSULTS
PULMONARY AND CRITICAL CARE CONSULT - [facility]    Ena Upton   MR# [id number]  Acct# [id number]  3/6/2022   11:38 CST    Referring Provider: Heath Prado MD    Chief Complaint: Mechanically ventilated    HPI: We are consulted by Heath Prado MD to see this 52 y.o. female born on 1970.  Patient currently intubated and unable to provide any information.  Sedation is off.  Her eyes are open.  She is following commands.  She is on a breathing trial at this time.  He was admitted to the hospital yesterday after she was brought in by EMS for altered mental status.  Patient's blood sugar was over 900.  She is a routine dialysis patient.  Unknown when she had dialysis recently.  She was placed on Cardene and an insulin drip overnight.  Chart review reflects some issues with hypoglycemia overnight and discontinuation of the drip.  ABGs adequate this morning on review.  Stable bilateral interstitial lung infiltrates.  No known history of lung disease.  No family present.  Discussed with RT at bedside.  We will plan for extubation this morning.    Past Medical History   has a past medical history of Anxiety, Arthritis, Chronic kidney disease, Depression, Diabetes mellitus (HCC), HTN (hypertension), Hypothyroidism, Obesity, and Tremors of nervous system.   has a past surgical history that includes Breast biopsy; AV fistula placement; Cholecystectomy; Carpal tunnel release; Tubal ligation; and cath lab procedure.  Allergies   Allergen Reactions   • Penicillins Hives and Shortness Of Breath   • Lamisil [Terbinafine]    • Reglan [Metoclopramide] GI Intolerance   • Stadol [Butorphanol] Nausea And Vomiting     Medications  atorvastatin, 20 mg, Oral, Nightly  carvedilol, 12.5 mg, Oral, BID With Meals  cefTRIAXone, 1 g, Intravenous, Q24H  chlorhexidine, 15 mL, Mouth/Throat, Q12H  enoxaparin, 30 mg, Subcutaneous, Q24H  famotidine, 20 mg, Intravenous, Daily  hydrALAZINE, 50 mg, Oral,  Q8H  insulin detemir, 20 Units, Subcutaneous, Nightly  insulin lispro, 2-7 Units, Subcutaneous, Q4H  ipratropium-albuterol, 3 mL, Nebulization, 4x Daily - RT  levothyroxine, 50 mcg, Oral, Q AM  lisinopril, 20 mg, Oral, Q12H  sodium chloride, 10 mL, Intravenous, Q12H  sodium chloride, 10 mL, Intravenous, Q12H  [START ON 3/8/2022] vancomycin, 10 mg/kg, Intravenous, Q48H      dextrose 5 % and sodium chloride 0.45 %, 50 mL/hr  dextrose 5 % and sodium chloride 0.45 % with KCl 20 mEq/L, 50 mL/hr  dextrose 5 % and sodium chloride 0.9 %, 50 mL/hr  niCARdipine, 5-15 mg/hr, Last Rate: Stopped (03/05/22 1434)  propofol, 5-50 mcg/kg/min, Last Rate: 40 mcg/kg/min (03/06/22 0608)  sodium chloride, 50 mL/hr  sodium chloride 0.45 % with KCl 20 mEq, 50 mL/hr  sodium chloride, 50 mL/hr  sodium chloride, 10 mL/hr  sodium chloride 0.9 % with KCl 20 mEq, 50 mL/hr, Last Rate: 50 mL/hr (03/05/22 2000)      Social History   reports that she has been smoking. She has been smoking about 0.50 packs per day. She has never used smokeless tobacco. She reports that she does not drink alcohol and does not use drugs.  Family History  family history includes Cancer in an other family member; Diabetes in her maternal aunt, maternal grandmother, mother, and another family member; Heart disease in an other family member; Hypertension in an other family member; Kidney disease in an other family member.  Review of Systems:    Cannot obtain due to mechanical ventilated state    Physical Exam:  Temp:  [96.9 °F (36.1 °C)-99.5 °F (37.5 °C)] 99.5 °F (37.5 °C)  Heart Rate:  [71-97] 71  Resp:  [12-19] 12  BP: (102-174)/() 129/67  FiO2 (%):  [30 %] 30 %    Intake/Output Summary (Last 24 hours) at 3/6/2022 1138  Last data filed at 3/6/2022 0400  Gross per 24 hour   Intake 1353.57 ml   Output 1 ml   Net 1352.57 ml         03/05/22  1200 03/06/22  0400   Weight: 75.5 kg (166 lb 7.2 oz) 76.1 kg (167 lb 12.3 oz) (bed)     SpO2 Percentage    03/06/22 0500  03/06/22 0639 03/06/22 1027   SpO2: 100% 100% 100%     Body mass index is 30.69 kg/m².  Vent Settings        Resp Rate (Set): 18  Pressure Support (cm H2O): 5 cm H20  FiO2 (%): 30 %  PEEP/CPAP (cm H2O): 5 cm H20  Minute Ventilation (L/min) (Obs): 0 L/min  Resp Rate (Observed) Vent: 84     I:E Ratio (Obs): 1.80:1  PIP Observed (cm H2O): 10 cm H2O  Plateau Pressure (cm H2O): 19 cm H2O   RSBI: 66    Physical Exam  Vitals and nursing note reviewed. Exam conducted with a chaperone present.   Constitutional:       Appearance: She is normal weight. She is ill-appearing.      Interventions: She is intubated.   HENT:      Head: Normocephalic and atraumatic.      Right Ear: External ear normal.      Left Ear: External ear normal.      Nose: Nose normal.   Eyes:      General:         Right eye: No discharge.         Left eye: No discharge.      Conjunctiva/sclera: Conjunctivae normal.   Cardiovascular:      Rate and Rhythm: Normal rate and regular rhythm.      Heart sounds: No murmur heard.  Pulmonary:      Effort: Pulmonary effort is normal. She is intubated.   Abdominal:      General: Abdomen is flat. Bowel sounds are normal. There is no distension.      Palpations: Abdomen is soft.   Musculoskeletal:         General: Normal range of motion.      Cervical back: Normal range of motion and neck supple. No rigidity.      Right lower leg: Edema present.      Left lower leg: Edema present.      Comments: Fistula, left upper extremity   Skin:     General: Skin is warm and dry.      Coloration: Skin is not jaundiced or pale.   Neurological:      Mental Status: She is alert.      Comments: Intubated, nodding appropriately, following commands       Electronically signed by GAEL Carpenter, 3/6/2022, 11:38 CST      Physician Substantive Portion: Medical Decision Making:  Results from last 7 days   Lab Units 03/05/22  1237 03/05/22  1010   WBC 10*3/mm3 8.12 7.17   HEMOGLOBIN g/dL 9.1* 10.7*   PLATELETS 10*3/mm3 151 193      Results from last 7 days   Lab Units 03/06/22  0420 03/06/22  0024 03/05/22 2000   SODIUM mmol/L 134* 132* 132*   POTASSIUM mmol/L 3.8 3.2* 3.0*   CO2 mmol/L 24.0 25.0 26.0   BUN mg/dL 29* 28* 26*   CREATININE mg/dL 3.24* 3.09* 2.91*   MAGNESIUM mg/dL 2.1 2.0 2.1   PHOSPHORUS mg/dL 3.2 3.1 2.8   GLUCOSE mg/dL 66 188* 378*     Results from last 7 days   Lab Units 03/06/22  0310 03/05/22  1024   PH, ARTERIAL pH units 7.421 7.323*   PCO2, ARTERIAL mm Hg 43.3 43.3   PO2 ART mm Hg 110.0* 256.0*   FIO2 % 30 100     Blood Culture   Date Value Ref Range Status   03/05/2022 No growth at 24 hours  Preliminary     Urine Culture   Date Value Ref Range Status   03/05/2022 50,000 CFU/mL Staphylococcus, coagulase negative (A)  Final     Comment:     By laboratory criteria, addititional testing is not indicated and unlikely to produce clinically relevant information.  Coagulase negative staphylococci are common skin contaminants, members of the oral lilly,  and of low virulence; requires clinical correlation.       Lab Results   Component Value Date    PROBNP >70,000.0 (H) 03/05/2022     Recent radiology:   Imaging Results (Last 72 Hours)     Procedure Component Value Units Date/Time    XR Chest 1 View [[id number]] Collected: 03/06/22 0822     Updated: 03/06/22 0826    Narrative:      EXAMINATION: XR CHEST 1 VW- 3/6/2022 8:22 AM CST     HISTORY: Intubated Patient; R41.82-Altered mental status, unspecified.     REPORT: A frontal view of the chest was obtained.     COMPARISON: Chest x-ray 3/5/2022 1002 hours.     The endotracheal tube and NG tube appear in satisfactory position , the  right lung is clear, there are interstitial infiltrates in the left  medial lung extending into the left lung base, with there is also  probable atelectasis. No pneumothorax or pleural effusion is identified.  Vascular stents are noted in the left subclavian region, left upper arm.       Impression:      Interval insertion of an NG tube, in  satisfactory position.  The endotracheal tube remains in good position. No change in patchy  interstitial infiltrates in the left lung and streaky atelectasis in the  left lung base.  This report was finalized on 03/06/2022 08:23 by Dr. Farhad Munoz MD.    XR Abdomen KUB [[id number]] Collected: 03/05/22 1723     Updated: 03/05/22 1726    Narrative:      HISTORY: NG placement     KUB: Frontal view of the lower chest and upper abdomen obtained.     Enteric tube tip is present within the body of the stomach. The  side-port at the GE junction. Left retrocardiac opacities.  Cholecystectomy clips.       Impression:      1. Enteric tube tip in the body the stomach with side-port at the GE  junction.  This report was finalized on 03/05/2022 17:23 by Dr. Desire Castro MD.    CT Cervical Spine Without Contrast [[id number]] Collected: 03/05/22 1047     Updated: 03/05/22 1054    Narrative:      EXAMINATION: CT CERVICAL SPINE WO CONTRAST- 3/5/2022 10:47 AM CST     HISTORY: Neck pain, acute, no red flags; R41.82-Altered mental status,  unspecified. Patient found on floor unresponsive. Unwitnessed fall.     DOSE: 362 mGycm (Automatic exposure control technique was implemented in  an effort to keep the radiation dose as low as possible without  compromising image quality)     REPORT: Spiral CT of the cervical spine was performed without contrast,  reconstructed coronal and sagittal images are also reviewed.     COMPARISON: There are no correlative imaging studies for comparison.     Sagittal images demonstrate straightening of the cervical curvature  without spondylolisthesis. There is moderate degenerative disc disease  at C6-7 with endplate spurring and mild disc space narrowing. No  fracture is identified. The patient is intubated and there is soft  tissue swelling in the hypopharynx. There is normal aeration of the  mastoid air cells. There is mild right-sided neuroforaminal narrowing at  C6-7 related to uncinate spurs.  There is mild spinal stenosis at C6-7.  Soft tissue windows show no gross evidence of a traumatic cervical disc  herniation. The visualized upper lungs are clear. There is moderately  heavy calcified plaque within the carotid arteries at the bifurcation.  The       Impression:      No fracture, no acute osseous cervical spine abnormality.  Partial visualization of the endotracheal tube, with soft tissue  swelling in the hypopharynx, not unexpected. Moderate spondylosis at  C6-7, with mild spinal stenosis right-sided neuroforaminal narrowing..              This report was finalized on 03/05/2022 10:51 by Dr. Farhad Munoz MD.    CT Head Without Contrast [[id number]] Collected: 03/05/22 1044     Updated: 03/05/22 1049    Narrative:      EXAMINATION: CT HEAD WO CONTRAST- 3/5/2022 10:44 AM CST     HISTORY: Delirium; R41.82-Altered mental status, unspecified.  Unresponsive.     DOSE: 625 mGycm (Automatic exposure control technique was implemented in  an effort to keep the radiation dose as low as possible without  compromising image quality)     REPORT: Spiral CT of the head was performed without contrast,  reconstructed coronal and sagittal images are also reviewed.     COMPARISON: CT head without contrast 10/5/2021.     No intracranial hemorrhage, mass or mass effect is identified. There is  mildly decreased attenuation in the periventricular white matter tracts  as before, compatible with mild chronic small vessel white matter  ischemic disease. The ventricles and basal cisterns are within normal  limits. Review of bone windows is negative for fractures. There soft  tissue swelling in the posterior nasopharynx which may be related to  recent endotracheal tube insertion.       Impression:      No acute intracranial abnormality. Mild chronic small vessel  white matter ischemic changes appear stable.     This report was finalized on 03/05/2022 10:46 by Dr. Farhad Munoz MD.    XR Chest 1 View [[id number]] Collected:  03/05/22 1035     Updated: 03/05/22 1040    Narrative:      EXAMINATION: XR CHEST 1 VW- 3/5/2022 10:35 AM CST     HISTORY: intubation; R41.82-Altered mental status, unspecified.     REPORT: A frontal view of the chest was obtained.     COMPARISON: Chest x-ray 10/4/2021.     The patient is intubated, the tip of the endotracheal tube appears in  satisfactory position, approximately 2.8 cm superior to the jessie.  There are interstitial infiltrates in the left midlung and lower lung  zones, with associated volume loss. The right lung is clear. There is  mild cardiomegaly, this may be exaggerated by technique and positioning.  The patient is on a backboard. There is a stent in the left subclavian  and axillary region as before. No pneumothorax or pleural effusion is  identified. The upper abdomen appears unremarkable.       Impression:      Satisfactory position of the endotracheal tube. Interstitial  infiltrates are seen throughout most of the left lung without  consolidation. There is mild cardiomegaly, without evidence of overt  CHF.  This report was finalized on 03/05/2022 10:37 by Dr. Farhad Munoz MD.        My radiograph interpretation/independent review of imaging: No infiltrates, ET tube in good position    Echocardiogram  · Left ventricular systolic function is normal. Left ventricular ejection fraction appears to be 61 - 65%.  · Left ventricular wall thickness is consistent with mild concentric hypertrophy.  · Normal right ventricular cavity size and systolic function noted.  · Mild mitral valve stenosis is present.  · Trace tricuspid valve regurgitation is present. Estimated right ventricular systolic pressure from tricuspid regurgitation is normal (<35 mmHg).    EKG: Peaked T waves, left axis deviation, sinus rhythm    Pulmonary Assessment:    1. Acute respiratory failure due to altered mental status  2. Altered mental status, unresponsiveness due to metabolic disorder  3. Stage V chronic kidney disease,  on dialysis  4. DKA  5. Extreme hyperglycemia  6. Pseudohyponatremia    Recommend/plan:   · Patient was evaluated this a.m. after we were called late yesterday with consult request, at that time having been somewhat stabilized by acute interventions in the ER, but requiring intubation and initiation of mechanical ventilation.  · Spontaneous breathing trial, discussed with respiratory therapy.  · Patient completed successfully and was extubated.  · I went back to revisit her, in no distress on low-flow nasal oxygen.  · Note of antibiotic therapy started per primary service.  This is reasonable considering the Rachel around her presentation, although it does not appear to me that she has a lower respiratory tract infection.  Consider early discontinuation in absence of any positive cultures or other data to indicate need to continue after a couple of days  · No additional pulmonary plans at this time.  We will be available if needed but otherwise defer management of diabetes and renal failure to the others.    Thank you for this consult.  This visit was performed by both a physician and an [facility] RN.  I personally evaluated and examined the patient.  I performed all aspects of the medical decision making as documented.  Electronically signed by Zach Powers MD, 3/6/2022, 13:35 CST

## 2022-03-06 NOTE — PROGRESS NOTES
Nephrology ([facility]) Progress Note      Patient:  Ena Upton  YOB: 1970  Date of Service: 3/6/2022  MRN: [id number]   Acct: [id number]   Primary Care Physician: Yaron Wiggins APRN  Advance Directive:   There are no questions and answers to display.     Admit Date: 3/5/2022       Hospital Day: 1  Referring Provider: No Known Provider      Patient personally seen and examined.  Complete chart including Consults, Notes, Operative Reports, Labs, Cardiology, and Radiology studies reviewed as able.        Subjective:  Ena Upton is a 52 y.o. female for whom we were consulted for evaluation and treatment of end stage renal disease. Maintenance hemodialysis Tuesday Thursday Saturday at [facility]. Unknown last time she went to HD.  History of type 2 diabetes, hypertension, hypothyroidism.  Frequent hospitalizations due to noncompliance with dialysis and her other medical treatments including antihypertensives.  She was brought in by EMS found down at home.  Blood glucose was over 900 and the patient was on the ventilator and unable to participate in history physical examination.  Patient was seen on tolerating dialysis.  Events reviewed with nursing at the bedside as well as with Dr. Prado.  Blood pressure trends improved with Cardene infusion.    Today, no overnight events.  She was able to extubated this morning but lethargic from hypoglycemia and so unable to fully dissipate history and physical examination.  Events reviewed with nursing.  No issues with dialysis yesterday.    Allergies:  Penicillins, Lamisil [terbinafine], Reglan [metoclopramide], and Stadol [butorphanol]    Home Meds:  Medications Prior to Admission   Medication Sig Dispense Refill Last Dose   • acetaminophen (TYLENOL) 325 MG tablet Take 2 tablets by mouth Every 6 (Six) Hours As Needed for Mild Pain .      • albuterol (PROVENTIL HFA;VENTOLIN HFA) 108 (90 BASE) MCG/ACT inhaler  Inhale 2 puffs Every 4 (Four) Hours As Needed for wheezing.      • carvedilol (COREG) 25 MG tablet Take 1 tablet by mouth 2 (Two) Times a Day With Meals. 60 tablet 3    • cloNIDine (CATAPRES) 0.3 MG tablet Take 1 tablet by mouth Every 8 (Eight) Hours. 90 tablet 3    • DULoxetine (CYMBALTA) 60 MG capsule Take 60 mg by mouth daily.      • famotidine (PEPCID) 20 MG tablet Take 20 mg by mouth 2 (Two) Times a Day.      • hydrALAZINE (APRESOLINE) 50 MG tablet Take 1.5 tablets by mouth 3 (Three) Times a Day. 120 tablet 3    • insulin aspart (novoLOG) 100 UNIT/ML injection Inject 10 Units under the skin into the appropriate area as directed 3 (Three) Times a Day Before Meals.      • Insulin Glargine (BASAGLAR KWIKPEN) 100 UNIT/ML injection pen Inject 40 Units under the skin into the appropriate area as directed Every Night.      • isosorbide mononitrate (IMDUR) 60 MG 24 hr tablet Take 1 tablet by mouth Daily.      • levothyroxine (SYNTHROID, LEVOTHROID) 50 MCG tablet Take 1 tablet by mouth Daily. 30 tablet 3    • lisinopril (PRINIVIL,ZESTRIL) 20 MG tablet Take 20 mg by mouth 2 (Two) Times a Day.      • nicotine (NICODERM CQ) 21 MG/24HR patch Place 1 patch on the skin as directed by provider Daily.      • NIFEdipine XL (PROCARDIA XL) 60 MG 24 hr tablet Take 60 mg by mouth 2 (two) times a day.      • rOPINIRole (REQUIP) 4 MG tablet Take 4 mg by mouth Every Night.      • simvastatin (ZOCOR) 20 MG tablet Take 40 mg by mouth Daily.      • spironolactone (ALDACTONE) 25 MG tablet Take 1 tablet by mouth Daily.          Medicines:  Current Facility-Administered Medications   Medication Dose Route Frequency Provider Last Rate Last Admin   • atorvastatin (LIPITOR) tablet 20 mg  20 mg Oral Nightly Heath Prado MD       • carvedilol (COREG) tablet 12.5 mg  12.5 mg Oral BID With Meals Heath Prado MD   12.5 mg at 03/06/22 0811   • cefTRIAXone (ROCEPHIN) 1 g/100 mL 0.9% NS (MBP)  1 g Intravenous Q24H Heath Prado  MD GLORIA       • chlorhexidine (PERIDEX) 0.12 % solution 15 mL  15 mL Mouth/Throat Q12H Heath Prado MD   15 mL at 03/05/22 2013   • dextrose (D50W) (25 g/50 mL) IV injection 12.5 g  12.5 g Intravenous PRN Heath Prado MD   12.5 g at 03/06/22 0254   • dextrose (D50W) (25 g/50 mL) IV injection 25 g  25 g Intravenous Q15 Min PRN Ean Dumont MD   25 g at 03/06/22 0951   • dextrose (GLUTOSE) oral gel 15 g  15 g Oral Q15 Min PRN Ean Dumont MD       • dextrose 5 % and sodium chloride 0.45 % infusion  50 mL/hr Intravenous Continuous PRN Heath Prado MD       • dextrose 5 % and sodium chloride 0.45 % with KCl 20 mEq/L infusion  50 mL/hr Intravenous Continuous PRN Heath Prado MD       • dextrose 5 % and sodium chloride 0.9 % infusion  50 mL/hr Intravenous Continuous PRN Heath Prado MD       • enoxaparin (LOVENOX) syringe 30 mg  30 mg Subcutaneous Q24H Heath Prado MD   30 mg at 03/05/22 1303   • famotidine (PEPCID) injection 20 mg  20 mg Intravenous Daily Heath Prado MD   20 mg at 03/05/22 1304   • glucagon (human recombinant) (GLUCAGEN DIAGNOSTIC) injection 1 mg  1 mg Intramuscular Q15 Min PRN Ean Dumont MD       • hydrALAZINE (APRESOLINE) tablet 50 mg  50 mg Oral Q8H Heath Prado MD   50 mg at 03/06/22 0811   • insulin detemir (LEVEMIR) injection 20 Units  20 Units Subcutaneous Nightly Ean Dumont MD   20 Units at 03/06/22 0447   • insulin lispro (humaLOG) injection 2-7 Units  2-7 Units Subcutaneous Q4H Ean Dumont MD       • ipratropium-albuterol (DUO-NEB) nebulizer solution 3 mL  3 mL Nebulization 4x Daily - RT Heath Prado MD   3 mL at 03/06/22 1027   • labetalol (NORMODYNE,TRANDATE) injection 10 mg  10 mg Intravenous Q4H PRN Heath Prado MD   10 mg at 03/06/22 0816   • levothyroxine (SYNTHROID, LEVOTHROID) tablet 50 mcg  50 mcg Oral Q AM Heath Prado MD   50 mcg at 03/06/22 0811   •  lisinopril (PRINIVIL,ZESTRIL) tablet 20 mg  20 mg Oral Q12H Heath Prado MD   20 mg at 03/06/22 0811   • LORazepam (ATIVAN) injection 1 mg  1 mg Intravenous Q4H PRN Heath Prado MD   1 mg at 03/05/22 1303   • Morphine sulfate (PF) injection 2 mg  2 mg Intravenous Q4H PRN Heath Prado MD   2 mg at 03/05/22 1303   • niCARdipine (CARDENE) 25 mg in 250 mL NS infusion  5-15 mg/hr Intravenous Titrated Heath Prado MD   Held at 03/05/22 1434   • ondansetron (ZOFRAN) injection 4 mg  4 mg Intravenous Q6H PRN Heath Prado MD       • propofol (DIPRIVAN) infusion 10 mg/mL 100 mL  5-50 mcg/kg/min Intravenous Titrated Heath Prado MD 19.37 mL/hr at 03/06/22 0608 40 mcg/kg/min at 03/06/22 0608   • sodium chloride 0.45 % infusion  50 mL/hr Intravenous Continuous PRN Heath Prado MD       • sodium chloride 0.45 % with KCl 20 mEq/L infusion  50 mL/hr Intravenous Continuous PRN Heath Prado MD       • sodium chloride 0.9 % flush 10 mL  10 mL Intravenous Q12H Heath Prado MD       • sodium chloride 0.9 % flush 10 mL  10 mL Intravenous PRN Heath Prado MD       • sodium chloride 0.9 % flush 10 mL  10 mL Intravenous Once PRN Heath Prado MD       • sodium chloride 0.9 % flush 10 mL  10 mL Intravenous Q12H Heath Prado MD   10 mL at 03/06/22 0811   • sodium chloride 0.9 % flush 10 mL  10 mL Intravenous PRN Heath Prado MD       • sodium chloride 0.9 % infusion  50 mL/hr Intravenous Continuous PRN Heath Prado MD       • sodium chloride 0.9 % infusion  10 mL/hr Intravenous Continuous PRN Heath Prado MD       • sodium chloride 0.9 % with KCl 20 mEq/L infusion  50 mL/hr Intravenous Continuous PRN Heath Prado MD 50 mL/hr at 03/05/22 2000 50 mL/hr at 03/05/22 2000   • [START ON 3/8/2022] vancomycin 750 mg/250 mL 0.9% NS IVPB ([facility])  10 mg/kg Intravenous Q48H Heath Prado MD           Past  Medical History:  Past Medical History:   Diagnosis Date   • Anxiety    • Arthritis    • Chronic kidney disease     STAGE V RENAL FAILURE   • Depression    • Diabetes mellitus (HCC)    • HTN (hypertension)    • Hypothyroidism    • Obesity    • Tremors of nervous system        Past Surgical History:  Past Surgical History:   Procedure Laterality Date   • ARTERIOVENOUS FISTULA     • BREAST BIOPSY     • CARPAL TUNNEL RELEASE     • CATH LAB PROCEDURE     • CHOLECYSTECTOMY     • TUBAL ABDOMINAL LIGATION         Family History  Family History   Problem Relation Age of Onset   • Cancer Other    • Hypertension Other    • Kidney disease Other    • Heart disease Other    • Diabetes Other    • Diabetes Mother    • Diabetes Maternal Aunt    • Diabetes Maternal Grandmother        Social History  Social History     Socioeconomic History   • Marital status:    Tobacco Use   • Smoking status: Current Every Day Smoker     Packs/day: 0.50   • Smokeless tobacco: Never Used   • Tobacco comment: CUTTING BACK AND TRYING TO STOP SMOKING   Substance and Sexual Activity   • Alcohol use: No   • Drug use: No   • Sexual activity: Defer       Review of Systems:  History obtained from unobtainable from patient due to mental status      Objective:  Patient Vitals for the past 24 hrs:   BP Temp Temp src Pulse Resp SpO2 Height Weight   03/06/22 1027 -- -- -- 71 12 100 % -- --   03/06/22 0649 -- -- -- 87 18 -- -- --   03/06/22 0639 -- -- -- 85 18 100 % -- --   03/06/22 0500 129/67 -- -- 82 -- 100 % -- --   03/06/22 0400 130/72 99.5 °F (37.5 °C) -- 81 -- 100 % -- 76.1 kg (167 lb 12.3 oz)   03/06/22 0300 135/67 -- -- 81 -- 100 % -- --   03/06/22 0200 113/62 -- -- 77 -- 100 % -- --   03/06/22 0100 121/63 -- -- 77 -- 100 % -- --   03/06/22 0000 111/63 97.8 °F (36.6 °C) Axillary 75 -- 100 % -- --   03/05/22 2300 114/66 -- -- 74 -- 100 % -- --   03/05/22 2200 117/66 -- -- 74 -- 100 % -- --   03/05/22 2100 107/64 -- -- 73 -- 100 % -- --   03/05/22  "2000 127/71 97.6 °F (36.4 °C) -- 77 18 100 % -- --   03/05/22 1911 -- -- -- 76 18 100 % -- --   03/05/22 1900 125/70 -- -- 74 -- 100 % -- --   03/05/22 1845 121/70 -- -- 76 -- 100 % -- --   03/05/22 1830 119/67 -- -- 75 -- 100 % -- --   03/05/22 1815 120/67 -- -- 76 -- 100 % -- --   03/05/22 1800 133/69 -- -- 77 -- 100 % -- --   03/05/22 1745 142/73 -- -- 80 -- 100 % -- --   03/05/22 1730 136/72 -- -- 79 -- 100 % -- --   03/05/22 1715 131/71 -- -- 80 -- 100 % -- --   03/05/22 1700 111/69 -- -- 81 -- 100 % -- --   03/05/22 1645 106/65 -- -- 79 -- 100 % -- --   03/05/22 1630 104/64 -- -- 77 -- 99 % -- --   03/05/22 1615 109/65 -- -- 80 -- 99 % -- --   03/05/22 1609 -- -- -- 81 19 -- -- --   03/05/22 1603 -- -- -- 79 18 97 % -- --   03/05/22 1600 102/63 -- -- 78 -- 98 % -- --   03/05/22 1545 110/65 -- -- 79 -- 98 % -- --   03/05/22 1530 110/66 96.9 °F (36.1 °C) Axillary 78 -- 98 % -- --   03/05/22 1515 108/65 -- -- 79 -- 98 % -- --   03/05/22 1500 105/66 -- -- 80 -- 98 % -- --   03/05/22 1445 113/66 -- -- 79 -- 100 % -- --   03/05/22 1430 105/66 -- -- 76 -- 100 % -- --   03/05/22 1415 110/68 -- -- 76 -- 100 % -- --   03/05/22 1400 119/68 -- -- 75 -- 100 % -- --   03/05/22 1345 128/63 -- -- 77 -- 100 % -- --   03/05/22 1330 127/64 -- -- 77 -- 98 % -- --   03/05/22 1315 125/74 -- -- 77 -- 100 % -- --   03/05/22 1300 134/67 -- -- 82 -- 100 % -- --   03/05/22 1245 138/69 -- -- 86 -- 100 % -- --   03/05/22 1230 140/70 -- -- 87 -- 100 % -- --   03/05/22 1215 (!) 133/105 -- -- 88 -- 100 % -- --   03/05/22 1200 -- 97.8 °F (36.6 °C) Axillary -- -- -- 157.5 cm (62\") 75.5 kg (166 lb 7.2 oz)   03/05/22 1149 174/96 97.1 °F (36.2 °C) Rectal 97 18 100 % -- --   03/05/22 1134 (!) 195/83 -- -- 94 20 100 % -- --   03/05/22 1124 (!) 201/87 -- -- 96 20 99 % -- --   03/05/22 1109 (!) 192/88 -- -- 97 18 100 % -- --   03/05/22 1047 (!) 190/92 -- -- 92 19 99 % -- --   03/05/22 1038 (!) 232/92 -- -- 100 18 99 % -- --       Intake/Output " Summary (Last 24 hours) at 3/6/2022 1034  Last data filed at 3/6/2022 0400  Gross per 24 hour   Intake 1353.57 ml   Output 1 ml   Net 1352.57 ml     General: Lethargic and chronically ill-appearing  Chest:  clear to auscultation bilaterally without respiratory distress  CVS: regular rate and rhythm  Abdominal: soft, nontender, positive bowel sounds  Extremities: no cyanosis or edema  Skin: warm and dry without rash      Labs:  Results from last 7 days   Lab Units 03/05/22  1237 03/05/22  1010   WBC 10*3/mm3 8.12 7.17   HEMOGLOBIN g/dL 9.1* 10.7*   HEMATOCRIT % 28.7* 33.7*   PLATELETS 10*3/mm3 151 193         Results from last 7 days   Lab Units 03/06/22  0420 03/06/22  0024 03/05/22  2000 03/05/22  1237 03/05/22  1042   SODIUM mmol/L 134* 132* 132*   < > 119*   POTASSIUM mmol/L 3.8 3.2* 3.0*   < > 4.3   CHLORIDE mmol/L 97* 96* 94*   < > 82*   CO2 mmol/L 24.0 25.0 26.0   < > 21.0*   BUN mg/dL 29* 28* 26*   < > 49*   CREATININE mg/dL 3.24* 3.09* 2.91*   < > 4.66*   CALCIUM mg/dL 8.5* 8.2* 8.1*   < > 8.4*   EGFR mL/min/1.73 16.5* 17.4* 18.7*   < > 10.6*   BILIRUBIN mg/dL  --  0.3  --   --  0.4   ALK PHOS U/L  --  85  --   --  138*   ALT (SGPT) U/L  --  15  --   --  23   AST (SGOT) U/L  --  17  --   --  38*   GLUCOSE mg/dL 66 188* 378*   < > 972*    < > = values in this interval not displayed.       Radiology:   Imaging Results (Last 72 Hours)     Procedure Component Value Units Date/Time    XR Chest 1 View [[id number]] Collected: 03/06/22 0822     Updated: 03/06/22 0826    Narrative:      EXAMINATION: XR CHEST 1 VW- 3/6/2022 8:22 AM CST     HISTORY: Intubated Patient; R41.82-Altered mental status, unspecified.     REPORT: A frontal view of the chest was obtained.     COMPARISON: Chest x-ray 3/5/2022 1002 hours.     The endotracheal tube and NG tube appear in satisfactory position , the  right lung is clear, there are interstitial infiltrates in the left  medial lung extending into the left lung base, with there is  also  probable atelectasis. No pneumothorax or pleural effusion is identified.  Vascular stents are noted in the left subclavian region, left upper arm.       Impression:      Interval insertion of an NG tube, in satisfactory position.  The endotracheal tube remains in good position. No change in patchy  interstitial infiltrates in the left lung and streaky atelectasis in the  left lung base.  This report was finalized on 03/06/2022 08:23 by Dr. Farhad Munoz MD.    XR Abdomen KUB [[id number]] Collected: 03/05/22 1723     Updated: 03/05/22 1726    Narrative:      HISTORY: NG placement     KUB: Frontal view of the lower chest and upper abdomen obtained.     Enteric tube tip is present within the body of the stomach. The  side-port at the GE junction. Left retrocardiac opacities.  Cholecystectomy clips.       Impression:      1. Enteric tube tip in the body the stomach with side-port at the GE  junction.  This report was finalized on 03/05/2022 17:23 by Dr. Desire Castro MD.    CT Cervical Spine Without Contrast [[id number]] Collected: 03/05/22 1047     Updated: 03/05/22 1054    Narrative:      EXAMINATION: CT CERVICAL SPINE WO CONTRAST- 3/5/2022 10:47 AM CST     HISTORY: Neck pain, acute, no red flags; R41.82-Altered mental status,  unspecified. Patient found on floor unresponsive. Unwitnessed fall.     DOSE: 362 mGycm (Automatic exposure control technique was implemented in  an effort to keep the radiation dose as low as possible without  compromising image quality)     REPORT: Spiral CT of the cervical spine was performed without contrast,  reconstructed coronal and sagittal images are also reviewed.     COMPARISON: There are no correlative imaging studies for comparison.     Sagittal images demonstrate straightening of the cervical curvature  without spondylolisthesis. There is moderate degenerative disc disease  at C6-7 with endplate spurring and mild disc space narrowing. No  fracture is identified. The  patient is intubated and there is soft  tissue swelling in the hypopharynx. There is normal aeration of the  mastoid air cells. There is mild right-sided neuroforaminal narrowing at  C6-7 related to uncinate spurs. There is mild spinal stenosis at C6-7.  Soft tissue windows show no gross evidence of a traumatic cervical disc  herniation. The visualized upper lungs are clear. There is moderately  heavy calcified plaque within the carotid arteries at the bifurcation.  The       Impression:      No fracture, no acute osseous cervical spine abnormality.  Partial visualization of the endotracheal tube, with soft tissue  swelling in the hypopharynx, not unexpected. Moderate spondylosis at  C6-7, with mild spinal stenosis right-sided neuroforaminal narrowing..              This report was finalized on 03/05/2022 10:51 by Dr. Farhad Munoz MD.    CT Head Without Contrast [[id number]] Collected: 03/05/22 1044     Updated: 03/05/22 1049    Narrative:      EXAMINATION: CT HEAD WO CONTRAST- 3/5/2022 10:44 AM CST     HISTORY: Delirium; R41.82-Altered mental status, unspecified.  Unresponsive.     DOSE: 625 mGycm (Automatic exposure control technique was implemented in  an effort to keep the radiation dose as low as possible without  compromising image quality)     REPORT: Spiral CT of the head was performed without contrast,  reconstructed coronal and sagittal images are also reviewed.     COMPARISON: CT head without contrast 10/5/2021.     No intracranial hemorrhage, mass or mass effect is identified. There is  mildly decreased attenuation in the periventricular white matter tracts  as before, compatible with mild chronic small vessel white matter  ischemic disease. The ventricles and basal cisterns are within normal  limits. Review of bone windows is negative for fractures. There soft  tissue swelling in the posterior nasopharynx which may be related to  recent endotracheal tube insertion.       Impression:      No acute  intracranial abnormality. Mild chronic small vessel  white matter ischemic changes appear stable.     This report was finalized on 03/05/2022 10:46 by Dr. Farhad Munoz MD.    XR Chest 1 View [[id number]] Collected: 03/05/22 1035     Updated: 03/05/22 1040    Narrative:      EXAMINATION: XR CHEST 1 VW- 3/5/2022 10:35 AM CST     HISTORY: intubation; R41.82-Altered mental status, unspecified.     REPORT: A frontal view of the chest was obtained.     COMPARISON: Chest x-ray 10/4/2021.     The patient is intubated, the tip of the endotracheal tube appears in  satisfactory position, approximately 2.8 cm superior to the jessie.  There are interstitial infiltrates in the left midlung and lower lung  zones, with associated volume loss. The right lung is clear. There is  mild cardiomegaly, this may be exaggerated by technique and positioning.  The patient is on a backboard. There is a stent in the left subclavian  and axillary region as before. No pneumothorax or pleural effusion is  identified. The upper abdomen appears unremarkable.       Impression:      Satisfactory position of the endotracheal tube. Interstitial  infiltrates are seen throughout most of the left lung without  consolidation. There is mild cardiomegaly, without evidence of overt  CHF.  This report was finalized on 03/05/2022 10:37 by Dr. Farhad Munoz MD.          Culture:  Urine Culture   Date Value Ref Range Status   03/05/2022 50,000 CFU/mL Staphylococcus, coagulase negative (A)  Final     Comment:     By laboratory criteria, addititional testing is not indicated and unlikely to produce clinically relevant information.  Coagulase negative staphylococci are common skin contaminants, members of the oral lilly,  and of low virulence; requires clinical correlation.           Assessment   End-stage renal disease  Diabetes type 2 with diabetic nephropathy  Hypertension  Hyperosmolar hyperglycemic on admission  Hyponatremia due to volume overload/profound  hyperglycemia on admit  Metabolic acidosis  Acute metabolic encephalopathy  Anemia in chronic kidney disease  Secondary hyperparathyroidism  Hypothyroidism     Plan:  Hemodialysis Tuesday Thursday Saturday per routine scheduling  Monitor labs  Ultrafiltration as tolerated with dialysis 3/5  Continue to adjust bp meds as needed   Sodium level improved with correction of hyperglycemia and treatments adjusted for current hypoglycemia  No emergent occasion for dialysis today, reassess in the morning  Discussed with nursing and patient as able  Potassium levels currently adequate but is on a continuous infusion with potassium in it and needs to be monitored with caution in dialysis patient      Surjit Avitia MD  3/6/2022  10:34 CST

## 2022-03-06 NOTE — PROGRESS NOTES
[facility]  INPATIENT PROGRESS NOTE    Patient Name: Ena Upton  Date of Admission: 3/5/2022  Today's Date: 03/06/22  Length of Stay: 1  Primary Care Physician: Yaron Wiggins APRN    Subjective   Chief Complaint: patient found unresponsive  HPI   No acute events from overnight.  Blood sugars are markedly improved.  Patient was able to come off of insulin drip, and was administered a dose of Levemir around 0500.  She has had a blood sugar that trended down to 29 this morning and patient was promptly administered dextrose.  Repeat blood sugar 120.  She is currently on intravenous fluids containing dextrose at a slow rate.  Cardene IV drip is off.  Patient did receive dialysis/ultrafiltration yesterday.  She is currently on propofol for sedation.  She remains on minimal vent settings at 30% FiO2 and 5 of PEEP.    Review of Systems     All pertinent negatives and positives are as above. All other systems have been reviewed and are negative unless otherwise stated.     Objective    Temp:  [96.9 °F (36.1 °C)-99.5 °F (37.5 °C)] 99.5 °F (37.5 °C)  Heart Rate:  [] 87  Resp:  [16-20] 18  BP: (102-257)/() 129/67  FiO2 (%):  [30 %-100 %] 30 %  Physical Exam  Vitals reviewed.   Constitutional:       Appearance: She is ill-appearing.   HENT:      Head: Normocephalic.      Mouth/Throat:      Pharynx: No oropharyngeal exudate (ET tube and NG tube in lace).   Cardiovascular:      Rate and Rhythm: Normal rate.      Heart sounds: No murmur heard.  Pulmonary:      Effort: Pulmonary effort is normal.      Breath sounds: No rales.      Comments: No prominent rhonchi or rales; on 30% FiO2 and 5 of PEEP  Abdominal:      Palpations: Abdomen is soft.   Musculoskeletal:      Cervical back: Neck supple.      Right lower leg: Edema present.      Left lower leg: Edema present.      Comments: AV fistula left upper extremity   Skin:     General: Skin is warm.       Capillary Refill: Capillary refill takes less than 2 seconds.   Neurological:      Comments: Intubated and sedated   Psychiatric:      Comments: Intubated and sedated         Results Review:  I have reviewed the labs, radiology results, and diagnostic studies.    Laboratory Data:   Results from last 7 days   Lab Units 03/05/22  1237 03/05/22  1010   WBC 10*3/mm3 8.12 7.17   HEMOGLOBIN g/dL 9.1* 10.7*   HEMATOCRIT % 28.7* 33.7*   PLATELETS 10*3/mm3 151 193        Results from last 7 days   Lab Units 03/06/22  0420 03/06/22  0024 03/05/22  2000 03/05/22  1237 03/05/22  1042   SODIUM mmol/L 134* 132* 132*   < > 119*   POTASSIUM mmol/L 3.8 3.2* 3.0*   < > 4.3   CHLORIDE mmol/L 97* 96* 94*   < > 82*   CO2 mmol/L 24.0 25.0 26.0   < > 21.0*   BUN mg/dL 29* 28* 26*   < > 49*   CREATININE mg/dL 3.24* 3.09* 2.91*   < > 4.66*   CALCIUM mg/dL 8.5* 8.2* 8.1*   < > 8.4*   BILIRUBIN mg/dL  --  0.3  --   --  0.4   ALK PHOS U/L  --  85  --   --  138*   ALT (SGPT) U/L  --  15  --   --  23   AST (SGOT) U/L  --  17  --   --  38*   GLUCOSE mg/dL 66 188* 378*   < > 972*    < > = values in this interval not displayed.       Culture Data:   No results found for: BLOODCX, URINECX, WOUNDCX, MRSACX, RESPCX, STOOLCX    Radiology Data:   Imaging Results (Last 24 Hours)     Procedure Component Value Units Date/Time    XR Chest 1 View [[id number]] Resulted: 03/06/22 0428     Updated: 03/06/22 0440    XR Abdomen KUB [[id number]] Collected: 03/05/22 1723     Updated: 03/05/22 1726    Narrative:      HISTORY: NG placement     KUB: Frontal view of the lower chest and upper abdomen obtained.     Enteric tube tip is present within the body of the stomach. The  side-port at the GE junction. Left retrocardiac opacities.  Cholecystectomy clips.       Impression:      1. Enteric tube tip in the body the stomach with side-port at the GE  junction.  This report was finalized on 03/05/2022 17:23 by Dr. Desire Castro MD.    CT Cervical Spine Without Contrast  [[id number]] Collected: 03/05/22 1047     Updated: 03/05/22 1054    Narrative:      EXAMINATION: CT CERVICAL SPINE WO CONTRAST- 3/5/2022 10:47 AM CST     HISTORY: Neck pain, acute, no red flags; R41.82-Altered mental status,  unspecified. Patient found on floor unresponsive. Unwitnessed fall.     DOSE: 362 mGycm (Automatic exposure control technique was implemented in  an effort to keep the radiation dose as low as possible without  compromising image quality)     REPORT: Spiral CT of the cervical spine was performed without contrast,  reconstructed coronal and sagittal images are also reviewed.     COMPARISON: There are no correlative imaging studies for comparison.     Sagittal images demonstrate straightening of the cervical curvature  without spondylolisthesis. There is moderate degenerative disc disease  at C6-7 with endplate spurring and mild disc space narrowing. No  fracture is identified. The patient is intubated and there is soft  tissue swelling in the hypopharynx. There is normal aeration of the  mastoid air cells. There is mild right-sided neuroforaminal narrowing at  C6-7 related to uncinate spurs. There is mild spinal stenosis at C6-7.  Soft tissue windows show no gross evidence of a traumatic cervical disc  herniation. The visualized upper lungs are clear. There is moderately  heavy calcified plaque within the carotid arteries at the bifurcation.  The       Impression:      No fracture, no acute osseous cervical spine abnormality.  Partial visualization of the endotracheal tube, with soft tissue  swelling in the hypopharynx, not unexpected. Moderate spondylosis at  C6-7, with mild spinal stenosis right-sided neuroforaminal narrowing..              This report was finalized on 03/05/2022 10:51 by Dr. Farhad Munoz MD.    CT Head Without Contrast [[id number]] Collected: 03/05/22 1044     Updated: 03/05/22 1049    Narrative:      EXAMINATION: CT HEAD WO CONTRAST- 3/5/2022 10:44 AM CST     HISTORY:  Delirium; R41.82-Altered mental status, unspecified.  Unresponsive.     DOSE: 625 mGycm (Automatic exposure control technique was implemented in  an effort to keep the radiation dose as low as possible without  compromising image quality)     REPORT: Spiral CT of the head was performed without contrast,  reconstructed coronal and sagittal images are also reviewed.     COMPARISON: CT head without contrast 10/5/2021.     No intracranial hemorrhage, mass or mass effect is identified. There is  mildly decreased attenuation in the periventricular white matter tracts  as before, compatible with mild chronic small vessel white matter  ischemic disease. The ventricles and basal cisterns are within normal  limits. Review of bone windows is negative for fractures. There soft  tissue swelling in the posterior nasopharynx which may be related to  recent endotracheal tube insertion.       Impression:      No acute intracranial abnormality. Mild chronic small vessel  white matter ischemic changes appear stable.     This report was finalized on 03/05/2022 10:46 by Dr. Farhad Munoz MD.    XR Chest 1 View [[id number]] Collected: 03/05/22 1035     Updated: 03/05/22 1040    Narrative:      EXAMINATION: XR CHEST 1 VW- 3/5/2022 10:35 AM CST     HISTORY: intubation; R41.82-Altered mental status, unspecified.     REPORT: A frontal view of the chest was obtained.     COMPARISON: Chest x-ray 10/4/2021.     The patient is intubated, the tip of the endotracheal tube appears in  satisfactory position, approximately 2.8 cm superior to the jessie.  There are interstitial infiltrates in the left midlung and lower lung  zones, with associated volume loss. The right lung is clear. There is  mild cardiomegaly, this may be exaggerated by technique and positioning.  The patient is on a backboard. There is a stent in the left subclavian  and axillary region as before. No pneumothorax or pleural effusion is  identified. The upper abdomen appears  unremarkable.       Impression:      Satisfactory position of the endotracheal tube. Interstitial  infiltrates are seen throughout most of the left lung without  consolidation. There is mild cardiomegaly, without evidence of overt  CHF.  This report was finalized on 03/05/2022 10:37 by Dr. Farhad Munoz MD.          I have reviewed the patient's current medications.     Assessment/Plan     Active Hospital Problems    Diagnosis    • Acute encephalopathy    • Elevated troponin    • Hyponatremia    • HHNC (hyperglycemic hyperosmolar nonketotic coma) ([facility])    • Hypertensive emergency    • Hypothyroidism    • Altered mental status    • ESRD (end stage renal disease) ([facility])        Plan:  1.  Insulin gtt now off, in fact had to be administered dextrose this morning in the setting of hypoglycemia  2.  Cardene IV gtt now off  3.  On minimal vent settings at 30% FiO2 and 5 of PEEP  4.  Would like to see if we can wean sedation, assess mental status, and also assess readiness for spontaneous breathing trial and possible extubation.  5.  Pulmonary consulted and appreciate their assistance  6.  Patient did receive dialysis/ultrafiltration yesterday after arrival to the ICU; nephrology following.  Patient normally on a Tuesday, Thursday, Saturday schedule.  7.  I am going to restart some of her outpatient antihypertensive medications including Coreg, lisinopril, and hydralazine (via NG tube).  Review of her medication reconciliation list also includes the antihypertensives: Spironolactone, Procardia XL, clonidine, and Imdur  8.  Lovenox and Pepcid prophylaxis for now  9.  Scheduled nebulizer treatments  10.  Patient has been started on insulin Levemir and received her first dose at 0500 this AM; sliding scale insulin coverage; monitor blood sugar trend closely especially with trend towards hypoglycemia this AM that required administration of dextrose.  11.  Her A1c trend as follows:    12.  Nursing just notified that patient did  have a blood culture returned for gram-positive cocci - results currently not available in Epic.  We will start empiric treatment with vancomycin, and monitor results of blood cultures closely.  So far patient has been afebrile.  Her white blood cell count has been normal.  Her procalcitonin was slightly elevated at 0.28 with a CRP of 8.03.  13.  Troponin with flat trend and do no suspect ACS but rather demand event given her clinical presentation.  14.  Echo results as follows:  Results for orders placed during the hospital encounter of 03/05/22    Adult Transthoracic Echo Complete W/ Cont if Necessary Per Protocol    Interpretation Summary  · Left ventricular systolic function is normal. Left ventricular ejection fraction appears to be 61 - 65%.  · Left ventricular wall thickness is consistent with mild concentric hypertrophy.  · Normal right ventricular cavity size and systolic function noted.  · Mild mitral valve stenosis is present.  · Trace tricuspid valve regurgitation is present. Estimated right ventricular systolic pressure from tricuspid regurgitation is normal (<35 mmHg).    15.  Seizure precautions  16.  ICU care. > 30 min critical care time spent.      Electronically signed by Heath Prado MD, 03/06/22, 06:52 CST.    Addendum: Notified by nursing staff a few minutes ago that repeat labs obtained this afternoon revealed a potassium level of 6.6.    We will order calcium gluconate, insulin, dextrose, in addition to a dose of Lokelma to be administered via NG tube now.    Patient was receiving intravenous fluids containing potassium supplement (part of the DKA/HHS protocol) and those fluids will be discontinued now.  In fact, I will discontinue all orders for fluids containing potassium supplementation.  Patient has been having hypokalemia recently with potassium levels of 3.0, 3.2, and most recently 3.8 earlier this morning.    Continuous telemetry monitoring in the setting of  hyperkalemia.    Hold ACE inhibitor.    We will repeat potassium level here in the next few hours and reassess.  Nephrology following.    Electronically signed by Heath Prado MD, 03/06/22, 15:09 CST.

## 2022-03-07 PROBLEM — J96.00 ACUTE RESPIRATORY FAILURE (HCC): Status: ACTIVE | Noted: 2022-01-01

## 2022-03-07 PROBLEM — Z99.2 ESRD ON HEMODIALYSIS: Status: ACTIVE | Noted: 2018-10-19

## 2022-03-07 PROBLEM — E11.65 UNCONTROLLED TYPE 2 DIABETES MELLITUS WITH HYPERGLYCEMIA (HCC): Status: ACTIVE | Noted: 2021-01-01

## 2022-03-07 PROBLEM — G93.41 METABOLIC ENCEPHALOPATHY: Status: ACTIVE | Noted: 2021-01-01

## 2022-03-07 PROBLEM — D63.8 ANEMIA, CHRONIC DISEASE: Status: ACTIVE | Noted: 2022-01-01

## 2022-03-07 PROBLEM — Z72.0 TOBACCO ABUSE: Status: ACTIVE | Noted: 2022-01-01

## 2022-03-07 NOTE — PROGRESS NOTES
Nephrology ([facility]) Progress Note      Patient:  Ena Upton  YOB: 1970  Date of Service: 3/7/2022  MRN: [id number]   Acct: [id number]   Primary Care Physician: Yaron Wiggins APRN  Advance Directive:   Code Status and Medical Interventions:   Ordered at: 03/07/22 0841     Level Of Support Discussed With:    Patient     Code Status (Patient has no pulse and is not breathing):    CPR (Attempt to Resuscitate)     Medical Interventions (Patient has pulse or is breathing):    Full Support     Admit Date: 3/5/2022       Hospital Day: 2  Referring Provider: No Known Provider      Patient personally seen and examined.  Complete chart including Consults, Notes, Operative Reports, Labs, Cardiology, and Radiology studies reviewed as able.        Subjective:  Ena Upton is a 52 y.o. female for whom we were consulted for evaluation and treatment of end stage renal disease. Maintenance hemodialysis on Tuesday Thursday Saturday at [facility].  Frequently skips her dialysis treatments. History of type 2 diabetes, hypertension, hypothyroid. Found down at home on 3/05 and brought to ER via EMS. Initial blood glucose over 900 and patient required intubation. Tolerated HD well on 3/05. Was on Cardene drip due to hypertension. Extubated on 3/06.  Following IV and PO potassium supplementation, she developed hyperkalemia of 6.6. This was managed medically    Today is drowsy, will respond to some questions but immediately drifts off again. No new overnight issues.    Allergies:  Penicillins, Lamisil [terbinafine], Reglan [metoclopramide], and Stadol [butorphanol]    Home Meds:  Medications Prior to Admission   Medication Sig Dispense Refill Last Dose   • acetaminophen (TYLENOL) 325 MG tablet Take 2 tablets by mouth Every 6 (Six) Hours As Needed for Mild Pain .      • albuterol (PROVENTIL HFA;VENTOLIN HFA) 108 (90 BASE) MCG/ACT inhaler Inhale 2 puffs Every 4 (Four)  Hours As Needed for wheezing.      • carvedilol (COREG) 25 MG tablet Take 1 tablet by mouth 2 (Two) Times a Day With Meals. 60 tablet 3    • cloNIDine (CATAPRES) 0.3 MG tablet Take 1 tablet by mouth Every 8 (Eight) Hours. 90 tablet 3    • DULoxetine (CYMBALTA) 60 MG capsule Take 60 mg by mouth daily.      • famotidine (PEPCID) 20 MG tablet Take 20 mg by mouth 2 (Two) Times a Day.      • hydrALAZINE (APRESOLINE) 50 MG tablet Take 1.5 tablets by mouth 3 (Three) Times a Day. 120 tablet 3    • insulin aspart (novoLOG) 100 UNIT/ML injection Inject 10 Units under the skin into the appropriate area as directed 3 (Three) Times a Day Before Meals.      • Insulin Glargine (BASAGLAR KWIKPEN) 100 UNIT/ML injection pen Inject 40 Units under the skin into the appropriate area as directed Every Night.      • isosorbide mononitrate (IMDUR) 60 MG 24 hr tablet Take 1 tablet by mouth Daily.      • levothyroxine (SYNTHROID, LEVOTHROID) 50 MCG tablet Take 1 tablet by mouth Daily. 30 tablet 3    • lisinopril (PRINIVIL,ZESTRIL) 20 MG tablet Take 20 mg by mouth 2 (Two) Times a Day.      • nicotine (NICODERM CQ) 21 MG/24HR patch Place 1 patch on the skin as directed by provider Daily.      • NIFEdipine XL (PROCARDIA XL) 60 MG 24 hr tablet Take 60 mg by mouth 2 (two) times a day.      • rOPINIRole (REQUIP) 4 MG tablet Take 4 mg by mouth Every Night.      • simvastatin (ZOCOR) 20 MG tablet Take 40 mg by mouth Daily.      • spironolactone (ALDACTONE) 25 MG tablet Take 1 tablet by mouth Daily.          Medicines:  Current Facility-Administered Medications   Medication Dose Route Frequency Provider Last Rate Last Admin   • acetaminophen (TYLENOL) tablet 650 mg  650 mg Oral Q4H PRN Thomas Oliver, DO        Or   • acetaminophen (TYLENOL) 160 MG/5ML solution 650 mg  650 mg Oral Q4H PRN Thomas Oliver, DO        Or   • acetaminophen (TYLENOL) suppository 650 mg  650 mg Rectal Q4H PRN Thomas Oliver, DO       • atorvastatin (LIPITOR) tablet 20 mg   20 mg Oral Nightly Heath Prado MD   20 mg at 03/06/22 2028   • carvedilol (COREG) tablet 25 mg  25 mg Oral BID With Meals Thomas Oliver DO       • cefTRIAXone (ROCEPHIN) 1 g/100 mL 0.9% NS (MBP)  1 g Intravenous Q24H Heath Prado MD   1 g at 03/06/22 1040   • dextrose (D50W) (25 g/50 mL) IV injection 12.5 g  12.5 g Intravenous PRN Heath Prado MD   12.5 g at 03/06/22 2212   • dextrose (D50W) (25 g/50 mL) IV injection 25 g  25 g Intravenous Q15 Min PRN Ean Dumont MD   25 g at 03/07/22 0404   • dextrose (GLUTOSE) oral gel 15 g  15 g Oral Q15 Min PRN Ean Dumont MD       • dextrose 10 % infusion  50 mL/hr Intravenous Continuous Ean Dumont MD 50 mL/hr at 03/07/22 0520 50 mL/hr at 03/07/22 0520   • DULoxetine (CYMBALTA) DR capsule 60 mg  60 mg Oral Daily Thomas Oliver DO       • enoxaparin (LOVENOX) syringe 30 mg  30 mg Subcutaneous Q24H Heath Prado MD   30 mg at 03/06/22 1609   • famotidine (PEPCID) tablet 20 mg  20 mg Oral Daily Thomas Oliver DO       • glucagon (human recombinant) (GLUCAGEN DIAGNOSTIC) injection 1 mg  1 mg Intramuscular Q15 Min PRN Ean Dumont MD       • hydrALAZINE (APRESOLINE) tablet 75 mg  75 mg Oral Q8H Thomas Oliver DO       • insulin detemir (LEVEMIR) injection 20 Units  20 Units Subcutaneous Nightly Ean Dumont MD   20 Units at 03/06/22 0447   • insulin lispro (humaLOG) injection 2-7 Units  2-7 Units Subcutaneous Q4H Ean Dumont MD       • ipratropium-albuterol (DUO-NEB) nebulizer solution 3 mL  3 mL Nebulization 4x Daily - RT Heath Prado MD   3 mL at 03/07/22 0616   • isosorbide mononitrate (IMDUR) 24 hr tablet 60 mg  60 mg Oral Q24H Thomas Oliver, DO       • labetalol (NORMODYNE,TRANDATE) injection 10 mg  10 mg Intravenous Q4H PRN Heath Prado MD   10 mg at 03/07/22 0411   • levothyroxine (SYNTHROID, LEVOTHROID) tablet 50 mcg  50 mcg Oral Q AM Heath Prado MD   50 mcg  at 03/07/22 0531   • LORazepam (ATIVAN) injection 1 mg  1 mg Intravenous Q4H PRN Heath Prado MD   1 mg at 03/05/22 1303   • Morphine sulfate (PF) injection 2 mg  2 mg Intravenous Q4H PRN Heath Prado MD   2 mg at 03/05/22 1303   • nicotine (NICODERM CQ) 14 MG/24HR patch 1 patch  1 patch Transdermal Q24H Thomas Oliver, DO       • NIFEdipine XL (PROCARDIA XL) 24 hr tablet 60 mg  60 mg Oral Q24H Thomas Oliver, DO       • ondansetron (ZOFRAN) injection 4 mg  4 mg Intravenous Q6H PRN Heath Prado MD       • sodium chloride 0.45 % infusion  50 mL/hr Intravenous Continuous PRN Heath Prado MD       • sodium chloride 0.9 % flush 10 mL  10 mL Intravenous Q12H Heath Prado MD   10 mL at 03/07/22 0814   • sodium chloride 0.9 % flush 10 mL  10 mL Intravenous PRN Heath Prado MD       • sodium chloride 0.9 % flush 10 mL  10 mL Intravenous Once PRN Heath Prado MD       • sodium chloride 0.9 % flush 10 mL  10 mL Intravenous Q12H Heath Prado MD   10 mL at 03/07/22 0814   • sodium chloride 0.9 % flush 10 mL  10 mL Intravenous PRN Heath Prado MD       • sodium chloride 0.9 % infusion  50 mL/hr Intravenous Continuous PRN Heath Prado MD       • sodium chloride 0.9 % infusion  10 mL/hr Intravenous Continuous PRN Heath Prado MD           Past Medical History:  Past Medical History:   Diagnosis Date   • Anxiety    • Arthritis    • Chronic kidney disease     STAGE V RENAL FAILURE   • Depression    • Diabetes mellitus (HCC)    • HTN (hypertension)    • Hypothyroidism    • Obesity    • Tremors of nervous system        Past Surgical History:  Past Surgical History:   Procedure Laterality Date   • ARTERIOVENOUS FISTULA     • BREAST BIOPSY     • CARPAL TUNNEL RELEASE     • CATH LAB PROCEDURE     • CHOLECYSTECTOMY     • TUBAL ABDOMINAL LIGATION         Family History  Family History   Problem Relation Age of Onset   • Cancer Other    •  Hypertension Other    • Kidney disease Other    • Heart disease Other    • Diabetes Other    • Diabetes Mother    • Diabetes Maternal Aunt    • Diabetes Maternal Grandmother        Social History  Social History     Socioeconomic History   • Marital status:    Tobacco Use   • Smoking status: Current Every Day Smoker     Packs/day: 0.50   • Smokeless tobacco: Never Used   • Tobacco comment: CUTTING BACK AND TRYING TO STOP SMOKING   Substance and Sexual Activity   • Alcohol use: No   • Drug use: No   • Sexual activity: Defer       Review of Systems:  History obtained from chart review and the patient  General ROS: No fever or chills  Respiratory ROS: No cough, shortness of breath, wheezing  Cardiovascular ROS: No chest pain or palpitations  Gastrointestinal ROS: No abdominal pain or melena  Genito-Urinary ROS: No dysuria or hematuria  Psych ROS: No anxiety and depression  14 point ROS reviewed with the patient and negative except as noted above and in the HPI unless unable to obtain.    Objective:  Patient Vitals for the past 24 hrs:   BP Temp Temp src Pulse Resp SpO2 Weight   03/07/22 0630 157/81 -- -- 83 -- 94 % --   03/07/22 0622 -- -- -- 84 12 100 % --   03/07/22 0616 -- -- -- 82 16 95 % --   03/07/22 0545 156/77 -- -- 83 -- 96 % --   03/07/22 0530 145/69 -- -- 82 -- 99 % --   03/07/22 0515 152/84 -- -- 80 -- 94 % --   03/07/22 0500 154/77 -- -- 82 9 99 % --   03/07/22 0445 150/83 -- -- 80 -- 98 % --   03/07/22 0430 165/74 -- -- 78 -- 98 % --   03/07/22 0415 143/67 -- -- 77 -- 96 % --   03/07/22 0413 -- 98.4 °F (36.9 °C) Axillary -- 11 -- --   03/07/22 0400 154/75 -- -- 84 -- 95 % --   03/07/22 0345 159/77 -- -- 84 -- 99 % --   03/07/22 0330 158/75 -- -- 84 -- 95 % --   03/07/22 0315 151/82 -- -- 85 -- 99 % --   03/07/22 0300 147/86 -- -- 84 14 96 % 76.3 kg (168 lb 3.4 oz)   03/07/22 0245 -- -- -- 84 -- -- --   03/07/22 0230 154/78 -- -- 87 -- 97 % --   03/07/22 0215 150/86 -- -- 87 -- 95 % --   03/07/22  0200 145/84 -- -- 84 8 95 % --   03/07/22 0145 138/63 -- -- 86 -- 94 % --   03/07/22 0130 131/61 -- -- 84 -- 95 % --   03/07/22 0115 138/61 -- -- 85 -- 96 % --   03/07/22 0100 125/61 -- -- 84 16 96 % --   03/07/22 0045 132/59 -- -- 86 -- 97 % --   03/07/22 0030 153/77 -- -- 87 -- 96 % --   03/07/22 0015 140/70 100.1 °F (37.8 °C) Axillary 85 11 91 % --   03/07/22 0000 133/63 -- -- 89 -- 95 % --   03/06/22 2345 142/64 -- -- 89 -- 94 % --   03/06/22 2330 140/60 -- -- 92 -- 95 % --   03/06/22 2315 144/69 -- -- 89 -- 94 % --   03/06/22 2300 166/69 -- -- 91 17 97 % --   03/06/22 2245 173/71 -- -- 89 -- 93 % --   03/06/22 2230 162/76 -- -- 88 -- 96 % --   03/06/22 2215 (!) 160/101 -- -- 87 -- 96 % --   03/06/22 2200 164/67 -- -- 88 -- 97 % --   03/06/22 2145 160/70 -- -- 86 -- 97 % --   03/06/22 2130 157/68 -- -- 84 -- 97 % --   03/06/22 2115 157/72 -- -- 85 -- 96 % --   03/06/22 2100 151/74 -- -- 85 -- 96 % --   03/06/22 2045 151/78 -- -- 85 -- 94 % --   03/06/22 2030 141/66 -- -- 83 -- 96 % --   03/06/22 2015 143/64 -- -- 83 -- 95 % --   03/06/22 2000 134/75 -- -- 83 -- 94 % --   03/06/22 1945 141/78 -- -- 83 -- 95 % --   03/06/22 1930 141/70 -- -- 84 -- 95 % --   03/06/22 1915 138/77 -- -- 85 -- 95 % --   03/06/22 1900 129/66 -- -- 82 19 94 % --   03/06/22 1845 -- -- -- 82 15 93 % --   03/06/22 1800 147/61 -- -- 86 -- 96 % --   03/06/22 1700 167/72 -- -- 86 -- 99 % --   03/06/22 1600 177/69 99 °F (37.2 °C) Axillary 84 -- 99 % --   03/06/22 1504 -- -- -- 77 14 98 % --   03/06/22 1500 156/65 -- -- 77 -- 100 % --   03/06/22 1456 -- -- -- 78 14 100 % --   03/06/22 1400 178/71 -- -- 80 -- 100 % --   03/06/22 1300 175/66 -- -- 76 -- 100 % --   03/06/22 1200 144/66 -- -- 73 -- 100 % --   03/06/22 1100 126/57 -- -- 70 -- 100 % --   03/06/22 1027 -- -- -- 71 12 100 % --   03/06/22 1000 112/50 -- -- 68 -- 100 % --       Intake/Output Summary (Last 24 hours) at 3/7/2022 0915  Last data filed at 3/7/2022 0414  Gross per 24 hour    Intake 2278 ml   Output --   Net 2278 ml     General: drowsy  Chest:  clear to auscultation bilaterally without respiratory distress  CVS: regular rate and rhythm  Abdominal: soft, nontender, positive bowel sounds  Extremities: no cyanosis or edema  Skin: warm and dry without rash      Labs:  Results from last 7 days   Lab Units 03/07/22  0426 03/05/22  1237 03/05/22  1010   WBC 10*3/mm3 7.43 8.12 7.17   HEMOGLOBIN g/dL 8.3* 9.1* 10.7*   HEMATOCRIT % 26.3* 28.7* 33.7*   PLATELETS 10*3/mm3 156 151 193         Results from last 7 days   Lab Units 03/07/22  0426 03/06/22  1840 03/06/22  1357 03/06/22  0420 03/06/22  0024 03/05/22  1237 03/05/22  1042   SODIUM mmol/L 128* 130* 128*   < > 132*   < > 119*   POTASSIUM mmol/L 5.1 4.6 6.6*   < > 3.2*   < > 4.3   CHLORIDE mmol/L 93* 94* 95*   < > 96*   < > 82*   CO2 mmol/L 22.0 23.0 20.0*   < > 25.0   < > 21.0*   BUN mg/dL 36* 33* 33*   < > 28*   < > 49*   CREATININE mg/dL 4.01* 3.63* 3.52*   < > 3.09*   < > 4.66*   CALCIUM mg/dL 8.1* 8.6 8.2*   < > 8.2*   < > 8.4*   EGFR mL/min/1.73 12.8* 14.4* 14.9*   < > 17.4*   < > 10.6*   BILIRUBIN mg/dL 0.4  --   --   --  0.3  --  0.4   ALK PHOS U/L 103  --   --   --  85  --  138*   ALT (SGPT) U/L 13  --   --   --  15  --  23   AST (SGOT) U/L 23  --   --   --  17  --  38*   GLUCOSE mg/dL 117* 95 144*   < > 188*   < > 972*    < > = values in this interval not displayed.       Radiology:   Imaging Results (Last 72 Hours)     Procedure Component Value Units Date/Time    XR Chest 1 View [[id number]] Collected: 03/06/22 0822     Updated: 03/06/22 0826    Narrative:      EXAMINATION: XR CHEST 1 VW- 3/6/2022 8:22 AM CST     HISTORY: Intubated Patient; R41.82-Altered mental status, unspecified.     REPORT: A frontal view of the chest was obtained.     COMPARISON: Chest x-ray 3/5/2022 1002 hours.     The endotracheal tube and NG tube appear in satisfactory position , the  right lung is clear, there are interstitial infiltrates in the  left  medial lung extending into the left lung base, with there is also  probable atelectasis. No pneumothorax or pleural effusion is identified.  Vascular stents are noted in the left subclavian region, left upper arm.       Impression:      Interval insertion of an NG tube, in satisfactory position.  The endotracheal tube remains in good position. No change in patchy  interstitial infiltrates in the left lung and streaky atelectasis in the  left lung base.  This report was finalized on 03/06/2022 08:23 by Dr. Farhad Munoz MD.    XR Abdomen KUB [[id number]] Collected: 03/05/22 1723     Updated: 03/05/22 1726    Narrative:      HISTORY: NG placement     KUB: Frontal view of the lower chest and upper abdomen obtained.     Enteric tube tip is present within the body of the stomach. The  side-port at the GE junction. Left retrocardiac opacities.  Cholecystectomy clips.       Impression:      1. Enteric tube tip in the body the stomach with side-port at the GE  junction.  This report was finalized on 03/05/2022 17:23 by Dr. Desire Castro MD.    CT Cervical Spine Without Contrast [[id number]] Collected: 03/05/22 1047     Updated: 03/05/22 1054    Narrative:      EXAMINATION: CT CERVICAL SPINE WO CONTRAST- 3/5/2022 10:47 AM CST     HISTORY: Neck pain, acute, no red flags; R41.82-Altered mental status,  unspecified. Patient found on floor unresponsive. Unwitnessed fall.     DOSE: 362 mGycm (Automatic exposure control technique was implemented in  an effort to keep the radiation dose as low as possible without  compromising image quality)     REPORT: Spiral CT of the cervical spine was performed without contrast,  reconstructed coronal and sagittal images are also reviewed.     COMPARISON: There are no correlative imaging studies for comparison.     Sagittal images demonstrate straightening of the cervical curvature  without spondylolisthesis. There is moderate degenerative disc disease  at C6-7 with endplate spurring  and mild disc space narrowing. No  fracture is identified. The patient is intubated and there is soft  tissue swelling in the hypopharynx. There is normal aeration of the  mastoid air cells. There is mild right-sided neuroforaminal narrowing at  C6-7 related to uncinate spurs. There is mild spinal stenosis at C6-7.  Soft tissue windows show no gross evidence of a traumatic cervical disc  herniation. The visualized upper lungs are clear. There is moderately  heavy calcified plaque within the carotid arteries at the bifurcation.  The       Impression:      No fracture, no acute osseous cervical spine abnormality.  Partial visualization of the endotracheal tube, with soft tissue  swelling in the hypopharynx, not unexpected. Moderate spondylosis at  C6-7, with mild spinal stenosis right-sided neuroforaminal narrowing..              This report was finalized on 03/05/2022 10:51 by Dr. Farhad Munoz MD.    CT Head Without Contrast [[id number]] Collected: 03/05/22 1044     Updated: 03/05/22 1049    Narrative:      EXAMINATION: CT HEAD WO CONTRAST- 3/5/2022 10:44 AM CST     HISTORY: Delirium; R41.82-Altered mental status, unspecified.  Unresponsive.     DOSE: 625 mGycm (Automatic exposure control technique was implemented in  an effort to keep the radiation dose as low as possible without  compromising image quality)     REPORT: Spiral CT of the head was performed without contrast,  reconstructed coronal and sagittal images are also reviewed.     COMPARISON: CT head without contrast 10/5/2021.     No intracranial hemorrhage, mass or mass effect is identified. There is  mildly decreased attenuation in the periventricular white matter tracts  as before, compatible with mild chronic small vessel white matter  ischemic disease. The ventricles and basal cisterns are within normal  limits. Review of bone windows is negative for fractures. There soft  tissue swelling in the posterior nasopharynx which may be related to  recent  endotracheal tube insertion.       Impression:      No acute intracranial abnormality. Mild chronic small vessel  white matter ischemic changes appear stable.     This report was finalized on 03/05/2022 10:46 by Dr. Farhad Munoz MD.    XR Chest 1 View [[id number]] Collected: 03/05/22 1035     Updated: 03/05/22 1040    Narrative:      EXAMINATION: XR CHEST 1 VW- 3/5/2022 10:35 AM CST     HISTORY: intubation; R41.82-Altered mental status, unspecified.     REPORT: A frontal view of the chest was obtained.     COMPARISON: Chest x-ray 10/4/2021.     The patient is intubated, the tip of the endotracheal tube appears in  satisfactory position, approximately 2.8 cm superior to the jessie.  There are interstitial infiltrates in the left midlung and lower lung  zones, with associated volume loss. The right lung is clear. There is  mild cardiomegaly, this may be exaggerated by technique and positioning.  The patient is on a backboard. There is a stent in the left subclavian  and axillary region as before. No pneumothorax or pleural effusion is  identified. The upper abdomen appears unremarkable.       Impression:      Satisfactory position of the endotracheal tube. Interstitial  infiltrates are seen throughout most of the left lung without  consolidation. There is mild cardiomegaly, without evidence of overt  CHF.  This report was finalized on 03/05/2022 10:37 by Dr. Farhad Munoz MD.          Culture:  Blood Culture   Date Value Ref Range Status   03/05/2022 Staphylococcus, coagulase negative (C)  Preliminary   03/05/2022 No growth at 24 hours  Preliminary     Urine Culture   Date Value Ref Range Status   03/05/2022 50,000 CFU/mL Staphylococcus, coagulase negative (A)  Final     Comment:     By laboratory criteria, addititional testing is not indicated and unlikely to produce clinically relevant information.  Coagulase negative staphylococci are common skin contaminants, members of the oral lilly,  and of low virulence;  requires clinical correlation.           Assessment   1.  End stage renal disease on HD TTS  2.  Type 2 diabetes   3.  Hypertension  4.  Hyperosmolar hyperglycemia--improving  5.  Metabolic acidosis  6.  Metabolic encephalopathy  7.  Hyponatremia  8.  Anemia of CKD  9.  Secondary hyperparathyroidism    Plan:  1.  Dialysis next due 3/08  2.  Hyperkalemia was secondary to overaggressive potassium administration. Need to use caution with DKA protocol in an ESRD patient.   3.  OK to medical floor from renal standpoint    Delmar Salazar, APRN  3/7/2022  09:15 CST

## 2022-03-07 NOTE — PLAN OF CARE
Goal Outcome Evaluation:  Plan of Care Reviewed With: patient        Progress: no change  Outcome Evaluation: Pt transfered from critical care unit to med/surg unit today. Pt is more alert and oriented, following commands. Monitor BG every 4 hours. SSI per MD order, see MAR. Plan for hemodialysis tomorrow per nephrology order. Tolerating diet.  following for d/c planning.

## 2022-03-07 NOTE — NURSING NOTE
Orders to transfer out of ICU.  Vital signs stable throughout the shift.  ST evaluated as documented.  Diet ordered.  Patient ate well for lunch.  No issues noted with swallowing.  Wound care consulted.  2 small bowel movements noted.  Emma area cleaned and ointment applied per wound care orders.  Report called to nurse.  No there issues.

## 2022-03-07 NOTE — CONSULTS
"    [facility]  INPATIENT WOUND CONSULTATION    Today's Date: 03/07/22    Patient Name: Ena Upton  MRN: [id number]  CSN: [id number]  PCP: Yaron Wiggins APRN  Referring Provider:   Consulting Provider (From admission, onward)    Start Ordered     Status Ordering Provider    03/05/22 1257 03/05/22 1258  Wound / Ostomy  Care Provider Consult  Once        Specialty:  Wound Care  Provider:  (Not yet assigned)    Acknowledged WINDY RON         Attending Provider: Thomas Oliver DO  Length of Stay: 2    SUBJECTIVE   Chief Complaint: Wound of buttocks     HPI: Ena Upton, a 52 y.o.female, presents with a past medical history of end-stage renal disease, insulin-dependent diabetes, hypertension.  A full past medical history as listed below.  Patient presented to the emergency department by EMS after being found unresponsive.  Glucose checked which read \"HIGH\" (>900).  IO placed in the field.  Patient was intubated and sedated and placed on Cardene drip.  Currently, she is resting in ICU, was extubated yesterday.  She is lethargic but able to answer questions but remains a poor historian.      Inpatient wound care is consulted due to a wound of the buttocks.  Patient has a DTI of the sacral region with scarring on bilateral buttocks and sacrococcygeal area.  Patient states she does not walk and uses a wheelchair.  She states she has had wound for a long time.  She says she went to the wound care clinic in [place].  When asked what she does for the wound now, she says there is nothing to do for it. Not sure exactly how long she has had wound or how long it has been since she was at wound clinic.        Visit Dx:    ICD-10-CM ICD-9-CM   1. Altered mental status, unspecified altered mental status type  R41.82 780.97   2. 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)   • Non-ketotic " hyperosmolar coma (HCC)   • HTN (hypertension)   • Intractable nausea and vomiting   • Constipation   • Gastroparesis due to DM (HCC)   • Hyperglycemia   • Seizure (HCC)   • ESRD on hemodialysis (HCC)   • Sacral insufficiency fracture   • Hypertensive urgency   • Uncontrolled type 2 diabetes mellitus with hyperglycemia (HCC)   • Hyperkalemia   • Metabolic encephalopathy   • Hypertensive emergency   • Acute pulmonary edema (HCC)   • Hypothyroidism   • HHNC (hyperglycemic hyperosmolar nonketotic coma) (HCC)   • Uremic encephalopathy syndrome   • Hyponatremia   • Elevated troponin due to ESRD and hypertensive urgency   • Acute respiratory failure secondary to an inability to protect her airway   • Tobacco abuse   • Anemia, chronic disease       History:   Past Medical History:   Diagnosis Date   • Anxiety    • Arthritis    • Chronic kidney disease     STAGE V RENAL FAILURE   • Depression    • Diabetes mellitus (HCC)    • HTN (hypertension)    • Hypothyroidism    • Obesity    • Tremors of nervous system      Past Surgical History:   Procedure Laterality Date   • ARTERIOVENOUS FISTULA     • BREAST BIOPSY     • CARPAL TUNNEL RELEASE     • CATH LAB PROCEDURE     • CHOLECYSTECTOMY     • TUBAL ABDOMINAL LIGATION       Social History     Socioeconomic History   • Marital status:    Tobacco Use   • Smoking status: Current Every Day Smoker     Packs/day: 0.50   • Smokeless tobacco: Never Used   • Tobacco comment: CUTTING BACK AND TRYING TO STOP SMOKING   Substance and Sexual Activity   • Alcohol use: No   • Drug use: No   • Sexual activity: Defer     Family History   Problem Relation Age of Onset   • Cancer Other    • Hypertension Other    • Kidney disease Other    • Heart disease Other    • Diabetes Other    • Diabetes Mother    • Diabetes Maternal Aunt    • Diabetes Maternal Grandmother        Allergies:  Allergies   Allergen Reactions   • Penicillins Hives and Shortness Of Breath   • Lamisil [Terbinafine]    • Reglan  [Metoclopramide] GI Intolerance   • Stadol [Butorphanol] Nausea And Vomiting       Medications:    Current Facility-Administered Medications:   •  acetaminophen (TYLENOL) tablet 650 mg, 650 mg, Oral, Q4H PRN, 650 mg at 03/07/22 1013 **OR** acetaminophen (TYLENOL) 160 MG/5ML solution 650 mg, 650 mg, Oral, Q4H PRN **OR** acetaminophen (TYLENOL) suppository 650 mg, 650 mg, Rectal, Q4H PRN, Thomas Oliver DO  •  atorvastatin (LIPITOR) tablet 20 mg, 20 mg, Oral, Nightly, Heath Prado MD, 20 mg at 03/06/22 2028  •  carvedilol (COREG) tablet 25 mg, 25 mg, Oral, BID With Meals, Thomas Oliver DO  •  cefTRIAXone (ROCEPHIN) 1 g/100 mL 0.9% NS (MBP), 1 g, Intravenous, Q24H, Heath Prado MD, 1 g at 03/07/22 1107  •  dextrose (D50W) (25 g/50 mL) IV injection 12.5 g, 12.5 g, Intravenous, PRN, Heath Prado MD, 12.5 g at 03/06/22 2212  •  dextrose (D50W) (25 g/50 mL) IV injection 25 g, 25 g, Intravenous, Q15 Min PRN, Ean Dumont MD, 25 g at 03/07/22 0404  •  dextrose (D5W) 5 % infusion, 50 mL/hr, Intravenous, Continuous, Thomas Oliver DO, Last Rate: 50 mL/hr at 03/07/22 1107, 50 mL/hr at 03/07/22 1107  •  dextrose (GLUTOSE) oral gel 15 g, 15 g, Oral, Q15 Min PRN, Ean Dumont MD  •  DULoxetine (CYMBALTA) DR capsule 60 mg, 60 mg, Oral, Daily, Thomas Oliver DO, 60 mg at 03/07/22 1016  •  enoxaparin (LOVENOX) syringe 30 mg, 30 mg, Subcutaneous, Q24H, Heath Prado MD, 30 mg at 03/06/22 1609  •  famotidine (PEPCID) tablet 20 mg, 20 mg, Oral, Daily, Thomas Oliver DO, 20 mg at 03/07/22 1016  •  glucagon (human recombinant) (GLUCAGEN DIAGNOSTIC) injection 1 mg, 1 mg, Intramuscular, Q15 Min PRN, Ean Dumont MD  •  hydrALAZINE (APRESOLINE) tablet 75 mg, 75 mg, Oral, Q8H, Thomas Oliver DO  •  insulin detemir (LEVEMIR) injection 20 Units, 20 Units, Subcutaneous, Nightly, Ean Dumont MD, 20 Units at 03/06/22 0447  •  insulin lispro (humaLOG) injection 2-7 Units, 2-7  Units, Subcutaneous, Q4H, Ean Dumont MD  •  ipratropium-albuterol (DUO-NEB) nebulizer solution 3 mL, 3 mL, Nebulization, 4x Daily - RT, Heath Prado MD, 3 mL at 03/07/22 1032  •  isosorbide mononitrate (IMDUR) 24 hr tablet 60 mg, 60 mg, Oral, Q24H, Thomas Oliver DO, 60 mg at 03/07/22 1014  •  labetalol (NORMODYNE,TRANDATE) injection 10 mg, 10 mg, Intravenous, Q4H PRN, Heath Prado MD, 10 mg at 03/07/22 0411  •  levothyroxine (SYNTHROID, LEVOTHROID) tablet 50 mcg, 50 mcg, Oral, Q AM, Heath Prado MD, 50 mcg at 03/07/22 0531  •  LORazepam (ATIVAN) injection 1 mg, 1 mg, Intravenous, Q4H PRN, Heath Prado MD, 1 mg at 03/05/22 1303  •  Morphine sulfate (PF) injection 2 mg, 2 mg, Intravenous, Q4H PRN, Heath Prado MD, 2 mg at 03/05/22 1303  •  nicotine (NICODERM CQ) 14 MG/24HR patch 1 patch, 1 patch, Transdermal, Q24H, Thomas Oliver DO  •  NIFEdipine XL (PROCARDIA XL) 24 hr tablet 60 mg, 60 mg, Oral, Q24H, Thomas Oliver DO, 60 mg at 03/07/22 1014  •  ondansetron (ZOFRAN) injection 4 mg, 4 mg, Intravenous, Q6H PRN, Heath Prado MD  •  sodium chloride 0.45 % infusion, 50 mL/hr, Intravenous, Continuous PRN, Heath Prado MD  •  sodium chloride 0.9 % flush 10 mL, 10 mL, Intravenous, Q12H, Heath Prado MD, 10 mL at 03/07/22 0814  •  sodium chloride 0.9 % flush 10 mL, 10 mL, Intravenous, PRN, Heath Prado MD  •  sodium chloride 0.9 % flush 10 mL, 10 mL, Intravenous, Once PRN, Heath Prado MD  •  sodium chloride 0.9 % flush 10 mL, 10 mL, Intravenous, Q12H, Heath Prado MD, 10 mL at 03/07/22 0814  •  sodium chloride 0.9 % flush 10 mL, 10 mL, Intravenous, PRN, Heath Prado MD  •  sodium chloride 0.9 % infusion, 50 mL/hr, Intravenous, Continuous PRN, Heath Prado MD  •  sodium chloride 0.9 % infusion, 10 mL/hr, Intravenous, Continuous PRN, Heath Prado MD    Review of Systems:   Review of  Systems   Constitutional: Positive for activity change and fatigue.   Respiratory: Positive for cough and shortness of breath.    Cardiovascular: Negative for chest pain and palpitations.   Gastrointestinal: Negative for diarrhea, nausea and vomiting.   Genitourinary: Negative for frequency and urgency.   Musculoskeletal: Positive for gait problem and myalgias. Negative for arthralgias.   Skin: Positive for color change and wound.   Neurological: Positive for weakness. Negative for dizziness.   Psychiatric/Behavioral: Positive for confusion. Negative for agitation and behavioral problems.         OBJECTIVE     Vitals:    03/07/22 1040   BP:    Pulse: 84   Resp: 8   Temp:    SpO2: 100%       PHYSICAL EXAM  Physical Exam  Vitals and nursing note reviewed.   Constitutional:       Appearance: She is obese.      Comments: Body mass index is 30.77 kg/m².    HENT:      Head: Normocephalic and atraumatic.   Eyes:      Comments: Edema of bilateral eyes   Cardiovascular:      Rate and Rhythm: Normal rate and regular rhythm.   Pulmonary:      Effort: Pulmonary effort is normal. No respiratory distress.   Musculoskeletal:      Cervical back: Normal range of motion and neck supple.   Skin:     General: Skin is warm and dry.      Findings: Wound present.      Comments: DTI of sacral region with scaring of bilateral buttocks.  Wound base is purple and non-blanchable.  Slight desquamation present with no open drainage areas.  Edges are irregular.  Pink non-blanchable areas present to bilateral buttocks.    Neurological:      Mental Status: She is oriented to person, place, and time. She is lethargic and confused.   Psychiatric:         Attention and Perception: She is inattentive.         Behavior: Behavior is cooperative.                  Results Review:  Lab Results (last 48 hours)     Procedure Component Value Units Date/Time    Blood Culture - Blood, Arm, Left [[id number]]  (Normal) Collected: 03/05/22 1010    Specimen: Blood  from Arm, Left Updated: 03/07/22 1100     Blood Culture No growth at 2 days    POC Glucose Once [[id number]]  (Normal) Collected: 03/07/22 1021    Specimen: Blood Updated: 03/07/22 1033     Glucose 107 mg/dL      Comment: : SABINE Walsh BrandyMeter ID: [id number]       POC Glucose Once [[id number]]  (Normal) Collected: 03/07/22 0817    Specimen: Blood Updated: 03/07/22 0828     Glucose 90 mg/dL      Comment: : SABINE Walsh BrandyMeter ID: [id number]       POC Glucose Once [[id number]]  (Normal) Collected: 03/07/22 0714    Specimen: Blood Updated: 03/07/22 0726     Glucose 81 mg/dL      Comment: : SABINE Walsh BrandyMeter ID: [id number]       Blood Culture - Blood, Arm, Right [[id number]]  (Abnormal) Collected: 03/05/22 1022    Specimen: Blood from Arm, Right Updated: 03/07/22 0717     Blood Culture Staphylococcus, coagulase negative     Isolated from Anaerobic Bottle     Gram Stain Anaerobic Bottle Gram positive cocci in clusters    Narrative:      Probable contaminant requires clinical correlation, susceptibility not performed unless requested by physician.      POC Glucose Once [[id number]]  (Normal) Collected: 03/07/22 0619    Specimen: Blood Updated: 03/07/22 0631     Glucose 80 mg/dL      Comment: : [id number] Liam Murphy ID: [id number]       Phosphorus [[id number]]  (Normal) Collected: 03/07/22 0426    Specimen: Blood Updated: 03/07/22 0603     Phosphorus 4.0 mg/dL     Comprehensive Metabolic Panel [[id number]]  (Abnormal) Collected: 03/07/22 0426    Specimen: Blood Updated: 03/07/22 0549     Glucose 117 mg/dL      BUN 36 mg/dL      Creatinine 4.01 mg/dL      Sodium 128 mmol/L      Potassium 5.1 mmol/L      Chloride 93 mmol/L      CO2 22.0 mmol/L      Calcium 8.1 mg/dL      Total Protein 5.4 g/dL      Albumin 2.70 g/dL      ALT (SGPT) 13 U/L      AST (SGOT) 23 U/L      Alkaline Phosphatase 103 U/L      Total Bilirubin 0.4 mg/dL      Globulin 2.7 gm/dL      A/G Ratio 1.0 g/dL       BUN/Creatinine Ratio 9.0     Anion Gap 13.0 mmol/L      eGFR 12.8 mL/min/1.73      Comment: <15 Indicative of kidney failure       Narrative:      GFR Normal >60  Chronic Kidney Disease <60  Kidney Failure <15      Vancomycin, Random [[id number]]  (Normal) Collected: 03/07/22 0426    Specimen: Blood Updated: 03/07/22 0549     Vancomycin Random 13.90 mcg/mL     CBC (No Diff) [[id number]]  (Abnormal) Collected: 03/07/22 0426    Specimen: Blood Updated: 03/07/22 0530     WBC 7.43 10*3/mm3      RBC 2.92 10*6/mm3      Hemoglobin 8.3 g/dL      Hematocrit 26.3 %      MCV 90.1 fL      MCH 28.4 pg      MCHC 31.6 g/dL      RDW 17.1 %      RDW-SD 56.5 fl      MPV 10.6 fL      Platelets 156 10*3/mm3     POC Glucose Once [[id number]]  (Abnormal) Collected: 03/07/22 0356    Specimen: Blood Updated: 03/07/22 0407     Glucose 54 mg/dL      Comment: : [id number] Wheeler KarenMeter ID: [id number]       POC Glucose Once [[id number]]  (Normal) Collected: 03/07/22 0229    Specimen: Blood Updated: 03/07/22 0250     Glucose 90 mg/dL      Comment: : [id number] [email or web address] ID: [id number]       POC Glucose Once [[id number]]  (Normal) Collected: 03/07/22 0159    Specimen: Blood Updated: 03/07/22 0221     Glucose 96 mg/dL      Comment: : [id number] [email or web address] ID: [id number]       POC Glucose Once [[id number]]  (Normal) Collected: 03/07/22 0100    Specimen: Blood Updated: 03/07/22 0121     Glucose 92 mg/dL      Comment: : [id number] [email or web address] ID: [id number]       POC Glucose Once [[id number]]  (Abnormal) Collected: 03/07/22 0014    Specimen: Blood Updated: 03/07/22 0036     Glucose 56 mg/dL      Comment: : [id number] [email or web address] ID: [id number]       POC Glucose Once [[id number]]  (Normal) Collected: 03/06/22 2309    Specimen: Blood Updated: 03/06/22 2320     Glucose 91 mg/dL      Comment: : [id number] Liam AndersonCentral Park Hospitaler ID: [id number]       POC Glucose Once [[id number]]  (Normal) Collected: 03/06/22 2217     Specimen: Blood Updated: 03/06/22 2221     Glucose 73 mg/dL      Comment: : [id number] Liam MaherenMeter ID: [id number]       POC Glucose Once [[id number]]  (Abnormal) Collected: 03/06/22 2106    Specimen: Blood Updated: 03/06/22 2127     Glucose 65 mg/dL      Comment: : [id number] Liam MaherenMeter ID: [id number]       POC Glucose Once [[id number]]  (Abnormal) Collected: 03/06/22 1956    Specimen: Blood Updated: 03/06/22 2018     Glucose 55 mg/dL      Comment: : [id number] Liam MaherenMeter ID: [id number]       POC Glucose Once [[id number]]  (Normal) Collected: 03/06/22 1847    Specimen: Blood Updated: 03/06/22 1908     Glucose 89 mg/dL      Comment: : [id number] Marc AshleyMeter ID: [id number]       Basic Metabolic Panel [[id number]]  (Abnormal) Collected: 03/06/22 1840    Specimen: Blood Updated: 03/06/22 1907     Glucose 95 mg/dL      BUN 33 mg/dL      Creatinine 3.63 mg/dL      Sodium 130 mmol/L      Potassium 4.6 mmol/L      Chloride 94 mmol/L      CO2 23.0 mmol/L      Calcium 8.6 mg/dL      BUN/Creatinine Ratio 9.1     Anion Gap 13.0 mmol/L      eGFR 14.4 mL/min/1.73      Comment: <15 Indicative of kidney failure       Narrative:      GFR Normal >60  Chronic Kidney Disease <60  Kidney Failure <15      POC Glucose Once [[id number]]  (Abnormal) Collected: 03/06/22 1751    Specimen: Blood Updated: 03/06/22 1812     Glucose 68 mg/dL      Comment: : [id number] Marc AshleyMeter ID: [id number]       POC Glucose Once [[id number]]  (Normal) Collected: 03/06/22 1709    Specimen: Blood Updated: 03/06/22 1723     Glucose 103 mg/dL      Comment: : [id number] Marc AshleyMeter ID: [id number]       POC Glucose Once [[id number]]  (Abnormal) Collected: 03/06/22 1608    Specimen: Blood Updated: 03/06/22 1619     Glucose 63 mg/dL      Comment: : [id number] Marc AshleyMeter ID: [id number]       Basic Metabolic Panel [[id number]]  (Abnormal) Collected: 03/06/22 1357    Specimen: Blood Updated: 03/06/22  1455     Glucose 144 mg/dL      BUN 33 mg/dL      Creatinine 3.52 mg/dL      Sodium 128 mmol/L      Potassium 6.6 mmol/L      Comment: Slight hemolysis detected by analyzer. Results may be affected.        Chloride 95 mmol/L      CO2 20.0 mmol/L      Calcium 8.2 mg/dL      BUN/Creatinine Ratio 9.4     Anion Gap 13.0 mmol/L      eGFR 14.9 mL/min/1.73      Comment: <15 Indicative of kidney failure       Narrative:      GFR Normal >60  Chronic Kidney Disease <60  Kidney Failure <15      POC Glucose Once [[id number]]  (Normal) Collected: 03/06/22 1402    Specimen: Blood Updated: 03/06/22 1422     Glucose 122 mg/dL      Comment: : [id number] Marc AshleyMeter ID: [id number]       POC Glucose Once [[id number]]  (Abnormal) Collected: 03/06/22 1329    Specimen: Blood Updated: 03/06/22 1350     Glucose 69 mg/dL      Comment: : [id number] Marc AshleyMeter ID: [id number]       POC Glucose Once [[id number]]  (Normal) Collected: 03/06/22 1147    Specimen: Blood Updated: 03/06/22 1159     Glucose 100 mg/dL      Comment: : [id number] Marc AshleyMeter ID: [id number]       POC Glucose Once [[id number]]  (Normal) Collected: 03/06/22 1116    Specimen: Blood Updated: 03/06/22 1137     Glucose 89 mg/dL      Comment: : [id number] Marc AshleyMeter ID: [id number]       POC Glucose Once [[id number]]  (Normal) Collected: 03/06/22 1036    Specimen: Blood Updated: 03/06/22 1106     Glucose 85 mg/dL      Comment: : [id number] Marc AshleyMeter ID: [id number]       POC Glucose Once [[id number]]  (Normal) Collected: 03/06/22 1019    Specimen: Blood Updated: 03/06/22 1042     Glucose 101 mg/dL      Comment: : [id number] Marc AshleyMeter ID: [id number]       Urine Culture - Urine, Urine, Catheter [[id number]]  (Abnormal) Collected: 03/05/22 1018    Specimen: Urine, Catheter Updated: 03/06/22 1026     Urine Culture 50,000 CFU/mL Staphylococcus, coagulase negative     Comment: By laboratory criteria, addititional testing is  not indicated and unlikely to produce clinically relevant information.  Coagulase negative staphylococci are common skin contaminants, members of the oral lilly,  and of low virulence; requires clinical correlation.         POC Glucose Once [[id number]]  (Abnormal) Collected: 03/06/22 0949    Specimen: Blood Updated: 03/06/22 1000     Glucose 52 mg/dL      Comment: : [id number] Marc AshleyMeter ID: [id number]       POC Glucose Once [[id number]]  (Abnormal) Collected: 03/06/22 0949    Specimen: Blood Updated: 03/06/22 1000     Glucose 43 mg/dL      Comment: : [id number] Marc MedikidzleyMeter ID: [id number]       Blood Culture ID, PCR - Blood, Arm, Right [[id number]]  (Abnormal) Collected: 03/05/22 1022    Specimen: Blood from Arm, Right Updated: 03/06/22 0851     BCID, PCR Staph spp, not aureus or lugdunesis. Identification by BCID2 PCR.     BOTTLE TYPE Anaerobic Bottle    POC Glucose Once [[id number]]  (Normal) Collected: 03/06/22 0820    Specimen: Blood Updated: 03/06/22 0840     Glucose 90 mg/dL      Comment: : [id number] Marc MedikidzleyMeter ID: [id number]       POC Glucose Once [[id number]]  (Normal) Collected: 03/06/22 0748    Specimen: Blood Updated: 03/06/22 0809     Glucose 121 mg/dL      Comment: : [id number] Marc MedikidzleyMeter ID: [id number]       POC Glucose Once [[id number]]  (Abnormal) Collected: 03/06/22 0724    Specimen: Blood Updated: 03/06/22 0745     Glucose 66 mg/dL      Comment: : [id number] Marc MedikidzleyMeter ID: [id number]       POC Glucose Once [[id number]]  (Normal) Collected: 03/06/22 0658    Specimen: Blood Updated: 03/06/22 0709     Glucose 120 mg/dL      Comment: : [id number] Liam AndersonMeter ID: [id number]       POC Glucose Once [[id number]]  (Abnormal) Collected: 03/06/22 0647    Specimen: Blood Updated: 03/06/22 0658     Glucose 29 mg/dL      Comment: : [id number] Liam MaherenMeter ID: [id number]       Basic Metabolic Panel [[id number]]  (Abnormal) Collected: 03/06/22  0420    Specimen: Blood Updated: 03/06/22 0536     Glucose 66 mg/dL      BUN 29 mg/dL      Creatinine 3.24 mg/dL      Sodium 134 mmol/L      Potassium 3.8 mmol/L      Chloride 97 mmol/L      CO2 24.0 mmol/L      Calcium 8.5 mg/dL      BUN/Creatinine Ratio 9.0     Anion Gap 13.0 mmol/L      eGFR 16.5 mL/min/1.73      Comment: National Kidney Foundation and American Society of Nephrology (ASN) Task Force recommended calculation based on the Chronic Kidney Disease Epidemiology Collaboration (CKD-EPI) equation refit without adjustment for race.       Narrative:      GFR Normal >60  Chronic Kidney Disease <60  Kidney Failure <15      Phosphorus [[id number]]  (Normal) Collected: 03/06/22 0420    Specimen: Blood Updated: 03/06/22 0533     Phosphorus 3.2 mg/dL     Magnesium [[id number]]  (Normal) Collected: 03/06/22 0420    Specimen: Blood Updated: 03/06/22 0530     Magnesium 2.1 mg/dL     POC Glucose Once [[id number]]  (Abnormal) Collected: 03/06/22 0439    Specimen: Blood Updated: 03/06/22 0459     Glucose 65 mg/dL      Comment: [id number]: HARMAN RossMeter ID: [id number]       POC Glucose Once [[id number]]  (Abnormal) Collected: 03/06/22 0339    Specimen: Blood Updated: 03/06/22 0400     Glucose 57 mg/dL      Comment: : HARMAN RossMeter ID: [id number]       Blood Gas, Arterial - [[id number]]  (Abnormal) Collected: 03/06/22 0310    Specimen: Arterial Blood Updated: 03/06/22 0312     Site Right Radial     Aaron's Test Positive     pH, Arterial 7.421 pH units      pCO2, Arterial 43.3 mm Hg      pO2, Arterial 110.0 mm Hg      Comment: 83 Value above reference range        HCO3, Arterial 28.2 mmol/L      Comment: 83 Value above reference range        Base Excess, Arterial 3.3 mmol/L      Comment: 83 Value above reference range        O2 Saturation, Arterial 98.9 %      Temperature 37.0 C      Barometric Pressure for Blood Gas 749 mmHg      Modality Ventilator     FIO2 30 %      Ventilator  Mode AC     Set Tidal Volume 500     Set [facility] Resp Rate 18.0     PEEP 5.0     Collected by [id number]     Comment: Meter: [id number]     :  [id number]        pCO2, Temperature Corrected 43.3 mm Hg      pH, Temp Corrected 7.421 pH Units      pO2, Temperature Corrected 110 mm Hg     POC Glucose Once [[id number]]  (Abnormal) Collected: 03/06/22 0225    Specimen: Blood Updated: 03/06/22 0247     Glucose 63 mg/dL      Comment: : CMCINTYRRadha SaraviainaMeter ID: [id number]       POC Glucose Once [[id number]]  (Abnormal) Collected: 03/06/22 0136    Specimen: Blood Updated: 03/06/22 0155     Glucose 69 mg/dL      Comment: : CMCINTYRRadha SaraviainaMeter ID: [id number]       Troponin [[id number]]  (Abnormal) Collected: 03/06/22 0024    Specimen: Blood Updated: 03/06/22 0134     Troponin T 0.114 ng/mL     Narrative:      Troponin T Reference Range:  <= 0.03 ng/mL-   Negative for AMI  >0.03 ng/mL-     Abnormal for myocardial necrosis.  Clinicians would have to utilize clinical acumen, EKG, Troponin and serial changes to determine if it is an Acute Myocardial Infarction or myocardial injury due to an underlying chronic condition.       Results may be falsely decreased if patient taking Biotin.      Comprehensive Metabolic Panel [[id number]]  (Abnormal) Collected: 03/06/22 0024    Specimen: Blood Updated: 03/06/22 0119     Glucose 188 mg/dL      BUN 28 mg/dL      Creatinine 3.09 mg/dL      Sodium 132 mmol/L      Potassium 3.2 mmol/L      Chloride 96 mmol/L      CO2 25.0 mmol/L      Calcium 8.2 mg/dL      Total Protein 5.3 g/dL      Albumin 2.60 g/dL      ALT (SGPT) 15 U/L      AST (SGOT) 17 U/L      Alkaline Phosphatase 85 U/L      Total Bilirubin 0.3 mg/dL      Globulin 2.7 gm/dL      A/G Ratio 1.0 g/dL      BUN/Creatinine Ratio 9.1     Anion Gap 11.0 mmol/L      eGFR 17.4 mL/min/1.73      Comment: National Kidney Foundation and American Society of Nephrology (ASN) Task Force recommended calculation  based on the Chronic Kidney Disease Epidemiology Collaboration (CKD-EPI) equation refit without adjustment for race.       Narrative:      GFR Normal >60  Chronic Kidney Disease <60  Kidney Failure <15      Phosphorus [[id number]]  (Normal) Collected: 03/06/22 0024    Specimen: Blood Updated: 03/06/22 0117     Phosphorus 3.1 mg/dL     Magnesium [[id number]]  (Normal) Collected: 03/06/22 0024    Specimen: Blood Updated: 03/06/22 0114     Magnesium 2.0 mg/dL     POC Glucose Once [[id number]]  (Normal) Collected: 03/06/22 0013    Specimen: Blood Updated: 03/06/22 0034     Glucose 97 mg/dL      Comment: : MARIELYYRRadha SaraviainaMeter ID: [id number]       POC Glucose Once [[id number]]  (Abnormal) Collected: 03/05/22 2316    Specimen: Blood Updated: 03/05/22 2334     Glucose 167 mg/dL      Comment: : CMCESTER SaraviainaMeter ID: [id number]       POC Glucose Once [[id number]]  (Abnormal) Collected: 03/05/22 2206    Specimen: Blood Updated: 03/05/22 2217     Glucose 279 mg/dL      Comment: : [id number] Liam MaherenMeter ID: [id number]       POC Glucose Once [[id number]]  (Abnormal) Collected: 03/05/22 2113    Specimen: Blood Updated: 03/05/22 2129     Glucose 314 mg/dL      Comment: : [email or web address] Rowland ChristinaMeter ID: [id number]       Troponin [[id number]]  (Abnormal) Collected: 03/05/22 2000    Specimen: Blood Updated: 03/05/22 2043     Troponin T 0.118 ng/mL     Narrative:      Troponin T Reference Range:  <= 0.03 ng/mL-   Negative for AMI  >0.03 ng/mL-     Abnormal for myocardial necrosis.  Clinicians would have to utilize clinical acumen, EKG, Troponin and serial changes to determine if it is an Acute Myocardial Infarction or myocardial injury due to an underlying chronic condition.       Results may be falsely decreased if patient taking Biotin.      Basic Metabolic Panel [[id number]]  (Abnormal) Collected: 03/05/22 2000    Specimen: Blood Updated: 03/05/22 2035     Glucose 378  mg/dL      BUN 26 mg/dL      Creatinine 2.91 mg/dL      Sodium 132 mmol/L      Potassium 3.0 mmol/L      Chloride 94 mmol/L      CO2 26.0 mmol/L      Calcium 8.1 mg/dL      BUN/Creatinine Ratio 8.9     Anion Gap 12.0 mmol/L      eGFR 18.7 mL/min/1.73      Comment: National Kidney Foundation and American Society of Nephrology (ASN) Task Force recommended calculation based on the Chronic Kidney Disease Epidemiology Collaboration (CKD-EPI) equation refit without adjustment for race.       Narrative:      GFR Normal >60  Chronic Kidney Disease <60  Kidney Failure <15      Phosphorus [[id number]]  (Normal) Collected: 03/05/22 2000    Specimen: Blood Updated: 03/05/22 2032     Phosphorus 2.8 mg/dL     Magnesium [[id number]]  (Normal) Collected: 03/05/22 2000    Specimen: Blood Updated: 03/05/22 2029     Magnesium 2.1 mg/dL     POC Glucose Once [[id number]]  (Abnormal) Collected: 03/05/22 1953    Specimen: Blood Updated: 03/05/22 2006     Glucose 359 mg/dL      Comment: IDRIPOperator: [email or web address] Kashif Avendaño ID: [id number]       POC Glucose Once [[id number]]  (Abnormal) Collected: 03/05/22 1831    Specimen: Blood Updated: 03/05/22 1845     Glucose 427 mg/dL      Comment: : SABINE Jillian PatelGabriela ID: [id number]       Troponin [[id number]]  (Abnormal) Collected: 03/05/22 1730    Specimen: Blood Updated: 03/05/22 1814     Troponin T 0.111 ng/mL     Narrative:      Troponin T Reference Range:  <= 0.03 ng/mL-   Negative for AMI  >0.03 ng/mL-     Abnormal for myocardial necrosis.  Clinicians would have to utilize clinical acumen, EKG, Troponin and serial changes to determine if it is an Acute Myocardial Infarction or myocardial injury due to an underlying chronic condition.       Results may be falsely decreased if patient taking Biotin.      Phosphorus [[id number]]  (Normal) Collected: 03/05/22 1730    Specimen: Blood Updated: 03/05/22 1813     Phosphorus 3.0 mg/dL     Basic Metabolic Panel [[id number]]   (Abnormal) Collected: 03/05/22 1730    Specimen: Blood Updated: 03/05/22 1813     Glucose 443 mg/dL      BUN 23 mg/dL      Creatinine 2.51 mg/dL      Sodium 132 mmol/L      Potassium 3.6 mmol/L      Chloride 94 mmol/L      CO2 25.0 mmol/L      Calcium 8.3 mg/dL      BUN/Creatinine Ratio 9.2     Anion Gap 13.0 mmol/L      eGFR 22.4 mL/min/1.73      Comment: National Kidney Foundation and American Society of Nephrology (ASN) Task Force recommended calculation based on the Chronic Kidney Disease Epidemiology Collaboration (CKD-EPI) equation refit without adjustment for race.       Narrative:      GFR Normal >60  Chronic Kidney Disease <60  Kidney Failure <15      Magnesium [[id number]]  (Normal) Collected: 03/05/22 1730    Specimen: Blood Updated: 03/05/22 1759     Magnesium 2.1 mg/dL     POC Glucose Once [[id number]]  (Abnormal) Collected: 03/05/22 1721    Specimen: Blood Updated: 03/05/22 1733     Glucose 468 mg/dL      Comment: : SABINE Mead BrandyMeter ID: [id number]       POC Glucose Once [[id number]]  (Abnormal) Collected: 03/05/22 1618    Specimen: Blood Updated: 03/05/22 1640     Glucose 427 mg/dL      Comment: : [email or web address] [employer] CapitalyMeter ID: [id number]       POC Glucose Once [[id number]]  (Abnormal) Collected: 03/05/22 1506    Specimen: Blood Updated: 03/05/22 1519     Glucose 497 mg/dL      Comment: : [id number] Earline Hernandez ID: [id number]       Osmolality, Serum [[id number]]  (Abnormal) Collected: 03/05/22 1237    Specimen: Blood Updated: 03/05/22 1403     Osmolality 323 mOsm/kg     Basic Metabolic Panel [[id number]]  (Abnormal) Collected: 03/05/22 1237    Specimen: Blood Updated: 03/05/22 1330     Glucose 921 mg/dL      BUN 51 mg/dL      Creatinine 4.80 mg/dL      Sodium 120 mmol/L      Potassium 3.9 mmol/L      Chloride 83 mmol/L      CO2 21.0 mmol/L      Calcium 8.4 mg/dL      BUN/Creatinine Ratio 10.6     Anion Gap 16.0 mmol/L      eGFR 10.3 mL/min/1.73      Comment: <15  Indicative of kidney failure       Narrative:      GFR Normal >60  Chronic Kidney Disease <60  Kidney Failure <15      Troponin [[id number]]  (Abnormal) Collected: 03/05/22 1237    Specimen: Blood Updated: 03/05/22 1303     Troponin T 0.096 ng/mL     Narrative:      Troponin T Reference Range:  <= 0.03 ng/mL-   Negative for AMI  >0.03 ng/mL-     Abnormal for myocardial necrosis.  Clinicians would have to utilize clinical acumen, EKG, Troponin and serial changes to determine if it is an Acute Myocardial Infarction or myocardial injury due to an underlying chronic condition.       Results may be falsely decreased if patient taking Biotin.      Phosphorus [[id number]]  (Abnormal) Collected: 03/05/22 1237    Specimen: Blood Updated: 03/05/22 1300     Phosphorus 5.0 mg/dL     Magnesium [[id number]]  (Normal) Collected: 03/05/22 1237    Specimen: Blood Updated: 03/05/22 1257     Magnesium 2.4 mg/dL     CBC & Differential [[id number]]  (Abnormal) Collected: 03/05/22 1237    Specimen: Blood Updated: 03/05/22 1244    Narrative:      The following orders were created for panel order CBC & Differential.  Procedure                               Abnormality         Status                     ---------                               -----------         ------                     CBC Auto Differential[[id number]]        Abnormal            Final result                 Please view results for these tests on the individual orders.    CBC Auto Differential [[id number]]  (Abnormal) Collected: 03/05/22 1237    Specimen: Blood Updated: 03/05/22 1244     WBC 8.12 10*3/mm3      RBC 3.20 10*6/mm3      Hemoglobin 9.1 g/dL      Hematocrit 28.7 %      MCV 89.7 fL      MCH 28.4 pg      MCHC 31.7 g/dL      RDW 16.9 %      RDW-SD 56.2 fl      MPV 10.9 fL      Platelets 151 10*3/mm3      Neutrophil % 87.7 %      Lymphocyte % 5.4 %      Monocyte % 4.9 %      Eosinophil % 0.9 %      Basophil % 0.9 %      Immature Grans % 0.2 %      Neutrophils, Absolute  7.12 10*3/mm3      Lymphocytes, Absolute 0.44 10*3/mm3      Monocytes, Absolute 0.40 10*3/mm3      Eosinophils, Absolute 0.07 10*3/mm3      Basophils, Absolute 0.07 10*3/mm3      Immature Grans, Absolute 0.02 10*3/mm3      nRBC 0.0 /100 WBC     Hemoglobin A1c [[id number]]  (Abnormal) Collected: 03/05/22 1010    Specimen: Blood Updated: 03/05/22 1230     Hemoglobin A1C 9.70 %     Narrative:      Hemoglobin A1C Ranges:    Increased Risk for Diabetes  5.7% to 6.4%  Diabetes                     >= 6.5%  Diabetic Goal                < 7.0%        Imaging Results (Last 72 Hours)     Procedure Component Value Units Date/Time    XR Chest 1 View [[id number]] Collected: 03/06/22 0822     Updated: 03/06/22 0826    Narrative:      EXAMINATION: XR CHEST 1 VW- 3/6/2022 8:22 AM CST     HISTORY: Intubated Patient; R41.82-Altered mental status, unspecified.     REPORT: A frontal view of the chest was obtained.     COMPARISON: Chest x-ray 3/5/2022 1002 hours.     The endotracheal tube and NG tube appear in satisfactory position , the  right lung is clear, there are interstitial infiltrates in the left  medial lung extending into the left lung base, with there is also  probable atelectasis. No pneumothorax or pleural effusion is identified.  Vascular stents are noted in the left subclavian region, left upper arm.       Impression:      Interval insertion of an NG tube, in satisfactory position.  The endotracheal tube remains in good position. No change in patchy  interstitial infiltrates in the left lung and streaky atelectasis in the  left lung base.  This report was finalized on 03/06/2022 08:23 by Dr. Farhad Munoz MD.    XR Abdomen KUB [[id number]] Collected: 03/05/22 1723     Updated: 03/05/22 1726    Narrative:      HISTORY: NG placement     KUB: Frontal view of the lower chest and upper abdomen obtained.     Enteric tube tip is present within the body of the stomach. The  side-port at the GE junction. Left retrocardiac  opacities.  Cholecystectomy clips.       Impression:      1. Enteric tube tip in the body the stomach with side-port at the GE  junction.  This report was finalized on 03/05/2022 17:23 by Dr. Desire Castro MD.    CT Cervical Spine Without Contrast [[id number]] Collected: 03/05/22 1047     Updated: 03/05/22 1054    Narrative:      EXAMINATION: CT CERVICAL SPINE WO CONTRAST- 3/5/2022 10:47 AM CST     HISTORY: Neck pain, acute, no red flags; R41.82-Altered mental status,  unspecified. Patient found on floor unresponsive. Unwitnessed fall.     DOSE: 362 mGycm (Automatic exposure control technique was implemented in  an effort to keep the radiation dose as low as possible without  compromising image quality)     REPORT: Spiral CT of the cervical spine was performed without contrast,  reconstructed coronal and sagittal images are also reviewed.     COMPARISON: There are no correlative imaging studies for comparison.     Sagittal images demonstrate straightening of the cervical curvature  without spondylolisthesis. There is moderate degenerative disc disease  at C6-7 with endplate spurring and mild disc space narrowing. No  fracture is identified. The patient is intubated and there is soft  tissue swelling in the hypopharynx. There is normal aeration of the  mastoid air cells. There is mild right-sided neuroforaminal narrowing at  C6-7 related to uncinate spurs. There is mild spinal stenosis at C6-7.  Soft tissue windows show no gross evidence of a traumatic cervical disc  herniation. The visualized upper lungs are clear. There is moderately  heavy calcified plaque within the carotid arteries at the bifurcation.  The       Impression:      No fracture, no acute osseous cervical spine abnormality.  Partial visualization of the endotracheal tube, with soft tissue  swelling in the hypopharynx, not unexpected. Moderate spondylosis at  C6-7, with mild spinal stenosis right-sided neuroforaminal narrowing..              This  report was finalized on 03/05/2022 10:51 by Dr. Farhad Munoz MD.    CT Head Without Contrast [[id number]] Collected: 03/05/22 1044     Updated: 03/05/22 1049    Narrative:      EXAMINATION: CT HEAD WO CONTRAST- 3/5/2022 10:44 AM CST     HISTORY: Delirium; R41.82-Altered mental status, unspecified.  Unresponsive.     DOSE: 625 mGycm (Automatic exposure control technique was implemented in  an effort to keep the radiation dose as low as possible without  compromising image quality)     REPORT: Spiral CT of the head was performed without contrast,  reconstructed coronal and sagittal images are also reviewed.     COMPARISON: CT head without contrast 10/5/2021.     No intracranial hemorrhage, mass or mass effect is identified. There is  mildly decreased attenuation in the periventricular white matter tracts  as before, compatible with mild chronic small vessel white matter  ischemic disease. The ventricles and basal cisterns are within normal  limits. Review of bone windows is negative for fractures. There soft  tissue swelling in the posterior nasopharynx which may be related to  recent endotracheal tube insertion.       Impression:      No acute intracranial abnormality. Mild chronic small vessel  white matter ischemic changes appear stable.     This report was finalized on 03/05/2022 10:46 by Dr. Farhad Munoz MD.    XR Chest 1 View [[id number]] Collected: 03/05/22 1035     Updated: 03/05/22 1040    Narrative:      EXAMINATION: XR CHEST 1 VW- 3/5/2022 10:35 AM CST     HISTORY: intubation; R41.82-Altered mental status, unspecified.     REPORT: A frontal view of the chest was obtained.     COMPARISON: Chest x-ray 10/4/2021.     The patient is intubated, the tip of the endotracheal tube appears in  satisfactory position, approximately 2.8 cm superior to the jessie.  There are interstitial infiltrates in the left midlung and lower lung  zones, with associated volume loss. The right lung is clear. There is  mild  cardiomegaly, this may be exaggerated by technique and positioning.  The patient is on a backboard. There is a stent in the left subclavian  and axillary region as before. No pneumothorax or pleural effusion is  identified. The upper abdomen appears unremarkable.       Impression:      Satisfactory position of the endotracheal tube. Interstitial  infiltrates are seen throughout most of the left lung without  consolidation. There is mild cardiomegaly, without evidence of overt  CHF.  This report was finalized on 03/05/2022 10:37 by Dr. Farhad Munoz MD.             ASSESSMENT/PLAN       Examination and evaluation of wound(s) was performed.    DIAGNOSIS:   Pressure injury of deep tissue of sacral region  Impaired mobility and ADLs  Obesity - Body mass index is 30.77 kg/m².   Metabolic encephalopathy  ESRD on hemodialysis  Uncontrolled type 2 diabetes mellitus with hyperglycemia  Tobacco abuse  Anemia, chronic disease        PLAN:     Start     Ordered    03/07/22 1214  Apply Dolphin mattress Until Discontinued  Until Discontinued        Comments: Patient is to utilize Dolphin Mattress during admission.      Call [employer] to order a [place] bed frame  [phone].  Only need [place] bed frame if a standard dolphin mattress is being ordered.  Do not order bed frame if Bariatric Dolphin is being ordered.  Nurse or Huc is to call [employer], to order the Dolphin Mattress       [phone].  Will have to provide patient's name, room number, if patient is in isolation, and what is being ordered.   Question:  Supply to be applied:  Answer:  Dolphin mattress    03/07/22 1214    03/07/22 1213  Elevate Heels Off of Bed  Until Discontinued         03/07/22 1214    03/07/22 1213  Turn Patient  Now Then Every 2 Hours         03/07/22 1214    03/07/22 1213  Use Repositioning Wedge to Position Patient  Continuous        Comments: Use repositioning sheet and wedges to position patient.    03/07/22 1214    03/07/22 1213  Use  Seat Cushion When Up In Chair  Continuous         03/07/22 1214    Unscheduled  Apply Moisture Barrier After Any Incontinence  As Needed      Comments: [employer]guard   Question:  Wound Care Instructions  Answer:  Apply Moisture Barrier After Any Incontinence    03/07/22 1214    Unscheduled  Skin Care  As Needed      Question Answer Comment   Wound Locations Sacral region, periarea    Wound Care Instructions Clean buttocks and jeremy-area with 4-in-1 barrier cream cloths        03/07/22 1214         Order Date Service Start Date Start Time   03/07/22 Medicine 03/07/22 1216     Pictures of wounds/skin breakdown are to be taken at first assessment of area, every 7 days, prn with any changes, and prior to discharge.   Examples of changes: signs that wound is getting worse, color change of wound bed, change to surrounding skin, increase in size of wound, etc              Discussed findings and treatment plan including risks, benefits, and treatment options with patient in detail. Patient agreed with treatment plan.      This document has been electronically signed by GAEL Mccormick on 3/7/2022 11:57 CST

## 2022-03-07 NOTE — PROGRESS NOTES
[facility] Medicine Services  INPATIENT PROGRESS NOTE    Patient Name: Ena Upton  Date of Admission: 3/5/2022  Today's Date: 03/07/22  Length of Stay: 2  Primary Care Physician: Yaron Wiggins APRN    Subjective   Chief Complaint: Weakness.   HPI   She was able to extubate yesterday.    Speech therapy working on clearing her for a diet this morning.    Potassium level has come down after medical treatment yesterday afternoon by Dr. Prado.  She last had dialysis on Saturday.  Her typical dialysis days are Tuesday, Thursday, and Saturday.    She does not remember the events that led to her hospitalization.  She is not a reliable historian at this point in time.    She has been intermittently hypoglycemic since coming off of the insulin drip yesterday morning.  She is actually now on a low rate of D10.  I would like to transition this to D5 until she eats.    Review of Systems   All pertinent negatives and positives are as above. All other systems have been reviewed and are negative unless otherwise stated.     Objective    Temp:  [98.4 °F (36.9 °C)-100.1 °F (37.8 °C)] 98.4 °F (36.9 °C)  Heart Rate:  [68-92] 83  Resp:  [8-19] 12  BP: (112-178)/() 157/81  FiO2 (%):  [30 %] 30 %  Physical Exam  Constitutional:       Appearance: She is well-developed. She is ill-appearing (chronically).      Comments: Up in bed.  No distress.  No family present.  Discussed with her nurse, Georgette.    HENT:      Head: Normocephalic and atraumatic.   Eyes:      Conjunctiva/sclera: Conjunctivae normal.      Pupils: Pupils are equal, round, and reactive to light.   Neck:      Vascular: No JVD.   Cardiovascular:      Rate and Rhythm: Normal rate and regular rhythm.      Heart sounds: Normal heart sounds.      Comments: Sinus rhythm in the 80s.  Pulmonary:      Effort: Pulmonary effort is normal. No respiratory distress.      Breath sounds: Normal breath sounds.      Comments: On room air.    Chest:      Chest wall: No tenderness.   Abdominal:      General: Bowel sounds are normal. There is no distension.      Palpations: Abdomen is soft.      Tenderness: There is no abdominal tenderness.   Musculoskeletal:         General: No tenderness or deformity. Normal range of motion.      Cervical back: Neck supple.   Skin:     General: Skin is warm and dry.      Findings: No rash.   Neurological:      General: No focal deficit present.      Mental Status: She is alert and oriented to person, place, and time.      Cranial Nerves: No cranial nerve deficit.      Motor: Weakness present. No abnormal muscle tone.      Deep Tendon Reflexes: Reflexes normal.   Psychiatric:      Comments: Flat affect.        Results Review:  I have reviewed the labs, radiology results, and diagnostic studies.    Laboratory Data:   Results from last 7 days   Lab Units 03/07/22  0426 03/05/22  1237 03/05/22  1010   WBC 10*3/mm3 7.43 8.12 7.17   HEMOGLOBIN g/dL 8.3* 9.1* 10.7*   HEMATOCRIT % 26.3* 28.7* 33.7*   PLATELETS 10*3/mm3 156 151 193     Results from last 7 days   Lab Units 03/07/22  0426 03/06/22  1840 03/06/22  1357 03/06/22  0420 03/06/22  0024 03/05/22  1237 03/05/22  1042   SODIUM mmol/L 128* 130* 128*   < > 132*   < > 119*   POTASSIUM mmol/L 5.1 4.6 6.6*   < > 3.2*   < > 4.3   CHLORIDE mmol/L 93* 94* 95*   < > 96*   < > 82*   CO2 mmol/L 22.0 23.0 20.0*   < > 25.0   < > 21.0*   BUN mg/dL 36* 33* 33*   < > 28*   < > 49*   CREATININE mg/dL 4.01* 3.63* 3.52*   < > 3.09*   < > 4.66*   CALCIUM mg/dL 8.1* 8.6 8.2*   < > 8.2*   < > 8.4*   BILIRUBIN mg/dL 0.4  --   --   --  0.3  --  0.4   ALK PHOS U/L 103  --   --   --  85  --  138*   ALT (SGPT) U/L 13  --   --   --  15  --  23   AST (SGOT) U/L 23  --   --   --  17  --  38*   GLUCOSE mg/dL 117* 95 144*   < > 188*   < > 972*    < > = values in this interval not displayed.     Culture Data:   Blood Culture   Date Value Ref Range Status   03/05/2022 Staphylococcus, coagulase negative  (C)  Preliminary   03/05/2022 No growth at 24 hours  Preliminary     Urine Culture   Date Value Ref Range Status   03/05/2022 50,000 CFU/mL Staphylococcus, coagulase negative (A)  Final     Comment:     By laboratory criteria, addititional testing is not indicated and unlikely to produce clinically relevant information.  Coagulase negative staphylococci are common skin contaminants, members of the oral lilly,  and of low virulence; requires clinical correlation.       I have reviewed the patient's current medications.     Assessment/Plan     Active Hospital Problems    Diagnosis    • **Metabolic encephalopathy    • HHNC (hyperglycemic hyperosmolar nonketotic coma) (HCC)    • Uncontrolled type 2 diabetes mellitus with hyperglycemia (HCC)    • Hypertensive emergency    • ESRD on hemodialysis (HCC)    • Hyponatremia    • Hyperkalemia    • Acute respiratory failure secondary to an inability to protect her airway    • Tobacco abuse    • Anemia, chronic disease    • Elevated troponin due to ESRD and hypertensive urgency    • Hypothyroidism      Plan:  The patient was admitted on 3/5 by Dr. Prado.  She was found unresponsive.  EMS arrived on the scene and placed an LMA.  She was ultimately intubated in the emergency department.  She had a blood sugar of greater than 900 on arrival and was also severely hypertensive.  She has end-stage renal disease and does dialysis on Tuesday, Thursday, and Saturday.    She required ventilatory support in the emergency department secondary to altered mental status and inability to protect her airway.  She was extubated on Sunday, 3/6.  She has been evaluated by pulmonary.  She is currently on room air.    Nephrology consulted to assist with dialysis.  She received dialysis on Saturday in the intensive care unit setting.  She required a medical treatment for hyperkalemia on the afternoon of 3/6 and also received a dose of Lokelma.  Potassium level 5.1 today.    She has significant  problems with hypertension and is on multiple medications for this.  Dr. Prado had not been able to resume all of them.  Most recent blood pressure was 158/68 and nursing has her on Cardene.  Will resume Imdur, Procardia, and push her carvedilol and hydralazine back to her typical doses this morning.  Lisinopril and spironolactone on hold secondary to recent problems with hyperkalemia.  These should be able to be resumed soon as well if needed.     She had 1 bottle positive for Staphylococcus that was coagulase-negative.  Stop vancomycin. Consider this to be a contaminant.    She was initially on an insulin drip.  This was removed on 3/6.  She was reordered long-acting insulin and sliding scale insulin.  However, she has had some problems with hypoglycemia and was recently placed on dextrose fluids. Her most recent hemoglobin A1c was 12.4 in January 2022.    Offered a nicotine patch and counseled for tobacco cessation.    Reconciled and resumed other home medications as appropriate.    Speech therapy to consult.    Pepcid for peptic ulcer prophylaxis.    Lovenox for DVT prophylaxis.    Consider transfer to the floor later this morning.    Electronically signed by Thomas Oliver DO, 03/07/22, 08:30 CST.

## 2022-03-07 NOTE — PLAN OF CARE
Goal Outcome Evaluation:  Plan of Care Reviewed With: patient, caregiver (KIRSTIN Palomino)        Progress: no change (Initial Evaluation)       Clinical bedside swallow evaluation completed. The patient was lethargic and cooperative. Initially would not speak or open eyes. As evaluation progressed the patient became more conversational. Unable to follow commands initially but at end of evaluation was able to.     Primary problem: Metabolic encephalopathy, s/p extubation.    Oral motor assessment WFL. The patient completed a full range of consistencies except for mechanical soft. No overt s/s of aspiration observed. Functional rotary chew given regular solids.     SLP recommends:   1) Regular diet   2) Thin liquids   3) Medications as tolerated  4) Oral care, standard swallow precautions, ensure proper alertness prior to giving PO    SLP will sign off of swallow as no impairment present which warrants skilled intervention. Will follow up for cognitive evaluation if not back to baseline.     Amy Guzman MS CCC-SLP 3/7/2022 10:11 CST

## 2022-03-07 NOTE — CASE MANAGEMENT/SOCIAL WORK
Discharge Planning Assessment  [facility]     Patient Name: Ena Upton  MRN: [id number]  Today's Date: 3/7/2022    Admit Date: 3/5/2022     Discharge Needs Assessment     Row Name 03/07/22 0940       Living Environment    People in Home significant other    Name(s) of People in Home Dmitriy    Current Living Arrangements home    Potentially Unsafe Housing Conditions aggressive pet(s)    Primary Care Provided by spouse/significant other    Provides Primary Care For no one    Family Caregiver if Needed significant other    Family Caregiver Names Dmitriy    Able to Return to Prior Arrangements yes       Resource/Environmental Concerns    Resource/Environmental Concerns none    Transportation Concerns no car       Transition Planning    Patient/Family Anticipates Transition to home with family    Patient/Family Anticipated Services at Transition none    Transportation Anticipated family or friend will provide       Discharge Needs Assessment    Readmission Within the Last 30 Days no previous admission in last 30 days    Equipment Currently Used at Home walker, rolling;wheelchair;commode    Concerns to be Addressed discharge planning    Equipment Needed After Discharge none    Discharge Facility/Level of Care Needs home with home health    Discharge Coordination/Progress spoke to sister, Pat; patient lives with significant other Dmitriy; has RX coverage, needed DME; dialysis Tues Thurs and Sat with [facility] transportaion and PCP; patient needs hospital bed and signed up for [facility] when she is more alert; sister lives nearby but is unable to assist in care of patient due to 2 aggressive dogs in the home;  will follow for DC needs               Discharge Plan    No documentation.               Continued Care and Services - Admitted Since 3/5/2022    Coordination has not been started for this encounter.          Demographic Summary    No documentation.                Functional Status    No documentation.                Psychosocial     No documentation.                Abuse/Neglect    No documentation.                Legal    No documentation.                Substance Abuse    No documentation.                Patient Forms    No documentation.                   Priya Miller RN

## 2022-03-07 NOTE — CONSULTS
Pt awake but remains poor historian at this point.  Admitted after being found down at home.  Extubated 3/6. Alone in room at this time.  Will defer Education for now.  Will cont to follow.

## 2022-03-07 NOTE — THERAPY EVALUATION
Acute Care - Speech Language Pathology   Swallow Initial Evaluation [facility]     Patient Name: Ena Upton  : 1970  MRN: [id number]  Today's Date: 3/7/2022               Admit Date: 3/5/2022  Clinical bedside swallow evaluation completed. The patient was lethargic and cooperative. Initially would not speak or open eyes. As evaluation progressed the patient became more conversational. Unable to follow commands initially but at end of evaluation was able to.     Primary problem: Metabolic encephalopathy, s/p extubation.    Oral motor assessment WFL. The patient completed a full range of consistencies except for mechanical soft. No overt s/s of aspiration observed. Functional rotary chew given regular solids.     SLP recommends:   1) Regular diet   2) Thin liquids   3) Medications as tolerated  4) Oral care, standard swallow precautions, ensure proper alertness prior to giving PO    SLP will sign off of swallow as no impairment present which warrants skilled intervention. Will follow up for cognitive evaluation if not back to baseline.   Amy Guzman MS CCC-SLP 3/7/2022 10:14 CST    Visit Dx:     ICD-10-CM ICD-9-CM   1. Altered mental status, unspecified altered mental status type  R41.82 780.97   2. 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)   • Non-ketotic hyperosmolar coma (HCC)   • HTN (hypertension)   • Intractable nausea and vomiting   • Constipation   • Gastroparesis due to DM ([facility])   • Hyperglycemia   • Seizure ([facility])   • ESRD on hemodialysis ([facility])   • Sacral insufficiency fracture   • Hypertensive urgency   • Uncontrolled type 2 diabetes mellitus with hyperglycemia ([facility])   • Hyperkalemia   • Metabolic encephalopathy   • Hypertensive emergency   • Acute pulmonary edema ([facility])   • Hypothyroidism   • HHNC (hyperglycemic hyperosmolar nonketotic coma) ([facility])   • Uremic encephalopathy syndrome   •  Hyponatremia   • Elevated troponin due to ESRD and hypertensive urgency   • Acute respiratory failure secondary to an inability to protect her airway   • Tobacco abuse   • Anemia, chronic disease     Past Medical History:   Diagnosis Date   • Anxiety    • Arthritis    • Chronic kidney disease     STAGE V RENAL FAILURE   • Depression    • Diabetes mellitus (HCC)    • HTN (hypertension)    • Hypothyroidism    • Obesity    • Tremors of nervous system      Past Surgical History:   Procedure Laterality Date   • ARTERIOVENOUS FISTULA     • BREAST BIOPSY     • CARPAL TUNNEL RELEASE     • CATH LAB PROCEDURE     • CHOLECYSTECTOMY     • TUBAL ABDOMINAL LIGATION         SLP Recommendation and Plan  SLP Swallowing Diagnosis: swallow WFL (03/07/22 0845)  SLP Diet Recommendation: regular textures, thin liquids (03/07/22 0845)  Recommended Precautions and Strategies: upright posture during/after eating, small bites of food and sips of liquid, general aspiration precautions, fatigue precautions (03/07/22 0845)  SLP Rec. for Method of Medication Administration: meds whole, as tolerated (03/07/22 0845)     Monitor for Signs of Aspiration: yes, notify SLP if any concerns (03/07/22 0845)  Recommended Diagnostics: SLE/Cog/Motor Speech Evaluation (03/07/22 0845)  Swallow Criteria for Skilled Therapeutic Interventions Met: no problems identified which require skilled intervention (03/07/22 0845)  Anticipated Discharge Disposition (SLP): unknown (03/07/22 0845)     Therapy Frequency (Swallow): evaluation only (03/07/22 0845)                                     Plan of Care Reviewed With: patient, caregiver (KIRSTIN Palomino)  Progress: no change (Initial Evaluation)      SWALLOW EVALUATION (last 72 hours)     SLP Adult Swallow Evaluation     Row Name 03/07/22 0845                   Rehab Evaluation    Document Type evaluation  -MM        Subjective Information no complaints  -MM        Patient Observations lethargic;cooperative;agree to therapy   -MM        Patient/Family/Caregiver Comments/Observations No family present.  -MM        Patient Effort adequate  -MM        Symptoms Noted During/After Treatment fatigue  -MM                  General Information    Patient Profile Reviewed yes  -MM        Pertinent History Of Current Problem Primary problem: Metabolic encephalopathy, s/p extubation.  -MM        Current Method of Nutrition NPO  -MM        Precautions/Limitations, Vision other (see comments)  eyes remained closed  -MM        Precautions/Limitations, Hearing WFL;for purposes of eval  -MM        Prior Level of Function-Communication unknown  -MM        Prior Level of Function-Swallowing unknown  -MM        Plans/Goals Discussed with patient;other (see comments);agreed upon  RN and MD  -MM        Barriers to Rehab cognitive status  -MM        Patient's Goals for Discharge patient did not state  -MM                  Pain    Additional Documentation Pain Scale: FACES Pre/Post-Treatment (Group)  -MM                  Pain Scale: FACES Pre/Post-Treatment    Pain: FACES Scale, Pretreatment 2-->hurts little bit  -MM        Posttreatment Pain Rating 2-->hurts little bit  -MM                  Oral Motor Structure and Function    Dentition Assessment missing teeth;poor oral hygiene  -MM        Secretion Management WNL/WFL  -MM        Mucosal Quality moist, healthy  -MM                  Oral Musculature and Cranial Nerve Assessment    Oral Motor General Assessment WFL  -MM                  General Eating/Swallowing Observations    Eating/Swallowing Skills fed by SLP  -MM        Positioning During Eating upright in bed  -MM        Utensils Used spoon;straw  -MM        Consistencies Trialed pureed;honey-thick liquids;nectar/syrup-thick liquids;thin liquids;regular textures  -MM                  Clinical Swallow Eval    Oral Prep Phase WFL  -MM        Oral Transit WFL  -MM        Oral Residue WFL  -MM        Pharyngeal Phase no overt signs/symptoms of pharyngeal  impairment  -MM        Esophageal Phase unremarkable  -MM        Clinical Swallow Evaluation Summary See note  -MM                  SLP Evaluation Clinical Impression    SLP Swallowing Diagnosis swallow WFL  -MM        Functional Impact no impact on function  -MM        Swallow Criteria for Skilled Therapeutic Interventions Met no problems identified which require skilled intervention  -MM                  Recommendations    Therapy Frequency (Swallow) evaluation only  -MM        SLP Diet Recommendation regular textures;thin liquids  -MM        Recommended Diagnostics SLE/Cog/Motor Speech Evaluation  -MM        Recommended Precautions and Strategies upright posture during/after eating;small bites of food and sips of liquid;general aspiration precautions;fatigue precautions  -MM        Oral Care Recommendations Oral Care BID/PRN;Toothbrush  -MM        SLP Rec. for Method of Medication Administration meds whole;as tolerated  -MM        Monitor for Signs of Aspiration yes;notify SLP if any concerns  -MM        Anticipated Discharge Disposition (SLP) unknown  -MM              User Key  (r) = Recorded By, (t) = Taken By, (c) = Cosigned By    Initials Name Effective Dates    MM Amy Guzman, MS CCC-SLP 06/16/21 -                 EDUCATION  The patient has been educated in the following areas:   Dysphagia (Swallowing Impairment) Oral Care/Hydration.              Time Calculation:    Time Calculation- SLP     Row Name 03/07/22 1012             Time Calculation- SLP    SLP Start Time 0845  -MM      SLP Stop Time 0925  -MM      SLP Time Calculation (min) 40 min  -MM      SLP Received On 03/07/22  -MM      SLP Goal Re-Cert Due Date 03/17/22  -MM              Untimed Charges    SLP Eval/Re-eval  ST Eval Oral Pharyng Swallow - [id number]  -MM      [id number] Eval Oral Pharyng Swallow Minutes 40  -MM              Total Minutes    Untimed Charges Total Minutes 40  -MM       Total Minutes 40  -MM            User Key  (r) = Recorded  By, (t) = Taken By, (c) = Cosigned By    Initials Name Provider Type    Amy [place], MS CCC-SLP Speech and Language Pathologist                Therapy Charges for Today     Code Description Service Date Service Provider Modifiers Qty    [id number] HC ST EVAL ORAL PHARYNG SWALLOW 3 3/7/2022 Amy Guzman, MS CCC-SLP GN 1               Amy Guzman MS CCC-SLP  3/7/2022

## 2022-03-07 NOTE — PAYOR COMM NOTE
"[facility]  DANIEL,   [phone]  OR  FAX  [phone]      Chemo Upton (52 y.o. Female)             Date of Birth   1970    Social Security Number       Address   [address] [place] [address]    Home Phone   [phone]    MRN   [id number]       Rastafarian   [facility]    Marital Status                               Admission Date   3/5/22    Admission Type   Emergency    Admitting Provider   Thomas Oliver DO    Attending Provider   Thomas Oliver DO    Department, Room/Bed   [facility] INTENSIVE CARE, I003/1       Discharge Date       Discharge Disposition       Discharge Destination                               Attending Provider: Thomas Oliver DO    Allergies: Penicillins, Lamisil [Terbinafine], Reglan [Metoclopramide], Stadol [Butorphanol]    Isolation: None   Infection: None   Code Status: Prior   Advance Care Planning Activity    Ht: 157.5 cm (62\")   Wt: 76.3 kg (168 lb 3.4 oz)    Admission Cmt: None   Principal Problem: None                Active Insurance as of 3/5/2022     Primary Coverage     Payor Plan Insurance Group Employer/Plan Group    [facility] MEDICARE REPLACEMENT [employer] MEDICARE REPLACEMENT      Payor Plan Address Payor Plan Phone Number Payor Plan Fax Number Effective Dates    [address] [address] [phone]  6/1/2021 - None Entered    [facility] [id number]       Subscriber Name Subscriber Birth Date Member ID       CHEMO UPTON 1970 [id number]                 Emergency Contacts      (Rel.) Home Phone Work Phone Mobile Phone    Kassandra Paez (Sister) [phone] -- [phone]    Dmitriy Galvez (Friend) -- -- [phone]        09:57 HPI HPI (Adult)  Stated Reason for Visit: PATIENT FOUND ON THE FLOOR THIS MORNING UNRESPONSIVE BY SPOUSE. EMS ALERTED. IGEL AND IO PLACED PER EMS. MINIMAL RESPONSE DURING TRANSPORT FROM PATIENT. Delia Kelly, RN       09:57:18 Chief Complaints Updated Loss of Consciousness   " "Robin, Delia R, RN   10:00 Intraosseous Line 22 Tibia Placed Removal Date/Time: 22 1417  Placement Date/Time: 22 1000   Placed by External Staff?: EMS  Orientation: Left  Location: Tibia  Removed : Per protocol Delia Kelly RN   10:02 Medication Given etomidate (AMIDATE) injection 30 mg - Dose: 30 mg ; Route: Intravenous ; Line: Intraosseous Line 22 Tibia ; Scheduled Time: 1005 Delia Kelly RN   10:02 Medication Given rocuronium (ZEMURON) injection 100 mg - Dose: 100 mg ; Route: Intravenous ; Line: Intraosseous Line 22 Tibia ; Scheduled Time: 1005 Delia Kelly RN   10:03 ETT  Placed Removal Date/Time: 22 0853  Placement Date/Time: 22 1003   Technique: Video laryngoscopy  Type: Cuffed  Tube Size: 7.5 mm  Placement Verification: Auscultation;Chest x-ray;End tidal CO2 Delia Kelly RN   10:03:20 ETT  Assessment Secured at (cm): 23  Measured from: Lips/ Gum line Delia Kelly RN   10:03:30 ED Quick Updates Quick Updates  Quick Updates - Free Text: PATIENT INTUBATED BY Delia Mcdonough RN   10:03:31 Orders Placed Medications  - etomidate (AMIDATE) injection 30 mg; rocuronium (ZEMURON) injection 100 mg Arthur Huff MD   10:03:33 ED Medication Ordered  ROCURONIUM BROMIDE 50 MG/5ML IV SOLN, ETOMIDATE 2 MG/ML IV SOLN Delia Kelly RN   10:04:31 Orders Placed Medications  - propofol (DIPRIVAN) infusion 10 mg/mL 100 mL Heath Prado MD   10:04:34 ED Medication Ordered  PROPOFOL INFUSION 10 MG/ML WRAPPER Delia Kelly R     10:07 Vital Signs Vital Signs  Heart Rate: 100  Resp: 16 (ASSISTED WITH BVM)  BP: 257/120 Abnormal   BMI (Calculated): 32.5  Oxygen Therapy  SpO2: 100 % (BVM)  Device (Oxygen Therapy): other (see comments)  Vitals Timer  Restart Vitals Timer: Yes  Height and Weight  Height: 157.5 cm (62\")  Weight: 80.7 kg (178 lb)  Other flowsheet entries  Ideal Body Weight k.1 Delia Kelly " ARIE     10:39:02 Neuro Cognitive Neuro Cognitive (Adult)  Cognitive/Neuro/Behavioral WDL: all  Level of Consciousness: Unresponsive  Arousal Level: unresponsive  Orientation: disoriented x 4  Speech: endotracheal tube  Additional Documentation: Milena Coma Scale (Group)  Milena Coma Scale  Best Eye Response: 1-->(E1) none  Best Motor Response: 1-->(M1) none  Best Verbal Response: 1-->(V1) none  [facility] Coma Scale Score: 3  Sedation Group  RASS (Mercedes Agitation-Sedation Scale): -3-->moderate sedation Delia Kelly RN     11:07 Medication New Bag cefTRIAXone (ROCEPHIN) 1 g/100 mL 0.9% NS (MBP) - Dose: 1 g ; Route: Intravenous ; Line: Peripheral IV 03/05/22 1107 Right;Posterior Forearm ; Scheduled Time: 1050 Delia Kelly RN   11:07:44 Peripheral IV 03/05/22 1107 Right;Posterior Forearm Assessment Phlebitis: 0-->no symptoms  Site Assessment: Clean; Dry; Intact  Line Status: Blood return noted; Flushed  Dressing Type: Transparent Delia Kelly RN     11:23 Medication Given aspirin suppository 300 mg - Dose: 300 mg ; Route: Rectal ; Scheduled Time: 1111 Delia Kelly RN   11:23:07 BNP Resulted Abnormal Result   Collected: 3/5/2022 10:42   Last updated: 3/5/2022 11:23   Status: Final result   proBNP: >70,000.0 pg/mL High  [Ref Range: 0.0 - 900.0]  Alejandra Weller     11:24 Medication Rate/Dose Change niCARdipine (CARDENE) 25 mg in 250 mL NS infusion - Dose: 10 mg/hr ; Rate: 100 mL/hr ; Route: Intravenous ; Line: Peripheral IV 03/05/22 1022 Right Antecubital ; Scheduled Time: 1124 Delia Kelly RN   11:24:13 Vital Signs Vital Signs  Heart Rate: 96  Resp: 20  BP: 201/87 Abnormal   Oxygen Therapy  SpO2: 99 %  Device (Oxygen Therapy): ventilator  Vitals Timer  Restart Vitals Timer: Yes Delia Kelly RN     11:24 Medication Rate/Dose Change niCARdipine (CARDENE) 25 mg in 250 mL NS infusion - Dose: 10 mg/hr ; Rate: 100 mL/hr ; Route: Intravenous ; Line: Peripheral IV 03/05/22 1022 Right  Antecubital ; Scheduled Time: 1124 Delia Kelly RN   11:24:13 Vital Signs Vital Signs  Heart Rate: 96  Resp: 20  BP: 201/87 Abnormal   Oxygen Therapy  SpO2: 99 %  Device (Oxygen Therapy): ventilator  Vitals Timer  Restart Vitals Timer: Yes Delia Kelly RN     11:27 Medication Rate/Dose Change propofol (DIPRIVAN) infusion 10 mg/mL 100 mL - Dose: 15 mcg/kg/min ; Rate: 7.26 mL/hr ; Route: Intravenous ; Line: Peripheral IV 03/05/22 1005 Right Forearm ; Scheduled Time: 1127 Delia Kelly RN   11:27 Data C= Choice of Analgesia and Sedation  RASS (Mercedes Agitation-Sedation Scale): -2-->light sedation Delia Kelly RN     11:33 Medication Given insulin regular (humuLIN R,novoLIN R) injection 5 Units - Dose: 5 Units ; Route: Intravenous ; Line: Peripheral IV 03/05/22 1107 Right;Posterior Forearm ; Scheduled Time: 1130 Delia Kelly RN     11:38 Medication Rate/Dose Change propofol (DIPRIVAN) infusion 10 mg/mL 100 mL - Dose: 20 mcg/kg/min ; Rate: 9.68 mL/hr ; Route: Intravenous ; Line: Peripheral IV 03/05/22 1005 Right Forearm ; Scheduled Time: 1138 Delia Kelly RN     12:01 Medication Rate/Dose Change propofol (DIPRIVAN) infusion 10 mg/mL 100 mL - Dose: 30 mcg/kg/min ; Rate: 14.53 mL/hr ; Route: Intravenous ; Line: Peripheral IV 03/05/22 1005 Right Forearm ; Scheduled Time: 1201 Dayami Mansfield RN Manley, Brandy, RN    Registered Nurse   Cardiology   Plan of Care      Signed   Date of Service:  03/05/22 1932   Creation Time:  03/05/22 1932              Signed            Goal Outcome Evaluation:   Admitted to ICU.  HD as documented.  Titrated and removed Cardene gtt.  Restraints as documented. Insulin drip as documented.  Labs ordered and completed.  Fluids ordered as documented.   did not come to bedside.  No family here.  PRN medications given as documented.  Vital signs stable.                      Vandana Rowland, RN    Registered Nurse   Cardiology   Nursing Note       Signed   Date of Service:  03/06/22 0300   Creation Time:  03/06/22 0300              Signed        Summary: insulin      Spoke with patients glucose trending down anion gap closed will stop insullin drip 45 minutes after dose of humalog given per orders                          History & Physical      Heath Prado MD at 03/05/22 1225              [facility]  HISTORY AND PHYSICAL    Date of Admission: 3/5/2022  Primary Care Physician: Yaron Wiggins APRN    Subjective     Chief Complaint: Found unresponsive    History of Present Illness  Patient is a 52-year-old  female with past medical history significant for end-stage renal disease requiring hemodialysis, insulin-dependent diabetes, hypertension on multiple outpatient antihypertensives, the presented to our hospital via EMS after being found unresponsive.  Patient is currently intubated and sedated on propofol.  I am unable to get any additional information directly from the patient in this clinical circumstance.  Per the emergency room provider patient was found in her home today unresponsive.  It was reported that her last known well was last night.  When EMS arrived they checked her blood glucose and it was found too high to read.  An LMA was placed by EMS, and patient was brought to our facility for further work-up and management.  She was found to have a blood sugar greater than 900 on arrival.  It sounds like that she is due to get dialysis today, and evidently is on a Tuesday, Thursday, Saturday dialysis regimen.  I do not know if she has missed any dialysis sessions leading up to this hospitalization.  It was also reported that she had some blood in her mouth when EMS arrived, concern for tongue laceration.  She has no previous history of seizure disorder.  Upon arrival patient was found to have a GCS of 3.  She is just arrived to the intensive care unit and nursing staff does  report that she was awake and able to follow commands shortly after arrival.  Currently she is on sedation with propofol, and has also been started on a Cardene drip.  We just recheck patient's blood sugar upon arrival to the intensive care unit and again remains too high to read.  It looks like 5 units of regular insulin has been administered x1 so far.  Blood pressure trend on arrival as follows:      Review of Systems     Otherwise complete ROS reviewed and negative except as mentioned in the HPI.    Past Medical History:   Past Medical History:   Diagnosis Date   • Anxiety    • Arthritis    • Chronic kidney disease     STAGE V RENAL FAILURE   • Depression    • Diabetes mellitus (HCC)    • HTN (hypertension)    • Hypothyroidism    • Obesity    • Tremors of nervous system      Past Surgical History:  Past Surgical History:   Procedure Laterality Date   • ARTERIOVENOUS FISTULA     • BREAST BIOPSY     • CARPAL TUNNEL RELEASE     • CATH LAB PROCEDURE     • CHOLECYSTECTOMY     • TUBAL ABDOMINAL LIGATION       Social History:  reports that she has been smoking. She has been smoking about 0.50 packs per day. She has never used smokeless tobacco. She reports that she does not drink alcohol and does not use drugs.    Family History: family history includes Cancer in an other family member; Diabetes in her maternal aunt, maternal grandmother, mother, and another family member; Heart disease in an other family member; Hypertension in an other family member; Kidney disease in an other family member.       Allergies:  Allergies   Allergen Reactions   • Penicillins Hives and Shortness Of Breath   • Lamisil [Terbinafine]    • Reglan [Metoclopramide] GI Intolerance   • Stadol [Butorphanol] Nausea And Vomiting       Medications:  Prior to Admission medications    Medication Sig Start Date End Date Taking? Authorizing Provider   acetaminophen (TYLENOL) 325 MG tablet Take 2 tablets by mouth Every 6 (Six) Hours As Needed for Mild  Pain . 10/12/21   Harrison Moya MD   albuterol (PROVENTIL HFA;VENTOLIN HFA) 108 (90 BASE) MCG/ACT inhaler Inhale 2 puffs Every 4 (Four) Hours As Needed for wheezing.    Blanche Neil MD   carvedilol (COREG) 25 MG tablet Take 1 tablet by mouth 2 (Two) Times a Day With Meals. 9/18/21   Alejandra Kruse APRN   cloNIDine (CATAPRES) 0.3 MG tablet Take 1 tablet by mouth Every 8 (Eight) Hours. 9/18/21   Alejandra Kruse APRN   DULoxetine (CYMBALTA) 60 MG capsule Take 60 mg by mouth daily.    Blanche Neil MD   famotidine (PEPCID) 20 MG tablet Take 20 mg by mouth 2 (Two) Times a Day.    Blanche Neil MD   hydrALAZINE (APRESOLINE) 50 MG tablet Take 1.5 tablets by mouth 3 (Three) Times a Day. 9/18/21   Alejandra Kruse APRN   insulin aspart (novoLOG) 100 UNIT/ML injection Inject 10 Units under the skin into the appropriate area as directed 3 (Three) Times a Day Before Meals.    Blanche Neil MD   Insulin Glargine (BASAGLAR KWIKPEN) 100 UNIT/ML injection pen Inject 40 Units under the skin into the appropriate area as directed Every Night.    Blanche Neil MD   isosorbide mononitrate (IMDUR) 60 MG 24 hr tablet Take 1 tablet by mouth Daily. 10/13/21   Harrison Moya MD   levothyroxine (SYNTHROID, LEVOTHROID) 50 MCG tablet Take 1 tablet by mouth Daily. 9/18/21   Alejandra Kruse APRN   lisinopril (PRINIVIL,ZESTRIL) 20 MG tablet Take 20 mg by mouth 2 (Two) Times a Day.    Blanche Neil MD   nicotine (NICODERM CQ) 21 MG/24HR patch Place 1 patch on the skin as directed by provider Daily.    Blanche Neil MD   NIFEdipine XL (PROCARDIA XL) 60 MG 24 hr tablet Take 60 mg by mouth 2 (two) times a day.    Blanche Neil MD   rOPINIRole (REQUIP) 4 MG tablet Take 4 mg by mouth Every Night.    Blanche Neil MD   simvastatin (ZOCOR) 20 MG tablet Take 40 mg by mouth Daily.    Provider, Historical, MD   spironolactone (ALDACTONE) 25 MG  "tablet Take 1 tablet by mouth Daily. 10/13/21   Harrison Moya MD     I have utilized all available immediate resources to obtain, update, and review the patient's current medications.    Objective     Vital Signs: /96   Pulse 97   Temp 97.8 °F (36.6 °C) (Axillary)   Resp 18   Ht 157.5 cm (62\")   Wt 75.5 kg (166 lb 7.2 oz)   SpO2 100%   BMI 30.44 kg/m²   Physical Exam  Vitals reviewed.   Constitutional:       Appearance: She is ill-appearing.   HENT:      Head: Normocephalic.      Mouth/Throat:      Pharynx: No oropharyngeal exudate (ET tube in place).      Comments: Blood stain noted in the mouth.  Poor dentition is present.  Unable to get good visualization of the tongue.  There is evidence of small bleeding area lower lip  Eyes:      Pupils: Pupils are equal, round, and reactive to light.   Cardiovascular:      Rate and Rhythm: Normal rate.   Pulmonary:      Effort: Pulmonary effort is normal.      Breath sounds: No wheezing or rales.      Comments: Currently on mechanical ventilation at 50% FiO2 and 5 of PEEP  Abdominal:      Palpations: Abdomen is soft.   Musculoskeletal:         General: No deformity.      Cervical back: Neck supple.   Skin:     Comments: Left upper extremity AV fistula; peripheral IVs in place on the right upper extremity   Neurological:      Comments: Intubated and sedated   Psychiatric:      Comments: Intubated and sedated              Results Reviewed:  Lab Results (last 24 hours)     Procedure Component Value Units Date/Time    Hemoglobin A1c [[id number]]  (Abnormal) Collected: 03/05/22 1010    Specimen: Blood Updated: 03/05/22 1230     Hemoglobin A1C 9.70 %     Narrative:      Hemoglobin A1C Ranges:    Increased Risk for Diabetes  5.7% to 6.4%  Diabetes                     >= 6.5%  Diabetic Goal                < 7.0%    Comprehensive Metabolic Panel [[id number]]  (Abnormal) Collected: 03/05/22 1042    Specimen: Blood from Arm, Right Updated: 03/05/22 1125     " "Glucose 972 mg/dL      BUN 49 mg/dL      Creatinine 4.66 mg/dL      Sodium 119 mmol/L      Potassium 4.3 mmol/L      Chloride 82 mmol/L      CO2 21.0 mmol/L      Calcium 8.4 mg/dL      Total Protein 6.7 g/dL      Albumin 3.50 g/dL      ALT (SGPT) 23 U/L      AST (SGOT) 38 U/L      Alkaline Phosphatase 138 U/L      Total Bilirubin 0.4 mg/dL      Globulin 3.2 gm/dL      A/G Ratio 1.1 g/dL      BUN/Creatinine Ratio 10.5     Anion Gap 16.0 mmol/L      eGFR 10.6 mL/min/1.73      Comment: <15 Indicative of kidney failure       Narrative:      GFR Normal >60  Chronic Kidney Disease <60  Kidney Failure <15      BNP [[id number]]  (Abnormal) Collected: 03/05/22 1042    Specimen: Blood from Arm, Right Updated: 03/05/22 1123     proBNP >70,000.0 pg/mL     Narrative:      Among patients with dyspnea, NT-proBNP is highly sensitive for the detection of acute congestive heart failure. In addition NT-proBNP of <300 pg/ml effectively rules out acute congestive heart failure with 99% negative predictive value.    Results may be falsely decreased if patient taking Biotin.      Procalcitonin [[id number]]  (Abnormal) Collected: 03/05/22 1042    Specimen: Blood from Arm, Right Updated: 03/05/22 1116     Procalcitonin 0.28 ng/mL     Narrative:      As a Marker for Sepsis (Non-Neonates):    1. <0.5 ng/mL represents a low risk of severe sepsis and/or septic shock.  2. >2 ng/mL represents a high risk of severe sepsis and/or septic shock.    As a Marker for Lower Respiratory Tract Infections that require antibiotic therapy:    PCT on Admission    Antibiotic Therapy       6-12 Hrs later    >0.5                Strongly Recommended  >0.25 - <0.5        Recommended   0.1 - 0.25          Discouraged              Remeasure/reassess PCT  <0.1                Strongly Discouraged     Remeasure/reassess PCT    As 28 day mortality risk marker: \"Change in Procalcitonin Result\" (>80% or <=80%) if Day 0 (or Day 1) and Day 4 values are available. Refer to " http://www.[facility]-pct-calculator.com    Change in PCT <=80%  A decrease of PCT levels below or equal to 80% defines a positive change in PCT test result representing a higher risk for 28-day all-cause mortality of patients diagnosed with severe sepsis for septic shock.    Change in PCT >80%  A decrease of PCT levels of more than 80% defines a negative change in PCT result representing a lower risk for 28-day all-cause mortality of patients diagnosed with severe sepsis or septic shock.       C-reactive Protein [[id number]]  (Abnormal) Collected: 03/05/22 1042    Specimen: Blood from Arm, Right Updated: 03/05/22 1111     C-Reactive Protein 8.03 mg/dL     Troponin [[id number]]  (Abnormal) Collected: 03/05/22 1042    Specimen: Blood from Arm, Right Updated: 03/05/22 1108     Troponin T 0.088 ng/mL     Narrative:      Troponin T Reference Range:  <= 0.03 ng/mL-   Negative for AMI  >0.03 ng/mL-     Abnormal for myocardial necrosis.  Clinicians would have to utilize clinical acumen, EKG, Troponin and serial changes to determine if it is an Acute Myocardial Infarction or myocardial injury due to an underlying chronic condition.       Results may be falsely decreased if patient taking Biotin.      COVID-19,[facility] [employer] IN-HOUSE,Nasal Swab No Transport Media 3-4 HR TAT - Swab, Nasal Cavity [[id number]]  (Normal) Collected: 03/05/22 1016    Specimen: Swab from Nasal Cavity Updated: 03/05/22 1103     COVID19 Not Detected    Narrative:      Fact sheet for providers: https://www.fda.gov/media/954781/download     Fact sheet for patients: https://www.fda.gov/media/978354/download    Test performed by PCR.    Consider negative results in combination with clinical observations, patient history, and epidemiological information.    Urinalysis, Microscopic Only - Urine, Catheter [[id number]]  (Abnormal) Collected: 03/05/22 1018    Specimen: Urine, Catheter Updated: 03/05/22 1048     RBC, UA 0-2 /HPF      WBC, UA 31-50 /HPF      Bacteria, UA 1+  /HPF      Squamous Epithelial Cells, UA 0-2 /HPF      Hyaline Casts, UA None Seen /LPF      WBC Clumps, UA Small/1+ /HPF      Methodology Manual Light Microscopy    Urinalysis With Culture If Indicated - Urine, Catheter [[id number]]  (Abnormal) Collected: 03/05/22 1018    Specimen: Urine, Catheter Updated: 03/05/22 1048     Color, UA Yellow     Appearance, UA Clear     pH, UA 6.5     Specific Gravity, UA 1.022     Glucose, UA >=1000 mg/dL (3+)     Ketones, UA Negative     Bilirubin, UA Negative     Blood, UA Small (1+)     Protein,  mg/dL (2+)     Leuk Esterase, UA Moderate (2+)     Nitrite, UA Negative     Urobilinogen, UA 0.2 E.U./dL    Urine Culture - Urine, Urine, Catheter [[id number]] Collected: 03/05/22 1018    Specimen: Urine, Catheter Updated: 03/05/22 1047    Blood Culture - Blood, Arm, Left [[id number]] Collected: 03/05/22 1010    Specimen: Blood from Arm, Left Updated: 03/05/22 1047    Blood Culture - Blood, Arm, Right [[id number]] Collected: 03/05/22 1022    Specimen: Blood from Arm, Right Updated: 03/05/22 1046    Lactic Acid, Plasma [[id number]]  (Normal) Collected: 03/05/22 1010    Specimen: Blood Updated: 03/05/22 1033     Lactate 1.7 mmol/L     Protime-INR [[id number]]  (Normal) Collected: 03/05/22 1010    Specimen: Blood Updated: 03/05/22 1028     Protime 13.0 Seconds      INR 1.02    aPTT [[id number]]  (Normal) Collected: 03/05/22 1010    Specimen: Blood Updated: 03/05/22 1028     PTT 31.4 seconds     Blood Gas, Arterial - [[id number]]  (Abnormal) Collected: 03/05/22 1024    Specimen: Arterial Blood Updated: 03/05/22 1027     Site Right Radial     Aaron's Test Positive     pH, Arterial 7.323 pH units      Comment: 84 Value below reference range        pCO2, Arterial 43.3 mm Hg      pO2, Arterial 256.0 mm Hg      Comment: 83 Value above reference range        HCO3, Arterial 22.4 mmol/L      Base Excess, Arterial -3.5 mmol/L      Comment: 84 Value below reference range        O2 Saturation, Arterial  >100.1 %      Comment: 93 Value above reportable range > 100.1        Temperature 37.0 C      Barometric Pressure for Blood Gas 752 mmHg      Modality Ventilator     FIO2 100 %      Ventilator Mode AC     Set Tidal Volume 500     Set Mech Resp Rate 18.0     PEEP 5.0     Collected by [id number]     Comment: Meter: [id number]     :  [id number]        pCO2, Temperature Corrected 43.3 mm Hg      pH, Temp Corrected 7.323 pH Units      pO2, Temperature Corrected 256 mm Hg     CBC & Differential [[id number]]  (Abnormal) Collected: 03/05/22 1010    Specimen: Blood Updated: 03/05/22 1019    Narrative:      The following orders were created for panel order CBC & Differential.  Procedure                               Abnormality         Status                     ---------                               -----------         ------                     CBC Auto Differential[[id number]]        Abnormal            Final result                 Please view results for these tests on the individual orders.    CBC Auto Differential [[id number]]  (Abnormal) Collected: 03/05/22 1010    Specimen: Blood Updated: 03/05/22 1019     WBC 7.17 10*3/mm3      RBC 3.68 10*6/mm3      Hemoglobin 10.7 g/dL      Hematocrit 33.7 %      MCV 91.6 fL      MCH 29.1 pg      MCHC 31.8 g/dL      RDW 17.4 %      RDW-SD 59.7 fl      MPV 11.0 fL      Platelets 193 10*3/mm3      Neutrophil % 77.4 %      Lymphocyte % 13.8 %      Monocyte % 4.7 %      Eosinophil % 3.1 %      Basophil % 0.7 %      Immature Grans % 0.3 %      Neutrophils, Absolute 5.55 10*3/mm3      Lymphocytes, Absolute 0.99 10*3/mm3      Monocytes, Absolute 0.34 10*3/mm3      Eosinophils, Absolute 0.22 10*3/mm3      Basophils, Absolute 0.05 10*3/mm3      Immature Grans, Absolute 0.02 10*3/mm3      nRBC 0.0 /100 WBC         Imaging Results (Last 24 Hours)     Procedure Component Value Units Date/Time    CT Cervical Spine Without Contrast [[id number]] Collected: 03/05/22 1047     Updated: 03/05/22  1054    Narrative:      EXAMINATION: CT CERVICAL SPINE WO CONTRAST- 3/5/2022 10:47 AM CST     HISTORY: Neck pain, acute, no red flags; R41.82-Altered mental status,  unspecified. Patient found on floor unresponsive. Unwitnessed fall.     DOSE: 362 mGycm (Automatic exposure control technique was implemented in  an effort to keep the radiation dose as low as possible without  compromising image quality)     REPORT: Spiral CT of the cervical spine was performed without contrast,  reconstructed coronal and sagittal images are also reviewed.     COMPARISON: There are no correlative imaging studies for comparison.     Sagittal images demonstrate straightening of the cervical curvature  without spondylolisthesis. There is moderate degenerative disc disease  at C6-7 with endplate spurring and mild disc space narrowing. No  fracture is identified. The patient is intubated and there is soft  tissue swelling in the hypopharynx. There is normal aeration of the  mastoid air cells. There is mild right-sided neuroforaminal narrowing at  C6-7 related to uncinate spurs. There is mild spinal stenosis at C6-7.  Soft tissue windows show no gross evidence of a traumatic cervical disc  herniation. The visualized upper lungs are clear. There is moderately  heavy calcified plaque within the carotid arteries at the bifurcation.  The       Impression:      No fracture, no acute osseous cervical spine abnormality.  Partial visualization of the endotracheal tube, with soft tissue  swelling in the hypopharynx, not unexpected. Moderate spondylosis at  C6-7, with mild spinal stenosis right-sided neuroforaminal narrowing..              This report was finalized on 03/05/2022 10:51 by Dr. Farhad Munoz MD.    CT Head Without Contrast [[id number]] Collected: 03/05/22 1044     Updated: 03/05/22 1049    Narrative:      EXAMINATION: CT HEAD WO CONTRAST- 3/5/2022 10:44 AM CST     HISTORY: Delirium; R41.82-Altered mental status,  unspecified.  Unresponsive.     DOSE: 625 mGycm (Automatic exposure control technique was implemented in  an effort to keep the radiation dose as low as possible without  compromising image quality)     REPORT: Spiral CT of the head was performed without contrast,  reconstructed coronal and sagittal images are also reviewed.     COMPARISON: CT head without contrast 10/5/2021.     No intracranial hemorrhage, mass or mass effect is identified. There is  mildly decreased attenuation in the periventricular white matter tracts  as before, compatible with mild chronic small vessel white matter  ischemic disease. The ventricles and basal cisterns are within normal  limits. Review of bone windows is negative for fractures. There soft  tissue swelling in the posterior nasopharynx which may be related to  recent endotracheal tube insertion.       Impression:      No acute intracranial abnormality. Mild chronic small vessel  white matter ischemic changes appear stable.     This report was finalized on 03/05/2022 10:46 by Dr. Farhad Munoz MD.    XR Chest 1 View [[id number]] Collected: 03/05/22 1035     Updated: 03/05/22 1040    Narrative:      EXAMINATION: XR CHEST 1 VW- 3/5/2022 10:35 AM CST     HISTORY: intubation; R41.82-Altered mental status, unspecified.     REPORT: A frontal view of the chest was obtained.     COMPARISON: Chest x-ray 10/4/2021.     The patient is intubated, the tip of the endotracheal tube appears in  satisfactory position, approximately 2.8 cm superior to the jessie.  There are interstitial infiltrates in the left midlung and lower lung  zones, with associated volume loss. The right lung is clear. There is  mild cardiomegaly, this may be exaggerated by technique and positioning.  The patient is on a backboard. There is a stent in the left subclavian  and axillary region as before. No pneumothorax or pleural effusion is  identified. The upper abdomen appears unremarkable.       Impression:       Satisfactory position of the endotracheal tube. Interstitial  infiltrates are seen throughout most of the left lung without  consolidation. There is mild cardiomegaly, without evidence of overt  CHF.  This report was finalized on 03/05/2022 10:37 by Dr. Farhad Munzo MD.        I have personally reviewed and interpreted the radiology studies and ECG obtained at time of admission.     Assessment / Plan     Assessment:   Active Hospital Problems    Diagnosis    • Acute encephalopathy    • Elevated troponin    • Hyponatremia    • HHNC (hyperglycemic hyperosmolar nonketotic coma) ([facility])    • Hypertensive emergency    • Hypothyroidism    • Altered mental status    • ESRD (end stage renal disease) ([facility])      Plan:   1.  Cardene IV drip  2.  Start insulin drip per protocol  3.  Serial BMP and electrolyte monitoring  4.  In the setting of patient with end-stage renal disease requiring dialysis I will significantly decrease the rate of fluid administration as posted on the insulin drip protocol  5.  Nephrology consultation; records indicate that patient is on a Tuesday, Thursday, and Saturday dialysis regimen.  It is unclear if she has missed any dialysis sessions recently and will need to further investigate this.  6.  Pulmonary consultation to assist with vent management  7.  Check echocardiogram  8.  Trend troponin  9.  Seizure precautions  10.  IV Rocephin  11.  Follow-up blood and urine cultures.  I am going to check a respiratory PCR panel given interstitial infiltrates seen on chest imaging, left lung greater than right lung.  12.  Review of records, dating back to October 2021, indicates patient had a very similar presentation as to that that occurred today.  13.  Place OG tube.  Patient on multiple antihypertensives as an outpatient.  Check KUB after placement to confirm appropriate positioning prior to use.  14.  Lovenox and Pepcid prophylaxis for now  15.  Patient is critically ill.  Greater than 30 minutes of  critical care time spent.  Work-up ongoing.      Electronically signed by Heath Prado MD, 03/05/22, 12:36 CST.      Electronically signed by Heath Prado MD at 03/05/22 1245                   Physician Progress Notes (last 72 hours)      Surjit Avitia MD at 03/06/22 1034          Nephrology ([facility]) Progress Note      Patient:  Ena Upton  YOB: 1970  Date of Service: 3/6/2022  MRN: [id number]   Acct: [id number]   Primary Care Physician: Yaron Wiggins APRN  Advance Directive:   There are no questions and answers to display.     Admit Date: 3/5/2022       Hospital Day: 1  Referring Provider: No Known Provider      Patient personally seen and examined.  Complete chart including Consults, Notes, Operative Reports, Labs, Cardiology, and Radiology studies reviewed as able.        Subjective:  Ena Upton is a 52 y.o. female for whom we were consulted for evaluation and treatment of end stage renal disease. Maintenance hemodialysis Tuesday Thursday Saturday [facility]. Unknown last time she went to HD.  History of type 2 diabetes, hypertension, hypothyroidism.  Frequent hospitalizations due to noncompliance with dialysis and her other medical treatments including antihypertensives.  She was brought in by EMS found down at home.  Blood glucose was over 900 and the patient was on the ventilator and unable to participate in history physical examination.  Patient was seen on tolerating dialysis.  Events reviewed with nursing at the bedside as well as with Dr. Prado.  Blood pressure trends improved with Cardene infusion.    Today, no overnight events.  She was able to extubated this morning but lethargic from hypoglycemia and so unable to fully dissipate history and physical examination.  Events reviewed with nursing.  No issues with dialysis yesterday.    Allergies:  Penicillins, Lamisil [terbinafine], Reglan  [metoclopramide], and Stadol [butorphanol]    Home Meds:  Medications Prior to Admission   Medication Sig Dispense Refill Last Dose   • acetaminophen (TYLENOL) 325 MG tablet Take 2 tablets by mouth Every 6 (Six) Hours As Needed for Mild Pain .      • albuterol (PROVENTIL HFA;VENTOLIN HFA) 108 (90 BASE) MCG/ACT inhaler Inhale 2 puffs Every 4 (Four) Hours As Needed for wheezing.      • carvedilol (COREG) 25 MG tablet Take 1 tablet by mouth 2 (Two) Times a Day With Meals. 60 tablet 3    • cloNIDine (CATAPRES) 0.3 MG tablet Take 1 tablet by mouth Every 8 (Eight) Hours. 90 tablet 3    • DULoxetine (CYMBALTA) 60 MG capsule Take 60 mg by mouth daily.      • famotidine (PEPCID) 20 MG tablet Take 20 mg by mouth 2 (Two) Times a Day.      • hydrALAZINE (APRESOLINE) 50 MG tablet Take 1.5 tablets by mouth 3 (Three) Times a Day. 120 tablet 3    • insulin aspart (novoLOG) 100 UNIT/ML injection Inject 10 Units under the skin into the appropriate area as directed 3 (Three) Times a Day Before Meals.      • Insulin Glargine (BASAGLAR KWIKPEN) 100 UNIT/ML injection pen Inject 40 Units under the skin into the appropriate area as directed Every Night.      • isosorbide mononitrate (IMDUR) 60 MG 24 hr tablet Take 1 tablet by mouth Daily.      • levothyroxine (SYNTHROID, LEVOTHROID) 50 MCG tablet Take 1 tablet by mouth Daily. 30 tablet 3    • lisinopril (PRINIVIL,ZESTRIL) 20 MG tablet Take 20 mg by mouth 2 (Two) Times a Day.      • nicotine (NICODERM CQ) 21 MG/24HR patch Place 1 patch on the skin as directed by provider Daily.      • NIFEdipine XL (PROCARDIA XL) 60 MG 24 hr tablet Take 60 mg by mouth 2 (two) times a day.      • rOPINIRole (REQUIP) 4 MG tablet Take 4 mg by mouth Every Night.      • simvastatin (ZOCOR) 20 MG tablet Take 40 mg by mouth Daily.      • spironolactone (ALDACTONE) 25 MG tablet Take 1 tablet by mouth Daily.          Medicines:  Current Facility-Administered Medications   Medication Dose Route Frequency Provider  Last Rate Last Admin   • atorvastatin (LIPITOR) tablet 20 mg  20 mg Oral Nightly Heath Prado MD       • carvedilol (COREG) tablet 12.5 mg  12.5 mg Oral BID With Meals Heath Prado MD   12.5 mg at 03/06/22 0811   • cefTRIAXone (ROCEPHIN) 1 g/100 mL 0.9% NS (MBP)  1 g Intravenous Q24H Heath Prado MD       • chlorhexidine (PERIDEX) 0.12 % solution 15 mL  15 mL Mouth/Throat Q12H Heath Prado MD   15 mL at 03/05/22 2013   • dextrose (D50W) (25 g/50 mL) IV injection 12.5 g  12.5 g Intravenous PRN Heath Prado MD   12.5 g at 03/06/22 0254   • dextrose (D50W) (25 g/50 mL) IV injection 25 g  25 g Intravenous Q15 Min PRN Ean Dumont MD   25 g at 03/06/22 0951   • dextrose (GLUTOSE) oral gel 15 g  15 g Oral Q15 Min PRN Ean Dumont MD       • dextrose 5 % and sodium chloride 0.45 % infusion  50 mL/hr Intravenous Continuous PRN Heath Prado MD       • dextrose 5 % and sodium chloride 0.45 % with KCl 20 mEq/L infusion  50 mL/hr Intravenous Continuous PRN Heath Prado MD       • dextrose 5 % and sodium chloride 0.9 % infusion  50 mL/hr Intravenous Continuous PRN Heath Prado MD       • enoxaparin (LOVENOX) syringe 30 mg  30 mg Subcutaneous Q24H Heath Prado MD   30 mg at 03/05/22 1303   • famotidine (PEPCID) injection 20 mg  20 mg Intravenous Daily Heath Prado MD   20 mg at 03/05/22 1304   • glucagon (human recombinant) (GLUCAGEN DIAGNOSTIC) injection 1 mg  1 mg Intramuscular Q15 Min PRN Ean Dumont MD       • hydrALAZINE (APRESOLINE) tablet 50 mg  50 mg Oral Q8H Heath Prado MD   50 mg at 03/06/22 0811   • insulin detemir (LEVEMIR) injection 20 Units  20 Units Subcutaneous Nightly Ean Dumont MD   20 Units at 03/06/22 0447   • insulin lispro (humaLOG) injection 2-7 Units  2-7 Units Subcutaneous Q4H Ean Dumont MD       • ipratropium-albuterol (DUO-NEB) nebulizer solution 3 mL  3 mL Nebulization  4x Daily - RT Heath Prado MD   3 mL at 03/06/22 1027   • labetalol (NORMODYNE,TRANDATE) injection 10 mg  10 mg Intravenous Q4H PRN Heath Prado MD   10 mg at 03/06/22 0816   • levothyroxine (SYNTHROID, LEVOTHROID) tablet 50 mcg  50 mcg Oral Q AM Heath Prado MD   50 mcg at 03/06/22 0811   • lisinopril (PRINIVIL,ZESTRIL) tablet 20 mg  20 mg Oral Q12H Heath Prado MD   20 mg at 03/06/22 0811   • LORazepam (ATIVAN) injection 1 mg  1 mg Intravenous Q4H PRN Heath Prado MD   1 mg at 03/05/22 1303   • Morphine sulfate (PF) injection 2 mg  2 mg Intravenous Q4H PRN Heath Prado MD   2 mg at 03/05/22 1303   • niCARdipine (CARDENE) 25 mg in 250 mL NS infusion  5-15 mg/hr Intravenous Titrated Heath Pardo MD   Held at 03/05/22 1434   • ondansetron (ZOFRAN) injection 4 mg  4 mg Intravenous Q6H PRN Heath Prado MD       • propofol (DIPRIVAN) infusion 10 mg/mL 100 mL  5-50 mcg/kg/min Intravenous Titrated Heath Prado MD 19.37 mL/hr at 03/06/22 0608 40 mcg/kg/min at 03/06/22 0608   • sodium chloride 0.45 % infusion  50 mL/hr Intravenous Continuous PRN Heath Prado MD       • sodium chloride 0.45 % with KCl 20 mEq/L infusion  50 mL/hr Intravenous Continuous PRN Heath Prado MD       • sodium chloride 0.9 % flush 10 mL  10 mL Intravenous Q12H Heath Prado MD       • sodium chloride 0.9 % flush 10 mL  10 mL Intravenous PRN Heath Prado MD       • sodium chloride 0.9 % flush 10 mL  10 mL Intravenous Once PRN Heath Prado MD       • sodium chloride 0.9 % flush 10 mL  10 mL Intravenous Q12H Heath Prado MD   10 mL at 03/06/22 0811   • sodium chloride 0.9 % flush 10 mL  10 mL Intravenous PRN Heath Prado MD       • sodium chloride 0.9 % infusion  50 mL/hr Intravenous Continuous PRN Heath Prado MD       • sodium chloride 0.9 % infusion  10 mL/hr Intravenous Continuous PRN Johan  Heath CASTRO MD       • sodium chloride 0.9 % with KCl 20 mEq/L infusion  50 mL/hr Intravenous Continuous PRN Heath Prado MD 50 mL/hr at 03/05/22 2000 50 mL/hr at 03/05/22 2000   • [START ON 3/8/2022] vancomycin 750 mg/250 mL 0.9% NS IVPB ([facility])  10 mg/kg Intravenous Q48H Heath Prado MD           Past Medical History:  Past Medical History:   Diagnosis Date   • Anxiety    • Arthritis    • Chronic kidney disease     STAGE V RENAL FAILURE   • Depression    • Diabetes mellitus (HCC)    • HTN (hypertension)    • Hypothyroidism    • Obesity    • Tremors of nervous system        Past Surgical History:  Past Surgical History:   Procedure Laterality Date   • ARTERIOVENOUS FISTULA     • BREAST BIOPSY     • CARPAL TUNNEL RELEASE     • CATH LAB PROCEDURE     • CHOLECYSTECTOMY     • TUBAL ABDOMINAL LIGATION         Family History  Family History   Problem Relation Age of Onset   • Cancer Other    • Hypertension Other    • Kidney disease Other    • Heart disease Other    • Diabetes Other    • Diabetes Mother    • Diabetes Maternal Aunt    • Diabetes Maternal Grandmother        Social History  Social History     Socioeconomic History   • Marital status:    Tobacco Use   • Smoking status: Current Every Day Smoker     Packs/day: 0.50   • Smokeless tobacco: Never Used   • Tobacco comment: CUTTING BACK AND TRYING TO STOP SMOKING   Substance and Sexual Activity   • Alcohol use: No   • Drug use: No   • Sexual activity: Defer       Review of Systems:  History obtained from unobtainable from patient due to mental status      Objective:  Patient Vitals for the past 24 hrs:   BP Temp Temp src Pulse Resp SpO2 Height Weight   03/06/22 1027 -- -- -- 71 12 100 % -- --   03/06/22 0649 -- -- -- 87 18 -- -- --   03/06/22 0639 -- -- -- 85 18 100 % -- --   03/06/22 0500 129/67 -- -- 82 -- 100 % -- --   03/06/22 0400 130/72 99.5 °F (37.5 °C) -- 81 -- 100 % -- 76.1 kg (167 lb 12.3 oz)   03/06/22 0300 135/67 -- -- 81 -- 100 %  "-- --   03/06/22 0200 113/62 -- -- 77 -- 100 % -- --   03/06/22 0100 121/63 -- -- 77 -- 100 % -- --   03/06/22 0000 111/63 97.8 °F (36.6 °C) Axillary 75 -- 100 % -- --   03/05/22 2300 114/66 -- -- 74 -- 100 % -- --   03/05/22 2200 117/66 -- -- 74 -- 100 % -- --   03/05/22 2100 107/64 -- -- 73 -- 100 % -- --   03/05/22 2000 127/71 97.6 °F (36.4 °C) -- 77 18 100 % -- --   03/05/22 1911 -- -- -- 76 18 100 % -- --   03/05/22 1900 125/70 -- -- 74 -- 100 % -- --   03/05/22 1845 121/70 -- -- 76 -- 100 % -- --   03/05/22 1830 119/67 -- -- 75 -- 100 % -- --   03/05/22 1815 120/67 -- -- 76 -- 100 % -- --   03/05/22 1800 133/69 -- -- 77 -- 100 % -- --   03/05/22 1745 142/73 -- -- 80 -- 100 % -- --   03/05/22 1730 136/72 -- -- 79 -- 100 % -- --   03/05/22 1715 131/71 -- -- 80 -- 100 % -- --   03/05/22 1700 111/69 -- -- 81 -- 100 % -- --   03/05/22 1645 106/65 -- -- 79 -- 100 % -- --   03/05/22 1630 104/64 -- -- 77 -- 99 % -- --   03/05/22 1615 109/65 -- -- 80 -- 99 % -- --   03/05/22 1609 -- -- -- 81 19 -- -- --   03/05/22 1603 -- -- -- 79 18 97 % -- --   03/05/22 1600 102/63 -- -- 78 -- 98 % -- --   03/05/22 1545 110/65 -- -- 79 -- 98 % -- --   03/05/22 1530 110/66 96.9 °F (36.1 °C) Axillary 78 -- 98 % -- --   03/05/22 1515 108/65 -- -- 79 -- 98 % -- --   03/05/22 1500 105/66 -- -- 80 -- 98 % -- --   03/05/22 1445 113/66 -- -- 79 -- 100 % -- --   03/05/22 1430 105/66 -- -- 76 -- 100 % -- --   03/05/22 1415 110/68 -- -- 76 -- 100 % -- --   03/05/22 1400 119/68 -- -- 75 -- 100 % -- --   03/05/22 1345 128/63 -- -- 77 -- 100 % -- --   03/05/22 1330 127/64 -- -- 77 -- 98 % -- --   03/05/22 1315 125/74 -- -- 77 -- 100 % -- --   03/05/22 1300 134/67 -- -- 82 -- 100 % -- --   03/05/22 1245 138/69 -- -- 86 -- 100 % -- --   03/05/22 1230 140/70 -- -- 87 -- 100 % -- --   03/05/22 1215 (!) 133/105 -- -- 88 -- 100 % -- --   03/05/22 1200 -- 97.8 °F (36.6 °C) Axillary -- -- -- 157.5 cm (62\") 75.5 kg (166 lb 7.2 oz)   03/05/22 1149 174/96 " 97.1 °F (36.2 °C) Rectal 97 18 100 % -- --   03/05/22 1134 (!) 195/83 -- -- 94 20 100 % -- --   03/05/22 1124 (!) 201/87 -- -- 96 20 99 % -- --   03/05/22 1109 (!) 192/88 -- -- 97 18 100 % -- --   03/05/22 1047 (!) 190/92 -- -- 92 19 99 % -- --   03/05/22 1038 (!) 232/92 -- -- 100 18 99 % -- --       Intake/Output Summary (Last 24 hours) at 3/6/2022 1034  Last data filed at 3/6/2022 0400  Gross per 24 hour   Intake 1353.57 ml   Output 1 ml   Net 1352.57 ml     General: Lethargic and chronically ill-appearing  Chest:  clear to auscultation bilaterally without respiratory distress  CVS: regular rate and rhythm  Abdominal: soft, nontender, positive bowel sounds  Extremities: no cyanosis or edema  Skin: warm and dry without rash      Labs:  Results from last 7 days   Lab Units 03/05/22  1237 03/05/22  1010   WBC 10*3/mm3 8.12 7.17   HEMOGLOBIN g/dL 9.1* 10.7*   HEMATOCRIT % 28.7* 33.7*   PLATELETS 10*3/mm3 151 193         Results from last 7 days   Lab Units 03/06/22  0420 03/06/22  0024 03/05/22  2000 03/05/22  1237 03/05/22  1042   SODIUM mmol/L 134* 132* 132*   < > 119*   POTASSIUM mmol/L 3.8 3.2* 3.0*   < > 4.3   CHLORIDE mmol/L 97* 96* 94*   < > 82*   CO2 mmol/L 24.0 25.0 26.0   < > 21.0*   BUN mg/dL 29* 28* 26*   < > 49*   CREATININE mg/dL 3.24* 3.09* 2.91*   < > 4.66*   CALCIUM mg/dL 8.5* 8.2* 8.1*   < > 8.4*   EGFR mL/min/1.73 16.5* 17.4* 18.7*   < > 10.6*   BILIRUBIN mg/dL  --  0.3  --   --  0.4   ALK PHOS U/L  --  85  --   --  138*   ALT (SGPT) U/L  --  15  --   --  23   AST (SGOT) U/L  --  17  --   --  38*   GLUCOSE mg/dL 66 188* 378*   < > 972*    < > = values in this interval not displayed.       Radiology:   Imaging Results (Last 72 Hours)     Procedure Component Value Units Date/Time    XR Chest 1 View [[id number]] Collected: 03/06/22 0822     Updated: 03/06/22 0826    Narrative:      EXAMINATION: XR CHEST 1 VW- 3/6/2022 8:22 AM CST     HISTORY: Intubated Patient; R41.82-Altered mental status,  unspecified.     REPORT: A frontal view of the chest was obtained.     COMPARISON: Chest x-ray 3/5/2022 1002 hours.     The endotracheal tube and NG tube appear in satisfactory position , the  right lung is clear, there are interstitial infiltrates in the left  medial lung extending into the left lung base, with there is also  probable atelectasis. No pneumothorax or pleural effusion is identified.  Vascular stents are noted in the left subclavian region, left upper arm.       Impression:      Interval insertion of an NG tube, in satisfactory position.  The endotracheal tube remains in good position. No change in patchy  interstitial infiltrates in the left lung and streaky atelectasis in the  left lung base.  This report was finalized on 03/06/2022 08:23 by Dr. Farhad Munoz MD.    XR Abdomen KUB [[id number]] Collected: 03/05/22 1723     Updated: 03/05/22 1726    Narrative:      HISTORY: NG placement     KUB: Frontal view of the lower chest and upper abdomen obtained.     Enteric tube tip is present within the body of the stomach. The  side-port at the GE junction. Left retrocardiac opacities.  Cholecystectomy clips.       Impression:      1. Enteric tube tip in the body the stomach with side-port at the GE  junction.  This report was finalized on 03/05/2022 17:23 by Dr. Desire Castro MD.    CT Cervical Spine Without Contrast [[id number]] Collected: 03/05/22 1047     Updated: 03/05/22 1054    Narrative:      EXAMINATION: CT CERVICAL SPINE WO CONTRAST- 3/5/2022 10:47 AM CST     HISTORY: Neck pain, acute, no red flags; R41.82-Altered mental status,  unspecified. Patient found on floor unresponsive. Unwitnessed fall.     DOSE: 362 mGycm (Automatic exposure control technique was implemented in  an effort to keep the radiation dose as low as possible without  compromising image quality)     REPORT: Spiral CT of the cervical spine was performed without contrast,  reconstructed coronal and sagittal images are also  reviewed.     COMPARISON: There are no correlative imaging studies for comparison.     Sagittal images demonstrate straightening of the cervical curvature  without spondylolisthesis. There is moderate degenerative disc disease  at C6-7 with endplate spurring and mild disc space narrowing. No  fracture is identified. The patient is intubated and there is soft  tissue swelling in the hypopharynx. There is normal aeration of the  mastoid air cells. There is mild right-sided neuroforaminal narrowing at  C6-7 related to uncinate spurs. There is mild spinal stenosis at C6-7.  Soft tissue windows show no gross evidence of a traumatic cervical disc  herniation. The visualized upper lungs are clear. There is moderately  heavy calcified plaque within the carotid arteries at the bifurcation.  The       Impression:      No fracture, no acute osseous cervical spine abnormality.  Partial visualization of the endotracheal tube, with soft tissue  swelling in the hypopharynx, not unexpected. Moderate spondylosis at  C6-7, with mild spinal stenosis right-sided neuroforaminal narrowing..              This report was finalized on 03/05/2022 10:51 by Dr. Farhad Munoz MD.    CT Head Without Contrast [[id number]] Collected: 03/05/22 1044     Updated: 03/05/22 1049    Narrative:      EXAMINATION: CT HEAD WO CONTRAST- 3/5/2022 10:44 AM CST     HISTORY: Delirium; R41.82-Altered mental status, unspecified.  Unresponsive.     DOSE: 625 mGycm (Automatic exposure control technique was implemented in  an effort to keep the radiation dose as low as possible without  compromising image quality)     REPORT: Spiral CT of the head was performed without contrast,  reconstructed coronal and sagittal images are also reviewed.     COMPARISON: CT head without contrast 10/5/2021.     No intracranial hemorrhage, mass or mass effect is identified. There is  mildly decreased attenuation in the periventricular white matter tracts  as before, compatible with  mild chronic small vessel white matter  ischemic disease. The ventricles and basal cisterns are within normal  limits. Review of bone windows is negative for fractures. There soft  tissue swelling in the posterior nasopharynx which may be related to  recent endotracheal tube insertion.       Impression:      No acute intracranial abnormality. Mild chronic small vessel  white matter ischemic changes appear stable.     This report was finalized on 03/05/2022 10:46 by Dr. Farhad Munoz MD.    XR Chest 1 View [[id number]] Collected: 03/05/22 1035     Updated: 03/05/22 1040    Narrative:      EXAMINATION: XR CHEST 1 VW- 3/5/2022 10:35 AM CST     HISTORY: intubation; R41.82-Altered mental status, unspecified.     REPORT: A frontal view of the chest was obtained.     COMPARISON: Chest x-ray 10/4/2021.     The patient is intubated, the tip of the endotracheal tube appears in  satisfactory position, approximately 2.8 cm superior to the jessie.  There are interstitial infiltrates in the left midlung and lower lung  zones, with associated volume loss. The right lung is clear. There is  mild cardiomegaly, this may be exaggerated by technique and positioning.  The patient is on a backboard. There is a stent in the left subclavian  and axillary region as before. No pneumothorax or pleural effusion is  identified. The upper abdomen appears unremarkable.       Impression:      Satisfactory position of the endotracheal tube. Interstitial  infiltrates are seen throughout most of the left lung without  consolidation. There is mild cardiomegaly, without evidence of overt  CHF.  This report was finalized on 03/05/2022 10:37 by Dr. Farhad Munoz MD.          Culture:  Urine Culture   Date Value Ref Range Status   03/05/2022 50,000 CFU/mL Staphylococcus, coagulase negative (A)  Final     Comment:     By laboratory criteria, addititional testing is not indicated and unlikely to produce clinically relevant information.  Coagulase  negative staphylococci are common skin contaminants, members of the oral lilly,  and of low virulence; requires clinical correlation.           Assessment   End-stage renal disease  Diabetes type 2 with diabetic nephropathy  Hypertension  Hyperosmolar hyperglycemic on admission  Hyponatremia due to volume overload/profound hyperglycemia on admit  Metabolic acidosis  Acute metabolic encephalopathy  Anemia in chronic kidney disease  Secondary hyperparathyroidism  Hypothyroidism     Plan:  Hemodialysis Tuesday Thursday Saturday per routine scheduling  Monitor labs  Ultrafiltration as tolerated with dialysis 3/5  Continue to adjust bp meds as needed   Sodium level improved with correction of hyperglycemia and treatments adjusted for current hypoglycemia  No emergent occasion for dialysis today, reassess in the morning  Discussed with nursing and patient as able  Potassium levels currently adequate but is on a continuous infusion with potassium in it and needs to be monitored with caution in dialysis patient      Surjit Avitia MD  3/6/2022  10:34 CST        Electronically signed by Surjit Avitia MD at 03/06/22 1036     Heath Prado MD at 03/06/22 0651              [facility] Medicine Services  INPATIENT PROGRESS NOTE    Patient Name: Ena Upton  Date of Admission: 3/5/2022  Today's Date: 03/06/22  Length of Stay: 1  Primary Care Physician: Yaron Wiggins APRN    Subjective   Chief Complaint: patient found unresponsive  HPI   No acute events from overnight.  Blood sugars are markedly improved.  Patient was able to come off of insulin drip, and was administered a dose of Levemir around 0500.  She has had a blood sugar that trended down to 29 this morning and patient was promptly administered dextrose.  Repeat blood sugar 120.  She is currently on intravenous fluids containing dextrose at a slow rate.  Cardene IV drip is off.  Patient did receive  dialysis/ultrafiltration yesterday.  She is currently on propofol for sedation.  She remains on minimal vent settings at 30% FiO2 and 5 of PEEP.    Review of Systems     All pertinent negatives and positives are as above. All other systems have been reviewed and are negative unless otherwise stated.     Objective    Temp:  [96.9 °F (36.1 °C)-99.5 °F (37.5 °C)] 99.5 °F (37.5 °C)  Heart Rate:  [] 87  Resp:  [16-20] 18  BP: (102-257)/() 129/67  FiO2 (%):  [30 %-100 %] 30 %  Physical Exam  Vitals reviewed.   Constitutional:       Appearance: She is ill-appearing.   HENT:      Head: Normocephalic.      Mouth/Throat:      Pharynx: No oropharyngeal exudate (ET tube and NG tube in lace).   Cardiovascular:      Rate and Rhythm: Normal rate.      Heart sounds: No murmur heard.  Pulmonary:      Effort: Pulmonary effort is normal.      Breath sounds: No rales.      Comments: No prominent rhonchi or rales; on 30% FiO2 and 5 of PEEP  Abdominal:      Palpations: Abdomen is soft.   Musculoskeletal:      Cervical back: Neck supple.      Right lower leg: Edema present.      Left lower leg: Edema present.      Comments: AV fistula left upper extremity   Skin:     General: Skin is warm.      Capillary Refill: Capillary refill takes less than 2 seconds.   Neurological:      Comments: Intubated and sedated   Psychiatric:      Comments: Intubated and sedated         Results Review:  I have reviewed the labs, radiology results, and diagnostic studies.    Laboratory Data:   Results from last 7 days   Lab Units 03/05/22  1237 03/05/22  1010   WBC 10*3/mm3 8.12 7.17   HEMOGLOBIN g/dL 9.1* 10.7*   HEMATOCRIT % 28.7* 33.7*   PLATELETS 10*3/mm3 151 193        Results from last 7 days   Lab Units 03/06/22  0420 03/06/22  0024 03/05/22  2000 03/05/22  1237 03/05/22  1042   SODIUM mmol/L 134* 132* 132*   < > 119*   POTASSIUM mmol/L 3.8 3.2* 3.0*   < > 4.3   CHLORIDE mmol/L 97* 96* 94*   < > 82*   CO2 mmol/L 24.0 25.0 26.0   < > 21.0*    BUN mg/dL 29* 28* 26*   < > 49*   CREATININE mg/dL 3.24* 3.09* 2.91*   < > 4.66*   CALCIUM mg/dL 8.5* 8.2* 8.1*   < > 8.4*   BILIRUBIN mg/dL  --  0.3  --   --  0.4   ALK PHOS U/L  --  85  --   --  138*   ALT (SGPT) U/L  --  15  --   --  23   AST (SGOT) U/L  --  17  --   --  38*   GLUCOSE mg/dL 66 188* 378*   < > 972*    < > = values in this interval not displayed.       Culture Data:   No results found for: BLOODCX, URINECX, WOUNDCX, MRSACX, RESPCX, STOOLCX    Radiology Data:   Imaging Results (Last 24 Hours)     Procedure Component Value Units Date/Time    XR Chest 1 View [[id number]] Resulted: 03/06/22 0428     Updated: 03/06/22 0440    XR Abdomen KUB [[id number]] Collected: 03/05/22 1723     Updated: 03/05/22 1726    Narrative:      HISTORY: NG placement     KUB: Frontal view of the lower chest and upper abdomen obtained.     Enteric tube tip is present within the body of the stomach. The  side-port at the GE junction. Left retrocardiac opacities.  Cholecystectomy clips.       Impression:      1. Enteric tube tip in the body the stomach with side-port at the GE  junction.  This report was finalized on 03/05/2022 17:23 by Dr. Desire Castro MD.    CT Cervical Spine Without Contrast [[id number]] Collected: 03/05/22 1047     Updated: 03/05/22 1054    Narrative:      EXAMINATION: CT CERVICAL SPINE WO CONTRAST- 3/5/2022 10:47 AM CST     HISTORY: Neck pain, acute, no red flags; R41.82-Altered mental status,  unspecified. Patient found on floor unresponsive. Unwitnessed fall.     DOSE: 362 mGycm (Automatic exposure control technique was implemented in  an effort to keep the radiation dose as low as possible without  compromising image quality)     REPORT: Spiral CT of the cervical spine was performed without contrast,  reconstructed coronal and sagittal images are also reviewed.     COMPARISON: There are no correlative imaging studies for comparison.     Sagittal images demonstrate straightening of the cervical  curvature  without spondylolisthesis. There is moderate degenerative disc disease  at C6-7 with endplate spurring and mild disc space narrowing. No  fracture is identified. The patient is intubated and there is soft  tissue swelling in the hypopharynx. There is normal aeration of the  mastoid air cells. There is mild right-sided neuroforaminal narrowing at  C6-7 related to uncinate spurs. There is mild spinal stenosis at C6-7.  Soft tissue windows show no gross evidence of a traumatic cervical disc  herniation. The visualized upper lungs are clear. There is moderately  heavy calcified plaque within the carotid arteries at the bifurcation.  The       Impression:      No fracture, no acute osseous cervical spine abnormality.  Partial visualization of the endotracheal tube, with soft tissue  swelling in the hypopharynx, not unexpected. Moderate spondylosis at  C6-7, with mild spinal stenosis right-sided neuroforaminal narrowing..              This report was finalized on 03/05/2022 10:51 by Dr. Farhad Munoz MD.    CT Head Without Contrast [[id number]] Collected: 03/05/22 1044     Updated: 03/05/22 1049    Narrative:      EXAMINATION: CT HEAD WO CONTRAST- 3/5/2022 10:44 AM CST     HISTORY: Delirium; R41.82-Altered mental status, unspecified.  Unresponsive.     DOSE: 625 mGycm (Automatic exposure control technique was implemented in  an effort to keep the radiation dose as low as possible without  compromising image quality)     REPORT: Spiral CT of the head was performed without contrast,  reconstructed coronal and sagittal images are also reviewed.     COMPARISON: CT head without contrast 10/5/2021.     No intracranial hemorrhage, mass or mass effect is identified. There is  mildly decreased attenuation in the periventricular white matter tracts  as before, compatible with mild chronic small vessel white matter  ischemic disease. The ventricles and basal cisterns are within normal  limits. Review of bone windows is  negative for fractures. There soft  tissue swelling in the posterior nasopharynx which may be related to  recent endotracheal tube insertion.       Impression:      No acute intracranial abnormality. Mild chronic small vessel  white matter ischemic changes appear stable.     This report was finalized on 03/05/2022 10:46 by Dr. Farhad Munoz MD.    XR Chest 1 View [[id number]] Collected: 03/05/22 1035     Updated: 03/05/22 1040    Narrative:      EXAMINATION: XR CHEST 1 VW- 3/5/2022 10:35 AM CST     HISTORY: intubation; R41.82-Altered mental status, unspecified.     REPORT: A frontal view of the chest was obtained.     COMPARISON: Chest x-ray 10/4/2021.     The patient is intubated, the tip of the endotracheal tube appears in  satisfactory position, approximately 2.8 cm superior to the jessie.  There are interstitial infiltrates in the left midlung and lower lung  zones, with associated volume loss. The right lung is clear. There is  mild cardiomegaly, this may be exaggerated by technique and positioning.  The patient is on a backboard. There is a stent in the left subclavian  and axillary region as before. No pneumothorax or pleural effusion is  identified. The upper abdomen appears unremarkable.       Impression:      Satisfactory position of the endotracheal tube. Interstitial  infiltrates are seen throughout most of the left lung without  consolidation. There is mild cardiomegaly, without evidence of overt  CHF.  This report was finalized on 03/05/2022 10:37 by Dr. Farhad Munoz MD.          I have reviewed the patient's current medications.     Assessment/Plan     Active Hospital Problems    Diagnosis    • Acute encephalopathy    • Elevated troponin    • Hyponatremia    • HHNC (hyperglycemic hyperosmolar nonketotic coma) ([facility])    • Hypertensive emergency    • Hypothyroidism    • Altered mental status    • ESRD (end stage renal disease) ([facility])        Plan:  1.  Insulin gtt now off, in fact had to be  administered dextrose this morning in the setting of hypoglycemia  2.  Cardene IV gtt now off  3.  On minimal vent settings at 30% FiO2 and 5 of PEEP  4.  Would like to see if we can wean sedation, assess mental status, and also assess readiness for spontaneous breathing trial and possible extubation.  5.  Pulmonary consulted and appreciate their assistance  6.  Patient did receive dialysis/ultrafiltration yesterday after arrival to the ICU; nephrology following.  Patient normally on a Tuesday, Thursday, Saturday schedule.  7.  I am going to restart some of her outpatient antihypertensive medications including Coreg, lisinopril, and hydralazine (via NG tube).  Review of her medication reconciliation list also includes the antihypertensives: Spironolactone, Procardia XL, clonidine, and Imdur  8.  Lovenox and Pepcid prophylaxis for now  9.  Scheduled nebulizer treatments  10.  Patient has been started on insulin Levemir and received her first dose at 0500 this AM; sliding scale insulin coverage; monitor blood sugar trend closely especially with trend towards hypoglycemia this AM that required administration of dextrose.  11.  Her A1c trend as follows:    12.  Nursing just notified that patient did have a blood culture returned for gram-positive cocci - results currently not available in Epic.  We will start empiric treatment with vancomycin, and monitor results of blood cultures closely.  So far patient has been afebrile.  Her white blood cell count has been normal.  Her procalcitonin was slightly elevated at 0.28 with a CRP of 8.03.  13.  Troponin with flat trend and do no suspect ACS but rather demand event given her clinical presentation.  14.  Echo results as follows:  Results for orders placed during the hospital encounter of 03/05/22    Adult Transthoracic Echo Complete W/ Cont if Necessary Per Protocol    Interpretation Summary  · Left ventricular systolic function is normal. Left ventricular ejection fraction  appears to be 61 - 65%.  · Left ventricular wall thickness is consistent with mild concentric hypertrophy.  · Normal right ventricular cavity size and systolic function noted.  · Mild mitral valve stenosis is present.  · Trace tricuspid valve regurgitation is present. Estimated right ventricular systolic pressure from tricuspid regurgitation is normal (<35 mmHg).    15.  Seizure precautions  16.  ICU care. > 30 min critical care time spent.      Electronically signed by Heath Prado MD, 03/06/22, 06:52 CST.    Addendum: Notified by nursing staff a few minutes ago that repeat labs obtained this afternoon revealed a potassium level of 6.6.    We will order calcium gluconate, insulin, dextrose, in addition to a dose of Lokelma to be administered via NG tube now.    Patient was receiving intravenous fluids containing potassium supplement (part of the DKA/HHS protocol) and those fluids will be discontinued now.  In fact, I will discontinue all orders for fluids containing potassium supplementation.  Patient has been having hypokalemia recently with potassium levels of 3.0, 3.2, and most recently 3.8 earlier this morning.    Continuous telemetry monitoring in the setting of hyperkalemia.    Hold ACE inhibitor.    We will repeat potassium level here in the next few hours and reassess.  Nephrology following.    Electronically signed by Heath Prado MD, 03/06/22, 15:09 CST.          Electronically signed by Heath Prado MD at 03/06/22 1509          Consult Notes (last 72 hours)      Zach Powers MD at 03/06/22 0805      Consult Orders    1. Inpatient Pulmonology Consult [[id number]] ordered by Heath Prado MD at 03/05/22 1215                   PULMONARY AND CRITICAL CARE CONSULT - [facility]    Ena Upton   MR# [id number]  Acct# [id number]  3/6/2022   11:38 CST    Referring Provider: Heath Prado MD    Chief Complaint: Mechanically ventilated    HPI: We  are consulted by Heath Prado MD to see this 52 y.o. female born on 1970.  Patient currently intubated and unable to provide any information.  Sedation is off.  Her eyes are open.  She is following commands.  She is on a breathing trial at this time.  He was admitted to the hospital yesterday after she was brought in by EMS for altered mental status.  Patient's blood sugar was over 900.  She is a routine dialysis patient.  Unknown when she had dialysis recently.  She was placed on Cardene and an insulin drip overnight.  Chart review reflects some issues with hypoglycemia overnight and discontinuation of the drip.  ABGs adequate this morning on review.  Stable bilateral interstitial lung infiltrates.  No known history of lung disease.  No family present.  Discussed with RT at bedside.  We will plan for extubation this morning.    Past Medical History   has a past medical history of Anxiety, Arthritis, Chronic kidney disease, Depression, Diabetes mellitus (HCC), HTN (hypertension), Hypothyroidism, Obesity, and Tremors of nervous system.   has a past surgical history that includes Breast biopsy; AV fistula placement; Cholecystectomy; Carpal tunnel release; Tubal ligation; and cath lab procedure.  Allergies   Allergen Reactions   • Penicillins Hives and Shortness Of Breath   • Lamisil [Terbinafine]    • Reglan [Metoclopramide] GI Intolerance   • Stadol [Butorphanol] Nausea And Vomiting     Medications  atorvastatin, 20 mg, Oral, Nightly  carvedilol, 12.5 mg, Oral, BID With Meals  cefTRIAXone, 1 g, Intravenous, Q24H  chlorhexidine, 15 mL, Mouth/Throat, Q12H  enoxaparin, 30 mg, Subcutaneous, Q24H  famotidine, 20 mg, Intravenous, Daily  hydrALAZINE, 50 mg, Oral, Q8H  insulin detemir, 20 Units, Subcutaneous, Nightly  insulin lispro, 2-7 Units, Subcutaneous, Q4H  ipratropium-albuterol, 3 mL, Nebulization, 4x Daily - RT  levothyroxine, 50 mcg, Oral, Q AM  lisinopril, 20 mg, Oral, Q12H  sodium chloride, 10 mL,  Intravenous, Q12H  sodium chloride, 10 mL, Intravenous, Q12H  [START ON 3/8/2022] vancomycin, 10 mg/kg, Intravenous, Q48H      dextrose 5 % and sodium chloride 0.45 %, 50 mL/hr  dextrose 5 % and sodium chloride 0.45 % with KCl 20 mEq/L, 50 mL/hr  dextrose 5 % and sodium chloride 0.9 %, 50 mL/hr  niCARdipine, 5-15 mg/hr, Last Rate: Stopped (03/05/22 1434)  propofol, 5-50 mcg/kg/min, Last Rate: 40 mcg/kg/min (03/06/22 0608)  sodium chloride, 50 mL/hr  sodium chloride 0.45 % with KCl 20 mEq, 50 mL/hr  sodium chloride, 50 mL/hr  sodium chloride, 10 mL/hr  sodium chloride 0.9 % with KCl 20 mEq, 50 mL/hr, Last Rate: 50 mL/hr (03/05/22 2000)      Social History   reports that she has been smoking. She has been smoking about 0.50 packs per day. She has never used smokeless tobacco. She reports that she does not drink alcohol and does not use drugs.  Family History  family history includes Cancer in an other family member; Diabetes in her maternal aunt, maternal grandmother, mother, and another family member; Heart disease in an other family member; Hypertension in an other family member; Kidney disease in an other family member.  Review of Systems:    Cannot obtain due to mechanical ventilated state    Physical Exam:  Temp:  [96.9 °F (36.1 °C)-99.5 °F (37.5 °C)] 99.5 °F (37.5 °C)  Heart Rate:  [71-97] 71  Resp:  [12-19] 12  BP: (102-174)/() 129/67  FiO2 (%):  [30 %] 30 %    Intake/Output Summary (Last 24 hours) at 3/6/2022 1138  Last data filed at 3/6/2022 0400  Gross per 24 hour   Intake 1353.57 ml   Output 1 ml   Net 1352.57 ml         03/05/22  1200 03/06/22  0400   Weight: 75.5 kg (166 lb 7.2 oz) 76.1 kg (167 lb 12.3 oz) (bed)     SpO2 Percentage    03/06/22 0500 03/06/22 0639 03/06/22 1027   SpO2: 100% 100% 100%     Body mass index is 30.69 kg/m².  Vent Settings        Resp Rate (Set): 18  Pressure Support (cm H2O): 5 cm H20  FiO2 (%): 30 %  PEEP/CPAP (cm H2O): 5 cm H20  Minute Ventilation (L/min) (Obs): 0  L/min  Resp Rate (Observed) Vent: 84     I:E Ratio (Obs): 1.80:1  PIP Observed (cm H2O): 10 cm H2O  Plateau Pressure (cm H2O): 19 cm H2O   RSBI: 66    Physical Exam  Vitals and nursing note reviewed. Exam conducted with a chaperone present.   Constitutional:       Appearance: She is normal weight. She is ill-appearing.      Interventions: She is intubated.   HENT:      Head: Normocephalic and atraumatic.      Right Ear: External ear normal.      Left Ear: External ear normal.      Nose: Nose normal.   Eyes:      General:         Right eye: No discharge.         Left eye: No discharge.      Conjunctiva/sclera: Conjunctivae normal.   Cardiovascular:      Rate and Rhythm: Normal rate and regular rhythm.      Heart sounds: No murmur heard.  Pulmonary:      Effort: Pulmonary effort is normal. She is intubated.   Abdominal:      General: Abdomen is flat. Bowel sounds are normal. There is no distension.      Palpations: Abdomen is soft.   Musculoskeletal:         General: Normal range of motion.      Cervical back: Normal range of motion and neck supple. No rigidity.      Right lower leg: Edema present.      Left lower leg: Edema present.      Comments: Fistula, left upper extremity   Skin:     General: Skin is warm and dry.      Coloration: Skin is not jaundiced or pale.   Neurological:      Mental Status: She is alert.      Comments: Intubated, nodding appropriately, following commands       Electronically signed by GAEL Carpenter, 3/6/2022, 11:38 CST      Physician Substantive Portion: Medical Decision Making:  Results from last 7 days   Lab Units 03/05/22  1237 03/05/22  1010   WBC 10*3/mm3 8.12 7.17   HEMOGLOBIN g/dL 9.1* 10.7*   PLATELETS 10*3/mm3 151 193     Results from last 7 days   Lab Units 03/06/22  0420 03/06/22  0024 03/05/22  2000   SODIUM mmol/L 134* 132* 132*   POTASSIUM mmol/L 3.8 3.2* 3.0*   CO2 mmol/L 24.0 25.0 26.0   BUN mg/dL 29* 28* 26*   CREATININE mg/dL 3.24* 3.09* 2.91*   MAGNESIUM mg/dL  2.1 2.0 2.1   PHOSPHORUS mg/dL 3.2 3.1 2.8   GLUCOSE mg/dL 66 188* 378*     Results from last 7 days   Lab Units 03/06/22  0310 03/05/22  1024   PH, ARTERIAL pH units 7.421 7.323*   PCO2, ARTERIAL mm Hg 43.3 43.3   PO2 ART mm Hg 110.0* 256.0*   FIO2 % 30 100     Blood Culture   Date Value Ref Range Status   03/05/2022 No growth at 24 hours  Preliminary     Urine Culture   Date Value Ref Range Status   03/05/2022 50,000 CFU/mL Staphylococcus, coagulase negative (A)  Final     Comment:     By laboratory criteria, addititional testing is not indicated and unlikely to produce clinically relevant information.  Coagulase negative staphylococci are common skin contaminants, members of the oral lilly,  and of low virulence; requires clinical correlation.       Lab Results   Component Value Date    PROBNP >70,000.0 (H) 03/05/2022     Recent radiology:   Imaging Results (Last 72 Hours)     Procedure Component Value Units Date/Time    XR Chest 1 View [[id number]] Collected: 03/06/22 0822     Updated: 03/06/22 0826    Narrative:      EXAMINATION: XR CHEST 1 VW- 3/6/2022 8:22 AM CST     HISTORY: Intubated Patient; R41.82-Altered mental status, unspecified.     REPORT: A frontal view of the chest was obtained.     COMPARISON: Chest x-ray 3/5/2022 1002 hours.     The endotracheal tube and NG tube appear in satisfactory position , the  right lung is clear, there are interstitial infiltrates in the left  medial lung extending into the left lung base, with there is also  probable atelectasis. No pneumothorax or pleural effusion is identified.  Vascular stents are noted in the left subclavian region, left upper arm.       Impression:      Interval insertion of an NG tube, in satisfactory position.  The endotracheal tube remains in good position. No change in patchy  interstitial infiltrates in the left lung and streaky atelectasis in the  left lung base.  This report was finalized on 03/06/2022 08:23 by Dr. Farhad Munoz MD.    XR  Abdomen KUB [[id number]] Collected: 03/05/22 1723     Updated: 03/05/22 1726    Narrative:      HISTORY: NG placement     KUB: Frontal view of the lower chest and upper abdomen obtained.     Enteric tube tip is present within the body of the stomach. The  side-port at the GE junction. Left retrocardiac opacities.  Cholecystectomy clips.       Impression:      1. Enteric tube tip in the body the stomach with side-port at the GE  junction.  This report was finalized on 03/05/2022 17:23 by Dr. Desire Castro MD.    CT Cervical Spine Without Contrast [[id number]] Collected: 03/05/22 1047     Updated: 03/05/22 1054    Narrative:      EXAMINATION: CT CERVICAL SPINE WO CONTRAST- 3/5/2022 10:47 AM CST     HISTORY: Neck pain, acute, no red flags; R41.82-Altered mental status,  unspecified. Patient found on floor unresponsive. Unwitnessed fall.     DOSE: 362 mGycm (Automatic exposure control technique was implemented in  an effort to keep the radiation dose as low as possible without  compromising image quality)     REPORT: Spiral CT of the cervical spine was performed without contrast,  reconstructed coronal and sagittal images are also reviewed.     COMPARISON: There are no correlative imaging studies for comparison.     Sagittal images demonstrate straightening of the cervical curvature  without spondylolisthesis. There is moderate degenerative disc disease  at C6-7 with endplate spurring and mild disc space narrowing. No  fracture is identified. The patient is intubated and there is soft  tissue swelling in the hypopharynx. There is normal aeration of the  mastoid air cells. There is mild right-sided neuroforaminal narrowing at  C6-7 related to uncinate spurs. There is mild spinal stenosis at C6-7.  Soft tissue windows show no gross evidence of a traumatic cervical disc  herniation. The visualized upper lungs are clear. There is moderately  heavy calcified plaque within the carotid arteries at the bifurcation.  The        Impression:      No fracture, no acute osseous cervical spine abnormality.  Partial visualization of the endotracheal tube, with soft tissue  swelling in the hypopharynx, not unexpected. Moderate spondylosis at  C6-7, with mild spinal stenosis right-sided neuroforaminal narrowing..              This report was finalized on 03/05/2022 10:51 by Dr. Farhad Munoz MD.    CT Head Without Contrast [[id number]] Collected: 03/05/22 1044     Updated: 03/05/22 1049    Narrative:      EXAMINATION: CT HEAD WO CONTRAST- 3/5/2022 10:44 AM CST     HISTORY: Delirium; R41.82-Altered mental status, unspecified.  Unresponsive.     DOSE: 625 mGycm (Automatic exposure control technique was implemented in  an effort to keep the radiation dose as low as possible without  compromising image quality)     REPORT: Spiral CT of the head was performed without contrast,  reconstructed coronal and sagittal images are also reviewed.     COMPARISON: CT head without contrast 10/5/2021.     No intracranial hemorrhage, mass or mass effect is identified. There is  mildly decreased attenuation in the periventricular white matter tracts  as before, compatible with mild chronic small vessel white matter  ischemic disease. The ventricles and basal cisterns are within normal  limits. Review of bone windows is negative for fractures. There soft  tissue swelling in the posterior nasopharynx which may be related to  recent endotracheal tube insertion.       Impression:      No acute intracranial abnormality. Mild chronic small vessel  white matter ischemic changes appear stable.     This report was finalized on 03/05/2022 10:46 by Dr. Farhad Munoz MD.    XR Chest 1 View [[id number]] Collected: 03/05/22 1035     Updated: 03/05/22 1040    Narrative:      EXAMINATION: XR CHEST 1 VW- 3/5/2022 10:35 AM CST     HISTORY: intubation; R41.82-Altered mental status, unspecified.     REPORT: A frontal view of the chest was obtained.     COMPARISON: Chest x-ray  10/4/2021.     The patient is intubated, the tip of the endotracheal tube appears in  satisfactory position, approximately 2.8 cm superior to the jessie.  There are interstitial infiltrates in the left midlung and lower lung  zones, with associated volume loss. The right lung is clear. There is  mild cardiomegaly, this may be exaggerated by technique and positioning.  The patient is on a backboard. There is a stent in the left subclavian  and axillary region as before. No pneumothorax or pleural effusion is  identified. The upper abdomen appears unremarkable.       Impression:      Satisfactory position of the endotracheal tube. Interstitial  infiltrates are seen throughout most of the left lung without  consolidation. There is mild cardiomegaly, without evidence of overt  CHF.  This report was finalized on 03/05/2022 10:37 by Dr. Farhad Munoz MD.        My radiograph interpretation/independent review of imaging: No infiltrates, ET tube in good position    Echocardiogram  · Left ventricular systolic function is normal. Left ventricular ejection fraction appears to be 61 - 65%.  · Left ventricular wall thickness is consistent with mild concentric hypertrophy.  · Normal right ventricular cavity size and systolic function noted.  · Mild mitral valve stenosis is present.  · Trace tricuspid valve regurgitation is present. Estimated right ventricular systolic pressure from tricuspid regurgitation is normal (<35 mmHg).    EKG: Peaked T waves, left axis deviation, sinus rhythm    Pulmonary Assessment:    1. Acute respiratory failure due to altered mental status  2. Altered mental status, unresponsiveness due to metabolic disorder  3. Stage V chronic kidney disease, on dialysis  4. DKA  5. Extreme hyperglycemia  6. Pseudohyponatremia    Recommend/plan:   · Patient was evaluated this a.m. after we were called late yesterday with consult request, at that time having been somewhat stabilized by acute interventions in the ER,  but requiring intubation and initiation of mechanical ventilation.  · Spontaneous breathing trial, discussed with respiratory therapy.  · Patient completed successfully and was extubated.  · I went back to revisit her, in no distress on low-flow nasal oxygen.  · Note of antibiotic therapy started per primary service.  This is reasonable considering the Rachel around her presentation, although it does not appear to me that she has a lower respiratory tract infection.  Consider early discontinuation in absence of any positive cultures or other data to indicate need to continue after a couple of days  · No additional pulmonary plans at this time.  We will be available if needed but otherwise defer management of diabetes and renal failure to the others.    Thank you for this consult.  This visit was performed by both a physician and an [facility] RN.  I personally evaluated and examined the patient.  I performed all aspects of the medical decision making as documented.  Electronically signed by Zach Powers MD, 3/6/2022, 13:35 CST             Electronically signed by Zach Powers MD at 03/06/22 1343     Surjit Avitia MD at 03/05/22 1447      Consult Orders    1. Inpatient Nephrology Consult [[id number]] ordered by Heath Prado MD at 03/05/22 1220               Nephrology ([facility]) Consult Note      Patient:  Ena Upton  YOB: 1970  Date of Service: 3/5/2022  MRN: [id number]   Acct: [id number]   Primary Care Physician: Yaron Wiggins APRN  Advance Directive:   There are no questions and answers to display.     Admit Date: 3/5/2022       Hospital Day: 0  Referring Provider: No Known Provider      Patient personally seen and examined.  Complete chart including Consults, Notes, Operative Reports, Labs, Cardiology, and Radiology studies reviewed as able.        Subjective:  Ena Upton is a 52 y.o. female for whom we were  consulted for evaluation and treatment of end stage renal disease. Maintenance hemodialysis Tuesday Thursday Saturday at [facility]. Unknown last time she went to HD.  History of type 2 diabetes, hypertension, hypothyroidism.  Frequent hospitalizations due to noncompliance with dialysis and her other medical treatments including antihypertensives.  She was brought in by EMS found down at home.  Blood glucose was over 900 and the patient was on the ventilator and unable to participate in history physical examination.  Patient was seen on tolerating dialysis.  Events reviewed with nursing at the bedside as well as with Dr. Prado.  Blood pressure trends improved with Cardene infusion.    Dialysis   Pt was seen on RRT  Modality: Hemodialysis  Access: Arterial Venous Fistula  Location: left upper  QB: 450  QD: 700  UF: 1170      Allergies:  Penicillins, Lamisil [terbinafine], Reglan [metoclopramide], and Stadol [butorphanol]    Home Meds:  Medications Prior to Admission   Medication Sig Dispense Refill Last Dose   • acetaminophen (TYLENOL) 325 MG tablet Take 2 tablets by mouth Every 6 (Six) Hours As Needed for Mild Pain .      • albuterol (PROVENTIL HFA;VENTOLIN HFA) 108 (90 BASE) MCG/ACT inhaler Inhale 2 puffs Every 4 (Four) Hours As Needed for wheezing.      • carvedilol (COREG) 25 MG tablet Take 1 tablet by mouth 2 (Two) Times a Day With Meals. 60 tablet 3    • cloNIDine (CATAPRES) 0.3 MG tablet Take 1 tablet by mouth Every 8 (Eight) Hours. 90 tablet 3    • DULoxetine (CYMBALTA) 60 MG capsule Take 60 mg by mouth daily.      • famotidine (PEPCID) 20 MG tablet Take 20 mg by mouth 2 (Two) Times a Day.      • hydrALAZINE (APRESOLINE) 50 MG tablet Take 1.5 tablets by mouth 3 (Three) Times a Day. 120 tablet 3    • insulin aspart (novoLOG) 100 UNIT/ML injection Inject 10 Units under the skin into the appropriate area as directed 3 (Three) Times a Day Before Meals.      • Insulin Glargine (BASAGLAR KWIKPEN)  100 UNIT/ML injection pen Inject 40 Units under the skin into the appropriate area as directed Every Night.      • isosorbide mononitrate (IMDUR) 60 MG 24 hr tablet Take 1 tablet by mouth Daily.      • levothyroxine (SYNTHROID, LEVOTHROID) 50 MCG tablet Take 1 tablet by mouth Daily. 30 tablet 3    • lisinopril (PRINIVIL,ZESTRIL) 20 MG tablet Take 20 mg by mouth 2 (Two) Times a Day.      • nicotine (NICODERM CQ) 21 MG/24HR patch Place 1 patch on the skin as directed by provider Daily.      • NIFEdipine XL (PROCARDIA XL) 60 MG 24 hr tablet Take 60 mg by mouth 2 (two) times a day.      • rOPINIRole (REQUIP) 4 MG tablet Take 4 mg by mouth Every Night.      • simvastatin (ZOCOR) 20 MG tablet Take 40 mg by mouth Daily.      • spironolactone (ALDACTONE) 25 MG tablet Take 1 tablet by mouth Daily.          Medicines:  Current Facility-Administered Medications   Medication Dose Route Frequency Provider Last Rate Last Admin   • [START ON 3/6/2022] cefTRIAXone (ROCEPHIN) 1 g/100 mL 0.9% NS (MBP)  1 g Intravenous Q24H Heath Prado MD       • chlorhexidine (PERIDEX) 0.12 % solution 15 mL  15 mL Mouth/Throat Q12H Heath Prado MD       • dextrose (D50W) (25 g/50 mL) IV injection 12.5 g  12.5 g Intravenous PRN Heath Prado MD       • dextrose 5 % and sodium chloride 0.45 % infusion  50 mL/hr Intravenous Continuous PRN Heath Prado MD       • dextrose 5 % and sodium chloride 0.45 % with KCl 20 mEq/L infusion  50 mL/hr Intravenous Continuous PRN Heath Prado MD       • dextrose 5 % and sodium chloride 0.9 % infusion  50 mL/hr Intravenous Continuous PRN Heath Prado MD       • dextrose 5 % and sodium chloride 0.9 % with KCl 20 mEq/L infusion  50 mL/hr Intravenous Continuous PRN Heath Prado MD       • enoxaparin (LOVENOX) syringe 30 mg  30 mg Subcutaneous Q24H Heath Prado MD   30 mg at 03/05/22 1303   • famotidine (PEPCID) injection 20 mg  20 mg Intravenous Daily  Heath Prado MD   20 mg at 03/05/22 1304   • insulin regular (HumuLIN R,NovoLIN R) 100 Units in sodium chloride 0.9 % 100 mL (1 Units/mL) infusion  8 Units/hr Intravenous Titrated Heath Prado MD       • ipratropium-albuterol (DUO-NEB) nebulizer solution 3 mL  3 mL Nebulization 4x Daily - RT Heath Prado MD       • LORazepam (ATIVAN) injection 1 mg  1 mg Intravenous Q4H PRN Heath Prado MD   1 mg at 03/05/22 1303   • Morphine sulfate (PF) injection 2 mg  2 mg Intravenous Q4H PRN Heath Prado MD   2 mg at 03/05/22 1303   • niCARdipine (CARDENE) 25 mg in 250 mL NS infusion  5-15 mg/hr Intravenous Titrated Heath Prado MD   Held at 03/05/22 1434   • ondansetron (ZOFRAN) injection 4 mg  4 mg Intravenous Q6H PRN Heath Prado MD       • propofol (DIPRIVAN) infusion 10 mg/mL 100 mL  5-50 mcg/kg/min Intravenous Titrated Heath Prado MD 24.2 mL/hr at 03/05/22 1302 50 mcg/kg/min at 03/05/22 1302   • sodium chloride 0.45 % infusion  50 mL/hr Intravenous Continuous PRN Heath Prado MD       • sodium chloride 0.45 % with KCl 20 mEq/L infusion  50 mL/hr Intravenous Continuous PRN Heath Prado MD       • sodium chloride 0.9 % flush 10 mL  10 mL Intravenous Q12H Heath Prado MD       • sodium chloride 0.9 % flush 10 mL  10 mL Intravenous PRN Heath Prado MD       • sodium chloride 0.9 % flush 10 mL  10 mL Intravenous Once PRN Heath Prado MD       • sodium chloride 0.9 % flush 10 mL  10 mL Intravenous Q12H Heath Prado MD   10 mL at 03/05/22 1303   • sodium chloride 0.9 % flush 10 mL  10 mL Intravenous PRN Heath Prado MD       • sodium chloride 0.9 % infusion  50 mL/hr Intravenous Continuous PRN Heath Prado MD       • sodium chloride 0.9 % infusion  10 mL/hr Intravenous Continuous PRN Heath Prado MD       • sodium chloride 0.9 % with KCl 20 mEq/L infusion  50 mL/hr Intravenous  "Continuous Heath Melton MD           Past Medical History:  Past Medical History:   Diagnosis Date   • Anxiety    • Arthritis    • Chronic kidney disease     STAGE V RENAL FAILURE   • Depression    • Diabetes mellitus (HCC)    • HTN (hypertension)    • Hypothyroidism    • Obesity    • Tremors of nervous system        Past Surgical History:  Past Surgical History:   Procedure Laterality Date   • ARTERIOVENOUS FISTULA     • BREAST BIOPSY     • CARPAL TUNNEL RELEASE     • CATH LAB PROCEDURE     • CHOLECYSTECTOMY     • TUBAL ABDOMINAL LIGATION         Family History  Family History   Problem Relation Age of Onset   • Cancer Other    • Hypertension Other    • Kidney disease Other    • Heart disease Other    • Diabetes Other    • Diabetes Mother    • Diabetes Maternal Aunt    • Diabetes Maternal Grandmother        Social History  Social History     Socioeconomic History   • Marital status:    Tobacco Use   • Smoking status: Current Every Day Smoker     Packs/day: 0.50   • Smokeless tobacco: Never Used   • Tobacco comment: CUTTING BACK AND TRYING TO STOP SMOKING   Substance and Sexual Activity   • Alcohol use: No   • Drug use: No   • Sexual activity: Defer         Review of Systems:  History obtained from unobtainable from patient due to mental status        Objective:  Patient Vitals for the past 24 hrs:   BP Temp Temp src Pulse Resp SpO2 Height Weight   03/05/22 1430 105/66 -- -- 76 -- 100 % -- --   03/05/22 1415 110/68 -- -- 76 -- 100 % -- --   03/05/22 1400 119/68 -- -- 75 -- 100 % -- --   03/05/22 1345 128/63 -- -- 77 -- 100 % -- --   03/05/22 1330 127/64 -- -- 77 -- 98 % -- --   03/05/22 1315 125/74 -- -- 77 -- 100 % -- --   03/05/22 1300 134/67 -- -- 82 -- 100 % -- --   03/05/22 1245 138/69 -- -- 86 -- 100 % -- --   03/05/22 1230 140/70 -- -- 87 -- 100 % -- --   03/05/22 1215 (!) 133/105 -- -- 88 -- 100 % -- --   03/05/22 1200 -- 97.8 °F (36.6 °C) Axillary -- -- -- 157.5 cm (62\") 75.5 kg (166 lb " "7.2 oz)   03/05/22 1149 174/96 97.1 °F (36.2 °C) Rectal 97 18 100 % -- --   03/05/22 1134 (!) 195/83 -- -- 94 20 100 % -- --   03/05/22 1124 (!) 201/87 -- -- 96 20 99 % -- --   03/05/22 1109 (!) 192/88 -- -- 97 18 100 % -- --   03/05/22 1047 (!) 190/92 -- -- 92 19 99 % -- --   03/05/22 1038 (!) 232/92 -- -- 100 18 99 % -- --   03/05/22 1018 -- -- -- 97 18 100 % -- --   03/05/22 1017 -- 98.4 °F (36.9 °C) Rectal -- -- -- -- --   03/05/22 1007 (!) 257/120 -- -- 100 16 100 % 157.5 cm (62\") 80.7 kg (178 lb)     No intake or output data in the 24 hours ending 03/05/22 1447  General: vent via ET and unable to participate in the history and physical examination  Chest:  clear to auscultation bilaterally without respiratory distress  CVS: regular rate and rhythm  Abdominal: soft, nontender, positive bowel sounds  Extremities: no cyanosis or edema  Skin: warm and dry without rash      Labs:  Results from last 7 days   Lab Units 03/05/22  1237 03/05/22  1010   WBC 10*3/mm3 8.12 7.17   HEMOGLOBIN g/dL 9.1* 10.7*   HEMATOCRIT % 28.7* 33.7*   PLATELETS 10*3/mm3 151 193         Results from last 7 days   Lab Units 03/05/22  1237 03/05/22  1042   SODIUM mmol/L 120* 119*   POTASSIUM mmol/L 3.9 4.3   CHLORIDE mmol/L 83* 82*   CO2 mmol/L 21.0* 21.0*   BUN mg/dL 51* 49*   CREATININE mg/dL 4.80* 4.66*   CALCIUM mg/dL 8.4* 8.4*   EGFR mL/min/1.73 10.3* 10.6*   BILIRUBIN mg/dL  --  0.4   ALK PHOS U/L  --  138*   ALT (SGPT) U/L  --  23   AST (SGOT) U/L  --  38*   GLUCOSE mg/dL 921* 972*       Radiology:   Imaging Results (Last 72 Hours)     Procedure Component Value Units Date/Time    CT Cervical Spine Without Contrast [[id number]] Collected: 03/05/22 1047     Updated: 03/05/22 1054    Narrative:      EXAMINATION: CT CERVICAL SPINE WO CONTRAST- 3/5/2022 10:47 AM CST     HISTORY: Neck pain, acute, no red flags; R41.82-Altered mental status,  unspecified. Patient found on floor unresponsive. Unwitnessed fall.     DOSE: 362 mGycm (Automatic " exposure control technique was implemented in  an effort to keep the radiation dose as low as possible without  compromising image quality)     REPORT: Spiral CT of the cervical spine was performed without contrast,  reconstructed coronal and sagittal images are also reviewed.     COMPARISON: There are no correlative imaging studies for comparison.     Sagittal images demonstrate straightening of the cervical curvature  without spondylolisthesis. There is moderate degenerative disc disease  at C6-7 with endplate spurring and mild disc space narrowing. No  fracture is identified. The patient is intubated and there is soft  tissue swelling in the hypopharynx. There is normal aeration of the  mastoid air cells. There is mild right-sided neuroforaminal narrowing at  C6-7 related to uncinate spurs. There is mild spinal stenosis at C6-7.  Soft tissue windows show no gross evidence of a traumatic cervical disc  herniation. The visualized upper lungs are clear. There is moderately  heavy calcified plaque within the carotid arteries at the bifurcation.  The       Impression:      No fracture, no acute osseous cervical spine abnormality.  Partial visualization of the endotracheal tube, with soft tissue  swelling in the hypopharynx, not unexpected. Moderate spondylosis at  C6-7, with mild spinal stenosis right-sided neuroforaminal narrowing..              This report was finalized on 03/05/2022 10:51 by Dr. Farhad Munoz MD.    CT Head Without Contrast [[id number]] Collected: 03/05/22 1044     Updated: 03/05/22 1049    Narrative:      EXAMINATION: CT HEAD WO CONTRAST- 3/5/2022 10:44 AM CST     HISTORY: Delirium; R41.82-Altered mental status, unspecified.  Unresponsive.     DOSE: 625 mGycm (Automatic exposure control technique was implemented in  an effort to keep the radiation dose as low as possible without  compromising image quality)     REPORT: Spiral CT of the head was performed without contrast,  reconstructed coronal  and sagittal images are also reviewed.     COMPARISON: CT head without contrast 10/5/2021.     No intracranial hemorrhage, mass or mass effect is identified. There is  mildly decreased attenuation in the periventricular white matter tracts  as before, compatible with mild chronic small vessel white matter  ischemic disease. The ventricles and basal cisterns are within normal  limits. Review of bone windows is negative for fractures. There soft  tissue swelling in the posterior nasopharynx which may be related to  recent endotracheal tube insertion.       Impression:      No acute intracranial abnormality. Mild chronic small vessel  white matter ischemic changes appear stable.     This report was finalized on 03/05/2022 10:46 by Dr. Farhad Munoz MD.    XR Chest 1 View [[id number]] Collected: 03/05/22 1035     Updated: 03/05/22 1040    Narrative:      EXAMINATION: XR CHEST 1 VW- 3/5/2022 10:35 AM CST     HISTORY: intubation; R41.82-Altered mental status, unspecified.     REPORT: A frontal view of the chest was obtained.     COMPARISON: Chest x-ray 10/4/2021.     The patient is intubated, the tip of the endotracheal tube appears in  satisfactory position, approximately 2.8 cm superior to the jessie.  There are interstitial infiltrates in the left midlung and lower lung  zones, with associated volume loss. The right lung is clear. There is  mild cardiomegaly, this may be exaggerated by technique and positioning.  The patient is on a backboard. There is a stent in the left subclavian  and axillary region as before. No pneumothorax or pleural effusion is  identified. The upper abdomen appears unremarkable.       Impression:      Satisfactory position of the endotracheal tube. Interstitial  infiltrates are seen throughout most of the left lung without  consolidation. There is mild cardiomegaly, without evidence of overt  CHF.  This report was finalized on 03/05/2022 10:37 by Dr. Farhad Munoz MD.          Culture:  No  results found for: BLOODCX, URINECX, WOUNDCX, MRSACX, RESPCX, STOOLCX      Assessment   End-stage renal disease  Diabetes type 2 with diabetic nephropathy  Hypertension  Hyperosmolar hyperglycemic on admission  Hyponatremia due to volume overload/profound hyperglycemia  Metabolic acidosis  Acute metabolic encephalopathy  Anemia in chronic kidney disease  Secondary hyperparathyroidism  Hypothyroidism     Plan:  Hemodialysis Tuesday Thursday Saturday per routine scheduling  Monitor labs  Ultrafiltration as tolerated with dialysis 3/5  Continue to adjust bp meds as needed including continuous infusion  Discussed with nursing, Dr. Prado            Thank you for the consult, we appreciate the opportunity to provide care to your patients.  Feel free to contact me if I can be of any further assistance.      Surjit Avitia MD  3/5/2022  14:47 CST      Electronically signed by Surjit Avitia MD at 03/05/22 5138

## 2022-03-08 NOTE — NURSING NOTE
Notified by dialysis RN that patient's BG is 45 per their glucometer. D50 IV brought to dialysis unit and administer. DEDRA ANGEL in unit, orders noted.

## 2022-03-08 NOTE — PROGRESS NOTES
"    [facility] Medicine Services  INPATIENT PROGRESS NOTE    Patient Name: Ena Upton  Date of Admission: 3/5/2022  Today's Date: 03/08/22  Length of Stay: 3  Primary Care Physician: Yaron Wiggins APRN    Subjective   Chief Complaint: Weakness.   HPI   Blood sugar got up to 257 last night.  She received 10 units of Levemir.  Blood sugar dropped all the way to 28 this morning.  Started back on dextrose fluids.  We will completely discontinue long-acting insulin and continue to monitor.  She says that her blood sugar is always \"all over the place.\"    Blood pressure still elevated at times, but much better than it has been.    Seen on dialysis.    No fever in over 24 hours.    She states that she broke her left femur a few months ago and was told that it was nonoperative.  I do not have any plain films here with regards to this.  These were done at [facility].  These were done on 1/22.  She had an acute, nondisplaced fracture through the distal femoral diaphysis.  She was told that this was nonoperative.  However, it still causes her pain.  She has not followed back up with orthopedics in the office regarding this.  We can obtain new films here for this complaint.    Review of Systems   All pertinent negatives and positives are as above. All other systems have been reviewed and are negative unless otherwise stated.     Objective    Temp:  [97.6 °F (36.4 °C)-98.9 °F (37.2 °C)] 97.6 °F (36.4 °C)  Heart Rate:  [71-83] 75  Resp:  [11-16] 16  BP: (108-163)/(61-72) 163/65  Physical Exam  Constitutional:       Appearance: She is well-developed. She is ill-appearing (chronically).      Comments: Up in bed.  No distress.  No family present.  Discussed with her nurse, Sariah.    HENT:      Head: Normocephalic and atraumatic.   Eyes:      Conjunctiva/sclera: Conjunctivae normal.      Pupils: Pupils are equal, round, and reactive to light.   Neck:      Vascular: No JVD.   Cardiovascular:     "  Rate and Rhythm: Normal rate and regular rhythm.      Heart sounds: Normal heart sounds.      Comments: Sinus rhythm in the 70s.  Pulmonary:      Effort: Pulmonary effort is normal. No respiratory distress.      Breath sounds: Normal breath sounds.      Comments: On room air.   Chest:      Chest wall: No tenderness.   Abdominal:      General: Bowel sounds are normal. There is no distension.      Palpations: Abdomen is soft.      Tenderness: There is no abdominal tenderness.   Musculoskeletal:         General: No tenderness or deformity. Normal range of motion.      Cervical back: Neck supple.   Skin:     General: Skin is warm and dry.      Findings: No rash.   Neurological:      General: No focal deficit present.      Mental Status: She is alert and oriented to person, place, and time.      Cranial Nerves: No cranial nerve deficit.      Motor: Weakness present. No abnormal muscle tone.      Deep Tendon Reflexes: Reflexes normal.   Psychiatric:      Comments: More conversational today.        Results Review:  I have reviewed the labs, radiology results, and diagnostic studies.    Laboratory Data:   Results from last 7 days   Lab Units 03/07/22  0426 03/05/22  1237 03/05/22  1010   WBC 10*3/mm3 7.43 8.12 7.17   HEMOGLOBIN g/dL 8.3* 9.1* 10.7*   HEMATOCRIT % 26.3* 28.7* 33.7*   PLATELETS 10*3/mm3 156 151 193     Results from last 7 days   Lab Units 03/07/22  0426 03/06/22  1840 03/06/22  1357 03/06/22  0420 03/06/22  0024 03/05/22  1237 03/05/22  1042   SODIUM mmol/L 128* 130* 128*   < > 132*   < > 119*   POTASSIUM mmol/L 5.1 4.6 6.6*   < > 3.2*   < > 4.3   CHLORIDE mmol/L 93* 94* 95*   < > 96*   < > 82*   CO2 mmol/L 22.0 23.0 20.0*   < > 25.0   < > 21.0*   BUN mg/dL 36* 33* 33*   < > 28*   < > 49*   CREATININE mg/dL 4.01* 3.63* 3.52*   < > 3.09*   < > 4.66*   CALCIUM mg/dL 8.1* 8.6 8.2*   < > 8.2*   < > 8.4*   BILIRUBIN mg/dL 0.4  --   --   --  0.3  --  0.4   ALK PHOS U/L 103  --   --   --  85  --  138*   ALT (SGPT)  U/L 13  --   --   --  15  --  23   AST (SGOT) U/L 23  --   --   --  17  --  38*   GLUCOSE mg/dL 117* 95 144*   < > 188*   < > 972*    < > = values in this interval not displayed.     Culture Data:   Blood Culture   Date Value Ref Range Status   03/05/2022 Staphylococcus, coagulase negative (C)  Preliminary   03/05/2022 No growth at 24 hours  Preliminary     Urine Culture   Date Value Ref Range Status   03/05/2022 50,000 CFU/mL Staphylococcus, coagulase negative (A)  Final     Comment:     By laboratory criteria, addititional testing is not indicated and unlikely to produce clinically relevant information.  Coagulase negative staphylococci are common skin contaminants, members of the oral lilly,  and of low virulence; requires clinical correlation.       I have reviewed the patient's current medications.     Assessment/Plan     Active Hospital Problems    Diagnosis    • **Metabolic encephalopathy    • HHNC (hyperglycemic hyperosmolar nonketotic coma) (Formerly [facility])    • Uncontrolled type 2 diabetes mellitus with hyperglycemia ([facility])    • Hypertensive emergency    • ESRD on hemodialysis ([facility])    • Hyponatremia    • Hyperkalemia    • Acute respiratory failure secondary to an inability to protect her airway    • Tobacco abuse    • Anemia, chronic disease    • Elevated troponin due to ESRD and hypertensive urgency    • Hypothyroidism      Plan:  The patient was admitted on 3/5 by Dr. Prado.  She was found unresponsive.  EMS arrived on the scene and placed an LMA.  She was ultimately intubated in the emergency department.  She had a blood sugar of greater than 900 on arrival and was also severely hypertensive.  She has end-stage renal disease and does dialysis on Tuesday, Thursday, and Saturday.    She required ventilatory support in the emergency department secondary to altered mental status and inability to protect her airway.  She was extubated on Sunday, 3/6.  She has been evaluated by pulmonary.  She is currently on room  air.    Nephrology consulted to assist with dialysis.  She received dialysis on Saturday in the intensive care unit setting.  She required a medical treatment for hyperkalemia on the afternoon of 3/6 and also received a dose of Lokelma.  Potassium level 5.1 today.    She has significant problems with hypertension and is on multiple medications for this.  Dr. Prado had not been able to resume all of them.  She has previously required Cardene. I resumed Imdur, Procardia, and pushed her carvedilol and hydralazine back to her typical doses on the morning of 3/7 and then resumed her clonidine that afternoon.  Lisinopril and spironolactone on hold secondary to recent problems with hyperkalemia.  These should be able to be resumed soon as well if needed.     She had 1 bottle positive for Staphylococcus that was coagulase-negative.  Stopped vancomycin on 3/7. Consider this to be a contaminant.  She has also recently been on ceftriaxone.  Plan to stop this and monitor her as well.    She was initially on an insulin drip.  This was removed on 3/6.  She was reordered long-acting insulin and sliding scale insulin.  However, she has had some problems with hypoglycemia and was recently placed on dextrose fluids.  She was hyperglycemic yesterday evening and got 10 units of Levemir and was again hypoglycemic this morning.  Completely stop long-acting insulin for now.  Her most recent hemoglobin A1c was 12.4 in January 2022.    Offered a nicotine patch and counseled for tobacco cessation.    Reconciled and resumed other home medications as appropriate.    Speech therapy cleared for regular foods with thin liquids on 3/7.    Pepcid for peptic ulcer prophylaxis.    Lovenox for DVT prophylaxis.    Transferred to the floor on 3/7.    Discharge Planning: I expect the patient to be discharged home in 1-3 days.    Electronically signed by Thomas Oliver DO, 03/08/22, 13:29 CST.

## 2022-03-08 NOTE — NURSING NOTE
Patient BG 32 recheck/verified 42. Pt is awake and alert. Pt drinking 4 oz of orange juice and breakfast tray brought to BS. 0755 Pt with diaphoresis and increased drowsiness. Rechecked BG after 4oz of orange juice. BG 28. See MAR, repeat . Pt awake, oriented and feeding self breakfast.

## 2022-03-08 NOTE — PLAN OF CARE
Goal Outcome Evaluation:           Progress: no change  Outcome Evaluation: Patient c/o of pain x1. See Mar. More alert and oriented. BG monitored and SS given. See Mar. Safety maintained.

## 2022-03-08 NOTE — PROGRESS NOTES
Nephrology ([facility]) Progress Note      Patient:  Ena Upton  YOB: 1970  Date of Service: 3/8/2022  MRN: [id number]   Acct: [id number]   Primary Care Physician: Yaron Wiggins APRN  Advance Directive:   Code Status and Medical Interventions:   Ordered at: 03/07/22 0841     Level Of Support Discussed With:    Patient     Code Status (Patient has no pulse and is not breathing):    CPR (Attempt to Resuscitate)     Medical Interventions (Patient has pulse or is breathing):    Full Support     Admit Date: 3/5/2022       Hospital Day: 3  Referring Provider: No Known Provider      Patient personally seen and examined.  Complete chart including Consults, Notes, Operative Reports, Labs, Cardiology, and Radiology studies reviewed as able.        Subjective:  Ena Upton is a 52 y.o. female for whom we were consulted for evaluation and treatment of end stage renal disease. Maintenance hemodialysis on Tuesday Thursday Saturday at [facility].  Frequently skips her dialysis treatments. History of type 2 diabetes, hypertension, hypothyroid. Found down at home on 3/05 and brought to ER via EMS. Initial blood glucose over 900 and patient required intubation. Tolerated HD well on 3/05. Was on Cardene drip due to hypertension. Extubated on 3/06.  Following IV and PO potassium supplementation, she developed hyperkalemia of 6.6. This was managed medically. Sent to medical floor on 3/07.    Today is seen at start of dialysis treatment. Blood glucose currently 45 and amp of D50 currently being given. Nursing staff reports patient's glucose was 28 earlier this AM and she was given D50 and also ate breakfast, glucose had improved to 131 prior to coming to dialysis unit.  Patient is awake and alert. No new complaints  Dialysis   Pt was seen on RRT. Hypoglycemia as noted above but otherwise tolerating well  Modality: Hemodialysis  Access: Arterial Venous Fistula  Location:  left upper  QB: 450  QD: 600  UF: 2000 as tolerated        Allergies:  Penicillins, Lamisil [terbinafine], Reglan [metoclopramide], and Stadol [butorphanol]    Home Meds:  Medications Prior to Admission   Medication Sig Dispense Refill Last Dose   • acetaminophen (TYLENOL) 325 MG tablet Take 2 tablets by mouth Every 6 (Six) Hours As Needed for Mild Pain .   Unknown at Unknown time   • albuterol (PROVENTIL HFA;VENTOLIN HFA) 108 (90 BASE) MCG/ACT inhaler Inhale 2 puffs Every 4 (Four) Hours As Needed for wheezing.   Unknown at Unknown time   • cefuroxime (CEFTIN) 250 MG tablet Take 250 mg by mouth Daily.      • cloNIDine (CATAPRES-TTS) 0.1 MG/24HR patch Place 1 patch on the skin as directed by provider 1 (One) Time Per Week.   Unknown at Unknown time   • DULoxetine (CYMBALTA) 60 MG capsule Take 60 mg by mouth daily.   Unknown at Unknown time   • famotidine (PEPCID) 20 MG tablet Take 20 mg by mouth Daily.   Unknown at Unknown time   • hydrALAZINE (APRESOLINE) 50 MG tablet Take 50 mg by mouth 3 (Three) Times a Day.   Unknown at Unknown time   • insulin aspart (novoLOG FLEXPEN) 100 UNIT/ML solution pen-injector sc pen Inject 14 Units under the skin into the appropriate area as directed 3 (Three) Times a Day With Meals.   Unknown at Unknown time   • Insulin Glargine (BASAGLAR KWIKPEN) 100 UNIT/ML injection pen Inject 70 Units under the skin into the appropriate area as directed Every Night.   Unknown at Unknown time   • isosorbide mononitrate (IMDUR) 120 MG 24 hr tablet Take 120 mg by mouth Daily.      • lactulose (CHRONULAC) 10 GM/15ML solution Take 10 g by mouth 3 (Three) Times a Day With Meals. For constipation - HOLD for loose stools      • levothyroxine (SYNTHROID, LEVOTHROID) 200 MCG tablet Take 200 mcg by mouth Daily.   Unknown at Unknown time   • minoxidil (LONITEN) 2.5 MG tablet Take 2.5 mg by mouth Daily.   Unknown at Unknown time   • ondansetron (ZOFRAN) 4 MG tablet Take 4 mg by mouth Every 8 (Eight) Hours As  Needed for Nausea or Vomiting.   Unknown at Unknown time   • senna (senna) 8.6 MG tablet Take 1 tablet by mouth Daily.   Unknown   • simvastatin (ZOCOR) 20 MG tablet Take 20 mg by mouth Daily.   Unknown at Unknown time   • vitamin D (ERGOCALCIFEROL) 1.25 MG (15077 UT) capsule capsule Take 50,000 Units by mouth Every 14 (Fourteen) Days.   Unknown at Unknown time       Medicines:  Current Facility-Administered Medications   Medication Dose Route Frequency Provider Last Rate Last Admin   • acetaminophen (TYLENOL) tablet 650 mg  650 mg Oral Q4H PRN Thomas Oliver DO   650 mg at 03/07/22 1013    Or   • acetaminophen (TYLENOL) 160 MG/5ML solution 650 mg  650 mg Oral Q4H PRN Thomas Oliver DO        Or   • acetaminophen (TYLENOL) suppository 650 mg  650 mg Rectal Q4H PRN Thomas Oliver DO       • atorvastatin (LIPITOR) tablet 20 mg  20 mg Oral Nightly Thomas Oliver DO   20 mg at 03/07/22 2153   • carvedilol (COREG) tablet 25 mg  25 mg Oral BID With Meals Thomas Oliver DO   25 mg at 03/08/22 0805   • cefTRIAXone (ROCEPHIN) 1 g/100 mL 0.9% NS (MBP)  1 g Intravenous Q24H Heath Prado MD   1 g at 03/07/22 1107   • cloNIDine (CATAPRES) tablet 0.3 mg  0.3 mg Oral Q8H Thomas Oliver DO   0.3 mg at 03/08/22 0555   • dextrose (D50W) (25 g/50 mL) IV injection 12.5 g  12.5 g Intravenous PRN Thomas Oliver DO   12.5 g at 03/06/22 2212   • dextrose (D50W) (25 g/50 mL) IV injection 25 g  25 g Intravenous Q15 Min PRN Thomas Oliver DO   25 g at 03/08/22 0758   • dextrose (GLUTOSE) oral gel 15 g  15 g Oral Q15 Min PRN Thomas Oliver DO       • dextrose 10 % infusion  50 mL/hr Intravenous Continuous Delmar Salazar, GAEL       • DULoxetine (CYMBALTA) DR capsule 60 mg  60 mg Oral Daily Thomas Oliver DO   60 mg at 03/08/22 0805   • enoxaparin (LOVENOX) syringe 30 mg  30 mg Subcutaneous Q24H Thomas Oliver DO   30 mg at 03/07/22 1418   • famotidine (PEPCID) tablet 20 mg  20 mg Oral Daily Thomas Oliver,  DO   20 mg at 03/08/22 0805   • glucagon (human recombinant) (GLUCAGEN DIAGNOSTIC) injection 1 mg  1 mg Intramuscular Q15 Min PRN Thomas Oliver, DO       • hydrALAZINE (APRESOLINE) tablet 75 mg  75 mg Oral Q8H Thomas Oliver, DO   75 mg at 03/08/22 0555   • insulin detemir (LEVEMIR) injection 10 Units  10 Units Subcutaneous Nightly Thomas Oliver DO   10 Units at 03/07/22 2203   • insulin lispro (humaLOG) injection 2-7 Units  2-7 Units Subcutaneous Q4H Thomas Oliver, DO   3 Units at 03/08/22 0107   • ipratropium-albuterol (DUO-NEB) nebulizer solution 3 mL  3 mL Nebulization 4x Daily - RT Thomas Oliver DO   3 mL at 03/08/22 0614   • isosorbide mononitrate (IMDUR) 24 hr tablet 60 mg  60 mg Oral Q24H Thomas Oliver, DO   60 mg at 03/08/22 0805   • labetalol (NORMODYNE,TRANDATE) injection 10 mg  10 mg Intravenous Q4H PRN Thomas Oliver, DO   10 mg at 03/07/22 0411   • levothyroxine (SYNTHROID, LEVOTHROID) tablet 50 mcg  50 mcg Oral Q AM Thomas Oliver, DO   50 mcg at 03/08/22 0555   • LORazepam (ATIVAN) injection 1 mg  1 mg Intravenous Q4H PRN Thomas Oliver, DO   1 mg at 03/05/22 1303   • Morphine sulfate (PF) injection 2 mg  2 mg Intravenous Q4H PRN Thomas Oliver, DO   2 mg at 03/07/22 2202   • nicotine (NICODERM CQ) 14 MG/24HR patch 1 patch  1 patch Transdermal Q24H Thomas Oliver DO       • NIFEdipine XL (PROCARDIA XL) 24 hr tablet 60 mg  60 mg Oral Q24H Thomas Oliver, DO   60 mg at 03/08/22 0805   • ondansetron (ZOFRAN) injection 4 mg  4 mg Intravenous Q6H PRN Thomas Oliver, DO       • sodium chloride 0.45 % infusion  50 mL/hr Intravenous Continuous PRN Thomas Oliver, DO       • sodium chloride 0.9 % flush 10 mL  10 mL Intravenous Q12H Thomas Oliver DO   10 mL at 03/08/22 0805   • sodium chloride 0.9 % flush 10 mL  10 mL Intravenous PRN Thomas Oliver DO       • sodium chloride 0.9 % flush 10 mL  10 mL Intravenous Once PRN Thomas Oliver, DO       • sodium chloride 0.9 % flush 10 mL   10 mL Intravenous Q12H Thomas Oliver DO   10 mL at 03/08/22 0805   • sodium chloride 0.9 % flush 10 mL  10 mL Intravenous PRN Thomas Oliver DO       • sodium chloride 0.9 % infusion  50 mL/hr Intravenous Continuous PRN Thomas Oliver DO       • sodium chloride 0.9 % infusion  10 mL/hr Intravenous Continuous PRN Thomas Oliver, DO           Past Medical History:  Past Medical History:   Diagnosis Date   • Anxiety    • Arthritis    • Chronic kidney disease     STAGE V RENAL FAILURE   • Depression    • Diabetes mellitus (HCC)    • HTN (hypertension)    • Hypothyroidism    • Obesity    • Tremors of nervous system        Past Surgical History:  Past Surgical History:   Procedure Laterality Date   • ARTERIOVENOUS FISTULA     • BREAST BIOPSY     • CARPAL TUNNEL RELEASE     • CATH LAB PROCEDURE     • CHOLECYSTECTOMY     • TUBAL ABDOMINAL LIGATION         Family History  Family History   Problem Relation Age of Onset   • Cancer Other    • Hypertension Other    • Kidney disease Other    • Heart disease Other    • Diabetes Other    • Diabetes Mother    • Diabetes Maternal Aunt    • Diabetes Maternal Grandmother        Social History  Social History     Socioeconomic History   • Marital status:    Tobacco Use   • Smoking status: Current Every Day Smoker     Packs/day: 0.50   • Smokeless tobacco: Never Used   • Tobacco comment: CUTTING BACK AND TRYING TO STOP SMOKING   Substance and Sexual Activity   • Alcohol use: No   • Drug use: No   • Sexual activity: Defer       Review of Systems:  History obtained from chart review and the patient  General ROS: No fever or chills  Respiratory ROS: No cough, shortness of breath, wheezing  Cardiovascular ROS: No chest pain or palpitations  Gastrointestinal ROS: No abdominal pain or melena  Genito-Urinary ROS: No dysuria or hematuria  Psych ROS: No anxiety and depression  14 point ROS reviewed with the patient and negative except as noted above and in the HPI unless unable to  obtain.    Objective:  Patient Vitals for the past 24 hrs:   BP Temp Temp src Pulse Resp SpO2 Weight   03/08/22 0748 163/65 97.6 °F (36.4 °C) Axillary 75 16 93 % --   03/08/22 0619 -- -- -- 71 16 -- --   03/08/22 0614 -- -- -- 72 16 94 % --   03/08/22 0547 136/69 98.7 °F (37.1 °C) Oral 73 16 94 % 78.9 kg (174 lb)   03/08/22 0040 134/66 98.9 °F (37.2 °C) Oral 76 16 92 % --   03/07/22 2050 116/62 98.5 °F (36.9 °C) Oral 79 16 93 % --   03/07/22 1950 -- -- -- 76 -- -- --   03/07/22 1944 -- -- -- 76 16 94 % --   03/07/22 1520 -- -- -- -- -- 94 % --   03/07/22 1510 108/61 98.6 °F (37 °C) Oral 80 16 (!) 86 % --   03/07/22 1413 -- -- -- 82 15 100 % --   03/07/22 1406 -- -- -- 82 11 95 % --   03/07/22 1400 146/72 -- -- 83 16 90 % --   03/07/22 1300 143/67 -- -- 84 14 93 % --   03/07/22 1200 -- -- -- 81 -- 94 % --   03/07/22 1145 171/68 -- -- 81 -- 97 % --   03/07/22 1130 159/73 -- -- 82 -- 95 % --   03/07/22 1115 162/71 -- -- 80 -- 91 % --   03/07/22 1100 170/80 99.4 °F (37.4 °C) Axillary 83 16 96 % --   03/07/22 1045 166/82 -- -- 82 -- 99 % --   03/07/22 1040 -- -- -- 84 8 100 % --   03/07/22 1032 -- -- -- 83 11 98 % --   03/07/22 1030 172/83 -- -- 83 -- 96 % --   03/07/22 1015 -- -- -- 84 -- 99 % --   03/07/22 1000 179/77 -- -- 85 14 93 % --   03/07/22 0945 168/76 -- -- 82 -- 97 % --     No intake or output data in the 24 hours ending 03/08/22 0934  General: awake/alert   Chest:  clear to auscultation bilaterally without respiratory distress  CVS: regular rate and rhythm  Abdominal: soft, nontender, positive bowel sounds  Extremities: no cyanosis or edema  Skin: warm and dry without rash      Labs:  Results from last 7 days   Lab Units 03/07/22  0426 03/05/22  1237 03/05/22  1010   WBC 10*3/mm3 7.43 8.12 7.17   HEMOGLOBIN g/dL 8.3* 9.1* 10.7*   HEMATOCRIT % 26.3* 28.7* 33.7*   PLATELETS 10*3/mm3 156 151 193         Results from last 7 days   Lab Units 03/07/22  0426 03/06/22  1840 03/06/22  1357 03/06/22  0420  03/06/22  0024 03/05/22  1237 03/05/22  1042   SODIUM mmol/L 128* 130* 128*   < > 132*   < > 119*   POTASSIUM mmol/L 5.1 4.6 6.6*   < > 3.2*   < > 4.3   CHLORIDE mmol/L 93* 94* 95*   < > 96*   < > 82*   CO2 mmol/L 22.0 23.0 20.0*   < > 25.0   < > 21.0*   BUN mg/dL 36* 33* 33*   < > 28*   < > 49*   CREATININE mg/dL 4.01* 3.63* 3.52*   < > 3.09*   < > 4.66*   CALCIUM mg/dL 8.1* 8.6 8.2*   < > 8.2*   < > 8.4*   EGFR mL/min/1.73 12.8* 14.4* 14.9*   < > 17.4*   < > 10.6*   BILIRUBIN mg/dL 0.4  --   --   --  0.3  --  0.4   ALK PHOS U/L 103  --   --   --  85  --  138*   ALT (SGPT) U/L 13  --   --   --  15  --  23   AST (SGOT) U/L 23  --   --   --  17  --  38*   GLUCOSE mg/dL 117* 95 144*   < > 188*   < > 972*    < > = values in this interval not displayed.       Radiology:   Imaging Results (Last 72 Hours)     Procedure Component Value Units Date/Time    XR Chest 1 View [[id number]] Collected: 03/06/22 0822     Updated: 03/06/22 0826    Narrative:      EXAMINATION: XR CHEST 1 VW- 3/6/2022 8:22 AM CST     HISTORY: Intubated Patient; R41.82-Altered mental status, unspecified.     REPORT: A frontal view of the chest was obtained.     COMPARISON: Chest x-ray 3/5/2022 1002 hours.     The endotracheal tube and NG tube appear in satisfactory position , the  right lung is clear, there are interstitial infiltrates in the left  medial lung extending into the left lung base, with there is also  probable atelectasis. No pneumothorax or pleural effusion is identified.  Vascular stents are noted in the left subclavian region, left upper arm.       Impression:      Interval insertion of an NG tube, in satisfactory position.  The endotracheal tube remains in good position. No change in patchy  interstitial infiltrates in the left lung and streaky atelectasis in the  left lung base.  This report was finalized on 03/06/2022 08:23 by Dr. Farhad Munoz MD.    XR Abdomen KUB [[id number]] Collected: 03/05/22 1723     Updated: 03/05/22 1726     Narrative:      HISTORY: NG placement     KUB: Frontal view of the lower chest and upper abdomen obtained.     Enteric tube tip is present within the body of the stomach. The  side-port at the GE junction. Left retrocardiac opacities.  Cholecystectomy clips.       Impression:      1. Enteric tube tip in the body the stomach with side-port at the GE  junction.  This report was finalized on 03/05/2022 17:23 by Dr. Desire Castro MD.    CT Cervical Spine Without Contrast [[id number]] Collected: 03/05/22 1047     Updated: 03/05/22 1054    Narrative:      EXAMINATION: CT CERVICAL SPINE WO CONTRAST- 3/5/2022 10:47 AM CST     HISTORY: Neck pain, acute, no red flags; R41.82-Altered mental status,  unspecified. Patient found on floor unresponsive. Unwitnessed fall.     DOSE: 362 mGycm (Automatic exposure control technique was implemented in  an effort to keep the radiation dose as low as possible without  compromising image quality)     REPORT: Spiral CT of the cervical spine was performed without contrast,  reconstructed coronal and sagittal images are also reviewed.     COMPARISON: There are no correlative imaging studies for comparison.     Sagittal images demonstrate straightening of the cervical curvature  without spondylolisthesis. There is moderate degenerative disc disease  at C6-7 with endplate spurring and mild disc space narrowing. No  fracture is identified. The patient is intubated and there is soft  tissue swelling in the hypopharynx. There is normal aeration of the  mastoid air cells. There is mild right-sided neuroforaminal narrowing at  C6-7 related to uncinate spurs. There is mild spinal stenosis at C6-7.  Soft tissue windows show no gross evidence of a traumatic cervical disc  herniation. The visualized upper lungs are clear. There is moderately  heavy calcified plaque within the carotid arteries at the bifurcation.  The       Impression:      No fracture, no acute osseous cervical spine  abnormality.  Partial visualization of the endotracheal tube, with soft tissue  swelling in the hypopharynx, not unexpected. Moderate spondylosis at  C6-7, with mild spinal stenosis right-sided neuroforaminal narrowing..              This report was finalized on 03/05/2022 10:51 by Dr. Farhad Munzo MD.    CT Head Without Contrast [[id number]] Collected: 03/05/22 1044     Updated: 03/05/22 1049    Narrative:      EXAMINATION: CT HEAD WO CONTRAST- 3/5/2022 10:44 AM CST     HISTORY: Delirium; R41.82-Altered mental status, unspecified.  Unresponsive.     DOSE: 625 mGycm (Automatic exposure control technique was implemented in  an effort to keep the radiation dose as low as possible without  compromising image quality)     REPORT: Spiral CT of the head was performed without contrast,  reconstructed coronal and sagittal images are also reviewed.     COMPARISON: CT head without contrast 10/5/2021.     No intracranial hemorrhage, mass or mass effect is identified. There is  mildly decreased attenuation in the periventricular white matter tracts  as before, compatible with mild chronic small vessel white matter  ischemic disease. The ventricles and basal cisterns are within normal  limits. Review of bone windows is negative for fractures. There soft  tissue swelling in the posterior nasopharynx which may be related to  recent endotracheal tube insertion.       Impression:      No acute intracranial abnormality. Mild chronic small vessel  white matter ischemic changes appear stable.     This report was finalized on 03/05/2022 10:46 by Dr. Farhad Munoz MD.    XR Chest 1 View [[id number]] Collected: 03/05/22 1035     Updated: 03/05/22 1040    Narrative:      EXAMINATION: XR CHEST 1 VW- 3/5/2022 10:35 AM CST     HISTORY: intubation; R41.82-Altered mental status, unspecified.     REPORT: A frontal view of the chest was obtained.     COMPARISON: Chest x-ray 10/4/2021.     The patient is intubated, the tip of the endotracheal  tube appears in  satisfactory position, approximately 2.8 cm superior to the jessie.  There are interstitial infiltrates in the left midlung and lower lung  zones, with associated volume loss. The right lung is clear. There is  mild cardiomegaly, this may be exaggerated by technique and positioning.  The patient is on a backboard. There is a stent in the left subclavian  and axillary region as before. No pneumothorax or pleural effusion is  identified. The upper abdomen appears unremarkable.       Impression:      Satisfactory position of the endotracheal tube. Interstitial  infiltrates are seen throughout most of the left lung without  consolidation. There is mild cardiomegaly, without evidence of overt  CHF.  This report was finalized on 03/05/2022 10:37 by Dr. Farhad Munoz MD.          Culture:  Blood Culture   Date Value Ref Range Status   03/05/2022 Staphylococcus, coagulase negative (C)  Preliminary   03/05/2022 No growth at 24 hours  Preliminary     Urine Culture   Date Value Ref Range Status   03/05/2022 50,000 CFU/mL Staphylococcus, coagulase negative (A)  Final     Comment:     By laboratory criteria, addititional testing is not indicated and unlikely to produce clinically relevant information.  Coagulase negative staphylococci are common skin contaminants, members of the oral lilly,  and of low virulence; requires clinical correlation.           Assessment   1.  End stage renal disease on HD TTS  2.  Type 2 diabetes   3.  Hypertension  4.  Hyperosmolar hyperglycemia--now HYPOglycemic  5.  Metabolic acidosis  6.  Metabolic encephalopathy--improving  7.  Hyponatremia  8.  Anemia of CKD  9.  Secondary hyperparathyroidism    Plan:  1.  Dialysis today  2.  Recurrent hypoglycemia this morning. Will start on low rate D10W infusion during dialysis to enable treatment to be completed.    Delmar Salazar, APRN  3/8/2022  09:34 CST

## 2022-03-08 NOTE — PLAN OF CARE
Goal Outcome Evaluation:  Plan of Care Reviewed With: patient        Progress: no change  Outcome Evaluation: Pt awake, alert and oriented. Pt denies pain. Pt with hypoglycemia this am despite IV dextrose 50 % and oral intake. ARNP ordered Dextrose 10% IV. Continue accu checked every 4 hours. Dialysis treatment today, 2 L removed. Pt tolerating diet.

## 2022-03-09 PROBLEM — S72.345G: Status: ACTIVE | Noted: 2022-01-01

## 2022-03-09 NOTE — PROGRESS NOTES
[facility] Medicine Services  INPATIENT PROGRESS NOTE    Patient Name: Ena Upton  Date of Admission: 3/5/2022  Today's Date: 03/09/22  Length of Stay: 4  Primary Care Physician: Yaron Wiggins APRN    Subjective   Chief Complaint: Weakness.   HPI   Blood glucoses ranging 121-157 on D10. Will stop and monitor closely.     Blood pressure has been much better over the last 24 hours.    Dialysis due again tomorrow.    Plain films of the left femur continues to show an oblique/spiral type fracture of the left distal femoral diaphysis extending to the metaphysis with no significant displacement.  The patient had been told to be nonweightbearing at the [facility] [place].  She has not seen orthopedics since the fracture was first discovered at [facility] in January.  We will consult them for an opinion here.    Review of Systems   All pertinent negatives and positives are as above. All other systems have been reviewed and are negative unless otherwise stated.     Objective    Temp:  [97.2 °F (36.2 °C)-98.4 °F (36.9 °C)] 97.8 °F (36.6 °C)  Heart Rate:  [61-72] 67  Resp:  [16-18] 16  BP: (104-158)/(57-69) 152/69  Physical Exam  Constitutional:       Appearance: She is well-developed. She is ill-appearing (chronically).      Comments: Up in bed, eating lunch.  No distress.  No family present.  Discussed with her nurse, Maci.    HENT:      Head: Normocephalic and atraumatic.   Eyes:      Conjunctiva/sclera: Conjunctivae normal.      Pupils: Pupils are equal, round, and reactive to light.   Neck:      Vascular: No JVD.   Cardiovascular:      Rate and Rhythm: Normal rate and regular rhythm.      Heart sounds: Normal heart sounds.      Comments: Sinus rhythm in the 60s.  Pulmonary:      Effort: Pulmonary effort is normal. No respiratory distress.      Breath sounds: Normal breath sounds.      Comments: On room air.   Chest:      Chest wall: No tenderness.   Abdominal:       General: Bowel sounds are normal. There is no distension.      Palpations: Abdomen is soft.      Tenderness: There is no abdominal tenderness.   Musculoskeletal:         General: No tenderness or deformity. Normal range of motion.      Cervical back: Neck supple.   Skin:     General: Skin is warm and dry.      Findings: No rash.   Neurological:      General: No focal deficit present.      Mental Status: She is alert and oriented to person, place, and time.      Cranial Nerves: No cranial nerve deficit.      Motor: Weakness present. No abnormal muscle tone.      Deep Tendon Reflexes: Reflexes normal.   Psychiatric:      Comments: More conversational today.        Results Review:  I have reviewed the labs, radiology results, and diagnostic studies.    Laboratory Data:   Results from last 7 days   Lab Units 03/09/22  0559 03/07/22  0426 03/05/22  1237   WBC 10*3/mm3 4.61 7.43 8.12   HEMOGLOBIN g/dL 7.6* 8.3* 9.1*   HEMATOCRIT % 24.7* 26.3* 28.7*   PLATELETS 10*3/mm3 182 156 151     Results from last 7 days   Lab Units 03/09/22  0559 03/07/22  0426 03/06/22  1840 03/06/22  0420 03/06/22  0024 03/05/22  1237 03/05/22  1042   SODIUM mmol/L 132* 128* 130*   < > 132*   < > 119*   POTASSIUM mmol/L 4.5 5.1 4.6   < > 3.2*   < > 4.3   CHLORIDE mmol/L 95* 93* 94*   < > 96*   < > 82*   CO2 mmol/L 24.0 22.0 23.0   < > 25.0   < > 21.0*   BUN mg/dL 29* 36* 33*   < > 28*   < > 49*   CREATININE mg/dL 3.62* 4.01* 3.63*   < > 3.09*   < > 4.66*   CALCIUM mg/dL 8.2* 8.1* 8.6   < > 8.2*   < > 8.4*   BILIRUBIN mg/dL  --  0.4  --   --  0.3  --  0.4   ALK PHOS U/L  --  103  --   --  85  --  138*   ALT (SGPT) U/L  --  13  --   --  15  --  23   AST (SGOT) U/L  --  23  --   --  17  --  38*   GLUCOSE mg/dL 121* 117* 95   < > 188*   < > 972*    < > = values in this interval not displayed.     Culture Data:   Blood Culture   Date Value Ref Range Status   03/05/2022 Staphylococcus, coagulase negative (C)  Preliminary   03/05/2022 No growth at 24  hours  Preliminary     Urine Culture   Date Value Ref Range Status   03/05/2022 50,000 CFU/mL Staphylococcus, coagulase negative (A)  Final     Comment:     By laboratory criteria, addititional testing is not indicated and unlikely to produce clinically relevant information.  Coagulase negative staphylococci are common skin contaminants, members of the oral lilly,  and of low virulence; requires clinical correlation.       I have reviewed the patient's current medications.     Assessment/Plan     Active Hospital Problems    Diagnosis    • **Metabolic encephalopathy    • HHNC (hyperglycemic hyperosmolar nonketotic coma) ([facility])    • Uncontrolled type 2 diabetes mellitus with hyperglycemia ([facility])    • Hypertensive emergency    • ESRD on hemodialysis ([facility])    • Hyponatremia    • Hyperkalemia    • Nondisplaced spiral fracture of shaft of left femur, subsequent encounter for closed fracture with delayed healing    • Acute respiratory failure secondary to an inability to protect her airway    • Tobacco abuse    • Anemia, chronic disease    • Elevated troponin due to ESRD and hypertensive urgency    • Hypothyroidism      Plan:  The patient was admitted on 3/5 by Dr. Prado.  She was found unresponsive.  EMS arrived on the scene and placed an LMA.  She was ultimately intubated in the emergency department.  She had a blood sugar of greater than 900 on arrival and was also severely hypertensive.  She has end-stage renal disease and does dialysis on Tuesday, Thursday, and Saturday.    She required ventilatory support in the emergency department secondary to altered mental status and inability to protect her airway.  She was extubated on Sunday, 3/6.  She has been evaluated by pulmonary.  She is currently on room air.    Nephrology consulted to assist with dialysis.  She received dialysis on Saturday in the intensive care unit setting.  She required a medical treatment for hyperkalemia on the afternoon of 3/6 and also received a  dose of Lokelma.  Potassium level 5.1 today.    She has significant problems with hypertension and is on multiple medications for this.  Dr. Prado had not been able to resume all of them.  She has previously required Cardene. I resumed Imdur, Procardia, and pushed her carvedilol and hydralazine back to her typical doses on the morning of 3/7 and then resumed her clonidine that afternoon.  Lisinopril and spironolactone on hold secondary to recent problems with hyperkalemia.  These should be able to be resumed soon as well if needed.     She had 1 bottle positive for Staphylococcus that was coagulase-negative.  Stopped vancomycin on 3/7. Consider this to be a contaminant.  She has also recently been on ceftriaxone.  Plan to stop this and monitor her as well.    She was initially on an insulin drip.  This was removed on 3/6.  She was reordered long-acting insulin and sliding scale insulin.  However, she has had some problems with hypoglycemia and was recently placed on dextrose fluids, which we will try her off of today.  Completely stopped long-acting insulin for now.  Her most recent hemoglobin A1c was 12.4 in January 2022.    Plain films of the left femur continues to show an oblique/spiral type fracture of the left distal femoral diaphysis extending to the metaphysis with no significant displacement.  The patient had been told to be nonweightbearing at the skilled nursing facility.  She has not seen orthopedics since the fracture was first discovered at [facility] in January.  We will consult them for an opinion here.    Offered a nicotine patch and counseled for tobacco cessation.    Reconciled and resumed other home medications as appropriate.    Speech therapy cleared for regular foods with thin liquids on 3/7.    Pepcid for peptic ulcer prophylaxis.    Lovenox for DVT prophylaxis.    Transferred to the floor on 3/7.    Discharge Planning: I expect the patient to be discharged home with home health versus SNF in  1-3 days.    Electronically signed by Thomas Oliver DO, 03/09/22, 12:36 CST.

## 2022-03-09 NOTE — PROGRESS NOTES
Nephrology ([facility]) Progress Note      Patient:  Ena Upton  YOB: 1970  Date of Service: 3/9/2022  MRN: [id number]   Acct: [id number]   Primary Care Physician: Yaron Wiggins APRN  Advance Directive:   Code Status and Medical Interventions:   Ordered at: 03/07/22 0841     Level Of Support Discussed With:    Patient     Code Status (Patient has no pulse and is not breathing):    CPR (Attempt to Resuscitate)     Medical Interventions (Patient has pulse or is breathing):    Full Support     Admit Date: 3/5/2022       Hospital Day: 4  Referring Provider: No Known Provider      Patient personally seen and examined.  Complete chart including Consults, Notes, Operative Reports, Labs, Cardiology, and Radiology studies reviewed as able.        Subjective:  Ena Upton is a 52 y.o. female for whom we were consulted for evaluation and treatment of end stage renal disease. Maintenance hemodialysis on Tuesday Thursday Saturday at [facility].  Frequently skips her dialysis treatments. History of type 2 diabetes, hypertension, hypothyroid. Found down at home on 3/05 and brought to ER via EMS. Initial blood glucose over 900 and patient required intubation. Tolerated HD well on 3/05. Was on Cardene drip due to hypertension. Extubated on 3/06.  Following IV and PO potassium supplementation, she developed hyperkalemia of 6.6. This was managed medically. Sent to medical floor on 3/07. On 3/08 developed significant hypoglycemia. Ultimately required resumption of dextrose IV fluids to maintain blood glucose. Completed HD on 3/08 without issue.    Today is feeling about the same. No new overnight issues. Remains on D10W infusion.       Allergies:  Penicillins, Lamisil [terbinafine], Reglan [metoclopramide], and Stadol [butorphanol]    Home Meds:  Medications Prior to Admission   Medication Sig Dispense Refill Last Dose   • acetaminophen (TYLENOL) 325 MG tablet Take 2  tablets by mouth Every 6 (Six) Hours As Needed for Mild Pain .   Unknown at Unknown time   • albuterol (PROVENTIL HFA;VENTOLIN HFA) 108 (90 BASE) MCG/ACT inhaler Inhale 2 puffs Every 4 (Four) Hours As Needed for wheezing.   Unknown at Unknown time   • cefuroxime (CEFTIN) 250 MG tablet Take 250 mg by mouth Daily.      • cloNIDine (CATAPRES-TTS) 0.1 MG/24HR patch Place 1 patch on the skin as directed by provider 1 (One) Time Per Week.   Unknown at Unknown time   • DULoxetine (CYMBALTA) 60 MG capsule Take 60 mg by mouth daily.   Unknown at Unknown time   • famotidine (PEPCID) 20 MG tablet Take 20 mg by mouth Daily.   Unknown at Unknown time   • hydrALAZINE (APRESOLINE) 50 MG tablet Take 50 mg by mouth 3 (Three) Times a Day.   Unknown at Unknown time   • insulin aspart (novoLOG FLEXPEN) 100 UNIT/ML solution pen-injector sc pen Inject 14 Units under the skin into the appropriate area as directed 3 (Three) Times a Day With Meals.   Unknown at Unknown time   • Insulin Glargine (BASAGLAR KWIKPEN) 100 UNIT/ML injection pen Inject 70 Units under the skin into the appropriate area as directed Every Night.   Unknown at Unknown time   • isosorbide mononitrate (IMDUR) 120 MG 24 hr tablet Take 120 mg by mouth Daily.      • lactulose (CHRONULAC) 10 GM/15ML solution Take 10 g by mouth 3 (Three) Times a Day With Meals. For constipation - HOLD for loose stools      • levothyroxine (SYNTHROID, LEVOTHROID) 200 MCG tablet Take 200 mcg by mouth Daily.   Unknown at Unknown time   • minoxidil (LONITEN) 2.5 MG tablet Take 2.5 mg by mouth Daily.   Unknown at Unknown time   • ondansetron (ZOFRAN) 4 MG tablet Take 4 mg by mouth Every 8 (Eight) Hours As Needed for Nausea or Vomiting.   Unknown at Unknown time   • senna (senna) 8.6 MG tablet Take 1 tablet by mouth Daily.   Unknown   • simvastatin (ZOCOR) 20 MG tablet Take 20 mg by mouth Daily.   Unknown at Unknown time   • vitamin D (ERGOCALCIFEROL) 1.25 MG (06022 UT) capsule capsule Take 50,000  Units by mouth Every 14 (Fourteen) Days.   Unknown at Unknown time       Medicines:  Current Facility-Administered Medications   Medication Dose Route Frequency Provider Last Rate Last Admin   • acetaminophen (TYLENOL) tablet 650 mg  650 mg Oral Q4H PRN Thomas Oliver DO   650 mg at 03/09/22 0843    Or   • acetaminophen (TYLENOL) 160 MG/5ML solution 650 mg  650 mg Oral Q4H PRN Thomas Oliver DO        Or   • acetaminophen (TYLENOL) suppository 650 mg  650 mg Rectal Q4H PRN Thomas Oliver DO       • atorvastatin (LIPITOR) tablet 20 mg  20 mg Oral Nightly Thomas Oliver DO   20 mg at 03/08/22 2004   • carvedilol (COREG) tablet 25 mg  25 mg Oral BID With Meals Thomas Oliver DO   25 mg at 03/09/22 0830   • cloNIDine (CATAPRES) tablet 0.3 mg  0.3 mg Oral Q8H Thomas Oliver DO   0.3 mg at 03/09/22 0528   • dextrose (D50W) (25 g/50 mL) IV injection 12.5 g  12.5 g Intravenous PRN Thomas Oliver DO   12.5 g at 03/06/22 2212   • dextrose (D50W) (25 g/50 mL) IV injection 25 g  25 g Intravenous Q15 Min PRN Thomas Oliver DO   25 g at 03/08/22 0922   • dextrose (GLUTOSE) oral gel 15 g  15 g Oral Q15 Min PRN Thomas Oliver DO       • dextrose 10 % infusion  50 mL/hr Intravenous Continuous Delmar Salazar APRN 50 mL/hr at 03/09/22 0446 50 mL/hr at 03/09/22 0446   • DULoxetine (CYMBALTA) DR capsule 60 mg  60 mg Oral Daily Thomas Oliver DO   60 mg at 03/09/22 0830   • enoxaparin (LOVENOX) syringe 30 mg  30 mg Subcutaneous Q24H Thomas Oliver DO   30 mg at 03/08/22 1356   • famotidine (PEPCID) tablet 20 mg  20 mg Oral Daily Thomas Oliver DO   20 mg at 03/09/22 0830   • glucagon (human recombinant) (GLUCAGEN DIAGNOSTIC) injection 1 mg  1 mg Intramuscular Q15 Min PRN Thomas Oliver, DO       • hydrALAZINE (APRESOLINE) tablet 75 mg  75 mg Oral Q8H Thomas Oliver,    75 mg at 03/09/22 0528   • insulin lispro (humaLOG) injection 2-7 Units  2-7 Units Subcutaneous Q4H Thomas Oliver,    2 Units at  03/09/22 0443   • ipratropium-albuterol (DUO-NEB) nebulizer solution 3 mL  3 mL Nebulization 4x Daily - RT Thomas Oliver, DO   3 mL at 03/09/22 0643   • isosorbide mononitrate (IMDUR) 24 hr tablet 60 mg  60 mg Oral Q24H Thomas Oliver, DO   60 mg at 03/09/22 0830   • labetalol (NORMODYNE,TRANDATE) injection 10 mg  10 mg Intravenous Q4H PRN Thomas Oliver, DO   10 mg at 03/07/22 0411   • levothyroxine (SYNTHROID, LEVOTHROID) tablet 50 mcg  50 mcg Oral Q AM Thomas Oliver DO   50 mcg at 03/09/22 0528   • LORazepam (ATIVAN) injection 1 mg  1 mg Intravenous Q4H PRN Thomas Oliver, DO   1 mg at 03/05/22 1303   • Morphine sulfate (PF) injection 2 mg  2 mg Intravenous Q4H PRN Thomas Oliver DO   2 mg at 03/09/22 0529   • nicotine (NICODERM CQ) 14 MG/24HR patch 1 patch  1 patch Transdermal Q24H Thomas Oliver DO   1 patch at 03/09/22 0830   • NIFEdipine XL (PROCARDIA XL) 24 hr tablet 60 mg  60 mg Oral Q24H Thomas Oliver, DO   60 mg at 03/09/22 0830   • ondansetron (ZOFRAN) injection 4 mg  4 mg Intravenous Q6H PRN Thomas Oliver DO       • sodium chloride 0.45 % infusion  50 mL/hr Intravenous Continuous PRN Thomas Oliver DO       • sodium chloride 0.9 % flush 10 mL  10 mL Intravenous Q12H Thomas Oliver DO   10 mL at 03/08/22 0805   • sodium chloride 0.9 % flush 10 mL  10 mL Intravenous PRN Thomas Oliver DO       • sodium chloride 0.9 % flush 10 mL  10 mL Intravenous Once PRN Thomas Oliver DO       • sodium chloride 0.9 % flush 10 mL  10 mL Intravenous Q12H Thomas Oliver DO   10 mL at 03/08/22 2006   • sodium chloride 0.9 % flush 10 mL  10 mL Intravenous PRN Thomas Oliver, DO       • sodium chloride 0.9 % infusion  50 mL/hr Intravenous Continuous PRN Thomas Oliver,        • sodium chloride 0.9 % infusion  10 mL/hr Intravenous Continuous PRN Thomas Oliver, DO           Past Medical History:  Past Medical History:   Diagnosis Date   • Anxiety    • Arthritis    • Chronic kidney disease      STAGE V RENAL FAILURE   • Depression    • Diabetes mellitus (HCC)    • HTN (hypertension)    • Hypothyroidism    • Obesity    • Tremors of nervous system        Past Surgical History:  Past Surgical History:   Procedure Laterality Date   • ARTERIOVENOUS FISTULA     • BREAST BIOPSY     • CARPAL TUNNEL RELEASE     • CATH LAB PROCEDURE     • CHOLECYSTECTOMY     • TUBAL ABDOMINAL LIGATION         Family History  Family History   Problem Relation Age of Onset   • Cancer Other    • Hypertension Other    • Kidney disease Other    • Heart disease Other    • Diabetes Other    • Diabetes Mother    • Diabetes Maternal Aunt    • Diabetes Maternal Grandmother        Social History  Social History     Socioeconomic History   • Marital status:    Tobacco Use   • Smoking status: Current Every Day Smoker     Packs/day: 0.50   • Smokeless tobacco: Never Used   • Tobacco comment: CUTTING BACK AND TRYING TO STOP SMOKING   Substance and Sexual Activity   • Alcohol use: No   • Drug use: No   • Sexual activity: Defer       Review of Systems:  History obtained from chart review and the patient  General ROS: No fever or chills  Respiratory ROS: No cough, shortness of breath, wheezing  Cardiovascular ROS: No chest pain or palpitations  Gastrointestinal ROS: No abdominal pain or melena  Genito-Urinary ROS: No dysuria or hematuria  Psych ROS: No anxiety and depression  14 point ROS reviewed with the patient and negative except as noted above and in the HPI unless unable to obtain.    Objective:  Patient Vitals for the past 24 hrs:   BP Temp Temp src Pulse Resp SpO2   03/09/22 0759 152/69 97.8 °F (36.6 °C) Oral 72 16 97 %   03/09/22 0648 -- -- -- 68 16 --   03/09/22 0643 -- -- -- 70 16 98 %   03/09/22 0435 158/69 98.4 °F (36.9 °C) Oral 70 16 95 %   03/09/22 0023 142/62 98.1 °F (36.7 °C) Oral 70 16 98 %   03/08/22 1942 -- -- -- 64 -- 100 %   03/08/22 1937 -- -- -- 68 16 93 %   03/08/22 1911 116/62 98 °F (36.7 °C) Oral 65 18 93 %    03/08/22 1559 104/57 97.6 °F (36.4 °C) Oral 62 16 91 %   03/08/22 1431 -- -- -- 62 16 --   03/08/22 1425 -- -- -- 63 16 93 %   03/08/22 1356 111/61 97.2 °F (36.2 °C) Axillary 61 17 95 %       Intake/Output Summary (Last 24 hours) at 3/9/2022 0949  Last data filed at 3/9/2022 0833  Gross per 24 hour   Intake 1115 ml   Output 2000 ml   Net -885 ml     General: awake/alert   Chest:  clear to auscultation bilaterally without respiratory distress  CVS: regular rate and rhythm  Abdominal: soft, nontender, positive bowel sounds  Extremities: no cyanosis or edema  Skin: warm and dry without rash   Neuro: no focal motor deficits      Labs:  Results from last 7 days   Lab Units 03/09/22  0559 03/07/22  0426 03/05/22  1237   WBC 10*3/mm3 4.61 7.43 8.12   HEMOGLOBIN g/dL 7.6* 8.3* 9.1*   HEMATOCRIT % 24.7* 26.3* 28.7*   PLATELETS 10*3/mm3 182 156 151         Results from last 7 days   Lab Units 03/09/22  0559 03/07/22  0426 03/06/22  1840 03/06/22  0420 03/06/22  0024 03/05/22  1237 03/05/22  1042   SODIUM mmol/L 132* 128* 130*   < > 132*   < > 119*   POTASSIUM mmol/L 4.5 5.1 4.6   < > 3.2*   < > 4.3   CHLORIDE mmol/L 95* 93* 94*   < > 96*   < > 82*   CO2 mmol/L 24.0 22.0 23.0   < > 25.0   < > 21.0*   BUN mg/dL 29* 36* 33*   < > 28*   < > 49*   CREATININE mg/dL 3.62* 4.01* 3.63*   < > 3.09*   < > 4.66*   CALCIUM mg/dL 8.2* 8.1* 8.6   < > 8.2*   < > 8.4*   EGFR mL/min/1.73 14.4* 12.8* 14.4*   < > 17.4*   < > 10.6*   BILIRUBIN mg/dL  --  0.4  --   --  0.3  --  0.4   ALK PHOS U/L  --  103  --   --  85  --  138*   ALT (SGPT) U/L  --  13  --   --  15  --  23   AST (SGOT) U/L  --  23  --   --  17  --  38*   GLUCOSE mg/dL 121* 117* 95   < > 188*   < > 972*    < > = values in this interval not displayed.       Radiology:   Imaging Results (Last 72 Hours)     Procedure Component Value Units Date/Time    XR Femur 2 View Left [[id number]] Collected: 03/08/22 1721     Updated: 03/08/22 1725    Narrative:      HISTORY: Pain     Left femur:  2 views left femur are obtained.     There is a spiral type fracture involving the left distal femoral  diaphysis 6 extending to the metaphysis. No prominent displacement.  Proximal and mid left femur are intact. Diffuse vascular calcification.       Impression:      1. Oblique/spiral type fracture of the left distal femoral diaphysis  extending to the metaphysis. No significant displacement.  This report was finalized on 03/08/2022 17:22 by Dr. Desire Castro MD.    XR Pelvis 1 or 2 View [[id number]] Collected: 03/08/22 1624     Updated: 03/08/22 1628    Narrative:      EXAMINATION: XR PELVIS 1 OR 2 VW- 3/8/2022 4:24 PM CST     HISTORY: Pain.     REPORT: An AP view the pelvis was obtained.     COMPARISON: There are no correlative imaging studies for comparison.     The images are underpenetrated, limiting of the hips appears normal and  no fracture is identified. There is no evidence of AVN. The osseous  structures appear osteopenic. Moderate stool volume is present. There  are scattered phleboliths in the pelvis.       Impression:      Limited exam due to underpenetration and possible diffuse  osteopenia. No acute acute osseous abnormality is identified.  This report was finalized on 03/08/2022 16:25 by Dr. Farhad Munoz MD.          Culture:  Blood Culture   Date Value Ref Range Status   03/05/2022 Staphylococcus, coagulase negative (C)  Preliminary   03/05/2022 No growth at 24 hours  Preliminary     Urine Culture   Date Value Ref Range Status   03/05/2022 50,000 CFU/mL Staphylococcus, coagulase negative (A)  Final     Comment:     By laboratory criteria, addititional testing is not indicated and unlikely to produce clinically relevant information.  Coagulase negative staphylococci are common skin contaminants, members of the oral lilly,  and of low virulence; requires clinical correlation.           Assessment   1.  End stage renal disease on HD TTS  2.  Type 2 diabetes   3.  Hypertension  4.  Hyperosmolar  hyperglycemia--now HYPOglycemic  5.  Metabolic acidosis  6.  Metabolic encephalopathy--resolving  7.  Hyponatremia  8.  Anemia of CKD  9.  Secondary hyperparathyroidism    Plan:  1.  Dialysis next due 3/10  2.  OK from renal standpoint to wean dextrose infusion when OK with attending physician.   3.  Monitor labs    Delmar Salazar, APRN  3/9/2022  09:49 CST

## 2022-03-09 NOTE — PLAN OF CARE
Goal Outcome Evaluation:              Glucose accuchecks performed q4, results ranging from 152-157, coverage provided. D10 infusing. Pt medicated for pain x2. No distress noted.

## 2022-03-10 NOTE — PLAN OF CARE
Goal Outcome Evaluation:  Plan of Care Reviewed With: patient        Progress: no change  Outcome Evaluation: IV pain medication given once. Dialysis 2.8 L removed. Check BS every 4 hours. Safety maintained.

## 2022-03-10 NOTE — PROGRESS NOTES
Nephrology ([facility]) Progress Note      Patient:  Ena Upton  YOB: 1970  Date of Service: 3/10/2022  MRN: [id number]   Acct: [id number]   Primary Care Physician: Yaron Wiggins APRN  Advance Directive:   Code Status and Medical Interventions:   Ordered at: 03/07/22 0841     Level Of Support Discussed With:    Patient     Code Status (Patient has no pulse and is not breathing):    CPR (Attempt to Resuscitate)     Medical Interventions (Patient has pulse or is breathing):    Full Support     Admit Date: 3/5/2022       Hospital Day: 5  Referring Provider: No Known Provider      Patient personally seen and examined.  Complete chart including Consults, Notes, Operative Reports, Labs, Cardiology, and Radiology studies reviewed as able.        Subjective:  Ena Upton is a 52 y.o. female for whom we were consulted for evaluation and treatment of end stage renal disease. Maintenance hemodialysis on Tuesday Thursday Saturday at [facility].  Frequently skips her dialysis treatments. History of type 2 diabetes, hypertension, hypothyroid. Found down at home on 3/05 and brought to ER via EMS. Initial blood glucose over 900 and patient required intubation. Tolerated HD well on 3/05. Was on Cardene drip due to hypertension. Extubated on 3/06.  Following IV and PO potassium supplementation, she developed hyperkalemia of 6.6. This was managed medically. Sent to medical floor on 3/07. On 3/08 developed significant hypoglycemia. Ultimately required resumption of dextrose IV fluids to maintain blood glucose. Completed HD on 3/08 without issue.    Today is feeling a little better, blood sugar more stable last night. No new overnight issues. Seen during dialysis  Dialysis   Pt was seen on RRT. Tolerating well  Modality: Hemodialysis  Access: Arterial Venous Fistula  Location: left upper  QB: 400  QD: 600  UF: 2800      Allergies:  Penicillins, Lamisil [terbinafine],  Reglan [metoclopramide], and Stadol [butorphanol]    Home Meds:  Medications Prior to Admission   Medication Sig Dispense Refill Last Dose   • acetaminophen (TYLENOL) 325 MG tablet Take 2 tablets by mouth Every 6 (Six) Hours As Needed for Mild Pain .   Unknown at Unknown time   • albuterol (PROVENTIL HFA;VENTOLIN HFA) 108 (90 BASE) MCG/ACT inhaler Inhale 2 puffs Every 4 (Four) Hours As Needed for wheezing.   Unknown at Unknown time   • cefuroxime (CEFTIN) 250 MG tablet Take 250 mg by mouth Daily.      • cloNIDine (CATAPRES-TTS) 0.1 MG/24HR patch Place 1 patch on the skin as directed by provider 1 (One) Time Per Week.   Unknown at Unknown time   • DULoxetine (CYMBALTA) 60 MG capsule Take 60 mg by mouth daily.   Unknown at Unknown time   • famotidine (PEPCID) 20 MG tablet Take 20 mg by mouth Daily.   Unknown at Unknown time   • hydrALAZINE (APRESOLINE) 50 MG tablet Take 50 mg by mouth 3 (Three) Times a Day.   Unknown at Unknown time   • insulin aspart (novoLOG FLEXPEN) 100 UNIT/ML solution pen-injector sc pen Inject 14 Units under the skin into the appropriate area as directed 3 (Three) Times a Day With Meals.   Unknown at Unknown time   • Insulin Glargine (BASAGLAR KWIKPEN) 100 UNIT/ML injection pen Inject 70 Units under the skin into the appropriate area as directed Every Night.   Unknown at Unknown time   • isosorbide mononitrate (IMDUR) 120 MG 24 hr tablet Take 120 mg by mouth Daily.      • lactulose (CHRONULAC) 10 GM/15ML solution Take 10 g by mouth 3 (Three) Times a Day With Meals. For constipation - HOLD for loose stools      • levothyroxine (SYNTHROID, LEVOTHROID) 200 MCG tablet Take 200 mcg by mouth Daily.   Unknown at Unknown time   • minoxidil (LONITEN) 2.5 MG tablet Take 2.5 mg by mouth Daily.   Unknown at Unknown time   • ondansetron (ZOFRAN) 4 MG tablet Take 4 mg by mouth Every 8 (Eight) Hours As Needed for Nausea or Vomiting.   Unknown at Unknown time   • senna (senna) 8.6 MG tablet Take 1 tablet by  mouth Daily.   Unknown   • simvastatin (ZOCOR) 20 MG tablet Take 20 mg by mouth Daily.   Unknown at Unknown time   • vitamin D (ERGOCALCIFEROL) 1.25 MG (62057 UT) capsule capsule Take 50,000 Units by mouth Every 14 (Fourteen) Days.   Unknown at Unknown time       Medicines:  Current Facility-Administered Medications   Medication Dose Route Frequency Provider Last Rate Last Admin   • acetaminophen (TYLENOL) tablet 650 mg  650 mg Oral Q4H PRN Thomas Oliver DO   650 mg at 03/09/22 1754    Or   • acetaminophen (TYLENOL) 160 MG/5ML solution 650 mg  650 mg Oral Q4H PRN Thomas Oliver DO        Or   • acetaminophen (TYLENOL) suppository 650 mg  650 mg Rectal Q4H PRN Thomas Oliver DO       • atorvastatin (LIPITOR) tablet 20 mg  20 mg Oral Nightly Thomas Oliver DO   20 mg at 03/09/22 2045   • carvedilol (COREG) tablet 25 mg  25 mg Oral BID With Meals Thomas Oliver DO   25 mg at 03/09/22 1755   • cloNIDine (CATAPRES) tablet 0.3 mg  0.3 mg Oral Q8H Thomas Oliver DO   0.3 mg at 03/10/22 0640   • dextrose (D50W) (25 g/50 mL) IV injection 12.5 g  12.5 g Intravenous PRN Thomas Oliver DO   12.5 g at 03/06/22 2212   • dextrose (D50W) (25 g/50 mL) IV injection 25 g  25 g Intravenous Q15 Min PRN Thomas Oliver DO   25 g at 03/08/22 0922   • dextrose (GLUTOSE) oral gel 15 g  15 g Oral Q15 Min PRN Thomas Oliver DO       • dextrose 10 % infusion  50 mL/hr Intravenous Continuous Delmar Salazar APRN   Stopped at 03/09/22 1235   • DULoxetine (CYMBALTA) DR capsule 60 mg  60 mg Oral Daily Thomas Oliver DO   60 mg at 03/09/22 0830   • enoxaparin (LOVENOX) syringe 30 mg  30 mg Subcutaneous Q24H Thomas Oliver DO   30 mg at 03/09/22 1441   • epoetin yolanda-epbx (RETACRIT) injection 10,000 Units  10,000 Units Subcutaneous Once per day on Mon Wed Fri Eron Blood MD   10,000 Units at 03/09/22 1755   • famotidine (PEPCID) tablet 20 mg  20 mg Oral Daily Thomas Oliver DO   20 mg at 03/09/22 0830   •  glucagon (human recombinant) (GLUCAGEN DIAGNOSTIC) injection 1 mg  1 mg Intramuscular Q15 Min PRN Thomas Oliver, DO       • hydrALAZINE (APRESOLINE) tablet 75 mg  75 mg Oral Q8H Thomas Oliver, DO   75 mg at 03/10/22 0640   • insulin lispro (humaLOG) injection 2-7 Units  2-7 Units Subcutaneous Q4H Thomas Oliver, DO   3 Units at 03/10/22 0031   • ipratropium-albuterol (DUO-NEB) nebulizer solution 3 mL  3 mL Nebulization 4x Daily - RT Thomas Oliver, DO   3 mL at 03/10/22 0618   • isosorbide mononitrate (IMDUR) 24 hr tablet 60 mg  60 mg Oral Q24H Thomas Oliver, DO   60 mg at 03/09/22 0830   • labetalol (NORMODYNE,TRANDATE) injection 10 mg  10 mg Intravenous Q4H PRN Thomas Oliver, DO   10 mg at 03/07/22 0411   • levothyroxine (SYNTHROID, LEVOTHROID) tablet 50 mcg  50 mcg Oral Q AM Thomas Oliver, DO   50 mcg at 03/10/22 0641   • LORazepam (ATIVAN) injection 1 mg  1 mg Intravenous Q4H PRN Thomas Oliver, DO   1 mg at 03/05/22 1303   • Morphine sulfate (PF) injection 2 mg  2 mg Intravenous Q4H PRN Thoams Oliver, DO   2 mg at 03/10/22 0640   • nicotine (NICODERM CQ) 14 MG/24HR patch 1 patch  1 patch Transdermal Q24H Thomas Oliver DO   1 patch at 03/09/22 0830   • NIFEdipine XL (PROCARDIA XL) 24 hr tablet 60 mg  60 mg Oral Q24H Thomas Oliver, DO   60 mg at 03/09/22 0830   • ondansetron (ZOFRAN) injection 4 mg  4 mg Intravenous Q6H PRN Thomas Oliver, DO       • sodium chloride 0.45 % infusion  50 mL/hr Intravenous Continuous PRN Thomas Oliver, DO       • sodium chloride 0.9 % flush 10 mL  10 mL Intravenous Q12H Thomas Oliver, DO   10 mL at 03/08/22 0805   • sodium chloride 0.9 % flush 10 mL  10 mL Intravenous PRN Thomas Oliver DO       • sodium chloride 0.9 % flush 10 mL  10 mL Intravenous Q12H Thomas Oliver, DO   10 mL at 03/09/22 2035   • sodium chloride 0.9 % infusion  50 mL/hr Intravenous Continuous PRN Thomas lOiver, DO       • sodium chloride 0.9 % infusion  10 mL/hr Intravenous  Thomas Couch DO           Past Medical History:  Past Medical History:   Diagnosis Date   • Anxiety    • Arthritis    • Chronic kidney disease     STAGE V RENAL FAILURE   • Depression    • Diabetes mellitus (HCC)    • HTN (hypertension)    • Hypothyroidism    • Obesity    • Tremors of nervous system        Past Surgical History:  Past Surgical History:   Procedure Laterality Date   • ARTERIOVENOUS FISTULA     • BREAST BIOPSY     • CARPAL TUNNEL RELEASE     • CATH LAB PROCEDURE     • CHOLECYSTECTOMY     • TUBAL ABDOMINAL LIGATION         Family History  Family History   Problem Relation Age of Onset   • Cancer Other    • Hypertension Other    • Kidney disease Other    • Heart disease Other    • Diabetes Other    • Diabetes Mother    • Diabetes Maternal Aunt    • Diabetes Maternal Grandmother        Social History  Social History     Socioeconomic History   • Marital status:    Tobacco Use   • Smoking status: Current Every Day Smoker     Packs/day: 0.50   • Smokeless tobacco: Never Used   • Tobacco comment: CUTTING BACK AND TRYING TO STOP SMOKING   Substance and Sexual Activity   • Alcohol use: No   • Drug use: No   • Sexual activity: Defer       Review of Systems:  History obtained from chart review and the patient  General ROS: No fever or chills  Respiratory ROS: No cough, shortness of breath, wheezing  Cardiovascular ROS: No chest pain or palpitations  Gastrointestinal ROS: No abdominal pain or melena  Genito-Urinary ROS: No dysuria or hematuria  Psych ROS: No anxiety and depression  14 point ROS reviewed with the patient and negative except as noted above and in the HPI unless unable to obtain.    Objective:  Patient Vitals for the past 24 hrs:   BP Temp Temp src Pulse Resp SpO2   03/10/22 0623 -- -- -- 57 16 --   03/10/22 0618 -- -- -- 61 16 92 %   03/10/22 0446 132/60 97.5 °F (36.4 °C) Oral 64 18 95 %   03/09/22 2334 134/62 98.3 °F (36.8 °C) Oral 64 16 94 %   03/09/22 2250 124/62 --  -- 63 -- --   03/09/22 1954 126/57 97.8 °F (36.6 °C) Oral 71 16 90 %   03/09/22 1915 -- -- -- 66 16 99 %   03/09/22 1911 -- -- -- 68 16 90 %   03/09/22 1607 137/62 97.8 °F (36.6 °C) Oral 73 16 90 %   03/09/22 1439 147/67 -- -- 72 -- --   03/09/22 1434 -- -- -- 71 16 --   03/09/22 1428 -- -- -- 72 16 (!) 85 %   03/09/22 1258 144/63 98.2 °F (36.8 °C) Oral 72 18 90 %   03/09/22 1100 -- -- -- 67 16 --   03/09/22 1055 -- -- -- 69 16 97 %       Intake/Output Summary (Last 24 hours) at 3/10/2022 1026  Last data filed at 3/9/2022 1829  Gross per 24 hour   Intake 440 ml   Output --   Net 440 ml     General: awake/alert   Chest:  clear to auscultation bilaterally without respiratory distress  CVS: regular rate and rhythm  Abdominal: soft, nontender, positive bowel sounds  Extremities: no cyanosis or edema  Skin: warm and dry without rash   Neuro: no focal motor deficits      Labs:  Results from last 7 days   Lab Units 03/09/22  0559 03/07/22  0426 03/05/22  1237   WBC 10*3/mm3 4.61 7.43 8.12   HEMOGLOBIN g/dL 7.6* 8.3* 9.1*   HEMATOCRIT % 24.7* 26.3* 28.7*   PLATELETS 10*3/mm3 182 156 151         Results from last 7 days   Lab Units 03/09/22  0559 03/07/22  0426 03/06/22  1840 03/06/22  0420 03/06/22  0024 03/05/22  1237 03/05/22  1042   SODIUM mmol/L 132* 128* 130*   < > 132*   < > 119*   POTASSIUM mmol/L 4.5 5.1 4.6   < > 3.2*   < > 4.3   CHLORIDE mmol/L 95* 93* 94*   < > 96*   < > 82*   CO2 mmol/L 24.0 22.0 23.0   < > 25.0   < > 21.0*   BUN mg/dL 29* 36* 33*   < > 28*   < > 49*   CREATININE mg/dL 3.62* 4.01* 3.63*   < > 3.09*   < > 4.66*   CALCIUM mg/dL 8.2* 8.1* 8.6   < > 8.2*   < > 8.4*   EGFR mL/min/1.73 14.4* 12.8* 14.4*   < > 17.4*   < > 10.6*   BILIRUBIN mg/dL  --  0.4  --   --  0.3  --  0.4   ALK PHOS U/L  --  103  --   --  85  --  138*   ALT (SGPT) U/L  --  13  --   --  15  --  23   AST (SGOT) U/L  --  23  --   --  17  --  38*   GLUCOSE mg/dL 121* 117* 95   < > 188*   < > 972*    < > = values in this interval not  displayed.       Radiology:   Imaging Results (Last 72 Hours)     Procedure Component Value Units Date/Time    XR Femur 2 View Left [[id number]] Collected: 03/08/22 1721     Updated: 03/08/22 1725    Narrative:      HISTORY: Pain     Left femur: 2 views left femur are obtained.     There is a spiral type fracture involving the left distal femoral  diaphysis 6 extending to the metaphysis. No prominent displacement.  Proximal and mid left femur are intact. Diffuse vascular calcification.       Impression:      1. Oblique/spiral type fracture of the left distal femoral diaphysis  extending to the metaphysis. No significant displacement.  This report was finalized on 03/08/2022 17:22 by Dr. Desire Castro MD.    XR Pelvis 1 or 2 View [[id number]] Collected: 03/08/22 1624     Updated: 03/08/22 1628    Narrative:      EXAMINATION: XR PELVIS 1 OR 2 VW- 3/8/2022 4:24 PM CST     HISTORY: Pain.     REPORT: An AP view the pelvis was obtained.     COMPARISON: There are no correlative imaging studies for comparison.     The images are underpenetrated, limiting of the hips appears normal and  no fracture is identified. There is no evidence of AVN. The osseous  structures appear osteopenic. Moderate stool volume is present. There  are scattered phleboliths in the pelvis.       Impression:      Limited exam due to underpenetration and possible diffuse  osteopenia. No acute acute osseous abnormality is identified.  This report was finalized on 03/08/2022 16:25 by Dr. Farhad Munoz MD.          Culture:  Blood Culture   Date Value Ref Range Status   03/05/2022 Staphylococcus, coagulase negative (C)  Preliminary   03/05/2022 No growth at 24 hours  Preliminary     Urine Culture   Date Value Ref Range Status   03/05/2022 50,000 CFU/mL Staphylococcus, coagulase negative (A)  Final     Comment:     By laboratory criteria, addititional testing is not indicated and unlikely to produce clinically relevant information.  Coagulase negative  staphylococci are common skin contaminants, members of the oral lilly,  and of low virulence; requires clinical correlation.           Assessment   1.  End stage renal disease on HD TTS  2.  Type 2 diabetes   3.  Hypertension  4.  Hyperosmolar hyperglycemia--now HYPOglycemic  5.  Metabolic acidosis  6.  Metabolic encephalopathy--resolving  7.  Hyponatremia  8.  Anemia of CKD  9.  Secondary hyperparathyroidism    Plan:  1.  Dialysis today  2.  Monitor labs    Delmar Salazar, GAEL  3/10/2022  10:26 CST

## 2022-03-10 NOTE — CASE MANAGEMENT/SOCIAL WORK
Continued Stay Note   [facility]     Patient Name: Ena Upton  MRN: [id number]  Today's Date: 3/10/2022    Admit Date: 3/5/2022     Discharge Plan     Row Name 03/10/22 1041       Plan    Plan Home vs SNF    Patient/Family in Agreement with Plan unable to assess    Plan Comments Rec'd a referral that pt may need access to resources. Unable to speak with her because she is currently off the floor. She may also need to go to snf so informed Dr. Oliver pt will need PT/OT to see if she would qualify.               Discharge Codes    No documentation.                     GREGORIO Angeles

## 2022-03-10 NOTE — PLAN OF CARE
Goal Outcome Evaluation:  Plan of Care Reviewed With: patient        Progress: improving  Outcome Evaluation: right iid patent. alert and oriented. pain lle, imobilizer brace applied. tolerating diet. good bed mobility. c/o pain right  upper side- medicated x1 with effective relief. monitor bld sugar per order. noon bs 123. no acute distress noted. cont to monitor.

## 2022-03-10 NOTE — THERAPY DISCHARGE NOTE
Acute Care - Speech Language Pathology Initial Evaluation/Discharge  [facility]     Patient Name: Ena Upton  : 1970  MRN: [id number]  Today's Date: 3/10/2022               Admit Date: 3/5/2022     Pt was seen for informal speech/language/cognitive evaluation. Upon arrival, pt was upright in bed and had just received meal tray. Pt alert and oriented x4 Pt stated that she was fatigued after completing dialysis but agreed to complete evaluation. Pt was prompted to complete automatic naming, confrontational naming, phrase and sentence completion, y/n questions, following directions, convergent and divergent naming tasks, problem solving, immediate recall, and analogies. Pt presented to be WFL on all tasks. Pt reported that she had most difficulty with delayed recall or recalling complex messages which was not assessed within the informal assessment. Pt stated that she frequently makes lists and writes things down so she can remember them. ST determined that pt would unlikely benefit from cognition therapy, but pt provided with internal and external memory strategy education to aid in functional memory tasks once she returns home. ST to sign off at this time as skilled services are not warranted. MD to consult if change or concern.    Carrie Maya, Speech Therapy Student  13:49 CST  03/10/22         Visit Dx:    ICD-10-CM ICD-9-CM   1. Altered mental status, unspecified altered mental status type  R41.82 780.97   2. Dysphagia, unspecified type  R13.10 787.20   3. Cognitive and behavioral changes  R41.89 799.59    R46.89 312.9     Patient Active Problem List   Diagnosis   • CKD stage 4 due to type 2 diabetes mellitus (HCC)   • Non-ketotic hyperosmolar coma (HCC)   • HTN (hypertension)   • Intractable nausea and vomiting   • Constipation   • Gastroparesis due to DM (HCC)   • Hyperglycemia   • Seizure (HCC)   • ESRD on hemodialysis (HCC)   • Sacral insufficiency fracture   • Hypertensive urgency   • Uncontrolled  type 2 diabetes mellitus with hyperglycemia (HCC)   • Hyperkalemia   • Metabolic encephalopathy   • Hypertensive emergency   • Acute pulmonary edema (HCC)   • Hypothyroidism   • HHNC (hyperglycemic hyperosmolar nonketotic coma) (HCC)   • Uremic encephalopathy syndrome   • Hyponatremia   • Elevated troponin due to ESRD and hypertensive urgency   • Acute respiratory failure secondary to an inability to protect her airway   • Tobacco abuse   • Anemia, chronic disease   • Nondisplaced spiral fracture of shaft of left femur, subsequent encounter for closed fracture with delayed healing     Past Medical History:   Diagnosis Date   • Anxiety    • Arthritis    • Chronic kidney disease     STAGE V RENAL FAILURE   • Depression    • Diabetes mellitus (HCC)    • HTN (hypertension)    • Hypothyroidism    • Obesity    • Tremors of nervous system      Past Surgical History:   Procedure Laterality Date   • ARTERIOVENOUS FISTULA     • BREAST BIOPSY     • CARPAL TUNNEL RELEASE     • CATH LAB PROCEDURE     • CHOLECYSTECTOMY     • TUBAL ABDOMINAL LIGATION         SLP Recommendation and Plan  SLP Diagnosis: Speech-language/cognition WFL (03/10/22 1300)                                                SLP EVALUATION (last 72 hours)     SLP SLC Evaluation     Row Name 03/10/22 1300 03/10/22 1221                Communication Assessment/Intervention    Document Type -- evaluation (P)   -JR       Subjective Information -- complains of;weakness (P)   -JR       Patient Observations -- alert;cooperative;agree to therapy (P)   -JR       Patient/Family/Caregiver Comments/Observations -- No family present (P)   -JR       Patient Effort good (P)   -JR --       Symptoms Noted During/After Treatment fatigue (P)   -JR --                General Information    Patient Profile Reviewed yes (P)   -JR --       Pertinent History Of Current Problem Primary problem: Metabolic encephalopathy (P)   -JR --       Precautions/Limitations, Vision WFL (P)   -JR --        Precautions/Limitations, Hearing WFL;for purposes of eval (P)   -JR --       Plans/Goals Discussed with patient (P)   -JR --       Barriers to Rehab cognitive status (P)   -JR --                Pain    Additional Documentation Pain Scale: FACES Pre/Post-Treatment (Group) (P)   -JR --                Pain Scale: FACES Pre/Post-Treatment    Pain: FACES Scale, Pretreatment 0-->no hurt (P)   -JR --       Posttreatment Pain Rating 0-->no hurt (P)   -JR --                Comprehension Assessment/Intervention    Comprehension Assessment/Intervention Auditory Comprehension (P)   -JR --                Auditory Comprehension Assessment/Intervention    Auditory Comprehension (Communication) WFL (P)   -JR --       Able to Identify Objects/Pictures (Communication) WFL (P)   -JR --       Answers Questions (Communication) WFL (P)   -JR --       Able to Follow Commands (Communication) L (P)   -JR --       Narrative Discourse L (P)   -JR --                Expression Assessment/Intervention    Expression Assessment/Intervention verbal expression (P)   -JR --                Verbal Expression Assessment/Intervention    Verbal Expression L (P)   -JR --       Automatic Speech (Communication) WFL (P)   -JR --       Repetition WFL (P)   -JR --       Phrase Completion WFL (P)   -JR --       Responsive Naming L (P)   -JR --       Confrontational Naming WFL (P)   -JR --       Spontaneous/Functional Words WFL (P)   -JR --       Sentence Formulation WFL (P)   -JR --       Conversational Discourse/Fluency L (P)   -JR --                Cognitive Assessment Intervention- SLP    Cognitive Function (Cognition) mild impairment (P)   delayed recall  -JR --       Orientation Status (Cognition) WFL (P)   -JR --       Memory (Cognitive) mild impairment (P)   -JR --       Attention (Cognitive) WFL (P)   -JR --       Thought Organization (Cognitive) WFL (P)   -JR --       Reasoning (Cognitive) WFL (P)   -JR --       Problem Solving (Cognitive)  WFL (P)   -JR --       Functional Math (Cognitive) -- (P)   N/a  -JR --       Executive Function (Cognition) WFL (P)   -JR --       Pragmatics (Communication) WFL (P)   -JR --                SLP Evaluation Clinical Impressions    SLP Diagnosis Speech-language/cognition [facility]  - --       Rehab Potential/Prognosis good (P)   -JR --       SLC Criteria for Skilled Therapy Interventions Met no problems identified which require skilled intervention (P)   -JR --       Functional Impact -- (P)   Pt independently implementing compensatory strategies  - --                Recommendations    Therapy Frequency (SLP SLC) evaluation only (P)   -JR --       Predicted Duration Therapy Intervention (Days) -- (P)   eval only  - --       Anticipated Discharge Disposition (SLP) unknown (P)   -JR --                SLP Discharge Summary    Discharge Destination unknown (P)   -JR --             User Key  (r) = Recorded By, (t) = Taken By, (c) = Cosigned By    Initials Name Effective Dates     Chelsea Nolen CCC-SLP 06/16/21 -     Carrie Friedman, Speech Therapy Student 01/06/22 -                    EDUCATION  The patient has been educated in the following areas:   Cognitive Impairment.                  Time Calculation:    Time Calculation- SLP     Row Name 03/10/22 1350             Time Calculation- [facility]    SLP Start Time 1221 (P)   -      SLP Stop Time 1302 (P)   -      SLP Time Calculation (min) 41 min (P)   -              Untimed Charges    SLP Eval/Re-eval  ST Eval Speech and Production w/ Language - 51110 (P)   -      [id number] Eval Speech and Production w/ Language Minutes 41 (P)   -              Total Minutes    Untimed Charges Total Minutes 41 (P)   -       Total Minutes 41 (P)   -            User Key  (r) = Recorded By, (t) = Taken By, (c) = Cosigned By    Initials Name Provider Type    Carrie Friedman, Speech Therapy Student Speech Therapy Student                Therapy Charges for Today     Code Description  Service Date Service Provider Modifiers Qty    [id number] [facility] EVAL SPEECH AND PROD W LANG  3 3/10/2022 Chelsea Nolen, CCC-SLP GN 1                        Chelsea Nolen CCC-SLP  3/10/2022

## 2022-03-10 NOTE — PLAN OF CARE
Pt was seen for informal speech/language/cognitive evaluation. Upon arrival, pt was upright in bed and had just received meal tray. Pt alert and oriented x4 Pt stated that she was fatigued after completing dialysis but agreed to complete evaluation. Pt was prompted to complete automatic naming, confrontational naming, phrase and sentence completion, y/n questions, following directions, convergent and divergent naming tasks, problem solving, immediate recall, and analogies. Pt presented to be WFL on all tasks. Pt reported that she had most difficulty with delayed recall or recalling complex messages which was not assessed within the informal assessment. Pt stated that she frequently makes lists and writes things down so she can remember them. ST determined that pt would unlikely benefit from cognition therapy, but pt provided with internal and external memory strategy education to aid in functional memory tasks once she returns home. ST to sign off at this time as skilled services are not warranted. MD to re-consult if change or concern.    Carrie Maya, Speech Therapy Student  13:49 CST  03/10/22            Goal Outcome Evaluation:  Plan of Care Reviewed With: (P) patient

## 2022-03-10 NOTE — CONSULTS
Have attempted to see pt on several occassions.  She is out of room for Dialysis now.  Will  Cont to follow to see before discharge.

## 2022-03-10 NOTE — PROGRESS NOTES
"Orthopaedic Surgery Consult Note      3/10/2022   06:22 CST    Name:  Ena Upton  MRN:    [id number]     Acct:     [id number]   Room:  [facility]/  IP Day: 5     Admit Date: 3/5/2022  PCP: Yaron Wiggins APRN    Consulting Physician: Dr. Thomas Oliver    Subjective:     C/C: Left knee/thigh pain    HPI: Ms. Upton is a 52-year-old female who was admitted on 3/5/2022 due to acute encephalopathy after being found down at home, with concern for missing hemodialysis which has been a chronic problem for her.  She was intubated in the ER and subsequently extubated the following day.  Her clinical condition has improved.  She is known to have a left distal metadiaphyseal femur fracture which was treated by one of my partners conservatively toward the end of January at [facility].  She sustained that injury after falling out of bed per her report.  She was instructed to be nonweightbearing but states that she has ambulated on this leg since original injury.  She has not returned for follow-up, stating \"nothing was arranged\".  Today, she feels that her leg pain has improved but not resolved.  She reports 8/10 pain with repositioning.  She tolerates gentle knee and hip range of motion without significant discomfort.  Denies any numbness or tingling to the left lower extremity.      Code Status:    Code Status and Medical Interventions:   Ordered at: 03/07/22 0841     Level Of Support Discussed With:    Patient     Code Status (Patient has no pulse and is not breathing):    CPR (Attempt to Resuscitate)     Medical Interventions (Patient has pulse or is breathing):    Full Support         Past Medical History:    Past Medical History:   Diagnosis Date   • Anxiety    • Arthritis    • Chronic kidney disease     STAGE V RENAL FAILURE   • Depression    • Diabetes mellitus (HCC)    • HTN (hypertension)    • Hypothyroidism    • Obesity    • Tremors of nervous system        Past Surgical History:    Past Surgical History: "   Procedure Laterality Date   • ARTERIOVENOUS FISTULA     • BREAST BIOPSY     • CARPAL TUNNEL RELEASE     • CATH LAB PROCEDURE     • CHOLECYSTECTOMY     • TUBAL ABDOMINAL LIGATION         Current Medications:   Prior to Admission medications    Medication Sig Start Date End Date Taking? Authorizing Provider   acetaminophen (TYLENOL) 325 MG tablet Take 2 tablets by mouth Every 6 (Six) Hours As Needed for Mild Pain . 10/12/21   Harrison Moya MD   albuterol (PROVENTIL HFA;VENTOLIN HFA) 108 (90 BASE) MCG/ACT inhaler Inhale 2 puffs Every 4 (Four) Hours As Needed for wheezing.    Blanche Neil MD   cefuroxime (CEFTIN) 250 MG tablet Take 250 mg by mouth Daily.    Blanche Neil MD   cloNIDine (CATAPRES-TTS) 0.1 MG/24HR patch Place 1 patch on the skin as directed by provider 1 (One) Time Per Week.    Blanche Neil MD   DULoxetine (CYMBALTA) 60 MG capsule Take 60 mg by mouth daily.    Blanche Neil MD   famotidine (PEPCID) 20 MG tablet Take 20 mg by mouth Daily.    Blanche Neil MD   hydrALAZINE (APRESOLINE) 50 MG tablet Take 50 mg by mouth 3 (Three) Times a Day.    Blanche Neil MD   insulin aspart (novoLOG FLEXPEN) 100 UNIT/ML solution pen-injector sc pen Inject 14 Units under the skin into the appropriate area as directed 3 (Three) Times a Day With Meals.    Blanche Neil MD   Insulin Glargine (BASAGLAR KWIKPEN) 100 UNIT/ML injection pen Inject 70 Units under the skin into the appropriate area as directed Every Night.    Blanche Neil MD   isosorbide mononitrate (IMDUR) 120 MG 24 hr tablet Take 120 mg by mouth Daily.    Blanche Neil MD   lactulose (CHRONULAC) 10 GM/15ML solution Take 10 g by mouth 3 (Three) Times a Day With Meals. For constipation - HOLD for loose stools    Blanche Neil MD   levothyroxine (SYNTHROID, LEVOTHROID) 200 MCG tablet Take 200 mcg by mouth Daily.    Blanche Neil MD   minoxidil (LONITEN) 2.5 MG  "tablet Take 2.5 mg by mouth Daily.    Blanche Neil MD   ondansetron (ZOFRAN) 4 MG tablet Take 4 mg by mouth Every 8 (Eight) Hours As Needed for Nausea or Vomiting.    Blanche Neil MD   senna (senna) 8.6 MG tablet Take 1 tablet by mouth Daily.    Blanche Neil MD   simvastatin (ZOCOR) 20 MG tablet Take 20 mg by mouth Daily.    Blanche Neil MD   vitamin D (ERGOCALCIFEROL) 1.25 MG (48406 UT) capsule capsule Take 50,000 Units by mouth Every 14 (Fourteen) Days.    Blanche Neil MD       Allergies:  Penicillins, Lamisil [terbinafine], Reglan [metoclopramide], and Stadol [butorphanol]    Social History:   Social History     Socioeconomic History   • Marital status:    Tobacco Use   • Smoking status: Current Every Day Smoker     Packs/day: 0.50   • Smokeless tobacco: Never Used   • Tobacco comment: CUTTING BACK AND TRYING TO STOP SMOKING   Substance and Sexual Activity   • Alcohol use: No   • Drug use: No   • Sexual activity: Defer       Family History:   Family History   Problem Relation Age of Onset   • Cancer Other    • Hypertension Other    • Kidney disease Other    • Heart disease Other    • Diabetes Other    • Diabetes Mother    • Diabetes Maternal Aunt    • Diabetes Maternal Grandmother            REVIEW OF SYSTEMS:    Complete review of systems was reviewed today and is unremarkable except for HPI.        Vitals:  /60 (BP Location: Right arm, Patient Position: Lying)   Pulse 61   Temp 97.5 °F (36.4 °C) (Oral)   Resp 16   Ht 157.5 cm (62\")   Wt 78.9 kg (174 lb)   SpO2 92%   BMI 31.83 kg/m²   Temp (24hrs), Av.9 °F (36.6 °C), Min:97.5 °F (36.4 °C), Max:98.3 °F (36.8 °C)      I/O (24Hr):    Intake/Output Summary (Last 24 hours) at 3/10/2022 0622  Last data filed at 3/9/2022 1829  Gross per 24 hour   Intake 626 ml   Output --   Net 626 ml       Labs:  Lab Results (last 72 hours)     Procedure Component Value Units Date/Time    POC Glucose Once " [[id number]]  (Normal) Collected: 03/10/22 0443    Specimen: Blood Updated: 03/10/22 0454     Glucose 130 mg/dL      Comment: : [id number] Otis RachelMeter ID: [id number]       POC Glucose Once [[id number]]  (Abnormal) Collected: 03/10/22 0028    Specimen: Blood Updated: 03/10/22 0044     Glucose 213 mg/dL      Comment: : [id number] Otis RachelMeter ID: [id number]       POC Glucose Once [[id number]]  (Abnormal) Collected: 03/09/22 2041    Specimen: Blood Updated: 03/09/22 2057     Glucose 172 mg/dL      Comment: : [id number] Otis RachelMeter ID: [id number]       POC Glucose Once [[id number]]  (Abnormal) Collected: 03/09/22 1747    Specimen: Blood Updated: 03/09/22 1758     Glucose 220 mg/dL      Comment: : [id number] Reny SableMeter ID: [id number]       POC Glucose Once [[id number]]  (Abnormal) Collected: 03/09/22 1236    Specimen: Blood Updated: 03/09/22 1248     Glucose 184 mg/dL      Comment: : [id number] Reny SableMeter ID: [id number]       Blood Culture - Blood, Arm, Left [[id number]]  (Normal) Collected: 03/05/22 1010    Specimen: Blood from Arm, Left Updated: 03/09/22 1102     Blood Culture No growth at 4 days    POC Glucose Once [[id number]]  (Normal) Collected: 03/09/22 0829    Specimen: Blood Updated: 03/09/22 0840     Glucose 121 mg/dL      Comment: : [id number] Reny SableMeter ID: [id number]       Basic Metabolic Panel [[id number]]  (Abnormal) Collected: 03/09/22 0559    Specimen: Blood Updated: 03/09/22 0720     Glucose 121 mg/dL      BUN 29 mg/dL      Creatinine 3.62 mg/dL      Sodium 132 mmol/L      Potassium 4.5 mmol/L      Chloride 95 mmol/L      CO2 24.0 mmol/L      Calcium 8.2 mg/dL      BUN/Creatinine Ratio 8.0     Anion Gap 13.0 mmol/L      eGFR 14.4 mL/min/1.73      Comment: <15 Indicative of kidney failure       Narrative:      GFR Normal >60  Chronic Kidney Disease <60  Kidney Failure <15      CBC (No Diff) [[id number]]  (Abnormal) Collected: 03/09/22 0559    Specimen:  Blood Updated: 03/09/22 0651     WBC 4.61 10*3/mm3      RBC 2.70 10*6/mm3      Hemoglobin 7.6 g/dL      Hematocrit 24.7 %      MCV 91.5 fL      MCH 28.1 pg      MCHC 30.8 g/dL      RDW 17.4 %      RDW-SD 58.7 fl      MPV 10.5 fL      Platelets 182 10*3/mm3     POC Glucose Once [[id number]]  (Abnormal) Collected: 03/09/22 0434    Specimen: Blood Updated: 03/09/22 0445     Glucose 152 mg/dL      Comment: : [id number] Britta Hester) LaDonnaMeter ID: [id number]       POC Glucose Once [[id number]]  (Abnormal) Collected: 03/09/22 0002    Specimen: Blood Updated: 03/09/22 0023     Glucose 155 mg/dL      Comment: : [id number] Jaja ChristianleeMeter ID: [id number]       POC Glucose Once [[id number]]  (Abnormal) Collected: 03/08/22 1948    Specimen: Blood Updated: 03/08/22 2009     Glucose 157 mg/dL      Comment: : [id number] Jaja ChristianleeMeter ID: [id number]       POC Glucose Once [[id number]]  (Normal) Collected: 03/08/22 1601    Specimen: Blood Updated: 03/08/22 1612     Glucose 89 mg/dL      Comment: : [id number] Adwoa CaraMeter ID: [id number]       POC Glucose Once [[id number]]  (Normal) Collected: 03/08/22 1348    Specimen: Blood Updated: 03/08/22 1400     Glucose 73 mg/dL      Comment: : [id number] Adwoa CaraMeter ID: [id number]       POC Glucose Once [[id number]]  (Abnormal) Collected: 03/08/22 0755    Specimen: Blood Updated: 03/08/22 0808     Glucose 28 mg/dL      Comment: : [id number] Rachelleernora CaraMeter ID: [id number]       POC Glucose Once [[id number]]  (Abnormal) Collected: 03/08/22 0742    Specimen: Blood Updated: 03/08/22 0753     Glucose 42 mg/dL      Comment: : [id number] Rachelleernora CaraMeter ID: [id number]       POC Glucose Once [[id number]]  (Abnormal) Collected: 03/08/22 0741    Specimen: Blood Updated: 03/08/22 0753     Glucose 32 mg/dL      Comment: : [id number] Adwoa CaraMeter ID: [id number]       Blood Culture - Blood, Arm, Right [[id number]]  (Abnormal) Collected: 03/05/22 1022     Specimen: Blood from Arm, Right Updated: 03/08/22 0555     Blood Culture Staphylococcus, coagulase negative     Isolated from Anaerobic Bottle     Gram Stain Anaerobic Bottle Gram positive cocci in clusters    Narrative:      Probable contaminant requires clinical correlation, susceptibility not performed unless requested by physician.      POC Glucose Once [[id number]]  (Normal) Collected: 03/08/22 0423    Specimen: Blood Updated: 03/08/22 0435     Glucose 95 mg/dL      Comment: : [id number] Moreno CasandraMeter ID: [id number]       POC Glucose Once [[id number]]  (Abnormal) Collected: 03/08/22 0102    Specimen: Blood Updated: 03/08/22 0113     Glucose 212 mg/dL      Comment: : [id number] Moreno CasandraMeter ID: [id number]       POC Glucose Once [[id number]]  (Abnormal) Collected: 03/07/22 2158    Specimen: Blood Updated: 03/07/22 2210     Glucose 257 mg/dL      Comment: : [id number] Omreno CasandraMeter ID: [id number]       POC Glucose Once [[id number]]  (Abnormal) Collected: 03/07/22 1511    Specimen: Blood Updated: 03/07/22 1523     Glucose 159 mg/dL      Comment: : [id number] BridgetYangaroo CaraMeter ID: [id number]       POC Glucose Once [[id number]]  (Abnormal) Collected: 03/07/22 1314    Specimen: Blood Updated: 03/07/22 1325     Glucose 140 mg/dL      Comment: : SABINE Walsh BrandyMeter ID: [id number]       POC Glucose Once [[id number]]  (Normal) Collected: 03/07/22 1021    Specimen: Blood Updated: 03/07/22 1033     Glucose 107 mg/dL      Comment: : SABINE Jillian BrandyMeter ID: [id number]       POC Glucose Once [[id number]]  (Normal) Collected: 03/07/22 0817    Specimen: Blood Updated: 03/07/22 0828     Glucose 90 mg/dL      Comment: : SABINE Walsh BrandyMeter ID: [id number]       POC Glucose Once [[id number]]  (Normal) Collected: 03/07/22 0714    Specimen: Blood Updated: 03/07/22 0726     Glucose 81 mg/dL      Comment: : SABINE Streeter ID:  [id number]       POC Glucose Once [[id number]]  (Normal) Collected: 03/07/22 0619    Specimen: Blood Updated: 03/07/22 0631     Glucose 80 mg/dL      Comment: : [id number] Murphy ID: [id number]             Radiology:  Imaging Results (Last 72 Hours)     Procedure Component Value Units Date/Time    XR Femur 2 View Left [[id number]] Collected: 03/08/22 1721     Updated: 03/08/22 1725    Narrative:      HISTORY: Pain     Left femur: 2 views left femur are obtained.     There is a spiral type fracture involving the left distal femoral  diaphysis 6 extending to the metaphysis. No prominent displacement.  Proximal and mid left femur are intact. Diffuse vascular calcification.       Impression:      1. Oblique/spiral type fracture of the left distal femoral diaphysis  extending to the metaphysis. No significant displacement.  This report was finalized on 03/08/2022 17:22 by Dr. Desire Castro MD.    XR Pelvis 1 or 2 View [[id number]] Collected: 03/08/22 1624     Updated: 03/08/22 1628    Narrative:      EXAMINATION: XR PELVIS 1 OR 2 VW- 3/8/2022 4:24 PM CST     HISTORY: Pain.     REPORT: An AP view the pelvis was obtained.     COMPARISON: There are no correlative imaging studies for comparison.     The images are underpenetrated, limiting of the hips appears normal and  no fracture is identified. There is no evidence of AVN. The osseous  structures appear osteopenic. Moderate stool volume is present. There  are scattered phleboliths in the pelvis.       Impression:      Limited exam due to underpenetration and possible diffuse  osteopenia. No acute acute osseous abnormality is identified.  This report was finalized on 03/08/2022 16:25 by Dr. Farhad Munoz MD.          Physical Exam:     PHYSICAL EXAM:      Physical Examination:  Vitals:   Vitals:    03/09/22 2250 03/09/22 2334 03/10/22 0446 03/10/22 0618   BP: 124/62 134/62 132/60    BP Location:  Right arm Right arm    Patient Position:  Lying Lying     Pulse: 63 64 64 61   Resp:  16 18 16   Temp:  98.3 °F (36.8 °C) 97.5 °F (36.4 °C)    TempSrc:  Oral Oral    SpO2:  94% 95% 92%   Weight:       Height:         General:  Appears stated age, no distress.  Orientation:  Alert and oriented to time, place, and person.  Mood and Affect:  Cooperative and pleasant.  HEENT: Head is normocephalic, atraumatic.  No gross visual or auditory acuity deficits.  Poor dentition.  Gait:  Resting comfortably in bed.  Cardiovascular:  Symmetric 1-2 plus pulses in upper and lower extremities.  Sensation:  Grossly intact to light touch.  Coordination/balance:  Normal  Musculoskeletal:  Right upper extremity exam:  There is no tenderness to palpation about the shoulder, elbow, wrist or hand.  Unrestricted full function motion is present.  Stability is normal with provocative tests, 5/5 strength, and skin is normal.      Left upper extremity exam:  There is no tenderness to palpation about the shoulder, elbow, wrist or hand.  Unrestricted full function motion is present.  Stability is normal with provocative tests, 5/5 strength, and skin is normal.     Right lower extremity exam:  There is no tenderness to palpation about the hip, knee, ankle or foot.  Unrestricted full function motion is present.  Stability is normal with provocative tests, 5/5 strength, and skin is normal.     Left lower extremity exam: No gross deformity to the left lower extremity.  No significant edema, erythema or ecchymosis.  No open skin wounds or lesions.  Tenderness to palpation about the distal third of the left thigh and knee with no associated bony crepitus.  No significant tenderness to palpation to the proximal femur, tibia/fibula, ankle or foot.  She tolerates gentle passive knee flexion and extension with mild discomfort.  No pain with logroll.  EHL, FHL, tibialis anterior and gastrocsoleus complex motor intact.  Left foot is warm and perfused.    Radiology Review  My review of patient's x-rays shows the  patient to have nondisplaced distal third spiral metadiaphyseal femur fracture with questionable callus formation along the lateral cortex.    Assessment:   53 y/o F approximately 6 weeks s/p nondisplaced L distal metadiaphyseal spiral femur fracture which remains nondisplaced despite reports of weightbearing, significant medical history for acute metabolic encephalopathy-now improved, poorly controlled type 2 diabetes mellitus, end-stage renal disease on hemodialysis, anemia of chronic disease and hypothyroidism    Plan:   1.  Left distal femur fracture: Recommend continued conservative management.  Would recommend knee immobilizer when active and during sleep but may unstrap immobilizer while immobile, throughout the day.  Okay for gentle knee range of motion with PT.  Nonweightbearing to left lower extremity-this was stressed to her extensively this morning citing the risk for fracture displacement necessitating surgery.  Due to her medical comorbidities, I explained to her that she is at high risk for perioperative complications and we would like to prevent surgery if at all possible.  Okay for discharge from orthopedic standpoint with follow-up as outpatient with either myself or Dr. Mazariegos.  Consider referral to bone health clinic as outpatient.  Will follow peripherally, please call with any questions/concerns.

## 2022-03-10 NOTE — PROGRESS NOTES
[facility]  INPATIENT PROGRESS NOTE    Patient Name: Ena Upton  Date of Admission: 3/5/2022  Today's Date: 03/10/22  Length of Stay: 5  Primary Care Physician: Yaron Wiggins APRN    Subjective   Chief Complaint: Weakness.   HPI   Dr. Hatfield with ortho saw her. He recommends a knee immobilizer when active and during sleep but may unstrap immobilizer while immobile, throughout the day.  Okay for gentle knee range of motion with PT.  Nonweightbearing to left lower extremity.    She really needs SNF placement.  She basically lives by herself.  She states that some friends can stay with her when she needs to.  She does not want to go back to skilled nursing.  She was recently at [facility] and had a horrible time there.  She states that her insurance also does not want to pay for her being at skilled nursing.  She wants to go home tomorrow with home health.    Blood sugars stable off of dextrose and basal insulin.    Review of Systems   All pertinent negatives and positives are as above. All other systems have been reviewed and are negative unless otherwise stated.     Objective    Temp:  [97.5 °F (36.4 °C)-98.3 °F (36.8 °C)] 97.9 °F (36.6 °C)  Heart Rate:  [57-73] 71  Resp:  [16-18] 16  BP: (124-166)/(57-67) 166/58  Physical Exam  Constitutional:       Appearance: She is well-developed. She is ill-appearing (chronically).      Comments: Up in bed, eating lunch.  No distress.  No family present.  Discussed with her nurse, Alejandra.    HENT:      Head: Normocephalic and atraumatic.   Eyes:      Conjunctiva/sclera: Conjunctivae normal.      Pupils: Pupils are equal, round, and reactive to light.   Neck:      Vascular: No JVD.   Cardiovascular:      Rate and Rhythm: Normal rate and regular rhythm.      Heart sounds: Normal heart sounds.      Comments: Sinus rhythm in the 70s.  Pulmonary:      Effort: Pulmonary effort is normal. No respiratory distress.       Breath sounds: Normal breath sounds.      Comments: On room air.   Chest:      Chest wall: No tenderness.   Abdominal:      General: Bowel sounds are normal. There is no distension.      Palpations: Abdomen is soft.      Tenderness: There is no abdominal tenderness.   Musculoskeletal:         General: No tenderness or deformity. Normal range of motion.      Cervical back: Neck supple.   Skin:     General: Skin is warm and dry.      Findings: No rash.   Neurological:      General: No focal deficit present.      Mental Status: She is alert and oriented to person, place, and time.      Cranial Nerves: No cranial nerve deficit.      Motor: Weakness present. No abnormal muscle tone.      Deep Tendon Reflexes: Reflexes normal.   Psychiatric:      Comments: More conversational today.        Results Review:  I have reviewed the labs, radiology results, and diagnostic studies.    Laboratory Data:   Results from last 7 days   Lab Units 03/09/22  0559 03/07/22  0426 03/05/22  1237   WBC 10*3/mm3 4.61 7.43 8.12   HEMOGLOBIN g/dL 7.6* 8.3* 9.1*   HEMATOCRIT % 24.7* 26.3* 28.7*   PLATELETS 10*3/mm3 182 156 151     Results from last 7 days   Lab Units 03/09/22  0559 03/07/22  0426 03/06/22  1840 03/06/22  0420 03/06/22  0024 03/05/22  1237 03/05/22  1042   SODIUM mmol/L 132* 128* 130*   < > 132*   < > 119*   POTASSIUM mmol/L 4.5 5.1 4.6   < > 3.2*   < > 4.3   CHLORIDE mmol/L 95* 93* 94*   < > 96*   < > 82*   CO2 mmol/L 24.0 22.0 23.0   < > 25.0   < > 21.0*   BUN mg/dL 29* 36* 33*   < > 28*   < > 49*   CREATININE mg/dL 3.62* 4.01* 3.63*   < > 3.09*   < > 4.66*   CALCIUM mg/dL 8.2* 8.1* 8.6   < > 8.2*   < > 8.4*   BILIRUBIN mg/dL  --  0.4  --   --  0.3  --  0.4   ALK PHOS U/L  --  103  --   --  85  --  138*   ALT (SGPT) U/L  --  13  --   --  15  --  23   AST (SGOT) U/L  --  23  --   --  17  --  38*   GLUCOSE mg/dL 121* 117* 95   < > 188*   < > 972*    < > = values in this interval not displayed.     Culture Data:   Blood Culture    Date Value Ref Range Status   03/05/2022 Staphylococcus, coagulase negative (C)  Preliminary   03/05/2022 No growth at 24 hours  Preliminary     Urine Culture   Date Value Ref Range Status   03/05/2022 50,000 CFU/mL Staphylococcus, coagulase negative (A)  Final     Comment:     By laboratory criteria, addititional testing is not indicated and unlikely to produce clinically relevant information.  Coagulase negative staphylococci are common skin contaminants, members of the oral lilly,  and of low virulence; requires clinical correlation.       I have reviewed the patient's current medications.     Assessment/Plan     Active Hospital Problems    Diagnosis    • **Metabolic encephalopathy    • HHNC (hyperglycemic hyperosmolar nonketotic coma) ([facility])    • Uncontrolled type 2 diabetes mellitus with hyperglycemia ([facility])    • Hypertensive emergency    • ESRD on hemodialysis ([facility])    • Hyponatremia    • Hyperkalemia    • Nondisplaced spiral fracture of shaft of left femur, subsequent encounter for closed fracture with delayed healing    • Acute respiratory failure secondary to an inability to protect her airway    • Tobacco abuse    • Anemia, chronic disease    • Elevated troponin due to ESRD and hypertensive urgency    • Hypothyroidism      Plan:  The patient was admitted on 3/5 by Dr. Prado.  She was found unresponsive.  EMS arrived on the scene and placed an LMA.  She was ultimately intubated in the emergency department.  She had a blood sugar of greater than 900 on arrival and was also severely hypertensive.  She has end-stage renal disease and does dialysis on Tuesday, Thursday, and Saturday.    She required ventilatory support in the emergency department secondary to altered mental status and inability to protect her airway.  She was extubated on Sunday, 3/6.  She has been evaluated by pulmonary.  She is currently on room air.    Nephrology consulted to assist with dialysis.  She received dialysis on Saturday in the  intensive care unit setting.  She required a medical treatment for hyperkalemia on the afternoon of 3/6 and also received a dose of Lokelma.  Potassium level 5.1 today.    She has significant problems with hypertension and is on multiple medications for this.  Dr. Prado had not been able to resume all of them.  She has previously required Cardene. I resumed Imdur, Procardia, and pushed her carvedilol and hydralazine back to her typical doses on the morning of 3/7.  Placed back on her clonidine patch.  She is no longer on lisinopril or spironolactone per recent medication reconciliation.    She had 1 bottle positive for Staphylococcus that was coagulase-negative.  Stopped vancomycin on 3/7. Consider this to be a contaminant.  She has also recently been on ceftriaxone.  This was also stopped to monitor.     She was initially on an insulin drip.  This was removed on 3/6.  She was reordered long-acting insulin and sliding scale insulin.  However, she has had some problems with hypoglycemia and was recently placed on dextrose fluids, which we stopped 3/9.  Completely stopped long-acting insulin for now.  Her most recent hemoglobin A1c was 12.4 in January 2022.    Plain films of the left femur continues to show an oblique/spiral type fracture of the left distal femoral diaphysis extending to the metaphysis with no significant displacement.  The patient had been told to be nonweightbearing at the [facility] nursing facility.  She has not seen orthopedics since the fracture was first discovered at [facility] in January. Dr. Hatfield with ortho saw her. He recommends a knee immobilizer when active and during sleep but may unstrap immobilizer while immobile, throughout the day.  Okay for gentle knee range of motion with PT.  Nonweightbearing to left lower extremity.    PT/OT.     Offered a nicotine patch and counseled for tobacco cessation.    Reconciled and resumed other home medications as appropriate.    Speech therapy cleared  for regular foods with thin liquids on 3/7.    Pepcid for peptic ulcer prophylaxis.    Lovenox for DVT prophylaxis.    Transferred to the floor on 3/7.    Discharge Planning: I expect the patient to be discharged home with home health versus SNF in 1-2 days.  She is adamant that she not return to skilled nursing.    Electronically signed by Thomas Oliver DO, 03/10/22, 12:33 CST.

## 2022-03-11 NOTE — THERAPY EVALUATION
Patient Name: Ena Upton  : 1970    MRN: [id number]                              Today's Date: 3/11/2022       Admit Date: 3/5/2022    Visit Dx:     ICD-10-CM ICD-9-CM   1. Altered mental status, unspecified altered mental status type  R41.82 780.97   2. Dysphagia, unspecified type  R13.10 787.20   3. Cognitive and behavioral changes  R41.89 799.59    R46.89 312.9   4. Gait abnormality  R26.9 781.2   5. Decreased activities of daily living (ADL)  Z78.9 V49.89     Patient Active Problem List   Diagnosis   • CKD stage 4 due to type 2 diabetes mellitus (Formerly [facility])   • Non-ketotic hyperosmolar coma (Formerly [facility])   • HTN (hypertension)   • Intractable nausea and vomiting   • Constipation   • Gastroparesis due to DM ([facility]ly [facility])   • Hyperglycemia   • Seizure ([facility]ly [facility])   • ESRD on hemodialysis (Formerly [facility])   • Sacral insufficiency fracture   • Hypertensive urgency   • Uncontrolled type 2 diabetes mellitus with hyperglycemia ([facility]ly [facility])   • Hyperkalemia   • Metabolic encephalopathy   • Hypertensive emergency   • Acute pulmonary edema ([facility])   • Hypothyroidism   • HHNC (hyperglycemic hyperosmolar nonketotic coma) (Formerly [facility])   • Uremic encephalopathy syndrome   • Hyponatremia   • Elevated troponin due to ESRD and hypertensive urgency   • Acute respiratory failure secondary to an inability to protect her airway   • Tobacco abuse   • Anemia, chronic disease   • Nondisplaced spiral fracture of shaft of left femur, subsequent encounter for closed fracture with delayed healing     Past Medical History:   Diagnosis Date   • Anxiety    • Arthritis    • Chronic kidney disease     STAGE V RENAL FAILURE   • Depression    • Diabetes mellitus (HCC)    • HTN (hypertension)    • Hypothyroidism    • Obesity    • Tremors of nervous system      Past Surgical History:   Procedure Laterality Date   • ARTERIOVENOUS FISTULA     • BREAST BIOPSY     • CARPAL TUNNEL RELEASE     • CATH LAB PROCEDURE     • CHOLECYSTECTOMY     • TUBAL ABDOMINAL LIGATION        General Information      Row Name 03/11/22 08          Physical Therapy Time and Intention    Document Type evaluation;other (see comments)  see MAR  -SUKUMAR     Mode of Treatment physical therapy  -     Row Name 03/11/22 0832          General Information    Patient Profile Reviewed yes  -SUKUMAR     Prior Level of Function independent:;ADL's;all household mobility;community mobility;cooking;cleaning;min assist:;using stairs;mod assist:;shopping;dependent:;driving  reports she has a w/c, rwx and std. wx, straight cane, and shower chair (tub shower); reports she uses a w/c if she has to stand for very long; has used it most of the time since her L LE in Jan;  -     Existing Precautions/Restrictions fall;non-weight bearing;left  L LE  -     Barriers to Rehab medically complex;previous functional deficit  -     Row Name 03/11/22 0832          Living Environment    People in Home alone;other (see comments)  reports she has friends that can come and stay with her at times  -     Row Name 03/11/22 08          Home Main Entrance    Number of Stairs, Main Entrance four  -JE     Stair Railings, Main Entrance railings on both sides of stairs  -     Row Name 03/11/22 08          Stairs Within Home, Primary    Number of Stairs, Within Home, Primary none  -     Row Name 03/11/22 0832          Cognition    Orientation Status (Cognition) oriented x 4  -     Row Name 03/11/22 0832          Safety Issues, Functional Mobility    Safety Issues Affecting Function (Mobility) awareness of need for assistance;friction/shear risk;safety precaution awareness;safety precautions follow-through/compliance;unable to maintain weight-bearing restrictions  -     Impairments Affecting Function (Mobility) balance;endurance/activity tolerance;pain;range of motion (ROM);strength;sensation/sensory awareness  -           User Key  (r) = Recorded By, (t) = Taken By, (c) = Cosigned By    Initials Name Provider Type    Amairani Ricketts, PT Physical Therapist                Mobility     Row Name 03/11/22 0832          Bed Mobility    Bed Mobility scooting/bridging;supine-sit  -JE     Scooting/Bridging [place] (Bed Mobility) standby assist;independent  -JE     Supine-Sit [place] (Bed Mobility) standby assist;independent  -     Assistive Device (Bed Mobility) bed rails  -     Comment, (Bed Mobility) increase time and effort; knee immobilizer L LE  -JE     Row Name 03/11/22 0832          Bed-Chair Transfer    Bed-Chair [place] (Transfers) minimum assist (75% patient effort);2 person assist;verbal cues  -     Assistive Device (Bed-Chair Transfers) walker, front-wheeled  -SUKUMAR     Row Name 03/11/22 0832          Sit-Stand Transfer    Sit-Stand [place] (Transfers) minimum assist (75% patient effort);2 person assist;verbal cues  -     Assistive Device (Sit-Stand Transfers) walker, front-wheeled  -     Row Name 03/11/22 0832          Gait/Stairs (Locomotion)    Distance in Feet (Gait) steps bed to chair w/ rwx, NWB L LE  -     Comment, (Gait/Stairs) difficulty maintaining NWB L LE, putting wt through foot twice during mvmt bed to chair, however possibly less than 25% of wt  -     Row Name 03/11/22 0832          Mobility    Extremity Weight-bearing Status left lower extremity   L LE NWB  -           User Key  (r) = Recorded By, (t) = Taken By, (c) = Cosigned By    Initials Name Provider Type    Amairani Ricketts, PT Physical Therapist               Obj/Interventions     Row Name 03/11/22 0832          Range of Motion Comprehensive    Comment, General Range of Motion AROM B UEs WFLs, R LE WFLS; L LE w/ knee immobilizer in place, however this was removed and AROM assessed - L knee slightly greater than 90 degrees, knee extension WFLs  -     Row Name 03/11/22 0832          Strength Comprehensive (MMT)    Comment, General Manual Muscle Testing (MMT) Assessment defer to OT for formal UE strength assessment, R LE grossly 4+/5, L LE not assessed at this  time due to NWB status  -     Row Name 03/11/22 0832          Balance    Balance Assessment sitting static balance;sitting dynamic balance;sit to stand dynamic balance;standing static balance;standing dynamic balance  -     Static Sitting Balance supervision;independent  -     Dynamic Sitting Balance supervision;independent  -     Position, Sitting Balance supported;unsupported;sitting edge of bed;sitting in chair  -     Sit to Stand Dynamic Balance minimal assist;2-person assist;verbal cues  -     Static Standing Balance contact guard;2-person assist  -     Dynamic Standing Balance minimal assist;2-person assist  -     Position/Device Used, Standing Balance supported;walker, rolling  -     Row Name 03/11/22 0832          Sensory Assessment (Somatosensory)    Sensory Assessment (Somatosensory) other (see comments)  c/os N/T in hands, feet and legs which mostly all the time  -           User Key  (r) = Recorded By, (t) = Taken By, (c) = Cosigned By    Initials Name Provider Type    Amairani Ricketts, PT Physical Therapist               Goals/Plan     Row Name 03/11/22 0832          Bed Mobility Goal 1 (PT)    Activity/Assistive Device (Bed Mobility Goal 1, PT) bed mobility activities, all  -     [place] Level/Cues Needed (Bed Mobility Goal 1, PT) independent  -     Time Frame (Bed Mobility Goal 1, PT) long term goal (LTG);10 days  -     Progress/Outcomes (Bed Mobility Goal 1, PT) goal ongoing  -     Row Name 03/11/22 0832          Transfer Goal 1 (PT)    Activity/Assistive Device (Transfer Goal 1, PT) sit-to-stand/stand-to-sit;bed-to-chair/chair-to-bed;walker, rolling  -     [place] Level/Cues Needed (Transfer Goal 1, PT) contact guard required;standby assist  -     Time Frame (Transfer Goal 1, PT) long term goal (LTG);10 days  -     Strategies/Barriers (Transfers Goal 1, PT) maintaining NWB L LE  -JE     Progress/Outcome (Transfer Goal 1, PT) goal ongoing  -     Row  Name 03/11/22 0832          Stairs Goal 1 (PT)    Activity/Assistive Device (Stairs Goal 1, PT) ascending stairs;descending stairs;using handrail, left;using handrail, right;step-to-step;decrease fall risk;improve balance and speed;maintain weight-bearing status  -     [place] Level/Cues Needed (Stairs Goal 1, PT) minimum assist (75% or more patient effort)  -     Number of Stairs (Stairs Goal 1, PT) 4  -     Time Frame (Stairs Goal 1, PT) long term goal (LTG);10 days  -JE     Progress/Outcome (Stairs Goal 1, PT) goal ongoing  -     Row Name 03/11/22 0832          Problem Specific Goal 1 (PT)    Problem Specific Goal 1 (PT) I w/ wheelchair mobility/mechanics functional distances  -     Time Frame (Problem Specific Goal 1, PT) long-term goal (LTG);other (see comments)  10 days  -     Progress/Outcome (Problem Specific Goal 1, PT) goal ongoing  -     Row Name 03/11/22 0832          Patient Education Goal (PT)    Activity (Patient Education Goal, PT) pt I w/ mgmt of knee immobilizer  -     [place]/Cues/Accuracy (Memory Goal 2, PT) demonstrates adequately;independent;verbalizes understanding  -     Time Frame (Patient Education Goal, PT) long term goal (LTG);10 days  -     Progress/Outcome (Patient Education Goal, PT) goal ongoing  -           User Key  (r) = Recorded By, (t) = Taken By, (c) = Cosigned By    Initials Name Provider Type    Amairani Ricketts, PT Physical Therapist               Clinical Impression     Row Name 03/11/22 0832          Pain    Pretreatment Pain Rating 8/10  -     Posttreatment Pain Rating 8/10  -     Pre/Posttreatment Pain Comment R ribs, L LE - reports she was given something for pain around 6:30 am  -     Pain Intervention(s) Ambulation/increased activity;Repositioned;Rest  -     Row Name 03/11/22 0832          Plan of Care Review    Plan of Care Reviewed With patient  -     Progress improving  -     Outcome Evaluation PT eval completed.  Pt  highly motivated and eager to return home, wanting to go home today.  Pt w/ L femur fracture since Jan and continues w/ NWB status L LE.  Pt w/ knee immobilizer in place and w/ orders ok to remove brace in order to begin gentle ROM L LE, but brace to be on otherwise when active or when asleep; per Dr. Hatfield's noted, brace can be unstrapped when immobile during the day.  Pt performs bed mobility w/ SBA, tfers w/ assist of 2 and for bed to chair activitiy w/ assist of 2.  Pt had 2 episodes of not being able to maintain NWB L LE and put less than 25% of ther wt through L LE, however able to correct and remove wt when cues given.  Pt will benefit from continued PT services to improve activity tolerance, strength, balance, safety awareness, and to improve mobility maintaining NWB L LE.  Pt wants to go home and reports she has a friend who will stay w/ her 24/7 through Monday.  Advised pt she needs 24/7 care and would benefit from subacute rehab vs SNF.  However, pt is not in agreement w/ going anywhere.  Will follow for progress and needs; if pt goes home she would benefit from .  -     Row Name 03/11/22 0832          Therapy Assessment/Plan (PT)    Patient/Family Therapy Goals Statement (PT) return home  -JE     Rehab Potential (PT) good, to achieve stated therapy goals  -     Criteria for Skilled Interventions Met (PT) yes;meets criteria;skilled treatment is necessary  -     Predicted Duration of Therapy Intervention (PT) until discharge or goals achieved  -     Row Name 03/11/22 0832          Vital Signs    Pretreatment Heart Rate (beats/min) 71  -JE     Pre SpO2 (%) 93  -JE     O2 Delivery Pre Treatment room air  -JE     O2 Delivery Intra Treatment room air  -JE     O2 Delivery Post Treatment room air  -JE     Pre Patient Position Supine  -JE     Intra Patient Position Standing  -JE     Post Patient Position Sitting  -JE     Row Name 03/11/22 0832          Positioning and Restraints    Pre-Treatment  Position in bed  -JE     Post Treatment Position chair  -JE     In Chair reclined;call light within reach;encouraged to call for assist;with brace;legs elevated;LLE elevated  waffle cushion  -SUKUMAR           User Key  (r) = Recorded By, (t) = Taken By, (c) = Cosigned By    Initials Name Provider Type    Amairani Ricketts, PT Physical Therapist               Outcome Measures     Row Name 03/11/22 0832          How much help from another person do you currently need...    Turning from your back to your side while in flat bed without using bedrails? 4  -JE     Moving from lying on back to sitting on the side of a flat bed without bedrails? 4  -JE     Moving to and from a bed to a chair (including a wheelchair)? 3  -JE     Standing up from a chair using your arms (e.g., wheelchair, bedside chair)? 3  -JE     Climbing 3-5 steps with a railing? 2  -JE     To walk in hospital room? 3  -JE     AM-PAC 6 Clicks Score (PT) 19  -     Row Name 03/11/22 0832          Functional Assessment    Outcome Measure Options AM-PAC 6 Clicks Basic Mobility (PT)  -           User Key  (r) = Recorded By, (t) = Taken By, (c) = Cosigned By    Initials Name Provider Type    Amairani Ricketts, PT Physical Therapist                             Physical Therapy Education                 Title: PT OT SLP Therapies (In Progress)     Topic: Physical Therapy (In Progress)     Point: Mobility training (Done)     Learning Progress Summary           Patient Acceptance, E,TB,D, BULMARO,DU,NR by  at 3/11/2022 0933    Comment: Education re: purpose of PT/importance of activity, for safety/falls prevention, for improved tech w/ tfers sit to/from stand, moving bed to chair, for proper use of rwx, NWB L LE, wearing schedule of brace, proper positioning of brace & mgmt of brace                   Point: Home exercise program (Done)     Learning Progress Summary           Patient Acceptance, E,TB,D, BULMARO,DU,NR by  at 3/11/2022 0933    Comment: Education re:  purpose of PT/importance of activity, for safety/falls prevention, for improved tech w/ tfers sit to/from stand, moving bed to chair, for proper use of rwx, NWB L LE, wearing schedule of brace, proper positioning of brace & mgmt of brace                   Point: Body mechanics (Not Started)     Learner Progress:  Not documented in this visit.          Point: Precautions (Done)     Learning Progress Summary           Patient Acceptance, E,TB,D, VU,DU,NR by SUKUMAR at 3/11/2022 0936    Comment: Education re: purpose of PT/importance of activity, for safety/falls prevention, for improved tech w/ tfers sit to/from stand, moving bed to chair, for proper use of rwx, NWB L LE, wearing schedule of brace, proper positioning of brace & mgmt of brace                               User Key     Initials Effective Dates Name Provider Type Discipline    SUKUMAR 08/02/18 -  Amairani Nieves, PT Physical Therapist PT              PT Recommendation and Plan  Planned Therapy Interventions (PT): balance training, bed mobility training, patient/family education, ROM (range of motion), stair training, transfer training, other (see comments) (safety/falls prevention, brace mgmt, NWB L LE)  Plan of Care Reviewed With: patient  Progress: improving  Outcome Evaluation: PT eval completed.  Pt highly motivated and eager to return home, wanting to go home today.  Pt w/ L femur fracture since Jan and continues w/ NWB status L LE.  Pt w/ knee immobilizer in place and w/ orders ok to remove brace in order to begin gentle ROM L LE, but brace to be on otherwise when active or when asleep; per Dr. Hatfield's noted, brace can be unstrapped when immobile during the day.  Pt performs bed mobility w/ SBA, tfers w/ assist of 2 and for bed to chair activitiy w/ assist of 2.  Pt had 2 episodes of not being able to maintain NWB L LE and put less than 25% of ther wt through L LE, however able to correct and remove wt when cues given.  Pt will benefit from continued PT  services to improve activity tolerance, strength, balance, safety awareness, and to improve mobility maintaining NWB L LE.  Pt wants to go home and reports she has a friend who will stay w/ her 24/7 through Monday.  Advised pt she needs 24/7 care and would benefit from subacute rehab vs SNF.  However, pt is not in agreement w/ going anywhere.  Will follow for progress and needs; if pt goes home she would benefit from HH.     Time Calculation:    PT Charges     Row Name 03/11/22 1412 03/11/22 0941          Time Calculation    Start Time 1350  - 0832  -     Stop Time 1408  - 0941  -     Time Calculation (min) 18 min  -AH 69 min  -     PT Received On -- 03/11/22  -     PT Goal Re-Cert Due Date -- 03/21/22  -            Time Calculation- PT    Total Timed Code Minutes- PT 18 minute(s)  -AH --            Timed Charges    61683 - PT Therapeutic Activity Minutes 18  -AH --            Total Minutes    Timed Charges Total Minutes 18  -AH --      Total Minutes 18  -AH --           User Key  (r) = Recorded By, (t) = Taken By, (c) = Cosigned By    Initials Name Provider Type    Mirian Draper, PTA Physical Therapy Assistant    Amairani Ricketts, PT Physical Therapist              Therapy Charges for Today     Code Description Service Date Service Provider Modifiers Qty    30365593209 HC PT EVAL MOD COMPLEXITY 4 3/11/2022 Amairani Nieves, PT GP 1    36552279208 HC PT THERAPEUTIC ACT EA 15 MIN 3/11/2022 Amairani Nieves, PT GP 1          PT G-Codes  Outcome Measure Options: AM-PAC 6 Clicks Basic Mobility (PT)  AM-PAC 6 Clicks Score (PT): 19    Amairani Nieves PT  3/11/2022

## 2022-03-11 NOTE — PLAN OF CARE
Goal Outcome Evaluation:  Plan of Care Reviewed With: patient        Progress: no change  Outcome Evaluation: OT eval completed. AxO x4. C/o pain in L hip that radiates down into L LE and pain in R ribs. SBA for all bed mobility w HOB elevated and bed rails for support. Requires increased time and effort to come to EOB d/t weakness and knee immobilizer on L LE. No deficits identified in B UE ROM or coordination. Diminished sensation on L UE and reports B LE and B UE numbness and tingling that is becoming more consistent. Noted fistula in L UE near elbow that constantly vibrates. Pt reports diminished  strength and an increase in dropping items out of hands. B UE  strength grossly 3/5. All other B UE joints grossly 4/5. MinAx2 w vc for all t/f. Requires vc to maintain NWB L LE status and is able to self-correct when instructed. Pt would benefit from skilled OT services to increase functional strength and endurance, safety awareness/compliance, balance, and to increase independence in ADLs and to decrease fall risk. Discussed d/c planning and pt states that her preference is to return home and that she has friends that are willing to come stay w her around the clock to assist. If friends unable to come stay, inpatient rehab is recommended to increase strength for t/f and ADLs prior to returning home. Recommended d/c home w 24/7 care and home health vs inpatient rehab pending pt progress.

## 2022-03-11 NOTE — THERAPY EVALUATION
Patient Name: Ena Upton  : 1970    MRN: [id number]                              Today's Date: 3/11/2022       Admit Date: 3/5/2022    Visit Dx:     ICD-10-CM ICD-9-CM   1. Altered mental status, unspecified altered mental status type  R41.82 780.97   2. Dysphagia, unspecified type  R13.10 787.20   3. Cognitive and behavioral changes  R41.89 799.59    R46.89 312.9   4. Gait abnormality  R26.9 781.2   5. Decreased activities of daily living (ADL)  Z78.9 V49.89     Patient Active Problem List   Diagnosis   • CKD stage 4 due to type 2 diabetes mellitus (Formerly [facility])   • Non-ketotic hyperosmolar coma ([facility]ly [facility])   • HTN (hypertension)   • Intractable nausea and vomiting   • Constipation   • Gastroparesis due to DM ([facility]ly [facility])   • Hyperglycemia   • Seizure ([facility]ly [facility])   • ESRD on hemodialysis (Formerly [facility])   • Sacral insufficiency fracture   • Hypertensive urgency   • Uncontrolled type 2 diabetes mellitus with hyperglycemia ([facility])   • Hyperkalemia   • Metabolic encephalopathy   • Hypertensive emergency   • Acute pulmonary edema ([facility])   • Hypothyroidism   • HHNC (hyperglycemic hyperosmolar nonketotic coma) (Formerly [facility])   • Uremic encephalopathy syndrome   • Hyponatremia   • Elevated troponin due to ESRD and hypertensive urgency   • Acute respiratory failure secondary to an inability to protect her airway   • Tobacco abuse   • Anemia, chronic disease   • Nondisplaced spiral fracture of shaft of left femur, subsequent encounter for closed fracture with delayed healing     Past Medical History:   Diagnosis Date   • Anxiety    • Arthritis    • Chronic kidney disease     STAGE V RENAL FAILURE   • Depression    • Diabetes mellitus (HCC)    • HTN (hypertension)    • Hypothyroidism    • Obesity    • Tremors of nervous system      Past Surgical History:   Procedure Laterality Date   • ARTERIOVENOUS FISTULA     • BREAST BIOPSY     • CARPAL TUNNEL RELEASE     • CATH LAB PROCEDURE     • CHOLECYSTECTOMY     • TUBAL ABDOMINAL LIGATION        General Information      Row Name 03/11/22 0845          OT Time and Intention    Document Type evaluation  -MW (r)  (t) MW (c)     Mode of Treatment occupational therapy  -MW (r)  (t) KALEN (c)     Row Name 03/11/22 0810          General Information    Patient Profile Reviewed yes  -MW (r)  (t) MW (c)     Prior Level of Function independent:;bed mobility;ADL's;home management;dependent:;driving;shopping;min assist:;all household mobility;community mobility  single cane, rolling walker, standard walker, and w/c. Friends and transport services for driving. Dep for shopping on friends. Requires assistance occasionally to get down front steps.  -MW (r)  (t) MW (c)     Existing Precautions/Restrictions fall;seizures;non-weight bearing;brace worn when out of bed   NWB L LE; L knee immobilizer when OOB  -MW (r)  (t) MW (c)     Barriers to Rehab medically complex;previous functional deficit  -MW (r)  (t) MW (c)     Row Name 03/11/22 0838          Occupational Profile    Reason for Services/Referral (Occupational Profile) Presents to ED after being found unresponsive, elevated blood glucose, and a GCS of 3. dx altered mental status and L distal femur fx, PMHx of CKD stage V renal failure, arthritis, diabetes mellitus, HTN, hypothyroidism, and tremors of nervous system  -MW (r)  (t) MW (c)     Environmental Supports and Barriers (Occupational Profile) tub shower w shower chair, bedside commode  -MW (r)  (t) MW (c)     Row Name 03/11/22 0845          Living Environment    People in Home alone;other (see comments)  Has friends who come over to stay with her from time to time  -MW (r)  (t) MW (c)     Row Name 03/11/22 0845          Home Main Entrance    Number of Stairs, Main Entrance four  -MW (r)  (t) MW (c)     Stair Railings, Main Entrance railings on both sides of stairs  -MW (r)  (t) MW (c)     Row Name 03/11/22 0845          Stairs Within Home, Primary    Number of Stairs, Within Home, Primary none  -MW (r)  (t) MW (c)      Row Name 03/11/22 0845          Cognition    Orientation Status (Cognition) oriented x 4  -MW (r) JR (t) MW (c)     Row Name 03/11/22 0845          Safety Issues, Functional Mobility    Safety Issues Affecting Function (Mobility) awareness of need for assistance;safety precaution awareness;safety precautions follow-through/compliance;unable to maintain weight-bearing restrictions  -MW (r) JR (t) MW (c)     Impairments Affecting Function (Mobility) balance;endurance/activity tolerance;pain;range of motion (ROM);strength;sensation/sensory awareness  -MW (r) JR (t) MW (c)           User Key  (r) = Recorded By, (t) = Taken By, (c) = Cosigned By    Initials Name Provider Type    MW Neda Guzman, OTR/L Occupational Therapist    Dana Tai, OT Student OT Student                 Mobility/ADL's     Row Name 03/11/22 0845          Bed Mobility    Bed Mobility scooting/bridging;supine-sit  -MW (r) JR (t) MW (c)     Scooting/Bridging [place] (Bed Mobility) standby assist  -MW (r) JR (t) MW (c)     Supine-Sit [place] (Bed Mobility) standby assist  -MW (r) JR (t) MW (c)     Assistive Device (Bed Mobility) bed rails;head of bed elevated  -MW (r) JR (t) MW (c)     Comment, (Bed Mobility) increased time and effort to come to EOB; L LE knee immobilizer  -MW (r) JR (t) MW (c)     Row Name 03/11/22 0845          Transfers    Transfers sit-stand transfer;bed-chair transfer  -MW (r) JR (t) MW (c)     Bed-Chair [place] (Transfers) minimum assist (75% patient effort);2 person assist;verbal cues  -MW (r) JR (t) MW (c)     Assistive Device (Bed-Chair Transfers) walker, front-wheeled  -MW (r) JR (t) MW (c)     Sit-Stand [place] (Transfers) minimum assist (75% patient effort);2 person assist;verbal cues  -MW (r) JR (t) MW (c)     Row Name 03/11/22 0845          Sit-Stand Transfer    Assistive Device (Sit-Stand Transfers) walker, front-wheeled  -MW (r) JR (t) MW (c)     Row Name 03/11/22 0845          Activities  "of Daily Living    BADL Assessment/Intervention lower body dressing;grooming  -MW (r) JR (t) MW (c)     Row Name 03/11/22 0845          Mobility    Extremity Weight-bearing Status left lower extremity  -MW (r) JR (t) MW (c)     Left Lower Extremity (Weight-bearing Status) non weight-bearing (NWB)  -MW (r) JR (t) MW (c)     Row Name 03/11/22 0845          Lower Body Dressing Assessment/Training    [place] Level (Lower Body Dressing) don;socks;maximum assist (25% patient effort)  -MW (r) JR (t) MW (c)     Comment, (Lower Body Dressing) Requires maxA to don socks at EOB d/t decreased balance and pain  -MW (r) JR (t) MW (c)     Row Name 03/11/22 0845          Grooming Assessment/Training    [place] Level (Grooming) wash face, hands;set up  -MW (r) JR (t) MW (c)     Position (Grooming) supported sitting  -MW (r) JR (t) MW (c)           User Key  (r) = Recorded By, (t) = Taken By, (c) = Cosigned By    Initials Name Provider Type    MW Neda Guzman, OTR/L Occupational Therapist    Dana Tai, OT Student OT Student               Obj/Interventions     Row Name 03/11/22 0845          Sensory Assessment (Somatosensory)    Sensory Assessment (Somatosensory) right-sided sensation intact  -MW (r) JR (t) MW (c)     Sensory Assessment L UE diminished light touch; reports numbness and tingling in BLEs and BUEs and states that it is \"getting to the point where it is all the time\"; fistula noted on L elbow that has constant tingling.  -MW (r) JR (t) MW (c)     Row Name 03/11/22 0845          Sensory Interventions    Comment, Sensory Intervention --  -MW (r) JR (t) MW (c)     Row Name 03/11/22 0845          Vision Assessment/Intervention    Visual Impairment/Limitations corrective lenses for reading  -MW (r) JR (t) MW (c)     Row Name 03/11/22 0845          Range of Motion Comprehensive    General Range of Motion bilateral upper extremity ROM WFL  -MW (r) JR (t) MW (c)     Row Name 03/11/22 0845          Strength " Comprehensive (MMT)    General Manual Muscle Testing (MMT) Assessment no strength deficits identified  -MW (r) JR (t) MW (c)     Comment, General Manual Muscle Testing (MMT) Assessment BUE grossly 4/5; BUE  strength grossly 3/5  -MW (r) JR (t) MW (c)     Row Name 03/11/22 0845          Motor Skills    Motor Skills coordination  -MW (r) JR (t) MW (c)     Coordination WFL;finger to nose  B UEs finger opposition and finger to nose assessments WFL; reports dropping things out of hands d/t increasing weakness and diminished grasp.  -MW (r) JR (t) MW (c)     Row Name 03/11/22 0845          Balance    Balance Assessment sitting static balance;sitting dynamic balance;standing static balance;standing dynamic balance  -MW (r) JR (t) MW (c)     Static Sitting Balance independent  -MW (r) JR (t) MW (c)     Dynamic Sitting Balance supervision  -MW (r) JR (t) MW (c)     Static Standing Balance 2-person assist;verbal cues;contact guard  -MW (r) JR (t) MW (c)     Dynamic Standing Balance 2-person assist;verbal cues;contact guard  -MW (r) JR (t) MW (c)     Position/Device Used, Standing Balance walker, front-wheeled  -MW (r) JR (t) MW (c)           User Key  (r) = Recorded By, (t) = Taken By, (c) = Cosigned By    Initials Name Provider Type    MW Neda Guzman, OTR/L Occupational Therapist    Dana Tai, OT Student OT Student               Goals/Plan     Row Name 03/11/22 0801          Transfer Goal 1 (OT)    Activity/Assistive Device (Transfer Goal 1, OT) transfers, all;toilet;shower chair;tub  -MW (r) JR (t) MW (c)     [place] Level/Cues Needed (Transfer Goal 1, OT) standby assist  -MW (r) JR (t) MW (c)     Time Frame (Transfer Goal 1, OT) long term goal (LTG);10 days  -MW (r) JR (t) MW (c)     Progress/Outcome (Transfer Goal 1, OT) goal ongoing  -MW (r) JR (t) MW (c)     Row Name 03/11/22 0845          Dressing Goal 1 (OT)    Activity/Device (Dressing Goal 1, OT) lower body dressing  -MW (r) JR (t) KALEN (c)      [place]/Cues Needed (Dressing Goal 1, OT) minimum assist (75% or more patient effort)  -MW (r) JR (t) MW (c)     Time Frame (Dressing Goal 1, OT) long term goal (LTG);10 days  -MW (r) JR (t) MW (c)     Progress/Outcome (Dressing Goal 1, OT) goal ongoing  -MW (r) JR (t) MW (c)     Row Name 03/11/22 08          Toileting Goal 1 (OT)    Activity/Device (Toileting Goal 1, OT) toileting skills, all  -MW (r) JR (t) MW (c)     [place] Level/Cues Needed (Toileting Goal 1, OT) minimum assist (75% or more patient effort)  -MW (r) JR (t) MW (c)     Time Frame (Toileting Goal 1, OT) long term goal (LTG);10 days  -MW (r) JR (t) MW (c)     Progress/Outcome (Toileting Goal 1, OT) goal ongoing  -MW (r) JR (t) MW (c)     Row Name 03/11/22 0845          Therapy Assessment/Plan (OT)    Planned Therapy Interventions (OT) activity tolerance training;adaptive equipment training;BADL retraining;functional balance retraining;occupation/activity based interventions;patient/caregiver education/training;strengthening exercise;transfer/mobility retraining;orthotic fabrication/fitting/training  -MW (r) JR (t) MW (c)           User Key  (r) = Recorded By, (t) = Taken By, (c) = Cosigned By    Initials Name Provider Type    MW Neda Guzman, OTR/L Occupational Therapist    Dana Tai, OT Student OT Student               Clinical Impression     Row Name 03/11/22 0845          Pain Assessment    Pretreatment Pain Rating 8/10  -MW (r) JR (t) MW (c)     Posttreatment Pain Rating 8/10  -MW (r) JR (t) MW (c)     Pain Location - Side/Orientation Left  -MW (r) JR (t) MW (c)     Pain Location lower  -MW (r) JR (t) MW (c)     Pain Location - extremity  -MW (r) JR (t) MW (c)     Pre/Posttreatment Pain Comment Pain in L LE that radiates down leg and c/o pain in R ribs  -MW (r) JR (t) MW (c)     Pain Intervention(s) Medication (See MAR);Repositioned;Ambulation/increased activity  -KALEN (r)  (t) KALEN (c)     Row Name 03/11/22 0855           Plan of Care Review    Plan of Care Reviewed With patient  -KALEN (r)  (t) KALEN (c)     Progress no change  -KALEN (r)  (t) KALEN (c)     Outcome Evaluation OT eval completed. AxO x4. C/o pain in L hip that radiates down into L LE and pain in R ribs. SBA for all bed mobility w HOB elevated and bed rails for support. Requires increased time and effort to come to EOB d/t weakness and knee immobilizer on L LE. No deficits identified in B UE ROM or coordination. Diminished sensation on L UE and reports B LE and B UE numbness and tingling that is becoming more consistent. Noted fistula in L UE near elbow that constantly vibrates. Pt reports diminished  strength and an increase in dropping items out of hands. B UE  strength grossly 3/5. All other B UE joints grossly 4/5. MinAx2 w vc for all t/f. Requires vc to maintain NWB L LE status and is able to self-correct when instructed. Pt would benefit from skilled OT services to increase functional strength and endurance, safety awareness/compliance, balance, and to increase independence in ADLs and to decrease fall risk. Discussed d/c planning and pt states that her preference is to return home and that she has friends that are willing to come stay w her around the clock to assist. If friends unable to come stay, inpatient rehab is recommended to increase strength for t/f and ADLs prior to returning home. Recommended d/c home w 24/7 care and home health vs inpatient rehab pending pt progress.  -KALEN (r)  (t) KALEN (c)     Row Name 03/11/22 0832          Therapy Assessment/Plan (OT)    Rehab Potential (OT) good, to achieve stated therapy goals  -KALEN (r)  (gonzalez) KALEN (c)     Criteria for Skilled Therapeutic Interventions Met (OT) yes;skilled treatment is necessary  -KALEN (r)  (gonzalez) KALEN (c)     Therapy Frequency (OT) 5 times/wk  -KALEN (r)  (t) KALEN (c)     Predicted Duration of Therapy Intervention (OT) 10 days  -KALEN (r)  (t) KALEN (c)     Row Name 03/11/22 0889          Therapy Plan  Review/Discharge Plan (OT)    Anticipated Discharge Disposition (OT) home with 24/7 care;inpatient rehabilitation facility;home with home health  -MW (r) JR (t) MW (c)     Row Name 03/11/22 0845          Vital Signs    Pretreatment Heart Rate (beats/min) 71  -MW (r) JR (t) MW (c)     Pre SpO2 (%) 93  -MW (r) JR (t) MW (c)     O2 Delivery Pre Treatment room air  -MW (r) JR (t) MW (c)     Pre Patient Position Supine  -MW (r) JR (t) MW (c)     Row Name 03/11/22 0845          Positioning and Restraints    Pre-Treatment Position in bed  -MW (r) JR (t) MW (c)     Post Treatment Position chair  -MW (r) JR (t) MW (c)     In Chair reclined;call light within reach;encouraged to call for assist;waffle cushion;LLE elevated;L knee immobilizer  -MW (r) JR (t) MW (c)           User Key  (r) = Recorded By, (t) = Taken By, (c) = Cosigned By    Initials Name Provider Type    Neda Greene, OTR/L Occupational Therapist    Dana Tai, OT Student OT Student               Outcome Measures     Row Name 03/11/22 0832          How much help from another person do you currently need...    Turning from your back to your side while in flat bed without using bedrails? 4  -JE     Moving from lying on back to sitting on the side of a flat bed without bedrails? 4  -JE     Moving to and from a bed to a chair (including a wheelchair)? 3  -JE     Standing up from a chair using your arms (e.g., wheelchair, bedside chair)? 3  -JE     Climbing 3-5 steps with a railing? 2  -JE     To walk in hospital room? 3  -JE     AM-PAC 6 Clicks Score (PT) 19  -JE     Row Name 03/11/22 0832          Functional Assessment    Outcome Measure Options AM-PAC 6 Clicks Basic Mobility (PT)  -JE           User Key  (r) = Recorded By, (t) = Taken By, (c) = Cosigned By    Initials Name Provider Type    Amairani Ricketts, PT Physical Therapist                Occupational Therapy Education                 Title: PT OT SLP Therapies (In Progress)     Topic:  Occupational Therapy (Done)     Point: ADL training (Done)     Description:   Instruct learner(s) on proper safety adaptation and remediation techniques during self care or transfers.   Instruct in proper use of assistive devices.              Learning Progress Summary           Patient Acceptance, E, VU,NR by  at 3/11/2022 0948                   Point: Home exercise program (Done)     Description:   Instruct learner(s) on appropriate technique for monitoring, assisting and/or progressing therapeutic exercises/activities.              Learning Progress Summary           Patient Acceptance, E, VU,NR by  at 3/11/2022 0948                   Point: Precautions (Done)     Description:   Instruct learner(s) on prescribed precautions during self-care and functional transfers.              Learning Progress Summary           Patient Acceptance, E, VU,NR by  at 3/11/2022 0948                               User Key     Initials Effective Dates Name Provider Type Discipline     12/28/21 -  Dana Woo, OT Student OT Student OT              OT Recommendation and Plan  Planned Therapy Interventions (OT): activity tolerance training, adaptive equipment training, BADL retraining, functional balance retraining, occupation/activity based interventions, patient/caregiver education/training, strengthening exercise, transfer/mobility retraining, orthotic fabrication/fitting/training  Therapy Frequency (OT): 5 times/wk  Plan of Care Review  Plan of Care Reviewed With: patient  Progress: no change  Outcome Evaluation: OT eval completed. AxO x4. C/o pain in L hip that radiates down into L LE and pain in R ribs. SBA for all bed mobility w HOB elevated and bed rails for support. Requires increased time and effort to come to EOB d/t weakness and knee immobilizer on L LE. No deficits identified in B UE ROM or coordination. Diminished sensation on L UE and reports B LE and B UE numbness and tingling that is becoming more consistent.  Noted fistula in L UE near elbow that constantly vibrates. Pt reports diminished  strength and an increase in dropping items out of hands. B UE  strength grossly 3/5. All other B UE joints grossly 4/5. MinAx2 w vc for all t/f. Requires vc to maintain NWB L LE status and is able to self-correct when instructed. Pt would benefit from skilled OT services to increase functional strength and endurance, safety awareness/compliance, balance, and to increase independence in ADLs and to decrease fall risk. Discussed d/c planning and pt states that her preference is to return home and that she has friends that are willing to come stay w her around the clock to assist. If friends unable to come stay, inpatient rehab is recommended to increase strength for t/f and ADLs prior to returning home. Recommended d/c home w 24/7 care and home health vs inpatient rehab pending pt progress.     Time Calculation:    Time Calculation- OT     Row Name 03/11/22 0845             Time Calculation- OT    OT Start Time 0841  + 15 min chart review  -MW (r) JR (t) MW (c)      OT Stop Time 0930  -MW (r) JR (t) MW (c)      OT Time Calculation (min) 49 min  -MW (r) JR (t)      OT Received On 03/11/22  -MW (r) JR (t) MW (c)      OT Goal Re-Cert Due Date 03/21/22  -MW (r) JR (t) MW (c)            User Key  (r) = Recorded By, (t) = Taken By, (c) = Cosigned By    Initials Name Provider Type    Neda Greene, OTR/L Occupational Therapist    Dana Tai, OT Student OT Student                       Dana Woo, OT Student  3/11/2022

## 2022-03-11 NOTE — THERAPY TREATMENT NOTE
Acute Care - Physical Therapy Treatment Note  [facility]     Patient Name: Ena Upton  : 1970  MRN: [id number]  Today's Date: 3/11/2022      Visit Dx:     ICD-10-CM ICD-9-CM   1. Altered mental status, unspecified altered mental status type  R41.82 780.97   2. Dysphagia, unspecified type  R13.10 787.20   3. Cognitive and behavioral changes  R41.89 799.59    R46.89 312.9   4. 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)   • Non-ketotic hyperosmolar coma (Formerly [facility])   • HTN (hypertension)   • Intractable nausea and vomiting   • Constipation   • Gastroparesis due to DM (Formerly [facility])   • Hyperglycemia   • Seizure (Formerly [facility])   • ESRD on hemodialysis (Formerly [facility])   • Sacral insufficiency fracture   • Hypertensive urgency   • Uncontrolled type 2 diabetes mellitus with hyperglycemia (Formerly [facility])   • Hyperkalemia   • Metabolic encephalopathy   • Hypertensive emergency   • Acute pulmonary edema (Formerly [facility])   • Hypothyroidism   • HHNC (hyperglycemic hyperosmolar nonketotic coma) (Formerly [facility])   • Uremic encephalopathy syndrome   • Hyponatremia   • Elevated troponin due to ESRD and hypertensive urgency   • Acute respiratory failure secondary to an inability to protect her airway   • Tobacco abuse   • Anemia, chronic disease   • Nondisplaced spiral fracture of shaft of left femur, subsequent encounter for closed fracture with delayed healing     Past Medical History:   Diagnosis Date   • Anxiety    • Arthritis    • Chronic kidney disease     STAGE V RENAL FAILURE   • Depression    • Diabetes mellitus (HCC)    • HTN (hypertension)    • Hypothyroidism    • Obesity    • Tremors of nervous system      Past Surgical History:   Procedure Laterality Date   • ARTERIOVENOUS FISTULA     • BREAST BIOPSY     • CARPAL TUNNEL RELEASE     • CATH LAB PROCEDURE     • CHOLECYSTECTOMY     • TUBAL ABDOMINAL LIGATION       PT Assessment (last 12 hours)     PT Evaluation and Treatment     Row Name  03/11/22 [facility]0          Physical Therapy Time and Intention    Subjective Information complains of;pain;weakness  -     Document Type therapy note (daily note)  -     Mode of Treatment physical therapy  -     Comment RN reports ok for KI to be off in bed  -[facility] Name 03/11/22 1350          General Information    Existing Precautions/Restrictions fall;brace worn when out of bed;non-weight bearing;left  left KI when out of bed  -[facility] Name 03/11/22 1350          Mobility    Left Lower Extremity (Weight-bearing Status) non weight-bearing (NWB)   -[facility] Name 03/11/22 1350          Bed Mobility    Bed Mobility supine-sit;sit-supine  -     Supine-Sit [place] (Bed Mobility) --  chair  -     Sit-Supine [place] (Bed Mobility) minimum assist (75% patient effort);verbal cues  -[facility] Name 03/11/22 1350          Transfers    Transfers chair-bed transfer  -     Chair-Bed [place] (Transfers) minimum assist (75% patient effort);verbal cues  -     Sit-Stand [place] (Transfers) minimum assist (75% patient effort);verbal cues  -[facility] Name 03/11/22 1350          Chair-Bed Transfer    Assistive Device (Chair-Bed Transfers) walker, front-wheeled  -[facility] Name 03/11/22 1350          Gait/Stairs (Locomotion)    [place] Level (Gait) contact guard;minimum assist (75% patient effort);verbal cues  [facility]     Assistive Device (Gait) walker, front-wheeled  -     Distance in Feet (Gait) steps chair-bed  -[facility] Name             Wound 03/05/22 2000 Right coccyx Pressure Injury    Wound - Properties Group Placement Date: 03/05/22 -KS Placement Time: 2000  -KS Present on Hospital Admission: Y  -KS Side: Right  -KS Location: coccyx  -KS Primary Wound Type: Pressure inj  -KS     Retired Wound - Properties Group Placement Date: 03/05/22 -KS Placement Time: 2000  -KS Present on Hospital Admission: Y  -KS Side: Right  -KS Location: coccyx  -KS Primary Wound Type: Pressure inj  -KS      Retired Wound - Properties Group Date first assessed: 03/05/22  -KS Time first assessed: 2000 -KS Present on Hospital Admission: Y  -KS Side: Right  -KS Location: coccyx  -KS Primary Wound Type: Pressure inj  -KS     Row Name 03/11/22 8898          Positioning and Restraints    Pre-Treatment Position sitting in chair/recliner  -AH     Post Treatment Position bed  -     In Bed fowlers;call light within reach;encouraged to call for assist;exit alarm on  RN reports ok for KI to be off in bed  -           User Key  (r) = Recorded By, (t) = Taken By, (c) = Cosigned By    Initials Name Provider Type     Mirian Crabtree, PTA Physical Therapy Assistant    Monica Adams, RN Registered Nurse                Physical Therapy Education                 Title: PT OT SLP Therapies (In Progress)     Topic: Physical Therapy (In Progress)     Point: Mobility training (Done)     Learning Progress Summary           Patient Acceptance, E,TB,D, VU,DU,NR by SUKUMAR at 3/11/2022 0933    Comment: Education re: purpose of PT/importance of activity, for safety/falls prevention, for improved tech w/ tfers sit to/from stand, moving bed to chair, for proper use of rwx, NWB L LE, wearing schedule of brace, proper positioning of brace & mgmt of brace                   Point: Home exercise program (Done)     Learning Progress Summary           Patient Acceptance, E,TB,D, VU,DU,NR by SUKUMAR at 3/11/2022 0933    Comment: Education re: purpose of PT/importance of activity, for safety/falls prevention, for improved tech w/ tfers sit to/from stand, moving bed to chair, for proper use of rwx, NWB L LE, wearing schedule of brace, proper positioning of brace & mgmt of brace                   Point: Body mechanics (Not Started)     Learner Progress:  Not documented in this visit.          Point: Precautions (Done)     Learning Progress Summary           Patient Acceptance, E,TB,D, VU,DU,NR by SUKUMAR at 3/11/2022 0933    Comment: Education re: purpose of  PT/importance of activity, for safety/falls prevention, for improved tech w/ tfers sit to/from stand, moving bed to chair, for proper use of rwx, NWB L LE, wearing schedule of brace, proper positioning of brace & mgmt of brace                               User Key     Initials Effective Dates Name Provider Type [facility] 08/02/18 -  Amairani Nieves, PT Physical Therapist PT              PT Recommendation and Plan             Time Calculation:    PT Charges     Row Name 03/11/22 1412 03/11/22 0941          Time Calculation    Start Time 1350  - 0832  -     Stop Time 1408  - 0941  -     Time Calculation (min) 18 min  -AH 69 min  -     PT Received On -- 03/11/22  -     PT Goal Re-Cert Due Date -- 03/21/22  -            Time Calculation- PT    Total Timed Code Minutes- PT 18 minute(s)  -AH --            Timed Charges    03511 - PT Therapeutic Activity Minutes 18  -AH --            Total Minutes    Timed Charges Total Minutes 18  -AH --      Total Minutes 18  -AH --           User Key  (r) = Recorded By, (t) = Taken By, (c) = Cosigned By    Initials Name Provider Type     Mirian Crabtree PTA Physical Therapy Assistant    Amairani Ricketts, PT Physical Therapist              Therapy Charges for Today     Code Description Service Date Service Provider Modifiers Qty    [id number]  PT THERAPEUTIC ACT EA 15 MIN 3/11/2022 Mirian Crabtree PTA GP 1          PT G-Codes  Outcome Measure Options: AM-PAC 6 Clicks Basic Mobility (PT)  AM-PAC 6 Clicks Score (PT): 19    Mirian Crabtree PTA  3/11/2022

## 2022-03-11 NOTE — CASE MANAGEMENT/SOCIAL WORK
Continued Stay Note  [facility]     Patient Name: Ena Upton  MRN: [id number]  Today's Date: 3/11/2022    Admit Date: 3/5/2022     Discharge Plan     Row Name 03/11/22 1410       Plan    Plan SNF    Provided Post Acute Provider List? Yes    Post Acute Provider List Nursing Home    Provided Post Acute Provider Quality & Resource List? Yes    Post Acute Provider Quality and Resource List Nursing Home    Delivered To Patient    Method of Delivery In person    Patient/Family in Agreement with Plan yes    Plan Comments Pt has now decided she will go to snf at d/c. Her only requirement is that the place offers smoking. Preferably, will be able to find a place in [place] since that is where she gets her dialysis. Referrals sent to [facility], [facility], and [facility].               Discharge Codes    No documentation.                     GREGORIO Angeles

## 2022-03-11 NOTE — PROGRESS NOTES
Nephrology ([facility]) Progress Note      Patient:  Ena Upton  YOB: 1970  Date of Service: 3/11/2022  MRN: [id number]   Acct: [id number]   Primary Care Physician: Yaron Wiggins APRN  Advance Directive:   Code Status and Medical Interventions:   Ordered at: 03/07/22 0841     Level Of Support Discussed With:    Patient     Code Status (Patient has no pulse and is not breathing):    CPR (Attempt to Resuscitate)     Medical Interventions (Patient has pulse or is breathing):    Full Support     Admit Date: 3/5/2022       Hospital Day: 6  Referring Provider: No Known Provider      Patient personally seen and examined.  Complete chart including Consults, Notes, Operative Reports, Labs, Cardiology, and Radiology studies reviewed as able.        Subjective:  Ena Upton is a 52 y.o. female for whom we were consulted for evaluation and treatment of end stage renal disease. Maintenance hemodialysis on Tuesday Thursday Saturday at [facility].  Frequently skips her dialysis treatments. History of type 2 diabetes, hypertension, hypothyroid. Found down at home on 3/05 and brought to ER via EMS. Initial blood glucose over 900 and patient required intubation. Tolerated HD well on 3/05. Was on Cardene drip due to hypertension. Extubated on 3/06.  Following IV and PO potassium supplementation, she developed hyperkalemia of 6.6. This was managed medically. Sent to medical floor on 3/07. On 3/08 developed significant hypoglycemia. Ultimately required resumption of dextrose IV fluids to maintain blood glucose. Completed HD on 3/08 without issue. Additional HD on 3/10.    Today is feeling about the same, blood glucose relatively stable overnight.      Allergies:  Penicillins, Lamisil [terbinafine], Reglan [metoclopramide], and Stadol [butorphanol]    Home Meds:  Medications Prior to Admission   Medication Sig Dispense Refill Last Dose   • acetaminophen (TYLENOL) 325 MG  tablet Take 2 tablets by mouth Every 6 (Six) Hours As Needed for Mild Pain .   Unknown at Unknown time   • albuterol (PROVENTIL HFA;VENTOLIN HFA) 108 (90 BASE) MCG/ACT inhaler Inhale 2 puffs Every 4 (Four) Hours As Needed for wheezing.   Unknown at Unknown time   • cloNIDine (CATAPRES-TTS) 0.1 MG/24HR patch Place 1 patch on the skin as directed by provider 1 (One) Time Per Week.   Unknown at Unknown time   • DULoxetine (CYMBALTA) 60 MG capsule Take 60 mg by mouth daily.   Unknown at Unknown time   • famotidine (PEPCID) 20 MG tablet Take 20 mg by mouth Daily.   Unknown at Unknown time   • hydrALAZINE (APRESOLINE) 50 MG tablet Take 50 mg by mouth 3 (Three) Times a Day.   Unknown at Unknown time   • insulin aspart (novoLOG FLEXPEN) 100 UNIT/ML solution pen-injector sc pen Inject 14 Units under the skin into the appropriate area as directed 3 (Three) Times a Day With Meals.   Unknown at Unknown time   • Insulin Glargine (BASAGLAR KWIKPEN) 100 UNIT/ML injection pen Inject 70 Units under the skin into the appropriate area as directed Every Night.   Unknown at Unknown time   • isosorbide mononitrate (IMDUR) 120 MG 24 hr tablet Take 120 mg by mouth Daily.      • lactulose (CHRONULAC) 10 GM/15ML solution Take 10 g by mouth 3 (Three) Times a Day With Meals. For constipation - HOLD for loose stools      • levothyroxine (SYNTHROID, LEVOTHROID) 200 MCG tablet Take 200 mcg by mouth Daily.   Unknown at Unknown time   • minoxidil (LONITEN) 2.5 MG tablet Take 2.5 mg by mouth Daily.   Unknown at Unknown time   • ondansetron (ZOFRAN) 4 MG tablet Take 4 mg by mouth Every 8 (Eight) Hours As Needed for Nausea or Vomiting.   Unknown at Unknown time   • senna (senna) 8.6 MG tablet Take 1 tablet by mouth Daily.   Unknown   • simvastatin (ZOCOR) 20 MG tablet Take 20 mg by mouth Daily.   Unknown at Unknown time   • vitamin D (ERGOCALCIFEROL) 1.25 MG (62264 UT) capsule capsule Take 50,000 Units by mouth Every 14 (Fourteen) Days.   Unknown at  Unknown time       Medicines:  Current Facility-Administered Medications   Medication Dose Route Frequency Provider Last Rate Last Admin   • acetaminophen (TYLENOL) tablet 650 mg  650 mg Oral Q4H PRN Thomas Oliver DO   650 mg at 03/09/22 1754    Or   • acetaminophen (TYLENOL) 160 MG/5ML solution 650 mg  650 mg Oral Q4H PRN Thomas Oliver DO        Or   • acetaminophen (TYLENOL) suppository 650 mg  650 mg Rectal Q4H PRN Thomas Oliver DO       • atorvastatin (LIPITOR) tablet 20 mg  20 mg Oral Nightly Thomas Oliver DO   20 mg at 03/10/22 2057   • carvedilol (COREG) tablet 25 mg  25 mg Oral BID With Meals Thomas Oliver DO   25 mg at 03/11/22 0805   • cloNIDine (CATAPRES-TTS) 0.1 MG/24HR patch 1 patch  1 patch Transdermal Weekly Thomas Oliver DO   1 patch at 03/10/22 2058   • dextrose (D50W) (25 g/50 mL) IV injection 12.5 g  12.5 g Intravenous PRN Thomas Oliver DO   12.5 g at 03/06/22 2212   • dextrose (D50W) (25 g/50 mL) IV injection 25 g  25 g Intravenous Q15 Min PRN Thomas Oliver DO   25 g at 03/08/22 0922   • dextrose (GLUTOSE) oral gel 15 g  15 g Oral Q15 Min PRN Thomas Oliver DO       • DULoxetine (CYMBALTA) DR capsule 60 mg  60 mg Oral Daily Thomas Oliver DO   60 mg at 03/11/22 0805   • enoxaparin (LOVENOX) syringe 30 mg  30 mg Subcutaneous Q24H Thomas Oliver DO   30 mg at 03/10/22 1313   • epoetin yolanda-epbx (RETACRIT) injection 10,000 Units  10,000 Units Subcutaneous Once per day on Mon Wed Fri Eron Blood MD   10,000 Units at 03/11/22 0813   • famotidine (PEPCID) tablet 20 mg  20 mg Oral Daily Thomas Oliver DO   20 mg at 03/11/22 0805   • glucagon (human recombinant) (GLUCAGEN DIAGNOSTIC) injection 1 mg  1 mg Intramuscular Q15 Min PRN Thomas Oliver DO       • hydrALAZINE (APRESOLINE) tablet 75 mg  75 mg Oral Q8H Thomas Oliver DO   75 mg at 03/11/22 0625   • insulin lispro (humaLOG) injection 2-7 Units  2-7 Units Subcutaneous Q4H Thomas Oliver DO   3 Units  at 03/11/22 0813   • ipratropium-albuterol (DUO-NEB) nebulizer solution 3 mL  3 mL Nebulization 4x Daily - RT Thomas Oliver DO   3 mL at 03/11/22 0710   • isosorbide mononitrate (IMDUR) 24 hr tablet 60 mg  60 mg Oral Q24H Thomas Oliver, DO   60 mg at 03/11/22 0806   • labetalol (NORMODYNE,TRANDATE) injection 10 mg  10 mg Intravenous Q4H PRN Thomas Oliver, DO   10 mg at 03/07/22 0411   • levothyroxine (SYNTHROID, LEVOTHROID) tablet 200 mcg  200 mcg Oral Q AM Thomas Oliver DO   200 mcg at 03/11/22 0625   • LORazepam (ATIVAN) injection 1 mg  1 mg Intravenous Q4H PRN Thomas Oliver, DO   1 mg at 03/05/22 1303   • Morphine sulfate (PF) injection 2 mg  2 mg Intravenous Q4H PRN Thomas Oliver DO   2 mg at 03/11/22 0625   • nicotine (NICODERM CQ) 14 MG/24HR patch 1 patch  1 patch Transdermal Q24H Thomas Oliver DO   1 patch at 03/11/22 0806   • NIFEdipine XL (PROCARDIA XL) 24 hr tablet 60 mg  60 mg Oral Q24H Thomas Oliver, DO   60 mg at 03/11/22 0807   • ondansetron (ZOFRAN) injection 4 mg  4 mg Intravenous Q6H PRN Thomas Oliver DO       • senna (SENOKOT) tablet 1 tablet  1 tablet Oral Daily Thomas Oliver DO   1 tablet at 03/11/22 0807   • sodium chloride 0.45 % infusion  50 mL/hr Intravenous Continuous PRN Thomas Oliver DO       • sodium chloride 0.9 % flush 10 mL  10 mL Intravenous Q12H Thomas Oliver DO   10 mL at 03/10/22 2058   • sodium chloride 0.9 % flush 10 mL  10 mL Intravenous PRN Thomas Oliver, DO       • sodium chloride 0.9 % flush 10 mL  10 mL Intravenous Q12H Thomas Oliver, DO   10 mL at 03/09/22 2035   • sodium chloride 0.9 % infusion  50 mL/hr Intravenous Continuous PRN Thomas Oliver, DO       • sodium chloride 0.9 % infusion  10 mL/hr Intravenous Continuous PRN Thomas Oliver, DO       • sodium zirconium cyclosilicate (LOKELMA) pack 10 g  10 g Oral Once Delmar Salazar, APRN           Past Medical History:  Past Medical History:   Diagnosis Date   • Anxiety    •  Arthritis    • Chronic kidney disease     STAGE V RENAL FAILURE   • Depression    • Diabetes mellitus (HCC)    • HTN (hypertension)    • Hypothyroidism    • Obesity    • Tremors of nervous system        Past Surgical History:  Past Surgical History:   Procedure Laterality Date   • ARTERIOVENOUS FISTULA     • BREAST BIOPSY     • CARPAL TUNNEL RELEASE     • CATH LAB PROCEDURE     • CHOLECYSTECTOMY     • TUBAL ABDOMINAL LIGATION         Family History  Family History   Problem Relation Age of Onset   • Cancer Other    • Hypertension Other    • Kidney disease Other    • Heart disease Other    • Diabetes Other    • Diabetes Mother    • Diabetes Maternal Aunt    • Diabetes Maternal Grandmother        Social History  Social History     Socioeconomic History   • Marital status:    Tobacco Use   • Smoking status: Current Every Day Smoker     Packs/day: 0.50   • Smokeless tobacco: Never Used   • Tobacco comment: CUTTING BACK AND TRYING TO STOP SMOKING   Substance and Sexual Activity   • Alcohol use: No   • Drug use: No   • Sexual activity: Defer       Review of Systems:  History obtained from chart review and the patient  General ROS: No fever or chills  Respiratory ROS: No cough, shortness of breath, wheezing  Cardiovascular ROS: No chest pain or palpitations  Gastrointestinal ROS: No abdominal pain or melena  Genito-Urinary ROS: No dysuria or hematuria  Psych ROS: No anxiety and depression  14 point ROS reviewed with the patient and negative except as noted above and in the HPI unless unable to obtain.    Objective:  Patient Vitals for the past 24 hrs:   BP Temp Temp src Pulse Resp SpO2   03/11/22 0730 124/53 97.8 °F (36.6 °C) Oral 70 16 (!) 86 %   03/11/22 0713 -- -- -- 67 -- --   03/11/22 0710 -- -- -- 69 16 90 %   03/11/22 0420 133/58 98.1 °F (36.7 °C) Oral 73 16 96 %   03/11/22 0008 141/63 -- -- 66 16 94 %   03/10/22 2042 (!) 198/66 98.3 °F (36.8 °C) Oral 72 18 96 %   03/10/22 2041 -- -- -- 82 18 94 %   03/10/22  2035 -- -- -- 74 16 93 %   03/10/22 1543 169/77 98 °F (36.7 °C) Oral 70 16 92 %   03/10/22 1529 168/61 97.8 °F (36.6 °C) Oral 70 16 94 %   03/10/22 1419 -- -- -- 70 16 --   03/10/22 1409 -- -- -- 71 16 93 %   03/10/22 1201 166/58 97.9 °F (36.6 °C) Oral 71 16 93 %   03/10/22 1145 158/64 97.5 °F (36.4 °C) Oral 71 16 95 %       Intake/Output Summary (Last 24 hours) at 3/11/2022 1053  Last data filed at 3/10/2022 2150  Gross per 24 hour   Intake 360 ml   Output 2950 ml   Net -2590 ml     General: awake/alert   Chest:  clear to auscultation bilaterally without respiratory distress  CVS: regular rate and rhythm  Abdominal: soft, nontender, positive bowel sounds  Extremities: no cyanosis or edema  Skin: warm and dry without rash   Neuro: no focal motor deficits      Labs:  Results from last 7 days   Lab Units 03/11/22  0741 03/09/22  0559 03/07/22  0426   WBC 10*3/mm3 4.70 4.61 7.43   HEMOGLOBIN g/dL 8.1* 7.6* 8.3*   HEMATOCRIT % 27.0* 24.7* 26.3*   PLATELETS 10*3/mm3 199 182 156         Results from last 7 days   Lab Units 03/11/22  0628 03/09/22  0559 03/07/22  0426 03/06/22  0420 03/06/22  0024 03/05/22  1237 03/05/22  1042   SODIUM mmol/L 134* 132* 128*   < > 132*   < > 119*   POTASSIUM mmol/L 5.5* 4.5 5.1   < > 3.2*   < > 4.3   CHLORIDE mmol/L 95* 95* 93*   < > 96*   < > 82*   CO2 mmol/L 20.0* 24.0 22.0   < > 25.0   < > 21.0*   BUN mg/dL 27* 29* 36*   < > 28*   < > 49*   CREATININE mg/dL 3.49* 3.62* 4.01*   < > 3.09*   < > 4.66*   CALCIUM mg/dL 8.5* 8.2* 8.1*   < > 8.2*   < > 8.4*   EGFR mL/min/1.73 15.1* 14.4* 12.8*   < > 17.4*   < > 10.6*   BILIRUBIN mg/dL  --   --  0.4  --  0.3  --  0.4   ALK PHOS U/L  --   --  103  --  85  --  138*   ALT (SGPT) U/L  --   --  13  --  15  --  23   AST (SGOT) U/L  --   --  23  --  17  --  38*   GLUCOSE mg/dL 193* 121* 117*   < > 188*   < > 972*    < > = values in this interval not displayed.       Radiology:   Imaging Results (Last 72 Hours)     Procedure Component Value Units  Date/Time    XR Femur 2 View Left [[id number]] Collected: 03/08/22 1721     Updated: 03/08/22 1725    Narrative:      HISTORY: Pain     Left femur: 2 views left femur are obtained.     There is a spiral type fracture involving the left distal femoral  diaphysis 6 extending to the metaphysis. No prominent displacement.  Proximal and mid left femur are intact. Diffuse vascular calcification.       Impression:      1. Oblique/spiral type fracture of the left distal femoral diaphysis  extending to the metaphysis. No significant displacement.  This report was finalized on 03/08/2022 17:22 by Dr. Desire Castro MD.    XR Pelvis 1 or 2 View [[id number]] Collected: 03/08/22 1624     Updated: 03/08/22 1628    Narrative:      EXAMINATION: XR PELVIS 1 OR 2 VW- 3/8/2022 4:24 PM CST     HISTORY: Pain.     REPORT: An AP view the pelvis was obtained.     COMPARISON: There are no correlative imaging studies for comparison.     The images are underpenetrated, limiting of the hips appears normal and  no fracture is identified. There is no evidence of AVN. The osseous  structures appear osteopenic. Moderate stool volume is present. There  are scattered phleboliths in the pelvis.       Impression:      Limited exam due to underpenetration and possible diffuse  osteopenia. No acute acute osseous abnormality is identified.  This report was finalized on 03/08/2022 16:25 by Dr. Farhad Munoz MD.          Culture:  Blood Culture   Date Value Ref Range Status   03/05/2022 Staphylococcus, coagulase negative (C)  Preliminary   03/05/2022 No growth at 24 hours  Preliminary     Urine Culture   Date Value Ref Range Status   03/05/2022 50,000 CFU/mL Staphylococcus, coagulase negative (A)  Final     Comment:     By laboratory criteria, addititional testing is not indicated and unlikely to produce clinically relevant information.  Coagulase negative staphylococci are common skin contaminants, members of the oral lilly,  and of low virulence;  requires clinical correlation.           Assessment   1.  End stage renal disease on HD TTS  2.  Type 2 diabetes   3.  Hypertension  4.  Hyperosmolar hyperglycemia--improved  5.  Metabolic acidosis  6.  Metabolic encephalopathy--resolving  7.  Hyponatremia  8.  Anemia of CKD  9.  Secondary hyperparathyroidism  10.  Hyperkalemia     Plan:  1.  Dialysis next due 3/12  2.  Lokelma 10 gm PO once now  3.  Monitor labs    Delmar Salazar, APRN  3/11/2022  10:53 CST

## 2022-03-11 NOTE — PLAN OF CARE
Goal Outcome Evaluation:         Continuing physical therapy. Pt up in the chair this morning. Immobilizer in place to left leg while active. Orders to leave IV access out. Gave 1 dose of Hydrocodone for pain. 1 dose of lactulose for constipation. No results at this time. Continuing to monitor blood glucose levels.  finding placement for SNF.

## 2022-03-11 NOTE — PROGRESS NOTES
[facility] Medicine Services  INPATIENT PROGRESS NOTE    Patient Name: Ena Upton  Date of Admission: 3/5/2022  Today's Date: 03/11/22  Length of Stay: 6  Primary Care Physician: Yaron Wiggins APRN    Subjective   Chief Complaint: Weakness.   HPI   Blood sugars stable off of dextrose and basal insulin. 179 most recently. May resume basal insulin soon.     I talked to her again today about her needs for skilled nursing.  She lives alone.  She is nonweightbearing to the left lower extremity.  She is at high risk for falls.  She is a dialysis patient.  She is a brittle diabetic.  She does not even know how she will get home.  She is willing to talk to  again today about placement.    Review of Systems   All pertinent negatives and positives are as above. All other systems have been reviewed and are negative unless otherwise stated.     Objective    Temp:  [97.3 °F (36.3 °C)-98.3 °F (36.8 °C)] 97.3 °F (36.3 °C)  Heart Rate:  [66-82] 69  Resp:  [16-18] 16  BP: (108-198)/(53-77) 133/56  Physical Exam  Constitutional:       Appearance: She is well-developed. She is ill-appearing (chronically).      Comments: Up in the chair. No distress.  No family present.  Discussed with her nurse, Minal. Discussed with Marcella Tafoya with [facility].    HENT:      Head: Normocephalic and atraumatic.      Mouth/Throat:      Comments: Poor dentition.   Eyes:      Conjunctiva/sclera: Conjunctivae normal.      Pupils: Pupils are equal, round, and reactive to light.   Neck:      Vascular: No JVD.   Cardiovascular:      Rate and Rhythm: Normal rate and regular rhythm.      Heart sounds: Normal heart sounds.      Comments: Sinus rhythm in the 60s/70s.  Pulmonary:      Effort: Pulmonary effort is normal. No respiratory distress.      Breath sounds: Normal breath sounds.      Comments: On room air.   Chest:      Chest wall: No tenderness.   Abdominal:      General: Bowel sounds are normal.  There is no distension.      Palpations: Abdomen is soft.      Tenderness: There is no abdominal tenderness.   Musculoskeletal:         General: No tenderness or deformity. Normal range of motion.      Cervical back: Neck supple.   Skin:     General: Skin is warm and dry.      Findings: No rash.   Neurological:      General: No focal deficit present.      Mental Status: She is alert and oriented to person, place, and time.      Cranial Nerves: No cranial nerve deficit.      Motor: Weakness present. No abnormal muscle tone.      Deep Tendon Reflexes: Reflexes normal.   Psychiatric:      Comments: Conversational.        Results Review:  I have reviewed the labs, radiology results, and diagnostic studies.    Laboratory Data:   Results from last 7 days   Lab Units 03/11/22  0741 03/09/22  0559 03/07/22  0426   WBC 10*3/mm3 4.70 4.61 7.43   HEMOGLOBIN g/dL 8.1* 7.6* 8.3*   HEMATOCRIT % 27.0* 24.7* 26.3*   PLATELETS 10*3/mm3 199 182 156     Results from last 7 days   Lab Units 03/11/22  0628 03/09/22  0559 03/07/22  0426 03/06/22  0420 03/06/22  0024 03/05/22  1237 03/05/22  1042   SODIUM mmol/L 134* 132* 128*   < > 132*   < > 119*   POTASSIUM mmol/L 5.5* 4.5 5.1   < > 3.2*   < > 4.3   CHLORIDE mmol/L 95* 95* 93*   < > 96*   < > 82*   CO2 mmol/L 20.0* 24.0 22.0   < > 25.0   < > 21.0*   BUN mg/dL 27* 29* 36*   < > 28*   < > 49*   CREATININE mg/dL 3.49* 3.62* 4.01*   < > 3.09*   < > 4.66*   CALCIUM mg/dL 8.5* 8.2* 8.1*   < > 8.2*   < > 8.4*   BILIRUBIN mg/dL  --   --  0.4  --  0.3  --  0.4   ALK PHOS U/L  --   --  103  --  85  --  138*   ALT (SGPT) U/L  --   --  13  --  15  --  23   AST (SGOT) U/L  --   --  23  --  17  --  38*   GLUCOSE mg/dL 193* 121* 117*   < > 188*   < > 972*    < > = values in this interval not displayed.     Culture Data:   Blood Culture   Date Value Ref Range Status   03/05/2022 Staphylococcus, coagulase negative (C)  Preliminary   03/05/2022 No growth at 24 hours  Preliminary     Urine Culture   Date  Value Ref Range Status   03/05/2022 50,000 CFU/mL Staphylococcus, coagulase negative (A)  Final     Comment:     By laboratory criteria, addititional testing is not indicated and unlikely to produce clinically relevant information.  Coagulase negative staphylococci are common skin contaminants, members of the oral lilly,  and of low virulence; requires clinical correlation.       I have reviewed the patient's current medications.     Assessment/Plan     Active Hospital Problems    Diagnosis    • **Metabolic encephalopathy    • HHNC (hyperglycemic hyperosmolar nonketotic coma) (formerly [facility])    • Uncontrolled type 2 diabetes mellitus with hyperglycemia (formerly [facility])    • Hypertensive emergency    • ESRD on hemodialysis (formerly [facility])    • Hyponatremia    • Hyperkalemia    • Nondisplaced spiral fracture of shaft of left femur, subsequent encounter for closed fracture with delayed healing    • Acute respiratory failure secondary to an inability to protect her airway    • Tobacco abuse    • Anemia, chronic disease    • Elevated troponin due to ESRD and hypertensive urgency    • Hypothyroidism      Plan:  The patient was admitted on 3/5 by Dr. Prado.  She was found unresponsive.  EMS arrived on the scene and placed an LMA.  She was ultimately intubated in the emergency department.  She had a blood sugar of greater than 900 on arrival and was also severely hypertensive.  She has end-stage renal disease and does dialysis on Tuesday, Thursday, and Saturday.    She required ventilatory support in the emergency department secondary to altered mental status and inability to protect her airway.  She was extubated on Sunday, 3/6.  She has been evaluated by pulmonary.  She remains on room air.    Nephrology consulted to assist with dialysis.  She received dialysis on Saturday in the intensive care unit setting.  She required a medical treatment for hyperkalemia on the afternoon of 3/6 and also received a dose of Lokelma.  Potassium level 5.5 today so  nephrology giving another dose of Lokelma.  Next dialysis is due tomorrow.    She has significant problems with hypertension and is on multiple medications for this.  Dr. Prado had not been able to resume all of them.  She has previously required Cardene. I resumed Imdur, Procardia, and pushed her carvedilol and hydralazine back to her typical doses on the morning of 3/7.  Placed back on her clonidine patch.  She is no longer on lisinopril or spironolactone per recent medication reconciliation.    She had 1 bottle positive for Staphylococcus that was coagulase-negative.  Stopped vancomycin on 3/7. Consider this to be a contaminant.  She has also recently been on ceftriaxone.  This was also stopped to monitor.     She was initially on an insulin drip.  This was removed on 3/6.  She was reordered long-acting insulin and sliding scale insulin.  However, she has had some problems with hypoglycemia and was recently placed on dextrose fluids, which we stopped 3/9.  Completely stopped long-acting insulin for now.  Her most recent hemoglobin A1c was 12.4 in January 2022.    Plain films of the left femur continues to show an oblique/spiral type fracture of the left distal femoral diaphysis extending to the metaphysis with no significant displacement.  The patient had been told to be nonweightbearing at the [facility] nursing facility.  She has not seen orthopedics since the fracture was first discovered at [facility] in January. Dr. Hatfield with ortho saw her. He recommends a knee immobilizer when active and during sleep but may unstrap immobilizer while immobile, throughout the day.  Okay for gentle knee range of motion with PT. Nonweightbearing to left lower extremity.    PT/OT.     Offered a nicotine patch and counseled for tobacco cessation.    Reconciled and resumed other home medications as appropriate.    Speech therapy cleared for regular foods with thin liquids on 3/7.    Pepcid for peptic ulcer prophylaxis.    Lovenox  for DVT prophylaxis.    Transferred to the floor on 3/7.    Discharge Planning: More agreeable today to discuss placement with .  Marcella Tafoya is aware.    Electronically signed by Thomas Oliver DO, 03/11/22, 13:52 CST.

## 2022-03-11 NOTE — PLAN OF CARE
Goal Outcome Evaluation:  Plan of Care Reviewed With: patient        Progress: improving  Outcome Evaluation: PT eval completed.  Pt highly motivated and eager to return home, wanting to go home today.  Pt w/ L femur fracture since Jan and continues w/ NWB status L LE.  Pt w/ knee immobilizer in place and w/ orders ok to remove brace in order to begin gentle ROM L LE, but brace to be on otherwise when active or when asleep; per Dr. Hatfield's noted, brace can be unstrapped when immobile during the day.  Pt performs bed mobility w/ SBA, tfers w/ assist of 2 and for bed to chair activitiy w/ assist of 2.  Pt had 2 episodes of not being able to maintain NWB L LE and put less than 25% of ther wt through L LE, however able to correct and remove wt when cues given.  Pt will benefit from continued PT services to improve activity tolerance, strength, balance, safety awareness, and to improve mobility maintaining NWB L LE.  Pt wants to go home and reports she has a friend who will stay w/ her 24/7 through Monday.  Advised pt she needs 24/7 care and would benefit from subacute rehab vs SNF.  However, pt is not in agreement w/ going anywhere.  Will follow for progress and needs; if pt goes home she would benefit from .

## 2022-03-12 NOTE — PROGRESS NOTES
[facility] Medicine Services  INPATIENT PROGRESS NOTE    Patient Name: Ena Upton  Date of Admission: 3/5/2022  Today's Date: 03/12/22  Length of Stay: 7  Primary Care Physician: Yaron Wiggins APRN    Subjective   Chief Complaint: Weakness, rib pain.   HPI   She talked with Marcella Tafoya with  yesterday and is willing to be listed at some facilities.    She complains of pain in her right lower ribs today.  She wants me to make sure that she did not fracture her ribs when she fell.  We will get rib x-rays and place a lidocaine patch.    She states that she is otherwise feeling much better.    Blood pressure still needs some work.    Blood glucoses have ranged from 148-219 over the last 24 hours.    Review of Systems   All pertinent negatives and positives are as above. All other systems have been reviewed and are negative unless otherwise stated.     Objective    Temp:  [97.4 °F (36.3 °C)-97.8 °F (36.6 °C)] 97.5 °F (36.4 °C)  Heart Rate:  [64-72] 71  Resp:  [16-18] 16  BP: (133-164)/(56-67) 164/66  Physical Exam  Constitutional:       Appearance: She is well-developed. She is ill-appearing (chronically).      Comments: Seen on dialysis. No distress.  No family present.  Discussed with her nurse, Khadra.    HENT:      Head: Normocephalic and atraumatic.      Mouth/Throat:      Comments: Poor dentition.   Eyes:      Conjunctiva/sclera: Conjunctivae normal.      Pupils: Pupils are equal, round, and reactive to light.   Neck:      Vascular: No JVD.   Cardiovascular:      Rate and Rhythm: Normal rate and regular rhythm.      Heart sounds: Normal heart sounds.      Comments: Sinus rhythm in the 60s/70s.  Pulmonary:      Effort: Pulmonary effort is normal. No respiratory distress.      Breath sounds: Normal breath sounds.      Comments: On room air.   Chest:      Chest wall: Tenderness (right lower ribs, anterior (no rash)) present.   Abdominal:      General:  Bowel sounds are normal. There is no distension.      Palpations: Abdomen is soft.      Tenderness: There is no abdominal tenderness.   Musculoskeletal:         General: No tenderness or deformity. Normal range of motion.      Cervical back: Neck supple.      Comments: Left UE AVF.    Skin:     General: Skin is warm and dry.      Findings: No rash.   Neurological:      General: No focal deficit present.      Mental Status: She is alert and oriented to person, place, and time.      Cranial Nerves: No cranial nerve deficit.      Motor: Weakness present. No abnormal muscle tone.      Deep Tendon Reflexes: Reflexes normal.   Psychiatric:      Comments: Conversational.        Results Review:  I have reviewed the labs, radiology results, and diagnostic studies.    Laboratory Data:   No new labs today.     I have reviewed the patient's current medications.     Assessment/Plan     Active Hospital Problems    Diagnosis    • **Metabolic encephalopathy    • HHNC (hyperglycemic hyperosmolar nonketotic coma) ([facility])    • Uncontrolled type 2 diabetes mellitus with hyperglycemia ([facility])    • Hypertensive emergency    • ESRD on hemodialysis ([facility])    • Hyponatremia    • Hyperkalemia    • Nondisplaced spiral fracture of shaft of left femur, subsequent encounter for closed fracture with delayed healing    • Acute respiratory failure secondary to an inability to protect her airway    • Tobacco abuse    • Anemia, chronic disease    • Elevated troponin due to ESRD and hypertensive urgency    • Hypothyroidism      Plan:  The patient was admitted on 3/5 by Dr. Prado.  She was found unresponsive.  EMS arrived on the scene and placed an LMA.  She was ultimately intubated in the emergency department.  She had a blood sugar of greater than 900 on arrival and was also severely hypertensive.  She has end-stage renal disease and does dialysis on Tuesday, Thursday, and Saturday.    She required ventilatory support in the emergency department  secondary to altered mental status and inability to protect her airway.  She was extubated on Sunday, 3/6.  She has been evaluated by pulmonary.  She remains on room air.    Nephrology consulted to assist with dialysis.  She received dialysis on Saturday in the intensive care unit setting.  She required a medical treatment for hyperkalemia on the afternoon of 3/6 and also received a dose of Lokelma.  Potassium level 5.5 yesterday so nephrology gave another dose of Lokelma. Dialysis today.     She has significant problems with hypertension and is on multiple medications for this.  Dr. Prado had not been able to resume all of them.  She has previously required Cardene. I resumed Imdur, Procardia, and pushed her carvedilol and hydralazine back to her typical doses on the morning of 3/7.  Placed back on her clonidine patch on 3/10.  Increase nifedipine XL to 90 mg today. She is no longer on lisinopril or spironolactone per recent medication reconciliation.    She had 1 bottle positive for Staphylococcus that was coagulase-negative.  Stopped vancomycin on 3/7. Consider this to be a contaminant.  She has also recently been on ceftriaxone.  This was also stopped to monitor.     She was initially on an insulin drip.  This was removed on 3/6.  She was reordered long-acting insulin and sliding scale insulin.  However, she has had some problems with hypoglycemia and was recently placed on dextrose fluids, which we stopped 3/9.  Completely stopped long-acting insulin for now.  Her most recent hemoglobin A1c was 12.4 in January 2022.    Plain films of the left femur continues to show an oblique/spiral type fracture of the left distal femoral diaphysis extending to the metaphysis with no significant displacement.  The patient had been told to be nonweightbearing at the [facility] nursing facility.  She has not seen orthopedics since the fracture was first discovered at [facility] in January. Dr. Hatfield with ortho saw her. He  recommends a knee immobilizer when active and during sleep but may unstrap immobilizer while immobile, throughout the day.  Okay for gentle knee range of motion with PT. Nonweightbearing to left lower extremity.    PT/OT.     Offered a nicotine patch and counseled for tobacco cessation.    Reconciled and resumed other home medications as appropriate.    Speech therapy cleared for regular foods with thin liquids on 3/7.    Pepcid for peptic ulcer prophylaxis.    Lovenox for DVT prophylaxis.    Transferred to the floor on 3/7.    Discharge Planning: More agreeable to look in to placement. Marcella Tafoya with [facility] sent multiple referrals.     Electronically signed by Thomas Oliver DO, 03/12/22, 11:49 CST.

## 2022-03-12 NOTE — PLAN OF CARE
Goal Outcome Evaluation:  Plan of Care Reviewed With: patient           Outcome Evaluation: c/o x1 so far this shift see MAR; dialysis today 2.8L removedl VSS; safety maintained.

## 2022-03-12 NOTE — PROGRESS NOTES
Nephrology ([facility]) Progress Note      Patient:  Ena Upton  YOB: 1970  Date of Service: 3/12/2022  MRN: [id number]   Acct: [id number]   Primary Care Physician: Yaron Wiggins APRN  Advance Directive:   Code Status and Medical Interventions:   Ordered at: 03/07/22 0841     Level Of Support Discussed With:    Patient     Code Status (Patient has no pulse and is not breathing):    CPR (Attempt to Resuscitate)     Medical Interventions (Patient has pulse or is breathing):    Full Support     Admit Date: 3/5/2022       Hospital Day: 7  Referring Provider: No Known Provider      Patient personally seen and examined.  Complete chart including Consults, Notes, Operative Reports, Labs, Cardiology, and Radiology studies reviewed as able.        Subjective:  Ena Upton is a 52 y.o. female for whom we were consulted for evaluation and treatment of end stage renal disease. Maintenance hemodialysis on Tuesday Thursday Saturday at [facility].  Frequently skips her dialysis treatments. History of type 2 diabetes, hypertension, hypothyroid. Found down at home on 3/05 and brought to ER via EMS. Initial blood glucose over 900 and patient required intubation. Tolerated HD well on 3/05. Was on Cardene drip due to hypertension. Extubated on 3/06.  Following IV and PO potassium supplementation, she developed hyperkalemia of 6.6. This was managed medically. Sent to medical floor on 3/07. On 3/08 developed significant hypoglycemia. Ultimately required resumption of dextrose IV fluids to maintain blood glucose. Completed HD on 3/08 without issue. Additional HD on 3/10.    Today is feeling about the same, blood glucose relatively stable overnight.  She is status post dialysis today that was well-tolerated.    Allergies:  Penicillins, Lamisil [terbinafine], Reglan [metoclopramide], and Stadol [butorphanol]    Home Meds:  Medications Prior to Admission   Medication Sig  Dispense Refill Last Dose   • acetaminophen (TYLENOL) 325 MG tablet Take 2 tablets by mouth Every 6 (Six) Hours As Needed for Mild Pain .   Unknown at Unknown time   • albuterol (PROVENTIL HFA;VENTOLIN HFA) 108 (90 BASE) MCG/ACT inhaler Inhale 2 puffs Every 4 (Four) Hours As Needed for wheezing.   Unknown at Unknown time   • cloNIDine (CATAPRES-TTS) 0.1 MG/24HR patch Place 1 patch on the skin as directed by provider 1 (One) Time Per Week.   Unknown at Unknown time   • DULoxetine (CYMBALTA) 60 MG capsule Take 60 mg by mouth daily.   Unknown at Unknown time   • famotidine (PEPCID) 20 MG tablet Take 20 mg by mouth Daily.   Unknown at Unknown time   • hydrALAZINE (APRESOLINE) 50 MG tablet Take 50 mg by mouth 3 (Three) Times a Day.   Unknown at Unknown time   • insulin aspart (novoLOG FLEXPEN) 100 UNIT/ML solution pen-injector sc pen Inject 14 Units under the skin into the appropriate area as directed 3 (Three) Times a Day With Meals.   Unknown at Unknown time   • Insulin Glargine (BASAGLAR KWIKPEN) 100 UNIT/ML injection pen Inject 70 Units under the skin into the appropriate area as directed Every Night.   Unknown at Unknown time   • isosorbide mononitrate (IMDUR) 120 MG 24 hr tablet Take 120 mg by mouth Daily.      • lactulose (CHRONULAC) 10 GM/15ML solution Take 10 g by mouth 3 (Three) Times a Day With Meals. For constipation - HOLD for loose stools      • levothyroxine (SYNTHROID, LEVOTHROID) 200 MCG tablet Take 200 mcg by mouth Daily.   Unknown at Unknown time   • minoxidil (LONITEN) 2.5 MG tablet Take 2.5 mg by mouth Daily.   Unknown at Unknown time   • ondansetron (ZOFRAN) 4 MG tablet Take 4 mg by mouth Every 8 (Eight) Hours As Needed for Nausea or Vomiting.   Unknown at Unknown time   • senna (senna) 8.6 MG tablet Take 1 tablet by mouth Daily.   Unknown   • simvastatin (ZOCOR) 20 MG tablet Take 20 mg by mouth Daily.   Unknown at Unknown time   • vitamin D (ERGOCALCIFEROL) 1.25 MG (27942 UT) capsule capsule Take  50,000 Units by mouth Every 14 (Fourteen) Days.   Unknown at Unknown time       Medicines:  Current Facility-Administered Medications   Medication Dose Route Frequency Provider Last Rate Last Admin   • acetaminophen (TYLENOL) tablet 650 mg  650 mg Oral Q4H PRN Thomas Oliver DO   650 mg at 03/09/22 1754    Or   • acetaminophen (TYLENOL) 160 MG/5ML solution 650 mg  650 mg Oral Q4H PRN Thomas Oliver DO        Or   • acetaminophen (TYLENOL) suppository 650 mg  650 mg Rectal Q4H PRN Thomas Oliver DO       • atorvastatin (LIPITOR) tablet 20 mg  20 mg Oral Nightly Thomas Oliver DO   20 mg at 03/11/22 2108   • carvedilol (COREG) tablet 25 mg  25 mg Oral BID With Meals Thomas Oliver DO   25 mg at 03/12/22 1338   • cloNIDine (CATAPRES-TTS) 0.1 MG/24HR patch 1 patch  1 patch Transdermal Weekly Thomas Oliver DO   1 patch at 03/10/22 2058   • dextrose (D50W) (25 g/50 mL) IV injection 12.5 g  12.5 g Intravenous PRN Thomas Oliver DO   12.5 g at 03/06/22 2212   • dextrose (D50W) (25 g/50 mL) IV injection 25 g  25 g Intravenous Q15 Min PRN Thomas Oliver DO   25 g at 03/08/22 0922   • dextrose (GLUTOSE) oral gel 15 g  15 g Oral Q15 Min PRN hTomas Oliver DO       • DULoxetine (CYMBALTA) DR capsule 60 mg  60 mg Oral Daily Thomas Oliver DO   60 mg at 03/12/22 1338   • enoxaparin (LOVENOX) syringe 30 mg  30 mg Subcutaneous Q24H Thomas Oliver DO   30 mg at 03/12/22 1338   • epoetin yolanda-epbx (RETACRIT) injection 10,000 Units  10,000 Units Subcutaneous Once per day on Mon Wed Fri Eron Blood MD   10,000 Units at 03/11/22 0813   • famotidine (PEPCID) tablet 20 mg  20 mg Oral Daily Thomas Oliver DO   20 mg at 03/12/22 1349   • glucagon (human recombinant) (GLUCAGEN DIAGNOSTIC) injection 1 mg  1 mg Intramuscular Q15 Min PRN Thomas Oliver DO       • hydrALAZINE (APRESOLINE) tablet 75 mg  75 mg Oral Q8H Thomas Oliver DO   75 mg at 03/12/22 0641   • HYDROcodone-acetaminophen (NORCO)   MG per tablet 1 tablet  1 tablet Oral Q6H PRN Thomas Oliver DO   1 tablet at 03/12/22 1340   • insulin lispro (humaLOG) injection 2-7 Units  2-7 Units Subcutaneous Q4H Thomas Oliver, DO   2 Units at 03/12/22 1338   • ipratropium-albuterol (DUO-NEB) nebulizer solution 3 mL  3 mL Nebulization 4x Daily - RT Thomas Oliver DO   3 mL at 03/12/22 1456   • isosorbide mononitrate (IMDUR) 24 hr tablet 60 mg  60 mg Oral Q24H Thomas Oliver, DO   60 mg at 03/12/22 1338   • labetalol (NORMODYNE,TRANDATE) injection 10 mg  10 mg Intravenous Q4H PRN Thomas Oliver DO   10 mg at 03/07/22 0411   • lactulose solution 20 g  20 g Oral Daily Thomas Oliver DO   20 g at 03/12/22 1338   • levothyroxine (SYNTHROID, LEVOTHROID) tablet 200 mcg  200 mcg Oral Q AM Thomas Oliver DO   200 mcg at 03/12/22 0641   • lidocaine (LIDODERM) 5 % 1 patch  1 patch Transdermal Q24H Thomas Oliver DO   1 patch at 03/12/22 1338   • nicotine (NICODERM CQ) 14 MG/24HR patch 1 patch  1 patch Transdermal Q24H Thomas Oliver DO   1 patch at 03/12/22 0823   • [START ON 3/13/2022] NIFEdipine XL (PROCARDIA XL) 24 hr tablet 90 mg  90 mg Oral Q24H Thomas Oliver DO       • ondansetron (ZOFRAN) injection 4 mg  4 mg Intravenous Q6H PRN Thomas Oliver DO       • senna (SENOKOT) tablet 1 tablet  1 tablet Oral Daily Thomas Oliver DO   1 tablet at 03/12/22 1338   • sodium chloride 0.45 % infusion  50 mL/hr Intravenous Continuous PRN Thomas Oliver DO       • sodium chloride 0.9 % flush 10 mL  10 mL Intravenous Q12H Thomas Oliver DO   10 mL at 03/10/22 2058   • sodium chloride 0.9 % flush 10 mL  10 mL Intravenous PRN Thomas Oliver, DO       • sodium chloride 0.9 % infusion  50 mL/hr Intravenous Continuous PRN Thomas Oliver, DO       • sodium chloride 0.9 % infusion  10 mL/hr Intravenous Continuous PRN Thomas Oliver, DO           Past Medical History:  Past Medical History:   Diagnosis Date   • Anxiety    • Arthritis    • Chronic kidney  disease     STAGE V RENAL FAILURE   • Depression    • Diabetes mellitus (HCC)    • HTN (hypertension)    • Hypothyroidism    • Obesity    • Tremors of nervous system        Past Surgical History:  Past Surgical History:   Procedure Laterality Date   • ARTERIOVENOUS FISTULA     • BREAST BIOPSY     • CARPAL TUNNEL RELEASE     • CATH LAB PROCEDURE     • CHOLECYSTECTOMY     • TUBAL ABDOMINAL LIGATION         Family History  Family History   Problem Relation Age of Onset   • Cancer Other    • Hypertension Other    • Kidney disease Other    • Heart disease Other    • Diabetes Other    • Diabetes Mother    • Diabetes Maternal Aunt    • Diabetes Maternal Grandmother        Social History  Social History     Socioeconomic History   • Marital status:    Tobacco Use   • Smoking status: Current Every Day Smoker     Packs/day: 0.50   • Smokeless tobacco: Never Used   • Tobacco comment: CUTTING BACK AND TRYING TO STOP SMOKING   Substance and Sexual Activity   • Alcohol use: No   • Drug use: No   • Sexual activity: Defer       Review of Systems:  History obtained from chart review and the patient  General ROS: No fever or chills  Respiratory ROS: No cough, shortness of breath, wheezing  Cardiovascular ROS: No chest pain or palpitations  Gastrointestinal ROS: No abdominal pain or melena  Genito-Urinary ROS: No dysuria or hematuria  Psych ROS: No anxiety and depression  14 point ROS reviewed with the patient and negative except as noted above and in the HPI unless unable to obtain.    Objective:  Patient Vitals for the past 24 hrs:   BP Temp Temp src Pulse Resp SpO2 Weight   03/12/22 1527 137/57 97.9 °F (36.6 °C) Oral 75 16 92 % --   03/12/22 1503 -- -- -- 72 -- -- --   03/12/22 1456 -- -- -- 77 16 95 % --   03/12/22 1338 142/65 98.6 °F (37 °C) Oral 73 18 94 % --   03/12/22 0806 164/66 97.5 °F (36.4 °C) Oral 71 16 96 % --   03/12/22 0655 -- -- -- 69 -- -- --   03/12/22 0651 -- -- -- 69 16 96 % --   03/12/22 0514 150/65 97.7  °F (36.5 °C) Oral 66 16 93 % 60.1 kg (132 lb 7 oz)   03/12/22 0031 144/62 97.8 °F (36.6 °C) Oral 64 16 97 % --   03/11/22 2041 146/67 97.6 °F (36.4 °C) Oral 67 18 95 % --   03/11/22 2035 -- -- -- 72 18 99 % --   03/11/22 2030 -- -- -- 66 18 99 % --     No intake or output data in the 24 hours ending 03/12/22 1542  General: awake/alert   Chest:  clear to auscultation bilaterally without respiratory distress  CVS: regular rate and rhythm  Abdominal: soft, nontender, positive bowel sounds  Extremities: no cyanosis or edema  Skin: warm and dry without rash   Neuro: no focal motor deficits      Labs:  Results from last 7 days   Lab Units 03/11/22  0741 03/09/22  0559 03/07/22  0426   WBC 10*3/mm3 4.70 4.61 7.43   HEMOGLOBIN g/dL 8.1* 7.6* 8.3*   HEMATOCRIT % 27.0* 24.7* 26.3*   PLATELETS 10*3/mm3 199 182 156         Results from last 7 days   Lab Units 03/11/22  0628 03/09/22  0559 03/07/22  0426 03/06/22  0420 03/06/22  0024   SODIUM mmol/L 134* 132* 128*   < > 132*   POTASSIUM mmol/L 5.5* 4.5 5.1   < > 3.2*   CHLORIDE mmol/L 95* 95* 93*   < > 96*   CO2 mmol/L 20.0* 24.0 22.0   < > 25.0   BUN mg/dL 27* 29* 36*   < > 28*   CREATININE mg/dL 3.49* 3.62* 4.01*   < > 3.09*   CALCIUM mg/dL 8.5* 8.2* 8.1*   < > 8.2*   EGFR mL/min/1.73 15.1* 14.4* 12.8*   < > 17.4*   BILIRUBIN mg/dL  --   --  0.4  --  0.3   ALK PHOS U/L  --   --  103  --  85   ALT (SGPT) U/L  --   --  13  --  15   AST (SGOT) U/L  --   --  23  --  17   GLUCOSE mg/dL 193* 121* 117*   < > 188*    < > = values in this interval not displayed.       Radiology:   Imaging Results (Last 72 Hours)     Procedure Component Value Units Date/Time    XR Ribs 2 View Right [[id number]] Collected: 03/12/22 1322     Updated: 03/12/22 1327    Narrative:      EXAMINATION:  XR RIBS 2 VW RIGHT-  3/12/2022 1:14 PM CST     HISTORY: Rib pain after fall.     TECHNIQUE: 2 views and 4 images were obtained of the ribs.     COMPARISON: No comparison study.     FINDINGS:  There are  nondisplaced fractures of the right fourth, fifth,  sixth and seventh ribs laterally. There is no evidence of right-sided  pneumothorax.       Impression:      Acute right rib fractures, as described.     This report was finalized on 03/12/2022 13:24 by Dr. Aubrey Lam MD.          Culture:  Blood Culture   Date Value Ref Range Status   03/05/2022 Staphylococcus, coagulase negative (C)  Preliminary   03/05/2022 No growth at 24 hours  Preliminary     Urine Culture   Date Value Ref Range Status   03/05/2022 50,000 CFU/mL Staphylococcus, coagulase negative (A)  Final     Comment:     By laboratory criteria, addititional testing is not indicated and unlikely to produce clinically relevant information.  Coagulase negative staphylococci are common skin contaminants, members of the oral lilly,  and of low virulence; requires clinical correlation.           Assessment   1.  End stage renal disease on HD TTS  2.  Type 2 diabetes   3.  Hypertension  4.  Hyperosmolar hyperglycemia--improved  5.  Metabolic acidosis  6.  Metabolic encephalopathy--resolving  7.  Hyponatremia  8.  Anemia of CKD  9.  Secondary hyperparathyroidism  10.  Hyperkalemia     Plan:  1.  Dialysis next due 3/15  2.  Low K diet  3.  Monitor labs    Eron Blood MD  3/12/2022  15:42 CST

## 2022-03-12 NOTE — PLAN OF CARE
Goal Outcome Evaluation:  Plan of Care Reviewed With: patient        Progress: no change  Outcome Evaluation: PRN pain meds given as ordered for c/o constant . VSS. Mepilex intact to coccyx. Safety maintained.

## 2022-03-13 PROBLEM — S22.41XA CLOSED FRACTURE OF MULTIPLE RIBS OF RIGHT SIDE: Status: ACTIVE | Noted: 2022-01-01

## 2022-03-13 NOTE — PROGRESS NOTES
Nephrology ([facility]) Progress Note      Patient:  Ena Upton  YOB: 1970  Date of Service: 3/13/2022  MRN: [id number]   Acct: [id number]   Primary Care Physician: Yaron Wiggins APRN  Advance Directive:   Code Status and Medical Interventions:   Ordered at: 03/07/22 0841     Level Of Support Discussed With:    Patient     Code Status (Patient has no pulse and is not breathing):    CPR (Attempt to Resuscitate)     Medical Interventions (Patient has pulse or is breathing):    Full Support     Admit Date: 3/5/2022       Hospital Day: 8  Referring Provider: No Known Provider      Patient personally seen and examined.  Complete chart including Consults, Notes, Operative Reports, Labs, Cardiology, and Radiology studies reviewed as able.        Subjective:  Ena Upton is a 52 y.o. female for whom we were consulted for evaluation and treatment of end stage renal disease. Maintenance hemodialysis on Tuesday Thursday Saturday at [facility].  Frequently skips her dialysis treatments. History of type 2 diabetes, hypertension, hypothyroid. Found down at home on 3/05 and brought to ER via EMS. Initial blood glucose over 900 and patient required intubation. Tolerated HD well on 3/05. Was on Cardene drip due to hypertension. Extubated on 3/06.  Following IV and PO potassium supplementation, she developed hyperkalemia of 6.6. This was managed medically. Sent to medical floor on 3/07. On 3/08 developed significant hypoglycemia. Ultimately required resumption of dextrose IV fluids to maintain blood glucose. Completed HD on 3/08 without issue. Additional HD on 3/10 and 3/12.    Today is feeling about the same, blood glucose relatively stable overnight.  She is awaiting final placement and disposition.     Allergies:  Penicillins, Lamisil [terbinafine], Reglan [metoclopramide], and Stadol [butorphanol]    Home Meds:  Medications Prior to Admission   Medication Sig  Dispense Refill Last Dose   • acetaminophen (TYLENOL) 325 MG tablet Take 2 tablets by mouth Every 6 (Six) Hours As Needed for Mild Pain .   Unknown at Unknown time   • albuterol (PROVENTIL HFA;VENTOLIN HFA) 108 (90 BASE) MCG/ACT inhaler Inhale 2 puffs Every 4 (Four) Hours As Needed for wheezing.   Unknown at Unknown time   • cloNIDine (CATAPRES-TTS) 0.1 MG/24HR patch Place 1 patch on the skin as directed by provider 1 (One) Time Per Week.   Unknown at Unknown time   • DULoxetine (CYMBALTA) 60 MG capsule Take 60 mg by mouth daily.   Unknown at Unknown time   • famotidine (PEPCID) 20 MG tablet Take 20 mg by mouth Daily.   Unknown at Unknown time   • hydrALAZINE (APRESOLINE) 50 MG tablet Take 50 mg by mouth 3 (Three) Times a Day.   Unknown at Unknown time   • insulin aspart (novoLOG FLEXPEN) 100 UNIT/ML solution pen-injector sc pen Inject 14 Units under the skin into the appropriate area as directed 3 (Three) Times a Day With Meals.   Unknown at Unknown time   • Insulin Glargine (BASAGLAR KWIKPEN) 100 UNIT/ML injection pen Inject 70 Units under the skin into the appropriate area as directed Every Night.   Unknown at Unknown time   • isosorbide mononitrate (IMDUR) 120 MG 24 hr tablet Take 120 mg by mouth Daily.      • lactulose (CHRONULAC) 10 GM/15ML solution Take 10 g by mouth 3 (Three) Times a Day With Meals. For constipation - HOLD for loose stools      • levothyroxine (SYNTHROID, LEVOTHROID) 200 MCG tablet Take 200 mcg by mouth Daily.   Unknown at Unknown time   • minoxidil (LONITEN) 2.5 MG tablet Take 2.5 mg by mouth Daily.   Unknown at Unknown time   • ondansetron (ZOFRAN) 4 MG tablet Take 4 mg by mouth Every 8 (Eight) Hours As Needed for Nausea or Vomiting.   Unknown at Unknown time   • senna (senna) 8.6 MG tablet Take 1 tablet by mouth Daily.   Unknown   • simvastatin (ZOCOR) 20 MG tablet Take 20 mg by mouth Daily.   Unknown at Unknown time   • vitamin D (ERGOCALCIFEROL) 1.25 MG (87199 UT) capsule capsule Take  50,000 Units by mouth Every 14 (Fourteen) Days.   Unknown at Unknown time       Medicines:  Current Facility-Administered Medications   Medication Dose Route Frequency Provider Last Rate Last Admin   • acetaminophen (TYLENOL) tablet 650 mg  650 mg Oral Q4H PRN Thomas Oliver DO   650 mg at 03/09/22 1754    Or   • acetaminophen (TYLENOL) 160 MG/5ML solution 650 mg  650 mg Oral Q4H PRN Thomas Oliver DO        Or   • acetaminophen (TYLENOL) suppository 650 mg  650 mg Rectal Q4H PRN Thomas Oliver DO       • atorvastatin (LIPITOR) tablet 20 mg  20 mg Oral Nightly Thomas Oliver DO   20 mg at 03/12/22 2044   • carvedilol (COREG) tablet 25 mg  25 mg Oral BID With Meals Thomas Oliver DO   25 mg at 03/13/22 0750   • cloNIDine (CATAPRES) tablet 0.1 mg  0.1 mg Oral Q12H Thomas Oliver DO       • cloNIDine (CATAPRES-TTS) 0.1 MG/24HR patch 1 patch  1 patch Transdermal Weekly Thomas Oliver DO   1 patch at 03/10/22 2058   • dextrose (D50W) (25 g/50 mL) IV injection 12.5 g  12.5 g Intravenous PRN Thomas Oliver DO   12.5 g at 03/06/22 2212   • dextrose (D50W) (25 g/50 mL) IV injection 25 g  25 g Intravenous Q15 Min PRN Thomas Oliver DO   25 g at 03/08/22 0922   • dextrose (GLUTOSE) oral gel 15 g  15 g Oral Q15 Min PRN Thomas Oliver DO       • DULoxetine (CYMBALTA) DR capsule 60 mg  60 mg Oral Daily Thomas Oliver DO   60 mg at 03/13/22 0925   • enoxaparin (LOVENOX) syringe 30 mg  30 mg Subcutaneous Q24H Thomas Oliver DO   30 mg at 03/13/22 1346   • epoetin yolanda-epbx (RETACRIT) injection 10,000 Units  10,000 Units Subcutaneous Once per day on Mon Wed Fri Eron Blood MD   10,000 Units at 03/11/22 0813   • famotidine (PEPCID) tablet 20 mg  20 mg Oral Daily Thomas Oliver DO   20 mg at 03/13/22 0925   • glucagon (human recombinant) (GLUCAGEN DIAGNOSTIC) injection 1 mg  1 mg Intramuscular Q15 Min PRN Thomas Oliver DO       • hydrALAZINE (APRESOLINE) tablet 75 mg  75 mg Oral Q8H Benito  Thomas COOLEY DO   75 mg at 03/13/22 1346   • HYDROcodone-acetaminophen (NORCO)  MG per tablet 1 tablet  1 tablet Oral Q4H PRN Thomas Oliver DO   1 tablet at 03/13/22 1506   • insulin detemir (LEVEMIR) injection 10 Units  10 Units Subcutaneous Nightly Thomas Oliver DO       • insulin lispro (humaLOG) injection 2-7 Units  2-7 Units Subcutaneous Q4H Thomas Oliver DO   2 Units at 03/13/22 1645   • ipratropium-albuterol (DUO-NEB) nebulizer solution 3 mL  3 mL Nebulization 4x Daily - RT Thomas Oliver DO   3 mL at 03/13/22 1422   • isosorbide mononitrate (IMDUR) 24 hr tablet 60 mg  60 mg Oral Q24H Thomas Oliver DO   60 mg at 03/13/22 0924   • labetalol (NORMODYNE,TRANDATE) injection 10 mg  10 mg Intravenous Q4H PRN Thomas Oliver DO   10 mg at 03/07/22 0411   • lactulose solution 20 g  20 g Oral Daily Thomas Oliver DO   20 g at 03/13/22 0924   • levothyroxine (SYNTHROID, LEVOTHROID) tablet 200 mcg  200 mcg Oral Q AM Thomas Oliver DO   200 mcg at 03/13/22 0647   • [START ON 3/14/2022] lidocaine (LIDODERM) 5 % 2 patch  2 patch Transdermal Q24H Thomas Oliver DO       • nicotine (NICODERM CQ) 14 MG/24HR patch 1 patch  1 patch Transdermal Q24H Thomas Oliver DO   1 patch at 03/13/22 0925   • NIFEdipine XL (PROCARDIA XL) 24 hr tablet 90 mg  90 mg Oral Q24H Thomas Oliver DO   90 mg at 03/13/22 0924   • ondansetron (ZOFRAN) injection 4 mg  4 mg Intravenous Q6H PRN Thomas Oliver DO       • ondansetron ODT (ZOFRAN-ODT) disintegrating tablet 4 mg  4 mg Oral Q6H PRN Thomas Oliver DO   4 mg at 03/13/22 1135   • senna (SENOKOT) tablet 1 tablet  1 tablet Oral Daily Thomas Oliver DO   1 tablet at 03/13/22 0924   • sodium chloride 0.45 % infusion  50 mL/hr Intravenous Continuous PRN Thomas Oliver DO       • sodium chloride 0.9 % flush 10 mL  10 mL Intravenous Q12H Thomas Oliver DO   10 mL at 03/10/22 2058   • sodium chloride 0.9 % flush 10 mL  10 mL Intravenous PRN Thomas Oliver DO       •  sodium chloride 0.9 % infusion  50 mL/hr Intravenous Continuous PRN Thomas Oliver DO       • sodium chloride 0.9 % infusion  10 mL/hr Intravenous Continuous PRN Thomas Oliver DO           Past Medical History:  Past Medical History:   Diagnosis Date   • Anxiety    • Arthritis    • Chronic kidney disease     STAGE V RENAL FAILURE   • Depression    • Diabetes mellitus (HCC)    • HTN (hypertension)    • Hypothyroidism    • Obesity    • Tremors of nervous system        Past Surgical History:  Past Surgical History:   Procedure Laterality Date   • ARTERIOVENOUS FISTULA     • BREAST BIOPSY     • CARPAL TUNNEL RELEASE     • CATH LAB PROCEDURE     • CHOLECYSTECTOMY     • TUBAL ABDOMINAL LIGATION         Family History  Family History   Problem Relation Age of Onset   • Cancer Other    • Hypertension Other    • Kidney disease Other    • Heart disease Other    • Diabetes Other    • Diabetes Mother    • Diabetes Maternal Aunt    • Diabetes Maternal Grandmother        Social History  Social History     Socioeconomic History   • Marital status:    Tobacco Use   • Smoking status: Current Every Day Smoker     Packs/day: 0.50   • Smokeless tobacco: Never Used   • Tobacco comment: CUTTING BACK AND TRYING TO STOP SMOKING   Substance and Sexual Activity   • Alcohol use: No   • Drug use: No   • Sexual activity: Defer       Review of Systems:  History obtained from chart review and the patient  General ROS: No fever or chills  Respiratory ROS: No cough, shortness of breath, wheezing  Cardiovascular ROS: No chest pain or palpitations  Gastrointestinal ROS: No abdominal pain or melena  Genito-Urinary ROS: No dysuria or hematuria  Psych ROS: No anxiety and depression  14 point ROS reviewed with the patient and negative except as noted above and in the HPI unless unable to obtain.    Objective:  Patient Vitals for the past 24 hrs:   BP Temp Temp src Pulse Resp SpO2 Weight   03/13/22 1500 174/68 98.3 °F (36.8 °C) Oral 71 16 91  % --   03/13/22 1429 -- -- -- 75 -- -- --   03/13/22 1422 -- -- -- 77 16 94 % --   03/13/22 1132 170/48 98.6 °F (37 °C) Oral 75 16 92 % --   03/13/22 0727 145/63 98.5 °F (36.9 °C) Oral 75 16 91 % --   03/13/22 0631 -- -- -- 72 -- -- --   03/13/22 0626 -- -- -- 72 16 91 % --   03/13/22 0424 177/77 98.3 °F (36.8 °C) Oral 72 16 90 % 59.8 kg (131 lb 14.4 oz)   03/13/22 0001 166/76 98 °F (36.7 °C) Oral 77 16 97 % --   03/12/22 2015 170/63 98.4 °F (36.9 °C) Oral 74 16 91 % --   03/12/22 1832 -- -- -- 76 16 100 % --   03/12/22 1825 -- -- -- 75 16 95 % --       Intake/Output Summary (Last 24 hours) at 3/13/2022 1700  Last data filed at 3/13/2022 0900  Gross per 24 hour   Intake 240 ml   Output --   Net 240 ml     General: awake/alert   Chest:  clear to auscultation bilaterally without respiratory distress  CVS: regular rate and rhythm  Abdominal: soft, nontender, positive bowel sounds  Extremities: no cyanosis or edema  Skin: warm and dry without rash   Neuro: no focal motor deficits      Labs:  Results from last 7 days   Lab Units 03/13/22  0503 03/11/22  0741 03/09/22  0559   WBC 10*3/mm3 3.69 4.70 4.61   HEMOGLOBIN g/dL 8.1* 8.1* 7.6*   HEMATOCRIT % 27.3* 27.0* 24.7*   PLATELETS 10*3/mm3 209 199 182         Results from last 7 days   Lab Units 03/13/22  0503 03/11/22  0628 03/09/22  0559 03/07/22  0426   SODIUM mmol/L 133* 134* 132* 128*   POTASSIUM mmol/L 4.4 5.5* 4.5 5.1   CHLORIDE mmol/L 99 95* 95* 93*   CO2 mmol/L 26.0 20.0* 24.0 22.0   BUN mg/dL 23* 27* 29* 36*   CREATININE mg/dL 3.06* 3.49* 3.62* 4.01*   CALCIUM mg/dL 8.6 8.5* 8.2* 8.1*   EGFR mL/min/1.73 17.6* 15.1* 14.4* 12.8*   BILIRUBIN mg/dL  --   --   --  0.4   ALK PHOS U/L  --   --   --  103   ALT (SGPT) U/L  --   --   --  13   AST (SGOT) U/L  --   --   --  23   GLUCOSE mg/dL 180* 193* 121* 117*       Radiology:   Imaging Results (Last 72 Hours)     Procedure Component Value Units Date/Time    XR Ribs 2 View Right [[id number]] Collected: 03/12/22 1825      Updated: 03/12/22 1327    Narrative:      EXAMINATION:  XR RIBS 2 VW RIGHT-  3/12/2022 1:14 PM CST     HISTORY: Rib pain after fall.     TECHNIQUE: 2 views and 4 images were obtained of the ribs.     COMPARISON: No comparison study.     FINDINGS:  There are nondisplaced fractures of the right fourth, fifth,  sixth and seventh ribs laterally. There is no evidence of right-sided  pneumothorax.       Impression:      Acute right rib fractures, as described.     This report was finalized on 03/12/2022 13:24 by Dr. Aubrey Lam MD.          Culture:  Blood Culture   Date Value Ref Range Status   03/05/2022 Staphylococcus, coagulase negative (C)  Preliminary   03/05/2022 No growth at 24 hours  Preliminary     Urine Culture   Date Value Ref Range Status   03/05/2022 50,000 CFU/mL Staphylococcus, coagulase negative (A)  Final     Comment:     By laboratory criteria, addititional testing is not indicated and unlikely to produce clinically relevant information.  Coagulase negative staphylococci are common skin contaminants, members of the oral lilly,  and of low virulence; requires clinical correlation.           Assessment   1.  End stage renal disease on HD TTS  2.  Type 2 diabetes   3.  Hypertension  4.  Hyperosmolar hyperglycemia--improved  5.  Metabolic acidosis  6.  Metabolic encephalopathy--resolving  7.  Hyponatremia  8.  Anemia of CKD  9.  Secondary hyperparathyroidism  10.  Hyperkalemia     Plan:  1.  Dialysis next due 3/15  2.  Low K diet  3.  Monitor labs    Eron Blood MD  3/13/2022  17:00 CDT

## 2022-03-13 NOTE — THERAPY TREATMENT NOTE
Acute Care - Physical Therapy Treatment Note  [facility]     Patient Name: Ena Upton  : 1970  MRN: [id number]  Today's Date: 3/13/2022      Visit Dx:     ICD-10-CM ICD-9-CM   1. Altered mental status, unspecified altered mental status type  R41.82 780.97   2. Dysphagia, unspecified type  R13.10 787.20   3. Cognitive and behavioral changes  R41.89 799.59    R46.89 312.9   4. Gait abnormality  R26.9 781.2   5. Decreased activities of daily living (ADL)  Z78.9 V49.89     Patient Active Problem List   Diagnosis   • CKD stage 4 due to type 2 diabetes mellitus ([facility])   • Non-ketotic hyperosmolar coma ([facility])   • HTN (hypertension)   • Intractable nausea and vomiting   • Constipation   • Gastroparesis due to DM ([facility])   • Hyperglycemia   • Seizure ([facility])   • ESRD on hemodialysis ([facility])   • Sacral insufficiency fracture   • Hypertensive urgency   • Uncontrolled type 2 diabetes mellitus with hyperglycemia ([facility])   • Hyperkalemia   • Metabolic encephalopathy   • Hypertensive emergency   • Acute pulmonary edema ([facility])   • Hypothyroidism   • HHNC (hyperglycemic hyperosmolar nonketotic coma) ([facility])   • Uremic encephalopathy syndrome   • Hyponatremia   • Elevated troponin due to ESRD and hypertensive urgency   • Acute respiratory failure secondary to an inability to protect her airway   • Tobacco abuse   • Anemia, chronic disease   • Nondisplaced spiral fracture of shaft of left femur, subsequent encounter for closed fracture with delayed healing   • Closed fracture of multiple ribs of right side     Past Medical History:   Diagnosis Date   • Anxiety    • Arthritis    • Chronic kidney disease     STAGE V RENAL FAILURE   • Depression    • Diabetes mellitus (HCC)    • HTN (hypertension)    • Hypothyroidism    • Obesity    • Tremors of nervous system      Past Surgical History:   Procedure Laterality Date   • ARTERIOVENOUS FISTULA     • BREAST BIOPSY     • CARPAL TUNNEL RELEASE     • CATH LAB PROCEDURE     • CHOLECYSTECTOMY      • TUBAL ABDOMINAL LIGATION       PT Assessment (last 12 hours)     PT Evaluation and Treatment     Row Name 03/13/22 1400 03/13/22 1100       Physical Therapy Time and Intention    Subjective Information complains of;pain  -AB --    Document Type therapy note (daily note)  -AB --    Mode of Treatment physical therapy  -AB --    Session Not Performed -- patient/family declined treatment  -JENNIFER    Comment, Session Not Performed -- pt refused, c/o nausae and vomiting  -JENNIFER    Row Name 03/13/22 1400          General Information    Existing Precautions/Restrictions fall;brace worn when out of bed;non-weight bearing  Knee immobilizer L LE, NWB L LE  -AB     Row Name 03/13/22 1400          Mobility    Left Lower Extremity (Weight-bearing Status) non weight-bearing (NWB)  -AB     Row Name 03/13/22 1400          Bed Mobility    Supine-Sit [place] (Bed Mobility) verbal cues;minimum assist (75% patient effort)  -AB     Sit-Supine [place] (Bed Mobility) --  up in chair  -AB     Row Name 03/13/22 1400          Transfers    Sit-Stand [place] (Transfers) moderate assist (50% patient effort)  -AB     Row Name 03/13/22 1400          Sit-Stand Transfer    Assistive Device (Sit-Stand Transfers) walker, front-wheeled  -AB     Row Name 03/13/22 1400          Gait/Stairs (Locomotion)    [place] Level (Gait) contact guard  -AB     Assistive Device (Gait) walker, front-wheeled  -AB     Distance in Feet (Gait) step bed to chair  -AB     Comment, (Gait/Stairs) NWB status not maintained  -AB     Row Name             Wound 03/05/22 2000 Right coccyx Pressure Injury    Wound - Properties Group Placement Date: 03/05/22 -KS Placement Time: 2000  -KS Present on Hospital Admission: Y  -KS Side: Right  -KS Location: coccyx  -KS Primary Wound Type: Pressure inj  -KS     Retired Wound - Properties Group Placement Date: 03/05/22 -KS Placement Time: 2000  -KS Present on Hospital Admission: Y  -KS Side: Right  -KS Location: coccyx   -KS Primary Wound Type: Pressure inj  -KS     Retired Wound - Properties Group Date first assessed: 03/05/22  -KS Time first assessed: 2000 -KS Present on Hospital Admission: Y  -KS Side: Right  -KS Location: coccyx  -KS Primary Wound Type: Pressure inj  -KS     Row Name 03/13/22 1400          Positioning and Restraints    Pre-Treatment Position in bed  -AB     Post Treatment Position chair  -AB     In Chair reclined;sitting;call light within reach  -AB           User Key  (r) = Recorded By, (t) = Taken By, (c) = Cosigned By    Initials Name Provider Type    AB Kalee Bethea, PTA Physical Therapy Assistant    Jose Mo, PTA Physical Therapy Assistant    Monica Adams, RN Registered Nurse                Physical Therapy Education                 Title: PT OT SLP Therapies (In Progress)     Topic: Physical Therapy (In Progress)     Point: Mobility training (Done)     Learning Progress Summary           Patient Acceptance, E,TB,D, BULMARO,DU,NR by SUKUMAR at 3/11/2022 0933    Comment: Education re: purpose of PT/importance of activity, for safety/falls prevention, for improved tech w/ tfers sit to/from stand, moving bed to chair, for proper use of rwx, NWB L LE, wearing schedule of brace, proper positioning of brace & mgmt of brace                   Point: Home exercise program (Done)     Learning Progress Summary           Patient Acceptance, E,TB,D, VU,DU,NR by SUKUMAR at 3/11/2022 0933    Comment: Education re: purpose of PT/importance of activity, for safety/falls prevention, for improved tech w/ tfers sit to/from stand, moving bed to chair, for proper use of rwx, NWB L LE, wearing schedule of brace, proper positioning of brace & mgmt of brace                   Point: Body mechanics (Not Started)     Learner Progress:  Not documented in this visit.          Point: Precautions (Done)     Learning Progress Summary           Patient Acceptance, E,TB,D, VU,DU,NR by SUKUMAR at 3/11/2022 0933    Comment: Education re:  purpose of PT/importance of activity, for safety/falls prevention, for improved tech w/ tfers sit to/from stand, moving bed to chair, for proper use of rwx, NWB L LE, wearing schedule of brace, proper positioning of brace & mgmt of brace                               User Key     Initials Effective Dates Name Provider Type Discipline    JE 08/02/18 -  Amairani Nieves, PT Physical Therapist PT              PT Recommendation and Plan             Time Calculation:    PT Charges     Row Name 03/13/22 1400             Time Calculation    Start Time 1400  -AB      Stop Time 1417  -AB      Time Calculation (min) 17 min  -AB      PT Received On 03/13/22  -AB              Time Calculation- PT    Total Timed Code Minutes- PT 17 minute(s)  -AB            User Key  (r) = Recorded By, (t) = Taken By, (c) = Cosigned By    Initials Name Provider Type    AB Kalee Bethea PTA Physical Therapy Assistant              Therapy Charges for Today     Code Description Service Date Service Provider Modifiers Qty    [id number] HC PT THERAPEUTIC ACT EA 15 MIN 3/13/2022 Kalee Bethea PTA GP 1          PT G-Codes  Outcome Measure Options: AM-PAC 6 Clicks Basic Mobility (PT)  AM-PAC 6 Clicks Score (PT): 19    Kalee Bethea PTA  3/13/2022

## 2022-03-13 NOTE — PLAN OF CARE
Goal Outcome Evaluation:  Plan of Care Reviewed With: patient           Outcome Evaluation: Patient c/o nausea - emesis noted x1. PO zofran given. C/o pain - right rib fb. lidoderm patches applied. NWB to LLE - immobilizer. BG elevated SS insulin given. Safety maintained B/p elevated - scheduled clonidine added

## 2022-03-13 NOTE — PLAN OF CARE
Goal Outcome Evaluation:  Plan of Care Reviewed With: patient        Progress: improving  Outcome Evaluation: Ntn follow up. She cont to have a pretty good appetite/intake. She did have some N/V this morning. She felt better after zofran. She has pain related to rib fx's. She cont on a C.CHO, low potassium diet. She prefers to get softer foods r/t poor dentition. She cont to get a Novasource Renal supplement with dinner tray to supplement intake. She is aware of alternate selections as needed. She has consumed 63% of the last 4 meals documented. Cont to follow.

## 2022-03-13 NOTE — PROGRESS NOTES
[facility] Medicine Services  INPATIENT PROGRESS NOTE    Patient Name: Ena Upton  Date of Admission: 3/5/2022  Today's Date: 03/13/22  Length of Stay: 8  Primary Care Physician: Yaron Wiggins APRN    Subjective   Chief Complaint: Weakness, rib pain.   HPI   Most recent blood sugar 214.  Sugars have been ranging in the upper 100s, low 220s recently.  Need to resume some insulin tonight.    He does indeed have fractures of the right fourth, fifth, sixth, and seventh ribs laterally.  No pneumothorax.  Lidoderm patch applied.    She had an episode of nauseousness and vomiting earlier.  She feels better now after getting oral Zofran.    Review of Systems   All pertinent negatives and positives are as above. All other systems have been reviewed and are negative unless otherwise stated.     Objective    Temp:  [97.9 °F (36.6 °C)-98.6 °F (37 °C)] 98.6 °F (37 °C)  Heart Rate:  [72-77] 75  Resp:  [16-18] 16  BP: (137-177)/(48-77) 170/48  Physical Exam  Constitutional:       Appearance: She is well-developed. She is ill-appearing (chronically).      Comments: Seen on dialysis. No distress.  No family present.  Discussed with her nurse, Khadra.    HENT:      Head: Normocephalic and atraumatic.      Mouth/Throat:      Comments: Poor dentition.   Eyes:      Conjunctiva/sclera: Conjunctivae normal.      Pupils: Pupils are equal, round, and reactive to light.   Neck:      Vascular: No JVD.   Cardiovascular:      Rate and Rhythm: Normal rate and regular rhythm.      Heart sounds: Normal heart sounds.      Comments: Sinus rhythm in the 60s/70s.  Pulmonary:      Effort: Pulmonary effort is normal. No respiratory distress.      Breath sounds: Normal breath sounds.      Comments: On room air.   Chest:      Chest wall: Tenderness (right lower ribs, anterior (no rash)) present.   Abdominal:      General: Bowel sounds are normal. There is no distension.      Palpations: Abdomen is soft.       Tenderness: There is no abdominal tenderness.   Musculoskeletal:         General: No tenderness or deformity. Normal range of motion.      Cervical back: Neck supple.      Comments: Left UE AVF.    Skin:     General: Skin is warm and dry.      Findings: No rash.   Neurological:      General: No focal deficit present.      Mental Status: She is alert and oriented to person, place, and time.      Cranial Nerves: No cranial nerve deficit.      Motor: Weakness present. No abnormal muscle tone.      Deep Tendon Reflexes: Reflexes normal.   Psychiatric:      Comments: Conversational.        Results Review:  I have reviewed the labs, radiology results, and diagnostic studies.    Laboratory Data:   Results from last 7 days   Lab Units 03/13/22  0503 03/11/22  0741 03/09/22  0559   WBC 10*3/mm3 3.69 4.70 4.61   HEMOGLOBIN g/dL 8.1* 8.1* 7.6*   HEMATOCRIT % 27.3* 27.0* 24.7*   PLATELETS 10*3/mm3 209 199 182     Results from last 7 days   Lab Units 03/13/22  0503 03/11/22  0628 03/09/22  0559 03/07/22  0426   SODIUM mmol/L 133* 134* 132* 128*   POTASSIUM mmol/L 4.4 5.5* 4.5 5.1   CHLORIDE mmol/L 99 95* 95* 93*   CO2 mmol/L 26.0 20.0* 24.0 22.0   BUN mg/dL 23* 27* 29* 36*   CREATININE mg/dL 3.06* 3.49* 3.62* 4.01*   CALCIUM mg/dL 8.6 8.5* 8.2* 8.1*   BILIRUBIN mg/dL  --   --   --  0.4   ALK PHOS U/L  --   --   --  103   ALT (SGPT) U/L  --   --   --  13   AST (SGOT) U/L  --   --   --  23   GLUCOSE mg/dL 180* 193* 121* 117*     I have reviewed the patient's current medications.     Assessment/Plan     Active Hospital Problems    Diagnosis    • **Metabolic encephalopathy    • HHNC (hyperglycemic hyperosmolar nonketotic coma) ([facility])    • Uncontrolled type 2 diabetes mellitus with hyperglycemia ([facility])    • Hypertensive emergency    • ESRD on hemodialysis ([facility])    • Hyponatremia    • Hyperkalemia    • Closed fracture of multiple ribs of right side    • Nondisplaced spiral fracture of shaft of left femur, subsequent encounter for closed  fracture with delayed healing    • Acute respiratory failure secondary to an inability to protect her airway    • Tobacco abuse    • Anemia, chronic disease    • Elevated troponin due to ESRD and hypertensive urgency    • Hypothyroidism      Plan:  The patient was admitted on 3/5 by Dr. Prado.  She was found unresponsive.  EMS arrived on the scene and placed an LMA.  She was ultimately intubated in the emergency department.  She had a blood sugar of greater than 900 on arrival and was also severely hypertensive.  She has end-stage renal disease and does dialysis on Tuesday, Thursday, and Saturday.    She required ventilatory support in the emergency department secondary to altered mental status and inability to protect her airway.  She was extubated on Sunday, 3/6.  She has been evaluated by pulmonary.  She remains on room air.    Nephrology consulted to assist with dialysis.  She received dialysis on Saturday in the intensive care unit setting.  She required a medical treatment for hyperkalemia on the afternoon of 3/6 and also received a dose of Lokelma.  Potassium level 5.5 yesterday so nephrology gave another dose of Lokelma. Dialysis last yesterday.     She has significant problems with hypertension and is on multiple medications for this.  Dr. Prado had not been able to resume all of them.  She has previously required Cardene. I resumed Imdur, Procardia, and pushed her carvedilol and hydralazine back to her typical doses on the morning of 3/7.  Placed back on her clonidine patch on 3/10.  Increase nifedipine XL to 90 mg on 3/12. She is no longer on lisinopril or spironolactone per recent medication reconciliation.    She had 1 bottle positive for Staphylococcus that was coagulase-negative.  Stopped vancomycin on 3/7. Consider this to be a contaminant.  She has also recently been on ceftriaxone.  This was also stopped to monitor.     She was initially on an insulin drip.  This was removed on 3/6.  She was  reordered long-acting insulin and sliding scale insulin.  However, she has had some problems with hypoglycemia and was recently placed on dextrose fluids, which we stopped 3/9.  Completely stopped long-acting insulin for now, but will resume a lower dose tonight.  Her most recent hemoglobin A1c was 12.4 in January 2022.    Plain films of the left femur continues to show an oblique/spiral type fracture of the left distal femoral diaphysis extending to the metaphysis with no significant displacement.  The patient had been told to be nonweightbearing at the [facility].  She has not seen orthopedics since the fracture was first discovered at [facility] in January. Dr. Hatfield with ortho saw her. He recommended a knee immobilizer when active and during sleep but may unstrap immobilizer while immobile, throughout the day.  Okay for gentle knee range of motion with PT. Nonweightbearing to left lower extremity.    She started complaining of some rib discomfort recently.  Plain films of right ribs on 3/12 showed multiple rib fractures on the right laterally.  Applying Lidoderm patches.  Make her Norco every 4 hours as needed rather than every 6 hours as needed.    PT/OT.     Offered a nicotine patch and counseled for tobacco cessation.    Reconciled and resumed other home medications as appropriate.    Speech therapy cleared for regular foods with thin liquids on 3/7.    Pepcid for peptic ulcer prophylaxis.    Lovenox for DVT prophylaxis.    Transferred to the floor on 3/7.    Discharge Planning: More agreeable to look in to placement. Marcella Tafoya with [facility] sent multiple referrals.     Electronically signed by Thomas Oliver DO, 03/13/22, 11:40 CDT.

## 2022-03-14 NOTE — PLAN OF CARE
Goal Outcome Evaluation:  Plan of Care Reviewed With: patient        Progress: improving  Outcome Evaluation: OT tx completed. AxO x4. C/o significant pain in L LE that radiates from hip to foot and pain in R ribs. MaxA to don L knee immobilizer sitting up in bed. SBA for supine to sit bed mobility w c/o lightheadedness upon initial sitting and symptoms resolve within approx 1 min. Requires Clarita and vc for sit<>stand t/f. Stand pivots to bedside commode. Simulated sit<>stand t/f on bedside commode w Clarita. Requires max vc to maintain NWB in L LE when performing all t/f. Pt is unable to maintain NWB status approx 50% of the time. Is aware she is putting weight through it; however, states she is too weak to put more weight through B UEs w front-wheeled walker. Edu on B UE exercises to improve UE strength and verbalizes understanding. Discussed d/c planning and pt is eager to go to [facility] for rehabilitation. Cont OT POC. Recommended d/c SNF.

## 2022-03-14 NOTE — PROGRESS NOTES
Nephrology ([facility]) Progress Note      Patient:  Ena Upton  YOB: 1970  Date of Service: 3/14/2022  MRN: [id number]   Acct: [id number]   Primary Care Physician: Yaron Wiggins APRN  Advance Directive:   Code Status and Medical Interventions:   Ordered at: 03/07/22 0841     Level Of Support Discussed With:    Patient     Code Status (Patient has no pulse and is not breathing):    CPR (Attempt to Resuscitate)     Medical Interventions (Patient has pulse or is breathing):    Full Support     Admit Date: 3/5/2022       Hospital Day: 9  Referring Provider: No Known Provider      Patient personally seen and examined.  Complete chart including Consults, Notes, Operative Reports, Labs, Cardiology, and Radiology studies reviewed as able.        Subjective:  Ena Upton is a 52 y.o. female for whom we were consulted for evaluation and treatment of end stage renal disease. Maintenance hemodialysis on Tuesday Thursday Saturday at [facility].  Frequently skips her dialysis treatments. History of type 2 diabetes, hypertension, hypothyroid. Found down at home on 3/05 and brought to ER via EMS. Initial blood glucose over 900 and patient required intubation. Was on Cardene drip due to hypertension. Extubated on 3/06.  Following IV and PO potassium supplementation, she developed hyperkalemia of 6.6. This was managed medically. Sent to medical floor on 3/07. On 3/08 developed significant hypoglycemia. Ultimately required resumption of dextrose IV fluids to maintain blood glucose. Completed HD on 3/08 without issue. Additional HD on 3/10 & 3/12.  Patient has had imaging which has shown right 4-7th rib fractures secondary to a recent fall as well as a previously documented left distal femur fracture.    Today is feeling a bit better, blood glucose fairly stable overnight.      Allergies:  Penicillins, Lamisil [terbinafine], Reglan [metoclopramide], and Stadol  [butorphanol]    Home Meds:  Medications Prior to Admission   Medication Sig Dispense Refill Last Dose   • acetaminophen (TYLENOL) 325 MG tablet Take 2 tablets by mouth Every 6 (Six) Hours As Needed for Mild Pain .   Unknown at Unknown time   • albuterol (PROVENTIL HFA;VENTOLIN HFA) 108 (90 BASE) MCG/ACT inhaler Inhale 2 puffs Every 4 (Four) Hours As Needed for wheezing.   Unknown at Unknown time   • cloNIDine (CATAPRES-TTS) 0.1 MG/24HR patch Place 1 patch on the skin as directed by provider 1 (One) Time Per Week.   Unknown at Unknown time   • DULoxetine (CYMBALTA) 60 MG capsule Take 60 mg by mouth daily.   Unknown at Unknown time   • famotidine (PEPCID) 20 MG tablet Take 20 mg by mouth Daily.   Unknown at Unknown time   • hydrALAZINE (APRESOLINE) 50 MG tablet Take 50 mg by mouth 3 (Three) Times a Day.   Unknown at Unknown time   • insulin aspart (novoLOG FLEXPEN) 100 UNIT/ML solution pen-injector sc pen Inject 14 Units under the skin into the appropriate area as directed 3 (Three) Times a Day With Meals.   Unknown at Unknown time   • Insulin Glargine (BASAGLAR KWIKPEN) 100 UNIT/ML injection pen Inject 70 Units under the skin into the appropriate area as directed Every Night.   Unknown at Unknown time   • isosorbide mononitrate (IMDUR) 120 MG 24 hr tablet Take 120 mg by mouth Daily.      • lactulose (CHRONULAC) 10 GM/15ML solution Take 10 g by mouth 3 (Three) Times a Day With Meals. For constipation - HOLD for loose stools      • levothyroxine (SYNTHROID, LEVOTHROID) 200 MCG tablet Take 200 mcg by mouth Daily.   Unknown at Unknown time   • minoxidil (LONITEN) 2.5 MG tablet Take 2.5 mg by mouth Daily.   Unknown at Unknown time   • ondansetron (ZOFRAN) 4 MG tablet Take 4 mg by mouth Every 8 (Eight) Hours As Needed for Nausea or Vomiting.   Unknown at Unknown time   • senna (senna) 8.6 MG tablet Take 1 tablet by mouth Daily.   Unknown   • simvastatin (ZOCOR) 20 MG tablet Take 20 mg by mouth Daily.   Unknown at Unknown  time   • vitamin D (ERGOCALCIFEROL) 1.25 MG (57722 UT) capsule capsule Take 50,000 Units by mouth Every 14 (Fourteen) Days.   Unknown at Unknown time       Medicines:  Current Facility-Administered Medications   Medication Dose Route Frequency Provider Last Rate Last Admin   • acetaminophen (TYLENOL) tablet 650 mg  650 mg Oral Q4H PRN Thomas Oliver DO   650 mg at 03/09/22 1754    Or   • acetaminophen (TYLENOL) 160 MG/5ML solution 650 mg  650 mg Oral Q4H PRN Thomas Oliver DO        Or   • acetaminophen (TYLENOL) suppository 650 mg  650 mg Rectal Q4H PRN Thomas Oliver DO       • atorvastatin (LIPITOR) tablet 20 mg  20 mg Oral Nightly Thomas Oliver DO   20 mg at 03/13/22 2034   • carvedilol (COREG) tablet 25 mg  25 mg Oral BID With Meals Thomas Oliver DO   25 mg at 03/14/22 0823   • cloNIDine (CATAPRES) tablet 0.1 mg  0.1 mg Oral Q12H Thomas Oliver DO   0.1 mg at 03/14/22 0822   • cloNIDine (CATAPRES-TTS) 0.1 MG/24HR patch 1 patch  1 patch Transdermal Weekly Thomas Oliver DO   1 patch at 03/10/22 2058   • dextrose (D50W) (25 g/50 mL) IV injection 12.5 g  12.5 g Intravenous PRN Thomas Oliver DO   12.5 g at 03/06/22 2212   • dextrose (D50W) (25 g/50 mL) IV injection 25 g  25 g Intravenous Q15 Min PRN Thomas Oliver DO   25 g at 03/08/22 0922   • dextrose (GLUTOSE) oral gel 15 g  15 g Oral Q15 Min PRN Thomas Oliver DO   15 g at 03/14/22 0631   • DULoxetine (CYMBALTA) DR capsule 60 mg  60 mg Oral Daily Thomas Oliver DO   60 mg at 03/14/22 0822   • enoxaparin (LOVENOX) syringe 30 mg  30 mg Subcutaneous Q24H Thomas Oliver DO   30 mg at 03/13/22 1346   • epoetin yolanda-epbx (RETACRIT) injection 10,000 Units  10,000 Units Subcutaneous Once per day on Mon Wed Fri Eron Blood MD   10,000 Units at 03/14/22 0836   • famotidine (PEPCID) tablet 20 mg  20 mg Oral Daily Thomas Oliver DO   20 mg at 03/14/22 0822   • glucagon (human recombinant) (GLUCAGEN DIAGNOSTIC) injection 1 mg  1 mg  Intramuscular Q15 Min PRN Thomas Oliver, DO   1 mg at 03/14/22 0654   • hydrALAZINE (APRESOLINE) tablet 75 mg  75 mg Oral Q8H Thomas Oliver, DO   75 mg at 03/14/22 0438   • HYDROcodone-acetaminophen (NORCO)  MG per tablet 1 tablet  1 tablet Oral Q4H PRN Thomas Oliver, DO   1 tablet at 03/14/22 0438   • insulin detemir (LEVEMIR) injection 10 Units  10 Units Subcutaneous Nightly Thomas Oliver, DO   10 Units at 03/13/22 2033   • insulin lispro (humaLOG) injection 2-7 Units  2-7 Units Subcutaneous Q4H Thomas Oliver, DO   3 Units at 03/13/22 2033   • ipratropium-albuterol (DUO-NEB) nebulizer solution 3 mL  3 mL Nebulization 4x Daily - RT Thomas Oliver, DO   3 mL at 03/14/22 1015   • isosorbide mononitrate (IMDUR) 24 hr tablet 60 mg  60 mg Oral Q24H Thomas Oliver, DO   60 mg at 03/14/22 0823   • labetalol (NORMODYNE,TRANDATE) injection 10 mg  10 mg Intravenous Q4H PRN Thomas Oliver, DO   10 mg at 03/07/22 0411   • lactulose solution 20 g  20 g Oral Daily Thomas Oliver, DO   20 g at 03/14/22 0825   • levothyroxine (SYNTHROID, LEVOTHROID) tablet 200 mcg  200 mcg Oral Q AM Thomas Oliver, DO   200 mcg at 03/14/22 0439   • lidocaine (LIDODERM) 5 % 2 patch  2 patch Transdermal Q24H Thomas Oliver, DO   2 patch at 03/14/22 0820   • nicotine (NICODERM CQ) 14 MG/24HR patch 1 patch  1 patch Transdermal Q24H Thomas Oliver, DO   1 patch at 03/14/22 0825   • NIFEdipine XL (PROCARDIA XL) 24 hr tablet 90 mg  90 mg Oral Q24H Thomas Oliver, DO   90 mg at 03/14/22 0823   • ondansetron (ZOFRAN) injection 4 mg  4 mg Intravenous Q6H PRN Thomas Oliver DO       • ondansetron ODT (ZOFRAN-ODT) disintegrating tablet 4 mg  4 mg Oral Q6H PRN Thomas Oliver DO   4 mg at 03/14/22 0823   • senna (SENOKOT) tablet 1 tablet  1 tablet Oral Daily Thomas Oliver DO   1 tablet at 03/14/22 0825   • sodium chloride 0.45 % infusion  50 mL/hr Intravenous Continuous PRN Thomas Oliver DO       • sodium chloride 0.9 % flush  10 mL  10 mL Intravenous Q12H Thomas Oliver, DO   10 mL at 03/10/22 2058   • sodium chloride 0.9 % flush 10 mL  10 mL Intravenous PRN Thomas Oliver DO       • sodium chloride 0.9 % infusion  50 mL/hr Intravenous Continuous PRN Thomas Oliver, DO       • sodium chloride 0.9 % infusion  10 mL/hr Intravenous Continuous PRN Thomas Oliver, DO       • sodium zirconium cyclosilicate (LOKELMA) pack 10 g  10 g Oral Once Delmar Salazar, GAEL           Past Medical History:  Past Medical History:   Diagnosis Date   • Anxiety    • Arthritis    • Chronic kidney disease     STAGE V RENAL FAILURE   • Depression    • Diabetes mellitus (HCC)    • HTN (hypertension)    • Hypothyroidism    • Obesity    • Tremors of nervous system        Past Surgical History:  Past Surgical History:   Procedure Laterality Date   • ARTERIOVENOUS FISTULA     • BREAST BIOPSY     • CARPAL TUNNEL RELEASE     • CATH LAB PROCEDURE     • CHOLECYSTECTOMY     • TUBAL ABDOMINAL LIGATION         Family History  Family History   Problem Relation Age of Onset   • Cancer Other    • Hypertension Other    • Kidney disease Other    • Heart disease Other    • Diabetes Other    • Diabetes Mother    • Diabetes Maternal Aunt    • Diabetes Maternal Grandmother        Social History  Social History     Socioeconomic History   • Marital status:    Tobacco Use   • Smoking status: Current Every Day Smoker     Packs/day: 0.50   • Smokeless tobacco: Never Used   • Tobacco comment: CUTTING BACK AND TRYING TO STOP SMOKING   Substance and Sexual Activity   • Alcohol use: No   • Drug use: No   • Sexual activity: Defer       Review of Systems:  History obtained from chart review and the patient  General ROS: No fever or chills  Respiratory ROS: No cough, shortness of breath, wheezing  Cardiovascular ROS: No chest pain or palpitations  Gastrointestinal ROS: No abdominal pain or melena  Genito-Urinary ROS: No dysuria or hematuria  Psych ROS: No anxiety and  depression  14 point ROS reviewed with the patient and negative except as noted above and in the HPI unless unable to obtain.    Objective:  Patient Vitals for the past 24 hrs:   BP Temp Temp src Pulse Resp SpO2 Weight   03/14/22 1022 -- -- -- 72 -- 99 % --   03/14/22 1015 -- -- -- 70 15 98 % --   03/14/22 0815 (!) 185/69 -- -- -- -- -- --   03/14/22 0745 (!) 184/109 97.7 °F (36.5 °C) Oral 71 16 98 % --   03/14/22 0630 -- -- -- 92 -- 100 % --   03/14/22 0623 -- -- -- 90 14 99 % --   03/14/22 0500 -- -- -- -- -- -- 60.2 kg (132 lb 12.8 oz)   03/14/22 0421 153/64 98.1 °F (36.7 °C) Oral 93 16 100 % --   03/14/22 0037 164/72 98.6 °F (37 °C) Oral 72 16 90 % --   03/13/22 1930 176/65 98.8 °F (37.1 °C) Oral 78 16 93 % --   03/13/22 1902 -- -- -- 76 18 93 % --   03/13/22 1857 -- -- -- 74 18 92 % --   03/13/22 1500 174/68 98.3 °F (36.8 °C) Oral 71 16 91 % --   03/13/22 1429 -- -- -- 75 -- -- --   03/13/22 1422 -- -- -- 77 16 94 % --   03/13/22 1132 170/48 98.6 °F (37 °C) Oral 75 16 92 % --       Intake/Output Summary (Last 24 hours) at 3/14/2022 1102  Last data filed at 3/13/2022 1700  Gross per 24 hour   Intake 480 ml   Output --   Net 480 ml     General: awake/alert   Chest:  clear to auscultation bilaterally without respiratory distress  CVS: regular rate and rhythm  Abdominal: soft, nontender, positive bowel sounds  Extremities: no cyanosis or edema  Skin: warm and dry without rash   Neuro: no focal motor deficits      Labs:  Results from last 7 days   Lab Units 03/14/22  0435 03/13/22  0503 03/11/22  0741   WBC 10*3/mm3 3.88 3.69 4.70   HEMOGLOBIN g/dL 8.4* 8.1* 8.1*   HEMATOCRIT % 28.6* 27.3* 27.0*   PLATELETS 10*3/mm3 228 209 199         Results from last 7 days   Lab Units 03/14/22  0435 03/13/22  0503 03/11/22  0628   SODIUM mmol/L 135* 133* 134*   POTASSIUM mmol/L 5.4* 4.4 5.5*   CHLORIDE mmol/L 97* 99 95*   CO2 mmol/L 25.0 26.0 20.0*   BUN mg/dL 35* 23* 27*   CREATININE mg/dL 4.28* 3.06* 3.49*   CALCIUM mg/dL 8.8  8.6 8.5*   EGFR mL/min/1.73 11.8* 17.6* 15.1*   GLUCOSE mg/dL 52* 180* 193*       Radiology:   Imaging Results (Last 72 Hours)     Procedure Component Value Units Date/Time    XR Ribs 2 View Right [[id number]] Collected: 03/12/22 1322     Updated: 03/12/22 1327    Narrative:      EXAMINATION:  XR RIBS 2 VW RIGHT-  3/12/2022 1:14 PM CST     HISTORY: Rib pain after fall.     TECHNIQUE: 2 views and 4 images were obtained of the ribs.     COMPARISON: No comparison study.     FINDINGS:  There are nondisplaced fractures of the right fourth, fifth,  sixth and seventh ribs laterally. There is no evidence of right-sided  pneumothorax.       Impression:      Acute right rib fractures, as described.     This report was finalized on 03/12/2022 13:24 by Dr. Aubrey Lam MD.          Culture:  Blood Culture   Date Value Ref Range Status   03/05/2022 Staphylococcus, coagulase negative (C)  Preliminary   03/05/2022 No growth at 24 hours  Preliminary     Urine Culture   Date Value Ref Range Status   03/05/2022 50,000 CFU/mL Staphylococcus, coagulase negative (A)  Final     Comment:     By laboratory criteria, addititional testing is not indicated and unlikely to produce clinically relevant information.  Coagulase negative staphylococci are common skin contaminants, members of the oral lilly,  and of low virulence; requires clinical correlation.           Assessment   1.  End stage renal disease on HD TTS  2.  Type 2 diabetes   3.  Hypertension  4.  Hyperosmolar hyperglycemia--improved  5.  Metabolic acidosis--improved  6.  Metabolic encephalopathy--resolving  7.  Hyponatremia  8.  Anemia of CKD  9.  Secondary hyperparathyroidism  10.  Hyperkalemia     Plan:  1.  Dialysis next due 3/15  2.  Lokelma 10 gm PO once now  3.  Monitor labs    Delmar Salazar, GAEL  3/14/2022  11:02 CDT

## 2022-03-14 NOTE — PLAN OF CARE
Goal Outcome Evaluation:  Plan of Care Reviewed With: patient           Outcome Evaluation: Patient c/o left leg pain. PRN PO pain meds given with good relief. Immobilizer on. BG stable today. SS insulin given. PT/OT following. Up to chair. K 5.4 -Lokelma given. Safety maintained

## 2022-03-14 NOTE — THERAPY TREATMENT NOTE
Acute Care - Occupational Therapy Treatment Note  [facility]     Patient Name: Ena Upton  : 1970  MRN: [id number]  Today's Date: 3/14/2022             Admit Date: 3/5/2022       ICD-10-CM ICD-9-CM   1. Altered mental status, unspecified altered mental status type  R41.82 780.97   2. Dysphagia, unspecified type  R13.10 787.20   3. Cognitive and behavioral changes  R41.89 799.59    R46.89 312.9   4. Gait abnormality  R26.9 781.2   5. 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)   • Non-ketotic hyperosmolar coma ([facility]ly [facility])   • HTN (hypertension)   • Intractable nausea and vomiting   • Constipation   • Gastroparesis due to DM ([facility])   • Hyperglycemia   • Seizure ([facility])   • ESRD on hemodialysis ([facility])   • Sacral insufficiency fracture   • Hypertensive urgency   • Uncontrolled type 2 diabetes mellitus with hyperglycemia ([facility])   • Hyperkalemia   • Metabolic encephalopathy   • Hypertensive emergency   • Acute pulmonary edema ([facility])   • Hypothyroidism   • HHNC (hyperglycemic hyperosmolar nonketotic coma) ([facility]ly [facility])   • Uremic encephalopathy syndrome   • Hyponatremia   • Elevated troponin due to ESRD and hypertensive urgency   • Acute respiratory failure secondary to an inability to protect her airway   • Tobacco abuse   • Anemia, chronic disease   • Nondisplaced spiral fracture of shaft of left femur, subsequent encounter for closed fracture with delayed healing   • Closed fracture of multiple ribs of right side     Past Medical History:   Diagnosis Date   • Anxiety    • Arthritis    • Chronic kidney disease     STAGE V RENAL FAILURE   • Depression    • Diabetes mellitus (HCC)    • HTN (hypertension)    • Hypothyroidism    • Obesity    • Tremors of nervous system      Past Surgical History:   Procedure Laterality Date   • ARTERIOVENOUS FISTULA     • BREAST BIOPSY     • CARPAL TUNNEL RELEASE     • CATH LAB PROCEDURE      • CHOLECYSTECTOMY     • TUBAL ABDOMINAL LIGATION           OT ASSESSMENT FLOWSHEET (last 12 hours)     OT Evaluation and Treatment     Row Name 03/14/22 1310 03/14/22 1300                OT Time and Intention    Subjective Information complains of;weakness;pain;fatigue  -MW (r) JR (t) MW (c) --  -MW (r) JR (t) MW (c)       Document Type therapy note (daily note)  -MW (r) JR (t) MW (c) --       Mode of Treatment occupational therapy  -MW (r) JR (t) MW (c) --       Patient Effort good  -MW (r) JR (t) MW (c) --                General Information    Patient Profile Reviewed yes  -MW (r) JR (t) MW (c) --       Existing Precautions/Restrictions fall;brace worn when out of bed;non-weight bearing  Knee immobilizer L LE, NWB L LE  -MW (r) JR (t) MW (c) --                Pain Assessment    Pretreatment Pain Rating 8/10  -MW (r) JR (t) MW (c) --       Posttreatment Pain Rating 8/10  -MW (r) JR (t) MW (c) --       Pain Location - Side/Orientation Left  -MW (r) JR (t) MW (c) --       Pain Location lower  -MW (r) JR (t) MW (c) --       Pain Location - extremity  -MW (r) JR (t) MW (c) --       Pre/Posttreatment Pain Comment Pain in L LE that radiates down leg and c/o pain in R ribs  -MW (r) JR (t) MW (c) --       Pain Intervention(s) Medication (See MAR);Repositioned;Ambulation/increased activity  -MW (r) JR (t) MW (c) --                Cognition    Orientation Status (Cognition) oriented x 4  -MW (r) JR (t) MW (c) --                Activities of Daily Living    BADL Assessment/Intervention toileting  -MW (r) JR (t) MW (c) --                Toileting Assessment/Training    [place] Level (Toileting) toileting skills;moderate assist (50% patient effort)  -MW (r) JR (t) MW (c) --       Assistive Devices (Toileting) commode, bedside without drop arms  -MW (r) JR (t) MW (c) --       Position (Toileting) supported sitting;supported standing  -KALEN (r)  (t) KALEN (c) --       Comment, (Toileting) simulated t/f to bedside commode and  requires max vc to maintain NWB in L LE. Pt is unable to maintain NWB status approx 50% of the time.  -MW (r) JR (t) MW (c) --                Bed Mobility    Bed Mobility supine-sit  -MW (r) JR (t) MW (c) --                Transfer Assessment/Treatment    Transfers sit-stand transfer;toilet transfer  -MW (r) JR (t) MW (c) --                Transfers    Sit-Stand [place] (Transfers) minimum assist (75% patient effort);verbal cues  -MW (r) JR (t) MW (c) --       [place] Level (Toilet Transfer) minimum assist (75% patient effort);verbal cues  -MW (r) JR (t) MW (c) --       Assistive Device (Toilet Transfer) commode, bedside without drop arms  -MW (r) JR (t) MW (c) --                Sit-Stand Transfer    Assistive Device (Sit-Stand Transfers) walker, front-wheeled  -MW (r) JR (t) MW (c) --                Toilet Transfer    Type (Toilet Transfer) sit-stand;stand-sit  -MW (r) JR (t) MW (c) --       Comment, (Toilet Transfer) vc to maintain NWB in L LE  -MW (r) JR (t) MW (c) --                Balance    Balance Assessment sitting static balance;sitting dynamic balance;standing static balance;standing dynamic balance;sit to stand dynamic balance  -MW (r) JR (t) MW (c) --       Static Sitting Balance independent  -MW (r) JR (t) MW (c) --       Dynamic Sitting Balance supervision  -MW (r) JR (t) MW (c) --       Position, Sitting Balance unsupported;sitting edge of bed;sitting in chair  -MW (r) JR (t) MW (c) --       Sit to Stand Dynamic Balance minimal assist;verbal cues  -MW (r) JR (t) MW (c) --       Static Standing Balance contact guard;verbal cues  -MW (r) JR (t) MW (c) --       Dynamic Standing Balance contact guard;verbal cues  -MW (r) JR (t) MW (c) --       Position/Device Used, Standing Balance walker, front-wheeled  -MW (r) JR (t) MW (c) --                Wound 03/05/22 2000 Right coccyx Pressure Injury    Wound - Properties Group Placement Date: 03/05/22  -KS Placement Time: 2000 -KS Present on Hospital  Admission: Y  -KS Side: Right  -KS Location: coccyx  -KS Primary Wound Type: Pressure inj  -KS       Retired Wound - Properties Group Placement Date: 03/05/22 -KS Placement Time: 2000 -KS Present on Hospital Admission: Y  -KS Side: Right  -KS Location: coccyx  -KS Primary Wound Type: Pressure inj  -KS       Retired Wound - Properties Group Date first assessed: 03/05/22 -KS Time first assessed: 2000  -KS Present on Hospital Admission: Y  -KS Side: Right  -KS Location: coccyx  -KS Primary Wound Type: Pressure inj  -KS                Plan of Care Review    Plan of Care Reviewed With patient  -KALEN (r)  (t) KALEN (c) --       Progress improving  -KALEN (r)  (t) KALEN (c) --       Outcome Evaluation OT tx completed. AxO x4. C/o significant pain in L LE that radiates from hip to foot and pain in R ribs. MaxA to don L knee immobilizer sitting up in bed. SBA for supine to sit bed mobility w c/o lightheadedness upon initial sitting and symptoms resolve within approx 1 min. Requires Clarita and vc for sit<>stand t/f. Stand pivots to bedside commode. Simulated sit<>stand t/f on bedside commode w Clarita. Requires max vc to maintain NWB in L LE when performing all t/f. Pt is unable to maintain NWB status approx 50% of the time. Is aware she is putting weight through it; however, states she is too weak to put more weight through B UEs w front-wheeled walker. Edu on B UE exercises to improve UE strength and verbalizes understanding. Discussed d/c planning and pt is eager to go to [facility] for rehabilitation. Cont OT POC. Recommended d/c SNF.  -KALEN (r)  (t) KALEN (c) --                Positioning and Restraints    Pre-Treatment Position in bed  -MW (r)  (t) KALEN (c) --       Post Treatment Position chair  -KALEN (r)  (t) KALEN (c) --       In Chair reclined;call light within reach;encouraged to call for assist;LLE elevated  -MW (r)  (t) MW (c) --                Therapy Assessment/Plan (OT)    Rehab Potential (OT) good, to achieve stated  therapy goals  -MW (r) JR (t) MW (c) --       Criteria for Skilled Therapeutic Interventions Met (OT) yes;skilled treatment is necessary  -MW (r) JR (t) MW (c) --       Therapy Frequency (OT) 5 times/wk  -MW (r) JR (t) MW (c) --       Predicted Duration of Therapy Intervention (OT) 10 days  -MW (r) JR (t) MW (c) --                Therapy Plan Review/Discharge Plan (OT)    Anticipated Discharge Disposition (OT) skilled nursing facility  -MW (r) JR (t) MW (c) --             User Key  (r) = Recorded By, (t) = Taken By, (c) = Cosigned By    Initials Name Effective Dates    Monica Adams RN 06/16/21 -     Neda Greene, OTR/L 08/28/18 -     Dana Tai, OT Student 12/28/21 -                  Occupational Therapy Education                 Title: PT OT SLP Therapies (In Progress)     Topic: Occupational Therapy (Done)     Point: ADL training (Done)     Description:   Instruct learner(s) on proper safety adaptation and remediation techniques during self care or transfers.   Instruct in proper use of assistive devices.              Learning Progress Summary           Patient Acceptance, E, VU,NR by  at 3/11/2022 0948                   Point: Home exercise program (Done)     Description:   Instruct learner(s) on appropriate technique for monitoring, assisting and/or progressing therapeutic exercises/activities.              Learning Progress Summary           Patient Acceptance, E, VU,NR by  at 3/11/2022 0948                   Point: Precautions (Done)     Description:   Instruct learner(s) on prescribed precautions during self-care and functional transfers.              Learning Progress Summary           Patient Acceptance, E, VU,NR by  at 3/11/2022 0948                               User Key     Initials Effective Dates Name Provider Type Discipline     12/28/21 -  Dana Woo, OT Student OT Student OT                  OT Recommendation and Plan  Planned Therapy Interventions (OT): activity  tolerance training, adaptive equipment training, BADL retraining, functional balance retraining, occupation/activity based interventions, patient/caregiver education/training, strengthening exercise, transfer/mobility retraining, orthotic fabrication/fitting/training  Therapy Frequency (OT): 5 times/wk  Plan of Care Review  Plan of Care Reviewed With: patient  Progress: improving  Outcome Evaluation: OT tx completed. AxO x4. C/o significant pain in L LE that radiates from hip to foot and pain in R ribs. MaxA to don L knee immobilizer sitting up in bed. SBA for supine to sit bed mobility w c/o lightheadedness upon initial sitting and symptoms resolve within approx 1 min. Requires Clarita and vc for sit<>stand t/f. Stand pivots to bedside commode. Simulated sit<>stand t/f on bedside commode w Clarita. Requires max vc to maintain NWB in L LE when performing all t/f. Pt is unable to maintain NWB status approx 50% of the time. Is aware she is putting weight through it; however, states she is too weak to put more weight through B UEs w front-wheeled walker. Edu on B UE exercises to improve UE strength and verbalizes understanding. Discussed d/c planning and pt is eager to go to [facility] for rehabilitation. Cont OT POC. Recommended d/c SNF.  Plan of Care Reviewed With: patient  Outcome Evaluation: OT tx completed. AxO x4. C/o significant pain in L LE that radiates from hip to foot and pain in R ribs. MaxA to don L knee immobilizer sitting up in bed. SBA for supine to sit bed mobility w c/o lightheadedness upon initial sitting and symptoms resolve within approx 1 min. Requires Clarita and vc for sit<>stand t/f. Stand pivots to bedside commode. Simulated sit<>stand t/f on bedside commode w Clarita. Requires max vc to maintain NWB in L LE when performing all t/f. Pt is unable to maintain NWB status approx 50% of the time. Is aware she is putting weight through it; however, states she is too weak to put more weight through B UEs w  front-wheeled walker. Edu on B UE exercises to improve UE strength and verbalizes understanding. Discussed d/c planning and pt is eager to go to [facility] for rehabilitation. Cont OT POC. Recommended d/c SNF.        Time Calculation:    Time Calculation- OT     Row Name 03/14/22 1310             Time Calculation- OT    OT Start Time 1307  -MW (r) JR (t) MW (c)      OT Stop Time 1338  -MW (r) JR (t) MW (c)      OT Time Calculation (min) 31 min  -MW (r) JR (t)      OT Received On 03/14/22  -MW (r) JR (t) MW (c)            User Key  (r) = Recorded By, (t) = Taken By, (c) = Cosigned By    Initials Name Provider Type    MW Neda Guzman, OTR/L Occupational Therapist    Dana Tai, OT Student OT Student                       Dana Woo, OT Student  3/14/2022

## 2022-03-14 NOTE — PROGRESS NOTES
[facility] Medicine Services  INPATIENT PROGRESS NOTE    Length of Stay: 9  Date of Admission: 3/5/2022  Primary Care Physician: Yaron Wiggins APRN    Subjective   Chief Complaint: Left leg pain/right rib pain.    HPI   Discussed the patient pain medication as needed.  Ongoing dialysis per nephrology.  Blood pressure is high.  Patient is afebrile.    Review of Systems   Constitutional: Positive for activity change, appetite change and fatigue. Negative for chills and fever.   HENT: Negative for hearing loss, nosebleeds, tinnitus and trouble swallowing.    Eyes: Negative for visual disturbance.   Respiratory: Negative for cough, chest tightness, shortness of breath and wheezing.    Cardiovascular: Negative for chest pain, palpitations and leg swelling.   Gastrointestinal: Negative for abdominal distention, abdominal pain, blood in stool, constipation, diarrhea, nausea and vomiting.   Endocrine: Negative for cold intolerance, heat intolerance, polydipsia, polyphagia and polyuria.   Genitourinary: Negative for decreased urine volume, difficulty urinating, dysuria, flank pain, frequency and hematuria.   Musculoskeletal: Positive for arthralgias, gait problem and myalgias. Negative for joint swelling.   Skin: Negative for rash.   Allergic/Immunologic: Negative for immunocompromised state.   Neurological: Positive for weakness. Negative for dizziness, syncope, light-headedness and headaches.   Hematological: Negative for adenopathy. Does not bruise/bleed easily.   Psychiatric/Behavioral: Negative for confusion and sleep disturbance. The patient is not nervous/anxious.           All pertinent negatives and positives are as above. All other systems have been reviewed and are negative unless otherwise stated.     Objective    Temp:  [97.7 °F (36.5 °C)-98.8 °F (37.1 °C)] 97.7 °F (36.5 °C)  Heart Rate:  [71-93] 71  Resp:  [14-18] 16  BP: (153-184)/() 184/109    Intake/Output  Summary (Last 24 hours) at 3/14/2022 0752  Last data filed at 3/13/2022 1700  Gross per 24 hour   Intake 720 ml   Output --   Net 720 ml     Physical Exam  Vitals and nursing note reviewed.   Constitutional:       Appearance: She is well-developed.   HENT:      Head: Normocephalic.   Eyes:      Conjunctiva/sclera: Conjunctivae normal.      Pupils: Pupils are equal, round, and reactive to light.   Neck:      Vascular: No JVD.   Cardiovascular:      Rate and Rhythm: Normal rate and regular rhythm.      Heart sounds: Normal heart sounds. No murmur heard.    No friction rub. No gallop.   Pulmonary:      Effort: Pulmonary effort is normal. No respiratory distress.      Breath sounds: Normal breath sounds. No wheezing or rales.      Comments: Diminished breath sound bilateral, clear.  Chest:      Chest wall: No tenderness.   Abdominal:      General: Bowel sounds are normal. There is no distension.      Palpations: Abdomen is soft.      Tenderness: There is no abdominal tenderness. There is no guarding or rebound.   Musculoskeletal:         General: No tenderness or deformity.      Cervical back: Neck supple.      Comments: Left femur fracture.  Regular fracture.   Skin:     General: Skin is warm and dry.      Capillary Refill: Capillary refill takes 2 to 3 seconds.      Findings: No rash.   Neurological:      General: No focal deficit present.      Mental Status: She is alert and oriented to person, place, and time.      Cranial Nerves: No cranial nerve deficit.      Motor: Weakness present. No abnormal muscle tone.      Coordination: Coordination abnormal.      Gait: Gait abnormal.      Deep Tendon Reflexes: Reflexes normal.   Psychiatric:         Behavior: Behavior normal.         Thought Content: Thought content normal.         Results Review:  Lab Results (last 24 hours)     Procedure Component Value Units Date/Time    POC Glucose Once [[id number]]  (Normal) Collected: 03/14/22 0717    Specimen: Blood Updated: 03/14/22  0737     Glucose 90 mg/dL      Comment: : [id number] Otis RachelMeter ID: [id number]       POC Glucose Once [[id number]]  (Abnormal) Collected: 03/14/22 0652    Specimen: Blood Updated: 03/14/22 0703     Glucose 59 mg/dL      Comment: : [id number] Otis RachelMeter ID: [id number]       POC Glucose Once [[id number]]  (Abnormal) Collected: 03/14/22 0629    Specimen: Blood Updated: 03/14/22 0641     Glucose 61 mg/dL      Comment: : [id number] Otis RachelMeter ID: [id number]       POC Glucose Once [[id number]]  (Abnormal) Collected: 03/14/22 0613    Specimen: Blood Updated: 03/14/22 0624     Glucose 64 mg/dL      Comment: : [id number] Manjeet Sun AmberMeter ID: [id number]       POC Glucose Once [[id number]]  (Normal) Collected: 03/14/22 0542    Specimen: Blood Updated: 03/14/22 0556     Glucose 73 mg/dL      Comment: : [id number] Otis RachelMeter ID: [id number]       Basic Metabolic Panel [[id number]]  (Abnormal) Collected: 03/14/22 0435    Specimen: Blood Updated: 03/14/22 0551     Glucose 52 mg/dL      BUN 35 mg/dL      Creatinine 4.28 mg/dL      Sodium 135 mmol/L      Potassium 5.4 mmol/L      Chloride 97 mmol/L      CO2 25.0 mmol/L      Calcium 8.8 mg/dL      BUN/Creatinine Ratio 8.2     Anion Gap 13.0 mmol/L      eGFR 11.8 mL/min/1.73      Comment: <15 Indicative of kidney failure       Narrative:      GFR Normal >60  Chronic Kidney Disease <60  Kidney Failure <15      CBC (No Diff) [[id number]]  (Abnormal) Collected: 03/14/22 0435    Specimen: Blood Updated: 03/14/22 0519     WBC 3.88 10*3/mm3      RBC 3.00 10*6/mm3      Hemoglobin 8.4 g/dL      Hematocrit 28.6 %      MCV 95.3 fL      MCH 28.0 pg      MCHC 29.4 g/dL      RDW 17.7 %      RDW-SD 61.1 fl      MPV 9.4 fL      Platelets 228 10*3/mm3     POC Glucose Once [[id number]]  (Abnormal) Collected: 03/14/22 0453    Specimen: Blood Updated: 03/14/22 0504     Glucose 49 mg/dL      Comment: : [id number] Berg RachelMeter ID: [id number]        POC Glucose Once [[id number]]  (Abnormal) Collected: 03/14/22 0442    Specimen: Blood Updated: 03/14/22 0454     Glucose 50 mg/dL      Comment: : [id number] Otis RachelMeter ID: [id number]       POC Glucose Once [[id number]]  (Normal) Collected: 03/14/22 0024    Specimen: Blood Updated: 03/14/22 0035     Glucose 113 mg/dL      Comment: : [id number] Otis RachelMeter ID: [id number]       POC Glucose Once [[id number]]  (Abnormal) Collected: 03/13/22 2028    Specimen: Blood Updated: 03/13/22 2045     Glucose 201 mg/dL      Comment: : [id number] Otis RachelMeter ID: [id number]       POC Glucose Once [[id number]]  (Abnormal) Collected: 03/13/22 1641    Specimen: Blood Updated: 03/13/22 1643     Glucose 188 mg/dL      Comment: : [id number] Domingo Vega) MalloryMeter ID: [id number]       POC Glucose Once [[id number]]  (Abnormal) Collected: 03/13/22 1136    Specimen: Blood Updated: 03/13/22 1148     Glucose 219 mg/dL      Comment: : [id number] Domingo (Arron) MalloryMeter ID: [id number]       POC Glucose Once [[id number]]  (Abnormal) Collected: 03/13/22 0747    Specimen: Blood Updated: 03/13/22 0757     Glucose 214 mg/dL      Comment: : [id number] Domingo Vega) MalloryMeter ID: [id number]              Cultures:  No results found for: BLOODCX, URINECX, WOUNDCX, MRSACX, RESPCX, STOOLCX    Radiology Data:    Imaging Results (Last 24 Hours)     ** No results found for the last 24 hours. **          Allergies   Allergen Reactions   • Penicillins Hives and Shortness Of Breath   • Lamisil [Terbinafine]    • Reglan [Metoclopramide] GI Intolerance   • Stadol [Butorphanol] Nausea And Vomiting       Scheduled meds:   atorvastatin, 20 mg, Oral, Nightly  carvedilol, 25 mg, Oral, BID With Meals  cloNIDine, 0.1 mg, Oral, Q12H  cloNIDine, 1 patch, Transdermal, Weekly  DULoxetine, 60 mg, Oral, Daily  enoxaparin, 30 mg, Subcutaneous, Q24H  epoetin yolanda/yolanda-epbx, 10,000 Units, Subcutaneous, Once per day on Mon  Wed Fri  famotidine, 20 mg, Oral, Daily  hydrALAZINE, 75 mg, Oral, Q8H  insulin detemir, 10 Units, Subcutaneous, Nightly  insulin lispro, 2-7 Units, Subcutaneous, Q4H  ipratropium-albuterol, 3 mL, Nebulization, 4x Daily - RT  isosorbide mononitrate, 60 mg, Oral, Q24H  lactulose, 20 g, Oral, Daily  levothyroxine, 200 mcg, Oral, Q AM  lidocaine, 2 patch, Transdermal, Q24H  nicotine, 1 patch, Transdermal, Q24H  NIFEdipine XL, 90 mg, Oral, Q24H  senna, 1 tablet, Oral, Daily  sodium chloride, 10 mL, Intravenous, Q12H        PRN meds:  •  acetaminophen **OR** acetaminophen **OR** acetaminophen  •  dextrose  •  dextrose  •  dextrose  •  glucagon (human recombinant)  •  HYDROcodone-acetaminophen  •  labetalol  •  ondansetron  •  ondansetron ODT  •  sodium chloride  •  sodium chloride  •  sodium chloride  •  sodium chloride    Assessment/Plan       Metabolic encephalopathy    ESRD on hemodialysis ([facility])    Uncontrolled type 2 diabetes mellitus with hyperglycemia ([facility])    Hyperkalemia    Hypertensive emergency    Hypothyroidism    HHNC (hyperglycemic hyperosmolar nonketotic coma) ([facility])    Hyponatremia    Elevated troponin due to ESRD and hypertensive urgency    Acute respiratory failure secondary to an inability to protect her airway    Tobacco abuse    Anemia, chronic disease    Nondisplaced spiral fracture of shaft of left femur, subsequent encounter for closed fracture with delayed healing    Closed fracture of multiple ribs of right side      Plan:  HPI . the patient was admitted on 3/5 by Dr. Prado.  She was found unresponsive.  EMS arrived on the scene and placed an LMA.  She was ultimately intubated in the emergency department.  She had a blood sugar of greater than 900 on arrival and was also severely hypertensive.  She has end-stage renal disease and does dialysis on Tuesday, Thursday, and Saturday.    Respiratory failure.  She required ventilatory support in the emergency department secondary to altered mental  status and inability to protect her airway.  She was extubated on Sunday, 3/6.  She has been evaluated by pulmonary.  She remains on room air.    Renal failure/dialysis patient . nephrology consult.  She received dialysis on Saturday in the intensive care unit setting.     Hyperkalemia.   She required a medical treatment for hyperkalemia on the afternoon of 3/6 and also received a dose of Lokelma.  Potassium level 5.5 yesterday so nephrology gave another dose of Lokelma. Dialysis last yesterday.     No sign of infection.  She had 1 bottle positive for Staphylococcus that was coagulase-negative.  Stopped vancomycin on 3/7. Consider this to be a contaminant.  She has also recently been on ceftriaxone.  This was also stopped to monitor.     Diabetes.  She was initially on an insulin drip.  This was removed on 3/6.  She was reordered long-acting insulin and sliding scale insulin.  However, she has had some problems with hypoglycemia and was recently placed on dextrose fluids, which we stopped 3/9.  Completely stopped long-acting insulin for now, but will resume a lower dose tonight.  Her most recent hemoglobin A1c was 12.4 in January 2022.  Decrease Levemir . low sliding scale.  Hemoglobin A1 C 9.7.    Fracture of left distal femur diaphysis extending to the metaphysis with no significant displacement.  The patient had been told to be nonweightbearing at the [facility] nursing facility.  She has not seen orthopedics since the fracture was first discovered at [facility] in January. Dr. Hatfield with ortho saw her. He recommended a knee immobilizer when active and during sleep but may unstrap immobilizer while immobile, throughout the day.  Okay for gentle knee range of motion with PT. Nonweightbearing to left lower extremity.    Right rib fractures.  Duo nebs.  Lidocaine patch.  Plain films of right ribs on 3/12 showed multiple rib fractures on the right laterally.  Applying Lidoderm patches.  Make her Norco every 4 hours as  needed rather than every 6 hours as needed.    Hypertension/hyperlipidemia/CAD.  Lipitor.  Coreg.  Catapres.  Hydralazine.  Imdur.  Labetalol as needed.    Anemia.  Erythropoietin.    Depression.  Cymbalta.    Reflux.  Pepcid prophylaxis.  Zofran as needed.    Constipation.  Lactulose.  Senokot.    Pain.  Norco as needed.    Hypothyroidism.  Synthroid.    Lovenox DVT prophylaxis.    Chronic smoker.  Discussed patient cutting back and stopping.  Offer nicotine patch.  Nicotine patch.    Nutrition . speech consult.  Regular/thin/consistent carb/low potassium diet.  Nova source renal dietary supplement.    Deconditioning.  PT and OT consult.     Blood oltchlk-xrhnwmkox-xthbjnmt stap 1 out of 2 bottles h-probably contamination.  Urine culture-coagulase staph negative-probably contamination . Covid-19-negative.    Discharge Planning: Waiting for rehab placement.    Electronically signed by Narinder Madrid MD, 03/14/22, 7:52 AM CDT.

## 2022-03-14 NOTE — THERAPY TREATMENT NOTE
Acute Care - Physical Therapy Treatment Note  [facility]     Patient Name: Ena Upton  : 1970  MRN: [id number]  Today's Date: 3/14/2022      Visit Dx:     ICD-10-CM ICD-9-CM   1. Altered mental status, unspecified altered mental status type  R41.82 780.97   2. Dysphagia, unspecified type  R13.10 787.20   3. Cognitive and behavioral changes  R41.89 799.59    R46.89 312.9   4. Gait abnormality  R26.9 781.2   5. Decreased activities of daily living (ADL)  Z78.9 V49.89     Patient Active Problem List   Diagnosis   • CKD stage 4 due to type 2 diabetes mellitus ([facility])   • Non-ketotic hyperosmolar coma ([facility])   • HTN (hypertension)   • Intractable nausea and vomiting   • Constipation   • Gastroparesis due to DM ([facility])   • Hyperglycemia   • Seizure ([facility])   • ESRD on hemodialysis ([facility])   • Sacral insufficiency fracture   • Hypertensive urgency   • Uncontrolled type 2 diabetes mellitus with hyperglycemia ([facility])   • Hyperkalemia   • Metabolic encephalopathy   • Hypertensive emergency   • Acute pulmonary edema ([facility])   • Hypothyroidism   • HHNC (hyperglycemic hyperosmolar nonketotic coma) ([facility])   • Uremic encephalopathy syndrome   • Hyponatremia   • Elevated troponin due to ESRD and hypertensive urgency   • Acute respiratory failure secondary to an inability to protect her airway   • Tobacco abuse   • Anemia, chronic disease   • Nondisplaced spiral fracture of shaft of left femur, subsequent encounter for closed fracture with delayed healing   • Closed fracture of multiple ribs of right side     Past Medical History:   Diagnosis Date   • Anxiety    • Arthritis    • Chronic kidney disease     STAGE V RENAL FAILURE   • Depression    • Diabetes mellitus (HCC)    • HTN (hypertension)    • Hypothyroidism    • Obesity    • Tremors of nervous system      Past Surgical History:   Procedure Laterality Date   • ARTERIOVENOUS FISTULA     • BREAST BIOPSY     • CARPAL TUNNEL RELEASE     • CATH LAB PROCEDURE     • CHOLECYSTECTOMY      • TUBAL ABDOMINAL LIGATION       PT Assessment (last 12 hours)     PT Evaluation and Treatment     Row Name 03/14/22 1435 03/14/22 0900       Physical Therapy Time and Intention    Subjective Information complains of;weakness;fatigue;pain  - --    Document Type therapy note (daily note)  -JENNIFER --    Mode of Treatment physical therapy  -JENNIFER --    Session Not Performed -- patient/family declined treatment  -    Row Name 03/14/22 1435          General Information    Existing Precautions/Restrictions fall;non-weight bearing;left  knee immobilizer LLE  -     Row Name 03/14/22 1435          Pain    Pretreatment Pain Rating 8/10  -JENNIFER     Posttreatment Pain Rating 8/10  -JENNIFER     Pain Location generalized  -     Pre/Posttreatment Pain Comment pt c/o pain in LLE, ribs, and bottom  -     Row Name 03/14/22 1435          Bed Mobility    Comment, (Bed Mobility) in the chair  -     Row Name 03/14/22 1435          Transfers    Comment, (Transfers) practiced sit to stand x 5  -JENNIFER     Sit-Stand [place] (Transfers) verbal cues;contact guard;minimum assist (75% patient effort)  -     Row Name 03/14/22 1435          Sit-Stand Transfer    Assistive Device (Sit-Stand Transfers) walker, front-wheeled  -     Row Name 03/14/22 1435          Motor Skills    Therapeutic Exercise aerobic  -     Row Name 03/14/22 1435          Aerobic Exercise    Comment, Aerobic Exercise (Therapeutic Exercise) AROM BLE X 20  -     Row Name             Wound 03/05/22 2000 Right coccyx Pressure Injury    Wound - Properties Group Placement Date: 03/05/22 -KS Placement Time: 2000  -KS Present on Hospital Admission: Y  -KS Side: Right  -KS Location: coccyx  -KS Primary Wound Type: Pressure inj  -KS     Retired Wound - Properties Group Placement Date: 03/05/22  -KS Placement Time: 2000 -KS Present on Hospital Admission: Y  -KS Side: Right  -KS Location: coccyx  -KS Primary Wound Type: Pressure inj  -KS     Retired Wound - Properties Group  Date first assessed: 03/05/22  -KS Time first assessed: 2000 -KS Present on Hospital Admission: Y  -KS Side: Right  -KS Location: coccyx  -KS Primary Wound Type: Pressure inj  -KS     Row Name 03/14/22 1435          Positioning and Restraints    Pre-Treatment Position sitting in chair/recliner  -JENNIFER     Post Treatment Position chair  -JENNIFER     In Chair reclined;call light within reach;encouraged to call for assist  -JENNIFER           User Key  (r) = Recorded By, (t) = Taken By, (c) = Cosigned By    Initials Name Provider Type    Jose Mo, PTA Physical Therapy Assistant    Monica Adams RN Registered Nurse                Physical Therapy Education                 Title: PT OT SLP Therapies (In Progress)     Topic: Physical Therapy (In Progress)     Point: Mobility training (Done)     Learning Progress Summary           Patient Acceptance, E,TB,D, VU,DU,NR by SUKUMAR at 3/11/2022 0933    Comment: Education re: purpose of PT/importance of activity, for safety/falls prevention, for improved tech w/ tfers sit to/from stand, moving bed to chair, for proper use of rwx, NWB L LE, wearing schedule of brace, proper positioning of brace & mgmt of brace                   Point: Home exercise program (Done)     Learning Progress Summary           Patient Acceptance, E,TB,D, VU,DU,NR by SUKUMAR at 3/11/2022 0933    Comment: Education re: purpose of PT/importance of activity, for safety/falls prevention, for improved tech w/ tfers sit to/from stand, moving bed to chair, for proper use of rwx, NWB L LE, wearing schedule of brace, proper positioning of brace & mgmt of brace                   Point: Body mechanics (Not Started)     Learner Progress:  Not documented in this visit.          Point: Precautions (Done)     Learning Progress Summary           Patient Acceptance, E,TB,D, VU,DU,NR by SUKUMAR at 3/11/2022 0933    Comment: Education re: purpose of PT/importance of activity, for safety/falls prevention, for improved tech w/ tfers  sit to/from stand, moving bed to chair, for proper use of rwx, NWB L LE, wearing schedule of brace, proper positioning of brace & mgmt of brace                               User Key     Initials Effective Dates Name Provider Type Discipline     08/02/18 -  Amairani Nieves, PT Physical Therapist PT              PT Recommendation and Plan             Time Calculation:    PT Charges     Row Name 03/14/22 1435             Time Calculation    Start Time 1435  -JENNIFER      Stop Time 1500  -EJNNIFER      Time Calculation (min) 25 min  -JENNIFER      PT Received On 03/14/22  -JENNIFER              Time Calculation- PT    Total Timed Code Minutes- PT 25 minute(s)  -JENNIFER              Timed Charges    61944 - PT Therapeutic Exercise Minutes 10  -JENNIFER      14627 - PT Therapeutic Activity Minutes 15  -JENNIFER              Total Minutes    Timed Charges Total Minutes 25  -JENNIFER       Total Minutes 25  -JENNIFER            User Key  (r) = Recorded By, (t) = Taken By, (c) = Cosigned By    Initials Name Provider Type    Jose Mo PTA Physical Therapy Assistant              Therapy Charges for Today     Code Description Service Date Service Provider Modifiers Qty    41667613371 HC PT THERAPEUTIC ACT EA 15 MIN 3/14/2022 Jose Parker PTA GP 1    44052561524 HC PT THER PROC EA 15 MIN 3/14/2022 Jose Parker, JOSE GP 1          PT G-Codes  Outcome Measure Options: (P) AM-PAC 6 Clicks Daily Activity (OT)  AM-PAC 6 Clicks Score (PT): 19  AM-PAC 6 Clicks Score (OT): (P) 17    Jose Parker PTA  3/14/2022

## 2022-03-14 NOTE — PLAN OF CARE
Goal Outcome Evaluation:  Plan of Care Reviewed With: patient        Progress: no change  Outcome Evaluation: BG this am 50 snack given with improvement to 73. Glucogel given x2 with another snack for BG 63, 61. to recheck. Skin care provided as needed. Safety maintained.

## 2022-03-14 NOTE — CASE MANAGEMENT/SOCIAL WORK
Continued Stay Note   [place]     Patient Name: Ena Upton  MRN: [id number]  Today's Date: 3/14/2022    Admit Date: 3/5/2022     Discharge Plan     Row Name 03/14/22 1150       Plan    [facility]    Patient/Family in Agreement with Plan yes    Plan [facility]. [facility] and she has [facility]. They will start precert.               Discharge Codes    No documentation.                     GREGORIO Angeles

## 2022-03-15 NOTE — THERAPY TREATMENT NOTE
Acute Care - Occupational Therapy Treatment Note  [facility]     Patient Name: Ean Upton  : 1970  MRN: [id number]  Today's Date: 3/15/2022             Admit Date: 3/5/2022       ICD-10-CM ICD-9-CM   1. Altered mental status, unspecified altered mental status type  R41.82 780.97   2. Dysphagia, unspecified type  R13.10 787.20   3. Cognitive and behavioral changes  R41.89 799.59    R46.89 312.9   4. Gait abnormality  R26.9 781.2   5. 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)   • Non-ketotic hyperosmolar coma ([facility])   • HTN (hypertension)   • Intractable nausea and vomiting   • Constipation   • Gastroparesis due to DM ([facility])   • Hyperglycemia   • Seizure ([facility])   • ESRD on hemodialysis ([facility])   • Sacral insufficiency fracture   • Hypertensive urgency   • Uncontrolled type 2 diabetes mellitus with hyperglycemia ([facility])   • Hyperkalemia   • Metabolic encephalopathy   • Hypertensive emergency   • Acute pulmonary edema ([facility])   • Hypothyroidism   • HHNC (hyperglycemic hyperosmolar nonketotic coma) ([facility])   • Uremic encephalopathy syndrome   • Hyponatremia   • Elevated troponin due to ESRD and hypertensive urgency   • Acute respiratory failure secondary to an inability to protect her airway   • Tobacco abuse   • Anemia, chronic disease   • Nondisplaced spiral fracture of shaft of left femur, subsequent encounter for closed fracture with delayed healing   • Closed fracture of multiple ribs of right side     Past Medical History:   Diagnosis Date   • Anxiety    • Arthritis    • Chronic kidney disease     STAGE V RENAL FAILURE   • Depression    • Diabetes mellitus (HCC)    • HTN (hypertension)    • Hypothyroidism    • Obesity    • Tremors of nervous system      Past Surgical History:   Procedure Laterality Date   • ARTERIOVENOUS FISTULA     • BREAST BIOPSY     • CARPAL TUNNEL RELEASE     • CATH LAB PROCEDURE      • CHOLECYSTECTOMY     • TUBAL ABDOMINAL LIGATION           OT ASSESSMENT FLOWSHEET (last 12 hours)     OT Evaluation and Treatment     Row Name 03/15/22 0724                   OT Time and Intention    Subjective Information complains of;weakness;fatigue  -        Document Type therapy note (daily note)  -        Mode of Treatment occupational therapy  -        Patient Effort good  -                  General Information    Existing Precautions/Restrictions fall;non-weight bearing  LLE!  -                  Pain Assessment    Pretreatment Pain Rating 0/10 - no pain  -        Posttreatment Pain Rating 0/10 - no pain  -        Pain Intervention(s) Rest  -                  Bed Mobility    Comment, (Bed Mobility) fowlers in bed!  -                  Motor Skills    Therapeutic Exercise shoulder;elbow/forearm  -                  Shoulder (Therapeutic Exercise)    Shoulder (Therapeutic Exercise) AROM (active range of motion);strengthening exercise  -        Shoulder AROM (Therapeutic Exercise) bilateral;flexion;extension;sitting;15 repititions;2 sets  -        Shoulder Strengthening (Therapeutic Exercise) bilateral;flexion;extension;sitting;2 lb free weight;3 lb free weight;15 repititions;2 sets  -                  Elbow/Forearm (Therapeutic Exercise)    Elbow/Forearm (Therapeutic Exercise) AROM (active range of motion);strengthening exercise  -        Elbow/Forearm AROM (Therapeutic Exercise) bilateral;flexion;extension;sitting;15 repititions;2 sets  -        Elbow/Forearm Strengthening (Therapeutic Exercise) bilateral;flexion;extension;sitting;2 lb free weight;3 lb free weight;15 repititions;2 sets  -                  Wound 03/05/22 2000 Right coccyx Pressure Injury    Wound - Properties Group Placement Date: 03/05/22  -KS Placement Time: 2000  -KS Present on Hospital Admission: Y  -KS Side: Right  -KS Location: coccyx  -KS Primary Wound Type: Pressure inj  -KS        Retired Wound -  Properties Group Placement Date: 03/05/22  -KS Placement Time: 2000 -KS Present on Hospital Admission: Y  -KS Side: Right  -KS Location: coccyx  -KS Primary Wound Type: Pressure inj  -KS        Retired Wound - Properties Group Date first assessed: 03/05/22  -KS Time first assessed: 2000 -KS Present on Hospital Admission: Y  -KS Side: Right  -KS Location: coccyx  -KS Primary Wound Type: Pressure inj  -KS                  Wound 03/15/22 0618 Bilateral perineum Blisters    Wound - Properties Group Placement Date: 03/15/22  -VS Placement Time: 0618  -VS Present on Hospital Admission: Y  -VS Side: Bilateral  -VS Location: perineum  -VS Primary Wound Type: Blisters  -VS        Retired Wound - Properties Group Placement Date: 03/15/22  -VS Placement Time: 0618  -VS Present on Hospital Admission: Y  -VS Side: Bilateral  -VS Location: perineum  -VS Primary Wound Type: Blisters  -VS        Retired Wound - Properties Group Date first assessed: 03/15/22  -VS Time first assessed: 0618  -VS Present on Hospital Admission: Y  -VS Side: Bilateral  -VS Location: perineum  -VS Primary Wound Type: Blisters  -VS                  Wound 03/15/22 0620 Bilateral groin Blisters    Wound - Properties Group Placement Date: 03/15/22  -VS Placement Time: 0620  -VS Present on Hospital Admission: Y  -VS Side: Bilateral  -VS Location: groin  -VS Primary Wound Type: Blisters  -VS        Retired Wound - Properties Group Placement Date: 03/15/22  -VS Placement Time: 0620  -VS Present on Hospital Admission: Y  -VS Side: Bilateral  -VS Location: groin  -VS Primary Wound Type: Blisters  -VS        Retired Wound - Properties Group Date first assessed: 03/15/22  -VS Time first assessed: 0620  -VS Present on Hospital Admission: Y  -VS Side: Bilateral  -VS Location: groin  -VS Primary Wound Type: Blisters  -VS                  Positioning and Restraints    Pre-Treatment Position in bed  -CJ        Post Treatment Position bed  -CJ        In Bed fowlers;call  light within reach;encouraged to call for assist;side rails up x2  -                  Progress Summary (OT)    Progress Toward Functional Goals (OT) progress toward functional goals is good  -        Barriers to Overall Progress (OT) Fall/NWB LLE!  -        Impairments Still Limiting Function (OT) continue with ot poc!  -              User Key  (r) = Recorded By, (t) = Taken By, (c) = Cosigned By    Initials Name Effective Dates     Zack Tafoya COTA 06/16/21 -     Monica Adams RN 06/16/21 -     VS Misty Wheeler RN 07/07/21 -                  Occupational Therapy Education                 Title: PT OT SLP Therapies (In Progress)     Topic: Occupational Therapy (Done)     Point: ADL training (Done)     Description:   Instruct learner(s) on proper safety adaptation and remediation techniques during self care or transfers.   Instruct in proper use of assistive devices.              Learning Progress Summary           Patient Acceptance, E, VU,NR by  at 3/11/2022 0948                   Point: Home exercise program (Done)     Description:   Instruct learner(s) on appropriate technique for monitoring, assisting and/or progressing therapeutic exercises/activities.              Learning Progress Summary           Patient Acceptance, E,TB, VU,DU,NR by  at 3/15/2022 0724    Comment: Pt. completed ue exs to increase her transfers!    Acceptance, E, VU,NR by  at 3/11/2022 0948                   Point: Precautions (Done)     Description:   Instruct learner(s) on prescribed precautions during self-care and functional transfers.              Learning Progress Summary           Patient Acceptance, E,TB, VU,DU,NR by  at 3/15/2022 0724    Comment: Pt. completed ue exs to increase her transfers!    Acceptance, E, VU,NR by  at 3/11/2022 0948                               User Key     Initials Effective Dates Name Provider Type Discipline     06/16/21 -  Zack Tafoya COTA Occupational Therapy  Assistant OT     12/28/21 -  Dana Woo, OT Student OT Student OT                  OT Recommendation and Plan     Progress Toward Functional Goals (OT): progress toward functional goals is good  Plan of Care Review  Plan of Care Reviewed With: patient  Progress: improving  Outcome Evaluation: Pt. able to complete her ue exs to increase her transfers and amb with rwx!  Plan of Care Reviewed With: patient  Outcome Evaluation: Pt. able to complete her ue exs to increase her transfers and amb with rwx!     Outcome Measures     Row Name 03/15/22 0724             How much help from another is currently needed...    Putting on and taking off regular lower body clothing? 2  -CJ      Bathing (including washing, rinsing, and drying) 2  -CJ      Toileting (which includes using toilet bed pan or urinal) 2  -CJ      Putting on and taking off regular upper body clothing 3  -CJ      Taking care of personal grooming (such as brushing teeth) 4  -CJ      Eating meals 4  -CJ      AM-PAC 6 Clicks Score (OT) 17  -CJ              Functional Assessment    Outcome Measure Options AM-PAC 6 Clicks Daily Activity (OT)  -CJ            User Key  (r) = Recorded By, (t) = Taken By, (c) = Cosigned By    Initials Name Provider Type    Zack Whipple COTA Occupational Therapy Assistant                Time Calculation:    Time Calculation- OT     Row Name 03/15/22 0724             Time Calculation- OT    OT Start Time 0724  -      OT Stop Time 0748  -      OT Time Calculation (min) 24 min  -      Total Timed Code Minutes- OT 24 minute(s)  -      OT Received On 03/15/22  -              Timed Charges    20010 - OT Therapeutic Exercise Minutes 24  -CJ              Total Minutes    Timed Charges Total Minutes 24  -CJ       Total Minutes 24  -CJ            User Key  (r) = Recorded By, (t) = Taken By, (c) = Cosigned By    Initials Name Provider Type    Zack Whipple COTA Occupational Therapy Assistant              Therapy  Charges for Today     Code Description Service Date Service Provider Modifiers Qty    [id number] HC OT THER PROC EA 15 MIN 3/15/2022 Zack Tafoya, RACHEL GO 2               RACHEL Santiago  3/15/2022

## 2022-03-15 NOTE — PROGRESS NOTES
Nephrology ([facility]) Progress Note      Patient:  Ena Upton  YOB: 1970  Date of Service: 3/15/2022  MRN: [id number]   Acct: [id number]   Primary Care Physician: Yaron Wiggins APRN  Advance Directive:   Code Status and Medical Interventions:   Ordered at: 03/07/22 0841     Level Of Support Discussed With:    Patient     Code Status (Patient has no pulse and is not breathing):    CPR (Attempt to Resuscitate)     Medical Interventions (Patient has pulse or is breathing):    Full Support     Admit Date: 3/5/2022       Hospital Day: 10  Referring Provider: No Known Provider      Patient personally seen and examined.  Complete chart including Consults, Notes, Operative Reports, Labs, Cardiology, and Radiology studies reviewed as able.        Subjective:  Ena Upton is a 52 y.o. female for whom we were consulted for evaluation and treatment of end stage renal disease. Maintenance hemodialysis on Tuesday Thursday Saturday at [facility].  Frequently skips her dialysis treatments. History of type 2 diabetes, hypertension, hypothyroid. Found down at home on 3/05 and brought to ER via EMS. Initial blood glucose over 900 and patient required intubation. Was on Cardene drip due to hypertension. Extubated on 3/06.  Following IV and PO potassium supplementation, she developed hyperkalemia of 6.6. This was managed medically. Sent to medical floor on 3/07. On 3/08 developed significant hypoglycemia. Ultimately required resumption of dextrose IV fluids to maintain blood glucose. Completed HD on 3/08 without issue. Additional HD on 3/10 & 3/12.  Patient has had imaging which has shown right 4-7th rib fractures secondary to a recent fall as well as a previously documented left distal femur fracture.    Today is seen during dialysis, tolerating well. Complaint of left leg pain and right chest wall pain with activity.  No new overnight issues  Dialysis   Pt was seen  on RRT. Tolerating well  Modality: Hemodialysis  Access: Arterial Venous Fistula  Location: left upper  QB: 450  QD: 700  UF: 3000    Allergies:  Penicillins, Lamisil [terbinafine], Reglan [metoclopramide], and Stadol [butorphanol]    Home Meds:  Medications Prior to Admission   Medication Sig Dispense Refill Last Dose   • acetaminophen (TYLENOL) 325 MG tablet Take 2 tablets by mouth Every 6 (Six) Hours As Needed for Mild Pain .   Unknown at Unknown time   • albuterol (PROVENTIL HFA;VENTOLIN HFA) 108 (90 BASE) MCG/ACT inhaler Inhale 2 puffs Every 4 (Four) Hours As Needed for wheezing.   Unknown at Unknown time   • cloNIDine (CATAPRES-TTS) 0.1 MG/24HR patch Place 1 patch on the skin as directed by provider 1 (One) Time Per Week.   Unknown at Unknown time   • DULoxetine (CYMBALTA) 60 MG capsule Take 60 mg by mouth daily.   Unknown at Unknown time   • famotidine (PEPCID) 20 MG tablet Take 20 mg by mouth Daily.   Unknown at Unknown time   • hydrALAZINE (APRESOLINE) 50 MG tablet Take 50 mg by mouth 3 (Three) Times a Day.   Unknown at Unknown time   • insulin aspart (novoLOG FLEXPEN) 100 UNIT/ML solution pen-injector sc pen Inject 14 Units under the skin into the appropriate area as directed 3 (Three) Times a Day With Meals.   Unknown at Unknown time   • Insulin Glargine (BASAGLAR KWIKPEN) 100 UNIT/ML injection pen Inject 70 Units under the skin into the appropriate area as directed Every Night.   Unknown at Unknown time   • isosorbide mononitrate (IMDUR) 120 MG 24 hr tablet Take 120 mg by mouth Daily.      • lactulose (CHRONULAC) 10 GM/15ML solution Take 10 g by mouth 3 (Three) Times a Day With Meals. For constipation - HOLD for loose stools      • levothyroxine (SYNTHROID, LEVOTHROID) 200 MCG tablet Take 200 mcg by mouth Daily.   Unknown at Unknown time   • minoxidil (LONITEN) 2.5 MG tablet Take 2.5 mg by mouth Daily.   Unknown at Unknown time   • ondansetron (ZOFRAN) 4 MG tablet Take 4 mg by mouth Every 8 (Eight) Hours  As Needed for Nausea or Vomiting.   Unknown at Unknown time   • senna (senna) 8.6 MG tablet Take 1 tablet by mouth Daily.   Unknown   • simvastatin (ZOCOR) 20 MG tablet Take 20 mg by mouth Daily.   Unknown at Unknown time   • vitamin D (ERGOCALCIFEROL) 1.25 MG (51433 UT) capsule capsule Take 50,000 Units by mouth Every 14 (Fourteen) Days.   Unknown at Unknown time       Medicines:  Current Facility-Administered Medications   Medication Dose Route Frequency Provider Last Rate Last Admin   • acetaminophen (TYLENOL) tablet 650 mg  650 mg Oral Q4H PRN Thomas Oliver DO   650 mg at 03/09/22 1754    Or   • acetaminophen (TYLENOL) 160 MG/5ML solution 650 mg  650 mg Oral Q4H PRN Thomas Oliver DO        Or   • acetaminophen (TYLENOL) suppository 650 mg  650 mg Rectal Q4H PRN Thomas Oliver DO       • atorvastatin (LIPITOR) tablet 20 mg  20 mg Oral Nightly Thomas Oliver DO   20 mg at 03/14/22 2033   • carvedilol (COREG) tablet 25 mg  25 mg Oral BID With Meals Thomas Oliver DO   25 mg at 03/14/22 1906   • cloNIDine (CATAPRES) tablet 0.1 mg  0.1 mg Oral Q12H Thomas Oliver DO   0.1 mg at 03/14/22 2033   • cloNIDine (CATAPRES-TTS) 0.1 MG/24HR patch 1 patch  1 patch Transdermal Weekly Thomas Oliver DO   1 patch at 03/10/22 2058   • dextrose (D50W) (25 g/50 mL) IV injection 12.5 g  12.5 g Intravenous PRN Thomas Oliver DO   12.5 g at 03/06/22 2212   • dextrose (D50W) (25 g/50 mL) IV injection 25 g  25 g Intravenous Q15 Min PRN Thomas Oliver DO   25 g at 03/08/22 0922   • dextrose (GLUTOSE) oral gel 15 g  15 g Oral Q15 Min PRN Thomas Oliver DO   15 g at 03/14/22 0631   • DULoxetine (CYMBALTA) DR capsule 60 mg  60 mg Oral Daily Thomas Oliver DO   60 mg at 03/14/22 0822   • enoxaparin (LOVENOX) syringe 30 mg  30 mg Subcutaneous Q24H Thomas Oliver, DO   30 mg at 03/14/22 1416   • epoetin yolanda-epbx (RETACRIT) injection 10,000 Units  10,000 Units Subcutaneous Once per day on Mon Wed Fri Eron Blood  MD Scar   10,000 Units at 03/14/22 0836   • famotidine (PEPCID) tablet 20 mg  20 mg Oral Daily Thomas Oliver, DO   20 mg at 03/14/22 0822   • glucagon (human recombinant) (GLUCAGEN DIAGNOSTIC) injection 1 mg  1 mg Intramuscular Q15 Min PRN Thomas Oliver, DO   1 mg at 03/14/22 0654   • hydrALAZINE (APRESOLINE) tablet 75 mg  75 mg Oral Q8H Thomas Oliver, DO   75 mg at 03/15/22 0559   • HYDROcodone-acetaminophen (NORCO)  MG per tablet 1 tablet  1 tablet Oral Q4H PRN Thomas Oliver, DO   1 tablet at 03/15/22 0605   • insulin detemir (LEVEMIR) injection 5 Units  5 Units Subcutaneous Nightly Narinder Madrid MD   5 Units at 03/14/22 2039   • insulin lispro (humaLOG) injection 2-7 Units  2-7 Units Subcutaneous Q4H Thomas Oliver, DO   2 Units at 03/14/22 2315   • ipratropium-albuterol (DUO-NEB) nebulizer solution 3 mL  3 mL Nebulization 4x Daily - RT Thomas Oliver, DO   3 mL at 03/15/22 0621   • isosorbide mononitrate (IMDUR) 24 hr tablet 60 mg  60 mg Oral Q24H Thomas Oliver, DO   60 mg at 03/14/22 0823   • labetalol (NORMODYNE,TRANDATE) injection 10 mg  10 mg Intravenous Q4H PRN Thomas Oliver, DO   10 mg at 03/07/22 0411   • lactulose solution 20 g  20 g Oral Daily Thomas Oliver, DO   20 g at 03/14/22 0825   • levothyroxine (SYNTHROID, LEVOTHROID) tablet 200 mcg  200 mcg Oral Q AM Thomas Oliver, DO   200 mcg at 03/15/22 0559   • lidocaine (LIDODERM) 5 % 2 patch  2 patch Transdermal Q24H Thomas Oliver, DO   2 patch at 03/14/22 0820   • nicotine (NICODERM CQ) 14 MG/24HR patch 1 patch  1 patch Transdermal Q24H Oliver, Thomas C, DO   1 patch at 03/14/22 0825   • NIFEdipine XL (PROCARDIA XL) 24 hr tablet 90 mg  90 mg Oral Q24H Thomas Oliver DO   90 mg at 03/14/22 0823   • ondansetron (ZOFRAN) injection 4 mg  4 mg Intravenous Q6H PRN Thomas Oliver DO       • ondansetron ODT (ZOFRAN-ODT) disintegrating tablet 4 mg  4 mg Oral Q6H PRN Thomas Oliver DO   4 mg at 03/14/22 0823   • senna (SENOKOT)  tablet 1 tablet  1 tablet Oral Daily Thomas Oliver DO   1 tablet at 03/14/22 0825   • sodium chloride 0.45 % infusion  50 mL/hr Intravenous Continuous PRN Thmoas Oliver DO       • sodium chloride 0.9 % flush 10 mL  10 mL Intravenous Q12H Thomas Oliver DO   10 mL at 03/10/22 2058   • sodium chloride 0.9 % flush 10 mL  10 mL Intravenous PRN Thomas Oliver DO       • sodium chloride 0.9 % infusion  50 mL/hr Intravenous Continuous PRN Thomas Oliver DO       • sodium chloride 0.9 % infusion  10 mL/hr Intravenous Continuous PRN Thomas Oliver DO           Past Medical History:  Past Medical History:   Diagnosis Date   • Anxiety    • Arthritis    • Chronic kidney disease     STAGE V RENAL FAILURE   • Depression    • Diabetes mellitus (HCC)    • HTN (hypertension)    • Hypothyroidism    • Obesity    • Tremors of nervous system        Past Surgical History:  Past Surgical History:   Procedure Laterality Date   • ARTERIOVENOUS FISTULA     • BREAST BIOPSY     • CARPAL TUNNEL RELEASE     • CATH LAB PROCEDURE     • CHOLECYSTECTOMY     • TUBAL ABDOMINAL LIGATION         Family History  Family History   Problem Relation Age of Onset   • Cancer Other    • Hypertension Other    • Kidney disease Other    • Heart disease Other    • Diabetes Other    • Diabetes Mother    • Diabetes Maternal Aunt    • Diabetes Maternal Grandmother        Social History  Social History     Socioeconomic History   • Marital status:    Tobacco Use   • Smoking status: Current Every Day Smoker     Packs/day: 0.50   • Smokeless tobacco: Never Used   • Tobacco comment: CUTTING BACK AND TRYING TO STOP SMOKING   Substance and Sexual Activity   • Alcohol use: No   • Drug use: No   • Sexual activity: Defer       Review of Systems:  History obtained from chart review and the patient  General ROS: No fever or chills  Respiratory ROS: No cough, shortness of breath, wheezing  Cardiovascular ROS: No chest pain or palpitations  Gastrointestinal  ROS: No abdominal pain or melena  Genito-Urinary ROS: No dysuria or hematuria  Psych ROS: No anxiety and depression  14 point ROS reviewed with the patient and negative except as noted above and in the HPI unless unable to obtain.    Objective:  Patient Vitals for the past 24 hrs:   BP Temp Temp src Pulse Resp SpO2   03/15/22 0626 -- -- -- 69 -- 99 %   03/15/22 0621 -- -- -- 68 15 97 %   03/15/22 0557 137/66 98 °F (36.7 °C) Oral 72 18 --   03/14/22 2311 146/63 -- -- 68 -- --   03/14/22 2019 134/56 97.7 °F (36.5 °C) Oral 66 18 96 %   03/14/22 1922 -- -- -- 69 18 98 %   03/14/22 1916 -- -- -- 67 18 (!) 87 %   03/14/22 1900 130/54 -- -- 67 -- --   03/14/22 1432 -- -- -- 65 -- 100 %   03/14/22 1426 -- -- -- 64 15 94 %   03/14/22 1350 166/65 98 °F (36.7 °C) Oral 63 16 93 %   03/14/22 1111 154/73 97.7 °F (36.5 °C) Oral 72 16 96 %   03/14/22 1022 -- -- -- 72 -- 99 %   03/14/22 1015 -- -- -- 70 15 98 %     No intake or output data in the 24 hours ending 03/15/22 1007  General: awake/alert   Chest:  clear to auscultation bilaterally without respiratory distress  CVS: regular rate and rhythm  Abdominal: soft, nontender, positive bowel sounds  Extremities: no cyanosis or edema  Skin: warm and dry without rash   Neuro: no focal motor deficits      Labs:  Results from last 7 days   Lab Units 03/15/22  0438 03/14/22  0435 03/13/22  0503   WBC 10*3/mm3 4.40 3.88 3.69   HEMOGLOBIN g/dL 8.0* 8.4* 8.1*   HEMATOCRIT % 27.4* 28.6* 27.3*   PLATELETS 10*3/mm3 203 228 209         Results from last 7 days   Lab Units 03/15/22  0438 03/14/22  0435 03/13/22  0503   SODIUM mmol/L 133* 135* 133*   POTASSIUM mmol/L 5.5* 5.4* 4.4   CHLORIDE mmol/L 96* 97* 99   CO2 mmol/L 23.0 25.0 26.0   BUN mg/dL 50* 35* 23*   CREATININE mg/dL 5.26* 4.28* 3.06*   CALCIUM mg/dL 8.4* 8.8 8.6   EGFR mL/min/1.73 9.2* 11.8* 17.6*   BILIRUBIN mg/dL 0.3  --   --    ALK PHOS U/L 106  --   --    ALT (SGPT) U/L 8  --   --    AST (SGOT) U/L 12  --   --    GLUCOSE mg/dL 88  52* 180*       Radiology:   Imaging Results (Last 72 Hours)     Procedure Component Value Units Date/Time    XR Ribs 2 View Right [[id number]] Collected: 03/12/22 1322     Updated: 03/12/22 1327    Narrative:      EXAMINATION:  XR RIBS 2 VW RIGHT-  3/12/2022 1:14 PM CST     HISTORY: Rib pain after fall.     TECHNIQUE: 2 views and 4 images were obtained of the ribs.     COMPARISON: No comparison study.     FINDINGS:  There are nondisplaced fractures of the right fourth, fifth,  sixth and seventh ribs laterally. There is no evidence of right-sided  pneumothorax.       Impression:      Acute right rib fractures, as described.     This report was finalized on 03/12/2022 13:24 by Dr. Aubrey Lam MD.          Culture:  Blood Culture   Date Value Ref Range Status   03/05/2022 Staphylococcus, coagulase negative (C)  Preliminary   03/05/2022 No growth at 24 hours  Preliminary     Urine Culture   Date Value Ref Range Status   03/05/2022 50,000 CFU/mL Staphylococcus, coagulase negative (A)  Final     Comment:     By laboratory criteria, addititional testing is not indicated and unlikely to produce clinically relevant information.  Coagulase negative staphylococci are common skin contaminants, members of the oral lilly,  and of low virulence; requires clinical correlation.           Assessment   1.  End stage renal disease on HD TTS  2.  Type 2 diabetes   3.  Hypertension  4.  Hyperosmolar hyperglycemia--improved  5.  Metabolic acidosis--improved  6.  Metabolic encephalopathy--resolving  7.  Hyponatremia  8.  Anemia of CKD  9.  Secondary hyperparathyroidism  10.  Hyperkalemia     Plan:  1.  Dialysis today  2.  Monitor labs    GAEL Campo  3/15/2022  10:07 CDT

## 2022-03-15 NOTE — PROGRESS NOTES
[facility] Medicine Services  INPATIENT PROGRESS NOTE    Length of Stay: 10  Date of Admission: 3/5/2022  Primary Care Physician: Yaron Wiggins APRN    Subjective   Chief Complaint: Left leg pain/right rib pain.    HPI   Patient just came back from dialysis today.  Blood pressures much improved.  Afebrile.  Waiting for precertification from insurance.  Patient denies any chest pain.  Patient is currently 1 L of oxygen.    Review of Systems   Constitutional: Positive for activity change, appetite change and fatigue. Negative for chills and fever.   HENT: Negative for hearing loss, nosebleeds, tinnitus and trouble swallowing.    Eyes: Negative for visual disturbance.   Respiratory: Negative for cough, chest tightness, shortness of breath and wheezing.    Cardiovascular: Negative for chest pain, palpitations and leg swelling.   Gastrointestinal: Negative for abdominal distention, abdominal pain, blood in stool, constipation, diarrhea, nausea and vomiting.   Endocrine: Negative for cold intolerance, heat intolerance, polydipsia, polyphagia and polyuria.   Genitourinary: Negative for decreased urine volume, difficulty urinating, dysuria, flank pain, frequency and hematuria.   Musculoskeletal: Positive for arthralgias, gait problem and myalgias. Negative for joint swelling.   Skin: Negative for rash.   Allergic/Immunologic: Negative for immunocompromised state.   Neurological: Positive for weakness. Negative for dizziness, syncope, light-headedness and headaches.   Hematological: Negative for adenopathy. Does not bruise/bleed easily.   Psychiatric/Behavioral: Negative for confusion and sleep disturbance. The patient is not nervous/anxious.            All pertinent negatives and positives are as above. All other systems have been reviewed and are negative unless otherwise stated.     Objective    Temp:  [97.7 °F (36.5 °C)-98 °F (36.7 °C)] 98 °F (36.7 °C)  Heart Rate:  [63-72]  69  Resp:  [15-18] 15  BP: (130-166)/(54-66) 137/66    Intake/Output Summary (Last 24 hours) at 3/15/2022 1209  Last data filed at 3/15/2022 1100  Gross per 24 hour   Intake --   Output 3000 ml   Net -3000 ml     Physical Exam  Vitals and nursing note reviewed.   Constitutional:       Appearance: She is well-developed.   HENT:      Head: Normocephalic.   Eyes:      Conjunctiva/sclera: Conjunctivae normal.      Pupils: Pupils are equal, round, and reactive to light.   Neck:      Vascular: No JVD.   Cardiovascular:      Rate and Rhythm: Normal rate and regular rhythm.      Heart sounds: Normal heart sounds. No murmur heard.    No friction rub. No gallop.   Pulmonary:      Effort: Pulmonary effort is normal. No respiratory distress.      Breath sounds: Normal breath sounds. No wheezing or rales.      Comments: Diminished breath sound bilateral, clear.  Chest:      Chest wall: No tenderness.   Abdominal:      General: Bowel sounds are normal. There is no distension.      Palpations: Abdomen is soft.      Tenderness: There is no abdominal tenderness. There is no guarding or rebound.   Musculoskeletal:         General: No tenderness or deformity.      Cervical back: Neck supple.      Comments: Left femur fracture.    Nondisplaced fracture.   Skin:     General: Skin is warm and dry.      Capillary Refill: Capillary refill takes 2 to 3 seconds.      Findings: No rash.   Neurological:      General: No focal deficit present.      Mental Status: She is alert and oriented to person, place, and time.      Cranial Nerves: No cranial nerve deficit.      Motor: Weakness present. No abnormal muscle tone.      Coordination: Coordination abnormal.      Gait: Gait abnormal.      Deep Tendon Reflexes: Reflexes normal.   Psychiatric:         Behavior: Behavior normal.         Thought Content: Thought content normal.      Results Review:  Lab Results (last 24 hours)     Procedure Component Value Units Date/Time    T4, Free [[id number]]   (Abnormal) Collected: 03/15/22 0438    Specimen: Blood Updated: 03/15/22 1009     Free T4 0.88 ng/dL     Narrative:      Results may be falsely increased if patient taking Biotin.      POC Glucose Once [[id number]]  (Normal) Collected: 03/15/22 0728    Specimen: Blood Updated: 03/15/22 0740     Glucose 89 mg/dL      Comment: : [id number] Adwoa CaraMeter ID: [id number]       TSH [[id number]]  (Abnormal) Collected: 03/15/22 0438    Specimen: Blood Updated: 03/15/22 0640     TSH 41.110 uIU/mL     Comprehensive Metabolic Panel [[id number]]  (Abnormal) Collected: 03/15/22 0438    Specimen: Blood Updated: 03/15/22 0537     Glucose 88 mg/dL      BUN 50 mg/dL      Creatinine 5.26 mg/dL      Sodium 133 mmol/L      Potassium 5.5 mmol/L      Chloride 96 mmol/L      CO2 23.0 mmol/L      Calcium 8.4 mg/dL      Total Protein 5.6 g/dL      Albumin 3.00 g/dL      ALT (SGPT) 8 U/L      AST (SGOT) 12 U/L      Alkaline Phosphatase 106 U/L      Total Bilirubin 0.3 mg/dL      Globulin 2.6 gm/dL      A/G Ratio 1.2 g/dL      BUN/Creatinine Ratio 9.5     Anion Gap 14.0 mmol/L      eGFR 9.2 mL/min/1.73      Comment: <15 Indicative of kidney failure       Narrative:      GFR Normal >60  Chronic Kidney Disease <60  Kidney Failure <15      Lipid Panel [[id number]] Collected: 03/15/22 0438    Specimen: Blood Updated: 03/15/22 0537     Total Cholesterol 102 mg/dL      Triglycerides 35 mg/dL      HDL Cholesterol 48 mg/dL      LDL Cholesterol  44 mg/dL      VLDL Cholesterol 10 mg/dL      LDL/HDL Ratio 0.98    Narrative:      Cholesterol Reference Ranges  (U.S. Department of Health and Human Services ATP III Classifications)    Desirable          <200 mg/dL  Borderline High    200-239 mg/dL  High Risk          >240 mg/dL      Triglyceride Reference Ranges  (U.S. Department of Health and Human Services ATP III Classifications)    Normal           <150 mg/dL  Borderline High  150-199 mg/dL  High             200-499 mg/dL  Very High        >500  mg/dL    HDL Reference Ranges  (U.S. Department of Health and Human Services ATP III Classifications)    Low     <40 mg/dl (major risk factor for CHD)  High    >60 mg/dl ('negative' risk factor for CHD)        LDL Reference Ranges  (U.S. Department of Health and Human Services ATP III Classifications)    Optimal          <100 mg/dL  Near Optimal     100-129 mg/dL  Borderline High  130-159 mg/dL  High             160-189 mg/dL  Very High        >189 mg/dL    CBC & Differential [[id number]]  (Abnormal) Collected: 03/15/22 0438    Specimen: Blood Updated: 03/15/22 0515    Narrative:      The following orders were created for panel order CBC & Differential.  Procedure                               Abnormality         Status                     ---------                               -----------         ------                     CBC Auto Differential[[id number]]        Abnormal            Final result                 Please view results for these tests on the individual orders.    CBC Auto Differential [[id number]]  (Abnormal) Collected: 03/15/22 0438    Specimen: Blood Updated: 03/15/22 0515     WBC 4.40 10*3/mm3      RBC 2.87 10*6/mm3      Hemoglobin 8.0 g/dL      Hematocrit 27.4 %      MCV 95.5 fL      MCH 27.9 pg      MCHC 29.2 g/dL      RDW 18.0 %      RDW-SD 63.0 fl      MPV 9.4 fL      Platelets 203 10*3/mm3      Neutrophil % 62.1 %      Lymphocyte % 22.0 %      Monocyte % 8.4 %      Eosinophil % 5.5 %      Basophil % 1.8 %      Immature Grans % 0.2 %      Neutrophils, Absolute 2.73 10*3/mm3      Lymphocytes, Absolute 0.97 10*3/mm3      Monocytes, Absolute 0.37 10*3/mm3      Eosinophils, Absolute 0.24 10*3/mm3      Basophils, Absolute 0.08 10*3/mm3      Immature Grans, Absolute 0.01 10*3/mm3      nRBC 0.0 /100 WBC     POC Glucose Once [[id number]]  (Normal) Collected: 03/15/22 0406    Specimen: Blood Updated: 03/15/22 0418     Glucose 97 mg/dL      Comment: : REYMUNDO Wheeler VictoriaMeter ID: [id number]        POC Glucose Once [[id number]]  (Abnormal) Collected: 03/14/22 2311    Specimen: Blood Updated: 03/14/22 2326     Glucose 169 mg/dL      Comment: : REYMUNDO Wheeler VictoriaMeter ID: [id number]       POC Glucose Once [[id number]]  (Abnormal) Collected: 03/14/22 2031    Specimen: Blood Updated: 03/14/22 2051     Glucose 229 mg/dL      Comment: : [id number] Wheeler VictoriaMeter ID: [id number]       POC Glucose Once [[id number]]  (Abnormal) Collected: 03/14/22 1657    Specimen: Blood Updated: 03/14/22 1658     Glucose 205 mg/dL      Comment: : [id number] Domingo (Heller) MalloryMeter ID: [id number]              Cultures:  No results found for: BLOODCX, URINECX, WOUNDCX, MRSACX, RESPCX, STOOLCX    Radiology Data:    Imaging Results (Last 24 Hours)     ** No results found for the last 24 hours. **          Allergies   Allergen Reactions   • Penicillins Hives and Shortness Of Breath   • Lamisil [Terbinafine]    • Reglan [Metoclopramide] GI Intolerance   • Stadol [Butorphanol] Nausea And Vomiting       Scheduled meds:   atorvastatin, 20 mg, Oral, Nightly  carvedilol, 25 mg, Oral, BID With Meals  cloNIDine, 0.1 mg, Oral, Q12H  cloNIDine, 1 patch, Transdermal, Weekly  DULoxetine, 60 mg, Oral, Daily  enoxaparin, 30 mg, Subcutaneous, Q24H  epoetin yolanda/yolanda-epbx, 10,000 Units, Subcutaneous, Once per day on Mon Wed Fri  famotidine, 20 mg, Oral, Daily  hydrALAZINE, 75 mg, Oral, Q8H  insulin detemir, 5 Units, Subcutaneous, Nightly  insulin lispro, 2-7 Units, Subcutaneous, Q4H  ipratropium-albuterol, 3 mL, Nebulization, 4x Daily - RT  isosorbide mononitrate, 60 mg, Oral, Q24H  lactulose, 20 g, Oral, Daily  levothyroxine, 200 mcg, Oral, Q AM  lidocaine, 2 patch, Transdermal, Q24H  nicotine, 1 patch, Transdermal, Q24H  NIFEdipine XL, 90 mg, Oral, Q24H  senna, 1 tablet, Oral, Daily  sodium chloride, 10 mL, Intravenous, Q12H        PRN meds:  •  acetaminophen **OR** acetaminophen **OR** acetaminophen  •  dextrose  •   dextrose  •  dextrose  •  glucagon (human recombinant)  •  HYDROcodone-acetaminophen  •  labetalol  •  ondansetron  •  ondansetron ODT  •  sodium chloride  •  sodium chloride  •  sodium chloride  •  sodium chloride    Assessment/Plan       Metabolic encephalopathy    ESRD on hemodialysis (HCC)    Uncontrolled type 2 diabetes mellitus with hyperglycemia ([facility])    Hyperkalemia    Hypertensive emergency    Hypothyroidism    HHNC (hyperglycemic hyperosmolar nonketotic coma) ([facility])    Hyponatremia    Elevated troponin due to ESRD and hypertensive urgency    Acute respiratory failure secondary to an inability to protect her airway    Tobacco abuse    Anemia, chronic disease    Nondisplaced spiral fracture of shaft of left femur, subsequent encounter for closed fracture with delayed healing    Closed fracture of multiple ribs of right side      Plan:  HPI . the patient was admitted on 3/5 by Dr. Prado.  She was found unresponsive.  EMS arrived on the scene and placed an LMA.  She was ultimately intubated in the emergency department.  She had a blood sugar of greater than 900 on arrival and was also severely hypertensive.  She has end-stage renal disease and does dialysis on Tuesday, Thursday, and Saturday.     Respiratory failure.  She required ventilatory support in the emergency department secondary to altered mental status and inability to protect her airway.  She was extubated on Sunday, 3/6.  She has been evaluated by pulmonary.  She remains on room air.     Renal failure/dialysis patient . nephrology consult.  She received dialysis on Saturday in the intensive care unit setting.      Hyperkalemia.   She required a medical treatment for hyperkalemia on the afternoon of 3/6 and also received a dose of Lokelma.  Potassium level 5.5 yesterday so nephrology gave another dose of Lokelma. Dialysis last yesterday.      No sign of infection.  She had 1 bottle positive for Staphylococcus that was coagulase-negative.  Stopped  vancomycin on 3/7. Consider this to be a contaminant.  She has also recently been on ceftriaxone.  This was also stopped to monitor.      Diabetes.  She was initially on an insulin drip.  This was removed on 3/6.  She was reordered long-acting insulin and sliding scale insulin.  However, she has had some problems with hypoglycemia and was recently placed on dextrose fluids, which we stopped 3/9.  Completely stopped long-acting insulin for now, but will resume a lower dose tonight.  Her most recent hemoglobin A1c was 12.4 in January 2022.  Continue Levemir . low sliding scale.  Hemoglobin A1 C 9.7.     Fracture of left distal femur diaphysis extending to the metaphysis with no significant displacement.  The patient had been told to be nonweightbearing at the skilled nursing facility.  She has not seen orthopedics since the fracture was first discovered at [facility] in January. Dr. Hatfield with ortho saw her. He recommended a knee immobilizer when active and during sleep but may unstrap immobilizer while immobile, throughout the day.  Okay for gentle knee range of motion with PT. Nonweightbearing to left lower extremity.     Right rib fractures.  Duo nebs.  Lidocaine patch.  Plain films of right ribs on 3/12 showed multiple rib fractures on the right laterally.  Applying Lidoderm patches.  Make her Norco every 4 hours as needed rather than every 6 hours as needed.  On 1 L of oxygen.     Hypertension/hyperlipidemia/CAD.  Lipitor.  Coreg.  Catapres.  Hydralazine.  Imdur.  Labetalol as needed.    Hyponatremia.  We will follow.    Hyperkalemia.  Lokelma x1.     Anemia.  Erythropoietin.  Hemoglobin stable.  No sign of acute bleed.     Depression.  Cymbalta.     Reflux.  Pepcid prophylaxis.  Zofran as needed.     Constipation.  Lactulose.  Senokot.     Pain.  Norco as needed.     Hypothyroidism.  Synthroid.  Elevated TSH.  Low free T4.     Lovenox DVT prophylaxis.     Chronic smoker.  Discussed patient cutting back and stopping.   Offer nicotine patch.  Nicotine patch.     Nutrition . speech consult.  Regular/thin/consistent carb/low potassium diet.  Nova source renal dietary supplement.     Deconditioning.  PT and OT consult.      Blood skuzehp-zpayssmcd-zpzbqxar stap 1 out of 2 bottles h-probably contamination.  Urine culture-coagulase staph negative-probably contamination . Covid-19-negative.     Discharge Planning: Waiting for rehab placement.  Patient's been accepted [facility], waiting for precertification.    Electronically signed by Narinder Madrid MD, 03/15/22, 12:09 PM CDT.

## 2022-03-15 NOTE — PLAN OF CARE
Problem: Adult Inpatient Plan of Care  Goal: Plan of Care Review  Flowsheets (Taken 3/15/2022 0842)  Progress: improving  Plan of Care Reviewed With: patient  Outcome Evaluation: Pt. able to complete her ue exs to increase her transfers and amb with rwx!   Goal Outcome Evaluation:  Plan of Care Reviewed With: patient        Progress: improving  Outcome Evaluation: Pt. able to complete her ue exs to increase her transfers and amb with rwx!

## 2022-03-15 NOTE — PAYOR COMM NOTE
"Chemo Upton (52 y.o. Female) [id number] cont stay  Please review clinical requesting additional days   Pt remains in hospital   [facility] phone    Fax                Date of Birth   1970    Social Security Number       Address   [address] [place] [address]    Home Phone   [phone]    MRN   [id number]       P[employer]ec[employer]alism   Denominational    Marital Status                               Admission Date   3/5/22    Admission Type   Emergency    Admitting Provider   Narinder Madrid MD    Attending Provider   Narinder Madird MD    Department, Room/Bed   [facility] 3C, 370/1       Discharge Date       Discharge Disposition       Discharge Destination                               Attending Provider: Narinder Madrid MD    Allergies: Penicillins, Lamisil [Terbinafine], Reglan [Metoclopramide], Stadol [Butorphanol]    Isolation: None   Infection: None   Code Status: CPR   Advance Care Planning Activity    Ht: 157.5 cm (62\")   Wt: 60.2 kg (132 lb 12.8 oz)    Admission Cmt: None   Principal Problem: Metabolic encephalopathy [G93.41]                 Active Insurance as of 3/5/2022     Primary Coverage     Payor Plan Insurance Group Employer/Plan Group    [employer] [place] MEDICARE REPLACEMENT [facility] MEDICARE REPLACEMENT      Payor Plan Address Payor Plan Phone Number Payor Plan Fax Number Effective Dates    [address] [address] [phone]  6/1/2021 - None Entered    [facility] [id number]       Subscriber Name Subscriber Birth Date Member ID       CHEMO UPTON [id number]                 Emergency Contacts      (Rel.) Home Phone Work Phone Mobile Phone    Kassandra Paez (Sister) [phone] -- [phone]    Dmitriy Galvez (Friend) -- -- [phone]              Current Facility-Administered Medications   Medication Dose Route Frequency Provider Last Rate Last Admin   • acetaminophen (TYLENOL) tablet 650 mg  650 mg Oral Q4H PRN " Thomas Oliver DO   650 mg at 03/09/22 1754    Or   • acetaminophen (TYLENOL) 160 MG/5ML solution 650 mg  650 mg Oral Q4H PRN Thomas Oliver DO        Or   • acetaminophen (TYLENOL) suppository 650 mg  650 mg Rectal Q4H PRN Thomas Oliver DO       • atorvastatin (LIPITOR) tablet 20 mg  20 mg Oral Nightly Thomas Oliver DO   20 mg at 03/14/22 2033   • carvedilol (COREG) tablet 25 mg  25 mg Oral BID With Meals Thomas Oliver DO   25 mg at 03/15/22 1225   • cloNIDine (CATAPRES) tablet 0.1 mg  0.1 mg Oral Q12H Thomas Oliver DO   0.1 mg at 03/15/22 1226   • cloNIDine (CATAPRES-TTS) 0.1 MG/24HR patch 1 patch  1 patch Transdermal Weekly Thomas Oliver DO   1 patch at 03/10/22 2058   • dextrose (D50W) (25 g/50 mL) IV injection 12.5 g  12.5 g Intravenous PRN Thomas Oliver DO   12.5 g at 03/06/22 2212   • dextrose (D50W) (25 g/50 mL) IV injection 25 g  25 g Intravenous Q15 Min PRN Thomas Oliver DO   25 g at 03/08/22 0922   • dextrose (GLUTOSE) oral gel 15 g  15 g Oral Q15 Min PRN Thomas Oliver DO   15 g at 03/14/22 0631   • DULoxetine (CYMBALTA) DR capsule 60 mg  60 mg Oral Daily Thomas Oliver DO   60 mg at 03/15/22 1226   • enoxaparin (LOVENOX) syringe 30 mg  30 mg Subcutaneous Q24H Thomas Oliver DO   30 mg at 03/15/22 1229   • epoetin yolanda-epbx (RETACRIT) injection 10,000 Units  10,000 Units Subcutaneous Once per day on Mon Wed Fri Eron Blood MD   10,000 Units at 03/14/22 0836   • famotidine (PEPCID) tablet 20 mg  20 mg Oral Daily Thomas Oliver DO   20 mg at 03/15/22 1225   • glucagon (human recombinant) (GLUCAGEN DIAGNOSTIC) injection 1 mg  1 mg Intramuscular Q15 Min PRN Thomas Oliver, DO   1 mg at 03/14/22 0654   • hydrALAZINE (APRESOLINE) tablet 75 mg  75 mg Oral Q8H Thomas Oliver, DO   75 mg at 03/15/22 0559   • HYDROcodone-acetaminophen (NORCO)  MG per tablet 1 tablet  1 tablet Oral Q4H PRN Thomas Oliver, DO   1 tablet at 03/15/22 1229   • insulin detemir  (LEVEMIR) injection 5 Units  5 Units Subcutaneous Nightly Narinder Madrid MD   5 Units at 03/14/22 2039   • insulin lispro (humaLOG) injection 2-7 Units  2-7 Units Subcutaneous Q4H Thomas Oliver, DO   2 Units at 03/14/22 2315   • ipratropium-albuterol (DUO-NEB) nebulizer solution 3 mL  3 mL Nebulization 4x Daily - RT Thomas Olievr, DO   3 mL at 03/15/22 0621   • isosorbide mononitrate (IMDUR) 24 hr tablet 60 mg  60 mg Oral Q24H Thomas Oliver, DO   60 mg at 03/15/22 1225   • labetalol (NORMODYNE,TRANDATE) injection 10 mg  10 mg Intravenous Q4H PRN Thomas Oliver, DO   10 mg at 03/07/22 0411   • lactulose solution 20 g  20 g Oral Daily Thomas Oliver, DO   20 g at 03/15/22 1225   • levothyroxine (SYNTHROID, LEVOTHROID) tablet 200 mcg  200 mcg Oral Q AM Thomas Oliver, DO   200 mcg at 03/15/22 0559   • lidocaine (LIDODERM) 5 % 2 patch  2 patch Transdermal Q24H Thomas Oliver DO   2 patch at 03/15/22 1231   • nicotine (NICODERM CQ) 14 MG/24HR patch 1 patch  1 patch Transdermal Q24H Thomas Oliver, DO   1 patch at 03/15/22 1231   • NIFEdipine XL (PROCARDIA XL) 24 hr tablet 90 mg  90 mg Oral Q24H Thomas Oliver, DO   90 mg at 03/15/22 1226   • ondansetron (ZOFRAN) injection 4 mg  4 mg Intravenous Q6H PRN Thomas Oliver, DO       • ondansetron ODT (ZOFRAN-ODT) disintegrating tablet 4 mg  4 mg Oral Q6H PRN Thomas Oliver, DO   4 mg at 03/14/22 0823   • senna (SENOKOT) tablet 1 tablet  1 tablet Oral Daily Thomas Oliver, DO   1 tablet at 03/15/22 1225   • sodium chloride 0.45 % infusion  50 mL/hr Intravenous Continuous PRN Thomas Oliver, DO       • sodium chloride 0.9 % flush 10 mL  10 mL Intravenous Q12H Thomas Oliver DO   10 mL at 03/10/22 2058   • sodium chloride 0.9 % flush 10 mL  10 mL Intravenous PRN Thomas Oliver,        • sodium chloride 0.9 % infusion  50 mL/hr Intravenous Continuous PRN Thomas Oliver DO       • sodium chloride 0.9 % infusion  10 mL/hr Intravenous Continuous PRN Benito,  Thomas COOLEY DO       • sodium zirconium cyclosilicate (LOKELMA) pack 10 g  10 g Oral Once Narinder Madrid MD            Physician Progress Notes (last 24 hours)      Narinder Madrid MD at 03/15/22 1209              [facility] Medicine Services  INPATIENT PROGRESS NOTE    Length of Stay: 10  Date of Admission: 3/5/2022  Primary Care Physician: Yaron Wiggins APRN    Subjective   Chief Complaint: Left leg pain/right rib pain.    HPI   Patient just came back from dialysis today.  Blood pressures much improved.  Afebrile.  Waiting for precertification from insurance.  Patient denies any chest pain.  Patient is currently 1 L of oxygen.    Review of Systems   Constitutional: Positive for activity change, appetite change and fatigue. Negative for chills and fever.   HENT: Negative for hearing loss, nosebleeds, tinnitus and trouble swallowing.    Eyes: Negative for visual disturbance.   Respiratory: Negative for cough, chest tightness, shortness of breath and wheezing.    Cardiovascular: Negative for chest pain, palpitations and leg swelling.   Gastrointestinal: Negative for abdominal distention, abdominal pain, blood in stool, constipation, diarrhea, nausea and vomiting.   Endocrine: Negative for cold intolerance, heat intolerance, polydipsia, polyphagia and polyuria.   Genitourinary: Negative for decreased urine volume, difficulty urinating, dysuria, flank pain, frequency and hematuria.   Musculoskeletal: Positive for arthralgias, gait problem and myalgias. Negative for joint swelling.   Skin: Negative for rash.   Allergic/Immunologic: Negative for immunocompromised state.   Neurological: Positive for weakness. Negative for dizziness, syncope, light-headedness and headaches.   Hematological: Negative for adenopathy. Does not bruise/bleed easily.   Psychiatric/Behavioral: Negative for confusion and sleep disturbance. The patient is not nervous/anxious.            All pertinent negatives  and positives are as above. All other systems have been reviewed and are negative unless otherwise stated.     Objective    Temp:  [97.7 °F (36.5 °C)-98 °F (36.7 °C)] 98 °F (36.7 °C)  Heart Rate:  [63-72] 69  Resp:  [15-18] 15  BP: (130-166)/(54-66) 137/66    Intake/Output Summary (Last 24 hours) at 3/15/2022 1209  Last data filed at 3/15/2022 1100  Gross per 24 hour   Intake --   Output 3000 ml   Net -3000 ml     Physical Exam  Vitals and nursing note reviewed.   Constitutional:       Appearance: She is well-developed.   HENT:      Head: Normocephalic.   Eyes:      Conjunctiva/sclera: Conjunctivae normal.      Pupils: Pupils are equal, round, and reactive to light.   Neck:      Vascular: No JVD.   Cardiovascular:      Rate and Rhythm: Normal rate and regular rhythm.      Heart sounds: Normal heart sounds. No murmur heard.    No friction rub. No gallop.   Pulmonary:      Effort: Pulmonary effort is normal. No respiratory distress.      Breath sounds: Normal breath sounds. No wheezing or rales.      Comments: Diminished breath sound bilateral, clear.  Chest:      Chest wall: No tenderness.   Abdominal:      General: Bowel sounds are normal. There is no distension.      Palpations: Abdomen is soft.      Tenderness: There is no abdominal tenderness. There is no guarding or rebound.   Musculoskeletal:         General: No tenderness or deformity.      Cervical back: Neck supple.      Comments: Left femur fracture.    Nondisplaced fracture.   Skin:     General: Skin is warm and dry.      Capillary Refill: Capillary refill takes 2 to 3 seconds.      Findings: No rash.   Neurological:      General: No focal deficit present.      Mental Status: She is alert and oriented to person, place, and time.      Cranial Nerves: No cranial nerve deficit.      Motor: Weakness present. No abnormal muscle tone.      Coordination: Coordination abnormal.      Gait: Gait abnormal.      Deep Tendon Reflexes: Reflexes normal.   Psychiatric:          Behavior: Behavior normal.         Thought Content: Thought content normal.      Results Review:  Lab Results (last 24 hours)     Procedure Component Value Units Date/Time    T4, Free [[id number]]  (Abnormal) Collected: 03/15/22 0438    Specimen: Blood Updated: 03/15/22 1009     Free T4 0.88 ng/dL     Narrative:      Results may be falsely increased if patient taking Biotin.      POC Glucose Once [[id number]]  (Normal) Collected: 03/15/22 0728    Specimen: Blood Updated: 03/15/22 0740     Glucose 89 mg/dL      Comment: : [id number] Adwoa CaraMeter ID: [id number]       TSH [[id number]]  (Abnormal) Collected: 03/15/22 0438    Specimen: Blood Updated: 03/15/22 0640     TSH 41.110 uIU/mL     Comprehensive Metabolic Panel [[id number]]  (Abnormal) Collected: 03/15/22 0438    Specimen: Blood Updated: 03/15/22 0537     Glucose 88 mg/dL      BUN 50 mg/dL      Creatinine 5.26 mg/dL      Sodium 133 mmol/L      Potassium 5.5 mmol/L      Chloride 96 mmol/L      CO2 23.0 mmol/L      Calcium 8.4 mg/dL      Total Protein 5.6 g/dL      Albumin 3.00 g/dL      ALT (SGPT) 8 U/L      AST (SGOT) 12 U/L      Alkaline Phosphatase 106 U/L      Total Bilirubin 0.3 mg/dL      Globulin 2.6 gm/dL      A/G Ratio 1.2 g/dL      BUN/Creatinine Ratio 9.5     Anion Gap 14.0 mmol/L      eGFR 9.2 mL/min/1.73      Comment: <15 Indicative of kidney failure       Narrative:      GFR Normal >60  Chronic Kidney Disease <60  Kidney Failure <15      Lipid Panel [[id number]] Collected: 03/15/22 0438    Specimen: Blood Updated: 03/15/22 0537     Total Cholesterol 102 mg/dL      Triglycerides 35 mg/dL      HDL Cholesterol 48 mg/dL      LDL Cholesterol  44 mg/dL      VLDL Cholesterol 10 mg/dL      LDL/HDL Ratio 0.98    Narrative:      Cholesterol Reference Ranges  (U.S. Department of Health and Human Services ATP III Classifications)    Desirable          <200 mg/dL  Borderline High    200-239 mg/dL  High Risk          >240 mg/dL      Triglyceride Reference  Ranges  (U.S. Department of Health and Human Services ATP III Classifications)    Normal           <150 mg/dL  Borderline High  150-199 mg/dL  High             200-499 mg/dL  Very High        >500 mg/dL    HDL Reference Ranges  (U.S. Department of Health and Human Services ATP III Classifications)    Low     <40 mg/dl (major risk factor for CHD)  High    >60 mg/dl ('negative' risk factor for CHD)        LDL Reference Ranges  (U.S. Department of Health and Human Services ATP III Classifications)    Optimal          <100 mg/dL  Near Optimal     100-129 mg/dL  Borderline High  130-159 mg/dL  High             160-189 mg/dL  Very High        >189 mg/dL    CBC & Differential [[id number]]  (Abnormal) Collected: 03/15/22 0438    Specimen: Blood Updated: 03/15/22 0515    Narrative:      The following orders were created for panel order CBC & Differential.  Procedure                               Abnormality         Status                     ---------                               -----------         ------                     CBC Auto Differential[[id number]]        Abnormal            Final result                 Please view results for these tests on the individual orders.    CBC Auto Differential [[id number]]  (Abnormal) Collected: 03/15/22 0438    Specimen: Blood Updated: 03/15/22 0515     WBC 4.40 10*3/mm3      RBC 2.87 10*6/mm3      Hemoglobin 8.0 g/dL      Hematocrit 27.4 %      MCV 95.5 fL      MCH 27.9 pg      MCHC 29.2 g/dL      RDW 18.0 %      RDW-SD 63.0 fl      MPV 9.4 fL      Platelets 203 10*3/mm3      Neutrophil % 62.1 %      Lymphocyte % 22.0 %      Monocyte % 8.4 %      Eosinophil % 5.5 %      Basophil % 1.8 %      Immature Grans % 0.2 %      Neutrophils, Absolute 2.73 10*3/mm3      Lymphocytes, Absolute 0.97 10*3/mm3      Monocytes, Absolute 0.37 10*3/mm3      Eosinophils, Absolute 0.24 10*3/mm3      Basophils, Absolute 0.08 10*3/mm3      Immature Grans, Absolute 0.01 10*3/mm3      nRBC 0.0 /100 WBC     POC  Glucose Once [[id number]]  (Normal) Collected: 03/15/22 0406    Specimen: Blood Updated: 03/15/22 0418     Glucose 97 mg/dL      Comment: : [id number] Wheeler VictoriaMeter ID: [id number]       POC Glucose Once [[id number]]  (Abnormal) Collected: 03/14/22 2311    Specimen: Blood Updated: 03/14/22 2326     Glucose 169 mg/dL      Comment: : [id number] Smith VictoriaMeter ID: [id number]       POC Glucose Once [[id number]]  (Abnormal) Collected: 03/14/22 2031    Specimen: Blood Updated: 03/14/22 2051     Glucose 229 mg/dL      Comment: : [id number] Wheeler VictoriaMeter ID: [id number]       POC Glucose Once [[id number]]  (Abnormal) Collected: 03/14/22 1657    Specimen: Blood Updated: 03/14/22 1658     Glucose 205 mg/dL      Comment: : [id number] Domingo (Heller) MalloryMeter ID: [id number]              Cultures:  No results found for: BLOODCX, URINECX, WOUNDCX, MRSACX, RESPCX, STOOLCX    Radiology Data:    Imaging Results (Last 24 Hours)     ** No results found for the last 24 hours. **          Allergies   Allergen Reactions   • Penicillins Hives and Shortness Of Breath   • Lamisil [Terbinafine]    • Reglan [Metoclopramide] GI Intolerance   • Stadol [Butorphanol] Nausea And Vomiting       Scheduled meds:   atorvastatin, 20 mg, Oral, Nightly  carvedilol, 25 mg, Oral, BID With Meals  cloNIDine, 0.1 mg, Oral, Q12H  cloNIDine, 1 patch, Transdermal, Weekly  DULoxetine, 60 mg, Oral, Daily  enoxaparin, 30 mg, Subcutaneous, Q24H  epoetin yolanda/yolanda-epbx, 10,000 Units, Subcutaneous, Once per day on Mon Wed Fri  famotidine, 20 mg, Oral, Daily  hydrALAZINE, 75 mg, Oral, Q8H  insulin detemir, 5 Units, Subcutaneous, Nightly  insulin lispro, 2-7 Units, Subcutaneous, Q4H  ipratropium-albuterol, 3 mL, Nebulization, 4x Daily - RT  isosorbide mononitrate, 60 mg, Oral, Q24H  lactulose, 20 g, Oral, Daily  levothyroxine, 200 mcg, Oral, Q AM  lidocaine, 2 patch, Transdermal, Q24H  nicotine, 1 patch, Transdermal,  Q24H  NIFEdipine XL, 90 mg, Oral, Q24H  senna, 1 tablet, Oral, Daily  sodium chloride, 10 mL, Intravenous, Q12H        PRN meds:  •  acetaminophen **OR** acetaminophen **OR** acetaminophen  •  dextrose  •  dextrose  •  dextrose  •  glucagon (human recombinant)  •  HYDROcodone-acetaminophen  •  labetalol  •  ondansetron  •  ondansetron ODT  •  sodium chloride  •  sodium chloride  •  sodium chloride  •  sodium chloride    Assessment/Plan       Metabolic encephalopathy    ESRD on hemodialysis ([facility])    Uncontrolled type 2 diabetes mellitus with hyperglycemia ([facility])    Hyperkalemia    Hypertensive emergency    Hypothyroidism    HHNC (hyperglycemic hyperosmolar nonketotic coma) ([facility])    Hyponatremia    Elevated troponin due to ESRD and hypertensive urgency    Acute respiratory failure secondary to an inability to protect her airway    Tobacco abuse    Anemia, chronic disease    Nondisplaced spiral fracture of shaft of left femur, subsequent encounter for closed fracture with delayed healing    Closed fracture of multiple ribs of right side      Plan:  HPI . the patient was admitted on 3/5 by Dr. Prado.  She was found unresponsive.  EMS arrived on the scene and placed an LMA.  She was ultimately intubated in the emergency department.  She had a blood sugar of greater than 900 on arrival and was also severely hypertensive.  She has end-stage renal disease and does dialysis on Tuesday, Thursday, and Saturday.     Respiratory failure.  She required ventilatory support in the emergency department secondary to altered mental status and inability to protect her airway.  She was extubated on Sunday, 3/6.  She has been evaluated by pulmonary.  She remains on room air.     Renal failure/dialysis patient . nephrology consult.  She received dialysis on Saturday in the intensive care unit setting.      Hyperkalemia.   She required a medical treatment for hyperkalemia on the afternoon of 3/6 and also received a dose of Lokelma.   Potassium level 5.5 yesterday so nephrology gave another dose of Lokelma. Dialysis last yesterday.      No sign of infection.  She had 1 bottle positive for Staphylococcus that was coagulase-negative.  Stopped vancomycin on 3/7. Consider this to be a contaminant.  She has also recently been on ceftriaxone.  This was also stopped to monitor.      Diabetes.  She was initially on an insulin drip.  This was removed on 3/6.  She was reordered long-acting insulin and sliding scale insulin.  However, she has had some problems with hypoglycemia and was recently placed on dextrose fluids, which we stopped 3/9.  Completely stopped long-acting insulin for now, but will resume a lower dose tonight.  Her most recent hemoglobin A1c was 12.4 in January 2022.  Continue Levemir . low sliding scale.  Hemoglobin A1 C 9.7.     Fracture of left distal femur diaphysis extending to the metaphysis with no significant displacement.  The patient had been told to be nonweightbearing at the [facility] nursing [facility].  She has not seen orthopedics since the fracture was first discovered at [facility] in January. Dr. Hatfield with ortho saw her. He recommended a knee immobilizer when active and during sleep but may unstrap immobilizer while immobile, throughout the day.  Okay for gentle knee range of motion with PT. Nonweightbearing to left lower extremity.     Right rib fractures.  Duo nebs.  Lidocaine patch.  Plain films of right ribs on 3/12 showed multiple rib fractures on the right laterally.  Applying Lidoderm patches.  Make her Norco every 4 hours as needed rather than every 6 hours as needed.  On 1 L of oxygen.     Hypertension/hyperlipidemia/CAD.  Lipitor.  Coreg.  Catapres.  Hydralazine.  Imdur.  Labetalol as needed.    Hyponatremia.  We will follow.    Hyperkalemia.  Lokelma x1.     Anemia.  Erythropoietin.  Hemoglobin stable.  No sign of acute bleed.     Depression.  Cymbalta.     Reflux.  Pepcid prophylaxis.  Zofran as  needed.     Constipation.  Lactulose.  Senokot.     Pain.  Norco as needed.     Hypothyroidism.  Synthroid.  Elevated TSH.  Low free T4.     Lovenox DVT prophylaxis.     Chronic smoker.  Discussed patient cutting back and stopping.  Offer nicotine patch.  Nicotine patch.     Nutrition . speech consult.  Regular/thin/consistent carb/low potassium diet.  Nova source renal dietary supplement.     Deconditioning.  PT and OT consult.      Blood eitvgkg-rabloitdq-dextkidw stap 1 out of 2 bottles h-probably contamination.  Urine culture-coagulase staph negative-probably contamination . Covid-19-negative.     Discharge Planning: Waiting for rehab placement.  Patient's been accepted [facility], waiting for precertification.    Electronically signed by Narinder Madrid MD, 03/15/22, 12:09 PM CDT.                    Electronically signed by Narinder Madrid MD at 03/15/22 1306     Delmar Salazar APRN at 03/15/22 1007          Nephrology ([facility]) Progress Note      Patient:  Ena Upton  YOB: 1970  Date of Service: 3/15/2022  MRN: [id number]   Acct: [id number]   Primary Care Physician: Yaron Wiggins, APRN  Advance Directive:   Code Status and Medical Interventions:   Ordered at: 03/07/22 0841     Level Of Support Discussed With:    Patient     Code Status (Patient has no pulse and is not breathing):    CPR (Attempt to Resuscitate)     Medical Interventions (Patient has pulse or is breathing):    Full Support     Admit Date: 3/5/2022       Hospital Day: 10  Referring Provider: No Known Provider      Patient personally seen and examined.  Complete chart including Consults, Notes, Operative Reports, Labs, Cardiology, and Radiology studies reviewed as able.        Subjective:  Ena Upton is a 52 y.o. female for whom we were consulted for evaluation and treatment of end stage renal disease. Maintenance hemodialysis on Tuesday Thursday Saturday at [facility]  clinic.  Frequently skips her dialysis treatments. History of type 2 diabetes, hypertension, hypothyroid. Found down at home on 3/05 and brought to ER via EMS. Initial blood glucose over 900 and patient required intubation. Was on Cardene drip due to hypertension. Extubated on 3/06.  Following IV and PO potassium supplementation, she developed hyperkalemia of 6.6. This was managed medically. Sent to medical floor on 3/07. On 3/08 developed significant hypoglycemia. Ultimately required resumption of dextrose IV fluids to maintain blood glucose. Completed HD on 3/08 without issue. Additional HD on 3/10 & 3/12.  Patient has had imaging which has shown right 4-7th rib fractures secondary to a recent fall as well as a previously documented left distal femur fracture.    Today is seen during dialysis, tolerating well. Complaint of left leg pain and right chest wall pain with activity.  No new overnight issues  Dialysis   Pt was seen on RRT. Tolerating well  Modality: Hemodialysis  Access: Arterial Venous Fistula  Location: left upper  QB: 450  QD: 700  UF: 3000    Allergies:  Penicillins, Lamisil [terbinafine], Reglan [metoclopramide], and Stadol [butorphanol]    Home Meds:  Medications Prior to Admission   Medication Sig Dispense Refill Last Dose   • acetaminophen (TYLENOL) 325 MG tablet Take 2 tablets by mouth Every 6 (Six) Hours As Needed for Mild Pain .   Unknown at Unknown time   • albuterol (PROVENTIL HFA;VENTOLIN HFA) 108 (90 BASE) MCG/ACT inhaler Inhale 2 puffs Every 4 (Four) Hours As Needed for wheezing.   Unknown at Unknown time   • cloNIDine (CATAPRES-TTS) 0.1 MG/24HR patch Place 1 patch on the skin as directed by provider 1 (One) Time Per Week.   Unknown at Unknown time   • DULoxetine (CYMBALTA) 60 MG capsule Take 60 mg by mouth daily.   Unknown at Unknown time   • famotidine (PEPCID) 20 MG tablet Take 20 mg by mouth Daily.   Unknown at Unknown time   • hydrALAZINE (APRESOLINE) 50 MG tablet Take 50 mg by mouth  3 (Three) Times a Day.   Unknown at Unknown time   • insulin aspart (novoLOG FLEXPEN) 100 UNIT/ML solution pen-injector sc pen Inject 14 Units under the skin into the appropriate area as directed 3 (Three) Times a Day With Meals.   Unknown at Unknown time   • Insulin Glargine (BASAGLAR KWIKPEN) 100 UNIT/ML injection pen Inject 70 Units under the skin into the appropriate area as directed Every Night.   Unknown at Unknown time   • isosorbide mononitrate (IMDUR) 120 MG 24 hr tablet Take 120 mg by mouth Daily.      • lactulose (CHRONULAC) 10 GM/15ML solution Take 10 g by mouth 3 (Three) Times a Day With Meals. For constipation - HOLD for loose stools      • levothyroxine (SYNTHROID, LEVOTHROID) 200 MCG tablet Take 200 mcg by mouth Daily.   Unknown at Unknown time   • minoxidil (LONITEN) 2.5 MG tablet Take 2.5 mg by mouth Daily.   Unknown at Unknown time   • ondansetron (ZOFRAN) 4 MG tablet Take 4 mg by mouth Every 8 (Eight) Hours As Needed for Nausea or Vomiting.   Unknown at Unknown time   • senna (senna) 8.6 MG tablet Take 1 tablet by mouth Daily.   Unknown   • simvastatin (ZOCOR) 20 MG tablet Take 20 mg by mouth Daily.   Unknown at Unknown time   • vitamin D (ERGOCALCIFEROL) 1.25 MG (95877 UT) capsule capsule Take 50,000 Units by mouth Every 14 (Fourteen) Days.   Unknown at Unknown time       Medicines:  Current Facility-Administered Medications   Medication Dose Route Frequency Provider Last Rate Last Admin   • acetaminophen (TYLENOL) tablet 650 mg  650 mg Oral Q4H PRN Thomas Oliver DO   650 mg at 03/09/22 1754    Or   • acetaminophen (TYLENOL) 160 MG/5ML solution 650 mg  650 mg Oral Q4H PRN Thomas Oliver DO        Or   • acetaminophen (TYLENOL) suppository 650 mg  650 mg Rectal Q4H PRN Thomas Oliver DO       • atorvastatin (LIPITOR) tablet 20 mg  20 mg Oral Nightly Thomas Oliver DO   20 mg at 03/14/22 2033   • carvedilol (COREG) tablet 25 mg  25 mg Oral BID With Meals Thomas Oliver DO   25 mg at  03/14/22 1906   • cloNIDine (CATAPRES) tablet 0.1 mg  0.1 mg Oral Q12H Thomas Oliver DO   0.1 mg at 03/14/22 2033   • cloNIDine (CATAPRES-TTS) 0.1 MG/24HR patch 1 patch  1 patch Transdermal Weekly Thomas Oliver DO   1 patch at 03/10/22 2058   • dextrose (D50W) (25 g/50 mL) IV injection 12.5 g  12.5 g Intravenous PRN Thomas Oliver DO   12.5 g at 03/06/22 2212   • dextrose (D50W) (25 g/50 mL) IV injection 25 g  25 g Intravenous Q15 Min PRN Thomas Oliver DO   25 g at 03/08/22 0922   • dextrose (GLUTOSE) oral gel 15 g  15 g Oral Q15 Min PRN Thomas Oliver DO   15 g at 03/14/22 0631   • DULoxetine (CYMBALTA) DR capsule 60 mg  60 mg Oral Daily Thomas Oliver DO   60 mg at 03/14/22 0822   • enoxaparin (LOVENOX) syringe 30 mg  30 mg Subcutaneous Q24H Thomas Oliver DO   30 mg at 03/14/22 1416   • epoetin yolanda-epbx (RETACRIT) injection 10,000 Units  10,000 Units Subcutaneous Once per day on Mon Wed Fri CaitieEron MD   10,000 Units at 03/14/22 0836   • famotidine (PEPCID) tablet 20 mg  20 mg Oral Daily Thomas Oliver DO   20 mg at 03/14/22 0822   • glucagon (human recombinant) (GLUCAGEN DIAGNOSTIC) injection 1 mg  1 mg Intramuscular Q15 Min PRN Thomas Oliver DO   1 mg at 03/14/22 0654   • hydrALAZINE (APRESOLINE) tablet 75 mg  75 mg Oral Q8H Thomas Oliver, DO   75 mg at 03/15/22 0559   • HYDROcodone-acetaminophen (NORCO)  MG per tablet 1 tablet  1 tablet Oral Q4H PRN Thomas Oliver DO   1 tablet at 03/15/22 0605   • insulin detemir (LEVEMIR) injection 5 Units  5 Units Subcutaneous Nightly Narinder Madrid MD   5 Units at 03/14/22 2039   • insulin lispro (humaLOG) injection 2-7 Units  2-7 Units Subcutaneous Q4H Thomas Oliver DO   2 Units at 03/14/22 2315   • ipratropium-albuterol (DUO-NEB) nebulizer solution 3 mL  3 mL Nebulization 4x Daily - RT Thomas Oliver DO   3 mL at 03/15/22 0621   • isosorbide mononitrate (IMDUR) 24 hr tablet 60 mg  60 mg Oral Q24H Thomas Oliver, DO    60 mg at 03/14/22 0823   • labetalol (NORMODYNE,TRANDATE) injection 10 mg  10 mg Intravenous Q4H PRN Thomas Oliver DO   10 mg at 03/07/22 0411   • lactulose solution 20 g  20 g Oral Daily Thomas Oliver DO   20 g at 03/14/22 0825   • levothyroxine (SYNTHROID, LEVOTHROID) tablet 200 mcg  200 mcg Oral Q AM Thomas Oliver DO   200 mcg at 03/15/22 0559   • lidocaine (LIDODERM) 5 % 2 patch  2 patch Transdermal Q24H Thomas Oliver DO   2 patch at 03/14/22 0820   • nicotine (NICODERM CQ) 14 MG/24HR patch 1 patch  1 patch Transdermal Q24H Thomas Oliver DO   1 patch at 03/14/22 0825   • NIFEdipine XL (PROCARDIA XL) 24 hr tablet 90 mg  90 mg Oral Q24H Thomas Oliver DO   90 mg at 03/14/22 0823   • ondansetron (ZOFRAN) injection 4 mg  4 mg Intravenous Q6H PRN Thomas Oliver DO       • ondansetron ODT (ZOFRAN-ODT) disintegrating tablet 4 mg  4 mg Oral Q6H PRN Thomas Oliver DO   4 mg at 03/14/22 0823   • senna (SENOKOT) tablet 1 tablet  1 tablet Oral Daily Thomas Oliver DO   1 tablet at 03/14/22 0825   • sodium chloride 0.45 % infusion  50 mL/hr Intravenous Continuous PRN Thomas Oliver DO       • sodium chloride 0.9 % flush 10 mL  10 mL Intravenous Q12H Thomas Oliver DO   10 mL at 03/10/22 2058   • sodium chloride 0.9 % flush 10 mL  10 mL Intravenous PRN Thomas Oliver DO       • sodium chloride 0.9 % infusion  50 mL/hr Intravenous Continuous PRN Thomas Oliver DO       • sodium chloride 0.9 % infusion  10 mL/hr Intravenous Continuous PRN Thomas Oliver, DO           Past Medical History:  Past Medical History:   Diagnosis Date   • Anxiety    • Arthritis    • Chronic kidney disease     STAGE V RENAL FAILURE   • Depression    • Diabetes mellitus (HCC)    • HTN (hypertension)    • Hypothyroidism    • Obesity    • Tremors of nervous system        Past Surgical History:  Past Surgical History:   Procedure Laterality Date   • ARTERIOVENOUS FISTULA     • BREAST BIOPSY     • CARPAL TUNNEL RELEASE     •  CATH LAB PROCEDURE     • CHOLECYSTECTOMY     • TUBAL ABDOMINAL LIGATION         Family History  Family History   Problem Relation Age of Onset   • Cancer Other    • Hypertension Other    • Kidney disease Other    • Heart disease Other    • Diabetes Other    • Diabetes Mother    • Diabetes Maternal Aunt    • Diabetes Maternal Grandmother        Social History  Social History     Socioeconomic History   • Marital status:    Tobacco Use   • Smoking status: Current Every Day Smoker     Packs/day: 0.50   • Smokeless tobacco: Never Used   • Tobacco comment: CUTTING BACK AND TRYING TO STOP SMOKING   Substance and Sexual Activity   • Alcohol use: No   • Drug use: No   • Sexual activity: Defer       Review of Systems:  History obtained from chart review and the patient  General ROS: No fever or chills  Respiratory ROS: No cough, shortness of breath, wheezing  Cardiovascular ROS: No chest pain or palpitations  Gastrointestinal ROS: No abdominal pain or melena  Genito-Urinary ROS: No dysuria or hematuria  Psych ROS: No anxiety and depression  14 point ROS reviewed with the patient and negative except as noted above and in the HPI unless unable to obtain.    Objective:  Patient Vitals for the past 24 hrs:   BP Temp Temp src Pulse Resp SpO2   03/15/22 0626 -- -- -- 69 -- 99 %   03/15/22 0621 -- -- -- 68 15 97 %   03/15/22 0557 137/66 98 °F (36.7 °C) Oral 72 18 --   03/14/22 2311 146/63 -- -- 68 -- --   03/14/22 2019 134/56 97.7 °F (36.5 °C) Oral 66 18 96 %   03/14/22 1922 -- -- -- 69 18 98 %   03/14/22 1916 -- -- -- 67 18 (!) 87 %   03/14/22 1900 130/54 -- -- 67 -- --   03/14/22 1432 -- -- -- 65 -- 100 %   03/14/22 1426 -- -- -- 64 15 94 %   03/14/22 1350 166/65 98 °F (36.7 °C) Oral 63 16 93 %   03/14/22 1111 154/73 97.7 °F (36.5 °C) Oral 72 16 96 %   03/14/22 1022 -- -- -- 72 -- 99 %   03/14/22 1015 -- -- -- 70 15 98 %     No intake or output data in the 24 hours ending 03/15/22 1007  General: awake/alert   Chest:   clear to auscultation bilaterally without respiratory distress  CVS: regular rate and rhythm  Abdominal: soft, nontender, positive bowel sounds  Extremities: no cyanosis or edema  Skin: warm and dry without rash   Neuro: no focal motor deficits      Labs:  Results from last 7 days   Lab Units 03/15/22  0438 03/14/22  0435 03/13/22  0503   WBC 10*3/mm3 4.40 3.88 3.69   HEMOGLOBIN g/dL 8.0* 8.4* 8.1*   HEMATOCRIT % 27.4* 28.6* 27.3*   PLATELETS 10*3/mm3 203 228 209         Results from last 7 days   Lab Units 03/15/22  0438 03/14/22  0435 03/13/22  0503   SODIUM mmol/L 133* 135* 133*   POTASSIUM mmol/L 5.5* 5.4* 4.4   CHLORIDE mmol/L 96* 97* 99   CO2 mmol/L 23.0 25.0 26.0   BUN mg/dL 50* 35* 23*   CREATININE mg/dL 5.26* 4.28* 3.06*   CALCIUM mg/dL 8.4* 8.8 8.6   EGFR mL/min/1.73 9.2* 11.8* 17.6*   BILIRUBIN mg/dL 0.3  --   --    ALK PHOS U/L 106  --   --    ALT (SGPT) U/L 8  --   --    AST (SGOT) U/L 12  --   --    GLUCOSE mg/dL 88 52* 180*       Radiology:   Imaging Results (Last 72 Hours)     Procedure Component Value Units Date/Time    XR Ribs 2 View Right [[id number]] Collected: 03/12/22 1322     Updated: 03/12/22 1327    Narrative:      EXAMINATION:  XR RIBS 2 VW RIGHT-  3/12/2022 1:14 PM CST     HISTORY: Rib pain after fall.     TECHNIQUE: 2 views and 4 images were obtained of the ribs.     COMPARISON: No comparison study.     FINDINGS:  There are nondisplaced fractures of the right fourth, fifth,  sixth and seventh ribs laterally. There is no evidence of right-sided  pneumothorax.       Impression:      Acute right rib fractures, as described.     This report was finalized on 03/12/2022 13:24 by Dr. Aubrey Lam MD.          Culture:  Blood Culture   Date Value Ref Range Status   03/05/2022 Staphylococcus, coagulase negative (C)  Preliminary   03/05/2022 No growth at 24 hours  Preliminary     Urine Culture   Date Value Ref Range Status   03/05/2022 50,000 CFU/mL Staphylococcus, coagulase negative (A)  Final      Comment:     By laboratory criteria, addititional testing is not indicated and unlikely to produce clinically relevant information.  Coagulase negative staphylococci are common skin contaminants, members of the oral lilly,  and of low virulence; requires clinical correlation.           Assessment   1.  End stage renal disease on HD TTS  2.  Type 2 diabetes   3.  Hypertension  4.  Hyperosmolar hyperglycemia--improved  5.  Metabolic acidosis--improved  6.  Metabolic encephalopathy--resolving  7.  Hyponatremia  8.  Anemia of CKD  9.  Secondary hyperparathyroidism  10.  Hyperkalemia     Plan:  1.  Dialysis today  2.  Monitor labs    GAEL Campo  3/15/2022  10:07 CDT    Electronically signed by Delmar Salazar APRN at 03/15/22 1010

## 2022-03-15 NOTE — CASE MANAGEMENT/SOCIAL WORK
Continued Stay Note   [place]     Patient Name: Ena Upton  MRN: [id number]  Today's Date: 3/15/2022    Admit Date: 3/5/2022     Discharge Plan     Row Name 03/15/22 1519       Plan    Plan [facility] pending precert    Patient/Family in Agreement with Plan yes    Plan Comments Precert still pending for [facility]. Hopefully will be approved soon.               Discharge Codes    No documentation.                     GREGORIO Angeles

## 2022-03-16 NOTE — PLAN OF CARE
Goal Outcome Evaluation:  Plan of Care Reviewed With: patient        Progress: no change  Outcome Evaluation: Pt agreed to PT and needed min 1 to stand.Pt was SBA for sitting balance and attempted to stand with RW but was unable due to weakness. Pt is motivated and wants to go to rehab at SNF.

## 2022-03-16 NOTE — THERAPY TREATMENT NOTE
Acute Care - Physical Therapy Treatment Note  [facility]     Patient Name: Ena Upton  : 1970  MRN: [id number]  Today's Date: 3/16/2022      Visit Dx:     ICD-10-CM ICD-9-CM   1. Altered mental status, unspecified altered mental status type  R41.82 780.97   2. Dysphagia, unspecified type  R13.10 787.20   3. Cognitive and behavioral changes  R41.89 799.59    R46.89 312.9   4. Gait abnormality  R26.9 781.2   5. Decreased activities of daily living (ADL)  Z78.9 V49.89     Patient Active Problem List   Diagnosis   • CKD stage 4 due to type 2 diabetes mellitus ([facility])   • Non-ketotic hyperosmolar coma ([facility])   • HTN (hypertension)   • Intractable nausea and vomiting   • Constipation   • Gastroparesis due to DM ([facility])   • Hyperglycemia   • Seizure ([facility])   • ESRD on hemodialysis ([facility])   • Sacral insufficiency fracture   • Hypertensive urgency   • Uncontrolled type 2 diabetes mellitus with hyperglycemia ([facility])   • Hyperkalemia   • Metabolic encephalopathy   • Hypertensive emergency   • Acute pulmonary edema ([facility])   • Hypothyroidism   • HHNC (hyperglycemic hyperosmolar nonketotic coma) ([facility])   • Uremic encephalopathy syndrome   • Hyponatremia   • Elevated troponin due to ESRD and hypertensive urgency   • Acute respiratory failure secondary to an inability to protect her airway   • Tobacco abuse   • Anemia, chronic disease   • Nondisplaced spiral fracture of shaft of left femur, subsequent encounter for closed fracture with delayed healing   • Closed fracture of multiple ribs of right side     Past Medical History:   Diagnosis Date   • Anxiety    • Arthritis    • Chronic kidney disease     STAGE V RENAL FAILURE   • Depression    • Diabetes mellitus (HCC)    • HTN (hypertension)    • Hypothyroidism    • Obesity    • Tremors of nervous system      Past Surgical History:   Procedure Laterality Date   • ARTERIOVENOUS FISTULA     • BREAST BIOPSY     • CARPAL TUNNEL RELEASE     • CATH LAB PROCEDURE     • CHOLECYSTECTOMY      • TUBAL ABDOMINAL LIGATION       PT Assessment (last 12 hours)     PT Evaluation and Treatment     Row Name 03/16/22 1345          Physical Therapy Time and Intention    Subjective Information complains of;weakness;fatigue  -AE     Document Type therapy note (daily note)  -AE     Mode of Treatment physical therapy  -AE     Row Name 03/16/22 1345          General Information    Existing Precautions/Restrictions fall;non-weight bearing  L LE knee immobilizer on when OOB  -AE     Row Name 03/16/22 1345          Bed Mobility    Supine-Sit [place] (Bed Mobility) minimum assist (75% patient effort);verbal cues  -AE     Row Name 03/16/22 1345          Transfers    Sit-Stand [place] (Transfers) --  Pt attempted to stand but unable  -AE     Row Name 03/16/22 1345          Balance    Static Sitting Balance independent  -AE     Row Name 03/16/22 1345          Hip (Therapeutic Exercise)    Hip (Therapeutic Exercise) AROM (active range of motion)  -AE     Hip AROM (Therapeutic Exercise) aBduction;aDduction;20 repititions  -AE     Row Name 03/16/22 1345          Knee (Therapeutic Exercise)    Knee (Therapeutic Exercise) AROM (active range of motion)  -AE     Knee AROM (Therapeutic Exercise) right;LAQ (long arc quad);flexion  -AE     Row Name 03/16/22 1345          Ankle (Therapeutic Exercise)    Ankle (Therapeutic Exercise) AROM (active range of motion)  -AE     Ankle AROM (Therapeutic Exercise) bilateral;plantarflexion;dorsiflexion;20 repititions  -AE     Row Name             Wound 03/05/22 2000 Right coccyx Pressure Injury    Wound - Properties Group Placement Date: 03/05/22 -KS Placement Time: 2000  -KS Present on Hospital Admission: Y  -KS Side: Right  -KS Location: coccyx  -KS Primary Wound Type: Pressure inj  -KS     Retired Wound - Properties Group Placement Date: 03/05/22 -KS Placement Time: 2000  -KS Present on Hospital Admission: Y  -KS Side: Right  -KS Location: coccyx  -KS Primary Wound Type: Pressure  inj  -KS     Retired Wound - Properties Group Date first assessed: 03/05/22  -KS Time first assessed: 2000 -KS Present on Hospital Admission: Y  -KS Side: Right  -KS Location: coccyx  -KS Primary Wound Type: Pressure inj  -KS     Row Name             Wound 03/15/22 0618 Bilateral perineum Blisters    Wound - Properties Group Placement Date: 03/15/22  -VS Placement Time: 0618  -VS Present on Hospital Admission: Y  -VS Side: Bilateral  -VS Location: perineum  -VS Primary Wound Type: Blisters  -VS     Retired Wound - Properties Group Placement Date: 03/15/22  -VS Placement Time: 0618  -VS Present on Hospital Admission: Y  -VS Side: Bilateral  -VS Location: perineum  -VS Primary Wound Type: Blisters  -VS     Retired Wound - Properties Group Date first assessed: 03/15/22  -VS Time first assessed: 0618  -VS Present on Hospital Admission: Y  -VS Side: Bilateral  -VS Location: perineum  -VS Primary Wound Type: Blisters  -VS     Row Name             Wound 03/15/22 0620 Bilateral groin Blisters    Wound - Properties Group Placement Date: 03/15/22  -VS Placement Time: 0620  -VS Present on Hospital Admission: Y  -VS Side: Bilateral  -VS Location: groin  -VS Primary Wound Type: Blisters  -VS     Retired Wound - Properties Group Placement Date: 03/15/22  -VS Placement Time: 0620  -VS Present on Hospital Admission: Y  -VS Side: Bilateral  -VS Location: groin  -VS Primary Wound Type: Blisters  -VS     Retired Wound - Properties Group Date first assessed: 03/15/22  -VS Time first assessed: 0620  -VS Present on Hospital Admission: Y  -VS Side: Bilateral  -VS Location: groin  -VS Primary Wound Type: Blisters  -VS     Row Name 03/16/22 1345          Positioning and Restraints    Pre-Treatment Position in bed  -AE     Post Treatment Position bed  -AE     In Bed fowlers;call light within reach  -AE           User Key  (r) = Recorded By, (t) = Taken By, (c) = Cosigned By    Initials Name Provider Type    Staci Verdin, PTA Physical  Therapy Assistant    Monica Adams, RN Registered Nurse    Misty Miles RN Registered Nurse                Physical Therapy Education                 Title: PT OT SLP Therapies (In Progress)     Topic: Physical Therapy (In Progress)     Point: Mobility training (Done)     Learning Progress Summary           Patient Acceptance, E,TB,D, BULMARO,SELWYN,NR by  at 3/11/2022 0933    Comment: Education re: purpose of PT/importance of activity, for safety/falls prevention, for improved tech w/ tfers sit to/from stand, moving bed to chair, for proper use of rwx, NWB L LE, wearing schedule of brace, proper positioning of brace & mgmt of brace                   Point: Home exercise program (Done)     Learning Progress Summary           Patient Acceptance, E,TB,D, BULMARO,DU,NR by  at 3/11/2022 0933    Comment: Education re: purpose of PT/importance of activity, for safety/falls prevention, for improved tech w/ tfers sit to/from stand, moving bed to chair, for proper use of rwx, NWB L LE, wearing schedule of brace, proper positioning of brace & mgmt of brace                   Point: Body mechanics (Not Started)     Learner Progress:  Not documented in this visit.          Point: Precautions (Done)     Learning Progress Summary           Patient Acceptance, E,TB,D, BULMARO,DU,NR by  at 3/11/2022 0933    Comment: Education re: purpose of PT/importance of activity, for safety/falls prevention, for improved tech w/ tfers sit to/from stand, moving bed to chair, for proper use of rwx, NWB L LE, wearing schedule of brace, proper positioning of brace & mgmt of brace                               User Key     Initials Effective Dates Name Provider Type Discipline     08/02/18 -  Amairani Nieves, PT Physical Therapist PT              PT Recommendation and Plan     Plan of Care Reviewed With: patient  Progress: no change  Outcome Evaluation: Pt agreed to PT and needed min 1 to stand.Pt was SBA for sitting balance and attempted to stand  with RW but was unable due to weakness. Pt is motivated and wants to go to rehab at [facility].   Outcome Measures     Row Name 03/16/22 0823 03/15/22 0724          How much help from another is currently needed...    Putting on and taking off regular lower body clothing? 2  -CJ 2  -CJ     Bathing (including washing, rinsing, and drying) 2  -CJ 2  -CJ     Toileting (which includes using toilet bed pan or urinal) 2  -CJ 2  -CJ     Putting on and taking off regular upper body clothing 3  -CJ 3  -CJ     Taking care of personal grooming (such as brushing teeth) 4  -CJ 4  -CJ     Eating meals 4  -CJ 4  -CJ     AM-PAC 6 Clicks Score (OT) 17  -CJ 17  -CJ            Functional Assessment    Outcome Measure Options AM-PAC 6 Clicks Daily Activity (OT)  -CJ AM-PAC 6 Clicks Daily Activity (OT)  -CJ           User Key  (r) = Recorded By, (t) = Taken By, (c) = Cosigned By    Initials Name Provider Type    CJ Zack Tafoya COTA Occupational Therapy Assistant                 Time Calculation:    PT Charges     Row Name 03/16/22 1606             Time Calculation    Start Time 1345  -AE      Stop Time 1408  -AE      Time Calculation (min) 23 min  -AE      PT Received On 03/16/22  -AE      PT Goal Re-Cert Due Date 03/21/22  -AE              Time Calculation- PT    Total Timed Code Minutes- PT 23 minute(s)  -AE              Timed Charges    60901 - PT Therapeutic Activity Minutes 23  -AE              Total Minutes    Timed Charges Total Minutes 23  -AE       Total Minutes 23  -AE            User Key  (r) = Recorded By, (t) = Taken By, (c) = Cosigned By    Initials Name Provider Type    AE Staci Poon PTA Physical Therapy Assistant              Therapy Charges for Today     Code Description Service Date Service Provider Modifiers Qty    [id number] HC PT THERAPEUTIC ACT EA 15 MIN 3/16/2022 Staci Poon PTA GP 2          PT G-Codes  Outcome Measure Options: AM-PAC 6 Clicks Daily Activity (OT)  AM-PAC 6 Clicks Score (PT):  19  AM-PAC 6 Clicks Score (OT): 17    Staci Poon, PTA  3/16/2022

## 2022-03-16 NOTE — THERAPY TREATMENT NOTE
Acute Care - Occupational Therapy Treatment Note  [facility]     Patient Name: Ena Upton  : 1970  MRN: [id number]  Today's Date: 3/16/2022             Admit Date: 3/5/2022       ICD-10-CM ICD-9-CM   1. Altered mental status, unspecified altered mental status type  R41.82 780.97   2. Dysphagia, unspecified type  R13.10 787.20   3. Cognitive and behavioral changes  R41.89 799.59    R46.89 312.9   4. Gait abnormality  R26.9 781.2   5. 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)   • Non-ketotic hyperosmolar coma ([facility])   • HTN (hypertension)   • Intractable nausea and vomiting   • Constipation   • Gastroparesis due to DM ([facility])   • Hyperglycemia   • Seizure ([facility])   • ESRD on hemodialysis ([facility])   • Sacral insufficiency fracture   • Hypertensive urgency   • Uncontrolled type 2 diabetes mellitus with hyperglycemia ([facility])   • Hyperkalemia   • Metabolic encephalopathy   • Hypertensive emergency   • Acute pulmonary edema ([facility])   • Hypothyroidism   • HHNC (hyperglycemic hyperosmolar nonketotic coma) ([facility])   • Uremic encephalopathy syndrome   • Hyponatremia   • Elevated troponin due to ESRD and hypertensive urgency   • Acute respiratory failure secondary to an inability to protect her airway   • Tobacco abuse   • Anemia, chronic disease   • Nondisplaced spiral fracture of shaft of left femur, subsequent encounter for closed fracture with delayed healing   • Closed fracture of multiple ribs of right side     Past Medical History:   Diagnosis Date   • Anxiety    • Arthritis    • Chronic kidney disease     STAGE V RENAL FAILURE   • Depression    • Diabetes mellitus (HCC)    • HTN (hypertension)    • Hypothyroidism    • Obesity    • Tremors of nervous system      Past Surgical History:   Procedure Laterality Date   • ARTERIOVENOUS FISTULA     • BREAST BIOPSY     • CARPAL TUNNEL RELEASE     • CATH LAB PROCEDURE      • CHOLECYSTECTOMY     • TUBAL ABDOMINAL LIGATION           OT ASSESSMENT FLOWSHEET (last 12 hours)     OT Evaluation and Treatment     Row Name 03/16/22 0823                   OT Time and Intention    Subjective Information complains of;weakness;fatigue  -        Document Type therapy note (daily note)  -        Mode of Treatment occupational therapy  -        Patient Effort good  -                  General Information    Existing Precautions/Restrictions fall;non-weight bearing  LLE!  -                  Pain Assessment    Pretreatment Pain Rating 0/10 - no pain  -        Posttreatment Pain Rating 0/10 - no pain  -        Pain Intervention(s) Rest  -                  Bed Mobility    Comment, (Bed Mobility) fowlers in bed!  -                  Motor Skills    Therapeutic Exercise shoulder;elbow/forearm  -                  Shoulder (Therapeutic Exercise)    Shoulder (Therapeutic Exercise) AROM (active range of motion);strengthening exercise  -        Shoulder AROM (Therapeutic Exercise) bilateral;flexion;extension;sitting;15 repititions;2 sets  -        Shoulder Strengthening (Therapeutic Exercise) bilateral;flexion;extension;sitting;3 lb free weight;2 lb free weight;15 repititions;2 sets  -                  Elbow/Forearm (Therapeutic Exercise)    Elbow/Forearm (Therapeutic Exercise) AROM (active range of motion);strengthening exercise  -        Elbow/Forearm AROM (Therapeutic Exercise) bilateral;flexion;extension;sitting;15 repititions;2 sets  -        Elbow/Forearm Strengthening (Therapeutic Exercise) bilateral;flexion;extension;sitting;2 lb free weight;3 lb free weight;15 repititions;2 sets  -                  Wound 03/05/22 2000 Right coccyx Pressure Injury    Wound - Properties Group Placement Date: 03/05/22  -KS Placement Time: 2000  -KS Present on Hospital Admission: Y  -KS Side: Right  -KS Location: coccyx  -KS Primary Wound Type: Pressure inj  -KS        Retired Wound -  Properties Group Placement Date: 03/05/22  -KS Placement Time: 2000 -KS Present on Hospital Admission: Y  -KS Side: Right  -KS Location: coccyx  -KS Primary Wound Type: Pressure inj  -KS        Retired Wound - Properties Group Date first assessed: 03/05/22  -KS Time first assessed: 2000 -KS Present on Hospital Admission: Y  -KS Side: Right  -KS Location: coccyx  -KS Primary Wound Type: Pressure inj  -KS                  Wound 03/15/22 0618 Bilateral perineum Blisters    Wound - Properties Group Placement Date: 03/15/22  -VS Placement Time: 0618  -VS Present on Hospital Admission: Y  -VS Side: Bilateral  -VS Location: perineum  -VS Primary Wound Type: Blisters  -VS        Retired Wound - Properties Group Placement Date: 03/15/22  -VS Placement Time: 0618  -VS Present on Hospital Admission: Y  -VS Side: Bilateral  -VS Location: perineum  -VS Primary Wound Type: Blisters  -VS        Retired Wound - Properties Group Date first assessed: 03/15/22  -VS Time first assessed: 0618  -VS Present on Hospital Admission: Y  -VS Side: Bilateral  -VS Location: perineum  -VS Primary Wound Type: Blisters  -VS                  Wound 03/15/22 0620 Bilateral groin Blisters    Wound - Properties Group Placement Date: 03/15/22  -VS Placement Time: 0620  -VS Present on Hospital Admission: Y  -VS Side: Bilateral  -VS Location: groin  -VS Primary Wound Type: Blisters  -VS        Retired Wound - Properties Group Placement Date: 03/15/22  -VS Placement Time: 0620  -VS Present on Hospital Admission: Y  -VS Side: Bilateral  -VS Location: groin  -VS Primary Wound Type: Blisters  -VS        Retired Wound - Properties Group Date first assessed: 03/15/22  -VS Time first assessed: 0620  -VS Present on Hospital Admission: Y  -VS Side: Bilateral  -VS Location: groin  -VS Primary Wound Type: Blisters  -VS                  Positioning and Restraints    Pre-Treatment Position in bed  -CJ        Post Treatment Position bed  -CJ        In Bed fowlers;call  light within reach;encouraged to call for assist;side rails up x2  -                  Progress Summary (OT)    Progress Toward Functional Goals (OT) progress toward functional goals is good  -        Barriers to Overall Progress (OT) Fall/Nwb LLE!  -        Impairments Still Limiting Function (OT) continue with ot poc!  -              User Key  (r) = Recorded By, (t) = Taken By, (c) = Cosigned By    Initials Name Effective Dates    CJ Zack Tafoya COTA 06/16/21 -     Monica Adams RN 06/16/21 -     VS Misty Wheeler RN 07/07/21 -                  Occupational Therapy Education                 Title: PT OT SLP Therapies (In Progress)     Topic: Occupational Therapy (Done)     Point: ADL training (Done)     Description:   Instruct learner(s) on proper safety adaptation and remediation techniques during self care or transfers.   Instruct in proper use of assistive devices.              Learning Progress Summary           Patient Acceptance, E, VU,NR by  at 3/11/2022 0948                   Point: Home exercise program (Done)     Description:   Instruct learner(s) on appropriate technique for monitoring, assisting and/or progressing therapeutic exercises/activities.              Learning Progress Summary           Patient Acceptance, E,TB, VU,DU,NR by  at 3/16/2022 0823    Comment: Pt. performed ue exs to increase her act. xochilt!    Acceptance, E,TB, VU,DU,NR by  at 3/15/2022 0724    Comment: Pt. completed ue exs to increase her transfers!    Acceptance, E, VU,NR by  at 3/11/2022 0948                   Point: Precautions (Done)     Description:   Instruct learner(s) on prescribed precautions during self-care and functional transfers.              Learning Progress Summary           Patient Acceptance, E,TB, VU,DU,NR by  at 3/16/2022 0823    Comment: Pt. performed ue exs to increase her act. xochilt!    Acceptance, E,TB, VU,DU,NR by  at 3/15/2022 0724    Comment: Pt. completed ue exs to  increase her transfers!    Acceptance, E, VU,NR by  at 3/11/2022 0948                               User Key     Initials Effective Dates Name Provider Type [facility] 06/16/21 -  Zack Tafoya COTA Occupational Therapy Assistant OT     12/28/21 -  Dana Woo, OT Student OT Student OT                  OT Recommendation and Plan     Progress Toward Functional Goals (OT): progress toward functional goals is good  Plan of Care Review  Plan of Care Reviewed With: patient  Progress: improving  Outcome Evaluation: Pt. completes ue exs to increase her transfers and bed mobility!  Plan of Care Reviewed With: patient  Outcome Evaluation: Pt. completes ue exs to increase her transfers and bed mobility!     Outcome Measures     Row Name 03/16/22 0823 03/15/22 0724          How much help from another is currently needed...    Putting on and taking off regular lower body clothing? 2  -CJ 2  -CJ     Bathing (including washing, rinsing, and drying) 2  -CJ 2  -CJ     Toileting (which includes using toilet bed pan or urinal) 2  -CJ 2  -CJ     Putting on and taking off regular upper body clothing 3  -CJ 3  -CJ     Taking care of personal grooming (such as brushing teeth) 4  -CJ 4  -CJ     Eating meals 4  -CJ 4  -CJ     AM-PAC 6 Clicks Score (OT) 17  - 17  -            Functional Assessment    Outcome Measure Options AM-PAC 6 Clicks Daily Activity (OT)  - AM-PAC 6 Clicks Daily Activity (OT)  -           User Key  (r) = Recorded By, (t) = Taken By, (c) = Cosigned By    Initials Name Provider Type     Zack Tafoya COTA Occupational Therapy Assistant                Time Calculation:    Time Calculation- OT     Row Name 03/16/22 0823             Time Calculation- OT    OT Start Time 0823  -      OT Stop Time 0851  -      OT Time Calculation (min) 28 min  -      Total Timed Code Minutes- OT 28 minute(s)  -      OT Received On 03/16/22  -              Timed Charges    23428 - OT Therapeutic  Exercise Minutes 28  -CJ              Total Minutes    Timed Charges Total Minutes 28  -CJ       Total Minutes 28  -CJ            User Key  (r) = Recorded By, (t) = Taken By, (c) = Cosigned By    Initials Name Provider Type    Zack Whipple COTA Occupational Therapy Assistant              Therapy Charges for Today     Code Description Service Date Service Provider Modifiers Qty    66526099775 HC OT THER PROC EA 15 MIN 3/15/2022 Zack Tafoya COTA GO 2    74302066006 HC OT THER PROC EA 15 MIN 3/16/2022 Zack Tafoya COTA GO 2               RACHEL Santiago  3/16/2022

## 2022-03-16 NOTE — CASE MANAGEMENT/SOCIAL WORK
Continued Stay Note   [facility]     Patient Name: Ena Upton  MRN: [id number]  Today's Date: 3/16/2022    Admit Date: 3/5/2022     Discharge Plan     Row Name 03/16/22 1320       Plan    Plan [facility] pending precert    Patient/Family in Agreement with Plan yes    Plan Comments Precert still pending. Insurance informed [facility] that they will have a decision by the end of the day.               Discharge Codes    No documentation.                     GREGORIO Angeles

## 2022-03-16 NOTE — CASE MANAGEMENT/SOCIAL WORK
Continued Stay Note  [facility]     Patient Name: Ena Upton  MRN: [id number]  Today's Date: 3/16/2022    Admit Date: 3/5/2022     Discharge Plan     Row Name 03/16/22 1447       Plan    Plan [facility]    Patient/Family in Agreement with Plan yes    Plan Comments Precert complete but now pending dialysis chair time. Sandy thomas [employer] is working with [place] to get a chair time and will have it in am.    Row Name 03/16/22 1320       Plan    Plan [facility] pending precert    Patient/Family in Agreement with Plan yes    Plan Comments Precert still pending. Insurance informed [facility] that they will have a decision by the end of the day.               Discharge Codes    No documentation.                     GREGORIO Angeles

## 2022-03-16 NOTE — PLAN OF CARE
Goal Outcome Evaluation:  Plan of Care Reviewed With: patient        Progress: no change  Outcome Evaluation: Pt c/o pain, PRN pain medicine given see MAR. Tolerating diet. Lovenox for VTE. Creat 3.88, BUN 31, hgb 7.9. Accucheck, SSI given see MAR. Lidocain patches to right ribs. Nicotine patch to right arm. Mepilex to coccyx. VSS, safety maintained

## 2022-03-16 NOTE — PROGRESS NOTES
Nephrology ([facility]) Progress Note      Patient:  Ena Upton  YOB: 1970  Date of Service: 3/16/2022  MRN: [id number]   Acct: [id number]   Primary Care Physician: Yaron Wiggins APRN  Advance Directive:   Code Status and Medical Interventions:   Ordered at: 03/07/22 0841     Level Of Support Discussed With:    Patient     Code Status (Patient has no pulse and is not breathing):    CPR (Attempt to Resuscitate)     Medical Interventions (Patient has pulse or is breathing):    Full Support     Admit Date: 3/5/2022       Hospital Day: 11  Referring Provider: No Known Provider      Patient personally seen and examined.  Complete chart including Consults, Notes, Operative Reports, Labs, Cardiology, and Radiology studies reviewed as able.        Subjective:  Ena Upton is a 52 y.o. female for whom we were consulted for evaluation and treatment of end stage renal disease. Maintenance hemodialysis on Tuesday Thursday Saturday at [facility].  Frequently skips her dialysis treatments. History of type 2 diabetes, hypertension, hypothyroid. Found down at home on 3/05 and brought to ER via EMS. Initial blood glucose over 900 and patient required intubation. Was on Cardene drip due to hypertension. Extubated on 3/06.  Following IV and PO potassium supplementation, she developed hyperkalemia of 6.6. This was managed medically. Sent to medical floor on 3/07. On 3/08 developed significant hypoglycemia. Ultimately required resumption of dextrose IV fluids to maintain blood glucose. Completed HD on 3/08 without issue. Additional HD on 3/10 & 3/12.  Patient has had imaging which has shown right 4-7th rib fractures secondary to a recent fall as well as a previously documented left distal femur fracture.    Today is feeling about the same. Still has pain at fracture sites.  No new overnight issues      Allergies:  Penicillins, Lamisil [terbinafine], Reglan  [metoclopramide], and Stadol [butorphanol]    Home Meds:  Medications Prior to Admission   Medication Sig Dispense Refill Last Dose   • acetaminophen (TYLENOL) 325 MG tablet Take 2 tablets by mouth Every 6 (Six) Hours As Needed for Mild Pain .   Unknown at Unknown time   • albuterol (PROVENTIL HFA;VENTOLIN HFA) 108 (90 BASE) MCG/ACT inhaler Inhale 2 puffs Every 4 (Four) Hours As Needed for wheezing.   Unknown at Unknown time   • cloNIDine (CATAPRES-TTS) 0.1 MG/24HR patch Place 1 patch on the skin as directed by provider 1 (One) Time Per Week.   Unknown at Unknown time   • DULoxetine (CYMBALTA) 60 MG capsule Take 60 mg by mouth daily.   Unknown at Unknown time   • famotidine (PEPCID) 20 MG tablet Take 20 mg by mouth Daily.   Unknown at Unknown time   • hydrALAZINE (APRESOLINE) 50 MG tablet Take 50 mg by mouth 3 (Three) Times a Day.   Unknown at Unknown time   • insulin aspart (novoLOG FLEXPEN) 100 UNIT/ML solution pen-injector sc pen Inject 14 Units under the skin into the appropriate area as directed 3 (Three) Times a Day With Meals.   Unknown at Unknown time   • Insulin Glargine (BASAGLAR KWIKPEN) 100 UNIT/ML injection pen Inject 70 Units under the skin into the appropriate area as directed Every Night.   Unknown at Unknown time   • isosorbide mononitrate (IMDUR) 120 MG 24 hr tablet Take 120 mg by mouth Daily.      • lactulose (CHRONULAC) 10 GM/15ML solution Take 10 g by mouth 3 (Three) Times a Day With Meals. For constipation - HOLD for loose stools      • levothyroxine (SYNTHROID, LEVOTHROID) 200 MCG tablet Take 200 mcg by mouth Daily.   Unknown at Unknown time   • minoxidil (LONITEN) 2.5 MG tablet Take 2.5 mg by mouth Daily.   Unknown at Unknown time   • ondansetron (ZOFRAN) 4 MG tablet Take 4 mg by mouth Every 8 (Eight) Hours As Needed for Nausea or Vomiting.   Unknown at Unknown time   • senna (senna) 8.6 MG tablet Take 1 tablet by mouth Daily.   Unknown   • simvastatin (ZOCOR) 20 MG tablet Take 20 mg by mouth  Daily.   Unknown at Unknown time   • vitamin D (ERGOCALCIFEROL) 1.25 MG (14970 UT) capsule capsule Take 50,000 Units by mouth Every 14 (Fourteen) Days.   Unknown at Unknown time       Medicines:  Current Facility-Administered Medications   Medication Dose Route Frequency Provider Last Rate Last Admin   • acetaminophen (TYLENOL) tablet 650 mg  650 mg Oral Q4H PRN Thomas Oliver DO   650 mg at 03/09/22 1754    Or   • acetaminophen (TYLENOL) 160 MG/5ML solution 650 mg  650 mg Oral Q4H PRN Thomas Oliver DO        Or   • acetaminophen (TYLENOL) suppository 650 mg  650 mg Rectal Q4H PRN Thomas Oliver DO       • atorvastatin (LIPITOR) tablet 20 mg  20 mg Oral Nightly Thomas Oliver DO   20 mg at 03/15/22 2027   • carvedilol (COREG) tablet 25 mg  25 mg Oral BID With Meals Thomas Oliver DO   25 mg at 03/16/22 0822   • cloNIDine (CATAPRES) tablet 0.1 mg  0.1 mg Oral Q12H Thomas Oliver DO   0.1 mg at 03/16/22 0822   • cloNIDine (CATAPRES-TTS) 0.1 MG/24HR patch 1 patch  1 patch Transdermal Weekly Thomas Oliver DO   1 patch at 03/10/22 2058   • dextrose (D50W) (25 g/50 mL) IV injection 12.5 g  12.5 g Intravenous PRN Thomas Oliver DO   12.5 g at 03/06/22 2212   • dextrose (D50W) (25 g/50 mL) IV injection 25 g  25 g Intravenous Q15 Min PRN Thomas Oliver DO   25 g at 03/08/22 0922   • dextrose (GLUTOSE) oral gel 15 g  15 g Oral Q15 Min PRN Thomas Oliver DO   15 g at 03/14/22 0631   • DULoxetine (CYMBALTA) DR capsule 60 mg  60 mg Oral Daily Thomas Oliver DO   60 mg at 03/16/22 0822   • enoxaparin (LOVENOX) syringe 30 mg  30 mg Subcutaneous Q24H Thomas Oliver DO   30 mg at 03/15/22 1229   • epoetin yolanda-epbx (RETACRIT) injection 10,000 Units  10,000 Units Subcutaneous Once per day on Mon Wed Fri Eron Blood MD   10,000 Units at 03/16/22 0822   • famotidine (PEPCID) tablet 20 mg  20 mg Oral Daily Thomas Oliver DO   20 mg at 03/16/22 0822   • glucagon (human recombinant) (GLUCAGEN  DIAGNOSTIC) injection 1 mg  1 mg Intramuscular Q15 Min PRN Thomas Oliver, DO   1 mg at 03/14/22 0654   • hydrALAZINE (APRESOLINE) tablet 75 mg  75 mg Oral Q8H Thomas Oliver, DO   75 mg at 03/16/22 0534   • HYDROcodone-acetaminophen (NORCO)  MG per tablet 1 tablet  1 tablet Oral Q4H PRN Tohmas Oliver, DO   1 tablet at 03/16/22 0832   • insulin detemir (LEVEMIR) injection 5 Units  5 Units Subcutaneous Nightly Narinder Madrid MD   5 Units at 03/15/22 2032   • insulin lispro (humaLOG) injection 2-7 Units  2-7 Units Subcutaneous Q4H Thomas Oliver, DO   2 Units at 03/16/22 0437   • ipratropium-albuterol (DUO-NEB) nebulizer solution 3 mL  3 mL Nebulization 4x Daily - RT Thomas Oliver, DO   3 mL at 03/16/22 1021   • isosorbide mononitrate (IMDUR) 24 hr tablet 60 mg  60 mg Oral Q24H Thomas Oliver, DO   60 mg at 03/16/22 0822   • labetalol (NORMODYNE,TRANDATE) injection 10 mg  10 mg Intravenous Q4H PRN Thomas Oliver, DO   10 mg at 03/07/22 0411   • lactulose solution 20 g  20 g Oral Daily Thomas Oliver, DO   20 g at 03/16/22 0822   • levothyroxine (SYNTHROID, LEVOTHROID) tablet 200 mcg  200 mcg Oral Q AM Thomas Oliver, DO   200 mcg at 03/16/22 0534   • lidocaine (LIDODERM) 5 % 2 patch  2 patch Transdermal Q24H Thomas Oliver, DO   2 patch at 03/16/22 0821   • nicotine (NICODERM CQ) 14 MG/24HR patch 1 patch  1 patch Transdermal Q24H Thomas Oliver, DO   1 patch at 03/16/22 0822   • NIFEdipine XL (PROCARDIA XL) 24 hr tablet 90 mg  90 mg Oral Q24H Thomas Oliver, DO   90 mg at 03/16/22 0822   • ondansetron (ZOFRAN) injection 4 mg  4 mg Intravenous Q6H PRN Thomas Oliver, DO       • ondansetron ODT (ZOFRAN-ODT) disintegrating tablet 4 mg  4 mg Oral Q6H PRN Thomas Oliver DO   4 mg at 03/14/22 0823   • senna (SENOKOT) tablet 1 tablet  1 tablet Oral Daily Thomas Oliver DO   1 tablet at 03/16/22 0822   • sodium chloride 0.45 % infusion  50 mL/hr Intravenous Continuous PRN Thomas Oliver,        •  sodium chloride 0.9 % flush 10 mL  10 mL Intravenous Q12H Thomas Oliver DO   10 mL at 03/15/22 2032   • sodium chloride 0.9 % flush 10 mL  10 mL Intravenous PRN Thomas Oliver DO       • sodium chloride 0.9 % infusion  50 mL/hr Intravenous Continuous PRN Thomas Oliver, DO       • sodium chloride 0.9 % infusion  10 mL/hr Intravenous Continuous PRN Thomas Oliver, DO           Past Medical History:  Past Medical History:   Diagnosis Date   • Anxiety    • Arthritis    • Chronic kidney disease     STAGE V RENAL FAILURE   • Depression    • Diabetes mellitus (HCC)    • HTN (hypertension)    • Hypothyroidism    • Obesity    • Tremors of nervous system        Past Surgical History:  Past Surgical History:   Procedure Laterality Date   • ARTERIOVENOUS FISTULA     • BREAST BIOPSY     • CARPAL TUNNEL RELEASE     • CATH LAB PROCEDURE     • CHOLECYSTECTOMY     • TUBAL ABDOMINAL LIGATION         Family History  Family History   Problem Relation Age of Onset   • Cancer Other    • Hypertension Other    • Kidney disease Other    • Heart disease Other    • Diabetes Other    • Diabetes Mother    • Diabetes Maternal Aunt    • Diabetes Maternal Grandmother        Social History  Social History     Socioeconomic History   • Marital status:    Tobacco Use   • Smoking status: Current Every Day Smoker     Packs/day: 0.50   • Smokeless tobacco: Never Used   • Tobacco comment: CUTTING BACK AND TRYING TO STOP SMOKING   Substance and Sexual Activity   • Alcohol use: No   • Drug use: No   • Sexual activity: Defer       Review of Systems:  History obtained from chart review and the patient  General ROS: No fever or chills  Respiratory ROS: No cough, shortness of breath, wheezing  Cardiovascular ROS: No chest pain or palpitations  Gastrointestinal ROS: No abdominal pain or melena  Genito-Urinary ROS: No dysuria or hematuria  Psych ROS: No anxiety and depression  14 point ROS reviewed with the patient and negative except as noted  above and in the HPI unless unable to obtain.    Objective:  Patient Vitals for the past 24 hrs:   BP Temp Temp src Pulse Resp SpO2   03/16/22 1026 -- -- -- 71 16 --   03/16/22 1021 -- -- -- 72 16 96 %   03/16/22 0757 129/60 98.5 °F (36.9 °C) Oral 70 16 97 %   03/16/22 0630 -- -- -- 72 16 --   03/16/22 0625 -- -- -- 72 16 97 %   03/16/22 0533 126/65 98.2 °F (36.8 °C) Oral 74 18 96 %   03/15/22 2256 166/70 -- -- 87 -- --   03/15/22 2032 -- -- -- 85 16 100 %   03/15/22 2027 -- -- -- 86 16 96 %   03/15/22 2026 152/72 99.7 °F (37.6 °C) Oral 85 16 95 %   03/15/22 1530 139/67 98.5 °F (36.9 °C) Oral 91 18 92 %   03/15/22 1427 -- -- -- 77 -- 100 %   03/15/22 1420 -- -- -- 79 15 97 %   03/15/22 1209 130/56 97.8 °F (36.6 °C) Oral 74 16 96 %       Intake/Output Summary (Last 24 hours) at 3/16/2022 1037  Last data filed at 3/16/2022 0900  Gross per 24 hour   Intake 240 ml   Output 3000 ml   Net -2760 ml     General: awake/alert   Chest:  clear to auscultation bilaterally without respiratory distress  CVS: regular rate and rhythm  Abdominal: soft, nontender, positive bowel sounds  Extremities: no cyanosis or edema  Skin: warm and dry without rash   Neuro: no focal motor deficits      Labs:  Results from last 7 days   Lab Units 03/16/22  0428 03/15/22  0438 03/14/22  0435   WBC 10*3/mm3 5.65 4.40 3.88   HEMOGLOBIN g/dL 7.9* 8.0* 8.4*   HEMATOCRIT % 26.8* 27.4* 28.6*   PLATELETS 10*3/mm3 187 203 228         Results from last 7 days   Lab Units 03/16/22  0428 03/15/22  0438 03/14/22  0435   SODIUM mmol/L 137 133* 135*   POTASSIUM mmol/L 4.3 5.5* 5.4*   CHLORIDE mmol/L 98 96* 97*   CO2 mmol/L 28.0 23.0 25.0   BUN mg/dL 31* 50* 35*   CREATININE mg/dL 3.88* 5.26* 4.28*   CALCIUM mg/dL 8.5* 8.4* 8.8   EGFR mL/min/1.73 13.3* 9.2* 11.8*   BILIRUBIN mg/dL  --  0.3  --    ALK PHOS U/L  --  106  --    ALT (SGPT) U/L  --  8  --    AST (SGOT) U/L  --  12  --    GLUCOSE mg/dL 180* 88 52*       Radiology:   Imaging Results (Last 72 Hours)      ** No results found for the last 72 hours. **          Culture:  Blood Culture   Date Value Ref Range Status   03/05/2022 Staphylococcus, coagulase negative (C)  Preliminary   03/05/2022 No growth at 24 hours  Preliminary     Urine Culture   Date Value Ref Range Status   03/05/2022 50,000 CFU/mL Staphylococcus, coagulase negative (A)  Final     Comment:     By laboratory criteria, addititional testing is not indicated and unlikely to produce clinically relevant information.  Coagulase negative staphylococci are common skin contaminants, members of the oral lilly,  and of low virulence; requires clinical correlation.           Assessment   1.  End stage renal disease on HD TTS  2.  Type 2 diabetes   3.  Hypertension  4.  Hyperosmolar hyperglycemia--improved  5.  Metabolic acidosis--improved  6.  Metabolic encephalopathy--resolving  7.  Hyponatremia--improved  8.  Anemia of CKD  9.  Secondary hyperparathyroidism  10.  Hyperkalemia--improved    Plan:  1.  Dialysis next due 3/17  2.  Monitor labs    GAEL Campo  3/16/2022  10:37 CDT

## 2022-03-16 NOTE — PLAN OF CARE
Problem: Adult Inpatient Plan of Care  Goal: Plan of Care Review  Flowsheets (Taken 3/16/2022 9490)  Progress: improving  Plan of Care Reviewed With: patient  Outcome Evaluation: Pt. completes ue exs to increase her transfers and bed mobility!   Goal Outcome Evaluation:  Plan of Care Reviewed With: patient        Progress: improving  Outcome Evaluation: Pt. completes ue exs to increase her transfers and bed mobility!

## 2022-03-16 NOTE — PROGRESS NOTES
[facility] Medicine Services  INPATIENT PROGRESS NOTE    Length of Stay: 11  Date of Admission: 3/5/2022  Primary Care Physician: Yaron Wiggins APRN    Subjective   Chief Complaint: Left leg pain/right rib pain.    HPI   Ongoing dialysis as scheduled.  Waiting for rehab to place.  Afebrile.  Blood pressure stable.  Buttock wound, seems like pressur ulcers, consult wound care.    Review of Systems   Constitutional: Positive for activity change, appetite change and fatigue. Negative for chills and fever.   HENT: Negative for hearing loss, nosebleeds, tinnitus and trouble swallowing.    Eyes: Negative for visual disturbance.   Respiratory: Negative for cough, chest tightness, shortness of breath and wheezing.    Cardiovascular: Negative for chest pain, palpitations and leg swelling.   Gastrointestinal: Negative for abdominal distention, abdominal pain, blood in stool, constipation, diarrhea, nausea and vomiting.   Endocrine: Negative for cold intolerance, heat intolerance, polydipsia, polyphagia and polyuria.   Genitourinary: Negative for decreased urine volume, difficulty urinating, dysuria, flank pain, frequency and hematuria.   Musculoskeletal: Positive for arthralgias, gait problem and myalgias. Negative for joint swelling.   Skin: Negative for rash.   Allergic/Immunologic: Negative for immunocompromised state.   Neurological: Positive for weakness. Negative for dizziness, syncope, light-headedness and headaches.   Hematological: Negative for adenopathy. Does not bruise/bleed easily.   Psychiatric/Behavioral: Negative for confusion and sleep disturbance. The patient is not nervous/anxious.         All pertinent negatives and positives are as above. All other systems have been reviewed and are negative unless otherwise stated.     Objective    Temp:  [97.9 °F (36.6 °C)-99.7 °F (37.6 °C)] 97.9 °F (36.6 °C)  Heart Rate:  [70-91] 75  Resp:  [15-18] 16  BP: (126-166)/(54-72)  135/54    Intake/Output Summary (Last 24 hours) at 3/16/2022 1326  Last data filed at 3/16/2022 0900  Gross per 24 hour   Intake 240 ml   Output --   Net 240 ml     Physical Exam  Vitals and nursing note reviewed.   Constitutional:       Appearance: She is well-developed.   HENT:      Head: Normocephalic.   Eyes:      Conjunctiva/sclera: Conjunctivae normal.      Pupils: Pupils are equal, round, and reactive to light.   Neck:      Vascular: No JVD.   Cardiovascular:      Rate and Rhythm: Normal rate and regular rhythm.      Heart sounds: Normal heart sounds. No murmur heard.    No friction rub. No gallop.   Pulmonary:      Effort: Pulmonary effort is normal. No respiratory distress.      Breath sounds: Normal breath sounds. No wheezing or rales.      Comments: Diminished breath sound bilateral, clear.  Chest:      Chest wall: No tenderness.   Abdominal:      General: Bowel sounds are normal. There is no distension.      Palpations: Abdomen is soft.      Tenderness: There is no abdominal tenderness. There is no guarding or rebound.   Musculoskeletal:         General: No tenderness or deformity.      Cervical back: Neck supple.      Comments: Left femur fracture.    Nondisplaced fracture.   Skin:     General: Skin is warm and dry.      Capillary Refill: Capillary refill takes 2 to 3 seconds.      Findings: No rash.   Neurological:      General: No focal deficit present.      Mental Status: She is alert and oriented to person, place, and time.      Cranial Nerves: No cranial nerve deficit.      Motor: Weakness present. No abnormal muscle tone.      Coordination: Coordination abnormal.      Gait: Gait abnormal.      Deep Tendon Reflexes: Reflexes normal.   Psychiatric:         Behavior: Behavior normal.         Thought Content: Thought content normal.   Results Review:  Lab Results (last 24 hours)     Procedure Component Value Units Date/Time    POC Glucose Once [[id number]]  (Abnormal) Collected: 03/16/22 4579     Specimen: Blood Updated: 03/16/22 1210     Glucose 156 mg/dL      Comment: : [id number] Liu Logan ID: [id number]       POC Glucose Once [[id number]]  (Normal) Collected: 03/16/22 0805    Specimen: Blood Updated: 03/16/22 0807     Glucose 118 mg/dL      Comment: : [id number] Otis RicardoMeter ID: [id number]       Basic Metabolic Panel [[id number]]  (Abnormal) Collected: 03/16/22 0428    Specimen: Blood Updated: 03/16/22 0543     Glucose 180 mg/dL      BUN 31 mg/dL      Creatinine 3.88 mg/dL      Sodium 137 mmol/L      Potassium 4.3 mmol/L      Chloride 98 mmol/L      CO2 28.0 mmol/L      Calcium 8.5 mg/dL      BUN/Creatinine Ratio 8.0     Anion Gap 11.0 mmol/L      eGFR 13.3 mL/min/1.73      Comment: <15 Indicative of kidney failure       Narrative:      GFR Normal >60  Chronic Kidney Disease <60  Kidney Failure <15      CBC (No Diff) [[id number]]  (Abnormal) Collected: 03/16/22 0428    Specimen: Blood Updated: 03/16/22 0524     WBC 5.65 10*3/mm3      RBC 2.79 10*6/mm3      Hemoglobin 7.9 g/dL      Hematocrit 26.8 %      MCV 96.1 fL      MCH 28.3 pg      MCHC 29.5 g/dL      RDW 18.2 %      RDW-SD 62.3 fl      MPV 9.8 fL      Platelets 187 10*3/mm3     POC Glucose Once [[id number]]  (Abnormal) Collected: 03/16/22 0435    Specimen: Blood Updated: 03/16/22 0446     Glucose 173 mg/dL      Comment: : [id number] JayMeter ID: [id number]       POC Glucose Once [[id number]]  (Abnormal) Collected: 03/15/22 2255    Specimen: Blood Updated: 03/15/22 2312     Glucose 252 mg/dL      Comment: : [id number] JayMeter ID: [id number]       POC Glucose Once [[id number]]  (Abnormal) Collected: 03/15/22 2027    Specimen: Blood Updated: 03/15/22 2044     Glucose 305 mg/dL      Comment: : [id number] JayMeter ID: [id number]       POC Glucose Once [[id number]]  (Abnormal) Collected: 03/15/22 1557    Specimen: Blood Updated: 03/15/22 1608     Glucose 203 mg/dL      Comment: :  [id number] [facility] [id number]              Cultures:  No results found for: BLOODCX, URINECX, WOUNDCX, MRSACX, RESPCX, STOOLCX    Radiology Data:    Imaging Results (Last 24 Hours)     ** No results found for the last 24 hours. **          Allergies   Allergen Reactions   • Penicillins Hives and Shortness Of Breath   • Lamisil [Terbinafine]    • Reglan [Metoclopramide] GI Intolerance   • Stadol [Butorphanol] Nausea And Vomiting       Scheduled meds:   atorvastatin, 20 mg, Oral, Nightly  carvedilol, 25 mg, Oral, BID With Meals  cloNIDine, 0.1 mg, Oral, Q12H  cloNIDine, 1 patch, Transdermal, Weekly  DULoxetine, 60 mg, Oral, Daily  enoxaparin, 30 mg, Subcutaneous, Q24H  epoetin yolanda/yolanda-epbx, 10,000 Units, Subcutaneous, Once per day on Mon Wed Fri  famotidine, 20 mg, Oral, Daily  hydrALAZINE, 75 mg, Oral, Q8H  insulin detemir, 5 Units, Subcutaneous, Nightly  insulin lispro, 2-7 Units, Subcutaneous, Q4H  ipratropium-albuterol, 3 mL, Nebulization, 4x Daily - RT  isosorbide mononitrate, 60 mg, Oral, Q24H  lactulose, 20 g, Oral, Daily  levothyroxine, 200 mcg, Oral, Q AM  lidocaine, 2 patch, Transdermal, Q24H  nicotine, 1 patch, Transdermal, Q24H  NIFEdipine XL, 90 mg, Oral, Q24H  senna, 1 tablet, Oral, Daily  sodium chloride, 10 mL, Intravenous, Q12H        PRN meds:  •  acetaminophen **OR** acetaminophen **OR** acetaminophen  •  dextrose  •  dextrose  •  dextrose  •  glucagon (human recombinant)  •  HYDROcodone-acetaminophen  •  labetalol  •  ondansetron  •  ondansetron ODT  •  sodium chloride  •  sodium chloride  •  sodium chloride  •  sodium chloride    Assessment/Plan       Metabolic encephalopathy    ESRD on hemodialysis (HCC)    Uncontrolled type 2 diabetes mellitus with hyperglycemia (HCC)    Hyperkalemia    Hypertensive emergency    Hypothyroidism    HHNC (hyperglycemic hyperosmolar nonketotic coma) ([facility])    Hyponatremia    Elevated troponin due to ESRD and hypertensive urgency    Acute respiratory  failure secondary to an inability to protect her airway    Tobacco abuse    Anemia, chronic disease    Nondisplaced spiral fracture of shaft of left femur, subsequent encounter for closed fracture with delayed healing    Closed fracture of multiple ribs of right side      Plan:  HPI . the patient was admitted on 3/5 by Dr. Prado.  She was found unresponsive.  EMS arrived on the scene and placed an LMA.  She was ultimately intubated in the emergency department.  She had a blood sugar of greater than 900 on arrival and was also severely hypertensive.  She has end-stage renal disease and does dialysis on Tuesday, Thursday, and Saturday.     Respiratory failure. Resolved.   She required ventilatory support in the emergency department secondary to altered mental status and inability to protect her airway.  She was extubated on Sunday, 3/6.  She has been evaluated by pulmonary.  She remains on room air.     Renal failure/dialysis patient . Cr improving. Nephrology consult.  She received dialysis on Saturday in the intensive care unit setting.      No sign of infection.  She had 1 bottle positive for Staphylococcus that was coagulase-negative.  Stopped vancomycin on 3/7. Consider this to be a contaminant.  She has also recently been on ceftriaxone.  This was also stopped to monitor.      Diabetes. Sugar stable.   She was initially on an insulin drip.  This was removed on 3/6.  She was reordered long-acting insulin and sliding scale insulin.  However, she has had some problems with hypoglycemia and was recently placed on dextrose fluids, which we stopped 3/9.  Completely stopped long-acting insulin for now, but will resume a lower dose tonight.  Her most recent hemoglobin A1c was 12.4 in January 2022.  Continue Levemir . low sliding scale.  Hemoglobin A1 C 9.7.     Fracture of left distal femur diaphysis extending to the metaphysis with no significant displacement.  The patient had been told to be nonweightbearing at the  skilled nursing facility.  She has not seen orthopedics since the fracture was first discovered at [facility] in January. Dr. Hatfield with ortho saw her. He recommended a knee immobilizer when active and during sleep but may unstrap immobilizer while immobile, throughout the day.  Okay for gentle knee range of motion with PT. Nonweightbearing to left lower extremity.     Right rib fractures.  Duo nebs.  Lidocaine patch.  Plain films of right ribs on 3/12 showed multiple rib fractures on the right laterally.  Applying Lidoderm patches.  Make her Norco every 4 hours as needed rather than every 6 hours as needed.  On 1 L of oxygen.     Hypertension/hyperlipidemia/CAD.  Lipitor.  Coreg.  Catapres.  Hydralazine.  Imdur.  Labetalol as needed.     Hyponatremia.  Resolved.      Hyperkalemia. Resolved.      Anemia.  Erythropoietin.  Hemoglobin stable.  No sign of acute bleed.    Pressure wound buttocks region.  Consult wound care.     Depression.  Cymbalta.     Reflux.  Pepcid prophylaxis.  Zofran as needed.     Constipation.  Lactulose.  Senokot.     Pain.  Norco as needed.     Hypothyroidism.  Synthroid.  Elevated TSH.  Low free T4.     Lovenox DVT prophylaxis.     Chronic smoker.  Discussed patient cutting back and stopping.  Offer nicotine patch.  Nicotine patch.     Nutrition . speech consult.  Regular/thin/consistent carb/low potassium diet.  Nova source renal dietary supplement.     Deconditioning.  PT and OT consult.      Blood ysycqrt-rrsznybmc-eqtpnqdo stap 1 out of 2 bottles h-probably contamination.  Urine culture-coagulase staph negative-probably contamination . Covid-19-negative.     Discharge Planning: Waiting for rehab placement.  Patient's been accepted [facility], waiting for precertification.    Electronically signed by Narinder Madrid MD, 03/16/22, 1:26 PM CDT.

## 2022-03-17 NOTE — PLAN OF CARE
Goal Outcome Evaluation:  Plan of Care Reviewed With: other (see comments)        Progress: no change  Outcome Evaluation: Ntn follow up. Pt is currently in HD. She remains on C.CHO, low potassium diet. She also receives Novasource Renal  once daily. She consumed 100% of the last 3 meals. She will be going to [facility] with OP HD when medically ready for d/c. She prefers soft foods r/t poor dentition. Will cont to follow.

## 2022-03-17 NOTE — PLAN OF CARE
Goal Outcome Evaluation:  Plan of Care Reviewed With: patient        Progress: no change  Outcome Evaluation: Patient c/o pain to right ribcage and left lower extremity. PO pain meds given PRN. Lovenox. LAV fistula C/D/I. Mepilex dressing to coccyx changed. Nystatin ointment applied to perineum. No IV access. Hypertensive at times. Safety maintained. No distress noted at this time.

## 2022-03-17 NOTE — DISCHARGE SUMMARY
[facility] Medicine Services  DISCHARGE SUMMARY       Date of Admission: 3/5/2022  Date of Discharge:  3/17/2022  Primary Care Physician: Yaron Wiggins APRN    Presenting Problem/History of Present Illness:  Altered mental status, unspecified altered mental status type [R41.82]     Final Discharge Diagnoses:  Active Hospital Problems    Diagnosis    • **Metabolic encephalopathy    • Closed fracture of multiple ribs of right side    • Nondisplaced spiral fracture of shaft of left femur, subsequent encounter for closed fracture with delayed healing    • Acute respiratory failure secondary to an inability to protect her airway    • Tobacco abuse    • Anemia, chronic disease    • Elevated troponin due to ESRD and hypertensive urgency    • Hyponatremia    • HHNC (hyperglycemic hyperosmolar nonketotic coma) (Formerly [facility])    • Hypertensive emergency    • Hypothyroidism    • Hyperkalemia    • Uncontrolled type 2 diabetes mellitus with hyperglycemia (Formerly [facility])    • ESRD on hemodialysis (Formerly [facility])        Consults: Diabetic educator.  Wound care.  Pulmonary.  Nephrology    Pertinent Test Results:   Results for orders placed during the hospital encounter of 03/05/22    Adult Transthoracic Echo Complete W/ Cont if Necessary Per Protocol    Interpretation Summary  · Left ventricular systolic function is normal. Left ventricular ejection fraction appears to be 61 - 65%.  · Left ventricular wall thickness is consistent with mild concentric hypertrophy.  · Normal right ventricular cavity size and systolic function noted.  · Mild mitral valve stenosis is present.  · Trace tricuspid valve regurgitation is present. Estimated right ventricular systolic pressure from tricuspid regurgitation is normal (<35 mmHg).    .  Imaging Results (All)     Procedure Component Value Units Date/Time    XR Ribs 2 View Right [[id number]] Collected: 03/12/22 1322     Updated: 03/12/22 1327    Narrative:      EXAMINATION:  XR RIBS  2 VW RIGHT-  3/12/2022 1:14 PM CST     HISTORY: Rib pain after fall.     TECHNIQUE: 2 views and 4 images were obtained of the ribs.     COMPARISON: No comparison study.     FINDINGS:  There are nondisplaced fractures of the right fourth, fifth,  sixth and seventh ribs laterally. There is no evidence of right-sided  pneumothorax.       Impression:      Acute right rib fractures, as described.     This report was finalized on 03/12/2022 13:24 by Dr. Aubrey Lam MD.    XR Femur 2 View Left [[id number]] Collected: 03/08/22 1721     Updated: 03/08/22 1725    Narrative:      HISTORY: Pain     Left femur: 2 views left femur are obtained.     There is a spiral type fracture involving the left distal femoral  diaphysis 6 extending to the metaphysis. No prominent displacement.  Proximal and mid left femur are intact. Diffuse vascular calcification.       Impression:      1. Oblique/spiral type fracture of the left distal femoral diaphysis  extending to the metaphysis. No significant displacement.  This report was finalized on 03/08/2022 17:22 by Dr. Desire Castro MD.    XR Pelvis 1 or 2 View [[id number]] Collected: 03/08/22 1624     Updated: 03/08/22 1628    Narrative:      EXAMINATION: XR PELVIS 1 OR 2 VW- 3/8/2022 4:24 PM CST     HISTORY: Pain.     REPORT: An AP view the pelvis was obtained.     COMPARISON: There are no correlative imaging studies for comparison.     The images are underpenetrated, limiting of the hips appears normal and  no fracture is identified. There is no evidence of AVN. The osseous  structures appear osteopenic. Moderate stool volume is present. There  are scattered phleboliths in the pelvis.       Impression:      Limited exam due to underpenetration and possible diffuse  osteopenia. No acute acute osseous abnormality is identified.  This report was finalized on 03/08/2022 16:25 by Dr. Farhad Munoz MD.    XR Chest 1 View [[id number]] Collected: 03/06/22 0822     Updated: 03/06/22 0826     Narrative:      EXAMINATION: XR CHEST 1 VW- 3/6/2022 8:22 AM CST     HISTORY: Intubated Patient; R41.82-Altered mental status, unspecified.     REPORT: A frontal view of the chest was obtained.     COMPARISON: Chest x-ray 3/5/2022 1002 hours.     The endotracheal tube and NG tube appear in satisfactory position , the  right lung is clear, there are interstitial infiltrates in the left  medial lung extending into the left lung base, with there is also  probable atelectasis. No pneumothorax or pleural effusion is identified.  Vascular stents are noted in the left subclavian region, left upper arm.       Impression:      Interval insertion of an NG tube, in satisfactory position.  The endotracheal tube remains in good position. No change in patchy  interstitial infiltrates in the left lung and streaky atelectasis in the  left lung base.  This report was finalized on 03/06/2022 08:23 by Dr. Farhad Munoz MD.    XR Abdomen KUB [[id number]] Collected: 03/05/22 1723     Updated: 03/05/22 1726    Narrative:      HISTORY: NG placement     KUB: Frontal view of the lower chest and upper abdomen obtained.     Enteric tube tip is present within the body of the stomach. The  side-port at the GE junction. Left retrocardiac opacities.  Cholecystectomy clips.       Impression:      1. Enteric tube tip in the body the stomach with side-port at the GE  junction.  This report was finalized on 03/05/2022 17:23 by Dr. Desire Castro MD.    CT Cervical Spine Without Contrast [[id number]] Collected: 03/05/22 1047     Updated: 03/05/22 1054    Narrative:      EXAMINATION: CT CERVICAL SPINE WO CONTRAST- 3/5/2022 10:47 AM CST     HISTORY: Neck pain, acute, no red flags; R41.82-Altered mental status,  unspecified. Patient found on floor unresponsive. Unwitnessed fall.     DOSE: 362 mGycm (Automatic exposure control technique was implemented in  an effort to keep the radiation dose as low as possible without  compromising image quality)      REPORT: Spiral CT of the cervical spine was performed without contrast,  reconstructed coronal and sagittal images are also reviewed.     COMPARISON: There are no correlative imaging studies for comparison.     Sagittal images demonstrate straightening of the cervical curvature  without spondylolisthesis. There is moderate degenerative disc disease  at C6-7 with endplate spurring and mild disc space narrowing. No  fracture is identified. The patient is intubated and there is soft  tissue swelling in the hypopharynx. There is normal aeration of the  mastoid air cells. There is mild right-sided neuroforaminal narrowing at  C6-7 related to uncinate spurs. There is mild spinal stenosis at C6-7.  Soft tissue windows show no gross evidence of a traumatic cervical disc  herniation. The visualized upper lungs are clear. There is moderately  heavy calcified plaque within the carotid arteries at the bifurcation.  The       Impression:      No fracture, no acute osseous cervical spine abnormality.  Partial visualization of the endotracheal tube, with soft tissue  swelling in the hypopharynx, not unexpected. Moderate spondylosis at  C6-7, with mild spinal stenosis right-sided neuroforaminal narrowing..              This report was finalized on 03/05/2022 10:51 by Dr. Farhad Munoz MD.    CT Head Without Contrast [[id number]] Collected: 03/05/22 1044     Updated: 03/05/22 1049    Narrative:      EXAMINATION: CT HEAD WO CONTRAST- 3/5/2022 10:44 AM CST     HISTORY: Delirium; R41.82-Altered mental status, unspecified.  Unresponsive.     DOSE: 625 mGycm (Automatic exposure control technique was implemented in  an effort to keep the radiation dose as low as possible without  compromising image quality)     REPORT: Spiral CT of the head was performed without contrast,  reconstructed coronal and sagittal images are also reviewed.     COMPARISON: CT head without contrast 10/5/2021.     No intracranial hemorrhage, mass or mass effect  is identified. There is  mildly decreased attenuation in the periventricular white matter tracts  as before, compatible with mild chronic small vessel white matter  ischemic disease. The ventricles and basal cisterns are within normal  limits. Review of bone windows is negative for fractures. There soft  tissue swelling in the posterior nasopharynx which may be related to  recent endotracheal tube insertion.       Impression:      No acute intracranial abnormality. Mild chronic small vessel  white matter ischemic changes appear stable.     This report was finalized on 03/05/2022 10:46 by Dr. Farhad Munoz MD.    XR Chest 1 View [[id number]] Collected: 03/05/22 1035     Updated: 03/05/22 1040    Narrative:      EXAMINATION: XR CHEST 1 VW- 3/5/2022 10:35 AM CST     HISTORY: intubation; R41.82-Altered mental status, unspecified.     REPORT: A frontal view of the chest was obtained.     COMPARISON: Chest x-ray 10/4/2021.     The patient is intubated, the tip of the endotracheal tube appears in  satisfactory position, approximately 2.8 cm superior to the jessie.  There are interstitial infiltrates in the left midlung and lower lung  zones, with associated volume loss. The right lung is clear. There is  mild cardiomegaly, this may be exaggerated by technique and positioning.  The patient is on a backboard. There is a stent in the left subclavian  and axillary region as before. No pneumothorax or pleural effusion is  identified. The upper abdomen appears unremarkable.       Impression:      Satisfactory position of the endotracheal tube. Interstitial  infiltrates are seen throughout most of the left lung without  consolidation. There is mild cardiomegaly, without evidence of overt  CHF.  This report was finalized on 03/05/2022 10:37 by Dr. Farhad Munoz MD.        Chief Complaint on Day of Discharge: none    History of Present Illness on Day of Discharge:   Patient is a 52-year-old  female with past medical  history significant for end-stage renal disease requiring hemodialysis, insulin-dependent diabetes, hypertension on multiple outpatient antihypertensives, the presented to our hospital via EMS after being found unresponsive.  Patient is currently intubated and sedated on propofol.  I am unable to get any additional information directly from the patient in this clinical circumstance.  Per the emergency room provider patient was found in her home today unresponsive.  It was reported that her last known well was last night.  When EMS arrived they checked her blood glucose and it was found too high to read.  An LMA was placed by EMS, and patient was brought to our facility for further work-up and management.  She was found to have a blood sugar greater than 900 on arrival.  It sounds like that she is due to get dialysis today, and evidently is on a Tuesday, Thursday, Saturday dialysis regimen.  I do not know if she has missed any dialysis sessions leading up to this hospitalization.  It was also reported that she had some blood in her mouth when EMS arrived, concern for tongue laceration.  She has no previous history of seizure disorder.  Upon arrival patient was found to have a GCS of 3.  She is just arrived to the intensive care unit and nursing staff does report that she was awake and able to follow commands shortly after arrival.  Currently she is on sedation with propofol, and has also been started on a Cardene drip.  We just recheck patient's blood sugar upon arrival to the intensive care unit and again remains too high to read.  It looks like 5 units of regular insulin has been administered x1 so far.      Hospital Course:  The patient is a 52 y.o. female who presented to [facility] with respiratory failure/renal failure with dialysis/diabetes/fracture of left distal femur-no significant displacement, rib fractures.      HPI . the patient was admitted on 3/5 by Dr. Prado.  She was found  unresponsive.  EMS arrived on the scene and placed an LMA.  She was ultimately intubated in the emergency department.  She had a blood sugar of greater than 900 on arrival and was also severely hypertensive.  She has end-stage renal disease and does dialysis on Tuesday, Thursday, and Saturday.     Respiratory failure. Resolved.   She required ventilatory support in the emergency department secondary to altered mental status and inability to protect her airway.  She was extubated on Sunday, 3/6.  She has been evaluated by pulmonary.  She remains on room air.     Renal failure/dialysis patient . Cr improving. Nephrology consult.  She received dialysis on Saturday in the intensive care unit setting.      No sign of infection.  She had 1 bottle positive for Staphylococcus that was coagulase-negative.  Stopped vancomycin on 3/7. Consider this to be a contaminant.  She has also recently been on ceftriaxone.  This was also stopped to monitor.      Diabetes. Sugar stable.   She was initially on an insulin drip.  This was removed on 3/6.  She was reordered long-acting insulin and sliding scale insulin.  However, she has had some problems with hypoglycemia and was recently placed on dextrose fluids, which we stopped 3/9.  Completely stopped long-acting insulin for now, but will resume a lower dose tonight.  Her most recent hemoglobin A1c was 12.4 in January 2022.  Continue Levemir . low sliding scale.  Hemoglobin A1 C 9.7.     Fracture of left distal femur diaphysis extending to the metaphysis with no significant displacement.  The patient had been told to be nonweightbearing at the [facility] nursing facility.  She has not seen orthopedics since the fracture was first discovered at [facility] in January. Dr. Hatfield with ortho saw her. He recommended a knee immobilizer when active and during sleep but may unstrap immobilizer while immobile, throughout the day.  Okay for gentle knee range of motion with PT. Nonweightbearing to left  "lower extremity.     Right rib fractures.  Duo nebs.  Lidocaine patch.  Plain films of right ribs on 3/12 showed multiple rib fractures on the right laterally.  Applying Lidoderm patches.  Make her Norco every 4 hours as needed rather than every 6 hours as needed.  On 1 L of oxygen.     Hypertension/hyperlipidemia/CAD.  Lipitor.  Coreg.  Catapres.  Hydralazine.  Imdur.  Labetalol as needed.     Hyponatremia.  Resolved.      Hyperkalemia. Resolved.      Anemia.  Erythropoietin.  Hemoglobin stable.  No sign of acute bleed.     Pressure wound buttocks region.  Consult wound care.     Depression.  Cymbalta.     Reflux.  Pepcid prophylaxis.  Zofran as needed.     Constipation.  Lactulose.  Senokot.     Pain.  Norco as needed.     Hypothyroidism.  Synthroid.  Elevated TSH.  Low free T4.     Lovenox DVT prophylaxis.     Chronic smoker.  Discussed patient cutting back and stopping.  Offer nicotine patch.  Nicotine patch.     Nutrition . speech consult.  Regular/thin/consistent carb/low potassium diet.  Nova source renal dietary supplement.     Deconditioning.  PT and OT consult.      Blood dsirxqj-jgzyurcwk-yyljphhn stap 1 out of 2 bottles h-probably contamination.  Urine culture-coagulase staph negative-probably contamination . Covid-19-negative.     Vital signs stable, afebrile.  Patient be going to rehab placement at [facility].  Continue with dialysis outpatient.  Follow with rehab physician as soon as able.    Condition on Discharge: Stable.    Physical Exam on Discharge:  /66 (BP Location: Right arm, Patient Position: Lying)   Pulse 77   Temp 98 °F (36.7 °C) (Oral)   Resp 16   Ht 157.5 cm (62\")   Wt 57.8 kg (127 lb 6.4 oz)   SpO2 94%   BMI 23.30 kg/m²   Physical Exam  Vitals and nursing note reviewed.   Constitutional:       Appearance: She is well-developed.   HENT:      Head: Normocephalic.   Eyes:      Conjunctiva/sclera: Conjunctivae normal.      Pupils: Pupils are equal, round, and reactive to " light.   Neck:      Vascular: No JVD.   Cardiovascular:      Rate and Rhythm: Normal rate and regular rhythm.      Heart sounds: Normal heart sounds. No murmur heard.    No friction rub. No gallop.   Pulmonary:      Effort: Pulmonary effort is normal. No respiratory distress.      Breath sounds: Normal breath sounds. No wheezing or rales.      Comments: Diminished breath sound bilateral, clear.  Chest:      Chest wall: No tenderness.   Abdominal:      General: Bowel sounds are normal. There is no distension.      Palpations: Abdomen is soft.      Tenderness: There is no abdominal tenderness. There is no guarding or rebound.   Musculoskeletal:         General: No tenderness or deformity.      Cervical back: Neck supple.      Comments: Left femur fracture.    Nondisplaced fracture.   Skin:     General: Skin is warm and dry.      Capillary Refill: Capillary refill takes 2 to 3 seconds.      Findings: No rash.   Neurological:      General: No focal deficit present.      Mental Status: She is alert and oriented to person, place, and time.      Cranial Nerves: No cranial nerve deficit.      Motor: Weakness present. No abnormal muscle tone.      Coordination: Coordination abnormal.      Gait: Gait abnormal.      Deep Tendon Reflexes: Reflexes normal.   Psychiatric:         Behavior: Behavior normal.         Thought Content: Thought content normal.     Discharge Disposition: Discharge to rehab.    Discharge Medications:     Discharge Medications      New Medications      Instructions Start Date   carvedilol 25 MG tablet  Commonly known as: COREG   25 mg, Oral, 2 Times Daily With Meals      enoxaparin 30 MG/0.3ML solution syringe  Commonly known as: LOVENOX   30 mg, Subcutaneous, Every 24 Hours      insulin detemir 100 UNIT/ML injection  Commonly known as: LEVEMIR   5 Units, Subcutaneous, Nightly      insulin lispro 100 UNIT/ML injection  Commonly known as: humaLOG   2-7 Units, Subcutaneous, Every 4 Hours       ipratropium-albuterol 0.5-2.5 mg/3 ml nebulizer  Commonly known as: DUO-NEB   3 mL, Nebulization, 4 Times Daily - RT      lidocaine 5 %  Commonly known as: LIDODERM   2 patches, Transdermal, Every 24 Hours Scheduled, Remove & Discard patch within 12 hours or as directed by MD   Start Date: March 18, 2022     nicotine 14 MG/24HR patch  Commonly known as: NICODERM CQ   1 patch, Transdermal, Every 24 Hours Scheduled   Start Date: March 18, 2022     NIFEdipine XL 30 MG 24 hr tablet  Commonly known as: PROCARDIA XL   90 mg, Oral, Every 24 Hours Scheduled   Start Date: March 18, 2022     nystatin 789123 UNIT/GM ointment  Commonly known as: MYCOSTATIN   1 application, Topical, Every 12 Hours Scheduled      ondansetron ODT 4 MG disintegrating tablet  Commonly known as: ZOFRAN-ODT   4 mg, Translingual, Every 6 Hours PRN         Changes to Medications      Instructions Start Date   hydrALAZINE 25 MG tablet  Commonly known as: APRESOLINE  What changed:   · medication strength  · how much to take  · when to take this   75 mg, Oral, Every 8 Hours Scheduled      isosorbide mononitrate 60 MG 24 hr tablet  Commonly known as: IMDUR  What changed: Another medication with the same name was removed. Continue taking this medication, and follow the directions you see here.   60 mg, Oral, Every 24 Hours Scheduled   Start Date: March 18, 2022        Continue These Medications      Instructions Start Date   cloNIDine 0.1 MG/24HR patch  Commonly known as: CATAPRES-TTS   1 patch, Transdermal, Weekly      DULoxetine 60 MG capsule  Commonly known as: CYMBALTA   60 mg, Oral, Daily      famotidine 20 MG tablet  Commonly known as: PEPCID   20 mg, Oral, Daily      lactulose 10 GM/15ML solution  Commonly known as: CHRONULAC   10 g, Oral, 3 Times Daily With Meals, For constipation - HOLD for loose stools      levothyroxine 200 MCG tablet  Commonly known as: SYNTHROID, LEVOTHROID   200 mcg, Oral, Daily      senna 8.6 MG tablet  Commonly known as:  SENOKOT   1 tablet, Oral, Daily      simvastatin 20 MG tablet  Commonly known as: ZOCOR   20 mg, Oral, Daily      vitamin D 1.25 MG (59711 UT) capsule capsule  Commonly known as: ERGOCALCIFEROL   50,000 Units, Oral, Every 14 Days         Stop These Medications    acetaminophen 325 MG tablet  Commonly known as: TYLENOL     albuterol sulfate  (90 Base) MCG/ACT inhaler  Commonly known as: PROVENTIL HFA;VENTOLIN HFA;PROAIR HFA     BASAGLAR KWIKPEN 100 UNIT/ML injection pen     insulin aspart 100 UNIT/ML solution pen-injector sc pen  Commonly known as: novoLOG FLEXPEN     minoxidil 2.5 MG tablet  Commonly known as: LONITEN     ondansetron 4 MG tablet  Commonly known as: ZOFRAN            Discharge Diet:   Diet Instructions     Advance Diet As Tolerated            Activity at Discharge:   Activity Instructions     Activity as Tolerated            Discharge Care Plan/Instructions: Discharge to nursing home via ambulance.  Follow with nursing home physician as soon as is able.    Follow-up Appointments:   Follow-up with nursing home physician as soon as able.  Continue dialysis as scheduled.  Follow-up with orthopedics outpatient in about 2 weeks, Dr. Hatfield.    Electronically signed by Narinder Madrid MD, 03/17/22, 11:34 CDT.    Time: Greater than 30 minutes.

## 2022-03-17 NOTE — CASE MANAGEMENT/SOCIAL WORK
Continued Stay Note  [facility]     Patient Name: Ena Upton  MRN: [id number]  Today's Date: 3/17/2022    Admit Date: 3/5/2022     Discharge Plan     Row Name 03/17/22 0933       Plan    Plan [facility] with outpatient HD    Plan Comments Patient has received outpatient HD chair time at [facility], T,T,S at 6:25 am with start date of Saturday, March 19th.  SW spoke with Fannie, admissions for [facility], who is confirming transportation and will advise when they can accept patient.               Discharge Codes    No documentation.                     DUSTIN CampbellW

## 2022-03-17 NOTE — PLAN OF CARE
Goal Outcome Evaluation:  Plan of Care Reviewed With: patient        Progress: improving  Outcome Evaluation: Pt to be dicharged this afternoon.

## 2022-03-17 NOTE — PROGRESS NOTES
Nephrology ([facility]) Progress Note      Patient:  Ena Upton  YOB: 1970  Date of Service: 3/17/2022  MRN: [id number]   Acct: [id number]   Primary Care Physician: Yaron Wiggins APRN  Advance Directive:   Code Status and Medical Interventions:   Ordered at: 03/07/22 0841     Level Of Support Discussed With:    Patient     Code Status (Patient has no pulse and is not breathing):    CPR (Attempt to Resuscitate)     Medical Interventions (Patient has pulse or is breathing):    Full Support     Admit Date: 3/5/2022       Hospital Day: 12  Referring Provider: No Known Provider      Patient personally seen and examined.  Complete chart including Consults, Notes, Operative Reports, Labs, Cardiology, and Radiology studies reviewed as able.        Subjective:  Ena Upton is a 52 y.o. female for whom we were consulted for evaluation and treatment of end stage renal disease. Maintenance hemodialysis on Tuesday Thursday Saturday at [facility].  Frequently skips her dialysis treatments. History of type 2 diabetes, hypertension, hypothyroid. Found down at home on 3/05 and brought to ER via EMS. Initial blood glucose over 900 and patient required intubation. Was on Cardene drip due to hypertension. Extubated on 3/06.  Following IV and PO potassium supplementation, she developed hyperkalemia of 6.6. This was managed medically. Sent to medical floor on 3/07. On 3/08 developed significant hypoglycemia. Ultimately required resumption of dextrose IV fluids to maintain blood glucose. Completed HD on 3/08 without issue. Additional HD on 3/10 & 3/12.  Patient has had imaging which has shown right 4-7th rib fractures secondary to a recent fall as well as a previously documented left distal femur fracture.    Today is feeling a little better, PO pain meds working well.  No new overnight issues.  Seen during dialysis  Dialysis   Pt was seen on RRT. Tolerating  well  Modality: Hemodialysis  Access: Arterial Venous Fistula  Location: left upper  QB: 450  QD: 700  UF: up to 3000 as tolerated      Allergies:  Penicillins, Lamisil [terbinafine], Reglan [metoclopramide], and Stadol [butorphanol]    Home Meds:  Medications Prior to Admission   Medication Sig Dispense Refill Last Dose   • acetaminophen (TYLENOL) 325 MG tablet Take 2 tablets by mouth Every 6 (Six) Hours As Needed for Mild Pain .   Unknown at Unknown time   • albuterol (PROVENTIL HFA;VENTOLIN HFA) 108 (90 BASE) MCG/ACT inhaler Inhale 2 puffs Every 4 (Four) Hours As Needed for wheezing.   Unknown at Unknown time   • cloNIDine (CATAPRES-TTS) 0.1 MG/24HR patch Place 1 patch on the skin as directed by provider 1 (One) Time Per Week.   Unknown at Unknown time   • DULoxetine (CYMBALTA) 60 MG capsule Take 60 mg by mouth daily.   Unknown at Unknown time   • famotidine (PEPCID) 20 MG tablet Take 20 mg by mouth Daily.   Unknown at Unknown time   • hydrALAZINE (APRESOLINE) 50 MG tablet Take 50 mg by mouth 3 (Three) Times a Day.   Unknown at Unknown time   • insulin aspart (novoLOG FLEXPEN) 100 UNIT/ML solution pen-injector sc pen Inject 14 Units under the skin into the appropriate area as directed 3 (Three) Times a Day With Meals.   Unknown at Unknown time   • Insulin Glargine (BASAGLAR KWIKPEN) 100 UNIT/ML injection pen Inject 70 Units under the skin into the appropriate area as directed Every Night.   Unknown at Unknown time   • isosorbide mononitrate (IMDUR) 120 MG 24 hr tablet Take 120 mg by mouth Daily.      • lactulose (CHRONULAC) 10 GM/15ML solution Take 10 g by mouth 3 (Three) Times a Day With Meals. For constipation - HOLD for loose stools      • levothyroxine (SYNTHROID, LEVOTHROID) 200 MCG tablet Take 200 mcg by mouth Daily.   Unknown at Unknown time   • minoxidil (LONITEN) 2.5 MG tablet Take 2.5 mg by mouth Daily.   Unknown at Unknown time   • ondansetron (ZOFRAN) 4 MG tablet Take 4 mg by mouth Every 8 (Eight)  Hours As Needed for Nausea or Vomiting.   Unknown at Unknown time   • senna (senna) 8.6 MG tablet Take 1 tablet by mouth Daily.   Unknown   • simvastatin (ZOCOR) 20 MG tablet Take 20 mg by mouth Daily.   Unknown at Unknown time   • vitamin D (ERGOCALCIFEROL) 1.25 MG (89961 UT) capsule capsule Take 50,000 Units by mouth Every 14 (Fourteen) Days.   Unknown at Unknown time       Medicines:  Current Facility-Administered Medications   Medication Dose Route Frequency Provider Last Rate Last Admin   • acetaminophen (TYLENOL) tablet 650 mg  650 mg Oral Q4H PRN Thomas Oliver DO   650 mg at 03/09/22 1754    Or   • acetaminophen (TYLENOL) 160 MG/5ML solution 650 mg  650 mg Oral Q4H PRN Thomas Oliver DO        Or   • acetaminophen (TYLENOL) suppository 650 mg  650 mg Rectal Q4H PRN Thomas Oliver DO       • atorvastatin (LIPITOR) tablet 20 mg  20 mg Oral Nightly Thomas Oliver DO   20 mg at 03/16/22 2029   • carvedilol (COREG) tablet 25 mg  25 mg Oral BID With Meals Thomas Oliver DO   25 mg at 03/17/22 0821   • cloNIDine (CATAPRES) tablet 0.1 mg  0.1 mg Oral Q12H Thomas Oliver DO   0.1 mg at 03/17/22 0821   • cloNIDine (CATAPRES-TTS) 0.1 MG/24HR patch 1 patch  1 patch Transdermal Weekly Thomas Oliver DO   1 patch at 03/10/22 2058   • dextrose (D50W) (25 g/50 mL) IV injection 12.5 g  12.5 g Intravenous PRN Thomas Oliver DO   12.5 g at 03/06/22 2212   • dextrose (D50W) (25 g/50 mL) IV injection 25 g  25 g Intravenous Q15 Min PRN Thomas Oliver DO   25 g at 03/08/22 0922   • dextrose (GLUTOSE) oral gel 15 g  15 g Oral Q15 Min PRN Thomas Oliver DO   15 g at 03/14/22 0631   • DULoxetine (CYMBALTA) DR capsule 60 mg  60 mg Oral Daily Thomas Oliver DO   60 mg at 03/17/22 0821   • enoxaparin (LOVENOX) syringe 30 mg  30 mg Subcutaneous Q24H Thomas Oliver, DO   30 mg at 03/16/22 1553   • epoetin yolanda-epbx (RETACRIT) injection 10,000 Units  10,000 Units Subcutaneous Once per day on Mon Wed Fri [facility],  Eron Abobtt MD   10,000 Units at 03/16/22 0822   • famotidine (PEPCID) tablet 20 mg  20 mg Oral Daily Thomas Oliver, DO   20 mg at 03/17/22 0820   • glucagon (human recombinant) (GLUCAGEN DIAGNOSTIC) injection 1 mg  1 mg Intramuscular Q15 Min PRN Thomas Oliver, DO   1 mg at 03/14/22 0654   • hydrALAZINE (APRESOLINE) tablet 75 mg  75 mg Oral Q8H Thomas Oliver, DO   75 mg at 03/17/22 0439   • HYDROcodone-acetaminophen (NORCO)  MG per tablet 1 tablet  1 tablet Oral Q4H PRN Thomas Oliver, DO   1 tablet at 03/17/22 0439   • insulin detemir (LEVEMIR) injection 5 Units  5 Units Subcutaneous Nightly Narinder Madrid MD   5 Units at 03/16/22 2031   • insulin lispro (humaLOG) injection 2-7 Units  2-7 Units Subcutaneous Q4H Thomas Oliver, DO   2 Units at 03/16/22 1603   • ipratropium-albuterol (DUO-NEB) nebulizer solution 3 mL  3 mL Nebulization 4x Daily - RT Thomas Oliver, DO   3 mL at 03/17/22 0602   • isosorbide mononitrate (IMDUR) 24 hr tablet 60 mg  60 mg Oral Q24H Thomas Oliver, DO   60 mg at 03/17/22 0821   • labetalol (NORMODYNE,TRANDATE) injection 10 mg  10 mg Intravenous Q4H PRN Thomas Oliver, DO   10 mg at 03/07/22 0411   • lactulose solution 20 g  20 g Oral Daily Thomas Oliver, DO   20 g at 03/17/22 0821   • levothyroxine (SYNTHROID, LEVOTHROID) tablet 200 mcg  200 mcg Oral Q AM Thomas Oliver, DO   200 mcg at 03/17/22 0439   • lidocaine (LIDODERM) 5 % 2 patch  2 patch Transdermal Q24H Thomas Oliver, DO   2 patch at 03/17/22 0821   • nicotine (NICODERM CQ) 14 MG/24HR patch 1 patch  1 patch Transdermal Q24H Thomas Oliver DO   1 patch at 03/17/22 0819   • NIFEdipine XL (PROCARDIA XL) 24 hr tablet 90 mg  90 mg Oral Q24H Thomas Oliver DO   90 mg at 03/17/22 0820   • nystatin (MYCOSTATIN) ointment 1 application  1 application Topical Q12H Narinder Madrid MD   1 application at 03/17/22 0823   • ondansetron (ZOFRAN) injection 4 mg  4 mg Intravenous Q6H PRN Thomas Oliver DO       •  ondansetron ODT (ZOFRAN-ODT) disintegrating tablet 4 mg  4 mg Oral Q6H PRN Thomas Oliver, DO   4 mg at 03/14/22 0823   • senna (SENOKOT) tablet 1 tablet  1 tablet Oral Daily Thomas Oliver DO   1 tablet at 03/17/22 0821   • sodium chloride 0.45 % infusion  50 mL/hr Intravenous Continuous PRN Thomas Oliver, DO       • sodium chloride 0.9 % flush 10 mL  10 mL Intravenous Q12H Thomas Oliver, DO   10 mL at 03/15/22 2032   • sodium chloride 0.9 % flush 10 mL  10 mL Intravenous PRN Thomas Oliver, DO       • sodium chloride 0.9 % infusion  50 mL/hr Intravenous Continuous PRN Thomas Oliver, DO       • sodium chloride 0.9 % infusion  10 mL/hr Intravenous Continuous PRN Thomas Oliver, DO           Past Medical History:  Past Medical History:   Diagnosis Date   • Anxiety    • Arthritis    • Chronic kidney disease     STAGE V RENAL FAILURE   • Depression    • Diabetes mellitus (HCC)    • HTN (hypertension)    • Hypothyroidism    • Obesity    • Tremors of nervous system        Past Surgical History:  Past Surgical History:   Procedure Laterality Date   • ARTERIOVENOUS FISTULA     • BREAST BIOPSY     • CARPAL TUNNEL RELEASE     • CATH LAB PROCEDURE     • CHOLECYSTECTOMY     • TUBAL ABDOMINAL LIGATION         Family History  Family History   Problem Relation Age of Onset   • Cancer Other    • Hypertension Other    • Kidney disease Other    • Heart disease Other    • Diabetes Other    • Diabetes Mother    • Diabetes Maternal Aunt    • Diabetes Maternal Grandmother        Social History  Social History     Socioeconomic History   • Marital status:    Tobacco Use   • Smoking status: Current Every Day Smoker     Packs/day: 0.50   • Smokeless tobacco: Never Used   • Tobacco comment: CUTTING BACK AND TRYING TO STOP SMOKING   Substance and Sexual Activity   • Alcohol use: No   • Drug use: No   • Sexual activity: Defer       Review of Systems:  History obtained from chart review and the patient  General ROS: No fever  or chills  Respiratory ROS: No cough, shortness of breath, wheezing  Cardiovascular ROS: No chest pain or palpitations  Gastrointestinal ROS: No abdominal pain or melena  Genito-Urinary ROS: No dysuria or hematuria  Psych ROS: No anxiety and depression  14 point ROS reviewed with the patient and negative except as noted above and in the HPI unless unable to obtain.    Objective:  Patient Vitals for the past 24 hrs:   BP Temp Temp src Pulse Resp SpO2 Weight   03/17/22 0805 160/66 -- -- 77 16 94 % --   03/17/22 0607 -- -- -- 72 16 -- --   03/17/22 0602 -- -- -- 73 16 99 % --   03/17/22 0435 141/66 98 °F (36.7 °C) Oral 75 16 96 % 57.8 kg (127 lb 6.4 oz)   03/17/22 0004 163/69 98.3 °F (36.8 °C) Oral 79 16 97 % --   03/16/22 2008 -- -- -- 75 16 100 % --   03/16/22 2003 -- -- -- 72 16 97 % --   03/16/22 1949 150/66 98.4 °F (36.9 °C) Oral 76 16 95 % --   03/16/22 1611 160/71 98.4 °F (36.9 °C) Oral 77 16 96 % --   03/16/22 1416 -- -- -- 73 16 -- --   03/16/22 1411 -- -- -- 73 16 100 % --   03/16/22 1132 135/54 97.9 °F (36.6 °C) Oral 75 16 95 % --       Intake/Output Summary (Last 24 hours) at 3/17/2022 1041  Last data filed at 3/17/2022 0805  Gross per 24 hour   Intake 600 ml   Output 75 ml   Net 525 ml     General: awake/alert   Chest:  clear to auscultation bilaterally without respiratory distress  CVS: regular rate and rhythm  Abdominal: soft, nontender, positive bowel sounds  Extremities: no cyanosis or edema  Skin: warm and dry without rash   Neuro: no focal motor deficits      Labs:  Results from last 7 days   Lab Units 03/17/22  0514 03/16/22  0428 03/15/22  0438   WBC 10*3/mm3 5.71 5.65 4.40   HEMOGLOBIN g/dL 8.3* 7.9* 8.0*   HEMATOCRIT % 27.9* 26.8* 27.4*   PLATELETS 10*3/mm3 195 187 203         Results from last 7 days   Lab Units 03/17/22  0514 03/16/22  0428 03/15/22  0438   SODIUM mmol/L 134* 137 133*   POTASSIUM mmol/L 4.9 4.3 5.5*   CHLORIDE mmol/L 95* 98 96*   CO2 mmol/L 26.0 28.0 23.0   BUN mg/dL 42* 31*  50*   CREATININE mg/dL 4.82* 3.88* 5.26*   CALCIUM mg/dL 8.6 8.5* 8.4*   EGFR mL/min/1.73 10.2* 13.3* 9.2*   BILIRUBIN mg/dL  --   --  0.3   ALK PHOS U/L  --   --  106   ALT (SGPT) U/L  --   --  8   AST (SGOT) U/L  --   --  12   GLUCOSE mg/dL 95 180* 88       Radiology:   Imaging Results (Last 72 Hours)     ** No results found for the last 72 hours. **          Culture:  Blood Culture   Date Value Ref Range Status   03/05/2022 Staphylococcus, coagulase negative (C)  Preliminary   03/05/2022 No growth at 24 hours  Preliminary     Urine Culture   Date Value Ref Range Status   03/05/2022 50,000 CFU/mL Staphylococcus, coagulase negative (A)  Final     Comment:     By laboratory criteria, addititional testing is not indicated and unlikely to produce clinically relevant information.  Coagulase negative staphylococci are common skin contaminants, members of the oral lilly,  and of low virulence; requires clinical correlation.           Assessment   1.  End stage renal disease on HD TTS  2.  Type 2 diabetes   3.  Hypertension  4.  Hyperosmolar hyperglycemia--improved  5.  Metabolic acidosis--improved  6.  Metabolic encephalopathy--resolving  7.  Hyponatremia--improved  8.  Anemia of CKD  9.  Secondary hyperparathyroidism  10.  Hyperkalemia--improved    Plan:  1.  Dialysis today  2.  Monitor labs    Delmar Salazar, GAEL  3/17/2022  10:41 CDT

## 2022-03-18 NOTE — THERAPY DISCHARGE NOTE
Acute Care - Occupational Therapy Discharge Summary  [facility]     Patient Name: Ena Upton  : 1970  MRN: [id number]    Today's Date: 3/18/2022                 Admit Date: 3/5/2022        OT Recommendation and Plan    Visit Dx:    ICD-10-CM ICD-9-CM   1. Altered mental status, unspecified altered mental status type  R41.82 780.97   2. Dysphagia, unspecified type  R13.10 787.20   3. Cognitive and behavioral changes  R41.89 799.59    R46.89 312.9   4. Gait abnormality  R26.9 781.2   5. Decreased activities of daily living (ADL)  Z78.9 V49.89                OT Rehab Goals     Row Name 22 0900             Transfer Goal 1 (OT)    Activity/Assistive Device (Transfer Goal 1, OT) transfers, all;toilet;shower chair;tub  -TS      [place] Level/Cues Needed (Transfer Goal 1, OT) standby assist  -TS      Time Frame (Transfer Goal 1, OT) long term goal (LTG);10 days  -TS      Progress/Outcome (Transfer Goal 1, OT) goal not met  -TS              Dressing Goal 1 (OT)    Activity/Device (Dressing Goal 1, OT) lower body dressing  -TS      [place]/Cues Needed (Dressing Goal 1, OT) minimum assist (75% or more patient effort)  -TS      Time Frame (Dressing Goal 1, OT) long term goal (LTG);10 days  -TS      Progress/Outcome (Dressing Goal 1, OT) goal not met  -TS              Toileting Goal 1 (OT)    Activity/Device (Toileting Goal 1, OT) toileting skills, all  -TS      [place] Level/Cues Needed (Toileting Goal 1, OT) minimum assist (75% or more patient effort)  -TS      Time Frame (Toileting Goal 1, OT) long term goal (LTG);10 days  -TS      Progress/Outcome (Toileting Goal 1, OT) goal not met  -TS            User Key  (r) = Recorded By, (t) = Taken By, (c) = Cosigned By    Initials Name Provider Type Discipline    TS Yarelis Martinez COTA Occupational Therapy Assistant OT                 Outcome Measures     Row Name 22 0823             How much help from another is currently needed...     Putting on and taking off regular lower body clothing? 2  -CJ      Bathing (including washing, rinsing, and drying) 2  -CJ      Toileting (which includes using toilet bed pan or urinal) 2  -CJ      Putting on and taking off regular upper body clothing 3  -CJ      Taking care of personal grooming (such as brushing teeth) 4  -CJ      Eating meals 4  -CJ      AM-PAC 6 Clicks Score (OT) 17  -CJ              Functional Assessment    Outcome Measure Options AM-PAC 6 Clicks Daily Activity (OT)  -CJ            User Key  (r) = Recorded By, (t) = Taken By, (c) = Cosigned By    Initials Name Provider Type    Zack Whipple COTA Occupational Therapy Assistant                Timed Therapy Charges  Total Units: 2    Charges  Total Units: 2    Procedure Name Documented Minutes Units Code    HC OT THER PROC EA 15 MIN 28  2    88542 (CPT®)               Documented Minutes  Total Minutes: 28    Therapy Provided Minutes    68318 - OT Therapeutic Exercise Minutes 28                    OT Discharge Summary  Anticipated Discharge Disposition (OT): skilled nursing facility  Reason for Discharge: Discharge from facility  Outcomes Achieved: Refer to plan of care for updates on goals achieved  Discharge Destination: SNF      RACHEL Austin  3/18/2022    DISPLAY PLAN FREE TEXT DISPLAY PLAN FREE TEXT DISPLAY PLAN FREE TEXT DISPLAY PLAN FREE TEXT DISPLAY PLAN FREE TEXT

## 2022-03-18 NOTE — THERAPY DISCHARGE NOTE
Acute Care - Physical Therapy Discharge Summary  [facility]       Patient Name: Ena Upton  : 1970  MRN: [id number]    Today's Date: 3/18/2022                 Admit Date: 3/5/2022      PT Recommendation and Plan    Visit Dx:    ICD-10-CM ICD-9-CM   1. Altered mental status, unspecified altered mental status type  R41.82 780.97   2. Dysphagia, unspecified type  R13.10 787.20   3. Cognitive and behavioral changes  R41.89 799.59    R46.89 312.9   4. Gait abnormality  R26.9 781.2   5. Decreased activities of daily living (ADL)  Z78.9 V49.89        Outcome Measures     Row Name 22 0823             How much help from another is currently needed...    Putting on and taking off regular lower body clothing? 2  -CJ      Bathing (including washing, rinsing, and drying) 2  -CJ      Toileting (which includes using toilet bed pan or urinal) 2  -CJ      Putting on and taking off regular upper body clothing 3  -CJ      Taking care of personal grooming (such as brushing teeth) 4  -CJ      Eating meals 4  -CJ      AM-PAC 6 Clicks Score (OT) 17  -CJ              Functional Assessment    Outcome Measure Options AM-PAC 6 Clicks Daily Activity (OT)  -CJ            User Key  (r) = Recorded By, (t) = Taken By, (c) = Cosigned By    Initials Name Provider Type     Zack Tafoya COTA Occupational Therapy Assistant                     PT Rehab Goals     Row Name 22 0849             Bed Mobility Goal 1 (PT)    Activity/Assistive Device (Bed Mobility Goal 1, PT) bed mobility activities, all  -AB      [place] Level/Cues Needed (Bed Mobility Goal 1, PT) independent  -AB      Time Frame (Bed Mobility Goal 1, PT) long term goal (LTG);10 days  -AB      Progress/Outcomes (Bed Mobility Goal 1, PT) goal not met  -AB              Transfer Goal 1 (PT)    Activity/Assistive Device (Transfer Goal 1, PT) sit-to-stand/stand-to-sit;bed-to-chair/chair-to-bed;walker, rolling  -AB      [place] Level/Cues Needed (Transfer  Goal 1, PT) contact guard required;standby assist  -AB      Time Frame (Transfer Goal 1, PT) long term goal (LTG);10 days  -AB      Strategies/Barriers (Transfers Goal 1, PT) maintaining NWB L LE  -AB      Progress/Outcome (Transfer Goal 1, PT) goal not met  -AB              Stairs Goal 1 (PT)    Activity/Assistive Device (Stairs Goal 1, PT) ascending stairs;descending stairs;using handrail, left;using handrail, right;step-to-step;decrease fall risk;improve balance and speed;maintain weight-bearing status  -AB      [place] Level/Cues Needed (Stairs Goal 1, PT) minimum assist (75% or more patient effort)  -AB      Number of Stairs (Stairs Goal 1, PT) 4  -AB      Time Frame (Stairs Goal 1, PT) long term goal (LTG);10 days  -AB      Progress/Outcome (Stairs Goal 1, PT) goal not met  -AB              Problem Specific Goal 1 (PT)    Problem Specific Goal 1 (PT) I w/ wheelchair mobility/mechanics functional distances  -AB      Time Frame (Problem Specific Goal 1, PT) long-term goal (LTG);other (see comments)  10 days  -AB      Progress/Outcome (Problem Specific Goal 1, PT) goal not met  -AB              Patient Education Goal (PT)    Activity (Patient Education Goal, PT) pt I w/ mgmt of knee immobilizer  -AB      [place]/Cues/Accuracy (Memory Goal 2, PT) demonstrates adequately;independent;verbalizes understanding  -AB      Time Frame (Patient Education Goal, PT) long term goal (LTG);10 days  -AB      Progress/Outcome (Patient Education Goal, PT) goal not met  -AB            User Key  (r) = Recorded By, (t) = Taken By, (c) = Cosigned By    Initials Name Provider Type Discipline    AB Kalee Bethea, PTA Physical Therapy Assistant PT                    PT Discharge Summary  Anticipated Discharge Disposition (PT): sub acute care setting, skilled nursing facility, home with 24/7 care, home with home health  Reason for Discharge: Discharge from facility  Outcomes Achieved: Refer to plan of care for updates on  goals achieved  Discharge Destination: SNF      Kalee Bethea, PTA   3/18/2022

## 2022-04-01 ENCOUNTER — APPOINTMENT (OUTPATIENT)
Dept: GENERAL RADIOLOGY | Age: 52
End: 2022-04-01
Payer: MEDICARE

## 2022-04-01 ENCOUNTER — HOSPITAL ENCOUNTER (OUTPATIENT)
Age: 52
Setting detail: OBSERVATION
Discharge: HOME | End: 2022-04-05
Attending: EMERGENCY MEDICINE | Admitting: INTERNAL MEDICINE
Payer: MEDICARE

## 2022-04-01 DIAGNOSIS — R73.9 HYPERGLYCEMIA: ICD-10-CM

## 2022-04-01 DIAGNOSIS — N18.6 END STAGE RENAL DISEASE (HCC): ICD-10-CM

## 2022-04-01 DIAGNOSIS — R77.8 ELEVATED TROPONIN: ICD-10-CM

## 2022-04-01 DIAGNOSIS — E87.70 HYPERVOLEMIA, UNSPECIFIED HYPERVOLEMIA TYPE: ICD-10-CM

## 2022-04-01 DIAGNOSIS — I10 UNCONTROLLED HYPERTENSION: ICD-10-CM

## 2022-04-01 DIAGNOSIS — E87.5 HYPERKALEMIA: Primary | ICD-10-CM

## 2022-04-01 LAB
ALBUMIN SERPL-MCNC: 3.1 G/DL (ref 3.5–5.2)
ALP BLD-CCNC: 109 U/L (ref 35–104)
ALT SERPL-CCNC: 5 U/L (ref 5–33)
ANION GAP SERPL CALCULATED.3IONS-SCNC: 13 MMOL/L (ref 7–19)
AST SERPL-CCNC: 14 U/L (ref 5–32)
BASE EXCESS VENOUS: -1 MMOL/L
BILIRUB SERPL-MCNC: <0.2 MG/DL (ref 0.2–1.2)
BUN BLDV-MCNC: 45 MG/DL (ref 6–20)
CALCIUM SERPL-MCNC: 7.9 MG/DL (ref 8.6–10)
CARBOXYHEMOGLOBIN: 3 %
CHLORIDE BLD-SCNC: 92 MMOL/L (ref 98–111)
CO2: 22 MMOL/L (ref 22–29)
CREAT SERPL-MCNC: 5.9 MG/DL (ref 0.5–0.9)
EKG P AXIS: 58 DEGREES
EKG P-R INTERVAL: 144 MS
EKG Q-T INTERVAL: 408 MS
EKG QRS DURATION: 90 MS
EKG QTC CALCULATION (BAZETT): 425 MS
EKG T AXIS: 100 DEGREES
GFR AFRICAN AMERICAN: 9
GFR NON-AFRICAN AMERICAN: 7
GLUCOSE BLD-MCNC: 117 MG/DL (ref 74–109)
GLUCOSE BLD-MCNC: 617 MG/DL (ref 74–109)
HCO3 VENOUS: 24 MMOL/L (ref 23–29)
HCT VFR BLD CALC: 25.5 % (ref 37–47)
HEMOGLOBIN: 7.8 G/DL (ref 12–16)
MCH RBC QN AUTO: 28.3 PG (ref 27–31)
MCHC RBC AUTO-ENTMCNC: 30.6 G/DL (ref 33–37)
MCV RBC AUTO: 92.4 FL (ref 81–99)
METHEMOGLOBIN VENOUS: 0.5 %
O2 CONTENT, VEN: 10 ML/DL
O2 SAT, VEN: 86 %
PCO2, VEN: 42 MMHG (ref 40–50)
PDW BLD-RTO: 18.1 % (ref 11.5–14.5)
PH VENOUS: 7.37 (ref 7.35–7.45)
PLATELET # BLD: 136 K/UL (ref 130–400)
PMV BLD AUTO: 10.2 FL (ref 9.4–12.3)
PO2, VEN: 61 MMHG
POTASSIUM REFLEX MAGNESIUM: 5.7 MMOL/L (ref 3.5–5)
PRO-BNP: ABNORMAL PG/ML (ref 0–900)
RBC # BLD: 2.76 M/UL (ref 4.2–5.4)
SARS-COV-2, NAAT: NOT DETECTED
SODIUM BLD-SCNC: 127 MMOL/L (ref 136–145)
T4 FREE: 1.19 NG/DL (ref 0.93–1.7)
TOTAL PROTEIN: 5.5 G/DL (ref 6.6–8.7)
TROPONIN: 0.19 NG/ML (ref 0–0.03)
TSH REFLEX FT4: 15.26 UIU/ML (ref 0.35–5.5)
WBC # BLD: 3.9 K/UL (ref 4.8–10.8)

## 2022-04-01 PROCEDURE — 93010 ELECTROCARDIOGRAM REPORT: CPT | Performed by: INTERNAL MEDICINE

## 2022-04-01 PROCEDURE — 6360000002 HC RX W HCPCS: Performed by: EMERGENCY MEDICINE

## 2022-04-01 PROCEDURE — 36415 COLL VENOUS BLD VENIPUNCTURE: CPT

## 2022-04-01 PROCEDURE — 85027 COMPLETE CBC AUTOMATED: CPT

## 2022-04-01 PROCEDURE — 87635 SARS-COV-2 COVID-19 AMP PRB: CPT

## 2022-04-01 PROCEDURE — 1210000000 HC MED SURG R&B

## 2022-04-01 PROCEDURE — 83880 ASSAY OF NATRIURETIC PEPTIDE: CPT

## 2022-04-01 PROCEDURE — G0378 HOSPITAL OBSERVATION PER HR: HCPCS

## 2022-04-01 PROCEDURE — 8010000000 HC HEMODIALYSIS ACUTE INPT

## 2022-04-01 PROCEDURE — 71045 X-RAY EXAM CHEST 1 VIEW: CPT

## 2022-04-01 PROCEDURE — 6370000000 HC RX 637 (ALT 250 FOR IP): Performed by: INTERNAL MEDICINE

## 2022-04-01 PROCEDURE — 96372 THER/PROPH/DIAG INJ SC/IM: CPT

## 2022-04-01 PROCEDURE — 80053 COMPREHEN METABOLIC PANEL: CPT

## 2022-04-01 PROCEDURE — 82803 BLOOD GASES ANY COMBINATION: CPT

## 2022-04-01 PROCEDURE — 82947 ASSAY GLUCOSE BLOOD QUANT: CPT

## 2022-04-01 PROCEDURE — 36600 WITHDRAWAL OF ARTERIAL BLOOD: CPT

## 2022-04-01 PROCEDURE — 6370000000 HC RX 637 (ALT 250 FOR IP): Performed by: EMERGENCY MEDICINE

## 2022-04-01 PROCEDURE — 96374 THER/PROPH/DIAG INJ IV PUSH: CPT

## 2022-04-01 PROCEDURE — 99284 EMERGENCY DEPT VISIT MOD MDM: CPT

## 2022-04-01 PROCEDURE — 2700000000 HC OXYGEN THERAPY PER DAY

## 2022-04-01 PROCEDURE — 84484 ASSAY OF TROPONIN QUANT: CPT

## 2022-04-01 PROCEDURE — 84439 ASSAY OF FREE THYROXINE: CPT

## 2022-04-01 PROCEDURE — 84443 ASSAY THYROID STIM HORMONE: CPT

## 2022-04-01 PROCEDURE — 6360000002 HC RX W HCPCS: Performed by: INTERNAL MEDICINE

## 2022-04-01 PROCEDURE — 93005 ELECTROCARDIOGRAM TRACING: CPT | Performed by: EMERGENCY MEDICINE

## 2022-04-01 RX ORDER — HYDRALAZINE HYDROCHLORIDE 50 MG/1
100 TABLET, FILM COATED ORAL EVERY 8 HOURS SCHEDULED
Status: DISCONTINUED | OUTPATIENT
Start: 2022-04-01 | End: 2022-04-05 | Stop reason: HOSPADM

## 2022-04-01 RX ORDER — CLONIDINE HYDROCHLORIDE 0.1 MG/1
0.1 TABLET ORAL
Status: COMPLETED | OUTPATIENT
Start: 2022-04-01 | End: 2022-04-01

## 2022-04-01 RX ORDER — NIFEDIPINE 90 MG/1
90 TABLET, EXTENDED RELEASE ORAL DAILY
Status: DISCONTINUED | OUTPATIENT
Start: 2022-04-01 | End: 2022-04-05 | Stop reason: HOSPADM

## 2022-04-01 RX ORDER — METOPROLOL SUCCINATE 50 MG/1
100 TABLET, EXTENDED RELEASE ORAL
Status: DISCONTINUED | OUTPATIENT
Start: 2022-04-02 | End: 2022-04-05 | Stop reason: HOSPADM

## 2022-04-01 RX ORDER — HEPARIN SODIUM 5000 [USP'U]/ML
5000 INJECTION, SOLUTION INTRAVENOUS; SUBCUTANEOUS EVERY 8 HOURS SCHEDULED
Status: DISCONTINUED | OUTPATIENT
Start: 2022-04-01 | End: 2022-04-05 | Stop reason: HOSPADM

## 2022-04-01 RX ORDER — ACETAMINOPHEN 650 MG/1
650 SUPPOSITORY RECTAL EVERY 6 HOURS PRN
Status: DISCONTINUED | OUTPATIENT
Start: 2022-04-01 | End: 2022-04-05 | Stop reason: HOSPADM

## 2022-04-01 RX ORDER — ROPINIROLE 2 MG/1
4 TABLET, FILM COATED ORAL NIGHTLY
Status: DISCONTINUED | OUTPATIENT
Start: 2022-04-01 | End: 2022-04-05 | Stop reason: HOSPADM

## 2022-04-01 RX ORDER — LACTULOSE 10 G/15ML
15 SOLUTION ORAL 3 TIMES DAILY
Status: DISCONTINUED | OUTPATIENT
Start: 2022-04-01 | End: 2022-04-05 | Stop reason: HOSPADM

## 2022-04-01 RX ORDER — ASPIRIN 325 MG
325 TABLET ORAL ONCE
Status: COMPLETED | OUTPATIENT
Start: 2022-04-01 | End: 2022-04-01

## 2022-04-01 RX ORDER — SENNA PLUS 8.6 MG/1
1 TABLET ORAL DAILY
Status: DISCONTINUED | OUTPATIENT
Start: 2022-04-01 | End: 2022-04-05 | Stop reason: HOSPADM

## 2022-04-01 RX ORDER — INSULIN GLARGINE 100 [IU]/ML
5 INJECTION, SOLUTION SUBCUTANEOUS NIGHTLY
Status: DISCONTINUED | OUTPATIENT
Start: 2022-04-01 | End: 2022-04-05 | Stop reason: HOSPADM

## 2022-04-01 RX ORDER — ALBUTEROL SULFATE 90 UG/1
2 AEROSOL, METERED RESPIRATORY (INHALATION) EVERY 4 HOURS PRN
Status: DISCONTINUED | OUTPATIENT
Start: 2022-04-01 | End: 2022-04-01 | Stop reason: CLARIF

## 2022-04-01 RX ORDER — CLONIDINE 0.1 MG/24H
1 PATCH, EXTENDED RELEASE TRANSDERMAL WEEKLY
Status: DISCONTINUED | OUTPATIENT
Start: 2022-04-01 | End: 2022-04-05 | Stop reason: HOSPADM

## 2022-04-01 RX ORDER — ERGOCALCIFEROL 1.25 MG/1
50000 CAPSULE ORAL
Status: DISCONTINUED | OUTPATIENT
Start: 2022-04-12 | End: 2022-04-05 | Stop reason: HOSPADM

## 2022-04-01 RX ORDER — HYDRALAZINE HYDROCHLORIDE 20 MG/ML
5 INJECTION INTRAMUSCULAR; INTRAVENOUS ONCE
Status: COMPLETED | OUTPATIENT
Start: 2022-04-01 | End: 2022-04-01

## 2022-04-01 RX ORDER — ACETAMINOPHEN 325 MG/1
650 TABLET ORAL EVERY 6 HOURS PRN
Status: DISCONTINUED | OUTPATIENT
Start: 2022-04-01 | End: 2022-04-05 | Stop reason: HOSPADM

## 2022-04-01 RX ORDER — ONDANSETRON 2 MG/ML
4 INJECTION INTRAMUSCULAR; INTRAVENOUS EVERY 6 HOURS PRN
Status: DISCONTINUED | OUTPATIENT
Start: 2022-04-01 | End: 2022-04-05 | Stop reason: HOSPADM

## 2022-04-01 RX ORDER — ISOSORBIDE MONONITRATE 60 MG/1
120 TABLET, EXTENDED RELEASE ORAL DAILY
Status: DISCONTINUED | OUTPATIENT
Start: 2022-04-01 | End: 2022-04-05 | Stop reason: HOSPADM

## 2022-04-01 RX ORDER — LISINOPRIL 20 MG/1
20 TABLET ORAL 2 TIMES DAILY
Status: DISCONTINUED | OUTPATIENT
Start: 2022-04-01 | End: 2022-04-05 | Stop reason: HOSPADM

## 2022-04-01 RX ORDER — SODIUM CHLORIDE 9 MG/ML
INJECTION, SOLUTION INTRAVENOUS PRN
Status: DISCONTINUED | OUTPATIENT
Start: 2022-04-01 | End: 2022-04-05 | Stop reason: HOSPADM

## 2022-04-01 RX ORDER — ALBUTEROL SULFATE 2.5 MG/3ML
2.5 SOLUTION RESPIRATORY (INHALATION) EVERY 4 HOURS PRN
Status: DISCONTINUED | OUTPATIENT
Start: 2022-04-01 | End: 2022-04-05 | Stop reason: HOSPADM

## 2022-04-01 RX ORDER — NICOTINE 21 MG/24HR
1 PATCH, TRANSDERMAL 24 HOURS TRANSDERMAL DAILY
Status: DISCONTINUED | OUTPATIENT
Start: 2022-04-01 | End: 2022-04-05 | Stop reason: HOSPADM

## 2022-04-01 RX ORDER — DULOXETIN HYDROCHLORIDE 30 MG/1
60 CAPSULE, DELAYED RELEASE ORAL DAILY
Status: DISCONTINUED | OUTPATIENT
Start: 2022-04-01 | End: 2022-04-05 | Stop reason: HOSPADM

## 2022-04-01 RX ORDER — LEVOTHYROXINE SODIUM 0.07 MG/1
150 TABLET ORAL DAILY
Status: DISCONTINUED | OUTPATIENT
Start: 2022-04-02 | End: 2022-04-05 | Stop reason: HOSPADM

## 2022-04-01 RX ORDER — ATORVASTATIN CALCIUM 20 MG/1
20 TABLET, FILM COATED ORAL DAILY
Status: DISCONTINUED | OUTPATIENT
Start: 2022-04-01 | End: 2022-04-05 | Stop reason: HOSPADM

## 2022-04-01 RX ORDER — ERGOCALCIFEROL 1.25 MG/1
50000 CAPSULE ORAL
Status: DISCONTINUED | OUTPATIENT
Start: 2022-04-01 | End: 2022-04-01

## 2022-04-01 RX ORDER — SEVELAMER CARBONATE 800 MG/1
800 TABLET, FILM COATED ORAL
Status: DISCONTINUED | OUTPATIENT
Start: 2022-04-01 | End: 2022-04-05 | Stop reason: HOSPADM

## 2022-04-01 RX ORDER — ONDANSETRON 4 MG/1
4 TABLET, ORALLY DISINTEGRATING ORAL EVERY 8 HOURS PRN
Status: DISCONTINUED | OUTPATIENT
Start: 2022-04-01 | End: 2022-04-05 | Stop reason: HOSPADM

## 2022-04-01 RX ORDER — SODIUM CHLORIDE 0.9 % (FLUSH) 0.9 %
10 SYRINGE (ML) INJECTION EVERY 12 HOURS SCHEDULED
Status: DISCONTINUED | OUTPATIENT
Start: 2022-04-01 | End: 2022-04-05 | Stop reason: HOSPADM

## 2022-04-01 RX ORDER — SODIUM CHLORIDE 0.9 % (FLUSH) 0.9 %
10 SYRINGE (ML) INJECTION PRN
Status: DISCONTINUED | OUTPATIENT
Start: 2022-04-01 | End: 2022-04-05 | Stop reason: HOSPADM

## 2022-04-01 RX ADMIN — ASPIRIN 325 MG: 325 TABLET ORAL at 13:58

## 2022-04-01 RX ADMIN — HEPARIN SODIUM 5000 UNITS: 5000 INJECTION INTRAVENOUS; SUBCUTANEOUS at 20:38

## 2022-04-01 RX ADMIN — ACETAMINOPHEN 650 MG: 325 TABLET ORAL at 20:37

## 2022-04-01 RX ADMIN — ISOSORBIDE MONONITRATE 120 MG: 60 TABLET, EXTENDED RELEASE ORAL at 20:37

## 2022-04-01 RX ADMIN — HYDRALAZINE HYDROCHLORIDE 5 MG: 20 INJECTION, SOLUTION INTRAMUSCULAR; INTRAVENOUS at 13:52

## 2022-04-01 RX ADMIN — INSULIN LISPRO 10 UNITS: 100 INJECTION, SOLUTION INTRAVENOUS; SUBCUTANEOUS at 13:58

## 2022-04-01 RX ADMIN — SEVELAMER CARBONATE 800 MG: 800 TABLET, FILM COATED ORAL at 20:36

## 2022-04-01 RX ADMIN — SENNOSIDES 8.6 MG: 8.6 TABLET, COATED ORAL at 20:36

## 2022-04-01 RX ADMIN — DULOXETINE 60 MG: 30 CAPSULE, DELAYED RELEASE ORAL at 20:37

## 2022-04-01 RX ADMIN — NIFEDIPINE 90 MG: 90 TABLET, FILM COATED, EXTENDED RELEASE ORAL at 20:36

## 2022-04-01 RX ADMIN — HYDRALAZINE HYDROCHLORIDE 100 MG: 50 TABLET, FILM COATED ORAL at 20:35

## 2022-04-01 RX ADMIN — ROPINIROLE HYDROCHLORIDE 4 MG: 2 TABLET, FILM COATED ORAL at 20:37

## 2022-04-01 RX ADMIN — CLONIDINE HYDROCHLORIDE 0.1 MG: 0.1 TABLET ORAL at 18:17

## 2022-04-01 RX ADMIN — ATORVASTATIN CALCIUM 20 MG: 20 TABLET, FILM COATED ORAL at 21:06

## 2022-04-01 RX ADMIN — LISINOPRIL 20 MG: 20 TABLET ORAL at 20:37

## 2022-04-01 ASSESSMENT — PAIN SCALES - GENERAL: PAINLEVEL_OUTOF10: 3

## 2022-04-01 ASSESSMENT — ENCOUNTER SYMPTOMS
DIARRHEA: 0
VOMITING: 0
SHORTNESS OF BREATH: 1
ABDOMINAL PAIN: 0
EYE PAIN: 0

## 2022-04-01 NOTE — PROGRESS NOTES
Results for Sangita Claire (MRN [id number]) as of 4/1/2022 13:21   Ref.  Range 4/1/2022 13:19   pH, Manish Latest Ref Range: 7.35 - 7.45  7.37   pCO2, Manish Latest Ref Range: 40.0 - 50.0 mmHg 42.0   pO2, Manish Latest Ref Range: Not Established mmHg 61   HCO3, Venous Latest Ref Range: 23 - 29 mmol/L 24   Base Excess, Manish Latest Ref Range: Not Established mmol/L -1   O2 Content, Manish Latest Ref Range: Not Established mL/dL 10   MetHgb, Manish Latest Ref Range: <1.5 % 0.5   O2 Sat, Manish Latest Ref Range: Not Established % 86   Venous blood gas

## 2022-04-01 NOTE — PROGRESS NOTES
AVS reviewed with patient, all questions answered. Patient signed copy, placed in chart.    [place] boot form signed and placed in chart

## 2022-04-01 NOTE — ED PROVIDER NOTES
[facility] EMERGENCY DEPT  eMERGENCY dEPARTMENT eNCOUnter      Pt Name: Helena Luu  MRN: [id number]  [place] 1970  Date of evaluation: 4/1/2022  Provider: Abdelrahman Gilmore MD    [address] [place] [address]       Chief Complaint   Patient presents with    Hyperglycemia     SNF not giving insulin, accu check HI given 16 units of insulin.  Other     missed dialysis, fluid overload         HISTORY OF PRESENT ILLNESS   (Location/Symptom, Timing/Onset,Context/Setting, Quality, Duration, Modifying Factors, Severity)  Note limiting factors. Helena Luu is a 47 y.o. female who presents to the emergency department with multiple complaints. Patient nursing home patient. Dialysis Tuesday Thursday Saturday. Missed dialysis yesterday. Patient said that she did not go to dialysis because nursing home said it was not scheduled appropriately. Patient said that she was not given her insulin today and blood sugar was found to be high. She says she was given insulin just before being transferred from nursing home. Her only complaint is feeling short of breath. She said this started yesterday since missing her dialysis and has gradually worsened a little bit since. No chest pain. No hemoptysis. No cough. No fever. No unilateral leg swelling or pain. Feels like both legs are little swollen since missing dialysis. No history of DVT or PE. No abdominal pain nausea vomiting diarrhea. No vision changes numbness or weakness. HPI    NursingNotes were reviewed. REVIEW OF SYSTEMS    (2-9 systems for level 4, 10 or more for level 5)     Review of Systems   Constitutional: Negative for fever. Eyes: Negative for pain. Respiratory: Positive for shortness of breath. Cardiovascular: Positive for leg swelling (mild since missing dialysis). Negative for chest pain and palpitations. Gastrointestinal: Negative for abdominal pain, diarrhea and vomiting. Genitourinary: Negative for dysuria. Skin: Negative for rash. Neurological: Negative for weakness and headaches. All other systems reviewed and are negative. A complete review of systems was performed and is negative except as noted above in the HPI. PAST MEDICAL HISTORY     Past Medical History:   Diagnosis Date    Arthritis     Chronic kidney disease, stage 5, kidney failure [address]     Closed fracture of right shoulder girdle, with routine healing, subsequent encounter     DM (diabetes mellitus) type II controlled with renal manifestation ([facility] 06/1993    Gastroparesis     GERD (gastroesophageal reflux disease)     Hemodialysis patient ([facility]     dialysis on tues, thur, and sat [facility]    Hypertension     Hypothyroid     Nasal congestion     recent    Neuropathy     Noncompliance with medications     Palliative care patient 10/08/2019    Restless leg syndrome          SURGICAL HISTORY       Past Surgical History:   Procedure Laterality Date    BREAST SURGERY Left 2008    infected milk gland removed    BREAST SURGERY Right 5/25/2021    WEDGE EXCISION RIGHT BREAST DUCT WITH LACRIMAL DUCT PROBE, PEC BLOCK performed by Nabil Schrader MD at [facility]      2008    COLONOSCOPY Left 9/17/2020    Dr Omari Lovelace hepatic flexure-Severe tortuosity with severe spasming, 1 yr recall    DIALYSIS FISTULA CREATION Left 1/25/2019    REVISION LEFT UPPER EXTREMITY AV ACCESS WITH LEFT BRACHIAL ARTERY TO LEFT AXILLARY VEIN WITH  INTERPOSITIONAL ARTEGRAFT   performed by Alireza Arellano MD at [address]  2012    [address] Right 1/25/2019    INSERTION OF RIGHT INTERNAL JUGULAR HEMODIALYSIS CATHETER performed by Alireza Arellano MD at [facility]  08/17/2018    1.  Moderately increased stainable iron identified by special stain in Kupffer cells and occasional hepatocytes. Negative for evidence of significant portal or lobular inflammation.  Negative for evidence of significant fibrosis    OH COLONOSCOPY W/BIOPSY SINGLE/MULTIPLE N/A 10/20/2017    Dr Karl Partida prep-Tubular AP (-) dysplasia x 2--3 yr recall    [place] EGD TRANSORAL BIOPSY SINGLE/MULTIPLE N/A 12/27/2016    Dr Bindu Beth EGD TRANSORAL BIOPSY SINGLE/MULTIPLE N/A 10/20/2017    Dr Felix Lott (-)    TUBAL LIGATION      1993    VASCULAR SURGERY Left 2016    fistula by Dr Erica Watkins at [address]  10/02/2017    [facility]. Left upper fistulograms/venograms, balloon angioplasty cephalic vein arch [id number] conquest.    VASCULAR SURGERY  08/2018    FISTULOGRAM    VASCULAR SURGERY  10/29/2018    [facility]. Left upper fistulograms/venograms    VASCULAR SURGERY  12/19/2018    [facility]. Arch aortogram,left upper arteriograms.  VASCULAR SURGERY  03/06/2019    [facility]. Removal of tunneled dialyis catheter right internal jugular vein.  VASCULAR SURGERY  08/28/2019    [facility]. Left upper extremity fistulogram including venography to the superior vena cava. Balloon angioplasty left subclavian vein with 10mm x 40mm conquest balloon. Balloon angioplasty mid/proximal upper arm cephalic vein with 75AN x 40mm conquest balloon. Venograms after balloon angioplasty. Stent left mid/proximal upper arm cephalic vein stenosis fluency 10mm x 60mm self-expanding covered stent. Balloon     VASCULAR SURGERY  08/28/2019    cont angioplasty stent with 10mm x 40mm conquest balloon. Completion venograms left upper extremity.          CURRENT MEDICATIONS       Previous Medications    ALBUTEROL SULFATE  (90 BASE) MCG/ACT INHALER    Inhale 2 puffs into the lungs every 4 hours as needed    CLONIDINE (CATAPRES) 0.1 MG/24HR PTWK    Place 1 patch onto the skin once a week    DULOXETINE (CYMBALTA) 60 MG EXTENDED RELEASE CAPSULE    Take 1 capsule by mouth daily Hold Cymbalta while taking Zyvox Antibiotic    ERGOCALCIFEROL (VITAMIN D2) 10 MCG (400 UNIT) TABS    Take 1.25 mg by mouth every 14 days    HYDRALAZINE (APRESOLINE) 100 MG TABLET    Take 1 tablet by mouth every 8 hours    INSULIN ASPART (NOVOLOG FLEXPEN) 100 UNIT/ML INJECTION PEN    Inject 4 Units into the skin 3 times daily (before meals) Indications: Diabetes    INSULIN GLARGINE (BASAGLAR KWIKPEN) 100 UNIT/ML INJECTION PEN    Inject 5 Units into the skin nightly Indications: Diabetes    ISOSORBIDE MONONITRATE (IMDUR) 120 MG EXTENDED RELEASE TABLET    Take 1 tablet by mouth daily    LACTULOSE (CHRONULAC) 10 GM/15ML SOLUTION    Take 15 g by mouth 3 times daily With meals for constipation, hold for loose stools    LEVOTHYROXINE (SYNTHROID) 150 MCG TABLET    Take 150 mcg by mouth Daily    LISINOPRIL (PRINIVIL;ZESTRIL) 20 MG TABLET    Take 1 tablet by mouth 2 times daily    METOPROLOL SUCCINATE (TOPROL XL) 100 MG EXTENDED RELEASE TABLET    Take 1 tablet by mouth every morning (before breakfast)    NICOTINE (NICODERM CQ) 14 MG/24HR    Place 1 patch onto the skin daily    NIFEDIPINE (PROCARDIA XL) 30 MG EXTENDED RELEASE TABLET    Take 3 tablets by mouth daily    ROPINIROLE (REQUIP) 2 MG TABLET    Take 4 mg by mouth nightly Indications: Restless Leg Syndrome     SENNA (SENNA-LAX) 8.6 MG TABLET    Take 1 tablet by mouth daily    SEVELAMER (RENVELA) 800 MG TABLET    Take 1 tablet by mouth 3 times daily (with meals)    SIMVASTATIN (ZOCOR) 40 MG TABLET    Take 40 mg by mouth nightly        ALLERGIES     Penicillins, Lamisil advanced [terbinafine], Reglan [metoclopramide], and Stadol [butorphanol]    FAMILY HISTORY       Family History   Problem Relation Age of Onset    Colon Cancer Mother          at 61    Colon Polyps Mother     Lung Cancer Father          at 66    Colon Polyps Father     Stomach Cancer Sister     Breast Cancer Sister 27    Depression Daughter 25        committed suicide    Liver Cancer Neg Hx     Liver Disease Neg Hx     Esophageal Cancer Neg Hx     Rectal Cancer Neg Hx           SOCIAL HISTORY       Social History     Socioeconomic History    Marital status: Single     Spouse name: None    Number of children: 2    Years of education: None    Highest education level: None   Occupational History    None   Tobacco Use    Smoking status: Current Every Day Smoker     Packs/day: 0.50     Years: 33.00     Pack years: 16.50     Types: Cigarettes    Smokeless tobacco: Never Used    Tobacco comment: NOW at 1/4 PD, started at age 24 and has averaged 2 PPD   Vaping Use    Vaping Use: Never used   Substance and Sexual Activity    Alcohol use: No    Drug use: Not Currently     Types: Marijuana Garon Kettle)    Sexual activity: Not Currently     Partners: Male   Other Topics Concern    None   Social History Narrative    None     Social Determinants of Health     Financial Resource Strain:     Difficulty of Paying Living Expenses: Not on file   Food Insecurity:     Worried About Running Out of Food in the Last Year: Not on file    Kendy of Food in the Last Year: Not on file   Transportation Needs:     Lack of Transportation (Medical): Not on file    Lack of Transportation (Non-Medical):  Not on file   Physical Activity:     Days of Exercise per Week: Not on file    Minutes of Exercise per Session: Not on file   Stress:     Feeling of Stress : Not on file   Social Connections:     Frequency of Communication with Friends and Family: Not on file    Frequency of Social Gatherings with Friends and Family: Not on file    Attends Gnosticism Services: Not on file    Active Member of [address] [place] [address] or Organizations: Not on file    Attends Club or Organization Meetings: Not on file    Marital Status: Not on file   Intimate Partner Violence:     Fear of Current or Ex-Partner: Not on file    Emotionally Abused: Not on file    Physically Abused: Not on file    Sexually Abused: Not on file   Housing Stability:     Unable to Pay for Housing in the Last Year: Not on file    Number of [place] in the Last Year: Not on file    Unstable Housing in the Last Year: Not on file       SCREENINGS    Yousuf Coma Scale  Eye Opening: Spontaneous  Best Verbal Response: Oriented  Best Motor Response: Obeys commands  Yousuf Coma Scale Score: 15        PHYSICAL EXAM    (up to 7 for level 4, 8 or more for level 5)     ED Triage Vitals [04/01/22 1207]   BP Temp Temp Source Pulse Resp SpO2 Height Weight   (!) 185/75 98.2 °F (36.8 °C) Oral 72 18 97 % 5' 6\" (1.676 m) 160 lb (72.6 kg)       Physical Exam  Vitals reviewed. Constitutional:       General: She is not in acute distress. Appearance: She is well-developed. HENT:      Head: Normocephalic and atraumatic. Mouth/Throat:      Mouth: Mucous membranes are moist.   Eyes:      General: No scleral icterus. Pupils: Pupils are equal, round, and reactive to light. Neck:      Vascular: No JVD. Cardiovascular:      Rate and Rhythm: Normal rate and regular rhythm. Pulses: Normal pulses. Heart sounds: Normal heart sounds. Pulmonary:      Effort: Pulmonary effort is normal. No respiratory distress. Breath sounds: Rales present. Abdominal:      General: There is no distension. Palpations: Abdomen is soft. Tenderness: There is no abdominal tenderness. There is no guarding or rebound. Musculoskeletal:         General: No tenderness. Arms:       Cervical back: Normal range of motion and neck supple. Right lower leg: Edema (mild) present. Left lower leg: Edema (mild) present. Skin:     General: Skin is warm and dry. Capillary Refill: Capillary refill takes less than 2 seconds. Neurological:      General: No focal deficit present. Mental Status: She is alert and oriented to person, place, and time. GCS: GCS eye subscore is 4. GCS verbal subscore is 5. GCS motor subscore is 6. Cranial Nerves: Cranial nerves are intact. Sensory: Sensation is intact. Motor: Motor function is intact.    Psychiatric:         Mood and Affect: Mood normal.         Behavior: Behavior normal.         DIAGNOSTIC RESULTS EKG: All EKG's are interpreted by the Emergency Department Physician who either signs or Co-signs this chart in the absence of a cardiologist.    Normal sinus rhythm. Normal QT. Borderline ST changes. Probable LVH. RADIOLOGY:   Non-plain film images such as CT, Ultrasound and MRI are read by the radiologist. Magdaline Fujita images are visualized and preliminarily interpreted by the emergency physician with the below findings:        Interpretation per the Radiologist below, if available at the time of this note:    XR CHEST PORTABLE   Final Result   1. Volume overload with interstitial and carie pulmonary edema as well   as bilateral moderate pleural effusions.    Signed by Dr Peyman Ramirez            ED BEDSIDE ULTRASOUND:   Performed by ED Physician - none    LABS:  Labs Reviewed   CBC - Abnormal; Notable for the following components:       Result Value    WBC 3.9 (*)     RBC 2.76 (*)     Hemoglobin 7.8 (*)     Hematocrit 25.5 (*)     MCHC 30.6 (*)     RDW 18.1 (*)     All other components within normal limits   COMPREHENSIVE METABOLIC PANEL W/ REFLEX TO MG FOR LOW K - Abnormal; Notable for the following components:    Sodium 127 (*)     Potassium reflex Magnesium 5.7 (*)     Chloride 92 (*)     Glucose 617 (*)     BUN 45 (*)     CREATININE 5.9 (*)     GFR Non- 7 (*)     GFR  9 (*)     Calcium 7.9 (*)     Total Protein 5.5 (*)     Albumin 3.1 (*)     Alkaline Phosphatase 109 (*)     All other components within normal limits    Narrative:     CALL  [place]  [facility] tel. ,  Chemistry results called to and read back by Lizett Sanchez rn er, 04/01/2022 13:53,  by [facility]  Chemistry results called to and read back by Lizett Sanchez rn er, 04/01/2022 13:53,  by [address] [place] [address] - Abnormal; Notable for the following components:    Pro-BNP 35,000 (*)     All other components within normal limits   TROPONIN - Abnormal; Notable for the following components:    Troponin 0.19 (*)     All other components within normal limits    Narrative:     Danielle Mayes tel. ,  Chemistry results called to and read back by Joey Torres rn er, 04/01/2022 13:53,  by Fatoumata Miss results called to and read back by Joey Torres rn er, 04/01/2022 13:53,  by [facility]   TSH WITH REFLEX TO FT4 - Abnormal; Notable for the following components:    TSH Reflex FT4 15.26 (*)     All other components within normal limits   COVID-19, RAPID   VENOUS BLD GAS   URINALYSIS WITH REFLEX TO CULTURE   T4, FREE       All other labs were within normal range or not returned as of this dictation. EMERGENCY DEPARTMENT COURSE and DIFFERENTIALDIAGNOSIS/MDM:   Vitals:    Vitals:    04/01/22 1207 04/01/22 1216 04/01/22 1301   BP: (!) 185/75 (!) 207/85 (!) 195/73   Pulse: 72 72 70   Resp: 18 22 23   Temp: 98.2 °F (36.8 °C)     TempSrc: Oral     SpO2: 97% 93% 90%   Weight: 160 lb (72.6 kg)     Height: 5' 6\" (1.676 m)         MDM  Number of Diagnoses or Management Options  Critical Care  Total time providing critical care: (40 minutes)    Patient Progress  Patient progress: stable    Patient resting comfortably at this time and in no distress. Patient's case discussed with on-call nephrologist, Dr. Raymon Santiago, who will arrange for dialysis. He does not want me to do anything further to address patient's potassium as he said this should resolve soon with dialysis. Case discussed with Dr. Fili Lara, hospitalist, who is agreeable plan of care and admission to the ICU. Patient resting comfortably and updated about plan. CONSULTS:  IP CONSULT TO NEPHROLOGY    PROCEDURES:  Unless otherwise notedbelow, none     Procedures    FINAL IMPRESSION     1. Hyperkalemia    2. Hyperglycemia    3. Hypervolemia, unspecified hypervolemia type    4. End stage renal disease ([address]    5. Uncontrolled hypertension    6.  Elevated troponin          DISPOSITION/PLAN   DISPOSITION Decision To Admit 04/01/2022 02:02:34 PM      PATIENT REFERRED TO:  @FUP@    DISCHARGE MEDICATIONS:  New Prescriptions    No medications on file          (Please note that portions of this note were completed with a voice recognition program.  Efforts were made to edit the dictations butoccasionally words are mis-transcribed.)    Radha Whatley MD (electronically signed)  AttendingEmergency Physician         Radha Whatley MD  04/01/22 1412       Radha Whatley MD  04/01/22 [address] Frazier MD  06/04/22 1152

## 2022-04-01 NOTE — CONSULTS
Renal Consult Note    Thank you to requesting provider: Dr. Natasha Bucio, for asking us to see James Shah    Reason for consultation: End-stage renal disease    Chief Complaint: Dyspnea and fluid overload    History of Presenting Illness      Patient is a 58-year-old pleasant woman with a past medical history of type 2 diabetes, hypertension and end-stage renal disease. Patient has poorly controlled diabetes and frequent episodes of severe hyperglycemia. Patient was recently admitted to [facility] for the purpose. She claims that her transportation has not been set up and patient has to skip dialysis on March 31. Is getting dialysis on Tuesdays, Thursdays and Saturdays. She became progressively dyspneic. Patient was also complaining of not being given a diabetic diet and her insulin regimen has been changed and administered appropriately. She started complaining of increasing dyspnea and she became more anxious. She presented to [facility] in [place] and renal service was consulted. Dialysis is being administered on an emergent basis now.       Dialysis   Pt was seen on RRT  Modality: Hemodialysis  Access: Arterial Venous Fistula  Location: left upper  QB: 450  QD: 700  UF: 4.5 Liters    Past Medical/Surgical History      Active Ambulatory Problems     Diagnosis Date Noted    Gastroesophageal reflux disease without esophagitis     Acquired hypothyroidism     Anemia in chronic kidney disease (CKD) 05/23/2017    Iron deficiency 05/23/2017    Family history of colon cancer 10/20/2017    Unintentional weight loss 10/20/2017    Type 2 diabetes mellitus with chronic kidney disease on chronic dialysis, with long-term current use of insulin ([address] 12/09/2017    Acute encephalopathy     Noncompliance of patient with renal dialysis ([address]     Respiratory failure ([address] 03/05/2018    Acute respiratory failure with hypoxia and hypercapnia ([address] 03/07/2018    Dialysis AV fistula malfunction (HCC) 06/27/2018    Gastroparesis 08/08/2018    Nausea and vomiting 08/08/2018    Chronic constipation 08/08/2018    Uncontrolled type 2 diabetes mellitus with complication, with long-term current use of insulin ([address] 08/08/2018    Abnormal CT of liver 08/15/2018    Other ascites 08/15/2018    ESRD (end stage renal disease) on dialysis ([address]     High hepatic iron concentration determined by biopsy of liver 12/05/2018    Steal syndrome as complication of dialysis access ([address] 12/19/2018    PVD (peripheral vascular disease) ([address]     ESRD on hemodialysis ([address] 01/25/2019    Hyponatremia 01/26/2019    Hyperglycemia due to type 2 diabetes mellitus ([address] 01/26/2019    Normocytic anemia 01/26/2019    Volume overload 03/27/2019    Left homonymous hemianopsia     Acute intractable headache     Uncontrolled hypertension 04/20/2019    Medically noncompliant 04/20/2019    Noncompliance with medications     Seizure ([address] 10/08/2019    Hyperglycemic hyperosmolar nonketotic coma ([address]     Type 2 diabetes mellitus with hyperosmolar coma, with long-term current use of insulin ([address]     Palliative care patient 10/08/2019    Hypertensive emergency 06/24/2020    Chronic epigastric pain 08/18/2020    Chronic nausea 08/18/2020    Chronic vomiting 08/18/2020    Abnormal CT of the abdomen 08/18/2020    Poor appetite 08/18/2020    Personal history of colonic polyps 08/18/2020    Hyperosmolar syndrome 09/16/2020    DKA, type 2, not at goal [facility]) 10/21/2020    Hypertensive urgency 10/21/2020    Altered mental state 10/21/2020    Sacroiliitis ([address] 01/13/2021    Lumbar radiculopathy 01/13/2021    Left leg pain 02/24/2021    Left leg swelling 02/24/2021    Unable to ambulate 02/24/2021    Discharge from nipple 04/14/2021    Severe hyperglycemia due to diabetes mellitus ([address] 05/15/2021    Acute hypoxemic respiratory failure ([address] 05/29/2021    Generalized weakness 11/20/2021    Hyperosmolar hyperglycemic state (HHS) ([facility] 12/14/2021    Hyperglycemia 01/01/2022    Hypoglycemia associated with diabetes (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 12/09/2017    Hypertensive urgency, malignant     ESRD on hemodialysis ([facility]     Hyponatremia 03/08/2018    Diabetic ketoacidosis without coma associated with type 2 diabetes mellitus ([facility] 04/20/2019    Delirium 04/20/2019    Hypertensive encephalopathy 04/20/2019    UTI (urinary tract infection) 11/21/2021     Past Medical History:   Diagnosis Date    Arthritis     Chronic kidney disease, stage 5, kidney failure (HCC)     Closed fracture of right shoulder girdle, with routine healing, subsequent encounter     DM (diabetes mellitus) type II controlled with renal manifestation ([facility] 06/1993    GERD (gastroesophageal reflux disease)     Hemodialysis patient ([facility]     Hypertension     Hypothyroid     Nasal congestion     Neuropathy     Restless leg syndrome          Review of Systems     Constitutional:  No weight loss, no fever/chills  Eyes:  No eye pain, no eye redness  Cardiovascular:  No chest pain, + worsening of edema  Respiratory:  No hemoptysis, no stidor  Gastrointestinal:  No blood in stool, no n/v, no diarrhea  Genitoruinary:  No hematuria, no difficulty with urination  Musculoskeletal:  No joint swelling, no redness  Integumentary:  No Rash, no itching  Neurological:  No focal weakness, No new sensory deficit  Psychiatric:  No depression, no confusion  Endocrine:  No polyuria, no polydipsia       Medications      No current facility-administered medications for this encounter.     Current Outpatient Medications:     DULoxetine (CYMBALTA) 60 MG extended release capsule, Take 1 capsule by mouth daily Hold Cymbalta while taking Zyvox Antibiotic, Disp: 30 capsule, Rfl: 0    insulin glargine (BASAGLAR KWIKPEN) 100 UNIT/ML injection pen, Inject 5 Units into the skin nightly Indications: Diabetes, Disp: 1 pen, Rfl: 0    insulin aspart (NOVOLOG FLEXPEN) 100 UNIT/ML injection pen, Inject 4 Units into the skin 3 times daily (before meals) Indications: Diabetes, Disp: 5 pen, Rfl: 0    lactulose (CHRONULAC) 10 GM/15ML solution, Take 15 g by mouth 3 times daily With meals for constipation, hold for loose stools, Disp: , Rfl:     cloNIDine (CATAPRES) 0.1 MG/24HR PTWK, Place 1 patch onto the skin once a week, Disp: 4 patch, Rfl: 0    nicotine (NICODERM CQ) 14 MG/24HR, Place 1 patch onto the skin daily, Disp: 30 patch, Rfl: 3    isosorbide mononitrate (IMDUR) 120 MG extended release tablet, Take 1 tablet by mouth daily, Disp: 30 tablet, Rfl: 3    hydrALAZINE (APRESOLINE) 100 MG tablet, Take 1 tablet by mouth every 8 hours, Disp: 90 tablet, Rfl: 3    metoprolol succinate (TOPROL XL) 100 MG extended release tablet, Take 1 tablet by mouth every morning (before breakfast) (Patient taking differently: Take 150 mg by mouth every morning (before breakfast) ), Disp: 30 tablet, Rfl: 3    NIFEdipine (PROCARDIA XL) 30 MG extended release tablet, Take 3 tablets by mouth daily, Disp: 30 tablet, Rfl: 3    Ergocalciferol (VITAMIN D2) 10 MCG (400 UNIT) TABS, Take 1.25 mg by mouth every 14 days, Disp: , Rfl:     senna (SENNA-LAX) 8.6 MG tablet, Take 1 tablet by mouth daily, Disp: , Rfl:     sevelamer (RENVELA) 800 MG tablet, Take 1 tablet by mouth 3 times daily (with meals), Disp: , Rfl:     lisinopril (PRINIVIL;ZESTRIL) 20 MG tablet, Take 1 tablet by mouth 2 times daily, Disp: 30 tablet, Rfl: 0    albuterol sulfate  (90 Base) MCG/ACT inhaler, Inhale 2 puffs into the lungs every 4 hours as needed, Disp: , Rfl:     levothyroxine (SYNTHROID) 150 MCG tablet, Take 150 mcg by mouth Daily, Disp: , Rfl:     rOPINIRole (REQUIP) 2 MG tablet, Take 4 mg by mouth nightly Indications: Restless Leg Syndrome , Disp: , Rfl:     simvastatin (ZOCOR) 40 MG tablet, Take 40 mg by mouth nightly , Disp: , Rfl:   No outpatient medications have been marked as taking for the 22 encounter [facility] Encounter). Allergies   Penicillins, Lamisil advanced [terbinafine], Reglan [metoclopramide], and Stadol [butorphanol]    Family History       Family History   Problem Relation Age of Onset    Colon Cancer Mother          at 63    Colon Polyps Mother     Lung Cancer Father          at 79    Colon Polyps Father     Stomach Cancer Sister     Breast Cancer Sister 27    Depression Daughter 25        committed suicide    Liver Cancer Neg Hx     Liver Disease Neg Hx     Esophageal Cancer Neg Hx     Rectal Cancer Neg Hx      Family history negative for kidney disease.      Social History      Social History     Socioeconomic History    Marital status: Single     Spouse name: None    Number of children: 2    Years of education: None    Highest education level: None   Occupational History    None   Tobacco Use    Smoking status: Current Every Day Smoker     Packs/day: 0.50     Years: 33.00     Pack years: 16.50     Types: Cigarettes    Smokeless tobacco: Never Used    Tobacco comment: NOW at 1/4 PD, started at age 24 and has averaged 2 PPD   Vaping Use    Vaping Use: Never used   Substance and Sexual Activity    Alcohol use: No    Drug use: Not Currently     Types: Marijuana [place] Other)    Sexual activity: Not Currently     Partners: Male Other Topics Concern    None   Social History Narrative    None     Social Determinants of Health     Financial Resource Strain:     Difficulty of Paying Living Expenses: Not on file   Food Insecurity:     Worried About Running Out of Food in the Last Year: Not on file    Kendy of Food in the Last Year: Not on file   Transportation Needs:     Lack of Transportation (Medical): Not on file    Lack of Transportation (Non-Medical): Not on file   Physical Activity:     Days of Exercise per Week: Not on file    Minutes of Exercise per Session: Not on file   Stress:     Feeling of Stress : Not on file   Social Connections:     Frequency of Communication with Friends and Family: Not on file    Frequency of Social Gatherings with Friends and Family: Not on file    Attends [employer] Services: Not on file    Active Member of [address] [place] [address] Locately or Organizations: Not on file    Attends Club or Organization Meetings: Not on file    Marital Status: Not on file   Intimate Partner Violence:     Fear of Current or Ex-Partner: Not on file    Emotionally Abused: Not on file    Physically Abused: Not on file    Sexually Abused: Not on file   Housing Stability:     Unable to Pay for Housing in the Last Year: Not on file    Number of [place] in the Last Year: Not on file    Unstable Housing in the Last Year: Not on file       Physical Exam     Blood pressure (!) 195/73, pulse 70, temperature 98.2 °F (36.8 °C), temperature source Oral, resp. rate 23, height 5' 6\" (1.676 m), weight 160 lb (72.6 kg), SpO2 90 %.     General:  NAD, A+Ox3, ill-appearing, normal body habitus  HEENT:  PERRL, EOMI  Neck:  Supple, normal range of movement  Chest: Bilateral fine rales and diminished breath sounds at bases  CV:  RRR, soft systolic murmur  Abdomen:  NTND, soft, +BS, no hepatosplenomegaly  Extremities: Trace peripheral edema  Neurological:  Moving all four extremities  Lymphatics:  No palpable lymph nodes   Skin:  No rash, no jaundice  Psychiatric:  Normal insight and judgement, good recall    Data     Recent Labs     04/01/22  1230   WBC 3.9*   HGB 7.8*   HCT 25.5*   MCV 92.4        Recent Labs     04/01/22  1230   *   K 5.7*   CL 92*   CO2 22   GLUCOSE 617*   BUN 45*   CREATININE 5.9*   LABGLOM 7*   GFRAA 9*       Assessment:  1. End-stage renal disease  2. Type 2 diabetes with renal disease with severe hyperglycemia  3. Hypertension  4. Hyperkalemia  5. Hyponatremia  6. Anemia of chronic kidney disease      Plan:  · Urgent dialysis is being administered today with 2K bath. Follow-up labs. Service to check with nursing home about transportation. Strict diabetic diet as needed. Continue home medications.     Thank you for asking us to participate in the management of your patient, please do not hesitate to contact me for any concerns regarding my recommendations as outlined above.    -----------------------------  Electronically signed by Jeffrey Alvarado MD on 4/1/22 at 6:06 PM CDT

## 2022-04-02 LAB
ANION GAP SERPL CALCULATED.3IONS-SCNC: 13 MMOL/L (ref 7–19)
BUN BLDV-MCNC: 28 MG/DL (ref 6–20)
CALCIUM SERPL-MCNC: 8 MG/DL (ref 8.6–10)
CHLORIDE BLD-SCNC: 95 MMOL/L (ref 98–111)
CO2: 26 MMOL/L (ref 22–29)
CREAT SERPL-MCNC: 4 MG/DL (ref 0.5–0.9)
GFR AFRICAN AMERICAN: 14
GFR NON-AFRICAN AMERICAN: 12
GLUCOSE BLD-MCNC: 123 MG/DL (ref 74–109)
GLUCOSE BLD-MCNC: 142 MG/DL (ref 70–99)
GLUCOSE BLD-MCNC: 161 MG/DL (ref 74–109)
GLUCOSE BLD-MCNC: 176 MG/DL (ref 70–99)
PERFORMED ON: ABNORMAL
PERFORMED ON: ABNORMAL
POTASSIUM REFLEX MAGNESIUM: 4.7 MMOL/L (ref 3.5–5)
SODIUM BLD-SCNC: 134 MMOL/L (ref 136–145)

## 2022-04-02 PROCEDURE — 8010000000 HC HEMODIALYSIS ACUTE INPT

## 2022-04-02 PROCEDURE — 1210000000 HC MED SURG R&B

## 2022-04-02 PROCEDURE — 80048 BASIC METABOLIC PNL TOTAL CA: CPT

## 2022-04-02 PROCEDURE — 36415 COLL VENOUS BLD VENIPUNCTURE: CPT

## 2022-04-02 PROCEDURE — 96372 THER/PROPH/DIAG INJ SC/IM: CPT

## 2022-04-02 PROCEDURE — G0378 HOSPITAL OBSERVATION PER HR: HCPCS

## 2022-04-02 PROCEDURE — 6360000002 HC RX W HCPCS: Performed by: INTERNAL MEDICINE

## 2022-04-02 PROCEDURE — 82947 ASSAY GLUCOSE BLOOD QUANT: CPT

## 2022-04-02 PROCEDURE — 2700000000 HC OXYGEN THERAPY PER DAY

## 2022-04-02 PROCEDURE — 6370000000 HC RX 637 (ALT 250 FOR IP): Performed by: INTERNAL MEDICINE

## 2022-04-02 PROCEDURE — 2580000003 HC RX 258: Performed by: INTERNAL MEDICINE

## 2022-04-02 RX ADMIN — HEPARIN SODIUM 5000 UNITS: 5000 INJECTION INTRAVENOUS; SUBCUTANEOUS at 23:02

## 2022-04-02 RX ADMIN — ISOSORBIDE MONONITRATE 120 MG: 60 TABLET, EXTENDED RELEASE ORAL at 11:57

## 2022-04-02 RX ADMIN — SODIUM CHLORIDE, PRESERVATIVE FREE 10 ML: 5 INJECTION INTRAVENOUS at 12:00

## 2022-04-02 RX ADMIN — SEVELAMER CARBONATE 800 MG: 800 TABLET, FILM COATED ORAL at 11:57

## 2022-04-02 RX ADMIN — METOPROLOL SUCCINATE 100 MG: 50 TABLET, FILM COATED, EXTENDED RELEASE ORAL at 11:57

## 2022-04-02 RX ADMIN — NIFEDIPINE 90 MG: 90 TABLET, FILM COATED, EXTENDED RELEASE ORAL at 11:57

## 2022-04-02 RX ADMIN — LISINOPRIL 20 MG: 20 TABLET ORAL at 23:03

## 2022-04-02 RX ADMIN — ATORVASTATIN CALCIUM 20 MG: 20 TABLET, FILM COATED ORAL at 11:58

## 2022-04-02 RX ADMIN — LISINOPRIL 20 MG: 20 TABLET ORAL at 11:58

## 2022-04-02 RX ADMIN — LACTULOSE 15.33 G: 20 SOLUTION ORAL at 12:00

## 2022-04-02 RX ADMIN — LEVOTHYROXINE SODIUM 150 MCG: 75 TABLET ORAL at 11:55

## 2022-04-02 RX ADMIN — HYDRALAZINE HYDROCHLORIDE 100 MG: 50 TABLET, FILM COATED ORAL at 23:03

## 2022-04-02 RX ADMIN — SEVELAMER CARBONATE 800 MG: 800 TABLET, FILM COATED ORAL at 18:41

## 2022-04-02 RX ADMIN — HEPARIN SODIUM 5000 UNITS: 5000 INJECTION INTRAVENOUS; SUBCUTANEOUS at 06:22

## 2022-04-02 RX ADMIN — INSULIN LISPRO 5 UNITS: 100 INJECTION, SOLUTION INTRAVENOUS; SUBCUTANEOUS at 13:52

## 2022-04-02 RX ADMIN — HYDRALAZINE HYDROCHLORIDE 100 MG: 50 TABLET, FILM COATED ORAL at 06:22

## 2022-04-02 RX ADMIN — HEPARIN SODIUM 5000 UNITS: 5000 INJECTION INTRAVENOUS; SUBCUTANEOUS at 15:32

## 2022-04-02 RX ADMIN — HYDRALAZINE HYDROCHLORIDE 100 MG: 50 TABLET, FILM COATED ORAL at 15:32

## 2022-04-02 RX ADMIN — SODIUM CHLORIDE, PRESERVATIVE FREE 10 ML: 5 INJECTION INTRAVENOUS at 23:05

## 2022-04-02 RX ADMIN — SENNOSIDES 8.6 MG: 8.6 TABLET, COATED ORAL at 11:58

## 2022-04-02 RX ADMIN — DULOXETINE 60 MG: 30 CAPSULE, DELAYED RELEASE ORAL at 11:58

## 2022-04-02 RX ADMIN — ROPINIROLE HYDROCHLORIDE 4 MG: 2 TABLET, FILM COATED ORAL at 23:03

## 2022-04-02 ASSESSMENT — ENCOUNTER SYMPTOMS
COLOR CHANGE: 0
RHINORRHEA: 0
CONSTIPATION: 0
COUGH: 1
VOICE CHANGE: 0
VOMITING: 0
NAUSEA: 0
DIARRHEA: 0
SHORTNESS OF BREATH: 1
BACK PAIN: 0

## 2022-04-02 ASSESSMENT — PAIN SCALES - GENERAL: PAINLEVEL_OUTOF10: 0

## 2022-04-02 NOTE — PROGRESS NOTES
Nephrology ([facility]) Progress Note    Patient:  Maia Farias  YOB: 1970  Date of Service: 4/2/2022  MRN: [id number]   Acct: [de-identified]   Primary Care Physician: ROMINA Denny  Advance Directive: Full Code  Admit Date: 4/1/2022       Hospital Day: 1  Referring Provider: Danial Delgado DO    Patient Seen, Chart, Consults, Notes, Labs, Radiology studies reviewed. Subjective:  Patient is a 68-year-old pleasant woman with a past medical history of type 2 diabetes, hypertension and end-stage renal disease. Patient has poorly controlled diabetes and frequent episodes of severe hyperglycemia. Patient was recently admitted to [facility] for the purpose. She claims that her transportation has not been set up and patient has to skip dialysis on March 31. Is getting dialysis on Tuesdays, Thursdays and Saturdays. She became progressively dyspneic. Patient was also complaining of not being given a diabetic diet and her insulin regimen has been changed and administered appropriately. She started complaining of increasing dyspnea and she became more anxious. She presented to [facility] in [facility] and renal service was consulted. Dialysis was administered on an emergent basis. Patient did better. She is seen and examined today on dialysis. She offers no new complaints.   Her blood pressure and blood sugars have improved.        Dialysis   Pt was seen on RRT  Modality: Hemodialysis  Access: Arterial Venous Fistula  Location: left upper  QB: 450  QD: 700  UF: 5 Liters      Allergies:  Penicillins, Lamisil advanced [terbinafine], Reglan [metoclopramide], and Stadol [butorphanol]    Medicines:  Current Facility-Administered Medications   Medication Dose Route Frequency Provider Last Rate Last Admin    rOPINIRole (REQUIP) tablet 4 mg  4 mg Oral Nightly Danial Delgado DO   4 mg at 04/01/22 2037    atorvastatin (LIPITOR) tablet 20 mg  20 mg Oral Daily [place] Curb, DO   20 mg at 04/01/22 2106    levothyroxine (SYNTHROID) tablet 150 mcg  150 mcg Oral Daily [place] Curb, DO        lisinopril (PRINIVIL;ZESTRIL) tablet 20 mg  20 mg Oral BID [place] Curb, DO   20 mg at 04/01/22 2037    sevelamer (RENVELA) tablet 800 mg  800 mg Oral TID  [place] Curb, DO   800 mg at 04/01/22 2036    senna (SENOKOT) tablet 8.6 mg  1 tablet Oral Daily [place] Curb, DO   8.6 mg at 04/01/22 2036    isosorbide mononitrate (IMDUR) extended release tablet 120 mg  120 mg Oral Daily [place] Curb, DO   120 mg at 04/01/22 2037    hydrALAZINE (APRESOLINE) tablet 100 mg  100 mg Oral 3 times per day [place] Curb, DO   100 mg at 04/02/22 0862    metoprolol succinate (TOPROL XL) extended release tablet 100 mg  100 mg Oral QAM AC [place] Curb, DO        NIFEdipine (PROCARDIA XL) extended release tablet 90 mg  90 mg Oral Daily [place] Curb, DO   90 mg at 04/01/22 2036    cloNIDine (CATAPRES) 0.1 MG/24HR 1 patch  1 patch TransDERmal Weekly [place] Curb, DO   1 patch at 04/01/22 2317    nicotine (NICODERM CQ) 14 MG/24HR 1 patch  1 patch TransDERmal Daily [place] Curb, DO   1 patch at 04/01/22 2037    lactulose (CHRONULAC) 10 GM/15ML solution 15.3333 g  15.3333 g Oral TID [place] Curb, DO        insulin glargine (LANTUS) injection vial 5 Units  5 Units SubCUTAneous Nightly [place] Curb, DO        insulin lispro (HUMALOG) injection vial 5 Units  5 Units SubCUTAneous TID  [place] Curb, DO        DULoxetine (CYMBALTA) extended release capsule 60 mg  60 mg Oral Daily [place] Curb, DO   60 mg at 04/01/22 2037    sodium chloride flush 0.9 % injection 10 mL  10 mL IntraVENous 2 times per day [place] Curb, DO        sodium chloride flush 0.9 % injection 10 mL  10 mL IntraVENous PRN [place] Curb, DO        0.9 % sodium chloride infusion   IntraVENous PRN [place] Curb, DO  heparin (porcine) injection 5,000 Units  5,000 Units SubCUTAneous 3 times per day Haider Harrison DO   5,000 Units at 04/02/22 8482    ondansetron (ZOFRAN-ODT) disintegrating tablet 4 mg  4 mg Oral Q8H PRN Haider Harrison,         Or    ondansetron [facility]) injection 4 mg  4 mg IntraVENous Q6H PRN [place] Harrison, DO        magnesium hydroxide (MILK OF MAGNESIA) 400 MG/5ML suspension 30 mL  30 mL Oral Daily PRN Haider Harrison, DO        acetaminophen (TYLENOL) tablet 650 mg  650 mg Oral Q6H PRN Haider Harrison, DO   650 mg at 04/01/22 2037    Or    acetaminophen (TYLENOL) suppository 650 mg  650 mg Rectal Q6H PRN Haider Harrison,         albuterol (PROVENTIL) nebulizer solution 2.5 mg  2.5 mg Nebulization Q4H PRN Haider Harrison,         [START ON 4/12/2022] vitamin D (ERGOCALCIFEROL) capsule 50,000 Units  50,000 Units Oral Q14 Days Haider Harrison DO           Past Medical History:  Past Medical History:   Diagnosis Date    Arthritis     [facility] kidney disease, stage 5, [facility] failure [address]     Closed fracture of right shoulder girdle, with routine healing, subsequent encounter     DM (diabetes mellitus) type II controlled with renal manifestation ([facility] 06/1993    Gastroparesis     GERD (gastroesophageal reflux disease)     Hemodialysis patient ([facility]     dialysis on tues, thur, and sat at [facility]    Hypertension     Hypothyroid     Nasal congestion     recent    Neuropathy     Noncompliance with medications     Palliative care patient 10/08/2019    Restless leg syndrome        Past Surgical History:  Past Surgical History:   Procedure Laterality Date    BREAST SURGERY Left 2008    infected milk gland removed    BREAST SURGERY Right 5/25/2021    WEDGE EXCISION RIGHT BREAST DUCT WITH LACRIMAL DUCT PROBE, PEC BLOCK performed by Yumi Reyna MD at [address] [place] [address], LAPAROSCOPIC      2008    COLONOSCOPY Left 9/17/2020     Baker-To hepatic flexure-Severe tortuosity with severe spasming, 1 yr recall    DIALYSIS FISTULA CREATION Left 1/25/2019    REVISION LEFT UPPER EXTREMITY AV ACCESS WITH LEFT BRACHIAL ARTERY TO LEFT AXILLARY VEIN WITH  INTERPOSITIONAL ARTEGRAFT   performed by Amor Radford MD at [address]  2012    [address] Right 1/25/2019    INSERTION OF RIGHT INTERNAL JUGULAR HEMODIALYSIS CATHETER performed by Amor Radford MD at [facility]  08/17/2018    1.  Moderately increased stainable iron identified by special stain in Kupffer cells and occasional hepatocytes. Negative for evidence of significant portal or lobular inflammation. Negative for evidence of significant fibrosis    SC COLONOSCOPY W/BIOPSY SINGLE/MULTIPLE N/A 10/20/2017    Dr Shahla Silva prep-Tubular AP (-) dysplasia x 2--3 yr recall    SC EGD TRANSORAL BIOPSY SINGLE/MULTIPLE N/A 12/27/2016    Dr Ellen Benson EGD TRANSORAL BIOPSY SINGLE/MULTIPLE N/A 10/20/2017    Dr Sara Dooley (-)   [address] [facility] VASCULAR SURGERY Left 2016    fistula by Dr Overton Shows at [address]  10/02/2017    [facility]. Left upper fistulograms/venograms, balloon angioplasty cephalic vein arch 87P37 conquest.    VASCULAR SURGERY  08/2018    FISTULOGRAM    VASCULAR SURGERY  10/29/2018    [facility]. Left upper fistulograms/venograms    VASCULAR SURGERY  12/19/2018    [facility]. Arch aortogram,left upper arteriograms.  VASCULAR SURGERY  03/06/2019    [facility]. Removal of tunneled dialyis catheter right internal jugular vein.  VASCULAR SURGERY  08/28/2019    [facility]. Left upper extremity fistulogram including venography to the superior vena cava. Balloon angioplasty left subclavian vein with 10mm x 40mm conquest balloon. Balloon angioplasty mid/proximal upper arm cephalic vein with 06AZ x 40mm conquest balloon. Venograms after balloon angioplasty. Stent left mid/proximal upper arm cephalic vein stenosis fluency 10mm x 60mm self-expanding covered stent. Balloon     VASCULAR SURGERY  2019    cont angioplasty stent with 10mm x 40mm conquest balloon. Completion venograms left upper extremity. Family History  Family History   Problem Relation Age of Onset    Colon Cancer Mother          at 63    Colon Polyps Mother     Lung Cancer Father          at 79    Colon Polyps Father     Stomach Cancer Sister     Breast Cancer Sister 27    Depression Daughter 25        committed suicide    Liver Cancer Neg Hx     Liver Disease Neg Hx     Esophageal Cancer Neg Hx     Rectal Cancer Neg Hx        Social History  Social History     Socioeconomic History    Marital status: Single     Spouse name: Not on file    Number of children: 2    Years of education: Not on file    Highest education level: Not on file   Occupational History    Not on file   Tobacco Use    Smoking status: Current Every Day Smoker     Packs/day: 0.50     Years: 33.00     Pack years: 16.50     Types: Cigarettes    Smokeless tobacco: Never Used    Tobacco comment: NOW at 1/4 PD, started at age 24 and has averaged 2 PPD   Vaping Use    Vaping Use: Never used   Substance and Sexual Activity    Alcohol use: No    Drug use: Not Currently     Types: Marijuana Mahesh Relic)    Sexual activity: Not Currently     Partners: Male   Other Topics Concern    Not on file   Social History Narrative    Not on file     Social Determinants of Health     Financial Resource Strain:     Difficulty of Paying Living Expenses: Not on file   Food Insecurity:     Worried About Running Out of Food in the Last Year: Not on file    Kendy of Food in the Last Year: Not on file   Transportation Needs:     Lack of Transportation (Medical): Not on file    Lack of Transportation (Non-Medical):  Not on file   Physical Activity:     Days of Exercise per Week: Not on file    Minutes of Exercise per Session: Not on file   Stress:     Feeling of Stress : Not on file   Social Connections:     Frequency of Communication with Friends and Family: Not on file    Frequency of Social Gatherings with Friends and Family: Not on file    Attends [employer] Services: Not on file    Active Member of Clubs or Organizations: Not on file    Attends Club or Organization Meetings: Not on file    Marital Status: Not on file   Intimate Partner Violence:     Fear of Current or Ex-Partner: Not on file    Emotionally Abused: Not on file    Physically Abused: Not on file    Sexually Abused: Not on file   Housing Stability:     Unable to Pay for Housing in the Last Year: Not on file    Number of [place] in the Last Year: Not on file    Unstable Housing in the Last Year: Not on file         Review of Systems:  Reviewed with the patient at the bedside, 6 points reviewed and negative except as noted above. Objective:  Blood pressure 108/66, pulse 66, temperature 98.4 °F (36.9 °C), resp. rate 16, height 5' 6\" (1.676 m), weight 160 lb (72.6 kg), SpO2 90 %. No intake or output data in the 24 hours ending 04/02/22 6616  General: awake/alert   Chest:  clear to auscultation bilaterally without respiratory distress  CVS: regular rate and rhythm  Abdominal: soft, nontender, normal bowel sounds  Extremities: no cyanosis or edema  Skin: warm and dry without rash    Labs:  BMP:   Recent Labs     04/01/22  1230 04/02/22  0009   * 134*   K 5.7* 4.7   CL 92* 95*   CO2 22 26   BUN 45* 28*   CREATININE 5.9* 4.0*   CALCIUM 7.9* 8.0*     CBC:   Recent Labs     04/01/22  1230   WBC 3.9*   HGB 7.8*   HCT 25.5*   MCV 92.4        LIVER PROFILE:   Recent Labs     04/01/22  1230   AST 14   ALT 5   BILITOT <0.2   ALKPHOS 109*     PT/INR: No results for input(s): PROTIME, INR in the last 72 hours. APTT: No results for input(s): APTT in the last 72 hours. BNP:  No results for input(s): BNP in the last 72 hours. Ionized Calcium:No results for input(s): IONCA in the last 72 hours.   Magnesium:No results for input(s): MG in the last 72 hours. Phosphorus:No results for input(s): PHOS in the last 72 hours. HgbA1C: No results for input(s): LABA1C in the last 72 hours. Hepatic:   Recent Labs     04/01/22  1230   ALKPHOS 109*   ALT 5   AST 14   PROT 5.5*   BILITOT <0.2   LABALBU 3.1*     Lactic Acid: No results for input(s): LACTA in the last 72 hours. Troponin: No results for input(s): CKTOTAL, CKMB, TROPONINT in the last 72 hours. ABGs: No results for input(s): PH, PCO2, PO2, HCO3, O2SAT in the last 72 hours. CRP:  No results for input(s): CRP in the last 72 hours. Sed Rate:  No results for input(s): SEDRATE in the last 72 hours. Cultures:   No results for input(s): CULTURE in the last 72 hours. No results for input(s): BC, [place] in the last 72 hours. No results for input(s): CXSURG in the last 72 hours. Radiology reports as per the Radiologist  Radiology: XR CHEST PORTABLE    Result Date: 4/1/2022  XR CHEST PORTABLE 4/1/2022 12:35 PM HISTORY: Shortness of breath  Technique: Single AP view of the chest COMPARISONS: Chest exam dated 1/22/2022 FINDINGS: Cardiomegaly with central pulmonary vascular congestion, interstitial and alveolar edema as well as bilateral moderate layering pleural effusions. No pneumothorax. No acute bony abnormality. 1. Volume overload with interstitial and carie pulmonary edema as well as bilateral moderate pleural effusions. Signed by Dr Linda Urias   1. End-stage renal disease  2. Type 2 diabetes with renal disease with severe hyperglycemia  3. Hypertension  4. Hyperkalemia  5. Hyponatremia  6. Anemia of chronic kidney disease      Plan:  Dialysis as above. Continue low potassium diet. Follow-up labs.     Muna Friedman MD, MD  04/02/22  9:22 AM

## 2022-04-02 NOTE — PROGRESS NOTES
Comprehensive Nutrition Assessment    Type and Reason for Visit:  Initial,Positive Nutrition Screen,Wound    Nutrition Assessment:  Pt nutritionally compromised AEB Stage 3 PU coccyx. At risk for further compromise d/t glucose ranges from 117-617 during this admission and not on a carb controlled diet. Will modify diet to manage BG and observe renal MNT for conditions. Start nighttime snack. Recommend Nephrovite or renal-friendly multivitamin. Malnutrition Assessment:  Malnutrition Status:  Insufficient data      Estimated Daily Nutrient Needs:  Energy (kcal):  2541 (35 kcal/kg/d - HD and wound healing); Weight Used for Energy Requirements:  Current     Protein (g):  88 (1.4g/kg AdjBW/d - HD and wound healing); Weight Used for Protein Requirements:  Adjusted (137.5lb)        Fluid (ml/day):  UOP + 1000mL; Method Used for Fluid Requirements:  Other (Comment)      Nutrition Related Findings:  3L NC, missing teeth, HD T/R/S routine, stage 3 coccyx, no BM documented at this time but lactulose provided x 1 per MAR. Wounds:  Stage III       Current Nutrition Therapies:    ADULT DIET; Regular; Low Phosphorus (Less than 1000 mg)    Anthropometric Measures:  · Height: 5' 6\" (167.6 cm)  · Current Body Weight: 160 lb (72.6 kg)   · Admission Body Weight:      · Usual Body Weight:       · Ideal Body Weight: 130 lbs; % Ideal Body Weight 123.1 %   · BMI: 25.8  · Adjusted Body Weight:  ; No Adjustment   · Adjusted BMI:      · BMI Categories: Overweight (BMI 25.0-29. 9)       Nutrition Diagnosis:   · Increased nutrient needs related to renal dysfunction,other (comment),acute injury/trauma (pressure ulcer/injury to coccyx) as evidenced by dialysis      Nutrition Interventions:   Food and/or Nutrient Delivery:  Modify Current Diet,Snacks (Comment)  Nutrition Education/Counseling:  Education not appropriate   Coordination of Nutrition Care:  Continue to monitor while inpatient    Goals:   Tolerate PO without difficulty; PO > 75% meals. Improve nutrition-related lab values; accucheck </= 180. Wound healing. Weight loss; appropriate due to fluid overload status. Nutrition Monitoring and Evaluation:   Behavioral-Environmental Outcomes:  None Identified   Food/Nutrient Intake Outcomes:  Food and Nutrient Intake  Physical Signs/Symptoms Outcomes:  Biochemical Data,GI Status,Fluid Status or Edema,Skin,Weight     Discharge Planning:     Too soon to determine     Electronically signed by Cookie Leigh MS, RD, LD on 4/2/22 at 12:26 PM CDT    Contact: [phone]

## 2022-04-02 NOTE — H&P
Hospital Medicine H&P    Patient:  Yamilet Starkey  MRN: [id number]    Consulting Physician: Jillian Berkowitz DO  Reason for Consult: Medical Management  Primacy Care Physician: ROMINA Hoyos    HISTORY OF PRESENT ILLNESS:   The patient is a 47 y.o. female presents with shortness of breath, volume overload, hypoxia. She missed her dialysis on 3/31. She presents with the above-noted symptoms and is in fluid overload. Sadly, she has spent the majority of January and February in and out of [facility] and [facility]. She is debilitated and has a poor quality of life.     Past Medical History:        Diagnosis Date    Arthritis     Chronic kidney disease, stage 5, kidney failure (HCC)     Closed fracture of right shoulder girdle, with routine healing, subsequent encounter     DM (diabetes mellitus) type II controlled with renal manifestation ([facility] [address] 06/1993    Gastroparesis     GERD (gastroesophageal reflux disease)     Hemodialysis patient ([facility] [address]     dialysis on tues, thur, and sat at [facility] clinic    Hypertension     Hypothyroid     Nasal congestion     recent    Neuropathy     Noncompliance with medications     Palliative care patient 10/08/2019    Restless leg syndrome        Past Surgical History:        Procedure Laterality Date    BREAST SURGERY Left 2008    infected milk gland removed    BREAST SURGERY Right 5/25/2021    WEDGE EXCISION RIGHT BREAST DUCT WITH LACRIMAL DUCT PROBE, PEC BLOCK performed by Janak Ferreira MD at [address] [facility], [facility]      2008    COLONOSCOPY Left 9/17/2020    Dr Steffen Carey hepatic flexure-Severe tortuosity with severe spasming, 1 yr recall    DIALYSIS FISTULA CREATION Left 1/25/2019    REVISION LEFT UPPER EXTREMITY AV ACCESS WITH LEFT BRACHIAL ARTERY TO LEFT AXILLARY VEIN WITH  INTERPOSITIONAL ARTEGRAFT   performed by Cindi Chen MD at [address]  2012    [address] Right 1/25/2019    INSERTION OF RIGHT INTERNAL JUGULAR HEMODIALYSIS CATHETER performed by Katina Banda MD at [address] [facility]  08/17/2018    1.  Moderately increased stainable iron identified by special stain in Kupffer cells and occasional hepatocytes. Negative for evidence of significant portal or lobular inflammation. Negative for evidence of significant fibrosis    MS COLONOSCOPY W/BIOPSY SINGLE/MULTIPLE N/A 10/20/2017    Dr Blaise Gupta prep-Tubular AP (-) dysplasia x 2--3 yr recall    MS EGD TRANSORAL BIOPSY SINGLE/MULTIPLE N/A 12/27/2016    Dr Diann Valentin EGD TRANSORAL BIOPSY SINGLE/MULTIPLE N/A 10/20/2017    Dr Mcnair Blades (-)   [address] [facility] VASCULAR SURGERY Left 2016    fistula by Dr Yeimi Reagan at [address]  10/02/2017    SJS. Left upper fistulograms/venograms, balloon angioplasty cephalic vein arch 76A07 conquest.    VASCULAR SURGERY  08/2018    FISTULOGRAM    VASCULAR SURGERY  10/29/2018    SJS. Left upper fistulograms/venograms    VASCULAR SURGERY  12/19/2018    SJS. Arch aortogram,left upper arteriograms.  VASCULAR SURGERY  03/06/2019    SJS. Removal of tunneled dialyis catheter right internal jugular vein.  VASCULAR SURGERY  08/28/2019    SJS. Left upper extremity fistulogram including venography to the superior vena cava. Balloon angioplasty left subclavian vein with 10mm x 40mm conquest balloon. Balloon angioplasty mid/proximal upper arm cephalic vein with 86DP x 40mm conquest balloon. Venograms after balloon angioplasty. Stent left mid/proximal upper arm cephalic vein stenosis fluency 10mm x 60mm self-expanding covered stent. Balloon     VASCULAR SURGERY  08/28/2019    cont angioplasty stent with 10mm x 40mm conquest balloon. Completion venograms left upper extremity.        Medications: Scheduled Meds:   rOPINIRole  4 mg Oral Nightly    atorvastatin  20 mg Oral Daily    levothyroxine  150 mcg Oral Daily    lisinopril  20 mg Oral BID    sevelamer  800 mg Oral TID WC    senna  1 tablet Oral Daily    isosorbide mononitrate  120 mg Oral Daily    hydrALAZINE  100 mg Oral 3 times per day    metoprolol succinate  100 mg Oral QAM AC    NIFEdipine  90 mg Oral Daily    cloNIDine  1 patch TransDERmal Weekly    nicotine  1 patch TransDERmal Daily    lactulose  15.3333 g Oral TID    insulin glargine  5 Units SubCUTAneous Nightly    insulin lispro  5 Units SubCUTAneous TID WC    DULoxetine  60 mg Oral Daily    sodium chloride flush  10 mL IntraVENous 2 times per day    heparin (porcine)  5,000 Units SubCUTAneous 3 times per day    [START ON 2022] vitamin D  50,000 Units Oral Q14 Days     Continuous Infusions:   sodium chloride       PRN Meds:.sodium chloride flush, sodium chloride, ondansetron **OR** ondansetron, magnesium hydroxide, acetaminophen **OR** acetaminophen, albuterol    Allergies:  Penicillins, Lamisil advanced [terbinafine], Reglan [metoclopramide], and Stadol [butorphanol]    Social History:   TOBACCO:   reports that she has been smoking cigarettes. She has a 16.50 pack-year smoking history. She has never used smokeless tobacco.  ETOH:   reports no history of alcohol use. Family History:       Problem Relation Age of Onset    Colon Cancer Mother          at 63    Colon Polyps Mother     Lung Cancer Father          at 79    Colon Polyps Father     Stomach Cancer Sister     Breast Cancer Sister 27    Depression Daughter 25        committed suicide    Liver Cancer Neg Hx     Liver Disease Neg Hx     Esophageal Cancer Neg Hx     Rectal Cancer Neg Hx        ROS:  Review of Systems   Constitutional: Positive for activity change. Negative for fatigue. HENT: Negative for rhinorrhea and voice change. Eyes: Negative for visual disturbance. Respiratory: Positive for cough and shortness of breath. Cardiovascular: Positive for leg swelling. Negative for chest pain.    Gastrointestinal: Negative for constipation, diarrhea, nausea and vomiting. Endocrine: Negative for polyuria. Genitourinary: Negative for difficulty urinating and dysuria. Musculoskeletal: Positive for gait problem. Negative for arthralgias and back pain. Skin: Negative for color change. Allergic/Immunologic: Negative for immunocompromised state. Neurological: Positive for weakness. Negative for dizziness and headaches. Psychiatric/Behavioral: Negative for confusion. Physical Exam:    Vitals: BP (!) 140/64   Pulse 72   Temp 97.9 °F (36.6 °C) (Temporal)   Resp 16   Ht 5' 6\" (1.676 m)   Wt 160 lb (72.6 kg)   SpO2 93%   BMI 25.82 kg/m²     Physical Exam  Vitals and nursing note reviewed. Constitutional:       Appearance: She is ill-appearing. HENT:      Head: Normocephalic and atraumatic. Right Ear: External ear normal.      Left Ear: External ear normal.      Nose: Nose normal.      Mouth/Throat:      Mouth: Mucous membranes are moist.   Eyes:      Conjunctiva/sclera: Conjunctivae normal.      Pupils: Pupils are equal, round, and reactive to light. Cardiovascular:      Rate and Rhythm: Normal rate and regular rhythm. Heart sounds: Normal heart sounds. Pulmonary:      Effort: Pulmonary effort is normal.      Breath sounds: Normal breath sounds. Abdominal:      General: Abdomen is flat. Palpations: Abdomen is soft. Musculoskeletal:         General: Normal range of motion. Cervical back: Neck supple. No rigidity. No muscular tenderness. Skin:     General: Skin is warm and dry. Neurological:      Mental Status: She is alert and oriented to person, place, and time.    Psychiatric:         Mood and Affect: Mood normal.         CBC:   Recent Labs     04/01/22  1230   WBC 3.9*   HGB 7.8*        BMP:    Recent Labs     04/01/22  1230 04/01/22  1230 04/01/22  2018 04/02/22  0009 04/02/22  1100   *  --   --  134*  --    K 5.7*  --   --  4.7  --    CL 92*  --   --  95*  --    CO2 22  --   --  26  --    BUN 45* --   --  28*  --    CREATININE 5.9*  --   --  4.0*  --    GLUCOSE 617*   < > 117* 123* 161*    < > = values in this interval not displayed. Hepatic:   Recent Labs     04/01/22  1230   AST 14   ALT 5   BILITOT <0.2   ALKPHOS 109*     Troponin:   Recent Labs     04/01/22  1230   TROPONINI 0.19*     BNP: No results for input(s): BNP in the last 72 hours. Lipids: No results for input(s): CHOL, HDL in the last 72 hours. Invalid input(s): LDLCALCU  ABGs:   Lab Results   Component Value Date    PHART 7.340 01/01/2022    PO2ART 57.0 01/01/2022    JXD3AEW 44.0 01/01/2022     INR: No results for input(s): INR in the last 72 hours. -----------------------------------------------------------------  XR CHEST PORTABLE    Result Date: 4/1/2022  XR CHEST PORTABLE 4/1/2022 12:35 PM HISTORY: Shortness of breath  Technique: Single AP view of the chest COMPARISONS: Chest exam dated 1/22/2022 FINDINGS: Cardiomegaly with central pulmonary vascular congestion, interstitial and alveolar edema as well as bilateral moderate layering pleural effusions. No pneumothorax. No acute bony abnormality. 1. Volume overload with interstitial and carie pulmonary edema as well as bilateral moderate pleural effusions. Signed by Dr Pina Zarate and Plan     Volume overload. Consult with nephrology for dialysis therapy. End-stage renal disease. Maintenance dialysis.       Rose Mary Greene DO

## 2022-04-03 LAB
GLUCOSE BLD-MCNC: 109 MG/DL (ref 70–99)
GLUCOSE BLD-MCNC: 228 MG/DL (ref 70–99)
GLUCOSE BLD-MCNC: 354 MG/DL (ref 70–99)
PERFORMED ON: ABNORMAL

## 2022-04-03 PROCEDURE — 96372 THER/PROPH/DIAG INJ SC/IM: CPT

## 2022-04-03 PROCEDURE — 2700000000 HC OXYGEN THERAPY PER DAY

## 2022-04-03 PROCEDURE — 6370000000 HC RX 637 (ALT 250 FOR IP): Performed by: INTERNAL MEDICINE

## 2022-04-03 PROCEDURE — 82947 ASSAY GLUCOSE BLOOD QUANT: CPT

## 2022-04-03 PROCEDURE — 6360000002 HC RX W HCPCS: Performed by: INTERNAL MEDICINE

## 2022-04-03 PROCEDURE — G0378 HOSPITAL OBSERVATION PER HR: HCPCS

## 2022-04-03 PROCEDURE — 2580000003 HC RX 258: Performed by: INTERNAL MEDICINE

## 2022-04-03 PROCEDURE — 1210000000 HC MED SURG R&B

## 2022-04-03 RX ADMIN — HYDRALAZINE HYDROCHLORIDE 100 MG: 50 TABLET, FILM COATED ORAL at 21:03

## 2022-04-03 RX ADMIN — HEPARIN SODIUM 5000 UNITS: 5000 INJECTION INTRAVENOUS; SUBCUTANEOUS at 21:02

## 2022-04-03 RX ADMIN — DULOXETINE 60 MG: 30 CAPSULE, DELAYED RELEASE ORAL at 09:27

## 2022-04-03 RX ADMIN — HYDRALAZINE HYDROCHLORIDE 100 MG: 50 TABLET, FILM COATED ORAL at 13:02

## 2022-04-03 RX ADMIN — SODIUM CHLORIDE, PRESERVATIVE FREE 10 ML: 5 INJECTION INTRAVENOUS at 09:28

## 2022-04-03 RX ADMIN — LEVOTHYROXINE SODIUM 150 MCG: 75 TABLET ORAL at 06:10

## 2022-04-03 RX ADMIN — SEVELAMER CARBONATE 800 MG: 800 TABLET, FILM COATED ORAL at 13:02

## 2022-04-03 RX ADMIN — INSULIN LISPRO 5 UNITS: 100 INJECTION, SOLUTION INTRAVENOUS; SUBCUTANEOUS at 17:05

## 2022-04-03 RX ADMIN — SODIUM CHLORIDE, PRESERVATIVE FREE 10 ML: 5 INJECTION INTRAVENOUS at 21:03

## 2022-04-03 RX ADMIN — SENNOSIDES 8.6 MG: 8.6 TABLET, COATED ORAL at 09:27

## 2022-04-03 RX ADMIN — METOPROLOL SUCCINATE 100 MG: 50 TABLET, FILM COATED, EXTENDED RELEASE ORAL at 06:10

## 2022-04-03 RX ADMIN — SEVELAMER CARBONATE 800 MG: 800 TABLET, FILM COATED ORAL at 09:27

## 2022-04-03 RX ADMIN — LACTULOSE 15.33 G: 20 SOLUTION ORAL at 09:27

## 2022-04-03 RX ADMIN — ISOSORBIDE MONONITRATE 120 MG: 60 TABLET, EXTENDED RELEASE ORAL at 09:27

## 2022-04-03 RX ADMIN — SEVELAMER CARBONATE 800 MG: 800 TABLET, FILM COATED ORAL at 17:05

## 2022-04-03 RX ADMIN — HYDRALAZINE HYDROCHLORIDE 100 MG: 50 TABLET, FILM COATED ORAL at 06:10

## 2022-04-03 RX ADMIN — HEPARIN SODIUM 5000 UNITS: 5000 INJECTION INTRAVENOUS; SUBCUTANEOUS at 13:03

## 2022-04-03 RX ADMIN — LISINOPRIL 20 MG: 20 TABLET ORAL at 21:03

## 2022-04-03 RX ADMIN — ROPINIROLE HYDROCHLORIDE 4 MG: 2 TABLET, FILM COATED ORAL at 21:03

## 2022-04-03 RX ADMIN — ATORVASTATIN CALCIUM 20 MG: 20 TABLET, FILM COATED ORAL at 09:27

## 2022-04-03 RX ADMIN — LISINOPRIL 20 MG: 20 TABLET ORAL at 09:27

## 2022-04-03 RX ADMIN — HEPARIN SODIUM 5000 UNITS: 5000 INJECTION INTRAVENOUS; SUBCUTANEOUS at 06:10

## 2022-04-03 RX ADMIN — NIFEDIPINE 90 MG: 90 TABLET, FILM COATED, EXTENDED RELEASE ORAL at 09:27

## 2022-04-03 RX ADMIN — INSULIN LISPRO 5 UNITS: 100 INJECTION, SOLUTION INTRAVENOUS; SUBCUTANEOUS at 12:32

## 2022-04-03 ASSESSMENT — PAIN SCALES - GENERAL: PAINLEVEL_OUTOF10: 0

## 2022-04-03 NOTE — PROGRESS NOTES
Nephrology ([facility]) Progress Note    Patient:  Danitza Helton  YOB: 1970  Date of Service: 4/3/2022  MRN: [id number]   Acct: [de-identified]   Primary Care Physician: ROMINA Strong  Advance Directive: Full Code  Admit Date: 4/1/2022       Hospital Day: 2  Referring Provider: Rui Swanson DO    Patient Seen, Chart, Consults notes, Labs, Radiology studies reviewed. Subjective:  Patient is a 69-year-old pleasant woman with a past medical history of type 2 diabetes, hypertension and end-stage renal disease. Patient has poorly controlled diabetes and frequent episodes of severe hyperglycemia. Patient was recently admitted to [facility] for the purpose. She claims that her transportation has not been set up and patient has to skip dialysis on March 31. Is getting dialysis on Tuesdays, Thursdays and Saturdays. She became progressively dyspneic. Patient was also complaining of not being given a diabetic diet and her insulin regimen has been changed and administered appropriately. She started complaining of increasing dyspnea and she became more anxious. She presented to [facility] in [facility] and renal service was consulted. Dialysis was administered on an emergent basis. Patient did better. She is s/p dialysis on 4/2. She offers no new complaints today. Her blood pressure and blood sugars have improved.  She is now reluctant to return to her current nursing home.      Allergies:  Penicillins, Lamisil advanced [terbinafine], Reglan [metoclopramide], and Stadol [butorphanol]    Medicines:  Current Facility-Administered Medications   Medication Dose Route Frequency Provider Last Rate Last Admin    rOPINIRole (REQUIP) tablet 4 mg  4 mg Oral Nightly Rui Swanson DO   4 mg at 04/02/22 2303    atorvastatin (LIPITOR) tablet 20 mg  20 mg Oral Daily Rui Swanson DO   20 mg at 04/03/22 2984    levothyroxine (SYNTHROID) tablet 150 mcg  150 mcg Oral Daily Lorice Anjana, DO   150 mcg at 04/03/22 0610    lisinopril (PRINIVIL;ZESTRIL) tablet 20 mg  20 mg Oral BID Lorice Anjana, DO   20 mg at 04/03/22 7562    sevelamer (RENVELA) tablet 800 mg  800 mg Oral TID  Lorice Anjana, DO   800 mg at 04/03/22 1385    senna (SENOKOT) tablet 8.6 mg  1 tablet Oral Daily Lorice Anjana, DO   8.6 mg at 04/03/22 5372    isosorbide mononitrate (IMDUR) extended release tablet 120 mg  120 mg Oral Daily Lorice Anjana, DO   120 mg at 04/03/22 5621    hydrALAZINE (APRESOLINE) tablet 100 mg  100 mg Oral 3 times per day Lorice Anjana, DO   100 mg at 04/03/22 9063    metoprolol succinate (TOPROL XL) extended release tablet 100 mg  100 mg Oral QAM AC Lorice Anjana, DO   100 mg at 04/03/22 6932    NIFEdipine (PROCARDIA XL) extended release tablet 90 mg  90 mg Oral Daily Lorice Anjana, DO   90 mg at 04/03/22 5793    cloNIDine (CATAPRES) 0.1 MG/24HR 1 patch  1 patch TransDERmal Weekly Lorice Anjana, DO   1 patch at 04/01/22 2317    nicotine (NICODERM CQ) 14 MG/24HR 1 patch  1 patch TransDERmal Daily Lorice Anjana, DO   1 patch at 04/03/22 0931    lactulose (CHRONULAC) 10 GM/15ML solution 15.3333 g  15.3333 g Oral TID Lorice Anjana, DO   15.3333 g at 04/03/22 1401    insulin glargine (LANTUS) injection vial 5 Units  5 Units SubCUTAneous Nightly Lorice Anjana, DO        insulin lispro (HUMALOG) injection vial 5 Units  5 Units SubCUTAneous TID  Lorice Anjana, DO   5 Units at 04/02/22 1352    DULoxetine (CYMBALTA) extended release capsule 60 mg  60 mg Oral Daily Lorice Anjana, DO   60 mg at 04/03/22 2576    sodium chloride flush 0.9 % injection 10 mL  10 mL IntraVENous 2 times per day Lorice Anjana, DO   10 mL at 04/03/22 0633    sodium chloride flush 0.9 % injection 10 mL  10 mL IntraVENous PRN Lorice Anjana, DO        0.9 % sodium chloride infusion   IntraVENous PRN Joy Patel Scheryl Duane,         heparin (porcine) injection 5,000 Units  5,000 Units SubCUTAneous 3 times per day Kristel Natter, DO   5,000 Units at 04/03/22 9371    ondansetron (ZOFRAN-ODT) disintegrating tablet 4 mg  4 mg Oral Q8H PRN Kristel Natter, DO        Or    ondansetron [facility]) injection 4 mg  4 mg IntraVENous Q6H PRN Kristel Natter, DO        magnesium hydroxide (MILK OF MAGNESIA) 400 MG/5ML suspension 30 mL  30 mL Oral Daily PRN Kristel Natter, DO        acetaminophen (TYLENOL) tablet 650 mg  650 mg Oral Q6H PRN Kristel Natter, DO   650 mg at 04/01/22 2037    Or    acetaminophen (TYLENOL) suppository 650 mg  650 mg Rectal Q6H PRN Kristel Natter, DO        albuterol (PROVENTIL) nebulizer solution 2.5 mg  2.5 mg Nebulization Q4H PRN Kristel Natter, DO        [START ON 4/12/2022] vitamin D (ERGOCALCIFEROL) capsule 50,000 Units  50,000 Units Oral Q14 Days Kristel Natter, DO           Past Medical History:  Past Medical History:   Diagnosis Date    Arthritis     Chronic kidney disease, stage 5, kidney failure ([facility] [address]     Closed fracture of right shoulder girdle, with routine healing, subsequent encounter     DM (diabetes mellitus) type II controlled with renal manifestation ([facility] [address] 06/1993    Gastroparesis     GERD (gastroesophageal reflux disease)     Hemodialysis patient ([facility] [address]     dialysis on tues, thur, and sat at [facility] clinic    Hypertension     Hypothyroid     Nasal congestion     recent    Neuropathy     Noncompliance with medications     Palliative care patient 10/08/2019    Restless leg syndrome        Past Surgical History:  Past Surgical History:   Procedure Laterality Date    BREAST SURGERY Left 2008    infected milk gland removed    BREAST SURGERY Right 5/25/2021    WEDGE EXCISION RIGHT BREAST DUCT WITH LACRIMAL DUCT PROBE, PEC BLOCK performed by June Gaviria MD at [address] [place] [address], LAPAROSCOPIC      2008    COLONOSCOPY Left 9/17/2020    Dr Geno Khalil hepatic flexure-Severe tortuosity with severe spasming, 1 yr recall    DIALYSIS FISTULA CREATION Left 1/25/2019    REVISION LEFT UPPER EXTREMITY AV ACCESS WITH LEFT BRACHIAL ARTERY TO LEFT AXILLARY VEIN WITH  INTERPOSITIONAL ARTEGRAFT   performed by Zachary Guzmán MD at [address]  2012    [address] Right 1/25/2019    INSERTION OF RIGHT INTERNAL JUGULAR HEMODIALYSIS CATHETER performed by Zachary Guzmán MD at [address]  08/17/2018    1.  Moderately increased stainable iron identified by special stain in Kupffer cells and occasional hepatocytes. Negative for evidence of significant portal or lobular inflammation. Negative for evidence of significant fibrosis    [place] COLONOSCOPY W/BIOPSY SINGLE/MULTIPLE N/A 10/20/2017    Dr Radha Collazo prep-Tubular AP (-) dysplasia x 2--3 yr recall    [place] EGD TRANSORAL BIOPSY SINGLE/MULTIPLE N/A 12/27/2016    Dr Bia Pitts EGD TRANSORAL BIOPSY SINGLE/MULTIPLE N/A 10/20/2017    Dr Lizy Prieto (-)   [facility] beds at the [facility] VASCULAR SURGERY Left 2016    fistula by Dr Dick Bacon at [address]  10/02/2017    SJS. Left upper fistulograms/venograms, balloon angioplasty cephalic vein arch 49R94 conquest.    VASCULAR SURGERY  08/2018    FISTULOGRAM    VASCULAR SURGERY  10/29/2018    SJS. Left upper fistulograms/venograms    VASCULAR SURGERY  12/19/2018    SJS. Arch aortogram,left upper arteriograms.  VASCULAR SURGERY  03/06/2019    SJS. Removal of tunneled dialyis catheter right internal jugular vein.  VASCULAR SURGERY  08/28/2019    SJS. Left upper extremity fistulogram including venography to the superior vena cava. Balloon angioplasty left subclavian vein with 10mm x 40mm conquest balloon. Balloon angioplasty mid/proximal upper arm cephalic vein with 65NT x 40mm conquest balloon. Venograms after balloon angioplasty. Stent left mid/proximal upper arm cephalic vein stenosis fluency 10mm x 60mm self-expanding covered stent. Balloon     VASCULAR SURGERY  2019    cont angioplasty stent with 10mm x 40mm conquest balloon. Completion venograms left upper extremity. Family History  Family History   Problem Relation Age of Onset    Colon Cancer Mother          at 63    Colon Polyps Mother     Lung Cancer Father          at 79    Colon Polyps Father     Stomach Cancer Sister     Breast Cancer Sister 27    Depression Daughter 25        committed suicide    Liver Cancer Neg Hx     Liver Disease Neg Hx     Esophageal Cancer Neg Hx     Rectal Cancer Neg Hx        Social History  Social History     Socioeconomic History    Marital status: Single     Spouse name: Not on file    Number of children: 2    Years of education: Not on file    Highest education level: Not on file   Occupational History    Not on file   Tobacco Use    Smoking status: Current Every Day Smoker     Packs/day: 0.50     Years: 33.00     Pack years: 16.50     Types: Cigarettes    Smokeless tobacco: Never Used    Tobacco comment: NOW at 1/4 PD, started at age 24 and has averaged 2 PPD   Vaping Use    Vaping Use: Never used   Substance and Sexual Activity    Alcohol use: No    Drug use: Not Currently     Types: Marijuana Alyson Dasen)    Sexual activity: Not Currently     Partners: Male   Other Topics Concern    Not on file   Social History Narrative    Not on file     Social Determinants of Health     Financial Resource Strain:     Difficulty of Paying Living Expenses: Not on file   Food Insecurity:     Worried About Running Out of Food in the Last Year: Not on file    Kendy of Food in the Last Year: Not on file   Transportation Needs:     Lack of Transportation (Medical): Not on file    Lack of Transportation (Non-Medical):  Not on file   Physical Activity:     Days of Exercise per Week: Not on file    Minutes of Exercise per Session: Not on file   Stress:     Feeling of Stress : Not on file   Social Connections:     Frequency of Communication with Friends and Family: Not on file    Frequency of Social Gatherings with Friends and Family: Not on file    Attends Restoration Services: Not on file    Active Member of Clubs or Organizations: Not on file    Attends Club or Organization Meetings: Not on file    Marital Status: Not on file   Intimate Partner Violence:     Fear of Current or Ex-Partner: Not on file    Emotionally Abused: Not on file    Physically Abused: Not on file    Sexually Abused: Not on file   Housing Stability:     Unable to Pay for Housing in the Last Year: Not on file    Number of [place] in the Last Year: Not on file    Unstable Housing in the Last Year: Not on file         Review of Systems:  History obtained from chart review and the patient  General ROS: No fever or chills  Respiratory ROS: No cough, shortness of breath, wheezing  Cardiovascular ROS: no chest pain or dyspnea on exertion  Gastrointestinal ROS: No abdominal pain or melena  Genito-Urinary ROS: No dysuria or hematuria  Musculoskeletal ROS: No joint pain or swelling         Objective:  Blood pressure (!) 179/84, pulse 69, temperature 98.6 °F (37 °C), temperature source Temporal, resp. rate 16, height 5' 6\" (1.676 m), weight 161 lb (73 kg), SpO2 91 %.     Intake/Output Summary (Last 24 hours) at 4/3/2022 1111  Last data filed at 4/3/2022 1003  Gross per 24 hour   Intake 780 ml   Output 5000 ml   Net -4220 ml     General: alert and oriented x3   Chest:  clear to auscultation bilaterally without respiratory distress  CVS: regular rate and rhythm  Abdominal: soft, nontender, normal bowel sounds  Extremities: no cyanosis or edema  Skin: warm and dry without rash    Labs:  BMP:   Recent Labs     04/01/22  1230 04/02/22  0009   * 134*   K 5.7* 4.7   CL 92* 95*   CO2 22 26   BUN 45* 28*   CREATININE 5.9* 4.0*   CALCIUM 7.9* 8.0*     CBC:   Recent Labs     04/01/22  1230   WBC 3.9*   HGB 7.8*   HCT 25.5*   MCV 92.4      LIVER PROFILE:   Recent Labs     04/01/22  1230   AST 14   ALT 5   BILITOT <0.2   ALKPHOS 109*     PT/INR: No results for input(s): PROTIME, INR in the last 72 hours. APTT: No results for input(s): APTT in the last 72 hours. BNP:  No results for input(s): BNP in the last 72 hours. Ionized Calcium:No results for input(s): IONCA in the last 72 hours. Magnesium:No results for input(s): MG in the last 72 hours. Phosphorus:No results for input(s): PHOS in the last 72 hours. HgbA1C: No results for input(s): LABA1C in the last 72 hours. Hepatic:   Recent Labs     04/01/22  1230   ALKPHOS 109*   ALT 5   AST 14   PROT 5.5*   BILITOT <0.2   LABALBU 3.1*     Lactic Acid: No results for input(s): LACTA in the last 72 hours. Troponin: No results for input(s): CKTOTAL, CKMB, TROPONINT in the last 72 hours. ABGs: No results for input(s): PH, PCO2, PO2, HCO3, O2SAT in the last 72 hours. CRP:  No results for input(s): CRP in the last 72 hours. Sed Rate:  No results for input(s): SEDRATE in the last 72 hours. Cultures:   No results for input(s): CULTURE in the last 72 hours. Radiology reports as per the Radiologist  Radiology: XR CHEST PORTABLE    Result Date: 4/1/2022  XR CHEST PORTABLE 4/1/2022 12:35 PM HISTORY: Shortness of breath  Technique: Single AP view of the chest COMPARISONS: Chest exam dated 1/22/2022 FINDINGS: Cardiomegaly with central pulmonary vascular congestion, interstitial and alveolar edema as well as bilateral moderate layering pleural effusions. No pneumothorax. No acute bony abnormality. 1. Volume overload with interstitial and carie pulmonary edema as well as bilateral moderate pleural effusions. Signed by Dr Brook Urias     1. End-stage renal disease  2. Type 2 diabetes with renal disease with severe hyperglycemia  3. Hypertension  4. Hyperkalemia  5. Hyponatremia  6. Anemia of chronic kidney disease      Plan:  Dialysis is due next April 5. Follow-up labs. Continue low potassium diet.

## 2022-04-03 NOTE — PROGRESS NOTES
No significant clinical change. Continue supportive care. I discussed with , and she is to go to a different skilled nursing facility. This process will begin Monday.

## 2022-04-04 LAB
GLUCOSE BLD-MCNC: 137 MG/DL (ref 70–99)
GLUCOSE BLD-MCNC: 178 MG/DL (ref 70–99)
GLUCOSE BLD-MCNC: 191 MG/DL (ref 70–99)
GLUCOSE BLD-MCNC: 290 MG/DL (ref 70–99)
PERFORMED ON: ABNORMAL

## 2022-04-04 PROCEDURE — 6370000000 HC RX 637 (ALT 250 FOR IP): Performed by: INTERNAL MEDICINE

## 2022-04-04 PROCEDURE — 82947 ASSAY GLUCOSE BLOOD QUANT: CPT

## 2022-04-04 PROCEDURE — G0378 HOSPITAL OBSERVATION PER HR: HCPCS

## 2022-04-04 PROCEDURE — 6360000002 HC RX W HCPCS: Performed by: INTERNAL MEDICINE

## 2022-04-04 PROCEDURE — 96372 THER/PROPH/DIAG INJ SC/IM: CPT

## 2022-04-04 PROCEDURE — 2580000003 HC RX 258: Performed by: INTERNAL MEDICINE

## 2022-04-04 RX ADMIN — METOPROLOL SUCCINATE 100 MG: 50 TABLET, FILM COATED, EXTENDED RELEASE ORAL at 06:16

## 2022-04-04 RX ADMIN — SEVELAMER CARBONATE 800 MG: 800 TABLET, FILM COATED ORAL at 11:28

## 2022-04-04 RX ADMIN — HEPARIN SODIUM 5000 UNITS: 5000 INJECTION INTRAVENOUS; SUBCUTANEOUS at 13:59

## 2022-04-04 RX ADMIN — SEVELAMER CARBONATE 800 MG: 800 TABLET, FILM COATED ORAL at 08:52

## 2022-04-04 RX ADMIN — SODIUM CHLORIDE, PRESERVATIVE FREE 10 ML: 5 INJECTION INTRAVENOUS at 21:10

## 2022-04-04 RX ADMIN — INSULIN GLARGINE 5 UNITS: 100 INJECTION, SOLUTION SUBCUTANEOUS at 21:24

## 2022-04-04 RX ADMIN — HYDRALAZINE HYDROCHLORIDE 100 MG: 50 TABLET, FILM COATED ORAL at 06:17

## 2022-04-04 RX ADMIN — NIFEDIPINE 90 MG: 90 TABLET, FILM COATED, EXTENDED RELEASE ORAL at 08:52

## 2022-04-04 RX ADMIN — HEPARIN SODIUM 5000 UNITS: 5000 INJECTION INTRAVENOUS; SUBCUTANEOUS at 21:10

## 2022-04-04 RX ADMIN — ISOSORBIDE MONONITRATE 120 MG: 60 TABLET, EXTENDED RELEASE ORAL at 08:52

## 2022-04-04 RX ADMIN — LISINOPRIL 20 MG: 20 TABLET ORAL at 21:09

## 2022-04-04 RX ADMIN — LISINOPRIL 20 MG: 20 TABLET ORAL at 08:52

## 2022-04-04 RX ADMIN — ROPINIROLE HYDROCHLORIDE 4 MG: 2 TABLET, FILM COATED ORAL at 21:09

## 2022-04-04 RX ADMIN — SODIUM CHLORIDE, PRESERVATIVE FREE 10 ML: 5 INJECTION INTRAVENOUS at 08:53

## 2022-04-04 RX ADMIN — LEVOTHYROXINE SODIUM 150 MCG: 75 TABLET ORAL at 06:16

## 2022-04-04 RX ADMIN — ACETAMINOPHEN 650 MG: 325 TABLET ORAL at 21:09

## 2022-04-04 RX ADMIN — HYDRALAZINE HYDROCHLORIDE 100 MG: 50 TABLET, FILM COATED ORAL at 21:09

## 2022-04-04 RX ADMIN — HEPARIN SODIUM 5000 UNITS: 5000 INJECTION INTRAVENOUS; SUBCUTANEOUS at 06:17

## 2022-04-04 RX ADMIN — LACTULOSE 15.33 G: 20 SOLUTION ORAL at 21:08

## 2022-04-04 RX ADMIN — SENNOSIDES 8.6 MG: 8.6 TABLET, COATED ORAL at 08:52

## 2022-04-04 RX ADMIN — LACTULOSE 15.33 G: 20 SOLUTION ORAL at 13:59

## 2022-04-04 RX ADMIN — DULOXETINE 60 MG: 30 CAPSULE, DELAYED RELEASE ORAL at 08:52

## 2022-04-04 RX ADMIN — EPOETIN ALFA-EPBX 10000 UNITS: 10000 INJECTION, SOLUTION INTRAVENOUS; SUBCUTANEOUS at 11:28

## 2022-04-04 RX ADMIN — ATORVASTATIN CALCIUM 20 MG: 20 TABLET, FILM COATED ORAL at 08:52

## 2022-04-04 RX ADMIN — HYDRALAZINE HYDROCHLORIDE 100 MG: 50 TABLET, FILM COATED ORAL at 13:59

## 2022-04-04 RX ADMIN — ACETAMINOPHEN 650 MG: 325 TABLET ORAL at 14:02

## 2022-04-04 RX ADMIN — LACTULOSE 15.33 G: 20 SOLUTION ORAL at 08:53

## 2022-04-04 RX ADMIN — SEVELAMER CARBONATE 800 MG: 800 TABLET, FILM COATED ORAL at 17:23

## 2022-04-04 RX ADMIN — INSULIN LISPRO 5 UNITS: 100 INJECTION, SOLUTION INTRAVENOUS; SUBCUTANEOUS at 17:23

## 2022-04-04 ASSESSMENT — PAIN SCALES - GENERAL
PAINLEVEL_OUTOF10: 6
PAINLEVEL_OUTOF10: 6
PAINLEVEL_OUTOF10: 0

## 2022-04-04 NOTE — PROGRESS NOTES
Length of Stay:   LOS: 3 days      Chief complaint:  Chief Complaint   Patient presents with    Hyperglycemia     SNF not giving insulin, accu check HI given 16 units of insulin.  Other     missed dialysis, fluid overload         Subjective:  Resting in bed, feeling better    Physical Examination:  BP (!) 175/70   Pulse 71   Temp 97.9 °F (36.6 °C) (Temporal)   Resp 18   Ht 5' 6\" (1.676 m)   Wt 162 lb 3 oz (73.6 kg)   SpO2 96%   BMI 26.18 kg/m²   Constitutional - Appropriately groomed, good nutritional status  Psychiatric - AOX3, baseline mood  Eyes - EOMI, conjunctivae and lids intact  ENMT - lips, teeth, gums intact; hearing intact  Respiratory - CTAB, no accessory muscle use  Cardiovascular - Clear S1, S2 no gross m/r/g; pedal pulses intact  Abd - Soft, non-tender;  No gross hernia  MSK - UE and LE joints intact, no gross clubbing  Skin - No rashes or wounds; no gross capillary refill defects  Neurologic - CN 2-12 grossly intact, sensation intact    Medications:    epoetin tish-epbx, 10,000 Units, SubCUTAneous, Once per day on Mon Wed Fri    rOPINIRole, 4 mg, Oral, Nightly    atorvastatin, 20 mg, Oral, Daily    levothyroxine, 150 mcg, Oral, Daily    lisinopril, 20 mg, Oral, BID    sevelamer, 800 mg, Oral, TID WC    senna, 1 tablet, Oral, Daily    isosorbide mononitrate, 120 mg, Oral, Daily    hydrALAZINE, 100 mg, Oral, 3 times per day    metoprolol succinate, 100 mg, Oral, QAM AC    NIFEdipine, 90 mg, Oral, Daily    cloNIDine, 1 patch, TransDERmal, Weekly    nicotine, 1 patch, TransDERmal, Daily    lactulose, 15.3333 g, Oral, TID    insulin glargine, 5 Units, SubCUTAneous, Nightly    insulin lispro, 5 Units, SubCUTAneous, TID WC    DULoxetine, 60 mg, Oral, Daily    sodium chloride flush, 10 mL, IntraVENous, 2 times per day    heparin (porcine), 5,000 Units, SubCUTAneous, 3 times per day    [START ON 4/12/2022] vitamin D, 50,000 Units, Oral, Q14 Days      Problem list:  Principal Problem:    Volume overload  Resolved Problems:    * No resolved hospital problems. *        A/P:  []End-stage renal disease on maintenance dialysis.   -History of gross non-compliance  -Renal following  Improving fluid status    []DM2  -Poorly controlled, poor medication, diet compliance    Encouraged to improve compliance, high risk of readmission         Son Clifton M.D.,  4/4/2022

## 2022-04-04 NOTE — PLAN OF CARE
Problem: Skin Integrity:  Goal: Will show no infection signs and symptoms  Description: Will show no infection signs and symptoms  4/3/2022 2332 by Angeli Yates RN  Outcome: Ongoing  4/3/2022 2326 by Angeli Yates RN  Outcome: Ongoing  4/3/2022 1656 by Pily Bucio RN  Outcome: Ongoing  Goal: Absence of new skin breakdown  Description: Absence of new skin breakdown  4/3/2022 2332 by Angeli Yates RN  Outcome: Ongoing  4/3/2022 2326 by Angeli Yates RN  Outcome: Ongoing  4/3/2022 1656 by Pily Bucio RN  Outcome: Ongoing     Problem: Falls - Risk of:  Goal: Will remain free from falls  Description: Will remain free from falls  4/3/2022 2332 by Angeli Yates RN  Outcome: Ongoing  4/3/2022 2326 by Angeli Yates RN  Outcome: Ongoing  4/3/2022 1656 by Pily Bucio RN  Outcome: Ongoing  Goal: Absence of physical injury  Description: Absence of physical injury  4/3/2022 2332 by Angeli Yates RN  Outcome: Ongoing  4/3/2022 2326 by Angeli Yates RN  Outcome: Ongoing  4/3/2022 1656 by Pily Bucio RN  Outcome: Ongoing

## 2022-04-04 NOTE — PLAN OF CARE
Problem: Skin Integrity:  Goal: Will show no infection signs and symptoms  Description: Will show no infection signs and symptoms  4/3/2022 2326 by Jaquan Mariee RN  Outcome: Ongoing  4/3/2022 1656 by Bhumi Reyes RN  Outcome: Ongoing  Goal: Absence of new skin breakdown  Description: Absence of new skin breakdown  4/3/2022 2326 by Jaquan Mariee RN  Outcome: Ongoing  4/3/2022 1656 by Bhumi Reyes RN  Outcome: Ongoing     Problem: Falls - Risk of:  Goal: Will remain free from falls  Description: Will remain free from falls  4/3/2022 2326 by Jaquan Mariee RN  Outcome: Ongoing  4/3/2022 1656 by Bhumi Reyes RN  Outcome: Ongoing  Goal: Absence of physical injury  Description: Absence of physical injury  4/3/2022 2326 by Jaquan Mariee RN  Outcome: Ongoing  4/3/2022 1656 by Bhumi Reyes RN  Outcome: Ongoing

## 2022-04-04 NOTE — PROGRESS NOTES
Nephrology ([facility]) Progress Note    Patient:  James Shah  YOB: 1970  Date of Service: 4/4/2022  MRN: [id number]   Acct: [de-identified]   Primary Care Physician: ROMINA Hewitt  Advance Directive: Full Code  Admit Date: 4/1/2022       Hospital Day: 3  Referring Provider: Hadley Sandy MD    Patient independently seen and examined, Chart, Consults, Notes, Operative notes, Labs, Cardiology, and Radiology studies reviewed as available. Chief complaint: Abnormal labs. Subjective:  James Shah is a 47 y.o. female for whom we were consulted for evaluation and treatment of end-stage renal disease. Patient has end-stage renal disease due to diabetic nephropathy. She has poorly controlled type 2 diabetes as has been noncompliant. Patient is currently living in a nursing home and get dialysis at [address] [place] [address] Tuesday Thursday Saturday. Presented with increasing shortness of breath dyspnea on exertion and volume overloaded. Hospital course markable for diagnosis of profound hyperglycemia. She has received emergent dialysis treatment for correction of volume status. Apparently she has not been receiving any insulin at the current nursing home. This morning she is alert and awake and upset nursing home not providing any insulin to her.     Allergies:  Penicillins, Lamisil advanced [terbinafine], Reglan [metoclopramide], and Stadol [butorphanol]    Medicines:  Current Facility-Administered Medications   Medication Dose Route Frequency Provider Last Rate Last Admin    rOPINIRole (REQUIP) tablet 4 mg  4 mg Oral Nightly Fannie Sauk Centre, DO   4 mg at 04/03/22 2103    atorvastatin (LIPITOR) tablet 20 mg  20 mg Oral Daily Fannie Sauk Centre, DO   20 mg at 04/04/22 1937    levothyroxine (SYNTHROID) tablet 150 mcg  150 mcg Oral Daily Fannie Sauk Centre, DO   150 mcg at 04/04/22 6601    lisinopril (PRINIVIL;ZESTRIL) tablet 20 mg  20 mg Oral BID Fannie Sauk Centre, DO 20 mg at 04/04/22 0852    sevelamer (RENVELA) tablet 800 mg  800 mg Oral TID  Reatha Bark, DO   800 mg at 04/04/22 7978    senna (SENOKOT) tablet 8.6 mg  1 tablet Oral Daily Reatha Bark, DO   8.6 mg at 04/04/22 9629    isosorbide mononitrate (IMDUR) extended release tablet 120 mg  120 mg Oral Daily Reatha Bark, DO   120 mg at 04/04/22 7072    hydrALAZINE (APRESOLINE) tablet 100 mg  100 mg Oral 3 times per day Reatha Bark, DO   100 mg at 04/04/22 7123    metoprolol succinate (TOPROL XL) extended release tablet 100 mg  100 mg Oral QAM AC Reatha Bark, DO   100 mg at 04/04/22 1211    NIFEdipine (PROCARDIA XL) extended release tablet 90 mg  90 mg Oral Daily Reatha Bark, DO   90 mg at 04/04/22 6255    cloNIDine (CATAPRES) 0.1 MG/24HR 1 patch  1 patch TransDERmal Weekly Reatha Bark, DO   1 patch at 04/01/22 2317    nicotine (NICODERM CQ) 14 MG/24HR 1 patch  1 patch TransDERmal Daily Reatha Bark, DO   1 patch at 04/04/22 0854    lactulose (CHRONULAC) 10 GM/15ML solution 15.3333 g  15.3333 g Oral TID Reatha Bark, DO   15.3333 g at 04/04/22 0853    insulin glargine (LANTUS) injection vial 5 Units  5 Units SubCUTAneous Nightly Reatha Bark, DO        insulin lispro (HUMALOG) injection vial 5 Units  5 Units SubCUTAneous TID  Reatha Bark, DO   5 Units at 04/03/22 1705    DULoxetine (CYMBALTA) extended release capsule 60 mg  60 mg Oral Daily Reatha Bark, DO   60 mg at 04/04/22 9236    sodium chloride flush 0.9 % injection 10 mL  10 mL IntraVENous 2 times per day Reatha Bark, DO   10 mL at 04/04/22 9795    sodium chloride flush 0.9 % injection 10 mL  10 mL IntraVENous PRN Reatha Bark, DO        0.9 % sodium chloride infusion   IntraVENous PRN Reatha Bark, DO        heparin (porcine) injection 5,000 Units  5,000 Units SubCUTAneous 3 times per day Kelsey Martínez DO   5,000 Units at 04/04/22 0617    ondansetron (ZOFRAN-ODT) disintegrating tablet 4 mg  4 mg Oral Q8H PRN Jillian Gazella, DO        Or    ondansetron [facility]) injection 4 mg  4 mg IntraVENous Q6H PRN Jillian Gazella, DO        magnesium hydroxide (MILK OF MAGNESIA) 400 MG/5ML suspension 30 mL  30 mL Oral Daily PRN Jillian Gazella, DO        acetaminophen (TYLENOL) tablet 650 mg  650 mg Oral Q6H PRN Jillian Gazella, DO   650 mg at 04/01/22 2037    Or    acetaminophen (TYLENOL) suppository 650 mg  650 mg Rectal Q6H PRN Jillian Gazella, DO        albuterol (PROVENTIL) nebulizer solution 2.5 mg  2.5 mg Nebulization Q4H PRN Jillian Gazella, DO        [START ON 4/12/2022] vitamin D (ERGOCALCIFEROL) capsule 50,000 Units  50,000 Units Oral Q14 Days Jillian Gazella, DO           Past Medical History:  Past Medical History:   Diagnosis Date    Arthritis     Chronic kidney disease, stage 5, kidney failure (HCC)     Closed fracture of right shoulder girdle, with routine healing, subsequent encounter     DM (diabetes mellitus) type II controlled with renal manifestation ([facility] [address] 06/1993    Gastroparesis     GERD (gastroesophageal reflux disease)     Hemodialysis patient ([facility] [address] [facility]     dialysis on tues, thur, and sat at [facility]    Hypertension     Hypothyroid     Nasal congestion     recent    Neuropathy     Noncompliance with medications     Palliative care patient 10/08/2019    Restless leg syndrome        Past Surgical History:  Past Surgical History:   Procedure Laterality Date    BREAST SURGERY Left 2008    infected milk gland removed    BREAST SURGERY Right 5/25/2021    WEDGE EXCISION RIGHT BREAST DUCT WITH LACRIMAL DUCT PROBE, PEC BLOCK performed by Janak Ferreira MD at [address] [place] [address], LAPAROSCOPIC      2008    COLONOSCOPY Left 9/17/2020    Dr Steffen Carey hepatic flexure-Severe tortuosity with severe spasming, 1 yr recall    DIALYSIS FISTULA CREATION Left 1/25/2019 REVISION LEFT UPPER EXTREMITY AV ACCESS WITH LEFT BRACHIAL ARTERY TO LEFT AXILLARY VEIN WITH  INTERPOSITIONAL ARTEGRAFT   performed by Judit Tinoco MD at [address]  2012    [address] Right 1/25/2019    INSERTION OF RIGHT INTERNAL JUGULAR HEMODIALYSIS CATHETER performed by Judit Tinoco MD at [facility]  08/17/2018    1.  Moderately increased stainable iron identified by special stain in Kupffer cells and occasional hepatocytes. Negative for evidence of significant portal or lobular inflammation. Negative for evidence of significant fibrosis    MA COLONOSCOPY W/BIOPSY SINGLE/MULTIPLE N/A 10/20/2017    Dr Omar Ochoa prep-Tubular AP (-) dysplasia x 2--3 yr recall    MA EGD TRANSORAL BIOPSY SINGLE/MULTIPLE N/A 12/27/2016    Dr Abbey Ferris EGD TRANSORAL BIOPSY SINGLE/MULTIPLE N/A 10/20/2017    Dr Elen Larsen (-)   [facility] VASCULAR SURGERY Left 2016    fistula by Dr Wilhemina Osler at [address]  10/02/2017    [facility]. Left upper fistulograms/venograms, balloon angioplasty cephalic vein arch 28S22 conquest.    VASCULAR SURGERY  08/2018    FISTULOGRAM    VASCULAR SURGERY  10/29/2018    [facility]. Left upper fistulograms/venograms    VASCULAR SURGERY  12/19/2018    [facility]. Arch aortogram,left upper arteriograms.  VASCULAR SURGERY  03/06/2019    [facility]. Removal of tunneled dialyis catheter right internal jugular vein.  VASCULAR SURGERY  08/28/2019    [facility]. Left upper extremity fistulogram including venography to the superior vena cava. Balloon angioplasty left subclavian vein with 10mm x 40mm conquest balloon. Balloon angioplasty mid/proximal upper arm cephalic vein with 09EC x 40mm conquest balloon. Venograms after balloon angioplasty. Stent left mid/proximal upper arm cephalic vein stenosis fluency 10mm x 60mm self-expanding covered stent. Balloon     VASCULAR SURGERY  08/28/2019    cont angioplasty stent with 10mm x 40mm conquest balloon. Completion venograms left upper extremity. Family History  Family History   Problem Relation Age of Onset    Colon Cancer Mother          at 63    Colon Polyps Mother     Lung Cancer Father          at 79    Colon Polyps Father     Stomach Cancer Sister     Breast Cancer Sister 27    Depression Daughter 25        committed suicide    Liver Cancer Neg Hx     Liver Disease Neg Hx     Esophageal Cancer Neg Hx     Rectal Cancer Neg Hx        Social History  Social History     Socioeconomic History    Marital status: Single     Spouse name: Not on file    Number of children: 2    Years of education: Not on file    Highest education level: Not on file   Occupational History    Not on file   Tobacco Use    Smoking status: Current Every Day Smoker     Packs/day: 0.50     Years: 33.00     Pack years: 16.50     Types: Cigarettes    Smokeless tobacco: Never Used    Tobacco comment: NOW at 1/4 PD, started at age 24 and has averaged 2 PPD   Vaping Use    Vaping Use: Never used   Substance and Sexual Activity    Alcohol use: No    Drug use: Not Currently     Types: Marijuana Curlie Opal)    Sexual activity: Not Currently     Partners: Male   Other Topics Concern    Not on file   Social History Narrative    Not on file     Social Determinants of Health     Financial Resource Strain:     Difficulty of Paying Living Expenses: Not on file   Food Insecurity:     Worried About Running Out of Food in the Last Year: Not on file    Kendy of Food in the Last Year: Not on file   Transportation Needs:     Lack of Transportation (Medical): Not on file    Lack of Transportation (Non-Medical):  Not on file   Physical Activity:     Days of Exercise per Week: Not on file    Minutes of Exercise per Session: Not on file   Stress:     Feeling of Stress : Not on file   Social Connections:     Frequency of Communication with Friends and Family: Not on file    Frequency of Social Gatherings with Friends and Family: Not on file    Attends Congregational Services: Not on file    Active Member of Clubs or Organizations: Not on file    Attends Club or Organization Meetings: Not on file    Marital Status: Not on file   Intimate Partner Violence:     Fear of Current or Ex-Partner: Not on file    Emotionally Abused: Not on file    Physically Abused: Not on file    Sexually Abused: Not on file   Housing Stability:     Unable to Pay for Housing in the Last Year: Not on file    Number of Places Lived in the Last Year: Not on file    Unstable Housing in the Last Year: Not on file         Review of Systems:  History obtained from chart review and the patient  General ROS: No fever or chills  Respiratory ROS: No cough, shortness of breath, wheezing  Cardiovascular ROS: No chest pain or palpitations  Gastrointestinal ROS: No abdominal pain or melena  Genito-Urinary ROS: No dysuria or hematuria  Musculoskeletal ROS: No joint pain or swelling         Objective:  Patient Vitals for the past 24 hrs:   BP Temp Temp src Pulse Resp SpO2 Weight   04/04/22 0737 -- -- -- -- -- 95 % --   04/04/22 0606 (!) 176/88 -- -- -- -- -- --   04/04/22 0604 (!) 187/80 98.6 °F (37 °C) Temporal 68 16 92 % --   04/04/22 0037 (!) 142/61 97.7 °F (36.5 °C) Temporal 62 18 95 % 162 lb 3 oz (73.6 kg)   04/03/22 1845 (!) 145/76 97.1 °F (36.2 °C) -- 63 16 98 % --   04/03/22 1230 (!) 145/53 96 °F (35.6 °C) -- 54 12 93 % --       Intake/Output Summary (Last 24 hours) at 4/4/2022 1024  Last data filed at 4/3/2022 1634  Gross per 24 hour   Intake 480 ml   Output --   Net 480 ml     General: awake/alert    HEENT: Normocephalic atraumatic head  Neck: Supple with no JVD. Chest:  clear to auscultation bilaterally  CVS: regular rate and rhythm  Abdominal: soft, nontender, normal bowel sounds  Extremities: 2+ pedal edema.   Skin: warm and dry without rash    Labs:  BMP:   Recent Labs     04/01/22  1230 04/02/22  0009   * 134*   K 5.7* 4.7   CL 92* 95* CO2 22 26   BUN 45* 28*   CREATININE 5.9* 4.0*   CALCIUM 7.9* 8.0*     CBC:   Recent Labs     04/01/22  1230   WBC 3.9*   HGB 7.8*   HCT 25.5*   MCV 92.4        LIVER PROFILE:   Recent Labs     04/01/22  1230   AST 14   ALT 5   BILITOT <0.2   ALKPHOS 109*     PT/INR: No results for input(s): PROTIME, INR in the last 72 hours. APTT: No results for input(s): APTT in the last 72 hours. BNP:  No results for input(s): BNP in the last 72 hours. Ionized Calcium:No results for input(s): IONCA in the last 72 hours. Magnesium:No results for input(s): MG in the last 72 hours. Phosphorus:No results for input(s): PHOS in the last 72 hours. HgbA1C: No results for input(s): LABA1C in the last 72 hours. Hepatic:   Recent Labs     04/01/22  1230   ALKPHOS 109*   ALT 5   AST 14   PROT 5.5*   BILITOT <0.2   LABALBU 3.1*     Lactic Acid: No results for input(s): LACTA in the last 72 hours. Troponin: No results for input(s): CKTOTAL, CKMB, TROPONINT in the last 72 hours. ABGs:   Lab Results   Component Value Date    PHART 7.340 01/01/2022    PO2ART 57.0 01/01/2022    WKY5AXY 44.0 01/01/2022     CRP:  No results for input(s): CRP in the last 72 hours. Sed Rate:  No results for input(s): SEDRATE in the last 72 hours. Culture Results:   Blood Culture Recent: No results for input(s): BC in the last 720 hours. Urine Culture Recent : No results for input(s): LABURIN in the last 720 hours. Radiology reports as per the Radiologist  Radiology: XR CHEST PORTABLE    Result Date: 4/1/2022  XR CHEST PORTABLE 4/1/2022 12:35 PM HISTORY: Shortness of breath  Technique: Single AP view of the chest COMPARISONS: Chest exam dated 1/22/2022 FINDINGS: Cardiomegaly with central pulmonary vascular congestion, interstitial and alveolar edema as well as bilateral moderate layering pleural effusions. No pneumothorax. No acute bony abnormality.     1. Volume overload with interstitial and carie pulmonary edema as well as bilateral moderate pleural effusions. Signed by Dr Madalyn Hilliard   1. End-stage renal disease on maintenance dialysis. 2.  Type II diabetic nephropathy. 3.  Volume overload resolved. 4.  Anemia of chronic kidney disease. 5.  Poorly controlled type 2 diabetes. 6.  Poorly controlled hypertension. 7.  Noncompliant dialysis patient      Plan:  1. Adjust blood pressure medications  2. Start LANDON . 3.  Counseling about compliance.     Radhika Shay MD  04/04/22  10:24 AM

## 2022-04-04 NOTE — CARE COORDINATION
Spoke with pt about dc plans. Pt came from [facility]. Pt does want to return there due to not getting to go to HD on 3/31 as per normal schedule. Pt had been in [facility] from 3/24-3/30. Pt dc'd late to facility on 3/30, and transport had not been set up for the following day. Pt would like to return to her own home. She states she is having a friend move in with her to help out. Pt will be going back to her primary HD clinic [facility]. Her schedule there is TTS at 1210. PT has everything at home she needs, but will need HH. Will need to get transportation arranged thru [facility].    Electronically signed by Bridgett Blood RN on 4/4/2022 at 3:00 PM

## 2022-04-05 VITALS
OXYGEN SATURATION: 98 % | SYSTOLIC BLOOD PRESSURE: 203 MMHG | TEMPERATURE: 95.9 F | BODY MASS INDEX: 26.07 KG/M2 | HEIGHT: 66 IN | DIASTOLIC BLOOD PRESSURE: 81 MMHG | WEIGHT: 162.19 LBS | HEART RATE: 65 BPM | RESPIRATION RATE: 18 BRPM

## 2022-04-05 LAB
ALBUMIN SERPL-MCNC: 3.4 G/DL (ref 3.5–5.2)
ALP BLD-CCNC: 98 U/L (ref 35–104)
ALT SERPL-CCNC: <5 U/L (ref 5–33)
ANION GAP SERPL CALCULATED.3IONS-SCNC: 19 MMOL/L (ref 7–19)
AST SERPL-CCNC: 11 U/L (ref 5–32)
BASOPHILS ABSOLUTE: 0.1 K/UL (ref 0–0.2)
BASOPHILS RELATIVE PERCENT: 3 % (ref 0–1)
BILIRUB SERPL-MCNC: 0.3 MG/DL (ref 0.2–1.2)
BUN BLDV-MCNC: 37 MG/DL (ref 6–20)
CALCIUM SERPL-MCNC: 8.7 MG/DL (ref 8.6–10)
CHLORIDE BLD-SCNC: 94 MMOL/L (ref 98–111)
CO2: 22 MMOL/L (ref 22–29)
CREAT SERPL-MCNC: 5.1 MG/DL (ref 0.5–0.9)
EOSINOPHILS ABSOLUTE: 0.3 K/UL (ref 0–0.6)
EOSINOPHILS RELATIVE PERCENT: 6.8 % (ref 0–5)
GFR AFRICAN AMERICAN: 11
GFR NON-AFRICAN AMERICAN: 9
GLUCOSE BLD-MCNC: 112 MG/DL (ref 70–99)
GLUCOSE BLD-MCNC: 125 MG/DL (ref 70–99)
GLUCOSE BLD-MCNC: 230 MG/DL (ref 74–109)
GLUCOSE BLD-MCNC: 235 MG/DL (ref 70–99)
HCT VFR BLD CALC: 29.1 % (ref 37–47)
HEMOGLOBIN: 8.8 G/DL (ref 12–16)
IMMATURE GRANULOCYTES #: 0 K/UL
LYMPHOCYTES ABSOLUTE: 1.1 K/UL (ref 1.1–4.5)
LYMPHOCYTES RELATIVE PERCENT: 27.1 % (ref 20–40)
MAGNESIUM: 2.5 MG/DL (ref 1.6–2.6)
MCH RBC QN AUTO: 28.4 PG (ref 27–31)
MCHC RBC AUTO-ENTMCNC: 30.2 G/DL (ref 33–37)
MCV RBC AUTO: 93.9 FL (ref 81–99)
MONOCYTES ABSOLUTE: 0.3 K/UL (ref 0–0.9)
MONOCYTES RELATIVE PERCENT: 7.8 % (ref 0–10)
NEUTROPHILS ABSOLUTE: 2.2 K/UL (ref 1.5–7.5)
NEUTROPHILS RELATIVE PERCENT: 55 % (ref 50–65)
PDW BLD-RTO: 17.8 % (ref 11.5–14.5)
PERFORMED ON: ABNORMAL
PLATELET # BLD: 173 K/UL (ref 130–400)
PMV BLD AUTO: 10.1 FL (ref 9.4–12.3)
POTASSIUM SERPL-SCNC: 4 MMOL/L (ref 3.5–5)
POTASSIUM SERPL-SCNC: 5.6 MMOL/L (ref 3.5–5)
RBC # BLD: 3.1 M/UL (ref 4.2–5.4)
SODIUM BLD-SCNC: 135 MMOL/L (ref 136–145)
TOTAL PROTEIN: 5.9 G/DL (ref 6.6–8.7)
WBC # BLD: 4 K/UL (ref 4.8–10.8)

## 2022-04-05 PROCEDURE — 6370000000 HC RX 637 (ALT 250 FOR IP): Performed by: INTERNAL MEDICINE

## 2022-04-05 PROCEDURE — 83735 ASSAY OF MAGNESIUM: CPT

## 2022-04-05 PROCEDURE — 8010000000 HC HEMODIALYSIS ACUTE INPT

## 2022-04-05 PROCEDURE — 80053 COMPREHEN METABOLIC PANEL: CPT

## 2022-04-05 PROCEDURE — 96372 THER/PROPH/DIAG INJ SC/IM: CPT

## 2022-04-05 PROCEDURE — 2700000000 HC OXYGEN THERAPY PER DAY

## 2022-04-05 PROCEDURE — 85025 COMPLETE CBC W/AUTO DIFF WBC: CPT

## 2022-04-05 PROCEDURE — 6360000002 HC RX W HCPCS: Performed by: INTERNAL MEDICINE

## 2022-04-05 PROCEDURE — G0378 HOSPITAL OBSERVATION PER HR: HCPCS

## 2022-04-05 PROCEDURE — 36415 COLL VENOUS BLD VENIPUNCTURE: CPT

## 2022-04-05 PROCEDURE — 82947 ASSAY GLUCOSE BLOOD QUANT: CPT

## 2022-04-05 PROCEDURE — 2580000003 HC RX 258: Performed by: INTERNAL MEDICINE

## 2022-04-05 PROCEDURE — 84132 ASSAY OF SERUM POTASSIUM: CPT

## 2022-04-05 RX ADMIN — METOPROLOL SUCCINATE 100 MG: 50 TABLET, FILM COATED, EXTENDED RELEASE ORAL at 06:47

## 2022-04-05 RX ADMIN — SODIUM CHLORIDE, PRESERVATIVE FREE 10 ML: 5 INJECTION INTRAVENOUS at 12:28

## 2022-04-05 RX ADMIN — HEPARIN SODIUM 5000 UNITS: 5000 INJECTION INTRAVENOUS; SUBCUTANEOUS at 06:46

## 2022-04-05 RX ADMIN — NIFEDIPINE 90 MG: 90 TABLET, FILM COATED, EXTENDED RELEASE ORAL at 12:24

## 2022-04-05 RX ADMIN — HYDRALAZINE HYDROCHLORIDE 100 MG: 50 TABLET, FILM COATED ORAL at 14:49

## 2022-04-05 RX ADMIN — HEPARIN SODIUM 5000 UNITS: 5000 INJECTION INTRAVENOUS; SUBCUTANEOUS at 14:48

## 2022-04-05 RX ADMIN — ISOSORBIDE MONONITRATE 120 MG: 60 TABLET, EXTENDED RELEASE ORAL at 12:25

## 2022-04-05 RX ADMIN — HYDRALAZINE HYDROCHLORIDE 100 MG: 50 TABLET, FILM COATED ORAL at 06:47

## 2022-04-05 RX ADMIN — SEVELAMER CARBONATE 800 MG: 800 TABLET, FILM COATED ORAL at 12:24

## 2022-04-05 RX ADMIN — ATORVASTATIN CALCIUM 20 MG: 20 TABLET, FILM COATED ORAL at 12:25

## 2022-04-05 RX ADMIN — DULOXETINE 60 MG: 30 CAPSULE, DELAYED RELEASE ORAL at 12:25

## 2022-04-05 RX ADMIN — INSULIN LISPRO 5 UNITS: 100 INJECTION, SOLUTION INTRAVENOUS; SUBCUTANEOUS at 08:15

## 2022-04-05 RX ADMIN — LISINOPRIL 20 MG: 20 TABLET ORAL at 12:25

## 2022-04-05 RX ADMIN — LEVOTHYROXINE SODIUM 150 MCG: 75 TABLET ORAL at 06:47

## 2022-04-05 RX ADMIN — LACTULOSE 15.33 G: 20 SOLUTION ORAL at 12:26

## 2022-04-05 RX ADMIN — SEVELAMER CARBONATE 800 MG: 800 TABLET, FILM COATED ORAL at 17:45

## 2022-04-05 ASSESSMENT — PAIN SCALES - GENERAL
PAINLEVEL_OUTOF10: 0
PAINLEVEL_OUTOF10: 0

## 2022-04-05 NOTE — DISCHARGE SUMMARY
Admission Diagnosis:  Chief Complaint   Patient presents with    Hyperglycemia     SNF not giving insulin, accu check HI given 16 units of insulin.  Other     missed dialysis, fluid overload       Comorbid conditions:  Principal Problem:    Volume overload  Resolved Problems:    * No resolved hospital problems. *      Procedures Performed:  None    Hospital Course: The patient is a 47 y.o. female presents with shortness of breath, volume overload, hypoxia. She missed her dialysis on 3/31. She presents with the above-noted symptoms and is in fluid overload. Sadly, she has spent the majority of January and February in and out of [facility]. She is debilitated and has a poor quality of life. Pt is grossly noncompliant. Refusing SNF placement. States wants to go home, cleared for DC home at this time by Renal.  Will DC per her wishes. Advised to improve compliance and that poor compliance can lead to death. Very poor compliance with DM2 and ESRD.       DC Medications:  See Home medication reconciliation    Condition on Discharge:  Stable    Disposition:  DC home    Recommendations/Follow-up:  -F/u PCP within 3 days  F/u Renal    Mauricio Allison M.D.,  4/5/2022

## 2022-04-05 NOTE — PROGRESS NOTES
Still waiting on lab to collect potassium level. [facility] called lab patient pending discharge, need K+ level first.  Stated would rely message.

## 2022-04-05 NOTE — PROGRESS NOTES
Nephrology ([address] [facility]) Progress Note    Patient:  Tracee Dobbins  YOB: 1970  Date of Service: 4/5/2022  MRN: [id number]   Acct: [de-identified]   Primary Care Physician: ROMINA Flores  Advance Directive: Full Code  Admit Date: 4/1/2022       Hospital Day: 3  Referring Provider: Sara Christianson MD    Patient independently seen and examined, Chart, Consults, Notes, Operative notes, Labs, Cardiology, and Radiology studies reviewed as available. Chief complaint: Abnormal labs. Subjective:  Tracee Dobbins is a 47 y.o. female for whom we were consulted for evaluation and treatment of end-stage renal disease. Patient has end-stage renal disease due to diabetic nephropathy. She has poorly controlled type 2 diabetes as has been noncompliant. Patient is currently living in a nursing home and get dialysis at [facility] Tuesday Thursday Saturday. Presented with increasing shortness of breath dyspnea on exertion and volume overloaded. Hospital course markable for diagnosis of profound hyperglycemia. She has received emergent dialysis treatment for correction of volume status. Apparently she has not been receiving any insulin at the current nursing home. Patient now going home and she has all the medicines including insulin as well as blood pressure medications at home. Her friend is going to help her    Currently seen on hemodialysis  Hemodialysis access: AV fistula  Hemodialysis: 3-1/2-hour  Ultrafiltration: 4000 cc  2K bath  Blood flow rate is 450 cc/min.     Allergies:  Penicillins, Lamisil advanced [terbinafine], Reglan [metoclopramide], and Stadol [butorphanol]    Medicines:  Current Facility-Administered Medications   Medication Dose Route Frequency Provider Last Rate Last Admin    epoetin tish-epbx (RETACRIT) injection 10,000 Units  10,000 Units SubCUTAneous Once per day on Mon Wed Fri Jared Gotti MD   10,000 Units at 04/04/22 1128    rOPINIRole (REQUIP) tablet 4 mg  4 mg Oral Nightly Jillian Gazella, DO   4 mg at 04/04/22 2109    atorvastatin (LIPITOR) tablet 20 mg  20 mg Oral Daily Jillian Gazella, DO   20 mg at 04/04/22 5519    levothyroxine (SYNTHROID) tablet 150 mcg  150 mcg Oral Daily Jillian Gazella, DO   150 mcg at 04/05/22 3612    lisinopril (PRINIVIL;ZESTRIL) tablet 20 mg  20 mg Oral BID Jillian Gazella, DO   20 mg at 04/04/22 2109    sevelamer (RENVELA) tablet 800 mg  800 mg Oral TID WC Jillian Gazella, DO   800 mg at 04/04/22 1723    senna (SENOKOT) tablet 8.6 mg  1 tablet Oral Daily Jillian Gazella, DO   8.6 mg at 04/04/22 3802    isosorbide mononitrate (IMDUR) extended release tablet 120 mg  120 mg Oral Daily Jillian Gazella, DO   120 mg at 04/04/22 3535    hydrALAZINE (APRESOLINE) tablet 100 mg  100 mg Oral 3 times per day Jillian Gazella, DO   100 mg at 04/05/22 1854    metoprolol succinate (TOPROL XL) extended release tablet 100 mg  100 mg Oral QAM AC Jillian Gazella, DO   100 mg at 04/05/22 7805    NIFEdipine (PROCARDIA XL) extended release tablet 90 mg  90 mg Oral Daily Jillian Gazella, DO   90 mg at 04/04/22 4542    cloNIDine (CATAPRES) 0.1 MG/24HR 1 patch  1 patch TransDERmal Weekly Jillian Gazella, DO   1 patch at 04/01/22 2317    nicotine (NICODERM CQ) 14 MG/24HR 1 patch  1 patch TransDERmal Daily Jillian Gazella, DO   1 patch at 04/04/22 0854    lactulose (CHRONULAC) 10 GM/15ML solution 15.3333 g  15.3333 g Oral TID Jillian Gazella, DO   15.3333 g at 04/04/22 2108    insulin glargine (LANTUS) injection vial 5 Units  5 Units SubCUTAneous Nightly Jillian Gazella, DO   5 Units at 04/04/22 2124    insulin lispro (HUMALOG) injection vial 5 Units  5 Units SubCUTAneous TID  Jillian Berkowitz DO   5 Units at 04/04/22 1723    DULoxetine (CYMBALTA) extended release capsule 60 mg  60 mg Oral Daily Jillian Berkowitz DO   60 mg at 04/04/22 9702    sodium chloride flush 0.9 % injection 10 mL 10 mL IntraVENous 2 times per day Lorice Anjana, DO   10 mL at 04/04/22 2110    sodium chloride flush 0.9 % injection 10 mL  10 mL IntraVENous PRN Lorice Anjana, DO        0.9 % sodium chloride infusion   IntraVENous PRN Lorice Anjana, DO        heparin (porcine) injection 5,000 Units  5,000 Units SubCUTAneous 3 times per day Lorice Anjana, DO   5,000 Units at 04/05/22 9325    ondansetron (ZOFRAN-ODT) disintegrating tablet 4 mg  4 mg Oral Q8H PRN Lorice Anjana, DO        Or    ondansetron [facility]) injection 4 mg  4 mg IntraVENous Q6H PRN Lorice Anjana, DO        magnesium hydroxide (MILK OF MAGNESIA) 400 MG/5ML suspension 30 mL  30 mL Oral Daily PRN Lorice Anjana, DO        acetaminophen (TYLENOL) tablet 650 mg  650 mg Oral Q6H PRN Lorice Anjana, DO   650 mg at 04/04/22 2109    Or    acetaminophen (TYLENOL) suppository 650 mg  650 mg Rectal Q6H PRN Lorice Anjana, DO        albuterol (PROVENTIL) nebulizer solution 2.5 mg  2.5 mg Nebulization Q4H PRN Lorice Anjana, DO        [START ON 4/12/2022] vitamin D (ERGOCALCIFEROL) capsule 50,000 Units  50,000 Units Oral Q14 Days Lorice Anjana, DO           Past Medical History:  Past Medical History:   Diagnosis Date    Arthritis     Chronic kidney disease, stage 5, kidney failure ([facility] [address]     Closed fracture of right shoulder girdle, with routine healing, subsequent encounter     DM (diabetes mellitus) type II controlled with renal manifestation ([facility] [address] 06/1993    Gastroparesis     GERD (gastroesophageal reflux disease)     Hemodialysis patient ([facility] [address]     dialysis on tues, thur, and sat at [facility]    Hypertension     Hypothyroid     Nasal congestion     recent    Neuropathy     Noncompliance with medications     Palliative care patient 10/08/2019    Restless leg syndrome        Past Surgical History:  Past Surgical History:   Procedure Laterality Date    BREAST SURGERY Left 2008 infected milk gland removed    BREAST SURGERY Right 5/25/2021    WEDGE EXCISION RIGHT BREAST DUCT WITH LACRIMAL DUCT PROBE, PEC BLOCK performed by Nanci Mendez MD at [address], LAPAROSCOPIC      2008    COLONOSCOPY Left 9/17/2020    Dr Alvarado Patient hepatic flexure-Severe tortuosity with severe spasming, 1 yr recall    DIALYSIS FISTULA CREATION Left 1/25/2019    REVISION LEFT UPPER EXTREMITY AV ACCESS WITH LEFT BRACHIAL ARTERY TO LEFT AXILLARY VEIN WITH  INTERPOSITIONAL ARTEGRAFT   performed by Julius Pina MD at [address]  2012    [address] Right 1/25/2019    INSERTION OF RIGHT INTERNAL JUGULAR HEMODIALYSIS CATHETER performed by Julius Pina MD at [address] [facility]  08/17/2018    1.  Moderately increased stainable iron identified by special stain in Kupffer cells and occasional hepatocytes. Negative for evidence of significant portal or lobular inflammation. Negative for evidence of significant fibrosis    UT COLONOSCOPY W/BIOPSY SINGLE/MULTIPLE N/A 10/20/2017    Dr John Ellison prep-Tubular AP (-) dysplasia x 2--3 yr recall    UT EGD TRANSORAL BIOPSY SINGLE/MULTIPLE N/A 12/27/2016    Dr David [facility] EGD TRANSORAL BIOPSY SINGLE/MULTIPLE N/A 10/20/2017    Dr Victoria Stager (-)   [address] [facility] VASCULAR SURGERY Left 2016    fistula by Dr Volodymyr Ugalde at [address]  10/02/2017    [facility]. Left upper fistulograms/venograms, balloon angioplasty cephalic vein arch 32C21 conquest.    VASCULAR SURGERY  08/2018    FISTULOGRAM    VASCULAR SURGERY  10/29/2018    [facility]. Left upper fistulograms/venograms    VASCULAR SURGERY  12/19/2018    [facility]. Arch aortogram,left upper arteriograms.  VASCULAR SURGERY  03/06/2019    [facility]. Removal of tunneled dialyis catheter right internal jugular vein.  VASCULAR SURGERY  08/28/2019    [facility]. Left upper extremity fistulogram including venography to the superior vena cava. Balloon angioplasty left subclavian vein with 10mm x 40mm conquest balloon. Balloon angioplasty mid/proximal upper arm cephalic vein with 17LB x 40mm conquest balloon. Venograms after balloon angioplasty. Stent left mid/proximal upper arm cephalic vein stenosis fluency 10mm x 60mm self-expanding covered stent. Balloon     VASCULAR SURGERY  2019    cont angioplasty stent with 10mm x 40mm conquest balloon. Completion venograms left upper extremity.        Family History  Family History   Problem Relation Age of Onset    Colon Cancer Mother          at 63    Colon Polyps Mother     Lung Cancer Father          at 79    Colon Polyps Father     Stomach Cancer Sister     Breast Cancer Sister 27    Depression Daughter 25        committed suicide    Liver Cancer Neg Hx     Liver Disease Neg Hx     Esophageal Cancer Neg Hx     Rectal Cancer Neg Hx        Social History  Social History     Socioeconomic History    Marital status: Single     Spouse name: Not on file    Number of children: 2    Years of education: Not on file    Highest education level: Not on file   Occupational History    Not on file   Tobacco Use    Smoking status: Current Every Day Smoker     Packs/day: 0.50     Years: 33.00     Pack years: 16.50     Types: Cigarettes    Smokeless tobacco: Never Used    Tobacco comment: NOW at 1/4 PD, started at age 24 and has averaged 2 PPD   Vaping Use    Vaping Use: Never used   Substance and Sexual Activity    Alcohol use: No    Drug use: Not Currently     Types: Marijuana Rhea Kales)    Sexual activity: Not Currently     Partners: Male   Other Topics Concern    Not on file   Social History Narrative    Not on file     Social Determinants of Health     Financial Resource Strain:     Difficulty of Paying Living Expenses: Not on file   Food Insecurity:     Worried About Running Out of Food in the Last Year: Not on file    Kendy of Food in the Last Year: Not on file   Transportation Needs:     Lack of Transportation (Medical): Not on file    Lack of Transportation (Non-Medical):  Not on file   Physical Activity:     Days of Exercise per Week: Not on file    Minutes of Exercise per Session: Not on file   Stress:     Feeling of Stress : Not on file   Social Connections:     Frequency of Communication with Friends and Family: Not on file    Frequency of Social Gatherings with Friends and Family: Not on file    Attends Gnosticist Services: Not on file    Active Member of Clubs or Organizations: Not on file    Attends Club or Organization Meetings: Not on file    Marital Status: Not on file   Intimate Partner Violence:     Fear of Current or Ex-Partner: Not on file    Emotionally Abused: Not on file    Physically Abused: Not on file    Sexually Abused: Not on file   Housing Stability:     Unable to Pay for Housing in the Last Year: Not on file    Number of Places Lived in the Last Year: Not on file    Unstable Housing in the Last Year: Not on file         Review of Systems:  History obtained from chart review and the patient  General ROS: No fever or chills  Respiratory ROS: No cough, shortness of breath, wheezing  Cardiovascular ROS: No chest pain or palpitations  Gastrointestinal ROS: No abdominal pain or melena  Genito-Urinary ROS: No dysuria or hematuria  Musculoskeletal ROS: No joint pain or swelling         Objective:  Patient Vitals for the past 24 hrs:   BP Temp Temp src Pulse Resp SpO2   04/05/22 0731 -- -- -- -- 16 95 %   04/05/22 0625 (!) 174/71 97.7 °F (36.5 °C) Temporal 64 16 93 %   04/05/22 0031 135/60 97.5 °F (36.4 °C) Temporal 64 16 95 %   04/04/22 1615 (!) 172/74 97.7 °F (36.5 °C) Temporal 70 16 97 %   04/04/22 1112 (!) 175/70 97.9 °F (36.6 °C) Temporal 71 18 96 %       Intake/Output Summary (Last 24 hours) at 4/5/2022 0421  Last data filed at 4/4/2022 1112  Gross per 24 hour   Intake 345 ml   Output --   Net 345 ml     General: awake/alert    HEENT: Normocephalic atraumatic head  Neck: Supple with no JVD. Chest:  clear to auscultation bilaterally  CVS: regular rate and rhythm  Abdominal: soft, nontender, normal bowel sounds  Extremities: 2+ pedal edema. Skin: warm and dry without rash    Labs:  BMP:   Recent Labs     04/05/22 0322   *   K 5.6*   CL 94*   CO2 22   BUN 37*   CREATININE 5.1*   CALCIUM 8.7     CBC:   Recent Labs     04/05/22 0322   WBC 4.0*   HGB 8.8*   HCT 29.1*   MCV 93.9        LIVER PROFILE:   Recent Labs     04/05/22 0322   AST 11   ALT <5*   BILITOT 0.3   ALKPHOS 98     PT/INR: No results for input(s): PROTIME, INR in the last 72 hours. APTT: No results for input(s): APTT in the last 72 hours. BNP:  No results for input(s): BNP in the last 72 hours. Ionized Calcium:No results for input(s): IONCA in the last 72 hours. Magnesium:  Recent Labs     04/05/22 0322   MG 2.5     Phosphorus:No results for input(s): PHOS in the last 72 hours. HgbA1C: No results for input(s): LABA1C in the last 72 hours. Hepatic:   Recent Labs     04/05/22 0322   ALKPHOS 98   ALT <5*   AST 11   PROT 5.9*   BILITOT 0.3   LABALBU 3.4*     Lactic Acid: No results for input(s): LACTA in the last 72 hours. Troponin: No results for input(s): CKTOTAL, CKMB, TROPONINT in the last 72 hours. ABGs:   Lab Results   Component Value Date    PHART 7.340 01/01/2022    PO2ART 57.0 01/01/2022    ILY1WWR 44.0 01/01/2022     CRP:  No results for input(s): CRP in the last 72 hours. Sed Rate:  No results for input(s): SEDRATE in the last 72 hours. Culture Results:   Blood Culture Recent: No results for input(s): BC in the last 720 hours. Urine Culture Recent : No results for input(s): LABURIN in the last 720 hours.     Radiology reports as per the Radiologist  Radiology: XR CHEST PORTABLE    Result Date: 4/1/2022  XR CHEST PORTABLE 4/1/2022 12:35 PM HISTORY: Shortness of breath  Technique: Single AP view of the chest COMPARISONS: Chest exam dated 1/22/2022 FINDINGS: Cardiomegaly with central pulmonary vascular congestion, interstitial and alveolar edema as well as bilateral moderate layering pleural effusions. No pneumothorax. No acute bony abnormality. 1. Volume overload with interstitial and carie pulmonary edema as well as bilateral moderate pleural effusions. Signed by Dr Gabino Welsh   1. End-stage renal disease/currently seen on hemodialysis. 2.  Type II diabetic nephropathy. 3.  Volume overload resolved. 4.  Anemia of chronic kidney disease. 5.  Poorly controlled type 2 diabetes. 6.  Poorly controlled hypertension. 7.  Noncompliant dialysis patient      Plan:  1. Ultrafiltration up to 4000 cc. 2.  Continue LANDON. 3.  Possible discharge to home today.     Anne Bruce MD  04/05/22  9:37 AM

## 2022-04-05 NOTE — PROGRESS NOTES
Again, attempted to call lab and phlebotomist no answer. Message left with phlebotomist phone.   Awaiting lab draw

## 2022-04-24 ENCOUNTER — APPOINTMENT (OUTPATIENT)
Dept: GENERAL RADIOLOGY | Age: 52
DRG: 004 | End: 2022-04-24
Payer: MEDICARE

## 2022-04-24 ENCOUNTER — APPOINTMENT (OUTPATIENT)
Dept: CT IMAGING | Age: 52
DRG: 004 | End: 2022-04-24
Payer: MEDICARE

## 2022-04-24 ENCOUNTER — HOSPITAL ENCOUNTER (INPATIENT)
Age: 52
LOS: 31 days | Discharge: NURSING, REHAB OR LONG-TERM CARE | DRG: 004 | End: 2022-05-25
Attending: EMERGENCY MEDICINE | Admitting: INTERNAL MEDICINE
Payer: MEDICARE

## 2022-04-24 DIAGNOSIS — E11.01: ICD-10-CM

## 2022-04-24 DIAGNOSIS — Z99.2 ESRD (END STAGE RENAL DISEASE) ON DIALYSIS (HCC): ICD-10-CM

## 2022-04-24 DIAGNOSIS — R56.9 SEIZURE (HCC): Primary | ICD-10-CM

## 2022-04-24 DIAGNOSIS — I63.9 ACUTE ISCHEMIC STROKE (HCC): ICD-10-CM

## 2022-04-24 DIAGNOSIS — N18.6 ESRD (END STAGE RENAL DISEASE) ON DIALYSIS (HCC): ICD-10-CM

## 2022-04-24 DIAGNOSIS — R82.81 PYURIA: ICD-10-CM

## 2022-04-24 LAB
ADENOVIRUS F 40 41 PCR: NOT DETECTED
ALBUMIN SERPL-MCNC: 4 G/DL (ref 3.5–5.2)
ALLENS TEST: ABNORMAL
ALP BLD-CCNC: 134 U/L (ref 35–104)
ALT SERPL-CCNC: 15 U/L (ref 5–33)
AMPHETAMINE SCREEN, URINE: NEGATIVE
ANION GAP SERPL CALCULATED.3IONS-SCNC: 20 MMOL/L (ref 7–19)
ANION GAP SERPL CALCULATED.3IONS-SCNC: 23 MMOL/L (ref 7–19)
AST SERPL-CCNC: 19 U/L (ref 5–32)
ASTROVIRUS PCR: NOT DETECTED
BACTERIA: ABNORMAL /HPF
BARBITURATE SCREEN URINE: NEGATIVE
BASE EXCESS ARTERIAL: -4.1 MMOL/L (ref -2–2)
BASOPHILS ABSOLUTE: 0.1 K/UL (ref 0–0.2)
BASOPHILS RELATIVE PERCENT: 1.1 % (ref 0–1)
BENZODIAZEPINE SCREEN, URINE: NEGATIVE
BILIRUB SERPL-MCNC: 0.7 MG/DL (ref 0.2–1.2)
BILIRUBIN URINE: NEGATIVE
BLOOD, URINE: ABNORMAL
BUN BLDV-MCNC: 60 MG/DL (ref 6–20)
BUN BLDV-MCNC: 65 MG/DL (ref 6–20)
CALCIUM SERPL-MCNC: 8.7 MG/DL (ref 8.6–10)
CALCIUM SERPL-MCNC: 9 MG/DL (ref 8.6–10)
CAMPYLOBACTER PCR: NOT DETECTED
CANNABINOID SCREEN URINE: NEGATIVE
CARBOXYHEMOGLOBIN ARTERIAL: 1.6 % (ref 0–5)
CHLORIDE BLD-SCNC: 80 MMOL/L (ref 98–111)
CHLORIDE BLD-SCNC: 84 MMOL/L (ref 98–111)
CLARITY: CLEAR
CLOSTRIDIUM DIFFICILE, PCR: DETECTED
CO2: 21 MMOL/L (ref 22–29)
CO2: 21 MMOL/L (ref 22–29)
COCAINE METABOLITE SCREEN URINE: NEGATIVE
COLOR: YELLOW
CREAT SERPL-MCNC: 5.2 MG/DL (ref 0.5–0.9)
CREAT SERPL-MCNC: 5.3 MG/DL (ref 0.5–0.9)
CRYPTOSPORIDIUM PCR: NOT DETECTED
CYCLOSPORA CAYETANENSIS PCR: NOT DETECTED
E COLI ENTEROAGGREGATIVE PCR: NOT DETECTED
E COLI ENTEROPATHOGENIC PCR: NOT DETECTED
E COLI ENTEROTOXIGENIC PCR: NOT DETECTED
E COLI SHIGELLA/ENTEROINVASIVE PCR: NOT DETECTED
ENTAMOEBA HISTOLYTICA PCR: NOT DETECTED
EOSINOPHILS ABSOLUTE: 0.1 K/UL (ref 0–0.6)
EOSINOPHILS RELATIVE PERCENT: 1.1 % (ref 0–5)
ETHANOL: <10 MG/DL (ref 0–0.08)
FIO2: 100 %
GFR AFRICAN AMERICAN: 10
GFR AFRICAN AMERICAN: 10
GFR NON-AFRICAN AMERICAN: 8
GFR NON-AFRICAN AMERICAN: 9
GIARDIA LAMBLIA PCR: NOT DETECTED
GLUCOSE BLD-MCNC: 660 MG/DL (ref 74–109)
GLUCOSE BLD-MCNC: 700 MG/DL (ref 74–109)
GLUCOSE BLD-MCNC: 728 MG/DL (ref 74–109)
GLUCOSE BLD-MCNC: 811 MG/DL (ref 74–109)
GLUCOSE URINE: =>1000 MG/DL
HBA1C MFR BLD: 9.7 % (ref 4–6)
HCO3 ARTERIAL: 21.6 MMOL/L (ref 22–26)
HCT VFR BLD CALC: 36.4 % (ref 37–47)
HEMOGLOBIN, ART, EXTENDED: 10.8 G/DL (ref 12–16)
HEMOGLOBIN: 11.3 G/DL (ref 12–16)
IMMATURE GRANULOCYTES #: 0 K/UL
KETONES, URINE: NEGATIVE MG/DL
LEUKOCYTE ESTERASE, URINE: ABNORMAL
LYMPHOCYTES ABSOLUTE: 1.4 K/UL (ref 1.1–4.5)
LYMPHOCYTES RELATIVE PERCENT: 16.5 % (ref 20–40)
Lab: NORMAL
MAGNESIUM: 2.2 MG/DL (ref 1.6–2.6)
MCH RBC QN AUTO: 28.8 PG (ref 27–31)
MCHC RBC AUTO-ENTMCNC: 31 G/DL (ref 33–37)
MCV RBC AUTO: 92.6 FL (ref 81–99)
METHEMOGLOBIN ARTERIAL: 0.8 %
MONOCYTES ABSOLUTE: 0.6 K/UL (ref 0–0.9)
MONOCYTES RELATIVE PERCENT: 6.5 % (ref 0–10)
NEUTROPHILS ABSOLUTE: 6.3 K/UL (ref 1.5–7.5)
NEUTROPHILS RELATIVE PERCENT: 74.6 % (ref 50–65)
NITRITE, URINE: NEGATIVE
NOROVIRUS GI GII PCR: DETECTED
O2 CONTENT ARTERIAL: 15.8 ML/DL
O2 SAT, ARTERIAL: 97.1 %
O2 THERAPY: ABNORMAL
OPIATE SCREEN URINE: NEGATIVE
PCO2 ARTERIAL: 41 MMHG (ref 35–45)
PDW BLD-RTO: 18.7 % (ref 11.5–14.5)
PH ARTERIAL: 7.33 (ref 7.35–7.45)
PH UA: 7 (ref 5–8)
PHOSPHORUS: 3.7 MG/DL (ref 2.5–4.5)
PLATELET # BLD: 151 K/UL (ref 130–400)
PLESIOMONAS SHIGELLOIDES PCR: NOT DETECTED
PMV BLD AUTO: 9.5 FL (ref 9.4–12.3)
PO2 ARTERIAL: 389 MMHG (ref 80–100)
POSITIVE END EXP PRESS: 5
POTASSIUM REFLEX MAGNESIUM: 3.6 MMOL/L (ref 3.5–5)
POTASSIUM SERPL-SCNC: 3 MMOL/L (ref 3.5–5)
POTASSIUM, WHOLE BLOOD: 4.2
PRO-BNP: ABNORMAL PG/ML (ref 0–900)
PROTEIN UA: 300 MG/DL
RBC # BLD: 3.93 M/UL (ref 4.2–5.4)
RBC UA: ABNORMAL /HPF (ref 0–2)
ROTAVIRUS A PCR: NOT DETECTED
SALMONELLA PCR: NOT DETECTED
SAMPLE SOURCE: ABNORMAL
SAPOVIRUS PCR: NOT DETECTED
SHIGA-LIKE TOXIN-PRODUCING E. COLI (STEC) STX1/STX2: NOT DETECTED
SODIUM BLD-SCNC: 124 MMOL/L (ref 136–145)
SODIUM BLD-SCNC: 125 MMOL/L (ref 136–145)
SPECIFIC GRAVITY UA: 1.02 (ref 1–1.03)
TOTAL PROTEIN: 6.9 G/DL (ref 6.6–8.7)
TROPONIN: 0.18 NG/ML (ref 0–0.03)
TSH SERPL DL<=0.05 MIU/L-ACNC: 14.19 UIU/ML (ref 0.27–4.2)
UROBILINOGEN, URINE: 0.2 E.U./DL
VIBRIO CHOLERAE PCR: NOT DETECTED
VIBRIO PCR: NOT DETECTED
VT MECHANICAL: 450 %
WBC # BLD: 8.5 K/UL (ref 4.8–10.8)
WBC UA: ABNORMAL /HPF (ref 0–5)
YERSINIA ENTEROCOLITICA PCR: NOT DETECTED

## 2022-04-24 PROCEDURE — 2580000003 HC RX 258: Performed by: INTERNAL MEDICINE

## 2022-04-24 PROCEDURE — 0DH63UZ INSERTION OF FEEDING DEVICE INTO STOMACH, PERCUTANEOUS APPROACH: ICD-10-PCS | Performed by: EMERGENCY MEDICINE

## 2022-04-24 PROCEDURE — 0BH17EZ INSERTION OF ENDOTRACHEAL AIRWAY INTO TRACHEA, VIA NATURAL OR ARTIFICIAL OPENING: ICD-10-PCS | Performed by: EMERGENCY MEDICINE

## 2022-04-24 PROCEDURE — 83036 HEMOGLOBIN GLYCOSYLATED A1C: CPT

## 2022-04-24 PROCEDURE — 87507 IADNA-DNA/RNA PROBE TQ 12-25: CPT

## 2022-04-24 PROCEDURE — 87040 BLOOD CULTURE FOR BACTERIA: CPT

## 2022-04-24 PROCEDURE — 2580000003 HC RX 258: Performed by: EMERGENCY MEDICINE

## 2022-04-24 PROCEDURE — 6360000002 HC RX W HCPCS: Performed by: EMERGENCY MEDICINE

## 2022-04-24 PROCEDURE — 84484 ASSAY OF TROPONIN QUANT: CPT

## 2022-04-24 PROCEDURE — 81001 URINALYSIS AUTO W/SCOPE: CPT

## 2022-04-24 PROCEDURE — 71045 X-RAY EXAM CHEST 1 VIEW: CPT

## 2022-04-24 PROCEDURE — 84443 ASSAY THYROID STIM HORMONE: CPT

## 2022-04-24 PROCEDURE — 84100 ASSAY OF PHOSPHORUS: CPT

## 2022-04-24 PROCEDURE — 96365 THER/PROPH/DIAG IV INF INIT: CPT

## 2022-04-24 PROCEDURE — 96375 TX/PRO/DX INJ NEW DRUG ADDON: CPT

## 2022-04-24 PROCEDURE — 82947 ASSAY GLUCOSE BLOOD QUANT: CPT

## 2022-04-24 PROCEDURE — 2500000003 HC RX 250 WO HCPCS: Performed by: INTERNAL MEDICINE

## 2022-04-24 PROCEDURE — 6360000002 HC RX W HCPCS: Performed by: INTERNAL MEDICINE

## 2022-04-24 PROCEDURE — 2000000000 HC ICU R&B

## 2022-04-24 PROCEDURE — 95718 EEG PHYS/QHP 2-12 HR W/VEEG: CPT | Performed by: PSYCHIATRY & NEUROLOGY

## 2022-04-24 PROCEDURE — 6370000000 HC RX 637 (ALT 250 FOR IP): Performed by: INTERNAL MEDICINE

## 2022-04-24 PROCEDURE — 93005 ELECTROCARDIOGRAM TRACING: CPT | Performed by: EMERGENCY MEDICINE

## 2022-04-24 PROCEDURE — 6370000000 HC RX 637 (ALT 250 FOR IP): Performed by: EMERGENCY MEDICINE

## 2022-04-24 PROCEDURE — 99223 1ST HOSP IP/OBS HIGH 75: CPT | Performed by: PSYCHIATRY & NEUROLOGY

## 2022-04-24 PROCEDURE — 2700000000 HC OXYGEN THERAPY PER DAY

## 2022-04-24 PROCEDURE — 95700 EEG CONT REC W/VID EEG TECH: CPT

## 2022-04-24 PROCEDURE — 36415 COLL VENOUS BLD VENIPUNCTURE: CPT

## 2022-04-24 PROCEDURE — 70450 CT HEAD/BRAIN W/O DYE: CPT

## 2022-04-24 PROCEDURE — 85025 COMPLETE CBC W/AUTO DIFF WBC: CPT

## 2022-04-24 PROCEDURE — 80307 DRUG TEST PRSMV CHEM ANLYZR: CPT

## 2022-04-24 PROCEDURE — 82077 ASSAY SPEC XCP UR&BREATH IA: CPT

## 2022-04-24 PROCEDURE — 87086 URINE CULTURE/COLONY COUNT: CPT

## 2022-04-24 PROCEDURE — 80053 COMPREHEN METABOLIC PANEL: CPT

## 2022-04-24 PROCEDURE — 31500 INSERT EMERGENCY AIRWAY: CPT

## 2022-04-24 PROCEDURE — 82803 BLOOD GASES ANY COMBINATION: CPT

## 2022-04-24 PROCEDURE — 83735 ASSAY OF MAGNESIUM: CPT

## 2022-04-24 PROCEDURE — 94002 VENT MGMT INPAT INIT DAY: CPT

## 2022-04-24 PROCEDURE — 99285 EMERGENCY DEPT VISIT HI MDM: CPT

## 2022-04-24 PROCEDURE — 36600 WITHDRAWAL OF ARTERIAL BLOOD: CPT

## 2022-04-24 PROCEDURE — 83880 ASSAY OF NATRIURETIC PEPTIDE: CPT

## 2022-04-24 RX ORDER — PROPOFOL 10 MG/ML
5-50 INJECTION, EMULSION INTRAVENOUS ONCE
Status: COMPLETED | OUTPATIENT
Start: 2022-04-24 | End: 2022-04-24

## 2022-04-24 RX ORDER — PROPOFOL 10 MG/ML
5-50 INJECTION, EMULSION INTRAVENOUS CONTINUOUS
Status: DISCONTINUED | OUTPATIENT
Start: 2022-04-24 | End: 2022-05-01

## 2022-04-24 RX ORDER — LEVETIRACETAM 5 MG/ML
500 INJECTION INTRAVASCULAR ONCE
Status: COMPLETED | OUTPATIENT
Start: 2022-04-24 | End: 2022-04-24

## 2022-04-24 RX ORDER — SODIUM CHLORIDE 0.9 % (FLUSH) 0.9 %
5-40 SYRINGE (ML) INJECTION PRN
Status: DISCONTINUED | OUTPATIENT
Start: 2022-04-24 | End: 2022-05-13 | Stop reason: SDUPTHER

## 2022-04-24 RX ORDER — CHLORHEXIDINE GLUCONATE 0.12 MG/ML
15 RINSE ORAL 2 TIMES DAILY
Status: DISCONTINUED | OUTPATIENT
Start: 2022-04-24 | End: 2022-05-25 | Stop reason: HOSPADM

## 2022-04-24 RX ORDER — LORAZEPAM 2 MG/ML
2 INJECTION INTRAMUSCULAR ONCE
Status: COMPLETED | OUTPATIENT
Start: 2022-04-24 | End: 2022-04-24

## 2022-04-24 RX ORDER — LEVETIRACETAM 5 MG/ML
500 INJECTION INTRAVASCULAR EVERY 12 HOURS
Status: DISCONTINUED | OUTPATIENT
Start: 2022-04-25 | End: 2022-05-13

## 2022-04-24 RX ORDER — POTASSIUM CHLORIDE 7.45 MG/ML
10 INJECTION INTRAVENOUS PRN
Status: DISCONTINUED | OUTPATIENT
Start: 2022-04-24 | End: 2022-05-25 | Stop reason: HOSPADM

## 2022-04-24 RX ORDER — PROPOFOL 10 MG/ML
5-50 INJECTION, EMULSION INTRAVENOUS ONCE
Status: DISCONTINUED | OUTPATIENT
Start: 2022-04-24 | End: 2022-05-01

## 2022-04-24 RX ORDER — MAGNESIUM SULFATE 1 G/100ML
1000 INJECTION INTRAVENOUS PRN
Status: DISCONTINUED | OUTPATIENT
Start: 2022-04-24 | End: 2022-05-25 | Stop reason: HOSPADM

## 2022-04-24 RX ORDER — SODIUM CHLORIDE 9 MG/ML
INJECTION, SOLUTION INTRAVENOUS PRN
Status: DISCONTINUED | OUTPATIENT
Start: 2022-04-24 | End: 2022-05-13 | Stop reason: SDUPTHER

## 2022-04-24 RX ORDER — POLYETHYLENE GLYCOL 3350 17 G/17G
17 POWDER, FOR SOLUTION ORAL DAILY PRN
Status: DISCONTINUED | OUTPATIENT
Start: 2022-04-24 | End: 2022-05-25 | Stop reason: HOSPADM

## 2022-04-24 RX ORDER — ONDANSETRON 2 MG/ML
4 INJECTION INTRAMUSCULAR; INTRAVENOUS EVERY 6 HOURS PRN
Status: DISCONTINUED | OUTPATIENT
Start: 2022-04-24 | End: 2022-05-25 | Stop reason: HOSPADM

## 2022-04-24 RX ORDER — SODIUM CHLORIDE 0.9 % (FLUSH) 0.9 %
5-40 SYRINGE (ML) INJECTION EVERY 12 HOURS SCHEDULED
Status: DISCONTINUED | OUTPATIENT
Start: 2022-04-24 | End: 2022-05-13

## 2022-04-24 RX ORDER — ONDANSETRON 4 MG/1
4 TABLET, ORALLY DISINTEGRATING ORAL EVERY 8 HOURS PRN
Status: DISCONTINUED | OUTPATIENT
Start: 2022-04-24 | End: 2022-05-25 | Stop reason: HOSPADM

## 2022-04-24 RX ORDER — DEXTROSE MONOHYDRATE 50 MG/ML
100 INJECTION, SOLUTION INTRAVENOUS PRN
Status: DISCONTINUED | OUTPATIENT
Start: 2022-04-24 | End: 2022-05-25 | Stop reason: HOSPADM

## 2022-04-24 RX ORDER — LORAZEPAM 2 MG/ML
1 INJECTION INTRAMUSCULAR EVERY 5 MIN PRN
Status: DISCONTINUED | OUTPATIENT
Start: 2022-04-24 | End: 2022-05-25 | Stop reason: HOSPADM

## 2022-04-24 RX ORDER — NICOTINE POLACRILEX 4 MG
15 LOZENGE BUCCAL PRN
Status: DISCONTINUED | OUTPATIENT
Start: 2022-04-24 | End: 2022-04-24 | Stop reason: CLARIF

## 2022-04-24 RX ORDER — ACETAMINOPHEN 650 MG/1
650 SUPPOSITORY RECTAL EVERY 6 HOURS PRN
Status: DISCONTINUED | OUTPATIENT
Start: 2022-04-24 | End: 2022-05-25 | Stop reason: HOSPADM

## 2022-04-24 RX ORDER — ACETAMINOPHEN 325 MG/1
650 TABLET ORAL EVERY 6 HOURS PRN
Status: DISCONTINUED | OUTPATIENT
Start: 2022-04-24 | End: 2022-05-25 | Stop reason: HOSPADM

## 2022-04-24 RX ORDER — HEPARIN SODIUM 5000 [USP'U]/ML
5000 INJECTION, SOLUTION INTRAVENOUS; SUBCUTANEOUS 3 TIMES DAILY
Status: DISCONTINUED | OUTPATIENT
Start: 2022-04-24 | End: 2022-05-01

## 2022-04-24 RX ORDER — PROPOFOL 10 MG/ML
INJECTION, EMULSION INTRAVENOUS
Status: DISPENSED
Start: 2022-04-24 | End: 2022-04-25

## 2022-04-24 RX ORDER — DEXTROSE MONOHYDRATE 25 G/50ML
12.5 INJECTION, SOLUTION INTRAVENOUS PRN
Status: DISCONTINUED | OUTPATIENT
Start: 2022-04-24 | End: 2022-04-24 | Stop reason: CLARIF

## 2022-04-24 RX ADMIN — LORAZEPAM 2 MG: 2 INJECTION INTRAMUSCULAR; INTRAVENOUS at 12:51

## 2022-04-24 RX ADMIN — ACETAMINOPHEN 650 MG: 325 TABLET ORAL at 19:50

## 2022-04-24 RX ADMIN — POTASSIUM CHLORIDE 10 MEQ: 7.45 INJECTION INTRAVENOUS at 22:46

## 2022-04-24 RX ADMIN — PROPOFOL 50 MCG/KG/MIN: 10 INJECTION, EMULSION INTRAVENOUS at 22:32

## 2022-04-24 RX ADMIN — VANCOMYCIN HYDROCHLORIDE 1750 MG: 5 INJECTION, POWDER, LYOPHILIZED, FOR SOLUTION INTRAVENOUS at 16:04

## 2022-04-24 RX ADMIN — Medication 10 ML: at 20:00

## 2022-04-24 RX ADMIN — DEXTROSE MONOHYDRATE 5 MG/HR: 50 INJECTION, SOLUTION INTRAVENOUS at 18:30

## 2022-04-24 RX ADMIN — DEXTROSE MONOHYDRATE 5 MG/HR: 50 INJECTION, SOLUTION INTRAVENOUS at 21:22

## 2022-04-24 RX ADMIN — HEPARIN SODIUM 5000 UNITS: 5000 INJECTION INTRAVENOUS; SUBCUTANEOUS at 16:53

## 2022-04-24 RX ADMIN — PROPOFOL 50 MCG/KG/MIN: 10 INJECTION, EMULSION INTRAVENOUS at 16:49

## 2022-04-24 RX ADMIN — WATER 1000 MG: 1 INJECTION INTRAMUSCULAR; INTRAVENOUS; SUBCUTANEOUS at 14:51

## 2022-04-24 RX ADMIN — LEVETIRACETAM 500 MG: 5 INJECTION INTRAVENOUS at 13:18

## 2022-04-24 RX ADMIN — HEPARIN SODIUM 5000 UNITS: 5000 INJECTION INTRAVENOUS; SUBCUTANEOUS at 20:40

## 2022-04-24 RX ADMIN — SODIUM CHLORIDE 0.5 UNITS/HR: 9 INJECTION, SOLUTION INTRAVENOUS at 15:17

## 2022-04-24 RX ADMIN — SODIUM CHLORIDE 30 UNITS/HR: 9 INJECTION, SOLUTION INTRAVENOUS at 22:31

## 2022-04-24 RX ADMIN — SODIUM CHLORIDE, PRESERVATIVE FREE 20 MG: 5 INJECTION INTRAVENOUS at 17:23

## 2022-04-24 RX ADMIN — PROPOFOL 5 MCG/KG/MIN: 10 INJECTION, EMULSION INTRAVENOUS at 13:23

## 2022-04-24 ASSESSMENT — PULMONARY FUNCTION TESTS
PIF_VALUE: 20
PIF_VALUE: 21
PIF_VALUE: 20
PIF_VALUE: 20
PIF_VALUE: 21
PIF_VALUE: 22

## 2022-04-24 ASSESSMENT — PAIN SCALES - GENERAL
PAINLEVEL_OUTOF10: 0
PAINLEVEL_OUTOF10: 0

## 2022-04-24 ASSESSMENT — PAIN DESCRIPTION - LOCATION: LOCATION: OTHER (COMMENT)

## 2022-04-24 ASSESSMENT — PAIN DESCRIPTION - DESCRIPTORS: DESCRIPTORS: OTHER (COMMENT)

## 2022-04-24 NOTE — CONSULTS
Renal Consult Note    Thank you to requesting provider: Dr Papa Tirado, for asking us to see Sherrierachana Adames    Reason for consultation: End-stage renal disease    Chief Complaint: Altered mental status    History of Presenting Illness      Patient is a 77-year-old woman with a past medical history of seizures, type 2 diabetes and end-stage renal disease. She has been on chronic maintenance hemodialysis on Tuesday Thursday and Saturday. She had a previous history of noncompliance to dialysis. Patient was brought to ED after an episode of unresponsiveness and witnessed generalized seizure episode. She was found to be severely hyperglycemic in the ER along with a urine tract infection. Currently intensive care unit, intubated and on the ventilator. She received Keppra and was also placed on propofol for sedation. Patient was started on intravenous antibiotics for urinary tract infection. Renal service was consulted to manage her ESRD. No further history could be obtained except from medical chart and nursing staff.     Past Medical/Surgical History      Active Ambulatory Problems     Diagnosis Date Noted    Gastroesophageal reflux disease without esophagitis     Acquired hypothyroidism     Anemia in chronic kidney disease (CKD) 05/23/2017    Iron deficiency 05/23/2017    Family history of colon cancer 10/20/2017    Unintentional weight loss 10/20/2017    Type 2 diabetes mellitus with chronic kidney disease on chronic dialysis, with long-term current use of insulin ([address] 12/09/2017    Acute encephalopathy     Noncompliance of patient with renal dialysis ([address]     Respiratory failure ([address] 03/05/2018    Acute respiratory failure with hypoxia and hypercapnia ([address] 03/07/2018    Dialysis AV fistula malfunction (HCC) 06/27/2018    Gastroparesis 08/08/2018    Nausea and vomiting 08/08/2018    Chronic constipation 08/08/2018    Uncontrolled type 2 diabetes mellitus with complication, with long-term current use of insulin ([address] 08/08/2018    Abnormal CT of liver 08/15/2018    Other ascites 08/15/2018    ESRD (end stage renal disease) on dialysis ([address]     High hepatic iron concentration determined by biopsy of liver 12/05/2018    Steal syndrome as complication of dialysis access ([address] 12/19/2018    PVD (peripheral vascular disease) ([address]     ESRD on hemodialysis ([address] 01/25/2019    Hyponatremia 01/26/2019    Hyperglycemia due to type 2 diabetes mellitus ([address] 01/26/2019    Normocytic anemia 01/26/2019    Volume overload 03/27/2019    Left homonymous hemianopsia     Acute intractable headache     Uncontrolled hypertension 04/20/2019    Medically noncompliant 04/20/2019    Noncompliance with medications     Seizure ([address] 10/08/2019    Hyperglycemic hyperosmolar nonketotic coma ([address]     Type 2 diabetes mellitus with hyperosmolar coma, with long-term current use of insulin ([address]     Palliative care patient 10/08/2019    Hypertensive emergency 06/24/2020    Chronic epigastric pain 08/18/2020    Chronic nausea 08/18/2020    Chronic vomiting 08/18/2020    Abnormal CT of the abdomen 08/18/2020    Poor appetite 08/18/2020    Personal history of colonic polyps 08/18/2020    Hyperosmolar syndrome 09/16/2020    DKA, type 2, not at goal [facility]) 10/21/2020    Hypertensive urgency 10/21/2020    Altered mental state 10/21/2020    Sacroiliitis ([address] 01/13/2021    Lumbar radiculopathy 01/13/2021    Left leg pain 02/24/2021    Left leg swelling 02/24/2021    Unable to ambulate 02/24/2021    [facility]charge from [facility] 04/14/2021    Severe hyperglycemia due to diabetes mellitus ([address] 05/15/2021    Acute hypoxemic respiratory failure ([address] 05/29/2021    Generalized weakness 11/20/2021    Hyperosmolar hyperglycemic state (HHS) ([address] 12/14/2021    Hyperglycemia 01/01/2022    Hypoglycemia associated with diabetes ([address] 01/10/2022    Closed fracture of left distal femur ([facility] 01/22/2022    Closed IntraMUSCular, PRN, Erica Poplar, DO    dextrose 5 % solution, 100 mL/hr, IntraVENous, PRN, Erica Poplar, DO    vancomycin (VANCOCIN) 1,750 mg in dextrose 5 % 500 mL IVPB, 25 mg/kg, IntraVENous, Once, Erica Poplar, DO, Last Rate: 250 mL/hr at 04/24/22 1604, 1,750 mg at 04/24/22 1604    glucose chewable tablet 16 g, 4 tablet, Oral, PRN, Erica Poplar, DO    dextrose bolus (hypoglycemia) 10% 125 mL, 125 mL, IntraVENous, PRN **OR** dextrose bolus (hypoglycemia) 10% 250 mL, 250 mL, IntraVENous, PRN, Erica Poplar, DO    sodium chloride flush 0.9 % injection 5-40 mL, 5-40 mL, IntraVENous, 2 times per day, Erica Poplar, DO    sodium chloride flush 0.9 % injection 5-40 mL, 5-40 mL, IntraVENous, PRN, Erica Iveyar, DO    0.9 % sodium chloride infusion, , IntraVENous, PRN, Erica Iveyar, DO    LORazepam (ATIVAN) injection 1 mg, 1 mg, IntraVENous, Q5 Min PRN, Erica Poplar, DO    heparin (porcine) injection 5,000 Units, 5,000 Units, SubCUTAneous, TID, Erica Iveyar, DO, 5,000 Units at 04/24/22 1653    ondansetron (ZOFRAN-ODT) disintegrating tablet 4 mg, 4 mg, Oral, Q8H PRN **OR** ondansetron (ZOFRAN) injection 4 mg, 4 mg, IntraVENous, Q6H PRN, Erica Iveyar, DO    polyethylene glycol (GLYCOLAX) packet 17 g, 17 g, Oral, Daily PRN, Erica Iveyar, DO    acetaminophen (TYLENOL) tablet 650 mg, 650 mg, Oral, Q6H PRN **OR** acetaminophen (TYLENOL) suppository 650 mg, 650 mg, Rectal, Q6H PRN, Erica Iveyar, DO    [START ON 4/25/2022] levETIRAcetam (KEPPRA) 500 mg/100 mL IVPB, 500 mg, IntraVENous, Q12H, Erica Iveyar, DO    chlorhexidine (PERIDEX) 0.12 % solution 15 mL, 15 mL, Mouth/Throat, BID, Erica Guevara DO    famotidine (PEPCID) 20 mg in sodium chloride (PF) 10 mL injection, 20 mg, IntraVENous, Daily, Erica Guevara DO, 20 mg at 04/24/22 1723    propofol injection, 5-50 mcg/kg/min, IntraVENous, Continuous, Florentino Solomon Emily Ran, DO, Last Rate: 22.4 mL/hr at 22 1649, 50 mcg/kg/min at 22 1649    magic butt cream, , Topical, Q4H PRN, Jani Marking, DO  No outpatient medications have been marked as taking for the 22 encounter [facility] Encounter). Allergies   Penicillins, Lamisil advanced [terbinafine], Reglan [metoclopramide], and Stadol [butorphanol]    Family History       Family History   Problem Relation Age of Onset    Colon Cancer Mother          at 63    Colon Polyps Mother     Lung Cancer Father          at 79    Colon Polyps Father     Stomach Cancer Sister     Breast Cancer Sister 27    Depression Daughter 25        committed suicide    Liver Cancer Neg Hx     Liver Disease Neg Hx     Esophageal Cancer Neg Hx     Rectal Cancer Neg Hx      Family history negative for kidney disease. Social History      Social History     Socioeconomic History    Marital status: Single     Spouse name: None    Number of children: 2    Years of education: None    Highest education level: None   Occupational History    None   Tobacco Use    Smoking status: Current Every Day Smoker     Packs/day: 0.50     Years: 33.00     Pack years: 16.50     Types: Cigarettes    Smokeless tobacco: Never Used    Tobacco comment: NOW at 1/4 PD, started at age 24 and has averaged 2 PPD   Vaping Use    Vaping Use: Never used   Substance and Sexual Activity    Alcohol use: No    Drug use: Not Currently     Types: Marijuana Jena November)    Sexual activity: Not Currently     Partners: Male   Other Topics Concern    None   Social History Narrative    None     Social Determinants of Health     Financial Resource Strain:     Difficulty of Paying Living Expenses: Not on file   Food Insecurity:     Worried About Running Out of Food in the Last Year: Not on file    Kendy of Food in the Last Year: Not on file   Transportation Needs:     Lack of Transportation (Medical):  Not on file    Lack of Transportation (Non-Medical): Not on file   Physical Activity:     Days of Exercise per Week: Not on file    Minutes of Exercise per Session: Not on file   Stress:     Feeling of Stress : Not on file   Social Connections:     Frequency of Communication with Friends and Family: Not on file    Frequency of Social Gatherings with Friends and Family: Not on file    Attends Restoration Services: Not on file    Active Member of [address] [place] [address] or Organizations: Not on file    Attends Club or Organization Meetings: Not on file    Marital Status: Not on file   Intimate Partner Violence:     Fear of Current or Ex-Partner: Not on file    Emotionally Abused: Not on file    Physically Abused: Not on file    Sexually Abused: Not on file   Housing Stability:     Unable to Pay for Housing in the Last Year: Not on file    Number of [place] in the Last Year: Not on file    Unstable Housing in the Last Year: Not on file       Physical Exam     Blood pressure (!) 145/79, pulse 96, temperature 97.7 °F (36.5 °C), temperature source Temporal, resp. rate 20, weight 165 lb (74.8 kg), SpO2 100 %.     General: ill-appearing, normal body habitus  HEENT: Atraumatic, normocephalic  Neck: Normal, no jugular venous system  Chest:  CTAB, good respiratory effort, good air movement  CV:  RRR, soft systolic murmur  Abdomen:  NTND, soft, +BS, no hepatosplenomegaly  Extremities:  No peripheral edema  Neurological: Unable to assess at this time  Lymphatics:  No palpable lymph nodes   Skin:  No rash, no jaundice  Psychiatric: Unable to assess    Data     Recent Labs     04/24/22  1228   WBC 8.5   HGB 11.3*   HCT 36.4*   MCV 92.6        Recent Labs     04/24/22  1228 04/24/22  1342   *  --    K 3.6 4.2   CL 80*  --    CO2 21*  --    GLUCOSE 728*  --    BUN 60*  --    CREATININE 5.2*  --    LABGLOM 9*  --    GFRAA 10*  --        Assessment:  End-stage renal disease  Seizure disorder with acute episode  Type 2 diabetes-uncontrolled  Urine tract infection  Altered mental status-postictal  Hyponatremia  Urinary tract infection      Plan:  · We will provide dialysis  · April 26. Follow-up labs. Continue propofol. Continue antibiotics.   Follow-up cultures    Thank you for asking us to participate in the management of your patient, please do not hesitate to contact me for any concerns regarding my recommendations as outlined above.    -----------------------------  Electronically signed by Jeffrey Alvarado MD on 4/24/22 at 5:29 PM CDT

## 2022-04-24 NOTE — ED PROVIDER NOTES
[facility]  eMERGENCY dEPARTMENT eNCOUnter      Pt Name: Siomara Julian  MRN: [id number]  [place] 1970  Date of evaluation: 4/24/2022  Provider: Laya Parnell MD    [address] [place] [address]       Chief Complaint   Patient presents with    Hyperglycemia    Other     see note         HISTORY OF PRESENT ILLNESS   (Location/Symptom, Timing/Onset,Context/Setting, Quality, Duration, Modifying Factors, Severity)  Note limiting factors. Siomara Julian is a 47 y.o. female who presents to the emergency department altered mental status elevated blood sugar. 67-year-old hemodialysis patient per EMS staff patient's spouse told him she became unresponsive about 30 minutes prior to their arrival.  Patient is lethargic voices that she is in the hospital but very limited history from her. She is lying on her left side. She has had BCs on her person. EMS reported feces on the couch. There was report that she had incontinence or feces during dialysis and was kicked out per the note. But there is a history of noncompliance seen in the chart as well. No one else is with the patient at this time. Spouse has not yet arrived. The history is provided by the patient, medical records and the EMS personnel. The history is limited by the condition of the patient. NursingNotes were reviewed. REVIEW OF SYSTEMS    (2-9 systems for level 4, 10 or more for level 5)     Review of Systems   Unable to perform ROS: Mental status change       A complete review of systems was performed and is negative except as noted above in the HPI.        PAST MEDICAL HISTORY     Past Medical History:   Diagnosis Date    Arthritis     Chronic kidney disease, stage 5, kidney failure ([address]     Closed fracture of right shoulder girdle, with routine healing, subsequent encounter     DM (diabetes mellitus) type II controlled with renal manifestation ([address] 06/1993    Gastroparesis     GERD (gastroesophageal reflux disease)     Hemodialysis patient [facility])     dialysis on tues, thur, and sat at [address] Hypertension     Hypothyroid     Nasal congestion     recent    Neuropathy     Noncompliance with medications     Palliative care patient 10/08/2019    Restless leg syndrome          SURGICAL HISTORY       Past Surgical History:   Procedure Laterality Date    BREAST SURGERY Left 2008    infected milk gland removed    BREAST SURGERY Right 5/25/2021    WEDGE EXCISION RIGHT BREAST DUCT WITH LACRIMAL DUCT PROBE, PEC BLOCK performed by Elizabeth Acosta MD at [facility], LAPAROSCOPIC      2008    COLONOSCOPY Left 9/17/2020    Dr Kim Walton hepatic flexure-Severe tortuosity with severe spasming, 1 yr recall    DIALYSIS FISTULA CREATION Left 1/25/2019    REVISION LEFT UPPER EXTREMITY AV ACCESS WITH LEFT BRACHIAL ARTERY TO LEFT AXILLARY VEIN WITH  INTERPOSITIONAL ARTEGRAFT   performed by Kyle Barrientos MD at [address]  2012    [address] Right 1/25/2019    INSERTION OF RIGHT INTERNAL JUGULAR HEMODIALYSIS CATHETER performed by Kyle Barrientos MD at [facility]  08/17/2018    1.  Moderately increased stainable iron identified by special stain in Kupffer cells and occasional hepatocytes. Negative for evidence of significant portal or lobular inflammation. Negative for evidence of significant fibrosis    RI COLONOSCOPY W/BIOPSY SINGLE/MULTIPLE N/A 10/20/2017    Dr [place] Double prep-Tubular AP (-) dysplasia x 2--3 yr recall    RI EGD TRANSORAL BIOPSY SINGLE/MULTIPLE N/A 12/27/2016    Dr Ruthann Marie EGD TRANSORAL BIOPSY SINGLE/MULTIPLE N/A 10/20/2017    Dr Sosa Vaca (-)   [address] V[facility]ULAR SURG[address] Left 2016    fistula by Dr Merlos Reasons at [address]  10/02/2017    [facility]. Left upper fistulograms/venograms, balloon angioplasty cephalic vein arch [id number] conquest.    VASCULAR SURGERY  08/2018    FISTULOGRAM    VASCULAR SURGERY  10/29/2018    [facility]. Left upper fistulograms/venograms    VASCULAR SURGERY  12/19/2018    S[facility]. Arch aortogram,left upper arteriograms.  VASCULAR SURGERY  03/06/2019    [facility]. Removal of tunneled dialyis catheter right internal jugular vein.  VASCULAR SURGERY  08/28/2019    [facility]. Left upper extremity fistulogram including venography to the superior vena cava. Balloon angioplasty left subclavian vein with 10mm x 40mm conquest balloon. Balloon angioplasty mid/proximal upper arm cephalic vein with 86RF x 40mm conquest balloon. Venograms after balloon angioplasty. Stent left mid/proximal upper arm cephalic vein stenosis fluency 10mm x 60mm self-expanding covered stent. Balloon     VASCULAR SURGERY  08/28/2019    cont angioplasty stent with 10mm x 40mm conquest balloon. Completion venograms left upper extremity.          CURRENT MEDICATIONS       Previous Medications    ALBUTEROL SULFATE  (90 BASE) MCG/ACT INHALER    Inhale 2 puffs into the lungs every 4 hours as needed    CLONIDINE (CATAPRES) 0.1 MG/24HR PTWK    Place 1 patch onto the skin once a week    DULOXETINE (CYMBALTA) 60 MG EXTENDED RELEASE CAPSULE    Take 1 capsule by mouth daily Hold Cymbalta while taking Zyvox Antibiotic    ERGOCALCIFEROL (VITAMIN D2) 10 MCG (400 UNIT) TABS    Take 1.25 mg by mouth every 14 days    HYDRALAZINE (APRESOLINE) 100 MG TABLET    Take 1 tablet by mouth every 8 hours    INSULIN ASPART (NOVOLOG FLEXPEN) 100 UNIT/ML INJECTION PEN    Inject 4 Units into the skin 3 times daily (before meals) Indications: Diabetes    INSULIN GLARGINE (BASAGLAR KWIKPEN) 100 UNIT/ML INJECTION PEN    Inject 5 Units into the skin nightly Indications: Diabetes    ISOSORBIDE MONONITRATE (IMDUR) 120 MG EXTENDED RELEASE TABLET    Take 1 tablet by mouth daily    LACTULOSE (CHRONULAC) 10 GM/15ML SOLUTION    Take 15 g by mouth 3 times daily With meals for constipation, hold for loose stools    LEVOTHYROXINE (SYNTHROID) 150 MCG TABLET    Take 150 mcg by mouth Daily    METOPROLOL SUCCINATE (TOPROL XL) 100 MG EXTENDED RELEASE TABLET    Take 1 tablet by mouth every morning (before breakfast)    NICOTINE (NICODERM CQ) 14 MG/24HR    Place 1 patch onto the skin daily    NIFEDIPINE (PROCARDIA XL) 30 MG EXTENDED RELEASE TABLET    Take 3 tablets by mouth daily    ROPINIROLE (REQUIP) 2 MG TABLET    Take 4 mg by mouth nightly Indications: Restless Leg Syndrome     SENNA (SENNA-LAX) 8.6 MG TABLET    Take 1 tablet by mouth daily    SEVELAMER (RENVELA) 800 MG TABLET    Take 1 tablet by mouth 3 times daily (with meals)    SIMVASTATIN (ZOCOR) 40 MG TABLET    Take 40 mg by mouth nightly        ALLERGIES     Penicillins, Lamisil advanced [terbinafine], Reglan [metoclopramide], and Stadol [butorphanol]    FAMILY HISTORY       Family History   Problem Relation Age of Onset    Colon Cancer Mother          at 61    Colon Polyps Mother     Lung Cancer Father          at 66    Colon Polyps Father     Stomach Cancer Sister     Breast Cancer Sister 27    Depression Daughter 25        committed suicide    Liver Cancer Neg Hx     Liver Disease Neg Hx     Esophageal Cancer Neg Hx     Rectal Cancer Neg Hx           SOCIAL HISTORY       Social History     Socioeconomic History    Marital status: Single     Spouse name: None    Number of children: 2    Years of education: None    Highest education level: None   Occupational History    None   Tobacco Use    Smoking status: Current Every Day Smoker     Packs/day: 0.50     Years: 33.00     Pack years: 16.50     Types: Cigarettes    Smokeless tobacco: Never Used    Tobacco comment: NOW at 1/4 PD, started at age 24 and has averaged 2 PPD   Vaping Use    Vaping Use: Never used   Substance and Sexual Activity    Alcohol use: No    Drug use: Not Currently     Types: Marijuana Alyson Dasen)    Sexual activity: Not Currently     Partners: Male   Other Topics Concern    None   Social History Narrative    None     Social Determinants of Health     Financial Resource Strain:     Difficulty of Paying Living Expenses: Not on file   Food Insecurity:     Worried About Running Out of Food in the Last Year: Not on file    Kendy of Food in the Last Year: Not on file   Transportation Needs:     Lack of Transportation (Medical): Not on file    Lack of Transportation (Non-Medical): Not on file   Physical Activity:     Days of Exercise per Week: Not on file    Minutes of Exercise per Session: Not on file   Stress:     Feeling of Stress : Not on file   Social Connections:     Frequency of Communication with Friends and Family: Not on file    Frequency of Social Gatherings with Friends and Family: Not on file    Attends Roman [employer] Services: Not on file    Active Member of [address] [place] [address] [employer] or Organizations: Not on file    Attends Club or Organization Meetings: Not on file    Marital Status: Not on file   Intimate Partner Violence:     Fear of Current or Ex-Partner: Not on file    Emotionally Abused: Not on file    Physically Abused: Not on file    Sexually Abused: Not on file   Housing Stability:     Unable to Pay for Housing in the Last Year: Not on file    Number of [place] in the Last Year: Not on file    Unstable Housing in the Last Year: Not on file       SCREENINGS    [place] Coma Scale  Eye Opening: To speech  Best Verbal Response: Confused  Best Motor Response: Obeys commands  Reading Coma Scale Score: 13        PHYSICAL EXAM    (up to 7 for level 4, 8 or more for level 5)     ED Triage Vitals   BP Temp Temp src Pulse Resp SpO2 Height Weight   -- -- -- -- -- -- -- --       Physical Exam  Vitals and nursing note reviewed. Constitutional:       Appearance: She is well-developed. Comments: Looks older than her stated age   HENT:      Head: Normocephalic and atraumatic.       Comments: Facial edema, generalized     Right Ear: External ear normal.      Left Ear: External ear normal.      Mouth/Throat:      Mouth: Mucous membranes are moist.      Pharynx: Oropharynx is clear.   Eyes:      General: No scleral icterus. Conjunctiva/sclera: Conjunctivae normal.      Pupils: Pupils are equal, round, and reactive to light. Comments: Pupils are midrange   Cardiovascular:      Rate and Rhythm: Normal rate and regular rhythm. Pulses: Normal pulses. Heart sounds: Normal heart sounds. No murmur heard. Pulmonary:      Effort: Pulmonary effort is normal. No respiratory distress. Breath sounds: Rales present. Abdominal:      General: Bowel sounds are normal.      Palpations: Abdomen is soft. Genitourinary:     Comments: Brown heme-negative stool noted on her buttocks area and person. Musculoskeletal:         General: Normal range of motion. Cervical back: Normal range of motion and neck supple. Skin:     General: Skin is warm and dry. Coloration: Skin is pale. Skin is not jaundiced. Neurological:      Mental Status: She is lethargic. GCS: GCS eye subscore is 3. GCS verbal subscore is 4. GCS motor subscore is 6. Comments: Generalized weakness I do not identify any unilateral deficit. DIAGNOSTIC RESULTS     EKG: All EKG's are interpreted by the Emergency Department Physician who either signs or Co-signs this chart in the absence of a cardiologist.    Sinus rhythm rate 75. AR interval 181. QTc 497. No ST or T wave abnormalities to suggest ischemia. RADIOLOGY:   Non-plain film images such as CT, Ultrasound and MRI are read by the radiologist. Plainradiographic images are visualized and preliminarily interpreted by the emergency physician with the below findings:    I reviewed the results. Interpretation per the Radiologist below, if available at the time of this note:    CT Head WO Contrast   Final Result   1. No acute intracranial process. Signed by Dr Gavin Older   Final Result   1. ET tube is in good position. Lungs are clear and well expanded.    2. Enteric tube curls on itself in the mid esophagus before extending   back up into the throat. This will need repositioned. The results of this exam were discussed with  [facility] on 4/24/2022   at 1329 hours   Signed by Dr Makenna Garcia   Final Result   No acute cardiopulmonary process.    Signed by Dr Juju Fierro            ED BEDSIDE ULTRASOUND:   Performed by ED Physician - none    LABS:  Labs Reviewed   CBC WITH AUTO DIFFERENTIAL - Abnormal; Notable for the following components:       Result Value    RBC 3.93 (*)     Hemoglobin 11.3 (*)     Hematocrit 36.4 (*)     MCHC 31.0 (*)     RDW 18.7 (*)     Neutrophils % 74.6 (*)     Lymphocytes % 16.5 (*)     Basophils % 1.1 (*)     All other components within normal limits   COMPREHENSIVE METABOLIC PANEL W/ REFLEX TO MG FOR LOW K - Abnormal; Notable for the following components:    Sodium 124 (*)     Chloride 80 (*)     CO2 21 (*)     Anion Gap 23 (*)     Glucose 728 (*)     BUN 60 (*)     CREATININE 5.2 (*)     GFR Non- 9 (*)     GFR African American 10 (*)     Alkaline Phosphatase 134 (*)     All other components within normal limits    Narrative:     CALL  [place]  [facility] tel. ,  Chemistry results called to and read back by Jennifer haddad er, 04/24/2022 13:01,  by [facility]   BLOOD GAS, ARTERIAL - Abnormal; Notable for the following components:    pH, Arterial 7.330 (*)     pO2, Arterial 389.0 (*)     HCO3, Arterial 21.6 (*)     Base Excess, Arterial -4.1 (*)     Hemoglobin, Art, Extended 10.8 (*)     All other components within normal limits   TROPONIN - Abnormal; Notable for the following components:    Troponin 0.18 (*)     All other components within normal limits    Narrative:     CALL  [place]  [facility] tel. ,  Chemistry results called to and read back by Jennifer haddad er, 04/24/2022 13:01,  by Laurie Olivia - Abnormal; Notable for the following components:    Pro-BNP 35,000 (*)     All other components within normal limits   URINALYSIS WITH REFLEX TO CULTURE - Abnormal; Notable for the following components:    Blood, Urine MODERATE (*)     Leukocyte Esterase, Urine MODERATE (*)     All other components within normal limits   TSH - Abnormal; Notable for the following components:    TSH 14.190 (*)     All other components within normal limits    Narrative:     CALL  [place]  [facility] tel. ,  Chemistry results called to and read back by Jennifer haddad er, 04/24/2022 13:01,  by Liliana E Darrick Montenegro - Abnormal; Notable for the following components:    WBC, UA TNTC (*)     RBC, UA 6-10 (*)     Bacteria, UA Rare (*)     All other components within normal limits   CULTURE, URINE   CULTURE, BLOOD 1   CULTURE, BLOOD 2   URINE DRUG SCREEN   ETHANOL   HEMOGLOBIN A1C   POCT GLUCOSE   POCT GLUCOSE   POCT GLUCOSE   POCT GLUCOSE   POCT GLUCOSE   POCT GLUCOSE   POCT GLUCOSE   POCT GLUCOSE   POCT GLUCOSE   POCT GLUCOSE   POCT GLUCOSE   POCT GLUCOSE   POCT GLUCOSE   POCT GLUCOSE       All other labs were within normal range or not returned as of this dictation. EMERGENCY DEPARTMENT COURSE and DIFFERENTIALDIAGNOSIS/MDM:   Vitals:    Vitals:    04/24/22 1230 04/24/22 1247 04/24/22 1319 04/24/22 1344   BP: (!) 145/79      Pulse: 79 96     Resp: 20   20   Temp: 97.7 °F (36.5 °C)      TempSrc: Temporal      SpO2: 95% 100%  100%   Weight:   165 lb (74.8 kg)        MDM  Number of Diagnoses or Management Options  ESRD (end stage renal disease) on dialysis (HCC)  Hyperglycemic coma (HCC)  Pyuria  Seizure ([facility]  Diagnosis management comments: 1:16 PM.  Patient had a witnessed seizure tonic-clonic while being transported to a different room she was on the stretcher at the time. Does upper airway obstruction and a nasal trumpet was placed. I attempted to contact significant others and partners. And was unable to reach anyone by phone however her sister had called to inquire about the patient. Her sister states the patient is a full code.   Because in December 2021 there was a note arise DNR in the chart. But I did not see that the patient had signed that. It looks like her significant other initiated it. The sister tells me that just 2 days ago the patient states she would want everything done. I explained to the sister the dire condition of her sister and that we were proceeding to intubate her now. Intubation has been accomplished. I will continue the evaluation the emergency department and contact the ICU attending for admission once we have stabilized the patient. I let the sister know the prognosis is very grave. She should pass along to the appropriate family members. 2:54 PM.  I discussed the case with Dr. Shirleen Mcburney the ICU attending. Her urine did show too numerous to count WBCs and rare bacteria. I went ahead and initiate some sepsis orders except for the fluid because her hemodialysis status. She does not need emergent hemodialysis and blood pressure is maintaining much better now. I also initiated insulin drip because of her extreme hyperglycemia. And her anion gap shifted though there were no ketones detected in her urine. May be a hyperglycemic coma. CT head was negative. CONSULTS:  IP CONSULT TO HOSPITALIST  IP CONSULT TO NEPHROLOGY    PROCEDURES:  Unless otherwise notedbelow, none     Intubation    Date/Time: 4/24/2022 1:17 PM  Performed by: Loyd Lopez MD  Authorized by: Loyd Lopez MD     Consent:     Consent obtained:  Emergent situation (Patient unresponsive)    Procedural risks discussed: I briefly discussed risk with the sister. Pre-procedure details:     Patient status:  Unresponsive    Mallampati score:  IV    Pretreatment meds: Ativan and etomidate. Paralytics:  Succinylcholine  Procedure details:     Preoxygenation:  Bag valve mask    CPR in progress: no      Intubation method:  Oral    Oral intubation technique:  Video-assisted    Laryngoscope blade:   Mac 4    Tube size (mm):  7.5    Number of attempts:  2    Ventilation between attempts: yes      Cricoid pressure: yes      Tube visualized through cords: yes    Placement assessment:     ETT to teeth:  23    Tube secured with:  ETT khan    Breath sounds:  Equal    Placement verification: chest rise, condensation, CXR verification, direct visualization, equal breath sounds, ETCO2 detector and tube exhalation      CXR findings:  ETT in proper place  Post-procedure details:     Patient tolerance of procedure: Tolerated well, no immediate complications        FINAL IMPRESSION     1. Seizure ([facility] [address]    2. ESRD (end stage renal disease) on dialysis ([facility])    3. Pyuria    4.  Hyperglycemic coma ([facility] [address] [address]          DISPOSITION/PLAN   DISPOSITION        PATIENT REFERRED TO:  @FUP@    DISCHARGE MEDICATIONS:  New Prescriptions    No medications on file          (Please note that portions of this note were completed with a voice recognition program.  Efforts were made to edit the dictations butoccasionally words are mis-transcribed.)    Lilia Strong MD (electronically signed)  AttendingEmergency Physician          Rose Marie Jarrett MD  04/24/22 0055

## 2022-04-24 NOTE — ED NOTES
Dr Marlen Greenberg contacted pts sister to verify pts code status. Per Dr Marlen Greenberg, Pts sister spoke with her 2 days ago and pt told sister the she would want everything for her if she would need to be resuscitated and be intubated.       Concetta Crigler, RN  04/24/22 5287

## 2022-04-24 NOTE — PROGRESS NOTES
Contains abnormal data Blood Gas, Arterial     Status: Final result    Component Ref Range & Units 04/24/22 1342   pH, Arterial 7.350 - 7.450 7.330 Low     pCO2, Arterial 35.0 - 45.0 mmHg 41.0    pO2, Arterial 80.0 - 100.0 mmHg 389. 0 High     HCO3, Arterial 22.0 - 26.0 mmol/L 21.6 Low     Base Excess, Arterial -2.0 - 2.0 mmol/L -4.1 Low     Hemoglobin, Art, Extended 12.0 - 16.0 g/dL 10.8 Low     O2 Sat, Arterial >92 % 97.1    Carboxyhgb, Arterial 0.0 - 5.0 % 1.6    Comment:      0.0-1.5   (Smokers 1.5-5.0)    Methemoglobin, Arterial <1.5 % 0.8    O2 Content, Arterial Not Established mL/dL 15.8    O2 Therapy  Unknown    FIO2 Not Established % 100.0    Positive End EXP Press  5    ALLENS TEST  NA    Sample Source  RR    Vt Mechanical Not Established % 450.0    Potassium, Whole Blood  4.2    Resulting Agency  [facility] [place] [place] [facility]        Specimen Collected: 04/24/22 1342 Last Resulted: 04/24/22 1343 View Other Order Details        VC 16 450 100% 5 peep site RR

## 2022-04-24 NOTE — PROGRESS NOTES
4 Eyes Skin Assessment    Lei Barclay is being assessed upon: Admission    I agree that I, Paul Palma RN, along with Yolis Delgado (either 2 RN's or 1 LPN and 1 RN) have performed a thorough Head to Toe Skin Assessment on the patient. ALL assessment sites listed below have been assessed. Areas assessed by both nurses:     [x]   Head, Face, and Ears   [x]   Shoulders, Back, and Chest  [x]   Arms, Elbows, and Hands   [x]   Coccyx, Sacrum, and Ischium  [x]   Legs, Feet, and Heels    Does the Patient have Skin Breakdown?  No    Marek Prevention initiated: No  Wound Care Orders initiated: No    WOC nurse consulted for Pressure Injury (Stage 3,4, Unstageable, DTI, NWPT, and Complex wounds) and New or Established Ostomies: No        Primary Nurse eSignature: Paul Palma RN on 4/24/2022 at 4:23 PM      Co-Signer eSignature: {Esignature:[id number]}

## 2022-04-24 NOTE — ED TRIAGE NOTES
noncompliant [facility] pt that according to spouse pooped herselg at [facility] yesterday and they \"kicked her out\" spouse states she went home and cleaned herself up yesterday. Today he called because she was unresponsive and on EMS arrival she was unresponsive covered in fecal matter. EMS states GCS of 11 on arrival now 12-13. Pt spouse says she has been unresponsive for 30-40 mins.

## 2022-04-24 NOTE — H&P
[facility] Medicine H&P    Patient:  Rosalia Feng  MRN: [id number]    Consulting Physician: Alphonso Souza DO  Reason for Consult: Medical Management  Primacy Care Physician: ROMINA Contreras    HISTORY OF PRESENT ILLNESS:   The patient is a 47 y.o. female comes in with respiratory distress. Her blood glucose was greater than 700. While in the emergency department, she had a witnessed seizure. She was intubated for airway protection. She will be admitted to the ICU for close monitoring. We will ask nephrology to assist with her dialysis management. She has a history of end-stage renal disease. She is currently on assist-control rate of 16, tidal volume of 500, 5 of PEEP, FiO2 of 0.5.     Past Medical History:        Diagnosis Date    Arthritis     Chronic kidney disease, stage 5, kidney failure (HCC)     Closed fracture of right shoulder girdle, with routine healing, subsequent encounter     DM (diabetes mellitus) type II controlled with renal manifestation ([facility] [address] 06/1993    Gastroparesis     GERD (gastroesophageal reflux disease)     Hemodialysis patient ([facility] [address]     dialysis on es, thur, and sat at [facility] clinic    Hypertension     Hypothyroid     Nasal congestion     recent    Neuropathy     Noncompliance with medications     Palliative care patient 10/08/2019    Restless leg syndrome        Past Surgical History:        Procedure Laterality Date    BREAST SURGERY Left 2008    infected milk gland removed    BREAST SURGERY Right 5/25/2021    WEDGE EXCISION RIGHT BREAST DUCT WITH LACRIMAL DUCT PROBE, PEC BLOCK performed by Kimberly Rodas MD at [address] [place] [address], LAPAROSCOPIC      2008    COLONOSCOPY Left 9/17/2020    Dr Colton Rendon hepatic flexure-Severe tortuosity with severe spasming, 1 yr recall    DIALYSIS FISTULA CREATION Left 1/25/2019    REVISION LEFT UPPER EXTREMITY AV ACCESS WITH LEFT BRACHIAL ARTERY TO LEFT AXILLARY VEIN WITH  INTERPOSITIONAL ARTEGRAFT   performed by Alireza Arellano MD at [address]  2012    [address] Right 1/25/2019    INSERTION OF RIGHT INTERNAL JUGULAR HEMODIALYSIS CATHETER performed by Alireza Arellano MD at [address]  08/17/2018    1.  Moderately increased stainable iron identified by special stain in Kupffer cells and occasional hepatocytes. Negative for evidence of significant portal or lobular inflammation. Negative for evidence of significant fibrosis    OH COLONOSCOPY W/BIOPSY SINGLE/MULTIPLE N/A 10/20/2017    Dr Angely Seals prep-Tubular AP (-) dysplasia x 2--3 yr recall    OH EGD TRANSORAL BIOPSY SINGLE/MULTIPLE N/A 12/27/2016    Dr Lotus He EGD TRANSORAL BIOPSY SINGLE/MULTIPLE N/A 10/20/2017    Dr Stefanie Dobbins (-)   [facility] VASCULAR SURGERY Left 2016    fistula by Dr Stefano Loera at [address]  10/02/2017    SJS. Left upper fistulograms/venograms, balloon angioplasty cephalic vein arch 37V63 conquest.    VASCULAR SURGERY  08/2018    FISTULOGRAM    VASCULAR SURGERY  10/29/2018    SJS. Left upper fistulograms/venograms    VASCULAR SURGERY  12/19/2018    SJS. Arch aortogram,left upper arteriograms.  VASCULAR SURGERY  03/06/2019    SJS. Removal of tunneled dialyis catheter right internal jugular vein.  VASCULAR SURGERY  08/28/2019    SJS. Left upper extremity fistulogram including venography to the superior vena cava. Balloon angioplasty left subclavian vein with 10mm x 40mm conquest balloon. Balloon angioplasty mid/proximal upper arm cephalic vein with 64PB x 40mm conquest balloon. Venograms after balloon angioplasty. Stent left mid/proximal upper arm cephalic vein stenosis fluency 10mm x 60mm self-expanding covered stent. Balloon     VASCULAR SURGERY  08/28/2019    cont angioplasty stent with 10mm x 40mm conquest balloon. Completion venograms left upper extremity.        Medications: Scheduled Meds:   propofol  5-50 mcg/kg/min IntraVENous Once    vancomycin 25 mg/kg IntraVENous Once     Continuous Infusions:   insulin      dextrose       PRN Meds:.glucagon (rDNA), dextrose, glucose, dextrose bolus (hypoglycemia) **OR** dextrose bolus (hypoglycemia)    Allergies:  Penicillins, Lamisil advanced [terbinafine], Reglan [metoclopramide], and Stadol [butorphanol]    Social History:   TOBACCO:   reports that she has been smoking cigarettes. She has a 16.50 pack-year smoking history. She has never used smokeless tobacco.  ETOH:   reports no history of alcohol use. Family History:       Problem Relation Age of Onset    Colon Cancer Mother          at 63    Colon Polyps Mother     Lung Cancer Father          at 79    Colon Polyps Father     Stomach Cancer Sister     Breast Cancer Sister 27    Depression Daughter 25        committed suicide    Liver Cancer Neg Hx     Liver Disease Neg Hx     Esophageal Cancer Neg Hx     Rectal Cancer Neg Hx        ROS:  Review of Systems   Unable to perform ROS: Intubated       Physical Exam:    Vitals: BP (!) 145/79   Pulse 96   Temp 97.7 °F (36.5 °C) (Temporal)   Resp 20   Wt 165 lb (74.8 kg)   SpO2 100%   BMI 26.63 kg/m²     Physical Exam  Vitals and nursing note reviewed. Constitutional:       Appearance: She is ill-appearing. Interventions: She is sedated and intubated. HENT:      Head: Normocephalic and atraumatic. Right Ear: External ear normal.      Left Ear: External ear normal.      Nose: Nose normal.      Mouth/Throat:      Mouth: Mucous membranes are moist.   Eyes:      Conjunctiva/sclera: Conjunctivae normal.      Pupils: Pupils are equal, round, and reactive to light. Cardiovascular:      Rate and Rhythm: Normal rate and regular rhythm. Heart sounds: Normal heart sounds. Pulmonary:      Effort: Pulmonary effort is normal. She is intubated. Breath sounds: Normal breath sounds. Abdominal:      General: Abdomen is flat. Palpations: Abdomen is soft.    Musculoskeletal: Cervical back: Neck supple. No rigidity. No muscular tenderness. Skin:     General: Skin is warm and dry. Neurological:      Mental Status: She is alert. CBC:   Recent Labs     04/24/22  1228   WBC 8.5   HGB 11.3*        BMP:    Recent Labs     04/24/22  1228 04/24/22  1342   *  --    K 3.6 4.2   CL 80*  --    CO2 21*  --    BUN 60*  --    CREATININE 5.2*  --    GLUCOSE 728*  --      Hepatic:   Recent Labs     04/24/22  1228   AST 19   ALT 15   BILITOT 0.7   ALKPHOS 134*     Troponin:   Recent Labs     04/24/22  1228   TROPONINI 0.18*     BNP: No results for input(s): BNP in the last 72 hours. Lipids: No results for input(s): CHOL, HDL in the last 72 hours. Invalid input(s): LDLCALCU  ABGs:   Lab Results   Component Value Date    PHART 7.330 04/24/2022    PO2ART 389.0 04/24/2022    WTF7JUW 41.0 04/24/2022     INR: No results for input(s): INR in the last 72 hours. -----------------------------------------------------------------  CT Head WO Contrast    Result Date: 4/24/2022  CT HEAD WO CONTRAST 4/24/2022 2:01 PM HISTORY: Altered mental status COMPARISON: CT scan dated 11/18/2021 DOSE LENGTH PRODUCT: 766 mGy cm TECHNIQUE: Helical tomographic images of the brain were obtained without the use of intravenous contrast. Automated exposure control was also utilized to decrease patient radiation dose. FINDINGS: There is no evidence of evolving large vascular territory infarct. No visualized intra-axial or extra-axial hemorrhage. No mass lesion is identified. Normal size and configuration of the ventricular system. The basal cisterns are symmetric. Posterior fossa structures are unremarkable. The included orbits and their contents are unremarkable. Chronic paranasal sinus disease on the left. The visualized osseous structures and overlying soft tissues of the skull and face are unremarkable. 1. No acute intracranial process.  Signed by Dr April Clark    XR CHEST PORTABLE    Result Date: hyperglycemia. Insulin infusion. End-stage renal disease. Consultation with nephrology for dialysis management. Please document 35 minutes of critical care time for patient assessment, chart review, discussion with staff, .       Audrey Chau DO

## 2022-04-24 NOTE — CONSULTS
[facility] Neurology Consult      Patient:   John Pedersen  MR#:    [id number]  Account Number:                   [id number]      Room:    [facility]-   YOB: 1970  Date of Progress Note: 4/24/2022  Time of Note                           4:42 PM  Attending Physician:  Skinny Dodd DO  Consulting Physician:  Yola Benson DO       CHIEF COMPLAINT:  Seizure     HISTORY OF PRESENT ILLNESS:   This is a 47 y.o. female who was seen initially in the ER after a on responsive episode. She later had a witnessed generalized tonic-clonic event in the emergency department. She was unable to maintain her airway and required intubation. She was loaded with Keppra and currently on propofol drip in the ICU. She has a history of end-stage renal disease and is on hemodialysis. She has a history of noncompliance. She was also profoundly hyperglycemic with a blood glucose level of 728. Hyponatremia also noted with sodium of 124. Too numerous to count white blood cells noted in the urine with some concern for underlying urosepsis. Head CT revealed nothing acute.     REVIEW OF SYSTEMS:  Unable to obtain     PAST MEDICAL HISTORY:      Diagnosis Date    Arthritis     Chronic kidney disease, stage 5, kidney failure ([facility] [address]     Closed fracture of right shoulder girdle, with routine healing, subsequent encounter     DM (diabetes mellitus) type II controlled with renal manifestation ([facility] [address] 06/1993    Gastroparesis     GERD (gastroesophageal reflux disease)     Hemodialysis patient ([facility] [address]     dialysis on tues, thur, and sat at [facility] clinic    Hypertension     Hypothyroid     Nasal congestion     recent    Neuropathy     Noncompliance with medications     Palliative care patient 10/08/2019    Restless leg syndrome        PAST SURGICAL HISTORY:      Procedure Laterality Date    BREAST SURGERY Left 2008    infected milk gland removed    BREAST SURGERY Right 5/25/2021    WEDGE EXCISION RIGHT BREAST DUCT WITH LACRIMAL DUCT PROBE, PEC BLOCK performed by Ioana Louis MD at [address], LAPAROSCOPIC      2008    COLONOSCOPY Left 9/17/2020    Dr Annabelle Rubi hepatic flexure-Severe tortuosity with severe spasming, 1 yr recall    DIALYSIS FISTULA CREATION Left 1/25/2019    REVISION LEFT UPPER EXTREMITY AV ACCESS WITH LEFT BRACHIAL ARTERY TO LEFT AXILLARY VEIN WITH  INTERPOSITIONAL ARTEGRAFT   performed by Asencion Essex, MD at [address]  2012    [address] Right 1/25/2019    INSERTION OF RIGHT INTERNAL JUGULAR HEMODIALYSIS CATHETER performed by Asencion Essex, MD at [address] [facility]  08/17/2018    1.  Moderately increased stainable iron identified by special stain in Kupffer cells and occasional hepatocytes. Negative for evidence of significant portal or lobular inflammation. Negative for evidence of significant fibrosis    CO COLONOSCOPY W/BIOPSY SINGLE/MULTIPLE N/A 10/20/2017    Dr Paige Francois prep-Tubular AP (-) dysplasia x 2--3 yr recall    CO EGD TRANSORAL BIOPSY SINGLE/MULTIPLE N/A 12/27/2016    Dr Genevieve Garcia EGD TRANSORAL BIOPSY SINGLE/MULTIPLE N/A 10/20/2017    Dr Leon Gagnon (-)   [address] [facility] VASCULAR SURGERY Left 2016    fistula by Dr Tino Shahid at [address]  10/02/2017    SJS. Left upper fistulograms/venograms, balloon angioplasty cephalic vein arch [id number] conquest.    VASCULAR SURGERY  08/2018    FISTULOGRAM    VASCULAR SURGERY  10/29/2018    SJS. Left upper fistulograms/venograms    VASCULAR SURGERY  12/19/2018    SJS. Arch aortogram,left upper arteriograms.  VASCULAR SURGERY  03/06/2019    SJS. Removal of tunneled dialyis catheter right internal jugular vein.  VASCULAR SURGERY  08/28/2019    SJS. Left upper extremity fistulogram including venography to the superior vena cava. Balloon angioplasty left subclavian vein with 10mm x 40mm conquest balloon. Balloon angioplasty mid/proximal upper arm cephalic vein with 10mm x 40mm conquest balloon. Venograms after balloon angioplasty. Stent left mid/proximal upper arm cephalic vein stenosis fluency 10mm x 60mm self-expanding covered stent. Balloon     VASCULAR SURGERY  2019    cont angioplasty stent with 10mm x 40mm conquest balloon. Completion venograms left upper extremity. SOCIAL HISTORY:   TOBACCO:   reports that she has been smoking cigarettes. She has a 16.50 pack-year smoking history. She has never used smokeless tobacco.  ETOH:   reports no history of alcohol use.   DRUG:    Social History     Substance and Sexual Activity   Drug Use Not Currently    Types: Marijuana (Weed)       FAMILY HISTORY:       Problem Relation Age of Onset    Colon Cancer Mother          at 61    Colon Polyps Mother     Lung Cancer Father          at 66    Colon Polyps Father     Stomach Cancer Sister     Breast Cancer Sister 27    Depression Daughter 25        committed suicide    Liver Cancer Neg Hx     Liver Disease Neg Hx     Esophageal Cancer Neg Hx     Rectal Cancer Neg Hx        MEDICATIONS:      Current Facility-Administered Medications:     propofol injection, 5-50 mcg/kg/min, IntraVENous, Once, Margarito Partida, DO, Last Rate: 11.2 mL/hr at 22 1505, 25 mcg/kg/min at 22 1505    insulin regular (HUMULIN R;NOVOLIN R) 100 Units in sodium chloride 0.9 % 100 mL infusion, 0.5 Units/hr, IntraVENous, Continuous, Superior Partida, DO, Last Rate: 0.5 mL/hr at 22 1517, 0.5 Units/hr at 22 1517    glucagon (rDNA) injection 1 mg, 1 mg, IntraMUSCular, PRN, Margarito Partida, DO    dextrose 5 % solution, 100 mL/hr, IntraVENous, PRN, Margarito Partida, DO    vancomycin (VANCOCIN) 1,750 mg in dextrose 5 % 500 mL IVPB, 25 mg/kg, IntraVENous, Once, Margarito Partida, DO, Last Rate: 250 mL/hr at 22 1604, 1,750 mg at 22 1604    glucose chewable tablet 16 g, 4 tablet, Oral, PRN, Margarito Partida, DO    dextrose bolus (hypoglycemia) 10% 125 mL, 125 mL, IntraVENous, PRN **OR** dextrose bolus (hypoglycemia) 10% 250 mL, 250 mL, IntraVENous, PRN, Fannie Waumandee, DO    sodium chloride flush 0.9 % injection 5-40 mL, 5-40 mL, IntraVENous, 2 times per day, Fannie Waumandee, DO    sodium chloride flush 0.9 % injection 5-40 mL, 5-40 mL, IntraVENous, PRN, Fannie Waumandee, DO    0.9 % sodium chloride infusion, , IntraVENous, PRN, Fannie Waumandee, DO    LORazepam (ATIVAN) injection 1 mg, 1 mg, IntraVENous, Q5 Min PRN, Fannie Waumandee, DO    heparin (porcine) injection 5,000 Units, 5,000 Units, SubCUTAneous, TID, Fannie Waumandee, DO    ondansetron (ZOFRAN-ODT) disintegrating tablet 4 mg, 4 mg, Oral, Q8H PRN **OR** ondansetron (ZOFRAN) injection 4 mg, 4 mg, IntraVENous, Q6H PRN, Fannie Waumandee, DO    polyethylene glycol (GLYCOLAX) packet 17 g, 17 g, Oral, Daily PRN, Fannie Waumandee, DO    acetaminophen (TYLENOL) tablet 650 mg, 650 mg, Oral, Q6H PRN **OR** acetaminophen (TYLENOL) suppository 650 mg, 650 mg, Rectal, Q6H PRN, Fannie Nick, DO    [START ON 4/25/2022] levETIRAcetam (KEPPRA) 500 mg/100 mL IVPB, 500 mg, IntraVENous, Q12H, Fannie Nick, DO    chlorhexidine (PERIDEX) 0.12 % solution 15 mL, 15 mL, Mouth/Throat, BID, Fannie Nick, DO    famotidine (PEPCID) 20 mg in sodium chloride (PF) 10 mL injection, 20 mg, IntraVENous, Daily, Fanniejosiane Romero, DO    propofol injection, 5-50 mcg/kg/min, IntraVENous, Continuous, Fannie Nick, DO    magic butt cream, , Topical, Q4H PRN, Fannie Waumandee, DO    ALLERGIES:    Penicillins, Lamisil advanced [terbinafine], Reglan [metoclopramide], and Stadol [butorphanol]    PHYSICAL EXAM:    Constitutional    BP (!) 145/79   Pulse 96   Temp 97.7 °F (36.5 °C) (Temporal)   Resp 20   Wt 165 lb (74.8 kg)   SpO2 100%   BMI 26.63 kg/m²   General appearance: Intubated, sedated   EYES -   Conjunctiva normal  Pupillary exam as below, see CN exam in the neurologic exam  ENT-    No scars, masses, or lesions over external nose or ears  Oropharynx - intubated  Cardiovascular -   No clubbing, cyanosis  Pulmonary-   Mechanically ventilated   Musculoskeletal    No significant wasting of muscles noted  Gait as below, see gait exam in the neurologic exam  Muscle strength, tone, stability as below see the motor exam in the neurologic exam.   No bony deformities  Skin    Warm, dry, and intact to inspection and palpation. No rash, erythema, or pallor      NEUROLOGICAL EXAM    Mental status   [] Awake, alert, oriented   [] Affect attention and concentration appear appropriate  [] Recent and remote memory appears unremarkable  [] Speech normal without dysarthria or aphasia, comprehension and repetition intact.    COMMENTS:  Unresponsive to voice, not following commands, sedated    Cranial Nerves [] No VF deficit to confrontation,  optic discs normal, no papilledema on fundoscopic exam.  [] PERRLA, EOMI, no nystagmus, conjugate eye movements, no ptosis  [] Face symmetric  [] Facial sensation intact  [] Tongue midline no atrophy or fasciculations present  [] Palate midline, hearing to finger rub normal  [] Shoulder shrug and SCM testing normal  COMMENTS:  PERRL, Face appears sym, OCR present   Motor   [] 5/5 strength x 4 extremities  [x] Normal bulk and tone  [x] No tremor present  [] No rigidity or bradykinesia noted  COMMENTS: Withdrawal x 4   Sensory  [] Sensation intact to light touch, pin prick, vibration, and proprioception BLE  [] Sensation intact to light touch, pin prick, vibration, and proprioception BUE  COMMENTS: Limited    Coordination [] FTN normal bilaterally   [] HTS normal bilaterally  [] SANFORD normal.   COMMENTS: Limited    Reflexes  [x] Symmetric and non-pathological  [x] Toes downgoing bilaterally  [x] No clonus present  COMMENTS:   Gait                  [] Normal steady gait    [] Ataxic    [] Spastic     [] Magnetic     [] Shuffling  [x] Not assessed  COMMENTS:       LABS/IMAGING:    As below and per HPI    CT Head WO Contrast    Result Date: 4/24/2022  CT HEAD WO CONTRAST 4/24/2022 2:01 PM HISTORY: Altered mental status COMPARISON: CT scan dated 11/18/2021 DOSE LENGTH PRODUCT: 766 mGy cm TECHNIQUE: Helical tomographic images of the brain were obtained without the use of intravenous contrast. Automated exposure control was also utilized to decrease patient radiation dose. FINDINGS: There is no evidence of evolving large vascular territory infarct. No visualized intra-axial or extra-axial hemorrhage. No mass lesion is identified. Normal size and configuration of the ventricular system. The basal cisterns are symmetric. Posterior fossa structures are unremarkable. The included orbits and their contents are unremarkable. Chronic paranasal sinus disease on the left. The visualized osseous structures and overlying soft tissues of the skull and face are unremarkable. 1. No acute intracranial process. Signed by Dr Alin Zavaleta    Result Date: 4/24/2022  XR CHEST PORTABLE 4/24/2022 1:17 PM HISTORY: ET tube placement  Technique: Single AP view of the chest COMPARISONS: 4/24/2022 FINDINGS: Status post endotracheal intubation. ET tube is in good position. Lungs are well-expanded. Enteric tube curls on itself in the midesophagus and then extends back up into the throat. Heart size is stable. Pulmonary vasculature are nondilated. 1. ET tube is in good position. Lungs are clear and well expanded. 2. Enteric tube curls on itself in the mid esophagus before extending back up into the throat. This will need repositioned.  The results of this exam were discussed with Dr. Yvrose Mason on 4/24/2022 at 1329 hours Signed by Dr Babs Valdes    XR CHEST PORTABLE    Result Date: 4/24/2022  XR CHEST PORTABLE 4/24/2022 12:34 PM HISTORY: Altered mental status  Technique: Single AP view of the chest COMPARISONS: Chest exam dated 4/1/2022 FINDINGS: No lung consolidation. No pleural effusion or pneumothorax. Cardiomediastinal silhouette and pulmonary vasculature are unremarkable. No acute bony abnormality. Left subclavian region vascular stent with additional stent projecting over the left humerus. No acute cardiopulmonary process. Signed by Dr Mohini Prakash     04/24/22  1228   WBC 8.5   HGB 11.3*        Recent Labs     04/24/22  1228 04/24/22  1342   *  --    K 3.6 4.2   CL 80*  --    CO2 21*  --    BUN 60*  --    CREATININE 5.2*  --    GLUCOSE 728*  --      Recent Labs     04/24/22  1228   AST 19   ALT 15   BILITOT 0.7   ALKPHOS 134*       ASSESSMENT:  47 y.o. admitted after an episode of unresponsiveness followed by generalized tonic-clonic event in the emergency department. She has profoundly elevated glucose possible hyperglycemic coma, hyponatremia, underlying UTI possible urosepsis noted, end-stage renal disease with a history of noncompliance noted as well. Head CT nothing acute. PLAN:  1. MRI brain when able  2. cEEG monitoring  3. Continue Keppra, on propofol as well   4. Continue addressing blood glucose, hyponatremia, UTI, ESRD    Please feel free to call with any questions. [phone] (cell phone).     Bucky Murray DO  Board Certified Neurology

## 2022-04-24 NOTE — PROGRESS NOTES
Pt intubated per Dr. Bj Dumont with 7.5 e tube at 23 cm at teeth.  Pt placed on vent at VC 16 450 100% 5 peep  ABGs and CXR pending

## 2022-04-24 NOTE — ED NOTES
20 of Etomidate in 1308  100 of succs in 1038  Positive color change 1312  7.5 tube at 23 at the [facility], RN  04/24/22 8032

## 2022-04-25 PROBLEM — E11.00 UNCONTROLLED TYPE 2 DIABETES MELLITUS WITH HYPEROSMOLAR NONKETOTIC HYPERGLYCEMIA (HCC): Status: ACTIVE | Noted: 2020-10-21

## 2022-04-25 LAB
ANION GAP SERPL CALCULATED.3IONS-SCNC: 16 MMOL/L (ref 7–19)
ANION GAP SERPL CALCULATED.3IONS-SCNC: 17 MMOL/L (ref 7–19)
ANION GAP SERPL CALCULATED.3IONS-SCNC: 19 MMOL/L (ref 7–19)
BUN BLDV-MCNC: 59 MG/DL (ref 6–20)
BUN BLDV-MCNC: 65 MG/DL (ref 6–20)
BUN BLDV-MCNC: 65 MG/DL (ref 6–20)
CALCIUM SERPL-MCNC: 7.8 MG/DL (ref 8.6–10)
CALCIUM SERPL-MCNC: 8.7 MG/DL (ref 8.6–10)
CALCIUM SERPL-MCNC: 9 MG/DL (ref 8.6–10)
CHLORIDE BLD-SCNC: 78 MMOL/L (ref 98–111)
CHLORIDE BLD-SCNC: 84 MMOL/L (ref 98–111)
CHLORIDE BLD-SCNC: 87 MMOL/L (ref 98–111)
CO2: 20 MMOL/L (ref 22–29)
CO2: 23 MMOL/L (ref 22–29)
CO2: 23 MMOL/L (ref 22–29)
CREAT SERPL-MCNC: 5.1 MG/DL (ref 0.5–0.9)
CREAT SERPL-MCNC: 5.5 MG/DL (ref 0.5–0.9)
CREAT SERPL-MCNC: 5.7 MG/DL (ref 0.5–0.9)
EKG P AXIS: 48 DEGREES
EKG P-R INTERVAL: 170 MS
EKG Q-T INTERVAL: 420 MS
EKG QRS DURATION: 112 MS
EKG QTC CALCULATION (BAZETT): 446 MS
EKG T AXIS: 97 DEGREES
GFR AFRICAN AMERICAN: 10
GFR AFRICAN AMERICAN: 11
GFR AFRICAN AMERICAN: 9
GFR NON-AFRICAN AMERICAN: 8
GFR NON-AFRICAN AMERICAN: 8
GFR NON-AFRICAN AMERICAN: 9
GLUCOSE BLD-MCNC: 125 MG/DL (ref 70–99)
GLUCOSE BLD-MCNC: 126 MG/DL (ref 70–99)
GLUCOSE BLD-MCNC: 127 MG/DL (ref 70–99)
GLUCOSE BLD-MCNC: 162 MG/DL (ref 70–99)
GLUCOSE BLD-MCNC: 200 MG/DL (ref 70–99)
GLUCOSE BLD-MCNC: 201 MG/DL (ref 74–109)
GLUCOSE BLD-MCNC: 245 MG/DL (ref 70–99)
GLUCOSE BLD-MCNC: 327 MG/DL (ref 70–99)
GLUCOSE BLD-MCNC: 383 MG/DL (ref 70–99)
GLUCOSE BLD-MCNC: 404 MG/DL (ref 74–109)
GLUCOSE BLD-MCNC: 405 MG/DL (ref 74–109)
GLUCOSE BLD-MCNC: 49 MG/DL (ref 70–99)
GLUCOSE BLD-MCNC: 539 MG/DL (ref 74–109)
GLUCOSE BLD-MCNC: 59 MG/DL (ref 70–99)
GLUCOSE BLD-MCNC: 78 MG/DL (ref 70–99)
GLUCOSE BLD-MCNC: 96 MG/DL (ref 70–99)
GLUCOSE BLD-MCNC: 99 MG/DL (ref 70–99)
MAGNESIUM: 1.9 MG/DL (ref 1.6–2.6)
MAGNESIUM: 2.2 MG/DL (ref 1.6–2.6)
MAGNESIUM: 2.2 MG/DL (ref 1.6–2.6)
PERFORMED ON: ABNORMAL
PERFORMED ON: NORMAL
PHOSPHORUS: 2.6 MG/DL (ref 2.5–4.5)
PHOSPHORUS: 2.7 MG/DL (ref 2.5–4.5)
PHOSPHORUS: 2.7 MG/DL (ref 2.5–4.5)
POTASSIUM SERPL-SCNC: 3 MMOL/L (ref 3.5–5)
POTASSIUM SERPL-SCNC: 3.2 MMOL/L (ref 3.5–5)
POTASSIUM SERPL-SCNC: 4.6 MMOL/L (ref 3.5–5)
SODIUM BLD-SCNC: 114 MMOL/L (ref 136–145)
SODIUM BLD-SCNC: 126 MMOL/L (ref 136–145)
SODIUM BLD-SCNC: 127 MMOL/L (ref 136–145)

## 2022-04-25 PROCEDURE — 95720 EEG PHY/QHP EA INCR W/VEEG: CPT | Performed by: PSYCHIATRY & NEUROLOGY

## 2022-04-25 PROCEDURE — 36415 COLL VENOUS BLD VENIPUNCTURE: CPT

## 2022-04-25 PROCEDURE — 6360000002 HC RX W HCPCS: Performed by: INTERNAL MEDICINE

## 2022-04-25 PROCEDURE — 2580000003 HC RX 258: Performed by: INTERNAL MEDICINE

## 2022-04-25 PROCEDURE — 2100000000 HC CCU R&B

## 2022-04-25 PROCEDURE — 94003 VENT MGMT INPAT SUBQ DAY: CPT

## 2022-04-25 PROCEDURE — 93010 ELECTROCARDIOGRAM REPORT: CPT | Performed by: INTERNAL MEDICINE

## 2022-04-25 PROCEDURE — 84100 ASSAY OF PHOSPHORUS: CPT

## 2022-04-25 PROCEDURE — 6370000000 HC RX 637 (ALT 250 FOR IP): Performed by: INTERNAL MEDICINE

## 2022-04-25 PROCEDURE — 80048 BASIC METABOLIC PNL TOTAL CA: CPT

## 2022-04-25 PROCEDURE — 99233 SBSQ HOSP IP/OBS HIGH 50: CPT | Performed by: PSYCHIATRY & NEUROLOGY

## 2022-04-25 PROCEDURE — 2500000003 HC RX 250 WO HCPCS: Performed by: INTERNAL MEDICINE

## 2022-04-25 PROCEDURE — 82947 ASSAY GLUCOSE BLOOD QUANT: CPT

## 2022-04-25 PROCEDURE — 83735 ASSAY OF MAGNESIUM: CPT

## 2022-04-25 PROCEDURE — 95705 EEG W/O VID 2-12 HR UNMNTR: CPT

## 2022-04-25 PROCEDURE — 99223 1ST HOSP IP/OBS HIGH 75: CPT

## 2022-04-25 PROCEDURE — 2700000000 HC OXYGEN THERAPY PER DAY

## 2022-04-25 PROCEDURE — 8010000000 HC HEMODIALYSIS ACUTE INPT

## 2022-04-25 RX ORDER — INSULIN LISPRO 100 [IU]/ML
0-6 INJECTION, SOLUTION INTRAVENOUS; SUBCUTANEOUS NIGHTLY
Status: DISCONTINUED | OUTPATIENT
Start: 2022-04-25 | End: 2022-05-14

## 2022-04-25 RX ORDER — HYDRALAZINE HYDROCHLORIDE 20 MG/ML
10 INJECTION INTRAMUSCULAR; INTRAVENOUS ONCE
Status: COMPLETED | OUTPATIENT
Start: 2022-04-25 | End: 2022-04-25

## 2022-04-25 RX ORDER — INSULIN LISPRO 100 [IU]/ML
0-12 INJECTION, SOLUTION INTRAVENOUS; SUBCUTANEOUS
Status: DISCONTINUED | OUTPATIENT
Start: 2022-04-25 | End: 2022-05-14

## 2022-04-25 RX ORDER — HYDRALAZINE HYDROCHLORIDE 50 MG/1
100 TABLET, FILM COATED ORAL EVERY 8 HOURS SCHEDULED
Status: DISCONTINUED | OUTPATIENT
Start: 2022-04-26 | End: 2022-05-25 | Stop reason: HOSPADM

## 2022-04-25 RX ADMIN — INSULIN LISPRO 1 UNITS: 100 INJECTION, SOLUTION INTRAVENOUS; SUBCUTANEOUS at 21:52

## 2022-04-25 RX ADMIN — PROPOFOL 50 MCG/KG/MIN: 10 INJECTION, EMULSION INTRAVENOUS at 15:26

## 2022-04-25 RX ADMIN — ACETAMINOPHEN 650 MG: 325 TABLET ORAL at 00:35

## 2022-04-25 RX ADMIN — POTASSIUM CHLORIDE 10 MEQ: 7.45 INJECTION INTRAVENOUS at 04:12

## 2022-04-25 RX ADMIN — VANCOMYCIN HYDROCHLORIDE 125 MG: 1 INJECTION, POWDER, LYOPHILIZED, FOR SOLUTION INTRAVENOUS at 18:21

## 2022-04-25 RX ADMIN — DEXTROSE MONOHYDRATE 3 MG/HR: 50 INJECTION, SOLUTION INTRAVENOUS at 08:43

## 2022-04-25 RX ADMIN — LEVETIRACETAM 500 MG: 5 INJECTION INTRAVENOUS at 14:08

## 2022-04-25 RX ADMIN — CHLORHEXIDINE GLUCONATE 15 ML: 1.2 RINSE ORAL at 08:04

## 2022-04-25 RX ADMIN — SODIUM CHLORIDE, PRESERVATIVE FREE 20 MG: 5 INJECTION INTRAVENOUS at 08:03

## 2022-04-25 RX ADMIN — SODIUM CHLORIDE 21.3 UNITS/HR: 9 INJECTION, SOLUTION INTRAVENOUS at 02:22

## 2022-04-25 RX ADMIN — CHLORHEXIDINE GLUCONATE 15 ML: 1.2 RINSE ORAL at 21:53

## 2022-04-25 RX ADMIN — LEVETIRACETAM 500 MG: 5 INJECTION INTRAVENOUS at 00:32

## 2022-04-25 RX ADMIN — POTASSIUM CHLORIDE 10 MEQ: 7.45 INJECTION INTRAVENOUS at 05:15

## 2022-04-25 RX ADMIN — HEPARIN SODIUM 5000 UNITS: 5000 INJECTION INTRAVENOUS; SUBCUTANEOUS at 15:01

## 2022-04-25 RX ADMIN — POTASSIUM CHLORIDE 10 MEQ: 7.45 INJECTION INTRAVENOUS at 03:07

## 2022-04-25 RX ADMIN — DEXTROSE MONOHYDRATE 125 ML: 100 INJECTION, SOLUTION INTRAVENOUS at 09:04

## 2022-04-25 RX ADMIN — POTASSIUM CHLORIDE 10 MEQ: 7.45 INJECTION INTRAVENOUS at 07:29

## 2022-04-25 RX ADMIN — DEXTROSE MONOHYDRATE 7.5 MG/HR: 50 INJECTION, SOLUTION INTRAVENOUS at 12:04

## 2022-04-25 RX ADMIN — VANCOMYCIN HYDROCHLORIDE 125 MG: 1 INJECTION, POWDER, LYOPHILIZED, FOR SOLUTION INTRAVENOUS at 15:22

## 2022-04-25 RX ADMIN — POTASSIUM CHLORIDE 10 MEQ: 7.45 INJECTION INTRAVENOUS at 06:17

## 2022-04-25 RX ADMIN — POTASSIUM CHLORIDE 10 MEQ: 7.45 INJECTION INTRAVENOUS at 00:11

## 2022-04-25 RX ADMIN — DEXTROSE MONOHYDRATE 125 ML: 100 INJECTION, SOLUTION INTRAVENOUS at 07:39

## 2022-04-25 RX ADMIN — POTASSIUM CHLORIDE 10 MEQ: 7.45 INJECTION INTRAVENOUS at 02:08

## 2022-04-25 RX ADMIN — CHLORHEXIDINE GLUCONATE 15 ML: 1.2 RINSE ORAL at 00:28

## 2022-04-25 RX ADMIN — Medication 10 ML: at 08:03

## 2022-04-25 RX ADMIN — HEPARIN SODIUM 5000 UNITS: 5000 INJECTION INTRAVENOUS; SUBCUTANEOUS at 08:03

## 2022-04-25 RX ADMIN — POTASSIUM CHLORIDE 10 MEQ: 7.45 INJECTION INTRAVENOUS at 01:03

## 2022-04-25 RX ADMIN — PROPOFOL 50 MCG/KG/MIN: 10 INJECTION, EMULSION INTRAVENOUS at 09:14

## 2022-04-25 RX ADMIN — HEPARIN SODIUM 5000 UNITS: 5000 INJECTION INTRAVENOUS; SUBCUTANEOUS at 21:53

## 2022-04-25 RX ADMIN — POTASSIUM CHLORIDE 10 MEQ: 7.45 INJECTION INTRAVENOUS at 08:32

## 2022-04-25 RX ADMIN — HYDRALAZINE HYDROCHLORIDE 10 MG: 20 INJECTION, SOLUTION INTRAMUSCULAR; INTRAVENOUS at 20:52

## 2022-04-25 RX ADMIN — Medication: at 00:28

## 2022-04-25 RX ADMIN — PROPOFOL 45 MCG/KG/MIN: 10 INJECTION, EMULSION INTRAVENOUS at 04:52

## 2022-04-25 RX ADMIN — PROPOFOL 50 MCG/KG/MIN: 10 INJECTION, EMULSION INTRAVENOUS at 19:41

## 2022-04-25 RX ADMIN — Medication 10 ML: at 21:53

## 2022-04-25 ASSESSMENT — PULMONARY FUNCTION TESTS
PIF_VALUE: 20
PIF_VALUE: 18
PIF_VALUE: 22
PIF_VALUE: 20
PIF_VALUE: 21
PIF_VALUE: 35
PIF_VALUE: 19
PIF_VALUE: 23
PIF_VALUE: 21
PIF_VALUE: 19
PIF_VALUE: 20
PIF_VALUE: 21
PIF_VALUE: 22
PIF_VALUE: 21
PIF_VALUE: 19
PIF_VALUE: 19
PIF_VALUE: 21
PIF_VALUE: 19
PIF_VALUE: 20
PIF_VALUE: 20
PIF_VALUE: 19
PIF_VALUE: 22
PIF_VALUE: 21
PIF_VALUE: 22
PIF_VALUE: 20
PIF_VALUE: 20
PIF_VALUE: 19
PIF_VALUE: 23
PIF_VALUE: 22
PIF_VALUE: 20
PIF_VALUE: 44
PIF_VALUE: 19
PIF_VALUE: 19
PIF_VALUE: 22
PIF_VALUE: 18

## 2022-04-25 ASSESSMENT — PAIN SCALES - GENERAL
PAINLEVEL_OUTOF10: 0

## 2022-04-25 NOTE — PROGRESS NOTES
Hospitalist Progress Note    Patient:  Joaquin Arellano  YOB: 1970  Date of Service: 4/25/2022  MRN: [id number]   Acct: [de-identified]   Primary Care Physician: ROMINA Rosario  Advance Directive: Full Code  Admit Date: 4/24/2022       Hospital Day: 1  Referring Provider: West Bell DO    Patient Seen, Chart, Consults, Notes, Labs, Radiology studies reviewed. Subjective:  Joaquin Arellano is a 47 y.o. female  whom we are following for end-stage renal disease, nonketotic hyperosmolar hyperglycemia, hypoxemic respiratory failure, seizures. No events overnight. She remains on the ventilator. She is undergoing continual EEG monitoring. Her serum sodium was 114 this morning. She will receive hemodialysis for ultrafiltration.     Allergies:  Penicillins, Lamisil advanced [terbinafine], Reglan [metoclopramide], and Stadol [butorphanol]    Medicines:  Current Facility-Administered Medications   Medication Dose Route Frequency Provider Last Rate Last Admin    sodium phosphate 10.5 mmol in sodium chloride 0.9 % 250 mL IVPB  0.16 mmol/kg IntraVENous PRN Patience Walter, DO        Or    sodium phosphate 21 mmol in sodium chloride 0.9 % 250 mL IVPB  0.32 mmol/kg IntraVENous PRN Patience Walter, DO        insulin lispro (HUMALOG) injection vial 0-12 Units  0-12 Units SubCUTAneous TID WC [place] Curb, DO        insulin lispro (HUMALOG) injection vial 0-6 Units  0-6 Units SubCUTAneous Nightly [place] Curb, DO        vancomycin [facility]) oral solution 125 mg  125 mg Oral 4 times per day [place] Curb, DO   125 mg at 04/25/22 1522    propofol injection  5-50 mcg/kg/min IntraVENous Once [place] Curb, DO 11.2 mL/hr at 04/24/22 1505 25 mcg/kg/min at 04/24/22 1505    glucagon (rDNA) injection 1 mg  1 mg IntraMUSCular PRN [place] Curb, DO        dextrose 5 % solution  100 mL/hr IntraVENous PRN [place] Curb, DO        glucose chewable tablet 16 g  4 tablet Oral PRN sulfate 1000 mg in dextrose 5% 100 mL IVPB  1,000 mg IntraVENous PRN Patience Walter, DO        potassium bicarb-citric acid (EFFER-K) effervescent tablet 40 mEq  40 mEq Oral PRN Patience Walter, DO        Or    potassium chloride 10 mEq/100 mL IVPB (Peripheral Line)  10 mEq IntraVENous PRN Clyda Frieda, DO   Stopped at 04/25/22 1613       Past Medical History:  Past Medical History:   Diagnosis Date    Arthritis     Chronic kidney disease, stage 5, kidney failure (HCC)     Closed fracture of right shoulder girdle, with routine healing, subsequent encounter     DM (diabetes mellitus) type II controlled with renal manifestation ([address] 06/1993    Gastroparesis     GERD (gastroesophageal reflux disease)     Hemodialysis patient ([address]     dialysis on tues, thur, and sat at [facility]    Hypertension     Hypothyroid     Nasal congestion     recent    Neuropathy     Noncompliance with medications     Palliative care patient 10/08/2019    Restless leg syndrome        Past Surgical History:  Past Surgical History:   Procedure Laterality Date    BREAST SURGERY Left 2008    infected milk gland removed    BREAST SURGERY Right 5/25/2021    WEDGE EXCISION RIGHT BREAST DUCT WITH LACRIMAL DUCT PROBE, PEC BLOCK performed by Pedrito Ferris MD at [address] [facility], [facility]COP[facility]      2008    COLONOSCOPY Left 9/17/2020    Dr Derick Leija hepatic flexure-Severe tortuosity with severe spasming, 1 yr recall    DIALYSIS FISTULA CREATION Left 1/25/2019    REVISION LEFT UPPER EXTREMITY AV ACCESS WITH LEFT BRACHIAL ARTERY TO LEFT AXILLARY VEIN WITH  INTERPOSITIONAL ARTEGRAFT   performed by Judit Tinoco MD at [address]  2012    [address] Right 1/25/2019    INSERTION OF RIGHT INTERNAL JUGULAR HEMODIALYSIS CATHETER performed by Judit Tinoco MD at [address] [facility]  08/17/2018    1.  Moderately increased stainable iron identified by special stain in Kupffer cells and occasional hepatocytes. Negative for evidence of significant portal or lobular inflammation. Negative for evidence of significant fibrosis    MA COLONOSCOPY W/BIOPSY SINGLE/MULTIPLE N/A 10/20/2017    Dr Eric García prep-Tubular AP (-) dysplasia x 2--3 yr recall    MA EGD TRANSORAL BIOPSY SINGLE/MULTIPLE N/A 2016    Dr Carmenza Romero EGD TRANSORAL BIOPSY SINGLE/MULTIPLE N/A 10/20/2017    Dr Joseph Rivera (-)   [address] [facility] VASCULAR SURGERY Left 2016    fistula by Dr Chester Mg at [address]  10/02/2017    [facility]. Left upper fistulograms/venograms, balloon angioplasty cephalic vein arch [id number] conquest.    VASCULAR SURGERY  2018    FISTULOGRAM    VASCULAR SURGERY  10/29/2018    [facility]. Left upper fistulograms/venograms    VASCULAR SURGERY  2018    [facility]. Arch aortogram,left upper arteriograms.  VASCULAR SURGERY  2019    [facility]. Removal of tunneled dialyis catheter right internal jugular vein.  VASCULAR SURGERY  2019    [facility]. Left upper extremity fistulogram including venography to the superior vena cava. Balloon angioplasty left subclavian vein with 10mm x 40mm conquest balloon. Balloon angioplasty mid/proximal upper arm cephalic vein with 39ZX x 40mm conquest balloon. Venograms after balloon angioplasty. Stent left mid/proximal upper arm cephalic vein stenosis fluency 10mm x 60mm self-expanding covered stent. Balloon     VASCULAR SURGERY  2019    cont angioplasty stent with 10mm x 40mm conquest balloon. Completion venograms left upper extremity.        Family History  Family History   Problem Relation Age of Onset    Colon Cancer Mother          at 63    Colon Polyps Mother     Lung Cancer Father          at 79    Colon Polyps Father     Stomach Cancer Sister     Breast Cancer Sister 27    Depression Daughter 25        committed suicide    Liver Cancer Neg Hx     Liver Disease Neg Hx     Esophageal Cancer Neg Hx     Rectal Cancer Neg Hx Social History  Social History     Socioeconomic History    Marital status: Single     Spouse name: Not on file    Number of children: 2    Years of education: Not on file    Highest education level: Not on file   Occupational History    Not on file   Tobacco Use    Smoking status: Current Every Day Smoker     Packs/day: 0.50     Years: 33.00     Pack years: 16.50     Types: Cigarettes    Smokeless tobacco: Never Used    Tobacco comment: NOW at 1/4 PD, started at age 24 and has averaged 2 PPD   Vaping Use    Vaping Use: Never used   Substance and Sexual Activity    Alcohol use: No    Drug use: Not Currently     Types: Marijuana Curlie Opal)    Sexual activity: Not Currently     Partners: Male   Other Topics Concern    Not on file   Social History Narrative    Not on file     Social Determinants of Health     Financial Resource Strain:     Difficulty of Paying Living Expenses: Not on file   Food Insecurity:     Worried About Running Out of Food in the Last Year: Not on file    Kendy of Food in the Last Year: Not on file   Transportation Needs:     Lack of Transportation (Medical): Not on file    Lack of Transportation (Non-Medical):  Not on file   Physical Activity:     Days of Exercise per Week: Not on file    Minutes of Exercise per Session: Not on file   Stress:     Feeling of Stress : Not on file   Social Connections:     Frequency of Communication with Friends and Family: Not on file    Frequency of Social Gatherings with Friends and Family: Not on file    Attends [employer] Services: Not on file    Active Member of Clubs or Organizations: Not on file    Attends Club or Organization Meetings: Not on file    Marital Status: Not on file   Intimate Partner Violence:     Fear of Current or Ex-Partner: Not on file    Emotionally Abused: Not on file    Physically Abused: Not on file    Sexually Abused: Not on file   Housing Stability:     Unable to Pay for Housing in the Last Year: Not on file    Number of Places Lived in the Last Year: Not on file    Unstable Housing in the Last Year: Not on file         Review of Systems:    Review of Systems   Unable to perform ROS: Intubated           Objective:  Blood pressure (!) 149/74, pulse 89, temperature 98.3 °F (36.8 °C), temperature source Axillary, resp. rate 17, weight 144 lb 11.2 oz (65.6 kg), SpO2 100 %. Intake/Output Summary (Last 24 hours) at 4/25/2022 1638  Last data filed at 4/25/2022 1623  Gross per 24 hour   Intake 3603.38 ml   Output 265 ml   Net 3338.38 ml       Physical Exam  Vitals and nursing note reviewed. Constitutional:       Appearance: She is ill-appearing. Interventions: She is sedated and intubated. HENT:      Head: Normocephalic and atraumatic. Right Ear: External ear normal.      Left Ear: External ear normal.      Nose: Nose normal.      Mouth/Throat:      Mouth: Mucous membranes are moist.   Eyes:      Conjunctiva/sclera: Conjunctivae normal.   Cardiovascular:      Rate and Rhythm: Normal rate and regular rhythm. Heart sounds: Normal heart sounds. Pulmonary:      Effort: Pulmonary effort is normal. She is intubated. Breath sounds: Normal breath sounds. Abdominal:      General: Abdomen is flat. Palpations: Abdomen is soft. Musculoskeletal:      Cervical back: Neck supple. No rigidity. No muscular tenderness. Right lower leg: Edema present. Left lower leg: Edema present. Skin:     General: Skin is warm and dry.          Labs:  BMP:   Recent Labs     04/25/22  0119 04/25/22  0451 04/25/22  1041   * 127* 114*   K 3.0* 3.2* 4.6   CL 84* 87* 78*   CO2 23 23 20*   PHOS 2.7 2.6 2.7   BUN 65* 65* 59*   CREATININE 5.5* 5.7* 5.1*   CALCIUM 8.7 9.0 7.8*     CBC:   Recent Labs     04/24/22  1228   WBC 8.5   HGB 11.3*   HCT 36.4*   MCV 92.6        LIVER PROFILE:   Recent Labs     04/24/22  1228   AST 19   ALT 15   BILITOT 0.7   ALKPHOS 134*     PT/INR: No results for input(s): PROTIME, INR in the last 72 hours. APTT: No results for input(s): APTT in the last 72 hours. BNP:  No results for input(s): BNP in the last 72 hours. Ionized Calcium:No results for input(s): IONCA in the last 72 hours. Magnesium:  Recent Labs     04/25/22  0119 04/25/22  0451 04/25/22  1041   MG 2.2 2.2 1.9     Phosphorus:  Recent Labs     04/25/22  0119 04/25/22  0451 04/25/22  1041   PHOS 2.7 2.6 2.7     HgbA1C:   Recent Labs     04/24/22  1228   LABA1C 9.7*     Hepatic:   Recent Labs     04/24/22  1228   ALKPHOS 134*   ALT 15   AST 19   PROT 6.9   BILITOT 0.7   LABALBU 4.0     Lactic Acid: No results for input(s): LACTA in the last 72 hours. Troponin: No results for input(s): CKTOTAL, CKMB, TROPONINT in the last 72 hours. ABGs: No results for input(s): PH, PCO2, PO2, HCO3, O2SAT in the last 72 hours. CRP:  No results for input(s): CRP in the last 72 hours. Sed Rate:  No results for input(s): SEDRATE in the last 72 hours. Cultures:   No results for input(s): CULTURE in the last 72 hours. Recent Labs     04/24/22  1426 04/24/22  1447   BC  --  No Growth to date. Any change in status will be called. BLOODCULT2 No Growth to date. Any change in status will be called. --      No results for input(s): CXSURG in the last 72 hours. Radiology reports as per the Radiologist  Radiology: CT Head WO Contrast    Result Date: 4/24/2022  CT HEAD WO CONTRAST 4/24/2022 2:01 PM HISTORY: Altered mental status COMPARISON: CT scan dated 11/18/2021 DOSE LENGTH PRODUCT: 766 mGy cm TECHNIQUE: Helical tomographic images of the brain were obtained without the use of intravenous contrast. Automated exposure control was also utilized to decrease patient radiation dose. FINDINGS: There is no evidence of evolving large vascular territory infarct. No visualized intra-axial or extra-axial hemorrhage. No mass lesion is identified. Normal size and configuration of the ventricular system. The basal cisterns are symmetric. Posterior fossa structures are unremarkable. The included orbits and their contents are unremarkable. Chronic paranasal sinus disease on the left. The visualized osseous structures and overlying soft tissues of the skull and face are unremarkable. 1. No acute intracranial process. Signed by Dr Jonelle Torres    Result Date: 4/24/2022  XR CHEST PORTABLE 4/24/2022 4:27 PM HISTORY: ET tube, enteric tube  Technique: Single AP view of the chest COMPARISONS: 4/24/2022 FINDINGS: Endotracheal tube is identified with tip essentially touching the krysta. Enteric tube courses into the stomach with tip curled in the fundus back towards the gastroesophageal junction. The lungs are clear and well expanded. Heart size is stable. Pulmonary vasculature are nondilated. No pleural effusion or pneumothorax. 1. Endotracheal tube is deep, tip essentially touching the krysta. Lungs are clear and well expanded. I would recommend 2-3 cm withdrawal for a more optimal positioning. The results of this exam were discussed with Larry Charlton (ICU nursing) on 4/24/2022 at 1640 hours Signed by Dr Brooklyn Velasquez    XR CHEST PORTABLE    Result Date: 4/24/2022  XR CHEST PORTABLE 4/24/2022 1:17 PM HISTORY: ET tube placement  Technique: Single AP view of the chest COMPARISONS: 4/24/2022 FINDINGS: Status post endotracheal intubation. ET tube is in good position. Lungs are well-expanded. Enteric tube curls on itself in the midesophagus and then extends back up into the throat. Heart size is stable. Pulmonary vasculature are nondilated. 1. ET tube is in good position. Lungs are clear and well expanded. 2. Enteric tube curls on itself in the mid esophagus before extending back up into the throat. This will need repositioned.  The results of this exam were discussed with Dr. Lawanda Paiz on 4/24/2022 at 1329 hours Signed by Dr Brooklyn Velasquez    XR CHEST PORTABLE    Result Date: 4/24/2022  XR CHEST PORTABLE 4/24/2022 12:34 PM HISTORY: Altered mental status  Technique: Single AP view of the chest COMPARISONS: Chest exam dated 4/1/2022 FINDINGS: No lung consolidation. No pleural effusion or pneumothorax. Cardiomediastinal silhouette and pulmonary vasculature are unremarkable. No acute bony abnormality. Left subclavian region vascular stent with additional stent projecting over the left humerus. No acute cardiopulmonary process. Signed by Dr Amanda Harrell. Continue Keppra. Neurology following.     Acute hypoxemic respiratory failure. Continue ventilatory support. Wean when feasible.     Nonketotic hyperosmolar hyperglycemia. Transitioned to subcutaneous insulin     End-stage renal disease. Dialysis today for additional ultrafiltration.     Please document 45 minutes of critical care time for patient assessment, chart review, discussion with staff, .       Skinny Dear, DO

## 2022-04-25 NOTE — CONSULTS
Palliative Care Consult Note    4/25/2022 8:26 AM  Subjective:  Admit Date: 4/24/2022  PCP: ROMINA Aponte    Date of Service: 4/25/2022    Reason for Consultation:  Goals of Care, Code Status, Family Support     History Obtained From: EMR/Patient and their Family    History Of Present Illness: The patient is a 47 y.o. female with PMH ESRD on hemodialysis, DM, HTN, and multiple other comorbidities who presented to [address] [place] [address] ED 4/24/22 via EMS following episode of unresponsiveness at home. She has known kidney disease and appears noncompliant with dialysis and medical treatments. She presented with severe hyperglycemia with BS >700. While in the ED she had a witnessed seizure and required intubation for airway protection. Further workup in ED revealed hyponatremia, probnp 35,000, creat/BUN 5.7/65, and positive UA. She was also found to be cdiff and norovirus positive on GI panel. CT head was negative for acute intracranial processes. She was loaded on Keppra, initiated on insulin infusion and empiric abx, and admitted to ICU under hospitalist services. She remains intubated and sedated in critical condition. Palliative care is consulted for goals of care discussions.      Past Medical History:        Diagnosis Date    Arthritis     Chronic kidney disease, stage 5, kidney failure (HCC)     Closed fracture of right shoulder girdle, with routine healing, subsequent encounter     DM (diabetes mellitus) type II controlled with renal manifestation ([facility] 06/1993    Gastroparesis     GERD (gastroesophageal reflux disease)     Hemodialysis patient ([facility]     dialysis on tues, thur, and sat at [facility]    Hypertension     Hypothyroid     Nasal congestion     recent    Neuropathy     Noncompliance with medications     Palliative care patient 10/08/2019    Restless leg syndrome        Past Surgical History:        Procedure Laterality Date    BREAST SURGERY Left 2008    infected milk gland removed    BREAST SURGERY Right 5/25/2021    WEDGE EXCISION RIGHT BREAST DUCT WITH LACRIMAL DUCT PROBE, PEC BLOCK performed by Ludmila Dooley MD at [address], LAPAROSCOPIC      2008    COLONOSCOPY Left 9/17/2020    Dr Mann Nguyen hepatic flexure-Severe tortuosity with severe spasming, 1 yr recall    DIALYSIS FISTULA CREATION Left 1/25/2019    REVISION LEFT UPPER EXTREMITY AV ACCESS WITH LEFT BRACHIAL ARTERY TO LEFT AXILLARY VEIN WITH  INTERPOSITIONAL ARTEGRAFT   performed by Zac Zaragoza MD at [address]  2012    [address] Right 1/25/2019    INSERTION OF RIGHT INTERNAL JUGULAR HEMODIALYSIS CATHETER performed by Zac Zaragoza MD at [address] [facility]  08/17/2018    1.  Moderately increased stainable iron identified by special stain in Kupffer cells and occasional hepatocytes. Negative for evidence of significant portal or lobular inflammation. Negative for evidence of significant fibrosis    [place] COLONOSCOPY W/BIOPSY SINGLE/MULTIPLE N/A 10/20/2017    Dr Rolla Dakin prep-Tubular AP (-) dysplasia x 2--3 yr recall    [place] EGD TRANSORAL BIOPSY SINGLE/MULTIPLE N/A 12/27/2016    Dr Mg Keep EGD TRANSORAL BIOPSY SINGLE/MULTIPLE N/A 10/20/2017    Dr Trudy Luther (-)   [address] [facility] VASCULAR SURGERY Left 2016    fistula by Dr Carloz Matthew at [address]  10/02/2017    [facility]. Left upper fistulograms/venograms, balloon angioplasty cephalic vein arch 09T75 conquest.    VASCULAR SURGERY  08/2018    FISTULOGRAM    VASCULAR SURGERY  10/29/2018    [facility]. Left upper fistulograms/venograms    VASCULAR SURGERY  12/19/2018    [facility]. Arch aortogram,left upper arteriograms.  VASCULAR SURGERY  03/06/2019    [facility]. Removal of tunneled dialyis catheter right internal jugular vein.  VASCULAR SURGERY  08/28/2019    [facility]. Left upper extremity fistulogram including venography to the superior vena cava. Balloon angioplasty left subclavian vein with 10mm x 40mm conquest balloon. Balloon angioplasty mid/proximal upper arm cephalic vein with 13OF x 40mm conquest balloon. Venograms after balloon angioplasty. Stent left mid/proximal upper arm cephalic vein stenosis fluency 10mm x 60mm self-expanding covered stent. Balloon     VASCULAR SURGERY  08/28/2019    cont angioplasty stent with 10mm x 40mm conquest balloon. Completion venograms left upper extremity. Home Medications:  Prior to Admission medications    Medication Sig Start Date End Date Taking?  Authorizing Provider   DULoxetine (CYMBALTA) 60 MG extended release capsule Take 1 capsule by mouth daily Hold Cymbalta while taking Zyvox Antibiotic 2/3/22   ROMINA Nelson CNP   insulin glargine (BASAGLAR KWIKPEN) 100 UNIT/ML injection pen Inject 5 Units into the skin nightly Indications: Diabetes 1/12/22   Mauricio Allison MD   insulin aspart (NOVOLOG FLEXPEN) 100 UNIT/ML injection pen Inject 4 Units into the skin 3 times daily (before meals) Indications: Diabetes 1/12/22   Mauricio Allison MD   lactulose (CHRONULAC) 10 GM/15ML solution Take 15 g by mouth 3 times daily With meals for constipation, hold for loose stools    Historical Provider, MD   cloNIDine (CATAPRES) 0.1 MG/24HR PTWK Place 1 patch onto the skin once a week 12/28/21   ROMINA Snyder CNP   nicotine (NICODERM CQ) 14 MG/24HR Place 1 patch onto the skin daily 12/23/21   ROMINA Snyder CNP   isosorbide mononitrate (IMDUR) 120 MG extended release tablet Take 1 tablet by mouth daily 11/26/21   ROMINA Snyder CNP   hydrALAZINE (APRESOLINE) 100 MG tablet Take 1 tablet by mouth every 8 hours 11/25/21   ROMINA Snyder CNP   metoprolol succinate (TOPROL XL) 100 MG extended release tablet Take 1 tablet by mouth every morning (before breakfast)  Patient taking differently: Take 150 mg by mouth every morning (before breakfast)  11/26/21   ROMINA Snyder CNP   NIFEdipine (PROCARDIA XL) 30 MG extended release tablet Take 3 tablets by mouth daily 21   Tracee Regalado, APRN - CNP   Ergocalciferol (VITAMIN D2) 10 MCG (400 UNIT) TABS Take 1.25 mg by mouth every 14 days    Historical Provider, MD   senna (SENNA-LAX) 8.6 MG tablet Take 1 tablet by mouth daily    Historical Provider, MD   sevelamer (RENVELA) 800 MG tablet Take 1 tablet by mouth 3 times daily (with meals)    Historical Provider, MD   albuterol sulfate  (90 Base) MCG/ACT inhaler Inhale 2 puffs into the lungs every 4 hours as needed    Historical Provider, MD   levothyroxine (SYNTHROID) 150 MCG tablet Take 150 mcg by mouth Daily    Historical Provider, MD   rOPINIRole (REQUIP) 2 MG tablet Take 4 mg by mouth nightly Indications: Restless Leg Syndrome     Historical Provider, MD   simvastatin (ZOCOR) 40 MG tablet Take 40 mg by mouth nightly     Historical Provider, MD       Allergies:    Penicillins, Lamisil advanced [terbinafine], Reglan [metoclopramide], and Stadol [butorphanol]    Social History:    The patient currently lives at home with [facility] other  Tobacco:   reports that she has been smoking cigarettes. She has a 16.50 pack-year smoking history. She has never used smokeless tobacco.  Alcohol:   reports no history of alcohol use.   Illicit Drugs: unknown    Family History:      Problem Relation Age of Onset    Colon Cancer Mother          at 63    Colon Polyps Mother     Lung Cancer Father          at 79    Colon Polyps Father     Stomach Cancer Sister     Breast Cancer Sister 27    Depression Daughter 25        committed suicide    Liver Cancer Neg Hx     Liver Disease Neg Hx     Esophageal Cancer Neg Hx     Rectal Cancer Neg Hx        Review of Systems:   Unable to assess 2/2 intubated/sedated    Physical Examination:  BP (!) 168/74   Pulse 87   Temp 98.2 °F (36.8 °C) (Axillary)   Resp 16   Wt 144 lb 11.2 oz (65.6 kg)   SpO2 100%   BMI 23.36 kg/m²      General appearance: 48 yo female, chronically ill appearing, intubated/sedated  Head: Normocephalic, without obvious abnormality, atraumatic  Eyes: conjunctivae/corneas clear. PERRL  Ears: normal external ears and nose  Neck: no JVD, supple, symmetrical, trachea midline   Lungs: diminished to auscultation bilaterally,no rales or wheezes   Heart: regular rate and rhythm, S1, S2 normal, no murmur  Abdomen:soft, non-tender; non-distended, normal bowel sounds   Extremities: No lower extremity edema,  No erythema, no tenderness to palpation, pedal pulses present  Skin: Pale, warm  Neurologic: Intubated and sedated. Unresponsive to voice, not following commands. Withdrawals to pain. Generalized weakness, normal tone. Diagnostic Data:  CBC:  Recent Labs     04/24/22  1228   WBC 8.5   HGB 11.3*   HCT 36.4*        BMP:  Recent Labs     04/24/22  2119 04/25/22  0119 04/25/22  0451   * 126* 127*   K 3.0* 3.0* 3.2*   CL 84* 84* 87*   CO2 21* 23 23   BUN 65* 65* 65*   CREATININE 5.3* 5.5* 5.7*   CALCIUM 8.7 8.7 9.0   PHOS 3.7 2.7 2.6     Recent Labs     04/24/22  1228   AST 19   ALT 15   BILITOT 0.7   ALKPHOS 134*     Coag Panel: No results for input(s): INR, PROTIME, APTT in the last 72 hours. Cardiac Enzymes:   Recent Labs     04/24/22  1228   TROPONINI 0.18*     ABGs:  Lab Results   Component Value Date    PHART 7.330 04/24/2022    PO2ART 389.0 04/24/2022    OKF9GXJ 41.0 04/24/2022     Urinalysis:  Lab Results   Component Value Date    NITRU Negative 04/24/2022    WBCUA TNTC 04/24/2022    BACTERIA Rare 04/24/2022    RBCUA 6-10 04/24/2022    BLOODU MODERATE 04/24/2022    SPECGRAV 1.018 04/24/2022    GLUCOSEU =>1000 04/24/2022     A1C:   Recent Labs     04/24/22  1228   LABA1C 9.7*     ABG:  Recent Labs     04/24/22  1342   PHART 7.330*   CAK1WLZ 41.0   PO2ART 389.0*   VWC3ELZ 21.6*   BEART -4.1*   HGBAE 10.8*   D7YXNSHK 97.1   CARBOXHGBART 1.6       CT Head WO Contrast  Result Date: 4/24/2022  1. No acute intracranial process.  Signed by Dr Viki Light PORTABLE  Result Date: 4/24/2022  1. Endotracheal tube is deep, tip essentially touching the krysta. Lungs are clear and well expanded. I would recommend 2-3 cm withdrawal for a more optimal positioning. The results of this exam were discussed with Joe (ICU nursing) on 4/24/2022 at 1640 hours Signed by Dr Zuri Michael    XR CHEST PORTABLE  Result Date: 4/24/2022  1. ET tube is in good position. Lungs are clear and well expanded. 2. Enteric tube curls on itself in the mid esophagus before extending back up into the throat. This will need repositioned. The results of this exam were discussed with Dr. Chetna Contreras on 4/24/2022 at 1329 hours Signed by Dr Zuri Michael    XR CHEST PORTABLE  Result Date: 4/24/2022  No acute cardiopulmonary process. Signed by Dr Zuri Michael      Palliative Performance Scale:  [x] 10% Bed Bound  Extensive disease  Total care  Mouth care only  Drowsy/coma    Palliative Review of Advance Directives:     Surrogate Decision Maker: Maeve Graves, significant other    Durable Power of : No    Advanced Directives/Living Sarkar: Yes, copy on file    Out of hospital medical orders in place to reflect resuscitation status (MOLST/POLST): No    Information Sharing:  Patient's awareness of illness:  [] Terminal [] Life-Threatening [] Serious [] Non life-threatening [] Not serious   [x] Not discussed    Family awareness of illness:   [] Terminal [x] Life-Threatening [] Serious [] Nonlife-threatening [] Not serious   [] Not discussed    Assessment/Plan:  Principal Problem:    Seizure [facility])  Active Problems:    Palliative care patient  Resolved Problems:    * No resolved hospital problems. *       Visit Summary:  Chart reviewed, patient discussed with nursing staff. Reviewed health issues, work up and treatment plan as well as factors that lead to hospitalization. Ms. Janet Phillips is seen at bedside this morning with RN. She is on continuous EEG and remains intubated/sedated.  BS is controlled but still requiring cardene infusion for hypertension. I had difficulty reaching pts significant other, Malou Ogden, who is listed as pts decision maker on her living will. I was able to speak with pts sister, Gurpreet Gracia, who endorses pts quality of life has been poor. She tells me Malou Ogden can be difficult to get in contact with during the day. She does not feel pt should remain a full code at this point but understands the decision is Shayan's to make. I attempted to call Malou Ogden again this afternoon and he did answer. I introduced myself, explained the role of palliative care, and reason for consult. We discussed pts current status and plan of care. He confirms he is pts decision maker and her code status is to remain full code at this time. He states that pt has told him \"as long as she has a brain wave she wants everything done. \" Education provided on ACP and likelihood of pts poor performance if she required resuscitation. He verbalized understanding and will consider this. Opportunity for questions and emotional support provided. Will follow. Recommendations:     1. Palliative Care- GOC continue all medical treatments and monitor for improvement. D/c planning based on hospital course. Code status- FULL CODE. 2. Acute hypoxemic respiratory failure- mgmt per hospitalist. Vent support. Wean as tolerated  3. Hyperglycemia- improved. Insulin gtt on hold. 4. S/p Tonic-clonic Seizure- neuro following. Keppra continued. cEEG monitoring. MRI brain when able  5. UTI- IV abx continue. Follow cultures. 6. ESRD on maintenance dialysis- nephrology following. Plans for dialysis treatment tomorrow. Monitor labs  7. HTN- cardene infusion  8. Hyponatremia- mgmt per hospitalist. Labs noted. Thank you for consulting palliative care and allowing us to participate in the care of the patient.     CounselingTopics: Goals of care, Code Status, Disease process education, pt/family support    Time Spent Counseling > 50%:  YES Total Time Spent with patient/family counseling, workup/treatment review, counseling and placement of orders/preparation of this note: 77 minutes    Electronically signed by ROMINA Orona CNP on 4/25/2022 at 8:26 AM    (Please note that portions of this note were completed with a voice recognition program.  Efforts were made to edit the dictations but occasionally words are mis-transcribed.)

## 2022-04-25 NOTE — PROGRESS NOTES
Pt glucose still too high to register on bedside glucometer. Stat lab glucose checks ordered hourly until within measurable limits.   Electronically signed by Socorro Giron RN on 4/24/2022 at 8:12 PM

## 2022-04-25 NOTE — PROCEDURES
DAILY ADULT INPATIENT VIDEO-EEG EVENT MONITORING REPORT    Patient:   Danitza Helton  MR#:    [id number]  Room #:    INPATIENT   YOB: 1970  Study Date:    April 24, 2022  Primary Physician:     ROMINA Strong   Referring Physician:   Darlin Glass DO      CLINICAL INFORMATION:     This patient is a 47 y.o. female with a history of seizures. The specific reason we are doing this study is for event clarification and to evaluate for an underlying seizure focus and to exclude subclinical status epilepticus. MEDICATIONS:     See MAR    RECORDING CONDITIONS:     Continuous video-EEG monitoring was performed with [employer] equiptment. The recorded period occurred on April 24, 2022. Electrodes were applied according to the International 10-20 System. Data were obtained, stored, and interpreted according to ACNS guidelines (J Clin Neurophysiol 2006;23(2):) utilizing referential montage recording, with reformatting to longitudinal, transverse bipolar, and referential montages as necessary for interpretation, along with digital/automated EEG analysis. Physician access and review of the EEG and Video data occurred actively during the recording. RECORDING DURATION:   3 hours, 29 min. DESCRIPTION OF BASELINE RECORD:    The background is somewhat asymmetric with low voltage slowing seen over the left hemisphere. Intermixed burst suppression is noted throughout the recording which is likely due to underlying propofol use. No overt electrographic seizure activity was noted. No overt epileptiform abnormalities were seen. Muscle, motion, and eye movement artifacts were occasionally noted. DESCRIPTION OF EVENTS:    April 24, 2022  The Interictal files were as described in the baseline recording. Digital spike and event analysis reviewed and showed background asymmetry with low voltage slowing seen over the left hemisphere. Intermixed burst pressure was noted throughout the recording.   No seizures were recorded. E.E.G. INTERPRETATION:    Abnormal EEG due to background asymmetry with lower voltage slowing seen over the left hemisphere. Intermixed burst suppression was noted throughout the recording which is likely secondary to propofol. No overt electrographic seizure activity was seen. CLINICAL CORRELATION:    Continue cEEG monitoring.          Chayito Rueda DO  Board Certified Neurologist

## 2022-04-25 NOTE — PROGRESS NOTES
Nephrology ([facility]) Progress Note    Patient:  Reed Cueto  YOB: 1970  Date of Service: 4/25/2022  MRN: [id number]   Acct: [de-identified]   Primary Care Physician: ROMINA Pantoja  Advance Directive: Full Code  Admit Date: 4/24/2022       Hospital Day: 1  Referring Provider: Xuan Aguilar DO    Patient independently seen and examined, Chart, Consults, Notes, Operative notes, Labs, Cardiology, and Radiology studies reviewed as available. Chief complaint: Altered mental status. Subjective:  Reed Cueto is a 47 y.o. female for whom we were consulted for evaluation and treatment of end-stage renal disease. Patient also has history of seizure disorder, type 2 diabetes, hypertension, frequent hospitalization with noncompliance and poorly controlled diabetes. Patient was brought to the emergency room after she was found to have unresponsiveness and witnessed grand mal seizure. She was brought in by ambulance. On arrival in the emergency room she was found to be severely hyperglycemic and had a urinary tract infection. Patient was intubated, sedated and currently being treated by neurology for grand mal seizure. She is also receiving IV antibiotics and insulin drip. Her blood sugar control has significantly improved. This morning she was seen in the ICU, currently off insulin drip as her blood sugar has significantly improved.     Allergies:  Penicillins, Lamisil advanced [terbinafine], Reglan [metoclopramide], and Stadol [butorphanol]    Medicines:  Current Facility-Administered Medications   Medication Dose Route Frequency Provider Last Rate Last Admin    sodium phosphate 10.5 mmol in sodium chloride 0.9 % 250 mL IVPB  0.16 mmol/kg IntraVENous PRN Patience Walter, DO        Or    sodium phosphate 21 mmol in sodium chloride 0.9 % 250 mL IVPB  0.32 mmol/kg IntraVENous PRN Patience Walter, DO        propofol injection  5-50 mcg/kg/min IntraVENous Once Marisela Heman, DO 11.2 mL/hr at 04/24/22 1505 25 mcg/kg/min at 04/24/22 1505    insulin regular (HUMULIN R;NOVOLIN R) 100 Units in sodium chloride 0.9 % 100 mL infusion  0.5 Units/hr IntraVENous Continuous Marisela Heman, DO   Stopped at 04/25/22 1229    glucagon (rDNA) injection 1 mg  1 mg IntraMUSCular PRN Marisela Heman, DO        dextrose 5 % solution  100 mL/hr IntraVENous PRN Marisela Heman, DO        glucose chewable tablet 16 g  4 tablet Oral PRN Marisela Heman, DO        dextrose bolus (hypoglycemia) 10% 125 mL  125 mL IntraVENous PRN Marisela Heman, DO   Stopped at 04/25/22 4589    Or    dextrose bolus (hypoglycemia) 10% 250 mL  250 mL IntraVENous PRN Marisela Heman, DO        sodium chloride flush 0.9 % injection 5-40 mL  5-40 mL IntraVENous 2 times per day Marisela Heman, DO   10 mL at 04/25/22 2037    sodium chloride flush 0.9 % injection 5-40 mL  5-40 mL IntraVENous PRN Marisela Heman, DO        0.9 % sodium chloride infusion   IntraVENous PRN Marisela Heman, DO        LORazepam (ATIVAN) injection 1 mg  1 mg IntraVENous Q5 Min PRN Marisela Heman, DO        heparin (porcine) injection 5,000 Units  5,000 Units SubCUTAneous TID Marisela Heman, DO   5,000 Units at 04/25/22 4570    ondansetron (ZOFRAN-ODT) disintegrating tablet 4 mg  4 mg Oral Q8H PRN Marisela Heman, DO        Or    [facility] injection 4 mg  4 mg IntraVENous Q6H PRN Marisela Heman, DO        polyethylene glycol [facility]) packet 17 g  17 g Oral Daily PRN Marisela Heman, DO        acetaminophen (TYLENOL) tablet 650 mg  650 mg Oral Q6H PRN Marisela Heman, DO   650 mg at 04/25/22 5023    Or    acetaminophen (TYLENOL) suppository 650 mg  650 mg Rectal Q6H PRN Marisela Heman, DO        levETIRAcetam (KEPPRA) 500 mg/100 mL IVPB  500 mg IntraVENous Q12H Marisela Heman, DO   Stopped at 04/25/22 0047    chlorhexidine (PERIDEX) 0.12 % solution 15 mL  15 mL Mouth/Throat BID Yadira Yo, DO   15 mL at 04/25/22 0804    famotidine (PEPCID) 20 mg in sodium chloride (PF) 10 mL injection  20 mg IntraVENous Daily Yadira Yo DO   20 mg at 04/25/22 2981    propofol injection  5-50 mcg/kg/min IntraVENous Continuous Yadira Yo, DO 18 mL/hr at 04/25/22 9652 50 mcg/kg/min at 04/25/22 7028    magic butt cream   Topical Q4H PRN Yadira oY DO   Given at 04/25/22 0028    niCARdipine (CARDENE) 25 mg in dextrose 5 % 250 mL infusion  2.5-15 mg/hr IntraVENous Continuous Yadira Yo DO   Stopped at 04/25/22 0798    magnesium sulfate 1000 mg in dextrose 5% 100 mL IVPB  1,000 mg IntraVENous PRN Patience Walter, DO        potassium bicarb-citric acid (EFFER-K) effervescent tablet 40 mEq  40 mEq Oral PRN Patience Walter, DO        Or    potassium chloride 10 mEq/100 mL IVPB (Peripheral Line)  10 mEq IntraVENous PRN Patience Walter,  mL/hr at 04/25/22 0832 10 mEq at 04/25/22 1838       Past Medical History:  Past Medical History:   Diagnosis Date    Arthritis     Chronic kidney disease, stage 5, kidney failure (HCC)     Closed fracture of right shoulder girdle, with routine healing, subsequent encounter     DM (diabetes mellitus) type II controlled with renal manifestation ([facility] 06/1993    Gastroparesis     GERD (gastroesophageal reflux disease)     Hemodialysis patient ([facility] [address]     dialysis on tues, thur, and sat at [facility]    Hypertension     Hypothyroid     Nasal congestion     recent    Neuropathy     Noncompliance with medications     Palliative care patient 10/08/2019    Restless leg syndrome        Past Surgical History:  Past Surgical History:   Procedure Laterality Date    BREAST SURGERY Left 2008    infected milk gland removed    BREAST SURGERY Right 5/25/2021    WEDGE EXCISION RIGHT BREAST DUCT WITH LACRIMAL DUCT PROBE, PEC BLOCK performed by Sindhu Nichole MD at [address] [place] [address] CHOLECYSTECTOMY, LAPAROSCOPIC      2008    COLONOSCOPY Left 9/17/2020    Dr Eris Cherry hepatic flexure-Severe tortuosity with severe spasming, 1 yr recall    DIALYSIS FISTULA CREATION Left 1/25/2019    REVISION LEFT UPPER EXTREMITY AV ACCESS WITH LEFT BRACHIAL ARTERY TO LEFT AXILLARY VEIN WITH  INTERPOSITIONAL ARTEGRAFT   performed by Bronwyn Johnson MD at [address]  2012    [address] Right 1/25/2019    INSERTION OF RIGHT INTERNAL JUGULAR HEMODIALYSIS CATHETER performed by Bronwyn Johnson MD at [address] [facility]  08/17/2018    1.  Moderately increased stainable iron identified by special stain in Kupffer cells and occasional hepatocytes. Negative for evidence of significant portal or lobular inflammation. Negative for evidence of significant fibrosis    [place] COLONOSCOPY W/BIOPSY SINGLE/MULTIPLE N/A 10/20/2017    Dr Reji Herndon prep-Tubular AP (-) dysplasia x 2--3 yr recall    [place] EGD TRANSORAL BIOPSY SINGLE/MULTIPLE N/A 12/27/2016    Dr America Loomis EGD TRANSORAL BIOPSY SINGLE/MULTIPLE N/A 10/20/2017    Dr Anshu Major (-)   [address] [facility] VASCULAR SURGERY Left 2016    fistula by Dr Ellen Joy at [address]  10/02/2017    [facility]. Left upper fistulograms/venograms, balloon angioplasty cephalic vein arch 08K70 conquest.    VASCULAR SURGERY  08/2018    FISTULOGRAM    VASCULAR SURGERY  10/29/2018    [facility]. Left upper fistulograms/venograms    VASCULAR SURGERY  12/19/2018    [facility]. Arch aortogram,left upper arteriograms.  VASCULAR SURGERY  03/06/2019    [facility]. Removal of tunneled dialyis catheter right internal jugular vein.  VASCULAR SURGERY  08/28/2019    [facility]. Left upper extremity fistulogram including venography to the superior vena cava. Balloon angioplasty left subclavian vein with 10mm x 40mm conquest balloon. Balloon angioplasty mid/proximal upper arm cephalic vein with 06SI x 40mm conquest balloon. Venograms after balloon angioplasty. Stent left mid/proximal upper arm cephalic vein stenosis fluency 10mm x 60mm self-expanding covered stent. Balloon     VASCULAR SURGERY  2019    cont angioplasty stent with 10mm x 40mm conquest balloon. Completion venograms left upper extremity. Family History  Family History   Problem Relation Age of Onset    Colon Cancer Mother          at 63    Colon Polyps Mother     Lung Cancer Father          at 79    Colon Polyps Father     Stomach Cancer Sister     Breast Cancer Sister 27    Depression Daughter 25        committed suicide    Liver Cancer Neg Hx     Liver Disease Neg Hx     Esophageal Cancer Neg Hx     Rectal Cancer Neg Hx        Social History  Social History     Socioeconomic History    Marital status: Single     Spouse name: Not on file    Number of children: 2    Years of education: Not on file    Highest education level: Not on file   Occupational History    Not on file   Tobacco Use    Smoking status: Current Every Day Smoker     Packs/day: 0.50     Years: 33.00     Pack years: 16.50     Types: Cigarettes    Smokeless tobacco: Never Used    Tobacco comment: NOW at 1/4 PD, started at age 24 and has averaged 2 PPD   Vaping Use    Vaping Use: Never used   Substance and Sexual Activity    Alcohol use: No    Drug use: Not Currently     Types: Marijuana [place] Kellogg)    Sexual activity: Not Currently     Partners: Male   Other Topics Concern    Not on file   Social History Narrative    Not on file     Social Determinants of Health     Financial Resource Strain:     Difficulty of Paying Living Expenses: Not on file   Food Insecurity:     Worried About Running Out of Food in the Last Year: Not on file    Kendy of Food in the Last Year: Not on file   Transportation Needs:     Lack of Transportation (Medical): Not on file    Lack of Transportation (Non-Medical):  Not on file   Physical Activity:     Days of Exercise per Week: Not on file    Minutes of Exercise per Session: Not on file   Stress:     Feeling of Stress : Not on file   Social Connections:     Frequency of Communication with Friends and Family: Not on file    Frequency of Social Gatherings with Friends and Family: Not on file    Attends Restorationism Services: Not on file    Active Member of Clubs or Organizations: Not on file    Attends Club or Organization Meetings: Not on file    Marital Status: Not on file   Intimate Partner Violence:     Fear of Current or Ex-Partner: Not on file    Emotionally Abused: Not on file    Physically Abused: Not on file    Sexually Abused: Not on file   Housing Stability:     Unable to Pay for Housing in the Last Year: Not on file    Number of Places Lived in the Last Year: Not on file    Unstable Housing in the Last Year: Not on file         Review of Systems:  Review of system is unobtainable as she is intubated and sedated.}       Objective:  Patient Vitals for the past 24 hrs:   BP Temp Temp src Pulse Resp SpO2 Weight   04/25/22 0800 (!) 168/74 98.2 °F (36.8 °C) Axillary 87 16 100 %    04/25/22 0700 (!) 157/70   88 16 100 %    04/25/22 0630 (!) 169/70   90 18 100 %    04/25/22 0618    87 16 100 %    04/25/22 0615 (!) 177/70   87 16 100 %    04/25/22 0600 (!) 161/73   87 (!) 7 100 %    04/25/22 0500 (!) 140/66   86 16 100 %    04/25/22 0400 133/65 100.2 °F (37.9 °C) Axillary 87 16 100 %    04/25/22 0300 136/62   89 17 100 %    04/25/22 0207    88 17 100 %    04/25/22 0200 (!) 163/69   88 17 100 %    04/25/22 0130 (!) 151/68   85 16 100 %    04/25/22 0100 (!) 148/65   83 16 100 %    04/25/22 0030 (!) 140/63 100.9 °F (38.3 °C) Axillary 84 16 100 %    04/25/22 0000 135/63   86 16 100 %    04/24/22 2300 (!) 149/61   87 (!) 3 100 %    04/24/22 2250  101.1 °F (38.4 °C)        04/24/22 2200 (!) 156/60   88 11 100 %    04/24/22 2154    89 11 100 %    04/24/22 2100 (!) 147/63   90 12 100 %    04/24/22 2025 (!) 161/57   95 12  144 lb 11.2 oz (65.6 kg)   04/24/22 2024 (!) 156/66 101.9 °F (38.8 °C) Axillary 96 13     04/24/22 2015 (!) 165/58   95 17     04/24/22 2000 (!) 156/59   95 16 100 %    04/24/22 1945 (!) 171/64   97 14     04/24/22 1932 (!) 194/83 101.8 °F (38.8 °C) Axillary 98 14 100 %    04/24/22 1930 (!) 202/80   98 14 100 %    04/24/22 1816     10 100 %    04/24/22 1815    97 18 100 %    04/24/22 1800 (!) 170/152   98 14 100 %    04/24/22 1700    99 15 100 %    04/24/22 1600    95 13 (!) 85 %    04/24/22 1344     20 100 %    04/24/22 1319       165 lb (74.8 kg)   04/24/22 1247    96  100 %    04/24/22 1230 (!) 145/79 97.7 °F (36.5 °C) Temporal 79 20 95 %        Intake/Output Summary (Last 24 hours) at 4/25/2022 0842  Last data filed at 4/25/2022 8282  Gross per 24 hour   Intake 2644.55 ml   Output 160 ml   Net 2484.55 ml     General: Intubated/sedated. HEENT: Normocephalic atraumatic head/orotracheal intubation  Neck: Supple without JVD or carotid bruits  Chest: Bilateral clear breath sounds. CVS: Regular rate and rhythm S1 and S2. Abdominal: Soft and nondistended positive bowel sounds. Extremities: No pedal edema. Skin: warm and dry without rash    Labs:  BMP:   Recent Labs     04/24/22  2119 04/25/22  0119 04/25/22  0451   * 126* 127*   K 3.0* 3.0* 3.2*   CL 84* 84* 87*   CO2 21* 23 23   PHOS 3.7 2.7 2.6   BUN 65* 65* 65*   CREATININE 5.3* 5.5* 5.7*   CALCIUM 8.7 8.7 9.0     CBC:   Recent Labs     04/24/22  1228   WBC 8.5   HGB 11.3*   HCT 36.4*   MCV 92.6        LIVER PROFILE:   Recent Labs     04/24/22  1228   AST 19   ALT 15   BILITOT 0.7   ALKPHOS 134*     PT/INR: No results for input(s): PROTIME, INR in the last 72 hours. APTT: No results for input(s): APTT in the last 72 hours. BNP:  No results for input(s): BNP in the last 72 hours. Ionized Calcium:No results for input(s): IONCA in the last 72 hours.   Magnesium:  Recent Labs     04/24/22  3002 04/25/22  0119 04/25/22  0451   MG 2.2 2.2 2.2     Phosphorus:  Recent Labs     04/24/22  2119 04/25/22  0119 04/25/22  0451   PHOS 3.7 2.7 2.6     HgbA1C:   Recent Labs     04/24/22  1228   LABA1C 9.7*     Hepatic:   Recent Labs     04/24/22  1228   ALKPHOS 134*   ALT 15   AST 19   PROT 6.9   BILITOT 0.7   LABALBU 4.0     Lactic Acid: No results for input(s): LACTA in the last 72 hours. Troponin: No results for input(s): CKTOTAL, CKMB, TROPONINT in the last 72 hours. ABGs:   Lab Results   Component Value Date    PHART 7.330 04/24/2022    PO2ART 389.0 04/24/2022    EPF7ZAJ 41.0 04/24/2022     CRP:  No results for input(s): CRP in the last 72 hours. Sed Rate:  No results for input(s): SEDRATE in the last 72 hours. Culture Results:   Blood Culture Recent: No results for input(s): BC in the last 720 hours. Urine Culture Recent : No results for input(s): LABURIN in the last 720 hours. Radiology reports as per the Radiologist  Radiology: CT Head WO Contrast    Result Date: 4/24/2022  CT HEAD WO CONTRAST 4/24/2022 2:01 PM HISTORY: Altered mental status COMPARISON: CT scan dated 11/18/2021 DOSE LENGTH PRODUCT: 766 mGy cm TECHNIQUE: Helical tomographic images of the brain were obtained without the use of intravenous contrast. Automated exposure control was also utilized to decrease patient radiation dose. FINDINGS: There is no evidence of evolving large vascular territory infarct. No visualized intra-axial or extra-axial hemorrhage. No mass lesion is identified. Normal size and configuration of the ventricular system. The basal cisterns are symmetric. Posterior fossa structures are unremarkable. The included orbits and their contents are unremarkable. Chronic paranasal sinus disease on the left. The visualized osseous structures and overlying soft tissues of the skull and face are unremarkable. 1. No acute intracranial process.  Signed by Dr Obed Bear    XR CHEST PORTABLE    Result Date: 4/24/2022  XR CHEST PORTABLE 4/24/2022 4:27 PM HISTORY: ET tube, enteric tube  Technique: Single AP view of the chest COMPARISONS: 4/24/2022 FINDINGS: Endotracheal tube is identified with tip essentially touching the krysta. Enteric tube courses into the stomach with tip curled in the fundus back towards the gastroesophageal junction. The lungs are clear and well expanded. Heart size is stable. Pulmonary vasculature are nondilated. No pleural effusion or pneumothorax. 1. Endotracheal tube is deep, tip essentially touching the krysta. Lungs are clear and well expanded. I would recommend 2-3 cm withdrawal for a more optimal positioning. The results of this exam were discussed with Frannie Davidson (ICU nursing) on 4/24/2022 at 1640 hours Signed by Dr Max Dia    XR CHEST PORTABLE    Result Date: 4/24/2022  XR CHEST PORTABLE 4/24/2022 1:17 PM HISTORY: ET tube placement  Technique: Single AP view of the chest COMPARISONS: 4/24/2022 FINDINGS: Status post endotracheal intubation. ET tube is in good position. Lungs are well-expanded. Enteric tube curls on itself in the midesophagus and then extends back up into the throat. Heart size is stable. Pulmonary vasculature are nondilated. 1. ET tube is in good position. Lungs are clear and well expanded. 2. Enteric tube curls on itself in the mid esophagus before extending back up into the throat. This will need repositioned. The results of this exam were discussed with Dr. Walton [facility] on 4/24/2022 at 1329 hours Signed by Dr Max Dia    XR CHEST PORTABLE    Result Date: 4/24/2022  XR CHEST PORTABLE 4/24/2022 12:34 PM HISTORY: Altered mental status  Technique: Single AP view of the chest COMPARISONS: Chest exam dated 4/1/2022 FINDINGS: No lung consolidation. No pleural effusion or pneumothorax. Cardiomediastinal silhouette and pulmonary vasculature are unremarkable. No acute bony abnormality. Left subclavian region vascular stent with additional stent projecting over the left humerus.     No acute cardiopulmonary process. Signed by Dr Jonelle Torres    Result Date: 4/1/2022  XR CHEST PORTABLE 4/1/2022 12:35 PM HISTORY: Shortness of breath  Technique: Single AP view of the chest COMPARISONS: Chest exam dated 1/22/2022 FINDINGS: Cardiomegaly with central pulmonary vascular congestion, interstitial and alveolar edema as well as bilateral moderate layering pleural effusions. No pneumothorax. No acute bony abnormality. 1. Volume overload with interstitial and carie pulmonary edema as well as bilateral moderate pleural effusions. Signed by Dr Elissa Pelaez   1. End-stage renal disease on maintenance dialysis. 2.  Type II diabetic nephropathy. 3.  Acute respiratory failure/intubated. 4.  Status post grand mal seizure  5. Hyponatremia. 6.  Anemia of chronic kidney disease. 7.  Urinary tract infection. 8.  Pulmonary edema  9. Hypokalemia      Plan:  1. Routine dialysis treatment tomorrow. 2.  Pending MRI of the brain. 3.  EEG monitoring. 4.  Continue IV antibiotics.   [address], MD  04/25/22  8:42 AM

## 2022-04-25 NOTE — PROGRESS NOTES
Pt's ET at the 25cm sanju. Called RT to bedside and he moved tube to 23 cm sanju per cxr report advising to move it out 2-3 cm. Pt current sp02 is 100% on 45% FiO2. Monitoring and will recheck placement on am CXR.   Electronically signed by Marian Mcclure RN on 4/24/2022 at 8:15 PM

## 2022-04-25 NOTE — PROGRESS NOTES
[facility] Neurology Progress Note      Patient:   Lupis Herrmann  MR#:    [id number]   Room:    0702/702-01   YOB: 1970  Date of Progress Note: 4/25/2022  Time of Note                           2:56 PM  Consulting Physician:  Mable Mary DO  Attending Physician:  Twin Queen DO      INTERVAL HISTORY:  No acute events, remains sedated, intubated, no seizures overnight. REVIEW OF SYSTEMS:  Unable to obtain     PHYSICAL EXAM:    Constitutional    BP (!) 156/69   Pulse 91   Temp 98.7 °F (37.1 °C) (Axillary)   Resp 17   Wt 144 lb 11.2 oz (65.6 kg)   SpO2 100%   BMI 23.36 kg/m²   General appearance: Intubated, sedated   EYES -   Conjunctiva normal  Pupillary exam as below, see CN exam in the neurologic exam  ENT-    No scars, masses, or lesions over external nose or ears  Oropharynx - intubated  Cardiovascular -   No clubbing, cyanosis  Pulmonary-   Mechanically ventilated   Musculoskeletal    No significant wasting of muscles noted  Gait as below, see gait exam in the neurologic exam  Muscle strength, tone, stability as below see the motor exam in the neurologic exam.   No bony deformities  Skin    Warm, dry, and intact to inspection and palpation. No rash, erythema, or pallor        NEUROLOGICAL EXAM     Mental status    []? Awake, alert, oriented   []? Affect attention and concentration appear appropriate  []? Recent and remote memory appears unremarkable  []? Speech normal without dysarthria or aphasia, comprehension and repetition intact. COMMENTS:  Unresponsive to voice, not following commands, sedated    Cranial Nerves []? No VF deficit to confrontation,  optic discs normal, no papilledema on fundoscopic exam.  []? PERRLA, EOMI, no nystagmus, conjugate eye movements, no ptosis  []? Face symmetric  []? Facial sensation intact  []? Tongue midline no atrophy or fasciculations present  []? Palate midline, hearing to finger rub normal  []?  Shoulder shrug and SCM testing normal  COMMENTS:  PERRL, Face appears sym, OCR present   Motor   []? 5/5 strength x 4 extremities  [x]? Normal bulk and tone  [x]? No tremor present  []? No rigidity or bradykinesia noted  COMMENTS: Withdrawal x 4   Sensory  []? Sensation intact to light touch, pin prick, vibration, and proprioception BLE  []? Sensation intact to light touch, pin prick, vibration, and proprioception BUE  COMMENTS: Limited    Coordination []? FTN normal bilaterally   []? HTS normal bilaterally  []? SANFORD normal.   COMMENTS: Limited    Reflexes  [x]? Symmetric and non-pathological  [x]? Toes downgoing bilaterally  [x]? No clonus present  COMMENTS:   Gait                  []? Normal steady gait    []? Ataxic    []? Spastic     []? Magnetic     []? Shuffling  [x]? Not assessed  COMMENTS:        LABS/IMAGING:    CT Head WO Contrast    Result Date: 4/24/2022  CT HEAD WO CONTRAST 4/24/2022 2:01 PM HISTORY: Altered mental status COMPARISON: CT scan dated 11/18/2021 DOSE LENGTH PRODUCT: 766 mGy cm TECHNIQUE: Helical tomographic images of the brain were obtained without the use of intravenous contrast. Automated exposure control was also utilized to decrease patient radiation dose. FINDINGS: There is no evidence of evolving large vascular territory infarct. No visualized intra-axial or extra-axial hemorrhage. No mass lesion is identified. Normal size and configuration of the ventricular system. The basal cisterns are symmetric. Posterior fossa structures are unremarkable. The included orbits and their contents are unremarkable. Chronic paranasal sinus disease on the left. The visualized osseous structures and overlying soft tissues of the skull and face are unremarkable. 1. No acute intracranial process.  Signed by Dr Lori Cm    Result Date: 4/24/2022  XR CHEST PORTABLE 4/24/2022 4:27 PM HISTORY: ET tube, enteric tube  Technique: Single AP view of the chest COMPARISONS: 4/24/2022 FINDINGS: Endotracheal tube is identified with tip essentially touching the krysta. Enteric tube courses into the stomach with tip curled in the fundus back towards the gastroesophageal junction. The lungs are clear and well expanded. Heart size is stable. Pulmonary vasculature are nondilated. No pleural effusion or pneumothorax. 1. Endotracheal tube is deep, tip essentially touching the krysta. Lungs are clear and well expanded. I would recommend 2-3 cm withdrawal for a more optimal positioning. The results of this exam were discussed with Alesia Davidson (ICU nursing) on 4/24/2022 at 1640 hours Signed by Dr Emerald Bay    XR CHEST PORTABLE    Result Date: 4/24/2022  XR CHEST PORTABLE 4/24/2022 1:17 PM HISTORY: ET tube placement  Technique: Single AP view of the chest COMPARISONS: 4/24/2022 FINDINGS: Status post endotracheal intubation. ET tube is in good position. Lungs are well-expanded. Enteric tube curls on itself in the midesophagus and then extends back up into the throat. Heart size is stable. Pulmonary vasculature are nondilated. 1. ET tube is in good position. Lungs are clear and well expanded. 2. Enteric tube curls on itself in the mid esophagus before extending back up into the throat. This will need repositioned. The results of this exam were discussed with Dr. Milli Bartlett on 4/24/2022 at 1329 hours Signed by Dr Emerald Bay    XR CHEST PORTABLE    Result Date: 4/24/2022  XR CHEST PORTABLE 4/24/2022 12:34 PM HISTORY: Altered mental status  Technique: Single AP view of the chest COMPARISONS: Chest exam dated 4/1/2022 FINDINGS: No lung consolidation. No pleural effusion or pneumothorax. Cardiomediastinal silhouette and pulmonary vasculature are unremarkable. No acute bony abnormality. Left subclavian region vascular stent with additional stent projecting over the left humerus. No acute cardiopulmonary process.  Signed by Dr Emerald Bay      Lab Results   Component Value Date    WBC 8.5 04/24/2022    HGB 11.3 (L) 04/24/2022    HCT 36.4 (L) 04/24/2022    MCV 92.6 04/24/2022     04/24/2022     Lab Results   Component Value Date     (LL) 04/25/2022    K 4.6 04/25/2022    CL 78 (L) 04/25/2022    CO2 20 (L) 04/25/2022    BUN 59 (H) 04/25/2022    CREATININE 5.1 (H) 04/25/2022    GLUCOSE 405 (H) 04/25/2022    CALCIUM 7.8 (L) 04/25/2022    PROT 6.9 04/24/2022    LABALBU 4.0 04/24/2022    BILITOT 0.7 04/24/2022    ALKPHOS 134 (H) 04/24/2022    AST 19 04/24/2022    ALT 15 04/24/2022    LABGLOM 9 (A) 04/25/2022    GFRAA 11 (L) 04/25/2022     Lab Results   Component Value Date    INR 0.96 01/01/2022    INR 0.97 11/18/2021    INR 1.06 05/29/2021    PROTIME 13.0 01/01/2022    PROTIME 13.1 11/18/2021    PROTIME 13.7 05/29/2021       RECORD REVIEW:   Previous medical records, medications were reviewed at today's visit. Nursing/physician notes, imaging, labs and vitals reviewed. ASSESSMENT:  47 y.o. admitted after an episode of unresponsiveness followed by generalized tonic-clonic event in the emergency department. She had profoundly elevated glucose possible hyperglycemic coma, hyponatremia, underlying UTI,  end-stage renal disease with a history of noncompliance noted as well. Head CT nothing acute. No seizure noted on cEEG overnight.      PLAN:  1. MRI brain when able  2. Continue cEEG monitoring  3. Continue Keppra, on propofol   4. Continue addressing blood glucose, hyponatremia, UTI, ESRD nephrology following    Please feel free to call with any questions. [phone] (cell phone).       Niya Romero, DO  Board Certified Neurology

## 2022-04-25 NOTE — ACP (ADVANCE CARE PLANNING)
Advance Care Planning     Advance Care Planning (ACP) Physician/NP/PA (Provider) Conversation      Date of ACP Conversation: 4/24/2022    Conversation Conducted with: With pts significant other and sister over phone    Health Care Decision Maker:    Current Designated Health Care Decision Maker:     Primary Decision Maker: Yobani Leiva - Other - [phone]    Secondary Decision Maker: Bhumika Ortez - Brother/Sister - [phone]    Care Preferences: \"If your health worsens and it becomes clear that your chance of recovery is unlikely, what would your preference be about the use of a ventilator (breathing machine) if it was available to you? \"   YES    Resuscitation:  \"CPR works best to restart the heart when there is a sudden event, like a heart attack, in someone who is otherwise healthy. Unfortunately, CPR does not typically restart the heart for people who have serious health conditions or who are very sick. \"    \"In the event your heart stopped as a result of an underlying serious health condition, would you want attempts to be made to restart your heart (answer \"yes\" for attempt to resuscitate) or would you prefer a natural death (answer \"no\" for do not attempt to resuscitate)? \"   YES    Conversation Outcomes / Follow-Up Plan:   Living will on file listing pts significant other, Maeve Ground, as surrogate decision maker.      Length of Voluntary ACP Conversation in minutes: 14 minutes    Nathalia Arzate, APRN - CNP

## 2022-04-26 LAB
ALBUMIN SERPL-MCNC: 2.6 G/DL (ref 3.5–5.2)
ALP BLD-CCNC: 95 U/L (ref 35–104)
ALT SERPL-CCNC: 9 U/L (ref 5–33)
ANION GAP SERPL CALCULATED.3IONS-SCNC: 14 MMOL/L (ref 7–19)
AST SERPL-CCNC: 13 U/L (ref 5–32)
BILIRUB SERPL-MCNC: 0.4 MG/DL (ref 0.2–1.2)
BUN BLDV-MCNC: 31 MG/DL (ref 6–20)
CALCIUM SERPL-MCNC: 8.6 MG/DL (ref 8.6–10)
CHLORIDE BLD-SCNC: 93 MMOL/L (ref 98–111)
CO2: 24 MMOL/L (ref 22–29)
CREAT SERPL-MCNC: 3.7 MG/DL (ref 0.5–0.9)
GFR AFRICAN AMERICAN: 15
GFR NON-AFRICAN AMERICAN: 13
GLUCOSE BLD-MCNC: 110 MG/DL (ref 70–99)
GLUCOSE BLD-MCNC: 123 MG/DL (ref 74–109)
GLUCOSE BLD-MCNC: 195 MG/DL (ref 70–99)
GLUCOSE BLD-MCNC: 200 MG/DL (ref 70–99)
GLUCOSE BLD-MCNC: 227 MG/DL (ref 70–99)
HCT VFR BLD CALC: 28.3 % (ref 37–47)
HEMOGLOBIN: 9.1 G/DL (ref 12–16)
MAGNESIUM: 2.1 MG/DL (ref 1.6–2.6)
MCH RBC QN AUTO: 29.4 PG (ref 27–31)
MCHC RBC AUTO-ENTMCNC: 32.2 G/DL (ref 33–37)
MCV RBC AUTO: 91.3 FL (ref 81–99)
PDW BLD-RTO: 19 % (ref 11.5–14.5)
PERFORMED ON: ABNORMAL
PHOSPHORUS: 3.6 MG/DL (ref 2.5–4.5)
PLATELET # BLD: 121 K/UL (ref 130–400)
PMV BLD AUTO: 10.8 FL (ref 9.4–12.3)
POTASSIUM SERPL-SCNC: 3.7 MMOL/L (ref 3.5–5)
RBC # BLD: 3.1 M/UL (ref 4.2–5.4)
SODIUM BLD-SCNC: 131 MMOL/L (ref 136–145)
TOTAL PROTEIN: 5.7 G/DL (ref 6.6–8.7)
URINE CULTURE, ROUTINE: NORMAL
WBC # BLD: 5.8 K/UL (ref 4.8–10.8)

## 2022-04-26 PROCEDURE — 95720 EEG PHY/QHP EA INCR W/VEEG: CPT | Performed by: PSYCHIATRY & NEUROLOGY

## 2022-04-26 PROCEDURE — 82947 ASSAY GLUCOSE BLOOD QUANT: CPT

## 2022-04-26 PROCEDURE — 99232 SBSQ HOSP IP/OBS MODERATE 35: CPT

## 2022-04-26 PROCEDURE — 80053 COMPREHEN METABOLIC PANEL: CPT

## 2022-04-26 PROCEDURE — 2580000003 HC RX 258: Performed by: INTERNAL MEDICINE

## 2022-04-26 PROCEDURE — 84100 ASSAY OF PHOSPHORUS: CPT

## 2022-04-26 PROCEDURE — 6370000000 HC RX 637 (ALT 250 FOR IP): Performed by: INTERNAL MEDICINE

## 2022-04-26 PROCEDURE — 83735 ASSAY OF MAGNESIUM: CPT

## 2022-04-26 PROCEDURE — 95708 EEG WO VID EA 12-26HR UNMNTR: CPT

## 2022-04-26 PROCEDURE — 99233 SBSQ HOSP IP/OBS HIGH 50: CPT | Performed by: PSYCHIATRY & NEUROLOGY

## 2022-04-26 PROCEDURE — 2100000000 HC CCU R&B

## 2022-04-26 PROCEDURE — 6360000002 HC RX W HCPCS: Performed by: INTERNAL MEDICINE

## 2022-04-26 PROCEDURE — 8010000000 HC HEMODIALYSIS ACUTE INPT

## 2022-04-26 PROCEDURE — 94003 VENT MGMT INPAT SUBQ DAY: CPT

## 2022-04-26 PROCEDURE — 2700000000 HC OXYGEN THERAPY PER DAY

## 2022-04-26 PROCEDURE — 2500000003 HC RX 250 WO HCPCS: Performed by: INTERNAL MEDICINE

## 2022-04-26 PROCEDURE — 85027 COMPLETE CBC AUTOMATED: CPT

## 2022-04-26 RX ADMIN — Medication 10 ML: at 21:23

## 2022-04-26 RX ADMIN — PROPOFOL 35 MCG/KG/MIN: 10 INJECTION, EMULSION INTRAVENOUS at 17:46

## 2022-04-26 RX ADMIN — Medication 10 ML: at 07:43

## 2022-04-26 RX ADMIN — SODIUM CHLORIDE, PRESERVATIVE FREE 20 MG: 5 INJECTION INTRAVENOUS at 07:39

## 2022-04-26 RX ADMIN — CHLORHEXIDINE GLUCONATE 15 ML: 1.2 RINSE ORAL at 21:23

## 2022-04-26 RX ADMIN — HYDRALAZINE HYDROCHLORIDE 100 MG: 50 TABLET, FILM COATED ORAL at 05:09

## 2022-04-26 RX ADMIN — DEXTROSE MONOHYDRATE 10 MG/HR: 50 INJECTION, SOLUTION INTRAVENOUS at 20:06

## 2022-04-26 RX ADMIN — DEXTROSE MONOHYDRATE 10 MG/HR: 50 INJECTION, SOLUTION INTRAVENOUS at 23:05

## 2022-04-26 RX ADMIN — HEPARIN SODIUM 5000 UNITS: 5000 INJECTION INTRAVENOUS; SUBCUTANEOUS at 07:40

## 2022-04-26 RX ADMIN — DEXTROSE MONOHYDRATE 10 MG/HR: 50 INJECTION, SOLUTION INTRAVENOUS at 17:17

## 2022-04-26 RX ADMIN — PROPOFOL 50 MCG/KG/MIN: 10 INJECTION, EMULSION INTRAVENOUS at 06:41

## 2022-04-26 RX ADMIN — DEXTROSE MONOHYDRATE 5 MG/HR: 50 INJECTION, SOLUTION INTRAVENOUS at 14:20

## 2022-04-26 RX ADMIN — HYDRALAZINE HYDROCHLORIDE 100 MG: 50 TABLET, FILM COATED ORAL at 21:39

## 2022-04-26 RX ADMIN — PROPOFOL 50 MCG/KG/MIN: 10 INJECTION, EMULSION INTRAVENOUS at 01:18

## 2022-04-26 RX ADMIN — VANCOMYCIN HYDROCHLORIDE 125 MG: 1 INJECTION, POWDER, LYOPHILIZED, FOR SOLUTION INTRAVENOUS at 06:18

## 2022-04-26 RX ADMIN — HEPARIN SODIUM 5000 UNITS: 5000 INJECTION INTRAVENOUS; SUBCUTANEOUS at 14:48

## 2022-04-26 RX ADMIN — INSULIN LISPRO 2 UNITS: 100 INJECTION, SOLUTION INTRAVENOUS; SUBCUTANEOUS at 07:37

## 2022-04-26 RX ADMIN — INSULIN LISPRO 2 UNITS: 100 INJECTION, SOLUTION INTRAVENOUS; SUBCUTANEOUS at 21:24

## 2022-04-26 RX ADMIN — VANCOMYCIN HYDROCHLORIDE 125 MG: 1 INJECTION, POWDER, LYOPHILIZED, FOR SOLUTION INTRAVENOUS at 00:55

## 2022-04-26 RX ADMIN — HEPARIN SODIUM 5000 UNITS: 5000 INJECTION INTRAVENOUS; SUBCUTANEOUS at 21:23

## 2022-04-26 RX ADMIN — HYDRALAZINE HYDROCHLORIDE 100 MG: 50 TABLET, FILM COATED ORAL at 13:39

## 2022-04-26 RX ADMIN — INSULIN LISPRO 4 UNITS: 100 INJECTION, SOLUTION INTRAVENOUS; SUBCUTANEOUS at 16:10

## 2022-04-26 RX ADMIN — LEVETIRACETAM 500 MG: 5 INJECTION INTRAVENOUS at 12:34

## 2022-04-26 RX ADMIN — LEVETIRACETAM 500 MG: 5 INJECTION INTRAVENOUS at 00:56

## 2022-04-26 RX ADMIN — VANCOMYCIN HYDROCHLORIDE 125 MG: 1 INJECTION, POWDER, LYOPHILIZED, FOR SOLUTION INTRAVENOUS at 12:22

## 2022-04-26 RX ADMIN — VANCOMYCIN HYDROCHLORIDE 125 MG: 1 INJECTION, POWDER, LYOPHILIZED, FOR SOLUTION INTRAVENOUS at 17:46

## 2022-04-26 RX ADMIN — CHLORHEXIDINE GLUCONATE 15 ML: 1.2 RINSE ORAL at 09:55

## 2022-04-26 RX ADMIN — PROPOFOL 40 MCG/KG/MIN: 10 INJECTION, EMULSION INTRAVENOUS at 12:22

## 2022-04-26 ASSESSMENT — PULMONARY FUNCTION TESTS
PIF_VALUE: 29
PIF_VALUE: 23
PIF_VALUE: 28
PIF_VALUE: 23
PIF_VALUE: 22
PIF_VALUE: 27
PIF_VALUE: 22
PIF_VALUE: 24
PIF_VALUE: 24
PIF_VALUE: 30
PIF_VALUE: 29
PIF_VALUE: 23
PIF_VALUE: 23
PIF_VALUE: 26
PIF_VALUE: 24
PIF_VALUE: 30
PIF_VALUE: 30
PIF_VALUE: 33
PIF_VALUE: 28
PIF_VALUE: 28
PIF_VALUE: 23
PIF_VALUE: 30
PIF_VALUE: 23
PIF_VALUE: 24
PIF_VALUE: 24
PIF_VALUE: 23
PIF_VALUE: 29
PIF_VALUE: 25
PIF_VALUE: 23
PIF_VALUE: 28
PIF_VALUE: 23
PIF_VALUE: 27
PIF_VALUE: 28
PIF_VALUE: 30
PIF_VALUE: 28
PIF_VALUE: 24
PIF_VALUE: 26
PIF_VALUE: 22
PIF_VALUE: 30
PIF_VALUE: 29
PIF_VALUE: 27
PIF_VALUE: 29
PIF_VALUE: 30
PIF_VALUE: 23
PIF_VALUE: 22
PIF_VALUE: 23
PIF_VALUE: 28
PIF_VALUE: 27
PIF_VALUE: 24
PIF_VALUE: 28
PIF_VALUE: 29
PIF_VALUE: 23
PIF_VALUE: 27
PIF_VALUE: 28
PIF_VALUE: 23
PIF_VALUE: 24
PIF_VALUE: 27
PIF_VALUE: 28
PIF_VALUE: 23
PIF_VALUE: 23
PIF_VALUE: 31
PIF_VALUE: 23
PIF_VALUE: 25
PIF_VALUE: 22
PIF_VALUE: 27
PIF_VALUE: 28

## 2022-04-26 ASSESSMENT — PAIN SCALES - GENERAL
PAINLEVEL_OUTOF10: 0
PAINLEVEL_OUTOF10: 0
PAINLEVEL_OUTOF10: 2
PAINLEVEL_OUTOF10: 0

## 2022-04-26 NOTE — PROGRESS NOTES
Hospitalist Progress Note    Patient:  Jj Henley  YOB: 1970  Date of Service: 4/26/2022  MRN: [id number]   Acct: [de-identified]   Primary Care Physician: ROMINA Kendall  Advance Directive: Full Code  Admit Date: 4/24/2022       Hospital Day: 2  Referring Provider: Gildardo Duane, DO    Patient Seen, Chart, Consults, Notes, Labs, Radiology studies reviewed. Subjective:  Jj Henley is a 47 y.o. female  whom we are following for end-stage renal disease, nonketotic hyperosmolar hyperglycemia, hypoxemic respiratory failure, seizures. She received dialysis yesterday and again today. She has had almost 8 L of ultrafiltration. She is still hypertensive. We should be able to begin weaning soon.     Allergies:  Penicillins, Lamisil advanced [terbinafine], Reglan [metoclopramide], and Stadol [butorphanol]    Medicines:  Current Facility-Administered Medications   Medication Dose Route Frequency Provider Last Rate Last Admin    niCARdipine (CARDENE) 25 mg in dextrose 5 % 250 mL infusion  2.5-15 mg/hr IntraVENous Continuous Isaccrdo Duane,  mL/hr at 04/26/22 1717 10 mg/hr at 04/26/22 1717    sodium phosphate 10.5 mmol in sodium chloride 0.9 % 250 mL IVPB  0.16 mmol/kg IntraVENous PRN Patience Walter, DO        Or    sodium phosphate 21 mmol in sodium chloride 0.9 % 250 mL IVPB  0.32 mmol/kg IntraVENous PRN Patience Walter, DO        insulin lispro (HUMALOG) injection vial 0-12 Units  0-12 Units SubCUTAneous TID WC Gildardo Duane, DO   4 Units at 04/26/22 1610    insulin lispro (HUMALOG) injection vial 0-6 Units  0-6 Units SubCUTAneous Nightly Isaccrdo Duane, DO   1 Units at 04/25/22 2152    vancomycin (VANCOCIN) oral solution 125 mg  125 mg Oral 4 times per day Isacc Duane, DO   125 mg at 04/26/22 1746    hydrALAZINE (APRESOLINE) tablet 100 mg  100 mg Oral 3 times per day Patience Walter, DO   100 mg at 04/26/22 1339    propofol injection  5-50 mcg/kg/min IntraVENous Once Xuan Doles, DO 11.2 mL/hr at 04/24/22 1505 25 mcg/kg/min at 04/24/22 1505    glucagon (rDNA) injection 1 mg  1 mg IntraMUSCular PRN Xuan Doles, DO        dextrose 5 % solution  100 mL/hr IntraVENous PRN Xuan Doles, DO        glucose chewable tablet 16 g  4 tablet Oral PRN Xuan Doles, DO        dextrose bolus (hypoglycemia) 10% 125 mL  125 mL IntraVENous PRN Xuan Doles, DO   Stopped at 04/25/22 0790    Or    dextrose bolus (hypoglycemia) 10% 250 mL  250 mL IntraVENous PRN Xuan Doles, DO        sodium chloride flush 0.9 % injection 5-40 mL  5-40 mL IntraVENous 2 times per day Xuan Doles, DO   10 mL at 04/26/22 8033    sodium chloride flush 0.9 % injection 5-40 mL  5-40 mL IntraVENous PRN Xuan Doles, DO        0.9 % sodium chloride infusion   IntraVENous PRN Xuan Doles, DO        LORazepam (ATIVAN) injection 1 mg  1 mg IntraVENous Q5 Min PRN Xuan Doles, DO        heparin (porcine) injection 5,000 Units  5,000 Units SubCUTAneous TID Xuan Doles, DO   5,000 Units at 04/26/22 1448    ondansetron (ZOFRAN-ODT) disintegrating tablet 4 mg  4 mg Oral Q8H PRN Xuan Doles, DO        Or    ondansetron [facility]) injection 4 mg  4 mg IntraVENous Q6H PRN Xuan Doles, DO        polyethylene glycol [facility]) packet 17 g  17 g Oral Daily PRN Xuan Doles, DO        acetaminophen (TYLENOL) tablet 650 mg  650 mg Oral Q6H PRN Xuan Doles, DO   650 mg at 04/25/22 8425    Or    acetaminophen (TYLENOL) suppository 650 mg  650 mg Rectal Q6H PRN Xuan Doles, DO        levETIRAcetam (KEPPRA) 500 mg/100 mL IVPB  500 mg IntraVENous Q12H Xuan Doles, DO   Stopped at 04/26/22 1249    chlorhexidine (PERIDEX) 0.12 % solution 15 mL  15 mL Mouth/Throat BID Xuan Doles, DO   15 mL at 04/26/22 0955    famotidine (PEPCID) 20 mg in sodium chloride (PF) 10 mL injection  20 mg IntraVENous Daily Tristian Clines, DO   20 mg at 04/26/22 3827    propofol injection  5-50 mcg/kg/min IntraVENous Continuous Tristian Clines, DO 15.7 mL/hr at 04/26/22 1746 35 mcg/kg/min at 04/26/22 1746    magic butt cream   Topical Q4H PRN Tristian Clines, DO   Given at 04/25/22 0028    magnesium sulfate 1000 mg in dextrose 5% 100 mL IVPB  1,000 mg IntraVENous PRN Patience Walter, DO        potassium bicarb-citric acid (EFFER-K) effervescent tablet 40 mEq  40 mEq Oral PRN Patience Walter, DO        Or    potassium chloride 10 mEq/100 mL IVPB (Peripheral Line)  10 mEq IntraVENous PRN Kasandra Stanford, DO   Stopped at 04/25/22 1206       Past Medical History:  Past Medical History:   Diagnosis Date    Arthritis     Chronic kidney disease, stage 5, kidney failure (HCC)     Closed fracture of right shoulder girdle, with routine healing, subsequent encounter     DM (diabetes mellitus) type II controlled with renal manifestation ([facility] [address] 06/1993    Gastroparesis     GERD (gastroesophageal reflux disease)     Hemodialysis patient ([facility] [address]     dialysis on tu, thur, and sat at [facility]    Hypertension     Hypothyroid     Nasal congestion     recent    Neuropathy     Noncompliance with medications     Palliative care patient 10/08/2019    Restless leg syndrome        Past Surgical History:  Past Surgical History:   Procedure Laterality Date    BREAST SURGERY Left 2008    infected milk gland removed    BREAST SURGERY Right 5/25/2021    WEDGE EXCISION RIGHT BREAST DUCT WITH LACRIMAL DUCT PROBE, PEC BLOCK performed by Elizabeth Carballo MD at [address] [place] [address], LAPAROSCOPIC      2008    COLONOSCOPY Left 9/17/2020    Dr Duane Azul hepatic flexure-Severe tortuosity with severe spasming, 1 yr recall    DIALYSIS FISTULA CREATION Left 1/25/2019    REVISION LEFT UPPER EXTREMITY AV ACCESS WITH LEFT BRACHIAL ARTERY TO LEFT AXILLARY VEIN WITH  INTERPOSITIONAL ARTEGRAFT   performed by Alejandra Allan Hai Marin MD at [address]      [address] Right 2019    INSERTION OF RIGHT INTERNAL JUGULAR HEMODIALYSIS CATHETER performed by Zachary Guzmán MD at [address] [facility]  2018    1.  Moderately increased stainable iron identified by special stain in Kupffer cells and occasional hepatocytes. Negative for evidence of significant portal or lobular inflammation. Negative for evidence of significant fibrosis    SD COLONOSCOPY W/BIOPSY SINGLE/MULTIPLE N/A 10/20/2017    Dr Radha Collazo prep-Tubular AP (-) dysplasia x 2--3 yr recall    SD EGD TRANSORAL BIOPSY SINGLE/MULTIPLE N/A 2016    Dr Bia Pitts EGD TRANSORAL BIOPSY SINGLE/MULTIPLE N/A 10/20/2017    Dr Lizy Prieto (-)   [facility] VASCULAR SURGERY Left 2016    fistula by Dr Dick Hatfieldrs at [address]  10/02/2017    [facility]. Left upper fistulograms/venograms, balloon angioplasty cephalic vein arch 34Y39 conquest.    VASCULAR SURGERY  2018    FISTULOGRAM    VASCULAR SURGERY  10/29/2018    [facility]. Left upper fistulograms/venograms    VASCULAR SURGERY  2018    [facility]. Arch aortogram,left upper arteriograms.  VASCULAR SURGERY  2019    [facility]. Removal of tunneled dialyis catheter right internal jugular vein.  VASCULAR SURGERY  2019    [facility]. Left upper extremity fistulogram including venography to the superior vena cava. Balloon angioplasty left subclavian vein with 10mm x 40mm conquest balloon. Balloon angioplasty mid/proximal upper arm cephalic vein with 20MN x 40mm conquest balloon. Venograms after balloon angioplasty. Stent left mid/proximal upper arm cephalic vein stenosis fluency 10mm x 60mm self-expanding covered stent. Balloon     VASCULAR SURGERY  2019    cont angioplasty stent with 10mm x 40mm conquest balloon. Completion venograms left upper extremity.        Family History  Family History   Problem Relation Age of Onset    Colon Cancer Mother          at 61    Colon Polyps Mother    Jamal Shankar Father          at 79    Colon Polyps Father     Stomach Cancer Sister     Breast Cancer Sister 27    Depression Daughter 25        committed suicide    Liver Cancer Neg Hx     Liver Disease Neg Hx     Esophageal Cancer Neg Hx     Rectal Cancer Neg Hx        Social History  Social History     Socioeconomic History    Marital status: Single     Spouse name: Not on file    Number of children: 2    Years of education: Not on file    Highest education level: Not on file   Occupational History    Not on file   Tobacco Use    Smoking status: Current Every Day Smoker     Packs/day: 0.50     Years: 33.00     Pack years: 16.50     Types: Cigarettes    Smokeless tobacco: Never Used    Tobacco comment: NOW at 1/4 PD, started at age 24 and has averaged 2 PPD   Vaping Use    Vaping Use: Never used   Substance and Sexual Activity    Alcohol use: No    Drug use: Not Currently     Types: Marijuana Jena NovemberRussell    Sexual activity: Not Currently     Partners: Male   Other Topics Concern    Not on file   Social History Narrative    Not on file     Social Determinants of Health     Financial Resource Strain:     Difficulty of Paying Living Expenses: Not on file   Food Insecurity:     Worried About Running Out of Food in the Last Year: Not on file    Kendy of Food in the Last Year: Not on file   Transportation Needs:     Lack of Transportation (Medical): Not on file    Lack of Transportation (Non-Medical):  Not on file   Physical Activity:     Days of Exercise per Week: Not on file    Minutes of Exercise per Session: Not on file   Stress:     Feeling of Stress : Not on file   Social Connections:     Frequency of Communication with Friends and Family: Not on file    Frequency of Social Gatherings with Friends and Family: Not on file    Attends Judaism Services: Not on file    Active Member of Clubs or Organizations: Not on file    Attends Club or Organization Meetings: Not on file    Marital Status: Not on file   Intimate Partner Violence:     Fear of Current or Ex-Partner: Not on file    Emotionally Abused: Not on file    Physically Abused: Not on file    Sexually Abused: Not on file   Housing Stability:     Unable to Pay for Housing in the Last Year: Not on file    Number of Nicolette in the Last Year: Not on file    Unstable Housing in the Last Year: Not on file         Review of Systems:    Review of Systems   Unable to perform ROS: Intubated           Objective:  Blood pressure (!) 126/52, pulse 81, temperature 97.3 °F (36.3 °C), temperature source Temporal, resp. rate 16, height 5' 6\" (1.676 m), weight 144 lb 11.2 oz (65.6 kg), SpO2 100 %. Intake/Output Summary (Last 24 hours) at 4/26/2022 1811  Last data filed at 4/26/2022 1800  Gross per 24 hour   Intake 1368.66 ml   Output 3663 ml   Net -2294.34 ml       Physical Exam  Vitals and nursing note reviewed. Constitutional:       Appearance: She is ill-appearing. Interventions: She is sedated and intubated. HENT:      Head: Normocephalic and atraumatic. Right Ear: External ear normal.      Left Ear: External ear normal.      Nose: Nose normal.      Mouth/Throat:      Mouth: Mucous membranes are moist.   Eyes:      Conjunctiva/sclera: Conjunctivae normal.      Pupils: Pupils are equal, round, and reactive to light. Cardiovascular:      Rate and Rhythm: Normal rate and regular rhythm. Heart sounds: Normal heart sounds. Pulmonary:      Effort: Pulmonary effort is normal. She is intubated. Breath sounds: Normal breath sounds. Abdominal:      General: Abdomen is flat. Palpations: Abdomen is soft. Musculoskeletal:         General: Normal range of motion. Cervical back: Neck supple. No rigidity. No muscular tenderness. Skin:     General: Skin is warm and dry.          Labs:  BMP:   Recent Labs     04/25/22  0451 04/25/22  1041 04/26/22  0146   * 114* 131*   K 3.2* 4.6 3.7   CL 87* 78* 93*   CO2 23 20* 24   PHOS 2.6 2.7 3.6   BUN 65* 59* 31*   CREATININE 5.7* 5.1* 3.7*   CALCIUM 9.0 7.8* 8.6     CBC:   Recent Labs     04/24/22  1228 04/26/22  0146   WBC 8.5 5.8   HGB 11.3* 9.1*   HCT 36.4* 28.3*   MCV 92.6 91.3    121*     LIVER PROFILE:   Recent Labs     04/24/22  1228 04/26/22  0146   AST 19 13   ALT 15 9   BILITOT 0.7 0.4   ALKPHOS 134* 95     PT/INR: No results for input(s): PROTIME, INR in the last 72 hours. APTT: No results for input(s): APTT in the last 72 hours. BNP:  No results for input(s): BNP in the last 72 hours. Ionized Calcium:No results for input(s): IONCA in the last 72 hours. Magnesium:  Recent Labs     04/25/22  0451 04/25/22  1041 04/26/22  0146   MG 2.2 1.9 2.1     Phosphorus:  Recent Labs     04/25/22  0451 04/25/22  1041 04/26/22  0146   PHOS 2.6 2.7 3.6     HgbA1C:   Recent Labs     04/24/22  1228   LABA1C 9.7*     Hepatic:   Recent Labs     04/24/22  1228 04/26/22  0146   ALKPHOS 134* 95   ALT 15 9   AST 19 13   PROT 6.9 5.7*   BILITOT 0.7 0.4   LABALBU 4.0 2.6*     Lactic Acid: No results for input(s): LACTA in the last 72 hours. Troponin: No results for input(s): CKTOTAL, CKMB, TROPONINT in the last 72 hours. ABGs: No results for input(s): PH, PCO2, PO2, HCO3, O2SAT in the last 72 hours. CRP:  No results for input(s): CRP in the last 72 hours. Sed Rate:  No results for input(s): SEDRATE in the last 72 hours. Cultures:   No results for input(s): CULTURE in the last 72 hours. Recent Labs     04/24/22  1426 04/24/22  1447   BC  --  No Growth to date. Any change in status will be called. BLOODCULT2 No Growth to date. Any change in status will be called. --      No results for input(s): CXSURG in the last 72 hours.     Radiology reports as per the Radiologist  Radiology: CT Head WO Contrast    Result Date: 4/24/2022  CT HEAD WO CONTRAST 4/24/2022 2:01 PM HISTORY: Altered mental status COMPARISON: CT scan dated 11/18/2021 DOSE LENGTH PRODUCT: 766 mGy cm TECHNIQUE: Helical tomographic images of the brain were obtained without the use of intravenous contrast. Automated exposure control was also utilized to decrease patient radiation dose. FINDINGS: There is no evidence of evolving large vascular territory infarct. No visualized intra-axial or extra-axial hemorrhage. No mass lesion is identified. Normal size and configuration of the ventricular system. The basal cisterns are symmetric. Posterior fossa structures are unremarkable. The included orbits and their contents are unremarkable. Chronic paranasal sinus disease on the left. The visualized osseous structures and overlying soft tissues of the skull and face are unremarkable. 1. No acute intracranial process. Signed by Dr Cyrus Biggs    Result Date: 4/24/2022  XR CHEST PORTABLE 4/24/2022 4:27 PM HISTORY: ET tube, enteric tube  Technique: Single AP view of the chest COMPARISONS: 4/24/2022 FINDINGS: Endotracheal tube is identified with tip essentially touching the krysta. Enteric tube courses into the stomach with tip curled in the fundus back towards the gastroesophageal junction. The lungs are clear and well expanded. Heart size is stable. Pulmonary vasculature are nondilated. No pleural effusion or pneumothorax. 1. Endotracheal tube is deep, tip essentially touching the krysta. Lungs are clear and well expanded. I would recommend 2-3 cm withdrawal for a more optimal positioning. The results of this exam were discussed with Cherelle Cuellar (ICU nursing) on 4/24/2022 at 1640 hours Signed by Dr Julien Martinez    XR CHEST PORTABLE    Result Date: 4/24/2022  XR CHEST PORTABLE 4/24/2022 1:17 PM HISTORY: ET tube placement  Technique: Single AP view of the chest COMPARISONS: 4/24/2022 FINDINGS: Status post endotracheal intubation. ET tube is in good position. Lungs are well-expanded.  Enteric tube curls on itself in the midesophagus and then extends back up into the throat. Heart size is stable. Pulmonary vasculature are nondilated. 1. ET tube is in good position. Lungs are clear and well expanded. 2. Enteric tube curls on itself in the mid esophagus before extending back up into the throat. This will need repositioned. The results of this exam were discussed with Dr. JUÁREZ [facility] on 4/24/2022 at 1329 hours Signed by Dr Julien Martinez    XR CHEST PORTABLE    Result Date: 4/24/2022  XR CHEST PORTABLE 4/24/2022 12:34 PM HISTORY: Altered mental status  Technique: Single AP view of the chest COMPARISONS: Chest exam dated 4/1/2022 FINDINGS: No lung consolidation. No pleural effusion or pneumothorax. Cardiomediastinal silhouette and pulmonary vasculature are unremarkable. No acute bony abnormality. Left subclavian region vascular stent with additional stent projecting over the left humerus. No acute cardiopulmonary process. Signed by Dr Anabelle Alves. Continue Keppra. Neurology following.     Acute hypoxemic respiratory failure. Continue ventilatory support. Wean when feasible.     Nonketotic hyperosmolar hyperglycemia. Resolved  Transitioned to subcutaneous insulin     End-stage renal disease. Dialysis today for additional ultrafiltration.     Please document 43 minutes of critical care time for patient assessment, chart review, discussion with staff, .       Cong Kirby DO

## 2022-04-26 NOTE — PROGRESS NOTES
Shoulder shrug and SCM testing normal  COMMENTS:  PERRL, Face appears sym, OCR present   Motor   []? 5/5 strength x 4 extremities  [x]? Normal bulk and tone  [x]? No tremor present  []? No rigidity or bradykinesia noted  COMMENTS: Withdrawal x 4   Sensory  []? Sensation intact to light touch, pin prick, vibration, and proprioception BLE  []? Sensation intact to light touch, pin prick, vibration, and proprioception BUE  COMMENTS: Limited    Coordination []? FTN normal bilaterally   []? HTS normal bilaterally  []? SANFORD normal.   COMMENTS: Limited    Reflexes  [x]? Symmetric and non-pathological  [x]? Toes downgoing bilaterally  [x]? No clonus present  COMMENTS:   Gait                  []? Normal steady gait    []? Ataxic    []? Spastic     []? Magnetic     []? Shuffling  [x]? Not assessed  COMMENTS:        LABS/IMAGING:    CT Head WO Contrast    Result Date: 4/24/2022  CT HEAD WO CONTRAST 4/24/2022 2:01 PM HISTORY: Altered mental status COMPARISON: CT scan dated 11/18/2021 DOSE LENGTH PRODUCT: 766 mGy cm TECHNIQUE: Helical tomographic images of the brain were obtained without the use of intravenous contrast. Automated exposure control was also utilized to decrease patient radiation dose. FINDINGS: There is no evidence of evolving large vascular territory infarct. No visualized intra-axial or extra-axial hemorrhage. No mass lesion is identified. Normal size and configuration of the ventricular system. The basal cisterns are symmetric. Posterior fossa structures are unremarkable. The included orbits and their contents are unremarkable. Chronic paranasal sinus disease on the left. The visualized osseous structures and overlying soft tissues of the skull and face are unremarkable. 1. No acute intracranial process.  Signed by Dr Caitlin Connors    Result Date: 4/24/2022  XR CHEST PORTABLE 4/24/2022 4:27 PM HISTORY: ET tube, enteric tube  Technique: Single AP view of the chest COMPARISONS: 4/24/2022 FINDINGS: Endotracheal tube is identified with tip essentially touching the krysta. Enteric tube courses into the stomach with tip curled in the fundus back towards the gastroesophageal junction. The lungs are clear and well expanded. Heart size is stable. Pulmonary vasculature are nondilated. No pleural effusion or pneumothorax. 1. Endotracheal tube is deep, tip essentially touching the krysta. Lungs are clear and well expanded. I would recommend 2-3 cm withdrawal for a more optimal positioning. The results of this exam were discussed with Joe (ICU nursing) on 4/24/2022 at 1640 hours Signed by Dr Obed Bear    XR CHEST PORTABLE    Result Date: 4/24/2022  XR CHEST PORTABLE 4/24/2022 1:17 PM HISTORY: ET tube placement  Technique: Single AP view of the chest COMPARISONS: 4/24/2022 FINDINGS: Status post endotracheal intubation. ET tube is in good position. Lungs are well-expanded. Enteric tube curls on itself in the midesophagus and then extends back up into the throat. Heart size is stable. Pulmonary vasculature are nondilated. 1. ET tube is in good position. Lungs are clear and well expanded. 2. Enteric tube curls on itself in the mid esophagus before extending back up into the throat. This will need repositioned. The results of this exam were discussed with  [facility] on 4/24/2022 at 1329 hours Signed by Dr Obed Bear    XR CHEST PORTABLE    Result Date: 4/24/2022  XR CHEST PORTABLE 4/24/2022 12:34 PM HISTORY: Altered mental status  Technique: Single AP view of the chest COMPARISONS: Chest exam dated 4/1/2022 FINDINGS: No lung consolidation. No pleural effusion or pneumothorax. Cardiomediastinal silhouette and pulmonary vasculature are unremarkable. No acute bony abnormality. Left subclavian region vascular stent with additional stent projecting over the left humerus. No acute cardiopulmonary process.  Signed by Dr Obed Bear      Lab Results   Component Value Date    WBC 5.8 04/26/2022    HGB 9.1 (L) 04/26/2022    HCT 28.3 (L) 04/26/2022    MCV 91.3 04/26/2022     (L) 04/26/2022     Lab Results   Component Value Date     (L) 04/26/2022    K 3.7 04/26/2022    CL 93 (L) 04/26/2022    CO2 24 04/26/2022    BUN 31 (H) 04/26/2022    CREATININE 3.7 (H) 04/26/2022    GLUCOSE 123 (H) 04/26/2022    CALCIUM 8.6 04/26/2022    PROT 5.7 (L) 04/26/2022    LABALBU 2.6 (L) 04/26/2022    BILITOT 0.4 04/26/2022    ALKPHOS 95 04/26/2022    AST 13 04/26/2022    ALT 9 04/26/2022    LABGLOM 13 (A) 04/26/2022    GFRAA 15 (L) 04/26/2022     Lab Results   Component Value Date    INR 0.96 01/01/2022    INR 0.97 11/18/2021    INR 1.06 05/29/2021    PROTIME 13.0 01/01/2022    PROTIME 13.1 11/18/2021    PROTIME 13.7 05/29/2021       RECORD REVIEW:   Previous medical records, medications were reviewed at today's visit. Nursing/physician notes, imaging, labs and vitals reviewed. ASSESSMENT:  47 y.o. admitted after an episode of unresponsiveness followed by generalized tonic-clonic event in the emergency department. She had profoundly elevated glucose possible hyperglycemic coma, hyponatremia, underlying UTI,  end-stage renal disease with a history of noncompliance noted as well. Head CT nothing acute. No seizure noted on cEEG overnight. Exam stable, remains sedated.      PLAN:  1. MRI brain when able  2. Continue cEEG monitoring  3. Continue Keppra, wean propofol when able  4. Continue addressing blood glucose, hyponatremia, UTI, ESRD nephrology following    Please feel free to call with any questions. [phone] (cell phone).       Bucky Murray DO  Board Certified Neurology

## 2022-04-26 NOTE — CONSULTS
Comprehensive Nutrition Assessment    Type and Reason for Visit:  Initial,Consult    Nutrition Recommendations/Plan:   1. Start EN-Vital High Protein at 15ml/hr, advance by 5 ml every 6 hours til goal rate of 34 is reached     Malnutrition Assessment:  Malnutrition Status: At risk for malnutrition (Comment) (04/26/22 2398)    Context:  Acute Illness     Findings of the 6 clinical characteristics of malnutrition:  Energy Intake:  Mild decrease in energy intake (Comment)  Weight Loss:  No significant weight loss     Body Fat Loss:  No significant body fat loss     Muscle Mass Loss:  No significant muscle mass loss    Fluid Accumulation:  No significant fluid accumulation Extremities   Strength:  Not Performed    Nutrition Assessment:    Received consult for vent/tf orders and management. Pt appears adequately nourished. Propofol is at 22.4ml/hr. Suggest Vital High Protein with goal rate of 34ml/hr with 1 Proteinex as tf flush in 24 hours. Nutrition Related Findings:    on vent Wound Type: Stage II       Current Nutrition Intake & Therapies:    Average Meal Intake: NPO  Average Supplements Intake: NPO  Current Tube Feeding (TF) Orders:  · Feeding Route: Orogastric  · Formula: Peptide Based High Protein  · Schedule: Continuous  · Feeding Regimen: Vital High Protein goal rate 34ml/hr 1 Proteinex as tf flush  · Additives/Modulars: Protein  · Water Flushes: 25ml hourly  · Current TF & Flush Orders Provides: 0  · Goal TF & Flush Orders Provides: Vital High Protein @ 34ml/hr = 816 kcals with 71g protein, 90g CHO and 681ml free water from formula. Proteinex adds another 104 kcals with 26g protein. An additional 600ml free water is from flush      Anthropometric Measures:  Height: 5' 6\" (167.6 cm)  Ideal Body Weight (IBW): 130 lbs (59 kg)    Admission Body Weight: 165 lb (74.8 kg) (estimated)  Current Body Weight: 144 lb 11.2 oz (65.6 kg), 111.3 % IBW.     Current BMI (kg/m2): 23.4  Usual Body Weight: 144 lb (65.3 kg) (1/2022)  % Weight Change (Calculated): 0.5  BMI Categories: Normal Weight (BMI 18.5-24. 9)    Estimated Daily Nutrient Needs:  Energy Requirements Based On: Kcal/kg  Weight Used for Energy Requirements: Current  Energy (kcal/day): 3361-8693 kcals (20-25 kcals/kg)  Weight Used for Protein Requirements: Current  Protein (g/day): 98g  Method Used for Fluid Requirements: standard renal   Fluid (ml/day): 7248-7080 ml    Nutrition Diagnosis:   · Inadequate oral intake related to acute injury/trauma,impaired respiratory function as evidenced by NPO or clear liquid status due to medical condition,intubation,nutrition support - enteral nutrition      Nutrition Interventions:   Food and/or Nutrient Delivery: Start Tube Feeding  Nutrition Education/Counseling: No recommendation at this time,Education not appropriate  Coordination of Nutrition Care: Continue to monitor while inpatient       Goals:     Goals: Meet at least 75% of estimated needs,Initiate nutrition support       Nutrition Monitoring and Evaluation:   Behavioral-Environmental Outcomes: None Identified  Food/Nutrient Intake Outcomes: Enteral Nutrition Intake/Tolerance  Physical Signs/Symptoms Outcomes: Biochemical Data,Weight,Skin,Nutrition Focused Physical Findings,Fluid Status or Edema    Discharge Planning:     Too soon to determine     Shelby Hurd MS, RD, LD  Contact: [phone]

## 2022-04-26 NOTE — CARE COORDINATION
Patient is on the ventilator and unable to reach significant other at this time. Per chart review pt had been at [facility] for a very short period of time and refused to return there last admission. Per chart patient's s/o is the HCS as document is in chart and he stated to Palliative NP that she is to be a full code and wants everything done at this time. Per chart patient has a history of non compliance with her OPHD and other care. Will continue to try to reach HCS/SO for discharge planning.   Electronically signed by Edin Oleary RN on 4/26/2022 at 3:26 PM

## 2022-04-26 NOTE — PROGRESS NOTES
Palliative Care Progress Note  4/26/2022 8:24 AM    Patient:  Danitza Helton  YOB: 1970  Primary Care Physician: ROMINA Strong  Advance Directive: Full Code  Admit Date: 4/24/2022       Hospital Day: 2  Portions of this note have been copied forward, however, changed to reflect the most current clinical status of this patient. CHIEF COMPLAINT/REASON FOR CONSULTATION goals of care    SUBJECTIVE:  Ms. Florian Martin remains intubated/sedated. No acute events overnight. Review of Systems:   14 point review of systems is negative except as specifically addressed above. Objective:   VITALS:  BP (!) 166/55   Pulse 72   Temp 98.7 °F (37.1 °C) (Temporal)   Resp (!) 6   Wt 144 lb 11.2 oz (65.6 kg)   SpO2 100%   BMI 23.36 kg/m²   24HR INTAKE/OUTPUT:    Intake/Output Summary (Last 24 hours) at 4/26/2022 0824  Last data filed at 4/26/2022 0743  Gross per 24 hour   Intake 1642.73 ml   Output 162 ml   Net 1480.73 ml       General appearance: 48 yo female, chronically ill appearing, intubated/sedated  Head: Normocephalic, without obvious abnormality, atraumatic  Eyes: conjunctivae/corneas clear. PERRL  Ears: normal external ears and nose  Neck: no JVD, supple, symmetrical, trachea midline   Lungs: diminished to auscultation bilaterally, mechanically ventilated, equal chest rise  Heart: regular rate and rhythm, S1, S2 normal, no murmur  Abdomen: soft, non-tender, rounded, normal bowel sounds, FMSD in place with diarrhea noted  Extremities: No lower extremity edema,  No erythema, no tenderness to palpation, pedal pulses present  Skin: Pale, warm, dry  Neurologic: Intubated and sedated. Unresponsive to voice, not following commands. Withdrawals to pain. Generalized weakness, normal tone.      Medications:      dextrose      sodium chloride      propofol 50 mcg/kg/min (04/26/22 0641)      insulin lispro  0-12 Units SubCUTAneous TID WC    insulin lispro  0-6 Units SubCUTAneous Nightly    vancomycin 125 mg Oral 4 times per day    hydrALAZINE  100 mg Oral 3 times per day    propofol  5-50 mcg/kg/min IntraVENous Once    sodium chloride flush  5-40 mL IntraVENous 2 times per day    heparin (porcine)  5,000 Units SubCUTAneous TID    levetiracetam  500 mg IntraVENous Q12H    chlorhexidine  15 mL Mouth/Throat BID    famotidine (PEPCID) injection  20 mg IntraVENous Daily     sodium phosphate IVPB **OR** sodium phosphate IVPB, glucagon (rDNA), dextrose, glucose, dextrose bolus (hypoglycemia) **OR** dextrose bolus (hypoglycemia), sodium chloride flush, sodium chloride, LORazepam, ondansetron **OR** ondansetron, polyethylene glycol, acetaminophen **OR** acetaminophen, magic butt cream, magnesium sulfate, potassium alternative oral replacement **OR** potassium chloride  Diet NPO  ADULT TUBE FEEDING; Orogastric; Peptide Based High Protein; Continuous; 15; Yes; 5; Q 6 hours; 34; 25; Q 1 hour; Protein; 1 Proteinex as tf flush daily     Lab and other Data:     Recent Labs     04/24/22  1228 04/26/22  0146   WBC 8.5 5.8   HGB 11.3* 9.1*    121*     Recent Labs     04/25/22  0451 04/25/22  1041 04/26/22  0146   * 114* 131*   K 3.2* 4.6 3.7   CL 87* 78* 93*   CO2 23 20* 24   BUN 65* 59* 31*   CREATININE 5.7* 5.1* 3.7*   GLUCOSE 201* 405* 123*     Recent Labs     04/24/22  1228 04/26/22  0146   AST 19 13   ALT 15 9   BILITOT 0.7 0.4   ALKPHOS 134* 95       RAD:   CT Head WO Contrast  Result Date: 4/24/2022  1. No acute intracranial process. Signed by Dr April Clark    XR CHEST PORTABLE  Result Date: 4/24/2022  1. Endotracheal tube is deep, tip essentially touching the krysta. Lungs are clear and well expanded. I would recommend 2-3 cm withdrawal for a more optimal positioning. The results of this exam were discussed with Adiel Dawkins (ICU nursing) on 4/24/2022 at 1640 hours Signed by Dr April Clark    XR CHEST PORTABLE  Result Date: 4/24/2022  1. ET tube is in good position. Lungs are clear and well expanded.  2. Enteric tube curls on itself in the mid esophagus before extending back up into the throat. This will need repositioned. The results of this exam were discussed with  [facility] on 4/24/2022 at 1329 hours Signed by Dr Emerald Bay    XR CHEST PORTABLE  Result Date: 4/24/2022  No acute cardiopulmonary process. Signed by Dr Emerald Bay    Assessment/Plan   Principal Problem:    Seizure [facility])  Active Problems:    ESRD (end stage renal disease) on dialysis ([facility]    Hyponatremia    Uncontrolled hypertension    Palliative care patient    Uncontrolled type 2 diabetes mellitus with hyperosmolar nonketotic hyperglycemia ([facility] [address]    [facility]    UTI (urinary tract infection)  Resolved Problems:    * No resolved hospital problems. *      Visit Summary:  Chart reviewed, patient discussed with nursing staff. Reviewed health issues, work up and treatment plan as well as factors that lead to hospitalization. Ms. Susana Starkey is seen at bedside this morning with RN. She tolerated dialysis yesterday and is currently receiving dialysis treatment this morning. She remains intubated and sedated without change in neuro status. No overt seizure activity overnight on cEEG per neuro. Has transition to subcutaneous insulin but still requiring Cardene for hypertension. I attempted to call her significant other, Zee Baldwin, to update on patient's status and plan of care with no answer. Unable to leave a voicemail. Pts sister has reported he can be difficult to reach during the day. Will follow. Recommendations:   1. Palliative care- GOC continue all medical treatments and monitor for improvement. D/c planning based on hospital course. Code Status- FULL CODE    2. Acute hypoxemic respiratory failure- mgmt per hospitalist.  Vent support. Wean as tolerated    3. Hyperglycemia-improved, insulin infusion transition to subcu. 4.  S/p tonic-clonic seizure- neuro following. Keppra continued.   Continuous EEG monitoring remains. MRI brain when able    5. UTI- IV antibiotics continue. Follow cultures    6. ESRD on maintenance dialysis- nephrology following. Emergent dialysis yesterday tolerated well. Dialysis again today. Monitor labs    7. HTN- mgmt per hospitalist. cardene infusion. 8. Hyponatremia- labs improving. Thank you for consulting Palliative Care and allowing us to participate in the care of this patient.    Time Spent Counseling > 50%:  YES                                   Total Time Spent with patient/family counseling, workup/treatment review, counseling and placement of orders/preparation of this note: 26 minutes    Electronically signed by ROMINA Alfaro CNP on 4/26/2022 at 8:24 AM    (Please note that portions of this note were completed with a voice recognition program.  Kajal Rome made to edit the dictations but occasionally words are mis-transcribed.)

## 2022-04-26 NOTE — PROGRESS NOTES
Nephrology ([facility]) Progress Note    Patient:  Sg Luciano  YOB: 1970  Date of Service: 4/26/2022  MRN: [id number]   Acct: [de-identified]   Primary Care Physician: ROMINA Wilkins  Advance Directive: Full Code  Admit Date: 4/24/2022       Hospital Day: 2  Referring Provider: Mikey Abraham DO    Patient independently seen and examined, Chart, Consults, Notes, Operative notes, Labs, Cardiology, and Radiology studies reviewed as available. Chief complaint: Altered mental status/end-stage renal disease    Subjective:  Sg Luciano is a 47 y.o. female for whom we were consulted for evaluation and treatment of end-stage renal disease. Patient also has history of seizure disorder, type 2 diabetes, hypertension, frequent hospitalization with noncompliance and poorly controlled diabetes. Patient was brought to the emergency room after she was found to have unresponsiveness and witnessed grand mal seizure. She was brought in by ambulance. On arrival in the emergency room she was found to be severely hyperglycemic and had a urinary tract infection. Patient was intubated, sedated and currently being treated by neurology for grand mal seizure. She is also receiving IV antibiotics and insulin drip. Her blood sugar control has significantly improved. On April 25, she has received emergent hemodialysis treatment for correction of volume status as well as hyponatremia. She is now moved from ICU to CCU. This morning she remains intubated/sedated and has stable hemodynamics. Currently seen on hemodialysis  Hemodialysis access: AV fistula  Hemodialysis: 3-1/2-hour  Ultrafiltration: 3500 cc  2K bath  Blood flow rate is 450 cc/min    .     Allergies:  Penicillins, Lamisil advanced [terbinafine], Reglan [metoclopramide], and Stadol [butorphanol]    Medicines:  Current Facility-Administered Medications   Medication Dose Route Frequency Provider Last Rate Last Admin    sodium phosphate 10.5 mmol in sodium chloride 0.9 % 250 mL IVPB  0.16 mmol/kg IntraVENous PRN Patience Walter, DO        Or    sodium phosphate 21 mmol in sodium chloride 0.9 % 250 mL IVPB  0.32 mmol/kg IntraVENous PRN Patience Walter, DO        insulin lispro (HUMALOG) injection vial 0-12 Units  0-12 Units SubCUTAneous TID WC Reatha Bark, DO   2 Units at 04/26/22 0737    insulin lispro (HUMALOG) injection vial 0-6 Units  0-6 Units SubCUTAneous Nightly Reatha Bark, DO   1 Units at 04/25/22 2152    vancomycin (VANCOCIN) oral solution 125 mg  125 mg Oral 4 times per day Reatha Bark, DO   125 mg at 04/26/22 0355    hydrALAZINE (APRESOLINE) tablet 100 mg  100 mg Oral 3 times per day Patience Walter, DO   100 mg at 04/26/22 4925    propofol injection  5-50 mcg/kg/min IntraVENous Once Reatha Bark, DO 11.2 mL/hr at 04/24/22 1505 25 mcg/kg/min at 04/24/22 1505    glucagon (rDNA) injection 1 mg  1 mg IntraMUSCular PRN Reatha Bark, DO        dextrose 5 % solution  100 mL/hr IntraVENous PRN Reatha Bark, DO        glucose chewable tablet 16 g  4 tablet Oral PRN Reatha Bark, DO        dextrose bolus (hypoglycemia) 10% 125 mL  125 mL IntraVENous PRN Reatha Bark, DO   Stopped at 04/25/22 0014    Or    dextrose bolus (hypoglycemia) 10% 250 mL  250 mL IntraVENous PRN Reatha Bark, DO        sodium chloride flush 0.9 % injection 5-40 mL  5-40 mL IntraVENous 2 times per day Reatha Bark, DO   10 mL at 04/26/22 7639    sodium chloride flush 0.9 % injection 5-40 mL  5-40 mL IntraVENous PRN Reatha Bark, DO        0.9 % sodium chloride infusion   IntraVENous PRN Reatha Bark, DO        LORazepam (ATIVAN) injection 1 mg  1 mg IntraVENous Q5 Min PRN Reatha Bark, DO        heparin (porcine) injection 5,000 Units  5,000 Units SubCUTAneous TID Reatha Bark, DO   5,000 Units at 04/26/22 0740    ondansetron (ZOFRAN-ODT) disintegrating tablet 4 mg  4 mg Oral Q8H PRN Graeme Lava, DO        Or    ondansetron [facility]) injection 4 mg  4 mg IntraVENous Q6H PRN Graeme Lava, DO        polyethylene glycol [facility]) packet 17 g  17 g Oral Daily PRN Graeme Lava, DO        acetaminophen (TYLENOL) tablet 650 mg  650 mg Oral Q6H PRN Graeme Lava, DO   650 mg at 04/25/22 0196    Or    acetaminophen (TYLENOL) suppository 650 mg  650 mg Rectal Q6H PRN Graeme Lava, DO        levETIRAcetam (KEPPRA) 500 mg/100 mL IVPB  500 mg IntraVENous Q12H Graeme Lava, DO   Stopped at 04/26/22 0121    chlorhexidine (PERIDEX) 0.12 % solution 15 mL  15 mL Mouth/Throat BID Graeme Lava, DO   15 mL at 04/25/22 2153    famotidine (PEPCID) 20 mg in sodium chloride (PF) 10 mL injection  20 mg IntraVENous Daily Graeme Lava, DO   20 mg at 04/26/22 2273    propofol injection  5-50 mcg/kg/min IntraVENous Continuous Graeme Lava, DO 22.4 mL/hr at 04/26/22 0641 50 mcg/kg/min at 04/26/22 3045    magic butt cream   Topical Q4H PRN Graeme Lava, DO   Given at 04/25/22 0028    magnesium sulfate 1000 mg in dextrose 5% 100 mL IVPB  1,000 mg IntraVENous PRN Patience Walter, DO        potassium bicarb-citric acid (EFFER-K) effervescent tablet 40 mEq  40 mEq Oral PRN Patience Walter, DO        Or    potassium chloride 10 mEq/100 mL IVPB (Peripheral Line)  10 mEq IntraVENous PRN Cathlyn [place], DO   Stopped at 04/25/22 6215       Past Medical History:  Past Medical History:   Diagnosis Date    Arthritis     Chronic kidney disease, stage 5, kidney failure (HCC)     Closed fracture of right shoulder girdle, with routine healing, subsequent encounter     DM (diabetes mellitus) type II controlled with renal manifestation ([facility] 06/1993    Gastroparesis     GERD (gastroesophageal reflux disease)     [facility]od[facility] ([facility]     d[facility] on tues, thur, and sat [facility]    Hypertension  Hypothyroid     Nasal congestion     recent    Neuropathy     Noncompliance with medications     Palliative care patient 10/08/2019    Restless leg syndrome        Past Surgical History:  Past Surgical History:   Procedure Laterality Date    BREAST SURGERY Left 2008    infected milk gland removed    BREAST SURGERY Right 5/25/2021    WEDGE EXCISION RIGHT BREAST DUCT WITH LACRIMAL DUCT PROBE, PEC BLOCK performed by Kamari Lundberg MD at [address] [facility]      2008    COLONOSCOPY Left 9/17/2020    Dr Doretha Alcocer hepatic flexure-Severe tortuosity with severe spasming, 1 yr recall    DIALYSIS FISTULA CREATION Left 1/25/2019    REVISION LEFT UPPER EXTREMITY AV ACCESS WITH LEFT BRACHIAL ARTERY TO LEFT AXILLARY VEIN WITH  INTERPOSITIONAL ARTEGRAFT   performed by Kate Goss MD at [address]  2012    [address] Right 1/25/2019    INSERTION OF RIGHT INTERNAL JUGULAR HEMODIALYSIS CATHETER performed by Kate Goss MD at [facility]  08/17/2018    1.  Moderately increased stainable iron identified by special stain in Kupffer cells and occasional hepatocytes. Negative for evidence of significant portal or lobular inflammation. Negative for evidence of significant fibrosis    [place] COLONOSCOPY W/BIOPSY SINGLE/MULTIPLE N/A 10/20/2017    Dr Karl Partida prep-Tubular AP (-) dysplasia x 2--3 yr recall    [place] EGD TRANSORAL BIOPSY SINGLE/MULTIPLE N/A 12/27/2016    Dr Bindu Beth EGD TRANSORAL BIOPSY SINGLE/MULTIPLE N/A 10/20/2017    Dr Felix Lott (-)   [facility] VASCULAR SURGERY Left 2016    fistula by Dr Erica Watkins at [address]  10/02/2017    [facility]. Left upper fistulograms/venograms, balloon angioplasty cephalic vein arch 92E10 conquest.    VASCULAR SURGERY  08/2018    FISTULOGRAM    VASCULAR SURGERY  10/29/2018    [facility]. Left upper fistulograms/venograms    VASCULAR SURGERY  12/19/2018    [facility]. Rayne Masters upper arteriograms.  VASCULAR SURGERY  2019    [facility]. Removal of tunneled dialyis catheter right internal jugular vein.  VASCULAR SURGERY  2019    [facility]. Left upper extremity fistulogram including venography to the superior vena cava. Balloon angioplasty left subclavian vein with 10mm x 40mm conquest balloon. Balloon angioplasty mid/proximal upper arm cephalic vein with 21KX x 40mm conquest balloon. Venograms after balloon angioplasty. Stent left mid/proximal upper arm cephalic vein stenosis fluency 10mm x 60mm self-expanding covered stent. Balloon     VASCULAR SURGERY  2019    cont angioplasty stent with 10mm x 40mm conquest balloon. Completion venograms left upper extremity.        Family History  Family History   Problem Relation Age of Onset    Colon Cancer Mother          at 63    Colon Polyps Mother     Lung Cancer Father          at 79    Colon Polyps Father     Stomach Cancer Sister     Breast Cancer Sister 27    Depression Daughter 25        committed suicide    Liver Cancer Neg Hx     Liver Disease Neg Hx     Esophageal Cancer Neg Hx     Rectal Cancer Neg Hx        Social History  Social History     Socioeconomic History    Marital status: Single     Spouse name: Not on file    Number of children: 2    Years of education: Not on file    Highest education level: Not on file   Occupational History    Not on file   Tobacco Use    Smoking status: Current Every Day Smoker     Packs/day: 0.50     Years: 33.00     Pack years: 16.50     Types: Cigarettes    Smokeless tobacco: Never Used    Tobacco comment: NOW at 1/4 PD, started at age 24 and has averaged 2 PPD   Vaping Use    Vaping Use: Never used   Substance and Sexual Activity    Alcohol use: No    Drug use: Not Currently     Types: Marijuana Jaqui Dinning)    Sexual activity: Not Currently     Partners: Male   Other Topics Concern    Not on file   Social History Narrative    Not on file     Social Determinants of Health Financial Resource Strain:     Difficulty of Paying Living Expenses: Not on file   Food Insecurity:     Worried About Running Out of Food in the Last Year: Not on file    Kendy of Food in the Last Year: Not on file   Transportation Needs:     Lack of Transportation (Medical): Not on file    Lack of Transportation (Non-Medical):  Not on file   Physical Activity:     Days of Exercise per Week: Not on file    Minutes of Exercise per Session: Not on file   Stress:     Feeling of Stress : Not on file   Social Connections:     Frequency of Communication with Friends and Family: Not on file    Frequency of Social Gatherings with Friends and Family: Not on file    Attends Religion Services: Not on file    Active Member of [address] [place] [address] [employer] or Organizations: Not on file    Attends Club or Organization Meetings: Not on file    Marital Status: Not on file   Intimate Partner Violence:     Fear of Current or Ex-Partner: Not on file    Emotionally Abused: Not on file    Physically Abused: Not on file    Sexually Abused: Not on file   Housing Stability:     Unable to Pay for Housing in the Last Year: Not on file    Number of [place] in the Last Year: Not on file    Unstable Housing in the Last Year: Not on file         Review of Systems:  Review of system is unobtainable as she is intubated and sedated.}       Objective:  Patient Vitals for the past 24 hrs:   BP Temp Temp src Pulse Resp SpO2 Height   04/26/22 0816       5' 6\" (1.676 m)   04/26/22 0745 (!) 166/55   72 (!) 6 100 %    04/26/22 0730 (!) 162/55   72 16 100 %    04/26/22 0715 (!) 165/60   72 16 100 %    04/26/22 0702 (!) 168/55   74 16 100 %    04/26/22 0645 (!) 174/56   75 16 100 %    04/26/22 0630 (!) 170/62   76 16 100 %    04/26/22 0618    77 16 100 %    04/26/22 0615 (!) 173/62   77 17 100 %    04/26/22 0600 (!) 181/57   79 16 100 %    04/26/22 0545 (!) 191/61   82 16 100 %    04/26/22 0530 (!) 202/63   84 16 100 %    04/26/22 0500 (!) 219/78   88 16 100 %    04/26/22 0400 (!) 152/70 98.7 °F (37.1 °C) Temporal 80 21 100 %    04/26/22 0300 (!) 155/68   83 16 100 %    04/26/22 0214    84 16 100 %    04/26/22 0200 (!) 145/63   85 16 100 %    04/26/22 0100 (!) 145/71   89 16 100 %    04/26/22 0000 (!) 149/69   93 16 100 %    04/25/22 2315 (!) 141/73   99 15 100 %    04/25/22 2300 (!) 177/79   98 19 100 %    04/25/22 2215 (!) 166/71   95 17 100 %    04/25/22 2210    99 23 100 %    04/25/22 2200 (!) 155/113   101 29 100 %    04/25/22 2130 (!) 153/75   97 17 100 %    04/25/22 2118     22 100 %    04/25/22 2100 (!) 184/85   99 21 100 %    04/25/22 2030 (!) 216/73   92 21 100 %    04/25/22 2000 (!) 171/79 98.7 °F (37.1 °C) Temporal 85 16 100 %    04/25/22 1900 (!) 171/66   84 16 100 %    04/25/22 1819    88 14 100 %    04/25/22 1800 (!) 160/63   88 15 100 %    04/25/22 1700 (!) 160/72   89 20 100 %    04/25/22 1600 (!) 149/74 98.3 °F (36.8 °C) Axillary 89 17 100 %    04/25/22 1500 (!) 162/70   92 21 100 %    04/25/22 1446    91 17 100 %    04/25/22 1400 (!) 156/69   92 18 100 %    04/25/22 1300    92 18 100 %    04/25/22 1200 (!) 146/71 98.7 °F (37.1 °C) Axillary 91 16 100 %    04/25/22 1100 (!) 154/64   93 14 100 %    04/25/22 1027    92 17     04/25/22 1000 (!) 164/72   94 16 100 %    04/25/22 0900 (!) 173/67   87 16 100 %        Intake/Output Summary (Last 24 hours) at 4/26/2022 0851  Last data filed at 4/26/2022 0743  Gross per 24 hour   Intake 1277.95 ml   Output 162 ml   Net 1115.95 ml     General: Intubated/sedated. HEENT: Normocephalic atraumatic head/orotracheal intubation  Neck: Supple without JVD or carotid bruits  Chest: Bilateral clear breath sounds. CVS: Regular rate and rhythm S1 and S2. Abdominal: Soft and nondistended positive bowel sounds. Extremities: No pedal edema.   Skin: warm and dry without rash    Labs:  BMP:   Recent Labs     04/25/22  0451 04/25/22  1041 04/26/22  0146   * 114* 131*   K 3.2* 4.6 3.7   CL 87* 78* 93*   CO2 23 20* 24   PHOS 2.6 2.7 3.6   BUN 65* 59* 31*   CREATININE 5.7* 5.1* 3.7*   CALCIUM 9.0 7.8* 8.6     CBC:   Recent Labs     04/24/22  1228 04/26/22  0146   WBC 8.5 5.8   HGB 11.3* 9.1*   HCT 36.4* 28.3*   MCV 92.6 91.3    121*     LIVER PROFILE:   Recent Labs     04/24/22  1228 04/26/22  0146   AST 19 13   ALT 15 9   BILITOT 0.7 0.4   ALKPHOS 134* 95     PT/INR: No results for input(s): PROTIME, INR in the last 72 hours. APTT: No results for input(s): APTT in the last 72 hours. BNP:  No results for input(s): BNP in the last 72 hours. Ionized Calcium:No results for input(s): IONCA in the last 72 hours. Magnesium:  Recent Labs     04/25/22  0451 04/25/22  1041 04/26/22  0146   MG 2.2 1.9 2.1     Phosphorus:  Recent Labs     04/25/22  0451 04/25/22  1041 04/26/22  0146   PHOS 2.6 2.7 3.6     HgbA1C:   Recent Labs     04/24/22  1228   LABA1C 9.7*     Hepatic:   Recent Labs     04/24/22  1228 04/26/22  0146   ALKPHOS 134* 95   ALT 15 9   AST 19 13   PROT 6.9 5.7*   BILITOT 0.7 0.4   LABALBU 4.0 2.6*     Lactic Acid: No results for input(s): LACTA in the last 72 hours. Troponin: No results for input(s): CKTOTAL, CKMB, TROPONINT in the last 72 hours. ABGs:   Lab Results   Component Value Date    PHART 7.330 04/24/2022    PO2ART 389.0 04/24/2022    SSF6PLA 41.0 04/24/2022     CRP:  No results for input(s): CRP in the last 72 hours. Sed Rate:  No results for input(s): SEDRATE in the last 72 hours. Culture Results:   Blood Culture Recent:   Recent Labs     04/24/22  1447   BC No Growth to date. Any change in status will be called.        Urine Culture Recent :   Recent Labs     04/24/22  1354   LABURIN No growth at 36-48 hours       Radiology reports as per the Radiologist  Radiology: CT Head WO Contrast    Result Date: 4/24/2022  CT HEAD WO CONTRAST 4/24/2022 2:01 PM HISTORY: Altered mental status COMPARISON: CT scan dated 11/18/2021 DOSE LENGTH PRODUCT: 766 mGy cm TECHNIQUE: Helical tomographic images of the brain were obtained without the use of intravenous contrast. Automated exposure control was also utilized to decrease patient radiation dose. FINDINGS: There is no evidence of evolving large vascular territory infarct. No visualized intra-axial or extra-axial hemorrhage. No mass lesion is identified. Normal size and configuration of the ventricular system. The basal cisterns are symmetric. Posterior fossa structures are unremarkable. The included orbits and their contents are unremarkable. Chronic paranasal sinus disease on the left. The visualized osseous structures and overlying soft tissues of the skull and face are unremarkable. 1. No acute intracranial process. Signed by Dr Yasmin Taylor    Result Date: 4/24/2022  XR CHEST PORTABLE 4/24/2022 4:27 PM HISTORY: ET tube, enteric tube  Technique: Single AP view of the chest COMPARISONS: 4/24/2022 FINDINGS: Endotracheal tube is identified with tip essentially touching the krysta. Enteric tube courses into the stomach with tip curled in the fundus back towards the gastroesophageal junction. The lungs are clear and well expanded. Heart size is stable. Pulmonary vasculature are nondilated. No pleural effusion or pneumothorax. 1. Endotracheal tube is deep, tip essentially touching the krysta. Lungs are clear and well expanded. I would recommend 2-3 cm withdrawal for a more optimal positioning. The results of this exam were discussed with Tobin Eddy (ICU nursing) on 4/24/2022 at 1640 hours Signed by Dr Celena Grullon    XR CHEST PORTABLE    Result Date: 4/24/2022  XR CHEST PORTABLE 4/24/2022 1:17 PM HISTORY: ET tube placement  Technique: Single AP view of the chest COMPARISONS: 4/24/2022 FINDINGS: Status post endotracheal intubation. ET tube is in good position. Lungs are well-expanded.  Enteric tube curls on itself in the midesophagus and then extends back up into the throat. Heart size is stable. Pulmonary vasculature are nondilated. 1. ET tube is in good position. Lungs are clear and well expanded. 2. Enteric tube curls on itself in the mid esophagus before extending back up into the throat. This will need repositioned. The results of this exam were discussed with Dr. Ignacio Troncoso on 4/24/2022 at 1329 hours Signed by Dr Aurora Benson    XR CHEST PORTABLE    Result Date: 4/24/2022  XR CHEST PORTABLE 4/24/2022 12:34 PM HISTORY: Altered mental status  Technique: Single AP view of the chest COMPARISONS: Chest exam dated 4/1/2022 FINDINGS: No lung consolidation. No pleural effusion or pneumothorax. Cardiomediastinal silhouette and pulmonary vasculature are unremarkable. No acute bony abnormality. Left subclavian region vascular stent with additional stent projecting over the left humerus. No acute cardiopulmonary process. Signed by Dr Lori Cm    Result Date: 4/1/2022  XR CHEST PORTABLE 4/1/2022 12:35 PM HISTORY: Shortness of breath  Technique: Single AP view of the chest COMPARISONS: Chest exam dated 1/22/2022 FINDINGS: Cardiomegaly with central pulmonary vascular congestion, interstitial and alveolar edema as well as bilateral moderate layering pleural effusions. No pneumothorax. No acute bony abnormality. 1. Volume overload with interstitial and carie pulmonary edema as well as bilateral moderate pleural effusions. Signed by Dr Tyrone Garcia   1. End-stage renal disease/currently seen on hemodialysis. 2.  Type II diabetic nephropathy. 3.  Acute respiratory failure/intubated. 4.  Status post grand mal seizure  5. Hyponatremia. 6.  Anemia of chronic kidney disease. 7.  Urinary tract infection. 8.  Pulmonary edema  9. Hypokalemia      Plan:  1. Tolerating hemodialysis well. 2.  MRI of the brain was done today, pending report. 3.  EEG monitoring.   4.  Continue IV antibiotics. 5.  Continue Keppra  6.   Plan was discussed with critical care nurse    Deep Swan MD  04/26/22  8:51 AM

## 2022-04-26 NOTE — PROCEDURES
DAILY ADULT INPATIENT VIDEO-EEG EVENT MONITORING REPORT    Patient:   John Pedersen  MR#:    [id number]  Room #:    INPATIENT   YOB: 1970  Study Date:    April 25, 2022  Primary Physician:     ROMINA Ponce   Referring Physician:   Nicho Clancy DO      CLINICAL INFORMATION:     This patient is a 47 y.o. female with a history of seizures. The specific reason we are doing this study is for event clarification and to evaluate for an underlying seizure focus and to exclude subclinical status epilepticus. MEDICATIONS:     See MAR    RECORDING CONDITIONS:     Continuous video-EEG monitoring was performed with [employer] equiptment. The recorded period occurred on April 25, 2022. Electrodes were applied according to the International 10-20 System. Data were obtained, stored, and interpreted according to ACNS guidelines (J Clin Neurophysiol 2006;23(2):) utilizing referential montage recording, with reformatting to longitudinal, transverse bipolar, and referential montages as necessary for interpretation, along with digital/automated EEG analysis. Physician access and review of the EEG and Video data occurred actively during the recording. RECORDING DURATION:   24 hours. DESCRIPTION OF BASELINE RECORD:    The background is somewhat asymmetric with low voltage slowing seen over the left hemisphere. Intermixed burst suppression is noted throughout the recording which is likely due to underlying propofol use. No overt electrographic seizure activity was noted. No overt epileptiform abnormalities were seen. Muscle, motion, and eye movement artifacts were occasionally noted. DESCRIPTION OF EVENTS:    April 25, 2022  The Interictal files were as described in the baseline recording. Digital spike and event analysis reviewed and showed background asymmetry with low voltage slowing seen over the left hemisphere. Intermixed burst pressure was noted throughout the recording.   No seizures were recorded. E.E.G. INTERPRETATION:    Abnormal EEG due to background asymmetry with lower voltage slowing seen over the left hemisphere. Intermixed burst suppression was noted throughout the recording which is likely secondary to propofol. No overt electrographic seizure activity was seen. CLINICAL CORRELATION:    Continue cEEG monitoring.          Brie Jackson DO  Board Certified Neurologist

## 2022-04-26 NOTE — PLAN OF CARE
Nutrition Problem #1: Inadequate oral intake  Intervention: Food and/or Nutrient Delivery: Start Tube Feeding  Nutritional

## 2022-04-27 LAB
ALBUMIN SERPL-MCNC: 2.9 G/DL (ref 3.5–5.2)
ALP BLD-CCNC: 98 U/L (ref 35–104)
ALT SERPL-CCNC: 9 U/L (ref 5–33)
ANION GAP SERPL CALCULATED.3IONS-SCNC: 13 MMOL/L (ref 7–19)
AST SERPL-CCNC: 14 U/L (ref 5–32)
BILIRUB SERPL-MCNC: 0.4 MG/DL (ref 0.2–1.2)
BUN BLDV-MCNC: 26 MG/DL (ref 6–20)
CALCIUM SERPL-MCNC: 8.5 MG/DL (ref 8.6–10)
CHLORIDE BLD-SCNC: 88 MMOL/L (ref 98–111)
CO2: 25 MMOL/L (ref 22–29)
CREAT SERPL-MCNC: 2.8 MG/DL (ref 0.5–0.9)
GFR AFRICAN AMERICAN: 21
GFR NON-AFRICAN AMERICAN: 18
GLUCOSE BLD-MCNC: 151 MG/DL (ref 70–99)
GLUCOSE BLD-MCNC: 173 MG/DL (ref 70–99)
GLUCOSE BLD-MCNC: 184 MG/DL (ref 70–99)
GLUCOSE BLD-MCNC: 198 MG/DL (ref 74–109)
GLUCOSE BLD-MCNC: 255 MG/DL (ref 70–99)
HCT VFR BLD CALC: 30.8 % (ref 37–47)
HEMOGLOBIN: 9.7 G/DL (ref 12–16)
MAGNESIUM: 1.9 MG/DL (ref 1.6–2.6)
MCH RBC QN AUTO: 28.8 PG (ref 27–31)
MCHC RBC AUTO-ENTMCNC: 31.5 G/DL (ref 33–37)
MCV RBC AUTO: 91.4 FL (ref 81–99)
PDW BLD-RTO: 19.5 % (ref 11.5–14.5)
PERFORMED ON: ABNORMAL
PHOSPHORUS: 3.8 MG/DL (ref 2.5–4.5)
PLATELET # BLD: 114 K/UL (ref 130–400)
PMV BLD AUTO: 10.1 FL (ref 9.4–12.3)
POTASSIUM SERPL-SCNC: 3.4 MMOL/L (ref 3.5–5)
RBC # BLD: 3.37 M/UL (ref 4.2–5.4)
SODIUM BLD-SCNC: 126 MMOL/L (ref 136–145)
TOTAL PROTEIN: 5.8 G/DL (ref 6.6–8.7)
WBC # BLD: 6.2 K/UL (ref 4.8–10.8)

## 2022-04-27 PROCEDURE — 85027 COMPLETE CBC AUTOMATED: CPT

## 2022-04-27 PROCEDURE — 6370000000 HC RX 637 (ALT 250 FOR IP): Performed by: INTERNAL MEDICINE

## 2022-04-27 PROCEDURE — 94003 VENT MGMT INPAT SUBQ DAY: CPT

## 2022-04-27 PROCEDURE — 95720 EEG PHY/QHP EA INCR W/VEEG: CPT | Performed by: PSYCHIATRY & NEUROLOGY

## 2022-04-27 PROCEDURE — 6360000002 HC RX W HCPCS: Performed by: INTERNAL MEDICINE

## 2022-04-27 PROCEDURE — 99233 SBSQ HOSP IP/OBS HIGH 50: CPT | Performed by: PSYCHIATRY & NEUROLOGY

## 2022-04-27 PROCEDURE — 2580000003 HC RX 258: Performed by: INTERNAL MEDICINE

## 2022-04-27 PROCEDURE — 2500000003 HC RX 250 WO HCPCS: Performed by: INTERNAL MEDICINE

## 2022-04-27 PROCEDURE — 95714 VEEG EA 12-26 HR UNMNTR: CPT

## 2022-04-27 PROCEDURE — 36415 COLL VENOUS BLD VENIPUNCTURE: CPT

## 2022-04-27 PROCEDURE — 80053 COMPREHEN METABOLIC PANEL: CPT

## 2022-04-27 PROCEDURE — 82947 ASSAY GLUCOSE BLOOD QUANT: CPT

## 2022-04-27 PROCEDURE — 83735 ASSAY OF MAGNESIUM: CPT

## 2022-04-27 PROCEDURE — 2700000000 HC OXYGEN THERAPY PER DAY

## 2022-04-27 PROCEDURE — 84100 ASSAY OF PHOSPHORUS: CPT

## 2022-04-27 PROCEDURE — 2100000000 HC CCU R&B

## 2022-04-27 PROCEDURE — 99232 SBSQ HOSP IP/OBS MODERATE 35: CPT

## 2022-04-27 RX ADMIN — CHLORHEXIDINE GLUCONATE 15 ML: 1.2 RINSE ORAL at 07:53

## 2022-04-27 RX ADMIN — VANCOMYCIN HYDROCHLORIDE 125 MG: 1 INJECTION, POWDER, LYOPHILIZED, FOR SOLUTION INTRAVENOUS at 00:08

## 2022-04-27 RX ADMIN — INSULIN LISPRO 2 UNITS: 100 INJECTION, SOLUTION INTRAVENOUS; SUBCUTANEOUS at 12:21

## 2022-04-27 RX ADMIN — VANCOMYCIN HYDROCHLORIDE 125 MG: 1 INJECTION, POWDER, LYOPHILIZED, FOR SOLUTION INTRAVENOUS at 06:06

## 2022-04-27 RX ADMIN — HYDRALAZINE HYDROCHLORIDE 100 MG: 50 TABLET, FILM COATED ORAL at 12:21

## 2022-04-27 RX ADMIN — Medication 10 ML: at 20:14

## 2022-04-27 RX ADMIN — HYDRALAZINE HYDROCHLORIDE 100 MG: 50 TABLET, FILM COATED ORAL at 06:06

## 2022-04-27 RX ADMIN — VANCOMYCIN HYDROCHLORIDE 125 MG: 1 INJECTION, POWDER, LYOPHILIZED, FOR SOLUTION INTRAVENOUS at 17:02

## 2022-04-27 RX ADMIN — CHLORHEXIDINE GLUCONATE 15 ML: 1.2 RINSE ORAL at 20:14

## 2022-04-27 RX ADMIN — LEVETIRACETAM 500 MG: 5 INJECTION INTRAVENOUS at 01:14

## 2022-04-27 RX ADMIN — HEPARIN SODIUM 5000 UNITS: 5000 INJECTION INTRAVENOUS; SUBCUTANEOUS at 21:15

## 2022-04-27 RX ADMIN — VANCOMYCIN HYDROCHLORIDE 125 MG: 1 INJECTION, POWDER, LYOPHILIZED, FOR SOLUTION INTRAVENOUS at 12:21

## 2022-04-27 RX ADMIN — ANORECTAL OINTMENT: 15.7; .44; 24; 20.6 OINTMENT TOPICAL at 17:02

## 2022-04-27 RX ADMIN — DEXTROSE MONOHYDRATE 10 MG/HR: 50 INJECTION, SOLUTION INTRAVENOUS at 01:50

## 2022-04-27 RX ADMIN — INSULIN LISPRO 1 UNITS: 100 INJECTION, SOLUTION INTRAVENOUS; SUBCUTANEOUS at 21:15

## 2022-04-27 RX ADMIN — HEPARIN SODIUM 5000 UNITS: 5000 INJECTION INTRAVENOUS; SUBCUTANEOUS at 07:53

## 2022-04-27 RX ADMIN — SODIUM CHLORIDE, PRESERVATIVE FREE 20 MG: 5 INJECTION INTRAVENOUS at 07:53

## 2022-04-27 RX ADMIN — HYDRALAZINE HYDROCHLORIDE 100 MG: 50 TABLET, FILM COATED ORAL at 21:15

## 2022-04-27 RX ADMIN — PROPOFOL 30 MCG/KG/MIN: 10 INJECTION, EMULSION INTRAVENOUS at 03:15

## 2022-04-27 RX ADMIN — INSULIN LISPRO 6 UNITS: 100 INJECTION, SOLUTION INTRAVENOUS; SUBCUTANEOUS at 07:53

## 2022-04-27 RX ADMIN — INSULIN LISPRO 2 UNITS: 100 INJECTION, SOLUTION INTRAVENOUS; SUBCUTANEOUS at 17:02

## 2022-04-27 RX ADMIN — DEXTROSE MONOHYDRATE 7.5 MG/HR: 50 INJECTION, SOLUTION INTRAVENOUS at 20:15

## 2022-04-27 RX ADMIN — DEXTROSE MONOHYDRATE 10 MG/HR: 50 INJECTION, SOLUTION INTRAVENOUS at 04:42

## 2022-04-27 RX ADMIN — HEPARIN SODIUM 5000 UNITS: 5000 INJECTION INTRAVENOUS; SUBCUTANEOUS at 15:40

## 2022-04-27 RX ADMIN — LEVETIRACETAM 500 MG: 5 INJECTION INTRAVENOUS at 12:24

## 2022-04-27 ASSESSMENT — PULMONARY FUNCTION TESTS
PIF_VALUE: 37
PIF_VALUE: 54
PIF_VALUE: 32
PIF_VALUE: 30
PIF_VALUE: 25
PIF_VALUE: 27
PIF_VALUE: 33
PIF_VALUE: 29
PIF_VALUE: 21
PIF_VALUE: 28
PIF_VALUE: 42
PIF_VALUE: 25
PIF_VALUE: 25
PIF_VALUE: 65
PIF_VALUE: 30
PIF_VALUE: 23
PIF_VALUE: 31
PIF_VALUE: 33
PIF_VALUE: 35

## 2022-04-27 ASSESSMENT — PAIN SCALES - GENERAL
PAINLEVEL_OUTOF10: 0
PAINLEVEL_OUTOF10: 0
PAINLEVEL_OUTOF10: 1

## 2022-04-27 NOTE — PROGRESS NOTES
Palliative Care Progress Note  4/27/2022 8:37 AM    Patient:  Capri Abraham  YOB: 1970  Primary Care Physician: ROMINA Mcpherson  Advance Directive: Full Code  Admit Date: 4/24/2022       Hospital Day: 3  Portions of this note have been copied forward, however, changed to reflect the most current clinical status of this patient. CHIEF COMPLAINT/REASON FOR CONSULTATION goals of care    SUBJECTIVE:  Ms. Jovita Allen remains intubated now with sedation off in CCU. Review of Systems:   14 point review of systems is negative except as specifically addressed above. Objective:   VITALS:  BP (!) 137/57   Pulse 81   Temp 97.6 °F (36.4 °C) (Temporal)   Resp 18   Ht 5' 6\" (1.676 m)   Wt 144 lb 11.2 oz (65.6 kg)   SpO2 100%   BMI 23.36 kg/m²   24HR INTAKE/OUTPUT:      Intake/Output Summary (Last 24 hours) at 4/27/2022 9298  Last data filed at 4/27/2022 0600  Gross per 24 hour   Intake 2822.88 ml   Output 3915 ml   Net -1092.12 ml       General appearance: 46 yo female, chronically ill appearing, intubated  Head: Normocephalic, without obvious abnormality, atraumatic  Eyes: conjunctivae/corneas clear. PERRL  Ears: normal external ears and nose  Neck: no JVD, supple, symmetrical, trachea midline   Lungs: diminished to auscultation bilaterally, mechanically ventilated, equal chest rise  Heart: regular rate and rhythm, S1, S2 normal, no murmur  Abdomen: soft, non-tender, rounded, normal bowel sounds, FMS in place with diarrhea noted  Extremities: No lower extremity edema,  No erythema, no tenderness to palpation, pedal pulses present  Skin: Pale, warm, dry  Neurologic: Intubated, minimally responsive without sedation. Attempts to open eyes to voice, withdrawals extremities to pain but does notfollowing commands. Generalized weakness, normal tone.      Medications:      niCARdipine 10 mg/hr (04/27/22 0600)    dextrose      sodium chloride      propofol Stopped (04/27/22 0559)      insulin lispro 0-12 Units SubCUTAneous TID     insulin lispro  0-6 Units SubCUTAneous Nightly    vancomycin  125 mg Oral 4 times per day    hydrALAZINE  100 mg Oral 3 times per day    propofol  5-50 mcg/kg/min IntraVENous Once    sodium chloride flush  5-40 mL IntraVENous 2 times per day    heparin (porcine)  5,000 Units SubCUTAneous TID    levetiracetam  500 mg IntraVENous Q12H    chlorhexidine  15 mL Mouth/Throat BID    famotidine (PEPCID) injection  20 mg IntraVENous Daily     sodium phosphate IVPB **OR** sodium phosphate IVPB, glucagon (rDNA), dextrose, glucose, dextrose bolus (hypoglycemia) **OR** dextrose bolus (hypoglycemia), sodium chloride flush, sodium chloride, LORazepam, ondansetron **OR** ondansetron, polyethylene glycol, acetaminophen **OR** acetaminophen, magic butt cream, magnesium sulfate, potassium alternative oral replacement **OR** potassium chloride  Diet NPO  ADULT TUBE FEEDING; Orogastric; Peptide Based High Protein; Continuous; 15; Yes; 5; Q 6 hours; 34; 25; Q 1 hour; Protein; 1 Proteinex as tf flush daily     Lab and other Data:     Recent Labs     04/24/22  1228 04/26/22  0146 04/27/22  0241   WBC 8.5 5.8 6.2   HGB 11.3* 9.1* 9.7*    121* 114*     Recent Labs     04/25/22  1041 04/26/22  0146 04/27/22  0241   * 131* 126*   K 4.6 3.7 3.4*   CL 78* 93* 88*   CO2 20* 24 25   BUN 59* 31* 26*   CREATININE 5.1* 3.7* 2.8*   GLUCOSE 405* 123* 198*     Recent Labs     04/24/22  1228 04/26/22  0146 04/27/22  0241   AST 19 13 14   ALT 15 9 9   BILITOT 0.7 0.4 0.4   ALKPHOS 134* 95 98       RAD:   CT Head WO Contrast  Result Date: 4/24/2022  1. No acute intracranial process. Signed by Dr Brooklyn Velasquez    XR CHEST PORTABLE  Result Date: 4/24/2022  1. Endotracheal tube is deep, tip essentially touching the krysta. Lungs are clear and well expanded. I would recommend 2-3 cm withdrawal for a more optimal positioning.  The results of this exam were discussed with Larry Charlton (ICU nursing) on 4/24/2022 at 1640 hours Signed by Dr Meg Rodas    XR CHEST PORTABLE  Result Date: 4/24/2022  1. ET tube is in good position. Lungs are clear and well expanded. 2. Enteric tube curls on itself in the mid esophagus before extending back up into the throat. This will need repositioned. The results of this exam were discussed with Dr. Carrie Bloom on 4/24/2022 at 1329 hours Signed by Dr Meg Rodas    XR CHEST PORTABLE  Result Date: 4/24/2022  No acute cardiopulmonary process. Signed by Dr Meg Rodas    Assessment/Plan   Principal Problem:    Seizure [facility])  Active Problems:    ESRD (end stage renal disease) on dialysis ([facility] [address]    Hyponatremia    Uncontrolled hypertension    Palliative care patient    Uncontrolled type 2 diabetes mellitus with hyperosmolar nonketotic hyperglycemia ([facility] [address]    Acute hypoxemic respiratory failure ([facility] [address]    UTI (urinary tract infection)  Resolved Problems:    * No resolved hospital problems. *      Visit Summary:  Chart reviewed, patient discussed with nursing staff. Reviewed health issues, work up and treatment plan as well as factors that lead to hospitalization. Ms. Gabino Simeon is seen at bedside this morning with no acute events overnight. She is now off sedation and  Remains minimally responsive. No overt seizure activity overnight on cEEG per neuro. Continues with cardene infusion for BP. I called and spoke with her significant other, Audrie Dubin, to update on status and plan of care. He tells me he was unaware patient had Cdiff and is concerned since they live together. We discussed s/s of Cdiff infection and I encouraged him to seek out care from his PCP if he began experiencing diarrhea. I explained that her neuro status has not improved. We discussed that she will have dialysis again tomorrow. He is concerned that she has been unable to wean from vent and asks when they will try again. I provided education SBT. We discussed code status and pts decreased quality of life at home.  He still wishes for pt to remain full code at this time. Opportunity for questions and emotional support provided. Will follow. Recommendations:   1. Palliative care- GOC continue all medical treatments and monitor for improvement. D/c planning based on hospital course. Code Status- FULL CODE    2. Acute hypoxemic respiratory failure- mgmt per hospitalist.  Vent support. Wean as tolerated. Failed SBT today    3. Hyperglycemia-improved, insulin infusion transitioned to subcu. 4.  S/p tonic-clonic seizure- neuro following. Keppra continued. Continuous EEG monitoring remains. MRI brain when able    5. UTI- IV antibiotics continue. Follow cultures    6. ESRD on maintenance dialysis- nephrology following. Labs noted. Plan for dialysis tomorrow. 7. HTN- mgmt per hospitalist. cardene infusion. 8. Hyponatremia- labs improving. 9. Cdiff- FMS in place with diarrhea. Oral vanc per OG    Thank you for consulting Palliative Care and allowing us to participate in the care of this patient.    Time Spent Counseling > 50%:  YES                                   Total Time Spent with patient/family counseling, workup/treatment review, counseling and placement of orders/preparation of this note: 34 minutes    Electronically signed by ROMINA Berry CNP on 4/27/2022 at 8:37 AM    (Please note that portions of this note were completed with a voice recognition program.  Bhumika Fink made to edit the dictations but occasionally words are mis-transcribed.)

## 2022-04-27 NOTE — PROGRESS NOTES
[facility] Neurology Progress Note      Patient:   John Pedersen  MR#:    [id number]   Room:    0702/702-01   YOB: 1970  Date of Progress Note: 4/27/2022  Time of Note                           9:14 AM  Consulting Physician:  Yola Benson DO  Attending Physician:  Kimberly Espitia MD      INTERVAL HISTORY:  No acute events, remains intubated, off propofol, no seizures overnight, is a little more responsive. REVIEW OF SYSTEMS:  Unable to obtain     PHYSICAL EXAM:    Constitutional    BP (!) 148/57   Pulse 82   Temp 97.6 °F (36.4 °C) (Temporal)   Resp 16   Ht 5' 6\" (1.676 m)   Wt 144 lb 11.2 oz (65.6 kg)   SpO2 100%   BMI 23.36 kg/m²   General appearance: Intubated, sedated   EYES -   Conjunctiva normal  Pupillary exam as below, see CN exam in the neurologic exam  ENT-    No scars, masses, or lesions over external nose or ears  Oropharynx - intubated  Cardiovascular -   No clubbing, cyanosis  Pulmonary-   Mechanically ventilated   Musculoskeletal    No significant wasting of muscles noted  Gait as below, see gait exam in the neurologic exam  Muscle strength, tone, stability as below see the motor exam in the neurologic exam.   No bony deformities  Skin    Warm, dry, and intact to inspection and palpation. No rash, erythema, or pallor        NEUROLOGICAL EXAM     Mental status    []? Awake, alert, oriented   []? Affect attention and concentration appear appropriate  []? Recent and remote memory appears unremarkable  []? Speech normal without dysarthria or aphasia, comprehension and repetition intact. COMMENTS:  Unresponsive to voice, not following commands, does appear to localize to pain   Cranial Nerves []? No VF deficit to confrontation,  optic discs normal, no papilledema on fundoscopic exam.  []? PERRLA, EOMI, no nystagmus, conjugate eye movements, no ptosis  []? Face symmetric  []? Facial sensation intact  []? Tongue midline no atrophy or fasciculations present  []?  Palate midline, hearing to finger rub normal  []? Shoulder shrug and SCM testing normal  COMMENTS:  PERRL, Face appears sym, OCR present   Motor   []? 5/5 strength x 4 extremities  [x]? Normal bulk and tone  [x]? No tremor present  []? No rigidity or bradykinesia noted  COMMENTS: Withdrawal x 4   Sensory  []? Sensation intact to light touch, pin prick, vibration, and proprioception BLE  []? Sensation intact to light touch, pin prick, vibration, and proprioception BUE  COMMENTS: Limited    Coordination []? FTN normal bilaterally   []? HTS normal bilaterally  []? SANFORD normal.   COMMENTS: Limited    Reflexes  [x]? Symmetric and non-pathological  [x]? Toes downgoing bilaterally  [x]? No clonus present  COMMENTS:   Gait                  []? Normal steady gait    []? Ataxic    []? Spastic     []? Magnetic     []? Shuffling  [x]? Not assessed  COMMENTS:        LABS/IMAGING:    CT Head WO Contrast    Result Date: 4/24/2022  CT HEAD WO CONTRAST 4/24/2022 2:01 PM HISTORY: Altered mental status COMPARISON: CT scan dated 11/18/2021 DOSE LENGTH PRODUCT: 766 mGy cm TECHNIQUE: Helical tomographic images of the brain were obtained without the use of intravenous contrast. Automated exposure control was also utilized to decrease patient radiation dose. FINDINGS: There is no evidence of evolving large vascular territory infarct. No visualized intra-axial or extra-axial hemorrhage. No mass lesion is identified. Normal size and configuration of the ventricular system. The basal cisterns are symmetric. Posterior fossa structures are unremarkable. The included orbits and their contents are unremarkable. Chronic paranasal sinus disease on the left. The visualized osseous structures and overlying soft tissues of the skull and face are unremarkable. 1. No acute intracranial process.  Signed by Dr Tasneem Henley    Result Date: 4/24/2022  XR CHEST PORTABLE 4/24/2022 4:27 PM HISTORY: ET tube, enteric tube  Technique: Single AP view of the chest COMPARISONS: 4/24/2022 FINDINGS: Endotracheal tube is identified with tip essentially touching the krysta. Enteric tube courses into the stomach with tip curled in the fundus back towards the gastroesophageal junction. The lungs are clear and well expanded. Heart size is stable. Pulmonary vasculature are nondilated. No pleural effusion or pneumothorax. 1. Endotracheal tube is deep, tip essentially touching the krysta. Lungs are clear and well expanded. I would recommend 2-3 cm withdrawal for a more optimal positioning. The results of this exam were discussed with Jerica Aquino (ICU nursing) on 4/24/2022 at 1640 hours Signed by Dr Juju Fierro    XR CHEST PORTABLE    Result Date: 4/24/2022  XR CHEST PORTABLE 4/24/2022 1:17 PM HISTORY: ET tube placement  Technique: Single AP view of the chest COMPARISONS: 4/24/2022 FINDINGS: Status post endotracheal intubation. ET tube is in good position. Lungs are well-expanded. Enteric tube curls on itself in the midesophagus and then extends back up into the throat. Heart size is stable. Pulmonary vasculature are nondilated. 1. ET tube is in good position. Lungs are clear and well expanded. 2. Enteric tube curls on itself in the mid esophagus before extending back up into the throat. This will need repositioned. The results of this exam were discussed with Dr. Cira Steve on 4/24/2022 at 1329 hours Signed by Dr Juju Fierro    XR CHEST PORTABLE    Result Date: 4/24/2022  XR CHEST PORTABLE 4/24/2022 12:34 PM HISTORY: Altered mental status  Technique: Single AP view of the chest COMPARISONS: Chest exam dated 4/1/2022 FINDINGS: No lung consolidation. No pleural effusion or pneumothorax. Cardiomediastinal silhouette and pulmonary vasculature are unremarkable. No acute bony abnormality. Left subclavian region vascular stent with additional stent projecting over the left humerus. No acute cardiopulmonary process.  Signed by Dr Juju Fierro      Lab Results   Component Value Date    WBC 6.2 04/27/2022    HGB 9.7 (L) 04/27/2022    HCT 30.8 (L) 04/27/2022    MCV 91.4 04/27/2022     (L) 04/27/2022     Lab Results   Component Value Date     (L) 04/27/2022    K 3.4 (L) 04/27/2022    CL 88 (L) 04/27/2022    CO2 25 04/27/2022    BUN 26 (H) 04/27/2022    CREATININE 2.8 (H) 04/27/2022    GLUCOSE 198 (H) 04/27/2022    CALCIUM 8.5 (L) 04/27/2022    PROT 5.8 (L) 04/27/2022    LABALBU 2.9 (L) 04/27/2022    BILITOT 0.4 04/27/2022    ALKPHOS 98 04/27/2022    AST 14 04/27/2022    ALT 9 04/27/2022    LABGLOM 18 (A) 04/27/2022    GFRAA 21 (L) 04/27/2022     Lab Results   Component Value Date    INR 0.96 01/01/2022    INR 0.97 11/18/2021    INR 1.06 05/29/2021    PROTIME 13.0 01/01/2022    PROTIME 13.1 11/18/2021    PROTIME 13.7 05/29/2021       RECORD REVIEW:   Previous medical records, medications were reviewed at today's visit. Nursing/physician notes, imaging, labs and vitals reviewed. ASSESSMENT:  47 y.o. admitted after an episode of unresponsiveness followed by generalized tonic-clonic event in the emergency department. She had profoundly elevated glucose possible hyperglycemic coma, hyponatremia, underlying UTI,  end-stage renal disease with a history of noncompliance noted as well. Head CT nothing acute. No seizure noted on cEEG overnight. Exam largely unchanged, now off sedation.      PLAN:  1. MRI brain when able  2. Continue cEEG monitoring  3. Continue Keppra, off propofol   4. Continue addressing blood glucose, hyponatremia, UTI, ESRD nephrology following    Please feel free to call with any questions. [phone] (cell phone).       Kaleigh Steven DO  Board Certified Neurology

## 2022-04-27 NOTE — PLAN OF CARE
Nutrition Problem #1: Inadequate oral intake  Intervention: Food and/or Nutrient Delivery: Modify Tube Feeding  Nutritional

## 2022-04-27 NOTE — PROCEDURES
DAILY ADULT INPATIENT VIDEO-EEG EVENT MONITORING REPORT    Patient:   Ventura Morris  MR#:    [id number]  Room #:    INPATIENT   YOB: 1970  Study Date:    April 26, 2022  Primary Physician:     ROMINA Dorman   Referring Physician:   Araceli Mcdonald DO      CLINICAL INFORMATION:     This patient is a 47 y.o. female with a history of seizures. The specific reason we are doing this study is for event clarification and to evaluate for an underlying seizure focus and to exclude subclinical status epilepticus. MEDICATIONS:     See MAR    RECORDING CONDITIONS:     Continuous video-EEG monitoring was performed with [employer] equiptment. The recorded period occurred on April 26, 2022. Electrodes were applied according to the International 10-20 System. Data were obtained, stored, and interpreted according to ACNS guidelines (J Clin Neurophysiol 2006;23(2):) utilizing referential montage recording, with reformatting to longitudinal, transverse bipolar, and referential montages as necessary for interpretation, along with digital/automated EEG analysis. Physician access and review of the EEG and Video data occurred actively during the recording. RECORDING DURATION:   24 hours. DESCRIPTION OF BASELINE RECORD:    The background is somewhat asymmetric with low voltage slowing seen over the left hemisphere. Intermixed burst suppression is noted throughout the recording which is likely due to underlying propofol use. No overt electrographic seizure activity was noted. No overt epileptiform abnormalities were seen. Muscle, motion, and eye movement artifacts were occasionally noted. DESCRIPTION OF EVENTS:    April 26, 2022  The Interictal files were as described in the baseline recording. Digital spike and event analysis reviewed and showed background asymmetry with lower voltage slowing seen over the left hemisphere. No seizures were recorded.        E.E.G. INTERPRETATION:    Abnormal EEG due to background asymmetry with lower voltage slowing seen over the left hemisphere. No overt electrographic seizure activity was seen. CLINICAL CORRELATION:    Continue cEEG monitoring.          Meeta Bass DO  Board Certified Neurologist

## 2022-04-27 NOTE — PROGRESS NOTES
TO LEFT AXILLARY VEIN WITH  INTERPOSITIONAL ARTEGRAFT   performed by Katina Banda MD at [address]  2012    [address] Right 1/25/2019    INSERTION OF RIGHT INTERNAL JUGULAR HEMODIALYSIS CATHETER performed by Katina Banda MD at [address] [facility]  08/17/2018    1.  Moderately increased stainable iron identified by special stain in Kupffer cells and occasional hepatocytes. Negative for evidence of significant portal or lobular inflammation. Negative for evidence of significant fibrosis    [place] COLONOSCOPY W/BIOPSY SINGLE/MULTIPLE N/A 10/20/2017    Dr Blaise Gupta prep-Tubular AP (-) dysplasia x 2--3 yr recall    [place] EGD TRANSORAL BIOPSY SINGLE/MULTIPLE N/A 12/27/2016    Dr Diann Valentin EGD TRANSORAL BIOPSY SINGLE/MULTIPLE N/A 10/20/2017    Dr Xu Valderrama (-)   [address] [facility] VASCULAR SURGERY Left 2016    fistula by Dr Yeimi Reagan at [address]  10/02/2017    [facility]. Left upper fistulograms/venograms, balloon angioplasty cephalic vein arch [id number] conquest.    VASCULAR SURGERY  08/2018    FISTULOGRAM    VASCULAR SURGERY  10/29/2018    [facility]. Left upper fistulograms/venograms    VASCULAR SURGERY  12/19/2018    [facility]. Arch aortogram,left upper arteriograms.  VASCULAR SURGERY  03/06/2019    [facility]. Removal of tunneled dialyis catheter right internal jugular vein.  VASCULAR SURGERY  08/28/2019    [facility]. Left upper extremity fistulogram including venography to the superior vena cava. Balloon angioplasty left subclavian vein with 10mm x 40mm conquest balloon. Balloon angioplasty mid/proximal upper arm cephalic vein with 30TK x 40mm conquest balloon. Venograms after balloon angioplasty. Stent left mid/proximal upper arm cephalic vein stenosis fluency 10mm x 60mm self-expanding covered stent. Balloon     VASCULAR SURGERY  08/28/2019    cont angioplasty stent with 10mm x 40mm conquest balloon. Completion venograms left upper extremity.        FAMILY HISTORY    Family History   Problem Relation Age of Onset    Colon Cancer Mother          at 63    Colon Polyps Mother     Lung Cancer Father          at 79    Colon Polyps Father     Stomach Cancer Sister     Breast Cancer Sister 27    Depression Daughter 25        committed suicide    Liver Cancer Neg Hx     Liver Disease Neg Hx     Esophageal Cancer Neg Hx     Rectal Cancer Neg Hx        SOCIAL HISTORY    Social History     Tobacco Use    Smoking status: Current Every Day Smoker     Packs/day: 0.50     Years: 33.00     Pack years: 16.50     Types: Cigarettes    Smokeless tobacco: Never Used    Tobacco comment: NOW at 1/4 PD, started at age 24 and has averaged 2 PPD   Vaping Use    Vaping Use: Never used   Substance Use Topics    Alcohol use: No    Drug use: Not Currently     Types: Marijuana (Weed)       ALLERGIES    Allergies   Allergen Reactions    Penicillins Hives and Shortness Of Breath    Lamisil Advanced [Terbinafine] Nausea And Vomiting    Reglan [Metoclopramide] Other (See Comments)     Body twitching and crawling out of her skin feeling    Stadol [Butorphanol] Nausea And Vomiting     And made me feel crazy       MEDICATIONS    No current facility-administered medications on file prior to encounter.      Current Outpatient Medications on File Prior to Encounter   Medication Sig Dispense Refill    DULoxetine (CYMBALTA) 60 MG extended release capsule Take 1 capsule by mouth daily Hold Cymbalta while taking Zyvox Antibiotic 30 capsule 0    insulin glargine (BASAGLAR KWIKPEN) 100 UNIT/ML injection pen Inject 5 Units into the skin nightly Indications: Diabetes 1 pen 0    insulin aspart (NOVOLOG FLEXPEN) 100 UNIT/ML injection pen Inject 4 Units into the skin 3 times daily (before meals) Indications: Diabetes 5 pen 0    lactulose (CHRONULAC) 10 GM/15ML solution Take 15 g by mouth 3 times daily With meals for constipation, hold for loose stools      cloNIDine (CATAPRES) 0.1 MG/24HR PTWK Place 1 patch onto the skin once a week 4 patch 0    nicotine (NICODERM CQ) 14 MG/24HR Place 1 patch onto the skin daily 30 patch 3    isosorbide mononitrate (IMDUR) 120 MG extended release tablet Take 1 tablet by mouth daily 30 tablet 3    hydrALAZINE (APRESOLINE) 100 MG tablet Take 1 tablet by mouth every 8 hours 90 tablet 3    metoprolol succinate (TOPROL XL) 100 MG extended release tablet Take 1 tablet by mouth every morning (before breakfast) (Patient taking differently: Take 150 mg by mouth every morning (before breakfast) ) 30 tablet 3    NIFEdipine (PROCARDIA XL) 30 MG extended release tablet Take 3 tablets by mouth daily 30 tablet 3    Ergocalciferol (VITAMIN D2) 10 MCG (400 UNIT) TABS Take 1.25 mg by mouth every 14 days      senna (SENNA-LAX) 8.6 MG tablet Take 1 tablet by mouth daily      sevelamer (RENVELA) 800 MG tablet Take 1 tablet by mouth 3 times daily (with meals)      albuterol sulfate  (90 Base) MCG/ACT inhaler Inhale 2 puffs into the lungs every 4 hours as needed      levothyroxine (SYNTHROID) 150 MCG tablet Take 150 mcg by mouth Daily      rOPINIRole (REQUIP) 2 MG tablet Take 4 mg by mouth nightly Indications: Restless Leg Syndrome       simvastatin (ZOCOR) 40 MG tablet Take 40 mg by mouth nightly          Objective    BP (!) 158/69   Pulse 82   Temp 98.1 °F (36.7 °C) (Temporal)   Resp 14   Ht 5' 6\" (1.676 m)   Wt 144 lb 11.2 oz (65.6 kg)   SpO2 98%   BMI 23.36 kg/m²     LABS:  WBC:    Lab Results   Component Value Date    WBC 6.2 04/27/2022     H/H:    Lab Results   Component Value Date    HGB 9.7 04/27/2022    HCT 30.8 04/27/2022     PTT:    Lab Results   Component Value Date    APTT 29.1 01/01/2022   [APTT}  PT/INR:    Lab Results   Component Value Date    PROTIME 13.0 01/01/2022    INR 0.96 01/01/2022     HgBA1c:    Lab Results   Component Value Date    LABA1C 9.7 04/24/2022       Assessment   Marek Risk Score: Marek Scale Score: 10    Patient Active Problem List Diagnosis Code    Gastroesophageal reflux disease without esophagitis K21.9    Acquired hypothyroidism E03.9    Anemia in chronic kidney disease (CKD) N18.9, D63.1    Iron deficiency E61.1    Family history of colon cancer Z80.0    Unintentional weight loss R63.4    Type 2 diabetes mellitus with chronic kidney disease on chronic dialysis, with long-term current use of insulin (HCC) E11.22, N18.6, Z99.2, Z79.4    Acute encephalopathy G93.40    Noncompliance of patient with renal dialysis ([facility] [address] Z91.15    Respiratory failure (HCC) J96.90    Acute respiratory failure with hypoxia and hypercapnia (HCC) J96.01, J96.02    Dialysis AV fistula malfunction (HCC) T82.590A    Gastroparesis K31.84    Nausea and vomiting R11.2    Chronic constipation K59.09    Uncontrolled type 2 diabetes mellitus with complication, with long-term current use of insulin (HCC) E11.8, E11.65, Z79.4    Abnormal CT of liver R93.2    Other ascites R18.8    ESRD (end stage renal disease) on dialysis (HCC) N18.6, Z99.2    High hepatic iron concentration determined by biopsy of liver E83.19    Steal syndrome as complication of dialysis access (HCC) H30.496G    PVD (peripheral vascular disease) (HCC) I73.9    ESRD on hemodialysis (HCC) N18.6, Z99.2    Hyponatremia E87.1    Hyperglycemia due to type 2 diabetes mellitus (HCC) E11.65    Normocytic anemia D64.9    Volume overload E87.70    Left homonymous hemianopsia H53.462    Acute intractable headache R51.9    Uncontrolled hypertension I10    Medically noncompliant Z91.19    Noncompliance with medications Z91.14    Seizure ([facility] [address] R56.9    Hyperglycemic hyperosmolar nonketotic coma (HCC) E11.01    Type 2 diabetes mellitus with hyperosmolar coma, with long-term current use of insulin ([facility] [address] E11.01, Z79.4    Palliative care patient Z51.5    Hypertensive emergency I16.1    Chronic epigastric pain R10.13, G89.29    Chronic nausea R11.0    Chronic vomiting R11.10    Abnormal CT of the abdomen R93.5    Poor appetite R63.0    Personal history of colonic polyps Z86.010    Hyperosmolar syndrome E87.0    Uncontrolled type 2 diabetes mellitus with hyperosmolar nonketotic hyperglycemia ([facility]) E11.00    Hypertensive urgency I16.0    Altered mental state R41.82    Sacroiliitis ([facility]) M46.1    Lumbar radiculopathy M54.16    Left leg pain M79.605    Left leg swelling M79.89    Unable to ambulate R26.2    Discharge from nipple N64.52    Severe hyperglycemia due to diabetes mellitus ([facility]) E11.65    Acute hypoxemic respiratory failure ([facility]) J96.01    Generalized weakness R53.1    UTI (urinary tract infection) N39.0    Hyperosmolar hyperglycemic state (HHS) ([facility]) E11.00, E11.65    Hyperglycemia R73.9    Hypoglycemia associated with diabetes ([facility]) E11.649    Closed fracture of left distal femur ([facility]) S72.402A    Cl[facility] of distal end of left femur[facility] S72.402A    Enterococcus UTI N39.0, B95.2    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       Measurements:  Wound 04/26/22 Coccyx Mid pressure injury (Active)   Wound Image   04/27/22 1355   Wound Etiology Deep tissue/Injury 04/27/22 1355   Dressing Status New dressing applied 04/27/22 1355   Wound Cleansed Soap and water 04/27/22 1355   Dressing/Treatment Calmoseptine/zinc oxide with menthol 04/27/22 1355   Dressing Change Due 04/27/22 04/27/22 1355   Wound Length (cm) 7 cm 04/27/22 1355   Wound Width (cm) 4 cm 04/27/22 1355   Wound Depth (cm) 0 cm 04/26/22 0538   Wound Surface Area (cm^2) 28 cm^2 04/27/22 1355   Change in Wound Size % (l*w) -2700 04/27/22 1355   Wound Volume (cm^3) 0 cm^3 04/26/22 0538   Wound Assessment Purple/maroon 04/27/22 1355   Drainage Amount None 04/27/22 1355   Odor None 04/27/22 1355   Tracie-wound Assessment Non-blanchable erythema;Denuded;Blanchable erythema; Maceration 04/27/22 1355   Margins Undefined edges 04/27/22 1355   Number of days: 1     Incision 05/25/21 Breast Right (Active)   Number of days: 337         Response to treatment:  Well tolerated by patient. Pain Assessment:  Severity:  0 / 10  Quality of pain: N/A  Wound Pain Timing/Severity: none  Premedicated: N/A    Plan   Plan of Care: Wound 04/26/22 Coccyx Mid pressure injury-Dressing/Treatment: Calmoseptine/zinc oxide with menthol    Specialty Bed Required : Yes   [x] Low Air Loss   [] Pressure Redistribution  [] Fluid Immersion  [] Bariatric  [] Total Pressure Relief  [] Other:     Current Diet: Diet NPO  ADULT TUBE FEEDING; Orogastric; Peptide Based High Protein; Continuous; 15; Yes; 5; Q 6 hours; 34; 25; Q 1 hour; Protein; 1 Proteinex as tf flush daily  Dietician consult:  Yes    Discharge Plan:  Placement for patient upon discharge: TBD    Patient appropriate for Outpatient [address]: TBD    Referrals:  []   [] [address] [employer] [facility]  [] Supplies  [] Other    Patient/Caregiver Teaching:  Level of patient/caregiver understanding able to:   [] Indicates understanding       [] Needs reinforcement  [] Unsuccessful      [] Verbal Understanding  [] Demonstrated understanding       [] No evidence of learning  [] Refused teaching         [x] N/A     Patient has developed deep tissue pressure pressure injury to sacrum. Areas are beginning to open. I expect this pressure injury will continue to evolve in to a full thickness wound. Recommendations:    Turn and position patient every 2 hours so as to remove pressure from sacrum. SACRUM: Clean with soap and water. Do not try to clean barrier cream off skin with soap and water. Clean outer layer of soil and reapply. Only remove with baby oil. Apply Calmoseptine to pressure injury, buttocks, and perineum 2 times per day and prn soiling.     Electronically signed by   Eduardo Benson RN, [address] [place] [address],[address] 4/27/2022

## 2022-04-27 NOTE — PROGRESS NOTES
Nephrology ([facility]) Progress Note    Patient:  Rosalia Feng  YOB: 1970  Date of Service: 4/27/2022  MRN: [id number]   Acct: [de-identified]   Primary Care Physician: ROMINA Contreras  Advance Directive: Full Code  Admit Date: 4/24/2022       Hospital Day: 3  Referring Provider: Romelia Jackson MD    Patient independently seen and examined, Chart, Consults, Notes, Operative notes, Labs, Cardiology, and Radiology studies reviewed as available. Chief complaint: Altered mental status/end-stage renal disease    Subjective:  Rosalia Feng is a 47 y.o. female for whom we were consulted for evaluation and treatment of end-stage renal disease. Patient also has history of seizure disorder, type 2 diabetes, hypertension, frequent hospitalization with noncompliance and poorly controlled diabetes. Patient was brought to the emergency room after she was found to have unresponsiveness and witnessed grand mal seizure. She was brought in by ambulance. On arrival in the emergency room she was found to be severely hyperglycemic and had a urinary tract infection. Patient was intubated, sedated and currently being treated by neurology for grand mal seizure. She is also receiving IV antibiotics and insulin drip. Her blood sugar control has significantly improved. On April 25, she has received emergent hemodialysis treatment for correction of volume status as well as hyponatremia. She is now moved from ICU to CCU. This morning patient is intubated/sedated. She is scheduled to have MRI of the brain. Patient is also getting continuous EEG monitoring for seizure activities  .     Allergies:  Penicillins, Lamisil advanced [terbinafine], Reglan [metoclopramide], and Stadol [butorphanol]    Medicines:  Current Facility-Administered Medications   Medication Dose Route Frequency Provider Last Rate Last Admin    niCARdipine (CARDENE) 25 mg in dextrose 5 % 250 mL infusion  2.5-15 mg/hr IntraVENous Continuous Donalee Heck,  mL/hr at 04/27/22 0600 10 mg/hr at 04/27/22 0600    sodium phosphate 10.5 mmol in sodium chloride 0.9 % 250 mL IVPB  0.16 mmol/kg IntraVENous PRN Patience Walter, DO        Or    sodium phosphate 21 mmol in sodium chloride 0.9 % 250 mL IVPB  0.32 mmol/kg IntraVENous PRN Patience Walter, DO        insulin lispro (HUMALOG) injection vial 0-12 Units  0-12 Units SubCUTAneous TID WC Donalee Heck, DO   6 Units at 04/27/22 0753    insulin lispro (HUMALOG) injection vial 0-6 Units  0-6 Units SubCUTAneous Nightly Donalee Heck, DO   2 Units at 04/26/22 2124    vancomycin (VANCOCIN) oral solution 125 mg  125 mg Oral 4 times per day Donalee Heck, DO   125 mg at 04/27/22 0606    hydrALAZINE (APRESOLINE) tablet 100 mg  100 mg Oral 3 times per day Patience Walter, DO   100 mg at 04/27/22 0606    propofol injection  5-50 mcg/kg/min IntraVENous Once Donalee Heck, DO 11.2 mL/hr at 04/24/22 1505 25 mcg/kg/min at 04/24/22 1505    glucagon (rDNA) injection 1 mg  1 mg IntraMUSCular PRN Donalee Heck, DO        dextrose 5 % solution  100 mL/hr IntraVENous PRN Donalee Heck, DO        glucose chewable tablet 16 g  4 tablet Oral PRN Donalee Heck, DO        dextrose bolus (hypoglycemia) 10% 125 mL  125 mL IntraVENous PRN Donalee Heck, DO   Stopped at 04/25/22 5630    Or    dextrose bolus (hypoglycemia) 10% 250 mL  250 mL IntraVENous PRN Donalee Heck, DO        sodium chloride flush 0.9 % injection 5-40 mL  5-40 mL IntraVENous 2 times per day Donalee Heck, DO   10 mL at 04/26/22 2123    sodium chloride flush 0.9 % injection 5-40 mL  5-40 mL IntraVENous PRN Donalee Heck, DO        0.9 % sodium chloride infusion   IntraVENous PRN Donalee Heck, DO        LORazepam (ATIVAN) injection 1 mg  1 mg IntraVENous Q5 Min PRN Donalee Heck, DO        heparin (porcine) injection 5,000 Units  5,000 Units SubCUTAneous TID Clint Iglesias, DO   5,000 Units at 04/27/22 5431    ondansetron (ZOFRAN-ODT) disintegrating tablet 4 mg  4 mg Oral Q8H PRN Clint Iglesias, DO        Or    ondansetron [facility]) injection 4 mg  4 mg IntraVENous Q6H PRN Clint Iglesias, DO        polyethylene glycol [facility]) packet 17 g  17 g Oral Daily PRN Clint Iglesias, DO        acetaminophen (TYLENOL) tablet 650 mg  650 mg Oral Q6H PRN Clint Iglesias, DO   650 mg at 04/25/22 9626    Or    acetaminophen (TYLENOL) suppository 650 mg  650 mg Rectal Q6H PRN Clint Iglesias, DO        levETIRAcetam (KEPPRA) 500 mg/100 mL IVPB  500 mg IntraVENous Q12H Clint Iglesias, DO   Stopped at 04/27/22 0129    chlorhexidine (PERIDEX) 0.12 % solution 15 mL  15 mL Mouth/Throat BID Clint Iglesias, DO   15 mL at 04/27/22 0753    famotidine (PEPCID) 20 mg in sodium chloride (PF) 10 mL injection  20 mg IntraVENous Daily Clint Iglesias, DO   20 mg at 04/27/22 7352    propofol injection  5-50 mcg/kg/min IntraVENous Continuous Clint Iglesias, DO   Stopped at 04/27/22 0559    magic butt cream   Topical Q4H PRN Clint Iglesias, DO   Given at 04/25/22 0028    magnesium sulfate 1000 mg in dextrose 5% 100 mL IVPB  1,000 mg IntraVENous PRN Patience Walter, DO        potassium bicarb-citric acid (EFFER-K) effervescent tablet 40 mEq  40 mEq Oral PRN Patience Walter, DO        Or    potassium chloride 10 mEq/100 mL IVPB (Peripheral Line)  10 mEq IntraVENous PRN Clyda Frieda, DO   Stopped at 04/25/22 9497       Past Medical History:  Past Medical History:   Diagnosis Date    Arthritis     Chronic kidney disease, stage 5, kidney failure (HCC)     Closed fracture of right shoulder girdle, with routine healing, subsequent encounter     DM (diabetes mellitus) type II controlled with [facility] manifestation [facility] 06/1993    Gastroparesis     GERD (gastroesophageal reflux disease)     [facility] dialysis on tues, thur, and sat at [address] [facility] Hypertension     Hypothyroid     Nasal congestion     recent    Neuropathy     Noncompliance with medications     Palliative care patient 10/08/2019    Restless leg syndrome        Past Surgical History:  Past Surgical History:   Procedure Laterality Date    BREAST SURGERY Left 2008    infected milk gland removed    BREAST SURGERY Right 5/25/2021    WEDGE EXCISION RIGHT BREAST DUCT WITH LACRIMAL DUCT PROBE, PEC BLOCK performed by Cat Dinero MD at [address] [facility], [address]      2008    COLONOSCOPY Left 9/17/2020    Dr Sahara Batista hepatic flexure-Severe tortuosity with severe spasming, 1 yr recall    DIALYSIS FISTULA CREATION Left 1/25/2019    REVISION LEFT UPPER EXTREMITY AV ACCESS WITH LEFT BRACHIAL ARTERY TO LEFT AXILLARY VEIN WITH  INTERPOSITIONAL ARTEGRAFT   performed by Myrna Lee MD at [address]  2012    [address] Right 1/25/2019    INSERTION OF RIGHT INTERNAL JUGULAR HEMODIALYSIS CATHETER performed by Myrna Lee MD at [address] [facility]  08/17/2018    1.  Moderately increased stainable iron identified by special stain in Kupffer cells and occasional hepatocytes. Negative for evidence of significant portal or lobular inflammation. Negative for evidence of significant fibrosis    PA COLONOSCOPY W/BIOPSY SINGLE/MULTIPLE N/A 10/20/2017    Dr Silva Patches prep-Tubular AP (-) dysplasia x 2--3 yr recall    PA EGD TRANSORAL BIOPSY SINGLE/MULTIPLE N/A 12/27/2016    Dr Moon Boards EGD TRANSORAL BIOPSY SINGLE/MULTIPLE N/A 10/20/2017    Dr Devin Ahn (-)   [address] [facility] VASCULAR SURGERY Left 2016    fistula by Dr Summer Crandall at [address]  10/02/2017    [facility]. Left upper fistulograms/venograms, balloon angioplasty cephalic vein arch 21M36 conquest.    VASCULAR SURGERY  08/2018    FISTULOGRAM    VASCULAR SURGERY  10/29/2018    [facility]. Left upper fistulograms/venograms    VASCULAR SURGERY  2018    [facility]. Arch aortogram,left upper arteriograms.  VASCULAR SURGERY  2019    [facility]. Removal of tunneled dialyis catheter right internal jugular vein.  VASCULAR SURGERY  2019    [facility]. Left upper extremity fistulogram including venography to the superior vena cava. Balloon angioplasty left subclavian vein with 10mm x 40mm conquest balloon. Balloon angioplasty mid/proximal upper arm cephalic vein with 34MZ x 40mm conquest balloon. Venograms after balloon angioplasty. Stent left mid/proximal upper arm cephalic vein stenosis fluency 10mm x 60mm self-expanding covered stent. Balloon     VASCULAR SURGERY  2019    cont angioplasty stent with 10mm x 40mm conquest balloon. Completion venograms left upper extremity.        Family History  Family History   Problem Relation Age of Onset    Colon Cancer Mother          at 63    Colon Polyps Mother     Lung Cancer Father          at 79    Colon Polyps Father     Stomach Cancer Sister     Breast Cancer Sister 27    Depression Daughter 25        committed suicide    Liver Cancer Neg Hx     Liver Disease Neg Hx     Esophageal Cancer Neg Hx     Rectal Cancer Neg Hx        Social History  Social History     Socioeconomic History    Marital status: Single     Spouse name: Not on file    Number of children: 2    Years of education: Not on file    Highest education level: Not on file   Occupational History    Not on file   Tobacco Use    Smoking status: Current Every Day Smoker     Packs/day: 0.50     Years: 33.00     Pack years: 16.50     Types: Cigarettes    Smokeless tobacco: Never Used    Tobacco comment: NOW at 1/4 PD, started at age 24 and has averaged 2 PPD   Vaping Use    Vaping Use: Never used   Substance and Sexual Activity    Alcohol use: No    Drug use: Not Currently     Types: Marijuana Mahesh Relic)    Sexual activity: Not Currently     Partners: Male   Other Topics Concern    Not on file   Social History Narrative    Not on file     Social Determinants of Health     Financial Resource Strain:     Difficulty of Paying Living Expenses: Not on file   Food Insecurity:     Worried About Running Out of Food in the Last Year: Not on file    Kendy of Food in the Last Year: Not on file   Transportation Needs:     Lack of Transportation (Medical): Not on file    Lack of Transportation (Non-Medical):  Not on file   Physical Activity:     Days of Exercise per Week: Not on file    Minutes of Exercise per Session: Not on file   Stress:     Feeling of Stress : Not on file   Social Connections:     Frequency of Communication with Friends and Family: Not on file    Frequency of Social Gatherings with Friends and Family: Not on file    Attends Restorationist Services: Not on file    Active Member of [address] [place] [address] [employer] or Organizations: Not on file    Attends Club or Organization Meetings: Not on file    Marital Status: Not on file   Intimate Partner Violence:     Fear of Current or Ex-Partner: Not on file    Emotionally Abused: Not on file    Physically Abused: Not on file    Sexually Abused: Not on file   Housing Stability:     Unable to Pay for Housing in the Last Year: Not on file    Number of [place] in the Last Year: Not on file    Unstable Housing in the Last Year: Not on file         Review of Systems:  Review of system is unobtainable as she is intubated and sedated.}       Objective:  Patient Vitals for the past 24 hrs:   BP Temp Temp src Pulse Resp SpO2   04/27/22 0746    81 16 99 %   04/27/22 0730    82 16 100 %   04/27/22 0700 (!) 127/57   82 16 100 %   04/27/22 0600 (!) 152/65   84 19 98 %   04/27/22 0500 (!) 130/59   78 16 97 %   04/27/22 0400 (!) 130/58 98.3 °F (36.8 °C) Temporal 78 16 97 %   04/27/22 0300 (!) 127/56   78 16 97 %   04/27/22 0206    79 16 97 %   04/27/22 0200 (!) 130/59   78 16 97 %   04/27/22 0100 (!) 124/58   79 16 96 %   04/27/22 0000 (!) 126/56 98 °F (36.7 °C) Temporal 80 16 94 %   04/26/22 2300 (!) 132/57   82 16 98 %   04/26/22 2200 128/61   82 16 96 %   04/26/22 2139 139/61        04/26/22 2138    83 16 96 %   04/26/22 2100 133/68   82 16 100 %   04/26/22 2000 (!) 143/64 98.7 °F (37.1 °C) Temporal 82 11 100 %   04/26/22 1930 (!) 134/59   79 16 100 %   04/26/22 1900 (!) 148/60   82 16 100 %   04/26/22 1832     16 100 %   04/26/22 1831    80 16 99 %   04/26/22 1800 (!) 126/52   81 16 100 %   04/26/22 1730 (!) 140/62   85 16    04/26/22 1700 (!) 137/56   85 16 100 %   04/26/22 1652    85 16 100 %   04/26/22 1645 (!) 133/49   86 15 100 %   04/26/22 1630 137/60   86 16 100 %   04/26/22 1615 (!) 141/48   88 16 100 %   04/26/22 1600 (!) 151/38 97.3 °F (36.3 °C) Temporal 84 16 100 %   04/26/22 1545 (!) 104/56   83 16 100 %   04/26/22 1530 91/64   83 16 100 %   04/26/22 1515 (!) 164/61   82 16 100 %   04/26/22 1500 (!) 178/55   82 20 100 %   04/26/22 1445 (!) 176/65   79 16 100 %   04/26/22 1430 (!) 186/46   77 16 100 %   04/26/22 1428    77 16 100 %   04/26/22 1415 (!) 175/71   77 16 100 %   04/26/22 1400 (!) 178/65   78 16 100 %   04/26/22 1347 (!) 197/72   81 15    04/26/22 1345 (!) 197/72   82 14 100 %   04/26/22 1336    79 16 100 %   04/26/22 1330 (!) 192/69   80 16 100 %   04/26/22 1315 (!) 190/102   77 16 100 %   04/26/22 1300 (!) 194/55   79 16 100 %   04/26/22 1245 (!) 182/40   80 16 100 %   04/26/22 1230 (!) 175/52   81 16 100 %   04/26/22 1215 (!) 151/61   77 16 100 %   04/26/22 1200 (!) 112/59 97.5 °F (36.4 °C) Temporal 81 14 100 %   04/26/22 1145 (!) 123/55   81 8 100 %   04/26/22 1130 (!) 140/58   81 11 100 %   04/26/22 1115 132/62   80 10 100 %   04/26/22 1100 (!) 143/61   81 13 100 %   04/26/22 1045 139/64   79 16 100 %   04/26/22 1034    79 16 100 %   04/26/22 1030 (!) 139/51   79 13 100 %   04/26/22 1015 (!) 150/69   80 16 100 %   04/26/22 1000 125/61 95 98   ALT 15 9 9   AST 19 13 14   PROT 6.9 5.7* 5.8*   BILITOT 0.7 0.4 0.4   LABALBU 4.0 2.6* 2.9*     Lactic Acid: No results for input(s): LACTA in the last 72 hours. Troponin: No results for input(s): CKTOTAL, CKMB, TROPONINT in the last 72 hours. ABGs:   Lab Results   Component Value Date    PHART 7.330 04/24/2022    PO2ART 389.0 04/24/2022    ZWM5AVD 41.0 04/24/2022     CRP:  No results for input(s): CRP in the last 72 hours. Sed Rate:  No results for input(s): SEDRATE in the last 72 hours. Culture Results:   Blood Culture Recent:   Recent Labs     04/24/22  1447   BC No Growth to date. Any change in status will be called. Urine Culture Recent :   Recent Labs     04/24/22  1354   LABURIN No growth at 36-48 hours       Radiology reports as per the Radiologist  Radiology: CT Head WO Contrast    Result Date: 4/24/2022  CT HEAD WO CONTRAST 4/24/2022 2:01 PM HISTORY: Altered mental status COMPARISON: CT scan dated 11/18/2021 DOSE LENGTH PRODUCT: 766 mGy cm TECHNIQUE: Helical tomographic images of the brain were obtained without the use of intravenous contrast. Automated exposure control was also utilized to decrease patient radiation dose. FINDINGS: There is no evidence of evolving large vascular territory infarct. No visualized intra-axial or extra-axial hemorrhage. No mass lesion is identified. Normal size and configuration of the ventricular system. The basal cisterns are symmetric. Posterior fossa structures are unremarkable. The included orbits and their contents are unremarkable. Chronic paranasal sinus disease on the left. The visualized osseous structures and overlying soft tissues of the skull and face are unremarkable. 1. No acute intracranial process.  Signed by Dr Danielle Peña    Result Date: 4/24/2022  XR CHEST PORTABLE 4/24/2022 4:27 PM HISTORY: ET tube, enteric tube  Technique: Single AP view of the chest COMPARISONS: 4/24/2022 FINDINGS: Endotracheal tube is identified with tip essentially touching the krysta. Enteric tube courses into the stomach with tip curled in the fundus back towards the gastroesophageal junction. The lungs are clear and well expanded. Heart size is stable. Pulmonary vasculature are nondilated. No pleural effusion or pneumothorax. 1. Endotracheal tube is deep, tip essentially touching the krysta. Lungs are clear and well expanded. I would recommend 2-3 cm withdrawal for a more optimal positioning. The results of this exam were discussed with Paloma Arriaga (ICU nursing) on 4/24/2022 at 1640 hours Signed by Dr Oleksandr Escamilla    XR CHEST PORTABLE    Result Date: 4/24/2022  XR CHEST PORTABLE 4/24/2022 1:17 PM HISTORY: ET tube placement  Technique: Single AP view of the chest COMPARISONS: 4/24/2022 FINDINGS: Status post endotracheal intubation. ET tube is in good position. Lungs are well-expanded. Enteric tube curls on itself in the midesophagus and then extends back up into the throat. Heart size is stable. Pulmonary vasculature are nondilated. 1. ET tube is in good position. Lungs are clear and well expanded. 2. Enteric tube curls on itself in the mid esophagus before extending back up into the throat. This will need repositioned. The results of this exam were discussed with Dr. Saint Clair on 4/24/2022 at 1329 hours Signed by Dr Oleksandr Escamilla    XR CHEST PORTABLE    Result Date: 4/24/2022  XR CHEST PORTABLE 4/24/2022 12:34 PM HISTORY: Altered mental status  Technique: Single AP view of the chest COMPARISONS: Chest exam dated 4/1/2022 FINDINGS: No lung consolidation. No pleural effusion or pneumothorax. Cardiomediastinal silhouette and pulmonary vasculature are unremarkable. No acute bony abnormality. Left subclavian region vascular stent with additional stent projecting over the left humerus. No acute cardiopulmonary process.  Signed by Dr Oleksandr Escamilla    XR CHEST PORTABLE    Result Date: 4/1/2022  XR CHEST PORTABLE 4/1/2022 12:35 PM HISTORY: Shortness of breath  Technique: Single AP view of the chest COMPARISONS: Chest exam dated 1/22/2022 FINDINGS: Cardiomegaly with central pulmonary vascular congestion, interstitial and alveolar edema as well as bilateral moderate layering pleural effusions. No pneumothorax. No acute bony abnormality. 1. Volume overload with interstitial and carie pulmonary edema as well as bilateral moderate pleural effusions. Signed by Dr Shayy Youssef   1. End-stage renal disease/dialysis dependent. 2.  Type II diabetic nephropathy. 3.  Acute respiratory failure/intubated. 4.  Status post grand mal seizure  5. Hyponatremia. 6.  Anemia of chronic kidney disease. 7.  Urinary tract infection. 8.  Pulmonary edema  9. Hypokalemia      Plan:  1. Routine hemodialysis in the morning. 2.  Pending MRI of the brain. 3.  EEG monitoring. 4.  Continue IV antibiotics. 5.  Continue Keppra  6.   Plan was discussed with critical care nurse    Jeanmarie Hi MD  04/27/22  8:25 AM

## 2022-04-27 NOTE — PROGRESS NOTES
Hospitalist Progress Note  [facility]     Patient: Rosalia Feng  : 1970  MRN: [id number]  Code Status: Full Code    Hospital Day: 3   Date of Service: 2022    Subjective:   Patient seen and examined. Intubated. Currently off sedation. No acute distress. Past Medical History:   Diagnosis Date    Arthritis     Chronic kidney disease, stage 5, kidney failure (HCC)     Closed fracture of right shoulder girdle, with routine healing, subsequent encounter     DM (diabetes mellitus) type II controlled with renal manifestation ([facility] 1993    Gastroparesis     GERD (gastroesophageal reflux disease)     Hemodialysis patient ([facility]     dialysis on tues, th, and sat at [facility]    Hypertension     Hypothyroid     Nasal congestion     recent    Neuropathy     Noncompliance with medications     Palliative care patient 10/08/2019    Restless leg syndrome        Past Surgical History:   Procedure Laterality Date    BREAST SURGERY Left     infected milk gland removed    BREAST SURGERY Right 2021    WEDGE EXCISION RIGHT BREAST DUCT WITH LACRIMAL DUCT PROBE, PEC BLOCK performed by Kimberly Rodas MD at [address] [facility]          COLONOSCOPY Left 2020    Dr Colton Rendon hepatic flexure-Severe tortuosity with severe spasming, 1 yr recall    DIALYSIS FISTULA CREATION Left 2019    REVISION LEFT UPPER EXTREMITY AV ACCESS WITH LEFT BRACHIAL ARTERY TO LEFT AXILLARY VEIN WITH  INTERPOSITIONAL ARTEGRAFT   performed by Reed Wilson MD at [address]  2012    [address] Right 2019    INSERTION OF RIGHT INTERNAL JUGULAR HEMODIALYSIS CATHETER performed by Reed Wilson MD at [address] [facility]  2018    1.  Moderately increased stainable iron identified by special stain in Kupffer cells and occasional hepatocytes. Negative for evidence of significant portal or lobular inflammation. Negative for evidence of significant fibrosis    [facility] COLONOSCOPY W/BIOPSY SINGLE/MULTIPLE N/A 10/20/2017    Dr Blaise Gupta prep-Tubular AP (-) dysplasia x 2--3 yr recall    NH EGD TRANSORAL BIOPSY SINGLE/MULTIPLE N/A 2016    Dr Diann Valentin EGD TRANSORAL BIOPSY SINGLE/MULTIPLE N/A 10/20/2017    Dr Mcnair Blades (-)   [address] VASCULAR SURGERY Left 2016    fistula by Dr Yeimi Reagan at [address]  10/02/2017    [facility]. Left upper fistulograms/venograms, balloon angioplasty cephalic vein arch [id number] conquest.    VASCULAR SURGERY  2018    FISTULOGRAM    VASCULAR SURGERY  10/29/2018    [facility]JS. Left upper fistulograms/venograms    VASCULAR SURGERY  2018    SJS. Arch aortogram,left upper arteriograms.  VASCULAR SURGERY  2019    SJS. Removal of tunneled dialyis catheter right internal jugular vein.  VASCULAR SURGERY  2019    [facility]. Left upper extremity fistulogram including venography to the superior vena cava. Balloon angioplasty left subclavian vein with 10mm x 40mm conquest balloon. Balloon angioplasty mid/proximal upper arm cephalic vein with 46HY x 40mm conquest balloon. Venograms after balloon angioplasty. Stent left mid/proximal upper arm cephalic vein stenosis fluency 10mm x 60mm self-expanding covered stent. Balloon     VASCULAR SURGERY  2019    cont angioplasty stent with 10mm x 40mm conquest balloon. Completion venograms left upper extremity.        Family History   Problem Relation Age of Onset    Colon Cancer Mother          at 63    Colon Polyps Mother     Lung Cancer Father          at 79    Colon Polyps Father     Stomach Cancer Sister     Breast Cancer Sister 27    Depression Daughter 25        committed suicide    Liver Cancer Neg Hx     Liver Disease Neg Hx     Esophageal Cancer Neg Hx     Rectal Cancer Neg Hx        Social History     Socioeconomic History    Marital status: Single     Spouse name: Not on file    Number of children: 2    Years of education: Not on file    Highest education level: Not on file   Occupational History    Not on file   Tobacco Use    Smoking status: Current Every Day Smoker     Packs/day: 0.50     Years: 33.00     Pack years: 16.50     Types: Cigarettes    Smokeless tobacco: Never Used    Tobacco comment: NOW at 1/4 PD, started at age 24 and has averaged 2 PPD   Vaping Use    Vaping Use: Never used   Substance and Sexual Activity    Alcohol use: No    Drug use: Not Currently     Types: Marijuana [place])    Sexual activity: Not Currently     Partners: Male   Other Topics Concern    Not on file   Social History Narrative    Not on file     Social Determinants of Health     Financial Resource Strain:     Difficulty of Paying Living Expenses: Not on file   Food Insecurity:     Worried About Running Out of Food in the Last Year: Not on file    Kendy of Food in the Last Year: Not on file   Transportation Needs:     Lack of Transportation (Medical): Not on file    Lack of Transportation (Non-Medical):  Not on file   Physical Activity:     Days of Exercise per Week: Not on file    Minutes of Exercise per Session: Not on file   Stress:     Feeling of Stress : Not on file   Social Connections:     Frequency of Communication with Friends and Family: Not on file    Frequency of Social Gatherings with Friends and Family: Not on file    Attends [employer] Services: Not on file    Active Member of [address] [place] [address] or Organizations: Not on file    Attends Club or Organization Meetings: Not on file    Marital Status: Not on file   Intimate Partner Violence:     Fear of Current or Ex-Partner: Not on file    Emotionally Abused: Not on file    Physically Abused: Not on file    Sexually Abused: Not on file   Housing Stability:     Unable to Pay for Housing in the Last Year: Not on file    Number of [place] in the Last Year: Not on file    Unstable Housing in the Last Year: Not on file       Current Facility-Administered Medications   Medication Dose Route Frequency Provider Last Rate Last Admin    niCARdipine (CARDENE) 25 mg in dextrose 5 % 250 mL infusion  2.5-15 mg/hr IntraVENous Continuous Poly Chafe, DO   Paused at 04/27/22 0739    sodium phosphate 10.5 mmol in sodium chloride 0.9 % 250 mL IVPB  0.16 mmol/kg IntraVENous PRN Patience Walter, DO        Or    sodium phosphate 21 mmol in sodium chloride 0.9 % 250 mL IVPB  0.32 mmol/kg IntraVENous PRN Patience Walter, DO        insulin lispro (HUMALOG) injection vial 0-12 Units  0-12 Units SubCUTAneous TID WC Poly Chafe, DO   2 Units at 04/27/22 1221    insulin lispro (HUMALOG) injection vial 0-6 Units  0-6 Units SubCUTAneous Nightly Poly Chafe, DO   2 Units at 04/26/22 2124    vancomycin (VANCOCIN) oral solution 125 mg  125 mg Oral 4 times per day Poly Chafe, DO   125 mg at 04/27/22 1221    hydrALAZINE (APRESOLINE) tablet 100 mg  100 mg Oral 3 times per day Patience Walter, DO   100 mg at 04/27/22 1221    propofol injection  5-50 mcg/kg/min IntraVENous Once Poly Chafe, DO 11.2 mL/hr at 04/24/22 1505 25 mcg/kg/min at 04/24/22 1505    glucagon (rDNA) injection 1 mg  1 mg IntraMUSCular PRN Poly Chafe, DO        dextrose 5 % solution  100 mL/hr IntraVENous PRN Poly Chafe, DO        glucose chewable tablet 16 g  4 tablet Oral PRN Poly Chafe, DO        dextrose bolus (hypoglycemia) 10% 125 mL  125 mL IntraVENous PRN Poly Chafe, DO   Stopped at 04/25/22 0012    Or    dextrose bolus (hypoglycemia) 10% 250 mL  250 mL IntraVENous PRN Poly Chafe, DO        sodium chloride flush 0.9 % injection 5-40 mL  5-40 mL IntraVENous 2 times per day Poly Chafe, DO   10 mL at 04/26/22 2123    sodium chloride flush 0.9 % injection 5-40 mL  5-40 mL IntraVENous PRN Poly Chafe, DO        0.9 % sodium chloride infusion   IntraVENous PRN Poly Chafe, DO  LORazepam (ATIVAN) injection 1 mg  1 mg IntraVENous Q5 Min PRN Wesley Camacho, DO        heparin (porcine) injection 5,000 Units  5,000 Units SubCUTAneous TID Wesley Camacho DO   5,000 Units at 04/27/22 3235    ondansetron (ZOFRAN-ODT) disintegrating tablet 4 mg  4 mg Oral Q8H PRN Wesley Camacho, DO        Or    ondansetron [facility]) injection 4 mg  4 mg IntraVENous Q6H PRN Wesley Camacho, DO        polyethylene glycol [facility]) packet 17 g  17 g Oral Daily PRN Wesley Camacho, DO        acetaminophen (TYLENOL) tablet 650 mg  650 mg Oral Q6H PRN Wesley Camacho, DO   650 mg at 04/25/22 9939    Or    acetaminophen (TYLENOL) suppository 650 mg  650 mg Rectal Q6H PRN Wesley Camacho, DO        levETIRAcetam (KEPPRA) 500 mg/100 mL IVPB  500 mg IntraVENous Q12H Wesley Camacho  mL/hr at 04/27/22 1224 500 mg at 04/27/22 1224    chlorhexidine (PERIDEX) 0.12 % solution 15 mL  15 mL Mouth/Throat BID Wesley Camacho, DO   15 mL at 04/27/22 0753    famotidine (PEPCID) 20 mg in sodium chloride (PF) 10 mL injection  20 mg IntraVENous Daily Wesley Camacho DO   20 mg at 04/27/22 3928    propofol injection  5-50 mcg/kg/min IntraVENous Continuous Wesley Camacho DO   Stopped at 04/27/22 0559    magic butt cream   Topical Q4H PRN Wesley Camacho DO   Given at 04/25/22 0028    magnesium sulfate 1000 mg in dextrose 5% 100 mL IVPB  1,000 mg IntraVENous PRN Patience Walter, DO        potassium bicarb-citric acid (EFFER-K) effervescent tablet 40 mEq  40 mEq Oral PRN Patience Walter, DO        Or    potassium chloride 10 mEq/100 mL IVPB (Peripheral Line)  10 mEq IntraVENous PRN Patience Walter, DO   Stopped at 04/25/22 0936         niCARdipine Stopped (04/27/22 0739)    dextrose      sodium chloride      propofol Stopped (04/27/22 0559)        Objective:   BP (!) 158/69   Pulse 82   Temp 98.1 °F (36.7 °C) (Temporal)   Resp 14   Ht 5' 6\" (1.676 m) Wt 144 lb 11.2 oz (65.6 kg)   SpO2 98%   BMI 23.36 kg/m²     General: no acute distress  HEENT: normocephalic, atraumatic  Neck: supple, symmetrical, trachea midline   Lungs: clear to auscultation bilaterally   Cardiovascular: s1 and s2 normal  Abdomen: soft, positive bowel sounds  Extremities: no cyanosis   Neuro: intubated, currently off sedation    Recent Labs     04/26/22  0146 04/27/22  0241   WBC 5.8 6.2   RBC 3.10* 3.37*   HGB 9.1* 9.7*   HCT 28.3* 30.8*   MCV 91.3 91.4   MCH 29.4 28.8   MCHC 32.2* 31.5*   * 114*     Recent Labs     04/25/22  1041 04/26/22  0146 04/27/22  0241   * 131* 126*   K 4.6 3.7 3.4*   ANIONGAP 16 14 13   CL 78* 93* 88*   CO2 20* 24 25   BUN 59* 31* 26*   CREATININE 5.1* 3.7* 2.8*   GLUCOSE 405* 123* 198*   CALCIUM 7.8* 8.6 8.5*     Recent Labs     04/25/22  1041 04/26/22  0146 04/27/22  0241   MG 1.9 2.1 1.9   PHOS 2.7 3.6 3.8     Recent Labs     04/26/22  0146 04/27/22  0241   AST 13 14   ALT 9 9   BILITOT 0.4 0.4   ALKPHOS 95 98     No results for input(s): PH, PO2, PCO2, HCO3, BE, O2SAT in the last 72 hours. No results for input(s): TROPONINI in the last 72 hours. No results for input(s): INR in the last 72 hours. No results for input(s): LACTA in the last 72 hours. Intake/Output Summary (Last 24 hours) at 4/27/2022 1353  Last data filed at 4/27/2022 1235  Gross per 24 hour   Intake 3079.43 ml   Output 320 ml   Net 2759.43 ml       No results found.      Assessment and Plan:   Seizure  Neurology following  Work-up in progress  AEDs per neurology  Seizure precautions    Ventilator dependent acute respiratory failure  Secondary to above  Titrate minute ventilation for pH 7.35  Follow ABG/CXR  Follow plateau pressure  Failed SBT 4/27/2022     ESRD on HD  Renal following     Medical noncompliance      C. difficile infection  Oral vancomycin     Tobacco dependence      [facility]  Since resolved  History of IDDM2  Meds on board  Follow Accu-Cheks/electrolytes    DVT prophylaxis  Heparin    Total critical care time: 50 minutes    Azeb Martin MD   4/27/2022 1:53 PM

## 2022-04-27 NOTE — PROGRESS NOTES
Comprehensive Nutrition Assessment    Type and Reason for Visit:  Reassess    Nutrition Recommendations/Plan:   1. Modifying EN to Vital AF 1.2 at Propofol has been discontinued  Stopping free water flush     Malnutrition Assessment:  Malnutrition Status: At risk for malnutrition (Comment) (04/27/22 8645)    Context:  Acute Illness     Findings of the 6 clinical characteristics of malnutrition:  Energy Intake:  Mild decrease in energy intake (Comment)  Weight Loss:  No significant weight loss     Body Fat Loss:  No significant body fat loss     Muscle Mass Loss:  No significant muscle mass loss    Fluid Accumulation:  Mild Extremities   Strength:  Not Performed    Nutrition Assessment:    EN has been started. Currently Vital High Protein is running at 25ml/hr. Propofol has been discontinued. Modifying current TF to Vital AF 1.2 to meet nutritional needs. Shall stop free water flush at this time as Na+ level is 126. Nutrition Related Findings:    on vent Wound Type: Unstageable       Current Nutrition Intake & Therapies:    Average Meal Intake: NPO  Average Supplements Intake: NPO  Current Tube Feeding (TF) Orders:  · Feeding Route: Orogastric  · Formula: Peptide Based  · Schedule: Continuous  · Feeding Regimen: Vital AF 1.2 with goal rate of 54ml/hr  No Proteinex  · Additives/Modulars: None  · Water Flushes: None  · Current TF & Flush Orders Provides: Vital High Protein 25ml/hr = 600 kcals with 52g protein, 66g CHO and 501 ml free water. Stopping free water flush  · Goal TF & Flush Orders Provides: Vital AF 1.2 @ 54ml/hr = 1555 kcals with 97g protin, 143g CHO abnd 1083 ml free water      Anthropometric Measures:  Height: 5' 6\" (167.6 cm)  Ideal Body Weight (IBW): 130 lbs (59 kg)    Admission Body Weight: 165 lb (74.8 kg) (estimated)  Current Body Weight: 144 lb 11.2 oz (65.6 kg), 111.3 % IBW.     Current BMI (kg/m2): 23.4  Usual Body Weight: 144 lb (65.3 kg) (1/2022)  % Weight Change (Calculated): 0. 5  BMI Categories: Normal Weight (BMI 18.5-24. 9)    Estimated Daily Nutrient Needs:  Energy Requirements Based On: Kcal/kg  Weight Used for Energy Requirements: Current  Energy (kcal/day): 3060-7306 kcals (20-25 kcals/kg)  Weight Used for Protein Requirements: Current  Protein (g/day): 98g  Method Used for Fluid Requirements: 1 ml/kcal  Fluid (ml/day): 2514-8526 ml    Nutrition Diagnosis:   · Inadequate oral intake related to acute injury/trauma,impaired respiratory function as evidenced by NPO or clear liquid status due to medical condition,intubation,nutrition support - enteral nutrition      Nutrition Interventions:   Food and/or Nutrient Delivery: Modify Tube Feeding  Nutrition Education/Counseling: No recommendation at this time,Education not appropriate  Coordination of Nutrition Care: Continue to monitor while inpatient  Plan of Care discussed with: RN    Goals:  Previous Goal Met: Progressing toward Goal(s)  Goals: Meet at least 75% of estimated needs,Initiate nutrition support       Nutrition Monitoring and Evaluation:   Behavioral-Environmental Outcomes: None Identified  Food/Nutrient Intake Outcomes: Enteral Nutrition Intake/Tolerance  Physical Signs/Symptoms Outcomes: Biochemical Data,Fluid Status or Edema,Weight,Skin,Nutrition Focused Physical Findings    Discharge Planning:     Too soon to determine     Elo Luther, MS, RD, LD  Contact: [phone]

## 2022-04-28 LAB
ALBUMIN SERPL-MCNC: 3.3 G/DL (ref 3.5–5.2)
ALP BLD-CCNC: 109 U/L (ref 35–104)
ALT SERPL-CCNC: 9 U/L (ref 5–33)
ANION GAP SERPL CALCULATED.3IONS-SCNC: 19 MMOL/L (ref 7–19)
AST SERPL-CCNC: 13 U/L (ref 5–32)
BASOPHILS ABSOLUTE: 0 K/UL (ref 0–0.2)
BASOPHILS RELATIVE PERCENT: 0.6 % (ref 0–1)
BILIRUB SERPL-MCNC: 0.4 MG/DL (ref 0.2–1.2)
BUN BLDV-MCNC: 32 MG/DL (ref 6–20)
CALCIUM SERPL-MCNC: 8.5 MG/DL (ref 8.6–10)
CHLORIDE BLD-SCNC: 83 MMOL/L (ref 98–111)
CO2: 21 MMOL/L (ref 22–29)
CREAT SERPL-MCNC: 3.5 MG/DL (ref 0.5–0.9)
EOSINOPHILS ABSOLUTE: 0.1 K/UL (ref 0–0.6)
EOSINOPHILS RELATIVE PERCENT: 1 % (ref 0–5)
GFR AFRICAN AMERICAN: 17
GFR NON-AFRICAN AMERICAN: 14
GLUCOSE BLD-MCNC: 167 MG/DL (ref 70–99)
GLUCOSE BLD-MCNC: 202 MG/DL (ref 70–99)
GLUCOSE BLD-MCNC: 212 MG/DL (ref 70–99)
GLUCOSE BLD-MCNC: 235 MG/DL (ref 74–109)
GLUCOSE BLD-MCNC: 298 MG/DL (ref 70–99)
HCT VFR BLD CALC: 30.2 % (ref 37–47)
HEMOGLOBIN: 9.6 G/DL (ref 12–16)
IMMATURE GRANULOCYTES #: 0 K/UL
LYMPHOCYTES ABSOLUTE: 0.6 K/UL (ref 1.1–4.5)
LYMPHOCYTES RELATIVE PERCENT: 8.5 % (ref 20–40)
MAGNESIUM: 2.1 MG/DL (ref 1.6–2.6)
MCH RBC QN AUTO: 29.2 PG (ref 27–31)
MCHC RBC AUTO-ENTMCNC: 31.8 G/DL (ref 33–37)
MCV RBC AUTO: 91.8 FL (ref 81–99)
MONOCYTES ABSOLUTE: 0.5 K/UL (ref 0–0.9)
MONOCYTES RELATIVE PERCENT: 6.5 % (ref 0–10)
NEUTROPHILS ABSOLUTE: 5.8 K/UL (ref 1.5–7.5)
NEUTROPHILS RELATIVE PERCENT: 83.3 % (ref 50–65)
PDW BLD-RTO: 19.2 % (ref 11.5–14.5)
PERFORMED ON: ABNORMAL
PHOSPHORUS: 4.9 MG/DL (ref 2.5–4.5)
PLATELET # BLD: 131 K/UL (ref 130–400)
PMV BLD AUTO: 11 FL (ref 9.4–12.3)
POTASSIUM SERPL-SCNC: 3.4 MMOL/L (ref 3.5–5)
RBC # BLD: 3.29 M/UL (ref 4.2–5.4)
SODIUM BLD-SCNC: 123 MMOL/L (ref 136–145)
TOTAL PROTEIN: 5.9 G/DL (ref 6.6–8.7)
WBC # BLD: 6.9 K/UL (ref 4.8–10.8)

## 2022-04-28 PROCEDURE — 36415 COLL VENOUS BLD VENIPUNCTURE: CPT

## 2022-04-28 PROCEDURE — 99232 SBSQ HOSP IP/OBS MODERATE 35: CPT

## 2022-04-28 PROCEDURE — 95720 EEG PHY/QHP EA INCR W/VEEG: CPT | Performed by: PSYCHIATRY & NEUROLOGY

## 2022-04-28 PROCEDURE — 80053 COMPREHEN METABOLIC PANEL: CPT

## 2022-04-28 PROCEDURE — 84100 ASSAY OF PHOSPHORUS: CPT

## 2022-04-28 PROCEDURE — 2700000000 HC OXYGEN THERAPY PER DAY

## 2022-04-28 PROCEDURE — 2580000003 HC RX 258: Performed by: INTERNAL MEDICINE

## 2022-04-28 PROCEDURE — 83735 ASSAY OF MAGNESIUM: CPT

## 2022-04-28 PROCEDURE — 2100000000 HC CCU R&B

## 2022-04-28 PROCEDURE — 6370000000 HC RX 637 (ALT 250 FOR IP): Performed by: INTERNAL MEDICINE

## 2022-04-28 PROCEDURE — 2500000003 HC RX 250 WO HCPCS: Performed by: INTERNAL MEDICINE

## 2022-04-28 PROCEDURE — 99233 SBSQ HOSP IP/OBS HIGH 50: CPT | Performed by: PSYCHIATRY & NEUROLOGY

## 2022-04-28 PROCEDURE — 6360000002 HC RX W HCPCS: Performed by: INTERNAL MEDICINE

## 2022-04-28 PROCEDURE — 95708 EEG WO VID EA 12-26HR UNMNTR: CPT

## 2022-04-28 PROCEDURE — 85025 COMPLETE CBC W/AUTO DIFF WBC: CPT

## 2022-04-28 PROCEDURE — 8010000000 HC HEMODIALYSIS ACUTE INPT

## 2022-04-28 PROCEDURE — 94003 VENT MGMT INPAT SUBQ DAY: CPT

## 2022-04-28 PROCEDURE — 82947 ASSAY GLUCOSE BLOOD QUANT: CPT

## 2022-04-28 RX ORDER — DEXMEDETOMIDINE HYDROCHLORIDE 4 UG/ML
.1-1.5 INJECTION, SOLUTION INTRAVENOUS CONTINUOUS
Status: DISCONTINUED | OUTPATIENT
Start: 2022-04-28 | End: 2022-05-12

## 2022-04-28 RX ADMIN — HEPARIN SODIUM 5000 UNITS: 5000 INJECTION INTRAVENOUS; SUBCUTANEOUS at 07:36

## 2022-04-28 RX ADMIN — INSULIN LISPRO 1 UNITS: 100 INJECTION, SOLUTION INTRAVENOUS; SUBCUTANEOUS at 20:11

## 2022-04-28 RX ADMIN — VANCOMYCIN HYDROCHLORIDE 125 MG: 1 INJECTION, POWDER, LYOPHILIZED, FOR SOLUTION INTRAVENOUS at 14:13

## 2022-04-28 RX ADMIN — INSULIN LISPRO 6 UNITS: 100 INJECTION, SOLUTION INTRAVENOUS; SUBCUTANEOUS at 07:37

## 2022-04-28 RX ADMIN — HYDRALAZINE HYDROCHLORIDE 100 MG: 50 TABLET, FILM COATED ORAL at 14:19

## 2022-04-28 RX ADMIN — DEXTROSE MONOHYDRATE 11.5 MG/HR: 50 INJECTION, SOLUTION INTRAVENOUS at 22:41

## 2022-04-28 RX ADMIN — HEPARIN SODIUM 5000 UNITS: 5000 INJECTION INTRAVENOUS; SUBCUTANEOUS at 14:20

## 2022-04-28 RX ADMIN — HYDRALAZINE HYDROCHLORIDE 100 MG: 50 TABLET, FILM COATED ORAL at 20:14

## 2022-04-28 RX ADMIN — SODIUM CHLORIDE, PRESERVATIVE FREE 20 MG: 5 INJECTION INTRAVENOUS at 07:36

## 2022-04-28 RX ADMIN — HYDRALAZINE HYDROCHLORIDE 100 MG: 50 TABLET, FILM COATED ORAL at 05:46

## 2022-04-28 RX ADMIN — DEXTROSE MONOHYDRATE 7.5 MG/HR: 50 INJECTION, SOLUTION INTRAVENOUS at 03:23

## 2022-04-28 RX ADMIN — VANCOMYCIN HYDROCHLORIDE 125 MG: 1 INJECTION, POWDER, LYOPHILIZED, FOR SOLUTION INTRAVENOUS at 17:05

## 2022-04-28 RX ADMIN — ANORECTAL OINTMENT: 15.7; .44; 24; 20.6 OINTMENT TOPICAL at 05:45

## 2022-04-28 RX ADMIN — DEXTROSE MONOHYDRATE 9 MG/HR: 50 INJECTION, SOLUTION INTRAVENOUS at 20:09

## 2022-04-28 RX ADMIN — LEVETIRACETAM 500 MG: 5 INJECTION INTRAVENOUS at 01:20

## 2022-04-28 RX ADMIN — VANCOMYCIN HYDROCHLORIDE 125 MG: 1 INJECTION, POWDER, LYOPHILIZED, FOR SOLUTION INTRAVENOUS at 00:02

## 2022-04-28 RX ADMIN — Medication 10 ML: at 20:11

## 2022-04-28 RX ADMIN — CHLORHEXIDINE GLUCONATE 15 ML: 1.2 RINSE ORAL at 20:11

## 2022-04-28 RX ADMIN — DEXMEDETOMIDINE HYDROCHLORIDE 0.2 MCG/KG/HR: 4 INJECTION, SOLUTION INTRAVENOUS at 19:56

## 2022-04-28 RX ADMIN — VANCOMYCIN HYDROCHLORIDE 125 MG: 1 INJECTION, POWDER, LYOPHILIZED, FOR SOLUTION INTRAVENOUS at 05:45

## 2022-04-28 RX ADMIN — LEVETIRACETAM 500 MG: 5 INJECTION INTRAVENOUS at 14:17

## 2022-04-28 RX ADMIN — INSULIN LISPRO 4 UNITS: 100 INJECTION, SOLUTION INTRAVENOUS; SUBCUTANEOUS at 17:06

## 2022-04-28 RX ADMIN — DEXTROSE MONOHYDRATE 7.5 MG/HR: 50 INJECTION, SOLUTION INTRAVENOUS at 06:30

## 2022-04-28 RX ADMIN — HEPARIN SODIUM 5000 UNITS: 5000 INJECTION INTRAVENOUS; SUBCUTANEOUS at 20:10

## 2022-04-28 RX ADMIN — CHLORHEXIDINE GLUCONATE 15 ML: 1.2 RINSE ORAL at 07:37

## 2022-04-28 RX ADMIN — DEXTROSE MONOHYDRATE 7.5 MG/HR: 50 INJECTION, SOLUTION INTRAVENOUS at 00:02

## 2022-04-28 RX ADMIN — INSULIN LISPRO 4 UNITS: 100 INJECTION, SOLUTION INTRAVENOUS; SUBCUTANEOUS at 11:53

## 2022-04-28 ASSESSMENT — PAIN SCALES - GENERAL
PAINLEVEL_OUTOF10: 1

## 2022-04-28 ASSESSMENT — PULMONARY FUNCTION TESTS
PIF_VALUE: 19
PIF_VALUE: 22
PIF_VALUE: 23
PIF_VALUE: 17
PIF_VALUE: 25
PIF_VALUE: 23
PIF_VALUE: 21
PIF_VALUE: 19
PIF_VALUE: 22
PIF_VALUE: 20
PIF_VALUE: 21
PIF_VALUE: 33
PIF_VALUE: 19
PIF_VALUE: 26
PIF_VALUE: 20

## 2022-04-28 NOTE — CARE COORDINATION
Discussed LTACH referral with attending who was in agreement.  Left VM for s/o Yobani requesting a call back in order to discuss services and determine if able to send the referral.

## 2022-04-28 NOTE — PROGRESS NOTES
[id number] [facility] Neurology Progress Note      Patient:   Dillan Baez  MR#:    [id number]   Room:    [address] [place] [address]   YOB: 1970  Date of Progress Note: 4/28/2022  Time of Note                           11:09 AM  Consulting Physician:  Donna Shahid DO  Attending Physician:  Luo Sanchez MD      INTERVAL HISTORY:  No acute events, remains intubated, off propofol, no seizures overnight, is a little more responsive. REVIEW OF SYSTEMS:  Unable to obtain     PHYSICAL EXAM:    Constitutional    BP (!) 153/66   Pulse 91   Temp 97.7 °F (36.5 °C) (Temporal)   Resp 19   Ht 5' 6\" (1.676 m)   Wt 144 lb 11.2 oz (65.6 kg)   SpO2 99%   BMI 23.36 kg/m²   General appearance: Intubated, sedated   EYES -   Conjunctiva normal  Pupillary exam as below, see CN exam in the neurologic exam  ENT-    No scars, masses, or lesions over external nose or ears  Oropharynx - intubated  Cardiovascular -   No clubbing, cyanosis  Pulmonary-   Mechanically ventilated   Musculoskeletal    No significant wasting of muscles noted  Gait as below, see gait exam in the neurologic exam  Muscle strength, tone, stability as below see the motor exam in the neurologic exam.   No bony deformities  Skin    Warm, dry, and intact to inspection and palpation. No rash, erythema, or pallor        NEUROLOGICAL EXAM     Mental status    []? Awake, alert, oriented   []? Affect attention and concentration appear appropriate  []? Recent and remote memory appears unremarkable  []? Speech normal without dysarthria or aphasia, comprehension and repetition intact. COMMENTS:  Unresponsive to voice, not following commands, does appear to localize to pain intermittently   Cranial Nerves []? No VF deficit to confrontation,  optic discs normal, no papilledema on fundoscopic exam.  []? PERRLA, EOMI, no nystagmus, conjugate eye movements, no ptosis  []? Face symmetric  []? Facial sensation intact  []?  Tongue midline no atrophy or fasciculations present  []? Palate midline, hearing to finger rub normal  []? Shoulder shrug and SCM testing normal  COMMENTS:  PERRL, Face appears sym, OCR present   Motor   []? 5/5 strength x 4 extremities  [x]? Normal bulk and tone  [x]? No tremor present  []? No rigidity or bradykinesia noted  COMMENTS: Withdrawal x 4   Sensory  []? Sensation intact to light touch, pin prick, vibration, and proprioception BLE  []? Sensation intact to light touch, pin prick, vibration, and proprioception BUE  COMMENTS: Limited    Coordination []? FTN normal bilaterally   []? HTS normal bilaterally  []? SANFORD normal.   COMMENTS: Limited    Reflexes  [x]? Symmetric and non-pathological  [x]? Toes downgoing bilaterally  [x]? No clonus present  COMMENTS:   Gait                  []? Normal steady gait    []? Ataxic    []? Spastic     []? Magnetic     []? Shuffling  [x]? Not assessed  COMMENTS:        LABS/IMAGING:    CT Head WO Contrast    Result Date: 4/24/2022  CT HEAD WO CONTRAST 4/24/2022 2:01 PM HISTORY: Altered mental status COMPARISON: CT scan dated 11/18/2021 DOSE LENGTH PRODUCT: 766 mGy cm TECHNIQUE: Helical tomographic images of the brain were obtained without the use of intravenous contrast. Automated exposure control was also utilized to decrease patient radiation dose. FINDINGS: There is no evidence of evolving large vascular territory infarct. No visualized intra-axial or extra-axial hemorrhage. No mass lesion is identified. Normal size and configuration of the ventricular system. The basal cisterns are symmetric. Posterior fossa structures are unremarkable. The included orbits and their contents are unremarkable. Chronic paranasal sinus disease on the left. The visualized osseous structures and overlying soft tissues of the skull and face are unremarkable. 1. No acute intracranial process.  Signed by Dr Hugh Bell    Result Date: 4/24/2022  XR CHEST PORTABLE 4/24/2022 4:27 PM HISTORY: ET tube, enteric tube Technique: Single AP view of the chest COMPARISONS: 4/24/2022 FINDINGS: Endotracheal tube is identified with tip essentially touching the krysta. Enteric tube courses into the stomach with tip curled in the fundus back towards the gastroesophageal junction. The lungs are clear and well expanded. Heart size is stable. Pulmonary vasculature are nondilated. No pleural effusion or pneumothorax. 1. Endotracheal tube is deep, tip essentially touching the krysta. Lungs are clear and well expanded. I would recommend 2-3 cm withdrawal for a more optimal positioning. The results of this exam were discussed with Pat Rivers (ICU nursing) on 4/24/2022 at 1640 hours Signed by Dr Aurora Benson    XR CHEST PORTABLE    Result Date: 4/24/2022  XR CHEST PORTABLE 4/24/2022 1:17 PM HISTORY: ET tube placement  Technique: Single AP view of the chest COMPARISONS: 4/24/2022 FINDINGS: Status post endotracheal intubation. ET tube is in good position. Lungs are well-expanded. Enteric tube curls on itself in the midesophagus and then extends back up into the throat. Heart size is stable. Pulmonary vasculature are nondilated. 1. ET tube is in good position. Lungs are clear and well expanded. 2. Enteric tube curls on itself in the mid esophagus before extending back up into the throat. This will need repositioned. The results of this exam were discussed with Dr. Ignacio Troncoso on 4/24/2022 at 1329 hours Signed by Dr Aurora Benson    XR CHEST PORTABLE    Result Date: 4/24/2022  XR CHEST PORTABLE 4/24/2022 12:34 PM HISTORY: Altered mental status  Technique: Single AP view of the chest COMPARISONS: Chest exam dated 4/1/2022 FINDINGS: No lung consolidation. No pleural effusion or pneumothorax. Cardiomediastinal silhouette and pulmonary vasculature are unremarkable. No acute bony abnormality. Left subclavian region vascular stent with additional stent projecting over the left humerus. No acute cardiopulmonary process.  Signed by Dr Aurora Benson      Lab Results   Component Value Date    WBC 6.9 04/28/2022    HGB 9.6 (L) 04/28/2022    HCT 30.2 (L) 04/28/2022    MCV 91.8 04/28/2022     04/28/2022     Lab Results   Component Value Date     (L) 04/28/2022    K 3.4 (L) 04/28/2022    CL 83 (L) 04/28/2022    CO2 21 (L) 04/28/2022    BUN 32 (H) 04/28/2022    CREATININE 3.5 (H) 04/28/2022    GLUCOSE 235 (H) 04/28/2022    CALCIUM 8.5 (L) 04/28/2022    PROT 5.9 (L) 04/28/2022    LABALBU 3.3 (L) 04/28/2022    BILITOT 0.4 04/28/2022    ALKPHOS 109 (H) 04/28/2022    AST 13 04/28/2022    ALT 9 04/28/2022    LABGLOM 14 (A) 04/28/2022    GFRAA 17 (L) 04/28/2022     Lab Results   Component Value Date    INR 0.96 01/01/2022    INR 0.97 11/18/2021    INR 1.06 05/29/2021    PROTIME 13.0 01/01/2022    PROTIME 13.1 11/18/2021    PROTIME 13.7 05/29/2021       RECORD REVIEW:   Previous medical records, medications were reviewed at today's visit. Nursing/physician notes, imaging, labs and vitals reviewed. ASSESSMENT:  47 y.o. admitted after an episode of unresponsiveness followed by generalized tonic-clonic event in the emergency department. She had profoundly elevated glucose possible hyperglycemic coma, hyponatremia, underlying UTI,  end-stage renal disease with a history of noncompliance noted as well. Head CT nothing acute. No seizure noted on cEEG overnight. Exam remains largely unchanged, now off sedation.      PLAN:  1. MRI brain when able  2. Continue cEEG monitoring  3. Continue Keppra, off propofol   4. Continue addressing blood glucose, hyponatremia, UTI, ESRD nephrology following    Please feel free to call with any questions. [phone] (cell phone).       Bucky Murray DO  Board Certified Neurology

## 2022-04-28 NOTE — PROGRESS NOTES
14:09 - Spontaneous breathing trial started. 14:25 Spontaneous breathing trial stopped. Patient placed back on ventilator at previous settings.  TS RRT

## 2022-04-28 NOTE — PROGRESS NOTES
Spoke with pt's significant other Mr. Charo Cuevas regarding need for bilateral soft wrist restraints. He states to do whatever needs to be done to keep her safe.      Electronically signed by Bri Booth RN on 4/28/2022 at 5:42 AM

## 2022-04-28 NOTE — PROGRESS NOTES
Hospitalist Progress Note  [facility]     Patient: Ina Zamarripa  : 1970  MRN: [id number]  Code Status: Full Code    Hospital Day: 4   Date of Service: 2022    Subjective:   Patient seen and examined. Intubated. Continues to not require sedation. Past Medical History:   Diagnosis Date    Arthritis     Chronic kidney disease, stage 5, kidney failure (HCC)     Closed fracture of right shoulder girdle, with routine healing, subsequent encounter     DM (diabetes mellitus) type II controlled with renal manifestation ([facility] 1993    Gastroparesis     GERD (gastroesophageal reflux disease)     Hemodialysis patient ([facility] [address]     dialysis on tues, thur, and sat at [facility]    Hypertension     Hypothyroid     Nasal congestion     recent    Neuropathy     Noncompliance with medications     Palliative care patient 10/08/2019    Restless leg syndrome        Past Surgical History:   Procedure Laterality Date    BREAST SURGERY Left     infected milk gland removed    BREAST SURGERY Right 2021    WEDGE EXCISION RIGHT BREAST DUCT WITH LACRIMAL DUCT PROBE, PEC BLOCK performed by Melly Lantigua MD at [address] [place] [address]          COLONOSCOPY Left 2020    Dr Artem Frances hepatic flexure-Severe tortuosity with severe spasming, 1 yr recall    DIALYSIS FISTULA CREATION Left 2019    REVISION LEFT UPPER EXTREMITY AV ACCESS WITH LEFT BRACHIAL ARTERY TO LEFT AXILLARY VEIN WITH  INTERPOSITIONAL ARTEGRAFT   performed by Cecille Dugan MD at [facility]  2012    [address] Right 2019    INSERTION OF RIGHT INTERNAL JUGULAR HEMODIALYSIS CATHETER performed by Cecille Dugan MD at [address] [facility]  2018    1.  Moderately increased stainable iron identified by special stain in Kupffer cells and occasional hepatocytes. Negative for evidence of significant portal or lobular inflammation.  Negative for evidence of significant fibrosis    IL COLONOSCOPY W/BIOPSY SINGLE/MULTIPLE N/A 10/20/2017    Dr John Ellison prep-Tubular AP (-) dysplasia x 2--3 yr recall    IL EGD TRANSORAL BIOPSY SINGLE/MULTIPLE N/A 2016    Dr [facility] EGD TRANSORAL BIOPSY SINGLE/MULTIPLE N/A 10/20/2017    Dr Victoria Stager (-)    TUBAL LIGATION          VASCULAR SURGERY Left 2016    fistula by Dr Volodymyr Ugalde at [address]  10/02/2017    [facility]. Left upper fistulograms/venograms, balloon angioplasty cephalic vein arch [id number] conquest.    VASCULAR SURGERY  2018    FISTULOGRAM    VASCULAR SURGERY  10/29/2018    [facility]. Left upper fistulograms/venograms    VASCULAR SURGERY  2018    [facility]. Arch aortogram,left upper arteriograms.  VASCULAR SURGERY  2019    [facility]. Removal of tunneled dialyis catheter right internal jugular vein.  VASCULAR SURGERY  2019    [facility]. Left upper extremity fistulogram including venography to the superior vena cava. Balloon angioplasty left subclavian vein with 10mm x 40mm conquest balloon. Balloon angioplasty mid/proximal upper arm cephalic vein with 56CQ x 40mm conquest balloon. Venograms after balloon angioplasty. Stent left mid/proximal upper arm cephalic vein stenosis fluency 10mm x 60mm self-expanding covered stent. Balloon     VASCULAR SURGERY  2019    cont angioplasty stent with 10mm x 40mm conquest balloon. Completion venograms left upper extremity.        Family History   Problem Relation Age of Onset    Colon Cancer Mother          at 63    Colon Polyps Mother     Lung Cancer Father          at 79    Colon Polyps Father     Stomach Cancer Sister     Breast Cancer Sister 27    Depression Daughter 25        committed suicide    Liver Cancer Neg Hx     Liver Disease Neg Hx     Esophageal Cancer Neg Hx     Rectal Cancer Neg Hx        Social History     Socioeconomic History    Marital status: Single     Spouse name: Not on file    Number of children: 2  Years of education: Not on file    Highest education level: Not on file   Occupational History    Not on file   Tobacco Use    Smoking status: Current Every Day Smoker     Packs/day: 0.50     Years: 33.00     Pack years: 16.50     Types: Cigarettes    Smokeless tobacco: Never Used    Tobacco comment: NOW at 1/4 PD, started at age 24 and has averaged 2 PPD   Vaping Use    Vaping Use: Never used   Substance and Sexual Activity    Alcohol use: No    Drug use: Not Currently     Types: Marijuana [employer])    Sexual activity: Not Currently     Partners: Male   Other Topics Concern    Not on file   Social History Narrative    Not on file     Social Determinants of Health     Financial Resource Strain:     Difficulty of Paying Living Expenses: Not on file   Food Insecurity:     Worried About Running Out of Food in the Last Year: Not on file    Kendy of Food in the Last Year: Not on file   Transportation Needs:     Lack of Transportation (Medical): Not on file    Lack of Transportation (Non-Medical):  Not on file   Physical Activity:     Days of Exercise per Week: Not on file    Minutes of Exercise per Session: Not on file   Stress:     Feeling of Stress : Not on file   Social Connections:     Frequency of Communication with Friends and Family: Not on file    Frequency of Social Gatherings with Friends and Family: Not on file    Attends Hinduism Services: Not on file    Active Member of [address] [place] [address] or Organizations: Not on file    Attends Club or Organization Meetings: Not on file    Marital Status: Not on file   Intimate Partner Violence:     Fear of Current or Ex-Partner: Not on file    Emotionally Abused: Not on file    Physically Abused: Not on file    Sexually Abused: Not on file   Housing Stability:     Unable to Pay for Housing in the Last Year: Not on file    Number of [place] in the Last Year: Not on file    Unstable Housing in the Last Year: Not on file       Current Facility-Administered Medications   Medication Dose Route Frequency Provider Last Rate Last Admin    menthol-zinc oxide (CALMOSEPTINE) 0.44-20.6 % ointment   Topical BID Hollie Boeck, MD   Given at 04/28/22 0545    niCARdipine (CARDENE) 25 mg in dextrose 5 % 250 mL infusion  2.5-15 mg/hr IntraVENous Continuous Yadira Yo DO 75 mL/hr at 04/28/22 0800 7.5 mg/hr at 04/28/22 0800    sodium phosphate 10.5 mmol in sodium chloride 0.9 % 250 mL IVPB  0.16 mmol/kg IntraVENous PRN Patience Walter, DO        Or    sodium phosphate 21 mmol in sodium chloride 0.9 % 250 mL IVPB  0.32 mmol/kg IntraVENous PRN Patience Walter, DO        insulin lispro (HUMALOG) injection vial 0-12 Units  0-12 Units SubCUTAneous TID WC Yadira Yo DO   6 Units at 04/28/22 0737    insulin lispro (HUMALOG) injection vial 0-6 Units  0-6 Units SubCUTAneous Nightly Yadira Yo DO   1 Units at 04/27/22 2115    vancomycin (VANCOCIN) oral solution 125 mg  125 mg Oral 4 times per day Yadira Yo DO   125 mg at 04/28/22 0545    hydrALAZINE (APRESOLINE) tablet 100 mg  100 mg Oral 3 times per day Patience Walter, DO   100 mg at 04/28/22 0546    propofol injection  5-50 mcg/kg/min IntraVENous Once Yadira Yo DO 11.2 mL/hr at 04/24/22 1505 25 mcg/kg/min at 04/24/22 1505    glucagon (rDNA) injection 1 mg  1 mg IntraMUSCular PRN Yadira Yo DO        dextrose 5 % solution  100 mL/hr IntraVENous PRN Yadira Yo DO        glucose chewable tablet 16 g  4 tablet Oral PRN Yadria Yo DO        dextrose bolus (hypoglycemia) 10% 125 mL  125 mL IntraVENous PRN Yadira Yo DO   Stopped at 04/25/22 5471    Or    dextrose bolus (hypoglycemia) 10% 250 mL  250 mL IntraVENous PRN Yadira Yo,         sodium chloride flush 0.9 % injection 5-40 mL  5-40 mL IntraVENous 2 times per day Yadira Yo DO   10 mL at 04/27/22 2014    sodium chloride flush 0.9 % injection 5-40 mL  5-40 mL IntraVENous PRN Dheeraj Giraldo, DO        0.9 % sodium chloride infusion   IntraVENous PRN Dheeraj Giraldo, DO        LORazepam (ATIVAN) injection 1 mg  1 mg IntraVENous Q5 Min PRN Dheeraj Giraldo, DO        heparin (porcine) injection 5,000 Units  5,000 Units SubCUTAneous TID Dheeraj Giraldo, DO   5,000 Units at 04/28/22 6107    ondansetron (ZOFRAN-ODT) disintegrating tablet 4 mg  4 mg Oral Q8H PRN Dheeraj Giraldo, DO        Or    ondansetron [facility]) injection 4 mg  4 mg IntraVENous Q6H PRN Dheeraj Giraldo, DO        polyethylene glycol [facility]) packet 17 g  17 g Oral Daily PRN Dheeraj Giraldo, DO        acetaminophen (TYLENOL) tablet 650 mg  650 mg Oral Q6H PRN Dheeraj Giraldo, DO   650 mg at 04/25/22 8846    Or    acetaminophen (TYLENOL) suppository 650 mg  650 mg Rectal Q6H PRN Dheeraj Giraldo, DO        levETIRAcetam (KEPPRA) 500 mg/100 mL IVPB  500 mg IntraVENous Q12H Dheeraj Giraldo, DO   Stopped at 04/28/22 0135    chlorhexidine (PERIDEX) 0.12 % solution 15 mL  15 mL Mouth/Throat BID Dheeraj Giraldo, DO   15 mL at 04/28/22 0737    famotidine (PEPCID) 20 mg in sodium chloride (PF) 10 mL injection  20 mg IntraVENous Daily Dheeraj Giraldo, DO   20 mg at 04/28/22 7184    propofol injection  5-50 mcg/kg/min IntraVENous Continuous Dheeraj Giraldo, DO   Stopped at 04/28/22 9340    magic butt cream   Topical Q4H PRN Dheeraj Giraldo, DO   Given at 04/25/22 0028    magnesium sulfate 1000 mg in dextrose 5% 100 mL IVPB  1,000 mg IntraVENous PRN Patience Walter, DO        potassium bicarb-citric acid (EFFER-K) effervescent tablet 40 mEq  40 mEq Oral PRN Patience Walter, DO        Or    potassium chloride 10 mEq/100 mL IVPB (Peripheral Line)  10 mEq IntraVENous PRN Patience Walter, DO   Stopped at 04/25/22 0936         niCARdipine 7.5 mg/hr (04/28/22 0800)    dextrose      sodium chloride      propofol Stopped (04/28/22 0323) Objective:   BP (!) 153/66   Pulse 91   Temp 97.7 °F (36.5 °C) (Temporal)   Resp 19   Ht 5' 6\" (1.676 m)   Wt 144 lb 11.2 oz (65.6 kg)   SpO2 99%   BMI 23.36 kg/m²     General: no acute distress  HEENT: normocephalic, atraumatic  Neck: supple, symmetrical, trachea midline   Lungs: clear to auscultation bilaterally   Cardiovascular: s1 and s2 normal  Abdomen: soft, positive bowel sounds  Extremities: no cyanosis   Neuro: intubated, continues to not require sedation    Recent Labs     04/26/22 0146 04/27/22 0241 04/28/22  0203   WBC 5.8 6.2 6.9   RBC 3.10* 3.37* 3.29*   HGB 9.1* 9.7* 9.6*   HCT 28.3* 30.8* 30.2*   MCV 91.3 91.4 91.8   MCH 29.4 28.8 29.2   MCHC 32.2* 31.5* 31.8*   * 114* 131     Recent Labs     04/26/22 0146 04/27/22 0241 04/28/22  0203   * 126* 123*   K 3.7 3.4* 3.4*   ANIONGAP 14 13 19   CL 93* 88* 83*   CO2 24 25 21*   BUN 31* 26* 32*   CREATININE 3.7* 2.8* 3.5*   GLUCOSE 123* 198* 235*   CALCIUM 8.6 8.5* 8.5*     Recent Labs     04/26/22 0146 04/27/22 0241 04/28/22  0203   MG 2.1 1.9 2.1   PHOS 3.6 3.8 4.9*     Recent Labs     04/26/22 0146 04/27/22 0241 04/28/22  0203   AST 13 14 13   ALT 9 9 9   BILITOT 0.4 0.4 0.4   ALKPHOS 95 98 109*     No results for input(s): PH, PO2, PCO2, HCO3, BE, O2SAT in the last 72 hours. No results for input(s): TROPONINI in the last 72 hours. No results for input(s): INR in the last 72 hours. No results for input(s): LACTA in the last 72 hours. Intake/Output Summary (Last 24 hours) at 4/28/2022 1147  Last data filed at 4/28/2022 0800  Gross per 24 hour   Intake 2562.49 ml   Output 123 ml   Net 2439.49 ml       No results found.      Assessment and Plan:   Seizure  Neurology following  Work-up in progress  AEDs per neurology  Seizure precautions     Ventilator dependent acute respiratory failure  Secondary to above  Titrate minute ventilation for pH 7.35  Follow ABG/CXR  Follow plateau pressure  Failed SBT 4/27/2022  Repeat     ESRD on HD  Renal following   Ongoing HD during my examination today     Medical noncompliance       C. difficile infection  Oral vancomycin     Tobacco dependence       [facility]  Since resolved  History of IDDM2  Meds on board  Follow Accu-Cheks/electrolytes     DVT prophylaxis  Heparin    Total critical care time: 47 minutes    Viki Tam MD   4/28/2022 11:47 AM

## 2022-04-28 NOTE — PROGRESS NOTES
Palliative Care Progress Note  4/28/2022 8:25 AM    Patient:  Maria Elena Vargas  YOB: 1970  Primary Care Physician: ROMINA Nunn  Advance Directive: Full Code  Admit Date: 4/24/2022       Hospital Day: 4  Portions of this note have been copied forward, however, changed to reflect the most current clinical status of this patient. CHIEF COMPLAINT/REASON FOR CONSULTATION goals of care    SUBJECTIVE:  Ms. Teresita Salazar is currently receiving dialysis. Remains intubated without sedation. More movement and requiring restraints for safety after reaching for ETT but not following commands. Review of Systems:   14 point review of systems is negative except as specifically addressed above. Objective:   VITALS:  /65   Pulse 85   Temp 97.7 °F (36.5 °C) (Temporal)   Resp 18   Ht 5' 6\" (1.676 m)   Wt 144 lb 11.2 oz (65.6 kg)   SpO2 98%   BMI 23.36 kg/m²   24HR INTAKE/OUTPUT:      Intake/Output Summary (Last 24 hours) at 4/28/2022 0825  Last data filed at 4/28/2022 0800  Gross per 24 hour   Intake 2757.2 ml   Output 123 ml   Net 2634.2 ml       General appearance: 48 yo female, chronically ill appearing, intubated  Head: Normocephalic, without obvious abnormality, atraumatic  Eyes: conjunctivae/corneas clear. PERRL  Ears: normal external ears and nose  Neck: no JVD, supple, symmetrical, trachea midline   Lungs:  Diminished to auscultation bilaterally, mechanically ventilated, equal chest rise  Heart:  Regular rate and rhythm, S1, S2 normal, no murmur  Abdomen: Soft, non-tender, rounded, normal bowel sounds, FMS in place with diarrhea noted  Extremities: No lower extremity edema,  No erythema, no tenderness to palpation, pedal pulses present  Skin: Pale, warm, dry  Neurologic: Intubated, minimally responsive without sedation. Attempts to open eyes to voice, withdrawals extremities to pain intermittently but does not follow commands.      Medications:      niCARdipine 7.5 mg/hr (04/28/22 0800)   Hayden Sanderson dextrose      sodium chloride      propofol Stopped (04/28/22 0323)      menthol-zinc oxide   Topical BID    insulin lispro  0-12 Units SubCUTAneous TID WC    insulin lispro  0-6 Units SubCUTAneous Nightly    vancomycin  125 mg Oral 4 times per day    hydrALAZINE  100 mg Oral 3 times per day    propofol  5-50 mcg/kg/min IntraVENous Once    sodium chloride flush  5-40 mL IntraVENous 2 times per day    heparin (porcine)  5,000 Units SubCUTAneous TID    levetiracetam  500 mg IntraVENous Q12H    chlorhexidine  15 mL Mouth/Throat BID    famotidine (PEPCID) injection  20 mg IntraVENous Daily     sodium phosphate IVPB **OR** sodium phosphate IVPB, glucagon (rDNA), dextrose, glucose, dextrose bolus (hypoglycemia) **OR** dextrose bolus (hypoglycemia), sodium chloride flush, sodium chloride, LORazepam, ondansetron **OR** ondansetron, polyethylene glycol, acetaminophen **OR** acetaminophen, magic butt cream, magnesium sulfate, potassium alternative oral replacement **OR** potassium chloride  Diet NPO  ADULT TUBE FEEDING; Orogastric; Peptide Based; Continuous; 30; Yes; 5; Q 6 hours; 54; 0; Other (specify); no free water flush at this time     Lab and other Data:     Recent Labs     04/26/22 0146 04/27/22 0241 04/28/22  0203   WBC 5.8 6.2 6.9   HGB 9.1* 9.7* 9.6*   * 114* 131     Recent Labs     04/26/22 0146 04/27/22 0241 04/28/22  0203   * 126* 123*   K 3.7 3.4* 3.4*   CL 93* 88* 83*   CO2 24 25 21*   BUN 31* 26* 32*   CREATININE 3.7* 2.8* 3.5*   GLUCOSE 123* 198* 235*     Recent Labs     04/26/22 0146 04/27/22 0241 04/28/22  0203   AST 13 14 13   ALT 9 9 9   BILITOT 0.4 0.4 0.4   ALKPHOS 95 98 109*       RAD:   CT Head WO Contrast  Result Date: 4/24/2022  1. No acute intracranial process. Signed by Dr Lizeth Ricci    XR CHEST PORTABLE  Result Date: 4/24/2022  1. Endotracheal tube is deep, tip essentially touching the krysta. Lungs are clear and well expanded.  I would recommend 2-3 cm withdrawal for a more optimal positioning. The results of this exam were discussed with Keerthi Villeda (ICU nursing) on 4/24/2022 at 1640 hours Signed by Dr Meg Rodas    XR CHEST PORTABLE  Result Date: 4/24/2022  1. ET tube is in good position. Lungs are clear and well expanded. 2. Enteric tube curls on itself in the mid esophagus before extending back up into the throat. This will need repositioned. The results of this exam were discussed with Dr. Carrie Bloom on 4/24/2022 at 1329 hours Signed by Dr Meg Rodas    XR CHEST PORTABLE  Result Date: 4/24/2022  No acute cardiopulmonary process. Signed by Dr Meg Rodas  Assessment/Plan   Principal Problem:    Seizure [facility])  Active Problems:    ESRD (end stage renal disease) on dialysis ([facility] [address] [facility]    Hyponatremia    Uncontrolled hypertension    Palliative care patient    Uncontrolled type 2 diabetes mellitus with hyperosmolar nonketotic hyperglycemia ([facility] [address]    Acute hypoxemic respiratory failure ([facility] [address]    UTI (urinary tract infection)  Resolved Problems:    * No resolved hospital problems. *      Visit Summary:  Chart reviewed, patient discussed with nursing staff. Reviewed health issues, work up and treatment plan as well as factors that lead to hospitalization. Report obtained from RN with no acute events overnight. She is tolerating dialysis treatment and remains on Cardene for BP control. Exam remains largely unchanged and continues to be minimally responsive without sedation. No seizure activity on cEEG overnight per neuro. I attempted to call patient's significant other, Maria Guadalupe Cates, to update on patient's status and plan of care. He did not answer and I left my contact information on  for call back. Discussed pt with RUPERTO and hospitalist. Will follow    Recommendations:   1. Palliative care- GOC continue all medical treatments and monitor for improvement. D/c planning based on hospital course. Possible LTACH referral but unable to reach her decision maker, RUPERTO following.  Code Status- FULL CODE    2. Acute hypoxemic respiratory failure- mgmt per hospitalist.  Vent support. Wean as tolerated. Failed SBT 4/28/22    3. Hyperglycemia-improved, insulin infusion transitioned to subcu. 4.  S/p tonic-clonic seizure- neuro following. Keppra continued. Continuous EEG monitoring remains. MRI brain when able    5. UTI- IV antibiotics continue. Follow cultures    6. ESRD on maintenance dialysis- nephrology following. Labs noted. HD today    7. HTN- mgmt per hospitalist. cardene infusion. 8. Hyponatremia- labs improving. 9. Cdiff- FMS in place with diarrhea. Oral vanc per OG    Thank you for consulting Palliative Care and allowing us to participate in the care of this patient.    Time Spent Counseling > 50%:  YES                                   Total Time Spent with patient/family counseling, workup/treatment review, counseling and placement of orders/preparation of this note: 26 minutes    Electronically signed by ROMINA Magdaleno CNP on 4/28/2022 at 8:25 AM    (Please note that portions of this note were completed with a voice recognition program.  Teresita Ross made to edit the dictations but occasionally words are mis-transcribed.)

## 2022-04-28 NOTE — PROGRESS NOTES
Comprehensive Nutrition Assessment    Type and Reason for Visit:  Reassess    Nutrition Recommendations/Plan:   1. Continue to work toward goal rate of EN     Malnutrition Assessment:  Malnutrition Status: At risk for malnutrition (Comment) (04/28/22 0981)    Context:  Acute Illness     Findings of the 6 clinical characteristics of malnutrition:  Energy Intake:  Mild decrease in energy intake (Comment)  Weight Loss:  No significant weight loss     Body Fat Loss:  No significant body fat loss     Muscle Mass Loss:  No significant muscle mass loss    Fluid Accumulation:  No significant fluid accumulation Extremities   Strength:  Not Performed    Nutrition Assessment:    EN has been changed to Vital AF 1.2 and is currently running at 30ml/hr. Water flushes have been stopped. Dialysis treatment is being given at time of visit. Na+ level decreased back to 123    Nutrition Related Findings:    on vent Wound Type: Deep Tissue Injury       Current Nutrition Intake & Therapies:    Average Meal Intake: NPO  Average Supplements Intake: NPO  Current Tube Feeding (TF) Orders:  · Feeding Route: Orogastric  · Formula: Peptide Based  · Schedule: Continuous  · Feeding Regimen: Vital AF 1.2 goal rate 54ml/hr  · Additives/Modulars: None  · Water Flushes: None  · Current TF & Flush Orders Provides: Vital AF 1.2 @ 30ml/hr = 864 kcals with 54g protein, 79g CHO and 583 ml free water from formula  · Goal TF & Flush Orders Provides: Vital AF 1.2 @ 54ml/hr = 1555 kcals with 97g protein, 142g CHO and 1083ml free water      Anthropometric Measures:  Height: 5' 6\" (167.6 cm)  Ideal Body Weight (IBW): 130 lbs (59 kg)    Admission Body Weight: 165 lb (74.8 kg) (estimated)  Current Body Weight: 144 lb 11.2 oz (65.6 kg), 111.3 % IBW. Current BMI (kg/m2): 23.4  Usual Body Weight: 144 lb (65.3 kg) (1/2022)  % Weight Change (Calculated): 0.5  BMI Categories: Normal Weight (BMI 18.5-24. 9)    Estimated Daily Nutrient Needs:  Energy Requirements Based On: Kcal/kg  Weight Used for Energy Requirements: Current  Energy (kcal/day): 4392-3469 kcals (20-25 kcals/kg)  Weight Used for Protein Requirements: Current  Protein (g/day): 98g  Method Used for Fluid Requirements: 1 ml/kcal  Fluid (ml/day): 8060-5679 ml    Nutrition Diagnosis:   · Inadequate oral intake related to acute injury/trauma,impaired respiratory function as evidenced by NPO or clear liquid status due to medical condition,intubation,nutrition support - enteral nutrition      Nutrition Interventions:   Food and/or Nutrient Delivery: Continue Current Tube Feeding  Nutrition Education/Counseling: No recommendation at this time  Coordination of Nutrition Care: Continue to monitor while inpatient  Plan of Care discussed with: RN    Goals:  Previous Goal Met: Progressing toward Goal(s)  Goals: Meet at least 75% of estimated needs,Initiate nutrition support       Nutrition Monitoring and Evaluation:   Behavioral-Environmental Outcomes: None Identified  Food/Nutrient Intake Outcomes: Enteral Nutrition Intake/Tolerance  Physical Signs/Symptoms Outcomes: Biochemical Data,Weight,Skin,Nutrition Focused Physical Findings,Fluid Status or Edema    Discharge Planning:     Too soon to determine     Clifford Melendez, MS, RD, LD  Contact: [phone]

## 2022-04-28 NOTE — CARE COORDINATION
Spoke with patient's s/o RUBIA Greenwood, he is in agreement for patient to be transferred to [facility] to continue her care. Referral made and to initiate precert.   [facility]  Ph: [phone]  Fa: [phone]   Referrals: [phone] [facility])  Electronically signed by Vinod Anderson RN on 4/28/2022 at 3:11 PM

## 2022-04-28 NOTE — PROCEDURES
DAILY ADULT INPATIENT VIDEO-EEG EVENT MONITORING REPORT    Patient:   John Pedersen  MR#:    [id number]  Room #:    INPATIENT   YOB: 1970  Study Date:    April 27, 2022  Primary Physician:     ROMINA Ponce   Referring Physician:   Nicho Clancy DO      CLINICAL INFORMATION:     This patient is a 47 y.o. female with a history of seizures. The specific reason we are doing this study is for event clarification and to evaluate for an underlying seizure focus and to exclude subclinical status epilepticus. MEDICATIONS:     See MAR    RECORDING CONDITIONS:     Continuous video-EEG monitoring was performed with [employer] equiptment. The recorded period occurred on April 27, 2022. Electrodes were applied according to the International 10-20 System. Data were obtained, stored, and interpreted according to ACNS guidelines (J Clin Neurophysiol 2006;23(2):) utilizing referential montage recording, with reformatting to longitudinal, transverse bipolar, and referential montages as necessary for interpretation, along with digital/automated EEG analysis. Physician access and review of the EEG and Video data occurred actively during the recording. RECORDING DURATION:   24 hours. DESCRIPTION OF BASELINE RECORD:    The background is somewhat asymmetric with low voltage slowing seen over the left hemisphere. Intermixed burst suppression is noted throughout the recording which is likely due to underlying propofol use. No overt electrographic seizure activity was noted. No overt epileptiform abnormalities were seen. Muscle, motion, and eye movement artifacts were occasionally noted. DESCRIPTION OF EVENTS:    April 27, 2022  The Interictal files were as described in the baseline recording.    Digital spike and event analysis reviewed and showed background asymmetry with lower voltage slowing seen over the left hemisphere and more sharply contoured higher voltage slowing seen over the right hemisphere. Nothing clearly evolved into electrographic seizure activity. No seizures were recorded. E.E.G. INTERPRETATION:    Abnormal EEG due to background asymmetry with lower voltage slowing seen over the left hemisphere and higher voltage more sharply contoured slowing seen over the left hemisphere. This appeared to improve during the latter portion of the study. No overt electrographic seizure activity was seen. CLINICAL CORRELATION:    Continue cEEG monitoring.          Matty Wesley DO  Board Certified Neurologist

## 2022-04-28 NOTE — PROGRESS NOTES
Nephrology ([facility]) Progress Note    Patient:  Danitza Helton  YOB: 1970  Date of Service: 4/28/2022  MRN: [id number]   Acct: [de-identified]   Primary Care Physician: ROMINA Strong  Advance Directive: Full Code  Admit Date: 4/24/2022       Hospital Day: 4  Referring Provider: Maritza Allred MD    Patient independently seen and examined, Chart, Consults, Notes, Operative notes, Labs, Cardiology, and Radiology studies reviewed as available. Chief complaint: Altered mental status/end-stage renal disease    Subjective:  Danitza Helton is a 47 y.o. female for whom we were consulted for evaluation and treatment of end-stage renal disease. Patient also has history of seizure disorder, type 2 diabetes, hypertension, frequent hospitalization with noncompliance and poorly controlled diabetes. Patient was brought to the emergency room after she was found to have unresponsiveness and witnessed grand mal seizure. She was brought in by ambulance. On arrival in the emergency room she was found to be severely hyperglycemic and had a urinary tract infection. Patient was intubated, sedated and currently being treated by neurology for grand mal seizure. She is also receiving IV antibiotics and insulin drip. Her blood sugar control has significantly improved. On April 25, she has received emergent hemodialysis treatment for correction of volume status as well as hyponatremia. She is now moved from ICU to CCU. This morning patient is intubated/sedated. Continuous EEG monitoring did not show any seizure activity.   .    Allergies:  Penicillins, Lamisil advanced [terbinafine], Reglan [metoclopramide], and Stadol [butorphanol]    Medicines:  Current Facility-Administered Medications   Medication Dose Route Frequency Provider Last Rate Last Admin    menthol-zinc oxide (CALMOSEPTINE) 0.44-20.6 % ointment   Topical BID Maritza Allred MD   Given at 04/28/22 0545    niCARdipine (CARDENE) 25 mg in dextrose 5 % 250 mL infusion  2.5-15 mg/hr IntraVENous Continuous Twyla Calamity, DO 75 mL/hr at 04/28/22 0800 7.5 mg/hr at 04/28/22 0800    sodium phosphate 10.5 mmol in sodium chloride 0.9 % 250 mL IVPB  0.16 mmol/kg IntraVENous PRN Patience Walter, DO        Or    sodium phosphate 21 mmol in sodium chloride 0.9 % 250 mL IVPB  0.32 mmol/kg IntraVENous PRN Patience Walter, DO        insulin lispro (HUMALOG) injection vial 0-12 Units  0-12 Units SubCUTAneous TID WC Twyla Calamity, DO   6 Units at 04/28/22 0737    insulin lispro (HUMALOG) injection vial 0-6 Units  0-6 Units SubCUTAneous Nightly Twyla Calamity, DO   1 Units at 04/27/22 2115    vancomycin (VANCOCIN) oral solution 125 mg  125 mg Oral 4 times per day Twyla Calamity, DO   125 mg at 04/28/22 0545    hydrALAZINE (APRESOLINE) tablet 100 mg  100 mg Oral 3 times per day Patience Walter, DO   100 mg at 04/28/22 0546    propofol injection  5-50 mcg/kg/min IntraVENous Once Twyla Calamity, DO 11.2 mL/hr at 04/24/22 1505 25 mcg/kg/min at 04/24/22 1505    glucagon (rDNA) injection 1 mg  1 mg IntraMUSCular PRN Twyla Calamity, DO        dextrose 5 % solution  100 mL/hr IntraVENous PRN Twyla Calamity, DO        glucose chewable tablet 16 g  4 tablet Oral PRN Twyla Calamity, DO        dextrose bolus (hypoglycemia) 10% 125 mL  125 mL IntraVENous PRN Twyla Calamity, DO   Stopped at 04/25/22 8262    Or    dextrose bolus (hypoglycemia) 10% 250 mL  250 mL IntraVENous PRN Twyla Calamity, DO        sodium chloride flush 0.9 % injection 5-40 mL  5-40 mL IntraVENous 2 times per day Twyla Calamity, DO   10 mL at 04/27/22 2014    sodium chloride flush 0.9 % injection 5-40 mL  5-40 mL IntraVENous PRN Twyla Calamity, DO        0.9 % sodium chloride infusion   IntraVENous PRN Twyla Calamity, DO        LORazepam (ATIVAN) injection 1 mg  1 mg IntraVENous Q5 Min PRN Twyla Jean Baptiste, DO        heparin (porcine) injection 5,000 Units  5,000 Units SubCUTAneous TID Fannie Edison, DO   5,000 Units at 04/28/22 4326    ondansetron (ZOFRAN-ODT) disintegrating tablet 4 mg  4 mg Oral Q8H PRN Fannie Edison, DO        Or    ondansetron [facility]) injection 4 mg  4 mg IntraVENous Q6H PRN Fannie Edison, DO        polyethylene glycol [facility]) packet 17 g  17 g Oral Daily PRN Fannie Edison, DO        acetaminophen (TYLENOL) tablet 650 mg  650 mg Oral Q6H PRN Fannie Edison, DO   650 mg at 04/25/22 0851    Or    acetaminophen (TYLENOL) suppository 650 mg  650 mg Rectal Q6H PRN Fannie Edison, DO        levETIRAcetam (KEPPRA) 500 mg/100 mL IVPB  500 mg IntraVENous Q12H Fannie Edison, DO   Stopped at 04/28/22 0135    chlorhexidine (PERIDEX) 0.12 % solution 15 mL  15 mL Mouth/Throat BID Fannie Edison, DO   15 mL at 04/28/22 4253    famotidine (PEPCID) 20 mg in sodium chloride (PF) 10 mL injection  20 mg IntraVENous Daily Fannie Edison, DO   20 mg at 04/28/22 3185    propofol injection  5-50 mcg/kg/min IntraVENous Continuous Fannie Edison, DO   Stopped at 04/28/22 7855    magic butt cream   Topical Q4H PRN Fannie Edison, DO   Given at 04/25/22 0028    magnesium sulfate 1000 mg in dextrose 5% 100 mL IVPB  1,000 mg IntraVENous PRN Patience Walter, DO        potassium bicarb-citric acid (EFFER-K) effervescent tablet 40 mEq  40 mEq Oral PRN Patience Walter, DO        Or    potassium chloride 10 mEq/100 mL IVPB (Peripheral Line)  10 mEq IntraVENous PRN Alex Mandril, DO   Stopped at 04/25/22 8061       Past Medical History:  Past Medical History:   Diagnosis Date    Arthritis     Chronic kidney disease, stage 5, kidney failure (HCC)     Closed fracture of right shoulder girdle, with routine healing, subsequent encounter     DM (diabetes mellitus) type II controlled with renal manifestation ([facility] [address] 06/1993    Gastroparesis     GERD (gastroesophageal reflux disease)     Hemodialysis patient ([facility] [address]     dialysis on tues, thur, and sat at [address] [facility] Hypertension     Hypothyroid     Nasal congestion     recent    Neuropathy     Noncompliance with medications     Palliative care patient 10/08/2019    Restless leg syndrome        Past Surgical History:  Past Surgical History:   Procedure Laterality Date    BREAST SURGERY Left 2008    infected milk gland removed    BREAST SURGERY Right 5/25/2021    WEDGE EXCISION RIGHT BREAST DUCT WITH LACRIMAL DUCT PROBE, PEC BLOCK performed by Candi Felton MD at [facility]      2008    COLONOSCOPY Left 9/17/2020    Dr Tiffanie Bolivar hepatic flexure-Severe tortuosity with severe spasming, 1 yr recall    DIALYSIS FISTULA CREATION Left 1/25/2019    REVISION LEFT UPPER EXTREMITY AV ACCESS WITH LEFT BRACHIAL ARTERY TO LEFT AXILLARY VEIN WITH  INTERPOSITIONAL ARTEGRAFT   performed by Av Meraz MD at [address]  2012    [address] Right 1/25/2019    INSERTION OF RIGHT INTERNAL JUGULAR HEMODIALYSIS CATHETER performed by Av Meraz MD at [address] [facility]  08/17/2018    1.  Moderately increased stainable iron identified by special stain in Kupffer cells and occasional hepatocytes. Negative for evidence of significant portal or lobular inflammation. Negative for evidence of significant fibrosis    OH COLONOSCOPY W/BIOPSY SINGLE/MULTIPLE N/A 10/20/2017    Dr Rita Vinson prep-Tubular AP (-) dysplasia x 2--3 yr recall    OH EGD TRANSORAL BIOPSY SINGLE/MULTIPLE N/A 12/27/2016    Dr Leslie Lincoln EGD TRANSORAL BIOPSY SINGLE/MULTIPLE N/A 10/20/2017    Dr Bang Rivera (-)   [facility] VASCULAR SURGERY Left 2016    fistula by Dr Rebecca Teran at [address]  10/02/2017    S[facility]. Left upper fistulograms/venograms, balloon angioplasty cephalic vein arch 18X81 conquest.    VASCULAR SURGERY  08/2018 Currently     Partners: Male   Other Topics Concern    Not on file   Social History Narrative    Not on file     Social Determinants of Health     Financial Resource Strain:     Difficulty of Paying Living Expenses: Not on file   Food Insecurity:     Worried About [address] in the Last Year: Not on file    Kendy of Food in the Last Year: Not on file   Transportation Needs:     Lack of Transportation (Medical): Not on file    Lack of Transportation (Non-Medical):  Not on file   Physical Activity:     Days of Exercise per Week: Not on file    Minutes of Exercise per Session: Not on file   Stress:     Feeling of Stress : Not on file   Social Connections:     Frequency of Communication with Friends and Family: Not on file    Frequency of Social Gatherings with Friends and Family: Not on file    Attends [employer] Services: Not on file    Active Member of Clubs or Organizations: Not on file    Attends Club or Organization Meetings: Not on file    Marital Status: Not on file   Intimate Partner Violence:     Fear of Current or Ex-Partner: Not on file    Emotionally Abused: Not on file    Physically Abused: Not on file    Sexually Abused: Not on file   Housing Stability:     Unable to Pay for Housing in the Last Year: Not on file    Number of [place] in the Last Year: Not on file    Unstable Housing in the Last Year: Not on file         Review of Systems:  Review of system is unobtainable as she is intubated and sedated.}       Objective:  Patient Vitals for the past 24 hrs:   BP Temp Temp src Pulse Resp SpO2   04/28/22 0800 138/65 97.7 °F (36.5 °C) Temporal 85 18 98 %   04/28/22 0700 (!) 141/61   84 17 99 %   04/28/22 0624    84 18 99 %   04/28/22 0502 (!) 150/64   82 16 99 %   04/28/22 0401 (!) 150/62 97.5 °F (36.4 °C) Temporal 86 16 99 %   04/28/22 0302 (!) 141/66   83 16 100 %   04/28/22 0242    90 16 100 %   04/28/22 0202 (!) 144/64   83 16 100 %   04/28/22 0101 121/80   88 16 99 %   04/28/22 0001 (!) 143/90 97.6 °F (36.4 °C) Temporal 87 16 96 %   04/27/22 2303 (!) 156/73   86 16 99 %   04/27/22 2202 (!) 154/68   84 16 97 %   04/27/22 2133    86 16 99 %   04/27/22 2115 (!) 161/60        04/27/22 2102 (!) 161/60   84 17 100 %   04/27/22 2001 (!) 169/66 97.5 °F (36.4 °C) Temporal 85 14 100 %   04/27/22 1819    90 17 100 %   04/27/22 1626    84 16 97 %   04/27/22 1600 (!) 151/65 97.7 °F (36.5 °C) Temporal 82 16 98 %   04/27/22 1500 (!) 167/70   82 18 100 %   04/27/22 1414    81 16 100 %   04/27/22 1400 (!) 166/71   81 16 99 %   04/27/22 1300 (!) 155/123   82 17 100 %   04/27/22 1200 (!) 158/69 98.1 °F (36.7 °C) Temporal 82 14 98 %   04/27/22 1100 (!) 151/67   79 16 99 %   04/27/22 1010    81 16 100 %   04/27/22 1000 (!) 145/65   81 16 99 %   04/27/22 0900 (!) 148/57   82 16 100 %       Intake/Output Summary (Last 24 hours) at 4/28/2022 0849  Last data filed at 4/28/2022 0800  Gross per 24 hour   Intake 2562.49 ml   Output 123 ml   Net 2439.49 ml     General: Intubated/sedated. HEENT: Normocephalic atraumatic head/orotracheal intubation  Neck: Supple without JVD or carotid bruits  Chest: Bilateral clear breath sounds. CVS: Regular rate and rhythm S1 and S2. Abdominal: Soft and nondistended positive bowel sounds. Extremities: No pedal edema.   Skin: warm and dry without rash    Labs:  BMP:   Recent Labs     04/26/22  0146 04/27/22  0241 04/28/22  0203   * 126* 123*   K 3.7 3.4* 3.4*   CL 93* 88* 83*   CO2 24 25 21*   PHOS 3.6 3.8 4.9*   BUN 31* 26* 32*   CREATININE 3.7* 2.8* 3.5*   CALCIUM 8.6 8.5* 8.5*     CBC:   Recent Labs     04/26/22  0146 04/27/22  0241 04/28/22  0203   WBC 5.8 6.2 6.9   HGB 9.1* 9.7* 9.6*   HCT 28.3* 30.8* 30.2*   MCV 91.3 91.4 91.8   * 114* 131     LIVER PROFILE:   Recent Labs     04/26/22  0146 04/27/22  0241 04/28/22  0203   AST 13 14 13   ALT 9 9 9   BILITOT 0.4 0.4 0.4   ALKPHOS 95 98 109* PT/INR: No results for input(s): PROTIME, INR in the last 72 hours. APTT: No results for input(s): APTT in the last 72 hours. BNP:  No results for input(s): BNP in the last 72 hours. Ionized Calcium:No results for input(s): IONCA in the last 72 hours. Magnesium:  Recent Labs     04/26/22  0146 04/27/22 0241 04/28/22  0203   MG 2.1 1.9 2.1     Phosphorus:  Recent Labs     04/26/22  0146 04/27/22 0241 04/28/22  0203   PHOS 3.6 3.8 4.9*     HgbA1C:   No results for input(s): LABA1C in the last 72 hours. Hepatic:   Recent Labs     04/26/22  0146 04/27/22 0241 04/28/22  0203   ALKPHOS 95 98 109*   ALT 9 9 9   AST 13 14 13   PROT 5.7* 5.8* 5.9*   BILITOT 0.4 0.4 0.4   LABALBU 2.6* 2.9* 3.3*     Lactic Acid: No results for input(s): LACTA in the last 72 hours. Troponin: No results for input(s): CKTOTAL, CKMB, TROPONINT in the last 72 hours. ABGs:   Lab Results   Component Value Date    PHART 7.330 04/24/2022    PO2ART 389.0 04/24/2022    VGM5GGZ 41.0 04/24/2022     CRP:  No results for input(s): CRP in the last 72 hours. Sed Rate:  No results for input(s): SEDRATE in the last 72 hours. Culture Results:   Blood Culture Recent:   Recent Labs     04/24/22  1447   BC No Growth to date. Any change in status will be called. Urine Culture Recent :   Recent Labs     04/24/22  1354   LABURIN No growth at 36-48 hours       Radiology reports as per the Radiologist  Radiology: CT Head WO Contrast    Result Date: 4/24/2022  CT HEAD WO CONTRAST 4/24/2022 2:01 PM HISTORY: Altered mental status COMPARISON: CT scan dated 11/18/2021 DOSE LENGTH PRODUCT: 766 mGy cm TECHNIQUE: Helical tomographic images of the brain were obtained without the use of intravenous contrast. Automated exposure control was also utilized to decrease patient radiation dose. FINDINGS: There is no evidence of evolving large vascular territory infarct. No visualized intra-axial or extra-axial hemorrhage. No mass lesion is identified.  Normal size and configuration of the ventricular system. The basal cisterns are symmetric. Posterior fossa structures are unremarkable. The included orbits and their contents are unremarkable. Chronic paranasal sinus disease on the left. The visualized osseous structures and overlying soft tissues of the skull and face are unremarkable. 1. No acute intracranial process. Signed by Dr Jose Francois    Result Date: 4/24/2022  XR CHEST PORTABLE 4/24/2022 4:27 PM HISTORY: ET tube, enteric tube  Technique: Single AP view of the chest COMPARISONS: 4/24/2022 FINDINGS: Endotracheal tube is identified with tip essentially touching the krysta. Enteric tube courses into the stomach with tip curled in the fundus back towards the gastroesophageal junction. The lungs are clear and well expanded. Heart size is stable. Pulmonary vasculature are nondilated. No pleural effusion or pneumothorax. 1. Endotracheal tube is deep, tip essentially touching the krysta. Lungs are clear and well expanded. I would recommend 2-3 cm withdrawal for a more optimal positioning. The results of this exam were discussed with Samuel Flores (ICU nursing) on 4/24/2022 at 1640 hours Signed by Dr Jesus Restrepo    XR CHEST PORTABLE    Result Date: 4/24/2022  XR CHEST PORTABLE 4/24/2022 1:17 PM HISTORY: ET tube placement  Technique: Single AP view of the chest COMPARISONS: 4/24/2022 FINDINGS: Status post endotracheal intubation. ET tube is in good position. Lungs are well-expanded. Enteric tube curls on itself in the midesophagus and then extends back up into the throat. Heart size is stable. Pulmonary vasculature are nondilated. 1. ET tube is in good position. Lungs are clear and well expanded. 2. Enteric tube curls on itself in the mid esophagus before extending back up into the throat. This will need repositioned.  The results of this exam were discussed with  [facility] on 4/24/2022 at 1329 hours Signed by Dr Jesus Restrepo    XR CHEST PORTABLE    Result Date: 4/24/2022  XR CHEST PORTABLE 4/24/2022 12:34 PM HISTORY: Altered mental status  Technique: Single AP view of the chest COMPARISONS: Chest exam dated 4/1/2022 FINDINGS: No lung consolidation. No pleural effusion or pneumothorax. Cardiomediastinal silhouette and pulmonary vasculature are unremarkable. No acute bony abnormality. Left subclavian region vascular stent with additional stent projecting over the left humerus. No acute cardiopulmonary process. Signed by Dr Yamsine Solis    Result Date: 4/1/2022  XR CHEST PORTABLE 4/1/2022 12:35 PM HISTORY: Shortness of breath  Technique: Single AP view of the chest COMPARISONS: Chest exam dated 1/22/2022 FINDINGS: Cardiomegaly with central pulmonary vascular congestion, interstitial and alveolar edema as well as bilateral moderate layering pleural effusions. No pneumothorax. No acute bony abnormality. 1. Volume overload with interstitial and carie pulmonary edema as well as bilateral moderate pleural effusions. Signed by Dr Aniya Trevizo   1. End-stage renal disease/dialysis dependent. 2.  Type II diabetic nephropathy. 3.  Acute respiratory failure/intubated. 4.  Status post grand mal seizure  5. Severe hyponatremia due to volume overload. 6.  Anemia of chronic kidney disease. 7.  Urinary tract infection. 8.  Pulmonary edema  9. Hypokalemia      Plan:  1. Routine hemodialysis treatment with large UF  2. Continue IV antibiotics. 3.  Possible ventilator weaning postdialysis.   4.  Plan was discussed with critical care nurse    Hemal Mcnulty MD  04/28/22  8:49 AM

## 2022-04-28 NOTE — PLAN OF CARE
Nutrition Problem #1: Inadequate oral intake  Intervention: Food and/or Nutrient Delivery: Continue Current Tube Feeding  Nutritional

## 2022-04-29 ENCOUNTER — APPOINTMENT (OUTPATIENT)
Dept: MRI IMAGING | Age: 52
DRG: 004 | End: 2022-04-29
Payer: MEDICARE

## 2022-04-29 LAB
ALBUMIN SERPL-MCNC: 3 G/DL (ref 3.5–5.2)
ALP BLD-CCNC: 96 U/L (ref 35–104)
ALT SERPL-CCNC: 7 U/L (ref 5–33)
ANION GAP SERPL CALCULATED.3IONS-SCNC: 21 MMOL/L (ref 7–19)
AST SERPL-CCNC: 14 U/L (ref 5–32)
BASOPHILS ABSOLUTE: 0.1 K/UL (ref 0–0.2)
BASOPHILS RELATIVE PERCENT: 0.8 % (ref 0–1)
BILIRUB SERPL-MCNC: 0.4 MG/DL (ref 0.2–1.2)
BLOOD CULTURE, ROUTINE: NORMAL
BUN BLDV-MCNC: 19 MG/DL (ref 6–20)
CALCIUM SERPL-MCNC: 8.2 MG/DL (ref 8.6–10)
CHLORIDE BLD-SCNC: 90 MMOL/L (ref 98–111)
CO2: 17 MMOL/L (ref 22–29)
CREAT SERPL-MCNC: 2.7 MG/DL (ref 0.5–0.9)
CULTURE, BLOOD 2: NORMAL
EOSINOPHILS ABSOLUTE: 0.1 K/UL (ref 0–0.6)
EOSINOPHILS RELATIVE PERCENT: 0.9 % (ref 0–5)
GFR AFRICAN AMERICAN: 22
GFR NON-AFRICAN AMERICAN: 18
GLUCOSE BLD-MCNC: 201 MG/DL (ref 70–99)
GLUCOSE BLD-MCNC: 209 MG/DL (ref 70–99)
GLUCOSE BLD-MCNC: 249 MG/DL (ref 70–99)
GLUCOSE BLD-MCNC: 288 MG/DL (ref 70–99)
GLUCOSE BLD-MCNC: 305 MG/DL (ref 74–109)
HCT VFR BLD CALC: 31.7 % (ref 37–47)
HEMOGLOBIN: 9.6 G/DL (ref 12–16)
IMMATURE GRANULOCYTES #: 0 K/UL
LV EF: 58 %
LVEF MODALITY: NORMAL
LYMPHOCYTES ABSOLUTE: 0.6 K/UL (ref 1.1–4.5)
LYMPHOCYTES RELATIVE PERCENT: 9.3 % (ref 20–40)
MAGNESIUM: 2.3 MG/DL (ref 1.6–2.6)
MCH RBC QN AUTO: 28.7 PG (ref 27–31)
MCHC RBC AUTO-ENTMCNC: 30.3 G/DL (ref 33–37)
MCV RBC AUTO: 94.6 FL (ref 81–99)
MONOCYTES ABSOLUTE: 0.4 K/UL (ref 0–0.9)
MONOCYTES RELATIVE PERCENT: 6.7 % (ref 0–10)
NEUTROPHILS ABSOLUTE: 5.4 K/UL (ref 1.5–7.5)
NEUTROPHILS RELATIVE PERCENT: 82.1 % (ref 50–65)
PDW BLD-RTO: 18.7 % (ref 11.5–14.5)
PERFORMED ON: ABNORMAL
PHOSPHORUS: 3.6 MG/DL (ref 2.5–4.5)
PLATELET # BLD: 110 K/UL (ref 130–400)
PMV BLD AUTO: 11.5 FL (ref 9.4–12.3)
POTASSIUM SERPL-SCNC: 3.9 MMOL/L (ref 3.5–5)
RBC # BLD: 3.35 M/UL (ref 4.2–5.4)
REASON FOR REJECTION: NORMAL
REJECTED TEST: NORMAL
SODIUM BLD-SCNC: 128 MMOL/L (ref 136–145)
TOTAL PROTEIN: 5.6 G/DL (ref 6.6–8.7)
WBC # BLD: 6.5 K/UL (ref 4.8–10.8)

## 2022-04-29 PROCEDURE — 84100 ASSAY OF PHOSPHORUS: CPT

## 2022-04-29 PROCEDURE — 2500000003 HC RX 250 WO HCPCS: Performed by: INTERNAL MEDICINE

## 2022-04-29 PROCEDURE — 2580000003 HC RX 258: Performed by: PSYCHIATRY & NEUROLOGY

## 2022-04-29 PROCEDURE — 6360000002 HC RX W HCPCS: Performed by: INTERNAL MEDICINE

## 2022-04-29 PROCEDURE — 83735 ASSAY OF MAGNESIUM: CPT

## 2022-04-29 PROCEDURE — 80053 COMPREHEN METABOLIC PANEL: CPT

## 2022-04-29 PROCEDURE — 99232 SBSQ HOSP IP/OBS MODERATE 35: CPT | Performed by: PHYSICIAN ASSISTANT

## 2022-04-29 PROCEDURE — 70551 MRI BRAIN STEM W/O DYE: CPT

## 2022-04-29 PROCEDURE — 2100000000 HC CCU R&B

## 2022-04-29 PROCEDURE — 36415 COLL VENOUS BLD VENIPUNCTURE: CPT

## 2022-04-29 PROCEDURE — 99233 SBSQ HOSP IP/OBS HIGH 50: CPT | Performed by: PSYCHIATRY & NEUROLOGY

## 2022-04-29 PROCEDURE — 93306 TTE W/DOPPLER COMPLETE: CPT

## 2022-04-29 PROCEDURE — 82947 ASSAY GLUCOSE BLOOD QUANT: CPT

## 2022-04-29 PROCEDURE — 94003 VENT MGMT INPAT SUBQ DAY: CPT

## 2022-04-29 PROCEDURE — 2580000003 HC RX 258: Performed by: INTERNAL MEDICINE

## 2022-04-29 PROCEDURE — 2500000003 HC RX 250 WO HCPCS: Performed by: PSYCHIATRY & NEUROLOGY

## 2022-04-29 PROCEDURE — 95718 EEG PHYS/QHP 2-12 HR W/VEEG: CPT | Performed by: PSYCHIATRY & NEUROLOGY

## 2022-04-29 PROCEDURE — 85025 COMPLETE CBC W/AUTO DIFF WBC: CPT

## 2022-04-29 PROCEDURE — 6370000000 HC RX 637 (ALT 250 FOR IP): Performed by: INTERNAL MEDICINE

## 2022-04-29 PROCEDURE — 93880 EXTRACRANIAL BILAT STUDY: CPT

## 2022-04-29 PROCEDURE — 2700000000 HC OXYGEN THERAPY PER DAY

## 2022-04-29 RX ADMIN — DEXMEDETOMIDINE HYDROCHLORIDE 0.8 MCG/KG/HR: 4 INJECTION, SOLUTION INTRAVENOUS at 11:00

## 2022-04-29 RX ADMIN — VANCOMYCIN HYDROCHLORIDE 125 MG: 1 INJECTION, POWDER, LYOPHILIZED, FOR SOLUTION INTRAVENOUS at 06:09

## 2022-04-29 RX ADMIN — VANCOMYCIN HYDROCHLORIDE 125 MG: 1 INJECTION, POWDER, LYOPHILIZED, FOR SOLUTION INTRAVENOUS at 00:00

## 2022-04-29 RX ADMIN — LEVETIRACETAM 500 MG: 5 INJECTION INTRAVENOUS at 13:03

## 2022-04-29 RX ADMIN — LEVETIRACETAM 500 MG: 5 INJECTION INTRAVENOUS at 01:12

## 2022-04-29 RX ADMIN — HEPARIN SODIUM 5000 UNITS: 5000 INJECTION INTRAVENOUS; SUBCUTANEOUS at 09:41

## 2022-04-29 RX ADMIN — VANCOMYCIN HYDROCHLORIDE 125 MG: 1 INJECTION, POWDER, LYOPHILIZED, FOR SOLUTION INTRAVENOUS at 12:54

## 2022-04-29 RX ADMIN — Medication 10 ML: at 21:03

## 2022-04-29 RX ADMIN — Medication 10 ML: at 08:06

## 2022-04-29 RX ADMIN — CHLORHEXIDINE GLUCONATE 15 ML: 1.2 RINSE ORAL at 20:55

## 2022-04-29 RX ADMIN — DEXTROSE MONOHYDRATE 5 MG/HR: 5 INJECTION, SOLUTION INTRAVENOUS at 20:37

## 2022-04-29 RX ADMIN — ANORECTAL OINTMENT: 15.7; .44; 24; 20.6 OINTMENT TOPICAL at 06:08

## 2022-04-29 RX ADMIN — ANORECTAL OINTMENT: 15.7; .44; 24; 20.6 OINTMENT TOPICAL at 17:29

## 2022-04-29 RX ADMIN — INSULIN LISPRO 4 UNITS: 100 INJECTION, SOLUTION INTRAVENOUS; SUBCUTANEOUS at 17:30

## 2022-04-29 RX ADMIN — CHLORHEXIDINE GLUCONATE 15 ML: 1.2 RINSE ORAL at 08:05

## 2022-04-29 RX ADMIN — DEXTROSE MONOHYDRATE 14 MG/HR: 50 INJECTION, SOLUTION INTRAVENOUS at 00:51

## 2022-04-29 RX ADMIN — DEXMEDETOMIDINE HYDROCHLORIDE 0.8 MCG/KG/HR: 4 INJECTION, SOLUTION INTRAVENOUS at 03:12

## 2022-04-29 RX ADMIN — HEPARIN SODIUM 5000 UNITS: 5000 INJECTION INTRAVENOUS; SUBCUTANEOUS at 15:35

## 2022-04-29 RX ADMIN — HEPARIN SODIUM 5000 UNITS: 5000 INJECTION INTRAVENOUS; SUBCUTANEOUS at 21:02

## 2022-04-29 RX ADMIN — INSULIN LISPRO 2 UNITS: 100 INJECTION, SOLUTION INTRAVENOUS; SUBCUTANEOUS at 21:02

## 2022-04-29 RX ADMIN — INSULIN LISPRO 6 UNITS: 100 INJECTION, SOLUTION INTRAVENOUS; SUBCUTANEOUS at 08:14

## 2022-04-29 RX ADMIN — VANCOMYCIN HYDROCHLORIDE 125 MG: 1 INJECTION, POWDER, LYOPHILIZED, FOR SOLUTION INTRAVENOUS at 17:29

## 2022-04-29 RX ADMIN — INSULIN LISPRO 4 UNITS: 100 INJECTION, SOLUTION INTRAVENOUS; SUBCUTANEOUS at 12:54

## 2022-04-29 RX ADMIN — SODIUM CHLORIDE, PRESERVATIVE FREE 20 MG: 5 INJECTION INTRAVENOUS at 08:05

## 2022-04-29 RX ADMIN — DEXTROSE MONOHYDRATE 2 MG/HR: 50 INJECTION, SOLUTION INTRAVENOUS at 13:23

## 2022-04-29 RX ADMIN — DEXTROSE MONOHYDRATE 11.5 MG/HR: 50 INJECTION, SOLUTION INTRAVENOUS at 02:51

## 2022-04-29 ASSESSMENT — PULMONARY FUNCTION TESTS
PIF_VALUE: 22
PIF_VALUE: 20
PIF_VALUE: 32
PIF_VALUE: 20
PIF_VALUE: 25
PIF_VALUE: 19
PIF_VALUE: 21
PIF_VALUE: 28
PIF_VALUE: 21
PIF_VALUE: 32
PIF_VALUE: 28
PIF_VALUE: 33
PIF_VALUE: 20
PIF_VALUE: 21
PIF_VALUE: 27
PIF_VALUE: 23
PIF_VALUE: 30
PIF_VALUE: 23
PIF_VALUE: 21
PIF_VALUE: 29
PIF_VALUE: 10
PIF_VALUE: 22
PIF_VALUE: 25
PIF_VALUE: 21
PIF_VALUE: 22
PIF_VALUE: 27
PIF_VALUE: 23
PIF_VALUE: 23
PIF_VALUE: 21

## 2022-04-29 ASSESSMENT — PAIN SCALES - GENERAL
PAINLEVEL_OUTOF10: 1

## 2022-04-29 NOTE — CARE COORDINATION
Insurance denied LTAC services at this time stating pt is not ready for transfer and recommend weaning propofol gtt and placing trach/PEG. Insurance requires trach/PEG placement prior to admit to an [facility]. Placed denial letter in pt's soft chart and will follow with further referral or other dc disposition when POC determined.

## 2022-04-29 NOTE — PROGRESS NOTES
Nephrology ([address]) Progress Note    Patient:  Moni Cho  YOB: 1970  Date of Service: 4/29/2022  MRN: [id number]   Acct: [de-identified]   Primary Care Physician: ROMINA Ramires  Advance Directive: Full Code  Admit Date: 4/24/2022       Hospital Day: 5  Referring Provider: Greyson Salvador MD    Patient independently seen and examined, Chart, Consults, Notes, Operative notes, Labs, Cardiology, and Radiology studies reviewed as available. Chief complaint: Altered mental status/end-stage renal disease    Subjective:  Moni Cho is a 47 y.o. female for whom we were consulted for evaluation and treatment of end-stage renal disease. Patient also has history of seizure disorder, type 2 diabetes, hypertension, frequent hospitalization with noncompliance and poorly controlled diabetes. Patient was brought to the emergency room after she was found to have unresponsiveness and witnessed grand mal seizure. She was brought in by ambulance. On arrival in the emergency room she was found to be severely hyperglycemic and had a urinary tract infection. Patient was intubated, sedated and currently being treated by neurology for grand mal seizure. She is also receiving IV antibiotics and insulin drip. Her blood sugar control has significantly improved. On April 25, she has received emergent hemodialysis treatment for correction of volume status as well as hyponatremia. She is now moved from ICU to CCU. This morning patient is intubated/not sedated. Continuous EEG monitoring did not show any seizure activity. She is scheduled to have MRI of the brain today. She has poor neuro response and does not follow vocal command  .     Allergies:  Penicillins, Lamisil advanced [terbinafine], Reglan [metoclopramide], and Stadol [butorphanol]    Medicines:  Current Facility-Administered Medications   Medication Dose Route Frequency Provider Last Rate Last Admin    dexmedetomidine [place] Curb, DO   10 mL at 04/29/22 7488    sodium chloride flush 0.9 % injection 5-40 mL  5-40 mL IntraVENous PRN [place] C[place], DO        0.9 % sodium chloride infusion   IntraVENous PRN [place], DO        LORazepam (ATIVAN) injection 1 mg  1 mg IntraVENous Q5 Min PRN [place], DO        heparin (porcine) injection 5,000 Units  5,000 Units SubCUTAneous TID [place] C[place], DO   5,000 Units at 04/28/22 2010    ondansetron (ZOFRAN-ODT) disintegrating tablet 4 mg  4 mg Oral Q8H PRN [place] Curb, DO        Or    ondansetron [facility]) injection 4 mg  4 mg IntraVENous Q6H PRN [place] Curb, DO        polyethylene glycol [facility]) packet 17 g  17 g Oral Daily PRN [place] Curb, DO        acetaminophen (TYLENOL) tablet 650 mg  650 mg Oral Q6H PRN [place] C[place], DO   650 mg at 04/25/22 5420    Or    acetaminophen (TYLENOL) suppository 650 mg  650 mg Rectal Q6H PRN [place] Curb, DO        levETIRAcetam (KEPPRA) 500 mg/100 mL IVPB  500 mg IntraVENous Q12H [place] C[place], DO   Stopped at 04/29/22 0127    chlorhexidine (PERIDEX) 0.12 % solution 15 mL  15 mL Mouth/Throat BID [place] C[place], DO   15 mL at 04/29/22 0805    famotidine (PEPCID) 20 mg in sodium chloride (PF) 10 mL injection  20 mg IntraVENous Daily [place] C[place], DO   20 mg at 04/29/22 0805    propofol injection  5-50 mcg/kg/min IntraVENous Continuous [place] C[place], DO   Stopped at 04/28/22 5336    magic butt cream   Topical Q4H PRN [place] C[place], DO   Given at 04/25/22 0028    magnesium sulfate 1000 mg in dextrose 5% 100 mL IVPB  1,000 mg IntraVENous PRN Patience Walter, DO        potassium bicarb-citric acid (EFFER-K) effervescent tablet 40 mEq  40 mEq Oral PRN Patience Walter, DO        Or    potassium chloride 10 mEq/100 mL IVPB (Peripheral Line)  10 mEq IntraVENous PRN Marvelyn [place], DO   Stopped at 04/25/22 5521       Past Medical History:  Past Medical History:   Diagnosis Date    Arthritis     Chronic kidney disease, stage 5, kidney failure (HCC)     Closed fracture of right shoulder girdle, with routine healing, subsequent encounter     DM (diabetes mellitus) type II controlled with renal manifestation ([facility] [address] 06/1993    Gastroparesis     GERD (gastroesophageal reflux disease)     Hemodialysis patient ([facility] [address]     dialysis on tues, thur, and sat at [facility]    Hypertension     Hypothyroid     Nasal congestion     recent    Neuropathy     Noncompliance with medications     Palliative care patient 10/08/2019    Restless leg syndrome        Past Surgical History:  Past Surgical History:   Procedure Laterality Date    BREAST SURGERY Left 2008    infected milk gland removed    BREAST SURGERY Right 5/25/2021    WEDGE EXCISION RIGHT BREAST DUCT WITH LACRIMAL DUCT PROBE, PEC BLOCK performed by Yusuf Martin MD at [address] [facility]      2008    COLONOSCOPY Left 9/17/2020    Dr Gage Almodovar hepatic flexure-Severe tortuosity with severe spasming, 1 yr recall    DIALYSIS FISTULA CREATION Left 1/25/2019    REVISION LEFT UPPER EXTREMITY AV ACCESS WITH LEFT BRACHIAL ARTERY TO LEFT AXILLARY VEIN WITH  INTERPOSITIONAL ARTEGRAFT   performed by Johanna Schilling MD at [address]  2012    [address] Right 1/25/2019    INSERTION OF RIGHT INTERNAL JUGULAR HEMODIALYSIS CATHETER performed by Johanna Schilling MD at [address] [facility]  08/17/2018    1.  Moderately increased stainable iron identified by special stain in Kupffer cells and occasional hepatocytes. Negative for evidence of significant portal or lobular inflammation.  Negative for evidence of significant fibrosis    NV COLONOSCOPY W/BIOPSY SINGLE/MULTIPLE N/A 10/20/2017    Dr Juany Bruno prep-Tubular AP (-) dysplasia x 2--3 yr recall    NV EGD TRANSORAL BIOPSY SINGLE/MULTIPLE N/A 12/27/2016    Dr Sheridan Omer EGD TRANSORAL BIOPSY SINGLE/MULTIPLE N/A 10/20/2017    Dr Nicolle Blake (-)    TUBAL LIGATION          VASCULAR SURGERY Left 2016    fistula by Dr Tony Love at [address]  10/02/2017    [facility]. Left upper fistulograms/venograms, balloon angioplasty cephalic vein arch [id number] conquest.    VASCULAR SURGERY  2018    FISTULOGRAM    VASCULAR SURGERY  10/29/2018    [facility]. Left upper fistulograms/venograms    VASCULAR SURGERY  2018    [facility]. Arch aortogram,left upper arteriograms.  VASCULAR SURGERY  2019    [facility]. Removal of tunneled dialyis catheter right internal jugular vein.  VASCULAR SURGERY  2019    [facility]. Left upper extremity fistulogram including venography to the superior vena cava. Balloon angioplasty left subclavian vein with 10mm x 40mm conquest balloon. Balloon angioplasty mid/proximal upper arm cephalic vein with 97GD x 40mm conquest balloon. Venograms after balloon angioplasty. Stent left mid/proximal upper arm cephalic vein stenosis fluency 10mm x 60mm self-expanding covered stent. Balloon     VASCULAR SURGERY  2019    cont angioplasty stent with 10mm x 40mm conquest balloon. Completion venograms left upper extremity.        Family History  Family History   Problem Relation Age of Onset    Colon Cancer Mother          at 63    Colon Polyps Mother     Lung Cancer Father          at 79    Colon Polyps Father     Stomach Cancer Sister     Breast Cancer Sister 27    Depression Daughter 25        committed suicide    Liver Cancer Neg Hx     Liver Disease Neg Hx     Esophageal Cancer Neg Hx     Rectal Cancer Neg Hx        Social History  Social History     Socioeconomic History    Marital status: Single     Spouse name: Not on file    Number of children: 2    Years of education: Not on file    Highest education level: Not on file   Occupational History    Not on file   Tobacco Use    Smoking status: Current Every Day Smoker     Packs/day: 0.50     Years: 33.00 Pack years: 16.50     Types: Cigarettes    Smokeless tobacco: Never Used    Tobacco comment: NOW at 1/4 PD, started at age 24 and has averaged 2 PPD   Vaping Use    Vaping Use: Never used   Substance and Sexual Activity    Alcohol use: No    Drug use: Not Currently     Types: Marijuana Jany Kellogg)    Sexual activity: Not Currently     Partners: Male   Other Topics Concern    Not on file   Social History Narrative    Not on file     Social Determinants of Health     Financial Resource Strain:     Difficulty of Paying Living Expenses: Not on file   Food Insecurity:     Worried About Running Out of Food in the Last Year: Not on file    Kendy of Food in the Last Year: Not on file   Transportation Needs:     Lack of Transportation (Medical): Not on file    Lack of Transportation (Non-Medical):  Not on file   Physical Activity:     Days of Exercise per Week: Not on file    Minutes of Exercise per Session: Not on file   Stress:     Feeling of Stress : Not on file   Social Connections:     Frequency of Communication with Friends and Family: Not on file    Frequency of Social Gatherings with Friends and Family: Not on file    Attends Restorationism Services: Not on file    Active Member of [address] [place] [address] [employer] or Organizations: Not on file    Attends Club or Organization Meetings: Not on file    Marital Status: Not on file   Intimate Partner Violence:     Fear of Current or Ex-Partner: Not on file    Emotionally Abused: Not on file    Physically Abused: Not on file    Sexually Abused: Not on file   Housing Stability:     Unable to Pay for Housing in the Last Year: Not on file    Number of [place] in the Last Year: Not on file    Unstable Housing in the Last Year: Not on file         Review of Systems:  Review of system is unobtainable as she is intubated and sedated.}       Objective:  Patient Vitals for the past 24 hrs:   BP Temp Temp src Pulse Resp SpO2   04/29/22 0800 (!) 123/57   64 16 99 %   04/29/22 0700 (!) 135/54   61 16 98 %   04/29/22 0658    63 16 98 %   04/29/22 0602 (!) 121/57   61 16 98 %   04/29/22 0556 95/61        04/29/22 0502 (!) 115/52   59 16 97 %   04/29/22 0402 120/62 97 °F (36.1 °C) Temporal 65 16 97 %   04/29/22 0301 (!) 125/56   60 16 99 %   04/29/22 0202 (!) 126/56   71 17 100 %   04/29/22 0152    63 16 99 %   04/29/22 0102 139/64   67 16 99 %   04/29/22 0002 (!) 147/61 97.4 °F (36.3 °C) Temporal 73 16 99 %   04/28/22 2202 (!) 158/65   84 16 100 %   04/28/22 2137     16 99 %   04/28/22 2135    91 16 99 %   04/28/22 2102 (!) 140/61   94 14 96 %   04/28/22 2002 (!) 161/69 97.9 °F (36.6 °C) Temporal 93 19 96 %   04/28/22 1900 (!) 166/69   94  100 %   04/28/22 1800 (!) 160/69   92 17 99 %   04/28/22 1750    93 17 99 %   04/28/22 1700 (!) 169/74   97 16 99 %   04/28/22 1600 (!) 172/66 98.4 °F (36.9 °C) Temporal 93 16 100 %   04/28/22 1500 (!) 158/69   92 17 100 %   04/28/22 1438    91 20 99 %   04/28/22 1400 (!) 169/72   92 17 100 %   04/28/22 1300 120/81   94 20 99 %   04/28/22 1200 (!) 153/67   93 19 100 %   04/28/22 1100 133/72   96 20 99 %   04/28/22 1000 (!) 141/61   92 20 98 %   04/28/22 0958    91 19 99 %       Intake/Output Summary (Last 24 hours) at 4/29/2022 0915  Last data filed at 4/29/2022 0538  Gross per 24 hour   Intake 2811.71 ml   Output 4055 ml   Net -1243.29 ml     General: Intubated/sedated. HEENT: Normocephalic atraumatic head/orotracheal intubation  Neck: Supple without JVD or carotid bruits  Chest: Bilateral clear breath sounds. CVS: Regular rate and rhythm S1 and S2. Abdominal: Soft and nondistended positive bowel sounds. Extremities: No pedal edema.   Skin: warm and dry without rash    Labs:  BMP:   Recent Labs     04/27/22  0241 04/28/22  0203 04/29/22  0722   * 123* 128*   K 3.4* 3.4* 3.9   CL 88* 83* 90*   CO2 25 21* 17*   PHOS 3.8 4.9* 3.6   BUN 26* 32* 19   CREATININE 2.8* 3.5* 2.7*   CALCIUM 8.5* 8. 5* 8.2*     CBC:   Recent Labs     04/27/22  0241 04/28/22  0203 04/29/22  0846   WBC 6.2 6.9 6.5   HGB 9.7* 9.6* 9.6*   HCT 30.8* 30.2* 31.7*   MCV 91.4 91.8 94.6   * 131 110*     LIVER PROFILE:   Recent Labs     04/27/22 0241 04/28/22  0203 04/29/22  0722   AST 14 13 14   ALT 9 9 7   BILITOT 0.4 0.4 0.4   ALKPHOS 98 109* 96     PT/INR: No results for input(s): PROTIME, INR in the last 72 hours. APTT: No results for input(s): APTT in the last 72 hours. BNP:  No results for input(s): BNP in the last 72 hours. Ionized Calcium:No results for input(s): IONCA in the last 72 hours. Magnesium:  Recent Labs     04/27/22 0241 04/28/22  0203 04/29/22  0722   MG 1.9 2.1 2.3     Phosphorus:  Recent Labs     04/27/22  0241 04/28/22  0203 04/29/22  0722   PHOS 3.8 4.9* 3.6     HgbA1C:   No results for input(s): LABA1C in the last 72 hours. Hepatic:   Recent Labs     04/27/22  0241 04/28/22  0203 04/29/22  0722   ALKPHOS 98 109* 96   ALT 9 9 7   AST 14 13 14   PROT 5.8* 5.9* 5.6*   BILITOT 0.4 0.4 0.4   LABALBU 2.9* 3.3* 3.0*     Lactic Acid: No results for input(s): LACTA in the last 72 hours. Troponin: No results for input(s): CKTOTAL, CKMB, TROPONINT in the last 72 hours. ABGs:   Lab Results   Component Value Date    PHART 7.330 04/24/2022    PO2ART 389.0 04/24/2022    RBS2XTO 41.0 04/24/2022     CRP:  No results for input(s): CRP in the last 72 hours. Sed Rate:  No results for input(s): SEDRATE in the last 72 hours. Culture Results:   Blood Culture Recent:   Recent Labs     04/24/22  1447   BC No Growth to date. Any change in status will be called.        Urine Culture Recent :   Recent Labs     04/24/22  1354   LABURIN No growth at 36-48 hours       Radiology reports as per the Radiologist  Radiology: CT Head WO Contrast    Result Date: 4/24/2022  CT HEAD WO CONTRAST 4/24/2022 2:01 PM HISTORY: Altered mental status COMPARISON: CT scan dated 11/18/2021 DOSE LENGTH PRODUCT: 766 mGy cm TECHNIQUE: Helical tomographic images of the brain were obtained without the use of intravenous contrast. Automated exposure control was also utilized to decrease patient radiation dose. FINDINGS: There is no evidence of evolving large vascular territory infarct. No visualized intra-axial or extra-axial hemorrhage. No mass lesion is identified. Normal size and configuration of the ventricular system. The basal cisterns are symmetric. Posterior fossa structures are unremarkable. The included orbits and their contents are unremarkable. Chronic paranasal sinus disease on the left. The visualized osseous structures and overlying soft tissues of the skull and face are unremarkable. 1. No acute intracranial process. Signed by Dr Jose Francois    Result Date: 4/24/2022  XR CHEST PORTABLE 4/24/2022 4:27 PM HISTORY: ET tube, enteric tube  Technique: Single AP view of the chest COMPARISONS: 4/24/2022 FINDINGS: Endotracheal tube is identified with tip essentially touching the krysta. Enteric tube courses into the stomach with tip curled in the fundus back towards the gastroesophageal junction. The lungs are clear and well expanded. Heart size is stable. Pulmonary vasculature are nondilated. No pleural effusion or pneumothorax. 1. Endotracheal tube is deep, tip essentially touching the krysta. Lungs are clear and well expanded. I would recommend 2-3 cm withdrawal for a more optimal positioning. The results of this exam were discussed with [facility] (ICU nursing) on 4/24/2022 at 1640 hours Signed by Dr Jesus Restrepo    XR CHEST PORTABLE    Result Date: 4/24/2022  XR CHEST PORTABLE 4/24/2022 1:17 PM HISTORY: ET tube placement  Technique: Single AP view of the chest COMPARISONS: 4/24/2022 FINDINGS: Status post endotracheal intubation. ET tube is in good position. Lungs are well-expanded. Enteric tube curls on itself in the midesophagus and then extends back up into the throat. Heart size is stable.  Pulmonary vasculature are nondilated. 1. ET tube is in good position. Lungs are clear and well expanded. 2. Enteric tube curls on itself in the mid esophagus before extending back up into the throat. This will need repositioned. The results of this exam were discussed with Dr. Carrie Bloom on 4/24/2022 at 1329 hours Signed by Dr Meg Rodas    XR CHEST PORTABLE    Result Date: 4/24/2022  XR CHEST PORTABLE 4/24/2022 12:34 PM HISTORY: Altered mental status  Technique: Single AP view of the chest COMPARISONS: Chest exam dated 4/1/2022 FINDINGS: No lung consolidation. No pleural effusion or pneumothorax. Cardiomediastinal silhouette and pulmonary vasculature are unremarkable. No acute bony abnormality. Left subclavian region vascular stent with additional stent projecting over the left humerus. No acute cardiopulmonary process. Signed by Dr Loretta Craig    Result Date: 4/1/2022  XR CHEST PORTABLE 4/1/2022 12:35 PM HISTORY: Shortness of breath  Technique: Single AP view of the chest COMPARISONS: Chest exam dated 1/22/2022 FINDINGS: Cardiomegaly with central pulmonary vascular congestion, interstitial and alveolar edema as well as bilateral moderate layering pleural effusions. No pneumothorax. No acute bony abnormality. 1. Volume overload with interstitial and carie pulmonary edema as well as bilateral moderate pleural effusions. Signed by Dr Raúl Salazar   1. End-stage renal disease/dialysis dependent. 2.  Type II diabetic nephropathy. 3.  Acute respiratory failure/intubated. 4.  Status post grand mal seizure  5. Severe hyponatremia due to volume overload. 6.  Anemia of chronic kidney disease. 7.  Urinary tract infection. 8.  Pulmonary edema  9. Hypokalemia      Plan:  1. Routine dialysis tomorrow. MRI of the scan  2. Continue IV antibiotics. 3.  MRI of the brain today. 4.  Ventilator weaning?     Jose Guadalupe Reyna MD  04/29/22  9:15 AM

## 2022-04-29 NOTE — PROGRESS NOTES
[facility] Neurology Progress Note      Patient:   Carloz Kemp  MR#:    [id number]   Room:    0702/702-01   YOB: 1970  Date of Progress Note: 4/29/2022  Time of Note                           10:40 AM  Consulting Physician:  Prasad Poole DO  Attending Physician:  Rica Miller MD      INTERVAL HISTORY:  No acute events, remains intubated, no seizures overnight on cEEG. REVIEW OF SYSTEMS:  Unable to obtain     PHYSICAL EXAM:    Constitutional -   BP (!) 146/62   Pulse 64   Temp 97 °F (36.1 °C) (Temporal)   Resp 16   Ht 5' 6\" (1.676 m)   Wt 144 lb 11.2 oz (65.6 kg)   SpO2 99%   BMI 23.36 kg/m²   General appearance: Intubated, sedated   EYES -   Conjunctiva normal  Pupillary exam as below, see CN exam in the neurologic exam  ENT-    No scars, masses, or lesions over external nose or ears  Oropharynx - intubated  Cardiovascular -   No clubbing, cyanosis  Pulmonary-   Mechanically ventilated   Musculoskeletal -   No significant wasting of muscles noted  Gait as below, see gait exam in the neurologic exam  Muscle strength, tone, stability as below see the motor exam in the neurologic exam.   No bony deformities  Skin -   Warm, dry, and intact to inspection and palpation. No rash, erythema, or pallor        NEUROLOGICAL EXAM     Mental status    []? Awake, alert, oriented   []? Affect attention and concentration appear appropriate  []? Recent and remote memory appears unremarkable  []? Speech normal without dysarthria or aphasia, comprehension and repetition intact. COMMENTS:  Unresponsive to voice, not following commands, does appear to localize to pain intermittently   Cranial Nerves []? No VF deficit to confrontation,  optic discs normal, no papilledema on fundoscopic exam.  []? PERRLA, EOMI, no nystagmus, conjugate eye movements, no ptosis  []? Face symmetric  []? Facial sensation intact  []? Tongue midline no atrophy or fasciculations present  []?  Palate midline, hearing to finger rub normal  []? Shoulder shrug and SCM testing normal  COMMENTS:  PERRL, Face appears sym, OCR present   Motor   []? 5/5 strength x 4 extremities  [x]? Normal bulk and tone  [x]? No tremor present  []? No rigidity or bradykinesia noted  COMMENTS: Withdrawal x 4   Sensory  []? Sensation intact to light touch, pin prick, vibration, and proprioception BLE  []? Sensation intact to light touch, pin prick, vibration, and proprioception BUE  COMMENTS: Limited    Coordination []? FTN normal bilaterally   []? HTS normal bilaterally  []? SANFORD normal.   COMMENTS: Limited    Reflexes  [x]? Symmetric and non-pathological  [x]? Toes downgoing bilaterally  [x]? No clonus present  COMMENTS:   Gait                  []? Normal steady gait    []? Ataxic    []? Spastic     []? Magnetic     []? Shuffling  [x]? Not assessed  COMMENTS:        LABS/IMAGING:    CT Head WO Contrast    Result Date: 4/24/2022  CT HEAD WO CONTRAST 4/24/2022 2:01 PM HISTORY: Altered mental status COMPARISON: CT scan dated 11/18/2021 DOSE LENGTH PRODUCT: 766 mGy cm TECHNIQUE: Helical tomographic images of the brain were obtained without the use of intravenous contrast. Automated exposure control was also utilized to decrease patient radiation dose. FINDINGS: There is no evidence of evolving large vascular territory infarct. No visualized intra-axial or extra-axial hemorrhage. No mass lesion is identified. Normal size and configuration of the ventricular system. The basal cisterns are symmetric. Posterior fossa structures are unremarkable. The included orbits and their contents are unremarkable. Chronic paranasal sinus disease on the left. The visualized osseous structures and overlying soft tissues of the skull and face are unremarkable. 1. No acute intracranial process.  Signed by Dr Harmony Gardiner    XR CHEST PORTABLE    Result Date: 4/24/2022  XR CHEST PORTABLE 4/24/2022 4:27 PM HISTORY: ET tube, enteric tube  Technique: Single AP view of the chest COMPARISONS: 4/24/2022 FINDINGS: Endotracheal tube is identified with tip essentially touching the krysta. Enteric tube courses into the stomach with tip curled in the fundus back towards the gastroesophageal junction. The lungs are clear and well expanded. Heart size is stable. Pulmonary vasculature are nondilated. No pleural effusion or pneumothorax. 1. Endotracheal tube is deep, tip essentially touching the krysta. Lungs are clear and well expanded. I would recommend 2-3 cm withdrawal for a more optimal positioning. The results of this exam were discussed with Adiel Dawkins (ICU nursing) on 4/24/2022 at 1640 hours Signed by Dr April Clark    XR CHEST PORTABLE    Result Date: 4/24/2022  XR CHEST PORTABLE 4/24/2022 1:17 PM HISTORY: ET tube placement  Technique: Single AP view of the chest COMPARISONS: 4/24/2022 FINDINGS: Status post endotracheal intubation. ET tube is in good position. Lungs are well-expanded. Enteric tube curls on itself in the midesophagus and then extends back up into the throat. Heart size is stable. Pulmonary vasculature are nondilated. 1. ET tube is in good position. Lungs are clear and well expanded. 2. Enteric tube curls on itself in the mid esophagus before extending back up into the throat. This will need repositioned. The results of this exam were discussed with Dr. Marian Wilkins on 4/24/2022 at 1329 hours Signed by Dr April Clark    XR CHEST PORTABLE    Result Date: 4/24/2022  XR CHEST PORTABLE 4/24/2022 12:34 PM HISTORY: Altered mental status  Technique: Single AP view of the chest COMPARISONS: Chest exam dated 4/1/2022 FINDINGS: No lung consolidation. No pleural effusion or pneumothorax. Cardiomediastinal silhouette and pulmonary vasculature are unremarkable. No acute bony abnormality. Left subclavian region vascular stent with additional stent projecting over the left humerus. No acute cardiopulmonary process.  Signed by Dr April Clark      Lab Results   Component Value Date    WBC

## 2022-04-29 NOTE — PROGRESS NOTES
Comprehensive Nutrition Assessment    Type and Reason for Visit:  Reassess    Nutrition Recommendations/Plan:   1. Continue to work toward goal rate of TF     Malnutrition Assessment:  Malnutrition Status: At risk for malnutrition (Comment) (04/29/22 1017)    Context:  Acute Illness     Findings of the 6 clinical characteristics of malnutrition:  Energy Intake:  Mild decrease in energy intake (Comment)  Weight Loss:  No significant weight loss     Body Fat Loss:  No significant body fat loss     Muscle Mass Loss:  No significant muscle mass loss    Fluid Accumulation:  Mild Extremities   Strength:  Not Performed    Nutrition Assessment:    TF has been restarted and is currently at 35ml/hr. Day RN unsure why TF turned off with residual of 120ml. No Propofol and Proteinex has been stopped. No new Na+ level    Nutrition Related Findings:    on vent,  dialysis Wound Type: Deep Tissue Injury       Current Nutrition Intake & Therapies:    Average Meal Intake: NPO  Average Supplements Intake: NPO  Current Tube Feeding (TF) Orders:  · Feeding Route: Orogastric  · Formula: Peptide Based  · Schedule: Continuous  · Feeding Regimen: Vital AF 1.2 goal rate 54ml/hr  · Additives/Modulars: None  · Water Flushes: none at this time  · Current TF & Flush Orders Provides: Vital AF 1.2 @ 35ml/hr = 1008 kals with 63g protein, 92g CHO and 681ml free water  · Goal TF & Flush Orders Provides: Vital AF 1.2 @ 54ml/hr = 1555 kcals with 997g protein, 142g CHO and 1083 ml free water from formula      Anthropometric Measures:  Height: 5' 6\" (167.6 cm)  Ideal Body Weight (IBW): 130 lbs (59 kg)    Admission Body Weight: 165 lb (74.8 kg) (estimated)  Current Body Weight: 144 lb 11.2 oz (65.6 kg), 111.3 % IBW. Current BMI (kg/m2): 23.4  Usual Body Weight: 144 lb (65.3 kg) (1/2022)  % Weight Change (Calculated): 0.5  BMI Categories: Normal Weight (BMI 18.5-24. 9)    Estimated Daily Nutrient Needs:  Energy Requirements Based On: Kcal/kg  Weight Used for Energy Requirements: Current  Energy (kcal/day): 5997-6989 kcals (20-25 kcals/kg)  Weight Used for Protein Requirements: Current  Protein (g/day): 98g  Method Used for Fluid Requirements: 1 ml/kcal  Fluid (ml/day): 9098-8024 ml    Nutrition Diagnosis:   · Inadequate oral intake related to acute injury/trauma,impaired respiratory function as evidenced by NPO or clear liquid status due to medical condition,intubation,nutrition support - enteral nutrition      Nutrition Interventions:   Food and/or Nutrient Delivery: Continue Current Tube Feeding  Nutrition Education/Counseling: No recommendation at this time  Coordination of Nutrition Care: Continue to monitor while inpatient  Plan of Care discussed with: RN    Goals:  Previous Goal Met: Progressing toward Goal(s)  Goals: Meet at least 75% of estimated needs,Initiate nutrition support       Nutrition Monitoring and Evaluation:   Behavioral-Environmental Outcomes: None Identified  Food/Nutrient Intake Outcomes: Enteral Nutrition Intake/Tolerance  Physical Signs/Symptoms Outcomes: Biochemical Data,Fluid Status or Edema,Weight,Skin,Nutrition Focused Physical Findings    Discharge Planning:     Too soon to determine     Efren Vasques MS, RD, LD  Contact: [phone]

## 2022-04-29 NOTE — PROGRESS NOTES
SBT was performed. Pt passed the breathing trial but did not wake up. She has had large amounts of mucous and secretions throughout the day and was not alert enough to be able to manage those secretions at this time. SBP came up to 220 during breathing trial. Previous vent settings continued at this time.

## 2022-04-29 NOTE — PROGRESS NOTES
Hospitalist Progress Note  [facility]     Patient: Rosalia Feng  : 1970  MRN: [id number]  Code Status: Full Code    Hospital Day: 5   Date of Service: 2022    Subjective:   Patient seen and examined. Intubated and sedated. Past Medical History:   Diagnosis Date    Arthritis     Chronic kidney disease, stage 5, kidney failure (HCC)     Closed fracture of right shoulder girdle, with routine healing, subsequent encounter     DM (diabetes mellitus) type II controlled with renal manifestation ([facility] [address] 1993    Gastroparesis     GERD (gastroesophageal reflux disease)     Hemodialysis patient ([facility] [address]     dialysis on tues, thur, and sat at [facility]    Hypertension     Hypothyroid     Nasal congestion     recent    Neuropathy     Noncompliance with medications     Palliative care patient 10/08/2019    Restless leg syndrome        Past Surgical History:   Procedure Laterality Date    BREAST SURGERY Left     infected milk gland removed    BREAST SURGERY Right 2021    WEDGE EXCISION RIGHT BREAST DUCT WITH LACRIMAL DUCT PROBE, PEC BLOCK performed by Kimberly Rodas MD at [address] [facility]          COLONOSCOPY Left 2020    Dr Colton Rendon hepatic flexure-Severe tortuosity with severe spasming, 1 yr recall    DIALYSIS FISTULA CREATION Left 2019    REVISION LEFT UPPER EXTREMITY AV ACCESS WITH LEFT BRACHIAL ARTERY TO LEFT AXILLARY VEIN WITH  INTERPOSITIONAL ARTEGRAFT   performed by Reed Wilson MD at [facility]  2012    [address] Right 2019    INSERTION OF RIGHT INTERNAL JUGULAR HEMODIALYSIS CATHETER performed by Reed Wilson MD at [address] [facility]  2018    1.  Moderately increased stainable iron identified by special stain in Kupffer cells and occasional hepatocytes. Negative for evidence of significant portal or lobular inflammation.  Negative for evidence of significant fibrosis    CA COLONOSCOPY W/BIOPSY SINGLE/MULTIPLE N/A 10/20/2017    Dr Ilya Drake prep-Tubular AP (-) dysplasia x 2--3 yr recall    CA EGD TRANSORAL BIOPSY SINGLE/MULTIPLE N/A 2016    Dr Dick Martinez EGD TRANSORAL BIOPSY SINGLE/MULTIPLE N/A 10/20/2017    Dr Armando Wong (-)    TUBAL LIGATION          VASCULAR SURGERY Left 2016    fistula by Dr Franklin Muñoz at [address]  10/02/2017    [facility]. Left upper fistulograms/venograms, balloon angioplasty cephalic vein arch [id number] conquest.    VASCULAR SURGERY  2018    FISTULOGRAM    VASCULAR SURGERY  10/29/2018    [facility]. Left upper fistulograms/venograms    VASCULAR SURGERY  2018    [facility]. Arch aortogram,left upper arteriograms.  VASCULAR SURGERY  2019    [facility]. Removal of tunneled dialyis catheter right internal jugular vein.  VASCULAR SURGERY  2019    [facility]. Left upper extremity fistulogram including venography to the superior vena cava. Balloon angioplasty left subclavian vein with 10mm x 40mm conquest balloon. Balloon angioplasty mid/proximal upper arm cephalic vein with 30IA x 40mm conquest balloon. Venograms after balloon angioplasty. Stent left mid/proximal upper arm cephalic vein stenosis fluency 10mm x 60mm self-expanding covered stent. Balloon     VASCULAR SURGERY  2019    cont angioplasty stent with 10mm x 40mm conquest balloon. Completion venograms left upper extremity.        Family History   Problem Relation Age of Onset    Colon Cancer Mother          at 63    Colon Polyps Mother     Lung Cancer Father          at 79    Colon Polyps Father     Stomach Cancer Sister     Breast Cancer Sister 27    Depression Daughter 25        committed suicide    Liver Cancer Neg Hx     Liver Disease Neg Hx     Esophageal Cancer Neg Hx     Rectal Cancer Neg Hx        Social History     Socioeconomic History    Marital status: Single     Spouse name: Not on file    Number of children: 2    Years of education: Medication Dose Route Frequency Provider Last Rate Last Admin    dexmedetomidine (PRECEDEX) 400 mcg in sodium chloride 0.9 % 100 mL infusion  0.1-1.5 mcg/kg/hr IntraVENous Continuous Raydell Fought, DO 9.8 mL/hr at 04/29/22 1304 0.6 mcg/kg/hr at 04/29/22 1304    menthol-zinc oxide (CALMOSEPTINE) 0.44-20.6 % ointment   Topical BID Edilma Smith MD   Given at 04/29/22 4916    niCARdipine (CARDENE) 25 mg in dextrose 5 % 250 mL infusion  2.5-15 mg/hr IntraVENous Continuous Raydell Fought, DO 20 mL/hr at 04/29/22 1323 2 mg/hr at 04/29/22 1323    sodium phosphate 10.5 mmol in sodium chloride 0.9 % 250 mL IVPB  0.16 mmol/kg IntraVENous PRN Patience Walter, DO        Or    sodium phosphate 21 mmol in sodium chloride 0.9 % 250 mL IVPB  0.32 mmol/kg IntraVENous PRN Patience Walter, DO        insulin lispro (HUMALOG) injection vial 0-12 Units  0-12 Units SubCUTAneous TID WC Raydell Fought, DO   4 Units at 04/29/22 1254    insulin lispro (HUMALOG) injection vial 0-6 Units  0-6 Units SubCUTAneous Nightly Raydell Fought, DO   1 Units at 04/28/22 2011    vancomycin (VANCOCIN) oral solution 125 mg  125 mg Oral 4 times per day Raydell Fought, DO   125 mg at 04/29/22 1254    hydrALAZINE (APRESOLINE) tablet 100 mg  100 mg Oral 3 times per day Patience Walter, DO   100 mg at 04/28/22 2014    propofol injection  5-50 mcg/kg/min IntraVENous Once Raydell Fought, DO 11.2 mL/hr at 04/24/22 1505 25 mcg/kg/min at 04/24/22 1505    glucagon (rDNA) injection 1 mg  1 mg IntraMUSCular PRN Raydell Fought, DO        dextrose 5 % solution  100 mL/hr IntraVENous PRN Raydell Fought, DO        glucose chewable tablet 16 g  4 tablet Oral PRN Raydell Fought, DO        dextrose bolus (hypoglycemia) 10% 125 mL  125 mL IntraVENous PRN Raydell Fought, DO   Stopped at 04/25/22 1993    Or    dextrose bolus (hypoglycemia) 10% 250 mL  250 mL IntraVENous DESMOND Medina DO        sodium chloride flush 0.9 % injection 5-40 mL  5-40 mL IntraVENous 2 times per day [facility], DO   10 mL at 04/29/22 3283    sodium chloride flush 0.9 % injection 5-40 mL  5-40 mL IntraVENous PRN [facility], DO        0.9 % sodium chloride infusion   IntraVENous PRN [facility], DO        LORazepam (ATIVAN) injection 1 mg  1 mg IntraVENous Q5 Min PRN [facility], DO        heparin (porcine) injection 5,000 Units  5,000 Units SubCUTAneous TID [facility], [facility]   5,000 Units at 04/29/22 7859    ondansetron (ZOFRAN-ODT) disintegrating tablet 4 mg  4 mg Oral Q8H PRN [facility], DO        Or    ondansetron [facility]) injection 4 mg  4 mg IntraVENous Q6H PRN [facility], DO        polyethylene glycol [facility]) packet 17 g  17 g Oral Daily PRN [facility], DO        acetaminophen (TYLENOL) tablet 650 mg  650 mg Oral Q6H PRN [facility], DO   650 mg at 04/25/22 9594    Or    acetaminophen (TYLENOL) suppository 650 mg  650 mg Rectal Q6H PRN [facility], DO        levETIRAcetam (KEPPRA) 500 mg/100 mL IVPB  500 mg IntraVENous Q12H [facility], DO   Stopped at 04/29/22 1318    chlorhexidine (PERIDEX) 0.12 % solution 15 mL  15 mL Mouth/Throat BID [facility], DO   15 mL at 04/29/22 0805    famotidine (PEPCID) 20 mg in sodium chloride (PF) 10 mL injection  20 mg IntraVENous Daily [facility], DO   20 mg at 04/29/22 0805    propofol injection  5-50 mcg/kg/min IntraVENous Continuous [facility], DO   Stopped at 04/28/22 9898    magic butt cream   Topical Q4H PRN [facility], DO   Given at 04/25/22 0028    magnesium sulfate 1000 mg in dextrose 5% 100 mL IVPB  1,000 mg IntraVENous PRN Patience Walter, DO        potassium bicarb-citric acid (EFFER-K) effervescent tablet 40 mEq  40 mEq Oral PRN Patience Walter, DO        Or    potassium chloride 10 mEq/100 mL IVPB (Peripheral Line)  10 mEq IntraVENous PRN Anabel Bailey, DO   Stopped at 04/25/22 0936         dexmedetomidine HCl in NaCl 0.6 mcg/kg/hr (04/29/22 1304)    niCARdipine 2 mg/hr (04/29/22 1323)    dextrose      sodium chloride      propofol Stopped (04/28/22 0323)        Objective:   BP (!) 186/66   Pulse 66   Temp 97 °F (36.1 °C) (Temporal)   Resp 16   Ht 5' 6\" (1.676 m)   Wt 144 lb 11.2 oz (65.6 kg)   SpO2 100%   BMI 23.36 kg/m²     General: no acute distress  HEENT: normocephalic, atraumatic  Neck: supple, symmetrical, trachea midline   Lungs: clear to auscultation bilaterally   Cardiovascular: s1 and s2 normal  Abdomen: soft, positive bowel sounds  Extremities: no cyanosis   Neuro: intubated and sedated    Recent Labs     04/27/22 0241 04/28/22  0203 04/29/22  0846   WBC 6.2 6.9 6.5   RBC 3.37* 3.29* 3.35*   HGB 9.7* 9.6* 9.6*   HCT 30.8* 30.2* 31.7*   MCV 91.4 91.8 94.6   MCH 28.8 29.2 28.7   MCHC 31.5* 31.8* 30.3*   * 131 110*     Recent Labs     04/27/22 0241 04/28/22 0203 04/29/22  0722   * 123* 128*   K 3.4* 3.4* 3.9   ANIONGAP 13 19 21*   CL 88* 83* 90*   CO2 25 21* 17*   BUN 26* 32* 19   CREATININE 2.8* 3.5* 2.7*   GLUCOSE 198* 235* 305*   CALCIUM 8.5* 8.5* 8.2*     Recent Labs     04/27/22 0241 04/28/22  0203 04/29/22  0722   MG 1.9 2.1 2.3   PHOS 3.8 4.9* 3.6     Recent Labs     04/27/22 0241 04/28/22  0203 04/29/22  0722   AST 14 13 14   ALT 9 9 7   BILITOT 0.4 0.4 0.4   ALKPHOS 98 109* 96     No results for input(s): PH, PO2, PCO2, HCO3, BE, O2SAT in the last 72 hours. No results for input(s): TROPONINI in the last 72 hours. No results for input(s): INR in the last 72 hours. No results for input(s): LACTA in the last 72 hours.       Intake/Output Summary (Last 24 hours) at 4/29/2022 1443  Last data filed at 4/29/2022 0538  Gross per 24 hour   Intake 2224.42 ml   Output 30 ml   Net 2194.42 ml     Assessment and Plan:   Acute left MCA infarct  Per MRI brain 4/29/2022  Neurology following  Neurochecks  Meds per

## 2022-04-29 NOTE — PROGRESS NOTES
Palliative Care Progress Note  4/29/2022 11:45 AM    Patient:  Danitza Helton  YOB: 1970  Primary Care Physician: ROMINA Strong  Advance Directive: Full Code  Admit Date: 4/24/2022       Hospital Day: 5  Portions of this note have been copied forward, however, changed to reflect the most current clinical status of this patient. CHIEF COMPLAINT/REASON FOR CONSULTATION Goals of care    SUBJECTIVE:  Ms. Florian Martin remains intubated but is requiring minimal ventilator settings. Review of Systems:   14 point review of systems is negative except as specifically addressed above. Objective:   VITALS:  BP (!) 146/62   Pulse 64   Temp 97 °F (36.1 °C) (Temporal)   Resp 16   Ht 5' 6\" (1.676 m)   Wt 144 lb 11.2 oz (65.6 kg)   SpO2 99%   BMI 23.36 kg/m²   24HR INTAKE/OUTPUT:      Intake/Output Summary (Last 24 hours) at 4/29/2022 1145  Last data filed at 4/29/2022 8485  Gross per 24 hour   Intake 2811.71 ml   Output 4055 ml   Net -1243.29 ml       General appearance: 46 yo female, appears older than stated age, no acute distress   Head: Normocephalic, without obvious abnormality, atraumatic  Eyes: conjunctivae/corneas clear.  PERRL  Ears: normal external ears and nose  Neck: supple, symmetrical, trachea midline   Lungs: diminished air entry to bilateral bases, respirations even/unlabored   Heart: regular rate and rhythm, S1, S2 normal, no murmur  Abdomen:soft, no grimacing w/ palpation; non-distended, bowel sounds present    Extremities:No lower extremity edema,  No erythema, no tenderness to palpation  Skin: warm, dry  Neurologic: Intubated, localizes to pain    Medications:      dexmedetomidine HCl in NaCl 0.8 mcg/kg/hr (04/29/22 1100)    niCARdipine 1.5 mg/hr (04/29/22 0538)    dextrose      sodium chloride      propofol Stopped (04/28/22 4678)      menthol-zinc oxide   Topical BID    insulin lispro  0-12 Units SubCUTAneous TID WC    insulin lispro  0-6 Units SubCUTAneous Nightly    vancomycin  125 mg Oral 4 times per day    hydrALAZINE  100 mg Oral 3 times per day    propofol  5-50 mcg/kg/min IntraVENous Once    sodium chloride flush  5-40 mL IntraVENous 2 times per day    heparin (porcine)  5,000 Units SubCUTAneous TID    levetiracetam  500 mg IntraVENous Q12H    chlorhexidine  15 mL Mouth/Throat BID    famotidine (PEPCID) injection  20 mg IntraVENous Daily     sodium phosphate IVPB **OR** sodium phosphate IVPB, glucagon (rDNA), dextrose, glucose, dextrose bolus (hypoglycemia) **OR** dextrose bolus (hypoglycemia), sodium chloride flush, sodium chloride, LORazepam, ondansetron **OR** ondansetron, polyethylene glycol, acetaminophen **OR** acetaminophen, magic butt cream, magnesium sulfate, potassium alternative oral replacement **OR** potassium chloride  Diet NPO  ADULT TUBE FEEDING; Orogastric; Peptide Based; Continuous; 30; Yes; 5; Q 6 hours; 54; 0; Other (specify); no free water flush at this time     Lab and other Data:     Recent Labs     04/27/22 0241 04/28/22 0203 04/29/22  0846   WBC 6.2 6.9 6.5   HGB 9.7* 9.6* 9.6*   * 131 110*     Recent Labs     04/27/22 0241 04/28/22  0203 04/29/22  0722   * 123* 128*   K 3.4* 3.4* 3.9   CL 88* 83* 90*   CO2 25 21* 17*   BUN 26* 32* 19   CREATININE 2.8* 3.5* 2.7*   GLUCOSE 198* 235* 305*     Recent Labs     04/27/22 0241 04/28/22  0203 04/29/22  0722   AST 14 13 14   ALT 9 9 7   BILITOT 0.4 0.4 0.4   ALKPHOS 98 109* 96       Assessment/Plan   Principal Problem:    Seizure ([facility] [address]  Active Problems:    ESRD (end stage renal disease) on dialysis ([facility] [address]    Hyponatremia    Uncontrolled hypertension    Palliative care patient    Uncontrolled type 2 diabetes mellitus with hyperosmolar nonketotic hyperglycemia (HCC)    Acute hypoxemic respiratory failure (HCC)    UTI (urinary tract infection)  Resolved Problems:    * No resolved hospital problems.  *      Visit Summary:  Chart reviewed, patient discussed with consulting service and nursing staff. Reviewed health issues, work up and treatment plan as well as factors that lead to hospitalization. Ms. Mariel Garibay was seen at her bedside with RN present. Plans for MRI today and then hopeful attempt at extubation. Currently, plans are for discharge to [facility]. No seizure activity noted overnight. Continuous EEG discontinued. Recommendations:   1. Palliative care: GOC continue aggressive course. Plan for LTACH. Code status: Full code    2. Acute hypoxemic respiratory failure-minimal vent requirements. Hopeful for attempt at extubation today if SBTs ok    3. Hyperglycemia w/ Hx of DM II-mgmt per Hospitalist, SSI    4. S/p tonic-clonic seizure-mgmt per Neurology, cEEG discontinued, Keppra 500 mg BID    5. Concern for UTI-Urine cx no growth     6. ESRD on HD-Nephrology managing    7. Hypertension-Hydralazine 100 mg TID, cardene gtt as needed    8. Hyponatremia-improving    9. Clostridium difficile/Norovirus-Oral vancomycin     Thank you for consulting Palliative Care and allowing us to participate in the care of this patient.    Time Spent Counseling > 50%:  YES                                   Total Time Spent with patient/family counseling, workup/treatment review, counseling and placement of orders/preparation of this note: 30 minutes    Electronically signed by Calos Aguilar PA-C on 4/29/2022 at 11:45 AM    (Please note that portions of this note were completed with a voice recognition program.  Edenilson Clement made to edit the dictations but occasionally words are mis-transcribed.)

## 2022-04-29 NOTE — PROGRESS NOTES
Radiologist called unit and informed staff of MRI results. Hospitalist and neuro was contacted and given results of MRI. Dr. Arteaga Degree stated to keep pt's SBP in the 150-200 range.

## 2022-04-29 NOTE — PROCEDURES
DAILY ADULT INPATIENT VIDEO-EEG EVENT MONITORING REPORT    Patient:   Reed Cueto  MR#:    [id number]  Room #:    INPATIENT   YOB: 1970  Study Date:    April 28, 2022  Primary Physician:     ROMINA Pantoja   Referring Physician:   Kandy Mcburney, DO      CLINICAL INFORMATION:     This patient is a 47 y.o. female with a history of seizures. The specific reason we are doing this study is for event clarification and to evaluate for an underlying seizure focus and to exclude subclinical status epilepticus. MEDICATIONS:     See MAR    RECORDING CONDITIONS:     Continuous video-EEG monitoring was performed with [employer] equiptment. The recorded period occurred on April 28, 2022. Electrodes were applied according to the International 10-20 System. Data were obtained, stored, and interpreted according to ACNS guidelines (J Clin Neurophysiol 2006;23(2):) utilizing referential montage recording, with reformatting to longitudinal, transverse bipolar, and referential montages as necessary for interpretation, along with digital/automated EEG analysis. Physician access and review of the EEG and Video data occurred actively during the recording. RECORDING DURATION:   24 hours. DESCRIPTION OF BASELINE RECORD:    The background is somewhat asymmetric with low voltage slowing seen over the left hemisphere. Intermixed burst suppression is noted throughout the recording which is likely due to underlying propofol use. No overt electrographic seizure activity was noted. No overt epileptiform abnormalities were seen. Muscle, motion, and eye movement artifacts were occasionally noted. DESCRIPTION OF EVENTS:    April 28, 2022  The Interictal files were as described in the baseline recording. Digital spike and event analysis reviewed and showed diffuse lower voltage slowing at times appearing more prominent over the left hemisphere. No overt electrographic seizure activity noted.        E.E.G. INTERPRETATION:    Abnormal EEG due to diffuse lower voltage slowing at times appearing more prominent over the left hemisphere and appeared to improve during the latter portion of the study. No overt electrographic seizure activity was seen. CLINICAL CORRELATION:    Continue cEEG monitoring.          Enriqueta Ocasio DO  Board Certified Neurologist

## 2022-04-29 NOTE — PROCEDURES
DAILY ADULT INPATIENT VIDEO-EEG EVENT MONITORING REPORT    Patient:   Frances Vera  MR#:    [id number]  Room #:    INPATIENT   YOB: 1970  Study Date:    April 29, 2022  Primary Physician:     ROMINA Barraza   Referring Physician:   Lonnie Vicente DO      CLINICAL INFORMATION:     This patient is a 47 y.o. female with a history of seizures. The specific reason we are doing this study is for event clarification and to evaluate for an underlying seizure focus and to exclude subclinical status epilepticus. MEDICATIONS:     See MAR    RECORDING CONDITIONS:     Continuous video-EEG monitoring was performed with [employer] equiptment. The recorded period occurred on April 29, 2022. Electrodes were applied according to the International 10-20 System. Data were obtained, stored, and interpreted according to ACNS guidelines (J Clin Neurophysiol 2006;23(2):) utilizing referential montage recording, with reformatting to longitudinal, transverse bipolar, and referential montages as necessary for interpretation, along with digital/automated EEG analysis. Physician access and review of the EEG and Video data occurred actively during the recording. RECORDING DURATION:   10 hours, 45 min. DESCRIPTION OF BASELINE RECORD:    The background is somewhat asymmetric with low voltage slowing seen over the left hemisphere. Intermixed burst suppression is noted throughout the recording which is likely due to underlying propofol use. No overt electrographic seizure activity was noted. No overt epileptiform abnormalities were seen. Muscle, motion, and eye movement artifacts were occasionally noted. DESCRIPTION OF EVENTS:    April 28, 2022  The Interictal files were as described in the baseline recording. Digital spike and event analysis reviewed and showed diffuse lower voltage slowing at times appearing more prominent over the left hemisphere. No overt electrographic seizure activity noted. E.E. G. INTERPRETATION:    Abnormal EEG due to diffuse lower voltage slowing at times appearing more prominent over the left hemisphere and appeared to improve during the latter portion of the study. No overt electrographic seizure activity was seen. CLINICAL CORRELATION:    Discontinue cEEG monitoring.          Chayito Rueda DO  Board Certified Neurologist

## 2022-04-30 PROBLEM — R53.1 WEAKNESS: Status: ACTIVE | Noted: 2022-04-30

## 2022-04-30 PROBLEM — I63.9 ACUTE ISCHEMIC STROKE (HCC): Status: ACTIVE | Noted: 2022-04-30

## 2022-04-30 LAB
ALBUMIN SERPL-MCNC: 3.2 G/DL (ref 3.5–5.2)
ALP BLD-CCNC: 106 U/L (ref 35–104)
ALT SERPL-CCNC: 7 U/L (ref 5–33)
ANION GAP SERPL CALCULATED.3IONS-SCNC: 21 MMOL/L (ref 7–19)
AST SERPL-CCNC: 13 U/L (ref 5–32)
BASOPHILS ABSOLUTE: 0.1 K/UL (ref 0–0.2)
BASOPHILS RELATIVE PERCENT: 0.8 % (ref 0–1)
BILIRUB SERPL-MCNC: 0.4 MG/DL (ref 0.2–1.2)
BUN BLDV-MCNC: 26 MG/DL (ref 6–20)
CALCIUM SERPL-MCNC: 8.9 MG/DL (ref 8.6–10)
CHLORIDE BLD-SCNC: 88 MMOL/L (ref 98–111)
CO2: 18 MMOL/L (ref 22–29)
CREAT SERPL-MCNC: 3.3 MG/DL (ref 0.5–0.9)
EOSINOPHILS ABSOLUTE: 0.2 K/UL (ref 0–0.6)
EOSINOPHILS RELATIVE PERCENT: 2.1 % (ref 0–5)
GFR AFRICAN AMERICAN: 18
GFR NON-AFRICAN AMERICAN: 15
GLUCOSE BLD-MCNC: 194 MG/DL (ref 70–99)
GLUCOSE BLD-MCNC: 205 MG/DL (ref 70–99)
GLUCOSE BLD-MCNC: 264 MG/DL (ref 74–109)
GLUCOSE BLD-MCNC: 338 MG/DL (ref 70–99)
GLUCOSE BLD-MCNC: 408 MG/DL (ref 70–99)
HCT VFR BLD CALC: 31.5 % (ref 37–47)
HEMOGLOBIN: 9.7 G/DL (ref 12–16)
IMMATURE GRANULOCYTES #: 0 K/UL
LYMPHOCYTES ABSOLUTE: 0.7 K/UL (ref 1.1–4.5)
LYMPHOCYTES RELATIVE PERCENT: 8.9 % (ref 20–40)
MAGNESIUM: 2.4 MG/DL (ref 1.6–2.6)
MCH RBC QN AUTO: 28.5 PG (ref 27–31)
MCHC RBC AUTO-ENTMCNC: 30.8 G/DL (ref 33–37)
MCV RBC AUTO: 92.6 FL (ref 81–99)
MONOCYTES ABSOLUTE: 0.5 K/UL (ref 0–0.9)
MONOCYTES RELATIVE PERCENT: 6.7 % (ref 0–10)
NEUTROPHILS ABSOLUTE: 6.3 K/UL (ref 1.5–7.5)
NEUTROPHILS RELATIVE PERCENT: 81.2 % (ref 50–65)
PDW BLD-RTO: 18.5 % (ref 11.5–14.5)
PERFORMED ON: ABNORMAL
PHOSPHORUS: 4.1 MG/DL (ref 2.5–4.5)
PLATELET # BLD: 132 K/UL (ref 130–400)
PMV BLD AUTO: 9.9 FL (ref 9.4–12.3)
POTASSIUM SERPL-SCNC: 3.8 MMOL/L (ref 3.5–5)
RBC # BLD: 3.4 M/UL (ref 4.2–5.4)
SODIUM BLD-SCNC: 127 MMOL/L (ref 136–145)
TOTAL PROTEIN: 5.9 G/DL (ref 6.6–8.7)
WBC # BLD: 7.8 K/UL (ref 4.8–10.8)

## 2022-04-30 PROCEDURE — 2100000000 HC CCU R&B

## 2022-04-30 PROCEDURE — 2500000003 HC RX 250 WO HCPCS: Performed by: INTERNAL MEDICINE

## 2022-04-30 PROCEDURE — 2580000003 HC RX 258: Performed by: INTERNAL MEDICINE

## 2022-04-30 PROCEDURE — 8010000000 HC HEMODIALYSIS ACUTE INPT

## 2022-04-30 PROCEDURE — 6360000002 HC RX W HCPCS: Performed by: INTERNAL MEDICINE

## 2022-04-30 PROCEDURE — 36415 COLL VENOUS BLD VENIPUNCTURE: CPT

## 2022-04-30 PROCEDURE — 2700000000 HC OXYGEN THERAPY PER DAY

## 2022-04-30 PROCEDURE — 85025 COMPLETE CBC W/AUTO DIFF WBC: CPT

## 2022-04-30 PROCEDURE — 6370000000 HC RX 637 (ALT 250 FOR IP): Performed by: INTERNAL MEDICINE

## 2022-04-30 PROCEDURE — 99233 SBSQ HOSP IP/OBS HIGH 50: CPT | Performed by: PSYCHIATRY & NEUROLOGY

## 2022-04-30 PROCEDURE — 6370000000 HC RX 637 (ALT 250 FOR IP): Performed by: PSYCHIATRY & NEUROLOGY

## 2022-04-30 PROCEDURE — 80053 COMPREHEN METABOLIC PANEL: CPT

## 2022-04-30 PROCEDURE — 2580000003 HC RX 258: Performed by: PSYCHIATRY & NEUROLOGY

## 2022-04-30 PROCEDURE — 84100 ASSAY OF PHOSPHORUS: CPT

## 2022-04-30 PROCEDURE — 83735 ASSAY OF MAGNESIUM: CPT

## 2022-04-30 PROCEDURE — 94003 VENT MGMT INPAT SUBQ DAY: CPT

## 2022-04-30 PROCEDURE — 2500000003 HC RX 250 WO HCPCS: Performed by: PSYCHIATRY & NEUROLOGY

## 2022-04-30 PROCEDURE — 82947 ASSAY GLUCOSE BLOOD QUANT: CPT

## 2022-04-30 RX ORDER — INSULIN GLARGINE 100 [IU]/ML
5 INJECTION, SOLUTION SUBCUTANEOUS NIGHTLY
Status: DISCONTINUED | OUTPATIENT
Start: 2022-04-30 | End: 2022-05-03

## 2022-04-30 RX ORDER — ASPIRIN 300 MG/1
300 SUPPOSITORY RECTAL DAILY
Status: DISCONTINUED | OUTPATIENT
Start: 2022-04-30 | End: 2022-04-30

## 2022-04-30 RX ADMIN — ANORECTAL OINTMENT: 15.7; .44; 24; 20.6 OINTMENT TOPICAL at 17:53

## 2022-04-30 RX ADMIN — Medication 10 ML: at 20:00

## 2022-04-30 RX ADMIN — HEPARIN SODIUM 5000 UNITS: 5000 INJECTION INTRAVENOUS; SUBCUTANEOUS at 09:08

## 2022-04-30 RX ADMIN — VANCOMYCIN HYDROCHLORIDE 125 MG: 1 INJECTION, POWDER, LYOPHILIZED, FOR SOLUTION INTRAVENOUS at 17:43

## 2022-04-30 RX ADMIN — INSULIN GLARGINE 5 UNITS: 100 INJECTION, SOLUTION SUBCUTANEOUS at 17:44

## 2022-04-30 RX ADMIN — VANCOMYCIN HYDROCHLORIDE 125 MG: 1 INJECTION, POWDER, LYOPHILIZED, FOR SOLUTION INTRAVENOUS at 00:25

## 2022-04-30 RX ADMIN — CHLORHEXIDINE GLUCONATE 15 ML: 1.2 RINSE ORAL at 09:13

## 2022-04-30 RX ADMIN — HEPARIN SODIUM 5000 UNITS: 5000 INJECTION INTRAVENOUS; SUBCUTANEOUS at 15:58

## 2022-04-30 RX ADMIN — LEVETIRACETAM 500 MG: 5 INJECTION INTRAVENOUS at 00:43

## 2022-04-30 RX ADMIN — LEVETIRACETAM 500 MG: 5 INJECTION INTRAVENOUS at 12:21

## 2022-04-30 RX ADMIN — INSULIN LISPRO 10 UNITS: 100 INJECTION, SOLUTION INTRAVENOUS; SUBCUTANEOUS at 09:15

## 2022-04-30 RX ADMIN — INSULIN LISPRO 8 UNITS: 100 INJECTION, SOLUTION INTRAVENOUS; SUBCUTANEOUS at 12:23

## 2022-04-30 RX ADMIN — DEXTROSE MONOHYDRATE 5 MG/HR: 5 INJECTION, SOLUTION INTRAVENOUS at 01:53

## 2022-04-30 RX ADMIN — DEXMEDETOMIDINE HYDROCHLORIDE 0.6 MCG/KG/HR: 4 INJECTION, SOLUTION INTRAVENOUS at 00:42

## 2022-04-30 RX ADMIN — DEXMEDETOMIDINE HYDROCHLORIDE 0.6 MCG/KG/HR: 4 INJECTION, SOLUTION INTRAVENOUS at 09:14

## 2022-04-30 RX ADMIN — CHLORHEXIDINE GLUCONATE 15 ML: 1.2 RINSE ORAL at 21:29

## 2022-04-30 RX ADMIN — ASPIRIN 325 MG: 325 TABLET, COATED ORAL at 17:43

## 2022-04-30 RX ADMIN — DEXTROSE MONOHYDRATE 2.5 MG/HR: 5 INJECTION, SOLUTION INTRAVENOUS at 12:47

## 2022-04-30 RX ADMIN — ANORECTAL OINTMENT: 15.7; .44; 24; 20.6 OINTMENT TOPICAL at 06:13

## 2022-04-30 RX ADMIN — VANCOMYCIN HYDROCHLORIDE 125 MG: 1 INJECTION, POWDER, LYOPHILIZED, FOR SOLUTION INTRAVENOUS at 12:19

## 2022-04-30 RX ADMIN — HEPARIN SODIUM 5000 UNITS: 5000 INJECTION INTRAVENOUS; SUBCUTANEOUS at 21:25

## 2022-04-30 RX ADMIN — SODIUM CHLORIDE, PRESERVATIVE FREE 20 MG: 5 INJECTION INTRAVENOUS at 09:09

## 2022-04-30 RX ADMIN — Medication 10 ML: at 09:09

## 2022-04-30 RX ADMIN — VANCOMYCIN HYDROCHLORIDE 125 MG: 1 INJECTION, POWDER, LYOPHILIZED, FOR SOLUTION INTRAVENOUS at 06:12

## 2022-04-30 RX ADMIN — INSULIN LISPRO 2 UNITS: 100 INJECTION, SOLUTION INTRAVENOUS; SUBCUTANEOUS at 17:43

## 2022-04-30 RX ADMIN — INSULIN LISPRO 2 UNITS: 100 INJECTION, SOLUTION INTRAVENOUS; SUBCUTANEOUS at 21:26

## 2022-04-30 ASSESSMENT — PULMONARY FUNCTION TESTS
PIF_VALUE: 25
PIF_VALUE: 20
PIF_VALUE: 34
PIF_VALUE: 11
PIF_VALUE: 10
PIF_VALUE: 10
PIF_VALUE: 20
PIF_VALUE: 25
PIF_VALUE: 21
PIF_VALUE: 23
PIF_VALUE: 25
PIF_VALUE: 39
PIF_VALUE: 30
PIF_VALUE: 11
PIF_VALUE: 20
PIF_VALUE: 20
PIF_VALUE: 36
PIF_VALUE: 24
PIF_VALUE: 31
PIF_VALUE: 10
PIF_VALUE: 23
PIF_VALUE: 24
PIF_VALUE: 24
PIF_VALUE: 11
PIF_VALUE: 22
PIF_VALUE: 22
PIF_VALUE: 25
PIF_VALUE: 40
PIF_VALUE: 21

## 2022-04-30 ASSESSMENT — PAIN SCALES - GENERAL
PAINLEVEL_OUTOF10: 0
PAINLEVEL_OUTOF10: 0
PAINLEVEL_OUTOF10: 1

## 2022-04-30 NOTE — PROGRESS NOTES
Comprehensive Nutrition Assessment    Type and Reason for Visit:  Reassess    Nutrition Recommendations/Plan:   1. Continue to work toward goal rate of EN     Malnutrition Assessment:  Malnutrition Status: At risk for malnutrition (Comment) (04/30/22 1504)    Context:  Acute Illness     Findings of the 6 clinical characteristics of malnutrition:  Energy Intake:  Mild decrease in energy intake (Comment)  Weight Loss:  No significant weight loss     Body Fat Loss:  No significant body fat loss     Muscle Mass Loss:  No significant muscle mass loss    Fluid Accumulation:  Mild Extremities   Strength:  Not Performed    Nutrition Assessment:    TF has been advanced to 45ml/hr. Residuals now ranging 0-250ml past 24 hours. Current residual NA at this time. New wt NA. Accuchek's 209-249    Nutrition Related Findings:    on vent,  No Propofol. rectal tube Wound Type: Deep Tissue Injury       Current Nutrition Intake & Therapies:    Average Meal Intake: NPO  Average Supplements Intake: NPO  Current Tube Feeding (TF) Orders:  · Feeding Route: Orogastric  · Formula: Peptide Based  · Schedule: Continuous  · Feeding Regimen: Vital AF 1.2 goal rate 54ml/hr  · Additives/Modulars: None  · Water Flushes: none  · Current TF & Flush Orders Provides: Vital AF 1.2 @ 45ml/hr = 1080 kcals with 94g protein, 119g CHO and 902ml free water  · Goal TF & Flush Orders Provides: Vital AF 1.2 @ 54ml/hr = 1555 kcals with 997g protein, 142g CHO and 1083 ml free water from formula      Anthropometric Measures:  Height: 5' 6\" (167.6 cm)  Ideal Body Weight (IBW): 130 lbs (59 kg)    Admission Body Weight: 165 lb (74.8 kg) (estimated)  Current Body Weight: 144 lb 11.2 oz (65.6 kg), 111.3 % IBW. Current BMI (kg/m2): 23.4  Usual Body Weight: 144 lb (65.3 kg) (1/2022)  % Weight Change (Calculated): 0.5  BMI Categories: Normal Weight (BMI 18.5-24. 9)    Estimated Daily Nutrient Needs:  Energy Requirements Based On: Kcal/kg  Weight Used for Energy Requirements: Current  Energy (kcal/day): 9402-0692 kcals (20-25 kcals/kg)  Weight Used for Protein Requirements: Current  Protein (g/day): 98g  Method Used for Fluid Requirements: 1 ml/kcal  Fluid (ml/day): 8931-6731 ml    Nutrition Diagnosis:   · Inadequate oral intake related to acute injury/trauma,impaired respiratory function as evidenced by NPO or clear liquid status due to medical condition,intubation,nutrition support - enteral nutrition      Nutrition Interventions:   Food and/or Nutrient Delivery: Continue Current Tube Feeding  Nutrition Education/Counseling: No recommendation at this time  Coordination of Nutrition Care: Continue to monitor while inpatient  Plan of Care discussed with: RN    Goals:  Previous Goal Met: Progressing toward Goal(s)  Goals: Meet at least 75% of estimated needs       Nutrition Monitoring and Evaluation:   Behavioral-Environmental Outcomes: None Identified  Food/Nutrient Intake Outcomes: Enteral Nutrition Intake/Tolerance  Physical Signs/Symptoms Outcomes: Biochemical Data,Weight,Skin,Nutrition Focused Physical Findings,Fluid Status or Edema    Discharge Planning:     Too soon to determine     Geovani Forde MS, RD, LD  Contact: [phone]

## 2022-04-30 NOTE — PROGRESS NOTES
Hospitalist Progress Note  [facility]     Patient: Maria Elena Vargas  : 1970  MRN: [id number]  Code Status: Full Code    Hospital Day: 6   Date of Service: 2022    Subjective:   Patient seen and examined. Intubated and sedated. Past Medical History:   Diagnosis Date    Acute ischemic stroke ([facility] 2022    Arthritis     Chronic kidney disease, stage 5, kidney failure (HCC)     Closed fracture of right shoulder girdle, with routine healing, subsequent encounter     DM (diabetes mellitus) type II controlled with renal manifestation ([facility] [address] 1993    Gastroparesis     GERD (gastroesophageal reflux disease)     Hemodialysis patient ([facility] [address]     dialysis on tues, thur, and sat at [facility]    Hypertension     Hypothyroid     Nasal congestion     recent    Neuropathy     Noncompliance with medications     Palliative care patient 10/08/2019    Restless leg syndrome        Past Surgical History:   Procedure Laterality Date    BREAST SURGERY Left     infected milk gland removed    BREAST SURGERY Right 2021    WEDGE EXCISION RIGHT BREAST DUCT WITH LACRIMAL DUCT PROBE, PEC BLOCK performed by Win Sweet MD at [address] [facility]          COLONOSCOPY Left 2020    Dr Gaurav Weeks hepatic flexure-Severe tortuosity with severe spasming, 1 yr recall    DIALYSIS FISTULA CREATION Left 2019    REVISION LEFT UPPER EXTREMITY AV ACCESS WITH LEFT BRACHIAL ARTERY TO LEFT AXILLARY VEIN WITH  INTERPOSITIONAL ARTEGRAFT   performed by Katerin Catherine MD at [address]  2012    [address] Right 2019    INSERTION OF RIGHT INTERNAL JUGULAR HEMODIALYSIS CATHETER performed by Katerin Catherine MD at [address] [facility]  2018    1.  Moderately increased stainable iron identified by special stain in Kupffer cells and occasional hepatocytes.  Negative for evidence of significant portal or lobular inflammation. Negative for evidence of significant fibrosis    [place] COLONOSCOPY W/BIOPSY SINGLE/MULTIPLE N/A 10/20/2017    Dr Hector Vu prep-Tubular AP (-) dysplasia x 2--3 yr recall    [place] EGD TRANSORAL BIOPSY SINGLE/MULTIPLE N/A 2016    Dr Ofe Bocanegra EGD TRANSORAL BIOPSY SINGLE/MULTIPLE N/A 10/20/2017    Dr Bridget Wilson (-)   [address] VASCULAR SURGERY Left 2016    fistula by Dr Casie Lopez at [address]  10/02/2017    [facility]. Left upper fistulograms/venograms, balloon angioplasty cephalic vein arch [id number] conquest.    VASCULAR SURGERY  2018    FISTULOGRAM    VASCULAR SURGERY  10/29/2018    [facility]. Left upper fistulograms/venograms    VASCULAR SURGERY  2018    [facility]. Arch aortogram,left upper arteriograms.  VASCULAR SURGERY  2019    [facility]. Removal of tunneled dialyis catheter right internal jugular vein.  VASCULAR SURGERY  2019    [facility]. Left upper extremity fistulogram including venography to the superior vena cava. Balloon angioplasty left subclavian vein with 10mm x 40mm conquest balloon. Balloon angioplasty mid/proximal upper arm cephalic vein with 44HA x 40mm conquest balloon. Venograms after balloon angioplasty. Stent left mid/proximal upper arm cephalic vein stenosis fluency 10mm x 60mm self-expanding covered stent. Balloon     VASCULAR SURGERY  2019    cont angioplasty stent with 10mm x 40mm conquest balloon. Completion venograms left upper extremity.        Family History   Problem Relation Age of Onset    Colon Cancer Mother          at 63    Colon Polyps Mother     Lung Cancer Father          at 79    Colon Polyps Father     Stomach Cancer Sister     Breast Cancer Sister 27    Depression Daughter 25        committed suicide    Liver Cancer Neg Hx     Liver Disease Neg Hx     Esophageal Cancer Neg Hx     Rectal Cancer Neg Hx        Social History     Socioeconomic History    Marital status: Single     Spouse name: Not on file  Number of children: 2    Years of education: Not on file    Highest education level: Not on file   Occupational History    Not on file   Tobacco Use    Smoking status: Current Every Day Smoker     Packs/day: 0.50     Years: 33.00     Pack years: 16.50     Types: Cigarettes    Smokeless tobacco: Never Used    Tobacco comment: NOW at 1/4 PD, started at age 24 and has averaged 2 PPD   Vaping Use    Vaping Use: Never used   Substance and Sexual Activity    Alcohol use: No    Drug use: Not Currently     Types: Marijuana Dena Aver)    Sexual activity: Not Currently     Partners: Male   Other Topics Concern    Not on file   Social History Narrative    Not on file     Social Determinants of Health     Financial Resource Strain:     Difficulty of Paying Living Expenses: Not on file   Food Insecurity:     Worried About Running Out of Food in the Last Year: Not on file    Kendy of Food in the Last Year: Not on file   Transportation Needs:     Lack of Transportation (Medical): Not on file    Lack of Transportation (Non-Medical):  Not on file   Physical Activity:     Days of Exercise per Week: Not on file    Minutes of Exercise per Session: Not on file   Stress:     Feeling of Stress : Not on file   Social Connections:     Frequency of Communication with Friends and Family: Not on file    Frequency of Social Gatherings with Friends and Family: Not on file    Attends Alevism Services: Not on file    Active Member of [address] [place] [address] or Organizations: Not on file    Attends Club or Organization Meetings: Not on file    Marital Status: Not on file   Intimate Partner Violence:     Fear of Current or Ex-Partner: Not on file    Emotionally Abused: Not on file    Physically Abused: Not on file    Sexually Abused: Not on file   Housing Stability:     Unable to Pay for Housing in the Last Year: Not on file    Number of [place] in the Last Year: Not on file    Unstable Housing in the Last Year: Not on file Current Facility-Administered Medications   Medication Dose Route Frequency Provider Last Rate Last Admin    aspirin suppository 300 mg  300 mg Rectal Daily Selena Andrade MD        niCARdipine (CARDENE) 25 mg in dextrose 5 % 250 mL infusion  2.5-15 mg/hr IntraVENous Continuous Selena Andrade MD 40 mL/hr at 04/30/22 1301 4 mg/hr at 04/30/22 1301    dexmedetomidine (PRECEDEX) 400 mcg in sodium chloride 0.9 % 100 mL infusion  0.1-1.5 mcg/kg/hr IntraVENous Continuous Cong Baar, DO 3.3 mL/hr at 04/30/22 1558 0.2 mcg/kg/hr at 04/30/22 1558    menthol-zinc oxide (CALMOSEPTINE) 0.44-20.6 % ointment   Topical BID Juanis Diamond MD   Given at 04/30/22 0498    sodium phosphate 10.5 mmol in sodium chloride 0.9 % 250 mL IVPB  0.16 mmol/kg IntraVENous PRN Patience Walter, DO        Or    sodium phosphate 21 mmol in sodium chloride 0.9 % 250 mL IVPB  0.32 mmol/kg IntraVENous PRN Patience Walter, DO        insulin lispro (HUMALOG) injection vial 0-12 Units  0-12 Units SubCUTAneous TID WC Cong Baar, DO   8 Units at 04/30/22 1223    insulin lispro (HUMALOG) injection vial 0-6 Units  0-6 Units SubCUTAneous Nightly Cong Baar, DO   2 Units at 04/29/22 2102    vancomycin (VANCOCIN) oral solution 125 mg  125 mg Oral 4 times per day Cong Baar, DO   125 mg at 04/30/22 1219    hydrALAZINE (APRESOLINE) tablet 100 mg  100 mg Oral 3 times per day Patience Walter, DO   100 mg at 04/28/22 2014    propofol injection  5-50 mcg/kg/min IntraVENous Once Cong Baar, DO 11.2 mL/hr at 04/24/22 1505 25 mcg/kg/min at 04/24/22 1505    glucagon (rDNA) injection 1 mg  1 mg IntraMUSCular PRN Cong Baar, DO        dextrose 5 % solution  100 mL/hr IntraVENous PRN Cong Baar, DO        glucose chewable tablet 16 g  4 tablet Oral PRN Cong Baar, DO        dextrose bolus (hypoglycemia) 10% 125 mL  125 mL IntraVENous PRN Cong DO Mirta   Stopped at 04/25/22 0227    Or    dextrose bolus (hypoglycemia) 10% 250 mL  250 mL IntraVENous PRN Camiloine Sky, DO        sodium chloride flush 0.9 % injection 5-40 mL  5-40 mL IntraVENous 2 times per day Rui Swanson, DO   10 mL at 04/30/22 0062    sodium chloride flush 0.9 % injection 5-40 mL  5-40 mL IntraVENous PRN Rui Swanson, DO        0.9 % sodium chloride infusion   IntraVENous PRN Rui Swanson, DO        LORazepam (ATIVAN) injection 1 mg  1 mg IntraVENous Q5 Min PRN Rui Swanson, DO        heparin (porcine) injection 5,000 Units  5,000 Units SubCUTAneous TID Rui Swanson, DO   5,000 Units at 04/30/22 1558    ondansetron (ZOFRAN-ODT) disintegrating tablet 4 mg  4 mg Oral Q8H PRN Rui Swanson, DO        Or    ondansetron [facility]) injection 4 mg  4 mg IntraVENous Q6H PRN Rui Swanson, DO        polyethylene glycol (GLYCOLAX) packet 17 g  17 g Oral Daily PRN Rui Swanson, DO        acetaminophen (TYLENOL) tablet 650 mg  650 mg Oral Q6H PRN Rui Swanson, DO   650 mg at 04/25/22 3399    Or    acetaminophen (TYLENOL) suppository 650 mg  650 mg Rectal Q6H PRN Rui Swanson, DO        levETIRAcetam (KEPPRA) 500 mg/100 mL IVPB  500 mg IntraVENous Q12H Rui Swanson, DO   Stopped at 04/30/22 1248    chlorhexidine (PERIDEX) 0.12 % solution 15 mL  15 mL Mouth/Throat BID Rui Swanson, DO   15 mL at 04/30/22 0913    famotidine (PEPCID) 20 mg in sodium chloride (PF) 10 mL injection  20 mg IntraVENous Daily Rui Swanson, DO   20 mg at 04/30/22 5530    propofol injection  5-50 mcg/kg/min IntraVENous Continuous Rui Swanson, DO   Stopped at 04/28/22 8028    magic butt cream   Topical Q4H PRN Rui Swanson, DO   Given at 04/25/22 0028    magnesium sulfate 1000 mg in dextrose 5% 100 mL IVPB  1,000 mg IntraVENous PRN Patience Walter, DO        potassium bicarb-citric acid (EFFER-K) effervescent tablet 40 mEq  40 mEq Oral PRN Clyda Frieda, DO        Or    potassium chloride 10 mEq/100 mL IVPB (Peripheral Line)  10 mEq IntraVENous PRN Patience Walter, DO   Stopped at 04/25/22 0936         niCARdipine 4 mg/hr (04/30/22 1301)    dexmedetomidine HCl in NaCl 0.2 mcg/kg/hr (04/30/22 1558)    dextrose      sodium chloride      propofol Stopped (04/28/22 0323)        Objective:   BP (!) 195/70   Pulse 90   Temp 97.8 °F (36.6 °C) (Temporal)   Resp 9   Ht 5' 6\" (1.676 m)   Wt 144 lb 11.2 oz (65.6 kg)   SpO2 99%   BMI 23.36 kg/m²     General: no acute distress  HEENT: normocephalic, atraumatic  Neck: supple, symmetrical, trachea midline   Lungs: clear to auscultation bilaterally   Cardiovascular: s1 and s2 normal  Abdomen: soft, positive bowel sounds  Extremities: no cyanosis   Neuro: intubated and sedated    Recent Labs     04/28/22 0203 04/29/22  0846 04/30/22  0153   WBC 6.9 6.5 7.8   RBC 3.29* 3.35* 3.40*   HGB 9.6* 9.6* 9.7*   HCT 30.2* 31.7* 31.5*   MCV 91.8 94.6 92.6   MCH 29.2 28.7 28.5   MCHC 31.8* 30.3* 30.8*    110* 132     Recent Labs     04/28/22 0203 04/29/22 0722 04/30/22  0153   * 128* 127*   K 3.4* 3.9 3.8   ANIONGAP 19 21* 21*   CL 83* 90* 88*   CO2 21* 17* 18*   BUN 32* 19 26*   CREATININE 3.5* 2.7* 3.3*   GLUCOSE 235* 305* 264*   CALCIUM 8.5* 8.2* 8.9     Recent Labs     04/28/22 0203 04/29/22  0722 04/30/22  0153   MG 2.1 2.3 2.4   PHOS 4.9* 3.6 4.1     Recent Labs     04/28/22 0203 04/29/22 0722 04/30/22  0153   AST 13 14 13   ALT 9 7 7   BILITOT 0.4 0.4 0.4   ALKPHOS 109* 96 106*     No results for input(s): PH, PO2, PCO2, HCO3, BE, O2SAT in the last 72 hours. No results for input(s): TROPONINI in the last 72 hours. No results for input(s): INR in the last 72 hours. No results for input(s): LACTA in the last 72 hours.       Intake/Output Summary (Last 24 hours) at 4/30/2022 1609  Last data filed at 4/30/2022 0541  Gross per 24 hour   Intake 1496.17 ml   Output 10 ml   Net 1486.17 ml VL DUP CAROTID BILATERAL    Result Date: 4/29/2022  Vascular Carotid Procedure  Demographics   Patient Name    Ravi Alcaraz Age                   47   Patient Number  [id number]       Gender                Female   Visit Number    [id number]    Interpreting          Kena Everett                               Physician   Date of Birth   1970   Referring Physician   Lizzy Cuevas   Accession       [id number]   [address], [place], [address]  Number  Procedure Type of Study:   Cerebral:Carotid, VL CAROTID BILATERAL. Indications for Study:Stroke. Risk Factors   - The patient's risk factor(s) include: diabetes mellitus and arterial     hypertension.   - Current - Every day. Allergies   - Penicillins.   - Stadol.   - Other allergy:(Lamisil Advanced). Technical Quality:Limited visualization due to patient on ventilator. Impression   There is mixed plaque visualized in the bilateral internal carotid  artery(ies). There is less than 50% stenosis in the right internal carotid artery. There is less than 50% stenosis of the left internal carotid artery. There is normal antegrade flow in the bilateral vertebral artery(ies). right side is very limited due to positioning, her chin is resting on her  right clavicle and she is vented. Signature   ----------------------------------------------------------------  Electronically signed by Eddie Leon(Interpreting  physician) on 04/29/2022 03:20 PM  ----------------------------------------------------------------  Blood Pressure:Right arm 178/69 mmHg. Velocities are measured in cm/s ; Diameters are measured in mm Carotid Right Measurements +------------+-------+-------+--------+-------+------------+---------------+ ! Location    ! PSV    ! EDV    ! Angle   ! RI     !%Stenosis   ! Tortuosity     ! +------------+-------+-------+--------+-------+------------+---------------+ ! Prox CCA    !47.2   !       !60      !       !            !               ! +------------+-------+-------+--------+-------+------------+---------------+ ! Mid CCA     !47.2   !       !60      !       !            !               ! +------------+-------+-------+--------+-------+------------+---------------+ ! Prox ICA    !41     !7.46   !60      !0.82   !            !               ! +------------+-------+-------+--------+-------+------------+---------------+ ! Mid ICA     !62.7   !11.2   !60      !0.82   !            !               ! +------------+-------+-------+--------+-------+------------+---------------+ ! Dist ICA    !75.2   !13.7   !60      !0.82   !            !               ! +------------+-------+-------+--------+-------+------------+---------------+ ! Prox ECA    !147    !       !61      !       !            !               ! +------------+-------+-------+--------+-------+------------+---------------+ ! Vertebral   !47.2   !13.7   !60      !0.71   !            !               ! +------------+-------+-------+--------+-------+------------+---------------+   - There is antegrade vertebral flow noted on the right side. - Additional Measurements:ICAPSV/CCAPSV 1.59. Carotid Left Measurements +------------+-------+-------+--------+-------+------------+---------------+ ! Location    ! PSV    ! EDV    ! Angle   ! RI     !%Stenosis   ! Tortuosity     ! +------------+-------+-------+--------+-------+------------+---------------+ ! Prox CCA    !81.4   !9.94   !60      !0.88   !            !               ! +------------+-------+-------+--------+-------+------------+---------------+ ! Mid CCA     !70.2   !7.46   !60      !0.89   !            !               ! +------------+-------+-------+--------+-------+------------+---------------+ ! Prox ICA    !69.4   !11.9   !60      !0.83   !            !               ! +------------+-------+-------+--------+-------+------------+---------------+ ! Mid ICA     !80.6   !19.6   !60      !0.76   !            !               ! +------------+-------+-------+--------+-------+------------+---------------+ ! Dist ICA    !70.3   !12.5   !50      !0.82   !            !               ! +------------+-------+-------+--------+-------+------------+---------------+ ! Prox ECA    !113    !       !61      !       !            !               ! +------------+-------+-------+--------+-------+------------+---------------+ ! Vertebral   !38.8   !       !60      !       !            !               ! +------------+-------+-------+--------+-------+------------+---------------+   - There is antegrade vertebral flow noted on the left side. - Additional Measurements:ICAPSV/CCAPSV 0.99. ICAEDV/CCAEDV 1.97. MRI BRAIN WO CONTRAST    Result Date: 4/29/2022  Examination. MRI BRAIN WO CONTRAST 4/29/2022 11:43 AM History: Seizure activity. The multiplanar, multisequence MR imaging of the brain is performed without contrast enhancement. There is no previous similar study for comparison. The correlation made with CT scan of the head dated 4/24/2022. The diffusion-weighted imaging sequence including the ADC map show an area of acute infarction involving the left posterior frontal, parietal and basal occipital lobe, the left middle cerebral artery territory. A small linear focus of T2 signal in the right posterior paraventricular white matter has no corresponding ADC map changes and may represent a subacute or chronic process. The targeted images of the mesial temporal lobe show moderate symmetrical mesial temporal atrophy without presence of any abnormal signal. This may be a process of general volume loss/atrophy. Moderately dilated ventricles, basal cisterns and the cortical sulci suggestive chronic volume loss/atrophy. The orbits are suboptimally evaluated. No obvious abnormality.  Limited visualized optic nerve, optic chiasma and optic tracts are normal and symmetrical. The pituitary gland appears normal. The corpus callosum, the brainstem and cerebellum are normal. The FLAIR sequence images show significant periventricular T2 signal changes representing chronic white matter ischemia and transependymal CSF flow. The gradient recalled imaging sequence show normal flow void in the visualized intracranial vessels, particularly normal flow in the limited visualized left middle cerebral artery. There is mucosal thickening involving the ethmoid sinuses. The bilateral mastoid effusion. 1. Acute left middle cerebral territory infarct. 2. Chronic ischemic and atrophic changes. 3. The symmetrical mesial temporal atrophy is probably a part of the generalized volume loss. 4. Ethmoid sinusitis. Bilateral mastoid effusion. The above report was immediately called to the nurse in charge of the patient in CCU.  Signed by Dr Endy Ty and Plan:   Acute left MCA infarct  Per MRI brain 4/29/2022  Neurology following  Neurochecks  Meds per neurology     Seizure  Neurology following  AEDs per neurology  Seizure precautions     Ventilator dependent acute respiratory failure  Secondary to above processes  Titrate minute ventilation for pH 7.35  Follow ABG/CXR  Follow plateau pressure  Repeat SBT tomorrow     ESRD on HD  Renal following     Medical noncompliance       C. difficile infection  Oral vancomycin     Tobacco dependence       [facility]  Since resolved  History of IDDM2  Meds on board  Follow Accu-Cheks/electrolytes     DVT prophylaxis  Heparin    Total critical care time: 51 minutes    Laya Klein MD   4/30/2022 4:09 PM

## 2022-04-30 NOTE — PROGRESS NOTES
[facility] Progress Note      Patient:   John Pedersen  MR#:    [id number]   Room:    [facility]/702-   YOB: 1970  Date of Progress Note: 4/30/2022  Time of Note                           8:27 AM  Consulting Physician:  Yola Benson DO  Attending Physician:  Kimberly Espitia MD      INTERVAL HISTORY: No new issues overnight. Nursing indicates to squeeze left arm but no movement on the right    REVIEW OF SYSTEMS:  Unable to obtain     PHYSICAL EXAM:    Constitutional    BP (!) 163/66   Pulse 73   Temp 98 °F (36.7 °C) (Temporal)   Resp 16   Ht 5' 6\" (1.676 m)   Wt 144 lb 11.2 oz (65.6 kg)   SpO2 98%   BMI 23.36 kg/m²     Constitutional  well developed, well nourished. Eyes  conjunctiva normal.   Ear, nose, throat - No scars, masses, or lesions over external nose or ears, no atrophy of tongue  Neck-symmetric, no masses noted, no jugular vein distension  Respiration- chest wall appears symmetric, good expansion,   normal effort without use of accessory muscles  Musculoskeletal  no significant wasting of muscles noted, no bony deformities  Extremities-no clubbing, cyanosis or edema  Skin  warm, dry, and intact. No rash, erythema, or pallor. Psychiatric  mood, affect, and behavior unable to assess     Neurological exam  Eyes open occasionally to stimulation, intubated on Precedex  Conjugate eye movement  Spontaneous movement in the left hand noted, no movement of the right arm  No tremors in hands or head noted         LABS/IMAGING:    CT Head WO Contrast    Result Date: 4/24/2022  CT HEAD WO CONTRAST 4/24/2022 2:01 PM HISTORY: Altered mental status COMPARISON: CT scan dated 11/18/2021 DOSE LENGTH PRODUCT: 766 mGy cm TECHNIQUE: Helical tomographic images of the brain were obtained without the use of intravenous contrast. Automated exposure control was also utilized to decrease patient radiation dose. FINDINGS: There is no evidence of evolving large vascular territory infarct.  No visualized intra-axial or extra-axial hemorrhage. No mass lesion is identified. Normal size and configuration of the ventricular system. The basal cisterns are symmetric. Posterior fossa structures are unremarkable. The included orbits and their contents are unremarkable. Chronic paranasal sinus disease on the left. The visualized osseous structures and overlying soft tissues of the skull and face are unremarkable. 1. No acute intracranial process. Signed by Dr Davis Ennis    Result Date: 4/24/2022  XR CHEST PORTABLE 4/24/2022 4:27 PM HISTORY: ET tube, enteric tube  Technique: Single AP view of the chest COMPARISONS: 4/24/2022 FINDINGS: Endotracheal tube is identified with tip essentially touching the krysta. Enteric tube courses into the stomach with tip curled in the fundus back towards the gastroesophageal junction. The lungs are clear and well expanded. Heart size is stable. Pulmonary vasculature are nondilated. No pleural effusion or pneumothorax. 1. Endotracheal tube is deep, tip essentially touching the krysta. Lungs are clear and well expanded. I would recommend 2-3 cm withdrawal for a more optimal positioning. The results of this exam were discussed with Zaira Rosas (ICU nursing) on 4/24/2022 at 1640 hours Signed by Dr Peyman Ramirez    XR CHEST PORTABLE    Result Date: 4/24/2022  XR CHEST PORTABLE 4/24/2022 1:17 PM HISTORY: ET tube placement  Technique: Single AP view of the chest COMPARISONS: 4/24/2022 FINDINGS: Status post endotracheal intubation. ET tube is in good position. Lungs are well-expanded. Enteric tube curls on itself in the midesophagus and then extends back up into the throat. Heart size is stable. Pulmonary vasculature are nondilated. 1. ET tube is in good position. Lungs are clear and well expanded. 2. Enteric tube curls on itself in the mid esophagus before extending back up into the throat. This will need repositioned.  The results of this exam were discussed with Dr. JUÁREZ [facility] on 4/24/2022 at 1329 hours Signed by Dr Harmony Gardiner    XR CHEST PORTABLE    Result Date: 4/24/2022  XR CHEST PORTABLE 4/24/2022 12:34 PM HISTORY: Altered mental status  Technique: Single AP view of the chest COMPARISONS: Chest exam dated 4/1/2022 FINDINGS: No lung consolidation. No pleural effusion or pneumothorax. Cardiomediastinal silhouette and pulmonary vasculature are unremarkable. No acute bony abnormality. Left subclavian region vascular stent with additional stent projecting over the left humerus. No acute cardiopulmonary process. Signed by Dr Harmony Gardiner      Lab Results   Component Value Date    WBC 7.8 04/30/2022    HGB 9.7 (L) 04/30/2022    HCT 31.5 (L) 04/30/2022    MCV 92.6 04/30/2022     04/30/2022     Lab Results   Component Value Date     (L) 04/30/2022    K 3.8 04/30/2022    CL 88 (L) 04/30/2022    CO2 18 (L) 04/30/2022    BUN 26 (H) 04/30/2022    CREATININE 3.3 (H) 04/30/2022    GLUCOSE 264 (H) 04/30/2022    CALCIUM 8.9 04/30/2022    PROT 5.9 (L) 04/30/2022    LABALBU 3.2 (L) 04/30/2022    BILITOT 0.4 04/30/2022    ALKPHOS 106 (H) 04/30/2022    AST 13 04/30/2022    ALT 7 04/30/2022    LABGLOM 15 (A) 04/30/2022    GFRAA 18 (L) 04/30/2022     Lab Results   Component Value Date    INR 0.96 01/01/2022    INR 0.97 11/18/2021    INR 1.06 05/29/2021    PROTIME 13.0 01/01/2022    PROTIME 13.1 11/18/2021    PROTIME 13.7 05/29/2021       RECORD REVIEW:   Previous medical records, medications were reviewed at today's visit. Nursing/physician notes, imaging, labs and vitals reviewed. ASSESSMENT:  47 y.o. admitted after an episode of unresponsiveness followed by generalized tonic-clonic event in the emergency department. She had profoundly elevated glucose, possible hyperglycemic coma, hyponatremia, underlying UTI,  end-stage renal disease with a history of noncompliance noted as well. Head CT nothing acute.  No seizure noted on cEEG    Acute ischemic stroke-extensive stroke throughout much of the left MCA distribution, left occipital lobe and possibility small acute focus in the right posterior periventricular region most suggestive of a cardioembolic event       Continue addressing blood glucose, hyponatremia, UTI, ESRD nephrology following  Wean precedex, extubate when able     Carotid ultrasound less than 50% bilaterally  Echocardiogram pending    Start aspirin 300 mg rectally daily    C. difficileon vancomycin  Respiratory failurewean off vent as able  Hypertension-Cardene dripokay to keep blood pressure [id number]-fenpy hypotension  DVT prophylaxisHeparin subcu  GIPepcid  Seizurecontinue Keppra  End stage renal disease/bowel disturbanceson dialysis  Diabetesmonitor blood sugar    Supportive care      Please feel free to call with any questions. [phone] (cell phone).       Radhames Huggins MD  Board Certified Neurology

## 2022-04-30 NOTE — PROGRESS NOTES
Nephrology ([facility]) Progress Note    Patient:  Maykel Benoit  YOB: 1970  Date of Service: 4/30/2022  MRN: [id number]   Acct: [de-identified]   Primary Care Physician: ROMINA Tomlinson  Advance Directive: Full Code  Admit Date: 4/24/2022       Hospital Day: 6  Referring Provider: Hollie Boeck, MD    Patient independently seen and examined, Chart, Consults, Notes, Operative notes, Labs, Cardiology, and Radiology studies reviewed as available. Chief complaint: Altered mental status/end-stage renal disease    Subjective:  Maykel Benoit is a 47 y.o. female for whom we were consulted for evaluation and treatment of end-stage renal disease. Patient also has history of seizure disorder, type 2 diabetes, hypertension, frequent hospitalization with noncompliance and poorly controlled diabetes. Patient was brought to the emergency room after she was found to have unresponsiveness and witnessed grand mal seizure. She was brought in by ambulance. On arrival in the emergency room she was found to be severely hyperglycemic and had a urinary tract infection. Patient was intubated, sedated and currently being treated by neurology for grand mal seizure. She is also receiving IV antibiotics and insulin drip. Her blood sugar control has significantly improved. On April 25, she has received emergent hemodialysis treatment for correction of volume status as well as hyponatremia. She is now moved from ICU to CCU. This morning patient is intubated/not sedated. On April 30, underwent MRI of the brain consistent with acute brain infarct in the territory of left middle cerebral artery. Currently she is on weaning protocol.   .    Allergies:  Penicillins, Lamisil advanced [terbinafine], Reglan [metoclopramide], and Stadol [butorphanol]    Medicines:  Current Facility-Administered Medications   Medication Dose Route Frequency Provider Last Rate Last Admin    aspirin suppository 300 mg 300 mg Rectal Daily Cameron Gavin MD        niCARdipine (CARDENE) 25 mg in dextrose 5 % 250 mL infusion  2.5-15 mg/hr IntraVENous Continuous Cameron Gavin MD 50 mL/hr at 04/30/22 0400 5 mg/hr at 04/30/22 0400    dexmedetomidine (PRECEDEX) 400 mcg in sodium chloride 0.9 % 100 mL infusion  0.1-1.5 mcg/kg/hr IntraVENous Continuous Berclair Curb, DO 11.5 mL/hr at 04/30/22 0400 0.7 mcg/kg/hr at 04/30/22 0400    menthol-zinc oxide (CALMOSEPTINE) 0.44-20.6 % ointment   Topical BID Shanelle Figueroa MD   Given at 04/30/22 3361    sodium phosphate 10.5 mmol in sodium chloride 0.9 % 250 mL IVPB  0.16 mmol/kg IntraVENous PRN Patience Walter, DO        Or    sodium phosphate 21 mmol in sodium chloride 0.9 % 250 mL IVPB  0.32 mmol/kg IntraVENous PRN Patience Walter, DO        insulin lispro (HUMALOG) injection vial 0-12 Units  0-12 Units SubCUTAneous TID WC Berclair Curb, DO   4 Units at 04/29/22 1730    insulin lispro (HUMALOG) injection vial 0-6 Units  0-6 Units SubCUTAneous Nightly Berclair Curb, DO   2 Units at 04/29/22 2102    vancomycin (VANCOCIN) oral solution 125 mg  125 mg Oral 4 times per day Berclair Curb, DO   125 mg at 04/30/22 2772    hydrALAZINE (APRESOLINE) tablet 100 mg  100 mg Oral 3 times per day Patience Walter, DO   100 mg at 04/28/22 2014    propofol injection  5-50 mcg/kg/min IntraVENous Once Berclair Curb, DO 11.2 mL/hr at 04/24/22 1505 25 mcg/kg/min at 04/24/22 1505    glucagon (rDNA) injection 1 mg  1 mg IntraMUSCular PRN Berclair Curb, DO        dextrose 5 % solution  100 mL/hr IntraVENous PRN Berclair Curb, DO        glucose chewable tablet 16 g  4 tablet Oral PRN Berclair Curb, DO        dextrose bolus (hypoglycemia) 10% 125 mL  125 mL IntraVENous PRN Berclair Curb, DO   Stopped at 04/25/22 8002    Or    dextrose bolus (hypoglycemia) 10% 250 mL  250 mL IntraVENous PRN Berclair Curb, DO        sodium chloride flush 0.9 % injection 5-40 mL  5-40 mL IntraVENous 2 times per day Tristian Clines, DO   10 mL at 04/30/22 9096    sodium chloride flush 0.9 % injection 5-40 mL  5-40 mL IntraVENous PRN Tristian Clines, DO        0.9 % sodium chloride infusion   IntraVENous PRN Tristian Clines, DO        LORazepam (ATIVAN) injection 1 mg  1 mg IntraVENous Q5 Min PRN Tristian Clines, DO        heparin (porcine) injection 5,000 Units  5,000 Units SubCUTAneous TID Tristian Clines, DO   5,000 Units at 04/30/22 3374    ondansetron (ZOFRAN-ODT) disintegrating tablet 4 mg  4 mg Oral Q8H PRN Tristian Clines, DO        Or    ondansetron [facility]) injection 4 mg  4 mg IntraVENous Q6H PRN Tristian Clines, DO        polyethylene glycol [facility]) packet 17 g  17 g Oral Daily PRN Tristian Clines, DO        acetaminophen (TYLENOL) tablet 650 mg  650 mg Oral Q6H PRN Tristian Clines, DO   650 mg at 04/25/22 0097    Or    acetaminophen (TYLENOL) suppository 650 mg  650 mg Rectal Q6H PRN Trsitian Clines, DO        levETIRAcetam (KEPPRA) 500 mg/100 mL IVPB  500 mg IntraVENous Q12H Tristian Clines, DO   Stopped at 04/30/22 0058    chlorhexidine (PERIDEX) 0.12 % solution 15 mL  15 mL Mouth/Throat BID Tristian Clines, DO   15 mL at 04/29/22 2055    famotidine (PEPCID) 20 mg in sodium chloride (PF) 10 mL injection  20 mg IntraVENous Daily Tristian Clines, DO   20 mg at 04/30/22 1438    propofol injection  5-50 mcg/kg/min IntraVENous Continuous Tristian Clines, DO   Stopped at 04/28/22 4071    magic butt cream   Topical Q4H PRN Tristian Clines, DO   Given at 04/25/22 0028    magnesium sulfate 1000 mg in dextrose 5% 100 mL IVPB  1,000 mg IntraVENous PRN Patience Walter, DO        potassium bicarb-citric acid (EFFER-K) effervescent tablet 40 mEq  40 mEq Oral PRN Patience Walter, DO        Or    potassium chloride 10 mEq/100 mL IVPB (Peripheral Line)  10 mEq IntraVENous PRN Kasandra Stanford DO Stopped at 04/25/22 0936       Past Medical History:  Past Medical History:   Diagnosis Date    Acute ischemic stroke ([facility] 4/30/2022    Arthritis     Chronic kidney disease, stage 5, kidney failure (HCC)     Closed fracture of right shoulder girdle, with routine healing, subsequent encounter     DM (diabetes mellitus) type II controlled with renal manifestation ([facility] 06/1993    Gastroparesis     GERD (gastroesophageal reflux disease)     Hemodialysis patient ([facility]     dialysis on tues, thur, and sat [facility]    Hypertension     Hypothyroid     Nasal congestion     recent    Neuropathy     Noncompliance with medications     Palliative care patient 10/08/2019    Restless leg syndrome        Past Surgical History:  Past Surgical History:   Procedure Laterality Date    BREAST SURGERY Left 2008    infected milk gland removed    BREAST SURGERY Right 5/25/2021    WEDGE EXCISION RIGHT BREAST DUCT WITH LACRIMAL DUCT PROBE, PEC BLOCK performed by Win Sweet MD at [facility]      2008    COLONOSCOPY Left 9/17/2020    Dr Gaurav Weeks hepatic flexure-Severe tortuosity with severe spasming, 1 yr recall    DIALYSIS FISTULA CREATION Left 1/25/2019    REVISION LEFT UPPER EXTREMITY AV ACCESS WITH LEFT BRACHIAL ARTERY TO LEFT AXILLARY VEIN WITH  INTERPOSITIONAL ARTEGRAFT   performed by Katerin Catherine MD at [address]  2012    [address] Right 1/25/2019    INSERTION OF RIGHT INTERNAL JUGULAR HEMODIALYSIS CATHETER performed by Katerin Catherine MD at [facility]  08/17/2018    1.  Moderately increased stainable iron identified by special stain in Kupffer cells and occasional hepatocytes. Negative for evidence of significant portal or lobular inflammation.  Negative for evidence of significant fibrosis    HI COLONOSCOPY W/BIOPSY SINGLE/MULTIPLE N/A 10/20/2017    Dr Bobby Swenson prep-Tubular AP (-) dysplasia x 2--3 yr recall    HI EGD TRANSORAL BIOPSY SINGLE/MULTIPLE N/A 2016    Dr Sidra Bernal EGD TRANSORAL BIOPSY SINGLE/MULTIPLE N/A 10/20/2017    Dr Colleen Harden (-)    TUBAL LIGATION          VASCULAR SURGERY Left 2016    fistula by Dr Melvin Park at [address]  10/02/2017    [facility]. Left upper fistulograms/venograms, balloon angioplasty cephalic vein arch 34O32 conquest.    VASCULAR SURGERY  2018    FISTULOGRAM    VASCULAR SURGERY  10/29/2018    [facility]. Left upper fistulograms/venograms    VASCULAR SURGERY  2018    [facility]. Arch aortogram,left upper arteriograms.  VASCULAR SURGERY  2019    [facility]. Removal of tunneled dialyis catheter right internal jugular vein.  VASCULAR SURGERY  2019    [facility]. Left upper extremity fistulogram including venography to the superior vena cava. Balloon angioplasty left subclavian vein with 10mm x 40mm conquest balloon. Balloon angioplasty mid/proximal upper arm cephalic vein with 07ZB x 40mm conquest balloon. Venograms after balloon angioplasty. Stent left mid/proximal upper arm cephalic vein stenosis fluency 10mm x 60mm self-expanding covered stent. Balloon     VASCULAR SURGERY  2019    cont angioplasty stent with 10mm x 40mm conquest balloon. Completion venograms left upper extremity.        Family History  Family History   Problem Relation Age of Onset    Colon Cancer Mother          at 63    Colon Polyps Mother     Lung Cancer Father          at 79    Colon Polyps Father     Stomach Cancer Sister     Breast Cancer Sister 27    Depression Daughter 25        committed suicide    Liver Cancer Neg Hx     Liver Disease Neg Hx     Esophageal Cancer Neg Hx     Rectal Cancer Neg Hx        Social History  Social History     Socioeconomic History    Marital status: Single     Spouse name: Not on file    Number of children: 2    Years of education: Not on file    Highest education level: Not on file   Occupational History    Not on file   Tobacco Use    Smoking status: Current Every Day Smoker     Packs/day: 0.50     Years: 33.00     Pack years: 16.50     Types: Cigarettes    Smokeless tobacco: Never Used    Tobacco comment: NOW at 1/4 PD, started at age 24 and has averaged 2 PPD   Vaping Use    Vaping Use: Never used   Substance and Sexual Activity    Alcohol use: No    Drug use: Not Currently     Types: Marijuana [place])    Sexual activity: Not Currently     Partners: Male   Other Topics Concern    Not on file   Social History Narrative    Not on file     Social Determinants of Health     Financial Resource Strain:     Difficulty of Paying Living Expenses: Not on file   Food Insecurity:     Worried About Running Out of Food in the Last Year: Not on file    Kendy of Food in the Last Year: Not on file   Transportation Needs:     Lack of Transportation (Medical): Not on file    Lack of Transportation (Non-Medical):  Not on file   Physical Activity:     Days of Exercise per Week: Not on file    Minutes of Exercise per Session: Not on file   Stress:     Feeling of Stress : Not on file   Social Connections:     Frequency of Communication with Friends and Family: Not on file    Frequency of Social Gatherings with Friends and Family: Not on file    Attends Zoroastrianism Services: Not on file    Active Member of [address] [place] [address] [employer] or Organizations: Not on file    Attends Club or Organization Meetings: Not on file    Marital Status: Not on file   Intimate Partner Violence:     Fear of Current or Ex-Partner: Not on file    Emotionally Abused: Not on file    Physically Abused: Not on file    Sexually Abused: Not on file   Housing Stability:     Unable to Pay for Housing in the Last Year: Not on file    Number of [place] in the Last Year: Not on file    Unstable Housing in the Last Year: Not on file         Review of Systems:  Review of system is unobtainable as she is intubated and sedated.}       Objective:  Patient Vitals for the past 24 hrs:   BP Temp 128* 127*   K 3.4* 3.9 3.8   CL 83* 90* 88*   CO2 21* 17* 18*   PHOS 4.9* 3.6 4.1   BUN 32* 19 26*   CREATININE 3.5* 2.7* 3.3*   CALCIUM 8.5* 8.2* 8.9     CBC:   Recent Labs     04/28/22  0203 04/29/22  0846 04/30/22  0153   WBC 6.9 6.5 7.8   HGB 9.6* 9.6* 9.7*   HCT 30.2* 31.7* 31.5*   MCV 91.8 94.6 92.6    110* 132     LIVER PROFILE:   Recent Labs     04/28/22  0203 04/29/22  0722 04/30/22  0153   AST 13 14 13   ALT 9 7 7   BILITOT 0.4 0.4 0.4   ALKPHOS 109* 96 106*     PT/INR: No results for input(s): PROTIME, INR in the last 72 hours. APTT: No results for input(s): APTT in the last 72 hours. BNP:  No results for input(s): BNP in the last 72 hours. Ionized Calcium:No results for input(s): IONCA in the last 72 hours. Magnesium:  Recent Labs     04/28/22  0203 04/29/22  0722 04/30/22  0153   MG 2.1 2.3 2.4     Phosphorus:  Recent Labs     04/28/22  0203 04/29/22  0722 04/30/22  0153   PHOS 4.9* 3.6 4.1     HgbA1C:   No results for input(s): LABA1C in the last 72 hours. Hepatic:   Recent Labs     04/28/22  0203 04/29/22  0722 04/30/22  0153   ALKPHOS 109* 96 106*   ALT 9 7 7   AST 13 14 13   PROT 5.9* 5.6* 5.9*   BILITOT 0.4 0.4 0.4   LABALBU 3.3* 3.0* 3.2*     Lactic Acid: No results for input(s): LACTA in the last 72 hours. Troponin: No results for input(s): CKTOTAL, CKMB, TROPONINT in the last 72 hours. ABGs:   Lab Results   Component Value Date    PHART 7.330 04/24/2022    PO2ART 389.0 04/24/2022    AJP7BWE 41.0 04/24/2022     CRP:  No results for input(s): CRP in the last 72 hours. Sed Rate:  No results for input(s): SEDRATE in the last 72 hours. Culture Results:   Blood Culture Recent:   Recent Labs     04/24/22  1447   BC No growth after 5 days of incubation.        Urine Culture Recent :   Recent Labs     04/24/22  1354   LABURIN No growth at 36-48 hours       Radiology reports as per the Radiologist  Radiology: CT Head WO Contrast    Result Date: 4/24/2022  CT HEAD WO CONTRAST 4/24/2022 2:01 PM HISTORY: Altered mental status COMPARISON: CT scan dated 11/18/2021 DOSE LENGTH PRODUCT: 766 mGy cm TECHNIQUE: Helical tomographic images of the brain were obtained without the use of intravenous contrast. Automated exposure control was also utilized to decrease patient radiation dose. FINDINGS: There is no evidence of evolving large vascular territory infarct. No visualized intra-axial or extra-axial hemorrhage. No mass lesion is identified. Normal size and configuration of the ventricular system. The basal cisterns are symmetric. Posterior fossa structures are unremarkable. The included orbits and their contents are unremarkable. Chronic paranasal sinus disease on the left. The visualized osseous structures and overlying soft tissues of the skull and face are unremarkable. 1. No acute intracranial process. Signed by Dr Javier Berman    Result Date: 4/24/2022  XR CHEST PORTABLE 4/24/2022 4:27 PM HISTORY: ET tube, enteric tube  Technique: Single AP view of the chest COMPARISONS: 4/24/2022 FINDINGS: Endotracheal tube is identified with tip essentially touching the krysta. Enteric tube courses into the stomach with tip curled in the fundus back towards the gastroesophageal junction. The lungs are clear and well expanded. Heart size is stable. Pulmonary vasculature are nondilated. No pleural effusion or pneumothorax. 1. Endotracheal tube is deep, tip essentially touching the krysta. Lungs are clear and well expanded. I would recommend 2-3 cm withdrawal for a more optimal positioning. The results of this exam were discussed with Jenny Young (ICU nursing) on 4/24/2022 at 1640 hours Signed by Dr Ruiz Rodriguez    XR CHEST PORTABLE    Result Date: 4/24/2022  XR CHEST PORTABLE 4/24/2022 1:17 PM HISTORY: ET tube placement  Technique: Single AP view of the chest COMPARISONS: 4/24/2022 FINDINGS: Status post endotracheal intubation. ET tube is in good position. Lungs are well-expanded.  Enteric tube Plan was discussed with critical care nurse.       Jared Gotti MD  04/30/22  9:11 AM

## 2022-05-01 LAB
ALBUMIN SERPL-MCNC: 2.9 G/DL (ref 3.5–5.2)
ALP BLD-CCNC: 95 U/L (ref 35–104)
ALT SERPL-CCNC: 6 U/L (ref 5–33)
ANION GAP SERPL CALCULATED.3IONS-SCNC: 14 MMOL/L (ref 7–19)
AST SERPL-CCNC: 13 U/L (ref 5–32)
BASOPHILS ABSOLUTE: 0.1 K/UL (ref 0–0.2)
BASOPHILS RELATIVE PERCENT: 0.8 % (ref 0–1)
BILIRUB SERPL-MCNC: 0.3 MG/DL (ref 0.2–1.2)
BUN BLDV-MCNC: 17 MG/DL (ref 6–20)
CALCIUM SERPL-MCNC: 8.6 MG/DL (ref 8.6–10)
CHLORIDE BLD-SCNC: 92 MMOL/L (ref 98–111)
CO2: 26 MMOL/L (ref 22–29)
CREAT SERPL-MCNC: 2.4 MG/DL (ref 0.5–0.9)
EOSINOPHILS ABSOLUTE: 0.1 K/UL (ref 0–0.6)
EOSINOPHILS RELATIVE PERCENT: 1.7 % (ref 0–5)
GFR AFRICAN AMERICAN: 26
GFR NON-AFRICAN AMERICAN: 21
GLUCOSE BLD-MCNC: 156 MG/DL (ref 74–109)
GLUCOSE BLD-MCNC: 164 MG/DL (ref 70–99)
GLUCOSE BLD-MCNC: 218 MG/DL (ref 70–99)
GLUCOSE BLD-MCNC: 277 MG/DL (ref 70–99)
GLUCOSE BLD-MCNC: 302 MG/DL (ref 70–99)
HBA1C MFR BLD: 9.4 % (ref 4–6)
HCT VFR BLD CALC: 30.3 % (ref 37–47)
HEMOGLOBIN: 9.4 G/DL (ref 12–16)
IMMATURE GRANULOCYTES #: 0 K/UL
INR BLD: 1.06 (ref 0.88–1.18)
LYMPHOCYTES ABSOLUTE: 0.8 K/UL (ref 1.1–4.5)
LYMPHOCYTES RELATIVE PERCENT: 13.5 % (ref 20–40)
MAGNESIUM: 2.2 MG/DL (ref 1.6–2.6)
MCH RBC QN AUTO: 28.3 PG (ref 27–31)
MCHC RBC AUTO-ENTMCNC: 31 G/DL (ref 33–37)
MCV RBC AUTO: 91.3 FL (ref 81–99)
MONOCYTES ABSOLUTE: 0.7 K/UL (ref 0–0.9)
MONOCYTES RELATIVE PERCENT: 12.2 % (ref 0–10)
NEUTROPHILS ABSOLUTE: 4.3 K/UL (ref 1.5–7.5)
NEUTROPHILS RELATIVE PERCENT: 71.5 % (ref 50–65)
PDW BLD-RTO: 18.3 % (ref 11.5–14.5)
PERFORMED ON: ABNORMAL
PHOSPHORUS: 2.6 MG/DL (ref 2.5–4.5)
PLATELET # BLD: 154 K/UL (ref 130–400)
PMV BLD AUTO: 9.5 FL (ref 9.4–12.3)
POTASSIUM SERPL-SCNC: 3.8 MMOL/L (ref 3.5–5)
PROTHROMBIN TIME: 13.7 SEC (ref 12–14.6)
RBC # BLD: 3.32 M/UL (ref 4.2–5.4)
SODIUM BLD-SCNC: 132 MMOL/L (ref 136–145)
TOTAL PROTEIN: 6.1 G/DL (ref 6.6–8.7)
VITAMIN B-12: 518 PG/ML (ref 211–946)
WBC # BLD: 6.1 K/UL (ref 4.8–10.8)

## 2022-05-01 PROCEDURE — 85670 THROMBIN TIME PLASMA: CPT

## 2022-05-01 PROCEDURE — 82607 VITAMIN B-12: CPT

## 2022-05-01 PROCEDURE — 85306 CLOT INHIBIT PROT S FREE: CPT

## 2022-05-01 PROCEDURE — 85307 ASSAY ACTIVATED PROTEIN C: CPT

## 2022-05-01 PROCEDURE — 85730 THROMBOPLASTIN TIME PARTIAL: CPT

## 2022-05-01 PROCEDURE — 94003 VENT MGMT INPAT SUBQ DAY: CPT

## 2022-05-01 PROCEDURE — 2580000003 HC RX 258: Performed by: INTERNAL MEDICINE

## 2022-05-01 PROCEDURE — 84100 ASSAY OF PHOSPHORUS: CPT

## 2022-05-01 PROCEDURE — 2100000000 HC CCU R&B

## 2022-05-01 PROCEDURE — 6370000000 HC RX 637 (ALT 250 FOR IP): Performed by: PSYCHIATRY & NEUROLOGY

## 2022-05-01 PROCEDURE — 2500000003 HC RX 250 WO HCPCS: Performed by: PSYCHIATRY & NEUROLOGY

## 2022-05-01 PROCEDURE — 85613 RUSSELL VIPER VENOM DILUTED: CPT

## 2022-05-01 PROCEDURE — 80053 COMPREHEN METABOLIC PANEL: CPT

## 2022-05-01 PROCEDURE — 86147 CARDIOLIPIN ANTIBODY EA IG: CPT

## 2022-05-01 PROCEDURE — 81240 F2 GENE: CPT

## 2022-05-01 PROCEDURE — 36415 COLL VENOUS BLD VENIPUNCTURE: CPT

## 2022-05-01 PROCEDURE — 6370000000 HC RX 637 (ALT 250 FOR IP): Performed by: INTERNAL MEDICINE

## 2022-05-01 PROCEDURE — 86431 RHEUMATOID FACTOR QUANT: CPT

## 2022-05-01 PROCEDURE — 82947 ASSAY GLUCOSE BLOOD QUANT: CPT

## 2022-05-01 PROCEDURE — 85300 ANTITHROMBIN III ACTIVITY: CPT

## 2022-05-01 PROCEDURE — 2500000003 HC RX 250 WO HCPCS: Performed by: INTERNAL MEDICINE

## 2022-05-01 PROCEDURE — 99233 SBSQ HOSP IP/OBS HIGH 50: CPT | Performed by: PSYCHIATRY & NEUROLOGY

## 2022-05-01 PROCEDURE — 83090 ASSAY OF HOMOCYSTEINE: CPT

## 2022-05-01 PROCEDURE — 6360000002 HC RX W HCPCS: Performed by: PSYCHIATRY & NEUROLOGY

## 2022-05-01 PROCEDURE — 83735 ASSAY OF MAGNESIUM: CPT

## 2022-05-01 PROCEDURE — 83921 ORGANIC ACID SINGLE QUANT: CPT

## 2022-05-01 PROCEDURE — 6360000002 HC RX W HCPCS: Performed by: INTERNAL MEDICINE

## 2022-05-01 PROCEDURE — 2700000000 HC OXYGEN THERAPY PER DAY

## 2022-05-01 PROCEDURE — 85025 COMPLETE CBC W/AUTO DIFF WBC: CPT

## 2022-05-01 PROCEDURE — 2580000003 HC RX 258: Performed by: PSYCHIATRY & NEUROLOGY

## 2022-05-01 PROCEDURE — 86160 COMPLEMENT ANTIGEN: CPT

## 2022-05-01 PROCEDURE — 81241 F5 GENE: CPT

## 2022-05-01 PROCEDURE — 85610 PROTHROMBIN TIME: CPT

## 2022-05-01 PROCEDURE — 85732 THROMBOPLASTIN TIME PARTIAL: CPT

## 2022-05-01 PROCEDURE — 83516 IMMUNOASSAY NONANTIBODY: CPT

## 2022-05-01 PROCEDURE — 85303 CLOT INHIBIT PROT C ACTIVITY: CPT

## 2022-05-01 PROCEDURE — 85301 ANTITHROMBIN III ANTIGEN: CPT

## 2022-05-01 PROCEDURE — 86038 ANTINUCLEAR ANTIBODIES: CPT

## 2022-05-01 PROCEDURE — 83036 HEMOGLOBIN GLYCOSYLATED A1C: CPT

## 2022-05-01 PROCEDURE — 86146 BETA-2 GLYCOPROTEIN ANTIBODY: CPT

## 2022-05-01 PROCEDURE — 86225 DNA ANTIBODY NATIVE: CPT

## 2022-05-01 RX ORDER — HEPARIN SODIUM 5000 [USP'U]/ML
5000 INJECTION, SOLUTION INTRAVENOUS; SUBCUTANEOUS 3 TIMES DAILY
Status: DISCONTINUED | OUTPATIENT
Start: 2022-05-01 | End: 2022-05-02

## 2022-05-01 RX ADMIN — DEXMEDETOMIDINE HYDROCHLORIDE 0.8 MCG/KG/HR: 4 INJECTION, SOLUTION INTRAVENOUS at 00:55

## 2022-05-01 RX ADMIN — INSULIN GLARGINE 5 UNITS: 100 INJECTION, SOLUTION SUBCUTANEOUS at 21:48

## 2022-05-01 RX ADMIN — WARFARIN SODIUM 7.5 MG: 2.5 TABLET ORAL at 17:23

## 2022-05-01 RX ADMIN — LEVETIRACETAM 500 MG: 5 INJECTION INTRAVENOUS at 12:41

## 2022-05-01 RX ADMIN — ANORECTAL OINTMENT: 15.7; .44; 24; 20.6 OINTMENT TOPICAL at 17:23

## 2022-05-01 RX ADMIN — Medication 10 ML: at 08:22

## 2022-05-01 RX ADMIN — INSULIN LISPRO 6 UNITS: 100 INJECTION, SOLUTION INTRAVENOUS; SUBCUTANEOUS at 08:16

## 2022-05-01 RX ADMIN — DEXMEDETOMIDINE HYDROCHLORIDE 0.4 MCG/KG/HR: 4 INJECTION, SOLUTION INTRAVENOUS at 08:27

## 2022-05-01 RX ADMIN — ANORECTAL OINTMENT: 15.7; .44; 24; 20.6 OINTMENT TOPICAL at 04:00

## 2022-05-01 RX ADMIN — DEXMEDETOMIDINE HYDROCHLORIDE 0.7 MCG/KG/HR: 4 INJECTION, SOLUTION INTRAVENOUS at 23:05

## 2022-05-01 RX ADMIN — CHLORHEXIDINE GLUCONATE 15 ML: 1.2 RINSE ORAL at 08:22

## 2022-05-01 RX ADMIN — ANORECTAL OINTMENT: 15.7; .44; 24; 20.6 OINTMENT TOPICAL at 06:00

## 2022-05-01 RX ADMIN — CHLORHEXIDINE GLUCONATE 15 ML: 1.2 RINSE ORAL at 21:52

## 2022-05-01 RX ADMIN — INSULIN LISPRO 1 UNITS: 100 INJECTION, SOLUTION INTRAVENOUS; SUBCUTANEOUS at 21:41

## 2022-05-01 RX ADMIN — Medication 10 ML: at 21:49

## 2022-05-01 RX ADMIN — SODIUM CHLORIDE, PRESERVATIVE FREE 20 MG: 5 INJECTION INTRAVENOUS at 08:15

## 2022-05-01 RX ADMIN — VANCOMYCIN HYDROCHLORIDE 125 MG: 1 INJECTION, POWDER, LYOPHILIZED, FOR SOLUTION INTRAVENOUS at 06:22

## 2022-05-01 RX ADMIN — ASPIRIN 325 MG: 325 TABLET, COATED ORAL at 08:16

## 2022-05-01 RX ADMIN — INSULIN LISPRO 8 UNITS: 100 INJECTION, SOLUTION INTRAVENOUS; SUBCUTANEOUS at 12:20

## 2022-05-01 RX ADMIN — HEPARIN SODIUM 5000 UNITS: 5000 INJECTION INTRAVENOUS; SUBCUTANEOUS at 21:48

## 2022-05-01 RX ADMIN — VANCOMYCIN HYDROCHLORIDE 125 MG: 1 INJECTION, POWDER, LYOPHILIZED, FOR SOLUTION INTRAVENOUS at 12:19

## 2022-05-01 RX ADMIN — HEPARIN SODIUM 5000 UNITS: 5000 INJECTION INTRAVENOUS; SUBCUTANEOUS at 08:16

## 2022-05-01 RX ADMIN — DEXTROSE MONOHYDRATE 4 MG/HR: 5 INJECTION, SOLUTION INTRAVENOUS at 03:25

## 2022-05-01 RX ADMIN — INSULIN LISPRO 4 UNITS: 100 INJECTION, SOLUTION INTRAVENOUS; SUBCUTANEOUS at 17:24

## 2022-05-01 RX ADMIN — VANCOMYCIN HYDROCHLORIDE 125 MG: 1 INJECTION, POWDER, LYOPHILIZED, FOR SOLUTION INTRAVENOUS at 17:23

## 2022-05-01 RX ADMIN — LEVETIRACETAM 500 MG: 5 INJECTION INTRAVENOUS at 00:55

## 2022-05-01 RX ADMIN — VANCOMYCIN HYDROCHLORIDE 125 MG: 1 INJECTION, POWDER, LYOPHILIZED, FOR SOLUTION INTRAVENOUS at 00:06

## 2022-05-01 RX ADMIN — HEPARIN SODIUM 5000 UNITS: 5000 INJECTION INTRAVENOUS; SUBCUTANEOUS at 15:08

## 2022-05-01 ASSESSMENT — PULMONARY FUNCTION TESTS
PIF_VALUE: 23
PIF_VALUE: 24
PIF_VALUE: 20
PIF_VALUE: 22
PIF_VALUE: 23
PIF_VALUE: 24
PIF_VALUE: 20
PIF_VALUE: 20
PIF_VALUE: 27
PIF_VALUE: 24
PIF_VALUE: 21
PIF_VALUE: 23
PIF_VALUE: 21
PIF_VALUE: 23
PIF_VALUE: 21
PIF_VALUE: 28
PIF_VALUE: 22
PIF_VALUE: 23
PIF_VALUE: 10
PIF_VALUE: 21

## 2022-05-01 ASSESSMENT — PAIN SCALES - GENERAL
PAINLEVEL_OUTOF10: 0
PAINLEVEL_OUTOF10: 0

## 2022-05-01 NOTE — PROGRESS NOTES
Patient weaned off Precedex, attempted CPAP with RT at bedside, patient did not wake up enough to breath on own.  Will try again later     Electronically signed by Purnima Jones RN on 5/1/2022 at 9:40 AM

## 2022-05-01 NOTE — PROGRESS NOTES
Clinical Pharmacy Note    Carloz Kemp is a 47 y.o. female for whom pharmacy has been asked to manage warfarin therapy. Reason for Admission: Acute left MCA infarct    Consulting Physician: Dr Carlos Alberto Peña  Warfarin dose prior to admission: new start  Warfarin indication: stroke/dvt prophylaxis  Target INR range: 2-3   Outpatient warfarin provider: new start    Past Medical History:   Diagnosis Date    Acute ischemic stroke ([facility] 4/30/2022    Arthritis     Chronic kidney disease, stage 5, kidney failure (HCC)     Closed fracture of right shoulder girdle, with routine healing, subsequent encounter     DM (diabetes mellitus) type II controlled with renal manifestation ([facility] 06/1993    Gastroparesis     GERD (gastroesophageal reflux disease)     Hemodialysis patient ([facility]     dialysis on tues, thur, and sat at [facility]    Hypertension     Hypothyroid     Nasal congestion     recent    Neuropathy     Noncompliance with medications     Palliative care patient 10/08/2019    Restless leg syndrome                 Recent Labs     05/01/22  1231   INR 1.06     Recent Labs     04/29/22  0846 04/30/22  0153 05/01/22  0226   HGB 9.6* 9.7* 9.4*   HCT 31.7* 31.5* 30.3*   * 132 154       Current warfarin drug-drug interactions: N/A        Date INR Warfarin Dose   05/01/22 1.06 7.5 mg                                   Give warfarin 7.5 mg PO x 1 dose. Daily PT/INR until stable within therapeutic range. Thank you for the consult.      Electronically signed by Paola Cagle [facility] on 5/1/2022 at 1:39 PM

## 2022-05-01 NOTE — PROGRESS NOTES
[facility] Neurology Progress Note      Patient:   Maria Elena Vargas  MR#:    [id number]   Room:    0702/702-01   YOB: 1970  Date of Progress Note: 5/1/2022  Time of Note                           9:36 AM  Consulting Physician:  Ravindra Quinn DO  Attending Physician:  Laya Klein MD      INTERVAL HISTORY: No acute issues overnight. REVIEW OF SYSTEMS:  Unable to obtain     PHYSICAL EXAM:    Constitutional    BP (!) 147/80   Pulse 72   Temp 98 °F (36.7 °C) (Temporal)   Resp 16   Ht 5' 6\" (1.676 m)   Wt 131 lb 12.8 oz (59.8 kg)   SpO2 100%   BMI 21.27 kg/m²     Constitutional  well developed, well nourished. Eyes  conjunctiva normal.   Ear, nose, throat - No scars, masses, or lesions over external nose or ears, no atrophy of tongue  Neck-symmetric, no masses noted, no jugular vein distension  Respiration- chest wall appears symmetric, good expansion,   normal effort without use of accessory muscles  Musculoskeletal  no significant wasting of muscles noted, no bony deformities  Extremities-no clubbing, cyanosis or edema  Skin  warm, dry, and intact. No rash, erythema, or pallor. Psychiatric  mood, affect, and behavior unable to assess     Neurological exam  Eyes open occasionally to stimulation, intubated on Precedex  Conjugate eye movement  Spontaneous movement in the left hand noted, no movement of the right arm left arm  restrained  No tremors in hands or head noted         LABS/IMAGING:    CT Head WO Contrast    Result Date: 4/24/2022  CT HEAD WO CONTRAST 4/24/2022 2:01 PM HISTORY: Altered mental status COMPARISON: CT scan dated 11/18/2021 DOSE LENGTH PRODUCT: 766 mGy cm TECHNIQUE: Helical tomographic images of the brain were obtained without the use of intravenous contrast. Automated exposure control was also utilized to decrease patient radiation dose. FINDINGS: There is no evidence of evolving large vascular territory infarct. No visualized intra-axial or extra-axial hemorrhage. No mass lesion is identified. Normal size and configuration of the ventricular system. The basal cisterns are symmetric. Posterior fossa structures are unremarkable. The included orbits and their contents are unremarkable. Chronic paranasal sinus disease on the left. The visualized osseous structures and overlying soft tissues of the skull and face are unremarkable. 1. No acute intracranial process. Signed by Dr Hugh Bell    Result Date: 4/24/2022  XR CHEST PORTABLE 4/24/2022 4:27 PM HISTORY: ET tube, enteric tube  Technique: Single AP view of the chest COMPARISONS: 4/24/2022 FINDINGS: Endotracheal tube is identified with tip essentially touching the krysta. Enteric tube courses into the stomach with tip curled in the fundus back towards the gastroesophageal junction. The lungs are clear and well expanded. Heart size is stable. Pulmonary vasculature are nondilated. No pleural effusion or pneumothorax. 1. Endotracheal tube is deep, tip essentially touching the krysta. Lungs are clear and well expanded. I would recommend 2-3 cm withdrawal for a more optimal positioning. The results of this exam were discussed with Marcelina Maldonado (ICU nursing) on 4/24/2022 at 1640 hours Signed by Dr Kevin Lopez    XR CHEST PORTABLE    Result Date: 4/24/2022  XR CHEST PORTABLE 4/24/2022 1:17 PM HISTORY: ET tube placement  Technique: Single AP view of the chest COMPARISONS: 4/24/2022 FINDINGS: Status post endotracheal intubation. ET tube is in good position. Lungs are well-expanded. Enteric tube curls on itself in the midesophagus and then extends back up into the throat. Heart size is stable. Pulmonary vasculature are nondilated. 1. ET tube is in good position. Lungs are clear and well expanded. 2. Enteric tube curls on itself in the mid esophagus before extending back up into the throat. This will need repositioned.  The results of this exam were discussed with Dr. Boston Rodriguez on 4/24/2022 at 1329 hours Signed by Dr Aminata Mathis    Result Date: 4/24/2022  XR CHEST PORTABLE 4/24/2022 12:34 PM HISTORY: Altered mental status  Technique: Single AP view of the chest COMPARISONS: Chest exam dated 4/1/2022 FINDINGS: No lung consolidation. No pleural effusion or pneumothorax. Cardiomediastinal silhouette and pulmonary vasculature are unremarkable. No acute bony abnormality. Left subclavian region vascular stent with additional stent projecting over the left humerus. No acute cardiopulmonary process.  Signed by Dr Fannie Burton      Lab Results   Component Value Date    WBC 6.1 05/01/2022    HGB 9.4 (L) 05/01/2022    HCT 30.3 (L) 05/01/2022    MCV 91.3 05/01/2022     05/01/2022     Lab Results   Component Value Date     (L) 05/01/2022    K 3.8 05/01/2022    CL 92 (L) 05/01/2022    CO2 26 05/01/2022    BUN 17 05/01/2022    CREATININE 2.4 (H) 05/01/2022    GLUCOSE 156 (H) 05/01/2022    CALCIUM 8.6 05/01/2022    PROT 6.1 (L) 05/01/2022    LABALBU 2.9 (L) 05/01/2022    BILITOT 0.3 05/01/2022    ALKPHOS 95 05/01/2022    AST 13 05/01/2022    ALT 6 05/01/2022    LABGLOM 21 (A) 05/01/2022    GFRAA 26 (L) 05/01/2022     Lab Results   Component Value Date    INR 0.96 01/01/2022    INR 0.97 11/18/2021    INR 1.06 05/29/2021    PROTIME 13.0 01/01/2022    PROTIME 13.1 11/18/2021    PROTIME 13.7 05/29/2021     ECHO 2D WO COLOR DOPPLER COMPLETE [[id number]]    Collected: 04/29/22 1531    Updated: 04/30/22 1102    Narrative:     Transthoracic Echocardiography Report (TTE)      Demographics        Patient Name [facility] Date of Study          04/29/2022        MRN           [id number]       Gender                 Female        Date of Birth 1970   Room Number            [id number]        Age           52 year(s)        Height:       66 inches    Referring Physician    Elmer Campbell        Weight:       144 pounds   Sonographer            Jessie Madera RDCS        BSA:          1.74 m^2     Interpreting Physician Rosalba Macias MD        BMI:          23.24 kg/m^2       Procedure     Type of Study        TTE procedure:ECHO NO CONTRAST WITH DOP/COLR.       Study Location: Portable   Technical Quality: Adequate visualization     Patient Status: Inpatient     Contrast Medium: Bubble Study. Rhythm: Within normal limits HR: 57 bpm BP: 178/69 mmHg     Indications:Stroke.      Conclusions        Summary    Mitral valve leaflets are mildly thickened with preserved leaflet    mobility.    Mild aortic stenosis.    Mean gradient 14 mm hg    Tricuspid valve is structurally normal.    Mild tricuspid regurgitation.    Moderately dilated left atrium.    Normal intact intra-atrial septum was noted with no evidence of    significant intra-atrial communications by color flow Doppler or by    agitated saline study.    Normal left ventricular size with preserved LV function and an estimated    ejection fraction of approximately 55-60%.    Moderate concentric left ventricular hypertrophy.    No regional wall motion abnormalities.    Grade II diastolic dysfunction        Signature        ----------------------------------------------------------------    Electronically signed by Rosalba Macias MD(Interpreting    physician) on 04/30/2022 11:02 AM    ----------------------------------------------------------------          RECORD REVIEW:   Previous medical records, medications were reviewed at today's visit. Nursing/physician notes, imaging, labs and vitals reviewed. ASSESSMENT:  47 y.o. admitted after an episode of unresponsiveness followed by generalized tonic-clonic event in the emergency department. She had profoundly elevated glucose, possible hyperglycemic coma, hyponatremia, underlying UTI,  end-stage renal disease with a history of noncompliance noted as well. Head CT nothing acute.  No seizure noted on cEEG    Acute ischemic stroke-extensive stroke throughout much of the left MCA distribution, left occipital lobe and possibility small acute focus in the right posterior periventricular region most suggestive of a cardioembolic event       Continue addressing blood glucose, hyponatremia, UTI, ESRD nephrology following  Wean precedex, extubate when able     Carotid ultrasound less than 50% bilaterally  Echocardiogram normal left ventricular size and function with ejection fraction 55 to 60%/moderately dilated left atrium/no PFO  Check hypercoagulable labs  Start Coumadin for cardioembolic stroke-pharmacy to dose Coumadingoal INR 2-3      C. difficileon vancomycin  Respiratory failurewean off vent as able  Hypertension-Cardene dripokay to keep blood pressure [phone] hypotension  DVT prophylaxisHeparin subcu  GIPepcid  Seizurecontinue Keppra  End stage renal disease/bowel disturbanceson dialysis  Diabetesmonitor blood sugar    Supportive care      Please feel free to call with any questions. [phone] (cell phone).       Rachele Wells MD  Board Certified Neurology

## 2022-05-01 NOTE — PROGRESS NOTES
Hospitalist Progress Note  [facility]     Patient: Maykel Benoit  : 1970  MRN: [id number]  Code Status: Full Code    Hospital Day: 7   Date of Service: 2022    Subjective:   Patient seen and examined. Sedation vacation. SBT today. Past Medical History:   Diagnosis Date    Acute ischemic stroke ([facility] 2022    Arthritis     Chronic kidney disease, stage 5, kidney failure (HCC)     Closed fracture of right shoulder girdle, with routine healing, subsequent encounter     DM (diabetes mellitus) type II controlled with renal manifestation ([facility] 1993    Gastroparesis     GERD (gastroesophageal reflux disease)     Hemodialysis patient ([facility]     dialysis on tues, thur, and sat at [facility]    Hypertension     Hypothyroid     Nasal congestion     recent    Neuropathy     Noncompliance with medications     Palliative care patient 10/08/2019    Restless leg syndrome        Past Surgical History:   Procedure Laterality Date    BREAST SURGERY Left     infected milk gland removed    BREAST SURGERY Right 2021    WEDGE EXCISION RIGHT BREAST DUCT WITH LACRIMAL DUCT PROBE, PEC BLOCK performed by Sindhu Nichole MD at [address] [place] [address], [facility]          COLONOSCOPY Left 2020    Dr Isela Burrell hepatic flexure-Severe tortuosity with severe spasming, 1 yr recall    DIALYSIS FISTULA CREATION Left 2019    REVISION LEFT UPPER EXTREMITY AV ACCESS WITH LEFT BRACHIAL ARTERY TO LEFT AXILLARY VEIN WITH  INTERPOSITIONAL ARTEGRAFT   performed by Gilmer Frias MD at [address]  2012    [address] Right 2019    INSERTION OF RIGHT INTERNAL JUGULAR HEMODIALYSIS CATHETER performed by Gilmer Frias MD at [address] [facility]  2018    1.  Moderately increased stainable iron identified by special stain in Kupffer cells and occasional hepatocytes.  Negative for evidence of significant portal or lobular inflammation. Negative for evidence of significant fibrosis    TN COLONOSCOPY W/BIOPSY SINGLE/MULTIPLE N/A 10/20/2017    Dr Venkata Goode prep-Tubular AP (-) dysplasia x 2--3 yr recall    TN EGD TRANSORAL BIOPSY SINGLE/MULTIPLE N/A 2016    Dr Brown Cooks EGD TRANSORAL BIOPSY SINGLE/MULTIPLE N/A 10/20/2017    Dr Jaspal Martin (-)   [address] VASCULAR SURGERY Left 2016    fistula by Dr Denny Cat at [address]  10/02/2017    [facility]. Left upper fistulograms/venograms, balloon angioplasty cephalic vein arch [id number] conquest.    VASCULAR SURGERY  2018    FISTULOGRAM    VASCULAR SURGERY  10/29/2018    [facility]JS. Left upper fistulograms/venograms    VASCULAR SURGERY  2018    [facility]JS. Arch aortogram,left upper arteriograms.  VASCULAR SURGERY  2019    SJS. Removal of tunneled dialyis catheter right internal jugular vein.  VASCULAR SURGERY  2019    [facility]. Left upper extremity fistulogram including venography to the superior vena cava. Balloon angioplasty left subclavian vein with 10mm x 40mm conquest balloon. Balloon angioplasty mid/proximal upper arm cephalic vein with 40IT x 40mm conquest balloon. Venograms after balloon angioplasty. Stent left mid/proximal upper arm cephalic vein stenosis fluency 10mm x 60mm self-expanding covered stent. Balloon     VASCULAR SURGERY  2019    cont angioplasty stent with 10mm x 40mm conquest balloon. Completion venograms left upper extremity.        Family History   Problem Relation Age of Onset    Colon Cancer Mother          at 63    Colon Polyps Mother     Lung Cancer Father          at 79    Colon Polyps Father     Stomach Cancer Sister     Breast Cancer Sister 27    Depression Daughter 25        committed suicide    Liver Cancer Neg Hx     Liver Disease Neg Hx     Esophageal Cancer Neg Hx     Rectal Cancer Neg Hx        Social History     Socioeconomic History    Marital status: Single     Spouse name: Not on file  Number of children: 2    Years of education: Not on file    Highest education level: Not on file   Occupational History    Not on file   Tobacco Use    Smoking status: Current Every Day Smoker     Packs/day: 0.50     Years: 33.00     Pack years: 16.50     Types: Cigarettes    Smokeless tobacco: Never Used    Tobacco comment: NOW at 1/4 PD, started at age 24 and has averaged 2 PPD   Vaping Use    Vaping Use: Never used   Substance and Sexual Activity    Alcohol use: No    Drug use: Not Currently     Types: Marijuana Delia Winslow)    Sexual activity: Not Currently     Partners: Male   Other Topics Concern    Not on file   Social History Narrative    Not on file     Social Determinants of Health     Financial Resource Strain:     Difficulty of Paying Living Expenses: Not on file   Food Insecurity:     Worried About Running Out of Food in the Last Year: Not on file    Kendy of Food in the Last Year: Not on file   Transportation Needs:     Lack of Transportation (Medical): Not on file    Lack of Transportation (Non-Medical):  Not on file   Physical Activity:     Days of Exercise per Week: Not on file    Minutes of Exercise per Session: Not on file   Stress:     Feeling of Stress : Not on file   Social Connections:     Frequency of Communication with Friends and Family: Not on file    Frequency of Social Gatherings with Friends and Family: Not on file    Attends Jewish Services: Not on file    Active Member of [address] [place] [address] or Organizations: Not on file    Attends Club or Organization Meetings: Not on file    Marital Status: Not on file   Intimate Partner Violence:     Fear of Current or Ex-Partner: Not on file    Emotionally Abused: Not on file    Physically Abused: Not on file    Sexually Abused: Not on file   Housing Stability:     Unable to Pay for Housing in the Last Year: Not on file    Number of [place] in the Last Year: Not on file    Unstable Housing in the Last Year: Not on file Current Facility-Administered Medications   Medication Dose Route Frequency Provider Last Rate Last Admin    warfarin (COUMADIN) tablet 7.5 mg  7.5 mg Oral Daily Neville Sullivan MD        heparin (porcine) injection 5,000 Units  5,000 Units SubCUTAneous TID Neville Sullivan MD        insulin glargine (LANTUS) injection vial 5 Units  5 Units SubCUTAneous Nightly Andre Schrader MD   5 Units at 04/30/22 1744    niCARdipine (CARDENE) 25 mg in dextrose 5 % 250 mL infusion  2.5-15 mg/hr IntraVENous Continuous Neville Sullivan MD 10 mL/hr at 05/01/22 1100 1 mg/hr at 05/01/22 1100    dexmedetomidine (PRECEDEX) 400 mcg in sodium chloride 0.9 % 100 mL infusion  0.1-1.5 mcg/kg/hr IntraVENous Continuous Fannie Fairford, DO   Stopped at 05/01/22 0912    menthol-zinc oxide (CALMOSEPTINE) 0.44-20.6 % ointment   Topical BID Andre Schrader MD   Given at 05/01/22 0600    sodium phosphate 10.5 mmol in sodium chloride 0.9 % 250 mL IVPB  0.16 mmol/kg IntraVENous PRN Patience Walter, DO        Or    sodium phosphate 21 mmol in sodium chloride 0.9 % 250 mL IVPB  0.32 mmol/kg IntraVENous PRN Patience Walter, DO        insulin lispro (HUMALOG) injection vial 0-12 Units  0-12 Units SubCUTAneous TID WC Fannie Fairford, DO   8 Units at 05/01/22 1220    insulin lispro (HUMALOG) injection vial 0-6 Units  0-6 Units SubCUTAneous Nightly Fannie Fairford, DO   2 Units at 04/30/22 2126    vancomycin (VANCOCIN) oral solution 125 mg  125 mg Oral 4 times per day Fannie Fairford, DO   125 mg at 05/01/22 1219    hydrALAZINE (APRESOLINE) tablet 100 mg  100 mg Oral 3 times per day Patience Walter, DO   100 mg at 04/28/22 2014    propofol injection  5-50 mcg/kg/min IntraVENous Once Fannie Fairford, DO 11.2 mL/hr at 04/24/22 1505 25 mcg/kg/min at 04/24/22 1505    glucagon (rDNA) injection 1 mg  1 mg IntraMUSCular PRN Fannie Fairford, DO        dextrose 5 % solution  100 mL/hr IntraVENous PRN Fannie Fairford, DO  glucose chewable tablet 16 g  4 tablet Oral PRN Raydell Fought, DO        dextrose bolus (hypoglycemia) 10% 125 mL  125 mL IntraVENous PRN Raydell Fought, DO   Stopped at 04/25/22 8661    Or    dextrose bolus (hypoglycemia) 10% 250 mL  250 mL IntraVENous PRN Raydell Fought, DO        sodium chloride flush 0.9 % injection 5-40 mL  5-40 mL IntraVENous 2 times per day Raydell Fought, DO   10 mL at 05/01/22 0540    sodium chloride flush 0.9 % injection 5-40 mL  5-40 mL IntraVENous PRN Raydell Fought, DO        0.9 % sodium chloride infusion   IntraVENous PRN Raydell Fought, DO        LORazepam (ATIVAN) injection 1 mg  1 mg IntraVENous Q5 Min PRN Raydell Fought, DO        ondansetron (ZOFRAN-ODT) disintegrating tablet 4 mg  4 mg Oral Q8H PRN Raydell Fought, DO        Or    ondansetron [facility]) injection 4 mg  4 mg IntraVENous Q6H PRN Raydell Fought, DO        polyethylene glycol (GLYCOLAX) packet 17 g  17 g Oral Daily PRN Raydell Fought, DO        acetaminophen (TYLENOL) tablet 650 mg  650 mg Oral Q6H PRN Raydell Fought, DO   650 mg at 04/25/22 7445    Or    acetaminophen (TYLENOL) suppository 650 mg  650 mg Rectal Q6H PRN Raydell Fought, DO        levETIRAcetam (KEPPRA) 500 mg/100 mL IVPB  500 mg IntraVENous Q12H Raydell Fought, DO   Stopped at 05/01/22 0110    chlorhexidine (PERIDEX) 0.12 % solution 15 mL  15 mL Mouth/Throat BID Raydell Fought, DO   15 mL at 05/01/22 4628    famotidine (PEPCID) 20 mg in sodium chloride (PF) 10 mL injection  20 mg IntraVENous Daily Raydell Fought, DO   20 mg at 05/01/22 0815    propofol injection  5-50 mcg/kg/min IntraVENous Continuous Raydell Fought, DO   Stopped at 04/28/22 5056    magic butt cream   Topical Q4H PRN Raydell Fought, DO   Given at 04/25/22 0028    magnesium sulfate 1000 mg in dextrose 5% 100 mL IVPB  1,000 mg IntraVENous PRN Patience Walter, DO        potassium bicarb-citric acid (EFFER-K) effervescent tablet 40 mEq  40 mEq Oral PRN Patience Walter, DO        Or    potassium chloride 10 mEq/100 mL IVPB (Peripheral Line)  10 mEq IntraVENous PRN Patience Walter, DO   Stopped at 04/25/22 0936         niCARdipine 1 mg/hr (05/01/22 1100)    dexmedetomidine HCl in NaCl Stopped (05/01/22 0912)    dextrose      sodium chloride      propofol Stopped (04/28/22 0323)        Objective:   BP (!) 189/73   Pulse 89   Temp 98 °F (36.7 °C) (Temporal)   Resp 16   Ht 5' 6\" (1.676 m)   Wt 131 lb 12.8 oz (59.8 kg)   SpO2 100%   BMI 21.27 kg/m²     General: no acute distress  HEENT: normocephalic, atraumatic  Neck: supple, symmetrical, trachea midline   Lungs: clear to auscultation bilaterally   Cardiovascular: s1 and s2 normal  Abdomen: soft, positive bowel sounds  Extremities: no cyanosis   Neuro: intubated, sedation vacation    Recent Labs     04/29/22  0846 04/30/22 0153 05/01/22 0226   WBC 6.5 7.8 6.1   RBC 3.35* 3.40* 3.32*   HGB 9.6* 9.7* 9.4*   HCT 31.7* 31.5* 30.3*   MCV 94.6 92.6 91.3   MCH 28.7 28.5 28.3   MCHC 30.3* 30.8* 31.0*   * 132 154     Recent Labs     04/29/22  0722 04/30/22 0153 05/01/22 0226   * 127* 132*   K 3.9 3.8 3.8   ANIONGAP 21* 21* 14   CL 90* 88* 92*   CO2 17* 18* 26   BUN 19 26* 17   CREATININE 2.7* 3.3* 2.4*   GLUCOSE 305* 264* 156*   CALCIUM 8.2* 8.9 8.6     Recent Labs     04/29/22  0722 04/30/22 0153 05/01/22 0226   MG 2.3 2.4 2.2   PHOS 3.6 4.1 2.6     Recent Labs     04/29/22  0722 04/30/22  0153 05/01/22  0226   AST 14 13 13   ALT 7 7 6   BILITOT 0.4 0.4 0.3   ALKPHOS 96 106* 95     No results for input(s): PH, PO2, PCO2, HCO3, BE, O2SAT in the last 72 hours. No results for input(s): TROPONINI in the last 72 hours. No results for input(s): INR in the last 72 hours. No results for input(s): LACTA in the last 72 hours.       Intake/Output Summary (Last 24 hours) at 5/1/2022 1232  Last data filed at 5/1/2022 1100  Gross per 24 hour   Intake 2524.22 ml   Output 198 ml   Net 2326.22 ml       VL DUP CAROTID BILATERAL    Result Date: 4/29/2022  Vascular Carotid Procedure  Demographics   Patient Name    Lynette Whaley Age                   47   Patient Number  [id number]       Gender                Female   Visit Number    [id number]    Interpreting          Maikol Díaz                               Physician   Date of Birth   1970   Referring Physician   Jamal Ocasio   Accession       [id number]   [address], [place], [address]  Number  Procedure Type of Study:   Cerebral:Carotid, VL CAROTID BILATERAL. Indications for Study:Stroke. Risk Factors   - The patient's risk factor(s) include: diabetes mellitus and arterial     hypertension.   - Current - Every day. Allergies   - Penicillins.   - Stadol.   - Other allergy:(Lamisil Advanced). Technical Quality:Limited visualization due to patient on ventilator. Impression   There is mixed plaque visualized in the bilateral internal carotid  artery(ies). There is less than 50% stenosis in the right internal carotid artery. There is less than 50% stenosis of the left internal carotid artery. There is normal antegrade flow in the bilateral vertebral artery(ies). right side is very limited due to positioning, her chin is resting on her  right clavicle and she is vented. Signature   ----------------------------------------------------------------  Electronically signed by oBra Álvarez  physician) on 04/29/2022 03:20 PM  ----------------------------------------------------------------  Blood Pressure:Right arm 178/69 mmHg. Velocities are measured in cm/s ; Diameters are measured in mm Carotid Right Measurements +------------+-------+-------+--------+-------+------------+---------------+ ! Location    ! PSV    ! EDV    ! Angle   ! RI     !%Stenosis   ! Tortuosity     ! +------------+-------+-------+--------+-------+------------+---------------+ ! Prox CCA !47.2   !       !60      !       !            !               ! +------------+-------+-------+--------+-------+------------+---------------+ ! Mid CCA     !47.2   !       !60      !       !            !               ! +------------+-------+-------+--------+-------+------------+---------------+ ! Prox ICA    !41     !7.46   !60      !0.82   !            !               ! +------------+-------+-------+--------+-------+------------+---------------+ ! Mid ICA     !62.7   !11.2   !60      !0.82   !            !               ! +------------+-------+-------+--------+-------+------------+---------------+ ! Dist ICA    !75.2   !13.7   !60      !0.82   !            !               ! +------------+-------+-------+--------+-------+------------+---------------+ ! Prox ECA    !147    !       !61      !       !            !               ! +------------+-------+-------+--------+-------+------------+---------------+ ! Vertebral   !47.2   !13.7   !60      !0.71   !            !               ! +------------+-------+-------+--------+-------+------------+---------------+   - There is antegrade vertebral flow noted on the right side. - Additional Measurements:ICAPSV/CCAPSV 1.59. Carotid Left Measurements +------------+-------+-------+--------+-------+------------+---------------+ ! Location    ! PSV    ! EDV    ! Angle   ! RI     !%Stenosis   ! Tortuosity     ! +------------+-------+-------+--------+-------+------------+---------------+ ! Prox CCA    !81.4   !9.94   !60      !0.88   !            !               ! +------------+-------+-------+--------+-------+------------+---------------+ ! Mid CCA     !70.2   !7.46   !60      !0.89   !            !               ! +------------+-------+-------+--------+-------+------------+---------------+ ! Prox ICA    !69.4   !11.9   !60      !0.83   !            !               ! +------------+-------+-------+--------+-------+------------+---------------+ ! Mid ICA     !80.6   !19.6   !60      !0.76   ! !               ! +------------+-------+-------+--------+-------+------------+---------------+ ! Dist ICA    !70.3   !12.5   !50      !0.82   !            !               ! +------------+-------+-------+--------+-------+------------+---------------+ ! Prox ECA    !113    !       !61      !       !            !               ! +------------+-------+-------+--------+-------+------------+---------------+ ! Vertebral   !38.8   !       !60      !       !            !               ! +------------+-------+-------+--------+-------+------------+---------------+   - There is antegrade vertebral flow noted on the left side. - Additional Measurements:ICAPSV/CCAPSV 0.99. ICAEDV/CCAEDV 1.97. MRI BRAIN WO CONTRAST    Result Date: 4/29/2022  Examination. MRI BRAIN WO CONTRAST 4/29/2022 11:43 AM History: Seizure activity. The multiplanar, multisequence MR imaging of the brain is performed without contrast enhancement. There is no previous similar study for comparison. The correlation made with CT scan of the head dated 4/24/2022. The diffusion-weighted imaging sequence including the ADC map show an area of acute infarction involving the left posterior frontal, parietal and basal occipital lobe, the left middle cerebral artery territory. A small linear focus of T2 signal in the right posterior paraventricular white matter has no corresponding ADC map changes and may represent a subacute or chronic process. The targeted images of the mesial temporal lobe show moderate symmetrical mesial temporal atrophy without presence of any abnormal signal. This may be a process of general volume loss/atrophy. Moderately dilated ventricles, basal cisterns and the cortical sulci suggestive chronic volume loss/atrophy. The orbits are suboptimally evaluated. No obvious abnormality.  Limited visualized optic nerve, optic chiasma and optic tracts are normal and symmetrical. The pituitary gland appears normal. The corpus callosum, the brainstem and cerebellum are normal. The FLAIR sequence images show significant periventricular T2 signal changes representing chronic white matter ischemia and transependymal CSF flow. The gradient recalled imaging sequence show normal flow void in the visualized intracranial vessels, particularly normal flow in the limited visualized left middle cerebral artery. There is mucosal thickening involving the ethmoid sinuses. The bilateral mastoid effusion. 1. Acute left middle cerebral territory infarct. 2. Chronic ischemic and atrophic changes. 3. The symmetrical mesial temporal atrophy is probably a part of the generalized volume loss. 4. Ethmoid sinusitis. Bilateral mastoid effusion. The above report was immediately called to the nurse in charge of the patient in CCU. Signed by Dr Nereyda Alan    ECHO 2D Carly Johnson    Result Date: 4/30/2022  Transthoracic Echocardiography Report (TTE)  Demographics   Patient Name  [facility] Date of Study          04/29/2022   MRN           [id number]       Gender                 Female   Date of Birth 1970   Room Number            [facility]-0702   Age           47 year(s)   Height:       66 inches    Referring Physician    Elda Lopez   Weight:       144 pounds   Sonographer            Jessie Madera RDCS   BSA:          1.74 m^2     Interpreting Physician Maldonado Chin MD   BMI:          23.24 kg/m^2  Procedure Type of Study   TTE procedure:ECHO NO CONTRAST WITH DOP/COLR. Study Location: Portable Technical Quality: Adequate visualization Patient Status: Inpatient Contrast Medium: Bubble Study. Rhythm: Within normal limits HR: 57 bpm BP: 178/69 mmHg Indications:Stroke. Conclusions   Summary  Mitral valve leaflets are mildly thickened with preserved leaflet  mobility. Mild aortic stenosis. Mean gradient 14 mm hg  Tricuspid valve is structurally normal.  Mild tricuspid regurgitation. Moderately dilated left atrium.   Normal intact intra-atrial septum was noted with no evidence of  significant intra-atrial communications by color flow Doppler or by  agitated saline study. Normal left ventricular size with preserved LV function and an estimated  ejection fraction of approximately 55-60%. Moderate concentric left ventricular hypertrophy. No regional wall motion abnormalities. Grade II diastolic dysfunction   Signature   ----------------------------------------------------------------  Electronically signed by Mino Montanez MD(Interpreting  physician) on 04/30/2022 11:02 AM  ----------------------------------------------------------------   Findings   Mitral Valve  Mitral valve leaflets are mildly thickened with preserved leaflet  mobility. Aortic Valve  Mild aortic stenosis. Mean gradient 14 mm hg   Tricuspid Valve  Tricuspid valve is structurally normal.  Mild tricuspid regurgitation. Pulmonic Valve  The pulmonic valve was not well visualized . Left Atrium  Moderately dilated left atrium. Normal intact intra-atrial septum was noted with no evidence of  significant intra-atrial communications by color flow Doppler or by  agitated saline study. Left Ventricle  Normal left ventricular size with preserved LV function and an estimated  ejection fraction of approximately 55-60%. Moderate concentric left ventricular hypertrophy. No regional wall motion abnormalities. Grade II diastolic dysfunction   Right Atrium  Normal right atrial dimension with no evidence of thrombus or mass noted. Right Ventricle  Normal right ventricular size with preserved RV function. Pericardial Effusion  No evidence of significant pericardial effusion is noted. Pleural Effusion  No evidence of pleural effusion. Allergies   - Penicillins.   - Stadol.   - Other allergy:(Lamisil Advanced). 2D Measurements and Calculations(cm)   LVIDd: 4.4 cm                       LVIDs: 3.14 cm  IVSd: 1.41 cm  LVPWd: 1.43 cm                      AO Root Dimension: 2.4 cm  Rt. Vent.  Dimension: 2.64 cm LA Dimension: 3.8 cm  % Ejection Fraction: 55 %           LA Area: 24.3 cm^2  LA Volume: 86.1 ml                  LV Systolic dimension: 1.12IV  LA Volume Index: 49 ml/m^2          LV PW diastolic: 5.91VF  LV dimension: 4.4 cm                LVOT diameter: 2 cm                                      RV Diastolic dimension: 0.41LK  LVEDV: 115 ml                       LVEDVI: 66 ml/m^2  LVESV:36.3 ml                       LVESVI: 21 ml/m^2  Cardic Output (CO): 5.53l/min  Doppler Measurements and Calculations   AV Peak Velocity:257 cm/s              MV Peak E-Wave: 82.3 cm/s  AV Mean Velocity:178 cm/s              MV Peak A-Wave: 76.7 cm/s  AV Peak Gradient: 26.42 mmHg           MV E/A Ratio: 1.07 %  AV Mean Gradient: 14 mmHg              MV Mean velocity: 64.8cm/s  AV Area (Continuity):1.85 cm^2         MV Peak Gradient: 2.71 mmHg  AV VTI:52.4 cm/s                       MV Mean gradient: 2 mmHg  TR Velocity:207 cm/s  TR Gradient:17.14 mmHg   MV E' septal velocity: 5.66cm/s  MV E' lateral velocity:5.66 cm/s       Assessment and Plan:   Acute left MCA infarct  Per MRI brain 4/29/2022  Neurology following  Neurochecks  Meds per neurology  Coumadin initiated per neurology for cardioembolic stroke     Seizure  Neurology following  AEDs per neurology  Seizure precautions     Ventilator dependent acute respiratory failure  Secondary to above processes  Titrate minute ventilation for pH 7.35  Follow ABG/CXR  Follow plateau pressure  Sedation vacation  Repeat SBT today     ESRD on HD  Renal following     Medical noncompliance       C. difficile infection  Oral vancomycin     Tobacco dependence       HHS  Since resolved  History of IDDM2  Meds on board  Follow Accu-Cheks/electrolytes  HbA1c 9.4  Poor control     DVT prophylaxis  Coumadin    Total critical care time: 50 minutes    Kaylie Damian MD   5/1/2022 12:32 PM

## 2022-05-01 NOTE — PROGRESS NOTES
Nephrology ([address] [facility]) Progress Note    Patient:  Claudia Nassar  YOB: 1970  Date of Service: 5/1/2022  MRN: [id number]   Acct: [de-identified]   Primary Care Physician: ROMINA Cash  Advance Directive: Full Code  Admit Date: 4/24/2022       Hospital Day: 7  Referring Provider: Dom Lee MD    Patient independently seen and examined, Chart, Consults, Notes, Operative notes, Labs, Cardiology, and Radiology studies reviewed as available. Chief complaint: Altered mental status/end-stage renal disease    Subjective:  Claudia Nassar is a 47 y.o. female for whom we were consulted for evaluation and treatment of end-stage renal disease. Patient also has history of seizure disorder, type 2 diabetes, hypertension, frequent hospitalization with noncompliance and poorly controlled diabetes. Patient was brought to the emergency room after she was found to have unresponsiveness and witnessed grand mal seizure. She was brought in by ambulance. On arrival in the emergency room she was found to be severely hyperglycemic and had a urinary tract infection. Patient was intubated, sedated and currently being treated by neurology for grand mal seizure. She is also receiving IV antibiotics and insulin drip. Her blood sugar control has significantly improved. On April 25, she has received emergent hemodialysis treatment for correction of volume status as well as hyponatremia. She is now moved from ICU to CCU. This morning patient is intubated/sedated. She is currently on a weaning protocol with less sedation. She follows vocal command. Her hemodynamics are stable. Yesterday she underwent dialysis with large ultrafiltration.   .    Allergies:  Penicillins, Lamisil advanced [terbinafine], Reglan [metoclopramide], and Stadol [butorphanol]    Medicines:  Current Facility-Administered Medications   Medication Dose Route Frequency Provider Last Rate Last Admin    insulin glargine (LANTUS) injection vial 5 Units  5 Units SubCUTAneous Nightly Laya Klein MD   5 Units at 04/30/22 1744    aspirin EC tablet 325 mg  325 mg Oral Daily Glenys Byrne MD   325 mg at 05/01/22 0816    niCARdipine (CARDENE) 25 mg in dextrose 5 % 250 mL infusion  2.5-15 mg/hr IntraVENous Continuous Glenys Byrne MD 10 mL/hr at 05/01/22 0911 1 mg/hr at 05/01/22 0911    dexmedetomidine (PRECEDEX) 400 mcg in sodium chloride 0.9 % 100 mL infusion  0.1-1.5 mcg/kg/hr IntraVENous Continuous Blima Brightly, DO   Stopped at 05/01/22 0913    menthol-zinc oxide (CALMOSEPTINE) 0.44-20.6 % ointment   Topical BID Laya Klein MD   Given at 05/01/22 0600    sodium phosphate 10.5 mmol in sodium chloride 0.9 % 250 mL IVPB  0.16 mmol/kg IntraVENous PRN Patience Walter, DO        Or    sodium phosphate 21 mmol in sodium chloride 0.9 % 250 mL IVPB  0.32 mmol/kg IntraVENous PRN Patience Walter, DO        insulin lispro (HUMALOG) injection vial 0-12 Units  0-12 Units SubCUTAneous TID WC Blima Brightly, DO   6 Units at 05/01/22 0816    insulin lispro (HUMALOG) injection vial 0-6 Units  0-6 Units SubCUTAneous Nightly Blima Brightly, DO   2 Units at 04/30/22 2126    vancomycin (VANCOCIN) oral solution 125 mg  125 mg Oral 4 times per day Blima Brightly, DO   125 mg at 05/01/22 3641    hydrALAZINE (APRESOLINE) tablet 100 mg  100 mg Oral 3 times per day Patience Walter, DO   100 mg at 04/28/22 2014    propofol injection  5-50 mcg/kg/min IntraVENous Once Blima Brightly, DO 11.2 mL/hr at 04/24/22 1505 25 mcg/kg/min at 04/24/22 1505    glucagon (rDNA) injection 1 mg  1 mg IntraMUSCular PRN Blima Brightly, DO        dextrose 5 % solution  100 mL/hr IntraVENous PRN Blima Brightly, DO        glucose chewable tablet 16 g  4 tablet Oral PRN Blima Brightly, DO        dextrose bolus (hypoglycemia) 10% 125 mL  125 mL IntraVENous PRN Blima Brightly, DO   Stopped at 04/25/22 8511    Or    dextrose bolus (hypoglycemia) 10% 250 mL  250 mL IntraVENous PRN Cozette Dear, DO        sodium chloride flush 0.9 % injection 5-40 mL  5-40 mL IntraVENous 2 times per day Cozette Dear, DO   10 mL at 05/01/22 4716    sodium chloride flush 0.9 % injection 5-40 mL  5-40 mL IntraVENous PRN Cozette Dear, DO        0.9 % sodium chloride infusion   IntraVENous PRN Cozette Dear, DO        LORazepam (ATIVAN) injection 1 mg  1 mg IntraVENous Q5 Min PRN Cozette Dear, DO        heparin (porcine) injection 5,000 Units  5,000 Units SubCUTAneous TID Cozette Dear, DO   5,000 Units at 05/01/22 1843    ondansetron (ZOFRAN-ODT) disintegrating tablet 4 mg  4 mg Oral Q8H PRN Cozette Dear, DO        Or    ondansetron [facility]) injection 4 mg  4 mg IntraVENous Q6H PRN Cozette Dear, DO        polyethylene glycol [facility]) packet 17 g  17 g Oral Daily PRN Cozette Dear, DO        acetaminophen (TYLENOL) tablet 650 mg  650 mg Oral Q6H PRN Cozette Dear, DO   650 mg at 04/25/22 7877    Or    acetaminophen (TYLENOL) suppository 650 mg  650 mg Rectal Q6H PRN Cozette Dear, DO        levETIRAcetam (KEPPRA) 500 mg/100 mL IVPB  500 mg IntraVENous Q12H Cozette Dear, DO   Stopped at 05/01/22 0110    chlorhexidine (PERIDEX) 0.12 % solution 15 mL  15 mL Mouth/Throat BID Cozette Dear, DO   15 mL at 05/01/22 1094    famotidine (PEPCID) 20 mg in sodium chloride (PF) 10 mL injection  20 mg IntraVENous Daily Cozette Dear, DO   20 mg at 05/01/22 9692    propofol injection  5-50 mcg/kg/min IntraVENous Continuous Cozette Dear, DO   Stopped at 04/28/22 3353    magic butt cream   Topical Q4H PRN Cozette Dear, DO   Given at 04/25/22 0028    magnesium sulfate 1000 mg in dextrose 5% 100 mL IVPB  1,000 mg IntraVENous PRN Patience Walter, DO        potassium bicarb-citric acid (EFFER-K) effervescent tablet 40 mEq  40 mEq Oral PRN Patience Walter, DO        Or    potassium chloride 10 mEq/100 mL IVPB (Peripheral Line)  10 mEq IntraVENous PRN Mihai Jose, DO   Stopped at 04/25/22 0936       Past Medical History:  Past Medical History:   Diagnosis Date    [facility] ischemic stroke ([email or web address] 4/30/2022    Arthritis     Chronic kidney disease, stage 5, kidney failure (HCC)     Closed fracture of right shoulder girdle, with routine healing, subsequent encounter     DM (diabetes mellitus) type II controlled with renal manifestation ([facility] 06/1993    Gastroparesis     GERD (gastroesophageal reflux disease)     Hemodialysis patient ([facility]     dialysis on tues, thur, and sat at [facility]    Hypertension     Hypothyroid     Nasal congestion     recent    Neuropathy     Noncompliance with medications     Palliative care patient 10/08/2019    Restless leg syndrome        Past Surgical History:  Past Surgical History:   Procedure Laterality Date    BREAST SURGERY Left 2008    infected milk gland removed    BREAST SURGERY Right 5/25/2021    WEDGE EXCISION RIGHT BREAST DUCT WITH LACRIMAL DUCT PROBE, PEC BLOCK performed by Greg Sanford MD at [facility]      2008    COLONOSCOPY Left 9/17/2020    Dr Sharona Espino hepatic flexure-Severe tortuosity with severe spasming, 1 yr recall    DIALYSIS FISTULA CREATION Left 1/25/2019    REVISION LEFT UPPER EXTREMITY AV ACCESS WITH LEFT BRACHIAL ARTERY TO LEFT AXILLARY VEIN WITH  INTERPOSITIONAL ARTEGRAFT   performed by Alexia Day MD at [address]  2012    [address] Right 1/25/2019    INSERTION OF RIGHT INTERNAL JUGULAR HEMODIALYSIS CATHETER performed by Alexia Day MD at [address] [facility]  08/17/2018    1.  Moderately increased stainable iron identified by special stain in Kupffer cells and occasional hepatocytes. Negative for evidence of significant portal or lobular inflammation.  Negative for evidence of significant fibrosis    [place] COLONOSCOPY W/BIOPSY SINGLE/MULTIPLE N/A 10/20/2017    Dr Eric García prep-Tubular AP (-) dysplasia x 2--3 yr recall    [place] EGD TRANSORAL BIOPSY SINGLE/MULTIPLE N/A 2016    Dr Carmenza Romero EGD TRANSORAL BIOPSY SINGLE/MULTIPLE N/A 10/20/2017    Dr Joseph Rivera (-)    TUBAL LIGATION          VASCULAR SURGERY Left 2016    fistula by Dr Chester Mg at [address]  10/02/2017    [facility]. Left upper fistulograms/venograms, balloon angioplasty cephalic vein arch 04C29 conquest.    VASCULAR SURGERY  2018    FISTULOGRAM    VASCULAR SURGERY  10/29/2018    [facility]. Left upper fistulograms/venograms    VASCULAR SURGERY  2018    [facility]. Arch aortogram,left upper arteriograms.  VASCULAR SURGERY  2019    [facility]. Removal of tunneled dialyis catheter right internal jugular vein.  VASCULAR SURGERY  2019    [facility]. Left upper extremity fistulogram including venography to the superior vena cava. Balloon angioplasty left subclavian vein with 10mm x 40mm conquest balloon. Balloon angioplasty mid/proximal upper arm cephalic vein with 35LB x 40mm conquest balloon. Venograms after balloon angioplasty. Stent left mid/proximal upper arm cephalic vein stenosis fluency 10mm x 60mm self-expanding covered stent. Balloon     VASCULAR SURGERY  2019    cont angioplasty stent with 10mm x 40mm conquest balloon. Completion venograms left upper extremity.        Family History  Family History   Problem Relation Age of Onset    Colon Cancer Mother          at 63    Colon Polyps Mother     Lung Cancer Father          at 79    Colon Polyps Father     Stomach Cancer Sister     Breast Cancer Sister 27    Depression Daughter 25        committed suicide    Liver Cancer Neg Hx     Liver Disease Neg Hx     Esophageal Cancer Neg Hx     Rectal Cancer Neg Hx        Social History  Social History     Socioeconomic History    Marital status: Single     Spouse name: Not on file    Number of children: 2    Years of education: Not on file    Highest education level: Not on file   Occupational History    Not on file   Tobacco Use    Smoking status: Current Every Day Smoker     Packs/day: 0.50     Years: 33.00     Pack years: 16.50     Types: Cigarettes    Smokeless tobacco: Never Used    Tobacco comment: NOW at 1/4 PD, started at age 24 and has averaged 2 PPD   Vaping Use    Vaping Use: Never used   Substance and Sexual Activity    Alcohol use: No    Drug use: Not Currently     Types: Marijuana Margdru Stanley)    Sexual activity: Not Currently     Partners: Male   Other Topics Concern    Not on file   Social History Narrative    Not on file     Social Determinants of Health     Financial Resource Strain:     Difficulty of Paying Living Expenses: Not on file   Food Insecurity:     Worried About Running Out of Food in the Last Year: Not on file    Kendy of Food in the Last Year: Not on file   Transportation Needs:     Lack of Transportation (Medical): Not on file    Lack of Transportation (Non-Medical):  Not on file   Physical Activity:     Days of Exercise per Week: Not on file    Minutes of Exercise per Session: Not on file   Stress:     Feeling of Stress : Not on file   Social Connections:     Frequency of Communication with Friends and Family: Not on file    Frequency of Social Gatherings with Friends and Family: Not on file    Attends Mandaeism Services: Not on file    Active Member of [address] [place] [address] or Organizations: Not on file    Attends Club or Organization Meetings: Not on file    Marital Status: Not on file   Intimate Partner Violence:     Fear of Current or Ex-Partner: Not on file    Emotionally Abused: Not on file    Physically Abused: Not on file    Sexually Abused: Not on file   Housing Stability:     Unable to Pay for Housing in the Last Year: Not on file    Number of [place] in the Last Year: Not on file    Unstable Housing in the Last Year: Not on file         Review of Systems:  Review of system is unobtainable as she is intubated and sedated.}       Objective:  Patient Vitals for the past 24 hrs:   BP Temp Temp src Pulse Resp SpO2 Weight   05/01/22 0900 (!) 147/80   72 16 100 %    05/01/22 0800 (!) 186/79 98 °F (36.7 °C) Temporal 79 16 100 %    05/01/22 0700 (!) 165/62   66 16 99 %    05/01/22 0631    70 16 99 %    05/01/22 0600 (!) 159/61   63 16 100 %    05/01/22 0500    71 16 100 %    05/01/22 0445 (!) 151/59   66 16     05/01/22 0400 (!) 142/78 97.3 °F (36.3 °C) Axillary 67 16  131 lb 12.8 oz (59.8 kg)   05/01/22 0300 (!) 160/62   73 16 100 %    05/01/22 0200 (!) 152/68   80 16 99 %    05/01/22 0100 (!) 166/74   86 16 100 %    05/01/22 0000 (!) 169/76 99.5 °F (37.5 °C) Axillary 77 16 99 %    04/30/22 2300 (!) 170/74   77 16 100 %    04/30/22 2200 (!) 169/74   80 16 100 %    04/30/22 2100 (!) 170/64   82 16 (!) 87 %    04/30/22 2000 (!) 164/68 99.8 °F (37.7 °C) Axillary 80 16 97 %    04/30/22 1900 (!) 175/84   85 16 99 %    04/30/22 1800 (!) 174/67   89 18 100 %    04/30/22 1700 (!) 179/72   93 18 100 %    04/30/22 1600 (!) 206/75   99 21 100 %    04/30/22 1500 (!) 195/70   90 9 99 %    04/30/22 1422    89 (!) 31 98 %    04/30/22 1400 (!) 205/68   87 (!) 31 98 %    04/30/22 1300 (!) 210/69   81 (!) 33 99 %    04/30/22 1200 (!) 204/73 97.8 °F (36.6 °C) Temporal 75 27 99 %    04/30/22 1100 (!) 201/75   80 27 100 %    04/30/22 1025    78 29 99 %    04/30/22 1000 (!) 169/64   76 25 99 %        Intake/Output Summary (Last 24 hours) at 5/1/2022 0913  Last data filed at 5/1/2022 0600  Gross per 24 hour   Intake 2414.57 ml   Output 198 ml   Net 2216.57 ml     General: Intubated/not sedated  HEENT: Normocephalic atraumatic head/orotracheal intubation  Neck: Supple without JVD or carotid bruits  Chest: Bilateral clear breath sounds. CVS: Regular rate and rhythm S1 and S2.   Abdominal: Soft and nondistended positive bowel sounds. Extremities: No pedal edema. Skin: warm and dry without rash    Labs:  BMP:   Recent Labs     04/29/22  0722 04/30/22 0153 05/01/22 0226   * 127* 132*   K 3.9 3.8 3.8   CL 90* 88* 92*   CO2 17* 18* 26   PHOS 3.6 4.1 2.6   BUN 19 26* 17   CREATININE 2.7* 3.3* 2.4*   CALCIUM 8.2* 8.9 8.6     CBC:   Recent Labs     04/29/22  0846 04/30/22 0153 05/01/22 0226   WBC 6.5 7.8 6.1   HGB 9.6* 9.7* 9.4*   HCT 31.7* 31.5* 30.3*   MCV 94.6 92.6 91.3   * 132 154     LIVER PROFILE:   Recent Labs     04/29/22 0722 04/30/22 0153 05/01/22 0226   AST 14 13 13   ALT 7 7 6   BILITOT 0.4 0.4 0.3   ALKPHOS 96 106* 95     PT/INR: No results for input(s): PROTIME, INR in the last 72 hours. APTT: No results for input(s): APTT in the last 72 hours. BNP:  No results for input(s): BNP in the last 72 hours. Ionized Calcium:No results for input(s): IONCA in the last 72 hours. Magnesium:  Recent Labs     04/29/22  0722 04/30/22 0153 05/01/22 0226   MG 2.3 2.4 2.2     Phosphorus:  Recent Labs     04/29/22  0722 04/30/22 0153 05/01/22 0226   PHOS 3.6 4.1 2.6     HgbA1C:   No results for input(s): LABA1C in the last 72 hours. Hepatic:   Recent Labs     04/29/22  0722 04/30/22 0153 05/01/22 0226   ALKPHOS 96 106* 95   ALT 7 7 6   AST 14 13 13   PROT 5.6* 5.9* 6.1*   BILITOT 0.4 0.4 0.3   LABALBU 3.0* 3.2* 2.9*     Lactic Acid: No results for input(s): LACTA in the last 72 hours. Troponin: No results for input(s): CKTOTAL, CKMB, TROPONINT in the last 72 hours. ABGs:   Lab Results   Component Value Date    PHART 7.330 04/24/2022    PO2ART 389.0 04/24/2022    TKP6PYW 41.0 04/24/2022     CRP:  No results for input(s): CRP in the last 72 hours. Sed Rate:  No results for input(s): SEDRATE in the last 72 hours. Culture Results:   Blood Culture Recent:   Recent Labs     04/24/22  1447   BC No growth after 5 days of incubation.        Urine Culture Recent :   Recent Labs     04/24/22  1354   Andrés Adames No growth at 36-48 hours       Radiology reports as per the Radiologist  Radiology: CT Head WO Contrast    Result Date: 4/24/2022  CT HEAD WO CONTRAST 4/24/2022 2:01 PM HISTORY: Altered mental status COMPARISON: CT scan dated 11/18/2021 DOSE LENGTH PRODUCT: 766 mGy cm TECHNIQUE: Helical tomographic images of the brain were obtained without the use of intravenous contrast. Automated exposure control was also utilized to decrease patient radiation dose. FINDINGS: There is no evidence of evolving large vascular territory infarct. No visualized intra-axial or extra-axial hemorrhage. No mass lesion is identified. Normal size and configuration of the ventricular system. The basal cisterns are symmetric. Posterior fossa structures are unremarkable. The included orbits and their contents are unremarkable. Chronic paranasal sinus disease on the left. The visualized osseous structures and overlying soft tissues of the skull and face are unremarkable. 1. No acute intracranial process. Signed by Dr Napoleon Donovan    Result Date: 4/24/2022  XR CHEST PORTABLE 4/24/2022 4:27 PM HISTORY: ET tube, enteric tube  Technique: Single AP view of the chest COMPARISONS: 4/24/2022 FINDINGS: Endotracheal tube is identified with tip essentially touching the krysta. Enteric tube courses into the stomach with tip curled in the fundus back towards the gastroesophageal junction. The lungs are clear and well expanded. Heart size is stable. Pulmonary vasculature are nondilated. No pleural effusion or pneumothorax. 1. Endotracheal tube is deep, tip essentially touching the krysta. Lungs are clear and well expanded. I would recommend 2-3 cm withdrawal for a more optimal positioning.  The results of this exam were discussed with Reyna Lovelace (ICU nursing) on 4/24/2022 at 1640 hours Signed by Dr Rashid Hodges    XR CHEST PORTABLE    Result Date: 4/24/2022  XR CHEST PORTABLE 4/24/2022 1:17 PM HISTORY: ET tube placement  Technique: Single AP view of the chest COMPARISONS: 4/24/2022 FINDINGS: Status post endotracheal intubation. ET tube is in good position. Lungs are well-expanded. Enteric tube curls on itself in the midesophagus and then extends back up into the throat. Heart size is stable. Pulmonary vasculature are nondilated. 1. ET tube is in good position. Lungs are clear and well expanded. 2. Enteric tube curls on itself in the mid esophagus before extending back up into the throat. This will need repositioned. The results of this exam were discussed with Dr. Ayla Colin on 4/24/2022 at 1329 hours Signed by Dr Ana Park    XR CHEST PORTABLE    Result Date: 4/24/2022  XR CHEST PORTABLE 4/24/2022 12:34 PM HISTORY: Altered mental status  Technique: Single AP view of the chest COMPARISONS: Chest exam dated 4/1/2022 FINDINGS: No lung consolidation. No pleural effusion or pneumothorax. Cardiomediastinal silhouette and pulmonary vasculature are unremarkable. No acute bony abnormality. Left subclavian region vascular stent with additional stent projecting over the left humerus. No acute cardiopulmonary process. Signed by Dr Marisol Barroso    Result Date: 4/1/2022  XR CHEST PORTABLE 4/1/2022 12:35 PM HISTORY: Shortness of breath  Technique: Single AP view of the chest COMPARISONS: Chest exam dated 1/22/2022 FINDINGS: Cardiomegaly with central pulmonary vascular congestion, interstitial and alveolar edema as well as bilateral moderate layering pleural effusions. No pneumothorax. No acute bony abnormality. 1. Volume overload with interstitial and carie pulmonary edema as well as bilateral moderate pleural effusions. Signed by Dr Shannan Kerr   1. End-stage renal disease/dialysis dependent. 2.  Type II diabetic nephropathy. 3.  Acute respiratory failure/intubated. 4.  Status post grand mal seizure  5. Severe hyponatremia due to volume overload. 6.  Anemia of chronic kidney disease.   7.  Urinary tract infection. 8.  Pulmonary edema  9. Hypokalemia  10. Acute ischemic brain infarct. Plan:  1. Possible ventilator weaning today  2. Continue IV antibiotics. 3.  Plan was discussed with critical care nurse.       Jose Guadalupe Reyna MD  05/01/22  9:13 AM

## 2022-05-01 NOTE — PROGRESS NOTES
12:35- Spontaneous breathing trial.   Respirations stayed in 30's, tidal volume 335, SBI I upper 80's.   Placed back on previous settings     Electronically signed by Hebert Whitmore RN on 5/1/2022 at 1:30 PM

## 2022-05-02 LAB
ALBUMIN SERPL-MCNC: 2.8 G/DL (ref 3.5–5.2)
ALP BLD-CCNC: 101 U/L (ref 35–104)
ALT SERPL-CCNC: 7 U/L (ref 5–33)
ANION GAP SERPL CALCULATED.3IONS-SCNC: 17 MMOL/L (ref 7–19)
AST SERPL-CCNC: 20 U/L (ref 5–32)
BASOPHILS ABSOLUTE: 0.1 K/UL (ref 0–0.2)
BASOPHILS RELATIVE PERCENT: 1.1 % (ref 0–1)
BILIRUB SERPL-MCNC: <0.2 MG/DL (ref 0.2–1.2)
BUN BLDV-MCNC: 32 MG/DL (ref 6–20)
CALCIUM SERPL-MCNC: 8.6 MG/DL (ref 8.6–10)
CHLORIDE BLD-SCNC: 94 MMOL/L (ref 98–111)
CHOLESTEROL, TOTAL: 150 MG/DL (ref 160–199)
CO2: 23 MMOL/L (ref 22–29)
CREAT SERPL-MCNC: 3.5 MG/DL (ref 0.5–0.9)
EOSINOPHILS ABSOLUTE: 0.2 K/UL (ref 0–0.6)
EOSINOPHILS RELATIVE PERCENT: 2.7 % (ref 0–5)
GFR AFRICAN AMERICAN: 17
GFR NON-AFRICAN AMERICAN: 14
GLUCOSE BLD-MCNC: 116 MG/DL (ref 74–109)
GLUCOSE BLD-MCNC: 181 MG/DL (ref 70–99)
GLUCOSE BLD-MCNC: 196 MG/DL (ref 70–99)
GLUCOSE BLD-MCNC: 243 MG/DL (ref 70–99)
GLUCOSE BLD-MCNC: 273 MG/DL (ref 70–99)
HCT VFR BLD CALC: 30.2 % (ref 37–47)
HDLC SERPL-MCNC: 52 MG/DL (ref 65–121)
HEMOGLOBIN: 8.9 G/DL (ref 12–16)
IMMATURE GRANULOCYTES #: 0 K/UL
INR BLD: 1.13 (ref 0.88–1.18)
LDL CHOLESTEROL CALCULATED: 88 MG/DL
LYMPHOCYTES ABSOLUTE: 0.8 K/UL (ref 1.1–4.5)
LYMPHOCYTES RELATIVE PERCENT: 14.7 % (ref 20–40)
MAGNESIUM: 2.6 MG/DL (ref 1.6–2.6)
MCH RBC QN AUTO: 28.8 PG (ref 27–31)
MCHC RBC AUTO-ENTMCNC: 29.5 G/DL (ref 33–37)
MCV RBC AUTO: 97.7 FL (ref 81–99)
MONOCYTES ABSOLUTE: 0.7 K/UL (ref 0–0.9)
MONOCYTES RELATIVE PERCENT: 12.2 % (ref 0–10)
NEUTROPHILS ABSOLUTE: 3.9 K/UL (ref 1.5–7.5)
NEUTROPHILS RELATIVE PERCENT: 69.1 % (ref 50–65)
PDW BLD-RTO: 18.7 % (ref 11.5–14.5)
PERFORMED ON: ABNORMAL
PHOSPHORUS: 3.6 MG/DL (ref 2.5–4.5)
PLATELET # BLD: 164 K/UL (ref 130–400)
PMV BLD AUTO: 10.5 FL (ref 9.4–12.3)
POTASSIUM SERPL-SCNC: 4.2 MMOL/L (ref 3.5–5)
PROTHROMBIN TIME: 14.4 SEC (ref 12–14.6)
RBC # BLD: 3.09 M/UL (ref 4.2–5.4)
SODIUM BLD-SCNC: 134 MMOL/L (ref 136–145)
TOTAL PROTEIN: 5.6 G/DL (ref 6.6–8.7)
TRIGL SERPL-MCNC: 48 MG/DL (ref 0–149)
WBC # BLD: 5.7 K/UL (ref 4.8–10.8)

## 2022-05-02 PROCEDURE — 2500000003 HC RX 250 WO HCPCS: Performed by: PSYCHIATRY & NEUROLOGY

## 2022-05-02 PROCEDURE — 85025 COMPLETE CBC W/AUTO DIFF WBC: CPT

## 2022-05-02 PROCEDURE — 85610 PROTHROMBIN TIME: CPT

## 2022-05-02 PROCEDURE — 2100000000 HC CCU R&B

## 2022-05-02 PROCEDURE — 80053 COMPREHEN METABOLIC PANEL: CPT

## 2022-05-02 PROCEDURE — 6370000000 HC RX 637 (ALT 250 FOR IP): Performed by: INTERNAL MEDICINE

## 2022-05-02 PROCEDURE — 2700000000 HC OXYGEN THERAPY PER DAY

## 2022-05-02 PROCEDURE — 82947 ASSAY GLUCOSE BLOOD QUANT: CPT

## 2022-05-02 PROCEDURE — 6370000000 HC RX 637 (ALT 250 FOR IP): Performed by: PSYCHIATRY & NEUROLOGY

## 2022-05-02 PROCEDURE — 6360000002 HC RX W HCPCS: Performed by: INTERNAL MEDICINE

## 2022-05-02 PROCEDURE — 2580000003 HC RX 258: Performed by: PSYCHIATRY & NEUROLOGY

## 2022-05-02 PROCEDURE — 2580000003 HC RX 258: Performed by: INTERNAL MEDICINE

## 2022-05-02 PROCEDURE — 80061 LIPID PANEL: CPT

## 2022-05-02 PROCEDURE — 8010000000 HC HEMODIALYSIS ACUTE INPT

## 2022-05-02 PROCEDURE — 2500000003 HC RX 250 WO HCPCS: Performed by: INTERNAL MEDICINE

## 2022-05-02 PROCEDURE — 99232 SBSQ HOSP IP/OBS MODERATE 35: CPT

## 2022-05-02 PROCEDURE — 36415 COLL VENOUS BLD VENIPUNCTURE: CPT

## 2022-05-02 PROCEDURE — 99233 SBSQ HOSP IP/OBS HIGH 50: CPT | Performed by: PSYCHIATRY & NEUROLOGY

## 2022-05-02 PROCEDURE — 94003 VENT MGMT INPAT SUBQ DAY: CPT

## 2022-05-02 PROCEDURE — 83735 ASSAY OF MAGNESIUM: CPT

## 2022-05-02 PROCEDURE — 6360000002 HC RX W HCPCS: Performed by: PSYCHIATRY & NEUROLOGY

## 2022-05-02 PROCEDURE — 84100 ASSAY OF PHOSPHORUS: CPT

## 2022-05-02 RX ORDER — PEPPERMINT OIL
SPIRIT MISCELLANEOUS AS NEEDED
Status: DISCONTINUED | OUTPATIENT
Start: 2022-05-02 | End: 2022-05-25 | Stop reason: HOSPADM

## 2022-05-02 RX ORDER — HEPARIN SODIUM 5000 [USP'U]/ML
5000 INJECTION, SOLUTION INTRAVENOUS; SUBCUTANEOUS 3 TIMES DAILY
Status: DISCONTINUED | OUTPATIENT
Start: 2022-05-02 | End: 2022-05-25 | Stop reason: HOSPADM

## 2022-05-02 RX ORDER — ASPIRIN 81 MG/1
81 TABLET ORAL DAILY
Status: DISCONTINUED | OUTPATIENT
Start: 2022-05-02 | End: 2022-05-03

## 2022-05-02 RX ADMIN — CHLORHEXIDINE GLUCONATE 15 ML: 1.2 RINSE ORAL at 08:20

## 2022-05-02 RX ADMIN — ANORECTAL OINTMENT: 15.7; .44; 24; 20.6 OINTMENT TOPICAL at 05:00

## 2022-05-02 RX ADMIN — HYDRALAZINE HYDROCHLORIDE 100 MG: 50 TABLET, FILM COATED ORAL at 20:26

## 2022-05-02 RX ADMIN — CHLORHEXIDINE GLUCONATE 15 ML: 1.2 RINSE ORAL at 20:26

## 2022-05-02 RX ADMIN — VANCOMYCIN HYDROCHLORIDE 125 MG: 1 INJECTION, POWDER, LYOPHILIZED, FOR SOLUTION INTRAVENOUS at 12:50

## 2022-05-02 RX ADMIN — LEVETIRACETAM 500 MG: 5 INJECTION INTRAVENOUS at 01:56

## 2022-05-02 RX ADMIN — INSULIN LISPRO 2 UNITS: 100 INJECTION, SOLUTION INTRAVENOUS; SUBCUTANEOUS at 08:20

## 2022-05-02 RX ADMIN — Medication 10 ML: at 08:20

## 2022-05-02 RX ADMIN — HYDRALAZINE HYDROCHLORIDE 100 MG: 50 TABLET, FILM COATED ORAL at 14:05

## 2022-05-02 RX ADMIN — ANORECTAL OINTMENT: 15.7; .44; 24; 20.6 OINTMENT TOPICAL at 18:06

## 2022-05-02 RX ADMIN — LEVETIRACETAM 500 MG: 5 INJECTION INTRAVENOUS at 12:52

## 2022-05-02 RX ADMIN — VANCOMYCIN HYDROCHLORIDE 125 MG: 1 INJECTION, POWDER, LYOPHILIZED, FOR SOLUTION INTRAVENOUS at 17:35

## 2022-05-02 RX ADMIN — DEXMEDETOMIDINE HYDROCHLORIDE 0.5 MCG/KG/HR: 4 INJECTION, SOLUTION INTRAVENOUS at 08:39

## 2022-05-02 RX ADMIN — INSULIN LISPRO 2 UNITS: 100 INJECTION, SOLUTION INTRAVENOUS; SUBCUTANEOUS at 20:25

## 2022-05-02 RX ADMIN — VANCOMYCIN HYDROCHLORIDE 125 MG: 1 INJECTION, POWDER, LYOPHILIZED, FOR SOLUTION INTRAVENOUS at 23:30

## 2022-05-02 RX ADMIN — DEXTROSE MONOHYDRATE 2.5 MG/HR: 5 INJECTION, SOLUTION INTRAVENOUS at 14:13

## 2022-05-02 RX ADMIN — DEXMEDETOMIDINE HYDROCHLORIDE 0.8 MCG/KG/HR: 4 INJECTION, SOLUTION INTRAVENOUS at 21:13

## 2022-05-02 RX ADMIN — INSULIN GLARGINE 5 UNITS: 100 INJECTION, SOLUTION SUBCUTANEOUS at 20:25

## 2022-05-02 RX ADMIN — INSULIN LISPRO 6 UNITS: 100 INJECTION, SOLUTION INTRAVENOUS; SUBCUTANEOUS at 17:35

## 2022-05-02 RX ADMIN — VANCOMYCIN HYDROCHLORIDE 125 MG: 1 INJECTION, POWDER, LYOPHILIZED, FOR SOLUTION INTRAVENOUS at 05:58

## 2022-05-02 RX ADMIN — HEPARIN SODIUM 5000 UNITS: 5000 INJECTION INTRAVENOUS; SUBCUTANEOUS at 15:23

## 2022-05-02 RX ADMIN — SODIUM CHLORIDE, PRESERVATIVE FREE 20 MG: 5 INJECTION INTRAVENOUS at 08:20

## 2022-05-02 RX ADMIN — HEPARIN SODIUM 5000 UNITS: 5000 INJECTION INTRAVENOUS; SUBCUTANEOUS at 20:25

## 2022-05-02 RX ADMIN — INSULIN LISPRO 2 UNITS: 100 INJECTION, SOLUTION INTRAVENOUS; SUBCUTANEOUS at 12:53

## 2022-05-02 RX ADMIN — ASPIRIN 81 MG: 81 TABLET, COATED ORAL at 09:39

## 2022-05-02 RX ADMIN — Medication 10 ML: at 20:26

## 2022-05-02 RX ADMIN — HEPARIN SODIUM 5000 UNITS: 5000 INJECTION INTRAVENOUS; SUBCUTANEOUS at 08:20

## 2022-05-02 RX ADMIN — VANCOMYCIN HYDROCHLORIDE 125 MG: 1 INJECTION, POWDER, LYOPHILIZED, FOR SOLUTION INTRAVENOUS at 00:26

## 2022-05-02 ASSESSMENT — PULMONARY FUNCTION TESTS
PIF_VALUE: 24
PIF_VALUE: 24
PIF_VALUE: 25
PIF_VALUE: 21
PIF_VALUE: 25
PIF_VALUE: 21
PIF_VALUE: 21
PIF_VALUE: 22
PIF_VALUE: 24
PIF_VALUE: 22
PIF_VALUE: 25
PIF_VALUE: 26
PIF_VALUE: 28
PIF_VALUE: 25
PIF_VALUE: 69
PIF_VALUE: 22
PIF_VALUE: 31
PIF_VALUE: 24
PIF_VALUE: 25
PIF_VALUE: 32
PIF_VALUE: 27
PIF_VALUE: 25
PIF_VALUE: 22
PIF_VALUE: 24
PIF_VALUE: 21
PIF_VALUE: 27
PIF_VALUE: 25
PIF_VALUE: 26
PIF_VALUE: 29

## 2022-05-02 ASSESSMENT — PAIN SCALES - GENERAL
PAINLEVEL_OUTOF10: 0
PAINLEVEL_OUTOF10: 1
PAINLEVEL_OUTOF10: 0
PAINLEVEL_OUTOF10: 0

## 2022-05-02 NOTE — PROGRESS NOTES
[facility] Neurology Progress Note      Patient:   Ina Zamarripa  MR#:    [id number]   Room:    0702/702-01   YOB: 1970  Date of Progress Note: 5/2/2022  Time of Note                           8:37 AM  Consulting Physician:  Anny Santiago DO  Attending Physician:  Annika Lama MD      INTERVAL HISTORY:  No acute events, remains intubated, sedated on precedex, responsive when sedation is off. REVIEW OF SYSTEMS:  Unable to obtain     PHYSICAL EXAM:    Constitutional    BP (!) 196/65   Pulse 67   Temp 98 °F (36.7 °C) (Axillary)   Resp 16   Ht 5' 6\" (1.676 m)   Wt 134 lb (60.8 kg)   SpO2 100%   BMI 21.63 kg/m²   General appearance: Intubated, sedated   EYES -   Conjunctiva normal  Pupillary exam as below, see CN exam in the neurologic exam  ENT-    No scars, masses, or lesions over external nose or ears  Oropharynx - intubated  Cardiovascular -   No clubbing, cyanosis  Pulmonary-   Mechanically ventilated   Musculoskeletal    No significant wasting of muscles noted  Gait as below, see gait exam in the neurologic exam  Muscle strength, tone, stability as below see the motor exam in the neurologic exam.   No bony deformities  Skin    Warm, dry, and intact to inspection and palpation. No rash, erythema, or pallor        NEUROLOGICAL EXAM     Mental status    []? Awake, alert, oriented   []? Affect attention and concentration appear appropriate  []? Recent and remote memory appears unremarkable  []? Speech normal without dysarthria or aphasia, comprehension and repetition intact. COMMENTS:  Unresponsive to voice, not following commands, does appear to localize to pain    Cranial Nerves []? No VF deficit to confrontation,  optic discs normal, no papilledema on fundoscopic exam.  []? PERRLA, EOMI, no nystagmus, conjugate eye movements, no ptosis  []? Face symmetric  []? Facial sensation intact  []? Tongue midline no atrophy or fasciculations present  []?  Palate midline, hearing to finger rub normal  []? Shoulder shrug and SCM testing normal  COMMENTS:  PERRL, Face appears sym, OCR present   Motor   []? 5/5 strength x 4 extremities  [x]? Normal bulk and tone  [x]? No tremor present  []? No rigidity or bradykinesia noted  COMMENTS: Withdrawal x 4, less over RUE   Sensory  []? Sensation intact to light touch, pin prick, vibration, and proprioception BLE  []? Sensation intact to light touch, pin prick, vibration, and proprioception BUE  COMMENTS: Limited    Coordination []? FTN normal bilaterally   []? HTS normal bilaterally  []? SANFORD normal.   COMMENTS: Limited    Reflexes  [x]? Symmetric and non-pathological  [x]? Toes downgoing bilaterally  [x]? No clonus present  COMMENTS:   Gait                  []? Normal steady gait    []? Ataxic    []? Spastic     []? Magnetic     []? Shuffling  [x]? Not assessed  COMMENTS:        LABS/IMAGING:    CT Head WO Contrast    Result Date: 4/24/2022  CT HEAD WO CONTRAST 4/24/2022 2:01 PM HISTORY: Altered mental status COMPARISON: CT scan dated 11/18/2021 DOSE LENGTH PRODUCT: 766 mGy cm TECHNIQUE: Helical tomographic images of the brain were obtained without the use of intravenous contrast. Automated exposure control was also utilized to decrease patient radiation dose. FINDINGS: There is no evidence of evolving large vascular territory infarct. No visualized intra-axial or extra-axial hemorrhage. No mass lesion is identified. Normal size and configuration of the ventricular system. The basal cisterns are symmetric. Posterior fossa structures are unremarkable. The included orbits and their contents are unremarkable. Chronic paranasal sinus disease on the left. The visualized osseous structures and overlying soft tissues of the skull and face are unremarkable. 1. No acute intracranial process.  Signed by Dr Jodie Barrera    XR CHEST PORTABLE    Result Date: 4/24/2022  XR CHEST PORTABLE 4/24/2022 4:27 PM HISTORY: ET tube, enteric tube  Technique: Single AP view of the chest COMPARISONS: 4/24/2022 FINDINGS: Endotracheal tube is identified with tip essentially touching the krysta. Enteric tube courses into the stomach with tip curled in the fundus back towards the gastroesophageal junction. The lungs are clear and well expanded. Heart size is stable. Pulmonary vasculature are nondilated. No pleural effusion or pneumothorax. 1. Endotracheal tube is deep, tip essentially touching the krysta. Lungs are clear and well expanded. I would recommend 2-3 cm withdrawal for a more optimal positioning. The results of this exam were discussed with [facility] (ICU nursing) on 4/24/2022 at 1640 hours Signed by Dr Juju Fierro    XR CHEST PORTABLE    Result Date: 4/24/2022  XR CHEST PORTABLE 4/24/2022 1:17 PM HISTORY: ET tube placement  Technique: Single AP view of the chest COMPARISONS: 4/24/2022 FINDINGS: Status post endotracheal intubation. ET tube is in good position. Lungs are well-expanded. Enteric tube curls on itself in the midesophagus and then extends back up into the throat. Heart size is stable. Pulmonary vasculature are nondilated. 1. ET tube is in good position. Lungs are clear and well expanded. 2. Enteric tube curls on itself in the mid esophagus before extending back up into the throat. This will need repositioned. The results of this exam were discussed with Dr. JUÁREZ [facility] on 4/24/2022 at 1329 hours Signed by Dr Juju Fierro    XR CHEST PORTABLE    Result Date: 4/24/2022  XR CHEST PORTABLE 4/24/2022 12:34 PM HISTORY: Altered mental status  Technique: Single AP view of the chest COMPARISONS: Chest exam dated 4/1/2022 FINDINGS: No lung consolidation. No pleural effusion or pneumothorax. Cardiomediastinal silhouette and pulmonary vasculature are unremarkable. No acute bony abnormality. Left subclavian region vascular stent with additional stent projecting over the left humerus. No acute cardiopulmonary process.  Signed by Dr Juju Fierro      Lab Results   Component Value Date WBC 5.7 05/02/2022    HGB 8.9 (L) 05/02/2022    HCT 30.2 (L) 05/02/2022    MCV 97.7 05/02/2022     05/02/2022     Lab Results   Component Value Date     (L) 05/02/2022    K 4.2 05/02/2022    CL 94 (L) 05/02/2022    CO2 23 05/02/2022    BUN 32 (H) 05/02/2022    CREATININE 3.5 (H) 05/02/2022    GLUCOSE 116 (H) 05/02/2022    CALCIUM 8.6 05/02/2022    PROT 5.6 (L) 05/02/2022    LABALBU 2.8 (L) 05/02/2022    BILITOT <0.2 05/02/2022    ALKPHOS 101 05/02/2022    AST 20 05/02/2022    ALT 7 05/02/2022    LABGLOM 14 (A) 05/02/2022    GFRAA 17 (L) 05/02/2022     Lab Results   Component Value Date    INR 1.13 05/02/2022    INR 1.06 05/01/2022    INR 0.96 01/01/2022    PROTIME 14.4 05/02/2022    PROTIME 13.7 05/01/2022    PROTIME 13.0 01/01/2022       RECORD REVIEW:   Previous medical records, medications were reviewed at today's visit. Nursing/physician notes, imaging, labs and vitals reviewed. ASSESSMENT:  47 y.o. admitted after an episode of unresponsiveness followed by generalized tonic-clonic event in the emergency department. She had profoundly elevated glucose, possible hyperglycemic coma, hyponatremia, underlying UTI,  end-stage renal disease with a history of noncompliance noted as well. MRI with large scattered left MCA distribution stroke. ECHO negative from a cardioembolic standpoint. Exam remains stable.      PLAN:  1. Stroke is large, risk for hemorraghic transformation is felt moderate to high. On coumadin given embolic appearance of stroke, though ECHO negative, no a fib on tele. Will stop coumadin for now, restart ASA. Follow up hypercoagulable labs. 2.  Continue Keppra  3. Continue addressing blood glucose, hyponatremia, UTI, c diff, ESRD nephrology following  4. Wean precedex, extubate when able     Please feel free to call with any questions. [phone] (cell phone).       Lotus Jo DO  Board Certified Neurology

## 2022-05-02 NOTE — PROGRESS NOTES
Palliative Care Progress Note  5/2/2022 8:23 AM    Patient:  Nu Fiore  YOB: 1970  Primary Care Physician: ROMINA Logan  Advance Directive: Full Code  Admit Date: 4/24/2022       Hospital Day: 8  Portions of this note have been copied forward, however, changed to reflect the most current clinical status of this patient. CHIEF COMPLAINT/REASON FOR CONSULTATION Goals of care    SUBJECTIVE:  Ms. Anshu Galeano remains intubated in CCU and sedated with precedex. Currently receiving HD treatment. Review of Systems:   14 point review of systems is negative except as specifically addressed above. Objective:   VITALS:  BP (!) 196/65   Pulse 67   Temp 98 °F (36.7 °C) (Axillary)   Resp 16   Ht 5' 6\" (1.676 m)   Wt 134 lb (60.8 kg)   SpO2 100%   BMI 21.63 kg/m²   24HR INTAKE/OUTPUT:      Intake/Output Summary (Last 24 hours) at 5/2/2022 4068  Last data filed at 5/2/2022 0600  Gross per 24 hour   Intake 2004.99 ml   Output 15 ml   Net 1989.99 ml       General appearance: 46 yo female, chronically ill appearing, intubated/sedated, NAD  Head: Normocephalic, without obvious abnormality, atraumatic  Eyes: conjunctivae/corneas clear. PERRL  Ears: normal external ears and nose  Neck: supple, symmetrical, trachea midline   Lungs: scattered rhonchi bilaterally to asucultation, mechanically ventilated, equal chest rise  Heart: regular rate and rhythm, S1, S2 normal, no murmur  Abdomen: soft, no grimacing w/ palpation; non-distended, bowel sounds present .  FMS in place with diarrhea noted   Extremities:No lower extremity edema,  No erythema, no grimacing w/ palpation  Skin: warm, dry  Neurologic: Intubated/sedated, opens eyes to voice, localizes to pain    Medications:      niCARdipine Stopped (05/01/22 1610)    dexmedetomidine HCl in NaCl 0.7 mcg/kg/hr (05/02/22 0600)    dextrose      sodium chloride        heparin (porcine)  5,000 Units SubCUTAneous TID    warfarin placeholder: dosing by pharmacy   Other RX Placeholder    insulin glargine  5 Units SubCUTAneous Nightly    menthol-zinc oxide   Topical BID    insulin lispro  0-12 Units SubCUTAneous TID WC    insulin lispro  0-6 Units SubCUTAneous Nightly    vancomycin  125 mg Oral 4 times per day    hydrALAZINE  100 mg Oral 3 times per day    sodium chloride flush  5-40 mL IntraVENous 2 times per day    levetiracetam  500 mg IntraVENous Q12H    chlorhexidine  15 mL Mouth/Throat BID    famotidine (PEPCID) injection  20 mg IntraVENous Daily     sodium phosphate IVPB **OR** sodium phosphate IVPB, glucagon (rDNA), dextrose, glucose, dextrose bolus (hypoglycemia) **OR** dextrose bolus (hypoglycemia), sodium chloride flush, sodium chloride, LORazepam, ondansetron **OR** ondansetron, polyethylene glycol, acetaminophen **OR** acetaminophen, magic butt cream, magnesium sulfate, potassium alternative oral replacement **OR** potassium chloride  Diet NPO  ADULT TUBE FEEDING; Orogastric; Peptide Based; Continuous; 30; Yes; 5; Q 6 hours; 54; 0; Other (specify); no free water flush at this time     Lab and other Data:     Recent Labs     04/30/22 0153 05/01/22 0226 05/02/22 0318   WBC 7.8 6.1 5.7   HGB 9.7* 9.4* 8.9*    154 164     Recent Labs     04/30/22 0153 05/01/22 0226 05/02/22 0318   * 132* 134*   K 3.8 3.8 4.2   CL 88* 92* 94*   CO2 18* 26 23   BUN 26* 17 32*   CREATININE 3.3* 2.4* 3.5*   GLUCOSE 264* 156* 116*     Recent Labs     04/30/22 0153 05/01/22 0226 05/02/22 0318   AST 13 13 20   ALT 7 6 7   BILITOT 0.4 0.3 <0.2   ALKPHOS 106* 95 101       Assessment/Plan   Principal Problem:    Acute ischemic stroke (HCC)  Active Problems:    Weakness    Gastroesophageal reflux disease without esophagitis    Acquired hypothyroidism    Anemia in chronic kidney disease (CKD)    Type 2 diabetes mellitus with chronic kidney disease on chronic dialysis, with long-term current use of insulin (HCC)    Acute encephalopathy    Respiratory failure ([facility] [address]    Uncontrolled type 2 diabetes mellitus with complication, with long-term current use of insulin ([facility]    ESRD (end stage renal disease) on dialysis ([facility] [facility]    PVD (peripheral vascular disease) ([facility] [address] [facility]    ESRD on hemodialysis ([facility] [address] [facility]    Hyponatremia    Hyperglycemia due to type 2 diabetes mellitus ([facility]    Uncontrolled hypertension    Seizure ([facility] [address]    Palliative care patient    Uncontrolled type 2 diabetes mellitus with hyperosmolar nonketotic hyperglycemia (HCC)    Altered mental state    Acute hypoxemic respiratory failure (HCC)    Generalized weakness    UTI (urinary tract infection)    Closed fracture of left distal femur (HCC)    History of infection with vancomycin resistant Enterococcus (VRE)  Resolved Problems:    * No resolved hospital problems. *      Visit Summary:  Chart reviewed, patient discussed with nursing staff. Reviewed health issues, work up and treatment plan as well as factors that lead to hospitalization. Ms. Carlos Eduardo Mata was seen at her bedside with RN present. She remains intubated/sedated and has failed SBTs over weekend. MRI brain completed on 4/30 and she was found to have large left MCA distribution stroke. Coumadin switched to ASA per neuro d/t risk of hemorrhagic conversion. No further seizure activity noted. LTACH referral denied by insurance as she will need trach/PEG placement prior to admission. I called and spoke with her significant other/surrogate decision maker, Lenora Abad, to update on pts status and plan of care. We discussed pts inability to wean from vent this far. I provided information regarding denial to [facility] and trach/PEG placement requirement. I explained that SBT attempts would be continued in the hopes to wean pt off vent. He remains hopeful pt will improve. I offered a bedside visit and he tells me he does not have a drivers license. I explained that a visit could be scheduled this week so he could have time to make arrangements for a ride. He will consider this.  I encouraged him to visit in order to see pt and spend time with her as this can help with decision making. He verbalized understanding. Opportunity for questions and emotional support provided. Will follow. Recommendations:   1. Palliative care: GOC continue aggressive course and monitor for improvement. D/c planning based on hospital course. SW following. Code status: Full code    2. Acute hypoxemic respiratory failure-minimal vent requirements. SBT failed yesterday 5/1, will attempt again following dialysis this afternoon. 3. Acute left MCA infarct- mgmt per neuro. MRI 4/29/22 noted. Echo negative 4/30/22. 4. Hyperglycemia w/ Hx of DM II-mgmt per Hospitalist, SSI    5. S/p tonic-clonic seizure-mgmt per Neurology, cEEG discontinued, Keppra 500 mg BID    6. Concern for UTI-Urine cx no growth     7. ESRD on HD-Nephrology managing. Dialysis today    8. Hypertension-Hydralazine 100 mg TID, cardene gtt as needed    9. Hyponatremia-improving    10. Clostridium difficile/Norovirus-Oral vancomycin     Thank you for consulting Palliative Care and allowing us to participate in the care of this patient.    Time Spent Counseling > 50%:  YES                                   Total Time Spent with patient/family counseling, workup/treatment review, counseling and placement of orders/preparation of this note: 33 minutes    Electronically signed by ROMINA Orona CNP on 5/2/2022 at 8:23 AM    (Please note that portions of this note were completed with a voice recognition program.  Evan Resendiz made to edit the dictations but occasionally words are mis-transcribed.)

## 2022-05-02 NOTE — PROGRESS NOTES
Nephrology ([address]) Progress Note    Patient:  Helena Luu  YOB: 1970  Date of Service: 5/2/2022  MRN: [id number]   Acct: [de-identified]   Primary Care Physician: ROMINA Adamson  Advance Directive: Full Code  Admit Date: 4/24/2022       Hospital Day: 8  Referring Provider: Lary Lennox, MD    Patient independently seen and examined, Chart, Consults, Notes, Operative notes, Labs, Cardiology, and Radiology studies reviewed as available. Subjective:  Helena Luu is a 47 y.o. female for whom we were consulted for evaluation and treatment of end-stage renal disease. Patient also has a history of seizure disorder, type 2 diabetes, hypertension, frequent hospitalization with noncompliance and poorly controlled diabetes. Patient was brought to the emergency room after she was found to have unresponsiveness and witnessed grand mal seizure. She was brought in by ambulance. On arrival in the emergency room, she was found to be severely hyperglycemic and had a urinary tract infection. Patient was intubated, sedated and treated by neurology for grand mal seizure. She was also receiving IV antibiotics and insulin drip. Her blood sugar control had significantly improved. On April 25, she has received emergent hemodialysis treatment for correction of volume status as well as hyponatremia. She was moved from ICU to CCU. Today, no overnight events. Patient remains on the ventilator and unable to participate in the history and physical examination. Events reviewed with nursing at the bedside. No family present.   She was tolerating dialysis without issue and hope for potential extubation after dialysis    Dialysis   Pt was seen on renal replacement therapy and I have personally seen and evaluated the patient and directed the therapy  Modality: Hemodialysis  Access: Arterial Venous Fistula  Location: left upper  QB: 450  QD: 700  UF: 1300      Allergies:  Penicillins, Lamisil advanced [terbinafine], Reglan [metoclopramide], and Stadol [butorphanol]    Medicines:  Current Facility-Administered Medications   Medication Dose Route Frequency Provider Last Rate Last Admin    aspirin EC tablet 81 mg  81 mg Oral Daily Mihai Flash, DO   81 mg at 05/02/22 5603    heparin (porcine) injection 5,000 Units  5,000 Units SubCUTAneous TID Laya Klein MD        insulin glargine (LANTUS) injection vial 5 Units  5 Units SubCUTAneous Nightly Laya Klein MD   5 Units at 05/01/22 2148    niCARdipine (CARDENE) 25 mg in dextrose 5 % 250 mL infusion  2.5-15 mg/hr IntraVENous Continuous Glenys Byrne MD   Stopped at 05/01/22 1610    dexmedetomidine (PRECEDEX) 400 mcg in sodium chloride 0.9 % 100 mL infusion  0.1-1.5 mcg/kg/hr IntraVENous Continuous Blima Brightly, DO 6.6 mL/hr at 05/02/22 1231 0.4 mcg/kg/hr at 05/02/22 1231    menthol-zinc oxide (CALMOSEPTINE) 0.44-20.6 % ointment   Topical BID Laya Klein MD   Given at 05/02/22 0500    sodium phosphate 10.5 mmol in sodium chloride 0.9 % 250 mL IVPB  0.16 mmol/kg IntraVENous PRN Patience Walter, DO        Or    sodium phosphate 21 mmol in sodium chloride 0.9 % 250 mL IVPB  0.32 mmol/kg IntraVENous PRN Patience Walter, DO        insulin lispro (HUMALOG) injection vial 0-12 Units  0-12 Units SubCUTAneous TID WC Blima Brightly, DO   2 Units at 05/02/22 0820    insulin lispro (HUMALOG) injection vial 0-6 Units  0-6 Units SubCUTAneous Nightly Blima Brightly, DO   1 Units at 05/01/22 2141    vancomycin (VANCOCIN) oral solution 125 mg  125 mg Oral 4 times per day Blima Brightly, DO   125 mg at 05/02/22 1250    hydrALAZINE (APRESOLINE) tablet 100 mg  100 mg Oral 3 times per day Patience Walter, DO   100 mg at 04/28/22 2014    glucagon (rDNA) injection 1 mg  1 mg IntraMUSCular PRN Blima Brightly, DO        dextrose 5 % solution  100 mL/hr IntraVENous PRN Blima Brightly, DO        glucose chewable tablet 16 g 4 tablet Oral PRN Graeme Lava, DO        dextrose bolus (hypoglycemia) 10% 125 mL  125 mL IntraVENous PRN Graeme Lava, DO   Stopped at 04/25/22 2583    Or    dextrose bolus (hypoglycemia) 10% 250 mL  250 mL IntraVENous PRN Graeme Lava, DO        sodium chloride flush 0.9 % injection 5-40 mL  5-40 mL IntraVENous 2 times per day Graeme Lava, DO   10 mL at 05/02/22 0820    sodium chloride flush 0.9 % injection 5-40 mL  5-40 mL IntraVENous PRN Graeme Lava, DO        0.9 % sodium chloride infusion   IntraVENous PRN Graeme Lava, DO        LORazepam (ATIVAN) injection 1 mg  1 mg IntraVENous Q5 Min PRN Graeme Lava, DO        ondansetron (ZOFRAN-ODT) disintegrating tablet 4 mg  4 mg Oral Q8H PRN Graeme Lava, DO        Or    ondansetron [facility]) injection 4 mg  4 mg IntraVENous Q6H PRN Graeme Lava, DO        polyethylene glycol [facility]) packet 17 g  17 g Oral Daily PRN Graeme Lava, DO        acetaminophen (TYLENOL) tablet 650 mg  650 mg Oral Q6H PRN Graeme Lava, DO   650 mg at 04/25/22 6601    Or    acetaminophen (TYLENOL) suppository 650 mg  650 mg Rectal Q6H PRN Graeme Lava, DO        levETIRAcetam (KEPPRA) 500 mg/100 mL IVPB  500 mg IntraVENous Q12H Graeme Lava,  mL/hr at 05/02/22 1252 500 mg at 05/02/22 1252    chlorhexidine (PERIDEX) 0.12 % solution 15 mL  15 mL Mouth/Throat BID Graeme Lava, DO   15 mL at 05/02/22 0820    famotidine (PEPCID) 20 mg in sodium chloride (PF) 10 mL injection  20 mg IntraVENous Daily Graeme Lava, DO   20 mg at 05/02/22 3377    magic butt cream   Topical Q4H PRN Graeme Lava, DO   Given at 04/25/22 0028    magnesium sulfate 1000 mg in dextrose 5% 100 mL IVPB  1,000 mg IntraVENous PRN Patience Walter, DO        potassium bicarb-citric acid (EFFER-K) effervescent tablet 40 mEq  40 mEq Oral PRN Patience Walter, DO        Or    potassium chloride 10 mEq/100 mL IVPB (Peripheral Line)  10 mEq IntraVENous PRN Jovanna Able, DO   Stopped at 04/25/22 0936       Past Medical History:  Past Medical History:   Diagnosis Date    Acute ischemic stroke ([facility] 4/30/2022    Arthritis     Chronic kidney disease, stage 5, kidney failure (HCC)     Closed fracture of right shoulder girdle, with routine healing, subsequent encounter     DM (diabetes mellitus) type II controlled with renal manifestation ([facility] [address] 06/1993    Gastroparesis     GERD (gastroesophageal reflux disease)     Hemodialysis patient ([facility] [address]     dialysis on tues, thur, and sat at [facility]    Hypertension     Hypothyroid     Nasal congestion     recent    Neuropathy     Noncompliance with medications     Palliative care patient 10/08/2019    Restless leg syndrome        Past Surgical History:  Past Surgical History:   Procedure Laterality Date    BREAST SURGERY Left 2008    infected milk gland removed    BREAST SURGERY Right 5/25/2021    WEDGE EXCISION RIGHT BREAST DUCT WITH LACRIMAL DUCT PROBE, PEC BLOCK performed by Sindhu Nichole MD at [facility]      2008    COLONOSCOPY Left 9/17/2020    Dr Isela Burrell hepatic flexure-Severe tortuosity with severe spasming, 1 yr recall    DIALYSIS FISTULA CREATION Left 1/25/2019    REVISION LEFT UPPER EXTREMITY AV ACCESS WITH LEFT BRACHIAL ARTERY TO LEFT AXILLARY VEIN WITH  INTERPOSITIONAL ARTEGRAFT   performed by Gilmer Frias MD at [address]  2012    [address] Right 1/25/2019    INSERTION OF RIGHT INTERNAL JUGULAR HEMODIALYSIS CATHETER performed by Gilmer Frias MD at [address] [facility]  08/17/2018    1.  Moderately increased stainable iron identified by special stain in Kupffer cells and occasional hepatocytes. Negative for evidence of significant portal or lobular inflammation.  Negative for evidence of significant fibrosis    ME COLONOSCOPY W/BIOPSY SINGLE/MULTIPLE N/A 10/20/2017    Dr Von Caruso prep-Tubular AP (-) dysplasia x 2--3 yr recall    MN EGD TRANSORAL BIOPSY SINGLE/MULTIPLE N/A 2016    Dr Heena Trejo EGD TRANSORAL BIOPSY SINGLE/MULTIPLE N/A 10/20/2017    Dr Ernie Diaz (-)    TUBAL LIGATION          VASCULAR SURGERY Left 2016    fistula by Dr Wandy Fink at [address]  10/02/2017    [facility]. Left upper fistulograms/venograms, balloon angioplasty cephalic vein arch 04L37 conquest.    VASCULAR SURGERY  2018    FISTULOGRAM    VASCULAR SURGERY  10/29/2018    [facility]. Left upper fistulograms/venograms    VASCULAR SURGERY  2018    [facility]. Arch aortogram,left upper arteriograms.  VASCULAR SURGERY  2019    [facility]. Removal of tunneled dialyis catheter right internal jugular vein.  VASCULAR SURGERY  2019    [facility]. Left upper extremity fistulogram including venography to the superior vena cava. Balloon angioplasty left subclavian vein with 10mm x 40mm conquest balloon. Balloon angioplasty mid/proximal upper arm cephalic vein with 46IN x 40mm conquest balloon. Venograms after balloon angioplasty. Stent left mid/proximal upper arm cephalic vein stenosis fluency 10mm x 60mm self-expanding covered stent. Balloon     VASCULAR SURGERY  2019    cont angioplasty stent with 10mm x 40mm conquest balloon. Completion venograms left upper extremity.        Family History  Family History   Problem Relation Age of Onset    Colon Cancer Mother          at 63    Colon Polyps Mother     Lung Cancer Father          at 79    Colon Polyps Father     Stomach Cancer Sister     Breast Cancer Sister 27    Depression Daughter 25        committed suicide    Liver Cancer Neg Hx     Liver Disease Neg Hx     Esophageal Cancer Neg Hx     Rectal Cancer Neg Hx        Social History  Social History     Socioeconomic History    Marital status: Single     Spouse name: Not on file    Number of children: 2    Years of education: Not on file    Highest education level: Not on file   Occupational History    Not on file   Tobacco Use    Smoking status: Current Every Day Smoker     Packs/day: 0.50     Years: 33.00     Pack years: 16.50     Types: Cigarettes    Smokeless tobacco: Never Used    Tobacco comment: NOW at 1/4 PD, started at age 24 and has averaged 2 PPD   Vaping Use    Vaping Use: Never used   Substance and Sexual Activity    Alcohol use: No    Drug use: Not Currently     Types: Marijuana Norval Remak)    Sexual activity: Not Currently     Partners: Male   Other Topics Concern    Not on file   Social History Narrative    Not on file     Social Determinants of Health     Financial Resource Strain:     Difficulty of Paying Living Expenses: Not on file   Food Insecurity:     Worried About Running Out of Food in the Last Year: Not on file    Kendy of Food in the Last Year: Not on file   Transportation Needs:     Lack of Transportation (Medical): Not on file    Lack of Transportation (Non-Medical):  Not on file   Physical Activity:     Days of Exercise per Week: Not on file    Minutes of Exercise per Session: Not on file   Stress:     Feeling of Stress : Not on file   Social Connections:     Frequency of Communication with Friends and Family: Not on file    Frequency of Social Gatherings with Friends and Family: Not on file    Attends Baptism Services: Not on file    Active Member of [address] [place] [address] or Organizations: Not on file    Attends Club or Organization Meetings: Not on file    Marital Status: Not on file   Intimate Partner Violence:     Fear of Current or Ex-Partner: Not on file    Emotionally Abused: Not on file    Physically Abused: Not on file    Sexually Abused: Not on file   Housing Stability:     Unable to Pay for Housing in the Last Year: Not on file    Number of [place] in the Last Year: Not on file    Unstable Housing in the Last Year: Not on file         Review of Systems:  History obtained from unobtainable from patient due to mental status          Objective:  Patient Vitals for the past 24 hrs:   BP Temp Temp src Pulse Resp SpO2 Weight   05/02/22 1200 (!) 164/70 97.2 °F (36.2 °C) Temporal 66 15 100 %    05/02/22 1100 136/65   68 14 100 %    05/02/22 1048    67 16 100 %    05/02/22 1000 (!) 143/62   70 16 100 %    05/02/22 0900 (!) 167/64   66 16 100 %    05/02/22 0800 (!) 196/65 97.5 °F (36.4 °C) Temporal 67 16 100 %    05/02/22 0730    66 16 99 %    05/02/22 0700 (!) 178/66   65 16 99 %    05/02/22 0600 (!) 186/71   70 16 99 % 134 lb (60.8 kg)   05/02/22 0500    72 17 99 %    05/02/22 0400 (!) 180/63 98 °F (36.7 °C) Axillary 65 16 100 %    05/02/22 0300 (!) 164/61   62 16 99 %    05/02/22 0200 (!) 176/62   64 16 100 %    05/02/22 0100 (!) 165/59   61 16 100 %    05/02/22 0000 (!) 161/63 97.8 °F (36.6 °C) Temporal 64 9 100 %    05/01/22 2300 (!) 198/73   70 13 100 %    05/01/22 2200 (!) 201/78   72 16 100 %    05/01/22 2100 (!) 184/67   69 16 100 %    05/01/22 2000  98.6 °F (37 °C) Axillary 73 (!) 0 100 %    05/01/22 1900    71 (!) 5 100 %    05/01/22 1800 (!) 191/50   76 16 100 %    05/01/22 1700 (!) 158/60   74 15 100 %    05/01/22 1600 (!) 159/61   74 16 100 %    05/01/22 1500 (!) 152/65   75 16 100 %    05/01/22 1400 (!) 169/69   89 16 100 %    05/01/22 1300 (!) 192/73   93 29 99 %        Intake/Output Summary (Last 24 hours) at 5/2/2022 1253  Last data filed at 5/2/2022 0600  Gross per 24 hour   Intake 1895.34 ml   Output 15 ml   Net 1880.34 ml     General: Ventilated and unable to participate in the history and physical examination  Chest:  clear to auscultation bilaterally  CVS: regular rate and rhythm  Abdominal: soft, nontender, normal bowel sounds  Extremities: no cyanosis or edema  Skin: warm and dry without rash    Labs:  BMP:   Recent Labs     04/30/22  0153 05/01/22  0226 05/02/22  0318   * 132* 134*   K 3.8 3.8 4.2   CL 88* 92* 94*   CO2 18* 26 23   PHOS 4.1 2.6 3.6   BUN 26* 17 32*   CREATININE 3.3* 2.4* 3.5*   CALCIUM 8.9 8.6 8.6     CBC:   Recent Labs     04/30/22  0153 05/01/22 0226 05/02/22 0318   WBC 7.8 6.1 5.7   HGB 9.7* 9.4* 8.9*   HCT 31.5* 30.3* 30.2*   MCV 92.6 91.3 97.7    154 164     LIVER PROFILE:   Recent Labs     04/30/22  0153 05/01/22 0226 05/02/22 0318   AST 13 13 20   ALT 7 6 7   BILITOT 0.4 0.3 <0.2   ALKPHOS 106* 95 101     PT/INR:   Recent Labs     05/01/22  1231 05/02/22 0318   PROTIME 13.7 14.4   INR 1.06 1.13     APTT: No results for input(s): APTT in the last 72 hours. BNP:  No results for input(s): BNP in the last 72 hours. Ionized Calcium:No results for input(s): IONCA in the last 72 hours. Magnesium:  Recent Labs     04/30/22  0153 05/01/22  0226 05/02/22  0318   MG 2.4 2.2 2.6     Phosphorus:  Recent Labs     04/30/22  0153 05/01/22  0226 05/02/22  0318   PHOS 4.1 2.6 3.6     HgbA1C:   Recent Labs     05/01/22  1032   LABA1C 9.4*     Hepatic:   Recent Labs     04/30/22  0153 05/01/22 0226 05/02/22 0318   ALKPHOS 106* 95 101   ALT 7 6 7   AST 13 13 20   PROT 5.9* 6.1* 5.6*   BILITOT 0.4 0.3 <0.2   LABALBU 3.2* 2.9* 2.8*     Lactic Acid: No results for input(s): LACTA in the last 72 hours. Troponin: No results for input(s): CKTOTAL, CKMB, TROPONINT in the last 72 hours. ABGs:   Lab Results   Component Value Date    PHART 7.330 04/24/2022    PO2ART 389.0 04/24/2022    GUF3DLB 41.0 04/24/2022     CRP:  No results for input(s): CRP in the last 72 hours. Sed Rate:  No results for input(s): SEDRATE in the last 72 hours. Culture Results:   Blood Culture Recent:   Recent Labs     04/24/22  1447   BC No growth after 5 days of incubation.        Urine Culture Recent :   Recent Labs     04/24/22  1354   LABURIN No growth at 36-48 hours       Radiology reports as per the Radiologist  Radiology: CT Head WO Contrast    Result Date: 4/24/2022  CT HEAD WO CONTRAST 4/24/2022 2:01 PM HISTORY: Altered mental status COMPARISON: CT scan dated 11/18/2021 DOSE LENGTH PRODUCT: 766 mGy cm TECHNIQUE: Helical tomographic images of the brain were obtained without the use of intravenous contrast. Automated exposure control was also utilized to decrease patient radiation dose. FINDINGS: There is no evidence of evolving large vascular territory infarct. No visualized intra-axial or extra-axial hemorrhage. No mass lesion is identified. Normal size and configuration of the ventricular system. The basal cisterns are symmetric. Posterior fossa structures are unremarkable. The included orbits and their contents are unremarkable. Chronic paranasal sinus disease on the left. The visualized osseous structures and overlying soft tissues of the skull and face are unremarkable. 1. No acute intracranial process. Signed by Dr Greg Burns    Result Date: 4/24/2022  XR CHEST PORTABLE 4/24/2022 4:27 PM HISTORY: ET tube, enteric tube  Technique: Single AP view of the chest COMPARISONS: 4/24/2022 FINDINGS: Endotracheal tube is identified with tip essentially touching the krysta. Enteric tube courses into the stomach with tip curled in the fundus back towards the gastroesophageal junction. The lungs are clear and well expanded. Heart size is stable. Pulmonary vasculature are nondilated. No pleural effusion or pneumothorax. 1. Endotracheal tube is deep, tip essentially touching the krysta. Lungs are clear and well expanded. I would recommend 2-3 cm withdrawal for a more optimal positioning. The results of this exam were discussed with Jerica Aquino (ICU nursing) on 4/24/2022 at 1640 hours Signed by Dr Juju Fierro    XR CHEST PORTABLE    Result Date: 4/24/2022  XR CHEST PORTABLE 4/24/2022 1:17 PM HISTORY: ET tube placement  Technique: Single AP view of the chest COMPARISONS: 4/24/2022 FINDINGS: Status post endotracheal intubation. ET tube is in good position. Lungs are well-expanded.  Enteric tube curls on itself in the midesophagus and then extends back up into the throat. Heart size is stable. Pulmonary vasculature are nondilated. 1. ET tube is in good position. Lungs are clear and well expanded. 2. Enteric tube curls on itself in the mid esophagus before extending back up into the throat. This will need repositioned. The results of this exam were discussed with Dr. Boston Rodriguez on 4/24/2022 at 1329 hours Signed by Dr Kevin Lopez    XR CHEST PORTABLE    Result Date: 4/24/2022  XR CHEST PORTABLE 4/24/2022 12:34 PM HISTORY: Altered mental status  Technique: Single AP view of the chest COMPARISONS: Chest exam dated 4/1/2022 FINDINGS: No lung consolidation. No pleural effusion or pneumothorax. Cardiomediastinal silhouette and pulmonary vasculature are unremarkable. No acute bony abnormality. Left subclavian region vascular stent with additional stent projecting over the left humerus. No acute cardiopulmonary process. Signed by Dr Kevin Lopez    VL DUP CAROTID BILATERAL    Result Date: 4/29/2022  Vascular Carotid Procedure  Demographics   Patient Name    Ravi Alcaraz Age                   47   Patient Number  [id number]       Gender                Female   Visit Number    [id number]    Interpreting          Kena Everett                               Physician   Date of Birth   1970   Referring Physician   [facility]   Accession       [id number]   [address], [place], [address]  Number  Procedure Type of Study:   Cerebral:Carotid, VL CAROTID BILATERAL. Indications for Study:Stroke. Risk Factors   - The patient's risk factor(s) include: diabetes mellitus and arterial     hypertension.   - Current - Every day. Allergies   - Penicillins.   - Stadol.   - Other allergy:(Lamisil Advanced). Technical Quality:Limited visualization due to patient on ventilator. Impression   There is mixed plaque visualized in the bilateral internal carotid  artery(ies). There is less than 50% stenosis in the right internal carotid artery. There is less than 50% stenosis of the left internal carotid artery. There is normal antegrade flow in the bilateral vertebral artery(ies). right side is very limited due to positioning, her chin is resting on her  right clavicle and she is vented. Signature   ----------------------------------------------------------------  Electronically signed by Kelsie AmbrizInterpreting  physician) on 04/29/2022 03:20 PM  ----------------------------------------------------------------  Blood Pressure:Right arm 178/69 mmHg. Velocities are measured in cm/s ; Diameters are measured in mm Carotid Right Measurements +------------+-------+-------+--------+-------+------------+---------------+ ! Location    ! PSV    ! EDV    ! Angle   ! RI     !%Stenosis   ! Tortuosity     ! +------------+-------+-------+--------+-------+------------+---------------+ ! Prox CCA    !47.2   !       !60      !       !            !               ! +------------+-------+-------+--------+-------+------------+---------------+ ! Mid CCA     !47.2   !       !60      !       !            !               ! +------------+-------+-------+--------+-------+------------+---------------+ ! Prox ICA    !41     !7.46   !60      !0.82   !            !               ! +------------+-------+-------+--------+-------+------------+---------------+ ! Mid ICA     !62.7   !11.2   !60      !0.82   !            !               ! +------------+-------+-------+--------+-------+------------+---------------+ ! Dist ICA    !75.2   !13.7   !60      !0.82   !            !               ! +------------+-------+-------+--------+-------+------------+---------------+ ! Prox ECA    !147    !       !61      !       !            !               ! +------------+-------+-------+--------+-------+------------+---------------+ ! Vertebral   !47.2   !13.7   !60      !0.71   !            !               ! +------------+-------+-------+--------+-------+------------+---------------+   - There is antegrade vertebral flow noted on the right side. - Additional Measurements:ICAPSV/CCAPSV 1.59. Carotid Left Measurements +------------+-------+-------+--------+-------+------------+---------------+ ! Location    ! PSV    ! EDV    ! Angle   ! RI     !%Stenosis   ! Tortuosity     ! +------------+-------+-------+--------+-------+------------+---------------+ ! Prox CCA    !81.4   !9.94   !60      !0.88   !            !               ! +------------+-------+-------+--------+-------+------------+---------------+ ! Mid CCA     !70.2   !7.46   !60      !0.89   !            !               ! +------------+-------+-------+--------+-------+------------+---------------+ ! Prox ICA    !69.4   !11.9   !60      !0.83   !            !               ! +------------+-------+-------+--------+-------+------------+---------------+ ! Mid ICA     !80.6   !19.6   !60      !0.76   !            !               ! +------------+-------+-------+--------+-------+------------+---------------+ ! Dist ICA    !70.3   !12.5   !50      !0.82   !            !               ! +------------+-------+-------+--------+-------+------------+---------------+ ! Prox ECA    !113    !       !61      !       !            !               ! +------------+-------+-------+--------+-------+------------+---------------+ ! Vertebral   !38.8   !       !60      !       !            !               ! +------------+-------+-------+--------+-------+------------+---------------+   - There is antegrade vertebral flow noted on the left side. - Additional Measurements:ICAPSV/CCAPSV 0.99. ICAEDV/CCAEDV 1.97. MRI BRAIN WO CONTRAST    Result Date: 4/29/2022  Examination. MRI BRAIN WO CONTRAST 4/29/2022 11:43 AM History: Seizure activity. The multiplanar, multisequence MR imaging of the brain is performed without contrast enhancement. There is no previous similar study for comparison. The correlation made with CT scan of the head dated 4/24/2022.  The diffusion-weighted imaging sequence including the ADC map show an area of acute infarction involving the left posterior frontal, parietal and basal occipital lobe, the left middle cerebral artery territory. A small linear focus of T2 signal in the right posterior paraventricular white matter has no corresponding ADC map changes and may represent a subacute or chronic process. The targeted images of the mesial temporal lobe show moderate symmetrical mesial temporal atrophy without presence of any abnormal signal. This may be a process of general volume loss/atrophy. Moderately dilated ventricles, basal cisterns and the cortical sulci suggestive chronic volume loss/atrophy. The orbits are suboptimally evaluated. No obvious abnormality. Limited visualized optic nerve, optic chiasma and optic tracts are normal and symmetrical. The pituitary gland appears normal. The corpus callosum, the brainstem and cerebellum are normal. The FLAIR sequence images show significant periventricular T2 signal changes representing chronic white matter ischemia and transependymal CSF flow. The gradient recalled imaging sequence show normal flow void in the visualized intracranial vessels, particularly normal flow in the limited visualized left middle cerebral artery. There is mucosal thickening involving the ethmoid sinuses. The bilateral mastoid effusion. 1. Acute left middle cerebral territory infarct. 2. Chronic ischemic and atrophic changes. 3. The symmetrical mesial temporal atrophy is probably a part of the generalized volume loss. 4. Ethmoid sinusitis. Bilateral mastoid effusion. The above report was immediately called to the nurse in charge of the patient in CCU.  Signed by Dr Jeremias Limon    Result Date: 4/30/2022  Transthoracic Echocardiography Report (TTE)  Demographics   Patient Name  [facility] Date of Study          04/29/2022   MRN           [id number]       Gender                 Female   Date of Birth 1970   Room Number            FGE-0561   Age           47 year(s)   Height:       66 inches    Referring Physician    Jayda Amos   Weight:       144 pounds   Sonographer            Jessie Madera RDCS   BSA:          1.74 m^2     Interpreting Physician Deb Klein MD   BMI:          23.24 kg/m^2  Procedure Type of Study   TTE procedure:ECHO NO CONTRAST WITH DOP/COLR. Study Location: Portable Technical Quality: Adequate visualization Patient Status: Inpatient Contrast Medium: Bubble Study. Rhythm: Within normal limits HR: 57 bpm BP: 178/69 mmHg Indications:Stroke. Conclusions   Summary  Mitral valve leaflets are mildly thickened with preserved leaflet  mobility. Mild aortic stenosis. Mean gradient 14 mm hg  Tricuspid valve is structurally normal.  Mild tricuspid regurgitation. Moderately dilated left atrium. Normal intact intra-atrial septum was noted with no evidence of  significant intra-atrial communications by color flow Doppler or by  agitated saline study. Normal left ventricular size with preserved LV function and an estimated  ejection fraction of approximately 55-60%. Moderate concentric left ventricular hypertrophy. No regional wall motion abnormalities. Grade II diastolic dysfunction   Signature   ----------------------------------------------------------------  Electronically signed by Deb Klein MD(Interpreting  physician) on 04/30/2022 11:02 AM  ----------------------------------------------------------------   Findings   Mitral Valve  Mitral valve leaflets are mildly thickened with preserved leaflet  mobility. Aortic Valve  Mild aortic stenosis. Mean gradient 14 mm hg   Tricuspid Valve  Tricuspid valve is structurally normal.  Mild tricuspid regurgitation. Pulmonic Valve  The pulmonic valve was not well visualized . Left Atrium  Moderately dilated left atrium.   Normal intact intra-atrial septum was noted with no evidence of  significant intra-atrial communications by color flow Doppler or by  agitated saline study. Left Ventricle  Normal left ventricular size with preserved LV function and an estimated  ejection fraction of approximately 55-60%. Moderate concentric left ventricular hypertrophy. No regional wall motion abnormalities. Grade II diastolic dysfunction   Right Atrium  Normal right atrial dimension with no evidence of thrombus or mass noted. Right Ventricle  Normal right ventricular size with preserved RV function. Pericardial Effusion  No evidence of significant pericardial effusion is noted. Pleural Effusion  No evidence of pleural effusion. Allergies   - Penicillins.   - Stadol.   - Other allergy:(Lamisil Advanced). 2D Measurements and Calculations(cm)   LVIDd: 4.4 cm                       LVIDs: 3.14 cm  IVSd: 1.41 cm  LVPWd: 1.43 cm                      AO Root Dimension: 2.4 cm  Rt. Vent.  Dimension: 2.64 cm        LA Dimension: 3.8 cm  % Ejection Fraction: 55 %           LA Area: 24.3 cm^2  LA Volume: 86.1 ml                  LV Systolic dimension: 3.67BE  LA Volume Index: 49 ml/m^2          LV PW diastolic: 4.47QI  LV dimension: 4.4 cm                LVOT diameter: 2 cm                                      RV Diastolic dimension: 9.39UH  LVEDV: 115 ml                       LVEDVI: 66 ml/m^2  LVESV:36.3 ml                       LVESVI: 21 ml/m^2  Cardic Output (CO): 5.53l/min  Doppler Measurements and Calculations   AV Peak Velocity:257 cm/s              MV Peak E-Wave: 82.3 cm/s  AV Mean Velocity:178 cm/s              MV Peak A-Wave: 76.7 cm/s  AV Peak Gradient: 26.42 mmHg           MV E/A Ratio: 1.07 %  AV Mean Gradient: 14 mmHg              MV Mean velocity: 64.8cm/s  AV Area (Continuity):1.85 cm^2         MV Peak Gradient: 2.71 mmHg  AV VTI:52.4 cm/s                       MV Mean gradient: 2 mmHg  TR Velocity:207 cm/s  TR Gradient:17.14 mmHg   MV E' septal velocity: 5.66cm/s  MV E' lateral velocity:5.66 cm/s         Assessment     ESRD  DM2 with nephropathy on long-term insulin, uncontrolled  HTN  Hyponatremia  Hypokalemia  Secondary Hyperparathyroidism  Anemia CKD  Seizure disorder  Hypothyroidism       Plan:  Dialysis per routine scheduling on ultrafiltration as tolerated  Wean ventilator per primary  Monitor labs  Neuro evaluation    Samir Fischer MD  05/02/22  12:53 PM

## 2022-05-02 NOTE — PROGRESS NOTES
15:00- Spontaneous breathing trial  Respirations stayed 25-27, tidal volume 300, SBI 90's   Placed back on previous settings     Electronically signed by Kandy Mcburney, RN on 5/2/2022 at 3:51 PM

## 2022-05-03 ENCOUNTER — APPOINTMENT (OUTPATIENT)
Dept: GENERAL RADIOLOGY | Age: 52
DRG: 004 | End: 2022-05-03
Payer: MEDICARE

## 2022-05-03 LAB
ALBUMIN SERPL-MCNC: 3.1 G/DL (ref 3.5–5.2)
ALP BLD-CCNC: 107 U/L (ref 35–104)
ALT SERPL-CCNC: 8 U/L (ref 5–33)
ANION GAP SERPL CALCULATED.3IONS-SCNC: 16 MMOL/L (ref 7–19)
AST SERPL-CCNC: 18 U/L (ref 5–32)
BASE EXCESS ARTERIAL: 5.4 MMOL/L (ref -2–2)
BASOPHILS ABSOLUTE: 0.1 K/UL (ref 0–0.2)
BASOPHILS RELATIVE PERCENT: 0.9 % (ref 0–1)
BILIRUB SERPL-MCNC: 0.3 MG/DL (ref 0.2–1.2)
BUN BLDV-MCNC: 28 MG/DL (ref 6–20)
CALCIUM SERPL-MCNC: 8.8 MG/DL (ref 8.6–10)
CARBOXYHEMOGLOBIN ARTERIAL: 1.2 % (ref 0–5)
CHLORIDE BLD-SCNC: 92 MMOL/L (ref 98–111)
CO2: 28 MMOL/L (ref 22–29)
CREAT SERPL-MCNC: 2.8 MG/DL (ref 0.5–0.9)
EOSINOPHILS ABSOLUTE: 0.1 K/UL (ref 0–0.6)
EOSINOPHILS RELATIVE PERCENT: 1.9 % (ref 0–5)
GFR AFRICAN AMERICAN: 21
GFR NON-AFRICAN AMERICAN: 18
GLUCOSE BLD-MCNC: 218 MG/DL (ref 74–109)
GLUCOSE BLD-MCNC: 317 MG/DL (ref 70–99)
GLUCOSE BLD-MCNC: 321 MG/DL (ref 70–99)
GLUCOSE BLD-MCNC: 329 MG/DL (ref 70–99)
GLUCOSE BLD-MCNC: 336 MG/DL (ref 70–99)
HCO3 ARTERIAL: 28.7 MMOL/L (ref 22–26)
HCT VFR BLD CALC: 28.4 % (ref 37–47)
HEMOGLOBIN, ART, EXTENDED: 9.7 G/DL (ref 12–16)
HEMOGLOBIN: 8.8 G/DL (ref 12–16)
IMMATURE GRANULOCYTES #: 0 K/UL
INR BLD: 1.04 (ref 0.88–1.18)
LYMPHOCYTES ABSOLUTE: 1 K/UL (ref 1.1–4.5)
LYMPHOCYTES RELATIVE PERCENT: 16.3 % (ref 20–40)
MAGNESIUM: 2.3 MG/DL (ref 1.6–2.6)
MCH RBC QN AUTO: 28.7 PG (ref 27–31)
MCHC RBC AUTO-ENTMCNC: 31 G/DL (ref 33–37)
MCV RBC AUTO: 92.5 FL (ref 81–99)
METHEMOGLOBIN ARTERIAL: 1.4 %
MONOCYTES ABSOLUTE: 0.5 K/UL (ref 0–0.9)
MONOCYTES RELATIVE PERCENT: 9.1 % (ref 0–10)
NEUTROPHILS ABSOLUTE: 4.2 K/UL (ref 1.5–7.5)
NEUTROPHILS RELATIVE PERCENT: 71.6 % (ref 50–65)
O2 CONTENT ARTERIAL: 13.2 ML/DL
O2 SAT, ARTERIAL: 95.4 %
O2 THERAPY: ABNORMAL
PCO2 ARTERIAL: 36 MMHG (ref 35–45)
PDW BLD-RTO: 18.4 % (ref 11.5–14.5)
PERFORMED ON: ABNORMAL
PH ARTERIAL: 7.51 (ref 7.35–7.45)
PHOSPHORUS: 3.2 MG/DL (ref 2.5–4.5)
PLATELET # BLD: 184 K/UL (ref 130–400)
PMV BLD AUTO: 10.1 FL (ref 9.4–12.3)
PO2 ARTERIAL: 96 MMHG (ref 80–100)
POTASSIUM SERPL-SCNC: 3.8 MMOL/L (ref 3.5–5)
POTASSIUM, WHOLE BLOOD: 4
PROTHROMBIN TIME: 13.6 SEC (ref 12–14.6)
RBC # BLD: 3.07 M/UL (ref 4.2–5.4)
SODIUM BLD-SCNC: 136 MMOL/L (ref 136–145)
TOTAL PROTEIN: 6 G/DL (ref 6.6–8.7)
WBC # BLD: 5.8 K/UL (ref 4.8–10.8)

## 2022-05-03 PROCEDURE — 85025 COMPLETE CBC W/AUTO DIFF WBC: CPT

## 2022-05-03 PROCEDURE — 6370000000 HC RX 637 (ALT 250 FOR IP): Performed by: INTERNAL MEDICINE

## 2022-05-03 PROCEDURE — 85610 PROTHROMBIN TIME: CPT

## 2022-05-03 PROCEDURE — 2500000003 HC RX 250 WO HCPCS: Performed by: INTERNAL MEDICINE

## 2022-05-03 PROCEDURE — 99233 SBSQ HOSP IP/OBS HIGH 50: CPT

## 2022-05-03 PROCEDURE — 94003 VENT MGMT INPAT SUBQ DAY: CPT

## 2022-05-03 PROCEDURE — 82803 BLOOD GASES ANY COMBINATION: CPT

## 2022-05-03 PROCEDURE — 2100000000 HC CCU R&B

## 2022-05-03 PROCEDURE — 84100 ASSAY OF PHOSPHORUS: CPT

## 2022-05-03 PROCEDURE — 83735 ASSAY OF MAGNESIUM: CPT

## 2022-05-03 PROCEDURE — 36415 COLL VENOUS BLD VENIPUNCTURE: CPT

## 2022-05-03 PROCEDURE — 82947 ASSAY GLUCOSE BLOOD QUANT: CPT

## 2022-05-03 PROCEDURE — 6360000002 HC RX W HCPCS: Performed by: INTERNAL MEDICINE

## 2022-05-03 PROCEDURE — 99233 SBSQ HOSP IP/OBS HIGH 50: CPT | Performed by: PSYCHIATRY & NEUROLOGY

## 2022-05-03 PROCEDURE — 2700000000 HC OXYGEN THERAPY PER DAY

## 2022-05-03 PROCEDURE — 71045 X-RAY EXAM CHEST 1 VIEW: CPT

## 2022-05-03 PROCEDURE — 6360000002 HC RX W HCPCS

## 2022-05-03 PROCEDURE — 94640 AIRWAY INHALATION TREATMENT: CPT

## 2022-05-03 PROCEDURE — 80053 COMPREHEN METABOLIC PANEL: CPT

## 2022-05-03 PROCEDURE — 2580000003 HC RX 258: Performed by: INTERNAL MEDICINE

## 2022-05-03 PROCEDURE — 31500 INSERT EMERGENCY AIRWAY: CPT

## 2022-05-03 PROCEDURE — 36600 WITHDRAWAL OF ARTERIAL BLOOD: CPT

## 2022-05-03 RX ORDER — PROPOFOL 10 MG/ML
5-50 INJECTION, EMULSION INTRAVENOUS CONTINUOUS
Status: DISCONTINUED | OUTPATIENT
Start: 2022-05-03 | End: 2022-05-22

## 2022-05-03 RX ORDER — INSULIN GLARGINE 100 [IU]/ML
8 INJECTION, SOLUTION SUBCUTANEOUS NIGHTLY
Status: DISCONTINUED | OUTPATIENT
Start: 2022-05-03 | End: 2022-05-04

## 2022-05-03 RX ORDER — SODIUM CHLORIDE FOR INHALATION 0.9 %
3 VIAL, NEBULIZER (ML) INHALATION EVERY 4 HOURS PRN
Status: DISCONTINUED | OUTPATIENT
Start: 2022-05-03 | End: 2022-05-25 | Stop reason: HOSPADM

## 2022-05-03 RX ORDER — HYDRALAZINE HYDROCHLORIDE 20 MG/ML
10 INJECTION INTRAMUSCULAR; INTRAVENOUS EVERY 6 HOURS PRN
Status: DISCONTINUED | OUTPATIENT
Start: 2022-05-03 | End: 2022-05-25 | Stop reason: HOSPADM

## 2022-05-03 RX ORDER — PROPOFOL 10 MG/ML
INJECTION, EMULSION INTRAVENOUS
Status: COMPLETED
Start: 2022-05-03 | End: 2022-05-03

## 2022-05-03 RX ORDER — METHYLPREDNISOLONE SODIUM SUCCINATE 40 MG/ML
40 INJECTION, POWDER, LYOPHILIZED, FOR SOLUTION INTRAMUSCULAR; INTRAVENOUS EVERY 8 HOURS
Status: DISCONTINUED | OUTPATIENT
Start: 2022-05-03 | End: 2022-05-08

## 2022-05-03 RX ORDER — METHYLPREDNISOLONE SODIUM SUCCINATE 125 MG/2ML
125 INJECTION, POWDER, LYOPHILIZED, FOR SOLUTION INTRAMUSCULAR; INTRAVENOUS ONCE
Status: COMPLETED | OUTPATIENT
Start: 2022-05-03 | End: 2022-05-03

## 2022-05-03 RX ORDER — ASPIRIN 81 MG/1
81 TABLET, CHEWABLE ORAL DAILY
Status: DISCONTINUED | OUTPATIENT
Start: 2022-05-03 | End: 2022-05-25 | Stop reason: HOSPADM

## 2022-05-03 RX ADMIN — DEXMEDETOMIDINE HYDROCHLORIDE 0.4 MCG/KG/HR: 4 INJECTION, SOLUTION INTRAVENOUS at 10:32

## 2022-05-03 RX ADMIN — VANCOMYCIN HYDROCHLORIDE 125 MG: 1 INJECTION, POWDER, LYOPHILIZED, FOR SOLUTION INTRAVENOUS at 17:41

## 2022-05-03 RX ADMIN — Medication: at 09:40

## 2022-05-03 RX ADMIN — ANORECTAL OINTMENT: 15.7; .44; 24; 20.6 OINTMENT TOPICAL at 05:14

## 2022-05-03 RX ADMIN — PROPOFOL 20 MCG/KG/MIN: 10 INJECTION, EMULSION INTRAVENOUS at 15:00

## 2022-05-03 RX ADMIN — ASPIRIN 81 MG 81 MG: 81 TABLET ORAL at 12:24

## 2022-05-03 RX ADMIN — HEPARIN SODIUM 5000 UNITS: 5000 INJECTION INTRAVENOUS; SUBCUTANEOUS at 08:49

## 2022-05-03 RX ADMIN — ANORECTAL OINTMENT: 15.7; .44; 24; 20.6 OINTMENT TOPICAL at 17:42

## 2022-05-03 RX ADMIN — VANCOMYCIN HYDROCHLORIDE 125 MG: 1 INJECTION, POWDER, LYOPHILIZED, FOR SOLUTION INTRAVENOUS at 12:24

## 2022-05-03 RX ADMIN — SODIUM CHLORIDE, PRESERVATIVE FREE 20 MG: 5 INJECTION INTRAVENOUS at 08:48

## 2022-05-03 RX ADMIN — HEPARIN SODIUM 5000 UNITS: 5000 INJECTION INTRAVENOUS; SUBCUTANEOUS at 20:23

## 2022-05-03 RX ADMIN — METHYLPREDNISOLONE SODIUM SUCCINATE 40 MG: 40 INJECTION, POWDER, FOR SOLUTION INTRAMUSCULAR; INTRAVENOUS at 20:23

## 2022-05-03 RX ADMIN — INSULIN LISPRO 8 UNITS: 100 INJECTION, SOLUTION INTRAVENOUS; SUBCUTANEOUS at 12:36

## 2022-05-03 RX ADMIN — METHYLPREDNISOLONE SODIUM SUCCINATE 125 MG: 125 INJECTION, POWDER, FOR SOLUTION INTRAMUSCULAR; INTRAVENOUS at 14:04

## 2022-05-03 RX ADMIN — HYDRALAZINE HYDROCHLORIDE 100 MG: 50 TABLET, FILM COATED ORAL at 05:14

## 2022-05-03 RX ADMIN — PROPOFOL 35 MCG/KG/MIN: 10 INJECTION, EMULSION INTRAVENOUS at 20:10

## 2022-05-03 RX ADMIN — DEXMEDETOMIDINE HYDROCHLORIDE 0.8 MCG/KG/HR: 4 INJECTION, SOLUTION INTRAVENOUS at 05:21

## 2022-05-03 RX ADMIN — RACEPINEPHRINE HYDROCHLORIDE 11.25 MG: 11.25 SOLUTION RESPIRATORY (INHALATION) at 14:08

## 2022-05-03 RX ADMIN — LEVETIRACETAM 500 MG: 5 INJECTION INTRAVENOUS at 00:15

## 2022-05-03 RX ADMIN — HYDRALAZINE HYDROCHLORIDE 100 MG: 50 TABLET, FILM COATED ORAL at 20:23

## 2022-05-03 RX ADMIN — CHLORHEXIDINE GLUCONATE 15 ML: 1.2 RINSE ORAL at 20:11

## 2022-05-03 RX ADMIN — CHLORHEXIDINE GLUCONATE 15 ML: 1.2 RINSE ORAL at 09:39

## 2022-05-03 RX ADMIN — HEPARIN SODIUM 5000 UNITS: 5000 INJECTION INTRAVENOUS; SUBCUTANEOUS at 15:46

## 2022-05-03 RX ADMIN — Medication 10 ML: at 20:23

## 2022-05-03 RX ADMIN — INSULIN GLARGINE 8 UNITS: 100 INJECTION, SOLUTION SUBCUTANEOUS at 20:26

## 2022-05-03 RX ADMIN — DEXMEDETOMIDINE HYDROCHLORIDE 0.2 MCG/KG/HR: 4 INJECTION, SOLUTION INTRAVENOUS at 11:21

## 2022-05-03 RX ADMIN — HYDRALAZINE HYDROCHLORIDE 10 MG: 20 INJECTION INTRAMUSCULAR; INTRAVENOUS at 14:34

## 2022-05-03 RX ADMIN — HYDRALAZINE HYDROCHLORIDE 10 MG: 20 INJECTION INTRAMUSCULAR; INTRAVENOUS at 09:31

## 2022-05-03 RX ADMIN — INSULIN LISPRO 8 UNITS: 100 INJECTION, SOLUTION INTRAVENOUS; SUBCUTANEOUS at 17:41

## 2022-05-03 RX ADMIN — VANCOMYCIN HYDROCHLORIDE 125 MG: 1 INJECTION, POWDER, LYOPHILIZED, FOR SOLUTION INTRAVENOUS at 05:14

## 2022-05-03 RX ADMIN — LEVETIRACETAM 500 MG: 5 INJECTION INTRAVENOUS at 12:46

## 2022-05-03 RX ADMIN — INSULIN LISPRO 8 UNITS: 100 INJECTION, SOLUTION INTRAVENOUS; SUBCUTANEOUS at 08:49

## 2022-05-03 RX ADMIN — Medication 10 ML: at 10:00

## 2022-05-03 RX ADMIN — INSULIN LISPRO 4 UNITS: 100 INJECTION, SOLUTION INTRAVENOUS; SUBCUTANEOUS at 20:26

## 2022-05-03 ASSESSMENT — PAIN SCALES - GENERAL
PAINLEVEL_OUTOF10: 0
PAINLEVEL_OUTOF10: 0

## 2022-05-03 ASSESSMENT — PULMONARY FUNCTION TESTS
PIF_VALUE: 25
PIF_VALUE: 17
PIF_VALUE: 15
PIF_VALUE: 23
PIF_VALUE: 24
PIF_VALUE: 15
PIF_VALUE: 18
PIF_VALUE: 17
PIF_VALUE: 24
PIF_VALUE: 19
PIF_VALUE: 31
PIF_VALUE: 24
PIF_VALUE: 18
PIF_VALUE: 25
PIF_VALUE: 18
PIF_VALUE: 22
PIF_VALUE: 18
PIF_VALUE: 23
PIF_VALUE: 25
PIF_VALUE: 15
PIF_VALUE: 29
PIF_VALUE: 18
PIF_VALUE: 21
PIF_VALUE: 34
PIF_VALUE: 27
PIF_VALUE: 25
PIF_VALUE: 18
PIF_VALUE: 18
PIF_VALUE: 15
PIF_VALUE: 18
PIF_VALUE: 16
PIF_VALUE: 15
PIF_VALUE: 25
PIF_VALUE: 23
PIF_VALUE: 27
PIF_VALUE: 72
PIF_VALUE: 15
PIF_VALUE: 25
PIF_VALUE: 15
PIF_VALUE: 18
PIF_VALUE: 26
PIF_VALUE: 19
PIF_VALUE: 0
PIF_VALUE: 18
PIF_VALUE: 23
PIF_VALUE: 28
PIF_VALUE: 24
PIF_VALUE: 15
PIF_VALUE: 22
PIF_VALUE: 20
PIF_VALUE: 19
PIF_VALUE: 18
PIF_VALUE: 26
PIF_VALUE: 24
PIF_VALUE: 23

## 2022-05-03 NOTE — PROGRESS NOTES
[facility] Progress Note      Patient:   Mitesh Mei  MR#:    [id number]   Room:    02/702-   YOB: 1970  Date of Progress Note: 5/3/2022  Time of Note                           12:30 PM  Consulting Physician:  Andreina Blanco DO  Attending Physician:  Kay Singh MD      INTERVAL HISTORY:  No acute events, remains intubated, more responsive, localizing to pain, less withdrawal to pain over the the right side - unchanged. REVIEW OF SYSTEMS:  Unable to obtain     PHYSICAL EXAM:    Constitutional    BP (!) 206/55   Pulse 83   Temp 97.6 °F (36.4 °C) (Temporal)   Resp 17   Ht 5' 6\" (1.676 m)   Wt 131 lb 14.4 oz (59.8 kg)   SpO2 100%   BMI 21.29 kg/m²   General appearance: Intubated, sedated   EYES -   Conjunctiva normal  Pupillary exam as below, see CN exam in the neurologic exam  ENT-    No scars, masses, or lesions over external nose or ears  Oropharynx - intubated  Cardiovascular -   No clubbing, cyanosis  Pulmonary-   Mechanically ventilated   Musculoskeletal    No significant wasting of muscles noted  Gait as below, see gait exam in the neurologic exam  Muscle strength, tone, stability as below see the motor exam in the neurologic exam.   No bony deformities  Skin    Warm, dry, and intact to inspection and palpation. No rash, erythema, or pallor        NEUROLOGICAL EXAM     Mental status    []? Awake, alert, oriented   []? Affect attention and concentration appear appropriate  []? Recent and remote memory appears unremarkable  []? Speech normal without dysarthria or aphasia, comprehension and repetition intact. COMMENTS:  Unresponsive to voice, not following commands, does appear to localize to pain    Cranial Nerves []? No VF deficit to confrontation,  optic discs normal, no papilledema on fundoscopic exam.  []? PERRLA, EOMI, no nystagmus, conjugate eye movements, no ptosis  []? Face symmetric  []? Facial sensation intact  []?  Tongue midline no atrophy or fasciculations present  []? Palate midline, hearing to finger rub normal  []? Shoulder shrug and SCM testing normal  COMMENTS:  PERRL, Face appears sym, OCR present   Motor   []? 5/5 strength x 4 extremities  [x]? Normal bulk and tone  [x]? No tremor present  []? No rigidity or bradykinesia noted  COMMENTS: Withdrawal x 4, less over RUE   Sensory  []? Sensation intact to light touch, pin prick, vibration, and proprioception BLE  []? Sensation intact to light touch, pin prick, vibration, and proprioception BUE  COMMENTS: Limited    Coordination []? FTN normal bilaterally   []? HTS normal bilaterally  []? SANFORD normal.   COMMENTS: Limited    Reflexes  [x]? Symmetric and non-pathological  [x]? Toes downgoing bilaterally  [x]? No clonus present  COMMENTS:   Gait                  []? Normal steady gait    []? Ataxic    []? Spastic     []? Magnetic     []? Shuffling  [x]? Not assessed  COMMENTS:        LABS/IMAGING:    CT Head WO Contrast    Result Date: 4/24/2022  CT HEAD WO CONTRAST 4/24/2022 2:01 PM HISTORY: Altered mental status COMPARISON: CT scan dated 11/18/2021 DOSE LENGTH PRODUCT: 766 mGy cm TECHNIQUE: Helical tomographic images of the brain were obtained without the use of intravenous contrast. Automated exposure control was also utilized to decrease patient radiation dose. FINDINGS: There is no evidence of evolving large vascular territory infarct. No visualized intra-axial or extra-axial hemorrhage. No mass lesion is identified. Normal size and configuration of the ventricular system. The basal cisterns are symmetric. Posterior fossa structures are unremarkable. The included orbits and their contents are unremarkable. Chronic paranasal sinus disease on the left. The visualized osseous structures and overlying soft tissues of the skull and face are unremarkable. 1. No acute intracranial process.  Signed by Dr Lizeth Ricci    XR CHEST PORTABLE    Result Date: 4/24/2022  XR CHEST PORTABLE 4/24/2022 4:27 PM HISTORY: ET tube, enteric tube  Technique: Single AP view of the chest COMPARISONS: 4/24/2022 FINDINGS: Endotracheal tube is identified with tip essentially touching the krysta. Enteric tube courses into the stomach with tip curled in the fundus back towards the gastroesophageal junction. The lungs are clear and well expanded. Heart size is stable. Pulmonary vasculature are nondilated. No pleural effusion or pneumothorax. 1. Endotracheal tube is deep, tip essentially touching the krysta. Lungs are clear and well expanded. I would recommend 2-3 cm withdrawal for a more optimal positioning. The results of this exam were discussed with Justina Gonzalez (ICU nursing) on 4/24/2022 at 1640 hours Signed by Dr Milli Aguilar    XR CHEST PORTABLE    Result Date: 4/24/2022  XR CHEST PORTABLE 4/24/2022 1:17 PM HISTORY: ET tube placement  Technique: Single AP view of the chest COMPARISONS: 4/24/2022 FINDINGS: Status post endotracheal intubation. ET tube is in good position. Lungs are well-expanded. Enteric tube curls on itself in the midesophagus and then extends back up into the throat. Heart size is stable. Pulmonary vasculature are nondilated. 1. ET tube is in good position. Lungs are clear and well expanded. 2. Enteric tube curls on itself in the mid esophagus before extending back up into the throat. This will need repositioned. The results of this exam were discussed with Dr. JUÁREZ [facility] on 4/24/2022 at 1329 hours Signed by Dr Milli Aguilar    XR CHEST PORTABLE    Result Date: 4/24/2022  XR CHEST PORTABLE 4/24/2022 12:34 PM HISTORY: Altered mental status  Technique: Single AP view of the chest COMPARISONS: Chest exam dated 4/1/2022 FINDINGS: No lung consolidation. No pleural effusion or pneumothorax. Cardiomediastinal silhouette and pulmonary vasculature are unremarkable. No acute bony abnormality. Left subclavian region vascular stent with additional stent projecting over the left humerus. No acute cardiopulmonary process. Signed by Dr Kajal Chan      Lab Results   Component Value Date    WBC 5.8 05/03/2022    HGB 8.8 (L) 05/03/2022    HCT 28.4 (L) 05/03/2022    MCV 92.5 05/03/2022     05/03/2022     Lab Results   Component Value Date     05/03/2022    K 3.8 05/03/2022    CL 92 (L) 05/03/2022    CO2 28 05/03/2022    BUN 28 (H) 05/03/2022    CREATININE 2.8 (H) 05/03/2022    GLUCOSE 218 (H) 05/03/2022    CALCIUM 8.8 05/03/2022    PROT 6.0 (L) 05/03/2022    LABALBU 3.1 (L) 05/03/2022    BILITOT 0.3 05/03/2022    ALKPHOS 107 (H) 05/03/2022    AST 18 05/03/2022    ALT 8 05/03/2022    LABGLOM 18 (A) 05/03/2022    GFRAA 21 (L) 05/03/2022     Lab Results   Component Value Date    INR 1.04 05/03/2022    INR 1.13 05/02/2022    INR 1.06 05/01/2022    PROTIME 13.6 05/03/2022    PROTIME 14.4 05/02/2022    PROTIME 13.7 05/01/2022       RECORD REVIEW:   Previous medical records, medications were reviewed at today's visit. Nursing/physician notes, imaging, labs and vitals reviewed. ASSESSMENT:  47 y.o. admitted after an episode of unresponsiveness followed by generalized tonic-clonic event in the emergency department. She had profoundly elevated glucose, possible hyperglycemic coma, hyponatremia, underlying UTI,  end-stage renal disease with a history of noncompliance noted as well. MRI with large scattered left MCA distribution stroke. ECHO negative from a cardioembolic standpoint. Exam largely unchanged.      PLAN:  1. Stroke is large, risk for hemorraghic transformation is felt moderate to high. Now off coumadin, on ASA, ECHO negative for cardioembolic source, no a fib on tele. Follow up hypercoagulable labs. 2.  Continue Keppra  3. Continue addressing blood glucose, hyponatremia, UTI, c diff, ESRD nephrology following  4. Limit sedation, extubate when able     Please feel free to call with any questions. [phone] (cell phone).       Stevie Ambrose DO  Board Certified Neurology

## 2022-05-03 NOTE — PROGRESS NOTES
Pt with stridor wheezing noted after extubation. Pt had frequent cough, no sputum seen, pt was swallowing sputum. Nasal secretions clear noted. Sats 99%. . RR 30. With the frequent coughing. Respiratory had not left floor and came back to room. No RT tx orders. Dr Aldo Smith notified. Orders received. Pharmacy notified of need for Racemic epi. Will give Solumedrol as ordered when available in [place] [id number].  Electronically signed by Jerry Guevara RN on 5/3/2022 at 1:59 PM

## 2022-05-03 NOTE — PROGRESS NOTES
Hospitalist Progress Note  [facility]     Patient: Mahad Salcido  : 1970  MRN: [id number]  Code Status: Full Code    Hospital Day: 9   Date of Service: 5/3/2022    Subjective:   Patient seen and examined. Intubated. Sedation vacation. Repeat SBT today. Past Medical History:   Diagnosis Date    Acute ischemic stroke ([facility] 2022    Arthritis     Chronic kidney disease, stage 5, kidney failure (HCC)     Closed fracture of right shoulder girdle, with routine healing, subsequent encounter     DM (diabetes mellitus) type II controlled with renal manifestation ([facility] 1993    Gastroparesis     GERD (gastroesophageal reflux disease)     Hemodialysis patient ([facility]     dialysis on tues, thur, and sat at [facility]    Hypertension     Hypothyroid     Nasal congestion     recent    Neuropathy     Noncompliance with medications     Palliative care patient 10/08/2019    Restless leg syndrome        Past Surgical History:   Procedure Laterality Date    BREAST SURGERY Left     infected milk gland removed    BREAST SURGERY Right 2021    WEDGE EXCISION RIGHT BREAST DUCT WITH LACRIMAL DUCT PROBE, PEC BLOCK performed by Elizabeth Carballo MD at [address] [facility]          COLONOSCOPY Left 2020    Dr Duane Azul hepatic flexure-Severe tortuosity with severe spasming, 1 yr recall    DIALYSIS FISTULA CREATION Left 2019    REVISION LEFT UPPER EXTREMITY AV ACCESS WITH LEFT BRACHIAL ARTERY TO LEFT AXILLARY VEIN WITH  INTERPOSITIONAL ARTEGRAFT   performed by Dilip Sylvester MD at [address]  2012    [address] Right 2019    INSERTION OF RIGHT INTERNAL JUGULAR HEMODIALYSIS CATHETER performed by Dilip Sylvester MD at [address] [facility]  2018    1.  Moderately increased stainable iron identified by special stain in Kupffer cells and occasional hepatocytes.  Negative for evidence of significant portal or lobular inflammation. Negative for evidence of significant fibrosis    MA COLONOSCOPY W/BIOPSY SINGLE/MULTIPLE N/A 10/20/2017    Dr Jyotsna Hidalgo prep-Tubular AP (-) dysplasia x 2--3 yr recall    MA EGD TRANSORAL BIOPSY SINGLE/MULTIPLE N/A 2016    Dr Erma Mclaughlin EGD TRANSORAL BIOPSY SINGLE/MULTIPLE N/A 10/20/2017    Dr Arleen Montiel (-)   [address] VASCULAR SURGERY Left 2016    fistula by Dr Uyen Miller at [address]  10/02/2017    [facility]. Left upper fistulograms/venograms, balloon angioplasty cephalic vein arch [id number] conquest.    VASCULAR SURGERY  2018    FISTULOGRAM    VASCULAR SURGERY  10/29/2018    [facility]. Left upper fistulograms/venograms    VASCULAR SURGERY  2018    [facility]. Arch aortogram,left upper arteriograms.  VASCULAR SURGERY  2019    [facility]. Removal of tunneled dialyis catheter right internal jugular vein.  VASCULAR SURGERY  2019    [facility]. Left upper extremity fistulogram including venography to the superior vena cava. Balloon angioplasty left subclavian vein with 10mm x 40mm conquest balloon. Balloon angioplasty mid/proximal upper arm cephalic vein with 82XW x 40mm conquest balloon. Venograms after balloon angioplasty. Stent left mid/proximal upper arm cephalic vein stenosis fluency 10mm x 60mm self-expanding covered stent. Balloon     VASCULAR SURGERY  2019    cont angioplasty stent with 10mm x 40mm conquest balloon. Completion venograms left upper extremity.        Family History   Problem Relation Age of Onset    Colon Cancer Mother          at 63    Colon Polyps Mother     Lung Cancer Father          at 79    Colon Polyps Father     Stomach Cancer Sister     Breast Cancer Sister 27    Depression Daughter 25        committed suicide    Liver Cancer Neg Hx     Liver Disease Neg Hx     Esophageal Cancer Neg Hx     Rectal Cancer Neg Hx        Social History     Socioeconomic History    Marital status: Single     Spouse Year: Not on file       Current Facility-Administered Medications   Medication Dose Route Frequency Provider Last Rate Last Admin    hydrALAZINE (APRESOLINE) injection 10 mg  10 mg IntraVENous Q6H PRN Laurie Villeda MD   10 mg at 05/03/22 0931    aspirin chewable tablet 81 mg  81 mg Oral Daily Laurie Villeda MD        heparin (porcine) injection 5,000 Units  5,000 Units SubCUTAneous TID Laurie Villeda MD   5,000 Units at 05/03/22 9010    Peppermint Spirit SPRT   Does not apply PRN Clint Iglesias DO        insulin glargine (LANTUS) injection vial 5 Units  5 Units SubCUTAneous Nightly Laurie Villeda MD   5 Units at 05/02/22 2025    niCARdipine (CARDENE) 25 mg in dextrose 5 % 250 mL infusion  2.5-15 mg/hr IntraVENous Continuous Donita Lomeli MD   Stopped at 05/02/22 1622    dexmedetomidine (PRECEDEX) 400 mcg in sodium chloride 0.9 % 100 mL infusion  0.1-1.5 mcg/kg/hr IntraVENous Continuous Clint Iglesias DO 3.3 mL/hr at 05/03/22 1121 0.2 mcg/kg/hr at 05/03/22 1121    menthol-zinc oxide (CALMOSEPTINE) 0.44-20.6 % ointment   Topical BID Laurie Villeda MD   Given at 05/03/22 0514    sodium phosphate 10.5 mmol in sodium chloride 0.9 % 250 mL IVPB  0.16 mmol/kg IntraVENous PRN Patience Walter, DO        Or    sodium phosphate 21 mmol in sodium chloride 0.9 % 250 mL IVPB  0.32 mmol/kg IntraVENous PRN Patience Walter, DO        insulin lispro (HUMALOG) injection vial 0-12 Units  0-12 Units SubCUTAneous TID WC Clint Iglesias DO   8 Units at 05/03/22 0849    insulin lispro (HUMALOG) injection vial 0-6 Units  0-6 Units SubCUTAneous Nightly Clint Iglesias DO   2 Units at 05/02/22 2025    vancomycin (VANCOCIN) oral solution 125 mg  125 mg Oral 4 times per day Clint Iglesias DO   125 mg at 05/03/22 0514    hydrALAZINE (APRESOLINE) tablet 100 mg  100 mg Oral 3 times per day Patience Walter, DO   100 mg at 05/03/22 0514    glucagon (rDNA) injection 1 mg  1 mg IntraMUSCular PRN Aayush Ernandez Urmila Areas, DO        dextrose 5 % solution  100 mL/hr IntraVENous PRN Saia Rather, DO        glucose chewable tablet 16 g  4 tablet Oral PRN Asia Rather, DO        dextrose bolus (hypoglycemia) 10% 125 mL  125 mL IntraVENous PRN Asia Rather, DO   Stopped at 04/25/22 0699    Or    dextrose bolus (hypoglycemia) 10% 250 mL  250 mL IntraVENous PRN Asia Rather, DO        sodium chloride flush 0.9 % injection 5-40 mL  5-40 mL IntraVENous 2 times per day Asia Rather, DO   10 mL at 05/03/22 1000    sodium chloride flush 0.9 % injection 5-40 mL  5-40 mL IntraVENous PRN Asia Rather, DO        0.9 % sodium chloride infusion   IntraVENous PRN Asia Rather, DO        LORazepam (ATIVAN) injection 1 mg  1 mg IntraVENous Q5 Min PRN Asia Rather, DO        ondansetron (ZOFRAN-ODT) disintegrating tablet 4 mg  4 mg Oral Q8H PRN Asia Rather, DO        Or    ondansetron [facility]) injection 4 mg  4 mg IntraVENous Q6H PRN Asia Rather, DO        polyethylene glycol (GLYCOLAX) packet 17 g  17 g Oral Daily PRN Asia Rather, DO        acetaminophen (TYLENOL) tablet 650 mg  650 mg Oral Q6H PRN Asia Rather, DO   650 mg at 04/25/22 2232    Or    acetaminophen (TYLENOL) suppository 650 mg  650 mg Rectal Q6H PRN Asia Rather, DO        levETIRAcetam (KEPPRA) 500 mg/100 mL IVPB  500 mg IntraVENous Q12H Asia Rather, DO   Stopped at 05/03/22 0030    chlorhexidine (PERIDEX) 0.12 % solution 15 mL  15 mL Mouth/Throat BID Asia Rather, DO   15 mL at 05/03/22 9509    famotidine (PEPCID) 20 mg in sodium chloride (PF) 10 mL injection  20 mg IntraVENous Daily Asia Rather, DO   20 mg at 05/03/22 7651    magic butt cream   Topical Q4H PRN Asia Rather, DO   Given at 04/25/22 0028    magnesium sulfate 1000 mg in dextrose 5% 100 mL IVPB  1,000 mg IntraVENous PRN Patience Walter, DO        potassium bicarb-citric acid (EFFER-K) effervescent tablet 40 mEq  40 mEq Oral PRN Patience Walter, DO        Or    potassium chloride 10 mEq/100 mL IVPB (Peripheral Line)  10 mEq IntraVENous PRN Patience Walter, DO   Stopped at 04/25/22 0936         niCARdipine Stopped (05/02/22 1622)    dexmedetomidine HCl in NaCl 0.2 mcg/kg/hr (05/03/22 1121)    dextrose      sodium chloride          Objective:   BP (!) 206/55   Pulse 83   Temp 97.6 °F (36.4 °C) (Temporal)   Resp 17   Ht 5' 6\" (1.676 m)   Wt 131 lb 14.4 oz (59.8 kg)   SpO2 100%   BMI 21.29 kg/m²     General: no acute distress  HEENT: normocephalic, atraumatic  Neck: supple, symmetrical, trachea midline   Lungs: clear to auscultation bilaterally   Cardiovascular: s1 and s2 normal  Abdomen: soft, positive bowel sounds  Extremities: no cyanosis   Neuro: intubated, sedation vacation    Recent Labs     05/01/22 0226 05/02/22 0318 05/03/22 0215   WBC 6.1 5.7 5.8   RBC 3.32* 3.09* 3.07*   HGB 9.4* 8.9* 8.8*   HCT 30.3* 30.2* 28.4*   MCV 91.3 97.7 92.5   MCH 28.3 28.8 28.7   MCHC 31.0* 29.5* 31.0*    164 184     Recent Labs     05/01/22 0226 05/02/22 0318 05/03/22 0215   * 134* 136   K 3.8 4.2 3.8   ANIONGAP 14 17 16   CL 92* 94* 92*   CO2 26 23 28   BUN 17 32* 28*   CREATININE 2.4* 3.5* 2.8*   GLUCOSE 156* 116* 218*   CALCIUM 8.6 8.6 8.8     Recent Labs     05/01/22 0226 05/02/22 0318 05/03/22 0215   MG 2.2 2.6 2.3   PHOS 2.6 3.6 3.2     Recent Labs     05/01/22 0226 05/02/22 0318 05/03/22 0215   AST 13 20 18   ALT 6 7 8   BILITOT 0.3 <0.2 0.3   ALKPHOS 95 101 107*     No results for input(s): PH, PO2, PCO2, HCO3, BE, O2SAT in the last 72 hours. No results for input(s): TROPONINI in the last 72 hours. Recent Labs     05/01/22  1231 05/02/22  0318 05/03/22  0215   INR 1.06 1.13 1.04     No results for input(s): LACTA in the last 72 hours.       Intake/Output Summary (Last 24 hours) at 5/3/2022 1141  Last data filed at 5/3/2022 1042  Gross per 24 hour Intake 1729.34 ml   Output 361 ml   Net 1368.34 ml     Assessment and Plan:   Acute left MCA infarct  Per MRI brain 4/29/2022  Neurology following  Neurochecks  Meds per neurology  Coumadin per neurology since [facility] given risk of hemorrhagic conversion     Seizure  Neurology following  AEDs per neurology  Seizure precautions     Ventilator dependent acute respiratory failure  Secondary to above processes  Titrate minute ventilation for pH 7.35  Follow ABG/CXR  Follow plateau pressure  Failed SBT again 5/1/2022  Repeat SBT today     ESRD on HD  Renal following     Medical noncompliance       C. difficile infection  Oral vancomycin     Tobacco dependence       [facility]  Since resolved  History of IDDM2  Meds on board  Follow Accu-Cheks/electrolytes  HbA1c 9.4  Poor control     DVT prophylaxis  Heparin    Total critical care time: 53 minutes    Puneet Noyola MD   5/3/2022 11:41 AM

## 2022-05-03 NOTE — PROGRESS NOTES
Palliative Care Progress Note  5/3/2022 8:32 AM    Patient:  Maykel Benoit  YOB: 1970  Primary Care Physician: ROMINA Tomlinson  Advance Directive: Full Code  Admit Date: 4/24/2022       Hospital Day: 9  Portions of this note have been copied forward, however, changed to reflect the most current clinical status of this patient. CHIEF COMPLAINT/REASON FOR CONSULTATION Goals of care    SUBJECTIVE:  Ms. Carlos Eduardo Mata remains intubated and minimally sedated in CCU. She is does open eyes to voice but not following commands. Plans are to attempt SBT again today    Review of Systems:   14 point review of systems is negative except as specifically addressed above. Objective:   VITALS:  BP (!) 183/97   Pulse 63   Temp 97.6 °F (36.4 °C) (Temporal)   Resp (!) 7   Ht 5' 6\" (1.676 m)   Wt 131 lb 14.4 oz (59.8 kg)   SpO2 100%   BMI 21.29 kg/m²   24HR INTAKE/OUTPUT:      Intake/Output Summary (Last 24 hours) at 5/3/2022 9589  Last data filed at 5/3/2022 0500  Gross per 24 hour   Intake 1729.34 ml   Output 20 ml   Net 1709.34 ml     General appearance: 46 yo female, chronically ill appearing, intubated/sedated, no acute distress  Head: Normocephalic, without obvious abnormality, atraumatic  Eyes: conjunctivae/corneas clear. PERRL  Ears: normal external ears and nose  Neck: supple, symmetrical, trachea midline   Lungs: scattered rhonchi bilaterally to asucultation, mechanically ventilated, equal chest rise  Heart: regular rate and rhythm, S1, S2 normal, no murmur  Abdomen: soft, no grimacing w/ palpation; non-distended, bowel sounds present .  FMS in place with diarrhea noted   Extremities: No lower extremity edema,  No erythema, no grimacing w/ palpation  Skin: warm, dry  Neurologic: Intubated/sedated, opens eyes to voice, localizes to pain, does not follow commands    Medications:      niCARdipine Stopped (05/02/22 1622)    dexmedetomidine HCl in NaCl 0.8 mcg/kg/hr (05/03/22 8521)    dextrose      sodium chloride        aspirin  81 mg Oral Daily    heparin (porcine)  5,000 Units SubCUTAneous TID    insulin glargine  5 Units SubCUTAneous Nightly    menthol-zinc oxide   Topical BID    insulin lispro  0-12 Units SubCUTAneous TID WC    insulin lispro  0-6 Units SubCUTAneous Nightly    vancomycin  125 mg Oral 4 times per day    hydrALAZINE  100 mg Oral 3 times per day    sodium chloride flush  5-40 mL IntraVENous 2 times per day    levetiracetam  500 mg IntraVENous Q12H    chlorhexidine  15 mL Mouth/Throat BID    famotidine (PEPCID) injection  20 mg IntraVENous Daily     Peppermint Spirit, sodium phosphate IVPB **OR** sodium phosphate IVPB, glucagon (rDNA), dextrose, glucose, dextrose bolus (hypoglycemia) **OR** dextrose bolus (hypoglycemia), sodium chloride flush, sodium chloride, LORazepam, ondansetron **OR** ondansetron, polyethylene glycol, acetaminophen **OR** acetaminophen, magic butt cream, magnesium sulfate, potassium alternative oral replacement **OR** potassium chloride  Diet NPO  ADULT TUBE FEEDING; Orogastric; Peptide Based; Continuous; 30; Yes; 5; Q 6 hours; 54; 0; Other (specify); no free water flush at this time     Lab and other Data:     Recent Labs     05/01/22 0226 05/02/22 0318 05/03/22  0215   WBC 6.1 5.7 5.8   HGB 9.4* 8.9* 8.8*    164 184     Recent Labs     05/01/22 0226 05/02/22 0318 05/03/22  0215   * 134* 136   K 3.8 4.2 3.8   CL 92* 94* 92*   CO2 26 23 28   BUN 17 32* 28*   CREATININE 2.4* 3.5* 2.8*   GLUCOSE 156* 116* 218*     Recent Labs     05/01/22 0226 05/02/22 0318 05/03/22  0215   AST 13 20 18   ALT 6 7 8   BILITOT 0.3 <0.2 0.3   ALKPHOS 95 101 107*       Assessment/Plan   Principal Problem:    Acute ischemic stroke (HCC)  Active Problems:    Weakness    Gastroesophageal reflux disease without esophagitis    Acquired hypothyroidism    Anemia in chronic kidney disease (CKD)    Type 2 diabetes mellitus with chronic kidney disease on chronic dialysis, with long-term current use of insulin ([address]    Acute encephalopathy    Respiratory failure ([address]    Uncontrolled type 2 diabetes mellitus with complication, with long-term current use of insulin ([address]    ESRD (end stage renal disease) on dialysis ([address]    PVD (peripheral vascular disease) ([facility]    ESRD on hemodialysis ([address]    Hyponatremia    Hyperglycemia due to type 2 diabetes mellitus ([address]    Uncontrolled hypertension    Seizure ([facility]    Palliative care patient    Uncontrolled type 2 diabetes mellitus with hyperosmolar nonketotic hyperglycemia (HCC)    Altered mental state    Acute hypoxemic respiratory failure (HCC)    Generalized weakness    UTI (urinary tract infection)    Closed fracture of left distal femur (HCC)    History of infection with vancomycin resistant Enterococcus (VRE)  Resolved Problems:    * No resolved hospital problems. *      Visit Summary:  Chart reviewed, patient discussed with nursing staff. Reviewed health issues, work up and treatment plan as well as factors that lead to hospitalization. Obtained report from RN with no acute events overnight. She remains intubated and weaning sedation to attempt another SBT. She opens eyes to voice and withdrawals to pain but not following commands. Tolerated dialysis yesterday. I rounded this afternoon and pt had passed SBT and extubated to NC. Shortly after received notification that pt was having stridor and issues with secretions and requiring reintubation. I called her significant other, Kervin Ferreira, to update on pts status and attempt at extubation. We discussed further pt potentially requiring a trach/PEG and additionally these being required for transfer to [facility]. I explained that pt may be dependent on these long term. He expresses how difficult the decision will be to make. I offered a bedside visit and he explained he would likely be able to come up Friday.  I provided education that decision may need to be made prior to Friday based on pt needing reintubation. He is planning to call pts sister, quan White to discuss pts status and get her input. At this point he would like pt to remain a full code. Opportunity for questions and emotional support provided. Will follow. Recommendations:   1. Palliative care: GOC continue aggressive course and monitor for improvement. Continued conversations with sig other regarding trach and peg- will f/u. Code status: Full code    2. Acute hypoxemic respiratory failure- Extubated today following passed SBT but subsequently reintubated this afternoon     3. Acute left MCA infarct- mgmt per neuro. MRI 4/29/22 noted. Echo negative 4/30/22. 4. Hyperglycemia w/ Hx of DM II-mgmt per Hospitalist, SSI    5. S/p tonic-clonic seizure-mgmt per Neurology, cEEG discontinued, Keppra 500 mg BID    6. Concern for UTI-Urine cx no growth     7. ESRD on HD-Nephrology managing. Dialysis today    8. Hypertension-Hydralazine 100 mg TID, cardene gtt as needed    9. Hyponatremia-improving    10. Clostridium difficile/Norovirus-Oral vancomycin     Thank you for consulting Palliative Care and allowing us to participate in the care of this patient.    Time Spent Counseling > 50%:  YES                                   Total Time Spent with patient/family counseling, workup/treatment review, counseling and placement of orders/preparation of this note: 37 minutes    Electronically signed by ROMINA Cabrera CNP on 5/3/2022 at 8:31 AM    (Please note that portions of this note were completed with a voice recognition program.  Rick Perez made to edit the dictations but occasionally words are mis-transcribed.)

## 2022-05-03 NOTE — PROGRESS NOTES
Dr Elinor Cardona notified that pt is not tolerating the extubation. Respiratory Brandt Marie RT here at present. Adjusting BiPAP until Dr Elinor Cardona is able to get to floor. Sats. 95%. On 4L per BiPAP. -130 ST. /78.  Electronically signed by Lv Figueroa RN on 5/3/2022 at 4:47 PM

## 2022-05-03 NOTE — PROGRESS NOTES
Solumedrol 125mg IV now given. Racemic epi RT tx now started also. Pt still with stridor. Sats 99%. RR 28-30. HR . Will monitor. Will start BiPAP after tx.  Electronically signed by Lv Figueroa RN on 5/3/2022 at 2:09 PM

## 2022-05-03 NOTE — PROGRESS NOTES
Nephrology ([address] [facility]) Progress Note    Patient:  Joaquin Arellano  YOB: 1970  Date of Service: 5/3/2022  MRN: [id number]   Acct: [de-identified]   Primary Care Physician: ROMINA Rosario  Advance Directive: Full Code  Admit Date: 4/24/2022       Hospital Day: 9  Referring Provider: Shanelle Figueroa MD    Patient independently seen and examined, Chart, Consults, Notes, Operative notes, Labs, Cardiology, and Radiology studies reviewed as available. Subjective:  Joaquin Arellano is a 47 y.o. female for whom we were consulted for evaluation and treatment of end-stage renal disease. Patient also has a history of seizure disorder, type 2 diabetes, hypertension, frequent hospitalization with noncompliance and poorly controlled diabetes. Patient was brought to the emergency room after she was found to have unresponsiveness and witnessed grand mal seizure. She was brought in by ambulance. On arrival in the emergency room, she was found to be severely hyperglycemic and had a urinary tract infection. Patient was intubated, sedated and treated by neurology for grand mal seizure. She was also receiving IV antibiotics and insulin drip. Her blood sugar control had significantly improved. On April 25, she has received emergent hemodialysis treatment for correction of volume status as well as hyponatremia. She was moved from ICU to CCU. Today, no overnight events. Patient remains on the ventilator and unable to participate in the history and physical examination. Events reviewed with nursing at the bedside. No family present. She was tolerating dialysis without issue yesterday.     Allergies:  Penicillins, Lamisil advanced [terbinafine], Reglan [metoclopramide], and Stadol [butorphanol]    Medicines:  Current Facility-Administered Medications   Medication Dose Route Frequency Provider Last Rate Last Admin    hydrALAZINE (APRESOLINE) injection 10 mg  10 mg IntraVENous Q6H PRN Andrew Mullins Mary Narayan MD   10 mg at 05/03/22 6190    aspirin chewable tablet 81 mg  81 mg Oral Daily Yoselin Hart MD        heparin (porcine) injection 5,000 Units  5,000 Units SubCUTAneous TID Yoselin Hart MD   5,000 Units at 05/03/22 9518    Peppermint Spirit SPRT   Does not apply PRN Lyn Sanchez, DO        insulin glargine (LANTUS) injection vial 5 Units  5 Units SubCUTAneous Nightly Yoselin Hart MD   5 Units at 05/02/22 2025    niCARdipine (CARDENE) 25 mg in dextrose 5 % 250 mL infusion  2.5-15 mg/hr IntraVENous Continuous Verito Knapp MD   Stopped at 05/02/22 1622    dexmedetomidine (PRECEDEX) 400 mcg in sodium chloride 0.9 % 100 mL infusion  0.1-1.5 mcg/kg/hr IntraVENous Continuous Lyn Sanchez DO 3.3 mL/hr at 05/03/22 1121 0.2 mcg/kg/hr at 05/03/22 1121    menthol-zinc oxide (CALMOSEPTINE) 0.44-20.6 % ointment   Topical BID Yoselin Hart MD   Given at 05/03/22 0514    sodium phosphate 10.5 mmol in sodium chloride 0.9 % 250 mL IVPB  0.16 mmol/kg IntraVENous PRN Patience Walter, DO        Or    sodium phosphate 21 mmol in sodium chloride 0.9 % 250 mL IVPB  0.32 mmol/kg IntraVENous PRN Patience Walter, DO        insulin lispro (HUMALOG) injection vial 0-12 Units  0-12 Units SubCUTAneous TID WC Lyn Sanchez, DO   8 Units at 05/03/22 0849    insulin lispro (HUMALOG) injection vial 0-6 Units  0-6 Units SubCUTAneous Nightly Lyn Sanchez, DO   2 Units at 05/02/22 2025    vancomycin (VANCOCIN) oral solution 125 mg  125 mg Oral 4 times per day Lyn Sanhcez, DO   125 mg at 05/03/22 0919    hydrALAZINE (APRESOLINE) tablet 100 mg  100 mg Oral 3 times per day Patience Walter, DO   100 mg at 05/03/22 0514    glucagon (rDNA) injection 1 mg  1 mg IntraMUSCular PRN Lyn Sanchez, DO        dextrose 5 % solution  100 mL/hr IntraVENous PRN Lyn Sanchez, DO        glucose chewable tablet 16 g  4 tablet Oral PRN Lyn Sanchez, DO        dextrose bolus (hypoglycemia) 10% 125 mL  125 mL IntraVENous PRN Jillian Gazella, DO   Stopped at 04/25/22 1352    Or    dextrose bolus (hypoglycemia) 10% 250 mL  250 mL IntraVENous PRN Jillian Gazella, DO        sodium chloride flush 0.9 % injection 5-40 mL  5-40 mL IntraVENous 2 times per day Jillian Gazella, DO   10 mL at 05/03/22 1000    sodium chloride flush 0.9 % injection 5-40 mL  5-40 mL IntraVENous PRN Jillian Gazella, DO        0.9 % sodium chloride infusion   IntraVENous PRN Jillian Gazella, DO        LORazepam (ATIVAN) injection 1 mg  1 mg IntraVENous Q5 Min PRN Jillian Gazella, DO        ondansetron (ZOFRAN-ODT) disintegrating tablet 4 mg  4 mg Oral Q8H PRN Jillian Gazella, DO        Or    ondansetron [facility]) injection 4 mg  4 mg IntraVENous Q6H PRN Jillian Gazella, DO        polyethylene glycol [facility]) packet 17 g  17 g Oral Daily PRN Jillian Gazella, DO        acetaminophen (TYLENOL) tablet 650 mg  650 mg Oral Q6H PRN Jillian Gazella, DO   650 mg at 04/25/22 2976    Or    acetaminophen (TYLENOL) suppository 650 mg  650 mg Rectal Q6H PRN Jillian Gazella, DO        levETIRAcetam (KEPPRA) 500 mg/100 mL IVPB  500 mg IntraVENous Q12H Jillian Gazella, DO   Stopped at 05/03/22 0030    chlorhexidine (PERIDEX) 0.12 % solution 15 mL  15 mL Mouth/Throat BID Jillian Gazella, DO   15 mL at 05/03/22 0235    famotidine (PEPCID) 20 mg in sodium chloride (PF) 10 mL injection  20 mg IntraVENous Daily Jillian Gazella, DO   20 mg at 05/03/22 3738    magic butt cream   Topical Q4H PRN Jillian Gazella, DO   Given at 04/25/22 0028    magnesium sulfate 1000 mg in dextrose 5% 100 mL IVPB  1,000 mg IntraVENous PRN Patience Walter, DO        potassium bicarb-citric acid (EFFER-K) effervescent tablet 40 mEq  40 mEq Oral PRN Patience Walter, DO        Or    potassium chloride 10 mEq/100 mL IVPB (Peripheral Line)  10 mEq IntraVENous PRN Patience Walter, DO   Stopped at 04/25/22 0936       Past Medical History:  Past Medical History:   Diagnosis Date    Acute ischemic stroke ([facility] 4/30/2022    Arthritis     Chronic kidney disease, stage 5, kidney failure (HCC)     Closed fracture of right shoulder girdle, with routine healing, subsequent encounter     DM (diabetes mellitus) type II controlled with renal manifestation ([facility] 06/1993    Gastroparesis     GERD (gastroesophageal reflux disease)     Hemodialysis patient ([facility]     dialysis on tues, thur, and sat at [facility]    Hypertension     Hypothyroid     Nasal congestion     recent    Neuropathy     Noncompliance with medications     Palliative care patient 10/08/2019    Restless leg syndrome        Past Surgical History:  Past Surgical History:   Procedure Laterality Date    BREAST SURGERY Left 2008    infected milk gland removed    BREAST SURGERY Right 5/25/2021    WEDGE EXCISION RIGHT BREAST DUCT WITH LACRIMAL DUCT PROBE, PEC BLOCK performed by Lobo Pascual MD at [address] [facility]      2008    COLONOSCOPY Left 9/17/2020    Dr Olympia Essex hepatic flexure-Severe tortuosity with severe spasming, 1 yr recall    DIALYSIS FISTULA CREATION Left 1/25/2019    REVISION LEFT UPPER EXTREMITY AV ACCESS WITH LEFT BRACHIAL ARTERY TO LEFT AXILLARY VEIN WITH  INTERPOSITIONAL ARTEGRAFT   performed by Joshua Dos Santos MD at [address]  2012    [address] Right 1/25/2019    INSERTION OF RIGHT INTERNAL JUGULAR HEMODIALYSIS CATHETER performed by Joshua Dos Santos MD at [address] [facility]  08/17/2018    1.  Moderately increased stainable iron identified by special stain in Kupffer cells and occasional hepatocytes. Negative for evidence of significant portal or lobular inflammation.  Negative for evidence of significant fibrosis    OR COLONOSCOPY W/BIOPSY SINGLE/MULTIPLE N/A 10/20/2017    Dr Jax Donato prep-Tubular AP (-) dysplasia x 2--3 yr recall    OR EGD TRANSORAL BIOPSY SINGLE/MULTIPLE N/A 2016    Dr José Luis Rodriguez EGD TRANSORAL BIOPSY SINGLE/MULTIPLE N/A 10/20/2017    Dr Lynda Shah (-)    TUBAL LIGATION          VASCULAR SURGERY Left 2016    fistula by Dr Santiago Senior at [address]  10/02/2017    [facility]. Left upper fistulograms/venograms, balloon angioplasty cephalic vein arch [id number] conquest.    VASCULAR SURGERY  2018    FISTULOGRAM    VASCULAR SURGERY  10/29/2018    [facility]. Left upper fistulograms/venograms    VASCULAR SURGERY  2018    [facility]. Arch aortogram,left upper arteriograms.  VASCULAR SURGERY  2019    [facility]. Removal of tunneled dialyis catheter right internal jugular vein.  VASCULAR SURGERY  2019    [facility]. Left upper extremity fistulogram including venography to the superior vena cava. Balloon angioplasty left subclavian vein with 10mm x 40mm conquest balloon. Balloon angioplasty mid/proximal upper arm cephalic vein with 35CJ x 40mm conquest balloon. Venograms after balloon angioplasty. Stent left mid/proximal upper arm cephalic vein stenosis fluency 10mm x 60mm self-expanding covered stent. Balloon     VASCULAR SURGERY  2019    cont angioplasty stent with 10mm x 40mm conquest balloon. Completion venograms left upper extremity.        Family History  Family History   Problem Relation Age of Onset    Colon Cancer Mother          at 63    Colon Polyps Mother     Lung Cancer Father          at 79    Colon Polyps Father     Stomach Cancer Sister     Breast Cancer Sister 27    Depression Daughter 25        committed suicide    Liver Cancer Neg Hx     Liver Disease Neg Hx     Esophageal Cancer Neg Hx     Rectal Cancer Neg Hx        Social History  Social History     Socioeconomic History    Marital status: Single     Spouse name: Not on file    Number of children: 2    Years of education: Not on file    Highest education level: Not on file   Occupational History    Not on file   Tobacco Use    Smoking status: Current Every Day Smoker     Packs/day: 0.50     Years: 33.00     Pack years: 16.50     Types: Cigarettes    Smokeless tobacco: Never Used    Tobacco comment: NOW at 1/4 PD, started at age 24 and has averaged 2 PPD   Vaping Use    Vaping Use: Never used   Substance and Sexual Activity    Alcohol use: No    Drug use: Not Currently     Types: Marijuana [place] [employer])    Sexual activity: Not Currently     Partners: Male   Other Topics Concern    Not on file   Social History Narrative    Not on file     Social Determinants of Health     Financial Resource Strain:     Difficulty of Paying Living Expenses: Not on file   Food Insecurity:     Worried About Running Out of Food in the Last Year: Not on file    Kendy of Food in the Last Year: Not on file   Transportation Needs:     Lack of Transportation (Medical): Not on file    Lack of Transportation (Non-Medical):  Not on file   Physical Activity:     Days of Exercise per Week: Not on file    Minutes of Exercise per Session: Not on file   Stress:     Feeling of Stress : Not on file   Social Connections:     Frequency of Communication with Friends and Family: Not on file    Frequency of Social Gatherings with Friends and Family: Not on file    Attends Oriental orthodox Services: Not on file    Active Member of [address] [place] [address] [employer] or Organizations: Not on file    Attends Club or Organization Meetings: Not on file    Marital Status: Not on file   Intimate Partner Violence:     Fear of Current or Ex-Partner: Not on file    Emotionally Abused: Not on file    Physically Abused: Not on file    Sexually Abused: Not on file   Housing Stability:     Unable to Pay for Housing in the Last Year: Not on file    Number of [place] in the Last Year: Not on file    Unstable Housing in the Last Year: Not on file         Review of Systems:  History obtained from unobtainable from patient due to mental status          Objective:  Patient Vitals for the past 24 hrs:   BP Temp Temp src Pulse Resp SpO2 Weight   05/03/22 1019    83 17 100 %    05/03/22 0931 (!) 206/55         05/03/22 0650    63 (!) 7 100 %    05/03/22 0600 (!) 183/97   62 16 100 %    05/03/22 0521       131 lb 14.4 oz (59.8 kg)   05/03/22 0500 (!) 190/73   61 16 100 %    05/03/22 0400 (!) 193/55   61 16 100 %    05/03/22 0302 (!) 176/60 97.6 °F (36.4 °C) Temporal 65 16 100 %    05/03/22 0217 (!) 190/76   66 16 100 %    05/03/22 0212    72 16 100 %    05/03/22 0100 (!) 161/67   64 16 100 %    05/03/22 0000 (!) 160/70   68 16 100 %    05/02/22 2300 (!) 160/64 97.5 °F (36.4 °C) Temporal 69 16 99 %    05/02/22 2204    84 16 100 %    05/02/22 2200 (!) 159/67   74 16 99 %    05/02/22 2100 124/85   71 16 100 %    05/02/22 2000 (!) 182/101   74 16 100 %    05/02/22 1900 (!) 178/66 98.5 °F (36.9 °C) Temporal 79 17 99 %    05/02/22 1821    69 16 98 %    05/02/22 1800 (!) 174/59   68 16 96 %    05/02/22 1700 (!) 186/62   77 18 100 %    05/02/22 1600 (!) 148/61 97.8 °F (36.6 °C) Temporal 79 16 99 %    05/02/22 1500 (!) 177/65   77 16 100 %    05/02/22 1440    78 16 100 %    05/02/22 1410 (!) 225/71   80 16 100 %    05/02/22 1300 (!) 217/73   69 16 100 %    05/02/22 1200 (!) 164/70 97.2 °F (36.2 °C) Temporal 66 15 100 %        Intake/Output Summary (Last 24 hours) at 5/3/2022 1129  Last data filed at 5/3/2022 1042  Gross per 24 hour   Intake 1729.34 ml   Output 361 ml   Net 1368.34 ml     General: Ventilated and unable to participate in the history and physical examination  Chest:  clear to auscultation bilaterally  CVS: regular rate and rhythm  Abdominal: soft, nontender, normal bowel sounds  Extremities: no cyanosis or edema  Skin: warm and dry without rash    Labs:  BMP:   Recent Labs     05/01/22  0226 05/02/22  0318 05/03/22  0215   * 134* 136   K 3.8 4.2 3.8   CL 92* 94* 92*   CO2 26 23 28   PHOS 2.6 3.6 3.2   BUN 17 32* 28*   CREATININE 2. 4* 3.5* 2.8*   CALCIUM 8.6 8.6 8.8     CBC:   Recent Labs     05/01/22 0226 05/02/22 0318 05/03/22 0215   WBC 6.1 5.7 5.8   HGB 9.4* 8.9* 8.8*   HCT 30.3* 30.2* 28.4*   MCV 91.3 97.7 92.5    164 184     LIVER PROFILE:   Recent Labs     05/01/22 0226 05/02/22 0318 05/03/22 0215   AST 13 20 18   ALT 6 7 8   BILITOT 0.3 <0.2 0.3   ALKPHOS 95 101 107*     PT/INR:   Recent Labs     05/01/22  1231 05/02/22 0318 05/03/22 0215   PROTIME 13.7 14.4 13.6   INR 1.06 1.13 1.04     APTT: No results for input(s): APTT in the last 72 hours. BNP:  No results for input(s): BNP in the last 72 hours. Ionized Calcium:No results for input(s): IONCA in the last 72 hours. Magnesium:  Recent Labs     05/01/22 0226 05/02/22 0318 05/03/22 0215   MG 2.2 2.6 2.3     Phosphorus:  Recent Labs     05/01/22 0226 05/02/22 0318 05/03/22 0215   PHOS 2.6 3.6 3.2     HgbA1C:   Recent Labs     05/01/22  1032   LABA1C 9.4*     Hepatic:   Recent Labs     05/01/22 0226 05/02/22 0318 05/03/22 0215   ALKPHOS 95 101 107*   ALT 6 7 8   AST 13 20 18   PROT 6.1* 5.6* 6.0*   BILITOT 0.3 <0.2 0.3   LABALBU 2.9* 2.8* 3.1*     Lactic Acid: No results for input(s): LACTA in the last 72 hours. Troponin: No results for input(s): CKTOTAL, CKMB, TROPONINT in the last 72 hours. ABGs:   Lab Results   Component Value Date    PHART 7.330 04/24/2022    PO2ART 389.0 04/24/2022    IOE0RKW 41.0 04/24/2022     CRP:  No results for input(s): CRP in the last 72 hours. Sed Rate:  No results for input(s): SEDRATE in the last 72 hours. Culture Results:   Blood Culture Recent:   Recent Labs     04/24/22  1447   BC No growth after 5 days of incubation.        Urine Culture Recent :   Recent Labs     04/24/22  1354   LABURIN No growth at 36-48 hours       Radiology reports as per the Radiologist  Radiology: CT Head WO Contrast    Result Date: 4/24/2022  CT HEAD WO CONTRAST 4/24/2022 2:01 PM HISTORY: Altered mental status COMPARISON: CT scan dated 11/18/2021 DOSE LENGTH PRODUCT: 766 mGy cm TECHNIQUE: Helical tomographic images of the brain were obtained without the use of intravenous contrast. Automated exposure control was also utilized to decrease patient radiation dose. FINDINGS: There is no evidence of evolving large vascular territory infarct. No visualized intra-axial or extra-axial hemorrhage. No mass lesion is identified. Normal size and configuration of the ventricular system. The basal cisterns are symmetric. Posterior fossa structures are unremarkable. The included orbits and their contents are unremarkable. Chronic paranasal sinus disease on the left. The visualized osseous structures and overlying soft tissues of the skull and face are unremarkable. 1. No acute intracranial process. Signed by Dr Lori Cm    Result Date: 4/24/2022  XR CHEST PORTABLE 4/24/2022 4:27 PM HISTORY: ET tube, enteric tube  Technique: Single AP view of the chest COMPARISONS: 4/24/2022 FINDINGS: Endotracheal tube is identified with tip essentially touching the krysta. Enteric tube courses into the stomach with tip curled in the fundus back towards the gastroesophageal junction. The lungs are clear and well expanded. Heart size is stable. Pulmonary vasculature are nondilated. No pleural effusion or pneumothorax. 1. Endotracheal tube is deep, tip essentially touching the krysta. Lungs are clear and well expanded. I would recommend 2-3 cm withdrawal for a more optimal positioning. The results of this exam were discussed with Pat Rivers (ICU nursing) on 4/24/2022 at 1640 hours Signed by Dr Aurora Benson    XR CHEST PORTABLE    Result Date: 4/24/2022  XR CHEST PORTABLE 4/24/2022 1:17 PM HISTORY: ET tube placement  Technique: Single AP view of the chest COMPARISONS: 4/24/2022 FINDINGS: Status post endotracheal intubation. ET tube is in good position. Lungs are well-expanded.  Enteric tube curls on itself in the midesophagus and then extends back up into the throat. Heart size is stable. Pulmonary vasculature are nondilated. 1. ET tube is in good position. Lungs are clear and well expanded. 2. Enteric tube curls on itself in the mid esophagus before extending back up into the throat. This will need repositioned. The results of this exam were discussed with Dr. Gabriela Leiva on 4/24/2022 at 1329 hours Signed by Dr Obed Bear    XR CHEST PORTABLE    Result Date: 4/24/2022  XR CHEST PORTABLE 4/24/2022 12:34 PM HISTORY: Altered mental status  Technique: Single AP view of the chest COMPARISONS: Chest exam dated 4/1/2022 FINDINGS: No lung consolidation. No pleural effusion or pneumothorax. Cardiomediastinal silhouette and pulmonary vasculature are unremarkable. No acute bony abnormality. Left subclavian region vascular stent with additional stent projecting over the left humerus. No acute cardiopulmonary process. Signed by Dr Obed Bear    VL DUP CAROTID BILATERAL    Result Date: 4/29/2022  Vascular Carotid Procedure  Demographics   Patient Name    Madeline Greene Age                   47   Patient Number  [id number]       Gender                Female   Visit Number    [id number]    Interpreting          Surendra Overton                               Physician   Date of Birth   1970   Referring Physician   Angelo Velez   Accession       [id number]   [address], [place], [address]  Number  Procedure Type of Study:   Cerebral:Carotid, VL CAROTID BILATERAL. Indications for Study:Stroke. Risk Factors   - The patient's risk factor(s) include: diabetes mellitus and arterial     hypertension.   - Current - Every day. Allergies   - Penicillins.   - Stadol.   - Other allergy:(Lamisil Advanced). Technical Quality:Limited visualization due to patient on ventilator. Impression   There is mixed plaque visualized in the bilateral internal carotid  artery(ies). There is less than 50% stenosis in the right internal carotid artery.   There is less than 50% stenosis of the left internal carotid artery. There is normal antegrade flow in the bilateral vertebral artery(ies). right side is very limited due to positioning, her chin is resting on her  right clavicle and she is vented. Signature   ----------------------------------------------------------------  Electronically signed by Charmayne Ash Victoria(Interpreting  physician) on 04/29/2022 03:20 PM  ----------------------------------------------------------------  Blood Pressure:Right arm 178/69 mmHg. Velocities are measured in cm/s ; Diameters are measured in mm Carotid Right Measurements +------------+-------+-------+--------+-------+------------+---------------+ ! Location    ! PSV    ! EDV    ! Angle   ! RI     !%Stenosis   ! Tortuosity     ! +------------+-------+-------+--------+-------+------------+---------------+ ! Prox CCA    !47.2   !       !60      !       !            !               ! +------------+-------+-------+--------+-------+------------+---------------+ ! Mid CCA     !47.2   !       !60      !       !            !               ! +------------+-------+-------+--------+-------+------------+---------------+ ! Prox ICA    !41     !7.46   !60      !0.82   !            !               ! +------------+-------+-------+--------+-------+------------+---------------+ ! Mid ICA     !62.7   !11.2   !60      !0.82   !            !               ! +------------+-------+-------+--------+-------+------------+---------------+ ! Dist ICA    !75.2   !13.7   !60      !0.82   !            !               ! +------------+-------+-------+--------+-------+------------+---------------+ ! Prox ECA    !147    !       !61      !       !            !               ! +------------+-------+-------+--------+-------+------------+---------------+ ! Vertebral   !47.2   !13.7   !60      !0.71   !            !               ! +------------+-------+-------+--------+-------+------------+---------------+   - There is antegrade vertebral flow noted on the right side. - Additional Measurements:ICAPSV/CCAPSV 1.59. Carotid Left Measurements +------------+-------+-------+--------+-------+------------+---------------+ ! Location    ! PSV    ! EDV    ! Angle   ! RI     !%Stenosis   ! Tortuosity     ! +------------+-------+-------+--------+-------+------------+---------------+ ! Prox CCA    !81.4   !9.94   !60      !0.88   !            !               ! +------------+-------+-------+--------+-------+------------+---------------+ ! Mid CCA     !70.2   !7.46   !60      !0.89   !            !               ! +------------+-------+-------+--------+-------+------------+---------------+ ! Prox ICA    !69.4   !11.9   !60      !0.83   !            !               ! +------------+-------+-------+--------+-------+------------+---------------+ ! Mid ICA     !80.6   !19.6   !60      !0.76   !            !               ! +------------+-------+-------+--------+-------+------------+---------------+ ! Dist ICA    !70.3   !12.5   !50      !0.82   !            !               ! +------------+-------+-------+--------+-------+------------+---------------+ ! Prox ECA    !113    !       !61      !       !            !               ! +------------+-------+-------+--------+-------+------------+---------------+ ! Vertebral   !38.8   !       !60      !       !            !               ! +------------+-------+-------+--------+-------+------------+---------------+   - There is antegrade vertebral flow noted on the left side. - Additional Measurements:ICAPSV/CCAPSV 0.99. ICAEDV/CCAEDV 1.97. MRI BRAIN WO CONTRAST    Result Date: 4/29/2022  Examination. MRI BRAIN WO CONTRAST 4/29/2022 11:43 AM History: Seizure activity. The multiplanar, multisequence MR imaging of the brain is performed without contrast enhancement. There is no previous similar study for comparison. The correlation made with CT scan of the head dated 4/24/2022.  The diffusion-weighted imaging sequence including the ADC map show an area of acute infarction involving the left posterior frontal, parietal and basal occipital lobe, the left middle cerebral artery territory. A small linear focus of T2 signal in the right posterior paraventricular white matter has no corresponding ADC map changes and may represent a subacute or chronic process. The targeted images of the mesial temporal lobe show moderate symmetrical mesial temporal atrophy without presence of any abnormal signal. This may be a process of general volume loss/atrophy. Moderately dilated ventricles, basal cisterns and the cortical sulci suggestive chronic volume loss/atrophy. The orbits are suboptimally evaluated. No obvious abnormality. Limited visualized optic nerve, optic chiasma and optic tracts are normal and symmetrical. The pituitary gland appears normal. The corpus callosum, the brainstem and cerebellum are normal. The FLAIR sequence images show significant periventricular T2 signal changes representing chronic white matter ischemia and transependymal CSF flow. The gradient recalled imaging sequence show normal flow void in the visualized intracranial vessels, particularly normal flow in the limited visualized left middle cerebral artery. There is mucosal thickening involving the ethmoid sinuses. The bilateral mastoid effusion. 1. Acute left middle cerebral territory infarct. 2. Chronic ischemic and atrophic changes. 3. The symmetrical mesial temporal atrophy is probably a part of the generalized volume loss. 4. Ethmoid sinusitis. Bilateral mastoid effusion. The above report was immediately called to the nurse in charge of the patient in CCU.  Signed by Dr Daniels Shock    ECHO 2D Afua Watson    Result Date: 4/30/2022  Transthoracic Echocardiography Report (TTE)  Demographics   Patient Name  [facility] Date of Study          04/29/2022   MRN           [id number]       Gender                 Female   Date of Birth 1970   Room Number            FIW-1953 Age           47 year(s)   Height:       66 inches    Referring Physician    Unknown Vanda   Weight:       144 pounds   Sonographer            Jessie Madera CLARICE   BSA:          1.74 m^2     Interpreting Physician Meg Bloom MD   BMI:          23.24 kg/m^2  Procedure Type of Study   TTE procedure:ECHO NO CONTRAST WITH DOP/COLR. Study Location: Portable Technical Quality: Adequate visualization Patient Status: Inpatient Contrast Medium: Bubble Study. Rhythm: Within normal limits HR: 57 bpm BP: 178/69 mmHg Indications:Stroke. Conclusions   Summary  Mitral valve leaflets are mildly thickened with preserved leaflet  mobility. Mild aortic stenosis. Mean gradient 14 mm hg  Tricuspid valve is structurally normal.  Mild tricuspid regurgitation. Moderately dilated left atrium. Normal intact intra-atrial septum was noted with no evidence of  significant intra-atrial communications by color flow Doppler or by  agitated saline study. Normal left ventricular size with preserved LV function and an estimated  ejection fraction of approximately 55-60%. Moderate concentric left ventricular hypertrophy. No regional wall motion abnormalities. Grade II diastolic dysfunction   Signature   ----------------------------------------------------------------  Electronically signed by Meg Bloom MD(Interpreting  physician) on 04/30/2022 11:02 AM  ----------------------------------------------------------------   Findings   Mitral Valve  Mitral valve leaflets are mildly thickened with preserved leaflet  mobility. Aortic Valve  Mild aortic stenosis. Mean gradient 14 mm hg   Tricuspid Valve  Tricuspid valve is structurally normal.  Mild tricuspid regurgitation. Pulmonic Valve  The pulmonic valve was not well visualized . Left Atrium  Moderately dilated left atrium. Normal intact intra-atrial septum was noted with no evidence of  significant intra-atrial communications by color flow Doppler or by  agitated saline study. Left Ventricle  Normal left ventricular size with preserved LV function and an estimated  ejection fraction of approximately 55-60%. Moderate concentric left ventricular hypertrophy. No regional wall motion abnormalities. Grade II diastolic dysfunction   Right Atrium  Normal right atrial dimension with no evidence of thrombus or mass noted. Right Ventricle  Normal right ventricular size with preserved RV function. Pericardial Effusion  No evidence of significant pericardial effusion is noted. Pleural Effusion  No evidence of pleural effusion. Allergies   - Penicillins.   - Stadol.   - Other allergy:(Lamisil Advanced). 2D Measurements and Calculations(cm)   LVIDd: 4.4 cm                       LVIDs: 3.14 cm  IVSd: 1.41 cm  LVPWd: 1.43 cm                      AO Root Dimension: 2.4 cm  Rt. Vent.  Dimension: 2.64 cm        LA Dimension: 3.8 cm  % Ejection Fraction: 55 %           LA Area: 24.3 cm^2  LA Volume: 86.1 ml                  LV Systolic dimension: 7.44TT  LA Volume Index: 49 ml/m^2          LV PW diastolic: 7.86ZL  LV dimension: 4.4 cm                LVOT diameter: 2 cm                                      RV Diastolic dimension: 5.21RN  LVEDV: 115 ml                       LVEDVI: 66 ml/m^2  LVESV:36.3 ml                       LVESVI: 21 ml/m^2  Cardic Output (CO): 5.53l/min  Doppler Measurements and Calculations   AV Peak Velocity:257 cm/s              MV Peak E-Wave: 82.3 cm/s  AV Mean Velocity:178 cm/s              MV Peak A-Wave: 76.7 cm/s  AV Peak Gradient: 26.42 mmHg           MV E/A Ratio: 1.07 %  AV Mean Gradient: 14 mmHg              MV Mean velocity: 64.8cm/s  AV Area (Continuity):1.85 cm^2         MV Peak Gradient: 2.71 mmHg  AV VTI:52.4 cm/s                       MV Mean gradient: 2 mmHg  TR Velocity:207 cm/s  TR Gradient:17.14 mmHg   MV E' septal velocity: 5.66cm/s  MV E' lateral velocity:5.66 cm/s         Assessment     ESRD  DM2 with nephropathy on long-term insulin, uncontrolled  HTN  Hyponatremia  Hypokalemia  Secondary Hyperparathyroidism  Anemia CKD  Seizure disorder  Hypothyroidism       Plan:  Dialysis per routine scheduling on ultrafiltration as tolerated  Wean ventilator per primary, planning weaning trials  Monitor labs  Neuro evaluation    Katty Solorio MD  05/03/22  11:29 AM

## 2022-05-03 NOTE — PROCEDURES
iDllan Baez is a 47 y.o. female patient. Intubation    Date/Time: 5/3/2022 2:50 PM  Performed by: Lou Sanchez MD  Authorized by: Lou Sanchez MD   Consent: The procedure was performed in an emergent situation. Patient identity confirmed: arm band and hospital-assigned identification number  Time out: Immediately prior to procedure a \"time out\" was called to verify the correct patient, procedure, equipment, support staff and site/side marked as required.   Indications: respiratory failure  Intubation method: video-assisted  Preoxygenation: BVM  Sedatives: etomidate  Paralytic: succinylcholine  Tube size: 7.0 mm  Tube type: cuffed  Number of attempts: 1  Cords visualized: yes  Post-procedure assessment: chest rise and CO2 detector  Breath sounds: equal  Cuff inflated: yes  ETT to lip: 24 cm  Tube secured with: ETT khan  Patient tolerance: patient tolerated the procedure well with no immediate complications  Comments: CXR ordered to confirm placement        Lou Sanchez MD  5/3/2022

## 2022-05-03 NOTE — PROGRESS NOTES
Pt weaned off Precedex now. Pt now placed on CPAP per vent. Will monitor for possible extubation today. Pt is awake, calm , following simple command to squeeze hand with L hand , no movement to R at this time remains flaccid. Movement to L foot, random movement to R, but not on command.  Electronically signed by Perez Maya RN on 5/3/2022 at 11:46 AM

## 2022-05-03 NOTE — PROGRESS NOTES
Pt now intubated by Dr Jaylin Bright, ETT 7 at the 24 lip. Secured with khan. Pt was given:   1445 Etomidate 20mg IV /102  RR 20 Sats 91% on BiPAP. Jessee Pinto ready to bag pt.  1447 Succinycholine 100mg IV  1452 Succinycholine 50mg IV  Propofol ordered to start. Vent  FIO2 35%. R 14. PEEP 5.  Electronically signed by Dominic Cochran RN on 5/3/2022 at 4:54 PM

## 2022-05-03 NOTE — PLAN OF CARE
Nutrition Problem #1: Inadequate oral intake  Intervention: Food and/or Nutrient Delivery: Continue NPO  Nutritional

## 2022-05-03 NOTE — PROGRESS NOTES
Comprehensive Nutrition Assessment    Type and Reason for Visit:  Reassess    Nutrition Recommendations/Plan:   1. Follow for starting po diet or alternate source of nutrition     Malnutrition Assessment:  Malnutrition Status: Moderate malnutrition (05/03/22 1350)    Context:  Acute Illness     Findings of the 6 clinical characteristics of malnutrition:  Energy Intake:  No significant decrease in energy intake  Weight Loss:  Greater than 2% over 1 week     Body Fat Loss: Moderate body fat loss     Muscle Mass Loss:  Mild muscle mass loss    Fluid Accumulation:  Mild Extremities   Strength:  Not Performed    Nutrition Assessment:    Pt has been extubated. TF has been stopped. Remains NPO. Nutrition Related Findings:    extubated Wound Type: Deep Tissue Injury       Current Nutrition Intake & Therapies:    Average Meal Intake: NPO  Average Supplements Intake: NPO  Diet NPO  ADULT TUBE FEEDING; Orogastric; Peptide Based; Continuous; 30; Yes; 5; Q 6 hours; 54; 0; Other (specify); no free water flush at this time    Anthropometric Measures:  Height: 5' 6\" (167.6 cm)  Ideal Body Weight (IBW): 130 lbs (59 kg)    Admission Body Weight: 165 lb (74.8 kg) (estimated)  Current Body Weight: 131 lb 14.4 oz (59.8 kg), 101.5 % IBW. Weight Source: Bed Scale  Current BMI (kg/m2): 21.3  Usual Body Weight: 144 lb (65.3 kg) (1/2022)  % Weight Change (Calculated): 0.5  BMI Categories: Normal Weight (BMI 18.5-24. 9)    Estimated Daily Nutrient Needs:  Energy Requirements Based On: Kcal/kg  Weight Used for Energy Requirements: Current  Energy (kcal/day): 6606-0094 kcals (20-25 kcals/kg)  Weight Used for Protein Requirements: Current  Protein (g/day): 98g  Method Used for Fluid Requirements: 1 ml/kcal  Fluid (ml/day): 0252-7107 ml    Nutrition Diagnosis:   · Inadequate oral intake related to acute injury/trauma,impaired respiratory function as evidenced by NPO or clear liquid status due to medical condition      Nutrition Interventions:   Food and/or Nutrient Delivery: Continue NPO  Nutrition Education/Counseling: No recommendation at this time  Coordination of Nutrition Care: Continue to monitor while inpatient  Plan of Care discussed with: nursing    Goals:  Previous Goal Met: No Progress toward Goal(s)  Goals: Meet at least 75% of estimated needs       Nutrition Monitoring and Evaluation:   Behavioral-Environmental Outcomes: None Identified  Food/Nutrient Intake Outcomes: Diet Advancement/Tolerance,Food and Nutrient Intake  Physical Signs/Symptoms Outcomes: Biochemical Data,Weight,Skin,Nutrition Focused Physical Findings,Fluid Status or Edema    Discharge Planning:     Too soon to determine     Giselle Delgador, MS, RD, LD  Contact: [phone]

## 2022-05-03 NOTE — PROGRESS NOTES
Results for Michael Ferreira (MRN [id number]) as of 5/3/2022 18:33   Ref.  Range 5/3/2022 18:31   Hemoglobin, Art, Extended Latest Ref Range: 12.0 - 16.0 g/dL 9.7 (L)   pH, Arterial Latest Ref Range: 7.350 - 7.450  7.510 (HH)   pCO2, Arterial Latest Ref Range: 35.0 - 45.0 mmHg 36.0   pO2, Arterial Latest Ref Range: 80.0 - 100.0 mmHg 96.0   HCO3, Arterial Latest Ref Range: 22.0 - 26.0 mmol/L 28.7 (H)   Base Excess, Arterial Latest Ref Range: -2.0 - 2.0 mmol/L 5.4 (H)   O2 Sat, Arterial Latest Ref Range: >92 % 95.4   O2 Content, Arterial Latest Ref Range: Not Established mL/dL 13.2   Methemoglobin, Arterial Latest Ref Range: <1.5 % 1.4   Carboxyhgb, Arterial Latest Ref Range: 0.0 - 5.0 % 1.2       Pt on AC/ 14 35% +5      RR AT+    Reported to [employer] RN

## 2022-05-03 NOTE — PROGRESS NOTES
Dr Kathi Avendano notified of current Vent status pt on CPAP and tolerating. Precedex off. OK to extubate. Respiratory has been notified.  Electronically signed by Milagro Arana RN on 5/3/2022 at 1:19 PM

## 2022-05-04 ENCOUNTER — APPOINTMENT (OUTPATIENT)
Dept: GENERAL RADIOLOGY | Age: 52
DRG: 004 | End: 2022-05-04
Payer: MEDICARE

## 2022-05-04 PROBLEM — E43 SEVERE MALNUTRITION (HCC): Status: ACTIVE | Noted: 2022-05-04

## 2022-05-04 LAB
ADENOVIRUS BY PCR: NOT DETECTED
ALBUMIN SERPL-MCNC: 3.1 G/DL (ref 3.5–5.2)
ALLENS TEST: ABNORMAL
ALP BLD-CCNC: 114 U/L (ref 35–104)
ALT SERPL-CCNC: 8 U/L (ref 5–33)
ANA IGG, ELISA: ABNORMAL
ANION GAP SERPL CALCULATED.3IONS-SCNC: 18 MMOL/L (ref 7–19)
ANTICARDIOLIPIN IGA ANTIBODY: <10 APL
ANTITHROMBIN ANTIGEN: 90 % (ref 82–136)
AST SERPL-CCNC: 15 U/L (ref 5–32)
BASE EXCESS ARTERIAL: 7.8 MMOL/L (ref -2–2)
BASOPHILS ABSOLUTE: 0 K/UL (ref 0–0.2)
BASOPHILS RELATIVE PERCENT: 0.2 % (ref 0–1)
BILIRUB SERPL-MCNC: 0.3 MG/DL (ref 0.2–1.2)
BORDETELLA PARAPERTUSSIS BY PCR: NOT DETECTED
BORDETELLA PERTUSSIS BY PCR: NOT DETECTED
BUN BLDV-MCNC: 51 MG/DL (ref 6–20)
C3 COMPLEMENT: 131 MG/DL (ref 90–180)
C4 COMPLEMENT: 42 MG/DL (ref 10–40)
CALCIUM SERPL-MCNC: 9.4 MG/DL (ref 8.6–10)
CARBOXYHEMOGLOBIN ARTERIAL: 1.3 % (ref 0–5)
CHLAMYDOPHILIA PNEUMONIAE BY PCR: NOT DETECTED
CHLORIDE BLD-SCNC: 89 MMOL/L (ref 98–111)
CO2: 26 MMOL/L (ref 22–29)
CORONAVIRUS 229E BY PCR: NOT DETECTED
CORONAVIRUS HKU1 BY PCR: NOT DETECTED
CORONAVIRUS NL63 BY PCR: NOT DETECTED
CORONAVIRUS OC43 BY PCR: NOT DETECTED
CREAT SERPL-MCNC: 3.9 MG/DL (ref 0.5–0.9)
EOSINOPHILS ABSOLUTE: 0 K/UL (ref 0–0.6)
EOSINOPHILS RELATIVE PERCENT: 0 % (ref 0–5)
FIO2: 35 %
GFR AFRICAN AMERICAN: 15
GFR NON-AFRICAN AMERICAN: 12
GLUCOSE BLD-MCNC: 251 MG/DL (ref 70–99)
GLUCOSE BLD-MCNC: 253 MG/DL (ref 70–99)
GLUCOSE BLD-MCNC: 300 MG/DL (ref 70–99)
GLUCOSE BLD-MCNC: 360 MG/DL (ref 74–109)
GLUCOSE BLD-MCNC: 449 MG/DL (ref 70–99)
HCO3 ARTERIAL: 30.8 MMOL/L (ref 22–26)
HCT VFR BLD CALC: 32.2 % (ref 37–47)
HEMOGLOBIN, ART, EXTENDED: 10 G/DL (ref 12–16)
HEMOGLOBIN: 9.8 G/DL (ref 12–16)
HUMAN METAPNEUMOVIRUS BY PCR: NOT DETECTED
HUMAN RHINOVIRUS/ENTEROVIRUS BY PCR: NOT DETECTED
IMMATURE GRANULOCYTES #: 0 K/UL
INFLUENZA A BY PCR: NOT DETECTED
INFLUENZA B BY PCR: NOT DETECTED
INR BLD: 1.11 (ref 0.88–1.18)
LYMPHOCYTES ABSOLUTE: 0.5 K/UL (ref 1.1–4.5)
LYMPHOCYTES RELATIVE PERCENT: 5.1 % (ref 20–40)
MAGNESIUM: 2.7 MG/DL (ref 1.6–2.6)
MCH RBC QN AUTO: 28.5 PG (ref 27–31)
MCHC RBC AUTO-ENTMCNC: 30.4 G/DL (ref 33–37)
MCV RBC AUTO: 93.6 FL (ref 81–99)
METHEMOGLOBIN ARTERIAL: 0.7 %
METHYLMALONIC ACID: 0.51 UMOL/L (ref 0–0.4)
MONOCYTES ABSOLUTE: 0.2 K/UL (ref 0–0.9)
MONOCYTES RELATIVE PERCENT: 2.4 % (ref 0–10)
MYCOPLASMA PNEUMONIAE BY PCR: NOT DETECTED
NEUTROPHILS ABSOLUTE: 8.1 K/UL (ref 1.5–7.5)
NEUTROPHILS RELATIVE PERCENT: 92 % (ref 50–65)
O2 CONTENT ARTERIAL: 13.8 ML/DL
O2 SAT, ARTERIAL: 96.9 %
O2 THERAPY: ABNORMAL
PARAINFLUENZA VIRUS 1 BY PCR: NOT DETECTED
PARAINFLUENZA VIRUS 2 BY PCR: NOT DETECTED
PARAINFLUENZA VIRUS 3 BY PCR: NOT DETECTED
PARAINFLUENZA VIRUS 4 BY PCR: NOT DETECTED
PCO2 ARTERIAL: 36 MMHG (ref 35–45)
PDW BLD-RTO: 18.6 % (ref 11.5–14.5)
PERFORMED ON: ABNORMAL
PH ARTERIAL: 7.54 (ref 7.35–7.45)
PHOSPHORUS: 5 MG/DL (ref 2.5–4.5)
PLATELET # BLD: 202 K/UL (ref 130–400)
PMV BLD AUTO: 9.3 FL (ref 9.4–12.3)
PO2 ARTERIAL: 119 MMHG (ref 80–100)
POSITIVE END EXP PRESS: 5
POTASSIUM SERPL-SCNC: 4.4 MMOL/L (ref 3.5–5)
POTASSIUM, WHOLE BLOOD: 4.1
PROTEIN S, FUNCTIONAL: 86 % (ref 57–131)
PROTHROMBIN TIME: 14.3 SEC (ref 12–14.6)
RBC # BLD: 3.44 M/UL (ref 4.2–5.4)
RESPIRATORY SYNCYTIAL VIRUS BY PCR: NOT DETECTED
RHEUMATOID FACTOR: 12 IU/ML (ref 0–14)
SARS-COV-2, PCR: NOT DETECTED
SODIUM BLD-SCNC: 133 MMOL/L (ref 136–145)
TOTAL PROTEIN: 6.2 G/DL (ref 6.6–8.7)
VT MECHANICAL: 450 %
WBC # BLD: 8.9 K/UL (ref 4.8–10.8)

## 2022-05-04 PROCEDURE — 6360000002 HC RX W HCPCS: Performed by: INTERNAL MEDICINE

## 2022-05-04 PROCEDURE — 83735 ASSAY OF MAGNESIUM: CPT

## 2022-05-04 PROCEDURE — 6370000000 HC RX 637 (ALT 250 FOR IP): Performed by: INTERNAL MEDICINE

## 2022-05-04 PROCEDURE — 99233 SBSQ HOSP IP/OBS HIGH 50: CPT | Performed by: PSYCHIATRY & NEUROLOGY

## 2022-05-04 PROCEDURE — 36415 COLL VENOUS BLD VENIPUNCTURE: CPT

## 2022-05-04 PROCEDURE — 2580000003 HC RX 258: Performed by: INTERNAL MEDICINE

## 2022-05-04 PROCEDURE — 36600 WITHDRAWAL OF ARTERIAL BLOOD: CPT

## 2022-05-04 PROCEDURE — 2500000003 HC RX 250 WO HCPCS: Performed by: INTERNAL MEDICINE

## 2022-05-04 PROCEDURE — 82803 BLOOD GASES ANY COMBINATION: CPT

## 2022-05-04 PROCEDURE — 71045 X-RAY EXAM CHEST 1 VIEW: CPT

## 2022-05-04 PROCEDURE — 94003 VENT MGMT INPAT SUBQ DAY: CPT

## 2022-05-04 PROCEDURE — 99232 SBSQ HOSP IP/OBS MODERATE 35: CPT

## 2022-05-04 PROCEDURE — 2100000000 HC CCU R&B

## 2022-05-04 PROCEDURE — 2700000000 HC OXYGEN THERAPY PER DAY

## 2022-05-04 PROCEDURE — 85610 PROTHROMBIN TIME: CPT

## 2022-05-04 PROCEDURE — 84100 ASSAY OF PHOSPHORUS: CPT

## 2022-05-04 PROCEDURE — 8010000000 HC HEMODIALYSIS ACUTE INPT

## 2022-05-04 PROCEDURE — 80053 COMPREHEN METABOLIC PANEL: CPT

## 2022-05-04 PROCEDURE — 82947 ASSAY GLUCOSE BLOOD QUANT: CPT

## 2022-05-04 PROCEDURE — 0202U NFCT DS 22 TRGT SARS-COV-2: CPT

## 2022-05-04 PROCEDURE — 85025 COMPLETE CBC W/AUTO DIFF WBC: CPT

## 2022-05-04 RX ORDER — INSULIN GLARGINE 100 [IU]/ML
20 INJECTION, SOLUTION SUBCUTANEOUS 2 TIMES DAILY
Status: DISCONTINUED | OUTPATIENT
Start: 2022-05-04 | End: 2022-05-17

## 2022-05-04 RX ADMIN — INSULIN LISPRO 6 UNITS: 100 INJECTION, SOLUTION INTRAVENOUS; SUBCUTANEOUS at 12:14

## 2022-05-04 RX ADMIN — SODIUM CHLORIDE, PRESERVATIVE FREE 20 MG: 5 INJECTION INTRAVENOUS at 07:57

## 2022-05-04 RX ADMIN — VANCOMYCIN HYDROCHLORIDE 125 MG: 1 INJECTION, POWDER, LYOPHILIZED, FOR SOLUTION INTRAVENOUS at 05:31

## 2022-05-04 RX ADMIN — Medication 10 ML: at 21:49

## 2022-05-04 RX ADMIN — HEPARIN SODIUM 5000 UNITS: 5000 INJECTION INTRAVENOUS; SUBCUTANEOUS at 15:49

## 2022-05-04 RX ADMIN — METHYLPREDNISOLONE SODIUM SUCCINATE 40 MG: 40 INJECTION, POWDER, FOR SOLUTION INTRAMUSCULAR; INTRAVENOUS at 15:50

## 2022-05-04 RX ADMIN — VANCOMYCIN HYDROCHLORIDE 125 MG: 1 INJECTION, POWDER, LYOPHILIZED, FOR SOLUTION INTRAVENOUS at 18:10

## 2022-05-04 RX ADMIN — METHYLPREDNISOLONE SODIUM SUCCINATE 40 MG: 40 INJECTION, POWDER, FOR SOLUTION INTRAMUSCULAR; INTRAVENOUS at 21:47

## 2022-05-04 RX ADMIN — LEVETIRACETAM 500 MG: 5 INJECTION INTRAVENOUS at 13:14

## 2022-05-04 RX ADMIN — INSULIN LISPRO 3 UNITS: 100 INJECTION, SOLUTION INTRAVENOUS; SUBCUTANEOUS at 21:48

## 2022-05-04 RX ADMIN — HEPARIN SODIUM 5000 UNITS: 5000 INJECTION INTRAVENOUS; SUBCUTANEOUS at 07:56

## 2022-05-04 RX ADMIN — ANORECTAL OINTMENT: 15.7; .44; 24; 20.6 OINTMENT TOPICAL at 05:31

## 2022-05-04 RX ADMIN — LEVETIRACETAM 500 MG: 5 INJECTION INTRAVENOUS at 01:13

## 2022-05-04 RX ADMIN — VANCOMYCIN HYDROCHLORIDE 125 MG: 1 INJECTION, POWDER, LYOPHILIZED, FOR SOLUTION INTRAVENOUS at 12:14

## 2022-05-04 RX ADMIN — INSULIN GLARGINE 20 UNITS: 100 INJECTION, SOLUTION SUBCUTANEOUS at 21:48

## 2022-05-04 RX ADMIN — CHLORHEXIDINE GLUCONATE 15 ML: 1.2 RINSE ORAL at 08:00

## 2022-05-04 RX ADMIN — PROPOFOL 35 MCG/KG/MIN: 10 INJECTION, EMULSION INTRAVENOUS at 10:22

## 2022-05-04 RX ADMIN — PROPOFOL 40 MCG/KG/MIN: 10 INJECTION, EMULSION INTRAVENOUS at 03:31

## 2022-05-04 RX ADMIN — HYDRALAZINE HYDROCHLORIDE 100 MG: 50 TABLET, FILM COATED ORAL at 05:31

## 2022-05-04 RX ADMIN — INSULIN GLARGINE 20 UNITS: 100 INJECTION, SOLUTION SUBCUTANEOUS at 09:07

## 2022-05-04 RX ADMIN — CHLORHEXIDINE GLUCONATE 15 ML: 1.2 RINSE ORAL at 21:49

## 2022-05-04 RX ADMIN — METHYLPREDNISOLONE SODIUM SUCCINATE 40 MG: 40 INJECTION, POWDER, FOR SOLUTION INTRAMUSCULAR; INTRAVENOUS at 05:31

## 2022-05-04 RX ADMIN — ASPIRIN 81 MG 81 MG: 81 TABLET ORAL at 07:57

## 2022-05-04 RX ADMIN — HEPARIN SODIUM 5000 UNITS: 5000 INJECTION INTRAVENOUS; SUBCUTANEOUS at 21:47

## 2022-05-04 RX ADMIN — Medication 10 ML: at 08:09

## 2022-05-04 RX ADMIN — VANCOMYCIN HYDROCHLORIDE 125 MG: 1 INJECTION, POWDER, LYOPHILIZED, FOR SOLUTION INTRAVENOUS at 00:13

## 2022-05-04 RX ADMIN — INSULIN LISPRO 12 UNITS: 100 INJECTION, SOLUTION INTRAVENOUS; SUBCUTANEOUS at 07:56

## 2022-05-04 RX ADMIN — INSULIN LISPRO 8 UNITS: 100 INJECTION, SOLUTION INTRAVENOUS; SUBCUTANEOUS at 18:09

## 2022-05-04 RX ADMIN — PROPOFOL 40 MCG/KG/MIN: 10 INJECTION, EMULSION INTRAVENOUS at 17:25

## 2022-05-04 ASSESSMENT — PULMONARY FUNCTION TESTS
PIF_VALUE: 17
PIF_VALUE: 26
PIF_VALUE: 22
PIF_VALUE: 17
PIF_VALUE: 20
PIF_VALUE: 26
PIF_VALUE: 17
PIF_VALUE: 17
PIF_VALUE: 26
PIF_VALUE: 26
PIF_VALUE: 17
PIF_VALUE: 18
PIF_VALUE: 26
PIF_VALUE: 20
PIF_VALUE: 25
PIF_VALUE: 17
PIF_VALUE: 25
PIF_VALUE: 19
PIF_VALUE: 26
PIF_VALUE: 26
PIF_VALUE: 17
PIF_VALUE: 21
PIF_VALUE: 17
PIF_VALUE: 16
PIF_VALUE: 25
PIF_VALUE: 17
PIF_VALUE: 16
PIF_VALUE: 18
PIF_VALUE: 16
PIF_VALUE: 15
PIF_VALUE: 17
PIF_VALUE: 26
PIF_VALUE: 26
PIF_VALUE: 16
PIF_VALUE: 23
PIF_VALUE: 17
PIF_VALUE: 26
PIF_VALUE: 19
PIF_VALUE: 25
PIF_VALUE: 17

## 2022-05-04 ASSESSMENT — PAIN SCALES - GENERAL
PAINLEVEL_OUTOF10: 0

## 2022-05-04 NOTE — PROGRESS NOTES
Comprehensive Nutrition Assessment    Type and Reason for Visit:  Reassess    Nutrition Recommendations/Plan:   1. Monitor Propofol and adjust EN rate as needed     Malnutrition Assessment:  Malnutrition Status:  Severe malnutrition (05/04/22 0802)    Context:  Acute Illness     Findings of the 6 clinical characteristics of malnutrition:  Energy Intake:  No significant decrease in energy intake  Weight Loss:  Greater than 2% over 1 week     Body Fat Loss: Moderate body fat loss Orbital,Buccal region   Muscle Mass Loss: Moderate muscle mass loss Thigh (quadraceps),Temples (temporalis)  Fluid Accumulation:  No significant fluid accumulation Extremities   Strength:  Not Performed    Nutrition Assessment:    Pt unable to tolerate extubation, back on vent. Propofol 14.4ml/hr. To decrease TF to 47ml/hr Vital High Protein and continue no free water flush. Na+ level 133    Nutrition Related Findings:    reintubated. Now receiving Propofol Wound Type: Deep Tissue Injury       Current Nutrition Intake & Therapies:    Average Meal Intake: NPO  Average Supplements Intake: NPO  Current Tube Feeding (TF) Orders:  · Feeding Route: Orogastric  · Formula: Peptide Based High Protein  · Schedule: Continuous  · Feeding Regimen: Vital High Protein at 47ml/hr  · Additives/Modulars: None  · Water Flushes: none  · Current TF & Flush Orders Provides: Vital High Protein at 54ml/hr = 1296 kcals with 113g protein, 143g CHO and 1083ml free water from formula  · Goal TF & Flush Orders Provides: Vital High Protein at 47ml/hr = 1128 kcals wtih 98g protein, 123g CHO and 945ml free water from formula. Propofol adds another 380 kcals      Anthropometric Measures:  Height: 5' 6\" (167.6 cm)  Ideal Body Weight (IBW): 130 lbs (59 kg)    Admission Body Weight: 165 lb (74.8 kg) (estimated)  Current Body Weight: 128 lb 1.4 oz (58.1 kg), 98.5 % IBW.  Weight Source: Bed Scale  Current BMI (kg/m2): 20.7  Usual Body Weight: 144 lb (65.3 kg) (1/2022)  % Weight Change (Calculated): 0.5  BMI Categories: Normal Weight (BMI 18.5-24. 9)    Estimated Daily Nutrient Needs:  Energy Requirements Based On: Kcal/kg  Weight Used for Energy Requirements: Current  Energy (kcal/day): 7664-6615 kcals (20-25 kcals/kg)  Weight Used for Protein Requirements: Current  Protein (g/day): 98g  Method Used for Fluid Requirements: 1 ml/kcal  Fluid (ml/day): 7998-9808 ml    Nutrition Diagnosis:   · Inadequate oral intake related to acute injury/trauma,impaired respiratory function as evidenced by NPO or clear liquid status due to medical condition,intubation,nutrition support - enteral nutrition      Nutrition Interventions:   Food and/or Nutrient Delivery: Continue NPO,Continue Current Tube Feeding  Nutrition Education/Counseling: Education not indicated  Coordination of Nutrition Care: Continue to monitor while inpatient  Plan of Care discussed with: nursing    Goals:  Previous Goal Met: Progressing toward Goal(s)  Goals: Meet at least 75% of estimated needs       Nutrition Monitoring and Evaluation:   Behavioral-Environmental Outcomes: None Identified  Food/Nutrient Intake Outcomes: Enteral Nutrition Intake/Tolerance  Physical Signs/Symptoms Outcomes: Biochemical Data,Diarrhea,Fluid Status or Edema,Weight,Skin,Nutrition Focused Physical Findings    Discharge Planning:     Too soon to determine     Dorena Canavan, MS, RD, LD  Contact: [phone]

## 2022-05-04 NOTE — FLOWSHEET NOTE
05/03/22 1943   Encounter Summary   Encounter Overview/Reason  Spiritual/Emotional Needs   Service Provided For: Patient; Family  (pt's partner for 30 years)   Referral/Consult From: Family   Complexity of Encounter Moderate   Begin Time 1600  (1600)   End Time  1645   Total Time Calculated 45 min   Spiritual/Emotional needs   Type Spiritual Support   Assessment/Intervention/Outcome   Assessment Complicated grieving;Coping   Intervention Active listening;Explored/Affirmed feelings, thoughts, concerns   Outcome Coping;Expressed Gratitude     Pt intubated in CCU. Pt's partner of 30 years Mary Flores called to state that \"pt's dying. \" Requested for her son who is in the [employer] in [facility] be allowed to see pt's before pt's passing. Jasmyn Tomlin might be coming to see pt tomorrow. This  encourage him to communicate with pt's bedside. Discuss pt care with pt's nurse Cabrera Murphy, SW is not available at this time. Pt sent the massage to Dr. Jeff Mcdonough. Prayed for the pt. Will follow up.   Electronically signed by Victor Hugo Schneider on 5/3/2022 at 7:56 PM

## 2022-05-04 NOTE — PROGRESS NOTES
Pt on VC,14,450,35% + 5 Peep  Right radial puncture  reported to [address] [place] [address] CCU

## 2022-05-04 NOTE — PROGRESS NOTES
[facility]  [facility]  Follow-up Progress Note       NAME:  Ina Zamarripa  MEDICAL RECORD NUMBER:  [id number]  AGE:  47 y.o. GENDER:  female  :  1970  TODAY'S DATE:  2022    Subjective:   Wound Identification:  Wound Type: pressure  Contributing Factors: chronic pressure and decreased mobility        Patient Goal of Care:  [] Wound Healing  [] Odor Control  [] Palliative Care  [] Pain Control   [] Other:     Objective:    BP (!) 141/61   Pulse 79   Temp 97.1 °F (36.2 °C) (Temporal)   Resp 14   Ht 5' 6\" (1.676 m)   Wt 128 lb 1.4 oz (58.1 kg)   SpO2 99%   BMI 20.67 kg/m²   Marek Risk Score: Marek Scale Score: 12  Assessment:   Measurements:  Wound 22 Sacrum (Active)   Wound Image   22 1500   Wound Etiology Deep tissue/Injury 22 1500   Dressing Status New dressing applied 22 1500   Wound Cleansed Soap and water 22 1500   Dressing/Treatment Silicone border  1500   Dressing Change Due 22 1500   Wound Length (cm) 3.5 cm 22 1500   Wound Width (cm) 4 cm 22 1500   Wound Depth (cm) 0 cm 22 0538   Wound Surface Area (cm^2) 14 cm^2 22 1500   Change in Wound Size % (l*w) -1300 22 1500   Wound Volume (cm^3) 0 cm^3 22 0538   Wound Assessment Purple/maroon;Slough 22 1500   Drainage Amount None 22 1500   Drainage Description Sanguinous 22 0000   Odor None 22 0000   Tracie-wound Assessment Intact; Denuded 22 1500   Margins Undefined edges 22 0000   Number of days: 8       Response to treatment:  Well tolerated by patient.      Pain Assessment:  Severity:  0 / 10  Quality of pain: N/A  Wound Pain Timing/Severity: none  Premedicated: N/A  Plan:   Plan of Care: Wound 22 Sacrum-Dressing/Treatment: Silicone border    Specialty Bed Required : Yes   [x] Low Air Loss (on ICU [employer])  [] Pressure Redistribution  [] Fluid Immersion  [] Bariatric  [] Total Pressure Relief  [] Other:     Current Diet: Diet NPO  ADULT TUBE FEEDING; Orogastric; Peptide Based High Protein; Continuous; 47; No; 0; Other (specify); no water flush  Dietician consult:  Yes    Discharge Plan:  Placement for patient upon discharge: TBD   Patient appropriate for Outpatient [address]: TBD    Referrals:  []   [] [address] [employer] [facility]  [] Supplies  [] Other    Patient/Caregiver Teaching:  Level of patient/caregiver understanding able to:   [] Indicates understanding       [] Needs reinforcement  [] Unsuccessful      [] Verbal Understanding  [] Demonstrated understanding       [] No evidence of learning  [] Refused teaching         [x] N/A       The deep tissue injury at the coccyx is demarcating and about slough to 30% of wound bed now. Pressure injury is evolving into an unstageable pressure injury. Stool is now better contained with fecal management system. Recommendation:     COCCYX: Change to using silicone bordered sacral foam dressings. Change daily and if soiled, saturated, or dislodged. If dressing needs to be changed more than once between daily changes switch to covering pressure injury with Calmoseptine 2 times daily.     Electronically signed by Ino Barahona RN, [facility] on 5/4/2022 at 3:21 PM

## 2022-05-04 NOTE — PROGRESS NOTES
Pt intubated on ventilator  FIO2 35%. PEEP 5 Sats % at present. Lungs CTA. Pt has productive cough with yellow thin sputum. Minimal secretions suctioned from ET. ET at 24 lip. OG present at 60cm with high protein TF at 54ml/hr no flush. No residual noted. Clear oral secretions note at this time. Pt did clench mouth with attempted oral care, but after repeated explaining that I was cleaning her mouth, she opened mouth slightly and closed over [place]auer for suctioning. Pt HR ST at present. BP is elevated 213/63. Pulses palpable. Murmur noted. Lungs CTA , diminished L compared to R. No edema noted. Rectal tube present with loose brown stool. Pt did attempt to squeeze with L hand on command. Response slow, only slight movement to fingers. Precedex at 0.8mcg/kg/hr at present. Pt did move L foot randomly and slight movement to R foot randomly. Pt responds to pain in all extremeties. Pupils sluggish reactive. L pupil is slightly larger than R. Restrain on L wrist. Pt does reach for ET when removed for ROM.  Electronically signed by Elizabeth Olmos RN on 5/3/2022 at 9:03 PM

## 2022-05-04 NOTE — PROGRESS NOTES
Dialysis completed. 2100ml removed. /55 at present. HR 78 SR. RR 14. Sats 100% on FIO2 35% per vent. Propofol infusing at 40mcg/kg/min. Pt moving feet and occasioally L hand arm. Does not respond to commands. Remains sedated.  Electronically signed by Ofe Sabillon RN on 5/4/2022 at 12:11 PM

## 2022-05-04 NOTE — PROGRESS NOTES
Nephrology ([facility]) Progress Note    Patient:  Jj Henley  YOB: 1970  Date of Service: 5/4/2022  MRN: [id number]   Acct: [de-identified]   Primary Care Physician: ROMINA Kendall  Advance Directive: Full Code  Admit Date: 4/24/2022       Hospital Day: 10  Referring Provider: Gildardo Duane, DO    Patient independently seen and examined, Chart, Consults, Notes, Operative notes, Labs, Cardiology, and Radiology studies reviewed as available. Subjective:  Jj Henley is a 47 y.o. female for whom we were consulted for evaluation and treatment of end-stage renal disease. Patient also has a history of seizure disorder, type 2 diabetes, hypertension, frequent hospitalization with noncompliance and poorly controlled diabetes. Patient was brought to the emergency room after she was found to have unresponsiveness and witnessed grand mal seizure. She was brought in by ambulance. On arrival in the emergency room, she was found to be severely hyperglycemic and had a urinary tract infection. Patient was intubated, sedated and treated by neurology for grand mal seizure. She was also receiving IV antibiotics and insulin drip. Her blood sugar control had significantly improved. On April 25, she has received emergent hemodialysis treatment for correction of volume status as well as hyponatremia. She was moved from ICU to CCU. Today, patient required reintubation due to stridor. Patient remains on the ventilator and unable to participate in the history and physical examination. Events reviewed with nursing at the bedside. No family present.       Dialysis   Pt was seen on renal replacement therapy and I have personally seen and evaluated the patient and directed the therapy  Modality: Hemodialysis  Access: Arterial Venous Fistula  Location: left upper  QB: 450  QD: 700  UF: 530      Allergies:  Penicillins, Lamisil advanced [terbinafine], Reglan [metoclopramide], and Stadol [butorphanol]    Medicines:  Current Facility-Administered Medications   Medication Dose Route Frequency Provider Last Rate Last Admin    insulin glargine (LANTUS) injection vial 20 Units  20 Units SubCUTAneous BID Erica Guevara DO   20 Units at 05/04/22 8254    hydrALAZINE (APRESOLINE) injection 10 mg  10 mg IntraVENous Q6H PRN Dustin Solis MD   10 mg at 05/03/22 1434    aspirin chewable tablet 81 mg  81 mg Oral Daily Dustin Solis MD   81 mg at 05/04/22 0757    racepinephrine HCl (VAPONEFPRIN) 2.25 % nebulizer solution NEBU 11.25 mg  11.25 mg Nebulization Q4H PRN Dustin Solis MD   11.25 mg at 05/03/22 1408    sodium chloride nebulizer 0.9 % solution 3 mL  3 mL Nebulization Q4H PRN Dustin Solis MD        methylPREDNISolone sodium (SOLU-MEDROL) injection 40 mg  40 mg IntraVENous Q8H Dustin Solis MD   40 mg at 05/04/22 0531    propofol injection  5-50 mcg/kg/min IntraVENous Continuous Dustin Solis MD 14.4 mL/hr at 05/04/22 1050 40 mcg/kg/min at 05/04/22 1050    heparin (porcine) injection 5,000 Units  5,000 Units SubCUTAneous TID Dustin Solis MD   5,000 Units at 05/04/22 1683    Peppermint Spirit SPRT   Does not apply PRN Erica Iveyar, DO        niCARdipine (CARDENE) 25 mg in dextrose 5 % 250 mL infusion  2.5-15 mg/hr IntraVENous Continuous Destiny Schrader MD   Stopped at 05/02/22 1622    dexmedetomidine (PRECEDEX) 400 mcg in sodium chloride 0.9 % 100 mL infusion  0.1-1.5 mcg/kg/hr IntraVENous Continuous Erica Poplar, DO   Stopped at 05/03/22 1143    menthol-zinc oxide (CALMOSEPTINE) 0.44-20.6 % ointment   Topical BID Dustin Solis MD   Given at 05/04/22 0531    sodium phosphate 10.5 mmol in sodium chloride 0.9 % 250 mL IVPB  0.16 mmol/kg IntraVENous PRN Patience WalterDO ruby        Or    sodium phosphate 21 mmol in sodium chloride 0.9 % 250 mL IVPB  0.32 mmol/kg IntraVENous PRN Patience WalterDO ruby        insulin lispro (HUMALOG) injection vial 0-12 Units  0-12 Units SubCUTAneous TID WC Fran Bowen, DO   12 Units at 05/04/22 0756    insulin lispro (HUMALOG) injection vial 0-6 Units  0-6 Units SubCUTAneous Nightly Fran Bowen, DO   4 Units at 05/03/22 2026    vancomycin (VANCOCIN) oral solution 125 mg  125 mg Oral 4 times per day Pinkey Jessi, DO   125 mg at 05/04/22 0531    hydrALAZINE (APRESOLINE) tablet 100 mg  100 mg Oral 3 times per day Patience Walter, DO   100 mg at 05/04/22 0531    glucagon (rDNA) injection 1 mg  1 mg IntraMUSCular PRN Fran Frazierering, DO        dextrose 5 % solution  100 mL/hr IntraVENous PRN Fran Bowen, DO        glucose chewable tablet 16 g  4 tablet Oral PRN Fran Bowen, DO        dextrose bolus (hypoglycemia) 10% 125 mL  125 mL IntraVENous PRN Fran Bowen, DO   Stopped at 04/25/22 6855    Or    dextrose bolus (hypoglycemia) 10% 250 mL  250 mL IntraVENous PRN Fran Bowen, DO        sodium chloride flush 0.9 % injection 5-40 mL  5-40 mL IntraVENous 2 times per day Fran Bowen, DO   10 mL at 05/04/22 0809    sodium chloride flush 0.9 % injection 5-40 mL  5-40 mL IntraVENous PRN Fran Bowen, DO        0.9 % sodium chloride infusion   IntraVENous PRN Fran Bowen, DO   Stopped at 05/03/22 2005    LORazepam (ATIVAN) injection 1 mg  1 mg IntraVENous Q5 Min PRN Fran Frazierering, DO        ondansetron (ZOFRAN-ODT) disintegrating tablet 4 mg  4 mg Oral Q8H PRN Fran Bowen, DO        Or    [facility]ron [facility] injection 4 mg  4 mg IntraVENous Q6H PRN Pinkey Karinering, DO        polyethylene glycol [facility]) packet 17 g  17 g Oral Daily PRN Pinkey Jessi, DO        acetaminophen (TYLENOL) tablet 650 mg  650 mg Oral Q6H PRN Pinkey Hollering, DO   650 mg at 04/25/22 1313    Or    acetaminophen (TYLENOL) suppository 650 mg  650 mg Rectal Q6H PRN Pinkey Karinering, DO        levETIRAcetam (KEPPRA) 500 mg/100 mL IVPB  500 mg IntraVENous [address], DO   Stopped at 05/04/22 0128    chlorhexidine (PERIDEX) 0.12 % solution 15 mL  15 mL Mouth/Throat BID Graeme Lava, DO   15 mL at 05/04/22 0800    famotidine (PEPCID) 20 mg in sodium chloride (PF) 10 mL injection  20 mg IntraVENous Daily Graeme Lava, DO   20 mg at 05/04/22 2051    magic butt cream   Topical Q4H PRN Graeme Lava, DO   Given at 05/03/22 0940    magnesium sulfate 1000 mg in dextrose 5% 100 mL IVPB  1,000 mg IntraVENous PRN Patience Walter, DO        potassium bicarb-citric acid (EFFER-K) effervescent tablet 40 mEq  40 mEq Oral PRN Patience Walter, DO        Or    potassium chloride 10 mEq/100 mL IVPB (Peripheral Line)  10 mEq IntraVENous PRN Cathlyn [place], DO   Stopped at 04/25/22 0936       Past Medical History:  Past Medical History:   Diagnosis Date    Acute ischemic stroke ([facility] 4/30/2022    Arthritis     Chronic kidney disease, stage 5, kidney failure (HCC)     Closed fracture of right shoulder girdle, with routine healing, subsequent encounter     DM (diabetes mellitus) type II controlled with renal manifestation ([facility] 06/1993    Gastroparesis     GERD (gastroesophageal reflux disease)     Hemodialysis patient ([facility]     dialysis on tues, thur, and sat at [facility]    Hypertension     Hypothyroid     Nasal congestion     recent    Neuropathy     Noncompliance with medications     Palliative care patient 10/08/2019    Restless leg syndrome        Past Surgical History:  Past Surgical History:   Procedure Laterality Date    BREAST SURGERY Left 2008    infected milk gland removed    BREAST SURGERY Right 5/25/2021    WEDGE EXCISION RIGHT BREAST DUCT WITH LACRIMAL DUCT PROBE, PEC BLOCK performed by April Stoner MD at [address] [place] [address], LAPAROSCOPIC      2008    COLONOSCOPY Left 9/17/2020    Dr Matheus Allen hepatic flexure-Severe tortuosity with severe spasming, 1 yr recall    DIALYSIS FISTULA CREATION Left 1/25/2019    REVISION LEFT UPPER EXTREMITY AV ACCESS WITH LEFT BRACHIAL ARTERY TO LEFT AXILLARY VEIN WITH  INTERPOSITIONAL ARTEGRAFT   performed by Judit Tinoco MD at [address]  2012    [address] Right 1/25/2019    INSERTION OF RIGHT INTERNAL JUGULAR HEMODIALYSIS CATHETER performed by Judit Tinoco MD at [address] [facility]  08/17/2018    1.  Moderately increased stainable iron identified by special stain in Kupffer cells and occasional hepatocytes. Negative for evidence of significant portal or lobular inflammation. Negative for evidence of significant fibrosis    [place] COLONOSCOPY W/BIOPSY SINGLE/MULTIPLE N/A 10/20/2017    Dr Omar Ochoa prep-Tubular AP (-) dysplasia x 2--3 yr recall    [place] EGD TRANSORAL BIOPSY SINGLE/MULTIPLE N/A 12/27/2016    Dr Abbey Ferris EGD TRANSORAL BIOPSY SINGLE/MULTIPLE N/A 10/20/2017    Dr Elen Larsen (-)   [address] [facility] VASCULAR SURGERY Left 2016    fistula by Dr Wilhemina Osler at [address]  10/02/2017    [facility]. Left upper fistulograms/venograms, balloon angioplasty cephalic vein arch 99D02 conquest.    VASCULAR SURGERY  08/2018    FISTULOGRAM    VASCULAR SURGERY  10/29/2018    [facility]. Left upper fistulograms/venograms    VASCULAR SURGERY  12/19/2018    [facility]. Arch aortogram,left upper arteriograms.  VASCULAR SURGERY  03/06/2019    [facility]. Removal of tunneled dialyis catheter right internal jugular vein.  VASCULAR SURGERY  08/28/2019    [facility]. Left upper extremity fistulogram including venography to the superior vena cava. Balloon angioplasty left subclavian vein with 10mm x 40mm conquest balloon. Balloon angioplasty mid/proximal upper arm cephalic vein with 18OL x 40mm conquest balloon. Venograms after balloon angioplasty. Stent left mid/proximal upper arm cephalic vein stenosis fluency 10mm x 60mm self-expanding covered stent. Balloon     VASCULAR SURGERY  08/28/2019    cont angioplasty stent with 10mm x 40mm conquest balloon. Completion venograms left upper extremity. Family History  Family History   Problem Relation Age of Onset    Colon Cancer Mother          at 63    Colon Polyps Mother     Lung Cancer Father          at 79    Colon Polyps Father     Stomach Cancer Sister     Breast Cancer Sister 27    Depression Daughter 25        committed suicide    Liver Cancer Neg Hx     Liver Disease Neg Hx     Esophageal Cancer Neg Hx     Rectal Cancer Neg Hx        Social History  Social History     Socioeconomic History    Marital status: Single     Spouse name: Not on file    Number of children: 2    Years of education: Not on file    Highest education level: Not on file   Occupational History    Not on file   Tobacco Use    Smoking status: Current Every Day Smoker     Packs/day: 0.50     Years: 33.00     Pack years: 16.50     Types: Cigarettes    Smokeless tobacco: Never Used    Tobacco comment: NOW at 1/4 PD, started at age 24 and has averaged 2 PPD   Vaping Use    Vaping Use: Never used   Substance and Sexual Activity    Alcohol use: No    Drug use: Not Currently     Types: Marijuana Veldon Breath)    Sexual activity: Not Currently     Partners: Male   Other Topics Concern    Not on file   Social History Narrative    Not on file     Social Determinants of Health     Financial Resource Strain:     Difficulty of Paying Living Expenses: Not on file   Food Insecurity:     Worried About Running Out of Food in the Last Year: Not on file    Kendy of Food in the Last Year: Not on file   Transportation Needs:     Lack of Transportation (Medical): Not on file    Lack of Transportation (Non-Medical):  Not on file   Physical Activity:     Days of Exercise per Week: Not on file    Minutes of Exercise per Session: Not on file   Stress:     Feeling of Stress : Not on file   Social Connections:     Frequency of Communication with Friends and Family: Not on file    Frequency of Social Gatherings with Friends and Family: Not on file    Attends Ep[employer] Services: Not on file    Active Member of Clubs or Organizations: Not on file    Attends Club or Organization Meetings: Not on file    Marital Status: Not on file   Intimate Partner Violence:     Fear of Current or Ex-Partner: Not on file    Emotionally Abused: Not on file    Physically Abused: Not on file    Sexually Abused: Not on file   Housing Stability:     Unable to Pay for Housing in the Last Year: Not on file    Number of Places Lived in the Last Year: Not on file    Unstable Housing in the Last Year: Not on file         Review of Systems:  History obtained from unobtainable from patient due to mental status          Objective:  Patient Vitals for the past 24 hrs:   BP Temp Temp src Pulse Resp SpO2 Height Weight   05/04/22 1002    84 14 100 %     05/04/22 0758       5' 6\" (1.676 m)    05/04/22 0745 (!) 185/64 97.1 °F (36.2 °C) Temporal 79 14 100 %     05/04/22 0730 (!) 173/64   78 14 100 %     05/04/22 0715 (!) 160/68   77 14 100 %     05/04/22 0700 (!) 176/68   77 14 100 %     05/04/22 0611    76 14 100 %     05/04/22 0600 (!) 177/67   76 14 100 %  128 lb 1.4 oz (58.1 kg)   05/04/22 0500 (!) 179/62   74 15 100 %     05/04/22 0407     14 100 %     05/04/22 0400 (!) 176/63 96.7 °F (35.9 °C) Temporal 73 14 100 %     05/04/22 0200 (!) 188/63   74 14 100 %     05/04/22 0157    76 14 100 %     05/04/22 0100 (!) 171/65   77 14 100 %     05/04/22 0000 (!) 177/66 97.9 °F (36.6 °C) Temporal 76 14 100 %     05/03/22 2300 (!) 172/68   84 14 100 %     05/03/22 2200 (!) 159/66   88 14 100 %     05/03/22 2100 (!) 172/65   90 14 100 %     05/03/22 2000  97.9 °F (36.6 °C) Temporal 90  100 %     05/03/22 1900 (!) 150/65   91 14 100 %     05/03/22 1845 (!) 157/69   93 14 100 %     05/03/22 1830 (!) 165/65   92 14 100 %     05/03/22 1815 (!) 167/63   91 14 100 %   05/03/22 1800 (!) 150/75   94 14 100 %     05/03/22 1745 (!) 145/70   95 14 100 %     05/03/22 1730 139/65   95 14 100 %     05/03/22 1715 (!) 148/71   96 14 99 %     05/03/22 1700 (!) 143/65   98 14 99 %     05/03/22 1645 123/77   100 14 99 %     05/03/22 1630 125/73 97.9 °F (36.6 °C) Temporal 104 14 98 %     05/03/22 1615 (!) 123/55   106 14 97 %     05/03/22 1600 (!) 101/55   110 14 98 %     05/03/22 1545 (!) 127/58   110 14 97 %     05/03/22 1530 129/66   113 14 95 %     05/03/22 1518    115 15 95 %     05/03/22 1515 130/63   115 14 94 %     05/03/22 1500 (!) 152/65   119 15 100 %     05/03/22 1445 (!) 244/102   142 (!) 44 94 %     05/03/22 1430 (!) 249/78   124 30 94 %     05/03/22 1415 (!) 242/92   130 28 100 %     05/03/22 1408     (!) 32 100 %     05/03/22 1400 (!) 214/83   118 (!) 32 98 %     05/03/22 1347       5' 6\" (1.676 m)    05/03/22 1345 (!) 225/84   112 28 99 %     05/03/22 1330 (!) 172/71   96 (!) 31 95 %     05/03/22 1315 (!) 150/64   92 16 (!) 81 %     05/03/22 1300 (!) 158/63   87 16 100 %     05/03/22 1245 (!) 165/65   89 22 100 %     05/03/22 1230 (!) 168/63   88 18 100 %     05/03/22 1215 (!) 165/61   87 16 99 %     05/03/22 1200 (!) 156/60 98.7 °F (37.1 °C) Temporal 87 19 100 %     05/03/22 1145 (!) 165/58   81 17 100 %     05/03/22 1130 (!) 152/60   83 16 100 %         Intake/Output Summary (Last 24 hours) at 5/4/2022 1128  Last data filed at 5/4/2022 0400  Gross per 24 hour   Intake 1060.69 ml   Output 420 ml   Net 640.69 ml     General: Ventilated and unable to participate in the history and physical examination  Chest:  clear to auscultation bilaterally  CVS: regular rate and rhythm  Abdominal: soft, nontender, normal bowel sounds  Extremities: no cyanosis or edema  Skin: warm and dry without rash    Labs:  BMP:   Recent Labs     05/02/22  0318 05/02/22  0318 05/03/22 0215 05/03/22  1831 05/04/22 0150 05/04/22  0404   *  --  136  --  133*  --    K 4.2   < > 3.8 4.0 4.4 4.1   CL 94*  --  92*  --  89*  --    CO2 23  --  28  --  26  --    PHOS 3.6  --  3.2  --  5.0*  --    BUN 32*  --  28*  --  51*  --    CREATININE 3.5*  --  2.8*  --  3.9*  --    CALCIUM 8.6  --  8.8  --  9.4  --     < > = values in this interval not displayed. CBC:   Recent Labs     05/02/22 0318 05/03/22 0215 05/04/22 0150   WBC 5.7 5.8 8.9   HGB 8.9* 8.8* 9.8*   HCT 30.2* 28.4* 32.2*   MCV 97.7 92.5 93.6    184 202     LIVER PROFILE:   Recent Labs     05/02/22 0318 05/03/22 0215 05/04/22 0150   AST 20 18 15   ALT 7 8 8   BILITOT <0.2 0.3 0.3   ALKPHOS 101 107* 114*     PT/INR:   Recent Labs     05/02/22 0318 05/03/22 0215 05/04/22 0150   PROTIME 14.4 13.6 14.3   INR 1.13 1.04 1.11     APTT: No results for input(s): APTT in the last 72 hours. BNP:  No results for input(s): BNP in the last 72 hours. Ionized Calcium:No results for input(s): IONCA in the last 72 hours. Magnesium:  Recent Labs     05/02/22 0318 05/03/22 0215 05/04/22 0150   MG 2.6 2.3 2.7*     Phosphorus:  Recent Labs     05/02/22 0318 05/03/22 0215 05/04/22 0150   PHOS 3.6 3.2 5.0*     HgbA1C:   No results for input(s): LABA1C in the last 72 hours. Hepatic:   Recent Labs     05/02/22  0318 05/03/22  0215 05/04/22  0150   ALKPHOS 101 107* 114*   ALT 7 8 8   AST 20 18 15   PROT 5.6* 6.0* 6.2*   BILITOT <0.2 0.3 0.3   LABALBU 2.8* 3.1* 3.1*     Lactic Acid: No results for input(s): LACTA in the last 72 hours. Troponin: No results for input(s): CKTOTAL, CKMB, TROPONINT in the last 72 hours. ABGs:   Lab Results   Component Value Date    PHART 7.540 05/04/2022    PO2ART 119.0 05/04/2022    LRN7SRT 36.0 05/04/2022     CRP:  No results for input(s): CRP in the last 72 hours. Sed Rate:  No results for input(s): SEDRATE in the last 72 hours.     Culture Results:   Blood Culture Recent:   Recent Labs Gary    XR CHEST PORTABLE    Result Date: 4/24/2022  XR CHEST PORTABLE 4/24/2022 1:17 PM HISTORY: ET tube placement  Technique: Single AP view of the chest COMPARISONS: 4/24/2022 FINDINGS: Status post endotracheal intubation. ET tube is in good position. Lungs are well-expanded. Enteric tube curls on itself in the midesophagus and then extends back up into the throat. Heart size is stable. Pulmonary vasculature are nondilated. 1. ET tube is in good position. Lungs are clear and well expanded. 2. Enteric tube curls on itself in the mid esophagus before extending back up into the throat. This will need repositioned. The results of this exam were discussed with Dr. Saint Clair on 4/24/2022 at 1329 hours Signed by Dr Makayla Meza    XR CHEST PORTABLE    Result Date: 4/24/2022  XR CHEST PORTABLE 4/24/2022 12:34 PM HISTORY: Altered mental status  Technique: Single AP view of the chest COMPARISONS: Chest exam dated 4/1/2022 FINDINGS: No lung consolidation. No pleural effusion or pneumothorax. Cardiomediastinal silhouette and pulmonary vasculature are unremarkable. No acute bony abnormality. Left subclavian region vascular stent with additional stent projecting over the left humerus. No acute cardiopulmonary process. Signed by Dr Makayla Meza    VL DUP CAROTID BILATERAL    Result Date: 4/29/2022  Vascular Carotid Procedure  Demographics   Patient Name    Sergio Perez Age                   47   Patient Number  [id number]       Gender                Female   Visit Number    [id number]    Darwin Kumar                               Physician   Date of Birth   1970   Referring Physician   [facility]       [id number]   [address], [place], [address]  Number  Procedure Type of Study:   Cerebral:Carotid, VL CAROTID BILATERAL. Indications for Study:Stroke.  Risk Factors   - The patient's risk factor(s) include: diabetes mellitus and arterial     hypertension.   - Current - Every day. Allergies   - Penicillins.   - Stadol.   - Other allergy:(Lamisil Advanced). Technical Quality:Limited visualization due to patient on ventilator. Impression   There is mixed plaque visualized in the bilateral internal carotid  artery(ies). There is less than 50% stenosis in the right internal carotid artery. There is less than 50% stenosis of the left internal carotid artery. There is normal antegrade flow in the bilateral vertebral artery(ies). right side is very limited due to positioning, her chin is resting on her  right clavicle and she is vented. Signature   ----------------------------------------------------------------  Electronically signed by Bernabe AmbrizInterpreting  physician) on 04/29/2022 03:20 PM  ----------------------------------------------------------------  Blood Pressure:Right arm 178/69 mmHg. Velocities are measured in cm/s ; Diameters are measured in mm Carotid Right Measurements +------------+-------+-------+--------+-------+------------+---------------+ ! Location    ! PSV    ! EDV    ! Angle   ! RI     !%Stenosis   ! Tortuosity     ! +------------+-------+-------+--------+-------+------------+---------------+ ! Prox CCA    !47.2   !       !60      !       !            !               ! +------------+-------+-------+--------+-------+------------+---------------+ ! Mid CCA     !47.2   !       !60      !       !            !               ! +------------+-------+-------+--------+-------+------------+---------------+ ! Prox ICA    !41     !7.46   !60      !0.82   !            !               ! +------------+-------+-------+--------+-------+------------+---------------+ ! Mid ICA     !62.7   !11.2   !60      !0.82   !            !               ! +------------+-------+-------+--------+-------+------------+---------------+ ! Dist ICA    !75.2   !13.7   !60      !0.82   !            !               ! +------------+-------+-------+--------+-------+------------+---------------+ ! Prox ECA    !147    !       !61      !       !            !               ! +------------+-------+-------+--------+-------+------------+---------------+ ! Vertebral   !47.2   !13.7   !60      !0.71   !            !               ! +------------+-------+-------+--------+-------+------------+---------------+   - There is antegrade vertebral flow noted on the right side. - Additional Measurements:ICAPSV/CCAPSV 1.59. Carotid Left Measurements +------------+-------+-------+--------+-------+------------+---------------+ ! Location    ! PSV    ! EDV    ! Angle   ! RI     !%Stenosis   ! Tortuosity     ! +------------+-------+-------+--------+-------+------------+---------------+ ! Prox CCA    !81.4   !9.94   !60      !0.88   !            !               ! +------------+-------+-------+--------+-------+------------+---------------+ ! Mid CCA     !70.2   !7.46   !60      !0.89   !            !               ! +------------+-------+-------+--------+-------+------------+---------------+ ! Prox ICA    !69.4   !11.9   !60      !0.83   !            !               ! +------------+-------+-------+--------+-------+------------+---------------+ ! Mid ICA     !80.6   !19.6   !60      !0.76   !            !               ! +------------+-------+-------+--------+-------+------------+---------------+ ! Dist ICA    !70.3   !12.5   !50      !0.82   !            !               ! +------------+-------+-------+--------+-------+------------+---------------+ ! Prox ECA    !113    !       !61      !       !            !               ! +------------+-------+-------+--------+-------+------------+---------------+ ! Vertebral   !38.8   !       !60      !       !            !               ! +------------+-------+-------+--------+-------+------------+---------------+   - There is antegrade vertebral flow noted on the left side.    - Additional Measurements:ICAPSV/CCAPSV 0.99.ICAEDV/CCAEDV 1.97. MRI BRAIN WO CONTRAST    Result Date: 4/29/2022  Examination. MRI BRAIN WO CONTRAST 4/29/2022 11:43 AM History: Seizure activity. The multiplanar, multisequence MR imaging of the brain is performed without contrast enhancement. There is no previous similar study for comparison. The correlation made with CT scan of the head dated 4/24/2022. The diffusion-weighted imaging sequence including the ADC map show an area of acute infarction involving the left posterior frontal, parietal and basal occipital lobe, the left middle cerebral artery territory. A small linear focus of T2 signal in the right posterior paraventricular white matter has no corresponding ADC map changes and may represent a subacute or chronic process. The targeted images of the mesial temporal lobe show moderate symmetrical mesial temporal atrophy without presence of any abnormal signal. This may be a process of general volume loss/atrophy. Moderately dilated ventricles, basal cisterns and the cortical sulci suggestive chronic volume loss/atrophy. The orbits are suboptimally evaluated. No obvious abnormality. Limited visualized optic nerve, optic chiasma and optic tracts are normal and symmetrical. The pituitary gland appears normal. The corpus callosum, the brainstem and cerebellum are normal. The FLAIR sequence images show significant periventricular T2 signal changes representing chronic white matter ischemia and transependymal CSF flow. The gradient recalled imaging sequence show normal flow void in the visualized intracranial vessels, particularly normal flow in the limited visualized left middle cerebral artery. There is mucosal thickening involving the ethmoid sinuses. The bilateral mastoid effusion. 1. Acute left middle cerebral territory infarct. 2. Chronic ischemic and atrophic changes. 3. The symmetrical mesial temporal atrophy is probably a part of the generalized volume loss. 4. Ethmoid sinusitis.  Bilateral mastoid effusion. The above report was immediately called to the nurse in charge of the patient in CCU. Signed by Dr Lima Todd    ECHO 2D Orvil Meals    Result Date: 4/30/2022  Transthoracic Echocardiography Report (TTE)  Demographics   Patient Name  [facility] Date of Study          04/29/2022   MRN           [id number]       Gender                 Female   Date of Birth 1970   Room Number            MHL-0702   Age           47 year(s)   Height:       66 inches    Referring Physician    Anni Ty   Weight:       144 pounds   Sonographer            Jessie Madera [facility]   BSA:          1.74 m^2     Interpreting Physician Melinda Bates MD   BMI:          23.24 kg/m^2  Procedure Type of Study   TTE procedure:ECHO NO CONTRAST WITH DOP/COLR. Study Location: Portable Technical Quality: Adequate visualization Patient Status: Inpatient Contrast Medium: Bubble Study. Rhythm: Within normal limits HR: 57 bpm BP: 178/69 mmHg Indications:Stroke. Conclusions   Summary  Mitral valve leaflets are mildly thickened with preserved leaflet  mobility. Mild aortic stenosis. Mean gradient 14 mm hg  Tricuspid valve is structurally normal.  Mild tricuspid regurgitation. Moderately dilated left atrium. Normal intact intra-atrial septum was noted with no evidence of  significant intra-atrial communications by color flow Doppler or by  agitated saline study. Normal left ventricular size with preserved LV function and an estimated  ejection fraction of approximately 55-60%. Moderate concentric left ventricular hypertrophy. No regional wall motion abnormalities.   Grade II diastolic dysfunction   Signature   ----------------------------------------------------------------  Electronically signed by Melinda Bates MD(Interpreting  physician) on 04/30/2022 11:02 AM  ----------------------------------------------------------------   Findings   Mitral Valve  Mitral valve leaflets are mildly thickened with preserved leaflet  mobility. Aortic Valve  Mild aortic stenosis. Mean gradient 14 mm hg   Tricuspid Valve  Tricuspid valve is structurally normal.  Mild tricuspid regurgitation. Pulmonic Valve  The pulmonic valve was not well visualized . Left Atrium  Moderately dilated left atrium. Normal intact intra-atrial septum was noted with no evidence of  significant intra-atrial communications by color flow Doppler or by  agitated saline study. Left Ventricle  Normal left ventricular size with preserved LV function and an estimated  ejection fraction of approximately 55-60%. Moderate concentric left ventricular hypertrophy. No regional wall motion abnormalities. Grade II diastolic dysfunction   Right Atrium  Normal right atrial dimension with no evidence of thrombus or mass noted. Right Ventricle  Normal right ventricular size with preserved RV function. Pericardial Effusion  No evidence of significant pericardial effusion is noted. Pleural Effusion  No evidence of pleural effusion. Allergies   - Penicillins.   - Stadol.   - Other allergy:(Lamisil Advanced). 2D Measurements and Calculations(cm)   LVIDd: 4.4 cm                       LVIDs: 3.14 cm  IVSd: 1.41 cm  LVPWd: 1.43 cm                      AO Root Dimension: 2.4 cm  Rt. Vent.  Dimension: 2.64 cm        LA Dimension: 3.8 cm  % Ejection Fraction: 55 %           LA Area: 24.3 cm^2  LA Volume: 86.1 ml                  LV Systolic dimension: 2.14EY  LA Volume Index: 49 ml/m^2          LV PW diastolic: 0.93SN  LV dimension: 4.4 cm                LVOT diameter: 2 cm                                      RV Diastolic dimension: 2.73HJ  LVEDV: 115 ml                       LVEDVI: 66 ml/m^2  LVESV:36.3 ml                       LVESVI: 21 ml/m^2  Cardic Output (CO): 5.53l/min  Doppler Measurements and Calculations   AV Peak Velocity:257 cm/s              MV Peak E-Wave: 82.3 cm/s  AV Mean Velocity:178 cm/s              MV Peak A-Wave: 76.7 cm/s  AV Peak Gradient: 26.42 mmHg           MV E/A Ratio: 1.07 %  AV Mean Gradient: 14 mmHg              MV Mean velocity: 64.8cm/s  AV Area (Continuity):1.85 cm^2         MV Peak Gradient: 2.71 mmHg  AV VTI:52.4 cm/s                       MV Mean gradient: 2 mmHg  TR Velocity:207 cm/s  TR Gradient:17.14 mmHg   MV E' septal velocity: 5.66cm/s  MV E' lateral velocity:5.66 cm/s         Assessment   ESRD  DM2 with nephropathy on long-term insulin, uncontrolled  HTN  Hyponatremia  Hypokalemia  Secondary Hyperparathyroidism  Anemia CKD  Seizure disorder  Hypothyroidism       Plan:  Dialysis per routine scheduling   Ultrafiltration as tolerated  Wean ventilator per primary  Monitor labs  Neuro evaluation reviewed    Emelia Dorsey MD  05/04/22  11:28 AM

## 2022-05-04 NOTE — PROGRESS NOTES
[facility] Neurology Progress Note      Patient:   Rosalia Feng  MR#:    [id number]   Room:    0702/702-01   YOB: 1970  Date of Progress Note: 5/4/2022  Time of Note                           9:40 AM  Consulting Physician:  Reinaldo Nicole DO  Attending Physician:  Leyla Ott DO      INTERVAL HISTORY:  Extubated yesterday, had to be re-intubated, still localizing to pain, less withdrawal to pain over the the right side - unchanged. REVIEW OF SYSTEMS:  Unable to obtain     PHYSICAL EXAM:    Constitutional -   BP (!) 177/67   Pulse 76   Temp 96.7 °F (35.9 °C) (Temporal)   Resp 14   Ht 5' 6\" (1.676 m)   Wt 128 lb 1.4 oz (58.1 kg)   SpO2 100%   BMI 20.67 kg/m²   General appearance: Intubated, sedated   EYES -   Conjunctiva normal  Pupillary exam as below, see CN exam in the neurologic exam  ENT-    No scars, masses, or lesions over external nose or ears  Oropharynx - intubated  Cardiovascular -   No clubbing, cyanosis  Pulmonary-   Mechanically ventilated   Musculoskeletal -   No significant wasting of muscles noted  Gait as below, see gait exam in the neurologic exam  Muscle strength, tone, stability as below see the motor exam in the neurologic exam.   No bony deformities  Skin -   Warm, dry, and intact to inspection and palpation. No rash, erythema, or pallor        NEUROLOGICAL EXAM     Mental status    []? Awake, alert, oriented   []? Affect attention and concentration appear appropriate  []? Recent and remote memory appears unremarkable  []? Speech normal without dysarthria or aphasia, comprehension and repetition intact. COMMENTS:  Unresponsive to voice, not following commands, does appear to localize to pain    Cranial Nerves []? No VF deficit to confrontation,  optic discs normal, no papilledema on fundoscopic exam.  []? PERRLA, EOMI, no nystagmus, conjugate eye movements, no ptosis  []? Face symmetric  []? Facial sensation intact  []?  Tongue midline no atrophy or fasciculations present  []? Palate midline, hearing to finger rub normal  []? Shoulder shrug and SCM testing normal  COMMENTS:  PERRL, Face appears sym, OCR present   Motor   []? 5/5 strength x 4 extremities  [x]? Normal bulk and tone  [x]? No tremor present  []? No rigidity or bradykinesia noted  COMMENTS: Withdrawal x 4, less over RUE   Sensory  []? Sensation intact to light touch, pin prick, vibration, and proprioception BLE  []? Sensation intact to light touch, pin prick, vibration, and proprioception BUE  COMMENTS: Limited    Coordination []? FTN normal bilaterally   []? HTS normal bilaterally  []? SANFORD normal.   COMMENTS: Limited    Reflexes  [x]? Symmetric and non-pathological  [x]? Toes downgoing bilaterally  [x]? No clonus present  COMMENTS:   Gait                  []? Normal steady gait    []? Ataxic    []? Spastic     []? Magnetic     []? Shuffling  [x]? Not assessed  COMMENTS:        LABS/IMAGING:    CT Head WO Contrast    Result Date: 4/24/2022  CT HEAD WO CONTRAST 4/24/2022 2:01 PM HISTORY: Altered mental status COMPARISON: CT scan dated 11/18/2021 DOSE LENGTH PRODUCT: 766 mGy cm TECHNIQUE: Helical tomographic images of the brain were obtained without the use of intravenous contrast. Automated exposure control was also utilized to decrease patient radiation dose. FINDINGS: There is no evidence of evolving large vascular territory infarct. No visualized intra-axial or extra-axial hemorrhage. No mass lesion is identified. Normal size and configuration of the ventricular system. The basal cisterns are symmetric. Posterior fossa structures are unremarkable. The included orbits and their contents are unremarkable. Chronic paranasal sinus disease on the left. The visualized osseous structures and overlying soft tissues of the skull and face are unremarkable. 1. No acute intracranial process.  Signed by Dr Gabriela Bosch    XR CHEST PORTABLE    Result Date: 4/24/2022  XR CHEST PORTABLE 4/24/2022 4:27 PM HISTORY: ET tube, enteric tube  Technique: Single AP view of the chest COMPARISONS: 4/24/2022 FINDINGS: Endotracheal tube is identified with tip essentially touching the krysta. Enteric tube courses into the stomach with tip curled in the fundus back towards the gastroesophageal junction. The lungs are clear and well expanded. Heart size is stable. Pulmonary vasculature are nondilated. No pleural effusion or pneumothorax. 1. Endotracheal tube is deep, tip essentially touching the krysta. Lungs are clear and well expanded. I would recommend 2-3 cm withdrawal for a more optimal positioning. The results of this exam were discussed with Jude Waite (ICU nursing) on 4/24/2022 at 1640 hours Signed by Dr Brandyn Avendano    XR CHEST PORTABLE    Result Date: 4/24/2022  XR CHEST PORTABLE 4/24/2022 1:17 PM HISTORY: ET tube placement  Technique: Single AP view of the chest COMPARISONS: 4/24/2022 FINDINGS: Status post endotracheal intubation. ET tube is in good position. Lungs are well-expanded. Enteric tube curls on itself in the midesophagus and then extends back up into the throat. Heart size is stable. Pulmonary vasculature are nondilated. 1. ET tube is in good position. Lungs are clear and well expanded. 2. Enteric tube curls on itself in the mid esophagus before extending back up into the throat. This will need repositioned. The results of this exam were discussed with Dr. Klaus Perez on 4/24/2022 at 1329 hours Signed by Dr Brandyn Avendano    XR CHEST PORTABLE    Result Date: 4/24/2022  XR CHEST PORTABLE 4/24/2022 12:34 PM HISTORY: Altered mental status  Technique: Single AP view of the chest COMPARISONS: Chest exam dated 4/1/2022 FINDINGS: No lung consolidation. No pleural effusion or pneumothorax. Cardiomediastinal silhouette and pulmonary vasculature are unremarkable. No acute bony abnormality. Left subclavian region vascular stent with additional stent projecting over the left humerus. No acute cardiopulmonary process. Signed by Dr Obed Bear      Lab Results   Component Value Date    WBC 8.9 05/04/2022    HGB 9.8 (L) 05/04/2022    HCT 32.2 (L) 05/04/2022    MCV 93.6 05/04/2022     05/04/2022     Lab Results   Component Value Date     (L) 05/04/2022    K 4.1 05/04/2022    CL 89 (L) 05/04/2022    CO2 26 05/04/2022    BUN 51 (H) 05/04/2022    CREATININE 3.9 (H) 05/04/2022    GLUCOSE 360 (H) 05/04/2022    CALCIUM 9.4 05/04/2022    PROT 6.2 (L) 05/04/2022    LABALBU 3.1 (L) 05/04/2022    BILITOT 0.3 05/04/2022    ALKPHOS 114 (H) 05/04/2022    AST 15 05/04/2022    ALT 8 05/04/2022    LABGLOM 12 (A) 05/04/2022    GFRAA 15 (L) 05/04/2022     Lab Results   Component Value Date    INR 1.11 05/04/2022    INR 1.04 05/03/2022    INR 1.13 05/02/2022    PROTIME 14.3 05/04/2022    PROTIME 13.6 05/03/2022    PROTIME 14.4 05/02/2022       RECORD REVIEW:   Previous medical records, medications were reviewed at today's visit. Nursing/physician notes, imaging, labs and vitals reviewed. ASSESSMENT:  47 y.o. admitted after an episode of unresponsiveness followed by generalized tonic-clonic event in the emergency department. She had profoundly elevated glucose, possible hyperglycemic coma, hyponatremia, underlying UTI,  end-stage renal disease with a history of noncompliance noted as well. MRI with large scattered left MCA distribution stroke. ECHO negative from a cardioembolic standpoint. Failed extubation yesterday, back on mechanical ventilation. Exam largely unchanged.      PLAN:  1. Stroke is large, risk for hemorraghic transformation is felt moderate to high. Now off coumadin, on ASA, ECHO negative for cardioembolic source, no a fib on tele. Follow up hypercoagulable labs. 2.  Continue Keppra  3. Continue addressing respiratory failure, blood glucose, hyponatremia, UTI, c diff, ESRD nephrology following  4. Limit sedation as able  5. Palliative care following, overall medical prognosis is felt poor.        Please feel free to call with any questions. [phone] (cell phone).       Nakita Hernandez DO  Board Certified Neurology

## 2022-05-04 NOTE — PROGRESS NOTES
Hospitalist Progress Note    Patient:  Claudia Nassar  YOB: 1970  Date of Service: 5/4/2022  MRN: [id number]   Acct: [de-identified]   Primary Care Physician: ROMINA Cash  Advance Directive: Full Code  Admit Date: 4/24/2022       Hospital Day: 10  Referring Provider: Adria Ponce DO    Patient Seen, Chart, Consults, Notes, Labs, Radiology studies reviewed. Subjective:  Claudia Nassar is a 47 y.o. female  whom we are following for acute left MCA infarct, seizures, hypoxemic respiratory failure, end-stage renal disease. She was seen on dialysis this morning. Ultrafiltration goal is just over 2 L. She has failed prior spontaneous breathing trials. She required reintubation and it was a difficult airway because of cord edema. If the family wishes to continue with aggressive care, she will need a trach and PEG. Palliative care is working with patient's .     Allergies:  Penicillins, Lamisil advanced [terbinafine], Reglan [metoclopramide], and Stadol [butorphanol]    Medicines:  Current Facility-Administered Medications   Medication Dose Route Frequency Provider Last Rate Last Admin    insulin glargine (LANTUS) injection vial 20 Units  20 Units SubCUTAneous BID Adria Ponce DO   20 Units at 05/04/22 8622    hydrALAZINE (APRESOLINE) injection 10 mg  10 mg IntraVENous Q6H PRN Dom Lee MD   10 mg at 05/03/22 1434    aspirin chewable tablet 81 mg  81 mg Oral Daily Dom Lee MD   81 mg at 05/04/22 0757    racepinephrine HCl (VAPONEFPRIN) 2.25 % nebulizer solution NEBU 11.25 mg  11.25 mg Nebulization Q4H PRN Dom Lee MD   11.25 mg at 05/03/22 1408    sodium chloride nebulizer 0.9 % solution 3 mL  3 mL Nebulization Q4H PRN Dom Lee MD        methylPREDNISolone sodium (SOLU-MEDROL) injection 40 mg  40 mg IntraVENous Q8H Dom Lee MD   40 mg at 05/04/22 1550    propofol injection  5-50 mcg/kg/min IntraVENous Continuous Dom Lee MD 14.4 mL/hr at 05/04/22 1725 40 mcg/kg/min at 05/04/22 1725    heparin (porcine) injection 5,000 Units  5,000 Units SubCUTAneous TID Kalen Cuevas MD   5,000 Units at 05/04/22 1549    Peppermint Spirit SPRT   Does not apply PRN Alli Ganong, DO        niCARdipine (CARDENE) 25 mg in dextrose 5 % 250 mL infusion  2.5-15 mg/hr IntraVENous Continuous Shane Lopez MD   Stopped at 05/02/22 1622    dexmedetomidine (PRECEDEX) 400 mcg in sodium chloride 0.9 % 100 mL infusion  0.1-1.5 mcg/kg/hr IntraVENous Continuous Alli Ganong, DO   Stopped at 05/03/22 1143    menthol-zinc oxide (CALMOSEPTINE) 0.44-20.6 % ointment   Topical BID Kalen Cuevas MD   Given at 05/04/22 0531    sodium phosphate 10.5 mmol in sodium chloride 0.9 % 250 mL IVPB  0.16 mmol/kg IntraVENous PRN Patience Walter, DO        Or    sodium phosphate 21 mmol in sodium chloride 0.9 % 250 mL IVPB  0.32 mmol/kg IntraVENous PRN Patience Walter, DO        insulin lispro (HUMALOG) injection vial 0-12 Units  0-12 Units SubCUTAneous TID WC Alli Ganong, DO   6 Units at 05/04/22 1214    insulin lispro (HUMALOG) injection vial 0-6 Units  0-6 Units SubCUTAneous Nightly Alli Ganong, DO   4 Units at 05/03/22 2026    vancomycin (VANCOCIN) oral solution 125 mg  125 mg Oral 4 times per day Alli Ganong, DO   125 mg at 05/04/22 1214    hydrALAZINE (APRESOLINE) tablet 100 mg  100 mg Oral 3 times per day Patience Walter, DO   100 mg at 05/04/22 0531    glucagon (rDNA) injection 1 mg  1 mg IntraMUSCular PRN Alli Ganong, DO        dextrose 5 % solution  100 mL/hr IntraVENous PRN Alli Ganong, DO        glucose chewable tablet 16 g  4 tablet Oral PRN Alli Ganong, DO        dextrose bolus (hypoglycemia) 10% 125 mL  125 mL IntraVENous PRN Alli Ganong, DO   Stopped at 04/25/22 0828    Or    dextrose bolus (hypoglycemia) 10% 250 mL  250 mL IntraVENous PRN Alli Ganong, DO        sodium chloride flush 0.9 % injection 5-40 mL  5-40 mL IntraVENous 2 times per day Blima Brightly, DO   10 mL at 05/04/22 0809    sodium chloride flush 0.9 % injection 5-40 mL  5-40 mL IntraVENous PRN Blima Brightly, DO        0.9 % sodium chloride infusion   IntraVENous PRN Blima Brightly, DO   Stopped at 05/03/22 2005    LORazepam (ATIVAN) injection 1 mg  1 mg IntraVENous Q5 Min PRN Blima Brightly, DO        ondansetron (ZOFRAN-ODT) disintegrating tablet 4 mg  4 mg Oral Q8H PRN Blima Brightly, DO        Or    ondansetron [facility]) injection 4 mg  4 mg IntraVENous Q6H PRN Blima Brightly, DO        polyethylene glycol (GLYCOLAX) packet 17 g  17 g Oral Daily PRN Blima Brightly, DO        acetaminophen (TYLENOL) tablet 650 mg  650 mg Oral Q6H PRN Blima Brightly, DO   650 mg at 04/25/22 3684    Or    acetaminophen (TYLENOL) suppository 650 mg  650 mg Rectal Q6H PRN Blima Brightly, DO        levETIRAcetam (KEPPRA) 500 mg/100 mL IVPB  500 mg IntraVENous Q12H Blima Brightly, DO   Stopped at 05/04/22 1329    chlorhexidine (PERIDEX) 0.12 % solution 15 mL  15 mL Mouth/Throat BID Blima Brightly, DO   15 mL at 05/04/22 0800    famotidine (PEPCID) 20 mg in sodium chloride (PF) 10 mL injection  20 mg IntraVENous Daily Blima Brightly, DO   20 mg at 05/04/22 8937    magic butt cream   Topical Q4H PRN Blima Brightly, DO   Given at 05/03/22 0940    magnesium sulfate 1000 mg in dextrose 5% 100 mL IVPB  1,000 mg IntraVENous PRN Patience Walter, DO        potassium bicarb-citric acid (EFFER-K) effervescent tablet 40 mEq  40 mEq Oral PRN Patience Walter, DO        Or    potassium chloride 10 mEq/100 mL IVPB (Peripheral Line)  10 mEq IntraVENous PRN Stella Scarce, DO   Stopped at 04/25/22 6115       Past Medical History:  Past Medical History:   Diagnosis Date    Acute ischemic stroke ([facility] [address] 4/30/2022    Arthritis     Chronic kidney disease, stage 5, kidney failure (HCC)     Closed fracture of right shoulder girdle, with routine healing, subsequent encounter     DM (diabetes mellitus) type II controlled with renal manifestation ([facility] [address] 06/1993    Gastroparesis     GERD (gastroesophageal reflux disease)     Hemodialysis patient ([facility] [address]     dialysis on tues, thur, and sat at [facility]    Hypertension     Hypothyroid     Nasal congestion     recent    Neuropathy     Noncompliance with medications     Palliative care patient 10/08/2019    Restless leg syndrome        Past Surgical History:  Past Surgical History:   Procedure Laterality Date    BREAST SURGERY Left 2008    infected milk gland removed    BREAST SURGERY Right 5/25/2021    WEDGE EXCISION RIGHT BREAST DUCT WITH LACRIMAL DUCT PROBE, PEC BLOCK performed by Valencia Acosta MD at [address], LAPAROSCOPIC      2008    COLONOSCOPY Left 9/17/2020    Dr [facility] hepatic flexure-Severe tortuosity with severe spasming, 1 yr recall    DIALYSIS FISTULA CREATION Left 1/25/2019    REVISION LEFT UPPER EXTREMITY AV ACCESS WITH LEFT BRACHIAL ARTERY TO LEFT AXILLARY VEIN WITH  INTERPOSITIONAL ARTEGRAFT   performed by Rocky Ramos MD at [address]  2012    [address] Right 1/25/2019    INSERTION OF RIGHT INTERNAL JUGULAR HEMODIALYSIS CATHETER performed by Rocky Ramos MD at [address] [facility]  08/17/2018    1.  Moderately increased stainable iron identified by special stain in Kupffer cells and occasional hepatocytes. Negative for evidence of significant portal or lobular inflammation.  Negative for evidence of significant fibrosis    TN COLONOSCOPY W/BIOPSY SINGLE/MULTIPLE N/A 10/20/2017    Dr Jyotsna Hidalgo prep-Tubular AP (-) dysplasia x 2--3 yr recall    TN EGD TRANSORAL BIOPSY SINGLE/MULTIPLE N/A 12/27/2016    Dr Erma Mclaughlin EGD TRANSORAL BIOPSY SINGLE/MULTIPLE N/A 10/20/2017    Dr Arleen Montiel (-)   [facility] VASCULAR SURGERY Left 2016 fistula by Dr Suraj Thompson at [address]  10/02/2017    [facility]. Left upper fistulograms/venograms, balloon angioplasty cephalic vein arch 14Q39 conquest.    VASCULAR SURGERY  2018    FISTULOGRAM    VASCULAR SURGERY  10/29/2018    [facility]. Left upper fistulograms/venograms    VASCULAR SURGERY  2018    [facility]. Arch aortogram,left upper arteriograms.  VASCULAR SURGERY  2019    [facility]. Removal of tunneled dialyis catheter right internal jugular vein.  VASCULAR SURGERY  2019    [facility]. Left upper extremity fistulogram including venography to the superior vena cava. Balloon angioplasty left subclavian vein with 10mm x 40mm conquest balloon. Balloon angioplasty mid/proximal upper arm cephalic vein with 14CJ x 40mm conquest balloon. Venograms after balloon angioplasty. Stent left mid/proximal upper arm cephalic vein stenosis fluency 10mm x 60mm self-expanding covered stent. Balloon     VASCULAR SURGERY  2019    cont angioplasty stent with 10mm x 40mm conquest balloon. Completion venograms left upper extremity.        Family History  Family History   Problem Relation Age of Onset    Colon Cancer Mother          at 63    Colon Polyps Mother     Lung Cancer Father          at 79    Colon Polyps Father     Stomach Cancer Sister     Breast Cancer Sister 27    Depression Daughter 25        committed suicide    Liver Cancer Neg Hx     Liver Disease Neg Hx     Esophageal Cancer Neg Hx     Rectal Cancer Neg Hx        Social History  Social History     Socioeconomic History    Marital status: Single     Spouse name: Not on file    Number of children: 2    Years of education: Not on file    Highest education level: Not on file   Occupational History    Not on file   Tobacco Use    Smoking status: Current Every Day Smoker     Packs/day: 0.50     Years: 33.00     Pack years: 16.50     Types: Cigarettes    Smokeless tobacco: Never Used    Tobacco comment: NOW at 1/4 PD, started at age 24 and has averaged 2 PPD   Vaping Use    Vaping Use: Never used   Substance and Sexual Activity    Alcohol use: No    Drug use: Not Currently     Types: Marijuana Rhea Kales)    Sexual activity: Not Currently     Partners: Male   Other Topics Concern    Not on file   Social History Narrative    Not on file     Social Determinants of Health     Financial Resource Strain:     Difficulty of Paying Living Expenses: Not on file   Food Insecurity:     Worried About Running Out of Food in the Last Year: Not on file    Kendy of Food in the Last Year: Not on file   Transportation Needs:     Lack of Transportation (Medical): Not on file    Lack of Transportation (Non-Medical): Not on file   Physical Activity:     Days of Exercise per Week: Not on file    Minutes of Exercise per Session: Not on file   Stress:     Feeling of Stress : Not on file   Social Connections:     Frequency of Communication with Friends and Family: Not on file    Frequency of Social Gatherings with Friends and Family: Not on file    Attends Sabianism Services: Not on file    Active Member of [address] [place] [address] or Organizations: Not on file    Attends Club or Organization Meetings: Not on file    Marital Status: Not on file   Intimate Partner Violence:     Fear of Current or Ex-Partner: Not on file    Emotionally Abused: Not on file    Physically Abused: Not on file    Sexually Abused: Not on file   Housing Stability:     Unable to Pay for Housing in the Last Year: Not on file    Number of [place] in the Last Year: Not on file    Unstable Housing in the Last Year: Not on file         Review of Systems:    Review of Systems   Unable to perform ROS: Intubated           Objective:  Blood pressure (!) 141/61, pulse 79, temperature 97.1 °F (36.2 °C), temperature source Temporal, resp. rate 14, height 5' 6\" (1.676 m), weight 128 lb 1.4 oz (58.1 kg), SpO2 99 %.     Intake/Output Summary (Last 24 hours) at 5/4/2022 6391  Last data filed at 5/4/2022 1400  Gross per 24 hour   Intake 1517.27 ml   Output 657 ml   Net 860.27 ml       Physical Exam  Vitals and nursing note reviewed. Constitutional:       Appearance: She is ill-appearing. Interventions: She is sedated and intubated. HENT:      Head: Normocephalic and atraumatic. Right Ear: External ear normal.      Left Ear: External ear normal.      Nose: Nose normal.      Mouth/Throat:      Mouth: Mucous membranes are moist.   Eyes:      Conjunctiva/sclera: Conjunctivae normal.      Pupils: Pupils are equal, round, and reactive to light. Cardiovascular:      Rate and Rhythm: Normal rate and regular rhythm. Heart sounds: Normal heart sounds. Pulmonary:      Effort: Pulmonary effort is normal. She is intubated. Breath sounds: Normal breath sounds. Abdominal:      General: Abdomen is flat. Palpations: Abdomen is soft. Musculoskeletal:      Cervical back: Neck supple. No rigidity. No muscular tenderness. Skin:     General: Skin is warm and dry. Neurological:      Mental Status: She is alert. Labs:  BMP:   Recent Labs     05/02/22 0318 05/02/22 0318 05/03/22 0215 05/03/22  1831 05/04/22  0150 05/04/22  0404   *  --  136  --  133*  --    K 4.2   < > 3.8 4.0 4.4 4.1   CL 94*  --  92*  --  89*  --    CO2 23  --  28  --  26  --    PHOS 3.6  --  3.2  --  5.0*  --    BUN 32*  --  28*  --  51*  --    CREATININE 3.5*  --  2.8*  --  3.9*  --    CALCIUM 8.6  --  8.8  --  9.4  --     < > = values in this interval not displayed.      CBC:   Recent Labs     05/02/22 0318 05/03/22 0215 05/04/22  0150   WBC 5.7 5.8 8.9   HGB 8.9* 8.8* 9.8*   HCT 30.2* 28.4* 32.2*   MCV 97.7 92.5 93.6    184 202     LIVER PROFILE:   Recent Labs     05/02/22 0318 05/03/22 0215 05/04/22  0150   AST 20 18 15   ALT 7 8 8   BILITOT <0.2 0.3 0.3   ALKPHOS 101 107* 114*     PT/INR:   Recent Labs     05/02/22  0318 05/03/22  0215 05/04/22  0150   PROTIME 14.4 13.6 14.3   INR 1.13 1.04 1.11 APTT: No results for input(s): APTT in the last 72 hours. BNP:  No results for input(s): BNP in the last 72 hours. Ionized Calcium:No results for input(s): IONCA in the last 72 hours. Magnesium:  Recent Labs     05/02/22 0318 05/03/22 0215 05/04/22  0150   MG 2.6 2.3 2.7*     Phosphorus:  Recent Labs     05/02/22 0318 05/03/22 0215 05/04/22  0150   PHOS 3.6 3.2 5.0*     HgbA1C: No results for input(s): LABA1C in the last 72 hours. Hepatic:   Recent Labs     05/02/22 0318 05/03/22 0215 05/04/22  0150   ALKPHOS 101 107* 114*   ALT 7 8 8   AST 20 18 15   PROT 5.6* 6.0* 6.2*   BILITOT <0.2 0.3 0.3   LABALBU 2.8* 3.1* 3.1*     Lactic Acid: No results for input(s): LACTA in the last 72 hours. Troponin: No results for input(s): CKTOTAL, CKMB, TROPONINT in the last 72 hours. ABGs: No results for input(s): PH, PCO2, PO2, HCO3, O2SAT in the last 72 hours. CRP:  No results for input(s): CRP in the last 72 hours. Sed Rate:  No results for input(s): SEDRATE in the last 72 hours. Cultures:   No results for input(s): CULTURE in the last 72 hours. No results for input(s): BCPhuong in the last 72 hours. No results for input(s): CXSURG in the last 72 hours. Radiology reports as per the Radiologist  Radiology: CT Head WO Contrast    Result Date: 4/24/2022  CT HEAD WO CONTRAST 4/24/2022 2:01 PM HISTORY: Altered mental status COMPARISON: CT scan dated 11/18/2021 DOSE LENGTH PRODUCT: 766 mGy cm TECHNIQUE: Helical tomographic images of the brain were obtained without the use of intravenous contrast. Automated exposure control was also utilized to decrease patient radiation dose. FINDINGS: There is no evidence of evolving large vascular territory infarct. No visualized intra-axial or extra-axial hemorrhage. No mass lesion is identified. Normal size and configuration of the ventricular system. The basal cisterns are symmetric. Posterior fossa structures are unremarkable.  The included orbits and their contents are unremarkable. Chronic paranasal sinus disease on the left. The visualized osseous structures and overlying soft tissues of the skull and face are unremarkable. 1. No acute intracranial process. Signed by Dr Kelli Rivera    Result Date: 4/24/2022  XR CHEST PORTABLE 4/24/2022 4:27 PM HISTORY: ET tube, enteric tube  Technique: Single AP view of the chest COMPARISONS: 4/24/2022 FINDINGS: Endotracheal tube is identified with tip essentially touching the krysta. Enteric tube courses into the stomach with tip curled in the fundus back towards the gastroesophageal junction. The lungs are clear and well expanded. Heart size is stable. Pulmonary vasculature are nondilated. No pleural effusion or pneumothorax. 1. Endotracheal tube is deep, tip essentially touching the krysta. Lungs are clear and well expanded. I would recommend 2-3 cm withdrawal for a more optimal positioning. The results of this exam were discussed with Antoni Dickens (ICU nursing) on 4/24/2022 at 1640 hours Signed by Dr Michael Mayes    XR CHEST PORTABLE    Result Date: 4/24/2022  XR CHEST PORTABLE 4/24/2022 1:17 PM HISTORY: ET tube placement  Technique: Single AP view of the chest COMPARISONS: 4/24/2022 FINDINGS: Status post endotracheal intubation. ET tube is in good position. Lungs are well-expanded. Enteric tube curls on itself in the midesophagus and then extends back up into the throat. Heart size is stable. Pulmonary vasculature are nondilated. 1. ET tube is in good position. Lungs are clear and well expanded. 2. Enteric tube curls on itself in the mid esophagus before extending back up into the throat. This will need repositioned.  The results of this exam were discussed with Dr. [place]air on 4/24/2022 at 1329 hours Signed by Dr Michael Mayes    XR CHEST PORTABLE    Result Date: 4/24/2022  XR CHEST PORTABLE 4/24/2022 12:34 PM HISTORY: Altered mental status  Technique: Single AP view of the chest COMPARISONS: Chest exam dated 4/1/2022 FINDINGS: No lung consolidation. No pleural effusion or pneumothorax. Cardiomediastinal silhouette and pulmonary vasculature are unremarkable. No acute bony abnormality. Left subclavian region vascular stent with additional stent projecting over the left humerus. No acute cardiopulmonary process. Signed by Dr Dwayne Stanford. Continue Keppra. Neurology following. Acute left ischemic MCA stroke. Continue supportive care.     Acute hypoxemic respiratory failure. Continue ventilatory support. Will need trach if family wants ongoing aggressive care.     Nonketotic hyperosmolar hyperglycemia. Resolved  Transitioned to subcutaneous insulin     End-stage renal disease.   Dialysis today for additional ultrafiltration.     Please document 43 minutes of critical care time for patient assessment, chart review, discussion with staff, .  [address],

## 2022-05-05 LAB
ALBUMIN SERPL-MCNC: 2.9 G/DL (ref 3.5–5.2)
ALP BLD-CCNC: 100 U/L (ref 35–104)
ALT SERPL-CCNC: 6 U/L (ref 5–33)
ANION GAP SERPL CALCULATED.3IONS-SCNC: 17 MMOL/L (ref 7–19)
ANTI DNA DOUBLE STRANDED: 1.3 IU/ML
AST SERPL-CCNC: 13 U/L (ref 5–32)
BASOPHILS ABSOLUTE: 0 K/UL (ref 0–0.2)
BASOPHILS RELATIVE PERCENT: 0.1 % (ref 0–1)
BILIRUB SERPL-MCNC: <0.2 MG/DL (ref 0.2–1.2)
BUN BLDV-MCNC: 43 MG/DL (ref 6–20)
CALCIUM SERPL-MCNC: 9.1 MG/DL (ref 8.6–10)
CHLORIDE BLD-SCNC: 91 MMOL/L (ref 98–111)
CO2: 28 MMOL/L (ref 22–29)
CREAT SERPL-MCNC: 2.9 MG/DL (ref 0.5–0.9)
EOSINOPHILS ABSOLUTE: 0 K/UL (ref 0–0.6)
EOSINOPHILS RELATIVE PERCENT: 0 % (ref 0–5)
GFR AFRICAN AMERICAN: 21
GFR NON-AFRICAN AMERICAN: 17
GLUCOSE BLD-MCNC: 119 MG/DL (ref 70–99)
GLUCOSE BLD-MCNC: 163 MG/DL (ref 70–99)
GLUCOSE BLD-MCNC: 200 MG/DL (ref 70–99)
GLUCOSE BLD-MCNC: 252 MG/DL (ref 74–109)
GLUCOSE BLD-MCNC: 281 MG/DL (ref 70–99)
GLUCOSE BLD-MCNC: 313 MG/DL (ref 70–99)
HCT VFR BLD CALC: 31.7 % (ref 37–47)
HEMOGLOBIN: 9.9 G/DL (ref 12–16)
IMMATURE GRANULOCYTES #: 0 K/UL
INR BLD: 1.08 (ref 0.88–1.18)
LYMPHOCYTES ABSOLUTE: 0.7 K/UL (ref 1.1–4.5)
LYMPHOCYTES RELATIVE PERCENT: 9.1 % (ref 20–40)
MAGNESIUM: 2.4 MG/DL (ref 1.6–2.6)
MCH RBC QN AUTO: 28.7 PG (ref 27–31)
MCHC RBC AUTO-ENTMCNC: 31.2 G/DL (ref 33–37)
MCV RBC AUTO: 91.9 FL (ref 81–99)
MONOCYTES ABSOLUTE: 0.3 K/UL (ref 0–0.9)
MONOCYTES RELATIVE PERCENT: 4.1 % (ref 0–10)
NEUTROPHILS ABSOLUTE: 6.5 K/UL (ref 1.5–7.5)
NEUTROPHILS RELATIVE PERCENT: 86.4 % (ref 50–65)
PDW BLD-RTO: 18.3 % (ref 11.5–14.5)
PERFORMED ON: ABNORMAL
PHOSPHORUS: 4.9 MG/DL (ref 2.5–4.5)
PLATELET # BLD: 266 K/UL (ref 130–400)
PMV BLD AUTO: 10.2 FL (ref 9.4–12.3)
POTASSIUM SERPL-SCNC: 4.2 MMOL/L (ref 3.5–5)
PROTHROMBIN TIME: 13.9 SEC (ref 12–14.6)
RBC # BLD: 3.45 M/UL (ref 4.2–5.4)
SODIUM BLD-SCNC: 136 MMOL/L (ref 136–145)
TOTAL PROTEIN: 5.9 G/DL (ref 6.6–8.7)
WBC # BLD: 7.5 K/UL (ref 4.8–10.8)

## 2022-05-05 PROCEDURE — 99233 SBSQ HOSP IP/OBS HIGH 50: CPT | Performed by: PSYCHIATRY & NEUROLOGY

## 2022-05-05 PROCEDURE — 6360000002 HC RX W HCPCS: Performed by: INTERNAL MEDICINE

## 2022-05-05 PROCEDURE — 84100 ASSAY OF PHOSPHORUS: CPT

## 2022-05-05 PROCEDURE — 2500000003 HC RX 250 WO HCPCS: Performed by: INTERNAL MEDICINE

## 2022-05-05 PROCEDURE — 6370000000 HC RX 637 (ALT 250 FOR IP): Performed by: INTERNAL MEDICINE

## 2022-05-05 PROCEDURE — 36415 COLL VENOUS BLD VENIPUNCTURE: CPT

## 2022-05-05 PROCEDURE — 94003 VENT MGMT INPAT SUBQ DAY: CPT

## 2022-05-05 PROCEDURE — 2700000000 HC OXYGEN THERAPY PER DAY

## 2022-05-05 PROCEDURE — 2580000003 HC RX 258: Performed by: INTERNAL MEDICINE

## 2022-05-05 PROCEDURE — 82947 ASSAY GLUCOSE BLOOD QUANT: CPT

## 2022-05-05 PROCEDURE — 2100000000 HC CCU R&B

## 2022-05-05 PROCEDURE — 83735 ASSAY OF MAGNESIUM: CPT

## 2022-05-05 PROCEDURE — 85025 COMPLETE CBC W/AUTO DIFF WBC: CPT

## 2022-05-05 PROCEDURE — 85610 PROTHROMBIN TIME: CPT

## 2022-05-05 PROCEDURE — 80053 COMPREHEN METABOLIC PANEL: CPT

## 2022-05-05 PROCEDURE — 99232 SBSQ HOSP IP/OBS MODERATE 35: CPT

## 2022-05-05 RX ADMIN — INSULIN LISPRO 1 UNITS: 100 INJECTION, SOLUTION INTRAVENOUS; SUBCUTANEOUS at 21:53

## 2022-05-05 RX ADMIN — Medication 10 ML: at 21:54

## 2022-05-05 RX ADMIN — VANCOMYCIN HYDROCHLORIDE 125 MG: 1 INJECTION, POWDER, LYOPHILIZED, FOR SOLUTION INTRAVENOUS at 06:22

## 2022-05-05 RX ADMIN — CHLORHEXIDINE GLUCONATE 15 ML: 1.2 RINSE ORAL at 20:00

## 2022-05-05 RX ADMIN — METHYLPREDNISOLONE SODIUM SUCCINATE 40 MG: 40 INJECTION, POWDER, FOR SOLUTION INTRAMUSCULAR; INTRAVENOUS at 21:53

## 2022-05-05 RX ADMIN — VANCOMYCIN HYDROCHLORIDE 125 MG: 1 INJECTION, POWDER, LYOPHILIZED, FOR SOLUTION INTRAVENOUS at 17:39

## 2022-05-05 RX ADMIN — HYDRALAZINE HYDROCHLORIDE 100 MG: 50 TABLET, FILM COATED ORAL at 09:03

## 2022-05-05 RX ADMIN — LEVETIRACETAM 500 MG: 5 INJECTION INTRAVENOUS at 00:57

## 2022-05-05 RX ADMIN — Medication 10 ML: at 09:04

## 2022-05-05 RX ADMIN — PROPOFOL 25 MCG/KG/MIN: 10 INJECTION, EMULSION INTRAVENOUS at 14:29

## 2022-05-05 RX ADMIN — ASPIRIN 81 MG 81 MG: 81 TABLET ORAL at 08:56

## 2022-05-05 RX ADMIN — ANORECTAL OINTMENT: 15.7; .44; 24; 20.6 OINTMENT TOPICAL at 17:36

## 2022-05-05 RX ADMIN — METHYLPREDNISOLONE SODIUM SUCCINATE 40 MG: 40 INJECTION, POWDER, FOR SOLUTION INTRAMUSCULAR; INTRAVENOUS at 06:22

## 2022-05-05 RX ADMIN — LEVETIRACETAM 500 MG: 5 INJECTION INTRAVENOUS at 13:48

## 2022-05-05 RX ADMIN — VANCOMYCIN HYDROCHLORIDE 125 MG: 1 INJECTION, POWDER, LYOPHILIZED, FOR SOLUTION INTRAVENOUS at 12:09

## 2022-05-05 RX ADMIN — HEPARIN SODIUM 5000 UNITS: 5000 INJECTION INTRAVENOUS; SUBCUTANEOUS at 08:56

## 2022-05-05 RX ADMIN — HEPARIN SODIUM 5000 UNITS: 5000 INJECTION INTRAVENOUS; SUBCUTANEOUS at 15:17

## 2022-05-05 RX ADMIN — INSULIN LISPRO 6 UNITS: 100 INJECTION, SOLUTION INTRAVENOUS; SUBCUTANEOUS at 12:10

## 2022-05-05 RX ADMIN — INSULIN LISPRO 4 UNITS: 100 INJECTION, SOLUTION INTRAVENOUS; SUBCUTANEOUS at 17:39

## 2022-05-05 RX ADMIN — INSULIN GLARGINE 20 UNITS: 100 INJECTION, SOLUTION SUBCUTANEOUS at 21:53

## 2022-05-05 RX ADMIN — INSULIN GLARGINE 20 UNITS: 100 INJECTION, SOLUTION SUBCUTANEOUS at 08:56

## 2022-05-05 RX ADMIN — PROPOFOL 40 MCG/KG/MIN: 10 INJECTION, EMULSION INTRAVENOUS at 21:52

## 2022-05-05 RX ADMIN — METHYLPREDNISOLONE SODIUM SUCCINATE 40 MG: 40 INJECTION, POWDER, FOR SOLUTION INTRAMUSCULAR; INTRAVENOUS at 13:45

## 2022-05-05 RX ADMIN — INSULIN LISPRO 8 UNITS: 100 INJECTION, SOLUTION INTRAVENOUS; SUBCUTANEOUS at 08:55

## 2022-05-05 RX ADMIN — CHLORHEXIDINE GLUCONATE 15 ML: 1.2 RINSE ORAL at 09:04

## 2022-05-05 RX ADMIN — VANCOMYCIN HYDROCHLORIDE 125 MG: 1 INJECTION, POWDER, LYOPHILIZED, FOR SOLUTION INTRAVENOUS at 00:06

## 2022-05-05 RX ADMIN — HYDRALAZINE HYDROCHLORIDE 100 MG: 50 TABLET, FILM COATED ORAL at 17:39

## 2022-05-05 RX ADMIN — PROPOFOL 30 MCG/KG/MIN: 10 INJECTION, EMULSION INTRAVENOUS at 00:22

## 2022-05-05 RX ADMIN — PROPOFOL 25 MCG/KG/MIN: 10 INJECTION, EMULSION INTRAVENOUS at 07:51

## 2022-05-05 RX ADMIN — SODIUM CHLORIDE, PRESERVATIVE FREE 20 MG: 5 INJECTION INTRAVENOUS at 08:56

## 2022-05-05 RX ADMIN — HEPARIN SODIUM 5000 UNITS: 5000 INJECTION INTRAVENOUS; SUBCUTANEOUS at 21:53

## 2022-05-05 ASSESSMENT — PULMONARY FUNCTION TESTS
PIF_VALUE: 27
PIF_VALUE: 28
PIF_VALUE: 26
PIF_VALUE: 25
PIF_VALUE: 26
PIF_VALUE: 29
PIF_VALUE: 27
PIF_VALUE: 19
PIF_VALUE: 32
PIF_VALUE: 23
PIF_VALUE: 27
PIF_VALUE: 29
PIF_VALUE: 30
PIF_VALUE: 24
PIF_VALUE: 18
PIF_VALUE: 28
PIF_VALUE: 19
PIF_VALUE: 23
PIF_VALUE: 31
PIF_VALUE: 18
PIF_VALUE: 28
PIF_VALUE: 18
PIF_VALUE: 23
PIF_VALUE: 23
PIF_VALUE: 18
PIF_VALUE: 30
PIF_VALUE: 25
PIF_VALUE: 18

## 2022-05-05 ASSESSMENT — PAIN SCALES - GENERAL
PAINLEVEL_OUTOF10: 0

## 2022-05-05 NOTE — PROGRESS NOTES
Patient seen in consult for trach and peg. Full consult to follow. Nursing and staff still working on getting someone to follow through on medical decision making. Will not plan for tomorrow. Will recheck with chart and nursing tomorrow to see if a decision can be made and plan for trach and peg next week.

## 2022-05-05 NOTE — PROGRESS NOTES
Hospitalist Progress Note    Patient:  Maykel Benoit  YOB: 1970  Date of Service: 5/5/2022  MRN: [id number]   Acct: [de-identified]   Primary Care Physician: ROMINA Tomlinson  Advance Directive: Full Code  Admit Date: 4/24/2022       Hospital Day: 11  Referring Provider: Yadira Yo DO    Patient Seen, Chart, Consults, Notes, Labs, Radiology studies reviewed. Subjective:  Maykel Benoit is a 47 y.o. female  whom we are following for left acute MCA infarct, seizures, hypoxemic respiratory failure, ESRD. No significant clinical change. Her significant other has agreed to proceed with trach and PEG. She is on assist-control rate of 14, tidal volume of 450, 5 of PEEP, FiO2 0.35.     Allergies:  Penicillins, Lamisil advanced [terbinafine], Reglan [metoclopramide], and Stadol [butorphanol]    Medicines:  Current Facility-Administered Medications   Medication Dose Route Frequency Provider Last Rate Last Admin    insulin glargine (LANTUS) injection vial 20 Units  20 Units SubCUTAneous BID Yadira Yo DO   20 Units at 05/05/22 0856    hydrALAZINE (APRESOLINE) injection 10 mg  10 mg IntraVENous Q6H PRN Hollie Boeck, MD   10 mg at 05/03/22 1434    aspirin chewable tablet 81 mg  81 mg Oral Daily Hollie Boeck, MD   81 mg at 05/05/22 0856    racepinephrine HCl (VAPONEFPRIN) 2.25 % nebulizer solution NEBU 11.25 mg  11.25 mg Nebulization Q4H PRN Hollie Boeck, MD   11.25 mg at 05/03/22 1408    sodium chloride nebulizer 0.9 % solution 3 mL  3 mL Nebulization Q4H PRN Hollie Boeck, MD        methylPREDNISolone sodium (SOLU-MEDROL) injection 40 mg  40 mg IntraVENous Q8H Hollie Boeck, MD   40 mg at 05/05/22 6283    propofol injection  5-50 mcg/kg/min IntraVENous Continuous Hollie Boeck, MD 9 mL/hr at 05/05/22 0751 25 mcg/kg/min at 05/05/22 0751    heparin (porcine) injection 5,000 Units  5,000 Units SubCUTAneous TID Hollie Boeck, MD   5,000 Units at 05/05/22 4566 [id number] [facility] Does not apply PRN Ramses Pound, DO        dexmedetomidine [facility]) 400 mcg in sodium chloride 0.9 % 100 mL infusion  0.1-1.5 mcg/kg/hr IntraVENous Continuous Ramses Pound, DO   Stopped at 05/03/22 1143    menthol-zinc oxide (CALMOSEPTINE) 0.44-20.6 % ointment   Topical BID Puneet Noyola MD   Given at 05/04/22 0531    sodium phosphate 10.5 mmol in sodium chloride 0.9 % 250 mL IVPB  0.16 mmol/kg IntraVENous PRN Patience Walter, DO        Or    sodium phosphate 21 mmol in sodium chloride 0.9 % 250 mL IVPB  0.32 mmol/kg IntraVENous PRN Patience Walter, DO        insulin lispro (HUMALOG) injection vial 0-12 Units  0-12 Units SubCUTAneous TID WC Ramses Hardin, DO   6 Units at 05/05/22 1210    insulin lispro (HUMALOG) injection vial 0-6 Units  0-6 Units SubCUTAneous Nightly Ramses Hardin, DO   3 Units at 05/04/22 2148    vancomycin (VANCOCIN) oral solution 125 mg  125 mg Oral 4 times per day Ramses Pound, DO   125 mg at 05/05/22 1209    hydrALAZINE (APRESOLINE) tablet 100 mg  100 mg Oral 3 times per day Patience Walter, DO   100 mg at 05/05/22 2771    glucagon (rDNA) injection 1 mg  1 mg IntraMUSCular PRN Ramses Pound, DO        dextrose 5 % solution  100 mL/hr IntraVENous PRN Ramses Pound, DO        glucose chewable tablet 16 g  4 tablet Oral PRN Ramses Pound, DO        dextrose bolus (hypoglycemia) 10% 125 mL  125 mL IntraVENous PRN Ramses Pound, DO   Stopped at 04/25/22 4493    Or    dextrose bolus (hypoglycemia) 10% 250 mL  250 mL IntraVENous PRN Ramses Pound, DO        sodium chloride flush 0.9 % injection 5-40 mL  5-40 mL IntraVENous 2 times per day Ramses Pound, DO   10 mL at 05/05/22 4401    sodium chloride flush 0.9 % injection 5-40 mL  5-40 mL IntraVENous PRN Ramses Pound, DO        0.9 % sodium chloride infusion   IntraVENous PRN Ramses Pound, DO   Stopped at 05/03/22 2005    LORazepam (ATIVAN) injection 1 mg  1 mg IntraVENous Q5 Min PRN [place] Curb, DO        ondansetron (ZOFRAN-ODT) disintegrating tablet 4 mg  4 mg Oral Q8H PRN [place] Curb, DO        Or    [facility] injection 4 mg  4 mg IntraVENous Q6H PRN [place] Curb, DO        polyethylene glycol [facility]) packet 17 g  17 g Oral Daily PRN [place] Curb, DO        acetaminophen (TYLENOL) tablet 650 mg  650 mg Oral Q6H PRN [place] Curb, DO   650 mg at 04/25/22 5470    Or    acetaminophen (TYLENOL) suppository 650 mg  650 mg Rectal Q6H PRN [place] Curb, DO        levETIRAcetam (KEPPRA) 500 mg/100 mL IVPB  500 mg IntraVENous Q12H [place] Curb, DO   Stopped at 05/05/22 0112    chlorhexidine (PERIDEX) 0.12 % solution 15 mL  15 mL Mouth/Throat BID [place] Curb, DO   15 mL at 05/05/22 2925    famotidine (PEPCID) 20 mg in sodium chloride (PF) 10 mL injection  20 mg IntraVENous Daily [place] Curb, DO   20 mg at 05/05/22 1160    magic butt cream   Topical Q4H PRN [place] Curb, DO   Given at 05/03/22 0940    magnesium sulfate 1000 mg in dextrose 5% 100 mL IVPB  1,000 mg IntraVENous PRN Patience Walter, DO        potassium bicarb-citric acid (EFFER-K) effervescent tablet 40 mEq  40 mEq Oral PRN Patience Watler, DO        Or    potassium chloride 10 mEq/100 mL IVPB (Peripheral Line)  10 mEq IntraVENous PRN Marvelyn Yale, DO   Stopped at 04/25/22 1932       Past Medical History:  Past Medical History:   Diagnosis Date    Acute ischemic stroke ([facility] 4/30/2022    Arthritis     Chronic kidney disease, stage 5, kidney failure (HCC)     Closed fracture of right shoulder girdle, with routine healing, subsequent encounter     DM (diabetes mellitus) type II controlled with renal manifestation ([facility] 06/1993    Gastroparesis     GERD (gastroesophageal reflux disease)     Hemodialysis patient ([facility]     dialysis on tues, thur, and sat at [facility]    Hypertension  Hypothyroid     Nasal congestion     recent    Neuropathy     Noncompliance with medications     Palliative care patient 10/08/2019    Restless leg syndrome        Past Surgical History:  Past Surgical History:   Procedure Laterality Date    BREAST SURGERY Left 2008    infected milk gland removed    BREAST SURGERY Right 5/25/2021    WEDGE EXCISION RIGHT BREAST DUCT WITH LACRIMAL DUCT PROBE, PEC BLOCK performed by Norberto Interiano MD at [address] [facility]      2008    COLONOSCOPY Left 9/17/2020    Dr Chip Coffey hepatic flexure-Severe tortuosity with severe spasming, 1 yr recall    DIALYSIS FISTULA CREATION Left 1/25/2019    REVISION LEFT UPPER EXTREMITY AV ACCESS WITH LEFT BRACHIAL ARTERY TO LEFT AXILLARY VEIN WITH  INTERPOSITIONAL ARTEGRAFT   performed by Caitlin Parham MD at [address]  2012    [address] Right 1/25/2019    INSERTION OF RIGHT INTERNAL JUGULAR HEMODIALYSIS CATHETER performed by Caitlin Parham MD at [address] [facility]  08/17/2018    1.  Moderately increased stainable iron identified by special stain in Kupffer cells and occasional hepatocytes. Negative for evidence of significant portal or lobular inflammation. Negative for evidence of significant fibrosis    OK COLONOSCOPY W/BIOPSY SINGLE/MULTIPLE N/A 10/20/2017    Dr James Pedersen prep-Tubular AP (-) dysplasia x 2--3 yr recall    OK EGD TRANSORAL BIOPSY SINGLE/MULTIPLE N/A 12/27/2016    Dr Velia Londono EGD TRANSORAL BIOPSY SINGLE/MULTIPLE N/A 10/20/2017    Dr Del Cid [facility] (-)   [address] [facility] VASCULAR SURGERY Left 2016    fistula by Dr Leonard Sylvester at [address]  10/02/2017    [facility]. Left upper fistulograms/venograms, balloon angioplasty cephalic vein arch [id number] conquest.    VASCULAR SURGERY  08/2018    FISTULOGRAM    VASCULAR SURGERY  10/29/2018    [facility]. Left upper fistulograms/venograms    VASCULAR SURGERY  12/19/2018    [facility]. Marisol Crawford upper arteriograms.  VASCULAR SURGERY  2019    [facility]. Removal of tunneled dialyis catheter right internal jugular vein.  VASCULAR SURGERY  2019    [facility]. Left upper extremity fistulogram including venography to the superior vena cava. Balloon angioplasty left subclavian vein with 10mm x 40mm conquest balloon. Balloon angioplasty mid/proximal upper arm cephalic vein with 35VA x 40mm conquest balloon. Venograms after balloon angioplasty. Stent left mid/proximal upper arm cephalic vein stenosis fluency 10mm x 60mm self-expanding covered stent. Balloon     VASCULAR SURGERY  2019    cont angioplasty stent with 10mm x 40mm conquest balloon. Completion venograms left upper extremity.        Family History  Family History   Problem Relation Age of Onset    Colon Cancer Mother          at 63    Colon Polyps Mother     Lung Cancer Father          at 79    Colon Polyps Father     Stomach Cancer Sister     Breast Cancer Sister 27    Depression Daughter 25        committed suicide    Liver Cancer Neg Hx     Liver Disease Neg Hx     Esophageal Cancer Neg Hx     Rectal Cancer Neg Hx        Social History  Social History     Socioeconomic History    Marital status: Single     Spouse name: Not on file    Number of children: 2    Years of education: Not on file    Highest education level: Not on file   Occupational History    Not on file   Tobacco Use    Smoking status: Current Every Day Smoker     Packs/day: 0.50     Years: 33.00     Pack years: 16.50     Types: Cigarettes    Smokeless tobacco: Never Used    Tobacco comment: NOW at 1/4 PD, started at age 24 and has averaged 2 PPD   Vaping Use    Vaping Use: Never used   Substance and Sexual Activity    Alcohol use: No    Drug use: Not Currently     Types: Marijuana Fronie Ron)    Sexual activity: Not Currently     Partners: Male   Other Topics Concern    Not on file   Social History Narrative    Not on file     Social Determinants of Health Financial Resource Strain:     Difficulty of Paying Living Expenses: Not on file   Food Insecurity:     Worried About Running Out of Food in the Last Year: Not on file    Kendy of Food in the Last Year: Not on file   Transportation Needs:     Lack of Transportation (Medical): Not on file    Lack of Transportation (Non-Medical): Not on file   Physical Activity:     Days of Exercise per Week: Not on file    Minutes of Exercise per Session: Not on file   Stress:     Feeling of Stress : Not on file   Social Connections:     Frequency of Communication with Friends and Family: Not on file    Frequency of Social Gatherings with Friends and Family: Not on file    Attends [employer] Services: Not on file    Active Member of [address] [place] [address] [employer] or Organizations: Not on file    Attends Club or Organization Meetings: Not on file    Marital Status: Not on file   Intimate Partner Violence:     Fear of Current or Ex-Partner: Not on file    Emotionally Abused: Not on file    Physically Abused: Not on file    Sexually Abused: Not on file   Housing Stability:     Unable to Pay for Housing in the Last Year: Not on file    Number of [place] in the Last Year: Not on file    Unstable Housing in the Last Year: Not on file         Review of Systems:    Review of Systems   Unable to perform ROS: Intubated           Objective:  Blood pressure (!) 163/68, pulse 76, temperature 96.9 °F (36.1 °C), temperature source Temporal, resp. rate 14, height 5' 6\" (1.676 m), weight 132 lb 0.9 oz (59.9 kg), SpO2 100 %. Intake/Output Summary (Last 24 hours) at 5/5/2022 1331  Last data filed at 5/5/2022 1200  Gross per 24 hour   Intake 1649.84 ml   Output 120 ml   Net 1529.84 ml       Physical Exam  Vitals and nursing note reviewed. Constitutional:       Appearance: She is ill-appearing. Interventions: She is sedated and intubated. HENT:      Head: Normocephalic and atraumatic.       Right Ear: External ear normal.      Left Ear: External ear normal.      Nose: Nose normal.      Mouth/Throat:      Mouth: Mucous membranes are moist.   Eyes:      Conjunctiva/sclera: Conjunctivae normal.      Pupils: Pupils are equal, round, and reactive to light. Cardiovascular:      Rate and Rhythm: Normal rate and regular rhythm. Heart sounds: Normal heart sounds. Pulmonary:      Effort: Pulmonary effort is normal. She is intubated. Breath sounds: Normal breath sounds. Abdominal:      General: Abdomen is flat. Palpations: Abdomen is soft. Musculoskeletal:      Cervical back: Neck supple. No rigidity. No muscular tenderness. Skin:     General: Skin is warm and dry. Neurological:      Mental Status: She is alert. Labs:  BMP:   Recent Labs     05/03/22 0215 05/03/22  1831 05/04/22 0150 05/04/22  0404 05/05/22 0135     --  133*  --  136   K 3.8   < > 4.4 4.1 4.2   CL 92*  --  89*  --  91*   CO2 28  --  26  --  28   PHOS 3.2  --  5.0*  --  4.9*   BUN 28*  --  51*  --  43*   CREATININE 2.8*  --  3.9*  --  2.9*   CALCIUM 8.8  --  9.4  --  9.1    < > = values in this interval not displayed. CBC:   Recent Labs     05/03/22 0215 05/04/22 0150 05/05/22 0135   WBC 5.8 8.9 7.5   HGB 8.8* 9.8* 9.9*   HCT 28.4* 32.2* 31.7*   MCV 92.5 93.6 91.9    202 266     LIVER PROFILE:   Recent Labs     05/03/22 0215 05/04/22 0150 05/05/22 0135   AST 18 15 13   ALT 8 8 6   BILITOT 0.3 0.3 <0.2   ALKPHOS 107* 114* 100     PT/INR:   Recent Labs     05/03/22 0215 05/04/22 0150 05/05/22 0135   PROTIME 13.6 14.3 13.9   INR 1.04 1.11 1.08     APTT: No results for input(s): APTT in the last 72 hours. BNP:  No results for input(s): BNP in the last 72 hours. Ionized Calcium:No results for input(s): IONCA in the last 72 hours.   Magnesium:  Recent Labs     05/03/22 0215 05/04/22 0150 05/05/22 0135   MG 2.3 2.7* 2.4     Phosphorus:  Recent Labs     05/03/22 0215 05/04/22 0150 05/05/22 0135   PHOS 3.2 5.0* 4.9*     HgbA1C: No results for input(s): LABA1C in the last 72 hours. Hepatic:   Recent Labs     05/03/22  0215 05/04/22  0150 05/05/22  0135   ALKPHOS 107* 114* 100   ALT 8 8 6   AST 18 15 13   PROT 6.0* 6.2* 5.9*   BILITOT 0.3 0.3 <0.2   LABALBU 3.1* 3.1* 2.9*     Lactic Acid: No results for input(s): LACTA in the last 72 hours. Troponin: No results for input(s): CKTOTAL, CKMB, TROPONINT in the last 72 hours. ABGs: No results for input(s): PH, PCO2, PO2, HCO3, O2SAT in the last 72 hours. CRP:  No results for input(s): CRP in the last 72 hours. Sed Rate:  No results for input(s): SEDRATE in the last 72 hours. Cultures:   No results for input(s): CULTURE in the last 72 hours. No results for input(s): BC, [place] in the last 72 hours. No results for input(s): CXSURG in the last 72 hours. Radiology reports as per the Radiologist  Radiology: CT Head WO Contrast    Result Date: 4/24/2022  CT HEAD WO CONTRAST 4/24/2022 2:01 PM HISTORY: Altered mental status COMPARISON: CT scan dated 11/18/2021 DOSE LENGTH PRODUCT: 766 mGy cm TECHNIQUE: Helical tomographic images of the brain were obtained without the use of intravenous contrast. Automated exposure control was also utilized to decrease patient radiation dose. FINDINGS: There is no evidence of evolving large vascular territory infarct. No visualized intra-axial or extra-axial hemorrhage. No mass lesion is identified. Normal size and configuration of the ventricular system. The basal cisterns are symmetric. Posterior fossa structures are unremarkable. The included orbits and their contents are unremarkable. Chronic paranasal sinus disease on the left. The visualized osseous structures and overlying soft tissues of the skull and face are unremarkable. 1. No acute intracranial process.  Signed by Dr Isai Myers    XR CHEST PORTABLE    Result Date: 4/24/2022  XR CHEST PORTABLE 4/24/2022 4:27 PM HISTORY: ET tube, enteric tube  Technique: Single AP view of the chest COMPARISONS: 4/24/2022 FINDINGS: Endotracheal tube is identified with tip essentially touching the krysta. Enteric tube courses into the stomach with tip curled in the fundus back towards the gastroesophageal junction. The lungs are clear and well expanded. Heart size is stable. Pulmonary vasculature are nondilated. No pleural effusion or pneumothorax. 1. Endotracheal tube is deep, tip essentially touching the krysta. Lungs are clear and well expanded. I would recommend 2-3 cm withdrawal for a more optimal positioning. The results of this exam were discussed with Joe (ICU nursing) on 4/24/2022 at 1640 hours Signed by Dr Juju Fierro    XR CHEST PORTABLE    Result Date: 4/24/2022  XR CHEST PORTABLE 4/24/2022 1:17 PM HISTORY: ET tube placement  Technique: Single AP view of the chest COMPARISONS: 4/24/2022 FINDINGS: Status post endotracheal intubation. ET tube is in good position. Lungs are well-expanded. Enteric tube curls on itself in the midesophagus and then extends back up into the throat. Heart size is stable. Pulmonary vasculature are nondilated. 1. ET tube is in good position. Lungs are clear and well expanded. 2. Enteric tube curls on itself in the mid esophagus before extending back up into the throat. This will need repositioned. The results of this exam were discussed with Dr. [address] [facility] on 4/24/2022 at 1329 hours Signed by Dr Juju Fierro    XR CHEST PORTABLE    Result Date: 4/24/2022  XR CHEST PORTABLE 4/24/2022 12:34 PM HISTORY: Altered mental status  Technique: Single AP view of the chest COMPARISONS: Chest exam dated 4/1/2022 FINDINGS: No lung consolidation. No pleural effusion or pneumothorax. Cardiomediastinal silhouette and pulmonary vasculature are unremarkable. No acute bony abnormality. Left subclavian region vascular stent with additional stent projecting over the left humerus. No acute cardiopulmonary process.  Signed by Dr Maddie Howell Seizure. Continue Keppra. Neurology following.     Acute left ischemic MCA stroke. Continue supportive care.     Acute hypoxemic respiratory failure. Continue ventilatory support. Proceed with trach and PEG.    Nonketotic hyperosmolar hyperglycemia. Resolved  Transitioned to subcutaneous insulin     End-stage renal disease. Dialysis today for additional ultrafiltration.     Please document 37 minutes of critical care time for patient assessment, chart review, discussion with staff, .       Erica Guevara,

## 2022-05-05 NOTE — PLAN OF CARE
Nutrition Problem #1: Inadequate oral intake  Intervention: Food and/or Nutrient Delivery: Continue NPO,Continue Current Tube Feeding  Nutritional

## 2022-05-05 NOTE — PROGRESS NOTES
Pt sedated on Propofol at 40mcg/kg/min. HR sinus 78 /64. RR 14 Sats 100%. Pt remains on vent  R 14 FIO2 35%. PEEP 5. ET at 24 lip. OG at 65cm with Vital AF 2.1 logan infusing at 54ml/hr. No residual noted at this time. Dialysis is in room setting up to dialyze pt . Lungs CTA diminished in lower lobes. Pulses palpable. Trace edema upper ext hands. No lower ext edema. Rectal tube with loose brown stool in tubing and collection bag. Pt suction and no secretions returned. Clear oral secretions noted. Pupils = sluggish reactive. Pt is randomly moving feet slightly bilaterally and L arm. Pt does not respond to command , but responds to pain in all extremities except R hand. Will continue care as ordered.  Electronically signed by Diego Sandra RN on 5/4/2022 at 7:31 PM

## 2022-05-05 NOTE — PROGRESS NOTES
Palliative Care Progress Note  5/5/2022 8:27 AM    Patient:  Frances Vera  YOB: 1970  Primary Care Physician: ROMINA Barraza  Advance Directive: Full Code  Admit Date: 4/24/2022       Hospital Day: 11  Portions of this note have been copied forward, however, changed to reflect the most current clinical status of this patient. CHIEF COMPLAINT/REASON FOR CONSULTATION Goals of care    SUBJECTIVE:  Ms. Vernon Peak clinically remains the same. Intubated/sedated on minimal vent settings. Review of Systems:   14 point review of systems is negative except as specifically addressed above. Objective:   VITALS:  BP (!) 176/64   Pulse 72   Temp 96.9 °F (36.1 °C) (Temporal)   Resp 14   Ht 5' 6\" (1.676 m)   Wt 132 lb 0.9 oz (59.9 kg)   SpO2 100%   BMI 21.31 kg/m²   24HR INTAKE/OUTPUT:      Intake/Output Summary (Last 24 hours) at 5/5/2022 0827  Last data filed at 5/5/2022 0600  Gross per 24 hour   Intake 1644.1 ml   Output 120 ml   Net 1524.1 ml     General appearance: 48 yo female, chronically ill appearing, intubated/sedated, no acute distress  Head: Normocephalic, without obvious abnormality, atraumatic  Eyes: conjunctivae/corneas clear. PERRL  Ears: normal external ears and nose  Neck: supple, symmetrical, trachea midline   Lungs: diminished in lower lobes bilaterally to asucultation, mechanically ventilated, equal chest rise  Heart: regular rate and rhythm, S1, S2 normal, no murmur  Abdomen: soft, no grimacing w/ palpation; non-distended, bowel sounds present .  FMS in place with diarrhea noted   Extremities: trace edema in BUE, No lower extremity edema,  No erythema, no grimacing w/ palpation   Skin: warm, dry  Neurologic: Intubated/sedated, does not respond to voice, localizes to pain but less over right side    Medications:      propofol 25 mcg/kg/min (05/05/22 0751)    niCARdipine Stopped (05/02/22 1622)    dexmedetomidine HCl in NaCl Stopped (05/03/22 1143)    dextrose      sodium chloride Stopped (05/03/22 2005)      insulin glargine  20 Units SubCUTAneous BID    aspirin  81 mg Oral Daily    methylPREDNISolone  40 mg IntraVENous Q8H    heparin (porcine)  5,000 Units SubCUTAneous TID    menthol-zinc oxide   Topical BID    insulin lispro  0-12 Units SubCUTAneous TID WC    insulin lispro  0-6 Units SubCUTAneous Nightly    vancomycin  125 mg Oral 4 times per day    hydrALAZINE  100 mg Oral 3 times per day    sodium chloride flush  5-40 mL IntraVENous 2 times per day    levetiracetam  500 mg IntraVENous Q12H    chlorhexidine  15 mL Mouth/Throat BID    famotidine (PEPCID) injection  20 mg IntraVENous Daily     hydrALAZINE, racepinephrine HCl, sodium chloride nebulizer, Peppermint Spirit, sodium phosphate IVPB **OR** sodium phosphate IVPB, glucagon (rDNA), dextrose, glucose, dextrose bolus (hypoglycemia) **OR** dextrose bolus (hypoglycemia), sodium chloride flush, sodium chloride, LORazepam, ondansetron **OR** ondansetron, polyethylene glycol, acetaminophen **OR** acetaminophen, magic butt cream, magnesium sulfate, potassium alternative oral replacement **OR** potassium chloride  Diet NPO  ADULT TUBE FEEDING; Orogastric; Peptide Based High Protein; Continuous; 47; No; 0; Other (specify); no water flush     Lab and other Data:     Recent Labs     05/03/22 0215 05/04/22 0150 05/05/22  0135   WBC 5.8 8.9 7.5   HGB 8.8* 9.8* 9.9*    202 266     Recent Labs     05/03/22 0215 05/03/22  1831 05/04/22  0150 05/04/22  0404 05/05/22  0135     --  133*  --  136   K 3.8   < > 4.4 4.1 4.2   CL 92*  --  89*  --  91*   CO2 28  --  26  --  28   BUN 28*  --  51*  --  43*   CREATININE 2.8*  --  3.9*  --  2.9*   GLUCOSE 218*  --  360*  --  252*    < > = values in this interval not displayed.      Recent Labs     05/03/22 0215 05/04/22  0150 05/05/22  0135   AST 18 15 13   ALT 8 8 6   BILITOT 0.3 0.3 <0.2   ALKPHOS 107* 114* 100       Assessment/Plan   Principal Problem:    Acute ischemic stroke [facility])  Active Problems:    Weakness    Severe malnutrition (HCC)    Gastroesophageal reflux disease without esophagitis    Acquired hypothyroidism    Anemia in chronic kidney disease (CKD)    Type 2 diabetes mellitus with chronic kidney disease on chronic dialysis, with long-term current use of insulin (HCC)    Acute encephalopathy    Respiratory failure ([address]    Uncontrolled type 2 diabetes mellitus with complication, with long-term current use of insulin (HCC)    ESRD (end stage renal disease) on dialysis ([facility]    PVD (peripheral vascular disease) ([facility]    ESRD on hemodialysis ([facility] [facility]    Hyponatremia    Hyperglycemia due to type 2 diabetes mellitus ([facility] [facility]    Uncontrolled hypertension    Seizure ([facility] [address] [facility]    Palliative care patient    Uncontrolled type 2 diabetes mellitus with hyperosmolar nonketotic hyperglycemia (HCC)    Altered mental state    Acute hypoxemic respiratory failure (HCC)    Generalized weakness    UTI (urinary tract infection)    Closed fracture of left distal femur (HCC)    History of infection with vancomycin resistant Enterococcus (VRE)  Resolved Problems:    * No resolved hospital problems. *      Visit Summary:  Chart reviewed, patient discussed with nursing staff. Reviewed health issues, work up and treatment plan as well as factors that lead to hospitalization. Report obtained from RN with no acute events overnight. Ms. Germain Dorsey is seen at bedside this morning without significant change. She remains intubated and sedated. Tolerated dialysis well yesterday. Pts significant other, Maria Alejandra Shaffer, had verbalized yesterday to pursue trach/PEG for continued aggressive measures. I have discussed plan of care with hospitalist. Maria Alejandra Shaffer was supposed to visit at bedside today. I have attempted to call him twice this afternoon with no answer. VM left with contact information to call back. Will follow. **Received a call back from Maria Alejandra Shaffer this afternoon. He has spoken with pts sister, Freddy Blevins.  We discussed pts status and he

## 2022-05-05 NOTE — CONSULTS
GI has been consulted for PEG placement. Surgery also has been consulted for tracheostomy. Typically surgery will perform both procedures together. If surgery is unable to place PEG then will be happy to proceed with consultation and intervention. Will reassess after surgical consultation has been completed.

## 2022-05-05 NOTE — PROGRESS NOTES
Comprehensive Nutrition Assessment    Type and Reason for Visit:  Reassess    Nutrition Recommendations/Plan:   1. When current EN bottle is complete switch EN to Vital High Protein     Malnutrition Assessment:  Malnutrition Status:  Severe malnutrition (05/05/22 1109)    Context:  Acute Illness     Findings of the 6 clinical characteristics of malnutrition:  Energy Intake:  No significant decrease in energy intake  Weight Loss:  Greater than 2% over 1 week     Body Fat Loss: Moderate body fat loss Orbital,Buccal region   Muscle Mass Loss: Moderate muscle mass loss Thigh (quadraceps),Temples (temporalis)  Fluid Accumulation:  No significant fluid accumulation Extremities   Strength:  Not Performed    Nutrition Assessment:    Remains on vent. Propofol decreased to 9ml/hr. TF formula being change out by RN to Vital High Protein. Na+ level WNL today. Accuchek's still elevated    Nutrition Related Findings:    intubated. Propofol continues Wound Type: Deep Tissue Injury       Current Nutrition Intake & Therapies:    Average Meal Intake: NPO  Average Supplements Intake: NPO  Current Tube Feeding (TF) Orders:  · Feeding Route: Orogastric  · Formula: Peptide Based High Protein  · Schedule: Continuous  · Feeding Regimen: Vital HighProtein at 47ml/hr  · Additives/Modulars: None  · Water Flushes: none  · Current TF & Flush Orders Provides: Vital AF 1.2  @ 47ml = 1353 kcals with 84g protein, 124g CHO and 914 ml free water from formula  · Goal TF & Flush Orders Provides: Vital High Protein at 47ml/hr = 1128 kcals with 98g protein, 123g CHO and 945 ml free water from formula. Propofol adds another 2378 NP kcals      Anthropometric Measures:  Height: 5' 6\" (167.6 cm)  Ideal Body Weight (IBW): 130 lbs (59 kg)    Admission Body Weight: 165 lb (74.8 kg) (estimated)  Current Body Weight: 132 lb 0.9 oz (59.9 kg), 98.5 % IBW.  Weight Source: Bed Scale  Current BMI (kg/m2): 21.3  Usual Body Weight: 144 lb (65.3 kg) (1/2022)  % Weight Change (Calculated): 0.5  BMI Categories: Normal Weight (BMI 18.5-24. 9)    Estimated Daily Nutrient Needs:  Energy Requirements Based On: Kcal/kg  Weight Used for Energy Requirements: Current  Energy (kcal/day): 9437-2808 kcals (20-25 kcals/kg)  Weight Used for Protein Requirements: Current  Protein (g/day): 90g  Method Used for Fluid Requirements: 1 ml/kcal  Fluid (ml/day): 6119-4146 ml    Nutrition Diagnosis:   · Inadequate oral intake related to acute injury/trauma,impaired respiratory function as evidenced by NPO or clear liquid status due to medical condition,intubation,nutrition support - enteral nutrition      Nutrition Interventions:   Food and/or Nutrient Delivery: Continue NPO,Continue Current Tube Feeding  Nutrition Education/Counseling: Education not indicated  Coordination of Nutrition Care: Continue to monitor while inpatient  Plan of Care discussed with: nursing    Goals:  Previous Goal Met: Progressing toward Goal(s)  Goals: Meet at least 75% of estimated needs       Nutrition Monitoring and Evaluation:   Behavioral-Environmental Outcomes: None Identified  Food/Nutrient Intake Outcomes: Enteral Nutrition Intake/Tolerance  Physical Signs/Symptoms Outcomes: Biochemical Data,Weight,Skin,Nutrition Focused Physical Findings,Fluid Status or Edema,Hemodynamic Status    Discharge Planning:     Too soon to determine     Chestine Slava MS, RD, LD  Contact: [phone]

## 2022-05-05 NOTE — PROGRESS NOTES
[facility] Neurology Progress Note      Patient:   Mindy Christianson  MR#:    [id number]   Room:    0702/702-01   YOB: 1970  Date of Progress Note: 5/5/2022  Time of Note                           8:55 AM  Consulting Physician:  Bev Voss DO  Attending Physician:  Dheeraj Giraldo DO      INTERVAL HISTORY:  No acute events, intubated, sedated, still localizing to pain, less withdrawal to pain over the the right side, remains unchanged. REVIEW OF SYSTEMS:  Unable to obtain     PHYSICAL EXAM:    Constitutional    BP (!) 176/64   Pulse 72   Temp 96.9 °F (36.1 °C) (Temporal)   Resp 14   Ht 5' 6\" (1.676 m)   Wt 132 lb 0.9 oz (59.9 kg)   SpO2 100%   BMI 21.31 kg/m²   General appearance: Intubated, sedated   EYES -   Conjunctiva normal  Pupillary exam as below, see CN exam in the neurologic exam  ENT-    No scars, masses, or lesions over external nose or ears  Oropharynx - intubated  Cardiovascular -   No clubbing, cyanosis  Pulmonary-   Mechanically ventilated   Musculoskeletal    No significant wasting of muscles noted  Gait as below, see gait exam in the neurologic exam  Muscle strength, tone, stability as below see the motor exam in the neurologic exam.   No bony deformities  Skin    Warm, dry, and intact to inspection and palpation. No rash, erythema, or pallor        NEUROLOGICAL EXAM     Mental status    []? Awake, alert, oriented   []? Affect attention and concentration appear appropriate  []? Recent and remote memory appears unremarkable  []? Speech normal without dysarthria or aphasia, comprehension and repetition intact. COMMENTS:  Unresponsive to voice, not following commands, does appear to localize to pain    Cranial Nerves []? No VF deficit to confrontation,  optic discs normal, no papilledema on fundoscopic exam.  []? PERRLA, EOMI, no nystagmus, conjugate eye movements, no ptosis  []? Face symmetric  []? Facial sensation intact  []?  Tongue midline no atrophy or fasciculations present  []? Palate midline, hearing to finger rub normal  []? Shoulder shrug and SCM testing normal  COMMENTS:  PERRL, Face appears sym, OCR present   Motor   []? 5/5 strength x 4 extremities  [x]? Normal bulk and tone  [x]? No tremor present  []? No rigidity or bradykinesia noted  COMMENTS: Withdrawal x 4, less over RUE   Sensory  []? Sensation intact to light touch, pin prick, vibration, and proprioception BLE  []? Sensation intact to light touch, pin prick, vibration, and proprioception BUE  COMMENTS: Limited    Coordination []? FTN normal bilaterally   []? HTS normal bilaterally  []? SANFORD normal.   COMMENTS: Limited    Reflexes  [x]? Symmetric and non-pathological  [x]? Toes downgoing bilaterally  [x]? No clonus present  COMMENTS:   Gait                  []? Normal steady gait    []? Ataxic    []? Spastic     []? Magnetic     []? Shuffling  [x]? Not assessed  COMMENTS:        LABS/IMAGING:    CT Head WO Contrast    Result Date: 4/24/2022  CT HEAD WO CONTRAST 4/24/2022 2:01 PM HISTORY: Altered mental status COMPARISON: CT scan dated 11/18/2021 DOSE LENGTH PRODUCT: 766 mGy cm TECHNIQUE: Helical tomographic images of the brain were obtained without the use of intravenous contrast. Automated exposure control was also utilized to decrease patient radiation dose. FINDINGS: There is no evidence of evolving large vascular territory infarct. No visualized intra-axial or extra-axial hemorrhage. No mass lesion is identified. Normal size and configuration of the ventricular system. The basal cisterns are symmetric. Posterior fossa structures are unremarkable. The included orbits and their contents are unremarkable. Chronic paranasal sinus disease on the left. The visualized osseous structures and overlying soft tissues of the skull and face are unremarkable. 1. No acute intracranial process.  Signed by Dr Patricio Braswell    XR CHEST PORTABLE    Result Date: 4/24/2022  XR CHEST PORTABLE 4/24/2022 4:27 PM HISTORY: ET tube, enteric tube  Technique: Single AP view of the chest COMPARISONS: 4/24/2022 FINDINGS: Endotracheal tube is identified with tip essentially touching the krysta. Enteric tube courses into the stomach with tip curled in the fundus back towards the gastroesophageal junction. The lungs are clear and well expanded. Heart size is stable. Pulmonary vasculature are nondilated. No pleural effusion or pneumothorax. 1. Endotracheal tube is deep, tip essentially touching the krysta. Lungs are clear and well expanded. I would recommend 2-3 cm withdrawal for a more optimal positioning. The results of this exam were discussed with Thea Ordaz (ICU nursing) on 4/24/2022 at 1640 hours Signed by Dr Genevieve Perry    XR CHEST PORTABLE    Result Date: 4/24/2022  XR CHEST PORTABLE 4/24/2022 1:17 PM HISTORY: ET tube placement  Technique: Single AP view of the chest COMPARISONS: 4/24/2022 FINDINGS: Status post endotracheal intubation. ET tube is in good position. Lungs are well-expanded. Enteric tube curls on itself in the midesophagus and then extends back up into the throat. Heart size is stable. Pulmonary vasculature are nondilated. 1. ET tube is in good position. Lungs are clear and well expanded. 2. Enteric tube curls on itself in the mid esophagus before extending back up into the throat. This will need repositioned. The results of this exam were discussed with Dr. JUÁREZ [facility] on 4/24/2022 at 1329 hours Signed by Dr Genevieve Perry    XR CHEST PORTABLE    Result Date: 4/24/2022  XR CHEST PORTABLE 4/24/2022 12:34 PM HISTORY: Altered mental status  Technique: Single AP view of the chest COMPARISONS: Chest exam dated 4/1/2022 FINDINGS: No lung consolidation. No pleural effusion or pneumothorax. Cardiomediastinal silhouette and pulmonary vasculature are unremarkable. No acute bony abnormality. Left subclavian region vascular stent with additional stent projecting over the left humerus. No acute cardiopulmonary process. Signed by Dr Juju Fierro      Lab Results   Component Value Date    WBC 7.5 05/05/2022    HGB 9.9 (L) 05/05/2022    HCT 31.7 (L) 05/05/2022    MCV 91.9 05/05/2022     05/05/2022     Lab Results   Component Value Date     05/05/2022    K 4.2 05/05/2022    CL 91 (L) 05/05/2022    CO2 28 05/05/2022    BUN 43 (H) 05/05/2022    CREATININE 2.9 (H) 05/05/2022    GLUCOSE 252 (H) 05/05/2022    CALCIUM 9.1 05/05/2022    PROT 5.9 (L) 05/05/2022    LABALBU 2.9 (L) 05/05/2022    BILITOT <0.2 05/05/2022    ALKPHOS 100 05/05/2022    AST 13 05/05/2022    ALT 6 05/05/2022    LABGLOM 17 (A) 05/05/2022    GFRAA 21 (L) 05/05/2022     Lab Results   Component Value Date    INR 1.08 05/05/2022    INR 1.11 05/04/2022    INR 1.04 05/03/2022    PROTIME 13.9 05/05/2022    PROTIME 14.3 05/04/2022    PROTIME 13.6 05/03/2022       RECORD REVIEW:   Previous medical records, medications were reviewed at today's visit. Nursing/physician notes, imaging, labs and vitals reviewed. ASSESSMENT:  47 y.o. admitted after an episode of unresponsiveness followed by generalized tonic-clonic event in the emergency department. She had profoundly elevated glucose, hyponatremia, underlying UTI,  end-stage renal disease with a history of noncompliance noted. MRI with large scattered left MCA distribution stroke. ECHO negative from a cardioembolic standpoint. Failed extubation, on mechanical ventilation. Exam remains largely unchanged. No clinical seizure activity noted.      PLAN:  1. Stroke is large, risk for hemorraghic transformation is felt moderate to high. Now off coumadin, on ASA, ECHO negative for cardioembolic source, no a fib on tele. Follow up hypercoagulable labs. 2.  Continue Keppra  3. Continue addressing respiratory failure, blood glucose, hyponatremia, UTI, c diff, ESRD nephrology following  4. Limit sedation as able  5.   Palliative care following, family wants continued aggressive care      Please feel free to call with any questions. [phone] (cell phone).       Mayito Gardiner DO  Board Certified Neurology

## 2022-05-05 NOTE — PROGRESS NOTES
Nephrology ([facility]) Progress Note    Patient:  Helena Luu  YOB: 1970  Date of Service: 5/5/2022  MRN: [id number]   Acct: [de-identified]   Primary Care Physician: ROMINA Adamson  Advance Directive: Full Code  Admit Date: 4/24/2022       Hospital Day: 11  Referring Provider: Fran Bowen DO    Patient independently seen and examined, Chart, Consults, Notes, Operative notes, Labs, Cardiology, and Radiology studies reviewed as available. Subjective:  Helena Luu is a 47 y.o. female for whom we were consulted for evaluation and treatment of end-stage renal disease. Patient also has a history of seizure disorder, type 2 diabetes, hypertension, frequent hospitalization with noncompliance and poorly controlled diabetes. Patient was brought to the emergency room after she was found to have unresponsiveness and witnessed grand mal seizure. She was brought in by ambulance. On arrival in the emergency room, she was found to be severely hyperglycemic and had a urinary tract infection. Patient was intubated, sedated and treated by neurology for grand mal seizure. She was also receiving IV antibiotics and insulin drip. Her blood sugar control had significantly improved. On April 25, she has received emergent hemodialysis treatment for correction of volume status as well as hyponatremia. She was moved from ICU to CCU. Today, patient remains on the ventilator and unable to participate in the history and physical examination. Events reviewed with nursing at the bedside. No family present.   No issues with dialysis yesterday      Allergies:  Penicillins, Lamisil advanced [terbinafine], Reglan [metoclopramide], and Stadol [butorphanol]    Medicines:  Current Facility-Administered Medications   Medication Dose Route Frequency Provider Last Rate Last Admin    insulin glargine (LANTUS) injection vial 20 Units  20 Units SubCUTAneous BID Fran Bowen DO   20 Units at 05/05/22 0856    hydrALAZINE (APRESOLINE) injection 10 mg  10 mg IntraVENous Q6H PRN Keith Baker MD   10 mg at 05/03/22 1434    aspirin chewable tablet 81 mg  81 mg Oral Daily Keith Baker MD   81 mg at 05/05/22 0856    racepinephrine HCl (VAPONEFPRIN) 2.25 % nebulizer solution NEBU 11.25 mg  11.25 mg Nebulization Q4H PRN Keith Baker MD   11.25 mg at 05/03/22 1408    sodium chloride nebulizer 0.9 % solution 3 mL  3 mL Nebulization Q4H PRN Keith Baker MD        methylPREDNISolone sodium (SOLU-MEDROL) injection 40 mg  40 mg IntraVENous Q8H Keith Baker MD   40 mg at 05/05/22 8445    propofol injection  5-50 mcg/kg/min IntraVENous Continuous Keith Baker MD 9 mL/hr at 05/05/22 0751 25 mcg/kg/min at 05/05/22 0751    heparin (porcine) injection 5,000 Units  5,000 Units SubCUTAneous TID Keith Baker MD   5,000 Units at 05/05/22 9466    Peppermint Spirit SPRT   Does not apply PRN Jose Yeung DO        dexmedetomidine (PRECEDEX) 400 mcg in sodium chloride 0.9 % 100 mL infusion  0.1-1.5 mcg/kg/hr IntraVENous Continuous Jose Yeung DO   Stopped at 05/03/22 1143    menthol-zinc oxide (CALMOSEPTINE) 0.44-20.6 % ointment   Topical BID Keith Baker MD   Given at 05/04/22 0531    sodium phosphate 10.5 mmol in sodium chloride 0.9 % 250 mL IVPB  0.16 mmol/kg IntraVENous PRN Patience Walter, DO        Or    sodium phosphate 21 mmol in sodium chloride 0.9 % 250 mL IVPB  0.32 mmol/kg IntraVENous PRN Patience Walter, DO        insulin lispro (HUMALOG) injection vial 0-12 Units  0-12 Units SubCUTAneous TID WC Jose Yeung DO   6 Units at 05/05/22 1210    insulin lispro (HUMALOG) injection vial 0-6 Units  0-6 Units SubCUTAneous Nightly Jose Yeung DO   3 Units at 05/04/22 2148    vancomycin (VANCOCIN) oral solution 125 mg  125 mg Oral 4 times per day Jose Yeung DO   125 mg at 05/05/22 1209    hydrALAZINE (APRESOLINE) tablet 100 mg  100 mg Oral 3 times per day Patience Walter, DO   100 mg at 05/05/22 7134    glucagon (rDNA) injection 1 mg  1 mg IntraMUSCular PRN Alli Ganong, DO        dextrose 5 % solution  100 mL/hr IntraVENous PRN Alli Ganong, DO        glucose chewable tablet 16 g  4 tablet Oral PRN Alli Ganong, DO        dextrose bolus (hypoglycemia) 10% 125 mL  125 mL IntraVENous PRN Alli Ganong, DO   Stopped at 04/25/22 2728    Or    dextrose bolus (hypoglycemia) 10% 250 mL  250 mL IntraVENous PRN Alli Ganong, DO        sodium chloride flush 0.9 % injection 5-40 mL  5-40 mL IntraVENous 2 times per day Alli Ganong, DO   10 mL at 05/05/22 8321    sodium chloride flush 0.9 % injection 5-40 mL  5-40 mL IntraVENous PRN Alli Ganong, DO        0.9 % sodium chloride infusion   IntraVENous PRN Alli Ganong, DO   Stopped at 05/03/22 2005    LORazepam (ATIVAN) injection 1 mg  1 mg IntraVENous Q5 Min PRN Alli Ganong, DO        ondansetron (ZOFRAN-ODT) disintegrating tablet 4 mg  4 mg Oral Q8H PRN Alli Ganong, DO        Or    [facility]ron [facility] injection 4 mg  4 mg IntraVENous Q6H PRN Alli Ganong, DO        polyethylene glycol (GLYCOLAX) packet 17 g  17 g Oral Daily PRN Alli Ganong, DO        acetaminophen (TYLENOL) tablet 650 mg  650 mg Oral Q6H PRN Alli Ganong, DO   650 mg at 04/25/22 1132    Or    acetaminophen (TYLENOL) suppository 650 mg  650 mg Rectal Q6H PRN Alli Ganong, DO        levETIRAcetam (KEPPRA) 500 mg/100 mL IVPB  500 mg IntraVENous Q12H Alli Ganong, DO   Stopped at 05/05/22 0112    chlorhexidine (PERIDEX) 0.12 % solution 15 mL  15 mL Mouth/Throat BID Alli Ganong, DO   15 mL at 05/05/22 0363    famotidine (PEPCID) 20 mg in sodium chloride (PF) 10 mL injection  20 mg IntraVENous Daily Alli Ganong, DO   20 mg at 05/05/22 7011    magic butt cream   Topical Q4H PRN Alli Ganong, DO   Given at 05/03/22 0940    magnesium sulfate 1000 mg in dextrose 5% 100 mL IVPB  1,000 mg IntraVENous PRN Patience Walter, DO        potassium bicarb-citric acid (EFFER-K) effervescent tablet 40 mEq  40 mEq Oral PRN Patience Walter, DO        Or    potassium chloride 10 mEq/100 mL IVPB (Peripheral Line)  10 mEq IntraVENous PRN Venetta Brody, DO   Stopped at 04/25/22 6699       Past Medical History:  Past Medical History:   Diagnosis Date    Acute ischemic stroke ([facility] 4/30/2022    Arthritis     Chronic kidney disease, stage 5, kidney failure (HCC)     Closed fracture of right shoulder girdle, with routine healing, subsequent encounter     DM (diabetes mellitus) type II controlled with renal manifestation ([facility] 06/1993    Gastroparesis     GERD (gastroesophageal reflux disease)     Hemodialysis patient ([facility]     dialysis on tues, thur, and sat at [facility]    Hypertension     Hypothyroid     Nasal congestion     recent    Neuropathy     Noncompliance with medications     Palliative care patient 10/08/2019    Restless leg syndrome        Past Surgical History:  Past Surgical History:   Procedure Laterality Date    BREAST SURGERY Left 2008    infected milk gland removed    BREAST SURGERY Right 5/25/2021    WEDGE EXCISION RIGHT BREAST DUCT WITH LACRIMAL DUCT PROBE, PEC BLOCK performed by Norberto Interiano MD at [address] [facility] [place] LAPAROSCOPIC      2008    COLONOSCOPY Left 9/17/2020    Dr Chip Coffey hepatic flexure-Severe tortuosity with severe spasming, 1 yr recall    DIALYSIS FISTULA CREATION Left 1/25/2019    REVISION LEFT UPPER EXTREMITY AV ACCESS WITH LEFT BRACHIAL ARTERY TO LEFT AXILLARY VEIN WITH  INTERPOSITIONAL ARTEGRAFT   performed by Caitlin Parham MD at [address]  2012    [address] Right 1/25/2019    INSERTION OF RIGHT INTERNAL JUGULAR HEMODIALYSIS CATHETER performed by Caitlin Parham MD at [address] LIVER BIOPSY  08/17/2018    1.  Moderately increased stainable iron identified by special stain in Kupffer cells and occasional hepatocytes. Negative for evidence of significant portal or lobular inflammation. Negative for evidence of significant fibrosis    [place] COLONOSCOPY W/BIOPSY SINGLE/MULTIPLE N/A 10/20/2017    Dr Suha Guerrero prep-Tubular AP (-) dysplasia x 2--3 yr recall    [place] EGD TRANSORAL BIOPSY SINGLE/MULTIPLE N/A 2016    Dr Arianna Sumner EGD TRANSORAL BIOPSY SINGLE/MULTIPLE N/A 10/20/2017    Dr Mayco Caal (-)   [address] [facility] VASCULAR SURGERY Left 2016    fistula by Dr Yamileth Barroso at [address]  10/02/2017    [facility]. Left upper fistulograms/venograms, balloon angioplasty cephalic vein arch [id number] conquest.    VASCULAR SURGERY  2018    FISTULOGRAM    VASCULAR SURGERY  10/29/2018    [facility]. Left upper fistulograms/venograms    VASCULAR SURGERY  2018    [facility]. Arch aortogram,left upper arteriograms.  VASCULAR SURGERY  2019    [facility]. Removal of tunneled dialyis catheter right internal jugular vein.  VASCULAR SURGERY  2019    [facility]. Left upper extremity fistulogram including venography to the superior vena cava. Balloon angioplasty left subclavian vein with 10mm x 40mm conquest balloon. Balloon angioplasty mid/proximal upper arm cephalic vein with 09ZA x 40mm conquest balloon. Venograms after balloon angioplasty. Stent left mid/proximal upper arm cephalic vein stenosis fluency 10mm x 60mm self-expanding covered stent. Balloon     VASCULAR SURGERY  2019    cont angioplasty stent with 10mm x 40mm conquest balloon. Completion venograms left upper extremity.        Family History  Family History   Problem Relation Age of Onset    Colon Cancer Mother          at 63    Colon Polyps Mother     Lung Cancer Father          at 66    Colon Polyps Father     Stomach Cancer Sister     Breast Cancer Sister 27    Depression Daughter 25        committed suicide    Liver Cancer Neg Hx     Liver Disease Neg Hx     Esophageal Cancer Neg Hx     Rectal Cancer Neg Hx        Social History  Social History     Socioeconomic History    Marital status: Single     Spouse name: Not on file    Number of children: 2    Years of education: Not on file    Highest education level: Not on file   Occupational History    Not on file   Tobacco Use    Smoking status: Current Every Day Smoker     Packs/day: 0.50     Years: 33.00     Pack years: 16.50     Types: Cigarettes    Smokeless tobacco: Never Used    Tobacco comment: NOW at 1/4 PD, started at age 24 and has averaged 2 PPD   Vaping Use    Vaping Use: Never used   Substance and Sexual Activity    Alcohol use: No    Drug use: Not Currently     Types: Marijuana Curlie Opal)    Sexual activity: Not Currently     Partners: Male   Other Topics Concern    Not on file   Social History Narrative    Not on file     Social Determinants of Health     Financial Resource Strain:     Difficulty of Paying Living Expenses: Not on file   Food Insecurity:     Worried About Running Out of Food in the Last Year: Not on file    Kendy of Food in the Last Year: Not on file   Transportation Needs:     Lack of Transportation (Medical): Not on file    Lack of Transportation (Non-Medical):  Not on file   Physical Activity:     Days of Exercise per Week: Not on file    Minutes of Exercise per Session: Not on file   Stress:     Feeling of Stress : Not on file   Social Connections:     Frequency of Communication with Friends and Family: Not on file    Frequency of Social Gatherings with Friends and Family: Not on file    Attends Church Services: Not on file    Active Member of Clubs or Organizations: Not on file    Attends Club or Organization Meetings: Not on file    Marital Status: Not on file   Intimate Partner Violence:     Fear of Current or Ex-Partner: Not on file    Emotionally Abused: Not on file    Physically Abused: Not on file    Sexually Abused: Not on file   Housing Stability:     Unable to Pay for Housing in the Last Year: Not on file    Number of Places Lived in the Last Year: Not on file    Unstable Housing in the Last Year: Not on file         Review of Systems:  History obtained from unobtainable from patient due to mental status          Objective:  Patient Vitals for the past 24 hrs:   BP Temp Temp src Pulse Resp SpO2 Weight   05/05/22 1100 (!) 138/57   74 14 100 %    05/05/22 1004    75 14 99 %    05/05/22 1000 (!) 175/57   73 14 99 %    05/05/22 0900 (!) 187/60   71 14 100 %    05/05/22 0800 (!) 176/64 96.9 °F (36.1 °C) Temporal 72 14 100 %    05/05/22 0700 (!) 165/60   76 14 100 %    05/05/22 0600 (!) 189/62   77 14 100 % 132 lb 0.9 oz (59.9 kg)   05/05/22 0557    78 13 100 %    05/05/22 0500 (!) 137/57   68 14 98 %    05/05/22 0400 (!) 143/65 96.9 °F (36.1 °C) Temporal 72 14 99 %    05/05/22 0300 (!) 144/58   69 14 100 %    05/05/22 0200 127/60   69 14 100 %    05/05/22 0157    66 14 99 %    05/05/22 0100 (!) 158/65   73 14 98 %    05/05/22 0000 133/63 96.8 °F (36 °C) Temporal 75 14 98 %    05/04/22 2300 (!) 140/57   78 14 98 %    05/04/22 2200 (!) 176/49   77 14 98 %    05/04/22 2154    74 14 98 %    05/04/22 2100 124/60   78 14 98 %    05/04/22 2000 134/60 97.2 °F (36.2 °C) Temporal 78 14 98 %    05/04/22 1900 (!) 129/56   78 14 99 %    05/04/22 1830 (!) 138/59   76 14 100 %    05/04/22 1820 (!) 161/50   75 14     05/04/22 1815    78 14 100 %    05/04/22 1800 (!) 157/60 97.9 °F (36.6 °C) Temporal 77 14 100 %    05/04/22 1745 (!) 140/64   76 14 100 %    05/04/22 1730 (!) 159/59   78 14 100 %    05/04/22 1715 (!) 161/62   78 14 100 %    05/04/22 1700 (!) 157/55   78 14 100 %    05/04/22 1630 (!) 157/63   79 14 99 %    05/04/22 1600 (!) 154/60   78 14 100 %    05/04/22 1530    80 14 100 %    05/04/22 1500 (!) 146/59   80 14 100 %    05/04/22 1430 (!) 148/95   74 17 100 %    05/04/22 1409    79 14 99 %    05/04/22 1400 (!) 141/61   78 14 99 %    05/04/22 1330 (!) 129/58   78 14 100 %        Intake/Output Summary (Last 24 hours) at 5/5/2022 1305  Last data filed at 5/5/2022 1000  Gross per 24 hour   Intake 1531.45 ml   Output 120 ml   Net 1411.45 ml     General: Ventilated and unable to participate in the history and physical examination  Chest:  clear to auscultation bilaterally  CVS: regular rate and rhythm  Abdominal: soft, nontender, normal bowel sounds  Extremities: no cyanosis or edema  Skin: warm and dry without rash    Labs:  BMP:   Recent Labs     05/03/22 0215 05/03/22  1831 05/04/22  0150 05/04/22  0404 05/05/22 0135     --  133*  --  136   K 3.8   < > 4.4 4.1 4.2   CL 92*  --  89*  --  91*   CO2 28  --  26  --  28   PHOS 3.2  --  5.0*  --  4.9*   BUN 28*  --  51*  --  43*   CREATININE 2.8*  --  3.9*  --  2.9*   CALCIUM 8.8  --  9.4  --  9.1    < > = values in this interval not displayed. CBC:   Recent Labs     05/03/22 0215 05/04/22 0150 05/05/22 0135   WBC 5.8 8.9 7.5   HGB 8.8* 9.8* 9.9*   HCT 28.4* 32.2* 31.7*   MCV 92.5 93.6 91.9    202 266     LIVER PROFILE:   Recent Labs     05/03/22 0215 05/04/22 0150 05/05/22 0135   AST 18 15 13   ALT 8 8 6   BILITOT 0.3 0.3 <0.2   ALKPHOS 107* 114* 100     PT/INR:   Recent Labs     05/03/22 0215 05/04/22 0150 05/05/22 0135   PROTIME 13.6 14.3 13.9   INR 1.04 1.11 1.08     APTT: No results for input(s): APTT in the last 72 hours. BNP:  No results for input(s): BNP in the last 72 hours. Ionized Calcium:No results for input(s): IONCA in the last 72 hours. Magnesium:  Recent Labs     05/03/22 0215 05/04/22 0150 05/05/22 0135   MG 2.3 2.7* 2.4     Phosphorus:  Recent Labs     05/03/22 0215 05/04/22 0150 05/05/22 0135   PHOS 3.2 5.0* 4.9*     HgbA1C:   No results for input(s): LABA1C in the last 72 hours.   Hepatic:   Recent Labs     05/03/22 0215 05/04/22 0150 05/05/22 0135   ALKPHOS 107* 114* 100   ALT 8 8 6   AST 18 15 13   PROT 6.0* 6.2* 5.9*   BILITOT 0.3 0.3 <0.2   LABALBU 3.1* 3.1* 2.9*     Lactic Acid: No results for input(s): LACTA in the last 72 hours. Troponin: No results for input(s): CKTOTAL, CKMB, TROPONINT in the last 72 hours. ABGs:   Lab Results   Component Value Date    PHART 7.540 05/04/2022    PO2ART 119.0 05/04/2022    AYP4TKI 36.0 05/04/2022     CRP:  No results for input(s): CRP in the last 72 hours. Sed Rate:  No results for input(s): SEDRATE in the last 72 hours. Culture Results:   Blood Culture Recent:   Recent Labs     04/24/22  1447   BC No growth after 5 days of incubation. Urine Culture Recent :   Recent Labs     04/24/22  1354   LABURIN No growth at 36-48 hours       Radiology reports as per the Radiologist  Radiology: CT Head WO Contrast    Result Date: 4/24/2022  CT HEAD WO CONTRAST 4/24/2022 2:01 PM HISTORY: Altered mental status COMPARISON: CT scan dated 11/18/2021 DOSE LENGTH PRODUCT: 766 mGy cm TECHNIQUE: Helical tomographic images of the brain were obtained without the use of intravenous contrast. Automated exposure control was also utilized to decrease patient radiation dose. FINDINGS: There is no evidence of evolving large vascular territory infarct. No visualized intra-axial or extra-axial hemorrhage. No mass lesion is identified. Normal size and configuration of the ventricular system. The basal cisterns are symmetric. Posterior fossa structures are unremarkable. The included orbits and their contents are unremarkable. Chronic paranasal sinus disease on the left. The visualized osseous structures and overlying soft tissues of the skull and face are unremarkable. 1. No acute intracranial process.  Signed by Dr Nu Gamez    Result Date: 4/24/2022  XR CHEST PORTABLE 4/24/2022 4:27 PM HISTORY: ET tube, enteric tube  Technique: Single AP view of the chest COMPARISONS: 4/24/2022 FINDINGS: Endotracheal tube is identified with tip essentially touching the krysta. Enteric tube courses into the stomach with tip curled in the fundus back towards the gastroesophageal junction. The lungs are clear and well expanded. Heart size is stable. Pulmonary vasculature are nondilated. No pleural effusion or pneumothorax. 1. Endotracheal tube is deep, tip essentially touching the krysta. Lungs are clear and well expanded. I would recommend 2-3 cm withdrawal for a more optimal positioning. The results of this exam were discussed with Peace Coello (ICU nursing) on 4/24/2022 at 1640 hours Signed by Dr Fannie Burton    XR CHEST PORTABLE    Result Date: 4/24/2022  XR CHEST PORTABLE 4/24/2022 1:17 PM HISTORY: ET tube placement  Technique: Single AP view of the chest COMPARISONS: 4/24/2022 FINDINGS: Status post endotracheal intubation. ET tube is in good position. Lungs are well-expanded. Enteric tube curls on itself in the midesophagus and then extends back up into the throat. Heart size is stable. Pulmonary vasculature are nondilated. 1. ET tube is in good position. Lungs are clear and well expanded. 2. Enteric tube curls on itself in the mid esophagus before extending back up into the throat. This will need repositioned. The results of this exam were discussed with Dr. JUÁREZ [facility] on 4/24/2022 at 1329 hours Signed by Dr Fannie Burton    XR CHEST PORTABLE    Result Date: 4/24/2022  XR CHEST PORTABLE 4/24/2022 12:34 PM HISTORY: Altered mental status  Technique: Single AP view of the chest COMPARISONS: Chest exam dated 4/1/2022 FINDINGS: No lung consolidation. No pleural effusion or pneumothorax. Cardiomediastinal silhouette and pulmonary vasculature are unremarkable. No acute bony abnormality. Left subclavian region vascular stent with additional stent projecting over the left humerus. No acute cardiopulmonary process.  Signed by Dr Fannie Burton    VL DUP CAROTID BILATERAL    Result Date: 4/29/2022  Vascular Carotid Procedure  Demographics   Patient Name !            !               ! +------------+-------+-------+--------+-------+------------+---------------+ ! Prox ICA    !41     !7.46   !60      !0.82   !            !               ! +------------+-------+-------+--------+-------+------------+---------------+ ! Mid ICA     !62.7   !11.2   !60      !0.82   !            !               ! +------------+-------+-------+--------+-------+------------+---------------+ ! Dist ICA    !75.2   !13.7   !60      !0.82   !            !               ! +------------+-------+-------+--------+-------+------------+---------------+ ! Prox ECA    !147    !       !61      !       !            !               ! +------------+-------+-------+--------+-------+------------+---------------+ ! Vertebral   !47.2   !13.7   !60      !0.71   !            !               ! +------------+-------+-------+--------+-------+------------+---------------+   - There is antegrade vertebral flow noted on the right side. - Additional Measurements:ICAPSV/CCAPSV 1.59. Carotid Left Measurements +------------+-------+-------+--------+-------+------------+---------------+ ! Location    ! PSV    ! EDV    ! Angle   ! RI     !%Stenosis   ! Tortuosity     ! +------------+-------+-------+--------+-------+------------+---------------+ ! Prox CCA    !81.4   !9.94   !60      !0.88   !            !               ! +------------+-------+-------+--------+-------+------------+---------------+ ! Mid CCA     !70.2   !7.46   !60      !0.89   !            !               ! +------------+-------+-------+--------+-------+------------+---------------+ ! Prox ICA    !69.4   !11.9   !60      !0.83   !            !               ! +------------+-------+-------+--------+-------+------------+---------------+ ! Mid ICA     !80.6   !19.6   !60      !0.76   !            !               ! +------------+-------+-------+--------+-------+------------+---------------+ ! Dist ICA    !70.3   !12.5   !50      !0.82   !            !               ! +------------+-------+-------+--------+-------+------------+---------------+ ! Prox ECA    !113    !       !61      !       !            !               ! +------------+-------+-------+--------+-------+------------+---------------+ ! Vertebral   !38.8   !       !60      !       !            !               ! +------------+-------+-------+--------+-------+------------+---------------+   - There is antegrade vertebral flow noted on the left side. - Additional Measurements:ICAPSV/CCAPSV 0.99. ICAEDV/CCAEDV 1.97. MRI BRAIN WO CONTRAST    Result Date: 4/29/2022  Examination. MRI BRAIN WO CONTRAST 4/29/2022 11:43 AM History: Seizure activity. The multiplanar, multisequence MR imaging of the brain is performed without contrast enhancement. There is no previous similar study for comparison. The correlation made with CT scan of the head dated 4/24/2022. The diffusion-weighted imaging sequence including the ADC map show an area of acute infarction involving the left posterior frontal, parietal and basal occipital lobe, the left middle cerebral artery territory. A small linear focus of T2 signal in the right posterior paraventricular white matter has no corresponding ADC map changes and may represent a subacute or chronic process. The targeted images of the mesial temporal lobe show moderate symmetrical mesial temporal atrophy without presence of any abnormal signal. This may be a process of general volume loss/atrophy. Moderately dilated ventricles, basal cisterns and the cortical sulci suggestive chronic volume loss/atrophy. The orbits are suboptimally evaluated. No obvious abnormality. Limited visualized optic nerve, optic chiasma and optic tracts are normal and symmetrical. The pituitary gland appears normal. The corpus callosum, the brainstem and cerebellum are normal. The FLAIR sequence images show significant periventricular T2 signal changes representing chronic white matter ischemia and transependymal CSF flow.  The gradient recalled imaging sequence show normal flow void in the visualized intracranial vessels, particularly normal flow in the limited visualized left middle cerebral artery. There is mucosal thickening involving the ethmoid sinuses. The bilateral mastoid effusion. 1. Acute left middle cerebral territory infarct. 2. Chronic ischemic and atrophic changes. 3. The symmetrical mesial temporal atrophy is probably a part of the generalized volume loss. 4. Ethmoid sinusitis. Bilateral mastoid effusion. The above report was immediately called to the nurse in charge of the patient in CCU. Signed by Dr Lima Todd    ECHO 2D [facility] Meals    Result Date: 4/30/2022  Transthoracic Echocardiography Report (TTE)  Demographics   Patient Name  [facility] Date of Study          04/29/2022   MRN           [id number]       Gender                 Female   Date of Birth 1970   Room Number            MHL-0702   Age           47 year(s)   Height:       66 inches    Referring Physician    Anni Ty   Weight:       144 pounds   Sonographer            Jessie Madera, [facility]   BSA:          1.74 m^2     Interpreting Physician Melinda Bates MD   BMI:          23.24 kg/m^2  Procedure Type of Study   TTE procedure:ECHO NO CONTRAST WITH DOP/COLR. Study Location: Portable Technical Quality: Adequate visualization Patient Status: Inpatient Contrast Medium: Bubble Study. Rhythm: Within normal limits HR: 57 bpm BP: 178/69 mmHg Indications:Stroke. Conclusions   Summary  Mitral valve leaflets are mildly thickened with preserved leaflet  mobility. Mild aortic stenosis. Mean gradient 14 mm hg  Tricuspid valve is structurally normal.  Mild tricuspid regurgitation. Moderately dilated left atrium. Normal intact intra-atrial septum was noted with no evidence of  significant intra-atrial communications by color flow Doppler or by  agitated saline study.   Normal left ventricular size with preserved LV function and an estimated ejection fraction of approximately 55-60%. Moderate concentric left ventricular hypertrophy. No regional wall motion abnormalities. Grade II diastolic dysfunction   Signature   ----------------------------------------------------------------  Electronically signed by Elliot Montalvo MD(Interpreting  physician) on 04/30/2022 11:02 AM  ----------------------------------------------------------------   Findings   Mitral Valve  Mitral valve leaflets are mildly thickened with preserved leaflet  mobility. Aortic Valve  Mild aortic stenosis. Mean gradient 14 mm hg   Tricuspid Valve  Tricuspid valve is structurally normal.  Mild tricuspid regurgitation. Pulmonic Valve  The pulmonic valve was not well visualized . Left Atrium  Moderately dilated left atrium. Normal intact intra-atrial septum was noted with no evidence of  significant intra-atrial communications by color flow Doppler or by  agitated saline study. Left Ventricle  Normal left ventricular size with preserved LV function and an estimated  ejection fraction of approximately 55-60%. Moderate concentric left ventricular hypertrophy. No regional wall motion abnormalities. Grade II diastolic dysfunction   Right Atrium  Normal right atrial dimension with no evidence of thrombus or mass noted. Right Ventricle  Normal right ventricular size with preserved RV function. Pericardial Effusion  No evidence of significant pericardial effusion is noted. Pleural Effusion  No evidence of pleural effusion. Allergies   - Penicillins.   - Stadol.   - Other allergy:(Lamisil Advanced). 2D Measurements and Calculations(cm)   LVIDd: 4.4 cm                       LVIDs: 3.14 cm  IVSd: 1.41 cm  LVPWd: 1.43 cm                      AO Root Dimension: 2.4 cm  Rt. Vent.  Dimension: 2.64 cm        LA Dimension: 3.8 cm  % Ejection Fraction: 55 %           LA Area: 24.3 cm^2  LA Volume: 86.1 ml                  LV Systolic dimension: 8.27BW  LA Volume Index: 49 ml/m^2 LV PW diastolic: 8.82KN  LV dimension: 4.4 cm                LVOT diameter: 2 cm                                      RV Diastolic dimension: 8.53FU  LVEDV: 115 ml                       LVEDVI: 66 ml/m^2  LVESV:36.3 ml                       LVESVI: 21 ml/m^2  Cardic Output (CO): 5.53l/min  Doppler Measurements and Calculations   AV Peak Velocity:257 cm/s              MV Peak E-Wave: 82.3 cm/s  AV Mean Velocity:178 cm/s              MV Peak A-Wave: 76.7 cm/s  AV Peak Gradient: 26.42 mmHg           MV E/A Ratio: 1.07 %  AV Mean Gradient: 14 mmHg              MV Mean velocity: 64.8cm/s  AV Area (Continuity):1.85 cm^2         MV Peak Gradient: 2.71 mmHg  AV VTI:52.4 cm/s                       MV Mean gradient: 2 mmHg  TR Velocity:207 cm/s  TR Gradient:17.14 mmHg   MV E' septal velocity: 5.66cm/s  MV E' lateral velocity:5.66 cm/s         Assessment   ESRD  DM2 with nephropathy on long-term insulin, uncontrolled  HTN  Hyponatremia  Hypokalemia  Secondary Hyperparathyroidism  Anemia CKD  Seizure disorder  Hypothyroidism       Plan:  Dialysis per routine scheduling   Ultrafiltration as tolerated  Wean ventilator per primary  Monitor labs  Neuro evaluation reviewed  Work-up reviewed to date, plans for PEG and tracheostomy noted    Robert Adan MD  05/05/22  1:05 PM

## 2022-05-06 LAB
ALBUMIN SERPL-MCNC: 3 G/DL (ref 3.5–5.2)
ALP BLD-CCNC: 96 U/L (ref 35–104)
ALT SERPL-CCNC: 6 U/L (ref 5–33)
ANION GAP SERPL CALCULATED.3IONS-SCNC: 18 MMOL/L (ref 7–19)
AST SERPL-CCNC: 13 U/L (ref 5–32)
BASOPHILS ABSOLUTE: 0 K/UL (ref 0–0.2)
BASOPHILS RELATIVE PERCENT: 0.1 % (ref 0–1)
BILIRUB SERPL-MCNC: 0.3 MG/DL (ref 0.2–1.2)
BUN BLDV-MCNC: 65 MG/DL (ref 6–20)
CALCIUM SERPL-MCNC: 9 MG/DL (ref 8.6–10)
CHLORIDE BLD-SCNC: 91 MMOL/L (ref 98–111)
CO2: 27 MMOL/L (ref 22–29)
CREAT SERPL-MCNC: 3.6 MG/DL (ref 0.5–0.9)
EOSINOPHILS ABSOLUTE: 0 K/UL (ref 0–0.6)
EOSINOPHILS RELATIVE PERCENT: 0.1 % (ref 0–5)
GFR AFRICAN AMERICAN: 16
GFR NON-AFRICAN AMERICAN: 13
GLUCOSE BLD-MCNC: 124 MG/DL (ref 70–99)
GLUCOSE BLD-MCNC: 128 MG/DL (ref 70–99)
GLUCOSE BLD-MCNC: 130 MG/DL (ref 70–99)
GLUCOSE BLD-MCNC: 144 MG/DL (ref 74–109)
GLUCOSE BLD-MCNC: 194 MG/DL (ref 70–99)
HCT VFR BLD CALC: 33.9 % (ref 37–47)
HEMOGLOBIN: 10.3 G/DL (ref 12–16)
IMMATURE GRANULOCYTES #: 0 K/UL
INR BLD: 1.04 (ref 0.88–1.18)
LYMPHOCYTES ABSOLUTE: 0.9 K/UL (ref 1.1–4.5)
LYMPHOCYTES RELATIVE PERCENT: 10.7 % (ref 20–40)
MAGNESIUM: 2.6 MG/DL (ref 1.6–2.6)
MCH RBC QN AUTO: 28.4 PG (ref 27–31)
MCHC RBC AUTO-ENTMCNC: 30.4 G/DL (ref 33–37)
MCV RBC AUTO: 93.4 FL (ref 81–99)
MONOCYTES ABSOLUTE: 0.5 K/UL (ref 0–0.9)
MONOCYTES RELATIVE PERCENT: 5.4 % (ref 0–10)
NEUTROPHILS ABSOLUTE: 7.3 K/UL (ref 1.5–7.5)
NEUTROPHILS RELATIVE PERCENT: 83.4 % (ref 50–65)
PDW BLD-RTO: 18.6 % (ref 11.5–14.5)
PERFORMED ON: ABNORMAL
PHOSPHORUS: 6 MG/DL (ref 2.5–4.5)
PLATELET # BLD: 237 K/UL (ref 130–400)
PMV BLD AUTO: 9.7 FL (ref 9.4–12.3)
POTASSIUM SERPL-SCNC: 4.8 MMOL/L (ref 3.5–5)
PROTHROMBIN TIME: 13.5 SEC (ref 12–14.6)
RBC # BLD: 3.63 M/UL (ref 4.2–5.4)
SODIUM BLD-SCNC: 136 MMOL/L (ref 136–145)
TOTAL PROTEIN: 6 G/DL (ref 6.6–8.7)
WBC # BLD: 8.7 K/UL (ref 4.8–10.8)

## 2022-05-06 PROCEDURE — 85025 COMPLETE CBC W/AUTO DIFF WBC: CPT

## 2022-05-06 PROCEDURE — 6360000002 HC RX W HCPCS: Performed by: INTERNAL MEDICINE

## 2022-05-06 PROCEDURE — 2100000000 HC CCU R&B

## 2022-05-06 PROCEDURE — 2580000003 HC RX 258: Performed by: INTERNAL MEDICINE

## 2022-05-06 PROCEDURE — 2700000000 HC OXYGEN THERAPY PER DAY

## 2022-05-06 PROCEDURE — 87186 SC STD MICRODIL/AGAR DIL: CPT

## 2022-05-06 PROCEDURE — 82947 ASSAY GLUCOSE BLOOD QUANT: CPT

## 2022-05-06 PROCEDURE — 6370000000 HC RX 637 (ALT 250 FOR IP): Performed by: INTERNAL MEDICINE

## 2022-05-06 PROCEDURE — 80053 COMPREHEN METABOLIC PANEL: CPT

## 2022-05-06 PROCEDURE — 2500000003 HC RX 250 WO HCPCS: Performed by: INTERNAL MEDICINE

## 2022-05-06 PROCEDURE — 84100 ASSAY OF PHOSPHORUS: CPT

## 2022-05-06 PROCEDURE — 99233 SBSQ HOSP IP/OBS HIGH 50: CPT | Performed by: PSYCHIATRY & NEUROLOGY

## 2022-05-06 PROCEDURE — 83735 ASSAY OF MAGNESIUM: CPT

## 2022-05-06 PROCEDURE — 87070 CULTURE OTHR SPECIMN AEROBIC: CPT

## 2022-05-06 PROCEDURE — 87205 SMEAR GRAM STAIN: CPT

## 2022-05-06 PROCEDURE — 94640 AIRWAY INHALATION TREATMENT: CPT

## 2022-05-06 PROCEDURE — 85610 PROTHROMBIN TIME: CPT

## 2022-05-06 PROCEDURE — 94003 VENT MGMT INPAT SUBQ DAY: CPT

## 2022-05-06 PROCEDURE — 99232 SBSQ HOSP IP/OBS MODERATE 35: CPT

## 2022-05-06 PROCEDURE — 36415 COLL VENOUS BLD VENIPUNCTURE: CPT

## 2022-05-06 PROCEDURE — 8010000000 HC HEMODIALYSIS ACUTE INPT

## 2022-05-06 RX ORDER — IPRATROPIUM BROMIDE AND ALBUTEROL SULFATE 2.5; .5 MG/3ML; MG/3ML
1 SOLUTION RESPIRATORY (INHALATION) EVERY 4 HOURS
Status: DISCONTINUED | OUTPATIENT
Start: 2022-05-06 | End: 2022-05-25 | Stop reason: HOSPADM

## 2022-05-06 RX ORDER — ACETYLCYSTEINE 200 MG/ML
600 SOLUTION ORAL; RESPIRATORY (INHALATION) 2 TIMES DAILY
Status: DISCONTINUED | OUTPATIENT
Start: 2022-05-06 | End: 2022-05-25 | Stop reason: HOSPADM

## 2022-05-06 RX ADMIN — ACETYLCYSTEINE 600 MG: 200 SOLUTION ORAL; RESPIRATORY (INHALATION) at 18:08

## 2022-05-06 RX ADMIN — VANCOMYCIN HYDROCHLORIDE 125 MG: 1 INJECTION, POWDER, LYOPHILIZED, FOR SOLUTION INTRAVENOUS at 00:14

## 2022-05-06 RX ADMIN — PROPOFOL 40 MCG/KG/MIN: 10 INJECTION, EMULSION INTRAVENOUS at 04:32

## 2022-05-06 RX ADMIN — IPRATROPIUM BROMIDE AND ALBUTEROL SULFATE 1 AMPULE: 2.5; .5 SOLUTION RESPIRATORY (INHALATION) at 18:08

## 2022-05-06 RX ADMIN — INSULIN LISPRO 1 UNITS: 100 INJECTION, SOLUTION INTRAVENOUS; SUBCUTANEOUS at 21:29

## 2022-05-06 RX ADMIN — VANCOMYCIN HYDROCHLORIDE 125 MG: 1 INJECTION, POWDER, LYOPHILIZED, FOR SOLUTION INTRAVENOUS at 18:34

## 2022-05-06 RX ADMIN — METHYLPREDNISOLONE SODIUM SUCCINATE 40 MG: 40 INJECTION, POWDER, FOR SOLUTION INTRAMUSCULAR; INTRAVENOUS at 15:52

## 2022-05-06 RX ADMIN — Medication 10 ML: at 08:17

## 2022-05-06 RX ADMIN — VANCOMYCIN HYDROCHLORIDE 125 MG: 1 INJECTION, POWDER, LYOPHILIZED, FOR SOLUTION INTRAVENOUS at 11:41

## 2022-05-06 RX ADMIN — ASPIRIN 81 MG 81 MG: 81 TABLET ORAL at 08:13

## 2022-05-06 RX ADMIN — METHYLPREDNISOLONE SODIUM SUCCINATE 40 MG: 40 INJECTION, POWDER, FOR SOLUTION INTRAMUSCULAR; INTRAVENOUS at 21:18

## 2022-05-06 RX ADMIN — LEVETIRACETAM 500 MG: 5 INJECTION INTRAVENOUS at 01:08

## 2022-05-06 RX ADMIN — ACETYLCYSTEINE 600 MG: 200 SOLUTION ORAL; RESPIRATORY (INHALATION) at 14:20

## 2022-05-06 RX ADMIN — IPRATROPIUM BROMIDE AND ALBUTEROL SULFATE 1 AMPULE: 2.5; .5 SOLUTION RESPIRATORY (INHALATION) at 14:20

## 2022-05-06 RX ADMIN — CHLORHEXIDINE GLUCONATE 15 ML: 1.2 RINSE ORAL at 08:16

## 2022-05-06 RX ADMIN — HYDRALAZINE HYDROCHLORIDE 100 MG: 50 TABLET, FILM COATED ORAL at 10:11

## 2022-05-06 RX ADMIN — HYDRALAZINE HYDROCHLORIDE 10 MG: 20 INJECTION INTRAMUSCULAR; INTRAVENOUS at 09:40

## 2022-05-06 RX ADMIN — HEPARIN SODIUM 5000 UNITS: 5000 INJECTION INTRAVENOUS; SUBCUTANEOUS at 08:13

## 2022-05-06 RX ADMIN — CHLORHEXIDINE GLUCONATE 15 ML: 1.2 RINSE ORAL at 21:24

## 2022-05-06 RX ADMIN — LEVETIRACETAM 500 MG: 5 INJECTION INTRAVENOUS at 13:07

## 2022-05-06 RX ADMIN — PROPOFOL 50 MCG/KG/MIN: 10 INJECTION, EMULSION INTRAVENOUS at 21:10

## 2022-05-06 RX ADMIN — VANCOMYCIN HYDROCHLORIDE 125 MG: 1 INJECTION, POWDER, LYOPHILIZED, FOR SOLUTION INTRAVENOUS at 06:08

## 2022-05-06 RX ADMIN — INSULIN GLARGINE 20 UNITS: 100 INJECTION, SOLUTION SUBCUTANEOUS at 08:14

## 2022-05-06 RX ADMIN — PROPOFOL 50 MCG/KG/MIN: 10 INJECTION, EMULSION INTRAVENOUS at 14:19

## 2022-05-06 RX ADMIN — METHYLPREDNISOLONE SODIUM SUCCINATE 40 MG: 40 INJECTION, POWDER, FOR SOLUTION INTRAMUSCULAR; INTRAVENOUS at 06:08

## 2022-05-06 RX ADMIN — HEPARIN SODIUM 5000 UNITS: 5000 INJECTION INTRAVENOUS; SUBCUTANEOUS at 21:18

## 2022-05-06 RX ADMIN — PROPOFOL 50 MCG/KG/MIN: 10 INJECTION, EMULSION INTRAVENOUS at 10:17

## 2022-05-06 RX ADMIN — IPRATROPIUM BROMIDE AND ALBUTEROL SULFATE 1 AMPULE: 2.5; .5 SOLUTION RESPIRATORY (INHALATION) at 21:39

## 2022-05-06 RX ADMIN — INSULIN GLARGINE 20 UNITS: 100 INJECTION, SOLUTION SUBCUTANEOUS at 21:29

## 2022-05-06 RX ADMIN — ANORECTAL OINTMENT: 15.7; .44; 24; 20.6 OINTMENT TOPICAL at 18:34

## 2022-05-06 RX ADMIN — SODIUM CHLORIDE, PRESERVATIVE FREE 20 MG: 5 INJECTION INTRAVENOUS at 08:13

## 2022-05-06 RX ADMIN — HEPARIN SODIUM 5000 UNITS: 5000 INJECTION INTRAVENOUS; SUBCUTANEOUS at 15:52

## 2022-05-06 ASSESSMENT — PAIN SCALES - GENERAL
PAINLEVEL_OUTOF10: 0

## 2022-05-06 ASSESSMENT — PULMONARY FUNCTION TESTS
PIF_VALUE: 17
PIF_VALUE: 30
PIF_VALUE: 43
PIF_VALUE: 17
PIF_VALUE: 17
PIF_VALUE: 18
PIF_VALUE: 18
PIF_VALUE: 32
PIF_VALUE: 26
PIF_VALUE: 35
PIF_VALUE: 18
PIF_VALUE: 17
PIF_VALUE: 38
PIF_VALUE: 30
PIF_VALUE: 18
PIF_VALUE: 20
PIF_VALUE: 17
PIF_VALUE: 26
PIF_VALUE: 18
PIF_VALUE: 18
PIF_VALUE: 26
PIF_VALUE: 18
PIF_VALUE: 26
PIF_VALUE: 17
PIF_VALUE: 18

## 2022-05-06 NOTE — PROGRESS NOTES
Palliative Care Progress Note  5/6/2022 8:35 AM    Patient:  Kaylan Eubanks  YOB: 1970  Primary Care Physician: ROMINA Gutierrez  Advance Directive: Full Code  Admit Date: 4/24/2022       Hospital Day: 12  Portions of this note have been copied forward, however, changed to reflect the most current clinical status of this patient. CHIEF COMPLAINT/REASON FOR CONSULTATION Goals of care    SUBJECTIVE:  Ms. Germain Dorsey remains intubated/sedated on minimal vent settings. Review of Systems:   14 point review of systems is negative except as specifically addressed above. Objective:   VITALS:  BP (!) 174/62   Pulse 76   Temp 97.2 °F (36.2 °C) (Temporal)   Resp 14   Ht 5' 6\" (1.676 m)   Wt 138 lb 7.2 oz (62.8 kg)   SpO2 94%   BMI 22.35 kg/m²   24HR INTAKE/OUTPUT:      Intake/Output Summary (Last 24 hours) at 5/6/2022 0835  Last data filed at 5/6/2022 0500  Gross per 24 hour   Intake 1759.54 ml   Output 10 ml   Net 1749.54 ml     General appearance: 48 yo female, chronically ill appearing, intubated/sedated, NAD  Head: Normocephalic, without obvious abnormality, atraumatic  Eyes: conjunctivae/corneas clear. PERRL  Ears: normal external ears and nose  Neck: supple, symmetrical, trachea midline   Lungs: diminished in lower lobes bilaterally to asucultation, mechanically ventilated, equal chest rise  Heart: regular rate and rhythm, S1, S2 normal, no murmur  Abdomen: soft, no grimacing w/ palpation; non-distended, bowel sounds present .  FMS in place with diarrhea noted   Extremities: trace edema in BUE, No lower extremity edema,  No erythema, no grimacing w/ palpation   Skin: warm, dry  Neurologic: Intubated/sedated, does not respond to voice, withdrawals to pain    Medications:      propofol 40 mcg/kg/min (05/06/22 0500)    dexmedetomidine HCl in NaCl Stopped (05/03/22 1143)    dextrose      sodium chloride Stopped (05/03/22 2005)      insulin glargine  20 Units SubCUTAneous BID    aspirin  81 mg Oral Daily    methylPREDNISolone  40 mg IntraVENous Q8H    heparin (porcine)  5,000 Units SubCUTAneous TID    menthol-zinc oxide   Topical BID    insulin lispro  0-12 Units SubCUTAneous TID WC    insulin lispro  0-6 Units SubCUTAneous Nightly    vancomycin  125 mg Oral 4 times per day    hydrALAZINE  100 mg Oral 3 times per day    sodium chloride flush  5-40 mL IntraVENous 2 times per day    levetiracetam  500 mg IntraVENous Q12H    chlorhexidine  15 mL Mouth/Throat BID    famotidine (PEPCID) injection  20 mg IntraVENous Daily     hydrALAZINE, racepinephrine HCl, sodium chloride nebulizer, Peppermint Spirit, sodium phosphate IVPB **OR** sodium phosphate IVPB, glucagon (rDNA), dextrose, glucose, dextrose bolus **OR** dextrose bolus, sodium chloride flush, sodium chloride, LORazepam, ondansetron **OR** ondansetron, polyethylene glycol, acetaminophen **OR** acetaminophen, magic butt cream, magnesium sulfate, potassium alternative oral replacement **OR** potassium chloride  Diet NPO  ADULT TUBE FEEDING; Orogastric; Peptide Based High Protein; Continuous; 47; No; 0; Other (specify); no water flush     Lab and other Data:     Recent Labs     05/04/22  0150 05/05/22 0135 05/06/22  0239   WBC 8.9 7.5 8.7   HGB 9.8* 9.9* 10.3*    266 237     Recent Labs     05/03/22  1831 05/04/22  0150 05/04/22  0404 05/05/22  0135 05/06/22  0239   NA  --  133*  --  136 136   K   < > 4.4 4.1 4.2 4.8   CL  --  89*  --  91* 91*   CO2  --  26  --  28 27   BUN  --  51*  --  43* 65*   CREATININE  --  3.9*  --  2.9* 3.6*   GLUCOSE  --  360*  --  252* 144*    < > = values in this interval not displayed.      Recent Labs     05/04/22  0150 05/05/22 0135 05/06/22  0239   AST 15 13 13   ALT 8 6 6   BILITOT 0.3 <0.2 0.3   ALKPHOS 114* 100 96       Assessment/Plan   Principal Problem:    Acute ischemic stroke (HCC)  Active Problems:    Weakness    Severe malnutrition (HCC)    Gastroesophageal reflux disease without esophagitis Acquired hypothyroidism    Anemia in chronic kidney disease (CKD)    Type 2 diabetes mellitus with chronic kidney disease on chronic dialysis, with long-term current use of insulin (HCC)    Acute encephalopathy    Respiratory failure ([facility] [address]    Uncontrolled type 2 diabetes mellitus with complication, with long-term current use of insulin (HCC)    ESRD (end stage renal disease) on dialysis ([facility] [address]    PVD (peripheral vascular disease) ([facility] [address]    ESRD on hemodialysis ([facility] [address]    Hyponatremia    Hyperglycemia due to type 2 diabetes mellitus ([facility] [address]    Uncontrolled hypertension    Seizure ([facility] [address]    Palliative care patient    Uncontrolled type 2 diabetes mellitus with hyperosmolar nonketotic hyperglycemia (HCC)    Altered mental state    Acute hypoxemic respiratory failure (HCC)    Generalized weakness    UTI (urinary tract infection)    Closed fracture of left distal femur (HCC)    History of infection with vancomycin resistant Enterococcus (VRE)  Resolved Problems:    * No resolved hospital problems. *      Visit Summary:  Chart reviewed, patient discussed with nursing staff. Reviewed health issues, work up and treatment plan as well as factors that lead to hospitalization. Report obtained from RN with no acute events overnight. Ms. Ericka Mcbride is seen at bedside this morning without significant change. She remains intubated and sedated. Dialysis continues per regular schedule. GS consulted for PEG/trach as her legal decision maker/significant other, Gloria Oglesby, wishes to continue all aggressive measures. Multiple conversations have been had with Gloria Oglesby regarding pts prognosis and current status. I have also offered him bedside visits multiple times but he has not been to the hospital to see pt as he does not have a drivers license and reports his car is not working. Plan has remained unchanged and he has stated that pt told him, \"as long as I have brain function I want everything done. \" I attempted to call him this afternoon to update on pt status and ask him to come to the hospital for consents to be signed for procedure but he did not answer. VM left with contact information to call back. Discussed pt with SW. Will follow. Recommendations:   1. Palliative care: GOC continue aggressive course and monitor for improvement. Significant other would like to pursue trach/PEG. Agreeable to [facility] referral following procedure. Code status: FULL CODE    2. Acute hypoxemic respiratory failure- Extubated 5/3/22 but required reintubation that afternoon. Remains on minimal vent settings FiO2 35% and PEEP 5. Original intubation date 4/24/22    3. Acute left MCA infarct- mgmt per neuro. MRI 4/29/22 noted. Echo negative 4/30/22. On ASA. 4. Hyperglycemia w/ Hx of DM II-mgmt per Hospitalist, SSI    5. S/p tonic-clonic seizure-mgmt per Neurology, cEEG discontinued, Keppra 500 mg BID    6. Concern for UTI-Urine cx no growth     7. ESRD on HD-Nephrology managing. Dialysis today    8. Hypertension-Hydralazine 100 mg TID, cardene gtt as needed    9. Hyponatremia-improving    10. Clostridium difficile/Norovirus-Oral vancomycin     Thank you for consulting Palliative Care and allowing us to participate in the care of this patient.    Time Spent Counseling > 50%:  YES                                   Total Time Spent with patient/family counseling, workup/treatment review, counseling and placement of orders/preparation of this note: 27 minutes    Electronically signed by ROMINA Jackson CNP on 5/6/2022 at 8:35 AM    (Please note that portions of this note were completed with a voice recognition program.  Jono Abraham made to edit the dictations but occasionally words are mis-transcribed.)

## 2022-05-06 NOTE — PROGRESS NOTES
Comprehensive Nutrition Assessment    Type and Reason for Visit:  Reassess    Nutrition Recommendations/Plan:   1. Continue to monitor tolerance, residuals, Propofol rate     Malnutrition Assessment:  Malnutrition Status:  Severe malnutrition (05/06/22 1229)    Context:  Acute Illness     Findings of the 6 clinical characteristics of malnutrition:  Energy Intake:  Mild decrease in energy intake (Comment)  Weight Loss:  Greater than 2% over 1 week     Body Fat Loss: Moderate body fat loss Orbital,Buccal region   Muscle Mass Loss: Moderate muscle mass loss Thigh (quadraceps),Temples (temporalis)  Fluid Accumulation:  No significant fluid accumulation Extremities   Strength:  Not Performed    Nutrition Assessment:    Propofol has increased to 18ml/hr. TF remains off--had increased residues per nursing notes. Documented residuals 20-160ml. RN to restart tf slowly -- 5 or 10ml/hr and advance slwoly. Aware of possible PEG/trach next week    Nutrition Related Findings:    intubated. Propofol Wound Type: Deep Tissue Injury       Current Nutrition Intake & Therapies:    Average Meal Intake: NPO  Average Supplements Intake: NPO  Current Tube Feeding (TF) Orders:  · Feeding Route: Orogastric  · Formula: Peptide Based High Protein  · Schedule: Continuous  · Feeding Regimen: Vital High Protein @ 47ml/hr  · Additives/Modulars: None  · Water Flushes: none  · Current TF & Flush Orders Provides: 0  · Goal TF & Flush Orders Provides: Vital High Protein @ 47ml/hr = 1128 kcals with 98g protein, 123g CHO and 945ml free water from formula. Propofol adds another 475 NP kcals      Anthropometric Measures:  Height: 5' 6\" (167.6 cm)  Ideal Body Weight (IBW): 130 lbs (59 kg)    Admission Body Weight: 165 lb (74.8 kg) (estimated)  Current Body Weight: 132 lb 7.2 oz (60.1 kg), 101.9 % IBW.  Weight Source: Bed Scale  Current BMI (kg/m2): 21.4  Usual Body Weight: 144 lb (65.3 kg) (1/2022)  % Weight Change (Calculated): 0.5  BMI Categories: Normal Weight (BMI 18.5-24. 9)    Estimated Daily Nutrient Needs:  Energy Requirements Based On: Kcal/kg  Weight Used for Energy Requirements: Current  Energy (kcal/day): 6652-2026 kcals (20-25 kcals/kg)  Weight Used for Protein Requirements: Current  Protein (g/day): 90g  Method Used for Fluid Requirements: 1 ml/kcal  Fluid (ml/day): 2438-1097 ml    Nutrition Diagnosis:   · Inadequate oral intake related to acute injury/trauma,impaired respiratory function as evidenced by NPO or clear liquid status due to medical condition,intubation,nutrition support - enteral nutrition      Nutrition Interventions:   Food and/or Nutrient Delivery: Continue NPO,Continue Current Tube Feeding  Nutrition Education/Counseling: Education not indicated  Coordination of Nutrition Care: Continue to monitor while inpatient  Plan of Care discussed with: nursing    Goals:  Previous Goal Met: Progressing toward Goal(s)  Goals: Meet at least 75% of estimated needs       Nutrition Monitoring and Evaluation:   Behavioral-Environmental Outcomes: None Identified  Food/Nutrient Intake Outcomes: Enteral Nutrition Intake/Tolerance  Physical Signs/Symptoms Outcomes: Biochemical Data,Fluid Status or Edema,Hemodynamic Status,Weight,Skin,Nutrition Focused Physical Findings    Discharge Planning:     Too soon to determine     Malick Chahal MS, RD, LD  Contact: [phone]

## 2022-05-06 NOTE — PROGRESS NOTES
[facility] Neurology Progress Note      Patient:   Danitza Helton  MR#:    [id number]   Room:    0702/702-01   YOB: 1970  Date of Progress Note: 5/6/2022  Time of Note                           12:05 PM  Consulting Physician:  Rica Ochoa DO  Attending Physician:  Rui Swanson DO      INTERVAL HISTORY:  No acute events, remains intubated, sedated, still localizing to pain, less withdrawal to pain over the the right side, remains unchanged. REVIEW OF SYSTEMS:  Unable to obtain     PHYSICAL EXAM:    Constitutional    BP (!) 136/58   Pulse 83   Temp 97.8 °F (36.6 °C) (Temporal)   Resp 14   Ht 5' 6\" (1.676 m)   Wt 138 lb 7.2 oz (62.8 kg)   SpO2 98%   BMI 22.35 kg/m²   General appearance: Intubated, sedated   EYES -   Conjunctiva normal  Pupillary exam as below, see CN exam in the neurologic exam  ENT-    No scars, masses, or lesions over external nose or ears  Oropharynx - intubated  Cardiovascular -   No clubbing, cyanosis  Pulmonary-   Mechanically ventilated   Musculoskeletal    No significant wasting of muscles noted  Gait as below, see gait exam in the neurologic exam  Muscle strength, tone, stability as below see the motor exam in the neurologic exam.   No bony deformities  Skin    Warm, dry, and intact to inspection and palpation. No rash, erythema, or pallor        NEUROLOGICAL EXAM     Mental status    []? Awake, alert, oriented   []? Affect attention and concentration appear appropriate  []? Recent and remote memory appears unremarkable  []? Speech normal without dysarthria or aphasia, comprehension and repetition intact. COMMENTS:  Unresponsive to voice, not following commands, does appear to localize to pain    Cranial Nerves []? No VF deficit to confrontation,  optic discs normal, no papilledema on fundoscopic exam.  []? PERRLA, EOMI, no nystagmus, conjugate eye movements, no ptosis  []? Face symmetric  []? Facial sensation intact  []?  Tongue midline no atrophy or fasciculations present  []? Palate midline, hearing to finger rub normal  []? Shoulder shrug and SCM testing normal  COMMENTS:  PERRL, Face appears sym, OCR present   Motor   []? 5/5 strength x 4 extremities  [x]? Normal bulk and tone  [x]? No tremor present  []? No rigidity or bradykinesia noted  COMMENTS: Withdrawal x 4, less over RUE   Sensory  []? Sensation intact to light touch, pin prick, vibration, and proprioception BLE  []? Sensation intact to light touch, pin prick, vibration, and proprioception BUE  COMMENTS: Limited    Coordination []? FTN normal bilaterally   []? HTS normal bilaterally  []? SANFORD normal.   COMMENTS: Limited    Reflexes  [x]? Symmetric and non-pathological  [x]? Toes downgoing bilaterally  [x]? No clonus present  COMMENTS:   Gait                  []? Normal steady gait    []? Ataxic    []? Spastic     []? Magnetic     []? Shuffling  [x]? Not assessed  COMMENTS:        LABS/IMAGING:    CT Head WO Contrast    Result Date: 4/24/2022  CT HEAD WO CONTRAST 4/24/2022 2:01 PM HISTORY: Altered mental status COMPARISON: CT scan dated 11/18/2021 DOSE LENGTH PRODUCT: 766 mGy cm TECHNIQUE: Helical tomographic images of the brain were obtained without the use of intravenous contrast. Automated exposure control was also utilized to decrease patient radiation dose. FINDINGS: There is no evidence of evolving large vascular territory infarct. No visualized intra-axial or extra-axial hemorrhage. No mass lesion is identified. Normal size and configuration of the ventricular system. The basal cisterns are symmetric. Posterior fossa structures are unremarkable. The included orbits and their contents are unremarkable. Chronic paranasal sinus disease on the left. The visualized osseous structures and overlying soft tissues of the skull and face are unremarkable. 1. No acute intracranial process.  Signed by Dr Justice Landaverde    XR CHEST PORTABLE    Result Date: 4/24/2022  XR CHEST PORTABLE 4/24/2022 4:27 PM HISTORY: ET tube, enteric tube  Technique: Single AP view of the chest COMPARISONS: 4/24/2022 FINDINGS: Endotracheal tube is identified with tip essentially touching the krysta. Enteric tube courses into the stomach with tip curled in the fundus back towards the gastroesophageal junction. The lungs are clear and well expanded. Heart size is stable. Pulmonary vasculature are nondilated. No pleural effusion or pneumothorax. 1. Endotracheal tube is deep, tip essentially touching the krysta. Lungs are clear and well expanded. I would recommend 2-3 cm withdrawal for a more optimal positioning. The results of this exam were discussed with Antoni Dickens (ICU nursing) on 4/24/2022 at 1640 hours Signed by Dr Michael Mayes    XR CHEST PORTABLE    Result Date: 4/24/2022  XR CHEST PORTABLE 4/24/2022 1:17 PM HISTORY: ET tube placement  Technique: Single AP view of the chest COMPARISONS: 4/24/2022 FINDINGS: Status post endotracheal intubation. ET tube is in good position. Lungs are well-expanded. Enteric tube curls on itself in the midesophagus and then extends back up into the throat. Heart size is stable. Pulmonary vasculature are nondilated. 1. ET tube is in good position. Lungs are clear and well expanded. 2. Enteric tube curls on itself in the mid esophagus before extending back up into the throat. This will need repositioned. The results of this exam were discussed with Dr. Mary Ann Tee on 4/24/2022 at 1329 hours Signed by Dr Michael Mayes    XR CHEST PORTABLE    Result Date: 4/24/2022  XR CHEST PORTABLE 4/24/2022 12:34 PM HISTORY: Altered mental status  Technique: Single AP view of the chest COMPARISONS: Chest exam dated 4/1/2022 FINDINGS: No lung consolidation. No pleural effusion or pneumothorax. Cardiomediastinal silhouette and pulmonary vasculature are unremarkable. No acute bony abnormality. Left subclavian region vascular stent with additional stent projecting over the left humerus. No acute cardiopulmonary process. Signed by Dr Kajal Chan      Lab Results   Component Value Date    WBC 8.7 05/06/2022    HGB 10.3 (L) 05/06/2022    HCT 33.9 (L) 05/06/2022    MCV 93.4 05/06/2022     05/06/2022     Lab Results   Component Value Date     05/06/2022    K 4.8 05/06/2022    CL 91 (L) 05/06/2022    CO2 27 05/06/2022    BUN 65 (H) 05/06/2022    CREATININE 3.6 (H) 05/06/2022    GLUCOSE 144 (H) 05/06/2022    CALCIUM 9.0 05/06/2022    PROT 6.0 (L) 05/06/2022    LABALBU 3.0 (L) 05/06/2022    BILITOT 0.3 05/06/2022    ALKPHOS 96 05/06/2022    AST 13 05/06/2022    ALT 6 05/06/2022    LABGLOM 13 (A) 05/06/2022    GFRAA 16 (L) 05/06/2022     Lab Results   Component Value Date    INR 1.04 05/06/2022    INR 1.08 05/05/2022    INR 1.11 05/04/2022    PROTIME 13.5 05/06/2022    PROTIME 13.9 05/05/2022    PROTIME 14.3 05/04/2022       RECORD REVIEW:   Previous medical records, medications were reviewed at today's visit. Nursing/physician notes, imaging, labs and vitals reviewed. ASSESSMENT:  47 y.o. admitted after an episode of unresponsiveness followed by generalized tonic-clonic event in the emergency department. She had profoundly elevated glucose, hyponatremia, underlying UTI,  end-stage renal disease with a history of non-compliance noted. MRI with large scattered left MCA distribution stroke. ECHO negative from a cardioembolic standpoint. Failed extubation previously, remains on mechanical ventilation. Exam remains largely unchanged. No clinical seizure activity noted.      PLAN:  1. Stroke is large, risk for hemorraghic transformation is felt moderate to high. Now off coumadin, on ASA, ECHO negative for cardioembolic source, follow tele. Follow up hypercoagulable labs. 2.  Continue Keppra  3. Continue addressing respiratory failure, blood glucose, hyponatremia, UTI, c diff, ESRD nephrology following  4. Limit sedation as able  5.   Palliative care following, family wants continued aggressive care, surgery consulted for trach and PEG. Please feel free to call with any questions. [phone] (cell phone).       Araceli Mcdonald DO  Board Certified Neurology

## 2022-05-06 NOTE — PROGRESS NOTES
Hospitalist Progress Note    Patient:  Ina Zamarripa  YOB: 1970  Date of Service: 5/6/2022  MRN: [id number]   Acct: [de-identified]   Primary Care Physician: Armstead Siemens, APRN  Advance Directive: Full Code  Admit Date: 4/24/2022       Hospital Day: 12  Referring Provider: Marisela Newsome DO    Patient Seen, Chart, Consults, Notes, Labs, Radiology studies reviewed. Subjective:  Ina Zamarripa is a 47 y.o. female  whom we are following for acute left ischemic MCA infarct, seizures, end-stage renal disease, chronic hypoxemic respiratory failure. Significant other is agreeable for PEG and trach placement. General surgery will plan placement sometime next week. She is on assist-control rate of 14, tidal volume of 450, 5 of PEEP, FiO2 0.35.     Allergies:  Penicillins, Lamisil advanced [terbinafine], Reglan [metoclopramide], and Stadol [butorphanol]    Medicines:  Current Facility-Administered Medications   Medication Dose Route Frequency Provider Last Rate Last Admin    acetylcysteine (MUCOMYST) 20 % solution 600 mg  600 mg Inhalation BID Marisela Newsome, DO   600 mg at 05/06/22 1808    ipratropium-albuterol (DUONEB) nebulizer solution 1 ampule  1 ampule Inhalation Q4H Marisela Cuevasmarlene, DO   1 ampule at 05/06/22 1808    insulin glargine (LANTUS) injection vial 20 Units  20 Units SubCUTAneous BID Marisela Newsome, DO   20 Units at 05/06/22 0529    hydrALAZINE (APRESOLINE) injection 10 mg  10 mg IntraVENous Q6H PRN Annika Lama MD   10 mg at 05/06/22 0940    aspirin chewable tablet 81 mg  81 mg Oral Daily Annika Lama MD   81 mg at 05/06/22 0813    racepinephrine HCl (VAPONEFPRIN) 2.25 % nebulizer solution NEBU 11.25 mg  11.25 mg Nebulization Q4H PRN Annika Lama MD   11.25 mg at 05/03/22 1408    sodium chloride nebulizer 0.9 % solution 3 mL  3 mL Nebulization Q4H PRN Annika Lama MD        methylPREDNISolone sodium (SOLU-MEDROL) injection 40 mg  40 mg IntraVENous Q8H [place] CASTLE Luisana Colin MD   40 mg at 05/06/22 1552    propofol injection  5-50 mcg/kg/min IntraVENous Continuous Dustin Solis MD 17.9 mL/hr at 05/06/22 1419 50 mcg/kg/min at 05/06/22 1419    heparin (porcine) injection 5,000 Units  5,000 Units SubCUTAneous TID Dustin Solis MD   5,000 Units at 05/06/22 1552    Peppermint Spirit SPRT   Does not apply PRN Marian Barer, DO        dexmedetomidine (PRECEDEX) 400 mcg in sodium chloride 0.9 % 100 mL infusion  0.1-1.5 mcg/kg/hr IntraVENous Continuous Marian Barer, DO   Stopped at 05/03/22 1143    menthol-zinc oxide (CALMOSEPTINE) 0.44-20.6 % ointment   Topical BID Dustin Solis MD   Given at 05/06/22 1834    sodium phosphate 10.5 mmol in sodium chloride 0.9 % 250 mL IVPB  0.16 mmol/kg IntraVENous PRN Patience Walter, DO        Or    sodium phosphate 21 mmol in sodium chloride 0.9 % 250 mL IVPB  0.32 mmol/kg IntraVENous PRN Patience Walter, DO        insulin lispro (HUMALOG) injection vial 0-12 Units  0-12 Units SubCUTAneous TID WC Marian Barer, DO   4 Units at 05/05/22 1739    insulin lispro (HUMALOG) injection vial 0-6 Units  0-6 Units SubCUTAneous Nightly Marian Barer, DO   1 Units at 05/05/22 2153    vancomycin (VANCOCIN) oral solution 125 mg  125 mg Oral 4 times per day Marian Barer, DO   125 mg at 05/06/22 1834    hydrALAZINE (APRESOLINE) tablet 100 mg  100 mg Oral 3 times per day Patience Walter, DO   100 mg at 05/06/22 1011    glucagon (rDNA) injection 1 mg  1 mg IntraMUSCular PRN Marian Barer, DO        dextrose 5 % solution  100 mL/hr IntraVENous PRN Marian Barer, DO        glucose chewable tablet 16 g  4 tablet Oral PRN Marian Barer, DO        dextrose bolus (hypoglycemia) 10% 125 mL  125 mL IntraVENous PRN Marian Barer, DO   Stopped at 04/25/22 0657    Or    dextrose bolus (hypoglycemia) 10% 250 mL  250 mL IntraVENous PRN Marian Barer, DO        sodium chloride flush 0.9 % injection 5-40 mL  5-40 mL IntraVENous 2 times per day Donalee Heck, DO   10 mL at 05/06/22 6904    sodium chloride flush 0.9 % injection 5-40 mL  5-40 mL IntraVENous PRN Donalee Heck, DO        0.9 % sodium chloride infusion   IntraVENous PRN Donalee Heck, DO   Stopped at 05/03/22 2005    LORazepam (ATIVAN) injection 1 mg  1 mg IntraVENous Q5 Min PRN Donalee Heck, DO        ondansetron (ZOFRAN-ODT) disintegrating tablet 4 mg  4 mg Oral Q8H PRN Donalee Heck, DO        Or    ondansetron [facility]) injection 4 mg  4 mg IntraVENous Q6H PRN Donalee Heck, DO        polyethylene glycol [facility]) packet 17 g  17 g Oral Daily PRN Donalee Heck, DO        acetaminophen (TYLENOL) tablet 650 mg  650 mg Oral Q6H PRN Donalee Heck, DO   650 mg at 04/25/22 4787    Or    acetaminophen (TYLENOL) suppository 650 mg  650 mg Rectal Q6H PRN Donalee Heck, DO        levETIRAcetam (KEPPRA) 500 mg/100 mL IVPB  500 mg IntraVENous Q12H Donalee Heck, DO   Stopped at 05/06/22 1327    chlorhexidine (PERIDEX) 0.12 % solution 15 mL  15 mL Mouth/Throat BID Donalee Heck, DO   15 mL at 05/06/22 0816    famotidine (PEPCID) 20 mg in sodium chloride (PF) 10 mL injection  20 mg IntraVENous Daily Donalee Heck, DO   20 mg at 05/06/22 9088    magic butt cream   Topical Q4H PRN Donalee Heck, DO   Given at 05/03/22 0940    magnesium sulfate 1000 mg in dextrose 5% 100 mL IVPB  1,000 mg IntraVENous PRN Patience Walter, DO        potassium bicarb-citric acid (EFFER-K) effervescent tablet 40 mEq  40 mEq Oral PRN Patience Walter, DO        Or    potassium chloride 10 mEq/100 mL IVPB (Peripheral Line)  10 mEq IntraVENous PRN Florette Roll, DO   Stopped at 04/25/22 4242       Past Medical History:  Past Medical History:   Diagnosis Date    Acute ischemic stroke ([address] 4/30/2022    Arthritis     Chronic kidney disease, stage 5, kidney failure (HCC)     Closed fracture of right shoulder girdle, with routine healing, subsequent encounter     DM (diabetes mellitus) type II controlled with renal manifestation ([facility] 06/1993    Gastroparesis     GERD (gastroesophageal reflux disease)     Hemodialysis patient ([facility]     dialysis on tues, thur, and sat at [facility]    Hypertension     Hypothyroid     Nasal congestion     recent    Neuropathy     Noncompliance with medications     Palliative care patient 10/08/2019    Restless leg syndrome        Past Surgical History:  Past Surgical History:   Procedure Laterality Date    BREAST SURGERY Left 2008    infected milk gland removed    BREAST SURGERY Right 5/25/2021    WEDGE EXCISION RIGHT BREAST DUCT WITH LACRIMAL DUCT PROBE, PEC BLOCK performed by Hodan Harris MD at [address], LAPAROSCOPIC      2008    COLONOSCOPY Left 9/17/2020    Dr Edvin Chappell hepatic flexure-Severe tortuosity with severe spasming, 1 yr recall    DIALYSIS FISTULA CREATION Left 1/25/2019    REVISION LEFT UPPER EXTREMITY AV ACCESS WITH LEFT BRACHIAL ARTERY TO LEFT AXILLARY VEIN WITH  INTERPOSITIONAL ARTEGRAFT   performed by Abbie Aguirre MD at [address]  2012    [address] Right 1/25/2019    INSERTION OF RIGHT INTERNAL JUGULAR HEMODIALYSIS CATHETER performed by Abbie Aguirre MD at [address] [facility]  08/17/2018    1.  Moderately increased stainable iron identified by special stain in Kupffer cells and occasional hepatocytes. Negative for evidence of significant portal or lobular inflammation.  Negative for evidence of significant fibrosis    SC COLONOSCOPY W/BIOPSY SINGLE/MULTIPLE N/A 10/20/2017    Dr Reena Smith prep-Tubular AP (-) dysplasia x 2--3 yr recall    SC EGD TRANSORAL BIOPSY SINGLE/MULTIPLE N/A 12/27/2016    Dr Deandra Baker EGD TRANSORAL BIOPSY SINGLE/MULTIPLE N/A 10/20/2017    Dr Sandee Flor (-)   [facility] VASCULAR SURGERY Left 2016    fistula by  [place] at [address]  10/02/2017    [facility]. Left upper fistulograms/venograms, balloon angioplasty cephalic vein arch 01W58 conquest.    VASCULAR SURGERY  2018    FISTULOGRAM    VASCULAR SURGERY  10/29/2018    [facility]. Left upper fistulograms/venograms    VASCULAR SURGERY  2018    [facility]. Arch aortogram,left upper arteriograms.  VASCULAR SURGERY  2019    [facility]. Removal of tunneled dialyis catheter right internal jugular vein.  VASCULAR SURGERY  2019    [facility]. Left upper extremity fistulogram including venography to the superior vena cava. Balloon angioplasty left subclavian vein with 10mm x 40mm conquest balloon. Balloon angioplasty mid/proximal upper arm cephalic vein with 35BG x 40mm conquest balloon. Venograms after balloon angioplasty. Stent left mid/proximal upper arm cephalic vein stenosis fluency 10mm x 60mm self-expanding covered stent. Balloon     VASCULAR SURGERY  2019    cont angioplasty stent with 10mm x 40mm conquest balloon. Completion venograms left upper extremity.        Family History  Family History   Problem Relation Age of Onset    Colon Cancer Mother          at 63    Colon Polyps Mother     Lung Cancer Father          at 79    Colon Polyps Father     Stomach Cancer Sister     Breast Cancer Sister 27    Depression Daughter 25        committed suicide    Liver Cancer Neg Hx     Liver Disease Neg Hx     Esophageal Cancer Neg Hx     Rectal Cancer Neg Hx        Social History  Social History     Socioeconomic History    Marital status: Single     Spouse name: Not on file    Number of children: 2    Years of education: Not on file    Highest education level: Not on file   Occupational History    Not on file   Tobacco Use    Smoking status: Current Every Day Smoker     Packs/day: 0.50     Years: 33.00     Pack years: 16.50     Types: Cigarettes    Smokeless tobacco: Never Used    Tobacco comment: NOW at 1/4 PD, started at age 24 and has averaged 2 PPD   Vaping Use    Vaping Use: Never used   Substance and Sexual Activity    Alcohol use: No    Drug use: Not Currently     Types: Marijuana Frongladys Velasquez)    Sexual activity: Not Currently     Partners: Male   Other Topics Concern    Not on file   Social History Narrative    Not on file     Social Determinants of Health     Financial Resource Strain:     Difficulty of Paying Living Expenses: Not on file   Food Insecurity:     Worried About Running Out of Food in the Last Year: Not on file    Kendy of Food in the Last Year: Not on file   Transportation Needs:     Lack of Transportation (Medical): Not on file    Lack of Transportation (Non-Medical): Not on file   Physical Activity:     Days of Exercise per Week: Not on file    Minutes of Exercise per Session: Not on file   Stress:     Feeling of Stress : Not on file   Social Connections:     Frequency of Communication with Friends and Family: Not on file    Frequency of Social Gatherings with Friends and Family: Not on file    Attends [employer] Services: Not on file    Active Member of [address] [place] [address] or Organizations: Not on file    Attends Club or Organization Meetings: Not on file    Marital Status: Not on file   Intimate Partner Violence:     Fear of Current or Ex-Partner: Not on file    Emotionally Abused: Not on file    Physically Abused: Not on file    Sexually Abused: Not on file   Housing Stability:     Unable to Pay for Housing in the Last Year: Not on file    Number of [place] in the Last Year: Not on file    Unstable Housing in the Last Year: Not on file         Review of Systems:    Review of Systems   Unable to perform ROS: Intubated           Objective:  Blood pressure (!) 134/56, pulse 82, temperature 97.3 °F (36.3 °C), temperature source Temporal, resp. rate 14, height 5' 6\" (1.676 m), weight 138 lb 7.2 oz (62.8 kg), SpO2 100 %.     Intake/Output Summary (Last 24 hours) at 5/6/2022 1844  Last data filed at 5/6/2022 1800  Gross per 24 hour   Intake 1453.44 ml   Output 20 ml   Net 1433.44 ml       Physical Exam  Vitals and nursing note reviewed. Constitutional:       Appearance: She is ill-appearing. Interventions: She is sedated and intubated. HENT:      Head: Normocephalic and atraumatic. Right Ear: External ear normal.      Left Ear: External ear normal.      Nose: Nose normal.      Mouth/Throat:      Mouth: Mucous membranes are moist.   Eyes:      Pupils: Pupils are equal, round, and reactive to light. Cardiovascular:      Rate and Rhythm: Normal rate and regular rhythm. Heart sounds: Normal heart sounds. Pulmonary:      Effort: Pulmonary effort is normal. She is intubated. Breath sounds: Normal breath sounds. Abdominal:      General: Abdomen is flat. Palpations: Abdomen is soft. Musculoskeletal:      Cervical back: Neck supple. No rigidity. No muscular tenderness. Skin:     General: Skin is warm and dry. Labs:  BMP:   Recent Labs     05/04/22  0150 05/04/22  0404 05/05/22 0135 05/06/22 0239   *  --  136 136   K 4.4 4.1 4.2 4.8   CL 89*  --  91* 91*   CO2 26  --  28 27   PHOS 5.0*  --  4.9* 6.0*   BUN 51*  --  43* 65*   CREATININE 3.9*  --  2.9* 3.6*   CALCIUM 9.4  --  9.1 9.0     CBC:   Recent Labs     05/04/22  0150 05/05/22 0135 05/06/22 0239   WBC 8.9 7.5 8.7   HGB 9.8* 9.9* 10.3*   HCT 32.2* 31.7* 33.9*   MCV 93.6 91.9 93.4    266 237     LIVER PROFILE:   Recent Labs     05/04/22  0150 05/05/22 0135 05/06/22 0239   AST 15 13 13   ALT 8 6 6   BILITOT 0.3 <0.2 0.3   ALKPHOS 114* 100 96     PT/INR:   Recent Labs     05/04/22  0150 05/05/22 0135 05/06/22 0239   PROTIME 14.3 13.9 13.5   INR 1.11 1.08 1.04     APTT: No results for input(s): APTT in the last 72 hours. BNP:  No results for input(s): BNP in the last 72 hours. Ionized Calcium:No results for input(s): IONCA in the last 72 hours.   Magnesium:  Recent Labs     05/04/22  0150 05/05/22  0135 05/06/22  0239   MG 2.7* 2.4 2.6     Phosphorus:  Recent Labs     05/04/22  0150 05/05/22  0135 05/06/22  0239   PHOS 5.0* 4.9* 6.0*     HgbA1C: No results for input(s): LABA1C in the last 72 hours. Hepatic:   Recent Labs     05/04/22  0150 05/05/22  0135 05/06/22  0239   ALKPHOS 114* 100 96   ALT 8 6 6   AST 15 13 13   PROT 6.2* 5.9* 6.0*   BILITOT 0.3 <0.2 0.3   LABALBU 3.1* 2.9* 3.0*     Lactic Acid: No results for input(s): LACTA in the last 72 hours. Troponin: No results for input(s): CKTOTAL, CKMB, TROPONINT in the last 72 hours. ABGs: No results for input(s): PH, PCO2, PO2, HCO3, O2SAT in the last 72 hours. CRP:  No results for input(s): CRP in the last 72 hours. Sed Rate:  No results for input(s): SEDRATE in the last 72 hours. Cultures:   No results for input(s): CULTURE in the last 72 hours. No results for input(s): BC, Vickie Canter in the last 72 hours. No results for input(s): CXSURG in the last 72 hours. Radiology reports as per the Radiologist  Radiology: CT Head WO Contrast    Result Date: 4/24/2022  CT HEAD WO CONTRAST 4/24/2022 2:01 PM HISTORY: Altered mental status COMPARISON: CT scan dated 11/18/2021 DOSE LENGTH PRODUCT: 766 mGy cm TECHNIQUE: Helical tomographic images of the brain were obtained without the use of intravenous contrast. Automated exposure control was also utilized to decrease patient radiation dose. FINDINGS: There is no evidence of evolving large vascular territory infarct. No visualized intra-axial or extra-axial hemorrhage. No mass lesion is identified. Normal size and configuration of the ventricular system. The basal cisterns are symmetric. Posterior fossa structures are unremarkable. The included orbits and their contents are unremarkable. Chronic paranasal sinus disease on the left. The visualized osseous structures and overlying soft tissues of the skull and face are unremarkable. 1. No acute intracranial process.  Signed by Dr Justice Landaverde    XR CHEST PORTABLE    Result Date: 4/24/2022  XR CHEST PORTABLE 4/24/2022 4:27 PM HISTORY: ET tube, enteric tube  Technique: Single AP view of the chest COMPARISONS: 4/24/2022 FINDINGS: Endotracheal tube is identified with tip essentially touching the krysta. Enteric tube courses into the stomach with tip curled in the fundus back towards the gastroesophageal junction. The lungs are clear and well expanded. Heart size is stable. Pulmonary vasculature are nondilated. No pleural effusion or pneumothorax. 1. Endotracheal tube is deep, tip essentially touching the krysta. Lungs are clear and well expanded. I would recommend 2-3 cm withdrawal for a more optimal positioning. The results of this exam were discussed with Bianca Hansen (ICU nursing) on 4/24/2022 at 1640 hours Signed by Dr Obed Bear    XR CHEST PORTABLE    Result Date: 4/24/2022  XR CHEST PORTABLE 4/24/2022 1:17 PM HISTORY: ET tube placement  Technique: Single AP view of the chest COMPARISONS: 4/24/2022 FINDINGS: Status post endotracheal intubation. ET tube is in good position. Lungs are well-expanded. Enteric tube curls on itself in the midesophagus and then extends back up into the throat. Heart size is stable. Pulmonary vasculature are nondilated. 1. ET tube is in good position. Lungs are clear and well expanded. 2. Enteric tube curls on itself in the mid esophagus before extending back up into the throat. This will need repositioned. The results of this exam were discussed with Dr. Saint Clair on 4/24/2022 at 1329 hours Signed by Dr Obed Bear    XR CHEST PORTABLE    Result Date: 4/24/2022  XR CHEST PORTABLE 4/24/2022 12:34 PM HISTORY: Altered mental status  Technique: Single AP view of the chest COMPARISONS: Chest exam dated 4/1/2022 FINDINGS: No lung consolidation. No pleural effusion or pneumothorax. Cardiomediastinal silhouette and pulmonary vasculature are unremarkable. No acute bony abnormality.  Left subclavian region vascular stent with additional stent projecting over the left humerus. No acute cardiopulmonary process. Signed by Dr Boston Marie. Continue Keppra. Neurology following.     Acute left ischemic MCA stroke. Continue supportive care.     Acute hypoxemic respiratory failure. Continue ventilatory support. Proceed with trach and PEG.    Nonketotic hyperosmolar hyperglycemia. Resolved  Transitioned to subcutaneous insulin     End-stage renal disease. Dialysis today for additional ultrafiltration.     Please document 38 minutes of critical care time for patient assessment, chart review, discussion with staff, .       Haider Harrison,

## 2022-05-06 NOTE — PROGRESS NOTES
Nephrology ([facility]) Progress Note    Patient:  Jj Henley  YOB: 1970  Date of Service: 5/6/2022  MRN: [id number]   Acct: [de-identified]   Primary Care Physician: ROMINA Kendall  Advance Directive: Full Code  Admit Date: 4/24/2022       Hospital Day: 12  Referring Provider: Gildardo Duane, DO    Patient independently seen and examined, Chart, Consults, Notes, Operative notes, Labs, Cardiology, and Radiology studies reviewed as available. Subjective:  Jj Henley is a 47 y.o. female for whom we were consulted for evaluation and treatment of end-stage renal disease. Patient also has a history of seizure disorder, type 2 diabetes, hypertension, frequent hospitalization with noncompliance and poorly controlled diabetes. Patient was brought to the emergency room after she was found to have unresponsiveness and witnessed grand mal seizure. She was brought in by ambulance. On arrival in the emergency room, she was found to be severely hyperglycemic and had a urinary tract infection. Patient was intubated, sedated and treated by neurology for grand mal seizure. She was also receiving IV antibiotics and insulin drip. Her blood sugar control had significantly improved. On April 25, she has received emergent hemodialysis treatment for correction of volume status as well as hyponatremia. She was moved from ICU to CCU. Today, patient remains on the ventilator and unable to participate in the history and physical examination. Events reviewed with nursing at the bedside. No family present. No issues with dialysis this week.       Allergies:  Penicillins, Lamisil advanced [terbinafine], Reglan [metoclopramide], and Stadol [butorphanol]    Medicines:  Current Facility-Administered Medications   Medication Dose Route Frequency Provider Last Rate Last Admin    acetylcysteine (MUCOMYST) 20 % solution 600 mg  600 mg Inhalation BID Gildardo Duane, DO        Tariq Sanes, DO   1 Units at 05/05/22 2153    vancomycin (VANCOCIN) oral solution 125 mg  125 mg Oral 4 times per day Jillian Gazella, DO   125 mg at 05/06/22 1141    hydrALAZINE (APRESOLINE) tablet 100 mg  100 mg Oral 3 times per day Patience Walter, DO   100 mg at 05/06/22 1011    glucagon (rDNA) injection 1 mg  1 mg IntraMUSCular PRN Jillian Gazella, DO        dextrose 5 % solution  100 mL/hr IntraVENous PRN Jillian Gazella, DO        glucose chewable tablet 16 g  4 tablet Oral PRN Jillian Gazella, DO        dextrose bolus (hypoglycemia) 10% 125 mL  125 mL IntraVENous PRN Jillian Gazella, DO   Stopped at 04/25/22 1115    Or    dextrose bolus (hypoglycemia) 10% 250 mL  250 mL IntraVENous PRN Jillian Gazella, DO        sodium chloride flush 0.9 % injection 5-40 mL  5-40 mL IntraVENous 2 times per day Jillian Gazella, DO   10 mL at 05/06/22 8480    sodium chloride flush 0.9 % injection 5-40 mL  5-40 mL IntraVENous PRN Jillian Gazella, DO        0.9 % sodium chloride infusion   IntraVENous PRN Jillian Gazella, DO   Stopped at 05/03/22 2005    LORazepam (ATIVAN) injection 1 mg  1 mg IntraVENous Q5 Min PRN Jillian Gazella, DO        ondansetron (ZOFRAN-ODT) disintegrating tablet 4 mg  4 mg Oral Q8H PRN Jillian Gazella, DO        Or    [facility]ron [facility] injection 4 mg  4 mg IntraVENous Q6H PRN Jillian Gazella, DO        polyethylene glycol [facility]) packet 17 g  17 g Oral Daily PRN Jillian Gazella, DO        acetaminophen (TYLENOL) tablet 650 mg  650 mg Oral Q6H PRN Jillian Gazella, DO   650 mg at 04/25/22 8108    Or    acetaminophen (TYLENOL) suppository 650 mg  650 mg Rectal Q6H PRN Jillian Gazella, DO        levETIRAcetam (KEPPRA) 500 mg/100 mL IVPB  500 mg IntraVENous Q12H Jillianhina Berkowitz,    Stopped at 05/06/22 0123    chlorhexidine (PERIDEX) 0.12 % solution 15 mL  15 mL Mouth/Throat BID Jillian Gazella, DO   15 mL at 05/06/22 0816    famotidine (PEPCID) 20 mg in sodium chloride (PF) 10 mL injection  20 mg IntraVENous Daily Kathye Primus, DO   20 mg at 05/06/22 8180    magic butt cream   Topical Q4H PRN Kathye Primus, DO   Given at 05/03/22 0940    magnesium sulfate 1000 mg in dextrose 5% 100 mL IVPB  1,000 mg IntraVENous PRN Patience Walter, DO        potassium bicarb-citric acid (EFFER-K) effervescent tablet 40 mEq  40 mEq Oral PRN Patience Walter, DO        Or    potassium chloride 10 mEq/100 mL IVPB (Peripheral Line)  10 mEq IntraVENous PRN Gttorri Alexa, DO   Stopped at 04/25/22 0936       Past Medical History:  Past Medical History:   Diagnosis Date    Acute ischemic stroke ([facility] 4/30/2022    Arthritis     Chronic kidney disease, stage 5, kidney failure (HCC)     Closed fracture of right shoulder girdle, with routine healing, subsequent encounter     DM (diabetes mellitus) type II controlled with renal manifestation ([facility] [address] [facility] 06/1993    Gastroparesis     GERD (gastroesophageal reflux disease)     Hemodialysis patient ([facility] [address] [facility]     dialysis on tues, ur, and sat at [facility]    Hypertension     Hypothyroid     Nasal congestion     recent    Neuropathy     Noncompliance with medications     Palliative care patient 10/08/2019    Restless leg syndrome        Past Surgical History:  Past Surgical History:   Procedure Laterality Date    BREAST SURGERY Left 2008    infected milk gland removed    BREAST SURGERY Right 5/25/2021    WEDGE EXCISION RIGHT BREAST DUCT WITH LACRIMAL DUCT PROBE, PEC BLOCK performed by Johny Lucio MD at [address] [place] [address], LAPAROSCOPIC      2008    COLONOSCOPY Left 9/17/2020    Dr Jodie Vicente hepatic flexure-Severe tortuosity with severe spasming, 1 yr recall    DIALYSIS FISTULA CREATION Left 1/25/2019    REVISION LEFT UPPER EXTREMITY AV ACCESS WITH LEFT BRACHIAL ARTERY TO LEFT AXILLARY VEIN WITH  INTERPOSITIONAL ARTEGRAFT   performed by Amor Radford MD at [facility] ENDOMETRIAL ABLATION      HC DIALYSIS CATHETER Right 2019    INSERTION OF RIGHT INTERNAL JUGULAR HEMODIALYSIS CATHETER performed by Bronwyn Johnson MD at [address]  2018    1.  Moderately increased stainable iron identified by special stain in Kupffer cells and occasional hepatocytes. Negative for evidence of significant portal or lobular inflammation. Negative for evidence of significant fibrosis    MD COLONOSCOPY W/BIOPSY SINGLE/MULTIPLE N/A 10/20/2017    Dr Reji Herndon prep-Tubular AP (-) dysplasia x 2--3 yr recall    MD EGD TRANSORAL BIOPSY SINGLE/MULTIPLE N/A 2016    Dr America Loomis EGD TRANSORAL BIOPSY SINGLE/MULTIPLE N/A 10/20/2017    Dr Anshu Major (-)   [facility] VASCULAR SURGERY Left     fistula by Dr Ellen Joy at [address]  10/02/2017    SJS. Left upper fistulograms/venograms, balloon angioplasty cephalic vein arch [id number] conquest.    VASCULAR SURGERY  2018    FISTULOGRAM    VASCULAR SURGERY  10/29/2018    SJS. Left upper fistulograms/venograms    VASCULAR SURGERY  2018    SJS. Arch aortogram,left upper arteriograms.  VASCULAR SURGERY  2019    SJS. Removal of tunneled dialyis catheter right internal jugular vein.  VASCULAR SURGERY  2019    SJS. Left upper extremity fistulogram including venography to the superior vena cava. Balloon angioplasty left subclavian vein with 10mm x 40mm conquest balloon. Balloon angioplasty mid/proximal upper arm cephalic vein with 77OU x 40mm conquest balloon. Venograms after balloon angioplasty. Stent left mid/proximal upper arm cephalic vein stenosis fluency 10mm x 60mm self-expanding covered stent. Balloon     VASCULAR SURGERY  2019    cont angioplasty stent with 10mm x 40mm conquest balloon. Completion venograms left upper extremity.        Family History  Family History   Problem Relation Age of Onset    Colon Cancer Mother          at 63    Colon Polyps Mother     Lung Cancer Father          at 79    Colon Polyps Father     Stomach Cancer Sister     Breast Cancer Sister 27    Depression Daughter 25        committed suicide    Liver Cancer Neg Hx     Liver Disease Neg Hx     Esophageal Cancer Neg Hx     Rectal Cancer Neg Hx        Social History  Social History     Socioeconomic History    Marital status: Single     Spouse name: Not on file    Number of children: 2    Years of education: Not on file    Highest education level: Not on file   Occupational History    Not on file   Tobacco Use    Smoking status: Current Every Day Smoker     Packs/day: 0.50     Years: 33.00     Pack years: 16.50     Types: Cigarettes    Smokeless tobacco: Never Used    Tobacco comment: NOW at 1/4 PD, started at age 24 and has averaged 2 PPD   Vaping Use    Vaping Use: Never used   Substance and Sexual Activity    Alcohol use: No    Drug use: Not Currently     Types: Marijuana Dena Aver)    Sexual activity: Not Currently     Partners: Male   Other Topics Concern    Not on file   Social History Narrative    Not on file     Social Determinants of Health     Financial Resource Strain:     Difficulty of Paying Living Expenses: Not on file   Food Insecurity:     Worried About Running Out of Food in the Last Year: Not on file    Kendy of Food in the Last Year: Not on file   Transportation Needs:     Lack of Transportation (Medical): Not on file    Lack of Transportation (Non-Medical):  Not on file   Physical Activity:     Days of Exercise per Week: Not on file    Minutes of Exercise per Session: Not on file   Stress:     Feeling of Stress : Not on file   Social Connections:     Frequency of Communication with Friends and Family: Not on file    Frequency of Social Gatherings with Friends and Family: Not on file    Attends Hoahaoism Services: Not on file    Active Member of Clubs or Organizations: Not on file    Attends Club or Organization Meetings: Not on file    Marital Status: Not on file   Intimate Partner Violence:     Fear of Current or Ex-Partner: Not on file    Emotionally Abused: Not on file    Physically Abused: Not on file    Sexually Abused: Not on file   Housing Stability:     Unable to Pay for Housing in the Last Year: Not on file    Number of Places Lived in the Last Year: Not on file    Unstable Housing in the Last Year: Not on file         Review of Systems:  History obtained from unobtainable from patient due to mental status          Objective:  Patient Vitals for the past 24 hrs:   BP Temp Temp src Pulse Resp SpO2 Height Weight   05/06/22 1220       5' 6\" (1.676 m)    05/06/22 1200 (!) 136/58 97.8 °F (36.6 °C) Temporal 83 14 98 %     05/06/22 1100 (!) 153/61   86 14 98 %     05/06/22 1000 (!) 174/65   89 15 98 %     05/06/22 0900 (!) 189/64   89 17 100 %     05/06/22 0800 (!) 174/62 97.2 °F (36.2 °C) Temporal 76 14 94 %     05/06/22 0700 (!) 175/61   86 14 99 %     05/06/22 0656    77 14 (!) 89 %     05/06/22 0600 (!) 181/64   79 14 99 %  138 lb 7.2 oz (62.8 kg)   05/06/22 0500 (!) 174/62   78 14 97 %     05/06/22 0400 (!) 183/64 97.3 °F (36.3 °C) Temporal 84 14 97 %     05/06/22 0300 (!) 166/69   71 14 99 %     05/06/22 0221    72 14 99 %     05/06/22 0200 (!) 162/69   72 14 99 %     05/06/22 0100 (!) 169/68   73 14      05/06/22 0000 (!) 164/66 97.4 °F (36.3 °C) Temporal 73 14 99 %     05/05/22 2300 (!) 162/64   72 14 99 %     05/05/22 2200 (!) 208/72   75 14 99 %     05/05/22 2100 (!) 158/63   73 14 99 %     05/05/22 2053 (!) 169/71          05/05/22 2000 (!) 152/65 98.5 °F (36.9 °C) Temporal 73 14 100 %     05/05/22 1900 (!) 170/77   75 14 100 %     05/05/22 1800 (!) 180/72   76 14 100 %     05/05/22 1700 (!) 178/70   74 14 100 %     05/05/22 1600 (!) 172/67 96.9 °F (36.1 °C) Temporal 72 14 100 %     05/05/22 1500 133/65   74 14 100 %     05/05/22 1421    73 14 100 %     05/05/22 1400 (!) 163/68   73 14 100 %     05/05/22 1300 (!) 163/68   76 14 100 %         Intake/Output Summary (Last 24 hours) at 5/6/2022 1240  Last data filed at 5/6/2022 1200  Gross per 24 hour   Intake 1635.84 ml   Output 10 ml   Net 1625.84 ml     General: Ventilated and unable to participate in the history and physical examination  Chest:  clear to auscultation bilaterally  CVS: regular rate and rhythm  Abdominal: soft, nontender, normal bowel sounds  Extremities: no cyanosis or edema  Skin: warm and dry without rash    Labs:  BMP:   Recent Labs     05/03/22  1831 05/04/22  0150 05/04/22  0404 05/05/22 0135 05/06/22 0239   NA  --  133*  --  136 136   K   < > 4.4 4.1 4.2 4.8   CL  --  89*  --  91* 91*   CO2  --  26  --  28 27   PHOS  --  5.0*  --  4.9* 6.0*   BUN  --  51*  --  43* 65*   CREATININE  --  3.9*  --  2.9* 3.6*   CALCIUM  --  9.4  --  9.1 9.0    < > = values in this interval not displayed. CBC:   Recent Labs     05/04/22  0150 05/05/22 0135 05/06/22 0239   WBC 8.9 7.5 8.7   HGB 9.8* 9.9* 10.3*   HCT 32.2* 31.7* 33.9*   MCV 93.6 91.9 93.4    266 237     LIVER PROFILE:   Recent Labs     05/04/22  0150 05/05/22 0135 05/06/22  0239   AST 15 13 13   ALT 8 6 6   BILITOT 0.3 <0.2 0.3   ALKPHOS 114* 100 96     PT/INR:   Recent Labs     05/04/22  0150 05/05/22 0135 05/06/22 0239   PROTIME 14.3 13.9 13.5   INR 1.11 1.08 1.04     APTT: No results for input(s): APTT in the last 72 hours. BNP:  No results for input(s): BNP in the last 72 hours. Ionized Calcium:No results for input(s): IONCA in the last 72 hours. Magnesium:  Recent Labs     05/04/22 0150 05/05/22 0135 05/06/22  0239   MG 2.7* 2.4 2.6     Phosphorus:  Recent Labs     05/04/22 0150 05/05/22 0135 05/06/22  0239   PHOS 5.0* 4.9* 6.0*     HgbA1C:   No results for input(s): LABA1C in the last 72 hours.   Hepatic:   Recent Labs     05/04/22 0150 05/05/22 0135 05/06/22  0239   ALKPHOS 114* 100 96   ALT 8 6 6   AST 15 13 13   PROT 6.2* 5.9* 6.0*   BILITOT 0.3 <0.2 0.3   LABALBU 3.1* 2.9* 3.0*     Lactic Acid: No results for input(s): LACTA in the last 72 hours. Troponin: No results for input(s): CKTOTAL, CKMB, TROPONINT in the last 72 hours. ABGs:   Lab Results   Component Value Date    PHART 7.540 05/04/2022    PO2ART 119.0 05/04/2022    YGQ8VIG 36.0 05/04/2022     CRP:  No results for input(s): CRP in the last 72 hours. Sed Rate:  No results for input(s): SEDRATE in the last 72 hours. Culture Results:   Blood Culture Recent:   Recent Labs     04/24/22  1447   BC No growth after 5 days of incubation. Urine Culture Recent :   Recent Labs     04/24/22  1354   LABURIN No growth at 36-48 hours       Radiology reports as per the Radiologist  Radiology: CT Head WO Contrast    Result Date: 4/24/2022  CT HEAD WO CONTRAST 4/24/2022 2:01 PM HISTORY: Altered mental status COMPARISON: CT scan dated 11/18/2021 DOSE LENGTH PRODUCT: 766 mGy cm TECHNIQUE: Helical tomographic images of the brain were obtained without the use of intravenous contrast. Automated exposure control was also utilized to decrease patient radiation dose. FINDINGS: There is no evidence of evolving large vascular territory infarct. No visualized intra-axial or extra-axial hemorrhage. No mass lesion is identified. Normal size and configuration of the ventricular system. The basal cisterns are symmetric. Posterior fossa structures are unremarkable. The included orbits and their contents are unremarkable. Chronic paranasal sinus disease on the left. The visualized osseous structures and overlying soft tissues of the skull and face are unremarkable. 1. No acute intracranial process.  Signed by Dr Nu Gamez    Result Date: 4/24/2022  XR CHEST PORTABLE 4/24/2022 4:27 PM HISTORY: ET tube, enteric tube  Technique: Single AP view of the chest COMPARISONS: 4/24/2022 FINDINGS: Endotracheal tube is identified with tip essentially touching the krysta. Enteric tube courses into the stomach with tip curled in the fundus back towards the gastroesophageal junction. The lungs are clear and well expanded. Heart size is stable. Pulmonary vasculature are nondilated. No pleural effusion or pneumothorax. 1. Endotracheal tube is deep, tip essentially touching the krysta. Lungs are clear and well expanded. I would recommend 2-3 cm withdrawal for a more optimal positioning. The results of this exam were discussed with Reyna Lovelace (ICU nursing) on 4/24/2022 at 1640 hours Signed by Dr Rashid Hodges    XR CHEST PORTABLE    Result Date: 4/24/2022  XR CHEST PORTABLE 4/24/2022 1:17 PM HISTORY: ET tube placement  Technique: Single AP view of the chest COMPARISONS: 4/24/2022 FINDINGS: Status post endotracheal intubation. ET tube is in good position. Lungs are well-expanded. Enteric tube curls on itself in the midesophagus and then extends back up into the throat. Heart size is stable. Pulmonary vasculature are nondilated. 1. ET tube is in good position. Lungs are clear and well expanded. 2. Enteric tube curls on itself in the mid esophagus before extending back up into the throat. This will need repositioned. The results of this exam were discussed with Dr. Bj Dumont on 4/24/2022 at 1329 hours Signed by Dr Rashid Hodges    XR CHEST PORTABLE    Result Date: 4/24/2022  XR CHEST PORTABLE 4/24/2022 12:34 PM HISTORY: Altered mental status  Technique: Single AP view of the chest COMPARISONS: Chest exam dated 4/1/2022 FINDINGS: No lung consolidation. No pleural effusion or pneumothorax. Cardiomediastinal silhouette and pulmonary vasculature are unremarkable. No acute bony abnormality. Left subclavian region vascular stent with additional stent projecting over the left humerus. No acute cardiopulmonary process.  Signed by Dr Rashid Hodges    VL DUP CAROTID BILATERAL    Result Date: 4/29/2022  Vascular Carotid Procedure  Demographics   Patient Name    Issa Andrade Age                   47   Patient Number  [id number]       Gender                Female   Visit Number    [id number]    Interpreting          Guillermina Espino                               Physician   Date of Birth   1970   Referring Physician   Vic Florian   Accession       [id number]   [address], [place], [address]  Number  Procedure Type of Study:   Cerebral:Carotid, VL CAROTID BILATERAL. Indications for Study:Stroke. Risk Factors   - The patient's risk factor(s) include: diabetes mellitus and arterial     hypertension.   - Current - Every day. Allergies   - Penicillins.   - Stadol.   - Other allergy:(Lamisil Advanced). Technical Quality:Limited visualization due to patient on ventilator. Impression   There is mixed plaque visualized in the bilateral internal carotid  artery(ies). There is less than 50% stenosis in the right internal carotid artery. There is less than 50% stenosis of the left internal carotid artery. There is normal antegrade flow in the bilateral vertebral artery(ies). right side is very limited due to positioning, her chin is resting on her  right clavicle and she is vented. Signature   ----------------------------------------------------------------  Electronically signed by Spencer Leon(Interpreting  physician) on 04/29/2022 03:20 PM  ----------------------------------------------------------------  Blood Pressure:Right arm 178/69 mmHg. Velocities are measured in cm/s ; Diameters are measured in mm Carotid Right Measurements +------------+-------+-------+--------+-------+------------+---------------+ ! Location    ! PSV    ! EDV    ! Angle   ! RI     !%Stenosis   ! Tortuosity     ! +------------+-------+-------+--------+-------+------------+---------------+ ! Prox CCA    !47.2   !       !60      !       !            !               ! +------------+-------+-------+--------+-------+------------+---------------+ !Mid CCA     !47.2   !       !60      !       !            !               ! +------------+-------+-------+--------+-------+------------+---------------+ ! Prox ICA    !41     !7.46   !60      !0.82   !            !               ! +------------+-------+-------+--------+-------+------------+---------------+ ! Mid ICA     !62.7   !11.2   !60      !0.82   !            !               ! +------------+-------+-------+--------+-------+------------+---------------+ ! Dist ICA    !75.2   !13.7   !60      !0.82   !            !               ! +------------+-------+-------+--------+-------+------------+---------------+ ! Prox ECA    !147    !       !61      !       !            !               ! +------------+-------+-------+--------+-------+------------+---------------+ ! Vertebral   !47.2   !13.7   !60      !0.71   !            !               ! +------------+-------+-------+--------+-------+------------+---------------+   - There is antegrade vertebral flow noted on the right side. - Additional Measurements:ICAPSV/CCAPSV 1.59. Carotid Left Measurements +------------+-------+-------+--------+-------+------------+---------------+ ! Location    ! PSV    ! EDV    ! Angle   ! RI     !%Stenosis   ! Tortuosity     ! +------------+-------+-------+--------+-------+------------+---------------+ ! Prox CCA    !81.4   !9.94   !60      !0.88   !            !               ! +------------+-------+-------+--------+-------+------------+---------------+ ! Mid CCA     !70.2   !7.46   !60      !0.89   !            !               ! +------------+-------+-------+--------+-------+------------+---------------+ ! Prox ICA    !69.4   !11.9   !60      !0.83   !            !               ! +------------+-------+-------+--------+-------+------------+---------------+ ! Mid ICA     !80.6   !19.6   !60      !0.76   !            !               ! +------------+-------+-------+--------+-------+------------+---------------+ ! Dist ICA    !70.3   !12.5   ! 48 ischemia and transependymal CSF flow. The gradient recalled imaging sequence show normal flow void in the visualized intracranial vessels, particularly normal flow in the limited visualized left middle cerebral artery. There is mucosal thickening involving the ethmoid sinuses. The bilateral mastoid effusion. 1. Acute left middle cerebral territory infarct. 2. Chronic ischemic and atrophic changes. 3. The symmetrical mesial temporal atrophy is probably a part of the generalized volume loss. 4. Ethmoid sinusitis. Bilateral mastoid effusion. The above report was immediately called to the nurse in charge of the patient in CCU. Signed by Dr Denisse Rooney    [facility]    Result Date: 4/30/2022  Transthoracic Echocardiography Report (TTE)  Demographics   Patient Name  [facility] Date of Study          04/29/2022   MRN           [id number]       Gender                 Female   Date of Birth 1970   Room Number            MHL-0702   Age           47 year(s)   Height:       66 inches    Referring Physician    Johnna Germain   Weight:       144 pounds   Sonographer            Jessie Madera RDCS   BSA:          1.74 m^2     Interpreting Physician James Hansen MD   BMI:          23.24 kg/m^2  Procedure Type of Study   TTE procedure:ECHO NO CONTRAST WITH DOP/COLR. Study Location: Portable Technical Quality: Adequate visualization Patient Status: Inpatient Contrast Medium: Bubble Study. Rhythm: Within normal limits HR: 57 bpm BP: 178/69 mmHg Indications:Stroke. Conclusions   Summary  Mitral valve leaflets are mildly thickened with preserved leaflet  mobility. Mild aortic stenosis. Mean gradient 14 mm hg  Tricuspid valve is structurally normal.  Mild tricuspid regurgitation. Moderately dilated left atrium. Normal intact intra-atrial septum was noted with no evidence of  significant intra-atrial communications by color flow Doppler or by  agitated saline study.   Normal left ventricular size with preserved LV function and an estimated  ejection fraction of approximately 55-60%. Moderate concentric left ventricular hypertrophy. No regional wall motion abnormalities. Grade II diastolic dysfunction   Signature   ----------------------------------------------------------------  Electronically signed by Marian Garzon MD(Interpreting  physician) on 04/30/2022 11:02 AM  ----------------------------------------------------------------   Findings   Mitral Valve  Mitral valve leaflets are mildly thickened with preserved leaflet  mobility. Aortic Valve  Mild aortic stenosis. Mean gradient 14 mm hg   Tricuspid Valve  Tricuspid valve is structurally normal.  Mild tricuspid regurgitation. Pulmonic Valve  The pulmonic valve was not well visualized . Left Atrium  Moderately dilated left atrium. Normal intact intra-atrial septum was noted with no evidence of  significant intra-atrial communications by color flow Doppler or by  agitated saline study. Left Ventricle  Normal left ventricular size with preserved LV function and an estimated  ejection fraction of approximately 55-60%. Moderate concentric left ventricular hypertrophy. No regional wall motion abnormalities. Grade II diastolic dysfunction   Right Atrium  Normal right atrial dimension with no evidence of thrombus or mass noted. Right Ventricle  Normal right ventricular size with preserved RV function. Pericardial Effusion  No evidence of significant pericardial effusion is noted. Pleural Effusion  No evidence of pleural effusion. Allergies   - Penicillins.   - Stadol.   - Other allergy:(Lamisil Advanced). 2D Measurements and Calculations(cm)   LVIDd: 4.4 cm                       LVIDs: 3.14 cm  IVSd: 1.41 cm  LVPWd: 1.43 cm                      AO Root Dimension: 2.4 cm  Rt. Vent.  Dimension: 2.64 cm        LA Dimension: 3.8 cm  % Ejection Fraction: 55 %           LA Area: 24.3 cm^2  LA Volume: 86.1 ml                  LV Systolic dimension: 3.14cm  LA Volume Index: 49 ml/m^2          LV PW diastolic: 4.72UE  LV dimension: 4.4 cm                LVOT diameter: 2 cm                                      RV Diastolic dimension: 6.32YK  LVEDV: 115 ml                       LVEDVI: 66 ml/m^2  LVESV:36.3 ml                       LVESVI: 21 ml/m^2  Cardic Output (CO): 5.53l/min  Doppler Measurements and Calculations   AV Peak Velocity:257 cm/s              MV Peak E-Wave: 82.3 cm/s  AV Mean Velocity:178 cm/s              MV Peak A-Wave: 76.7 cm/s  AV Peak Gradient: 26.42 mmHg           MV E/A Ratio: 1.07 %  AV Mean Gradient: 14 mmHg              MV Mean velocity: 64.8cm/s  AV Area (Continuity):1.85 cm^2         MV Peak Gradient: 2.71 mmHg  AV VTI:52.4 cm/s                       MV Mean gradient: 2 mmHg  TR Velocity:207 cm/s  TR Gradient:17.14 mmHg   MV E' septal velocity: 5.66cm/s  MV E' lateral velocity:5.66 cm/s         Assessment   ESRD  DM2 with nephropathy on long-term insulin, uncontrolled  HTN  Hyponatremia  Hypokalemia  Secondary Hyperparathyroidism  Anemia CKD  Seizure disorder  Hypothyroidism       Plan:  Dialysis per routine scheduling   Ultrafiltration as tolerated  Wean ventilator per primary  Monitor labs  Neuro evaluation reviewed  Work-up reviewed to date, plans for PEG and tracheostomy noted now next week    Morales Noble MD  05/06/22  12:40 PM

## 2022-05-07 LAB
ACTIVATED PROTEIN C RESISTANCE: 2.59
ALBUMIN SERPL-MCNC: 3.2 G/DL (ref 3.5–5.2)
ALP BLD-CCNC: 104 U/L (ref 35–104)
ALT SERPL-CCNC: 7 U/L (ref 5–33)
ANION GAP SERPL CALCULATED.3IONS-SCNC: 19 MMOL/L (ref 7–19)
ANTICARDIOLIPIN IGG ANTIBODY: <10 GPL
ANTITHROMBIN ACTIVITY: 93 % (ref 76–128)
AST SERPL-CCNC: 15 U/L (ref 5–32)
BASOPHILS ABSOLUTE: 0 K/UL (ref 0–0.2)
BASOPHILS RELATIVE PERCENT: 0.1 % (ref 0–1)
BETA-2 GLYCOPROTEIN 1 IGG ANTIBODY: <10 SGU
BETA-2 GLYCOPROTEIN 1 IGM ANTIBODY: <10 SMU
BILIRUB SERPL-MCNC: <0.2 MG/DL (ref 0.2–1.2)
BUN BLDV-MCNC: 34 MG/DL (ref 6–20)
CALCIUM SERPL-MCNC: 9 MG/DL (ref 8.6–10)
CARDIOLIPIN AB IGM: <10 MPL
CHLORIDE BLD-SCNC: 88 MMOL/L (ref 98–111)
CO2: 28 MMOL/L (ref 22–29)
CREAT SERPL-MCNC: 2.4 MG/DL (ref 0.5–0.9)
DRVVT CONFIRMATION TEST: ABNORMAL RATIO
DRVVT SCREEN: 33 SEC (ref 33–44)
DRVVT,DIL: ABNORMAL SEC (ref 33–44)
EOSINOPHILS ABSOLUTE: 0 K/UL (ref 0–0.6)
EOSINOPHILS RELATIVE PERCENT: 0 % (ref 0–5)
FACTOR V LEIDEN: ABNORMAL
FACV SPECIMEN: ABNORMAL
GFR AFRICAN AMERICAN: 26
GFR NON-AFRICAN AMERICAN: 21
GLUCOSE BLD-MCNC: 106 MG/DL (ref 70–99)
GLUCOSE BLD-MCNC: 167 MG/DL (ref 70–99)
GLUCOSE BLD-MCNC: 170 MG/DL (ref 74–109)
GLUCOSE BLD-MCNC: 179 MG/DL (ref 70–99)
GLUCOSE BLD-MCNC: 87 MG/DL (ref 70–99)
HCT VFR BLD CALC: 34.2 % (ref 37–47)
HEMOGLOBIN: 10.3 G/DL (ref 12–16)
HEXAGONAL PHOSPHOLIPID NEUTRALIZAT TEST: ABNORMAL
HOMOCYSTEINE: 17 UMOL/L (ref 0–15)
IMMATURE GRANULOCYTES #: 0.1 K/UL
INR BLD: 1.02 (ref 0.88–1.18)
LUPUS ANTICOAG INTERP: ABNORMAL
LYMPHOCYTES ABSOLUTE: 0.7 K/UL (ref 1.1–4.5)
LYMPHOCYTES RELATIVE PERCENT: 5.3 % (ref 20–40)
MAGNESIUM: 2.3 MG/DL (ref 1.6–2.6)
MCH RBC QN AUTO: 28.1 PG (ref 27–31)
MCHC RBC AUTO-ENTMCNC: 30.1 G/DL (ref 33–37)
MCV RBC AUTO: 93.4 FL (ref 81–99)
MONOCYTES ABSOLUTE: 0.7 K/UL (ref 0–0.9)
MONOCYTES RELATIVE PERCENT: 4.8 % (ref 0–10)
NEUTROPHILS ABSOLUTE: 12.6 K/UL (ref 1.5–7.5)
NEUTROPHILS RELATIVE PERCENT: 89.4 % (ref 50–65)
PDW BLD-RTO: 18.6 % (ref 11.5–14.5)
PERFORMED ON: ABNORMAL
PERFORMED ON: NORMAL
PHOSPHORUS: 4.9 MG/DL (ref 2.5–4.5)
PLATELET # BLD: 261 K/UL (ref 130–400)
PLT NEUTA: ABNORMAL
PMV BLD AUTO: 9.4 FL (ref 9.4–12.3)
POTASSIUM SERPL-SCNC: 4 MMOL/L (ref 3.5–5)
PROTEIN C FUNCTIONAL: 122 % (ref 83–168)
PROTEIN S ANTIGEN, FREE: 120 % (ref 55–123)
PROTHROMBIN G20210A MUTATION: NEGATIVE
PROTHROMBIN TIME: 13.3 SEC (ref 12–14.6)
PT D: 14.1 SEC (ref 12–15.5)
PT PCR SPECIMEN: ABNORMAL
PTT D: 50 SEC (ref 32–48)
PTT-D CORR REFLEX: 46 SEC (ref 32–48)
PTT-HEPARIN NEUTRALIZED: ABNORMAL SEC (ref 32–48)
RBC # BLD: 3.66 M/UL (ref 4.2–5.4)
REPTILASE TIME: ABNORMAL SEC
SODIUM BLD-SCNC: 135 MMOL/L (ref 136–145)
THROMBIN TIME: 16.6 SEC (ref 14.7–19.5)
THROMBOSIS INTERPRETATION: ABNORMAL
TOTAL PROTEIN: 6.4 G/DL (ref 6.6–8.7)
WBC # BLD: 14 K/UL (ref 4.8–10.8)

## 2022-05-07 PROCEDURE — 2100000000 HC CCU R&B

## 2022-05-07 PROCEDURE — 6370000000 HC RX 637 (ALT 250 FOR IP): Performed by: INTERNAL MEDICINE

## 2022-05-07 PROCEDURE — 6360000002 HC RX W HCPCS: Performed by: INTERNAL MEDICINE

## 2022-05-07 PROCEDURE — 2700000000 HC OXYGEN THERAPY PER DAY

## 2022-05-07 PROCEDURE — 80053 COMPREHEN METABOLIC PANEL: CPT

## 2022-05-07 PROCEDURE — 83735 ASSAY OF MAGNESIUM: CPT

## 2022-05-07 PROCEDURE — 94003 VENT MGMT INPAT SUBQ DAY: CPT

## 2022-05-07 PROCEDURE — 85610 PROTHROMBIN TIME: CPT

## 2022-05-07 PROCEDURE — 85025 COMPLETE CBC W/AUTO DIFF WBC: CPT

## 2022-05-07 PROCEDURE — 36415 COLL VENOUS BLD VENIPUNCTURE: CPT

## 2022-05-07 PROCEDURE — 84100 ASSAY OF PHOSPHORUS: CPT

## 2022-05-07 PROCEDURE — 94640 AIRWAY INHALATION TREATMENT: CPT

## 2022-05-07 PROCEDURE — 99232 SBSQ HOSP IP/OBS MODERATE 35: CPT | Performed by: PSYCHIATRY & NEUROLOGY

## 2022-05-07 PROCEDURE — 2580000003 HC RX 258: Performed by: INTERNAL MEDICINE

## 2022-05-07 PROCEDURE — 82947 ASSAY GLUCOSE BLOOD QUANT: CPT

## 2022-05-07 PROCEDURE — 2500000003 HC RX 250 WO HCPCS: Performed by: INTERNAL MEDICINE

## 2022-05-07 RX ADMIN — LEVETIRACETAM 500 MG: 5 INJECTION INTRAVENOUS at 01:18

## 2022-05-07 RX ADMIN — HYDRALAZINE HYDROCHLORIDE 100 MG: 50 TABLET, FILM COATED ORAL at 14:46

## 2022-05-07 RX ADMIN — IPRATROPIUM BROMIDE AND ALBUTEROL SULFATE 1 AMPULE: 2.5; .5 SOLUTION RESPIRATORY (INHALATION) at 18:05

## 2022-05-07 RX ADMIN — METHYLPREDNISOLONE SODIUM SUCCINATE 40 MG: 40 INJECTION, POWDER, FOR SOLUTION INTRAMUSCULAR; INTRAVENOUS at 21:38

## 2022-05-07 RX ADMIN — HEPARIN SODIUM 5000 UNITS: 5000 INJECTION INTRAVENOUS; SUBCUTANEOUS at 21:37

## 2022-05-07 RX ADMIN — METHYLPREDNISOLONE SODIUM SUCCINATE 40 MG: 40 INJECTION, POWDER, FOR SOLUTION INTRAMUSCULAR; INTRAVENOUS at 14:46

## 2022-05-07 RX ADMIN — ACETYLCYSTEINE 600 MG: 200 SOLUTION ORAL; RESPIRATORY (INHALATION) at 18:05

## 2022-05-07 RX ADMIN — HEPARIN SODIUM 5000 UNITS: 5000 INJECTION INTRAVENOUS; SUBCUTANEOUS at 10:18

## 2022-05-07 RX ADMIN — Medication 10 ML: at 21:38

## 2022-05-07 RX ADMIN — INSULIN LISPRO 2 UNITS: 100 INJECTION, SOLUTION INTRAVENOUS; SUBCUTANEOUS at 12:44

## 2022-05-07 RX ADMIN — INSULIN GLARGINE 20 UNITS: 100 INJECTION, SOLUTION SUBCUTANEOUS at 10:19

## 2022-05-07 RX ADMIN — IPRATROPIUM BROMIDE AND ALBUTEROL SULFATE 1 AMPULE: 2.5; .5 SOLUTION RESPIRATORY (INHALATION) at 14:15

## 2022-05-07 RX ADMIN — SODIUM CHLORIDE, PRESERVATIVE FREE 20 MG: 5 INJECTION INTRAVENOUS at 10:18

## 2022-05-07 RX ADMIN — IPRATROPIUM BROMIDE AND ALBUTEROL SULFATE 1 AMPULE: 2.5; .5 SOLUTION RESPIRATORY (INHALATION) at 01:48

## 2022-05-07 RX ADMIN — INSULIN GLARGINE 20 UNITS: 100 INJECTION, SOLUTION SUBCUTANEOUS at 21:37

## 2022-05-07 RX ADMIN — PROPOFOL 25 MCG/KG/MIN: 10 INJECTION, EMULSION INTRAVENOUS at 16:05

## 2022-05-07 RX ADMIN — ANORECTAL OINTMENT: 15.7; .44; 24; 20.6 OINTMENT TOPICAL at 04:00

## 2022-05-07 RX ADMIN — ASPIRIN 81 MG 81 MG: 81 TABLET ORAL at 10:18

## 2022-05-07 RX ADMIN — VANCOMYCIN HYDROCHLORIDE 125 MG: 1 INJECTION, POWDER, LYOPHILIZED, FOR SOLUTION INTRAVENOUS at 17:55

## 2022-05-07 RX ADMIN — VANCOMYCIN HYDROCHLORIDE 125 MG: 1 INJECTION, POWDER, LYOPHILIZED, FOR SOLUTION INTRAVENOUS at 12:35

## 2022-05-07 RX ADMIN — PROPOFOL 50 MCG/KG/MIN: 10 INJECTION, EMULSION INTRAVENOUS at 01:40

## 2022-05-07 RX ADMIN — METHYLPREDNISOLONE SODIUM SUCCINATE 40 MG: 40 INJECTION, POWDER, FOR SOLUTION INTRAMUSCULAR; INTRAVENOUS at 06:05

## 2022-05-07 RX ADMIN — ANORECTAL OINTMENT: 15.7; .44; 24; 20.6 OINTMENT TOPICAL at 17:55

## 2022-05-07 RX ADMIN — VANCOMYCIN HYDROCHLORIDE 125 MG: 1 INJECTION, POWDER, LYOPHILIZED, FOR SOLUTION INTRAVENOUS at 06:05

## 2022-05-07 RX ADMIN — LEVETIRACETAM 500 MG: 5 INJECTION INTRAVENOUS at 12:38

## 2022-05-07 RX ADMIN — HYDRALAZINE HYDROCHLORIDE 100 MG: 50 TABLET, FILM COATED ORAL at 21:37

## 2022-05-07 RX ADMIN — ACETYLCYSTEINE 600 MG: 200 SOLUTION ORAL; RESPIRATORY (INHALATION) at 06:19

## 2022-05-07 RX ADMIN — HEPARIN SODIUM 5000 UNITS: 5000 INJECTION INTRAVENOUS; SUBCUTANEOUS at 14:46

## 2022-05-07 RX ADMIN — IPRATROPIUM BROMIDE AND ALBUTEROL SULFATE 1 AMPULE: 2.5; .5 SOLUTION RESPIRATORY (INHALATION) at 21:50

## 2022-05-07 RX ADMIN — Medication 10 ML: at 12:35

## 2022-05-07 RX ADMIN — CHLORHEXIDINE GLUCONATE 15 ML: 1.2 RINSE ORAL at 21:37

## 2022-05-07 RX ADMIN — IPRATROPIUM BROMIDE AND ALBUTEROL SULFATE 1 AMPULE: 2.5; .5 SOLUTION RESPIRATORY (INHALATION) at 10:21

## 2022-05-07 RX ADMIN — IPRATROPIUM BROMIDE AND ALBUTEROL SULFATE 1 AMPULE: 2.5; .5 SOLUTION RESPIRATORY (INHALATION) at 06:19

## 2022-05-07 RX ADMIN — CHLORHEXIDINE GLUCONATE 15 ML: 1.2 RINSE ORAL at 09:00

## 2022-05-07 RX ADMIN — INSULIN LISPRO 2 UNITS: 100 INJECTION, SOLUTION INTRAVENOUS; SUBCUTANEOUS at 10:18

## 2022-05-07 RX ADMIN — VANCOMYCIN HYDROCHLORIDE 125 MG: 1 INJECTION, POWDER, LYOPHILIZED, FOR SOLUTION INTRAVENOUS at 00:21

## 2022-05-07 RX ADMIN — PROPOFOL 50 MCG/KG/MIN: 10 INJECTION, EMULSION INTRAVENOUS at 07:37

## 2022-05-07 ASSESSMENT — PULMONARY FUNCTION TESTS
PIF_VALUE: 17
PIF_VALUE: 18
PIF_VALUE: 17
PIF_VALUE: 21
PIF_VALUE: 17
PIF_VALUE: 17
PIF_VALUE: 16
PIF_VALUE: 21
PIF_VALUE: 17
PIF_VALUE: 16
PIF_VALUE: 17
PIF_VALUE: 18
PIF_VALUE: 18
PIF_VALUE: 16
PIF_VALUE: 18
PIF_VALUE: 17
PIF_VALUE: 23
PIF_VALUE: 16
PIF_VALUE: 20
PIF_VALUE: 17
PIF_VALUE: 18
PIF_VALUE: 17

## 2022-05-07 ASSESSMENT — PAIN SCALES - GENERAL
PAINLEVEL_OUTOF10: 0

## 2022-05-07 NOTE — PROGRESS NOTES
Comprehensive Nutrition Assessment    Type and Reason for Visit:  Reassess    Nutrition Recommendations/Plan:   1. Decrease TF rate to 41ml.hr d/t increased Propofol rate     Malnutrition Assessment:  Malnutrition Status:  Severe malnutrition (05/07/22 1010)    Context:  Acute Illness     Findings of the 6 clinical characteristics of malnutrition:  Energy Intake:  Mild decrease in energy intake (Comment)  Weight Loss:  Greater than 2% over 1 week     Body Fat Loss: Moderate body fat loss Orbital,Buccal region   Muscle Mass Loss: Moderate muscle mass loss Thigh (quadraceps),Temples (temporalis)  Fluid Accumulation:  No significant fluid accumulation Extremities   Strength:  Not Performed    Nutrition Assessment:    Propofol 17.9ml.  TF has been resumed and is currently at goal rate of 47ml/hr. Residuals are ranging between 30-150ml. Aware consent has been obtained for trach and PEG. Dialysis continues    Nutrition Related Findings:    intubated, dialysis, Wound Type: Deep Tissue Injury       Current Nutrition Intake & Therapies:    Average Meal Intake: NPO  Average Supplements Intake: NPO  Current Tube Feeding (TF) Orders:  · Feeding Route: Orogastric  · Formula: Peptide Based High Protein  · Schedule: Continuous  · Feeding Regimen: Change TF rate to Vital High Protein @ 42ml/hr  · Additives/Modulars: None  · Water Flushes: none  · Current TF & Flush Orders Provides: Vital High Protein @47ml/hr = 1128 kcals with 98g protein, 123g CHO and 945ml free water from formula. · Goal TF & Flush Orders Provides: Vital High Protein @ 41ml/hr = 984 kcals with 86g protein, 109g CHO and 822ml free water from formula. Propofol provides another 472 NP kcals      Anthropometric Measures:  Height: 5' 6\" (167.6 cm)  Ideal Body Weight (IBW): 130 lbs (59 kg)    Admission Body Weight: 165 lb (74.8 kg) (estimated)  Current Body Weight: 130 lb 3.2 oz (59.1 kg), 100.2 % IBW.  Weight Source: Bed Scale  Current BMI (kg/m2): 21  Usual Body Weight: 144 lb (65.3 kg) (1/2022)  % Weight Change (Calculated): 0.5  BMI Categories: Normal Weight (BMI 18.5-24. 9)    Estimated Daily Nutrient Needs:  Energy Requirements Based On: Kcal/kg  Weight Used for Energy Requirements: Current  Energy (kcal/day): 4574-8584 kcals (20-25 kcals/kg)  Weight Used for Protein Requirements: Current  Protein (g/day): 90g  Method Used for Fluid Requirements: 1 ml/kcal  Fluid (ml/day): 2175-7035 ml    Nutrition Diagnosis:   · Inadequate oral intake related to acute injury/trauma,impaired respiratory function as evidenced by NPO or clear liquid status due to medical condition,intubation,nutrition support - enteral nutrition      Nutrition Interventions:   Food and/or Nutrient Delivery: Continue NPO,Continue Current Tube Feeding  Nutrition Education/Counseling: No recommendation at this time,Education not indicated  Coordination of Nutrition Care: Continue to monitor while inpatient  Plan of Care discussed with: nursing    Goals:  Previous Goal Met: Progressing toward Goal(s)  Goals: Meet at least 75% of estimated needs       Nutrition Monitoring and Evaluation:   Behavioral-Environmental Outcomes: None Identified  Food/Nutrient Intake Outcomes: Enteral Nutrition Intake/Tolerance  Physical Signs/Symptoms Outcomes: Biochemical Data,Fluid Status or Edema,Weight,Skin,Nutrition Focused Physical Findings    Discharge Planning:     Too soon to determine     Anthony Morrison MS, RD, LD  Contact: [phone]

## 2022-05-07 NOTE — PROGRESS NOTES
Nephrology ([facility]) Progress Note    Patient:  Carloz Kemp  YOB: 1970  Date of Service: 5/7/2022  MRN: [id number]   Acct: [de-identified]   Primary Care Physician: ROMINA Tim  Advance Directive: Full Code  Admit Date: 4/24/2022       Hospital Day: 13  Referring Provider: Sherly Khan DO    Patient independently seen and examined, Chart, Consults, Notes, Operative notes, Labs, Cardiology, and Radiology studies reviewed as available. Subjective:  Carloz Kemp is a 47 y.o. female for whom we were consulted for evaluation and treatment of end-stage renal disease. Patient also has a history of seizure disorder, type 2 diabetes, hypertension, frequent hospitalization with noncompliance and poorly controlled diabetes. Patient was brought to the emergency room after she was found to have unresponsiveness and witnessed grand mal seizure. She was brought in by ambulance. On arrival in the emergency room, she was found to be severely hyperglycemic and had a urinary tract infection. Patient was intubated, sedated and treated by neurology for grand mal seizure. She was also receiving IV antibiotics and insulin drip. Her blood sugar control had significantly improved. On April 25, she has received emergent hemodialysis treatment for correction of volume status as well as hyponatremia. She was moved from ICU to CCU. Today, patient remains on the ventilator and unable to participate in the history and physical examination. Events reviewed with nursing at the bedside. No family present. No issues with dialysis yesterday.       Allergies:  Penicillins, Lamisil advanced [terbinafine], Reglan [metoclopramide], and Stadol [butorphanol]    Medicines:  Current Facility-Administered Medications   Medication Dose Route Frequency Provider Last Rate Last Admin    acetylcysteine (MUCOMYST) 20 % solution 600 mg  600 mg Inhalation BID Sherly Khan DO   600 mg at 05/07/22 0619    ipratropium-albuterol (DUONEB) nebulizer solution 1 ampule  1 ampule Inhalation Q4H Fannie Shepherd, DO   1 ampule at 05/07/22 1021    insulin glargine (LANTUS) injection vial 20 Units  20 Units SubCUTAneous BID Fannie Shepherd, DO   20 Units at 05/07/22 1019    hydrALAZINE (APRESOLINE) injection 10 mg  10 mg IntraVENous Q6H PRN Andre Schrader MD   10 mg at 05/06/22 0940    aspirin chewable tablet 81 mg  81 mg Oral Daily Andre Schrader MD   81 mg at 05/07/22 1018    racepinephrine HCl (VAPONEFPRIN) 2.25 % nebulizer solution NEBU 11.25 mg  11.25 mg Nebulization Q4H PRN Andre Schrader MD   11.25 mg at 05/03/22 1408    sodium chloride nebulizer 0.9 % solution 3 mL  3 mL Nebulization Q4H PRN Andre Schrader MD        methylPREDNISolone sodium (SOLU-MEDROL) injection 40 mg  40 mg IntraVENous Q8H Andre Schrader MD   40 mg at 05/07/22 7940    propofol injection  5-50 mcg/kg/min IntraVENous Continuous Andre Schrader MD 17.9 mL/hr at 05/07/22 0737 50 mcg/kg/min at 05/07/22 0737    heparin (porcine) injection 5,000 Units  5,000 Units SubCUTAneous TID Andre Schrader MD   5,000 Units at 05/07/22 1018    Peppermint Spirit SPRT   Does not apply PRN Fannie Shepherd, DO        dexmedetomidine (PRECEDEX) 400 mcg in sodium chloride 0.9 % 100 mL infusion  0.1-1.5 mcg/kg/hr IntraVENous Continuous Fannie Shepherd, DO   Stopped at 05/03/22 1143    menthol-zinc oxide (CALMOSEPTINE) 0.44-20.6 % ointment   Topical BID Andre Schrader MD   Given at 05/07/22 0400    sodium phosphate 10.5 mmol in sodium chloride 0.9 % 250 mL IVPB  0.16 mmol/kg IntraVENous PRN Patience Walter, DO        Or    sodium phosphate 21 mmol in sodium chloride 0.9 % 250 mL IVPB  0.32 mmol/kg IntraVENous PRN Patience Walter, DO        insulin lispro (HUMALOG) injection vial 0-12 Units  0-12 Units SubCUTAneous TID GIANNA Romero DO   2 Units at 05/07/22 1244    insulin lispro (HUMALOG) injection vial 0-6 Units  0-6 Units SubCUTAneous Nightly Francis G. L. Garcia, DO   1 Units at 05/06/22 2129    vancomycin (VANCOCIN) oral solution 125 mg  125 mg Oral 4 times per day Francis Kings, DO   125 mg at 05/07/22 1235    hydrALAZINE (APRESOLINE) tablet 100 mg  100 mg Oral 3 times per day Patience Walter, DO   100 mg at 05/06/22 1011    glucagon (rDNA) injection 1 mg  1 mg IntraMUSCular PRN Francis Kings, DO        dextrose 5 % solution  100 mL/hr IntraVENous PRN Francis Kings, DO        glucose chewable tablet 16 g  4 tablet Oral PRN Francis Kings, DO        dextrose bolus (hypoglycemia) 10% 125 mL  125 mL IntraVENous PRN Francis Kings, DO   Stopped at 04/25/22 6958    Or    dextrose bolus (hypoglycemia) 10% 250 mL  250 mL IntraVENous PRN Francis Kings, DO        sodium chloride flush 0.9 % injection 5-40 mL  5-40 mL IntraVENous 2 times per day Francis Kings, DO   10 mL at 05/07/22 1235    sodium chloride flush 0.9 % injection 5-40 mL  5-40 mL IntraVENous PRN Francis Kings, DO        0.9 % sodium chloride infusion   IntraVENous PRN Francis Kings, DO   Stopped at 05/03/22 2005    LORazepam (ATIVAN) injection 1 mg  1 mg IntraVENous Q5 Min PRN Francis Kings, DO        ondansetron (ZOFRAN-ODT) disintegrating tablet 4 mg  4 mg Oral Q8H PRN Francis Kings, DO        Or    ondansetron [facility]) injection 4 mg  4 mg IntraVENous Q6H PRN Francis Kings, DO        polyethylene glycol [facility]) packet 17 g  17 g Oral Daily PRN Francis Kings, DO        acetaminophen (TYLENOL) tablet 650 mg  650 mg Oral Q6H PRN Francis Kings, DO   650 mg at 04/25/22 7012    Or    acetaminophen (TYLENOL) suppository 650 mg  650 mg Rectal Q6H PRN Francis Kings, DO        levETIRAcetam (KEPPRA) 500 mg/100 mL IVPB  500 mg IntraVENous Q12H Francis Kings, DO   Stopped at 05/07/22 1252    chlorhexidine (PERIDEX) 0.12 % solution 15 mL  15 mL Mouth/Throat BID Leilani Pope Lizy Otter, DO   15 mL at 05/07/22 0900    famotidine (PEPCID) 20 mg in sodium chloride (PF) 10 mL injection  20 mg IntraVENous Daily Tristian Clines, DO   20 mg at 05/07/22 1018    magic butt cream   Topical Q4H PRN Tristian Clines, DO   Given at 05/03/22 0940    magnesium sulfate 1000 mg in dextrose 5% 100 mL IVPB  1,000 mg IntraVENous PRN Patience Walter, DO        potassium bicarb-citric acid (EFFER-K) effervescent tablet 40 mEq  40 mEq Oral PRN Patience Walter, DO        Or    potassium chloride 10 mEq/100 mL IVPB (Peripheral Line)  10 mEq IntraVENous PRN Kasandra Stanford, DO   Stopped at 04/25/22 0657       Past Medical History:  Past Medical History:   Diagnosis Date    [facility] 4/30/2022    Arthritis     Chronic kidney disease, stage 5, kidney failure (HCC)     Closed fracture of right shoulder girdle, with routine healing, subsequent encounter     DM (diabetes mellitus) type II controlled with renal manifestation ([facility] 06/1993    Gastroparesis     GERD (gastroesophageal reflux disease)     Hemodialysis patient ([facility]     [facility]    Hypertension     Hypothyroid     Nasal congestion     recent    Neuropathy     Noncompliance with medications     Palliative care patient 10/08/2019    Restless leg syndrome        Past Surgical History:  Past Surgical History:   Procedure Laterality Date    BREAST SURGERY Left 2008    infected milk gland removed    BREAST SURGERY Right 5/25/2021    WEDGE EXCISION RIGHT BREAST DUCT WITH LACRIMAL DUCT PROBE, PEC BLOCK performed by Elizabeth Carballo MD at [address] [place] [address], LAPAROSCOPIC      2008    COLONOSCOPY Left 9/17/2020    Dr Duane Azul hepatic flexure-Severe tortuosity with severe spasming, 1 yr recall    DIALYSIS FISTULA CREATION Left 1/25/2019    REVISION LEFT UPPER EXTREMITY AV ACCESS WITH LEFT BRACHIAL ARTERY TO LEFT AXILLARY VEIN WITH  INTERPOSITIONAL ARTEGRAFT performed by Joshua Dos Santos MD at [address]  2012    [address] Right 1/25/2019    INSERTION OF RIGHT INTERNAL JUGULAR HEMODIALYSIS CATHETER performed by Joshua Dos Santos MD at [facility]  08/17/2018    1.  Moderately increased stainable iron identified by special stain in Kupffer cells and occasional hepatocytes. Negative for evidence of significant portal or lobular inflammation. Negative for evidence of significant fibrosis    MN COLONOSCOPY W/BIOPSY SINGLE/MULTIPLE N/A 10/20/2017    Dr Jax Donato prep-Tubular AP (-) dysplasia x 2--3 yr recall    MN EGD TRANSORAL BIOPSY SINGLE/MULTIPLE N/A 12/27/2016    Dr Javier Dyson EGD TRANSORAL BIOPSY SINGLE/MULTIPLE N/A 10/20/2017    Dr Gerard Darling (-)   [facility] VASCULAR SURGERY Left 2016    fistula by Dr Dave Parham at [address]  10/02/2017    [facility]. Left upper fistulograms/venograms, balloon angioplasty cephalic vein arch 60Y51 conquest.    VASCULAR SURGERY  08/2018    FISTULOGRAM    VASCULAR SURGERY  10/29/2018    [facility]. Left upper fistulograms/venograms    VASCULAR SURGERY  12/19/2018    [facility]. Arch aortogram,left upper arteriograms.  VASCULAR SURGERY  03/06/2019    [facility]. Removal of tunneled dialyis catheter right internal jugular vein.  VASCULAR SURGERY  08/28/2019    [facility]. Left upper extremity fistulogram including venography to the superior vena cava. Balloon angioplasty left subclavian vein with 10mm x 40mm conquest balloon. Balloon angioplasty mid/proximal upper arm cephalic vein with 43EI x 40mm conquest balloon. Venograms after balloon angioplasty. Stent left mid/proximal upper arm cephalic vein stenosis fluency 10mm x 60mm self-expanding covered stent. Balloon     VASCULAR SURGERY  08/28/2019    cont angioplasty stent with 10mm x 40mm conquest balloon. Completion venograms left upper extremity.        Family History  Family History   Problem Relation Age of Onset    Colon Cancer Mother          at 61    Colon Polyps Mother     Lung Cancer Father          at 66    Colon Polyps Father     Stomach Cancer Sister     Breast Cancer Sister 27    Depression Daughter 25        committed suicide    Liver Cancer Neg Hx     Liver Disease Neg Hx     Esophageal Cancer Neg Hx     Rectal Cancer Neg Hx        Social History  Social History     Socioeconomic History    Marital status: Single     Spouse name: Not on file    Number of children: 2    Years of education: Not on file    Highest education level: Not on file   Occupational History    Not on file   Tobacco Use    Smoking status: Current Every Day Smoker     Packs/day: 0.50     Years: 33.00     Pack years: 16.50     Types: Cigarettes    Smokeless tobacco: Never Used    Tobacco comment: NOW at 1/4 PD, started at age 24 and has averaged 2 PPD   Vaping Use    Vaping Use: Never used   Substance and Sexual Activity    Alcohol use: No    Drug use: Not Currently     Types: Marijuana Margdru Stanley)    Sexual activity: Not Currently     Partners: Male   Other Topics Concern    Not on file   Social History Narrative    Not on file     Social Determinants of Health     Financial Resource Strain:     Difficulty of Paying Living Expenses: Not on file   Food Insecurity:     Worried About Running Out of Food in the Last Year: Not on file    Kendy of Food in the Last Year: Not on file   Transportation Needs:     Lack of Transportation (Medical): Not on file    Lack of Transportation (Non-Medical):  Not on file   Physical Activity:     Days of Exercise per Week: Not on file    Minutes of Exercise per Session: Not on file   Stress:     Feeling of Stress : Not on file   Social Connections:     Frequency of Communication with Friends and Family: Not on file    Frequency of Social Gatherings with Friends and Family: Not on file    Attends Congregation Services: Not on file    Active Member of Clubs or Organizations: Not on file   Neftali Attends Club or Organization Meetings: Not on file    Marital Status: Not on file   Intimate Partner Violence:     Fear of Current or Ex-Partner: Not on file    Emotionally Abused: Not on file    Physically Abused: Not on file    Sexually Abused: Not on file   Housing Stability:     Unable to Pay for Housing in the Last Year: Not on file    Number of Places Lived in the Last Year: Not on file    Unstable Housing in the Last Year: Not on file         Review of Systems:  History obtained from unobtainable from patient due to mental status          Objective:  Patient Vitals for the past 24 hrs:   BP Temp Temp src Pulse Resp SpO2 Height Weight   05/07/22 1300 (!) 144/60   96 14 100 %     05/07/22 1200 (!) 125/57   95 14 99 %     05/07/22 1100 (!) 126/56   100 14 98 %     05/07/22 1004       5' 6\" (1.676 m)    05/07/22 1000 (!) 145/62   99 14 98 %     05/07/22 0900 (!) 166/68   101 13 99 %     05/07/22 0800 (!) 160/64 98 °F (36.7 °C) Temporal 99 15 98 %     05/07/22 0700 (!) 169/64   97 14 99 %     05/07/22 0626    94 14 100 %     05/07/22 0600 (!) 144/68   95 10 99 %     05/07/22 0536        130 lb 3.2 oz (59.1 kg)   05/07/22 0500 (!) 122/53   98 14 98 %     05/07/22 0400 (!) 115/51 98 °F (36.7 °C) Temporal 97 15 98 %     05/07/22 0300 (!) 120/56   98 14 98 %     05/07/22 0200 (!) 117/52   99 14 99 %     05/07/22 0100 (!) 120/54   96 14 98 %     05/07/22 0000 (!) 121/54 98.1 °F (36.7 °C) Temporal 98 19 97 %     05/06/22 2300 (!) 125/56   96 16 97 %     05/06/22 2200 (!) 125/59   93 14 96 %     05/06/22 2128 (!) 123/55          05/06/22 2100 (!) 119/56   89 14 98 %     05/06/22 2000 (!) 130/54 97.9 °F (36.6 °C) Temporal 88 14 98 %     05/06/22 1900 (!) 131/54   87 14 100 %     05/06/22 1800 (!) 134/56   82 14 100 %     05/06/22 1600 (!) 123/58 97.3 °F (36.3 °C) Temporal 89 19 100 %     05/06/22 1500 (!) 112/50   84 14 100 %     05/06/22 1416    83 14 100 %     05/06/22 1400 (!) 116/55   79 14 99 %         Intake/Output Summary (Last 24 hours) at 5/7/2022 1336  Last data filed at 5/7/2022 0800  Gross per 24 hour   Intake 1052.13 ml   Output 10 ml   Net 1042.13 ml     General: Ventilated and unable to participate in the history and physical examination  Chest:  clear to auscultation bilaterally  CVS: regular rate and rhythm  Abdominal: soft, nontender, normal bowel sounds  Extremities: no cyanosis or edema  Skin: warm and dry without rash    Labs:  BMP:   Recent Labs     05/05/22 0135 05/06/22 0239 05/07/22  0021    136 135*   K 4.2 4.8 4.0   CL 91* 91* 88*   CO2 28 27 28   PHOS 4.9* 6.0* 4.9*   BUN 43* 65* 34*   CREATININE 2.9* 3.6* 2.4*   CALCIUM 9.1 9.0 9.0     CBC:   Recent Labs     05/05/22 0135 05/06/22 0239 05/07/22  0021   WBC 7.5 8.7 14.0*   HGB 9.9* 10.3* 10.3*   HCT 31.7* 33.9* 34.2*   MCV 91.9 93.4 93.4    237 261     LIVER PROFILE:   Recent Labs     05/05/22 0135 05/06/22 0239 05/07/22  0021   AST 13 13 15   ALT 6 6 7   BILITOT <0.2 0.3 <0.2   ALKPHOS 100 96 104     PT/INR:   Recent Labs     05/05/22 0135 05/06/22 0239 05/07/22  0021   PROTIME 13.9 13.5 13.3   INR 1.08 1.04 1.02     APTT: No results for input(s): APTT in the last 72 hours. BNP:  No results for input(s): BNP in the last 72 hours. Ionized Calcium:No results for input(s): IONCA in the last 72 hours. Magnesium:  Recent Labs     05/05/22 0135 05/06/22 0239 05/07/22  0021   MG 2.4 2.6 2.3     Phosphorus:  Recent Labs     05/05/22 0135 05/06/22  0239 05/07/22  0021   PHOS 4.9* 6.0* 4.9*     HgbA1C:   No results for input(s): LABA1C in the last 72 hours.   Hepatic:   Recent Labs     05/05/22 0135 05/06/22 0239 05/07/22  0021   ALKPHOS 100 96 104   ALT 6 6 7   AST 13 13 15   PROT 5.9* 6.0* 6.4*   BILITOT <0.2 0.3 <0.2   LABALBU 2.9* 3.0* 3.2*     Lactic Acid: No results for input(s): LACTA in the last 72 hours.  Troponin: No results for input(s): CKTOTAL, CKMB, TROPONINT in the last 72 hours. ABGs:   Lab Results   Component Value Date    PHART 7.540 05/04/2022    PO2ART 119.0 05/04/2022    PZL0ZNU 36.0 05/04/2022     CRP:  No results for input(s): CRP in the last 72 hours. Sed Rate:  No results for input(s): SEDRATE in the last 72 hours. Culture Results:   Blood Culture Recent:   Recent Labs     04/24/22  1447   BC No growth after 5 days of incubation. Urine Culture Recent :   Recent Labs     04/24/22  1354   LABURIN No growth at 36-48 hours       Radiology reports as per the Radiologist  Radiology: CT Head WO Contrast    Result Date: 4/24/2022  CT HEAD WO CONTRAST 4/24/2022 2:01 PM HISTORY: Altered mental status COMPARISON: CT scan dated 11/18/2021 DOSE LENGTH PRODUCT: 766 mGy cm TECHNIQUE: Helical tomographic images of the brain were obtained without the use of intravenous contrast. Automated exposure control was also utilized to decrease patient radiation dose. FINDINGS: There is no evidence of evolving large vascular territory infarct. No visualized intra-axial or extra-axial hemorrhage. No mass lesion is identified. Normal size and configuration of the ventricular system. The basal cisterns are symmetric. Posterior fossa structures are unremarkable. The included orbits and their contents are unremarkable. Chronic paranasal sinus disease on the left. The visualized osseous structures and overlying soft tissues of the skull and face are unremarkable. 1. No acute intracranial process. Signed by Dr Cyrus Biggs    Result Date: 4/24/2022  XR CHEST PORTABLE 4/24/2022 4:27 PM HISTORY: ET tube, enteric tube  Technique: Single AP view of the chest COMPARISONS: 4/24/2022 FINDINGS: Endotracheal tube is identified with tip essentially touching the krysta. Enteric tube courses into the stomach with tip curled in the fundus back towards the gastroesophageal junction.  The lungs are clear and well expanded. Heart size is stable. Pulmonary vasculature are nondilated. No pleural effusion or pneumothorax. 1. Endotracheal tube is deep, tip essentially touching the krysta. Lungs are clear and well expanded. I would recommend 2-3 cm withdrawal for a more optimal positioning. The results of this exam were discussed with Blake Branch (ICU nursing) on 4/24/2022 at 1640 hours Signed by Dr Allyson Napier    XR CHEST PORTABLE    Result Date: 4/24/2022  XR CHEST PORTABLE 4/24/2022 1:17 PM HISTORY: ET tube placement  Technique: Single AP view of the chest COMPARISONS: 4/24/2022 FINDINGS: Status post endotracheal intubation. ET tube is in good position. Lungs are well-expanded. Enteric tube curls on itself in the midesophagus and then extends back up into the throat. Heart size is stable. Pulmonary vasculature are nondilated. 1. ET tube is in good position. Lungs are clear and well expanded. 2. Enteric tube curls on itself in the mid esophagus before extending back up into the throat. This will need repositioned. The results of this exam were discussed with Dr. Kalani Rosario on 4/24/2022 at 1329 hours Signed by Dr Allyson Napier    XR CHEST PORTABLE    Result Date: 4/24/2022  XR CHEST PORTABLE 4/24/2022 12:34 PM HISTORY: Altered mental status  Technique: Single AP view of the chest COMPARISONS: Chest exam dated 4/1/2022 FINDINGS: No lung consolidation. No pleural effusion or pneumothorax. Cardiomediastinal silhouette and pulmonary vasculature are unremarkable. No acute bony abnormality. Left subclavian region vascular stent with additional stent projecting over the left humerus. No acute cardiopulmonary process.  Signed by Dr Allyson Napier    VL DUP CAROTID BILATERAL    Result Date: 4/29/2022  Vascular Carotid Procedure  Demographics   Patient Name    Jeremías Reece Age                   47   Patient Number  [id number]       Gender                Female   Visit Number    [id number]    Interpreting          Jin Kearney Physician   Date of Birth   1970   Referring Physician   Tre Lim   Accession       [id number]   [address], [place], [address]  Number  Procedure Type of Study:   Cerebral:Carotid, VL CAROTID BILATERAL. Indications for Study:Stroke. Risk Factors   - The patient's risk factor(s) include: diabetes mellitus and arterial     hypertension.   - Current - Every day. Allergies   - Penicillins.   - Stadol.   - Other allergy:(Lamisil Advanced). Technical Quality:Limited visualization due to patient on ventilator. Impression   There is mixed plaque visualized in the bilateral internal carotid  artery(ies). There is less than 50% stenosis in the right internal carotid artery. There is less than 50% stenosis of the left internal carotid artery. There is normal antegrade flow in the bilateral vertebral artery(ies). right side is very limited due to positioning, her chin is resting on her  right clavicle and she is vented. Signature   ----------------------------------------------------------------  Electronically signed by Elvin Leon(Interpreting  physician) on 04/29/2022 03:20 PM  ----------------------------------------------------------------  Blood Pressure:Right arm 178/69 mmHg. Velocities are measured in cm/s ; Diameters are measured in mm Carotid Right Measurements +------------+-------+-------+--------+-------+------------+---------------+ ! Location    ! PSV    ! EDV    ! Angle   ! RI     !%Stenosis   ! Tortuosity     ! +------------+-------+-------+--------+-------+------------+---------------+ ! Prox CCA    !47.2   !       !60      !       !            !               ! +------------+-------+-------+--------+-------+------------+---------------+ ! Mid CCA     !47.2   !       !60      !       !            !               ! +------------+-------+-------+--------+-------+------------+---------------+ ! Prox ICA    !41     !7.46   !60      !0.82   !            ! ! +------------+-------+-------+--------+-------+------------+---------------+ ! Mid ICA     !62.7   !11.2   !60      !0.82   !            !               ! +------------+-------+-------+--------+-------+------------+---------------+ ! Dist ICA    !75.2   !13.7   !60      !0.82   !            !               ! +------------+-------+-------+--------+-------+------------+---------------+ ! Prox ECA    !147    !       !61      !       !            !               ! +------------+-------+-------+--------+-------+------------+---------------+ ! Vertebral   !47.2   !13.7   !60      !0.71   !            !               ! +------------+-------+-------+--------+-------+------------+---------------+   - There is antegrade vertebral flow noted on the right side. - Additional Measurements:ICAPSV/CCAPSV 1.59. Carotid Left Measurements +------------+-------+-------+--------+-------+------------+---------------+ ! Location    ! PSV    ! EDV    ! Angle   ! RI     !%Stenosis   ! Tortuosity     ! +------------+-------+-------+--------+-------+------------+---------------+ ! Prox CCA    !81.4   !9.94   !60      !0.88   !            !               ! +------------+-------+-------+--------+-------+------------+---------------+ ! Mid CCA     !70.2   !7.46   !60      !0.89   !            !               ! +------------+-------+-------+--------+-------+------------+---------------+ ! Prox ICA    !69.4   !11.9   !60      !0.83   !            !               ! +------------+-------+-------+--------+-------+------------+---------------+ ! Mid ICA     !80.6   !19.6   !60      !0.76   !            !               ! +------------+-------+-------+--------+-------+------------+---------------+ ! Dist ICA    !70.3   !12.5   !50      !0.82   !            !               ! +------------+-------+-------+--------+-------+------------+---------------+ ! Prox ECA    !113    !       !61      !       !            !               ! +------------+-------+-------+--------+-------+------------+---------------+ ! Vertebral   !38.8   !       !60      !       !            !               ! +------------+-------+-------+--------+-------+------------+---------------+   - There is antegrade vertebral flow noted on the left side. - Additional Measurements:ICAPSV/CCAPSV 0.99. ICAEDV/CCAEDV 1.97. MRI BRAIN WO CONTRAST    Result Date: 4/29/2022  Examination. MRI BRAIN WO CONTRAST 4/29/2022 11:43 AM History: Seizure activity. The multiplanar, multisequence MR imaging of the brain is performed without contrast enhancement. There is no previous similar study for comparison. The correlation made with CT scan of the head dated 4/24/2022. The diffusion-weighted imaging sequence including the ADC map show an area of acute infarction involving the left posterior frontal, parietal and basal occipital lobe, the left middle cerebral artery territory. A small linear focus of T2 signal in the right posterior paraventricular white matter has no corresponding ADC map changes and may represent a subacute or chronic process. The targeted images of the mesial temporal lobe show moderate symmetrical mesial temporal atrophy without presence of any abnormal signal. This may be a process of general volume loss/atrophy. Moderately dilated ventricles, basal cisterns and the cortical sulci suggestive chronic volume loss/atrophy. The orbits are suboptimally evaluated. No obvious abnormality. Limited visualized optic nerve, optic chiasma and optic tracts are normal and symmetrical. The pituitary gland appears normal. The corpus callosum, the brainstem and cerebellum are normal. The FLAIR sequence images show significant periventricular T2 signal changes representing chronic white matter ischemia and transependymal CSF flow.  The gradient recalled imaging sequence show normal flow void in the visualized intracranial vessels, particularly normal flow in the limited visualized left middle cerebral artery. There is mucosal thickening involving the ethmoid sinuses. The bilateral mastoid effusion. 1. Acute left middle cerebral territory infarct. 2. Chronic ischemic and atrophic changes. 3. The symmetrical mesial temporal atrophy is probably a part of the generalized volume loss. 4. Ethmoid sinusitis. Bilateral mastoid effusion. The above report was immediately called to the nurse in charge of the patient in CCU. Signed by Dr Jluis Smith    ECHO 2D [facility]    Result Date: 4/30/2022  Transthoracic Echocardiography Report (TTE)  Demographics   Patient Name  [facility] Date of Study          04/29/2022   MRN           [id number]       Gender                 Female   Date of Birth 1970   Room Number            MHL-0702   Age           47 year(s)   Height:       66 inches    Referring Physician    Concepcion Maxwell   Weight:       144 pounds   Sonographer            Jessie Madera [facility]   BSA:          1.74 m^2     Interpreting Physician Jorge Holley MD   BMI:          23.24 kg/m^2  Procedure Type of Study   TTE procedure:ECHO NO CONTRAST WITH DOP/COLR. Study Location: Portable Technical Quality: Adequate visualization Patient Status: Inpatient Contrast Medium: Bubble Study. Rhythm: Within normal limits HR: 57 bpm BP: 178/69 mmHg Indications:Stroke. Conclusions   Summary  Mitral valve leaflets are mildly thickened with preserved leaflet  mobility. Mild aortic stenosis. Mean gradient 14 mm hg  Tricuspid valve is structurally normal.  Mild tricuspid regurgitation. Moderately dilated left atrium. Normal intact intra-atrial septum was noted with no evidence of  significant intra-atrial communications by color flow Doppler or by  agitated saline study. Normal left ventricular size with preserved LV function and an estimated  ejection fraction of approximately 55-60%. Moderate concentric left ventricular hypertrophy. No regional wall motion abnormalities.   Grade II diastolic dysfunction   Signature   ----------------------------------------------------------------  Electronically signed by Dieudonne Pressley MD(Interpreting  physician) on 04/30/2022 11:02 AM  ----------------------------------------------------------------   Findings   Mitral Valve  Mitral valve leaflets are mildly thickened with preserved leaflet  mobility. Aortic Valve  Mild aortic stenosis. Mean gradient 14 mm hg   Tricuspid Valve  Tricuspid valve is structurally normal.  Mild tricuspid regurgitation. Pulmonic Valve  The pulmonic valve was not well visualized . Left Atrium  Moderately dilated left atrium. Normal intact intra-atrial septum was noted with no evidence of  significant intra-atrial communications by color flow Doppler or by  agitated saline study. Left Ventricle  Normal left ventricular size with preserved LV function and an estimated  ejection fraction of approximately 55-60%. Moderate concentric left ventricular hypertrophy. No regional wall motion abnormalities. Grade II diastolic dysfunction   Right Atrium  Normal right atrial dimension with no evidence of thrombus or mass noted. Right Ventricle  Normal right ventricular size with preserved RV function. Pericardial Effusion  No evidence of significant pericardial effusion is noted. Pleural Effusion  No evidence of pleural effusion. Allergies   - Penicillins.   - Stadol.   - Other allergy:(Lamisil Advanced). 2D Measurements and Calculations(cm)   LVIDd: 4.4 cm                       LVIDs: 3.14 cm  IVSd: 1.41 cm  LVPWd: 1.43 cm                      AO Root Dimension: 2.4 cm  Rt. Vent.  Dimension: 2.64 cm        LA Dimension: 3.8 cm  % Ejection Fraction: 55 %           LA Area: 24.3 cm^2  LA Volume: 86.1 ml                  LV Systolic dimension: 8.29AW  LA Volume Index: 49 ml/m^2          LV PW diastolic: 4.55NA  LV dimension: 4.4 cm                LVOT diameter: 2 cm                                      RV Diastolic dimension: 2.64cm  LVEDV: 115 ml                       LVEDVI: 66 ml/m^2  LVESV:36.3 ml                       LVESVI: 21 ml/m^2  Cardic Output (CO): 5.53l/min  Doppler Measurements and Calculations   AV Peak Velocity:257 cm/s              MV Peak E-Wave: 82.3 cm/s  AV Mean Velocity:178 cm/s              MV Peak A-Wave: 76.7 cm/s  AV Peak Gradient: 26.42 mmHg           MV E/A Ratio: 1.07 %  AV Mean Gradient: 14 mmHg              MV Mean velocity: 64.8cm/s  AV Area (Continuity):1.85 cm^2         MV Peak Gradient: 2.71 mmHg  AV VTI:52.4 cm/s                       MV Mean gradient: 2 mmHg  TR Velocity:207 cm/s  TR Gradient:17.14 mmHg   MV E' septal velocity: 5.66cm/s  MV E' lateral velocity:5.66 cm/s         Assessment   ESRD  DM2 with nephropathy on long-term insulin, uncontrolled  HTN  Hyponatremia  Hypokalemia  Secondary Hyperparathyroidism  Anemia CKD  Seizure disorder  Hypothyroidism       Plan:  Dialysis per routine scheduling   Ultrafiltration as tolerated  Wean ventilator per primary  Monitor labs  Neuro evaluation reviewed  Work-up reviewed to date, plans for PEG and tracheostomy noted now next week  Discussed with nursing, Dr. Delvis Odell MD  05/07/22  1:36 PM

## 2022-05-07 NOTE — PROGRESS NOTES
[address], [address]  [place], [place]  Phone [phone]     Neurology Progress Note  2022 12:14 PM  Information:   Patient Name: Lei Barclay  :   1970  Age:   47 y.o. MRN:   [id number]  Account #:  [de-identified]  Admit Date:   2022  Today:  22     ADMIT DX:   Acute ischemic stroke [facility])    Subjective:     eLi Barclay is a 48 YO woman with DM and ESRD on HD who was admitted  after an unresponsive episode at home and a generalized convulsive seizure in the ER. She had a very high blood sugar and respiratory failure requiring intubation. She has had little recovery. She had an MRI head  that showed an acute left MCA distribution infarct. Intermittently when sedation is held, she will have minor responsiveness. Interval History:   She remains intubated, sedated, and unresponsive. No further seizures.       Objective:     Past Medical History:  Past Medical History:   Diagnosis Date    Acute ischemic stroke ([facility] 2022    Arthritis     Chronic kidney disease, stage 5, kidney failure (HCC)     Closed fracture of right shoulder girdle, with routine healing, subsequent encounter     DM (diabetes mellitus) type II controlled with renal manifestation ([facility] 1993    Gastroparesis     GERD (gastroesophageal reflux disease)     Hemodialysis patient ([facility]     dialysis on tues, thur, and sat at [facility]    Hypertension     Hypothyroid     Nasal congestion     recent    Neuropathy     Noncompliance with medications     Palliative care patient 10/08/2019    Restless leg syndrome        Past Surgical History:   Procedure Laterality Date    BREAST SURGERY Left     infected milk gland removed    BREAST SURGERY Right 2021    WEDGE EXCISION RIGHT BREAST DUCT WITH LACRIMAL DUCT PROBE, PEC BLOCK performed by Brandon Honeycutt MD at [address] [place] [address], LAPAROSCOPIC          COLONOSCOPY Left 2020    Dr Art Howard hepatic flexure-Severe tortuosity with severe spasming, 1 yr recall    DIALYSIS FISTULA CREATION Left 1/25/2019    REVISION LEFT UPPER EXTREMITY AV ACCESS WITH LEFT BRACHIAL ARTERY TO LEFT AXILLARY VEIN WITH  INTERPOSITIONAL ARTEGRAFT   performed by Kate Goss MD at [address]  2012    [address] Right 1/25/2019    INSERTION OF RIGHT INTERNAL JUGULAR HEMODIALYSIS CATHETER performed by Kate Goss MD at [facility] [facility]  08/17/2018    1.  Moderately increased stainable iron identified by special stain in Kupffer cells and occasional hepatocytes. Negative for evidence of significant portal or lobular inflammation. Negative for evidence of significant fibrosis    AL COLONOSCOPY W/BIOPSY SINGLE/MULTIPLE N/A 10/20/2017    Dr Karl Partida prep-Tubular AP (-) dysplasia x 2--3 yr recall    AL EGD TRANSORAL BIOPSY SINGLE/MULTIPLE N/A 12/27/2016    Dr Bindu Beth EGD TRANSORAL BIOPSY SINGLE/MULTIPLE N/A 10/20/2017    Dr Felix Lott (-)   [address] [facility] VASCULAR SURGERY Left 2016    fistula by Dr Erica Watkins at [address]  10/02/2017    [facility]. Left upper fistulograms/venograms, balloon angioplasty cephalic vein arch 36W34 conquest.    VASCULAR SURGERY  08/2018    FISTULOGRAM    VASCULAR SURGERY  10/29/2018    [facility]. Left upper fistulograms/venograms    VASCULAR SURGERY  12/19/2018    [facility]. Arch aortogram,left upper arteriograms.  VASCULAR SURGERY  03/06/2019    [facility]. Removal of tunneled dialyis catheter right internal jugular vein.  VASCULAR SURGERY  08/28/2019    [facility]. Left upper extremity fistulogram including venography to the superior vena cava. Balloon angioplasty left subclavian vein with 10mm x 40mm conquest balloon. Balloon angioplasty mid/proximal upper arm cephalic vein with 65FK x 40mm conquest balloon. Venograms after balloon angioplasty. Stent left mid/proximal upper arm cephalic vein stenosis fluency 10mm x 60mm self-expanding covered stent. Balloon     VASCULAR SURGERY  2019    cont angioplasty stent with 10mm x 40mm conquest balloon. Completion venograms left upper extremity. Family History   Problem Relation Age of Onset    Colon Cancer Mother          at 63    Colon Polyps Mother     Lung Cancer Father          at 79    Colon Polyps Father     Stomach Cancer Sister     Breast Cancer Sister 27    Depression Daughter 25        committed suicide    Liver Cancer Neg Hx     Liver Disease Neg Hx     Esophageal Cancer Neg Hx     Rectal Cancer Neg Hx        Social History     Socioeconomic History    Marital status: Single     Spouse name: Not on file    Number of children: 2    Years of education: Not on file    Highest education level: Not on file   Occupational History    Not on file   Tobacco Use    Smoking status: Current Every Day Smoker     Packs/day: 0.50     Years: 33.00     Pack years: 16.50     Types: Cigarettes    Smokeless tobacco: Never Used    Tobacco comment: NOW at 1/4 PD, started at age 24 and has averaged 2 PPD   Vaping Use    Vaping Use: Never used   Substance and Sexual Activity    Alcohol use: No    Drug use: Not Currently     Types: Marijuana [place])    Sexual activity: Not Currently     Partners: Male   Other Topics Concern    Not on file   Social History Narrative    Not on file     Social Determinants of Health     Financial Resource Strain:     Difficulty of Paying Living Expenses: Not on file   Food Insecurity:     Worried About Running Out of Food in the Last Year: Not on file    Kendy of Food in the Last Year: Not on file   Transportation Needs:     Lack of Transportation (Medical): Not on file    Lack of Transportation (Non-Medical):  Not on file   Physical Activity:     Days of Exercise per Week: Not on file    Minutes of Exercise per Session: Not on file   Stress:     Feeling of Stress : Not on file   Social Connections:     Frequency of Communication with Friends and Family: Not on file    Frequency of Social Gatherings with Friends and Family: Not on file    Attends Episcopal Services: Not on file    Active Member of Clubs or Organizations: Not on file    Attends Club or Organization Meetings: Not on file    Marital Status: Not on file   Intimate Partner Violence:     Fear of Current or Ex-Partner: Not on file    Emotionally Abused: Not on file    Physically Abused: Not on file    Sexually Abused: Not on file   Housing Stability:     Unable to Pay for Housing in the Last Year: Not on file    Number of Places Lived in the Last Year: Not on file    Unstable Housing in the Last Year: Not on file       Medications:   propofol 50 mcg/kg/min (05/07/22 0737)    dexmedetomidine HCl in NaCl Stopped (05/03/22 1143)    dextrose      sodium chloride Stopped (05/03/22 2005)      acetylcysteine  600 mg Inhalation BID    ipratropium-albuterol  1 ampule Inhalation Q4H    insulin glargine  20 Units SubCUTAneous BID    aspirin  81 mg Oral Daily    methylPREDNISolone  40 mg IntraVENous Q8H    heparin (porcine)  5,000 Units SubCUTAneous TID    menthol-zinc oxide   Topical BID    insulin lispro  0-12 Units SubCUTAneous TID WC    insulin lispro  0-6 Units SubCUTAneous Nightly    vancomycin  125 mg Oral 4 times per day    hydrALAZINE  100 mg Oral 3 times per day    sodium chloride flush  5-40 mL IntraVENous 2 times per day    levetiracetam  500 mg IntraVENous Q12H    chlorhexidine  15 mL Mouth/Throat BID    famotidine (PEPCID) injection  20 mg IntraVENous Daily       Diagnostic Studies:  Reviewed all labs/diagnositcs since last 24hrs:  Recent Results (from the past 24 hour(s))   POCT Glucose    Collection Time: 05/06/22  6:16 PM   Result Value Ref Range    POC Glucose 128 (H) 70 - 99 mg/dl    Performed on [employer]    POCT Glucose    Collection Time: 05/06/22  9:23 PM   Result Value Ref Range    POC Glucose 194 (H) 70 - 99 mg/dl    Performed on [employer]    CBC with Auto Differential    Collection Time: 05/07/22 12:21 AM   Result Value Ref Range    WBC 14.0 (H) 4.8 - 10.8 K/uL    RBC 3.66 (L) 4.20 - 5.40 M/uL    Hemoglobin 10.3 (L) 12.0 - 16.0 g/dL    Hematocrit 34.2 (L) 37.0 - 47.0 %    MCV 93.4 81.0 - 99.0 fL    MCH 28.1 27.0 - 31.0 pg    MCHC 30.1 (L) 33.0 - 37.0 g/dL    RDW 18.6 (H) 11.5 - 14.5 %    Platelets 236 147 - 570 K/uL    MPV 9.4 9.4 - 12.3 fL    Neutrophils % 89.4 (H) 50.0 - 65.0 %    Lymphocytes % 5.3 (L) 20.0 - 40.0 %    Monocytes % 4.8 0.0 - 10.0 %    Eosinophils % 0.0 0.0 - 5.0 %    Basophils % 0.1 0.0 - 1.0 %    Neutrophils Absolute 12.6 (H) 1.5 - 7.5 K/uL    Immature Granulocytes # 0.1 K/uL    Lymphocytes Absolute 0.7 (L) 1.1 - 4.5 K/uL    Monocytes Absolute 0.70 0.00 - 0.90 K/uL    Eosinophils Absolute 0.00 0.00 - 0.60 K/uL    Basophils Absolute 0.00 0.00 - 0.20 K/uL   Comprehensive Metabolic Panel    Collection Time: 05/07/22 12:21 AM   Result Value Ref Range    Sodium 135 (L) 136 - 145 mmol/L    Potassium 4.0 3.5 - 5.0 mmol/L    Chloride 88 (L) 98 - 111 mmol/L    CO2 28 22 - 29 mmol/L    Anion Gap 19 7 - 19 mmol/L    Glucose 170 (H) 74 - 109 mg/dL    BUN 34 (H) 6 - 20 mg/dL    CREATININE 2.4 (H) 0.5 - 0.9 mg/dL    GFR Non-African American 21 (A) >60    GFR  26 (L) >59    Calcium 9.0 8.6 - 10.0 mg/dL    Total Protein 6.4 (L) 6.6 - 8.7 g/dL    Albumin 3.2 (L) 3.5 - 5.2 g/dL    Total Bilirubin <0.2 0.2 - 1.2 mg/dL    Alkaline Phosphatase 104 35 - 104 U/L    ALT 7 5 - 33 U/L    AST 15 5 - 32 U/L   Magnesium    Collection Time: 05/07/22 12:21 AM   Result Value Ref Range    Magnesium 2.3 1.6 - 2.6 mg/dL   Phosphorus    Collection Time: 05/07/22 12:21 AM   Result Value Ref Range    Phosphorus 4.9 (H) 2.5 - 4.5 mg/dL   Protime-INR    Collection Time: 05/07/22 12:21 AM   Result Value Ref Range    Protime 13.3 12.0 - 14.6 sec    INR 1.02 0.88 - 1.18   POCT Glucose    Collection Time: 05/07/22  8:13 AM   Result Value Ref Range    POC Glucose 179 (H) 70 - 99 mg/dl    Performed on [employer]        Diet:  Diet NPO  ADULT TUBE FEEDING; Orogastric; Peptide Based High Protein; Continuous; 41; No; 0; Other (specify); no water flush    Examination:  Vitals: BP (!) 125/57   Pulse 95   Temp 98 °F (36.7 °C) (Temporal)   Resp 14   Ht 5' 6\" (1.676 m)   Wt 130 lb 3.2 oz (59.1 kg)   SpO2 99%   BMI 21.01 kg/m²      Intake/Output Summary (Last 24 hours) at 5/7/2022 1214  Last data filed at 5/7/2022 0800  Gross per 24 hour   Intake 1052.13 ml   Output 10 ml   Net 1042.13 ml     General appearance:  She is a chronically ill appearing woman who is intubated, sedated, and unresponsive in the CCU. HEENT: normocephalic, atraumatic. Lungs: clear to auscultation bilaterally  Heart: regular rate and rhythm, S1, S2 normal, no murmur, click, rub or gallop  Extremities: extremities normal, atraumatic, no cyanosis or edema  Neurologic:   No response to voice, light tactile stimulus, or noxious stimulus. Pupils are small and unresponsive. Corneal reflexes are absent. Oculocephalic reflexes are absent. No blink to threat. No facial asymmetry. She does not move or withdraw extremities. Assessment:   1. Large left hemisphere stroke  2. Seizures  3. Acute hypoxemic respiratory failure  4. Diabetes with nonketotic hyperosmolar hyperglycemia  5. ESRD    Plan:   1. Continue Keppra  2. Continue ASA  3. Continue supportive care  4. Limit sedation as able  5. Trach/PEG planned next week     Discharge planning: To be considered    Reviewed treatment plans with the patient and/or family. 25 minutes spent in face to face interaction and coordination of care.      Electronically signed by Kirill Govea MD on 5/7/2022

## 2022-05-07 NOTE — PROGRESS NOTES
Received call from pt's decision maker, Nimisha Thompson to get consent for placement of trach and peg as previously discussed. Phone consent was obtained from Diana Gee during call for tracheostomy and PEG tube placement by general surgery. Double consent was verified with Yobani by Jade Ramsay RN. Copies of consent placed in pt's soft chart, will pass along in report to notify general surgery in AM of consent that is now on file.      Electronically signed by Endy Valenzuela RN on 5/6/2022 at 8:17 PM

## 2022-05-07 NOTE — PROGRESS NOTES
Hospitalist Progress Note    Patient:  Ina Zamarripa  YOB: 1970  Date of Service: 5/7/2022  MRN: [id number]   Acct: [de-identified]   Primary Care Physician: Armstead Siemens, APRN  Advance Directive: Full Code  Admit Date: 4/24/2022       Hospital Day: 13  Referring Provider: Karen Lance DO    Patient Seen, Chart, Consults, Notes, Labs, Radiology studies reviewed. Subjective:  Ina Zamarripa is a 47 y.o. female  whom we are following for acute left MCA infarct, end-stage renal disease, chronic respiratory failure. No significant clinical change. She is on assist-control rate of 14, tidal volume of 450, 5 of PEEP, FiO2 0.35.     Allergies:  Penicillins, Lamisil advanced [terbinafine], Reglan [metoclopramide], and Stadol [butorphanol]    Medicines:  Current Facility-Administered Medications   Medication Dose Route Frequency Provider Last Rate Last Admin    acetylcysteine (MUCOMYST) 20 % solution 600 mg  600 mg Inhalation BID Karen Lance, DO   600 mg at 05/07/22 0891    ipratropium-albuterol (DUONEB) nebulizer solution 1 ampule  1 ampule Inhalation Q4H Karen Lance, DO   1 ampule at 05/07/22 1021    insulin glargine (LANTUS) injection vial 20 Units  20 Units SubCUTAneous BID Karen Lance, DO   20 Units at 05/07/22 1019    hydrALAZINE (APRESOLINE) injection 10 mg  10 mg IntraVENous Q6H PRN Annika Lama MD   10 mg at 05/06/22 0940    aspirin chewable tablet 81 mg  81 mg Oral Daily Annika Lama MD   81 mg at 05/07/22 1018    racepinephrine HCl (VAPONEFPRIN) 2.25 % nebulizer solution NEBU 11.25 mg  11.25 mg Nebulization Q4H PRN Annika Lama MD   11.25 mg at 05/03/22 1408    sodium chloride nebulizer 0.9 % solution 3 mL  3 mL Nebulization Q4H PRN Annika Lama MD        methylPREDNISolone sodium (SOLU-MEDROL) injection 40 mg  40 mg IntraVENous Q8H Annika Lama MD   40 mg at 05/07/22 2090    propofol injection  5-50 mcg/kg/min IntraVENous Continuous Annika Lama MD 17.9 mL/hr at 05/07/22 0737 50 mcg/kg/min at 05/07/22 0737    heparin (porcine) injection 5,000 Units  5,000 Units SubCUTAneous TID Rafa Leigh MD   5,000 Units at 05/07/22 1018    Peppermint Spirit SPRT   Does not apply PRN Magali Prior, DO        dexmedetomidine (PRECEDEX) 400 mcg in sodium chloride 0.9 % 100 mL infusion  0.1-1.5 mcg/kg/hr IntraVENous Continuous Magali Prior, DO   Stopped at 05/03/22 1143    menthol-zinc oxide (CALMOSEPTINE) 0.44-20.6 % ointment   Topical BID Rafa Leigh MD   Given at 05/07/22 0400    sodium phosphate 10.5 mmol in sodium chloride 0.9 % 250 mL IVPB  0.16 mmol/kg IntraVENous PRN Patience Walter, DO        Or    sodium phosphate 21 mmol in sodium chloride 0.9 % 250 mL IVPB  0.32 mmol/kg IntraVENous PRN Patience Walter, DO        insulin lispro (HUMALOG) injection vial 0-12 Units  0-12 Units SubCUTAneous TID WC Magali Prior, DO   2 Units at 05/07/22 1244    insulin lispro (HUMALOG) injection vial 0-6 Units  0-6 Units SubCUTAneous Nightly Magali Prior, DO   1 Units at 05/06/22 2129    vancomycin (VANCOCIN) oral solution 125 mg  125 mg Oral 4 times per day Magali Prior, DO   125 mg at 05/07/22 1235    hydrALAZINE (APRESOLINE) tablet 100 mg  100 mg Oral 3 times per day Patience Walter, DO   100 mg at 05/06/22 1011    glucagon (rDNA) injection 1 mg  1 mg IntraMUSCular PRN Magali Prior, DO        dextrose 5 % solution  100 mL/hr IntraVENous PRN Magali Prior, DO        glucose chewable tablet 16 g  4 tablet Oral PRN Magali Prior, DO        dextrose bolus (hypoglycemia) 10% 125 mL  125 mL IntraVENous PRN Magali Prior, DO   Stopped at 04/25/22 5735    Or    dextrose bolus (hypoglycemia) 10% 250 mL  250 mL IntraVENous PRN Magali Prior, DO        sodium chloride flush 0.9 % injection 5-40 mL  5-40 mL IntraVENous 2 times per day Magali Prior, DO   10 mL at 05/07/22 1235    sodium chloride flush 0.9 % injection 5-40 mL  5-40 mL IntraVENous PRN Twin Garre, DO        0.9 % sodium chloride infusion   IntraVENous PRN Twin Garre, DO   Stopped at 05/03/22 2005    LORazepam (ATIVAN) injection 1 mg  1 mg IntraVENous Q5 Min PRN Twin Garre, DO        ondansetron (ZOFRAN-ODT) disintegrating tablet 4 mg  4 mg Oral Q8H PRN Twin Garre, DO        Or    ondansetron [facility]) injection 4 mg  4 mg IntraVENous Q6H PRN Twin Garre, DO        polyethylene glycol [facility]) packet 17 g  17 g Oral Daily PRN Twin Garre, DO        acetaminophen (TYLENOL) tablet 650 mg  650 mg Oral Q6H PRN Twin Garre, DO   650 mg at 04/25/22 6210    Or    acetaminophen (TYLENOL) suppository 650 mg  650 mg Rectal Q6H PRN Twin Garre, DO        levETIRAcetam (KEPPRA) 500 mg/100 mL IVPB  500 mg IntraVENous Q12H Twin Garre, DO   Stopped at 05/07/22 1252    chlorhexidine (PERIDEX) 0.12 % solution 15 mL  15 mL Mouth/Throat BID Twin Garre, DO   15 mL at 05/07/22 0900    famotidine (PEPCID) 20 mg in sodium chloride (PF) 10 mL injection  20 mg IntraVENous Daily Twin Garre, DO   20 mg at 05/07/22 1018    magic butt cream   Topical Q4H PRN Twin Garre, DO   Given at 05/03/22 0940    magnesium sulfate 1000 mg in dextrose 5% 100 mL IVPB  1,000 mg IntraVENous PRN Patience Walter, DO        potassium bicarb-citric acid (EFFER-K) effervescent tablet 40 mEq  40 mEq Oral PRN Patience Walter, DO        Or    potassium chloride 10 mEq/100 mL IVPB (Peripheral Line)  10 mEq IntraVENous PRN Patience Walter, DO   Stopped at 04/25/22 9671       Past Medical History:  Past Medical History:   Diagnosis Date    Acute ischemic stroke ([facility] [address] 4/30/2022    Arthritis     Chronic kidney disease, stage 5, kidney failure (HCC)     Closed fracture of right shoulder girdle, with routine healing, subsequent encounter     DM (diabetes mellitus) type II controlled with vein arch 10x80 conquest.    VASCULAR SURGERY  2018    FISTULOGRAM    VASCULAR SURGERY  10/29/2018    [facility]. Left upper fistulograms/venograms    VASCULAR SURGERY  2018    [facility]. Arch aortogram,left upper arteriograms.  VASCULAR SURGERY  2019    [facility]. Removal of tunneled dialyis catheter right internal jugular vein.  VASCULAR SURGERY  2019    [facility]. Left upper extremity fistulogram including venography to the superior vena cava. Balloon angioplasty left subclavian vein with 10mm x 40mm conquest balloon. Balloon angioplasty mid/proximal upper arm cephalic vein with 00GK x 40mm conquest balloon. Venograms after balloon angioplasty. Stent left mid/proximal upper arm cephalic vein stenosis fluency 10mm x 60mm self-expanding covered stent. Balloon     VASCULAR SURGERY  2019    cont angioplasty stent with 10mm x 40mm conquest balloon. Completion venograms left upper extremity.        Family History  Family History   Problem Relation Age of Onset    Colon Cancer Mother          at 63    Colon Polyps Mother     Lung Cancer Father          at 79    Colon Polyps Father     Stomach Cancer Sister     Breast Cancer Sister 27    Depression Daughter 25        committed suicide    Liver Cancer Neg Hx     Liver Disease Neg Hx     Esophageal Cancer Neg Hx     Rectal Cancer Neg Hx        Social History  Social History     Socioeconomic History    Marital status: Single     Spouse name: Not on file    Number of children: 2    Years of education: Not on file    Highest education level: Not on file   Occupational History    Not on file   Tobacco Use    Smoking status: Current Every Day Smoker     Packs/day: 0.50     Years: 33.00     Pack years: 16.50     Types: Cigarettes    Smokeless tobacco: Never Used    Tobacco comment: NOW at 1/4 PD, started at age 24 and has averaged 2 PPD   Vaping Use    Vaping Use: Never used   Substance and Sexual Activity    Alcohol use: No    Drug use: Not Currently     Types: Marijuana Roge Berman)    Sexual activity: Not Currently     Partners: Male   Other Topics Concern    Not on file   Social History Narrative    Not on file     Social Determinants of Health     Financial Resource Strain:     Difficulty of Paying Living Expenses: Not on file   Food Insecurity:     Worried About Running Out of Food in the Last Year: Not on file    Kendy of Food in the Last Year: Not on file   Transportation Needs:     Lack of Transportation (Medical): Not on file    Lack of Transportation (Non-Medical): Not on file   Physical Activity:     Days of Exercise per Week: Not on file    Minutes of Exercise per Session: Not on file   Stress:     Feeling of Stress : Not on file   Social Connections:     Frequency of Communication with Friends and Family: Not on file    Frequency of Social Gatherings with Friends and Family: Not on file    Attends Mu-ism Services: Not on file    Active Member of [address] [place] [address] [employer] or Organizations: Not on file    Attends Club or Organization Meetings: Not on file    Marital Status: Not on file   Intimate Partner Violence:     Fear of Current or Ex-Partner: Not on file    Emotionally Abused: Not on file    Physically Abused: Not on file    Sexually Abused: Not on file   Housing Stability:     Unable to Pay for Housing in the Last Year: Not on file    Number of [place] in the Last Year: Not on file    Unstable Housing in the Last Year: Not on file         Review of Systems:    Review of Systems   Unable to perform ROS: Intubated           Objective:  Blood pressure (!) 144/60, pulse 96, temperature 98 °F (36.7 °C), temperature source Temporal, resp. rate 14, height 5' 6\" (1.676 m), weight 130 lb 3.2 oz (59.1 kg), SpO2 100 %. Intake/Output Summary (Last 24 hours) at 5/7/2022 1341  Last data filed at 5/7/2022 0800  Gross per 24 hour   Intake 1052.13 ml   Output 10 ml   Net 1042.13 ml       Physical Exam  Vitals and nursing note reviewed. Constitutional:       Appearance: She is ill-appearing. Interventions: She is sedated and intubated. HENT:      Head: Normocephalic and atraumatic. Right Ear: External ear normal.      Left Ear: External ear normal.      Nose: Nose normal.      Mouth/Throat:      Mouth: Mucous membranes are moist.   Eyes:      Conjunctiva/sclera: Conjunctivae normal.      Pupils: Pupils are equal, round, and reactive to light. Cardiovascular:      Rate and Rhythm: Normal rate and regular rhythm. Heart sounds: Normal heart sounds. Pulmonary:      Effort: Pulmonary effort is normal. She is intubated. Breath sounds: Normal breath sounds. Abdominal:      General: Abdomen is flat. Palpations: Abdomen is soft. Musculoskeletal:      Cervical back: Neck supple. No rigidity. No muscular tenderness. Skin:     General: Skin is warm and dry. Labs:  BMP:   Recent Labs     05/05/22 0135 05/06/22 0239 05/07/22  0021    136 135*   K 4.2 4.8 4.0   CL 91* 91* 88*   CO2 28 27 28   PHOS 4.9* 6.0* 4.9*   BUN 43* 65* 34*   CREATININE 2.9* 3.6* 2.4*   CALCIUM 9.1 9.0 9.0     CBC:   Recent Labs     05/05/22 0135 05/06/22 0239 05/07/22  0021   WBC 7.5 8.7 14.0*   HGB 9.9* 10.3* 10.3*   HCT 31.7* 33.9* 34.2*   MCV 91.9 93.4 93.4    237 261     LIVER PROFILE:   Recent Labs     05/05/22 0135 05/06/22 0239 05/07/22  0021   AST 13 13 15   ALT 6 6 7   BILITOT <0.2 0.3 <0.2   ALKPHOS 100 96 104     PT/INR:   Recent Labs     05/05/22 0135 05/06/22 0239 05/07/22  0021   PROTIME 13.9 13.5 13.3   INR 1.08 1.04 1.02     APTT: No results for input(s): APTT in the last 72 hours. BNP:  No results for input(s): BNP in the last 72 hours. Ionized Calcium:No results for input(s): IONCA in the last 72 hours.   Magnesium:  Recent Labs     05/05/22  0135 05/06/22  0239 05/07/22  0021   MG 2.4 2.6 2.3     Phosphorus:  Recent Labs     05/05/22  0135 05/06/22 0239 05/07/22  0021   PHOS 4.9* 6.0* 4.9*     HgbA1C: No results for input(s): LABA1C in the last 72 hours. Hepatic:   Recent Labs     05/05/22  0135 05/06/22  0239 05/07/22  0021   ALKPHOS 100 96 104   ALT 6 6 7   AST 13 13 15   PROT 5.9* 6.0* 6.4*   BILITOT <0.2 0.3 <0.2   LABALBU 2.9* 3.0* 3.2*     Lactic Acid: No results for input(s): LACTA in the last 72 hours. Troponin: No results for input(s): CKTOTAL, CKMB, TROPONINT in the last 72 hours. ABGs: No results for input(s): PH, PCO2, PO2, HCO3, O2SAT in the last 72 hours. CRP:  No results for input(s): CRP in the last 72 hours. Sed Rate:  No results for input(s): SEDRATE in the last 72 hours. Cultures:   No results for input(s): CULTURE in the last 72 hours. No results for input(s): BC, Anaebll Ana M in the last 72 hours. No results for input(s): CXSURG in the last 72 hours. Radiology reports as per the Radiologist  Radiology: CT Head WO Contrast    Result Date: 4/24/2022  CT HEAD WO CONTRAST 4/24/2022 2:01 PM HISTORY: Altered mental status COMPARISON: CT scan dated 11/18/2021 DOSE LENGTH PRODUCT: 766 mGy cm TECHNIQUE: Helical tomographic images of the brain were obtained without the use of intravenous contrast. Automated exposure control was also utilized to decrease patient radiation dose. FINDINGS: There is no evidence of evolving large vascular territory infarct. No visualized intra-axial or extra-axial hemorrhage. No mass lesion is identified. Normal size and configuration of the ventricular system. The basal cisterns are symmetric. Posterior fossa structures are unremarkable. The included orbits and their contents are unremarkable. Chronic paranasal sinus disease on the left. The visualized osseous structures and overlying soft tissues of the skull and face are unremarkable. 1. No acute intracranial process.  Signed by Dr Jesus Restrepo    XR CHEST PORTABLE    Result Date: 4/24/2022  XR CHEST PORTABLE 4/24/2022 4:27 PM HISTORY: ET tube, enteric tube  Technique: Single AP view of the chest COMPARISONS: 4/24/2022 FINDINGS: Endotracheal tube is identified with tip essentially touching the krysta. Enteric tube courses into the stomach with tip curled in the fundus back towards the gastroesophageal junction. The lungs are clear and well expanded. Heart size is stable. Pulmonary vasculature are nondilated. No pleural effusion or pneumothorax. 1. Endotracheal tube is deep, tip essentially touching the krysta. Lungs are clear and well expanded. I would recommend 2-3 cm withdrawal for a more optimal positioning. The results of this exam were discussed with Peace Coello (ICU nursing) on 4/24/2022 at 1640 hours Signed by Dr Fannie Burton    XR CHEST PORTABLE    Result Date: 4/24/2022  XR CHEST PORTABLE 4/24/2022 1:17 PM HISTORY: ET tube placement  Technique: Single AP view of the chest COMPARISONS: 4/24/2022 FINDINGS: Status post endotracheal intubation. ET tube is in good position. Lungs are well-expanded. Enteric tube curls on itself in the midesophagus and then extends back up into the throat. Heart size is stable. Pulmonary vasculature are nondilated. 1. ET tube is in good position. Lungs are clear and well expanded. 2. Enteric tube curls on itself in the mid esophagus before extending back up into the throat. This will need repositioned. The results of this exam were discussed with Dr. Cristiane Cortez on 4/24/2022 at 1329 hours Signed by Dr Fannie Burton    XR CHEST PORTABLE    Result Date: 4/24/2022  XR CHEST PORTABLE 4/24/2022 12:34 PM HISTORY: Altered mental status  Technique: Single AP view of the chest COMPARISONS: Chest exam dated 4/1/2022 FINDINGS: No lung consolidation. No pleural effusion or pneumothorax. Cardiomediastinal silhouette and pulmonary vasculature are unremarkable. No acute bony abnormality. Left subclavian region vascular stent with additional stent projecting over the left humerus. No acute cardiopulmonary process.  Signed by Dr Robin Juarez Seizure. Continue Keppra. Neurology following.     Acute left ischemic MCA stroke. Continue supportive care.     Acute hypoxemic respiratory failure. Continue ventilatory support. Proceed with trach and PEG.    Nonketotic hyperosmolar hyperglycemia. Resolved  Transitioned to subcutaneous insulin     End-stage renal disease. Dialysis today for additional ultrafiltration.     Please document 39 minutes of critical care time for patient assessment, chart review, discussion with staff, .       Jani Marking, DO

## 2022-05-08 LAB
ALBUMIN SERPL-MCNC: 3.2 G/DL (ref 3.5–5.2)
ALP BLD-CCNC: 102 U/L (ref 35–104)
ALT SERPL-CCNC: 7 U/L (ref 5–33)
ANION GAP SERPL CALCULATED.3IONS-SCNC: 20 MMOL/L (ref 7–19)
AST SERPL-CCNC: 13 U/L (ref 5–32)
BASOPHILS ABSOLUTE: 0 K/UL (ref 0–0.2)
BASOPHILS RELATIVE PERCENT: 0.1 % (ref 0–1)
BILIRUB SERPL-MCNC: 0.3 MG/DL (ref 0.2–1.2)
BUN BLDV-MCNC: 58 MG/DL (ref 6–20)
CALCIUM SERPL-MCNC: 9.3 MG/DL (ref 8.6–10)
CHLORIDE BLD-SCNC: 89 MMOL/L (ref 98–111)
CO2: 27 MMOL/L (ref 22–29)
CREAT SERPL-MCNC: 3.2 MG/DL (ref 0.5–0.9)
EOSINOPHILS ABSOLUTE: 0 K/UL (ref 0–0.6)
EOSINOPHILS RELATIVE PERCENT: 0 % (ref 0–5)
GFR AFRICAN AMERICAN: 18
GFR NON-AFRICAN AMERICAN: 15
GLUCOSE BLD-MCNC: 179 MG/DL (ref 70–99)
GLUCOSE BLD-MCNC: 205 MG/DL (ref 70–99)
GLUCOSE BLD-MCNC: 222 MG/DL (ref 70–99)
GLUCOSE BLD-MCNC: 222 MG/DL (ref 70–99)
GLUCOSE BLD-MCNC: 91 MG/DL (ref 74–109)
HCT VFR BLD CALC: 32.8 % (ref 37–47)
HEMOGLOBIN: 9.8 G/DL (ref 12–16)
IMMATURE GRANULOCYTES #: 0.1 K/UL
INR BLD: 1.07 (ref 0.88–1.18)
LYMPHOCYTES ABSOLUTE: 0.6 K/UL (ref 1.1–4.5)
LYMPHOCYTES RELATIVE PERCENT: 4.1 % (ref 20–40)
MAGNESIUM: 2.6 MG/DL (ref 1.6–2.6)
MCH RBC QN AUTO: 28.7 PG (ref 27–31)
MCHC RBC AUTO-ENTMCNC: 29.9 G/DL (ref 33–37)
MCV RBC AUTO: 95.9 FL (ref 81–99)
MONOCYTES ABSOLUTE: 0.5 K/UL (ref 0–0.9)
MONOCYTES RELATIVE PERCENT: 3.3 % (ref 0–10)
NEUTROPHILS ABSOLUTE: 14.3 K/UL (ref 1.5–7.5)
NEUTROPHILS RELATIVE PERCENT: 92.1 % (ref 50–65)
PDW BLD-RTO: 18.7 % (ref 11.5–14.5)
PERFORMED ON: ABNORMAL
PHOSPHORUS: 7.3 MG/DL (ref 2.5–4.5)
PLATELET # BLD: 250 K/UL (ref 130–400)
PMV BLD AUTO: 9.5 FL (ref 9.4–12.3)
POTASSIUM SERPL-SCNC: 4.8 MMOL/L (ref 3.5–5)
PROTHROMBIN TIME: 13.8 SEC (ref 12–14.6)
RBC # BLD: 3.42 M/UL (ref 4.2–5.4)
SODIUM BLD-SCNC: 136 MMOL/L (ref 136–145)
TOTAL PROTEIN: 6.3 G/DL (ref 6.6–8.7)
WBC # BLD: 15.5 K/UL (ref 4.8–10.8)

## 2022-05-08 PROCEDURE — 2700000000 HC OXYGEN THERAPY PER DAY

## 2022-05-08 PROCEDURE — 82947 ASSAY GLUCOSE BLOOD QUANT: CPT

## 2022-05-08 PROCEDURE — 2580000003 HC RX 258: Performed by: INTERNAL MEDICINE

## 2022-05-08 PROCEDURE — 2100000000 HC CCU R&B

## 2022-05-08 PROCEDURE — 80053 COMPREHEN METABOLIC PANEL: CPT

## 2022-05-08 PROCEDURE — 6360000002 HC RX W HCPCS: Performed by: INTERNAL MEDICINE

## 2022-05-08 PROCEDURE — 6370000000 HC RX 637 (ALT 250 FOR IP): Performed by: INTERNAL MEDICINE

## 2022-05-08 PROCEDURE — 85025 COMPLETE CBC W/AUTO DIFF WBC: CPT

## 2022-05-08 PROCEDURE — 94640 AIRWAY INHALATION TREATMENT: CPT

## 2022-05-08 PROCEDURE — 2500000003 HC RX 250 WO HCPCS: Performed by: INTERNAL MEDICINE

## 2022-05-08 PROCEDURE — 94003 VENT MGMT INPAT SUBQ DAY: CPT

## 2022-05-08 PROCEDURE — 84100 ASSAY OF PHOSPHORUS: CPT

## 2022-05-08 PROCEDURE — 85610 PROTHROMBIN TIME: CPT

## 2022-05-08 PROCEDURE — 99232 SBSQ HOSP IP/OBS MODERATE 35: CPT | Performed by: PSYCHIATRY & NEUROLOGY

## 2022-05-08 PROCEDURE — 83735 ASSAY OF MAGNESIUM: CPT

## 2022-05-08 PROCEDURE — 36415 COLL VENOUS BLD VENIPUNCTURE: CPT

## 2022-05-08 RX ADMIN — IPRATROPIUM BROMIDE AND ALBUTEROL SULFATE 1 AMPULE: 2.5; .5 SOLUTION RESPIRATORY (INHALATION) at 06:29

## 2022-05-08 RX ADMIN — IPRATROPIUM BROMIDE AND ALBUTEROL SULFATE 1 AMPULE: 2.5; .5 SOLUTION RESPIRATORY (INHALATION) at 22:19

## 2022-05-08 RX ADMIN — HEPARIN SODIUM 5000 UNITS: 5000 INJECTION INTRAVENOUS; SUBCUTANEOUS at 20:38

## 2022-05-08 RX ADMIN — VANCOMYCIN HYDROCHLORIDE 125 MG: 1 INJECTION, POWDER, LYOPHILIZED, FOR SOLUTION INTRAVENOUS at 12:28

## 2022-05-08 RX ADMIN — PROPOFOL 30 MCG/KG/MIN: 10 INJECTION, EMULSION INTRAVENOUS at 21:01

## 2022-05-08 RX ADMIN — ACETYLCYSTEINE 600 MG: 200 SOLUTION ORAL; RESPIRATORY (INHALATION) at 18:05

## 2022-05-08 RX ADMIN — HYDRALAZINE HYDROCHLORIDE 100 MG: 50 TABLET, FILM COATED ORAL at 23:23

## 2022-05-08 RX ADMIN — METHYLPREDNISOLONE SODIUM SUCCINATE 40 MG: 40 INJECTION, POWDER, FOR SOLUTION INTRAMUSCULAR; INTRAVENOUS at 07:23

## 2022-05-08 RX ADMIN — IPRATROPIUM BROMIDE AND ALBUTEROL SULFATE 1 AMPULE: 2.5; .5 SOLUTION RESPIRATORY (INHALATION) at 10:29

## 2022-05-08 RX ADMIN — HEPARIN SODIUM 5000 UNITS: 5000 INJECTION INTRAVENOUS; SUBCUTANEOUS at 15:57

## 2022-05-08 RX ADMIN — ACETYLCYSTEINE 600 MG: 200 SOLUTION ORAL; RESPIRATORY (INHALATION) at 06:29

## 2022-05-08 RX ADMIN — VANCOMYCIN HYDROCHLORIDE 1500 MG: 10 INJECTION, POWDER, LYOPHILIZED, FOR SOLUTION INTRAVENOUS at 16:03

## 2022-05-08 RX ADMIN — CHLORHEXIDINE GLUCONATE 15 ML: 1.2 RINSE ORAL at 09:04

## 2022-05-08 RX ADMIN — INSULIN LISPRO 4 UNITS: 100 INJECTION, SOLUTION INTRAVENOUS; SUBCUTANEOUS at 09:06

## 2022-05-08 RX ADMIN — ANORECTAL OINTMENT: 15.7; .44; 24; 20.6 OINTMENT TOPICAL at 07:23

## 2022-05-08 RX ADMIN — LEVETIRACETAM 500 MG: 5 INJECTION INTRAVENOUS at 12:29

## 2022-05-08 RX ADMIN — ANORECTAL OINTMENT: 15.7; .44; 24; 20.6 OINTMENT TOPICAL at 17:39

## 2022-05-08 RX ADMIN — IPRATROPIUM BROMIDE AND ALBUTEROL SULFATE 1 AMPULE: 2.5; .5 SOLUTION RESPIRATORY (INHALATION) at 01:32

## 2022-05-08 RX ADMIN — HYDRALAZINE HYDROCHLORIDE 100 MG: 50 TABLET, FILM COATED ORAL at 15:57

## 2022-05-08 RX ADMIN — Medication 10 ML: at 09:04

## 2022-05-08 RX ADMIN — INSULIN LISPRO 1 UNITS: 100 INJECTION, SOLUTION INTRAVENOUS; SUBCUTANEOUS at 20:33

## 2022-05-08 RX ADMIN — PROPOFOL 25 MCG/KG/MIN: 10 INJECTION, EMULSION INTRAVENOUS at 01:12

## 2022-05-08 RX ADMIN — IPRATROPIUM BROMIDE AND ALBUTEROL SULFATE 1 AMPULE: 2.5; .5 SOLUTION RESPIRATORY (INHALATION) at 14:09

## 2022-05-08 RX ADMIN — ASPIRIN 81 MG 81 MG: 81 TABLET ORAL at 09:04

## 2022-05-08 RX ADMIN — INSULIN GLARGINE 20 UNITS: 100 INJECTION, SOLUTION SUBCUTANEOUS at 09:06

## 2022-05-08 RX ADMIN — IPRATROPIUM BROMIDE AND ALBUTEROL SULFATE 1 AMPULE: 2.5; .5 SOLUTION RESPIRATORY (INHALATION) at 18:05

## 2022-05-08 RX ADMIN — CHLORHEXIDINE GLUCONATE 15 ML: 1.2 RINSE ORAL at 20:38

## 2022-05-08 RX ADMIN — INSULIN GLARGINE 20 UNITS: 100 INJECTION, SOLUTION SUBCUTANEOUS at 20:37

## 2022-05-08 RX ADMIN — LEVETIRACETAM 500 MG: 5 INJECTION INTRAVENOUS at 01:09

## 2022-05-08 RX ADMIN — VANCOMYCIN HYDROCHLORIDE 125 MG: 1 INJECTION, POWDER, LYOPHILIZED, FOR SOLUTION INTRAVENOUS at 01:09

## 2022-05-08 RX ADMIN — INSULIN LISPRO 4 UNITS: 100 INJECTION, SOLUTION INTRAVENOUS; SUBCUTANEOUS at 17:40

## 2022-05-08 RX ADMIN — PROPOFOL 25 MCG/KG/MIN: 10 INJECTION, EMULSION INTRAVENOUS at 11:55

## 2022-05-08 RX ADMIN — GENTAMICIN SULFATE 175 MG: 40 INJECTION, SOLUTION INTRAMUSCULAR; INTRAVENOUS at 17:39

## 2022-05-08 RX ADMIN — SODIUM CHLORIDE, PRESERVATIVE FREE 20 MG: 5 INJECTION INTRAVENOUS at 09:04

## 2022-05-08 RX ADMIN — Medication 10 ML: at 20:00

## 2022-05-08 RX ADMIN — HEPARIN SODIUM 5000 UNITS: 5000 INJECTION INTRAVENOUS; SUBCUTANEOUS at 09:04

## 2022-05-08 RX ADMIN — VANCOMYCIN HYDROCHLORIDE 125 MG: 1 INJECTION, POWDER, LYOPHILIZED, FOR SOLUTION INTRAVENOUS at 07:23

## 2022-05-08 RX ADMIN — INSULIN LISPRO 4 UNITS: 100 INJECTION, SOLUTION INTRAVENOUS; SUBCUTANEOUS at 12:31

## 2022-05-08 ASSESSMENT — PULMONARY FUNCTION TESTS
PIF_VALUE: 17
PEFR_L/MIN: 14
PIF_VALUE: 18
PIF_VALUE: 17
PIF_VALUE: 24
PIF_VALUE: 18
PIF_VALUE: 19
PIF_VALUE: 18
PIF_VALUE: 19
PIF_VALUE: 16
PIF_VALUE: 24
PIF_VALUE: 16
PIF_VALUE: 24
PIF_VALUE: 16
PIF_VALUE: 17
PIF_VALUE: 23
PIF_VALUE: 18
PIF_VALUE: 17
PIF_VALUE: 19
PIF_VALUE: 18
PIF_VALUE: 20
PIF_VALUE: 16
PIF_VALUE: 23

## 2022-05-08 ASSESSMENT — PAIN SCALES - GENERAL
PAINLEVEL_OUTOF10: 0

## 2022-05-08 NOTE — PROGRESS NOTES
Nephrology ([facility]) Progress Note    Patient:  Vel Hernandez  YOB: 1970  Date of Service: 5/8/2022  MRN: [id number]   Acct: [de-identified]   Primary Care Physician: ROMINA Schultz  Advance Directive: Full Code  Admit Date: 4/24/2022       Hospital Day: 14  Referring Provider: Clint Iglesias DO    Patient independently seen and examined, Chart, Consults, Notes, Operative notes, Labs, Cardiology, and Radiology studies reviewed as available. Subjective:  Vel Hernandez is a 47 y.o. female for whom we were consulted for evaluation and treatment of end-stage renal disease. Patient also has a history of seizure disorder, type 2 diabetes, hypertension, frequent hospitalization with noncompliance and poorly controlled diabetes. Patient was brought to the emergency room after she was found to have unresponsiveness and witnessed grand mal seizure. She was brought in by ambulance. On arrival in the emergency room, she was found to be severely hyperglycemic and had a urinary tract infection. Patient was intubated, sedated and treated by neurology for grand mal seizure. She was also receiving IV antibiotics and insulin drip. Her blood sugar control had significantly improved. On April 25, she has received emergent hemodialysis treatment for correction of volume status as well as hyponatremia. She was moved from ICU to CCU. Today, patient remains on the ventilator and unable to participate in the history and physical examination. Events reviewed with nursing at the bedside. No family present. No issues with dialysis this week.       Allergies:  Penicillins, Lamisil advanced [terbinafine], Reglan [metoclopramide], and Stadol [butorphanol]    Medicines:  Current Facility-Administered Medications   Medication Dose Route Frequency Provider Last Rate Last Admin    acetylcysteine (MUCOMYST) 20 % solution 600 mg  600 mg Inhalation BID Clint Iglesias DO   600 mg at 05/08/22 0629    ipratropium-albuterol (DUONEB) nebulizer solution 1 ampule  1 ampule Inhalation Q4H Tristian Hubbard, DO   1 ampule at 05/08/22 1029    insulin glargine (LANTUS) injection vial 20 Units  20 Units SubCUTAneous BID Tristian Hubbard, DO   20 Units at 05/08/22 0671    hydrALAZINE (APRESOLINE) injection 10 mg  10 mg IntraVENous Q6H PRN Hadley Reese MD   10 mg at 05/06/22 0940    aspirin chewable tablet 81 mg  81 mg Oral Daily Hadley Reese MD   81 mg at 05/08/22 0904    racepinephrine HCl (VAPONEFPRIN) 2.25 % nebulizer solution NEBU 11.25 mg  11.25 mg Nebulization Q4H PRN Hadley Reese MD   11.25 mg at 05/03/22 1408    sodium chloride nebulizer 0.9 % solution 3 mL  3 mL Nebulization Q4H PRN Hadley Reese MD        methylPREDNISolone sodium (SOLU-MEDROL) injection 40 mg  40 mg IntraVENous Q8H Hadley Reese MD   40 mg at 05/08/22 1058    propofol injection  5-50 mcg/kg/min IntraVENous Continuous Hadley Reese MD 9 mL/hr at 05/08/22 1155 25 mcg/kg/min at 05/08/22 1155    heparin (porcine) injection 5,000 Units  5,000 Units SubCUTAneous TID Hadley Reese MD   5,000 Units at 05/08/22 5542    Peppermint Spirit SPRT   Does not apply PRN Tristian Hubbard, DO        dexmedetomidine (PRECEDEX) 400 mcg in sodium chloride 0.9 % 100 mL infusion  0.1-1.5 mcg/kg/hr IntraVENous Continuous Tristian Hubbard, DO   Stopped at 05/03/22 1143    menthol-zinc oxide (CALMOSEPTINE) 0.44-20.6 % ointment   Topical BID Hadley Reese MD   Given at 05/08/22 3927    sodium phosphate 10.5 mmol in sodium chloride 0.9 % 250 mL IVPB  0.16 mmol/kg IntraVENous PRN Patience Walter, DO        Or    sodium phosphate 21 mmol in sodium chloride 0.9 % 250 mL IVPB  0.32 mmol/kg IntraVENous PRN Patience Walter, DO        insulin lispro (HUMALOG) injection vial 0-12 Units  0-12 Units SubCUTAneous TID  Tristian Hubbard DO   4 Units at 05/08/22 0906    insulin lispro (HUMALOG) injection vial 0-6 Units  0-6 Units SubCUTAneous Nightly Marisela Heman, DO   1 Units at 05/06/22 2129    vancomycin (VANCOCIN) oral solution 125 mg  125 mg Oral 4 times per day Marisela Heman, DO   125 mg at 05/08/22 0878    hydrALAZINE (APRESOLINE) tablet 100 mg  100 mg Oral 3 times per day Patience Walter, DO   100 mg at 05/07/22 2137    glucagon (rDNA) injection 1 mg  1 mg IntraMUSCular PRN Marisela Heman, DO        dextrose 5 % solution  100 mL/hr IntraVENous PRN Marisela Heman, DO        glucose chewable tablet 16 g  4 tablet Oral PRN Marisela Heman, DO        dextrose bolus (hypoglycemia) 10% 125 mL  125 mL IntraVENous PRN Marisela Heman, DO   Stopped at 04/25/22 2258    Or    dextrose bolus (hypoglycemia) 10% 250 mL  250 mL IntraVENous PRN Marisela Heman, DO        sodium chloride flush 0.9 % injection 5-40 mL  5-40 mL IntraVENous 2 times per day Marisela Heman, DO   10 mL at 05/08/22 9019    sodium chloride flush 0.9 % injection 5-40 mL  5-40 mL IntraVENous PRN Marisela Heman, DO        0.9 % sodium chloride infusion   IntraVENous PRN Marisela Heman, DO   Stopped at 05/03/22 2005    LORazepam (ATIVAN) injection 1 mg  1 mg IntraVENous Q5 Min PRN Marisela Heman, DO        ondansetron (ZOFRAN-ODT) disintegrating tablet 4 mg  4 mg Oral Q8H PRN Marisela Heman, DO        Or    ondansetron [facility] injection 4 mg  4 mg IntraVENous Q6H PRN Marisela Heman, DO        polyethylene glycol [facility]) packet 17 g  17 g Oral Daily PRN Marisela Heman, DO        acetaminophen (TYLENOL) tablet 650 mg  650 mg Oral Q6H PRN Marisela Heman, DO   650 mg at 04/25/22 5075    Or    acetaminophen (TYLENOL) suppository 650 mg  650 mg Rectal Q6H PRN Marisela Heman, DO        levETIRAcetam (KEPPRA) 500 mg/100 mL IVPB  500 mg IntraVENous Q12H Marisela Heman, DO   Stopped at 05/08/22 0124    chlorhexidine (PERIDEX) 0.12 % solution 15 mL  15 mL Mouth/Throat BID Glennda Liter Natalie Thompson, DO   15 mL at 05/08/22 0904    famotidine (PEPCID) 20 mg in sodium chloride (PF) 10 mL injection  20 mg IntraVENous Daily Alphonso Hikes, DO   20 mg at 05/08/22 5646    magic butt cream   Topical Q4H PRN Alphonso Hikes, DO   Given at 05/03/22 0940    magnesium sulfate 1000 mg in dextrose 5% 100 mL IVPB  1,000 mg IntraVENous PRN Patience Walter, DO        potassium bicarb-citric acid (EFFER-K) effervescent tablet 40 mEq  40 mEq Oral PRN Patience Walter, DO        Or    potassium chloride 10 mEq/100 mL IVPB (Peripheral Line)  10 mEq IntraVENous PRN Shahram Vicente, DO   Stopped at 04/25/22 0936       Past Medical History:  Past Medical History:   Diagnosis Date    Acute ischemic stroke ([facility] 4/30/2022    Arthritis     Chronic kidney disease, stage 5, kidney failure (HCC)     Closed fracture of right shoulder girdle, with routine healing, subsequent encounter     DM (diabetes mellitus) type II controlled with renal manifestation ([facility] 06/1993    Gastroparesis     GERD (gastroesophageal reflux disease)     Hemodialysis patient ([facility]     dialysis on tues, thur, and sat at [facility]    Hypertension     Hypothyroid     Nasal congestion     recent    Neuropathy     Noncompliance with medications     Palliative care patient 10/08/2019    Restless leg syndrome        Past Surgical History:  Past Surgical History:   Procedure Laterality Date    BREAST SURGERY Left 2008    infected milk gland removed    BREAST SURGERY Right 5/25/2021    WEDGE EXCISION RIGHT BREAST DUCT WITH LACRIMAL DUCT PROBE, PEC BLOCK performed by Kimberly Rodas MD at [address] [place] [address], LAPAROSCOPIC      2008    COLONOSCOPY Left 9/17/2020    Dr Colton Rendon hepatic flexure-Severe tortuosity with severe spasming, 1 yr recall    DIALYSIS FISTULA CREATION Left 1/25/2019    REVISION LEFT UPPER EXTREMITY AV ACCESS WITH LEFT BRACHIAL ARTERY TO LEFT AXILLARY VEIN WITH  INTERPOSITIONAL ARTEGRAFT performed by Julius Pina MD at [address]  2012    [address] Right 1/25/2019    INSERTION OF RIGHT INTERNAL JUGULAR HEMODIALYSIS CATHETER performed by Julius Pina MD at [facility]  08/17/2018    1.  Moderately increased stainable iron identified by special stain in Kupffer cells and occasional hepatocytes. Negative for evidence of significant portal or lobular inflammation. Negative for evidence of significant fibrosis    MA COLONOSCOPY W/BIOPSY SINGLE/MULTIPLE N/A 10/20/2017    Dr John Ellison prep-Tubular AP (-) dysplasia x 2--3 yr recall    MA EGD TRANSORAL BIOPSY SINGLE/MULTIPLE N/A 12/27/2016    Dr [facility] EGD TRANSORAL BIOPSY SINGLE/MULTIPLE N/A 10/20/2017    Dr Victoria Stager (-)   [facility] VASCULAR SURGERY Left 2016    fistula by Dr Volodymyr Ugalde at [address]  10/02/2017    [facility]. Left upper fistulograms/venograms, balloon angioplasty cephalic vein arch [id number] conquest.    VASCULAR SURGERY  08/2018    FISTULOGRAM    VASCULAR SURGERY  10/29/2018    [facility]. Left upper fistulograms/venograms    VASCULAR SURGERY  12/19/2018    [facility]. Arch aortogram,left upper arteriograms.  VASCULAR SURGERY  03/06/2019    [facility]. Removal of tunneled dialyis catheter right internal jugular vein.  VASCULAR SURGERY  08/28/2019    [facility]. Left upper extremity fistulogram including venography to the superior vena cava. Balloon angioplasty left subclavian vein with 10mm x 40mm conquest balloon. Balloon angioplasty mid/proximal upper arm cephalic vein with [id number] x 40mm conquest balloon. Venograms after balloon angioplasty. Stent left mid/proximal upper arm cephalic vein stenosis fluency 10mm x 60mm self-expanding covered stent. Balloon     VASCULAR SURGERY  08/28/2019    cont angioplasty stent with 10mm x 40mm conquest balloon. Completion venograms left upper extremity.        Family History  Family History   Problem Relation Age of Onset    Colon Cancer Mother          at 61    Colon Polyps Mother     Lung Cancer Father          at 66    Colon Polyps Father     Stomach Cancer Sister     Breast Cancer Sister 27    Depression Daughter 25        committed suicide    Liver Cancer Neg Hx     Liver Disease Neg Hx     Esophageal Cancer Neg Hx     Rectal Cancer Neg Hx        Social History  Social History     Socioeconomic History    Marital status: Single     Spouse name: Not on file    Number of children: 2    Years of education: Not on file    Highest education level: Not on file   Occupational History    Not on file   Tobacco Use    Smoking status: Current Every Day Smoker     Packs/day: 0.50     Years: 33.00     Pack years: 16.50     Types: Cigarettes    Smokeless tobacco: Never Used    Tobacco comment: NOW at 1/4 PD, started at age 24 and has averaged 2 PPD   Vaping Use    Vaping Use: Never used   Substance and Sexual Activity    Alcohol use: No    Drug use: Not Currently     Types: Marijuana [employer])    Sexual activity: Not Currently     Partners: Male   Other Topics Concern    Not on file   Social History Narrative    Not on file     Social Determinants of Health     Financial Resource Strain:     Difficulty of Paying Living Expenses: Not on file   Food Insecurity:     Worried About Running Out of Food in the Last Year: Not on file    Kendy of Food in the Last Year: Not on file   Transportation Needs:     Lack of Transportation (Medical): Not on file    Lack of Transportation (Non-Medical):  Not on file   Physical Activity:     Days of Exercise per Week: Not on file    Minutes of Exercise per Session: Not on file   Stress:     Feeling of Stress : Not on file   Social Connections:     Frequency of Communication with Friends and Family: Not on file    Frequency of Social Gatherings with Friends and Family: Not on file    Attends Druze Services: Not on file    Active Member of Clubs or Organizations: Not on file   Erika Portillo Attends Club or Organization Meetings: Not on file    Marital Status: Not on file   Intimate Partner Violence:     Fear of Current or Ex-Partner: Not on file    Emotionally Abused: Not on file    Physically Abused: Not on file    Sexually Abused: Not on file   Housing Stability:     Unable to Pay for Housing in the Last Year: Not on file    Number of Places Lived in the Last Year: Not on file    Unstable Housing in the Last Year: Not on file         Review of Systems:  History obtained from unobtainable from patient due to mental status          Objective:  Patient Vitals for the past 24 hrs:   BP Temp Temp src Pulse Resp SpO2 Weight   05/08/22 1100 (!) 142/54   97 14 96 %    05/08/22 1000 (!) 151/60   106 14 98 %    05/08/22 0900 (!) 163/57   93 14 98 %    05/08/22 0800 (!) 147/56   95 14 98 %    05/08/22 0700 (!) 145/61   93 14 97 %    05/08/22 0633    94 12 99 %    05/08/22 0500 (!) 150/64   95 14 99 %    05/08/22 0400 (!) 156/54 98.1 °F (36.7 °C) Temporal 99 14 98 % 126 lb 9.6 oz (57.4 kg)   05/08/22 0300 (!) 120/56   100 14 99 %    05/08/22 0200 (!) 135/57   92 14 99 %    05/08/22 0000 (!) 150/58 98.4 °F (36.9 °C) Temporal 106 14 98 %    05/07/22 2300 (!) 146/64   104 16 99 %    05/07/22 2200 (!) 130/59   101 14 100 %    05/07/22 2137 (!) 151/61         05/07/22 2100 (!) 138/58   101 14 100 %    05/07/22 2000 137/66 99 °F (37.2 °C) Temporal 99 15 99 %    05/07/22 1900 (!) 127/55   97 14 99 %    05/07/22 1800 (!) 128/58   92 14 100 %    05/07/22 1700 (!) 114/53   100 14 98 %    05/07/22 1600 (!) 144/57   100 15 99 %    05/07/22 1500 (!) 143/57   99 15 99 %    05/07/22 1416    98 14 100 %    05/07/22 1400 (!) 140/57   96 14 99 %    05/07/22 1300 (!) 144/60   96 14 100 %        Intake/Output Summary (Last 24 hours) at 5/8/2022 1202  Last data filed at 5/8/2022 1000  Gross per 24 hour   Intake 1614.29 ml   Output 100 ml   Net 1514.29 ml General: Ventilated and unable to participate in the history and physical examination  Chest:  clear to auscultation bilaterally  CVS: regular rate and rhythm  Abdominal: soft, nontender, normal bowel sounds  Extremities: no cyanosis or edema  Skin: warm and dry without rash    Labs:  BMP:   Recent Labs     05/06/22 0239 05/07/22 0021 05/08/22 0123    135* 136   K 4.8 4.0 4.8   CL 91* 88* 89*   CO2 27 28 27   PHOS 6.0* 4.9* 7.3*   BUN 65* 34* 58*   CREATININE 3.6* 2.4* 3.2*   CALCIUM 9.0 9.0 9.3     CBC:   Recent Labs     05/06/22 0239 05/07/22 0021 05/08/22 0123   WBC 8.7 14.0* 15.5*   HGB 10.3* 10.3* 9.8*   HCT 33.9* 34.2* 32.8*   MCV 93.4 93.4 95.9    261 250     LIVER PROFILE:   Recent Labs     05/06/22 0239 05/07/22 0021 05/08/22 0123   AST 13 15 13   ALT 6 7 7   BILITOT 0.3 <0.2 0.3   ALKPHOS 96 104 102     PT/INR:   Recent Labs     05/06/22 0239 05/07/22 0021 05/08/22 0123   PROTIME 13.5 13.3 13.8   INR 1.04 1.02 1.07     APTT: No results for input(s): APTT in the last 72 hours. BNP:  No results for input(s): BNP in the last 72 hours. Ionized Calcium:No results for input(s): IONCA in the last 72 hours. Magnesium:  Recent Labs     05/06/22 0239 05/07/22 0021 05/08/22 0123   MG 2.6 2.3 2.6     Phosphorus:  Recent Labs     05/06/22 0239 05/07/22 0021 05/08/22 0123   PHOS 6.0* 4.9* 7.3*     HgbA1C:   No results for input(s): LABA1C in the last 72 hours. Hepatic:   Recent Labs     05/06/22  0239 05/07/22  0021 05/08/22  0123   ALKPHOS 96 104 102   ALT 6 7 7   AST 13 15 13   PROT 6.0* 6.4* 6.3*   BILITOT 0.3 <0.2 0.3   LABALBU 3.0* 3.2* 3.2*     Lactic Acid: No results for input(s): LACTA in the last 72 hours. Troponin: No results for input(s): CKTOTAL, CKMB, TROPONINT in the last 72 hours.   ABGs:   Lab Results   Component Value Date    PHART 7.540 05/04/2022    PO2ART 119.0 05/04/2022    DNY8FFK 36.0 05/04/2022     CRP:  No results for input(s): CRP in the last 72 hours. Sed Rate:  No results for input(s): SEDRATE in the last 72 hours. Culture Results:   Blood Culture Recent:   Recent Labs     04/24/22  1447   BC No growth after 5 days of incubation. Urine Culture Recent :   Recent Labs     04/24/22  1354   LABURIN No growth at 36-48 hours       Radiology reports as per the Radiologist  Radiology: CT Head WO Contrast    Result Date: 4/24/2022  CT HEAD WO CONTRAST 4/24/2022 2:01 PM HISTORY: Altered mental status COMPARISON: CT scan dated 11/18/2021 DOSE LENGTH PRODUCT: 766 mGy cm TECHNIQUE: Helical tomographic images of the brain were obtained without the use of intravenous contrast. Automated exposure control was also utilized to decrease patient radiation dose. FINDINGS: There is no evidence of evolving large vascular territory infarct. No visualized intra-axial or extra-axial hemorrhage. No mass lesion is identified. Normal size and configuration of the ventricular system. The basal cisterns are symmetric. Posterior fossa structures are unremarkable. The included orbits and their contents are unremarkable. Chronic paranasal sinus disease on the left. The visualized osseous structures and overlying soft tissues of the skull and face are unremarkable. 1. No acute intracranial process. Signed by Dr Aide Honeycutt    Result Date: 4/24/2022  XR CHEST PORTABLE 4/24/2022 4:27 PM HISTORY: ET tube, enteric tube  Technique: Single AP view of the chest COMPARISONS: 4/24/2022 FINDINGS: Endotracheal tube is identified with tip essentially touching the krysta. Enteric tube courses into the stomach with tip curled in the fundus back towards the gastroesophageal junction. The lungs are clear and well expanded. Heart size is stable. Pulmonary vasculature are nondilated. No pleural effusion or pneumothorax. 1. Endotracheal tube is deep, tip essentially touching the krysta. Lungs are clear and well expanded.  I would recommend 2-3 cm withdrawal for a more optimal positioning. The results of this exam were discussed with Antoni Dickens (ICU nursing) on 4/24/2022 at 1640 hours Signed by Dr Michael Mayes    XR CHEST PORTABLE    Result Date: 4/24/2022  XR CHEST PORTABLE 4/24/2022 1:17 PM HISTORY: ET tube placement  Technique: Single AP view of the chest COMPARISONS: 4/24/2022 FINDINGS: Status post endotracheal intubation. ET tube is in good position. Lungs are well-expanded. Enteric tube curls on itself in the midesophagus and then extends back up into the throat. Heart size is stable. Pulmonary vasculature are nondilated. 1. ET tube is in good position. Lungs are clear and well expanded. 2. Enteric tube curls on itself in the mid esophagus before extending back up into the throat. This will need repositioned. The results of this exam were discussed with Dr. Mary Ann Tee on 4/24/2022 at 1329 hours Signed by Dr Michael Mayes    XR CHEST PORTABLE    Result Date: 4/24/2022  XR CHEST PORTABLE 4/24/2022 12:34 PM HISTORY: Altered mental status  Technique: Single AP view of the chest COMPARISONS: Chest exam dated 4/1/2022 FINDINGS: No lung consolidation. No pleural effusion or pneumothorax. Cardiomediastinal silhouette and pulmonary vasculature are unremarkable. No acute bony abnormality. Left subclavian region vascular stent with additional stent projecting over the left humerus. No acute cardiopulmonary process. Signed by Dr Michael Mayes    VL DUP CAROTID BILATERAL    Result Date: 4/29/2022  Vascular Carotid Procedure  Demographics   Patient Name    Katey Marrero Age                   47   Patient Number  [id number]       Gender                Female   Visit Number    [id number]    Interpreting          Quiana Pineda                               Physician   Date of Birth   1970   Referring Physician   Linda Ceron   Accession       [id number]   [address], [place], [address]  Number  Procedure Type of Study:   Cerebral:Carotid, VL CAROTID BILATERAL. Indications for Study:Stroke. Risk Factors   - The patient's risk factor(s) include: diabetes mellitus and arterial     hypertension.   - Current - Every day. Allergies   - Penicillins.   - Stadol.   - Other allergy:(Lamisil Advanced). Technical Quality:Limited visualization due to patient on ventilator. Impression   There is mixed plaque visualized in the bilateral internal carotid  artery(ies). There is less than 50% stenosis in the right internal carotid artery. There is less than 50% stenosis of the left internal carotid artery. There is normal antegrade flow in the bilateral vertebral artery(ies). right side is very limited due to positioning, her chin is resting on her  right clavicle and she is vented. Signature   ----------------------------------------------------------------  Electronically signed by Bernabe Leon(Interpreting  physician) on 04/29/2022 03:20 PM  ----------------------------------------------------------------  Blood Pressure:Right arm 178/69 mmHg. Velocities are measured in cm/s ; Diameters are measured in mm Carotid Right Measurements +------------+-------+-------+--------+-------+------------+---------------+ ! Location    ! PSV    ! EDV    ! Angle   ! RI     !%Stenosis   ! Tortuosity     ! +------------+-------+-------+--------+-------+------------+---------------+ ! Prox CCA    !47.2   !       !60      !       !            !               ! +------------+-------+-------+--------+-------+------------+---------------+ ! Mid CCA     !47.2   !       !60      !       !            !               ! +------------+-------+-------+--------+-------+------------+---------------+ ! Prox ICA    !41     !7.46   !60      !0.82   !            !               ! +------------+-------+-------+--------+-------+------------+---------------+ ! Mid ICA     !62.7   !11.2   !60      !0.82   !            !               ! +------------+-------+-------+--------+-------+------------+---------------+ ! Dist ICA !75.2   !13.7   !60      !0.82   !            !               ! +------------+-------+-------+--------+-------+------------+---------------+ ! Prox ECA    !147    !       !61      !       !            !               ! +------------+-------+-------+--------+-------+------------+---------------+ ! Vertebral   !47.2   !13.7   !60      !0.71   !            !               ! +------------+-------+-------+--------+-------+------------+---------------+   - There is antegrade vertebral flow noted on the right side. - Additional Measurements:ICAPSV/CCAPSV 1.59. Carotid Left Measurements +------------+-------+-------+--------+-------+------------+---------------+ ! Location    ! PSV    ! EDV    ! Angle   ! RI     !%Stenosis   ! Tortuosity     ! +------------+-------+-------+--------+-------+------------+---------------+ ! Prox CCA    !81.4   !9.94   !60      !0.88   !            !               ! +------------+-------+-------+--------+-------+------------+---------------+ ! Mid CCA     !70.2   !7.46   !60      !0.89   !            !               ! +------------+-------+-------+--------+-------+------------+---------------+ ! Prox ICA    !69.4   !11.9   !60      !0.83   !            !               ! +------------+-------+-------+--------+-------+------------+---------------+ ! Mid ICA     !80.6   !19.6   !60      !0.76   !            !               ! +------------+-------+-------+--------+-------+------------+---------------+ ! Dist ICA    !70.3   !12.5   !50      !0.82   !            !               ! +------------+-------+-------+--------+-------+------------+---------------+ ! Prox ECA    !113    !       !61      !       !            !               ! +------------+-------+-------+--------+-------+------------+---------------+ ! Vertebral   !38.8   !       !60      !       !            !               ! +------------+-------+-------+--------+-------+------------+---------------+   - There is antegrade vertebral flow noted on the left side. - Additional Measurements:ICAPSV/CCAPSV 0.99. ICAEDV/CCAEDV 1.97. MRI BRAIN WO CONTRAST    Result Date: 4/29/2022  Examination. MRI BRAIN WO CONTRAST 4/29/2022 11:43 AM History: Seizure activity. The multiplanar, multisequence MR imaging of the brain is performed without contrast enhancement. There is no previous similar study for comparison. The correlation made with CT scan of the head dated 4/24/2022. The diffusion-weighted imaging sequence including the ADC map show an area of acute infarction involving the left posterior frontal, parietal and basal occipital lobe, the left middle cerebral artery territory. A small linear focus of T2 signal in the right posterior paraventricular white matter has no corresponding ADC map changes and may represent a subacute or chronic process. The targeted images of the mesial temporal lobe show moderate symmetrical mesial temporal atrophy without presence of any abnormal signal. This may be a process of general volume loss/atrophy. Moderately dilated ventricles, basal cisterns and the cortical sulci suggestive chronic volume loss/atrophy. The orbits are suboptimally evaluated. No obvious abnormality. Limited visualized optic nerve, optic chiasma and optic tracts are normal and symmetrical. The pituitary gland appears normal. The corpus callosum, the brainstem and cerebellum are normal. The FLAIR sequence images show significant periventricular T2 signal changes representing chronic white matter ischemia and transependymal CSF flow. The gradient recalled imaging sequence show normal flow void in the visualized intracranial vessels, particularly normal flow in the limited visualized left middle cerebral artery. There is mucosal thickening involving the ethmoid sinuses. The bilateral mastoid effusion. 1. Acute left middle cerebral territory infarct. 2. Chronic ischemic and atrophic changes.  3. The symmetrical mesial temporal atrophy is probably a part of the generalized volume loss. 4. Ethmoid sinusitis. Bilateral mastoid effusion. The above report was immediately called to the nurse in charge of the patient in CCU. Signed by Dr Abiodun Horvath    ECHO 2D [place]    Result Date: 4/30/2022  Transthoracic Echocardiography Report (TTE)  Demographics   Patient Name  [facility] Date of Study          04/29/2022   MRN           [id number]       Gender                 Female   Date of Birth 1970   Room Number            [facility]-0702   Age           47 year(s)   Height:       66 inches    Referring Physician    Qamar Ruiz   Weight:       144 pounds   Sonographer            Jessie Madera [facility]   BSA:          1.74 m^2     Interpreting Physician Marian Garzon MD   BMI:          23.24 kg/m^2  Procedure Type of Study   TTE procedure:ECHO NO CONTRAST WITH DOP/COLR. Study Location: Portable Technical Quality: Adequate visualization Patient Status: Inpatient Contrast Medium: Bubble Study. Rhythm: Within normal limits HR: 57 bpm BP: 178/69 mmHg Indications:Stroke. Conclusions   Summary  Mitral valve leaflets are mildly thickened with preserved leaflet  mobility. Mild aortic stenosis. Mean gradient 14 mm hg  Tricuspid valve is structurally normal.  Mild tricuspid regurgitation. Moderately dilated left atrium. Normal intact intra-atrial septum was noted with no evidence of  significant intra-atrial communications by color flow Doppler or by  agitated saline study. Normal left ventricular size with preserved LV function and an estimated  ejection fraction of approximately 55-60%. Moderate concentric left ventricular hypertrophy. No regional wall motion abnormalities.   Grade II diastolic dysfunction   Signature   ----------------------------------------------------------------  Electronically signed by Marian Garzon MD(Interpreting  physician) on 04/30/2022 11:02 AM  ----------------------------------------------------------------   Findings   Mitral Valve Mitral valve leaflets are mildly thickened with preserved leaflet  mobility. Aortic Valve  Mild aortic stenosis. Mean gradient 14 mm hg   Tricuspid Valve  Tricuspid valve is structurally normal.  Mild tricuspid regurgitation. Pulmonic Valve  The pulmonic valve was not well visualized . Left Atrium  Moderately dilated left atrium. Normal intact intra-atrial septum was noted with no evidence of  significant intra-atrial communications by color flow Doppler or by  agitated saline study. Left Ventricle  Normal left ventricular size with preserved LV function and an estimated  ejection fraction of approximately 55-60%. Moderate concentric left ventricular hypertrophy. No regional wall motion abnormalities. Grade II diastolic dysfunction   Right Atrium  Normal right atrial dimension with no evidence of thrombus or mass noted. Right Ventricle  Normal right ventricular size with preserved RV function. Pericardial Effusion  No evidence of significant pericardial effusion is noted. Pleural Effusion  No evidence of pleural effusion. Allergies   - Penicillins.   - Stadol.   - Other allergy:(Lamisil Advanced). 2D Measurements and Calculations(cm)   LVIDd: 4.4 cm                       LVIDs: 3.14 cm  IVSd: 1.41 cm  LVPWd: 1.43 cm                      AO Root Dimension: 2.4 cm  Rt. Vent.  Dimension: 2.64 cm        LA Dimension: 3.8 cm  % Ejection Fraction: 55 %           LA Area: 24.3 cm^2  LA Volume: 86.1 ml                  LV Systolic dimension: 7.29PR  LA Volume Index: 49 ml/m^2          LV PW diastolic: 5.39SN  LV dimension: 4.4 cm                LVOT diameter: 2 cm                                      RV Diastolic dimension: 8.09DA  LVEDV: 115 ml                       LVEDVI: 66 ml/m^2  LVESV:36.3 ml                       LVESVI: 21 ml/m^2  Cardic Output (CO): 5.53l/min  Doppler Measurements and Calculations   AV Peak Velocity:257 cm/s              MV Peak E-Wave: 82.3 cm/s  AV Mean Velocity:178 cm/s MV Peak A-Wave: 76.7 cm/s  AV Peak Gradient: 26.42 mmHg           MV E/A Ratio: 1.07 %  AV Mean Gradient: 14 mmHg              MV Mean velocity: 64.8cm/s  AV Area (Continuity):1.85 cm^2         MV Peak Gradient: 2.71 mmHg  AV VTI:52.4 cm/s                       MV Mean gradient: 2 mmHg  TR Velocity:207 cm/s  TR Gradient:17.14 mmHg   MV E' septal velocity: 5.66cm/s  MV E' lateral velocity:5.66 cm/s         Assessment   ESRD  DM2 with nephropathy on long-term insulin, uncontrolled  HTN  Hyponatremia  Hypokalemia  Secondary Hyperparathyroidism  Anemia CKD  Seizure disorder  Hypothyroidism       Plan:  Dialysis per routine scheduling   Ultrafiltration as tolerated  Wean ventilator per primary  Monitor labs  Neuro evaluation reviewed  Work-up reviewed to date, plans for PEG and tracheostomy noted now next week  Discussed with nursing, Dr. Shady Schilling MD  05/08/22  12:02 PM

## 2022-05-08 NOTE — PROGRESS NOTES
[address] [facility] Pharmacokinetic Monitoring Service - Vancomycin    Yamilet Starkey is a 47 y.o. female starting on vancomycin therapy for pneumonia (VAP). Pharmacy consulted by Dr Cole Klein for monitoring and adjustment. Target Concentration: Pre-Dialysis Concentration 21-24 mg/L  Additional Antimicrobials: Gentamicin    Pertinent Laboratory Values:    Wt Readings from Last 1 Encounters:   05/08/22 126 lb 9.6 oz (57.4 kg)     Temp Readings from Last 1 Encounters:   05/08/22 98.1 °F (36.7 °C) (Temporal)     Recent Labs     05/07/22  0021 05/08/22  0123   CREATININE 2.4* 3.2*   WBC 14.0* 15.5*       Pertinent Cultures:  Culture Date Source Results   5/6/22 Respiratory  MRSA  Raoultella ornithinolytica (klebsiella)  Proteus vulgaris   MRSA Nasal Swab:  respiratory culture positive for MRSA, no need for swab    Plan:  Concentration-guided dosing due to renal impairment and intermittent hemodialysis   Start vancomycin pulse dosing (1500mg load today, 750mg post-HD Monday)  Vancomycin concentration ordered for 5/11 @ 0800, prior to HD session   Pharmacy will continue to monitor patient and adjust therapy as indicated    Thank you for the consult,  Karmen Meigs, [facility]  5/8/2022 2:21 PM

## 2022-05-08 NOTE — PROGRESS NOTES
PRN Azeb Martin MD   11.25 mg at 05/03/22 1408    sodium chloride nebulizer 0.9 % solution 3 mL  3 mL Nebulization Q4H PRN Azeb Martin MD        propofol injection  5-50 mcg/kg/min IntraVENous Continuous Azeb Martin MD 9 mL/hr at 05/08/22 1155 25 mcg/kg/min at 05/08/22 1155    heparin (porcine) injection 5,000 Units  5,000 Units SubCUTAneous TID Azeb Martin MD   5,000 Units at 05/08/22 8782    Peppermint Spirit SPRT   Does not apply PRN Jillian Berkowitz, DO        dexmedetomidine (PRECEDEX) 400 mcg in sodium chloride 0.9 % 100 mL infusion  0.1-1.5 mcg/kg/hr IntraVENous Continuous Jillian Ericaa, DO   Stopped at 05/03/22 1143    menthol-zinc oxide (CALMOSEPTINE) 0.44-20.6 % ointment   Topical BID Azeb Martin MD   Given at 05/08/22 4259    sodium phosphate 10.5 mmol in sodium chloride 0.9 % 250 mL IVPB  0.16 mmol/kg IntraVENous PRN Patience Walter, DO        Or    sodium phosphate 21 mmol in sodium chloride 0.9 % 250 mL IVPB  0.32 mmol/kg IntraVENous PRN Patience Walter, DO        insulin lispro (HUMALOG) injection vial 0-12 Units  0-12 Units SubCUTAneous TID WC Jillian Ericaa, DO   4 Units at 05/08/22 1231    insulin lispro (HUMALOG) injection vial 0-6 Units  0-6 Units SubCUTAneous Nightly Jillian Lennoxlla, DO   1 Units at 05/06/22 2129    hydrALAZINE (APRESOLINE) tablet 100 mg  100 mg Oral 3 times per day Patience Walter, DO   100 mg at 05/07/22 2137    glucagon (rDNA) injection 1 mg  1 mg IntraMUSCular PRN Jillian Ericaa, DO        dextrose 5 % solution  100 mL/hr IntraVENous PRN Jillian Gazella, DO        glucose chewable tablet 16 g  4 tablet Oral PRN Jillian Ericaa, DO        dextrose bolus (hypoglycemia) 10% 125 mL  125 mL IntraVENous PRN Jillian Maldonadoa, DO   Stopped at 04/25/22 3410    Or    dextrose bolus (hypoglycemia) 10% 250 mL  250 mL IntraVENous PRN Jillian Berkowitz DO        sodium chloride flush 0.9 % injection 5-40 mL  5-40 mL IntraVENous 2 times per day Jillian Gazella, DO   10 mL at 05/08/22 2102    sodium chloride flush 0.9 % injection 5-40 mL  5-40 mL IntraVENous PRN Jillian Gazella, DO        0.9 % sodium chloride infusion   IntraVENous PRN Jillian Gazella, DO   Stopped at 05/03/22 2005    LORazepam (ATIVAN) injection 1 mg  1 mg IntraVENous Q5 Min PRN Jillian Gazella, DO        ondansetron (ZOFRAN-ODT) disintegrating tablet 4 mg  4 mg Oral Q8H PRN Jillian Gazella, DO        Or    ondansetron [facility]) injection 4 mg  4 mg IntraVENous Q6H PRN Jillian Gazella, DO        polyethylene glycol [facility]) packet 17 g  17 g Oral Daily PRN Jillian Gazella, DO        acetaminophen (TYLENOL) tablet 650 mg  650 mg Oral Q6H PRN Jillian Gazella, DO   650 mg at 04/25/22 6141    Or    acetaminophen (TYLENOL) suppository 650 mg  650 mg Rectal Q6H PRN Jillian Gazella, DO        levETIRAcetam (KEPPRA) 500 mg/100 mL IVPB  500 mg IntraVENous Q12H Jillian Gazella, DO   Stopped at 05/08/22 1253    chlorhexidine (PERIDEX) 0.12 % solution 15 mL  15 mL Mouth/Throat BID Jillian Gazella, DO   15 mL at 05/08/22 0904    famotidine (PEPCID) 20 mg in sodium chloride (PF) 10 mL injection  20 mg IntraVENous Daily Jillian Gazella, DO   20 mg at 05/08/22 3059    magic butt cream   Topical Q4H PRN Jillian Gazella, DO   Given at 05/03/22 0940    magnesium sulfate 1000 mg in dextrose 5% 100 mL IVPB  1,000 mg IntraVENous PRN Patience Walter, DO        potassium bicarb-citric acid (EFFER-K) effervescent tablet 40 mEq  40 mEq Oral PRN Patience Walter, DO        Or    potassium chloride 10 mEq/100 mL IVPB (Peripheral Line)  10 mEq IntraVENous PRN Mitra Augustin, DO   Stopped at 04/25/22 0996       Past Medical History:  Past Medical History:   Diagnosis Date    Acute ischemic stroke ([facility] 4/30/2022    Arthritis     Chronic kidney disease, stage 5, kidney failure (HCC)     Closed fracture of right shoulder girdle, with routine healing, subsequent encounter     DM (diabetes mellitus) type II controlled with renal manifestation ([facility] [address] 06/1993    Gastroparesis     GERD (gastroesophageal reflux disease)     Hemodialysis patient ([facility] [address]     dialysis on tues, thur, and sat at [facility] clinic    Hypertension     Hypothyroid     Nasal congestion     recent    Neuropathy     Noncompliance with medications     Palliative care patient 10/08/2019    Restless leg syndrome        Past Surgical History:  Past Surgical History:   Procedure Laterality Date    BREAST SURGERY Left 2008    infected milk gland removed    BREAST SURGERY Right 5/25/2021    WEDGE EXCISION RIGHT BREAST DUCT WITH LACRIMAL DUCT PROBE, PEC BLOCK performed by Elizabeth Acosta MD at [address], LAPAROSCOPIC      2008    COLONOSCOPY Left 9/17/2020    Dr Kim Walton hepatic flexure-Severe tortuosity with severe spasming, 1 yr recall    DIALYSIS FISTULA CREATION Left 1/25/2019    REVISION LEFT UPPER EXTREMITY AV ACCESS WITH LEFT BRACHIAL ARTERY TO LEFT AXILLARY VEIN WITH  INTERPOSITIONAL ARTEGRAFT   performed by Kyle Barrientos MD at [address]  2012    [address] Right 1/25/2019    INSERTION OF RIGHT INTERNAL JUGULAR HEMODIALYSIS CATHETER performed by Kyle Barrientos MD at [address] [facility]  08/17/2018    1.  Moderately increased stainable iron identified by special stain in Kupffer cells and occasional hepatocytes. Negative for evidence of significant portal or lobular inflammation.  Negative for evidence of significant fibrosis    UT COLONOSCOPY W/BIOPSY SINGLE/MULTIPLE N/A 10/20/2017    Dr Clayton Double prep-Tubular AP (-) dysplasia x 2--3 yr recall    UT EGD TRANSORAL BIOPSY SINGLE/MULTIPLE N/A 12/27/2016    Dr Ruthann Marie EGD TRANSORAL BIOPSY SINGLE/MULTIPLE N/A 10/20/2017    Dr Sosa Vaca (-)    TUBAL LIGATION      1993    VASCULAR SURGERY Left 2016    fistula by Dr Gaurang Arnold at [facility] VASCULAR SURGERY  10/02/2017    [facility]. Left upper fistulograms/venograms, balloon angioplasty cephalic vein arch 31E40 conquest.    VASCULAR SURGERY  2018    FISTULOGRAM    VASCULAR SURGERY  10/29/2018    [facility]. Left upper fistulograms/venograms    VASCULAR SURGERY  2018    [facility]. Arch aortogram,left upper arteriograms.  VASCULAR SURGERY  2019    [facility]. Removal of tunneled dialyis catheter right internal jugular vein.  VASCULAR SURGERY  2019    [facility]. Left upper extremity fistulogram including venography to the superior vena cava. Balloon angioplasty left subclavian vein with 10mm x 40mm conquest balloon. Balloon angioplasty mid/proximal upper arm cephalic vein with 29NB x 40mm conquest balloon. Venograms after balloon angioplasty. Stent left mid/proximal upper arm cephalic vein stenosis fluency 10mm x 60mm self-expanding covered stent. Balloon     VASCULAR SURGERY  2019    cont angioplasty stent with 10mm x 40mm conquest balloon. Completion venograms left upper extremity.        Family History  Family History   Problem Relation Age of Onset    Colon Cancer Mother          at 63    Colon Polyps Mother     Lung Cancer Father          at 79    Colon Polyps Father     Stomach Cancer Sister     Breast Cancer Sister 27    Depression Daughter 25        committed suicide    Liver Cancer Neg Hx     Liver Disease Neg Hx     Esophageal Cancer Neg Hx     Rectal Cancer Neg Hx        Social History  Social History     Socioeconomic History    Marital status: Single     Spouse name: Not on file    Number of children: 2    Years of education: Not on file    Highest education level: Not on file   Occupational History    Not on file   Tobacco Use    Smoking status: Current Every Day Smoker     Packs/day: 0.50     Years: 33.00     Pack years: 16.50     Types: Cigarettes    Smokeless tobacco: Never Used    Tobacco comment: NOW at 1/4 PD, started at age 24 and has averaged 2 PPD   Vaping Use 1614.29 ml   Output 100 ml   Net 1514.29 ml       Physical Exam  Vitals and nursing note reviewed. Constitutional:       Appearance: She is ill-appearing. Interventions: She is sedated and intubated. HENT:      Head: Normocephalic and atraumatic. Right Ear: External ear normal.      Left Ear: External ear normal.      Nose: Nose normal.      Mouth/Throat:      Mouth: Mucous membranes are moist.   Eyes:      Conjunctiva/sclera: Conjunctivae normal.   Cardiovascular:      Rate and Rhythm: Normal rate and regular rhythm. Heart sounds: Normal heart sounds. Pulmonary:      Effort: She is intubated. Breath sounds: Normal breath sounds. Abdominal:      General: Abdomen is flat. Palpations: Abdomen is soft. Musculoskeletal:         General: Swelling present. Cervical back: Neck supple. No rigidity. No muscular tenderness. Right lower leg: Edema present. Left lower leg: Edema present. Skin:     General: Skin is warm and dry. Labs:  BMP:   Recent Labs     05/06/22 0239 05/07/22 0021 05/08/22  0123    135* 136   K 4.8 4.0 4.8   CL 91* 88* 89*   CO2 27 28 27   PHOS 6.0* 4.9* 7.3*   BUN 65* 34* 58*   CREATININE 3.6* 2.4* 3.2*   CALCIUM 9.0 9.0 9.3     CBC:   Recent Labs     05/06/22 0239 05/07/22 0021 05/08/22  0123   WBC 8.7 14.0* 15.5*   HGB 10.3* 10.3* 9.8*   HCT 33.9* 34.2* 32.8*   MCV 93.4 93.4 95.9    261 250     LIVER PROFILE:   Recent Labs     05/06/22 0239 05/07/22  0021 05/08/22  0123   AST 13 15 13   ALT 6 7 7   BILITOT 0.3 <0.2 0.3   ALKPHOS 96 104 102     PT/INR:   Recent Labs     05/06/22 0239 05/07/22  0021 05/08/22  0123   PROTIME 13.5 13.3 13.8   INR 1.04 1.02 1.07     APTT: No results for input(s): APTT in the last 72 hours. BNP:  No results for input(s): BNP in the last 72 hours. Ionized Calcium:No results for input(s): IONCA in the last 72 hours.   Magnesium:  Recent Labs     05/06/22  0239 05/07/22  0021 05/08/22  0123   MG 2.6 2.3 2.6     Phosphorus:  Recent Labs     05/06/22  0239 05/07/22  0021 05/08/22  0123   PHOS 6.0* 4.9* 7.3*     HgbA1C: No results for input(s): LABA1C in the last 72 hours. Hepatic:   Recent Labs     05/06/22  0239 05/07/22  0021 05/08/22  0123   ALKPHOS 96 104 102   ALT 6 7 7   AST 13 15 13   PROT 6.0* 6.4* 6.3*   BILITOT 0.3 <0.2 0.3   LABALBU 3.0* 3.2* 3.2*     Lactic Acid: No results for input(s): LACTA in the last 72 hours. Troponin: No results for input(s): CKTOTAL, CKMB, TROPONINT in the last 72 hours. ABGs: No results for input(s): PH, PCO2, PO2, HCO3, O2SAT in the last 72 hours. CRP:  No results for input(s): CRP in the last 72 hours. Sed Rate:  No results for input(s): SEDRATE in the last 72 hours. Cultures:   No results for input(s): CULTURE in the last 72 hours. No results for input(s): BC, Caralee Roller in the last 72 hours. No results for input(s): CXSURG in the last 72 hours. Radiology reports as per the Radiologist  Radiology: CT Head WO Contrast    Result Date: 4/24/2022  CT HEAD WO CONTRAST 4/24/2022 2:01 PM HISTORY: Altered mental status COMPARISON: CT scan dated 11/18/2021 DOSE LENGTH PRODUCT: 766 mGy cm TECHNIQUE: Helical tomographic images of the brain were obtained without the use of intravenous contrast. Automated exposure control was also utilized to decrease patient radiation dose. FINDINGS: There is no evidence of evolving large vascular territory infarct. No visualized intra-axial or extra-axial hemorrhage. No mass lesion is identified. Normal size and configuration of the ventricular system. The basal cisterns are symmetric. Posterior fossa structures are unremarkable. The included orbits and their contents are unremarkable. Chronic paranasal sinus disease on the left. The visualized osseous structures and overlying soft tissues of the skull and face are unremarkable. 1. No acute intracranial process.  Signed by Dr Aurora Bensno    XR CHEST PORTABLE    Result Date: 4/24/2022  XR CHEST PORTABLE 4/24/2022 4:27 PM HISTORY: ET tube, enteric tube  Technique: Single AP view of the chest COMPARISONS: 4/24/2022 FINDINGS: Endotracheal tube is identified with tip essentially touching the krysta. Enteric tube courses into the stomach with tip curled in the fundus back towards the gastroesophageal junction. The lungs are clear and well expanded. Heart size is stable. Pulmonary vasculature are nondilated. No pleural effusion or pneumothorax. 1. Endotracheal tube is deep, tip essentially touching the krysta. Lungs are clear and well expanded. I would recommend 2-3 cm withdrawal for a more optimal positioning. The results of this exam were discussed with [facility] (ICU nursing) on 4/24/2022 at 1640 hours Signed by Dr Ashley Marie    XR CHEST PORTABLE    Result Date: 4/24/2022  XR CHEST PORTABLE 4/24/2022 1:17 PM HISTORY: ET tube placement  Technique: Single AP view of the chest COMPARISONS: 4/24/2022 FINDINGS: Status post endotracheal intubation. ET tube is in good position. Lungs are well-expanded. Enteric tube curls on itself in the midesophagus and then extends back up into the throat. Heart size is stable. Pulmonary vasculature are nondilated. 1. ET tube is in good position. Lungs are clear and well expanded. 2. Enteric tube curls on itself in the mid esophagus before extending back up into the throat. This will need repositioned. The results of this exam [facility] 4/24/2022 at 1329 hours Signed by Dr Ashley Marie    XR CHEST PORTABLE    Result Date: 4/24/2022  XR CHEST PORTABLE 4/24/2022 12:34 PM HISTORY: Altered mental status  Technique: Single AP view of the chest COMPARISONS: Chest exam dated 4/1/2022 FINDINGS: No lung consolidation. No pleural effusion or pneumothorax. Cardiomediastinal silhouette and pulmonary vasculature are unremarkable. No acute bony abnormality.  Left subclavian region vascular stent with additional stent projecting over the left humerus. No acute cardiopulmonary process. Signed by Dr Noa Brand. Continue Keppra. Neurology following.     Acute left ischemic MCA stroke. Continue supportive care.     Acute hypoxemic respiratory failure. Continue ventilatory support. Proceed with trach and PEG sometime this coming week.     Nonketotic hyperosmolar hyperglycemia. Resolved  Transitioned to subcutaneous insulin     End-stage renal disease. Maintenance dialysis.     Please document 38 minutes of critical care time for patient assessment, chart review, discussion with staff, .       Clint Iglesias, DO

## 2022-05-08 NOTE — PROGRESS NOTES
[facility] Neurology  [address] [place] [address] Drive, [address]  [place], [place] [address]  Phone [phone]     Neurology Progress Note  2022 1:36 PM  Information:   Patient Name: John Pedersen  :   1970  Age:   47 y.o. MRN:   [id number]  Account #:  [de-identified]  Admit Date:   2022  Today:  22     ADMIT DX:   Acute ischemic stroke [facility])    Subjective:     John Pedersen is a 48 YO woman with DM and ESRD on HD who was admitted  after an unresponsive episode at home and a generalized convulsive seizure in the ER. She had a very high blood sugar and respiratory failure requiring intubation. She has had little recovery. She had an MRI head  that showed an acute left MCA distribution infarct. Intermittently when sedation is held, she will have minor     Interval History:   She remains intubated, sedated, and unresponsive. No further seizures. She does waken and will move the left arm and both legs to command when off sedation.       Objective:     Past Medical History:  Past Medical History:   Diagnosis Date    Acute ischemic stroke ([facility] 2022    Arthritis     Chronic kidney disease, stage 5, kidney failure (HCC)     Closed fracture of right shoulder girdle, with routine healing, subsequent encounter     DM (diabetes mellitus) type II controlled with renal manifestation ([facility] 1993    Gastroparesis     GERD (gastroesophageal reflux disease)     Hemodialysis patient ([facility]     dialysis on tues, thur, and sat at [facility]    Hypertension     Hypothyroid     Nasal congestion     recent    Neuropathy     Noncompliance with medications     Palliative care patient 10/08/2019    Restless leg syndrome        Past Surgical History:   Procedure Laterality Date    BREAST SURGERY Left     infected milk gland removed    BREAST SURGERY Right 2021    WEDGE EXCISION RIGHT BREAST DUCT WITH LACRIMAL DUCT PROBE, PEC BLOCK performed by Greg Sanford MD at [address] [place] [address], arm cephalic vein stenosis fluency 10mm x 60mm self-expanding covered stent. Balloon     VASCULAR SURGERY  2019    cont angioplasty stent with 10mm x 40mm conquest balloon. Completion venograms left upper extremity. Family History   Problem Relation Age of Onset    Colon Cancer Mother          at 63    Colon Polyps Mother     Lung Cancer Father          at 79    Colon Polyps Father     Stomach Cancer Sister     Breast Cancer Sister 27    Depression Daughter 25        committed suicide    Liver Cancer Neg Hx     Liver Disease Neg Hx     Esophageal Cancer Neg Hx     Rectal Cancer Neg Hx        Social History     Socioeconomic History    Marital status: Single     Spouse name: Not on file    Number of children: 2    Years of education: Not on file    Highest education level: Not on file   Occupational History    Not on file   Tobacco Use    Smoking status: Current Every Day Smoker     Packs/day: 0.50     Years: 33.00     Pack years: 16.50     Types: Cigarettes    Smokeless tobacco: Never Used    Tobacco comment: NOW at 1/4 PD, started at age 24 and has averaged 2 PPD   Vaping Use    Vaping Use: Never used   Substance and Sexual Activity    Alcohol use: No    Drug use: Not Currently     Types: Marijuana Darliss Meeter)    Sexual activity: Not Currently     Partners: Male   Other Topics Concern    Not on file   Social History Narrative    Not on file     Social Determinants of Health     Financial Resource Strain:     Difficulty of Paying Living Expenses: Not on file   Food Insecurity:     Worried About Running Out of Food in the Last Year: Not on file    Kendy of Food in the Last Year: Not on file   Transportation Needs:     Lack of Transportation (Medical): Not on file    Lack of Transportation (Non-Medical):  Not on file   Physical Activity:     Days of Exercise per Week: Not on file    Minutes of Exercise per Session: Not on file   Stress:     Feeling of Stress : Not on file   Social Connections:     Frequency of Communication with Friends and Family: Not on file    Frequency of Social Gatherings with Friends and Family: Not on file    Attends Zoroastrian Services: Not on file    Active Member of Clubs or Organizations: Not on file    Attends Club or Organization Meetings: Not on file    Marital Status: Not on file   Intimate Partner Violence:     Fear of Current or Ex-Partner: Not on file    Emotionally Abused: Not on file    Physically Abused: Not on file    Sexually Abused: Not on file   Housing Stability:     Unable to Pay for Housing in the Last Year: Not on file    Number of Places Lived in the Last Year: Not on file    Unstable Housing in the Last Year: Not on file       Medications:   propofol 25 mcg/kg/min (05/08/22 1155)    dexmedetomidine HCl in NaCl Stopped (05/03/22 1143)    dextrose      sodium chloride Stopped (05/03/22 2005)      acetylcysteine  600 mg Inhalation BID    ipratropium-albuterol  1 ampule Inhalation Q4H    insulin glargine  20 Units SubCUTAneous BID    aspirin  81 mg Oral Daily    methylPREDNISolone  40 mg IntraVENous Q8H    heparin (porcine)  5,000 Units SubCUTAneous TID    menthol-zinc oxide   Topical BID    insulin lispro  0-12 Units SubCUTAneous TID WC    insulin lispro  0-6 Units SubCUTAneous Nightly    hydrALAZINE  100 mg Oral 3 times per day    sodium chloride flush  5-40 mL IntraVENous 2 times per day    levetiracetam  500 mg IntraVENous Q12H    chlorhexidine  15 mL Mouth/Throat BID    famotidine (PEPCID) injection  20 mg IntraVENous Daily       Diagnostic Studies:  Reviewed all labs/diagnositcs since last 24hrs:  Recent Results (from the past 24 hour(s))   POCT Glucose    Collection Time: 05/07/22  4:51 PM   Result Value Ref Range    POC Glucose 106 (H) 70 - 99 mg/dl    Performed on [employer]k    POCT Glucose    Collection Time: 05/07/22  8:39 PM   Result Value Ref Range    POC Glucose 87 70 - 99 mg/dl    Performed on AccuChek    CBC with Auto Differential    Collection Time: 05/08/22  1:23 AM   Result Value Ref Range    WBC 15.5 (H) 4.8 - 10.8 K/uL    RBC 3.42 (L) 4.20 - 5.40 M/uL    Hemoglobin 9.8 (L) 12.0 - 16.0 g/dL    Hematocrit 32.8 (L) 37.0 - 47.0 %    MCV 95.9 81.0 - 99.0 fL    MCH 28.7 27.0 - 31.0 pg    MCHC 29.9 (L) 33.0 - 37.0 g/dL    RDW 18.7 (H) 11.5 - 14.5 %    Platelets 801 259 - 136 K/uL    MPV 9.5 9.4 - 12.3 fL    Neutrophils % 92.1 (H) 50.0 - 65.0 %    Lymphocytes % 4.1 (L) 20.0 - 40.0 %    Monocytes % 3.3 0.0 - 10.0 %    Eosinophils % 0.0 0.0 - 5.0 %    Basophils % 0.1 0.0 - 1.0 %    Neutrophils Absolute 14.3 (H) 1.5 - 7.5 K/uL    Immature Granulocytes # 0.1 K/uL    Lymphocytes Absolute 0.6 (L) 1.1 - 4.5 K/uL    Monocytes Absolute 0.50 0.00 - 0.90 K/uL    Eosinophils Absolute 0.00 0.00 - 0.60 K/uL    Basophils Absolute 0.00 0.00 - 0.20 K/uL   Comprehensive Metabolic Panel    Collection Time: 05/08/22  1:23 AM   Result Value Ref Range    Sodium 136 136 - 145 mmol/L    Potassium 4.8 3.5 - 5.0 mmol/L    Chloride 89 (L) 98 - 111 mmol/L    CO2 27 22 - 29 mmol/L    Anion Gap 20 (H) 7 - 19 mmol/L    Glucose 91 74 - 109 mg/dL    BUN 58 (H) 6 - 20 mg/dL    CREATININE 3.2 (H) 0.5 - 0.9 mg/dL    GFR Non-African American 15 (A) >60    GFR  18 (L) >59    Calcium 9.3 8.6 - 10.0 mg/dL    Total Protein 6.3 (L) 6.6 - 8.7 g/dL    Albumin 3.2 (L) 3.5 - 5.2 g/dL    Total Bilirubin 0.3 0.2 - 1.2 mg/dL    Alkaline Phosphatase 102 35 - 104 U/L    ALT 7 5 - 33 U/L    AST 13 5 - 32 U/L   Magnesium    Collection Time: 05/08/22  1:23 AM   Result Value Ref Range    Magnesium 2.6 1.6 - 2.6 mg/dL   Phosphorus    Collection Time: 05/08/22  1:23 AM   Result Value Ref Range    Phosphorus 7.3 (H) 2.5 - 4.5 mg/dL   Protime-INR    Collection Time: 05/08/22  1:23 AM   Result Value Ref Range    Protime 13.8 12.0 - 14.6 sec    INR 1.07 0.88 - 1.18   POCT Glucose    Collection Time: 05/08/22  8:05 AM   Result Value Ref Range    POC Glucose 222 (H) 70 - 99 mg/dl    Performed on AccuChek    POCT Glucose    Collection Time: 05/08/22 12:22 PM   Result Value Ref Range    POC Glucose 205 (H) 70 - 99 mg/dl    Performed on AccuChek        Diet:  Diet NPO  ADULT TUBE FEEDING; Orogastric; Peptide Based High Protein; Continuous; 41; No; 0; Other (specify); no water flush    Examination:  Vitals: BP (!) 132/55   Pulse 101   Temp 98.1 °F (36.7 °C) (Temporal)   Resp 14   Ht 5' 6\" (1.676 m)   Wt 126 lb 9.6 oz (57.4 kg)   SpO2 98%   BMI 20.43 kg/m²      Intake/Output Summary (Last 24 hours) at 5/8/2022 1336  Last data filed at 5/8/2022 1000  Gross per 24 hour   Intake 1614.29 ml   Output 100 ml   Net 1514.29 ml     General appearance:  She is a chronically ill appearing woman who is intubated, sedated, and unresponsive in the CCU. HEENT: normocephalic, atraumatic. Lungs: clear to auscultation bilaterally  Heart: regular rate and rhythm, S1, S2 normal, no murmur, click, rub or gallop  Extremities: extremities normal, atraumatic, no cyanosis or edema  Neurologic:   She opens eyes to voice. She has a left gaze preference. Pupils are small and reactive. Corneal reflexes are present. Oculocephalic reflexes are absent. No blink to threat. No facial asymmetry. She does not move or withdraw extremities. Assessment:   1. Large left hemisphere stroke  2. Seizures  3. Acute hypoxemic respiratory failure  4. Diabetes with nonketotic hyperosmolar hyperglycemia  5. ESRD    Plan:   1. Continue Keppra  2. Continue ASA  3. Continue supportive care  4. Limit sedation as able  5. Trach/PEG planned next week     Discharge planning:   LTAC    Reviewed treatment plans with the patient and/or family. 25 minutes spent in face to face interaction and coordination of care.      Electronically signed by James Cueto MD on 5/8/2022 at 1:36 PM

## 2022-05-08 NOTE — PROGRESS NOTES
Pharmacy Note  Aminoglycoside Consult    Lei Barclay is a 47 y.o. female ordered gentamicin for pneumonia (VAP); consult received from Dr. Winsome Aguiar to manage therapy. Also receiving vancomycin. Principal Problem:    Acute ischemic stroke [facility])  Active Problems:    Weakness    Severe malnutrition (HCC)    Gastroesophageal reflux disease without esophagitis    Acquired hypothyroidism    Anemia in chronic kidney disease (CKD)    Type 2 diabetes mellitus with chronic kidney disease on chronic dialysis, with long-term current use of insulin (HCC)    Acute encephalopathy    Respiratory failure ([facility]    Uncontrolled type 2 diabetes mellitus with complication, with long-term current use of insulin (HCC)    ESRD (end stage renal disease) on dialysis ([facility]    PVD (peripheral vascular disease) ([facility]    ESRD on hemodialysis ([facility]    Hyponatremia    Hyperglycemia due to type 2 diabetes mellitus ([facility]    Uncontrolled hypertension    Seizure ([facility]    Palliative care patient    Uncontrolled type 2 diabetes mellitus with hyperosmolar nonketotic hyperglycemia (HCC)    Altered mental state    Acute hypoxemic respiratory failure (HCC)    Generalized weakness    UTI (urinary tract infection)    Closed fracture of left distal femur ([facility])    History of infection with vancomycin resistant Enterococcus (VRE)  Resolved Problems:    * No resolved hospital problems.  *      Allergies:  Penicillins, Lamisil advanced [terbinafine], Reglan [metoclopramide], and Stadol [butorphanol]     Temp max: 99    Recent Labs     05/07/22  0021 05/08/22  0123   BUN 34* 58*       Recent Labs     05/07/22  0021 05/08/22  0123   CREATININE 2.4* 3.2*       Recent Labs     05/07/22  0021 05/08/22  0123   WBC 14.0* 15.5*         Intake/Output Summary (Last 24 hours) at 5/8/2022 1501  Last data filed at 5/8/2022 1000  Gross per 24 hour   Intake 1164.29 ml   Output 100 ml   Net 1064.29 ml       Culture Date Source Results   5/6/22 respiratory MRSA  Raoultella

## 2022-05-09 LAB
ALBUMIN SERPL-MCNC: 2.7 G/DL (ref 3.5–5.2)
ALP BLD-CCNC: 85 U/L (ref 35–104)
ALT SERPL-CCNC: 6 U/L (ref 5–33)
ANION GAP SERPL CALCULATED.3IONS-SCNC: 23 MMOL/L (ref 7–19)
AST SERPL-CCNC: 13 U/L (ref 5–32)
BASOPHILS ABSOLUTE: 0 K/UL (ref 0–0.2)
BASOPHILS RELATIVE PERCENT: 0.3 % (ref 0–1)
BILIRUB SERPL-MCNC: 0.3 MG/DL (ref 0.2–1.2)
BUN BLDV-MCNC: 76 MG/DL (ref 6–20)
CALCIUM SERPL-MCNC: 8.5 MG/DL (ref 8.6–10)
CHLORIDE BLD-SCNC: 90 MMOL/L (ref 98–111)
CO2: 23 MMOL/L (ref 22–29)
CREAT SERPL-MCNC: 4 MG/DL (ref 0.5–0.9)
CULTURE, RESPIRATORY: ABNORMAL
EOSINOPHILS ABSOLUTE: 0.1 K/UL (ref 0–0.6)
EOSINOPHILS RELATIVE PERCENT: 0.6 % (ref 0–5)
GENTAMICIN DOSE AMOUNT: NORMAL
GENTAMICIN RANDOM: 3 UG/ML
GFR AFRICAN AMERICAN: 14
GFR NON-AFRICAN AMERICAN: 12
GLUCOSE BLD-MCNC: 102 MG/DL (ref 70–99)
GLUCOSE BLD-MCNC: 131 MG/DL (ref 70–99)
GLUCOSE BLD-MCNC: 176 MG/DL (ref 70–99)
GLUCOSE BLD-MCNC: 56 MG/DL (ref 70–99)
GLUCOSE BLD-MCNC: 58 MG/DL (ref 70–99)
GLUCOSE BLD-MCNC: 74 MG/DL (ref 74–109)
GRAM STAIN RESULT: ABNORMAL
HCT VFR BLD CALC: 27.5 % (ref 37–47)
HEMOGLOBIN: 8.1 G/DL (ref 12–16)
IMMATURE GRANULOCYTES #: 0 K/UL
INR BLD: 1.09 (ref 0.88–1.18)
LYMPHOCYTES ABSOLUTE: 1 K/UL (ref 1.1–4.5)
LYMPHOCYTES RELATIVE PERCENT: 8.5 % (ref 20–40)
MAGNESIUM: 2.5 MG/DL (ref 1.6–2.6)
MCH RBC QN AUTO: 28.1 PG (ref 27–31)
MCHC RBC AUTO-ENTMCNC: 29.5 G/DL (ref 33–37)
MCV RBC AUTO: 95.5 FL (ref 81–99)
MONOCYTES ABSOLUTE: 0.6 K/UL (ref 0–0.9)
MONOCYTES RELATIVE PERCENT: 5.1 % (ref 0–10)
MRSA SCREEN RT-PCR: DETECTED
NEUTROPHILS ABSOLUTE: 9.9 K/UL (ref 1.5–7.5)
NEUTROPHILS RELATIVE PERCENT: 85.2 % (ref 50–65)
ORGANISM: ABNORMAL
PDW BLD-RTO: 19 % (ref 11.5–14.5)
PERFORMED ON: ABNORMAL
PHOSPHORUS: 8.3 MG/DL (ref 2.5–4.5)
PLATELET # BLD: 237 K/UL (ref 130–400)
PMV BLD AUTO: 9.6 FL (ref 9.4–12.3)
POTASSIUM SERPL-SCNC: 4.6 MMOL/L (ref 3.5–5)
PROTHROMBIN TIME: 14.1 SEC (ref 12–14.6)
RBC # BLD: 2.88 M/UL (ref 4.2–5.4)
SODIUM BLD-SCNC: 136 MMOL/L (ref 136–145)
TOTAL PROTEIN: 5.6 G/DL (ref 6.6–8.7)
WBC # BLD: 11.7 K/UL (ref 4.8–10.8)

## 2022-05-09 PROCEDURE — 85025 COMPLETE CBC W/AUTO DIFF WBC: CPT

## 2022-05-09 PROCEDURE — 2500000003 HC RX 250 WO HCPCS: Performed by: INTERNAL MEDICINE

## 2022-05-09 PROCEDURE — 2100000000 HC CCU R&B

## 2022-05-09 PROCEDURE — 87641 MR-STAPH DNA AMP PROBE: CPT

## 2022-05-09 PROCEDURE — 6370000000 HC RX 637 (ALT 250 FOR IP): Performed by: INTERNAL MEDICINE

## 2022-05-09 PROCEDURE — 84100 ASSAY OF PHOSPHORUS: CPT

## 2022-05-09 PROCEDURE — 6360000002 HC RX W HCPCS: Performed by: INTERNAL MEDICINE

## 2022-05-09 PROCEDURE — 2580000003 HC RX 258: Performed by: INTERNAL MEDICINE

## 2022-05-09 PROCEDURE — 83735 ASSAY OF MAGNESIUM: CPT

## 2022-05-09 PROCEDURE — 8010000000 HC HEMODIALYSIS ACUTE INPT

## 2022-05-09 PROCEDURE — 94003 VENT MGMT INPAT SUBQ DAY: CPT

## 2022-05-09 PROCEDURE — 80053 COMPREHEN METABOLIC PANEL: CPT

## 2022-05-09 PROCEDURE — 80170 ASSAY OF GENTAMICIN: CPT

## 2022-05-09 PROCEDURE — 2700000000 HC OXYGEN THERAPY PER DAY

## 2022-05-09 PROCEDURE — 99221 1ST HOSP IP/OBS SF/LOW 40: CPT | Performed by: SURGERY

## 2022-05-09 PROCEDURE — 99231 SBSQ HOSP IP/OBS SF/LOW 25: CPT

## 2022-05-09 PROCEDURE — 99232 SBSQ HOSP IP/OBS MODERATE 35: CPT | Performed by: PSYCHIATRY & NEUROLOGY

## 2022-05-09 PROCEDURE — 36415 COLL VENOUS BLD VENIPUNCTURE: CPT

## 2022-05-09 PROCEDURE — 94640 AIRWAY INHALATION TREATMENT: CPT

## 2022-05-09 PROCEDURE — 85610 PROTHROMBIN TIME: CPT

## 2022-05-09 PROCEDURE — 82947 ASSAY GLUCOSE BLOOD QUANT: CPT

## 2022-05-09 RX ADMIN — VANCOMYCIN HYDROCHLORIDE 750 MG: 750 INJECTION, POWDER, LYOPHILIZED, FOR SOLUTION INTRAVENOUS at 14:26

## 2022-05-09 RX ADMIN — IPRATROPIUM BROMIDE AND ALBUTEROL SULFATE 1 AMPULE: 2.5; .5 SOLUTION RESPIRATORY (INHALATION) at 15:01

## 2022-05-09 RX ADMIN — IPRATROPIUM BROMIDE AND ALBUTEROL SULFATE 1 AMPULE: 2.5; .5 SOLUTION RESPIRATORY (INHALATION) at 11:19

## 2022-05-09 RX ADMIN — HEPARIN SODIUM 5000 UNITS: 5000 INJECTION INTRAVENOUS; SUBCUTANEOUS at 14:16

## 2022-05-09 RX ADMIN — ACETYLCYSTEINE 600 MG: 200 SOLUTION ORAL; RESPIRATORY (INHALATION) at 07:00

## 2022-05-09 RX ADMIN — HYDRALAZINE HYDROCHLORIDE 100 MG: 50 TABLET, FILM COATED ORAL at 14:16

## 2022-05-09 RX ADMIN — HYDRALAZINE HYDROCHLORIDE 100 MG: 50 TABLET, FILM COATED ORAL at 05:18

## 2022-05-09 RX ADMIN — HEPARIN SODIUM 5000 UNITS: 5000 INJECTION INTRAVENOUS; SUBCUTANEOUS at 21:18

## 2022-05-09 RX ADMIN — HEPARIN SODIUM 5000 UNITS: 5000 INJECTION INTRAVENOUS; SUBCUTANEOUS at 08:24

## 2022-05-09 RX ADMIN — Medication 10 ML: at 21:20

## 2022-05-09 RX ADMIN — DEXTROSE MONOHYDRATE 125 ML: 100 INJECTION, SOLUTION INTRAVENOUS at 12:41

## 2022-05-09 RX ADMIN — GENTAMICIN SULFATE 175 MG: 40 INJECTION, SOLUTION INTRAMUSCULAR; INTRAVENOUS at 14:26

## 2022-05-09 RX ADMIN — IPRATROPIUM BROMIDE AND ALBUTEROL SULFATE 1 AMPULE: 2.5; .5 SOLUTION RESPIRATORY (INHALATION) at 18:27

## 2022-05-09 RX ADMIN — Medication 10 ML: at 08:25

## 2022-05-09 RX ADMIN — ANORECTAL OINTMENT: 15.7; .44; 24; 20.6 OINTMENT TOPICAL at 18:21

## 2022-05-09 RX ADMIN — IPRATROPIUM BROMIDE AND ALBUTEROL SULFATE 1 AMPULE: 2.5; .5 SOLUTION RESPIRATORY (INHALATION) at 22:14

## 2022-05-09 RX ADMIN — ANORECTAL OINTMENT: 15.7; .44; 24; 20.6 OINTMENT TOPICAL at 00:00

## 2022-05-09 RX ADMIN — DEXTROSE MONOHYDRATE 125 ML: 100 INJECTION, SOLUTION INTRAVENOUS at 08:31

## 2022-05-09 RX ADMIN — INSULIN GLARGINE 20 UNITS: 100 INJECTION, SOLUTION SUBCUTANEOUS at 21:19

## 2022-05-09 RX ADMIN — CHLORHEXIDINE GLUCONATE 15 ML: 1.2 RINSE ORAL at 08:24

## 2022-05-09 RX ADMIN — IPRATROPIUM BROMIDE AND ALBUTEROL SULFATE 1 AMPULE: 2.5; .5 SOLUTION RESPIRATORY (INHALATION) at 07:00

## 2022-05-09 RX ADMIN — SODIUM CHLORIDE, PRESERVATIVE FREE 20 MG: 5 INJECTION INTRAVENOUS at 08:24

## 2022-05-09 RX ADMIN — ACETYLCYSTEINE 600 MG: 200 SOLUTION ORAL; RESPIRATORY (INHALATION) at 18:27

## 2022-05-09 RX ADMIN — LEVETIRACETAM 500 MG: 5 INJECTION INTRAVENOUS at 01:17

## 2022-05-09 RX ADMIN — INSULIN LISPRO 1 UNITS: 100 INJECTION, SOLUTION INTRAVENOUS; SUBCUTANEOUS at 21:19

## 2022-05-09 RX ADMIN — HYDRALAZINE HYDROCHLORIDE 100 MG: 50 TABLET, FILM COATED ORAL at 21:18

## 2022-05-09 RX ADMIN — LEVETIRACETAM 500 MG: 5 INJECTION INTRAVENOUS at 13:03

## 2022-05-09 RX ADMIN — IPRATROPIUM BROMIDE AND ALBUTEROL SULFATE 1 AMPULE: 2.5; .5 SOLUTION RESPIRATORY (INHALATION) at 02:03

## 2022-05-09 RX ADMIN — PROPOFOL 25 MCG/KG/MIN: 10 INJECTION, EMULSION INTRAVENOUS at 15:23

## 2022-05-09 RX ADMIN — CHLORHEXIDINE GLUCONATE 15 ML: 1.2 RINSE ORAL at 21:19

## 2022-05-09 RX ADMIN — ASPIRIN 81 MG 81 MG: 81 TABLET ORAL at 08:24

## 2022-05-09 ASSESSMENT — PULMONARY FUNCTION TESTS
PIF_VALUE: 17
PIF_VALUE: 17
PIF_VALUE: 19
PIF_VALUE: 16
PIF_VALUE: 17
PIF_VALUE: 17
PIF_VALUE: 18
PIF_VALUE: 19
PIF_VALUE: 16
PIF_VALUE: 17
PIF_VALUE: 23
PIF_VALUE: 19
PIF_VALUE: 16
PIF_VALUE: 17
PIF_VALUE: 17
PIF_VALUE: 27
PIF_VALUE: 17
PIF_VALUE: 18
PIF_VALUE: 17
PIF_VALUE: 17
PIF_VALUE: 23
PIF_VALUE: 16
PIF_VALUE: 19
PIF_VALUE: 17
PIF_VALUE: 27
PIF_VALUE: 17
PIF_VALUE: 23
PIF_VALUE: 19

## 2022-05-09 ASSESSMENT — PAIN SCALES - GENERAL
PAINLEVEL_OUTOF10: 0

## 2022-05-09 NOTE — PROGRESS NOTES
Pharmacy Aminoglycoside Consult    Aminoglycoside: Gentamicin  Day: 2  Current Dosing: Pulse dosing with HD    Temp max: 99    Estimated Creatinine Clearance: 15 mL/min (A) (based on SCr of 4 mg/dL (H)). Recent Labs     05/08/22  0123 05/09/22  0130   BUN 58* 76*       Recent Labs     05/08/22  0123 05/09/22  0130   CREATININE 3.2* 4.0*       Recent Labs     05/08/22  0123 05/09/22  0130   WBC 15.5* 11.7*     Culture Date Source Results   5/6/22 respiratory MRSA  Raoultella ornithinolytica (klebsiella)  Proteus vulgaris     Pre-HD level on 5/9 @0742:   3    ASSESSMENT/PLAN: After dialysis today, redose with gentamicin 175mg (3mg/kg) IV X1.  Gentamicin pre-HD level to be drawn Wednesday 5/11 early am.    Electronically signed by RAHUL Romero [facility] on 5/9/2022 at 11:03 AM

## 2022-05-09 NOTE — PROGRESS NOTES
[facility]  [address] [place] [address] Drive, [address]  [place], [place] [address]  Phone [phone]     Neurology Progress Note  2022 9:26 AM  Information:   Patient Name: Maia Farias  :   1970  Age:   47 y.o. MRN:   [id number]  Account #:  [de-identified]  Admit Date:   2022  Today:  22     ADMIT DX:   Acute ischemic stroke [facility])    Subjective:     Maia Farias is a 48 YO woman with DM and ESRD on HD who was admitted  after an unresponsive episode at home and a generalized convulsive seizure in the ER. She had a very high blood sugar and respiratory failure requiring intubation. She has had little recovery. She had an MRI head  that showed an acute left MCA distribution infarct. Intermittently when sedation is held, she will have minor responsiveness. Interval History:   She remains sedated and minimally responsive on a ventilator in the CCU. She is presently on 40 mcg/kg/hr Propofol.     Objective:     Past Medical History:  Past Medical History:   Diagnosis Date    Acute ischemic stroke ([facility] 2022    Arthritis     Chronic kidney disease, stage 5, kidney failure (HCC)     Closed fracture of right shoulder girdle, with routine healing, subsequent encounter     DM (diabetes mellitus) type II controlled with renal manifestation ([facility] 1993    Gastroparesis     GERD (gastroesophageal reflux disease)     Hemodialysis patient ([facility]     dialysis on , and sat at [facility] clinic    Hypertension     Hypothyroid     Nasal congestion     recent    Neuropathy     Noncompliance with medications     Palliative care patient 10/08/2019    Restless leg syndrome        Past Surgical History:   Procedure Laterality Date    BREAST SURGERY Left     infected milk gland removed    BREAST SURGERY Right 2021    WEDGE EXCISION RIGHT BREAST DUCT WITH LACRIMAL DUCT PROBE, PEC BLOCK performed by Nanci Mendez MD at [address] [place] [address]          COLONOSCOPY Left 9/17/2020    Dr Olympia Essex hepatic flexure-Severe tortuosity with severe spasming, 1 yr recall    DIALYSIS FISTULA CREATION Left 1/25/2019    REVISION LEFT UPPER EXTREMITY AV ACCESS WITH LEFT BRACHIAL ARTERY TO LEFT AXILLARY VEIN WITH  INTERPOSITIONAL ARTEGRAFT   performed by Joshua Dos Santos MD at [address]  2012    [address] Right 1/25/2019    INSERTION OF RIGHT INTERNAL JUGULAR HEMODIALYSIS CATHETER performed by Joshua Dos Santos MD at [facility] [facility]  08/17/2018    1.  Moderately increased stainable iron identified by special stain in Kupffer cells and occasional hepatocytes. Negative for evidence of significant portal or lobular inflammation. Negative for evidence of significant fibrosis    IL COLONOSCOPY W/BIOPSY SINGLE/MULTIPLE N/A 10/20/2017    Dr Jax Donato prep-Tubular AP (-) dysplasia x 2--3 yr recall    IL EGD TRANSORAL BIOPSY SINGLE/MULTIPLE N/A 12/27/2016    Dr Javier Dyson EGD TRANSORAL BIOPSY SINGLE/MULTIPLE N/A 10/20/2017    Dr Gerard Darling (-)   [address] [facility] VASCULAR SURGERY Left 2016    fistula by Dr Dave Parham at [address]  10/02/2017    [facility]. Left upper fistulograms/venograms, balloon angioplasty cephalic vein arch 27T25 conquest.    VASCULAR SURGERY  08/2018    FISTULOGRAM    VASCULAR SURGERY  10/29/2018    [facility]. Left upper fistulograms/venograms    VASCULAR SURGERY  12/19/2018    [facility]. Arch aortogram,left upper arteriograms.  VASCULAR SURGERY  03/06/2019    [facility]. Removal of tunneled dialyis catheter right internal jugular vein.  VASCULAR SURGERY  08/28/2019    [facility]. Left upper extremity fistulogram including venography to the superior vena cava. Balloon angioplasty left subclavian vein with 10mm x 40mm conquest balloon. Balloon angioplasty mid/proximal upper arm cephalic vein with 99LM x 40mm conquest balloon. Venograms after balloon angioplasty. Stent left mid/proximal upper arm cephalic vein stenosis fluency 10mm x 60mm self-expanding covered stent. Balloon     VASCULAR SURGERY  2019    cont angioplasty stent with 10mm x 40mm conquest balloon. Completion venograms left upper extremity. Family History   Problem Relation Age of Onset    Colon Cancer Mother          at 63    Colon Polyps Mother     Lung Cancer Father          at 79    Colon Polyps Father     Stomach Cancer Sister     Breast Cancer Sister 27    Depression Daughter 25        committed suicide    Liver Cancer Neg Hx     Liver Disease Neg Hx     Esophageal Cancer Neg Hx     Rectal Cancer Neg Hx        Social History     Socioeconomic History    Marital status: Single     Spouse name: Not on file    Number of children: 2    Years of education: Not on file    Highest education level: Not on file   Occupational History    Not on file   Tobacco Use    Smoking status: Current Every Day Smoker     Packs/day: 0.50     Years: 33.00     Pack years: 16.50     Types: Cigarettes    Smokeless tobacco: Never Used    Tobacco comment: NOW at 1/4 PD, started at age 24 and has averaged 2 PPD   Vaping Use    Vaping Use: Never used   Substance and Sexual Activity    Alcohol use: No    Drug use: Not Currently     Types: Marijuana Dena Aver)    Sexual activity: Not Currently     Partners: Male   Other Topics Concern    Not on file   Social History Narrative    Not on file     Social Determinants of Health     Financial Resource Strain:     Difficulty of Paying Living Expenses: Not on file   Food Insecurity:     Worried About Running Out of Food in the Last Year: Not on file    Kendy of Food in the Last Year: Not on file   Transportation Needs:     Lack of Transportation (Medical): Not on file    Lack of Transportation (Non-Medical):  Not on file   Physical Activity:     Days of Exercise per Week: Not on file    Minutes of Exercise per Session: Not on file   Stress:     Feeling of Stress : Not on file   Social Connections:  Frequency of Communication with Friends and Family: Not on file    Frequency of Social Gatherings with Friends and Family: Not on file    Attends [employer] Services: Not on file    Active Member of Clubs or Organizations: Not on file    Attends Club or Organization Meetings: Not on file    Marital Status: Not on file   Intimate Partner Violence:     Fear of Current or Ex-Partner: Not on file    Emotionally Abused: Not on file    Physically Abused: Not on file    Sexually Abused: Not on file   Housing Stability:     Unable to Pay for Housing in the Last Year: Not on file    Number of Places Lived in the Last Year: Not on file    Unstable Housing in the Last Year: Not on file       Medications:   propofol 30 mcg/kg/min (05/09/22 0600)    dexmedetomidine HCl in NaCl Stopped (05/03/22 1143)    dextrose      sodium chloride Stopped (05/03/22 2005)      vancomycin (VANCOCIN) intermittent dosing (placeholder)   Other RX Placeholder    vancomycin  750 mg IntraVENous Once    aminoglycoside intermittent dosing (placeholder)   Other RX Placeholder    acetylcysteine  600 mg Inhalation BID    ipratropium-albuterol  1 ampule Inhalation Q4H    insulin glargine  20 Units SubCUTAneous BID    aspirin  81 mg Oral Daily    heparin (porcine)  5,000 Units SubCUTAneous TID    menthol-zinc oxide   Topical BID    insulin lispro  0-12 Units SubCUTAneous TID WC    insulin lispro  0-6 Units SubCUTAneous Nightly    hydrALAZINE  100 mg Oral 3 times per day    sodium chloride flush  5-40 mL IntraVENous 2 times per day    levetiracetam  500 mg IntraVENous Q12H    chlorhexidine  15 mL Mouth/Throat BID    famotidine (PEPCID) injection  20 mg IntraVENous Daily       Diagnostic Studies:  Reviewed all labs/diagnositcs since last 24hrs:  Recent Results (from the past 24 hour(s))   POCT Glucose    Collection Time: 05/08/22 12:22 PM   Result Value Ref Range    POC Glucose 205 (H) 70 - 99 mg/dl    Performed on [employer] POCT Glucose    Collection Time: 05/08/22  5:04 PM   Result Value Ref Range    POC Glucose 222 (H) 70 - 99 mg/dl    Performed on [employer]    POCT Glucose    Collection Time: 05/08/22  7:56 PM   Result Value Ref Range    POC Glucose 179 (H) 70 - 99 mg/dl    Performed on [employer]    Gentamicin Level, Random    Collection Time: 05/08/22  8:00 PM   Result Value Ref Range    Gentamicin Dose Amount Unknown    CBC with Auto Differential    Collection Time: 05/09/22  1:30 AM   Result Value Ref Range    WBC 11.7 (H) 4.8 - 10.8 K/uL    RBC 2.88 (L) 4.20 - 5.40 M/uL    Hemoglobin 8.1 (L) 12.0 - 16.0 g/dL    Hematocrit 27.5 (L) 37.0 - 47.0 %    MCV 95.5 81.0 - 99.0 fL    MCH 28.1 27.0 - 31.0 pg    MCHC 29.5 (L) 33.0 - 37.0 g/dL    RDW 19.0 (H) 11.5 - 14.5 %    Platelets 177 248 - 308 K/uL    MPV 9.6 9.4 - 12.3 fL    Neutrophils % 85.2 (H) 50.0 - 65.0 %    Lymphocytes % 8.5 (L) 20.0 - 40.0 %    Monocytes % 5.1 0.0 - 10.0 %    Eosinophils % 0.6 0.0 - 5.0 %    Basophils % 0.3 0.0 - 1.0 %    Neutrophils Absolute 9.9 (H) 1.5 - 7.5 K/uL    Immature Granulocytes # 0.0 K/uL    Lymphocytes Absolute 1.0 (L) 1.1 - 4.5 K/uL    Monocytes Absolute 0.60 0.00 - 0.90 K/uL    Eosinophils Absolute 0.10 0.00 - 0.60 K/uL    Basophils Absolute 0.00 0.00 - 0.20 K/uL   Comprehensive Metabolic Panel    Collection Time: 05/09/22  1:30 AM   Result Value Ref Range    Sodium 136 136 - 145 mmol/L    Potassium 4.6 3.5 - 5.0 mmol/L    Chloride 90 (L) 98 - 111 mmol/L    CO2 23 22 - 29 mmol/L    Anion Gap 23 (H) 7 - 19 mmol/L    Glucose 74 74 - 109 mg/dL    BUN 76 (H) 6 - 20 mg/dL    CREATININE 4.0 (H) 0.5 - 0.9 mg/dL    GFR Non- 12 (A) >60    GFR  14 (L) >59    Calcium 8.5 (L) 8.6 - 10.0 mg/dL    Total Protein 5.6 (L) 6.6 - 8.7 g/dL    Albumin 2.7 (L) 3.5 - 5.2 g/dL    Total Bilirubin 0.3 0.2 - 1.2 mg/dL    Alkaline Phosphatase 85 35 - 104 U/L    ALT 6 5 - 33 U/L    AST 13 5 - 32 U/L   Magnesium    Collection Time: 05/09/22 1:30 AM   Result Value Ref Range    Magnesium 2.5 1.6 - 2.6 mg/dL   Phosphorus    Collection Time: 05/09/22  1:30 AM   Result Value Ref Range    Phosphorus 8.3 (H) 2.5 - 4.5 mg/dL   Protime-INR    Collection Time: 05/09/22  1:30 AM   Result Value Ref Range    Protime 14.1 12.0 - 14.6 sec    INR 1.09 0.88 - 1.18   POCT Glucose    Collection Time: 05/09/22  7:58 AM   Result Value Ref Range    POC Glucose 56 (L) 70 - 99 mg/dl    Performed on [employer]        Diet:  Diet NPO  ADULT TUBE FEEDING; Orogastric; Peptide Based High Protein; Continuous; 41; No; 0; Other (specify); no water flush    Examination:  Vitals: BP (!) 135/58   Pulse 87   Temp 97.6 °F (36.4 °C) (Temporal)   Resp 17   Ht 5' 6\" (1.676 m)   Wt 130 lb 11.2 oz (59.3 kg)   SpO2 98%   BMI 21.10 kg/m²      Intake/Output Summary (Last 24 hours) at 5/9/2022 0926  Last data filed at 5/9/2022 0600  Gross per 24 hour   Intake 1916.62 ml   Output    Net 1916.62 ml     General appearance:  She is a chronically ill appearing woman who is intubated, sedated, and unresponsive in the CCU. HEENT: normocephalic, atraumatic. Lungs: clear to auscultation bilaterally  Heart: regular rate and rhythm, S1, S2 normal, no murmur, click, rub or gallop  Extremities: extremities normal, atraumatic, no cyanosis or edema  Neurologic:   She opens eyes to voice. She has a left gaze preference.  Pupils are small and reactive.  Corneal reflexes are present.  Oculocephalic reflexes are absent.  No blink to threat.  No facial asymmetry.  She does not move or withdraw extremities.      Assessment:   1.         Large left hemisphere stroke  2.         Seizures  3.         Acute hypoxemic respiratory failure  4.         Diabetes with nonketotic hyperosmolar hyperglycemia  5.         ESRD    Plan:   1.         Continue Keppra  2.         Continue ASA  3.         Continue supportive care  4.         Limit sedation as able  5.         Trach/PEG soon     Discharge planning: LTAC    Reviewed treatment plans with the patient and/or family. 25 minutes spent in face to face interaction and coordination of care.      Electronically signed by Sandy Jones MD on 5/9/2022 at 9:26 AM

## 2022-05-09 NOTE — PROGRESS NOTES
Nephrology ([address] [facility]) Progress Note    Patient:  Ventura Morris  YOB: 1970  Date of Service: 5/9/2022  MRN: [id number]   Acct: [de-identified]   Primary Care Physician: ROMINA Dorman  Advance Directive: Full Code  Admit Date: 4/24/2022       Hospital Day: 15  Referring Provider: Ramses Robles DO    Patient Seen, Chart, Consults, Notes, Labs, Radiology studies reviewed. Subjective:  Ventura Morris is a 47 y.o. female for whom we were consulted for evaluation and treatment of end-stage renal disease.  Patient also has a history of seizure disorder, type 2 diabetes, hypertension, frequent hospitalization with noncompliance and poorly controlled diabetes.  Patient was brought to the emergency room after she was found to have unresponsiveness and witnessed grand mal seizure. Mario Zarate was brought in by ambulance.  On arrival in the emergency room, she was found to be severely hyperglycemic and had a urinary tract infection.  Patient was intubated, sedated and treated by neurology for grand mal seizure. Mario Zarate was also receiving IV antibiotics and insulin drip. [facility] blood sugar control had significantly improved.  On April 25, she has received emergent hemodialysis treatment for correction of volume status as well as hyponatremia.  She was moved from ICU to CCU. Patient remains on the ventilator and unable to participate in the history and physical examination. Events reviewed with nursing at the bedside. No family present. She is seen and examined on dialysis. She was sedated.        Dialysis   Pt was seen on RRT  Modality: Hemodialysis  Access: Arterial Venous Fistula  Location: left upper  QB: 450  QD: 700  UF: 2 liters    Allergies:  Penicillins, Lamisil advanced [terbinafine], Reglan [metoclopramide], and Stadol [butorphanol]    Medicines:  Current Facility-Administered Medications   Medication Dose Route Frequency Provider Last Rate Last Admin    vancomycin (VANCOCIN) intermittent dosing (placeholder)   Other RX Placeholder Nancye Leigh, DO        vancomycin (VANCOCIN) 750 mg in dextrose 5 % 250 mL IVPB  750 mg IntraVENous Once Nancye Leigh, DO        aminoglycoside intermittent dosing (placeholder)   Other RX Placeholder Nancye Leigh, DO        acetylcysteine (MUCOMYST) 20 % solution 600 mg  600 mg Inhalation BID Nancye Leigh, DO   600 mg at 05/09/22 0700    ipratropium-albuterol (DUONEB) nebulizer solution 1 ampule  1 ampule Inhalation Q4H Nancye Leigh, DO   1 ampule at 05/09/22 0700    insulin glargine (LANTUS) injection vial 20 Units  20 Units SubCUTAneous BID Nancye Leigh, DO   20 Units at 05/08/22 2037    hydrALAZINE (APRESOLINE) injection 10 mg  10 mg IntraVENous Q6H PRN Jeanna Pabon MD   10 mg at 05/06/22 0940    aspirin chewable tablet 81 mg  81 mg Oral Daily Jeanna Pabon MD   81 mg at 05/09/22 0824    racepinephrine HCl (VAPONEFPRIN) 2.25 % nebulizer solution NEBU 11.25 mg  11.25 mg Nebulization Q4H PRN Jeanna Pabon MD   11.25 mg at 05/03/22 1408    sodium chloride nebulizer 0.9 % solution 3 mL  3 mL Nebulization Q4H PRN Jeanna Pabon MD        propofol injection  5-50 mcg/kg/min IntraVENous Continuous Jeanna Pabon MD 14.4 mL/hr at 05/09/22 1000 40 mcg/kg/min at 05/09/22 1000    heparin (porcine) injection 5,000 Units  5,000 Units SubCUTAneous TID Jeanna Pabon MD   5,000 Units at 05/09/22 0996    Peppermint Spirit SPRT   Does not apply PRN Nancye Leigh, DO        dexmedetomidine (PRECEDEX) 400 mcg in sodium chloride 0.9 % 100 mL infusion  0.1-1.5 mcg/kg/hr IntraVENous Continuous Nancye Leigh, DO   Stopped at 05/03/22 1143    menthol-zinc oxide (CALMOSEPTINE) 0.44-20.6 % ointment   Topical BID Jeanna Pabon MD   Given at 05/09/22 0000    sodium phosphate 10.5 mmol in sodium chloride 0.9 % 250 mL IVPB  0.16 mmol/kg IntraVENous PRN Patience DO Walter        Or    sodium phosphate 21 mmol in sodium chloride 0.9 % 250 mL IVPB  0.32 mmol/kg IntraVENous PRN Patience Walter, DO        insulin lispro (HUMALOG) injection vial 0-12 Units  0-12 Units SubCUTAneous TID WC Alphonso Hikes, DO   4 Units at 05/08/22 1740    insulin lispro (HUMALOG) injection vial 0-6 Units  0-6 Units SubCUTAneous Nightly Alphonso Hikes, DO   1 Units at 05/08/22 2033    hydrALAZINE (APRESOLINE) tablet 100 mg  100 mg Oral 3 times per day Patience Walter, DO   100 mg at 05/09/22 0518    glucagon (rDNA) injection 1 mg  1 mg IntraMUSCular PRN Alphonso Hikes, DO        dextrose 5 % solution  100 mL/hr IntraVENous PRN Alphonso Hikes, DO        glucose chewable tablet 16 g  4 tablet Oral PRN Alphonso Hikes, DO        dextrose bolus (hypoglycemia) 10% 125 mL  125 mL IntraVENous PRN Alphonso Hikes, DO   Paused at 05/09/22 0840    Or    dextrose bolus (hypoglycemia) 10% 250 mL  250 mL IntraVENous PRN Alphonso Hikes, DO        sodium chloride flush 0.9 % injection 5-40 mL  5-40 mL IntraVENous 2 times per day Alphonso Hikes, DO   10 mL at 05/09/22 0825    sodium chloride flush 0.9 % injection 5-40 mL  5-40 mL IntraVENous PRN Alphonso Hikes, DO        0.9 % sodium chloride infusion   IntraVENous PRN Alphonso Hikes, DO   Stopped at 05/03/22 2005    LORazepam (ATIVAN) injection 1 mg  1 mg IntraVENous Q5 Min PRN Alphonso Hikes, DO        ondansetron (ZOFRAN-ODT) disintegrating tablet 4 mg  4 mg Oral Q8H PRN Alphonso Hikes, DO        Or    ondansetron [facility]) injection 4 mg  4 mg IntraVENous Q6H PRN Alphonso Hikes, DO        polyethylene glycol [facility]) packet 17 g  17 g Oral Daily PRN Alphonso Hikes, DO        acetaminophen (TYLENOL) tablet 650 mg  650 mg Oral Q6H PRN Alphonso Hikes, DO   650 mg at 04/25/22 5423    Or    acetaminophen (TYLENOL) suppository 650 mg  650 mg Rectal Q6H PRN Alphonso Hikes, DO        levETIRAcetam (KEPPRA) 500 mg/100 mL FISTULA CREATION Left 1/25/2019    REVISION LEFT UPPER EXTREMITY AV ACCESS WITH LEFT BRACHIAL ARTERY TO LEFT AXILLARY VEIN WITH  INTERPOSITIONAL ARTEGRAFT   performed by Juilus Pina MD at [address]  2012    [address] Right 1/25/2019    INSERTION OF RIGHT INTERNAL JUGULAR HEMODIALYSIS CATHETER performed by Julius Pina MD at [address] [facility]  08/17/2018    1.  Moderately increased stainable iron identified by special stain in Kupffer cells and occasional hepatocytes. Negative for evidence of significant portal or lobular inflammation. Negative for evidence of significant fibrosis    NM COLONOSCOPY W/BIOPSY SINGLE/MULTIPLE N/A 10/20/2017    Dr John Ellison prep-Tubular AP (-) dysplasia x 2--3 yr recall    NM EGD TRANSORAL BIOPSY SINGLE/MULTIPLE N/A 12/27/2016    Dr David [facility] EGD TRANSORAL BIOPSY SINGLE/MULTIPLE N/A 10/20/2017    Dr Victoria Stager (-)   [address] [facility] VASCULAR SURGERY Left 2016    fistula by Dr Volodymyr Ugalde at [address]  10/02/2017    [facility]. Left upper fistulograms/venograms, balloon angioplasty cephalic vein arch 90D19 conquest.    VASCULAR SURGERY  08/2018    FISTULOGRAM    VASCULAR SURGERY  10/29/2018    [facility]. Left upper fistulograms/venograms    VASCULAR SURGERY  12/19/2018    [facility]. Arch aortogram,left upper arteriograms.  VASCULAR SURGERY  03/06/2019    [facility]. Removal of tunneled dialyis catheter right internal jugular vein.  VASCULAR SURGERY  08/28/2019    [facility]. Left upper extremity fistulogram including venography to the superior vena cava. Balloon angioplasty left subclavian vein with 10mm x 40mm conquest balloon. Balloon angioplasty mid/proximal upper arm cephalic vein with 20YJ x 40mm conquest balloon. Venograms after balloon angioplasty. Stent left mid/proximal upper arm cephalic vein stenosis fluency 10mm x 60mm self-expanding covered stent. Balloon     VASCULAR SURGERY  08/28/2019    cont angioplasty stent with 10mm x 40mm conquest balloon. Completion venograms left upper extremity. Family History  Family History   Problem Relation Age of Onset    Colon Cancer Mother          at 63    Colon Polyps Mother     Lung Cancer Father          at 79    Colon Polyps Father     Stomach Cancer Sister     Breast Cancer Sister 27    Depression Daughter 25        committed suicide    Liver Cancer Neg Hx     Liver Disease Neg Hx     Esophageal Cancer Neg Hx     Rectal Cancer Neg Hx        Social History  Social History     Socioeconomic History    Marital status: Single     Spouse name: Not on file    Number of children: 2    Years of education: Not on file    Highest education level: Not on file   Occupational History    Not on file   Tobacco Use    Smoking status: Current Every Day Smoker     Packs/day: 0.50     Years: 33.00     Pack years: 16.50     Types: Cigarettes    Smokeless tobacco: Never Used    Tobacco comment: NOW at 1/4 PD, started at age 24 and has averaged 2 PPD   Vaping Use    Vaping Use: Never used   Substance and Sexual Activity    Alcohol use: No    Drug use: Not Currently     Types: Marijuana [employer])    Sexual activity: Not Currently     Partners: Male   Other Topics Concern    Not on file   Social History Narrative    Not on file     Social Determinants of Health     Financial Resource Strain:     Difficulty of Paying Living Expenses: Not on file   Food Insecurity:     Worried About Running Out of Food in the Last Year: Not on file    Kendy of Food in the Last Year: Not on file   Transportation Needs:     Lack of Transportation (Medical): Not on file    Lack of Transportation (Non-Medical):  Not on file   Physical Activity:     Days of Exercise per Week: Not on file    Minutes of Exercise per Session: Not on file   Stress:     Feeling of Stress : Not on file   Social Connections:     Frequency of Communication with Friends and Family: Not on file    Frequency of Social Gatherings with Friends and Family: Not on file    Attends Yarsani Services: Not on file    Active Member of Clubs or Organizations: Not on file    Attends Club or Organization Meetings: Not on file    Marital Status: Not on file   Intimate Partner Violence:     Fear of Current or Ex-Partner: Not on file    Emotionally Abused: Not on file    Physically Abused: Not on file    Sexually Abused: Not on file   Housing Stability:     Unable to Pay for Housing in the Last Year: Not on file    Number of [place] in the Last Year: Not on file    Unstable Housing in the Last Year: Not on file         Review of Systems:  Reviewed with the patient at the bedside, 6 points reviewed and negative except as noted above. Objective:  Blood pressure (!) 112/52, pulse 76, temperature 97.6 °F (36.4 °C), temperature source Temporal, resp. rate 14, height 5' 6\" (1.676 m), weight 130 lb 11.2 oz (59.3 kg), SpO2 100 %.     Intake/Output Summary (Last 24 hours) at 5/9/2022 1056  Last data filed at 5/9/2022 1000  Gross per 24 hour   Intake 2034.96 ml   Output    Net 2034.96 ml     General: intubated, sedated  Chest: bilateral air entry, decreased breath sounds at the bases  CVS: regular rate and rhythm  Abdominal: soft, nontender, normal bowel sounds  Extremities: no cyanosis or edema  Skin: warm and dry without rash    Labs:  BMP:   Recent Labs     05/07/22 0021 05/08/22 0123 05/09/22  0130   * 136 136   K 4.0 4.8 4.6   CL 88* 89* 90*   CO2 28 27 23   PHOS 4.9* 7.3* 8.3*   BUN 34* 58* 76*   CREATININE 2.4* 3.2* 4.0*   CALCIUM 9.0 9.3 8.5*     CBC:   Recent Labs     05/07/22 0021 05/08/22 0123 05/09/22  0130   WBC 14.0* 15.5* 11.7*   HGB 10.3* 9.8* 8.1*   HCT 34.2* 32.8* 27.5*   MCV 93.4 95.9 95.5    250 237     LIVER PROFILE:   Recent Labs     05/07/22 0021 05/08/22  0123 05/09/22  0130   AST 15 13 13   ALT 7 7 6   BILITOT <0.2 0.3 0.3   ALKPHOS 104 102 85     PT/INR:   Recent Labs     05/07/22  0021 05/08/22  0123 05/09/22  0130   PROTIME 13.3 13.8 14.1   INR 1.02 1.07 1.09     APTT: No results for input(s): APTT in the last 72 hours. BNP:  No results for input(s): BNP in the last 72 hours. Ionized Calcium:No results for input(s): IONCA in the last 72 hours. Magnesium:  Recent Labs     05/07/22 0021 05/08/22 0123 05/09/22  0130   MG 2.3 2.6 2.5     Phosphorus:  Recent Labs     05/07/22 0021 05/08/22 0123 05/09/22  0130   PHOS 4.9* 7.3* 8.3*     HgbA1C: No results for input(s): LABA1C in the last 72 hours. Hepatic:   Recent Labs     05/07/22 0021 05/08/22 0123 05/09/22  0130   ALKPHOS 104 102 85   ALT 7 7 6   AST 15 13 13   PROT 6.4* 6.3* 5.6*   BILITOT <0.2 0.3 0.3   LABALBU 3.2* 3.2* 2.7*     Lactic Acid: No results for input(s): LACTA in the last 72 hours. Troponin: No results for input(s): CKTOTAL, CKMB, TROPONINT in the last 72 hours. ABGs: No results for input(s): PH, PCO2, PO2, HCO3, O2SAT in the last 72 hours. CRP:  No results for input(s): CRP in the last 72 hours. Sed Rate:  No results for input(s): SEDRATE in the last 72 hours. Cultures:   No results for input(s): CULTURE in the last 72 hours. No results for input(s): BC, Temo Konig in the last 72 hours. No results for input(s): CXSURG in the last 72 hours. Radiology reports as per the Radiologist  Radiology: CT Head WO Contrast    Result Date: 4/24/2022  CT HEAD WO CONTRAST 4/24/2022 2:01 PM HISTORY: Altered mental status COMPARISON: CT scan dated 11/18/2021 DOSE LENGTH PRODUCT: 766 mGy cm TECHNIQUE: Helical tomographic images of the brain were obtained without the use of intravenous contrast. Automated exposure control was also utilized to decrease patient radiation dose. FINDINGS: There is no evidence of evolving large vascular territory infarct. No visualized intra-axial or extra-axial hemorrhage. No mass lesion is identified. Normal size and configuration of the ventricular system. The basal cisterns are symmetric. Posterior fossa structures are unremarkable. The included orbits and their contents are unremarkable. Chronic paranasal sinus disease on the left. The visualized osseous structures and overlying soft tissues of the skull and face are unremarkable. 1. No acute intracranial process. Signed by Dr Mirna Blackmon    Result Date: 4/24/2022  XR CHEST PORTABLE 4/24/2022 4:27 PM HISTORY: ET tube, enteric tube  Technique: Single AP view of the chest COMPARISONS: 4/24/2022 FINDINGS: Endotracheal tube is identified with tip essentially touching the krysta. Enteric tube courses into the stomach with tip curled in the fundus back towards the gastroesophageal junction. The lungs are clear and well expanded. Heart size is stable. Pulmonary vasculature are nondilated. No pleural effusion or pneumothorax. 1. Endotracheal tube is deep, tip essentially touching the krysta. Lungs are clear and well expanded. I would recommend 2-3 cm withdrawal for a more optimal positioning. The results of this exam were discussed with [facility] (ICU nursing) on 4/24/2022 at 1640 hours Signed by Dr Edis Nguyen    XR CHEST PORTABLE    Result Date: 4/24/2022  XR CHEST PORTABLE 4/24/2022 1:17 PM HISTORY: ET tube placement  Technique: Single AP view of the chest COMPARISONS: 4/24/2022 FINDINGS: Status post endotracheal intubation. ET tube is in good position. Lungs are well-expanded. Enteric tube curls on itself in the midesophagus and then extends back up into the throat. Heart size is stable. Pulmonary vasculature are nondilated. 1. ET tube is in good position. Lungs are clear and well expanded. 2. Enteric tube curls on itself in the mid esophagus before extending back up into the throat. This will need repositioned.  The results of this exam were discussed with Dr. Lawrence Farrar on 4/24/2022 at 1329 hours Signed by Dr Edis Nguyen    XR CHEST PORTABLE    Result Date: 4/24/2022  XR CHEST PORTABLE 4/24/2022 12:34 PM HISTORY: Altered mental status  Technique: Single AP view of the chest COMPARISONS: Chest exam dated 4/1/2022 FINDINGS: No lung consolidation. No pleural effusion or pneumothorax. Cardiomediastinal silhouette and pulmonary vasculature are unremarkable. No acute bony abnormality. Left subclavian region vascular stent with additional stent projecting over the left humerus. No acute cardiopulmonary process. Signed by Dr Raúl Salazar   -ESRD  -DM2 with nephropathy on long-term insulin, uncontrolled  -HTN  -Hyponatremia  -Hypokalemia  -Secondary Hyperparathyroidism  -Anemia CKD  -Seizure disorder  -Hypothyroidism   -Acuter resp failure       Plan:  Dialysis as above, follow up labs. Would hold on ESAs for severe seizure disorders.      Rupali Delatorre MD, MD  05/09/22  10:56 AM

## 2022-05-09 NOTE — PROGRESS NOTES
Rozina Fontenot MD        propofol injection  5-50 mcg/kg/min IntraVENous Continuous Aide Gee MD 10.8 mL/hr at 05/09/22 1728 30 mcg/kg/min at 05/09/22 1728    heparin (porcine) injection 5,000 Units  5,000 Units SubCUTAneous TID Aide Gee MD   5,000 Units at 05/09/22 1416    Peppermint Spirit SPRT   Does not apply PRN Cole Phlegm, DO        dexmedetomidine (PRECEDEX) 400 mcg in sodium chloride 0.9 % 100 mL infusion  0.1-1.5 mcg/kg/hr IntraVENous Continuous Cole Phlegm, DO   Stopped at 05/03/22 1143    menthol-zinc oxide (CALMOSEPTINE) 0.44-20.6 % ointment   Topical BID Aide Gee MD   Given at 05/09/22 0000    sodium phosphate 10.5 mmol in sodium chloride 0.9 % 250 mL IVPB  0.16 mmol/kg IntraVENous PRN Patience Walter, DO        Or    sodium phosphate 21 mmol in sodium chloride 0.9 % 250 mL IVPB  0.32 mmol/kg IntraVENous PRN Patience Walter, DO        insulin lispro (HUMALOG) injection vial 0-12 Units  0-12 Units SubCUTAneous TID WC Jules Phlegm, DO   4 Units at 05/08/22 1740    insulin lispro (HUMALOG) injection vial 0-6 Units  0-6 Units SubCUTAneous Nightly Jules Phlegm, DO   1 Units at 05/08/22 2033    hydrALAZINE (APRESOLINE) tablet 100 mg  100 mg Oral 3 times per day Patience Walter, DO   100 mg at 05/09/22 1416    glucagon (rDNA) injection 1 mg  1 mg IntraMUSCular PRN Jules Phlegm, DO        dextrose 5 % solution  100 mL/hr IntraVENous PRN Cole Phlegm, DO        glucose chewable tablet 16 g  4 tablet Oral PRN Cole Phlegm, DO        dextrose bolus (hypoglycemia) 10% 125 mL  125 mL IntraVENous PRN Cole Phlegm, DO   Stopped at 05/09/22 1252    Or    dextrose bolus (hypoglycemia) 10% 250 mL  250 mL IntraVENous PRN Cole Phlegm, DO        sodium chloride flush 0.9 % injection 5-40 mL  5-40 mL IntraVENous 2 times per day Cole Phlegm, DO   10 mL at 05/09/22 0825    sodium chloride flush 0.9 % injection 5-40 mL  5-40 mL IntraVENous PRN Fannie Odem, DO        0.9 % sodium chloride infusion   IntraVENous PRN Fannie Odem, DO   Stopped at 05/03/22 2005    LORazepam (ATIVAN) injection 1 mg  1 mg IntraVENous Q5 Min PRN Fannie Odem, DO        ondansetron (ZOFRAN-ODT) disintegrating tablet 4 mg  4 mg Oral Q8H PRN Fannie Odem, DO        Or    ondansetron [facility]) injection 4 mg  4 mg IntraVENous Q6H PRN Fannie Odem, DO        polyethylene glycol [facility]) packet 17 g  17 g Oral Daily PRN Fannie Odem, DO        acetaminophen (TYLENOL) tablet 650 mg  650 mg Oral Q6H PRN Fannie Odem, DO   650 mg at 04/25/22 7053    Or    acetaminophen (TYLENOL) suppository 650 mg  650 mg Rectal Q6H PRN Fannie Odem, DO        levETIRAcetam (KEPPRA) 500 mg/100 mL IVPB  500 mg IntraVENous Q12H Fannie Odem, DO   Stopped at 05/09/22 1318    chlorhexidine (PERIDEX) 0.12 % solution 15 mL  15 mL Mouth/Throat BID Fannie Odem, DO   15 mL at 05/09/22 4828    famotidine (PEPCID) 20 mg in sodium chloride (PF) 10 mL injection  20 mg IntraVENous Daily Fannie Odem, DO   20 mg at 05/09/22 7367    magic butt cream   Topical Q4H PRN Fannie Odem, DO   Given at 05/03/22 5668    magnesium sulfate 1000 mg in dextrose 5% 100 mL IVPB  1,000 mg IntraVENous PRN Patience Walter, DO        potassium bicarb-citric acid (EFFER-K) effervescent tablet 40 mEq  40 mEq Oral PRN Patience Walter, DO        Or    potassium chloride 10 mEq/100 mL IVPB (Peripheral Line)  10 mEq IntraVENous PRN Patience Walter, DO   Stopped at 04/25/22 8803       Past Medical History:  Past Medical History:   Diagnosis Date    [facility] ischemic [facility] 4/30/2022    Arthritis     Chronic kidney disease, stage 5, kidney failure (HCC)     Closed fracture of right shoulder girdle, with routine healing, subsequent encounter     DM (diabetes mellitus) type II controlled with renal manifestation ([facility] 06/1993    Gastroparesis     GERD (gastroesophageal reflux disease)     Hemodialysis patient ([address]     dialysis on tues, thur, and sat at [facility]    Hypertension     Hypothyroid     Nasal congestion     recent    Neuropathy     Noncompliance with medications     Palliative care patient 10/08/2019    Restless leg syndrome        Past Surgical History:  Past Surgical History:   Procedure Laterality Date    BREAST SURGERY Left 2008    infected milk gland removed    BREAST SURGERY Right 5/25/2021    WEDGE EXCISION RIGHT BREAST DUCT WITH LACRIMAL DUCT PROBE, PEC BLOCK performed by Magnus Yousif MD at [facility]      2008    COLONOSCOPY Left 9/17/2020    Dr eGno Khalil hepatic flexure-Severe tortuosity with severe spasming, 1 yr recall    DIALYSIS FISTULA CREATION Left 1/25/2019    REVISION LEFT UPPER EXTREMITY AV ACCESS WITH LEFT BRACHIAL ARTERY TO LEFT AXILLARY VEIN WITH  INTERPOSITIONAL ARTEGRAFT   performed by Zachary Guzmán MD at [address]  2012    [address] Right 1/25/2019    INSERTION OF RIGHT INTERNAL JUGULAR HEMODIALYSIS CATHETER performed by Zachary Guzmán MD at [address] [facility]  08/17/2018    1.  Moderately increased stainable iron identified by special stain in Kupffer cells and occasional hepatocytes. Negative for evidence of significant portal or lobular inflammation. Negative for evidence of significant fibrosis    [place] COLONOSCOPY W/BIOPSY SINGLE/MULTIPLE N/A 10/20/2017    Dr Radha Collazo prep-Tubular AP (-) dysplasia x 2--3 yr recall    [place] EGD TRANSORAL BIOPSY SINGLE/MULTIPLE N/A 12/27/2016    Dr Bia Pitts EGD TRANSORAL BIOPSY SINGLE/MULTIPLE N/A 10/20/2017    Dr Lizy Prieto (-)   [facility] VASCULAR SURGERY Left 2016    fistula by Dr Dick Bacon at [address]  10/02/2017    [facility]. Left upper fistulograms/venograms, balloon angioplasty cephalic vein arch 41Y33 conquest.  VASCULAR SURGERY  2018    FISTULOGRAM    VASCULAR SURGERY  10/29/2018    [facility]. Left upper fistulograms/venograms    VASCULAR SURGERY  2018    [facility]. Arch aortogram,left upper arteriograms.  VASCULAR SURGERY  2019    [facility]. Removal of tunneled dialyis catheter right internal jugular vein.  VASCULAR SURGERY  2019    [facility]. Left upper extremity fistulogram including venography to the superior vena cava. Balloon angioplasty left subclavian vein with 10mm x 40mm conquest balloon. Balloon angioplasty mid/proximal upper arm cephalic vein with 64IS x 40mm conquest balloon. Venograms after balloon angioplasty. Stent left mid/proximal upper arm cephalic vein stenosis fluency 10mm x 60mm self-expanding covered stent. Balloon     VASCULAR SURGERY  2019    cont angioplasty stent with 10mm x 40mm conquest balloon. Completion venograms left upper extremity.        Family History  Family History   Problem Relation Age of Onset    Colon Cancer Mother          at 63    Colon Polyps Mother     Lung Cancer Father          at 79    Colon Polyps Father     Stomach Cancer Sister     Breast Cancer Sister 27    Depression Daughter 25        committed suicide    Liver Cancer Neg Hx     Liver Disease Neg Hx     Esophageal Cancer Neg Hx     Rectal Cancer Neg Hx        Social History  Social History     Socioeconomic History    Marital status: Single     Spouse name: Not on file    Number of children: 2    Years of education: Not on file    Highest education level: Not on file   Occupational History    Not on file   Tobacco Use    Smoking status: Current Every Day Smoker     Packs/day: 0.50     Years: 33.00     Pack years: 16.50     Types: Cigarettes    Smokeless tobacco: Never Used    Tobacco comment: NOW at 1/4 PD, started at age 24 and has averaged 2 PPD   Vaping Use    Vaping Use: Never used   Substance and Sexual Activity    Alcohol use: No    Drug use: Not Currently     Types: Marijuana Mahesh Reli)    Sexual activity: Not Currently     Partners: Male   Other Topics Concern    Not on file   Social History Narrative    Not on file     Social Determinants of Health     Financial Resource Strain:     Difficulty of Paying Living Expenses: Not on file   Food Insecurity:     Worried About Running Out of Food in the Last Year: Not on file    Kendy of Food in the Last Year: Not on file   Transportation Needs:     Lack of Transportation (Medical): Not on file    Lack of Transportation (Non-Medical): Not on file   Physical Activity:     Days of Exercise per Week: Not on file    Minutes of Exercise per Session: Not on file   Stress:     Feeling of Stress : Not on file   Social Connections:     Frequency of Communication with Friends and Family: Not on file    Frequency of Social Gatherings with Friends and Family: Not on file    Attends Zoroastrianism Services: Not on file    Active Member of [address] [place] [address] [employer] or Organizations: Not on file    Attends Club or Organization Meetings: Not on file    Marital Status: Not on file   Intimate Partner Violence:     Fear of Current or Ex-Partner: Not on file    Emotionally Abused: Not on file    Physically Abused: Not on file    Sexually Abused: Not on file   Housing Stability:     Unable to Pay for Housing in the Last Year: Not on file    Number of [place] in the Last Year: Not on file    Unstable Housing in the Last Year: Not on file         Review of Systems:    Review of Systems   Unable to perform ROS: Intubated           Objective:  Blood pressure (!) 158/54, pulse 85, temperature 97.6 °F (36.4 °C), temperature source Temporal, resp. rate 14, height 5' 6\" (1.676 m), weight 130 lb 11.2 oz (59.3 kg), SpO2 98 %. Intake/Output Summary (Last 24 hours) at 5/9/2022 1734  Last data filed at 5/9/2022 1200  Gross per 24 hour   Intake 1051. 85 ml   Output    Net 1051. 85 ml       Physical Exam  Vitals and nursing note reviewed.    Constitutional:       Appearance: She is ill-appearing. Interventions: She is sedated and intubated. HENT:      Head: Normocephalic and atraumatic. Right Ear: External ear normal.      Left Ear: External ear normal.      Nose: Nose normal.      Mouth/Throat:      Mouth: Mucous membranes are moist.   Eyes:      Conjunctiva/sclera: Conjunctivae normal.      Pupils: Pupils are equal, round, and reactive to light. Cardiovascular:      Rate and Rhythm: Normal rate and regular rhythm. Heart sounds: Normal heart sounds. Pulmonary:      Effort: Pulmonary effort is normal. She is intubated. Breath sounds: Normal breath sounds. Abdominal:      General: Abdomen is flat. Palpations: Abdomen is soft. Musculoskeletal:      Cervical back: Neck supple. No rigidity. No muscular tenderness. Skin:     General: Skin is warm and dry. Labs:  BMP:   Recent Labs     05/07/22 0021 05/08/22 0123 05/09/22 0130   * 136 136   K 4.0 4.8 4.6   CL 88* 89* 90*   CO2 28 27 23   PHOS 4.9* 7.3* 8.3*   BUN 34* 58* 76*   CREATININE 2.4* 3.2* 4.0*   CALCIUM 9.0 9.3 8.5*     CBC:   Recent Labs     05/07/22  0021 05/08/22 0123 05/09/22 0130   WBC 14.0* 15.5* 11.7*   HGB 10.3* 9.8* 8.1*   HCT 34.2* 32.8* 27.5*   MCV 93.4 95.9 95.5    250 237     LIVER PROFILE:   Recent Labs     05/07/22  0021 05/08/22 0123 05/09/22  0130   AST 15 13 13   ALT 7 7 6   BILITOT <0.2 0.3 0.3   ALKPHOS 104 102 85     PT/INR:   Recent Labs     05/07/22  0021 05/08/22 0123 05/09/22  0130   PROTIME 13.3 13.8 14.1   INR 1.02 1.07 1.09     APTT: No results for input(s): APTT in the last 72 hours. BNP:  No results for input(s): BNP in the last 72 hours. Ionized Calcium:No results for input(s): IONCA in the last 72 hours.   Magnesium:  Recent Labs     05/07/22  0021 05/08/22  0123 05/09/22  0130   MG 2.3 2.6 2.5     Phosphorus:  Recent Labs     05/07/22  0021 05/08/22 0123 05/09/22  0130   PHOS 4.9* 7.3* 8.3*     HgbA1C: No results for input(s): LABA1C in the last 72 hours. Hepatic:   Recent Labs     05/07/22  0021 05/08/22  0123 05/09/22  0130   ALKPHOS 104 102 85   ALT 7 7 6   AST 15 13 13   PROT 6.4* 6.3* 5.6*   BILITOT <0.2 0.3 0.3   LABALBU 3.2* 3.2* 2.7*     Lactic Acid: No results for input(s): LACTA in the last 72 hours. Troponin: No results for input(s): CKTOTAL, CKMB, TROPONINT in the last 72 hours. ABGs: No results for input(s): PH, PCO2, PO2, HCO3, O2SAT in the last 72 hours. CRP:  No results for input(s): CRP in the last 72 hours. Sed Rate:  No results for input(s): SEDRATE in the last 72 hours. Cultures:   No results for input(s): CULTURE in the last 72 hours. No results for input(s): BC, Phuong Quintero in the last 72 hours. No results for input(s): CXSURG in the last 72 hours. Radiology reports as per the Radiologist  Radiology: CT Head WO Contrast    Result Date: 4/24/2022  CT HEAD WO CONTRAST 4/24/2022 2:01 PM HISTORY: Altered mental status COMPARISON: CT scan dated 11/18/2021 DOSE LENGTH PRODUCT: 766 mGy cm TECHNIQUE: Helical tomographic images of the brain were obtained without the use of intravenous contrast. Automated exposure control was also utilized to decrease patient radiation dose. FINDINGS: There is no evidence of evolving large vascular territory infarct. No visualized intra-axial or extra-axial hemorrhage. No mass lesion is identified. Normal size and configuration of the ventricular system. The basal cisterns are symmetric. Posterior fossa structures are unremarkable. The included orbits and their contents are unremarkable. Chronic paranasal sinus disease on the left. The visualized osseous structures and overlying soft tissues of the skull and face are unremarkable. 1. No acute intracranial process.  Signed by Dr Cyrus Biggs    Result Date: 4/24/2022  XR CHEST PORTABLE 4/24/2022 4:27 PM HISTORY: ET tube, enteric tube  Technique: Single AP view of the chest COMPARISONS: 4/24/2022 FINDINGS: Endotracheal tube is identified with tip essentially touching the krysta. Enteric tube courses into the stomach with tip curled in the fundus back towards the gastroesophageal junction. The lungs are clear and well expanded. Heart size is stable. Pulmonary vasculature are nondilated. No pleural effusion or pneumothorax. 1. Endotracheal tube is deep, tip essentially touching the krysta. Lungs are clear and well expanded. I would recommend 2-3 cm withdrawal for a more optimal positioning. The results of this exam were discussed with Larry Charlton (ICU nursing) on 4/24/2022 at 1640 hours Signed by Dr Brooklyn Velasquez    XR CHEST PORTABLE    Result Date: 4/24/2022  XR CHEST PORTABLE 4/24/2022 1:17 PM HISTORY: ET tube placement  Technique: Single AP view of the chest COMPARISONS: 4/24/2022 FINDINGS: Status post endotracheal intubation. ET tube is in good position. Lungs are well-expanded. Enteric tube curls on itself in the midesophagus and then extends back up into the throat. Heart size is stable. Pulmonary vasculature are nondilated. 1. ET tube is in good position. Lungs are clear and well expanded. 2. Enteric tube curls on itself in the mid esophagus before extending back up into the throat. This will need repositioned. The results of this exam were discussed with  [facility] on 4/24/2022 at 1329 hours Signed by Dr Brooklyn Velasquez    XR CHEST PORTABLE    Result Date: 4/24/2022  XR CHEST PORTABLE 4/24/2022 12:34 PM HISTORY: Altered mental status  Technique: Single AP view of the chest COMPARISONS: Chest exam dated 4/1/2022 FINDINGS: No lung consolidation. No pleural effusion or pneumothorax. Cardiomediastinal silhouette and pulmonary vasculature are unremarkable. No acute bony abnormality. Left subclavian region vascular stent with additional stent projecting over the left humerus. No acute cardiopulmonary process. Signed by Dr Iglesia Velasco. Continue Keppra.   Neurology following.     Acute left ischemic MCA stroke. Continue supportive care.     Acute hypoxemic respiratory failure. Continue ventilatory support. Trach and PEG  tomorrow.     Nonketotic hyperosmolar hyperglycemia. Resolved  Transitioned to subcutaneous insulin     End-stage renal disease. Maintenance dialysis.     Please document 40 minutes of critical care time for patient assessment, chart review, discussion with staff, .       Jose Yeung DO

## 2022-05-09 NOTE — PROGRESS NOTES
Palliative Care Progress Note  5/9/2022 8:26 AM    Patient:  Mahad Salcido  YOB: 1970  Primary Care Physician: ROMINA Jordan  Advance Directive: Full Code  Admit Date: 4/24/2022       Hospital Day: 15  Portions of this note have been copied forward, however, changed to reflect the most current clinical status of this patient. CHIEF COMPLAINT/REASON FOR CONSULTATION Goals of care    SUBJECTIVE:  Ms. Mihaela Oconnor remains intubated/sedated. Currently receiving HD treatment    Review of Systems:   14 point review of systems is negative except as specifically addressed above. Objective:   VITALS:  BP (!) 135/58   Pulse 87   Temp 97.6 °F (36.4 °C) (Temporal)   Resp 17   Ht 5' 6\" (1.676 m)   Wt 130 lb 11.2 oz (59.3 kg)   SpO2 98%   BMI 21.10 kg/m²   24HR INTAKE/OUTPUT:      Intake/Output Summary (Last 24 hours) at 5/9/2022 0826  Last data filed at 5/9/2022 0600  Gross per 24 hour   Intake 1916.62 ml   Output    Net 1916.62 ml     General appearance: 48 yo female, chronically ill appearing, intubated/sedated, no acute distress  Head: Normocephalic, without obvious abnormality, atraumatic  Eyes: conjunctivae/corneas clear. Pupils sluggish  Ears: normal external ears and nose  Neck: supple, symmetrical, trachea midline   Lungs: diminished bilaterally to asucultation, mechanically ventilated, equal chest rise  Heart: regular rate and rhythm, S1, S2 normal, no murmur  Abdomen: soft, no grimacing w/ palpation; non-distended, bowel sounds present .  FMS in place with small diarrhea noted   Extremities: trace edema in BUE, No lower extremity edema,  No erythema, no grimacing w/ palpation   Skin: warm, dry  Neurologic: Intubated/sedated, does not respond to voice, withdrawals to pain    Medications:      propofol 30 mcg/kg/min (05/09/22 0600)    dexmedetomidine HCl in NaCl Stopped (05/03/22 1143)    dextrose      sodium chloride Stopped (05/03/22 2005)      vancomycin (VANCOCIN) intermittent dosing (placeholder)   Other RX Placeholder    vancomycin  750 mg IntraVENous Once    aminoglycoside intermittent dosing (placeholder)   Other RX Placeholder    acetylcysteine  600 mg Inhalation BID    ipratropium-albuterol  1 ampule Inhalation Q4H    insulin glargine  20 Units SubCUTAneous BID    aspirin  81 mg Oral Daily    heparin (porcine)  5,000 Units SubCUTAneous TID    menthol-zinc oxide   Topical BID    insulin lispro  0-12 Units SubCUTAneous TID WC    insulin lispro  0-6 Units SubCUTAneous Nightly    hydrALAZINE  100 mg Oral 3 times per day    sodium chloride flush  5-40 mL IntraVENous 2 times per day    levetiracetam  500 mg IntraVENous Q12H    chlorhexidine  15 mL Mouth/Throat BID    famotidine (PEPCID) injection  20 mg IntraVENous Daily     hydrALAZINE, racepinephrine HCl, sodium chloride nebulizer, Peppermint Spirit, sodium phosphate IVPB **OR** sodium phosphate IVPB, glucagon (rDNA), dextrose, glucose, dextrose bolus **OR** dextrose bolus, sodium chloride flush, sodium chloride, LORazepam, ondansetron **OR** ondansetron, polyethylene glycol, acetaminophen **OR** acetaminophen, magic butt cream, magnesium sulfate, potassium alternative oral replacement **OR** potassium chloride  Diet NPO  ADULT TUBE FEEDING; Orogastric; Peptide Based High Protein; Continuous; 41; No; 0; Other (specify); no water flush     Lab and other Data:     Recent Labs     05/07/22  0021 05/08/22  0123 05/09/22  0130   WBC 14.0* 15.5* 11.7*   HGB 10.3* 9.8* 8.1*    250 237     Recent Labs     05/07/22  0021 05/08/22  0123 05/09/22  0130   * 136 136   K 4.0 4.8 4.6   CL 88* 89* 90*   CO2 28 27 23   BUN 34* 58* 76*   CREATININE 2.4* 3.2* 4.0*   GLUCOSE 170* 91 74     Recent Labs     05/07/22  0021 05/08/22  0123 05/09/22  0130   AST 15 13 13   ALT 7 7 6   BILITOT <0.2 0.3 0.3   ALKPHOS 104 102 85       Assessment/Plan   Principal Problem:    Acute ischemic stroke (HCC)  Active Problems:    Weakness Severe malnutrition (HCC)    Gastroesophageal reflux disease without esophagitis    Acquired hypothyroidism    Anemia in chronic kidney disease (CKD)    Type 2 diabetes mellitus with chronic kidney disease on chronic dialysis, with long-term current use of insulin (HCC)    Acute encephalopathy    Respiratory failure ([address]    Uncontrolled type 2 diabetes mellitus with complication, with long-term current use of insulin (HCC)    ESRD (end stage renal disease) on dialysis ([address]    PVD (peripheral vascular disease) ([address]    ESRD on hemodialysis ([address]    Hyponatremia    Hyperglycemia due to type 2 diabetes mellitus ([address]    Uncontrolled hypertension    Seizure ([address]    Palliative care patient    Uncontrolled type 2 diabetes mellitus with hyperosmolar nonketotic hyperglycemia (HCC)    Altered mental state    Acute hypoxemic respiratory failure (HCC)    Generalized weakness    UTI (urinary tract infection)    Closed fracture of left distal femur (HCC)    History of infection with vancomycin resistant Enterococcus (VRE)  Resolved Problems:    * No resolved hospital problems. *      Visit Summary:  Chart reviewed, patient discussed with nursing staff. Reviewed health issues, work up and treatment plan as well as factors that lead to hospitalization. Report obtained from RN with no acute events over the weekend. She remains clinically unchanged with plans for trach/PEG placement this week. Antibiotics adjusted per hospitalist following growth of multiple organisms from sputum culture. She remains on minimal vent settings. I attempted to call her significant other/legal decision maker, Zee Baldwin, with no answer. I left a VM explaining tentative although our plans for tomorrow following dialysis today and my callback number. Will continue to follow    Recommendations:   1. Palliative care: GOC continue aggressive measures with trach/PEG placement tomorrow. Sig other previously agreeable to [facility] referral following procedure.  Code status: FULL CODE    2. Acute hypoxemic respiratory failure- Extubated 5/3/22 but required reintubation that afternoon. Remains on minimal vent settings FiO2 35% and PEEP 5. Original intubation date 4/24/22    3. Acute left MCA infarct- mgmt per neuro. MRI 4/29/22 noted. Echo negative 4/30/22. On ASA. Supportive care    4. Hyperglycemia w/ Hx of DM II-mgmt per Hospitalist, SSI    5. S/p tonic-clonic seizure-mgmt per Neurology, cEEG discontinued, Keppra 500 mg BID    6. ESRD on HD-Nephrology managing. Dialysis today    7. Hypertension-Hydralazine 100 mg TID, cardene gtt as needed    8. Clostridium difficile/Norovirus-Oral vancomycin completed 5/8/22    9. Pneumonia- follow cultures. IV gentamicin per hospitalist    Thank you for consulting Palliative Care and allowing us to participate in the care of this patient.    Time Spent Counseling > 50%:  YES                                   Total Time Spent with patient/family counseling, workup/treatment review, counseling and placement of orders/preparation of this note: 23 minutes    Electronically signed by ROMINA Chung CNP on 5/9/2022 at 8:26 AM    (Please note that portions of this note were completed with a voice recognition program.  Luz Elena Altman made to edit the dictations but occasionally words are mis-transcribed.)

## 2022-05-09 NOTE — PROGRESS NOTES
Pharmacy Aminoglycoside Consult    Aminoglycoside: Gentamicin  Day: 2  Current Dosing: Pulse dosing with HD    Temp max: 99    Estimated Creatinine Clearance: 15 mL/min (A) (based on SCr of 4 mg/dL (H)). Recent Labs     05/08/22  0123 05/09/22  0130   BUN 58* 76*       Recent Labs     05/08/22  0123 05/09/22  0130   CREATININE 3.2* 4.0*       Recent Labs     05/08/22  0123 05/09/22  0130   WBC 15.5* 11.7*     Culture Date Source Results   5/6/22 respiratory MRSA  Raoultella ornithinolytica (klebsiella)  Proteus vulgaris     Pre-HD level on 5/9 @0742:   3    ASSESSMENT/PLAN: After dialysis today, redose with gentamicin 175mg (3mg/kg) IV X1.  Gentamicin pre-HD level to be drawn Wednesday 5/11 early am.    Electronically signed by Agnes Perry [facility] on 5/9/2022 at 11:03 AM

## 2022-05-09 NOTE — CONSULTS
Ms. General Hopper is a 47year old female with a history of medical non-compliance, uncontrolled DM, ESRD, who was brought in with an extremely elevated blood glucose and seizure activity with decreased LOC requiring intubation. The patient has since failed intubation. During admission she has been diagnosed and treated for c diff and respiratory infections as well. She is now in need of trach and PEG in order to hopefully be transferred to [facility].     Past Medical History:   Diagnosis Date    Acute ischemic stroke ([facility] [address] 4/30/2022    Arthritis     Chronic kidney disease, stage 5, kidney failure (HCC)     Closed fracture of right shoulder girdle, with routine healing, subsequent encounter     DM (diabetes mellitus) type II controlled with renal manifestation ([facility] [address] 06/1993    Gastroparesis     GERD (gastroesophageal reflux disease)     Hemodialysis patient ([facility] [address]     dialysis on tues, thur, and sat at [facility]    Hypertension     Hypothyroid     Nasal congestion     recent    Neuropathy     Noncompliance with medications     Palliative care patient 10/08/2019    Restless leg syndrome      Past Surgical History:   Procedure Laterality Date    BREAST SURGERY Left 2008    infected milk gland removed    BREAST SURGERY Right 5/25/2021    WEDGE EXCISION RIGHT BREAST DUCT WITH LACRIMAL DUCT PROBE, PEC BLOCK performed by Kalie He MD at [address] [facility], LAPAROSCOPIC      2008    COLONOSCOPY Left 9/17/2020    Dr Ernst Lovett hepatic flexure-Severe tortuosity with severe spasming, 1 yr recall    DIALYSIS FISTULA CREATION Left 1/25/2019    REVISION LEFT UPPER EXTREMITY AV ACCESS WITH LEFT BRACHIAL ARTERY TO LEFT AXILLARY VEIN WITH  INTERPOSITIONAL ARTEGRAFT   performed by Lizzeth Portillo MD at [address]  2012    [address] Right 1/25/2019    INSERTION OF RIGHT INTERNAL JUGULAR HEMODIALYSIS CATHETER performed by Lizzeth Portillo MD at [address] [facility] 08/17/2018    1.  Moderately increased stainable iron identified by special stain in Kupffer cells and occasional hepatocytes. Negative for evidence of significant portal or lobular inflammation. Negative for evidence of significant fibrosis    IL COLONOSCOPY W/BIOPSY SINGLE/MULTIPLE N/A 10/20/2017    Dr Payal Lemos prep-Tubular AP (-) dysplasia x 2--3 yr recall    IL EGD TRANSORAL BIOPSY SINGLE/MULTIPLE N/A 12/27/2016    Dr Carlson Quiet EGD TRANSORAL BIOPSY SINGLE/MULTIPLE N/A 10/20/2017    Dr Gamaliel White (-)   [address] [facility] V[facility]ULAR SURGERY Left 2016    fistula by Dr Maddison Rolon at [address]  10/02/2017    [facility]. Left upper fistulograms/venograms, balloon angioplasty cephalic vein arch 05N47 conquest.    VASCULAR SURGERY  08/2018    FISTULOGRAM    VASCULAR SURGERY  10/29/2018    [facility]. Left upper fistulograms/venograms    VASCULAR SURGERY  12/19/2018    [facility]. Arch aortogram,left upper arteriograms.  VASCULAR SURGERY  03/06/2019    [facility]. Removal of tunneled dialyis catheter right internal jugular vein.  VASCULAR SURGERY  08/28/2019    [facility]. Left upper extremity fistulogram including venography to the superior vena cava. Balloon angioplasty left subclavian vein with 10mm x 40mm conquest balloon. Balloon angioplasty mid/proximal upper arm cephalic vein with 37JM x 40mm conquest balloon. Venograms after balloon angioplasty. Stent left mid/proximal upper arm cephalic vein stenosis fluency 10mm x 60mm self-expanding covered stent. Balloon     VASCULAR SURGERY  08/28/2019    cont angioplasty stent with 10mm x 40mm conquest balloon. Completion venograms left upper extremity.      Current Facility-Administered Medications   Medication Dose Route Frequency Provider Last Rate Last Admin    gentamicin (GARAMYCIN) 175 mg in dextrose 5 % 100 mL IVPB  175 mg IntraVENous Once DO Junaid Rivas [facility]) intermittent dosing (placeholder)   Other RX Placeholder [facility] Tariq Mehta,         vancomycin (VANCOCIN) 750 mg in dextrose 5 % 250 mL IVPB  750 mg IntraVENous Once Jillianhoracio Maldonadoa, DO        aminoglycoside intermittent dosing (placeholder)   Other RX Placeholder Jillianhoracio Berkowitz, DO        acetylcysteine (MUCOMYST) 20 % solution 600 mg  600 mg Inhalation BID Jillian Ericaa, DO   600 mg at 05/09/22 0700    ipratropium-albuterol (DUONEB) nebulizer solution 1 ampule  1 ampule Inhalation Q4H Jillian Maldonadoa, DO   1 ampule at 05/09/22 1119    insulin glargine (LANTUS) injection vial 20 Units  20 Units SubCUTAneous BID Jillian Maldonadoa, DO   20 Units at 05/08/22 2037    hydrALAZINE (APRESOLINE) injection 10 mg  10 mg IntraVENous Q6H PRN Azeb Martin MD   10 mg at 05/06/22 0940    aspirin chewable tablet 81 mg  81 mg Oral Daily Azeb Martin MD   81 mg at 05/09/22 0824    racepinephrine HCl (VAPONEFPRIN) 2.25 % nebulizer solution NEBU 11.25 mg  11.25 mg Nebulization Q4H PRN Azeb Martin MD   11.25 mg at 05/03/22 1408    sodium chloride nebulizer 0.9 % solution 3 mL  3 mL Nebulization Q4H PRN Azeb Martin MD        propofol injection  5-50 mcg/kg/min IntraVENous Continuous Azeb Martin MD 10.8 mL/hr at 05/09/22 1200 30 mcg/kg/min at 05/09/22 1200    heparin (porcine) injection 5,000 Units  5,000 Units SubCUTAneous TID Azeb Martin MD   5,000 Units at 05/09/22 1837    Peppermint Spirit SPRT   Does not apply PRN Jillian Berkowitz, DO        dexmedetomidine (PRECEDEX) 400 mcg in sodium chloride 0.9 % 100 mL infusion  0.1-1.5 mcg/kg/hr IntraVENous Continuous Jillian Maldonadoa, DO   Stopped at 05/03/22 1143    menthol-zinc oxide (CALMOSEPTINE) 0.44-20.6 % ointment   Topical BID Azeb Martin MD   Given at 05/09/22 0000    sodium phosphate 10.5 mmol in sodium chloride 0.9 % 250 mL IVPB  0.16 mmol/kg IntraVENous PRN Patience Walter, DO        Or    sodium phosphate 21 mmol in sodium chloride 0.9 % 250 mL IVPB  0.32 mmol/kg IntraVENous PRN Patience Dionne Purvis, DO        insulin lispro (HUMALOG) injection vial 0-12 Units  0-12 Units SubCUTAneous TID WC Xuan Chaunceyes, DO   4 Units at 05/08/22 1740    insulin lispro (HUMALOG) injection vial 0-6 Units  0-6 Units SubCUTAneous Nightly Xuan Chaunceyes, DO   1 Units at 05/08/22 2033    hydrALAZINE (APRESOLINE) tablet 100 mg  100 mg Oral 3 times per day Patience Walter, DO   100 mg at 05/09/22 0518    glucagon (rDNA) injection 1 mg  1 mg IntraMUSCular PRN Xuan Doles, DO        dextrose 5 % solution  100 mL/hr IntraVENous PRN Xuan Doles, DO        glucose chewable tablet 16 g  4 tablet Oral PRN Xuan Doles, DO        dextrose bolus (hypoglycemia) 10% 125 mL  125 mL IntraVENous PRN Xuan Doles, DO   Paused at 05/09/22 0840    Or    dextrose bolus (hypoglycemia) 10% 250 mL  250 mL IntraVENous PRN Xuan Doles, DO        sodium chloride flush 0.9 % injection 5-40 mL  5-40 mL IntraVENous 2 times per day Xuan Doles, DO   10 mL at 05/09/22 0825    sodium chloride flush 0.9 % injection 5-40 mL  5-40 mL IntraVENous PRN Xuan Doles, DO        0.9 % sodium chloride infusion   IntraVENous PRN Xuan Doles, DO   Stopped at 05/03/22 2005    LORazepam (ATIVAN) injection 1 mg  1 mg IntraVENous Q5 Min PRN Xuan Doles, DO        ondansetron (ZOFRAN-ODT) disintegrating tablet 4 mg  4 mg Oral Q8H PRN Xuan Doles, DO        Or    [facility]etron [facility]) injection 4 mg  4 mg IntraVENous Q6H PRN Xuan Doles, DO        polyethylene glycol [facility]) packet 17 g  17 g Oral Daily PRN Xuan Doles, DO        acetaminophen (TYLENOL) tablet 650 mg  650 mg Oral Q6H PRN Xuan Doles, DO   650 mg at 04/25/22 0878    Or    acetaminophen (TYLENOL) suppository 650 mg  650 mg Rectal Q6H PRN Xuan Doles, DO        levETIRAcetam (KEPPRA) 500 mg/100 mL IVPB  500 mg IntraVENous Q12H Xuan Doles, DO   Stopped at 22 0132    chlorhexidine (PERIDEX) 0.12 % solution 15 mL  15 mL Mouth/Throat BID Lorice Najana, DO   15 mL at 22 0780    famotidine (PEPCID) 20 mg in sodium chloride (PF) 10 mL injection  20 mg IntraVENous Daily Lorice Anjana, DO   20 mg at 22 3241    magic butt cream   Topical Q4H PRN Lorice Anjana, DO   Given at 22 0940    magnesium sulfate 1000 mg in dextrose 5% 100 mL IVPB  1,000 mg IntraVENous PRN Patience Walter, DO        potassium bicarb-citric acid (EFFER-K) effervescent tablet 40 mEq  40 mEq Oral PRN Patience Walter, DO        Or    potassium chloride 10 mEq/100 mL IVPB (Peripheral Line)  10 mEq IntraVENous PRN Patience Walter, DO   Stopped at 22 0950     Allergies: Penicillins, Lamisil advanced [terbinafine], Reglan [metoclopramide], and Stadol [butorphanol]    Family History   Problem Relation Age of Onset    Colon Cancer Mother          at 61    Colon Polyps Mother     Lung Cancer Father          at 66    Colon Polyps Father     Stomach Cancer Sister     Breast Cancer Sister 27    Depression Daughter 25        committed suicide    Liver Cancer Neg Hx     Liver Disease Neg Hx     Esophageal Cancer Neg Hx     Rectal Cancer Neg Hx        Social History     Tobacco Use    Smoking status: Current Every Day Smoker     Packs/day: 0.50     Years: 33.00     Pack years: 16.50     Types: Cigarettes    Smokeless tobacco: Never Used    Tobacco comment: NOW at 1/4 PD, started at age 24 and has averaged 2 PPD   Substance Use Topics    Alcohol use: No       Review of Systems   Unable to perform ROS: Intubated       Physical Exam  Vitals reviewed. Constitutional:       Comments: Intubated, sedated   HENT:      Head: Normocephalic. Eyes:      Pupils: Pupils are equal, round, and reactive to light. Cardiovascular:      Rate and Rhythm: Normal rate and regular rhythm.    Pulmonary:      Comments: Comfortable on the vent    Abdominal: General: Abdomen is flat. Palpations: Abdomen is soft. Musculoskeletal:         General: No deformity. Cervical back: Neck supple. No rigidity. Skin:     General: Skin is warm and dry. Neurological:      General: No focal deficit present. Mental Status: Mental status is at baseline. Psychiatric:         Mood and Affect: Mood normal.         Behavior: Behavior normal.           CBC:   Lab Results   Component Value Date    WBC 11.7 05/09/2022    RBC 2.88 05/09/2022    HGB 8.1 05/09/2022    HCT 27.5 05/09/2022    MCV 95.5 05/09/2022    MCH 28.1 05/09/2022    MCHC 29.5 05/09/2022    RDW 19.0 05/09/2022     05/09/2022    MPV 9.6 05/09/2022     BMP:    Lab Results   Component Value Date     05/09/2022    K 4.6 05/09/2022    K 3.6 04/24/2022    CL 90 05/09/2022    CO2 23 05/09/2022    BUN 76 05/09/2022    LABALBU 2.7 05/09/2022    CREATININE 4.0 05/09/2022    CALCIUM 8.5 05/09/2022    GFRAA 14 05/09/2022    LABGLOM 12 05/09/2022    GLUCOSE 74 05/09/2022       Assessment and plan:  47year old female with respiratory failure  Will plan to proceed with trach and PEG in OR tomorrow, indication is that patient's significant other is wanting to proceed with full care. Other cares per the medicine teams.     Bo Feliz MD  5/9/2022  12:25 PM

## 2022-05-10 ENCOUNTER — ANESTHESIA EVENT (OUTPATIENT)
Dept: OPERATING ROOM | Age: 52
DRG: 004 | End: 2022-05-10
Payer: MEDICARE

## 2022-05-10 ENCOUNTER — ANESTHESIA (OUTPATIENT)
Dept: OPERATING ROOM | Age: 52
DRG: 004 | End: 2022-05-10
Payer: MEDICARE

## 2022-05-10 VITALS — TEMPERATURE: 97 F | DIASTOLIC BLOOD PRESSURE: 42 MMHG | OXYGEN SATURATION: 99 % | SYSTOLIC BLOOD PRESSURE: 80 MMHG

## 2022-05-10 LAB
ALBUMIN SERPL-MCNC: 3 G/DL (ref 3.5–5.2)
ALP BLD-CCNC: 90 U/L (ref 35–104)
ALT SERPL-CCNC: 7 U/L (ref 5–33)
ANION GAP SERPL CALCULATED.3IONS-SCNC: 21 MMOL/L (ref 7–19)
AST SERPL-CCNC: 14 U/L (ref 5–32)
BASOPHILS ABSOLUTE: 0.1 K/UL (ref 0–0.2)
BASOPHILS RELATIVE PERCENT: 0.7 % (ref 0–1)
BILIRUB SERPL-MCNC: 0.3 MG/DL (ref 0.2–1.2)
BUN BLDV-MCNC: 45 MG/DL (ref 6–20)
CALCIUM SERPL-MCNC: 9 MG/DL (ref 8.6–10)
CHLORIDE BLD-SCNC: 90 MMOL/L (ref 98–111)
CO2: 24 MMOL/L (ref 22–29)
CREAT SERPL-MCNC: 2.8 MG/DL (ref 0.5–0.9)
EOSINOPHILS ABSOLUTE: 0.1 K/UL (ref 0–0.6)
EOSINOPHILS RELATIVE PERCENT: 1.2 % (ref 0–5)
GFR AFRICAN AMERICAN: 21
GFR NON-AFRICAN AMERICAN: 18
GLUCOSE BLD-MCNC: 133 MG/DL (ref 70–99)
GLUCOSE BLD-MCNC: 163 MG/DL (ref 70–99)
GLUCOSE BLD-MCNC: 197 MG/DL (ref 74–109)
GLUCOSE BLD-MCNC: 43 MG/DL (ref 70–99)
GLUCOSE BLD-MCNC: 50 MG/DL (ref 70–99)
GLUCOSE BLD-MCNC: 89 MG/DL (ref 70–99)
GLUCOSE BLD-MCNC: 89 MG/DL (ref 70–99)
HCT VFR BLD CALC: 28.5 % (ref 37–47)
HEMOGLOBIN: 8.4 G/DL (ref 12–16)
IMMATURE GRANULOCYTES #: 0 K/UL
INR BLD: 1 (ref 0.88–1.18)
LYMPHOCYTES ABSOLUTE: 1.1 K/UL (ref 1.1–4.5)
LYMPHOCYTES RELATIVE PERCENT: 15.5 % (ref 20–40)
MAGNESIUM: 2.4 MG/DL (ref 1.6–2.6)
MCH RBC QN AUTO: 28.3 PG (ref 27–31)
MCHC RBC AUTO-ENTMCNC: 29.5 G/DL (ref 33–37)
MCV RBC AUTO: 96 FL (ref 81–99)
MONOCYTES ABSOLUTE: 0.6 K/UL (ref 0–0.9)
MONOCYTES RELATIVE PERCENT: 8 % (ref 0–10)
NEUTROPHILS ABSOLUTE: 5.4 K/UL (ref 1.5–7.5)
NEUTROPHILS RELATIVE PERCENT: 74.3 % (ref 50–65)
PDW BLD-RTO: 19.1 % (ref 11.5–14.5)
PERFORMED ON: ABNORMAL
PERFORMED ON: NORMAL
PERFORMED ON: NORMAL
PHOSPHORUS: 6.1 MG/DL (ref 2.5–4.5)
PLATELET # BLD: 262 K/UL (ref 130–400)
PMV BLD AUTO: 9.4 FL (ref 9.4–12.3)
POTASSIUM SERPL-SCNC: 4.4 MMOL/L (ref 3.5–5)
PROTHROMBIN TIME: 13.1 SEC (ref 12–14.6)
RBC # BLD: 2.97 M/UL (ref 4.2–5.4)
SODIUM BLD-SCNC: 135 MMOL/L (ref 136–145)
TOTAL PROTEIN: 6 G/DL (ref 6.6–8.7)
WBC # BLD: 7.2 K/UL (ref 4.8–10.8)

## 2022-05-10 PROCEDURE — 6360000002 HC RX W HCPCS: Performed by: SURGERY

## 2022-05-10 PROCEDURE — 2500000003 HC RX 250 WO HCPCS

## 2022-05-10 PROCEDURE — 84100 ASSAY OF PHOSPHORUS: CPT

## 2022-05-10 PROCEDURE — 3600000004 HC SURGERY LEVEL 4 BASE: Performed by: SURGERY

## 2022-05-10 PROCEDURE — 83735 ASSAY OF MAGNESIUM: CPT

## 2022-05-10 PROCEDURE — 6370000000 HC RX 637 (ALT 250 FOR IP): Performed by: SURGERY

## 2022-05-10 PROCEDURE — 2720000010 HC SURG SUPPLY STERILE: Performed by: SURGERY

## 2022-05-10 PROCEDURE — 6360000002 HC RX W HCPCS: Performed by: INTERNAL MEDICINE

## 2022-05-10 PROCEDURE — 3700000000 HC ANESTHESIA ATTENDED CARE: Performed by: SURGERY

## 2022-05-10 PROCEDURE — 5A1955Z RESPIRATORY VENTILATION, GREATER THAN 96 CONSECUTIVE HOURS: ICD-10-PCS | Performed by: SURGERY

## 2022-05-10 PROCEDURE — 2709999900 HC NON-CHARGEABLE SUPPLY: Performed by: SURGERY

## 2022-05-10 PROCEDURE — 85610 PROTHROMBIN TIME: CPT

## 2022-05-10 PROCEDURE — 94640 AIRWAY INHALATION TREATMENT: CPT

## 2022-05-10 PROCEDURE — 6360000002 HC RX W HCPCS

## 2022-05-10 PROCEDURE — 2580000003 HC RX 258

## 2022-05-10 PROCEDURE — 2580000003 HC RX 258: Performed by: SURGERY

## 2022-05-10 PROCEDURE — 3700000001 HC ADD 15 MINUTES (ANESTHESIA): Performed by: SURGERY

## 2022-05-10 PROCEDURE — 94003 VENT MGMT INPAT SUBQ DAY: CPT

## 2022-05-10 PROCEDURE — 31600 PLANNED TRACHEOSTOMY: CPT | Performed by: SURGERY

## 2022-05-10 PROCEDURE — 0B113F4 BYPASS TRACHEA TO CUTANEOUS WITH TRACHEOSTOMY DEVICE, PERCUTANEOUS APPROACH: ICD-10-PCS | Performed by: SURGERY

## 2022-05-10 PROCEDURE — 2700000000 HC OXYGEN THERAPY PER DAY

## 2022-05-10 PROCEDURE — 6370000000 HC RX 637 (ALT 250 FOR IP): Performed by: INTERNAL MEDICINE

## 2022-05-10 PROCEDURE — 3600000014 HC SURGERY LEVEL 4 ADDTL 15MIN: Performed by: SURGERY

## 2022-05-10 PROCEDURE — 80053 COMPREHEN METABOLIC PANEL: CPT

## 2022-05-10 PROCEDURE — 85025 COMPLETE CBC W/AUTO DIFF WBC: CPT

## 2022-05-10 PROCEDURE — 36415 COLL VENOUS BLD VENIPUNCTURE: CPT

## 2022-05-10 PROCEDURE — 2500000003 HC RX 250 WO HCPCS: Performed by: INTERNAL MEDICINE

## 2022-05-10 PROCEDURE — 2580000003 HC RX 258: Performed by: ANESTHESIOLOGY

## 2022-05-10 PROCEDURE — 99231 SBSQ HOSP IP/OBS SF/LOW 25: CPT

## 2022-05-10 PROCEDURE — C1769 GUIDE WIRE: HCPCS | Performed by: SURGERY

## 2022-05-10 PROCEDURE — 82947 ASSAY GLUCOSE BLOOD QUANT: CPT

## 2022-05-10 PROCEDURE — 2580000003 HC RX 258: Performed by: INTERNAL MEDICINE

## 2022-05-10 PROCEDURE — 43246 EGD PLACE GASTROSTOMY TUBE: CPT | Performed by: SURGERY

## 2022-05-10 PROCEDURE — 2100000000 HC CCU R&B

## 2022-05-10 PROCEDURE — 99232 SBSQ HOSP IP/OBS MODERATE 35: CPT | Performed by: PSYCHIATRY & NEUROLOGY

## 2022-05-10 RX ORDER — ONDANSETRON 2 MG/ML
INJECTION INTRAMUSCULAR; INTRAVENOUS PRN
Status: DISCONTINUED | OUTPATIENT
Start: 2022-05-10 | End: 2022-05-10 | Stop reason: SDUPTHER

## 2022-05-10 RX ORDER — SODIUM CHLORIDE 9 MG/ML
INJECTION, SOLUTION INTRAVENOUS PRN
Status: DISCONTINUED | OUTPATIENT
Start: 2022-05-10 | End: 2022-05-13 | Stop reason: SDUPTHER

## 2022-05-10 RX ORDER — FENTANYL CITRATE 50 UG/ML
INJECTION, SOLUTION INTRAMUSCULAR; INTRAVENOUS PRN
Status: DISCONTINUED | OUTPATIENT
Start: 2022-05-10 | End: 2022-05-10 | Stop reason: SDUPTHER

## 2022-05-10 RX ORDER — PROPOFOL 10 MG/ML
INJECTION, EMULSION INTRAVENOUS PRN
Status: DISCONTINUED | OUTPATIENT
Start: 2022-05-10 | End: 2022-05-10 | Stop reason: SDUPTHER

## 2022-05-10 RX ORDER — METOPROLOL TARTRATE 5 MG/5ML
INJECTION INTRAVENOUS PRN
Status: DISCONTINUED | OUTPATIENT
Start: 2022-05-10 | End: 2022-05-10 | Stop reason: SDUPTHER

## 2022-05-10 RX ORDER — HYDROMORPHONE HYDROCHLORIDE 1 MG/ML
0.5 INJECTION, SOLUTION INTRAMUSCULAR; INTRAVENOUS; SUBCUTANEOUS EVERY 5 MIN PRN
Status: DISCONTINUED | OUTPATIENT
Start: 2022-05-10 | End: 2022-05-21

## 2022-05-10 RX ORDER — HYDROMORPHONE HYDROCHLORIDE 1 MG/ML
0.25 INJECTION, SOLUTION INTRAMUSCULAR; INTRAVENOUS; SUBCUTANEOUS EVERY 5 MIN PRN
Status: DISCONTINUED | OUTPATIENT
Start: 2022-05-10 | End: 2022-05-21

## 2022-05-10 RX ORDER — MIDAZOLAM HYDROCHLORIDE 1 MG/ML
INJECTION INTRAMUSCULAR; INTRAVENOUS PRN
Status: DISCONTINUED | OUTPATIENT
Start: 2022-05-10 | End: 2022-05-10 | Stop reason: SDUPTHER

## 2022-05-10 RX ORDER — SODIUM CHLORIDE 0.9 % (FLUSH) 0.9 %
5-40 SYRINGE (ML) INJECTION EVERY 12 HOURS SCHEDULED
Status: DISCONTINUED | OUTPATIENT
Start: 2022-05-10 | End: 2022-05-25 | Stop reason: HOSPADM

## 2022-05-10 RX ORDER — SODIUM CHLORIDE 0.9 % (FLUSH) 0.9 %
5-40 SYRINGE (ML) INJECTION PRN
Status: DISCONTINUED | OUTPATIENT
Start: 2022-05-10 | End: 2022-05-25 | Stop reason: HOSPADM

## 2022-05-10 RX ORDER — SODIUM CHLORIDE, SODIUM LACTATE, POTASSIUM CHLORIDE, CALCIUM CHLORIDE 600; 310; 30; 20 MG/100ML; MG/100ML; MG/100ML; MG/100ML
INJECTION, SOLUTION INTRAVENOUS CONTINUOUS PRN
Status: DISCONTINUED | OUTPATIENT
Start: 2022-05-10 | End: 2022-05-10 | Stop reason: SDUPTHER

## 2022-05-10 RX ORDER — HYDRALAZINE HYDROCHLORIDE 20 MG/ML
10 INJECTION INTRAMUSCULAR; INTRAVENOUS ONCE
Status: COMPLETED | OUTPATIENT
Start: 2022-05-10 | End: 2022-05-10

## 2022-05-10 RX ORDER — SODIUM CHLORIDE 0.9 % (FLUSH) 0.9 %
5-40 SYRINGE (ML) INJECTION EVERY 12 HOURS SCHEDULED
Status: DISCONTINUED | OUTPATIENT
Start: 2022-05-10 | End: 2022-05-13

## 2022-05-10 RX ORDER — SODIUM CHLORIDE 9 MG/ML
INJECTION, SOLUTION INTRAVENOUS PRN
Status: DISCONTINUED | OUTPATIENT
Start: 2022-05-10 | End: 2022-05-25 | Stop reason: HOSPADM

## 2022-05-10 RX ORDER — ONDANSETRON 2 MG/ML
4 INJECTION INTRAMUSCULAR; INTRAVENOUS
Status: ACTIVE | OUTPATIENT
Start: 2022-05-10 | End: 2022-05-10

## 2022-05-10 RX ORDER — DEXAMETHASONE SODIUM PHOSPHATE 10 MG/ML
INJECTION, SOLUTION INTRAMUSCULAR; INTRAVENOUS PRN
Status: DISCONTINUED | OUTPATIENT
Start: 2022-05-10 | End: 2022-05-10 | Stop reason: SDUPTHER

## 2022-05-10 RX ORDER — SODIUM CHLORIDE 0.9 % (FLUSH) 0.9 %
5-40 SYRINGE (ML) INJECTION PRN
Status: DISCONTINUED | OUTPATIENT
Start: 2022-05-10 | End: 2022-05-13 | Stop reason: SDUPTHER

## 2022-05-10 RX ORDER — ROCURONIUM BROMIDE 10 MG/ML
INJECTION, SOLUTION INTRAVENOUS PRN
Status: DISCONTINUED | OUTPATIENT
Start: 2022-05-10 | End: 2022-05-10 | Stop reason: SDUPTHER

## 2022-05-10 RX ORDER — LIDOCAINE HYDROCHLORIDE 10 MG/ML
INJECTION, SOLUTION EPIDURAL; INFILTRATION; INTRACAUDAL; PERINEURAL PRN
Status: DISCONTINUED | OUTPATIENT
Start: 2022-05-10 | End: 2022-05-10 | Stop reason: SDUPTHER

## 2022-05-10 RX ADMIN — HYDRALAZINE HYDROCHLORIDE 10 MG: 20 INJECTION INTRAMUSCULAR; INTRAVENOUS at 06:30

## 2022-05-10 RX ADMIN — LEVETIRACETAM 500 MG: 5 INJECTION INTRAVENOUS at 01:01

## 2022-05-10 RX ADMIN — FENTANYL CITRATE 50 MCG: 50 INJECTION, SOLUTION INTRAMUSCULAR; INTRAVENOUS at 15:00

## 2022-05-10 RX ADMIN — HYDRALAZINE HYDROCHLORIDE 10 MG: 20 INJECTION INTRAMUSCULAR; INTRAVENOUS at 21:27

## 2022-05-10 RX ADMIN — INSULIN GLARGINE 20 UNITS: 100 INJECTION, SOLUTION SUBCUTANEOUS at 08:51

## 2022-05-10 RX ADMIN — DEXTROSE MONOHYDRATE 250 ML: 100 INJECTION, SOLUTION INTRAVENOUS at 16:57

## 2022-05-10 RX ADMIN — IPRATROPIUM BROMIDE AND ALBUTEROL SULFATE 1 AMPULE: 2.5; .5 SOLUTION RESPIRATORY (INHALATION) at 18:27

## 2022-05-10 RX ADMIN — SODIUM CHLORIDE, PRESERVATIVE FREE 20 MG: 5 INJECTION INTRAVENOUS at 08:50

## 2022-05-10 RX ADMIN — METOPROLOL TARTRATE 1 MG: 5 INJECTION, SOLUTION INTRAVENOUS at 14:55

## 2022-05-10 RX ADMIN — LIDOCAINE HYDROCHLORIDE 100 MG: 10 INJECTION, SOLUTION EPIDURAL; INFILTRATION; INTRACAUDAL; PERINEURAL at 14:00

## 2022-05-10 RX ADMIN — ANORECTAL OINTMENT: 15.7; .44; 24; 20.6 OINTMENT TOPICAL at 18:23

## 2022-05-10 RX ADMIN — PROPOFOL 30 MCG/KG/MIN: 10 INJECTION, EMULSION INTRAVENOUS at 07:49

## 2022-05-10 RX ADMIN — HEPARIN SODIUM 5000 UNITS: 5000 INJECTION INTRAVENOUS; SUBCUTANEOUS at 21:28

## 2022-05-10 RX ADMIN — PROPOFOL 50 MG: 10 INJECTION, EMULSION INTRAVENOUS at 14:00

## 2022-05-10 RX ADMIN — ACETYLCYSTEINE 600 MG: 200 SOLUTION ORAL; RESPIRATORY (INHALATION) at 18:27

## 2022-05-10 RX ADMIN — METOPROLOL TARTRATE 2 MG: 5 INJECTION, SOLUTION INTRAVENOUS at 14:18

## 2022-05-10 RX ADMIN — FENTANYL CITRATE 50 MCG: 50 INJECTION, SOLUTION INTRAMUSCULAR; INTRAVENOUS at 14:00

## 2022-05-10 RX ADMIN — HYDRALAZINE HYDROCHLORIDE 10 MG: 20 INJECTION INTRAMUSCULAR; INTRAVENOUS at 13:11

## 2022-05-10 RX ADMIN — CHLORHEXIDINE GLUCONATE 15 ML: 1.2 RINSE ORAL at 08:56

## 2022-05-10 RX ADMIN — Medication 10 ML: at 21:28

## 2022-05-10 RX ADMIN — PROPOFOL 30 MCG/KG/MIN: 10 INJECTION, EMULSION INTRAVENOUS at 00:17

## 2022-05-10 RX ADMIN — FENTANYL CITRATE 50 MCG: 50 INJECTION, SOLUTION INTRAMUSCULAR; INTRAVENOUS at 14:50

## 2022-05-10 RX ADMIN — DEXAMETHASONE SODIUM PHOSPHATE 10 MG: 10 INJECTION, SOLUTION INTRAMUSCULAR; INTRAVENOUS at 14:26

## 2022-05-10 RX ADMIN — HYDRALAZINE HYDROCHLORIDE 10 MG: 20 INJECTION INTRAMUSCULAR; INTRAVENOUS at 18:34

## 2022-05-10 RX ADMIN — LEVETIRACETAM 500 MG: 5 INJECTION INTRAVENOUS at 13:11

## 2022-05-10 RX ADMIN — ROCURONIUM BROMIDE 50 MG: 10 INJECTION, SOLUTION INTRAVENOUS at 14:55

## 2022-05-10 RX ADMIN — DEXTROSE MONOHYDRATE 250 ML: 100 INJECTION, SOLUTION INTRAVENOUS at 20:10

## 2022-05-10 RX ADMIN — Medication 10 ML: at 08:56

## 2022-05-10 RX ADMIN — IPRATROPIUM BROMIDE AND ALBUTEROL SULFATE 1 AMPULE: 2.5; .5 SOLUTION RESPIRATORY (INHALATION) at 11:04

## 2022-05-10 RX ADMIN — ANORECTAL OINTMENT: 15.7; .44; 24; 20.6 OINTMENT TOPICAL at 06:09

## 2022-05-10 RX ADMIN — PROPOFOL 30 MCG/KG/MIN: 10 INJECTION, EMULSION INTRAVENOUS at 17:51

## 2022-05-10 RX ADMIN — SODIUM CHLORIDE, SODIUM LACTATE, POTASSIUM CHLORIDE, AND CALCIUM CHLORIDE: 600; 310; 30; 20 INJECTION, SOLUTION INTRAVENOUS at 14:00

## 2022-05-10 RX ADMIN — IPRATROPIUM BROMIDE AND ALBUTEROL SULFATE 1 AMPULE: 2.5; .5 SOLUTION RESPIRATORY (INHALATION) at 21:38

## 2022-05-10 RX ADMIN — ONDANSETRON 4 MG: 2 INJECTION INTRAMUSCULAR; INTRAVENOUS at 14:26

## 2022-05-10 RX ADMIN — ACETYLCYSTEINE 600 MG: 200 SOLUTION ORAL; RESPIRATORY (INHALATION) at 06:26

## 2022-05-10 RX ADMIN — CHLORHEXIDINE GLUCONATE 15 ML: 1.2 RINSE ORAL at 21:28

## 2022-05-10 RX ADMIN — MIDAZOLAM 2 MG: 1 INJECTION INTRAMUSCULAR; INTRAVENOUS at 14:00

## 2022-05-10 RX ADMIN — IPRATROPIUM BROMIDE AND ALBUTEROL SULFATE 1 AMPULE: 2.5; .5 SOLUTION RESPIRATORY (INHALATION) at 01:54

## 2022-05-10 RX ADMIN — HEPARIN SODIUM 5000 UNITS: 5000 INJECTION INTRAVENOUS; SUBCUTANEOUS at 17:23

## 2022-05-10 RX ADMIN — ROCURONIUM BROMIDE 50 MG: 10 INJECTION, SOLUTION INTRAVENOUS at 14:00

## 2022-05-10 RX ADMIN — IPRATROPIUM BROMIDE AND ALBUTEROL SULFATE 1 AMPULE: 2.5; .5 SOLUTION RESPIRATORY (INHALATION) at 06:26

## 2022-05-10 ASSESSMENT — PAIN SCALES - GENERAL
PAINLEVEL_OUTOF10: 0
PAINLEVEL_OUTOF10: 3
PAINLEVEL_OUTOF10: 0

## 2022-05-10 ASSESSMENT — PULMONARY FUNCTION TESTS
PIF_VALUE: 20
PIF_VALUE: 18
PIF_VALUE: 21
PIF_VALUE: 17
PIF_VALUE: 19
PIF_VALUE: 16
PIF_VALUE: 18
PIF_VALUE: 20
PIF_VALUE: 19
PIF_VALUE: 17
PIF_VALUE: 18
PIF_VALUE: 21
PIF_VALUE: 16
PIF_VALUE: 18
PIF_VALUE: 19
PIF_VALUE: 19
PIF_VALUE: 17
PIF_VALUE: 18
PIF_VALUE: 17
PIF_VALUE: 16
PIF_VALUE: 19
PIF_VALUE: 17
PIF_VALUE: 19
PIF_VALUE: 17
PIF_VALUE: 18

## 2022-05-10 ASSESSMENT — LIFESTYLE VARIABLES: SMOKING_STATUS: 0

## 2022-05-10 NOTE — ANESTHESIA PRE PROCEDURE
Department of Anesthesiology  Preprocedure Note       Name:  James Shah   Age:  47 y.o.  :  1970                                          MRN:  [id number]         Date:  5/10/2022      Surgeon: Missy Lama):  Robert Deleon MD    Procedure: Procedure(s):  PERCUTANEOUS TRACHEOSTOMY  PERCUTANEOUS ENDOSCOPIC GASTROSTOMY TUBE PLACEMENT,    Medications prior to admission:   Prior to Admission medications    Medication Sig Start Date End Date Taking?  Authorizing Provider   DULoxetine (CYMBALTA) 60 MG extended release capsule Take 1 capsule by mouth daily Hold Cymbalta while taking Zyvox Antibiotic 2/3/22   ROMINA Grimm CNP   insulin glargine (BASAGLAR KWIKPEN) 100 UNIT/ML injection pen Inject 5 Units into the skin nightly Indications: Diabetes 22   Hadley Sandy MD   insulin aspart (NOVOLOG FLEXPEN) 100 UNIT/ML injection pen Inject 4 Units into the skin 3 times daily (before meals) Indications: Diabetes 22   Hadley Sandy MD   lactulose (3001 UGOBE) 10 GM/15ML solution Take 15 g by mouth 3 times daily With meals for constipation, hold for loose stools    Historical Provider, MD   cloNIDine (CATAPRES) 0.1 MG/24HR PTWK Place 1 patch onto the skin once a week 21   ROMINA Yap CNP   nicotine (NICODERM CQ) 14 MG/24HR Place 1 patch onto the skin daily 21   ROMINA Yap CNP   isosorbide mononitrate (IMDUR) 120 MG extended release tablet Take 1 tablet by mouth daily 21   ROMINA Yap CNP   hydrALAZINE (APRESOLINE) 100 MG tablet Take 1 tablet by mouth every 8 hours 21   ROMINA Yap CNP   metoprolol succinate (TOPROL XL) 100 MG extended release tablet Take 1 tablet by mouth every morning (before breakfast)  Patient taking differently: Take 150 mg by mouth every morning (before breakfast)  21   ROMINA Yap CNP   NIFEdipine (PROCARDIA XL) 30 MG extended release tablet Take 3 tablets by mouth daily 11/26/21   Rosalea Iron, APRN - CNP   Ergocalciferol (VITAMIN D2) 10 MCG (400 UNIT) TABS Take 1.25 mg by mouth every 14 days    Historical Provider, MD   senna (SENNA-LAX) 8.6 MG tablet Take 1 tablet by mouth daily    Historical Provider, MD   sevelamer (RENVELA) 800 MG tablet Take 1 tablet by mouth 3 times daily (with meals)    Historical Provider, MD   albuterol sulfate  (90 Base) MCG/ACT inhaler Inhale 2 puffs into the lungs every 4 hours as needed    Historical Provider, MD   levothyroxine (SYNTHROID) 150 MCG tablet Take 150 mcg by mouth Daily    Historical Provider, MD   rOPINIRole (REQUIP) 2 MG tablet Take 4 mg by mouth nightly Indications: Restless Leg Syndrome     Historical Provider, MD   simvastatin (ZOCOR) 40 MG tablet Take 40 mg by mouth nightly     Historical Provider, MD       Current medications:    Current Facility-Administered Medications   Medication Dose Route Frequency Provider Last Rate Last Admin    vancomycin (VANCOCIN) intermittent dosing (placeholder)   Other RX Placeholder Graeme Omer,         aminoglycoside intermittent dosing (placeholder)   Other RX Placeholder Graeme Omer, DO        acetylcysteine (MUCOMYST) 20 % solution 600 mg  600 mg Inhalation BID Graeme Lava, DO   600 mg at 05/10/22 2844    ipratropium-albuterol (DUONEB) nebulizer solution 1 ampule  1 ampule Inhalation Q4H Graeem Omer, DO   1 ampule at 05/10/22 1104    insulin glargine (LANTUS) injection vial 20 Units  20 Units SubCUTAneous BID Graeme Omer, DO   20 Units at 05/10/22 0851    hydrALAZINE (APRESOLINE) injection 10 mg  10 mg IntraVENous Q6H PRN Kay Singh MD   10 mg at 05/10/22 1311    aspirin chewable tablet 81 mg  81 mg Oral Daily Kay Singh MD   81 mg at 05/09/22 0824    racepinephrine HCl (VAPONEFPRIN) 2.25 % nebulizer solution NEBU 11.25 mg  11.25 mg Nebulization Q4H PRN Kay Singh MD   11.25 mg at 05/03/22 1408    sodium chloride nebulizer 0.9 % solution 3 mL  3 mL Nebulization Q4H PRN Kaylie Damian MD        propofol injection  5-50 mcg/kg/min IntraVENous Continuous Kaylie Damian MD 10.8 mL/hr at 05/10/22 1200 30 mcg/kg/min at 05/10/22 1200    heparin (porcine) injection 5,000 Units  5,000 Units SubCUTAneous TID Kaylie Damian MD   5,000 Units at 05/09/22 2118    Peppermint Spirit SPRT   Does not apply PRN Poly Chafe, DO        dexmedetomidine (PRECEDEX) 400 mcg in sodium chloride 0.9 % 100 mL infusion  0.1-1.5 mcg/kg/hr IntraVENous Continuous Poly Sofíafe, DO   Stopped at 05/03/22 1143    menthol-zinc oxide (CALMOSEPTINE) 0.44-20.6 % ointment   Topical BID Kaylie Damian MD   Given at 05/10/22 0609    sodium phosphate 10.5 mmol in sodium chloride 0.9 % 250 mL IVPB  0.16 mmol/kg IntraVENous PRN Patience Walter, DO        Or    sodium phosphate 21 mmol in sodium chloride 0.9 % 250 mL IVPB  0.32 mmol/kg IntraVENous PRN Patience Walter, DO        insulin lispro (HUMALOG) injection vial 0-12 Units  0-12 Units SubCUTAneous TID WC Poly Sofíafe, DO   4 Units at 05/08/22 1740    insulin lispro (HUMALOG) injection vial 0-6 Units  0-6 Units SubCUTAneous Nightly Poly Chafe, DO   1 Units at 05/09/22 2119    hydrALAZINE (APRESOLINE) tablet 100 mg  100 mg Oral 3 times per day Patience Walter, DO   100 mg at 05/09/22 2118    glucagon (rDNA) injection 1 mg  1 mg IntraMUSCular PRN Poly Chafe, DO        dextrose 5 % solution  100 mL/hr IntraVENous PRN Poly Chafe, DO        glucose chewable tablet 16 g  4 tablet Oral PRN Poly Chafe, DO        dextrose bolus (hypoglycemia) 10% 125 mL  125 mL IntraVENous PRN Poly Chafe, DO   Stopped at 05/09/22 1250    Or    dextrose bolus (hypoglycemia) 10% 250 mL  250 mL IntraVENous PRN Poly Chafe, DO        sodium chloride flush 0.9 % injection 5-40 mL  5-40 mL IntraVENous 2 times per day Poly Choi DO   10 mL at 05/10/22 1537    sodium chloride flush 0.9 % injection 5-40 mL  5-40 mL IntraVENous PRN Raydell Fought, DO        0.9 % sodium chloride infusion   IntraVENous PRN Raydell Fought, DO   Stopped at 05/03/22 2005    LORazepam (ATIVAN) injection 1 mg  1 mg IntraVENous Q5 Min PRN Raydell Fought, DO        ondansetron (ZOFRAN-ODT) disintegrating tablet 4 mg  4 mg Oral Q8H PRN Raydell Fought, DO        Or    ondansetron [facility]) injection 4 mg  4 mg IntraVENous Q6H PRN Raydell Fought, DO        polyethylene glycol [facility]) packet 17 g  17 g Oral Daily PRN Raydell Fought, DO        acetaminophen (TYLENOL) tablet 650 mg  650 mg Oral Q6H PRN Raydell Fought, DO   650 mg at 04/25/22 5694    Or    acetaminophen (TYLENOL) suppository 650 mg  650 mg Rectal Q6H PRN Raydell Fought, DO        levETIRAcetam (KEPPRA) 500 mg/100 mL IVPB  500 mg IntraVENous Q12H Raydell Fought, DO   Stopped at 05/10/22 1351    chlorhexidine (PERIDEX) 0.12 % solution 15 mL  15 mL Mouth/Throat BID Raydell Fought, DO   15 mL at 05/10/22 0856    famotidine (PEPCID) 20 mg in sodium chloride (PF) 10 mL injection  20 mg IntraVENous Daily Raydell Fought, DO   20 mg at 05/10/22 7808    magic butt cream   Topical Q4H PRN Raydell Fought, DO   Given at 05/03/22 0940    magnesium sulfate 1000 mg in dextrose 5% 100 mL IVPB  1,000 mg IntraVENous PRN Patience Walter, DO        potassium bicarb-citric acid (EFFER-K) effervescent tablet 40 mEq  40 mEq Oral PRN Patience Walter, DO        Or    potassium chloride 10 mEq/100 mL IVPB (Peripheral Line)  10 mEq IntraVENous PRN Patience Walter, DO   Stopped at 04/25/22 0936       Allergies:     Allergies   Allergen Reactions    Penicillins Hives and Shortness Of Breath    Lamisil Advanced [Terbinafine] Nausea And Vomiting    Reglan [Metoclopramide] Other (See Comments)     Body twitching and crawling out of her skin feeling    Stadol [Butorphanol] Nausea And Vomiting     And made me feel crazy       Problem List:    Patient Active Problem List   Diagnosis Code    Gastroesophageal reflux disease without esophagitis K21.9    Acquired hypothyroidism E03.9    Anemia in chronic kidney disease (CKD) N18.9, D63.1    Iron deficiency E61.1    Family history of colon cancer Z80.0    Unintentional weight loss R63.4    Type 2 diabetes mellitus with chronic kidney disease on chronic dialysis, with long-term current use of insulin ([address] E11.22, N18.6, Z99.2, Z79.4    Acute encephalopathy G93.40    Noncompliance of patient with renal dialysis ([address] Z91.15    Respiratory failure ([address] J96.90    Acute respiratory failure with hypoxia and hypercapnia ([address] J96.01, J96.02    Dialysis AV fistula malfunction (HCC) T82.590A    Gastroparesis K31.84    Nausea and vomiting R11.2    Chronic constipation K59.09    Uncontrolled type 2 diabetes mellitus with complication, with long-term current use of insulin (HCC) E11.8, E11.65, Z79.4    Abnormal CT of liver R93.2    Other ascites R18.8    ESRD (end stage renal disease) on dialysis (HCC) N18.6, Z99.2    High hepatic iron concentration determined by biopsy of liver E83.19    Steal syndrome as complication of dialysis access (HCC) U24.480N    PVD (peripheral vascular disease) (HCC) I73.9    ESRD on hemodialysis (HCC) N18.6, Z99.2    Hyponatremia E87.1    Hyperglycemia due to type 2 diabetes mellitus (HCC) E11.65    Normocytic anemia D64.9    Volume overload E87.70    Left homonymous hemianopsia H53.462    Acute intractable headache R51.9    Uncontrolled hypertension I10    Medically noncompliant Z91.19    Noncompliance with medications Z91.14    Seizure ([address] R56.9    Hyperglycemic hyperosmolar nonketotic coma (HCC) E11.01    Type 2 diabetes mellitus with hyperosmolar coma, with long-term current use of insulin ([address] E11.01, Z79.4    Palliative care patient Z51.5    Hypertensive emergency I16.1    Chronic epigastric pain R10.13, G89.29    Chronic nausea R11.0    Chronic vomiting R11.10    Abnormal CT of the abdomen R93.5    Poor appetite R63.0    Personal history of colonic polyps Z86.010    Hyperosmolar syndrome E87.0    Uncontrolled type 2 diabetes mellitus with hyperosmolar nonketotic hyperglycemia (HCC) E11.00    Hypertensive urgency I16.0    Altered mental state R41.82    Sacroiliitis ([facility]) M46.1    Lumbar radiculopathy M54.16    Left leg pain M79.605    Left leg swelling M79.89    Unable to ambulate R26.2    Discharge from nipple N64.52    Severe hyperglycemia due to diabetes mellitus ([facility]) E11.65    Acute hypoxemic respiratory failure ([facility]) J96.01    Generalized weakness R53.1    UTI (urinary tract infection) N39.0    Hyperosmolar hyperglycemic state (HHS) ([facility]) E11.00, E11.65    Hyperglycemia R73.9    Hypoglycemia associated with diabetes ([facility]) E11.649    Closed fracture of left distal femur ([facility]) S72.402A    Closed fracture of distal end of left femur, initial encounter ([email or web address] S72.402A    Enterococcus UTI N39.0, B95.2    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    Acute ischemic stroke ([facility]) I63.9    Weakness R53.1    Severe malnutrition ([facility]) E43       Past Medical History:        Diagnosis Date    Acute ischemic stroke ([email or web address] 4/30/2022    Arthritis     Chronic kidney disease, stage 5, kidney failure ([facility])     Closed fracture of right shoulder girdle, with routine healing, subsequent encounter     DM (diabetes mellitus) type II controlled with renal manifestation ([email or web address] 06/1993    Gastroparesis     GERD (gastroesophageal reflux disease)     Hemodialysis patient ([email or web address]     dialysis on tues, thur, and sat at [facility]    Hypertension     Hypothyroid     Nasal congestion     recent    Neuropathy     Noncompliance with medications     Palliative care patient 10/08/2019    Restless leg syndrome        Past Surgical History: Procedure Laterality Date    BREAST SURGERY Left 2008    infected milk gland removed    BREAST SURGERY Right 5/25/2021    WEDGE EXCISION RIGHT BREAST DUCT WITH LACRIMAL DUCT PROBE, PEC BLOCK performed by Cat Dinero MD at [address], [facility]      2008    COLONOSCOPY Left 9/17/2020    Dr Sahara Batista hepatic flexure-Severe tortuosity with severe spasming, 1 yr recall    DIALYSIS FISTULA CREATION Left 1/25/2019    REVISION LEFT UPPER EXTREMITY AV ACCESS WITH LEFT BRACHIAL ARTERY TO LEFT AXILLARY VEIN WITH  INTERPOSITIONAL ARTEGRAFT   performed by Myrna Lee MD at [address]  2012    [address] Right 1/25/2019    INSERTION OF RIGHT INTERNAL JUGULAR HEMODIALYSIS CATHETER performed by Myrna Lee MD at [address] [facility]  08/17/2018    1.  Moderately increased stainable iron identified by special stain in Kupffer cells and occasional hepatocytes. Negative for evidence of significant portal or lobular inflammation. Negative for evidence of significant fibrosis    CT COLONOSCOPY W/BIOPSY SINGLE/MULTIPLE N/A 10/20/2017    Dr Silva Patches prep-Tubular AP (-) dysplasia x 2--3 yr recall    CT EGD TRANSORAL BIOPSY SINGLE/MULTIPLE N/A 12/27/2016    Dr Moon Boards EGD TRANSORAL BIOPSY SINGLE/MULTIPLE N/A 10/20/2017    Dr Devin Ahn (-)   [address] VASCULAR SURGERY Left 2016    fistula by Dr Summer Crandall at [address]  10/02/2017    [facility]. Left upper fistulograms/venograms, balloon angioplasty cephalic vein arch [id number] conquest.    VASCULAR SURGERY  08/2018    FISTULOGRAM    VASCULAR SURGERY  10/29/2018    [facility]. Left upper fistulograms/venograms    VASCULAR SURGERY  12/19/2018    [facility]. Arch aortogram,left upper arteriograms.  VASCULAR SURGERY  03/06/2019    [facility]. Removal of tunneled dialyis catheter right internal jugular vein.  VASCULAR SURGERY  08/28/2019    [facility]. Left upper extremity fistulogram including venography to the superior vena cava. Balloon angioplasty left subclavian vein with 10mm x 40mm conquest balloon. Balloon angioplasty mid/proximal upper arm cephalic vein with 17SD x 40mm conquest balloon. Venograms after balloon angioplasty. Stent left mid/proximal upper arm cephalic vein stenosis fluency 10mm x 60mm self-expanding covered stent. Balloon     VASCULAR SURGERY  08/28/2019    cont angioplasty stent with 10mm x 40mm conquest balloon. Completion venograms left upper extremity. Social History:    Social History     Tobacco Use    Smoking status: Current Every Day Smoker     Packs/day: 0.50     Years: 33.00     Pack years: 16.50     Types: Cigarettes    Smokeless tobacco: Never Used    Tobacco comment: NOW at 1/4 PD, started at age 24 and has averaged 2 PPD   Substance Use Topics    Alcohol use: No                                Ready to quit: Not Answered  Counseling given: Not Answered  Comment: NOW at 1/4 PD, started at age 24 and has averaged 2 PPD      Vital Signs (Current):   Vitals:    05/10/22 1100 05/10/22 1200 05/10/22 1300 05/10/22 1353   BP: (!) 148/64 (!) 171/69 (!) 170/69    Pulse: 93 93 92 90   Resp: 14 17 14 11   Temp:  98 °F (36.7 °C)     TempSrc:  Axillary     SpO2: 100% 98% 100% 100%   Weight:       Height:                                                  BP Readings from Last 3 Encounters:   05/10/22 (!) 170/69   04/05/22 (!) 203/81   01/27/22 (!) 145/70       NPO Status:                                                                                 BMI:   Wt Readings from Last 3 Encounters:   05/10/22 129 lb 14.4 oz (58.9 kg)   04/04/22 162 lb 3 oz (73.6 kg)   01/25/22 144 lb (65.3 kg)     Body mass index is 20.97 kg/m².     CBC:   Lab Results   Component Value Date    WBC 7.2 05/10/2022    RBC 2.97 05/10/2022    HGB 8.4 05/10/2022    HCT 28.5 05/10/2022    MCV 96.0 05/10/2022    RDW 19.1 05/10/2022     05/10/2022       CMP:   Lab Results   Component Value Date     05/10/2022 K 4.4 05/10/2022    K 3.6 04/24/2022    CL 90 05/10/2022    CO2 24 05/10/2022    BUN 45 05/10/2022    CREATININE 2.8 05/10/2022    GFRAA 21 05/10/2022    LABGLOM 18 05/10/2022    GLUCOSE 197 05/10/2022    PROT 6.0 05/10/2022    CALCIUM 9.0 05/10/2022    BILITOT 0.3 05/10/2022    ALKPHOS 90 05/10/2022    AST 14 05/10/2022    ALT 7 05/10/2022       POC Tests:   Recent Labs     05/10/22  1103   POCGLU 89       Coags:   Lab Results   Component Value Date    PROTIME 13.1 05/10/2022    INR 1.00 05/10/2022    APTT 29.1 01/01/2022       HCG (If Applicable):   Lab Results   Component Value Date    PREGTESTUR Negative 09/16/2020        ABGs:   Lab Results   Component Value Date    PHART 7.540 05/04/2022    PO2ART 119.0 05/04/2022    XQT3JTZ 36.0 05/04/2022    KGI8QHR 30.8 05/04/2022    BEART 7.8 05/04/2022    T2PFHUWT 96.9 05/04/2022        Type & Screen (If Applicable):  No results found for: LABABO, LABRH    Drug/Infectious Status (If Applicable):  No results found for: HIV, HEPCAB    COVID-19 Screening (If Applicable):   Lab Results   Component Value Date    COVID19 Not Detected 05/04/2022           Anesthesia Evaluation  Patient summary reviewed no history of anesthetic complications:   Airway: Mallampati: Unable to assess / NA  TM distance: >3 FB   Neck ROM: full  Comment: Intubated  Mouth opening: > = 3 FB Dental:          Pulmonary:normal exam  breath sounds clear to auscultation      (-) asthma, recent URI, sleep apnea and not a current smoker                           Cardiovascular:  Exercise tolerance: good (>4 METS),   (+) hypertension:,     (-) pacemaker, past MI, CABG/stent and  angina    ECG reviewed  Rhythm: regular  Rate: normal  Echocardiogram reviewed         Beta Blocker:  Dose within 24 Hrs         Neuro/Psych:   (+) CVA (4/29/22): residual symptoms, neuromuscular disease:, headaches:,    (-) seizures and TIA           GI/Hepatic/Renal:   (+) GERD:, renal disease: dialysis and ESRD,      (-) liver disease       Endo/Other:    (+) DiabetesType II DM, using insulin, hypothyroidism::., .    (-) hyperthyroidism               Abdominal:             Vascular: Other Findings:             Anesthesia Plan      general     ASA 4       Induction: intravenous. MIPS: Postoperative opioids intended and Prophylactic antiemetics administered. Anesthetic plan and risks discussed with patient.                       Laurie Nunes MD   5/10/2022

## 2022-05-10 NOTE — PROGRESS NOTES
Hospitalist Progress Note    Patient:  Helena Luu  YOB: 1970  Date of Service: 5/10/2022  MRN: [id number]   Acct: [de-identified]   Primary Care Physician: ROMINA Adamson  Advance Directive: Full Code  Admit Date: 4/24/2022       Hospital Day: 16  Referring Provider: Fran Bowen DO    Patient Seen, Chart, Consults, Notes, Labs, Radiology studies reviewed. Subjective:  Helena Luu is a 47 y.o. female  whom we are following for left MCA infarct, end-stage renal disease, chronic respiratory failure. Patient was seen earlier this morning. She is now currently down in surgery getting her trach and PEG. She is on assist-control rate of 14, tidal volume of 450, 5 of PEEP, FiO2 of 0.35.     Allergies:  Penicillins, Lamisil advanced [terbinafine], Reglan [metoclopramide], and Stadol [butorphanol]    Medicines:  Current Facility-Administered Medications   Medication Dose Route Frequency Provider Last Rate Last Admin    vancomycin (VANCOCIN) intermittent dosing (placeholder)   Other RX Placeholder Fran Bowen DO        aminoglycoside intermittent dosing (placeholder)   Other RX Placeholder Fran Bowen DO        acetylcysteine (MUCOMYST) 20 % solution 600 mg  600 mg Inhalation BID Fran Bowen DO   600 mg at 05/10/22 2076    ipratropium-albuterol (DUONEB) nebulizer solution 1 ampule  1 ampule Inhalation Q4H Fran Bowen DO   1 ampule at 05/10/22 1104    insulin glargine (LANTUS) injection vial 20 Units  20 Units SubCUTAneous BID Fran Bowen DO   20 Units at 05/10/22 3318    hydrALAZINE (APRESOLINE) injection 10 mg  10 mg IntraVENous Q6H PRN Lary Lennox, MD   10 mg at 05/10/22 1311    aspirin chewable tablet 81 mg  81 mg Oral Daily Lary Lennox, MD   81 mg at 05/09/22 0824    racepinephrine HCl (VAPONEFPRIN) 2.25 % nebulizer solution NEBU 11.25 mg  11.25 mg Nebulization Q4H PRN Lary Lennox, MD   11.25 mg at 05/03/22 1408    sodium chloride nebulizer 0.9 % solution 3 mL  3 mL Nebulization Q4H PRN Romelia Jackson MD        propofol injection  5-50 mcg/kg/min IntraVENous Continuous Romelia Jackson MD 10.8 mL/hr at 05/10/22 1200 30 mcg/kg/min at 05/10/22 1200    heparin (porcine) injection 5,000 Units  5,000 Units SubCUTAneous TID Romelia Jcakson MD   5,000 Units at 05/09/22 2118    Peppermint Spirit SPRT   Does not apply PRN Alphonso Hikes, DO        dexmedetomidine (PRECEDEX) 400 mcg in sodium chloride 0.9 % 100 mL infusion  0.1-1.5 mcg/kg/hr IntraVENous Continuous Alphonso Hikes, DO   Stopped at 05/03/22 1143    menthol-zinc oxide (CALMOSEPTINE) 0.44-20.6 % ointment   Topical BID Romelia Jackson MD   Given at 05/10/22 0609    sodium phosphate 10.5 mmol in sodium chloride 0.9 % 250 mL IVPB  0.16 mmol/kg IntraVENous PRN Patience Walter, DO        Or    sodium phosphate 21 mmol in sodium chloride 0.9 % 250 mL IVPB  0.32 mmol/kg IntraVENous PRN Patience Walter, DO        insulin lispro (HUMALOG) injection vial 0-12 Units  0-12 Units SubCUTAneous TID WC Alphonso Hikes, DO   4 Units at 05/08/22 1740    insulin lispro (HUMALOG) injection vial 0-6 Units  0-6 Units SubCUTAneous Nightly Alphonso Hikes, DO   1 Units at 05/09/22 2119    hydrALAZINE (APRESOLINE) tablet 100 mg  100 mg Oral 3 times per day Patience Walter, DO   100 mg at 05/09/22 2118    glucagon (rDNA) injection 1 mg  1 mg IntraMUSCular PRN Alphonso Hikes, DO        dextrose 5 % solution  100 mL/hr IntraVENous PRN Alphonso Hikes, DO        glucose chewable tablet 16 g  4 tablet Oral PRN Alphonso Hikes, DO        dextrose bolus (hypoglycemia) 10% 125 mL  125 mL IntraVENous PRN Alphonso Hikes, DO   Stopped at 05/09/22 1250    Or    dextrose bolus (hypoglycemia) 10% 250 mL  250 mL IntraVENous PRN Alphonso Hikes, DO        sodium chloride flush 0.9 % injection 5-40 mL  5-40 mL IntraVENous 2 times per day Alphonso Hikes, DO   10 mL at 05/10/22 5871    sodium chloride flush 0.9 % injection 5-40 mL  5-40 mL IntraVENous PRN Isacc Duane, DO        0.9 % sodium chloride infusion   IntraVENous PRN Isacc Duane, DO   Stopped at 05/03/22 2005    LORazepam (ATIVAN) injection 1 mg  1 mg IntraVENous Q5 Min PRN Isacc Duane, DO        ondansetron (ZOFRAN-ODT) disintegrating tablet 4 mg  4 mg Oral Q8H PRN Isacc Duane, DO        Or    ondansetron [facility]) injection 4 mg  4 mg IntraVENous Q6H PRN Isacc Duane, DO        polyethylene glycol [facility]) packet 17 g  17 g Oral Daily PRN Isacc Duane, DO        acetaminophen (TYLENOL) tablet 650 mg  650 mg Oral Q6H PRN Isacc Duane, DO   650 mg at 04/25/22 2578    Or    acetaminophen (TYLENOL) suppository 650 mg  650 mg Rectal Q6H PRN Isacc Duane, DO        levETIRAcetam (KEPPRA) 500 mg/100 mL IVPB  500 mg IntraVENous Q12H Isacc Duane, DO   Stopped at 05/10/22 1351    chlorhexidine (PERIDEX) 0.12 % solution 15 mL  15 mL Mouth/Throat BID Isacc Duane, DO   15 mL at 05/10/22 0856    famotidine (PEPCID) 20 mg in sodium chloride (PF) 10 mL injection  20 mg IntraVENous Daily Isacc Duane, DO   20 mg at 05/10/22 6326    magic butt cream   Topical Q4H PRN Isacc Duane, DO   Given at 05/03/22 0940    magnesium sulfate 1000 mg in dextrose 5% 100 mL IVPB  1,000 mg IntraVENous PRN Patience Walter, DO        potassium bicarb-citric acid (EFFER-K) effervescent tablet 40 mEq  40 mEq Oral PRN Patience Walter, DO        Or    potassium chloride 10 mEq/100 mL IVPB (Peripheral Line)  10 mEq IntraVENous PRN Patience Walter, DO   Stopped at 04/25/22 8657     Facility-Administered Medications Ordered in Other Encounters   Medication Dose Route Frequency Provider Last Rate Last Admin    metoprolol (LOPRESSOR) injection   IntraVENous PRN ROMINA Walker - CRNA   1 mg at 05/10/22 1455    lactated ringers infusion   IntraVENous Continuous PRN Serena Oseas, APRN - CRNA   New Bag at 05/10/22 1400    lidocaine PF 1 % injection   IntraVENous PRN Serena Oseas, APRN - CRNA   100 mg at 05/10/22 1400    propofol injection   IntraVENous PRN Serena Oseas, APRN - CRNA   50 mg at 05/10/22 1400    midazolam (VERSED) injection   IntraVENous PRN Serena Oseas, APRN - CRNA   2 mg at 05/10/22 1400    fentaNYL (SUBLIMAZE) injection   IntraVENous PRN Serena Oseas, APRN - CRNA   50 mcg at 05/10/22 1500    rocuronium (ZEMURON) injection   IntraVENous PRN Serena Oseas, APRN - CRNA   50 mg at 05/10/22 1455    dexamethasone (PF) (DECADRON) injection   IntraVENous PRN Serena Oseas, APRN - CRNA   10 mg at 05/10/22 1426    ondansetron (Meenakshi Josef) injection   IntraVENous PRN Serena Oseas, APRN - CRNA   4 mg at 05/10/22 1426       Past Medical History:  Past Medical History:   Diagnosis Date    Acute ischemic stroke ([facility] [address] 4/30/2022    Arthritis     Chronic kidney disease, stage 5, kidney failure ([address]     Closed fracture of right shoulder girdle, with routine healing, subsequent encounter     DM (diabetes mellitus) type II controlled with renal manifestation ([address] 06/1993    Gastroparesis     GERD (gastroesophageal reflux disease)     Hemodialysis patient ([facility] [address]     dialysis on tues, thur, and sat [facility]    Hypertension     Hypothyroid     Nasal congestion     recent    Neuropathy     Noncompliance with medications     Palliative care patient 10/08/2019    Restless leg syndrome        Past Surgical History:  Past Surgical History:   Procedure Laterality Date    BREAST SURGERY Left 2008    infected milk gland removed    BREAST SURGERY Right 5/25/2021    WEDGE EXCISION RIGHT BREAST DUCT WITH LACRIMAL DUCT PROBE, PEC BLOCK performed by Kalie He MD at [address] [place] [address], LAPAROSCOPIC      2008    COLONOSCOPY Left 9/17/2020    Dr Ernst Lovett hepatic flexure-Severe tortuosity with severe spasming, 1 yr recall    DIALYSIS FISTULA CREATION Left 1/25/2019    REVISION LEFT UPPER EXTREMITY AV ACCESS WITH LEFT BRACHIAL ARTERY TO LEFT AXILLARY VEIN WITH  INTERPOSITIONAL ARTEGRAFT   performed by Zachary Guzmán MD at [address]  2012    [address] Right 1/25/2019    INSERTION OF RIGHT INTERNAL JUGULAR HEMODIALYSIS CATHETER performed by Zachary Guzmán MD at [address] [facility]  08/17/2018    1.  Moderately increased stainable iron identified by special stain in Kupffer cells and occasional hepatocytes. Negative for evidence of significant portal or lobular inflammation. Negative for evidence of significant fibrosis    LA COLONOSCOPY W/BIOPSY SINGLE/MULTIPLE N/A 10/20/2017    Dr Radha Collazo prep-Tubular AP (-) dysplasia x 2--3 yr recall    LA EGD TRANSORAL BIOPSY SINGLE/MULTIPLE N/A 12/27/2016    Dr Bia Pitts EGD TRANSORAL BIOPSY SINGLE/MULTIPLE N/A 10/20/2017    Dr Lizy Prieto (-)   [address] [facility] VASCULAR SURGERY Left 2016    fistula by Dr Dick Bacon at [address]  10/02/2017    [facility]. Left upper fistulograms/venograms, balloon angioplasty cephalic vein arch 81T33 conquest.    VASCULAR SURGERY  08/2018    FISTULOGRAM    VASCULAR SURGERY  10/29/2018    [facility]. Left upper fistulograms/venograms    VASCULAR SURGERY  12/19/2018    [facility]. Arch aortogram,left upper arteriograms.  VASCULAR SURGERY  03/06/2019    [facility]. Removal of tunneled dialyis catheter right internal jugular vein.  VASCULAR SURGERY  08/28/2019    [facility]. Left upper extremity fistulogram including venography to the superior vena cava. Balloon angioplasty left subclavian vein with 10mm x 40mm conquest balloon. Balloon angioplasty mid/proximal upper arm cephalic vein with 95PV x 40mm conquest balloon. Venograms after balloon angioplasty. Stent left mid/proximal upper arm cephalic vein stenosis fluency 10mm x 60mm self-expanding covered stent. Balloon     VASCULAR SURGERY  08/28/2019    cont angioplasty stent with 10mm x 40mm conquest balloon. Completion venograms left upper extremity. Family History  Family History   Problem Relation Age of Onset    Colon Cancer Mother          at 63    Colon Polyps Mother     Lung Cancer Father          at 79    Colon Polyps Father     Stomach Cancer Sister     Breast Cancer Sister 27    Depression Daughter 25        committed suicide    Liver Cancer Neg Hx     Liver Disease Neg Hx     Esophageal Cancer Neg Hx     Rectal Cancer Neg Hx        Social History  Social History     Socioeconomic History    Marital status: Single     Spouse name: Not on file    Number of children: 2    Years of education: Not on file    Highest education level: Not on file   Occupational History    Not on file   Tobacco Use    Smoking status: Current Every Day Smoker     Packs/day: 0.50     Years: 33.00     Pack years: 16.50     Types: Cigarettes    Smokeless tobacco: Never Used    Tobacco comment: NOW at 1/4 PD, started at age 24 and has averaged 2 PPD   Vaping Use    Vaping Use: Never used   Substance and Sexual Activity    Alcohol use: No    Drug use: Not Currently     Types: Marijuana Lovena Mems)    Sexual activity: Not Currently     Partners: Male   Other Topics Concern    Not on file   Social History Narrative    Not on file     Social Determinants of Health     Financial Resource Strain:     Difficulty of Paying Living Expenses: Not on file   Food Insecurity:     Worried About Running Out of Food in the Last Year: Not on file    Kendy of Food in the Last Year: Not on file   Transportation Needs:     Lack of Transportation (Medical): Not on file    Lack of Transportation (Non-Medical):  Not on file   Physical Activity:     Days of Exercise per Week: Not on file    Minutes of Exercise per Session: Not on file   Stress:     Feeling of Stress : Not on file   Social Connections:     Frequency of Communication with Friends and Family: Not on file    Frequency of Social Gatherings with Friends and Family: Not on file    Attends Latter day Services: Not on file    Active Member of Clubs or Organizations: Not on file    Attends Club or Organization Meetings: Not on file    Marital Status: Not on file   Intimate Partner Violence:     Fear of Current or Ex-Partner: Not on file    Emotionally Abused: Not on file    Physically Abused: Not on file    Sexually Abused: Not on file   Housing Stability:     Unable to Pay for Housing in the Last Year: Not on file    Number of [place] in the Last Year: Not on file    Unstable Housing in the Last Year: Not on file         Review of Systems:    Review of Systems   Unable to perform ROS: Intubated           Objective:  Blood pressure (!) 170/69, pulse 90, temperature 98 °F (36.7 °C), temperature source Axillary, resp. rate 11, height 5' 6\" (1.676 m), weight 129 lb 14.4 oz (58.9 kg), SpO2 100 %. Intake/Output Summary (Last 24 hours) at 5/10/2022 1530  Last data filed at 5/10/2022 1200  Gross per 24 hour   Intake 1543.54 ml   Output 10 ml   Net 1533.54 ml       Physical Exam  Vitals and nursing note reviewed. Constitutional:       Appearance: She is ill-appearing. Interventions: She is sedated and intubated. HENT:      Head: Normocephalic and atraumatic. Right Ear: External ear normal.      Left Ear: External ear normal.      Nose: Nose normal.      Mouth/Throat:      Mouth: Mucous membranes are moist.   Eyes:      Conjunctiva/sclera: Conjunctivae normal.   Cardiovascular:      Rate and Rhythm: Normal rate and regular rhythm. Heart sounds: Normal heart sounds. Pulmonary:      Effort: She is intubated. Breath sounds: Normal breath sounds. Abdominal:      General: Abdomen is flat. Palpations: Abdomen is soft. Musculoskeletal:      Cervical back: Neck supple. No rigidity. No muscular tenderness. Skin:     General: Skin is warm and dry.          Labs:  BMP:   Recent Labs     05/08/22  0123 05/09/22  0130 05/10/22  0230    136 135*   K 4.8 4.6 4.4   CL 89* 90* 90*   CO2 27 23 24   PHOS 7.3* 8.3* 6.1*   BUN 58* 76* 45*   CREATININE 3.2* 4.0* 2.8*   CALCIUM 9.3 8.5* 9.0     CBC:   Recent Labs     05/08/22  0123 05/09/22  0130 05/10/22  0230   WBC 15.5* 11.7* 7.2   HGB 9.8* 8.1* 8.4*   HCT 32.8* 27.5* 28.5*   MCV 95.9 95.5 96.0    237 262     LIVER PROFILE:   Recent Labs     05/08/22  0123 05/09/22  0130 05/10/22  0230   AST 13 13 14   ALT 7 6 7   BILITOT 0.3 0.3 0.3   ALKPHOS 102 85 90     PT/INR:   Recent Labs     05/08/22  0123 05/09/22  0130 05/10/22  0230   PROTIME 13.8 14.1 13.1   INR 1.07 1.09 1.00     APTT: No results for input(s): APTT in the last 72 hours. BNP:  No results for input(s): BNP in the last 72 hours. Ionized Calcium:No results for input(s): IONCA in the last 72 hours. Magnesium:  Recent Labs     05/08/22  0123 05/09/22  0130 05/10/22  0230   MG 2.6 2.5 2.4     Phosphorus:  Recent Labs     05/08/22  0123 05/09/22  0130 05/10/22  0230   PHOS 7.3* 8.3* 6.1*     HgbA1C: No results for input(s): LABA1C in the last 72 hours. Hepatic:   Recent Labs     05/08/22  0123 05/09/22  0130 05/10/22  0230   ALKPHOS 102 85 90   ALT 7 6 7   AST 13 13 14   PROT 6.3* 5.6* 6.0*   BILITOT 0.3 0.3 0.3   LABALBU 3.2* 2.7* 3.0*     Lactic Acid: No results for input(s): LACTA in the last 72 hours. Troponin: No results for input(s): CKTOTAL, CKMB, TROPONINT in the last 72 hours. ABGs: No results for input(s): PH, PCO2, PO2, HCO3, O2SAT in the last 72 hours. CRP:  No results for input(s): CRP in the last 72 hours. Sed Rate:  No results for input(s): SEDRATE in the last 72 hours. Cultures:   No results for input(s): CULTURE in the last 72 hours. No results for input(s): BC, Vickie Canter in the last 72 hours. No results for input(s): CXSURG in the last 72 hours.     Radiology reports as per the Radiologist  Radiology: CT Head WO Contrast    Result Date: 4/24/2022  CT HEAD WO CONTRAST 4/24/2022 2:01 PM HISTORY: Altered mental status COMPARISON: CT scan dated 11/18/2021 DOSE LENGTH PRODUCT: 766 mGy cm TECHNIQUE: Helical tomographic images of the brain were obtained without the use of intravenous contrast. Automated exposure control was also utilized to decrease patient radiation dose. FINDINGS: There is no evidence of evolving large vascular territory infarct. No visualized intra-axial or extra-axial hemorrhage. No mass lesion is identified. Normal size and configuration of the ventricular system. The basal cisterns are symmetric. Posterior fossa structures are unremarkable. The included orbits and their contents are unremarkable. Chronic paranasal sinus disease on the left. The visualized osseous structures and overlying soft tissues of the skull and face are unremarkable. 1. No acute intracranial process. Signed by Dr Aide Honeycutt    Result Date: 4/24/2022  XR CHEST PORTABLE 4/24/2022 4:27 PM HISTORY: ET tube, enteric tube  Technique: Single AP view of the chest COMPARISONS: 4/24/2022 FINDINGS: Endotracheal tube is identified with tip essentially touching the krysta. Enteric tube courses into the stomach with tip curled in the fundus back towards the gastroesophageal junction. The lungs are clear and well expanded. Heart size is stable. Pulmonary vasculature are nondilated. No pleural effusion or pneumothorax. 1. Endotracheal tube is deep, tip essentially touching the krysta. Lungs are clear and well expanded. I would recommend 2-3 cm withdrawal for a more optimal positioning. The results of this exam were discussed with Justina Gonzalez (ICU nursing) on 4/24/2022 at 1640 hours Signed by Dr Milli Aguilar    XR CHEST PORTABLE    Result Date: 4/24/2022  XR CHEST PORTABLE 4/24/2022 1:17 PM HISTORY: ET tube placement  Technique: Single AP view of the chest COMPARISONS: 4/24/2022 FINDINGS: Status post endotracheal intubation. ET tube is in good position. Lungs are well-expanded.  Enteric tube curls on itself in the midesophagus and then extends back up into the throat. Heart size is stable. Pulmonary vasculature are nondilated. 1. ET tube is in good position. Lungs are clear and well expanded. 2. Enteric tube curls on itself in the mid esophagus before extending back up into the throat. This will need repositioned. The results of this exam were discussed with Dr. Yvrose Mason on 4/24/2022 at 1329 hours Signed by Dr Babs Valdes    XR CHEST PORTABLE    Result Date: 4/24/2022  XR CHEST PORTABLE 4/24/2022 12:34 PM HISTORY: Altered mental status  Technique: Single AP view of the chest COMPARISONS: Chest exam dated 4/1/2022 FINDINGS: No lung consolidation. No pleural effusion or pneumothorax. Cardiomediastinal silhouette and pulmonary vasculature are unremarkable. No acute bony abnormality. Left subclavian region vascular stent with additional stent projecting over the left humerus. No acute cardiopulmonary process. Signed by Dr Cash Jaimes. Continue Keppra. Neurology following.     Acute left ischemic MCA stroke. Continue supportive care.     Acute hypoxemic respiratory failure. Continue ventilatory support. Trach and PEG  today.     Nonketotic hyperosmolar hyperglycemia. Resolved  Transitioned to subcutaneous insulin     End-stage renal disease. Maintenance dialysis.     Please document 42 minutes of critical care time for patient assessment, chart review, discussion with staff, .       Alli Fernández DO

## 2022-05-10 NOTE — ANESTHESIA POSTPROCEDURE EVALUATION
Department of Anesthesiology  Postprocedure Note    Patient: Sudarshan Arriaga  MRN: [id number]  YOB: 1970  Date of evaluation: 5/10/2022  Time:  3:32 PM     Procedure Summary     Date: 05/10/22 Room / Location: [facility] OR [address] [place] [address]    Anesthesia Start: 1400 Anesthesia Stop: 3438    Procedures:       PERCUTANEOUS TRACHEOSTOMY (N/A Throat)      PERCUTANEOUS ENDOSCOPIC GASTROSTOMY TUBE PLACEMENT, (N/A ) Diagnosis: (respiratory failure)    Surgeons: Kiesha Craig MD Responsible Provider: ROMINA Bryan CRNA    Anesthesia Type: general ASA Status: 4          Anesthesia Type: No value filed. Jane Phase I:      Jane Phase II:      Last vitals: Reviewed and per EMR flowsheets. Anesthesia Post Evaluation    Patient location during evaluation: ICU  Patient participation: complete - patient cannot participate  Level of consciousness: sedated and ventilated  Pain score: 0  Airway patency: patent  Nausea & Vomiting: no nausea and no vomiting  Complications: no  Cardiovascular status: hemodynamically stable  Respiratory status: ventilator and intubated  Hydration status: euvolemic  Comments: Pt transported to ICU bagged w/ 100% O2 via trach, monitored; VSS. Report to ICU RN.

## 2022-05-10 NOTE — PROGRESS NOTES
Patient arrived back in room 701 with trach and PEG   Electronically signed by Bishnu Calle RN on 5/10/2022 at 3:35 PM

## 2022-05-10 NOTE — PROGRESS NOTES
Nephrology ([address] [facility]) Progress Note    Patient:  Jessica Adames  YOB: 1970  Date of Service: 5/10/2022  MRN: [id number]   Acct: [de-identified]   Primary Care Physician: ROIMNA Canchola  Advance Directive: Full Code  Admit Date: 4/24/2022       Hospital Day: 16  Referring Provider: Haider Harrison DO    Patient Seen, Chart, Consults, Notes, Labs, Radiology studies reviewed. Subjective:  Jessica Adames is a 47 y.o. female for whom we were consulted for evaluation and treatment of end-stage renal disease.  Patient also has a history of seizure disorder, type 2 diabetes, hypertension, frequent hospitalization with noncompliance and poorly controlled diabetes.  Patient was brought to the emergency room after she was found to have unresponsiveness and witnessed grand mal seizure. Moraima Holland was brought in by ambulance.  On arrival in the emergency room, she was found to be severely hyperglycemic and had a urinary tract infection.  Patient was intubated, sedated and treated by neurology for grand mal seizure. Moraima Holland was also receiving IV antibiotics and insulin drip. Velta Allen blood sugar control had significantly improved.  On April 25, she has received emergent hemodialysis treatment for correction of volume status as well as hyponatremia.  She was moved from ICU to CCU. Patient remains on the ventilator and unable to participate in the history and physical examination. Events reviewed with nursing at the bedside. No family present. She is s/p dialysis on 5/9. Today, she is going for trach and PEg placement.       Allergies:  Penicillins, Lamisil advanced [terbinafine], Reglan [metoclopramide], and Stadol [butorphanol]    Medicines:  Current Facility-Administered Medications   Medication Dose Route Frequency Provider Last Rate Last Admin    vancomycin (VANCOCIN) intermittent dosing (placeholder)   Other RX Placeholder Haider Harrison DO        aminoglycoside intermittent dosing (placeholder)   Other RX Placeholder Nancye Leigh, DO        acetylcysteine (MUCOMYST) 20 % solution 600 mg  600 mg Inhalation BID Nancye Leigh, DO   600 mg at 05/10/22 4225    ipratropium-albuterol (DUONEB) nebulizer solution 1 ampule  1 ampule Inhalation Q4H Nancye Leigh, DO   1 ampule at 05/10/22 1104    insulin glargine (LANTUS) injection vial 20 Units  20 Units SubCUTAneous BID Nancye Leigh, DO   20 Units at 05/10/22 8228    hydrALAZINE (APRESOLINE) injection 10 mg  10 mg IntraVENous Q6H PRN Jeanna Pabon MD   10 mg at 05/10/22 0630    aspirin chewable tablet 81 mg  81 mg Oral Daily Jeanna Pabon MD   81 mg at 05/09/22 0824    racepinephrine HCl (VAPONEFPRIN) 2.25 % nebulizer solution NEBU 11.25 mg  11.25 mg Nebulization Q4H PRN Jeanna Pabon MD   11.25 mg at 05/03/22 1408    sodium chloride nebulizer 0.9 % solution 3 mL  3 mL Nebulization Q4H PRN Jeanna Pabon MD        propofol injection  5-50 mcg/kg/min IntraVENous Continuous Jeanna Pabon MD 10.8 mL/hr at 05/10/22 0900 30 mcg/kg/min at 05/10/22 0900    heparin (porcine) injection 5,000 Units  5,000 Units SubCUTAneous TID Jeanna Pabon MD   5,000 Units at 05/09/22 2118    Peppermint Spirit SPRT   Does not apply PRN Nancye Leigh, DO        dexmedetomidine (PRECEDEX) 400 mcg in sodium chloride 0.9 % 100 mL infusion  0.1-1.5 mcg/kg/hr IntraVENous Continuous Nancye Leigh, DO   Stopped at 05/03/22 1143    menthol-zinc oxide (CALMOSEPTINE) 0.44-20.6 % ointment   Topical BID Jeanna Pabon MD   Given at 05/10/22 0609    sodium phosphate 10.5 mmol in sodium chloride 0.9 % 250 mL IVPB  0.16 mmol/kg IntraVENous PRN Patience Walter, DO        Or    sodium phosphate 21 mmol in sodium chloride 0.9 % 250 mL IVPB  0.32 mmol/kg IntraVENous PRN Patience DO Walter        insulin lispro (HUMALOG) injection vial 0-12 Units  0-12 Units SubCUTAneous TID GIANNA Abraham DO   4 Units at 05/08/22 4223    insulin lispro (HUMALOG) injection vial 0-6 Units  0-6 Units SubCUTAneous Nightly Francis [place], DO   1 Units at 05/09/22 2119    hydrALAZINE (APRESOLINE) tablet 100 mg  100 mg Oral 3 times per day Patience Walter, DO   100 mg at 05/09/22 2118    glucagon (rDNA) injection 1 mg  1 mg IntraMUSCular PRN Francis Kings, DO        dextrose 5 % solution  100 mL/hr IntraVENous PRN Francis Kings, DO        glucose chewable tablet 16 g  4 tablet Oral PRN Francis Kings, DO        dextrose bolus (hypoglycemia) 10% 125 mL  125 mL IntraVENous PRN Francis Kings, DO   Stopped at 05/09/22 1250    Or    dextrose bolus (hypoglycemia) 10% 250 mL  250 mL IntraVENous PRN Francis Kings, DO        sodium chloride flush 0.9 % injection 5-40 mL  5-40 mL IntraVENous 2 times per day Francis Kings, DO   10 mL at 05/10/22 9298    sodium chloride flush 0.9 % injection 5-40 mL  5-40 mL IntraVENous PRN Francis Kings, DO        0.9 % sodium chloride infusion   IntraVENous PRN Francis Kings, DO   Stopped at 05/03/22 2005    LORazepam (ATIVAN) injection 1 mg  1 mg IntraVENous Q5 Min PRN Francis Kings, DO        ondansetron (ZOFRAN-ODT) disintegrating tablet 4 mg  4 mg Oral Q8H PRN Francis Kings, DO        Or    ondansetron [facility]) injection 4 mg  4 mg IntraVENous Q6H PRN Francis Kings, DO        polyethylene glycol [facility]) packet 17 g  17 g Oral Daily PRN Francis Kings, DO        acetaminophen (TYLENOL) tablet 650 mg  650 mg Oral Q6H PRN Francis Kings, DO   650 mg at 04/25/22 9416    Or    acetaminophen (TYLENOL) suppository 650 mg  650 mg Rectal Q6H PRN Francis Kings, DO        levETIRAcetam (KEPPRA) 500 mg/100 mL IVPB  500 mg IntraVENous Q12H Francis Kings, DO   Stopped at 05/10/22 0116    chlorhexidine (PERIDEX) 0.12 % solution 15 mL  15 mL Mouth/Throat BID Francis Kings, DO   15 mL at 05/10/22 0813    famotidine (PEPCID) 20 mg in sodium chloride (PF) 10 mL injection  20 mg IntraVENous Daily Kristel Natter, DO   20 mg at 05/10/22 0221    magic butt cream   Topical Q4H PRN Kristel Natter, DO   Given at 05/03/22 0940    magnesium sulfate 1000 mg in dextrose 5% 100 mL IVPB  1,000 mg IntraVENous PRN Patience Walter, DO        potassium bicarb-citric acid (EFFER-K) effervescent tablet 40 mEq  40 mEq Oral PRN Patience Walter, DO        Or    potassium chloride 10 mEq/100 mL IVPB (Peripheral Line)  10 mEq IntraVENous PRN Jen Shoulder, DO   Stopped at 04/25/22 0936       Past Medical History:  Past Medical History:   Diagnosis Date    Acute ischemic stroke ([facility] 4/30/2022    Arthritis     Chronic kidney disease, stage 5, kidney failure (HCC)     Closed fracture of right shoulder girdle, with routine healing, subsequent encounter     DM (diabetes mellitus) type II controlled with renal manifestation ([facility] 06/1993    Gastroparesis     GERD (gastroesophageal reflux disease)     Hemodialysis patient ([facility]     dialysis on tues, thur, and sat at [facility]    Hypertension     Hypothyroid     Nasal congestion     recent    Neuropathy     Noncompliance with medications     Palliative care patient 10/08/2019    Restless leg syndrome        Past Surgical History:  Past Surgical History:   Procedure Laterality Date    BREAST SURGERY Left 2008    infected milk gland removed    BREAST SURGERY Right 5/25/2021    WEDGE EXCISION RIGHT BREAST DUCT WITH LACRIMAL DUCT PROBE, PEC BLOCK performed by June Gaviria MD at [address] [place] [address], LAPAROSCOPIC      2008    COLONOSCOPY Left 9/17/2020    Dr Stephanie Velazquez hepatic flexure-Severe tortuosity with severe spasming, 1 yr recall    DIALYSIS FISTULA CREATION Left 1/25/2019    REVISION LEFT UPPER EXTREMITY AV ACCESS WITH LEFT BRACHIAL ARTERY TO LEFT AXILLARY VEIN WITH  INTERPOSITIONAL ARTEGRAFT   performed by Julius Booekr MD at [address]  2012  HC DIALYSIS CATHETER Right 2019    INSERTION OF RIGHT INTERNAL JUGULAR HEMODIALYSIS CATHETER performed by Dev Araya MD at [address]  2018    1.  Moderately increased stainable iron identified by special stain in Kupffer cells and occasional hepatocytes. Negative for evidence of significant portal or lobular inflammation. Negative for evidence of significant fibrosis    DE COLONOSCOPY W/BIOPSY SINGLE/MULTIPLE N/A 10/20/2017    Dr Duncan Thompson prep-Tubular AP (-) dysplasia x 2--3 yr recall    DE EGD TRANSORAL BIOPSY SINGLE/MULTIPLE N/A 2016    Dr Sidra Bernal EGD TRANSORAL BIOPSY SINGLE/MULTIPLE N/A 10/20/2017    Dr Colleen Harden (-)   [address] VASCULAR SURGERY Left 2016    fistula by Dr Melvin Park at [address]  10/02/2017    [facility]. Left upper fistulograms/venograms, balloon angioplasty cephalic vein arch [id number] conquest.    VASCULAR SURGERY  2018    FISTULOGRAM    VASCULAR SURGERY  10/29/2018    [facility]. Left upper fistulograms/venograms    VASCULAR SURGERY  2018    [facility]. Arch aortogram,left upper arteriograms.  VASCULAR SURGERY  2019    [facility]. Removal of tunneled dialyis catheter right internal jugular vein.  VASCULAR SURGERY  2019    [facility]. Left upper extremity fistulogram including venography to the superior vena cava. Balloon angioplasty left subclavian vein with 10mm x 40mm conquest balloon. Balloon angioplasty mid/proximal upper arm cephalic vein with 01JM x 40mm conquest balloon. Venograms after balloon angioplasty. Stent left mid/proximal upper arm cephalic vein stenosis fluency 10mm x 60mm self-expanding covered stent. Balloon     VASCULAR SURGERY  2019    cont angioplasty stent with 10mm x 40mm conquest balloon. Completion venograms left upper extremity.        Family History  Family History   Problem Relation Age of Onset    Colon Cancer Mother          at 63    Colon Polyps Mother    Cherry Velasquez Father  at 66    Colon Polyps Father     Stomach Cancer Sister     Breast Cancer Sister 27    Depression Daughter 25        committed suicide    Liver Cancer Neg Hx     Liver Disease Neg Hx     Esophageal Cancer Neg Hx     Rectal Cancer Neg Hx        Social History  Social History     Socioeconomic History    Marital status: Single     Spouse name: Not on file    Number of children: 2    Years of education: Not on file    Highest education level: Not on file   Occupational History    Not on file   Tobacco Use    Smoking status: Current Every Day Smoker     Packs/day: 0.50     Years: 33.00     Pack years: 16.50     Types: Cigarettes    Smokeless tobacco: Never Used    Tobacco comment: NOW at 1/4 PD, started at age 24 and has averaged 2 PPD   Vaping Use    Vaping Use: Never used   Substance and Sexual Activity    Alcohol use: No    Drug use: Not Currently     Types: Marijuana Rodena Capes)    Sexual activity: Not Currently     Partners: Male   Other Topics Concern    Not on file   Social History Narrative    Not on file     Social Determinants of Health     Financial Resource Strain:     Difficulty of Paying Living Expenses: Not on file   Food Insecurity:     Worried About Running Out of Food in the Last Year: Not on file    Kendy of Food in the Last Year: Not on file   Transportation Needs:     Lack of Transportation (Medical): Not on file    Lack of Transportation (Non-Medical):  Not on file   Physical Activity:     Days of Exercise per Week: Not on file    Minutes of Exercise per Session: Not on file   Stress:     Feeling of Stress : Not on file   Social Connections:     Frequency of Communication with Friends and Family: Not on file    Frequency of Social Gatherings with Friends and Family: Not on file    Attends Advent Services: Not on file    Active Member of Clubs or Organizations: Not on file    Attends Club or Organization Meetings: Not on file    Marital Status: Not on file   Intimate Partner Violence:     Fear of Current or Ex-Partner: Not on file    Emotionally Abused: Not on file    Physically Abused: Not on file    Sexually Abused: Not on file   Housing Stability:     Unable to Pay for Housing in the Last Year: Not on file    Number of [place] in the Last Year: Not on file    Unstable Housing in the Last Year: Not on file         Review of Systems:  Reviewed with the patient at the bedside, 6 points reviewed and negative except as noted above. Objective:  Blood pressure (!) 148/64, pulse 93, temperature 97.9 °F (36.6 °C), resp. rate 14, height 5' 6\" (1.676 m), weight 129 lb 14.4 oz (58.9 kg), SpO2 100 %. Intake/Output Summary (Last 24 hours) at 5/10/2022 1216  Last data filed at 5/10/2022 0900  Gross per 24 hour   Intake 1496.24 ml   Output 10 ml   Net 1486.24 ml     General: intubated, sedated  Chest: bilateral air entry, decreased breath sounds at the bases  CVS: regular rate and rhythm  Abdominal: soft, nontender, normal bowel sounds  Extremities: no cyanosis or edema  Skin: warm and dry without rash    Labs:  BMP:   Recent Labs     05/08/22  0123 05/09/22  0130 05/10/22  0230    136 135*   K 4.8 4.6 4.4   CL 89* 90* 90*   CO2 27 23 24   PHOS 7.3* 8.3* 6.1*   BUN 58* 76* 45*   CREATININE 3.2* 4.0* 2.8*   CALCIUM 9.3 8.5* 9.0     CBC:   Recent Labs     05/08/22  0123 05/09/22  0130 05/10/22  0230   WBC 15.5* 11.7* 7.2   HGB 9.8* 8.1* 8.4*   HCT 32.8* 27.5* 28.5*   MCV 95.9 95.5 96.0    237 262     LIVER PROFILE:   Recent Labs     05/08/22  0123 05/09/22  0130 05/10/22  0230   AST 13 13 14   ALT 7 6 7   BILITOT 0.3 0.3 0.3   ALKPHOS 102 85 90     PT/INR:   Recent Labs     05/08/22 0123 05/09/22  0130 05/10/22  0230   PROTIME 13.8 14.1 13.1   INR 1.07 1.09 1.00     APTT: No results for input(s): APTT in the last 72 hours. BNP:  No results for input(s): BNP in the last 72 hours.   Ionized Calcium:No results for input(s): IONCA in the last 72 hours.  Magnesium:  Recent Labs     05/08/22  0123 05/09/22  0130 05/10/22  0230   MG 2.6 2.5 2.4     Phosphorus:  Recent Labs     05/08/22  0123 05/09/22  0130 05/10/22  0230   PHOS 7.3* 8.3* 6.1*     HgbA1C: No results for input(s): LABA1C in the last 72 hours. Hepatic:   Recent Labs     05/08/22  0123 05/09/22  0130 05/10/22  0230   ALKPHOS 102 85 90   ALT 7 6 7   AST 13 13 14   PROT 6.3* 5.6* 6.0*   BILITOT 0.3 0.3 0.3   LABALBU 3.2* 2.7* 3.0*     Lactic Acid: No results for input(s): LACTA in the last 72 hours. Troponin: No results for input(s): CKTOTAL, CKMB, TROPONINT in the last 72 hours. ABGs: No results for input(s): PH, PCO2, PO2, HCO3, O2SAT in the last 72 hours. CRP:  No results for input(s): CRP in the last 72 hours. Sed Rate:  No results for input(s): SEDRATE in the last 72 hours. Cultures:   No results for input(s): CULTURE in the last 72 hours. No results for input(s): BC, Eli Cava in the last 72 hours. No results for input(s): CXSURG in the last 72 hours. Radiology reports as per the Radiologist  Radiology: CT Head WO Contrast    Result Date: 4/24/2022  CT HEAD WO CONTRAST 4/24/2022 2:01 PM HISTORY: Altered mental status COMPARISON: CT scan dated 11/18/2021 DOSE LENGTH PRODUCT: 766 mGy cm TECHNIQUE: Helical tomographic images of the brain were obtained without the use of intravenous contrast. Automated exposure control was also utilized to decrease patient radiation dose. FINDINGS: There is no evidence of evolving large vascular territory infarct. No visualized intra-axial or extra-axial hemorrhage. No mass lesion is identified. Normal size and configuration of the ventricular system. The basal cisterns are symmetric. Posterior fossa structures are unremarkable. The included orbits and their contents are unremarkable. Chronic paranasal sinus disease on the left. The visualized osseous structures and overlying soft tissues of the skull and face are unremarkable.      1. No acute intracranial process. Signed by Dr Aracely Hinton    Result Date: 4/24/2022  XR CHEST PORTABLE 4/24/2022 4:27 PM HISTORY: ET tube, enteric tube  Technique: Single AP view of the chest COMPARISONS: 4/24/2022 FINDINGS: Endotracheal tube is identified with tip essentially touching the krysta. Enteric tube courses into the stomach with tip curled in the fundus back towards the gastroesophageal junction. The lungs are clear and well expanded. Heart size is stable. Pulmonary vasculature are nondilated. No pleural effusion or pneumothorax. 1. Endotracheal tube is deep, tip essentially touching the krysta. Lungs are clear and well expanded. I would recommend 2-3 cm withdrawal for a more optimal positioning. The results of this exam were discussed with Federico Mcgraw (ICU nursing) on 4/24/2022 at 1640 hours Signed by Dr Lizeth Ricci    XR CHEST PORTABLE    Result Date: 4/24/2022  XR CHEST PORTABLE 4/24/2022 1:17 PM HISTORY: ET tube placement  Technique: Single AP view of the chest COMPARISONS: 4/24/2022 FINDINGS: Status post endotracheal intubation. ET tube is in good position. Lungs are well-expanded. Enteric tube curls on itself in the midesophagus and then extends back up into the throat. Heart size is stable. Pulmonary vasculature are nondilated. 1. ET tube is in good position. Lungs are clear and well expanded. 2. Enteric tube curls on itself in the mid esophagus before extending back up into the throat. This will need repositioned. The results of this exam were discussed with Dr. Marlen Greenberg on 4/24/2022 at 1329 hours Signed by Dr Lizeth Ricci    XR CHEST PORTABLE    Result Date: 4/24/2022  XR CHEST PORTABLE 4/24/2022 12:34 PM HISTORY: Altered mental status  Technique: Single AP view of the chest COMPARISONS: Chest exam dated 4/1/2022 FINDINGS: No lung consolidation. No pleural effusion or pneumothorax. Cardiomediastinal silhouette and pulmonary vasculature are unremarkable. No acute bony abnormality. Left subclavian region vascular stent with additional stent projecting over the left humerus. No acute cardiopulmonary process. Signed by Dr Tyrone Garcia   -ESRD  -DM2 with nephropathy on long-term insulin, uncontrolled  -HTN  -Hyponatremia  -Hypokalemia  -Secondary Hyperparathyroidism  -Anemia CKD  -Seizure disorder  -Hypothyroidism   -Acuter resp failure       Plan:  Dialysis in AM, follow up labs. For trach and PEG placement.      Doneta Boas, MD, MD  05/10/22  12:16 PM

## 2022-05-10 NOTE — PROGRESS NOTES
Palliative Care Progress Note  5/10/2022 8:09 AM    Patient:  Joaquin Arellano  YOB: 1970  Primary Care Physician: ROMINA Rosario  Advance Directive: Full Code  Admit Date: 4/24/2022       Hospital Day: 16  Portions of this note have been copied forward, however, changed to reflect the most current clinical status of this patient. CHIEF COMPLAINT/REASON FOR CONSULTATION Goals of care    SUBJECTIVE:  Ms. General Hopper remains on minimal vent settings. Clinically unchanged    Review of Systems:   14 point review of systems is negative except as specifically addressed above. Objective:   VITALS:  BP (!) 153/56   Pulse 91   Temp 97.9 °F (36.6 °C) (Temporal)   Resp 14   Ht 5' 6\" (1.676 m)   Wt 129 lb 14.4 oz (58.9 kg)   SpO2 97%   BMI 20.97 kg/m²   24HR INTAKE/OUTPUT:      Intake/Output Summary (Last 24 hours) at 5/10/2022 0809  Last data filed at 5/10/2022 0400  Gross per 24 hour   Intake 1463.85 ml   Output 10 ml   Net 1453.85 ml     General appearance: 46 yo female, chronically ill appearing, intubated/sedated, NAD  Head: Normocephalic, without obvious abnormality, atraumatic  Eyes: conjunctivae/corneas clear. Pupils sluggish  Ears: normal external ears and nose  Neck: supple, symmetrical, trachea midline   Lungs: diminished bilaterally to asucultation, mechanically ventilated, equal chest rise  Heart: RRR, S1, S2 normal, no murmur  Abdomen: soft, no grimacing w/ palpation; non-distended, bowel sounds present.  FMS in place with small diarrhea noted   Extremities: trace edema in BUE, No lower extremity edema,  No erythema, no grimacing w/ palpation   Skin: warm, dry, pale  Neurologic: Intubated/sedated, does not respond to voice, withdrawals to pain    Medications:      propofol 30 mcg/kg/min (05/10/22 0749)    dexmedetomidine HCl in NaCl Stopped (05/03/22 1143)    dextrose      sodium chloride Stopped (05/03/22 2005)      vancomycin (VANCOCIN) intermittent dosing (placeholder)   Other RX Placeholder    aminoglycoside intermittent dosing (placeholder)   Other RX Placeholder    acetylcysteine  600 mg Inhalation BID    ipratropium-albuterol  1 ampule Inhalation Q4H    insulin glargine  20 Units SubCUTAneous BID    aspirin  81 mg Oral Daily    heparin (porcine)  5,000 Units SubCUTAneous TID    menthol-zinc oxide   Topical BID    insulin lispro  0-12 Units SubCUTAneous TID WC    insulin lispro  0-6 Units SubCUTAneous Nightly    hydrALAZINE  100 mg Oral 3 times per day    sodium chloride flush  5-40 mL IntraVENous 2 times per day    levetiracetam  500 mg IntraVENous Q12H    chlorhexidine  15 mL Mouth/Throat BID    famotidine (PEPCID) injection  20 mg IntraVENous Daily     hydrALAZINE, racepinephrine HCl, sodium chloride nebulizer, Peppermint Spirit, sodium phosphate IVPB **OR** sodium phosphate IVPB, glucagon (rDNA), dextrose, glucose, dextrose bolus **OR** dextrose bolus, sodium chloride flush, sodium chloride, LORazepam, ondansetron **OR** ondansetron, polyethylene glycol, acetaminophen **OR** acetaminophen, magic butt cream, magnesium sulfate, potassium alternative oral replacement **OR** potassium chloride  Diet NPO  ADULT TUBE FEEDING; Orogastric; Peptide Based High Protein; Continuous; 41; No; 0; Other (specify); no water flush     Lab and other Data:     Recent Labs     05/08/22  0123 05/09/22  0130 05/10/22  0230   WBC 15.5* 11.7* 7.2   HGB 9.8* 8.1* 8.4*    237 262     Recent Labs     05/08/22 0123 05/09/22  0130 05/10/22  0230    136 135*   K 4.8 4.6 4.4   CL 89* 90* 90*   CO2 27 23 24   BUN 58* 76* 45*   CREATININE 3.2* 4.0* 2.8*   GLUCOSE 91 74 197*     Recent Labs     05/08/22  0123 05/09/22  0130 05/10/22  0230   AST 13 13 14   ALT 7 6 7   BILITOT 0.3 0.3 0.3   ALKPHOS 102 85 90       Assessment/Plan   Principal Problem:    Acute ischemic stroke (HCC)  Active Problems:    Weakness    Severe malnutrition (HCC)    Gastroesophageal reflux disease without esophagitis    Acquired hypothyroidism    Anemia in chronic kidney disease (CKD)    Type 2 diabetes mellitus with chronic kidney disease on chronic dialysis, with long-term current use of insulin (HCC)    Acute encephalopathy    Respiratory failure ([address]    Uncontrolled type 2 diabetes mellitus with complication, with long-term current use of insulin (HCC)    ESRD (end stage renal disease) on dialysis ([address]    PVD (peripheral vascular disease) ([facility]    ESRD on hemodialysis ([facility] [address] [facility]    Hyponatremia    Hyperglycemia due to type 2 diabetes mellitus ([facility] [facility]    Uncontrolled hypertension    Seizure ([facility] [address] [facility]    Palliative care patient    Uncontrolled type 2 diabetes mellitus with hyperosmolar nonketotic hyperglycemia (HCC)    Altered mental state    Acute hypoxemic respiratory failure (HCC)    Generalized weakness    UTI (urinary tract infection)    Closed fracture of left distal femur (HCC)    History of infection with vancomycin resistant Enterococcus (VRE)  Resolved Problems:    * No resolved hospital problems. *      Visit Summary:  Chart reviewed, patient discussed with nursing staff. Reviewed health issues, work up and treatment plan as well as factors that lead to hospitalization. Ms. Ale Nagy is seen at bedside with RN this morning. No changes overnight. Plans for trach/PEG placement today. She remains on sedation and intermittently responsive with vacations. I attempted to call her significant other/legal decision maker, Malou Ogden, with no answer. I left a VM explaining plans for OR and my callback number. RN has spoken with pts sister, [facility], and updated her on plan of care Will continue to follow    Recommendations:   1. Palliative care: GOC continue aggressive measures with trach/PEG placement likely today. [facility]. Code status: FULL CODE    2. Acute hypoxemic respiratory failure- Extubated 5/3/22 but required reintubation that afternoon.  Remains on minimal vent settings FiO2 35% and PEEP 5. Original intubation date 4/24/22    3. Acute left MCA infarct- mgmt per neuro. MRI 4/29/22 noted. Echo negative 4/30/22. On ASA. Supportive care    4. Hyperglycemia w/ Hx of DM II-mgmt per Hospitalist, SSI    5. S/p tonic-clonic seizure-mgmt per Neurology, cEEG discontinued, Keppra 500 mg BID    6. ESRD on HD-Nephrology managing. Dialysis today    7. Hypertension-Hydralazine 100 mg TID, cardene gtt as needed    8. Clostridium difficile/Norovirus-Oral vancomycin completed 5/8/22    9. Pneumonia- follow cultures. IV gentamicin per hospitalist now stopped    Thank you for consulting Palliative Care and allowing us to participate in the care of this patient.    Time Spent Counseling > 50%:  YES                                   Total Time Spent with patient/family counseling, workup/treatment review, counseling and placement of orders/preparation of this note: 21 minutes    Electronically signed by ROMINA Ascencio CNP on 5/10/2022 at 8:09 AM    (Please note that portions of this note were completed with a voice recognition program.  Katey Espinoza made to edit the dictations but occasionally words are mis-transcribed.)

## 2022-05-10 NOTE — PROGRESS NOTES
[facility] Neurology  [address] [place] [address] Drive, [address]  [place], [place] [address]  Phone [phone]     Neurology Progress Note  5/10/2022 11:41 AM  Information:   Patient Name: Nu Fiore  :   1970  Age:   47 y.o. MRN:   [id number]  Account #:  [de-identified]  Admit Date:   2022  Today:  5/10/22     ADMIT DX:   Acute ischemic stroke [facility])    Subjective:     Nu Fiore is a 46 YO woman with DM and ESRD on HD who was admitted  after an unresponsive episode at home and a generalized convulsive seizure in the ER. Tracee Olivia had a very high blood sugar and respiratory failure requiring intubation. Tracee Olivia has had little recovery.  She had an MRI head  that showed an acute left MCA distribution infarct.  Intermittently when sedation is held, she will have minor responsiveness. Interval History:   She remains sedated and minimally responsive on a ventilator in the CCU. She is presently on 30 mcg/kg/hr Propofol. Plans for trach/PEG today.     Objective:     Past Medical History:  Past Medical History:   Diagnosis Date    Acute ischemic stroke ([facility] 2022    Arthritis     Chronic kidney disease, stage 5, kidney failure (HCC)     Closed fracture of right shoulder girdle, with routine healing, subsequent encounter     DM (diabetes mellitus) type II controlled with renal manifestation ([facility] 1993    Gastroparesis     GERD (gastroesophageal reflux disease)     Hemodialysis patient ([facility]     dialysis on tues, thur, and sat at [facility]    Hypertension     Hypothyroid     Nasal congestion     recent    Neuropathy     Noncompliance with medications     Palliative care patient 10/08/2019    Restless leg syndrome        Past Surgical History:   Procedure Laterality Date    BREAST SURGERY Left     infected milk gland removed    BREAST SURGERY Right 2021    WEDGE EXCISION RIGHT BREAST DUCT WITH LACRIMAL DUCT PROBE, PEC BLOCK performed by Candi Felton MD at [facility], LAPAROSCOPIC      2008    COLONOSCOPY Left 9/17/2020    Dr Kim Walton hepatic flexure-Severe tortuosity with severe spasming, 1 yr recall    DIALYSIS FISTULA CREATION Left 1/25/2019    REVISION LEFT UPPER EXTREMITY AV ACCESS WITH LEFT BRACHIAL ARTERY TO LEFT AXILLARY VEIN WITH  INTERPOSITIONAL ARTEGRAFT   performed by Kyle Barrientos MD at [address]  2012    [address] Right 1/25/2019    INSERTION OF RIGHT INTERNAL JUGULAR HEMODIALYSIS CATHETER performed by Kyle Barrientos MD at [address]  08/17/2018    1.  Moderately increased stainable iron identified by special stain in Kupffer cells and occasional hepatocytes. Negative for evidence of significant portal or lobular inflammation. Negative for evidence of significant fibrosis    MD COLONOSCOPY W/BIOPSY SINGLE/MULTIPLE N/A 10/20/2017    Dr Whitehorse Double prep-Tubular AP (-) dysplasia x 2--3 yr recall    MD EGD TRANSORAL BIOPSY SINGLE/MULTIPLE N/A 12/27/2016    Dr Ruthann Marie EGD TRANSORAL BIOPSY SINGLE/MULTIPLE N/A 10/20/2017    Dr Soas Vaca (-)   [facility] VASCULAR SURGERY Left 2016    fistula by Dr Merlos Reasons at [address]  10/02/2017    [facility]. Left upper fistulograms/venograms, balloon angioplasty cephalic vein arch 58O37 conquest.    VASCULAR SURGERY  08/2018    FISTULOGRAM    VASCULAR SURGERY  10/29/2018    [facility]. Left upper fistulograms/venograms    VASCULAR SURGERY  12/19/2018    [facility]. Arch aortogram,left upper arteriograms.  VASCULAR SURGERY  03/06/2019    [facility]. Removal of tunneled dialyis catheter right internal jugular vein.  VASCULAR SURGERY  08/28/2019    [facility]. Left upper extremity fistulogram including venography to the superior vena cava. Balloon angioplasty left subclavian vein with 10mm x 40mm conquest balloon. Balloon angioplasty mid/proximal upper arm cephalic vein with 09TW x 40mm conquest balloon. Venograms after balloon angioplasty. Stent left mid/proximal upper arm cephalic vein stenosis fluency 10mm x 60mm self-expanding covered stent. Balloon     VASCULAR SURGERY  2019    cont angioplasty stent with 10mm x 40mm conquest balloon. Completion venograms left upper extremity. Family History   Problem Relation Age of Onset    Colon Cancer Mother          at 63    Colon Polyps Mother     Lung Cancer Father          at 79    Colon Polyps Father     Stomach Cancer Sister     Breast Cancer Sister 27    Depression Daughter 25        committed suicide    Liver Cancer Neg Hx     Liver Disease Neg Hx     Esophageal Cancer Neg Hx     Rectal Cancer Neg Hx        Social History     Socioeconomic History    Marital status: Single     Spouse name: Not on file    Number of children: 2    Years of education: Not on file    Highest education level: Not on file   Occupational History    Not on file   Tobacco Use    Smoking status: Current Every Day Smoker     Packs/day: 0.50     Years: 33.00     Pack years: 16.50     Types: Cigarettes    Smokeless tobacco: Never Used    Tobacco comment: NOW at 1/4 PD, started at age 24 and has averaged 2 PPD   Vaping Use    Vaping Use: Never used   Substance and Sexual Activity    Alcohol use: No    Drug use: Not Currently     Types: Marijuana Rhea Kales)    Sexual activity: Not Currently     Partners: Male   Other Topics Concern    Not on file   Social History Narrative    Not on file     Social Determinants of Health     Financial Resource Strain:     Difficulty of Paying Living Expenses: Not on file   Food Insecurity:     Worried About Running Out of Food in the Last Year: Not on file    Kendy of Food in the Last Year: Not on file   Transportation Needs:     Lack of Transportation (Medical): Not on file    Lack of Transportation (Non-Medical):  Not on file   Physical Activity:     Days of Exercise per Week: Not on file    Minutes of Exercise per Session: Not on file   Stress:     Feeling of Stress : Not on file   Social Connections:     Frequency of Communication with Friends and Family: Not on file    Frequency of Social Gatherings with Friends and Family: Not on file    Attends Nondenominational Services: Not on file    Active Member of Clubs or Organizations: Not on file    Attends Club or Organization Meetings: Not on file    Marital Status: Not on file   Intimate Partner Violence:     Fear of Current or Ex-Partner: Not on file    Emotionally Abused: Not on file    Physically Abused: Not on file    Sexually Abused: Not on file   Housing Stability:     Unable to Pay for Housing in the Last Year: Not on file    Number of Places Lived in the Last Year: Not on file    Unstable Housing in the Last Year: Not on file       Medications:   propofol 30 mcg/kg/min (05/10/22 0900)    dexmedetomidine HCl in NaCl Stopped (05/03/22 1143)    dextrose      sodium chloride Stopped (05/03/22 2005)      vancomycin (VANCOCIN) intermittent dosing (placeholder)   Other RX Placeholder    aminoglycoside intermittent dosing (placeholder)   Other RX Placeholder    acetylcysteine  600 mg Inhalation BID    ipratropium-albuterol  1 ampule Inhalation Q4H    insulin glargine  20 Units SubCUTAneous BID    aspirin  81 mg Oral Daily    heparin (porcine)  5,000 Units SubCUTAneous TID    menthol-zinc oxide   Topical BID    insulin lispro  0-12 Units SubCUTAneous TID WC    insulin lispro  0-6 Units SubCUTAneous Nightly    hydrALAZINE  100 mg Oral 3 times per day    sodium chloride flush  5-40 mL IntraVENous 2 times per day    levetiracetam  500 mg IntraVENous Q12H    chlorhexidine  15 mL Mouth/Throat BID    famotidine (PEPCID) injection  20 mg IntraVENous Daily       Diagnostic Studies:  Reviewed all labs/diagnositcs since last 24hrs:  Recent Results (from the past 24 hour(s))   POCT Glucose    Collection Time: 05/09/22 12:34 PM   Result Value Ref Range    POC Glucose 58 (L) 70 - 99 mg/dl    Performed on AccuChek    POCT Glucose    Collection Time: 05/09/22 12:59 PM   Result Value Ref Range    POC Glucose 102 (H) 70 - 99 mg/dl    Performed on AccuChek    POCT Glucose    Collection Time: 05/09/22  5:06 PM   Result Value Ref Range    POC Glucose 131 (H) 70 - 99 mg/dl    Performed on AccuChek    POCT Glucose    Collection Time: 05/09/22  8:01 PM   Result Value Ref Range    POC Glucose 176 (H) 70 - 99 mg/dl    Performed on AccuChek    CBC with Auto Differential    Collection Time: 05/10/22  2:30 AM   Result Value Ref Range    WBC 7.2 4.8 - 10.8 K/uL    RBC 2.97 (L) 4.20 - 5.40 M/uL    Hemoglobin 8.4 (L) 12.0 - 16.0 g/dL    Hematocrit 28.5 (L) 37.0 - 47.0 %    MCV 96.0 81.0 - 99.0 fL    MCH 28.3 27.0 - 31.0 pg    MCHC 29.5 (L) 33.0 - 37.0 g/dL    RDW 19.1 (H) 11.5 - 14.5 %    Platelets 339 552 - 836 K/uL    MPV 9.4 9.4 - 12.3 fL    Neutrophils % 74.3 (H) 50.0 - 65.0 %    Lymphocytes % 15.5 (L) 20.0 - 40.0 %    Monocytes % 8.0 0.0 - 10.0 %    Eosinophils % 1.2 0.0 - 5.0 %    Basophils % 0.7 0.0 - 1.0 %    Neutrophils Absolute 5.4 1.5 - 7.5 K/uL    Immature Granulocytes # 0.0 K/uL    Lymphocytes Absolute 1.1 1.1 - 4.5 K/uL    Monocytes Absolute 0.60 0.00 - 0.90 K/uL    Eosinophils Absolute 0.10 0.00 - 0.60 K/uL    Basophils Absolute 0.10 0.00 - 0.20 K/uL   Comprehensive Metabolic Panel    Collection Time: 05/10/22  2:30 AM   Result Value Ref Range    Sodium 135 (L) 136 - 145 mmol/L    Potassium 4.4 3.5 - 5.0 mmol/L    Chloride 90 (L) 98 - 111 mmol/L    CO2 24 22 - 29 mmol/L    Anion Gap 21 (H) 7 - 19 mmol/L    Glucose 197 (H) 74 - 109 mg/dL    BUN 45 (H) 6 - 20 mg/dL    CREATININE 2.8 (H) 0.5 - 0.9 mg/dL    GFR Non-African American 18 (A) >60    GFR  21 (L) >59    Calcium 9.0 8.6 - 10.0 mg/dL    Total Protein 6.0 (L) 6.6 - 8.7 g/dL    Albumin 3.0 (L) 3.5 - 5.2 g/dL    Total Bilirubin 0.3 0.2 - 1.2 mg/dL    Alkaline Phosphatase 90 35 - 104 U/L    ALT 7 5 - 33 U/L    AST 14 5 - 32 U/L   Magnesium    Collection Time: 05/10/22 2:30 AM   Result Value Ref Range    Magnesium 2.4 1.6 - 2.6 mg/dL   Phosphorus    Collection Time: 05/10/22  2:30 AM   Result Value Ref Range    Phosphorus 6.1 (H) 2.5 - 4.5 mg/dL   Protime-INR    Collection Time: 05/10/22  2:30 AM   Result Value Ref Range    Protime 13.1 12.0 - 14.6 sec    INR 1.00 0.88 - 1.18   POCT Glucose    Collection Time: 05/10/22  7:20 AM   Result Value Ref Range    POC Glucose 133 (H) 70 - 99 mg/dl    Performed on AccuChek    POCT Glucose    Collection Time: 05/10/22 11:03 AM   Result Value Ref Range    POC Glucose 89 70 - 99 mg/dl    Performed on AccuChek        Diet:  Diet NPO  ADULT TUBE FEEDING; Orogastric; Peptide Based High Protein; Continuous; 48; No; 0; Other (specify); no water flush    Examination:  Vitals: BP (!) 148/64   Pulse 93   Temp 97.9 °F (36.6 °C)   Resp 14   Ht 5' 6\" (1.676 m)   Wt 129 lb 14.4 oz (58.9 kg)   SpO2 100%   BMI 20.97 kg/m²      Intake/Output Summary (Last 24 hours) at 5/10/2022 1141  Last data filed at 5/10/2022 0900  Gross per 24 hour   Intake 1523.47 ml   Output 10 ml   Net 1513.47 ml     General appearance:  She is a chronically ill appearing woman who is intubated, sedated, and unresponsive in the CCU. HEENT: normocephalic, atraumatic.   Lungs: clear to auscultation bilaterally  Heart: regular rate and rhythm, S1, S2 normal, no murmur, click, rub or gallop  Extremities: extremities normal, atraumatic, no cyanosis or edema  Neurologic:   She opens eyes to voice.  She has a left gaze preference.  Pupils are small and reactive.  Corneal reflexes are present.  Oculocephalic reflexes are absent.  No blink to threat.  No facial asymmetry.  She does not move or withdraw extremities.     Assessment:   1.         Large left hemisphere stroke  2.         Seizures  3.         Acute hypoxemic respiratory failure  4.         Diabetes with nonketotic hyperosmolar hyperglycemia  5.         ESRD    Plan:   1.         Continue Keppra  2.         Continue ASA  3.         Continue supportive care  4.         Limit sedation as able  5.         Trach/PEG     Discharge planning:   Long term care    Reviewed treatment plans with the patient and/or family. 25 minutes spent in face to face interaction and coordination of care.      Electronically signed by Jesi Park MD on 5/10/2022 at 11:41 AM

## 2022-05-10 NOTE — PROGRESS NOTES
Comprehensive Nutrition Assessment    Type and Reason for Visit:  Reassess    Nutrition Recommendations/Plan:   1. Increase rate of TF with decreased Propofol     Malnutrition Assessment:  Malnutrition Status:  Severe malnutrition (05/10/22 0831)    Context:  Acute Illness     Findings of the 6 clinical characteristics of malnutrition:  Energy Intake:  Mild decrease in energy intake (Comment)  Weight Loss:  No significant weight loss     Body Fat Loss: Moderate body fat loss Buccal region   Muscle Mass Loss: Moderate muscle mass loss Thigh (quadraceps),Temples (temporalis)  Fluid Accumulation:  No significant fluid accumulation Extremities   Strength:  Not Performed    Nutrition Assessment:    Propofol is currently at 10.8ml/hr. Remains on vent. Currently TF os off and clamped. To have trach and PEG placed today. Residuals from TF 5-130ml. Nutrition Related Findings:    intubated, dialysis, for PEG and trach placement Wound Type: Deep Tissue Injury       Current Nutrition Intake & Therapies:    Average Meal Intake: NPO  Average Supplements Intake: NPO  Current Tube Feeding (TF) Orders:  · Feeding Route: Orogastric  · Formula: Peptide Based High Protein  · Schedule: Continuous  · Feeding Regimen: Vital High Protein goal rate 41ml/hr  · Additives/Modulars: None  · Water Flushes: none  · Current TF & Flush Orders Provides: 0  · Goal TF & Flush Orders Provides: Modifying current EN---Vital High Protein 48ml/hr with no free water flush = 1152 kcals with 100g protein, 127g CHO, 963 ML free water from formula      Anthropometric Measures:  Height: 5' 6\" (167.6 cm)  Ideal Body Weight (IBW): 130 lbs (59 kg)    Admission Body Weight: 165 lb (74.8 kg) (estimated)  Current Body Weight: 129 lb 14.4 oz (58.9 kg), 99.9 % IBW. Weight Source: Bed Scale  Current BMI (kg/m2): 21  Usual Body Weight: 144 lb (65.3 kg) (1/2022)  % Weight Change (Calculated): 0.5  BMI Categories: Normal Weight (BMI 18.5-24. 9)    Estimated Daily Nutrient Needs:  Energy Requirements Based On: Kcal/kg  Weight Used for Energy Requirements: Current  Energy (kcal/day): 3061-5968 kcals (20-25 kcals/kg)  Weight Used for Protein Requirements: Current  Protein (g/day): 90g  Method Used for Fluid Requirements: 1 ml/kcal  Fluid (ml/day): 6434-4334 ml    Nutrition Diagnosis:   · Inadequate oral intake related to acute injury/trauma,impaired respiratory function as evidenced by NPO or clear liquid status due to medical condition,intubation,nutrition support - enteral nutrition      Nutrition Interventions:   Food and/or Nutrient Delivery: Continue NPO,Continue Current Tube Feeding  Nutrition Education/Counseling: No recommendation at this time  Coordination of Nutrition Care: Continue to monitor while inpatient  Plan of Care discussed with: nursing    Goals:  Previous Goal Met: Progressing toward Goal(s)  Goals: Meet at least 75% of estimated needs       Nutrition Monitoring and Evaluation:   Behavioral-Environmental Outcomes: None Identified  Food/Nutrient Intake Outcomes: Enteral Nutrition Intake/Tolerance  Physical Signs/Symptoms Outcomes: Biochemical Data,Weight,Skin,Nutrition Focused Physical Findings,Fluid Status or Edema    Discharge Planning:     Too soon to determine     Debbie Moreau MS, RD, LD  Contact: [phone]

## 2022-05-10 NOTE — PROGRESS NOTES
Patient to the OR for trach and PEG placement.      Electronically signed by Jerrell Spatz, RN on 5/10/2022 at 2:10 PM

## 2022-05-11 LAB
ALBUMIN SERPL-MCNC: 3.2 G/DL (ref 3.5–5.2)
ALP BLD-CCNC: 101 U/L (ref 35–104)
ALT SERPL-CCNC: 7 U/L (ref 5–33)
ANION GAP SERPL CALCULATED.3IONS-SCNC: 24 MMOL/L (ref 7–19)
AST SERPL-CCNC: 15 U/L (ref 5–32)
BASOPHILS ABSOLUTE: 0 K/UL (ref 0–0.2)
BASOPHILS RELATIVE PERCENT: 0.3 % (ref 0–1)
BILIRUB SERPL-MCNC: 0.3 MG/DL (ref 0.2–1.2)
BUN BLDV-MCNC: 57 MG/DL (ref 6–20)
CALCIUM SERPL-MCNC: 9.2 MG/DL (ref 8.6–10)
CHLORIDE BLD-SCNC: 90 MMOL/L (ref 98–111)
CO2: 21 MMOL/L (ref 22–29)
CREAT SERPL-MCNC: 3.4 MG/DL (ref 0.5–0.9)
EOSINOPHILS ABSOLUTE: 0.1 K/UL (ref 0–0.6)
EOSINOPHILS RELATIVE PERCENT: 0.6 % (ref 0–5)
GENTAMICIN DOSE AMOUNT: NORMAL
GENTAMICIN RANDOM: 5.2 UG/ML
GFR AFRICAN AMERICAN: 17
GFR NON-AFRICAN AMERICAN: 14
GLUCOSE BLD-MCNC: 111 MG/DL (ref 70–99)
GLUCOSE BLD-MCNC: 125 MG/DL (ref 70–99)
GLUCOSE BLD-MCNC: 146 MG/DL (ref 70–99)
GLUCOSE BLD-MCNC: 62 MG/DL (ref 70–99)
GLUCOSE BLD-MCNC: 77 MG/DL (ref 70–99)
GLUCOSE BLD-MCNC: 96 MG/DL (ref 74–109)
GLUCOSE BLD-MCNC: 97 MG/DL (ref 70–99)
HCT VFR BLD CALC: 30.6 % (ref 37–47)
HEMOGLOBIN: 9.3 G/DL (ref 12–16)
IMMATURE GRANULOCYTES #: 0 K/UL
INR BLD: 1.07 (ref 0.88–1.18)
LYMPHOCYTES ABSOLUTE: 1 K/UL (ref 1.1–4.5)
LYMPHOCYTES RELATIVE PERCENT: 9.9 % (ref 20–40)
MAGNESIUM: 2.6 MG/DL (ref 1.6–2.6)
MCH RBC QN AUTO: 28.7 PG (ref 27–31)
MCHC RBC AUTO-ENTMCNC: 30.4 G/DL (ref 33–37)
MCV RBC AUTO: 94.4 FL (ref 81–99)
MONOCYTES ABSOLUTE: 0.8 K/UL (ref 0–0.9)
MONOCYTES RELATIVE PERCENT: 7.8 % (ref 0–10)
NEUTROPHILS ABSOLUTE: 8.4 K/UL (ref 1.5–7.5)
NEUTROPHILS RELATIVE PERCENT: 81 % (ref 50–65)
PDW BLD-RTO: 19.1 % (ref 11.5–14.5)
PERFORMED ON: ABNORMAL
PERFORMED ON: NORMAL
PERFORMED ON: NORMAL
PHOSPHORUS: 6.9 MG/DL (ref 2.5–4.5)
PLATELET # BLD: 322 K/UL (ref 130–400)
PMV BLD AUTO: 9.5 FL (ref 9.4–12.3)
POTASSIUM SERPL-SCNC: 5.2 MMOL/L (ref 3.5–5)
PROTHROMBIN TIME: 13.8 SEC (ref 12–14.6)
RBC # BLD: 3.24 M/UL (ref 4.2–5.4)
SODIUM BLD-SCNC: 135 MMOL/L (ref 136–145)
TOTAL PROTEIN: 6.3 G/DL (ref 6.6–8.7)
VANCOMYCIN RANDOM: 22.5 UG/ML
WBC # BLD: 10.4 K/UL (ref 4.8–10.8)

## 2022-05-11 PROCEDURE — 83735 ASSAY OF MAGNESIUM: CPT

## 2022-05-11 PROCEDURE — 84100 ASSAY OF PHOSPHORUS: CPT

## 2022-05-11 PROCEDURE — 6370000000 HC RX 637 (ALT 250 FOR IP): Performed by: SURGERY

## 2022-05-11 PROCEDURE — 6360000002 HC RX W HCPCS: Performed by: SURGERY

## 2022-05-11 PROCEDURE — 80170 ASSAY OF GENTAMICIN: CPT

## 2022-05-11 PROCEDURE — 2500000003 HC RX 250 WO HCPCS: Performed by: SURGERY

## 2022-05-11 PROCEDURE — 85610 PROTHROMBIN TIME: CPT

## 2022-05-11 PROCEDURE — 85025 COMPLETE CBC W/AUTO DIFF WBC: CPT

## 2022-05-11 PROCEDURE — 80202 ASSAY OF VANCOMYCIN: CPT

## 2022-05-11 PROCEDURE — 94003 VENT MGMT INPAT SUBQ DAY: CPT

## 2022-05-11 PROCEDURE — 6370000000 HC RX 637 (ALT 250 FOR IP): Performed by: INTERNAL MEDICINE

## 2022-05-11 PROCEDURE — 2700000000 HC OXYGEN THERAPY PER DAY

## 2022-05-11 PROCEDURE — 2100000000 HC CCU R&B

## 2022-05-11 PROCEDURE — 80053 COMPREHEN METABOLIC PANEL: CPT

## 2022-05-11 PROCEDURE — 2580000003 HC RX 258: Performed by: INTERNAL MEDICINE

## 2022-05-11 PROCEDURE — 2580000003 HC RX 258: Performed by: ANESTHESIOLOGY

## 2022-05-11 PROCEDURE — 82947 ASSAY GLUCOSE BLOOD QUANT: CPT

## 2022-05-11 PROCEDURE — 8010000000 HC HEMODIALYSIS ACUTE INPT

## 2022-05-11 PROCEDURE — 2580000003 HC RX 258: Performed by: SURGERY

## 2022-05-11 PROCEDURE — 36415 COLL VENOUS BLD VENIPUNCTURE: CPT

## 2022-05-11 PROCEDURE — 94640 AIRWAY INHALATION TREATMENT: CPT

## 2022-05-11 PROCEDURE — 6360000002 HC RX W HCPCS: Performed by: INTERNAL MEDICINE

## 2022-05-11 PROCEDURE — 99232 SBSQ HOSP IP/OBS MODERATE 35: CPT | Performed by: PSYCHIATRY & NEUROLOGY

## 2022-05-11 PROCEDURE — 99231 SBSQ HOSP IP/OBS SF/LOW 25: CPT

## 2022-05-11 RX ADMIN — PROPOFOL 30 MCG/KG/MIN: 10 INJECTION, EMULSION INTRAVENOUS at 21:12

## 2022-05-11 RX ADMIN — LEVETIRACETAM 500 MG: 5 INJECTION INTRAVENOUS at 01:11

## 2022-05-11 RX ADMIN — ASPIRIN 81 MG 81 MG: 81 TABLET ORAL at 09:10

## 2022-05-11 RX ADMIN — HYDRALAZINE HYDROCHLORIDE 100 MG: 50 TABLET, FILM COATED ORAL at 05:52

## 2022-05-11 RX ADMIN — ACETYLCYSTEINE 600 MG: 200 SOLUTION ORAL; RESPIRATORY (INHALATION) at 06:53

## 2022-05-11 RX ADMIN — HEPARIN SODIUM 5000 UNITS: 5000 INJECTION INTRAVENOUS; SUBCUTANEOUS at 09:10

## 2022-05-11 RX ADMIN — DEXTROSE MONOHYDRATE 125 ML: 100 INJECTION, SOLUTION INTRAVENOUS at 20:08

## 2022-05-11 RX ADMIN — SODIUM CHLORIDE, PRESERVATIVE FREE 20 MG: 5 INJECTION INTRAVENOUS at 09:10

## 2022-05-11 RX ADMIN — ACETYLCYSTEINE 600 MG: 200 SOLUTION ORAL; RESPIRATORY (INHALATION) at 18:35

## 2022-05-11 RX ADMIN — HYDRALAZINE HYDROCHLORIDE 100 MG: 50 TABLET, FILM COATED ORAL at 14:33

## 2022-05-11 RX ADMIN — LEVETIRACETAM 500 MG: 5 INJECTION INTRAVENOUS at 14:34

## 2022-05-11 RX ADMIN — ANORECTAL OINTMENT: 15.7; .44; 24; 20.6 OINTMENT TOPICAL at 05:53

## 2022-05-11 RX ADMIN — IPRATROPIUM BROMIDE AND ALBUTEROL SULFATE 1 AMPULE: 2.5; .5 SOLUTION RESPIRATORY (INHALATION) at 10:33

## 2022-05-11 RX ADMIN — IPRATROPIUM BROMIDE AND ALBUTEROL SULFATE 1 AMPULE: 2.5; .5 SOLUTION RESPIRATORY (INHALATION) at 01:52

## 2022-05-11 RX ADMIN — ANORECTAL OINTMENT: 15.7; .44; 24; 20.6 OINTMENT TOPICAL at 16:46

## 2022-05-11 RX ADMIN — INSULIN LISPRO 2 UNITS: 100 INJECTION, SOLUTION INTRAVENOUS; SUBCUTANEOUS at 09:10

## 2022-05-11 RX ADMIN — HYDRALAZINE HYDROCHLORIDE 10 MG: 20 INJECTION INTRAMUSCULAR; INTRAVENOUS at 01:37

## 2022-05-11 RX ADMIN — HYDRALAZINE HYDROCHLORIDE 100 MG: 50 TABLET, FILM COATED ORAL at 21:10

## 2022-05-11 RX ADMIN — HYDRALAZINE HYDROCHLORIDE 10 MG: 20 INJECTION INTRAMUSCULAR; INTRAVENOUS at 18:20

## 2022-05-11 RX ADMIN — PROPOFOL 30 MCG/KG/MIN: 10 INJECTION, EMULSION INTRAVENOUS at 04:24

## 2022-05-11 RX ADMIN — VANCOMYCIN HYDROCHLORIDE 750 MG: 750 INJECTION, POWDER, LYOPHILIZED, FOR SOLUTION INTRAVENOUS at 16:39

## 2022-05-11 RX ADMIN — HYDRALAZINE HYDROCHLORIDE 100 MG: 50 TABLET, FILM COATED ORAL at 00:13

## 2022-05-11 RX ADMIN — COLLAGENASE SANTYL: 250 OINTMENT TOPICAL at 15:46

## 2022-05-11 RX ADMIN — HEPARIN SODIUM 5000 UNITS: 5000 INJECTION INTRAVENOUS; SUBCUTANEOUS at 21:11

## 2022-05-11 RX ADMIN — IPRATROPIUM BROMIDE AND ALBUTEROL SULFATE 1 AMPULE: 2.5; .5 SOLUTION RESPIRATORY (INHALATION) at 14:37

## 2022-05-11 RX ADMIN — CHLORHEXIDINE GLUCONATE 15 ML: 1.2 RINSE ORAL at 21:12

## 2022-05-11 RX ADMIN — IPRATROPIUM BROMIDE AND ALBUTEROL SULFATE 1 AMPULE: 2.5; .5 SOLUTION RESPIRATORY (INHALATION) at 21:58

## 2022-05-11 RX ADMIN — IPRATROPIUM BROMIDE AND ALBUTEROL SULFATE 1 AMPULE: 2.5; .5 SOLUTION RESPIRATORY (INHALATION) at 18:34

## 2022-05-11 RX ADMIN — CHLORHEXIDINE GLUCONATE 15 ML: 1.2 RINSE ORAL at 09:12

## 2022-05-11 RX ADMIN — HEPARIN SODIUM 5000 UNITS: 5000 INJECTION INTRAVENOUS; SUBCUTANEOUS at 14:33

## 2022-05-11 RX ADMIN — Medication 10 ML: at 09:12

## 2022-05-11 RX ADMIN — INSULIN GLARGINE 20 UNITS: 100 INJECTION, SOLUTION SUBCUTANEOUS at 09:10

## 2022-05-11 RX ADMIN — IPRATROPIUM BROMIDE AND ALBUTEROL SULFATE 1 AMPULE: 2.5; .5 SOLUTION RESPIRATORY (INHALATION) at 06:53

## 2022-05-11 ASSESSMENT — PULMONARY FUNCTION TESTS
PIF_VALUE: 18
PIF_VALUE: 18
PIF_VALUE: 19
PIF_VALUE: 18
PIF_VALUE: 22
PIF_VALUE: 18
PIF_VALUE: 18
PIF_VALUE: 19
PIF_VALUE: 18
PIF_VALUE: 19
PIF_VALUE: 18
PIF_VALUE: 19
PIF_VALUE: 48
PIF_VALUE: 24
PIF_VALUE: 18
PIF_VALUE: 29
PIF_VALUE: 18
PIF_VALUE: 18
PIF_VALUE: 19
PIF_VALUE: 18
PIF_VALUE: 19
PIF_VALUE: 20
PIF_VALUE: 17
PIF_VALUE: 28
PIF_VALUE: 25

## 2022-05-11 ASSESSMENT — PAIN SCALES - GENERAL
PAINLEVEL_OUTOF10: 3
PAINLEVEL_OUTOF10: 1
PAINLEVEL_OUTOF10: 3

## 2022-05-11 NOTE — PROGRESS NOTES
Aminoglycoside: Gentamicin  Day: 4  Current Dosing: Pulse dosing for HD    Temp max: 98    Recent Labs     05/10/22  0230 05/11/22  0432   BUN 45* 57*       Recent Labs     05/10/22  0230 05/11/22  0432   CREATININE 2.8* 3.4*       Recent Labs     05/10/22  0230 05/11/22  0432   WBC 7.2 10.4       Estimated Creatinine Clearance: 18 mL/min (A) (based on SCr of 3.4 mg/dL (H)). Culture Date Source Results   05/06/22 Respiratory MRSA; Raoultella ornithinolytica; Proteus vulgaris       ASSESSMENT/PLAN:    Pre-HD level on 05/11 at 0432 = 5.2    No Gentamicin to be given after HD today. Next level to be drawn on 05/13 at 0400.     Electronically signed by RAHUL Acosta [facility] on 5/11/2022 at 7:34 AM

## 2022-05-11 NOTE — PROGRESS NOTES
Nephrology ([facility]) dialysis Progress Note    Patient:  Carloz Kemp  YOB: 1970  Date of Service: 5/11/2022  MRN: [id number]   Acct: [de-identified]   Primary Care Physician: ROMINA Tim  Advance Directive: Full Code  Admit Date: 4/24/2022       Hospital Day: 16  Referring Provider: Rica Miller MD    Patient Seen, Chart, Consults, Notes, Labs, Radiology studies reviewed. Subjective:  Carloz Kemp is a 47 y.o. female for whom we were consulted for evaluation and treatment of end-stage renal disease.  Patient also has a history of seizure disorder, type 2 diabetes, hypertension, frequent hospitalization with noncompliance and poorly controlled diabetes.  Patient was brought to the emergency room after she was found to have unresponsiveness and witnessed grand mal seizure. Chen Cole was brought in by ambulance.  On arrival in the emergency room, she was found to be severely hyperglycemic and had a urinary tract infection.  Patient was intubated, sedated and treated by neurology for grand mal seizure. Chen Cole was also receiving IV antibiotics and insulin drip. [facility] blood sugar control had significantly improved.  On April 25, she has received emergent hemodialysis treatment for correction of volume status as well as hyponatremia.  She was moved from ICU to CCU. Patient remains on the ventilator and unable to participate in the history and physical examination. Events reviewed with nursing at the bedside. She is seen on dialysis today. She is s/p trach and PEG placement on 5/10.     Dialysis   Pt was seen on RRT  Modality: Hemodialysis  Access: Arterial Venous Fistula  Location: left upper  QB: 400  QD: 600  UF: 2.2 liters      Allergies:  Penicillins, Lamisil advanced [terbinafine], Reglan [metoclopramide], and Stadol [butorphanol]    Medicines:  Current Facility-Administered Medications   Medication Dose Route Frequency Provider Last Rate Last Admin    vancomycin (VANCOCIN) 750 mg in dextrose 5 % 250 mL IVPB  750 mg IntraVENous Once Juliennee Leigh, DO        sodium chloride flush 0.9 % injection 5-40 mL  5-40 mL IntraVENous 2 times per day Job Cárdenas MD        sodium chloride flush 0.9 % injection 5-40 mL  5-40 mL IntraVENous PRN Job Cárdenas MD        0.9 % sodium chloride infusion   IntraVENous PRN Job Cárdenas MD        sodium chloride flush 0.9 % injection 5-40 mL  5-40 mL IntraVENous 2 times per day Monty Dumont MD   10 mL at 05/11/22 0912    sodium chloride flush 0.9 % injection 5-40 mL  5-40 mL IntraVENous PRN Monty Dumont MD        0.9 % sodium chloride infusion   IntraVENous PRN Monty Dumont MD        HYDROmorphone HCl PF (DILAUDID) injection 0.25 mg  0.25 mg IntraVENous Q5 Min PRN Monty Dumont MD        HYDROmorphone HCl PF (DILAUDID) injection 0.5 mg  0.5 mg IntraVENous Q5 Min PRN Monty Dumont MD        vancomycin Joesphine No) intermittent dosing (placeholder)   Other RX Placeholder Job Cárdenas MD        aminoglycoside intermittent dosing (placeholder)   Other RX Placeholder Job Cárdenas MD        acetylcysteine (MUCOMYST) 20 % solution 600 mg  600 mg Inhalation BID Job Cárdenas MD   600 mg at 05/11/22 0653    ipratropium-albuterol (DUONEB) nebulizer solution 1 ampule  1 ampule Inhalation Q4H Job Cárdenas MD   1 ampule at 05/11/22 0653    insulin glargine (LANTUS) injection vial 20 Units  20 Units SubCUTAneous BID Job Cárdenas MD   20 Units at 05/11/22 0910    hydrALAZINE (APRESOLINE) injection 10 mg  10 mg IntraVENous Q6H PRN Job Cárdenas MD   10 mg at 05/11/22 0137    aspirin chewable tablet 81 mg  81 mg Oral Daily Job Cárdenas MD   81 mg at 05/11/22 0910    racepinephrine HCl (VAPONEFPRIN) 2.25 % nebulizer solution NEBU 11.25 mg  11.25 mg Nebulization Q4H PRN Job Cárdenas MD   11.25 mg at 05/03/22 1408    sodium chloride nebulizer 0.9 % solution 3 mL  3 mL Nebulization Q4H PRN Batool Roberson Gisselle Botello MD        propofol injection  5-50 mcg/kg/min IntraVENous Continuous Denilson Zaragoza MD 10.8 mL/hr at 05/11/22 0911 30 mcg/kg/min at 05/11/22 0911    heparin (porcine) injection 5,000 Units  5,000 Units SubCUTAneous TID Denilson Zaragoza MD   5,000 Units at 05/11/22 7851    Peppermint Spirit SPRT   Does not apply PRN Denilson Zaragoza MD        dexmedetomidine [facility]) 400 mcg in sodium chloride 0.9 % 100 mL infusion  0.1-1.5 mcg/kg/hr IntraVENous Continuous Denilson Zaragoza MD   Stopped at 05/03/22 1143    menthol-zinc oxide (CALMOSEPTINE) 0.44-20.6 % ointment   Topical BID Denilson Zaragoza MD   Given at 05/11/22 0553    sodium phosphate 10.5 mmol in sodium chloride 0.9 % 250 mL IVPB  0.16 mmol/kg IntraVENous PRN Denilson Zaragoza MD        Or    sodium phosphate 21 mmol in sodium chloride 0.9 % 250 mL IVPB  0.32 mmol/kg IntraVENous PRN Denilson Zaragoza MD        insulin lispro (HUMALOG) injection vial 0-12 Units  0-12 Units SubCUTAneous TID WC Denilson Zaragoza MD   2 Units at 05/11/22 0910    insulin lispro (HUMALOG) injection vial 0-6 Units  0-6 Units SubCUTAneous Nightly Denilson Zaragoza MD   1 Units at 05/09/22 2119    hydrALAZINE (APRESOLINE) tablet 100 mg  100 mg Oral 3 times per day Denilson Zaragoza MD   100 mg at 05/11/22 0552    glucagon (rDNA) injection 1 mg  1 mg IntraMUSCular PRN Denilson Zaragoza MD        dextrose 5 % solution  100 mL/hr IntraVENous PRN Denilson Zaragoza MD        glucose chewable tablet 16 g  4 tablet Oral PRN Denilson Zaragoza MD        dextrose bolus (hypoglycemia) 10% 125 mL  125 mL IntraVENous PRN Denilson Zaragoza MD   Stopped at 05/09/22 1250    Or    dextrose bolus (hypoglycemia) 10% 250 mL  250 mL IntraVENous PRN Denilson Zaragoza MD   Stopped at 05/10/22 2026    sodium chloride flush 0.9 % injection 5-40 mL  5-40 mL IntraVENous 2 times per day Denilson Zaragoza MD   10 mL at 05/10/22 0856    sodium chloride flush 0.9 % injection 5-40 mL  5-40 mL IntraVENous PRN Bang Rico Nolvia Leon MD        0.9 % sodium chloride infusion   IntraVENous PRN Reji oNlen MD   Stopped at 05/03/22 2005    LORazepam (ATIVAN) injection 1 mg  1 mg IntraVENous Q5 Min PRN Reji Nolen MD        ondansetron (ZOFRAN-ODT) disintegrating tablet 4 mg  4 mg Oral Q8H PRN Reji Nolen MD        Or    ondansetron [facility]) injection 4 mg  4 mg IntraVENous Q6H PRN Reji Nolen MD        polyethylene glycol [facility]) packet 17 g  17 g Oral Daily PRN Reji Nolen MD        acetaminophen (TYLENOL) tablet 650 mg  650 mg Oral Q6H PRN Reji Nolen MD   650 mg at 04/25/22 0035    Or    acetaminophen (TYLENOL) suppository 650 mg  650 mg Rectal Q6H PRN Reij Nolen MD        levETIRAcetam (KEPPRA) 500 mg/100 mL IVPB  500 mg IntraVENous Q12H Reji Nolen MD   Stopped at 05/11/22 0126    chlorhexidine (PERIDEX) 0.12 % solution 15 mL  15 mL Mouth/Throat BID Reji Nolen MD   15 mL at 05/11/22 0912    famotidine (PEPCID) 20 mg in sodium chloride (PF) 10 mL injection  20 mg IntraVENous Daily Reji Nolen MD   20 mg at 05/11/22 5919    magic butt cream   Topical Q4H PRN Reji Nolen MD   Given at 05/03/22 0940    magnesium sulfate 1000 mg in dextrose 5% 100 mL IVPB  1,000 mg IntraVENous PRN Reji Nolen MD        potassium bicarb-citric acid (EFFER-K) effervescent tablet 40 mEq  40 mEq Oral PRN Reji Nolen MD        Or    potassium chloride 10 mEq/100 mL IVPB (Peripheral Line)  10 mEq IntraVENous PRN Reji Nolen MD   Stopped at 04/25/22 3229       Past Medical History:  Past Medical History:   Diagnosis Date    Acute ischemic stroke ([facility] 4/30/2022    Arthritis     Chronic kidney disease, stage 5, kidney failure ([address]     Closed fracture of right shoulder girdle, with routine healing, subsequent encounter     DM (diabetes mellitus) type II controlled with renal manifestation ([facility] 06/1993    Gastroparesis     GERD (gastroesophageal reflux disease)     Hemodialysis patient ([address]     dialysis on tues, thur, and sat at [facility] Hypertension     Hypothyroid     Nasal congestion     recent    Neuropathy     Noncompliance with medications     Palliative care patient 10/08/2019    Restless leg syndrome        Past Surgical History:  Past Surgical History:   Procedure Laterality Date    BREAST SURGERY Left 2008    infected milk gland removed    BREAST SURGERY Right 5/25/2021    WEDGE EXCISION RIGHT BREAST DUCT WITH LACRIMAL DUCT PROBE, PEC BLOCK performed by Qamar Purvis MD at [facility]      2008    COLONOSCOPY Left 9/17/2020    Dr Dany Tirado hepatic flexure-Severe tortuosity with severe spasming, 1 yr recall    DIALYSIS FISTULA CREATION Left 1/25/2019    REVISION LEFT UPPER EXTREMITY AV ACCESS WITH LEFT BRACHIAL ARTERY TO LEFT AXILLARY VEIN WITH  INTERPOSITIONAL ARTEGRAFT   performed by Ani Griffith MD at [address]  2012    GASTROSTOMY TUBE PLACEMENT N/A 5/10/2022    PERCUTANEOUS ENDOSCOPIC GASTROSTOMY TUBE PLACEMENT, performed by Shauna Matute MD at [address] [place] [address] [address] Right 1/25/2019    INSERTION OF RIGHT INTERNAL JUGULAR HEMODIALYSIS CATHETER performed by Ani Griffith MD at [facility]  08/17/2018    1.  Moderately increased stainable iron identified by special stain in Kupffer cells and occasional hepatocytes. Negative for evidence of significant portal or lobular inflammation.  Negative for evidence of significant fibrosis    PA COLONOSCOPY W/BIOPSY SINGLE/MULTIPLE N/A 10/20/2017    Dr Payal Lemos prep-Tubular AP (-) dysplasia x 2--3 yr recall    PA EGD TRANSORAL BIOPSY SINGLE/MULTIPLE N/A 12/27/2016    Dr Carlson Quiet EGD TRANSORAL BIOPSY SINGLE/MULTIPLE N/A 10/20/2017    Dr Gamaliel White (-)    TRACHEOSTOMY  5/10/2022    TRACHEOSTOMY PERCUTANEOUS performed by Shauna Matute MD at [facility] VASCULAR SURGERY Left 2016    fistula by  [place] at [address]  10/02/2017    [facility]. Left upper fistulograms/venograms, balloon angioplasty cephalic vein arch 09C70 conquest.    VASCULAR SURGERY  2018    FISTULOGRAM    VASCULAR SURGERY  10/29/2018    [facility]. Left upper fistulograms/venograms    VASCULAR SURGERY  2018    [facility]. Arch aortogram,left upper arteriograms.  VASCULAR SURGERY  2019    [facility]. Removal of tunneled dialyis catheter right internal jugular vein.  VASCULAR SURGERY  2019    [facility]. Left upper extremity fistulogram including venography to the superior vena cava. Balloon angioplasty left subclavian vein with 10mm x 40mm conquest balloon. Balloon angioplasty mid/proximal upper arm cephalic vein with 50SM x 40mm conquest balloon. Venograms after balloon angioplasty. Stent left mid/proximal upper arm cephalic vein stenosis fluency 10mm x 60mm self-expanding covered stent. Balloon     VASCULAR SURGERY  2019    cont angioplasty stent with 10mm x 40mm conquest balloon. Completion venograms left upper extremity.        Family History  Family History   Problem Relation Age of Onset    Colon Cancer Mother          at 63    Colon Polyps Mother     Lung Cancer Father          at 79    Colon Polyps Father     Stomach Cancer Sister     Breast Cancer Sister 27    Depression Daughter 25        committed suicide    Liver Cancer Neg Hx     Liver Disease Neg Hx     Esophageal Cancer Neg Hx     Rectal Cancer Neg Hx        Social History  Social History     Socioeconomic History    Marital status: Single     Spouse name: Not on file    Number of children: 2    Years of education: Not on file    Highest education level: Not on file   Occupational History    Not on file   Tobacco Use    Smoking status: Current Every Day Smoker     Packs/day: 0.50     Years: 33.00     Pack years: 16.50     Types: Cigarettes    Smokeless tobacco: Never Used    Tobacco comment: NOW at 1/4 PD, started at age 24 and has averaged 2 PPD   Vaping Use    Vaping Use: Never used   Substance and Sexual Activity    Alcohol use: No    Drug use: Not Currently     Types: Marijuana Delia Nayla)    Sexual activity: Not Currently     Partners: Male   Other Topics Concern    Not on file   Social History Narrative    Not on file     Social Determinants of Health     Financial Resource Strain:     Difficulty of Paying Living Expenses: Not on file   Food Insecurity:     Worried About Running Out of Food in the Last Year: Not on file    Kendy of Food in the Last Year: Not on file   Transportation Needs:     Lack of Transportation (Medical): Not on file    Lack of Transportation (Non-Medical): Not on file   Physical Activity:     Days of Exercise per Week: Not on file    Minutes of Exercise per Session: Not on file   Stress:     Feeling of Stress : Not on file   Social Connections:     Frequency of Communication with Friends and Family: Not on file    Frequency of Social Gatherings with Friends and Family: Not on file    Attends [employer] Services: Not on file    Active Member of [address] [place] [address] or Organizations: Not on file    Attends Club or Organization Meetings: Not on file    Marital Status: Not on file   Intimate Partner Violence:     Fear of Current or Ex-Partner: Not on file    Emotionally Abused: Not on file    Physically Abused: Not on file    Sexually Abused: Not on file   Housing Stability:     Unable to Pay for Housing in the Last Year: Not on file    Number of [place] in the Last Year: Not on file    Unstable Housing in the Last Year: Not on file         Review of Systems:  Reviewed with the patient at the bedside, 6 points reviewed and negative except as noted above. Objective:  Blood pressure (!) 114/56, pulse 94, temperature 97.1 °F (36.2 °C), temperature source Axillary, resp. rate 15, height 5' 6\" (1.676 m), weight 139 lb 3.2 oz (63.1 kg), SpO2 98 %.     Intake/Output Summary (Last 24 hours) at 5/11/2022 1019  Last data filed at 5/11/2022 0911  Gross per 24 hour   Intake 1182. 85 ml   Output 0 ml   Net 1182. 85 ml     General: trached, sedated  Chest: bilateral air entry, decreased breath sounds at the bases  CVS: regular rate and rhythm  Abdominal: soft, nontender, normal bowel sounds  Extremities: no cyanosis or edema  Skin: warm and dry without rash    Labs:  BMP:   Recent Labs     05/09/22  0130 05/10/22  0230 05/11/22  0432    135* 135*   K 4.6 4.4 5.2*   CL 90* 90* 90*   CO2 23 24 21*   PHOS 8.3* 6.1* 6.9*   BUN 76* 45* 57*   CREATININE 4.0* 2.8* 3.4*   CALCIUM 8.5* 9.0 9.2     CBC:   Recent Labs     05/09/22  0130 05/10/22  0230 05/11/22  0432   WBC 11.7* 7.2 10.4   HGB 8.1* 8.4* 9.3*   HCT 27.5* 28.5* 30.6*   MCV 95.5 96.0 94.4    262 322     LIVER PROFILE:   Recent Labs     05/09/22  0130 05/10/22  0230 05/11/22  0432   AST 13 14 15   ALT 6 7 7   BILITOT 0.3 0.3 0.3   ALKPHOS 85 90 101     PT/INR:   Recent Labs     05/09/22  0130 05/10/22  0230 05/11/22  0432   PROTIME 14.1 13.1 13.8   INR 1.09 1.00 1.07     APTT: No results for input(s): APTT in the last 72 hours. BNP:  No results for input(s): BNP in the last 72 hours. Ionized Calcium:No results for input(s): IONCA in the last 72 hours. Magnesium:  Recent Labs     05/09/22  0130 05/10/22  0230 05/11/22  0432   MG 2.5 2.4 2.6     Phosphorus:  Recent Labs     05/09/22  0130 05/10/22  0230 05/11/22  0432   PHOS 8.3* 6.1* 6.9*     HgbA1C: No results for input(s): LABA1C in the last 72 hours. Hepatic:   Recent Labs     05/09/22  0130 05/10/22  0230 05/11/22  0432   ALKPHOS 85 90 101   ALT 6 7 7   AST 13 14 15   PROT 5.6* 6.0* 6.3*   BILITOT 0.3 0.3 0.3   LABALBU 2.7* 3.0* 3.2*     Lactic Acid: No results for input(s): LACTA in the last 72 hours. Troponin: No results for input(s): CKTOTAL, CKMB, TROPONINT in the last 72 hours. ABGs: No results for input(s): PH, PCO2, PO2, HCO3, O2SAT in the last 72 hours.   CRP:  No results for input(s): CRP in the last 72 hours. Sed Rate:  No results for input(s): SEDRATE in the last 72 hours. Cultures:   No results for input(s): CULTURE in the last 72 hours. No results for input(s): BC, Gevena Elgin in the last 72 hours. No results for input(s): CXSURG in the last 72 hours. Radiology reports as per the Radiologist  Radiology: CT Head WO Contrast    Result Date: 4/24/2022  CT HEAD WO CONTRAST 4/24/2022 2:01 PM HISTORY: Altered mental status COMPARISON: CT scan dated 11/18/2021 DOSE LENGTH PRODUCT: 766 mGy cm TECHNIQUE: Helical tomographic images of the brain were obtained without the use of intravenous contrast. Automated exposure control was also utilized to decrease patient radiation dose. FINDINGS: There is no evidence of evolving large vascular territory infarct. No visualized intra-axial or extra-axial hemorrhage. No mass lesion is identified. Normal size and configuration of the ventricular system. The basal cisterns are symmetric. Posterior fossa structures are unremarkable. The included orbits and their contents are unremarkable. Chronic paranasal sinus disease on the left. The visualized osseous structures and overlying soft tissues of the skull and face are unremarkable. 1. No acute intracranial process. Signed by Dr Mirna Blackmon    Result Date: 4/24/2022  XR CHEST PORTABLE 4/24/2022 4:27 PM HISTORY: ET tube, enteric tube  Technique: Single AP view of the chest COMPARISONS: 4/24/2022 FINDINGS: Endotracheal tube is identified with tip essentially touching the krysta. Enteric tube courses into the stomach with tip curled in the fundus back towards the gastroesophageal junction. The lungs are clear and well expanded. Heart size is stable. Pulmonary vasculature are nondilated. No pleural effusion or pneumothorax. 1. Endotracheal tube is deep, tip essentially touching the krysta. Lungs are clear and well expanded. I would recommend 2-3 cm withdrawal for a more optimal positioning.  The results of this exam were discussed with Blake Branch (ICU nursing) on 4/24/2022 at 1640 hours Signed by Dr Allyson Napier    XR CHEST PORTABLE    Result Date: 4/24/2022  XR CHEST PORTABLE 4/24/2022 1:17 PM HISTORY: ET tube placement  Technique: Single AP view of the chest COMPARISONS: 4/24/2022 FINDINGS: Status post endotracheal intubation. ET tube is in good position. Lungs are well-expanded. Enteric tube curls on itself in the midesophagus and then extends back up into the throat. Heart size is stable. Pulmonary vasculature are nondilated. 1. ET tube is in good position. Lungs are clear and well expanded. 2. Enteric tube curls on itself in the mid esophagus before extending back up into the throat. This will need repositioned. The results of this exam were discussed with Dr. Kalani Rosario on 4/24/2022 at 1329 hours Signed by Dr Allyson Napier    XR CHEST PORTABLE    Result Date: 4/24/2022  XR CHEST PORTABLE 4/24/2022 12:34 PM HISTORY: Altered mental status  Technique: Single AP view of the chest COMPARISONS: Chest exam dated 4/1/2022 FINDINGS: No lung consolidation. No pleural effusion or pneumothorax. Cardiomediastinal silhouette and pulmonary vasculature are unremarkable. No acute bony abnormality. Left subclavian region vascular stent with additional stent projecting over the left humerus. No acute cardiopulmonary process. Signed by Dr Tiffany Day   -ESRD  -DM2 with nephropathy on long-term insulin, uncontrolled  -HTN  -Hyponatremia  -Hypokalemia  -Secondary Hyperparathyroidism  -Anemia CKD  -Seizure disorder  -Hypothyroidism   -Acuter resp failure       Plan:  Dialysis as above. Follow up labs. Awaiting final disposition for long term vent weaning and dialysis.        Aleshia Aburto MD, MD  05/11/22  10:19 AM

## 2022-05-11 NOTE — CARE COORDINATION
Bere with [facility] following pt after trach/PEG placement.  To initiate precert for insurance approval of services 5/10    [facility]  Ph: [phone]  Fa: [phone]   Referrals: [phone] Venita Barrios

## 2022-05-11 NOTE — PROGRESS NOTES
vena cava. Balloon angioplasty left subclavian vein with 10mm x 40mm conquest balloon. Balloon angioplasty mid/proximal upper arm cephalic vein with 50BB x 40mm conquest balloon. Venograms after balloon angioplasty. Stent left mid/proximal upper arm cephalic vein stenosis fluency 10mm x 60mm self-expanding covered stent. Balloon     VASCULAR SURGERY  2019    cont angioplasty stent with 10mm x 40mm conquest balloon. Completion venograms left upper extremity.        Family History   Problem Relation Age of Onset    Colon Cancer Mother          at 63    Colon Polyps Mother     Lung Cancer Father          at 79    Colon Polyps Father     Stomach Cancer Sister     Breast Cancer Sister 27    Depression Daughter 25        committed suicide    Liver Cancer Neg Hx     Liver Disease Neg Hx     Esophageal Cancer Neg Hx     Rectal Cancer Neg Hx        Social History     Socioeconomic History    Marital status: Single     Spouse name: Not on file    Number of children: 2    Years of education: Not on file    Highest education level: Not on file   Occupational History    Not on file   Tobacco Use    Smoking status: Current Every Day Smoker     Packs/day: 0.50     Years: 33.00     Pack years: 16.50     Types: Cigarettes    Smokeless tobacco: Never Used    Tobacco comment: NOW at 1/4 PD, started at age 24 and has averaged 2 PPD   Vaping Use    Vaping Use: Never used   Substance and Sexual Activity    Alcohol use: No    Drug use: Not Currently     Types: Marijuana Rhea Kales)    Sexual activity: Not Currently     Partners: Male   Other Topics Concern    Not on file   Social History Narrative    Not on file     Social Determinants of Health     Financial Resource Strain:     Difficulty of Paying Living Expenses: Not on file   Food Insecurity:     Worried About Running Out of Food in the Last Year: Not on file    Kendy of Food in the Last Year: Not on file   Transportation Needs:     Lack of Transportation (Medical): Not on file    Lack of Transportation (Non-Medical):  Not on file   Physical Activity:     Days of Exercise per Week: Not on file    Minutes of Exercise per Session: Not on file   Stress:     Feeling of Stress : Not on file   Social Connections:     Frequency of Communication with Friends and Family: Not on file    Frequency of Social Gatherings with Friends and Family: Not on file    Attends [employer] Services: Not on file    Active Member of Clubs or Organizations: Not on file    Attends Club or Organization Meetings: Not on file    Marital Status: Not on file   Intimate Partner Violence:     Fear of Current or Ex-Partner: Not on file    Emotionally Abused: Not on file    Physically Abused: Not on file    Sexually Abused: Not on file   Housing Stability:     Unable to Pay for Housing in the Last Year: Not on file    Number of Places Lived in the Last Year: Not on file    Unstable Housing in the Last Year: Not on file       Medications:   sodium chloride      sodium chloride      propofol 30 mcg/kg/min (05/11/22 0911)    dexmedetomidine HCl in NaCl Stopped (05/03/22 1143)    dextrose      sodium chloride Stopped (05/03/22 2005)      vancomycin  750 mg IntraVENous Once    sodium chloride flush  5-40 mL IntraVENous 2 times per day    sodium chloride flush  5-40 mL IntraVENous 2 times per day    vancomycin (VANCOCIN) intermittent dosing (placeholder)   Other RX Placeholder    aminoglycoside intermittent dosing (placeholder)   Other RX Placeholder    acetylcysteine  600 mg Inhalation BID    ipratropium-albuterol  1 ampule Inhalation Q4H    insulin glargine  20 Units SubCUTAneous BID    aspirin  81 mg Oral Daily    heparin (porcine)  5,000 Units SubCUTAneous TID    menthol-zinc oxide   Topical BID    insulin lispro  0-12 Units SubCUTAneous TID WC    insulin lispro  0-6 Units SubCUTAneous Nightly    hydrALAZINE  100 mg Oral 3 times per day    sodium chloride flush  5-40 mL IntraVENous 2 times per day    levetiracetam  500 mg IntraVENous Q12H    chlorhexidine  15 mL Mouth/Throat BID    famotidine (PEPCID) injection  20 mg IntraVENous Daily       Diagnostic Studies:  Reviewed all labs/diagnositcs since last 24hrs:  Recent Results (from the past 24 hour(s))   POCT Glucose    Collection Time: 05/10/22  5:01 PM   Result Value Ref Range    POC Glucose 50 (L) 70 - 99 mg/dl    Performed on [facility]    POCT Glucose    Collection Time: 05/10/22  5:33 PM   Result Value Ref Range    POC Glucose 89 70 - 99 mg/dl    Performed on [facility]    POCT Glucose    Collection Time: 05/10/22  8:06 PM   Result Value Ref Range    POC Glucose 43 (LL) 70 - 99 mg/dl    Performed on [facility]    POCT Glucose    Collection Time: 05/10/22  8:28 PM   Result Value Ref Range    POC Glucose 163 (H) 70 - 99 mg/dl    Performed on [facility]uChek    POCT Glucose    Collection Time: 05/11/22 12:17 AM   Result Value Ref Range    POC Glucose 97 70 - 99 mg/dl    Performed on AccuChek    Vancomycin Level, Random    Collection Time: 05/11/22  4:32 AM   Result Value Ref Range    Vancomycin Rm 22.5 (HH) ug/mL   Gentamicin Level, Random    Collection Time: 05/11/22  4:32 AM   Result Value Ref Range    Gentamicin Rm 5.2 ug/mL    Gentamicin Dose Amount Unknown    CBC with Auto Differential    Collection Time: 05/11/22  4:32 AM   Result Value Ref Range    WBC 10.4 4.8 - 10.8 K/uL    RBC 3.24 (L) 4.20 - 5.40 M/uL    Hemoglobin 9.3 (L) 12.0 - 16.0 g/dL    Hematocrit 30.6 (L) 37.0 - 47.0 %    MCV 94.4 81.0 - 99.0 fL    MCH 28.7 27.0 - 31.0 pg    MCHC 30.4 (L) 33.0 - 37.0 g/dL    RDW 19.1 (H) 11.5 - 14.5 %    Platelets 136 671 - 659 K/uL    MPV 9.5 9.4 - 12.3 fL    Neutrophils % 81.0 (H) 50.0 - 65.0 %    Lymphocytes % 9.9 (L) 20.0 - 40.0 %    Monocytes % 7.8 0.0 - 10.0 %    Eosinophils % 0.6 0.0 - 5.0 %    Basophils % 0.3 0.0 - 1.0 %    Neutrophils Absolute 8.4 (H) 1.5 - 7.5 K/uL    Immature Granulocytes # 0.0 K/uL Lymphocytes Absolute 1.0 (L) 1.1 - 4.5 K/uL    Monocytes Absolute 0.80 0.00 - 0.90 K/uL    Eosinophils Absolute 0.10 0.00 - 0.60 K/uL    Basophils Absolute 0.00 0.00 - 0.20 K/uL   Comprehensive Metabolic Panel    Collection Time: 05/11/22  4:32 AM   Result Value Ref Range    Sodium 135 (L) 136 - 145 mmol/L    Potassium 5.2 (H) 3.5 - 5.0 mmol/L    Chloride 90 (L) 98 - 111 mmol/L    CO2 21 (L) 22 - 29 mmol/L    Anion Gap 24 (H) 7 - 19 mmol/L    Glucose 96 74 - 109 mg/dL    BUN 57 (H) 6 - 20 mg/dL    CREATININE 3.4 (H) 0.5 - 0.9 mg/dL    GFR Non-African American 14 (A) >60    GFR  17 (L) >59    Calcium 9.2 8.6 - 10.0 mg/dL    Total Protein 6.3 (L) 6.6 - 8.7 g/dL    Albumin 3.2 (L) 3.5 - 5.2 g/dL    Total Bilirubin 0.3 0.2 - 1.2 mg/dL    Alkaline Phosphatase 101 35 - 104 U/L    ALT 7 5 - 33 U/L    AST 15 5 - 32 U/L   Magnesium    Collection Time: 05/11/22  4:32 AM   Result Value Ref Range    Magnesium 2.6 1.6 - 2.6 mg/dL   Phosphorus    Collection Time: 05/11/22  4:32 AM   Result Value Ref Range    Phosphorus 6.9 (H) 2.5 - 4.5 mg/dL   Protime-INR    Collection Time: 05/11/22  4:32 AM   Result Value Ref Range    Protime 13.8 12.0 - 14.6 sec    INR 1.07 0.88 - 1.18   POCT Glucose    Collection Time: 05/11/22  7:14 AM   Result Value Ref Range    POC Glucose 146 (H) 70 - 99 mg/dl    Performed on [facility]uChek    POCT Glucose    Collection Time: 05/11/22 12:14 PM   Result Value Ref Range    POC Glucose 125 (H) 70 - 99 mg/dl    Performed on AccuChek        Diet:  Diet NPO Exceptions are: Sips of Water with Meds  ADULT TUBE FEEDING; PEG; Peptide Based High Protein; Continuous; 20; Yes; 10; Q 6 hours; 48; 0; Other (specify); no free water flush    Examination:  Vitals: BP (!) 145/62   Pulse 86   Temp 97 °F (36.1 °C) (Axillary)   Resp 14   Ht 5' 6\" (1.676 m)   Wt 139 lb 3.2 oz (63.1 kg)   SpO2 99%   BMI 22.47 kg/m²      Intake/Output Summary (Last 24 hours) at 5/11/2022 1230  Last data filed at 5/11/2022 1140  Gross per 24 hour   Intake 1135.55 ml   Output 2200 ml   Net -1064.45 ml     General appearance:  She is a chronically ill appearing woman who is intubated, sedated, and unresponsive in the CCU. HEENT: normocephalic, atraumatic. Lungs: clear to auscultation bilaterally  Heart: regular rate and rhythm, S1, S2 normal, no murmur, click, rub or gallop  Extremities: extremities normal, atraumatic, no cyanosis or edema  Neurologic:   She opens eyes to voice.  She has a left gaze preference.  Pupils are small and reactive.  Corneal reflexes are present.  Oculocephalic reflexes are absent.  No blink to threat.  No facial asymmetry.  She does not move or withdraw extremities.     Assessment:   1.         Large left hemisphere stroke  2.         Seizures  3.         Acute hypoxemic respiratory failure  4.         Diabetes with nonketotic hyperosmolar hyperglycemia  5.         ESRD    Plan:   1.         Continue Keppra  2.         Continue ASA  3.         Continue supportive care  4.         Limit sedation as able     Discharge planning:   Long term care    Reviewed treatment plans with the patient and/or family. 25 minutes spent in face to face interaction and coordination of care.      Electronically signed by Josie Glass MD on 5/11/2022 at 12:30 PM

## 2022-05-11 NOTE — PROGRESS NOTES
[facility]  Follow-up Progress Note       NAME:  Moni Cho  MEDICAL RECORD NUMBER:  [id number]  AGE:  47 y.o. GENDER:  female  :  1970  TODAY'S DATE:  2022    Subjective:   Wound Identification:  Wound Type: pressure  Contributing Factors: chronic pressure, decreased mobility, malnutrition and incontinence of stool        Patient Goal of Care:  [] Wound Healing  [] Odor Control  [] Palliative Care  [] Pain Control   [x] Other: not participating in care planning    Objective:    BP (!) 190/67   Pulse 86   Temp 97.5 °F (36.4 °C)   Resp 14   Ht 5' 6\" (1.676 m)   Wt 139 lb 3.2 oz (63.1 kg)   SpO2 97%   BMI 22.47 kg/m²   Marek Risk Score: Marek Scale Score: 13  Assessment:   Measurements:  Wound 22 Sacrum (Active)   Wound Image   22 1100   Wound Etiology Pressure Unstageable 22 1100   Dressing Status New dressing applied 22 1100   Wound Cleansed Soap and water 22 1100   Dressing/Treatment Calmoseptine/zinc oxide with menthol 22 1100   Dressing Change Due 22 1100   Wound Length (cm) 3 cm 22 1100   Wound Width (cm) 8.5 cm 22 1100   Wound Depth (cm) 0 cm 22 0538   Wound Surface Area (cm^2) 25.5 cm^2 22 1100   Change in Wound Size % (l*w) -2450 22 1100   Wound Volume (cm^3) 0 cm^3 22 0538   Wound Assessment Eschar moist;Slough; Purple/maroon 22 1100   Drainage Amount None 22 1100   Drainage Description Purulent 22 0400   Odor None 22 1100   Tracie-wound Assessment Blanchable erythema; Maceration 22 1100   Margins Undefined edges 22 1100   Number of days: 15       Response to treatment:  Well tolerated by patient.      Pain Assessment:  Severity:  0 / 10  Quality of pain: N/A  Wound Pain Timing/Severity: none  Premedicated: N/A  Plan:   Plan of Care: Wound 04/26/22 Sacrum-Dressing/Treatment: Calmoseptine/zinc oxide with menthol    Specialty Bed Required : Yes   [x] Low Air Loss - on ICU isolibrium mattress  [] Pressure Redistribution  [] Fluid Immersion  [] Bariatric  [] Total Pressure Relief  [] Other:     Current Diet: Diet NPO Exceptions are: Sips of Water with Meds  ADULT TUBE FEEDING; PEG; Peptide Based High Protein; Continuous; 20; Yes; 10; Q 6 hours; 48; 0; Other (specify); no free water flush  Dietician consult:  Yes    Discharge Plan:  Placement for patient upon discharge: intermediate care facility   Patient appropriate for Outpatient [address]: Yes    Referrals:  []   [] [facility] [facility]  [] Supplies  [] Other    Patient/Caregiver Teaching:  Level of patient/caregiver understanding able to:   [] Indicates understanding       [] Needs reinforcement  [] Unsuccessful      [] Verbal Understanding  [] Demonstrated understanding       [] No evidence of learning  [] Refused teaching         [x] N/A       Deep tissue pressure injury at the sacrum has now evolved into unstageable pressure injury with slough and eschar obscuring underlying structures in wound bed. Fecal management system tube is still leaking and soiling patient. Recommendation:    D/C Calmoseptine to sacrum. Continue to apply to buttocks and perineum around rectal tube. Keep patient turned and positioned so that pressure is relieved off the sacrum. Reposition to opposite side every 2 hours. Keep heels floated at all times. SACRUM: Clean with soap and water. Apply Santyl to slough and eschar. Cover with NS moistened 2x2, then cover with vasoline gauze, and sacral foam dressing. Change 1 times daily and if dressing becomes saturated, soiled, or dislodged.     Electronically signed by Eric Amin RN, [facility] on 5/11/2022 at 2:37 PM

## 2022-05-11 NOTE — PROGRESS NOTES
Hospitalist Progress Note  [facility]     Patient: Army Holt  : 1970  MRN: [id number]  Code Status: Full Code    Hospital Day: 17   Date of Service: 2022    Subjective:   Patient seen and examined. Trached and sedated.     Past Medical History:   Diagnosis Date    Acute ischemic stroke ([facility] [address] 2022    Arthritis     Chronic kidney disease, stage 5, kidney failure (HCC)     Closed fracture of right shoulder girdle, with routine healing, subsequent encounter     DM (diabetes mellitus) type II controlled with renal manifestation ([facility] [address] 1993    Gastroparesis     GERD (gastroesophageal reflux disease)     Hemodialysis patient ([facility] [address]     dialysis on tues, thur, and sat at [facility]    Hypertension     Hypothyroid     Nasal congestion     recent    Neuropathy     Noncompliance with medications     Palliative care patient 10/08/2019    Restless leg syndrome        Past Surgical History:   Procedure Laterality Date    BREAST SURGERY Left     infected milk gland removed    BREAST SURGERY Right 2021    WEDGE EXCISION RIGHT BREAST DUCT WITH LACRIMAL DUCT PROBE, PEC BLOCK performed by Elena De Jesus MD at [facility], LAPAROSCOPIC          COLONOSCOPY Left 2020    Dr Esa Montes hepatic flexure-Severe tortuosity with severe spasming, 1 yr recall    DIALYSIS FISTULA CREATION Left 2019    REVISION LEFT UPPER EXTREMITY AV ACCESS WITH LEFT BRACHIAL ARTERY TO LEFT AXILLARY VEIN WITH  INTERPOSITIONAL ARTEGRAFT   performed by Alejandro Donato MD at [address]      GASTROSTOMY TUBE PLACEMENT N/A 5/10/2022    PERCUTANEOUS ENDOSCOPIC GASTROSTOMY TUBE PLACEMENT, performed by Danisha Mukherjee MD at [address] Right 2019    INSERTION OF RIGHT INTERNAL JUGULAR HEMODIALYSIS CATHETER performed by Alejandro Donato MD at [address] [facility]  2018    1.  Moderately increased stainable iron identified by special stain in Kupffer cells and occasional hepatocytes. Negative for evidence of significant portal or lobular inflammation. Negative for evidence of significant fibrosis    AK COLONOSCOPY W/BIOPSY SINGLE/MULTIPLE N/A 10/20/2017    Dr Jackie Montanez prep-Tubular AP (-) dysplasia x 2--3 yr recall    AK EGD TRANSORAL BIOPSY SINGLE/MULTIPLE N/A 2016    Dr Florecita Griffiths EGD TRANSORAL BIOPSY SINGLE/MULTIPLE N/A 10/20/2017    Dr Lovell Half (-)    TRACHEOSTOMY  5/10/2022    TRACHEOSTOMY PERCUTANEOUS performed by Ky Gupta MD at [address] VASCULAR SURGERY Left 2016    fistula by Dr Ela Dc at [address]  10/02/2017    SJS. Left upper fistulograms/venograms, balloon angioplasty cephalic vein arch [id number] conquest.    VASCULAR SURGERY  2018    FISTULOGRAM    VASCULAR SURGERY  10/29/2018    SJS. Left upper fistulograms/venograms    VASCULAR SURGERY  2018    SJS. Arch aortogram,left upper arteriograms.  VASCULAR SURGERY  2019    [facility]. Removal of tunneled dialyis catheter right internal jugular vein.  VASCULAR SURGERY  2019    [facility]. Left upper extremity fistulogram including venography to the superior vena cava. Balloon angioplasty left subclavian vein with 10mm x 40mm conquest balloon. Balloon angioplasty mid/proximal upper arm cephalic vein with 89OJ x 40mm conquest balloon. Venograms after balloon angioplasty. Stent left mid/proximal upper arm cephalic vein stenosis fluency 10mm x 60mm self-expanding covered stent. Balloon     VASCULAR SURGERY  2019    cont angioplasty stent with 10mm x 40mm conquest balloon. Completion venograms left upper extremity.        Family History   Problem Relation Age of Onset    Colon Cancer Mother          at 63    Colon Polyps Mother     Lung Cancer Father          at 66    Colon Polyps Father     Stomach Cancer Sister     Breast Cancer Sister 27    Depression Daughter 25 committed suicide    Liver Cancer Neg Hx     Liver Disease Neg Hx     Esophageal Cancer Neg Hx     Rectal Cancer Neg Hx        Social History     Socioeconomic History    Marital status: Single     Spouse name: Not on file    Number of children: 2    Years of education: Not on file    Highest education level: Not on file   Occupational History    Not on file   Tobacco Use    Smoking status: Current Every Day Smoker     Packs/day: 0.50     Years: 33.00     Pack years: 16.50     Types: Cigarettes    Smokeless tobacco: Never Used    Tobacco comment: NOW at 1/4 PD, started at age 24 and has averaged 2 PPD   Vaping Use    Vaping Use: Never used   Substance and Sexual Activity    Alcohol use: No    Drug use: Not Currently     Types: Marijuana Paullette Nim)    Sexual activity: Not Currently     Partners: Male   Other Topics Concern    Not on file   Social History Narrative    Not on file     Social Determinants of Health     Financial Resource Strain:     Difficulty of Paying Living Expenses: Not on file   Food Insecurity:     Worried About Running Out of Food in the Last Year: Not on file    Kendy of Food in the Last Year: Not on file   Transportation Needs:     Lack of Transportation (Medical): Not on file    Lack of Transportation (Non-Medical):  Not on file   Physical Activity:     Days of Exercise per Week: Not on file    Minutes of Exercise per Session: Not on file   Stress:     Feeling of Stress : Not on file   Social Connections:     Frequency of Communication with Friends and Family: Not on file    Frequency of Social Gatherings with Friends and Family: Not on file    Attends Judaism Services: Not on file    Active Member of Clubs or Organizations: Not on file    Attends Club or Organization Meetings: Not on file    Marital Status: Not on file   Intimate Partner Violence:     Fear of Current or Ex-Partner: Not on file    Emotionally Abused: Not on file    Physically Abused: Not on file    Sexually Abused: Not on file   Housing Stability:     Unable to Pay for Housing in the Last Year: Not on file    Number of Places Lived in the Last Year: Not on file    Unstable Housing in the Last Year: Not on file       Current Facility-Administered Medications   Medication Dose Route Frequency Provider Last Rate Last Admin    vancomycin (VANCOCIN) 750 mg in dextrose 5 % 250 mL IVPB  750 mg IntraVENous Once Marisol Cristian, DO        collagenase ointment   Topical Daily Jesús Powell MD        sodium chloride flush 0.9 % injection 5-40 mL  5-40 mL IntraVENous 2 times per day Nati Pope MD        sodium chloride flush 0.9 % injection 5-40 mL  5-40 mL IntraVENous PRN Nati Pope MD        0.9 % sodium chloride infusion   IntraVENous PRN Nati Pope MD        sodium chloride flush 0.9 % injection 5-40 mL  5-40 mL IntraVENous 2 times per day Stephan Phipps MD   10 mL at 05/11/22 0912    sodium chloride flush 0.9 % injection 5-40 mL  5-40 mL IntraVENous PRN Stephan Phipps MD        0.9 % sodium chloride infusion   IntraVENous PRN Stephan Phipps MD        HYDROmorphone HCl PF (DILAUDID) injection 0.25 mg  0.25 mg IntraVENous Q5 Min PRN Stephan Phipps MD        HYDROmorphone HCl PF (DILAUDID) injection 0.5 mg  0.5 mg IntraVENous Q5 Min PRN Stephan Phipps MD        vancomycin Beth Geronimorenée) intermittent dosing (placeholder)   Other RX Henri Pa MD        aminoglycoside intermittent dosing (placeholder)   Other RX Placeholder Nati Pope MD        acetylcysteine (MUCOMYST) 20 % solution 600 mg  600 mg Inhalation BID Nati Pope MD   600 mg at 05/11/22 0653    ipratropium-albuterol (DUONEB) nebulizer solution 1 ampule  1 ampule Inhalation Q4H Nati Pope MD   1 ampule at 05/11/22 1437    insulin glargine (LANTUS) injection vial 20 Units  20 Units SubCUTAneous BID Nati Pope MD   20 Units at 05/11/22 0910    hydrALAZINE (APRESOLINE) injection 10 mg  10 mg IntraVENous Q6H PRN Damon Lin MD   10 mg at 05/11/22 2965    aspirin chewable tablet 81 mg  81 mg Oral Daily Damon Lin MD   81 mg at 05/11/22 0910    racepinephrine HCl (VAPONEFPRIN) 2.25 % nebulizer solution NEBU 11.25 mg  11.25 mg Nebulization Q4H PRN Damon Lin MD   11.25 mg at 05/03/22 1408    sodium chloride nebulizer 0.9 % solution 3 mL  3 mL Nebulization Q4H PRN Damon Lin MD        propofol injection  5-50 mcg/kg/min IntraVENous Continuous Damon Lin MD 10.8 mL/hr at 05/11/22 0911 30 mcg/kg/min at 05/11/22 0911    heparin (porcine) injection 5,000 Units  5,000 Units SubCUTAneous TID Damon Lin MD   5,000 Units at 05/11/22 [email or web address]   Does not apply PRN Damon Lin MD        dexmedetomidine (PRECEDEX) 400 mcg in sodium chloride 0.9 % 100 mL infusion  0.1-1.5 mcg/kg/hr IntraVENous Continuous Damon Lin MD   Stopped at 05/03/22 1143    menthol-zinc oxide (CALMOSEPTINE) 0.44-20.6 % ointment   Topical BID Rafa Leigh MD   Given at 05/11/22 0553    sodium phosphate 10.5 mmol in sodium chloride 0.9 % 250 mL IVPB  0.16 mmol/kg IntraVENous PRN Damon Lin MD        Or    sodium phosphate 21 mmol in sodium chloride 0.9 % 250 mL IVPB  0.32 mmol/kg IntraVENous PRN Damon Lin MD        insulin lispro (HUMALOG) injection vial 0-12 Units  0-12 Units SubCUTAneous TID WC Damon Lin MD   2 Units at 05/11/22 0910    insulin lispro (HUMALOG) injection vial 0-6 Units  0-6 Units SubCUTAneous Nightly Damon Lin MD   1 Units at 05/09/22 2119    hydrALAZINE (APRESOLINE) tablet 100 mg  100 mg Oral 3 times per day Damon Lin MD   100 mg at 05/11/22 1433    glucagon (rDNA) injection 1 mg  1 mg IntraMUSCular PRN Damon Lin MD        dextrose 5 % solution  100 mL/hr IntraVENous PRN Damon Lin MD        glucose chewable tablet 16 g  4 tablet Oral PRN Damon Lin MD        dextrose bolus (hypoglycemia) 10% sodium chloride      sodium chloride      propofol 30 mcg/kg/min (05/11/22 0911)    dexmedetomidine HCl in NaCl Stopped (05/03/22 1143)    dextrose      sodium chloride Stopped (05/03/22 2005)        Objective:   BP (!) 190/67   Pulse 96   Temp 97.5 °F (36.4 °C)   Resp 14   Ht 5' 6\" (1.676 m)   Wt 139 lb 3.2 oz (63.1 kg)   SpO2 96%   BMI 22.47 kg/m²     General: no acute distress  HEENT: normocephalic, atraumatic  Lungs: faint rhonchi  Cardiovascular: s1 and s2 normal  Abdomen: soft, positive bowel sounds  Extremities: no cyanosis   Neuro: trached and sedated    Recent Labs     05/09/22  0130 05/10/22  0230 05/11/22  0432   WBC 11.7* 7.2 10.4   RBC 2.88* 2.97* 3.24*   HGB 8.1* 8.4* 9.3*   HCT 27.5* 28.5* 30.6*   MCV 95.5 96.0 94.4   MCH 28.1 28.3 28.7   MCHC 29.5* 29.5* 30.4*    262 322     Recent Labs     05/09/22  0130 05/10/22  0230 05/11/22  0432    135* 135*   K 4.6 4.4 5.2*   ANIONGAP 23* 21* 24*   CL 90* 90* 90*   CO2 23 24 21*   BUN 76* 45* 57*   CREATININE 4.0* 2.8* 3.4*   GLUCOSE 74 197* 96   CALCIUM 8.5* 9.0 9.2     Recent Labs     05/09/22  0130 05/10/22  0230 05/11/22  0432   MG 2.5 2.4 2.6   PHOS 8.3* 6.1* 6.9*     Recent Labs     05/09/22  0130 05/10/22  0230 05/11/22  0432   AST 13 14 15   ALT 6 7 7   BILITOT 0.3 0.3 0.3   ALKPHOS 85 90 101     No results for input(s): PH, PO2, PCO2, HCO3, BE, O2SAT in the last 72 hours. No results for input(s): TROPONINI in the last 72 hours. Recent Labs     05/09/22  0130 05/10/22  0230 05/11/22  0432   INR 1.09 1.00 1.07     No results for input(s): LACTA in the last 72 hours.       Intake/Output Summary (Last 24 hours) at 5/11/2022 1510  Last data filed at 5/11/2022 1200  Gross per 24 hour   Intake 1292.55 ml   Output 2200 ml   Net -907.45 ml     Assessment and Plan:   Acute left MCA infarct  Per MRI brain 4/29/2022  Neurology following  Neurochecks  Meds per neurology     Seizure  Neurology following  AEDs per neurology  Seizure precautions    MRSA/Raoultella ornithinolytica/Proteus vulgaris respiratory positivity  Empiric agents on board per sensitivities     Ventilator dependent acute respiratory failure  Secondary to above processes  Multiple failed SBT  Failed extubation 5/30/2022  S/p trach/PEG 5/10/2022 per Dr. Mega Rodríguez     ESRD on HD  Renal following     Medical noncompliance       C. difficile infection  Completed oral vancomycin course     Tobacco dependence       HHS  Since resolved  History of IDDM2  Meds on board  Follow Accu-Cheks/electrolytes  HbA1c 9.4  Poor control     DVT prophylaxis  Heparin    Dispo: Social work following. Awaiting pre-CERT.     Total critical care time: 44 minutes    Keith Baker MD   5/11/2022 3:10 PM

## 2022-05-11 NOTE — PROGRESS NOTES
Comprehensive Nutrition Assessment    Type and Reason for Visit:  Reassess    Nutrition Recommendations/Plan:   1. Continue to work toward goal rate of EN     Malnutrition Assessment:  Malnutrition Status:  Severe malnutrition (05/11/22 0915)    Context:  Acute Illness     Findings of the 6 clinical characteristics of malnutrition:  Energy Intake:  Mild decrease in energy intake (Comment)  Weight Loss:  No significant weight loss     Body Fat Loss:  No significant body fat loss Buccal region   Muscle Mass Loss:  No significant muscle mass loss Thigh (quadraceps),Temples (temporalis)  Fluid Accumulation:  No significant fluid accumulation Extremities   Strength:  Not Performed    Nutrition Assessment:    PEG and trach have been placed. Propofol remains at 10.8ml/hr. TF has been restarted--Vital High Protein is currently at 20ml/hr with no free water flush. Residuals 0-55ml    Nutrition Related Findings:    PEG, trach, dialysis Wound Type: Deep Tissue Injury       Current Nutrition Intake & Therapies:    Average Meal Intake: NPO  Average Supplements Intake: NPO  Current Tube Feeding (TF) Orders:  · Feeding Route: PEG  · Formula: Peptide Based High Protein  · Schedule: Continuous  · Feeding Regimen: Vital High Protein goal rate 48ml/hr  · Additives/Modulars: None  · Water Flushes: none  · Current TF & Flush Orders Provides: Vital High Protein @ 20ml/hr = 480 kcals with 41g protein, 53g CHO and 401 ml free water  · Goal TF & Flush Orders Provides: Vital High Protein @ 48ml/hr =1152 kcals with 100g protein, 127g CHO and 963ml free water from formula      Anthropometric Measures:  Height: 5' 6\" (167.6 cm)  Ideal Body Weight (IBW): 130 lbs (59 kg)    Admission Body Weight: 165 lb (74.8 kg) (estimated)  Current Body Weight: 139 lb 3.2 oz (63.1 kg), 107.1 % IBW.  Weight Source: Bed Scale  Current BMI (kg/m2): 22.5  Usual Body Weight: 144 lb (65.3 kg) (1/2022)  % Weight Change (Calculated): 0.5  BMI Categories: Normal Weight (BMI 18.5-24. 9)    Estimated Daily Nutrient Needs:  Energy Requirements Based On: Kcal/kg  Weight Used for Energy Requirements: Current  Energy (kcal/day): 7566-0923 kcals (20-25 kcals/kg)  Weight Used for Protein Requirements: Current  Protein (g/day): 90g  Method Used for Fluid Requirements: 1 ml/kcal  Fluid (ml/day): 5275-6947 ml    Nutrition Diagnosis:   · Inadequate oral intake related to acute injury/trauma,impaired respiratory function as evidenced by NPO or clear liquid status due to medical condition,intubation,nutrition support - enteral nutrition      Nutrition Interventions:   Food and/or Nutrient Delivery: Continue NPO,Continue Current Tube Feeding  Nutrition Education/Counseling: No recommendation at this time  Coordination of Nutrition Care: Continue to monitor while inpatient  Plan of Care discussed with: nursing    Goals:  Previous Goal Met: Progressing toward Goal(s)  Goals: Meet at least 75% of estimated needs,Tolerate nutrition support at goal rate       Nutrition Monitoring and Evaluation:   Behavioral-Environmental Outcomes: None Identified  Food/Nutrient Intake Outcomes: Enteral Nutrition Intake/Tolerance  Physical Signs/Symptoms Outcomes: Biochemical Data,Fluid Status or Edema,Weight,Skin,Nutrition Focused Physical Findings    Discharge Planning:     Too soon to determine     Daysi Siegel MS, RD, LD  Contact: [phone]

## 2022-05-11 NOTE — PROGRESS NOTES
Physician Progress Note      Jayden Velázquezr  TRAVIS #:                  [id number]  :                       1970  ADMIT DATE:       2022 12:17 PM  DISCH DATE:  RESPONDING  PROVIDER #:        Ifeanyi Porter          QUERY TEXT:    Patient admitted with seizure. If possible, please document in progress notes   and discharge summary if you are evaluating and /or treating any of the   following: The medical record reflects the following:  Risk Factors: diabetes, mechanical ventilation  Clinical Indicators: per RD assessment-Severe malnutrition in the context of   acute illness with greater than 2% weight loss over 1 week and moderate body   fat loss and moderate muscle mass loss with a BMI of 20.7  Treatment: RD assessment, TF    Thank you,  [facility], CCDS  [phone]    ASPEN Criteria:    https://aspenjournals. onlinelibrary. johnson. com/doi/full/10.1177/061973482753737  5  Options provided:  -- Protein calorie malnutrition severe  -- Underweight with BMI 20.7  -- Other - I will add my own diagnosis  -- Disagree - Not applicable / Not valid  -- Disagree - Clinically unable to determine / Unknown  -- Refer to Clinical Documentation Reviewer    PROVIDER RESPONSE TEXT:    This patient has severe protein calorie malnutrition.     Query created by: Davian Cotton on 2022 12:33 PM      Electronically signed by:  Ifeanyi Porter 2022 4:08 PM

## 2022-05-11 NOTE — PROGRESS NOTES
Palliative Care Progress Note  5/11/2022 8:44 AM    Patient:  Papa Holland  YOB: 1970  Primary Care Physician: ROMINA Aponte  Advance Directive: Full Code  Admit Date: 4/24/2022       Hospital Day: 16  Portions of this note have been copied forward, however, changed to reflect the most current clinical status of this patient. CHIEF COMPLAINT/REASON FOR CONSULTATION Goals of care    SUBJECTIVE:  Ms. Lorrie Marroquin is s/p trach and PEG placement yesterday. Remains sedated    Review of Systems:   14 point review of systems is negative except as specifically addressed above. Objective:   VITALS:  BP (!) 162/66   Pulse 87   Temp 97.9 °F (36.6 °C) (Temporal)   Resp 14   Ht 5' 6\" (1.676 m)   Wt 139 lb 3.2 oz (63.1 kg)   SpO2 98%   BMI 22.47 kg/m²   24HR INTAKE/OUTPUT:      Intake/Output Summary (Last 24 hours) at 5/11/2022 0844  Last data filed at 5/11/2022 0600  Gross per 24 hour   Intake 461.8 ml   Output 0 ml   Net 461.8 ml     General appearance: 48 yo female, chronically ill appearing, trached/sedated, NAD  Head: Normocephalic, without obvious abnormality, atraumatic  Eyes: conjunctivae/corneas clear. Pupils sluggish  Ears: normal external ears and nose  Neck: supple, symmetrical, trachea midline, fresh trach present  Lungs: diminished bilaterally to asucultation, trach in place, mechanically ventilated, equal chest rise  Heart: RRR, S1, S2 normal, no murmur  Abdomen: soft, no grimacing w/ palpation; non-distended, bowel sounds present.  FMS in place with small diarrhea noted   Extremities: trace edema in BUE, No lower extremity edema,  No erythema, no grimacing w/ palpation   Skin: warm, dry, pale  Neurologic: trach/sedated, opens eyes to voice, withdrawals to pain-less on right side    Medications:      sodium chloride      sodium chloride      propofol 30 mcg/kg/min (05/11/22 0154)    dexmedetomidine HCl in NaCl Stopped (05/03/22 8168)    dextrose      sodium chloride Stopped (05/03/22 2005)      vancomycin  750 mg IntraVENous Once    sodium chloride flush  5-40 mL IntraVENous 2 times per day    sodium chloride flush  5-40 mL IntraVENous 2 times per day    vancomycin (VANCOCIN) intermittent dosing (placeholder)   Other RX Placeholder    aminoglycoside intermittent dosing (placeholder)   Other RX Placeholder    acetylcysteine  600 mg Inhalation BID    ipratropium-albuterol  1 ampule Inhalation Q4H    insulin glargine  20 Units SubCUTAneous BID    aspirin  81 mg Oral Daily    heparin (porcine)  5,000 Units SubCUTAneous TID    menthol-zinc oxide   Topical BID    insulin lispro  0-12 Units SubCUTAneous TID WC    insulin lispro  0-6 Units SubCUTAneous Nightly    hydrALAZINE  100 mg Oral 3 times per day    sodium chloride flush  5-40 mL IntraVENous 2 times per day    levetiracetam  500 mg IntraVENous Q12H    chlorhexidine  15 mL Mouth/Throat BID    famotidine (PEPCID) injection  20 mg IntraVENous Daily     sodium chloride flush, sodium chloride, sodium chloride flush, sodium chloride, HYDROmorphone, HYDROmorphone, hydrALAZINE, racepinephrine HCl, sodium chloride nebulizer, Peppermint Spirit, sodium phosphate IVPB **OR** sodium phosphate IVPB, glucagon (rDNA), dextrose, glucose, dextrose bolus **OR** dextrose bolus, sodium chloride flush, sodium chloride, LORazepam, ondansetron **OR** ondansetron, polyethylene glycol, acetaminophen **OR** acetaminophen, magic butt cream, magnesium sulfate, potassium alternative oral replacement **OR** potassium chloride  Diet NPO Exceptions are: Sips of Water with Meds  ADULT TUBE FEEDING; PEG; Peptide Based High Protein; Continuous; 20; Yes; 10; Q 6 hours; 48; 0; Other (specify); no free water flush     Lab and other Data:     Recent Labs     05/09/22  0130 05/10/22  0230 05/11/22  0432   WBC 11.7* 7.2 10.4   HGB 8.1* 8.4* 9.3*    262 322     Recent Labs     05/09/22  0130 05/10/22  0230 05/11/22  0432    135* 135*   K 4.6 4.4 5.2*   CL 90* 90* 90*   CO2 23 24 21*   BUN 76* 45* 57*   CREATININE 4.0* 2.8* 3.4*   GLUCOSE 74 197* 96     Recent Labs     05/09/22  0130 05/10/22  0230 05/11/22  0432   AST 13 14 15   ALT 6 7 7   BILITOT 0.3 0.3 0.3   ALKPHOS 85 90 101       Assessment/Plan   Principal Problem:    Acute ischemic stroke (HCC)  Active Problems:    Weakness    Severe malnutrition (HCC)    Gastroesophageal reflux disease without esophagitis    Acquired hypothyroidism    Anemia in chronic kidney disease (CKD)    Type 2 diabetes mellitus with chronic kidney disease on chronic dialysis, with long-term current use of insulin (HCC)    Acute encephalopathy    Respiratory failure ([facility]    Uncontrolled type 2 diabetes mellitus with complication, with long-term current use of insulin (HCC)    ESRD (end stage renal disease) on dialysis ([facility]    PVD (peripheral vascular disease) ([facility]    ESRD on hemodialysis ([facility]    Hyponatremia    Hyperglycemia due to type 2 diabetes mellitus ([facility]    Uncontrolled hypertension    Seizure ([facility]    Palliative care patient    Uncontrolled type 2 diabetes mellitus with hyperosmolar nonketotic hyperglycemia (HCC)    Altered mental state    Acute hypoxemic respiratory failure (HCC)    Generalized weakness    UTI (urinary tract infection)    Closed fracture of left distal femur (HCC)    History of infection with vancomycin resistant Enterococcus (VRE)  Resolved Problems:    * No resolved hospital problems. *      Visit Summary:  Chart reviewed, patient discussed with nursing staff. Reviewed health issues, work up and treatment plan as well as factors that lead to hospitalization. Report obtained from RN with no acute events overnight. S/p trach/PEG and pt tolerated well. She remains on sedation and intermittently responsive with vacations. RN has spoken with pts sister, rFeddy Blevins, and updated her on plan of care. SW assisting with [facility] referral. Will follow. Recommendations:   1.  Palliative care: GOC continue all aggressive measures. Sig other previously agreeable to [facility] referral following procedure. Code status: FULL CODE    2. Acute hypoxemic respiratory failure- Extubated 5/3/22 but required reintubation that afternoon. Original intubation date 4/24/22. Now with trach/PEG    3. Acute left MCA infarct- mgmt per neuro. MRI 4/29/22 noted. Echo negative 4/30/22. On ASA. Supportive care    4. Hyperglycemia w/ Hx of DM II-mgmt per Hospitalist, SSI    5. S/p tonic-clonic seizure-mgmt per Neurology, cEEG discontinued, Keppra 500 mg BID    6. ESRD on HD-Nephrology managing. Dialysis today    7. Hypertension-Hydralazine 100 mg TID, cardene gtt as needed    8. Clostridium difficile/Norovirus-Oral vancomycin completed 5/8/22    9. Pneumonia- follow cultures. IV gentamicin per hospitalist now stopped    Thank you for consulting Palliative Care and allowing us to participate in the care of this patient.    Time Spent Counseling > 50%:  YES                                   Total Time Spent with patient/family counseling, workup/treatment review, counseling and placement of orders/preparation of this note: 19 minutes    Electronically signed by ROMINA Ramirez CNP on 5/11/2022 at 8:44 AM    (Please note that portions of this note were completed with a voice recognition program.  Huong Bailey made to edit the dictations but occasionally words are mis-transcribed.)

## 2022-05-12 LAB
ALBUMIN SERPL-MCNC: 3 G/DL (ref 3.5–5.2)
ALP BLD-CCNC: 100 U/L (ref 35–104)
ALT SERPL-CCNC: 9 U/L (ref 5–33)
ANION GAP SERPL CALCULATED.3IONS-SCNC: 18 MMOL/L (ref 7–19)
AST SERPL-CCNC: 18 U/L (ref 5–32)
BASOPHILS ABSOLUTE: 0 K/UL (ref 0–0.2)
BASOPHILS RELATIVE PERCENT: 0.5 % (ref 0–1)
BILIRUB SERPL-MCNC: 0.3 MG/DL (ref 0.2–1.2)
BUN BLDV-MCNC: 34 MG/DL (ref 6–20)
CALCIUM SERPL-MCNC: 9 MG/DL (ref 8.6–10)
CHLORIDE BLD-SCNC: 93 MMOL/L (ref 98–111)
CO2: 24 MMOL/L (ref 22–29)
CREAT SERPL-MCNC: 2.4 MG/DL (ref 0.5–0.9)
EOSINOPHILS ABSOLUTE: 0.1 K/UL (ref 0–0.6)
EOSINOPHILS RELATIVE PERCENT: 1.2 % (ref 0–5)
GFR AFRICAN AMERICAN: 26
GFR NON-AFRICAN AMERICAN: 21
GLUCOSE BLD-MCNC: 140 MG/DL (ref 70–99)
GLUCOSE BLD-MCNC: 152 MG/DL (ref 70–99)
GLUCOSE BLD-MCNC: 222 MG/DL (ref 70–99)
GLUCOSE BLD-MCNC: 233 MG/DL (ref 70–99)
GLUCOSE BLD-MCNC: 96 MG/DL (ref 74–109)
HCT VFR BLD CALC: 29.6 % (ref 37–47)
HEMOGLOBIN: 8.9 G/DL (ref 12–16)
IMMATURE GRANULOCYTES #: 0 K/UL
INR BLD: 1.12 (ref 0.88–1.18)
LYMPHOCYTES ABSOLUTE: 0.9 K/UL (ref 1.1–4.5)
LYMPHOCYTES RELATIVE PERCENT: 12.1 % (ref 20–40)
MAGNESIUM: 2.4 MG/DL (ref 1.6–2.6)
MCH RBC QN AUTO: 28.8 PG (ref 27–31)
MCHC RBC AUTO-ENTMCNC: 30.1 G/DL (ref 33–37)
MCV RBC AUTO: 95.8 FL (ref 81–99)
MONOCYTES ABSOLUTE: 0.7 K/UL (ref 0–0.9)
MONOCYTES RELATIVE PERCENT: 9.3 % (ref 0–10)
NEUTROPHILS ABSOLUTE: 5.9 K/UL (ref 1.5–7.5)
NEUTROPHILS RELATIVE PERCENT: 76.6 % (ref 50–65)
PDW BLD-RTO: 19.1 % (ref 11.5–14.5)
PERFORMED ON: ABNORMAL
PHOSPHORUS: 5 MG/DL (ref 2.5–4.5)
PLATELET # BLD: 322 K/UL (ref 130–400)
PMV BLD AUTO: 9.8 FL (ref 9.4–12.3)
POTASSIUM SERPL-SCNC: 4 MMOL/L (ref 3.5–5)
PROTHROMBIN TIME: 14.4 SEC (ref 12–14.6)
RBC # BLD: 3.09 M/UL (ref 4.2–5.4)
SODIUM BLD-SCNC: 135 MMOL/L (ref 136–145)
TOTAL PROTEIN: 6 G/DL (ref 6.6–8.7)
WBC # BLD: 7.7 K/UL (ref 4.8–10.8)

## 2022-05-12 PROCEDURE — 6370000000 HC RX 637 (ALT 250 FOR IP): Performed by: SURGERY

## 2022-05-12 PROCEDURE — 6360000002 HC RX W HCPCS: Performed by: SURGERY

## 2022-05-12 PROCEDURE — 85025 COMPLETE CBC W/AUTO DIFF WBC: CPT

## 2022-05-12 PROCEDURE — 36415 COLL VENOUS BLD VENIPUNCTURE: CPT

## 2022-05-12 PROCEDURE — 83735 ASSAY OF MAGNESIUM: CPT

## 2022-05-12 PROCEDURE — 94003 VENT MGMT INPAT SUBQ DAY: CPT

## 2022-05-12 PROCEDURE — 99232 SBSQ HOSP IP/OBS MODERATE 35: CPT

## 2022-05-12 PROCEDURE — 2100000000 HC CCU R&B

## 2022-05-12 PROCEDURE — 99232 SBSQ HOSP IP/OBS MODERATE 35: CPT | Performed by: PSYCHIATRY & NEUROLOGY

## 2022-05-12 PROCEDURE — 2500000003 HC RX 250 WO HCPCS: Performed by: SURGERY

## 2022-05-12 PROCEDURE — 84100 ASSAY OF PHOSPHORUS: CPT

## 2022-05-12 PROCEDURE — 2580000003 HC RX 258: Performed by: SURGERY

## 2022-05-12 PROCEDURE — 82947 ASSAY GLUCOSE BLOOD QUANT: CPT

## 2022-05-12 PROCEDURE — 2580000003 HC RX 258: Performed by: ANESTHESIOLOGY

## 2022-05-12 PROCEDURE — 85610 PROTHROMBIN TIME: CPT

## 2022-05-12 PROCEDURE — 2700000000 HC OXYGEN THERAPY PER DAY

## 2022-05-12 PROCEDURE — 80053 COMPREHEN METABOLIC PANEL: CPT

## 2022-05-12 PROCEDURE — 94640 AIRWAY INHALATION TREATMENT: CPT

## 2022-05-12 RX ADMIN — CHLORHEXIDINE GLUCONATE 15 ML: 1.2 RINSE ORAL at 09:12

## 2022-05-12 RX ADMIN — HYDRALAZINE HYDROCHLORIDE 100 MG: 50 TABLET, FILM COATED ORAL at 21:14

## 2022-05-12 RX ADMIN — ACETYLCYSTEINE 600 MG: 200 SOLUTION ORAL; RESPIRATORY (INHALATION) at 18:09

## 2022-05-12 RX ADMIN — IPRATROPIUM BROMIDE AND ALBUTEROL SULFATE 1 AMPULE: 2.5; .5 SOLUTION RESPIRATORY (INHALATION) at 20:00

## 2022-05-12 RX ADMIN — INSULIN LISPRO 4 UNITS: 100 INJECTION, SOLUTION INTRAVENOUS; SUBCUTANEOUS at 09:11

## 2022-05-12 RX ADMIN — INSULIN LISPRO 1 UNITS: 100 INJECTION, SOLUTION INTRAVENOUS; SUBCUTANEOUS at 21:15

## 2022-05-12 RX ADMIN — ANORECTAL OINTMENT: 15.7; .44; 24; 20.6 OINTMENT TOPICAL at 18:08

## 2022-05-12 RX ADMIN — IPRATROPIUM BROMIDE AND ALBUTEROL SULFATE 1 AMPULE: 2.5; .5 SOLUTION RESPIRATORY (INHALATION) at 15:39

## 2022-05-12 RX ADMIN — PROPOFOL 30 MCG/KG/MIN: 10 INJECTION, EMULSION INTRAVENOUS at 12:53

## 2022-05-12 RX ADMIN — IPRATROPIUM BROMIDE AND ALBUTEROL SULFATE 1 AMPULE: 2.5; .5 SOLUTION RESPIRATORY (INHALATION) at 21:50

## 2022-05-12 RX ADMIN — INSULIN LISPRO 4 UNITS: 100 INJECTION, SOLUTION INTRAVENOUS; SUBCUTANEOUS at 12:14

## 2022-05-12 RX ADMIN — Medication 10 ML: at 09:12

## 2022-05-12 RX ADMIN — SODIUM CHLORIDE, PRESERVATIVE FREE 20 MG: 5 INJECTION INTRAVENOUS at 09:11

## 2022-05-12 RX ADMIN — IPRATROPIUM BROMIDE AND ALBUTEROL SULFATE 1 AMPULE: 2.5; .5 SOLUTION RESPIRATORY (INHALATION) at 01:53

## 2022-05-12 RX ADMIN — LEVETIRACETAM 500 MG: 5 INJECTION INTRAVENOUS at 01:08

## 2022-05-12 RX ADMIN — PROPOFOL 30 MCG/KG/MIN: 10 INJECTION, EMULSION INTRAVENOUS at 04:28

## 2022-05-12 RX ADMIN — HEPARIN SODIUM 5000 UNITS: 5000 INJECTION INTRAVENOUS; SUBCUTANEOUS at 09:11

## 2022-05-12 RX ADMIN — HYDRALAZINE HYDROCHLORIDE 100 MG: 50 TABLET, FILM COATED ORAL at 05:55

## 2022-05-12 RX ADMIN — ASPIRIN 81 MG 81 MG: 81 TABLET ORAL at 09:12

## 2022-05-12 RX ADMIN — IPRATROPIUM BROMIDE AND ALBUTEROL SULFATE 1 AMPULE: 2.5; .5 SOLUTION RESPIRATORY (INHALATION) at 10:32

## 2022-05-12 RX ADMIN — IPRATROPIUM BROMIDE AND ALBUTEROL SULFATE 1 AMPULE: 2.5; .5 SOLUTION RESPIRATORY (INHALATION) at 18:09

## 2022-05-12 RX ADMIN — COLLAGENASE SANTYL: 250 OINTMENT TOPICAL at 09:12

## 2022-05-12 RX ADMIN — HYDRALAZINE HYDROCHLORIDE 100 MG: 50 TABLET, FILM COATED ORAL at 14:39

## 2022-05-12 RX ADMIN — Medication 10 ML: at 21:15

## 2022-05-12 RX ADMIN — HEPARIN SODIUM 5000 UNITS: 5000 INJECTION INTRAVENOUS; SUBCUTANEOUS at 21:14

## 2022-05-12 RX ADMIN — HEPARIN SODIUM 5000 UNITS: 5000 INJECTION INTRAVENOUS; SUBCUTANEOUS at 14:39

## 2022-05-12 RX ADMIN — CHLORHEXIDINE GLUCONATE 15 ML: 1.2 RINSE ORAL at 21:16

## 2022-05-12 RX ADMIN — INSULIN GLARGINE 20 UNITS: 100 INJECTION, SOLUTION SUBCUTANEOUS at 09:11

## 2022-05-12 RX ADMIN — PROPOFOL 30 MCG/KG/MIN: 10 INJECTION, EMULSION INTRAVENOUS at 22:40

## 2022-05-12 RX ADMIN — IPRATROPIUM BROMIDE AND ALBUTEROL SULFATE 1 AMPULE: 2.5; .5 SOLUTION RESPIRATORY (INHALATION) at 06:49

## 2022-05-12 RX ADMIN — ACETYLCYSTEINE 600 MG: 200 SOLUTION ORAL; RESPIRATORY (INHALATION) at 06:49

## 2022-05-12 RX ADMIN — LEVETIRACETAM 500 MG: 5 INJECTION INTRAVENOUS at 12:18

## 2022-05-12 RX ADMIN — INSULIN GLARGINE 20 UNITS: 100 INJECTION, SOLUTION SUBCUTANEOUS at 21:15

## 2022-05-12 ASSESSMENT — PULMONARY FUNCTION TESTS
PIF_VALUE: 23
PIF_VALUE: 18
PIF_VALUE: 23
PIF_VALUE: 22
PIF_VALUE: 21
PIF_VALUE: 18
PIF_VALUE: 28
PIF_VALUE: 18
PIF_VALUE: 20
PIF_VALUE: 19
PIF_VALUE: 20
PIF_VALUE: 18
PIF_VALUE: 17
PIF_VALUE: 18
PIF_VALUE: 20
PIF_VALUE: 18
PIF_VALUE: 21
PIF_VALUE: 24
PIF_VALUE: 17
PIF_VALUE: 19
PIF_VALUE: 19
PIF_VALUE: 18

## 2022-05-12 ASSESSMENT — PAIN SCALES - GENERAL
PAINLEVEL_OUTOF10: 0
PAINLEVEL_OUTOF10: 1
PAINLEVEL_OUTOF10: 1

## 2022-05-12 NOTE — PROGRESS NOTES
Nephrology ([facility])  Progress Note    Patient:  Paulette Jim  YOB: 1970  Date of Service: 5/12/2022  MRN: [id number]   Acct: [de-identified]   Primary Care Physician: Claudene Marshall, APRN  Advance Directive: Full Code  Admit Date: 4/24/2022       Hospital Day: 25  Referring Provider: Kalen Cuevas MD    Patient Seen, Chart, Consults, Notes, Labs, Radiology studies reviewed. Subjective:  Paulette Jim is a 47 y.o. female for whom we were consulted for evaluation and treatment of end-stage renal disease.  Patient also has a history of seizure disorder, type 2 diabetes, hypertension, frequent hospitalization with noncompliance and poorly controlled diabetes.  Patient was brought to the emergency room after she was found to have unresponsiveness and witnessed grand mal seizure. Fernandez Riddle was brought in by ambulance.  On arrival in the emergency room, she was found to be severely hyperglycemic and had a urinary tract infection.  Patient was intubated, sedated and treated by neurology for grand mal seizure. Fernandez Riddle was also receiving IV antibiotics and insulin drip. Santiago Del Castillo blood sugar control had significantly improved.  On April 25, she has received emergent hemodialysis treatment for correction of volume status as well as hyponatremia.  She was moved from ICU to CCU. Patient remains on the ventilator and unable to participate in the history and physical examination. She is s/p trach and PEG placement on 5/10. Events reviewed with nursing at the bedside. She is s/p dialysis on 5/11 .       Allergies:  Penicillins, Lamisil advanced [terbinafine], Reglan [metoclopramide], and Stadol [butorphanol]    Medicines:  Current Facility-Administered Medications   Medication Dose Route Frequency Provider Last Rate Last Admin    collagenase ointment   Topical Daily Kalen Cuevas MD   Given at 05/12/22 0912    sodium chloride flush 0.9 % injection 5-40 mL  5-40 mL IntraVENous 2 times per day Ludin Daria Johnson Tirado MD        sodium chloride flush 0.9 % injection 5-40 mL  5-40 mL IntraVENous PRN Hayden Camarillo MD        0.9 % sodium chloride infusion   IntraVENous PRN Hayden Camarillo MD        sodium chloride flush 0.9 % injection 5-40 mL  5-40 mL IntraVENous 2 times per day Shemar Scruggs MD   10 mL at 05/11/22 0912    sodium chloride flush 0.9 % injection 5-40 mL  5-40 mL IntraVENous PRN Shemar Scruggs MD        0.9 % sodium chloride infusion   IntraVENous PRN Shemar Scruggs MD        HYDROmorphone HCl PF (DILAUDID) injection 0.25 mg  0.25 mg IntraVENous Q5 Min PRN Shemar Scruggs MD        HYDROmorphone HCl PF (DILAUDID) injection 0.5 mg  0.5 mg IntraVENous Q5 Min PRN Shemar Scruggs MD        vancomycin Suezanne Rising) intermittent dosing (placeholder)   Other RX Placeholder Hayden Camarillo MD        aminoglycoside intermittent dosing (placeholder)   Other RX Placeholder Hayden Camarillo MD        acetylcysteine (MUCOMYST) 20 % solution 600 mg  600 mg Inhalation BID Hayden Camarillo MD   600 mg at 05/12/22 0649    ipratropium-albuterol (DUONEB) nebulizer solution 1 ampule  1 ampule Inhalation Q4H Hayden Camarillo MD   1 ampule at 05/12/22 1032    insulin glargine (LANTUS) injection vial 20 Units  20 Units SubCUTAneous BID Hayden Camarillo MD   20 Units at 05/12/22 0911    hydrALAZINE (APRESOLINE) injection 10 mg  10 mg IntraVENous Q6H PRN Hayden Camarillo MD   10 mg at 05/11/22 1820    aspirin chewable tablet 81 mg  81 mg Oral Daily Hayden Camarillo MD   81 mg at 05/12/22 0912    racepinephrine HCl (VAPONEFPRIN) 2.25 % nebulizer solution NEBU 11.25 mg  11.25 mg Nebulization Q4H PRN Hayden Camarillo MD   11.25 mg at 05/03/22 1408    sodium chloride nebulizer 0.9 % solution 3 mL  3 mL Nebulization Q4H PRN Hayden Camarillo MD        propofol injection  5-50 mcg/kg/min IntraVENous Continuous Hayden Camarillo MD 10.8 mL/hr at 05/12/22 1000 30 mcg/kg/min at 05/12/22 1000    heparin (porcine) injection 5,000 Units 5,000 Units SubCUTAneous TID Job Cárdenas MD   5,000 Units at 05/12/22 7258    Peppermint Spirit SPRT   Does not apply PRN Job Cárdenas MD        dexmedetomidine [facility]) 400 mcg in sodium chloride 0.9 % 100 mL infusion  0.1-1.5 mcg/kg/hr IntraVENous Continuous Job Cárdenas MD   Stopped at 05/03/22 1143    menthol-zinc oxide (CALMOSEPTINE) 0.44-20.6 % ointment   Topical BID Jeanna Pabon MD   Given at 05/11/22 1646    sodium phosphate 10.5 mmol in sodium chloride 0.9 % 250 mL IVPB  0.16 mmol/kg IntraVENous PRN oJb Cárdenas MD        Or    sodium phosphate 21 mmol in sodium chloride 0.9 % 250 mL IVPB  0.32 mmol/kg IntraVENous PRN Job Cárdenas MD        insulin lispro (HUMALOG) injection vial 0-12 Units  0-12 Units SubCUTAneous TID WC Job Cárdenas MD   4 Units at 05/12/22 1214    insulin lispro (HUMALOG) injection vial 0-6 Units  0-6 Units SubCUTAneous Nightly Job Cárdenas MD   1 Units at 05/09/22 2119    hydrALAZINE (APRESOLINE) tablet 100 mg  100 mg Oral 3 times per day Job Cárdenas MD   100 mg at 05/12/22 0555    glucagon (rDNA) injection 1 mg  1 mg IntraMUSCular PRN Job Cárdenas MD        dextrose 5 % solution  100 mL/hr IntraVENous PRN Job Cárdenas MD        glucose chewable tablet 16 g  4 tablet Oral PRN Job Cárdenas MD        dextrose bolus (hypoglycemia) 10% 125 mL  125 mL IntraVENous DESMOND Cárdenas MD   Stopped at 05/11/22 2016    Or    dextrose bolus (hypoglycemia) 10% 250 mL  250 mL IntraVENous PRN Job Cárdenas MD   Stopped at 05/10/22 2026    sodium chloride flush 0.9 % injection 5-40 mL  5-40 mL IntraVENous 2 times per day Job Cárdenas MD   10 mL at 05/12/22 0912    sodium chloride flush 0.9 % injection 5-40 mL  5-40 mL IntraVENous PRN Job Cárdenas MD        0.9 % sodium chloride infusion   IntraVENous PRN Job Cárdenas MD   Stopped at 05/03/22 2005    LORazepam (ATIVAN) injection 1 mg  1 mg IntraVENous Q5 Min PRN Job Cárdenas MD  ondansetron (ZOFRAN-ODT) disintegrating tablet 4 mg  4 mg Oral Q8H PRN Kurtis Day MD        Or    ondansetron [facility]) injection 4 mg  4 mg IntraVENous Q6H PRN Kurtis Day MD        polyethylene glycol [facility]) packet 17 g  17 g Oral Daily PRN Kurtis Day MD        acetaminophen (TYLENOL) tablet 650 mg  650 mg Oral Q6H PRN Kurtis Day MD   650 mg at 04/25/22 0035    Or    acetaminophen (TYLENOL) suppository 650 mg  650 mg Rectal Q6H PRN Kurtis Day MD        levETIRAcetam (KEPPRA) 500 mg/100 mL IVPB  500 mg IntraVENous Q12H Kurtis Day  mL/hr at 05/12/22 1218 500 mg at 05/12/22 1218    chlorhexidine (PERIDEX) 0.12 % solution 15 mL  15 mL Mouth/Throat BID Kurtis Day MD   15 mL at 05/12/22 0912    famotidine (PEPCID) 20 mg in sodium chloride (PF) 10 mL injection  20 mg IntraVENous Daily Kurtis Day MD   20 mg at 05/12/22 2114    magic butt cream   Topical Q4H PRN Kurtis Day MD   Given at 05/03/22 0940    magnesium sulfate 1000 mg in dextrose 5% 100 mL IVPB  1,000 mg IntraVENous PRN Kurtis Day MD        potassium bicarb-citric acid (EFFER-K) effervescent tablet 40 mEq  40 mEq Oral PRN Kurtis Day MD        Or    potassium chloride 10 mEq/100 mL IVPB (Peripheral Line)  10 mEq IntraVENous PRN Kurtis Day MD   Stopped at 04/25/22 2063       Past Medical History:  Past Medical History:   Diagnosis Date    Acute ischemic stroke ([facility] 4/30/2022    Arthritis     Chronic kidney disease, stage 5, kidney failure (HCC)     Closed fracture of right shoulder girdle, with routine healing, subsequent encounter     DM (diabetes mellitus) type II controlled with renal manifestation ([facility] 06/1993    Gastroparesis     GERD (gastroesophageal reflux disease)     Hemodialysis patient ([facility]     dialysis on tues, thur, and sat at [facility]    Hypertension     Hypothyroid     Nasal congestion     recent    Neuropathy     Noncompliance with medications     Palliative care patient 10/08/2019    Restless leg syndrome        Past Surgical History:  Past Surgical History:   Procedure Laterality Date    BREAST SURGERY Left 2008    infected milk gland removed    BREAST SURGERY Right 5/25/2021    WEDGE EXCISION RIGHT BREAST DUCT WITH LACRIMAL DUCT PROBE, PEC BLOCK performed by Minh Denis MD at [facility] [place] [place] [facility], LAPAROSCOPIC      2008    COLONOSCOPY Left 9/17/2020    Dr Berto Vaughn hepatic flexure-Severe tortuosity with severe spasming, 1 yr recall    DIALYSIS FISTULA CREATION Left 1/25/2019    REVISION LEFT UPPER EXTREMITY AV ACCESS WITH LEFT BRACHIAL ARTERY TO LEFT AXILLARY VEIN WITH  INTERPOSITIONAL ARTEGRAFT   performed by Leroy Najera MD at [address]  2012    GASTROSTOMY TUBE PLACEMENT N/A 5/10/2022    PERCUTANEOUS ENDOSCOPIC GASTROSTOMY TUBE PLACEMENT, performed by Peggy Mar MD at [address] Right 1/25/2019    INSERTION OF RIGHT INTERNAL JUGULAR HEMODIALYSIS CATHETER performed by Leroy Najera MD at [facility]  08/17/2018    1.  Moderately increased stainable iron identified by special stain in Kupffer cells and occasional hepatocytes. Negative for evidence of significant portal or lobular inflammation. Negative for evidence of significant fibrosis    ND COLONOSCOPY W/BIOPSY SINGLE/MULTIPLE N/A 10/20/2017    Dr Von Caruso prep-Tubular AP (-) dysplasia x 2--3 yr recall    ND EGD TRANSORAL BIOPSY SINGLE/MULTIPLE N/A 12/27/2016    Dr Heena Trejo EGD TRANSORAL BIOPSY SINGLE/MULTIPLE N/A 10/20/2017    Dr Ernie Diaz (-)    TRACHEOSTOMY  5/10/2022    TRACHEOSTOMY PERCUTANEOUS performed by Peggy Mar MD at [facility] VASCULAR SURGERY Left 2016    fistula by Dr Wandy Fink at [address]  10/02/2017    [facility]. Left upper fistulograms/venograms, balloon angioplasty cephalic vein arch 19M54 conquest.    VASCULAR SURGERY 2018    FISTULOGRAM    VASCULAR SURGERY  10/29/2018    [facility]. Left upper fistulograms/venograms    VASCULAR SURGERY  2018    [facility]. Arch aortogram,left upper arteriograms.  VASCULAR SURGERY  2019    [facility]. Removal of tunneled dialyis catheter right internal jugular vein.  VASCULAR SURGERY  2019    [facility]. Left upper extremity fistulogram including venography to the superior vena cava. Balloon angioplasty left subclavian vein with 10mm x 40mm conquest balloon. Balloon angioplasty mid/proximal upper arm cephalic vein with 75MI x 40mm conquest balloon. Venograms after balloon angioplasty. Stent left mid/proximal upper arm cephalic vein stenosis fluency 10mm x 60mm self-expanding covered stent. Balloon     VASCULAR SURGERY  2019    cont angioplasty stent with 10mm x 40mm conquest balloon. Completion venograms left upper extremity.        Family History  Family History   Problem Relation Age of Onset    Colon Cancer Mother          at 63    Colon Polyps Mother     Lung Cancer Father          at 79    Colon Polyps Father     Stomach Cancer Sister     Breast Cancer Sister 27    Depression Daughter 25        committed suicide    Liver Cancer Neg Hx     Liver Disease Neg Hx     Esophageal Cancer Neg Hx     Rectal Cancer Neg Hx        Social History  Social History     Socioeconomic History    Marital status: Single     Spouse name: Not on file    Number of children: 2    Years of education: Not on file    Highest education level: Not on file   Occupational History    Not on file   Tobacco Use    Smoking status: Current Every Day Smoker     Packs/day: 0.50     Years: 33.00     Pack years: 16.50     Types: Cigarettes    Smokeless tobacco: Never Used    Tobacco comment: NOW at 1/4 PD, started at age 24 and has averaged 2 PPD   Vaping Use    Vaping Use: Never used   Substance and Sexual Activity    Alcohol use: No    Drug use: Not Currently     Types: Marijuana Rhea Loulou)    Sexual activity: Not Currently     Partners: Male   Other Topics Concern    Not on file   Social History Narrative    Not on file     Social Determinants of Health     Financial Resource Strain:     Difficulty of Paying Living Expenses: Not on file   Food Insecurity:     Worried About Running Out of Food in the Last Year: Not on file    Kendy of Food in the Last Year: Not on file   Transportation Needs:     Lack of Transportation (Medical): Not on file    Lack of Transportation (Non-Medical): Not on file   Physical Activity:     Days of Exercise per Week: Not on file    Minutes of Exercise per Session: Not on file   Stress:     Feeling of Stress : Not on file   Social Connections:     Frequency of Communication with Friends and Family: Not on file    Frequency of Social Gatherings with Friends and Family: Not on file    Attends Hindu Services: Not on file    Active Member of [address] [place] [address] [employer] or Organizations: Not on file    Attends Club or Organization Meetings: Not on file    Marital Status: Not on file   Intimate Partner Violence:     Fear of Current or Ex-Partner: Not on file    Emotionally Abused: Not on file    Physically Abused: Not on file    Sexually Abused: Not on file   Housing Stability:     Unable to Pay for Housing in the Last Year: Not on file    Number of [place] in the Last Year: Not on file    Unstable Housing in the Last Year: Not on file         Review of Systems:  Reviewed with the patient at the bedside, 6 points reviewed and negative except as noted above. Objective:  Blood pressure (!) 148/63, pulse 98, temperature 98.4 °F (36.9 °C), temperature source Temporal, resp. rate 14, height 5' 6\" (1.676 m), weight 137 lb 8 oz (62.4 kg), SpO2 94 %.     Intake/Output Summary (Last 24 hours) at 5/12/2022 1222  Last data filed at 5/12/2022 1000  Gross per 24 hour   Intake 1718.95 ml   Output 200 ml   Net 1518.95 ml     General: trached, sedated  Chest: bilateral air entry, decreased input(s): BC, BLOODCULT2 in the last 72 hours. No results for input(s): CXSURG in the last 72 hours. Radiology reports as per the Radiologist  Radiology: CT Head WO Contrast    Result Date: 4/24/2022  CT HEAD WO CONTRAST 4/24/2022 2:01 PM HISTORY: Altered mental status COMPARISON: CT scan dated 11/18/2021 DOSE LENGTH PRODUCT: 766 mGy cm TECHNIQUE: Helical tomographic images of the brain were obtained without the use of intravenous contrast. Automated exposure control was also utilized to decrease patient radiation dose. FINDINGS: There is no evidence of evolving large vascular territory infarct. No visualized intra-axial or extra-axial hemorrhage. No mass lesion is identified. Normal size and configuration of the ventricular system. The basal cisterns are symmetric. Posterior fossa structures are unremarkable. The included orbits and their contents are unremarkable. Chronic paranasal sinus disease on the left. The visualized osseous structures and overlying soft tissues of the skull and face are unremarkable. 1. No acute intracranial process. Signed by Dr Yaneli Ly    Result Date: 4/24/2022  XR CHEST PORTABLE 4/24/2022 4:27 PM HISTORY: ET tube, enteric tube  Technique: Single AP view of the chest COMPARISONS: 4/24/2022 FINDINGS: Endotracheal tube is identified with tip essentially touching the krysta. Enteric tube courses into the stomach with tip curled in the fundus back towards the gastroesophageal junction. The lungs are clear and well expanded. Heart size is stable. Pulmonary vasculature are nondilated. No pleural effusion or pneumothorax. 1. Endotracheal tube is deep, tip essentially touching the krysta. Lungs are clear and well expanded. I would recommend 2-3 cm withdrawal for a more optimal positioning.  The results of this exam were discussed with Frida Branham (ICU nursing) on 4/24/2022 at 1640 hours Signed by Dr Makayla Meza    XR CHEST PORTABLE    Result Date: 4/24/2022  XR

## 2022-05-12 NOTE — PROGRESS NOTES
Palliative Care Progress Note  5/12/2022 8:52 AM    Patient:  Mahad Salcido  YOB: 1970  Primary Care Physician: ROMINA Jordan  Advance Directive: Full Code  Admit Date: 4/24/2022       Hospital Day: 25  Portions of this note have been copied forward, however, changed to reflect the most current clinical status of this patient. CHIEF COMPLAINT/REASON FOR CONSULTATION Goals of care    SUBJECTIVE:  Ms. Mihaela Oconnor remains sedated with trach and minimal vent settings. Review of Systems:   14 point review of systems is negative except as specifically addressed above. Objective:   VITALS:  BP (!) 154/64   Pulse 95   Temp 98.7 °F (37.1 °C) (Temporal)   Resp 15   Ht 5' 6\" (1.676 m)   Wt 137 lb 8 oz (62.4 kg)   SpO2 94%   BMI 22.19 kg/m²   24HR INTAKE/OUTPUT:      Intake/Output Summary (Last 24 hours) at 5/12/2022 6366  Last data filed at 5/12/2022 0400  Gross per 24 hour   Intake 2347.37 ml   Output 2300 ml   Net 47.37 ml     General appearance: 46 yo female, chronically ill appearing, trached/sedated, no acute distress   Head: Normocephalic, without obvious abnormality, atraumatic  Eyes: conjunctivae/corneas clear. Pupils sluggish  Ears: normal external ears and nose  Neck: supple, symmetrical, trachea midline, fresh trach present  Lungs: diminished bilaterally to asucultation, mechanically ventilated, equal chest rise  Heart: RRR, S1, S2 normal, no murmur  Abdomen: soft, no grimacing w/ palpation; non-distended, bowel sounds present.  FMS in place with small diarrhea noted   Extremities: trace edema in BUE, No lower extremity edema,  No erythema, no grimacing w/ palpation   Skin: warm, dry, pale  Neurologic: trach/sedated, does not respond to voice, withdrawals to pain, less on right side    Medications:      sodium chloride      sodium chloride      propofol 30 mcg/kg/min (05/12/22 8377)    dexmedetomidine HCl in NaCl Stopped (05/03/22 7790)    dextrose      sodium chloride Stopped (05/03/22 2005)      collagenase   Topical Daily    sodium chloride flush  5-40 mL IntraVENous 2 times per day    sodium chloride flush  5-40 mL IntraVENous 2 times per day    vancomycin (VANCOCIN) intermittent dosing (placeholder)   Other RX Placeholder    aminoglycoside intermittent dosing (placeholder)   Other RX Placeholder    acetylcysteine  600 mg Inhalation BID    ipratropium-albuterol  1 ampule Inhalation Q4H    insulin glargine  20 Units SubCUTAneous BID    aspirin  81 mg Oral Daily    heparin (porcine)  5,000 Units SubCUTAneous TID    menthol-zinc oxide   Topical BID    insulin lispro  0-12 Units SubCUTAneous TID WC    insulin lispro  0-6 Units SubCUTAneous Nightly    hydrALAZINE  100 mg Oral 3 times per day    sodium chloride flush  5-40 mL IntraVENous 2 times per day    levetiracetam  500 mg IntraVENous Q12H    chlorhexidine  15 mL Mouth/Throat BID    famotidine (PEPCID) injection  20 mg IntraVENous Daily     sodium chloride flush, sodium chloride, sodium chloride flush, sodium chloride, HYDROmorphone, HYDROmorphone, hydrALAZINE, racepinephrine HCl, sodium chloride nebulizer, Peppermint Spirit, sodium phosphate IVPB **OR** sodium phosphate IVPB, glucagon (rDNA), dextrose, glucose, dextrose bolus **OR** dextrose bolus, sodium chloride flush, sodium chloride, LORazepam, ondansetron **OR** ondansetron, polyethylene glycol, acetaminophen **OR** acetaminophen, magic butt cream, magnesium sulfate, potassium alternative oral replacement **OR** potassium chloride  Diet NPO Exceptions are: Sips of Water with Meds  ADULT TUBE FEEDING; PEG; Peptide Based High Protein; Continuous; 20; Yes; 10; Q 6 hours; 48; 0; Other (specify); no free water flush     Lab and other Data:     Recent Labs     05/10/22  0230 05/11/22  0432 05/12/22  0144   WBC 7.2 10.4 7.7   HGB 8.4* 9.3* 8.9*    322 322     Recent Labs     05/10/22  0230 05/11/22 0432 05/12/22  0144   * 135* 135*   K 4.4 5.2* 4.0   CL 90* 90* 93*   CO2 24 21* 24   BUN 45* 57* 34*   CREATININE 2.8* 3.4* 2.4*   GLUCOSE 197* 96 96     Recent Labs     05/10/22  0230 05/11/22  0432 05/12/22  0144   AST 14 15 18   ALT 7 7 9   BILITOT 0.3 0.3 0.3   ALKPHOS 90 101 100       Assessment/Plan   Principal Problem:    Acute ischemic stroke (HCC)  Active Problems:    Weakness    Severe malnutrition (HCC)    Gastroesophageal reflux disease without esophagitis    Acquired hypothyroidism    Anemia in chronic kidney disease (CKD)    Type 2 diabetes mellitus with chronic kidney disease on chronic dialysis, with long-term current use of insulin (HCC)    Acute encephalopathy    Respiratory failure ([facility]    Uncontrolled type 2 diabetes mellitus with complication, with long-term current use of insulin (HCC)    ESRD (end stage renal disease) on dialysis ([facility]    PVD (peripheral vascular disease) ([facility]    ESRD on hemodialysis ([facility]    Hyponatremia    Hyperglycemia due to type 2 diabetes mellitus ([facility]    Uncontrolled hypertension    Seizure ([facility] [facility]    Palliative care patient    Uncontrolled type 2 diabetes mellitus with hyperosmolar nonketotic hyperglycemia (HCC)    Altered mental state    Acute hypoxemic respiratory failure (HCC)    Generalized weakness    UTI (urinary tract infection)    Closed fracture of left distal femur (HCC)    History of infection with vancomycin resistant Enterococcus (VRE)  Resolved Problems:    * No resolved hospital problems. *      Visit Summary:  Chart reviewed, patient discussed with nursing staff. Reviewed health issues, work up and treatment plan as well as factors that lead to hospitalization. Report obtained from RN with no acute events overnight. S/p trach/PEG and pt continues to tolerate well. She remains on sedation and intermittently responsive. Plan for dialysis tomorrow. SW continues to assist with LTACH placement.   I called and spoke with her significant other/decision-maker, Camilo Henderson, to update on patient's status and plan of care.  He denies additional questions and remains agreeable to [facility] placement. Encouraged bedside visit. Opportunity for questions and emotional support provided. Will continue to follow. Recommendations:   1. Palliative care: GOC continue all aggressive measures. Sig other agreeable to [facility] transfer. Code status: FULL CODE    2. Acute hypoxemic respiratory failure- Extubated 5/3/22 but required reintubation that afternoon. Original intubation date 4/24/22. Now with trach/PEG 5/10/22    3. Acute left MCA infarct- mgmt per neuro. MRI 4/29/22 noted. Echo negative 4/30/22. On ASA. Supportive care    4. Hyperglycemia w/ Hx of DM II-mgmt per Hospitalist, SSI    5. S/p tonic-clonic seizure-mgmt per Neurology, cEEG discontinued, Keppra 500 mg BID    6. ESRD on HD-Nephrology managing. Dialysis today    7. Hypertension-Hydralazine 100 mg TID, cardene gtt as needed    8. Clostridium difficile/Norovirus-Oral vancomycin completed 5/8/22    9. MRSA/Raoultella ornithinolytica/Proteus vulgaris respiratory positivity- mgmt per hospitalist.    Thank you for consulting Palliative Care and allowing us to participate in the care of this patient.    Time Spent Counseling > 50%:  YES                                   Total Time Spent with patient/family counseling, workup/treatment review, counseling and placement of orders/preparation of this note: 26 minutes    Electronically signed by Vickey Goldberg, APRN - CNP on 5/12/2022 at 8:52 AM    (Please note that portions of this note were completed with a voice recognition program.  Edenilson Clement made to edit the dictations but occasionally words are mis-transcribed.)

## 2022-05-12 NOTE — PROGRESS NOTES
Hospitalist Progress Note  [facility]     Patient: Reed Cueto  : 1970  MRN: [id number]  Code Status: Full Code    Hospital Day: 18   Date of Service: 2022    Subjective:   Patient seen and examined. Trached and sedated.     Past Medical History:   Diagnosis Date    Acute ischemic stroke ([facility] 2022    Arthritis     Chronic kidney disease, stage 5, kidney failure (HCC)     Closed fracture of right shoulder girdle, with routine healing, subsequent encounter     DM (diabetes mellitus) type II controlled with renal manifestation ([facility] [address] 1993    Gastroparesis     GERD (gastroesophageal reflux disease)     Hemodialysis patient ([facility] [address] [facility]     dialysis on tues, thur, and sat at [facility]    Hypertension     Hypothyroid     Nasal congestion     recent    Neuropathy     Noncompliance with medications     Palliative care patient 10/08/2019    Restless leg syndrome        Past Surgical History:   Procedure Laterality Date    BREAST SURGERY Left     infected milk gland removed    BREAST SURGERY Right 2021    WEDGE EXCISION RIGHT BREAST DUCT WITH LACRIMAL DUCT PROBE, PEC BLOCK performed by Martha Garza MD at [address] [facility]          COLONOSCOPY Left 2020    Dr Nilsa Walker hepatic flexure-Severe tortuosity with severe spasming, 1 yr recall    DIALYSIS FISTULA CREATION Left 2019    REVISION LEFT UPPER EXTREMITY AV ACCESS WITH LEFT BRACHIAL ARTERY TO LEFT AXILLARY VEIN WITH  INTERPOSITIONAL ARTEGRAFT   performed by Jazzy Montesinos MD at [address]      GASTROSTOMY TUBE PLACEMENT N/A 5/10/2022    PERCUTANEOUS ENDOSCOPIC GASTROSTOMY TUBE PLACEMENT, performed by Jesika Keyes MD at [address] Right 2019    INSERTION OF RIGHT INTERNAL JUGULAR HEMODIALYSIS CATHETER performed by Jazzy Montesinos MD at [address] [facility]  2018    1.  Moderately increased stainable iron identified by special stain in Kupffer cells and occasional hepatocytes. Negative for evidence of significant portal or lobular inflammation. Negative for evidence of significant fibrosis    NC COLONOSCOPY W/BIOPSY SINGLE/MULTIPLE N/A 10/20/2017    Dr [place] Double prep-Tubular AP (-) dysplasia x 2--3 yr recall    NC EGD TRANSORAL BIOPSY SINGLE/MULTIPLE N/A 2016    Dr Ruthann Marie EGD TRANSORAL BIOPSY SINGLE/MULTIPLE N/A 10/20/2017    Dr Sosa Vaca (-)    TRACHEOSTOMY  5/10/2022    TRACHEOSTOMY PERCUTANEOUS performed by Hayden Camarillo MD at [address] VASCULAR SURGERY Left 2016    fistula by Dr Gaurang Arnold at [address]  10/02/2017    [facility]. Left upper fistulograms/venograms, balloon angioplasty cephalic vein arch [id number] conquest.    VASCULAR SURGERY  2018    FISTULOGRAM    VASCULAR SURGERY  10/29/2018    [facility]. Left upper fistulograms/venograms    VASCULAR SURGERY  2018    SJS. Arch aortogram,left upper arteriograms.  VASCULAR SURGERY  2019    SJS. Removal of tunneled dialyis catheter right internal jugular vein.  VASCULAR SURGERY  2019    [facility]JS. Left upper extremity fistulogram including venography to the superior vena cava. Balloon angioplasty left subclavian vein with 10mm x 40mm conquest balloon. Balloon angioplasty mid/proximal upper arm cephalic vein with 47NP x 40mm conquest balloon. Venograms after balloon angioplasty. Stent left mid/proximal upper arm cephalic vein stenosis fluency 10mm x 60mm self-expanding covered stent. Balloon     VASCULAR SURGERY  2019    cont angioplasty stent with 10mm x 40mm conquest balloon. Completion venograms left upper extremity.        Family History   Problem Relation Age of Onset    Colon Cancer Mother          at 63    Colon Polyps Mother     Lung Cancer Father          at 66    Colon Polyps Father     Stomach Cancer Sister     Breast Cancer Sister 27    Depression Daughter 25 committed suicide    Liver Cancer Neg Hx     Liver Disease Neg Hx     Esophageal Cancer Neg Hx     Rectal Cancer Neg Hx        Social History     Socioeconomic History    Marital status: Single     Spouse name: Not on file    Number of children: 2    Years of education: Not on file    Highest education level: Not on file   Occupational History    Not on file   Tobacco Use    Smoking status: Current Every Day Smoker     Packs/day: 0.50     Years: 33.00     Pack years: 16.50     Types: Cigarettes    Smokeless tobacco: Never Used    Tobacco comment: NOW at 1/4 PD, started at age 24 and has averaged 2 PPD   Vaping Use    Vaping Use: Never used   Substance and Sexual Activity    Alcohol use: No    Drug use: Not Currently     Types: Marijuana [place])    Sexual activity: Not Currently     Partners: Male   Other Topics Concern    Not on file   Social History Narrative    Not on file     Social Determinants of Health     Financial Resource Strain:     Difficulty of Paying Living Expenses: Not on file   Food Insecurity:     Worried About Running Out of Food in the Last Year: Not on file    Kendy of Food in the Last Year: Not on file   Transportation Needs:     Lack of Transportation (Medical): Not on file    Lack of Transportation (Non-Medical):  Not on file   Physical Activity:     Days of Exercise per Week: Not on file    Minutes of Exercise per Session: Not on file   Stress:     Feeling of Stress : Not on file   Social Connections:     Frequency of Communication with Friends and Family: Not on file    Frequency of Social Gatherings with Friends and Family: Not on file    Attends [employer] Services: Not on file    Active Member of Clubs or Organizations: Not on file    Attends Club or Organization Meetings: Not on file    Marital Status: Not on file   Intimate Partner Violence:     Fear of Current or Ex-Partner: Not on file    Emotionally Abused: Not on file    Physically Abused: Not on file    Sexually Abused: Not on file   Housing Stability:     Unable to Pay for Housing in the Last Year: Not on file    Number of Places Lived in the Last Year: Not on file    Unstable Housing in the Last Year: Not on file       Current Facility-Administered Medications   Medication Dose Route Frequency Provider Last Rate Last Admin    collagenase ointment   Topical Daily Rica Miller MD   Given at 05/12/22 0912    sodium chloride flush 0.9 % injection 5-40 mL  5-40 mL IntraVENous 2 times per day Sue Lei MD        sodium chloride flush 0.9 % injection 5-40 mL  5-40 mL IntraVENous PRN Sue Lei MD        0.9 % sodium chloride infusion   IntraVENous PRN Sue Lei MD        sodium chloride flush 0.9 % injection 5-40 mL  5-40 mL IntraVENous 2 times per day Diane Johnson MD   10 mL at 05/11/22 0912    sodium chloride flush 0.9 % injection 5-40 mL  5-40 mL IntraVENous PRN Diane Johnson MD        0.9 % sodium chloride infusion   IntraVENous PRN Diane Johnson MD        HYDROmorphone HCl PF (DILAUDID) injection 0.25 mg  0.25 mg IntraVENous Q5 Min PRN Diane Johnson MD        HYDROmorphone HCl PF (DILAUDID) injection 0.5 mg  0.5 mg IntraVENous Q5 Min PRN Diane Johnson MD        vancomycin Herbert Hush) intermittent dosing (placeholder)   Other RX Loco Marte MD        aminoglycoside intermittent dosing (placeholder)   Other RX Placeholder Sue Lei MD        acetylcysteine (MUCOMYST) 20 % solution 600 mg  600 mg Inhalation BID Sue Lei MD   600 mg at 05/12/22 0649    ipratropium-albuterol (DUONEB) nebulizer solution 1 ampule  1 ampule Inhalation Q4H Sue Lei MD   1 ampule at 05/12/22 1032    insulin glargine (LANTUS) injection vial 20 Units  20 Units SubCUTAneous BID Sue Lei MD   20 Units at 05/12/22 0911    hydrALAZINE (APRESOLINE) injection 10 mg  10 mg IntraVENous Q6H PRN Sue Lei MD   10 mg at 05/11/22 1820    aspirin chewable tablet 81 mg  81 mg Oral Daily Laurell Closs, MD   81 mg at 05/12/22 0912    racepinephrine HCl (VAPONEFPRIN) 2.25 % nebulizer solution NEBU 11.25 mg  11.25 mg Nebulization Q4H PRN Laurell Closs, MD   11.25 mg at 05/03/22 1408    sodium chloride nebulizer 0.9 % solution 3 mL  3 mL Nebulization Q4H PRN Laurell Closs, MD        propofol injection  5-50 mcg/kg/min IntraVENous Continuous Laurell Closs, MD 10.8 mL/hr at 05/12/22 1253 30 mcg/kg/min at 05/12/22 1253    heparin (porcine) injection 5,000 Units  5,000 Units SubCUTAneous TID Laurell Closs, MD   5,000 Units at 05/12/22 1439    Peppermint Spirit SPRT   Does not apply PRN Laurell Closs, MD        dexmedetomidine (PRECEDEX) 400 mcg in sodium chloride 0.9 % 100 mL infusion  0.1-1.5 mcg/kg/hr IntraVENous Continuous Laurell Closs, MD   Stopped at 05/03/22 1143    menthol-zinc oxide (CALMOSEPTINE) 0.44-20.6 % ointment   Topical BID Hadley Reese MD   Given at 05/11/22 1646    sodium phosphate 10.5 mmol in sodium chloride 0.9 % 250 mL IVPB  0.16 mmol/kg IntraVENous PRN Laurell Closs, MD        Or    sodium phosphate 21 mmol in sodium chloride 0.9 % 250 mL IVPB  0.32 mmol/kg IntraVENous PRN Laurell Closs, MD        insulin lispro (HUMALOG) injection vial 0-12 Units  0-12 Units SubCUTAneous TID WC Laurell Closs, MD   4 Units at 05/12/22 1214    insulin lispro (HUMALOG) injection vial 0-6 Units  0-6 Units SubCUTAneous Nightly Laurell Closs, MD   1 Units at 05/09/22 2119    hydrALAZINE (APRESOLINE) tablet 100 mg  100 mg Oral 3 times per day Laurell Closs, MD   100 mg at 05/12/22 1439    glucagon (rDNA) injection 1 mg  1 mg IntraMUSCular PRN Laurell Closs, MD        dextrose 5 % solution  100 mL/hr IntraVENous PRN Laurell Closs, MD        glucose chewable tablet 16 g  4 tablet Oral PRN Laurell Closs, MD        dextrose bolus (hypoglycemia) 10% 125 mL  125 mL IntraVENous PRN Laurell Closs, MD   Stopped at 05/11/22 2016    Or    dextrose bolus (hypoglycemia) 10% 250 mL  250 mL IntraVENous PRN Jean Betancourt MD   Stopped at 05/10/22 2026    sodium chloride flush 0.9 % injection 5-40 mL  5-40 mL IntraVENous 2 times per day Jean Betancourt MD   10 mL at 05/12/22 0912    sodium chloride flush 0.9 % injection 5-40 mL  5-40 mL IntraVENous PRN Jean Betancourt MD        0.9 % sodium chloride infusion   IntraVENous PRN Jean Betancourt MD   Stopped at 05/03/22 2005    LORazepam (ATIVAN) injection 1 mg  1 mg IntraVENous Q5 Min PRN Jean Betancourt MD        ondansetron (ZOFRAN-ODT) disintegrating tablet 4 mg  4 mg Oral Q8H PRN Jean Betancourt MD        Or    ondansetron [facility] injection 4 mg  4 mg IntraVENous Q6H PRN Jean Betancourt MD        polyethylene glycol [facility]) packet 17 g  17 g Oral Daily PRN Jean Betancourt MD        acetaminophen (TYLENOL) tablet 650 mg  650 mg Oral Q6H PRN Jean Betancourt MD   650 mg at 04/25/22 0035    Or    acetaminophen (TYLENOL) suppository 650 mg  650 mg Rectal Q6H PRN Jean Betancourt MD        levETIRAcetam (KEPPRA) 500 mg/100 mL IVPB  500 mg IntraVENous Q12H Jean Betancourt MD   Stopped at 05/12/22 1233    chlorhexidine (PERIDEX) 0.12 % solution 15 mL  15 mL Mouth/Throat BID Jean Betancourt MD   15 mL at 05/12/22 0912    famotidine (PEPCID) 20 mg in sodium chloride (PF) 10 mL injection  20 mg IntraVENous Daily Jean Betancourt MD   20 mg at 05/12/22 5866    magic butt cream   Topical Q4H PRN Jean Betancourt MD   Given at 05/03/22 0940    magnesium sulfate 1000 mg in dextrose 5% 100 mL IVPB  1,000 mg IntraVENous PRN Jean Betancourt MD        potassium bicarb-citric acid (EFFER-K) effervescent tablet 40 mEq  40 mEq Oral PRN Jean Betancourt MD        Or    potassium chloride 10 mEq/100 mL IVPB (Peripheral Line)  10 mEq IntraVENous PRN Jean Betancourt MD   Stopped at 04/25/22 0936         sodium chloride      sodium chloride      propofol 30 mcg/kg/min (05/12/22 5127)    dexmedetomidine HCl in NaCl Stopped (05/03/22 1143)    dextrose      sodium chloride Stopped (05/03/22 2005)        Objective:   BP (!) 143/63   Pulse 95   Temp 98.6 °F (37 °C)   Resp 14   Ht 5' 6\" (1.676 m)   Wt 137 lb 8 oz (62.4 kg)   SpO2 96%   BMI 22.19 kg/m²     General: no acute distress  HEENT: normocephalic, atraumatic  Lungs: faint rhonchi  Cardiovascular: s1 and s2 normal  Abdomen: soft, positive bowel sounds  Extremities: no cyanosis   Neuro: trached and sedated    Recent Labs     05/10/22  0230 05/11/22  0432 05/12/22  0144   WBC 7.2 10.4 7.7   RBC 2.97* 3.24* 3.09*   HGB 8.4* 9.3* 8.9*   HCT 28.5* 30.6* 29.6*   MCV 96.0 94.4 95.8   MCH 28.3 28.7 28.8   MCHC 29.5* 30.4* 30.1*    322 322     Recent Labs     05/10/22  0230 05/11/22  0432 05/12/22  0144   * 135* 135*   K 4.4 5.2* 4.0   ANIONGAP 21* 24* 18   CL 90* 90* 93*   CO2 24 21* 24   BUN 45* 57* 34*   CREATININE 2.8* 3.4* 2.4*   GLUCOSE 197* 96 96   CALCIUM 9.0 9.2 9.0     Recent Labs     05/10/22  0230 05/11/22  0432 05/12/22  0144   MG 2.4 2.6 2.4   PHOS 6.1* 6.9* 5.0*     Recent Labs     05/10/22  0230 05/11/22  0432 05/12/22  0144   AST 14 15 18   ALT 7 7 9   BILITOT 0.3 0.3 0.3   ALKPHOS 90 101 100     No results for input(s): PH, PO2, PCO2, HCO3, BE, O2SAT in the last 72 hours. No results for input(s): TROPONINI in the last 72 hours. Recent Labs     05/10/22  0230 05/11/22  0432 05/12/22  0144   INR 1.00 1.07 1.12     No results for input(s): LACTA in the last 72 hours. Intake/Output Summary (Last 24 hours) at 5/12/2022 1537  Last data filed at 5/12/2022 1200  Gross per 24 hour   Intake 1903.54 ml   Output 200 ml   Net 1703.54 ml       No results found.      Assessment and Plan:   Acute left MCA infarct  Per MRI brain 4/29/2022  Neurology following  Neurochecks  Meds per neurology     Seizure  Neurology following  AEDs per neurology  Seizure precautions     MRSA/Raoultella ornithinolytica/Proteus vulgaris respiratory culture positivity  Empiric agents on board per sensitivities     Ventilator dependent acute respiratory failure  Secondary to above processes  Multiple failed SBT  Failed extubation 5/30/2022  S/p trach/PEG 5/10/2022 per Dr. Devonte Lo     ESRD on HD  Renal following     Medical noncompliance       C. difficile infection  Completed oral vancomycin course     Tobacco dependence       HHS  Since resolved  History of IDDM2  Meds on board  Follow Accu-Cheks/electrolytes  HbA1c 9.4  Poor control     DVT prophylaxis  Heparin    Total critical care time: 41 minutes    Juanis Diamond MD   5/12/2022 3:37 PM

## 2022-05-12 NOTE — CARE COORDINATION
Insurance has denied at this time and requesting a P2P stating recent trach and PEG placement and on sedation, requesting first trach change prior to transfer to [facility]. Suggest the physician complete the P2P to informing the pt has been in acute care for 17 days with multiple readmits (previous admits April, March and 3 admits in January) and [facility] services specializiing in vent and sedation weaning. Pt is requiring bed in CCU for care when appropriate for next level of care transfer to [facility] services. P2P to be completed by calling [phone] prior to 5:45 EST today 5/12.

## 2022-05-12 NOTE — PROGRESS NOTES
[facility] Neurology  [address] [place] [address] Drive, [address]  [place], [place] [address]  Phone [phone]     Neurology Progress Note  2022 2:14 PM  Information:   Patient Name: Sudarshan Arriaga  :   1970  Age:   47 y.o. MRN:   [id number]  Account #:  [de-identified]  Admit Date:   2022  Today:  22     ADMIT DX:   Acute ischemic stroke [facility])    Subjective:     Sudarshan Arriaga is a 48 YO woman with DM and ESRD on HD who was admitted  after an unresponsive episode at home and a generalized convulsive seizure in the ER. Charla Pina had a very high blood sugar and respiratory failure requiring intubation. Charla Pina has had little recovery.  She had an MRI head  that showed an acute left MCA distribution infarct.  Intermittently when sedation is held, she will have minor responsiveness. She had Trach/PEG 5/10.     Interval History:   She remains sedated and minimally responsive on a ventilator in the CCU.  She is presently on 30 mcg/kg/hr Propofol.      Objective:     Past Medical History:  Past Medical History:   Diagnosis Date    Acute ischemic stroke ([facility] 2022    Arthritis     Chronic kidney disease, stage 5, kidney failure (HCC)     Closed fracture of right shoulder girdle, with routine healing, subsequent encounter     DM (diabetes mellitus) type II controlled with renal manifestation ([facility] 1993    Gastroparesis     GERD (gastroesophageal reflux disease)     Hemodialysis patient ([facility]     dialysis on , and sat at [facility]    Hypertension     Hypothyroid     Nasal congestion     recent    Neuropathy     Noncompliance with medications     Palliative care patient 10/08/2019    Restless leg syndrome        Past Surgical History:   Procedure Laterality Date    BREAST SURGERY Left     infected milk gland removed    BREAST SURGERY Right 2021    WEDGE EXCISION RIGHT BREAST DUCT WITH LACRIMAL DUCT PROBE, PEC BLOCK performed by Hodan Harris MD at [address] [place] [address] LAPAROSCOPIC      2008    COLONOSCOPY Left 9/17/2020    Dr Zepeda Po hepatic flexure-Severe tortuosity with severe spasming, 1 yr recall    DIALYSIS FISTULA CREATION Left 1/25/2019    REVISION LEFT UPPER EXTREMITY AV ACCESS WITH LEFT BRACHIAL ARTERY TO LEFT AXILLARY VEIN WITH  INTERPOSITIONAL ARTEGRAFT   performed by Dilip Sylvester MD at [address]  2012    GASTROSTOMY TUBE PLACEMENT N/A 5/10/2022    PERCUTANEOUS ENDOSCOPIC GASTROSTOMY TUBE PLACEMENT, performed by Laurell Closs, MD at [address] Right 1/25/2019    INSERTION OF RIGHT INTERNAL JUGULAR HEMODIALYSIS CATHETER performed by Dilip Sylvester MD at [facility]  08/17/2018    1.  Moderately increased stainable iron identified by special stain in Kupffer cells and occasional hepatocytes. Negative for evidence of significant portal or lobular inflammation. Negative for evidence of significant fibrosis    NC COLONOSCOPY W/BIOPSY SINGLE/MULTIPLE N/A 10/20/2017    Dr Venkata Goode prep-Tubular AP (-) dysplasia x 2--3 yr recall    NC EGD TRANSORAL BIOPSY SINGLE/MULTIPLE N/A 12/27/2016    Dr Brown Cooks EGD TRANSORAL BIOPSY SINGLE/MULTIPLE N/A 10/20/2017    Dr Jaspal Martin (-)    TRACHEOSTOMY  5/10/2022    TRACHEOSTOMY PERCUTANEOUS performed by Laurell Closs, MD at [facility] VASCULAR SURGERY Left 2016    fistula by Dr Denny Cat at [address]  10/02/2017    SJS. Left upper fistulograms/venograms, balloon angioplasty cephalic vein arch [id number] conquest.    VASCULAR SURGERY  08/2018    FISTULOGRAM    VASCULAR SURGERY  10/29/2018    SJS. Left upper fistulograms/venograms    VASCULAR SURGERY  12/19/2018    SJS. Arch aortogram,left upper arteriograms.  VASCULAR SURGERY  03/06/2019    SJS. Removal of tunneled dialyis catheter right internal jugular vein.  VASCULAR SURGERY  08/28/2019    SJS. Left upper extremity fistulogram including venography to the superior vena cava. Balloon angioplasty left subclavian vein with 10mm x 40mm conquest balloon. Balloon angioplasty mid/proximal upper arm cephalic vein with 78XL x 40mm conquest balloon. Venograms after balloon angioplasty. Stent left mid/proximal upper arm cephalic vein stenosis fluency 10mm x 60mm self-expanding covered stent. Balloon     VASCULAR SURGERY  2019    cont angioplasty stent with 10mm x 40mm conquest balloon. Completion venograms left upper extremity.        Family History   Problem Relation Age of Onset    Colon Cancer Mother          at 63    Colon Polyps Mother     Lung Cancer Father          at 79    Colon Polyps Father     Stomach Cancer Sister     Breast Cancer Sister 27    Depression Daughter 25        committed suicide    Liver Cancer Neg Hx     Liver Disease Neg Hx     Esophageal Cancer Neg Hx     Rectal Cancer Neg Hx        Social History     Socioeconomic History    Marital status: Single     Spouse name: Not on file    Number of children: 2    Years of education: Not on file    Highest education level: Not on file   Occupational History    Not on file   Tobacco Use    Smoking status: Current Every Day Smoker     Packs/day: 0.50     Years: 33.00     Pack years: 16.50     Types: Cigarettes    Smokeless tobacco: Never Used    Tobacco comment: NOW at 1/4 PD, started at age 24 and has averaged 2 PPD   Vaping Use    Vaping Use: Never used   Substance and Sexual Activity    Alcohol use: No    Drug use: Not Currently     Types: Marijuana Myrtis Regulus)    Sexual activity: Not Currently     Partners: Male   Other Topics Concern    Not on file   Social History Narrative    Not on file     Social Determinants of Health     Financial Resource Strain:     Difficulty of Paying Living Expenses: Not on file   Food Insecurity:     Worried About Running Out of Food in the Last Year: Not on file    Kendy of Food in the Last Year: Not on file   Transportation Needs:     Lack of Transportation (Medical): Not on file    Lack of Transportation (Non-Medical):  Not on file   Physical Activity:     Days of Exercise per Week: Not on file    Minutes of Exercise per Session: Not on file   Stress:     Feeling of Stress : Not on file   Social Connections:     Frequency of Communication with Friends and Family: Not on file    Frequency of Social Gatherings with Friends and Family: Not on file    Attends Gnosticism Services: Not on file    Active Member of Clubs or Organizations: Not on file    Attends Club or Organization Meetings: Not on file    Marital Status: Not on file   Intimate Partner Violence:     Fear of Current or Ex-Partner: Not on file    Emotionally Abused: Not on file    Physically Abused: Not on file    Sexually Abused: Not on file   Housing Stability:     Unable to Pay for Housing in the Last Year: Not on file    Number of Places Lived in the Last Year: Not on file    Unstable Housing in the Last Year: Not on file       Medications:   sodium chloride      sodium chloride      propofol 30 mcg/kg/min (05/12/22 1253)    dexmedetomidine HCl in NaCl Stopped (05/03/22 1143)    dextrose      sodium chloride Stopped (05/03/22 2005)      collagenase   Topical Daily    sodium chloride flush  5-40 mL IntraVENous 2 times per day    sodium chloride flush  5-40 mL IntraVENous 2 times per day    vancomycin (VANCOCIN) intermittent dosing (placeholder)   Other RX Placeholder    aminoglycoside intermittent dosing (placeholder)   Other RX Placeholder    acetylcysteine  600 mg Inhalation BID    ipratropium-albuterol  1 ampule Inhalation Q4H    insulin glargine  20 Units SubCUTAneous BID    aspirin  81 mg Oral Daily    heparin (porcine)  5,000 Units SubCUTAneous TID    menthol-zinc oxide   Topical BID    insulin lispro  0-12 Units SubCUTAneous TID WC    insulin lispro  0-6 Units SubCUTAneous Nightly    hydrALAZINE  100 mg Oral 3 times per day    sodium chloride flush 5-40 mL IntraVENous 2 times per day    levetiracetam  500 mg IntraVENous Q12H    chlorhexidine  15 mL Mouth/Throat BID    famotidine (PEPCID) injection  20 mg IntraVENous Daily       Diagnostic Studies:  Reviewed all labs/diagnositcs since last 24hrs:  Recent Results (from the past 24 hour(s))   POCT Glucose    Collection Time: 05/11/22  5:49 PM   Result Value Ref Range    POC Glucose 77 70 - 99 mg/dl    Performed on [employer]    POCT Glucose    Collection Time: 05/11/22  8:01 PM   Result Value Ref Range    POC Glucose 62 (L) 70 - 99 mg/dl    Performed on [employer]k    POCT Glucose    Collection Time: 05/11/22  8:20 PM   Result Value Ref Range    POC Glucose 111 (H) 70 - 99 mg/dl    Performed on [employer]uChek    CBC with Auto Differential    Collection Time: 05/12/22  1:44 AM   Result Value Ref Range    WBC 7.7 4.8 - 10.8 K/uL    RBC 3.09 (L) 4.20 - 5.40 M/uL    Hemoglobin 8.9 (L) 12.0 - 16.0 g/dL    Hematocrit 29.6 (L) 37.0 - 47.0 %    MCV 95.8 81.0 - 99.0 fL    MCH 28.8 27.0 - 31.0 pg    MCHC 30.1 (L) 33.0 - 37.0 g/dL    RDW 19.1 (H) 11.5 - 14.5 %    Platelets 500 647 - 952 K/uL    MPV 9.8 9.4 - 12.3 fL    Neutrophils % 76.6 (H) 50.0 - 65.0 %    Lymphocytes % 12.1 (L) 20.0 - 40.0 %    Monocytes % 9.3 0.0 - 10.0 %    Eosinophils % 1.2 0.0 - 5.0 %    Basophils % 0.5 0.0 - 1.0 %    Neutrophils Absolute 5.9 1.5 - 7.5 K/uL    Immature Granulocytes # 0.0 K/uL    Lymphocytes Absolute 0.9 (L) 1.1 - 4.5 K/uL    Monocytes Absolute 0.70 0.00 - 0.90 K/uL    Eosinophils Absolute 0.10 0.00 - 0.60 K/uL    Basophils Absolute 0.00 0.00 - 0.20 K/uL   Comprehensive Metabolic Panel    Collection Time: 05/12/22  1:44 AM   Result Value Ref Range    Sodium 135 (L) 136 - 145 mmol/L    Potassium 4.0 3.5 - 5.0 mmol/L    Chloride 93 (L) 98 - 111 mmol/L    CO2 24 22 - 29 mmol/L    Anion Gap 18 7 - 19 mmol/L    Glucose 96 74 - 109 mg/dL    BUN 34 (H) 6 - 20 mg/dL    CREATININE 2.4 (H) 0.5 - 0.9 mg/dL    GFR Non-African American 21 (A) >60    GFR  26 (L) >59    Calcium 9.0 8.6 - 10.0 mg/dL    Total Protein 6.0 (L) 6.6 - 8.7 g/dL    Albumin 3.0 (L) 3.5 - 5.2 g/dL    Total Bilirubin 0.3 0.2 - 1.2 mg/dL    Alkaline Phosphatase 100 35 - 104 U/L    ALT 9 5 - 33 U/L    AST 18 5 - 32 U/L   Magnesium    Collection Time: 05/12/22  1:44 AM   Result Value Ref Range    Magnesium 2.4 1.6 - 2.6 mg/dL   Phosphorus    Collection Time: 05/12/22  1:44 AM   Result Value Ref Range    Phosphorus 5.0 (H) 2.5 - 4.5 mg/dL   Protime-INR    Collection Time: 05/12/22  1:44 AM   Result Value Ref Range    Protime 14.4 12.0 - 14.6 sec    INR 1.12 0.88 - 1.18   POCT Glucose    Collection Time: 05/12/22  8:19 AM   Result Value Ref Range    POC Glucose 233 (H) 70 - 99 mg/dl    Performed on [facility]uChek    POCT Glucose    Collection Time: 05/12/22 12:10 PM   Result Value Ref Range    POC Glucose 222 (H) 70 - 99 mg/dl    Performed on AccuChek        Diet:  Diet NPO Exceptions are: Sips of Water with Meds  ADULT TUBE FEEDING; PEG; Peptide Based High Protein; Continuous; 20; Yes; 10; Q 6 hours; 48; 0; Other (specify); no free water flush    Examination:  Vitals: BP (!) 143/63   Pulse 95   Temp 98.6 °F (37 °C)   Resp 14   Ht 5' 6\" (1.676 m)   Wt 137 lb 8 oz (62.4 kg)   SpO2 96%   BMI 22.19 kg/m²      Intake/Output Summary (Last 24 hours) at 5/12/2022 1414  Last data filed at 5/12/2022 1200  Gross per 24 hour   Intake 1903.54 ml   Output 200 ml   Net 1703.54 ml     General appearance:  She is a chronically ill appearing woman who is intubated, sedated, and unresponsive in the CCU. HEENT: normocephalic, atraumatic.   Lungs: clear to auscultation bilaterally  Heart: regular rate and rhythm, S1, S2 normal, no murmur, click, rub or gallop  Extremities: extremities normal, atraumatic, no cyanosis or edema  Neurologic:   She opens eyes to voice.  She has a left gaze preference.  Pupils are small and reactive.  Corneal reflexes are present.  Oculocephalic reflexes are absent.  No

## 2022-05-12 NOTE — PROGRESS NOTES
Nutrition Assessment     Type and Reason for Visit: Reassess    Malnutrition Assessment:  Malnutrition Status: Severe malnutrition    Nutrition Assessment:  Propofol remains at 10.8 ml/hr. TF at goal of 48 ml/hr with <5 ml residuals. Aware H2O flush has been started, currently at 20 ml/hr. Na slightly low at 135. Will continue to monitor. Nutrition Related Findings:   PEG, trach, dialysis Wound Type: Pressure Injury,Unstageable,Surgical Incision    Current Nutrition Therapies:    Diet NPO Exceptions are: Sips of Water with Meds  ADULT TUBE FEEDING; PEG; Peptide Based High Protein; Continuous; 20; Yes; 10; Q 6 hours; 48; 0; Other (specify); no free water flush    Anthropometric Measures:  · Height: 5' 6\" (167.6 cm)  · Current Body Wt: 137 lb (62.1 kg)   · BMI: 22.1    Nutrition Interventions:   Food and/or Nutrient Delivery: Continue NPO,Continue Current Tube Feeding  Nutrition Education/Counseling: No recommendation at this time  Coordination of Nutrition Care: Continue to monitor while inpatient    Goals:  Previous Goal Met: Progressing toward Goal(s)  Goals: Meet at least 75% of estimated needs,Tolerate nutrition support at goal rate       Nutrition Monitoring and Evaluation:   Behavioral-Environmental Outcomes: None Identified  Food/Nutrient Intake Outcomes: Enteral Nutrition Intake/Tolerance  Physical Signs/Symptoms Outcomes: Biochemical Data,Fluid Status or Edema,Weight,Skin,Nutrition Focused Physical Findings    Discharge Planning:     Too soon to determine     Libra Martinez MS, RD, LD  Contact: [phone]

## 2022-05-13 LAB
ALBUMIN SERPL-MCNC: 2.8 G/DL (ref 3.5–5.2)
ALP BLD-CCNC: 98 U/L (ref 35–104)
ALT SERPL-CCNC: 8 U/L (ref 5–33)
ANION GAP SERPL CALCULATED.3IONS-SCNC: 20 MMOL/L (ref 7–19)
AST SERPL-CCNC: 13 U/L (ref 5–32)
BASOPHILS ABSOLUTE: 0.1 K/UL (ref 0–0.2)
BASOPHILS RELATIVE PERCENT: 0.6 % (ref 0–1)
BILIRUB SERPL-MCNC: 0.3 MG/DL (ref 0.2–1.2)
BUN BLDV-MCNC: 58 MG/DL (ref 6–20)
CALCIUM SERPL-MCNC: 9.3 MG/DL (ref 8.6–10)
CHLORIDE BLD-SCNC: 89 MMOL/L (ref 98–111)
CO2: 23 MMOL/L (ref 22–29)
CREAT SERPL-MCNC: 3.3 MG/DL (ref 0.5–0.9)
EOSINOPHILS ABSOLUTE: 0.1 K/UL (ref 0–0.6)
EOSINOPHILS RELATIVE PERCENT: 1.3 % (ref 0–5)
GENTAMICIN DOSE AMOUNT: NORMAL
GENTAMICIN RANDOM: 1.8 UG/ML
GFR AFRICAN AMERICAN: 18
GFR NON-AFRICAN AMERICAN: 15
GLUCOSE BLD-MCNC: 133 MG/DL (ref 70–99)
GLUCOSE BLD-MCNC: 148 MG/DL (ref 70–99)
GLUCOSE BLD-MCNC: 68 MG/DL (ref 70–99)
GLUCOSE BLD-MCNC: 72 MG/DL (ref 70–99)
GLUCOSE BLD-MCNC: 77 MG/DL (ref 70–99)
GLUCOSE BLD-MCNC: 96 MG/DL (ref 74–109)
HAV IGM SER IA-ACNC: NORMAL
HCT VFR BLD CALC: 27.4 % (ref 37–47)
HEMOGLOBIN: 8.3 G/DL (ref 12–16)
HEPATITIS B CORE IGM ANTIBODY: NORMAL
HEPATITIS B SURFACE ANTIGEN INTERPRETATION: NORMAL
HEPATITIS C ANTIBODY INTERPRETATION: NORMAL
IMMATURE GRANULOCYTES #: 0.1 K/UL
INR BLD: 1.16 (ref 0.88–1.18)
LYMPHOCYTES ABSOLUTE: 1 K/UL (ref 1.1–4.5)
LYMPHOCYTES RELATIVE PERCENT: 10.1 % (ref 20–40)
MAGNESIUM: 2.6 MG/DL (ref 1.6–2.6)
MCH RBC QN AUTO: 28.8 PG (ref 27–31)
MCHC RBC AUTO-ENTMCNC: 30.3 G/DL (ref 33–37)
MCV RBC AUTO: 95.1 FL (ref 81–99)
MONOCYTES ABSOLUTE: 0.9 K/UL (ref 0–0.9)
MONOCYTES RELATIVE PERCENT: 8.8 % (ref 0–10)
NEUTROPHILS ABSOLUTE: 8 K/UL (ref 1.5–7.5)
NEUTROPHILS RELATIVE PERCENT: 78.7 % (ref 50–65)
PDW BLD-RTO: 18.8 % (ref 11.5–14.5)
PERFORMED ON: ABNORMAL
PERFORMED ON: NORMAL
PERFORMED ON: NORMAL
PHOSPHORUS: 7.2 MG/DL (ref 2.5–4.5)
PLATELET # BLD: 321 K/UL (ref 130–400)
PMV BLD AUTO: 9.5 FL (ref 9.4–12.3)
POTASSIUM SERPL-SCNC: 4.7 MMOL/L (ref 3.5–5)
PROTHROMBIN TIME: 14.8 SEC (ref 12–14.6)
RBC # BLD: 2.88 M/UL (ref 4.2–5.4)
SODIUM BLD-SCNC: 132 MMOL/L (ref 136–145)
TOTAL PROTEIN: 6.6 G/DL (ref 6.6–8.7)
VANCOMYCIN RANDOM: 22.6 UG/ML
WBC # BLD: 10.2 K/UL (ref 4.8–10.8)

## 2022-05-13 PROCEDURE — 8010000000 HC HEMODIALYSIS ACUTE INPT

## 2022-05-13 PROCEDURE — 6360000002 HC RX W HCPCS: Performed by: SURGERY

## 2022-05-13 PROCEDURE — 94003 VENT MGMT INPAT SUBQ DAY: CPT

## 2022-05-13 PROCEDURE — 6370000000 HC RX 637 (ALT 250 FOR IP): Performed by: INTERNAL MEDICINE

## 2022-05-13 PROCEDURE — 36415 COLL VENOUS BLD VENIPUNCTURE: CPT

## 2022-05-13 PROCEDURE — 80202 ASSAY OF VANCOMYCIN: CPT

## 2022-05-13 PROCEDURE — 85610 PROTHROMBIN TIME: CPT

## 2022-05-13 PROCEDURE — 2580000003 HC RX 258: Performed by: INTERNAL MEDICINE

## 2022-05-13 PROCEDURE — 6370000000 HC RX 637 (ALT 250 FOR IP): Performed by: SURGERY

## 2022-05-13 PROCEDURE — 6370000000 HC RX 637 (ALT 250 FOR IP): Performed by: PSYCHIATRY & NEUROLOGY

## 2022-05-13 PROCEDURE — 80053 COMPREHEN METABOLIC PANEL: CPT

## 2022-05-13 PROCEDURE — 99232 SBSQ HOSP IP/OBS MODERATE 35: CPT | Performed by: PSYCHIATRY & NEUROLOGY

## 2022-05-13 PROCEDURE — 99231 SBSQ HOSP IP/OBS SF/LOW 25: CPT

## 2022-05-13 PROCEDURE — 84100 ASSAY OF PHOSPHORUS: CPT

## 2022-05-13 PROCEDURE — 83735 ASSAY OF MAGNESIUM: CPT

## 2022-05-13 PROCEDURE — 80074 ACUTE HEPATITIS PANEL: CPT

## 2022-05-13 PROCEDURE — 82947 ASSAY GLUCOSE BLOOD QUANT: CPT

## 2022-05-13 PROCEDURE — 2580000003 HC RX 258: Performed by: SURGERY

## 2022-05-13 PROCEDURE — 80170 ASSAY OF GENTAMICIN: CPT

## 2022-05-13 PROCEDURE — 2000000000 HC ICU R&B

## 2022-05-13 PROCEDURE — 2500000003 HC RX 250 WO HCPCS: Performed by: SURGERY

## 2022-05-13 PROCEDURE — 6360000002 HC RX W HCPCS: Performed by: INTERNAL MEDICINE

## 2022-05-13 PROCEDURE — 2700000000 HC OXYGEN THERAPY PER DAY

## 2022-05-13 PROCEDURE — 94640 AIRWAY INHALATION TREATMENT: CPT

## 2022-05-13 PROCEDURE — 85025 COMPLETE CBC W/AUTO DIFF WBC: CPT

## 2022-05-13 RX ORDER — NIFEDIPINE 90 MG/1
90 TABLET, EXTENDED RELEASE ORAL DAILY
Status: DISCONTINUED | OUTPATIENT
Start: 2022-05-13 | End: 2022-05-25 | Stop reason: HOSPADM

## 2022-05-13 RX ORDER — DULOXETIN HYDROCHLORIDE 60 MG/1
60 CAPSULE, DELAYED RELEASE ORAL DAILY
Status: DISCONTINUED | OUTPATIENT
Start: 2022-05-13 | End: 2022-05-25 | Stop reason: HOSPADM

## 2022-05-13 RX ORDER — LEVETIRACETAM 100 MG/ML
500 SOLUTION ORAL 2 TIMES DAILY
Status: DISCONTINUED | OUTPATIENT
Start: 2022-05-13 | End: 2022-05-25 | Stop reason: HOSPADM

## 2022-05-13 RX ORDER — LEVOTHYROXINE SODIUM 0.07 MG/1
150 TABLET ORAL DAILY
Status: DISCONTINUED | OUTPATIENT
Start: 2022-05-13 | End: 2022-05-25 | Stop reason: HOSPADM

## 2022-05-13 RX ORDER — HYDRALAZINE HYDROCHLORIDE 50 MG/1
100 TABLET, FILM COATED ORAL EVERY 8 HOURS SCHEDULED
Status: DISCONTINUED | OUTPATIENT
Start: 2022-05-13 | End: 2022-05-13

## 2022-05-13 RX ORDER — ATORVASTATIN CALCIUM 20 MG/1
20 TABLET, FILM COATED ORAL DAILY
Status: DISCONTINUED | OUTPATIENT
Start: 2022-05-13 | End: 2022-05-25 | Stop reason: HOSPADM

## 2022-05-13 RX ORDER — ROPINIROLE 4 MG/1
4 TABLET, FILM COATED ORAL NIGHTLY
Status: DISCONTINUED | OUTPATIENT
Start: 2022-05-13 | End: 2022-05-25 | Stop reason: HOSPADM

## 2022-05-13 RX ORDER — SEVELAMER CARBONATE 800 MG/1
800 TABLET, FILM COATED ORAL
Status: DISCONTINUED | OUTPATIENT
Start: 2022-05-13 | End: 2022-05-16 | Stop reason: SDUPTHER

## 2022-05-13 RX ORDER — ISOSORBIDE MONONITRATE 60 MG/1
120 TABLET, EXTENDED RELEASE ORAL DAILY
Status: DISCONTINUED | OUTPATIENT
Start: 2022-05-13 | End: 2022-05-25 | Stop reason: HOSPADM

## 2022-05-13 RX ORDER — METOPROLOL SUCCINATE 50 MG/1
100 TABLET, EXTENDED RELEASE ORAL
Status: DISCONTINUED | OUTPATIENT
Start: 2022-05-14 | End: 2022-05-25 | Stop reason: HOSPADM

## 2022-05-13 RX ADMIN — SODIUM CHLORIDE, PRESERVATIVE FREE 20 MG: 5 INJECTION INTRAVENOUS at 07:53

## 2022-05-13 RX ADMIN — HYDRALAZINE HYDROCHLORIDE 100 MG: 50 TABLET, FILM COATED ORAL at 20:50

## 2022-05-13 RX ADMIN — LEVOTHYROXINE SODIUM 150 MCG: 75 TABLET ORAL at 18:31

## 2022-05-13 RX ADMIN — HEPARIN SODIUM 5000 UNITS: 5000 INJECTION INTRAVENOUS; SUBCUTANEOUS at 14:37

## 2022-05-13 RX ADMIN — GENTAMICIN SULFATE 187.2 MG: 40 INJECTION, SOLUTION INTRAMUSCULAR; INTRAVENOUS at 13:40

## 2022-05-13 RX ADMIN — IPRATROPIUM BROMIDE AND ALBUTEROL SULFATE 1 AMPULE: 2.5; .5 SOLUTION RESPIRATORY (INHALATION) at 18:16

## 2022-05-13 RX ADMIN — INSULIN GLARGINE 20 UNITS: 100 INJECTION, SOLUTION SUBCUTANEOUS at 20:50

## 2022-05-13 RX ADMIN — ANORECTAL OINTMENT: 15.7; .44; 24; 20.6 OINTMENT TOPICAL at 05:31

## 2022-05-13 RX ADMIN — COLLAGENASE SANTYL: 250 OINTMENT TOPICAL at 07:55

## 2022-05-13 RX ADMIN — ACETYLCYSTEINE 600 MG: 200 SOLUTION ORAL; RESPIRATORY (INHALATION) at 06:21

## 2022-05-13 RX ADMIN — LEVETIRACETAM 500 MG: 500 SOLUTION ORAL at 20:50

## 2022-05-13 RX ADMIN — LEVETIRACETAM 500 MG: 5 INJECTION INTRAVENOUS at 00:44

## 2022-05-13 RX ADMIN — DULOXETINE 60 MG: 60 CAPSULE, DELAYED RELEASE ORAL at 18:31

## 2022-05-13 RX ADMIN — IPRATROPIUM BROMIDE AND ALBUTEROL SULFATE 1 AMPULE: 2.5; .5 SOLUTION RESPIRATORY (INHALATION) at 22:07

## 2022-05-13 RX ADMIN — HEPARIN SODIUM 5000 UNITS: 5000 INJECTION INTRAVENOUS; SUBCUTANEOUS at 07:53

## 2022-05-13 RX ADMIN — SEVELAMER CARBONATE 800 MG: 800 TABLET, FILM COATED ORAL at 18:31

## 2022-05-13 RX ADMIN — ROPINIROLE HYDROCHLORIDE 4 MG: 4 TABLET, FILM COATED ORAL at 20:50

## 2022-05-13 RX ADMIN — HYDRALAZINE HYDROCHLORIDE 10 MG: 20 INJECTION INTRAMUSCULAR; INTRAVENOUS at 16:02

## 2022-05-13 RX ADMIN — VANCOMYCIN HYDROCHLORIDE 750 MG: 750 INJECTION, POWDER, LYOPHILIZED, FOR SOLUTION INTRAVENOUS at 14:37

## 2022-05-13 RX ADMIN — PROPOFOL 30 MCG/KG/MIN: 10 INJECTION, EMULSION INTRAVENOUS at 05:45

## 2022-05-13 RX ADMIN — ATORVASTATIN CALCIUM 20 MG: 20 TABLET, FILM COATED ORAL at 18:31

## 2022-05-13 RX ADMIN — IPRATROPIUM BROMIDE AND ALBUTEROL SULFATE 1 AMPULE: 2.5; .5 SOLUTION RESPIRATORY (INHALATION) at 14:34

## 2022-05-13 RX ADMIN — CHLORHEXIDINE GLUCONATE 15 ML: 1.2 RINSE ORAL at 20:52

## 2022-05-13 RX ADMIN — ASPIRIN 81 MG 81 MG: 81 TABLET ORAL at 07:53

## 2022-05-13 RX ADMIN — CHLORHEXIDINE GLUCONATE 15 ML: 1.2 RINSE ORAL at 07:55

## 2022-05-13 RX ADMIN — INSULIN LISPRO 2 UNITS: 100 INJECTION, SOLUTION INTRAVENOUS; SUBCUTANEOUS at 16:23

## 2022-05-13 RX ADMIN — IPRATROPIUM BROMIDE AND ALBUTEROL SULFATE 1 AMPULE: 2.5; .5 SOLUTION RESPIRATORY (INHALATION) at 01:59

## 2022-05-13 RX ADMIN — IPRATROPIUM BROMIDE AND ALBUTEROL SULFATE 1 AMPULE: 2.5; .5 SOLUTION RESPIRATORY (INHALATION) at 06:21

## 2022-05-13 RX ADMIN — IPRATROPIUM BROMIDE AND ALBUTEROL SULFATE 1 AMPULE: 2.5; .5 SOLUTION RESPIRATORY (INHALATION) at 10:24

## 2022-05-13 RX ADMIN — HYDRALAZINE HYDROCHLORIDE 100 MG: 50 TABLET, FILM COATED ORAL at 05:31

## 2022-05-13 RX ADMIN — HYDRALAZINE HYDROCHLORIDE 10 MG: 20 INJECTION INTRAMUSCULAR; INTRAVENOUS at 04:04

## 2022-05-13 RX ADMIN — HEPARIN SODIUM 5000 UNITS: 5000 INJECTION INTRAVENOUS; SUBCUTANEOUS at 20:50

## 2022-05-13 RX ADMIN — ACETYLCYSTEINE 600 MG: 200 SOLUTION ORAL; RESPIRATORY (INHALATION) at 18:17

## 2022-05-13 RX ADMIN — PROPOFOL 25 MCG/KG/MIN: 10 INJECTION, EMULSION INTRAVENOUS at 16:05

## 2022-05-13 RX ADMIN — ANORECTAL OINTMENT: 15.7; .44; 24; 20.6 OINTMENT TOPICAL at 18:24

## 2022-05-13 ASSESSMENT — PULMONARY FUNCTION TESTS
PIF_VALUE: 17
PIF_VALUE: 21
PIF_VALUE: 17
PIF_VALUE: 16
PIF_VALUE: 17
PIF_VALUE: 20
PIF_VALUE: 17
PIF_VALUE: 19
PIF_VALUE: 17
PIF_VALUE: 21
PIF_VALUE: 18
PIF_VALUE: 23
PIF_VALUE: 17
PIF_VALUE: 19
PIF_VALUE: 20
PIF_VALUE: 16
PIF_VALUE: 17
PIF_VALUE: 17
PIF_VALUE: 18
PIF_VALUE: 17

## 2022-05-13 ASSESSMENT — PAIN SCALES - GENERAL
PAINLEVEL_OUTOF10: 0

## 2022-05-13 NOTE — PROGRESS NOTES
Hospitalist Progress Note  [facility]     Patient: Mindy Christianson  : 1970  MRN: [id number]  Code Status: Full Code    Hospital Day: 19   Date of Service: 2022    Subjective:   Patient seen and examined. Trached and sedated.     Past Medical History:   Diagnosis Date    Acute ischemic stroke ([facility] [address] 2022    Arthritis     Chronic kidney disease, stage 5, kidney failure (HCC)     Closed fracture of right shoulder girdle, with routine healing, subsequent encounter     DM (diabetes mellitus) type II controlled with renal manifestation ([facility] [address] 1993    Gastroparesis     GERD (gastroesophageal reflux disease)     Hemodialysis patient ([facility] [address]     dialysis on tues, thur, and sat at [facility]    Hypertension     Hypothyroid     Nasal congestion     recent    Neuropathy     Noncompliance with medications     Palliative care patient 10/08/2019    Restless leg syndrome        Past Surgical History:   Procedure Laterality Date    BREAST SURGERY Left     infected milk gland removed    BREAST SURGERY Right 2021    WEDGE EXCISION RIGHT BREAST DUCT WITH LACRIMAL DUCT PROBE, PEC BLOCK performed by Lobo Pascual MD at [address] [place] [address], LAPAROSCOPIC          COLONOSCOPY Left 2020    Dr Olympia Essex hepatic flexure-Severe tortuosity with severe spasming, 1 yr recall    DIALYSIS FISTULA CREATION Left 2019    REVISION LEFT UPPER EXTREMITY AV ACCESS WITH LEFT BRACHIAL ARTERY TO LEFT AXILLARY VEIN WITH  INTERPOSITIONAL ARTEGRAFT   performed by Joshua Dos Santos MD at [address]      GASTROSTOMY TUBE PLACEMENT N/A 5/10/2022    PERCUTANEOUS ENDOSCOPIC GASTROSTOMY TUBE PLACEMENT, performed by Rosa Elena Huang MD at [address] Right 2019    INSERTION OF RIGHT INTERNAL JUGULAR HEMODIALYSIS CATHETER performed by Joshua Dos Santos MD at [address] [facility]  2018    1.  Moderately increased stainable iron identified by special stain in Kupffer cells and occasional hepatocytes. Negative for evidence of significant portal or lobular inflammation. Negative for evidence of significant fibrosis    MA COLONOSCOPY W/BIOPSY SINGLE/MULTIPLE N/A 10/20/2017    Dr Von Caruso prep-Tubular AP (-) dysplasia x 2--3 yr recall    MA EGD TRANSORAL BIOPSY SINGLE/MULTIPLE N/A 2016    Dr Heena Trejo EGD TRANSORAL BIOPSY SINGLE/MULTIPLE N/A 10/20/2017    Dr Ernie Diaz (-)    TRACHEOSTOMY  5/10/2022    TRACHEOSTOMY PERCUTANEOUS performed by Peggy Mar MD at [address] VASCULAR SURGERY Left 2016    fistula by Dr Wandy Fink at [address]  10/02/2017    [facility]. Left upper fistulograms/venograms, balloon angioplasty cephalic vein arch [id number] conquest.    VASCULAR SURGERY  2018    FISTULOGRAM    VASCULAR SURGERY  10/29/2018    SJS. Left upper fistulograms/venograms    VASCULAR SURGERY  2018    SJS. Arch aortogram,left upper arteriograms.  VASCULAR SURGERY  2019    [facility]. Removal of tunneled dialyis catheter right internal jugular vein.  VASCULAR SURGERY  2019    [facility]. Left upper extremity fistulogram including venography to the superior vena cava. Balloon angioplasty left subclavian vein with 10mm x 40mm conquest balloon. Balloon angioplasty mid/proximal upper arm cephalic vein with 50JL x 40mm conquest balloon. Venograms after balloon angioplasty. Stent left mid/proximal upper arm cephalic vein stenosis fluency 10mm x 60mm self-expanding covered stent. Balloon     VASCULAR SURGERY  2019    cont angioplasty stent with 10mm x 40mm conquest balloon. Completion venograms left upper extremity.        Family History   Problem Relation Age of Onset    Colon Cancer Mother          at 63    Colon Polyps Mother     Lung Cancer Father          at 66    Colon Polyps Father     Stomach Cancer Sister     Breast Cancer Sister 27    Depression Daughter 25 committed suicide    Liver Cancer Neg Hx     Liver Disease Neg Hx     Esophageal Cancer Neg Hx     Rectal Cancer Neg Hx        Social History     Socioeconomic History    Marital status: Single     Spouse name: Not on file    Number of children: 2    Years of education: Not on file    Highest education level: Not on file   Occupational History    Not on file   Tobacco Use    Smoking status: Current Every Day Smoker     Packs/day: 0.50     Years: 33.00     Pack years: 16.50     Types: Cigarettes    Smokeless tobacco: Never Used    Tobacco comment: NOW at 1/4 PD, started at age 24 and has averaged 2 PPD   Vaping Use    Vaping Use: Never used   Substance and Sexual Activity    Alcohol use: No    Drug use: Not Currently     Types: Marijuana Alyson Dasen)    Sexual activity: Not Currently     Partners: Male   Other Topics Concern    Not on file   Social History Narrative    Not on file     Social Determinants of Health     Financial Resource Strain:     Difficulty of Paying Living Expenses: Not on file   Food Insecurity:     Worried About Running Out of Food in the Last Year: Not on file    Kendy of Food in the Last Year: Not on file   Transportation Needs:     Lack of Transportation (Medical): Not on file    Lack of Transportation (Non-Medical):  Not on file   Physical Activity:     Days of Exercise per Week: Not on file    Minutes of Exercise per Session: Not on file   Stress:     Feeling of Stress : Not on file   Social Connections:     Frequency of Communication with Friends and Family: Not on file    Frequency of Social Gatherings with Friends and Family: Not on file    Attends Orthodox Services: Not on file    Active Member of Clubs or Organizations: Not on file    Attends Club or Organization Meetings: Not on file    Marital Status: Not on file   Intimate Partner Violence:     Fear of Current or Ex-Partner: Not on file    Emotionally Abused: Not on file    Physically Abused: Not on file    Sexually Abused: Not on file   Housing Stability:     Unable to Pay for Housing in the Last Year: Not on file    Number of Places Lived in the Last Year: Not on file    Unstable Housing in the Last Year: Not on file       Current Facility-Administered Medications   Medication Dose Route Frequency Provider Last Rate Last Admin    gentamicin (GARAMYCIN) 187.2 mg in dextrose 5 % 100 mL IVPB  3 mg/kg IntraVENous Once Raydell Fought, DO        vancomycin (VANCOCIN) 750 mg in dextrose 5 % 250 mL IVPB  750 mg IntraVENous Once Raydell Fought, DO        collagenase ointment   Topical Daily Edilma Smith MD   Given at 05/13/22 0755    sodium chloride flush 0.9 % injection 5-40 mL  5-40 mL IntraVENous 2 times per day Hayden Camarillo MD        sodium chloride flush 0.9 % injection 5-40 mL  5-40 mL IntraVENous PRN Hayden Camarillo MD        0.9 % sodium chloride infusion   IntraVENous PRN Hayden Camarillo MD        sodium chloride flush 0.9 % injection 5-40 mL  5-40 mL IntraVENous 2 times per day Shemar Scruggs MD   10 mL at 05/12/22 2115    sodium chloride flush 0.9 % injection 5-40 mL  5-40 mL IntraVENous PRN Shemar Scruggs MD        0.9 % sodium chloride infusion   IntraVENous PRN Shemar Scruggs MD        HYDROmorphone HCl PF (DILAUDID) injection 0.25 mg  0.25 mg IntraVENous Q5 Min PRN Shemar Scruggs MD        HYDROmorphone HCl PF (DILAUDID) injection 0.5 mg  0.5 mg IntraVENous Q5 Min PRN Shemar Scruggs MD        vancomycin Suezanne Rising) intermittent dosing (placeholder)   Other RX Mylo Schwab, MD        aminoglycoside intermittent dosing (placeholder)   Other RX Placeholder Hayden Camarillo MD        acetylcysteine (MUCOMYST) 20 % solution 600 mg  600 mg Inhalation BID Hayedn Camarillo MD   600 mg at 05/13/22 5982    ipratropium-albuterol (DUONEB) nebulizer solution 1 ampule  1 ampule Inhalation Q4H Hayden Camarillo MD   1 ampule at 05/13/22 1024    insulin glargine (LANTUS) injection vial 20 Units  20 Units SubCUTAneous BID Sergey Logan MD   20 Units at 05/12/22 2115    hydrALAZINE (APRESOLINE) injection 10 mg  10 mg IntraVENous Q6H PRN Sergey Logan MD   10 mg at 05/13/22 0404    aspirin chewable tablet 81 mg  81 mg Oral Daily Sergey Logan MD   81 mg at 05/13/22 0753    racepinephrine HCl (VAPONEFPRIN) 2.25 % nebulizer solution NEBU 11.25 mg  11.25 mg Nebulization Q4H PRN Sergey Logan MD   11.25 mg at 05/03/22 1408    sodium chloride nebulizer 0.9 % solution 3 mL  3 mL Nebulization Q4H PRN Sergey Logan MD        propofol injection  5-50 mcg/kg/min IntraVENous Continuous Sergey Logan MD 9 mL/hr at 05/13/22 1102 25 mcg/kg/min at 05/13/22 1102    heparin (porcine) injection 5,000 Units  5,000 Units SubCUTAneous TID Sergey Logan MD   5,000 Units at 05/13/22 [facility]   Does not apply PRN Sergey Logan MD        menthol-zinc oxide (CALMOSEPTINE) 0.44-20.6 % ointment   Topical BID Maritza Allred MD   Given at 05/13/22 0531    sodium phosphate 10.5 mmol in sodium chloride 0.9 % 250 mL IVPB  0.16 mmol/kg IntraVENous PRN Sergey Logan MD        Or    sodium phosphate 21 mmol in sodium chloride 0.9 % 250 mL IVPB  0.32 mmol/kg IntraVENous PRN Sergey Logan MD        insulin lispro (HUMALOG) injection vial 0-12 Units  0-12 Units SubCUTAneous TID WC Sergey Logan MD   4 Units at 05/12/22 1214    insulin lispro (HUMALOG) injection vial 0-6 Units  0-6 Units SubCUTAneous Nightly Sergey Logan MD   1 Units at 05/12/22 2115    hydrALAZINE (APRESOLINE) tablet 100 mg  100 mg Oral 3 times per day Sergey Logan MD   100 mg at 05/13/22 0531    glucagon (rDNA) injection 1 mg  1 mg IntraMUSCular PRN Sergey Logan MD        dextrose 5 % solution  100 mL/hr IntraVENous PRN Sergey Logan MD        glucose chewable tablet 16 g  4 tablet Oral PRN Sergey Logan MD        dextrose bolus (hypoglycemia) 10% 125 mL  125 mL IntraVENous PRN Siva Cardoso MD   Stopped at 05/11/22 2016    Or    dextrose bolus (hypoglycemia) 10% 250 mL  250 mL IntraVENous PRN Siva Cardoso MD   Stopped at 05/10/22 2026    sodium chloride flush 0.9 % injection 5-40 mL  5-40 mL IntraVENous 2 times per day Siva Cardoso MD   10 mL at 05/12/22 0912    sodium chloride flush 0.9 % injection 5-40 mL  5-40 mL IntraVENous PRN Siva Cardoso MD        0.9 % sodium chloride infusion   IntraVENous PRN Siva Cardoso MD   Stopped at 05/03/22 2005    LORazepam (ATIVAN) injection 1 mg  1 mg IntraVENous Q5 Min PRN Siva Cardoso MD        ondansetron (ZOFRAN-ODT) disintegrating tablet 4 mg  4 mg Oral Q8H PRN Siva Cardoso MD        Or    ondansetron [facility]) injection 4 mg  4 mg IntraVENous Q6H PRN Siva Cardoso MD        polyethylene glycol [facility]) packet 17 g  17 g Oral Daily PRN Siva Cardoso MD        acetaminophen (TYLENOL) tablet 650 mg  650 mg Oral Q6H PRN Siva Cardoso MD   650 mg at 04/25/22 0035    Or    acetaminophen (TYLENOL) suppository 650 mg  650 mg Rectal Q6H PRN Siva Cardoso MD        levETIRAcetam (KEPPRA) 500 mg/100 mL IVPB  500 mg IntraVENous Q12H Siva Cardoso MD   Stopped at 05/13/22 0059    chlorhexidine (PERIDEX) 0.12 % solution 15 mL  15 mL Mouth/Throat BID Siva Cardoso MD   15 mL at 05/13/22 0755    famotidine (PEPCID) 20 mg in sodium chloride (PF) 10 mL injection  20 mg IntraVENous Daily Siva Cardoso MD   20 mg at 05/13/22 8410    magic butt cream   Topical Q4H PRN Siva Cardoso MD   Given at 05/03/22 0940    magnesium sulfate 1000 mg in dextrose 5% 100 mL IVPB  1,000 mg IntraVENous PRN Siva Cardoso MD        potassium bicarb-citric acid (EFFER-K) effervescent tablet 40 mEq  40 mEq Oral PRN Siva Cradoso MD        Or    potassium chloride 10 mEq/100 mL IVPB (Peripheral Line)  10 mEq IntraVENous PRN Siva Cardoso MD   Stopped at 04/25/22 0936         sodium chloride      sodium chloride      propofol 25 mcg/kg/min (05/13/22 1102)    dextrose      sodium chloride Stopped (05/03/22 2005)        Objective:   BP (!) 165/59   Pulse 93   Temp 97.6 °F (36.4 °C) (Temporal)   Resp 14   Ht 5' 6\" (1.676 m)   Wt 131 lb 6.4 oz (59.6 kg)   SpO2 98%   BMI 21.21 kg/m²     General: no acute distress  HEENT: normocephalic, atraumatic  Lungs: faint rhonchi  Cardiovascular: s1 and s2 normal  Abdomen: soft, positive bowel sounds  Extremities: no cyanosis   Neuro: trached and sedated     Recent Labs     05/11/22 0432 05/12/22 0144 05/13/22 0212   WBC 10.4 7.7 10.2   RBC 3.24* 3.09* 2.88*   HGB 9.3* 8.9* 8.3*   HCT 30.6* 29.6* 27.4*   MCV 94.4 95.8 95.1   MCH 28.7 28.8 28.8   MCHC 30.4* 30.1* 30.3*    322 321     Recent Labs     05/11/22 0432 05/12/22 0144 05/13/22 0212   * 135* 132*   K 5.2* 4.0 4.7   ANIONGAP 24* 18 20*   CL 90* 93* 89*   CO2 21* 24 23   BUN 57* 34* 58*   CREATININE 3.4* 2.4* 3.3*   GLUCOSE 96 96 96   CALCIUM 9.2 9.0 9.3     Recent Labs     05/11/22 0432 05/12/22 0144 05/13/22 0212   MG 2.6 2.4 2.6   PHOS 6.9* 5.0* 7.2*     Recent Labs     05/11/22 0432 05/12/22 0144 05/13/22 0212   AST 15 18 13   ALT 7 9 8   BILITOT 0.3 0.3 0.3   ALKPHOS 101 100 98     No results for input(s): PH, PO2, PCO2, HCO3, BE, O2SAT in the last 72 hours. No results for input(s): TROPONINI in the last 72 hours. Recent Labs     05/11/22  0432 05/12/22  0144 05/13/22  0212   INR 1.07 1.12 1.16     No results for input(s): LACTA in the last 72 hours. Intake/Output Summary (Last 24 hours) at 5/13/2022 1145  Last data filed at 5/13/2022 1028  Gross per 24 hour   Intake 2153.61 ml   Output 300 ml   Net 1853.61 ml       No results found.      Assessment and Plan:   Acute left MCA infarct  Per MRI brain 4/29/2022  Neurology following  Neurochecks  Meds per neurology     Seizure  Neurology following  AEDs per neurology  Seizure precautions     MRSA/Raoultella ornithinolytica/Proteus vulgaris respiratory culture positivity  Empiric agents on board per sensitivities     Ventilator dependent acute respiratory failure  Secondary to above processes  Multiple failed SBT  Failed extubation 5/30/2022  S/p trach/PEG 5/10/2022 per Dr. Ludin Joya     ESRD on HD  Renal following     Medical noncompliance       C. difficile infection  Completed oral vancomycin course     Tobacco dependence       HHS  Since resolved  History of IDDM2  Meds on board  Follow Accu-Cheks/electrolytes  HbA1c 9.4  Poor control     DVT prophylaxis  Heparin    Total critical care time: 40 minutes    Laurie Villeda MD   5/13/2022 11:45 AM

## 2022-05-13 NOTE — PROGRESS NOTES
[facility] Neurology  [address] [place] [address] Drive, [address]  [place], [place] [address]  Phone [phone]     Neurology Progress Note  2022 1:07 PM  Information:   Patient Name: Papa Holland  :   1970  Age:   47 y.o. MRN:   [id number]  Account #:  [de-identified]  Admit Date:   2022  Today:  22     ADMIT DX:   Acute ischemic stroke [facility])    Subjective:       Papa Holland is a 46 YO woman with DM and ESRD on HD who was admitted  after an unresponsive episode at home and a generalized convulsive seizure in the ER. Vania Reeder had a very high blood sugar and respiratory failure requiring intubation. Vania Reeder has had little recovery.  She had an MRI head  that showed an acute left MCA distribution infarct.  Intermittently when sedation is held, she will have minor responsiveness.  She had Trach/PEG 5/10.     Interval History:   She remains sedated and minimally responsive on a ventilator in the CCU.  She is presently on 25 mcg/kg/hr Propofol.      Objective:     Past Medical History:  Past Medical History:   Diagnosis Date    Acute ischemic stroke ([facility] [address] 2022    Arthritis     Chronic kidney disease, stage 5, kidney failure (HCC)     Closed fracture of right shoulder girdle, with routine healing, subsequent encounter     DM (diabetes mellitus) type II controlled with renal manifestation ([address] 1993    Gastroparesis     GERD (gastroesophageal reflux disease)     Hemodialysis patient ([facility] [address]     dialysis on , and sat at [facility] clinic    Hypertension     Hypothyroid     Nasal congestion     recent    Neuropathy     Noncompliance with medications     Palliative care patient 10/08/2019    Restless leg syndrome        Past Surgical History:   Procedure Laterality Date    BREAST SURGERY Left     infected milk gland removed    BREAST SURGERY Right 2021    WEDGE EXCISION RIGHT BREAST DUCT WITH LACRIMAL DUCT PROBE, PEC BLOCK performed by Qamar Purvis MD at [address] [place] [address] LAPAROSCOPIC      2008    COLONOSCOPY Left 9/17/2020    Dr Hussain Lay hepatic flexure-Severe tortuosity with severe spasming, 1 yr recall    DIALYSIS FISTULA CREATION Left 1/25/2019    REVISION LEFT UPPER EXTREMITY AV ACCESS WITH LEFT BRACHIAL ARTERY TO LEFT AXILLARY VEIN WITH  INTERPOSITIONAL ARTEGRAFT   performed by Katina Banda MD at [address]  2012    GASTROSTOMY TUBE PLACEMENT N/A 5/10/2022    PERCUTANEOUS ENDOSCOPIC GASTROSTOMY TUBE PLACEMENT, performed by Ama Barragan MD at [address] [place] [address] Right 1/25/2019    INSERTION OF RIGHT INTERNAL JUGULAR HEMODIALYSIS CATHETER performed by Katina Banda MD at [address] [facility]  08/17/2018    1.  Moderately increased stainable iron identified by special stain in Kupffer cells and occasional hepatocytes. Negative for evidence of significant portal or lobular inflammation. Negative for evidence of significant fibrosis    RI COLONOSCOPY W/BIOPSY SINGLE/MULTIPLE N/A 10/20/2017    Dr Blaise Gupta prep-Tubular AP (-) dysplasia x 2--3 yr recall    RI EGD TRANSORAL BIOPSY SINGLE/MULTIPLE N/A 12/27/2016    Dr Diann Valentin EGD TRANSORAL BIOPSY SINGLE/MULTIPLE N/A 10/20/2017    Dr Mcnair Blades (-)    TRACHEOSTOMY  5/10/2022    TRACHEOSTOMY PERCUTANEOUS performed by Ama Barragan MD at [address] [facility] [address] VASCULAR SURGERY Left 2016    fistula by Dr Yeimi Reagan at [address]  10/02/2017    SJS. Left upper fistulograms/venograms, balloon angioplasty cephalic vein arch [id number] conquest.    VASCULAR SURGERY  08/2018    FISTULOGRAM    VASCULAR SURGERY  10/29/2018    SJS. Left upper fistulograms/venograms    VASCULAR SURGERY  12/19/2018    SJS. Arch aortogram,left upper arteriograms.  VASCULAR SURGERY  03/06/2019    SJS. Removal of tunneled dialyis catheter right internal jugular vein.  VASCULAR SURGERY  08/28/2019    SJS. Left upper extremity fistulogram including venography to the superior vena cava. Balloon angioplasty left subclavian vein with 10mm x 40mm conquest balloon. Balloon angioplasty mid/proximal upper arm cephalic vein with 92TY x 40mm conquest balloon. Venograms after balloon angioplasty. Stent left mid/proximal upper arm cephalic vein stenosis fluency 10mm x 60mm self-expanding covered stent. Balloon     VASCULAR SURGERY  2019    cont angioplasty stent with 10mm x 40mm conquest balloon. Completion venograms left upper extremity.        Family History   Problem Relation Age of Onset    Colon Cancer Mother          at 63    Colon Polyps Mother     Lung Cancer Father          at 79    Colon Polyps Father     Stomach Cancer Sister     Breast Cancer Sister 27    Depression Daughter 25        committed suicide    Liver Cancer Neg Hx     Liver Disease Neg Hx     Esophageal Cancer Neg Hx     Rectal Cancer Neg Hx        Social History     Socioeconomic History    Marital status: Single     Spouse name: Not on file    Number of children: 2    Years of education: Not on file    Highest education level: Not on file   Occupational History    Not on file   Tobacco Use    Smoking status: Current Every Day Smoker     Packs/day: 0.50     Years: 33.00     Pack years: 16.50     Types: Cigarettes    Smokeless tobacco: Never Used    Tobacco comment: NOW at 1/4 PD, started at age 24 and has averaged 2 PPD   Vaping Use    Vaping Use: Never used   Substance and Sexual Activity    Alcohol use: No    Drug use: Not Currently     Types: Marijuana Myrtis Regulus)    Sexual activity: Not Currently     Partners: Male   Other Topics Concern    Not on file   Social History Narrative    Not on file     Social Determinants of Health     Financial Resource Strain:     Difficulty of Paying Living Expenses: Not on file   Food Insecurity:     Worried About Running Out of Food in the Last Year: Not on file    Kendy of Food in the Last Year: Not on file   Transportation Needs:     Lack of Transportation (Medical): Not on file    Lack of Transportation (Non-Medical):  Not on file   Physical Activity:     Days of Exercise per Week: Not on file    Minutes of Exercise per Session: Not on file   Stress:     Feeling of Stress : Not on file   Social Connections:     Frequency of Communication with Friends and Family: Not on file    Frequency of Social Gatherings with Friends and Family: Not on file    Attends Tenriism Services: Not on file    Active Member of Clubs or Organizations: Not on file    Attends Club or Organization Meetings: Not on file    Marital Status: Not on file   Intimate Partner Violence:     Fear of Current or Ex-Partner: Not on file    Emotionally Abused: Not on file    Physically Abused: Not on file    Sexually Abused: Not on file   Housing Stability:     Unable to Pay for Housing in the Last Year: Not on file    Number of Places Lived in the Last Year: Not on file    Unstable Housing in the Last Year: Not on file       Medications:   sodium chloride      propofol 25 mcg/kg/min (05/13/22 1102)    dextrose        gentamicin  3 mg/kg IntraVENous Once    vancomycin  750 mg IntraVENous Once    collagenase   Topical Daily    sodium chloride flush  5-40 mL IntraVENous 2 times per day    vancomycin (VANCOCIN) intermittent dosing (placeholder)   Other RX Placeholder    aminoglycoside intermittent dosing (placeholder)   Other RX Placeholder    acetylcysteine  600 mg Inhalation BID    ipratropium-albuterol  1 ampule Inhalation Q4H    insulin glargine  20 Units SubCUTAneous BID    aspirin  81 mg Oral Daily    heparin (porcine)  5,000 Units SubCUTAneous TID    menthol-zinc oxide   Topical BID    insulin lispro  0-12 Units SubCUTAneous TID WC    insulin lispro  0-6 Units SubCUTAneous Nightly    hydrALAZINE  100 mg Oral 3 times per day    levetiracetam  500 mg IntraVENous Q12H    chlorhexidine  15 mL Mouth/Throat BID    famotidine (PEPCID) injection  20 mg IntraVENous Daily       Diagnostic Studies:  Reviewed all labs/diagnositcs since last 24hrs:  Recent Results (from the past 24 hour(s))   POCT Glucose    Collection Time: 05/12/22  4:30 PM   Result Value Ref Range    POC Glucose 140 (H) 70 - 99 mg/dl    Performed on AccuChek    POCT Glucose    Collection Time: 05/12/22  8:26 PM   Result Value Ref Range    POC Glucose 152 (H) 70 - 99 mg/dl    Performed on AccuChek    Vancomycin Level, Random    Collection Time: 05/13/22  2:12 AM   Result Value Ref Range    Vancomycin Rm 22.6 (HH) ug/mL   Gentamicin Level, Random    Collection Time: 05/13/22  2:12 AM   Result Value Ref Range    Gentamicin Rm 1.8 ug/mL    Gentamicin Dose Amount Unknown    CBC with Auto Differential    Collection Time: 05/13/22  2:12 AM   Result Value Ref Range    WBC 10.2 4.8 - 10.8 K/uL    RBC 2.88 (L) 4.20 - 5.40 M/uL    Hemoglobin 8.3 (L) 12.0 - 16.0 g/dL    Hematocrit 27.4 (L) 37.0 - 47.0 %    MCV 95.1 81.0 - 99.0 fL    MCH 28.8 27.0 - 31.0 pg    MCHC 30.3 (L) 33.0 - 37.0 g/dL    RDW 18.8 (H) 11.5 - 14.5 %    Platelets 682 332 - 683 K/uL    MPV 9.5 9.4 - 12.3 fL    Neutrophils % 78.7 (H) 50.0 - 65.0 %    Lymphocytes % 10.1 (L) 20.0 - 40.0 %    Monocytes % 8.8 0.0 - 10.0 %    Eosinophils % 1.3 0.0 - 5.0 %    Basophils % 0.6 0.0 - 1.0 %    Neutrophils Absolute 8.0 (H) 1.5 - 7.5 K/uL    Immature Granulocytes # 0.1 K/uL    Lymphocytes Absolute 1.0 (L) 1.1 - 4.5 K/uL    Monocytes Absolute 0.90 0.00 - 0.90 K/uL    Eosinophils Absolute 0.10 0.00 - 0.60 K/uL    Basophils Absolute 0.10 0.00 - 0.20 K/uL   Comprehensive Metabolic Panel    Collection Time: 05/13/22  2:12 AM   Result Value Ref Range    Sodium 132 (L) 136 - 145 mmol/L    Potassium 4.7 3.5 - 5.0 mmol/L    Chloride 89 (L) 98 - 111 mmol/L    CO2 23 22 - 29 mmol/L    Anion Gap 20 (H) 7 - 19 mmol/L    Glucose 96 74 - 109 mg/dL    BUN 58 (H) 6 - 20 mg/dL    CREATININE 3.3 (H) 0.5 - 0.9 mg/dL    GFR Non-African American 15 (A) >60    GFR  American 18 (L) >59    Calcium 9.3 8.6 - 10.0 mg/dL    Total Protein 6.6 6.6 - 8.7 g/dL    Albumin 2.8 (L) 3.5 - 5.2 g/dL    Total Bilirubin 0.3 0.2 - 1.2 mg/dL    Alkaline Phosphatase 98 35 - 104 U/L    ALT 8 5 - 33 U/L    AST 13 5 - 32 U/L   Magnesium    Collection Time: 05/13/22  2:12 AM   Result Value Ref Range    Magnesium 2.6 1.6 - 2.6 mg/dL   Phosphorus    Collection Time: 05/13/22  2:12 AM   Result Value Ref Range    Phosphorus 7.2 (H) 2.5 - 4.5 mg/dL   Protime-INR    Collection Time: 05/13/22  2:12 AM   Result Value Ref Range    Protime 14.8 (H) 12.0 - 14.6 sec    INR 1.16 0.88 - 1.18   POCT Glucose    Collection Time: 05/13/22  7:45 AM   Result Value Ref Range    POC Glucose 68 (L) 70 - 99 mg/dl    Performed on AccuChek    POCT Glucose    Collection Time: 05/13/22 10:00 AM   Result Value Ref Range    POC Glucose 72 70 - 99 mg/dl    Performed on AccuChek    POCT Glucose    Collection Time: 05/13/22 11:32 AM   Result Value Ref Range    POC Glucose 77 70 - 99 mg/dl    Performed on AccuChek        Diet:  Diet NPO Exceptions are: Sips of Water with Meds  ADULT TUBE FEEDING; PEG; Peptide Based High Protein; Continuous; 20; Yes; 10; Q 6 hours; 48; 0; Other (specify); no free water flush    Examination:  Vitals: BP (!) 165/59   Pulse 93   Temp 97.6 °F (36.4 °C) (Temporal)   Resp 14   Ht 5' 6\" (1.676 m)   Wt 131 lb 6.4 oz (59.6 kg)   SpO2 98%   BMI 21.21 kg/m²      Intake/Output Summary (Last 24 hours) at 5/13/2022 1307  Last data filed at 5/13/2022 1028  Gross per 24 hour   Intake 1969.02 ml   Output 300 ml   Net 1669.02 ml     General appearance:  She is a chronically ill appearing woman who is intubated, sedated, and unresponsive in the CCU. HEENT: normocephalic, atraumatic.   Lungs: clear to auscultation bilaterally  Heart: regular rate and rhythm, S1, S2 normal, no murmur, click, rub or gallop  Extremities: extremities normal, atraumatic, no cyanosis or edema  Neurologic:   She opens eyes to voice. Candice Radford

## 2022-05-13 NOTE — CARE COORDINATION
Left VM request for P2P with attending appealing the denial for [facility] services. Also left attending call back number to complete P2P if able.      [phone]  Ref# [id number]

## 2022-05-13 NOTE — PROGRESS NOTES
Aminoglycoside: Gentamicin  Day: 6  Current Dosing: Pulse dosing for HD    Temp max: 98.9    Recent Labs     05/12/22  0144 05/13/22  0212   BUN 34* 58*       Recent Labs     05/12/22  0144 05/13/22  0212   CREATININE 2.4* 3.3*       Recent Labs     05/12/22  0144 05/13/22  0212   WBC 7.7 10.2       Estimated Creatinine Clearance: 18 mL/min (A) (based on SCr of 3.3 mg/dL (H)). Culture Date Source Results   05/06/22 Respiratory MRSA; Raoultella ornithinolytica; Proteus vulgaris       ASSESSMENT/PLAN:    Pre-HD random level on 05/13 at 0212 = 1.8. Will order Gentamicin (3 mg/kg) 187.2 mg IV x 1 dose to be given after HD today.     Electronically signed by RAHUL Robert [facility] on 5/13/2022 at 3:25 AM

## 2022-05-13 NOTE — PROGRESS NOTES
Received call from [employer]  [facility] [employer], a kidney care company, who had been following patient prior to admission. They also help coordinate care and person on phone gave me his number, [phone], if care management needed any help with coordinating care for patient. He was inquiring about her admission date to make sure that insurance had given him correct one. He also gave me their hotline/switchboard number which is [phone].      Electronically signed by Julito Bass RN on 5/13/2022 at 8:35 AM\

## 2022-05-13 NOTE — PROGRESS NOTES
[facility] Pharmacokinetic Monitoring Service - Vancomycin    Consulting Provider: Dr. Abhijeet Queen   Indication: pneumonia  Target Concentration: Pre-Dialysis Concentration 21-24 mg/L  Day of Therapy: 6  Additional Antimicrobials: Gentamicin    Pertinent Laboratory Values: Wt Readings from Last 1 Encounters:   05/12/22 137 lb 8 oz (62.4 kg)     Temp Readings from Last 1 Encounters:   05/13/22 98.8 °F (37.1 °C) (Temporal)     Recent Labs     05/12/22  0144 05/13/22  0212   CREATININE 2.4* 3.3*   WBC 7.7 10.2     Procalcitonin: not ordered at this time    Pertinent Cultures:  Culture Date Source Results   05/06/22 Respiratory MRSA; Raoultella ornithinolytica; Proteus vulgaris     MRSA Nasal Swab: showed MRSA positive result on 05/09    Recent vancomycin administrations                     vancomycin (VANCOCIN) 750 mg in dextrose 5 % 250 mL IVPB (mg) 750 mg New Bag 05/11/22 1639                    Assessment:  Date/Time Current Dose Concentration Dialysis Session   05/13 at 0212 Vancomycin pulse dosing for HD 22.6 MWF     Plan:  Concentration-guided dosing due to renal impairment and intermittent hemodialysis   Current dosing regimen is therapeutic   Continue current dose of Vancomycin 750 mg IV x 1 dose after scheduled HD today.   Vancomycin concentration ordered for 05/16 @ 0400, prior to HD session   Pharmacy will continue to monitor patient and adjust therapy as indicated    Thank you for the consult,ROXANNE Del Valle, [place]  5/13/2022 3:36 AM

## 2022-05-13 NOTE — PROGRESS NOTES
Nutrition Assessment     Type and Reason for Visit: Reassess    Malnutrition Assessment:  Malnutrition Status: Severe malnutrition    Nutrition Assessment:  Pt continues to tolerate EN at goal rate of 48 ml/hr. H2O flushes off. Aware glucose low this am, 68. Continue current tube feeding per orders.     Estimated Daily Nutrient Needs:  Energy (kcal):  3633-2540 kcals (20-25 kcals/kg) Weight Used for Energy Requirements: Current     Protein (g):  90g Weight Used for Protein Requirements: Current        Fluid (ml/day):  6697-7402 ml Method Used for Fluid Requirements: 1 ml/kcal    Nutrition Related Findings:   PEG, trach, dialysis Wound Type: Pressure Injury,Unstageable,Surgical Incision    Current Nutrition Therapies:    Diet NPO Exceptions are: Sips of Water with Meds  ADULT TUBE FEEDING; PEG; Peptide Based High Protein; Continuous; 20; Yes; 10; Q 6 hours; 48; 0; Other (specify); no free water flush    Anthropometric Measures:  · Height: 5' 6\" (167.6 cm)  · Current Body Wt: 131 lb (59.4 kg)   · BMI: 21.2    Nutrition Diagnosis:   · Inadequate oral intake related to acute injury/trauma,impaired respiratory function as evidenced by NPO or clear liquid status due to medical condition,intubation,nutrition support - enteral nutrition    Nutrition Interventions:   Food and/or Nutrient Delivery: Continue NPO,Continue Current Tube Feeding  Nutrition Education/Counseling: No recommendation at this time  Coordination of Nutrition Care: Continue to monitor while inpatient  Plan of Care discussed with: nursing    Goals:  Previous Goal Met: Progressing toward Goal(s)  Goals: Meet at least 75% of estimated needs,Tolerate nutrition support at goal rate       Nutrition Monitoring and Evaluation:   Behavioral-Environmental Outcomes: None Identified  Food/Nutrient Intake Outcomes: Enteral Nutrition Intake/Tolerance  Physical Signs/Symptoms Outcomes: Biochemical Data,Fluid Status or Edema,Weight,Skin,Nutrition Focused Physical Findings    Discharge Planning:    Enteral Nutrition     Melvina De Oliveira, MS, RD, LD  Contact: [phone]

## 2022-05-13 NOTE — PROGRESS NOTES
Nephrology ([facility])  Progress Note    Patient:  Maia Farias  YOB: 1970  Date of Service: 5/13/2022  MRN: [id number]   Acct: [de-identified]   Primary Care Physician: ROMINA Denny  Advance Directive: Full Code  Admit Date: 4/24/2022       Hospital Day: 23  Referring Provider: Carlyn Dennis MD    Patient Seen, Chart, Consults, Notes, Labs, Radiology studies reviewed. Subjective:  Maia Farias is a 47 y.o. female for whom we were consulted for evaluation and treatment of end-stage renal disease.  Patient also has a history of seizure disorder, type 2 diabetes, hypertension, frequent hospitalization with noncompliance and poorly controlled diabetes.  Patient was brought to the emergency room after she was found to have unresponsiveness and witnessed grand mal seizure. Ernesto Rodrigues was brought in by ambulance.  On arrival in the emergency room, she was found to be severely hyperglycemic and had a urinary tract infection.  Patient was intubated, sedated and treated by neurology for grand mal seizure. Ernesto Rodrigues was also receiving IV antibiotics and insulin drip. Dot Villalpando blood sugar control had significantly improved.  On April 25, she has received emergent hemodialysis treatment for correction of volume status as well as hyponatremia.  She was moved from ICU to CCU. Patient remains on the ventilator and unable to participate in the history and physical examination. She is s/p trach and PEG placement on 5/10. Events reviewed with nursing at the bedside. She is due for dialysis today. Patient is also awaiting to be transferred to [facility].      Dialysis   Pt was seen on RRT  Modality: Hemodialysis  Access: Arterial Venous Fistula  Location: left upper  QB: 450  QD: 700  UF: 3 liters          Allergies:  Penicillins, Lamisil advanced [terbinafine], Reglan [metoclopramide], and Stadol [butorphanol]    Medicines:  Current Facility-Administered Medications   Medication Dose Route Frequency Provider Last Rate Last Admin    gentamicin (GARAMYCIN) 187.2 mg in dextrose 5 % 100 mL IVPB  3 mg/kg IntraVENous Once Gabby Forth, DO        vancomycin (VANCOCIN) 750 mg in dextrose 5 % 250 mL IVPB  750 mg IntraVENous Once Gabby Forth, DO        collagenase ointment   Topical Daily Greyson Salvador MD   Given at 05/13/22 0755    sodium chloride flush 0.9 % injection 5-40 mL  5-40 mL IntraVENous 2 times per day Jean Betancourt MD        sodium chloride flush 0.9 % injection 5-40 mL  5-40 mL IntraVENous PRN Jean Betancourt MD        0.9 % sodium chloride infusion   IntraVENous PRN Jean Betancourt MD        sodium chloride flush 0.9 % injection 5-40 mL  5-40 mL IntraVENous 2 times per day Krista Cuenca MD   10 mL at 05/12/22 2115    sodium chloride flush 0.9 % injection 5-40 mL  5-40 mL IntraVENous PRN Krista Cuenca MD        0.9 % sodium chloride infusion   IntraVENous PRN Krista Cuenca MD        HYDROmorphone HCl PF (DILAUDID) injection 0.25 mg  0.25 mg IntraVENous Q5 Min PRN Krista Cuenca MD        HYDROmorphone HCl PF (DILAUDID) injection 0.5 mg  0.5 mg IntraVENous Q5 Min PRN Krista Cuenca MD        vancomycin Valentino Reza) intermittent dosing (placeholder)   Other RX Mary Regalado MD        aminoglycoside intermittent dosing (placeholder)   Other RX Placeholder Jean Betancourt MD        acetylcysteine (MUCOMYST) 20 % solution 600 mg  600 mg Inhalation BID Jean Betancourt MD   600 mg at 05/13/22 6821    ipratropium-albuterol (DUONEB) nebulizer solution 1 ampule  1 ampule Inhalation Q4H Jean Betancourt MD   1 ampule at 05/13/22 1024    insulin glargine (LANTUS) injection vial 20 Units  20 Units SubCUTAneous BID Jean Betancourt MD   20 Units at 05/12/22 2115    hydrALAZINE (APRESOLINE) injection 10 mg  10 mg IntraVENous Q6H PRN Jean Betancourt MD   10 mg at 05/13/22 7104    aspirin chewable tablet 81 mg  81 mg Oral Daily Jean Betancourt MD   81 mg at 05/13/22 Tretal[place] [address] Gonsalo 1078 racepinephrine HCl (VAPONEFPRIN) 2.25 % nebulizer solution NEBU 11.25 mg  11.25 mg Nebulization Q4H PRN Leeanne Hidalgo MD   11.25 mg at 05/03/22 1408    sodium chloride nebulizer 0.9 % solution 3 mL  3 mL Nebulization Q4H PRN Leeanne Hidalgo MD        propofol injection  5-50 mcg/kg/min IntraVENous Continuous Leeanne Hidalgo MD 9 mL/hr at 05/13/22 1102 25 mcg/kg/min at 05/13/22 1102    heparin (porcine) injection 5,000 Units  5,000 Units SubCUTAneous TID Leeanne Hidalgo MD   5,000 Units at 05/13/22 0753    Peppermint Spirit SPRT   Does not apply PRN Leeanne Hidalgo MD        menthol-zinc oxide (CALMOSEPTINE) 0.44-20.6 % ointment   Topical BID Kim Bobby MD   Given at 05/13/22 0531    sodium phosphate 10.5 mmol in sodium chloride 0.9 % 250 mL IVPB  0.16 mmol/kg IntraVENous PRN Leeanne Hidalgo MD        Or    sodium phosphate 21 mmol in sodium chloride 0.9 % 250 mL IVPB  0.32 mmol/kg IntraVENous PRN Leeanne Hidalgo MD        insulin lispro (HUMALOG) injection vial 0-12 Units  0-12 Units SubCUTAneous TID WC Leeanne Hidalgo MD   4 Units at 05/12/22 1214    insulin lispro (HUMALOG) injection vial 0-6 Units  0-6 Units SubCUTAneous Nightly Leeanne Hidalgo MD   1 Units at 05/12/22 2115    hydrALAZINE (APRESOLINE) tablet 100 mg  100 mg Oral 3 times per day Leeanne Hidalgo MD   100 mg at 05/13/22 0531    glucagon (rDNA) injection 1 mg  1 mg IntraMUSCular PRN Leeanne Hidalgo MD        dextrose 5 % solution  100 mL/hr IntraVENous PRDAVI Hidalgo MD        glucose chewable tablet 16 g  4 tablet Oral PRN Leeanne Hidalgo MD        dextrose bolus (hypoglycemia) 10% 125 mL  125 mL IntraVENous PRDAVI Hidalgo MD   Stopped at 05/11/22 2016    Or    dextrose bolus (hypoglycemia) 10% 250 mL  250 mL IntraVENous PRDAVI Hidalgo MD   Stopped at 05/10/22 2026    sodium chloride flush 0.9 % injection 5-40 mL  5-40 mL IntraVENous 2 times per day Leeanne Hidalgo MD   10 mL at 05/12/22 7413    sodium chloride flush 0.9 % injection 5-40 mL  5-40 mL IntraVENous PRN Emily Wallace MD        0.9 % sodium chloride infusion   IntraVENous PRN Emily Wallace MD   Stopped at 05/03/22 2005    LORazepam (ATIVAN) injection 1 mg  1 mg IntraVENous Q5 Min PRN Emily Wallace MD        ondansetron (ZOFRAN-ODT) disintegrating tablet 4 mg  4 mg Oral Q8H PRN Emily Wallace MD        Or    ondansetron [facility]) injection 4 mg  4 mg IntraVENous Q6H PRN Emily Wallace MD        polyethylene glycol [facility]) packet 17 g  17 g Oral Daily PRN Emily Wallace MD        acetaminophen (TYLENOL) tablet 650 mg  650 mg Oral Q6H PRN Emily Wallace MD   650 mg at 04/25/22 0035    Or    acetaminophen (TYLENOL) suppository 650 mg  650 mg Rectal Q6H PRN Emily Wallace MD        levETIRAcetam (KEPPRA) 500 mg/100 mL IVPB  500 mg IntraVENous Q12H Emily Wallace MD   Stopped at 05/13/22 0059    chlorhexidine (PERIDEX) 0.12 % solution 15 mL  15 mL Mouth/Throat BID Emily Wallace MD   15 mL at 05/13/22 0755    famotidine (PEPCID) 20 mg in sodium chloride (PF) 10 mL injection  20 mg IntraVENous Daily Emily Wallace MD   20 mg at 05/13/22 8617    magic butt cream   Topical Q4H PRN Emily Wallace MD   Given at 05/03/22 0940    magnesium sulfate 1000 mg in dextrose 5% 100 mL IVPB  1,000 mg IntraVENous PRN Emily Wallace MD        potassium bicarb-citric acid (EFFER-K) effervescent tablet 40 mEq  40 mEq Oral PRN Emily Wallace MD        Or    potassium chloride 10 mEq/100 mL IVPB (Peripheral Line)  10 mEq IntraVENous PRN Emily Wallace MD   Stopped at 04/25/22 3168       Past Medical History:  Past Medical History:   Diagnosis Date    Acute ischemic stroke ([address] 4/30/2022    Arthritis     Chronic kidney disease, stage 5, kidney failure (HCC)     Closed fracture of right shoulder girdle, with routine healing, subsequent encounter     DM (diabetes mellitus) type II controlled with renal manifestation ([facility] 06/1993    Gastroparesis     GERD (gastroesophageal reflux disease)     Hemodialysis patient ([facility]     dialysis on tues, thur, and sat at [address] [facility] Hypertension     Hypothyroid     Nasal congestion     recent    Neuropathy     Noncompliance with medications     Palliative care patient 10/08/2019    Restless leg syndrome        Past Surgical History:  Past Surgical History:   Procedure Laterality Date    BREAST SURGERY Left 2008    infected milk gland removed    BREAST SURGERY Right 5/25/2021    WEDGE EXCISION RIGHT BREAST DUCT WITH LACRIMAL DUCT PROBE, PEC BLOCK performed by Yumi Reyna MD at [facility]      2008    COLONOSCOPY Left 9/17/2020    Dr Eris Cherry hepatic flexure-Severe tortuosity with severe spasming, 1 yr recall    DIALYSIS FISTULA CREATION Left 1/25/2019    REVISION LEFT UPPER EXTREMITY AV ACCESS WITH LEFT BRACHIAL ARTERY TO LEFT AXILLARY VEIN WITH  INTERPOSITIONAL ARTEGRAFT   performed by Bronwyn Johnson MD at [address]  2012    GASTROSTOMY TUBE PLACEMENT N/A 5/10/2022    PERCUTANEOUS ENDOSCOPIC GASTROSTOMY TUBE PLACEMENT, performed by Odalis Monzon MD at [address] [place] [address] [address] Right 1/25/2019    INSERTION OF RIGHT INTERNAL JUGULAR HEMODIALYSIS CATHETER performed by Bronwyn Johnson MD at [facility]  08/17/2018    1.  Moderately increased stainable iron identified by special stain in Kupffer cells and occasional hepatocytes. Negative for evidence of significant portal or lobular inflammation.  Negative for evidence of significant fibrosis    [place] COLONOSCOPY W/BIOPSY SINGLE/MULTIPLE N/A 10/20/2017    Dr Reji Herndon prep-Tubular AP (-) dysplasia x 2--3 yr recall    [place] EGD TRANSORAL BIOPSY SINGLE/MULTIPLE N/A 12/27/2016    Dr America Loomis EGD TRANSORAL BIOPSY SINGLE/MULTIPLE N/A 10/20/2017    Dr Anshu Major (-)    TRACHEOSTOMY  5/10/2022    TRACHEOSTOMY PERCUTANEOUS performed by Odalis Monzon MD at [address] [place] [address] LIGATION      1993    VASCULAR SURGERY Left 2016    fistula by Dr Kirti Healy at [address]  10/02/2017    [facility]. Left upper fistulograms/venograms, balloon angioplasty cephalic vein arch 64T57 conquest.    VASCULAR SURGERY  2018    FISTULOGRAM    VASCULAR SURGERY  10/29/2018    [facility]. Left upper fistulograms/venograms    VASCULAR SURGERY  2018    [facility]. Arch aortogram,left upper arteriograms.  VASCULAR SURGERY  2019    [facility]. Removal of tunneled dialyis catheter right internal jugular vein.  VASCULAR SURGERY  2019    [facility]. Left upper extremity fistulogram including venography to the superior vena cava. Balloon angioplasty left subclavian vein with 10mm x 40mm conquest balloon. Balloon angioplasty mid/proximal upper arm cephalic vein with 15JH x 40mm conquest balloon. Venograms after balloon angioplasty. Stent left mid/proximal upper arm cephalic vein stenosis fluency 10mm x 60mm self-expanding covered stent. Balloon     VASCULAR SURGERY  2019    cont angioplasty stent with 10mm x 40mm conquest balloon. Completion venograms left upper extremity.        Family History  Family History   Problem Relation Age of Onset    Colon Cancer Mother          at 63    Colon Polyps Mother     Lung Cancer Father          at 79    Colon Polyps Father     Stomach Cancer Sister     Breast Cancer Sister 27    Depression Daughter 25        committed suicide    Liver Cancer Neg Hx     Liver Disease Neg Hx     Esophageal Cancer Neg Hx     Rectal Cancer Neg Hx        Social History  Social History     Socioeconomic History    Marital status: Single     Spouse name: Not on file    Number of children: 2    Years of education: Not on file    Highest education level: Not on file   Occupational History    Not on file   Tobacco Use    Smoking status: Current Every Day Smoker     Packs/day: 0.50     Years: 33.00     Pack years: 16.50     Types: Cigarettes    Smokeless tobacco: Never Used   [facility] Tobacco comment: NOW at 1/4 PD, started at age 24 and has averaged 2 PPD   Vaping Use    Vaping Use: Never used   Substance and Sexual Activity    Alcohol use: No    Drug use: Not Currently     Types: Marijuana [place])    Sexual activity: Not Currently     Partners: Male   Other Topics Concern    Not on file   Social History Narrative    Not on file     Social Determinants of Health     Financial Resource Strain:     Difficulty of Paying Living Expenses: Not on file   Food Insecurity:     Worried About Running Out of Food in the Last Year: Not on file    Kendy of Food in the Last Year: Not on file   Transportation Needs:     Lack of Transportation (Medical): Not on file    Lack of Transportation (Non-Medical): Not on file   Physical Activity:     Days of Exercise per Week: Not on file    Minutes of Exercise per Session: Not on file   Stress:     Feeling of Stress : Not on file   Social Connections:     Frequency of Communication with Friends and Family: Not on file    Frequency of Social Gatherings with Friends and Family: Not on file    Attends [employer] Services: Not on file    Active Member of [address] [place] [address] or Organizations: Not on file    Attends Club or Organization Meetings: Not on file    Marital Status: Not on file   Intimate Partner Violence:     Fear of Current or Ex-Partner: Not on file    Emotionally Abused: Not on file    Physically Abused: Not on file    Sexually Abused: Not on file   Housing Stability:     Unable to Pay for Housing in the Last Year: Not on file    Number of [place] in the Last Year: Not on file    Unstable Housing in the Last Year: Not on file         Review of Systems:  Reviewed with the patient at the bedside, 6 points reviewed and negative except as noted above. Objective:  Blood pressure (!) 165/59, pulse 93, temperature 97.6 °F (36.4 °C), temperature source Temporal, resp.  rate 14, height 5' 6\" (1.676 m), weight 131 lb 6.4 oz (59.6 kg), SpO2 98 %.    Intake/Output Summary (Last 24 hours) at 5/13/2022 1130  Last data filed at 5/13/2022 1028  Gross per 24 hour   Intake 2153.61 ml   Output 300 ml   Net 1853.61 ml     General: trached, sedated  Chest: bilateral air entry, decreased breath sounds at the bases  CVS: regular rate and rhythm  Abdominal: soft, nontender, normal bowel sounds  Extremities: no cyanosis or edema  Skin: warm and dry without rash    Labs:  BMP:   Recent Labs     05/11/22 0432 05/12/22 0144 05/13/22 0212   * 135* 132*   K 5.2* 4.0 4.7   CL 90* 93* 89*   CO2 21* 24 23   PHOS 6.9* 5.0* 7.2*   BUN 57* 34* 58*   CREATININE 3.4* 2.4* 3.3*   CALCIUM 9.2 9.0 9.3     CBC:   Recent Labs     05/11/22 0432 05/12/22 0144 05/13/22 0212   WBC 10.4 7.7 10.2   HGB 9.3* 8.9* 8.3*   HCT 30.6* 29.6* 27.4*   MCV 94.4 95.8 95.1    322 321     LIVER PROFILE:   Recent Labs     05/11/22 0432 05/12/22 0144 05/13/22 0212   AST 15 18 13   ALT 7 9 8   BILITOT 0.3 0.3 0.3   ALKPHOS 101 100 98     PT/INR:   Recent Labs     05/11/22 0432 05/12/22 0144 05/13/22 0212   PROTIME 13.8 14.4 14.8*   INR 1.07 1.12 1.16     APTT: No results for input(s): APTT in the last 72 hours. BNP:  No results for input(s): BNP in the last 72 hours. Ionized Calcium:No results for input(s): IONCA in the last 72 hours. Magnesium:  Recent Labs     05/11/22 0432 05/12/22 0144 05/13/22 0212   MG 2.6 2.4 2.6     Phosphorus:  Recent Labs     05/11/22 0432 05/12/22 0144 05/13/22 0212   PHOS 6.9* 5.0* 7.2*     HgbA1C: No results for input(s): LABA1C in the last 72 hours. Hepatic:   Recent Labs     05/11/22  0432 05/12/22  0144 05/13/22  0212   ALKPHOS 101 100 98   ALT 7 9 8   AST 15 18 13   PROT 6.3* 6.0* 6.6   BILITOT 0.3 0.3 0.3   LABALBU 3.2* 3.0* 2.8*     Lactic Acid: No results for input(s): LACTA in the last 72 hours. Troponin: No results for input(s): CKTOTAL, CKMB, TROPONINT in the last 72 hours.   ABGs: No results for input(s): PH, PCO2, PO2, HCO3, O2SAT in the last 72 hours. CRP:  No results for input(s): CRP in the last 72 hours. Sed Rate:  No results for input(s): SEDRATE in the last 72 hours. Cultures:   No results for input(s): CULTURE in the last 72 hours. No results for input(s): BC, [place] in the last 72 hours. No results for input(s): CXSURG in the last 72 hours. Radiology reports as per the Radiologist  Radiology: CT Head WO Contrast    Result Date: 4/24/2022  CT HEAD WO CONTRAST 4/24/2022 2:01 PM HISTORY: Altered mental status COMPARISON: CT scan dated 11/18/2021 DOSE LENGTH PRODUCT: 766 mGy cm TECHNIQUE: Helical tomographic images of the brain were obtained without the use of intravenous contrast. Automated exposure control was also utilized to decrease patient radiation dose. FINDINGS: There is no evidence of evolving large vascular territory infarct. No visualized intra-axial or extra-axial hemorrhage. No mass lesion is identified. Normal size and configuration of the ventricular system. The basal cisterns are symmetric. Posterior fossa structures are unremarkable. The included orbits and their contents are unremarkable. Chronic paranasal sinus disease on the left. The visualized osseous structures and overlying soft tissues of the skull and face are unremarkable. 1. No acute intracranial process. Signed by Dr Tasneem Henley    Result Date: 4/24/2022  XR CHEST PORTABLE 4/24/2022 4:27 PM HISTORY: ET tube, enteric tube  Technique: Single AP view of the chest COMPARISONS: 4/24/2022 FINDINGS: Endotracheal tube is identified with tip essentially touching the krysta. Enteric tube courses into the stomach with tip curled in the fundus back towards the gastroesophageal junction. The lungs are clear and well expanded. Heart size is stable. Pulmonary vasculature are nondilated. No pleural effusion or pneumothorax. 1. Endotracheal tube is deep, tip essentially touching the krysta. Lungs are clear and well expanded.  I would recommend 2-3 cm withdrawal for a more optimal positioning. The results of this exam were discussed with Michelle Maier (ICU nursing) on 4/24/2022 at 1640 hours Signed by Dr Zuri Michael    XR CHEST PORTABLE    Result Date: 4/24/2022  XR CHEST PORTABLE 4/24/2022 1:17 PM HISTORY: ET tube placement  Technique: Single AP view of the chest COMPARISONS: 4/24/2022 FINDINGS: Status post endotracheal intubation. ET tube is in good position. Lungs are well-expanded. Enteric tube curls on itself in the midesophagus and then extends back up into the throat. Heart size is stable. Pulmonary vasculature are nondilated. 1. ET tube is in good position. Lungs are clear and well expanded. 2. Enteric tube curls on itself in the mid esophagus before extending back up into the throat. This will need repositioned. The results of this exam were discussed with Dr. Chetna Contreras on 4/24/2022 at 1329 hours Signed by Dr Zuri Michael    XR CHEST PORTABLE    Result Date: 4/24/2022  XR CHEST PORTABLE 4/24/2022 12:34 PM HISTORY: Altered mental status  Technique: Single AP view of the chest COMPARISONS: Chest exam dated 4/1/2022 FINDINGS: No lung consolidation. No pleural effusion or pneumothorax. Cardiomediastinal silhouette and pulmonary vasculature are unremarkable. No acute bony abnormality. Left subclavian region vascular stent with additional stent projecting over the left humerus. No acute cardiopulmonary process. Signed by Dr Dwayne Oropeza   -ESRD  -DM2 with nephropathy on long-term insulin, uncontrolled  -HTN  -Hyponatremia  -Hypokalemia  -Secondary Hyperparathyroidism  -Anemia CKD  -Seizure disorder  -Hypothyroidism   -Acuter resp failure       Plan:  Dialysis as above. Follow up labs. Awaiting final disposition for long term vent weaning and dialysis.        Sammie East MD, MD  05/13/22  11:30 AM

## 2022-05-13 NOTE — PROGRESS NOTES
Palliative Care Progress Note  5/13/2022 8:50 AM    Patient:  Frances Vera  YOB: 1970  Primary Care Physician: ROMINA Barraza  Advance Directive: Full Code  Admit Date: 4/24/2022       Hospital Day: 23  Portions of this note have been copied forward, however, changed to reflect the most current clinical status of this patient. CHIEF COMPLAINT/REASON FOR CONSULTATION Goals of care    SUBJECTIVE:  Ms. IrelandSaulo Peak remains with sedation/trach. Clinically unchanged    Review of Systems:   14 point review of systems is negative except as specifically addressed above. Objective:   VITALS:  BP (!) 151/59   Pulse 99   Temp 98.6 °F (37 °C) (Temporal)   Resp 14   Ht 5' 6\" (1.676 m)   Wt 131 lb 6.4 oz (59.6 kg)   SpO2 96%   BMI 21.21 kg/m²   24HR INTAKE/OUTPUT:      Intake/Output Summary (Last 24 hours) at 5/13/2022 0850  Last data filed at 5/13/2022 0600  Gross per 24 hour   Intake 1503.15 ml   Output 300 ml   Net 1203.15 ml     General appearance: 48 yo female, chronically ill appearing, trached/sedated, NAD  Head: Normocephalic, without obvious abnormality, atraumatic  Eyes: conjunctivae/corneas clear. Pupils sluggish  Ears: normal external ears and nose  Neck: supple, symmetrical, trachea midline, fresh trach present  Lungs: diminished bilaterally to asucultation, mechanically ventilated, equal chest rise  Heart: Regular rate and rhythm, S1, S2 normal, no murmur  Abdomen: soft, no grimacing w/ palpation; non-distended, bowel sounds present.  FMS in place with small diarrhea noted   Extremities: trace edema in BUE, No lower extremity edema,  No erythema, no grimacing w/ palpation   Skin: warm, dry, pale  Neurologic: trach/sedated, does not respond to voice, withdrawals to pain, less on right right    Medications:      sodium chloride      sodium chloride      propofol 30 mcg/kg/min (05/13/22 0600)    dextrose      sodium chloride Stopped (05/03/22 2005)      gentamicin  3 mg/kg IntraVENous Once    vancomycin  750 mg IntraVENous Once    collagenase   Topical Daily    sodium chloride flush  5-40 mL IntraVENous 2 times per day    sodium chloride flush  5-40 mL IntraVENous 2 times per day    vancomycin (VANCOCIN) intermittent dosing (placeholder)   Other RX Placeholder    aminoglycoside intermittent dosing (placeholder)   Other RX Placeholder    acetylcysteine  600 mg Inhalation BID    ipratropium-albuterol  1 ampule Inhalation Q4H    insulin glargine  20 Units SubCUTAneous BID    aspirin  81 mg Oral Daily    heparin (porcine)  5,000 Units SubCUTAneous TID    menthol-zinc oxide   Topical BID    insulin lispro  0-12 Units SubCUTAneous TID WC    insulin lispro  0-6 Units SubCUTAneous Nightly    hydrALAZINE  100 mg Oral 3 times per day    sodium chloride flush  5-40 mL IntraVENous 2 times per day    levetiracetam  500 mg IntraVENous Q12H    chlorhexidine  15 mL Mouth/Throat BID    famotidine (PEPCID) injection  20 mg IntraVENous Daily     sodium chloride flush, sodium chloride, sodium chloride flush, sodium chloride, HYDROmorphone, HYDROmorphone, hydrALAZINE, racepinephrine HCl, sodium chloride nebulizer, Peppermint Spirit, sodium phosphate IVPB **OR** sodium phosphate IVPB, glucagon (rDNA), dextrose, glucose, dextrose bolus **OR** dextrose bolus, sodium chloride flush, sodium chloride, LORazepam, ondansetron **OR** ondansetron, polyethylene glycol, acetaminophen **OR** acetaminophen, magic butt cream, magnesium sulfate, potassium alternative oral replacement **OR** potassium chloride  Diet NPO Exceptions are: Sips of Water with Meds  ADULT TUBE FEEDING; PEG; Peptide Based High Protein; Continuous; 20; Yes; 10; Q 6 hours; 48; 0; Other (specify); no free water flush     Lab and other Data:     Recent Labs     05/11/22 0432 05/12/22 0144 05/13/22 0212   WBC 10.4 7.7 10.2   HGB 9.3* 8.9* 8.3*    322 321     Recent Labs     05/11/22 0432 05/12/22 0144 05/13/22 0212   * 135* 132*   K 5.2* 4.0 4.7   CL 90* 93* 89*   CO2 21* 24 23   BUN 57* 34* 58*   CREATININE 3.4* 2.4* 3.3*   GLUCOSE 96 96 96     Recent Labs     05/11/22  0432 05/12/22  0144 05/13/22  0212   AST 15 18 13   ALT 7 9 8   BILITOT 0.3 0.3 0.3   ALKPHOS 101 100 98       Assessment/Plan   Principal Problem:    Acute ischemic stroke (HCC)  Active Problems:    Weakness    Severe malnutrition (HCC)    Gastroesophageal reflux disease without esophagitis    Acquired hypothyroidism    Anemia in chronic kidney disease (CKD)    Type 2 diabetes mellitus with chronic kidney disease on chronic dialysis, with long-term current use of insulin (HCC)    Acute encephalopathy    Respiratory failure ([address]    Uncontrolled type 2 diabetes mellitus with complication, with long-term current use of insulin (HCC)    ESRD (end stage renal disease) on dialysis ([address]    PVD (peripheral vascular disease) ([facility]    ESRD on hemodialysis ([facility] [address] [facility]    Hyponatremia    Hyperglycemia due to type 2 diabetes mellitus ([address]    Uncontrolled hypertension    Seizure ([address]    Palliative care patient    Uncontrolled type 2 diabetes mellitus with hyperosmolar nonketotic hyperglycemia (HCC)    Altered mental state    Acute hypoxemic respiratory failure (HCC)    Generalized weakness    UTI (urinary tract infection)    Closed fracture of left distal femur (HCC)    History of infection with vancomycin resistant Enterococcus (VRE)  Resolved Problems:    * No resolved hospital problems. *      Visit Summary:  Chart reviewed, patient discussed with nursing staff. Reviewed health issues, work up and treatment plan as well as factors that lead to hospitalization. Ms. Piedad Patterson is seen at bedside this morning with RN. No acute events overnight. She is POD#3 trach/peg. Insurance requiring first trach change prior to transfer to [facility]. Sister has been updated of plan of care per nursing staff this week. Significant other/decision maker, Tylor Houser, remains agreeable to [facility] placement.   Will continue to follow. Recommendations:   1. Palliative care: GOC continue all aggressive measures. Sig other agreeable to [facility] transfer once approved. Code status: FULL CODE    2. Acute hypoxemic respiratory failure- Extubated 5/3/22 but required reintubation that afternoon. Original intubation date 4/24/22. Now with trach/PEG 5/10/22    3. Acute left MCA infarct- mgmt per neuro. MRI 4/29/22 noted. Echo negative 4/30/22. On ASA. Supportive care    4. Hyperglycemia w/ Hx of DM II-mgmt per Hospitalist, SSI    5. S/p tonic-clonic seizure-mgmt per Neurology, cEEG discontinued, Keppra 500 mg BID    6. ESRD on HD-Nephrology managing. Dialysis today    7. Hypertension-Hydralazine 100 mg TID, cardene gtt as needed    8. Clostridium difficile/Norovirus- Oral vancomycin completed 5/8/22    9. MRSA/Raoultella ornithinolytica/Proteus vulgaris respiratory positivity- mgmt per hospitalist.    Thank you for consulting Palliative Care and allowing us to participate in the care of this patient.    Time Spent Counseling > 50%:  YES                                   Total Time Spent with patient/family counseling, workup/treatment review, counseling and placement of orders/preparation of this note: 19 minutes    Electronically signed by ROMINA Orona CNP on 5/13/2022 at 8:50 AM    (Please note that portions of this note were completed with a voice recognition program.  Evan Resendiz made to edit the dictations but occasionally words are mis-transcribed.)

## 2022-05-14 LAB
ALBUMIN SERPL-MCNC: 3 G/DL (ref 3.5–5.2)
ALP BLD-CCNC: 104 U/L (ref 35–104)
ALT SERPL-CCNC: 8 U/L (ref 5–33)
ANION GAP SERPL CALCULATED.3IONS-SCNC: 16 MMOL/L (ref 7–19)
AST SERPL-CCNC: 15 U/L (ref 5–32)
BASOPHILS ABSOLUTE: 0.1 K/UL (ref 0–0.2)
BASOPHILS RELATIVE PERCENT: 0.5 % (ref 0–1)
BILIRUB SERPL-MCNC: 0.3 MG/DL (ref 0.2–1.2)
BUN BLDV-MCNC: 43 MG/DL (ref 6–20)
CALCIUM SERPL-MCNC: 9 MG/DL (ref 8.6–10)
CHLORIDE BLD-SCNC: 94 MMOL/L (ref 98–111)
CO2: 27 MMOL/L (ref 22–29)
CREAT SERPL-MCNC: 2.4 MG/DL (ref 0.5–0.9)
EOSINOPHILS ABSOLUTE: 0.2 K/UL (ref 0–0.6)
EOSINOPHILS RELATIVE PERCENT: 1.9 % (ref 0–5)
GFR AFRICAN AMERICAN: 26
GFR NON-AFRICAN AMERICAN: 21
GLUCOSE BLD-MCNC: 160 MG/DL (ref 70–99)
GLUCOSE BLD-MCNC: 68 MG/DL (ref 70–99)
GLUCOSE BLD-MCNC: 73 MG/DL (ref 70–99)
GLUCOSE BLD-MCNC: 90 MG/DL (ref 74–109)
HCT VFR BLD CALC: 25.5 % (ref 37–47)
HEMOGLOBIN: 7.6 G/DL (ref 12–16)
IMMATURE GRANULOCYTES #: 0 K/UL
INR BLD: 1.14 (ref 0.88–1.18)
LYMPHOCYTES ABSOLUTE: 0.8 K/UL (ref 1.1–4.5)
LYMPHOCYTES RELATIVE PERCENT: 9.1 % (ref 20–40)
MAGNESIUM: 2.3 MG/DL (ref 1.6–2.6)
MCH RBC QN AUTO: 28.3 PG (ref 27–31)
MCHC RBC AUTO-ENTMCNC: 29.8 G/DL (ref 33–37)
MCV RBC AUTO: 94.8 FL (ref 81–99)
MONOCYTES ABSOLUTE: 0.8 K/UL (ref 0–0.9)
MONOCYTES RELATIVE PERCENT: 9 % (ref 0–10)
NEUTROPHILS ABSOLUTE: 7.2 K/UL (ref 1.5–7.5)
NEUTROPHILS RELATIVE PERCENT: 79.2 % (ref 50–65)
PDW BLD-RTO: 18.8 % (ref 11.5–14.5)
PERFORMED ON: ABNORMAL
PERFORMED ON: ABNORMAL
PERFORMED ON: NORMAL
PHOSPHORUS: 5.7 MG/DL (ref 2.5–4.5)
PLATELET # BLD: 274 K/UL (ref 130–400)
PMV BLD AUTO: 9.1 FL (ref 9.4–12.3)
POTASSIUM SERPL-SCNC: 4.6 MMOL/L (ref 3.5–5)
PROTHROMBIN TIME: 14.6 SEC (ref 12–14.6)
RBC # BLD: 2.69 M/UL (ref 4.2–5.4)
SODIUM BLD-SCNC: 137 MMOL/L (ref 136–145)
TOTAL PROTEIN: 6.1 G/DL (ref 6.6–8.7)
WBC # BLD: 9.1 K/UL (ref 4.8–10.8)

## 2022-05-14 PROCEDURE — 6370000000 HC RX 637 (ALT 250 FOR IP): Performed by: SURGERY

## 2022-05-14 PROCEDURE — 6360000002 HC RX W HCPCS: Performed by: SURGERY

## 2022-05-14 PROCEDURE — 94640 AIRWAY INHALATION TREATMENT: CPT

## 2022-05-14 PROCEDURE — 36415 COLL VENOUS BLD VENIPUNCTURE: CPT

## 2022-05-14 PROCEDURE — 2500000003 HC RX 250 WO HCPCS: Performed by: INTERNAL MEDICINE

## 2022-05-14 PROCEDURE — 6370000000 HC RX 637 (ALT 250 FOR IP): Performed by: PSYCHIATRY & NEUROLOGY

## 2022-05-14 PROCEDURE — 6370000000 HC RX 637 (ALT 250 FOR IP): Performed by: INTERNAL MEDICINE

## 2022-05-14 PROCEDURE — 85025 COMPLETE CBC W/AUTO DIFF WBC: CPT

## 2022-05-14 PROCEDURE — 2580000003 HC RX 258: Performed by: SURGERY

## 2022-05-14 PROCEDURE — 80053 COMPREHEN METABOLIC PANEL: CPT

## 2022-05-14 PROCEDURE — 84100 ASSAY OF PHOSPHORUS: CPT

## 2022-05-14 PROCEDURE — 94003 VENT MGMT INPAT SUBQ DAY: CPT

## 2022-05-14 PROCEDURE — 2500000003 HC RX 250 WO HCPCS: Performed by: SURGERY

## 2022-05-14 PROCEDURE — 2580000003 HC RX 258: Performed by: ANESTHESIOLOGY

## 2022-05-14 PROCEDURE — 85610 PROTHROMBIN TIME: CPT

## 2022-05-14 PROCEDURE — 2700000000 HC OXYGEN THERAPY PER DAY

## 2022-05-14 PROCEDURE — 82947 ASSAY GLUCOSE BLOOD QUANT: CPT

## 2022-05-14 PROCEDURE — 83735 ASSAY OF MAGNESIUM: CPT

## 2022-05-14 PROCEDURE — 2000000000 HC ICU R&B

## 2022-05-14 PROCEDURE — 31502 CHANGE OF WINDPIPE AIRWAY: CPT

## 2022-05-14 RX ORDER — GLYCOPYRROLATE 0.2 MG/ML
0.1 INJECTION INTRAMUSCULAR; INTRAVENOUS ONCE
Status: COMPLETED | OUTPATIENT
Start: 2022-05-14 | End: 2022-05-14

## 2022-05-14 RX ORDER — INSULIN LISPRO 100 [IU]/ML
0-3 INJECTION, SOLUTION INTRAVENOUS; SUBCUTANEOUS NIGHTLY
Status: DISCONTINUED | OUTPATIENT
Start: 2022-05-14 | End: 2022-05-25 | Stop reason: HOSPADM

## 2022-05-14 RX ORDER — INSULIN LISPRO 100 [IU]/ML
0-6 INJECTION, SOLUTION INTRAVENOUS; SUBCUTANEOUS
Status: DISCONTINUED | OUTPATIENT
Start: 2022-05-14 | End: 2022-05-25 | Stop reason: HOSPADM

## 2022-05-14 RX ADMIN — COLLAGENASE SANTYL: 250 OINTMENT TOPICAL at 08:28

## 2022-05-14 RX ADMIN — Medication 10 ML: at 08:27

## 2022-05-14 RX ADMIN — IPRATROPIUM BROMIDE AND ALBUTEROL SULFATE 1 AMPULE: 2.5; .5 SOLUTION RESPIRATORY (INHALATION) at 06:12

## 2022-05-14 RX ADMIN — HYDRALAZINE HYDROCHLORIDE 100 MG: 50 TABLET, FILM COATED ORAL at 20:47

## 2022-05-14 RX ADMIN — PROPOFOL 10 MCG/KG/MIN: 10 INJECTION, EMULSION INTRAVENOUS at 23:23

## 2022-05-14 RX ADMIN — IPRATROPIUM BROMIDE AND ALBUTEROL SULFATE 1 AMPULE: 2.5; .5 SOLUTION RESPIRATORY (INHALATION) at 02:06

## 2022-05-14 RX ADMIN — METOPROLOL SUCCINATE 100 MG: 50 TABLET, EXTENDED RELEASE ORAL at 06:35

## 2022-05-14 RX ADMIN — INSULIN LISPRO 1 UNITS: 100 INJECTION, SOLUTION INTRAVENOUS; SUBCUTANEOUS at 20:47

## 2022-05-14 RX ADMIN — HYDRALAZINE HYDROCHLORIDE 100 MG: 50 TABLET, FILM COATED ORAL at 15:21

## 2022-05-14 RX ADMIN — IPRATROPIUM BROMIDE AND ALBUTEROL SULFATE 1 AMPULE: 2.5; .5 SOLUTION RESPIRATORY (INHALATION) at 10:20

## 2022-05-14 RX ADMIN — ANORECTAL OINTMENT: 15.7; .44; 24; 20.6 OINTMENT TOPICAL at 17:02

## 2022-05-14 RX ADMIN — CHLORHEXIDINE GLUCONATE 15 ML: 1.2 RINSE ORAL at 20:48

## 2022-05-14 RX ADMIN — ACETYLCYSTEINE 600 MG: 200 SOLUTION ORAL; RESPIRATORY (INHALATION) at 18:21

## 2022-05-14 RX ADMIN — CHLORHEXIDINE GLUCONATE 15 ML: 1.2 RINSE ORAL at 08:28

## 2022-05-14 RX ADMIN — LEVOTHYROXINE SODIUM 150 MCG: 75 TABLET ORAL at 06:35

## 2022-05-14 RX ADMIN — ACETYLCYSTEINE 600 MG: 200 SOLUTION ORAL; RESPIRATORY (INHALATION) at 06:12

## 2022-05-14 RX ADMIN — LEVETIRACETAM 500 MG: 500 SOLUTION ORAL at 08:20

## 2022-05-14 RX ADMIN — SODIUM CHLORIDE, PRESERVATIVE FREE 20 MG: 5 INJECTION INTRAVENOUS at 08:21

## 2022-05-14 RX ADMIN — IPRATROPIUM BROMIDE AND ALBUTEROL SULFATE 1 AMPULE: 2.5; .5 SOLUTION RESPIRATORY (INHALATION) at 14:19

## 2022-05-14 RX ADMIN — HEPARIN SODIUM 5000 UNITS: 5000 INJECTION INTRAVENOUS; SUBCUTANEOUS at 08:21

## 2022-05-14 RX ADMIN — SEVELAMER CARBONATE 800 MG: 800 TABLET, FILM COATED ORAL at 08:20

## 2022-05-14 RX ADMIN — HEPARIN SODIUM 5000 UNITS: 5000 INJECTION INTRAVENOUS; SUBCUTANEOUS at 15:22

## 2022-05-14 RX ADMIN — LEVETIRACETAM 500 MG: 500 SOLUTION ORAL at 20:48

## 2022-05-14 RX ADMIN — ACETYLCYSTEINE 600 MG: 200 SOLUTION ORAL; RESPIRATORY (INHALATION) at 18:20

## 2022-05-14 RX ADMIN — GLYCOPYRROLATE 0.1 MG: 0.2 INJECTION INTRAMUSCULAR; INTRAVENOUS at 15:11

## 2022-05-14 RX ADMIN — HYDRALAZINE HYDROCHLORIDE 10 MG: 20 INJECTION INTRAMUSCULAR; INTRAVENOUS at 04:21

## 2022-05-14 RX ADMIN — IPRATROPIUM BROMIDE AND ALBUTEROL SULFATE 1 AMPULE: 2.5; .5 SOLUTION RESPIRATORY (INHALATION) at 22:14

## 2022-05-14 RX ADMIN — IPRATROPIUM BROMIDE AND ALBUTEROL SULFATE 1 AMPULE: 2.5; .5 SOLUTION RESPIRATORY (INHALATION) at 18:20

## 2022-05-14 RX ADMIN — HYDRALAZINE HYDROCHLORIDE 100 MG: 50 TABLET, FILM COATED ORAL at 06:35

## 2022-05-14 RX ADMIN — DULOXETINE 60 MG: 60 CAPSULE, DELAYED RELEASE ORAL at 08:20

## 2022-05-14 RX ADMIN — NIFEDIPINE 90 MG: 90 TABLET, FILM COATED, EXTENDED RELEASE ORAL at 08:20

## 2022-05-14 RX ADMIN — ANORECTAL OINTMENT: 15.7; .44; 24; 20.6 OINTMENT TOPICAL at 06:36

## 2022-05-14 RX ADMIN — ROPINIROLE HYDROCHLORIDE 4 MG: 4 TABLET, FILM COATED ORAL at 20:47

## 2022-05-14 RX ADMIN — PROPOFOL 25 MCG/KG/MIN: 10 INJECTION, EMULSION INTRAVENOUS at 02:25

## 2022-05-14 RX ADMIN — Medication 10 ML: at 20:49

## 2022-05-14 RX ADMIN — ASPIRIN 81 MG 81 MG: 81 TABLET ORAL at 08:20

## 2022-05-14 RX ADMIN — ATORVASTATIN CALCIUM 20 MG: 20 TABLET, FILM COATED ORAL at 08:20

## 2022-05-14 RX ADMIN — ISOSORBIDE MONONITRATE 120 MG: 60 TABLET, EXTENDED RELEASE ORAL at 08:20

## 2022-05-14 RX ADMIN — HEPARIN SODIUM 5000 UNITS: 5000 INJECTION INTRAVENOUS; SUBCUTANEOUS at 20:47

## 2022-05-14 ASSESSMENT — PULMONARY FUNCTION TESTS
PIF_VALUE: 17
PIF_VALUE: 18
PIF_VALUE: 17
PIF_VALUE: 17
PIF_VALUE: 18
PIF_VALUE: 16
PIF_VALUE: 18
PIF_VALUE: 17
PIF_VALUE: 26
PIF_VALUE: 18
PIF_VALUE: 17
PIF_VALUE: 17
PIF_VALUE: 18
PIF_VALUE: 18
PIF_VALUE: 19
PIF_VALUE: 17
PIF_VALUE: 20
PIF_VALUE: 18
PIF_VALUE: 17
PIF_VALUE: 24

## 2022-05-14 ASSESSMENT — PAIN SCALES - GENERAL
PAINLEVEL_OUTOF10: 0

## 2022-05-14 NOTE — PROGRESS NOTES
Hospitalist Progress Note  [facility]     Patient: Sg Peak  : 1970  MRN: [id number]  Code Status: Full Code    Hospital Day:    Date of Service: 2022    Subjective:   Patient seen and examined. Trached and sedated.     Past Medical History:   Diagnosis Date    Acute ischemic stroke ([facility] 2022    Arthritis     Chronic kidney disease, stage 5, kidney failure (HCC)     Closed fracture of right shoulder girdle, with routine healing, subsequent encounter     DM (diabetes mellitus) type II controlled with renal manifestation ([facility] 1993    Gastroparesis     GERD (gastroesophageal reflux disease)     Hemodialysis patient ([facility]     dialysis on tues, thur, and sat at [facility]    Hypertension     Hypothyroid     Nasal congestion     recent    Neuropathy     Noncompliance with medications     Palliative care patient 10/08/2019    Restless leg syndrome        Past Surgical History:   Procedure Laterality Date    BREAST SURGERY Left     infected milk gland removed    BREAST SURGERY Right 2021    WEDGE EXCISION RIGHT BREAST DUCT WITH LACRIMAL DUCT PROBE, PEC BLOCK performed by Megan Schrader MD at [address] [facility]          COLONOSCOPY Left 2020    Dr Christina Tristan hepatic flexure-Severe tortuosity with severe spasming, 1 yr recall    DIALYSIS FISTULA CREATION Left 2019    REVISION LEFT UPPER EXTREMITY AV ACCESS WITH LEFT BRACHIAL ARTERY TO LEFT AXILLARY VEIN WITH  INTERPOSITIONAL ARTEGRAFT   performed by Kelsy Land MD at [address]      GASTROSTOMY TUBE PLACEMENT N/A 5/10/2022    PERCUTANEOUS ENDOSCOPIC GASTROSTOMY TUBE PLACEMENT, performed by Job Cárdenas MD at [address] [address] Right 2019    INSERTION OF RIGHT INTERNAL JUGULAR HEMODIALYSIS CATHETER performed by Kelsy Land MD at [address] [facility]  2018    1.  Moderately increased stainable iron identified by special stain in Kupffer cells and occasional hepatocytes. Negative for evidence of significant portal or lobular inflammation. Negative for evidence of significant fibrosis    FL COLONOSCOPY W/BIOPSY SINGLE/MULTIPLE N/A 10/20/2017    Dr Shahla Silva prep-Tubular AP (-) dysplasia x 2--3 yr recall    FL EGD TRANSORAL BIOPSY SINGLE/MULTIPLE N/A 2016    Dr Ellen Benson EGD TRANSORAL BIOPSY SINGLE/MULTIPLE N/A 10/20/2017    Dr Sara Dooley (-)    TRACHEOSTOMY  5/10/2022    TRACHEOSTOMY PERCUTANEOUS performed by Emily Wallace MD at [address] VASCULAR SURGERY Left 2016    fistula by Dr Overton Shows at [address]  10/02/2017    [facility]. Left upper fistulograms/venograms, balloon angioplasty cephalic vein arch 88H57 conquest.    VASCULAR SURGERY  2018    FISTULOGRAM    VASCULAR SURGERY  10/29/2018    [facility]. Left upper fistulograms/venograms    VASCULAR SURGERY  2018    [facility]. Arch aortogram,left upper arteriograms.  VASCULAR SURGERY  2019    [facility]. Removal of tunneled dialyis catheter right internal jugular vein.  VASCULAR SURGERY  2019    [facility]. Left upper extremity fistulogram including venography to the superior vena cava. Balloon angioplasty left subclavian vein with 10mm x 40mm conquest balloon. Balloon angioplasty mid/proximal upper arm cephalic vein with 44LN x 40mm conquest balloon. Venograms after balloon angioplasty. Stent left mid/proximal upper arm cephalic vein stenosis fluency 10mm x 60mm self-expanding covered stent. Balloon     VASCULAR SURGERY  2019    cont angioplasty stent with 10mm x 40mm conquest balloon. Completion venograms left upper extremity.        Family History   Problem Relation Age of Onset    Colon Cancer Mother          at 63    Colon Polyps Mother     Lung Cancer Father          at 66    Colon Polyps Father     Stomach Cancer Sister     Breast Cancer Sister 27    Depression Daughter 25 file    Sexually Abused: Not on file   Housing Stability:     Unable to Pay for Housing in the Last Year: Not on file    Number of Places Lived in the Last Year: Not on file    Unstable Housing in the Last Year: Not on file       Current Facility-Administered Medications   Medication Dose Route Frequency Provider Last Rate Last Admin    levETIRAcetam (KEPPRA) 100 MG/ML solution 500 mg  500 mg Per G Tube BID Pastor Gin MD   500 mg at 05/14/22 0820    atorvastatin (LIPITOR) tablet 20 mg  20 mg Oral Daily Andre Schrader MD   20 mg at 05/14/22 0820    sevelamer (RENVELA) tablet 800 mg  800 mg Oral TID WC Andre Schrader MD   800 mg at 05/14/22 0820    rOPINIRole (REQUIP) tablet 4 mg  4 mg Oral Nightly Andre Schrader MD   4 mg at 05/13/22 2050    NIFEdipine (PROCARDIA XL) extended release tablet 90 mg  90 mg Oral Daily Andre Schrader MD   90 mg at 05/14/22 0820    metoprolol succinate (TOPROL XL) extended release tablet 100 mg  100 mg Oral QAM AC Andre Schrader MD   100 mg at 05/14/22 0374    levothyroxine (SYNTHROID) tablet 150 mcg  150 mcg Oral Daily Andre Schrader MD   150 mcg at 05/14/22 6193    isosorbide mononitrate (IMDUR) extended release tablet 120 mg  120 mg Oral Daily Andre Schrader MD   120 mg at 05/14/22 0820    DULoxetine (CYMBALTA) extended release capsule 60 mg  60 mg Oral Daily Andre Schrader MD   60 mg at 05/14/22 0820    collagenase ointment   Topical Daily Andre Schrader MD   Given at 05/14/22 0801    sodium chloride flush 0.9 % injection 5-40 mL  5-40 mL IntraVENous 2 times per day Victorina Woods MD   10 mL at 05/14/22 0827    sodium chloride flush 0.9 % injection 5-40 mL  5-40 mL IntraVENous PRN Victorina Woods MD        0.9 % sodium chloride infusion   IntraVENous PRN Victorina Woods MD        HYDROmorphone HCl PF (DILAUDID) injection 0.25 mg  0.25 mg IntraVENous Q5 Min PRN Victorina Woods MD        HYDROmorphone HCl PF (DILAUDID) injection 0.5 mg  0.5 mg IntraVENous Q5 Min PRN Krista Cuenca MD        vancomycin [facility]) intermittent dosing (placeholder)   Other RX Placeholder Jean Betancourt MD        aminoglycoside intermittent dosing (placeholder)   Other RX 1 Yumi Wise MD        acetylcysteine (MUCOMYST) 20 % solution 600 mg  600 mg Inhalation BID Jean Betancourt MD   600 mg at 05/14/22 0612    ipratropium-albuterol (DUONEB) nebulizer solution 1 ampule  1 ampule Inhalation Q4H Jean Betancourt MD   1 ampule at 05/14/22 1020    insulin glargine (LANTUS) injection vial 20 Units  20 Units SubCUTAneous BID Jean Betancourt MD   20 Units at 05/13/22 2050    hydrALAZINE (APRESOLINE) injection 10 mg  10 mg IntraVENous Q6H PRN Jean Betancourt MD   10 mg at 05/14/22 0421    aspirin chewable tablet 81 mg  81 mg Oral Daily Jean Betancourt MD   81 mg at 05/14/22 0820    racepinephrine HCl (VAPONEFPRIN) 2.25 % nebulizer solution NEBU 11.25 mg  11.25 mg Nebulization Q4H PRN Jean Betancourt MD   11.25 mg at 05/03/22 1408    sodium chloride nebulizer 0.9 % solution 3 mL  3 mL Nebulization Q4H PRN Jean Betancourt MD        propofol injection  5-50 mcg/kg/min IntraVENous Continuous Jean Betancourt MD 9 mL/hr at 05/14/22 0600 25 mcg/kg/min at 05/14/22 0600    heparin (porcine) injection 5,000 Units  5,000 Units SubCUTAneous TID Jean Betancourt MD   5,000 Units at 05/14/22 9061    Peppermint Spirit SPRT   Does not apply PRN Jean Betancourt MD        menthol-zinc oxide (CALMOSEPTINE) 0.44-20.6 % ointment   Topical BID Greyson MD Madeleine   Given at 05/14/22 0636    sodium phosphate 10.5 mmol in sodium chloride 0.9 % 250 mL IVPB  0.16 mmol/kg IntraVENous PRN Jean Betancourt MD        Or    sodium phosphate 21 mmol in sodium chloride 0.9 % 250 mL IVPB  0.32 mmol/kg IntraVENous PRN Jean Betancourt MD        insulin lispro (HUMALOG) injection vial 0-12 Units  0-12 Units SubCUTAneous TID WC Jean Betancourt MD   2 Units at 05/13/22 1623    insulin lispro (HUMALOG) injection vial 0-6 Units  0-6 Units SubCUTAneous Nightly Job Cárdenas MD   1 Units at 05/12/22 2115    hydrALAZINE (APRESOLINE) tablet 100 mg  100 mg Oral 3 times per day Job Cárdenas MD   100 mg at 05/14/22 4636    glucagon (rDNA) injection 1 mg  1 mg IntraMUSCular PRN Job Cárdenas MD        dextrose 5 % solution  100 mL/hr IntraVENous PRN Job Cárdenas MD        glucose chewable tablet 16 g  4 tablet Oral PRN Job Cárdenas MD        dextrose bolus (hypoglycemia) 10% 125 mL  125 mL IntraVENous PRN Job Cárdenas MD   Stopped at 05/11/22 2016    Or    dextrose bolus (hypoglycemia) 10% 250 mL  250 mL IntraVENous PRN Job Cárdenas MD   Stopped at 05/10/22 2026    LORazepam (ATIVAN) injection 1 mg  1 mg IntraVENous Q5 Min PRN Job Cárdenas MD        ondansetron (ZOFRAN-ODT) disintegrating tablet 4 mg  4 mg Oral Q8H PRN Job Cárdenas MD        Or    ond[facility]etron [facility]) injection 4 mg  4 mg IntraVENous Q6H PRN Job Cárdenas MD        polyethylene glycol [facility]) packet 17 g  17 g Oral Daily PRN Job Cárdenas MD        acetaminophen (TYLENOL) tablet 650 mg  650 mg Oral Q6H PRN Job Cárdenas MD   650 mg at 04/25/22 0035    Or    acetaminophen (TYLENOL) suppository 650 mg  650 mg Rectal Q6H PRN Job Cárdenas MD        chlorhexidine (PERIDEX) 0.12 % solution 15 mL  15 mL Mouth/Throat BID Job Cárdenas MD   15 mL at 05/14/22 0828    famotidine (PEPCID) 20 mg in sodium chloride (PF) 10 mL injection  20 mg IntraVENous Daily Job Cárdenas MD   20 mg at 05/14/22 2110    magic butt cream   Topical Q4H PRN Job Cárdenas MD   Given at 05/03/22 0940    magnesium sulfate 1000 mg in dextrose 5% 100 mL IVPB  1,000 mg IntraVENous PRN Job Cárdenas MD        potassium bicarb-citric acid (EFFER-K) effervescent tablet 40 mEq  40 mEq Oral PRN Job Cárdenas MD        Or    potassium chloride 10 mEq/100 mL IVPB (Peripheral Line)  10 mEq IntraVENous PRN Job Cárdenas MD   Stopped at 04/25/22 0936         sodium chloride      propofol 25 mcg/kg/min (05/14/22 0600)    dextrose          Objective:   BP (!) 104/43   Pulse 102   Temp 98.8 °F (37.1 °C) (Temporal)   Resp 14   Ht 5' 6\" (1.676 m)   Wt 131 lb (59.4 kg)   SpO2 96%   BMI 21.14 kg/m²     General: no acute distress  HEENT: normocephalic, atraumatic  Lungs: faint rhonchi  Cardiovascular: s1 and s2 normal  Abdomen: soft, positive bowel sounds  Extremities: no cyanosis   Neuro: trached and sedated     Recent Labs     05/12/22 0144 05/13/22 0212 05/14/22 0137   WBC 7.7 10.2 9.1   RBC 3.09* 2.88* 2.69*   HGB 8.9* 8.3* 7.6*   HCT 29.6* 27.4* 25.5*   MCV 95.8 95.1 94.8   MCH 28.8 28.8 28.3   MCHC 30.1* 30.3* 29.8*    321 274     Recent Labs     05/12/22 0144 05/13/22 0212 05/14/22 0137   * 132* 137   K 4.0 4.7 4.6   ANIONGAP 18 20* 16   CL 93* 89* 94*   CO2 24 23 27   BUN 34* 58* 43*   CREATININE 2.4* 3.3* 2.4*   GLUCOSE 96 96 90   CALCIUM 9.0 9.3 9.0     Recent Labs     05/12/22 0144 05/13/22 0212 05/14/22 0137   MG 2.4 2.6 2.3   PHOS 5.0* 7.2* 5.7*     Recent Labs     05/12/22 0144 05/13/22 0212 05/14/22 0137   AST 18 13 15   ALT 9 8 8   BILITOT 0.3 0.3 0.3   ALKPHOS 100 98 104     No results for input(s): PH, PO2, PCO2, HCO3, BE, O2SAT in the last 72 hours. No results for input(s): TROPONINI in the last 72 hours. Recent Labs     05/12/22 0144 05/13/22 0212 05/14/22 0137   INR 1.12 1.16 1.14     No results for input(s): LACTA in the last 72 hours. Intake/Output Summary (Last 24 hours) at 5/14/2022 1032  Last data filed at 5/14/2022 0800  Gross per 24 hour   Intake 1719.76 ml   Output 0 ml   Net 1719.76 ml       No results found.      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 Monty Reza     ESRD on HD  Renal following     Medical noncompliance       C. difficile infection  Completed oral vancomycin course     Tobacco dependence       IDDM 2  Caution hypoglycemia     DVT prophylaxis  Heparin    Total critical care time: 40 minutes    Kay Singh MD   5/14/2022 10:32 AM

## 2022-05-14 NOTE — PROGRESS NOTES
Nephrology ([facility])  Progress Note    Patient:  Helena Luu  YOB: 1970  Date of Service: 5/14/2022  MRN: [id number]   Acct: [de-identified]   Primary Care Physician: ROMINA Adamson  Advance Directive: Full Code  Admit Date: 4/24/2022       Hospital Day: 20  Referring Provider: Lary Lennox, MD    Patient Seen, Chart, Consults, Notes, Labs, Radiology studies reviewed. Subjective:  Helena Luu is a 47 y.o. female for whom we were consulted for evaluation and treatment of end-stage renal disease.  Patient also has a history of seizure disorder, type 2 diabetes, hypertension, frequent hospitalization with noncompliance and poorly controlled diabetes.  Patient was brought to the emergency room after she was found to have unresponsiveness and witnessed grand mal seizure. Cristina Pope was brought in by ambulance.  On arrival in the emergency room, she was found to be severely hyperglycemic and had a urinary tract infection.  Patient was intubated, sedated and treated by neurology for grand mal seizure. Cristina Pope was also receiving IV antibiotics and insulin drip. Ivy Davis blood sugar control had significantly improved.  On April 25, she has received emergent hemodialysis treatment for correction of volume status as well as hyponatremia. She had an MRI head 4/29 that showed an acute left MCA distribution infarct. Patient remains on the ventilator and unable to participate in the history and physical examination. She is s/p trach and PEG placement on 5/10. She is s/p dialysis on 5/13. Patient is also waiting to be transferred to [facility]. Today, she was lethargic.       Allergies:  Penicillins, Lamisil advanced [terbinafine], Reglan [metoclopramide], and Stadol [butorphanol]    Medicines:  Current Facility-Administered Medications   Medication Dose Route Frequency Provider Last Rate Last Admin    levETIRAcetam (KEPPRA) 100 MG/ML solution 500 mg  500 mg Per G Tube BID Kacey Worley MD Vel Simpson MD   1 ampule at 05/14/22 0612    insulin glargine (LANTUS) injection vial 20 Units  20 Units SubCUTAneous BID Blanche Jameson MD   20 Units at 05/13/22 2050    hydrALAZINE (APRESOLINE) injection 10 mg  10 mg IntraVENous Q6H PRN Blanche Jameson MD   10 mg at 05/14/22 0421    aspirin chewable tablet 81 mg  81 mg Oral Daily Blanche Jameson MD   81 mg at 05/14/22 0820    racepinephrine HCl (VAPONEFPRIN) 2.25 % nebulizer solution NEBU 11.25 mg  11.25 mg Nebulization Q4H PRN Blanche Jameson MD   11.25 mg at 05/03/22 1408    sodium chloride nebulizer 0.9 % solution 3 mL  3 mL Nebulization Q4H PRN Blanche Jameson MD        propofol injection  5-50 mcg/kg/min IntraVENous Continuous Blanche Jameson MD 9 mL/hr at 05/14/22 0600 25 mcg/kg/min at 05/14/22 0600    heparin (porcine) injection 5,000 Units  5,000 Units SubCUTAneous TID Blanche Jameson MD   5,000 Units at 05/14/22 0596    Peppermint Spirit SPRT   Does not apply PRN Blanche Jameson MD        menthol-zinc oxide (CALMOSEPTINE) 0.44-20.6 % ointment   Topical BID Hollie Boeck, MD   Given at 05/14/22 0636    sodium phosphate 10.5 mmol in sodium chloride 0.9 % 250 mL IVPB  0.16 mmol/kg IntraVENous PRN Blanche Jameson MD        Or    sodium phosphate 21 mmol in sodium chloride 0.9 % 250 mL IVPB  0.32 mmol/kg IntraVENous PRN Blanche Jameson MD        insulin lispro (HUMALOG) injection vial 0-12 Units  0-12 Units SubCUTAneous TID WC Blanche Jameson MD   2 Units at 05/13/22 1623    insulin lispro (HUMALOG) injection vial 0-6 Units  0-6 Units SubCUTAneous Nightly Blanche Jameson MD   1 Units at 05/12/22 2115    hydrALAZINE (APRESOLINE) tablet 100 mg  100 mg Oral 3 times per day Blanche Jameson MD   100 mg at 05/14/22 0635    glucagon (rDNA) injection 1 mg  1 mg IntraMUSCular PRN Blanche Jameson MD        dextrose 5 % solution  100 mL/hr IntraVENous PRN Blanche Jameson MD        glucose chewable tablet 16 g  4 tablet Oral PRN Blanche Jameson, MD        dextrose bolus (hypoglycemia) 10% 125 mL  125 mL IntraVENous PRN Batool Rodriguez MD   Stopped at 05/11/22 2016    Or    dextrose bolus (hypoglycemia) 10% 250 mL  250 mL IntraVENous PRN Batool Rodriguze MD   Stopped at 05/10/22 2026    LORazepam (ATIVAN) injection 1 mg  1 mg IntraVENous Q5 Min PRN Batool Rodriguez MD        ondansetron (ZOFRAN-ODT) disintegrating tablet 4 mg  4 mg Oral Q8H PRN Batool Rodriguez MD        Or    [facility]ron [facility]) injection 4 mg  4 mg IntraVENous Q6H PRN Batool Rodriguez MD        polyethylene glycol [facility]) packet 17 g  17 g Oral Daily PRN Batool Rodriguez MD        acetaminophen (TYLENOL) tablet 650 mg  650 mg Oral Q6H PRN Batool Rodriguez MD   650 mg at 04/25/22 0035    Or    acetaminophen (TYLENOL) suppository 650 mg  650 mg Rectal Q6H PRN Batool Rodriguez MD        chlorhexidine (PERIDEX) 0.12 % solution 15 mL  15 mL Mouth/Throat BID Batool Rodriguez MD   15 mL at 05/14/22 0828    famotidine (PEPCID) 20 mg in sodium chloride (PF) 10 mL injection  20 mg IntraVENous Daily Batool Rodriguez MD   20 mg at 05/14/22 4017    magic butt cream   Topical Q4H PRN Batool Rodriguez MD   Given at 05/03/22 0940    magnesium sulfate 1000 mg in dextrose 5% 100 mL IVPB  1,000 mg IntraVENous PRN Batool Rodriguez MD        potassium bicarb-citric acid (EFFER-K) effervescent tablet 40 mEq  40 mEq Oral PRN Batool Rodriguez MD        Or    potassium chloride 10 mEq/100 mL IVPB (Peripheral Line)  10 mEq IntraVENous PRN Batool Rodriguez MD   Stopped at 04/25/22 5872       Past Medical History:  Past Medical History:   Diagnosis Date    Acute ischemic stroke ([facility] 4/30/2022    Arthritis     Chronic kidney disease, stage 5, kidney failure ([address]     Closed fracture of right shoulder girdle, with routine healing, subsequent encounter     DM (diabetes mellitus) type II [facility]    Gastroparesis     GERD (gastroesophageal reflux disease)     Hemod[facility] patient ([facility]     dialysis on tues, thur, and [facility] [address] [facility] Hypertension     Hypothyroid     Nasal congestion     recent    Neuropathy     Noncompliance with medications     Palliative care patient 10/08/2019    Restless leg syndrome        Past Surgical History:  Past Surgical History:   Procedure Laterality Date    BREAST SURGERY Left 2008    infected milk gland removed    BREAST SURGERY Right 5/25/2021    WEDGE EXCISION RIGHT BREAST DUCT WITH LACRIMAL DUCT PROBE, PEC BLOCK performed by Pedrito Ferris MD at [address], [facility]      2008    COLONOSCOPY Left 9/17/2020    Dr Derick Leija hepatic flexure-Severe tortuosity with severe spasming, 1 yr recall    DIALYSIS FISTULA CREATION Left 1/25/2019    REVISION LEFT UPPER EXTREMITY AV ACCESS WITH LEFT BRACHIAL ARTERY TO LEFT AXILLARY VEIN WITH  INTERPOSITIONAL ARTEGRAFT   performed by Judit Tinoco MD at [address]  2012    GASTROSTOMY TUBE PLACEMENT N/A 5/10/2022    PERCUTANEOUS ENDOSCOPIC GASTROSTOMY TUBE PLACEMENT, performed by Steven Medrano MD at [address] Right 1/25/2019    INSERTION OF RIGHT INTERNAL JUGULAR HEMODIALYSIS CATHETER performed by Judit Tinoco MD at [address] [facility]  08/17/2018    1.  Moderately increased stainable iron identified by special stain in Kupffer cells and occasional hepatocytes. Negative for evidence of significant portal or lobular inflammation.  Negative for evidence of significant fibrosis    UT COLONOSCOPY W/BIOPSY SINGLE/MULTIPLE N/A 10/20/2017    Dr Omar Ochoa prep-Tubular AP (-) dysplasia x 2--3 yr recall    UT EGD TRANSORAL BIOPSY SINGLE/MULTIPLE N/A 12/27/2016    Dr Abbey Ferris EGD TRANSORAL BIOPSY SINGLE/MULTIPLE N/A 10/20/2017    Dr Elen Larsen (-)    TRACHEOSTOMY  5/10/2022    TRACHEOSTOMY PERCUTANEOUS performed by Steven Medrano MD at [facility] 2016    fistula by Dr Carloz Matthew at [address]  10/02/2017    SJS. Left upper fistulograms/venograms, balloon angioplasty cephalic vein arch 19G93 conquest.    VASCULAR SURGERY  2018    FISTULOGRAM    VASCULAR SURGERY  10/29/2018    SJS. Left upper fistulograms/venograms    VASCULAR SURGERY  2018    SJS. Arch aortogram,left upper arteriograms.  VASCULAR SURGERY  2019    SJS. Removal of tunneled dialyis catheter right internal jugular vein.  VASCULAR SURGERY  2019    SJS. Left upper extremity fistulogram including venography to the superior vena cava. Balloon angioplasty left subclavian vein with 10mm x 40mm conquest balloon. Balloon angioplasty mid/proximal upper arm cephalic vein with 13BZ x 40mm conquest balloon. Venograms after balloon angioplasty. Stent left mid/proximal upper arm cephalic vein stenosis fluency 10mm x 60mm self-expanding covered stent. Balloon     VASCULAR SURGERY  2019    cont angioplasty stent with 10mm x 40mm conquest balloon. Completion venograms left upper extremity.        Family History  Family History   Problem Relation Age of Onset    Colon Cancer Mother          at 63    Colon Polyps Mother     Lung Cancer Father          at 79    Colon Polyps Father     Stomach Cancer Sister     Breast Cancer Sister 27    Depression Daughter 25        committed suicide    Liver Cancer Neg Hx     Liver Disease Neg Hx     Esophageal Cancer Neg Hx     Rectal Cancer Neg Hx        Social History  Social History     Socioeconomic History    Marital status: Single     Spouse name: Not on file    Number of children: 2    Years of education: Not on file    Highest education level: Not on file   Occupational History    Not on file   Tobacco Use    Smoking status: Current Every Day Smoker     Packs/day: 0.50     Years: 33.00     Pack years: 16.50     Types: Cigarettes    Smokeless tobacco: Never Used    Tobacco comment: NOW at 1/4 PD, started at age 17 and has averaged 2 PPD   Vaping Use    Vaping Use: Never used   Substance and Sexual Activity    Alcohol use: No    Drug use: Not Currently     Types: Marijuana [employer])    Sexual activity: Not Currently     Partners: Male   Other Topics Concern    Not on file   Social History Narrative    Not on file     Social Determinants of Health     Financial Resource Strain:     Difficulty of Paying Living Expenses: Not on file   Food Insecurity:     Worried About Running Out of Food in the Last Year: Not on file    Kendy of Food in the Last Year: Not on file   Transportation Needs:     Lack of Transportation (Medical): Not on file    Lack of Transportation (Non-Medical): Not on file   Physical Activity:     Days of Exercise per Week: Not on file    Minutes of Exercise per Session: Not on file   Stress:     Feeling of Stress : Not on file   Social Connections:     Frequency of Communication with Friends and Family: Not on file    Frequency of Social Gatherings with Friends and Family: Not on file    Attends [employer] Services: Not on file    Active Member of [address] [place] [address] or Organizations: Not on file    Attends Club or Organization Meetings: Not on file    Marital Status: Not on file   Intimate Partner Violence:     Fear of Current or Ex-Partner: Not on file    Emotionally Abused: Not on file    Physically Abused: Not on file    Sexually Abused: Not on file   Housing Stability:     Unable to Pay for Housing in the Last Year: Not on file    Number of [place] in the Last Year: Not on file    Unstable Housing in the Last Year: Not on file         Review of Systems:  Reviewed with the patient at the bedside, 6 points reviewed and negative except as noted above. Objective:  Blood pressure (!) 131/48, pulse 96, temperature 98.8 °F (37.1 °C), temperature source Temporal, resp. rate 26, height 5' 6\" (1.676 m), weight 131 lb (59.4 kg), SpO2 97 %.     Intake/Output Summary (Last 24 hours) at 5/14/2022 1000  Last data filed at 5/14/2022 0600  Gross per 24 hour   Intake 2260.82 ml   Output 0 ml   Net 2260.82 ml     General: trached, sedated  Chest: bilateral air entry, decreased breath sounds at the bases  CVS: regular rate and rhythm  Abdominal: soft, nontender, normal bowel sounds  Extremities: no cyanosis or edema  Skin: warm and dry without rash    Labs:  BMP:   Recent Labs     05/12/22 0144 05/13/22 0212 05/14/22 0137   * 132* 137   K 4.0 4.7 4.6   CL 93* 89* 94*   CO2 24 23 27   PHOS 5.0* 7.2* 5.7*   BUN 34* 58* 43*   CREATININE 2.4* 3.3* 2.4*   CALCIUM 9.0 9.3 9.0     CBC:   Recent Labs     05/12/22 0144 05/13/22 0212 05/14/22 0137   WBC 7.7 10.2 9.1   HGB 8.9* 8.3* 7.6*   HCT 29.6* 27.4* 25.5*   MCV 95.8 95.1 94.8    321 274     LIVER PROFILE:   Recent Labs     05/12/22 0144 05/13/22 0212 05/14/22 0137   AST 18 13 15   ALT 9 8 8   BILITOT 0.3 0.3 0.3   ALKPHOS 100 98 104     PT/INR:   Recent Labs     05/12/22 0144 05/13/22 0212 05/14/22 0137   PROTIME 14.4 14.8* 14.6   INR 1.12 1.16 1.14     APTT: No results for input(s): APTT in the last 72 hours. BNP:  No results for input(s): BNP in the last 72 hours. Ionized Calcium:No results for input(s): IONCA in the last 72 hours. Magnesium:  Recent Labs     05/12/22 0144 05/13/22 0212 05/14/22 0137   MG 2.4 2.6 2.3     Phosphorus:  Recent Labs     05/12/22 0144 05/13/22 0212 05/14/22 0137   PHOS 5.0* 7.2* 5.7*     HgbA1C: No results for input(s): LABA1C in the last 72 hours. Hepatic:   Recent Labs     05/12/22  0144 05/13/22  0212 05/14/22  0137   ALKPHOS 100 98 104   ALT 9 8 8   AST 18 13 15   PROT 6.0* 6.6 6.1*   BILITOT 0.3 0.3 0.3   LABALBU 3.0* 2.8* 3.0*     Lactic Acid: No results for input(s): LACTA in the last 72 hours. Troponin: No results for input(s): CKTOTAL, CKMB, TROPONINT in the last 72 hours. ABGs: No results for input(s): PH, PCO2, PO2, HCO3, O2SAT in the last 72 hours.   CRP:  No results for input(s): CRP in the last 72 hours. Sed Rate:  No results for input(s): SEDRATE in the last 72 hours. Cultures:   No results for input(s): CULTURE in the last 72 hours. No results for input(s): BC, Bretta Arturo in the last 72 hours. No results for input(s): CXSURG in the last 72 hours. Radiology reports as per the Radiologist  Radiology: CT Head WO Contrast    Result Date: 4/24/2022  CT HEAD WO CONTRAST 4/24/2022 2:01 PM HISTORY: Altered mental status COMPARISON: CT scan dated 11/18/2021 DOSE LENGTH PRODUCT: 766 mGy cm TECHNIQUE: Helical tomographic images of the brain were obtained without the use of intravenous contrast. Automated exposure control was also utilized to decrease patient radiation dose. FINDINGS: There is no evidence of evolving large vascular territory infarct. No visualized intra-axial or extra-axial hemorrhage. No mass lesion is identified. Normal size and configuration of the ventricular system. The basal cisterns are symmetric. Posterior fossa structures are unremarkable. The included orbits and their contents are unremarkable. Chronic paranasal sinus disease on the left. The visualized osseous structures and overlying soft tissues of the skull and face are unremarkable. 1. No acute intracranial process. Signed by Dr Yasmine Solis    Result Date: 4/24/2022  XR CHEST PORTABLE 4/24/2022 4:27 PM HISTORY: ET tube, enteric tube  Technique: Single AP view of the chest COMPARISONS: 4/24/2022 FINDINGS: Endotracheal tube is identified with tip essentially touching the krysta. Enteric tube courses into the stomach with tip curled in the fundus back towards the gastroesophageal junction. The lungs are clear and well expanded. Heart size is stable. Pulmonary vasculature are nondilated. No pleural effusion or pneumothorax. 1. Endotracheal tube is deep, tip essentially touching the krysta. Lungs are clear and well expanded. I would recommend 2-3 cm withdrawal for a more optimal positioning. The results of this exam were discussed with Eliseo Haskins (ICU nursing) on 4/24/2022 at 1640 hours Signed by Dr Babs Valdes    XR CHEST PORTABLE    Result Date: 4/24/2022  XR CHEST PORTABLE 4/24/2022 1:17 PM HISTORY: ET tube placement  Technique: Single AP view of the chest COMPARISONS: 4/24/2022 FINDINGS: Status post endotracheal intubation. ET tube is in good position. Lungs are well-expanded. Enteric tube curls on itself in the midesophagus and then extends back up into the throat. Heart size is stable. Pulmonary vasculature are nondilated. 1. ET tube is in good position. Lungs are clear and well expanded. 2. Enteric tube curls on itself in the mid esophagus before extending back up into the throat. This will need repositioned. The results of this exam were discussed with Dr. Yvrose Mason on 4/24/2022 at 1329 hours Signed by Dr Babs Valdes    XR CHEST PORTABLE    Result Date: 4/24/2022  XR CHEST PORTABLE 4/24/2022 12:34 PM HISTORY: Altered mental status  Technique: Single AP view of the chest COMPARISONS: Chest exam dated 4/1/2022 FINDINGS: No lung consolidation. No pleural effusion or pneumothorax. Cardiomediastinal silhouette and pulmonary vasculature are unremarkable. No acute bony abnormality. Left subclavian region vascular stent with additional stent projecting over the left humerus. No acute cardiopulmonary process. Signed by Dr Nelda Min   -ESRD  -DM2 with nephropathy on long-term insulin, uncontrolled  -HTN  -Hyponatremia  -Hypokalemia  -Secondary Hyperparathyroidism  -Anemia CKD  -Seizure disorder  -Hypothyroidism   -Acute resp failure  -Acute left MCA distribution infarct.       Plan:  Dialysis is due next on 5/17 . Follow up labs. Awaiting final disposition for long term vent weaning and continuing dialysis.        Poly Troy MD, MD  05/14/22  10:00 AM

## 2022-05-14 NOTE — PLAN OF CARE
Problem: Discharge Planning  Goal: Discharge to home or other facility with appropriate resources  Outcome: Progressing     Problem: Pain  Goal: Verbalizes/displays adequate comfort level or baseline comfort level  Outcome: Progressing     Problem: Chronic Conditions and Co-morbidities  Goal: Patient's chronic conditions and co-morbidity symptoms are monitored and maintained or improved  Outcome: Progressing     Problem: Nutrition Deficit:  Goal: Optimize nutritional status  Outcome: Progressing     Problem: Safety - Adult  Goal: Free from fall injury  Outcome: Progressing  Flowsheets (Taken 5/14/2022 4783)  Free From Fall Injury: Instruct family/caregiver on patient safety     Problem: ABCDS Injury Assessment  Goal: Absence of physical injury  Outcome: Progressing     Problem: Skin/Tissue Integrity  Goal: Absence of new skin breakdown  Description: 1. Monitor for areas of redness and/or skin breakdown  2. Assess vascular access sites hourly  3. Every 4-6 hours minimum:  Change oxygen saturation probe site  4. Every 4-6 hours:  If on nasal continuous positive airway pressure, respiratory therapy assess nares and determine need for appliance change or resting period. Outcome: Progressing     Problem: Safety - Medical Restraint  Goal: Remains free of injury from restraints (Restraint for Interference with Medical Device)  Description: INTERVENTIONS:  1. Determine that other, less restrictive measures have been tried or would not be effective before applying the restraint  2. Evaluate the patient's condition at the time of restraint application  3. Inform patient/family regarding the reason for restraint  4. Q2H: Monitor safety, psychosocial status, comfort, nutrition and hydration  Outcome: Progressing     Problem: Confusion  Goal: Confusion, delirium, dementia, or psychosis is improved or at baseline  Description: INTERVENTIONS:  1.  Assess for possible contributors to thought disturbance, including medications, impaired vision or hearing, underlying metabolic abnormalities, dehydration, psychiatric diagnoses, and notify attending LIP  2. [place] high risk fall precautions, as indicated  3. Provide frequent short contacts to provide reality reorientation, refocusing and direction  4. Decrease environmental stimuli, including noise as appropriate  5. Monitor and intervene to maintain adequate nutrition, hydration, elimination, sleep and activity  6. If unable to ensure safety without constant attention obtain sitter and review sitter guidelines with assigned personnel  7.  Initiate Psychosocial CNS and Spiritual Care consult, as indicated  Outcome: Progressing

## 2022-05-15 LAB
ABO/RH: NORMAL
ALBUMIN SERPL-MCNC: 2.6 G/DL (ref 3.5–5.2)
ALP BLD-CCNC: 98 U/L (ref 35–104)
ALT SERPL-CCNC: 7 U/L (ref 5–33)
ANION GAP SERPL CALCULATED.3IONS-SCNC: 20 MMOL/L (ref 7–19)
ANTIBODY SCREEN: NORMAL
AST SERPL-CCNC: 13 U/L (ref 5–32)
BASOPHILS ABSOLUTE: 0.1 K/UL (ref 0–0.2)
BASOPHILS RELATIVE PERCENT: 0.5 % (ref 0–1)
BILIRUB SERPL-MCNC: 0.3 MG/DL (ref 0.2–1.2)
BLOOD BANK DISPENSE STATUS: NORMAL
BLOOD BANK PRODUCT CODE: NORMAL
BPU ID: NORMAL
BUN BLDV-MCNC: 62 MG/DL (ref 6–20)
CALCIUM SERPL-MCNC: 9.3 MG/DL (ref 8.6–10)
CHLORIDE BLD-SCNC: 89 MMOL/L (ref 98–111)
CO2: 21 MMOL/L (ref 22–29)
CREAT SERPL-MCNC: 3.5 MG/DL (ref 0.5–0.9)
DESCRIPTION BLOOD BANK: NORMAL
EOSINOPHILS ABSOLUTE: 0.1 K/UL (ref 0–0.6)
EOSINOPHILS RELATIVE PERCENT: 1.1 % (ref 0–5)
GFR AFRICAN AMERICAN: 17
GFR NON-AFRICAN AMERICAN: 14
GLUCOSE BLD-MCNC: 165 MG/DL (ref 70–99)
GLUCOSE BLD-MCNC: 230 MG/DL (ref 74–109)
GLUCOSE BLD-MCNC: 243 MG/DL (ref 70–99)
GLUCOSE BLD-MCNC: 347 MG/DL (ref 70–99)
GLUCOSE BLD-MCNC: 364 MG/DL (ref 70–99)
HCT VFR BLD CALC: 22.2 % (ref 37–47)
HCT VFR BLD CALC: 27.7 % (ref 37–47)
HEMOGLOBIN: 6.7 G/DL (ref 12–16)
HEMOGLOBIN: 8.2 G/DL (ref 12–16)
IMMATURE GRANULOCYTES #: 0 K/UL
INR BLD: 1.14 (ref 0.88–1.18)
LYMPHOCYTES ABSOLUTE: 0.9 K/UL (ref 1.1–4.5)
LYMPHOCYTES RELATIVE PERCENT: 8.3 % (ref 20–40)
MAGNESIUM: 2.5 MG/DL (ref 1.6–2.6)
MCH RBC QN AUTO: 28.3 PG (ref 27–31)
MCHC RBC AUTO-ENTMCNC: 30.2 G/DL (ref 33–37)
MCV RBC AUTO: 93.7 FL (ref 81–99)
MONOCYTES ABSOLUTE: 0.9 K/UL (ref 0–0.9)
MONOCYTES RELATIVE PERCENT: 7.8 % (ref 0–10)
NEUTROPHILS ABSOLUTE: 9 K/UL (ref 1.5–7.5)
NEUTROPHILS RELATIVE PERCENT: 81.9 % (ref 50–65)
PDW BLD-RTO: 18.8 % (ref 11.5–14.5)
PERFORMED ON: ABNORMAL
PHOSPHORUS: 7.5 MG/DL (ref 2.5–4.5)
PLATELET # BLD: 278 K/UL (ref 130–400)
PMV BLD AUTO: 9.4 FL (ref 9.4–12.3)
POTASSIUM SERPL-SCNC: 5.5 MMOL/L (ref 3.5–5)
PROTHROMBIN TIME: 14.6 SEC (ref 12–14.6)
RBC # BLD: 2.37 M/UL (ref 4.2–5.4)
SODIUM BLD-SCNC: 130 MMOL/L (ref 136–145)
TOTAL PROTEIN: 6.2 G/DL (ref 6.6–8.7)
WBC # BLD: 10.9 K/UL (ref 4.8–10.8)

## 2022-05-15 PROCEDURE — 80053 COMPREHEN METABOLIC PANEL: CPT

## 2022-05-15 PROCEDURE — 2580000003 HC RX 258: Performed by: SURGERY

## 2022-05-15 PROCEDURE — 86901 BLOOD TYPING SEROLOGIC RH(D): CPT

## 2022-05-15 PROCEDURE — 6360000002 HC RX W HCPCS: Performed by: SURGERY

## 2022-05-15 PROCEDURE — 82947 ASSAY GLUCOSE BLOOD QUANT: CPT

## 2022-05-15 PROCEDURE — 2580000003 HC RX 258: Performed by: ANESTHESIOLOGY

## 2022-05-15 PROCEDURE — 85610 PROTHROMBIN TIME: CPT

## 2022-05-15 PROCEDURE — 94640 AIRWAY INHALATION TREATMENT: CPT

## 2022-05-15 PROCEDURE — 86923 COMPATIBILITY TEST ELECTRIC: CPT

## 2022-05-15 PROCEDURE — 94003 VENT MGMT INPAT SUBQ DAY: CPT

## 2022-05-15 PROCEDURE — 85014 HEMATOCRIT: CPT

## 2022-05-15 PROCEDURE — 86900 BLOOD TYPING SEROLOGIC ABO: CPT

## 2022-05-15 PROCEDURE — 84100 ASSAY OF PHOSPHORUS: CPT

## 2022-05-15 PROCEDURE — 6370000000 HC RX 637 (ALT 250 FOR IP): Performed by: SURGERY

## 2022-05-15 PROCEDURE — P9016 RBC LEUKOCYTES REDUCED: HCPCS

## 2022-05-15 PROCEDURE — 6370000000 HC RX 637 (ALT 250 FOR IP): Performed by: PSYCHIATRY & NEUROLOGY

## 2022-05-15 PROCEDURE — 2000000000 HC ICU R&B

## 2022-05-15 PROCEDURE — 2500000003 HC RX 250 WO HCPCS: Performed by: SURGERY

## 2022-05-15 PROCEDURE — 31502 CHANGE OF WINDPIPE AIRWAY: CPT

## 2022-05-15 PROCEDURE — 85025 COMPLETE CBC W/AUTO DIFF WBC: CPT

## 2022-05-15 PROCEDURE — 2700000000 HC OXYGEN THERAPY PER DAY

## 2022-05-15 PROCEDURE — 36415 COLL VENOUS BLD VENIPUNCTURE: CPT

## 2022-05-15 PROCEDURE — 6370000000 HC RX 637 (ALT 250 FOR IP): Performed by: INTERNAL MEDICINE

## 2022-05-15 PROCEDURE — 36430 TRANSFUSION BLD/BLD COMPNT: CPT

## 2022-05-15 PROCEDURE — 85018 HEMOGLOBIN: CPT

## 2022-05-15 PROCEDURE — 83735 ASSAY OF MAGNESIUM: CPT

## 2022-05-15 PROCEDURE — 86850 RBC ANTIBODY SCREEN: CPT

## 2022-05-15 RX ORDER — SODIUM CHLORIDE 9 MG/ML
INJECTION, SOLUTION INTRAVENOUS PRN
Status: DISCONTINUED | OUTPATIENT
Start: 2022-05-15 | End: 2022-05-17 | Stop reason: ALTCHOICE

## 2022-05-15 RX ORDER — AMLODIPINE BESYLATE 10 MG/1
10 TABLET ORAL DAILY
Status: DISCONTINUED | OUTPATIENT
Start: 2022-05-16 | End: 2022-05-19

## 2022-05-15 RX ADMIN — METOPROLOL SUCCINATE 100 MG: 50 TABLET, EXTENDED RELEASE ORAL at 07:41

## 2022-05-15 RX ADMIN — INSULIN LISPRO 1 UNITS: 100 INJECTION, SOLUTION INTRAVENOUS; SUBCUTANEOUS at 21:14

## 2022-05-15 RX ADMIN — Medication 10 ML: at 07:42

## 2022-05-15 RX ADMIN — HYDRALAZINE HYDROCHLORIDE 10 MG: 20 INJECTION INTRAMUSCULAR; INTRAVENOUS at 00:52

## 2022-05-15 RX ADMIN — ACETYLCYSTEINE 600 MG: 200 SOLUTION ORAL; RESPIRATORY (INHALATION) at 06:54

## 2022-05-15 RX ADMIN — INSULIN LISPRO 4 UNITS: 100 INJECTION, SOLUTION INTRAVENOUS; SUBCUTANEOUS at 11:15

## 2022-05-15 RX ADMIN — IPRATROPIUM BROMIDE AND ALBUTEROL SULFATE 1 AMPULE: 2.5; .5 SOLUTION RESPIRATORY (INHALATION) at 14:12

## 2022-05-15 RX ADMIN — HYDRALAZINE HYDROCHLORIDE 10 MG: 20 INJECTION INTRAMUSCULAR; INTRAVENOUS at 22:38

## 2022-05-15 RX ADMIN — IPRATROPIUM BROMIDE AND ALBUTEROL SULFATE 1 AMPULE: 2.5; .5 SOLUTION RESPIRATORY (INHALATION) at 02:03

## 2022-05-15 RX ADMIN — NIFEDIPINE 90 MG: 90 TABLET, FILM COATED, EXTENDED RELEASE ORAL at 07:41

## 2022-05-15 RX ADMIN — IPRATROPIUM BROMIDE AND ALBUTEROL SULFATE 1 AMPULE: 2.5; .5 SOLUTION RESPIRATORY (INHALATION) at 06:54

## 2022-05-15 RX ADMIN — ANORECTAL OINTMENT: 15.7; .44; 24; 20.6 OINTMENT TOPICAL at 04:54

## 2022-05-15 RX ADMIN — ROPINIROLE HYDROCHLORIDE 4 MG: 4 TABLET, FILM COATED ORAL at 21:14

## 2022-05-15 RX ADMIN — SODIUM CHLORIDE, PRESERVATIVE FREE 20 MG: 5 INJECTION INTRAVENOUS at 07:41

## 2022-05-15 RX ADMIN — HYDRALAZINE HYDROCHLORIDE 100 MG: 50 TABLET, FILM COATED ORAL at 04:55

## 2022-05-15 RX ADMIN — HYDRALAZINE HYDROCHLORIDE 100 MG: 50 TABLET, FILM COATED ORAL at 21:14

## 2022-05-15 RX ADMIN — LEVETIRACETAM 500 MG: 500 SOLUTION ORAL at 07:41

## 2022-05-15 RX ADMIN — COLLAGENASE SANTYL: 250 OINTMENT TOPICAL at 07:43

## 2022-05-15 RX ADMIN — CHLORHEXIDINE GLUCONATE 15 ML: 1.2 RINSE ORAL at 07:43

## 2022-05-15 RX ADMIN — LEVETIRACETAM 500 MG: 500 SOLUTION ORAL at 21:14

## 2022-05-15 RX ADMIN — IPRATROPIUM BROMIDE AND ALBUTEROL SULFATE 1 AMPULE: 2.5; .5 SOLUTION RESPIRATORY (INHALATION) at 18:03

## 2022-05-15 RX ADMIN — DULOXETINE 60 MG: 60 CAPSULE, DELAYED RELEASE ORAL at 07:41

## 2022-05-15 RX ADMIN — ACETYLCYSTEINE 600 MG: 200 SOLUTION ORAL; RESPIRATORY (INHALATION) at 18:03

## 2022-05-15 RX ADMIN — ANORECTAL OINTMENT: 15.7; .44; 24; 20.6 OINTMENT TOPICAL at 17:17

## 2022-05-15 RX ADMIN — CHLORHEXIDINE GLUCONATE 15 ML: 1.2 RINSE ORAL at 21:24

## 2022-05-15 RX ADMIN — INSULIN LISPRO 2 UNITS: 100 INJECTION, SOLUTION INTRAVENOUS; SUBCUTANEOUS at 16:14

## 2022-05-15 RX ADMIN — HEPARIN SODIUM 5000 UNITS: 5000 INJECTION INTRAVENOUS; SUBCUTANEOUS at 16:02

## 2022-05-15 RX ADMIN — PROPOFOL 20 MCG/KG/MIN: 10 INJECTION, EMULSION INTRAVENOUS at 12:47

## 2022-05-15 RX ADMIN — INSULIN LISPRO 5 UNITS: 100 INJECTION, SOLUTION INTRAVENOUS; SUBCUTANEOUS at 09:16

## 2022-05-15 RX ADMIN — HEPARIN SODIUM 5000 UNITS: 5000 INJECTION INTRAVENOUS; SUBCUTANEOUS at 21:14

## 2022-05-15 RX ADMIN — ATORVASTATIN CALCIUM 20 MG: 20 TABLET, FILM COATED ORAL at 07:41

## 2022-05-15 RX ADMIN — IPRATROPIUM BROMIDE AND ALBUTEROL SULFATE 1 AMPULE: 2.5; .5 SOLUTION RESPIRATORY (INHALATION) at 21:55

## 2022-05-15 RX ADMIN — IPRATROPIUM BROMIDE AND ALBUTEROL SULFATE 1 AMPULE: 2.5; .5 SOLUTION RESPIRATORY (INHALATION) at 10:25

## 2022-05-15 RX ADMIN — HEPARIN SODIUM 5000 UNITS: 5000 INJECTION INTRAVENOUS; SUBCUTANEOUS at 07:41

## 2022-05-15 RX ADMIN — COLLAGENASE SANTYL: 250 OINTMENT TOPICAL at 04:54

## 2022-05-15 RX ADMIN — LEVOTHYROXINE SODIUM 150 MCG: 75 TABLET ORAL at 07:41

## 2022-05-15 RX ADMIN — HYDRALAZINE HYDROCHLORIDE 100 MG: 50 TABLET, FILM COATED ORAL at 16:02

## 2022-05-15 RX ADMIN — ASPIRIN 81 MG 81 MG: 81 TABLET ORAL at 07:41

## 2022-05-15 RX ADMIN — ISOSORBIDE MONONITRATE 120 MG: 60 TABLET, EXTENDED RELEASE ORAL at 07:41

## 2022-05-15 RX ADMIN — PROPOFOL 20 MCG/KG/MIN: 10 INJECTION, EMULSION INTRAVENOUS at 23:21

## 2022-05-15 RX ADMIN — HYDRALAZINE HYDROCHLORIDE 10 MG: 20 INJECTION INTRAMUSCULAR; INTRAVENOUS at 05:42

## 2022-05-15 ASSESSMENT — PULMONARY FUNCTION TESTS
PIF_VALUE: 50
PIF_VALUE: 25
PIF_VALUE: 32
PIF_VALUE: 23
PIF_VALUE: 17
PIF_VALUE: 20
PIF_VALUE: 17
PIF_VALUE: 21
PIF_VALUE: 35
PIF_VALUE: 30
PIF_VALUE: 17
PIF_VALUE: 18
PIF_VALUE: 17
PIF_VALUE: 19
PIF_VALUE: 24
PIF_VALUE: 22
PIF_VALUE: 27
PIF_VALUE: 22
PIF_VALUE: 18
PIF_VALUE: 20
PIF_VALUE: 28
PIF_VALUE: 25
PIF_VALUE: 18
PIF_VALUE: 18
PIF_VALUE: 20
PIF_VALUE: 23
PIF_VALUE: 27
PIF_VALUE: 18
PIF_VALUE: 27

## 2022-05-15 ASSESSMENT — PAIN SCALES - GENERAL
PAINLEVEL_OUTOF10: 0

## 2022-05-15 NOTE — PROGRESS NOTES
Contacted secondary decision maker, Emily Wheat, for approval for blood administration, as primary decision maker could not be reached.

## 2022-05-15 NOTE — PROGRESS NOTES
Hospitalist Progress Note  [facility]     Patient: Papa Holland  : 1970  MRN: [id number]  Code Status: Full Code    Hospital Day: 21   Date of Service: 5/15/2022    Subjective:   Patient seen and examined. Trached and sedated.     Past Medical History:   Diagnosis Date    Acute ischemic stroke ([facility] 2022    Arthritis     Chronic kidney disease, stage 5, kidney failure (HCC)     Closed fracture of right shoulder girdle, with routine healing, subsequent encounter     DM (diabetes mellitus) type II controlled with renal manifestation ([facility] 1993    Gastroparesis     GERD (gastroesophageal reflux disease)     Hemodialysis patient ([facility]     dialysis on tues, thur, and sat at [facility]    Hypertension     Hypothyroid     Nasal congestion     recent    Neuropathy     Noncompliance with medications     Palliative care patient 10/08/2019    Restless leg syndrome        Past Surgical History:   Procedure Laterality Date    BREAST SURGERY Left     infected milk gland removed    BREAST SURGERY Right 2021    WEDGE EXCISION RIGHT BREAST DUCT WITH LACRIMAL DUCT PROBE, PEC BLOCK performed by Qamar Purvis MD at [address] [facility]          COLONOSCOPY Left 2020    Dr Dany Tirado hepatic flexure-Severe tortuosity with severe spasming, 1 yr recall    DIALYSIS FISTULA CREATION Left 2019    REVISION LEFT UPPER EXTREMITY AV ACCESS WITH LEFT BRACHIAL ARTERY TO LEFT AXILLARY VEIN WITH  INTERPOSITIONAL ARTEGRAFT   performed by Ani Griffith MD at [address]      GASTROSTOMY TUBE PLACEMENT N/A 5/10/2022    PERCUTANEOUS ENDOSCOPIC GASTROSTOMY TUBE PLACEMENT, performed by Shauna Matute MD at [address] Right 2019    INSERTION OF RIGHT INTERNAL JUGULAR HEMODIALYSIS CATHETER performed by Ani Griffith MD at [address] [facility]  2018    1.  Moderately increased stainable iron identified by special stain in Kupffer cells and occasional hepatocytes. Negative for evidence of significant portal or lobular inflammation. Negative for evidence of significant fibrosis    [place] COLONOSCOPY W/BIOPSY SINGLE/MULTIPLE N/A 10/20/2017    Dr Tristan Mancini prep-Tubular AP (-) dysplasia x 2--3 yr recall    [place] EGD TRANSORAL BIOPSY SINGLE/MULTIPLE N/A 2016    Dr Leandro Hill EGD TRANSORAL BIOPSY SINGLE/MULTIPLE N/A 10/20/2017    Dr Caitlin Phillips (-)    TRACHEOSTOMY  5/10/2022    TRACHEOSTOMY PERCUTANEOUS performed by Van Corrales MD at [address] VASCULAR SURGERY Left 2016    fistula by Dr Parley Aase at [address]  10/02/2017    [facility]. Left upper fistulograms/venograms, balloon angioplasty cephalic vein arch 03C43 conquest.    VASCULAR SURGERY  2018    FISTULOGRAM    VASCULAR SURGERY  10/29/2018    SJS. Left upper fistulograms/venograms    VASCULAR SURGERY  2018    SJS. Arch aortogram,left upper arteriograms.  VASCULAR SURGERY  2019    SJS. Removal of tunneled dialyis catheter right internal jugular vein.  VASCULAR SURGERY  2019    [facility]. Left upper extremity fistulogram including venography to the superior vena cava. Balloon angioplasty left subclavian vein with 10mm x 40mm conquest balloon. Balloon angioplasty mid/proximal upper arm cephalic vein with 39XP x 40mm conquest balloon. Venograms after balloon angioplasty. Stent left mid/proximal upper arm cephalic vein stenosis fluency 10mm x 60mm self-expanding covered stent. Balloon     VASCULAR SURGERY  2019    cont angioplasty stent with 10mm x 40mm conquest balloon. Completion venograms left upper extremity.        Family History   Problem Relation Age of Onset    Colon Cancer Mother          at 63    Colon Polyps Mother     Lung Cancer Father          at 66    Colon Polyps Father     Stomach Cancer Sister     Breast Cancer Sister 27    Depression Daughter 25 committed suicide    Liver Cancer Neg Hx     Liver Disease Neg Hx     Esophageal Cancer Neg Hx     Rectal Cancer Neg Hx        Social History     Socioeconomic History    Marital status: Single     Spouse name: Not on file    Number of children: 2    Years of education: Not on file    Highest education level: Not on file   Occupational History    Not on file   Tobacco Use    Smoking status: Current Every Day Smoker     Packs/day: 0.50     Years: 33.00     Pack years: 16.50     Types: Cigarettes    Smokeless tobacco: Never Used    Tobacco comment: NOW at 1/4 PD, started at age 24 and has averaged 2 PPD   Vaping Use    Vaping Use: Never used   Substance and Sexual Activity    Alcohol use: No    Drug use: Not Currently     Types: Marijuana Mahesh Relic)    Sexual activity: Not Currently     Partners: Male   Other Topics Concern    Not on file   Social History Narrative    Not on file     Social Determinants of Health     Financial Resource Strain:     Difficulty of Paying Living Expenses: Not on file   Food Insecurity:     Worried About Running Out of Food in the Last Year: Not on file    Kendy of Food in the Last Year: Not on file   Transportation Needs:     Lack of Transportation (Medical): Not on file    Lack of Transportation (Non-Medical):  Not on file   Physical Activity:     Days of Exercise per Week: Not on file    Minutes of Exercise per Session: Not on file   Stress:     Feeling of Stress : Not on file   Social Connections:     Frequency of Communication with Friends and Family: Not on file    Frequency of Social Gatherings with Friends and Family: Not on file    Attends [employer]ism Services: Not on file    Active Member of Clubs or Organizations: Not on file    Attends Club or Organization Meetings: Not on file    Marital Status: Not on file   Intimate Partner Violence:     Fear of Current or Ex-Partner: Not on file    Emotionally Abused: Not on file    Physically Abused: Not on file    Sexually Abused: Not on file   Housing Stability:     Unable to Pay for Housing in the Last Year: Not on file    Number of Places Lived in the Last Year: Not on file    Unstable Housing in the Last Year: Not on file       Current Facility-Administered Medications   Medication Dose Route Frequency Provider Last Rate Last Admin    0.9 % sodium chloride infusion   IntraVENous PRN Patience Walter,         insulin lispro (HUMALOG) injection vial 0-6 Units  0-6 Units SubCUTAneous TID  Maritza Allred MD   4 Units at 05/15/22 1115    insulin lispro (HUMALOG) injection vial 0-3 Units  0-3 Units SubCUTAneous Nightly Maritza Allred MD   1 Units at 05/14/22 2047    levETIRAcetam (KEPPRA) 100 MG/ML solution 500 mg  500 mg Per G Tube BID Girish Martinez MD   500 mg at 05/15/22 0741    atorvastatin (LIPITOR) tablet 20 mg  20 mg Oral Daily Maritza Allred MD   20 mg at 05/15/22 0741    sevelamer (RENVELA) tablet 800 mg  800 mg Oral TID  Maritza Allred MD   800 mg at 05/14/22 0820    rOPINIRole (REQUIP) tablet 4 mg  4 mg Oral Nightly Maritza Allred MD   4 mg at 05/14/22 2047    NIFEdipine (PROCARDIA XL) extended release tablet 90 mg  90 mg Oral Daily Maritza Allred MD   90 mg at 05/15/22 0741    metoprolol succinate (TOPROL XL) extended release tablet 100 mg  100 mg Oral QAM AC Maritza Allred MD   100 mg at 05/15/22 0741    levothyroxine (SYNTHROID) tablet 150 mcg  150 mcg Oral Daily Maritza Allred MD   150 mcg at 05/15/22 0741    isosorbide mononitrate (IMDUR) extended release tablet 120 mg  120 mg Oral Daily Maritza Allred MD   120 mg at 05/15/22 0741    DULoxetine (CYMBALTA) extended release capsule 60 mg  60 mg Oral Daily Maritza Allred MD   60 mg at 05/15/22 0741    collagenase ointment   Topical Daily Maritza Allred MD   Given at 05/15/22 0743    sodium chloride flush 0.9 % injection 5-40 mL  5-40 mL IntraVENous 2 times per day Khushboo Houser MD   10 mL at 05/15/22 0742    sodium chloride flush 0.9 % injection 5-40 mL  5-40 mL IntraVENous PRN Stephan Phipps MD        0.9 % sodium chloride infusion   IntraVENous PRN Stephan Phipps MD        HYDROmorphone HCl PF (DILAUDID) injection 0.25 mg  0.25 mg IntraVENous Q5 Min PRN Stephan Phipps MD        HYDROmorphone HCl PF (DILAUDID) injection 0.5 mg  0.5 mg IntraVENous Q5 Min PRN Stephan Phipps MD        [facility]) intermittent dosing (placeholder)   Other RX Henri Pa MD        aminoglycoside intermittent dosing (placeholder)   Other RX Henri Pa MD        acetylcysteine (MUCOMYST) 20 % solution 600 mg  600 mg Inhalation BID Nati Pope MD   600 mg at 05/15/22 0654    ipratropium-albuterol (DUONEB) nebulizer solution 1 ampule  1 ampule Inhalation Q4H Nati Pope MD   1 ampule at 05/15/22 1025    [Held by provider] insulin glargine (LANTUS) injection vial 20 Units  20 Units SubCUTAneous BID Nati Pope MD   20 Units at 05/13/22 2050    hydrALAZINE (APRESOLINE) injection 10 mg  10 mg IntraVENous Q6H PRN Nati Pope MD   10 mg at 05/15/22 0542    aspirin chewable tablet 81 mg  81 mg Oral Daily Nati Pope MD   81 mg at 05/15/22 0741    racepinephrine HCl (VAPONEFPRIN) 2.25 % nebulizer solution NEBU 11.25 mg  11.25 mg Nebulization Q4H PRN Nati Pope MD   11.25 mg at 05/03/22 1408    sodium chloride nebulizer 0.9 % solution 3 mL  3 mL Nebulization Q4H PRN Nati Pope MD        propofol injection  5-50 mcg/kg/min IntraVENous Continuous Nati Pope MD 3.6 mL/hr at 05/14/22 2323 10 mcg/kg/min at 05/14/22 2323    heparin (porcine) injection 5,000 Units  5,000 Units SubCUTAneous TID Nati Pope MD   5,000 Units at 05/15/22 0741    Peppermint Spirit SPRT   Does not apply PRN Nati Pope MD        menthol-zinc oxide (CALMOSEPTINE) 0.44-20.6 % ointment   Topical BID Jesús Powell MD   Given at 05/15/22 0454    sodium phosphate 10.5 mmol in sodium chloride 0.9 % 250 mL IVPB  0.16 mmol/kg IntraVENous PRN Hayden Camarillo MD        Or    sodium phosphate 21 mmol in sodium chloride 0.9 % 250 mL IVPB  0.32 mmol/kg IntraVENous PRN Hayden Camarillo MD        hydrALAZINE (APRESOLINE) tablet 100 mg  100 mg Oral 3 times per day Hayden Camarillo MD   100 mg at 05/15/22 0455    glucagon (rDNA) injection 1 mg  1 mg IntraMUSCular PRN Hayden Camarillo MD        dextrose 5 % solution  100 mL/hr IntraVENous PRN Hayden Camarillo MD        glucose chewable tablet 16 g  4 tablet Oral PRN Hayden Camarillo MD        dextrose bolus (hypoglycemia) 10% 125 mL  125 mL IntraVENous PRN Hayden Camarillo MD   Stopped at 05/11/22 2016    Or    dextrose bolus (hypoglycemia) 10% 250 mL  250 mL IntraVENous PRN Hayden Camarillo MD   Stopped at 05/10/22 2026    LORazepam (ATIVAN) injection 1 mg  1 mg IntraVENous Q5 Min PRN Hayden Camarillo MD        ondansetron (ZOFRAN-ODT) disintegrating tablet 4 mg  4 mg Oral Q8H PRN Hayden Camarillo MD        Or    [facility]dansetron [facility] injection 4 mg  4 mg IntraVENous Q6H PRN Hayden Camarillo MD        polyethylene glycol [facility]) packet 17 g  17 g Oral Daily PRN Hayden Camarillo MD        acetaminophen (TYLENOL) tablet 650 mg  650 mg Oral Q6H PRN Hayden Camarillo MD   650 mg at 04/25/22 0035    Or    acetaminophen (TYLENOL) suppository 650 mg  650 mg Rectal Q6H PRN Hayden Camarillo MD        chlorhexidine (PERIDEX) 0.12 % solution 15 mL  15 mL Mouth/Throat BID Hayden Camarillo MD   15 mL at 05/15/22 0743    famotidine (PEPCID) 20 mg in sodium chloride (PF) 10 mL injection  20 mg IntraVENous Daily Hayden Camarillo MD   20 mg at 05/15/22 0741    magic butt cream   Topical Q4H PRN Hayden Camarillo MD   Given at 05/03/22 0940    magnesium sulfate 1000 mg in dextrose 5% 100 mL IVPB  1,000 mg IntraVENous PRN Hayden Camarillo MD        potassium bicarb-citric acid (EFFER-K) effervescent tablet 40 mEq  40 mEq Oral PRN Hayden Camarillo MD        Or    potassium chloride 10 mEq/100 mL IVPB (Peripheral Line)  10 mEq IntraVENous PRN Siva Cardoso MD   Stopped at 04/25/22 2973         sodium chloride      sodium chloride      propofol 10 mcg/kg/min (05/14/22 2323)    dextrose          Objective:   BP (!) 114/57   Pulse 97   Temp 99 °F (37.2 °C) (Temporal)   Resp 14   Ht 5' 6\" (1.676 m)   Wt 134 lb 11.2 oz (61.1 kg)   SpO2 95%   BMI 21.74 kg/m²     General: no acute distress  HEENT: normocephalic, atraumatic  Lungs: faint rhonchi  Cardiovascular: s1 and s2 normal  Abdomen: soft, positive bowel sounds  Extremities: no cyanosis   Neuro: trached and sedated     Recent Labs     05/13/22  0212 05/13/22  0212 05/14/22  0137 05/15/22  0154 05/15/22  0906   WBC 10.2  --  9.1 10.9*  --    RBC 2.88*  --  2.69* 2.37*  --    HGB 8.3*   < > 7.6* 6.7* 8.2*   HCT 27.4*   < > 25.5* 22.2* 27.7*   MCV 95.1  --  94.8 93.7  --    MCH 28.8  --  28.3 28.3  --    MCHC 30.3*  --  29.8* 30.2*  --      --  274 278  --     < > = values in this interval not displayed. Recent Labs     05/13/22  0212 05/14/22  0137 05/15/22  0154   * 137 130*   K 4.7 4.6 5.5*   ANIONGAP 20* 16 20*   CL 89* 94* 89*   CO2 23 27 21*   BUN 58* 43* 62*   CREATININE 3.3* 2.4* 3.5*   GLUCOSE 96 90 230*   CALCIUM 9.3 9.0 9.3     Recent Labs     05/13/22  0212 05/14/22  0137 05/15/22  0154   MG 2.6 2.3 2.5   PHOS 7.2* 5.7* 7.5*     Recent Labs     05/13/22  0212 05/14/22  0137 05/15/22  0154   AST 13 15 13   ALT 8 8 7   BILITOT 0.3 0.3 0.3   ALKPHOS 98 104 98     No results for input(s): PH, PO2, PCO2, HCO3, BE, O2SAT in the last 72 hours. No results for input(s): TROPONINI in the last 72 hours. Recent Labs     05/13/22  0212 05/14/22  0137 05/15/22  0154   INR 1.16 1.14 1.14     No results for input(s): LACTA in the last 72 hours.       Intake/Output Summary (Last 24 hours) at 5/15/2022 1153  Last data filed at 5/15/2022 0800  Gross per 24 hour   Intake 2303.17 ml   Output 0 ml   Net 2303.17 ml       No results found.     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 Hari Ramos     ESRD on HD  Renal following     Medical noncompliance       C. difficile infection  Completed oral vancomycin course     Tobacco dependence       IDDM 2  Caution bouts of hypoglycemia     DVT prophylaxis  Heparin    Total critical care time: 40 minutes    Hadley Reese MD   5/15/2022 11:53 AM

## 2022-05-15 NOTE — PROGRESS NOTES
Nephrology ([facility])  Progress Note    Patient:  Frances Vera  YOB: 1970  Date of Service: 5/15/2022  MRN: [id number]   Acct: [de-identified]   Primary Care Physician: ROMINA Barraza  Advance Directive: Full Code  Admit Date: 4/24/2022       Hospital Day: 21  Referring Provider: Gunner Swanson MD    Patient Seen, Chart, Consults, Notes, Labs, Radiology studies reviewed. Subjective:  Frances Vera is a 47 y.o. female for whom we were consulted for evaluation and treatment of end-stage renal disease.  Patient also has a history of seizure disorder, type 2 diabetes, hypertension, frequent hospitalization with noncompliance and poorly controlled diabetes.  Patient was brought to the emergency room after she was found to have unresponsiveness and witnessed grand mal seizure. Alfa Bond was brought in by ambulance.  On arrival in the emergency room, she was found to be severely hyperglycemic and had a urinary tract infection.  Patient was intubated, sedated and treated by neurology for grand mal seizure. Alfa Bond was also receiving IV antibiotics and insulin drip. Kieran Chiang blood sugar control had significantly improved.  On April 25, she has received emergent hemodialysis treatment for correction of volume status as well as hyponatremia. She had an MRI head 4/29 that showed an acute left MCA distribution infarct. Patient remains on the ventilator and unable to participate in the history and physical examination. She is s/p trach and PEG placement on 5/10. She is s/p dialysis on 5/13. Patient is also waiting to be transferred to [facility]. Today, she remained lethargic and poorly responsive.       Allergies:  Penicillins, Lamisil advanced [terbinafine], Reglan [metoclopramide], and Stadol [butorphanol]    Medicines:  Current Facility-Administered Medications   Medication Dose Route Frequency Provider Last Rate Last Admin    0.9 % sodium chloride infusion   IntraVENous PRN Arlette Watson DO  insulin lispro (HUMALOG) injection vial 0-6 Units  0-6 Units SubCUTAneous TID  Keith Baker MD   5 Units at 05/15/22 0916    insulin lispro (HUMALOG) injection vial 0-3 Units  0-3 Units SubCUTAneous Nightly Keith Baker MD   1 Units at 05/14/22 2047    levETIRAcetam (KEPPRA) 100 MG/ML solution 500 mg  500 mg Per G Tube BID Dana Ramirez MD   500 mg at 05/15/22 0741    atorvastatin (LIPITOR) tablet 20 mg  20 mg Oral Daily Keith Baker MD   20 mg at 05/15/22 0741    sevelamer (RENVELA) tablet 800 mg  800 mg Oral TID  Keith Baker MD   800 mg at 05/14/22 0820    rOPINIRole (REQUIP) tablet 4 mg  4 mg Oral Nightly Keith Baker MD   4 mg at 05/14/22 2047    NIFEdipine (PROCARDIA XL) extended release tablet 90 mg  90 mg Oral Daily Keith Baker MD   90 mg at 05/15/22 0741    metoprolol succinate (TOPROL XL) extended release tablet 100 mg  100 mg Oral QAM AC Keith Baker MD   100 mg at 05/15/22 0741    levothyroxine (SYNTHROID) tablet 150 mcg  150 mcg Oral Daily Keith Baker MD   150 mcg at 05/15/22 0741    isosorbide mononitrate (IMDUR) extended release tablet 120 mg  120 mg Oral Daily Keith Baker MD   120 mg at 05/15/22 0741    DULoxetine (CYMBALTA) extended release capsule 60 mg  60 mg Oral Daily Keith Baker MD   60 mg at 05/15/22 0741    collagenase ointment   Topical Daily Keith Baker MD   Given at 05/15/22 0743    sodium chloride flush 0.9 % injection 5-40 mL  5-40 mL IntraVENous 2 times per day Ellery Schwab, MD   10 mL at 05/15/22 0742    sodium chloride flush 0.9 % injection 5-40 mL  5-40 mL IntraVENous PRN Ellery Schwab, MD        0.9 % sodium chloride infusion   IntraVENous PRN Ellery Schwab, MD        HYDROmorphone HCl PF (DILAUDID) injection 0.25 mg  0.25 mg IntraVENous Q5 Min PRN Ellery Schwab, MD        HYDROmorphone HCl PF (DILAUDID) injection 0.5 mg  0.5 mg IntraVENous Q5 Min PRN Ellery Schwab, MD        vancomycin [facility]) intermittent dosing (placeholder)   Other RX Placeholder Vitaliy Russell MD        aminoglycoside intermittent dosing (placeholder)   Other RX [facility] MD Frandy        acetylcysteine (MUCOMYST) 20 % solution 600 mg  600 mg Inhalation BID Vitaliy Russell MD   600 mg at 05/15/22 0654    ipratropium-albuterol (DUONEB) nebulizer solution 1 ampule  1 ampule Inhalation Q4H Vitaliy Russell MD   1 ampule at 05/15/22 0654    [Held by provider] insulin glargine (LANTUS) injection vial 20 Units  20 Units SubCUTAneous BID Vitaliy Russell MD   20 Units at 05/13/22 2050    hydrALAZINE (APRESOLINE) injection 10 mg  10 mg IntraVENous Q6H PRN Vitaliy Russell MD   10 mg at 05/15/22 0542    aspirin chewable tablet 81 mg  81 mg Oral Daily Vitaliy Russell MD   81 mg at 05/15/22 0741    racepinephrine HCl (VAPONEFPRIN) 2.25 % nebulizer solution NEBU 11.25 mg  11.25 mg Nebulization Q4H PRN Viatliy Russell MD   11.25 mg at 05/03/22 1408    sodium chloride nebulizer 0.9 % solution 3 mL  3 mL Nebulization Q4H PRN Vitaliy Russell MD        propofol injection  5-50 mcg/kg/min IntraVENous Continuous Vitaliy Russell MD 3.6 mL/hr at 05/14/22 2323 10 mcg/kg/min at 05/14/22 2323    heparin (porcine) injection 5,000 Units  5,000 Units SubCUTAneous TID Vitaliy Russell MD   5,000 Units at 05/15/22 [id number] [facility] SPRT   Does not apply PRN Vitaliy Russell MD        menthol-zinc oxide (CALMOSEPTINE) 0.44-20.6 % ointment   Topical BID Kaylie Damian MD   Given at 05/15/22 0454    sodium phosphate 10.5 mmol in sodium chloride 0.9 % 250 mL IVPB  0.16 mmol/kg IntraVENous PRN Vitaliy Russell MD        Or    sodium phosphate 21 mmol in sodium chloride 0.9 % 250 mL IVPB  0.32 mmol/kg IntraVENous PRN Vitaliy Russell MD        hydrALAZINE (APRESOLINE) tablet 100 mg  100 mg Oral 3 times per day Vitaliy Russell MD   100 mg at 05/15/22 0455    glucagon (rDNA) injection 1 mg  1 mg IntraMUSCular PRN Vitaliy Russell MD        dextrose 5 % solution 100 mL/hr IntraVENous PRDAVI Galarza MD        glucose chewable tablet 16 g  4 tablet Oral PRDAVI Galarza MD        dextrose bolus (hypoglycemia) 10% 125 mL  125 mL IntraVENous PRDAVI Galarza MD   Stopped at 05/11/22 2016    Or    dextrose bolus (hypoglycemia) 10% 250 mL  250 mL IntraVENous PRDAVI Galarza MD   Stopped at 05/10/22 2026    LORazepam (ATIVAN) injection 1 mg  1 mg IntraVENous Q5 Min PRDAVI Galarza MD        ondansetron (ZOFRAN-ODT) disintegrating tablet 4 mg  4 mg Oral Q8H PRDAVI Galarza MD        Or    [facility]dansetron [facility] injection 4 mg  4 mg IntraVENous Q6H PRDAVI Galarza MD        polyethylene glycol [facility]) packet 17 g  17 g Oral Daily PRDAVI Galarza MD        acetaminophen (TYLENOL) tablet 650 mg  650 mg Oral Q6H PRDAVI Galarza MD   650 mg at 04/25/22 0035    Or    acetaminophen (TYLENOL) suppository 650 mg  650 mg Rectal Q6H PRDAVI Galarza MD        chlorhexidine (PERIDEX) 0.12 % solution 15 mL  15 mL Mouth/Throat BID Herve Galarza MD   15 mL at 05/15/22 0743    famotidine (PEPCID) 20 mg in sodium chloride (PF) 10 mL injection  20 mg IntraVENous Daily Herve Galarza MD   20 mg at 05/15/22 7817    magic butt cream   Topical Q4H PRDAVI Galarza MD   Given at 05/03/22 0940    magnesium sulfate 1000 mg in dextrose 5% 100 mL IVPB  1,000 mg IntraVENous PRDAVI Galarza MD        potassium bicarb-citric acid (EFFER-K) effervescent tablet 40 mEq  40 mEq Oral PRDAVI Galarza MD        Or    potassium chloride 10 mEq/100 mL IVPB (Peripheral Line)  10 mEq IntraVENous PRDAVI Galarza MD   Stopped at 04/25/22 0340       Past Medical History:  Past Medical History:   Diagnosis Date    Acute ischemic stroke ([facility] [address] 4/30/2022    Arthritis     Chronic kidney disease, stage 5, kidney failure (HCC)     Closed fracture of right shoulder girdle, with routine healing, subsequent encounter     DM (diabetes mellitus) type II controlled with renal manifestation ([facility] 06/1993    Gastroparesis     GERD (gastroesophageal reflux disease)     Hemodialysis patient ([facility]     dialysis on tues, thur, and sat at [facility]    Hypertension     Hypothyroid     Nasal congestion     recent    Neuropathy     Noncompliance with medications     Palliative care patient 10/08/2019    Restless leg syndrome        Past Surgical History:  Past Surgical History:   Procedure Laterality Date    BREAST SURGERY Left 2008    infected milk gland removed    BREAST SURGERY Right 5/25/2021    WEDGE EXCISION RIGHT BREAST DUCT WITH LACRIMAL DUCT PROBE, PEC BLOCK performed by Yusuf Martin MD at [address], [facility]      2008    COLONOSCOPY Left 9/17/2020    Dr Gage Almodovar hepatic flexure-Severe tortuosity with severe spasming, 1 yr recall    DIALYSIS FISTULA CREATION Left 1/25/2019    REVISION LEFT UPPER EXTREMITY AV ACCESS WITH LEFT BRACHIAL ARTERY TO LEFT AXILLARY VEIN WITH  INTERPOSITIONAL ARTEGRAFT   performed by Johanna Schilling MD at [address]  2012    GASTROSTOMY TUBE PLACEMENT N/A 5/10/2022    PERCUTANEOUS ENDOSCOPIC GASTROSTOMY TUBE PLACEMENT, performed by Cindy Florence MD at [address] Right 1/25/2019    INSERTION OF RIGHT INTERNAL JUGULAR HEMODIALYSIS CATHETER performed by Johanna Schilling MD at [facility]  08/17/2018    1.  Moderately increased stainable iron identified by special stain in Kupffer cells and occasional hepatocytes. Negative for evidence of significant portal or lobular inflammation.  Negative for evidence of significant fibrosis    CT COLONOSCOPY W/BIOPSY SINGLE/MULTIPLE N/A 10/20/2017    Dr Juany Bruno prep-Tubular AP (-) dysplasia x 2--3 yr recall    CT EGD TRANSORAL BIOPSY SINGLE/MULTIPLE N/A 12/27/2016    Dr Sheridan Omer EGD TRANSORAL BIOPSY SINGLE/MULTIPLE N/A 10/20/2017    Dr Jones Morning (-)    TRACHEOSTOMY  5/10/2022 TRACHEOSTOMY PERCUTANEOUS performed by Steven Medrano MD at [address] VASCULAR SURGERY Left 2016    fistula by Dr Wilhemina Osler at [address]  10/02/2017    [facility]. Left upper fistulograms/venograms, balloon angioplasty cephalic vein arch [id number] conquest.    VASCULAR SURGERY  2018    FISTULOGRAM    VASCULAR SURGERY  10/29/2018    [facility]. Left upper fistulograms/venograms    VASCULAR SURGERY  2018    [facility]. Arch aortogram,left upper arteriograms.  VASCULAR SURGERY  2019    [facility]. Removal of tunneled dialyis catheter right internal jugular vein.  VASCULAR SURGERY  2019    [facility]. Left upper extremity fistulogram including venography to the superior vena cava. Balloon angioplasty left subclavian vein with 10mm x 40mm conquest balloon. Balloon angioplasty mid/proximal upper arm cephalic vein with 22VS x 40mm conquest balloon. Venograms after balloon angioplasty. Stent left mid/proximal upper arm cephalic vein stenosis fluency 10mm x 60mm self-expanding covered stent. Balloon     VASCULAR SURGERY  2019    cont angioplasty stent with 10mm x 40mm conquest balloon. Completion venograms left upper extremity.        Family History  Family History   Problem Relation Age of Onset    Colon Cancer Mother          at 63    Colon Polyps Mother     Lung Cancer Father          at 79    Colon Polyps Father     Stomach Cancer Sister     Breast Cancer Sister 27    Depression Daughter 25        committed suicide    Liver Cancer Neg Hx     Liver Disease Neg Hx     Esophageal Cancer Neg Hx     Rectal Cancer Neg Hx        Social History  Social History     Socioeconomic History    Marital status: Single     Spouse name: Not on file    Number of children: 2    Years of education: Not on file    Highest education level: Not on file   Occupational History    Not on file   Tobacco Use    Smoking status: Current Every Day Smoker     Packs/day: 0.50     Years: 33.00 Pack years: 16.50     Types: Cigarettes    Smokeless tobacco: Never Used    Tobacco comment: NOW at 1/4 PD, started at age 24 and has averaged 2 PPD   Vaping Use    Vaping Use: Never used   Substance and Sexual Activity    Alcohol use: No    Drug use: Not Currently     Types: Marijuana [place])    Sexual activity: Not Currently     Partners: Male   Other Topics Concern    Not on file   Social History Narrative    Not on file     Social Determinants of Health     Financial Resource Strain:     Difficulty of Paying Living Expenses: Not on file   Food Insecurity:     Worried About Running Out of Food in the Last Year: Not on file    Kendy of Food in the Last Year: Not on file   Transportation Needs:     Lack of Transportation (Medical): Not on file    Lack of Transportation (Non-Medical): Not on file   Physical Activity:     Days of Exercise per Week: Not on file    Minutes of Exercise per Session: Not on file   Stress:     Feeling of Stress : Not on file   Social Connections:     Frequency of Communication with Friends and Family: Not on file    Frequency of Social Gatherings with Friends and Family: Not on file    Attends Oriental orthodox Services: Not on file    Active Member of [address] [place] [address] [employer] or Organizations: Not on file    Attends Club or Organization Meetings: Not on file    Marital Status: Not on file   Intimate Partner Violence:     Fear of Current or Ex-Partner: Not on file    Emotionally Abused: Not on file    Physically Abused: Not on file    Sexually Abused: Not on file   Housing Stability:     Unable to Pay for Housing in the Last Year: Not on file    Number of [place] in the Last Year: Not on file    Unstable Housing in the Last Year: Not on file         Review of Systems:  Reviewed with the patient at the bedside, 6 points reviewed and negative except as noted above. Objective:  Blood pressure 127/60, pulse 95, temperature 99 °F (37.2 °C), temperature source Temporal, resp.  rate 16, height 5' 6\" (1.676 m), weight 134 lb 11.2 oz (61.1 kg), SpO2 95 %. Intake/Output Summary (Last 24 hours) at 5/15/2022 1004  Last data filed at 5/15/2022 0400  Gross per 24 hour   Intake 2087.17 ml   Output 0 ml   Net 2087.17 ml     General: trached, sedated  Chest: bilateral air entry, decreased breath sounds at the bases  CVS: regular rate and rhythm  Abdominal: soft, nontender, normal bowel sounds  Extremities: no cyanosis or edema  Skin: warm and dry without rash    Labs:  BMP:   Recent Labs     05/13/22  0212 05/14/22  0137 05/15/22  0154   * 137 130*   K 4.7 4.6 5.5*   CL 89* 94* 89*   CO2 23 27 21*   PHOS 7.2* 5.7* 7.5*   BUN 58* 43* 62*   CREATININE 3.3* 2.4* 3.5*   CALCIUM 9.3 9.0 9.3     CBC:   Recent Labs     05/13/22  0212 05/13/22  0212 05/14/22  0137 05/15/22  0154 05/15/22  0906   WBC 10.2  --  9.1 10.9*  --    HGB 8.3*   < > 7.6* 6.7* 8.2*   HCT 27.4*   < > 25.5* 22.2* 27.7*   MCV 95.1  --  94.8 93.7  --      --  274 278  --     < > = values in this interval not displayed. LIVER PROFILE:   Recent Labs     05/13/22  0212 05/14/22  0137 05/15/22  0154   AST 13 15 13   ALT 8 8 7   BILITOT 0.3 0.3 0.3   ALKPHOS 98 104 98     PT/INR:   Recent Labs     05/13/22  0212 05/14/22  0137 05/15/22  0154   PROTIME 14.8* 14.6 14.6   INR 1.16 1.14 1.14     APTT: No results for input(s): APTT in the last 72 hours. BNP:  No results for input(s): BNP in the last 72 hours. Ionized Calcium:No results for input(s): IONCA in the last 72 hours. Magnesium:  Recent Labs     05/13/22  0212 05/14/22  0137 05/15/22  0154   MG 2.6 2.3 2.5     Phosphorus:  Recent Labs     05/13/22  0212 05/14/22  0137 05/15/22  0154   PHOS 7.2* 5.7* 7.5*     HgbA1C: No results for input(s): LABA1C in the last 72 hours.   Hepatic:   Recent Labs     05/13/22  0212 05/14/22  0137 05/15/22  0154   ALKPHOS 98 104 98   ALT 8 8 7   AST 13 15 13   PROT 6.6 6.1* 6.2*   BILITOT 0.3 0.3 0.3   LABALBU 2.8* 3.0* 2.6*     Lactic Acid: No results for input(s): LACTA in the last 72 hours. Troponin: No results for input(s): CKTOTAL, CKMB, TROPONINT in the last 72 hours. ABGs: No results for input(s): PH, PCO2, PO2, HCO3, O2SAT in the last 72 hours. CRP:  No results for input(s): CRP in the last 72 hours. Sed Rate:  No results for input(s): SEDRATE in the last 72 hours. Cultures:   No results for input(s): CULTURE in the last 72 hours. No results for input(s): BC, April  in the last 72 hours. No results for input(s): CXSURG in the last 72 hours. Radiology reports as per the Radiologist  Radiology: CT Head WO Contrast    Result Date: 4/24/2022  CT HEAD WO CONTRAST 4/24/2022 2:01 PM HISTORY: Altered mental status COMPARISON: CT scan dated 11/18/2021 DOSE LENGTH PRODUCT: 766 mGy cm TECHNIQUE: Helical tomographic images of the brain were obtained without the use of intravenous contrast. Automated exposure control was also utilized to decrease patient radiation dose. FINDINGS: There is no evidence of evolving large vascular territory infarct. No visualized intra-axial or extra-axial hemorrhage. No mass lesion is identified. Normal size and configuration of the ventricular system. The basal cisterns are symmetric. Posterior fossa structures are unremarkable. The included orbits and their contents are unremarkable. Chronic paranasal sinus disease on the left. The visualized osseous structures and overlying soft tissues of the skull and face are unremarkable. 1. No acute intracranial process. Signed by Dr Kaleb Domingo    Result Date: 4/24/2022  XR CHEST PORTABLE 4/24/2022 4:27 PM HISTORY: ET tube, enteric tube  Technique: Single AP view of the chest COMPARISONS: 4/24/2022 FINDINGS: Endotracheal tube is identified with tip essentially touching the krysta. Enteric tube courses into the stomach with tip curled in the fundus back towards the gastroesophageal junction. The lungs are clear and well expanded.  Heart size is stable. Pulmonary vasculature are nondilated. No pleural effusion or pneumothorax. 1. Endotracheal tube is deep, tip essentially touching the krysta. Lungs are clear and well expanded. I would recommend 2-3 cm withdrawal for a more optimal positioning. The results of this exam were discussed with Blake Branch (ICU nursing) on 4/24/2022 at 1640 hours Signed by Dr Allyson Napier    XR CHEST PORTABLE    Result Date: 4/24/2022  XR CHEST PORTABLE 4/24/2022 1:17 PM HISTORY: ET tube placement  Technique: Single AP view of the chest COMPARISONS: 4/24/2022 FINDINGS: Status post endotracheal intubation. ET tube is in good position. Lungs are well-expanded. Enteric tube curls on itself in the midesophagus and then extends back up into the throat. Heart size is stable. Pulmonary vasculature are nondilated. 1. ET tube is in good position. Lungs are clear and well expanded. 2. Enteric tube curls on itself in the mid esophagus before extending back up into the throat. This will need repositioned. The results of this exam were discussed with Dr. Kalani Rosario on 4/24/2022 at 1329 hours Signed by Dr Allyson Napier    XR CHEST PORTABLE    Result Date: 4/24/2022  XR CHEST PORTABLE 4/24/2022 12:34 PM HISTORY: Altered mental status  Technique: Single AP view of the chest COMPARISONS: Chest exam dated 4/1/2022 FINDINGS: No lung consolidation. No pleural effusion or pneumothorax. Cardiomediastinal silhouette and pulmonary vasculature are unremarkable. No acute bony abnormality. Left subclavian region vascular stent with additional stent projecting over the left humerus. No acute cardiopulmonary process. Signed by Dr Tiffany Day   -ESRD  -DM2 with nephropathy on long-term insulin, uncontrolled  -HTN  -Hyponatremia  -Hypokalemia  -Secondary Hyperparathyroidism  -Anemia CKD  -Seizure disorder  -Hypothyroidism   -Acute resp failure  -Acute left MCA distribution infarct.       Plan:  Dialysis is due next on 5/17 .  Follow up labs. Awaiting final disposition for long term vent weaning and continuing dialysis. Overall poor prognosis due to her neuro status.        Lavell Gamboa MD, MD  05/15/22  10:04 AM

## 2022-05-16 LAB
ALBUMIN SERPL-MCNC: 2.9 G/DL (ref 3.5–5.2)
ALP BLD-CCNC: 114 U/L (ref 35–104)
ALT SERPL-CCNC: 8 U/L (ref 5–33)
ANION GAP SERPL CALCULATED.3IONS-SCNC: 26 MMOL/L (ref 7–19)
AST SERPL-CCNC: 13 U/L (ref 5–32)
BASOPHILS ABSOLUTE: 0.1 K/UL (ref 0–0.2)
BASOPHILS RELATIVE PERCENT: 0.8 % (ref 0–1)
BILIRUB SERPL-MCNC: 0.4 MG/DL (ref 0.2–1.2)
BUN BLDV-MCNC: 89 MG/DL (ref 6–20)
CALCIUM SERPL-MCNC: 9.3 MG/DL (ref 8.6–10)
CHLORIDE BLD-SCNC: 85 MMOL/L (ref 98–111)
CO2: 19 MMOL/L (ref 22–29)
CREAT SERPL-MCNC: 4.4 MG/DL (ref 0.5–0.9)
EOSINOPHILS ABSOLUTE: 0.1 K/UL (ref 0–0.6)
EOSINOPHILS RELATIVE PERCENT: 1.1 % (ref 0–5)
GENTAMICIN DOSE AMOUNT: NORMAL
GENTAMICIN RANDOM: 3.4 UG/ML
GFR AFRICAN AMERICAN: 13
GFR NON-AFRICAN AMERICAN: 10
GLUCOSE BLD-MCNC: 195 MG/DL (ref 70–99)
GLUCOSE BLD-MCNC: 296 MG/DL (ref 70–99)
GLUCOSE BLD-MCNC: 303 MG/DL (ref 74–109)
GLUCOSE BLD-MCNC: 356 MG/DL (ref 70–99)
GLUCOSE BLD-MCNC: 415 MG/DL (ref 70–99)
HCT VFR BLD CALC: 27.2 % (ref 37–47)
HEMOGLOBIN: 8.1 G/DL (ref 12–16)
IMMATURE GRANULOCYTES #: 0.1 K/UL
INR BLD: 1.19 (ref 0.88–1.18)
LYMPHOCYTES ABSOLUTE: 0.6 K/UL (ref 1.1–4.5)
LYMPHOCYTES RELATIVE PERCENT: 5.1 % (ref 20–40)
MAGNESIUM: 2.7 MG/DL (ref 1.6–2.6)
MCH RBC QN AUTO: 28.7 PG (ref 27–31)
MCHC RBC AUTO-ENTMCNC: 29.8 G/DL (ref 33–37)
MCV RBC AUTO: 96.5 FL (ref 81–99)
MONOCYTES ABSOLUTE: 0.6 K/UL (ref 0–0.9)
MONOCYTES RELATIVE PERCENT: 5.1 % (ref 0–10)
NEUTROPHILS ABSOLUTE: 10 K/UL (ref 1.5–7.5)
NEUTROPHILS RELATIVE PERCENT: 87.4 % (ref 50–65)
PDW BLD-RTO: 18.6 % (ref 11.5–14.5)
PERFORMED ON: ABNORMAL
PHOSPHORUS: 8.8 MG/DL (ref 2.5–4.5)
PLATELET # BLD: 329 K/UL (ref 130–400)
PMV BLD AUTO: 9.7 FL (ref 9.4–12.3)
POTASSIUM SERPL-SCNC: 6.7 MMOL/L (ref 3.5–5)
PROTHROMBIN TIME: 15.1 SEC (ref 12–14.6)
RBC # BLD: 2.82 M/UL (ref 4.2–5.4)
SODIUM BLD-SCNC: 130 MMOL/L (ref 136–145)
TOTAL PROTEIN: 6 G/DL (ref 6.6–8.7)
VANCOMYCIN RANDOM: 24.2 UG/ML
WBC # BLD: 11.5 K/UL (ref 4.8–10.8)

## 2022-05-16 PROCEDURE — 2700000000 HC OXYGEN THERAPY PER DAY

## 2022-05-16 PROCEDURE — 80202 ASSAY OF VANCOMYCIN: CPT

## 2022-05-16 PROCEDURE — 6370000000 HC RX 637 (ALT 250 FOR IP): Performed by: INTERNAL MEDICINE

## 2022-05-16 PROCEDURE — 85025 COMPLETE CBC W/AUTO DIFF WBC: CPT

## 2022-05-16 PROCEDURE — 94640 AIRWAY INHALATION TREATMENT: CPT

## 2022-05-16 PROCEDURE — 6360000002 HC RX W HCPCS: Performed by: SURGERY

## 2022-05-16 PROCEDURE — 36415 COLL VENOUS BLD VENIPUNCTURE: CPT

## 2022-05-16 PROCEDURE — 6370000000 HC RX 637 (ALT 250 FOR IP): Performed by: SURGERY

## 2022-05-16 PROCEDURE — 2580000003 HC RX 258: Performed by: INTERNAL MEDICINE

## 2022-05-16 PROCEDURE — 94003 VENT MGMT INPAT SUBQ DAY: CPT

## 2022-05-16 PROCEDURE — 6370000000 HC RX 637 (ALT 250 FOR IP): Performed by: PSYCHIATRY & NEUROLOGY

## 2022-05-16 PROCEDURE — 2500000003 HC RX 250 WO HCPCS: Performed by: SURGERY

## 2022-05-16 PROCEDURE — 83735 ASSAY OF MAGNESIUM: CPT

## 2022-05-16 PROCEDURE — 2580000003 HC RX 258: Performed by: SURGERY

## 2022-05-16 PROCEDURE — 6360000002 HC RX W HCPCS: Performed by: INTERNAL MEDICINE

## 2022-05-16 PROCEDURE — 2580000003 HC RX 258: Performed by: ANESTHESIOLOGY

## 2022-05-16 PROCEDURE — 2500000003 HC RX 250 WO HCPCS

## 2022-05-16 PROCEDURE — 99232 SBSQ HOSP IP/OBS MODERATE 35: CPT

## 2022-05-16 PROCEDURE — 80170 ASSAY OF GENTAMICIN: CPT

## 2022-05-16 PROCEDURE — 84100 ASSAY OF PHOSPHORUS: CPT

## 2022-05-16 PROCEDURE — 80053 COMPREHEN METABOLIC PANEL: CPT

## 2022-05-16 PROCEDURE — 85610 PROTHROMBIN TIME: CPT

## 2022-05-16 PROCEDURE — 8010000000 HC HEMODIALYSIS ACUTE INPT

## 2022-05-16 PROCEDURE — 2000000000 HC ICU R&B

## 2022-05-16 PROCEDURE — 99233 SBSQ HOSP IP/OBS HIGH 50: CPT | Performed by: PSYCHIATRY & NEUROLOGY

## 2022-05-16 PROCEDURE — 82947 ASSAY GLUCOSE BLOOD QUANT: CPT

## 2022-05-16 RX ORDER — SEVELAMER CARBONATE FOR ORAL SUSPENSION 2400 MG/1
0.8 POWDER, FOR SUSPENSION ORAL
Status: DISCONTINUED | OUTPATIENT
Start: 2022-05-16 | End: 2022-05-16 | Stop reason: SDUPTHER

## 2022-05-16 RX ORDER — SEVELAMER CARBONATE FOR ORAL SUSPENSION 800 MG/1
0.8 POWDER, FOR SUSPENSION ORAL
Status: DISCONTINUED | OUTPATIENT
Start: 2022-05-16 | End: 2022-05-25 | Stop reason: HOSPADM

## 2022-05-16 RX ORDER — CALCIUM GLUCONATE 20 MG/ML
1000 INJECTION, SOLUTION INTRAVENOUS ONCE
Status: COMPLETED | OUTPATIENT
Start: 2022-05-16 | End: 2022-05-16

## 2022-05-16 RX ADMIN — VANCOMYCIN HYDROCHLORIDE 750 MG: 750 INJECTION, POWDER, LYOPHILIZED, FOR SOLUTION INTRAVENOUS at 16:54

## 2022-05-16 RX ADMIN — SODIUM ZIRCONIUM CYCLOSILICATE 10 G: 10 POWDER, FOR SUSPENSION ORAL at 21:47

## 2022-05-16 RX ADMIN — DULOXETINE 60 MG: 60 CAPSULE, DELAYED RELEASE ORAL at 12:56

## 2022-05-16 RX ADMIN — ANORECTAL OINTMENT: 15.7; .44; 24; 20.6 OINTMENT TOPICAL at 17:50

## 2022-05-16 RX ADMIN — SODIUM CHLORIDE, PRESERVATIVE FREE 20 MG: 5 INJECTION INTRAVENOUS at 12:57

## 2022-05-16 RX ADMIN — IPRATROPIUM BROMIDE AND ALBUTEROL SULFATE 1 AMPULE: 2.5; .5 SOLUTION RESPIRATORY (INHALATION) at 01:59

## 2022-05-16 RX ADMIN — LEVETIRACETAM 500 MG: 500 SOLUTION ORAL at 21:47

## 2022-05-16 RX ADMIN — AMLODIPINE BESYLATE 10 MG: 10 TABLET ORAL at 00:02

## 2022-05-16 RX ADMIN — HEPARIN SODIUM 5000 UNITS: 5000 INJECTION INTRAVENOUS; SUBCUTANEOUS at 15:07

## 2022-05-16 RX ADMIN — CHLORHEXIDINE GLUCONATE 15 ML: 1.2 RINSE ORAL at 22:19

## 2022-05-16 RX ADMIN — Medication 10 ML: at 22:19

## 2022-05-16 RX ADMIN — PROPOFOL 10 MCG/KG/MIN: 10 INJECTION, EMULSION INTRAVENOUS at 19:24

## 2022-05-16 RX ADMIN — LEVETIRACETAM 500 MG: 500 SOLUTION ORAL at 12:56

## 2022-05-16 RX ADMIN — GENTAMICIN SULFATE 182.4 MG: 40 INJECTION, SOLUTION INTRAMUSCULAR; INTRAVENOUS at 18:06

## 2022-05-16 RX ADMIN — ROPINIROLE HYDROCHLORIDE 4 MG: 4 TABLET, FILM COATED ORAL at 21:47

## 2022-05-16 RX ADMIN — SODIUM ZIRCONIUM CYCLOSILICATE 10 G: 10 POWDER, FOR SUSPENSION ORAL at 15:07

## 2022-05-16 RX ADMIN — ASPIRIN 81 MG 81 MG: 81 TABLET ORAL at 12:56

## 2022-05-16 RX ADMIN — IPRATROPIUM BROMIDE AND ALBUTEROL SULFATE 1 AMPULE: 2.5; .5 SOLUTION RESPIRATORY (INHALATION) at 14:59

## 2022-05-16 RX ADMIN — ANORECTAL OINTMENT: 15.7; .44; 24; 20.6 OINTMENT TOPICAL at 05:12

## 2022-05-16 RX ADMIN — HEPARIN SODIUM 5000 UNITS: 5000 INJECTION INTRAVENOUS; SUBCUTANEOUS at 21:47

## 2022-05-16 RX ADMIN — LEVOTHYROXINE SODIUM 150 MCG: 75 TABLET ORAL at 12:57

## 2022-05-16 RX ADMIN — HYDRALAZINE HYDROCHLORIDE 100 MG: 50 TABLET, FILM COATED ORAL at 22:02

## 2022-05-16 RX ADMIN — SEVELAMER CARBONATE 0.8 G: 800 POWDER, FOR SUSPENSION ORAL at 17:45

## 2022-05-16 RX ADMIN — HEPARIN SODIUM 5000 UNITS: 5000 INJECTION INTRAVENOUS; SUBCUTANEOUS at 08:25

## 2022-05-16 RX ADMIN — INSULIN LISPRO 1 UNITS: 100 INJECTION, SOLUTION INTRAVENOUS; SUBCUTANEOUS at 12:58

## 2022-05-16 RX ADMIN — ACETYLCYSTEINE 600 MG: 200 SOLUTION ORAL; RESPIRATORY (INHALATION) at 18:55

## 2022-05-16 RX ADMIN — INSULIN LISPRO 5 UNITS: 100 INJECTION, SOLUTION INTRAVENOUS; SUBCUTANEOUS at 08:25

## 2022-05-16 RX ADMIN — CALCIUM GLUCONATE 1000 MG: 20 INJECTION, SOLUTION INTRAVENOUS at 06:33

## 2022-05-16 RX ADMIN — IPRATROPIUM BROMIDE AND ALBUTEROL SULFATE 1 AMPULE: 2.5; .5 SOLUTION RESPIRATORY (INHALATION) at 22:12

## 2022-05-16 RX ADMIN — IPRATROPIUM BROMIDE AND ALBUTEROL SULFATE 1 AMPULE: 2.5; .5 SOLUTION RESPIRATORY (INHALATION) at 18:55

## 2022-05-16 RX ADMIN — NIFEDIPINE 90 MG: 90 TABLET, FILM COATED, EXTENDED RELEASE ORAL at 12:56

## 2022-05-16 RX ADMIN — HYDRALAZINE HYDROCHLORIDE 100 MG: 50 TABLET, FILM COATED ORAL at 05:11

## 2022-05-16 RX ADMIN — COLLAGENASE SANTYL: 250 OINTMENT TOPICAL at 13:34

## 2022-05-16 RX ADMIN — IPRATROPIUM BROMIDE AND ALBUTEROL SULFATE 1 AMPULE: 2.5; .5 SOLUTION RESPIRATORY (INHALATION) at 06:27

## 2022-05-16 RX ADMIN — Medication 10 ML: at 08:26

## 2022-05-16 RX ADMIN — INSULIN LISPRO 3 UNITS: 100 INJECTION, SOLUTION INTRAVENOUS; SUBCUTANEOUS at 21:48

## 2022-05-16 RX ADMIN — IPRATROPIUM BROMIDE AND ALBUTEROL SULFATE 1 AMPULE: 2.5; .5 SOLUTION RESPIRATORY (INHALATION) at 10:17

## 2022-05-16 RX ADMIN — ISOSORBIDE MONONITRATE 120 MG: 60 TABLET, EXTENDED RELEASE ORAL at 12:55

## 2022-05-16 RX ADMIN — ATORVASTATIN CALCIUM 20 MG: 20 TABLET, FILM COATED ORAL at 12:57

## 2022-05-16 RX ADMIN — INSULIN LISPRO 3 UNITS: 100 INJECTION, SOLUTION INTRAVENOUS; SUBCUTANEOUS at 17:45

## 2022-05-16 RX ADMIN — CHLORHEXIDINE GLUCONATE 15 ML: 1.2 RINSE ORAL at 12:57

## 2022-05-16 RX ADMIN — ACETYLCYSTEINE 600 MG: 200 SOLUTION ORAL; RESPIRATORY (INHALATION) at 06:28

## 2022-05-16 ASSESSMENT — PAIN SCALES - GENERAL
PAINLEVEL_OUTOF10: 0

## 2022-05-16 ASSESSMENT — PULMONARY FUNCTION TESTS
PIF_VALUE: 15
PIF_VALUE: 18
PIF_VALUE: 17
PIF_VALUE: 19
PIF_VALUE: 17
PIF_VALUE: 17
PIF_VALUE: 21
PIF_VALUE: 18
PIF_VALUE: 21
PIF_VALUE: 16
PIF_VALUE: 24
PIF_VALUE: 16
PIF_VALUE: 18
PIF_VALUE: 22
PIF_VALUE: 20
PIF_VALUE: 17
PIF_VALUE: 19
PIF_VALUE: 17
PIF_VALUE: 16
PIF_VALUE: 22
PIF_VALUE: 20
PIF_VALUE: 16
PIF_VALUE: 19
PIF_VALUE: 15
PIF_VALUE: 17

## 2022-05-16 NOTE — PROGRESS NOTES
Palliative Care Progress Note  5/16/2022 8:02 AM    Patient:  Moni Cho  YOB: 1970  Primary Care Physician: ROMINA Ramires  Advance Directive: Full Code  Admit Date: 4/24/2022       Hospital Day: 25  Portions of this note have been copied forward, however, changed to reflect the most current clinical status of this patient. CHIEF COMPLAINT/REASON FOR CONSULTATION Goals of care    SUBJECTIVE:  Ms. Alicia Barrett is now in ICU. Trach/sedation continued. Awaiting LTACH acceptance    Review of Systems:   14 point review of systems is negative except as specifically addressed above. Objective:   VITALS:  BP (!) 156/64   Pulse 93   Temp 99.2 °F (37.3 °C) (Temporal)   Resp 15   Ht 5' 6\" (1.676 m)   Wt 134 lb (60.8 kg)   SpO2 99%   BMI 21.63 kg/m²   24HR INTAKE/OUTPUT:      Intake/Output Summary (Last 24 hours) at 5/16/2022 0802  Last data filed at 5/16/2022 0400  Gross per 24 hour   Intake 1687.93 ml   Output 0 ml   Net 1687.93 ml     General appearance: 48 yo female, chronically ill appearing, trached/sedated, no acute distress  Head: Normocephalic, without obvious abnormality, atraumatic  Eyes: conjunctivae/corneas clear. Pupils sluggish  Ears: normal external ears and nose  Neck: supple, symmetrical, trachea midline, fresh trach present  Lungs: clear bilaterally to auscultation and diminished in bases, mechanically ventilated, equal chest rise  Heart: regular rate and rhythm, S1, S2 normal, no murmur  Abdomen: soft, no grimacing w/ palpation; non-distended, bowel sounds present.  FMS in place with small diarrhea noted   Extremities: trace edema in BUE, No lower extremity edema,  No erythema, no grimacing w/ palpation   Skin: warm, dry, pale  Neurologic: trach/sedated, does not respond to voice, withdrawals to pain, spontaneously moving extremities, less on right side    Medications:      sodium chloride      sodium chloride      propofol 10 mcg/kg/min (05/16/22 0531)    dextrose        sodium zirconium cyclosilicate  10 g Oral TID    amLODIPine  10 mg Oral Daily    insulin lispro  0-6 Units SubCUTAneous TID WC    insulin lispro  0-3 Units SubCUTAneous Nightly    levETIRAcetam  500 mg Per G Tube BID    atorvastatin  20 mg Oral Daily    sevelamer  800 mg Oral TID WC    rOPINIRole  4 mg Oral Nightly    NIFEdipine  90 mg Oral Daily    metoprolol succinate  100 mg Oral QAM AC    levothyroxine  150 mcg Oral Daily    isosorbide mononitrate  120 mg Oral Daily    DULoxetine  60 mg Oral Daily    collagenase   Topical Daily    sodium chloride flush  5-40 mL IntraVENous 2 times per day    vancomycin (VANCOCIN) intermittent dosing (placeholder)   Other RX Placeholder    aminoglycoside intermittent dosing (placeholder)   Other RX Placeholder    acetylcysteine  600 mg Inhalation BID    ipratropium-albuterol  1 ampule Inhalation Q4H    [Held by provider] insulin glargine  20 Units SubCUTAneous BID    aspirin  81 mg Oral Daily    heparin (porcine)  5,000 Units SubCUTAneous TID    menthol-zinc oxide   Topical BID    hydrALAZINE  100 mg Oral 3 times per day    chlorhexidine  15 mL Mouth/Throat BID    famotidine (PEPCID) injection  20 mg IntraVENous Daily     sodium chloride, sodium chloride flush, sodium chloride, HYDROmorphone, HYDROmorphone, hydrALAZINE, racepinephrine HCl, sodium chloride nebulizer, Peppermint Spirit, sodium phosphate IVPB **OR** sodium phosphate IVPB, glucagon (rDNA), dextrose, glucose, dextrose bolus **OR** dextrose bolus, LORazepam, ondansetron **OR** ondansetron, polyethylene glycol, acetaminophen **OR** acetaminophen, magic butt cream, magnesium sulfate, potassium alternative oral replacement **OR** potassium chloride  Diet NPO Exceptions are: Sips of Water with Meds  ADULT TUBE FEEDING; PEG; Peptide Based High Protein; Continuous; 20; Yes; 10; Q 6 hours; 48; 0; Other (specify); no free water flush     Lab and other Data:     Recent Labs     05/14/22  4685 05/14/22  0137 05/15/22  0154 05/15/22  0906 05/16/22  0454   WBC 9.1  --  10.9*  --  11.5*   HGB 7.6*   < > 6.7* 8.2* 8.1*     --  278  --  329    < > = values in this interval not displayed. Recent Labs     05/14/22  0137 05/15/22  0154 05/16/22  0454    130* 130*   K 4.6 5.5* 6.7*   CL 94* 89* 85*   CO2 27 21* 19*   BUN 43* 62* 89*   CREATININE 2.4* 3.5* 4.4*   GLUCOSE 90 230* 303*     Recent Labs     05/14/22  0137 05/15/22  0154 05/16/22  0454   AST 15 13 13   ALT 8 7 8   BILITOT 0.3 0.3 0.4   ALKPHOS 104 98 114*       Assessment/Plan   Principal Problem:    Acute ischemic stroke (HCC)  Active Problems:    Weakness    Severe malnutrition (HCC)    Gastroesophageal reflux disease without esophagitis    Acquired hypothyroidism    Anemia in chronic kidney disease (CKD)    Type 2 diabetes mellitus with chronic kidney disease on chronic dialysis, with long-term current use of insulin (HCC)    Acute encephalopathy    Respiratory failure ([address]    Uncontrolled type 2 diabetes mellitus with complication, with long-term current use of insulin (HCC)    ESRD (end stage renal disease) on dialysis (HCC)    PVD (peripheral vascular disease) (HCC)    ESRD on hemodialysis ([address]    Hyponatremia    Hyperglycemia due to type 2 diabetes mellitus ([address]    Uncontrolled hypertension    Seizure ([address]    Palliative care patient    Uncontrolled type 2 diabetes mellitus with hyperosmolar nonketotic hyperglycemia (HCC)    Altered mental state    Acute hypoxemic respiratory failure (HCC)    Generalized weakness    UTI (urinary tract infection)    Closed fracture of left distal femur (HCC)    History of infection with vancomycin resistant Enterococcus (VRE)  Resolved Problems:    * No resolved hospital problems. *      Visit Summary:  Chart reviewed, patient discussed with nursing staff. Reviewed health issues, work up and treatment plan as well as factors that lead to hospitalization.  Ms. Laine Maradiaga is seen at bedside this morning current receiving HD treatment. No acute events this weekend. She is POD#6 trach/peg. CM continues to assist in d/c planning for transfer to [facility]. She is clinically unchanged. I attempted to call her significant other/legal decision maker, Tita Kee, to update on pts status and plan of care with no answer. VM left with my call back number. Will follow. Received a call back from Tita Kee this afternoon and we discussed pts current status and insurance issues for [facility] transfer. He verbalizes understanding and expresses frustration that they had required trach/PEG for her transfer but are now changing it to waiting until after first trach change. He states he has spoken with staff at the [facility] as well and he is hopeful she can transfer there this week. Emotional support and opportunity for questions provided. Will follow. Recommendations:   1. Palliative care: GOC continue all aggressive measures. Sig other agreeable to [facility] transfer once approved. Code status: FULL CODE    2. Acute hypoxemic respiratory failure- Extubated 5/3/22 but required reintubation that afternoon. Original intubation date 4/24/22. Now with trach/PEG 5/10/22    3. Acute left MCA infarct- mgmt per neuro. MRI 4/29/22 noted. Echo negative 4/30/22. On ASA. Supportive care    4. Hyperglycemia w/ Hx of DM II-mgmt per Hospitalist, SSI    5. S/p tonic-clonic seizure-mgmt per Neurology, cEEG discontinued, Keppra 500 mg BID    6. ESRD on HD-Nephrology managing. Dialysis today    7. Hypertension-Hydralazine 100 mg TID, cardene gtt as needed    8. Clostridium difficile/Norovirus- Oral vancomycin completed 5/8/22    9. MRSA/Raoultella ornithinolytica/Proteus vulgaris respiratory positivity- mgmt per hospitalist.    Thank you for consulting Palliative Care and allowing us to participate in the care of this patient.    Time Spent Counseling > 50%:  YES                                   Total Time Spent with patient/family counseling, workup/treatment review, counseling and placement of orders/preparation of this note: 27 minutes    Electronically signed by ROMINA Carrera CNP on 5/16/2022 at 8:02 AM    (Please note that portions of this note were completed with a voice recognition program.  Krissy Tinoco made to edit the dictations but occasionally words are mis-transcribed.)

## 2022-05-16 NOTE — CARE COORDINATION
Spoke with Salome Toledo at [facility] and denials to [facility].   Zara Carlson states that he will take to his supervisor for review and possibly expedite through the insurance an approval Electronically signed by Kerrie Will RN on 5/16/2022 at 2:22 PM

## 2022-05-16 NOTE — PROGRESS NOTES
[facility] Neurology Progress Note      Patient:   Rosalia Feng  MR#:    [id number]   Room:    [id number]   YOB: 1970  Date of Progress Note: 5/16/2022  Time of Note                           9:12 AM  Consulting Physician:  Reinaldo Nicole DO  Attending Physician:  Romelia Jackson MD      INTERVAL HISTORY:  No acute events, trach and peg in place, still localizing to pain, less withdrawal to pain over the the right side, remains unchanged. REVIEW OF SYSTEMS:  Unable to obtain     PHYSICAL EXAM:    Constitutional    BP (!) 173/70   Pulse 97   Temp 99.5 °F (37.5 °C) (Temporal)   Resp 19   Ht 5' 6\" (1.676 m)   Wt 134 lb (60.8 kg)   SpO2 100%   BMI 21.63 kg/m²   General appearance: Intubated, sedated   EYES -   Conjunctiva normal  Pupillary exam as below, see CN exam in the neurologic exam  ENT-    No scars, masses, or lesions over external nose or ears  Oropharynx - intubated  Cardiovascular -   No clubbing, cyanosis  Pulmonary-   Mechanically ventilated   Musculoskeletal    No significant wasting of muscles noted  Gait as below, see gait exam in the neurologic exam  Muscle strength, tone, stability as below see the motor exam in the neurologic exam.   No bony deformities  Skin    Warm, dry, and intact to inspection and palpation. No rash, erythema, or pallor        NEUROLOGICAL EXAM     Mental status    []? Awake, alert, oriented   []? Affect attention and concentration appear appropriate  []? Recent and remote memory appears unremarkable  []? Speech normal without dysarthria or aphasia, comprehension and repetition intact. COMMENTS:  Unresponsive to voice, not following commands, does appear to localize to pain    Cranial Nerves []? No VF deficit to confrontation,  optic discs normal, no papilledema on fundoscopic exam.  []? PERRLA, EOMI, no nystagmus, conjugate eye movements, no ptosis  []? Face symmetric  []? Facial sensation intact  []?  Tongue midline no atrophy or fasciculations present  []? Palate midline, hearing to finger rub normal  []? Shoulder shrug and SCM testing normal  COMMENTS:  PERRL, Face appears sym, cough/gag present, corneals present    Motor   []? 5/5 strength x 4 extremities  [x]? Normal bulk and tone  [x]? No tremor present  []? No rigidity or bradykinesia noted  COMMENTS: Withdrawal x 4, less over RUE   Sensory  []? Sensation intact to light touch, pin prick, vibration, and proprioception BLE  []? Sensation intact to light touch, pin prick, vibration, and proprioception BUE  COMMENTS: Limited    Coordination []? FTN normal bilaterally   []? HTS normal bilaterally  []? SANFORD normal.   COMMENTS: Limited    Reflexes  [x]? Symmetric and non-pathological  [x]? Toes downgoing bilaterally  [x]? No clonus present  COMMENTS:   Gait                  []? Normal steady gait    []? Ataxic    []? Spastic     []? Magnetic     []? Shuffling  [x]? Not assessed  COMMENTS:        LABS/IMAGING:    CT Head WO Contrast    Result Date: 4/24/2022  CT HEAD WO CONTRAST 4/24/2022 2:01 PM HISTORY: Altered mental status COMPARISON: CT scan dated 11/18/2021 DOSE LENGTH PRODUCT: 766 mGy cm TECHNIQUE: Helical tomographic images of the brain were obtained without the use of intravenous contrast. Automated exposure control was also utilized to decrease patient radiation dose. FINDINGS: There is no evidence of evolving large vascular territory infarct. No visualized intra-axial or extra-axial hemorrhage. No mass lesion is identified. Normal size and configuration of the ventricular system. The basal cisterns are symmetric. Posterior fossa structures are unremarkable. The included orbits and their contents are unremarkable. Chronic paranasal sinus disease on the left. The visualized osseous structures and overlying soft tissues of the skull and face are unremarkable. 1. No acute intracranial process.  Signed by Dr Ruiz Rodriguez    XR CHEST PORTABLE    Result Date: 4/24/2022  XR CHEST PORTABLE 4/24/2022 4:27 PM HISTORY: ET tube, enteric tube  Technique: Single AP view of the chest COMPARISONS: 4/24/2022 FINDINGS: Endotracheal tube is identified with tip essentially touching the krysta. Enteric tube courses into the stomach with tip curled in the fundus back towards the gastroesophageal junction. The lungs are clear and well expanded. Heart size is stable. Pulmonary vasculature are nondilated. No pleural effusion or pneumothorax. 1. Endotracheal tube is deep, tip essentially touching the krysta. Lungs are clear and well expanded. I would recommend 2-3 cm withdrawal for a more optimal positioning. The results of this exam were discussed with Samuel Flores (ICU nursing) on 4/24/2022 at 1640 hours Signed by Dr Jesus Restrepo    XR CHEST PORTABLE    Result Date: 4/24/2022  XR CHEST PORTABLE 4/24/2022 1:17 PM HISTORY: ET tube placement  Technique: Single AP view of the chest COMPARISONS: 4/24/2022 FINDINGS: Status post endotracheal intubation. ET tube is in good position. Lungs are well-expanded. Enteric tube curls on itself in the midesophagus and then extends back up into the throat. Heart size is stable. Pulmonary vasculature are nondilated. 1. ET tube is in good position. Lungs are clear and well expanded. 2. Enteric tube curls on itself in the mid esophagus before extending back up into the throat. This will need repositioned. The results of this exam were discussed with Dr. JUÁREZ [facility] on 4/24/2022 at 1329 hours Signed by Dr Jesus Restrepo    XR CHEST PORTABLE    Result Date: 4/24/2022  XR CHEST PORTABLE 4/24/2022 12:34 PM HISTORY: Altered mental status  Technique: Single AP view of the chest COMPARISONS: Chest exam dated 4/1/2022 FINDINGS: No lung consolidation. No pleural effusion or pneumothorax. Cardiomediastinal silhouette and pulmonary vasculature are unremarkable. No acute bony abnormality. Left subclavian region vascular stent with additional stent projecting over the left humerus.     No acute cardiopulmonary (cell phone).       Charlanne Bence, DO  Board Certified Neurology

## 2022-05-16 NOTE — PROGRESS NOTES
Pharmacy Aminoglycoside Consult    Aminoglycoside: Gentamicin  Day: 9  Current Dosing: Pulse dosing for HD    Temp max: 99.9    Estimated Creatinine Clearance: 14 mL/min (A) (based on SCr of 4.4 mg/dL (H)). Recent Labs     05/15/22  0154 05/16/22 0454   BUN 62* 89*       Recent Labs     05/15/22  0154 05/16/22 0454   CREATININE 3.5* 4.4*       Recent Labs     05/15/22  0154 05/16/22  0454   WBC 10.9* 11.5*       Culture Date Source Results   05/06/22 Respiratory MRSA; Raoultella ornithinolytica; Proteus vulgaris       Pre-HD level: 3.4    ASSESSMENT/PLAN: Give Gentamicin 3mg/kg x 1 dose today. Random level prior to next HD.     SHREE BARNES, PHARM D, 5/16/2022, 3:18 PM

## 2022-05-16 NOTE — PROGRESS NOTES
Patient again has begun evening shift with SBP > 160 upon shift arrival. SBP continues to rise throughout evening, see attached notation to hospitalist to request assistance:    Note to hospitalist regarding pt BP, via voalte:    pt in 149, vent/trached, here since 5/8. hx of HTN, stroke, awaiting LTACH placement. .. her BP in the evenings runs high. I gave her hydralazine 100 mg tablet @ 2100, and hydralazine prn 10mg iv push at 2230, as her SBP> 170. Her present BP @ 2330 is 192/74. In the mornings she receives imdur 120mg, metoprolol 100 mg, procardia 90 mg (all @ 0800) and gets 100mg hydralazine again @ 0600, and her bps have been excellent throughout the day. At 2329 her systolic was already 547. I only have prn hydralazine/10mg/q6 avbl again at 0430. I just retook her BP and at 2345 it's 188/74. It is possible to get lopressor prn, or something to address this issue? Thanks.    [de-identified] Received order for 10mg norvasc, note EMAR.

## 2022-05-16 NOTE — PROGRESS NOTES
Comprehensive Nutrition Assessment    Type and Reason for Visit:  Reassess    Nutrition Recommendations/Plan:   1. Continue EN to meet nutritional needs     Malnutrition Assessment:  Malnutrition Status:  Severe malnutrition (05/11/22 0915)      Nutrition Assessment:    Pt continues to tolerate EN. Propofol currently provides 95.04 non-protein kcals/day. Blood glucose levels are elevated. Vital High Protein with low CHO content. Pt on HD. K+ expected to decrease with tx. Continue current POC at this time. Nutrition Related Findings:    PEG, trach, dialysis Wound Type: Pressure Injury,Unstageable,Surgical Incision       Current Nutrition Intake & Therapies:    Average Meal Intake: NPO  Current Tube Feeding (TF) Orders:  · Feeding Route: PEG  · Formula: Peptide Based High Protein  · Schedule: Continuous  · Feeding Regimen: Vital High Protein goal rate 48ml/hr  · Current TF & Flush Orders Provides: Vital High Protein @ 48ml/hr =1152 kcals with 100g protein, 127g CHO and 963ml free water from formula  · Goal TF & Flush Orders Provides: Vital High Protein @ 48ml/hr =1152 kcals with 100g protein, 127g CHO and 963ml free water from formula      Anthropometric Measures:  Height: 5' 6\" (167.6 cm)  Ideal Body Weight (IBW): 130 lbs (59 kg)    Admission Body Weight: 165 lb (74.8 kg) (estimated)  Current Body Weight: 134 lb (60.8 kg)   Current BMI (kg/m2): 21.6  Usual Body Weight: 144 lb (65.3 kg) (1/2022)  % Weight Change (Calculated): 0.5                    BMI Categories: Normal Weight (BMI 18.5-24. 9)    Estimated Daily Nutrient Needs:  Energy Requirements Based On: Kcal/kg (20-25)  Weight Used for Energy Requirements: Current  Energy (kcal/day): 1184-7943 kcals/day  Weight Used for Protein Requirements: Current (1.5-2.0)  Protein (g/day):  g/protein/day  Method Used for Fluid Requirements: Other (Comment) (1000 mL + uop)  Fluid (ml/day): 1000 mL/day    Nutrition Diagnosis:   · Inadequate oral intake related to impaired respiratory function as evidenced by NPO or clear liquid status due to medical condition,intubation,nutrition support - enteral nutrition      Nutrition Interventions:   Food and/or Nutrient Delivery: Continue NPO,Continue Current Tube Feeding  Coordination of Nutrition Care: Continue to monitor while inpatient  Plan of Care discussed with: nursing    Goals:  Previous Goal Met: Progressing toward Goal(s)  Goals: Meet at least 75% of estimated needs,Tolerate nutrition support at goal rate       Nutrition Monitoring and Evaluation:   Food/Nutrient Intake Outcomes: Enteral Nutrition Intake/Tolerance  Physical Signs/Symptoms Outcomes: Biochemical Data,Fluid Status or Edema,Weight,Skin,Nutrition Focused Physical Findings    Discharge Planning:    Enteral Nutrition     Roberto Roberson, MS, RD, LD  Contact: [phone]

## 2022-05-16 NOTE — PROGRESS NOTES
Hospitalist Progress Note  [facility]     Patient: Kaylan Eubanks  : 1970  MRN: [id number]  Code Status: Full Code    Hospital Day:    Date of Service: 2022    Subjective:   Patient seen and examined. Trached. Restarting sedation as patient clearly requires it.     Past Medical History:   Diagnosis Date    Acute ischemic stroke ([facility] 2022    Arthritis     Chronic kidney disease, stage 5, kidney failure (HCC)     Closed fracture of right shoulder girdle, with routine healing, subsequent encounter     DM (diabetes mellitus) type II controlled with renal manifestation ([facility] 1993    Gastroparesis     GERD (gastroesophageal reflux disease)     Hemodialysis patient ([facility] [address] [facility]     dialysis on tues, thur, and sat at [facility]    Hypertension     Hypothyroid     Nasal congestion     recent    Neuropathy     Noncompliance with medications     Palliative care patient 10/08/2019    Restless leg syndrome        Past Surgical History:   Procedure Laterality Date    BREAST SURGERY Left     infected milk gland removed    BREAST SURGERY Right 2021    WEDGE EXCISION RIGHT BREAST DUCT WITH LACRIMAL DUCT PROBE, PEC BLOCK performed by Ludmila Dooley MD at [facility]          COLONOSCOPY Left 2020    Dr Mann Nguyen hepatic flexure-Severe tortuosity with severe spasming, 1 yr recall    DIALYSIS FISTULA CREATION Left 2019    REVISION LEFT UPPER EXTREMITY AV ACCESS WITH LEFT BRACHIAL ARTERY TO LEFT AXILLARY VEIN WITH  INTERPOSITIONAL ARTEGRAFT   performed by Zac Zaragoza MD at [address]      GASTROSTOMY TUBE PLACEMENT N/A 5/10/2022    PERCUTANEOUS ENDOSCOPIC GASTROSTOMY TUBE PLACEMENT, performed by Tequila Espinoza MD at [address] [place] [address] [address] Right 2019    INSERTION OF RIGHT INTERNAL JUGULAR HEMODIALYSIS CATHETER performed by Zac Zaragoza MD at [address] [facility]  2018 1.  Moderately increased stainable iron identified by special stain in Kupffer cells and occasional hepatocytes. Negative for evidence of significant portal or lobular inflammation. Negative for evidence of significant fibrosis    IL COLONOSCOPY W/BIOPSY SINGLE/MULTIPLE N/A 10/20/2017    Dr Eric García prep-Tubular AP (-) dysplasia x 2--3 yr recall    IL EGD TRANSORAL BIOPSY SINGLE/MULTIPLE N/A 2016    Dr Carmenza Romero EGD TRANSORAL BIOPSY SINGLE/MULTIPLE N/A 10/20/2017    Dr Joseph Rivera (-)    TRACHEOSTOMY  5/10/2022    TRACHEOSTOMY PERCUTANEOUS performed by Danisha Mukherjee MD at [address] VASCULAR SURGERY Left 2016    fistula by Dr Chester Mg at [address]  10/02/2017    [facility]. Left upper fistulograms/venograms, balloon angioplasty cephalic vein arch 33X58 conquest.    VASCULAR SURGERY  2018    FISTULOGRAM    VASCULAR SURGERY  10/29/2018    [facility]. Left upper fistulograms/venograms    VASCULAR SURGERY  2018    [facility]. Arch aortogram,left upper arteriograms.  VASCULAR SURGERY  2019    [facility]. Removal of tunneled dialyis catheter right internal jugular vein.  VASCULAR SURGERY  2019    [facility]. Left upper extremity fistulogram including venography to the superior vena cava. Balloon angioplasty left subclavian vein with 10mm x 40mm conquest balloon. Balloon angioplasty mid/proximal upper arm cephalic vein with 86CT x 40mm conquest balloon. Venograms after balloon angioplasty. Stent left mid/proximal upper arm cephalic vein stenosis fluency 10mm x 60mm self-expanding covered stent. Balloon     VASCULAR SURGERY  2019    cont angioplasty stent with 10mm x 40mm conquest balloon. Completion venograms left upper extremity.        Family History   Problem Relation Age of Onset    Colon Cancer Mother          at 63    Colon Polyps Mother     Lung Cancer Father          at 66    Colon Polyps Father     Stomach Cancer Sister     Breast Cancer Sister 27    Depression Daughter 25        committed suicide    Liver Cancer Neg Hx     Liver Disease Neg Hx     Esophageal Cancer Neg Hx     Rectal Cancer Neg Hx        Social History     Socioeconomic History    Marital status: Single     Spouse name: Not on file    Number of children: 2    Years of education: Not on file    Highest education level: Not on file   Occupational History    Not on file   Tobacco Use    Smoking status: Current Every Day Smoker     Packs/day: 0.50     Years: 33.00     Pack years: 16.50     Types: Cigarettes    Smokeless tobacco: Never Used    Tobacco comment: NOW at 1/4 PD, started at age 24 and has averaged 2 PPD   Vaping Use    Vaping Use: Never used   Substance and Sexual Activity    Alcohol use: No    Drug use: Not Currently     Types: Marijuana [place] Su)    Sexual activity: Not Currently     Partners: Male   Other Topics Concern    Not on file   Social History Narrative    Not on file     Social Determinants of Health     Financial Resource Strain:     Difficulty of Paying Living Expenses: Not on file   Food Insecurity:     Worried About Running Out of Food in the Last Year: Not on file    Kendy of Food in the Last Year: Not on file   Transportation Needs:     Lack of Transportation (Medical): Not on file    Lack of Transportation (Non-Medical):  Not on file   Physical Activity:     Days of Exercise per Week: Not on file    Minutes of Exercise per Session: Not on file   Stress:     Feeling of Stress : Not on file   Social Connections:     Frequency of Communication with Friends and Family: Not on file    Frequency of Social Gatherings with Friends and Family: Not on file    Attends [employer] Services: Not on file    Active Member of Clubs or Organizations: Not on file    Attends Club or Organization Meetings: Not on file    Marital Status: Not on file   Intimate Partner Violence:     Fear of Current or Ex-Partner: Not on file    Emotionally Abused: Not on file    Physically Abused: Not on file    Sexually Abused: Not on file   Housing Stability:     Unable to Pay for Housing in the Last Year: Not on file    Number of Places Lived in the Last Year: Not on file    Unstable Housing in the Last Year: Not on file       Current Facility-Administered Medications   Medication Dose Route Frequency Provider Last Rate Last Admin    sodium zirconium cyclosilicate (LOKELMA) oral suspension 10 g  10 g Oral TID Patience Walter, DO        0.9 % sodium chloride infusion   IntraVENous PRN Patience Walter, DO        amLODIPine (NORVASC) tablet 10 mg  10 mg Oral Daily Patience Walter, DO   10 mg at 05/16/22 0002    insulin lispro (HUMALOG) injection vial 0-6 Units  0-6 Units SubCUTAneous TID  Kimberly Espitia MD   5 Units at 05/16/22 0825    insulin lispro (HUMALOG) injection vial 0-3 Units  0-3 Units SubCUTAneous Nightly Kimberly Espitia MD   1 Units at 05/15/22 2114    levETIRAcetam (KEPPRA) 100 MG/ML solution 500 mg  500 mg Per G Tube BID Pancho Valero MD   500 mg at 05/15/22 2114    atorvastatin (LIPITOR) tablet 20 mg  20 mg Oral Daily Kimberly Espitai MD   20 mg at 05/15/22 0741    sevelamer (RENVELA) tablet 800 mg  800 mg Oral TID  Kimberly Espitia MD   800 mg at 05/14/22 0820    rOPINIRole (REQUIP) tablet 4 mg  4 mg Oral Nightly Kimberly Espitia MD   4 mg at 05/15/22 2114    NIFEdipine (PROCARDIA XL) extended release tablet 90 mg  90 mg Oral Daily Kimberly Espitia MD   90 mg at 05/15/22 0741    metoprolol succinate (TOPROL XL) extended release tablet 100 mg  100 mg Oral QAM  Kimberly Espitia MD   100 mg at 05/15/22 0741    levothyroxine (SYNTHROID) tablet 150 mcg  150 mcg Oral Daily Kimberly Espitia MD   150 mcg at 05/15/22 0741    isosorbide mononitrate (IMDUR) extended release tablet 120 mg  120 mg Oral Daily Kimberly Espitia MD   120 mg at 05/15/22 0741    DULoxetine (CYMBALTA) extended release capsule 60 mg  60 mg Oral Daily Kimberly Espitia MD   60 mg at 05/16/22 0825    Peppermint Spirit SPRT   Does not apply PRN Emily Wallace MD        menthol-zinc oxide (CALMOSEPTINE) 0.44-20.6 % ointment   Topical BID Gunner Swanson MD   Given at 05/16/22 7529    sodium phosphate 10.5 mmol in sodium chloride 0.9 % 250 mL IVPB  0.16 mmol/kg IntraVENous PRN Emily Wallace MD        Or    sodium phosphate 21 mmol in sodium chloride 0.9 % 250 mL IVPB  0.32 mmol/kg IntraVENous PRN Emily Wallace MD        hydrALAZINE (APRESOLINE) tablet 100 mg  100 mg Oral 3 times per day Emily Wallace MD   100 mg at 05/16/22 0511    glucagon (rDNA) injection 1 mg  1 mg IntraMUSCular PRN Emily Wallace MD        dextrose 5 % solution  100 mL/hr IntraVENous PRN Emily Wallace MD        glucose chewable tablet 16 g  4 tablet Oral PRN Emily Wallace MD        dextrose bolus (hypoglycemia) 10% 125 mL  125 mL IntraVENous PRN Emily Wallace MD   Stopped at 05/11/22 2016    Or    dextrose bolus (hypoglycemia) 10% 250 mL  250 mL IntraVENous PRN Emily Wallace MD   Stopped at 05/10/22 2026    LORazepam (ATIVAN) injection 1 mg  1 mg IntraVENous Q5 Min PRN Emily Wallace MD        ondansetron (ZOFRAN-ODT) disintegrating tablet 4 mg  4 mg Oral Q8H PRN Emily Wallace MD        Or    ondansetron [facility]) injection 4 mg  4 mg IntraVENous Q6H PRN Emily Wallace MD        polyethylene glycol [facility]) packet 17 g  17 g Oral Daily PRN Emily Wallace MD        acetaminophen (TYLENOL) tablet 650 mg  650 mg Oral Q6H PRN Emily Wallace MD   650 mg at 04/25/22 0035    Or    acetaminophen (TYLENOL) suppository 650 mg  650 mg Rectal Q6H PRDAVI Wallace MD        chlorhexidine (PERIDEX) 0.12 % solution 15 mL  15 mL Mouth/Throat BID Emily Wallace MD   15 mL at 05/15/22 2124    famotidine (PEPCID) 20 mg in sodium chloride (PF) 10 mL injection  20 mg IntraVENous Daily Emily Wallace MD   20 mg at 05/15/22 0741    magic butt cream   Topical Q4H PRN Emily Wallace MD   Given at 05/03/22 0940    magnesium sulfate 1000 mg in dextrose 5% 100 mL IVPB  1,000 mg IntraVENous PRN Bo Feliz MD        potassium bicarb-citric acid (EFFER-K) effervescent tablet 40 mEq  40 mEq Oral PRN Bo Feliz MD        Or    potassium chloride 10 mEq/100 mL IVPB (Peripheral Line)  10 mEq IntraVENous PRN Bo Feliz MD   Stopped at 04/25/22 0936         sodium chloride      sodium chloride      propofol 10 mcg/kg/min (05/16/22 0900)    dextrose          Objective:   BP (!) 173/70   Pulse 97   Temp 99.5 °F (37.5 °C) (Temporal)   Resp 19   Ht 5' 6\" (1.676 m)   Wt 134 lb (60.8 kg)   SpO2 100%   BMI 21.63 kg/m²     General: no acute distress  HEENT: normocephalic, atraumatic  Lungs: faint rhonchi  Cardiovascular: s1 and s2 normal  Abdomen: soft, positive bowel sounds  Extremities: no cyanosis   Neuro: trached    Recent Labs     05/14/22  0137 05/14/22  0137 05/15/22  0154 05/15/22  0906 05/16/22 0454   WBC 9.1  --  10.9*  --  11.5*   RBC 2.69*  --  2.37*  --  2.82*   HGB 7.6*   < > 6.7* 8.2* 8.1*   HCT 25.5*   < > 22.2* 27.7* 27.2*   MCV 94.8  --  93.7  --  96.5   MCH 28.3  --  28.3  --  28.7   MCHC 29.8*  --  30.2*  --  29.8*     --  278  --  329    < > = values in this interval not displayed. Recent Labs     05/14/22  0137 05/15/22  0154 05/16/22  0454    130* 130*   K 4.6 5.5* 6.7*   ANIONGAP 16 20* 26*   CL 94* 89* 85*   CO2 27 21* 19*   BUN 43* 62* 89*   CREATININE 2.4* 3.5* 4.4*   GLUCOSE 90 230* 303*   CALCIUM 9.0 9.3 9.3     Recent Labs     05/14/22  0137 05/15/22  0154 05/16/22  0454   MG 2.3 2.5 2.7*   PHOS 5.7* 7.5* 8.8*     Recent Labs     05/14/22  0137 05/15/22  0154 05/16/22  0454   AST 15 13 13   ALT 8 7 8   BILITOT 0.3 0.3 0.4   ALKPHOS 104 98 114*     No results for input(s): PH, PO2, PCO2, HCO3, BE, O2SAT in the last 72 hours. No results for input(s): TROPONINI in the last 72 hours.   Recent Labs     05/14/22  0137 05/15/22  0154 05/16/22  0454   INR 1.14 1.14 1.19*     No results for input(s): LACTA in the last 72 hours. Intake/Output Summary (Last 24 hours) at 5/16/2022 0940  Last data filed at 5/16/2022 0900  Gross per 24 hour   Intake 2001.17 ml   Output 0 ml   Net 2001.17 ml       No results found.      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 April Gong sedation as patient clearly requires it  Discussed with staff     ESRD on HD  Renal following   Renal aware of K 6.7  Ongoing HD during my examination  Follow post HD K     Medical noncompliance       C. difficile infection  Completed oral vancomycin course     Tobacco dependence       IDDM 2  Caution bouts of hypoglycemia     DVT prophylaxis  Heparin    Total critical care time: 40 minutes    Aide Gee MD   5/16/2022 9:40 AM

## 2022-05-16 NOTE — PROGRESS NOTES
Nephrology ([address] [facility]) Progress Note    Patient:  Dillan Baez  YOB: 1970  Date of Service: 5/16/2022  MRN: [id number]   Acct: [de-identified]   Primary Care Physician: ROMINA Shoemaker  Advance Directive: Full Code  Admit Date: 4/24/2022       Hospital Day: 25  Referring Provider: Lou Sanchez MD    Patient independently seen and examined, Chart, Consults, Notes, Operative notes, Labs, Cardiology, and Radiology studies reviewed as available. Chief complaint: Altered mental status/end-stage renal disease    Subjective:  Dillan Baez is a 47 y.o. female for whom we were consulted for evaluation and treatment of end-stage renal disease. Patient also has history of seizure disorder, type 2 diabetes, hypertension, frequent hospitalization with noncompliance and poorly controlled diabetes. Patient was brought to the emergency room after she was found to have unresponsiveness and witnessed grand mal seizure. She was brought in by ambulance. On arrival in the emergency room she was found to be severely hyperglycemic and had a urinary tract infection. Patient was intubated, sedated and currently being treated by neurology for grand mal seizure. She is also receiving IV antibiotics and insulin drip. Her blood sugar control has significantly improved. On April 25, she has received emergent hemodialysis treatment for correction of volume status as well as hyponatremia. She is now moved from ICU to CCU. Recent MRI of brain showed Ischemic brain infarct. She is now s/p tach and PEG. She is currtly waiting to go to [facility]    Currently seen on HD   HD access. AVF   HD; 3 1/2 hours   UF 3000 cc    cc per minute    cc per min  . Anton Valles     Allergies:  Penicillins, Lamisil advanced [terbinafine], Reglan [metoclopramide], and Stadol [butorphanol]    Medicines:  Current Facility-Administered Medications   Medication Dose Route Frequency Provider Last Rate Last Admin    sodium zirconium cyclosilicate (LOKELMA) oral suspension 10 g  10 g Oral TID Patience Walter, DO        0.9 % sodium chloride infusion   IntraVENous PRN Patience Walter, DO        amLODIPine (NORVASC) tablet 10 mg  10 mg Oral Daily Patience Walter, DO   10 mg at 05/16/22 0002    insulin lispro (HUMALOG) injection vial 0-6 Units  0-6 Units SubCUTAneous TID  Kim Bobby MD   5 Units at 05/16/22 0825    insulin lispro (HUMALOG) injection vial 0-3 Units  0-3 Units SubCUTAneous Nightly Kim Bobby MD   1 Units at 05/15/22 2114    levETIRAcetam (KEPPRA) 100 MG/ML solution 500 mg  500 mg Per G Tube BID Reddy Honeycutt MD   500 mg at 05/15/22 2114    atorvastatin (LIPITOR) tablet 20 mg  20 mg Oral Daily Kim Bobby MD   20 mg at 05/15/22 0741    sevelamer (RENVELA) tablet 800 mg  800 mg Oral TID  Kim Bobby MD   800 mg at 05/14/22 0820    rOPINIRole (REQUIP) tablet 4 mg  4 mg Oral Nightly Kim Bobby MD   4 mg at 05/15/22 2114    NIFEdipine (PROCARDIA XL) extended release tablet 90 mg  90 mg Oral Daily Kim Bobby MD   90 mg at 05/15/22 0741    metoprolol succinate (TOPROL XL) extended release tablet 100 mg  100 mg Oral QAM AC Kim Bobby MD   100 mg at 05/15/22 0741    levothyroxine (SYNTHROID) tablet 150 mcg  150 mcg Oral Daily Kim Bobby MD   150 mcg at 05/15/22 0741    isosorbide mononitrate (IMDUR) extended release tablet 120 mg  120 mg Oral Daily Kim Bobby MD   120 mg at 05/15/22 0741    DULoxetine (CYMBALTA) extended release capsule 60 mg  60 mg Oral Daily Kim Bobby MD   60 mg at 05/15/22 0741    collagenase ointment   Topical Daily Kim Bobby MD   Given at 05/15/22 0743    sodium chloride flush 0.9 % injection 5-40 mL  5-40 mL IntraVENous 2 times per day Otilio Coats MD   10 mL at 05/16/22 0826    sodium chloride flush 0.9 % injection 5-40 mL  5-40 mL IntraVENous PRN Otilio Coats MD        0.9 % sodium chloride infusion   IntraVENous PRN Ginger Fisher Altagracia Appiah MD        HYDROmorphone HCl PF (DILAUDID) injection 0.25 mg  0.25 mg IntraVENous Q5 Min PRN Ellery Schwab, MD        HYDROmorphone HCl PF (DILAUDID) injection 0.5 mg  0.5 mg IntraVENous Q5 Min PRN Ellery Schwab, MD        vancomycin Jennifer Rui) intermittent dosing (placeholder)   Other RX Bisi Carlos MD        aminoglycoside intermittent dosing (placeholder)   Other RX Bisi Carlos MD        acetylcysteine (MUCOMYST) 20 % solution 600 mg  600 mg Inhalation BID Chris Joe MD   600 mg at 05/16/22 1890    ipratropium-albuterol (DUONEB) nebulizer solution 1 ampule  1 ampule Inhalation Q4H Chris Joe MD   1 ampule at 05/16/22 3641    [Held by provider] insulin glargine (LANTUS) injection vial 20 Units  20 Units SubCUTAneous BID Chris Joe MD   20 Units at 05/13/22 2050    hydrALAZINE (APRESOLINE) injection 10 mg  10 mg IntraVENous Q6H PRN Chris Joe MD   10 mg at 05/15/22 2238    aspirin chewable tablet 81 mg  81 mg Oral Daily Chris Joe MD   81 mg at 05/15/22 0741    racepinephrine HCl (VAPONEFPRIN) 2.25 % nebulizer solution NEBU 11.25 mg  11.25 mg Nebulization Q4H PRN Chris Joe MD   11.25 mg at 05/03/22 1408    sodium chloride nebulizer 0.9 % solution 3 mL  3 mL Nebulization Q4H PRN Chris Joe MD        propofol injection  5-50 mcg/kg/min IntraVENous Continuous Chris Joe MD 3.6 mL/hr at 05/16/22 0900 10 mcg/kg/min at 05/16/22 0900    heparin (porcine) injection 5,000 Units  5,000 Units SubCUTAneous TID Chris Joe MD   5,000 Units at 05/16/22 1000 Mily Irene   Does not apply PRN Chris Joe MD        menthol-zinc oxide (CALMOSEPTINE) 0.44-20.6 % ointment   Topical BID Keith Baker MD   Given at 05/16/22 1713    sodium phosphate 10.5 mmol in sodium chloride 0.9 % 250 mL IVPB  0.16 mmol/kg IntraVENous PRN Chris Joe MD        Or    sodium phosphate 21 mmol in sodium chloride 0.9 % 250 mL IVPB 0.32 mmol/kg IntraVENous PRN Chris Joe MD        hydrALAZINE (APRESOLINE) tablet 100 mg  100 mg Oral 3 times per day Chris MD Heath   100 mg at 05/16/22 0511    glucagon (rDNA) injection 1 mg  1 mg IntraMUSCular PRN Chris Joe MD        dextrose 5 % solution  100 mL/hr IntraVENous PRN Chris Joe MD        glucose chewable tablet 16 g  4 tablet Oral PRN Chris Joe MD        dextrose bolus (hypoglycemia) 10% 125 mL  125 mL IntraVENous PRN Chris Joe MD   Stopped at 05/11/22 2016    Or    dextrose bolus (hypoglycemia) 10% 250 mL  250 mL IntraVENous PRN Chris Joe MD   Stopped at 05/10/22 2026    LORazepam (ATIVAN) injection 1 mg  1 mg IntraVENous Q5 Min PRN Chris Joe MD        ondansetron (ZOFRAN-ODT) disintegrating tablet 4 mg  4 mg Oral Q8H PRN Chris Joe MD        Or    ondansetron [facility] injection 4 mg  4 mg IntraVENous Q6H PRN Chris Joe MD        polyethylene glycol [facility]) packet 17 g  17 g Oral Daily PRN Chris Joe MD        acetaminophen (TYLENOL) tablet 650 mg  650 mg Oral Q6H PRN Chris Joe MD   650 mg at 04/25/22 0035    Or    acetaminophen (TYLENOL) suppository 650 mg  650 mg Rectal Q6H PRN Chris Joe MD        chlorhexidine (PERIDEX) 0.12 % solution 15 mL  15 mL Mouth/Throat BID Chris Joe MD   15 mL at 05/15/22 2124    famotidine (PEPCID) 20 mg in sodium chloride (PF) 10 mL injection  20 mg IntraVENous Daily Chris Joe MD   20 mg at 05/15/22 0741    magic butt cream   Topical Q4H PRN Chris Joe MD   Given at 05/03/22 0940    magnesium sulfate 1000 mg in dextrose 5% 100 mL IVPB  1,000 mg IntraVENous PRN Chris Joe MD        potassium bicarb-citric acid (EFFER-K) effervescent tablet 40 mEq  40 mEq Oral PRN Chris Joe MD        Or    potassium chloride 10 mEq/100 mL IVPB (Peripheral Line)  10 mEq IntraVENous PRN Chris Joe MD   Stopped at 04/25/22 1508       Past Medical History:  Past Medical History:   Diagnosis Date    Acute ischemic stroke ([address] 4/30/2022    Arthritis     Chronic kidney disease, stage 5, kidney failure (HCC)     Closed fracture of right shoulder girdle, with routine healing, subsequent encounter     DM (diabetes mellitus) type II controlled with renal manifestation ([address] 06/1993    Gastroparesis     GERD (gastroesophageal reflux disease)     Hemodialysis patient ([address]     dialysis on tues, thur, and sat at [facility]    Hypertension     Hypothyroid     Nasal congestion     recent    Neuropathy     Noncompliance with medications     Palliative care patient 10/08/2019    Restless leg syndrome        Past Surgical History:  Past Surgical History:   Procedure Laterality Date    BREAST SURGERY Left 2008    infected milk gland removed    BREAST SURGERY Right 5/25/2021    WEDGE EXCISION RIGHT BREAST DUCT WITH LACRIMAL DUCT PROBE, PEC BLOCK performed by Candi Felton MD at [facility]      2008    COLONOSCOPY Left 9/17/2020    Dr Tiffanie Bolivar hepatic flexure-Severe tortuosity with severe spasming, 1 yr recall    DIALYSIS FISTULA CREATION Left 1/25/2019    REVISION LEFT UPPER EXTREMITY AV ACCESS WITH LEFT BRACHIAL ARTERY TO LEFT AXILLARY VEIN WITH  INTERPOSITIONAL ARTEGRAFT   performed by Av Meraz MD at [address]  2012    GASTROSTOMY TUBE PLACEMENT N/A 5/10/2022    PERCUTANEOUS ENDOSCOPIC GASTROSTOMY TUBE PLACEMENT, performed by Annalee Rodriguez MD at [address] Right 1/25/2019    INSERTION OF RIGHT INTERNAL JUGULAR HEMODIALYSIS CATHETER performed by Av Meraz MD at [address]  08/17/2018    1.  Moderately increased stainable iron identified by special stain in Kupffer cells and occasional hepatocytes. Negative for evidence of significant portal or lobular inflammation.  Negative for evidence of significant fibrosis    IA COLONOSCOPY W/BIOPSY SINGLE/MULTIPLE N/A 10/20/2017    Dr Dale Flores prep-Tubular AP (-) dysplasia x 2--3 yr recall    TX EGD TRANSORAL BIOPSY SINGLE/MULTIPLE N/A 2016    Dr Karl Smith EGD TRANSORAL BIOPSY SINGLE/MULTIPLE N/A 10/20/2017    Dr Roderick Ritchie (-)    TRACHEOSTOMY  5/10/2022    TRACHEOSTOMY PERCUTANEOUS performed by Chris Joe MD at [address] VASCULAR SURGERY Left 2016    fistula by Dr Raquel De Luna at [address]  10/02/2017    [facility]. Left upper fistulograms/venograms, balloon angioplasty cephalic vein arch [id number] conquest.    VASCULAR SURGERY  2018    FISTULOGRAM    VASCULAR SURGERY  10/29/2018    [facility]. Left upper fistulograms/venograms    VASCULAR SURGERY  2018    [facility]. Arch aortogram,left upper arteriograms.  VASCULAR SURGERY  2019    [facility]. Removal of tunneled dialyis catheter right internal jugular vein.  VASCULAR SURGERY  2019    [facility]. Left upper extremity fistulogram including venography to the superior vena cava. Balloon angioplasty left subclavian vein with 10mm x 40mm conquest balloon. Balloon angioplasty mid/proximal upper arm cephalic vein with 85HT x 40mm conquest balloon. Venograms after balloon angioplasty. Stent left mid/proximal upper arm cephalic vein stenosis fluency 10mm x 60mm self-expanding covered stent. Balloon     VASCULAR SURGERY  2019    cont angioplasty stent with 10mm x 40mm conquest balloon. Completion venograms left upper extremity.        Family History  Family History   Problem Relation Age of Onset    Colon Cancer Mother          at 63    Colon Polyps Mother     Lung Cancer Father          at 79    Colon Polyps Father     Stomach Cancer Sister     Breast Cancer Sister 27    Depression Daughter 25        committed suicide    Liver Cancer Neg Hx     Liver Disease Neg Hx     Esophageal Cancer Neg Hx     Rectal Cancer Neg Hx        Social History  Social History     Socioeconomic History    Marital status: Single Spouse name: Not on file    Number of children: 2    Years of education: Not on file    Highest education level: Not on file   Occupational History    Not on file   Tobacco Use    Smoking status: Current Every Day Smoker     Packs/day: 0.50     Years: 33.00     Pack years: 16.50     Types: Cigarettes    Smokeless tobacco: Never Used    Tobacco comment: NOW at 1/4 PD, started at age 24 and has averaged 2 PPD   Vaping Use    Vaping Use: Never used   Substance and Sexual Activity    Alcohol use: No    Drug use: Not Currently     Types: Marijuana Fern Mimi)    Sexual activity: Not Currently     Partners: Male   Other Topics Concern    Not on file   Social History Narrative    Not on file     Social Determinants of Health     Financial Resource Strain:     Difficulty of Paying Living Expenses: Not on file   Food Insecurity:     Worried About Running Out of Food in the Last Year: Not on file    Kendy of Food in the Last Year: Not on file   Transportation Needs:     Lack of Transportation (Medical): Not on file    Lack of Transportation (Non-Medical):  Not on file   Physical Activity:     Days of Exercise per Week: Not on file    Minutes of Exercise per Session: Not on file   Stress:     Feeling of Stress : Not on file   Social Connections:     Frequency of Communication with Friends and Family: Not on file    Frequency of Social Gatherings with Friends and Family: Not on file    Attends [employer] Services: Not on file    Active Member of Clubs or Organizations: Not on file    Attends Club or Organization Meetings: Not on file    Marital Status: Not on file   Intimate Partner Violence:     Fear of Current or Ex-Partner: Not on file    Emotionally Abused: Not on file    Physically Abused: Not on file    Sexually Abused: Not on file   Housing Stability:     Unable to Pay for Housing in the Last Year: Not on file    Number of [place] in the Last Year: Not on file    Unstable Housing in the Last Year: Not on file         Review of Systems:  Review of system is unobtainable as she is intubated and sedated.}       Objective:  Patient Vitals for the past 24 hrs:   BP Temp Temp src Pulse Resp SpO2 Height Weight   05/16/22 0800 (!) 173/70 99.5 °F (37.5 °C) Temporal 97 19 100 %     05/16/22 0700 (!) 186/70   97 16 100 %     05/16/22 0628    93 15 99 %     05/16/22 0600 (!) 156/64   92 17 98 %     05/16/22 0532        134 lb (60.8 kg)   05/16/22 0500 (!) 157/59   92 15 98 %     05/16/22 0400 (!) 164/63 99.2 °F (37.3 °C) Temporal 93 19 97 % 5' 6\" (1.676 m) 134 lb (60.8 kg)   05/16/22 0300 (!) 180/66   92 17 99 %     05/16/22 0200 (!) 163/72   91 14 100 %     05/16/22 0156    92 17 99 %     05/16/22 0100 (!) 173/70   92 15 98 %     05/16/22 0002 (!) 174/74          05/16/22 0000 (!) 174/74 98.5 °F (36.9 °C) Temporal 88 14 98 %     05/15/22 2238 (!) 168/68          05/15/22 2200 (!) 168/68   84 14 100 %     05/15/22 2155    84 15 100 %     05/15/22 2100 (!) 163/68   85 15 97 %     05/15/22 2000 (!) 160/66 97.7 °F (36.5 °C) Temporal 86 17 97 %     05/15/22 1900 (!) 160/66   88 17 97 %     05/15/22 1803     15 97 %     05/15/22 1800 (!) 159/67   90 16 97 %     05/15/22 1750    90 18 96 %     05/15/22 1700 (!) 144/63   89 26 96 %     05/15/22 1600 127/61 98.5 °F (36.9 °C) Temporal 87 14 96 %     05/15/22 1500 (!) 115/56   87 14 95 %     05/15/22 1414    88 (!) 33 94 %     05/15/22 1400 (!) 112/56   88 14 95 %     05/15/22 1300 (!) 118/55   96 14 95 %     05/15/22 1200 (!) 108/57 100.1 °F (37.8 °C) Temporal 95 14 95 %     05/15/22 1100 (!) 114/57   97 14 95 %     05/15/22 1025     14 96 %         Intake/Output Summary (Last 24 hours) at 5/16/2022 1002  Last data filed at 5/16/2022 0900  Gross per 24 hour   Intake 1905.17 ml   Output 0 ml   Net 1905.17 ml     General: Intubated/not sedated  HEENT: Normocephalic atraumatic head/orotracheal intubation  Neck: Supple without JVD or carotid bruits  Chest: Bilateral clear breath sounds. CVS: Regular rate and rhythm S1 and S2. Abdominal: Soft and nondistended positive bowel sounds. Extremities: No pedal edema. Skin: warm and dry without rash    Labs:  BMP:   Recent Labs     05/14/22  0137 05/15/22  0154 05/16/22  0454    130* 130*   K 4.6 5.5* 6.7*   CL 94* 89* 85*   CO2 27 21* 19*   PHOS 5.7* 7.5* 8.8*   BUN 43* 62* 89*   CREATININE 2.4* 3.5* 4.4*   CALCIUM 9.0 9.3 9.3     CBC:   Recent Labs     05/14/22  0137 05/14/22  0137 05/15/22  0154 05/15/22  0906 05/16/22  0454   WBC 9.1  --  10.9*  --  11.5*   HGB 7.6*   < > 6.7* 8.2* 8.1*   HCT 25.5*   < > 22.2* 27.7* 27.2*   MCV 94.8  --  93.7  --  96.5     --  278  --  329    < > = values in this interval not displayed. LIVER PROFILE:   Recent Labs     05/14/22  0137 05/15/22  0154 05/16/22  0454   AST 15 13 13   ALT 8 7 8   BILITOT 0.3 0.3 0.4   ALKPHOS 104 98 114*     PT/INR:   Recent Labs     05/14/22  0137 05/15/22  0154 05/16/22  0454   PROTIME 14.6 14.6 15.1*   INR 1.14 1.14 1.19*     APTT: No results for input(s): APTT in the last 72 hours. BNP:  No results for input(s): BNP in the last 72 hours. Ionized Calcium:No results for input(s): IONCA in the last 72 hours. Magnesium:  Recent Labs     05/14/22  0137 05/15/22  0154 05/16/22  0454   MG 2.3 2.5 2.7*     Phosphorus:  Recent Labs     05/14/22  0137 05/15/22  0154 05/16/22 0454   PHOS 5.7* 7.5* 8.8*     HgbA1C:   No results for input(s): LABA1C in the last 72 hours. Hepatic:   Recent Labs     05/14/22  0137 05/15/22  0154 05/16/22  0454   ALKPHOS 104 98 114*   ALT 8 7 8   AST 15 13 13   PROT 6.1* 6.2* 6.0*   BILITOT 0.3 0.3 0.4   LABALBU 3.0* 2.6* 2.9*     Lactic Acid: No results for input(s): LACTA in the last 72 hours. Troponin: No results for input(s): CKTOTAL, CKMB, TROPONINT in the last 72 hours.   ABGs:   Lab Results   Component Value Date    PHART 7.540 05/04/2022    PO2ART 119.0 05/04/2022    [id number] 36.0 05/04/2022     CRP:  No results for input(s): CRP in the last 72 hours. Sed Rate:  No results for input(s): SEDRATE in the last 72 hours. Culture Results:   Blood Culture Recent:   Recent Labs     04/24/22  1447   BC No growth after 5 days of incubation. Urine Culture Recent :   Recent Labs     04/24/22  1354   LABURIN No growth at 36-48 hours       Radiology reports as per the Radiologist  Radiology: CT Head WO Contrast    Result Date: 4/24/2022  CT HEAD WO CONTRAST 4/24/2022 2:01 PM HISTORY: Altered mental status COMPARISON: CT scan dated 11/18/2021 DOSE LENGTH PRODUCT: 766 mGy cm TECHNIQUE: Helical tomographic images of the brain were obtained without the use of intravenous contrast. Automated exposure control was also utilized to decrease patient radiation dose. FINDINGS: There is no evidence of evolving large vascular territory infarct. No visualized intra-axial or extra-axial hemorrhage. No mass lesion is identified. Normal size and configuration of the ventricular system. The basal cisterns are symmetric. Posterior fossa structures are unremarkable. The included orbits and their contents are unremarkable. Chronic paranasal sinus disease on the left. The visualized osseous structures and overlying soft tissues of the skull and face are unremarkable. 1. No acute intracranial process. Signed by Dr Jose Francois    Result Date: 4/24/2022  XR CHEST PORTABLE 4/24/2022 4:27 PM HISTORY: ET tube, enteric tube  Technique: Single AP view of the chest COMPARISONS: 4/24/2022 FINDINGS: Endotracheal tube is identified with tip essentially touching the krysta. Enteric tube courses into the stomach with tip curled in the fundus back towards the gastroesophageal junction. The lungs are clear and well expanded. Heart size is stable. Pulmonary vasculature are nondilated.  No pleural effusion or pneumothorax. 1. Endotracheal tube is deep, tip essentially touching the krysta. Lungs are clear and well expanded. I would recommend 2-3 cm withdrawal for a more optimal positioning. The results of this exam were discussed with [facility] (ICU nursing) on 4/24/2022 at 1640 hours Signed by Dr Juju Fierro    XR CHEST PORTABLE    Result Date: 4/24/2022  XR CHEST PORTABLE 4/24/2022 1:17 PM HISTORY: ET tube placement  Technique: Single AP view of the chest COMPARISONS: 4/24/2022 FINDINGS: Status post endotracheal intubation. ET tube is in good position. Lungs are well-expanded. Enteric tube curls on itself in the midesophagus and then extends back up into the throat. Heart size is stable. Pulmonary vasculature are nondilated. 1. ET tube is in good position. Lungs are clear and well expanded. 2. Enteric tube curls on itself in the mid esophagus before extending back up into the throat. This will need repositioned. The results of this exam were discussed with Dr. JUÁREZ [facility] on 4/24/2022 at 1329 hours Signed by Dr Juju Fierro    XR CHEST PORTABLE    Result Date: 4/24/2022  XR CHEST PORTABLE 4/24/2022 12:34 PM HISTORY: Altered mental status  Technique: Single AP view of the chest COMPARISONS: Chest exam dated 4/1/2022 FINDINGS: No lung consolidation. No pleural effusion or pneumothorax. Cardiomediastinal silhouette and pulmonary vasculature are unremarkable. No acute bony abnormality. Left subclavian region vascular stent with additional stent projecting over the left humerus. No acute cardiopulmonary process. Signed by Dr Greg Burns    Result Date: 4/1/2022  XR CHEST PORTABLE 4/1/2022 12:35 PM HISTORY: Shortness of breath  Technique: Single AP view of the chest COMPARISONS: Chest exam dated 1/22/2022 FINDINGS: Cardiomegaly with central pulmonary vascular congestion, interstitial and alveolar edema as well as bilateral moderate layering pleural effusions. No pneumothorax.  No acute bony abnormality. 1. Volume overload with interstitial and carie pulmonary edema as well as bilateral moderate pleural effusions. Signed by Dr Dwayne Oropeza   1. End-stage renal disease/currently seen on HD   2. Type II diabetic nephropathy. 3.  Acute respiratory failure/intubated. 4.  Status post grand mal seizure  5. Severe hyponatremia due to volume overload. 6.  Anemia of chronic kidney disease. 7.  Urinary tract infection. 8.  Pulmonary edema  9. Hypokalemia  10. Acute ischemic brain infarct. Plan:  1. Tolerating HD very well  2.  Plan was discussed with Nephrology nurse       Petrona Talavera MD  05/16/22  10:02 AM

## 2022-05-16 NOTE — PLAN OF CARE
Nutrition Problem #1: Inadequate oral intake  Intervention: Food and/or Nutrient Delivery: Continue NPO,Continue Current Tube Feeding

## 2022-05-17 LAB
ALBUMIN SERPL-MCNC: 2.8 G/DL (ref 3.5–5.2)
ALP BLD-CCNC: 109 U/L (ref 35–104)
ALT SERPL-CCNC: 7 U/L (ref 5–33)
ANION GAP SERPL CALCULATED.3IONS-SCNC: 17 MMOL/L (ref 7–19)
AST SERPL-CCNC: 11 U/L (ref 5–32)
BASOPHILS ABSOLUTE: 0.1 K/UL (ref 0–0.2)
BASOPHILS RELATIVE PERCENT: 0.6 % (ref 0–1)
BILIRUB SERPL-MCNC: 0.3 MG/DL (ref 0.2–1.2)
BLOOD BANK DISPENSE STATUS: NORMAL
BLOOD BANK PRODUCT CODE: NORMAL
BPU ID: NORMAL
BUN BLDV-MCNC: 47 MG/DL (ref 6–20)
CALCIUM SERPL-MCNC: 8.8 MG/DL (ref 8.6–10)
CHLORIDE BLD-SCNC: 93 MMOL/L (ref 98–111)
CO2: 24 MMOL/L (ref 22–29)
CREAT SERPL-MCNC: 2.3 MG/DL (ref 0.5–0.9)
DESCRIPTION BLOOD BANK: NORMAL
EOSINOPHILS ABSOLUTE: 0.1 K/UL (ref 0–0.6)
EOSINOPHILS RELATIVE PERCENT: 0.8 % (ref 0–5)
GFR AFRICAN AMERICAN: 27
GFR NON-AFRICAN AMERICAN: 22
GLUCOSE BLD-MCNC: 399 MG/DL (ref 70–99)
GLUCOSE BLD-MCNC: 402 MG/DL (ref 74–109)
GLUCOSE BLD-MCNC: 467 MG/DL (ref 70–99)
GLUCOSE BLD-MCNC: 488 MG/DL (ref 70–99)
GLUCOSE BLD-MCNC: 497 MG/DL (ref 70–99)
GLUCOSE BLD-MCNC: 506 MG/DL (ref 70–99)
HCT VFR BLD CALC: 22 % (ref 37–47)
HCT VFR BLD CALC: 23 % (ref 37–47)
HEMOGLOBIN: 6.6 G/DL (ref 12–16)
HEMOGLOBIN: 6.8 G/DL (ref 12–16)
IMMATURE GRANULOCYTES #: 0 K/UL
INR BLD: 1.22 (ref 0.88–1.18)
LYMPHOCYTES ABSOLUTE: 0.7 K/UL (ref 1.1–4.5)
LYMPHOCYTES RELATIVE PERCENT: 7.2 % (ref 20–40)
MAGNESIUM: 2.3 MG/DL (ref 1.6–2.6)
MCH RBC QN AUTO: 28.9 PG (ref 27–31)
MCHC RBC AUTO-ENTMCNC: 30 G/DL (ref 33–37)
MCV RBC AUTO: 96.5 FL (ref 81–99)
MONOCYTES ABSOLUTE: 0.8 K/UL (ref 0–0.9)
MONOCYTES RELATIVE PERCENT: 8.7 % (ref 0–10)
NEUTROPHILS ABSOLUTE: 7.4 K/UL (ref 1.5–7.5)
NEUTROPHILS RELATIVE PERCENT: 82.4 % (ref 50–65)
PDW BLD-RTO: 18.4 % (ref 11.5–14.5)
PERFORMED ON: ABNORMAL
PHOSPHORUS: 5 MG/DL (ref 2.5–4.5)
PLATELET # BLD: 267 K/UL (ref 130–400)
PMV BLD AUTO: 9.7 FL (ref 9.4–12.3)
POTASSIUM SERPL-SCNC: 4.2 MMOL/L (ref 3.5–5)
PROTHROMBIN TIME: 15.4 SEC (ref 12–14.6)
RBC # BLD: 2.28 M/UL (ref 4.2–5.4)
SODIUM BLD-SCNC: 134 MMOL/L (ref 136–145)
TOTAL PROTEIN: 5.6 G/DL (ref 6.6–8.7)
WBC # BLD: 9 K/UL (ref 4.8–10.8)

## 2022-05-17 PROCEDURE — 6370000000 HC RX 637 (ALT 250 FOR IP): Performed by: SURGERY

## 2022-05-17 PROCEDURE — 84100 ASSAY OF PHOSPHORUS: CPT

## 2022-05-17 PROCEDURE — 94003 VENT MGMT INPAT SUBQ DAY: CPT

## 2022-05-17 PROCEDURE — 6370000000 HC RX 637 (ALT 250 FOR IP): Performed by: INTERNAL MEDICINE

## 2022-05-17 PROCEDURE — 94640 AIRWAY INHALATION TREATMENT: CPT

## 2022-05-17 PROCEDURE — 85018 HEMOGLOBIN: CPT

## 2022-05-17 PROCEDURE — 36415 COLL VENOUS BLD VENIPUNCTURE: CPT

## 2022-05-17 PROCEDURE — 2500000003 HC RX 250 WO HCPCS: Performed by: SURGERY

## 2022-05-17 PROCEDURE — 6360000002 HC RX W HCPCS: Performed by: SURGERY

## 2022-05-17 PROCEDURE — 85610 PROTHROMBIN TIME: CPT

## 2022-05-17 PROCEDURE — 36430 TRANSFUSION BLD/BLD COMPNT: CPT

## 2022-05-17 PROCEDURE — 80053 COMPREHEN METABOLIC PANEL: CPT

## 2022-05-17 PROCEDURE — 83735 ASSAY OF MAGNESIUM: CPT

## 2022-05-17 PROCEDURE — 2000000000 HC ICU R&B

## 2022-05-17 PROCEDURE — 2580000003 HC RX 258: Performed by: ANESTHESIOLOGY

## 2022-05-17 PROCEDURE — 82947 ASSAY GLUCOSE BLOOD QUANT: CPT

## 2022-05-17 PROCEDURE — 6370000000 HC RX 637 (ALT 250 FOR IP): Performed by: PSYCHIATRY & NEUROLOGY

## 2022-05-17 PROCEDURE — 85014 HEMATOCRIT: CPT

## 2022-05-17 PROCEDURE — 2580000003 HC RX 258: Performed by: SURGERY

## 2022-05-17 PROCEDURE — 85025 COMPLETE CBC W/AUTO DIFF WBC: CPT

## 2022-05-17 PROCEDURE — 99232 SBSQ HOSP IP/OBS MODERATE 35: CPT | Performed by: PSYCHIATRY & NEUROLOGY

## 2022-05-17 PROCEDURE — 2700000000 HC OXYGEN THERAPY PER DAY

## 2022-05-17 RX ORDER — SODIUM CHLORIDE 9 MG/ML
INJECTION, SOLUTION INTRAVENOUS PRN
Status: DISCONTINUED | OUTPATIENT
Start: 2022-05-17 | End: 2022-05-18 | Stop reason: ALTCHOICE

## 2022-05-17 RX ORDER — INSULIN GLARGINE 100 [IU]/ML
5 INJECTION, SOLUTION SUBCUTANEOUS NIGHTLY
Status: DISCONTINUED | OUTPATIENT
Start: 2022-05-17 | End: 2022-05-18

## 2022-05-17 RX ADMIN — IPRATROPIUM BROMIDE AND ALBUTEROL SULFATE 1 AMPULE: 2.5; .5 SOLUTION RESPIRATORY (INHALATION) at 22:25

## 2022-05-17 RX ADMIN — LEVOTHYROXINE SODIUM 150 MCG: 75 TABLET ORAL at 05:20

## 2022-05-17 RX ADMIN — LEVETIRACETAM 500 MG: 500 SOLUTION ORAL at 20:50

## 2022-05-17 RX ADMIN — INSULIN LISPRO 3 UNITS: 100 INJECTION, SOLUTION INTRAVENOUS; SUBCUTANEOUS at 20:53

## 2022-05-17 RX ADMIN — ATORVASTATIN CALCIUM 20 MG: 20 TABLET, FILM COATED ORAL at 09:04

## 2022-05-17 RX ADMIN — COLLAGENASE SANTYL: 250 OINTMENT TOPICAL at 09:05

## 2022-05-17 RX ADMIN — HYDRALAZINE HYDROCHLORIDE 10 MG: 20 INJECTION INTRAMUSCULAR; INTRAVENOUS at 23:19

## 2022-05-17 RX ADMIN — ROPINIROLE HYDROCHLORIDE 4 MG: 4 TABLET, FILM COATED ORAL at 20:53

## 2022-05-17 RX ADMIN — METOPROLOL SUCCINATE 100 MG: 50 TABLET, EXTENDED RELEASE ORAL at 05:22

## 2022-05-17 RX ADMIN — DULOXETINE 60 MG: 60 CAPSULE, DELAYED RELEASE ORAL at 09:04

## 2022-05-17 RX ADMIN — INSULIN GLARGINE 5 UNITS: 100 INJECTION, SOLUTION SUBCUTANEOUS at 13:46

## 2022-05-17 RX ADMIN — SEVELAMER CARBONATE 0.8 G: 800 POWDER, FOR SUSPENSION ORAL at 12:12

## 2022-05-17 RX ADMIN — ACETYLCYSTEINE 600 MG: 200 SOLUTION ORAL; RESPIRATORY (INHALATION) at 07:51

## 2022-05-17 RX ADMIN — PROPOFOL 20 MCG/KG/MIN: 10 INJECTION, EMULSION INTRAVENOUS at 09:59

## 2022-05-17 RX ADMIN — Medication 10 ML: at 20:51

## 2022-05-17 RX ADMIN — NIFEDIPINE 90 MG: 90 TABLET, FILM COATED, EXTENDED RELEASE ORAL at 09:01

## 2022-05-17 RX ADMIN — SODIUM ZIRCONIUM CYCLOSILICATE 10 G: 10 POWDER, FOR SUSPENSION ORAL at 09:04

## 2022-05-17 RX ADMIN — SEVELAMER CARBONATE 0.8 G: 800 POWDER, FOR SUSPENSION ORAL at 16:43

## 2022-05-17 RX ADMIN — HYDRALAZINE HYDROCHLORIDE 100 MG: 50 TABLET, FILM COATED ORAL at 20:50

## 2022-05-17 RX ADMIN — CHLORHEXIDINE GLUCONATE 15 ML: 1.2 RINSE ORAL at 20:52

## 2022-05-17 RX ADMIN — INSULIN LISPRO 6 UNITS: 100 INJECTION, SOLUTION INTRAVENOUS; SUBCUTANEOUS at 10:19

## 2022-05-17 RX ADMIN — IPRATROPIUM BROMIDE AND ALBUTEROL SULFATE 1 AMPULE: 2.5; .5 SOLUTION RESPIRATORY (INHALATION) at 02:10

## 2022-05-17 RX ADMIN — ANORECTAL OINTMENT: 15.7; .44; 24; 20.6 OINTMENT TOPICAL at 05:21

## 2022-05-17 RX ADMIN — IPRATROPIUM BROMIDE AND ALBUTEROL SULFATE 1 AMPULE: 2.5; .5 SOLUTION RESPIRATORY (INHALATION) at 19:35

## 2022-05-17 RX ADMIN — IPRATROPIUM BROMIDE AND ALBUTEROL SULFATE 1 AMPULE: 2.5; .5 SOLUTION RESPIRATORY (INHALATION) at 14:30

## 2022-05-17 RX ADMIN — HEPARIN SODIUM 5000 UNITS: 5000 INJECTION INTRAVENOUS; SUBCUTANEOUS at 20:50

## 2022-05-17 RX ADMIN — ASPIRIN 81 MG 81 MG: 81 TABLET ORAL at 09:04

## 2022-05-17 RX ADMIN — HYDRALAZINE HYDROCHLORIDE 100 MG: 50 TABLET, FILM COATED ORAL at 05:20

## 2022-05-17 RX ADMIN — Medication 10 ML: at 09:06

## 2022-05-17 RX ADMIN — INSULIN LISPRO 6 UNITS: 100 INJECTION, SOLUTION INTRAVENOUS; SUBCUTANEOUS at 16:46

## 2022-05-17 RX ADMIN — ANORECTAL OINTMENT: 15.7; .44; 24; 20.6 OINTMENT TOPICAL at 16:50

## 2022-05-17 RX ADMIN — SODIUM CHLORIDE, PRESERVATIVE FREE 20 MG: 5 INJECTION INTRAVENOUS at 09:00

## 2022-05-17 RX ADMIN — SEVELAMER CARBONATE 0.8 G: 800 POWDER, FOR SUSPENSION ORAL at 09:03

## 2022-05-17 RX ADMIN — INSULIN LISPRO 6 UNITS: 100 INJECTION, SOLUTION INTRAVENOUS; SUBCUTANEOUS at 12:12

## 2022-05-17 RX ADMIN — HEPARIN SODIUM 5000 UNITS: 5000 INJECTION INTRAVENOUS; SUBCUTANEOUS at 15:18

## 2022-05-17 RX ADMIN — ISOSORBIDE MONONITRATE 120 MG: 60 TABLET, EXTENDED RELEASE ORAL at 09:01

## 2022-05-17 RX ADMIN — HYDRALAZINE HYDROCHLORIDE 10 MG: 20 INJECTION INTRAMUSCULAR; INTRAVENOUS at 01:04

## 2022-05-17 RX ADMIN — CHLORHEXIDINE GLUCONATE 15 ML: 1.2 RINSE ORAL at 09:05

## 2022-05-17 RX ADMIN — HEPARIN SODIUM 5000 UNITS: 5000 INJECTION INTRAVENOUS; SUBCUTANEOUS at 09:00

## 2022-05-17 RX ADMIN — IPRATROPIUM BROMIDE AND ALBUTEROL SULFATE 1 AMPULE: 2.5; .5 SOLUTION RESPIRATORY (INHALATION) at 07:50

## 2022-05-17 RX ADMIN — IPRATROPIUM BROMIDE AND ALBUTEROL SULFATE 1 AMPULE: 2.5; .5 SOLUTION RESPIRATORY (INHALATION) at 10:22

## 2022-05-17 RX ADMIN — ACETYLCYSTEINE 600 MG: 200 SOLUTION ORAL; RESPIRATORY (INHALATION) at 19:35

## 2022-05-17 RX ADMIN — LEVETIRACETAM 500 MG: 500 SOLUTION ORAL at 09:00

## 2022-05-17 ASSESSMENT — PULMONARY FUNCTION TESTS
PIF_VALUE: 17
PIF_VALUE: 18
PIF_VALUE: 23
PIF_VALUE: 17
PIF_VALUE: 18
PIF_VALUE: 17
PIF_VALUE: 17
PIF_VALUE: 25
PIF_VALUE: 17
PIF_VALUE: 18
PIF_VALUE: 17
PIF_VALUE: 17
PIF_VALUE: 19
PIF_VALUE: 26
PIF_VALUE: 18
PIF_VALUE: 17
PIF_VALUE: 18
PIF_VALUE: 17
PIF_VALUE: 18
PIF_VALUE: 18
PIF_VALUE: 24
PIF_VALUE: 23
PIF_VALUE: 17
PIF_VALUE: 18
PIF_VALUE: 18
PIF_VALUE: 17
PIF_VALUE: 18

## 2022-05-17 ASSESSMENT — PAIN SCALES - GENERAL
PAINLEVEL_OUTOF10: 0

## 2022-05-17 NOTE — PROGRESS NOTES
[facility] Neurology Progress Note      Patient:   Ina Zamarripa  MR#:    [id number]   Room:    9953/727-74   YOB: 1970  Date of Progress Note: 5/17/2022  Time of Note                           3:49 PM  Consulting Physician:  Anny Santiago DO  Attending Physician:  Annika Lama MD      INTERVAL HISTORY:  No acute events, still localizing to pain, less withdrawal to pain over the the right side, appears more alert, but largely unchanged. REVIEW OF SYSTEMS:  Unable to obtain     PHYSICAL EXAM:    Constitutional    BP (!) 109/53   Pulse 88   Temp 99.9 °F (37.7 °C) (Temporal)   Resp 14   Ht 5' 6\" (1.676 m)   Wt 134 lb (60.8 kg)   SpO2 100%   BMI 21.63 kg/m²   General appearance: Intubated, sedated   EYES -   Conjunctiva normal  Pupillary exam as below, see CN exam in the neurologic exam  ENT-    No scars, masses, or lesions over external nose or ears  Oropharynx - intubated  Cardiovascular -   No clubbing, cyanosis  Pulmonary-   Mechanically ventilated   Musculoskeletal    No significant wasting of muscles noted  Gait as below, see gait exam in the neurologic exam  Muscle strength, tone, stability as below see the motor exam in the neurologic exam.   No bony deformities  Skin    Warm, dry, and intact to inspection and palpation. No rash, erythema, or pallor        NEUROLOGICAL EXAM     Mental status    []? Awake, alert, oriented   []? Affect attention and concentration appear appropriate  []? Recent and remote memory appears unremarkable  []? Speech normal without dysarthria or aphasia, comprehension and repetition intact. COMMENTS:  Unresponsive to voice, not following commands, does appear to localize to pain    Cranial Nerves []? No VF deficit to confrontation,  optic discs normal, no papilledema on fundoscopic exam.  []? PERRLA, EOMI, no nystagmus, conjugate eye movements, no ptosis  []? Face symmetric  []? Facial sensation intact  []?  Tongue midline no atrophy or fasciculations present  []? Palate midline, hearing to finger rub normal  []? Shoulder shrug and SCM testing normal  COMMENTS:  PERRL, Face appears sym, cough/gag present, corneals present    Motor   []? 5/5 strength x 4 extremities  [x]? Normal bulk and tone  [x]? No tremor present  []? No rigidity or bradykinesia noted  COMMENTS: Withdrawal x 4, less over RUE   Sensory  []? Sensation intact to light touch, pin prick, vibration, and proprioception BLE  []? Sensation intact to light touch, pin prick, vibration, and proprioception BUE  COMMENTS: Limited    Coordination []? FTN normal bilaterally   []? HTS normal bilaterally  []? SANFORD normal.   COMMENTS: Limited    Reflexes  [x]? Symmetric and non-pathological  [x]? Toes downgoing bilaterally  [x]? No clonus present  COMMENTS:   Gait                  []? Normal steady gait    []? Ataxic    []? Spastic     []? Magnetic     []? Shuffling  [x]? Not assessed  COMMENTS:        LABS/IMAGING:    CT Head WO Contrast    Result Date: 4/24/2022  CT HEAD WO CONTRAST 4/24/2022 2:01 PM HISTORY: Altered mental status COMPARISON: CT scan dated 11/18/2021 DOSE LENGTH PRODUCT: 766 mGy cm TECHNIQUE: Helical tomographic images of the brain were obtained without the use of intravenous contrast. Automated exposure control was also utilized to decrease patient radiation dose. FINDINGS: There is no evidence of evolving large vascular territory infarct. No visualized intra-axial or extra-axial hemorrhage. No mass lesion is identified. Normal size and configuration of the ventricular system. The basal cisterns are symmetric. Posterior fossa structures are unremarkable. The included orbits and their contents are unremarkable. Chronic paranasal sinus disease on the left. The visualized osseous structures and overlying soft tissues of the skull and face are unremarkable. 1. No acute intracranial process.  Signed by Dr [place]    XR CHEST PORTABLE    Result Date: 4/24/2022  XR CHEST PORTABLE 4/24/2022 4:27 PM HISTORY: ET tube, enteric tube  Technique: Single AP view of the chest COMPARISONS: 4/24/2022 FINDINGS: Endotracheal tube is identified with tip essentially touching the krysta. Enteric tube courses into the stomach with tip curled in the fundus back towards the gastroesophageal junction. The lungs are clear and well expanded. Heart size is stable. Pulmonary vasculature are nondilated. No pleural effusion or pneumothorax. 1. Endotracheal tube is deep, tip essentially touching the krysta. Lungs are clear and well expanded. I would recommend 2-3 cm withdrawal for a more optimal positioning. The results of this exam were discussed with Paola Rodriguez (ICU nursing) on 4/24/2022 at 1640 hours Signed by Dr Nicko An    XR CHEST PORTABLE    Result Date: 4/24/2022  XR CHEST PORTABLE 4/24/2022 1:17 PM HISTORY: ET tube placement  Technique: Single AP view of the chest COMPARISONS: 4/24/2022 FINDINGS: Status post endotracheal intubation. ET tube is in good position. Lungs are well-expanded. Enteric tube curls on itself in the midesophagus and then extends back up into the throat. Heart size is stable. Pulmonary vasculature are nondilated. 1. ET tube is in good position. Lungs are clear and well expanded. 2. Enteric tube curls on itself in the mid esophagus before extending back up into the throat. This will need repositioned. The results of this exam were discussed with Dr. [facility] on 4/24/2022 at 1329 hours Signed by Dr Nicko An    XR CHEST PORTABLE    Result Date: 4/24/2022  XR CHEST PORTABLE 4/24/2022 12:34 PM HISTORY: Altered mental status  Technique: Single AP view of the chest COMPARISONS: Chest exam dated 4/1/2022 FINDINGS: No lung consolidation. No pleural effusion or pneumothorax. Cardiomediastinal silhouette and pulmonary vasculature are unremarkable. No acute bony abnormality. Left subclavian region vascular stent with additional stent projecting over the left humerus.     No acute cardiopulmonary process. Signed by Dr Lizeth Ricci      Lab Results   Component Value Date    WBC 9.0 05/17/2022    HGB 6.8 (L) 05/17/2022    HCT 23.0 (L) 05/17/2022    MCV 96.5 05/17/2022     05/17/2022     Lab Results   Component Value Date     (L) 05/17/2022    K 4.2 05/17/2022    CL 93 (L) 05/17/2022    CO2 24 05/17/2022    BUN 47 (H) 05/17/2022    CREATININE 2.3 (H) 05/17/2022    GLUCOSE 402 (H) 05/17/2022    CALCIUM 8.8 05/17/2022    PROT 5.6 (L) 05/17/2022    LABALBU 2.8 (L) 05/17/2022    BILITOT 0.3 05/17/2022    ALKPHOS 109 (H) 05/17/2022    AST 11 05/17/2022    ALT 7 05/17/2022    LABGLOM 22 (A) 05/17/2022    GFRAA 27 (L) 05/17/2022     Lab Results   Component Value Date    INR 1.22 (H) 05/17/2022    INR 1.19 (H) 05/16/2022    INR 1.14 05/15/2022    PROTIME 15.4 (H) 05/17/2022    PROTIME 15.1 (H) 05/16/2022    PROTIME 14.6 05/15/2022       RECORD REVIEW:   Previous medical records, medications were reviewed at today's visit. Nursing/physician notes, imaging, labs and vitals reviewed. ASSESSMENT:  47 y.o. admitted after an episode of unresponsiveness followed by generalized tonic-clonic event in the emergency department. She had profoundly elevated glucose, hyponatremia, underlying UTI,  end-stage renal disease with a history of non-compliance noted. MRI with large scattered left MCA distribution stroke. ECHO negative from a cardioembolic standpoint. Trach and PEG now in place. Exam remains largely unchanged overnight. No clinical seizure activity noted.      PLAN:  1. Continue ASA, supportive care. 2.  Continue Keppra  3. Continue addressing respiratory failure, electrolyte and infectious issues, ESRD nephrology following  4. Limit sedating medications as able  5. Palliative care following      Please feel free to call with any questions. [phone] (cell phone).       Ling Delcid DO  Board Certified Neurology

## 2022-05-17 NOTE — OP NOTE
Operative Report    PATIENT NAME: Zeus [address] RECORD NO. [id number]  SURGEON: Steven Medrano MD MD   Primary Care Physician: Hammadon Arti, ROMINA  Date: 5/10/22   PROCEDURE PERFORMED: percutaneous tracheostomy and percutaneous gastrostomy tube placement  PREOPERATIVE DIAGNOSIS: respiratory failure  POSTOPERATIVE DIAGNOSIS: Same  SURGEON:  Steven Medrano MD   Assistant: Dr. Kathe Salmon:  General  ESTIMATED BLOOD LOSS: minimal  SPECIMEN:  none  COMPLICATIONS:  None; patient tolerated the procedure well. FINDINGS: no acute findings   DISPOSITION: PACU - hemodynamically stable. CONDITION: stable      Indications: The patient is a 47year old female with multiple medical issues, admitted with diabetic coma, seizure activity, requiring intubation, failed intubation. Now in need of trach and PEG for long term planning. Procedure Details     After the risks and benefits of the procedure were explained, informed consent was obtained, and the patient was taken to the operating room. The patient was placed in supine position and anesthesia was begun. The neck and chest were prepped and draped in normal sterile fashion. A time out was performed. The bronchoscope was inserted through the adaptor down the endotracheal tube. The tube was too small and had to be exchanged. The scope was then inserted down the new tube without difficulty. A location was chosen on the patient's neck in the midline, about two finger-breadths above the sternal notch. A vertical incision was made in the skin and it was gently dissected using a hemostat. The tracheal was palpated and held at midline. A laser pointer was used to transilluminate. The light was visualized by the bronchoscope and the ET tube was backed out to just above the location of the laser. The cook needle was used to cannulate the trachea and a guide wire was inserted under direct visualization.  The dilator was placed over this followed by the blue rhino dilator. The 8 shiley trach was placed over the dilator and inserted over the guide wire and the guidewire and dilator were removed. The balloon was inflated and the trach was hooked up to the vent with return of CO2. The bronchoscope was also passed through the trach and the krysta was visualized. The trach was secured to the neck with two stitches and the trach colla was placed. The abdomen was the exposed. The endoscope was inserted through the oropharynx in to the esophagus and stomach with no obvious abnormalities identified. The stomach was insufflated. The lights were dimmed and the scope light was easily visualized transilluminating the abdominal wall in the LUQ. A 1 cm incision was made with a scalpel and the cook needle was inserted through the abdominal wall in to the stomach. The guide wire was inserted through the needle and grasped with the loop through the scope. The guide wire was brought out through the mouth and attached to the 20 gastrostomy tube and pulled back through the oropharynx and esophagus and made to lay securely against the stomach wall. It was secured to the abdominal wall at 3 cm at the skin and attached to the bumper which was sewn in place. The patient tolerated the procedure well, was removed from anesthesia, and transferred to the recovery area in stable condition.                   Job Cárdenas MD   Electronically signed 05/17/22 1:14 PM

## 2022-05-17 NOTE — PROGRESS NOTES
Comprehensive Nutrition Assessment    Type and Reason for Visit:  Reassess    Nutrition Recommendations/Plan:   1. Blood glucose levels remain elevated. Recommend MD to evaluate medications given with Dextrose and make adjustments as needed. Malnutrition Assessment:  Malnutrition Status:  Severe malnutrition (05/11/22 0915)        Nutrition Assessment:    Pt tolerates EN. Propofol continues to provide 95.04 non-protein kcals/day. Blood glucose levels remain elevated. Recommend MD to evaluate medications given with Dextrose and make adjustments as needed. Will continue to monitor clinical course. Current Nutrition Intake & Therapies:    Average Meal Intake: NPO      Anthropometric Measures:  Height: 5' 6\" (167.6 cm)  Current Body Weight: 134 lb (60.8 kg)              BMI Categories: Normal Weight (BMI 18.5-24. 9)    Nutrition Diagnosis:   · Inadequate oral intake related to impaired respiratory function as evidenced by NPO or clear liquid status due to medical condition,intubation,nutrition support - enteral nutrition      Nutrition Interventions:   Food and/or Nutrient Delivery: Continue NPO,Continue Current Tube Feeding  Coordination of Nutrition Care: Continue to monitor while inpatient    Goals:  Previous Goal Met: Progressing toward Goal(s)  Goals: Meet at least 75% of estimated needs,Tolerate nutrition support at goal rate       Nutrition Monitoring and Evaluation:   Food/Nutrient Intake Outcomes: Enteral Nutrition Intake/Tolerance  Physical Signs/Symptoms Outcomes: Biochemical Data,Fluid Status or Edema,Weight,Skin,Nutrition Focused Physical Findings      Fatimah Zhang MS, RD, LD  Contact: [phone]

## 2022-05-17 NOTE — PROGRESS NOTES
Hospitalist Progress Note  [facility]     Patient: Mindy Christianson  : 1970  MRN: [id number]  Code Status: Full Code    Hospital Day:    Date of Service: 2022    Subjective:   Patient seen and examined. Trached and sedated.     Past Medical History:   Diagnosis Date    Acute ischemic stroke ([facility] 2022    Arthritis     Chronic kidney disease, stage 5, kidney failure (HCC)     Closed fracture of right shoulder girdle, with routine healing, subsequent encounter     DM (diabetes mellitus) type II controlled with renal manifestation ([facility] [facility] 1993    Gastroparesis     GERD (gastroesophageal reflux disease)     Hemodialysis patient ([facility] [address]     dialysis on tues, thur, and sat at [facility]    Hypertension     Hypothyroid     Nasal congestion     recent    Neuropathy     Noncompliance with medications     Palliative care patient 10/08/2019    Restless leg syndrome        Past Surgical History:   Procedure Laterality Date    BREAST SURGERY Left     infected milk gland removed    BREAST SURGERY Right 2021    WEDGE EXCISION RIGHT BREAST DUCT WITH LACRIMAL DUCT PROBE, PEC BLOCK performed by Lobo Pascual MD at [address] [place] [address]          COLONOSCOPY Left 2020    Dr Olympia Essex hepatic flexure-Severe tortuosity with severe spasming, 1 yr recall    DIALYSIS FISTULA CREATION Left 2019    REVISION LEFT UPPER EXTREMITY AV ACCESS WITH LEFT BRACHIAL ARTERY TO LEFT AXILLARY VEIN WITH  INTERPOSITIONAL ARTEGRAFT   performed by Joshua Dos Santos MD at [address]      GASTROSTOMY TUBE PLACEMENT N/A 5/10/2022    PERCUTANEOUS ENDOSCOPIC GASTROSTOMY TUBE PLACEMENT, performed by Rosa Elena Huang MD at [address] Right 2019    INSERTION OF RIGHT INTERNAL JUGULAR HEMODIALYSIS CATHETER performed by Joshua Dos Santos MD at [address] [facility]  2018    1.  Moderately increased stainable iron identified by special stain in Kupffer cells and occasional hepatocytes. Negative for evidence of significant portal or lobular inflammation. Negative for evidence of significant fibrosis    NM COLONOSCOPY W/BIOPSY SINGLE/MULTIPLE N/A 10/20/2017    Dr Nestor Vazquez prep-Tubular AP (-) dysplasia x 2--3 yr recall    NM EGD TRANSORAL BIOPSY SINGLE/MULTIPLE N/A 2016    Dr José Luis Rodriguez EGD TRANSORAL BIOPSY SINGLE/MULTIPLE N/A 10/20/2017    Dr Lynda Shah (-)    TRACHEOSTOMY  5/10/2022    TRACHEOSTOMY PERCUTANEOUS performed by Stiven Cabezas MD at [address] VASCULAR SURGERY Left 2016    fistula by Dr Pamela Jacobs at [address]  10/02/2017    [facility]. Left upper fistulograms/venograms, balloon angioplasty cephalic vein arch 36S86 conquest.    VASCULAR SURGERY  2018    FISTULOGRAM    VASCULAR SURGERY  10/29/2018    [facility]. Left upper fistulograms/venograms    VASCULAR SURGERY  2018    [facility]. Arch aortogram,left upper arteriograms.  VASCULAR SURGERY  2019    [facility]. Removal of tunneled dialyis catheter right internal jugular vein.  VASCULAR SURGERY  2019    [facility]. Left upper extremity fistulogram including venography to the superior vena cava. Balloon angioplasty left subclavian vein with 10mm x 40mm conquest balloon. Balloon angioplasty mid/proximal upper arm cephalic vein with 88QW x 40mm conquest balloon. Venograms after balloon angioplasty. Stent left mid/proximal upper arm cephalic vein stenosis fluency 10mm x 60mm self-expanding covered stent. Balloon     VASCULAR SURGERY  2019    cont angioplasty stent with 10mm x 40mm conquest balloon. Completion venograms left upper extremity.        Family History   Problem Relation Age of Onset    Colon Cancer Mother          at 63    Colon Polyps Mother     Lung Cancer Father          at 66    Colon Polyps Father     Stomach Cancer Sister     Breast Cancer Sister 27    Depression Daughter 25 committed suicide    Liver Cancer Neg Hx     Liver Disease Neg Hx     Esophageal Cancer Neg Hx     Rectal Cancer Neg Hx        Social History     Socioeconomic History    Marital status: Single     Spouse name: Not on file    Number of children: 2    Years of education: Not on file    Highest education level: Not on file   Occupational History    Not on file   Tobacco Use    Smoking status: Current Every Day Smoker     Packs/day: 0.50     Years: 33.00     Pack years: 16.50     Types: Cigarettes    Smokeless tobacco: Never Used    Tobacco comment: NOW at 1/4 PD, started at age 24 and has averaged 2 PPD   Vaping Use    Vaping Use: Never used   Substance and Sexual Activity    Alcohol use: No    Drug use: Not Currently     Types: Marijuana Garon Kettle)    Sexual activity: Not Currently     Partners: Male   Other Topics Concern    Not on file   Social History Narrative    Not on file     Social Determinants of Health     Financial Resource Strain:     Difficulty of Paying Living Expenses: Not on file   Food Insecurity:     Worried About Running Out of Food in the Last Year: Not on file    Kendy of Food in the Last Year: Not on file   Transportation Needs:     Lack of Transportation (Medical): Not on file    Lack of Transportation (Non-Medical):  Not on file   Physical Activity:     Days of Exercise per Week: Not on file    Minutes of Exercise per Session: Not on file   Stress:     Feeling of Stress : Not on file   Social Connections:     Frequency of Communication with Friends and Family: Not on file    Frequency of Social Gatherings with Friends and Family: Not on file    Attends [employer]ology Services: Not on file    Active Member of Clubs or Organizations: Not on file    Attends Club or Organization Meetings: Not on file    Marital Status: Not on file   Intimate Partner Violence:     Fear of Current or Ex-Partner: Not on file    Emotionally Abused: Not on file    Physically Abused: Not on file    Sexually Abused: Not on file   Housing Stability:     Unable to Pay for Housing in the Last Year: Not on file    Number of Places Lived in the Last Year: Not on file    Unstable Housing in the Last Year: Not on file       Current Facility-Administered Medications   Medication Dose Route Frequency Provider Last Rate Last Admin    0.9 % sodium chloride infusion   IntraVENous PRN Josi Pratt MD        sodium zirconium cyclosilicate (LOKELMA) oral suspension 10 g  10 g Oral TID PRN Jared Gotti MD        sevelamer (RENVELA) packet 0.8 g  0.8 g Oral TID  Jesús Powell MD   0.8 g at 05/17/22 1212    amLODIPine (NORVASC) tablet 10 mg  10 mg Oral Daily Patience Walter, DO   10 mg at 05/16/22 0002    insulin lispro (HUMALOG) injection vial 0-6 Units  0-6 Units SubCUTAneous TID  Jesús Powell MD   6 Units at 05/17/22 1212    insulin lispro (HUMALOG) injection vial 0-3 Units  0-3 Units SubCUTAneous Nightly Jesús Powell MD   3 Units at 05/16/22 2148    levETIRAcetam (KEPPRA) 100 MG/ML solution 500 mg  500 mg Per G Tube BID Bisi Dennis MD   500 mg at 05/17/22 0900    atorvastatin (LIPITOR) tablet 20 mg  20 mg Oral Daily Jesús Powell MD   20 mg at 05/17/22 0904    rOPINIRole (REQUIP) tablet 4 mg  4 mg Oral Nightly Jesús Powell MD   4 mg at 05/16/22 2147    NIFEdipine (PROCARDIA XL) extended release tablet 90 mg  90 mg Oral Daily Jesús Powell MD   90 mg at 05/17/22 0901    metoprolol succinate (TOPROL XL) extended release tablet 100 mg  100 mg Oral QAM AC Jesús Powell MD   100 mg at 05/17/22 0522    levothyroxine (SYNTHROID) tablet 150 mcg  150 mcg Oral Daily Jesús Powell MD   150 mcg at 05/17/22 0520    isosorbide mononitrate (IMDUR) extended release tablet 120 mg  120 mg Oral Daily Jesús Powell MD   120 mg at 05/17/22 0901    DULoxetine (CYMBALTA) extended release capsule 60 mg  60 mg Oral Daily Jesús Powell MD   60 mg at 05/17/22 0904    collagenase ointment Topical Daily Jesús Powell MD   Given at 05/17/22 8292    sodium chloride flush 0.9 % injection 5-40 mL  5-40 mL IntraVENous 2 times per day Stephan Phipps MD   10 mL at 05/17/22 0906    sodium chloride flush 0.9 % injection 5-40 mL  5-40 mL IntraVENous PRN Stephan Phipps MD        0.9 % sodium chloride infusion   IntraVENous PRN Stephan Phipps MD        HYDROmorphone HCl PF (DILAUDID) injection 0.25 mg  0.25 mg IntraVENous Q5 Min PRN Stephan Phipps MD        HYDROmorphone HCl PF (DILAUDID) injection 0.5 mg  0.5 mg IntraVENous Q5 Min PRN Stephan Phipps MD        vancomycin Beth Patrick) intermittent dosing (placeholder)   Other RX Placeholder Nati Pope MD        aminoglycoside intermittent dosing (placeholder)   Other RX Placeholder Nati Pope MD        acetylcysteine (MUCOMYST) 20 % solution 600 mg  600 mg Inhalation BID Nati Pope MD   600 mg at 05/17/22 0751    ipratropium-albuterol (DUONEB) nebulizer solution 1 ampule  1 ampule Inhalation Q4H Nati Pope MD   1 ampule at 05/17/22 1022    [Held by provider] insulin glargine (LANTUS) injection vial 20 Units  20 Units SubCUTAneous BID Nati Pope MD   20 Units at 05/13/22 2050    hydrALAZINE (APRESOLINE) injection 10 mg  10 mg IntraVENous Q6H PRN Nati Pope MD   10 mg at 05/17/22 0104    aspirin chewable tablet 81 mg  81 mg Oral Daily Nati Pope MD   81 mg at 05/17/22 0904    racepinephrine HCl (VAPONEFPRIN) 2.25 % nebulizer solution NEBU 11.25 mg  11.25 mg Nebulization Q4H PRN Nati Pope MD   11.25 mg at 05/03/22 1408    sodium chloride nebulizer 0.9 % solution 3 mL  3 mL Nebulization Q4H PRN Nati Pope MD        propofol injection  5-50 mcg/kg/min IntraVENous Continuous Nati Pope MD 3.6 mL/hr at 05/17/22 1214 10 mcg/kg/min at 05/17/22 1214    heparin (porcine) injection 5,000 Units  5,000 Units SubCUTAneous TID Nati Pope MD   5,000 Units at 05/17/22 0900    Peppermint Spirit SPRT   Does not apply PRN Annalee oRdriguez MD        menthol-zinc oxide (CALMOSEPTINE) 0.44-20.6 % ointment   Topical BID Aide Gee MD   Given at 05/17/22 1850    sodium phosphate 10.5 mmol in sodium chloride 0.9 % 250 mL IVPB  0.16 mmol/kg IntraVENous PRN Annalee Rodriguez MD        Or    sodium phosphate 21 mmol in sodium chloride 0.9 % 250 mL IVPB  0.32 mmol/kg IntraVENous PRN Annalee Rodriguez MD        hydrALAZINE (APRESOLINE) tablet 100 mg  100 mg Oral 3 times per day Annalee Rodriguez MD   100 mg at 05/17/22 0520    glucagon (rDNA) injection 1 mg  1 mg IntraMUSCular PRN Annalee Rodriguez MD        dextrose 5 % solution  100 mL/hr IntraVENous PRN Annalee Rodriguez MD        glucose chewable tablet 16 g  4 tablet Oral PRN Annalee Rodriguez MD        dextrose bolus (hypoglycemia) 10% 125 mL  125 mL IntraVENous PRN Annalee Rodriguez MD   Stopped at 05/11/22 2016    Or    dextrose bolus (hypoglycemia) 10% 250 mL  250 mL IntraVENous PRN Annalee Rodriguez MD   Stopped at 05/10/22 2026    LORazepam (ATIVAN) injection 1 mg  1 mg IntraVENous Q5 Min PRN Annalee Rodriguez MD        ondansetron (ZOFRAN-ODT) disintegrating tablet 4 mg  4 mg Oral Q8H PRN Annalee Rodriguez MD        Or    ondansetron [facility]) injection 4 mg  4 mg IntraVENous Q6H PRN Annalee Rodriguez MD        polyethylene glycol [facility]) packet 17 g  17 g Oral Daily PRN Annalee Rodriguez MD        acetaminophen (TYLENOL) tablet 650 mg  650 mg Oral Q6H PRN Annalee Rodriguez MD   650 mg at 04/25/22 0035    Or    acetaminophen (TYLENOL) suppository 650 mg  650 mg Rectal Q6H PRN Annalee Rodriguez MD        chlorhexidine (PERIDEX) 0.12 % solution 15 mL  15 mL Mouth/Throat BID Annalee Rodriguez MD   15 mL at 05/17/22 0905    famotidine (PEPCID) 20 mg in sodium chloride (PF) 10 mL injection  20 mg IntraVENous Daily Annalee Rodriguez MD   20 mg at 05/17/22 0900    magic butt cream   Topical Q4H PRN Annalee Rodriguez MD   Given at 05/03/22 0940    magnesium sulfate 1000 mg in dextrose 5% 100 mL IVPB  1,000 mg IntraVENous PRN Shauna Matute MD        potassium bicarb-citric acid (EFFER-K) effervescent tablet 40 mEq  40 mEq Oral PRN Shauna Matute MD        Or    potassium chloride 10 mEq/100 mL IVPB (Peripheral Line)  10 mEq IntraVENous PRN Shauna Matute MD   Stopped at 04/25/22 0936         sodium chloride      sodium chloride      propofol 10 mcg/kg/min (05/17/22 1214)    dextrose          Objective:   BP (!) 125/49   Pulse 95   Temp 99.9 °F (37.7 °C) (Temporal)   Resp 14   Ht 5' 6\" (1.676 m)   Wt 134 lb (60.8 kg)   SpO2 95%   BMI 21.63 kg/m²     General: no acute distress  HEENT: normocephalic, atraumatic  Lungs: faint rhonchi  Cardiovascular: s1 and s2 normal  Abdomen: soft, positive bowel sounds  Extremities: no cyanosis   Neuro: trached and sedated    Recent Labs     05/15/22  0154 05/15/22  0906 05/16/22 0454 05/17/22 0132 05/17/22  0242   WBC 10.9*  --  11.5* 9.0  --    RBC 2.37*  --  2.82* 2.28*  --    HGB 6.7*   < > 8.1* 6.6* 6.8*   HCT 22.2*   < > 27.2* 22.0* 23.0*   MCV 93.7  --  96.5 96.5  --    MCH 28.3  --  28.7 28.9  --    MCHC 30.2*  --  29.8* 30.0*  --      --  329 267  --     < > = values in this interval not displayed. Recent Labs     05/15/22  0154 05/16/22  0454 05/17/22  0132   * 130* 134*   K 5.5* 6.7* 4.2   ANIONGAP 20* 26* 17   CL 89* 85* 93*   CO2 21* 19* 24   BUN 62* 89* 47*   CREATININE 3.5* 4.4* 2.3*   GLUCOSE 230* 303* 402*   CALCIUM 9.3 9.3 8.8     Recent Labs     05/15/22  0154 05/16/22  0454 05/17/22  0132   MG 2.5 2.7* 2.3   PHOS 7.5* 8.8* 5.0*     Recent Labs     05/15/22  0154 05/16/22  0454 05/17/22  0132   AST 13 13 11   ALT 7 8 7   BILITOT 0.3 0.4 0.3   ALKPHOS 98 114* 109*     No results for input(s): PH, PO2, PCO2, HCO3, BE, O2SAT in the last 72 hours. No results for input(s): TROPONINI in the last 72 hours.   Recent Labs     05/15/22  0154 05/16/22  0454 05/17/22  0132   INR 1.14 1.19* 1.22*     No results for input(s): LACTA in the last 72 hours. Intake/Output Summary (Last 24 hours) at 5/17/2022 1325  Last data filed at 5/17/2022 1214  Gross per 24 hour   Intake 1222.31 ml   Output 150 ml   Net 1072.31 ml       No results found.      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 Devonte Lo     ESRD on HD  Renal following     Medical noncompliance       C. difficile infection  Completed oral vancomycin course     Tobacco dependence       IDDM 2  Meds on board     DVT prophylaxis  Heparin    Dispo: Discussed with social work    Total critical care time: 40 minutes    Juanis Diamond MD   5/17/2022 1:25 PM

## 2022-05-17 NOTE — PROGRESS NOTES
Nephrology ([facility]) Progress Note    Patient:  Danitza Helton  YOB: 1970  Date of Service: 5/17/2022  MRN: [id number]   Acct: [de-identified]   Primary Care Physician: ROMINA Strong  Advance Directive: Full Code  Admit Date: 4/24/2022       Hospital Day: 23  Referring Provider: Maritza Allred MD    Patient independently seen and examined, Chart, Consults, Notes, Operative notes, Labs, Cardiology, and Radiology studies reviewed as available. Chief complaint: Altered mental status/end-stage renal disease    Subjective:  Danitza Helton is a 47 y.o. female for whom we were consulted for evaluation and treatment of end-stage renal disease. Patient also has history of seizure disorder, type 2 diabetes, hypertension, frequent hospitalization with noncompliance and poorly controlled diabetes. Patient was brought to the emergency room after she was found to have unresponsiveness and witnessed grand mal seizure. She was brought in by ambulance. On arrival in the emergency room she was found to be severely hyperglycemic and had a urinary tract infection. Patient was intubated, sedated and currently being treated by neurology for grand mal seizure. She is also receiving IV antibiotics and insulin drip. Her blood sugar control has significantly improved. On April 25, she has received emergent hemodialysis treatment for correction of volume status as well as hyponatremia. She is now moved from ICU to CCU. Recent MRI of brain showed Ischemic brain infarct. Recently patient underwent tracheotomy as she failed to wean off of the ventilator. She also had PEG placement. She is now waiting to go to long-term acute care hospital.  She has received routine hemodialysis treatment yesterday    This morning she is remained on ventilator via tracheotomy. She has poor neurological response but opens eyes with vocal command and goes back to sleep.       Allergies:  Penicillins, Lamisil advanced [terbinafine], Reglan [metoclopramide], and Stadol [butorphanol]    Medicines:  Current Facility-Administered Medications   Medication Dose Route Frequency Provider Last Rate Last Admin    0.9 % sodium chloride infusion   IntraVENous PRN Pedro Pathak MD        sodium zirconium cyclosilicate [facility]) oral suspension 10 g  10 g Oral TID Patience Walter, DO   10 g at 05/17/22 0904    sevelamer (RENVELA) packet 0.8 g  0.8 g Oral TID  James Head MD   0.8 g at 05/17/22 0903    0.9 % sodium chloride infusion   IntraVENous PRN Patience Walter, DO        amLODIPine (NORVASC) tablet 10 mg  10 mg Oral Daily Patience Walter, DO   10 mg at 05/16/22 0002    insulin lispro (HUMALOG) injection vial 0-6 Units  0-6 Units SubCUTAneous TID WC James Head MD   3 Units at 05/16/22 1745    insulin lispro (HUMALOG) injection vial 0-3 Units  0-3 Units SubCUTAneous Nightly James Head MD   3 Units at 05/16/22 2148    levETIRAcetam (KEPPRA) 100 MG/ML solution 500 mg  500 mg Per G Tube BID Benito Holstein, MD   500 mg at 05/17/22 0900    atorvastatin (LIPITOR) tablet 20 mg  20 mg Oral Daily James Head MD   20 mg at 05/17/22 0904    rOPINIRole (REQUIP) tablet 4 mg  4 mg Oral Nightly James Head MD   4 mg at 05/16/22 2147    NIFEdipine (PROCARDIA XL) extended release tablet 90 mg  90 mg Oral Daily James Head MD   90 mg at 05/17/22 0901    metoprolol succinate (TOPROL XL) extended release tablet 100 mg  100 mg Oral QAM AC James Head MD   100 mg at 05/17/22 0522    levothyroxine (SYNTHROID) tablet 150 mcg  150 mcg Oral Daily James Head MD   150 mcg at 05/17/22 0520    isosorbide mononitrate (IMDUR) extended release tablet 120 mg  120 mg Oral Daily James Head MD   120 mg at 05/17/22 0901    DULoxetine (CYMBALTA) extended release capsule 60 mg  60 mg Oral Daily James Head MD   60 mg at 05/17/22 9094    collagenase ointment   Topical Daily James Head MD   Given at 05/17/22 4739    sodium chloride flush 0.9 % injection 5-40 mL  5-40 mL IntraVENous 2 times per day Paz Crews MD   10 mL at 05/17/22 0906    sodium chloride flush 0.9 % injection 5-40 mL  5-40 mL IntraVENous PRN Paz Crews MD        0.9 % sodium chloride infusion   IntraVENous PRN Paz Crews MD        HYDROmorphone HCl PF (DILAUDID) injection 0.25 mg  0.25 mg IntraVENous Q5 Min PRN Paz Crews MD        HYDROmorphone HCl PF (DILAUDID) injection 0.5 mg  0.5 mg IntraVENous Q5 Min PRN Paz Crews MD        vancomycin Almita Alton) intermittent dosing (placeholder)   Other RX Placeholder Steven Medrano MD        aminoglycoside intermittent dosing (placeholder)   Other RX Yvanholder Steven Medrano MD        acetylcysteine (MUCOMYST) 20 % solution 600 mg  600 mg Inhalation BID Steven Medrano MD   600 mg at 05/17/22 0751    ipratropium-albuterol (DUONEB) nebulizer solution 1 ampule  1 ampule Inhalation Q4H Steven eMdrano MD   1 ampule at 05/17/22 0750    [Held by provider] insulin glargine (LANTUS) injection vial 20 Units  20 Units SubCUTAneous BID Steven Medrano MD   20 Units at 05/13/22 2050    hydrALAZINE (APRESOLINE) injection 10 mg  10 mg IntraVENous Q6H PRN Steven Medrano MD   10 mg at 05/17/22 0104    aspirin chewable tablet 81 mg  81 mg Oral Daily Steven Medrano MD   81 mg at 05/17/22 0904    racepinephrine HCl (VAPONEFPRIN) 2.25 % nebulizer solution NEBU 11.25 mg  11.25 mg Nebulization Q4H PRN Steven Medrano MD   11.25 mg at 05/03/22 1408    sodium chloride nebulizer 0.9 % solution 3 mL  3 mL Nebulization Q4H PRN Steven Medrano MD        propofol injection  5-50 mcg/kg/min IntraVENous Continuous Steven Medrano MD 7.2 mL/hr at 05/17/22 0801 20 mcg/kg/min at 05/17/22 0801    heparin (porcine) injection 5,000 Units  5,000 Units SubCUTAneous TID Steven Medrano MD   5,000 Units at 05/17/22 0900    Peppermint Spirit SPRT   Does not apply PRN Steven Medrano MD       Connye Sole menthol-zinc oxide (CALMOSEPTINE) 0.44-20.6 % ointment   Topical BID Azeb Martin MD   Given at 05/17/22 2855    sodium phosphate 10.5 mmol in sodium chloride 0.9 % 250 mL IVPB  0.16 mmol/kg IntraVENous PRN Stiven Cabezas MD        Or    sodium phosphate 21 mmol in sodium chloride 0.9 % 250 mL IVPB  0.32 mmol/kg IntraVENous PRN Stiven Cabezas MD        hydrALAZINE (APRESOLINE) tablet 100 mg  100 mg Oral 3 times per day Stiven Cabezas MD   100 mg at 05/17/22 0520    glucagon (rDNA) injection 1 mg  1 mg IntraMUSCular PRN Stiven Cabezas MD        dextrose 5 % solution  100 mL/hr IntraVENous PRN Stiven Cabezas MD        glucose chewable tablet 16 g  4 tablet Oral PRN Stiven Cabezas MD        dextrose bolus (hypoglycemia) 10% 125 mL  125 mL IntraVENous PRN Stiven Cabezas MD   Stopped at 05/11/22 2016    Or    dextrose bolus (hypoglycemia) 10% 250 mL  250 mL IntraVENous PRN Stiven Cabezas MD   Stopped at 05/10/22 2026    LORazepam (ATIVAN) injection 1 mg  1 mg IntraVENous Q5 Min PRN Stiven Cabezas MD        ondansetron (ZOFRAN-ODT) disintegrating tablet 4 mg  4 mg Oral Q8H PRN Stiven Cabezas MD        Or    ondansetron [facility]) injection 4 mg  4 mg IntraVENous Q6H PRN Stiven Cabezas MD        polyethylene glycol [facility]) packet 17 g  17 g Oral Daily PRN Stiven Cabezas MD        acetaminophen (TYLENOL) tablet 650 mg  650 mg Oral Q6H PRN Stiven Cabezas MD   650 mg at 04/25/22 0035    Or    acetaminophen (TYLENOL) suppository 650 mg  650 mg Rectal Q6H PRN Stiven Cabezas MD        chlorhexidine (PERIDEX) 0.12 % solution 15 mL  15 mL Mouth/Throat BID Stiven Cabezas MD   15 mL at 05/17/22 0905    famotidine (PEPCID) 20 mg in sodium chloride (PF) 10 mL injection  20 mg IntraVENous Daily Stiven Cabezas MD   20 mg at 05/17/22 0900    magic butt cream   Topical Q4H PRN Stiven Cabezas MD   Given at 05/03/22 1395    magnesium sulfate 1000 mg in dextrose 5% 100 mL IVPB  1,000 mg IntraVENous PRN Hayden Camarillo MD        potassium bicarb-citric acid (EFFER-K) effervescent tablet 40 mEq  40 mEq Oral PRN Hayden Camarillo MD        Or    potassium chloride 10 mEq/100 mL IVPB (Peripheral Line)  10 mEq IntraVENous PRN Hayden Camarillo MD   Stopped at 04/25/22 8988       Past Medical History:  Past Medical History:   Diagnosis Date    Acute ischemic stroke ([facility] 4/30/2022    Arthritis     Chronic kidney disease, stage 5, kidney failure (HCC)     Closed fracture of right shoulder girdle, with routine healing, subsequent encounter     DM (diabetes mellitus) type II controlled with renal manifestation ([facility] 06/1993    Gastroparesis     GERD (gastroesophageal reflux disease)     Hemodialysis patient ([facility]     dialysis on tues, thur, and sat [facility]    Hypertension     Hypothyroid     Nasal congestion     recent    Neuropathy     Noncompliance with medications     Palliative care patient 10/08/2019    Restless leg syndrome        Past Surgical History:  Past Surgical History:   Procedure Laterality Date    BREAST SURGERY Left 2008    infected milk gland removed    BREAST SURGERY Right 5/25/2021    WEDGE EXCISION RIGHT BREAST DUCT WITH LACRIMAL DUCT PROBE, PEC BLOCK performed by Elizabeth Acosta MD at [address] [place] [address], LAPAROSCOPIC      2008    COLONOSCOPY Left 9/17/2020    Dr Kim Walton hepatic flexure-Severe tortuosity with severe spasming, 1 yr recall    DIALYSIS FISTULA CREATION Left 1/25/2019    REVISION LEFT UPPER EXTREMITY AV ACCESS WITH LEFT BRACHIAL ARTERY TO LEFT AXILLARY VEIN WITH  INTERPOSITIONAL ARTEGRAFT   performed by Kyle Barrientos MD at [address]  2012    GASTROSTOMY TUBE PLACEMENT N/A 5/10/2022    PERCUTANEOUS ENDOSCOPIC GASTROSTOMY TUBE PLACEMENT, performed by Hayden Camarillo MD at [facility] [address] Right 1/25/2019    INSERTION OF RIGHT INTERNAL JUGULAR HEMODIALYSIS CATHETER performed by Kyle Barrientos MD at [facility] Stomach Cancer Sister     Breast Cancer Sister 27    Depression Daughter 25        committed suicide    Liver Cancer Neg Hx     Liver Disease Neg Hx     Esophageal Cancer Neg Hx     Rectal Cancer Neg Hx        Social History  Social History     Socioeconomic History    Marital status: Single     Spouse name: Not on file    Number of children: 2    Years of education: Not on file    Highest education level: Not on file   Occupational History    Not on file   Tobacco Use    Smoking status: Current Every Day Smoker     Packs/day: 0.50     Years: 33.00     Pack years: 16.50     Types: Cigarettes    Smokeless tobacco: Never Used    Tobacco comment: NOW at 1/4 PD, started at age 24 and has averaged 2 PPD   Vaping Use    Vaping Use: Never used   Substance and Sexual Activity    Alcohol use: No    Drug use: Not Currently     Types: Marijuana Delia Winslow)    Sexual activity: Not Currently     Partners: Male   Other Topics Concern    Not on file   Social History Narrative    Not on file     Social Determinants of Health     Financial Resource Strain:     Difficulty of Paying Living Expenses: Not on file   Food Insecurity:     Worried About Running Out of Food in the Last Year: Not on file    Kendy of Food in the Last Year: Not on file   Transportation Needs:     Lack of Transportation (Medical): Not on file    Lack of Transportation (Non-Medical):  Not on file   Physical Activity:     Days of Exercise per Week: Not on file    Minutes of Exercise per Session: Not on file   Stress:     Feeling of Stress : Not on file   Social Connections:     Frequency of Communication with Friends and Family: Not on file    Frequency of Social Gatherings with Friends and Family: Not on file    Attends Jewish Services: Not on file    Active Member of Clubs or Organizations: Not on file    Attends Club or Organization Meetings: Not on file    Marital Status: Not on file   Intimate Partner Violence:     Fear of Current or Ex-Partner: Not on file    Emotionally Abused: Not on file    Physically Abused: Not on file    Sexually Abused: Not on file   Housing Stability:     Unable to Pay for Housing in the Last Year: Not on file    Number of Places Lived in the Last Year: Not on file    Unstable Housing in the Last Year: Not on file         Review of Systems:  Review of system is unobtainable as she is currently on a ventilator and poor neurological response.       Objective:  Patient Vitals for the past 24 hrs:   BP Temp Temp src Pulse Resp SpO2   05/17/22 0800 (!) 161/80 100.2 °F (37.9 °C) Temporal 93 16 96 %   05/17/22 0751 (!) 181/59 100.2 °F (37.9 °C) Temporal 91 14 97 %   05/17/22 0700 (!) 172/53   92 14 96 %   05/17/22 0645  100.2 °F (37.9 °C) Temporal      05/17/22 0630  99.8 °F (37.7 °C) Temporal      05/17/22 0620    92 14 97 %   05/17/22 0615 (!) 162/54 99 °F (37.2 °C) Temporal      05/17/22 0600 (!) 209/72 99.9 °F (37.7 °C) Temporal 94 14 96 %   05/17/22 0545  99.7 °F (37.6 °C) Temporal      05/17/22 0515 (!) 203/66 100.4 °F (38 °C) Temporal 97 14 97 %   05/17/22 0505 (!) 203/66 100.4 °F (38 °C) Temporal 97 14 97 %   05/17/22 0500 (!) 187/62 100.2 °F (37.9 °C) Temporal 97 16 97 %   05/17/22 0458 (!) 167/68 98.2 °F (36.8 °C) Temporal 95 14 97 %   05/17/22 0400 (!) 169/64 97.9 °F (36.6 °C) Temporal 95 14 97 %   05/17/22 0300 (!) 169/60   99 16 96 %   05/17/22 0212    92 14 98 %   05/17/22 0200 (!) 171/62   93 15 97 %   05/17/22 0104 (!) 186/67        05/17/22 0100 (!) 186/67   93 14 97 %   05/17/22 0000 (!) 155/63 99.2 °F (37.3 °C) Temporal 92 14 98 %   05/16/22 2300 (!) 148/61   94 14 98 %   05/16/22 2212    91 14 98 %   05/16/22 2202 138/62        05/16/22 2200 138/62   93 14 98 %   05/16/22 2100 (!) 144/62   94 14 100 %   05/16/22 2000  100 °F (37.8 °C) Temporal 95 14 100 %   05/16/22 1900 133/61   102 14 100 %   05/16/22 1800 (!) 108/50   96 14 100 % 05/16/22 1700 (!) 112/52   96 14 100 %   05/16/22 1600 (!) 104/50 99.9 °F (37.7 °C) Temporal 96 14 100 %   05/16/22 1500 (!) 100/49   97 14 97 %   05/16/22 1400 (!) 120/56   93 16 94 %   05/16/22 1300 (!) 154/61   88 12 (!) 89 %   05/16/22 1200 (!) 143/57 99.9 °F (37.7 °C) Temporal 87 14 98 %   05/16/22 1100 137/60   84 28 (!) 87 %   05/16/22 1032    88 26 (!) 85 %   05/16/22 1000 (!) 109/53   89 17 98 %       Intake/Output Summary (Last 24 hours) at 5/17/2022 0912  Last data filed at 5/17/2022 0801  Gross per 24 hour   Intake 1320.74 ml   Output 3200 ml   Net -1879.26 ml     General: Sleepy/remains on ventilator via tracheostomy. HEENT: Normocephalic atraumatic head  Neck: Supple without JVD or carotid bruits/midline tracheostomy connected to ventilator. Chest: Bilateral clear breath sounds. CVS: Regular rate and rhythm S1 and S2. Abdominal: Soft and nondistended positive bowel sounds. Extremities: No pedal edema. Skin: warm and dry without rash    Labs:  BMP:   Recent Labs     05/15/22  0154 05/16/22 0454 05/17/22  0132   * 130* 134*   K 5.5* 6.7* 4.2   CL 89* 85* 93*   CO2 21* 19* 24   PHOS 7.5* 8.8* 5.0*   BUN 62* 89* 47*   CREATININE 3.5* 4.4* 2.3*   CALCIUM 9.3 9.3 8.8     CBC:   Recent Labs     05/15/22  0154 05/15/22  0906 05/16/22  0454 05/17/22  0132 05/17/22  0242   WBC 10.9*  --  11.5* 9.0  --    HGB 6.7*   < > 8.1* 6.6* 6.8*   HCT 22.2*   < > 27.2* 22.0* 23.0*   MCV 93.7  --  96.5 96.5  --      --  329 267  --     < > = values in this interval not displayed. LIVER PROFILE:   Recent Labs     05/15/22  0154 05/16/22  0454 05/17/22  0132   AST 13 13 11   ALT 7 8 7   BILITOT 0.3 0.4 0.3   ALKPHOS 98 114* 109*     PT/INR:   Recent Labs     05/15/22  0154 05/16/22  0454 05/17/22  0132   PROTIME 14.6 15.1* 15.4*   INR 1.14 1.19* 1.22*     APTT: No results for input(s): APTT in the last 72 hours. BNP:  No results for input(s): BNP in the last 72 hours.   Ionized Calcium:No results for input(s): IONCA in the last 72 hours. Magnesium:  Recent Labs     05/15/22  0154 05/16/22 0454 05/17/22 0132   MG 2.5 2.7* 2.3     Phosphorus:  Recent Labs     05/15/22  0154 05/16/22 0454 05/17/22 0132   PHOS 7.5* 8.8* 5.0*     HgbA1C:   No results for input(s): LABA1C in the last 72 hours. Hepatic:   Recent Labs     05/15/22  0154 05/16/22 0454 05/17/22 0132   ALKPHOS 98 114* 109*   ALT 7 8 7   AST 13 13 11   PROT 6.2* 6.0* 5.6*   BILITOT 0.3 0.4 0.3   LABALBU 2.6* 2.9* 2.8*     Lactic Acid: No results for input(s): LACTA in the last 72 hours. Troponin: No results for input(s): CKTOTAL, CKMB, TROPONINT in the last 72 hours. ABGs:   Lab Results   Component Value Date    PHART 7.540 05/04/2022    PO2ART 119.0 05/04/2022    VUV2EGX 36.0 05/04/2022     CRP:  No results for input(s): CRP in the last 72 hours. Sed Rate:  No results for input(s): SEDRATE in the last 72 hours. Culture Results:   Blood Culture Recent:   Recent Labs     04/24/22  1447   BC No growth after 5 days of incubation. Urine Culture Recent :   Recent Labs     04/24/22  1354   LABURIN No growth at 36-48 hours       Radiology reports as per the Radiologist  Radiology: CT Head WO Contrast    Result Date: 4/24/2022  CT HEAD WO CONTRAST 4/24/2022 2:01 PM HISTORY: Altered mental status COMPARISON: CT scan dated 11/18/2021 DOSE LENGTH PRODUCT: 766 mGy cm TECHNIQUE: Helical tomographic images of the brain were obtained without the use of intravenous contrast. Automated exposure control was also utilized to decrease patient radiation dose. FINDINGS: There is no evidence of evolving large vascular territory infarct. No visualized intra-axial or extra-axial hemorrhage. No mass lesion is identified. Normal size and configuration of the ventricular system. The basal cisterns are symmetric. Posterior fossa structures are unremarkable. The included orbits and their contents are unremarkable.  Chronic paranasal sinus disease on the left. The visualized osseous structures and overlying soft tissues of the skull and face are unremarkable. 1. No acute intracranial process. Signed by Dr Kiarra Erazo    Result Date: 4/24/2022  XR CHEST PORTABLE 4/24/2022 4:27 PM HISTORY: ET tube, enteric tube  Technique: Single AP view of the chest COMPARISONS: 4/24/2022 FINDINGS: Endotracheal tube is identified with tip essentially touching the krysta. Enteric tube courses into the stomach with tip curled in the fundus back towards the gastroesophageal junction. The lungs are clear and well expanded. Heart size is stable. Pulmonary vasculature are nondilated. No pleural effusion or pneumothorax. 1. Endotracheal tube is deep, tip essentially touching the krysta. Lungs are clear and well expanded. I would recommend 2-3 cm withdrawal for a more optimal positioning. The results of this exam were discussed with Loly Geronimo (ICU nursing) on 4/24/2022 at 1640 hours Signed by Dr Isai Myers    XR CHEST PORTABLE    Result Date: 4/24/2022  XR CHEST PORTABLE 4/24/2022 1:17 PM HISTORY: ET tube placement  Technique: Single AP view of the chest COMPARISONS: 4/24/2022 FINDINGS: Status post endotracheal intubation. ET tube is in good position. Lungs are well-expanded. Enteric tube curls on itself in the midesophagus and then extends back up into the throat. Heart size is stable. Pulmonary vasculature are nondilated. 1. ET tube is in good position. Lungs are clear and well expanded. 2. Enteric tube curls on itself in the mid esophagus before extending back up into the throat. This will need repositioned. The results of this exam were discussed with Dr. Francesco Cunha on 4/24/2022 at 1329 hours Signed by Dr Isai Myers    XR CHEST PORTABLE    Result Date: 4/24/2022  XR CHEST PORTABLE 4/24/2022 12:34 PM HISTORY: Altered mental status  Technique: Single AP view of the chest COMPARISONS: Chest exam dated 4/1/2022 FINDINGS: No lung consolidation. No pleural effusion or pneumothorax. Cardiomediastinal silhouette and pulmonary vasculature are unremarkable. No acute bony abnormality. Left subclavian region vascular stent with additional stent projecting over the left humerus. No acute cardiopulmonary process. Signed by Dr Yasmine Solis    Result Date: 4/1/2022  XR CHEST PORTABLE 4/1/2022 12:35 PM HISTORY: Shortness of breath  Technique: Single AP view of the chest COMPARISONS: Chest exam dated 1/22/2022 FINDINGS: Cardiomegaly with central pulmonary vascular congestion, interstitial and alveolar edema as well as bilateral moderate layering pleural effusions. No pneumothorax. No acute bony abnormality. 1. Volume overload with interstitial and carie pulmonary edema as well as bilateral moderate pleural effusions. Signed by Dr Aniya Trevizo   1. End-stage renal disease/hemodialysis dependent. 2.  Type II diabetic nephropathy. 3.  Acute on chronic respiratory failure, trach and vented. 4.  Status post grand mal seizure  5. Severe hyponatremia due to volume overload. 6.  Anemia of chronic kidney disease. 7.  Urinary tract infection. 8.  Pulmonary edema  9. Hypokalemia  10. Acute ischemic brain infarct. Plan:  1. Routine dialysis Monday Wednesday Friday. 2.  Possible discharge to long-term acute care hospital soon.   3.  Continue supportive care including tube feeding and vent support      Hemal Mcnulty MD  05/17/22  9:12 AM

## 2022-05-17 NOTE — CARE COORDINATION
Spoke with Pam Resendiz at [employer] who is excalating appeal through insurance review this afternoon. SW provided timeline of pt admission, initial LTACH services referral, first denial, trach/peg placement, second denial to current. Awaiting further response at this time.

## 2022-05-17 NOTE — PROGRESS NOTES
[facility] Pharmacokinetic Monitoring Service - Vancomycin    Consulting Provider: Dr. Victoriano Juarez  Indication: VAP  Target Concentration: Pre-Dialysis Concentration 21-24 mg/L  Day of Therapy: 9  Additional Antimicrobials: Garamycin    Pertinent Laboratory Values: Wt Readings from Last 1 Encounters:   05/16/22 134 lb (60.8 kg)     Temp Readings from Last 1 Encounters:   05/16/22 99.9 °F (37.7 °C) (Temporal)     Recent Labs     05/15/22  0154 05/16/22  0454   CREATININE 3.5* 4.4*   WBC 10.9* 11.5*       Pertinent Cultures:  Culture Date Source Results   05/06/22 Respiratory MRSA; Raoultella ornithinolytica; Proteus vulgaris   MRSA Nasal Swab: showed MRSA positive result on 05/09    Recent vancomycin administrations                     vancomycin (VANCOCIN) 750 mg in dextrose 5 % 250 mL IVPB (mg) 750 mg New Bag 05/16/22 1654                    Assessment:  Date/Time Current Dose Concentration Dialysis Session   5/16 Pulse dose 24.2 MWF     Plan:  Concentration-guided dosing due to renal impairment and intermittent hemodialysis   Current dosing regimen  is therapeutic   Continue current dose of Vancomycin 750 mg IV x 1 dose after HD today.   Vancomycin concentration ordered for 5/18 @ 0400, prior to HD session   Pharmacy will continue to monitor patient and adjust therapy as indicated    Thank you for the consult,ROXANNE Estrada [facility], BCPS  5/16/2022 8:20 PM

## 2022-05-18 LAB
ALBUMIN SERPL-MCNC: 2.7 G/DL (ref 3.5–5.2)
ALP BLD-CCNC: 105 U/L (ref 35–104)
ALT SERPL-CCNC: <5 U/L (ref 5–33)
ANION GAP SERPL CALCULATED.3IONS-SCNC: 19 MMOL/L (ref 7–19)
AST SERPL-CCNC: 8 U/L (ref 5–32)
BASOPHILS ABSOLUTE: 0.1 K/UL (ref 0–0.2)
BASOPHILS RELATIVE PERCENT: 0.5 % (ref 0–1)
BILIRUB SERPL-MCNC: 0.4 MG/DL (ref 0.2–1.2)
BUN BLDV-MCNC: 67 MG/DL (ref 6–20)
CALCIUM SERPL-MCNC: 9.3 MG/DL (ref 8.6–10)
CHLORIDE BLD-SCNC: 90 MMOL/L (ref 98–111)
CO2: 24 MMOL/L (ref 22–29)
CREAT SERPL-MCNC: 3 MG/DL (ref 0.5–0.9)
EOSINOPHILS ABSOLUTE: 0.2 K/UL (ref 0–0.6)
EOSINOPHILS RELATIVE PERCENT: 1.3 % (ref 0–5)
GENTAMICIN DOSE AMOUNT: NORMAL
GENTAMICIN RANDOM: 5.6 UG/ML
GFR AFRICAN AMERICAN: 20
GFR NON-AFRICAN AMERICAN: 16
GLUCOSE BLD-MCNC: 248 MG/DL (ref 70–99)
GLUCOSE BLD-MCNC: 324 MG/DL (ref 70–99)
GLUCOSE BLD-MCNC: 325 MG/DL (ref 70–99)
GLUCOSE BLD-MCNC: 328 MG/DL (ref 74–109)
GLUCOSE BLD-MCNC: 376 MG/DL (ref 70–99)
HCT VFR BLD CALC: 26.9 % (ref 37–47)
HEMOGLOBIN: 8.2 G/DL (ref 12–16)
IMMATURE GRANULOCYTES #: 0 K/UL
INR BLD: 1.09 (ref 0.88–1.18)
LYMPHOCYTES ABSOLUTE: 0.6 K/UL (ref 1.1–4.5)
LYMPHOCYTES RELATIVE PERCENT: 5.2 % (ref 20–40)
MAGNESIUM: 2.5 MG/DL (ref 1.6–2.6)
MCH RBC QN AUTO: 29 PG (ref 27–31)
MCHC RBC AUTO-ENTMCNC: 30.5 G/DL (ref 33–37)
MCV RBC AUTO: 95.1 FL (ref 81–99)
MONOCYTES ABSOLUTE: 0.7 K/UL (ref 0–0.9)
MONOCYTES RELATIVE PERCENT: 6 % (ref 0–10)
NEUTROPHILS ABSOLUTE: 10.1 K/UL (ref 1.5–7.5)
NEUTROPHILS RELATIVE PERCENT: 86.7 % (ref 50–65)
PDW BLD-RTO: 17.4 % (ref 11.5–14.5)
PERFORMED ON: ABNORMAL
PHOSPHORUS: 5.8 MG/DL (ref 2.5–4.5)
PLATELET # BLD: 234 K/UL (ref 130–400)
PMV BLD AUTO: 9.5 FL (ref 9.4–12.3)
POTASSIUM SERPL-SCNC: 4.8 MMOL/L (ref 3.5–5)
PROTHROMBIN TIME: 14.1 SEC (ref 12–14.6)
RBC # BLD: 2.83 M/UL (ref 4.2–5.4)
SODIUM BLD-SCNC: 133 MMOL/L (ref 136–145)
TOTAL PROTEIN: 6.4 G/DL (ref 6.6–8.7)
VANCOMYCIN RANDOM: 21 UG/ML
WBC # BLD: 11.6 K/UL (ref 4.8–10.8)

## 2022-05-18 PROCEDURE — 99231 SBSQ HOSP IP/OBS SF/LOW 25: CPT

## 2022-05-18 PROCEDURE — 2500000003 HC RX 250 WO HCPCS: Performed by: SURGERY

## 2022-05-18 PROCEDURE — 6370000000 HC RX 637 (ALT 250 FOR IP): Performed by: PSYCHIATRY & NEUROLOGY

## 2022-05-18 PROCEDURE — 6370000000 HC RX 637 (ALT 250 FOR IP): Performed by: SURGERY

## 2022-05-18 PROCEDURE — 6360000002 HC RX W HCPCS: Performed by: SURGERY

## 2022-05-18 PROCEDURE — 2580000003 HC RX 258: Performed by: INTERNAL MEDICINE

## 2022-05-18 PROCEDURE — 82947 ASSAY GLUCOSE BLOOD QUANT: CPT

## 2022-05-18 PROCEDURE — 2580000003 HC RX 258: Performed by: SURGERY

## 2022-05-18 PROCEDURE — 85610 PROTHROMBIN TIME: CPT

## 2022-05-18 PROCEDURE — 2700000000 HC OXYGEN THERAPY PER DAY

## 2022-05-18 PROCEDURE — 6360000002 HC RX W HCPCS: Performed by: INTERNAL MEDICINE

## 2022-05-18 PROCEDURE — 85025 COMPLETE CBC W/AUTO DIFF WBC: CPT

## 2022-05-18 PROCEDURE — 94640 AIRWAY INHALATION TREATMENT: CPT

## 2022-05-18 PROCEDURE — 84100 ASSAY OF PHOSPHORUS: CPT

## 2022-05-18 PROCEDURE — 99232 SBSQ HOSP IP/OBS MODERATE 35: CPT | Performed by: PSYCHIATRY & NEUROLOGY

## 2022-05-18 PROCEDURE — 80202 ASSAY OF VANCOMYCIN: CPT

## 2022-05-18 PROCEDURE — 36415 COLL VENOUS BLD VENIPUNCTURE: CPT

## 2022-05-18 PROCEDURE — 80170 ASSAY OF GENTAMICIN: CPT

## 2022-05-18 PROCEDURE — 6370000000 HC RX 637 (ALT 250 FOR IP): Performed by: INTERNAL MEDICINE

## 2022-05-18 PROCEDURE — 94003 VENT MGMT INPAT SUBQ DAY: CPT

## 2022-05-18 PROCEDURE — 2000000000 HC ICU R&B

## 2022-05-18 PROCEDURE — 83735 ASSAY OF MAGNESIUM: CPT

## 2022-05-18 PROCEDURE — 2580000003 HC RX 258: Performed by: ANESTHESIOLOGY

## 2022-05-18 PROCEDURE — 80053 COMPREHEN METABOLIC PANEL: CPT

## 2022-05-18 PROCEDURE — 8010000000 HC HEMODIALYSIS ACUTE INPT

## 2022-05-18 RX ORDER — INSULIN GLARGINE 100 [IU]/ML
8 INJECTION, SOLUTION SUBCUTANEOUS NIGHTLY
Status: DISCONTINUED | OUTPATIENT
Start: 2022-05-18 | End: 2022-05-25 | Stop reason: HOSPADM

## 2022-05-18 RX ADMIN — SEVELAMER CARBONATE 0.8 G: 800 POWDER, FOR SUSPENSION ORAL at 16:33

## 2022-05-18 RX ADMIN — VANCOMYCIN HYDROCHLORIDE 750 MG: 750 INJECTION, POWDER, LYOPHILIZED, FOR SOLUTION INTRAVENOUS at 14:27

## 2022-05-18 RX ADMIN — LEVOTHYROXINE SODIUM 150 MCG: 75 TABLET ORAL at 08:26

## 2022-05-18 RX ADMIN — CHLORHEXIDINE GLUCONATE 15 ML: 1.2 RINSE ORAL at 08:23

## 2022-05-18 RX ADMIN — INSULIN LISPRO 3 UNITS: 100 INJECTION, SOLUTION INTRAVENOUS; SUBCUTANEOUS at 21:21

## 2022-05-18 RX ADMIN — IPRATROPIUM BROMIDE AND ALBUTEROL SULFATE 1 AMPULE: 2.5; .5 SOLUTION RESPIRATORY (INHALATION) at 14:29

## 2022-05-18 RX ADMIN — IPRATROPIUM BROMIDE AND ALBUTEROL SULFATE 1 AMPULE: 2.5; .5 SOLUTION RESPIRATORY (INHALATION) at 22:30

## 2022-05-18 RX ADMIN — ASPIRIN 81 MG 81 MG: 81 TABLET ORAL at 08:21

## 2022-05-18 RX ADMIN — CHLORHEXIDINE GLUCONATE 15 ML: 1.2 RINSE ORAL at 21:47

## 2022-05-18 RX ADMIN — ACETYLCYSTEINE 600 MG: 200 SOLUTION ORAL; RESPIRATORY (INHALATION) at 18:47

## 2022-05-18 RX ADMIN — LEVETIRACETAM 500 MG: 500 SOLUTION ORAL at 08:21

## 2022-05-18 RX ADMIN — HYDRALAZINE HYDROCHLORIDE 100 MG: 50 TABLET, FILM COATED ORAL at 14:49

## 2022-05-18 RX ADMIN — HEPARIN SODIUM 5000 UNITS: 5000 INJECTION INTRAVENOUS; SUBCUTANEOUS at 21:47

## 2022-05-18 RX ADMIN — COLLAGENASE SANTYL: 250 OINTMENT TOPICAL at 08:27

## 2022-05-18 RX ADMIN — DULOXETINE 60 MG: 60 CAPSULE, DELAYED RELEASE ORAL at 08:21

## 2022-05-18 RX ADMIN — PROPOFOL 15 MCG/KG/MIN: 10 INJECTION, EMULSION INTRAVENOUS at 05:08

## 2022-05-18 RX ADMIN — Medication 10 ML: at 08:28

## 2022-05-18 RX ADMIN — ANORECTAL OINTMENT: 15.7; .44; 24; 20.6 OINTMENT TOPICAL at 05:35

## 2022-05-18 RX ADMIN — LEVETIRACETAM 500 MG: 500 SOLUTION ORAL at 21:13

## 2022-05-18 RX ADMIN — IPRATROPIUM BROMIDE AND ALBUTEROL SULFATE 1 AMPULE: 2.5; .5 SOLUTION RESPIRATORY (INHALATION) at 02:45

## 2022-05-18 RX ADMIN — HEPARIN SODIUM 5000 UNITS: 5000 INJECTION INTRAVENOUS; SUBCUTANEOUS at 08:22

## 2022-05-18 RX ADMIN — ACETYLCYSTEINE 600 MG: 200 SOLUTION ORAL; RESPIRATORY (INHALATION) at 06:56

## 2022-05-18 RX ADMIN — SODIUM CHLORIDE, PRESERVATIVE FREE 20 MG: 5 INJECTION INTRAVENOUS at 08:22

## 2022-05-18 RX ADMIN — HYDRALAZINE HYDROCHLORIDE 100 MG: 50 TABLET, FILM COATED ORAL at 21:14

## 2022-05-18 RX ADMIN — HEPARIN SODIUM 5000 UNITS: 5000 INJECTION INTRAVENOUS; SUBCUTANEOUS at 15:44

## 2022-05-18 RX ADMIN — ATORVASTATIN CALCIUM 20 MG: 20 TABLET, FILM COATED ORAL at 08:21

## 2022-05-18 RX ADMIN — INSULIN LISPRO 5 UNITS: 100 INJECTION, SOLUTION INTRAVENOUS; SUBCUTANEOUS at 08:19

## 2022-05-18 RX ADMIN — INSULIN LISPRO 4 UNITS: 100 INJECTION, SOLUTION INTRAVENOUS; SUBCUTANEOUS at 16:24

## 2022-05-18 RX ADMIN — SEVELAMER CARBONATE 0.8 G: 800 POWDER, FOR SUSPENSION ORAL at 12:21

## 2022-05-18 RX ADMIN — HYDRALAZINE HYDROCHLORIDE 100 MG: 50 TABLET, FILM COATED ORAL at 05:00

## 2022-05-18 RX ADMIN — ANORECTAL OINTMENT: 15.7; .44; 24; 20.6 OINTMENT TOPICAL at 18:09

## 2022-05-18 RX ADMIN — INSULIN GLARGINE 8 UNITS: 100 INJECTION, SOLUTION SUBCUTANEOUS at 21:21

## 2022-05-18 RX ADMIN — IPRATROPIUM BROMIDE AND ALBUTEROL SULFATE 1 AMPULE: 2.5; .5 SOLUTION RESPIRATORY (INHALATION) at 18:47

## 2022-05-18 RX ADMIN — SEVELAMER CARBONATE 0.8 G: 800 POWDER, FOR SUSPENSION ORAL at 08:22

## 2022-05-18 RX ADMIN — HYDRALAZINE HYDROCHLORIDE 10 MG: 20 INJECTION INTRAMUSCULAR; INTRAVENOUS at 16:17

## 2022-05-18 RX ADMIN — IPRATROPIUM BROMIDE AND ALBUTEROL SULFATE 1 AMPULE: 2.5; .5 SOLUTION RESPIRATORY (INHALATION) at 10:32

## 2022-05-18 RX ADMIN — IPRATROPIUM BROMIDE AND ALBUTEROL SULFATE 1 AMPULE: 2.5; .5 SOLUTION RESPIRATORY (INHALATION) at 06:56

## 2022-05-18 RX ADMIN — INSULIN LISPRO 2 UNITS: 100 INJECTION, SOLUTION INTRAVENOUS; SUBCUTANEOUS at 12:20

## 2022-05-18 RX ADMIN — ROPINIROLE HYDROCHLORIDE 4 MG: 4 TABLET, FILM COATED ORAL at 21:14

## 2022-05-18 RX ADMIN — PROPOFOL 15 MCG/KG/MIN: 10 INJECTION, EMULSION INTRAVENOUS at 16:27

## 2022-05-18 ASSESSMENT — PULMONARY FUNCTION TESTS
PIF_VALUE: 19
PIF_VALUE: 20
PIF_VALUE: 19
PIF_VALUE: 19
PIF_VALUE: 18
PIF_VALUE: 20
PIF_VALUE: 28
PIF_VALUE: 18
PIF_VALUE: 18
PIF_VALUE: 23
PIF_VALUE: 17
PIF_VALUE: 27
PIF_VALUE: 21
PIF_VALUE: 18
PIF_VALUE: 23
PIF_VALUE: 27
PIF_VALUE: 23
PIF_VALUE: 27
PIF_VALUE: 20
PIF_VALUE: 21
PIF_VALUE: 27
PIF_VALUE: 19
PIF_VALUE: 30
PIF_VALUE: 19
PIF_VALUE: 25
PIF_VALUE: 19
PIF_VALUE: 18
PIF_VALUE: 18
PIF_VALUE: 20

## 2022-05-18 ASSESSMENT — PAIN SCALES - GENERAL
PAINLEVEL_OUTOF10: 0
PAINLEVEL_OUTOF10: 0

## 2022-05-18 NOTE — PROGRESS NOTES
Nephrology ([facility]) Progress Note    Patient:  Radhames Portillo  YOB: 1970  Date of Service: 5/18/2022  MRN: [id number]   Acct: [de-identified]   Primary Care Physician: ROMINA Thomas  Advance Directive: Full Code  Admit Date: 4/24/2022       Hospital Day: 24  Referring Provider: Kim Bobby MD    Patient independently seen and examined, Chart, Consults, Notes, Operative notes, Labs, Cardiology, and Radiology studies reviewed as available. Chief complaint: Altered mental status/end-stage renal disease    Subjective:  Radhames Portillo is a 47 y.o. female for whom we were consulted for evaluation and treatment of end-stage renal disease. Patient also has history of seizure disorder, type 2 diabetes, hypertension, frequent hospitalization with noncompliance and poorly controlled diabetes. Patient was brought to the emergency room after she was found to have unresponsiveness and witnessed grand mal seizure. She was brought in by ambulance. On arrival in the emergency room she was found to be severely hyperglycemic and had a urinary tract infection. Patient was intubated, sedated and currently being treated by neurology for grand mal seizure. She is also receiving IV antibiotics and insulin drip. Her blood sugar control has significantly improved. On April 25, she has received emergent hemodialysis treatment for correction of volume status as well as hyponatremia. She is now moved from ICU to CCU. Recent MRI of brain showed Ischemic brain infarct. Recently patient underwent tracheotomy as she failed to wean off of the ventilator. She also had PEG placement. She is now waiting to go to long-term acute [facility] hospital.  She has received routine hemodialysis treatment yesterday    This morning patient remained on ventilator via tracheotomy. She has poor neurological response.   She is waiting to go to long-term acute [facility] hospital.    Currently seen on hemodialysis  Hemodialysis access: AV fistula  Hemodialysis: 3-1/2-hour  Ultrafiltration: 3000 cc nine 2K bath  Blood flow rate is 450 cc/min.       Allergies:  Penicillins, Lamisil advanced [terbinafine], Reglan [metoclopramide], and Stadol [butorphanol]    Medicines:  Current Facility-Administered Medications   Medication Dose Route Frequency Provider Last Rate Last Admin    vancomycin (VANCOCIN) 750 mg in dextrose 5 % 250 mL IVPB  750 mg IntraVENous Once Polylong Chio, DO        0.9 % sodium chloride infusion   IntraVENous PRN Lucy Ventura MD        sodium zirconium cyclosilicate (LOKELMA) oral suspension 10 g  10 g Oral TID PRN Meghan Foster MD        insulin glargine (LANTUS) injection vial 5 Units  5 Units SubCUTAneous Nightly Kaylie Damian MD   5 Units at 05/17/22 1346    sevelamer (RENVELA) packet 0.8 g  0.8 g Oral TID WC Kaylie Damian MD   0.8 g at 05/18/22 0148    amLODIPine (NORVASC) tablet 10 mg  10 mg Oral Daily Patience Walter, DO   10 mg at 05/18/22 0821    insulin lispro (HUMALOG) injection vial 0-6 Units  0-6 Units SubCUTAneous TID GIANNA Damian MD   5 Units at 05/18/22 0819    insulin lispro (HUMALOG) injection vial 0-3 Units  0-3 Units SubCUTAneous Nightly Kaylie Damian MD   3 Units at 05/17/22 2053    levETIRAcetam (KEPPRA) 100 MG/ML solution 500 mg  500 mg Per G Tube BID Carley Veliz MD   500 mg at 05/18/22 9132    atorvastatin (LIPITOR) tablet 20 mg  20 mg Oral Daily Kaylie Damian MD   20 mg at 05/18/22 8172    rOPINIRole (REQUIP) tablet 4 mg  4 mg Oral Nightly Kaylie Damian MD   4 mg at 05/17/22 2053    NIFEdipine (PROCARDIA XL) extended release tablet 90 mg  90 mg Oral Daily Kaylie Damian MD   90 mg at 05/18/22 1382    metoprolol succinate (TOPROL XL) extended release tablet 100 mg  100 mg Oral QAM AC Kaylie Damian MD   100 mg at 05/18/22 3075    levothyroxine (SYNTHROID) tablet 150 mcg  150 mcg Oral Daily Kaylie Damian MD   150 mcg at 05/18/22 6578  isosorbide mononitrate (IMDUR) extended release tablet 120 mg  120 mg Oral Daily Laya Klein MD   120 mg at 05/18/22 9488    DULoxetine (CYMBALTA) extended release capsule 60 mg  60 mg Oral Daily Laya Klein MD   60 mg at 05/18/22 1401    collagenase ointment   Topical Daily Laya Klein MD   Given at 05/18/22 0827    sodium chloride flush 0.9 % injection 5-40 mL  5-40 mL IntraVENous 2 times per day Sydni Nolasco MD   10 mL at 05/18/22 6248    sodium chloride flush 0.9 % injection 5-40 mL  5-40 mL IntraVENous PRN Sydni Nolasco MD        0.9 % sodium chloride infusion   IntraVENous PRN Sydni Nolasco MD        HYDROmorphone HCl PF (DILAUDID) injection 0.25 mg  0.25 mg IntraVENous Q5 Min PRN Sydni Nolasco MD        HYDROmorphone HCl PF (DILAUDID) injection 0.5 mg  0.5 mg IntraVENous Q5 Min PRN Sydni Nolasco MD        vancomycin Loly Hadley) intermittent dosing (placeholder)   Other RX Minor MD Deneen        aminoglycoside intermittent dosing (placeholder)   Other RX Placeholder Reji Nolen MD        acetylcysteine (MUCOMYST) 20 % solution 600 mg  600 mg Inhalation BID Reji Nolen MD   600 mg at 05/18/22 0656    ipratropium-albuterol (DUONEB) nebulizer solution 1 ampule  1 ampule Inhalation Q4H Reji Nolen MD   1 ampule at 05/18/22 0656    hydrALAZINE (APRESOLINE) injection 10 mg  10 mg IntraVENous Q6H PRN Reji Nolen MD   10 mg at 05/17/22 2319    aspirin chewable tablet 81 mg  81 mg Oral Daily Reji Nolen MD   81 mg at 05/18/22 0821    racepinephrine HCl (VAPONEFPRIN) 2.25 % nebulizer solution NEBU 11.25 mg  11.25 mg Nebulization Q4H PRN Reji Nolen MD   11.25 mg at 05/03/22 1408    sodium chloride nebulizer 0.9 % solution 3 mL  3 mL Nebulization Q4H PRN Reji Nolen MD        propofol injection  5-50 mcg/kg/min IntraVENous Continuous Reji Nolen MD 5.4 mL/hr at 05/18/22 0508 15 mcg/kg/min at 05/18/22 0508    heparin (porcine) injection 5,000 Units  5,000 Units SubCUTAneous TID Hayden Camarillo MD   5,000 Units at 05/18/22 4331    Peppermint Spirit SPRT   Does not apply PRN Hayden Camarillo MD        menthol-zinc oxide (CALMOSEPTINE) 0.44-20.6 % ointment   Topical BID Edilma Smith MD   Given at 05/18/22 0535    sodium phosphate 10.5 mmol in sodium chloride 0.9 % 250 mL IVPB  0.16 mmol/kg IntraVENous PRN Hayden Camarillo MD        Or    sodium phosphate 21 mmol in sodium chloride 0.9 % 250 mL IVPB  0.32 mmol/kg IntraVENous PRN Hayden Camarillo MD        hydrALAZINE (APRESOLINE) tablet 100 mg  100 mg Oral 3 times per day Hayden Camarillo MD   100 mg at 05/18/22 0500    glucagon (rDNA) injection 1 mg  1 mg IntraMUSCular PRN Hayden Camarillo MD        dextrose 5 % solution  100 mL/hr IntraVENous PRN Hayden Camarillo MD        glucose chewable tablet 16 g  4 tablet Oral PRN Hayden Camarillo MD        dextrose bolus (hypoglycemia) 10% 125 mL  125 mL IntraVENous PRN Hayden Camarillo MD   Stopped at 05/11/22 2016    Or    dextrose bolus (hypoglycemia) 10% 250 mL  250 mL IntraVENous PRN Hayden Camarillo MD   Stopped at 05/10/22 2026    LORazepam (ATIVAN) injection 1 mg  1 mg IntraVENous Q5 Min PRN Hayden Camarillo MD        ondansetron (ZOFRAN-ODT) disintegrating tablet 4 mg  4 mg Oral Q8H PRN Hayden Camarillo MD        Or    ondansetron [facility]) injection 4 mg  4 mg IntraVENous Q6H PRN Hayden Camarillo MD        polyethylene glycol [facility]) packet 17 g  17 g Oral Daily PRN Hayden Camarillo MD        acetaminophen (TYLENOL) tablet 650 mg  650 mg Oral Q6H PRN Hayden Camarillo MD   650 mg at 04/25/22 0035    Or    acetaminophen (TYLENOL) suppository 650 mg  650 mg Rectal Q6H PRN Hayden Camarillo MD        chlorhexidine (PERIDEX) 0.12 % solution 15 mL  15 mL Mouth/Throat BID Hayden Camarillo MD   15 mL at 05/18/22 0823    famotidine (PEPCID) 20 mg in sodium chloride (PF) 10 mL injection  20 mg IntraVENous Daily Hayden Camarillo MD   20 mg at 05/18/22 0112  magic butt cream   Topical Q4H PRN Chris Joe MD   Given at 05/03/22 0940    magnesium sulfate 1000 mg in dextrose 5% 100 mL IVPB  1,000 mg IntraVENous PRN Chris Joe MD        potassium bicarb-citric acid (EFFER-K) effervescent tablet 40 mEq  40 mEq Oral PRN Chris Joe MD        Or    potassium chloride 10 mEq/100 mL IVPB (Peripheral Line)  10 mEq IntraVENous PRN Chris Joe MD   Stopped at 04/25/22 0391       Past Medical History:  Past Medical History:   Diagnosis Date    Acute ischemic stroke ([facility] 4/30/2022    Arthritis     Chronic kidney disease, stage 5, kidney failure (HCC)     Closed fracture of right shoulder girdle, with routine healing, subsequent encounter     DM [facility]diabetes mellitus) type II controlled [facility] 06/1993    Gastroparesis     GERD (gastroesophageal reflux disease)     Hemodialysis patient ([facility]     dialysis on tues, thur, and sat at [facility]    Hypertension     Hypothyroid     Nasal congestion     recent    Neuropathy     Noncompliance with medications     Palliative care patient 10/08/2019    Restless leg syndrome        Past Surgical History:  Past Surgical History:   Procedure Laterality Date    BREAST SURGERY Left 2008    infected milk gland removed    BREAST SURGERY Right 5/25/2021    WEDGE EXCISION RIGHT BREAST DUCT WITH LACRIMAL DUCT PROBE, PEC BLOCK performed by Jaylon Ferrell MD at [facility], LAPAROSCOPIC      2008    COLONOSCOPY Left 9/17/2020    Dr Turcios Fleeting hepatic flexure-Severe tortuosity with severe spasming, 1 yr recall    DIALYSIS FISTULA CREATION Left 1/25/2019    REVISION LEFT UPPER EXTREMITY AV ACCESS WITH LEFT BRACHIAL ARTERY TO LEFT AXILLARY VEIN WITH  INTERPOSITIONAL ARTEGRAFT   performed by Emili Martínez MD at [address]  2012    GASTROSTOMY TUBE PLACEMENT N/A 5/10/2022    PERCUTANEOUS ENDOSCOPIC GASTROSTOMY TUBE PLACEMENT, performed by Kirk Lloyd Shay Gibson MD at [address] Right 1/25/2019    INSERTION OF RIGHT INTERNAL JUGULAR HEMODIALYSIS CATHETER performed by Cindi Chen MD at [facility]  08/17/2018    1.  Moderately increased stainable iron identified by special stain in Kupffer cells and occasional hepatocytes. Negative for evidence of significant portal or lobular inflammation. Negative for evidence of significant fibrosis    [place] COLONOSCOPY W/BIOPSY SINGLE/MULTIPLE N/A 10/20/2017    Dr Nestor Vazquez prep-Tubular AP (-) dysplasia x 2--3 yr recall    [place] EGD TRANSORAL BIOPSY SINGLE/MULTIPLE N/A 12/27/2016    Dr José Luis Rodriguez EGD TRANSORAL BIOPSY SINGLE/MULTIPLE N/A 10/20/2017    Dr Lynda Shah (-)    TRACHEOSTOMY  5/10/2022    TRACHEOSTOMY PERCUTANEOUS performed by Stiven Cabezas MD at [facility] VASCULAR SURGERY Left 2016    fistula by Dr Pamela Jacobs at [address]  10/02/2017    [facility]. Left upper fistulograms/venograms, balloon angioplasty cephalic vein arch [id number] conquest.    VASCULAR SURGERY  08/2018    FISTULOGRAM    VASCULAR SURGERY  10/29/2018    [facility]. Left upper fistulograms/venograms    VASCULAR SURGERY  12/19/2018    [facility]. Arch aortogram,left upper arteriograms.  VASCULAR SURGERY  03/06/2019    [facility]. Removal of tunneled dialyis catheter right internal jugular vein.  VASCULAR SURGERY  08/28/2019    [facility]. Left upper extremity fistulogram including venography to the superior vena cava. Balloon angioplasty left subclavian vein with 10mm x 40mm conquest balloon. Balloon angioplasty mid/proximal upper arm cephalic vein with 77AO x 40mm conquest balloon. Venograms after balloon angioplasty. Stent left mid/proximal upper arm cephalic vein stenosis fluency 10mm x 60mm self-expanding covered stent. Balloon     VASCULAR SURGERY  08/28/2019    cont angioplasty stent with 10mm x 40mm conquest balloon. Completion venograms left upper extremity.        Family History  Family History Problem Relation Age of Onset    Colon Cancer Mother          at 63    Colon Polyps Mother     Lung Cancer Father          at 79    Colon Polyps Father     Stomach Cancer Sister     Breast Cancer Sister 27    Depression Daughter 25        committed suicide    Liver Cancer Neg Hx     Liver Disease Neg Hx     Esophageal Cancer Neg Hx     Rectal Cancer Neg Hx        Social History  Social History     Socioeconomic History    Marital status: Single     Spouse name: Not on file    Number of children: 2    Years of education: Not on file    Highest education level: Not on file   Occupational History    Not on file   Tobacco Use    Smoking status: Current Every Day Smoker     Packs/day: 0.50     Years: 33.00     Pack years: 16.50     Types: Cigarettes    Smokeless tobacco: Never Used    Tobacco comment: NOW at 1/4 PD, started at age 24 and has averaged 2 PPD   Vaping Use    Vaping Use: Never used   Substance and Sexual Activity    Alcohol use: No    Drug use: Not Currently     Types: Marijuana [employer])    Sexual activity: Not Currently     Partners: Male   Other Topics Concern    Not on file   Social History Narrative    Not on file     Social Determinants of Health     Financial Resource Strain:     Difficulty of Paying Living Expenses: Not on file   Food Insecurity:     Worried About Running Out of Food in the Last Year: Not on file    Kendy of Food in the Last Year: Not on file   Transportation Needs:     Lack of Transportation (Medical): Not on file    Lack of Transportation (Non-Medical):  Not on file   Physical Activity:     Days of Exercise per Week: Not on file    Minutes of Exercise per Session: Not on file   Stress:     Feeling of Stress : Not on file   Social Connections:     Frequency of Communication with Friends and Family: Not on file    Frequency of Social Gatherings with Friends and Family: Not on file    Attends [employer] Services: Not on file    Active Member of Clubs or Organizations: Not on file    Attends Club or Organization Meetings: Not on file    Marital Status: Not on file   Intimate Partner Violence:     Fear of Current or Ex-Partner: Not on file    Emotionally Abused: Not on file    Physically Abused: Not on file    Sexually Abused: Not on file   Housing Stability:     Unable to Pay for Housing in the Last Year: Not on file    Number of Nicolette in the Last Year: Not on file    Unstable Housing in the Last Year: Not on file         Review of Systems:  Review of system is unobtainable as she is currently on a ventilator and poor neurological response.       Objective:  Patient Vitals for the past 24 hrs:   BP Temp Temp src Pulse Resp SpO2   05/18/22 0826 (!) 160/59   90     05/18/22 0800 (!) 167/62 100.2 °F (37.9 °C) Temporal 90 14 97 %   05/18/22 0700 (!) 171/59   87 14 96 %   05/18/22 0656    89 14 95 %   05/18/22 0600 (!) 182/60   89 14 96 %   05/18/22 0500 (!) 176/60   89 14 97 %   05/18/22 0400 (!) 171/65 99.7 °F (37.6 °C) Temporal 88 14 95 %   05/18/22 0300 (!) 171/63   89 14 96 %   05/18/22 0246    84 14 97 %   05/18/22 0245     14 97 %   05/18/22 0200 (!) 163/60   86 14 96 %   05/18/22 0100 (!) 158/60   86 14 96 %   05/18/22 0000 (!) 169/59 99.6 °F (37.6 °C) Temporal 87 14 96 %   05/17/22 2319 (!) 178/66        05/17/22 2300 (!) 177/66   86 14 97 %   05/17/22 2226    85 12 98 %   05/17/22 2100 (!) 168/63   85 14 95 %   05/17/22 2000 (!) 162/61 99.1 °F (37.3 °C) Temporal 85 14 93 %   05/17/22 1936    82 14 94 %   05/17/22 1935     14 94 %   05/17/22 1800 (!) 149/61   82 14 96 %   05/17/22 1700 (!) 138/58   83 14 93 %   05/17/22 1600 (!) 139/59 100.1 °F (37.8 °C) Temporal 87 14 91 %   05/17/22 1500 (!) 137/50   86 14 95 %   05/17/22 1400 (!) 109/53   88 14 100 %   05/17/22 1300 (!) 125/48   90 14 90 %   05/17/22 1214    95 14 95 %   05/17/22 1200 (!) 125/49 99.9 °F (37.7 °C) Temporal 96 13 95 %   05/17/22 1100 (!) 110/44   97 14 93 %   05/17/22 1022    98 14 93 %   05/17/22 1000 (!) 120/45   99 16 94 %   05/17/22 0924    96 14 95 %   05/17/22 0900 (!) 175/58   98 14 95 %       Intake/Output Summary (Last 24 hours) at 5/18/2022 0850  Last data filed at 5/18/2022 0400  Gross per 24 hour   Intake 981.54 ml   Output 50 ml   Net 931.54 ml     General: Sleepy/remains on ventilator via tracheostomy. HEENT: Normocephalic atraumatic head  Neck: Supple without JVD or carotid bruits/midline tracheostomy connected to ventilator. Chest: Bilateral clear breath sounds. CVS: Regular rate and rhythm S1 and S2. Abdominal: Soft and nondistended positive bowel sounds. Extremities: No pedal edema. Skin: warm and dry without rash    Labs:  BMP:   Recent Labs     05/16/22 0454 05/17/22 0132 05/18/22  0108   * 134* 133*   K 6.7* 4.2 4.8   CL 85* 93* 90*   CO2 19* 24 24   PHOS 8.8* 5.0* 5.8*   BUN 89* 47* 67*   CREATININE 4.4* 2.3* 3.0*   CALCIUM 9.3 8.8 9.3     CBC:   Recent Labs     05/16/22 0454 05/16/22 0454 05/17/22 0132 05/17/22  0242 05/18/22  0108   WBC 11.5*  --  9.0  --  11.6*   HGB 8.1*   < > 6.6* 6.8* 8.2*   HCT 27.2*   < > 22.0* 23.0* 26.9*   MCV 96.5  --  96.5  --  95.1     --  267  --  234    < > = values in this interval not displayed. LIVER PROFILE:   Recent Labs     05/16/22 0454 05/17/22 0132 05/18/22  0108   AST 13 11 8   ALT 8 7 <5*   BILITOT 0.4 0.3 0.4   ALKPHOS 114* 109* 105*     PT/INR:   Recent Labs     05/16/22 0454 05/17/22 0132 05/18/22  0108   PROTIME 15.1* 15.4* 14.1   INR 1.19* 1.22* 1.09     APTT: No results for input(s): APTT in the last 72 hours. BNP:  No results for input(s): BNP in the last 72 hours. Ionized Calcium:No results for input(s): IONCA in the last 72 hours.   Magnesium:  Recent Labs     05/16/22 0454 05/17/22 0132 05/18/22  0108   MG 2.7* 2.3 2.5     Phosphorus:  Recent Labs     05/16/22 0454 05/17/22 0132 05/18/22  0108   PHOS 8. 8* 5.0* 5.8*     HgbA1C:   No results for input(s): LABA1C in the last 72 hours. Hepatic:   Recent Labs     05/16/22  0454 05/17/22  0132 05/18/22  0108   ALKPHOS 114* 109* 105*   ALT 8 7 <5*   AST 13 11 8   PROT 6.0* 5.6* 6.4*   BILITOT 0.4 0.3 0.4   LABALBU 2.9* 2.8* 2.7*     Lactic Acid: No results for input(s): LACTA in the last 72 hours. Troponin: No results for input(s): CKTOTAL, CKMB, TROPONINT in the last 72 hours. ABGs:   Lab Results   Component Value Date    PHART 7.540 05/04/2022    PO2ART 119.0 05/04/2022    YQZ4FBJ 36.0 05/04/2022     CRP:  No results for input(s): CRP in the last 72 hours. Sed Rate:  No results for input(s): SEDRATE in the last 72 hours. Culture Results:   Blood Culture Recent:   Recent Labs     04/24/22  1447   BC No growth after 5 days of incubation. Urine Culture Recent :   Recent Labs     04/24/22  1354   LABURIN No growth at 36-48 hours       Radiology reports as per the Radiologist  Radiology: CT Head WO Contrast    Result Date: 4/24/2022  CT HEAD WO CONTRAST 4/24/2022 2:01 PM HISTORY: Altered mental status COMPARISON: CT scan dated 11/18/2021 DOSE LENGTH PRODUCT: 766 mGy cm TECHNIQUE: Helical tomographic images of the brain were obtained without the use of intravenous contrast. Automated exposure control was also utilized to decrease patient radiation dose. FINDINGS: There is no evidence of evolving large vascular territory infarct. No visualized intra-axial or extra-axial hemorrhage. No mass lesion is identified. Normal size and configuration of the ventricular system. The basal cisterns are symmetric. Posterior fossa structures are unremarkable. The included orbits and their contents are unremarkable. Chronic paranasal sinus disease on the left. The visualized osseous structures and overlying soft tissues of the skull and face are unremarkable. 1. No acute intracranial process.  Signed by Dr Milli Aguilar    XR CHEST PORTABLE    Result Date: 4/24/2022  XR CHEST PORTABLE 4/24/2022 4:27 PM HISTORY: ET tube, enteric tube  Technique: Single AP view of the chest COMPARISONS: 4/24/2022 FINDINGS: Endotracheal tube is identified with tip essentially touching the krysta. Enteric tube courses into the stomach with tip curled in the fundus back towards the gastroesophageal junction. The lungs are clear and well expanded. Heart size is stable. Pulmonary vasculature are nondilated. No pleural effusion or pneumothorax. 1. Endotracheal tube is deep, tip essentially touching the krysta. Lungs are clear and well expanded. I would recommend 2-3 cm withdrawal for a more optimal positioning. The results of this exam were discussed with Angely Best (ICU nursing) on 4/24/2022 at 1640 hours Signed by Dr Edis Nguyen    XR CHEST PORTABLE    Result Date: 4/24/2022  XR CHEST PORTABLE 4/24/2022 1:17 PM HISTORY: ET tube placement  Technique: Single AP view of the chest COMPARISONS: 4/24/2022 FINDINGS: Status post endotracheal intubation. ET tube is in good position. Lungs are well-expanded. Enteric tube curls on itself in the midesophagus and then extends back up into the throat. Heart size is stable. Pulmonary vasculature are nondilated. 1. ET tube is in good position. Lungs are clear and well expanded. 2. Enteric tube curls on itself in the mid esophagus before extending back up into the throat. This will need repositioned. The results of this exam were discussed with Dr. Lawrence Farrar on 4/24/2022 at 1329 hours Signed by Dr Edis Nguyen    XR CHEST PORTABLE    Result Date: 4/24/2022  XR CHEST PORTABLE 4/24/2022 12:34 PM HISTORY: Altered mental status  Technique: Single AP view of the chest COMPARISONS: Chest exam dated 4/1/2022 FINDINGS: No lung consolidation. No pleural effusion or pneumothorax. Cardiomediastinal silhouette and pulmonary vasculature are unremarkable. No acute bony abnormality. Left subclavian region vascular stent with additional stent projecting over the left humerus.     No acute cardiopulmonary process. Signed by Dr Irma Canales    Result Date: 4/1/2022  XR CHEST PORTABLE 4/1/2022 12:35 PM HISTORY: Shortness of breath  Technique: Single AP view of the chest COMPARISONS: Chest exam dated 1/22/2022 FINDINGS: Cardiomegaly with central pulmonary vascular congestion, interstitial and alveolar edema as well as bilateral moderate layering pleural effusions. No pneumothorax. No acute bony abnormality. 1. Volume overload with interstitial and carie pulmonary edema as well as bilateral moderate pleural effusions. Signed by Dr Jessica Bolton   1. End-stage renal disease/currently seen on hemodialysis. 2.  Type II diabetic nephropathy. 3.  Acute on chronic respiratory failure, trach and vented. 4.  Status post grand mal seizure  5. Severe hyponatremia due to volume overload. 6.  Anemia of chronic kidney disease. 7.  Urinary tract infection. 8.  Pulmonary edema  9. Hypokalemia  10. Acute ischemic brain infarct. Plan:  1. Tolerating dialysis very well. 2.  Possible discharge to long-term acute care hospital soon. 3.  Continue LANDON. 4.  Plan was discussed with LTAC coordinator.       Gogo Wagner MD  05/18/22  8:50 AM

## 2022-05-18 NOTE — PROGRESS NOTES
[address] [facility] Pharmacokinetic Monitoring Service - Vancomycin    Consulting Provider: Doreen Interiano   Indication: VAP  Target Concentration: Pre-dialysis concentration 21-24 mg/L  Day of Therapy: 11  Additional Antimicrobials: Gentamicin    Pertinent Laboratory Values: Wt Readings from Last 1 Encounters:   05/16/22 134 lb (60.8 kg)     Temp Readings from Last 1 Encounters:   05/18/22 99.7 °F (37.6 °C) (Temporal)     Estimated Creatinine Clearance: 20 mL/min (A) (based on SCr of 3 mg/dL (H)).   Recent Labs     05/17/22  0132 05/18/22  0108   CREATININE 2.3* 3.0*   WBC 9.0 11.6*     Procalcitonin: None ordered at this time    Pertinent Cultures:  Culture Date Source Results   05/06/22 Respiratory MRSA; Raoultella ornithinolytica, Preoteus vulgaris   MRSA Nasal Swab: showed MRSA positive result on 05/09/22    Recent vancomycin administrations                     vancomycin (VANCOCIN) 750 mg in dextrose 5 % 250 mL IVPB (mg) 750 mg New Bag 05/16/22 2714                    Assessment:  Date/Time Current Dose Concentration Dialysis   05/18/22 Pulse Dose 21.0 MWF       Plan:  Concentration-guided dosing due to renal impairment and intermittent hemodialysis  Continue current dose of Vancomycin 750 mg IV x 1 dose after HD today  Vancomycin concentration ordered for 05/20/22 @ 0400, prior to HD session  Pharmacy will continue to monitor patient and adjust therapy as indicated    Thank you for the consult,  Celena Ibarra Student  5/18/2022 8:25 AM

## 2022-05-18 NOTE — CARE COORDINATION
Spoke with Josefa Mary at [employer] [phone] about insurance review and informed insurance is still denying the pt LTACH services at this time. SW will discuss with pt's s/o who is HCS and determine if willing to further appeal with pt's insurance at this time.

## 2022-05-18 NOTE — PROGRESS NOTES
Palliative Care Progress Note  5/18/2022 7:54 AM    Patient:  Radhames Portillo  YOB: 1970  Primary Care Physician: ROMINA Thomas  Advance Directive: Full Code  Admit Date: 4/24/2022       Hospital Day: 24  Portions of this note have been copied forward, however, changed to reflect the most current clinical status of this patient. CHIEF COMPLAINT/REASON FOR CONSULTATION Goals of care    SUBJECTIVE:  Ms. Gabino Simeon is trached and sedated in ICU. Receiving dialysis treatment    Review of Systems:   14 point review of systems is negative except as specifically addressed above. Objective:   VITALS:  BP (!) 182/60   Pulse 89   Temp 99.7 °F (37.6 °C) (Temporal)   Resp 14   Ht 5' 6\" (1.676 m)   Wt 134 lb (60.8 kg)   SpO2 95%   BMI 21.63 kg/m²   24HR INTAKE/OUTPUT:      Intake/Output Summary (Last 24 hours) at 5/18/2022 0754  Last data filed at 5/18/2022 0400  Gross per 24 hour   Intake 1558.97 ml   Output 50 ml   Net 1508.97 ml     General appearance: 46 yo female, chronically ill appearing, trached/sedated, no acute distress  Head: Normocephalic, without obvious abnormality, atraumatic  Eyes: conjunctivae/corneas clear. Pupils sluggish  Ears: normal external ears and nose  Neck: supple, symmetrical, trachea midline, fresh trach present  Lungs: clear bilaterally to auscultation and diminished in bases, mechanically ventilated, equal chest rise  Heart: regular rate and rhythm, S1, S2 normal, no murmur  Abdomen: soft, no grimacing w/ palpation; non-distended, bowel sounds present.  FMS in place with small diarrhea noted   Extremities: trace edema in BUE, No lower extremity edema,  No erythema, no grimacing w/ palpation   Skin: warm, dry, pale  Neurologic: trach/sedated, does not respond to voice, withdrawals to pain, spontaneously moving extremities, less on right side    Medications:      sodium chloride      sodium chloride      propofol 15 mcg/kg/min (05/18/22 0508)    dextrose        insulin glargine  5 Units SubCUTAneous Nightly    sevelamer  0.8 g Oral TID WC    amLODIPine  10 mg Oral Daily    insulin lispro  0-6 Units SubCUTAneous TID WC    insulin lispro  0-3 Units SubCUTAneous Nightly    levETIRAcetam  500 mg Per G Tube BID    atorvastatin  20 mg Oral Daily    rOPINIRole  4 mg Oral Nightly    NIFEdipine  90 mg Oral Daily    metoprolol succinate  100 mg Oral QAM AC    levothyroxine  150 mcg Oral Daily    isosorbide mononitrate  120 mg Oral Daily    DULoxetine  60 mg Oral Daily    collagenase   Topical Daily    sodium chloride flush  5-40 mL IntraVENous 2 times per day    vancomycin (VANCOCIN) intermittent dosing (placeholder)   Other RX Placeholder    aminoglycoside intermittent dosing (placeholder)   Other RX Placeholder    acetylcysteine  600 mg Inhalation BID    ipratropium-albuterol  1 ampule Inhalation Q4H    aspirin  81 mg Oral Daily    heparin (porcine)  5,000 Units SubCUTAneous TID    menthol-zinc oxide   Topical BID    hydrALAZINE  100 mg Oral 3 times per day    chlorhexidine  15 mL Mouth/Throat BID    famotidine (PEPCID) injection  20 mg IntraVENous Daily     sodium chloride, sodium zirconium cyclosilicate, sodium chloride flush, sodium chloride, HYDROmorphone, HYDROmorphone, hydrALAZINE, racepinephrine HCl, sodium chloride nebulizer, Peppermint Spirit, sodium phosphate IVPB **OR** sodium phosphate IVPB, glucagon (rDNA), dextrose, glucose, dextrose bolus **OR** dextrose bolus, LORazepam, ondansetron **OR** ondansetron, polyethylene glycol, acetaminophen **OR** acetaminophen, magic butt cream, magnesium sulfate, potassium alternative oral replacement **OR** potassium chloride  Diet NPO Exceptions are: Sips of Water with Meds  ADULT TUBE FEEDING; PEG; Peptide Based High Protein; Continuous; 20; Yes; 10; Q 6 hours; 48; 0; Other (specify); no free water flush     Lab and other Data:     Recent Labs     05/16/22  0454 05/16/22  0454 05/17/22  0132 05/17/22  0242 05/18/22  0108   WBC 11.5*  --  9.0  --  11.6*   HGB 8.1*   < > 6.6* 6.8* 8.2*     --  267  --  234    < > = values in this interval not displayed. Recent Labs     05/16/22 0454 05/17/22 0132 05/18/22 0108   * 134* 133*   K 6.7* 4.2 4.8   CL 85* 93* 90*   CO2 19* 24 24   BUN 89* 47* 67*   CREATININE 4.4* 2.3* 3.0*   GLUCOSE 303* 402* 328*     Recent Labs     05/16/22 0454 05/17/22 0132 05/18/22  0108   AST 13 11 8   ALT 8 7 <5*   BILITOT 0.4 0.3 0.4   ALKPHOS 114* 109* 105*       Assessment/Plan   Principal Problem:    Acute ischemic stroke (HCC)  Active Problems:    Weakness    Severe malnutrition (HCC)    Gastroesophageal reflux disease without esophagitis    Acquired hypothyroidism    Anemia in chronic kidney disease (CKD)    Type 2 diabetes mellitus with chronic kidney disease on chronic dialysis, with long-term current use of insulin (HCC)    Acute encephalopathy    Respiratory failure ([address]    Uncontrolled type 2 diabetes mellitus with complication, with long-term current use of insulin (HCC)    End stage renal disease (HCC)    PVD (peripheral vascular disease) (HCC)    ESRD on hemodialysis ([address]    Hyponatremia    Hyperglycemia due to type 2 diabetes mellitus ([address]    Uncontrolled hypertension    Seizure ([facility]    Palliative care patient    Uncontrolled type 2 diabetes mellitus with hyperosmolar nonketotic hyperglycemia (HCC)    Altered mental state    Acute hypoxemic respiratory failure (HCC)    Generalized weakness    UTI (urinary tract infection)    Closed fracture of left distal femur (HCC)    History of infection with vancomycin resistant Enterococcus (VRE)  Resolved Problems:    * No resolved hospital problems. *      Visit Summary:  Chart reviewed, patient discussed with nursing staff. Reviewed health issues, work up and treatment plan as well as factors that lead to hospitalization. Ms. Mariel Garibay is seen at bedside this morning with RN current receiving HD treatment.  No acute events since last seen. She remains clinically unchanged with trach and minimal sedation. Tolerating dialysis. SW is assisting with transfer to [facility] pending insurance approval. I called her significant other/legal decision maker, Gregoria Mariam, to update on pts status and plan of care with no answer. VM left with my call back number. Will follow. Recommendations:   1. Palliative care: GOC continue all aggressive measures. Sig other agreeable to [facility] transfer once approved. Code status: FULL CODE    2. Acute hypoxemic respiratory failure- Extubated 5/3/22 but required reintubation that afternoon. Original intubation date 4/24/22. Now with trach/PEG 5/10/22    3. Acute left MCA infarct- mgmt per neuro. MRI 4/29/22 noted. Echo negative 4/30/22. On ASA. Supportive care    4. Hyperglycemia w/ Hx of DM II-mgmt per Hospitalist, SSI    5. S/p tonic-clonic seizure-mgmt per Neurology, cEEG discontinued, Keppra 500 mg BID    6. ESRD on HD-Nephrology managing. Dialysis today    7. Hypertension-Hydralazine 100 mg TID, cardene gtt as needed    8. Clostridium difficile/Norovirus- Oral vancomycin completed 5/8/22    9. MRSA/Raoultella ornithinolytica/Proteus vulgaris respiratory positivity- mgmt per hospitalist.    Thank you for consulting Palliative Care and allowing us to participate in the care of this patient.    Time Spent Counseling > 50%:  YES                                   Total Time Spent with patient/family counseling, workup/treatment review, counseling and placement of orders/preparation of this note: 19 minutes    Electronically signed by ROMINA Snyder CNP on 5/18/2022 at 7:54 AM    (Please note that portions of this note were completed with a voice recognition program.  Nancy Ramires made to edit the dictations but occasionally words are mis-transcribed.)

## 2022-05-18 NOTE — PROGRESS NOTES
Pharmacy Aminoglycoside Consult    Aminoglycoside: Gentamicin  Day: 11  Current Dosing: Pulse dosing for HD    Temp max: 100.2    Estimated Creatinine Clearance: 20 mL/min (A) (based on SCr of 3 mg/dL (H)). Recent Labs     05/17/22 0132 05/18/22  0108   BUN 47* 67*       Recent Labs     05/17/22 0132 05/18/22  0108   CREATININE 2.3* 3.0*       Recent Labs     05/17/22 0132 05/18/22  0108   WBC 9.0 11.6*     Culture Date Source Results   05/06/22 Respiratory MRSA; Raoultella ornithinolytica; Proteus vulgaris       Random level pre-dialysis: 5.6    ASSESSMENT/PLAN: No additional Gentamicin needed at this time. Will draw Gentamicin random level with AM labs on Friday. Thank you for the for consult. Will continue to follow.      Electronically signed by Shalonda Quijano [facility] on 5/18/2022 at 10:13 AM

## 2022-05-18 NOTE — PROGRESS NOTES
[facility] Neurology Progress Note      Patient:   Yamilet Starkey  MR#:    [id number]   Room:    [address] [place] [address]   YOB: 1970  Date of Progress Note: 5/18/2022  Time of Note                           12:37 PM  Consulting Physician:  Rachel Jiménez DO  Attending Physician:  Azeb Martin MD      INTERVAL HISTORY:  No acute events, largely unchanged. REVIEW OF SYSTEMS:  Unable to obtain     PHYSICAL EXAM:    Constitutional    BP (!) 134/56   Pulse 91   Temp 100.2 °F (37.9 °C) (Temporal)   Resp 15   Ht 5' 6\" (1.676 m)   Wt 134 lb (60.8 kg)   SpO2 95%   BMI 21.63 kg/m²   General appearance: Intubated, sedated   EYES -   Conjunctiva normal  Pupillary exam as below, see CN exam in the neurologic exam  ENT-    No scars, masses, or lesions over external nose or ears  Oropharynx - intubated  Cardiovascular -   No clubbing, cyanosis  Pulmonary-   Mechanically ventilated   Musculoskeletal    No significant wasting of muscles noted  Gait as below, see gait exam in the neurologic exam  Muscle strength, tone, stability as below see the motor exam in the neurologic exam.   No bony deformities  Skin    Warm, dry, and intact to inspection and palpation. No rash, erythema, or pallor        NEUROLOGICAL EXAM     Mental status    []? Awake, alert, oriented   []? Affect attention and concentration appear appropriate  []? Recent and remote memory appears unremarkable  []? Speech normal without dysarthria or aphasia, comprehension and repetition intact. COMMENTS:  Unresponsive to voice, not following commands, does appear to localize to pain at times    Cranial Nerves []? No VF deficit to confrontation,  optic discs normal, no papilledema on fundoscopic exam.  []? PERRLA, EOMI, no nystagmus, conjugate eye movements, no ptosis  []? Face symmetric  []? Facial sensation intact  []? Tongue midline no atrophy or fasciculations present  []? Palate midline, hearing to finger rub normal  []?  Shoulder shrug and SCM testing normal  COMMENTS:  PERRL, Face appears sym, cough/gag present, corneals present    Motor   []? 5/5 strength x 4 extremities  [x]? Normal bulk and tone  [x]? No tremor present  []? No rigidity or bradykinesia noted  COMMENTS: Withdrawal x 4, less over RUE   Sensory  []? Sensation intact to light touch, pin prick, vibration, and proprioception BLE  []? Sensation intact to light touch, pin prick, vibration, and proprioception BUE  COMMENTS: Limited    Coordination []? FTN normal bilaterally   []? HTS normal bilaterally  []? SANFORD normal.   COMMENTS: Limited    Reflexes  [x]? Symmetric and non-pathological  [x]? Toes downgoing bilaterally  [x]? No clonus present  COMMENTS:   Gait                  []? Normal steady gait    []? Ataxic    []? Spastic     []? Magnetic     []? Shuffling  [x]? Not assessed  COMMENTS:        LABS/IMAGING:    CT Head WO Contrast    Result Date: 4/24/2022  CT HEAD WO CONTRAST 4/24/2022 2:01 PM HISTORY: Altered mental status COMPARISON: CT scan dated 11/18/2021 DOSE LENGTH PRODUCT: 766 mGy cm TECHNIQUE: Helical tomographic images of the brain were obtained without the use of intravenous contrast. Automated exposure control was also utilized to decrease patient radiation dose. FINDINGS: There is no evidence of evolving large vascular territory infarct. No visualized intra-axial or extra-axial hemorrhage. No mass lesion is identified. Normal size and configuration of the ventricular system. The basal cisterns are symmetric. Posterior fossa structures are unremarkable. The included orbits and their contents are unremarkable. Chronic paranasal sinus disease on the left. The visualized osseous structures and overlying soft tissues of the skull and face are unremarkable. 1. No acute intracranial process.  Signed by Dr Harmony Gardiner    XR CHEST PORTABLE    Result Date: 4/24/2022  XR CHEST PORTABLE 4/24/2022 4:27 PM HISTORY: ET tube, enteric tube  Technique: Single AP view of the chest

## 2022-05-18 NOTE — PROGRESS NOTES
identified by special stain in Kupffer cells and occasional hepatocytes. Negative for evidence of significant portal or lobular inflammation. Negative for evidence of significant fibrosis    OK COLONOSCOPY W/BIOPSY SINGLE/MULTIPLE N/A 10/20/2017    Dr James Pedersen prep-Tubular AP (-) dysplasia x 2--3 yr recall    OK EGD TRANSORAL BIOPSY SINGLE/MULTIPLE N/A 2016    Dr Velia Londono EGD TRANSORAL BIOPSY SINGLE/MULTIPLE N/A 10/20/2017    Dr Nehemias Goodwin (-)    TRACHEOSTOMY  5/10/2022    TRACHEOSTOMY PERCUTANEOUS performed by Katelynn Beard MD at [address] VASCULAR SURGERY Left 2016    fistula by Dr Leonard Sylvester at [address]  10/02/2017    [facility]. Left upper fistulograms/venograms, balloon angioplasty cephalic vein arch [id number] conquest.    VASCULAR SURGERY  2018    FISTULOGRAM    VASCULAR SURGERY  10/29/2018    [facility]. Left upper fistulograms/venograms    VASCULAR SURGERY  2018    [facility]. Arch aortogram,left upper arteriograms.  VASCULAR SURGERY  2019    [facility]. Removal of tunneled dialyis catheter right internal jugular vein.  VASCULAR SURGERY  2019    [facility]. Left upper extremity fistulogram including venography to the superior vena cava. Balloon angioplasty left subclavian vein with 10mm x 40mm conquest balloon. Balloon angioplasty mid/proximal upper arm cephalic vein with 54HG x 40mm conquest balloon. Venograms after balloon angioplasty. Stent left mid/proximal upper arm cephalic vein stenosis fluency 10mm x 60mm self-expanding covered stent. Balloon     VASCULAR SURGERY  2019    cont angioplasty stent with 10mm x 40mm conquest balloon. Completion venograms left upper extremity.        Family History   Problem Relation Age of Onset    Colon Cancer Mother          at 63    Colon Polyps Mother     Lung Cancer Father          at 66    Colon Polyps Father     Stomach Cancer Sister     Breast Cancer Sister 27    Depression Daughter 25 committed suicide    Liver Cancer Neg Hx     Liver Disease Neg Hx     Esophageal Cancer Neg Hx     Rectal Cancer Neg Hx        Social History     Socioeconomic History    Marital status: Single     Spouse name: Not on file    Number of children: 2    Years of education: Not on file    Highest education level: Not on file   Occupational History    Not on file   Tobacco Use    Smoking status: Current Every Day Smoker     Packs/day: 0.50     Years: 33.00     Pack years: 16.50     Types: Cigarettes    Smokeless tobacco: Never Used    Tobacco comment: NOW at 1/4 PD, started at age 24 and has averaged 2 PPD   Vaping Use    Vaping Use: Never used   Substance and Sexual Activity    Alcohol use: No    Drug use: Not Currently     Types: Marijuana Roscoe Kurtis)    Sexual activity: Not Currently     Partners: Male   Other Topics Concern    Not on file   Social History Narrative    Not on file     Social Determinants of Health     Financial Resource Strain:     Difficulty of Paying Living Expenses: Not on file   Food Insecurity:     Worried About Running Out of Food in the Last Year: Not on file    Kendy of Food in the Last Year: Not on file   Transportation Needs:     Lack of Transportation (Medical): Not on file    Lack of Transportation (Non-Medical):  Not on file   Physical Activity:     Days of Exercise per Week: Not on file    Minutes of Exercise per Session: Not on file   Stress:     Feeling of Stress : Not on file   Social Connections:     Frequency of Communication with Friends and Family: Not on file    Frequency of Social Gatherings with Friends and Family: Not on file    Attends Sikhism Services: Not on file    Active Member of Clubs or Organizations: Not on file    Attends Club or Organization Meetings: Not on file    Marital Status: Not on file   Intimate Partner Violence:     Fear of Current or Ex-Partner: Not on file    Emotionally Abused: Not on file    Physically Abused: Not on file    Sexually Abused: Not on file   Housing Stability:     Unable to Pay for Housing in the Last Year: Not on file    Number of Places Lived in the Last Year: Not on file    Unstable Housing in the Last Year: Not on file       Current Facility-Administered Medications   Medication Dose Route Frequency Provider Last Rate Last Admin    vancomycin (VANCOCIN) 750 mg in dextrose 5 % 250 mL IVPB  750 mg IntraVENous Once Donalee Heck, DO        sodium zirconium cyclosilicate (LOKELMA) oral suspension 10 g  10 g Oral TID PRN Jm Boyer MD        insulin glargine (LANTUS) injection vial 5 Units  5 Units SubCUTAneous Nightly James Head MD   5 Units at 05/17/22 1346    sevelamer (RENVELA) packet 0.8 g  0.8 g Oral TID WC James Head MD   0.8 g at 05/18/22 9066    amLODIPine (NORVASC) tablet 10 mg  10 mg Oral Daily Patience Walter, DO   10 mg at 05/16/22 0002    insulin lispro (HUMALOG) injection vial 0-6 Units  0-6 Units SubCUTAneous TID WC James Head MD   5 Units at 05/18/22 0819    insulin lispro (HUMALOG) injection vial 0-3 Units  0-3 Units SubCUTAneous Nightly James Head MD   3 Units at 05/17/22 2053    levETIRAcetam (KEPPRA) 100 MG/ML solution 500 mg  500 mg Per G Tube BID Niranjan Odonnell MD   500 mg at 05/18/22 6921    atorvastatin (LIPITOR) tablet 20 mg  20 mg Oral Daily James Head MD   20 mg at 05/18/22 5410    rOPINIRole (REQUIP) tablet 4 mg  4 mg Oral Nightly James Head MD   4 mg at 05/17/22 2053    NIFEdipine (PROCARDIA XL) extended release tablet 90 mg  90 mg Oral Daily James Head MD   90 mg at 05/17/22 0901    metoprolol succinate (TOPROL XL) extended release tablet 100 mg  100 mg Oral QAM AC James Head MD   100 mg at 05/17/22 0522    levothyroxine (SYNTHROID) tablet 150 mcg  150 mcg Oral Daily James Head MD   150 mcg at 05/18/22 7150    isosorbide mononitrate (IMDUR) extended release tablet 120 mg  120 mg Oral Daily James Head MD   120 mg at 05/17/22 0901    DULoxetine (CYMBALTA) extended release capsule 60 mg  60 mg Oral Daily Laya Klein MD   60 mg at 05/18/22 3892    collagenase ointment   Topical Daily Laya Klein MD   Given at 05/18/22 0827    sodium chloride flush 0.9 % injection 5-40 mL  5-40 mL IntraVENous 2 times per day Sydni Nolasco MD   10 mL at 05/18/22 2110    sodium chloride flush 0.9 % injection 5-40 mL  5-40 mL IntraVENous PRN Sydni Nolasco MD        0.9 % sodium chloride infusion   IntraVENous PRN Sydni Nolasco MD        HYDROmorphone HCl PF (DILAUDID) injection 0.25 mg  0.25 mg IntraVENous Q5 Min PRN Sydni Nolasco MD        HYDROmorphone HCl PF (DILAUDID) injection 0.5 mg  0.5 mg IntraVENous Q5 Min PRN Sydni Nolasco MD        vancomycin Loly Hadley) intermittent dosing (placeholder)   Other RX Placeholder Reji Nolen MD        aminoglycoside intermittent dosing (placeholder)   Other RX Placeholder Reji Nolen MD        acetylcysteine (MUCOMYST) 20 % solution 600 mg  600 mg Inhalation BID Reji Nolen MD   600 mg at 05/18/22 0656    ipratropium-albuterol (DUONEB) nebulizer solution 1 ampule  1 ampule Inhalation Q4H Reji Nolen MD   1 ampule at 05/18/22 1032    hydrALAZINE (APRESOLINE) injection 10 mg  10 mg IntraVENous Q6H PRN Reji Nolen MD   10 mg at 05/17/22 2319    aspirin chewable tablet 81 mg  81 mg Oral Daily Reji Nolen MD   81 mg at 05/18/22 0821    racepinephrine HCl (VAPONEFPRIN) 2.25 % nebulizer solution NEBU 11.25 mg  11.25 mg Nebulization Q4H PRN Reji Nolen MD   11.25 mg at 05/03/22 1408    sodium chloride nebulizer 0.9 % solution 3 mL  3 mL Nebulization Q4H PRN Reji Nolen MD        propofol injection  5-50 mcg/kg/min IntraVENous Continuous Reji Nolen MD 5.4 mL/hr at 05/18/22 0800 15 mcg/kg/min at 05/18/22 0800    heparin (porcine) injection 5,000 Units  5,000 Units SubCUTAneous TID Reji Nolen MD   5,000 Units at 05/18/22 [address] [facility]   Does not apply PRN Van Corrales MD        menthol-zinc oxide (CALMOSEPTINE) 0.44-20.6 % ointment   Topical BID Kalen Cuevas MD   Given at 05/18/22 0535    sodium phosphate 10.5 mmol in sodium chloride 0.9 % 250 mL IVPB  0.16 mmol/kg IntraVENous PRN Van Corrales MD        Or    sodium phosphate 21 mmol in sodium chloride 0.9 % 250 mL IVPB  0.32 mmol/kg IntraVENous PRN Van Corrales MD        hydrALAZINE (APRESOLINE) tablet 100 mg  100 mg Oral 3 times per day Van Corrales MD   100 mg at 05/18/22 0500    glucagon (rDNA) injection 1 mg  1 mg IntraMUSCular PRN Van Corrales MD        dextrose 5 % solution  100 mL/hr IntraVENous PRN Van Corrales MD        glucose chewable tablet 16 g  4 tablet Oral PRN Van Corrales MD        dextrose bolus (hypoglycemia) 10% 125 mL  125 mL IntraVENous PRN Van Corrales MD   Stopped at 05/11/22 2016    Or    dextrose bolus (hypoglycemia) 10% 250 mL  250 mL IntraVENous PRN Van Corrales MD   Stopped at 05/10/22 2026    LORazepam (ATIVAN) injection 1 mg  1 mg IntraVENous Q5 Min PRN Van Corrales MD        ondansetron (ZOFRAN-ODT) disintegrating tablet 4 mg  4 mg Oral Q8H PRN Van Corrales MD        Or    ondansetron [facility]) injection 4 mg  4 mg IntraVENous Q6H PRN Van Corrales MD        polyethylene glycol [facility]) packet 17 g  17 g Oral Daily PRN Van Corrales MD        acetaminophen (TYLENOL) tablet 650 mg  650 mg Oral Q6H PRN Van Corrales MD   650 mg at 04/25/22 0035    Or    acetaminophen (TYLENOL) suppository 650 mg  650 mg Rectal Q6H PRN Van Corrales MD        chlorhexidine (PERIDEX) 0.12 % solution 15 mL  15 mL Mouth/Throat BID Van Corrales MD   15 mL at 05/18/22 0823    famotidine (PEPCID) 20 mg in sodium chloride (PF) 10 mL injection  20 mg IntraVENous Daily Van Corrales MD   20 mg at 05/18/22 4670    magic butt cream   Topical Q4H PRN Van Corrales MD   Given at 05/03/22 0940    magnesium sulfate 1000 mg in dextrose 5% 100 mL IVPB  1,000 mg IntraVENous PRN Rosa Elena Huang MD        potassium bicarb-citric acid (EFFER-K) effervescent tablet 40 mEq  40 mEq Oral PRN Rosa Elena Huang MD        Or    potassium chloride 10 mEq/100 mL IVPB (Peripheral Line)  10 mEq IntraVENous PRN Rosa Elena Huang MD   Stopped at 04/25/22 0936         sodium chloride      propofol 15 mcg/kg/min (05/18/22 0800)    dextrose          Objective:   BP (!) 134/56   Pulse 91   Temp 100.2 °F (37.9 °C) (Temporal)   Resp 15   Ht 5' 6\" (1.676 m)   Wt 134 lb (60.8 kg)   SpO2 95%   BMI 21.63 kg/m²     General: no acute distress  HEENT: normocephalic, atraumatic  Lungs: faint rhonchi  Cardiovascular: s1 and s2 normal  Abdomen: soft, positive bowel sounds  Extremities: no cyanosis   Neuro: trached and sedated    Recent Labs     05/16/22 0454 05/16/22 0454 05/17/22 0132 05/17/22 0242 05/18/22  0108   WBC 11.5*  --  9.0  --  11.6*   RBC 2.82*  --  2.28*  --  2.83*   HGB 8.1*   < > 6.6* 6.8* 8.2*   HCT 27.2*   < > 22.0* 23.0* 26.9*   MCV 96.5  --  96.5  --  95.1   MCH 28.7  --  28.9  --  29.0   MCHC 29.8*  --  30.0*  --  30.5*     --  267  --  234    < > = values in this interval not displayed. Recent Labs     05/16/22 0454 05/17/22 0132 05/18/22 0108   * 134* 133*   K 6.7* 4.2 4.8   ANIONGAP 26* 17 19   CL 85* 93* 90*   CO2 19* 24 24   BUN 89* 47* 67*   CREATININE 4.4* 2.3* 3.0*   GLUCOSE 303* 402* 328*   CALCIUM 9.3 8.8 9.3     Recent Labs     05/16/22 0454 05/17/22 0132 05/18/22  0108   MG 2.7* 2.3 2.5   PHOS 8.8* 5.0* 5.8*     Recent Labs     05/16/22 0454 05/17/22 0132 05/18/22 0108   AST 13 11 8   ALT 8 7 <5*   BILITOT 0.4 0.3 0.4   ALKPHOS 114* 109* 105*     No results for input(s): PH, PO2, PCO2, HCO3, BE, O2SAT in the last 72 hours. No results for input(s): TROPONINI in the last 72 hours.   Recent Labs     05/16/22 0454 05/17/22 0132 05/18/22 0108   INR 1.19* 1.22* 1.09     No results for input(s): LACTA in the last 72 hours. Intake/Output Summary (Last 24 hours) at 5/18/2022 1133  Last data filed at 5/18/2022 0800  Gross per 24 hour   Intake 1117.54 ml   Output 250 ml   Net 867.54 ml       No results found. 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 Shay Gibson     ESRD on HD  Renal following     Medical noncompliance       C. difficile infection  Completed oral vancomycin course     Tobacco dependence       IDDM 2  Meds on board     DVT prophylaxis  Heparin     Dispo: Discussed with social work. LTAC eval in progress.     Total critical care time: 40 minutes    Azeb Martin MD   5/18/2022 11:33 AM

## 2022-05-19 LAB
ALBUMIN SERPL-MCNC: 2.8 G/DL (ref 3.5–5.2)
ALP BLD-CCNC: 109 U/L (ref 35–104)
ALT SERPL-CCNC: 6 U/L (ref 5–33)
ANION GAP SERPL CALCULATED.3IONS-SCNC: 16 MMOL/L (ref 7–19)
AST SERPL-CCNC: 12 U/L (ref 5–32)
BASE EXCESS ARTERIAL: 7.4 MMOL/L (ref -2–2)
BASOPHILS ABSOLUTE: 0.1 K/UL (ref 0–0.2)
BASOPHILS RELATIVE PERCENT: 0.5 % (ref 0–1)
BILIRUB SERPL-MCNC: 0.4 MG/DL (ref 0.2–1.2)
BUN BLDV-MCNC: 39 MG/DL (ref 6–20)
CALCIUM SERPL-MCNC: 8.9 MG/DL (ref 8.6–10)
CARBOXYHEMOGLOBIN ARTERIAL: 1.9 % (ref 0–5)
CHLORIDE BLD-SCNC: 90 MMOL/L (ref 98–111)
CO2: 26 MMOL/L (ref 22–29)
CREAT SERPL-MCNC: 2.1 MG/DL (ref 0.5–0.9)
EOSINOPHILS ABSOLUTE: 0.1 K/UL (ref 0–0.6)
EOSINOPHILS RELATIVE PERCENT: 1.3 % (ref 0–5)
FIO2: 35 %
GFR AFRICAN AMERICAN: 30
GFR NON-AFRICAN AMERICAN: 25
GLUCOSE BLD-MCNC: 135 MG/DL (ref 70–99)
GLUCOSE BLD-MCNC: 148 MG/DL (ref 70–99)
GLUCOSE BLD-MCNC: 169 MG/DL (ref 70–99)
GLUCOSE BLD-MCNC: 199 MG/DL (ref 74–109)
GLUCOSE BLD-MCNC: 239 MG/DL (ref 70–99)
HCO3 ARTERIAL: 28.8 MMOL/L (ref 22–26)
HCT VFR BLD CALC: 26.7 % (ref 37–47)
HEMOGLOBIN, ART, EXTENDED: 8.7 G/DL (ref 12–16)
HEMOGLOBIN: 8.1 G/DL (ref 12–16)
IMMATURE GRANULOCYTES #: 0.1 K/UL
INR BLD: 1.13 (ref 0.88–1.18)
LYMPHOCYTES ABSOLUTE: 0.6 K/UL (ref 1.1–4.5)
LYMPHOCYTES RELATIVE PERCENT: 5.4 % (ref 20–40)
MAGNESIUM: 2.4 MG/DL (ref 1.6–2.6)
MCH RBC QN AUTO: 29.7 PG (ref 27–31)
MCHC RBC AUTO-ENTMCNC: 30.3 G/DL (ref 33–37)
MCV RBC AUTO: 97.8 FL (ref 81–99)
METHEMOGLOBIN ARTERIAL: 0.9 %
MONOCYTES ABSOLUTE: 0.6 K/UL (ref 0–0.9)
MONOCYTES RELATIVE PERCENT: 5 % (ref 0–10)
NEUTROPHILS ABSOLUTE: 9.7 K/UL (ref 1.5–7.5)
NEUTROPHILS RELATIVE PERCENT: 87.4 % (ref 50–65)
O2 CONTENT ARTERIAL: 12.1 ML/DL
O2 SAT, ARTERIAL: 96.5 %
O2 THERAPY: ABNORMAL
PCO2 ARTERIAL: 28 MMHG (ref 35–45)
PDW BLD-RTO: 17.4 % (ref 11.5–14.5)
PERFORMED ON: ABNORMAL
PH ARTERIAL: 7.62 (ref 7.35–7.45)
PHOSPHORUS: 3.8 MG/DL (ref 2.5–4.5)
PLATELET # BLD: 217 K/UL (ref 130–400)
PMV BLD AUTO: 10.6 FL (ref 9.4–12.3)
PO2 ARTERIAL: 127 MMHG (ref 80–100)
POSITIVE END EXP PRESS: 5
POTASSIUM SERPL-SCNC: 4.4 MMOL/L (ref 3.5–5)
POTASSIUM, WHOLE BLOOD: 4.3
PROTHROMBIN TIME: 14.5 SEC (ref 12–14.6)
RBC # BLD: 2.73 M/UL (ref 4.2–5.4)
REASON FOR REJECTION: NORMAL
REASON FOR REJECTION: NORMAL
REJECTED TEST: NORMAL
REJECTED TEST: NORMAL
SODIUM BLD-SCNC: 132 MMOL/L (ref 136–145)
TOTAL PROTEIN: 5.9 G/DL (ref 6.6–8.7)
WBC # BLD: 11.1 K/UL (ref 4.8–10.8)

## 2022-05-19 PROCEDURE — 6370000000 HC RX 637 (ALT 250 FOR IP): Performed by: SURGERY

## 2022-05-19 PROCEDURE — 6370000000 HC RX 637 (ALT 250 FOR IP): Performed by: INTERNAL MEDICINE

## 2022-05-19 PROCEDURE — 83735 ASSAY OF MAGNESIUM: CPT

## 2022-05-19 PROCEDURE — 80053 COMPREHEN METABOLIC PANEL: CPT

## 2022-05-19 PROCEDURE — 36600 WITHDRAWAL OF ARTERIAL BLOOD: CPT

## 2022-05-19 PROCEDURE — 85025 COMPLETE CBC W/AUTO DIFF WBC: CPT

## 2022-05-19 PROCEDURE — 85610 PROTHROMBIN TIME: CPT

## 2022-05-19 PROCEDURE — 6360000002 HC RX W HCPCS: Performed by: SURGERY

## 2022-05-19 PROCEDURE — 84100 ASSAY OF PHOSPHORUS: CPT

## 2022-05-19 PROCEDURE — 99232 SBSQ HOSP IP/OBS MODERATE 35: CPT | Performed by: PSYCHIATRY & NEUROLOGY

## 2022-05-19 PROCEDURE — 2000000000 HC ICU R&B

## 2022-05-19 PROCEDURE — 2580000003 HC RX 258: Performed by: SURGERY

## 2022-05-19 PROCEDURE — 2500000003 HC RX 250 WO HCPCS: Performed by: SURGERY

## 2022-05-19 PROCEDURE — 2580000003 HC RX 258: Performed by: ANESTHESIOLOGY

## 2022-05-19 PROCEDURE — 94003 VENT MGMT INPAT SUBQ DAY: CPT

## 2022-05-19 PROCEDURE — 82803 BLOOD GASES ANY COMBINATION: CPT

## 2022-05-19 PROCEDURE — 94640 AIRWAY INHALATION TREATMENT: CPT

## 2022-05-19 PROCEDURE — 36415 COLL VENOUS BLD VENIPUNCTURE: CPT

## 2022-05-19 PROCEDURE — 6370000000 HC RX 637 (ALT 250 FOR IP): Performed by: PSYCHIATRY & NEUROLOGY

## 2022-05-19 PROCEDURE — 82947 ASSAY GLUCOSE BLOOD QUANT: CPT

## 2022-05-19 PROCEDURE — 2700000000 HC OXYGEN THERAPY PER DAY

## 2022-05-19 RX ORDER — LORAZEPAM 2 MG/ML
0.5 INJECTION INTRAMUSCULAR EVERY 6 HOURS PRN
Status: DISCONTINUED | OUTPATIENT
Start: 2022-05-19 | End: 2022-05-25 | Stop reason: HOSPADM

## 2022-05-19 RX ORDER — LISINOPRIL 20 MG/1
40 TABLET ORAL DAILY
Status: DISCONTINUED | OUTPATIENT
Start: 2022-05-19 | End: 2022-05-22

## 2022-05-19 RX ADMIN — Medication 10 ML: at 21:04

## 2022-05-19 RX ADMIN — ATORVASTATIN CALCIUM 20 MG: 20 TABLET, FILM COATED ORAL at 07:37

## 2022-05-19 RX ADMIN — IPRATROPIUM BROMIDE AND ALBUTEROL SULFATE 1 AMPULE: 2.5; .5 SOLUTION RESPIRATORY (INHALATION) at 14:39

## 2022-05-19 RX ADMIN — ACETYLCYSTEINE 600 MG: 200 SOLUTION ORAL; RESPIRATORY (INHALATION) at 06:31

## 2022-05-19 RX ADMIN — Medication 10 ML: at 07:39

## 2022-05-19 RX ADMIN — AMLODIPINE BESYLATE 10 MG: 10 TABLET ORAL at 07:36

## 2022-05-19 RX ADMIN — HEPARIN SODIUM 5000 UNITS: 5000 INJECTION INTRAVENOUS; SUBCUTANEOUS at 21:03

## 2022-05-19 RX ADMIN — LISINOPRIL 40 MG: 20 TABLET ORAL at 14:38

## 2022-05-19 RX ADMIN — SODIUM CHLORIDE, PRESERVATIVE FREE 20 MG: 5 INJECTION INTRAVENOUS at 07:36

## 2022-05-19 RX ADMIN — HYDRALAZINE HYDROCHLORIDE 100 MG: 50 TABLET, FILM COATED ORAL at 05:50

## 2022-05-19 RX ADMIN — CHLORHEXIDINE GLUCONATE 15 ML: 1.2 RINSE ORAL at 07:40

## 2022-05-19 RX ADMIN — ANORECTAL OINTMENT: 15.7; .44; 24; 20.6 OINTMENT TOPICAL at 07:39

## 2022-05-19 RX ADMIN — ROPINIROLE HYDROCHLORIDE 4 MG: 4 TABLET, FILM COATED ORAL at 21:03

## 2022-05-19 RX ADMIN — METOPROLOL SUCCINATE 100 MG: 50 TABLET, EXTENDED RELEASE ORAL at 05:50

## 2022-05-19 RX ADMIN — ANORECTAL OINTMENT: 15.7; .44; 24; 20.6 OINTMENT TOPICAL at 17:43

## 2022-05-19 RX ADMIN — NIFEDIPINE 90 MG: 90 TABLET, FILM COATED, EXTENDED RELEASE ORAL at 07:36

## 2022-05-19 RX ADMIN — INSULIN LISPRO 2 UNITS: 100 INJECTION, SOLUTION INTRAVENOUS; SUBCUTANEOUS at 07:35

## 2022-05-19 RX ADMIN — DULOXETINE 60 MG: 60 CAPSULE, DELAYED RELEASE ORAL at 07:36

## 2022-05-19 RX ADMIN — ISOSORBIDE MONONITRATE 120 MG: 60 TABLET, EXTENDED RELEASE ORAL at 07:36

## 2022-05-19 RX ADMIN — INSULIN GLARGINE 8 UNITS: 100 INJECTION, SOLUTION SUBCUTANEOUS at 21:04

## 2022-05-19 RX ADMIN — INSULIN LISPRO 1 UNITS: 100 INJECTION, SOLUTION INTRAVENOUS; SUBCUTANEOUS at 11:59

## 2022-05-19 RX ADMIN — SEVELAMER CARBONATE 0.8 G: 800 POWDER, FOR SUSPENSION ORAL at 11:59

## 2022-05-19 RX ADMIN — LEVOTHYROXINE SODIUM 150 MCG: 75 TABLET ORAL at 05:50

## 2022-05-19 RX ADMIN — IPRATROPIUM BROMIDE AND ALBUTEROL SULFATE 1 AMPULE: 2.5; .5 SOLUTION RESPIRATORY (INHALATION) at 10:23

## 2022-05-19 RX ADMIN — PROPOFOL 10 MCG/KG/MIN: 10 INJECTION, EMULSION INTRAVENOUS at 07:44

## 2022-05-19 RX ADMIN — IPRATROPIUM BROMIDE AND ALBUTEROL SULFATE 1 AMPULE: 2.5; .5 SOLUTION RESPIRATORY (INHALATION) at 18:33

## 2022-05-19 RX ADMIN — HEPARIN SODIUM 5000 UNITS: 5000 INJECTION INTRAVENOUS; SUBCUTANEOUS at 14:25

## 2022-05-19 RX ADMIN — COLLAGENASE SANTYL: 250 OINTMENT TOPICAL at 07:41

## 2022-05-19 RX ADMIN — ACETYLCYSTEINE 600 MG: 200 SOLUTION ORAL; RESPIRATORY (INHALATION) at 18:33

## 2022-05-19 RX ADMIN — IPRATROPIUM BROMIDE AND ALBUTEROL SULFATE 1 AMPULE: 2.5; .5 SOLUTION RESPIRATORY (INHALATION) at 22:00

## 2022-05-19 RX ADMIN — INSULIN LISPRO 1 UNITS: 100 INJECTION, SOLUTION INTRAVENOUS; SUBCUTANEOUS at 21:04

## 2022-05-19 RX ADMIN — SEVELAMER CARBONATE 0.8 G: 800 POWDER, FOR SUSPENSION ORAL at 17:42

## 2022-05-19 RX ADMIN — CHLORHEXIDINE GLUCONATE 15 ML: 1.2 RINSE ORAL at 21:05

## 2022-05-19 RX ADMIN — HEPARIN SODIUM 5000 UNITS: 5000 INJECTION INTRAVENOUS; SUBCUTANEOUS at 07:37

## 2022-05-19 RX ADMIN — LEVETIRACETAM 500 MG: 500 SOLUTION ORAL at 21:03

## 2022-05-19 RX ADMIN — SEVELAMER CARBONATE 0.8 G: 800 POWDER, FOR SUSPENSION ORAL at 07:36

## 2022-05-19 RX ADMIN — HYDRALAZINE HYDROCHLORIDE 100 MG: 50 TABLET, FILM COATED ORAL at 15:07

## 2022-05-19 RX ADMIN — IPRATROPIUM BROMIDE AND ALBUTEROL SULFATE 1 AMPULE: 2.5; .5 SOLUTION RESPIRATORY (INHALATION) at 02:21

## 2022-05-19 RX ADMIN — ASPIRIN 81 MG 81 MG: 81 TABLET ORAL at 07:36

## 2022-05-19 RX ADMIN — LEVETIRACETAM 500 MG: 500 SOLUTION ORAL at 07:36

## 2022-05-19 RX ADMIN — HYDRALAZINE HYDROCHLORIDE 100 MG: 50 TABLET, FILM COATED ORAL at 21:04

## 2022-05-19 RX ADMIN — IPRATROPIUM BROMIDE AND ALBUTEROL SULFATE 1 AMPULE: 2.5; .5 SOLUTION RESPIRATORY (INHALATION) at 06:31

## 2022-05-19 ASSESSMENT — PULMONARY FUNCTION TESTS
PIF_VALUE: 17
PIF_VALUE: 20
PIF_VALUE: 16
PIF_VALUE: 11
PIF_VALUE: 16
PIF_VALUE: 21
PIF_VALUE: 17
PIF_VALUE: 27
PIF_VALUE: 11
PIF_VALUE: 11
PIF_VALUE: 17
PIF_VALUE: 21
PIF_VALUE: 18
PIF_VALUE: 16
PIF_VALUE: 17
PIF_VALUE: 17
PIF_VALUE: 25
PIF_VALUE: 20
PIF_VALUE: 17
PIF_VALUE: 11
PIF_VALUE: 16
PIF_VALUE: 16
PIF_VALUE: 18
PIF_VALUE: 15
PIF_VALUE: 11
PIF_VALUE: 11
PIF_VALUE: 16
PIF_VALUE: 17
PIF_VALUE: 23

## 2022-05-19 NOTE — PROGRESS NOTES
Comprehensive Nutrition Assessment    Type and Reason for Visit:  Reassess    Nutrition Recommendations/Plan:   1. Follow for tolerance of breathing trial, and need to increase EN rate. Malnutrition Assessment:  Malnutrition Status:  Severe malnutrition (05/19/22 1121)    Context:  Acute Illness     Findings of the 6 clinical characteristics of malnutrition:  Energy Intake:  No significant decrease in energy intake  Weight Loss:  Greater than 5% over 1 month     Body Fat Loss:  No significant body fat loss Buccal region   Muscle Mass Loss:  No significant muscle mass loss Thigh (quadraceps),Temples (temporalis)  Fluid Accumulation:  Mild Generalized   Strength:  Not Performed    Nutrition Assessment:    Tolerating EN--residuals 0-10ml. Aware Propofol is now tuned off. Attempting to wean from vent. TF continues at 48ml/hr with no free water flush. Nutrition Related Findings:    PEG, trach, dialysis Wound Type: Pressure Injury,Unstageable,Surgical Incision       Current Nutrition Intake & Therapies:    Average Meal Intake: NPO  Average Supplements Intake: NPO  Current Tube Feeding (TF) Orders:  · Feeding Route: PEG  · Formula: Peptide Based High Protein  · Schedule: Continuous  · Feeding Regimen: Vital High Protein @ 48ml/hr  · Additives/Modulars: None  · Water Flushes: no free water flush  · Current TF & Flush Orders Provides: Vital High Protein @ 48ml/hr = 1152 kcals with 100g protein, 127g CHO and 963 ml free water from formula  · Goal TF & Flush Orders Provides: Vital High Protein @ 48ml/hr = 1152 kcals with 100g protein, 127g CHO and 963 ml free water from formula      Anthropometric Measures:  Height: 5' 6\" (167.6 cm)  Ideal Body Weight (IBW): 130 lbs (59 kg)    Admission Body Weight: 165 lb (74.8 kg) (estimated)  Current Body Weight: 134 lb 9.6 oz (61.1 kg), 103.5 % IBW.  Weight Source: Bed Scale  Current BMI (kg/m2): 21.7  Usual Body Weight: 144 lb (65.3 kg) (1/2022)  % Weight Change (Calculated): 0. 5  BMI Categories: Normal Weight (BMI 18.5-24. 9)    Estimated Daily Nutrient Needs:  Energy Requirements Based On: Kcal/kg (20-25)  Weight Used for Energy Requirements: Current  Energy (kcal/day): 8896-6830 kcals/day  Weight Used for Protein Requirements: Current (1.5-2.0)  Protein (g/day):  g/protein/day  Method Used for Fluid Requirements: Other (Comment) (1000 mL + uop)  Fluid (ml/day): 1000 mL/day    Nutrition Diagnosis:   · Inadequate oral intake related to acute injury/trauma,impaired respiratory function as evidenced by NPO or clear liquid status due to medical condition,intubation,nutrition support - enteral nutrition,weight loss greater than or equal to 5% in 1 month      Nutrition Interventions:   Food and/or Nutrient Delivery: Continue NPO,Continue Current Tube Feeding  Nutrition Education/Counseling: No recommendation at this time  Coordination of Nutrition Care: Continue to monitor while inpatient  Plan of Care discussed with: nursing    Goals:  Previous Goal Met: Progressing toward Goal(s)  Goals: Meet at least 75% of estimated needs,Tolerate nutrition support at goal rate       Nutrition Monitoring and Evaluation:   Behavioral-Environmental Outcomes: None Identified  Food/Nutrient Intake Outcomes: Enteral Nutrition Intake/Tolerance  Physical Signs/Symptoms Outcomes: Biochemical Data,Weight,Skin,Nutrition Focused Physical Findings,Fluid Status or Edema    Discharge Planning:    Enteral Nutrition     Clifford Melendez, MS, RD, LD  Contact: [phone]

## 2022-05-19 NOTE — PROGRESS NOTES
Hospitalist Progress Note  [facility]     Patient: Ventura Morris  : 1970  MRN: [id number]  Code Status: Full Code    Hospital Day: 25   Date of Service: 2022    Subjective:   Patient seen and examined. Trached and sedated.     Past Medical History:   Diagnosis Date    Acute ischemic stroke ([facility] [address] 2022    Arthritis     Chronic kidney disease, stage 5, kidney failure (HCC)     Closed fracture of right shoulder girdle, with routine healing, subsequent encounter     DM (diabetes mellitus) type II controlled with renal manifestation ([facility] [address] 1993    Gastroparesis     GERD (gastroesophageal reflux disease)     Hemodialysis patient ([facility] [address]     dialysis on tues, thur, and sat at [facility]    Hypertension     Hypothyroid     Nasal congestion     recent    Neuropathy     Noncompliance with medications     Palliative care patient 10/08/2019    Restless leg syndrome        Past Surgical History:   Procedure Laterality Date    BREAST SURGERY Left     infected milk gland removed    BREAST SURGERY Right 2021    WEDGE EXCISION RIGHT BREAST DUCT WITH LACRIMAL DUCT PROBE, PEC BLOCK performed by Nabil Schrader MD at [facility], LAPAROSCOPIC          COLONOSCOPY Left 2020    Dr Omari Lovelace hepatic flexure-Severe tortuosity with severe spasming, 1 yr recall    DIALYSIS FISTULA CREATION Left 2019    REVISION LEFT UPPER EXTREMITY AV ACCESS WITH LEFT BRACHIAL ARTERY TO LEFT AXILLARY VEIN WITH  INTERPOSITIONAL ARTEGRAFT   performed by Alireza Arellano MD at [address]      GASTROSTOMY TUBE PLACEMENT N/A 5/10/2022    PERCUTANEOUS ENDOSCOPIC GASTROSTOMY TUBE PLACEMENT, performed by Batool Rodriguez MD at [address] [address] [address] Right 2019    INSERTION OF RIGHT INTERNAL JUGULAR HEMODIALYSIS CATHETER performed by Alireza Arellano MD at [address] [facility]  2018    1.  Moderately increased stainable iron identified by special stain in Kupffer cells and occasional hepatocytes. Negative for evidence of significant portal or lobular inflammation. Negative for evidence of significant fibrosis    AK COLONOSCOPY W/BIOPSY SINGLE/MULTIPLE N/A 10/20/2017    Dr Jackie Montanez prep-Tubular AP (-) dysplasia x 2--3 yr recall    AK EGD TRANSORAL BIOPSY SINGLE/MULTIPLE N/A 2016    Dr Florecita Griffiths EGD TRANSORAL BIOPSY SINGLE/MULTIPLE N/A 10/20/2017    Dr Lovell Half (-)    TRACHEOSTOMY  5/10/2022    TRACHEOSTOMY PERCUTANEOUS performed by Ky Gupta MD at [address] VASCULAR SURGERY Left 2016    fistula by Dr Ela Dc at [address]  10/02/2017    SJS. Left upper fistulograms/venograms, balloon angioplasty cephalic vein arch 81V22 conquest.    VASCULAR SURGERY  2018    FISTULOGRAM    VASCULAR SURGERY  10/29/2018    SJS. Left upper fistulograms/venograms    VASCULAR SURGERY  2018    SJS. Arch aortogram,left upper arteriograms.  VASCULAR SURGERY  2019    SJS. Removal of tunneled dialyis catheter right internal jugular vein.  VASCULAR SURGERY  2019    SJS. Left upper extremity fistulogram including venography to the superior vena cava. Balloon angioplasty left subclavian vein with 10mm x 40mm conquest balloon. Balloon angioplasty mid/proximal upper arm cephalic vein with 16BJ x 40mm conquest balloon. Venograms after balloon angioplasty. Stent left mid/proximal upper arm cephalic vein stenosis fluency 10mm x 60mm self-expanding covered stent. Balloon     VASCULAR SURGERY  2019    cont angioplasty stent with 10mm x 40mm conquest balloon. Completion venograms left upper extremity.        Family History   Problem Relation Age of Onset    Colon Cancer Mother          at 63    Colon Polyps Mother     Lung Cancer Father          at 66    Colon Polyps Father     Stomach Cancer Sister     Breast Cancer Sister 27    Depression Daughter 25 committed suicide    Liver Cancer Neg Hx     Liver Disease Neg Hx     Esophageal Cancer Neg Hx     Rectal Cancer Neg Hx        Social History     Socioeconomic History    Marital status: Single     Spouse name: Not on file    Number of children: 2    Years of education: Not on file    Highest education level: Not on file   Occupational History    Not on file   Tobacco Use    Smoking status: Current Every Day Smoker     Packs/day: 0.50     Years: 33.00     Pack years: 16.50     Types: Cigarettes    Smokeless tobacco: Never Used    Tobacco comment: NOW at 1/4 PD, started at age 24 and has averaged 2 PPD   Vaping Use    Vaping Use: Never used   Substance and Sexual Activity    Alcohol use: No    Drug use: Not Currently     Types: Marijuana Rhea Kales)    Sexual activity: Not Currently     Partners: Male   Other Topics Concern    Not on file   Social History Narrative    Not on file     Social Determinants of Health     Financial Resource Strain:     Difficulty of Paying Living Expenses: Not on file   Food Insecurity:     Worried About Running Out of Food in the Last Year: Not on file    Kendy of Food in the Last Year: Not on file   Transportation Needs:     Lack of Transportation (Medical): Not on file    Lack of Transportation (Non-Medical):  Not on file   Physical Activity:     Days of Exercise per Week: Not on file    Minutes of Exercise per Session: Not on file   Stress:     Feeling of Stress : Not on file   Social Connections:     Frequency of Communication with Friends and Family: Not on file    Frequency of Social Gatherings with Friends and Family: Not on file    Attends Quaker Services: Not on file    Active Member of Clubs or Organizations: Not on file    Attends Club or Organization Meetings: Not on file    Marital Status: Not on file   Intimate Partner Violence:     Fear of Current or Ex-Partner: Not on file    Emotionally Abused: Not on file    Physically Abused: Not on file    Sexually Abused: Not on file   Housing Stability:     Unable to Pay for Housing in the Last Year: Not on file    Number of Places Lived in the Last Year: Not on file    Unstable Housing in the Last Year: Not on file       Current Facility-Administered Medications   Medication Dose Route Frequency Provider Last Rate Last Admin    lisinopril (PRINIVIL;ZESTRIL) tablet 40 mg  40 mg Per NG tube Daily Gogo Wagner MD        LORazepam (ATIVAN) injection 0.5 mg  0.5 mg IntraVENous Q6H PRN Connie Rico DO        insulin glargine (LANTUS) injection vial 8 Units  8 Units SubCUTAneous Nightly Hadley Reese MD   8 Units at 05/18/22 2121    sodium zirconium cyclosilicate (LOKELMA) oral suspension 10 g  10 g Oral TID PRN Gogo Wagner MD        sevelamer (RENVELA) packet 0.8 g  0.8 g Oral TID  Hadley Reese MD   0.8 g at 05/19/22 1159    insulin lispro (HUMALOG) injection vial 0-6 Units  0-6 Units SubCUTAneous TID  Hadley Reese MD   1 Units at 05/19/22 1159    insulin lispro (HUMALOG) injection vial 0-3 Units  0-3 Units SubCUTAneous Nightly Hadley Reese MD   3 Units at 05/18/22 2121    levETIRAcetam (KEPPRA) 100 MG/ML solution 500 mg  500 mg Per G Tube BID Pedro Parmar MD   500 mg at 05/19/22 0736    atorvastatin (LIPITOR) tablet 20 mg  20 mg Oral Daily Hadley Reese MD   20 mg at 05/19/22 0737    rOPINIRole (REQUIP) tablet 4 mg  4 mg Oral Nightly Hadley Reese MD   4 mg at 05/18/22 2114    NIFEdipine (PROCARDIA XL) extended release tablet 90 mg  90 mg Oral Daily Hadley Reese MD   90 mg at 05/19/22 0736    metoprolol succinate (TOPROL XL) extended release tablet 100 mg  100 mg Oral QAM AC Hadley Reese MD   100 mg at 05/19/22 0550    levothyroxine (SYNTHROID) tablet 150 mcg  150 mcg Oral Daily Hadley Reese MD   150 mcg at 05/19/22 0550    isosorbide mononitrate (IMDUR) extended release tablet 120 mg  120 mg Oral Daily Hadley Reese MD   120 mg at 05/19/22 0736    DULoxetine (CYMBALTA) extended release capsule 60 mg  60 mg Oral Daily Carlyn Dennis MD   60 mg at 05/19/22 0736    collagenase ointment   Topical Daily Carlyn Dennis MD   Given at 05/19/22 0741    sodium chloride flush 0.9 % injection 5-40 mL  5-40 mL IntraVENous 2 times per day Laurie Nunes MD   10 mL at 05/19/22 0739    sodium chloride flush 0.9 % injection 5-40 mL  5-40 mL IntraVENous PRN Laurie Nunes MD        0.9 % sodium chloride infusion   IntraVENous PRN Laurie Nunes MD        HYDROmorphone HCl PF (DILAUDID) injection 0.25 mg  0.25 mg IntraVENous Q5 Min PRN Laurie Nunes MD        HYDROmorphone HCl PF (DILAUDID) injection 0.5 mg  0.5 mg IntraVENous Q5 Min PRN Laurie Nunes MD        vancomycin [facility]) intermittent dosing (placeholder)   Other RX Inocencio Leigh MD        aminoglycoside intermittent dosing (placeholder)   Other RX Placeholder Herve Galarza MD        acetylcysteine (MUCOMYST) 20 % solution 600 mg  600 mg Inhalation BID Herve Galarza MD   600 mg at 05/19/22 0631    ipratropium-albuterol (DUONEB) nebulizer solution 1 ampule  1 ampule Inhalation Q4H Herve Galarza MD   1 ampule at 05/19/22 1023    hydrALAZINE (APRESOLINE) injection 10 mg  10 mg IntraVENous Q6H PRN Herve Galarza MD   10 mg at 05/18/22 1617    aspirin chewable tablet 81 mg  81 mg Oral Daily Herve Galarza MD   81 mg at 05/19/22 0736    racepinephrine HCl (VAPONEFPRIN) 2.25 % nebulizer solution NEBU 11.25 mg  11.25 mg Nebulization Q4H PRN Herve Galarza MD   11.25 mg at 05/03/22 1408    sodium chloride nebulizer 0.9 % solution 3 mL  3 mL Nebulization Q4H PRN Herve Galarza MD        propofol injection  5-50 mcg/kg/min IntraVENous Continuous Herve Galarza MD 3.6 mL/hr at 05/19/22 0800 10 mcg/kg/min at 05/19/22 0800    heparin (porcine) injection 5,000 Units  5,000 Units SubCUTAneous TID Herve Galarza MD   5,000 Units at 05/19/22 0737    Peppermint Spirit SPRT   Does not apply PRN Emily Wallace MD        menthol-zinc oxide [facility]) 0.44-20.6 % ointment   Topical BID Gunner Swanson MD   Given at 05/19/22 8659    sodium phosphate 10.5 mmol in sodium chloride 0.9 % 250 mL IVPB  0.16 mmol/kg IntraVENous PRN Emily Wallace MD        Or    sodium phosphate 21 mmol in sodium chloride 0.9 % 250 mL IVPB  0.32 mmol/kg IntraVENous PRN Emily Wallace MD        hydrALAZINE (APRESOLINE) tablet 100 mg  100 mg Oral 3 times per day Emily Wallace MD   100 mg at 05/19/22 0550    glucagon (rDNA) injection 1 mg  1 mg IntraMUSCular PRN Emily Wallace MD        dextrose 5 % solution  100 mL/hr IntraVENous PRN Emily Wallace MD        glucose chewable tablet 16 g  4 tablet Oral PRN Emily Wallace MD        dextrose bolus (hypoglycemia) 10% 125 mL  125 mL IntraVENous PRN Emily Wlalace MD   Stopped at 05/11/22 2016    Or    dextrose bolus (hypoglycemia) 10% 250 mL  250 mL IntraVENous PRN Emily Wallace MD   Stopped at 05/10/22 2026    LORazepam (ATIVAN) injection 1 mg  1 mg IntraVENous Q5 Min PRN Emily Wallace MD        ondansetron (ZOFRAN-ODT) disintegrating tablet 4 mg  4 mg Oral Q8H PRN Emily Wallace MD        Or    [facility] injection 4 mg  4 mg IntraVENous Q6H PRN Emily Wallace MD        polyethylene glycol [facility]) packet 17 g  17 g Oral Daily PRN Emily Wallace MD        acetaminophen (TYLENOL) tablet 650 mg  650 mg Oral Q6H PRN Emily Wallace MD   650 mg at 04/25/22 0035    Or    acetaminophen (TYLENOL) suppository 650 mg  650 mg Rectal Q6H PRN Emily Wallace MD        chlorhexidine (PERIDEX) 0.12 % solution 15 mL  15 mL Mouth/Throat BID Emily Wallace MD   15 mL at 05/19/22 0740    famotidine (PEPCID) 20 mg in sodium chloride (PF) 10 mL injection  20 mg IntraVENous Daily Emily Wallace MD   20 mg at 05/19/22 0736    magic butt cream   Topical Q4H PRN Emily Wallace MD   Given at 05/03/22 0940    magnesium sulfate 1000 mg in dextrose 5% 100 mL IVPB 1,000 mg IntraVENous PRN Denilson Zaragoza MD        potassium bicarb-citric acid (EFFER-K) effervescent tablet 40 mEq  40 mEq Oral PRN Denilosn Zaragoza MD        Or    potassium chloride 10 mEq/100 mL IVPB (Peripheral Line)  10 mEq IntraVENous PRN Denilson Zaragoza MD   Stopped at 04/25/22 0936         sodium chloride      propofol 10 mcg/kg/min (05/19/22 0800)    dextrose          Objective:   BP (!) 114/57   Pulse 92   Temp 99.2 °F (37.3 °C) (Temporal)   Resp 14   Ht 5' 6\" (1.676 m)   Wt 134 lb 9.6 oz (61.1 kg)   SpO2 93%   BMI 21.73 kg/m²     General: no acute distress  HEENT: normocephalic, atraumatic  Lungs: faint rhonchi  Cardiovascular: s1 and s2 normal  Abdomen: soft, positive bowel sounds  Extremities: no cyanosis   Neuro: trached and sedated    Recent Labs     05/17/22 0132 05/17/22 0132 05/17/22  0242 05/18/22 0108 05/19/22 0127   WBC 9.0  --   --  11.6* 11.1*   RBC 2.28*  --   --  2.83* 2.73*   HGB 6.6*   < > 6.8* 8.2* 8.1*   HCT 22.0*   < > 23.0* 26.9* 26.7*   MCV 96.5  --   --  95.1 97.8   MCH 28.9  --   --  29.0 29.7   MCHC 30.0*  --   --  30.5* 30.3*     --   --  234 217    < > = values in this interval not displayed. Recent Labs     05/17/22 0132 05/18/22 0108 05/19/22 0127   * 133* 132*   K 4.2 4.8 4.4   ANIONGAP 17 19 16   CL 93* 90* 90*   CO2 24 24 26   BUN 47* 67* 39*   CREATININE 2.3* 3.0* 2.1*   GLUCOSE 402* 328* 199*   CALCIUM 8.8 9.3 8.9     Recent Labs     05/17/22 0132 05/18/22 0108 05/19/22  0127   MG 2.3 2.5 2.4   PHOS 5.0* 5.8* 3.8     Recent Labs     05/17/22  0132 05/18/22  0108 05/19/22  0127   AST 11 8 12   ALT 7 <5* 6   BILITOT 0.3 0.4 0.4   ALKPHOS 109* 105* 109*     No results for input(s): PH, PO2, PCO2, HCO3, BE, O2SAT in the last 72 hours. No results for input(s): TROPONINI in the last 72 hours.   Recent Labs     05/17/22 0132 05/18/22 0108 05/19/22  0641   INR 1.22* 1.09 1.13     No results for input(s): LACTA in the last 72 hours.      Intake/Output Summary (Last 24 hours) at 5/19/2022 1220  Last data filed at 5/19/2022 1000  Gross per 24 hour   Intake 1482.34 ml   Output 100 ml   Net 1382.34 ml       No results found.      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 Javier Letters     ESRD on HD  Renal following     Medical noncompliance       C. difficile infection  Completed oral vancomycin course     Tobacco dependence       IDDM 2  Meds on board     DVT prophylaxis  Heparin     Dispo:  LTAC eval in progress    Total critical care time: 40 minutes    Dustin Solis MD   5/19/2022 12:20 PM

## 2022-05-19 NOTE — PROGRESS NOTES
Nephrology ([address] [facility]) Progress Note    Patient:  Papa Holland  YOB: 1970  Date of Service: 5/19/2022  MRN: [id number]   Acct: [de-identified]   Primary Care Physician: ROMINA Aponte  Advance Directive: Full Code  Admit Date: 4/24/2022       Hospital Day: 25  Referring Provider: Viki Tam MD    Patient independently seen and examined, Chart, Consults, Notes, Operative notes, Labs, Cardiology, and Radiology studies reviewed as available. Chief complaint: Altered mental status/end-stage renal disease    Subjective:  Papa Holland is a 47 y.o. female for whom we were consulted for evaluation and treatment of end-stage renal disease. Patient also has history of seizure disorder, type 2 diabetes, hypertension, frequent hospitalization with noncompliance and poorly controlled diabetes. Patient was brought to the emergency room after she was found to have unresponsiveness and witnessed grand mal seizure. She was brought in by ambulance. On arrival in the emergency room she was found to be severely hyperglycemic and had a urinary tract infection. Patient was intubated, sedated and currently being treated by neurology for grand mal seizure. She is also receiving IV antibiotics and insulin drip. Her blood sugar control has significantly improved. On April 25, she has received emergent hemodialysis treatment for correction of volume status as well as hyponatremia. She is now moved from ICU to CCU. Recent MRI of brain showed Ischemic brain infarct. Recently patient underwent tracheotomy as she failed to wean off of the ventilator. She also had PEG placement. She is now waiting to go to long-term acute [facility] hospital.  She has received routine hemodialysis treatment yesterday    This morning patient remained on ventilator via tracheotomy. She has poor neurological response.   She is waiting to go to long-term acute [facility] hospital.  There was no changes overnight.       Allergies:  Penicillins, Lamisil advanced [terbinafine], Reglan [metoclopramide], and Stadol [butorphanol]    Medicines:  Current Facility-Administered Medications   Medication Dose Route Frequency Provider Last Rate Last Admin    insulin glargine (LANTUS) injection vial 8 Units  8 Units SubCUTAneous Nightly Kaylie Damian MD   8 Units at 05/18/22 2121    sodium zirconium cyclosilicate (LOKELMA) oral suspension 10 g  10 g Oral TID PRN Meghan Foster MD        sevelamer (RENVELA) packet 0.8 g  0.8 g Oral TID  Kaylie Damian MD   0.8 g at 05/19/22 0736    amLODIPine (NORVASC) tablet 10 mg  10 mg Oral Daily Patience Walter, DO   10 mg at 05/19/22 0736    insulin lispro (HUMALOG) injection vial 0-6 Units  0-6 Units SubCUTAneous TID GIANNA Damian MD   2 Units at 05/19/22 0735    insulin lispro (HUMALOG) injection vial 0-3 Units  0-3 Units SubCUTAneous Nightly Kaylie Damian MD   3 Units at 05/18/22 2121    levETIRAcetam (KEPPRA) 100 MG/ML solution 500 mg  500 mg Per G Tube BID Carley Veliz MD   500 mg at 05/19/22 0736    atorvastatin (LIPITOR) tablet 20 mg  20 mg Oral Daily Kaylie Damian MD   20 mg at 05/19/22 0737    rOPINIRole (REQUIP) tablet 4 mg  4 mg Oral Nightly Kaylie Damian MD   4 mg at 05/18/22 2114    NIFEdipine (PROCARDIA XL) extended release tablet 90 mg  90 mg Oral Daily Kaylie Damian MD   90 mg at 05/19/22 0736    metoprolol succinate (TOPROL XL) extended release tablet 100 mg  100 mg Oral QAM AC Kaylie Damian MD   100 mg at 05/19/22 0550    levothyroxine (SYNTHROID) tablet 150 mcg  150 mcg Oral Daily Kaylie Damian MD   150 mcg at 05/19/22 0550    isosorbide mononitrate (IMDUR) extended release tablet 120 mg  120 mg Oral Daily Kaylie Damian MD   120 mg at 05/19/22 0736    DULoxetine (CYMBALTA) extended release capsule 60 mg  60 mg Oral Daily Kaylie Damian MD   60 mg at 05/19/22 0736    collagenase ointment   Topical Daily Kaylie Damian MD   Given at 05/19/22 0741    sodium chloride flush 0.9 % injection 5-40 mL  5-40 mL IntraVENous 2 times per day Ren Olsen MD   10 mL at 05/19/22 0739    sodium chloride flush 0.9 % injection 5-40 mL  5-40 mL IntraVENous PRN Ren Olsen MD        0.9 % sodium chloride infusion   IntraVENous PRN Ren Olsen MD        HYDROmorphone HCl PF (DILAUDID) injection 0.25 mg  0.25 mg IntraVENous Q5 Min PRN Ren Olsen MD        HYDROmorphone HCl PF (DILAUDID) injection 0.5 mg  0.5 mg IntraVENous Q5 Min PRN Ren Olsen MD        vancomycin Thomos Angelica) intermittent dosing (placeholder)   Other RX Kristian MD Zoie        aminoglycoside intermittent dosing (placeholder)   Other RX Placeholder Ebonie Darling MD        acetylcysteine (MUCOMYST) 20 % solution 600 mg  600 mg Inhalation BID Ebonie Darling MD   600 mg at 05/19/22 0631    ipratropium-albuterol (DUONEB) nebulizer solution 1 ampule  1 ampule Inhalation Q4H Ebonie Darling MD   1 ampule at 05/19/22 0631    hydrALAZINE (APRESOLINE) injection 10 mg  10 mg IntraVENous Q6H PRN Ebonie Darling MD   10 mg at 05/18/22 1617    aspirin chewable tablet 81 mg  81 mg Oral Daily Ebonie Darling MD   81 mg at 05/19/22 0736    racepinephrine HCl (VAPONEFPRIN) 2.25 % nebulizer solution NEBU 11.25 mg  11.25 mg Nebulization Q4H PRN Ebonie Darling MD   11.25 mg at 05/03/22 1408    sodium chloride nebulizer 0.9 % solution 3 mL  3 mL Nebulization Q4H PRN Ebonie Darling MD        propofol injection  5-50 mcg/kg/min IntraVENous Continuous Ebonie Darling MD 3.6 mL/hr at 05/19/22 0744 10 mcg/kg/min at 05/19/22 0744    heparin (porcine) injection 5,000 Units  5,000 Units SubCUTAneous TID Ebonie Darling MD   5,000 Units at 05/19/22 3139    Peppermint Spirit SPRT   Does not apply PRN Ebonie Darling MD        menthol-zinc oxide (CALMOSEPTINE) 0.44-20.6 % ointment   Topical BID Dom Lee MD   Given at 05/19/22 0739    sodium phosphate 10.5 mmol in sodium chloride 0.9 % 250 mL IVPB  0.16 mmol/kg IntraVENous PRN Cindy Florence MD        Or    sodium phosphate 21 mmol in sodium chloride 0.9 % 250 mL IVPB  0.32 mmol/kg IntraVENous PRN Cindy Florence MD        hydrALAZINE (APRESOLINE) tablet 100 mg  100 mg Oral 3 times per day Cindy Florence MD   100 mg at 05/19/22 0550    glucagon (rDNA) injection 1 mg  1 mg IntraMUSCular PRN Cindy Florence MD        dextrose 5 % solution  100 mL/hr IntraVENous PRN Cindy Florence MD        glucose chewable tablet 16 g  4 tablet Oral PRN Cindy Florence MD        dextrose bolus (hypoglycemia) 10% 125 mL  125 mL IntraVENous PRN Cindy Florence MD   Stopped at 05/11/22 2016    Or    dextrose bolus (hypoglycemia) 10% 250 mL  250 mL IntraVENous PRN Cindy Florence MD   Stopped at 05/10/22 2026    LORazepam (ATIVAN) injection 1 mg  1 mg IntraVENous Q5 Min PRN Cindy Florence MD        ondansetron (ZOFRAN-ODT) disintegrating tablet 4 mg  4 mg Oral Q8H PRN Cindy Florence MD        Or    ondansetron [facility]) injection 4 mg  4 mg IntraVENous Q6H PRN Cindy Florence MD        polyethylene glycol [facility]) packet 17 g  17 g Oral Daily PRN Cindy Florence MD        acetaminophen (TYLENOL) tablet 650 mg  650 mg Oral Q6H PRN Cindy Florence MD   650 mg at 04/25/22 0035    Or    acetaminophen (TYLENOL) suppository 650 mg  650 mg Rectal Q6H PRN Cindy Florence MD        chlorhexidine (PERIDEX) 0.12 % solution 15 mL  15 mL Mouth/Throat BID Cindy Florence MD   15 mL at 05/19/22 0740    famotidine (PEPCID) 20 mg in sodium chloride (PF) 10 mL injection  20 mg IntraVENous Daily Cindy Florence MD   20 mg at 05/19/22 0736    magic butt cream   Topical Q4H PRN Cindy Florence MD   Given at 05/03/22 0940    magnesium sulfate 1000 mg in dextrose 5% 100 mL IVPB  1,000 mg IntraVENous PRN Cindy Florence MD        potassium bicarb-citric acid (EFFER-K) effervescent tablet 40 mEq  40 mEq Oral PRN Cindy Florence MD        Or   Neda Garcia potassium chloride 10 mEq/100 mL IVPB (Peripheral Line)  10 mEq IntraVENous PRN Jesika Keyes MD   Stopped at 04/25/22 8292       Past Medical History:  Past Medical History:   Diagnosis Date    Acute ischemic stroke ([facility] 4/30/2022    Arthritis     Chronic kidney disease, stage 5, kidney failure (HCC)     Closed fracture of right shoulder girdle, with routine healing, subsequent encounter     DM (diabetes mellitus) type II controlled with renal manifestation ([facility] [address] [facility] 06/1993    Gastroparesis     GERD (gastroesophageal reflux disease)     Hemodialysis patient ([facility] [address] [facility]     dialysis on tues, thur, and sat at [facility]    Hypertension     Hypothyroid     Nasal congestion     recent    Neuropathy     Noncompliance with medications     Palliative care patient 10/08/2019    Restless leg syndrome        Past Surgical History:  Past Surgical History:   Procedure Laterality Date    BREAST SURGERY Left 2008    infected milk gland removed    BREAST SURGERY Right 5/25/2021    WEDGE EXCISION RIGHT BREAST DUCT WITH LACRIMAL DUCT PROBE, PEC BLOCK performed by Martha Garza MD at [address] [place] [address]      2008    COLONOSCOPY Left 9/17/2020    Dr Nilsa Walker hepatic flexure-Severe tortuosity with severe spasming, 1 yr recall    DIALYSIS FISTULA CREATION Left 1/25/2019    REVISION LEFT UPPER EXTREMITY AV ACCESS WITH LEFT BRACHIAL ARTERY TO LEFT AXILLARY VEIN WITH  INTERPOSITIONAL ARTEGRAFT   performed by Jazzy Montesinos MD at [address]  2012    GASTROSTOMY TUBE PLACEMENT N/A 5/10/2022    PERCUTANEOUS ENDOSCOPIC GASTROSTOMY TUBE PLACEMENT, performed by Jesika Keyes MD at [address] Right 1/25/2019    INSERTION OF RIGHT INTERNAL JUGULAR HEMODIALYSIS CATHETER performed by Jazzy Montesinos MD at [address] [facility]  08/17/2018    1.  Moderately increased stainable iron identified by special stain in Kupffer cells and occasional hepatocytes. Negative for evidence of significant portal or lobular inflammation. Negative for evidence of significant fibrosis    NC COLONOSCOPY W/BIOPSY SINGLE/MULTIPLE N/A 10/20/2017    Dr Dale Flores prep-Tubular AP (-) dysplasia x 2--3 yr recall    NC EGD TRANSORAL BIOPSY SINGLE/MULTIPLE N/A 2016    Dr Karl Smith EGD TRANSORAL BIOPSY SINGLE/MULTIPLE N/A 10/20/2017    Dr Roderick Ritchie (-)    TRACHEOSTOMY  5/10/2022    TRACHEOSTOMY PERCUTANEOUS performed by Chris Joe MD at [address] VASCULAR SURGERY Left 2016    fistula by Dr Raquel De Luna at [address]  10/02/2017    [facility]. Left upper fistulograms/venograms, balloon angioplasty cephalic vein arch 31T51 conquest.    VASCULAR SURGERY  2018    FISTULOGRAM    VASCULAR SURGERY  10/29/2018    [facility]. Left upper fistulograms/venograms    VASCULAR SURGERY  2018    [facility]. Arch aortogram,left upper arteriograms.  VASCULAR SURGERY  2019    [facility]. Removal of tunneled dialyis catheter right internal jugular vein.  VASCULAR SURGERY  2019    [facility]. Left upper extremity fistulogram including venography to the superior vena cava. Balloon angioplasty left subclavian vein with 10mm x 40mm conquest balloon. Balloon angioplasty mid/proximal upper arm cephalic vein with 79NN x 40mm conquest balloon. Venograms after balloon angioplasty. Stent left mid/proximal upper arm cephalic vein stenosis fluency 10mm x 60mm self-expanding covered stent. Balloon     VASCULAR SURGERY  2019    cont angioplasty stent with 10mm x 40mm conquest balloon. Completion venograms left upper extremity.        Family History  Family History   Problem Relation Age of Onset    Colon Cancer Mother          at 63    Colon Polyps Mother     Lung Cancer Father          at 79    Colon Polyps Father     Stomach Cancer Sister     Breast Cancer Sister 27    Depression Daughter 25        committed suicide    Liver Cancer Neg Hx     Liver Housing Stability:     Unable to Pay for Housing in the Last Year: Not on file    Number of Places Lived in the Last Year: Not on file    Unstable Housing in the Last Year: Not on file         Review of Systems:  Review of system is unobtainable as she is currently on a ventilator and poor neurological response.       Objective:  Patient Vitals for the past 24 hrs:   BP Temp Temp src Pulse Resp SpO2 Weight   05/19/22 0800 (!) 180/63 99.2 °F (37.3 °C) Temporal 85 14 95 % --   05/19/22 0631 -- -- -- 90 14 94 % --   05/19/22 0400 (!) 172/77 99.6 °F (37.6 °C) Temporal 92 14 94 % 134 lb 9.6 oz (61.1 kg)   05/19/22 0300 (!) 172/66 -- -- 95 14 -- --   05/19/22 0221 -- -- -- 98 15 95 % --   05/19/22 0000 (!) 163/64 99.5 °F (37.5 °C) Temporal 98 14 95 % --   05/18/22 2300 (!) 166/64 -- -- 102 14 94 % --   05/18/22 2230 -- -- -- 96 14 97 % --   05/18/22 2229 -- -- -- -- 14 97 % --   05/18/22 2200 -- -- -- 98 14 -- --   05/18/22 2100 (!) 160/58 -- -- 97 14 95 % --   05/18/22 2000 -- 99.9 °F (37.7 °C) Temporal 102 (!) 4 94 % --   05/18/22 1900 (!) 143/63 -- -- 100 14 90 % --   05/18/22 1848 -- -- -- 100 14 95 % --   05/18/22 1800 123/62 -- -- 103 14 95 % --   05/18/22 1700 (!) 116/58 -- -- 102 15 94 % --   05/18/22 1617 (!) 182/60 -- -- -- -- -- --   05/18/22 1600 (!) 182/60 99.1 °F (37.3 °C) Temporal 97 14 97 % --   05/18/22 1500 (!) 175/63 -- -- 95 14 97 % --   05/18/22 1449 (!) 167/69 -- -- -- -- -- --   05/18/22 1428 -- -- -- 92 14 98 % --   05/18/22 1410 -- -- -- 91 -- -- --   05/18/22 1400 (!) 167/69 -- -- 90 14 99 % --   05/18/22 1300 (!) 147/64 -- -- 92 16 96 % --   05/18/22 1200 (!) 177/61 99.5 °F (37.5 °C) Temporal 91 14 99 % --   05/18/22 1100 (!) 134/56 -- -- 91 15 95 % --   05/18/22 1031 -- -- -- 88 14 100 % --   05/18/22 1000 (!) 144/64 -- -- 94 16 100 % --   05/18/22 0900 (!) 151/61 -- -- 92 15 96 % --       Intake/Output Summary (Last 24 hours) at 5/19/2022 0858  Last data filed at 5/19/2022 0000  Gross per 24 hour   Intake 1035.4 ml   Output 150 ml   Net 885.4 ml     General: Sleepy/remains on ventilator via tracheostomy. HEENT: Normocephalic atraumatic head  Neck: Supple without JVD or carotid bruits/midline tracheostomy connected to ventilator. Chest: Bilateral clear breath sounds. CVS: Regular rate and rhythm S1 and S2. Abdominal: Soft and nondistended positive bowel sounds. Extremities: No pedal edema. Skin: warm and dry without rash    Labs:  BMP:   Recent Labs     05/17/22 0132 05/18/22 0108 05/19/22 0127   * 133* 132*   K 4.2 4.8 4.4   CL 93* 90* 90*   CO2 24 24 26   PHOS 5.0* 5.8* 3.8   BUN 47* 67* 39*   CREATININE 2.3* 3.0* 2.1*   CALCIUM 8.8 9.3 8.9     CBC:   Recent Labs     05/17/22 0132 05/17/22 0132 05/17/22  0242 05/18/22 0108 05/19/22  0127   WBC 9.0  --   --  11.6* 11.1*   HGB 6.6*   < > 6.8* 8.2* 8.1*   HCT 22.0*   < > 23.0* 26.9* 26.7*   MCV 96.5  --   --  95.1 97.8     --   --  234 217    < > = values in this interval not displayed. LIVER PROFILE:   Recent Labs     05/17/22 0132 05/18/22 0108 05/19/22 0127   AST 11 8 12   ALT 7 <5* 6   BILITOT 0.3 0.4 0.4   ALKPHOS 109* 105* 109*     PT/INR:   Recent Labs     05/17/22 0132 05/18/22 0108 05/19/22  0641   PROTIME 15.4* 14.1 14.5   INR 1.22* 1.09 1.13     APTT: No results for input(s): APTT in the last 72 hours. BNP:  No results for input(s): BNP in the last 72 hours. Ionized Calcium:No results for input(s): IONCA in the last 72 hours. Magnesium:  Recent Labs     05/17/22 0132 05/18/22 0108 05/19/22  0127   MG 2.3 2.5 2.4     Phosphorus:  Recent Labs     05/17/22  0132 05/18/22 0108 05/19/22 0127   PHOS 5.0* 5.8* 3.8     HgbA1C:   No results for input(s): LABA1C in the last 72 hours.   Hepatic:   Recent Labs     05/17/22 0132 05/18/22 0108 05/19/22 0127   ALKPHOS 109* 105* 109*   ALT 7 <5* 6   AST 11 8 12   PROT 5.6* 6.4* 5.9*   BILITOT 0.3 0.4 0.4   LABALBU 2.8* 2.7* 2.8*     Lactic Acid: No results for input(s): LACTA in the last 72 hours. Troponin: No results for input(s): CKTOTAL, CKMB, TROPONINT in the last 72 hours. ABGs:   Lab Results   Component Value Date    PHART 7.540 05/04/2022    PO2ART 119.0 05/04/2022    HHL5NFJ 36.0 05/04/2022     CRP:  No results for input(s): CRP in the last 72 hours. Sed Rate:  No results for input(s): SEDRATE in the last 72 hours. Culture Results:   Blood Culture Recent:   Recent Labs     04/24/22  1447   BC No growth after 5 days of incubation. Urine Culture Recent :   Recent Labs     04/24/22  1354   LABURIN No growth at 36-48 hours       Radiology reports as per the Radiologist  Radiology: CT Head WO Contrast    Result Date: 4/24/2022  CT HEAD WO CONTRAST 4/24/2022 2:01 PM HISTORY: Altered mental status COMPARISON: CT scan dated 11/18/2021 DOSE LENGTH PRODUCT: 766 mGy cm TECHNIQUE: Helical tomographic images of the brain were obtained without the use of intravenous contrast. Automated exposure control was also utilized to decrease patient radiation dose. FINDINGS: There is no evidence of evolving large vascular territory infarct. No visualized intra-axial or extra-axial hemorrhage. No mass lesion is identified. Normal size and configuration of the ventricular system. The basal cisterns are symmetric. Posterior fossa structures are unremarkable. The included orbits and their contents are unremarkable. Chronic paranasal sinus disease on the left. The visualized osseous structures and overlying soft tissues of the skull and face are unremarkable. 1. No acute intracranial process. Signed by Dr Dilan Ordaz    Result Date: 4/24/2022  XR CHEST PORTABLE 4/24/2022 4:27 PM HISTORY: ET tube, enteric tube  Technique: Single AP view of the chest COMPARISONS: 4/24/2022 FINDINGS: Endotracheal tube is identified with tip essentially touching the krysta.  Enteric tube courses into the stomach with tip curled in the fundus back towards the gastroesophageal junction. The lungs are clear and well expanded. Heart size is stable. Pulmonary vasculature are nondilated. No pleural effusion or pneumothorax. 1. Endotracheal tube is deep, tip essentially touching the krysta. Lungs are clear and well expanded. I would recommend 2-3 cm withdrawal for a more optimal positioning. The results of this exam were discussed with Alesia Davidson (ICU nursing) on 4/24/2022 at 1640 hours Signed by Dr Emerald Bay    XR CHEST PORTABLE    Result Date: 4/24/2022  XR CHEST PORTABLE 4/24/2022 1:17 PM HISTORY: ET tube placement  Technique: Single AP view of the chest COMPARISONS: 4/24/2022 FINDINGS: Status post endotracheal intubation. ET tube is in good position. Lungs are well-expanded. Enteric tube curls on itself in the midesophagus and then extends back up into the throat. Heart size is stable. Pulmonary vasculature are nondilated. 1. ET tube is in good position. Lungs are clear and well expanded. 2. Enteric tube curls on itself in the mid esophagus before extending back up into the throat. This will need repositioned. The results of this exam were discussed with Dr. JUÁREZ [facility] on 4/24/2022 at 1329 hours Signed by Dr Emerald Bay    XR CHEST PORTABLE    Result Date: 4/24/2022  XR CHEST PORTABLE 4/24/2022 12:34 PM HISTORY: Altered mental status  Technique: Single AP view of the chest COMPARISONS: Chest exam dated 4/1/2022 FINDINGS: No lung consolidation. No pleural effusion or pneumothorax. Cardiomediastinal silhouette and pulmonary vasculature are unremarkable. No acute bony abnormality. Left subclavian region vascular stent with additional stent projecting over the left humerus. No acute cardiopulmonary process.  Signed by Dr Elizabeth Palmer    Result Date: 4/1/2022  XR CHEST PORTABLE 4/1/2022 12:35 PM HISTORY: Shortness of breath  Technique: Single AP view of the chest COMPARISONS: Chest exam dated 1/22/2022 FINDINGS: Cardiomegaly with central pulmonary vascular congestion, interstitial and alveolar edema as well as bilateral moderate layering pleural effusions. No pneumothorax. No acute bony abnormality. 1. Volume overload with interstitial and carie pulmonary edema as well as bilateral moderate pleural effusions. Signed by Dr Linda Urias   1. End-stage renal disease/on maintenance dialysis. 2.  Type II diabetic nephropathy. 3.  Acute on chronic respiratory failure, trach and vented. 4.  Status post grand mal seizure  5. Severe hyponatremia due to volume overload. 6.  Anemia of chronic kidney disease. 7.  Urinary tract infection. 8.  Pulmonary edema now has resolved. 9.  Hypokalemia  10. Acute ischemic brain infarct. 11.  Poorly controlled systolic and diastolic hypertension. Plan:  1. Routine hemodialysis treatment tomorrow. 2.  As needed blood pressure medications needed with routine medicine. .  3.  Continue LANDON. 4.  Plan was discussed with LTAC coordinator.       Jared Gotti MD  05/19/22  8:58 AM

## 2022-05-19 NOTE — PROGRESS NOTES
Patient placed back on previous ventilator settings. Became tachypneic and Oxygen saturation was 85-87. Will continue to attempt trach collar trials.

## 2022-05-19 NOTE — PROGRESS NOTES
[facility] Progress Note      Patient:   Army Holt  MR#:    [id number]   Room:    [address] [place] [address]   YOB: 1970  Date of Progress Note: 5/19/2022  Time of Note                           11:04 AM  Consulting Physician:  Aissatou Martínez DO  Attending Physician:  Leda Lugo MD      INTERVAL HISTORY:  No acute events, remains largely unchanged. REVIEW OF SYSTEMS:  Unable to obtain     PHYSICAL EXAM:    Constitutional    BP (!) 112/52   Pulse 95   Temp 99.2 °F (37.3 °C) (Temporal)   Resp 14   Ht 5' 6\" (1.676 m)   Wt 134 lb 9.6 oz (61.1 kg)   SpO2 92%   BMI 21.73 kg/m²   General appearance: Intubated, sedated   EYES -   Conjunctiva normal  Pupillary exam as below, see CN exam in the neurologic exam  ENT-    No scars, masses, or lesions over external nose or ears  Oropharynx - intubated  Cardiovascular -   No clubbing, cyanosis  Pulmonary-   Mechanically ventilated   Musculoskeletal    No significant wasting of muscles noted  Gait as below, see gait exam in the neurologic exam  Muscle strength, tone, stability as below see the motor exam in the neurologic exam.   No bony deformities  Skin    Warm, dry, and intact to inspection and palpation. No rash, erythema, or pallor        NEUROLOGICAL EXAM     Mental status    []? Awake, alert, oriented   []? Affect attention and concentration appear appropriate  []? Recent and remote memory appears unremarkable  []? Speech normal without dysarthria or aphasia, comprehension and repetition intact. COMMENTS:  Unresponsive to voice, not following commands, does appear to localize to pain at times    Cranial Nerves []? No VF deficit to confrontation,  optic discs normal, no papilledema on fundoscopic exam.  []? PERRLA, EOMI, no nystagmus, conjugate eye movements, no ptosis  []? Face symmetric  []? Facial sensation intact  []? Tongue midline no atrophy or fasciculations present  []? Palate midline, hearing to finger rub normal  []?  Shoulder shrug and SCM testing normal  COMMENTS:  PERRL, Face appears sym, cough/gag present, corneals present    Motor   []? 5/5 strength x 4 extremities  [x]? Normal bulk and tone  [x]? No tremor present  []? No rigidity or bradykinesia noted  COMMENTS: Withdrawal x 4, less over RUE   Sensory  []? Sensation intact to light touch, pin prick, vibration, and proprioception BLE  []? Sensation intact to light touch, pin prick, vibration, and proprioception BUE  COMMENTS: Limited    Coordination []? FTN normal bilaterally   []? HTS normal bilaterally  []? SANFORD normal.   COMMENTS: Limited    Reflexes  [x]? Symmetric and non-pathological  [x]? Toes downgoing bilaterally  [x]? No clonus present  COMMENTS:   Gait                  []? Normal steady gait    []? Ataxic    []? Spastic     []? Magnetic     []? Shuffling  [x]? Not assessed  COMMENTS:        LABS/IMAGING:    CT Head WO Contrast    Result Date: 4/24/2022  CT HEAD WO CONTRAST 4/24/2022 2:01 PM HISTORY: Altered mental status COMPARISON: CT scan dated 11/18/2021 DOSE LENGTH PRODUCT: 766 mGy cm TECHNIQUE: Helical tomographic images of the brain were obtained without the use of intravenous contrast. Automated exposure control was also utilized to decrease patient radiation dose. FINDINGS: There is no evidence of evolving large vascular territory infarct. No visualized intra-axial or extra-axial hemorrhage. No mass lesion is identified. Normal size and configuration of the ventricular system. The basal cisterns are symmetric. Posterior fossa structures are unremarkable. The included orbits and their contents are unremarkable. Chronic paranasal sinus disease on the left. The visualized osseous structures and overlying soft tissues of the skull and face are unremarkable. 1. No acute intracranial process.  Signed by Dr Harmony Gardiner    XR CHEST PORTABLE    Result Date: 4/24/2022  XR CHEST PORTABLE 4/24/2022 4:27 PM HISTORY: ET tube, enteric tube  Technique: Single AP view of the chest COMPARISONS: 4/24/2022 FINDINGS: Endotracheal tube is identified with tip essentially touching the krysta. Enteric tube courses into the stomach with tip curled in the fundus back towards the gastroesophageal junction. The lungs are clear and well expanded. Heart size is stable. Pulmonary vasculature are nondilated. No pleural effusion or pneumothorax. 1. Endotracheal tube is deep, tip essentially touching the krysta. Lungs are clear and well expanded. I would recommend 2-3 cm withdrawal for a more optimal positioning. The results of this exam were discussed with Justina Gonzalez (ICU nursing) on 4/24/2022 at 1640 hours Signed by Dr Milli Aguilar    XR CHEST PORTABLE    Result Date: 4/24/2022  XR CHEST PORTABLE 4/24/2022 1:17 PM HISTORY: ET tube placement  Technique: Single AP view of the chest COMPARISONS: 4/24/2022 FINDINGS: Status post endotracheal intubation. ET tube is in good position. Lungs are well-expanded. Enteric tube curls on itself in the midesophagus and then extends back up into the throat. Heart size is stable. Pulmonary vasculature are nondilated. 1. ET tube is in good position. Lungs are clear and well expanded. 2. Enteric tube curls on itself in the mid esophagus before extending back up into the throat. This will need repositioned. The results of this exam were discussed with Dr. Leandro Ac on 4/24/2022 at 1329 hours Signed by Dr Milli Aguilar    XR CHEST PORTABLE    Result Date: 4/24/2022  XR CHEST PORTABLE 4/24/2022 12:34 PM HISTORY: Altered mental status  Technique: Single AP view of the chest COMPARISONS: Chest exam dated 4/1/2022 FINDINGS: No lung consolidation. No pleural effusion or pneumothorax. Cardiomediastinal silhouette and pulmonary vasculature are unremarkable. No acute bony abnormality. Left subclavian region vascular stent with additional stent projecting over the left humerus. No acute cardiopulmonary process.  Signed by Dr Milli Aguilar      Lab Results   Component Value Date WBC 11.1 (H) 05/19/2022    HGB 8.1 (L) 05/19/2022    HCT 26.7 (L) 05/19/2022    MCV 97.8 05/19/2022     05/19/2022     Lab Results   Component Value Date     (L) 05/19/2022    K 4.4 05/19/2022    CL 90 (L) 05/19/2022    CO2 26 05/19/2022    BUN 39 (H) 05/19/2022    CREATININE 2.1 (H) 05/19/2022    GLUCOSE 199 (H) 05/19/2022    CALCIUM 8.9 05/19/2022    PROT 5.9 (L) 05/19/2022    LABALBU 2.8 (L) 05/19/2022    BILITOT 0.4 05/19/2022    ALKPHOS 109 (H) 05/19/2022    AST 12 05/19/2022    ALT 6 05/19/2022    LABGLOM 25 (A) 05/19/2022    GFRAA 30 (L) 05/19/2022     Lab Results   Component Value Date    INR 1.13 05/19/2022    INR 1.09 05/18/2022    INR 1.22 (H) 05/17/2022    PROTIME 14.5 05/19/2022    PROTIME 14.1 05/18/2022    PROTIME 15.4 (H) 05/17/2022       RECORD REVIEW:   Previous medical records, medications were reviewed at today's visit. Nursing/physician notes, imaging, labs and vitals reviewed. ASSESSMENT:  47 y.o. admitted after an episode of unresponsiveness followed by generalized tonic-clonic event in the emergency department. She had profoundly elevated glucose, hyponatremia, underlying UTI,  end-stage renal disease with a history of non-compliance noted. MRI with large scattered left MCA distribution stroke. ECHO negative from a cardioembolic standpoint. Trach and PEG now in place. Exam remains largely unchanged overnight, doing about the same, no significant improvement. No clinical seizure activity noted.      PLAN:  1. Continue ASA, supportive care. 2.  Continue Keppra  3. Continue addressing respiratory failure, electrolyte and infectious issues, ESRD nephrology following  4. Limit sedating medications as able, plans to wean propofol and use low dose ativan sparingly for sedation if needed. 5.  Palliative care following      Please feel free to call with any questions. [phone] (cell phone).       Rose Chin DO  Board Certified Neurology

## 2022-05-19 NOTE — PROGRESS NOTES
PHYSICAL THERAPY        DATE: 5/19/2022    This patient has been screened for rehabilitative services and is not appropriate for Physical therapy, Occupational therapy or Speech therapy at this time. We will be available when patient is appropriate.         Electronically signed by Ron Oliveira PT on 5/19/2022 at 12:09 PM

## 2022-05-20 LAB
ALBUMIN SERPL-MCNC: 2.8 G/DL (ref 3.5–5.2)
ALP BLD-CCNC: 111 U/L (ref 35–104)
ALT SERPL-CCNC: 6 U/L (ref 5–33)
ANION GAP SERPL CALCULATED.3IONS-SCNC: 19 MMOL/L (ref 7–19)
AST SERPL-CCNC: 11 U/L (ref 5–32)
BASOPHILS ABSOLUTE: 0.1 K/UL (ref 0–0.2)
BASOPHILS RELATIVE PERCENT: 0.6 % (ref 0–1)
BILIRUB SERPL-MCNC: 0.4 MG/DL (ref 0.2–1.2)
BUN BLDV-MCNC: 63 MG/DL (ref 6–20)
CALCIUM SERPL-MCNC: 9.1 MG/DL (ref 8.6–10)
CHLORIDE BLD-SCNC: 88 MMOL/L (ref 98–111)
CO2: 25 MMOL/L (ref 22–29)
CREAT SERPL-MCNC: 2.9 MG/DL (ref 0.5–0.9)
EOSINOPHILS ABSOLUTE: 0.2 K/UL (ref 0–0.6)
EOSINOPHILS RELATIVE PERCENT: 2.4 % (ref 0–5)
GENTAMICIN DOSE AMOUNT: NORMAL
GENTAMICIN RANDOM: 2.3 UG/ML
GFR AFRICAN AMERICAN: 21
GFR NON-AFRICAN AMERICAN: 17
GLUCOSE BLD-MCNC: 164 MG/DL (ref 74–109)
GLUCOSE BLD-MCNC: 165 MG/DL (ref 70–99)
GLUCOSE BLD-MCNC: 185 MG/DL (ref 70–99)
GLUCOSE BLD-MCNC: 212 MG/DL (ref 70–99)
GLUCOSE BLD-MCNC: 231 MG/DL (ref 70–99)
HCT VFR BLD CALC: 25.5 % (ref 37–47)
HEMOGLOBIN: 7.9 G/DL (ref 12–16)
IMMATURE GRANULOCYTES #: 0 K/UL
INR BLD: 1.13 (ref 0.88–1.18)
LYMPHOCYTES ABSOLUTE: 0.8 K/UL (ref 1.1–4.5)
LYMPHOCYTES RELATIVE PERCENT: 8.8 % (ref 20–40)
MAGNESIUM: 2.5 MG/DL (ref 1.6–2.6)
MCH RBC QN AUTO: 29.3 PG (ref 27–31)
MCHC RBC AUTO-ENTMCNC: 31 G/DL (ref 33–37)
MCV RBC AUTO: 94.4 FL (ref 81–99)
MONOCYTES ABSOLUTE: 0.4 K/UL (ref 0–0.9)
MONOCYTES RELATIVE PERCENT: 4.6 % (ref 0–10)
NEUTROPHILS ABSOLUTE: 8 K/UL (ref 1.5–7.5)
NEUTROPHILS RELATIVE PERCENT: 83.3 % (ref 50–65)
PDW BLD-RTO: 17 % (ref 11.5–14.5)
PERFORMED ON: ABNORMAL
PHOSPHORUS: 5.2 MG/DL (ref 2.5–4.5)
PLATELET # BLD: 210 K/UL (ref 130–400)
PMV BLD AUTO: 9.8 FL (ref 9.4–12.3)
POTASSIUM SERPL-SCNC: 4.7 MMOL/L (ref 3.5–5)
PROTHROMBIN TIME: 14.5 SEC (ref 12–14.6)
RBC # BLD: 2.7 M/UL (ref 4.2–5.4)
SODIUM BLD-SCNC: 132 MMOL/L (ref 136–145)
TOTAL PROTEIN: 5.9 G/DL (ref 6.6–8.7)
VANCOMYCIN RANDOM: 25.4 UG/ML
WBC # BLD: 9.6 K/UL (ref 4.8–10.8)

## 2022-05-20 PROCEDURE — 80202 ASSAY OF VANCOMYCIN: CPT

## 2022-05-20 PROCEDURE — A4216 STERILE WATER/SALINE, 10 ML: HCPCS | Performed by: SURGERY

## 2022-05-20 PROCEDURE — 85610 PROTHROMBIN TIME: CPT

## 2022-05-20 PROCEDURE — 6370000000 HC RX 637 (ALT 250 FOR IP): Performed by: SURGERY

## 2022-05-20 PROCEDURE — 84100 ASSAY OF PHOSPHORUS: CPT

## 2022-05-20 PROCEDURE — 6360000002 HC RX W HCPCS: Performed by: INTERNAL MEDICINE

## 2022-05-20 PROCEDURE — 6370000000 HC RX 637 (ALT 250 FOR IP): Performed by: INTERNAL MEDICINE

## 2022-05-20 PROCEDURE — 6360000002 HC RX W HCPCS: Performed by: PSYCHIATRY & NEUROLOGY

## 2022-05-20 PROCEDURE — 80170 ASSAY OF GENTAMICIN: CPT

## 2022-05-20 PROCEDURE — 94003 VENT MGMT INPAT SUBQ DAY: CPT

## 2022-05-20 PROCEDURE — 85025 COMPLETE CBC W/AUTO DIFF WBC: CPT

## 2022-05-20 PROCEDURE — 36415 COLL VENOUS BLD VENIPUNCTURE: CPT

## 2022-05-20 PROCEDURE — 99232 SBSQ HOSP IP/OBS MODERATE 35: CPT

## 2022-05-20 PROCEDURE — 2700000000 HC OXYGEN THERAPY PER DAY

## 2022-05-20 PROCEDURE — 2580000003 HC RX 258: Performed by: SURGERY

## 2022-05-20 PROCEDURE — 6360000002 HC RX W HCPCS: Performed by: SURGERY

## 2022-05-20 PROCEDURE — 99232 SBSQ HOSP IP/OBS MODERATE 35: CPT | Performed by: PSYCHIATRY & NEUROLOGY

## 2022-05-20 PROCEDURE — 6370000000 HC RX 637 (ALT 250 FOR IP): Performed by: PSYCHIATRY & NEUROLOGY

## 2022-05-20 PROCEDURE — 80053 COMPREHEN METABOLIC PANEL: CPT

## 2022-05-20 PROCEDURE — 2500000003 HC RX 250 WO HCPCS: Performed by: SURGERY

## 2022-05-20 PROCEDURE — 2580000003 HC RX 258: Performed by: INTERNAL MEDICINE

## 2022-05-20 PROCEDURE — 8010000000 HC HEMODIALYSIS ACUTE INPT

## 2022-05-20 PROCEDURE — 82947 ASSAY GLUCOSE BLOOD QUANT: CPT

## 2022-05-20 PROCEDURE — 83735 ASSAY OF MAGNESIUM: CPT

## 2022-05-20 PROCEDURE — 2580000003 HC RX 258: Performed by: ANESTHESIOLOGY

## 2022-05-20 PROCEDURE — 2000000000 HC ICU R&B

## 2022-05-20 PROCEDURE — 94640 AIRWAY INHALATION TREATMENT: CPT

## 2022-05-20 RX ADMIN — NIFEDIPINE 90 MG: 90 TABLET, FILM COATED, EXTENDED RELEASE ORAL at 13:57

## 2022-05-20 RX ADMIN — ANORECTAL OINTMENT: 15.7; .44; 24; 20.6 OINTMENT TOPICAL at 06:02

## 2022-05-20 RX ADMIN — IPRATROPIUM BROMIDE AND ALBUTEROL SULFATE 1 AMPULE: 2.5; .5 SOLUTION RESPIRATORY (INHALATION) at 10:27

## 2022-05-20 RX ADMIN — IPRATROPIUM BROMIDE AND ALBUTEROL SULFATE 1 AMPULE: 2.5; .5 SOLUTION RESPIRATORY (INHALATION) at 22:07

## 2022-05-20 RX ADMIN — LEVETIRACETAM 500 MG: 500 SOLUTION ORAL at 07:41

## 2022-05-20 RX ADMIN — HEPARIN SODIUM 5000 UNITS: 5000 INJECTION INTRAVENOUS; SUBCUTANEOUS at 07:41

## 2022-05-20 RX ADMIN — ASPIRIN 81 MG 81 MG: 81 TABLET ORAL at 07:41

## 2022-05-20 RX ADMIN — HEPARIN SODIUM 5000 UNITS: 5000 INJECTION INTRAVENOUS; SUBCUTANEOUS at 13:57

## 2022-05-20 RX ADMIN — METOPROLOL SUCCINATE 100 MG: 50 TABLET, EXTENDED RELEASE ORAL at 12:14

## 2022-05-20 RX ADMIN — SEVELAMER CARBONATE 0.8 G: 800 POWDER, FOR SUSPENSION ORAL at 16:35

## 2022-05-20 RX ADMIN — ISOSORBIDE MONONITRATE 120 MG: 60 TABLET, EXTENDED RELEASE ORAL at 12:13

## 2022-05-20 RX ADMIN — SEVELAMER CARBONATE 0.8 G: 800 POWDER, FOR SUSPENSION ORAL at 07:41

## 2022-05-20 RX ADMIN — SEVELAMER CARBONATE 0.8 G: 800 POWDER, FOR SUSPENSION ORAL at 12:32

## 2022-05-20 RX ADMIN — INSULIN LISPRO 1 UNITS: 100 INJECTION, SOLUTION INTRAVENOUS; SUBCUTANEOUS at 21:35

## 2022-05-20 RX ADMIN — HYDRALAZINE HYDROCHLORIDE 100 MG: 50 TABLET, FILM COATED ORAL at 06:02

## 2022-05-20 RX ADMIN — SODIUM CHLORIDE, PRESERVATIVE FREE 20 MG: 5 INJECTION INTRAVENOUS at 07:41

## 2022-05-20 RX ADMIN — IPRATROPIUM BROMIDE AND ALBUTEROL SULFATE 1 AMPULE: 2.5; .5 SOLUTION RESPIRATORY (INHALATION) at 02:27

## 2022-05-20 RX ADMIN — DULOXETINE 60 MG: 60 CAPSULE, DELAYED RELEASE ORAL at 07:40

## 2022-05-20 RX ADMIN — CHLORHEXIDINE GLUCONATE 15 ML: 1.2 RINSE ORAL at 21:36

## 2022-05-20 RX ADMIN — IPRATROPIUM BROMIDE AND ALBUTEROL SULFATE 1 AMPULE: 2.5; .5 SOLUTION RESPIRATORY (INHALATION) at 18:36

## 2022-05-20 RX ADMIN — LEVOTHYROXINE SODIUM 150 MCG: 75 TABLET ORAL at 07:41

## 2022-05-20 RX ADMIN — HEPARIN SODIUM 5000 UNITS: 5000 INJECTION INTRAVENOUS; SUBCUTANEOUS at 21:36

## 2022-05-20 RX ADMIN — ACETYLCYSTEINE 600 MG: 200 SOLUTION ORAL; RESPIRATORY (INHALATION) at 06:30

## 2022-05-20 RX ADMIN — ROPINIROLE HYDROCHLORIDE 4 MG: 4 TABLET, FILM COATED ORAL at 21:36

## 2022-05-20 RX ADMIN — LORAZEPAM 0.5 MG: 2 INJECTION INTRAMUSCULAR; INTRAVENOUS at 13:58

## 2022-05-20 RX ADMIN — ATORVASTATIN CALCIUM 20 MG: 20 TABLET, FILM COATED ORAL at 07:40

## 2022-05-20 RX ADMIN — GENTAMICIN SULFATE 189.6 MG: 40 INJECTION, SOLUTION INTRAMUSCULAR; INTRAVENOUS at 16:35

## 2022-05-20 RX ADMIN — LISINOPRIL 40 MG: 20 TABLET ORAL at 12:14

## 2022-05-20 RX ADMIN — HYDRALAZINE HYDROCHLORIDE 100 MG: 50 TABLET, FILM COATED ORAL at 12:14

## 2022-05-20 RX ADMIN — ACETYLCYSTEINE 600 MG: 200 SOLUTION ORAL; RESPIRATORY (INHALATION) at 18:36

## 2022-05-20 RX ADMIN — IPRATROPIUM BROMIDE AND ALBUTEROL SULFATE 1 AMPULE: 2.5; .5 SOLUTION RESPIRATORY (INHALATION) at 14:47

## 2022-05-20 RX ADMIN — INSULIN LISPRO 2 UNITS: 100 INJECTION, SOLUTION INTRAVENOUS; SUBCUTANEOUS at 12:23

## 2022-05-20 RX ADMIN — HYDRALAZINE HYDROCHLORIDE 10 MG: 20 INJECTION INTRAMUSCULAR; INTRAVENOUS at 03:24

## 2022-05-20 RX ADMIN — COLLAGENASE SANTYL: 250 OINTMENT TOPICAL at 14:46

## 2022-05-20 RX ADMIN — Medication 10 ML: at 21:35

## 2022-05-20 RX ADMIN — LEVETIRACETAM 500 MG: 500 SOLUTION ORAL at 21:35

## 2022-05-20 RX ADMIN — CHLORHEXIDINE GLUCONATE 15 ML: 1.2 RINSE ORAL at 12:15

## 2022-05-20 RX ADMIN — HYDRALAZINE HYDROCHLORIDE 100 MG: 50 TABLET, FILM COATED ORAL at 21:35

## 2022-05-20 RX ADMIN — INSULIN LISPRO 1 UNITS: 100 INJECTION, SOLUTION INTRAVENOUS; SUBCUTANEOUS at 16:36

## 2022-05-20 RX ADMIN — INSULIN GLARGINE 8 UNITS: 100 INJECTION, SOLUTION SUBCUTANEOUS at 21:36

## 2022-05-20 RX ADMIN — IPRATROPIUM BROMIDE AND ALBUTEROL SULFATE 1 AMPULE: 2.5; .5 SOLUTION RESPIRATORY (INHALATION) at 06:29

## 2022-05-20 ASSESSMENT — PULMONARY FUNCTION TESTS
PIF_VALUE: 18
PIF_VALUE: 18
PIF_VALUE: 14
PIF_VALUE: 21
PIF_VALUE: 19
PIF_VALUE: 22
PIF_VALUE: 18
PIF_VALUE: 16
PIF_VALUE: 29
PIF_VALUE: 21
PIF_VALUE: 18
PIF_VALUE: 14
PIF_VALUE: 14
PIF_VALUE: 15
PIF_VALUE: 14
PIF_VALUE: 30
PIF_VALUE: 19
PIF_VALUE: 14
PIF_VALUE: 20
PIF_VALUE: 16
PIF_VALUE: 20
PIF_VALUE: 17
PIF_VALUE: 17
PIF_VALUE: 20
PIF_VALUE: 18
PIF_VALUE: 17
PIF_VALUE: 21

## 2022-05-20 ASSESSMENT — PAIN SCALES - GENERAL
PAINLEVEL_OUTOF10: 1
PAINLEVEL_OUTOF10: 0

## 2022-05-20 NOTE — PROGRESS NOTES
[address] [facility] Pharmacokinetic Monitoring Service - Vancomycin    Consulting Provider: Dr. Karely Aviles   Indication: Pneumonia (VAP)  Target Concentration: Pre-Dialysis Concentration 21-24 mg/L  Day of Therapy: 13  Additional Antimicrobials: Gentamicin    Pertinent Laboratory Values:    Wt Readings from Last 1 Encounters:   05/20/22 139 lb 4.8 oz (63.2 kg)     Temp Readings from Last 1 Encounters:   05/20/22 97.6 °F (36.4 °C) (Temporal)     Recent Labs     05/19/22  0127 05/20/22  0319   CREATININE 2.1* 2.9*   WBC 11.1* 9.6     Procalcitonin: No level    Pertinent Cultures:  Culture Date Source Results   05/06/22 Respiratory MRSA; Raoultella ornithinolytica, Preoteus vulgaris   MRSA Nasal Swab: showed MRSA positive result on 05/09/22    Recent vancomycin administrations                     vancomycin (VANCOCIN) 750 mg in dextrose 5 % 250 mL IVPB (mg) 750 mg New Bag 05/18/22 1427                    Assessment:  Date/Time Current Dose Concentration Dialysis Session   05/20/22 @ 0319 Pulse dosing 25.4 MWF     Plan:  Concentration-guided dosing due to renal impairment and intermittent hemodialysis   Current dosing regimen is supra-therapeutic   Decrease dose to 500mg IV x 1 dose after HD on Monday 05/23/22  Vancomycin concentration ordered for 05/25/22 @ 0400, prior to HD session   Pharmacy will continue to monitor patient and adjust therapy as indicated    Thank you for the consult,  Maki Cerrato, PHARM D, 5/20/2022, 11:27 AM

## 2022-05-20 NOTE — PROGRESS NOTES
[facility]  Follow-up Progress Note       NAME:  John Pedersen  MEDICAL RECORD NUMBER:  [id number]  AGE:  47 y.o. GENDER:  female  :  1970  TODAY'S DATE:  2022    Subjective:   Wound Identification:  Wound Type: pressure  Contributing Factors: chronic pressure and decreased mobility        Patient Goal of Care:  [] Wound Healing  [] Odor Control  [] Palliative Care  [] Pain Control   [] Other:     Objective:    BP (!) 157/63   Pulse 97   Temp 97.9 °F (36.6 °C) (Temporal)   Resp 24   Ht 5' 6\" (1.676 m)   Wt 139 lb 4.8 oz (63.2 kg)   SpO2 100%   BMI 22.48 kg/m²   Marek Risk Score: Marek Scale Score: 11  Assessment:   Measurements:  Wound 22 Sacrum (Active)   Wound Image   22 1500   Wound Etiology Pressure Unstageable 22 1500   Dressing Status New dressing applied 22 1500   Wound Cleansed Soap and water 22 1500   Dressing/Treatment Pharmaceutical agent (see MAR); Silicone border  1500   Dressing Change Due 22 1500   Wound Length (cm) 6.5 cm 22 1500   Wound Width (cm) 11 cm 22 1500   Wound Depth (cm) 0 cm 22 0538   Wound Surface Area (cm^2) 71.5 cm^2 22 1500   Change in Wound Size % (l*w) -7050 22 1500   Wound Volume (cm^3) 0 cm^3 22 0538   Wound Assessment Eschar moist;Slough 22 1500   Drainage Amount Scant 22 1500   Drainage Description Serosanguinous 22 1500   Odor None 22 1500   Tracie-wound Assessment Non-blanchable erythema;Blanchable erythema;Denuded 22 1500   Margins Undefined edges 22 0800   Number of days: 24       Wound 22 Neck Mid (device related-trach) (Active)   Wound Image   22 1200   Wound Etiology Pressure Unstageable 22 1500   Dressing Status New dressing applied 22 1500   Wound Cleansed Cleansed with saline 05/20/22 1500   Dressing/Treatment Foam 05/20/22 1500   Dressing Change Due 05/21/22 05/20/22 1500   Wound Assessment Slough 05/20/22 1500   Tracie-wound Assessment Blanchable erythema 05/20/22 1500   Number of days: 0       Response to treatment:  Well tolerated by patient. Pain Assessment:  Severity:  0 / 10  Quality of pain: N/A  Wound Pain Timing/Severity: none  Premedicated: N/A  Plan:   Plan of Care: Wound 04/26/22 Sacrum-Dressing/Treatment: Pharmaceutical agent (see MAR),Silicone border  Wound 05/20/22 Neck Mid (device related-trach)-Dressing/Treatment: Foam    Specialty Bed Required : Yes   [x] Low Air Loss   [] Pressure Redistribution  [] Fluid Immersion  [] Bariatric  [] Total Pressure Relief  [] Other:     Current Diet: Diet NPO Exceptions are: Sips of Water with Meds  ADULT TUBE FEEDING; PEG; Peptide Based High Protein; Continuous; 20; Yes; 10; Q 6 hours; 48; 0; Other (specify); no free water flush  Dietician consult:  Yes    Discharge Plan:  Placement for patient upon discharge: TBD   Patient appropriate for Outpatient [address]: Yes    Referrals:  []   [] [facility]  [] Supplies  [] Other    Patient/Caregiver Teaching:  Level of patient/caregiver understanding able to:   [] Indicates understanding       [] Needs reinforcement  [] Unsuccessful      [] Verbal Understanding  [] Demonstrated understanding       [] No evidence of learning  [] Refused teaching         [x] N/A       Follow up for pressure injury to the sacrum with significant increase in size of the unstageable pressure injury at the sacrum. Increased injury to the periwound is also noted with non blanching and blanching erythema. Patient also has a new device related pressure injury from the trach collar at the lower edge where the trach collar is pulled by the weight of tubing downward and applying pressure. Carlie Guilleto is still sutured in and it is difficult to get any dressing under at this time. Recommendations:    SACRUM: Keep pressure offloaded from sacrum by turning and positioning patient so that your hand easily slides between the sacrum and the bed surface. Use multiple pillows for positioning. Clean with soap and water. Apply Santyl to wound bed. Cover with sacral foam dressing. Change daily and prn. NECK: Clean with saline. Pat dry. Utilize trach foam dressing. May use Interdry under dressing to help wick moisture. Secure trach with arm or by other means to relieve pressure.     Electronically signed by Stephanie Melendez RN, [facility] on 5/20/2022 at 3:11 PM

## 2022-05-20 NOTE — PROGRESS NOTES
Pharmacy Aminoglycoside Consult    Aminoglycoside: Gentamicin  Day: 13  Current Dosing: Pulse dosing for HD    Temp max: 99.1    Estimated Creatinine Clearance: 21 mL/min (A) (based on SCr of 2.9 mg/dL (H)). Recent Labs     05/19/22 0127 05/20/22 0319   BUN 39* 63*       Recent Labs     05/19/22 0127 05/20/22 0319   CREATININE 2.1* 2.9*       Recent Labs     05/19/22 0127 05/20/22 0319   WBC 11.1* 9.6       Culture Date Source Results   05/06/22 Respiratory MRSA; Raoultella ornithinolytica; Proteus vulgaris       Random level pre-HD: 2.3    ASSESSMENT/PLAN: Give Gentamicin 3mg/kg x 1 dose today. Random level prior to next HD.     SHREE BARNES, PHARM D, 5/20/2022, 10:52 AM

## 2022-05-20 NOTE — PROGRESS NOTES
Trach care done. Wound found around stoma and picture taken. Patient looking around room but not following commands. Placed on cpap. fi02 30%.  SBI 80

## 2022-05-20 NOTE — PROGRESS NOTES
[facility] Neurology Progress Note      Patient:   Radhames Portillo  MR#:    [id number]   Room:    [id number]   YOB: 1970  Date of Progress Note: 5/20/2022  Time of Note                           10:03 AM  Consulting Physician:  Celeste De Leon DO  Attending Physician:  Kim Bobby MD      INTERVAL HISTORY:  No acute events, off sedation, more alert, tracking, but still not following commands. REVIEW OF SYSTEMS:  Unable to obtain     PHYSICAL EXAM:    Constitutional    BP (!) 143/70   Pulse 85   Temp 97.6 °F (36.4 °C) (Temporal)   Resp 17   Ht 5' 6\" (1.676 m)   Wt 139 lb 4.8 oz (63.2 kg)   SpO2 100%   BMI 22.48 kg/m²   General appearance: Intubated, sedated   EYES -   Conjunctiva normal  Pupillary exam as below, see CN exam in the neurologic exam  ENT-    No scars, masses, or lesions over external nose or ears  Oropharynx - intubated  Cardiovascular -   No clubbing, cyanosis  Pulmonary-   Mechanically ventilated   Musculoskeletal    No significant wasting of muscles noted  Gait as below, see gait exam in the neurologic exam  Muscle strength, tone, stability as below see the motor exam in the neurologic exam.   No bony deformities  Skin    Warm, dry, and intact to inspection and palpation. No rash, erythema, or pallor        NEUROLOGICAL EXAM     Mental status    []? Awake, alert, oriented   []? Affect attention and concentration appear appropriate  []? Recent and remote memory appears unremarkable  []? Speech normal without dysarthria or aphasia, comprehension and repetition intact. COMMENTS:  Unresponsive to voice, not following commands, does appear to localize to pain at times. Cranial Nerves []? No VF deficit to confrontation,  optic discs normal, no papilledema on fundoscopic exam.  []? PERRLA, EOMI, no nystagmus, conjugate eye movements, no ptosis  []? Face symmetric  []? Facial sensation intact  []? Tongue midline no atrophy or fasciculations present  []?  Palate midline, hearing to finger rub normal  []? Shoulder shrug and SCM testing normal  COMMENTS:  PERRL, Face appears sym    Motor   []? 5/5 strength x 4 extremities  [x]? Normal bulk and tone  [x]? No tremor present  []? No rigidity or bradykinesia noted  COMMENTS: Withdrawal Right side   Sensory  []? Sensation intact to light touch, pin prick, vibration, and proprioception BLE  []? Sensation intact to light touch, pin prick, vibration, and proprioception BUE  COMMENTS: Limited, left sided neglect noted     Coordination []? FTN normal bilaterally   []? HTS normal bilaterally  []? SANFORD normal.   COMMENTS: Limited    Reflexes  [x]? Symmetric and non-pathological  [x]? Toes downgoing bilaterally  [x]? No clonus present  COMMENTS:   Gait                  []? Normal steady gait    []? Ataxic    []? Spastic     []? Magnetic     []? Shuffling  [x]? Not assessed  COMMENTS:        LABS/IMAGING:    CT Head WO Contrast    Result Date: 4/24/2022  CT HEAD WO CONTRAST 4/24/2022 2:01 PM HISTORY: Altered mental status COMPARISON: CT scan dated 11/18/2021 DOSE LENGTH PRODUCT: 766 mGy cm TECHNIQUE: Helical tomographic images of the brain were obtained without the use of intravenous contrast. Automated exposure control was also utilized to decrease patient radiation dose. FINDINGS: There is no evidence of evolving large vascular territory infarct. No visualized intra-axial or extra-axial hemorrhage. No mass lesion is identified. Normal size and configuration of the ventricular system. The basal cisterns are symmetric. Posterior fossa structures are unremarkable. The included orbits and their contents are unremarkable. Chronic paranasal sinus disease on the left. The visualized osseous structures and overlying soft tissues of the skull and face are unremarkable. 1. No acute intracranial process.  Signed by Dr Lazaro Space    Result Date: 4/24/2022  XR CHEST PORTABLE 4/24/2022 4:27 PM HISTORY: ET tube, enteric tube Technique: Single AP view of the chest COMPARISONS: 4/24/2022 FINDINGS: Endotracheal tube is identified with tip essentially touching the krysta. Enteric tube courses into the stomach with tip curled in the fundus back towards the gastroesophageal junction. The lungs are clear and well expanded. Heart size is stable. Pulmonary vasculature are nondilated. No pleural effusion or pneumothorax. 1. Endotracheal tube is deep, tip essentially touching the krysta. Lungs are clear and well expanded. I would recommend 2-3 cm withdrawal for a more optimal positioning. The results of this exam were discussed with Ursula Schumacher (ICU nursing) on 4/24/2022 at 1640 hours Signed by Dr Sigrid Davila    XR CHEST PORTABLE    Result Date: 4/24/2022  XR CHEST PORTABLE 4/24/2022 1:17 PM HISTORY: ET tube placement  Technique: Single AP view of the chest COMPARISONS: 4/24/2022 FINDINGS: Status post endotracheal intubation. ET tube is in good position. Lungs are well-expanded. Enteric tube curls on itself in the midesophagus and then extends back up into the throat. Heart size is stable. Pulmonary vasculature are nondilated. 1. ET tube is in good position. Lungs are clear and well expanded. 2. Enteric tube curls on itself in the mid esophagus before extending back up into the throat. This will need repositioned. The results of this exam were discussed with Dr. JUÁREZ [facility] on 4/24/2022 at 1329 hours Signed by Dr Sigrid Davila    XR CHEST PORTABLE    Result Date: 4/24/2022  XR CHEST PORTABLE 4/24/2022 12:34 PM HISTORY: Altered mental status  Technique: Single AP view of the chest COMPARISONS: Chest exam dated 4/1/2022 FINDINGS: No lung consolidation. No pleural effusion or pneumothorax. Cardiomediastinal silhouette and pulmonary vasculature are unremarkable. No acute bony abnormality. Left subclavian region vascular stent with additional stent projecting over the left humerus. No acute cardiopulmonary process.  Signed by Dr Sigrid Davila      Lab Results   Component Value Date    WBC 9.6 05/20/2022    HGB 7.9 (L) 05/20/2022    HCT 25.5 (L) 05/20/2022    MCV 94.4 05/20/2022     05/20/2022     Lab Results   Component Value Date     (L) 05/20/2022    K 4.7 05/20/2022    CL 88 (L) 05/20/2022    CO2 25 05/20/2022    BUN 63 (H) 05/20/2022    CREATININE 2.9 (H) 05/20/2022    GLUCOSE 164 (H) 05/20/2022    CALCIUM 9.1 05/20/2022    PROT 5.9 (L) 05/20/2022    LABALBU 2.8 (L) 05/20/2022    BILITOT 0.4 05/20/2022    ALKPHOS 111 (H) 05/20/2022    AST 11 05/20/2022    ALT 6 05/20/2022    LABGLOM 17 (A) 05/20/2022    GFRAA 21 (L) 05/20/2022     Lab Results   Component Value Date    INR 1.13 05/20/2022    INR 1.13 05/19/2022    INR 1.09 05/18/2022    PROTIME 14.5 05/20/2022    PROTIME 14.5 05/19/2022    PROTIME 14.1 05/18/2022       RECORD REVIEW:   Previous medical records, medications were reviewed at today's visit. Nursing/physician notes, imaging, labs and vitals reviewed. ASSESSMENT:  47 y.o. admitted after an episode of unresponsiveness followed by generalized tonic-clonic event in the emergency department. She had profoundly elevated glucose, hyponatremia, underlying UTI,  end-stage renal disease with a history of non-compliance noted. MRI with large scattered left MCA distribution stroke. ECHO negative from a cardioembolic standpoint. Trach and PEG now in place. Exam remains largely unchanged overnight, doing about the same, now off sedation. No clinical seizure activity noted.      PLAN:  1. Continue ASA, supportive care. 2.  Continue Keppra  3. Continue addressing respiratory failure, electrolyte and infectious issues, ESRD nephrology following  4. Limit sedating medications as able, plans to wean propofol and use low dose ativan sparingly for sedation if needed. 5.  Palliative care following      Please feel free to call with any questions. [phone] (cell phone).       Saman Partida DO  Board Certified Neurology

## 2022-05-20 NOTE — PROGRESS NOTES
Nephrology ([address] [facility]) Progress Note    Patient:  Jessica Adames  YOB: 1970  Date of Service: 5/20/2022  MRN: [id number]   Acct: [de-identified]   Primary Care Physician: ROMINA Canchola  Advance Directive: Full Code  Admit Date: 4/24/2022       Hospital Day: 32  Referring Provider: Nicole Rainey MD    Patient independently seen and examined, Chart, Consults, Notes, Operative notes, Labs, Cardiology, and Radiology studies reviewed as available. Chief complaint: Altered mental status/end-stage renal disease    Subjective:  Jessica Adames is a 47 y.o. female for whom we were consulted for evaluation and treatment of end-stage renal disease. Patient also has history of seizure disorder, type 2 diabetes, hypertension, frequent hospitalization with noncompliance and poorly controlled diabetes. Patient was brought to the emergency room after she was found to have unresponsiveness and witnessed grand mal seizure. She was brought in by ambulance. On arrival in the emergency room she was found to be severely hyperglycemic and had a urinary tract infection. Patient was intubated, sedated and currently being treated by neurology for grand mal seizure. She is also receiving IV antibiotics and insulin drip. Her blood sugar control has significantly improved. On April 25, she has received emergent hemodialysis treatment for correction of volume status as well as hyponatremia. She is now moved from ICU to CCU. Recent MRI of brain showed Ischemic brain infarct. Recently patient underwent tracheotomy as she failed to wean off of the ventilator. She also had PEG placement. She is now waiting to go to long-term acute care hospital.  She has received routine hemodialysis treatment yesterday    This morning patient remained on ventilator via tracheotomy. She has poor neurological response.   She is waiting to go to long-term acute [facility] hospital.  Currently seen on hemodialysis  Hemodialysis access: AV fistula  Hemodialysis: 3-1/2-hour  Ultrafiltration: 3000 cc  2K bath  Blood flow rate is 450 cc/min.     Allergies:  Penicillins, Lamisil advanced [terbinafine], Reglan [metoclopramide], and Stadol [butorphanol]    Medicines:  Current Facility-Administered Medications   Medication Dose Route Frequency Provider Last Rate Last Admin    lisinopril (PRINIVIL;ZESTRIL) tablet 40 mg  40 mg Per NG tube Daily Saranya Varela MD   40 mg at 05/19/22 1438    LORazepam (ATIVAN) injection 0.5 mg  0.5 mg IntraVENous Q6H PRN Diego Meredith DO        insulin glargine (LANTUS) injection vial 8 Units  8 Units SubCUTAneous Nightly Shanelle Figueroa MD   8 Units at 05/19/22 2104    sodium zirconium cyclosilicate (LOKELMA) oral suspension 10 g  10 g Oral TID PRN Saranya Varela MD        sevelamer (RENVELA) packet 0.8 g  0.8 g Oral TID  Shanelle Figueroa MD   0.8 g at 05/20/22 0741    insulin lispro (HUMALOG) injection vial 0-6 Units  0-6 Units SubCUTAneous TID GIANNA Figueroa MD   1 Units at 05/19/22 1159    insulin lispro (HUMALOG) injection vial 0-3 Units  0-3 Units SubCUTAneous Nightly Shanelle Figueroa MD   1 Units at 05/19/22 2104    levETIRAcetam (KEPPRA) 100 MG/ML solution 500 mg  500 mg Per G Tube BID Neelima Giraldo MD   500 mg at 05/20/22 0741    atorvastatin (LIPITOR) tablet 20 mg  20 mg Oral Daily Shanelle Figueroa MD   20 mg at 05/20/22 0740    rOPINIRole (REQUIP) tablet 4 mg  4 mg Oral Nightly Shanelle Figueroa MD   4 mg at 05/19/22 2103    NIFEdipine (PROCARDIA XL) extended release tablet 90 mg  90 mg Oral Daily Shanelle Figueroa MD   90 mg at 05/19/22 0736    metoprolol succinate (TOPROL XL) extended release tablet 100 mg  100 mg Oral QAM AC Shanelle Figueroa MD   100 mg at 05/19/22 0550    levothyroxine (SYNTHROID) tablet 150 mcg  150 mcg Oral Daily Shanelle Figueroa MD   150 mcg at 05/20/22 0741    isosorbide mononitrate (IMDUR) extended release tablet 120 mg  120 mg Oral Daily Andrew Mullins Wilfred Emerson MD   120 mg at 05/19/22 0736    DULoxetine (CYMBALTA) extended release capsule 60 mg  60 mg Oral Daily Azeb Martin MD   60 mg at 05/20/22 0740    collagenase ointment   Topical Daily Azeb Martin MD   Given at 05/19/22 0741    sodium chloride flush 0.9 % injection 5-40 mL  5-40 mL IntraVENous 2 times per day Fred Giraldo MD   10 mL at 05/19/22 2104    sodium chloride flush 0.9 % injection 5-40 mL  5-40 mL IntraVENous PRN Fred Giraldo MD        0.9 % sodium chloride infusion   IntraVENous PRN Fred Giraldo MD        HYDROmorphone HCl PF (DILAUDID) injection 0.25 mg  0.25 mg IntraVENous Q5 Min PRN Fred Giraldo MD        HYDROmorphone HCl PF (DILAUDID) injection 0.5 mg  0.5 mg IntraVENous Q5 Min PRN Fred Giraldo MD        vancomycin Coleman Grater) intermittent dosing (placeholder)   Other RX Jazmine Betancourt MD        aminoglycoside intermittent dosing (placeholder)   Other RX Placeholder Stiven Cabezas MD        acetylcysteine (MUCOMYST) 20 % solution 600 mg  600 mg Inhalation BID Stiven Cabezas MD   600 mg at 05/20/22 0630    ipratropium-albuterol (DUONEB) nebulizer solution 1 ampule  1 ampule Inhalation Q4H Stiven Cabezas MD   1 ampule at 05/20/22 7816    hydrALAZINE (APRESOLINE) injection 10 mg  10 mg IntraVENous Q6H PRN Stiven Cabezas MD   10 mg at 05/20/22 0324    aspirin chewable tablet 81 mg  81 mg Oral Daily Stiven Cabezas MD   81 mg at 05/20/22 0741    racepinephrine HCl (VAPONEFPRIN) 2.25 % nebulizer solution NEBU 11.25 mg  11.25 mg Nebulization Q4H PRN Stiven Cabezas MD   11.25 mg at 05/03/22 1408    sodium chloride nebulizer 0.9 % solution 3 mL  3 mL Nebulization Q4H PRN Stiven Cabezas MD        propofol injection  5-50 mcg/kg/min IntraVENous Continuous Stvien Cabezas MD   Stopped at 05/19/22 1030    heparin (porcine) injection 5,000 Units  5,000 Units SubCUTAneous TID Stiven Cabezas MD   5,000 Units at 05/20/22 0741    Peppermint Spirit SPRT Does not apply PRN Jean Betancourt MD        menthol-zinc oxide (CALMOSEPTINE) 0.44-20.6 % ointment   Topical BID Greyson Salvador MD   Given at 05/20/22 0602    sodium phosphate 10.5 mmol in sodium chloride 0.9 % 250 mL IVPB  0.16 mmol/kg IntraVENous PRN Jean Betancourt MD        Or    sodium phosphate 21 mmol in sodium chloride 0.9 % 250 mL IVPB  0.32 mmol/kg IntraVENous PRN Jean Betancourt MD        hydrALAZINE (APRESOLINE) tablet 100 mg  100 mg Oral 3 times per day Jean Betancourt MD   100 mg at 05/20/22 0602    glucagon (rDNA) injection 1 mg  1 mg IntraMUSCular PRN Jean Betancourt MD        dextrose 5 % solution  100 mL/hr IntraVENous PRN Jean Betancourt MD        glucose chewable tablet 16 g  4 tablet Oral PRN Jean Betancourt MD        dextrose bolus (hypoglycemia) 10% 125 mL  125 mL IntraVENous PRN Jean Betancourt MD   Stopped at 05/11/22 2016    Or    dextrose bolus (hypoglycemia) 10% 250 mL  250 mL IntraVENous PRN Jean Betancourt MD   Stopped at 05/10/22 2026    LORazepam (ATIVAN) injection 1 mg  1 mg IntraVENous Q5 Min PRN Jean Betancourt MD        ondansetron (ZOFRAN-ODT) disintegrating tablet 4 mg  4 mg Oral Q8H PRN Jean Betancourt MD        Or    ondansetron [facility]) injection 4 mg  4 mg IntraVENous Q6H PRN Jean Betancourt MD        polyethylene glycol [facility]) packet 17 g  17 g Oral Daily PRN Jean Betancourt MD        acetaminophen (TYLENOL) tablet 650 mg  650 mg Oral Q6H PRN Jean Betancourt MD   650 mg at 04/25/22 0035    Or    acetaminophen (TYLENOL) suppository 650 mg  650 mg Rectal Q6H PRN Jean Betancourt MD        chlorhexidine (PERIDEX) 0.12 % solution 15 mL  15 mL Mouth/Throat BID Jean Betancourt MD   15 mL at 05/19/22 2105    famotidine (PEPCID) 20 mg in sodium chloride (PF) 10 mL injection  20 mg IntraVENous Daily Jean Betancourt MD   20 mg at 05/20/22 0741    magic butt cream   Topical Q4H PRN Jean Betancourt MD   Given at 05/03/22 2169    magnesium sulfate 1000 mg in dextrose 5% 100 mL IVPB  1,000 mg IntraVENous PRN Kurtis Day MD        potassium bicarb-citric acid (EFFER-K) effervescent tablet 40 mEq  40 mEq Oral PRN Kurtis Day MD        Or    potassium chloride 10 mEq/100 mL IVPB (Peripheral Line)  10 mEq IntraVENous PRN Kurtis Day MD   Stopped at 04/25/22 0265       Past Medical History:  Past Medical History:   Diagnosis Date    Acute ischemic stroke ([facility] 4/30/2022    Arthritis     Chronic kidney disease, stage 5, kidney failure (HCC)     Closed fracture of right shoulder girdle, with routine healing, subsequent encounter     DM (diabetes mellitus) type II controlled with renal manifestation ([facility] 06/1993    Gastroparesis     GERD (gastroesophageal reflux disease)     Hemodialysis patient ([facility]     dialysis on tues, thur, and sat at [facility]    Hypertension     Hypothyroid     Nasal congestion     recent    Neuropathy     Noncompliance with medications     Palliative care patient 10/08/2019    Restless leg syndrome        Past Surgical History:  Past Surgical History:   Procedure Laterality Date    BREAST SURGERY Left 2008    infected milk gland removed    BREAST SURGERY Right 5/25/2021    WEDGE EXCISION RIGHT BREAST DUCT WITH LACRIMAL DUCT PROBE, PEC BLOCK performed by April Stoner MD at [facility], LAPAROSCOPIC      2008    COLONOSCOPY Left 9/17/2020    Dr Matheus Allen hepatic flexure-Severe tortuosity with severe spasming, 1 yr recall    DIALYSIS FISTULA CREATION Left 1/25/2019    REVISION LEFT UPPER EXTREMITY AV ACCESS WITH LEFT BRACHIAL ARTERY TO LEFT AXILLARY VEIN WITH  INTERPOSITIONAL ARTEGRAFT   performed by Reza Geronimo MD at [address]  2012    GASTROSTOMY TUBE PLACEMENT N/A 5/10/2022    PERCUTANEOUS ENDOSCOPIC GASTROSTOMY TUBE PLACEMENT, performed by Kurtis Day MD at [address] Right 1/25/2019    INSERTION OF RIGHT INTERNAL JUGULAR HEMODIALYSIS CATHETER performed by Myrna Lee MD at [address] [facility]  2018    1.  Moderately increased stainable iron identified by special stain in Kupffer cells and occasional hepatocytes. Negative for evidence of significant portal or lobular inflammation. Negative for evidence of significant fibrosis    NC COLONOSCOPY W/BIOPSY SINGLE/MULTIPLE N/A 10/20/2017    Dr Silva Patches prep-Tubular AP (-) dysplasia x 2--3 yr recall    NC EGD TRANSORAL BIOPSY SINGLE/MULTIPLE N/A 2016    Dr Sarai Kohli EGD TRANSORAL BIOPSY SINGLE/MULTIPLE N/A 10/20/2017    Dr Devin Ahn (-)    TRACHEOSTOMY  5/10/2022    TRACHEOSTOMY PERCUTANEOUS performed by Nati Pope MD at [facility] VASCULAR SURGERY Left 2016    fistula by Dr Summer Crandall at [address]  10/02/2017    [facility]. Left upper fistulograms/venograms, balloon angioplasty cephalic vein arch 26W60 conquest.    VASCULAR SURGERY  2018    FISTULOGRAM    VASCULAR SURGERY  10/29/2018    [facility]. Left upper fistulograms/venograms    VASCULAR SURGERY  2018    [facility]. Arch aortogram,left upper arteriograms.  VASCULAR SURGERY  2019    [facility]. Removal of tunneled dialyis catheter right internal jugular vein.  VASCULAR SURGERY  2019    [facility]. Left upper extremity fistulogram including venography to the superior vena cava. Balloon angioplasty left subclavian vein with 10mm x 40mm conquest balloon. Balloon angioplasty mid/proximal upper arm cephalic vein with 66QU x 40mm conquest balloon. Venograms after balloon angioplasty. Stent left mid/proximal upper arm cephalic vein stenosis fluency 10mm x 60mm self-expanding covered stent. Balloon     VASCULAR SURGERY  2019    cont angioplasty stent with 10mm x 40mm conquest balloon. Completion venograms left upper extremity.        Family History  Family History   Problem Relation Age of Onset    Colon Cancer Mother          at 63    Colon Polyps Mother     Lung Cancer Father          at 66    Colon Polyps Father     Stomach Cancer Sister     Breast Cancer Sister 27    Depression Daughter 25        committed suicide    Liver Cancer Neg Hx     Liver Disease Neg Hx     Esophageal Cancer Neg Hx     Rectal Cancer Neg Hx        Social History  Social History     Socioeconomic History    Marital status: Single     Spouse name: Not on file    Number of children: 2    Years of education: Not on file    Highest education level: Not on file   Occupational History    Not on file   Tobacco Use    Smoking status: Current Every Day Smoker     Packs/day: 0.50     Years: 33.00     Pack years: 16.50     Types: Cigarettes    Smokeless tobacco: Never Used    Tobacco comment: NOW at 1/4 PD, started at age 24 and has averaged 2 PPD   Vaping Use    Vaping Use: Never used   Substance and Sexual Activity    Alcohol use: No    Drug use: Not Currently     Types: Marijuana [employer])    Sexual activity: Not Currently     Partners: Male   Other Topics Concern    Not on file   Social History Narrative    Not on file     Social Determinants of Health     Financial Resource Strain:     Difficulty of Paying Living Expenses: Not on file   Food Insecurity:     Worried About Running Out of Food in the Last Year: Not on file    Kendy of Food in the Last Year: Not on file   Transportation Needs:     Lack of Transportation (Medical): Not on file    Lack of Transportation (Non-Medical):  Not on file   Physical Activity:     Days of Exercise per Week: Not on file    Minutes of Exercise per Session: Not on file   Stress:     Feeling of Stress : Not on file   Social Connections:     Frequency of Communication with Friends and Family: Not on file    Frequency of Social Gatherings with Friends and Family: Not on file    Attends Yazdanism Services: Not on file    Active Member of Clubs or Organizations: Not on file    Attends Club or Organization Meetings: Not on file    Marital Status: Not on file   Intimate Partner Violence:     Fear of Current or Ex-Partner: Not on file    Emotionally Abused: Not on file    Physically Abused: Not on file    Sexually Abused: Not on file   Housing Stability:     Unable to Pay for Housing in the Last Year: Not on file    Number of Nicolette in the Last Year: Not on file    Unstable Housing in the Last Year: Not on file         Review of Systems:  Review of system is unobtainable as she is currently on a ventilator and poor neurological response.       Objective:  Patient Vitals for the past 24 hrs:   BP Temp Temp src Pulse Resp SpO2 Height Weight   05/20/22 0900 (!) 143/70   85 17 100 %     05/20/22 0800 (!) 170/73 97.6 °F (36.4 °C) Temporal 92 14 96 %     05/20/22 0700 (!) 173/73   93 14 95 %     05/20/22 0643     14 97 %     05/20/22 0630     14 95 %     05/20/22 0600 (!) 165/68   92 14 91 %     05/20/22 0500 (!) 171/69   101 14   139 lb 4.8 oz (63.2 kg)   05/20/22 0400 (!) 177/72 98.5 °F (36.9 °C) Temporal 96 15 94 %     05/20/22 0300 (!) 183/75   95 14 94 %     05/20/22 0228    92 23 95 %     05/20/22 0200 (!) 175/76   91 14 94 %     05/20/22 0100 (!) 177/77   91 14 (!) 86 %     05/20/22 0000 (!) 161/68 98.7 °F (37.1 °C) Temporal 86 14 98 %     05/19/22 2300 (!) 162/69   90 16 94 %     05/19/22 2247     14 93 %     05/19/22 2246    96 16 (!) 89 %     05/19/22 2200 (!) 160/65   92 15 95 %     05/19/22 2158    91 14 95 %     05/19/22 2100 (!) 168/71   88 14 91 %     05/19/22 2000 (!) 169/68 98.6 °F (37 °C) Temporal 86 14 96 %     05/19/22 1900 (!) 167/69   84 14 96 %     05/19/22 1836    78 14 96 %     05/19/22 1801 (!) 157/69   80 14 96 %     05/19/22 1800    80 14 96 %     05/19/22 1700 (!) 153/76   83 14 96 %     05/19/22 1601 135/69 99.1 °F (37.3 °C) Temporal 83 14 94 %     05/19/22 1600    84 14      05/19/22 1507 135/69        05/19/22 1500 135/69   87 (!) 36 (!) 87 %     05/19/22 1442     (!) 38 91 %     05/19/22 1441     (!) 38 (!) 89 %     05/19/22 1438 (!) 131/58          05/19/22 1400 (!) 131/58   87 27 93 %     05/19/22 1300 (!) 126/50   87 26 94 %     05/19/22 1200 (!) 122/55 98.9 °F (37.2 °C) Temporal 93 28 94 %     05/19/22 1131    95  93 %     05/19/22 1130    95 14 94 %     05/19/22 1115       5' 6\" (1.676 m)    05/19/22 1100 (!) 114/57   92 14 93 %     05/19/22 1024    95 14 92 %     05/19/22 1000 (!) 112/52   95 17 91 %         Intake/Output Summary (Last 24 hours) at 5/20/2022 0954  Last data filed at 5/20/2022 0400  Gross per 24 hour   Intake 1024.8 ml   Output    Net 1024.8 ml     General: Sleepy/remains on ventilator via tracheostomy. HEENT: Normocephalic atraumatic head  Neck: Supple without JVD or carotid bruits/midline tracheostomy connected to ventilator. Chest: Bilateral clear breath sounds. CVS: Regular rate and rhythm S1 and S2. Abdominal: Soft and nondistended positive bowel sounds. Extremities: No pedal edema. Skin: warm and dry without rash    Labs:  BMP:   Recent Labs     05/18/22  0108 05/18/22  0108 05/19/22  0127 05/19/22  1401 05/20/22  0319   *  --  132*  --  132*   K 4.8   < > 4.4 4.3 4.7   CL 90*  --  90*  --  88*   CO2 24  --  26  --  25   PHOS 5.8*  --  3.8  --  5.2*   BUN 67*  --  39*  --  63*   CREATININE 3.0*  --  2.1*  --  2.9*   CALCIUM 9.3  --  8.9  --  9.1    < > = values in this interval not displayed.      CBC:   Recent Labs     05/18/22  0108 05/19/22  0127 05/20/22  0319   WBC 11.6* 11.1* 9.6   HGB 8.2* 8.1* 7.9*   HCT 26.9* 26.7* 25.5*   MCV 95.1 97.8 94.4    217 210     LIVER PROFILE:   Recent Labs     05/18/22 0108 05/19/22  0127 05/20/22 0319   AST 8 12 11   ALT <5* 6 6   BILITOT 0.4 0.4 0.4   ALKPHOS 105* 109* 111*     PT/INR:   Recent Labs     05/18/22  0108 05/19/22  0641 05/20/22 0319   PROTIME 14.1 14.5 14.5   INR 1.09 1.13 1.13     APTT: No results for input(s): APTT in the last 72 hours. BNP:  No results for input(s): BNP in the last 72 hours. Ionized Calcium:No results for input(s): IONCA in the last 72 hours. Magnesium:  Recent Labs     05/18/22  0108 05/19/22 0127 05/20/22 0319   MG 2.5 2.4 2.5     Phosphorus:  Recent Labs     05/18/22 0108 05/19/22 0127 05/20/22 0319   PHOS 5.8* 3.8 5.2*     HgbA1C:   No results for input(s): LABA1C in the last 72 hours. Hepatic:   Recent Labs     05/18/22 0108 05/19/22 0127 05/20/22 0319   ALKPHOS 105* 109* 111*   ALT <5* 6 6   AST 8 12 11   PROT 6.4* 5.9* 5.9*   BILITOT 0.4 0.4 0.4   LABALBU 2.7* 2.8* 2.8*     Lactic Acid: No results for input(s): LACTA in the last 72 hours. Troponin: No results for input(s): CKTOTAL, CKMB, TROPONINT in the last 72 hours. ABGs:   Lab Results   Component Value Date    PHART 7.620 05/19/2022    PO2ART 127.0 05/19/2022    ULS0VZR 28.0 05/19/2022     CRP:  No results for input(s): CRP in the last 72 hours. Sed Rate:  No results for input(s): SEDRATE in the last 72 hours. Culture Results:   Blood Culture Recent:   Recent Labs     04/24/22  1447   BC No growth after 5 days of incubation. Urine Culture Recent :   Recent Labs     04/24/22  1354   LABURIN No growth at 36-48 hours       Radiology reports as per the Radiologist  Radiology: CT Head WO Contrast    Result Date: 4/24/2022  CT HEAD WO CONTRAST 4/24/2022 2:01 PM HISTORY: Altered mental status COMPARISON: CT scan dated 11/18/2021 DOSE LENGTH PRODUCT: 766 mGy cm TECHNIQUE: Helical tomographic images of the brain were obtained without the use of intravenous contrast. Automated exposure control was also utilized to decrease patient radiation dose. FINDINGS: There is no evidence of evolving large vascular territory infarct. No visualized intra-axial or extra-axial hemorrhage. No mass lesion is identified. Normal size and configuration of the ventricular system. The basal cisterns are symmetric. Posterior fossa structures are unremarkable. The included orbits and their contents are unremarkable. Chronic paranasal sinus disease on the left. The visualized osseous structures and overlying soft tissues of the skull and face are unremarkable. 1. No acute intracranial process. Signed by Dr Kiarra Erazo    Result Date: 4/24/2022  XR CHEST PORTABLE 4/24/2022 4:27 PM HISTORY: ET tube, enteric tube  Technique: Single AP view of the chest COMPARISONS: 4/24/2022 FINDINGS: Endotracheal tube is identified with tip essentially touching the krysta. Enteric tube courses into the stomach with tip curled in the fundus back towards the gastroesophageal junction. The lungs are clear and well expanded. Heart size is stable. Pulmonary vasculature are nondilated. No pleural effusion or pneumothorax. 1. Endotracheal tube is deep, tip essentially touching the krysta. Lungs are clear and well expanded. I would recommend 2-3 cm withdrawal for a more optimal positioning. The results of this exam were discussed with Loly Geronimo (ICU nursing) on 4/24/2022 at 1640 hours Signed by Dr Isai Myers    XR CHEST PORTABLE    Result Date: 4/24/2022  XR CHEST PORTABLE 4/24/2022 1:17 PM HISTORY: ET tube placement  Technique: Single AP view of the chest COMPARISONS: 4/24/2022 FINDINGS: Status post endotracheal intubation. ET tube is in good position. Lungs are well-expanded. Enteric tube curls on itself in the midesophagus and then extends back up into the throat. Heart size is stable. Pulmonary vasculature are nondilated. 1. ET tube is in good position. Lungs are clear and well expanded. 2. Enteric tube curls on itself in the mid esophagus before extending back up into the throat. This will need repositioned.  The results of this exam were discussed with Dr. Francesco Cunha on 4/24/2022 at 1329 hours Signed by Dr Isai Myers    XR CHEST PORTABLE    Result Date: 4/24/2022  XR CHEST PORTABLE 4/24/2022 12:34 PM HISTORY: Altered mental status  Technique: Single AP view of the chest COMPARISONS: Chest exam dated 4/1/2022 FINDINGS: No lung consolidation. No pleural effusion or pneumothorax. Cardiomediastinal silhouette and pulmonary vasculature are unremarkable. No acute bony abnormality. Left subclavian region vascular stent with additional stent projecting over the left humerus. No acute cardiopulmonary process. Signed by Dr Danielle Peña    Result Date: 4/1/2022  XR CHEST PORTABLE 4/1/2022 12:35 PM HISTORY: Shortness of breath  Technique: Single AP view of the chest COMPARISONS: Chest exam dated 1/22/2022 FINDINGS: Cardiomegaly with central pulmonary vascular congestion, interstitial and alveolar edema as well as bilateral moderate layering pleural effusions. No pneumothorax. No acute bony abnormality. 1. Volume overload with interstitial and carie pulmonary edema as well as bilateral moderate pleural effusions. Signed by Dr Madalyn Hilliard   1. End-stage renal disease/seen on hemodialysis. 2.  Type II diabetic nephropathy. 3.  Acute on chronic respiratory failure, trach and vented. 4.  Status post grand mal seizure  5. Severe hyponatremia due to volume overload. 6.  Anemia of chronic kidney disease. 7.  Urinary tract infection. 8.  Pulmonary edema now has resolved. 9.  Hypokalemia  10. Acute ischemic brain infarct. 11.  Poorly controlled systolic and diastolic hypertension. Plan:  1. Tolerating dialysis well. 2.  As needed blood pressure medications needed with routine medicine. .  3.  Continue LANDON. 4.  Plan was discussed with LTAC coordinator.       Radhika Shay MD  05/20/22  9:54 AM

## 2022-05-20 NOTE — PROGRESS NOTES
sodium chloride      propofol Stopped (05/19/22 1030)    dextrose        gentamicin  3 mg/kg IntraVENous Once    [START ON 5/23/2022] vancomycin  500 mg IntraVENous Once    lisinopril  40 mg Per NG tube Daily    insulin glargine  8 Units SubCUTAneous Nightly    sevelamer  0.8 g Oral TID WC    insulin lispro  0-6 Units SubCUTAneous TID WC    insulin lispro  0-3 Units SubCUTAneous Nightly    levETIRAcetam  500 mg Per G Tube BID    atorvastatin  20 mg Oral Daily    rOPINIRole  4 mg Oral Nightly    NIFEdipine  90 mg Oral Daily    metoprolol succinate  100 mg Oral QAM AC    levothyroxine  150 mcg Oral Daily    isosorbide mononitrate  120 mg Oral Daily    DULoxetine  60 mg Oral Daily    collagenase   Topical Daily    sodium chloride flush  5-40 mL IntraVENous 2 times per day    vancomycin (VANCOCIN) intermittent dosing (placeholder)   Other RX Placeholder    aminoglycoside intermittent dosing (placeholder)   Other RX Placeholder    acetylcysteine  600 mg Inhalation BID    ipratropium-albuterol  1 ampule Inhalation Q4H    aspirin  81 mg Oral Daily    heparin (porcine)  5,000 Units SubCUTAneous TID    menthol-zinc oxide   Topical BID    hydrALAZINE  100 mg Oral 3 times per day    chlorhexidine  15 mL Mouth/Throat BID    famotidine (PEPCID) injection  20 mg IntraVENous Daily     LORazepam, sodium zirconium cyclosilicate, sodium chloride flush, sodium chloride, HYDROmorphone, HYDROmorphone, hydrALAZINE, racepinephrine HCl, sodium chloride nebulizer, Peppermint Spirit, sodium phosphate IVPB **OR** sodium phosphate IVPB, glucagon (rDNA), dextrose, glucose, dextrose bolus **OR** dextrose bolus, LORazepam, ondansetron **OR** ondansetron, polyethylene glycol, acetaminophen **OR** acetaminophen, magic butt cream, magnesium sulfate, potassium alternative oral replacement **OR** potassium chloride  Diet NPO Exceptions are: Sips of Water with Meds  ADULT TUBE FEEDING; PEG; Peptide Based High Protein; Continuous; 20; Yes; 10; Q 6 hours; 48; 0; Other (specify); no free water flush     Lab and other Data:     Recent Labs     05/18/22 0108 05/19/22 0127 05/20/22  0319   WBC 11.6* 11.1* 9.6   HGB 8.2* 8.1* 7.9*    217 210     Recent Labs     05/18/22 0108 05/18/22  0108 05/19/22  0127 05/19/22  1401 05/20/22  0319   *  --  132*  --  132*   K 4.8   < > 4.4 4.3 4.7   CL 90*  --  90*  --  88*   CO2 24  --  26  --  25   BUN 67*  --  39*  --  63*   CREATININE 3.0*  --  2.1*  --  2.9*   GLUCOSE 328*  --  199*  --  164*    < > = values in this interval not displayed. Recent Labs     05/18/22 0108 05/19/22 0127 05/20/22 0319   AST 8 12 11   ALT <5* 6 6   BILITOT 0.4 0.4 0.4   ALKPHOS 105* 109* 111*       Assessment/Plan   Principal Problem:    Acute ischemic stroke (HCC)  Active Problems:    Weakness    Severe malnutrition (HCC)    Gastroesophageal reflux disease without esophagitis    Acquired hypothyroidism    Anemia in chronic kidney disease (CKD)    Type 2 diabetes mellitus with chronic kidney disease on chronic dialysis, with long-term current use of insulin (HCC)    Acute encephalopathy    Respiratory failure ([address]    Uncontrolled type 2 diabetes mellitus with complication, with long-term current use of insulin (HCC)    End stage renal disease (HCC)    PVD (peripheral vascular disease) ([address]    ESRD on hemodialysis ([address]    Hyponatremia    Hyperglycemia due to type 2 diabetes mellitus ([address]    Uncontrolled hypertension    Seizure ([address]    Palliative care patient    Uncontrolled type 2 diabetes mellitus with hyperosmolar nonketotic hyperglycemia (HCC)    Altered mental state    Acute hypoxemic respiratory failure (HCC)    Generalized weakness    UTI (urinary tract infection)    Closed fracture of left distal femur (HCC)    History of infection with vancomycin resistant Enterococcus (VRE)  Resolved Problems:    * No resolved hospital problems.  *      Visit Summary:  Chart reviewed and discussed MRSA/Raoultella ornithinolytica/Proteus vulgaris respiratory positivity- mgmt per hospitalist.    Thank you for consulting Palliative Care and allowing us to participate in the care of this patient.    Time Spent Counseling > 50%:  YES                                   Total Time Spent with patient/family counseling, workup/treatment review, counseling and placement of orders/preparation of this note: 27 minutes    Electronically signed by ROMINA Costello CNP on 5/20/2022 at 2:18 PM    (Please note that portions of this note were completed with a voice recognition program.  Keira Hopper made to edit the dictations but occasionally words are mis-transcribed.)

## 2022-05-20 NOTE — PROGRESS NOTES
Hospitalist Progress Note  [facility]     Patient: Mahad Salcido  : 1970  MRN: [id number]  Code Status: Full Code    Hospital Day: 26   Date of Service: 2022    Subjective:   Patient seen and examined. Trached and sedated.     Past Medical History:   Diagnosis Date    Acute ischemic stroke ([facility] 2022    Arthritis     Chronic kidney disease, stage 5, kidney failure (HCC)     Closed fracture of right shoulder girdle, with routine healing, subsequent encounter     DM (diabetes mellitus) type II controlled with renal manifestation ([facility] 1993    Gastroparesis     GERD (gastroesophageal reflux disease)     Hemodialysis patient ([facility]     dialysis on tues, thur, and sat [facility]    Hypertension     Hypothyroid     Nasal congestion     recent    Neuropathy     Noncompliance with medications     Palliative care patient 10/08/2019    Restless leg syndrome        Past Surgical History:   Procedure Laterality Date    BREAST SURGERY Left     infected milk gland removed    BREAST SURGERY Right 2021    WEDGE EXCISION RIGHT BREAST DUCT WITH LACRIMAL DUCT PROBE, PEC BLOCK [facility] [address] [facility] LAPAROSCOP[facility]          COLONOSCOPY Left 2020    Dr Duane Azul hepatic flexure-Severe tortuosity with severe spasming, 1 yr recall    DIALYSIS FISTULA CREATION Left 2019    REVISION LEFT UPPER EXTREMITY AV ACCESS WITH LEFT BRACHIAL ARTERY TO LEFT AXILLARY VEIN WITH  INTERPOSITIONAL ARTEGRAFT   performed by Dilip Sylvester MD at [address]      GASTROSTOMY TUBE PLACEMENT N/A 5/10/2022    PERCUTANEOUS ENDOSCOPIC GASTROSTOMY TUBE PLACEMENT, performed by Laurell Closs, MD at [address] Right 2019    INSERTION OF RIGHT INTERNAL JUGULAR HEMODIALYSIS CATHETER performed by Dilip Sylvester MD at [address]  2018    1.  Moderately increased stainable iron identified by special stain in Kupffer cells and occasional hepatocytes. Negative for evidence of significant portal or lobular inflammation. Negative for evidence of significant fibrosis    MI COLONOSCOPY W/BIOPSY SINGLE/MULTIPLE N/A 10/20/2017    Dr Shahla Silva prep-Tubular AP (-) dysplasia x 2--3 yr recall    MI EGD TRANSORAL BIOPSY SINGLE/MULTIPLE N/A 2016    Dr Ellen Benson EGD TRANSORAL BIOPSY SINGLE/MULTIPLE N/A 10/20/2017    Dr Sara Dooley (-)    TRACHEOSTOMY  5/10/2022    TRACHEOSTOMY PERCUTANEOUS performed by Emily Wallace MD at [address] VASCULAR SURGERY Left 2016    fistula by Dr Overton Shows at [address]  10/02/2017    [facility]. Left upper fistulograms/venograms, balloon angioplasty cephalic vein arch [id number] conquest.    VASCULAR SURGERY  2018    FISTULOGRAM    VASCULAR SURGERY  10/29/2018    [facility]. Left upper fistulograms/venograms    VASCULAR SURGERY  2018    [facility]. Arch aortogram,left upper arteriograms.  VASCULAR SURGERY  2019    [facility]. Removal of tunneled dialyis catheter right internal jugular vein.  VASCULAR SURGERY  2019    [facility]. Left upper extremity fistulogram including venography to the superior vena cava. Balloon angioplasty left subclavian vein with 10mm x 40mm conquest balloon. Balloon angioplasty mid/proximal upper arm cephalic vein with 64FK x 40mm conquest balloon. Venograms after balloon angioplasty. Stent left mid/proximal upper arm cephalic vein stenosis fluency 10mm x 60mm self-expanding covered stent. Balloon     VASCULAR SURGERY  2019    cont angioplasty stent with 10mm x 40mm conquest balloon. Completion venograms left upper extremity.        Family History   Problem Relation Age of Onset    Colon Cancer Mother          at 63    Colon Polyps Mother     Lung Cancer Father          at 66    Colon Polyps Father     Stomach Cancer Sister     Breast Cancer Sister 27    Depression Daughter 25 committed suicide    Liver Cancer Neg Hx     Liver Disease Neg Hx     Esophageal Cancer Neg Hx     Rectal Cancer Neg Hx        Social History     Socioeconomic History    Marital status: Single     Spouse name: Not on file    Number of children: 2    Years of education: Not on file    Highest education level: Not on file   Occupational History    Not on file   Tobacco Use    Smoking status: Current Every Day Smoker     Packs/day: 0.50     Years: 33.00     Pack years: 16.50     Types: Cigarettes    Smokeless tobacco: Never Used    Tobacco comment: NOW at 1/4 PD, started at age 24 and has averaged 2 PPD   Vaping Use    Vaping Use: Never used   Substance and Sexual Activity    Alcohol use: No    Drug use: Not Currently     Types: Marijuana [place])    Sexual activity: Not Currently     Partners: Male   Other Topics Concern    Not on file   Social History Narrative    Not on file     Social Determinants of Health     Financial Resource Strain:     Difficulty of Paying Living Expenses: Not on file   Food Insecurity:     Worried About Running Out of Food in the Last Year: Not on file    Kendy of Food in the Last Year: Not on file   Transportation Needs:     Lack of Transportation (Medical): Not on file    Lack of Transportation (Non-Medical):  Not on file   Physical Activity:     Days of Exercise per Week: Not on file    Minutes of Exercise per Session: Not on file   Stress:     Feeling of Stress : Not on file   Social Connections:     Frequency of Communication with Friends and Family: Not on file    Frequency of Social Gatherings with Friends and Family: Not on file    Attends Taoism Services: Not on file    Active Member of Clubs or Organizations: Not on file    Attends Club or Organization Meetings: Not on file    Marital Status: Not on file   Intimate Partner Violence:     Fear of Current or Ex-Partner: Not on file    Emotionally Abused: Not on file    Physically Abused: Not on file    Sexually Abused: Not on file   Housing Stability:     Unable to Pay for Housing in the Last Year: Not on file    Number of Places Lived in the Last Year: Not on file    Unstable Housing in the Last Year: Not on file       Current Facility-Administered Medications   Medication Dose Route Frequency Provider Last Rate Last Admin    lisinopril (PRINIVIL;ZESTRIL) tablet 40 mg  40 mg Per NG tube Daily Thuy Jin MD   40 mg at 05/19/22 1438    LORazepam (ATIVAN) injection 0.5 mg  0.5 mg IntraVENous Q6H PRN Lisandra Leon DO        insulin glargine (LANTUS) injection vial 8 Units  8 Units SubCUTAneous Nightly Andre Schrader MD   8 Units at 05/19/22 2104    sodium zirconium cyclosilicate (LOKELMA) oral suspension 10 g  10 g Oral TID PRN Thuy Jin MD        sevelamer (RENVELA) packet 0.8 g  0.8 g Oral TID  Andre Schrader MD   0.8 g at 05/20/22 0741    insulin lispro (HUMALOG) injection vial 0-6 Units  0-6 Units SubCUTAneous TID  Andre Schrader MD   1 Units at 05/19/22 1159    insulin lispro (HUMALOG) injection vial 0-3 Units  0-3 Units SubCUTAneous Nightly Andre Schrader MD   1 Units at 05/19/22 2104    levETIRAcetam (KEPPRA) 100 MG/ML solution 500 mg  500 mg Per G Tube BID Myah Rodriguez MD   500 mg at 05/20/22 0741    atorvastatin (LIPITOR) tablet 20 mg  20 mg Oral Daily Andre Schrader MD   20 mg at 05/20/22 0740    rOPINIRole (REQUIP) tablet 4 mg  4 mg Oral Nightly Andre Schrader MD   4 mg at 05/19/22 2103    NIFEdipine (PROCARDIA XL) extended release tablet 90 mg  90 mg Oral Daily Andre Schrader MD   90 mg at 05/19/22 0736    metoprolol succinate (TOPROL XL) extended release tablet 100 mg  100 mg Oral QAM AC Andre Schrader MD   100 mg at 05/19/22 0550    levothyroxine (SYNTHROID) tablet 150 mcg  150 mcg Oral Daily Andre Schrader MD   150 mcg at 05/20/22 0741    isosorbide mononitrate (IMDUR) extended release tablet 120 mg  120 mg Oral Daily Andre Schrader MD   120 mg at 05/19/22 2711    DULoxetine (CYMBALTA) extended release capsule 60 mg  60 mg Oral Daily Dustin Solis MD   60 mg at 05/20/22 0740    collagenase ointment   Topical Daily Dustin Solis MD   Given at 05/19/22 0741    sodium chloride flush 0.9 % injection 5-40 mL  5-40 mL IntraVENous 2 times per day Reginaldo Spear MD   10 mL at 05/19/22 2104    sodium chloride flush 0.9 % injection 5-40 mL  5-40 mL IntraVENous PRN Reginaldo Spear MD        0.9 % sodium chloride infusion   IntraVENous PRN Reginaldo Spear MD        HYDROmorphone HCl PF (DILAUDID) injection 0.25 mg  0.25 mg IntraVENous Q5 Min PRN Reginaldo Spear MD        HYDROmorphone HCl PF (DILAUDID) injection 0.5 mg  0.5 mg IntraVENous Q5 Min PRN Reginaldo Spear MD        [facility]) intermittent dosing (placeholder)   Other RX Placeholder Kiesha Craig MD        aminoglycoside intermittent dosing (placeholder)   Other RX Placeholder Kiesha Craig MD        acetylcysteine (MUCOMYST) 20 % solution 600 mg  600 mg Inhalation BID Kiesha Craig MD   600 mg at 05/20/22 0630    ipratropium-albuterol (DUONEB) nebulizer solution 1 ampule  1 ampule Inhalation Q4H Kiesha Craig MD   1 ampule at 05/20/22 1027    hydrALAZINE (APRESOLINE) injection 10 mg  10 mg IntraVENous Q6H PRN Kiesha Craig MD   10 mg at 05/20/22 0324    aspirin chewable tablet 81 mg  81 mg Oral Daily Kiesha Craig MD   81 mg at 05/20/22 0741    racepinephrine HCl (VAPONEFPRIN) 2.25 % nebulizer solution NEBU 11.25 mg  11.25 mg Nebulization Q4H PRN Kiesha Craig MD   11.25 mg at 05/03/22 1408    sodium chloride nebulizer 0.9 % solution 3 mL  3 mL Nebulization Q4H PRN Kiesha Craig MD        propofol injection  5-50 mcg/kg/min IntraVENous Continuous Kiesha Craig MD   Stopped at 05/19/22 1030    heparin (porcine) injection 5,000 Units  5,000 Units SubCUTAneous TID Kiesha Craig MD   5,000 Units at 05/20/22 0741    Peppermint Spirit SPRT   Does not apply PRN Phylliss Flight Joellen Simmons MD        menthol-zinc oxide (CALMOSEPTINE) 0.44-20.6 % ointment   Topical BID Kaylie Damian MD   Given at 05/20/22 0602    sodium phosphate 10.5 mmol in sodium chloride 0.9 % 250 mL IVPB  0.16 mmol/kg IntraVENous PRN Vitaliy Russell MD        Or    sodium phosphate 21 mmol in sodium chloride 0.9 % 250 mL IVPB  0.32 mmol/kg IntraVENous PRN Vitaliy Russell MD        hydrALAZINE (APRESOLINE) tablet 100 mg  100 mg Oral 3 times per day Vitaliy Russell MD   100 mg at 05/20/22 0602    glucagon (rDNA) injection 1 mg  1 mg IntraMUSCular PRN Vitaliy Russell MD        dextrose 5 % solution  100 mL/hr IntraVENous PRN Vitaliy Russell MD        glucose chewable tablet 16 g  4 tablet Oral PRN Vitaliy Russell MD        dextrose bolus (hypoglycemia) 10% 125 mL  125 mL IntraVENous DESMOND Russell MD   Stopped at 05/11/22 2016    Or    dextrose bolus (hypoglycemia) 10% 250 mL  250 mL IntraVENous DESMOND Russell MD   Stopped at 05/10/22 2026    LORazepam (ATIVAN) injection 1 mg  1 mg IntraVENous Q5 Min PRN Vitaliy Russell MD        ondansetron (ZOFRAN-ODT) disintegrating tablet 4 mg  4 mg Oral Q8H PRDAVI Russell MD        Or    ondansetron [facility]) injection 4 mg  4 mg IntraVENous Q6H PRN Vitaliy Russell MD        polyethylene glycol [facility]) packet 17 g  17 g Oral Daily PRN Vitaliy Russell MD        acetaminophen (TYLENOL) tablet 650 mg  650 mg Oral Q6H PRN Vitaliy Russell MD   650 mg at 04/25/22 0035    Or    acetaminophen (TYLENOL) suppository 650 mg  650 mg Rectal Q6H PRDAVI Russell MD        chlorhexidine (PERIDEX) 0.12 % solution 15 mL  15 mL Mouth/Throat BID Vitaliy Russell MD   15 mL at 05/19/22 2105    famotidine (PEPCID) 20 mg in sodium chloride (PF) 10 mL injection  20 mg IntraVENous Daily Vitaliy Russell MD   20 mg at 05/20/22 0741    magic butt cream   Topical Q4H PRN Vitaliy Russell MD   Given at 05/03/22 0940    magnesium sulfate 1000 mg in dextrose 5% 100 mL IVPB  1,000 mg IntraVENous PRN Tequila Espinoza MD        potassium bicarb-citric acid (EFFER-K) effervescent tablet 40 mEq  40 mEq Oral PRN Tequila Espinoza MD        Or    potassium chloride 10 mEq/100 mL IVPB (Peripheral Line)  10 mEq IntraVENous PRN Tequila Espinoza MD   Stopped at 04/25/22 0936         sodium chloride      propofol Stopped (05/19/22 1030)    dextrose          Objective:   BP (!) 139/59   Pulse 83   Temp 97.6 °F (36.4 °C) (Temporal)   Resp (!) 33   Ht 5' 6\" (1.676 m)   Wt 139 lb 4.8 oz (63.2 kg)   SpO2 100%   BMI 22.48 kg/m²     General: no acute distress  HEENT: normocephalic, atraumatic  Lungs: faint rhonchi  Cardiovascular: s1 and s2 normal  Abdomen: soft, positive bowel sounds  Extremities: no cyanosis   Neuro: trached and sedated    Recent Labs     05/18/22 0108 05/19/22 0127 05/20/22  0319   WBC 11.6* 11.1* 9.6   RBC 2.83* 2.73* 2.70*   HGB 8.2* 8.1* 7.9*   HCT 26.9* 26.7* 25.5*   MCV 95.1 97.8 94.4   MCH 29.0 29.7 29.3   MCHC 30.5* 30.3* 31.0*    217 210     Recent Labs     05/18/22 0108 05/18/22 0108 05/19/22 0127 05/19/22  1401 05/20/22  0319   *  --  132*  --  132*   K 4.8   < > 4.4 4.3 4.7   ANIONGAP 19  --  16  --  19   CL 90*  --  90*  --  88*   CO2 24  --  26  --  25   BUN 67*  --  39*  --  63*   CREATININE 3.0*  --  2.1*  --  2.9*   GLUCOSE 328*  --  199*  --  164*   CALCIUM 9.3  --  8.9  --  9.1    < > = values in this interval not displayed. Recent Labs     05/18/22 0108 05/19/22  0127 05/20/22  0319   MG 2.5 2.4 2.5   PHOS 5.8* 3.8 5.2*     Recent Labs     05/18/22  0108 05/19/22  0127 05/20/22  0319   AST 8 12 11   ALT <5* 6 6   BILITOT 0.4 0.4 0.4   ALKPHOS 105* 109* 111*     No results for input(s): PH, PO2, PCO2, HCO3, BE, O2SAT in the last 72 hours. No results for input(s): TROPONINI in the last 72 hours.   Recent Labs     05/18/22  0108 05/19/22  0641 05/20/22  0319   INR 1.09 1.13 1.13     No results for input(s): LACTA in the last 72 hours.      Intake/Output Summary (Last 24 hours) at 5/20/2022 1048  Last data filed at 5/20/2022 1000  Gross per 24 hour   Intake 1028 ml   Output 0 ml   Net 1028 ml       No results found.      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 Camille Glaser [facility] trials     ESRD on HD  Renal following     Medical noncompliance       C. difficile infection  Completed oral vancomycin course     Tobacco dependence       IDDM 2  Meds on board     DVT prophylaxis  Heparin     Dispo:  LTAC eval in progress    Total critical care time: 40 minutes    Keith Baker MD   5/20/2022 10:48 AM

## 2022-05-20 NOTE — PROGRESS NOTES
Patient placed back on vent for high respiratory rate of 36. Saturation never dropped on trach collar. Placed on cpap mode to help patient rest for awhile. Patient now breathing 25 times a minute.  Saturation 99%

## 2022-05-21 LAB
ALBUMIN SERPL-MCNC: 3 G/DL (ref 3.5–5.2)
ALP BLD-CCNC: 108 U/L (ref 35–104)
ALT SERPL-CCNC: 6 U/L (ref 5–33)
ANION GAP SERPL CALCULATED.3IONS-SCNC: 14 MMOL/L (ref 7–19)
AST SERPL-CCNC: 11 U/L (ref 5–32)
BASOPHILS ABSOLUTE: 0.1 K/UL (ref 0–0.2)
BASOPHILS RELATIVE PERCENT: 0.9 % (ref 0–1)
BILIRUB SERPL-MCNC: 0.4 MG/DL (ref 0.2–1.2)
BUN BLDV-MCNC: 33 MG/DL (ref 6–20)
CALCIUM SERPL-MCNC: 9 MG/DL (ref 8.6–10)
CHLORIDE BLD-SCNC: 95 MMOL/L (ref 98–111)
CO2: 28 MMOL/L (ref 22–29)
CREAT SERPL-MCNC: 1.8 MG/DL (ref 0.5–0.9)
EOSINOPHILS ABSOLUTE: 0.2 K/UL (ref 0–0.6)
EOSINOPHILS RELATIVE PERCENT: 2.4 % (ref 0–5)
GFR AFRICAN AMERICAN: 36
GFR NON-AFRICAN AMERICAN: 29
GLUCOSE BLD-MCNC: 172 MG/DL (ref 70–99)
GLUCOSE BLD-MCNC: 182 MG/DL (ref 70–99)
GLUCOSE BLD-MCNC: 186 MG/DL (ref 70–99)
GLUCOSE BLD-MCNC: 187 MG/DL (ref 74–109)
GLUCOSE BLD-MCNC: 228 MG/DL (ref 70–99)
GLUCOSE BLD-MCNC: 282 MG/DL (ref 70–99)
HCT VFR BLD CALC: 24.4 % (ref 37–47)
HEMOGLOBIN: 7.3 G/DL (ref 12–16)
IMMATURE GRANULOCYTES #: 0 K/UL
LYMPHOCYTES ABSOLUTE: 0.7 K/UL (ref 1.1–4.5)
LYMPHOCYTES RELATIVE PERCENT: 9.1 % (ref 20–40)
MAGNESIUM: 2.2 MG/DL (ref 1.6–2.6)
MCH RBC QN AUTO: 29 PG (ref 27–31)
MCHC RBC AUTO-ENTMCNC: 29.9 G/DL (ref 33–37)
MCV RBC AUTO: 96.8 FL (ref 81–99)
MONOCYTES ABSOLUTE: 0.4 K/UL (ref 0–0.9)
MONOCYTES RELATIVE PERCENT: 6 % (ref 0–10)
NEUTROPHILS ABSOLUTE: 6 K/UL (ref 1.5–7.5)
NEUTROPHILS RELATIVE PERCENT: 81.3 % (ref 50–65)
PDW BLD-RTO: 16.7 % (ref 11.5–14.5)
PERFORMED ON: ABNORMAL
PHOSPHORUS: 3.5 MG/DL (ref 2.5–4.5)
PLATELET # BLD: 215 K/UL (ref 130–400)
PMV BLD AUTO: 10.3 FL (ref 9.4–12.3)
POTASSIUM SERPL-SCNC: 4.1 MMOL/L (ref 3.5–5)
RBC # BLD: 2.52 M/UL (ref 4.2–5.4)
SODIUM BLD-SCNC: 137 MMOL/L (ref 136–145)
TOTAL PROTEIN: 5.8 G/DL (ref 6.6–8.7)
WBC # BLD: 7.4 K/UL (ref 4.8–10.8)

## 2022-05-21 PROCEDURE — 84100 ASSAY OF PHOSPHORUS: CPT

## 2022-05-21 PROCEDURE — 2700000000 HC OXYGEN THERAPY PER DAY

## 2022-05-21 PROCEDURE — 2500000003 HC RX 250 WO HCPCS: Performed by: SURGERY

## 2022-05-21 PROCEDURE — 80053 COMPREHEN METABOLIC PANEL: CPT

## 2022-05-21 PROCEDURE — 36415 COLL VENOUS BLD VENIPUNCTURE: CPT

## 2022-05-21 PROCEDURE — 82947 ASSAY GLUCOSE BLOOD QUANT: CPT

## 2022-05-21 PROCEDURE — 85025 COMPLETE CBC W/AUTO DIFF WBC: CPT

## 2022-05-21 PROCEDURE — 6360000002 HC RX W HCPCS: Performed by: SURGERY

## 2022-05-21 PROCEDURE — 6370000000 HC RX 637 (ALT 250 FOR IP): Performed by: INTERNAL MEDICINE

## 2022-05-21 PROCEDURE — 6370000000 HC RX 637 (ALT 250 FOR IP): Performed by: SURGERY

## 2022-05-21 PROCEDURE — 94003 VENT MGMT INPAT SUBQ DAY: CPT

## 2022-05-21 PROCEDURE — 6370000000 HC RX 637 (ALT 250 FOR IP): Performed by: PSYCHIATRY & NEUROLOGY

## 2022-05-21 PROCEDURE — 94640 AIRWAY INHALATION TREATMENT: CPT

## 2022-05-21 PROCEDURE — 2580000003 HC RX 258: Performed by: SURGERY

## 2022-05-21 PROCEDURE — 83735 ASSAY OF MAGNESIUM: CPT

## 2022-05-21 PROCEDURE — 99232 SBSQ HOSP IP/OBS MODERATE 35: CPT | Performed by: PSYCHIATRY & NEUROLOGY

## 2022-05-21 PROCEDURE — 2000000000 HC ICU R&B

## 2022-05-21 PROCEDURE — 2580000003 HC RX 258: Performed by: ANESTHESIOLOGY

## 2022-05-21 PROCEDURE — 87040 BLOOD CULTURE FOR BACTERIA: CPT

## 2022-05-21 RX ORDER — BUSPIRONE HYDROCHLORIDE 5 MG/1
5 TABLET ORAL 3 TIMES DAILY
Status: DISCONTINUED | OUTPATIENT
Start: 2022-05-21 | End: 2022-05-25 | Stop reason: HOSPADM

## 2022-05-21 RX ADMIN — IPRATROPIUM BROMIDE AND ALBUTEROL SULFATE 1 AMPULE: 2.5; .5 SOLUTION RESPIRATORY (INHALATION) at 14:08

## 2022-05-21 RX ADMIN — Medication 10 ML: at 08:43

## 2022-05-21 RX ADMIN — CHLORHEXIDINE GLUCONATE 15 ML: 1.2 RINSE ORAL at 08:43

## 2022-05-21 RX ADMIN — HEPARIN SODIUM 5000 UNITS: 5000 INJECTION INTRAVENOUS; SUBCUTANEOUS at 19:24

## 2022-05-21 RX ADMIN — SEVELAMER CARBONATE 0.8 G: 800 POWDER, FOR SUSPENSION ORAL at 11:09

## 2022-05-21 RX ADMIN — COLLAGENASE SANTYL: 250 OINTMENT TOPICAL at 08:39

## 2022-05-21 RX ADMIN — SEVELAMER CARBONATE 0.8 G: 800 POWDER, FOR SUSPENSION ORAL at 08:42

## 2022-05-21 RX ADMIN — HYDRALAZINE HYDROCHLORIDE 100 MG: 50 TABLET, FILM COATED ORAL at 20:25

## 2022-05-21 RX ADMIN — ANORECTAL OINTMENT: 15.7; .44; 24; 20.6 OINTMENT TOPICAL at 08:41

## 2022-05-21 RX ADMIN — INSULIN LISPRO 2 UNITS: 100 INJECTION, SOLUTION INTRAVENOUS; SUBCUTANEOUS at 11:08

## 2022-05-21 RX ADMIN — IPRATROPIUM BROMIDE AND ALBUTEROL SULFATE 1 AMPULE: 2.5; .5 SOLUTION RESPIRATORY (INHALATION) at 10:06

## 2022-05-21 RX ADMIN — LEVETIRACETAM 500 MG: 500 SOLUTION ORAL at 08:38

## 2022-05-21 RX ADMIN — ACETYLCYSTEINE 600 MG: 200 SOLUTION ORAL; RESPIRATORY (INHALATION) at 18:10

## 2022-05-21 RX ADMIN — IPRATROPIUM BROMIDE AND ALBUTEROL SULFATE 1 AMPULE: 2.5; .5 SOLUTION RESPIRATORY (INHALATION) at 22:15

## 2022-05-21 RX ADMIN — IPRATROPIUM BROMIDE AND ALBUTEROL SULFATE 1 AMPULE: 2.5; .5 SOLUTION RESPIRATORY (INHALATION) at 06:26

## 2022-05-21 RX ADMIN — ATORVASTATIN CALCIUM 20 MG: 20 TABLET, FILM COATED ORAL at 08:39

## 2022-05-21 RX ADMIN — BUSPIRONE HYDROCHLORIDE 5 MG: 5 TABLET ORAL at 13:03

## 2022-05-21 RX ADMIN — HYDRALAZINE HYDROCHLORIDE 100 MG: 50 TABLET, FILM COATED ORAL at 14:46

## 2022-05-21 RX ADMIN — IPRATROPIUM BROMIDE AND ALBUTEROL SULFATE 1 AMPULE: 2.5; .5 SOLUTION RESPIRATORY (INHALATION) at 02:29

## 2022-05-21 RX ADMIN — INSULIN GLARGINE 8 UNITS: 100 INJECTION, SOLUTION SUBCUTANEOUS at 19:30

## 2022-05-21 RX ADMIN — ISOSORBIDE MONONITRATE 120 MG: 60 TABLET, EXTENDED RELEASE ORAL at 08:38

## 2022-05-21 RX ADMIN — ROPINIROLE HYDROCHLORIDE 4 MG: 4 TABLET, FILM COATED ORAL at 19:24

## 2022-05-21 RX ADMIN — LEVETIRACETAM 500 MG: 500 SOLUTION ORAL at 19:24

## 2022-05-21 RX ADMIN — CHLORHEXIDINE GLUCONATE 15 ML: 1.2 RINSE ORAL at 19:25

## 2022-05-21 RX ADMIN — ACETAMINOPHEN 650 MG: 325 TABLET ORAL at 16:27

## 2022-05-21 RX ADMIN — BUSPIRONE HYDROCHLORIDE 5 MG: 5 TABLET ORAL at 19:24

## 2022-05-21 RX ADMIN — HYDRALAZINE HYDROCHLORIDE 10 MG: 20 INJECTION INTRAMUSCULAR; INTRAVENOUS at 23:04

## 2022-05-21 RX ADMIN — IPRATROPIUM BROMIDE AND ALBUTEROL SULFATE 1 AMPULE: 2.5; .5 SOLUTION RESPIRATORY (INHALATION) at 18:10

## 2022-05-21 RX ADMIN — INSULIN LISPRO 3 UNITS: 100 INJECTION, SOLUTION INTRAVENOUS; SUBCUTANEOUS at 08:37

## 2022-05-21 RX ADMIN — LEVOTHYROXINE SODIUM 150 MCG: 75 TABLET ORAL at 08:38

## 2022-05-21 RX ADMIN — ACETYLCYSTEINE 600 MG: 200 SOLUTION ORAL; RESPIRATORY (INHALATION) at 06:26

## 2022-05-21 RX ADMIN — METOPROLOL SUCCINATE 100 MG: 50 TABLET, EXTENDED RELEASE ORAL at 08:39

## 2022-05-21 RX ADMIN — INSULIN LISPRO 1 UNITS: 100 INJECTION, SOLUTION INTRAVENOUS; SUBCUTANEOUS at 19:30

## 2022-05-21 RX ADMIN — SODIUM CHLORIDE, PRESERVATIVE FREE 20 MG: 5 INJECTION INTRAVENOUS at 08:38

## 2022-05-21 RX ADMIN — INSULIN LISPRO 1 UNITS: 100 INJECTION, SOLUTION INTRAVENOUS; SUBCUTANEOUS at 16:27

## 2022-05-21 RX ADMIN — HEPARIN SODIUM 5000 UNITS: 5000 INJECTION INTRAVENOUS; SUBCUTANEOUS at 08:38

## 2022-05-21 RX ADMIN — LISINOPRIL 40 MG: 20 TABLET ORAL at 08:39

## 2022-05-21 RX ADMIN — HEPARIN SODIUM 5000 UNITS: 5000 INJECTION INTRAVENOUS; SUBCUTANEOUS at 14:46

## 2022-05-21 RX ADMIN — Medication 10 ML: at 19:24

## 2022-05-21 RX ADMIN — ANORECTAL OINTMENT: 15.7; .44; 24; 20.6 OINTMENT TOPICAL at 16:53

## 2022-05-21 RX ADMIN — SEVELAMER CARBONATE 0.8 G: 800 POWDER, FOR SUSPENSION ORAL at 16:28

## 2022-05-21 RX ADMIN — HYDRALAZINE HYDROCHLORIDE 100 MG: 50 TABLET, FILM COATED ORAL at 05:50

## 2022-05-21 RX ADMIN — DULOXETINE 60 MG: 60 CAPSULE, DELAYED RELEASE ORAL at 08:39

## 2022-05-21 RX ADMIN — NIFEDIPINE 90 MG: 90 TABLET, FILM COATED, EXTENDED RELEASE ORAL at 08:39

## 2022-05-21 RX ADMIN — ASPIRIN 81 MG 81 MG: 81 TABLET ORAL at 08:38

## 2022-05-21 ASSESSMENT — PULMONARY FUNCTION TESTS
PIF_VALUE: 15
PIF_VALUE: 14
PIF_VALUE: 24
PIF_VALUE: 15
PIF_VALUE: 18
PIF_VALUE: 17
PIF_VALUE: 19
PIF_VALUE: 21
PIF_VALUE: 20
PIF_VALUE: 22
PIF_VALUE: 17
PIF_VALUE: 15
PIF_VALUE: 17
PIF_VALUE: 14
PIF_VALUE: 14
PIF_VALUE: 15
PIF_VALUE: 16

## 2022-05-21 NOTE — PROGRESS NOTES
Hospitalist Progress Note  [facility]     Patient: Nu Fiore  : 1970  MRN: [id number]  Code Status: Full Code    Hospital Day: 27   Date of Service: 2022    Subjective:   Patient seen and examined. Trached. Sedation vacation.     Past Medical History:   Diagnosis Date    Acute ischemic stroke ([facility] 2022    Arthritis     Chronic kidney disease, stage 5, kidney failure (HCC)     Closed fracture of right shoulder girdle, with routine healing, subsequent encounter     DM (diabetes mellitus) type II controlled with renal manifestation ([facility] 1993    Gastroparesis     GERD (gastroesophageal reflux disease)     Hemodialysis patient ([facility]     dialysis on tues, thur, and sat at [facility]    Hypertension     Hypothyroid     Nasal congestion     recent    Neuropathy     Noncompliance with medications     Palliative care patient 10/08/2019    Restless leg syndrome        Past Surgical History:   Procedure Laterality Date    BREAST SURGERY Left     infected milk gland removed    BREAST SURGERY Right 2021    WEDGE EXCISION RIGHT BREAST DUCT WITH LACRIMAL DUCT PROBE, PEC BLOCK performed by Candi Felton MD at [facility], LAPAROSCOPIC          COLONOSCOPY Left 2020    Dr Tiffanie Bolivar hepatic flexure-Severe tortuosity with severe spasming, 1 yr recall    DIALYSIS FISTULA CREATION Left 2019    REVISION LEFT UPPER EXTREMITY AV ACCESS WITH LEFT BRACHIAL ARTERY TO LEFT AXILLARY VEIN WITH  INTERPOSITIONAL ARTEGRAFT   performed by Av Meraz MD at [address]      GASTROSTOMY TUBE PLACEMENT N/A 5/10/2022    PERCUTANEOUS ENDOSCOPIC GASTROSTOMY TUBE PLACEMENT, performed by Annalee Rodriguez MD at [address] [place] [address] Right 2019    INSERTION OF RIGHT INTERNAL JUGULAR HEMODIALYSIS CATHETER performed by Av Meraz MD at [address] [facility]  2018    1.  Moderately increased stainable iron identified by special stain in Kupffer cells and occasional hepatocytes. Negative for evidence of significant portal or lobular inflammation. Negative for evidence of significant fibrosis    WI COLONOSCOPY W/BIOPSY SINGLE/MULTIPLE N/A 10/20/2017    Dr Ilya Drake prep-Tubular AP (-) dysplasia x 2--3 yr recall    WI EGD TRANSORAL BIOPSY SINGLE/MULTIPLE N/A 2016    Dr Dick Martinez EGD TRANSORAL BIOPSY SINGLE/MULTIPLE N/A 10/20/2017    Dr Armando Wong (-)    TRACHEOSTOMY  5/10/2022    TRACHEOSTOMY PERCUTANEOUS performed by Sergey Logan MD at [address] VASCULAR SURGERY Left 2016    fistula by Dr Franklin Muñoz at [address]  10/02/2017    [facility]. Left upper fistulograms/venograms, balloon angioplasty cephalic vein arch 44P72 conquest.    VASCULAR SURGERY  2018    FISTULOGRAM    VASCULAR SURGERY  10/29/2018    SJS. Left upper fistulograms/venograms    VASCULAR SURGERY  2018    SJS. Arch aortogram,left upper arteriograms.  VASCULAR SURGERY  2019    SJS. Removal of tunneled dialyis catheter right internal jugular vein.  VASCULAR SURGERY  2019    [facility]JS. Left upper extremity fistulogram including venography to the superior vena cava. Balloon angioplasty left subclavian vein with 10mm x 40mm conquest balloon. Balloon angioplasty mid/proximal upper arm cephalic vein with 65QI x 40mm conquest balloon. Venograms after balloon angioplasty. Stent left mid/proximal upper arm cephalic vein stenosis fluency 10mm x 60mm self-expanding covered stent. Balloon     VASCULAR SURGERY  2019    cont angioplasty stent with 10mm x 40mm conquest balloon. Completion venograms left upper extremity.        Family History   Problem Relation Age of Onset    Colon Cancer Mother          at 63    Colon Polyps Mother     Lung Cancer Father          at 66    Colon Polyps Father     Stomach Cancer Sister     Breast Cancer Sister 27    Depression Daughter 25        committed suicide    Liver Cancer Neg Hx     Liver Disease Neg Hx     Esophageal Cancer Neg Hx     Rectal Cancer Neg Hx        Social History     Socioeconomic History    Marital status: Single     Spouse name: Not on file    Number of children: 2    Years of education: Not on file    Highest education level: Not on file   Occupational History    Not on file   Tobacco Use    Smoking status: Current Every Day Smoker     Packs/day: 0.50     Years: 33.00     Pack years: 16.50     Types: Cigarettes    Smokeless tobacco: Never Used    Tobacco comment: NOW at 1/4 PD, started at age 24 and has averaged 2 PPD   Vaping Use    Vaping Use: Never used   Substance and Sexual Activity    Alcohol use: No    Drug use: Not Currently     Types: Marijuana Veryl Dash)    Sexual activity: Not Currently     Partners: Male   Other Topics Concern    Not on file   Social History Narrative    Not on file     Social Determinants of Health     Financial Resource Strain:     Difficulty of Paying Living Expenses: Not on file   Food Insecurity:     Worried About Running Out of Food in the Last Year: Not on file    Kendy of Food in the Last Year: Not on file   Transportation Needs:     Lack of Transportation (Medical): Not on file    Lack of Transportation (Non-Medical):  Not on file   Physical Activity:     Days of Exercise per Week: Not on file    Minutes of Exercise per Session: Not on file   Stress:     Feeling of Stress : Not on file   Social Connections:     Frequency of Communication with Friends and Family: Not on file    Frequency of Social Gatherings with Friends and Family: Not on file    Attends Baptism Services: Not on file    Active Member of Clubs or Organizations: Not on file    Attends Club or Organization Meetings: Not on file    Marital Status: Not on file   Intimate Partner Violence:     Fear of Current or Ex-Partner: Not on file    Emotionally Abused: Not on file   Jigna Gonzales Physically Abused: Not on file    Sexually Abused: Not on file   Housing Stability:     Unable to Pay for Housing in the Last Year: Not on file    Number of Places Lived in the Last Year: Not on file    Unstable Housing in the Last Year: Not on file       Current Facility-Administered Medications   Medication Dose Route Frequency Provider Last Rate Last Admin    [START ON 5/23/2022] vancomycin (VANCOCIN) 500 mg in dextrose 5 % 100 mL IVPB (mini-bag)  500 mg IntraVENous Once Wesley DO Doug        lisinopril (PRINIVIL;ZESTRIL) tablet 40 mg  40 mg Per NG tube Daily Marie Burdick MD   40 mg at 05/21/22 0839    LORazepam (ATIVAN) injection 0.5 mg  0.5 mg IntraVENous Q6H PRN Valdez Ibrahim DO   0.5 mg at 05/20/22 1358    insulin glargine (LANTUS) injection vial 8 Units  8 Units SubCUTAneous Nightly Dom Lee MD   8 Units at 05/20/22 2136    sodium zirconium cyclosilicate (LOKELMA) oral suspension 10 g  10 g Oral TID PRN Marie Burdick MD        sevelamer (RENVELA) packet 0.8 g  0.8 g Oral TID  Dom Lee MD   0.8 g at 05/21/22 0842    insulin lispro (HUMALOG) injection vial 0-6 Units  0-6 Units SubCUTAneous TID  Dom Lee MD   3 Units at 05/21/22 3052    insulin lispro (HUMALOG) injection vial 0-3 Units  0-3 Units SubCUTAneous Nightly Dom Lee MD   1 Units at 05/20/22 2135    levETIRAcetam (KEPPRA) 100 MG/ML solution 500 mg  500 mg Per G Tube BID Gustavo Martinez MD   500 mg at 05/21/22 1600    atorvastatin (LIPITOR) tablet 20 mg  20 mg Oral Daily Dom Lee MD   20 mg at 05/21/22 0839    rOPINIRole (REQUIP) tablet 4 mg  4 mg Oral Nightly Dom Lee MD   4 mg at 05/20/22 2136    NIFEdipine (PROCARDIA XL) extended release tablet 90 mg  90 mg Oral Daily Dom Lee MD   90 mg at 05/21/22 0839    metoprolol succinate (TOPROL XL) extended release tablet 100 mg  100 mg Oral M DARION Lee MD   100 mg at 05/21/22 0839    levothyroxine (SYNTHROID) tablet 150 mcg  150 mcg Oral Daily Juanis Diamond MD   150 mcg at 05/21/22 7096    isosorbide mononitrate (IMDUR) extended release tablet 120 mg  120 mg Oral Daily Juanis Diamond MD   120 mg at 05/21/22 8436    DULoxetine (CYMBALTA) extended release capsule 60 mg  60 mg Oral Daily Juanis Diamond MD   60 mg at 05/21/22 6537    collagenase ointment   Topical Daily Juanis Diamond MD   Given at 05/21/22 9516    sodium chloride flush 0.9 % injection 5-40 mL  5-40 mL IntraVENous 2 times per day Charlie Gibbs MD   10 mL at 05/21/22 0843    sodium chloride flush 0.9 % injection 5-40 mL  5-40 mL IntraVENous PRN Charlie Gibbs MD        0.9 % sodium chloride infusion   IntraVENous PRN Charlie Gibbs MD        HYDROmorphone HCl PF (DILAUDID) injection 0.25 mg  0.25 mg IntraVENous Q5 Min PRN Charlie Gibbs MD        HYDROmorphone HCl PF (DILAUDID) injection 0.5 mg  0.5 mg IntraVENous Q5 Min PRN Charlie Gibbs MD        vancomycin Baron Winslow) intermittent dosing (placeholder)   Other RX Miracle Buchanan MD        aminoglycoside intermittent dosing (placeholder)   Other RX Placeholder Jesika Keyes MD        acetylcysteine (MUCOMYST) 20 % solution 600 mg  600 mg Inhalation BID Jesika Keyes MD   600 mg at 05/21/22 0626    ipratropium-albuterol (DUONEB) nebulizer solution 1 ampule  1 ampule Inhalation Q4H Jesika Keyes MD   1 ampule at 05/21/22 1006    hydrALAZINE (APRESOLINE) injection 10 mg  10 mg IntraVENous Q6H PRN Jesika Keyes MD   10 mg at 05/20/22 0324    aspirin chewable tablet 81 mg  81 mg Oral Daily Jesika Keyes MD   81 mg at 05/21/22 0838    racepinephrine HCl (VAPONEFPRIN) 2.25 % nebulizer solution NEBU 11.25 mg  11.25 mg Nebulization Q4H PRN Jesika Keyes MD   11.25 mg at 05/03/22 1408    sodium chloride nebulizer 0.9 % solution 3 mL  3 mL Nebulization Q4H PRN Jesika Keyes MD        propofol injection  5-50 mcg/kg/min IntraVENous Continuous Jesika Keyes MD   Stopped at 05/19/22 1030    heparin (porcine) injection 5,000 Units  5,000 Units SubCUTAneous TID Emily Wallace MD   5,000 Units at 05/21/22 8627    Peppermint Spirit SPRT   Does not apply PRN Emily Wallace MD        menthol-zinc oxide (CALMOSEPTINE) 0.44-20.6 % ointment   Topical BID Gunner Swanson MD   Given at 05/21/22 9247    sodium phosphate 10.5 mmol in sodium chloride 0.9 % 250 mL IVPB  0.16 mmol/kg IntraVENous PRN Emily Wallace MD        Or    sodium phosphate 21 mmol in sodium chloride 0.9 % 250 mL IVPB  0.32 mmol/kg IntraVENous PRN Emily Wallace MD        hydrALAZINE (APRESOLINE) tablet 100 mg  100 mg Oral 3 times per day Emily Wallace MD   100 mg at 05/21/22 0550    glucagon (rDNA) injection 1 mg  1 mg IntraMUSCular PRN Emily Wallace MD        dextrose 5 % solution  100 mL/hr IntraVENous PRN Emily Wallace MD        glucose chewable tablet 16 g  4 tablet Oral PRN Emily Wallace MD        dextrose bolus (hypoglycemia) 10% 125 mL  125 mL IntraVENous PRN Emily Wallace MD   Stopped at 05/11/22 2016    Or    dextrose bolus (hypoglycemia) 10% 250 mL  250 mL IntraVENous PRDAVI Wallace MD   Stopped at 05/10/22 2026    LORazepam (ATIVAN) injection 1 mg  1 mg IntraVENous Q5 Min PRDAVI Wallace MD        ondansetron (ZOFRAN-ODT) disintegrating tablet 4 mg  4 mg Oral Q8H PRN Emily Wallace MD        Or    [facility] injection 4 mg  4 mg IntraVENous Q6H PRDAVI Wallace MD        polyethylene glycol [facility]) packet 17 g  17 g Oral Daily PRDAVI Wallace MD        acetaminophen (TYLENOL) tablet 650 mg  650 mg Oral Q6H PRDAVI Wallace MD   650 mg at 04/25/22 0035    Or    acetaminophen (TYLENOL) suppository 650 mg  650 mg Rectal Q6H PRN Emily Wallace MD        chlorhexidine (PERIDEX) 0.12 % solution 15 mL  15 mL Mouth/Throat BID Emily Wallace MD   15 mL at 05/21/22 0889    famotidine (PEPCID) 20 mg in sodium chloride (PF) 10 mL injection  20 mg IntraVENous Daily Robert Deleon MD   20 mg at 05/21/22 2568    magic butt cream   Topical Q4H PRN Robert Deleon MD   Given at 05/03/22 0940    magnesium sulfate 1000 mg in dextrose 5% 100 mL IVPB  1,000 mg IntraVENous PRN Robert Deleon MD        potassium bicarb-citric acid (EFFER-K) effervescent tablet 40 mEq  40 mEq Oral PRN Robert Deleon MD        Or    potassium chloride 10 mEq/100 mL IVPB (Peripheral Line)  10 mEq IntraVENous PRN Robert Deleon MD   Stopped at 04/25/22 0936         sodium chloride      propofol Stopped (05/19/22 1030)    dextrose          Objective:   BP (!) 147/62   Pulse 94   Temp 99.5 °F (37.5 °C) (Temporal)   Resp 23   Ht 5' 6\" (1.676 m)   Wt 136 lb 1.6 oz (61.7 kg)   SpO2 98%   BMI 21.97 kg/m²     General: no acute distress  HEENT: normocephalic, atraumatic  Lungs: faint rhonchi  Cardiovascular: s1 and s2 normal  Abdomen: soft, positive bowel sounds  Extremities: no cyanosis   Neuro: trached, sedation vacation    Recent Labs     05/19/22  0127 05/20/22  0319 05/21/22  0108   WBC 11.1* 9.6 7.4   RBC 2.73* 2.70* 2.52*   HGB 8.1* 7.9* 7.3*   HCT 26.7* 25.5* 24.4*   MCV 97.8 94.4 96.8   MCH 29.7 29.3 29.0   MCHC 30.3* 31.0* 29.9*    210 215     Recent Labs     05/19/22  0127 05/19/22  1401 05/20/22  0319 05/21/22  0108   *  --  132* 137   K 4.4 4.3 4.7 4.1   ANIONGAP 16  --  19 14   CL 90*  --  88* 95*   CO2 26  --  25 28   BUN 39*  --  63* 33*   CREATININE 2.1*  --  2.9* 1.8*   GLUCOSE 199*  --  164* 187*   CALCIUM 8.9  --  9.1 9.0     Recent Labs     05/19/22  0127 05/20/22 0319 05/21/22  0108   MG 2.4 2.5 2.2   PHOS 3.8 5.2* 3.5     Recent Labs     05/19/22  0127 05/20/22 0319 05/21/22  0108   AST 12 11 11   ALT 6 6 6   BILITOT 0.4 0.4 0.4   ALKPHOS 109* 111* 108*     No results for input(s): PH, PO2, PCO2, HCO3, BE, O2SAT in the last 72 hours. No results for input(s): TROPONINI in the last 72 hours.   Recent Labs     05/19/22  0641 05/20/22 0319   INR 1.13 1.13 No results for input(s): LACTA in the last 72 hours. Intake/Output Summary (Last 24 hours) at 5/21/2022 1048  Last data filed at 5/21/2022 0800  Gross per 24 hour   Intake 683 ml   Output 3300 ml   Net -2617 ml       No results found.      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 Brittany Burnett collar trials     ESRD on HD  Renal following     Medical noncompliance       C. difficile infection  Completed oral vancomycin course     Tobacco dependence       IDDM 2  Meds on board     DVT prophylaxis  Heparin     Dispo:  LTAC eval in progress    Total critical care time: 40 minutes    Edilma Smith MD   5/21/2022 10:48 AM

## 2022-05-21 NOTE — PROGRESS NOTES
Nephrology ([address] [facility]) Progress Note    Patient:  Mindy Christianson  YOB: 1970  Date of Service: 5/21/2022  MRN: [id number]   Acct: [de-identified]   Primary Care Physician: ROMINA Seals  Advance Directive: Full Code  Admit Date: 4/24/2022       Hospital Day: 32  Referring Provider: Jadiel Jon MD    Patient independently seen and examined, Chart, Consults, Notes, Operative notes, Labs, Cardiology, and Radiology studies reviewed as available. Chief complaint: Altered mental status/end-stage renal disease    Subjective:  Mindy Christianson is a 47 y.o. female for whom we were consulted for evaluation and treatment of end-stage renal disease. Patient also has history of seizure disorder, type 2 diabetes, hypertension, frequent hospitalization with noncompliance and poorly controlled diabetes. Patient was brought to the emergency room after she was found to have unresponsiveness and witnessed grand mal seizure. She was brought in by ambulance. On arrival in the emergency room she was found to be severely hyperglycemic and had a urinary tract infection. Patient was intubated, sedated and currently being treated by neurology for grand mal seizure. She is also receiving IV antibiotics and insulin drip. Her blood sugar control has significantly improved. On April 25, she has received emergent hemodialysis treatment for correction of volume status as well as hyponatremia. She is now moved from ICU to CCU. Recent MRI of brain showed Ischemic brain infarct. Recently patient underwent tracheotomy as she failed to wean off of the ventilator. She also had PEG placement. She is now waiting to go to long-term acute [facility] hospital.  She has received routine hemodialysis treatment yesterday    This morning patient remained on ventilator via tracheotomy. She is awake today but no interaction is possible. She has persistent gaze towards her left side.   There is spontaneous eye movements as well.       Allergies:  Penicillins, Lamisil advanced [terbinafine], Reglan [metoclopramide], and Stadol [butorphanol]    Medicines:  Current Facility-Administered Medications   Medication Dose Route Frequency Provider Last Rate Last Admin    [START ON 5/23/2022] vancomycin (VANCOCIN) 500 mg in dextrose 5 % 100 mL IVPB (mini-bag)  500 mg IntraVENous Once Cong Baar,         lisinopril (PRINIVIL;ZESTRIL) tablet 40 mg  40 mg Per NG tube Daily Alicia Graves MD   40 mg at 05/21/22 0839    LORazepam (ATIVAN) injection 0.5 mg  0.5 mg IntraVENous Q6H PRN Ora Fam DO   0.5 mg at 05/20/22 1358    insulin glargine (LANTUS) injection vial 8 Units  8 Units SubCUTAneous Nightly Juanis Diamond MD   8 Units at 05/20/22 2136    sodium zirconium cyclosilicate (LOKELMA) oral suspension 10 g  10 g Oral TID PRN Alicia Graves MD        sevelamer (RENVELA) packet 0.8 g  0.8 g Oral TID  Juanis Diamond MD   0.8 g at 05/21/22 0842    insulin lispro (HUMALOG) injection vial 0-6 Units  0-6 Units SubCUTAneous TID  Juanis Diamond MD   3 Units at 05/21/22 8605    insulin lispro (HUMALOG) injection vial 0-3 Units  0-3 Units SubCUTAneous Nightly Juanis Diamond MD   1 Units at 05/20/22 2135    levETIRAcetam (KEPPRA) 100 MG/ML solution 500 mg  500 mg Per G Tube BID Tiana Gagnon MD   500 mg at 05/21/22 5354    atorvastatin (LIPITOR) tablet 20 mg  20 mg Oral Daily Juanis Diamond MD   20 mg at 05/21/22 0839    rOPINIRole (REQUIP) tablet 4 mg  4 mg Oral Nightly Juanis Diamond MD   4 mg at 05/20/22 2136    NIFEdipine (PROCARDIA XL) extended release tablet 90 mg  90 mg Oral Daily Juanis Diamond MD   90 mg at 05/21/22 2224    metoprolol succinate (TOPROL XL) extended release tablet 100 mg  100 mg Oral QAM AC Juanis Diamond MD   100 mg at 05/21/22 3043    levothyroxine (SYNTHROID) tablet 150 mcg  150 mcg Oral Daily Juanis Diamond MD   150 mcg at 05/21/22 8284    isosorbide mononitrate (IMDUR) extended release tablet 120 mg  120 mg Oral Daily Kaylie Damian MD   120 mg at 05/21/22 3393    DULoxetine (CYMBALTA) extended release capsule 60 mg  60 mg Oral Daily Kaylie Damian MD   60 mg at 05/21/22 8308    collagenase ointment   Topical Daily Kaylie Damian MD   Given at 05/21/22 8624    sodium chloride flush 0.9 % injection 5-40 mL  5-40 mL IntraVENous 2 times per day Bev Singh MD   10 mL at 05/21/22 0843    sodium chloride flush 0.9 % injection 5-40 mL  5-40 mL IntraVENous PRN Bev Singh MD        0.9 % sodium chloride infusion   IntraVENous PRN Bev Singh MD        HYDROmorphone HCl PF (DILAUDID) injection 0.25 mg  0.25 mg IntraVENous Q5 Min PRN Bev Singh MD        HYDROmorphone HCl PF (DILAUDID) injection 0.5 mg  0.5 mg IntraVENous Q5 Min PRN Bev Singh MD        vancomycin Quinton Muta) intermittent dosing (placeholder)   Other RX Janet Flores MD        aminoglycoside intermittent dosing (placeholder)   Other RX Placeholder Vitaliy Russell MD        acetylcysteine (MUCOMYST) 20 % solution 600 mg  600 mg Inhalation BID Vitaliy Russell MD   600 mg at 05/21/22 0626    ipratropium-albuterol (DUONEB) nebulizer solution 1 ampule  1 ampule Inhalation Q4H Vitaliy Russell MD   1 ampule at 05/21/22 1099    hydrALAZINE (APRESOLINE) injection 10 mg  10 mg IntraVENous Q6H PRN Vitaliy Russell MD   10 mg at 05/20/22 0324    aspirin chewable tablet 81 mg  81 mg Oral Daily Vitaliy Russell MD   81 mg at 05/21/22 0838    racepinephrine HCl (VAPONEFPRIN) 2.25 % nebulizer solution NEBU 11.25 mg  11.25 mg Nebulization Q4H PRN Vitaliy Russell MD   11.25 mg at 05/03/22 1408    sodium chloride nebulizer 0.9 % solution 3 mL  3 mL Nebulization Q4H PRN Vitaliy Russell MD        propofol injection  5-50 mcg/kg/min IntraVENous Continuous Vitaliy Russell MD   Stopped at 05/19/22 1030    heparin (porcine) injection 5,000 Units  5,000 Units SubCUTAneous TID Vitaliy Russell MD   5,000 Units at 05/21/22 [address]   Does not apply PRN Ky Gupta MD        menthol-zinc oxide (CALMOSEPTINE) 0.44-20.6 % ointment   Topical BID Lary Lennox, MD   Given at 05/21/22 0451    sodium phosphate 10.5 mmol in sodium chloride 0.9 % 250 mL IVPB  0.16 mmol/kg IntraVENous PRN Ky Gupta MD        Or   [place] sodium phosphate 21 mmol in sodium chloride 0.9 % 250 mL IVPB  0.32 mmol/kg IntraVENous PRN Ky Gupta MD        hydrALAZINE (APRESOLINE) tablet 100 mg  100 mg Oral 3 times per day Ky Gupta MD   100 mg at 05/21/22 0550    glucagon (rDNA) injection 1 mg  1 mg IntraMUSCular PRN Ky Gupta MD        dextrose 5 % solution  100 mL/hr IntraVENous PRN Ky Gupta MD        glucose chewable tablet 16 g  4 tablet Oral PRN Ky Gupta MD        dextrose bolus (hypoglycemia) 10% 125 mL  125 mL IntraVENous PRN Ky Gupta MD   Stopped at 05/11/22 2016    Or    dextrose bolus (hypoglycemia) 10% 250 mL  250 mL IntraVENous PRN Ky Gupta MD   Stopped at 05/10/22 2026    LORazepam (ATIVAN) injection 1 mg  1 mg IntraVENous Q5 Min PRN Ky Gupta MD        ondansetron (ZOFRAN-ODT) disintegrating tablet 4 mg  4 mg Oral Q8H PRN Ky Gupta MD        Or    [facility]dansetron [facility]) injection 4 mg  4 mg IntraVENous Q6H PRN Ky Gupta MD        polyethylene glycol [facility]) packet 17 g  17 g Oral Daily PRN Ky Gupta MD        acetaminophen (TYLENOL) tablet 650 mg  650 mg Oral Q6H PRN Ky Gupta MD   650 mg at 04/25/22 0035    Or    acetaminophen (TYLENOL) suppository 650 mg  650 mg Rectal Q6H PRN Ky Gupta MD        chlorhexidine (PERIDEX) 0.12 % solution 15 mL  15 mL Mouth/Throat BID Ky Gupta MD   15 mL at 05/21/22 0843    famotidine (PEPCID) 20 mg in sodium chloride (PF) 10 mL injection  20 mg IntraVENous Daily Ky Gupta MD   20 mg at 05/21/22 7717    magic butt cream   Topical Q4H PRN Ky Gupta MD   Given at OF RIGHT INTERNAL JUGULAR HEMODIALYSIS CATHETER performed by Kelsy Land MD at [facility] [facility]  2018    1.  Moderately increased stainable iron identified by special stain in Kupffer cells and occasional hepatocytes. Negative for evidence of significant portal or lobular inflammation. Negative for evidence of significant fibrosis    ND COLONOSCOPY W/BIOPSY SINGLE/MULTIPLE N/A 10/20/2017    Dr Ted Yu prep-Tubular AP (-) dysplasia x 2--3 yr recall    ND EGD TRANSORAL BIOPSY SINGLE/MULTIPLE N/A 2016    Dr Tavia Salamanca EGD TRANSORAL BIOPSY SINGLE/MULTIPLE N/A 10/20/2017    Dr Ellie Altamirano (-)    TRACHEOSTOMY  5/10/2022    TRACHEOSTOMY PERCUTANEOUS performed by Job Cárdenas MD at [address] [facility] VASCULAR SURGERY Left 2016    fistula by Dr Jed Pelayo at [address]  10/02/2017    [facility]. Left upper fistulograms/venograms, balloon angioplasty cephalic vein arch [id number] conquest.    VASCULAR SURGERY  2018    FISTULOGRAM    VASCULAR SURGERY  10/29/2018    SJS. Left upper fistulograms/venograms    VASCULAR SURGERY  2018    [facility]JS. Arch aortogram,left upper arteriograms.  VASCULAR SURGERY  2019    SJS. Removal of tunneled dialyis catheter right internal jugular vein.  VASCULAR SURGERY  2019    [facility]. Left upper extremity fistulogram including venography to the superior vena cava. Balloon angioplasty left subclavian vein with 10mm x 40mm conquest balloon. Balloon angioplasty mid/proximal upper arm cephalic vein with 52GA x 40mm conquest balloon. Venograms after balloon angioplasty. Stent left mid/proximal upper arm cephalic vein stenosis fluency 10mm x 60mm self-expanding covered stent. Balloon     VASCULAR SURGERY  2019    cont angioplasty stent with 10mm x 40mm conquest balloon. Completion venograms left upper extremity.        Family History  Family History   Problem Relation Age of Onset    Colon Cancer Mother          at 63    Colon Polyps Mother     Lung Cancer Father          at 79    Colon Polyps Father     Stomach Cancer Sister     Breast Cancer Sister 27    Depression Daughter 25        committed suicide    Liver Cancer Neg Hx     Liver Disease Neg Hx     Esophageal Cancer Neg Hx     Rectal Cancer Neg Hx        Social History  Social History     Socioeconomic History    Marital status: Single     Spouse name: Not on file    Number of children: 2    Years of education: Not on file    Highest education level: Not on file   Occupational History    Not on file   Tobacco Use    Smoking status: Current Every Day Smoker     Packs/day: 0.50     Years: 33.00     Pack years: 16.50     Types: Cigarettes    Smokeless tobacco: Never Used    Tobacco comment: NOW at 1/4 PD, started at age 24 and has averaged 2 PPD   Vaping Use    Vaping Use: Never used   Substance and Sexual Activity    Alcohol use: No    Drug use: Not Currently     Types: Marijuana [place])    Sexual activity: Not Currently     Partners: Male   Other Topics Concern    Not on file   Social History Narrative    Not on file     Social Determinants of Health     Financial Resource Strain:     Difficulty of Paying Living Expenses: Not on file   Food Insecurity:     Worried About Running Out of Food in the Last Year: Not on file    Kendy of Food in the Last Year: Not on file   Transportation Needs:     Lack of Transportation (Medical): Not on file    Lack of Transportation (Non-Medical):  Not on file   Physical Activity:     Days of Exercise per Week: Not on file    Minutes of Exercise per Session: Not on file   Stress:     Feeling of Stress : Not on file   Social Connections:     Frequency of Communication with Friends and Family: Not on file    Frequency of Social Gatherings with Friends and Family: Not on file    Attends Confucianism Services: Not on file    Active Member of Clubs or Organizations: Not on file    Attends Club or Organization Meetings: Not on file    Marital Status: Not on file   Intimate Partner Violence:     Fear of Current or Ex-Partner: Not on file    Emotionally Abused: Not on file    Physically Abused: Not on file    Sexually Abused: Not on file   Housing Stability:     Unable to Pay for Housing in the Last Year: Not on file    Number of Nicolette in the Last Year: Not on file    Unstable Housing in the Last Year: Not on file         Review of Systems:  Review of system is unobtainable as she is currently on a ventilator and poor neurological response.       Objective:  Patient Vitals for the past 24 hrs:   BP Temp Temp src Pulse Resp SpO2 Weight   05/21/22 0839 (!) 147/62   96      05/21/22 0800 (!) 155/64   95 29 100 %    05/21/22 0700 (!) 147/59   95 19 100 %    05/21/22 0500 (!) 144/60   92 15 100 %    05/21/22 0400 (!) 144/61 99.1 °F (37.3 °C) Temporal 94 25 100 % 136 lb 1.6 oz (61.7 kg)   05/21/22 0300 (!) 141/56   92 15 100 %    05/21/22 0229    93 18 100 %    05/21/22 0200 (!) 130/55   93 28 100 %    05/21/22 0100 (!) 128/55   97 26 99 %    05/21/22 0000 (!) 146/60 98.4 °F (36.9 °C) Temporal 98 22 100 %    05/20/22 2300 (!) 138/57   97 20 100 %    05/20/22 2208    93 20 99 %    05/20/22 2200 (!) 144/56   94 26 100 %    05/20/22 2100 (!) 149/61   93 22 100 %    05/20/22 2000 (!) 149/60 98.6 °F (37 °C) Temporal 94 16 99 %    05/20/22 1900 (!) 134/56   90 19 100 %    05/20/22 1837    90 25 99 %    05/20/22 1700 (!) 140/57   94 (!) 31 100 %    05/20/22 1600 (!) 122/55 98 °F (36.7 °C) Temporal 94 21 100 %    05/20/22 1500 (!) 100/51   98 (!) 36 100 %    05/20/22 1448     24 100 %    05/20/22 1447    97 23 100 %    05/20/22 1400 (!) 157/63   92 29 100 %    05/20/22 1300 (!) 146/62   94 25 100 %    05/20/22 1218 (!) 168/73   95 23 100 %    05/20/22 1214 (!) 181/71         05/20/22 1200 (!) 181/71 97.9 °F (36.6 °C) Temporal 93 23 100 %  05/20/22 1100 129/60   89 15 100 %    05/20/22 1030     (!) 33 100 %    05/20/22 1029    83 15 100 %    05/20/22 1000 (!) 139/59   84 16 100 %        Intake/Output Summary (Last 24 hours) at 5/21/2022 0946  Last data filed at 5/20/2022 2000  Gross per 24 hour   Intake 554 ml   Output 3300 ml   Net -2746 ml     General: Sleepy/remains on ventilator via tracheostomy. HEENT: Normocephalic atraumatic head  Neck: Supple without JVD or carotid bruits/midline tracheostomy connected to ventilator. Chest: Bilateral clear breath sounds. CVS: Regular rate and rhythm S1 and S2. Abdominal: Soft and nondistended positive bowel sounds. Extremities: No pedal edema. Skin: warm and dry without rash    Labs:  BMP:   Recent Labs     05/19/22  0127 05/19/22  1401 05/20/22  0319 05/21/22  0108   *  --  132* 137   K 4.4 4.3 4.7 4.1   CL 90*  --  88* 95*   CO2 26  --  25 28   PHOS 3.8  --  5.2* 3.5   BUN 39*  --  63* 33*   CREATININE 2.1*  --  2.9* 1.8*   CALCIUM 8.9  --  9.1 9.0     CBC:   Recent Labs     05/19/22  0127 05/20/22  0319 05/21/22  0108   WBC 11.1* 9.6 7.4   HGB 8.1* 7.9* 7.3*   HCT 26.7* 25.5* 24.4*   MCV 97.8 94.4 96.8    210 215     LIVER PROFILE:   Recent Labs     05/19/22  0127 05/20/22  0319 05/21/22  0108   AST 12 11 11   ALT 6 6 6   BILITOT 0.4 0.4 0.4   ALKPHOS 109* 111* 108*     PT/INR:   Recent Labs     05/19/22  0641 05/20/22  0319   PROTIME 14.5 14.5   INR 1.13 1.13     APTT: No results for input(s): APTT in the last 72 hours. BNP:  No results for input(s): BNP in the last 72 hours. Ionized Calcium:No results for input(s): IONCA in the last 72 hours. Magnesium:  Recent Labs     05/19/22  0127 05/20/22  0319 05/21/22  0108   MG 2.4 2.5 2.2     Phosphorus:  Recent Labs     05/19/22  0127 05/20/22 0319 05/21/22  0108   PHOS 3.8 5.2* 3.5     HgbA1C:   No results for input(s): LABA1C in the last 72 hours.   Hepatic:   Recent Labs     05/19/22 0127 05/20/22 0319 05/21/22  8136 ALKPHOS 109* 111* 108*   ALT 6 6 6   AST 12 11 11   PROT 5.9* 5.9* 5.8*   BILITOT 0.4 0.4 0.4   LABALBU 2.8* 2.8* 3.0*     Lactic Acid: No results for input(s): LACTA in the last 72 hours. Troponin: No results for input(s): CKTOTAL, CKMB, TROPONINT in the last 72 hours. ABGs:   Lab Results   Component Value Date    PHART 7.620 05/19/2022    PO2ART 127.0 05/19/2022    ERO1XDM 28.0 05/19/2022     CRP:  No results for input(s): CRP in the last 72 hours. Sed Rate:  No results for input(s): SEDRATE in the last 72 hours. Culture Results:   Blood Culture Recent:   Recent Labs     04/24/22  1447   BC No growth after 5 days of incubation. Urine Culture Recent :   Recent Labs     04/24/22  1354   LABURIN No growth at 36-48 hours       Radiology reports as per the Radiologist  Radiology: CT Head WO Contrast    Result Date: 4/24/2022  CT HEAD WO CONTRAST 4/24/2022 2:01 PM HISTORY: Altered mental status COMPARISON: CT scan dated 11/18/2021 DOSE LENGTH PRODUCT: 766 mGy cm TECHNIQUE: Helical tomographic images of the brain were obtained without the use of intravenous contrast. Automated exposure control was also utilized to decrease patient radiation dose. FINDINGS: There is no evidence of evolving large vascular territory infarct. No visualized intra-axial or extra-axial hemorrhage. No mass lesion is identified. Normal size and configuration of the ventricular system. The basal cisterns are symmetric. Posterior fossa structures are unremarkable. The included orbits and their contents are unremarkable. Chronic paranasal sinus disease on the left. The visualized osseous structures and overlying soft tissues of the skull and face are unremarkable. 1. No acute intracranial process.  Signed by Dr Davis Ennis    Result Date: 4/24/2022  XR CHEST PORTABLE 4/24/2022 4:27 PM HISTORY: ET tube, enteric tube  Technique: Single AP view of the chest COMPARISONS: 4/24/2022 FINDINGS: Endotracheal tube is identified with tip essentially touching the krysta. Enteric tube courses into the stomach with tip curled in the fundus back towards the gastroesophageal junction. The lungs are clear and well expanded. Heart size is stable. Pulmonary vasculature are nondilated. No pleural effusion or pneumothorax. 1. Endotracheal tube is deep, tip essentially touching the krysta. Lungs are clear and well expanded. I would recommend 2-3 cm withdrawal for a more optimal positioning. The results of this exam were discussed with Keerthi Villeda (ICU nursing) on 4/24/2022 at 1640 hours Signed by Dr Meg Rodas    XR CHEST PORTABLE    Result Date: 4/24/2022  XR CHEST PORTABLE 4/24/2022 1:17 PM HISTORY: ET tube placement  Technique: Single AP view of the chest COMPARISONS: 4/24/2022 FINDINGS: Status post endotracheal intubation. ET tube is in good position. Lungs are well-expanded. Enteric tube curls on itself in the midesophagus and then extends back up into the throat. Heart size is stable. Pulmonary vasculature are nondilated. 1. ET tube is in good position. Lungs are clear and well expanded. 2. Enteric tube curls on itself in the mid esophagus before extending back up into the throat. This will need repositioned. The results of this exam were discussed with Dr. [facility] on 4/24/2022 at 1329 hours Signed by Dr Meg Rodas    XR CHEST PORTABLE    Result Date: 4/24/2022  XR CHEST PORTABLE 4/24/2022 12:34 PM HISTORY: Altered mental status  Technique: Single AP view of the chest COMPARISONS: Chest exam dated 4/1/2022 FINDINGS: No lung consolidation. No pleural effusion or pneumothorax. Cardiomediastinal silhouette and pulmonary vasculature are unremarkable. No acute bony abnormality. Left subclavian region vascular stent with additional stent projecting over the left humerus. No acute cardiopulmonary process.  Signed by Dr Meg Rodas    XR CHEST PORTABLE    Result Date: 4/1/2022  XR CHEST PORTABLE 4/1/2022 12:35 PM HISTORY: Shortness of breath  Technique: Single AP view of the chest COMPARISONS: Chest exam dated 1/22/2022 FINDINGS: Cardiomegaly with central pulmonary vascular congestion, interstitial and alveolar edema as well as bilateral moderate layering pleural effusions. No pneumothorax. No acute bony abnormality. 1. Volume overload with interstitial and carie pulmonary edema as well as bilateral moderate pleural effusions. Signed by Dr Leslie Romo   1. End-stage renal disease/on maintenance dialysis. 2.  Type II diabetic nephropathy. 3.  Acute on chronic respiratory failure, trach and vented. 4.  Status post grand mal seizure  5. Severe hyponatremia due to volume overload. 6.  Anemia of chronic kidney disease. 7.  Urinary tract infection. 8.  Pulmonary edema now has resolved. 9.  Hypokalemia  10. Acute ischemic brain infarct. 11.  Poorly controlled systolic and diastolic hypertension. Plan:  1. Routine dialysis Monday Wednesday Friday. 2.  As needed blood pressure medications needed with routine medicine. .  3.  Continue LANDON. 4.  Patient is waiting for long-term placement [facility] at [facility].       Mendel Provost, MD  05/21/22  9:46 AM

## 2022-05-21 NOTE — PROGRESS NOTES
[facility] Neurology Progress Note      Patient:   James Shah  MR#:    [id number]   Room:    7159/281-35   YOB: 1970  Date of Progress Note: 5/21/2022  Time of Note                           12:55 PM  Consulting Physician:  Maycol Marin DO  Attending Physician:  Andre Schrader MD      INTERVAL HISTORY:  No acute events, remains off sedation, remains more alert, tracking, but still not following commands. REVIEW OF SYSTEMS:  Unable to obtain     PHYSICAL EXAM:    Constitutional    BP (!) 112/55   Pulse 94   Temp 99.5 °F (37.5 °C) (Temporal)   Resp 28   Ht 5' 6\" (1.676 m)   Wt 136 lb 1.6 oz (61.7 kg)   SpO2 96%   BMI 21.97 kg/m²   General appearance: Intubated, sedated   EYES -   Conjunctiva normal  Pupillary exam as below, see CN exam in the neurologic exam  ENT-    No scars, masses, or lesions over external nose or ears  Oropharynx - intubated  Cardiovascular -   No clubbing, cyanosis  Pulmonary-   Mechanically ventilated   Musculoskeletal    No significant wasting of muscles noted  Gait as below, see gait exam in the neurologic exam  Muscle strength, tone, stability as below see the motor exam in the neurologic exam.   No bony deformities  Skin    Warm, dry, and intact to inspection and palpation. No rash, erythema, or pallor        NEUROLOGICAL EXAM     Mental status    []? Awake, alert, oriented   []? Affect attention and concentration appear appropriate  []? Recent and remote memory appears unremarkable  []? Speech normal without dysarthria or aphasia, comprehension and repetition intact. COMMENTS:  Awake, not following commands, does appear to localize to pain at times and will track at times. Cranial Nerves []? No VF deficit to confrontation,  optic discs normal, no papilledema on fundoscopic exam.  []? PERRLA, EOMI, no nystagmus, conjugate eye movements, no ptosis  []? Face symmetric  []? Facial sensation intact  []?  Tongue midline no atrophy or fasciculations present  []? Palate midline, hearing to finger rub normal  []? Shoulder shrug and SCM testing normal  COMMENTS:  PERRL, Face appears sym    Motor   []? 5/5 strength x 4 extremities  [x]? Normal bulk and tone  [x]? No tremor present  []? No rigidity or bradykinesia noted  COMMENTS: Withdrawal Right side   Sensory  []? Sensation intact to light touch, pin prick, vibration, and proprioception BLE  []? Sensation intact to light touch, pin prick, vibration, and proprioception BUE  COMMENTS: Limited, left sided neglect noted     Coordination []? FTN normal bilaterally   []? HTS normal bilaterally  []? SANFORD normal.   COMMENTS: Limited    Reflexes  [x]? Symmetric and non-pathological  [x]? Toes downgoing bilaterally  [x]? No clonus present  COMMENTS:   Gait                  []? Normal steady gait    []? Ataxic    []? Spastic     []? Magnetic     []? Shuffling  [x]? Not assessed  COMMENTS:        LABS/IMAGING:    CT Head WO Contrast    Result Date: 4/24/2022  CT HEAD WO CONTRAST 4/24/2022 2:01 PM HISTORY: Altered mental status COMPARISON: CT scan dated 11/18/2021 DOSE LENGTH PRODUCT: 766 mGy cm TECHNIQUE: Helical tomographic images of the brain were obtained without the use of intravenous contrast. Automated exposure control was also utilized to decrease patient radiation dose. FINDINGS: There is no evidence of evolving large vascular territory infarct. No visualized intra-axial or extra-axial hemorrhage. No mass lesion is identified. Normal size and configuration of the ventricular system. The basal cisterns are symmetric. Posterior fossa structures are unremarkable. The included orbits and their contents are unremarkable. Chronic paranasal sinus disease on the left. The visualized osseous structures and overlying soft tissues of the skull and face are unremarkable. 1. No acute intracranial process.  Signed by Dr Juju Fierro    XR CHEST PORTABLE    Result Date: 4/24/2022  XR CHEST PORTABLE 4/24/2022 4:27 PM HISTORY: ET tube, enteric tube  Technique: Single AP view of the chest COMPARISONS: 4/24/2022 FINDINGS: Endotracheal tube is identified with tip essentially touching the krysta. Enteric tube courses into the stomach with tip curled in the fundus back towards the gastroesophageal junction. The lungs are clear and well expanded. Heart size is stable. Pulmonary vasculature are nondilated. No pleural effusion or pneumothorax. 1. Endotracheal tube is deep, tip essentially touching the krysta. Lungs are clear and well expanded. I would recommend 2-3 cm withdrawal for a more optimal positioning. The results of this exam were discussed with Loly Geronimo (ICU nursing) on 4/24/2022 at 1640 hours Signed by Dr Isai Myers    XR CHEST PORTABLE    Result Date: 4/24/2022  XR CHEST PORTABLE 4/24/2022 1:17 PM HISTORY: ET tube placement  Technique: Single AP view of the chest COMPARISONS: 4/24/2022 FINDINGS: Status post endotracheal intubation. ET tube is in good position. Lungs are well-expanded. Enteric tube curls on itself in the midesophagus and then extends back up into the throat. Heart size is stable. Pulmonary vasculature are nondilated. 1. ET tube is in good position. Lungs are clear and well expanded. 2. Enteric tube curls on itself in the mid esophagus before extending back up into the throat. This will need repositioned. The results of this exam were discussed with Dr. Francesco Cunha on 4/24/2022 at 1329 hours Signed by Dr Isai Myers    XR CHEST PORTABLE    Result Date: 4/24/2022  XR CHEST PORTABLE 4/24/2022 12:34 PM HISTORY: Altered mental status  Technique: Single AP view of the chest COMPARISONS: Chest exam dated 4/1/2022 FINDINGS: No lung consolidation. No pleural effusion or pneumothorax. Cardiomediastinal silhouette and pulmonary vasculature are unremarkable. No acute bony abnormality. Left subclavian region vascular stent with additional stent projecting over the left humerus. No acute cardiopulmonary process.  Signed by  Gaynel Rolls      Lab Results   Component Value Date    WBC 7.4 05/21/2022    HGB 7.3 (L) 05/21/2022    HCT 24.4 (L) 05/21/2022    MCV 96.8 05/21/2022     05/21/2022     Lab Results   Component Value Date     05/21/2022    K 4.1 05/21/2022    CL 95 (L) 05/21/2022    CO2 28 05/21/2022    BUN 33 (H) 05/21/2022    CREATININE 1.8 (H) 05/21/2022    GLUCOSE 187 (H) 05/21/2022    CALCIUM 9.0 05/21/2022    PROT 5.8 (L) 05/21/2022    LABALBU 3.0 (L) 05/21/2022    BILITOT 0.4 05/21/2022    ALKPHOS 108 (H) 05/21/2022    AST 11 05/21/2022    ALT 6 05/21/2022    LABGLOM 29 (A) 05/21/2022    GFRAA 36 (L) 05/21/2022     Lab Results   Component Value Date    INR 1.13 05/20/2022    INR 1.13 05/19/2022    INR 1.09 05/18/2022    PROTIME 14.5 05/20/2022    PROTIME 14.5 05/19/2022    PROTIME 14.1 05/18/2022       RECORD REVIEW:   Previous medical records, medications were reviewed at today's visit. Nursing/physician notes, imaging, labs and vitals reviewed. ASSESSMENT:  47 y.o. admitted after an episode of unresponsiveness followed by generalized tonic-clonic event in the emergency department. She had profoundly elevated glucose, hyponatremia, underlying UTI,  end-stage renal disease with a history of non-compliance noted. MRI with large scattered left MCA distribution stroke. ECHO negative from a cardioembolic standpoint. Trach and PEG now in place. Exam remains largely unchanged overnight, doing about the same, now off sedation. No clinical seizure activity noted overnight.      PLAN:  1. Continue ASA, supportive care. 2.  Continue Keppra  3. Continue addressing respiratory failure, electrolyte and infectious issues, ESRD nephrology following  4. Limit sedating medications as able, plans to wean propofol and use low dose ativan sparingly for sedation if needed. 5.  Palliative care following      Please feel free to call with any questions. [phone] (cell phone).       Eliz Stuart DO  Board Certified Neurology

## 2022-05-22 LAB
ALBUMIN SERPL-MCNC: 3 G/DL (ref 3.5–5.2)
ALP BLD-CCNC: 116 U/L (ref 35–104)
ALT SERPL-CCNC: 6 U/L (ref 5–33)
ANION GAP SERPL CALCULATED.3IONS-SCNC: 19 MMOL/L (ref 7–19)
AST SERPL-CCNC: 11 U/L (ref 5–32)
BASOPHILS ABSOLUTE: 0.1 K/UL (ref 0–0.2)
BASOPHILS RELATIVE PERCENT: 0.6 % (ref 0–1)
BILIRUB SERPL-MCNC: 0.3 MG/DL (ref 0.2–1.2)
BUN BLDV-MCNC: 56 MG/DL (ref 6–20)
CALCIUM SERPL-MCNC: 9 MG/DL (ref 8.6–10)
CHLORIDE BLD-SCNC: 91 MMOL/L (ref 98–111)
CO2: 26 MMOL/L (ref 22–29)
CREAT SERPL-MCNC: 2.6 MG/DL (ref 0.5–0.9)
EOSINOPHILS ABSOLUTE: 0.2 K/UL (ref 0–0.6)
EOSINOPHILS RELATIVE PERCENT: 2.1 % (ref 0–5)
GFR AFRICAN AMERICAN: 23
GFR NON-AFRICAN AMERICAN: 19
GLUCOSE BLD-MCNC: 155 MG/DL (ref 70–99)
GLUCOSE BLD-MCNC: 217 MG/DL (ref 74–109)
GLUCOSE BLD-MCNC: 266 MG/DL (ref 70–99)
GLUCOSE BLD-MCNC: 292 MG/DL (ref 70–99)
GLUCOSE BLD-MCNC: 99 MG/DL (ref 70–99)
HCT VFR BLD CALC: 25.5 % (ref 37–47)
HEMOGLOBIN: 7.6 G/DL (ref 12–16)
IMMATURE GRANULOCYTES #: 0 K/UL
LYMPHOCYTES ABSOLUTE: 0.9 K/UL (ref 1.1–4.5)
LYMPHOCYTES RELATIVE PERCENT: 10.6 % (ref 20–40)
MAGNESIUM: 2.5 MG/DL (ref 1.6–2.6)
MCH RBC QN AUTO: 28.8 PG (ref 27–31)
MCHC RBC AUTO-ENTMCNC: 29.8 G/DL (ref 33–37)
MCV RBC AUTO: 96.6 FL (ref 81–99)
MONOCYTES ABSOLUTE: 0.6 K/UL (ref 0–0.9)
MONOCYTES RELATIVE PERCENT: 7.6 % (ref 0–10)
NEUTROPHILS ABSOLUTE: 6.5 K/UL (ref 1.5–7.5)
NEUTROPHILS RELATIVE PERCENT: 78.6 % (ref 50–65)
PDW BLD-RTO: 16.7 % (ref 11.5–14.5)
PERFORMED ON: ABNORMAL
PERFORMED ON: NORMAL
PHOSPHORUS: 5.1 MG/DL (ref 2.5–4.5)
PLATELET # BLD: 246 K/UL (ref 130–400)
PMV BLD AUTO: 10.3 FL (ref 9.4–12.3)
POTASSIUM SERPL-SCNC: 4.9 MMOL/L (ref 3.5–5)
RBC # BLD: 2.64 M/UL (ref 4.2–5.4)
SODIUM BLD-SCNC: 136 MMOL/L (ref 136–145)
TOTAL PROTEIN: 6 G/DL (ref 6.6–8.7)
WBC # BLD: 8.2 K/UL (ref 4.8–10.8)

## 2022-05-22 PROCEDURE — 6360000002 HC RX W HCPCS: Performed by: SURGERY

## 2022-05-22 PROCEDURE — 87186 SC STD MICRODIL/AGAR DIL: CPT

## 2022-05-22 PROCEDURE — 94640 AIRWAY INHALATION TREATMENT: CPT

## 2022-05-22 PROCEDURE — 36415 COLL VENOUS BLD VENIPUNCTURE: CPT

## 2022-05-22 PROCEDURE — 85025 COMPLETE CBC W/AUTO DIFF WBC: CPT

## 2022-05-22 PROCEDURE — 80053 COMPREHEN METABOLIC PANEL: CPT

## 2022-05-22 PROCEDURE — 87070 CULTURE OTHR SPECIMN AEROBIC: CPT

## 2022-05-22 PROCEDURE — 2700000000 HC OXYGEN THERAPY PER DAY

## 2022-05-22 PROCEDURE — 2580000003 HC RX 258: Performed by: SURGERY

## 2022-05-22 PROCEDURE — 82947 ASSAY GLUCOSE BLOOD QUANT: CPT

## 2022-05-22 PROCEDURE — 6360000002 HC RX W HCPCS: Performed by: PSYCHIATRY & NEUROLOGY

## 2022-05-22 PROCEDURE — 6370000000 HC RX 637 (ALT 250 FOR IP): Performed by: SURGERY

## 2022-05-22 PROCEDURE — 6370000000 HC RX 637 (ALT 250 FOR IP): Performed by: INTERNAL MEDICINE

## 2022-05-22 PROCEDURE — 94003 VENT MGMT INPAT SUBQ DAY: CPT

## 2022-05-22 PROCEDURE — 99232 SBSQ HOSP IP/OBS MODERATE 35: CPT | Performed by: PSYCHIATRY & NEUROLOGY

## 2022-05-22 PROCEDURE — 2500000003 HC RX 250 WO HCPCS: Performed by: SURGERY

## 2022-05-22 PROCEDURE — 87086 URINE CULTURE/COLONY COUNT: CPT

## 2022-05-22 PROCEDURE — 84100 ASSAY OF PHOSPHORUS: CPT

## 2022-05-22 PROCEDURE — 2000000000 HC ICU R&B

## 2022-05-22 PROCEDURE — 83735 ASSAY OF MAGNESIUM: CPT

## 2022-05-22 PROCEDURE — 87205 SMEAR GRAM STAIN: CPT

## 2022-05-22 PROCEDURE — 6370000000 HC RX 637 (ALT 250 FOR IP): Performed by: PSYCHIATRY & NEUROLOGY

## 2022-05-22 PROCEDURE — 2580000003 HC RX 258: Performed by: ANESTHESIOLOGY

## 2022-05-22 RX ORDER — LISINOPRIL 20 MG/1
20 TABLET ORAL 2 TIMES DAILY
Status: DISCONTINUED | OUTPATIENT
Start: 2022-05-22 | End: 2022-05-25 | Stop reason: HOSPADM

## 2022-05-22 RX ADMIN — LISINOPRIL 40 MG: 20 TABLET ORAL at 07:42

## 2022-05-22 RX ADMIN — CHLORHEXIDINE GLUCONATE 15 ML: 1.2 RINSE ORAL at 07:44

## 2022-05-22 RX ADMIN — LISINOPRIL 20 MG: 20 TABLET ORAL at 20:19

## 2022-05-22 RX ADMIN — HEPARIN SODIUM 5000 UNITS: 5000 INJECTION INTRAVENOUS; SUBCUTANEOUS at 07:42

## 2022-05-22 RX ADMIN — HYDRALAZINE HYDROCHLORIDE 100 MG: 50 TABLET, FILM COATED ORAL at 13:52

## 2022-05-22 RX ADMIN — ANORECTAL OINTMENT: 15.7; .44; 24; 20.6 OINTMENT TOPICAL at 04:09

## 2022-05-22 RX ADMIN — INSULIN LISPRO 3 UNITS: 100 INJECTION, SOLUTION INTRAVENOUS; SUBCUTANEOUS at 11:56

## 2022-05-22 RX ADMIN — HYDRALAZINE HYDROCHLORIDE 10 MG: 20 INJECTION INTRAMUSCULAR; INTRAVENOUS at 20:19

## 2022-05-22 RX ADMIN — IPRATROPIUM BROMIDE AND ALBUTEROL SULFATE 1 AMPULE: 2.5; .5 SOLUTION RESPIRATORY (INHALATION) at 18:18

## 2022-05-22 RX ADMIN — Medication 10 ML: at 07:43

## 2022-05-22 RX ADMIN — METOPROLOL SUCCINATE 100 MG: 50 TABLET, EXTENDED RELEASE ORAL at 07:42

## 2022-05-22 RX ADMIN — HEPARIN SODIUM 5000 UNITS: 5000 INJECTION INTRAVENOUS; SUBCUTANEOUS at 20:19

## 2022-05-22 RX ADMIN — ACETYLCYSTEINE 600 MG: 200 SOLUTION ORAL; RESPIRATORY (INHALATION) at 06:53

## 2022-05-22 RX ADMIN — BUSPIRONE HYDROCHLORIDE 5 MG: 5 TABLET ORAL at 20:19

## 2022-05-22 RX ADMIN — ATORVASTATIN CALCIUM 20 MG: 20 TABLET, FILM COATED ORAL at 07:43

## 2022-05-22 RX ADMIN — IPRATROPIUM BROMIDE AND ALBUTEROL SULFATE 1 AMPULE: 2.5; .5 SOLUTION RESPIRATORY (INHALATION) at 01:50

## 2022-05-22 RX ADMIN — CHLORHEXIDINE GLUCONATE 15 ML: 1.2 RINSE ORAL at 20:20

## 2022-05-22 RX ADMIN — DULOXETINE 60 MG: 60 CAPSULE, DELAYED RELEASE ORAL at 07:42

## 2022-05-22 RX ADMIN — COLLAGENASE SANTYL: 250 OINTMENT TOPICAL at 07:45

## 2022-05-22 RX ADMIN — ACETYLCYSTEINE 600 MG: 200 SOLUTION ORAL; RESPIRATORY (INHALATION) at 18:18

## 2022-05-22 RX ADMIN — NIFEDIPINE 90 MG: 90 TABLET, FILM COATED, EXTENDED RELEASE ORAL at 07:42

## 2022-05-22 RX ADMIN — IPRATROPIUM BROMIDE AND ALBUTEROL SULFATE 1 AMPULE: 2.5; .5 SOLUTION RESPIRATORY (INHALATION) at 06:53

## 2022-05-22 RX ADMIN — ASPIRIN 81 MG 81 MG: 81 TABLET ORAL at 07:41

## 2022-05-22 RX ADMIN — HYDRALAZINE HYDROCHLORIDE 100 MG: 50 TABLET, FILM COATED ORAL at 20:19

## 2022-05-22 RX ADMIN — INSULIN GLARGINE 8 UNITS: 100 INJECTION, SOLUTION SUBCUTANEOUS at 20:46

## 2022-05-22 RX ADMIN — INSULIN LISPRO 1 UNITS: 100 INJECTION, SOLUTION INTRAVENOUS; SUBCUTANEOUS at 17:09

## 2022-05-22 RX ADMIN — LEVOTHYROXINE SODIUM 150 MCG: 75 TABLET ORAL at 06:11

## 2022-05-22 RX ADMIN — IPRATROPIUM BROMIDE AND ALBUTEROL SULFATE 1 AMPULE: 2.5; .5 SOLUTION RESPIRATORY (INHALATION) at 14:06

## 2022-05-22 RX ADMIN — ISOSORBIDE MONONITRATE 120 MG: 60 TABLET, EXTENDED RELEASE ORAL at 07:42

## 2022-05-22 RX ADMIN — HYDRALAZINE HYDROCHLORIDE 10 MG: 20 INJECTION INTRAMUSCULAR; INTRAVENOUS at 04:08

## 2022-05-22 RX ADMIN — SEVELAMER CARBONATE 0.8 G: 800 POWDER, FOR SUSPENSION ORAL at 07:43

## 2022-05-22 RX ADMIN — INSULIN LISPRO 3 UNITS: 100 INJECTION, SOLUTION INTRAVENOUS; SUBCUTANEOUS at 07:31

## 2022-05-22 RX ADMIN — LEVETIRACETAM 500 MG: 500 SOLUTION ORAL at 07:41

## 2022-05-22 RX ADMIN — ANORECTAL OINTMENT: 15.7; .44; 24; 20.6 OINTMENT TOPICAL at 17:09

## 2022-05-22 RX ADMIN — BUSPIRONE HYDROCHLORIDE 5 MG: 5 TABLET ORAL at 13:52

## 2022-05-22 RX ADMIN — ANORECTAL OINTMENT: 15.7; .44; 24; 20.6 OINTMENT TOPICAL at 07:44

## 2022-05-22 RX ADMIN — IPRATROPIUM BROMIDE AND ALBUTEROL SULFATE 1 AMPULE: 2.5; .5 SOLUTION RESPIRATORY (INHALATION) at 10:24

## 2022-05-22 RX ADMIN — LEVETIRACETAM 500 MG: 500 SOLUTION ORAL at 20:20

## 2022-05-22 RX ADMIN — HEPARIN SODIUM 5000 UNITS: 5000 INJECTION INTRAVENOUS; SUBCUTANEOUS at 13:51

## 2022-05-22 RX ADMIN — ROPINIROLE HYDROCHLORIDE 4 MG: 4 TABLET, FILM COATED ORAL at 20:19

## 2022-05-22 RX ADMIN — SODIUM CHLORIDE, PRESERVATIVE FREE 20 MG: 5 INJECTION INTRAVENOUS at 07:40

## 2022-05-22 RX ADMIN — LORAZEPAM 0.5 MG: 2 INJECTION INTRAMUSCULAR; INTRAVENOUS at 03:28

## 2022-05-22 RX ADMIN — SEVELAMER CARBONATE 0.8 G: 800 POWDER, FOR SUSPENSION ORAL at 11:56

## 2022-05-22 RX ADMIN — BUSPIRONE HYDROCHLORIDE 5 MG: 5 TABLET ORAL at 07:42

## 2022-05-22 RX ADMIN — Medication 10 ML: at 20:21

## 2022-05-22 RX ADMIN — HYDRALAZINE HYDROCHLORIDE 100 MG: 50 TABLET, FILM COATED ORAL at 04:09

## 2022-05-22 RX ADMIN — IPRATROPIUM BROMIDE AND ALBUTEROL SULFATE 1 AMPULE: 2.5; .5 SOLUTION RESPIRATORY (INHALATION) at 22:20

## 2022-05-22 ASSESSMENT — PULMONARY FUNCTION TESTS
PIF_VALUE: 37
PIF_VALUE: 19
PIF_VALUE: 37
PIF_VALUE: 37
PIF_VALUE: 21
PIF_VALUE: 20
PIF_VALUE: 37
PIF_VALUE: 20
PIF_VALUE: 37

## 2022-05-22 NOTE — PROGRESS NOTES
Hospitalist Progress Note  [facility]     Patient: Sg Peak  : 1970  MRN: [id number]  Code Status: Full Code    Hospital Day: 28   Date of Service: 2022    Subjective:   Patient seen and examined. Trached. Trialing off sedation.     Past Medical History:   Diagnosis Date    Acute ischemic stroke ([facility] [address] 2022    Arthritis     Chronic kidney disease, stage 5, kidney failure (HCC)     Closed fracture of right shoulder girdle, with routine healing, subsequent encounter     DM (diabetes mellitus) type II controlled with renal manifestation ([facility] [address] 1993    Gastroparesis     GERD (gastroesophageal reflux disease)     Hemodialysis patient ([facility] [address]     dialysis on tues, thur, and sat at [facility]    Hypertension     Hypothyroid     Nasal congestion     recent    Neuropathy     Noncompliance with medications     Palliative care patient 10/08/2019    Restless leg syndrome        Past Surgical History:   Procedure Laterality Date    BREAST SURGERY Left     infected milk gland removed    BREAST SURGERY Right 2021    WEDGE EXCISION RIGHT BREAST DUCT WITH LACRIMAL DUCT PROBE, PEC BLOCK performed by Megan Schrader MD at [facility], LAPAROSCOPIC          COLONOSCOPY Left 2020    Dr Christina Tristan hepatic flexure-Severe tortuosity with severe spasming, 1 yr recall    DIALYSIS FISTULA CREATION Left 2019    REVISION LEFT UPPER EXTREMITY AV ACCESS WITH LEFT BRACHIAL ARTERY TO LEFT AXILLARY VEIN WITH  INTERPOSITIONAL ARTEGRAFT   performed by Kelsy Land MD at [address]      GASTROSTOMY TUBE PLACEMENT N/A 5/10/2022    PERCUTANEOUS ENDOSCOPIC GASTROSTOMY TUBE PLACEMENT, performed by Job Cárdenas MD at [address] Right 2019    INSERTION OF RIGHT INTERNAL JUGULAR HEMODIALYSIS CATHETER performed by Kelsy Land MD at [address] [facility]  2018    1.  Moderately increased stainable iron identified by special stain in Kupffer cells and occasional hepatocytes. Negative for evidence of significant portal or lobular inflammation. Negative for evidence of significant fibrosis    TN COLONOSCOPY W/BIOPSY SINGLE/MULTIPLE N/A 10/20/2017    Dr John Ellison prep-Tubular AP (-) dysplasia x 2--3 yr recall    TN EGD TRANSORAL BIOPSY SINGLE/MULTIPLE N/A 2016    Dr [facility] EGD TRANSORAL BIOPSY SINGLE/MULTIPLE N/A 10/20/2017    Dr Victoria Stager (-)    TRACHEOSTOMY  5/10/2022    TRACHEOSTOMY PERCUTANEOUS performed by Herve Galarza MD at [facility] VASCULAR SURGERY Left 2016    fistula by Dr Volodymyr Ugalde at [address]  10/02/2017    SJS. Left upper fistulograms/venograms, balloon angioplasty cephalic vein arch 15Y11 conquest.    VASCULAR SURGERY  2018    FISTULOGRAM    VASCULAR SURGERY  10/29/2018    SJS. Left upper fistulograms/venograms    VASCULAR SURGERY  2018    SJS. Arch aortogram,left upper arteriograms.  VASCULAR SURGERY  2019    SJS. Removal of tunneled dialyis catheter right internal jugular vein.  VASCULAR SURGERY  2019    SJS. Left upper extremity fistulogram including venography to the superior vena cava. Balloon angioplasty left subclavian vein with 10mm x 40mm conquest balloon. Balloon angioplasty mid/proximal upper arm cephalic vein with 77XN x 40mm conquest balloon. Venograms after balloon angioplasty. Stent left mid/proximal upper arm cephalic vein stenosis fluency 10mm x 60mm self-expanding covered stent. Balloon     VASCULAR SURGERY  2019    cont angioplasty stent with 10mm x 40mm conquest balloon. Completion venograms left upper extremity.        Family History   Problem Relation Age of Onset    Colon Cancer Mother          at 63    Colon Polyps Mother     Lung Cancer Father          at 66    Colon Polyps Father     Stomach Cancer Sister     Breast Cancer Sister 27    Depression Daughter 25        committed suicide    Liver Cancer Neg Hx     Liver Disease Neg Hx     Esophageal Cancer Neg Hx     Rectal Cancer Neg Hx        Social History     Socioeconomic History    Marital status: Single     Spouse name: Not on file    Number of children: 2    Years of education: Not on file    Highest education level: Not on file   Occupational History    Not on file   Tobacco Use    Smoking status: Current Every Day Smoker     Packs/day: 0.50     Years: 33.00     Pack years: 16.50     Types: Cigarettes    Smokeless tobacco: Never Used    Tobacco comment: NOW at 1/4 PD, started at age 24 and has averaged 2 PPD   Vaping Use    Vaping Use: Never used   Substance and Sexual Activity    Alcohol use: No    Drug use: Not Currently     Types: Marijuana [place] Su)    Sexual activity: Not Currently     Partners: Male   Other Topics Concern    Not on file   Social History Narrative    Not on file     Social Determinants of Health     Financial Resource Strain:     Difficulty of Paying Living Expenses: Not on file   Food Insecurity:     Worried About Running Out of Food in the Last Year: Not on file    Kendy of Food in the Last Year: Not on file   Transportation Needs:     Lack of Transportation (Medical): Not on file    Lack of Transportation (Non-Medical):  Not on file   Physical Activity:     Days of Exercise per Week: Not on file    Minutes of Exercise per Session: Not on file   Stress:     Feeling of Stress : Not on file   Social Connections:     Frequency of Communication with Friends and Family: Not on file    Frequency of Social Gatherings with Friends and Family: Not on file    Attends Buddhism Services: Not on file    Active Member of Clubs or Organizations: Not on file    Attends Club or Organization Meetings: Not on file    Marital Status: Not on file   Intimate Partner Violence:     Fear of Current or Ex-Partner: Not on file    Emotionally Abused: Not on file   [facility] Physically Abused: Not on file    Sexually Abused: Not on file   Housing Stability:     Unable to Pay for Housing in the Last Year: Not on file    Number of Places Lived in the Last Year: Not on file    Unstable Housing in the Last Year: Not on file       Current Facility-Administered Medications   Medication Dose Route Frequency Provider Last Rate Last Admin    lisinopril (PRINIVIL;ZESTRIL) tablet 20 mg  20 mg Per NG tube BID Rosemary Vu MD        busPIRone (BUSPAR) tablet 5 mg  5 mg Oral TID Nikolas Roy MD   5 mg at 05/22/22 0742    [START ON 5/23/2022] vancomycin (VANCOCIN) 500 mg in dextrose 5 % 100 mL IVPB (mini-bag)  500 mg IntraVENous Once Hermila Bye, DO        LORazepam (ATIVAN) injection 0.5 mg  0.5 mg IntraVENous Q6H PRN Ej Moore DO   0.5 mg at 05/22/22 0328    insulin glargine (LANTUS) injection vial 8 Units  8 Units SubCUTAneous Nightly Nikolas Roy MD   8 Units at 05/21/22 1930    sodium zirconium cyclosilicate (LOKELMA) oral suspension 10 g  10 g Oral TID PRN Rosemary Vu MD        sevelamer (RENVELA) packet 0.8 g  0.8 g Oral TID  Nikolas Roy MD   0.8 g at 05/22/22 0743    insulin lispro (HUMALOG) injection vial 0-6 Units  0-6 Units SubCUTAneous TID  Nikolas Roy MD   3 Units at 05/22/22 0731    insulin lispro (HUMALOG) injection vial 0-3 Units  0-3 Units SubCUTAneous Nightly Nikolas Roy MD   1 Units at 05/21/22 1930    levETIRAcetam (KEPPRA) 100 MG/ML solution 500 mg  500 mg Per G Tube BID Edna Holland MD   500 mg at 05/22/22 0741    atorvastatin (LIPITOR) tablet 20 mg  20 mg Oral Daily Nikolas Roy MD   20 mg at 05/22/22 0743    rOPINIRole (REQUIP) tablet 4 mg  4 mg Oral Nightly Nikolas Roy MD   4 mg at 05/21/22 1924    NIFEdipine (PROCARDIA XL) extended release tablet 90 mg  90 mg Oral Daily Nikolas Roy MD   90 mg at 05/22/22 7969    metoprolol succinate (TOPROL XL) extended release tablet 100 mg  100 mg Oral [facility] Nikolas Roy MD 100 mg at 05/22/22 1364    levothyroxine (SYNTHROID) tablet 150 mcg  150 mcg Oral Daily Hadley Reese MD   150 mcg at 05/22/22 6681    isosorbide mononitrate (IMDUR) extended release tablet 120 mg  120 mg Oral Daily Hadley Reese MD   120 mg at 05/22/22 1024    DULoxetine (CYMBALTA) extended release capsule 60 mg  60 mg Oral Daily Hadley Reese MD   60 mg at 05/22/22 9841    collagenase ointment   Topical Daily Hadley Reese MD   Given at 05/22/22 0745    sodium chloride flush 0.9 % injection 5-40 mL  5-40 mL IntraVENous 2 times per day Caryle Grief, MD   10 mL at 05/22/22 0743    sodium chloride flush 0.9 % injection 5-40 mL  5-40 mL IntraVENous PRN Caryle Grief, MD        0.9 % sodium chloride infusion   IntraVENous PRN Caryle Grief, MD        vancomycin Prabhakar Costa) intermittent dosing (placeholder)   Other RX Elijah Rae MD        aminoglycoside intermittent dosing (placeholder)   Other RX Placeholder Laurell Closs, MD        acetylcysteine (MUCOMYST) 20 % solution 600 mg  600 mg Inhalation BID Laurell Closs, MD   600 mg at 05/22/22 9038    ipratropium-albuterol (DUONEB) nebulizer solution 1 ampule  1 ampule Inhalation Q4H Laurell Closs, MD   1 ampule at 05/22/22 1024    hydrALAZINE (APRESOLINE) injection 10 mg  10 mg IntraVENous Q6H PRN Laurell Closs, MD   10 mg at 05/22/22 0408    aspirin chewable tablet 81 mg  81 mg Oral Daily Laurell Closs, MD   81 mg at 05/22/22 0741    racepinephrine HCl (VAPONEFPRIN) 2.25 % nebulizer solution NEBU 11.25 mg  11.25 mg Nebulization Q4H PRN Laurell Closs, MD   11.25 mg at 05/03/22 1408    sodium chloride nebulizer 0.9 % solution 3 mL  3 mL Nebulization Q4H PRN Laurell Closs, MD        heparin (porcine) injection 5,000 Units  5,000 Units SubCUTAneous TID Laurell Closs, MD   5,000 Units at 05/22/22 3098    Peppermint Spirit SPRT   Does not apply PRN Laurell Closs, MD        menthol-zinc oxide (CALMOSEPTINE) 0.44-20.6 % ointment   Topical BID Kalen Cuevas MD   Given at 05/22/22 9432    sodium phosphate 10.5 mmol in sodium chloride 0.9 % 250 mL IVPB  0.16 mmol/kg IntraVENous PRN Van Corrales MD        Or    sodium phosphate 21 mmol in sodium chloride 0.9 % 250 mL IVPB  0.32 mmol/kg IntraVENous PRN Van Corrales MD        hydrALAZINE (APRESOLINE) tablet 100 mg  100 mg Oral 3 times per day Van Corrales MD   100 mg at 05/22/22 0409    glucagon (rDNA) injection 1 mg  1 mg IntraMUSCular PRN Van Corrales MD        dextrose 5 % solution  100 mL/hr IntraVENous PRN Van Corrales MD        glucose chewable tablet 16 g  4 tablet Oral PRN Van Corrales MD        dextrose bolus (hypoglycemia) 10% 125 mL  125 mL IntraVENous PRN Van Corrales MD   Stopped at 05/11/22 2016    Or    dextrose bolus (hypoglycemia) 10% 250 mL  250 mL IntraVENous PRN Van Corrales MD   Stopped at 05/10/22 2026    LORazepam (ATIVAN) injection 1 mg  1 mg IntraVENous Q5 Min PRN Van Corrales MD        ondansetron (ZOFRAN-ODT) disintegrating tablet 4 mg  4 mg Oral Q8H PRN Van Corrales MD        Or    [facility] injection 4 mg  4 mg IntraVENous Q6H PRN Van Corrales MD        polyethylene glycol [facility]) packet 17 g  17 g Oral Daily PRN Van Corrales MD        acetaminophen (TYLENOL) tablet 650 mg  650 mg Oral Q6H PRN Van Corrales MD   650 mg at 05/21/22 1627    Or    acetaminophen (TYLENOL) suppository 650 mg  650 mg Rectal Q6H PRN Van Corrales MD        chlorhexidine (PERIDEX) 0.12 % solution 15 mL  15 mL Mouth/Throat BID Van Corrales MD   15 mL at 05/22/22 0744    famotidine (PEPCID) 20 mg in sodium chloride (PF) 10 mL injection  20 mg IntraVENous Daily Van Corrales MD   20 mg at 05/22/22 0740    magic butt cream   Topical Q4H PRN Van Corrales MD   Given at 05/03/22 0940    magnesium sulfate 1000 mg in dextrose 5% 100 mL IVPB  1,000 mg IntraVENous PRN Van Corrales MD        potassium bicarb-citric acid (EFFER-K) effervescent tablet 40 mEq  40 mEq Oral PRN Steven Medrano MD        Or    potassium chloride 10 mEq/100 mL IVPB (Peripheral Line)  10 mEq IntraVENous PRN Steven Medrano MD   Stopped at 04/25/22 0936         sodium chloride      dextrose          Objective:   BP (!) 123/59   Pulse 88   Temp 98.7 °F (37.1 °C) (Temporal)   Resp (!) 33   Ht 5' 6\" (1.676 m)   Wt 136 lb 6.4 oz (61.9 kg)   SpO2 100%   BMI 22.02 kg/m²     General: no acute distress  HEENT: normocephalic, atraumatic  Lungs: faint rhonchi  Cardiovascular: s1 and s2 normal  Abdomen: soft, positive bowel sounds  Extremities: no cyanosis   Neuro: trached, trialing off sedation    Recent Labs     05/20/22 0319 05/21/22 0108 05/22/22  0230   WBC 9.6 7.4 8.2   RBC 2.70* 2.52* 2.64*   HGB 7.9* 7.3* 7.6*   HCT 25.5* 24.4* 25.5*   MCV 94.4 96.8 96.6   MCH 29.3 29.0 28.8   MCHC 31.0* 29.9* 29.8*    215 246     Recent Labs     05/20/22 0319 05/21/22 0108 05/22/22  0230   * 137 136   K 4.7 4.1 4.9   ANIONGAP 19 14 19   CL 88* 95* 91*   CO2 25 28 26   BUN 63* 33* 56*   CREATININE 2.9* 1.8* 2.6*   GLUCOSE 164* 187* 217*   CALCIUM 9.1 9.0 9.0     Recent Labs     05/20/22 0319 05/21/22  0108 05/22/22  0230   MG 2.5 2.2 2.5   PHOS 5.2* 3.5 5.1*     Recent Labs     05/20/22 0319 05/21/22  0108 05/22/22  0230   AST 11 11 11   ALT 6 6 6   BILITOT 0.4 0.4 0.3   ALKPHOS 111* 108* 116*     No results for input(s): PH, PO2, PCO2, HCO3, BE, O2SAT in the last 72 hours. No results for input(s): TROPONINI in the last 72 hours. Recent Labs     05/20/22  0319   INR 1.13     No results for input(s): LACTA in the last 72 hours. Intake/Output Summary (Last 24 hours) at 5/22/2022 1112  Last data filed at 5/22/2022 1000  Gross per 24 hour   Intake 628 ml   Output 75 ml   Net 553 ml       No results found.      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 Andra bradley trials     ESRD on HD  Renal following     Medical noncompliance       C. difficile infection  Completed oral vancomycin course     Tobacco dependence       IDDM 2  Meds on board     DVT prophylaxis  Heparin     Dispo:  LTAC eval in progress    Total critical care time: 45 minutes    Jesús Powell MD   5/22/2022 11:12 AM

## 2022-05-22 NOTE — PROGRESS NOTES
[facility] Neurology Progress Note      Patient:   Jj Henley  MR#:    [id number]   Room:    3229/946-06   YOB: 1970  Date of Progress Note: 5/22/2022  Time of Note                           11:06 AM  Consulting Physician:  Anisha Luog DO  Attending Physician:  Rafa Leigh MD      INTERVAL HISTORY:  No acute events, remains off sedation, remains more alert, tracking, but still not following commands, unchanged. REVIEW OF SYSTEMS:  Unable to obtain     PHYSICAL EXAM:    Constitutional    BP (!) 123/59   Pulse 88   Temp 98.7 °F (37.1 °C) (Temporal)   Resp (!) 33   Ht 5' 6\" (1.676 m)   Wt 136 lb 6.4 oz (61.9 kg)   SpO2 100%   BMI 22.02 kg/m²   General appearance: Intubated, sedated   EYES -   Conjunctiva normal  Pupillary exam as below, see CN exam in the neurologic exam  ENT-    No scars, masses, or lesions over external nose or ears  Oropharynx - intubated  Cardiovascular -   No clubbing, cyanosis  Pulmonary-   Mechanically ventilated   Musculoskeletal    No significant wasting of muscles noted  Gait as below, see gait exam in the neurologic exam  Muscle strength, tone, stability as below see the motor exam in the neurologic exam.   No bony deformities  Skin    Warm, dry, and intact to inspection and palpation. No rash, erythema, or pallor        NEUROLOGICAL EXAM     Mental status    []? Awake, alert, oriented   []? Affect attention and concentration appear appropriate  []? Recent and remote memory appears unremarkable  []? Speech normal without dysarthria or aphasia, comprehension and repetition intact. COMMENTS:  Awake, not following commands, does appear to localize to pain at times and will track at times. Cranial Nerves []? No VF deficit to confrontation,  optic discs normal, no papilledema on fundoscopic exam.  []? PERRLA, EOMI, no nystagmus, conjugate eye movements, no ptosis  []? Face symmetric  []? Facial sensation intact  []?  Tongue midline no atrophy or fasciculations present  []? Palate midline, hearing to finger rub normal  []? Shoulder shrug and SCM testing normal  COMMENTS:  PERRL, Face appears sym    Motor   []? 5/5 strength x 4 extremities  [x]? Normal bulk and tone  [x]? No tremor present  []? No rigidity or bradykinesia noted  COMMENTS: Withdrawal Right side   Sensory  []? Sensation intact to light touch, pin prick, vibration, and proprioception BLE  []? Sensation intact to light touch, pin prick, vibration, and proprioception BUE  COMMENTS: Limited, left sided neglect noted     Coordination []? FTN normal bilaterally   []? HTS normal bilaterally  []? SANFORD normal.   COMMENTS: Limited    Reflexes  [x]? Symmetric and non-pathological  [x]? Toes downgoing bilaterally  [x]? No clonus present  COMMENTS:   Gait                  []? Normal steady gait    []? Ataxic    []? Spastic     []? Magnetic     []? Shuffling  [x]? Not assessed  COMMENTS:        LABS/IMAGING:    CT Head WO Contrast    Result Date: 4/24/2022  CT HEAD WO CONTRAST 4/24/2022 2:01 PM HISTORY: Altered mental status COMPARISON: CT scan dated 11/18/2021 DOSE LENGTH PRODUCT: 766 mGy cm TECHNIQUE: Helical tomographic images of the brain were obtained without the use of intravenous contrast. Automated exposure control was also utilized to decrease patient radiation dose. FINDINGS: There is no evidence of evolving large vascular territory infarct. No visualized intra-axial or extra-axial hemorrhage. No mass lesion is identified. Normal size and configuration of the ventricular system. The basal cisterns are symmetric. Posterior fossa structures are unremarkable. The included orbits and their contents are unremarkable. Chronic paranasal sinus disease on the left. The visualized osseous structures and overlying soft tissues of the skull and face are unremarkable. 1. No acute intracranial process.  Signed by Dr Makayla Meza    XR CHEST PORTABLE    Result Date: 4/24/2022  XR CHEST PORTABLE 4/24/2022 4:27 PM HISTORY: ET tube, enteric tube  Technique: Single AP view of the chest COMPARISONS: 4/24/2022 FINDINGS: Endotracheal tube is identified with tip essentially touching the krysta. Enteric tube courses into the stomach with tip curled in the fundus back towards the gastroesophageal junction. The lungs are clear and well expanded. Heart size is stable. Pulmonary vasculature are nondilated. No pleural effusion or pneumothorax. 1. Endotracheal tube is deep, tip essentially touching the krysta. Lungs are clear and well expanded. I would recommend 2-3 cm withdrawal for a more optimal positioning. The results of this exam were discussed with Joe (ICU nursing) on 4/24/2022 at 1640 hours Signed by Dr Jodie Barrera    XR CHEST PORTABLE    Result Date: 4/24/2022  XR CHEST PORTABLE 4/24/2022 1:17 PM HISTORY: ET tube placement  Technique: Single AP view of the chest COMPARISONS: 4/24/2022 FINDINGS: Status post endotracheal intubation. ET tube is in good position. Lungs are well-expanded. Enteric tube curls on itself in the midesophagus and then extends back up into the throat. Heart size is stable. Pulmonary vasculature are nondilated. 1. ET tube is in good position. Lungs are clear and well expanded. 2. Enteric tube curls on itself in the mid esophagus before extending back up into the throat. This will need repositioned. The results of this exam were discussed with Dr. Lashonda Anton on 4/24/2022 at 1329 hours Signed by Dr Jodie Barrera    XR CHEST PORTABLE    Result Date: 4/24/2022  XR CHEST PORTABLE 4/24/2022 12:34 PM HISTORY: Altered mental status  Technique: Single AP view of the chest COMPARISONS: Chest exam dated 4/1/2022 FINDINGS: No lung consolidation. No pleural effusion or pneumothorax. Cardiomediastinal silhouette and pulmonary vasculature are unremarkable. No acute bony abnormality. Left subclavian region vascular stent with additional stent projecting over the left humerus. No acute cardiopulmonary process. Signed by Dr Nicko An      Lab Results   Component Value Date    WBC 8.2 05/22/2022    HGB 7.6 (L) 05/22/2022    HCT 25.5 (L) 05/22/2022    MCV 96.6 05/22/2022     05/22/2022     Lab Results   Component Value Date     05/22/2022    K 4.9 05/22/2022    CL 91 (L) 05/22/2022    CO2 26 05/22/2022    BUN 56 (H) 05/22/2022    CREATININE 2.6 (H) 05/22/2022    GLUCOSE 217 (H) 05/22/2022    CALCIUM 9.0 05/22/2022    PROT 6.0 (L) 05/22/2022    LABALBU 3.0 (L) 05/22/2022    BILITOT 0.3 05/22/2022    ALKPHOS 116 (H) 05/22/2022    AST 11 05/22/2022    ALT 6 05/22/2022    LABGLOM 19 (A) 05/22/2022    GFRAA 23 (L) 05/22/2022     Lab Results   Component Value Date    INR 1.13 05/20/2022    INR 1.13 05/19/2022    INR 1.09 05/18/2022    PROTIME 14.5 05/20/2022    PROTIME 14.5 05/19/2022    PROTIME 14.1 05/18/2022       RECORD REVIEW:   Previous medical records, medications were reviewed at today's visit. Nursing/physician notes, imaging, labs and vitals reviewed. ASSESSMENT:  47 y.o. admitted after an episode of unresponsiveness followed by generalized tonic-clonic event in the emergency department. She had profoundly elevated glucose, hyponatremia, underlying UTI,  end-stage renal disease with a history of non-compliance noted. MRI with large scattered left MCA distribution stroke. ECHO negative from a cardioembolic standpoint. Trach and PEG in place. Exam remains largely unchanged overnight, doing about the same, off sedation. No clinical seizure activity noted overnight, unchanged.       PLAN:  1. Continue ASA, supportive care. 2.  Continue Keppra  3. Continue addressing respiratory failure, electrolyte and infectious issues, ESRD nephrology following  4. Limit sedating medications   5. Palliative care following      Please feel free to call with any questions. [phone] (cell phone).       Nakita Hernandez DO  Board Certified Neurology

## 2022-05-22 NOTE — PROGRESS NOTES
Nephrology ([address]) Progress Note    Patient:  Rosalia Feng  YOB: 1970  Date of Service: 5/22/2022  MRN: [id number]   Acct: [de-identified]   Primary Care Physician: ROMINA Contreras  Advance Directive: Full Code  Admit Date: 4/24/2022       Hospital Day: 28  Referring Provider: Romelia Jackson MD    Patient independently seen and examined, Chart, Consults, Notes, Operative notes, Labs, Cardiology, and Radiology studies reviewed as available. Chief complaint: Altered mental status/end-stage renal disease    Subjective:  Rosalia Feng is a 47 y.o. female for whom we were consulted for evaluation and treatment of end-stage renal disease. Patient also has history of seizure disorder, type 2 diabetes, hypertension, frequent hospitalization with noncompliance and poorly controlled diabetes. Patient was brought to the emergency room after she was found to have unresponsiveness and witnessed grand mal seizure. She was brought in by ambulance. On arrival in the emergency room she was found to be severely hyperglycemic and had a urinary tract infection. Patient was intubated, sedated and evaluated by neurology. She was initially treated with insulin drip for severe hyperglycemia. Edouard Fraga Her blood sugar control has significantly improved. On April 25, she has received emergent hemodialysis treatment for correction of volume status as well as hyponatremia. She is now moved from ICU to CCU. Recent MRI of brain showed Ischemic brain infarct. Recently patient underwent tracheotomy as she failed to wean off of the ventilator. She also had PEG placement. She is now waiting to go to long-term acute care hospital.  On May 21, she finally weaned off of the ventilator. This morning she is more awake but not interactive.   She is currently breathing with trach collar      Allergies:  Penicillins, Lamisil advanced [terbinafine], Reglan [metoclopramide], and Stadol [butorphanol]    Medicines:  Current Facility-Administered Medications   Medication Dose Route Frequency Provider Last Rate Last Admin    busPIRone (BUSPAR) tablet 5 mg  5 mg Oral TID Dom Lee MD   5 mg at 05/22/22 0742    [START ON 5/23/2022] vancomycin (VANCOCIN) 500 mg in dextrose 5 % 100 mL IVPB (mini-bag)  500 mg IntraVENous Once Adria Ponce DO        lisinopril (PRINIVIL;ZESTRIL) tablet 40 mg  40 mg Per NG tube Daily Marie Burdick MD   40 mg at 05/22/22 1126    LORazepam (ATIVAN) injection 0.5 mg  0.5 mg IntraVENous Q6H PRN Valdez Ibrahim DO   0.5 mg at 05/22/22 0328    insulin glargine (LANTUS) injection vial 8 Units  8 Units SubCUTAneous Nightly Dom Lee MD   8 Units at 05/21/22 1930    sodium zirconium cyclosilicate (LOKELMA) oral suspension 10 g  10 g Oral TID PRN Marie Burdick MD        sevelamer (RENVELA) packet 0.8 g  0.8 g Oral TID  Dom Lee MD   0.8 g at 05/22/22 0743    insulin lispro (HUMALOG) injection vial 0-6 Units  0-6 Units SubCUTAneous TID GIANNA Lee MD   3 Units at 05/22/22 0731    insulin lispro (HUMALOG) injection vial 0-3 Units  0-3 Units SubCUTAneous Nightly Dom Lee MD   1 Units at 05/21/22 1930    levETIRAcetam (KEPPRA) 100 MG/ML solution 500 mg  500 mg Per G Tube BID Gustavo Martinez MD   500 mg at 05/22/22 0741    atorvastatin (LIPITOR) tablet 20 mg  20 mg Oral Daily Dom Lee MD   20 mg at 05/22/22 0743    rOPINIRole (REQUIP) tablet 4 mg  4 mg Oral Nightly Dom Lee MD   4 mg at 05/21/22 1924    NIFEdipine (PROCARDIA XL) extended release tablet 90 mg  90 mg Oral Daily Dom Lee MD   90 mg at 05/22/22 3515    metoprolol succinate (TOPROL XL) extended release tablet 100 mg  100 mg Oral QAM  Dom Lee MD   100 mg at 05/22/22 0323    levothyroxine (SYNTHROID) tablet 150 mcg  150 mcg Oral Daily Dom Lee MD   150 mcg at 05/22/22 3033    isosorbide mononitrate (IMDUR) extended release tablet 120 mg  120 mg Oral Daily Edilma Smith MD   120 mg at 05/22/22 7813    DULoxetine (CYMBALTA) extended release capsule 60 mg  60 mg Oral Daily Edilma Smith MD   60 mg at 05/22/22 3378    collagenase ointment   Topical Daily Edilma Smith MD   Given at 05/22/22 0745    sodium chloride flush 0.9 % injection 5-40 mL  5-40 mL IntraVENous 2 times per day Shemar Scruggs MD   10 mL at 05/22/22 0743    sodium chloride flush 0.9 % injection 5-40 mL  5-40 mL IntraVENous PRN Shemar Scruggs MD        0.9 % sodium chloride infusion   IntraVENous PRN Shemar Scruggs MD        [facility]) intermittent dosing (placeholder)   Other RX Mylo Schwab, MD        aminoglycoside intermittent dosing (placeholder)   Other RX Placeholder Hayden Camarillo MD        acetylcysteine (MUCOMYST) 20 % solution 600 mg  600 mg Inhalation BID Hayden Camarillo MD   600 mg at 05/22/22 6117    ipratropium-albuterol (DUONEB) nebulizer solution 1 ampule  1 ampule Inhalation Q4H Hayden Camarillo MD   1 ampule at 05/22/22 2251    hydrALAZINE (APRESOLINE) injection 10 mg  10 mg IntraVENous Q6H PRN Hayden Camarillo MD   10 mg at 05/22/22 0408    aspirin chewable tablet 81 mg  81 mg Oral Daily Hayden Camarillo MD   81 mg at 05/22/22 0741    racepinephrine HCl (VAPONEFPRIN) 2.25 % nebulizer solution NEBU 11.25 mg  11.25 mg Nebulization Q4H PRN Hayden Camarillo MD   11.25 mg at 05/03/22 1408    sodium chloride nebulizer 0.9 % solution 3 mL  3 mL Nebulization Q4H PRN Hayden Camarillo MD        propofol injection  5-50 mcg/kg/min IntraVENous Continuous Hayden Camarillo MD   Stopped at 05/19/22 1030    heparin (porcine) injection 5,000 Units  5,000 Units SubCUTAneous TID Hayden Camarillo MD   5,000 Units at 05/22/22 9738    Peppermint Spirit SPRT   Does not apply PRN Hayden Camarillo MD        menthol-zinc oxide (CALMOSEPTINE) 0.44-20.6 % ointment   Topical BID Edilma Smith MD   Given at 05/22/22 0744    sodium phosphate 10.5 mmol in sodium chloride 0.9 % 250 mL IVPB  0.16 mmol/kg IntraVENous PRN Odalis Monzon MD        Or    sodium phosphate 21 mmol in sodium chloride 0.9 % 250 mL IVPB  0.32 mmol/kg IntraVENous PRN Odalis Monzon MD        hydrALAZINE (APRESOLINE) tablet 100 mg  100 mg Oral 3 times per day Odalis Monzon MD   100 mg at 05/22/22 0409    glucagon (rDNA) injection 1 mg  1 mg IntraMUSCular PRN Odalis Monzon MD        dextrose 5 % solution  100 mL/hr IntraVENous PRN Odalis Monzon MD        glucose chewable tablet 16 g  4 tablet Oral PRN Odalis Monzon MD        dextrose bolus (hypoglycemia) 10% 125 mL  125 mL IntraVENous PRN Odalis Monzon MD   Stopped at 05/11/22 2016    Or    dextrose bolus (hypoglycemia) 10% 250 mL  250 mL IntraVENous PRN Odalis Monzon MD   Stopped at 05/10/22 2026    LORazepam (ATIVAN) injection 1 mg  1 mg IntraVENous Q5 Min PRN Odalis Monzon MD        ondansetron (ZOFRAN-ODT) disintegrating tablet 4 mg  4 mg Oral Q8H PRN Odalis Monzon MD        Or    ondansetron [facility]) injection 4 mg  4 mg IntraVENous Q6H PRN Odalis Monzon MD        polyethylene glycol [facility]) packet 17 g  17 g Oral Daily PRN Odalis Monzon MD        acetaminophen (TYLENOL) tablet 650 mg  650 mg Oral Q6H PRN Odalis Monzon MD   650 mg at 05/21/22 1627    Or    acetaminophen (TYLENOL) suppository 650 mg  650 mg Rectal Q6H PRN Odalis Monzon MD        chlorhexidine (PERIDEX) 0.12 % solution 15 mL  15 mL Mouth/Throat BID Odalis Monzon MD   15 mL at 05/22/22 0744    famotidine (PEPCID) 20 mg in sodium chloride (PF) 10 mL injection  20 mg IntraVENous Daily Odalis Monzon MD   20 mg at 05/22/22 0740    magic butt cream   Topical Q4H PRN Odalis Monzon MD   Given at 05/03/22 0940    magnesium sulfate 1000 mg in dextrose 5% 100 mL IVPB  1,000 mg IntraVENous PRN Odalis Monzon MD        potassium bicarb-citric acid (EFFER-K) effervescent tablet 40 mEq  40 mEq Oral PRN Odalis Monzon MD        Or   Valencia Thorpe potassium chloride 10 mEq/100 mL IVPB (Peripheral Line)  10 mEq IntraVENous PRN Peggy Mar MD   Stopped at 04/25/22 5278       Past Medical History:  Past Medical History:   Diagnosis Date    Acute ischemic stroke ([facility] 4/30/2022    Arthritis     Chronic kidney disease, stage 5, kidney failure (HCC)     Closed fracture of right shoulder girdle, with routine healing, subsequent encounter     DM (diabetes mellitus) type II controlled with renal manifestation ([facility] 06/1993    Gastroparesis     GERD (gastroesophageal reflux disease)     Hemodialysis patient ([facility]     dialysis on tues, thur, and sat at [facility]    Hypertension     Hypothyroid     Nasal congestion     recent    Neuropathy     Noncompliance with medications     Palliative care patient 10/08/2019    Restless leg syndrome        Past Surgical History:  Past Surgical History:   Procedure Laterality Date    BREAST SURGERY Left 2008    infected milk gland removed    BREAST SURGERY Right 5/25/2021    WEDGE EXCISION RIGHT BREAST DUCT WITH LACRIMAL DUCT PROBE, PEC BLOCK performed by Minh Denis MD at [address] [place] [address]      2008    COLONOSCOPY Left 9/17/2020    Dr Berto Vaughn hepatic flexure-Severe tortuosity with severe spasming, 1 yr recall    DIALYSIS FISTULA CREATION Left 1/25/2019    REVISION LEFT UPPER EXTREMITY AV ACCESS WITH LEFT BRACHIAL ARTERY TO LEFT AXILLARY VEIN WITH  INTERPOSITIONAL ARTEGRAFT   performed by Leroy Najera MD at [address]  2012    GASTROSTOMY TUBE PLACEMENT N/A 5/10/2022    PERCUTANEOUS ENDOSCOPIC GASTROSTOMY TUBE PLACEMENT, performed by Peggy Mar MD at [address] [address] Right 1/25/2019    INSERTION OF RIGHT INTERNAL JUGULAR HEMODIALYSIS CATHETER performed by Leroy Najera MD at [address] [facility]  08/17/2018    1.  Moderately increased stainable iron identified by special stain in Kupffer cells and occasional hepatocytes. Negative for evidence of significant portal or lobular inflammation. Negative for evidence of significant fibrosis    GA COLONOSCOPY W/BIOPSY SINGLE/MULTIPLE N/A 10/20/2017    Dr Maribel Dickens prep-Tubular AP (-) dysplasia x 2--3 yr recall    GA EGD TRANSORAL BIOPSY SINGLE/MULTIPLE N/A 2016    Dr Conrad Gong EGD TRANSORAL BIOPSY SINGLE/MULTIPLE N/A 10/20/2017    Dr Laura Ayala (-)    TRACHEOSTOMY  5/10/2022    TRACHEOSTOMY PERCUTANEOUS performed by Jose Berry MD at [address] VASCULAR SURGERY Left 2016    fistula by Dr uRelas Clarity at [address]  10/02/2017    [facility]. Left upper fistulograms/venograms, balloon angioplasty cephalic vein arch 18E06 conquest.    VASCULAR SURGERY  2018    FISTULOGRAM    VASCULAR SURGERY  10/29/2018    [facility]. Left upper fistulograms/venograms    VASCULAR SURGERY  2018    [facility]. Arch aortogram,left upper arteriograms.  VASCULAR SURGERY  2019    [facility]. Removal of tunneled dialyis catheter right internal jugular vein.  VASCULAR SURGERY  2019    [facility]. Left upper extremity fistulogram including venography to the superior vena cava. Balloon angioplasty left subclavian vein with 10mm x 40mm conquest balloon. Balloon angioplasty mid/proximal upper arm cephalic vein with 88KU x 40mm conquest balloon. Venograms after balloon angioplasty. Stent left mid/proximal upper arm cephalic vein stenosis fluency 10mm x 60mm self-expanding covered stent. Balloon     VASCULAR SURGERY  2019    cont angioplasty stent with 10mm x 40mm conquest balloon. Completion venograms left upper extremity.        Family History  Family History   Problem Relation Age of Onset    Colon Cancer Mother          at 63    Colon Polyps Mother     Lung Cancer Father          at 79    Colon Polyps Father     Stomach Cancer Sister     Breast Cancer Sister 27    Depression Daughter 25        committed suicide    Liver Cancer Neg Hx     Liver Disease Neg Hx     Esophageal Cancer Neg Hx     Rectal Cancer Neg Hx        Social History  Social History     Socioeconomic History    Marital status: Single     Spouse name: Not on file    Number of children: 2    Years of education: Not on file    Highest education level: Not on file   Occupational History    Not on file   Tobacco Use    Smoking status: Current Every Day Smoker     Packs/day: 0.50     Years: 33.00     Pack years: 16.50     Types: Cigarettes    Smokeless tobacco: Never Used    Tobacco comment: NOW at 1/4 PD, started at age 24 and has averaged 2 PPD   Vaping Use    Vaping Use: Never used   Substance and Sexual Activity    Alcohol use: No    Drug use: Not Currently     Types: Marijuana Zollie Axe)    Sexual activity: Not Currently     Partners: Male   Other Topics Concern    Not on file   Social History Narrative    Not on file     Social Determinants of Health     Financial Resource Strain:     Difficulty of Paying Living Expenses: Not on file   Food Insecurity:     Worried About Running Out of Food in the Last Year: Not on file    Kendy of Food in the Last Year: Not on file   Transportation Needs:     Lack of Transportation (Medical): Not on file    Lack of Transportation (Non-Medical):  Not on file   Physical Activity:     Days of Exercise per Week: Not on file    Minutes of Exercise per Session: Not on file   Stress:     Feeling of Stress : Not on file   Social Connections:     Frequency of Communication with Friends and Family: Not on file    Frequency of Social Gatherings with Friends and Family: Not on file    Attends [employer] Services: Not on file    Active Member of Clubs or Organizations: Not on file    Attends Club or Organization Meetings: Not on file    Marital Status: Not on file   Intimate Partner Violence:     Fear of Current or Ex-Partner: Not on file    Emotionally Abused: Not on file    Physically Abused: Not on file    Sexually Abused: Not on file Housing Stability:     Unable to Pay for Housing in the Last Year: Not on file    Number of Places Lived in the Last Year: Not on file    Unstable Housing in the Last Year: Not on file         Review of Systems:  Review of system is unobtainable due to poor neurologic response.       Objective:  Patient Vitals for the past 24 hrs:   BP Temp Temp src Pulse Resp SpO2 Weight   05/22/22 0800 (!) 162/68 98.7 °F (37.1 °C) Temporal 100 (!) 39 100 %    05/22/22 0700 (!) 176/75   100 (!) 32 100 %    05/22/22 0655    99 28 97 %    05/22/22 0600 (!) 166/67   100 20 100 % 136 lb 6.4 oz (61.9 kg)   05/22/22 0500 (!) 167/70   103 28 99 %    05/22/22 0400 (!) 164/69 98.6 °F (37 °C) Temporal 104 (!) 31 100 %    05/22/22 0300 (!) 166/63   107 (!) 47 100 %    05/22/22 0200 (!) 166/66   104 (!) 42 100 %    05/22/22 0100 (!) 152/63   105 20 100 %    05/22/22 0000 (!) 169/71 98.4 °F (36.9 °C) Temporal 102 (!) 34 100 %    05/21/22 2300 (!) 181/74   96 23 100 %    05/21/22 2200 (!) 160/64   105 21 99 %    05/21/22 2100 (!) 152/59   85 29 100 %    05/21/22 2000 (!) 154/60 98.3 °F (36.8 °C) Temporal 87 (!) 33 96 %    05/21/22 1900 (!) 150/55   88 25 98 %    05/21/22 1800 (!) 145/56 98.9 °F (37.2 °C) Temporal 88 (!) 31 93 %    05/21/22 1700 (!) 134/53   87 30 100 %    05/21/22 1600 139/63 101.1 °F (38.4 °C) Temporal 90 30 99 %    05/21/22 1500 126/60   89 24 98 %    05/21/22 1446 125/62         05/21/22 1410    91 (!) 40 (!) 89 %    05/21/22 1400 (!) 145/67   92 21 100 %    05/21/22 1300 128/62   92 28 98 %    05/21/22 1200 (!) 116/56 100.1 °F (37.8 °C) Temporal 93 25 96 %    05/21/22 1100 (!) 112/55   94 28 96 %    05/21/22 1006    94 23 98 %    05/21/22 1000 (!) 117/57   99 20 100 %        Intake/Output Summary (Last 24 hours) at 5/22/2022 0923  Last data filed at 5/22/2022 0800  Gross per 24 hour   Intake 627 ml   Output 125 ml   Net 502 ml     General: She is awake but not alert and currently on trach collar  HEENT: Normocephalic atraumatic head  Neck: Supple without JVD or carotid bruits/midline tracheostomy with trach collar. Chest: Bilateral clear breath sounds. CVS: Regular rate and rhythm S1 and S2. Abdominal: Soft and nondistended positive bowel sounds/PEG tube. .  Extremities: No pedal edema. Skin: warm and dry without rash    Labs:  BMP:   Recent Labs     05/20/22 0319 05/21/22 0108 05/22/22  0230   * 137 136   K 4.7 4.1 4.9   CL 88* 95* 91*   CO2 25 28 26   PHOS 5.2* 3.5 5.1*   BUN 63* 33* 56*   CREATININE 2.9* 1.8* 2.6*   CALCIUM 9.1 9.0 9.0     CBC:   Recent Labs     05/20/22 0319 05/21/22 0108 05/22/22  0230   WBC 9.6 7.4 8.2   HGB 7.9* 7.3* 7.6*   HCT 25.5* 24.4* 25.5*   MCV 94.4 96.8 96.6    215 246     LIVER PROFILE:   Recent Labs     05/20/22 0319 05/21/22 0108 05/22/22  0230   AST 11 11 11   ALT 6 6 6   BILITOT 0.4 0.4 0.3   ALKPHOS 111* 108* 116*     PT/INR:   Recent Labs     05/20/22 0319   PROTIME 14.5   INR 1.13     APTT: No results for input(s): APTT in the last 72 hours. BNP:  No results for input(s): BNP in the last 72 hours. Ionized Calcium:No results for input(s): IONCA in the last 72 hours. Magnesium:  Recent Labs     05/20/22 0319 05/21/22  0108 05/22/22  0230   MG 2.5 2.2 2.5     Phosphorus:  Recent Labs     05/20/22 0319 05/21/22 0108 05/22/22  0230   PHOS 5.2* 3.5 5.1*     HgbA1C:   No results for input(s): LABA1C in the last 72 hours. Hepatic:   Recent Labs     05/20/22 0319 05/21/22  0108 05/22/22  0230   ALKPHOS 111* 108* 116*   ALT 6 6 6   AST 11 11 11   PROT 5.9* 5.8* 6.0*   BILITOT 0.4 0.4 0.3   LABALBU 2.8* 3.0* 3.0*     Lactic Acid: No results for input(s): LACTA in the last 72 hours. Troponin: No results for input(s): CKTOTAL, CKMB, TROPONINT in the last 72 hours.   ABGs:   Lab Results   Component Value Date    PHART 7.620 05/19/2022    PO2ART 127.0 05/19/2022    ULY3RJR 28.0 05/19/2022     CRP:  No pt febrile this afternoon, is reporting a cough and cxr shows RUL infiltrate suggestive of pneumonia. Other potential etiologies would include surgical site infection (although site appears clean) and UTI   - given recent procedure with hardware would cover broadly with vancomycin and zosyn for now   - f/u blood cultures   - check UA   - consider spinal re-imaging if fevers persist results for input(s): CRP in the last 72 hours. Sed Rate:  No results for input(s): SEDRATE in the last 72 hours. Culture Results:   Blood Culture Recent:   Recent Labs     04/24/22  1447   BC No growth after 5 days of incubation. Urine Culture Recent :   Recent Labs     04/24/22  1354   LABURIN No growth at 36-48 hours       Radiology reports as per the Radiologist  Radiology: CT Head WO Contrast    Result Date: 4/24/2022  CT HEAD WO CONTRAST 4/24/2022 2:01 PM HISTORY: Altered mental status COMPARISON: CT scan dated 11/18/2021 DOSE LENGTH PRODUCT: 766 mGy cm TECHNIQUE: Helical tomographic images of the brain were obtained without the use of intravenous contrast. Automated exposure control was also utilized to decrease patient radiation dose. FINDINGS: There is no evidence of evolving large vascular territory infarct. No visualized intra-axial or extra-axial hemorrhage. No mass lesion is identified. Normal size and configuration of the ventricular system. The basal cisterns are symmetric. Posterior fossa structures are unremarkable. The included orbits and their contents are unremarkable. Chronic paranasal sinus disease on the left. The visualized osseous structures and overlying soft tissues of the skull and face are unremarkable. 1. No acute intracranial process. Signed by Dr Gertrudis Matthews    Result Date: 4/24/2022  XR CHEST PORTABLE 4/24/2022 4:27 PM HISTORY: ET tube, enteric tube  Technique: Single AP view of the chest COMPARISONS: 4/24/2022 FINDINGS: Endotracheal tube is identified with tip essentially touching the krysta. Enteric tube courses into the stomach with tip curled in the fundus back towards the gastroesophageal junction. The lungs are clear and well expanded. Heart size is stable. Pulmonary vasculature are nondilated. No pleural effusion or pneumothorax. 1. Endotracheal tube is deep, tip essentially touching the krysta. Lungs are clear and well expanded.  I would recommend 2-3 cm withdrawal for a more optimal positioning. The results of this exam were discussed with Tobin Eddy (ICU nursing) on 4/24/2022 at 1640 hours Signed by Dr Celena Grullon    XR CHEST PORTABLE    Result Date: 4/24/2022  XR CHEST PORTABLE 4/24/2022 1:17 PM HISTORY: ET tube placement  Technique: Single AP view of the chest COMPARISONS: 4/24/2022 FINDINGS: Status post endotracheal intubation. ET tube is in good position. Lungs are well-expanded. Enteric tube curls on itself in the midesophagus and then extends back up into the throat. Heart size is stable. Pulmonary vasculature are nondilated. 1. ET tube is in good position. Lungs are clear and well expanded. 2. Enteric tube curls on itself in the mid esophagus before extending back up into the throat. This will need repositioned. The results of this exam were discussed with Dr. JUÁREZ [facility] on 4/24/2022 at 1329 hours Signed by Dr Celena Grullon    XR CHEST PORTABLE    Result Date: 4/24/2022  XR CHEST PORTABLE 4/24/2022 12:34 PM HISTORY: Altered mental status  Technique: Single AP view of the chest COMPARISONS: Chest exam dated 4/1/2022 FINDINGS: No lung consolidation. No pleural effusion or pneumothorax. Cardiomediastinal silhouette and pulmonary vasculature are unremarkable. No acute bony abnormality. Left subclavian region vascular stent with additional stent projecting over the left humerus. No acute cardiopulmonary process. Signed by Dr Yasmin Taylor    Result Date: 4/1/2022  XR CHEST PORTABLE 4/1/2022 12:35 PM HISTORY: Shortness of breath  Technique: Single AP view of the chest COMPARISONS: Chest exam dated 1/22/2022 FINDINGS: Cardiomegaly with central pulmonary vascular congestion, interstitial and alveolar edema as well as bilateral moderate layering pleural effusions. No pneumothorax. No acute bony abnormality. 1. Volume overload with interstitial and carie pulmonary edema as well as bilateral moderate pleural effusions.  Signed by Dr Sofia Abel   1. End-stage renal disease/on maintenance dialysis. 2.  Type II diabetic nephropathy. 3.  Acute on chronic respiratory failure, trach/trach collar. 4.  Status post grand mal seizure  5. Severe hyponatremia due to volume overload. 6.  Anemia of chronic kidney disease. 7.  Urinary tract infection. 8.  Pulmonary edema now has resolved. 9.  Hypokalemia  10. Acute ischemic brain infarct. 11.  Poorly controlled systolic and diastolic hypertension. Plan:  1. Routine dialysis Monday Wednesday Friday. 2.  Adjust blood pressure medications. .  3.  Continue LANDON. 4.  Patient is waiting for long-term [facility].       Sharlene Ashraf MD  05/22/22  9:23 AM pt febrile this afternoon, is reporting a cough and cxr shows RUL infiltrate suggestive of pneumonia. Other potential etiologies could include surgical site infection (although site appears clean) and UTI   - given recent procedure with hardware would cover broadly with vancomycin and zosyn for now   - f/u blood cultures   - check UA   - consider re-imaging of spine if fevers persist  - Tylenol PRN for fever pt febrile this afternoon, is reporting a cough (productive) and cxr shows RUL infiltrate suggestive of pneumonia.   Most likely due to aspiration - especially since patient's management post spinal surgery included lying flat for 24 hours (initially indicated for 48 hours) and diet in effect at the time  Other potential etiologies could include surgical site infection (although site appears clean) and UTI (Rodriguez catheter in place for ~ > 48 hours)  - given suspicion for aspiration and recent procedure with hardware would cover broadly with vancomycin and zosyn for now   - f/u blood cultures   - check UA   - consider re-imaging of spine if fevers persist  - recommend supplemental oxygen to maintain optimal oxygenation (suggest > 95%)  - Tylenol PRN for fever  - Consider IVF if persistent fevers (for insensible losses)

## 2022-05-23 LAB
ALBUMIN SERPL-MCNC: 2.8 G/DL (ref 3.5–5.2)
ALP BLD-CCNC: 114 U/L (ref 35–104)
ALT SERPL-CCNC: 6 U/L (ref 5–33)
ANION GAP SERPL CALCULATED.3IONS-SCNC: 18 MMOL/L (ref 7–19)
AST SERPL-CCNC: 10 U/L (ref 5–32)
BASOPHILS ABSOLUTE: 0.1 K/UL (ref 0–0.2)
BASOPHILS RELATIVE PERCENT: 0.7 % (ref 0–1)
BILIRUB SERPL-MCNC: 0.3 MG/DL (ref 0.2–1.2)
BUN BLDV-MCNC: 74 MG/DL (ref 6–20)
CALCIUM SERPL-MCNC: 9.1 MG/DL (ref 8.6–10)
CHLORIDE BLD-SCNC: 89 MMOL/L (ref 98–111)
CO2: 25 MMOL/L (ref 22–29)
CREAT SERPL-MCNC: 3.2 MG/DL (ref 0.5–0.9)
EOSINOPHILS ABSOLUTE: 0.3 K/UL (ref 0–0.6)
EOSINOPHILS RELATIVE PERCENT: 4.1 % (ref 0–5)
GENTAMICIN DOSE AMOUNT: NORMAL
GENTAMICIN RANDOM: 4.6 UG/ML
GFR AFRICAN AMERICAN: 18
GFR NON-AFRICAN AMERICAN: 15
GLUCOSE BLD-MCNC: 144 MG/DL (ref 74–109)
GLUCOSE BLD-MCNC: 150 MG/DL (ref 70–99)
GLUCOSE BLD-MCNC: 202 MG/DL (ref 70–99)
GLUCOSE BLD-MCNC: 239 MG/DL (ref 70–99)
GLUCOSE BLD-MCNC: 290 MG/DL (ref 70–99)
HCT VFR BLD CALC: 26.9 % (ref 37–47)
HEMOGLOBIN: 8.1 G/DL (ref 12–16)
IMMATURE GRANULOCYTES #: 0 K/UL
LYMPHOCYTES ABSOLUTE: 0.8 K/UL (ref 1.1–4.5)
LYMPHOCYTES RELATIVE PERCENT: 8.9 % (ref 20–40)
MAGNESIUM: 2.5 MG/DL (ref 1.6–2.6)
MCH RBC QN AUTO: 28.7 PG (ref 27–31)
MCHC RBC AUTO-ENTMCNC: 30.1 G/DL (ref 33–37)
MCV RBC AUTO: 95.4 FL (ref 81–99)
MONOCYTES ABSOLUTE: 0.8 K/UL (ref 0–0.9)
MONOCYTES RELATIVE PERCENT: 9.2 % (ref 0–10)
NEUTROPHILS ABSOLUTE: 6.4 K/UL (ref 1.5–7.5)
NEUTROPHILS RELATIVE PERCENT: 76.7 % (ref 50–65)
PDW BLD-RTO: 16.3 % (ref 11.5–14.5)
PERFORMED ON: ABNORMAL
PHOSPHORUS: 6.1 MG/DL (ref 2.5–4.5)
PLATELET # BLD: 258 K/UL (ref 130–400)
PMV BLD AUTO: 9.7 FL (ref 9.4–12.3)
POTASSIUM SERPL-SCNC: 5.2 MMOL/L (ref 3.5–5)
RBC # BLD: 2.82 M/UL (ref 4.2–5.4)
SODIUM BLD-SCNC: 132 MMOL/L (ref 136–145)
TOTAL PROTEIN: 5.8 G/DL (ref 6.6–8.7)
WBC # BLD: 8.4 K/UL (ref 4.8–10.8)

## 2022-05-23 PROCEDURE — 6370000000 HC RX 637 (ALT 250 FOR IP): Performed by: SURGERY

## 2022-05-23 PROCEDURE — 8010000000 HC HEMODIALYSIS ACUTE INPT

## 2022-05-23 PROCEDURE — 94640 AIRWAY INHALATION TREATMENT: CPT

## 2022-05-23 PROCEDURE — 2000000000 HC ICU R&B

## 2022-05-23 PROCEDURE — 2700000000 HC OXYGEN THERAPY PER DAY

## 2022-05-23 PROCEDURE — 2500000003 HC RX 250 WO HCPCS: Performed by: SURGERY

## 2022-05-23 PROCEDURE — 84100 ASSAY OF PHOSPHORUS: CPT

## 2022-05-23 PROCEDURE — 6370000000 HC RX 637 (ALT 250 FOR IP): Performed by: INTERNAL MEDICINE

## 2022-05-23 PROCEDURE — 2580000003 HC RX 258: Performed by: ANESTHESIOLOGY

## 2022-05-23 PROCEDURE — 6370000000 HC RX 637 (ALT 250 FOR IP): Performed by: PSYCHIATRY & NEUROLOGY

## 2022-05-23 PROCEDURE — 99231 SBSQ HOSP IP/OBS SF/LOW 25: CPT

## 2022-05-23 PROCEDURE — 99232 SBSQ HOSP IP/OBS MODERATE 35: CPT | Performed by: PSYCHIATRY & NEUROLOGY

## 2022-05-23 PROCEDURE — 83735 ASSAY OF MAGNESIUM: CPT

## 2022-05-23 PROCEDURE — 36415 COLL VENOUS BLD VENIPUNCTURE: CPT

## 2022-05-23 PROCEDURE — 6360000002 HC RX W HCPCS: Performed by: SURGERY

## 2022-05-23 PROCEDURE — 80053 COMPREHEN METABOLIC PANEL: CPT

## 2022-05-23 PROCEDURE — 85025 COMPLETE CBC W/AUTO DIFF WBC: CPT

## 2022-05-23 PROCEDURE — 82947 ASSAY GLUCOSE BLOOD QUANT: CPT

## 2022-05-23 PROCEDURE — 2580000003 HC RX 258: Performed by: INTERNAL MEDICINE

## 2022-05-23 PROCEDURE — 80170 ASSAY OF GENTAMICIN: CPT

## 2022-05-23 PROCEDURE — 6360000002 HC RX W HCPCS: Performed by: INTERNAL MEDICINE

## 2022-05-23 PROCEDURE — 5A1D70Z PERFORMANCE OF URINARY FILTRATION, INTERMITTENT, LESS THAN 6 HOURS PER DAY: ICD-10-PCS | Performed by: INTERNAL MEDICINE

## 2022-05-23 PROCEDURE — 2580000003 HC RX 258: Performed by: SURGERY

## 2022-05-23 RX ADMIN — CHLORHEXIDINE GLUCONATE 15 ML: 1.2 RINSE ORAL at 07:24

## 2022-05-23 RX ADMIN — INSULIN LISPRO 1 UNITS: 100 INJECTION, SOLUTION INTRAVENOUS; SUBCUTANEOUS at 20:44

## 2022-05-23 RX ADMIN — SEVELAMER CARBONATE 0.8 G: 800 POWDER, FOR SUSPENSION ORAL at 07:30

## 2022-05-23 RX ADMIN — NIFEDIPINE 90 MG: 90 TABLET, FILM COATED, EXTENDED RELEASE ORAL at 12:08

## 2022-05-23 RX ADMIN — BUSPIRONE HYDROCHLORIDE 5 MG: 5 TABLET ORAL at 07:29

## 2022-05-23 RX ADMIN — LEVETIRACETAM 500 MG: 500 SOLUTION ORAL at 07:29

## 2022-05-23 RX ADMIN — ACETYLCYSTEINE 600 MG: 200 SOLUTION ORAL; RESPIRATORY (INHALATION) at 18:28

## 2022-05-23 RX ADMIN — INSULIN LISPRO 2 UNITS: 100 INJECTION, SOLUTION INTRAVENOUS; SUBCUTANEOUS at 17:02

## 2022-05-23 RX ADMIN — IPRATROPIUM BROMIDE AND ALBUTEROL SULFATE 1 AMPULE: 2.5; .5 SOLUTION RESPIRATORY (INHALATION) at 21:45

## 2022-05-23 RX ADMIN — VANCOMYCIN HYDROCHLORIDE 500 MG: 500 INJECTION, POWDER, LYOPHILIZED, FOR SOLUTION INTRAVENOUS at 17:07

## 2022-05-23 RX ADMIN — BUSPIRONE HYDROCHLORIDE 5 MG: 5 TABLET ORAL at 14:59

## 2022-05-23 RX ADMIN — ANORECTAL OINTMENT: 15.7; .44; 24; 20.6 OINTMENT TOPICAL at 17:08

## 2022-05-23 RX ADMIN — INSULIN LISPRO 3 UNITS: 100 INJECTION, SOLUTION INTRAVENOUS; SUBCUTANEOUS at 08:37

## 2022-05-23 RX ADMIN — SEVELAMER CARBONATE 0.8 G: 800 POWDER, FOR SUSPENSION ORAL at 12:09

## 2022-05-23 RX ADMIN — COLLAGENASE SANTYL: 250 OINTMENT TOPICAL at 07:24

## 2022-05-23 RX ADMIN — HYDRALAZINE HYDROCHLORIDE 100 MG: 50 TABLET, FILM COATED ORAL at 14:59

## 2022-05-23 RX ADMIN — IPRATROPIUM BROMIDE AND ALBUTEROL SULFATE 1 AMPULE: 2.5; .5 SOLUTION RESPIRATORY (INHALATION) at 18:28

## 2022-05-23 RX ADMIN — CHLORHEXIDINE GLUCONATE 15 ML: 1.2 RINSE ORAL at 20:31

## 2022-05-23 RX ADMIN — LEVOTHYROXINE SODIUM 150 MCG: 75 TABLET ORAL at 05:44

## 2022-05-23 RX ADMIN — IPRATROPIUM BROMIDE AND ALBUTEROL SULFATE 1 AMPULE: 2.5; .5 SOLUTION RESPIRATORY (INHALATION) at 15:12

## 2022-05-23 RX ADMIN — HYDRALAZINE HYDROCHLORIDE 10 MG: 20 INJECTION INTRAMUSCULAR; INTRAVENOUS at 03:04

## 2022-05-23 RX ADMIN — HEPARIN SODIUM 5000 UNITS: 5000 INJECTION INTRAVENOUS; SUBCUTANEOUS at 15:05

## 2022-05-23 RX ADMIN — ASPIRIN 81 MG 81 MG: 81 TABLET ORAL at 07:29

## 2022-05-23 RX ADMIN — SODIUM CHLORIDE, PRESERVATIVE FREE 20 MG: 5 INJECTION INTRAVENOUS at 07:29

## 2022-05-23 RX ADMIN — Medication 10 ML: at 20:30

## 2022-05-23 RX ADMIN — GENTAMICIN SULFATE 185.6 MG: 40 INJECTION, SOLUTION INTRAMUSCULAR; INTRAVENOUS at 18:13

## 2022-05-23 RX ADMIN — HYDRALAZINE HYDROCHLORIDE 100 MG: 50 TABLET, FILM COATED ORAL at 05:44

## 2022-05-23 RX ADMIN — ANORECTAL OINTMENT: 15.7; .44; 24; 20.6 OINTMENT TOPICAL at 05:45

## 2022-05-23 RX ADMIN — HEPARIN SODIUM 5000 UNITS: 5000 INJECTION INTRAVENOUS; SUBCUTANEOUS at 08:36

## 2022-05-23 RX ADMIN — IPRATROPIUM BROMIDE AND ALBUTEROL SULFATE 1 AMPULE: 2.5; .5 SOLUTION RESPIRATORY (INHALATION) at 06:56

## 2022-05-23 RX ADMIN — ATORVASTATIN CALCIUM 20 MG: 20 TABLET, FILM COATED ORAL at 07:29

## 2022-05-23 RX ADMIN — DULOXETINE 60 MG: 60 CAPSULE, DELAYED RELEASE ORAL at 07:29

## 2022-05-23 RX ADMIN — IPRATROPIUM BROMIDE AND ALBUTEROL SULFATE 1 AMPULE: 2.5; .5 SOLUTION RESPIRATORY (INHALATION) at 02:21

## 2022-05-23 RX ADMIN — HEPARIN SODIUM 5000 UNITS: 5000 INJECTION INTRAVENOUS; SUBCUTANEOUS at 20:30

## 2022-05-23 RX ADMIN — ACETYLCYSTEINE 600 MG: 200 SOLUTION ORAL; RESPIRATORY (INHALATION) at 06:55

## 2022-05-23 RX ADMIN — HYDRALAZINE HYDROCHLORIDE 100 MG: 50 TABLET, FILM COATED ORAL at 21:17

## 2022-05-23 RX ADMIN — IPRATROPIUM BROMIDE AND ALBUTEROL SULFATE 1 AMPULE: 2.5; .5 SOLUTION RESPIRATORY (INHALATION) at 10:15

## 2022-05-23 RX ADMIN — LEVETIRACETAM 500 MG: 500 SOLUTION ORAL at 20:35

## 2022-05-23 RX ADMIN — SEVELAMER CARBONATE 0.8 G: 800 POWDER, FOR SUSPENSION ORAL at 17:02

## 2022-05-23 RX ADMIN — INSULIN GLARGINE 8 UNITS: 100 INJECTION, SOLUTION SUBCUTANEOUS at 20:44

## 2022-05-23 RX ADMIN — METOPROLOL SUCCINATE 100 MG: 50 TABLET, EXTENDED RELEASE ORAL at 12:09

## 2022-05-23 RX ADMIN — BUSPIRONE HYDROCHLORIDE 5 MG: 5 TABLET ORAL at 20:30

## 2022-05-23 RX ADMIN — ROPINIROLE HYDROCHLORIDE 4 MG: 4 TABLET, FILM COATED ORAL at 20:30

## 2022-05-23 RX ADMIN — LISINOPRIL 20 MG: 20 TABLET ORAL at 20:30

## 2022-05-23 RX ADMIN — LISINOPRIL 20 MG: 20 TABLET ORAL at 12:08

## 2022-05-23 ASSESSMENT — PULMONARY FUNCTION TESTS
PIF_VALUE: 37

## 2022-05-23 ASSESSMENT — PAIN SCALES - GENERAL
PAINLEVEL_OUTOF10: 0

## 2022-05-23 NOTE — PROGRESS NOTES
Comprehensive Nutrition Assessment    Type and Reason for Visit:  Reassess    Nutrition Recommendations/Plan:   1. Continue current POC     Malnutrition Assessment:  Malnutrition Status:  Severe malnutrition (05/19/22 1121)    Context:  Acute Illness     Findings of the 6 clinical characteristics of malnutrition:  Energy Intake:  No significant decrease in energy intake  Weight Loss:  Greater than 5% over 1 month     Body Fat Loss: Moderate body fat loss Buccal region   Muscle Mass Loss: Moderate muscle mass loss Thigh (quadraceps),Temples (temporalis)  Fluid Accumulation:  Mild Generalized   Strength:  Not Performed    Nutrition Assessment:    Pt continues to tolerate EN. No residuals noted at this time. Pt remains mechanically ventilated. No Propofol at this time. Will continue to monitor tolerance and clinical course. Nutrition Related Findings:    PEG, trach, dialysis Wound Type: Pressure Injury,Unstageable,Surgical Incision       Current Nutrition Intake & Therapies:    Average Meal Intake: NPO  Current Tube Feeding (TF) Orders:  · Feeding Route: PEG  · Formula: Peptide Based High Protein  · Schedule: Continuous  · Feeding Regimen: Vital High Protein @ 48ml/hr  · Water Flushes: no free water flush  · Current TF & Flush Orders Provides: Vital High Protein @ 48ml/hr = 1152 kcals with 100g protein, 127g CHO and 963 ml free water from formula  · Goal TF & Flush Orders Provides: Vital High Protein @ 48ml/hr = 1152 kcals with 100g protein, 127g CHO and 963 ml free water from formula      Anthropometric Measures:  Height: 5' 6\" (167.6 cm)  Ideal Body Weight (IBW): 130 lbs (59 kg)    Admission Body Weight: 165 lb (74.8 kg) (estimated)  Current Body Weight: 138 lb (62.6 kg)   Current BMI (kg/m2): 22.3  Usual Body Weight: 144 lb (65.3 kg) (1/2022)  % Weight Change (Calculated): 0.5                    BMI Categories: Normal Weight (BMI 18.5-24. 9)    Estimated Daily Nutrient Needs:  Energy Requirements Based On: Kcal/kg (20-25)  Weight Used for Energy Requirements: Current  Energy (kcal/day): 1331-3079 kcals/day  Weight Used for Protein Requirements: Current (1.5-2.0)  Protein (g/day):  g/protein/day  Method Used for Fluid Requirements: Other (Comment)  Fluid (ml/day): 1370 mL/day    Nutrition Diagnosis:   · Inadequate oral intake related to acute injury/trauma,impaired respiratory function as evidenced by NPO or clear liquid status due to medical condition,intubation,nutrition support - enteral nutrition,weight loss greater than or equal to 5% in 1 month      Nutrition Interventions:   Food and/or Nutrient Delivery: Continue NPO,Continue Current Tube Feeding  Coordination of Nutrition Care: Continue to monitor while inpatient  Plan of Care discussed with: nursing    Goals:  Previous Goal Met: Progressing toward Goal(s)  Goals: Meet at least 75% of estimated needs,Tolerate nutrition support at goal rate       Nutrition Monitoring and Evaluation:   Food/Nutrient Intake Outcomes: Enteral Nutrition Intake/Tolerance  Physical Signs/Symptoms Outcomes: Biochemical Data,Weight,Skin,Nutrition Focused Physical Findings,Fluid Status or Edema        Jessie Quintero MS, RD, LD  Contact: [phone]

## 2022-05-23 NOTE — PROGRESS NOTES
[id number] [facility] Neurology Progress Note      Patient:   Jessica Adames  MR#:    [id number]   Room:    4955/008-46   YOB: 1970  Date of Progress Note: 5/23/2022  Time of Note                           9:52 AM  Consulting Physician:  Mary Parekh DO  Attending Physician:  Nicole Rainey MD      INTERVAL HISTORY:  No acute events, remains more alert, tracking, but still not following commands, largely unchanged. REVIEW OF SYSTEMS:  Unable to obtain     PHYSICAL EXAM:    Constitutional    BP (!) 171/80   Pulse 100   Temp 98.9 °F (37.2 °C) (Temporal)   Resp (!) 33   Ht 5' 6\" (1.676 m)   Wt 138 lb 1.6 oz (62.6 kg)   SpO2 100%   BMI 22.29 kg/m²   General appearance: Intubated, sedated   EYES -   Conjunctiva normal  Pupillary exam as below, see CN exam in the neurologic exam  ENT-    No scars, masses, or lesions over external nose or ears  Oropharynx - intubated  Cardiovascular -   No clubbing, cyanosis  Pulmonary-   Mechanically ventilated   Musculoskeletal    No significant wasting of muscles noted  Gait as below, see gait exam in the neurologic exam  Muscle strength, tone, stability as below see the motor exam in the neurologic exam.   No bony deformities  Skin    Warm, dry, and intact to inspection and palpation. No rash, erythema, or pallor        NEUROLOGICAL EXAM     Mental status    []? Awake, alert, oriented   []? Affect attention and concentration appear appropriate  []? Recent and remote memory appears unremarkable  []? Speech normal without dysarthria or aphasia, comprehension and repetition intact. COMMENTS:  Awake, not following commands, does appear to localize to pain at times and will track at times. Cranial Nerves []? No VF deficit to confrontation,  optic discs normal, no papilledema on fundoscopic exam.  []? PERRLA, EOMI, no nystagmus, conjugate eye movements, no ptosis  []? Face symmetric  []? Facial sensation intact  []?  Tongue midline no atrophy or fasciculations present  []? Palate midline, hearing to finger rub normal  []? Shoulder shrug and SCM testing normal  COMMENTS:  PERRL, Face appears sym    Motor   []? 5/5 strength x 4 extremities  [x]? Normal bulk and tone  [x]? No tremor present  []? No rigidity or bradykinesia noted  COMMENTS: Withdrawal Right side   Sensory  []? Sensation intact to light touch, pin prick, vibration, and proprioception BLE  []? Sensation intact to light touch, pin prick, vibration, and proprioception BUE  COMMENTS: Limited, left sided neglect noted     Coordination []? FTN normal bilaterally   []? HTS normal bilaterally  []? SANFORD normal.   COMMENTS: Limited    Reflexes  [x]? Symmetric and non-pathological  [x]? Toes downgoing bilaterally  [x]? No clonus present  COMMENTS:   Gait                  []? Normal steady gait    []? Ataxic    []? Spastic     []? Magnetic     []? Shuffling  [x]? Not assessed  COMMENTS:        LABS/IMAGING:    CT Head WO Contrast    Result Date: 4/24/2022  CT HEAD WO CONTRAST 4/24/2022 2:01 PM HISTORY: Altered mental status COMPARISON: CT scan dated 11/18/2021 DOSE LENGTH PRODUCT: 766 mGy cm TECHNIQUE: Helical tomographic images of the brain were obtained without the use of intravenous contrast. Automated exposure control was also utilized to decrease patient radiation dose. FINDINGS: There is no evidence of evolving large vascular territory infarct. No visualized intra-axial or extra-axial hemorrhage. No mass lesion is identified. Normal size and configuration of the ventricular system. The basal cisterns are symmetric. Posterior fossa structures are unremarkable. The included orbits and their contents are unremarkable. Chronic paranasal sinus disease on the left. The visualized osseous structures and overlying soft tissues of the skull and face are unremarkable. 1. No acute intracranial process.  Signed by Dr Celena Grullon    XR CHEST PORTABLE    Result Date: 4/24/2022  XR CHEST PORTABLE 4/24/2022 4:27 PM HISTORY: ET tube, enteric tube  Technique: Single AP view of the chest COMPARISONS: 4/24/2022 FINDINGS: Endotracheal tube is identified with tip essentially touching the krysta. Enteric tube courses into the stomach with tip curled in the fundus back towards the gastroesophageal junction. The lungs are clear and well expanded. Heart size is stable. Pulmonary vasculature are nondilated. No pleural effusion or pneumothorax. 1. Endotracheal tube is deep, tip essentially touching the krysta. Lungs are clear and well expanded. I would recommend 2-3 cm withdrawal for a more optimal positioning. The results of this exam were discussed with Blake Branch (ICU nursing) on 4/24/2022 at 1640 hours Signed by Dr Allyson Napier    XR CHEST PORTABLE    Result Date: 4/24/2022  XR CHEST PORTABLE 4/24/2022 1:17 PM HISTORY: ET tube placement  Technique: Single AP view of the chest COMPARISONS: 4/24/2022 FINDINGS: Status post endotracheal intubation. ET tube is in good position. Lungs are well-expanded. Enteric tube curls on itself in the midesophagus and then extends back up into the throat. Heart size is stable. Pulmonary vasculature are nondilated. 1. ET tube is in good position. Lungs are clear and well expanded. 2. Enteric tube curls on itself in the mid esophagus before extending back up into the throat. This will need repositioned. The results of this exam were discussed with Dr. JUÁREZ [facility] on 4/24/2022 at 1329 hours Signed by Dr Allyson Napier    XR CHEST PORTABLE    Result Date: 4/24/2022  XR CHEST PORTABLE 4/24/2022 12:34 PM HISTORY: Altered mental status  Technique: Single AP view of the chest COMPARISONS: Chest exam dated 4/1/2022 FINDINGS: No lung consolidation. No pleural effusion or pneumothorax. Cardiomediastinal silhouette and pulmonary vasculature are unremarkable. No acute bony abnormality. Left subclavian region vascular stent with additional stent projecting over the left humerus. No acute cardiopulmonary process.  Signed by

## 2022-05-23 NOTE — PROGRESS NOTES
Palliative Care Progress Note  5/23/2022 8:14 AM    Patient:  Nu Fiore  YOB: 1970  Primary Care Physician: ROMINA Logan  Advance Directive: Full Code  Admit Date: 4/24/2022       Hospital Day: 34  Portions of this note have been copied forward, however, changed to reflect the most current clinical status of this patient. CHIEF COMPLAINT/REASON FOR CONSULTATION Goals of care    SUBJECTIVE:  Ms. Anshu Galeano is now on trach collar, remains in ICU, and is receiving dialysis treatment. Review of Systems:   14 point review of systems is negative except as specifically addressed above. Objective:   VITALS:  BP (!) 171/80   Pulse 100   Temp 98.9 °F (37.2 °C) (Temporal)   Resp (!) 33   Ht 5' 6\" (1.676 m)   Wt 138 lb 1.6 oz (62.6 kg)   SpO2 100%   BMI 22.29 kg/m²   24HR INTAKE/OUTPUT:      Intake/Output Summary (Last 24 hours) at 5/23/2022 0652  Last data filed at 5/23/2022 0800  Gross per 24 hour   Intake 910 ml   Output 350 ml   Net 560 ml     General appearance: 46 yo female, chronically ill appearing, trached, NAD  Head: Normocephalic, without obvious abnormality, atraumatic  Eyes: conjunctivae/corneas clear. Pupils sluggish  Ears: normal external ears and nose  Neck: supple, symmetrical, trachea midline, trach present  Lungs: Clear and diminished in bases bilaterally to ausculation, trach with trach collar in place, 6L   Heart: regular rate and rhythm, S1, S2 normal, no murmur  Abdomen: soft, no grimacing w/ palpation; non-distended, bowel sounds present.  FMS in place with small diarrhea noted   Extremities: trace edema in BUE, No lower extremity edema,  No erythema, no grimacing w/ palpation   Skin: warm, dry, pale  Neurologic: Trached and off sedation, more alert, tracking at times, but still not following commands, localizes to pain intermittently, spontaneously moving LUE, continues to neglect right side    Medications:      sodium chloride      dextrose        gentamicin 3 mg/kg IntraVENous Once    lisinopril  20 mg Per NG tube BID    busPIRone  5 mg Oral TID    vancomycin  500 mg IntraVENous Once    insulin glargine  8 Units SubCUTAneous Nightly    sevelamer  0.8 g Oral TID WC    insulin lispro  0-6 Units SubCUTAneous TID WC    insulin lispro  0-3 Units SubCUTAneous Nightly    levETIRAcetam  500 mg Per G Tube BID    atorvastatin  20 mg Oral Daily    rOPINIRole  4 mg Oral Nightly    NIFEdipine  90 mg Oral Daily    metoprolol succinate  100 mg Oral QAM AC    levothyroxine  150 mcg Oral Daily    isosorbide mononitrate  120 mg Oral Daily    DULoxetine  60 mg Oral Daily    collagenase   Topical Daily    sodium chloride flush  5-40 mL IntraVENous 2 times per day    vancomycin (VANCOCIN) intermittent dosing (placeholder)   Other RX Placeholder    aminoglycoside intermittent dosing (placeholder)   Other RX Placeholder    acetylcysteine  600 mg Inhalation BID    ipratropium-albuterol  1 ampule Inhalation Q4H    aspirin  81 mg Oral Daily    heparin (porcine)  5,000 Units SubCUTAneous TID    menthol-zinc oxide   Topical BID    hydrALAZINE  100 mg Oral 3 times per day    chlorhexidine  15 mL Mouth/Throat BID    famotidine (PEPCID) injection  20 mg IntraVENous Daily     LORazepam, sodium zirconium cyclosilicate, sodium chloride flush, sodium chloride, hydrALAZINE, racepinephrine HCl, sodium chloride nebulizer, Peppermint Spirit, sodium phosphate IVPB **OR** sodium phosphate IVPB, glucagon (rDNA), dextrose, glucose, dextrose bolus **OR** dextrose bolus, LORazepam, ondansetron **OR** ondansetron, polyethylene glycol, acetaminophen **OR** acetaminophen, magic butt cream, magnesium sulfate, potassium alternative oral replacement **OR** potassium chloride  ADULT TUBE FEEDING; PEG; Peptide Based High Protein; Continuous; 20; Yes; 10; Q 6 hours; 48; 0; Other (specify); no free water flush     Lab and other Data:     Recent Labs     05/21/22  0108 05/22/22  0233 05/23/22  0134   WBC 7.4 8.2 8.4   HGB 7.3* 7.6* 8.1*    246 258     Recent Labs     05/21/22 0108 05/22/22  0230 05/23/22  0134    136 132*   K 4.1 4.9 5.2*   CL 95* 91* 89*   CO2 28 26 25   BUN 33* 56* 74*   CREATININE 1.8* 2.6* 3.2*   GLUCOSE 187* 217* 144*     Recent Labs     05/21/22 0108 05/22/22 0230 05/23/22  0134   AST 11 11 10   ALT 6 6 6   BILITOT 0.4 0.3 0.3   ALKPHOS 108* 116* 114*       Assessment/Plan   Principal Problem:    Acute ischemic stroke (HCC)  Active Problems:    Weakness    Severe malnutrition (HCC)    Gastroesophageal reflux disease without esophagitis    Acquired hypothyroidism    Anemia in chronic kidney disease (CKD)    Type 2 diabetes mellitus with chronic kidney disease on chronic dialysis, with long-term current use of insulin (HCC)    Acute encephalopathy    Respiratory failure ([address]    Uncontrolled type 2 diabetes mellitus with complication, with long-term current use of insulin ([facility])    End stage renal disease (HCC)    PVD (peripheral vascular disease) ([facility])    ESRD on hemodialysis ([address]    Hyponatremia    Hyperglycemia due to type 2 diabetes mellitus ([address]    Uncontrolled hypertension    Seizure ([address]    Palliative care patient    Uncontrolled type 2 diabetes mellitus with hyperosmolar nonketotic hyperglycemia (HCC)    Altered mental state    Acute hypoxemic respiratory failure (HCC)    Generalized weakness    UTI (urinary tract infection)    Closed fracture of left distal femur (HCC)    History of infection with vancomycin resistant Enterococcus (VRE)  Resolved Problems:    * No resolved hospital problems. *      Visit Summary:  Chart reviewed and discussed patient with RN. Pt has been weaned to trach collar at 6L. She spiked a temp over the weekend and repeat cultures are pending. Tolerating dialysis. Ms. Rachel Booker is seen at bedside this morning. Exam remains largely the same, she is more alert but does not follow commands.  SW assisting with [facility] referral and d/c planning. I attempted to call her significant other/legal decision maker, Paloma Roldan, to update on pts status and plan of care but he did not answer. VM left with my callback number. Will continue to follow. Recommendations:   1. Palliative care: GOC continue all aggressive measures. Sig other agreeable to [facility] transfer once approved. Code status: FULL CODE    2. Acute hypoxemic respiratory failure- Extubated 5/3/22 but required reintubation that afternoon. Original intubation date 4/24/22. Now with trach/PEG 5/10/22. Weaned to trach collar 6 L    3. Acute left MCA infarct- mgmt per neuro. MRI 4/29/22 noted. Echo negative 4/30/22. On ASA. Supportive care    4. Hyperglycemia w/ Hx of DM II-mgmt per Hospitalist, SSI    5. S/p tonic-clonic seizure-mgmt per Neurology, cEEG discontinued, Keppra 500 mg BID    6. ESRD on HD-Nephrology managing. Dialysis today    7. Hypertension-Hydralazine 100 mg TID, cardene gtt as needed    8. Clostridium difficile/Norovirus- Oral vancomycin completed 5/8/22    9. MRSA/Raoultella ornithinolytica/Proteus vulgaris respiratory positivity- empiric abx per hospitalist. 100.1  Temp 5/21/22. Repeat cultures NGTD    Thank you for consulting Palliative Care and allowing us to participate in the care of this patient.    Time Spent Counseling > 50%:  YES                                   Total Time Spent with patient/family counseling, workup/treatment review, counseling and placement of orders/preparation of this note: 22 minutes    Electronically signed by ROMINA Pickett CNP on 5/23/2022 at 8:14 AM    (Please note that portions of this note were completed with a voice recognition program.  [place] [employer] made to edit the dictations but occasionally words are mis-transcribed.)

## 2022-05-23 NOTE — PROGRESS NOTES
Nephrology ([facility]) Progress Note    Patient:  Vel Hernandez  YOB: 1970  Date of Service: 5/23/2022  MRN: [id number]   Acct: [de-identified]   Primary Care Physician: ROMINA Schultz  Advance Directive: Full Code  Admit Date: 4/24/2022       Hospital Day: 34  Referring Provider: Laurie Villeda MD    Patient independently seen and examined, Chart, Consults, Notes, Operative notes, Labs, Cardiology, and Radiology studies reviewed as available. Subjective:  Vel Hernandez is a 47 y.o. female for whom we were consulted for evaluation and treatment of end-stage renal disease. Patient also has a history of seizure disorder, type 2 diabetes, hypertension, frequent hospitalization with noncompliance and poorly controlled diabetes. Patient was brought to the emergency room after she was found to have unresponsiveness and witnessed grand mal seizure. She was brought in by ambulance. On arrival in the emergency room, she was found to be severely hyperglycemic and had a urinary tract infection. Patient was intubated, sedated and treated by neurology for grand mal seizure. She was also receiving IV antibiotics and insulin drip. Her blood sugar control had significantly improved. On April 25, she has received emergent hemodialysis treatment for correction of volume status as well as hyponatremia. Today, patient remains unable to participate in the history and physical examination. Events reviewed with nursing at the bedside. No family present. No issues with dialysis today. Using trach collar.     Dialysis   Pt was seen on renal replacement therapy and I have personally seen and evaluated the patient and directed the therapy  Modality: Hemodialysis  Access: Arterial Venous Fistula  Location: left upper  QB: 600  QD: 800  UF: 1140      Allergies:  Penicillins, Lamisil advanced [terbinafine], Reglan [metoclopramide], and Stadol [butorphanol]    Medicines:  Current Facility-Administered Medications   Medication Dose Route Frequency Provider Last Rate Last Admin    gentamicin (GARAMYCIN) 185.6 mg in dextrose 5 % 100 mL IVPB  3 mg/kg IntraVENous Once Juliennee Leigh, DO        lisinopril (PRINIVIL;ZESTRIL) tablet 20 mg  20 mg Per NG tube BID Gabriela Fournier MD   20 mg at 05/22/22 2019    busPIRone (BUSPAR) tablet 5 mg  5 mg Oral TID Jeanna Pabon MD   5 mg at 05/23/22 0729    vancomycin (VANCOCIN) 500 mg in dextrose 5 % 100 mL IVPB (mini-bag)  500 mg IntraVENous Once Juliennee Leigh, DO        LORazepam (ATIVAN) injection 0.5 mg  0.5 mg IntraVENous Q6H PRN Norma Brunson DO   0.5 mg at 05/22/22 0328    insulin glargine (LANTUS) injection vial 8 Units  8 Units SubCUTAneous Nightly Jeanna Pabon MD   8 Units at 05/22/22 2046    sodium zirconium cyclosilicate (LOKELMA) oral suspension 10 g  10 g Oral TID PRN Gabriela Fournier MD        sevelamer (RENVELA) packet 0.8 g  0.8 g Oral TID  Jeanna Pabon MD   0.8 g at 05/23/22 0730    insulin lispro (HUMALOG) injection vial 0-6 Units  0-6 Units SubCUTAneous TID GIANNA Pabon MD   3 Units at 05/23/22 5196    insulin lispro (HUMALOG) injection vial 0-3 Units  0-3 Units SubCUTAneous Nightly Jeanna Pabon MD   1 Units at 05/21/22 1930    levETIRAcetam (KEPPRA) 100 MG/ML solution 500 mg  500 mg Per G Tube BID Rober Crigler, MD   500 mg at 05/23/22 4011    atorvastatin (LIPITOR) tablet 20 mg  20 mg Oral Daily Jeanna Pabon MD   20 mg at 05/23/22 0729    rOPINIRole (REQUIP) tablet 4 mg  4 mg Oral Nightly Jeanna Pabon MD   4 mg at 05/22/22 2019    NIFEdipine (PROCARDIA XL) extended release tablet 90 mg  90 mg Oral Daily Jeanna Pabon MD   90 mg at 05/22/22 0273    metoprolol succinate (TOPROL XL) extended release tablet 100 mg  100 mg Oral QAM AC Jeanna Pabon MD   100 mg at 05/22/22 7315    levothyroxine (SYNTHROID) tablet 150 mcg  150 mcg Oral Daily Jeanna Pabon MD   150 mcg at 05/23/22 0526    isosorbide mononitrate (IMDUR) extended release tablet 120 mg  120 mg Oral Daily Lary Lennox, MD   120 mg at 05/22/22 3181    DULoxetine (CYMBALTA) extended release capsule 60 mg  60 mg Oral Daily Lary Lennox, MD   60 mg at 05/23/22 8917    collagenase ointment   Topical Daily Lary Lennox, MD   Given at 05/23/22 4031    sodium chloride flush 0.9 % injection 5-40 mL  5-40 mL IntraVENous 2 times per day Katelyn Gleason MD   10 mL at 05/22/22 2021    sodium chloride flush 0.9 % injection 5-40 mL  5-40 mL IntraVENous PRN Katelyn Gleason MD        0.9 % sodium chloride infusion   IntraVENous PRN Katelyn Gleason MD        [facility]) intermittent dosing (placeholder)   Other RX Oswaldo Galvan MD        aminoglycoside intermittent dosing (placeholder)   Other RX Placeholder Ky Gupta MD        acetylcysteine (MUCOMYST) 20 % solution 600 mg  600 mg Inhalation BID Ky Gupta MD   600 mg at 05/23/22 0655    ipratropium-albuterol (DUONEB) nebulizer solution 1 ampule  1 ampule Inhalation Q4H Ky Gupta MD   1 ampule at 05/23/22 1015    hydrALAZINE (APRESOLINE) injection 10 mg  10 mg IntraVENous Q6H PRN Ky Gupta MD   10 mg at 05/23/22 0304    aspirin chewable tablet 81 mg  81 mg Oral Daily Ky Gupta MD   81 mg at 05/23/22 0729    racepinephrine HCl (VAPONEFPRIN) 2.25 % nebulizer solution NEBU 11.25 mg  11.25 mg Nebulization Q4H PRN Ky Gupta MD   11.25 mg at 05/03/22 1408    sodium chloride nebulizer 0.9 % solution 3 mL  3 mL Nebulization Q4H PRN Ky Gupta MD        heparin (porcine) injection 5,000 Units  5,000 Units SubCUTAneous TID Ky Gupta MD   5,000 Units at 05/23/22 7420    Peppermint Spirit SPRT   Does not apply PRN Ky Gupta MD        menthol-zinc oxide (CALMOSEPTINE) 0.44-20.6 % ointment   Topical BID Lary Lennox, MD   Given at 05/23/22 0584    sodium phosphate 10.5 mmol in sodium chloride 0.9 % 250 mL IVPB  0.16 mmol/kg IntraVENous PRN Jesika Keyes MD        Or    sodium phosphate 21 mmol in sodium chloride 0.9 % 250 mL IVPB  0.32 mmol/kg IntraVENous PRN Jesika Keyes MD        hydrALAZINE (APRESOLINE) tablet 100 mg  100 mg Oral 3 times per day Jesika Keyes MD   100 mg at 05/23/22 0544    glucagon (rDNA) injection 1 mg  1 mg IntraMUSCular PRN Jesika Keyes MD        dextrose 5 % solution  100 mL/hr IntraVENous PRN Jesika Keyes MD        glucose chewable tablet 16 g  4 tablet Oral PRN Jesika Keyes MD        dextrose bolus (hypoglycemia) 10% 125 mL  125 mL IntraVENous PRN Jesika Keyes MD   Stopped at 05/11/22 2016    Or    dextrose bolus (hypoglycemia) 10% 250 mL  250 mL IntraVENous PRN Jesika Keyes MD   Stopped at 05/10/22 2026    LORazepam (ATIVAN) injection 1 mg  1 mg IntraVENous Q5 Min PRN Jesika Keyes MD        ondansetron (ZOFRAN-ODT) disintegrating tablet 4 mg  4 mg Oral Q8H PRN Jesika Keyes MD        Or    ondansetron [facility]) injection 4 mg  4 mg IntraVENous Q6H PRN Jesika Keyes MD        polyethylene glycol [facility]) packet 17 g  17 g Oral Daily PRN Jesika Keyes MD        acetaminophen (TYLENOL) tablet 650 mg  650 mg Oral Q6H PRN Jesika Keyes MD   650 mg at 05/21/22 1627    Or    acetaminophen (TYLENOL) suppository 650 mg  650 mg Rectal Q6H PRN Jesika Keyes MD        chlorhexidine (PERIDEX) 0.12 % solution 15 mL  15 mL Mouth/Throat BID Jesika Keyes MD   15 mL at 05/23/22 0724    famotidine (PEPCID) 20 mg in sodium chloride (PF) 10 mL injection  20 mg IntraVENous Daily Jesika Keyes MD   20 mg at 05/23/22 8037    magic butt cream   Topical Q4H PRN Jesika Keyes MD   Given at 05/03/22 0940    magnesium sulfate 1000 mg in dextrose 5% 100 mL IVPB  1,000 mg IntraVENous PRN Jesika Keyes MD        potassium bicarb-citric acid (EFFER-K) effervescent tablet 40 mEq  40 mEq Oral PRN Jesika Keyes MD        Or    potassium chloride 10 mEq/100 mL IVPB (Peripheral Line)  10 mEq IntraVENous PRN Steven Medrano MD   Stopped at 04/25/22 9172       Past Medical History:  Past Medical History:   Diagnosis Date    Acute ischemic stroke ([facility] 4/30/2022    Arthritis     Chronic kidney disease, stage 5, kidney failure (HCC)     Closed fracture of right shoulder girdle, with routine healing, subsequent encounter     DM (diabetes mellitus) type II controlled with renal manifestation ([facility] 06/1993    Gastroparesis     GERD (gastroesophageal reflux disease)     Hemodialysis patient ([facility]     dialysis on tues, thur, and sat at [facility]    Hypertension     Hypothyroid     Nasal congestion     recent    Neuropathy     Noncompliance with medications     Palliative care patient 10/08/2019    Restless leg syndrome        Past Surgical History:  Past Surgical History:   Procedure Laterality Date    BREAST SURGERY Left 2008    infected milk gland removed    BREAST SURGERY Right 5/25/2021    WEDGE EXCISION RIGHT BREAST DUCT WITH LACRIMAL DUCT PROBE, PEC BLOCK performed by Pedrito Ferris MD at [facility]      2008    COLONOSCOPY Left 9/17/2020    Dr Derick Leija hepatic flexure-Severe tortuosity with severe spasming, 1 yr recall    DIALYSIS FISTULA CREATION Left 1/25/2019    REVISION LEFT UPPER EXTREMITY AV ACCESS WITH LEFT BRACHIAL ARTERY TO LEFT AXILLARY VEIN WITH  INTERPOSITIONAL ARTEGRAFT   performed by Judit Tinoco MD at [address]  2012    GASTROSTOMY TUBE PLACEMENT N/A 5/10/2022    PERCUTANEOUS ENDOSCOPIC GASTROSTOMY TUBE PLACEMENT, performed by Steven Medrano MD at [address] [address] Right 1/25/2019    INSERTION OF RIGHT INTERNAL JUGULAR HEMODIALYSIS CATHETER performed by Judit Tinoco MD at [address] [place] [address]  08/17/2018    1.  Moderately increased stainable iron identified by special stain in Kupffer cells and occasional hepatocytes.  Negative for evidence of significant portal or lobular inflammation. Negative for evidence of significant fibrosis    [place] COLONOSCOPY W/BIOPSY SINGLE/MULTIPLE N/A 10/20/2017    Dr Jayshree Jeong prep-Tubular AP (-) dysplasia x 2--3 yr recall    [place] EGD TRANSORAL BIOPSY SINGLE/MULTIPLE N/A 2016    Dr Mariah Valencia EGD TRANSORAL BIOPSY SINGLE/MULTIPLE N/A 10/20/2017    Dr Remy [place] (-)    TRACHEOSTOMY  5/10/2022    TRACHEOSTOMY PERCUTANEOUS performed by Sorin Simeon MD at [facility] VASCULAR SURGERY Left 2016    fistula by Dr Deb Bowens at [address]  10/02/2017    [facility]. Left upper fistulograms/venograms, balloon angioplasty cephalic vein arch 60O78 conquest.    VASCULAR SURGERY  2018    FISTULOGRAM    VASCULAR SURGERY  10/29/2018    [facility]. Left upper fistulograms/venograms    VASCULAR SURGERY  2018    [facility]. Arch aortogram,left upper arteriograms.  VASCULAR SURGERY  2019    [facility]. Removal of tunneled dialyis catheter right internal jugular vein.  VASCULAR SURGERY  2019    [facility]. Left upper extremity fistulogram including venography to the superior vena cava. Balloon angioplasty left subclavian vein with 10mm x 40mm conquest balloon. Balloon angioplasty mid/proximal upper arm cephalic vein with 51HC x 40mm conquest balloon. Venograms after balloon angioplasty. Stent left mid/proximal upper arm cephalic vein stenosis fluency 10mm x 60mm self-expanding covered stent. Balloon     VASCULAR SURGERY  2019    cont angioplasty stent with 10mm x 40mm conquest balloon. Completion venograms left upper extremity.        Family History  Family History   Problem Relation Age of Onset    Colon Cancer Mother          at 63    Colon Polyps Mother     Lung Cancer Father          at 79    Colon Polyps Father     Stomach Cancer Sister     Breast Cancer Sister 27    Depression Daughter 25        committed suicide    Liver Cancer Neg Hx     Liver Disease Neg Hx     Esophageal Cancer Neg Hx     Rectal Cancer Neg Hx        Social History  Social History     Socioeconomic History    Marital status: Single     Spouse name: Not on file    Number of children: 2    Years of education: Not on file    Highest education level: Not on file   Occupational History    Not on file   Tobacco Use    Smoking status: Current Every Day Smoker     Packs/day: 0.50     Years: 33.00     Pack years: 16.50     Types: Cigarettes    Smokeless tobacco: Never Used    Tobacco comment: NOW at 1/4 PD, started at age 24 and has averaged 2 PPD   Vaping Use    Vaping Use: Never used   Substance and Sexual Activity    Alcohol use: No    Drug use: Not Currently     Types: Marijuana [place])    Sexual activity: Not Currently     Partners: Male   Other Topics Concern    Not on file   Social History Narrative    Not on file     Social Determinants of Health     Financial Resource Strain:     Difficulty of Paying Living Expenses: Not on file   Food Insecurity:     Worried About Running Out of Food in the Last Year: Not on file    Kendy of Food in the Last Year: Not on file   Transportation Needs:     Lack of Transportation (Medical): Not on file    Lack of Transportation (Non-Medical):  Not on file   Physical Activity:     Days of Exercise per Week: Not on file    Minutes of Exercise per Session: Not on file   Stress:     Feeling of Stress : Not on file   Social Connections:     Frequency of Communication with Friends and Family: Not on file    Frequency of Social Gatherings with Friends and Family: Not on file    Attends Baptism Services: Not on file    Active Member of Clubs or Organizations: Not on file    Attends Club or Organization Meetings: Not on file    Marital Status: Not on file   Intimate Partner Violence:     Fear of Current or Ex-Partner: Not on file    Emotionally Abused: Not on file    Physically Abused: Not on file    Sexually Abused: Not on file   Housing Stability:     Unable to Pay for Housing in the Last Year: Not on file    Number of Places Lived in the Last Year: Not on file    Unstable Housing in the Last Year: Not on file         Review of Systems:  History obtained from unobtainable from patient due to mental status        Objective:  Patient Vitals for the past 24 hrs:   BP Temp Temp src Pulse Resp SpO2 Weight   05/23/22 1015 -- -- -- -- 29 99 % --   05/23/22 1000 134/68 -- -- 93 29 100 % --   05/23/22 0900 (!) 165/78 -- -- 102 29 100 % --   05/23/22 0800 (!) 171/80 98.9 °F (37.2 °C) Temporal 100 (!) 33 100 % --   05/23/22 0700 (!) 168/74 -- -- 99 (!) 33 100 % --   05/23/22 0656 -- -- -- -- (!) 33 100 % --   05/23/22 0500 (!) 164/64 -- -- 97 29 100 % --   05/23/22 0400 (!) 165/71 98.7 °F (37.1 °C) Temporal 98 27 92 % 138 lb 1.6 oz (62.6 kg)   05/23/22 0300 (!) 162/68 -- -- 96 (!) 34 91 % --   05/23/22 0200 (!) 164/64 -- -- 94 (!) 33 100 % --   05/23/22 0100 (!) 165/65 -- -- 89 29 99 % --   05/23/22 0000 (!) 164/67 98.4 °F (36.9 °C) Temporal 84 26 100 % --   05/22/22 2300 (!) 156/64 -- -- 83 28 100 % --   05/22/22 2200 (!) 155/65 -- -- 84 29 100 % --   05/22/22 2100 (!) 170/68 -- -- 79 25 100 % --   05/22/22 2000 (!) 168/68 98.1 °F (36.7 °C) Temporal 77 26 100 % --   05/22/22 1900 (!) 166/77 -- -- 78 19 100 % --   05/22/22 1810 -- -- -- 76 -- -- --   05/22/22 1800 (!) 167/70 -- -- 76 23 100 % --   05/22/22 1700 (!) 169/69 -- -- 75 22 100 % --   05/22/22 1600 (!) 157/68 -- -- 77 17 100 % --   05/22/22 1500 (!) 152/70 -- -- 75 21 94 % --   05/22/22 1400 (!) 160/71 -- -- 81 26 92 % --   05/22/22 1352 (!) 157/73 -- -- -- -- -- --   05/22/22 1300 (!) 159/66 -- -- 82 25 98 % --   05/22/22 1200 (!) 144/66 99.1 °F (37.3 °C) Temporal 80 27 95 % --   05/22/22 1100 124/61 -- -- 84 28 100 % --       Intake/Output Summary (Last 24 hours) at 5/23/2022 1050  Last data filed at 5/23/2022 0800  Gross per 24 hour   Intake 970 ml   Output 370 ml   Net 600 ml     General: awake /unable to fully participate in examination due to altered mental status  Chest:  clear to auscultation bilaterally  CVS: regular rate and rhythm  Abdominal: soft, nontender, normal bowel sounds  Extremities: no cyanosis or edema  Skin: warm and dry without rash    Labs:  BMP:   Recent Labs     05/21/22 0108 05/22/22 0230 05/23/22 0134    136 132*   K 4.1 4.9 5.2*   CL 95* 91* 89*   CO2 28 26 25   PHOS 3.5 5.1* 6.1*   BUN 33* 56* 74*   CREATININE 1.8* 2.6* 3.2*   CALCIUM 9.0 9.0 9.1     CBC:   Recent Labs     05/21/22 0108 05/22/22 0230 05/23/22 0134   WBC 7.4 8.2 8.4   HGB 7.3* 7.6* 8.1*   HCT 24.4* 25.5* 26.9*   MCV 96.8 96.6 95.4    246 258     LIVER PROFILE:   Recent Labs     05/21/22 0108 05/22/22 0230 05/23/22 0134   AST 11 11 10   ALT 6 6 6   BILITOT 0.4 0.3 0.3   ALKPHOS 108* 116* 114*     PT/INR: No results for input(s): PROTIME, INR in the last 72 hours. APTT: No results for input(s): APTT in the last 72 hours. BNP:  No results for input(s): BNP in the last 72 hours. Ionized Calcium:No results for input(s): IONCA in the last 72 hours. Magnesium:  Recent Labs     05/21/22 0108 05/22/22 0230 05/23/22 0134   MG 2.2 2.5 2.5     Phosphorus:  Recent Labs     05/21/22 0108 05/22/22 0230 05/23/22 0134   PHOS 3.5 5.1* 6.1*     HgbA1C: No results for input(s): LABA1C in the last 72 hours. Hepatic:   Recent Labs     05/21/22 0108 05/22/22 0230 05/23/22 0134   ALKPHOS 108* 116* 114*   ALT 6 6 6   AST 11 11 10   PROT 5.8* 6.0* 5.8*   BILITOT 0.4 0.3 0.3   LABALBU 3.0* 3.0* 2.8*     Lactic Acid: No results for input(s): LACTA in the last 72 hours. Troponin: No results for input(s): CKTOTAL, CKMB, TROPONINT in the last 72 hours. ABGs:   Lab Results   Component Value Date    PHART 7.620 05/19/2022    PO2ART 127.0 05/19/2022    ZTN9ADJ 28.0 05/19/2022     CRP:  No results for input(s): CRP in the last 72 hours. Sed Rate:  No results for input(s): SEDRATE in the last 72 hours.     Culture Results:   Blood Culture Recent:   Recent Labs     05/21/22  1901   BC No Growth to date. Any change in status will be called. Urine Culture Recent :   Recent Labs     05/22/22  [facility] No growth       Radiology reports as per the Radiologist  Radiology: CT Head WO Contrast    Result Date: 4/24/2022  CT HEAD WO CONTRAST 4/24/2022 2:01 PM HISTORY: Altered mental status COMPARISON: CT scan dated 11/18/2021 DOSE LENGTH PRODUCT: 766 mGy cm TECHNIQUE: Helical tomographic images of the brain were obtained without the use of intravenous contrast. Automated exposure control was also utilized to decrease patient radiation dose. FINDINGS: There is no evidence of evolving large vascular territory infarct. No visualized intra-axial or extra-axial hemorrhage. No mass lesion is identified. Normal size and configuration of the ventricular system. The basal cisterns are symmetric. Posterior fossa structures are unremarkable. The included orbits and their contents are unremarkable. Chronic paranasal sinus disease on the left. The visualized osseous structures and overlying soft tissues of the skull and face are unremarkable. 1. No acute intracranial process. Signed by Dr Babs Valdes    XR CHEST PORTABLE    Result Date: 5/4/2022  XR CHEST PORTABLE 5/4/2022 4:40 AM HISTORY: Mechanical ventilation Comparison: 5/3/2022 Findings: Lines and tubes are stable in position. Minimal bibasilar atelectasis. Lungs are otherwise clear. No pleural effusion or pneumothorax. The cardiomediastinal silhouette and pulmonary vascularity are unchanged. No acute osseous or soft tissue abnormality is noted. Impression: 1. Minimal bibasilar atelectasis. Otherwise stable. Signed by Dr Babs Valdes    XR CHEST PORTABLE    Result Date: 5/3/2022  XR CHEST PORTABLE 5/3/2022 4:03 PM HISTORY: Postintubation film  Technique: Single AP view of the chest COMPARISONS: 4/24/2022 FINDINGS: Endotracheal tube is identified with tip approximately 2.2 cm above the krysta. Lungs are clear and well expanded. No pleural effusion or pneumothorax. Heart size is normal. Pulmonary vasculature are nondilated. Enteric tube is in good position with tip projecting over the gastric antrum. No acute bony abnormality. Left subclavian region vascular stent. 1. Status post endotracheal intubation with tube tip approximately 2.2 cm above the krysta. Lungs are clear and well expanded. 2. Enteric tube is in good position. Signed by Dr Gertrudis Matthews    Result Date: 4/24/2022  XR CHEST PORTABLE 4/24/2022 4:27 PM HISTORY: ET tube, enteric tube  Technique: Single AP view of the chest COMPARISONS: 4/24/2022 FINDINGS: Endotracheal tube is identified with tip essentially touching the krysta. Enteric tube courses into the stomach with tip curled in the fundus back towards the gastroesophageal junction. The lungs are clear and well expanded. Heart size is stable. Pulmonary vasculature are nondilated. No pleural effusion or pneumothorax. 1. Endotracheal tube is deep, tip essentially touching the krysta. Lungs are clear and well expanded. I would recommend 2-3 cm withdrawal for a more optimal positioning. The results of this exam were discussed with Marsha Taveras (ICU nursing) on 4/24/2022 at 1640 hours Signed by Dr Justice Landaverde    XR CHEST PORTABLE    Result Date: 4/24/2022  XR CHEST PORTABLE 4/24/2022 1:17 PM HISTORY: ET tube placement  Technique: Single AP view of the chest COMPARISONS: 4/24/2022 FINDINGS: Status post endotracheal intubation. ET tube is in good position. Lungs are well-expanded. Enteric tube curls on itself in the midesophagus and then extends back up into the throat. Heart size is stable. Pulmonary vasculature are nondilated. 1. ET tube is in good position. Lungs are clear and well expanded. 2. Enteric tube curls on itself in the mid esophagus before extending back up into the throat. This will need repositioned.  The results of this exam were discussed with Dr. JUÁREZ [facility] on 4/24/2022 at 1329 hours Signed by Dr Gabriela Bosch    XR CHEST PORTABLE    Result Date: 4/24/2022  XR CHEST PORTABLE 4/24/2022 12:34 PM HISTORY: Altered mental status  Technique: Single AP view of the chest COMPARISONS: Chest exam dated 4/1/2022 FINDINGS: No lung consolidation. No pleural effusion or pneumothorax. Cardiomediastinal silhouette and pulmonary vasculature are unremarkable. No acute bony abnormality. Left subclavian region vascular stent with additional stent projecting over the left humerus. No acute cardiopulmonary process. Signed by Dr Gabriela Bosch    VL DUP CAROTID BILATERAL    Result Date: 4/29/2022  Vascular Carotid Procedure  Demographics   Patient Name    Alean Goodpasture Age                   47   Patient Number  [id number]       Gender                Female   Visit Number    [id number]    Interpreting          France Thompson                               Physician   Date of Birth   1970   Referring Physician   Quentin Kumari   Accession       [id number]   [address], [place], [address]  Number  Procedure Type of Study:   Cerebral:Carotid, VL CAROTID BILATERAL. Indications for Study:Stroke. Risk Factors   - The patient's risk factor(s) include: diabetes mellitus and arterial     hypertension.   - Current - Every day. Allergies   - Penicillins.   - Stadol.   - Other allergy:(Lamisil Advanced). Technical Quality:Limited visualization due to patient on ventilator. Impression   There is mixed plaque visualized in the bilateral internal carotid  artery(ies). There is less than 50% stenosis in the right internal carotid artery. There is less than 50% stenosis of the left internal carotid artery. There is normal antegrade flow in the bilateral vertebral artery(ies). right side is very limited due to positioning, her chin is resting on her  right clavicle and she is vented.    Signature   ----------------------------------------------------------------  Electronically signed by Bernabe Leon(Interpreting  physician) on 04/29/2022 03:20 PM  ----------------------------------------------------------------  Blood Pressure:Right arm 178/69 mmHg. Velocities are measured in cm/s ; Diameters are measured in mm Carotid Right Measurements +------------+-------+-------+--------+-------+------------+---------------+ ! Location    ! PSV    ! EDV    ! Angle   ! RI     !%Stenosis   ! Tortuosity     ! +------------+-------+-------+--------+-------+------------+---------------+ ! Prox CCA    !47.2   !       !60      !       !            !               ! +------------+-------+-------+--------+-------+------------+---------------+ ! Mid CCA     !47.2   !       !60      !       !            !               ! +------------+-------+-------+--------+-------+------------+---------------+ ! Prox ICA    !41     !7.46   !60      !0.82   !            !               ! +------------+-------+-------+--------+-------+------------+---------------+ ! Mid ICA     !62.7   !11.2   !60      !0.82   !            !               ! +------------+-------+-------+--------+-------+------------+---------------+ ! Dist ICA    !75.2   !13.7   !60      !0.82   !            !               ! +------------+-------+-------+--------+-------+------------+---------------+ ! Prox ECA    !147    !       !61      !       !            !               ! +------------+-------+-------+--------+-------+------------+---------------+ ! Vertebral   !47.2   !13.7   !60      !0.71   !            !               ! +------------+-------+-------+--------+-------+------------+---------------+   - There is antegrade vertebral flow noted on the right side. - Additional Measurements:ICAPSV/CCAPSV 1.59. Carotid Left Measurements +------------+-------+-------+--------+-------+------------+---------------+ ! Location    ! PSV    ! EDV    ! Angle   ! RI     !%Stenosis   ! Tortuosity     ! +------------+-------+-------+--------+-------+------------+---------------+ ! Prox CCA    !81.4   !9.94   !60      !0.88   !            !               ! +------------+-------+-------+--------+-------+------------+---------------+ ! Mid CCA     !70.2   !7.46   !60      !0.89   !            !               ! +------------+-------+-------+--------+-------+------------+---------------+ ! Prox ICA    !69.4   !11.9   !60      !0.83   !            !               ! +------------+-------+-------+--------+-------+------------+---------------+ ! Mid ICA     !80.6   !19.6   !60      !0.76   !            !               ! +------------+-------+-------+--------+-------+------------+---------------+ ! Dist ICA    !70.3   !12.5   !50      !0.82   !            !               ! +------------+-------+-------+--------+-------+------------+---------------+ ! Prox ECA    !113    !       !61      !       !            !               ! +------------+-------+-------+--------+-------+------------+---------------+ ! Vertebral   !38.8   !       !60      !       !            !               ! +------------+-------+-------+--------+-------+------------+---------------+   - There is antegrade vertebral flow noted on the left side. - Additional Measurements:ICAPSV/CCAPSV 0.99. ICAEDV/CCAEDV 1.97. MRI BRAIN WO CONTRAST    Result Date: 4/29/2022  Examination. MRI BRAIN WO CONTRAST 4/29/2022 11:43 AM History: Seizure activity. The multiplanar, multisequence MR imaging of the brain is performed without contrast enhancement. There is no previous similar study for comparison. The correlation made with CT scan of the head dated 4/24/2022. The diffusion-weighted imaging sequence including the ADC map show an area of acute infarction involving the left posterior frontal, parietal and basal occipital lobe, the left middle cerebral artery territory. A small linear focus of T2 signal in the right posterior paraventricular white matter has no corresponding ADC map changes and may represent a subacute or chronic process.  The targeted images of the mesial temporal lobe show moderate symmetrical mesial temporal atrophy without presence of any abnormal signal. This may be a process of general volume loss/atrophy. Moderately dilated ventricles, basal cisterns and the cortical sulci suggestive chronic volume loss/atrophy. The orbits are suboptimally evaluated. No obvious abnormality. Limited visualized optic nerve, optic chiasma and optic tracts are normal and symmetrical. The pituitary gland appears normal. The corpus callosum, the brainstem and cerebellum are normal. The FLAIR sequence images show significant periventricular T2 signal changes representing chronic white matter ischemia and transependymal CSF flow. The gradient recalled imaging sequence show normal flow void in the visualized intracranial vessels, particularly normal flow in the limited visualized left middle cerebral artery. There is mucosal thickening involving the ethmoid sinuses. The bilateral mastoid effusion. 1. Acute left middle cerebral territory infarct. 2. Chronic ischemic and atrophic changes. 3. The symmetrical mesial temporal atrophy is probably a part of the generalized volume loss. 4. Ethmoid sinusitis. Bilateral mastoid effusion. The above report was immediately called to the nurse in charge of the patient in CCU. Signed by Dr Viktor Lucas    Result Date: 4/29/2022  Transthoracic Echocardiography Report (TTE)  Demographics   Patient Name  [facility] Date of Study          04/29/2022   MRN           [id number]       Gender                 Female   Date of Birth 1970   Room Number            [facility]-0702   Age           47 year(s)   Height:       66 inches    Referring Physician    Varun Miranda   Weight:       144 pounds   Sonographer            Jessie Madera [facility]   BSA:          1.74 m^2     Interpreting Physician Irma Mari MD   BMI:          23.24 kg/m^2  Procedure Type of Study   TTE procedure:ECHO NO CONTRAST WITH DOP/COLR. Study Location: Portable Technical Quality: Adequate visualization Patient Status: Inpatient Contrast Medium: Bubble Study. Rhythm: Within normal limits HR: 57 bpm BP: 178/69 mmHg Indications:Stroke. Conclusions   Summary  Mitral valve leaflets are mildly thickened with preserved leaflet  mobility. Mild aortic stenosis. Mean gradient 14 mm hg  Tricuspid valve is structurally normal.  Mild tricuspid regurgitation. Moderately dilated left atrium. Normal intact intra-atrial septum was noted with no evidence of  significant intra-atrial communications by color flow Doppler or by  agitated saline study. Normal left ventricular size with preserved LV function and an estimated  ejection fraction of approximately 55-60%. Moderate concentric left ventricular hypertrophy. No regional wall motion abnormalities. Grade II diastolic dysfunction   Signature   ----------------------------------------------------------------  Electronically signed by oRsalba Macias MD(Interpreting  physician) on 04/30/2022 11:02 AM  ----------------------------------------------------------------   Findings   Mitral Valve  Mitral valve leaflets are mildly thickened with preserved leaflet  mobility. Aortic Valve  Mild aortic stenosis. Mean gradient 14 mm hg   Tricuspid Valve  Tricuspid valve is structurally normal.  Mild tricuspid regurgitation. Pulmonic Valve  The pulmonic valve was not well visualized . Left Atrium  Moderately dilated left atrium. Normal intact intra-atrial septum was noted with no evidence of  significant intra-atrial communications by color flow Doppler or by  agitated saline study. Left Ventricle  Normal left ventricular size with preserved LV function and an estimated  ejection fraction of approximately 55-60%. Moderate concentric left ventricular hypertrophy. No regional wall motion abnormalities.   Grade II diastolic dysfunction   Right Atrium  Normal right atrial dimension with no evidence of thrombus or mass noted. Right Ventricle  Normal right ventricular size with preserved RV function. Pericardial Effusion  No evidence of significant pericardial effusion is noted. Pleural Effusion  No evidence of pleural effusion. Allergies   - Penicillins.   - Stadol.   - Other allergy:(Lamisil Advanced). 2D Measurements and Calculations(cm)   LVIDd: 4.4 cm                       LVIDs: 3.14 cm  IVSd: 1.41 cm  LVPWd: 1.43 cm                      AO Root Dimension: 2.4 cm  Rt. Vent.  Dimension: 2.64 cm        LA Dimension: 3.8 cm  % Ejection Fraction: 55 %           LA Area: 24.3 cm^2  LA Volume: 86.1 ml                  LV Systolic dimension: 7.99QW  LA Volume Index: 49 ml/m^2          LV PW diastolic: 5.57EO  LV dimension: 4.4 cm                LVOT diameter: 2 cm                                      RV Diastolic dimension: 1.92JB  LVEDV: 115 ml                       LVEDVI: 66 ml/m^2  LVESV:36.3 ml                       LVESVI: 21 ml/m^2  Cardic Output (CO): 5.53l/min  Doppler Measurements and Calculations   AV Peak Velocity:257 cm/s              MV Peak E-Wave: 82.3 cm/s  AV Mean Velocity:178 cm/s              MV Peak A-Wave: 76.7 cm/s  AV Peak Gradient: 26.42 mmHg           MV E/A Ratio: 1.07 %  AV Mean Gradient: 14 mmHg              MV Mean velocity: 64.8cm/s  AV Area (Continuity):1.85 cm^2         MV Peak Gradient: 2.71 mmHg  AV VTI:52.4 cm/s                       MV Mean gradient: 2 mmHg  TR Velocity:207 cm/s  TR Gradient:17.14 mmHg   MV E' septal velocity: 5.66cm/s  MV E' lateral velocity:5.66 cm/s         Assessment   ESRD  DM2 with nephropathy on long-term insulin, uncontrolled  HTN  Hyponatremia  Hypokalemia  Secondary Hyperparathyroidism  Anemia CKD  Seizure disorder  Hypothyroidism   Acute ischemic cerebral infarction (left MCA distribution)    Plan:  Dialysis per routine scheduling (Monday Wednesday Friday currently)  Ultrafiltration as tolerated  Monitor labs  Neuro evaluation reviewed        Elizabeth Tobar Alpesh Candelaria MD  05/23/22  10:50 AM

## 2022-05-23 NOTE — PROGRESS NOTES
Pharmacy Aminoglycoside Consult    Aminoglycoside: Gentamicin  Day: 16  Current Dosing: Pulse dosing for HD    Temp max: 98.1    Estimated Creatinine Clearance: 19 mL/min (A) (based on SCr of 3.2 mg/dL (H)). Recent Labs     05/22/22  0230 05/23/22  0134   BUN 56* 74*       Recent Labs     05/22/22  0230 05/23/22 0134   CREATININE 2.6* 3.2*       Recent Labs     05/22/22 0230 05/23/22  0134   WBC 8.2 8.4     Culture Date Source Results   05/06/22 Respiratory MRSA; Raoultella ornithinolytica; Proteus vulgaris   05/21/22 Blood No growth to date   05/21/22 Respiratory Moderate WBC's (Polymorphonuclear)   Moderate Mixed bacterial morphotypes suggestive of   Normal respiratory david        Random Level pre-HD: 4.6 drawn 05/23 0134    ASSESSMENT/PLAN: Give Gentamicin 3mg/kg x 1 dose today after HD. Random level prior to HD on Friday, as patient for past 2 weeks has only needed doses on Mondays & Fridays.      Electronically signed by RAHUL Cosme [facility] on 5/23/2022 at 3:00 AM

## 2022-05-23 NOTE — PROGRESS NOTES
Hospitalist Progress Note  [facility]     Patient: Frances Vera  : 1970  MRN: [id number]  Code Status: Full Code    Hospital Day: 29   Date of Service: 2022    Subjective:   Patient seen and examined. Trached.      Past Medical History:   Diagnosis Date    Acute ischemic stroke ([facility] [address] 2022    Arthritis     Chronic kidney disease, stage 5, kidney failure (HCC)     Closed fracture of right shoulder girdle, with routine healing, subsequent encounter     DM (diabetes mellitus) type II controlled with renal manifestation ([facility] [address] [facility] 1993    Gastroparesis     GERD (gastroesophageal reflux disease)     Hemodialysis patient ([facility] [address]     dialysis on tues, thur, and sat at [facility]    Hypertension     Hypothyroid     Nasal congestion     recent    Neuropathy     Noncompliance with medications     Palliative care patient 10/08/2019    Restless leg syndrome        Past Surgical History:   Procedure Laterality Date    BREAST SURGERY Left     infected milk gland removed    BREAST SURGERY Right 2021    WEDGE EXCISION RIGHT BREAST DUCT WITH LACRIMAL DUCT PROBE, PEC BLOCK performed by Johny Lucio MD at [address] [facility]          COLONOSCOPY Left 2020    Dr Jodie Vicente hepatic flexure-Severe tortuosity with severe spasming, 1 yr recall    DIALYSIS FISTULA CREATION Left 2019    REVISION LEFT UPPER EXTREMITY AV ACCESS WITH LEFT BRACHIAL ARTERY TO LEFT AXILLARY VEIN WITH  INTERPOSITIONAL ARTEGRAFT   performed by Amor Radford MD at [address]      GASTROSTOMY TUBE PLACEMENT N/A 5/10/2022    PERCUTANEOUS ENDOSCOPIC GASTROSTOMY TUBE PLACEMENT, performed by Emily Wallace MD at [address] Right 2019    INSERTION OF RIGHT INTERNAL JUGULAR HEMODIALYSIS CATHETER performed by Amor Radford MD at [address] [facility]  2018    1.  Moderately increased stainable iron identified by special stain in Kupffer cells and occasional hepatocytes. Negative for evidence of significant portal or lobular inflammation. Negative for evidence of significant fibrosis    DC COLONOSCOPY W/BIOPSY SINGLE/MULTIPLE N/A 10/20/2017    Dr Wilfred Herrera prep-Tubular AP (-) dysplasia x 2--3 yr recall    DC EGD TRANSORAL BIOPSY SINGLE/MULTIPLE N/A 2016    Dr Huber Diaz EGD TRANSORAL BIOPSY SINGLE/MULTIPLE N/A 10/20/2017    Dr Nicolle Blake (-)    TRACHEOSTOMY  5/10/2022    TRACHEOSTOMY PERCUTANEOUS performed by Blanche Jameson MD at [address] VASCULAR SURGERY Left 2016    fistula by Dr Tony Love at [address]  10/02/2017    [facility]. Left upper fistulograms/venograms, balloon angioplasty cephalic vein arch [id number] conquest.    VASCULAR SURGERY  2018    FISTULOGRAM    VASCULAR SURGERY  10/29/2018    [facility]. Left upper fistulograms/venograms    VASCULAR SURGERY  2018    [facility]. Arch aortogram,left upper arteriograms.  VASCULAR SURGERY  2019    [facility]. Removal of tunneled dialyis catheter right internal jugular vein.  VASCULAR SURGERY  2019    [facility]. Left upper extremity fistulogram including venography to the superior vena cava. Balloon angioplasty left subclavian vein with 10mm x 40mm conquest balloon. Balloon angioplasty mid/proximal upper arm cephalic vein with 66XV x 40mm conquest balloon. Venograms after balloon angioplasty. Stent left mid/proximal upper arm cephalic vein stenosis fluency 10mm x 60mm self-expanding covered stent. Balloon     VASCULAR SURGERY  2019    cont angioplasty stent with 10mm x 40mm conquest balloon. Completion venograms left upper extremity.        Family History   Problem Relation Age of Onset    Colon Cancer Mother          at 63    Colon Polyps Mother     Lung Cancer Father          at 66    Colon Polyps Father     Stomach Cancer Sister     Breast Cancer Sister 27    Depression Daughter 25        committed suicide    Liver Cancer Neg Hx     Liver Disease Neg Hx     Esophageal Cancer Neg Hx     Rectal Cancer Neg Hx        Social History     Socioeconomic History    Marital status: Single     Spouse name: Not on file    Number of children: 2    Years of education: Not on file    Highest education level: Not on file   Occupational History    Not on file   Tobacco Use    Smoking status: Current Every Day Smoker     Packs/day: 0.50     Years: 33.00     Pack years: 16.50     Types: Cigarettes    Smokeless tobacco: Never Used    Tobacco comment: NOW at 1/4 PD, started at age 24 and has averaged 2 PPD   Vaping Use    Vaping Use: Never used   Substance and Sexual Activity    Alcohol use: No    Drug use: Not Currently     Types: Marijuana Garon Kettle)    Sexual activity: Not Currently     Partners: Male   Other Topics Concern    Not on file   Social History Narrative    Not on file     Social Determinants of Health     Financial Resource Strain:     Difficulty of Paying Living Expenses: Not on file   Food Insecurity:     Worried About Running Out of Food in the Last Year: Not on file    Kendy of Food in the Last Year: Not on file   Transportation Needs:     Lack of Transportation (Medical): Not on file    Lack of Transportation (Non-Medical):  Not on file   Physical Activity:     Days of Exercise per Week: Not on file    Minutes of Exercise per Session: Not on file   Stress:     Feeling of Stress : Not on file   Social Connections:     Frequency of Communication with Friends and Family: Not on file    Frequency of Social Gatherings with Friends and Family: Not on file    Attends [employer] Services: Not on file    Active Member of Clubs or Organizations: Not on file    Attends Club or Organization Meetings: Not on file    Marital Status: Not on file   Intimate Partner Violence:     Fear of Current or Ex-Partner: Not on file    Emotionally Abused: Not on file    Physically Abused: Not on file   Floydene Ada Sexually Abused: Not on file   Housing Stability:     Unable to Pay for Housing in the Last Year: Not on file    Number of Places Lived in the Last Year: Not on file    Unstable Housing in the Last Year: Not on file       Current Facility-Administered Medications   Medication Dose Route Frequency Provider Last Rate Last Admin    gentamicin (GARAMYCIN) 185.6 mg in dextrose 5 % 100 mL IVPB  3 mg/kg IntraVENous Once Tristian Clines, DO        lisinopril (PRINIVIL;ZESTRIL) tablet 20 mg  20 mg Per NG tube BID Gogo Wagner MD   20 mg at 05/23/22 1208    busPIRone (BUSPAR) tablet 5 mg  5 mg Oral TID Hadley Reese MD   5 mg at 05/23/22 0729    vancomycin (VANCOCIN) 500 mg in dextrose 5 % 100 mL IVPB (mini-bag)  500 mg IntraVENous Once Tristian Clines, DO        LORazepam (ATIVAN) injection 0.5 mg  0.5 mg IntraVENous Q6H PRN Connie Rico DO   0.5 mg at 05/22/22 0328    insulin glargine (LANTUS) injection vial 8 Units  8 Units SubCUTAneous Nightly Hadley Reese MD   8 Units at 05/22/22 2046    sodium zirconium cyclosilicate (LOKELMA) oral suspension 10 g  10 g Oral TID PRN Gogo Wagner MD        sevelamer (RENVELA) packet 0.8 g  0.8 g Oral TID  Hadley Reese MD   0.8 g at 05/23/22 1209    insulin lispro (HUMALOG) injection vial 0-6 Units  0-6 Units SubCUTAneous TID  Hadley Reese MD   3 Units at 05/23/22 1272    insulin lispro (HUMALOG) injection vial 0-3 Units  0-3 Units SubCUTAneous Nightly Hadley Reese MD   1 Units at 05/21/22 1930    levETIRAcetam (KEPPRA) 100 MG/ML solution 500 mg  500 mg Per G Tube BID Pedro Parmar MD   500 mg at 05/23/22 0729    atorvastatin (LIPITOR) tablet 20 mg  20 mg Oral Daily Hadley Reese MD   20 mg at 05/23/22 0729    rOPINIRole (REQUIP) tablet 4 mg  4 mg Oral Nightly Hadley Reese MD   4 mg at 05/22/22 2019    NIFEdipine (PROCARDIA XL) extended release tablet 90 mg  90 mg Oral Daily Hadley Reese MD   90 mg at 05/23/22 1208    metoprolol succinate (TOPROL XL) extended release tablet 100 mg  100 mg Oral QAM AC Leda Lugo MD   100 mg at 05/23/22 1209    levothyroxine (SYNTHROID) tablet 150 mcg  150 mcg Oral Daily Leda Lugo MD   150 mcg at 05/23/22 0544    isosorbide mononitrate (IMDUR) extended release tablet 120 mg  120 mg Oral Daily Leda Lugo MD   120 mg at 05/22/22 7450    DULoxetine (CYMBALTA) extended release capsule 60 mg  60 mg Oral Daily Leda Lugo MD   60 mg at 05/23/22 1190    collagenase ointment   Topical Daily Leda Lugo MD   Given at 05/23/22 5191    sodium chloride flush 0.9 % injection 5-40 mL  5-40 mL IntraVENous 2 times per day Irais Barrios MD   10 mL at 05/22/22 2021    sodium chloride flush 0.9 % injection 5-40 mL  5-40 mL IntraVENous PRN Irais Barrios MD        0.9 % sodium chloride infusion   IntraVENous PRN Irais Barrios MD        vancomycin Ashlee Beckman) intermittent dosing (placeholder)   Other RX Edilma Mathias MD        aminoglycoside intermittent dosing (placeholder)   Other RX Placeholder Danisha Mukherjee MD        acetylcysteine (MUCOMYST) 20 % solution 600 mg  600 mg Inhalation BID Danisha Mukherjee MD   600 mg at 05/23/22 0655    ipratropium-albuterol (DUONEB) nebulizer solution 1 ampule  1 ampule Inhalation Q4H Danisha Mukherjee MD   1 ampule at 05/23/22 1015    hydrALAZINE (APRESOLINE) injection 10 mg  10 mg IntraVENous Q6H PRN Danisha Mukherjee MD   10 mg at 05/23/22 0304    aspirin chewable tablet 81 mg  81 mg Oral Daily Danisha Mukherjee MD   81 mg at 05/23/22 0729    racepinephrine HCl (VAPONEFPRIN) 2.25 % nebulizer solution NEBU 11.25 mg  11.25 mg Nebulization Q4H PRN Danisha Mukherjee MD   11.25 mg at 05/03/22 1408    sodium chloride nebulizer 0.9 % solution 3 mL  3 mL Nebulization Q4H PRN Danisha Mukherjee MD        heparin (porcine) injection 5,000 Units  5,000 Units SubCUTAneous TID Danisha Mukherjee MD   5,000 Units at 05/23/22 5729    Peppermint Spirit SPRT   Does not apply PRN Jean Betancourt MD        menthol-zinc oxide (CALMOSEPTINE) 0.44-20.6 % ointment   Topical BID Greyson Salvador MD   Given at 05/23/22 0545    sodium phosphate 10.5 mmol in sodium chloride 0.9 % 250 mL IVPB  0.16 mmol/kg IntraVENous PRN Jean Betancourt MD        Or    sodium phosphate 21 mmol in sodium chloride 0.9 % 250 mL IVPB  0.32 mmol/kg IntraVENous PRN Jean Betancourt MD        hydrALAZINE (APRESOLINE) tablet 100 mg  100 mg Oral 3 times per day Jean Betancourt MD   100 mg at 05/23/22 0544    glucagon (rDNA) injection 1 mg  1 mg IntraMUSCular PRN Jean Betancourt MD        dextrose 5 % solution  100 mL/hr IntraVENous PRN Jean Betancourt MD        glucose chewable tablet 16 g  4 tablet Oral PRN Jean Betancourt MD        dextrose bolus (hypoglycemia) 10% 125 mL  125 mL IntraVENous PRN Jean Betancourt MD   Stopped at 05/11/22 2016    Or    dextrose bolus (hypoglycemia) 10% 250 mL  250 mL IntraVENous PRN Jean Betancourt MD   Stopped at 05/10/22 2026    LORazepam (ATIVAN) injection 1 mg  1 mg IntraVENous Q5 Min PRN Jean Betancourt MD        ondansetron (ZOFRAN-ODT) disintegrating tablet 4 mg  4 mg Oral Q8H PRN Jean Betancourt MD        Or    ondansetron [facility]) injection 4 mg  4 mg IntraVENous Q6H PRN Jean Betancourt MD        polyethylene glycol [facility]) packet 17 g  17 g Oral Daily PRN Jean Betancourt MD        acetaminophen (TYLENOL) tablet 650 mg  650 mg Oral Q6H PRN Jean Betancourt MD   650 mg at 05/21/22 1627    Or    acetaminophen (TYLENOL) suppository 650 mg  650 mg Rectal Q6H PRN Jean Betancourt MD        chlorhexidine (PERIDEX) 0.12 % solution 15 mL  15 mL Mouth/Throat BID Jean Betancourt MD   15 mL at 05/23/22 0724    famotidine (PEPCID) 20 mg in sodium chloride (PF) 10 mL injection  20 mg IntraVENous Daily Jean Betancourt MD   20 mg at 05/23/22 1412    magic butt cream   Topical Q4H PRN Jean Betancourt MD   Given at 05/03/22 3450    magnesium sulfate 1000 mg in dextrose 5% 100 mL IVPB  1,000 mg IntraVENous PRN Chris Joe MD        potassium bicarb-citric acid (EFFER-K) effervescent tablet 40 mEq  40 mEq Oral PRN Chris Joe MD        Or    potassium chloride 10 mEq/100 mL IVPB (Peripheral Line)  10 mEq IntraVENous PRN Chris Joe MD   Stopped at 04/25/22 0936         sodium chloride      dextrose          Objective:   BP (!) 161/69   Pulse 91   Temp 98.9 °F (37.2 °C) (Temporal)   Resp 25   Ht 5' 6\" (1.676 m)   Wt 138 lb 1.6 oz (62.6 kg)   SpO2 99%   BMI 22.29 kg/m²     General: no acute distress  HEENT: normocephalic, atraumatic  Lungs: faint rhonchi  Cardiovascular: s1 and s2 normal  Abdomen: soft, positive bowel sounds  Extremities: no cyanosis   Neuro: trached    Recent Labs     05/21/22 0108 05/22/22 0230 05/23/22  0134   WBC 7.4 8.2 8.4   RBC 2.52* 2.64* 2.82*   HGB 7.3* 7.6* 8.1*   HCT 24.4* 25.5* 26.9*   MCV 96.8 96.6 95.4   MCH 29.0 28.8 28.7   MCHC 29.9* 29.8* 30.1*    246 258     Recent Labs     05/21/22 0108 05/22/22 0230 05/23/22  0134    136 132*   K 4.1 4.9 5.2*   ANIONGAP 14 19 18   CL 95* 91* 89*   CO2 28 26 25   BUN 33* 56* 74*   CREATININE 1.8* 2.6* 3.2*   GLUCOSE 187* 217* 144*   CALCIUM 9.0 9.0 9.1     Recent Labs     05/21/22 0108 05/22/22  0230 05/23/22  0134   MG 2.2 2.5 2.5   PHOS 3.5 5.1* 6.1*     Recent Labs     05/21/22 0108 05/22/22  0230 05/23/22  0134   AST 11 11 10   ALT 6 6 6   BILITOT 0.4 0.3 0.3   ALKPHOS 108* 116* 114*     No results for input(s): PH, PO2, PCO2, HCO3, BE, O2SAT in the last 72 hours. No results for input(s): TROPONINI in the last 72 hours. No results for input(s): INR in the last 72 hours. No results for input(s): LACTA in the last 72 hours. Intake/Output Summary (Last 24 hours) at 5/23/2022 1351  Last data filed at 5/23/2022 0800  Gross per 24 hour   Intake 841 ml   Output 320 ml   Net 521 ml       No results found.      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 William Del Castillo collar 6 L     ESRD on HD  Renal following     Medical noncompliance       C. difficile infection  Completed oral vancomycin course     Tobacco dependence       IDDM 2  Meds on board     DVT prophylaxis  Heparin     Dispo:  LTAC eval in progress    Total critical care time: 40 minutes    Kim Bobby MD   5/23/2022 1:51 PM

## 2022-05-24 LAB
ALBUMIN SERPL-MCNC: 2.8 G/DL (ref 3.5–5.2)
ALP BLD-CCNC: 120 U/L (ref 35–104)
ALT SERPL-CCNC: 70 U/L (ref 5–33)
ANION GAP SERPL CALCULATED.3IONS-SCNC: 16 MMOL/L (ref 7–19)
AST SERPL-CCNC: 139 U/L (ref 5–32)
BASOPHILS ABSOLUTE: 0.1 K/UL (ref 0–0.2)
BASOPHILS RELATIVE PERCENT: 1 % (ref 0–1)
BILIRUB SERPL-MCNC: 0.3 MG/DL (ref 0.2–1.2)
BUN BLDV-MCNC: 37 MG/DL (ref 6–20)
CALCIUM SERPL-MCNC: 8.9 MG/DL (ref 8.6–10)
CHLORIDE BLD-SCNC: 91 MMOL/L (ref 98–111)
CO2: 28 MMOL/L (ref 22–29)
CREAT SERPL-MCNC: 1.9 MG/DL (ref 0.5–0.9)
CULTURE, RESPIRATORY: ABNORMAL
EOSINOPHILS ABSOLUTE: 0.2 K/UL (ref 0–0.6)
EOSINOPHILS RELATIVE PERCENT: 2.7 % (ref 0–5)
GFR AFRICAN AMERICAN: 33
GFR NON-AFRICAN AMERICAN: 28
GLUCOSE BLD-MCNC: 227 MG/DL (ref 74–109)
GLUCOSE BLD-MCNC: 232 MG/DL (ref 70–99)
GLUCOSE BLD-MCNC: 257 MG/DL (ref 70–99)
GLUCOSE BLD-MCNC: 283 MG/DL (ref 70–99)
GRAM STAIN RESULT: ABNORMAL
HCT VFR BLD CALC: 27.2 % (ref 37–47)
HEMOGLOBIN: 8.3 G/DL (ref 12–16)
IMMATURE GRANULOCYTES #: 0 K/UL
LYMPHOCYTES ABSOLUTE: 0.7 K/UL (ref 1.1–4.5)
LYMPHOCYTES RELATIVE PERCENT: 10.6 % (ref 20–40)
MAGNESIUM: 2.3 MG/DL (ref 1.6–2.6)
MCH RBC QN AUTO: 29.3 PG (ref 27–31)
MCHC RBC AUTO-ENTMCNC: 30.5 G/DL (ref 33–37)
MCV RBC AUTO: 96.1 FL (ref 81–99)
MONOCYTES ABSOLUTE: 0.6 K/UL (ref 0–0.9)
MONOCYTES RELATIVE PERCENT: 10 % (ref 0–10)
NEUTROPHILS ABSOLUTE: 4.8 K/UL (ref 1.5–7.5)
NEUTROPHILS RELATIVE PERCENT: 75.4 % (ref 50–65)
ORGANISM: ABNORMAL
ORGANISM: ABNORMAL
PDW BLD-RTO: 16.1 % (ref 11.5–14.5)
PERFORMED ON: ABNORMAL
PHOSPHORUS: 4.1 MG/DL (ref 2.5–4.5)
PLATELET # BLD: 312 K/UL (ref 130–400)
PMV BLD AUTO: 10.1 FL (ref 9.4–12.3)
POTASSIUM SERPL-SCNC: 4.3 MMOL/L (ref 3.5–5)
RBC # BLD: 2.83 M/UL (ref 4.2–5.4)
SODIUM BLD-SCNC: 135 MMOL/L (ref 136–145)
TOTAL PROTEIN: 5.9 G/DL (ref 6.6–8.7)
WBC # BLD: 6.3 K/UL (ref 4.8–10.8)

## 2022-05-24 PROCEDURE — 2700000000 HC OXYGEN THERAPY PER DAY

## 2022-05-24 PROCEDURE — 6360000002 HC RX W HCPCS: Performed by: SURGERY

## 2022-05-24 PROCEDURE — 6370000000 HC RX 637 (ALT 250 FOR IP): Performed by: SURGERY

## 2022-05-24 PROCEDURE — 83735 ASSAY OF MAGNESIUM: CPT

## 2022-05-24 PROCEDURE — 99231 SBSQ HOSP IP/OBS SF/LOW 25: CPT

## 2022-05-24 PROCEDURE — 2580000003 HC RX 258: Performed by: SURGERY

## 2022-05-24 PROCEDURE — 99232 SBSQ HOSP IP/OBS MODERATE 35: CPT | Performed by: PSYCHIATRY & NEUROLOGY

## 2022-05-24 PROCEDURE — 85025 COMPLETE CBC W/AUTO DIFF WBC: CPT

## 2022-05-24 PROCEDURE — 94640 AIRWAY INHALATION TREATMENT: CPT

## 2022-05-24 PROCEDURE — 2500000003 HC RX 250 WO HCPCS: Performed by: SURGERY

## 2022-05-24 PROCEDURE — 6370000000 HC RX 637 (ALT 250 FOR IP): Performed by: INTERNAL MEDICINE

## 2022-05-24 PROCEDURE — 2000000000 HC ICU R&B

## 2022-05-24 PROCEDURE — 82947 ASSAY GLUCOSE BLOOD QUANT: CPT

## 2022-05-24 PROCEDURE — 2580000003 HC RX 258: Performed by: ANESTHESIOLOGY

## 2022-05-24 PROCEDURE — 6370000000 HC RX 637 (ALT 250 FOR IP): Performed by: PSYCHIATRY & NEUROLOGY

## 2022-05-24 PROCEDURE — 84100 ASSAY OF PHOSPHORUS: CPT

## 2022-05-24 PROCEDURE — 80053 COMPREHEN METABOLIC PANEL: CPT

## 2022-05-24 PROCEDURE — 36415 COLL VENOUS BLD VENIPUNCTURE: CPT

## 2022-05-24 RX ADMIN — INSULIN LISPRO 3 UNITS: 100 INJECTION, SOLUTION INTRAVENOUS; SUBCUTANEOUS at 16:19

## 2022-05-24 RX ADMIN — IPRATROPIUM BROMIDE AND ALBUTEROL SULFATE 1 AMPULE: 2.5; .5 SOLUTION RESPIRATORY (INHALATION) at 06:09

## 2022-05-24 RX ADMIN — HYDRALAZINE HYDROCHLORIDE 100 MG: 50 TABLET, FILM COATED ORAL at 05:04

## 2022-05-24 RX ADMIN — CHLORHEXIDINE GLUCONATE 15 ML: 1.2 RINSE ORAL at 19:17

## 2022-05-24 RX ADMIN — SEVELAMER CARBONATE 0.8 G: 800 POWDER, FOR SUSPENSION ORAL at 16:23

## 2022-05-24 RX ADMIN — HYDRALAZINE HYDROCHLORIDE 100 MG: 50 TABLET, FILM COATED ORAL at 20:04

## 2022-05-24 RX ADMIN — IPRATROPIUM BROMIDE AND ALBUTEROL SULFATE 1 AMPULE: 2.5; .5 SOLUTION RESPIRATORY (INHALATION) at 10:42

## 2022-05-24 RX ADMIN — IPRATROPIUM BROMIDE AND ALBUTEROL SULFATE 1 AMPULE: 2.5; .5 SOLUTION RESPIRATORY (INHALATION) at 14:43

## 2022-05-24 RX ADMIN — INSULIN LISPRO 2 UNITS: 100 INJECTION, SOLUTION INTRAVENOUS; SUBCUTANEOUS at 20:02

## 2022-05-24 RX ADMIN — HYDRALAZINE HYDROCHLORIDE 100 MG: 50 TABLET, FILM COATED ORAL at 15:00

## 2022-05-24 RX ADMIN — HYDRALAZINE HYDROCHLORIDE 10 MG: 20 INJECTION INTRAMUSCULAR; INTRAVENOUS at 00:05

## 2022-05-24 RX ADMIN — DULOXETINE 60 MG: 60 CAPSULE, DELAYED RELEASE ORAL at 08:43

## 2022-05-24 RX ADMIN — NIFEDIPINE 90 MG: 90 TABLET, FILM COATED, EXTENDED RELEASE ORAL at 08:43

## 2022-05-24 RX ADMIN — CHLORHEXIDINE GLUCONATE 15 ML: 1.2 RINSE ORAL at 08:45

## 2022-05-24 RX ADMIN — Medication 10 ML: at 19:14

## 2022-05-24 RX ADMIN — LISINOPRIL 20 MG: 20 TABLET ORAL at 19:13

## 2022-05-24 RX ADMIN — BUSPIRONE HYDROCHLORIDE 5 MG: 5 TABLET ORAL at 08:44

## 2022-05-24 RX ADMIN — ISOSORBIDE MONONITRATE 120 MG: 60 TABLET, EXTENDED RELEASE ORAL at 08:43

## 2022-05-24 RX ADMIN — ACETYLCYSTEINE 600 MG: 200 SOLUTION ORAL; RESPIRATORY (INHALATION) at 06:09

## 2022-05-24 RX ADMIN — INSULIN LISPRO 3 UNITS: 100 INJECTION, SOLUTION INTRAVENOUS; SUBCUTANEOUS at 08:44

## 2022-05-24 RX ADMIN — ATORVASTATIN CALCIUM 20 MG: 20 TABLET, FILM COATED ORAL at 08:43

## 2022-05-24 RX ADMIN — HEPARIN SODIUM 5000 UNITS: 5000 INJECTION INTRAVENOUS; SUBCUTANEOUS at 14:30

## 2022-05-24 RX ADMIN — HEPARIN SODIUM 5000 UNITS: 5000 INJECTION INTRAVENOUS; SUBCUTANEOUS at 08:43

## 2022-05-24 RX ADMIN — ASPIRIN 81 MG 81 MG: 81 TABLET ORAL at 08:43

## 2022-05-24 RX ADMIN — BUSPIRONE HYDROCHLORIDE 5 MG: 5 TABLET ORAL at 19:14

## 2022-05-24 RX ADMIN — LEVETIRACETAM 500 MG: 500 SOLUTION ORAL at 08:43

## 2022-05-24 RX ADMIN — ACETYLCYSTEINE 600 MG: 200 SOLUTION ORAL; RESPIRATORY (INHALATION) at 18:25

## 2022-05-24 RX ADMIN — ANORECTAL OINTMENT: 15.7; .44; 24; 20.6 OINTMENT TOPICAL at 05:04

## 2022-05-24 RX ADMIN — SEVELAMER CARBONATE 0.8 G: 800 POWDER, FOR SUSPENSION ORAL at 08:43

## 2022-05-24 RX ADMIN — ROPINIROLE HYDROCHLORIDE 4 MG: 4 TABLET, FILM COATED ORAL at 19:13

## 2022-05-24 RX ADMIN — COLLAGENASE SANTYL: 250 OINTMENT TOPICAL at 08:45

## 2022-05-24 RX ADMIN — HEPARIN SODIUM 5000 UNITS: 5000 INJECTION INTRAVENOUS; SUBCUTANEOUS at 19:14

## 2022-05-24 RX ADMIN — SEVELAMER CARBONATE 0.8 G: 800 POWDER, FOR SUSPENSION ORAL at 12:30

## 2022-05-24 RX ADMIN — Medication 10 ML: at 08:45

## 2022-05-24 RX ADMIN — METOPROLOL SUCCINATE 100 MG: 50 TABLET, EXTENDED RELEASE ORAL at 08:43

## 2022-05-24 RX ADMIN — INSULIN GLARGINE 8 UNITS: 100 INJECTION, SOLUTION SUBCUTANEOUS at 20:03

## 2022-05-24 RX ADMIN — LISINOPRIL 20 MG: 20 TABLET ORAL at 08:43

## 2022-05-24 RX ADMIN — SODIUM CHLORIDE, PRESERVATIVE FREE 20 MG: 5 INJECTION INTRAVENOUS at 08:43

## 2022-05-24 RX ADMIN — IPRATROPIUM BROMIDE AND ALBUTEROL SULFATE 1 AMPULE: 2.5; .5 SOLUTION RESPIRATORY (INHALATION) at 01:55

## 2022-05-24 RX ADMIN — LEVETIRACETAM 500 MG: 500 SOLUTION ORAL at 19:13

## 2022-05-24 RX ADMIN — BUSPIRONE HYDROCHLORIDE 5 MG: 5 TABLET ORAL at 13:11

## 2022-05-24 RX ADMIN — LEVOTHYROXINE SODIUM 150 MCG: 75 TABLET ORAL at 05:04

## 2022-05-24 RX ADMIN — IPRATROPIUM BROMIDE AND ALBUTEROL SULFATE 1 AMPULE: 2.5; .5 SOLUTION RESPIRATORY (INHALATION) at 18:24

## 2022-05-24 RX ADMIN — ANORECTAL OINTMENT: 15.7; .44; 24; 20.6 OINTMENT TOPICAL at 19:03

## 2022-05-24 RX ADMIN — IPRATROPIUM BROMIDE AND ALBUTEROL SULFATE 1 AMPULE: 2.5; .5 SOLUTION RESPIRATORY (INHALATION) at 21:27

## 2022-05-24 ASSESSMENT — PAIN SCALES - GENERAL: PAINLEVEL_OUTOF10: 0

## 2022-05-24 ASSESSMENT — PAIN DESCRIPTION - LOCATION: LOCATION: OTHER (COMMENT)

## 2022-05-24 NOTE — PROGRESS NOTES
[facility] Progress Note      Patient:   Dillan Baez  MR#:    [id number]   Room:    [id number]   YOB: 1970  Date of Progress Note: 5/24/2022  Time of Note                           9:17 AM  Consulting Physician:  Donna Shahid DO  Attending Physician:  Lou Sanchez MD      INTERVAL HISTORY:  No acute events, remains more alert, tracking, but still not following commands, largely unchanged overnight. REVIEW OF SYSTEMS:  Unable to obtain     PHYSICAL EXAM:    Constitutional    BP (!) 163/69   Pulse 91   Temp 98 °F (36.7 °C) (Temporal)   Resp (!) 31   Ht 5' 6\" (1.676 m)   Wt 141 lb 2 oz (64 kg)   SpO2 93%   BMI 22.78 kg/m²   General appearance: Intubated, sedated   EYES -   Conjunctiva normal  Pupillary exam as below, see CN exam in the neurologic exam  ENT-    No scars, masses, or lesions over external nose or ears  Oropharynx - intubated  Cardiovascular -   No clubbing, cyanosis  Pulmonary-   Mechanically ventilated   Musculoskeletal    No significant wasting of muscles noted  Gait as below, see gait exam in the neurologic exam  Muscle strength, tone, stability as below see the motor exam in the neurologic exam.   No bony deformities  Skin    Warm, dry, and intact to inspection and palpation. No rash, erythema, or pallor        NEUROLOGICAL EXAM     Mental status    []? Awake, alert, oriented   []? Affect attention and concentration appear appropriate  []? Recent and remote memory appears unremarkable  []? Speech normal without dysarthria or aphasia, comprehension and repetition intact. COMMENTS:  Awake, not following commands, does appear to localize to pain at times and will track at times, right sided neglect. Cranial Nerves []? No VF deficit to confrontation,  optic discs normal, no papilledema on fundoscopic exam.  []? PERRLA, EOMI, no nystagmus, conjugate eye movements, no ptosis  []? Face symmetric  []? Facial sensation intact  []?  Tongue midline no atrophy or fasciculations present  []? Palate midline, hearing to finger rub normal  []? Shoulder shrug and SCM testing normal  COMMENTS:  PERRL, Face appears sym    Motor   []? 5/5 strength x 4 extremities  [x]? Normal bulk and tone  [x]? No tremor present  []? No rigidity or bradykinesia noted  COMMENTS: Withdrawal Right side   Sensory  []? Sensation intact to light touch, pin prick, vibration, and proprioception BLE  []? Sensation intact to light touch, pin prick, vibration, and proprioception BUE  COMMENTS: Limited, left sided neglect noted     Coordination []? FTN normal bilaterally   []? HTS normal bilaterally  []? SANFORD normal.   COMMENTS: Limited    Reflexes  [x]? Symmetric and non-pathological  [x]? Toes downgoing bilaterally  [x]? No clonus present  COMMENTS:   Gait                  []? Normal steady gait    []? Ataxic    []? Spastic     []? Magnetic     []? Shuffling  [x]? Not assessed  COMMENTS:        LABS/IMAGING:    CT Head WO Contrast    Result Date: 4/24/2022  CT HEAD WO CONTRAST 4/24/2022 2:01 PM HISTORY: Altered mental status COMPARISON: CT scan dated 11/18/2021 DOSE LENGTH PRODUCT: 766 mGy cm TECHNIQUE: Helical tomographic images of the brain were obtained without the use of intravenous contrast. Automated exposure control was also utilized to decrease patient radiation dose. FINDINGS: There is no evidence of evolving large vascular territory infarct. No visualized intra-axial or extra-axial hemorrhage. No mass lesion is identified. Normal size and configuration of the ventricular system. The basal cisterns are symmetric. Posterior fossa structures are unremarkable. The included orbits and their contents are unremarkable. Chronic paranasal sinus disease on the left. The visualized osseous structures and overlying soft tissues of the skull and face are unremarkable. 1. No acute intracranial process.  Signed by Dr Max Dia    XR CHEST PORTABLE    Result Date: 4/24/2022  XR CHEST PORTABLE 4/24/2022 4:27 PM HISTORY: ET tube, enteric tube  Technique: Single AP view of the chest COMPARISONS: 4/24/2022 FINDINGS: Endotracheal tube is identified with tip essentially touching the krysta. Enteric tube courses into the stomach with tip curled in the fundus back towards the gastroesophageal junction. The lungs are clear and well expanded. Heart size is stable. Pulmonary vasculature are nondilated. No pleural effusion or pneumothorax. 1. Endotracheal tube is deep, tip essentially touching the krysta. Lungs are clear and well expanded. I would recommend 2-3 cm withdrawal for a more optimal positioning. The results of this exam were discussed with Latonya Lechuga (ICU nursing) on 4/24/2022 at 1640 hours Signed by Dr Melvina Lomeli    XR CHEST PORTABLE    Result Date: 4/24/2022  XR CHEST PORTABLE 4/24/2022 1:17 PM HISTORY: ET tube placement  Technique: Single AP view of the chest COMPARISONS: 4/24/2022 FINDINGS: Status post endotracheal intubation. ET tube is in good position. Lungs are well-expanded. Enteric tube curls on itself in the midesophagus and then extends back up into the throat. Heart size is stable. Pulmonary vasculature are nondilated. 1. ET tube is in good position. Lungs are clear and well expanded. 2. Enteric tube curls on itself in the mid esophagus before extending back up into the throat. This will need repositioned. The results of this exam were discussed with Dr. JUÁREZ [facility] on 4/24/2022 at 1329 hours Signed by Dr Melvina Lomeli    XR CHEST PORTABLE    Result Date: 4/24/2022  XR CHEST PORTABLE 4/24/2022 12:34 PM HISTORY: Altered mental status  Technique: Single AP view of the chest COMPARISONS: Chest exam dated 4/1/2022 FINDINGS: No lung consolidation. No pleural effusion or pneumothorax. Cardiomediastinal silhouette and pulmonary vasculature are unremarkable. No acute bony abnormality. Left subclavian region vascular stent with additional stent projecting over the left humerus.     No acute cardiopulmonary process. Signed by Dr Edis Nguyen      Lab Results   Component Value Date    WBC 6.3 05/24/2022    HGB 8.3 (L) 05/24/2022    HCT 27.2 (L) 05/24/2022    MCV 96.1 05/24/2022     05/24/2022     Lab Results   Component Value Date     (L) 05/24/2022    K 4.3 05/24/2022    CL 91 (L) 05/24/2022    CO2 28 05/24/2022    BUN 37 (H) 05/24/2022    CREATININE 1.9 (H) 05/24/2022    GLUCOSE 227 (H) 05/24/2022    CALCIUM 8.9 05/24/2022    PROT 5.9 (L) 05/24/2022    LABALBU 2.8 (L) 05/24/2022    BILITOT 0.3 05/24/2022    ALKPHOS 120 (H) 05/24/2022     (H) 05/24/2022    ALT 70 (H) 05/24/2022    LABGLOM 28 (A) 05/24/2022    GFRAA 33 (L) 05/24/2022     Lab Results   Component Value Date    INR 1.13 05/20/2022    INR 1.13 05/19/2022    INR 1.09 05/18/2022    PROTIME 14.5 05/20/2022    PROTIME 14.5 05/19/2022    PROTIME 14.1 05/18/2022       RECORD REVIEW:   Previous medical records, medications were reviewed at today's visit. Nursing/physician notes, imaging, labs and vitals reviewed. ASSESSMENT:  47 y.o. admitted after an episode of unresponsiveness followed by generalized tonic-clonic event in the emergency department. She had profoundly elevated glucose, hyponatremia, underlying UTI,  end-stage renal disease with a history of non-compliance noted. MRI with large scattered left MCA distribution stroke. ECHO negative from a cardioembolic standpoint. Trach and PEG in place. Exam remains largely unchanged overnight, right sided weakness, likely global aphasia, and right stewart-neglect noted, doing about the same, remains off sedation. No clinical seizure activity noted overnight, largely unchanged.       PLAN:  1. Continue ASA, supportive care. 2.  Continue Keppra  3. Continue addressing respiratory failure, electrolyte and infectious issues, ESRD nephrology following  4. Limit sedating medications   5. Palliative care following      Please feel free to call with any questions. [phone] (cell phone).       Evelina Eldridge DO  Board Certified Neurology

## 2022-05-24 NOTE — PROGRESS NOTES
When we went to clean her per area, we found  rectal tube tube was  out. The other nurse helped me to insert it . mepelex changed on botton. Wound looks good. Wound care done per doc order. Will continue to monitor.

## 2022-05-24 NOTE — PROGRESS NOTES
Hospitalist Progress Note  [facility]     Patient: Claudia Nassar  : 1970  MRN: [id number]  Code Status: Full Code    Hospital Day: 30   Date of Service: 2022    Subjective:   Patient seen and examined. Trached.     Past Medical History:   Diagnosis Date    Acute ischemic stroke ([facility] 2022    Arthritis     Chronic kidney disease, stage 5, kidney failure (HCC)     Closed fracture of right shoulder girdle, with routine healing, subsequent encounter     DM (diabetes mellitus) type II controlled with renal manifestation ([facility] 1993    Gastroparesis     GERD (gastroesophageal reflux disease)     Hemodialysis patient ([facility]     dialysis on tues, thur, and sat at [facility]    Hypertension     Hypothyroid     Nasal congestion     recent    Neuropathy     Noncompliance with medications     Palliative care patient 10/08/2019    Restless leg syndrome        Past Surgical History:   Procedure Laterality Date    BREAST SURGERY Left     infected milk gland removed    BREAST SURGERY Right 2021    WEDGE EXCISION RIGHT BREAST DUCT WITH LACRIMAL DUCT PROBE, PEC BLOCK performed by June Gaviria MD at [facility], LAPAROSCOPIC          COLONOSCOPY Left 2020    Dr Stephanie Velazquez hepatic flexure-Severe tortuosity with severe spasming, 1 yr recall    DIALYSIS FISTULA CREATION Left 2019    REVISION LEFT UPPER EXTREMITY AV ACCESS WITH LEFT BRACHIAL ARTERY TO LEFT AXILLARY VEIN WITH  INTERPOSITIONAL ARTEGRAFT   performed by Julius Booker MD at [facility]      GASTROSTOMY TUBE PLACEMENT N/A 5/10/2022    PERCUTANEOUS ENDOSCOPIC GASTROSTOMY TUBE PLACEMENT, performed by Ebonie Darling MD at [address] Right 2019    INSERTION OF RIGHT INTERNAL JUGULAR HEMODIALYSIS CATHETER performed by Julius Booker MD at [address] [facility]  2018    1.  Moderately increased stainable iron identified by special stain in Kupffer cells and occasional hepatocytes. Negative for evidence of significant portal or lobular inflammation. Negative for evidence of significant fibrosis    DC COLONOSCOPY W/BIOPSY SINGLE/MULTIPLE N/A 10/20/2017    Dr Rita Vinson prep-Tubular AP (-) dysplasia x 2--3 yr recall    DC EGD TRANSORAL BIOPSY SINGLE/MULTIPLE N/A 2016    Dr Leslie Lincoln EGD TRANSORAL BIOPSY SINGLE/MULTIPLE N/A 10/20/2017    Dr Bang Rivera (-)    TRACHEOSTOMY  5/10/2022    TRACHEOSTOMY PERCUTANEOUS performed by Annalee Rodriguez MD at [address] VASCULAR SURGERY Left 2016    fistula by Dr Rebecca Teran at [address]  10/02/2017    [facility]. Left upper fistulograms/venograms, balloon angioplasty cephalic vein arch [id number] conquest.    VASCULAR SURGERY  2018    FISTULOGRAM    VASCULAR SURGERY  10/29/2018    [facility]. Left upper fistulograms/venograms    VASCULAR SURGERY  2018    [facility]. Arch aortogram,left upper arteriograms.  VASCULAR SURGERY  2019    [facility]. Removal of tunneled dialyis catheter right internal jugular vein.  VASCULAR SURGERY  2019    [facility]. Left upper extremity fistulogram including venography to the superior vena cava. Balloon angioplasty left subclavian vein with 10mm x 40mm conquest balloon. Balloon angioplasty mid/proximal upper arm cephalic vein with 64GY x 40mm conquest balloon. Venograms after balloon angioplasty. Stent left mid/proximal upper arm cephalic vein stenosis fluency 10mm x 60mm self-expanding covered stent. Balloon     VASCULAR SURGERY  2019    cont angioplasty stent with 10mm x 40mm conquest balloon. Completion venograms left upper extremity.        Family History   Problem Relation Age of Onset    Colon Cancer Mother          at 63    Colon Polyps Mother     Lung Cancer Father          at 66    Colon Polyps Father     Stomach Cancer Sister     Breast Cancer Sister 27    Depression Daughter 25        committed suicide    Liver Cancer Neg Hx     Liver Disease Neg Hx     Esophageal Cancer Neg Hx     Rectal Cancer Neg Hx        Social History     Socioeconomic History    Marital status: Single     Spouse name: Not on file    Number of children: 2    Years of education: Not on file    Highest education level: Not on file   Occupational History    Not on file   Tobacco Use    Smoking status: Current Every Day Smoker     Packs/day: 0.50     Years: 33.00     Pack years: 16.50     Types: Cigarettes    Smokeless tobacco: Never Used    Tobacco comment: NOW at 1/4 PD, started at age 24 and has averaged 2 PPD   Vaping Use    Vaping Use: Never used   Substance and Sexual Activity    Alcohol use: No    Drug use: Not Currently     Types: Marijuana Zayda Martin    Sexual activity: Not Currently     Partners: Male   Other Topics Concern    Not on file   Social History Narrative    Not on file     Social Determinants of Health     Financial Resource Strain:     Difficulty of Paying Living Expenses: Not on file   Food Insecurity:     Worried About Running Out of Food in the Last Year: Not on file    Kendy of Food in the Last Year: Not on file   Transportation Needs:     Lack of Transportation (Medical): Not on file    Lack of Transportation (Non-Medical):  Not on file   Physical Activity:     Days of Exercise per Week: Not on file    Minutes of Exercise per Session: Not on file   Stress:     Feeling of Stress : Not on file   Social Connections:     Frequency of Communication with Friends and Family: Not on file    Frequency of Social Gatherings with Friends and Family: Not on file    Attends Scientologist Services: Not on file    Active Member of Clubs or Organizations: Not on file    Attends Club or Organization Meetings: Not on file    Marital Status: Not on file   Intimate Partner Violence:     Fear of Current or Ex-Partner: Not on file    Emotionally Abused: Not on file    Physically Abused: Not on file   Isaac Finney Sexually Abused: Not on file   Housing Stability:     Unable to Pay for Housing in the Last Year: Not on file    Number of Places Lived in the Last Year: Not on file    Unstable Housing in the Last Year: Not on file       Current Facility-Administered Medications   Medication Dose Route Frequency Provider Last Rate Last Admin    lisinopril (PRINIVIL;ZESTRIL) tablet 20 mg  20 mg Per NG tube BID Deep Swan MD   20 mg at 05/24/22 0843    busPIRone (BUSPAR) tablet 5 mg  5 mg Oral TID Puneet Noyola MD   5 mg at 05/24/22 1311    LORazepam (ATIVAN) injection 0.5 mg  0.5 mg IntraVENous Q6H PRN Aayush Hay DO   0.5 mg at 05/22/22 0328    insulin glargine (LANTUS) injection vial 8 Units  8 Units SubCUTAneous Nightly Puneet Noyola MD   8 Units at 05/23/22 2044    sodium zirconium cyclosilicate (LOKELMA) oral suspension 10 g  10 g Oral TID PRN Deep Swan MD        sevelamer (RENVELA) packet 0.8 g  0.8 g Oral TID  Puneet Noyola MD   0.8 g at 05/24/22 0843    insulin lispro (HUMALOG) injection vial 0-6 Units  0-6 Units SubCUTAneous TID GIANNA Noyola MD   3 Units at 05/24/22 0844    insulin lispro (HUMALOG) injection vial 0-3 Units  0-3 Units SubCUTAneous Nightly Puneet Noyola MD   1 Units at 05/23/22 2044    levETIRAcetam (KEPPRA) 100 MG/ML solution 500 mg  500 mg Per G Tube BID Conrado Hernandez MD   500 mg at 05/24/22 2650    atorvastatin (LIPITOR) tablet 20 mg  20 mg Oral Daily Puneet Noyola MD   20 mg at 05/24/22 0843    rOPINIRole (REQUIP) tablet 4 mg  4 mg Oral Nightly Puneet Noyola MD   4 mg at 05/23/22 2030    NIFEdipine (PROCARDIA XL) extended release tablet 90 mg  90 mg Oral Daily Puneet Noyola MD   90 mg at 05/24/22 0843    metoprolol succinate (TOPROL XL) extended release tablet 100 mg  100 mg Oral QAM AC Puneet Noyola MD   100 mg at 05/24/22 3528    levothyroxine (SYNTHROID) tablet 150 mcg  150 mcg Oral Daily Puneet Noyola MD   150 mcg at 05/24/22 0504    isosorbide mononitrate (IMDUR) extended release tablet 120 mg  120 mg Oral Daily Annika Lama MD   120 mg at 05/24/22 0843    DULoxetine (CYMBALTA) extended release capsule 60 mg  60 mg Oral Daily Annika Lama MD   60 mg at 05/24/22 7975    collagenase ointment   Topical Daily Annika Lama MD   Given at 05/24/22 0845    sodium chloride flush 0.9 % injection 5-40 mL  5-40 mL IntraVENous 2 times per day Devang Narayan MD   10 mL at 05/24/22 0845    sodium chloride flush 0.9 % injection 5-40 mL  5-40 mL IntraVENous PRN Devang Narayan MD        0.9 % sodium chloride infusion   IntraVENous PRN Devang Narayan MD        vancom[facility] intermittent dosing (placeholder)   Other RX Poly Lima MD        aminoglycoside intermittent dosing (placeholder)   Other RX Placeholder Denilson Zaragoza MD        acetylcysteine (MUCOMYST) 20 % solution 600 mg  600 mg Inhalation BID Denilson Zaragoza MD   600 mg at 05/24/22 0228    ipratropium-albuterol (DUONEB) nebulizer solution 1 ampule  1 ampule Inhalation Q4H Denilson Zaragoza MD   1 ampule at 05/24/22 1443    hydrALAZINE (APRESOLINE) injection 10 mg  10 mg IntraVENous Q6H PRN Denilson Zaragoza MD   10 mg at 05/24/22 0005    aspirin chewable tablet 81 mg  81 mg Oral Daily Denilson Zaragoza MD   81 mg at 05/24/22 0843    racepinephrine HCl (VAPONEFPRIN) 2.25 % nebulizer solution NEBU 11.25 mg  11.25 mg Nebulization Q4H PRN Denilson Zaragoza MD   11.25 mg at 05/03/22 1408    sodium chloride nebulizer 0.9 % solution 3 mL  3 mL Nebulization Q4H PRN Denilson Zaragoza MD        heparin (porcine) injection 5,000 Units  5,000 Units SubCUTAneous TID Denilson Zaragoza MD   5,000 Units at 05/24/22 1430    Peppermint Spirit SPRT   Does not apply PRN Denilson Zaragoza MD        menthol-zinc oxide (CALMOSEPTINE) 0.44-20.6 % ointment   Topical BID Annika Lama MD   Given at 05/24/22 0504    sodium phosphate 10.5 mmol in sodium chloride 0.9 % 250 mL IVPB  0.16 mmol/kg IntraVENous PRN Reji Nolen MD        Or    sodium phosphate 21 mmol in sodium chloride 0.9 % 250 mL IVPB  0.32 mmol/kg IntraVENous PRN Reji Nolen MD        hydrALAZINE (APRESOLINE) tablet 100 mg  100 mg Oral 3 times per day Reji Nolen MD   100 mg at 05/24/22 0504    glucagon (rDNA) injection 1 mg  1 mg IntraMUSCular PRN Reji Nolen MD        dextrose 5 % solution  100 mL/hr IntraVENous PRN Reji Nolen MD        glucose chewable tablet 16 g  4 tablet Oral PRN Reji Nolen MD        dextrose bolus (hypoglycemia) 10% 125 mL  125 mL IntraVENous PRN Reji Nolen MD   Stopped at 05/11/22 2016    Or    dextrose bolus (hypoglycemia) 10% 250 mL  250 mL IntraVENous PRN Reji Nolen MD   Stopped at 05/10/22 2026    LORazepam (ATIVAN) injection 1 mg  1 mg IntraVENous Q5 Min PRN Reji Nolen MD        ondansetron (ZOFRAN-ODT) disintegrating tablet 4 mg  4 mg Oral Q8H PRN Reji Nolen MD        Or    ondansetron [facility]) injection 4 mg  4 mg IntraVENous Q6H PRN Reji Nolen MD        polyethylene glycol [facility]) packet 17 g  17 g Oral Daily PRN Reji Nolen MD        acetaminophen (TYLENOL) tablet 650 mg  650 mg Oral Q6H PRN Reji Nolen MD   650 mg at 05/21/22 1627    Or    acetaminophen (TYLENOL) suppository 650 mg  650 mg Rectal Q6H PRN Reji Nolen MD        chlorhexidine (PERIDEX) 0.12 % solution 15 mL  15 mL Mouth/Throat BID Reji Nolen MD   15 mL at 05/24/22 0845    famotidine (PEPCID) 20 mg in sodium chloride (PF) 10 mL injection  20 mg IntraVENous Daily Reji Nolen MD   20 mg at 05/24/22 3099    magic butt cream   Topical Q4H PRN Reji Nolen MD   Given at 05/03/22 0940    magnesium sulfate 1000 mg in dextrose 5% 100 mL IVPB  1,000 mg IntraVENous PRN Reji Nolen MD        potassium bicarb-citric acid (EFFER-K) effervescent tablet 40 mEq  40 mEq Oral PRN Reji Nolen MD        Or    potassium chloride 10 mEq/100 mL IVPB (Peripheral Line)  10 mEq IntraVENous PRN Rosa Elena Huang MD   Stopped at 04/25/22 6674         sodium chloride      dextrose          Objective:   BP (!) 143/65   Pulse 93   Temp 98.6 °F (37 °C) (Temporal)   Resp (!) 31   Ht 5' 6\" (1.676 m)   Wt 141 lb 2 oz (64 kg)   SpO2 90%   BMI 22.78 kg/m²     General: no acute distress  HEENT: normocephalic, atraumatic  Lungs: faint rhonchi  Cardiovascular: s1 and s2 normal  Abdomen: soft, positive bowel sounds  Extremities: no cyanosis   Neuro: trached    Recent Labs     05/22/22 0230 05/23/22  0134 05/24/22  0143   WBC 8.2 8.4 6.3   RBC 2.64* 2.82* 2.83*   HGB 7.6* 8.1* 8.3*   HCT 25.5* 26.9* 27.2*   MCV 96.6 95.4 96.1   MCH 28.8 28.7 29.3   MCHC 29.8* 30.1* 30.5*    258 312     Recent Labs     05/22/22 0230 05/23/22  0134 05/24/22  0143    132* 135*   K 4.9 5.2* 4.3   ANIONGAP 19 18 16   CL 91* 89* 91*   CO2 26 25 28   BUN 56* 74* 37*   CREATININE 2.6* 3.2* 1.9*   GLUCOSE 217* 144* 227*   CALCIUM 9.0 9.1 8.9     Recent Labs     05/22/22 0230 05/23/22  0134 05/24/22  0143   MG 2.5 2.5 2.3   PHOS 5.1* 6.1* 4.1     Recent Labs     05/22/22 0230 05/23/22  0134 05/24/22  0143   AST 11 10 139*   ALT 6 6 70*   BILITOT 0.3 0.3 0.3   ALKPHOS 116* 114* 120*     No results for input(s): PH, PO2, PCO2, HCO3, BE, O2SAT in the last 72 hours. No results for input(s): TROPONINI in the last 72 hours. No results for input(s): INR in the last 72 hours. No results for input(s): LACTA in the last 72 hours. Intake/Output Summary (Last 24 hours) at 5/24/2022 1617  Last data filed at 5/24/2022 1600  Gross per 24 hour   Intake 514 ml   Output 270 ml   Net 244 ml       No results found.      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 Gleason Councilman collar 6 L     ESRD on HD  Renal following     Medical noncompliance       C. difficile infection  Completed oral vancomycin course     Tobacco dependence       IDDM 2  Meds on board     DVT prophylaxis  Heparin     Dispo:  LTAC eval in progress    Total critical care time: 40 minutes    Hollie Boeck, MD   5/24/2022 4:17 PM

## 2022-05-24 NOTE — PROGRESS NOTES
Palliative Care Progress Note  5/24/2022 8:05 AM    Patient:  Maia Fraias  YOB: 1970  Primary Care Physician: ROMINA Denny  Advance Directive: Full Code  Admit Date: 4/24/2022       Hospital Day: 30  Portions of this note have been copied forward, however, changed to reflect the most current clinical status of this patient. CHIEF COMPLAINT/REASON FOR CONSULTATION Goals of care    SUBJECTIVE:  Ms. Zari Fermin remains unchanged overnight. Alert but not following commands. Trach collar trials continue. No s/s distress noted    Review of Systems:   14 point review of systems is negative except as specifically addressed above. Objective:   VITALS:  BP (!) 163/69   Pulse 91   Temp 98 °F (36.7 °C) (Temporal)   Resp (!) 31   Ht 5' 6\" (1.676 m)   Wt 141 lb 2 oz (64 kg)   SpO2 93%   BMI 22.78 kg/m²   24HR INTAKE/OUTPUT:      Intake/Output Summary (Last 24 hours) at 5/24/2022 0805  Last data filed at 5/24/2022 0511  Gross per 24 hour   Intake 888 ml   Output 240 ml   Net 648 ml     General appearance: 46 yo female, chronically ill appearing, trached, no acute distress  Head: Normocephalic, without obvious abnormality, atraumatic  Eyes: conjunctivae/corneas clear. PERRL  Ears: normal external ears and nose  Neck: supple, symmetrical, trachea midline, trach present  Lungs: Clear and diminished in bases bilaterally to ausculation, trach with trach collar in place, 6L   Heart: regular rate and rhythm, S1, S2 normal, no murmur  Abdomen: soft, no grimacing w/ palpation; non-distended, bowel sounds present.  FMS in place with small diarrhea noted   Extremities: trace edema in BUE, No lower extremity edema,  No erythema, no grimacing w/ palpation   Skin: warm, dry, pale  Neurologic: Trached and off sedation, alert, tracking at times, but still not following commands, does not withdrawal to pain but moves LUE spontaneously     Medications:      sodium chloride      dextrose        lisinopril  20 mg Per NG tube BID    busPIRone  5 mg Oral TID    insulin glargine  8 Units SubCUTAneous Nightly    sevelamer  0.8 g Oral TID WC    insulin lispro  0-6 Units SubCUTAneous TID WC    insulin lispro  0-3 Units SubCUTAneous Nightly    levETIRAcetam  500 mg Per G Tube BID    atorvastatin  20 mg Oral Daily    rOPINIRole  4 mg Oral Nightly    NIFEdipine  90 mg Oral Daily    metoprolol succinate  100 mg Oral QAM AC    levothyroxine  150 mcg Oral Daily    isosorbide mononitrate  120 mg Oral Daily    DULoxetine  60 mg Oral Daily    collagenase   Topical Daily    sodium chloride flush  5-40 mL IntraVENous 2 times per day    vancomycin (VANCOCIN) intermittent dosing (placeholder)   Other RX Placeholder    aminoglycoside intermittent dosing (placeholder)   Other RX Placeholder    acetylcysteine  600 mg Inhalation BID    ipratropium-albuterol  1 ampule Inhalation Q4H    aspirin  81 mg Oral Daily    heparin (porcine)  5,000 Units SubCUTAneous TID    menthol-zinc oxide   Topical BID    hydrALAZINE  100 mg Oral 3 times per day    chlorhexidine  15 mL Mouth/Throat BID    famotidine (PEPCID) injection  20 mg IntraVENous Daily     LORazepam, sodium zirconium cyclosilicate, sodium chloride flush, sodium chloride, hydrALAZINE, racepinephrine HCl, sodium chloride nebulizer, Peppermint Spirit, sodium phosphate IVPB **OR** sodium phosphate IVPB, glucagon (rDNA), dextrose, glucose, dextrose bolus **OR** dextrose bolus, LORazepam, ondansetron **OR** ondansetron, polyethylene glycol, acetaminophen **OR** acetaminophen, magic butt cream, magnesium sulfate, potassium alternative oral replacement **OR** potassium chloride  ADULT TUBE FEEDING; PEG; Peptide Based High Protein; Continuous; 20; Yes; 10; Q 6 hours; 48; 0; Other (specify); no free water flush     Lab and other Data:     Recent Labs     05/22/22  0230 05/23/22  0134 05/24/22  0143   WBC 8.2 8.4 6.3   HGB 7.6* 8.1* 8.3*    258 312     Recent Labs 05/22/22  0230 05/23/22  0134 05/24/22  0143    132* 135*   K 4.9 5.2* 4.3   CL 91* 89* 91*   CO2 26 25 28   BUN 56* 74* 37*   CREATININE 2.6* 3.2* 1.9*   GLUCOSE 217* 144* 227*     Recent Labs     05/22/22  0230 05/23/22  0134 05/24/22  0143   AST 11 10 139*   ALT 6 6 70*   BILITOT 0.3 0.3 0.3   ALKPHOS 116* 114* 120*       Assessment/Plan   Principal Problem:    Acute ischemic stroke (HCC)  Active Problems:    Weakness    Severe malnutrition (HCC)    Gastroesophageal reflux disease without esophagitis    Acquired hypothyroidism    Anemia in chronic kidney disease (CKD)    Type 2 diabetes mellitus with chronic kidney disease on chronic dialysis, with long-term current use of insulin (HCC)    Acute encephalopathy    Respiratory failure ([address]    Uncontrolled type 2 diabetes mellitus with complication, with long-term current use of insulin ([facility])    End stage renal disease (HCC)    PVD (peripheral vascular disease) ([facility])    ESRD on hemodialysis ([address]    Hyponatremia    Hyperglycemia due to type 2 diabetes mellitus ([address]    Uncontrolled hypertension    Seizure ([address]    Palliative care patient    Uncontrolled type 2 diabetes mellitus with hyperosmolar nonketotic hyperglycemia (HCC)    Altered mental state    Acute hypoxemic respiratory failure (HCC)    Generalized weakness    UTI (urinary tract infection)    Closed fracture of left distal femur (HCC)    History of infection with vancomycin resistant Enterococcus (VRE)  Resolved Problems:    * No resolved hospital problems. *      Visit Summary:  Chart reviewed and discussed patient with RN. No acute events overnight. Ms. Mary Jane Nichole is seen at bedside and is clinically the same. Trach collar trials continue and she remains off sedation. PRN ativan IVP available but has not needed since 5/22/22. SW continues to follow for [facility] referral and d/c planning. Will follow. Recommendations:   1. Palliative care: GOC continue all aggressive measures.  Sig other agreeable to LTACH transfer once approved. Code status: FULL CODE    2. Acute hypoxemic respiratory failure- Extubated 5/3/22 but required reintubation that afternoon. Original intubation date 4/24/22. Now with trach/PEG 5/10/22. Weaned to trach collar 6 L    3. Acute left MCA infarct- mgmt per neuro. MRI 4/29/22 noted. Echo negative 4/30/22. On ASA. Supportive care    4. Hyperglycemia w/ Hx of DM II-mgmt per Hospitalist, SSI    5. S/p tonic-clonic seizure-mgmt per Neurology, cEEG discontinued, Keppra 500 mg BID    6. ESRD on HD-Nephrology managing. Dialysis today    7. Hypertension-Hydralazine 100 mg TID, cardene gtt as needed    8. Clostridium difficile/Norovirus- Oral vancomycin completed 5/8/22    9. MRSA/Raoultella ornithinolytica/Proteus vulgaris respiratory positivity- empiric abx per hospitalist. 100.1  Temp 5/21/22. Repeat blood cultures and UA, NGTD. Repeat sputum culture 5/22/22 growth positive for Staph aureus MRSA and Proteus vulgaris    Thank you for consulting Palliative Care and allowing us to participate in the care of this patient.    Time Spent Counseling > 50%:  YES                                   Total Time Spent with patient/family counseling, workup/treatment review, counseling and placement of orders/preparation of this note: 19 minutes    Electronically signed by ROMINA Ascencio CNP on 5/24/2022 at 8:05 AM    (Please note that portions of this note were completed with a voice recognition program.  Katey Espinoza made to edit the dictations but occasionally words are mis-transcribed.)

## 2022-05-24 NOTE — PROGRESS NOTES
Pt has been refusing oral care. I tried several times but she is not opening her mouth. Will continue to monitor.

## 2022-05-24 NOTE — PROGRESS NOTES
Nephrology ([facility]) Progress Note    Patient:  Ventura Morris  YOB: 1970  Date of Service: 5/24/2022  MRN: [id number]   Acct: [de-identified]   Primary Care Physician: ROMINA Dorman  Advance Directive: Full Code  Admit Date: 4/24/2022       Hospital Day: 27  Referring Provider: Puneet Noyola MD    Patient independently seen and examined, Chart, Consults, Notes, Operative notes, Labs, Cardiology, and Radiology studies reviewed as available. Subjective:  Ventura Morris is a 47 y.o. female for whom we were consulted for evaluation and treatment of end-stage renal disease. Patient also has a history of seizure disorder, type 2 diabetes, hypertension, frequent hospitalization with noncompliance and poorly controlled diabetes. Patient was brought to the emergency room after she was found to have unresponsiveness and witnessed grand mal seizure. She was brought in by ambulance. On arrival in the emergency room, she was found to be severely hyperglycemic and had a urinary tract infection. Patient was intubated, sedated and treated by neurology for grand mal seizure. She was also receiving IV antibiotics and insulin drip. Her blood sugar control had significantly improved. On April 25, she has received emergent hemodialysis treatment for correction of volume status as well as hyponatremia. Today, patient remains unable to participate in the history and physical examination. Events reviewed with nursing at the bedside. No family present. No issues with dialysis yesterday. Using trach collar.           Allergies:  Penicillins, Lamisil advanced [terbinafine], Reglan [metoclopramide], and Stadol [butorphanol]    Medicines:  Current Facility-Administered Medications   Medication Dose Route Frequency Provider Last Rate Last Admin    lisinopril (PRINIVIL;ZESTRIL) tablet 20 mg  20 mg Per NG tube BID Deep Swan MD   20 mg at 05/24/22 0843    busPIRone (BUSPAR) tablet 5 mg  5 mg Oral TID Jesús Powell MD   5 mg at 05/24/22 0844    LORazepam (ATIVAN) injection 0.5 mg  0.5 mg IntraVENous Q6H PRN Hussein Mathews DO   0.5 mg at 05/22/22 0328    insulin glargine (LANTUS) injection vial 8 Units  8 Units SubCUTAneous Nightly Jesús Powell MD   8 Units at 05/23/22 2044    sodium zirconium cyclosilicate (LOKELMA) oral suspension 10 g  10 g Oral TID PRN Jared Gotti MD        sevelamer (RENVELA) packet 0.8 g  0.8 g Oral TID  Jesús Powell MD   0.8 g at 05/24/22 0843    insulin lispro (HUMALOG) injection vial 0-6 Units  0-6 Units SubCUTAneous TID  Jesús Powell MD   3 Units at 05/24/22 0844    insulin lispro (HUMALOG) injection vial 0-3 Units  0-3 Units SubCUTAneous Nightly Jesús Powell MD   1 Units at 05/23/22 2044    levETIRAcetam (KEPPRA) 100 MG/ML solution 500 mg  500 mg Per G Tube BID Sourav Spicer MD   500 mg at 05/24/22 3692    atorvastatin (LIPITOR) tablet 20 mg  20 mg Oral Daily Jesús Powell MD   20 mg at 05/24/22 0843    rOPINIRole (REQUIP) tablet 4 mg  4 mg Oral Nightly Jesús Powell MD   4 mg at 05/23/22 2030    NIFEdipine (PROCARDIA XL) extended release tablet 90 mg  90 mg Oral Daily Jesús Powell MD   90 mg at 05/24/22 0843    metoprolol succinate (TOPROL XL) extended release tablet 100 mg  100 mg Oral QAM AC Jesús Powell MD   100 mg at 05/24/22 9739    levothyroxine (SYNTHROID) tablet 150 mcg  150 mcg Oral Daily Jesús Powell MD   150 mcg at 05/24/22 0504    isosorbide mononitrate (IMDUR) extended release tablet 120 mg  120 mg Oral Daily Jesús Powell MD   120 mg at 05/24/22 0843    DULoxetine (CYMBALTA) extended release capsule 60 mg  60 mg Oral Daily Jesús Powell MD   60 mg at 05/24/22 0557    collagenase ointment   Topical Daily Jesús Powell MD   Given at 05/24/22 0845    sodium chloride flush 0.9 % injection 5-40 mL  5-40 mL IntraVENous 2 times per day Stephan Phipps MD   10 mL at 05/24/22 0845    sodium chloride flush 0.9 % injection 5-40 mL  5-40 mL IntraVENous PRN Ying Fields MD        0.9 % sodium chloride infusion   IntraVENous PRN Ying Fields MD        vancomycin [facility]) intermittent dosing (placeholder)   Other RX Margret Rosado MD        aminoglycoside intermittent dosing (placeholder)   Other RX Margret Rosado MD        acetylcysteine (MUCOMYST) 20 % solution 600 mg  600 mg Inhalation BID Kurtis Day MD   600 mg at 05/24/22 9509    ipratropium-albuterol (DUONEB) nebulizer solution 1 ampule  1 ampule Inhalation Q4H Kurtis Day MD   1 ampule at 05/24/22 1042    hydrALAZINE (APRESOLINE) injection 10 mg  10 mg IntraVENous Q6H PRN Kurtis Day MD   10 mg at 05/24/22 0005    aspirin chewable tablet 81 mg  81 mg Oral Daily Kurtis Day MD   81 mg at 05/24/22 0843    racepinephrine HCl (VAPONEFPRIN) 2.25 % nebulizer solution NEBU 11.25 mg  11.25 mg Nebulization Q4H PRN Kurtis Day MD   11.25 mg at 05/03/22 1408    sodium chloride nebulizer 0.9 % solution 3 mL  3 mL Nebulization Q4H PRN Kurtis Day MD        heparin (porcine) injection 5,000 Units  5,000 Units SubCUTAneous TID Kurtis Day MD   5,000 Units at 05/24/22 3419    Peppermint Spirit SPRT   Does not apply PRN Kurtis Dya MD        menthol-zinc oxide (CALMOSEPTINE) 0.44-20.6 % ointment   Topical BID Kay Singh MD   Given at 05/24/22 0504    sodium phosphate 10.5 mmol in sodium chloride 0.9 % 250 mL IVPB  0.16 mmol/kg IntraVENous PRN Kurtis Day MD        Or    sodium phosphate 21 mmol in sodium chloride 0.9 % 250 mL IVPB  0.32 mmol/kg IntraVENous PRN Kurtis Day MD        hydrALAZINE (APRESOLINE) tablet 100 mg  100 mg Oral 3 times per day Kurtis Day MD   100 mg at 05/24/22 0504    glucagon (rDNA) injection 1 mg  1 mg IntraMUSCular PRN Kurtis Day MD        dextrose 5 % solution  100 mL/hr IntraVENous PRN Kurtis Day MD        glucose chewable tablet 16 g  4 tablet Oral PRDAVI Matute MD        dextrose bolus (hypoglycemia) 10% 125 mL  125 mL IntraVENous PRN Shauna Matute MD   Stopped at 05/11/22 2016    Or    dextrose bolus (hypoglycemia) 10% 250 mL  250 mL IntraVENous PRN Shauna Matute MD   Stopped at 05/10/22 2026    LORazepam (ATIVAN) injection 1 mg  1 mg IntraVENous Q5 Min PRN Shauna Matute MD        ondansetron (ZOFRAN-ODT) disintegrating tablet 4 mg  4 mg Oral Q8H PRN Shauna Matute MD        Or    ondansetron [facility]) injection 4 mg  4 mg IntraVENous Q6H PRN Shauna Matute MD        polyethylene glycol [facility]) packet 17 g  17 g Oral Daily PRN Shauna Matute MD        acetaminophen (TYLENOL) tablet 650 mg  650 mg Oral Q6H PRN Shauna Matute MD   650 mg at 05/21/22 1627    Or    acetaminophen (TYLENOL) suppository 650 mg  650 mg Rectal Q6H PRN Shauna Matute MD        chlorhexidine (PERIDEX) 0.12 % solution 15 mL  15 mL Mouth/Throat BID Shauna Matute MD   15 mL at 05/24/22 0845    famotidine (PEPCID) 20 mg in sodium chloride (PF) 10 mL injection  20 mg IntraVENous Daily Shauna Matute MD   20 mg at 05/24/22 3580    magic butt cream   Topical Q4H PRN Shauna Matute MD   Given at 05/03/22 0940    magnesium sulfate 1000 mg in dextrose 5% 100 mL IVPB  1,000 mg IntraVENous PRN Shauna Matute MD        potassium bicarb-citric acid (EFFER-K) effervescent tablet 40 mEq  40 mEq Oral PRN Shauna Matute MD        Or    potassium chloride 10 mEq/100 mL IVPB (Peripheral Line)  10 mEq IntraVENous PRN Shauna Matute MD   Stopped at 04/25/22 2449       Past Medical History:  Past Medical History:   Diagnosis Date    Acute ischemic stroke ([facility] 4/30/2022    Arthritis     Chronic kidney disease, stage 5, kidney failure ([address]     Closed fracture of right shoulder girdle, with routine healing, subsequent encounter     DM (diabetes mellitus) type II controlled with renal manifestation ([facility] 06/1993    Gastroparesis     GERD (gastroesophageal reflux disease)     Hemodialysis patient ([facility]     dialysis on tues, thur, and sat at [facility] Hypertension     Hypothyroid     Nasal congestion     recent    Neuropathy     Noncompliance with medications     Palliative care patient 10/08/2019    Restless leg syndrome        Past Surgical History:  Past Surgical History:   Procedure Laterality Date    BREAST SURGERY Left 2008    infected milk gland removed    BREAST SURGERY Right 5/25/2021    WEDGE EXCISION RIGHT BREAST DUCT WITH LACRIMAL DUCT PROBE, PEC BLOCK performed by Johny Lucio MD at [address] [place] [address]      2008    COLONOSCOPY Left 9/17/2020    Dr Jodie Vicente hepatic flexure-Severe tortuosity with severe spasming, 1 yr recall    DIALYSIS FISTULA CREATION Left 1/25/2019    REVISION LEFT UPPER EXTREMITY AV ACCESS WITH LEFT BRACHIAL ARTERY TO LEFT AXILLARY VEIN WITH  INTERPOSITIONAL ARTEGRAFT   performed by Amor Radford MD at [address] [address]  2012    GASTROSTOMY TUBE PLACEMENT N/A 5/10/2022    PERCUTANEOUS ENDOSCOPIC GASTROSTOMY TUBE PLACEMENT, performed by Emily Wallace [facility] at [facility] [place] [address] Right 1/25/2019    INSERTION OF RIGHT INTERNAL JUGULAR HEMODIALYSIS CATHETER performed by Amor Radford MD at [address] [place] [address]  08/17/2018    1.  Moderately increased stainable iron identified by special stain in Kupffer cells and occasional hepatocytes. Negative for evidence of significant portal or lobular inflammation.  Negative for evidence of significant fibrosis    [place] COLONOSCOPY W/BIOPSY SINGLE/MULTIPLE N/A 10/20/2017    Dr Shahla Silva prep-Tubular AP (-) dysplasia x 2--3 yr recall    [place] EGD TRANSORAL BIOPSY SINGLE/MULTIPLE N/A 12/27/2016    Dr Ellen Benson EGD TRANSORAL BIOPSY SINGLE/MULTIPLE N/A 10/20/2017    Dr Sara Dooley (-)    TRACHEOSTOMY  5/10/2022    TRACHEOSTOMY PERCUTANEOUS performed by Emily Wallace MD at [place] [address] SURGERY Left 2016    fistula by Dr Maddiosn Rolon at [address]  10/02/2017    [facility]. Left upper fistulograms/venograms, balloon angioplasty cephalic vein arch 00H03 conquest.    VASCULAR SURGERY  2018    FISTULOGRAM    VASCULAR SURGERY  10/29/2018    [facility]. Left upper fistulograms/venograms    VASCULAR SURGERY  2018    [facility]. Arch aortogram,left upper arteriograms.  VASCULAR SURGERY  2019    [facility]JS. Removal of tunneled dialyis catheter right internal jugular vein.  VASCULAR SURGERY  2019    [facility]. Left upper extremity fistulogram including venography to the superior vena cava. Balloon angioplasty left subclavian vein with 10mm x 40mm conquest balloon. Balloon angioplasty mid/proximal upper arm cephalic vein with 53TP x 40mm conquest balloon. Venograms after balloon angioplasty. Stent left mid/proximal upper arm cephalic vein stenosis fluency 10mm x 60mm self-expanding covered stent. Balloon     VASCULAR SURGERY  2019    cont angioplasty stent with 10mm x 40mm conquest balloon. Completion venograms left upper extremity.        Family History  Family History   Problem Relation Age of Onset    Colon Cancer Mother          at 63    Colon Polyps Mother     Lung Cancer Father          at 79    Colon Polyps Father     Stomach Cancer Sister     Breast Cancer Sister 27    Depression Daughter 25        committed suicide    Liver Cancer Neg Hx     Liver Disease Neg Hx     Esophageal Cancer Neg Hx     Rectal Cancer Neg Hx        Social History  Social History     Socioeconomic History    Marital status: Single     Spouse name: Not on file    Number of children: 2    Years of education: Not on file    Highest education level: Not on file   Occupational History    Not on file   Tobacco Use    Smoking status: Current Every Day Smoker     Packs/day: 0.50     Years: 33.00     Pack years: 16.50     Types: Cigarettes    Smokeless tobacco: Never Used    Tobacco comment: NOW at  PD, started at age 24 and has averaged 2 PPD   Vaping Use    Vaping Use: Never used   Substance and Sexual Activity    Alcohol use: No    Drug use: Not Currently     Types: Marijuana Xiomara Baker)    Sexual activity: Not Currently     Partners: Male   Other Topics Concern    Not on file   Social History Narrative    Not on file     Social Determinants of Health     Financial Resource Strain:     Difficulty of Paying Living Expenses: Not on file   Food Insecurity:     Worried About Running Out of Food in the Last Year: Not on file    Kendy of Food in the Last Year: Not on file   Transportation Needs:     Lack of Transportation (Medical): Not on file    Lack of Transportation (Non-Medical):  Not on file   Physical Activity:     Days of Exercise per Week: Not on file    Minutes of Exercise per Session: Not on file   Stress:     Feeling of Stress : Not on file   Social Connections:     Frequency of Communication with Friends and Family: Not on file    Frequency of Social Gatherings with Friends and Family: Not on file    Attends Tenri[employer] Services: Not on file    Active Member of [address] [place] [address] or Organizations: Not on file    Attends Club or Organization Meetings: Not on file    Marital Status: Not on file   Intimate Partner Violence:     Fear of Current or Ex-Partner: Not on file    Emotionally Abused: Not on file    Physically Abused: Not on file    Sexually Abused: Not on file   Housing Stability:     Unable to Pay for Housing in the Last Year: Not on file    Number of [place] in the Last Year: Not on file    Unstable Housing in the Last Year: Not on file         Review of Systems:  History obtained from unobtainable from patient due to mental status        Objective:  Patient Vitals for the past 24 hrs:   BP Temp Temp src Pulse Resp SpO2 Weight   05/24/22 1000 (!) 109/51   100 (!) 33     05/24/22 0900 (!) 163/71   95 (!) 31 96 %    05/24/22 0800 (!) 166/67 98.9 °F (37.2 °C) Temporal 94 29 97 %  05/24/22 0700 (!) 164/72   96 (!) 31 97 %    05/24/22 0600 (!) 163/69   91 (!) 31 93 %    05/24/22 0530       141 lb 2 oz (64 kg)   05/24/22 0500 (!) 161/68   90 28 98 %    05/24/22 0400 (!) 159/66 98 °F (36.7 °C) Temporal 93 (!) 31 100 %    05/24/22 0300 (!) 153/63   89 26 96 %    05/24/22 0200 (!) 160/67   88 29 98 %    05/24/22 0000  98 °F (36.7 °C) Temporal       05/23/22 1900 (!) 144/62 98.6 °F (37 °C) Temporal 86 29 100 %    05/23/22 1700 (!) 140/61   87 (!) 2 100 %    05/23/22 1600 115/63 98.3 °F (36.8 °C) Temporal 89 19 90 %    05/23/22 1512     26 96 %    05/23/22 1500 129/71   87 25 95 %    05/23/22 1459 120/67         05/23/22 1400 112/67   86 29 91 %    05/23/22 1300 115/72   94 25 97 %    05/23/22 1208 (!) 161/69         05/23/22 1200 (!) 161/69 97.3 °F (36.3 °C) Temporal 89 26 98 %        Intake/Output Summary (Last 24 hours) at 5/24/2022 1143  Last data filed at 5/24/2022 1000  Gross per 24 hour   Intake 1140 ml   Output 260 ml   Net 880 ml     General: awake /unable to fully participate in examination due to altered mental status  Chest:  clear to auscultation bilaterally  CVS: regular rate and rhythm  Abdominal: soft, nontender, normal bowel sounds  Extremities: no cyanosis or edema  Skin: warm and dry without rash    Labs:  BMP:   Recent Labs     05/22/22  0230 05/23/22  0134 05/24/22  0143    132* 135*   K 4.9 5.2* 4.3   CL 91* 89* 91*   CO2 26 25 28   PHOS 5.1* 6.1* 4.1   BUN 56* 74* 37*   CREATININE 2.6* 3.2* 1.9*   CALCIUM 9.0 9.1 8.9     CBC:   Recent Labs     05/22/22  0230 05/23/22  0134 05/24/22  0143   WBC 8.2 8.4 6.3   HGB 7.6* 8.1* 8.3*   HCT 25.5* 26.9* 27.2*   MCV 96.6 95.4 96.1    258 312     LIVER PROFILE:   Recent Labs     05/22/22  0230 05/23/22  0134 05/24/22  0143   AST 11 10 139*   ALT 6 6 70*   BILITOT 0.3 0.3 0.3   ALKPHOS 116* 114* 120*     PT/INR: No results for input(s): PROTIME, INR in the last 72 hours.  APTT: No results for input(s): APTT in the last 72 hours. BNP:  No results for input(s): BNP in the last 72 hours. Ionized Calcium:No results for input(s): IONCA in the last 72 hours. Magnesium:  Recent Labs     05/22/22  0230 05/23/22  0134 05/24/22  0143   MG 2.5 2.5 2.3     Phosphorus:  Recent Labs     05/22/22 0230 05/23/22  0134 05/24/22 0143   PHOS 5.1* 6.1* 4.1     HgbA1C: No results for input(s): LABA1C in the last 72 hours. Hepatic:   Recent Labs     05/22/22 0230 05/23/22 0134 05/24/22 0143   ALKPHOS 116* 114* 120*   ALT 6 6 70*   AST 11 10 139*   PROT 6.0* 5.8* 5.9*   BILITOT 0.3 0.3 0.3   LABALBU 3.0* 2.8* 2.8*     Lactic Acid: No results for input(s): LACTA in the last 72 hours. Troponin: No results for input(s): CKTOTAL, CKMB, TROPONINT in the last 72 hours. ABGs:   Lab Results   Component Value Date    PHART 7.620 05/19/2022    PO2ART 127.0 05/19/2022    JHB2YWS 28.0 05/19/2022     CRP:  No results for input(s): CRP in the last 72 hours. Sed Rate:  No results for input(s): SEDRATE in the last 72 hours. Culture Results:   Blood Culture Recent:   Recent Labs     05/21/22  1901   BC No Growth to date. Any change in status will be called. Urine Culture Recent :   Recent Labs     05/22/22  1150   LABURIN >50,000 CFU/ml  Light growth of Yeast, ID to follow         Radiology reports as per the Radiologist  Radiology: CT Head WO Contrast    Result Date: 4/24/2022  CT HEAD WO CONTRAST 4/24/2022 2:01 PM HISTORY: Altered mental status COMPARISON: CT scan dated 11/18/2021 DOSE LENGTH PRODUCT: 766 mGy cm TECHNIQUE: Helical tomographic images of the brain were obtained without the use of intravenous contrast. Automated exposure control was also utilized to decrease patient radiation dose. FINDINGS: There is no evidence of evolving large vascular territory infarct. No visualized intra-axial or extra-axial hemorrhage. No mass lesion is identified.  Normal size and configuration of the ventricular system. The basal cisterns are symmetric. Posterior fossa structures are unremarkable. The included orbits and their contents are unremarkable. Chronic paranasal sinus disease on the left. The visualized osseous structures and overlying soft tissues of the skull and face are unremarkable. 1. No acute intracranial process. Signed by Dr Melvina Lomeli    XR CHEST PORTABLE    Result Date: 5/4/2022  XR CHEST PORTABLE 5/4/2022 4:40 AM HISTORY: Mechanical ventilation Comparison: 5/3/2022 Findings: Lines and tubes are stable in position. Minimal bibasilar atelectasis. Lungs are otherwise clear. No pleural effusion or pneumothorax. The cardiomediastinal silhouette and pulmonary vascularity are unchanged. No acute osseous or soft tissue abnormality is noted. Impression: 1. Minimal bibasilar atelectasis. Otherwise stable. Signed by Dr Melvina Lomeli    XR CHEST PORTABLE    Result Date: 5/3/2022  XR CHEST PORTABLE 5/3/2022 4:03 PM HISTORY: Postintubation film  Technique: Single AP view of the chest COMPARISONS: 4/24/2022 FINDINGS: Endotracheal tube is identified with tip approximately 2.2 cm above the krysta. Lungs are clear and well expanded. No pleural effusion or pneumothorax. Heart size is normal. Pulmonary vasculature are nondilated. Enteric tube is in good position with tip projecting over the gastric antrum. No acute bony abnormality. Left subclavian region vascular stent. 1. Status post endotracheal intubation with tube tip approximately 2.2 cm above the krysta. Lungs are clear and well expanded. 2. Enteric tube is in good position. Signed by Dr Madai Anand    Result Date: 4/24/2022  XR CHEST PORTABLE 4/24/2022 4:27 PM HISTORY: ET tube, enteric tube  Technique: Single AP view of the chest COMPARISONS: 4/24/2022 FINDINGS: Endotracheal tube is identified with tip essentially touching the krysta.  Enteric tube courses into the stomach with tip curled in the fundus back towards the gastroesophageal junction. The lungs are clear and well expanded. Heart size is stable. Pulmonary vasculature are nondilated. No pleural effusion or pneumothorax. 1. Endotracheal tube is deep, tip essentially touching the krysta. Lungs are clear and well expanded. I would recommend 2-3 cm withdrawal for a more optimal positioning. The results of this exam were discussed with Larry Charlton (ICU nursing) on 4/24/2022 at 1640 hours Signed by Dr Brooklyn Velasquez    XR CHEST PORTABLE    Result Date: 4/24/2022  XR CHEST PORTABLE 4/24/2022 1:17 PM HISTORY: ET tube placement  Technique: Single AP view of the chest COMPARISONS: 4/24/2022 FINDINGS: Status post endotracheal intubation. ET tube is in good position. Lungs are well-expanded. Enteric tube curls on itself in the midesophagus and then extends back up into the throat. Heart size is stable. Pulmonary vasculature are nondilated. 1. ET tube is in good position. Lungs are clear and well expanded. 2. Enteric tube curls on itself in the mid esophagus before extending back up into the throat. This will need repositioned. The results of this exam were discussed with Dr. Lawanda Paiz on 4/24/2022 at 1329 hours Signed by Dr Brooklyn Velasquez    XR CHEST PORTABLE    Result Date: 4/24/2022  XR CHEST PORTABLE 4/24/2022 12:34 PM HISTORY: Altered mental status  Technique: Single AP view of the chest COMPARISONS: Chest exam dated 4/1/2022 FINDINGS: No lung consolidation. No pleural effusion or pneumothorax. Cardiomediastinal silhouette and pulmonary vasculature are unremarkable. No acute bony abnormality. Left subclavian region vascular stent with additional stent projecting over the left humerus. No acute cardiopulmonary process.  Signed by Dr Brooklyn Velasquez     DUP CAROTID BILATERAL    Result Date: 4/29/2022  Vascular Carotid Procedure  Demographics   Patient Name    Carlene Park Age                   47   Patient Number  [id number]       Gender                Female   Visit Number [id number]    Interpreting          Cassy Bah                               Physician   Date of Birth   1970   Referring Physician   Katie Haynes   [employer]       [id number]   [address], [place], [address]  Number  Procedure Type of Study:   Cerebral:Carotid, VL CAROTID BILATERAL. Indications for Study:Stroke. Risk Factors   - The patient's risk factor(s) include: diabetes mellitus and arterial     hypertension.   - Current - Every day. Allergies   - Penicillins.   - Stadol.   - Other allergy:(Lamisil Advanced). Technical Quality:Limited visualization due to patient on ventilator. Impression   There is mixed plaque visualized in the bilateral internal carotid  artery(ies). There is less than 50% stenosis in the right internal carotid artery. There is less than 50% stenosis of the left internal carotid artery. There is normal antegrade flow in the bilateral vertebral artery(ies). right side is very limited due to positioning, her chin is resting on her  right clavicle and she is vented. Signature   ----------------------------------------------------------------  Electronically signed by Anne Leon(Interpreting  physician) on 04/29/2022 03:20 PM  ----------------------------------------------------------------  Blood Pressure:Right arm 178/69 mmHg. Velocities are measured in cm/s ; Diameters are measured in mm Carotid Right Measurements +------------+-------+-------+--------+-------+------------+---------------+ ! Location    ! PSV    ! EDV    ! Angle   ! RI     !%Stenosis   ! Tortuosity     ! +------------+-------+-------+--------+-------+------------+---------------+ ! Prox CCA    !47.2   !       !60      !       !            !               ! +------------+-------+-------+--------+-------+------------+---------------+ ! Mid CCA     !47.2   !       !60      !       !            !               ! +------------+-------+-------+--------+-------+------------+---------------+ !Prox ICA    !41     !7.46   !60      !0.82   !            !               ! +------------+-------+-------+--------+-------+------------+---------------+ ! Mid ICA     !62.7   !11.2   !60      !0.82   !            !               ! +------------+-------+-------+--------+-------+------------+---------------+ ! Dist ICA    !75.2   !13.7   !60      !0.82   !            !               ! +------------+-------+-------+--------+-------+------------+---------------+ ! Prox ECA    !147    !       !61      !       !            !               ! +------------+-------+-------+--------+-------+------------+---------------+ ! Vertebral   !47.2   !13.7   !60      !0.71   !            !               ! +------------+-------+-------+--------+-------+------------+---------------+   - There is antegrade vertebral flow noted on the right side. - Additional Measurements:ICAPSV/CCAPSV 1.59. Carotid Left Measurements +------------+-------+-------+--------+-------+------------+---------------+ ! Location    ! PSV    ! EDV    ! Angle   ! RI     !%Stenosis   ! Tortuosity     ! +------------+-------+-------+--------+-------+------------+---------------+ ! Prox CCA    !81.4   !9.94   !60      !0.88   !            !               ! +------------+-------+-------+--------+-------+------------+---------------+ ! Mid CCA     !70.2   !7.46   !60      !0.89   !            !               ! +------------+-------+-------+--------+-------+------------+---------------+ ! Prox ICA    !69.4   !11.9   !60      !0.83   !            !               ! +------------+-------+-------+--------+-------+------------+---------------+ ! Mid ICA     !80.6   !19.6   !60      !0.76   !            !               ! +------------+-------+-------+--------+-------+------------+---------------+ ! Dist ICA    !70.3   !12.5   !50      !0.82   !            !               ! +------------+-------+-------+--------+-------+------------+---------------+ ! Prox ECA    !113    !       !61      ! !            !               ! +------------+-------+-------+--------+-------+------------+---------------+ ! Vertebral   !38.8   !       !60      !       !            !               ! +------------+-------+-------+--------+-------+------------+---------------+   - There is antegrade vertebral flow noted on the left side. - Additional Measurements:ICAPSV/CCAPSV 0.99. ICAEDV/CCAEDV 1.97. MRI BRAIN WO CONTRAST    Result Date: 4/29/2022  Examination. MRI BRAIN WO CONTRAST 4/29/2022 11:43 AM History: Seizure activity. The multiplanar, multisequence MR imaging of the brain is performed without contrast enhancement. There is no previous similar study for comparison. The correlation made with CT scan of the head dated 4/24/2022. The diffusion-weighted imaging sequence including the ADC map show an area of acute infarction involving the left posterior frontal, parietal and basal occipital lobe, the left middle cerebral artery territory. A small linear focus of T2 signal in the right posterior paraventricular white matter has no corresponding ADC map changes and may represent a subacute or chronic process. The targeted images of the mesial temporal lobe show moderate symmetrical mesial temporal atrophy without presence of any abnormal signal. This may be a process of general volume loss/atrophy. Moderately dilated ventricles, basal cisterns and the cortical sulci suggestive chronic volume loss/atrophy. The orbits are suboptimally evaluated. No obvious abnormality. Limited visualized optic nerve, optic chiasma and optic tracts are normal and symmetrical. The pituitary gland appears normal. The corpus callosum, the brainstem and cerebellum are normal. The FLAIR sequence images show significant periventricular T2 signal changes representing chronic white matter ischemia and transependymal CSF flow.  The gradient recalled imaging sequence show normal flow void in the visualized intracranial vessels, particularly normal flow in the limited visualized left middle cerebral artery. There is mucosal thickening involving the ethmoid sinuses. The bilateral mastoid effusion. 1. Acute left middle cerebral territory infarct. 2. Chronic ischemic and atrophic changes. 3. The symmetrical mesial temporal atrophy is probably a part of the generalized volume loss. 4. Ethmoid sinusitis. Bilateral mastoid effusion. The above report was immediately called to the nurse in charge of the patient in CCU. Signed by Dr Juani Santana    ECHO 2D Debbie Gut    Result Date: 4/29/2022  Transthoracic Echocardiography Report (TTE)  Demographics   Patient Name  [facility] Date of Study          04/29/2022   MRN           [id number]       Gender                 Female   Date of Birth 1970   Room Number            [id number]   Age           47 year(s)   Height:       66 inches    Referring Physician    Mariposa Kilgore   Weight:       144 pounds   Sonographer            Jessie Madera [facility]   BSA:          1.74 m^2     Interpreting Physician Soco Gaston MD   BMI:          23.24 kg/m^2  Procedure Type of Study   TTE procedure:ECHO NO CONTRAST WITH DOP/COLR. Study Location: Portable Technical Quality: Adequate visualization Patient Status: Inpatient Contrast Medium: Bubble Study. Rhythm: Within normal limits HR: 57 bpm BP: 178/69 mmHg Indications:Stroke. Conclusions   Summary  Mitral valve leaflets are mildly thickened with preserved leaflet  mobility. Mild aortic stenosis. Mean gradient 14 mm hg  Tricuspid valve is structurally normal.  Mild tricuspid regurgitation. Moderately dilated left atrium. Normal intact intra-atrial septum was noted with no evidence of  significant intra-atrial communications by color flow Doppler or by  agitated saline study. Normal left ventricular size with preserved LV function and an estimated  ejection fraction of approximately 55-60%. Moderate concentric left ventricular hypertrophy.   No regional wall motion abnormalities. Grade II diastolic dysfunction   Signature   ----------------------------------------------------------------  Electronically signed by Amanda Espinosa MD(Interpreting  physician) on 04/30/2022 11:02 AM  ----------------------------------------------------------------   Findings   Mitral Valve  Mitral valve leaflets are mildly thickened with preserved leaflet  mobility. Aortic Valve  Mild aortic stenosis. Mean gradient 14 mm hg   Tricuspid Valve  Tricuspid valve is structurally normal.  Mild tricuspid regurgitation. Pulmonic Valve  The pulmonic valve was not well visualized . Left Atrium  Moderately dilated left atrium. Normal intact intra-atrial septum was noted with no evidence of  significant intra-atrial communications by color flow Doppler or by  agitated saline study. Left Ventricle  Normal left ventricular size with preserved LV function and an estimated  ejection fraction of approximately 55-60%. Moderate concentric left ventricular hypertrophy. No regional wall motion abnormalities. Grade II diastolic dysfunction   Right Atrium  Normal right atrial dimension with no evidence of thrombus or mass noted. Right Ventricle  Normal right ventricular size with preserved RV function. Pericardial Effusion  No evidence of significant pericardial effusion is noted. Pleural Effusion  No evidence of pleural effusion. Allergies   - Penicillins.   - Stadol.   - Other allergy:(Lamisil Advanced). 2D Measurements and Calculations(cm)   LVIDd: 4.4 cm                       LVIDs: 3.14 cm  IVSd: 1.41 cm  LVPWd: 1.43 cm                      AO Root Dimension: 2.4 cm  Rt. Vent.  Dimension: 2.64 cm        LA Dimension: 3.8 cm  % Ejection Fraction: 55 %           LA Area: 24.3 cm^2  LA Volume: 86.1 ml                  LV Systolic dimension: 6.18MY  LA Volume Index: 49 ml/m^2          LV PW diastolic: 6.70HC  LV dimension: 4.4 cm                LVOT diameter: 2 cm RV Diastolic dimension: 7.40PB  LVEDV: 115 ml                       LVEDVI: 66 ml/m^2  LVESV:36.3 ml                       LVESVI: 21 ml/m^2  Cardic Output (CO): 5.53l/min  Doppler Measurements and Calculations   AV Peak Velocity:257 cm/s              MV Peak E-Wave: 82.3 cm/s  AV Mean Velocity:178 cm/s              MV Peak A-Wave: 76.7 cm/s  AV Peak Gradient: 26.42 mmHg           MV E/A Ratio: 1.07 %  AV Mean Gradient: 14 mmHg              MV Mean velocity: 64.8cm/s  AV Area (Continuity):1.85 cm^2         MV Peak Gradient: 2.71 mmHg  AV VTI:52.4 cm/s                       MV Mean gradient: 2 mmHg  TR Velocity:207 cm/s  TR Gradient:17.14 mmHg   MV E' septal velocity: 5.66cm/s  MV E' lateral velocity:5.66 cm/s         Assessment   ESRD  DM2 with nephropathy on long-term insulin, uncontrolled  HTN  Hyponatremia  Hypokalemia  Secondary Hyperparathyroidism  Anemia CKD  Seizure disorder  Hypothyroidism   Acute ischemic cerebral infarction (left MCA distribution)    Plan:  Dialysis per routine scheduling (Monday Wednesday Friday currently)  Ultrafiltration as tolerated  Monitor labs  Neuro evaluation reviewed  Reviewed with nursing      Ami Ortega MD  05/24/22  11:43 AM

## 2022-05-25 VITALS
BODY MASS INDEX: 22.84 KG/M2 | RESPIRATION RATE: 20 BRPM | SYSTOLIC BLOOD PRESSURE: 143 MMHG | WEIGHT: 142.1 LBS | OXYGEN SATURATION: 100 % | HEART RATE: 90 BPM | HEIGHT: 66 IN | TEMPERATURE: 99 F | DIASTOLIC BLOOD PRESSURE: 64 MMHG

## 2022-05-25 PROBLEM — N39.0 UTI (URINARY TRACT INFECTION): Status: RESOLVED | Noted: 2021-11-21 | Resolved: 2022-05-25

## 2022-05-25 LAB
ALBUMIN SERPL-MCNC: 2.8 G/DL (ref 3.5–5.2)
ALP BLD-CCNC: 122 U/L (ref 35–104)
ALT SERPL-CCNC: 57 U/L (ref 5–33)
ANION GAP SERPL CALCULATED.3IONS-SCNC: 19 MMOL/L (ref 7–19)
AST SERPL-CCNC: 61 U/L (ref 5–32)
BASOPHILS ABSOLUTE: 0.1 K/UL (ref 0–0.2)
BASOPHILS RELATIVE PERCENT: 0.7 % (ref 0–1)
BILIRUB SERPL-MCNC: 0.3 MG/DL (ref 0.2–1.2)
BUN BLDV-MCNC: 60 MG/DL (ref 6–20)
CALCIUM SERPL-MCNC: 9 MG/DL (ref 8.6–10)
CHLORIDE BLD-SCNC: 89 MMOL/L (ref 98–111)
CO2: 24 MMOL/L (ref 22–29)
CREAT SERPL-MCNC: 2.8 MG/DL (ref 0.5–0.9)
EOSINOPHILS ABSOLUTE: 0.2 K/UL (ref 0–0.6)
EOSINOPHILS RELATIVE PERCENT: 1.8 % (ref 0–5)
GENTAMICIN DOSE AMOUNT: NORMAL
GENTAMICIN RANDOM: 6.8 UG/ML
GFR AFRICAN AMERICAN: 21
GFR NON-AFRICAN AMERICAN: 18
GLUCOSE BLD-MCNC: 140 MG/DL (ref 70–99)
GLUCOSE BLD-MCNC: 210 MG/DL (ref 74–109)
GLUCOSE BLD-MCNC: 257 MG/DL (ref 70–99)
HCT VFR BLD CALC: 27.7 % (ref 37–47)
HEMOGLOBIN: 8.6 G/DL (ref 12–16)
IMMATURE GRANULOCYTES #: 0 K/UL
LYMPHOCYTES ABSOLUTE: 0.9 K/UL (ref 1.1–4.5)
LYMPHOCYTES RELATIVE PERCENT: 8.6 % (ref 20–40)
MAGNESIUM: 2.7 MG/DL (ref 1.6–2.6)
MCH RBC QN AUTO: 29.6 PG (ref 27–31)
MCHC RBC AUTO-ENTMCNC: 31 G/DL (ref 33–37)
MCV RBC AUTO: 95.2 FL (ref 81–99)
MONOCYTES ABSOLUTE: 0.9 K/UL (ref 0–0.9)
MONOCYTES RELATIVE PERCENT: 8.4 % (ref 0–10)
NEUTROPHILS ABSOLUTE: 8.2 K/UL (ref 1.5–7.5)
NEUTROPHILS RELATIVE PERCENT: 80.2 % (ref 50–65)
ORGANISM: ABNORMAL
PDW BLD-RTO: 16.1 % (ref 11.5–14.5)
PERFORMED ON: ABNORMAL
PERFORMED ON: ABNORMAL
PHOSPHORUS: 5.8 MG/DL (ref 2.5–4.5)
PLATELET # BLD: 354 K/UL (ref 130–400)
PMV BLD AUTO: 9.7 FL (ref 9.4–12.3)
POTASSIUM SERPL-SCNC: 5.3 MMOL/L (ref 3.5–5)
RBC # BLD: 2.91 M/UL (ref 4.2–5.4)
SODIUM BLD-SCNC: 132 MMOL/L (ref 136–145)
TOTAL PROTEIN: 5.9 G/DL (ref 6.6–8.7)
URINE CULTURE, ROUTINE: ABNORMAL
URINE CULTURE, ROUTINE: ABNORMAL
VANCOMYCIN RANDOM: 18.1 UG/ML
WBC # BLD: 10.2 K/UL (ref 4.8–10.8)

## 2022-05-25 PROCEDURE — 2580000003 HC RX 258: Performed by: INTERNAL MEDICINE

## 2022-05-25 PROCEDURE — 80170 ASSAY OF GENTAMICIN: CPT

## 2022-05-25 PROCEDURE — 85025 COMPLETE CBC W/AUTO DIFF WBC: CPT

## 2022-05-25 PROCEDURE — 6360000002 HC RX W HCPCS: Performed by: INTERNAL MEDICINE

## 2022-05-25 PROCEDURE — 8010000000 HC HEMODIALYSIS ACUTE INPT

## 2022-05-25 PROCEDURE — 6370000000 HC RX 637 (ALT 250 FOR IP): Performed by: INTERNAL MEDICINE

## 2022-05-25 PROCEDURE — 6370000000 HC RX 637 (ALT 250 FOR IP): Performed by: SURGERY

## 2022-05-25 PROCEDURE — 99232 SBSQ HOSP IP/OBS MODERATE 35: CPT | Performed by: PSYCHIATRY & NEUROLOGY

## 2022-05-25 PROCEDURE — 82947 ASSAY GLUCOSE BLOOD QUANT: CPT

## 2022-05-25 PROCEDURE — A4216 STERILE WATER/SALINE, 10 ML: HCPCS | Performed by: SURGERY

## 2022-05-25 PROCEDURE — 6360000002 HC RX W HCPCS: Performed by: SURGERY

## 2022-05-25 PROCEDURE — 2500000003 HC RX 250 WO HCPCS: Performed by: SURGERY

## 2022-05-25 PROCEDURE — 80202 ASSAY OF VANCOMYCIN: CPT

## 2022-05-25 PROCEDURE — 2700000000 HC OXYGEN THERAPY PER DAY

## 2022-05-25 PROCEDURE — 6370000000 HC RX 637 (ALT 250 FOR IP): Performed by: PSYCHIATRY & NEUROLOGY

## 2022-05-25 PROCEDURE — 2580000003 HC RX 258: Performed by: SURGERY

## 2022-05-25 PROCEDURE — 83735 ASSAY OF MAGNESIUM: CPT

## 2022-05-25 PROCEDURE — 36415 COLL VENOUS BLD VENIPUNCTURE: CPT

## 2022-05-25 PROCEDURE — 99231 SBSQ HOSP IP/OBS SF/LOW 25: CPT

## 2022-05-25 PROCEDURE — 94640 AIRWAY INHALATION TREATMENT: CPT

## 2022-05-25 PROCEDURE — 80053 COMPREHEN METABOLIC PANEL: CPT

## 2022-05-25 PROCEDURE — 84100 ASSAY OF PHOSPHORUS: CPT

## 2022-05-25 RX ORDER — BUSPIRONE HYDROCHLORIDE 5 MG/1
5 TABLET ORAL 3 TIMES DAILY
Qty: 3 TABLET | Refills: 0 | Status: SHIPPED | OUTPATIENT
Start: 2022-05-25 | End: 2022-05-25

## 2022-05-25 RX ORDER — BUSPIRONE HYDROCHLORIDE 5 MG/1
5 TABLET ORAL 3 TIMES DAILY
Qty: 3 TABLET | Refills: 0 | Status: SHIPPED | OUTPATIENT
Start: 2022-05-25

## 2022-05-25 RX ORDER — ASPIRIN 81 MG/1
81 TABLET, CHEWABLE ORAL DAILY
Qty: 30 TABLET | Refills: 3 | Status: SHIPPED | OUTPATIENT
Start: 2022-05-26

## 2022-05-25 RX ORDER — LEVETIRACETAM 100 MG/ML
500 SOLUTION ORAL 2 TIMES DAILY
Qty: 300 ML | Refills: 0 | Status: SHIPPED | OUTPATIENT
Start: 2022-05-25

## 2022-05-25 RX ORDER — LISINOPRIL 20 MG/1
20 TABLET ORAL 2 TIMES DAILY
Qty: 30 TABLET | Refills: 3 | Status: SHIPPED | OUTPATIENT
Start: 2022-05-25

## 2022-05-25 RX ADMIN — ATORVASTATIN CALCIUM 20 MG: 20 TABLET, FILM COATED ORAL at 09:00

## 2022-05-25 RX ADMIN — LEVOTHYROXINE SODIUM 150 MCG: 75 TABLET ORAL at 06:01

## 2022-05-25 RX ADMIN — SEVELAMER CARBONATE 0.8 G: 800 POWDER, FOR SUSPENSION ORAL at 13:33

## 2022-05-25 RX ADMIN — DULOXETINE 60 MG: 60 CAPSULE, DELAYED RELEASE ORAL at 09:00

## 2022-05-25 RX ADMIN — LISINOPRIL 20 MG: 20 TABLET ORAL at 12:24

## 2022-05-25 RX ADMIN — HEPARIN SODIUM 5000 UNITS: 5000 INJECTION INTRAVENOUS; SUBCUTANEOUS at 14:42

## 2022-05-25 RX ADMIN — COLLAGENASE SANTYL: 250 OINTMENT TOPICAL at 12:26

## 2022-05-25 RX ADMIN — NIFEDIPINE 90 MG: 90 TABLET, FILM COATED, EXTENDED RELEASE ORAL at 12:25

## 2022-05-25 RX ADMIN — BUSPIRONE HYDROCHLORIDE 5 MG: 5 TABLET ORAL at 09:00

## 2022-05-25 RX ADMIN — HEPARIN SODIUM 5000 UNITS: 5000 INJECTION INTRAVENOUS; SUBCUTANEOUS at 08:59

## 2022-05-25 RX ADMIN — HYDRALAZINE HYDROCHLORIDE 10 MG: 20 INJECTION INTRAMUSCULAR; INTRAVENOUS at 02:06

## 2022-05-25 RX ADMIN — METOPROLOL SUCCINATE 100 MG: 50 TABLET, EXTENDED RELEASE ORAL at 12:24

## 2022-05-25 RX ADMIN — LEVETIRACETAM 500 MG: 500 SOLUTION ORAL at 08:59

## 2022-05-25 RX ADMIN — CHLORHEXIDINE GLUCONATE 15 ML: 1.2 RINSE ORAL at 12:26

## 2022-05-25 RX ADMIN — IPRATROPIUM BROMIDE AND ALBUTEROL SULFATE 1 AMPULE: 2.5; .5 SOLUTION RESPIRATORY (INHALATION) at 06:26

## 2022-05-25 RX ADMIN — IPRATROPIUM BROMIDE AND ALBUTEROL SULFATE 1 AMPULE: 2.5; .5 SOLUTION RESPIRATORY (INHALATION) at 14:10

## 2022-05-25 RX ADMIN — VANCOMYCIN HYDROCHLORIDE 750 MG: 750 INJECTION, POWDER, LYOPHILIZED, FOR SOLUTION INTRAVENOUS at 13:35

## 2022-05-25 RX ADMIN — ANORECTAL OINTMENT: 15.7; .44; 24; 20.6 OINTMENT TOPICAL at 06:04

## 2022-05-25 RX ADMIN — HYDRALAZINE HYDROCHLORIDE 100 MG: 50 TABLET, FILM COATED ORAL at 13:33

## 2022-05-25 RX ADMIN — SODIUM CHLORIDE, PRESERVATIVE FREE 20 MG: 5 INJECTION INTRAVENOUS at 08:59

## 2022-05-25 RX ADMIN — ASPIRIN 81 MG 81 MG: 81 TABLET ORAL at 09:00

## 2022-05-25 RX ADMIN — BUSPIRONE HYDROCHLORIDE 5 MG: 5 TABLET ORAL at 13:33

## 2022-05-25 RX ADMIN — HYDRALAZINE HYDROCHLORIDE 100 MG: 50 TABLET, FILM COATED ORAL at 06:01

## 2022-05-25 RX ADMIN — SEVELAMER CARBONATE 0.8 G: 800 POWDER, FOR SUSPENSION ORAL at 08:59

## 2022-05-25 RX ADMIN — ACETYLCYSTEINE 600 MG: 200 SOLUTION ORAL; RESPIRATORY (INHALATION) at 06:26

## 2022-05-25 RX ADMIN — IPRATROPIUM BROMIDE AND ALBUTEROL SULFATE 1 AMPULE: 2.5; .5 SOLUTION RESPIRATORY (INHALATION) at 10:25

## 2022-05-25 RX ADMIN — IPRATROPIUM BROMIDE AND ALBUTEROL SULFATE 1 AMPULE: 2.5; .5 SOLUTION RESPIRATORY (INHALATION) at 01:20

## 2022-05-25 RX ADMIN — INSULIN LISPRO 3 UNITS: 100 INJECTION, SOLUTION INTRAVENOUS; SUBCUTANEOUS at 07:44

## 2022-05-25 NOTE — DISCHARGE SUMMARY
Hospitalist Discharge Summary  [facility]    Patient: Danitza Helton  : 1970  MRN: [id number]  Code Status: Full Code  PCP: ROMINA Strong  Attending: Maritza Allred MD  Admission Date: 2022  Discharge Date: 2022    Discharge Medications:     Current Discharge Medication List           Details   aspirin 81 MG chewable tablet Take 1 tablet by mouth daily  Qty: 30 tablet, Refills: 3      levETIRAcetam (KEPPRA) 100 MG/ML solution 5 mLs by Per G Tube route 2 times daily  Qty: 300 mL, Refills: 0      lisinopril (PRINIVIL;ZESTRIL) 20 MG tablet 1 tablet by Per NG tube route in the morning and at bedtime  Qty: 30 tablet, Refills: 3      vancomycin (VANCOCIN) infusion Infuse 750 mg intravenously once for 1 dose Further dosing per facility. Compound per protocol. Qty: 0.75 g, Refills: 0      sodium chloride (PF) 0.9 % SOLN 10 mL with famotidine 20 MG/2ML SOLN 20 mg Infuse 20 mg intravenously daily  Qty: 10 mL, Refills: 0      busPIRone (BUSPAR) 5 MG tablet Take 1 tablet by mouth 3 times daily  Qty: 3 tablet, Refills: 0      gentamicin (GARAMYCIN) infusion Infuse 193.5 mg intravenously once for 1 dose Further dosing per facility. Compound per protocol.   Qty: 0.1935 g, Refills: 0              Details   DULoxetine (CYMBALTA) 60 MG extended release capsule Take 1 capsule by mouth daily Hold Cymbalta while taking Zyvox Antibiotic  Qty: 30 capsule, Refills: 0      insulin glargine (BASAGLAR KWIKPEN) 100 UNIT/ML injection pen Inject 5 Units into the skin nightly Indications: Diabetes  Qty: 1 pen, Refills: 0      lactulose (CHRONULAC) 10 GM/15ML solution Take 15 g by mouth 3 times daily With meals for constipation, hold for loose stools      nicotine (NICODERM CQ) 14 MG/24HR Place 1 patch onto the skin daily  Qty: 30 patch, Refills: 3      isosorbide mononitrate (IMDUR) 120 MG extended release tablet Take 1 tablet by mouth daily  Qty: 30 tablet, Refills: 3      hydrALAZINE (APRESOLINE) 100 MG tablet Take 1 tablet by mouth every 8 hours  Qty: 90 tablet, Refills: 3      metoprolol succinate (TOPROL XL) 100 MG extended release tablet Take 1 tablet by mouth every morning (before breakfast)  Qty: 30 tablet, Refills: 3      NIFEdipine (PROCARDIA XL) 30 MG extended release tablet Take 3 tablets by mouth daily  Qty: 30 tablet, Refills: 3      Ergocalciferol (VITAMIN D2) 10 MCG (400 UNIT) TABS Take 1.25 mg by mouth every 14 days      senna (SENNA-LAX) 8.6 MG tablet Take 1 tablet by mouth daily      sevelamer (RENVELA) 800 MG tablet Take 1 tablet by mouth 3 times daily (with meals)      albuterol sulfate  (90 Base) MCG/ACT inhaler Inhale 2 puffs into the lungs every 4 hours as needed      levothyroxine (SYNTHROID) 150 MCG tablet Take 150 mcg by mouth Daily      rOPINIRole (REQUIP) 2 MG tablet Take 4 mg by mouth nightly Indications: Restless Leg Syndrome       simvastatin (ZOCOR) 40 MG tablet Take 40 mg by mouth nightly             Discharge Instructions:   Recommended Follow Up:  DO Elsy An [facility] 22     Schedule an appointment as soon as possible for a visit      Jessee Sanchez MD  [address] [facility] [address]  [phone]    Schedule an appointment as soon as possible for a visit      ROMINA Mcpherson  [address] [facility],[address]  [facility] [address]  [phone]    Schedule an appointment as soon as possible for a visit      Hospital Course:   Acute left MCA infarct  Per MRI brain 4/29/2022  Neurology following  Neurochecks  Meds per neurology     Seizure  Neurology following  AEDs per neurology  Seizure precautions     MRSA/Raoultella ornithinolytica/Proteus vulgaris respiratory culture positivity  Agents on board per sensitivities     Ventilator dependent acute respiratory failure  Secondary to above processes  Multiple failed SBT  Failed extubation 5/30/2022  S/p trach/PEG 5/10/2022 per Dr. Paddy Kingston L     ESRD on HD  Renal following     Medical noncompliance       C. difficile infection  Completed oral vancomycin course     Tobacco dependence       IDDM 2  Meds on board    Patient accepted to [facility] on 5/25/2022  Neurology/renal states patient stable for discharge  Patient medically stable for discharge  Patient discharged to [facility] on 5/25/2022 in stable condition    Discharge Exam:   /63   Pulse 95   Temp 99 °F (37.2 °C) (Temporal)   Resp 24   Ht 5' 6\" (1.676 m)   Wt 142 lb 1.6 oz (64.5 kg)   SpO2 97%   BMI 22.94 kg/m²     General: no acute distress  HEENT: normocephalic, atraumatic  Lungs: faint rhonchi  Cardiovascular: s1 and s2 normal  Abdomen: soft, positive bowel sounds  Extremities: no cyanosis   Neuro: trached    Recent Labs     05/23/22 0134 05/24/22  0143 05/25/22  0340   WBC 8.4 6.3 10.2   HGB 8.1* 8.3* 8.6*    312 354     Recent Labs     05/23/22 0134 05/24/22  0143 05/25/22  0340   * 135* 132*   K 5.2* 4.3 5.3*   CL 89* 91* 89*   CO2 25 28 24   BUN 74* 37* 60*   CREATININE 3.2* 1.9* 2.8*   GLUCOSE 144* 227* 210*     Recent Labs     05/23/22 0134 05/24/22  0143 05/25/22  0340   AST 10 139* 61*   ALT 6 70* 57*   BILITOT 0.3 0.3 0.3   ALKPHOS 114* 120* 122*     Troponin T: No results for input(s): TROPONINI in the last 72 hours. Pro-BNP: No results for input(s): BNP in the last 72 hours. INR: No results for input(s): INR in the last 72 hours. UA:No results for input(s): NITRITE, COLORU, PHUR, LABCAST, WBCUA, RBCUA, MUCUS, TRICHOMONAS, YEAST, BACTERIA, CLARITYU, SPECGRAV, LEUKOCYTESUR, UROBILINOGEN, BILIRUBINUR, BLOODU, GLUCOSEU, AMORPHOUS in the last 72 hours. A1C: No results for input(s): LABA1C in the last 72 hours. ABG:No results for input(s): PHART, TRV1KVM, PO2ART, UBM2GYQ, BEART, HGBAE, L8CHVSON, CARBOXHGBART in the last 72 hours.      Discharge Disposition: Josie Lacey MD  5/25/2022 2:12 PM

## 2022-05-25 NOTE — PROGRESS NOTES
[address] [facility] Pharmacokinetic Monitoring Service - Vancomycin    Consulting Provider: Dr. Wisnome Aguiar   Indication: pneumonia (VAP)  Target Concentration: Pre-Dialysis Concentration 21-24 mg/L  Day of Therapy: 18  Additional Antimicrobials: Gentamicin    Pertinent Laboratory Values: Wt Readings from Last 1 Encounters:   05/24/22 141 lb 2 oz (64 kg)     Temp Readings from Last 1 Encounters:   05/25/22 98 °F (36.7 °C) (Temporal)     Recent Labs     05/24/22  0143 05/25/22  0340   CREATININE 1.9* 2.8*   WBC 6.3 10.2     Pertinent Cultures:  Culture Date Source Results   05/06/22 Respiratory MRSA; Raoultella ornithinolytica;  Proteus vulgaris   05/21/22 Blood x 2  No growth to date   05/22/22 Respiratory MRSA  Proteus vulgaris   05/22/22 Urine Light growth of yeast, ID to follow     MRSA Nasal Swab:  positive on 05/09/22    Recent vancomycin administrations                     vancomycin (VANCOCIN) 500 mg in dextrose 5 % 100 mL IVPB (mini-bag) (mg) 500 mg New Bag 05/23/22 1707                    Assessment:  Date/Time Current Dose Concentration Dialysis Session   05/25 at 0340 Pulse dosing for HD 18.1 (pre-HD level) MWF     Plan:  Concentration-guided dosing due to renal impairment and intermittent hemodialysis   Current dosing regimen is sub-therapeutic   Increase dose to 750 mg IV x 1 dose after scheduled HD session today  Vancomycin concentration ordered for 05/27 @ 0400, prior to HD session   Pharmacy will continue to monitor patient and adjust therapy as indicated    Thank you for the consult,  Diamond Craft [facility]  5/25/2022 4:38 AM

## 2022-05-25 NOTE — PROGRESS NOTES
Palliative Care Progress Note  5/25/2022 3:18 PM    Patient:  Yamilet Starkey  YOB: 1970  Primary Care Physician: ROMINA Hoyos  Advance Directive: Full Code  Admit Date: 4/24/2022       Hospital Day: 32  Portions of this note have been copied forward, however, changed to reflect the most current clinical status of this patient. CHIEF COMPLAINT/REASON FOR CONSULTATION Goals of care    SUBJECTIVE:  Ms. Rachel Booker is alert, resting supine in hospital bed. Does not follow commands. Tolerated dialysis today. Review of Systems:   14 point review of systems is negative except as specifically addressed above. Objective:   VITALS:  BP (!) 146/68   Pulse 94   Temp 99 °F (37.2 °C) (Temporal)   Resp 22   Ht 5' 6\" (1.676 m)   Wt 142 lb 1.6 oz (64.5 kg)   SpO2 100%   BMI 22.94 kg/m²   24HR INTAKE/OUTPUT:      Intake/Output Summary (Last 24 hours) at 5/25/2022 1518  Last data filed at 5/25/2022 1200  Gross per 24 hour   Intake 931 ml   Output 50 ml   Net 881 ml     General appearance: 46 yo female, chronically ill appearing, trached, NAD  Head: Normocephalic, without obvious abnormality, atraumatic  Eyes: conjunctivae/corneas clear. PERRL  Ears: normal external ears and nose  Neck: supple, symmetrical, trachea midline, trach present  Lungs: Soft scattered rhonchi heard bilaterally to ausculation, trach with trach collar in place  Heart: RRR, S1, S2 normal, no murmur  Abdomen: soft, no grimacing w/ palpation; non-distended, bowel sounds present.  FMS in place with small diarrhea noted   Extremities: No extremity edema,  No erythema, no grimacing w/ palpation   Skin: warm, dry, pale  Neurologic: Trached and off sedation, alert, tracking at times, but still not following commands, right side neglect continues, spontaneously moving LUE    Medications:      sodium chloride      dextrose        lisinopril  20 mg Per NG tube BID    busPIRone  5 mg Oral TID    insulin glargine  8 Units SubCUTAneous Nightly    sevelamer  0.8 g Oral TID WC    insulin lispro  0-6 Units SubCUTAneous TID WC    insulin lispro  0-3 Units SubCUTAneous Nightly    levETIRAcetam  500 mg Per G Tube BID    atorvastatin  20 mg Oral Daily    rOPINIRole  4 mg Oral Nightly    NIFEdipine  90 mg Oral Daily    metoprolol succinate  100 mg Oral QAM AC    levothyroxine  150 mcg Oral Daily    isosorbide mononitrate  120 mg Oral Daily    DULoxetine  60 mg Oral Daily    collagenase   Topical Daily    sodium chloride flush  5-40 mL IntraVENous 2 times per day    vancomycin (VANCOCIN) intermittent dosing (placeholder)   Other RX Placeholder    aminoglycoside intermittent dosing (placeholder)   Other RX Placeholder    acetylcysteine  600 mg Inhalation BID    ipratropium-albuterol  1 ampule Inhalation Q4H    aspirin  81 mg Oral Daily    heparin (porcine)  5,000 Units SubCUTAneous TID    menthol-zinc oxide   Topical BID    hydrALAZINE  100 mg Oral 3 times per day    chlorhexidine  15 mL Mouth/Throat BID    famotidine (PEPCID) injection  20 mg IntraVENous Daily     LORazepam, sodium zirconium cyclosilicate, sodium chloride flush, sodium chloride, hydrALAZINE, racepinephrine HCl, sodium chloride nebulizer, Peppermint Spirit, sodium phosphate IVPB **OR** sodium phosphate IVPB, glucagon (rDNA), dextrose, glucose, dextrose bolus **OR** dextrose bolus, LORazepam, ondansetron **OR** ondansetron, polyethylene glycol, acetaminophen **OR** acetaminophen, magic butt cream, magnesium sulfate, potassium alternative oral replacement **OR** potassium chloride  ADULT TUBE FEEDING; PEG; Peptide Based High Protein; Continuous; 20; Yes; 10; Q 6 hours; 48; 0; Other (specify); no free water flush     Lab and other Data:     Recent Labs     05/23/22  0134 05/24/22  0143 05/25/22  0340   WBC 8.4 6.3 10.2   HGB 8.1* 8.3* 8.6*    312 354     Recent Labs     05/23/22  0134 05/24/22  0143 05/25/22  0340   * 135* 132*   K 5.2* 4.3 5.3*   CL 89* 91* 89*   CO2 25 28 24   BUN 74* 37* 60*   CREATININE 3.2* 1.9* 2.8*   GLUCOSE 144* 227* 210*     Recent Labs     05/23/22  0134 05/24/22  0143 05/25/22  0340   AST 10 139* 61*   ALT 6 70* 57*   BILITOT 0.3 0.3 0.3   ALKPHOS 114* 120* 122*       Assessment/Plan   Principal Problem:    Acute ischemic stroke (HCC)  Active Problems:    Weakness    Severe malnutrition (HCC)    Gastroesophageal reflux disease without esophagitis    Acquired hypothyroidism    Anemia in chronic kidney disease (CKD)    Type 2 diabetes mellitus with chronic kidney disease on chronic dialysis, with long-term current use of insulin (HCC)    Acute encephalopathy    Respiratory failure ([facility]    Uncontrolled type 2 diabetes mellitus with complication, with long-term current use of insulin (HCC)    End stage renal disease (HCC)    PVD (peripheral vascular disease) (HCC)    ESRD on hemodialysis (HCC)    Hyponatremia    Hyperglycemia due to type 2 diabetes mellitus ([facility]    Uncontrolled hypertension    Seizure ([facility]    Palliative care patient    Uncontrolled type 2 diabetes mellitus with hyperosmolar nonketotic hyperglycemia (HCC)    Altered mental state    Acute hypoxemic respiratory failure (HCC)    Generalized weakness    UTI (urinary tract infection)    Closed fracture of left distal femur ([facility])    History of infection with vancomycin resistant Enterococcus (VRE)  Resolved Problems:    * No resolved hospital problems. *      Visit Summary:  Chart reviewed and discussed patient with RN. No acute events overnight. Ms. Rachel Booker is seen at bedside this afternoon and is clinically the same. Plans to transfer to [facility] today since pt has been accepted. Remains off sedation with trach collar in place. PRN ativan IVP available but has not needed since 5/22/22. I called and spoke with her signficant other/legal decision maker, Maria E Lainez, and updated him on pts status and possible d/c today.  He states he also spoke with a coordinator at [facility] this afternoon and has no additional questions or needs at this time. Recommendations:   1. Palliative care: GOC d/c to [facility], [facility] today. Code status: FULL CODE    2. Acute hypoxemic respiratory failure- Extubated 5/3/22 but required reintubation that afternoon. Original intubation date 4/24/22. Now with trach/PEG 5/10/22. Weaned to trach collar 6 L    3. Acute left MCA infarct- mgmt per neuro. MRI 4/29/22 noted. Echo negative 4/30/22. On ASA. Supportive care    4. Hyperglycemia w/ Hx of DM II-mgmt per Hospitalist, SSI    5. S/p tonic-clonic seizure-mgmt per Neurology. No further seizure activity. Keppra 500 mg BID    6. ESRD on HD-Nephrology managing. Dialysis completed today    7. Hypertension-Hydralazine 100 mg TID, cardene gtt as needed    8. Clostridium difficile/Norovirus- Oral vancomycin completed 5/8/22    9. MRSA/Raoultella ornithinolytica/Proteus vulgaris respiratory positivity- empiric abx per hospitalist. 100.1  Temp 5/21/22. Repeat blood cultures and UA, NGTD. Repeat sputum culture 5/22/22 growth positive for Staph aureus MRSA and Proteus vulgaris    Thank you for consulting Palliative Care and allowing us to participate in the care of this patient.    Time Spent Counseling > 50%:  YES                                   Total Time Spent with patient/family counseling, workup/treatment review, counseling and placement of orders/preparation of this note: 21 minutes    Electronically signed by ROMINA Neri CNP on 5/25/2022 at 3:18 PM    (Please note that portions of this note were completed with a voice recognition program.  Jagdish Schafer made to edit the dictations but occasionally words are mis-transcribed.)

## 2022-05-25 NOTE — PROGRESS NOTES
Pharmacy Aminoglycoside Consult    Aminoglycoside: Gentamicin  Day: 18  Current Dosing: pulse dosing for HD - MWF    Temp max: 98.9    Estimated Creatinine Clearance: 32 mL/min (A) (based on SCr of 1.9 mg/dL (H)). Recent Labs     05/23/22  0134 05/24/22  0143   BUN 74* 37*       Recent Labs     05/23/22  0134 05/24/22  0143   CREATININE 3.2* 1.9*       Recent Labs     05/24/22  0143 05/25/22  0340   WBC 6.3 10.2     Culture Date Source Results   05/06/22 Respiratory MRSA; Raoultella ornithinolytica; Proteus vulgaris   05/21/22 Blood x 2  No growth to date   05/22/22 Respiratory MRSA  Proteus vulgaris   05/22/22 Urine Light growth of yeast, ID to follow         RANDOM LEVEL: 6.8 (pre-HD level) 6.4 also noted in chart. Talked with Sarai in lab, and 6.8 is the correct pre-HD level. ASSESSMENT/PLAN: No Gentamicin to be given today. Will order a level prior to next HD session on Friday, 05/27.     Electronically signed by Ilir Rodrgiuez [facility] on 5/25/2022 at 4:27 AM

## 2022-05-25 NOTE — PROGRESS NOTES
Nephrology ([address] [facility]) Progress Note    Patient:  Maia Farias  YOB: 1970  Date of Service: 5/25/2022  MRN: [id number]   Acct: [de-identified]   Primary Care Physician: ROMINA Denny  Advance Directive: Full Code  Admit Date: 4/24/2022       Hospital Day: 32  Referring Provider: Carlyn Dennis MD    Patient independently seen and examined, Chart, Consults, Notes, Operative notes, Labs, Cardiology, and Radiology studies reviewed as available. Subjective:  Maia Farias is a 47 y.o. female for whom we were consulted for evaluation and treatment of end-stage renal disease. Patient also has a history of seizure disorder, type 2 diabetes, hypertension, frequent hospitalization with noncompliance and poorly controlled diabetes. Patient was brought to the emergency room after she was found to have unresponsiveness and witnessed grand mal seizure. She was brought in by ambulance. On arrival in the emergency room, she was found to be severely hyperglycemic and had a urinary tract infection. Patient was intubated, sedated and treated by neurology for grand mal seizure. She was also receiving IV antibiotics and insulin drip. Her blood sugar control had significantly improved. On April 25, she has received emergent hemodialysis treatment for correction of volume status as well as hyponatremia. Today, patient remains unable to participate in the history and physical examination. Events reviewed with nursing at the bedside. No family present. No issues with dialysis today. Using trach collar.       Dialysis   Pt was seen on renal replacement therapy and I have personally seen and evaluated the patient and directed the therapy  Modality: Hemodialysis  Access: Arterial Venous Fistula  Location: left upper  QB: 600  QD: 800  UF: 1280            Allergies:  Penicillins, Lamisil advanced [terbinafine], Reglan [metoclopramide], and Stadol [butorphanol]    Medicines:  Current Facility-Administered Medications   Medication Dose Route Frequency Provider Last Rate Last Admin    vancomycin (VANCOCIN) 750 mg in dextrose 5 % 250 mL IVPB  750 mg IntraVENous Once Inetta Listen, DO        lisinopril (PRINIVIL;ZESTRIL) tablet 20 mg  20 mg Per NG tube BID Hemal Mcnulty MD   20 mg at 05/24/22 1913    busPIRone (BUSPAR) tablet 5 mg  5 mg Oral TID Nicole Rainey MD   5 mg at 05/25/22 0900    LORazepam (ATIVAN) injection 0.5 mg  0.5 mg IntraVENous Q6H PRN Delon Ramos DO   0.5 mg at 05/22/22 0328    insulin glargine (LANTUS) injection vial 8 Units  8 Units SubCUTAneous Nightly Nicole Rainey MD   8 Units at 05/24/22 2003    sodium zirconium cyclosilicate (LOKELMA) oral suspension 10 g  10 g Oral TID PRN Hemal Mcnulty MD        sevelamer (RENVELA) packet 0.8 g  0.8 g Oral TID GIANNA Rainey MD   0.8 g at 05/25/22 0859    insulin lispro (HUMALOG) injection vial 0-6 Units  0-6 Units SubCUTAneous TID GIANNA Rainey MD   3 Units at 05/25/22 0744    insulin lispro (HUMALOG) injection vial 0-3 Units  0-3 Units SubCUTAneous Nightly Nicole Rainey MD   2 Units at 05/24/22 2002    levETIRAcetam (KEPPRA) 100 MG/ML solution 500 mg  500 mg Per G Tube BID Alayna Ozuna MD   500 mg at 05/25/22 0859    atorvastatin (LIPITOR) tablet 20 mg  20 mg Oral Daily Nicole Rainey MD   20 mg at 05/25/22 0900    rOPINIRole (REQUIP) tablet 4 mg  4 mg Oral Nightly Nicole Rainey MD   4 mg at 05/24/22 1913    NIFEdipine (PROCARDIA XL) extended release tablet 90 mg  90 mg Oral Daily Nicole Rainey MD   90 mg at 05/24/22 0843    metoprolol succinate (TOPROL XL) extended release tablet 100 mg  100 mg Oral QAM AC Nicole Rainey MD   100 mg at 05/24/22 3335    levothyroxine (SYNTHROID) tablet 150 mcg  150 mcg Oral Daily Nicole Rainey MD   150 mcg at 05/25/22 0601    isosorbide mononitrate (IMDUR) extended release tablet 120 mg  120 mg Oral Daily Nicole Rainey MD   120 mg at 05/24/22 0843    DULoxetine (CYMBALTA) extended release capsule 60 mg  60 mg Oral Daily Azeb Martin MD   60 mg at 05/25/22 0900    collagenase ointment   Topical Daily Azeb Martin MD   Given at 05/24/22 0845    sodium chloride flush 0.9 % injection 5-40 mL  5-40 mL IntraVENous 2 times per day Fred Giraldo MD   10 mL at 05/24/22 1914    sodium chloride flush 0.9 % injection 5-40 mL  5-40 mL IntraVENous PRN Fred Giraldo MD        0.9 % sodium chloride infusion   IntraVENous PRN Fred Giraldo MD        vancomycin Coleman Grater) intermittent dosing (placeholder)   Other RX Jazmine Betancourt MD        aminoglycoside intermittent dosing (placeholder)   Other RX Placeholder Stiven Cabezas MD        acetylcysteine (MUCOMYST) 20 % solution 600 mg  600 mg Inhalation BID Stiven Cabezas MD   600 mg at 05/25/22 8246    ipratropium-albuterol (DUONEB) nebulizer solution 1 ampule  1 ampule Inhalation Q4H Stiven Cabezas MD   1 ampule at 05/25/22 1025    hydrALAZINE (APRESOLINE) injection 10 mg  10 mg IntraVENous Q6H PRN Stiven Cabezas MD   10 mg at 05/25/22 0206    aspirin chewable tablet 81 mg  81 mg Oral Daily Stiven Cabezas MD   81 mg at 05/25/22 0900    racepinephrine HCl (VAPONEFPRIN) 2.25 % nebulizer solution NEBU 11.25 mg  11.25 mg Nebulization Q4H PRN Stiven Cabezas MD   11.25 mg at 05/03/22 1408    sodium chloride nebulizer 0.9 % solution 3 mL  3 mL Nebulization Q4H PRN Stiven Cabezas MD        heparin (porcine) injection 5,000 Units  5,000 Units SubCUTAneous TID Stiven Cabezas MD   5,000 Units at 05/25/22 6903    Peppermint Spirit SPRT   Does not apply PRN Stiven Cabezas MD        menthol-zinc oxide (CALMOSEPTINE) 0.44-20.6 % ointment   Topical BID Azeb Martin MD   Given at 05/25/22 0604    sodium phosphate 10.5 mmol in sodium chloride 0.9 % 250 mL IVPB  0.16 mmol/kg IntraVENous PRN Stiven Cabezas MD        Or    sodium phosphate 21 mmol in sodium chloride 0.9 % 250 mL IVPB  0.32 mmol/kg IntraVENous PRN Sergey Logan MD        hydrALAZINE (APRESOLINE) tablet 100 mg  100 mg Oral 3 times per day Sergey Logan MD   100 mg at 05/25/22 0601    glucagon (rDNA) injection 1 mg  1 mg IntraMUSCular PRN Sergey Logan MD        dextrose 5 % solution  100 mL/hr IntraVENous PRN Sergey Logan MD        glucose chewable tablet 16 g  4 tablet Oral PRN Sergey Logan MD        dextrose bolus (hypoglycemia) 10% 125 mL  125 mL IntraVENous PRN Sergey Logan MD   Stopped at 05/11/22 2016    Or    dextrose bolus (hypoglycemia) 10% 250 mL  250 mL IntraVENous PRN Sergey Logan MD   Stopped at 05/10/22 2026    LORazepam (ATIVAN) injection 1 mg  1 mg IntraVENous Q5 Min PRN Sergey Logan MD        ondansetron (ZOFRAN-ODT) disintegrating tablet 4 mg  4 mg Oral Q8H PRN Sergey Logan MD        Or    ondansetron [facility]) injection 4 mg  4 mg IntraVENous Q6H PRN Sergey Logan MD        polyethylene glycol [facility]) packet 17 g  17 g Oral Daily PRN Sergey Logan MD        acetaminophen (TYLENOL) tablet 650 mg  650 mg Oral Q6H PRN Sergey Logan MD   650 mg at 05/21/22 1627    Or    acetaminophen (TYLENOL) suppository 650 mg  650 mg Rectal Q6H PRN Sergey Logan MD        chlorhexidine (PERIDEX) 0.12 % solution 15 mL  15 mL Mouth/Throat BID Sergey Logan MD   15 mL at 05/24/22 1917    famotidine (PEPCID) 20 mg in sodium chloride (PF) 10 mL injection  20 mg IntraVENous Daily Sergey Logan MD   20 mg at 05/25/22 5926    magic butt cream   Topical Q4H PRN Sergey Logan MD   Given at 05/03/22 0940    magnesium sulfate 1000 mg in dextrose 5% 100 mL IVPB  1,000 mg IntraVENous PRN Sergey Logan MD        potassium bicarb-citric acid (EFFER-K) effervescent tablet 40 mEq  40 mEq Oral PRN Sergey Logan MD        Or    potassium chloride 10 mEq/100 mL IVPB (Peripheral Line)  10 mEq IntraVENous PRN Sergey Logan MD   Stopped at 04/25/22 4973       Past Medical History:  Past Medical History:   Diagnosis Date    Acute ischemic stroke ([facility] 4/30/2022    Arthritis     Chronic kidney disease, stage 5, kidney failure (HCC)     Closed fracture of right shoulder girdle, with routine healing, subsequent encounter     DM (diabetes mellitus) type II controlled with renal manifestation ([facility] 06/1993    Gastroparesis     GERD (gastroesophageal reflux disease)     Hemodialysis patient ([facility]     dialysis on tues, thur, and sat at [facility]    Hypertension     Hypothyroid     Nasal congestion     recent    Neuropathy     Noncompliance with medications     Palliative care patient 10/08/2019    Restless leg syndrome        Past Surgical History:  Past Surgical History:   Procedure Laterality Date    BREAST SURGERY Left 2008    infected milk gland removed    BREAST SURGERY Right 5/25/2021    WEDGE EXCISION RIGHT BREAST DUCT WITH LACRIMAL DUCT PROBE, PEC BLOCK performed by Johny Lucio MD at [address] [place] [address]      2008    COLONOSCOPY Left 9/17/2020    Dr Jodie Vicente hepatic flexure-Severe tortuosity with severe spasming, 1 yr recall    DIALYSIS FISTULA CREATION Left 1/25/2019    REVISION LEFT UPPER EXTREMITY AV ACCESS WITH LEFT BRACHIAL ARTERY TO LEFT AXILLARY VEIN WITH  INTERPOSITIONAL ARTEGRAFT   performed by Amor Radford MD at [address]  2012    GASTROSTOMY TUBE PLACEMENT N/A 5/10/2022    PERCUTANEOUS ENDOSCOPIC GASTROSTOMY TUBE PLACEMENT, performed by Emily Wallace MD at [address] Right 1/25/2019    INSERTION OF RIGHT INTERNAL JUGULAR HEMODIALYSIS CATHETER performed by Amor Radford MD at [facility]  08/17/2018    1.  Moderately increased stainable iron identified by special stain in Kupffer cells and occasional hepatocytes. Negative for evidence of significant portal or lobular inflammation.  Negative for evidence of significant fibrosis    [place] COLONOSCOPY W/BIOPSY SINGLE/MULTIPLE N/A 10/20/2017    Dr Shahla Silva prep-Tubular AP (-) dysplasia x 2--3 yr recall    [place] EGD TRANSORAL BIOPSY SINGLE/MULTIPLE N/A 2016    Dr Per Vicente EGD TRANSORAL BIOPSY SINGLE/MULTIPLE N/A 10/20/2017    Dr Danay Hernandez (-)    TRACHEOSTOMY  5/10/2022    TRACHEOSTOMY PERCUTANEOUS performed by Bo Feliz MD at [address] VASCULAR SURGERY Left 2016    fistula by Dr Zafar Peralta at [address]  10/02/2017    [facility]. Left upper fistulograms/venograms, balloon angioplasty cephalic vein arch 50V28 conquest.    VASCULAR SURGERY  2018    FISTULOGRAM    VASCULAR SURGERY  10/29/2018    [facility]. Left upper fistulograms/venograms    VASCULAR SURGERY  2018    [facility]. Arch aortogram,left upper arteriograms.  VASCULAR SURGERY  2019    [facility]. Removal of tunneled dialyis catheter right internal jugular vein.  VASCULAR SURGERY  2019    [facility]. Left upper extremity fistulogram including venography to the superior vena cava. Balloon angioplasty left subclavian vein with 10mm x 40mm conquest balloon. Balloon angioplasty mid/proximal upper arm cephalic vein with 78TW x 40mm conquest balloon. Venograms after balloon angioplasty. Stent left mid/proximal upper arm cephalic vein stenosis fluency 10mm x 60mm self-expanding covered stent. Balloon     VASCULAR SURGERY  2019    cont angioplasty stent with 10mm x 40mm conquest balloon. Completion venograms left upper extremity.        Family History  Family History   Problem Relation Age of Onset    Colon Cancer Mother          at 63    Colon Polyps Mother     Lung Cancer Father          at 79    Colon Polyps Father     Stomach Cancer Sister     Breast Cancer Sister 27    Depression Daughter 25        committed suicide    Liver Cancer Neg Hx     Liver Disease Neg Hx     Esophageal Cancer Neg Hx     Rectal Cancer Neg Hx        Social History  Social History     Socioeconomic History    Marital status: Single     Spouse name: Not on file   Huang Number of children: 2    Years of education: Not on file    Highest education level: Not on file   Occupational History    Not on file   Tobacco Use    Smoking status: Current Every Day Smoker     Packs/day: 0.50     Years: 33.00     Pack years: 16.50     Types: Cigarettes    Smokeless tobacco: Never Used    Tobacco comment: NOW at 1/4 PD, started at age 24 and has averaged 2 PPD   Vaping Use    Vaping Use: Never used   Substance and Sexual Activity    Alcohol use: No    Drug use: Not Currently     Types: Marijuana Garon Kettle)    Sexual activity: Not Currently     Partners: Male   Other Topics Concern    Not on file   Social History Narrative    Not on file     Social Determinants of Health     Financial Resource Strain:     Difficulty of Paying Living Expenses: Not on file   Food Insecurity:     Worried About Running Out of Food in the Last Year: Not on file    Kendy of Food in the Last Year: Not on file   Transportation Needs:     Lack of Transportation (Medical): Not on file    Lack of Transportation (Non-Medical):  Not on file   Physical Activity:     Days of Exercise per Week: Not on file    Minutes of Exercise per Session: Not on file   Stress:     Feeling of Stress : Not on file   Social Connections:     Frequency of Communication with Friends and Family: Not on file    Frequency of Social Gatherings with Friends and Family: Not on file    Attends [employer]ism Services: Not on file    Active Member of [address] [place] [address] or Organizations: Not on file    Attends Club or Organization Meetings: Not on file    Marital Status: Not on file   Intimate Partner Violence:     Fear of Current or Ex-Partner: Not on file    Emotionally Abused: Not on file    Physically Abused: Not on file    Sexually Abused: Not on file   Housing Stability:     Unable to Pay for Housing in the Last Year: Not on file    Number of [place] in the Last Year: Not on file    Unstable Housing in the Last Year: Not on file Review of Systems:  History obtained from unobtainable from patient due to mental status        Objective:  Patient Vitals for the past 24 hrs:   BP Temp Temp src Pulse Resp SpO2 Weight   05/25/22 1100 117/65   100 24 100 %    05/25/22 1000 139/71   (!) 101 25 100 %    05/25/22 0900 136/63   99 27 100 %    05/25/22 0801 (!) 165/77 98.9 °F (37.2 °C) Temporal 94 27 99 %    05/25/22 0800    95 28     05/25/22 0600 (!) 167/72   95 30 95 %    05/25/22 0500 (!) 166/64   93 30 100 %    05/25/22 0400 (!) 164/64 98 °F (36.7 °C) Temporal 97 28 97 % 142 lb 1.6 oz (64.5 kg)   05/25/22 0330 (!) 164/70   95 26 98 %    05/25/22 0300 (!) 154/72   94 30 95 %    05/25/22 0200 (!) 161/60   85 26 99 %    05/25/22 0120     28 99 %    05/25/22 0100 (!) 168/67   88 (!) 31 99 %    05/25/22 0000 (!) 155/68 98.4 °F (36.9 °C) Temporal 87 29 96 %    05/24/22 2300 (!) 166/71   91 27 92 %    05/24/22 2230 (!) 147/62   87 (!) 31 92 %    05/24/22 2200 (!) 161/68   85 25 96 %    05/24/22 2128      99 %    05/24/22 2100 (!) 147/62   83 27 96 %    05/24/22 2000 137/60 98.3 °F (36.8 °C) Temporal 84 24 96 %    05/24/22 1900 (!) 142/61   91 25 91 %    05/24/22 1828     26 95 %    05/24/22 1700 (!) 136/57   89 26 90 %    05/24/22 1600 (!) 143/65 98.6 °F (37 °C) Temporal 93 (!) 31 90 %    05/24/22 1500 130/61   93 27 90 %    05/24/22 1400 (!) 114/59   92 26 94 %    05/24/22 1302 (!) 109/56   92 27 91 %    05/24/22 1200 (!) 102/50 98.3 °F (36.8 °C) Temporal 95 (!) 31 93 %        Intake/Output Summary (Last 24 hours) at 5/25/2022 1121  Last data filed at 5/25/2022 0800  Gross per 24 hour   Intake 806 ml   Output 50 ml   Net 756 ml     General: awake /unable to fully participate in examination due to altered mental status  Chest:  clear to auscultation bilaterally  CVS: regular rate and rhythm  Abdominal: soft, nontender, normal bowel sounds  Extremities: no cyanosis or edema  Skin: warm and dry without rash    Labs:  BMP:   Recent Labs     05/23/22  0134 05/24/22 0143 05/25/22  0340   * 135* 132*   K 5.2* 4.3 5.3*   CL 89* 91* 89*   CO2 25 28 24   PHOS 6.1* 4.1 5.8*   BUN 74* 37* 60*   CREATININE 3.2* 1.9* 2.8*   CALCIUM 9.1 8.9 9.0     CBC:   Recent Labs     05/23/22  0134 05/24/22 0143 05/25/22  0340   WBC 8.4 6.3 10.2   HGB 8.1* 8.3* 8.6*   HCT 26.9* 27.2* 27.7*   MCV 95.4 96.1 95.2    312 354     LIVER PROFILE:   Recent Labs     05/23/22  0134 05/24/22 0143 05/25/22  0340   AST 10 139* 61*   ALT 6 70* 57*   BILITOT 0.3 0.3 0.3   ALKPHOS 114* 120* 122*     PT/INR: No results for input(s): PROTIME, INR in the last 72 hours. APTT: No results for input(s): APTT in the last 72 hours. BNP:  No results for input(s): BNP in the last 72 hours. Ionized Calcium:No results for input(s): IONCA in the last 72 hours. Magnesium:  Recent Labs     05/23/22  0134 05/24/22 0143 05/25/22  0340   MG 2.5 2.3 2.7*     Phosphorus:  Recent Labs     05/23/22  0134 05/24/22  0143 05/25/22  0340   PHOS 6.1* 4.1 5.8*     HgbA1C: No results for input(s): LABA1C in the last 72 hours. Hepatic:   Recent Labs     05/23/22  0134 05/24/22 0143 05/25/22  0340   ALKPHOS 114* 120* 122*   ALT 6 70* 57*   AST 10 139* 61*   PROT 5.8* 5.9* 5.9*   BILITOT 0.3 0.3 0.3   LABALBU 2.8* 2.8* 2.8*     Lactic Acid: No results for input(s): LACTA in the last 72 hours. Troponin: No results for input(s): CKTOTAL, CKMB, TROPONINT in the last 72 hours. ABGs:   Lab Results   Component Value Date    PHART 7.620 05/19/2022    PO2ART 127.0 05/19/2022    WSL5IWJ 28.0 05/19/2022     CRP:  No results for input(s): CRP in the last 72 hours. Sed Rate:  No results for input(s): SEDRATE in the last 72 hours. Culture Results:   Blood Culture Recent:   Recent Labs     05/21/22  1901   BC No Growth to date. Any change in status will be called.        Urine Culture Recent :   Recent Labs     05/22/22  [facility] >50,000 CFU/ml  Light growth of Yeast, ID to follow         Radiology reports as per the Radiologist  Radiology: CT Head WO Contrast    Result Date: 4/24/2022  CT HEAD WO CONTRAST 4/24/2022 2:01 PM HISTORY: Altered mental status COMPARISON: CT scan dated 11/18/2021 DOSE LENGTH PRODUCT: 766 mGy cm TECHNIQUE: Helical tomographic images of the brain were obtained without the use of intravenous contrast. Automated exposure control was also utilized to decrease patient radiation dose. FINDINGS: There is no evidence of evolving large vascular territory infarct. No visualized intra-axial or extra-axial hemorrhage. No mass lesion is identified. Normal size and configuration of the ventricular system. The basal cisterns are symmetric. Posterior fossa structures are unremarkable. The included orbits and their contents are unremarkable. Chronic paranasal sinus disease on the left. The visualized osseous structures and overlying soft tissues of the skull and face are unremarkable. 1. No acute intracranial process. Signed by Dr Max Dia    XR CHEST PORTABLE    Result Date: 5/4/2022  XR CHEST PORTABLE 5/4/2022 4:40 AM HISTORY: Mechanical ventilation Comparison: 5/3/2022 Findings: Lines and tubes are stable in position. Minimal bibasilar atelectasis. Lungs are otherwise clear. No pleural effusion or pneumothorax. The cardiomediastinal silhouette and pulmonary vascularity are unchanged. No acute osseous or soft tissue abnormality is noted. Impression: 1. Minimal bibasilar atelectasis. Otherwise stable. Signed by Dr Max Dia    XR CHEST PORTABLE    Result Date: 5/3/2022  XR CHEST PORTABLE 5/3/2022 4:03 PM HISTORY: Postintubation film  Technique: Single AP view of the chest COMPARISONS: 4/24/2022 FINDINGS: Endotracheal tube is identified with tip approximately 2.2 cm above the krysta. Lungs are clear and well expanded. No pleural effusion or pneumothorax. Heart size is normal. Pulmonary vasculature are nondilated. Enteric tube is in good position with tip projecting over the gastric antrum. No acute bony abnormality. Left subclavian region vascular stent. 1. Status post endotracheal intubation with tube tip approximately 2.2 cm above the krysta. Lungs are clear and well expanded. 2. Enteric tube is in good position. Signed by Dr Mirna Blackmon    Result Date: 4/24/2022  XR CHEST PORTABLE 4/24/2022 4:27 PM HISTORY: ET tube, enteric tube  Technique: Single AP view of the chest COMPARISONS: 4/24/2022 FINDINGS: Endotracheal tube is identified with tip essentially touching the krysta. Enteric tube courses into the stomach with tip curled in the fundus back towards the gastroesophageal junction. The lungs are clear and well expanded. Heart size is stable. Pulmonary vasculature are nondilated. No pleural effusion or pneumothorax. 1. Endotracheal tube is deep, tip essentially touching the krysta. Lungs are clear and well expanded. I would recommend 2-3 cm withdrawal for a more optimal positioning. The results of this exam were discussed with Angely Best (ICU nursing) on 4/24/2022 at 1640 hours Signed by Dr Edis Nguyen    XR CHEST PORTABLE    Result Date: 4/24/2022  XR CHEST PORTABLE 4/24/2022 1:17 PM HISTORY: ET tube placement  Technique: Single AP view of the chest COMPARISONS: 4/24/2022 FINDINGS: Status post endotracheal intubation. ET tube is in good position. Lungs are well-expanded. Enteric tube curls on itself in the midesophagus and then extends back up into the throat. Heart size is stable. Pulmonary vasculature are nondilated. 1. ET tube is in good position. Lungs are clear and well expanded. 2. Enteric tube curls on itself in the mid esophagus before extending back up into the throat. This will need repositioned.  The results of this exam were discussed with Dr. Lawrence Farrar on 4/24/2022 at 1329 hours Signed by Dr Edis Nguyen    XR CHEST PORTABLE    Result Date: 4/24/2022  XR CHEST PORTABLE 4/24/2022 12:34 PM HISTORY: Altered mental status  Technique: Single AP view of the chest COMPARISONS: Chest exam dated 4/1/2022 FINDINGS: No lung consolidation. No pleural effusion or pneumothorax. Cardiomediastinal silhouette and pulmonary vasculature are unremarkable. No acute bony abnormality. Left subclavian region vascular stent with additional stent projecting over the left humerus. No acute cardiopulmonary process. Signed by Dr Julien Martinez    VL DUP CAROTID BILATERAL    Result Date: 4/29/2022  Vascular Carotid Procedure  Demographics   Patient Name    Matt Aviles Age                   47   Patient Number  [id number]       Gender                Female   Visit Number    [id number]    Interpreting          Valerie Stauffer                               Physician   Date of Birth   1970   Referring Physician   Ivonne Yeung   Accession       [id number]   [address], [place], [address]  Number  Procedure Type of Study:   Cerebral:Carotid, VL CAROTID BILATERAL. Indications for Study:Stroke. Risk Factors   - The patient's risk factor(s) include: diabetes mellitus and arterial     hypertension.   - Current - Every day. Allergies   - Penicillins.   - Stadol.   - Other allergy:(Lamisil Advanced). Technical Quality:Limited visualization due to patient on ventilator. Impression   There is mixed plaque visualized in the bilateral internal carotid  artery(ies). There is less than 50% stenosis in the right internal carotid artery. There is less than 50% stenosis of the left internal carotid artery. There is normal antegrade flow in the bilateral vertebral artery(ies). right side is very limited due to positioning, her chin is resting on her  right clavicle and she is vented.    Signature   ----------------------------------------------------------------  Electronically signed by Dave Leon(Interpreting  physician) on 04/29/2022 03:20 PM ----------------------------------------------------------------  Blood Pressure:Right arm 178/69 mmHg. Velocities are measured in cm/s ; Diameters are measured in mm Carotid Right Measurements +------------+-------+-------+--------+-------+------------+---------------+ ! Location    ! PSV    ! EDV    ! Angle   ! RI     !%Stenosis   ! Tortuosity     ! +------------+-------+-------+--------+-------+------------+---------------+ ! Prox CCA    !47.2   !       !60      !       !            !               ! +------------+-------+-------+--------+-------+------------+---------------+ ! Mid CCA     !47.2   !       !60      !       !            !               ! +------------+-------+-------+--------+-------+------------+---------------+ ! Prox ICA    !41     !7.46   !60      !0.82   !            !               ! +------------+-------+-------+--------+-------+------------+---------------+ ! Mid ICA     !62.7   !11.2   !60      !0.82   !            !               ! +------------+-------+-------+--------+-------+------------+---------------+ ! Dist ICA    !75.2   !13.7   !60      !0.82   !            !               ! +------------+-------+-------+--------+-------+------------+---------------+ ! Prox ECA    !147    !       !61      !       !            !               ! +------------+-------+-------+--------+-------+------------+---------------+ ! Vertebral   !47.2   !13.7   !60      !0.71   !            !               ! +------------+-------+-------+--------+-------+------------+---------------+   - There is antegrade vertebral flow noted on the right side. - Additional Measurements:ICAPSV/CCAPSV 1.59. Carotid Left Measurements +------------+-------+-------+--------+-------+------------+---------------+ ! Location    ! PSV    ! EDV    ! Angle   ! RI     !%Stenosis   ! Tortuosity     ! +------------+-------+-------+--------+-------+------------+---------------+ ! Prox CCA    !81.4   !9.94   !60      !0.88   !            !               ! +------------+-------+-------+--------+-------+------------+---------------+ ! Mid CCA     !70.2   !7.46   !60      !0.89   !            !               ! +------------+-------+-------+--------+-------+------------+---------------+ ! Prox ICA    !69.4   !11.9   !60      !0.83   !            !               ! +------------+-------+-------+--------+-------+------------+---------------+ ! Mid ICA     !80.6   !19.6   !60      !0.76   !            !               ! +------------+-------+-------+--------+-------+------------+---------------+ ! Dist ICA    !70.3   !12.5   !50      !0.82   !            !               ! +------------+-------+-------+--------+-------+------------+---------------+ ! Prox ECA    !113    !       !61      !       !            !               ! +------------+-------+-------+--------+-------+------------+---------------+ ! Vertebral   !38.8   !       !60      !       !            !               ! +------------+-------+-------+--------+-------+------------+---------------+   - There is antegrade vertebral flow noted on the left side. - Additional Measurements:ICAPSV/CCAPSV 0.99. ICAEDV/CCAEDV 1.97. MRI BRAIN WO CONTRAST    Result Date: 4/29/2022  Examination. MRI BRAIN WO CONTRAST 4/29/2022 11:43 AM History: Seizure activity. The multiplanar, multisequence MR imaging of the brain is performed without contrast enhancement. There is no previous similar study for comparison. The correlation made with CT scan of the head dated 4/24/2022. The diffusion-weighted imaging sequence including the ADC map show an area of acute infarction involving the left posterior frontal, parietal and basal occipital lobe, the left middle cerebral artery territory. A small linear focus of T2 signal in the right posterior paraventricular white matter has no corresponding ADC map changes and may represent a subacute or chronic process.  The targeted images of the mesial temporal lobe show moderate symmetrical mesial temporal atrophy without presence of any abnormal signal. This may be a process of general volume loss/atrophy. Moderately dilated ventricles, basal cisterns and the cortical sulci suggestive chronic volume loss/atrophy. The orbits are suboptimally evaluated. No obvious abnormality. Limited visualized optic nerve, optic chiasma and optic tracts are normal and symmetrical. The pituitary gland appears normal. The corpus callosum, the brainstem and cerebellum are normal. The FLAIR sequence images show significant periventricular T2 signal changes representing chronic white matter ischemia and transependymal CSF flow. The gradient recalled imaging sequence show normal flow void in the visualized intracranial vessels, particularly normal flow in the limited visualized left middle cerebral artery. There is mucosal thickening involving the ethmoid sinuses. The bilateral mastoid effusion. 1. Acute left middle cerebral territory infarct. 2. Chronic ischemic and atrophic changes. 3. The symmetrical mesial temporal atrophy is probably a part of the generalized volume loss. 4. Ethmoid sinusitis. Bilateral mastoid effusion. The above report was immediately called to the nurse in charge of the patient in CCU. Signed by Dr Juani Santana    ECHO 2D Debbie Gut    Result Date: 4/29/2022  Transthoracic Echocardiography Report (TTE)  Demographics   Patient Name  [facility] Date of Study          04/29/2022   MRN           [id number]       Gender                 Female   Date of Birth 1970   Room Number            MHL-0702   Age           47 year(s)   Height:       66 inches    Referring Physician    Mariposa Kilgore   Weight:       144 pounds   Sonographer            Jessie Madera CLARICE   BSA:          1.74 m^2     Interpreting Physician Soco Gaston MD   BMI:          23.24 kg/m^2  Procedure Type of Study   TTE procedure:ECHO NO CONTRAST WITH DOP/COLR.   Study Location: Portable Technical Quality: Adequate visualization Patient Status: Inpatient Contrast Medium: Bubble Study. Rhythm: Within normal limits HR: 57 bpm BP: 178/69 mmHg Indications:Stroke. Conclusions   Summary  Mitral valve leaflets are mildly thickened with preserved leaflet  mobility. Mild aortic stenosis. Mean gradient 14 mm hg  Tricuspid valve is structurally normal.  Mild tricuspid regurgitation. Moderately dilated left atrium. Normal intact intra-atrial septum was noted with no evidence of  significant intra-atrial communications by color flow Doppler or by  agitated saline study. Normal left ventricular size with preserved LV function and an estimated  ejection fraction of approximately 55-60%. Moderate concentric left ventricular hypertrophy. No regional wall motion abnormalities. Grade II diastolic dysfunction   Signature   ----------------------------------------------------------------  Electronically signed by Mckenzie Peralta MD(Interpreting  physician) on 04/30/2022 11:02 AM  ----------------------------------------------------------------   Findings   Mitral Valve  Mitral valve leaflets are mildly thickened with preserved leaflet  mobility. Aortic Valve  Mild aortic stenosis. Mean gradient 14 mm hg   Tricuspid Valve  Tricuspid valve is structurally normal.  Mild tricuspid regurgitation. Pulmonic Valve  The pulmonic valve was not well visualized . Left Atrium  Moderately dilated left atrium. Normal intact intra-atrial septum was noted with no evidence of  significant intra-atrial communications by color flow Doppler or by  agitated saline study. Left Ventricle  Normal left ventricular size with preserved LV function and an estimated  ejection fraction of approximately 55-60%. Moderate concentric left ventricular hypertrophy. No regional wall motion abnormalities. Grade II diastolic dysfunction   Right Atrium  Normal right atrial dimension with no evidence of thrombus or mass noted.    Right Ventricle  Normal right ventricular size with

## 2022-05-25 NOTE — CARE COORDINATION
5/25/22: LTACH received approval on precert;  Pt can transfer today; bed offer letter received and faxed to ICU   Electronically signed by RUBEN Enriquez on 5/25/2022 at 1:55 PM

## 2022-05-25 NOTE — PROGRESS NOTES
Patient being transferred to [facility]. All necessary paperwork faxed. Report called to [facility]. [facility] notified that patient is ready for transport to other facility.  Belongings have been packed and will be sent with EMS to other facility

## 2022-05-25 NOTE — PROGRESS NOTES
Hospitalist Progress Note  [facility]     Patient: Sudarshan Arriaga  : 1970  MRN: [id number]  Code Status: Full Code    Hospital Day: 31   Date of Service: 2022    Subjective:   Patient seen and examined. Trached.     Past Medical History:   Diagnosis Date    Acute ischemic stroke ([facility] 2022    Arthritis     Chronic kidney disease, stage 5, kidney failure (HCC)     Closed fracture of right shoulder girdle, with routine healing, subsequent encounter     DM (diabetes mellitus) type II controlled with renal manifestation ([facility] 1993    Gastroparesis     GERD (gastroesophageal reflux disease)     Hemodialysis patient ([facility]     dialysis on tues, thur, and sat at [facility]    Hypertension     Hypothyroid     Nasal congestion     recent    Neuropathy     Noncompliance with medications     Palliative care patient 10/08/2019    Restless leg syndrome        Past Surgical History:   Procedure Laterality Date    BREAST SURGERY Left     infected milk gland removed    BREAST SURGERY Right 2021    WEDGE EXCISION RIGHT BREAST DUCT WITH LACRIMAL DUCT PROBE, PEC BLOCK performed by Hodan Harris MD at [address] [place] [address], [facility]          COLONOSCOPY Left 2020    Dr Edvin Chappell hepatic flexure-Severe tortuosity with severe spasming, 1 yr recall    DIALYSIS FISTULA CREATION Left 2019    REVISION LEFT UPPER EXTREMITY AV ACCESS WITH LEFT BRACHIAL ARTERY TO LEFT AXILLARY VEIN WITH  INTERPOSITIONAL ARTEGRAFT   performed by Abbie Aguirre MD at [address]      GASTROSTOMY TUBE PLACEMENT N/A 5/10/2022    PERCUTANEOUS ENDOSCOPIC GASTROSTOMY TUBE PLACEMENT, performed by Kiesha Craig MD at [address] Right 2019    INSERTION OF RIGHT INTERNAL JUGULAR HEMODIALYSIS CATHETER performed by Abbie Aguirre MD at [facility]  2018    1.  Moderately increased stainable iron identified by special stain in Kupffer cells and occasional hepatocytes. Negative for evidence of significant portal or lobular inflammation. Negative for evidence of significant fibrosis    ND COLONOSCOPY W/BIOPSY SINGLE/MULTIPLE N/A 10/20/2017    Dr Dale Flores prep-Tubular AP (-) dysplasia x 2--3 yr recall    ND EGD TRANSORAL BIOPSY SINGLE/MULTIPLE N/A 2016    Dr Karl Smith EGD TRANSORAL BIOPSY SINGLE/MULTIPLE N/A 10/20/2017    Dr Roderick Ritchie (-)    TRACHEOSTOMY  5/10/2022    TRACHEOSTOMY PERCUTANEOUS performed by Chris Joe MD at [address] VASCULAR SURGERY Left 2016    fistula by Dr Raquel De Luna at [address]  10/02/2017    SJS. Left upper fistulograms/venograms, balloon angioplasty cephalic vein arch [id number] conquest.    VASCULAR SURGERY  2018    FISTULOGRAM    VASCULAR SURGERY  10/29/2018    SJS. Left upper fistulograms/venograms    VASCULAR SURGERY  2018    SJS. Arch aortogram,left upper arteriograms.  VASCULAR SURGERY  2019    SJS. Removal of tunneled dialyis catheter right internal jugular vein.  VASCULAR SURGERY  2019    SJS. Left upper extremity fistulogram including venography to the superior vena cava. Balloon angioplasty left subclavian vein with 10mm x 40mm conquest balloon. Balloon angioplasty mid/proximal upper arm cephalic vein with 50ZJ x 40mm conquest balloon. Venograms after balloon angioplasty. Stent left mid/proximal upper arm cephalic vein stenosis fluency 10mm x 60mm self-expanding covered stent. Balloon     VASCULAR SURGERY  2019    cont angioplasty stent with 10mm x 40mm conquest balloon. Completion venograms left upper extremity.        Family History   Problem Relation Age of Onset    Colon Cancer Mother          at 63    Colon Polyps Mother     Lung Cancer Father          at 66    Colon Polyps Father     Stomach Cancer Sister     Breast Cancer Sister 27    Depression Daughter 25        committed suicide    Liver Cancer Neg Hx     Liver Disease Neg Hx     Esophageal Cancer Neg Hx     Rectal Cancer Neg Hx        Social History     Socioeconomic History    Marital status: Single     Spouse name: Not on file    Number of children: 2    Years of education: Not on file    Highest education level: Not on file   Occupational History    Not on file   Tobacco Use    Smoking status: Current Every Day Smoker     Packs/day: 0.50     Years: 33.00     Pack years: 16.50     Types: Cigarettes    Smokeless tobacco: Never Used    Tobacco comment: NOW at 1/4 PD, started at age 24 and has averaged 2 PPD   Vaping Use    Vaping Use: Never used   Substance and Sexual Activity    Alcohol use: No    Drug use: Not Currently     Types: Marijuana Delia Winslow)    Sexual activity: Not Currently     Partners: Male   Other Topics Concern    Not on file   Social History Narrative    Not on file     Social Determinants of Health     Financial Resource Strain:     Difficulty of Paying Living Expenses: Not on file   Food Insecurity:     Worried About Running Out of Food in the Last Year: Not on file    Kendy of Food in the Last Year: Not on file   Transportation Needs:     Lack of Transportation (Medical): Not on file    Lack of Transportation (Non-Medical):  Not on file   Physical Activity:     Days of Exercise per Week: Not on file    Minutes of Exercise per Session: Not on file   Stress:     Feeling of Stress : Not on file   Social Connections:     Frequency of Communication with Friends and Family: Not on file    Frequency of Social Gatherings with Friends and Family: Not on file    Attends [employer] Services: Not on file    Active Member of Clubs or Organizations: Not on file    Attends Club or Organization Meetings: Not on file    Marital Status: Not on file   Intimate Partner Violence:     Fear of Current or Ex-Partner: Not on file    Emotionally Abused: Not on file    Physically Abused: Not on file   Diamond Masterson Sexually Abused: Not on file   Housing Stability:     Unable to Pay for Housing in the Last Year: Not on file    Number of Places Lived in the Last Year: Not on file    Unstable Housing in the Last Year: Not on file       Current Facility-Administered Medications   Medication Dose Route Frequency Provider Last Rate Last Admin    vancomycin (VANCOCIN) 750 mg in dextrose 5 % 250 mL IVPB  750 mg IntraVENous Once Mikey Abraham DO        lisinopril (PRINIVIL;ZESTRIL) tablet 20 mg  20 mg Per NG tube BID Gabriela Fournier MD   20 mg at 05/24/22 1913    busPIRone (BUSPAR) tablet 5 mg  5 mg Oral TID Jeanna Pabon MD   5 mg at 05/25/22 0900    LORazepam (ATIVAN) injection 0.5 mg  0.5 mg IntraVENous Q6H PRN Norma Brunson DO   0.5 mg at 05/22/22 0328    insulin glargine (LANTUS) injection vial 8 Units  8 Units SubCUTAneous Nightly Jeanna Pabon MD   8 Units at 05/24/22 2003    sodium zirconium cyclosilicate (LOKELMA) oral suspension 10 g  10 g Oral TID PRN Gabriela Fournier MD        sevelamer (RENVELA) packet 0.8 g  0.8 g Oral TID  Jeanna Pabon MD   0.8 g at 05/25/22 0859    insulin lispro (HUMALOG) injection vial 0-6 Units  0-6 Units SubCUTAneous TID  Jeanna Pabon MD   3 Units at 05/25/22 0744    insulin lispro (HUMALOG) injection vial 0-3 Units  0-3 Units SubCUTAneous Nightly Jeanna Pabon MD   2 Units at 05/24/22 2002    levETIRAcetam (KEPPRA) 100 MG/ML solution 500 mg  500 mg Per G Tube BID Rober Crigler, MD   500 mg at 05/25/22 0859    atorvastatin (LIPITOR) tablet 20 mg  20 mg Oral Daily Jeanna Pabon MD   20 mg at 05/25/22 0900    rOPINIRole (REQUIP) tablet 4 mg  4 mg Oral Nightly Jeanna Pabon MD   4 mg at 05/24/22 1913    NIFEdipine (PROCARDIA XL) extended release tablet 90 mg  90 mg Oral Daily Jeanna Pabon MD   90 mg at 05/24/22 0843    metoprolol succinate (TOPROL XL) extended release tablet 100 mg  100 mg Oral MUNA Pabon MD   100 mg at 05/24/22 7029    levothyroxine (SYNTHROID) tablet 150 mcg  150 mcg Oral Daily Kay Singh MD   150 mcg at 05/25/22 0601    isosorbide mononitrate (IMDUR) extended release tablet 120 mg  120 mg Oral Daily Kay Singh MD   120 mg at 05/24/22 0843    DULoxetine (CYMBALTA) extended release capsule 60 mg  60 mg Oral Daily Kay Singh MD   60 mg at 05/25/22 0900    collagenase ointment   Topical Daily Kay Singh MD   Given at 05/24/22 0845    sodium chloride flush 0.9 % injection 5-40 mL  5-40 mL IntraVENous 2 times per day Ying Fields MD   10 mL at 05/24/22 1914    sodium chloride flush 0.9 % injection 5-40 mL  5-40 mL IntraVENous PRN Ying Fields MD        0.9 % sodium chloride infusion   IntraVENous PRN Ying Fields MD        vancomycin [facility]) intermittent dosing (placeholder)   Other RX Margret Rosado MD        aminoglycoside intermittent dosing (placeholder)   Other RX Placeholder Kurtis Day MD        acetylcysteine (MUCOMYST) 20 % solution 600 mg  600 mg Inhalation BID Kurtis Day MD   600 mg at 05/25/22 3657    ipratropium-albuterol (DUONEB) nebulizer solution 1 ampule  1 ampule Inhalation Q4H Kurtis Day MD   1 ampule at 05/25/22 3300    hydrALAZINE (APRESOLINE) injection 10 mg  10 mg IntraVENous Q6H PRN Kurtis Day MD   10 mg at 05/25/22 0206    aspirin chewable tablet 81 mg  81 mg Oral Daily Kurtis Day MD   81 mg at 05/25/22 0900    racepinephrine HCl (VAPONEFPRIN) 2.25 % nebulizer solution NEBU 11.25 mg  11.25 mg Nebulization Q4H PRN Kurtis Day MD   11.25 mg at 05/03/22 1408    sodium chloride nebulizer 0.9 % solution 3 mL  3 mL Nebulization Q4H PRN Kurtis Day MD        heparin (porcine) injection 5,000 Units  5,000 Units SubCUTAneous TID Kurtis Day MD   5,000 Units at 05/25/22 9326    Peppermint Spirit SPRT   Does not apply PRN Kurtis Day MD        menthol-zinc oxide (CALMOSEPTINE) 0.44-20.6 % ointment   Topical BID Kay Singh MD   Given at 05/25/22 0604    sodium phosphate 10.5 mmol in sodium chloride 0.9 % 250 mL IVPB  0.16 mmol/kg IntraVENous PRN Denilson Zaragoza MD        Or    sodium phosphate 21 mmol in sodium chloride 0.9 % 250 mL IVPB  0.32 mmol/kg IntraVENous PRN Denilson Zaragoza MD        hydrALAZINE (APRESOLINE) tablet 100 mg  100 mg Oral 3 times per day Denilson Zaragoza MD   100 mg at 05/25/22 0601    glucagon (rDNA) injection 1 mg  1 mg IntraMUSCular PRN Denilson Zaragoza MD        dextrose 5 % solution  100 mL/hr IntraVENous PRN Denilson aZragoza MD        glucose chewable tablet 16 g  4 tablet Oral PRN Denilson Zaragoza MD        dextrose bolus (hypoglycemia) 10% 125 mL  125 mL IntraVENous PRN Denilson Zaragoza MD   Stopped at 05/11/22 2016    Or    dextrose bolus (hypoglycemia) 10% 250 mL  250 mL IntraVENous PRN Denilson Zaragoza MD   Stopped at 05/10/22 2026    LORazepam (ATIVAN) injection 1 mg  1 mg IntraVENous Q5 Min PRN Denilson Zaragoza MD        ondansetron (ZOFRAN-ODT) disintegrating tablet 4 mg  4 mg Oral Q8H PRN Denilson Zaragoza MD        Or    ondansetron [facility]) injection 4 mg  4 mg IntraVENous Q6H PRN Denilson Zaragoza MD        polyethylene glycol [facility]) packet 17 g  17 g Oral Daily PRN Denilson Zaragoza MD        acetaminophen (TYLENOL) tablet 650 mg  650 mg Oral Q6H PRN Denilson Zaragoza MD   650 mg at 05/21/22 1627    Or    acetaminophen (TYLENOL) suppository 650 mg  650 mg Rectal Q6H PRN Denilson Zaragoza MD        chlorhexidine (PERIDEX) 0.12 % solution 15 mL  15 mL Mouth/Throat BID Denilson Zaragoza MD   15 mL at 05/24/22 1917    famotidine (PEPCID) 20 mg in sodium chloride (PF) 10 mL injection  20 mg IntraVENous Daily Denilson Zaragoza MD   20 mg at 05/25/22 0043    magic butt cream   Topical Q4H PRN Denilson Zaragoza MD   Given at 05/03/22 0940    magnesium sulfate 1000 mg in dextrose 5% 100 mL IVPB  1,000 mg IntraVENous PRN Denilson Zaragoza MD        potassium bicarb-citric acid (EFFER-K) effervescent tablet 40 mEq  40 mEq Oral PRN Ky Gupta MD        Or    potassium chloride 10 mEq/100 mL IVPB (Peripheral Line)  10 mEq IntraVENous PRN Ky Gupta MD   Stopped at 04/25/22 1205         sodium chloride      dextrose          Objective:   /71   Pulse (!) 101   Temp 98.9 °F (37.2 °C) (Temporal)   Resp 25   Ht 5' 6\" (1.676 m)   Wt 142 lb 1.6 oz (64.5 kg)   SpO2 100%   BMI 22.94 kg/m²     General: no acute distress  HEENT: normocephalic, atraumatic  Lungs: faint rhonchi  Cardiovascular: s1 and s2 normal  Abdomen: soft, positive bowel sounds  Extremities: no cyanosis   Neuro: trached    Recent Labs     05/23/22 0134 05/24/22 0143 05/25/22  0340   WBC 8.4 6.3 10.2   RBC 2.82* 2.83* 2.91*   HGB 8.1* 8.3* 8.6*   HCT 26.9* 27.2* 27.7*   MCV 95.4 96.1 95.2   MCH 28.7 29.3 29.6   MCHC 30.1* 30.5* 31.0*    312 354     Recent Labs     05/23/22 0134 05/24/22 0143 05/25/22  0340   * 135* 132*   K 5.2* 4.3 5.3*   ANIONGAP 18 16 19   CL 89* 91* 89*   CO2 25 28 24   BUN 74* 37* 60*   CREATININE 3.2* 1.9* 2.8*   GLUCOSE 144* 227* 210*   CALCIUM 9.1 8.9 9.0     Recent Labs     05/23/22 0134 05/24/22 0143 05/25/22  0340   MG 2.5 2.3 2.7*   PHOS 6.1* 4.1 5.8*     Recent Labs     05/23/22 0134 05/24/22 0143 05/25/22  0340   AST 10 139* 61*   ALT 6 70* 57*   BILITOT 0.3 0.3 0.3   ALKPHOS 114* 120* 122*     No results for input(s): PH, PO2, PCO2, HCO3, BE, O2SAT in the last 72 hours. No results for input(s): TROPONINI in the last 72 hours. No results for input(s): INR in the last 72 hours. No results for input(s): LACTA in the last 72 hours. Intake/Output Summary (Last 24 hours) at 5/25/2022 1024  Last data filed at 5/25/2022 0800  Gross per 24 hour   Intake 806 ml   Output 50 ml   Net 756 ml       No results found.      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 Yandel Maria  ESRD on HD  Renal following     Medical noncompliance       C. difficile infection  Completed oral vancomycin course     Tobacco dependence       IDDM 2  Meds on board     DVT prophylaxis  Heparin     Dispo:  LTAC eval in progress    Dom Lee MD   5/25/2022 10:24 AM

## 2022-05-25 NOTE — PROGRESS NOTES
[facility] Neurology Progress Note      Patient:   Reed Cueto  MR#:    [id number]   Room:    2773/190-16   YOB: 1970  Date of Progress Note: 5/25/2022  Time of Note                           8:06 AM  Consulting Physician:  Jessica Camarena DO  Attending Physician:  Juanis Diamond MD      INTERVAL HISTORY:  No acute events, still not following commands, a little more lethargic this am, but overall appears largely unchanged overnight. REVIEW OF SYSTEMS:  Unable to obtain     PHYSICAL EXAM:    Constitutional    BP (!) 165/77   Pulse 94   Temp 98.9 °F (37.2 °C) (Temporal)   Resp 27   Ht 5' 6\" (1.676 m)   Wt 142 lb 1.6 oz (64.5 kg)   SpO2 99%   BMI 22.94 kg/m²   General appearance: Intubated, sedated   EYES -   Conjunctiva normal  Pupillary exam as below, see CN exam in the neurologic exam  ENT-    No scars, masses, or lesions over external nose or ears  Oropharynx - intubated  Cardiovascular -   No clubbing, cyanosis  Pulmonary-   Mechanically ventilated   Musculoskeletal    No significant wasting of muscles noted  Gait as below, see gait exam in the neurologic exam  Muscle strength, tone, stability as below see the motor exam in the neurologic exam.   No bony deformities  Skin    Warm, dry, and intact to inspection and palpation. No rash, erythema, or pallor        NEUROLOGICAL EXAM     Mental status    []? Awake, alert, oriented   []? Affect attention and concentration appear appropriate  []? Recent and remote memory appears unremarkable  []? Speech normal without dysarthria or aphasia, comprehension and repetition intact. COMMENTS:  Not following commands, does appear to localize to pain at times and questionable tracking at times, right sided neglect. Cranial Nerves []? No VF deficit to confrontation,  optic discs normal, no papilledema on fundoscopic exam.  []? PERRLA, EOMI, no nystagmus, conjugate eye movements, no ptosis  []? Face symmetric  []?  Facial sensation intact  []? Tongue midline no atrophy or fasciculations present  []? Palate midline, hearing to finger rub normal  []? Shoulder shrug and SCM testing normal  COMMENTS:  PERRL, Face appears sym    Motor   []? 5/5 strength x 4 extremities  [x]? Normal bulk and tone  [x]? No tremor present  []? No rigidity or bradykinesia noted  COMMENTS: Withdrawal Right side   Sensory  []? Sensation intact to light touch, pin prick, vibration, and proprioception BLE  []? Sensation intact to light touch, pin prick, vibration, and proprioception BUE  COMMENTS: Limited, right sided neglect noted     Coordination []? FTN normal bilaterally   []? HTS normal bilaterally  []? SANFORD normal.   COMMENTS: Limited    Reflexes  [x]? Symmetric and non-pathological  [x]? Toes downgoing bilaterally  [x]? No clonus present  COMMENTS:   Gait                  []? Normal steady gait    []? Ataxic    []? Spastic     []? Magnetic     []? Shuffling  [x]? Not assessed  COMMENTS:        LABS/IMAGING:    CT Head WO Contrast    Result Date: 4/24/2022  CT HEAD WO CONTRAST 4/24/2022 2:01 PM HISTORY: Altered mental status COMPARISON: CT scan dated 11/18/2021 DOSE LENGTH PRODUCT: 766 mGy cm TECHNIQUE: Helical tomographic images of the brain were obtained without the use of intravenous contrast. Automated exposure control was also utilized to decrease patient radiation dose. FINDINGS: There is no evidence of evolving large vascular territory infarct. No visualized intra-axial or extra-axial hemorrhage. No mass lesion is identified. Normal size and configuration of the ventricular system. The basal cisterns are symmetric. Posterior fossa structures are unremarkable. The included orbits and their contents are unremarkable. Chronic paranasal sinus disease on the left. The visualized osseous structures and overlying soft tissues of the skull and face are unremarkable. 1. No acute intracranial process.  Signed by Dr Ashley Marie    XR CHEST PORTABLE    Result Date: 4/24/2022  XR CHEST PORTABLE 4/24/2022 4:27 PM HISTORY: ET tube, enteric tube  Technique: Single AP view of the chest COMPARISONS: 4/24/2022 FINDINGS: Endotracheal tube is identified with tip essentially touching the krysta. Enteric tube courses into the stomach with tip curled in the fundus back towards the gastroesophageal junction. The lungs are clear and well expanded. Heart size is stable. Pulmonary vasculature are nondilated. No pleural effusion or pneumothorax. 1. Endotracheal tube is deep, tip essentially touching the krysta. Lungs are clear and well expanded. I would recommend 2-3 cm withdrawal for a more optimal positioning. The results of this exam were discussed with Cherelle Cuellar (ICU nursing) on 4/24/2022 at 1640 hours Signed by Dr Julien Martinez    XR CHEST PORTABLE    Result Date: 4/24/2022  XR CHEST PORTABLE 4/24/2022 1:17 PM HISTORY: ET tube placement  Technique: Single AP view of the chest COMPARISONS: 4/24/2022 FINDINGS: Status post endotracheal intubation. ET tube is in good position. Lungs are well-expanded. Enteric tube curls on itself in the midesophagus and then extends back up into the throat. Heart size is stable. Pulmonary vasculature are nondilated. 1. ET tube is in good position. Lungs are clear and well expanded. 2. Enteric tube curls on itself in the mid esophagus before extending back up into the throat. This will need repositioned. The results of this exam [facility] at 1329 hours Signed by Dr Julien Martinez    XR CHEST PORTABLE    Result Date: 4/24/2022  XR CHEST PORTABLE 4/24/2022 12:34 PM HISTORY: Altered mental status  Technique: Single AP view of the chest COMPARISONS: Chest exam dated 4/1/2022 FINDINGS: No lung consolidation. No pleural effusion or pneumothorax. Cardiomediastinal silhouette and pulmonary vasculature are unremarkable. No acute bony abnormality.  Left subclavian region vascular stent with additional stent projecting over the left humerus. No acute cardiopulmonary process. Signed by Dr Michael Mayes      Lab Results   Component Value Date    WBC 10.2 05/25/2022    HGB 8.6 (L) 05/25/2022    HCT 27.7 (L) 05/25/2022    MCV 95.2 05/25/2022     05/25/2022     Lab Results   Component Value Date     (L) 05/25/2022    K 5.3 (H) 05/25/2022    CL 89 (L) 05/25/2022    CO2 24 05/25/2022    BUN 60 (H) 05/25/2022    CREATININE 2.8 (H) 05/25/2022    GLUCOSE 210 (H) 05/25/2022    CALCIUM 9.0 05/25/2022    PROT 5.9 (L) 05/25/2022    LABALBU 2.8 (L) 05/25/2022    BILITOT 0.3 05/25/2022    ALKPHOS 122 (H) 05/25/2022    AST 61 (H) 05/25/2022    ALT 57 (H) 05/25/2022    LABGLOM 18 (A) 05/25/2022    GFRAA 21 (L) 05/25/2022     Lab Results   Component Value Date    INR 1.13 05/20/2022    INR 1.13 05/19/2022    INR 1.09 05/18/2022    PROTIME 14.5 05/20/2022    PROTIME 14.5 05/19/2022    PROTIME 14.1 05/18/2022       RECORD REVIEW:   Previous medical records, medications were reviewed at today's visit. Nursing/physician notes, imaging, labs and vitals reviewed. ASSESSMENT:  47 y.o. admitted after an episode of unresponsiveness followed by generalized tonic-clonic event in the emergency department. She had profoundly elevated glucose, hyponatremia, underlying UTI,  end-stage renal disease with a history of non-compliance noted. MRI with large scattered left MCA distribution stroke. ECHO negative from a cardioembolic standpoint. Trach and PEG in place. Exam remains largely unchanged overnight, right sided weakness, likely global aphasia, and right stewart-neglect noted, doing about the same. No clinical seizure activity noted overnight, largely unchanged.       PLAN:  1. Continue ASA, supportive care. 2.  Continue Keppra  3. Continue addressing respiratory failure - now off vent, electrolyte and infectious issues, ESRD nephrology following  4. Limit sedating medications   5.   Palliative care following      Please feel free to call with any questions. [phone] (cell phone).       Jagdish Cardona DO  Board Certified Neurology

## 2022-05-26 LAB
BLOOD CULTURE, ROUTINE: NORMAL
CULTURE, BLOOD 2: NORMAL

## 2022-05-26 NOTE — ADT AUTH CERT
Utilization Reviews         Seizure - Care Day 29 (5/22/2022) by Waldemar Chang RN       Review Status Review Entered   Completed 5/24/2022 14:43      Criteria Review      Care Day: 29 Care Date: 5/22/2022 Level of Care: ICU    Guideline Day 2    Level Of Care    (X) Neurologic unit or floor to discharge    5/24/2022 3:43 PM EDT by Marce Rizvi      icu    Clinical Status    (X) * Hemodynamic stability    5/24/2022 3:43 PM EDT by Marce Rizvi      bp: 170/68, pulse 84    ( ) * Mental status at baseline    (X) * No evidence of CNS bleeding or infection    5/24/2022 3:43 PM EDT by Marce Rizvi      no cns bleeding or infection noted    ( ) * No new neurologic deficits    (X) * Seizures absent or managed    5/24/2022 3:43 PM EDT by Marce Rizvi      no seizures noted    ( ) * No evidence of seizure etiology requiring inpatient care    ( ) * Discharge plans and education understood    Activity    ( ) * Ambulatory or acceptable for next level of care    Routes    ( ) * Oral hydration    ( ) * Oral medications or regimen acceptable for next level of care    ( ) * Oral diet or acceptable for next level of care    Interventions    (X) Neurologic checks and seizure monitoring    5/24/2022 3:43 PM EDT by Marce Rizvi      neuro checks q4hrs  seizure precautions    (X) Possible cardiac monitoring    5/24/2022 3:43 PM EDT by Marce Rizvi      continuous telemetry monitoring    Medications    ( ) * Antiepileptic regimen suitable for next level of care in place, if indicated    * Milestone   Additional Notes   Trach collar   Routine icu vitals and monitoring   Continuous telemetry monitoring   Continuous pulse ox monitoring         Wbc 8.2, hgb 7.6, hct 25.5, phos 5.1, mag 2.5, glucose 217, creat 2.6, gfr 19, total protein 6.0, albumin 3.0, alk phos 116         Bp: 168/68, pulse 79, resp 25-34, temp 98.1, o2 sat 100% on trach collar with 30% fio2            Mucomyst nebs 600mg bid   Asa 81mg po daily   Lipitor 20mg po daily   Buspar 5mg po tid   Cymbalta 60mg po daily   Pepcid 20mg iv daily   Heparin 5000 units sub q tid   Apresoline 100mg po tid   Lantus 8 units sub q qhs   Humalog sliding scale 0-3 units sub q qhs   Humalog sliding scale 0-6 units sub q tid   Duonebs 1 ampule q4hrs   Imdur 120mg po daily   Keppra 500mg per g tube bid   Synthroid 150mcg po daily   Lisinopril 20mg po bid   Toprol xl 100mg po daily   Procardia xl 90mg po daily   Requip 4mg po qhs   Renvela 0.8grm po tid      Apresoline 10mg iv q6hrs prn- had 2 doses   Ativan 0.5mg iv q6hrs prn- had 1 dose               Hospitalist Progress Note   [facility]       Subjective:   Patient seen and examined.  Trached.  Trialing off sedation. Assessment and Plan:   Acute left MCA infarct   Per MRI brain 4/29/2022   Neurology following   Neurochecks   Meds per neurology       Seizure   Neurology following   AEDs per neurology   Seizure precautions       MRSA/Raoultella ornithinolytica/Proteus vulgaris respiratory culture positivity   Empiric agents on board per sensitivities       Ventilator dependent acute respiratory failure   Secondary to above processes   Multiple failed SBT   Failed extubation 5/30/2022   S/p trach/PEG 5/10/2022 per Dr. Tavon Gibson collar trials       ESRD on HD   Renal following       Medical noncompliance          C. difficile infection   Completed oral vancomycin course       Tobacco dependence          IDDM 2   Meds on board       DVT prophylaxis   Heparin       Dispo:  LTAC eval in progress      Greyson Salvador MD    5/22/2022 [facility] Neurology Progress Note      INTERVAL HISTORY:  No acute events, remains off sedation, remains more alert, tracking, but still not following commands, unchanged.         ASSESSMENT:   47 y. o. admitted after an episode of unresponsiveness followed by generalized tonic-clonic event in the emergency department. Irene Peng had profoundly elevated glucose, hyponatremia, underlying UTI,  end-stage renal disease with a history of non-compliance noted. MRI with large scattered left MCA distribution stroke.  ECHO negative from a cardioembolic standpoint.  Trach and PEG in place. Exam remains largely unchanged overnight, doing about the same, off sedation. No clinical seizure activity noted overnight, unchanged.         PLAN:   1.  Continue ASA, supportive care. 2.  Continue Keppra   3.  Continue addressing respiratory failure, electrolyte and infectious issues, ESRD nephrology following   4.  Limit sedating medications    5.  Palliative care following      Eliz Stuart DO   Board Certified Neurology                    Nephrology ([facility]) Progress Note      Chief complaint: Altered mental status/end-stage renal disease       Subjective:   Dillan Baez is a 47 y.o. female for whom we were consulted for evaluation and treatment of end-stage renal disease.  Patient also has history of seizure disorder, type 2 diabetes, hypertension, frequent hospitalization with noncompliance and poorly controlled diabetes.  Patient was brought to the emergency room after she was found to have unresponsiveness and witnessed grand mal seizure. Dread Stallings was brought in by ambulance.  On arrival in the emergency room she was found to be severely hyperglycemic and had a urinary tract infection.  Patient was intubated, sedated and evaluated by neurology. Dread Stallings was initially treated with insulin drip for severe hyperglycemia. Kevin Barnes blood sugar control has significantly improved.  On April 25, she has received emergent hemodialysis treatment for correction of volume status as well as hyponatremia. Dread Stallings is now moved from ICU to CCU.  Recent MRI of brain showed Ischemic brain infarct.  Recently patient underwent tracheotomy as she failed to wean off of the ventilator.  She also had PEG placement. Dread Stallings is now waiting to go to long-term acute care [facility]. [facility], she finally weaned off of the ventilator.       This morning she is more awake but not interactive.  She is currently breathing with trach collar      Assessment   1.  End-stage renal disease/on maintenance dialysis. 2.  Type II diabetic nephropathy. 3.  Acute on chronic respiratory failure, trach/trach collar. 4.  Status post grand mal seizure   5.  Severe hyponatremia due to volume overload. 6.  Anemia of chronic kidney disease. 7.  Urinary tract infection. 8.  Pulmonary edema now has resolved. 9.  Hypokalemia   10.  Acute ischemic brain infarct. 11.  Poorly controlled systolic and diastolic hypertension.           Plan:   1.  Routine dialysis Monday Wednesday Friday. 2.  Adjust blood pressure medications. .   3.  Continue LANDON.    4.  Patient is waiting for long-term placement possibly at [address] [place] [address] MD   05/22/22   9:23 AM                 Seizure - Care Day 26 (5/19/2022) by Sil Mustafa RN       Review Status Review Entered   Completed 5/20/2022 11:23      Criteria Review      Care Day: 26 Care Date: 5/19/2022 Level of Care: ICU    Guideline Day 2    Level Of Care    (X) Neurologic unit or floor to discharge    5/20/2022 12:23 PM EDT by Brady Jones      icu    Clinical Status    (X) * Hemodynamic stability    5/20/2022 12:23 PM EDT by Brady Jones      bp: 153/76, pulse 83    ( ) * Mental status at baseline    (X) * No evidence of CNS bleeding or infection    5/20/2022 12:23 PM EDT by Brady Jones      no cns bleeding or infection noted    ( ) * No new neurologic deficits    (X) * Seizures absent or managed    5/20/2022 12:23 PM EDT by Brady Jones      no seizures noted    ( ) * No evidence of seizure etiology requiring inpatient care    ( ) * Discharge plans and education understood    Activity    ( ) * Ambulatory or acceptable for next level of care    Routes    ( ) * Oral hydration    ( ) * Oral medications or regimen acceptable for next level of care    ( ) * Oral diet or acceptable for next level of care    Interventions    (X) Neurologic checks and seizure monitoring    5/20/2022 12:23 PM EDT by Girish Ramsey      seizure precautions  neuro checks q4hrs    (X) Possible cardiac monitoring    5/20/2022 12:23 PM EDT by Girish Ramsey      continuous telemetry monitoring    Medications    ( ) * Antiepileptic regimen suitable for next level of care in place, if indicated    * Milestone   Additional Notes   Routine icu vitals and monitoring   Continuous telemetry monitoring   Continuous pulse ox monitoring   Mechanical ventilation         Wbc 11.1, hgb 8.1, hct 26.7, na 132, glucose 199, creat 2.1, gfr 25, total protein 5.9, albumin 2.8, alk phos 109, phos 3.8, mag 2.4, pt 14.5, inr 1.13   Abg's: ph 7.620, pco2 28.0, po2 127.0, hco3 28.8         Bp: 153/76, pulse 83, resp 14, temp 98.6, o2 sat 96% on vent with 30% fio2         Mucomyst nebs 600mg bid   Asa 81mg po daily   Lipitor 20mg po daily   Cymbalta 60mg po daily   Pepcid 20mg iv daily   Heparin 5000 units sub q tid   Apresoline 100mg po tid   Lantus 8  units sub q qhs   Humalog sliding scale 0-3 units sub q qhs   Humalog sliding scale 0-6 units sub q tid   Duonebs 1 ampule q4hrs   Imdur 120mg po daily   Keppra 500mg po bid   Synthroid 150mcg po daily   Lisinopril 40mg po daily   Toprol xl 100mg po daily   Procardia xl 90mg po daily   Propofol drip   Requip 4mg po qhs   Renvela 0.8grm po tid      Norvasc 10mg po daily-stopped               Hospitalist Progress Note   [facility]       Subjective:   Patient seen and examined.  Trached and sedated         Assessment and Plan:   Acute left MCA infarct   Per MRI brain 4/29/2022   Neurology following   Neurochecks   Meds per neurology       Seizure   Neurology following   AEDs per neurology   Seizure precautions       MRSA/Raoultella ornithinolytica/Proteus vulgaris respiratory culture positivity   Empiric agents on board per sensitivities       Ventilator dependent acute respiratory failure   Secondary to above processes   Multiple failed SBT   Failed extubation 5/30/2022   S/p trach/PEG 5/10/2022 per Dr. Jessica Smallwood       ESRD on HD   Renal following       Medical noncompliance          C. difficile infection   Completed oral vancomycin course       Tobacco dependence          IDDM 2   Meds on board       DVT prophylaxis   Heparin       Dispo:  LTAC eval in progress       Andre Schrader MD    5/19/2022 12:20 PM                  Nephrology ([facility]) Progress Note   Chief complaint: Altered mental status/end-stage renal disease       Subjective:   James Shah is a 47 y.o. female for whom we were consulted for evaluation and treatment of end-stage renal disease.  Patient also has history of seizure disorder, type 2 diabetes, hypertension, frequent hospitalization with noncompliance and poorly controlled diabetes.  Patient was brought to the emergency room after she was found to have unresponsiveness and witnessed grand mal seizure. Mara Hadley was brought in by ambulance.  On arrival in the emergency room she was found to be severely hyperglycemic and had a urinary tract infection.  Patient was intubated, sedated and currently being treated by neurology for grand mal seizure. Mara Hadley is also receiving IV antibiotics and insulin drip. Regenia Range blood sugar control has significantly improved.  On April 25, she has received emergent hemodialysis treatment for correction of volume status as well as hyponatremia. Mara Hadley is now moved from ICU to CCU.  Recent MRI of brain showed Ischemic brain infarct.  Recently patient underwent tracheotomy as she failed to wean off of the ventilator.  She also had PEG placement.  She is now waiting to go to long-term acute care hospital. Mara Hadley has received routine hemodialysis treatment yesterday       This morning patient remained on ventilator via tracheotomy.  She has poor neurological response.  She is waiting to go to [facility]. Robert Benavides was no changes overnight. Assessment   1.  End-stage renal disease/on maintenance dialysis. 2.  Type II diabetic nephropathy. 3.  Acute on chronic respiratory failure, trach and vented. 4.  Status post grand mal seizure   5.  Severe hyponatremia due to volume overload. 6.  Anemia of chronic kidney disease. 7.  Urinary tract infection. 8.  Pulmonary edema now has resolved. 9.  Hypokalemia   10.  Acute ischemic brain infarct. 11.  Poorly controlled systolic and diastolic hypertension.           Plan:   1.  Routine hemodialysis treatment tomorrow. 2.  As needed blood pressure medications needed with routine medicine. .   3.  Continue LANDON. 4.  Plan was discussed with LTAC coordinator.   Zayda Melchor MD   05/19/22   8:58 AM                     [facility] Neurology Progress Note            INTERVAL HISTORY:  No acute events, remains largely unchanged.        REVIEW OF SYSTEMS:   Unable to obtain       NEUROLOGICAL EXAM       Mental status     ?? Awake, alert, oriented    ? ? Affect attention and concentration appear appropriate   ? ? Recent and remote memory appears unremarkable   ? ? Speech normal without dysarthria or aphasia, comprehension and repetition intact. COMMENTS:  Unresponsive to voice, not following commands, does appear to localize to pain at times    Cranial Nerves ? ? No VF deficit to confrontation,  optic discs normal, no papilledema on fundoscopic exam.   ??  PERRLA, EOMI, no nystagmus, conjugate eye movements, no ptosis   ? ? Face symmetric   ? ? Facial sensation intact   ? ? Tongue midline no atrophy or fasciculations present   ? ? Palate midline, hearing to finger rub normal   ?? Shoulder shrug and SCM testing normal   COMMENTS:  PERRL, Face appears sym, cough/gag present, corneals present    Motor   ?? 5/5 strength x 4 extremities   ? ? Normal bulk and tone   ? ? No tremor present   ? ? No rigidity or bradykinesia noted   COMMENTS: Withdrawal x 4, less over RUE   Sensory ? ? Sensation intact to light touch, pin prick, vibration, and proprioception BLE   ?? Sensation intact to light touch, pin prick, vibration, and proprioception BUE   COMMENTS: Limited    Coordination ? ? FTN normal bilaterally    ? ? HTS normal bilaterally   ? ? SANFORD normal.    COMMENTS: Limited    Reflexes ? ? Symmetric and non-pathological   ?? Toes downgoing bilaterally   ? ? No clonus present   COMMENTS:   Gait                  ?? Normal steady gait     ?? Ataxic     ?? Spastic      ?? Magnetic      ?? Shuffling   ? ? Not assessed      ASSESSMENT:   47 y. o. admitted after an episode of unresponsiveness followed by generalized tonic-clonic event in the emergency department. Sixto Blood had profoundly elevated glucose, hyponatremia, underlying UTI,  end-stage renal disease with a history of non-compliance noted. MRI with large scattered left MCA distribution stroke.  ECHO negative from a cardioembolic standpoint.  Trach and PEG now in place. Exam remains largely unchanged overnight, doing about the same, no significant improvement. No clinical seizure activity noted.        PLAN:   1.  Continue ASA, supportive care. 2.  Continue Keppra   3.  Continue addressing respiratory failure, electrolyte and infectious issues, ESRD nephrology following   4.  Limit sedating medications as able, plans to wean propofol and use low dose ativan sparingly for sedation if needed. 5.  Palliative care following       Pb Marin DO   Board Certified Neurology                   Nursing note:      Patient placed back on previous ventilator settings. Became tachypneic and Oxygen saturation was 85-87. Will continue to attempt trach collar trials. Social work/d/c planning note:      Spoke with Moira Lang at [employer] [phone] about insurance review and informed insurance is still denying the pt LTACH services at this time.  SW will discuss with pt's s/o who is HCS and determine if willing to further appeal with pt's insurance at this time.

## 2022-05-27 PROBLEM — Z43.0 ACUTE TRACHEOSTOMY MANAGEMENT (HCC): Status: ACTIVE | Noted: 2022-01-01

## 2022-05-27 PROBLEM — J95.5: Status: ACTIVE | Noted: 2022-01-01

## 2022-05-27 PROBLEM — J96.20 RESPIRATORY FAILURE, ACUTE AND CHRONIC (HCC): Status: ACTIVE | Noted: 2022-01-01

## 2022-05-27 PROBLEM — S11.029A: Status: ACTIVE | Noted: 2022-01-01

## 2022-06-01 LAB — GLUCOSE BLDC GLUCOMTR-MCNC: 386 MG/DL (ref 70–130)

## 2022-06-01 NOTE — DISCHARGE SUMMARY
Marc Tran M.D.  GAEL Mireles      Internal Medicine Death Summary    Patient ID: Ena Upton  MRN: [id number]     Acct:  [id number]       Patient's PCP: Yaron Wiggins APRN    Admit Date: 5/25/2022     Discharge Date: 5/31/2022      Admitting Physician: Partha Tran MD    Discharge Physician: GAEL Childers     Final Diagnoses  Acute hypoxic respiratory failure  ESRD on HD  Dysphagia  Seizure disorder  Sacral decubitus ulcer  HTN  Medical noncompliance  Multifactorial anemia  Poorly controlled DMS with hyperglycemia on long term insulin  Conjunctiviitis    Hospital Problems    Acute tracheostomy management (HCC)    Respiratory failure, acute and chronic (HCC)    Open wound of trachea with complication    Postoperative subglottic stenosis     Past Medical History:   Diagnosis Date    Anxiety     Arthritis     Chronic kidney disease     STAGE V RENAL FAILURE    Depression     Diabetes mellitus (HCC)     HTN (hypertension)     Hypothyroidism     Obesity     Tremors of nervous system          Code Status:    There are no questions and answers to display.       Hospital Course: Ena Upton is a  52 y.o.  female who presents with need for continued oxygen weaning and rehabilitation efforts following recent acute care stay. The patient had been in her usual state of health when she developed increased shortness of breath. She presented to ER 4/24/22 for evaluation and treatment where she was found to have blood glucose over 700. She also suffered a witnessed seizure while in ER. She was intubated for airway protection. She has a history of ESRD on HD with documented medical noncompliance. Further workup revealed evidence of UTI. She was treated with IV antibiotics, V insulin, IV antiepileptic medications and continued HD. She was seen in consultation by nephrology for continued HD management as well as neurology for ongoing workup of new onset  seizures. She was seen in consultation by wound care for treatment of a pressure injury. She was unable to tolerated vent weaning/spontaneous breathing trials. She was eventually weaned off sedation and extubated on 5/3. Unfortunately, she developed wheezing and stridor and required reintubation later that day. Sputum cultures returned positive for MRSA, raoultella ornithinolytica and proteus vulgaris and patient was started on IV vancomycin on 5/8. Due to her inability to liberate from the ventilator, she underwent tracheostomy tube and peg tube placement on 5/10. She has had waxing and waning neurology status and has been off and on sedation throughout a very lengthy hospitalization. She has tolerated trach collar trials briefly. She transferred to our facility for continued oxygen weaning, nutrition support, wound care, HD and rehabilitation efforts.   While at our facility, she continued on routine HD under the direction of nephrology. Initially, she maintained adequate 02 sats with trach collar at 28%. She underwent bedside flexible laryngoscopy per ENT on 5/27 with trach change and tolerated without difficulty. Today, the patient developed symptomatic bradycardia with worsening hypoxia and acidosis. She also had significant leukocytosis with fever.  She was placed back on mechanical ventilation and cultures were obtained. She continued to have issues with bradycardia and eventually suffered other arrhythmias including ventricular fibrillation and PEA. She underwent aggressive ACLS measures with high quality CPR, defibrillation and medication administration. ROSC achieved once, but despite repeat aggressive resuscitative measures, she succumbed to her many acute and chronic illnesses on 5/31 at 1250.     Consults:  Dr. Avitia (nephrology)  Dr. Peterson (ENT)      Time Spent on discharge is  32 minutes in patient examination, evaluation, patient/family counseling as well as medication reconciliation,  prescriptions for required medications, discharge plan and follow up.     Electronically signed by GAEL Childers on 5/31/2022 at 21:25 CDT     I have discussed the care of Ena Upton, including pertinent history and I agree with the summary of care.        Electronically signed by Partha Tran MD on 6/2/2022 at 11:21 CDT

## 2022-06-02 LAB
BACTERIA SPEC AEROBE CULT: ABNORMAL
BACTERIA SPEC AEROBE CULT: ABNORMAL
BACTERIA SPEC RESP CULT: ABNORMAL
BACTERIA SPEC RESP CULT: ABNORMAL
GRAM STN SPEC: ABNORMAL

## 2022-06-23 NOTE — PROGRESS NOTES
Patient Education    BRIGHT Sv[employer]lyS HANDOUT- PATIENT  6 YEAR VISIT  Here are some suggestions from [employer] experts that may be of value to your family.     TAKING CARE OF YOU  If you get angry with someone, try to walk away.  Don t try cigarettes or e-cigarettes. They are bad for you. Walk away if someone offers you one.  Talk with us if you are worried about alcohol or drug use in your family.  Go online only when your parents say it s OK. Don t give your name, address, or phone number on a Web site unless your parents say it s OK.  If you want to chat online, tell your parents first.  If you feel scared online, get off and tell your parents.  Enjoy spending time with your family. Help out at home.    EATING WELL AND BEING ACTIVE  Brush your teeth at least twice each day, morning and night.  Floss your teeth every day.  Wear a mouth guard when playing sports.  Eat breakfast every day.  Be a healthy eater. It helps you do well in school and sports.  Have vegetables, fruits, lean protein, and whole grains at meals and snacks.  Eat when you re hungry. Stop when you feel satisfied.  Eat with your family often.  If you drink fruit juice, drink only 1 cup of 100% fruit juice a day.  Limit high-fat foods and drinks such as candies, snacks, fast food, and soft drinks.  Have healthy snacks such as fruit, cheese, and yogurt.  Drink at least 3 glasses of milk daily.  Turn off the TV, tablet, or computer. Get up and play instead.  Go out and play several times a day.    HANDLING FEELINGS  Talk about your worries. It helps.  Talk about feeling mad or sad with someone who you trust and listens well.  Ask your parent or another trusted adult about changes in your body.  Even questions that feel embarrassing are important. It s OK to talk about your body and how it s changing.    DOING WELL AT SCHOOL  Try to do your best at school. Doing well in school helps you feel good about yourself.  Ask for help when you need  Spoke with Dr Wilfred Emerson. Will start weaning Precedex in expected SBT this AM. Precedex now at 0.6mcg/kg/hr. pt wakes , opens eyes, follow simple command to squeeze my hand with here L hand. R remains flaccid. Also clarified tube feeding as bottle will run out soon. Will stop when this tube feed bottle is empty and not restart unless not extubated. If exutbated , may need Dobhoff or NGT this afternoon.  Electronically signed by Tri Gant RN on 5/3/2022 at 10:47 AM it.  Find clubs and teams to join.  Tell kids who pick on you or try to hurt you to stop. Then walk away.  Tell adults you trust about bullies.    PLAYING IT SAFE  Make sure you re always buckled into your booster seat and ride in the back seat of the car. That is where you are safest.  Wear your helmet and safety gear when riding scooters, biking, skating, in-line skating, skiing, snowboarding, and horseback riding.  Ask your parents about learning to swim. Never swim without an adult nearby.  Always wear sunscreen and a hat when you re outside. Try not to be outside for too long between 11:00 am and 3:00 pm, when it s easy to get a sunburn.  Don t open the door to anyone you don t know.  Have friends over only when your parents say it s OK.  Ask a grown-up for help if you are scared or worried.  It is OK to ask to go home from a friend s house and be with your mom or dad.  Keep your private parts (the parts of your body covered by a bathing suit) covered.  Tell your parent or another grown-up right away if an older child or a grown-up  Shows you his or her private parts.  Asks you to show him or her yours.  Touches your private parts.  Scares you or asks you not to tell your parents.  If that person does any of these things, get away as soon as you can and tell your parent or another adult you trust.  If you see a gun, don t touch it. Tell your parents right away.        Consistent with Bright Futures: Guidelines for Health Supervision of Infants, Children, and Adolescents, 4th Edition  For more information, go to https://brightfutures.aap.org.           Patient Education    BRIGHT FUTURES HANDOUT- PARENT  7 YEAR VISIT  Here are some suggestions from [employer] experts that may be of value to your family.     HOW YOUR FAMILY IS DOING  Encourage your child to be independent and responsible. Hug and praise her.  Spend time with your child. Get to know her friends and their families.  Take pride in your child for  good behavior and doing well in school.  Help your child deal with conflict.  If you are worried about your living or food situation, talk with us. Community agencies and programs such as SNAP can also provide information and assistance.  Don t smoke or use e-cigarettes. Keep your home and car smoke-free. Tobacco-free spaces keep children healthy.  Don t use alcohol or drugs. If you re worried about a family member s use, let us know, or reach out to local or online resources that can help.  Put the family computer in a central place.  Know who your child talks with online.  Install a safety filter.    STAYING HEALTHY  Take your child to the dentist twice a year.  Give a fluoride supplement if the dentist recommends it.  Help your child brush her teeth twice a day  After breakfast  Before bed  Use a pea-sized amount of toothpaste with fluoride.  Help your child floss her teeth once a day.  Encourage your child to always wear a mouth guard to protect her teeth while playing sports.  Encourage healthy eating by  Eating together often as a family  Serving vegetables, fruits, whole grains, lean protein, and low-fat or fat-free dairy  Limiting sugars, salt, and low-nutrient foods  Limit screen time to 2 hours (not counting schoolwork).  Don t put a TV or computer in your child s bedroom.  Consider making a family media use plan. It helps you make rules for media use and balance screen time with other activities, including exercise.  Encourage your child to play actively for at least 1 hour daily.    YOUR GROWING CHILD  Give your child chores to do and expect them to be done.  Be a good role model.  Don t hit or allow others to hit.  Help your child do things for himself.  Teach your child to help others.  Discuss rules and consequences with your child.  Be aware of puberty and changes in your child s body.  Use simple responses to answer your child s questions.  Talk with your child about what worries  him.    SCHOOL  Help your child get ready for school. Use the following strategies:  Create bedtime routines so he gets 10 to 11 hours of sleep.  Offer him a healthy breakfast every morning.  Attend back-to-school night, parent-teacher events, and as many other school events as possible.  Talk with your child and child s teacher about bullies.  Talk with your child s teacher if you think your child might need extra help or tutoring.  Know that your child s teacher can help with evaluations for special help, if your child is not doing well in school.    SAFETY  The back seat is the safest place to ride in a car until your child is 13 years old.  Your child should use a belt-positioning booster seat until the vehicle s lap and shoulder belts fit.  Teach your child to swim and watch her in the water.  Use a hat, sun protection clothing, and sunscreen with SPF of 15 or higher on her exposed skin. Limit time outside when the sun is strongest (11:00 am-3:00 pm).  Provide a properly fitting helmet and safety gear for riding scooters, biking, skating, in-line skating, skiing, snowboarding, and horseback riding.  If it is necessary to keep a gun in your home, store it unloaded and locked with the ammunition locked separately from the gun.  Teach your child plans for emergencies such as a fire. Teach your child how and when to dial 911.  Teach your child how to be safe with other adults.  No adult should ask a child to keep secrets from parents.  No adult should ask to see a child s private parts.  No adult should ask a child for help with the adult s own private parts.        Helpful Resources:  Family Media Use Plan: www.healthychildren.org/MediaUsePlan  Smoking Quit Line: 188.758.8053 Information About Car Safety Seats: www.safercar.gov/parents  Toll-free Auto Safety Hotline: 365.621.4389  Consistent with Bright Futures: Guidelines for Health Supervision of Infants, Children, and Adolescents, 4th Edition  For more  information, go to https://brightfutures.aap.org.

## 2022-08-11 NOTE — ED NOTES
Dr Noa Guillory returned call [phone] [facility]  02/24/21 4748
Pt sent down from Dr Aj Contreras office for a DVT rule out of the left leg. Pt unable to provide any history of when the initial pain started or when it became worse.       [facility] HELENA Stafford  02/24/21 5027
Repaged Dr Diop Holding via office staff at [facility]  02/24/21 [phone]
Repaged Dr Ron Cadena via office staff at [phone] [facility]  02/24/21 6438
CBC/CMP/EKG/COVID-19

## 2023-03-24 NOTE — ED PROVIDER NOTES
[facility]  eMERGENCYdEPARTMENT eNCOUnter      Pt Name: Elliot Montalvo  MRN: [id number]  [place] 1970  Date of evaluation: 6/3/2019  [place] ROMINA Peña CNP    CHIEF COMPLAINT       Chief Complaint   Patient presents with   Huang Fall     pt presents to ED with c/o right arm pain after falling from standing states right rib pain as well. HISTORY OF PRESENT ILLNESS  (Location/Symptom, Timing/Onset, Context/Setting, Quality, Duration, Modifying Factors, Severity.)   Elliot Montalvo is a 47 y.o. female who presents to the emergency department for evaluation of right arm pain following a fall. Patient says she was walking her dog and had the leash wrapped around her wrist. Patient says the dog began running causing her to fall on the right side hitting the right arm. Denies hitting head or loss of consciousness. Has a skin tear to the right forearm. Is unsure when last tetanus vaccine was administered. HPI    Nursing Notes were reviewed and I agree. REVIEW OF SYSTEMS    (2-9 systems for level 4, 10 or more for level 5)     Review of Systems   Constitutional: Negative for activity change, appetite change, chills, diaphoresis, fatigue, fever and unexpected weight change. HENT: Negative for ear pain, sinus pain, sore throat and trouble swallowing. Eyes: Negative for photophobia, pain, discharge, redness and itching. Respiratory: Negative for chest tightness, shortness of breath, wheezing and stridor. Cardiovascular: Negative for chest pain and leg swelling. Gastrointestinal: Negative for abdominal distention, abdominal pain, blood in stool, constipation, diarrhea, nausea and vomiting. Endocrine: Negative. Genitourinary: Negative for difficulty urinating and dysuria. Musculoskeletal: Positive for arthralgias (Right shoulder pain, right wrist and forearm pain). Negative for back pain, joint swelling, neck pain and neck stiffness.         Right-sided rib pain   Skin: Negative for color change, pallor, rash and wound. Allergic/Immunologic: Negative. Neurological: Negative for dizziness and headaches. Hematological: Negative. Psychiatric/Behavioral: Negative. Except as noted above the remainder of the review of systems was reviewed and negative. PAST MEDICAL HISTORY     Past Medical History:   Diagnosis Date    Arthritis     Chronic kidney disease, stage 5, kidney failure [facility]     DM [facility]di[facility] mellitus[facility] type [facility] 06[facility]    Gastroparesis     GERD (gastroesophageal reflux disease)     Hemodialysis patient ([facility]     dialysis on tues, thur, and sat at [facility]    Hypertension     Hypothyroid     Nasal congestion     recent    Noncompliance with medications     Restless leg syndrome          SURGICAL HISTORY       Past Surgical History:   Procedure Laterality Date    BREAST SURGERY      infected milk gland removed    CHOLECYSTECTOMY      COLONOSCOPY      DIALYSIS FISTULA CREATION Left 1/25/2019    REVISION LEFT UPPER EXTREMITY AV ACCESS WITH LEFT BRACHIAL ARTERY TO LEFT AXILLARY VEIN WITH  INTERPOSITIONAL ARTEGRAFT   performed by Nikolas Kay MD at [facility]  2012    GALLBLADDER SURGERY      HC DIALYSIS CATHETER Right 1/25/2019    INSERTION OF RIGHT INTERNAL JUGULAR HEMODIALYSIS CATHETER [facility]      pt denies hyst; states ablation    KS COLONOSCOPY W/BIOPSY SINGLE/MULTIPLE N/A 10/20/2017    Dr Irene Moon prep-Tubular AP (-) dysplasia x 2--3 yr recall    KS EGD TRANSORAL BIOPSY SINGLE/MULTIPLE N/A 12/27/2016    Dr Nina Honeycutt EGD TRANSORAL BIOPSY SINGLE/MULTIPLE N/A 10/20/2017    Dr Gale Welch (-)    TUBAL LIGATION      VASCULAR SURGERY Left 2016    fistula by Dr Sherin Cabezas at [address]  10/02/2017    [facility]. Left upper fistulograms/venograms, balloon angioplasty cephalic vein arch 73Y30 conquest.  VASCULAR SURGERY  08/2018    FISTULOGRAM    VASCULAR SURGERY  10/29/2018    [facility]. Left upper fistulograms/venograms    VASCULAR SURGERY  12/19/2018    [facility]. Arch aortogram,left upper arteriograms.  VASCULAR SURGERY  03/06/2019    [facility]. Removal of tunneled dialyis catheter right internal jugular vein.          CURRENT MEDICATIONS       Discharge Medication List as of 6/3/2019  8:49 PM      CONTINUE these medications which have NOT CHANGED    Details   lisinopril (PRINIVIL;ZESTRIL) 20 MG tablet Take 1 tablet by mouth 2 times daily, Disp-30 tablet, R-0Normal      metoprolol succinate (TOPROL XL) 100 MG extended release tablet Take 1 tablet by mouth every morning (before breakfast), Disp-30 tablet, R-0Normal      cloNIDine (CATAPRES-TTS-3) 0.3 MG/24HR PTWK Place 1 patch onto the skin every 7 days, Disp-4 patch, R-0Normal      hydrALAZINE (APRESOLINE) 50 MG tablet Take 1 tablet by mouth every 8 hours, Disp-90 tablet, R-0Normal      insulin detemir (LEVEMIR) 100 UNIT/ML injection vial Inject 15 Units into the skin nightlyHistorical Med      albuterol sulfate  (90 Base) MCG/ACT inhaler Inhale 2 puffs into the lungs every 4 hours as neededHistorical Med      levothyroxine (SYNTHROID) 150 MCG tablet Take 150 mcg by mouth DailyHistorical Med      NIFEdipine (ADALAT CC) 60 MG extended release tablet Take 60 mg by mouth dailyHistorical Med      isosorbide mononitrate (IMDUR) 120 MG extended release tablet Take 1 tablet by mouth daily, Disp-30 tablet, R-0Normal      ondansetron (ZOFRAN) 4 MG tablet Take 4 mg by mouth every 8 hours as needed for Nausea or VomitingHistorical Med      DULoxetine (CYMBALTA) 60 MG extended release capsule Take 60 mg by mouth dailyHistorical Med      rOPINIRole (REQUIP) 2 MG tablet Take 2 mg by mouth nightly Historical Med      simvastatin (ZOCOR) 40 MG tablet Take 40 mg by mouth nightly Historical Med      insulin aspart (NOVOLOG FLEXPEN) 100 UNIT/ML injection pen Inject 5 Units into the skin 3 times daily Historical Med             ALLERGIES     Penicillins;  Lamisil advanced [terbinafine]; and Stadol [butorphanol]    FAMILY HISTORY       Family History   Problem Relation Age of Onset    Colon Cancer Mother          at 63    Colon Polyps Mother     Lung Cancer Father          at 66    Colon Polyps Father     Stomach Cancer Sister     Breast Cancer Sister     Depression Daughter 25        committed suicide    Liver Cancer Neg Hx     Liver Disease Neg Hx     Esophageal Cancer Neg Hx     Rectal Cancer Neg Hx           SOCIAL HISTORY       Social History     Socioeconomic History    Marital status:      Spouse name: Not on file    Number of children: 2    Years of education: Not on file    Highest education level: Not on file   Occupational History    Not on file   Social Needs    Financial resource strain: Not on file    Food insecurity:     Worry: Not on file     Inability: Not on file    Transportation needs:     Medical: Not on file     Non-medical: Not on file   Tobacco Use    Smoking status: Current Every Day Smoker     Packs/day: 0.50     Years: 25.00     Pack years: 12.50     Types: Cigarettes     Last attempt to quit: 2017     Years since quittin.0    Smokeless tobacco: Never Used   Substance and Sexual Activity    Alcohol use: No    Drug use: No    Sexual activity: Not Currently     Partners: Male   Lifestyle    Physical activity:     Days per week: Not on file     Minutes per session: Not on file    Stress: Not on file   Relationships    Social connections:     Talks on phone: Not on file     Gets together: Not on file     Attends Zoroastrianism service: Not on file     Active member of club or organization: Not on file     Attends meetings of clubs or organizations: Not on file     Relationship status: Not on file    Intimate partner violence:     Fear of current or ex partner: Not on file     Emotionally abused: Not on file     Physically abused: Not on file     Forced sexual activity: Not on file   Other Topics Concern    Not on file   Social History Narrative    Not on file       SCREENINGS           PHYSICAL EXAM    (up to 7 forlevel 4, 8 or more for level 5)     ED Triage Vitals   BP Temp Temp Source Pulse Resp SpO2 Height Weight   06/03/19 1840 06/03/19 1838 06/03/19 1838 06/03/19 1838 06/03/19 1838 06/03/19 1839 06/03/19 1838 06/03/19 1838   (!) 161/77 98.6 °F (37 °C) Oral 68 16 99 % 5' 6\" (1.676 m) 150 lb (68 kg)       Physical Exam   Constitutional: She is oriented to person, place, and time. She appears well-developed and well-nourished. No distress. HENT:   Head: Normocephalic and atraumatic. Nose: Nose normal.   Mouth/Throat: No oropharyngeal exudate. Eyes: Pupils are equal, round, and reactive to light. Conjunctivae and EOM are normal. Right eye exhibits no discharge. Left eye exhibits no discharge. No scleral icterus. Neck: Normal range of motion. Neck supple. No JVD present. No tracheal deviation present. Cardiovascular: Normal rate, regular rhythm, normal heart sounds and intact distal pulses. Exam reveals no gallop and no friction rub. No murmur heard. Pulmonary/Chest: Effort normal and breath sounds normal. No stridor. No respiratory distress. She has no wheezes. She has no rales. She exhibits no tenderness. Abdominal: Soft. Bowel sounds are normal. She exhibits no distension and no mass. There is no tenderness. There is no rebound and no guarding. No hernia. Musculoskeletal: She exhibits no edema or deformity. Right shoulder: She exhibits decreased range of motion, tenderness, bony tenderness and pain. She exhibits no swelling, no effusion, no deformity and no laceration. Left shoulder: Normal.        Right elbow: She exhibits normal range of motion, no swelling, no effusion, no deformity and no laceration. Tenderness found. Left elbow: Normal.        Right wrist: She exhibits tenderness. She exhibits normal range of motion, no bony tenderness, no swelling, no effusion, no crepitus and no deformity. Left wrist: Normal.        Right hip: Normal.        Left hip: Normal.        Right knee: Normal.        Left knee: Normal.        Right ankle: Normal.        Left ankle: Normal.        Cervical back: Normal. She exhibits normal range of motion, no tenderness, no bony tenderness, no swelling, no edema, no deformity, no laceration and no pain. Thoracic back: Normal. She exhibits normal range of motion, no tenderness, no bony tenderness, no swelling, no edema, no deformity, no laceration and no pain. Lumbar back: Normal. She exhibits normal range of motion, no tenderness, no bony tenderness, no swelling, no edema, no deformity, no laceration, no pain and no spasm. Right upper arm: She exhibits tenderness. She exhibits no bony tenderness, no swelling, no edema, no deformity and no laceration. Left upper arm: Normal.        Right forearm: She exhibits tenderness. She exhibits no bony tenderness, no swelling, no edema and no deformity. Left forearm: Normal.        Right hand: Normal. She exhibits normal range of motion, no tenderness and normal capillary refill. Normal sensation noted. Normal strength noted. Left hand: Normal.        Right upper leg: Normal. She exhibits no tenderness, no bony tenderness, no swelling, no edema, no deformity and no laceration. Left upper leg: Normal. She exhibits no tenderness, no bony tenderness, no swelling, no edema, no deformity and no laceration. Right lower leg: Normal. She exhibits no tenderness, no bony tenderness, no swelling, no edema and no deformity. Left lower leg: Normal. She exhibits no tenderness, no bony tenderness, no swelling, no edema, no deformity and no laceration.         Right foot: Normal. There is normal range of motion, no tenderness, no bony tenderness, no swelling, normal capillary refill, no crepitus and no deformity. Left foot: Normal. There is normal range of motion, no tenderness, no bony tenderness, no swelling, normal capillary refill, no crepitus and no deformity. Lymphadenopathy:     She has no cervical adenopathy. Neurological: She is alert and oriented to person, place, and time. She has normal strength. No cranial nerve deficit or sensory deficit. She exhibits normal muscle tone. Coordination normal. GCS eye subscore is 4. GCS verbal subscore is 5. GCS motor subscore is 6. Skin: Skin is dry. Capillary refill takes less than 2 seconds. No rash noted. She is not diaphoretic. No erythema. No pallor. Psychiatric: She has a normal mood and affect. Her behavior is normal.   Nursing note and vitals reviewed. DIAGNOSTIC RESULTS     RADIOLOGY:   Non-plain film images such as CT, Ultrasound and MRI are read by the radiologist. Plain radiographic images are visualized and preliminarilyinterpreted by No att. providers found with the below findings:        Interpretation per the Radiologist below, if available at the time of this note:    XR RIBS RIGHT INCLUDE CHEST (MIN 3 VIEWS)   Final Result   1. No acute cardiopulmonary abnormality. 2. No right-sided rib fractures are detected. Signed by Dr Falguni Bradford on 6/3/2019 8:19 PM      XR HUMERUS RIGHT (MIN 2 VIEWS)   Final Result   Transverse acute mildly displaced right humerus fracture   involving the surgical neck . Signed by Dr Falguni Bradford on 6/3/2019 8:25 PM      XR RADIUS ULNA RIGHT (2 VIEWS)   Final Result   No acute osseous abnormality. Signed by Dr Falguni Bradford on 6/3/2019 8:20 PM      XR WRIST RIGHT (MIN 3 VIEWS)   Final Result   No acute osseous abnormality. Signed by Dr Falguni Bradford on 6/3/2019 8:23 PM      XR SHOULDER RIGHT (MIN 2 VIEWS)   Final Result   Minimally displaced acute fracture involving the surgical   neck and lateral head of the right humerus.    Signed by Dr Falguni Ferreira. Cristadenver on 6/3/2019 8:27 PM          LABS:  Labs Reviewed - No data to display    All other labs were within normal range or notreturned as of this dictation. RE-ASSESSMENT      Patient stable at discharge    [address] [place] [address] and DIFFERENTIAL DIAGNOSIS/MDM:   Vitals:    Vitals:    06/03/19 1838 06/03/19 1839 06/03/19 1840   BP:   (!) 161/77   Pulse: 68  67   Resp: 16 16 20   Temp: 98.6 °F (37 °C)  97.4 °F (36.3 °C)   TempSrc: Oral  Oral   SpO2:  99% 98%   Weight: 150 lb (68 kg)     Height: 5' 6\" (1.676 m)             MDM  Number of Diagnoses or Management Options  Closed fracture of proximal end of right humerus, unspecified fracture morphology, initial encounter:   Diagnosis management comments: Patient presented to the emergency department of right arm pain and right rib pain following a fall after being pulled by a dog on a leash. X-ray showed fracture of the right distal humerus nondisplaced. Otherwise radiographs were negative including a chest x-ray with right rib detail. The patient was given morphine and Zofran and tolerated well. Patient reported improvement of pain after these medications were administered. Dr. Jeanne Howell was consulted on this case. Dr. Jeanne Howell advises to sling the patient and have her follow-up with or so. Patient discharged with pain medication and ortho referral. Patient was stable and ambulatory out of the department at discharge. Patient is well-appearing, stable for discharge and follow-up without fail with his/her medical doctor. I had a detailed discussion with the patient and/or guardian regarding the historical points, exam findings, and any diagnostic results supporting the discharge diagnosis. The patient was educated on care and need for follow-up. Strict return instructions including red flag signs and symptoms were discussed with the patient. Medications for discharge discussed, and adverse effects reviewed. Questions invited and answered.  Patient shows understanding of the discharge information and agrees to follow-up. PROCEDURES:    Procedures      FINAL IMPRESSION      1. Closed fracture of proximal end of right humerus, unspecified fracture morphology, initial encounter          DISPOSITION/PLAN   DISPOSITION Decision To Discharge 06/03/2019 09:01:07 PM      PATIENT REFERRED TO:  MD [address]  [place] [phone]    Schedule an appointment as soon as possible for a visit   Call in the morning for appointment    [facility]  [phone]    As needed, If symptoms worsen      DISCHARGE MEDICATIONS:  Discharge Medication List as of 6/3/2019  8:49 PM      START taking these medications    Details   HYDROcodone-acetaminophen (NORCO) 7.5-325 MG per tablet Take 1 tablet by mouth every 6 hours as needed for Pain for up to 3 days. Intended supply: 3 days.  Take lowest dose possible to manage pain, Disp-12 tablet, R-0Print             (Please note that portions of this note were completed with a voice recognition program.  Efforts were made to edit the dictations but occasionallywords are mis-transcribed.)    ROMINA Arauz CNP, APRN - CNP  06/03/19 8340 Plastic Surgeon Procedure Text (A): After obtaining clear surgical margins the patient was sent to plastics for surgical repair.  The patient understands they will receive post-surgical care and follow-up from the referring physician's office.

## 2023-07-01 NOTE — H&P
consult for evalutation and Txx as indicated: indicated but when stable  DIET CARDIAC;  sodium chloride flush, acetaminophen **OR** acetaminophen, ondansetron **OR** ondansetron, glucose, glucagon (rDNA), albuterol      Critical Care time of >45 minutes  Pt seen/examined and admitted to inpatient status. Inpatient status is used for patients with an expected LOS extending past two midnights due to medical therapy and or critical care needs, otherwise patients are placed to OBServation status. Signed:  Electronically signed by Jordin Viera MD on 6/25/20 at 03:34 AM CDT. show

## 2023-11-28 NOTE — PROGRESS NOTES
"Radha Luke (89 y.o. Female)       Date of Birth   03/14/1934    Social Security Number       Address   [address] [place] [address]    Home Phone   [phone]    MRN   [id number]       [facility]    Marital Status                               Admission Date   11/27/23    Admission Type   Emergency    Admitting Provider   Fredo Currie MD    Attending Provider   Michael Lou MD    Department, Room/Bed   [address], N526/1       Discharge Date       Discharge Disposition       Discharge Destination                                 Attending Provider: Michael Lou MD    Allergies: Levofloxacin, Penicillins    Isolation: None   Infection: None   Code Status: No CPR    Ht: 152.4 cm (60\")   Wt: 47 kg (103 lb 9.9 oz)    Admission Cmt: None   Principal Problem: Acute UTI (urinary tract infection) [N39.0]                   Active Insurance as of 11/27/2023       Primary Coverage       Payor Plan Insurance Group Employer/Plan Group    MEDICARE MEDICARE A & B        Payor Plan Address Payor Plan Phone Number Payor Plan Fax Number Effective Dates    [address] [phone]  3/1/1998 - None Entered    [facility] [id number]         Subscriber Name Subscriber Birth Date Member ID       RADHA LUKE 3/14/1934 [id number]               Secondary Coverage       Payor Plan Insurance Group Employer/Plan Group    AARP  SUP A[employer] HEALTH CARE OPTIONS        Payor Plan Address Payor Plan Phone Number Payor Plan Fax Number Effective Dates    [facility] [phone]  1/1/2015 - None Entered    [address]       [facility] [id number]         Subscriber Name Subscriber Birth Date Member ID       RADHA LUKE 3/14/1934 [id number]                     Emergency Contacts        (Rel.) Home Phone Work Phone Mobile Phone    Yves Luke (Spouse) [phone] -- [phone]    CORTEZ LUKE (Son) -- -- [phone]    TiAlfonzo " Received report from HD. Pt had 3500 taken off, BP elevated. RN took Hydralazine to HD for pt. (Son) [phone] -- --

## 2025-03-17 NOTE — ED NOTES
Bed: 07  Expected date: 10/21/20  Expected time:   Means of arrival: [facility]  Comments:  [facility] altered mental status     Davis Beard, [facility]  10/21/20 5093
Insulin pump removed from pts abdomen and placed in belongings bag.       Booker Zamora RN  10/21/20 4759
Patient placed in a gown Patient placed on cardiac monitor, continuous pulse oximeter, and NIBP monitor.  Monitor alarms on.       Calos Sethi RN  10/21/20 2410
Per Dr. Itzel Kennedy hold Ativan at this time due to seizure activity stopping. Pt placed on Non-re breather. Seizure pads placed.       Savita Maldonado RN  10/21/20 Nikki 70, RN  10/21/20 Nikki 70, RN  10/21/20 8584
Pt having seizure activity. Dr. Emani Chau notified. Verbal order for 1mg Ativan IV.      Loc Herring RN  10/21/20 5409
Report  To aditya Bray RN  10/21/20 3077
Verbal order for Ativan. Dr. Jose Elias Restrepo at bedside.       Cynthia Leigh, RN  10/21/20 9490
impaired balance/cognition

## 2025-04-10 NOTE — PROGRESS NOTES
Patient was not seen//assessed by provider in the flu clinic today. COVID swab was order in compliance with requirement for testing prior to surgery or invasive procedure. Nasopharyngeal swab was collected and ordered through gravity vendor.
I personally spent

## (undated) DEVICE — CONN FLX BREATHE CIRCT

## (undated) DEVICE — SUTURE MCRYL SZ 4-0 L18IN ABSRB UD L19MM PS-2 3/8 CIR PRIM Y496G

## (undated) DEVICE — Z INACTIVE USE 2535480 CLIP LIG M BLU TI HRT SHP WIRE HORZ 180 PER BX

## (undated) DEVICE — 3.0MM PRECISION NEURO (MATCH HEAD)

## (undated) DEVICE — TOWEL,OR,DSP,ST,BLUE,STD,6/PK,12PK/CS: Brand: MEDLINE

## (undated) DEVICE — SYRINGE MED 10ML SLIP TIP BLNT FILL AND LUERLOCK DISP

## (undated) DEVICE — GLV SURG SENSICARE W/ALOE PF LF 7.5 STRL

## (undated) DEVICE — BINDER ABD SM M H8XL30 45IN UNISX STD E 4 PNL DISPOSABLE

## (undated) DEVICE — ADHESIVE SKIN CLSR 0.7ML TOP DERMBND ADV

## (undated) DEVICE — SUTURE VCRL SZ 3-0 L54IN ABSRB VLT LIGAPAK REEL NDL J205G

## (undated) DEVICE — SURGICAL PROCEDURE PACK VASC LOURDES HOSP

## (undated) DEVICE — TOWEL,OR,DSP,ST,BLUE,DLX,4/PK,20PK/CS: Brand: MEDLINE

## (undated) DEVICE — DRSNG SURESITE WNDW 4X4.5

## (undated) DEVICE — CIAGLIA BLUE RHINO PERCUTANEOUS TRACHEOSTOMY INTRODUCER SET: Brand: CIAGLIA BLUE RHINO

## (undated) DEVICE — SUTURE VCRL SZ 3-0 L18IN ABSRB UD W/O NDL POLYGLACTIN 910 J110T

## (undated) DEVICE — SUTURE ETHLN SZ 2-0 L30IN NONABSORBABLE BLK L36MM FSLX 3/8 1674H

## (undated) DEVICE — GLIDEPATH HEMODIALYSIS CATH, ST, DL 14.5 FR. 19CM: Brand: GLIDEPATH LONG-TERM HEMODIALYSIS CATHETER WITH PRELOADED STYLET

## (undated) DEVICE — GOWN,PREVENTION PLUS,XL,ST,24/CS: Brand: MEDLINE

## (undated) DEVICE — KIT PEG 20FR STD PUL EN ACCS DEV ENDOVIVE

## (undated) DEVICE — GLV SURG DERMASSURE GRN LF PF 8.0

## (undated) DEVICE — ENDO KIT,LOURDES HOSPITAL: Brand: MEDLINE INDUSTRIES, INC.

## (undated) DEVICE — SUTURE ABSORBABLE MONOFILAMENT 3-0 SH 27 IN UD PDS + PDP416H

## (undated) DEVICE — C-ARM: Brand: UNBRANDED

## (undated) DEVICE — SUTURE VCRL SZ 3-0 L18IN ABSRB UD L26MM SH 1/2 CIR J864D

## (undated) DEVICE — SPNG GZ WOVN 4X4IN 12PLY 10/BX STRL

## (undated) DEVICE — SOLUTION IV IRRIG POUR BRL 0.9% SODIUM CHL 2F7124

## (undated) DEVICE — TUBE ET 8MM NSL ORAL BASIC CUF INTMED MURPHY EYE RADPQ MRK

## (undated) DEVICE — PK SPINE POST 30

## (undated) DEVICE — MINOR CDS: Brand: MEDLINE INDUSTRIES, INC.

## (undated) DEVICE — GLOVE SURG SZ 7 L12IN FNGR THK94MIL TRNSLUC YEL LTX HYDRGEL

## (undated) DEVICE — 3M™ IOBAN™ 2 ANTIMICROBIAL INCISE DRAPE 6650EZ: Brand: IOBAN™ 2

## (undated) DEVICE — SUTURE PERMAHAND SZ 2-0 L18IN NONABSORBABLE BLK L26MM FS 685G

## (undated) DEVICE — SUTURE VCRL SZ 3-0 L27IN ABSRB UD L26MM SH 1/2 CIR J416H

## (undated) DEVICE — VERESS PNEUMOPERITONEUM NEEDLE: Brand: N.A.

## (undated) DEVICE — GLV SURG BIOGEL LTX PF 6 1/2

## (undated) DEVICE — SYSTEM SKIN CLSR 22CM DERMBND PRINEO

## (undated) DEVICE — SHEET,T,THYROID,STERILE: Brand: MEDLINE

## (undated) DEVICE — DECANTER VI VENT W/ VLV FOR ASEP TRNSF OF FLD

## (undated) DEVICE — Device: Brand: LEVEL 1

## (undated) DEVICE — APPL CHLORAPREP HI/LITE 26ML ORNG

## (undated) DEVICE — ANTIBACTERIAL UNDYED BRAIDED (POLYGLACTIN 910), SYNTHETIC ABSORBABLE SUTURE: Brand: COATED VICRYL

## (undated) DEVICE — MEDI-VAC YANK SUCT HNDL W/TPRD BULBOUS TIP & CONTROL VENT: Brand: CARDINAL HEALTH

## (undated) DEVICE — TUBE TRACH AD SZ 8 L79MM OD12.2MM ID7.6MM CUF PERC W/ DISP

## (undated) DEVICE — SUTURE PROL SZ 6-0 L30IN NONABSORBABLE BLU L9.3MM BV-1 3/8 M8709

## (undated) DEVICE — 4-PORT MANIFOLD: Brand: NEPTUNE 2

## (undated) DEVICE — SPK10277 JACKSON/PRO-AXIS KIT: Brand: SPK10277 JACKSON/PRO-AXIS KIT

## (undated) DEVICE — ROYAL SILK SURGICAL GOWN, XXL: Brand: CONVERTORS

## (undated) DEVICE — 3M™ STERI-DRAPE™ INSTRUMENT POUCH 1018: Brand: STERI-DRAPE™

## (undated) DEVICE — DRAPE,UTILITY,XL,4/PK,STERILE: Brand: MEDLINE

## (undated) DEVICE — SOLUTION IV 1000ML 0.9% SOD CHL PH 5 INJ USP VIAFLX PLAS

## (undated) DEVICE — YANKAUER,TAPERED BULBOUS TIP,W/O VENT: Brand: MEDLINE

## (undated) DEVICE — SUTURE PROL SZ 6-0 L24IN NONABSORBABLE BLU L9.3MM BV-1 3/8 8805H

## (undated) DEVICE — DECANTER FLD 9IN ST BG FOR ASEP TRNSF OF FLD

## (undated) DEVICE — GLV SURG GRN DERMASSURE LF PF 7.5

## (undated) DEVICE — PACK,UNIVERSAL,NO GOWNS: Brand: MEDLINE

## (undated) DEVICE — GLOVE SURG SZ 85 L12IN FNGR ORTHO 126MIL CRM LTX FREE

## (undated) DEVICE — SUTURE PROL SZ 0 L30IN NONABSORBABLE BLU L26MM CT-2 1/2 CIR 8412H

## (undated) DEVICE — SUTURE VCRL SZ 2-0 L36IN ABSRB UD L36MM CT-1 1/2 CIR J945H

## (undated) DEVICE — COVER US PRB W15XL120CM W/ GEL RUBBERBAND TAPE STRP FLD GEN

## (undated) DEVICE — SOLUTION IV 250ML 0.9% SOD CHL PH 5 INJ USP VIAFLX PLAS

## (undated) DEVICE — GLOVE SURG SZ 7 CRM LTX FREE POLYISOPRENE POLYMER BEAD ANTI

## (undated) DEVICE — FORCEPS BX L240CM DIA2.4MM L NDL RAD JAW 4 133340

## (undated) DEVICE — DRP SURG UTIL W/TPE 2/LAYR 15X26IN DISP

## (undated) DEVICE — DRESSING WND W35XL35IN TRACH ADH BACTSTAT PTCH W SLT POLYMEM

## (undated) DEVICE — GLOVE SURG SZ 75 CRM LTX FREE POLYISOPRENE POLYMER BEAD ANTI

## (undated) DEVICE — PK TURNOVER RM ADV

## (undated) DEVICE — SPONGE LAP W18XL18IN WHT COT 4 PLY FLD STRUNG RADPQ DISP ST

## (undated) DEVICE — PAD,ABDOMINAL,8"X10",ST,LF: Brand: MEDLINE

## (undated) DEVICE — YANKAUER SUCTION INSTRUMENT WITHOUT CONTROL VENT, OPEN TIP, CLEAR: Brand: YANKAUER

## (undated) DEVICE — SUTURE PROL SZ 5-0 L24IN NONABSORBABLE BLU L13MM C-1 3/8 M8725

## (undated) DEVICE — BANDAGE COMPR W4INXL15FT BGE E SGL LAYERED CLP CLSR

## (undated) DEVICE — SUTURE VCRL SZ 4-0 L18IN ABSRB UD VCRL POLYGLACTIN 910 COAT J109T

## (undated) DEVICE — CLIP INT SM WIDE RED TI TRNSVRS GRV CHEVRON SHP W/ PRECIS

## (undated) DEVICE — GLV SURG BIOGEL LTX PF 7 1/2

## (undated) DEVICE — ELECTRD BLD EZ CLN MOD XLNG 2.75IN

## (undated) DEVICE — CATH IV ANGIO FEP 12G 3IN LTBLU 10PK

## (undated) DEVICE — SNARE POLYP SM W13MMXL240CM SHTH DIA2.4MM OVL FLX DISP

## (undated) DEVICE — GOWN,PREVENTION PLUS,2XL,ST,22/CS: Brand: MEDLINE